# Patient Record
Sex: FEMALE | Race: WHITE | Employment: OTHER | URBAN - METROPOLITAN AREA
[De-identification: names, ages, dates, MRNs, and addresses within clinical notes are randomized per-mention and may not be internally consistent; named-entity substitution may affect disease eponyms.]

---

## 2018-11-21 RX ORDER — BUMETANIDE 1 MG/1
TABLET ORAL
Qty: 60 TABLET | Refills: 0 | OUTPATIENT
Start: 2018-11-21

## 2020-05-21 ENCOUNTER — HOSPITAL ENCOUNTER (OUTPATIENT)
Age: 60
Setting detail: SPECIMEN
Discharge: HOME | End: 2020-05-21
Payer: COMMERCIAL

## 2020-05-21 LAB
ANION GAP SERPL CALCULATED.3IONS-SCNC: 17 MMOL/L (ref 9–17)
BUN BLDV-MCNC: 58 MG/DL (ref 8–23)
BUN/CREAT BLD: ABNORMAL (ref 9–20)
CALCIUM SERPL-MCNC: 9.1 MG/DL (ref 8.6–10.4)
CHLORIDE BLD-SCNC: 100 MMOL/L (ref 98–107)
CO2: 22 MMOL/L (ref 20–31)
CREAT SERPL-MCNC: 2.86 MG/DL (ref 0.5–0.9)
GFR AFRICAN AMERICAN: 20 ML/MIN
GFR NON-AFRICAN AMERICAN: 17 ML/MIN
GFR SERPL CREATININE-BSD FRML MDRD: ABNORMAL ML/MIN/{1.73_M2}
GFR SERPL CREATININE-BSD FRML MDRD: ABNORMAL ML/MIN/{1.73_M2}
GLUCOSE BLD-MCNC: 205 MG/DL (ref 70–99)
HCT VFR BLD CALC: 28.7 % (ref 36.3–47.1)
HEMOGLOBIN: 9.4 G/DL (ref 11.9–15.1)
MCH RBC QN AUTO: 31.2 PG (ref 25.2–33.5)
MCHC RBC AUTO-ENTMCNC: 32.8 G/DL (ref 28.4–34.8)
MCV RBC AUTO: 95.3 FL (ref 82.6–102.9)
NRBC AUTOMATED: 0 PER 100 WBC
PDW BLD-RTO: 15.3 % (ref 11.8–14.4)
PLATELET # BLD: 192 K/UL (ref 138–453)
PMV BLD AUTO: 10.9 FL (ref 8.1–13.5)
POTASSIUM SERPL-SCNC: 4.3 MMOL/L (ref 3.7–5.3)
RBC # BLD: 3.01 M/UL (ref 3.95–5.11)
SODIUM BLD-SCNC: 139 MMOL/L (ref 135–144)
WBC # BLD: 6.8 K/UL (ref 3.5–11.3)

## 2020-05-21 PROCEDURE — 36415 COLL VENOUS BLD VENIPUNCTURE: CPT

## 2020-05-21 PROCEDURE — 80048 BASIC METABOLIC PNL TOTAL CA: CPT

## 2020-05-21 PROCEDURE — 85027 COMPLETE CBC AUTOMATED: CPT

## 2020-05-21 PROCEDURE — P9603 ONE-WAY ALLOW PRORATED MILES: HCPCS

## 2020-05-26 ENCOUNTER — HOSPITAL ENCOUNTER (OUTPATIENT)
Age: 60
Setting detail: SPECIMEN
Discharge: HOME | End: 2020-05-26
Payer: COMMERCIAL

## 2020-05-26 LAB
ANION GAP SERPL CALCULATED.3IONS-SCNC: 15 MMOL/L (ref 9–17)
BUN BLDV-MCNC: 42 MG/DL (ref 8–23)
BUN/CREAT BLD: ABNORMAL (ref 9–20)
CALCIUM SERPL-MCNC: 9 MG/DL (ref 8.6–10.4)
CHLORIDE BLD-SCNC: 107 MMOL/L (ref 98–107)
CO2: 21 MMOL/L (ref 20–31)
CREAT SERPL-MCNC: 2.46 MG/DL (ref 0.5–0.9)
GFR AFRICAN AMERICAN: 24 ML/MIN
GFR NON-AFRICAN AMERICAN: 20 ML/MIN
GFR SERPL CREATININE-BSD FRML MDRD: ABNORMAL ML/MIN/{1.73_M2}
GFR SERPL CREATININE-BSD FRML MDRD: ABNORMAL ML/MIN/{1.73_M2}
GLUCOSE BLD-MCNC: 85 MG/DL (ref 70–99)
POTASSIUM SERPL-SCNC: 4.1 MMOL/L (ref 3.7–5.3)
SODIUM BLD-SCNC: 143 MMOL/L (ref 135–144)

## 2020-05-26 PROCEDURE — P9603 ONE-WAY ALLOW PRORATED MILES: HCPCS

## 2020-05-26 PROCEDURE — 80048 BASIC METABOLIC PNL TOTAL CA: CPT

## 2020-05-26 PROCEDURE — 36415 COLL VENOUS BLD VENIPUNCTURE: CPT

## 2021-03-15 ENCOUNTER — HOSPITAL ENCOUNTER (OUTPATIENT)
Age: 61
Setting detail: SPECIMEN
Discharge: HOME | End: 2021-03-15
Payer: COMMERCIAL

## 2021-03-15 LAB
ANION GAP SERPL CALCULATED.3IONS-SCNC: 12 MMOL/L (ref 9–17)
BUN BLDV-MCNC: 26 MG/DL (ref 8–23)
BUN/CREAT BLD: ABNORMAL (ref 9–20)
CALCIUM SERPL-MCNC: 8.4 MG/DL (ref 8.6–10.4)
CHLORIDE BLD-SCNC: 105 MMOL/L (ref 98–107)
CO2: 21 MMOL/L (ref 20–31)
CREAT SERPL-MCNC: 1.9 MG/DL (ref 0.5–0.9)
ESTIMATED AVERAGE GLUCOSE: 209 MG/DL
GFR AFRICAN AMERICAN: 32 ML/MIN
GFR NON-AFRICAN AMERICAN: 27 ML/MIN
GFR SERPL CREATININE-BSD FRML MDRD: ABNORMAL ML/MIN/{1.73_M2}
GFR SERPL CREATININE-BSD FRML MDRD: ABNORMAL ML/MIN/{1.73_M2}
GLUCOSE BLD-MCNC: 323 MG/DL (ref 70–99)
HBA1C MFR BLD: 8.9 % (ref 4–6)
HCT VFR BLD CALC: 30 % (ref 36.3–47.1)
HEMOGLOBIN: 9.4 G/DL (ref 11.9–15.1)
MCH RBC QN AUTO: 29.3 PG (ref 25.2–33.5)
MCHC RBC AUTO-ENTMCNC: 31.3 G/DL (ref 28.4–34.8)
MCV RBC AUTO: 93.5 FL (ref 82.6–102.9)
NRBC AUTOMATED: 0 PER 100 WBC
PDW BLD-RTO: 15.3 % (ref 11.8–14.4)
PLATELET # BLD: 210 K/UL (ref 138–453)
PMV BLD AUTO: 11 FL (ref 8.1–13.5)
POTASSIUM SERPL-SCNC: 4 MMOL/L (ref 3.7–5.3)
RBC # BLD: 3.21 M/UL (ref 3.95–5.11)
SODIUM BLD-SCNC: 138 MMOL/L (ref 135–144)
WBC # BLD: 9.6 K/UL (ref 3.5–11.3)

## 2021-03-15 PROCEDURE — 83036 HEMOGLOBIN GLYCOSYLATED A1C: CPT

## 2021-03-15 PROCEDURE — 80048 BASIC METABOLIC PNL TOTAL CA: CPT

## 2021-03-15 PROCEDURE — 36415 COLL VENOUS BLD VENIPUNCTURE: CPT

## 2021-03-15 PROCEDURE — P9603 ONE-WAY ALLOW PRORATED MILES: HCPCS

## 2021-03-15 PROCEDURE — 85027 COMPLETE CBC AUTOMATED: CPT

## 2021-03-25 ENCOUNTER — APPOINTMENT (OUTPATIENT)
Dept: GENERAL RADIOLOGY | Age: 61
End: 2021-03-25
Payer: COMMERCIAL

## 2021-03-25 ENCOUNTER — HOSPITAL ENCOUNTER (EMERGENCY)
Age: 61
Discharge: HOME | End: 2021-03-25
Attending: EMERGENCY MEDICINE
Payer: COMMERCIAL

## 2021-03-25 VITALS
RESPIRATION RATE: 14 BRPM | TEMPERATURE: 98.9 F | DIASTOLIC BLOOD PRESSURE: 70 MMHG | BODY MASS INDEX: 38.49 KG/M2 | HEART RATE: 67 BPM | WEIGHT: 231 LBS | SYSTOLIC BLOOD PRESSURE: 185 MMHG | OXYGEN SATURATION: 100 % | HEIGHT: 65 IN

## 2021-03-25 DIAGNOSIS — J18.9 COMMUNITY ACQUIRED PNEUMONIA OF LEFT LOWER LOBE OF LUNG: Primary | ICD-10-CM

## 2021-03-25 LAB
ABSOLUTE EOS #: 0.2 K/UL (ref 0–0.4)
ABSOLUTE IMMATURE GRANULOCYTE: ABNORMAL K/UL (ref 0–0.3)
ABSOLUTE LYMPH #: 1.4 K/UL (ref 1–4.8)
ABSOLUTE MONO #: 0.4 K/UL (ref 0.1–1.3)
ALBUMIN SERPL-MCNC: 3.1 G/DL (ref 3.5–5.2)
ALBUMIN/GLOBULIN RATIO: ABNORMAL (ref 1–2.5)
ALP BLD-CCNC: 120 U/L (ref 35–104)
ALT SERPL-CCNC: 7 U/L (ref 5–33)
ANION GAP SERPL CALCULATED.3IONS-SCNC: 11 MMOL/L (ref 9–17)
AST SERPL-CCNC: 10 U/L
BASOPHILS # BLD: 1 % (ref 0–2)
BASOPHILS ABSOLUTE: 0.1 K/UL (ref 0–0.2)
BILIRUB SERPL-MCNC: 0.41 MG/DL (ref 0.3–1.2)
BNP INTERPRETATION: ABNORMAL
BUN BLDV-MCNC: 24 MG/DL (ref 8–23)
BUN/CREAT BLD: ABNORMAL (ref 9–20)
CALCIUM SERPL-MCNC: 8.9 MG/DL (ref 8.6–10.4)
CHLORIDE BLD-SCNC: 109 MMOL/L (ref 98–107)
CO2: 21 MMOL/L (ref 20–31)
CREAT SERPL-MCNC: 1.69 MG/DL (ref 0.5–0.9)
D-DIMER QUANTITATIVE: 0.64 MG/L FEU (ref 0–0.59)
DIFFERENTIAL TYPE: ABNORMAL
EOSINOPHILS RELATIVE PERCENT: 3 % (ref 0–4)
GFR AFRICAN AMERICAN: 37 ML/MIN
GFR NON-AFRICAN AMERICAN: 31 ML/MIN
GFR SERPL CREATININE-BSD FRML MDRD: ABNORMAL ML/MIN/{1.73_M2}
GFR SERPL CREATININE-BSD FRML MDRD: ABNORMAL ML/MIN/{1.73_M2}
GLUCOSE BLD-MCNC: 262 MG/DL (ref 70–99)
HCT VFR BLD CALC: 30 % (ref 36–46)
HEMOGLOBIN: 10.1 G/DL (ref 12–16)
IMMATURE GRANULOCYTES: ABNORMAL %
INR BLD: 1.2
LYMPHOCYTES # BLD: 21 % (ref 24–44)
MAGNESIUM: 1.7 MG/DL (ref 1.6–2.6)
MCH RBC QN AUTO: 29.8 PG (ref 26–34)
MCHC RBC AUTO-ENTMCNC: 33.6 G/DL (ref 31–37)
MCV RBC AUTO: 88.9 FL (ref 80–100)
MONOCYTES # BLD: 6 % (ref 1–7)
NRBC AUTOMATED: ABNORMAL PER 100 WBC
PARTIAL THROMBOPLASTIN TIME: 26.5 SEC (ref 24–36)
PDW BLD-RTO: 15.7 % (ref 11.5–14.9)
PLATELET # BLD: 262 K/UL (ref 150–450)
PLATELET ESTIMATE: ABNORMAL
PMV BLD AUTO: 7.9 FL (ref 6–12)
POTASSIUM SERPL-SCNC: 3.8 MMOL/L (ref 3.7–5.3)
PRO-BNP: 1202 PG/ML
PROTHROMBIN TIME: 15.1 SEC (ref 11.8–14.6)
RBC # BLD: 3.38 M/UL (ref 4–5.2)
RBC # BLD: ABNORMAL 10*6/UL
SEG NEUTROPHILS: 69 % (ref 36–66)
SEGMENTED NEUTROPHILS ABSOLUTE COUNT: 4.7 K/UL (ref 1.3–9.1)
SODIUM BLD-SCNC: 141 MMOL/L (ref 135–144)
TOTAL PROTEIN: 6.8 G/DL (ref 6.4–8.3)
TROPONIN INTERP: ABNORMAL
TROPONIN INTERP: ABNORMAL
TROPONIN T: ABNORMAL NG/ML
TROPONIN T: ABNORMAL NG/ML
TROPONIN, HIGH SENSITIVITY: 52 NG/L (ref 0–14)
TROPONIN, HIGH SENSITIVITY: 54 NG/L (ref 0–14)
WBC # BLD: 6.8 K/UL (ref 3.5–11)
WBC # BLD: ABNORMAL 10*3/UL

## 2021-03-25 PROCEDURE — 36415 COLL VENOUS BLD VENIPUNCTURE: CPT

## 2021-03-25 PROCEDURE — 83880 ASSAY OF NATRIURETIC PEPTIDE: CPT

## 2021-03-25 PROCEDURE — 80053 COMPREHEN METABOLIC PANEL: CPT

## 2021-03-25 PROCEDURE — 6370000000 HC RX 637 (ALT 250 FOR IP): Performed by: STUDENT IN AN ORGANIZED HEALTH CARE EDUCATION/TRAINING PROGRAM

## 2021-03-25 PROCEDURE — 84484 ASSAY OF TROPONIN QUANT: CPT

## 2021-03-25 PROCEDURE — 93005 ELECTROCARDIOGRAM TRACING: CPT | Performed by: STUDENT IN AN ORGANIZED HEALTH CARE EDUCATION/TRAINING PROGRAM

## 2021-03-25 PROCEDURE — 85025 COMPLETE CBC W/AUTO DIFF WBC: CPT

## 2021-03-25 PROCEDURE — 83735 ASSAY OF MAGNESIUM: CPT

## 2021-03-25 PROCEDURE — 85730 THROMBOPLASTIN TIME PARTIAL: CPT

## 2021-03-25 PROCEDURE — 71045 X-RAY EXAM CHEST 1 VIEW: CPT

## 2021-03-25 PROCEDURE — 6360000002 HC RX W HCPCS: Performed by: STUDENT IN AN ORGANIZED HEALTH CARE EDUCATION/TRAINING PROGRAM

## 2021-03-25 PROCEDURE — 85610 PROTHROMBIN TIME: CPT

## 2021-03-25 PROCEDURE — 99285 EMERGENCY DEPT VISIT HI MDM: CPT

## 2021-03-25 PROCEDURE — 85379 FIBRIN DEGRADATION QUANT: CPT

## 2021-03-25 PROCEDURE — 96374 THER/PROPH/DIAG INJ IV PUSH: CPT

## 2021-03-25 RX ORDER — DOXYCYCLINE 100 MG/1
100 CAPSULE ORAL ONCE
Status: COMPLETED | OUTPATIENT
Start: 2021-03-25 | End: 2021-03-25

## 2021-03-25 RX ORDER — ASPIRIN 81 MG/1
324 TABLET, CHEWABLE ORAL ONCE
Status: COMPLETED | OUTPATIENT
Start: 2021-03-25 | End: 2021-03-25

## 2021-03-25 RX ORDER — DOXYCYCLINE HYCLATE 100 MG
100 TABLET ORAL 2 TIMES DAILY
Qty: 20 TABLET | Refills: 0 | Status: SHIPPED | OUTPATIENT
Start: 2021-03-25 | End: 2021-04-04

## 2021-03-25 RX ORDER — NITROGLYCERIN 0.4 MG/1
0.4 TABLET SUBLINGUAL ONCE
Status: COMPLETED | OUTPATIENT
Start: 2021-03-25 | End: 2021-03-25

## 2021-03-25 RX ORDER — ONDANSETRON 2 MG/ML
4 INJECTION INTRAMUSCULAR; INTRAVENOUS ONCE
Status: COMPLETED | OUTPATIENT
Start: 2021-03-25 | End: 2021-03-25

## 2021-03-25 RX ADMIN — APIXABAN 5 MG: 5 TABLET, FILM COATED ORAL at 16:53

## 2021-03-25 RX ADMIN — ONDANSETRON 4 MG: 2 INJECTION INTRAMUSCULAR; INTRAVENOUS at 15:27

## 2021-03-25 RX ADMIN — ASPIRIN 324 MG: 81 TABLET, CHEWABLE ORAL at 15:27

## 2021-03-25 RX ADMIN — DOXYCYCLINE 100 MG: 100 CAPSULE ORAL at 16:56

## 2021-03-25 RX ADMIN — NITROGLYCERIN 0.4 MG: 0.4 TABLET SUBLINGUAL at 15:27

## 2021-03-25 ASSESSMENT — PAIN DESCRIPTION - FREQUENCY: FREQUENCY: CONTINUOUS

## 2021-03-25 ASSESSMENT — ENCOUNTER SYMPTOMS
BACK PAIN: 1
SORE THROAT: 0
VOMITING: 0
COUGH: 0
SHORTNESS OF BREATH: 1
ABDOMINAL PAIN: 0
NAUSEA: 1
RHINORRHEA: 0
PHOTOPHOBIA: 0

## 2021-03-25 NOTE — ED PROVIDER NOTES
[address]  Emergency Department Encounter  EmergencyMedicine Resident     Pt Name:Estefany Smith  MRN: [id number]  [place] 1960  Date of evaluation: 3/25/21  PCP:  AFSANEH Hancock CNP    CHIEF COMPLAINT       Chief Complaint   Patient presents with    Chest Pain       HISTORY OF PRESENT ILLNESS  (Location/Symptom, Timing/Onset, Context/Setting, Quality, Duration, Modifying Factors, Severity.)      Joey Wilkes is a 59 y.o. female who presents with chest pain and that it radiates through to the thoracic back. Between her is globular. Patient is a similar to prior episodes of MI patient had an episode like this this began last evening and went away but returned this morning worsened in intensity associated with nausea no vomiting associated also with shortness of breath. Patient is not on home oxygen currently on 2 to 3 L nasal cannula. She has been off of her Eliquis since March 18 due to insurance issues she has a history of DVT in each extremity. Last cath 10/21/19    · 1. Multivessel coronary artery disease with 3/3 bypass grafts patent. · 2. Severe disease in the obtuse marginal branch distal to the   anastomosis of the saphenous vein bypass graft is not amenable to   percutaneous revascularization. · 3. Left ventriculography not performed due to elevated creatinine. PAST MEDICAL / SURGICAL / SOCIAL / FAMILY HISTORY      has a past medical history of Backache, unspecified, CHF (congestive heart failure) ([address] Coronary atherosclerosis of artery bypass graft, Cramp of limb, Gallstones, Hypertension, Insomnia, Pneumonia, Type II or unspecified type diabetes mellitus with renal manifestations, not stated as uncontrolled ([address] Type II or unspecified type diabetes mellitus without mention of complication, not stated as uncontrolled ([address] and Unspecified vitamin D deficiency.        has a past surgical history that includes Coronary artery bypass graft; Knee arthroscopy; Carpal tunnel release; Breast surgery; Tonsillectomy; Hand surgery; and Ankle fracture surgery. Social History     Socioeconomic History    Marital status: Single     Spouse name: Not on file    Number of children: Not on file    Years of education: Not on file    Highest education level: Not on file   Occupational History    Not on file   Social Needs    Financial resource strain: Not on file    Food insecurity     Worry: Not on file     Inability: Not on file    Transportation needs     Medical: Not on file     Non-medical: Not on file   Tobacco Use    Smoking status: Never Smoker    Smokeless tobacco: Never Used   Substance and Sexual Activity    Alcohol use: No    Drug use: No    Sexual activity: Not on file   Lifestyle    Physical activity     Days per week: Not on file     Minutes per session: Not on file    Stress: Not on file   Relationships    Social connections     Talks on phone: Not on file     Gets together: Not on file     Attends Worship service: Not on file     Active member of club or organization: Not on file     Attends meetings of clubs or organizations: Not on file     Relationship status: Not on file    Intimate partner violence     Fear of current or ex partner: Not on file     Emotionally abused: Not on file     Physically abused: Not on file     Forced sexual activity: Not on file   Other Topics Concern    Not on file   Social History Narrative    Not on file       Family History   Problem Relation Age of Onset    Diabetes Father     Heart Failure Father        Allergies:  Ace inhibitors, Codeine, Metformin and related, and Penicillins    Home Medications:  Prior to Admission medications    Medication Sig Start Date End Date Taking?  Authorizing Provider   warfarin (COUMADIN) 5 MG tablet TAKE 1 TABLET BY MOUTH ONCE A DAY 9/22/16   Pramod Lane MD   amLODIPine (NORVASC) 5 MG tablet TAKE 1 TABLET BY MOUTH EVERY DAY 7/11/16   Ngozi Goyal MD   omeprazole (PRILOSEC) 40 MG capsule TAKE 1 CAPSULE DAILY AS NEEDED 7/8/16   Enoc Ford MD   isosorbide mononitrate (IMDUR) 30 MG CR tablet TAKE 1 TABLET BY MOUTH EVERY DAY 6/1/16   Enoc Ford MD   LEVEMIR FLEXTOUCH 100 UNIT/ML injection pen INJECT 60 UNITS TWICE DAILY 5/20/16   Enoc Ford MD   meclizine (ANTIVERT) 25 MG tablet TAKE 1 TABLET BY MOUTH THREE TIMES A DAY FOR 30 DAYS 4/14/16   Enoc Ford MD   oxyCODONE-acetaminophen (PERCOCET) 5-325 MG per tablet Take 1 tablet by mouth every 6 hours as needed for Pain 1/8/16   Frances Muller MD   Suvorexant (BELSOMRA) 10 MG TABS Take 10 mg by mouth daily 12/22/15   Enoc Ford MD   losartan (COZAAR) 25 MG tablet TAKE 1 TABLET BY MOUTH EVERY DAY 12/18/15   Star Frias MD   VICTOZA 18 MG/3ML SOPN SC injection INJECT 1.8MG INTO THE SKIN EVERY DAY 11/4/15   James Mayfield, APRN - CNP   furosemide (LASIX) 40 MG tablet TAKE 1 TABLET EVERY DAY 11/3/15   Star Frias MD   metFORMIN (GLUCOPHAGE) 500 MG tablet TAKE 1 TABLET 2 TIMES A DAY 11/3/15   Star Frias MD   furosemide (LASIX) 40 MG tablet TAKE 1 TABLET EVERY DAY 11/3/15   Star Frias MD   carvedilol (COREG) 12.5 MG tablet TAKE 1 TABLET TWICE A DAY 8/31/15   Enoc Ford MD   warfarin (COUMADIN) 1 MG tablet Take one tablet daily with 5mg tablets to make 6mg daily 8/28/15   Enoc Ford MD   atorvastatin (LIPITOR) 40 MG tablet  8/8/15   Historical Provider, MD   losartan (COZAAR) 50 MG tablet Take 50 mg by mouth daily    Historical Provider, MD   potassium chloride SA (K-DUR;KLOR-CON M) 20 MEQ tablet TAKE 1 TABLET EVERY DAY 6/1/15   Enoc Ford MD   EFFIENT 10 MG TABS  4/20/15   Historical Provider, MD   nitroGLYCERIN (NITROSTAT) 0.4 MG SL tablet Place 1 tablet under the tongue every 5 minutes as needed for Chest pain.  3/29/15   James Fees, APRN - CNP   Insulin Pen Needle (BD ULTRA-FINE PEN NEEDLES) 29G X 12.7MM MISC 1 each by Does not apply route 6 times daily. For use with each insulin 12/23/14   Eli Mcknight MD   docusate sodium (COLACE) 100 MG capsule Take 100 mg by mouth 2 times daily. Historical Provider, MD   insulin detemir (LEVEMIR) 100 UNIT/ML injection vial Inject 60 Units into the skin 2 times daily. Historical Provider, MD   insulin glulisine (APIDRA SOLOSTAR) 100 UNIT/ML injection pen Inject 15 Units into the skin 3 times daily (before meals). Inject  into the skin 3 times daily (before meals). 10/30/14   Eli Mcknight MD   glucose blood VI test strips (DEN CONTOUR TEST) strip 1 each by In Vitro route 3 times daily. As needed. 10/30/14   Eli Mcknight MD   Lancets 28G MISC Inject 1 each into the skin 3 times daily. 10/30/14   Eli Mcknight MD   vitamin D (ERGOCALCIFEROL) 400 UNITS CAPS Take 400 Units by mouth daily. Historical Provider, MD   aspirin 81 MG tablet Take 81 mg by mouth daily. Historical Provider, MD       REVIEW OF SYSTEMS    (2-9 systems for level 4, 10 or more for level 5)      Review of Systems   Constitutional: Negative for fever. HENT: Negative for congestion, rhinorrhea and sore throat. Eyes: Negative for photophobia. Respiratory: Positive for shortness of breath. Negative for cough. Cardiovascular: Positive for chest pain. Negative for palpitations and leg swelling. Gastrointestinal: Positive for nausea. Negative for abdominal pain and vomiting. Genitourinary: Negative for flank pain. Musculoskeletal: Positive for back pain. Skin: Negative for pallor. Allergic/Immunologic: Negative for environmental allergies and food allergies. Neurological: Negative for headaches. Psychiatric/Behavioral: Negative for confusion.        PHYSICAL EXAM   (up to 7 for level 4, 8 or more for level 5)      INITIAL VITALS:   BP (!) 189/89   Pulse 76   Temp 98.9 °F (37.2 °C) (Oral)   Resp 15   Ht 5' 5\" (1.651 m)   Wt 231 lb (104.8 kg)   SpO2 96%   BMI 38.44 kg/m²     Physical Exam  Vitals signs and nursing note reviewed. Constitutional:       Appearance: She is ill-appearing. She is not toxic-appearing. HENT:      Head: Normocephalic and atraumatic. Right Ear: External ear normal.      Left Ear: External ear normal.      Nose: Nose normal.      Mouth/Throat:      Mouth: Mucous membranes are moist.   Eyes:      Conjunctiva/sclera: Conjunctivae normal.   Neck:      Musculoskeletal: Normal range of motion. Cardiovascular:      Rate and Rhythm: Normal rate. Pulses: Normal pulses. Pulmonary:      Effort: Pulmonary effort is normal.   Abdominal:      Palpations: Abdomen is soft. Tenderness: There is no abdominal tenderness. There is no right CVA tenderness, left CVA tenderness or guarding. Musculoskeletal: Normal range of motion. General: Tenderness present. No deformity. Right lower leg: No edema. Left lower leg: No edema. Skin:     General: Skin is warm. Capillary Refill: Capillary refill takes less than 2 seconds. Neurological:      Mental Status: She is alert and oriented to person, place, and time.    Psychiatric:         Mood and Affect: Mood normal.         DIFFERENTIAL  DIAGNOSIS     PLAN (LABS / IMAGING / EKG):  Orders Placed This Encounter   Procedures    XR CHEST PORTABLE    CBC with DIFF    Comprehensive Metabolic Panel    Magnesium    Troponin    Brain Natriuretic Peptide    D-Dimer, Quantitative    Protime-INR    APTT    Troponin    EKG 12 Lead       MEDICATIONS ORDERED:  Orders Placed This Encounter   Medications    aspirin chewable tablet 324 mg    ondansetron (ZOFRAN) injection 4 mg    nitroGLYCERIN (NITROSTAT) SL tablet 0.4 mg    apixaban (ELIQUIS) tablet 5 mg    doxycycline monohydrate (MONODOX) capsule 100 mg    doxycycline hyclate (VIBRA-TABS) 100 MG tablet     Sig: Take 1 tablet by mouth 2 times daily for 10 days     Dispense:  20 tablet     Refill:  0       DDX: acs, pneumonia, pt, atypical chest pain, doubt dissection    DIAGNOSTIC RESULTS / EMERGENCY DEPARTMENT COURSE / MDM   LAB RESULTS:  Results for orders placed or performed during the hospital encounter of 03/25/21   CBC with DIFF   Result Value Ref Range    WBC 6.8 3.5 - 11.0 k/uL    RBC 3.38 (L) 4.0 - 5.2 m/uL    Hemoglobin 10.1 (L) 12.0 - 16.0 g/dL    Hematocrit 30.0 (L) 36 - 46 %    MCV 88.9 80 - 100 fL    MCH 29.8 26 - 34 pg    MCHC 33.6 31 - 37 g/dL    RDW 15.7 (H) 11.5 - 14.9 %    Platelets 686 759 - 469 k/uL    MPV 7.9 6.0 - 12.0 fL    NRBC Automated NOT REPORTED per 100 WBC    Differential Type NOT REPORTED     Seg Neutrophils 69 (H) 36 - 66 %    Lymphocytes 21 (L) 24 - 44 %    Monocytes 6 1 - 7 %    Eosinophils % 3 0 - 4 %    Basophils 1 0 - 2 %    Immature Granulocytes NOT REPORTED 0 %    Segs Absolute 4.70 1.3 - 9.1 k/uL    Absolute Lymph # 1.40 1.0 - 4.8 k/uL    Absolute Mono # 0.40 0.1 - 1.3 k/uL    Absolute Eos # 0.20 0.0 - 0.4 k/uL    Basophils Absolute 0.10 0.0 - 0.2 k/uL    Absolute Immature Granulocyte NOT REPORTED 0.00 - 0.30 k/uL    WBC Morphology NOT REPORTED     RBC Morphology NOT REPORTED     Platelet Estimate NOT REPORTED    Comprehensive Metabolic Panel   Result Value Ref Range    Glucose 262 (H) 70 - 99 mg/dL    BUN 24 (H) 8 - 23 mg/dL    CREATININE 1.69 (H) 0.50 - 0.90 mg/dL    Bun/Cre Ratio NOT REPORTED 9 - 20    Calcium 8.9 8.6 - 10.4 mg/dL    Sodium 141 135 - 144 mmol/L    Potassium 3.8 3.7 - 5.3 mmol/L    Chloride 109 (H) 98 - 107 mmol/L    CO2 21 20 - 31 mmol/L    Anion Gap 11 9 - 17 mmol/L    Alkaline Phosphatase 120 (H) 35 - 104 U/L    ALT 7 5 - 33 U/L    AST 10 <32 U/L    Total Bilirubin 0.41 0.3 - 1.2 mg/dL    Total Protein 6.8 6.4 - 8.3 g/dL    Albumin 3.1 (L) 3.5 - 5.2 g/dL    Albumin/Globulin Ratio NOT REPORTED 1.0 - 2.5    GFR Non- 31 (L) >60 mL/min    GFR  37 (L) >60 mL/min    GFR Comment          GFR Staging NOT REPORTED    Magnesium   Result Value Ref Range    Magnesium 1.7 1.6 - 2.6 mg/dL   Troponin   Result Value Ref Range    Troponin, High Sensitivity 54 (HH) 0 - 14 ng/L    Troponin T NOT REPORTED <0.03 ng/mL    Troponin Interp NOT REPORTED    Brain Natriuretic Peptide   Result Value Ref Range    Pro-BNP 1,202 (H) <300 pg/mL    BNP Interpretation Pro-BNP Reference Range:    D-Dimer, Quantitative   Result Value Ref Range    D-Dimer, Quant 0.64 (H) 0.00 - 0.59 mg/L FEU   Protime-INR   Result Value Ref Range    Protime 15.1 (H) 11.8 - 14.6 sec    INR 1.2    APTT   Result Value Ref Range    PTT 26.5 24.0 - 36.0 sec   Troponin   Result Value Ref Range    Troponin, High Sensitivity 52 (HH) 0 - 14 ng/L    Troponin T NOT REPORTED <0.03 ng/mL    Troponin Interp NOT REPORTED    EKG 12 Lead   Result Value Ref Range    Ventricular Rate 71 BPM    Atrial Rate 71 BPM    P-R Interval 154 ms    QRS Duration 88 ms    Q-T Interval 428 ms    QTc Calculation (Bazett) 465 ms    P Axis 32 degrees    R Axis 7 degrees    T Axis 35 degrees       IMPRESSION: Patient is an uncomfortable ill nontoxic 26-year-old female with complaint of chest pain back pain shortness of breath she has been off of her Eliquis there is some concern that this may represent to the PE as she has a history of DVTs been off her Eliquis for approximate 1 week similar presentation to her prior MIs will initiate broad work-up and D-dimer as well as chest pain work-up nitroglycerin for the pain and blood pressure control at this time    RADIOLOGY:  Xr Chest Portable    Result Date: 3/25/2021  EXAMINATION: ONE XRAY VIEW OF THE CHEST 3/25/2021 3:08 pm COMPARISON: None. HISTORY: ORDERING SYSTEM PROVIDED HISTORY: cp eval for thoracic aortic dissection TECHNOLOGIST PROVIDED HISTORY: cp eval for thoracic aortic dissection Reason for Exam: chest pain Acuity: Unknown Type of Exam: Unknown FINDINGS: Cardiac silhouette is enlarged. Ill-defined opacity within the left mid to lower lung. Right hemithorax is clear. Status post midline sternotomy.  Osseous structures and soft tissues are grossly intact. Patchy left lower lobe airspace disease. Cardiomegaly. EKG  Sinus rhythm at a rate of 71 there is normal to leftward axis QTC is 465 there is normal R wave progression appropriate precordial T wave balance no ST-T wave changes nonspecific ECG.     All EKG's are interpreted by the Emergency Department Physician who either signs or Co-signs this chart in the absence of a cardiologist.    EMERGENCY DEPARTMENT COURSE:  ED Course as of Mar 26 0034   u Mar 25, 2021   1502 Right 180/72 left 188/77    [BG]   1504 Seen and evaluated    [BG]   1510 While patient is complaining of back and chest pain she has no focal neuro deficits she is not having differential blood pressures she has never had any issues with a aortic dissections or connective tissue diseases this is similar presentation for prior MI I feel that this is less likely to be an aortic dissection will obtain D-dimer to rule stratify this time especially given the patient's CKD proceeding immediately to CT with contrast with likely push this patient closer to dialysis    [BG]   1519 Alternative diagnosis of pe as pt off eliquis    [BG]   1521 Xr Reviewed no evidence of mediastinal widening    [BG]   1554 Cardioversion with adenosine patient is in sinus tachycardia rate of 113 on EKG normal axis QTC is 384 there is normal R wave progression nonspecific ECG    [BG]   1601 ?new lll pna on xray    [BG]   1604 Creatinine better than previously    [BG]   1624 Lengthy discussion with patient regarding the results of her work-up she is in agreement with foregoing proceeding to a CT scan to further evaluate for any PE at this time given the other possible explanation of her chest pain shortness of breath namely pneumonia we will restart her anticoagulation.    [BG]   1633 Sating 100% on room air    [BG]   1650 Dsipo after repeat trop    [BG]   1656 Will start doxy for cap as pt has had antibiotics within last 3 months    [BG]   1711 Trop downtrending    [BG]      ED Course User Index  [BG] Rita Kincaid DO         PROCEDURES:  none    CONSULTS:  None    CRITICAL CARE:  Please see attending note    FINAL IMPRESSION      1.  Community acquired pneumonia of left lower lobe of lung          DISPOSITION / PLAN     DISPOSITION  dc home with pcp followup      PATIENT REFERRED TO:  Anselom Ojeda, APRN - CNP  [address]  [address] [facility] [address]  [address] [facility]  [phone]    Call today  for followup in 2-3 days    [facility] ED  [place] [address]  [phone]  Go to   As needed      DISCHARGE MEDICATIONS:  Discharge Medication List as of 3/25/2021  5:13 PM          Rita Kincaid DO  Emergency Medicine Resident    (Please note that portions of thisnote were completed with a voice recognition program.  Efforts were made to edit the dictations but occasionally words are mis-transcribed.)       Rita Kincaid DO  Resident  03/26/21 [id number]

## 2021-03-25 NOTE — ED TRIAGE NOTES
From home by EMS with sudden onset midsternal chest pain that radiated into both arms. Patient sttaes she was sitting in her chair when this happened. Patient received 3 doses SL nitroglycerin without relief. Received 3mg IV morphine with relief. . Does have cardiac stents. Discharged from rehab facility 3/18/21 s/p back surgery in December. Lives with family, uses walker at home. VSS. EKG obtained.

## 2021-03-25 NOTE — ED PROVIDER NOTES
[address] [facility] ED     Emergency Department     Faculty Attestation        I performed a history and physical examination of the patient and discussed management with the resident. I reviewed the residents note and agree with the documented findings and plan of care. Any areas of disagreement are noted on the chart. I was personally present for the key portions of any procedures. I have documented in the chart those procedures where I was not present during the key portions. I have reviewed the emergency nurses triage note. I agree with the chief complaint, past medical history, past surgical history, allergies, medications, social and family history as documented unless otherwise noted below. Documentation of the HPI, Physical Exam and Medical Decision Making performed by medical students or scribes is based on my personal performance of the HPI, PE and MDM. For Physician Assistant/ Nurse Practitioner cases/documentation I have have had a face to face evaluation with this patient and have completed at least one if not all key elements of the E/M (history, physical exam, and MDM). Additional findings are as noted.     Pertinent Comments     EKG Interpretation    Interpreted by me    Rhythm: normal sinus   Rate: normal  Axis: normal  Ectopy: none  Conduction: normal  ST Segments: no acute change  T Waves: no acute change  Q Waves: none    Clinical Impression: Nonspecific EKG    (Please note that portions of this note were completed with a voice recognition program.  Efforts were made to edit the dictations but occasionally words are mis-transcribed.)    Roseanna Hashimoto, DO  Attending Emergency Physician        Roseanna Hashimoto, DO  03/25/21 [id number]

## 2021-03-26 LAB
EKG ATRIAL RATE: 71 BPM
EKG P AXIS: 32 DEGREES
EKG P-R INTERVAL: 154 MS
EKG Q-T INTERVAL: 428 MS
EKG QRS DURATION: 88 MS
EKG QTC CALCULATION (BAZETT): 465 MS
EKG R AXIS: 7 DEGREES
EKG T AXIS: 35 DEGREES
EKG VENTRICULAR RATE: 71 BPM

## 2021-03-26 PROCEDURE — 93010 ELECTROCARDIOGRAM REPORT: CPT | Performed by: INTERNAL MEDICINE

## 2021-03-30 ENCOUNTER — HOSPITAL ENCOUNTER (OUTPATIENT)
Dept: GENERAL RADIOLOGY | Facility: CLINIC | Age: 61
Discharge: HOME | End: 2021-04-01
Payer: COMMERCIAL

## 2021-03-30 ENCOUNTER — HOSPITAL ENCOUNTER (OUTPATIENT)
Facility: CLINIC | Age: 61
Discharge: HOME | End: 2021-04-01
Payer: COMMERCIAL

## 2021-03-30 ENCOUNTER — HOSPITAL ENCOUNTER (OUTPATIENT)
Age: 61
Setting detail: SPECIMEN
Discharge: HOME | End: 2021-03-30
Payer: COMMERCIAL

## 2021-03-30 DIAGNOSIS — M25.531 RIGHT WRIST PAIN: ICD-10-CM

## 2021-03-30 DIAGNOSIS — I25.10 CORONARY ARTERY DISEASE INVOLVING NATIVE CORONARY ARTERY OF NATIVE HEART WITHOUT ANGINA PECTORIS: ICD-10-CM

## 2021-03-30 DIAGNOSIS — J18.9 PNEUMONIA OF BOTH LUNGS DUE TO INFECTIOUS ORGANISM, UNSPECIFIED PART OF LUNG: ICD-10-CM

## 2021-03-30 LAB
ANION GAP SERPL CALCULATED.3IONS-SCNC: 13 MMOL/L (ref 9–17)
BUN BLDV-MCNC: 27 MG/DL (ref 8–23)
BUN/CREAT BLD: ABNORMAL (ref 9–20)
CALCIUM SERPL-MCNC: 9.4 MG/DL (ref 8.6–10.4)
CHLORIDE BLD-SCNC: 106 MMOL/L (ref 98–107)
CO2: 21 MMOL/L (ref 20–31)
CREAT SERPL-MCNC: 1.68 MG/DL (ref 0.5–0.9)
GFR AFRICAN AMERICAN: 37 ML/MIN
GFR NON-AFRICAN AMERICAN: 31 ML/MIN
GFR SERPL CREATININE-BSD FRML MDRD: ABNORMAL ML/MIN/{1.73_M2}
GFR SERPL CREATININE-BSD FRML MDRD: ABNORMAL ML/MIN/{1.73_M2}
GLUCOSE BLD-MCNC: 168 MG/DL (ref 70–99)
POTASSIUM SERPL-SCNC: 4.6 MMOL/L (ref 3.7–5.3)
SODIUM BLD-SCNC: 140 MMOL/L (ref 135–144)
URIC ACID: 9.9 MG/DL (ref 2.4–5.7)

## 2021-03-30 PROCEDURE — 71046 X-RAY EXAM CHEST 2 VIEWS: CPT

## 2021-03-30 PROCEDURE — 73110 X-RAY EXAM OF WRIST: CPT

## 2021-03-31 PROBLEM — D50.9 IRON DEFICIENCY ANEMIA: Status: ACTIVE | Noted: 2020-08-31

## 2021-03-31 PROBLEM — N18.4 STAGE 4 CHRONIC KIDNEY DISEASE (HCC): Status: ACTIVE | Noted: 2019-08-06

## 2021-03-31 PROBLEM — I50.32 CHRONIC DIASTOLIC HEART FAILURE (HCC): Status: ACTIVE | Noted: 2018-09-20

## 2021-03-31 PROBLEM — E11.42 DIABETIC POLYNEUROPATHY ASSOCIATED WITH TYPE 2 DIABETES MELLITUS (HCC): Status: ACTIVE | Noted: 2020-02-17

## 2021-03-31 PROBLEM — Z95.1 HISTORY OF CORONARY ARTERY BYPASS GRAFT: Status: ACTIVE | Noted: 2021-03-31

## 2021-03-31 PROBLEM — Z79.4 TYPE 2 DIABETES MELLITUS WITH KIDNEY COMPLICATION, WITH LONG-TERM CURRENT USE OF INSULIN (HCC): Status: ACTIVE | Noted: 2018-09-20

## 2021-03-31 PROBLEM — E11.29 TYPE 2 DIABETES MELLITUS WITH KIDNEY COMPLICATION, WITH LONG-TERM CURRENT USE OF INSULIN (HCC): Status: ACTIVE | Noted: 2018-09-20

## 2021-04-06 ENCOUNTER — APPOINTMENT (OUTPATIENT)
Dept: CT IMAGING | Age: 61
DRG: 074 | End: 2021-04-06
Payer: COMMERCIAL

## 2021-04-06 ENCOUNTER — APPOINTMENT (OUTPATIENT)
Dept: GENERAL RADIOLOGY | Age: 61
DRG: 074 | End: 2021-04-06
Payer: COMMERCIAL

## 2021-04-06 ENCOUNTER — HOSPITAL ENCOUNTER (INPATIENT)
Age: 61
LOS: 2 days | Discharge: HOME | DRG: 074 | End: 2021-04-08
Attending: EMERGENCY MEDICINE | Admitting: FAMILY MEDICINE
Payer: COMMERCIAL

## 2021-04-06 DIAGNOSIS — R55 SYNCOPE AND COLLAPSE: Primary | ICD-10-CM

## 2021-04-06 DIAGNOSIS — E04.1 THYROID NODULE GREATER THAN OR EQUAL TO 1 CM IN DIAMETER INCIDENTALLY NOTED ON IMAGING STUDY: ICD-10-CM

## 2021-04-06 LAB
ALLEN TEST: ABNORMAL
ANION GAP SERPL CALCULATED.3IONS-SCNC: 12 MMOL/L (ref 9–17)
BLOOD BANK SPECIMEN: ABNORMAL
BNP INTERPRETATION: ABNORMAL
BUN BLDV-MCNC: 42 MG/DL (ref 8–23)
CARBOXYHEMOGLOBIN: ABNORMAL %
CHLORIDE BLD-SCNC: 104 MMOL/L (ref 98–107)
CO2: 18 MMOL/L (ref 20–31)
CREAT SERPL-MCNC: 2.05 MG/DL (ref 0.5–0.9)
ETHANOL PERCENT: <0.01 %
ETHANOL: <10 MG/DL
FIO2: ABNORMAL
GFR AFRICAN AMERICAN: 30 ML/MIN
GFR NON-AFRICAN AMERICAN: 24 ML/MIN
GFR SERPL CREATININE-BSD FRML MDRD: ABNORMAL ML/MIN/{1.73_M2}
GFR SERPL CREATININE-BSD FRML MDRD: ABNORMAL ML/MIN/{1.73_M2}
GLUCOSE BLD-MCNC: 380 MG/DL (ref 70–99)
HCG QUALITATIVE: ABNORMAL
HCO3 VENOUS: ABNORMAL MMOL/L (ref 24–30)
HCT VFR BLD CALC: 36.4 % (ref 36–46)
HEMOGLOBIN: 11.6 G/DL (ref 12–16)
INR BLD: 1.2
MCH RBC QN AUTO: 29.4 PG (ref 26–34)
MCHC RBC AUTO-ENTMCNC: 31.9 G/DL (ref 31–37)
MCV RBC AUTO: 92.1 FL (ref 80–100)
METHEMOGLOBIN: ABNORMAL %
MODE: ABNORMAL
NEGATIVE BASE EXCESS, VEN: ABNORMAL MMOL/L (ref 0–2)
NOTIFICATION TIME: ABNORMAL
NOTIFICATION: ABNORMAL
NRBC AUTOMATED: ABNORMAL PER 100 WBC
O2 DEVICE/FLOW/%: ABNORMAL
O2 SAT, VEN: ABNORMAL %
OXYHEMOGLOBIN: ABNORMAL % (ref 95–98)
PARTIAL THROMBOPLASTIN TIME: 28.3 SEC (ref 24–36)
PATIENT TEMP: ABNORMAL
PCO2, VEN, TEMP ADJ: ABNORMAL MMHG (ref 39–55)
PCO2, VEN: ABNORMAL (ref 39–55)
PDW BLD-RTO: 16.4 % (ref 11.5–14.9)
PEEP/CPAP: ABNORMAL
PH VENOUS: ABNORMAL (ref 7.32–7.42)
PH, VEN, TEMP ADJ: ABNORMAL (ref 7.32–7.42)
PLATELET # BLD: 256 K/UL (ref 150–450)
PMV BLD AUTO: 8.3 FL (ref 6–12)
PO2, VEN, TEMP ADJ: ABNORMAL MMHG (ref 30–50)
PO2, VEN: ABNORMAL (ref 30–50)
POSITIVE BASE EXCESS, VEN: ABNORMAL MMOL/L (ref 0–2)
POTASSIUM SERPL-SCNC: 4.4 MMOL/L (ref 3.7–5.3)
PRO-BNP: 526 PG/ML
PROTHROMBIN TIME: 14.9 SEC (ref 11.8–14.6)
PSV: ABNORMAL
PT. POSITION: ABNORMAL
RBC # BLD: 3.95 M/UL (ref 4–5.2)
RESPIRATORY RATE: ABNORMAL
SAMPLE SITE: ABNORMAL
SET RATE: ABNORMAL
SODIUM BLD-SCNC: 134 MMOL/L (ref 135–144)
TEXT FOR RESPIRATORY: ABNORMAL
TOTAL HB: ABNORMAL G/DL (ref 12–16)
TOTAL RATE: ABNORMAL
TROPONIN INTERP: ABNORMAL
TROPONIN INTERP: ABNORMAL
TROPONIN T: ABNORMAL NG/ML
TROPONIN T: ABNORMAL NG/ML
TROPONIN, HIGH SENSITIVITY: 48 NG/L (ref 0–14)
TROPONIN, HIGH SENSITIVITY: 52 NG/L (ref 0–14)
VT: ABNORMAL
WBC # BLD: 10.2 K/UL (ref 3.5–11)

## 2021-04-06 PROCEDURE — 82805 BLOOD GASES W/O2 SATURATION: CPT

## 2021-04-06 PROCEDURE — 82947 ASSAY GLUCOSE BLOOD QUANT: CPT

## 2021-04-06 PROCEDURE — 84703 CHORIONIC GONADOTROPIN ASSAY: CPT

## 2021-04-06 PROCEDURE — 80051 ELECTROLYTE PANEL: CPT

## 2021-04-06 PROCEDURE — 72128 CT CHEST SPINE W/O DYE: CPT

## 2021-04-06 PROCEDURE — 73502 X-RAY EXAM HIP UNI 2-3 VIEWS: CPT

## 2021-04-06 PROCEDURE — 84520 ASSAY OF UREA NITROGEN: CPT

## 2021-04-06 PROCEDURE — 2580000003 HC RX 258: Performed by: STUDENT IN AN ORGANIZED HEALTH CARE EDUCATION/TRAINING PROGRAM

## 2021-04-06 PROCEDURE — 72125 CT NECK SPINE W/O DYE: CPT

## 2021-04-06 PROCEDURE — 73030 X-RAY EXAM OF SHOULDER: CPT

## 2021-04-06 PROCEDURE — 1200000000 HC SEMI PRIVATE

## 2021-04-06 PROCEDURE — 73130 X-RAY EXAM OF HAND: CPT

## 2021-04-06 PROCEDURE — 72131 CT LUMBAR SPINE W/O DYE: CPT

## 2021-04-06 PROCEDURE — 85610 PROTHROMBIN TIME: CPT

## 2021-04-06 PROCEDURE — 85730 THROMBOPLASTIN TIME PARTIAL: CPT

## 2021-04-06 PROCEDURE — 83880 ASSAY OF NATRIURETIC PEPTIDE: CPT

## 2021-04-06 PROCEDURE — 36415 COLL VENOUS BLD VENIPUNCTURE: CPT

## 2021-04-06 PROCEDURE — 99285 EMERGENCY DEPT VISIT HI MDM: CPT

## 2021-04-06 PROCEDURE — 85027 COMPLETE CBC AUTOMATED: CPT

## 2021-04-06 PROCEDURE — 73552 X-RAY EXAM OF FEMUR 2/>: CPT

## 2021-04-06 PROCEDURE — G0480 DRUG TEST DEF 1-7 CLASSES: HCPCS

## 2021-04-06 PROCEDURE — 70450 CT HEAD/BRAIN W/O DYE: CPT

## 2021-04-06 PROCEDURE — 84484 ASSAY OF TROPONIN QUANT: CPT

## 2021-04-06 PROCEDURE — 6360000002 HC RX W HCPCS: Performed by: STUDENT IN AN ORGANIZED HEALTH CARE EDUCATION/TRAINING PROGRAM

## 2021-04-06 PROCEDURE — 96374 THER/PROPH/DIAG INJ IV PUSH: CPT

## 2021-04-06 PROCEDURE — 82565 ASSAY OF CREATININE: CPT

## 2021-04-06 PROCEDURE — 73090 X-RAY EXAM OF FOREARM: CPT

## 2021-04-06 PROCEDURE — 93005 ELECTROCARDIOGRAM TRACING: CPT | Performed by: STUDENT IN AN ORGANIZED HEALTH CARE EDUCATION/TRAINING PROGRAM

## 2021-04-06 PROCEDURE — 74176 CT ABD & PELVIS W/O CONTRAST: CPT

## 2021-04-06 RX ORDER — MORPHINE SULFATE 4 MG/ML
4 INJECTION, SOLUTION INTRAMUSCULAR; INTRAVENOUS ONCE
Status: COMPLETED | OUTPATIENT
Start: 2021-04-06 | End: 2021-04-06

## 2021-04-06 RX ORDER — DOCUSATE SODIUM 100 MG/1
100 CAPSULE, LIQUID FILLED ORAL ONCE
Status: COMPLETED | OUTPATIENT
Start: 2021-04-06 | End: 2021-04-07

## 2021-04-06 RX ORDER — ATORVASTATIN CALCIUM 40 MG/1
40 TABLET, FILM COATED ORAL ONCE
Status: COMPLETED | OUTPATIENT
Start: 2021-04-06 | End: 2021-04-07

## 2021-04-06 RX ORDER — TRAZODONE HYDROCHLORIDE 50 MG/1
50 TABLET ORAL ONCE
Status: COMPLETED | OUTPATIENT
Start: 2021-04-06 | End: 2021-04-07

## 2021-04-06 RX ORDER — RANOLAZINE 500 MG/1
500 TABLET, EXTENDED RELEASE ORAL ONCE
Status: COMPLETED | OUTPATIENT
Start: 2021-04-06 | End: 2021-04-07

## 2021-04-06 RX ORDER — GABAPENTIN 100 MG/1
100 CAPSULE ORAL ONCE
Status: COMPLETED | OUTPATIENT
Start: 2021-04-06 | End: 2021-04-07

## 2021-04-06 RX ORDER — 0.9 % SODIUM CHLORIDE 0.9 %
1000 INTRAVENOUS SOLUTION INTRAVENOUS ONCE
Status: COMPLETED | OUTPATIENT
Start: 2021-04-06 | End: 2021-04-06

## 2021-04-06 RX ORDER — BUSPIRONE HYDROCHLORIDE 5 MG/1
5 TABLET ORAL ONCE
Status: COMPLETED | OUTPATIENT
Start: 2021-04-06 | End: 2021-04-07

## 2021-04-06 RX ADMIN — SODIUM CHLORIDE 1000 ML: 9 INJECTION, SOLUTION INTRAVENOUS at 19:34

## 2021-04-06 RX ADMIN — MORPHINE SULFATE 4 MG: 4 INJECTION, SOLUTION INTRAMUSCULAR; INTRAVENOUS at 19:34

## 2021-04-06 ASSESSMENT — ENCOUNTER SYMPTOMS
FACIAL SWELLING: 0
ABDOMINAL PAIN: 1
BACK PAIN: 1
NAUSEA: 1
TROUBLE SWALLOWING: 0
SHORTNESS OF BREATH: 0
VOMITING: 0
PHOTOPHOBIA: 0

## 2021-04-06 ASSESSMENT — PAIN SCALES - GENERAL
PAINLEVEL_OUTOF10: 10
PAINLEVEL_OUTOF10: 8

## 2021-04-06 ASSESSMENT — PAIN DESCRIPTION - FREQUENCY: FREQUENCY: CONTINUOUS

## 2021-04-06 NOTE — ED PROVIDER NOTES
file   Occupational History    Not on file   Social Needs    Financial resource strain: Not hard at all   Elder-Zena insecurity     Worry: Not on file     Inability: Not on file    Transportation needs     Medical: Not on file     Non-medical: Not on file   Tobacco Use    Smoking status: Never Smoker    Smokeless tobacco: Never Used   Substance and Sexual Activity    Alcohol use: No    Drug use: No    Sexual activity: Not on file   Lifestyle    Physical activity     Days per week: Not on file     Minutes per session: Not on file    Stress: Not on file   Relationships    Social connections     Talks on phone: Not on file     Gets together: Not on file     Attends Judaism service: Not on file     Active member of club or organization: Not on file     Attends meetings of clubs or organizations: Not on file     Relationship status: Not on file    Intimate partner violence     Fear of current or ex partner: Not on file     Emotionally abused: Not on file     Physically abused: Not on file     Forced sexual activity: Not on file   Other Topics Concern    Not on file   Social History Narrative    Not on file       Family History   Problem Relation Age of Onset    Diabetes Father     Heart Failure Father        Allergies:  Adhesive tape, Ace inhibitors, and Metformin and related    Home Medications:  Prior to Admission medications    Medication Sig Start Date End Date Taking? Authorizing Provider   allopurinol (ZYLOPRIM) 100 MG tablet Take 1 tablet by mouth daily 3/31/21   AFSANEH Krishna CNP   febuxostat (ULORIC) 40 MG TABS tablet Take 1 tablet by mouth daily 3/31/21   AFSANEH Krishna CNP   gabapentin (NEURONTIN) 100 MG capsule Take 2 capsules by mouth 2 times daily for 30 days.  3/31/21 4/30/21  AFSANEH Krishna CNP   traZODone (DESYREL) 50 MG tablet Take 75 mg by mouth nightly    Historical Provider, MD   apixaban (ELIQUIS) 5 MG TABS tablet Take 1 tablet by mouth 2 times daily 3/30/21 4/29/21 Eyes: Negative for photophobia. Respiratory: Negative for shortness of breath. Cardiovascular: Positive for chest pain. Gastrointestinal: Positive for abdominal pain and nausea. Negative for vomiting. Genitourinary: Negative for difficulty urinating. Musculoskeletal: Positive for back pain, gait problem and neck pain. Skin: Negative for wound. Allergic/Immunologic: Positive for environmental allergies. Neurological: Positive for syncope and numbness. Hematological: Bruises/bleeds easily. Psychiatric/Behavioral: Negative for agitation and confusion. PHYSICAL EXAM   (up to 7 for level 4, 8 or more for level 5)      INITIAL VITALS:   BP (!) 163/66   Pulse 72   Temp 98.3 °F (36.8 °C) (Oral)   Resp 20   Ht 5' 5\" (1.651 m)   Wt 225 lb (102.1 kg)   SpO2 100%   BMI 37.44 kg/m²     Physical Exam  Vitals signs and nursing note reviewed. Constitutional:       Appearance: She is obese. She is not toxic-appearing. Comments: Acutely injured   HENT:      Head: Normocephalic. Right Ear: External ear normal.      Left Ear: External ear normal.      Nose: Nose normal.   Eyes:      Conjunctiva/sclera: Conjunctivae normal.   Neck:      Musculoskeletal: Muscular tenderness present. Comments: collared  Cardiovascular:      Rate and Rhythm: Normal rate and regular rhythm. Pulses: Normal pulses. Pulmonary:      Effort: Pulmonary effort is normal.   Abdominal:      Palpations: Abdomen is soft. Tenderness: There is abdominal tenderness. Musculoskeletal:         General: Tenderness present. Skin:     General: Skin is warm. Capillary Refill: Capillary refill takes less than 2 seconds. Findings: No bruising. Neurological:      Mental Status: She is alert and oriented to person, place, and time.    Psychiatric:         Mood and Affect: Mood normal.         DIFFERENTIAL  DIAGNOSIS     PLAN (LABS / IMAGING / EKG):  Orders Placed This Encounter   Procedures    Culture, Urine    CT HEAD WO CONTRAST    CT CERVICAL SPINE WO CONTRAST    XR FEMUR LEFT (MIN 2 VIEWS)    XR SHOULDER LEFT (MIN 2 VIEWS)    XR HAND RIGHT (MIN 3 VIEWS)    XR RADIUS ULNA RIGHT (2 VIEWS)    XR HAND LEFT (MIN 3 VIEWS)    XR SHOULDER RIGHT (MIN 2 VIEWS)    XR HIP LEFT (2-3 VIEWS)    CT CHEST ABDOMEN PELVIS WO CONTRAST    CT LUMBAR SPINE WO CONTRAST    CT THORACIC SPINE WO CONTRAST    Trauma Panel    Troponin    Brain Natriuretic Peptide    Urinalysis with Microscopic    Troponin    Telemetry Monitoring    Inpatient consult to Primary Care Provider    Inpatient consult to General Surgery    Inpatient consult to Cardiology    POCT Glucose    EKG 12 Lead    PATIENT STATUS (FROM ED OR OR/PROCEDURAL) Inpatient       MEDICATIONS ORDERED:  Orders Placed This Encounter   Medications    morphine sulfate (PF) injection 4 mg    0.9 % sodium chloride bolus       DDX: syncope, arrythmia, dehydration, ich, fx dislocation,     DIAGNOSTIC RESULTS / EMERGENCY DEPARTMENT COURSE / MDM   LAB RESULTS:  Results for orders placed or performed during the hospital encounter of 04/06/21   Trauma Panel   Result Value Ref Range    Ethanol <10 <10 mg/dL    Ethanol percent <0.010 %    Blood Bank Specimen WRONG TEST ORDERED     BUN 42 (H) 8 - 23 mg/dL    WBC 10.2 3.5 - 11.0 k/uL    RBC 3.95 (L) 4.0 - 5.2 m/uL    Hemoglobin 11.6 (L) 12.0 - 16.0 g/dL    Hematocrit 36.4 36 - 46 %    MCV 92.1 80 - 100 fL    MCH 29.4 26 - 34 pg    MCHC 31.9 31 - 37 g/dL    RDW 16.4 (H) 11.5 - 14.9 %    Platelets 612 503 - 009 k/uL    MPV 8.3 6.0 - 12.0 fL    NRBC Automated NOT REPORTED per 100 WBC    CREATININE 2.05 (H) 0.50 - 0.90 mg/dL    GFR Non-African American 24 (L) >60 mL/min    GFR African American 30 (L) >60 mL/min    GFR Comment          GFR Staging NOT REPORTED     Glucose 380 (H) 70 - 99 mg/dL    hCG Qual PENDING NEGATIVE    Sodium 134 (L) 135 - 144 mmol/L    Potassium 4.4 3.7 - 5.3 mmol/L    Chloride 104 98 - 107 mmol/L    CO2 18 (L) 20 - 31 mmol/L    Anion Gap 12 9 - 17 mmol/L    Protime 14.9 (H) 11.8 - 14.6 sec    INR 1.2     PTT 28.3 24.0 - 36.0 sec    pH, Felix PENDING 7.320 - 7.420    pCO2, Felix PENDING 39.0 - 55.0    pO2, Felix PENDING 30 - 50    HCO3, Venous PENDING 24.0 - 30.0 mmol/L    Positive Base Excess, Felix PENDING 0.0 - 2.0 mmol/L    Negative Base Excess, Felix PENDING 0.0 - 2.0 mmol/L    O2 Sat, Felix PENDING %    Total Hb PENDING 12.0 - 16.0 g/dl    Oxyhemoglobin PENDING 95.0 - 98.0 %    Carboxyhemoglobin PENDING %    Methemoglobin PENDING %    Pt Temp PENDING     pH, Felix, Temp Adj PENDING 7.320 - 7.420    pCO2, Felix, Temp Adj PENDING 39.0 - 55.0 mmHg    pO2, Felix, Temp Adj PENDING 30.0 - 50.0 mmHg    O2 Device/Flow/% PENDING     Respiratory Rate PENDING     Joao Test PENDING     Sample Site PENDING     Pt.  Position PENDING     Mode PENDING     Set Rate PENDING     Total Rate PENDING     VT PENDING     FIO2 PENDING     Peep/Cpap PENDING     PSV PENDING     Text for Respiratory PENDING     NOTIFICATION PENDING     NOTIFICATION TIME PENDING    Troponin   Result Value Ref Range    Troponin, High Sensitivity 52 (HH) 0 - 14 ng/L    Troponin T NOT REPORTED <0.03 ng/mL    Troponin Interp NOT REPORTED    Brain Natriuretic Peptide   Result Value Ref Range    Pro- (H) <300 pg/mL    BNP Interpretation Pro-BNP Reference Range:    Troponin   Result Value Ref Range    Troponin, High Sensitivity 48 (H) 0 - 14 ng/L    Troponin T NOT REPORTED <0.03 ng/mL    Troponin Interp NOT REPORTED    EKG 12 Lead   Result Value Ref Range    Ventricular Rate 73 BPM    Atrial Rate 73 BPM    P-R Interval 156 ms    QRS Duration 100 ms    Q-T Interval 448 ms    QTc Calculation (Bazett) 493 ms    P Axis 62 degrees    R Axis -3 degrees    T Axis 68 degrees       IMPRESSION: acutely injured 49-year-old female currently on a backboard collared status post fall she had prodromal chest pain and syncopal episode loss of consciousness she is on Eliquis plan will be broad imaging chest pain work-up and reevaluation anticipate admission    RADIOLOGY:  Xr Shoulder Right (min 2 Views)    Result Date: 4/6/2021  EXAMINATION: THREE XRAY VIEWS OF THE RIGHT SHOULDER 4/6/2021 6:11 pm COMPARISON: None. HISTORY: ORDERING SYSTEM PROVIDED HISTORY: pain fall TECHNOLOGIST PROVIDED HISTORY: pain fall Reason for Exam: Pt complains of right hand pain with a hx of gout and left hip/lower back pain s/p fall from standing height today. Best images per pt condition Acuity: Unknown Type of Exam: Initial Mechanism of Injury: Pt complains of right hand pain with a hx of gout and left hip/lower back pain s/p fall from standing height today. Best images per pt condition FINDINGS: The visualized bones are normal. There is no evidence of fracture or dislocation. The  joint spaces appear well maintained. The soft tissues are unremarkable. Postsurgical changes overlying the mediastinum and lower cervical spine     No acute bony abnormalities are noted     Xr Radius Ulna Right (2 Views)    Result Date: 4/6/2021  EXAMINATION: TWO XRAY VIEWS OF THE RIGHT FOREARM 4/6/2021 6:11 pm COMPARISON: None. HISTORY: ORDERING SYSTEM PROVIDED HISTORY: pain gout fall TECHNOLOGIST PROVIDED HISTORY: pain gout fall Reason for Exam: Pt complains of right hand pain with a hx of gout and left hip/lower back pain s/p fall from standing height today. Best images per pt condition Acuity: Unknown Type of Exam: Initial Mechanism of Injury: Pt complains of right hand pain with a hx of gout and left hip/lower back pain s/p fall from standing height today. Best images per pt condition FINDINGS: The visualized bones are normal. There is no evidence of fracture or dislocation. The  joint spaces appear well maintained. The soft tissues are unremarkable. Vascular calcifications are seen compatible with atherosclerotic disease.      No acute bony abnormalities are noted     Xr Hand Left (min 3 Views)    Result Date: 4/6/2021  EXAMINATION: COMPARISON: None. HISTORY: ORDERING SYSTEM PROVIDED HISTORY: fall pain TECHNOLOGIST PROVIDED HISTORY: fall pain Reason for Exam: Pt complains of right hand pain with a hx of gout and left hip/lower back pain s/p fall from standing height today. Best images per pt condition Acuity: Unknown Type of Exam: Initial Mechanism of Injury: Pt complains of right hand pain with a hx of gout and left hip/lower back pain s/p fall from standing height today. Best images per pt condition FINDINGS: The visualized bones are normal. There is no evidence of fracture or dislocation. The  joint spaces appear well maintained. The soft tissues are unremarkable. Vascular calcifications are seen compatible with atherosclerotic disease. No acute bony abnormalities are noted     Ct Head Wo Contrast    Result Date: 4/6/2021  EXAMINATION: CT OF THE HEAD WITHOUT CONTRAST; CT OF THE CERVICAL SPINE WITHOUT CONTRAST 4/6/2021 8:41 pm TECHNIQUE: CT of the head was performed without the administration of intravenous contrast. Dose modulation, iterative reconstruction, and/or weight based adjustment of the mA/kV was utilized to reduce the radiation dose to as low as reasonably achievable.; CT of the cervical spine was performed without the administration of intravenous contrast. Multiplanar reformatted images are provided for review. Dose modulation, iterative reconstruction, and/or weight based adjustment of the mA/kV was utilized to reduce the radiation dose to as low as reasonably achievable. COMPARISON: None. HISTORY: ORDERING SYSTEM PROVIDED HISTORY: fall loc eliquis TECHNOLOGIST PROVIDED HISTORY: fall loc eliquis Decision Support Exception->Emergency Medical Condition (MA) Reason for Exam: fall. hit head. patient states pain on left side of body Acuity: Acute Type of Exam: Initial FINDINGS: CT HEAD: BRAIN/VENTRICLES: There is no acute intracranial hemorrhage, mass effect or midline shift. No abnormal extra-axial fluid collection.   The gray-white differentiation is maintained without evidence of an acute infarct. There is no evidence of hydrocephalus. ORBITS: The visualized portion of the orbits demonstrate no acute abnormality. SINUSES: The visualized paranasal sinuses and mastoid air cells demonstrate no acute abnormality. Chronic right maxillary sinusitis. SOFT TISSUES/SKULL:  No acute abnormality of the visualized skull or soft tissues. CT CERVICAL SPINE: BONES/ALIGNMENT: There is no evidence of an acute cervical spine fracture. Anterior fusion of C5-C7 with plate and screws in C5 and C7. DEGENERATIVE CHANGES: Mild osteophytosis and facet arthropathy. SOFT TISSUES: There is no prevertebral soft tissue swelling. No acute intracranial abnormality. No acute fracture of the cervical spine. Anterior fusion of C5, C6 and C7 with plate and screws present screws are in C5 and C7. Ct Cervical Spine Wo Contrast    Result Date: 4/6/2021  EXAMINATION: CT OF THE HEAD WITHOUT CONTRAST; CT OF THE CERVICAL SPINE WITHOUT CONTRAST 4/6/2021 8:41 pm TECHNIQUE: CT of the head was performed without the administration of intravenous contrast. Dose modulation, iterative reconstruction, and/or weight based adjustment of the mA/kV was utilized to reduce the radiation dose to as low as reasonably achievable.; CT of the cervical spine was performed without the administration of intravenous contrast. Multiplanar reformatted images are provided for review. Dose modulation, iterative reconstruction, and/or weight based adjustment of the mA/kV was utilized to reduce the radiation dose to as low as reasonably achievable. COMPARISON: None. HISTORY: ORDERING SYSTEM PROVIDED HISTORY: fall [facility] eliqu TECHNOLOGIST PROVIDED HISTORY: fall [facility] eliqu Decision Support Exception->Emergency Medical Condition (MA) Reason for Exam: fall. hit head.  patient states pain on left side of body Acuity: Acute Type of Exam: Initial FINDINGS: CT HEAD: BRAIN/VENTRICLES: There is no acute intracranial hemorrhage, mass effect or midline shift. No abnormal extra-axial fluid collection. The gray-white differentiation is maintained without evidence of an acute infarct. There is no evidence of hydrocephalus. ORBITS: The visualized portion of the orbits demonstrate no acute abnormality. SINUSES: The visualized paranasal sinuses and mastoid air cells demonstrate no acute abnormality. Chronic right maxillary sinusitis. SOFT TISSUES/SKULL:  No acute abnormality of the visualized skull or soft tissues. CT CERVICAL SPINE: BONES/ALIGNMENT: There is no evidence of an acute cervical spine fracture. Anterior fusion of C5-C7 with plate and screws in C5 and C7. DEGENERATIVE CHANGES: Mild osteophytosis and facet arthropathy. SOFT TISSUES: There is no prevertebral soft tissue swelling. No acute intracranial abnormality. No acute fracture of the cervical spine. Anterior fusion of C5, C6 and C7 with plate and screws present screws are in C5 and C7. Ct Thoracic Spine Wo Contrast    Result Date: 4/6/2021  EXAMINATION: CT OF THE LUMBAR SPINE WITHOUT CONTRAST; CT OF THE THORACIC SPINE WITHOUT CONTRAST  4/6/2021 TECHNIQUE: CT of the lumbar spine was performed without the administration of intravenous contrast. Multiplanar reformatted images are provided for review. Dose modulation, iterative reconstruction, and/or weight based adjustment of the mA/kV was utilized to reduce the radiation dose to as low as reasonably achievable.; CT of the thoracic spine was performed without the administration of intravenous contrast. Multiplanar reformatted images are provided for review. Dose modulation, iterative reconstruction, and/or weight based adjustment of the mA/kV was utilized to reduce the radiation dose to as low as reasonably achievable.  COMPARISON: None HISTORY: ORDERING SYSTEM PROVIDED HISTORY: PAIN TECHNOLOGIST PROVIDED HISTORY: fall Decision Support Exception->Emergency Medical Condition (MA) Reason for Exam: fall Acuity: Acute Type of Exam: Initial Relevant Medical/Surgical History: lumbar surgery; ORDERING SYSTEM PROVIDED HISTORY: pain fall TECHNOLOGIST PROVIDED HISTORY: pain fall Reason for Exam: fall Acuity: Acute Type of Exam: Initial FINDINGS: BONES/ALIGNMENT: Status post fusion of L4-5 with pedicle screws and stabilizing rodsThere is normal a is lignment of the thoracic and lumbar spine. The vertebral body heights are maintained. No osseous destructive lesion is seen. Is DEGENERATIVE CHANGES: .  Multilevel thoracic and upper lumbar spondylosis and mild degenerative disc disease. Central canal is intact. SOFT TISSUES/RETROPERITONEUM: No paraspinal mass is seen. Vascular calcifications are seen compatible with atherosclerotic disease. No acute bony abnormalities in the thoracic and lumbar spine is is     Ct Lumbar Spine Wo Contrast    Result Date: 4/6/2021  EXAMINATION: CT OF THE LUMBAR SPINE WITHOUT CONTRAST; CT OF THE THORACIC SPINE WITHOUT CONTRAST  4/6/2021 TECHNIQUE: CT of the lumbar spine was performed without the administration of intravenous contrast. Multiplanar reformatted images are provided for review. Dose modulation, iterative reconstruction, and/or weight based adjustment of the mA/kV was utilized to reduce the radiation dose to as low as reasonably achievable.; CT of the thoracic spine was performed without the administration of intravenous contrast. Multiplanar reformatted images are provided for review. Dose modulation, iterative reconstruction, and/or weight based adjustment of the mA/kV was utilized to reduce the radiation dose to as low as reasonably achievable.  COMPARISON: None HISTORY: ORDERING SYSTEM PROVIDED HISTORY: PAIN TECHNOLOGIST PROVIDED HISTORY: fall Decision Support Exception->Emergency Medical Condition (MA) Reason for Exam: fall Acuity: Acute Type of Exam: Initial Relevant Medical/Surgical History: lumbar surgery; ORDERING SYSTEM PROVIDED HISTORY: pain fall TECHNOLOGIST PROVIDED HISTORY: pain fall Reason for Exam: fall Acuity: Acute Type of Exam: Initial FINDINGS: BONES/ALIGNMENT: Status post fusion of L4-5 with pedicle screws and stabilizing rodsThere is normal a is lignment of the thoracic and lumbar spine. The vertebral body heights are maintained. No osseous destructive lesion is seen. Is DEGENERATIVE CHANGES: .  Multilevel thoracic and upper lumbar spondylosis and mild degenerative disc disease. Central canal is intact. SOFT TISSUES/RETROPERITONEUM: No paraspinal mass is seen. Vascular calcifications are seen compatible with atherosclerotic disease. No acute bony abnormalities in the thoracic and lumbar spine is is     Xr Shoulder Left (min 2 Views)    Result Date: 4/6/2021  EXAMINATION: TWO XRAY VIEWS OF THE LEFT SHOULDER 4/6/2021 6:11 pm COMPARISON: None. HISTORY: ORDERING SYSTEM PROVIDED HISTORY: fall pain TECHNOLOGIST PROVIDED HISTORY: fall pain Reason for Exam: Pt complains of right hand pain with a hx of gout and left hip/lower back pain s/p fall from standing height today. Best images per pt condition Acuity: Unknown Type of Exam: Initial Mechanism of Injury: Pt complains of right hand pain with a hx of gout and left hip/lower back pain s/p fall from standing height today. Best images per pt condition FINDINGS: The visualized bones are normal. There is no evidence of fracture or dislocation. The  joint spaces appear well maintained. The soft tissues are unremarkable. Postsurgical changes overlying the mediastinum and cervical spine     No acute bony abnormalities are noted     Ct Chest Abdomen Pelvis Wo Contrast    Result Date: 4/6/2021  EXAMINATION: CT OF THE CHEST, ABDOMEN, AND PELVIS WITHOUT CONTRAST 4/6/2021 8:41 pm TECHNIQUE: CT of the chest, abdomen and pelvis was performed without the administration of intravenous contrast. Multiplanar reformatted images are provided for review.  Dose modulation, iterative reconstruction, and/or weight based adjustment of the mA/kV was utilized to reduce the radiation dose to as low as reasonably achievable. COMPARISON: None HISTORY: ORDERING SYSTEM PROVIDED HISTORY: fall on eliquis pain everywhere TECHNOLOGIST PROVIDED HISTORY: fall on eliquis pain everywhere Reason for Exam: fall. patient states pain on left side of body Acuity: Acute Type of Exam: Initial Relevant Medical/Surgical History: CHF, CKD FINDINGS: Chest: Mediastinum: The cardiac size is normal. Coronary artery calcifications status post CABG. .  There is no significant mediastinal, hilar or axillary lymphadenopathy. 1.5 cm hypodense right thyroid nodule. The esophagus shows no significant abnormalities. Lungs/pleura: Bandlike scarring/subsegmental atelectasis posterior left lung base. No focal consolidation. No significant nodules or masses. Soft Tissues/Bones: Median sternotomy. Abdomen/Pelvis: Organs: Limited evaluation the absence of IV contrast.  Cholecystectomy. The liver, spleen, pancreas, adrenal glands show no significant abnormalities. GI/Bowel: There is limited evaluation due to absence of oral contrast. Stomach grossly normal.  Small bowel shows no obstruction or focal lesions. Evaluation of the colon shows no acute process. Normal appendix. Pelvis: Pelvic organs unremarkable. Peritoneum/Retroperitoneum: No ascites. No lymphadenopathy. Atherosclerotic calcifications. Bones/Soft Tissues: No acute abnormality of the bones. The superficial soft tissues show no significant abnormalities. 1. No acute infective or inflammatory process. 2. No acute osseous abnormality. 3. Severe diffuse atherosclerotic disease. 4. A 1.5 cm hypodense right thyroid nodule. Please see followup recommendations below. RECOMMENDATIONS: 1.5 cm incidental right thyroid nodule. Recommend thyroid US. Reference: J Am Toy Radiol.  2015 Feb;12(2): 143-50       EKG  Sinus rhythm at a rate of 71 there is normal to leftward axis normal intervals QTC is 469 there is normal R wave progression nonspecific ECG without any acute ST-T wave changes    All EKG's are interpreted by the Emergency Department Physician who either signs or Co-signs this chart in the absence of a cardiologist.    EMERGENCY DEPARTMENT COURSE:  ED Course as of Apr 07 1527   Tue Apr 06, 2021 2038 Trop at baseline will trend    [BG]   2121 Spoke with zaire patient's sister updated on results and plan for admission.     [BG]   2214 Trop downtrending    [BG]   2220 Kerry np, kulwanta cards    [BG]   2258 Case discussed with dr. Brennen Buchanan will see as consult    [BG]   [id number] Admitted to dr. Randall Corral    [BG]   [phone] Night time home meds and bridging orders placed    [BG]      ED Course User Index  [BG] Flo Dominguez DO         PROCEDURES:  none    CONSULTS:  IP CONSULT TO PRIMARY CARE PROVIDER  IP CONSULT TO GENERAL SURGERY  IP CONSULT TO CARDIOLOGY    CRITICAL CARE:  Please see attending note    FINAL IMPRESSION      1. Syncope and collapse    2. Thyroid nodule greater than or equal to 1 cm in diameter incidentally noted on imaging study          DISPOSITION / PLAN     DISPOSITION  admitted      PATIENT REFERRED TO:  Gracy Whyte, APRN - CNP  [address] [address]  [address] [address]  [phone]    Call today  for followup in 1-2 days.  will need outpatient management followup for 1.5 cm hypodense right thyroid nodule      DISCHARGE MEDICATIONS:  New Prescriptions    No medications on file       Flo Dominguez DO  Emergency Medicine Resident    (Please note that portions of thisnote were completed with a voice recognition program.  Efforts were made to edit the dictations but occasionally words are mis-transcribed.)        Flo Dominguez DO  Resident  04/06/21 [address], DO  Resident  04/07/21 1526

## 2021-04-06 NOTE — ED PROVIDER NOTES
EMERGENCY DEPARTMENT ENCOUNTER   ATTENDING ATTESTATION     Pt Name: Hemant Eli  MRN: [id number]  [place] 1960  Date of evaluation: 4/6/21       Hemant Eli is a 59 y.o. female who presents with Fall  She had an episode of chest pain and syncope. She fell onto her left side, has left hip pain and really, diffuse pain all over. She is not complaining of chest pain currently. On Eliquis    MDM:   CT head, c, t, l spine, chest/abdomen  Extremity x-rays   Chem, CBC, trop, bnp, ekg     Patient will require admission for syncope workup, will defer to trauma pending imaging on trauma vs med admit. Vitals:   Vitals:    04/06/21 1852   BP: (!) 163/66   Pulse: 72   Resp: 20   Temp: 98.3 °F (36.8 °C)   TempSrc: Oral   SpO2: 100%   Weight: 225 lb (102.1 kg)   Height: 5' 5\" (1.651 m)         I personally evaluated and examined the patient in conjunction with the resident and agree with the assessment, treatment plan, and disposition of the patient as recorded by the resident. I performed a history and physical examination of the patient and discussed management with the resident. I reviewed the residents note and agree with the documented findings and plan of care. Any areas of disagreement are noted on the chart. I was personally present for the key portions of any procedures. I have documented in the chart those procedures where I was not present during the key portions. I have personally reviewed all images and agree with the resident's interpretation. I have reviewed the emergency nurses triage note. I agree with the chief complaint, past medical history, past surgical history, allergies, medications, social and family history as documented unless otherwise noted.     Noy Gomes MD  Attending Emergency Physician            Sonu Driver MD  04/06/21 2013

## 2021-04-06 NOTE — ED NOTES
Report given to Teo Monroy RN   Report method {IN PERSOn   The following was reviewed with receiving RN: Patient to ED post syncopal episode/fall. C/O left hip and back pain. ccollar in place for neck pain. Current vital signs:  BP (!) 163/66   Pulse 72   Temp 98.3 °F (36.8 °C) (Oral)   Resp 20   Ht 5' 5\" (1.651 m)   Wt 225 lb (102.1 kg)   SpO2 100%   BMI 37.44 kg/m²                MEWS Score: 1     Any medication or safety alerts were reviewed. Any pending diagnostics and notifications were also reviewed, as well as any safety concerns or issues, abnormal labs, abnormal imaging, and abnormal assessment findings. Questions were answered.             Marce Thompson RN  04/06/21 [id number]

## 2021-04-06 NOTE — ED TRIAGE NOTES
Patient brought to ED after a syncopal episode while visiting family at [facility]. Bystander states patient stood up and went down onto her backside. Patient c/o left hip and lower back pain. Also in c-collar for neck discomfort.

## 2021-04-07 ENCOUNTER — APPOINTMENT (OUTPATIENT)
Dept: NUCLEAR MEDICINE | Age: 61
DRG: 074 | End: 2021-04-07
Payer: COMMERCIAL

## 2021-04-07 PROBLEM — E66.9 OBESITY, CLASS II, BMI 35-39.9: Status: ACTIVE | Noted: 2021-04-07

## 2021-04-07 PROBLEM — E66.812 OBESITY, CLASS II, BMI 35-39.9: Status: ACTIVE | Noted: 2021-04-07

## 2021-04-07 LAB
-: ABNORMAL
AMORPHOUS: ABNORMAL
ANION GAP SERPL CALCULATED.3IONS-SCNC: 9 MMOL/L (ref 9–17)
BACTERIA: ABNORMAL
BILIRUBIN URINE: NEGATIVE
BUN BLDV-MCNC: 40 MG/DL (ref 8–23)
BUN/CREAT BLD: ABNORMAL (ref 9–20)
CALCIUM SERPL-MCNC: 9 MG/DL (ref 8.6–10.4)
CASTS UA: ABNORMAL /LPF
CASTS UA: ABNORMAL /LPF
CHLORIDE BLD-SCNC: 110 MMOL/L (ref 98–107)
CO2: 22 MMOL/L (ref 20–31)
COLOR: YELLOW
COMMENT UA: ABNORMAL
CREAT SERPL-MCNC: 1.96 MG/DL (ref 0.5–0.9)
CRYSTALS, UA: ABNORMAL /HPF
EPITHELIAL CELLS UA: ABNORMAL /HPF
GFR AFRICAN AMERICAN: 31 ML/MIN
GFR NON-AFRICAN AMERICAN: 26 ML/MIN
GFR SERPL CREATININE-BSD FRML MDRD: ABNORMAL ML/MIN/{1.73_M2}
GFR SERPL CREATININE-BSD FRML MDRD: ABNORMAL ML/MIN/{1.73_M2}
GLUCOSE BLD-MCNC: 182 MG/DL (ref 65–105)
GLUCOSE BLD-MCNC: 188 MG/DL (ref 65–105)
GLUCOSE BLD-MCNC: 206 MG/DL (ref 70–99)
GLUCOSE BLD-MCNC: 238 MG/DL (ref 65–105)
GLUCOSE BLD-MCNC: 276 MG/DL (ref 65–105)
GLUCOSE URINE: ABNORMAL
HCT VFR BLD CALC: 33.4 % (ref 36–46)
HEMOGLOBIN: 10.7 G/DL (ref 12–16)
KETONES, URINE: NEGATIVE
LEUKOCYTE ESTERASE, URINE: NEGATIVE
LV EF: 47 %
LVEF MODALITY: NORMAL
MCH RBC QN AUTO: 29.5 PG (ref 26–34)
MCHC RBC AUTO-ENTMCNC: 32.2 G/DL (ref 31–37)
MCV RBC AUTO: 91.7 FL (ref 80–100)
MUCUS: ABNORMAL
NITRITE, URINE: NEGATIVE
NRBC AUTOMATED: ABNORMAL PER 100 WBC
OTHER OBSERVATIONS UA: ABNORMAL
PDW BLD-RTO: 16.4 % (ref 11.5–14.9)
PH UA: 5.5 (ref 5–8)
PLATELET # BLD: 228 K/UL (ref 150–450)
PMV BLD AUTO: 8 FL (ref 6–12)
POTASSIUM SERPL-SCNC: 3.9 MMOL/L (ref 3.7–5.3)
PROTEIN UA: ABNORMAL
RBC # BLD: 3.64 M/UL (ref 4–5.2)
RBC UA: ABNORMAL /HPF
RENAL EPITHELIAL, UA: ABNORMAL /HPF
SODIUM BLD-SCNC: 141 MMOL/L (ref 135–144)
SPECIFIC GRAVITY UA: 1.01 (ref 1–1.03)
TRICHOMONAS: ABNORMAL
TURBIDITY: CLEAR
URINE HGB: NEGATIVE
UROBILINOGEN, URINE: NORMAL
WBC # BLD: 8 K/UL (ref 3.5–11)
WBC UA: ABNORMAL /HPF
YEAST: ABNORMAL

## 2021-04-07 PROCEDURE — 1200000000 HC SEMI PRIVATE

## 2021-04-07 PROCEDURE — A9500 TC99M SESTAMIBI: HCPCS | Performed by: INTERNAL MEDICINE

## 2021-04-07 PROCEDURE — 99223 1ST HOSP IP/OBS HIGH 75: CPT | Performed by: FAMILY MEDICINE

## 2021-04-07 PROCEDURE — 3430000000 HC RX DIAGNOSTIC RADIOPHARMACEUTICAL: Performed by: FAMILY MEDICINE

## 2021-04-07 PROCEDURE — 93308 TTE F-UP OR LMTD: CPT

## 2021-04-07 PROCEDURE — 82947 ASSAY GLUCOSE BLOOD QUANT: CPT

## 2021-04-07 PROCEDURE — 36415 COLL VENOUS BLD VENIPUNCTURE: CPT

## 2021-04-07 PROCEDURE — 81001 URINALYSIS AUTO W/SCOPE: CPT

## 2021-04-07 PROCEDURE — 6360000002 HC RX W HCPCS: Performed by: INTERNAL MEDICINE

## 2021-04-07 PROCEDURE — 6370000000 HC RX 637 (ALT 250 FOR IP): Performed by: STUDENT IN AN ORGANIZED HEALTH CARE EDUCATION/TRAINING PROGRAM

## 2021-04-07 PROCEDURE — A9500 TC99M SESTAMIBI: HCPCS | Performed by: FAMILY MEDICINE

## 2021-04-07 PROCEDURE — 80048 BASIC METABOLIC PNL TOTAL CA: CPT

## 2021-04-07 PROCEDURE — 85027 COMPLETE CBC AUTOMATED: CPT

## 2021-04-07 PROCEDURE — 6370000000 HC RX 637 (ALT 250 FOR IP): Performed by: FAMILY MEDICINE

## 2021-04-07 PROCEDURE — 2580000003 HC RX 258: Performed by: INTERNAL MEDICINE

## 2021-04-07 PROCEDURE — 87086 URINE CULTURE/COLONY COUNT: CPT

## 2021-04-07 PROCEDURE — 3430000000 HC RX DIAGNOSTIC RADIOPHARMACEUTICAL: Performed by: INTERNAL MEDICINE

## 2021-04-07 PROCEDURE — 2580000003 HC RX 258: Performed by: STUDENT IN AN ORGANIZED HEALTH CARE EDUCATION/TRAINING PROGRAM

## 2021-04-07 PROCEDURE — 93017 CV STRESS TEST TRACING ONLY: CPT

## 2021-04-07 PROCEDURE — 6370000000 HC RX 637 (ALT 250 FOR IP): Performed by: INTERNAL MEDICINE

## 2021-04-07 PROCEDURE — 78452 HT MUSCLE IMAGE SPECT MULT: CPT

## 2021-04-07 RX ORDER — DOCUSATE SODIUM 100 MG/1
100 CAPSULE, LIQUID FILLED ORAL 2 TIMES DAILY
Status: DISCONTINUED | OUTPATIENT
Start: 2021-04-07 | End: 2021-04-08 | Stop reason: HOSPADM

## 2021-04-07 RX ORDER — INSULIN GLARGINE 100 [IU]/ML
85 INJECTION, SOLUTION SUBCUTANEOUS DAILY
Status: DISCONTINUED | OUTPATIENT
Start: 2021-04-08 | End: 2021-04-08 | Stop reason: HOSPADM

## 2021-04-07 RX ORDER — SODIUM CHLORIDE 9 MG/ML
500 INJECTION, SOLUTION INTRAVENOUS CONTINUOUS PRN
Status: ACTIVE | OUTPATIENT
Start: 2021-04-07 | End: 2021-04-07

## 2021-04-07 RX ORDER — NICOTINE POLACRILEX 4 MG
15 LOZENGE BUCCAL PRN
Status: DISCONTINUED | OUTPATIENT
Start: 2021-04-07 | End: 2021-04-08 | Stop reason: HOSPADM

## 2021-04-07 RX ORDER — NITROGLYCERIN 0.4 MG/1
0.4 TABLET SUBLINGUAL EVERY 5 MIN PRN
Status: ACTIVE | OUTPATIENT
Start: 2021-04-07 | End: 2021-04-07

## 2021-04-07 RX ORDER — NITROGLYCERIN 0.4 MG/1
0.4 TABLET SUBLINGUAL EVERY 5 MIN PRN
Status: DISCONTINUED | OUTPATIENT
Start: 2021-04-07 | End: 2021-04-07 | Stop reason: SDUPTHER

## 2021-04-07 RX ORDER — GABAPENTIN 100 MG/1
200 CAPSULE ORAL 2 TIMES DAILY
Status: DISCONTINUED | OUTPATIENT
Start: 2021-04-07 | End: 2021-04-08 | Stop reason: HOSPADM

## 2021-04-07 RX ORDER — FEBUXOSTAT 40 MG/1
40 TABLET, FILM COATED ORAL DAILY
Status: DISCONTINUED | OUTPATIENT
Start: 2021-04-07 | End: 2021-04-08 | Stop reason: HOSPADM

## 2021-04-07 RX ORDER — OXYCODONE HYDROCHLORIDE 5 MG/1
5 TABLET ORAL EVERY 4 HOURS PRN
Status: DISCONTINUED | OUTPATIENT
Start: 2021-04-07 | End: 2021-04-08 | Stop reason: HOSPADM

## 2021-04-07 RX ORDER — PANTOPRAZOLE SODIUM 40 MG/1
40 TABLET, DELAYED RELEASE ORAL
Status: DISCONTINUED | OUTPATIENT
Start: 2021-04-08 | End: 2021-04-08 | Stop reason: HOSPADM

## 2021-04-07 RX ORDER — OXYCODONE HYDROCHLORIDE 10 MG/1
10 TABLET ORAL EVERY 4 HOURS PRN
Status: DISCONTINUED | OUTPATIENT
Start: 2021-04-07 | End: 2021-04-08 | Stop reason: HOSPADM

## 2021-04-07 RX ORDER — METOPROLOL TARTRATE 5 MG/5ML
5 INJECTION INTRAVENOUS EVERY 5 MIN PRN
Status: ACTIVE | OUTPATIENT
Start: 2021-04-07 | End: 2021-04-07

## 2021-04-07 RX ORDER — SODIUM CHLORIDE 0.9 % (FLUSH) 0.9 %
5-40 SYRINGE (ML) INJECTION PRN
Status: ACTIVE | OUTPATIENT
Start: 2021-04-07 | End: 2021-04-07

## 2021-04-07 RX ORDER — DEXTROSE MONOHYDRATE 25 G/50ML
12.5 INJECTION, SOLUTION INTRAVENOUS PRN
Status: DISCONTINUED | OUTPATIENT
Start: 2021-04-07 | End: 2021-04-08 | Stop reason: HOSPADM

## 2021-04-07 RX ORDER — INSULIN GLARGINE 100 [IU]/ML
15 INJECTION, SOLUTION SUBCUTANEOUS 3 TIMES DAILY
Status: DISCONTINUED | OUTPATIENT
Start: 2021-04-07 | End: 2021-04-08 | Stop reason: HOSPADM

## 2021-04-07 RX ORDER — BUSPIRONE HYDROCHLORIDE 5 MG/1
5 TABLET ORAL 2 TIMES DAILY
Status: DISCONTINUED | OUTPATIENT
Start: 2021-04-07 | End: 2021-04-08 | Stop reason: HOSPADM

## 2021-04-07 RX ORDER — ACETAMINOPHEN 325 MG/1
650 TABLET ORAL EVERY 4 HOURS PRN
Status: DISCONTINUED | OUTPATIENT
Start: 2021-04-07 | End: 2021-04-08 | Stop reason: HOSPADM

## 2021-04-07 RX ORDER — ATORVASTATIN CALCIUM 40 MG/1
40 TABLET, FILM COATED ORAL DAILY
Status: DISCONTINUED | OUTPATIENT
Start: 2021-04-07 | End: 2021-04-08 | Stop reason: HOSPADM

## 2021-04-07 RX ORDER — ATROPINE SULFATE 0.1 MG/ML
0.5 INJECTION INTRAVENOUS EVERY 5 MIN PRN
Status: ACTIVE | OUTPATIENT
Start: 2021-04-07 | End: 2021-04-07

## 2021-04-07 RX ORDER — ALLOPURINOL 100 MG/1
100 TABLET ORAL DAILY
Status: DISCONTINUED | OUTPATIENT
Start: 2021-04-07 | End: 2021-04-08 | Stop reason: HOSPADM

## 2021-04-07 RX ORDER — ONDANSETRON 2 MG/ML
4 INJECTION INTRAMUSCULAR; INTRAVENOUS EVERY 8 HOURS PRN
Status: DISCONTINUED | OUTPATIENT
Start: 2021-04-07 | End: 2021-04-08 | Stop reason: HOSPADM

## 2021-04-07 RX ORDER — BUMETANIDE 1 MG/1
2 TABLET ORAL DAILY
Status: DISCONTINUED | OUTPATIENT
Start: 2021-04-07 | End: 2021-04-08

## 2021-04-07 RX ORDER — RANOLAZINE 500 MG/1
500 TABLET, EXTENDED RELEASE ORAL 2 TIMES DAILY
Status: DISCONTINUED | OUTPATIENT
Start: 2021-04-07 | End: 2021-04-08 | Stop reason: HOSPADM

## 2021-04-07 RX ORDER — SODIUM CHLORIDE 9 MG/ML
25 INJECTION, SOLUTION INTRAVENOUS PRN
Status: DISCONTINUED | OUTPATIENT
Start: 2021-04-07 | End: 2021-04-08 | Stop reason: HOSPADM

## 2021-04-07 RX ORDER — NITROGLYCERIN 0.4 MG/1
0.4 TABLET SUBLINGUAL EVERY 5 MIN PRN
Status: DISCONTINUED | OUTPATIENT
Start: 2021-04-07 | End: 2021-04-08 | Stop reason: HOSPADM

## 2021-04-07 RX ORDER — FENTANYL CITRATE 50 UG/ML
50 INJECTION, SOLUTION INTRAMUSCULAR; INTRAVENOUS
Status: DISCONTINUED | OUTPATIENT
Start: 2021-04-07 | End: 2021-04-08 | Stop reason: HOSPADM

## 2021-04-07 RX ORDER — ASPIRIN 81 MG/1
81 TABLET ORAL DAILY
Status: DISCONTINUED | OUTPATIENT
Start: 2021-04-07 | End: 2021-04-08 | Stop reason: HOSPADM

## 2021-04-07 RX ORDER — ISOSORBIDE MONONITRATE 30 MG/1
30 TABLET, EXTENDED RELEASE ORAL DAILY
Status: DISCONTINUED | OUTPATIENT
Start: 2021-04-07 | End: 2021-04-08 | Stop reason: HOSPADM

## 2021-04-07 RX ORDER — AMINOPHYLLINE DIHYDRATE 25 MG/ML
50 INJECTION, SOLUTION INTRAVENOUS PRN
Status: ACTIVE | OUTPATIENT
Start: 2021-04-07 | End: 2021-04-07

## 2021-04-07 RX ORDER — CARVEDILOL 6.25 MG/1
6.25 TABLET ORAL 2 TIMES DAILY
Status: DISCONTINUED | OUTPATIENT
Start: 2021-04-07 | End: 2021-04-08 | Stop reason: HOSPADM

## 2021-04-07 RX ORDER — SODIUM CHLORIDE 0.9 % (FLUSH) 0.9 %
5-40 SYRINGE (ML) INJECTION EVERY 12 HOURS SCHEDULED
Status: DISCONTINUED | OUTPATIENT
Start: 2021-04-07 | End: 2021-04-08 | Stop reason: HOSPADM

## 2021-04-07 RX ORDER — FENTANYL CITRATE 50 UG/ML
25 INJECTION, SOLUTION INTRAMUSCULAR; INTRAVENOUS
Status: DISCONTINUED | OUTPATIENT
Start: 2021-04-07 | End: 2021-04-08 | Stop reason: HOSPADM

## 2021-04-07 RX ORDER — DEXTROSE MONOHYDRATE 50 MG/ML
100 INJECTION, SOLUTION INTRAVENOUS PRN
Status: DISCONTINUED | OUTPATIENT
Start: 2021-04-07 | End: 2021-04-08 | Stop reason: HOSPADM

## 2021-04-07 RX ORDER — SODIUM CHLORIDE 0.9 % (FLUSH) 0.9 %
10 SYRINGE (ML) INJECTION PRN
Status: DISCONTINUED | OUTPATIENT
Start: 2021-04-07 | End: 2021-04-08 | Stop reason: HOSPADM

## 2021-04-07 RX ORDER — TRAZODONE HYDROCHLORIDE 50 MG/1
75 TABLET ORAL NIGHTLY
Status: DISCONTINUED | OUTPATIENT
Start: 2021-04-07 | End: 2021-04-08 | Stop reason: HOSPADM

## 2021-04-07 RX ORDER — SODIUM CHLORIDE 0.9 % (FLUSH) 0.9 %
5-40 SYRINGE (ML) INJECTION PRN
Status: DISCONTINUED | OUTPATIENT
Start: 2021-04-07 | End: 2021-04-08 | Stop reason: HOSPADM

## 2021-04-07 RX ADMIN — TRAZODONE HYDROCHLORIDE 50 MG: 50 TABLET ORAL at 00:46

## 2021-04-07 RX ADMIN — ASPIRIN 81 MG: 81 TABLET, COATED ORAL at 14:42

## 2021-04-07 RX ADMIN — GABAPENTIN 200 MG: 100 CAPSULE ORAL at 21:06

## 2021-04-07 RX ADMIN — ALLOPURINOL 100 MG: 100 TABLET ORAL at 14:41

## 2021-04-07 RX ADMIN — DOCUSATE SODIUM 100 MG: 100 CAPSULE, LIQUID FILLED ORAL at 21:06

## 2021-04-07 RX ADMIN — BUSPIRONE HYDROCHLORIDE 5 MG: 5 TABLET ORAL at 21:06

## 2021-04-07 RX ADMIN — INSULIN GLARGINE 15 UNITS: 100 INJECTION, SOLUTION SUBCUTANEOUS at 21:24

## 2021-04-07 RX ADMIN — REGADENOSON 0.4 MG: 0.08 INJECTION, SOLUTION INTRAVENOUS at 10:32

## 2021-04-07 RX ADMIN — OXYCODONE HYDROCHLORIDE 10 MG: 10 TABLET ORAL at 00:46

## 2021-04-07 RX ADMIN — RANOLAZINE 500 MG: 500 TABLET, FILM COATED, EXTENDED RELEASE ORAL at 21:05

## 2021-04-07 RX ADMIN — APIXABAN 5 MG: 5 TABLET, FILM COATED ORAL at 00:46

## 2021-04-07 RX ADMIN — SODIUM CHLORIDE, PRESERVATIVE FREE 10 ML: 5 INJECTION INTRAVENOUS at 08:14

## 2021-04-07 RX ADMIN — RANOLAZINE 500 MG: 500 TABLET, FILM COATED, EXTENDED RELEASE ORAL at 00:46

## 2021-04-07 RX ADMIN — BUMETANIDE 2 MG: 1 TABLET ORAL at 14:41

## 2021-04-07 RX ADMIN — INSULIN GLARGINE 15 UNITS: 100 INJECTION, SOLUTION SUBCUTANEOUS at 14:41

## 2021-04-07 RX ADMIN — ATORVASTATIN CALCIUM 40 MG: 40 TABLET, FILM COATED ORAL at 00:46

## 2021-04-07 RX ADMIN — SODIUM CHLORIDE, PRESERVATIVE FREE 10 ML: 5 INJECTION INTRAVENOUS at 10:15

## 2021-04-07 RX ADMIN — INSULIN LISPRO 3 UNITS: 100 INJECTION, SOLUTION INTRAVENOUS; SUBCUTANEOUS at 21:24

## 2021-04-07 RX ADMIN — TRAZODONE HYDROCHLORIDE 75 MG: 50 TABLET ORAL at 21:06

## 2021-04-07 RX ADMIN — ISOSORBIDE MONONITRATE 30 MG: 30 TABLET, EXTENDED RELEASE ORAL at 21:24

## 2021-04-07 RX ADMIN — FEBUXOSTAT 40 MG: 40 TABLET, FILM COATED ORAL at 14:45

## 2021-04-07 RX ADMIN — BUSPIRONE HYDROCHLORIDE 5 MG: 5 TABLET ORAL at 00:46

## 2021-04-07 RX ADMIN — Medication 10 ML: at 21:28

## 2021-04-07 RX ADMIN — TETRAKIS(2-METHOXYISOBUTYLISOCYANIDE)COPPER(I) TETRAFLUOROBORATE 36.1 MILLICURIE: 1 INJECTION, POWDER, LYOPHILIZED, FOR SOLUTION INTRAVENOUS at 10:34

## 2021-04-07 RX ADMIN — CARVEDILOL 6.25 MG: 6.25 TABLET, FILM COATED ORAL at 21:25

## 2021-04-07 RX ADMIN — TETRAKIS(2-METHOXYISOBUTYLISOCYANIDE)COPPER(I) TETRAFLUOROBORATE 11.1 MILLICURIE: 1 INJECTION, POWDER, LYOPHILIZED, FOR SOLUTION INTRAVENOUS at 08:14

## 2021-04-07 RX ADMIN — INSULIN LISPRO 4 UNITS: 100 INJECTION, SOLUTION INTRAVENOUS; SUBCUTANEOUS at 18:27

## 2021-04-07 RX ADMIN — OXYCODONE HYDROCHLORIDE 10 MG: 10 TABLET ORAL at 21:21

## 2021-04-07 RX ADMIN — DOCUSATE SODIUM 100 MG: 100 CAPSULE, LIQUID FILLED ORAL at 00:46

## 2021-04-07 RX ADMIN — ATORVASTATIN CALCIUM 40 MG: 40 TABLET, FILM COATED ORAL at 14:41

## 2021-04-07 RX ADMIN — GABAPENTIN 100 MG: 100 CAPSULE ORAL at 00:46

## 2021-04-07 ASSESSMENT — PAIN SCALES - GENERAL: PAINLEVEL_OUTOF10: 6

## 2021-04-07 ASSESSMENT — PAIN DESCRIPTION - LOCATION: LOCATION: HAND

## 2021-04-07 ASSESSMENT — PAIN DESCRIPTION - ORIENTATION
ORIENTATION: RIGHT
ORIENTATION: RIGHT;LEFT

## 2021-04-07 ASSESSMENT — PAIN DESCRIPTION - PAIN TYPE: TYPE: ACUTE PAIN

## 2021-04-07 NOTE — PROGRESS NOTES
Pt arrives to MED C, pt is alert and  O x4  Telemetry applied to pt  Pt. Has no c/o of pain or SOB at this time  No c/o of nausea at this time  Pt. Assisted to walking to the restroom, did c/o sl.  Dizziness  New gown put on pt       Urine sent to lab

## 2021-04-07 NOTE — ED NOTES
\"[facility] phone number [phone]     Jona Bumpers, RN  04/06/21 2010

## 2021-04-07 NOTE — PLAN OF CARE
Problem: Falls - Risk of:  Goal: Will remain free from falls  Description: Will remain free from falls  Outcome: Ongoing  Note: No falls this shift. Call light with in reach, side rails up x2, bed in lowest postion. Patients safety maintained. Goal: Absence of physical injury  Description: Absence of physical injury  Outcome: Ongoing  Note: No injury this shift. Patients safety maintained. Problem: Pain:  Goal: Pain level will decrease  Description: Pain level will decrease  Outcome: Ongoing  Note: No c/o any discomfort this shift. Goal: Control of acute pain  Description: Control of acute pain  Outcome: Ongoing  Note: No c/o any discomfort this shift. Goal: Control of chronic pain  Description: Control of chronic pain  Outcome: Ongoing  Note: No c/o any discomfort this shift.       Problem: Physical Regulation:  Goal: Complications related to the disease process, condition or treatment will be avoided or minimized  Description: Complications related to the disease process, condition or treatment will be avoided or minimized  Outcome: Ongoing

## 2021-04-07 NOTE — CONSULTS
General Surgery Consult      Pt Name: Ag Glover  MRN: [id number]  YOB: 1960  Date of evaluation: 4/7/2021  Primary Care Physician: AFSANEH Horn CNP   Patient evaluated at the request of  Dr. Heath Baez  Reason for evaluation: Fall    SUBJECTIVE:   History of Chief Complaint:    Ag Glover is a 59 y.o. female with PMHx CHF, CAD s/p CABG, type II DM who presents with fall from standing height. Patient states she had some chest pains then collapsed onto her left side. She did lose consciousness briefly and does believe she hit her head. No ecchymosis, hematoma, or laceration noted to scalp. She does take Eliquis and aspirin daily. Today patient mostly c/o pain in left hip, b/l shoulders and hands. No current chest pain. She denies fevers/chills, change in vision, midline neck or back pain, abdominal pain, N/V/D, melena, hematochezia, dysuria. CT head negative. CT cervical/thoracic/lumbar spine negative for acute abnormalities. CT chest/abdomen/pelvis negative for acute abnormalities as well. XR's of b/l shoulder, b/l hands, left hip/femur, and right forearm negative for acute bony abnormalities. S/p open cholecystectomy. Troponin's were reportedly at baseline. Symptom onset has been acute for a time period of 1 day(s). Severity is described as moderate. Course of her symptoms over time is acute. Past Medical History   has a past medical history of Backache, unspecified, CHF (congestive heart failure) ([address] Coronary atherosclerosis of artery bypass graft, Cramp of limb, Gallstones, Hypertension, Insomnia, Pneumonia, Type II or unspecified type diabetes mellitus with renal manifestations, not stated as uncontrolled(250.40), Type II or unspecified type diabetes mellitus without mention of complication, not stated as uncontrolled, and Unspecified vitamin D deficiency. Past Surgical History   has a past surgical history that includes Coronary artery bypass graft; Knee arthroscopy;  Carpal tunnel release; Breast surgery; Tonsillectomy; Hand surgery; and Ankle fracture surgery. Medications  Prior to Admission medications    Medication Sig Start Date End Date Taking? Authorizing Provider   allopurinol (ZYLOPRIM) 100 MG tablet Take 1 tablet by mouth daily 3/31/21  Yes AFSNAEH Gregg CNP   febuxostat (ULORIC) 40 MG TABS tablet Take 1 tablet by mouth daily 3/31/21  Yes AFSANEH Gregg CNP   traZODone (DESYREL) 50 MG tablet Take 75 mg by mouth nightly   Yes Historical Provider, MD   apixaban (ELIQUIS) 5 MG TABS tablet Take 1 tablet by mouth 2 times daily 3/30/21 4/29/21 Yes AFSANEH Gregg CNP   bumetanide (BUMEX) 2 MG tablet Take 1 tablet by mouth daily 3/30/21  Yes AFSANEH Gregg CNP   atorvastatin (LIPITOR) 40 MG tablet Take 1 tablet by mouth daily 3/30/21  Yes AFSANEH Gregg CNP   busPIRone (BUSPAR) 5 MG tablet Take 1 tablet by mouth 2 times daily 3/30/21 4/29/21 Yes AFSANEH Gregg CNP   carvedilol (COREG) 6.25 MG tablet Take 1 tablet by mouth 2 times daily 3/30/21  Yes AFSANEH Gregg CNP   ranolazine (RANEXA) 500 MG extended release tablet Take 1 tablet by mouth 2 times daily 3/30/21  Yes AFSANEH Gregg CNP   Insulin Degludec (TRESIBA FLEXTOUCH) 100 UNIT/ML SOPN Inject 85 Units into the skin daily 3/30/21 4/29/21 Yes AFSANEH Gregg CNP   insulin glargine (LANTUS SOLOSTAR) 100 UNIT/ML injection pen Inject 15 Units into the skin three times daily 3/30/21  Yes AFSANEH Gregg CNP   Liraglutide (VICTOZA) 18 MG/3ML SOPN SC injection Inject 1.8 mg into the skin daily 3/30/21  Yes AFSANEH Gregg CNP   Insulin Pen Needle (BD ULTRA-FINE PEN NEEDLES) 29G X 12.7MM MISC 1 each by Does not apply route 6 times daily. For use with each insulin 12/23/14  Yes Ajnu Coats MD   docusate sodium (COLACE) 100 MG capsule Take 100 mg by mouth 2 times daily.    Yes Historical Provider, MD   glucose blood VI test strips (DEN CONTOUR TEST) strip 1 each by In Vitro route 3 times daily. As needed. 10/30/14  Yes Mary Huang MD   Lancets 28G MISC Inject 1 each into the skin 3 times daily. 10/30/14  Yes Mary Huang MD   aspirin 81 MG tablet Take 81 mg by mouth daily. Yes Historical Provider, MD   gabapentin (NEURONTIN) 100 MG capsule Take 2 capsules by mouth 2 times daily for 30 days. 3/31/21 4/30/21  AFSANEH Leger CNP   nitroGLYCERIN (NITROSTAT) 0.4 MG SL tablet Place 1 tablet under the tongue every 5 minutes as needed for Chest pain. 3/29/15   AFSANEH Atkins CNP     Allergies  is allergic to adhesive tape; ace inhibitors; and metformin and related. Family History  family history includes Diabetes in her father; Heart Failure in her father. Social History   reports that she has never smoked. She has never used smokeless tobacco. She reports that she does not drink alcohol or use drugs. Review of Systems:  General Denies any fever or chills  HEENT Denies any diplopia, tinnitus or vertigo  Resp Denies any shortness of breath, cough or wheezing  Cardiac Denies any current chest pain, palpitations, claudication or edema  GI Denies any melena, hematochezia, hematemesis or pyrosis   Denies any frequency, urgency, hesitancy or incontinence  Heme Patient on Eliquis and aspirin   Endocrine PMHx type II DM  Neuro Denies any focal motor or sensory deficits    OBJECTIVE:   VITALS:  height is 5' 5\" (1.651 m) and weight is 232 lb 9.4 oz (105.5 kg). Her oral temperature is 97.7 °F (36.5 °C). Her blood pressure is 158/72 (abnormal) and her pulse is 77. Her respiration is 20 and oxygen saturation is 95%. CONSTITUTIONAL: Alert and oriented times 3, no acute distress and cooperative to examination with proper mood and affect. SKIN: Skin color, texture, turgor normal. No rashes or lesions. LYMPH: no cervical nodes, no inguinal nodes  HEENT: Head is normocephalic, atraumatic.  EOMI, PERRLA  NECK: Supple, symmetrical, trachea midline, no adenopathy  CHEST/LUNGS: chest symmetric with normal A/P diameter, normal respiratory rate and rhythm, lungs clear to auscultation without wheezes, rales or rhonchi. No accessory muscle use. Scars Median sternotomy   CARDIOVASCULAR: Heart regular rate and rhythm Normal S1 and S2. . Carotid and femoral pulses 2+/4 and equal bilaterally  ABDOMEN: Normal shape. Large RUQ scar(s) present, well healed. Normal bowel sounds. No bruits. Soft, nondistended, no masses or organomegaly. no evidence of hernia. Percussion: Normal without hepatosplenomegally. Tenderness: absent  RECTAL: deferred, not clinically indicated  NEUROLOGIC: There are no focalizing motor or sensory deficits. CN II-XII are grossly intact. EXTREMITIES: no cyanosis, no clubbing, no edema. B/l MTP joints slightly swollen and tender form PMHx gout.     LABS:   CBC with Differential:    Lab Results   Component Value Date    WBC 8.0 04/07/2021    RBC 3.64 04/07/2021    HGB 10.7 04/07/2021    HCT 33.4 04/07/2021     04/07/2021    MCV 91.7 04/07/2021    MCH 29.5 04/07/2021    MCHC 32.2 04/07/2021    RDW 16.4 04/07/2021    LYMPHOPCT 21 03/25/2021    LYMPHOPCT 23.1 04/09/2015    MONOPCT 6 03/25/2021    MONOPCT 4.6 04/09/2015    EOSPCT 6.7 04/09/2015    BASOPCT 1 03/25/2021    BASOPCT 0.7 04/09/2015    MONOSABS 0.40 03/25/2021    MONOSABS 0.4 04/09/2015    LYMPHSABS 1.40 03/25/2021    LYMPHSABS 1.8 04/09/2015    EOSABS 0.20 03/25/2021    EOSABS 0.5 04/09/2015    BASOSABS 0.10 03/25/2021    DIFFTYPE NOT REPORTED 03/25/2021     BMP:    Lab Results   Component Value Date     04/07/2021    K 3.9 04/07/2021     04/07/2021    CO2 22 04/07/2021    BUN 40 04/07/2021    LABALBU 3.1 03/25/2021    CREATININE 1.96 04/07/2021    CALCIUM 9.0 04/07/2021    GFRAA 31 04/07/2021    LABGLOM 26 04/07/2021    GLUCOSE 206 04/07/2021     Hepatic Function Panel:    Lab Results   Component Value Date    ALKPHOS 120 03/25/2021    ALT 7 03/25/2021    AST 10 03/25/2021 PROT 6.8 03/25/2021    BILITOT 0.41 03/25/2021    LABALBU 3.1 03/25/2021     Calcium:    Lab Results   Component Value Date    CALCIUM 9.0 04/07/2021     Magnesium:    Lab Results   Component Value Date    MG 1.7 03/25/2021     Phosphorus:  No results found for: PHOS  PT/INR:    Lab Results   Component Value Date    PROTIME 14.9 04/06/2021    PROTIME 16.4 08/27/2015    PROTIME 16.4 08/27/2015    INR 1.2 04/06/2021     ABG:  No results found for: PHART, PH, AWR9AXY, PCO2, PO2ART, PO2, HKU8ZZA, HCO3, BEART, BE, THGBART, THB, CUU0DGG, K5NWCROZ, O2SAT  Urine Culture:  No components found for: CURINE  Blood Culture:  No components found for: CBLOOD, CFUNGUSBL  Stool Culture:  No components found for: CSTOOL    RADIOLOGY:   I have personally reviewed the following films:  Xr Shoulder Right (min 2 Views)    Result Date: 4/6/2021  EXAMINATION: THREE XRAY VIEWS OF THE RIGHT SHOULDER 4/6/2021 6:11 pm COMPARISON: None. HISTORY: ORDERING SYSTEM PROVIDED HISTORY: pain fall TECHNOLOGIST PROVIDED HISTORY: pain fall Reason for Exam: Pt complains of right hand pain with a hx of gout and left hip/lower back pain s/p fall from standing height today. Best images per pt condition Acuity: Unknown Type of Exam: Initial Mechanism of Injury: Pt complains of right hand pain with a hx of gout and left hip/lower back pain s/p fall from standing height today. Best images per pt condition FINDINGS: The visualized bones are normal. There is no evidence of fracture or dislocation. The  joint spaces appear well maintained. The soft tissues are unremarkable. Postsurgical changes overlying the mediastinum and lower cervical spine     No acute bony abnormalities are noted     Xr Radius Ulna Right (2 Views)    Result Date: 4/6/2021  EXAMINATION: TWO XRAY VIEWS OF THE RIGHT FOREARM 4/6/2021 6:11 pm COMPARISON: None.  HISTORY: ORDERING SYSTEM PROVIDED HISTORY: pain gout fall TECHNOLOGIST PROVIDED HISTORY: pain gout fall Reason for Exam: Pt complains of right hand pain with a hx of gout and left hip/lower back pain s/p fall from standing height today. Best images per pt condition Acuity: Unknown Type of Exam: Initial Mechanism of Injury: Pt complains of right hand pain with a hx of gout and left hip/lower back pain s/p fall from standing height today. Best images per pt condition FINDINGS: The visualized bones are normal. There is no evidence of fracture or dislocation. The  joint spaces appear well maintained. The soft tissues are unremarkable. Vascular calcifications are seen compatible with atherosclerotic disease. No acute bony abnormalities are noted     Xr Hand Left (min 3 Views)    Result Date: 4/6/2021  EXAMINATION: THREE XRAY VIEWS OF THE LEFT HAND; THREE XRAY VIEWS OF THE RIGHT HAND 4/6/2021 6:11 pm COMPARISON: None. HISTORY: ORDERING SYSTEM PROVIDED HISTORY: pain TECHNOLOGIST PROVIDED HISTORY: pain Reason for Exam: Pt complains of right hand pain with a hx of gout and left hip/lower back pain s/p fall from standing height today. Best images per pt condition Acuity: Unknown Type of Exam: Initial Mechanism of Injury: Pt complains of right hand pain with a hx of gout and left hip/lower back pain s/p fall from standing height today. Best images per pt condition; ORDERING SYSTEM PROVIDED HISTORY: pain gout fall TECHNOLOGIST PROVIDED HISTORY: pain gout fall Reason for Exam: Pt complains of right hand pain with a hx of gout and left hip/lower back pain s/p fall from standing height today. Best images per pt condition Acuity: Unknown Type of Exam: Initial Mechanism of Injury: Pt complains of right hand pain with a hx of gout and left hip/lower back pain s/p fall from standing height today. Best images per pt condition FINDINGS: The visualized bones are normal. There is no evidence of fracture or dislocation. The  joint spaces appear well maintained. The soft tissues are unremarkable.   Chondrocalcinosis involving the triangular fibrocartilage complex bilaterally more notably on the left. Vascular calcifications are seen compatible with atherosclerotic disease. No acute bony abnormalities are noted     Xr Hand Right (min 3 Views)    Result Date: 4/6/2021  EXAMINATION: THREE XRAY VIEWS OF THE LEFT HAND; THREE XRAY VIEWS OF THE RIGHT HAND 4/6/2021 6:11 pm COMPARISON: None. HISTORY: ORDERING SYSTEM PROVIDED HISTORY: pain TECHNOLOGIST PROVIDED HISTORY: pain Reason for Exam: Pt complains of right hand pain with a hx of gout and left hip/lower back pain s/p fall from standing height today. Best images per pt condition Acuity: Unknown Type of Exam: Initial Mechanism of Injury: Pt complains of right hand pain with a hx of gout and left hip/lower back pain s/p fall from standing height today. Best images per pt condition; ORDERING SYSTEM PROVIDED HISTORY: pain gout fall TECHNOLOGIST PROVIDED HISTORY: pain gout fall Reason for Exam: Pt complains of right hand pain with a hx of gout and left hip/lower back pain s/p fall from standing height today. Best images per pt condition Acuity: Unknown Type of Exam: Initial Mechanism of Injury: Pt complains of right hand pain with a hx of gout and left hip/lower back pain s/p fall from standing height today. Best images per pt condition FINDINGS: The visualized bones are normal. There is no evidence of fracture or dislocation. The  joint spaces appear well maintained. The soft tissues are unremarkable. Chondrocalcinosis involving the triangular fibrocartilage complex bilaterally more notably on the left. Vascular calcifications are seen compatible with atherosclerotic disease. No acute bony abnormalities are noted     Xr Hip Left (2-3 Views)    Result Date: 4/6/2021  EXAMINATION: TWO XRAY VIEWS OF THE LEFT HIP 4/6/2021 6:11 pm COMPARISON: None.  HISTORY: ORDERING SYSTEM PROVIDED HISTORY: fall pain TECHNOLOGIST PROVIDED HISTORY: fall pain Reason for Exam: Pt complains of right hand pain with a hx of gout and left hip/lower back pain s/p fall from standing height today. Best images per pt condition Acuity: Unknown Type of Exam: Initial Mechanism of Injury: Pt complains of right hand pain with a hx of gout and left hip/lower back pain s/p fall from standing height today. Best images per pt condition FINDINGS: The visualized bones are normal. There is no evidence of fracture or dislocation. The  joint spaces appear well maintained. The soft tissues are unremarkable. Vascular calcifications are seen compatible with atherosclerotic disease. No acute bony abnormalities are noted     Xr Femur Left (min 2 Views)    Result Date: 4/6/2021  EXAMINATION: 2 XRAY VIEWS OF THE LEFT FEMUR 4/6/2021 6:11 pm COMPARISON: None. HISTORY: ORDERING SYSTEM PROVIDED HISTORY: fall pain TECHNOLOGIST PROVIDED HISTORY: fall pain Reason for Exam: Pt complains of right hand pain with a hx of gout and left hip/lower back pain s/p fall from standing height today. Best images per pt condition Acuity: Unknown Type of Exam: Initial Mechanism of Injury: Pt complains of right hand pain with a hx of gout and left hip/lower back pain s/p fall from standing height today. Best images per pt condition FINDINGS: The visualized bones are normal. There is no evidence of fracture or dislocation. The  joint spaces appear well maintained. The soft tissues are unremarkable. Vascular calcifications are seen compatible with atherosclerotic disease.      No acute bony abnormalities are noted     Ct Head Wo Contrast    Result Date: 4/6/2021  EXAMINATION: CT OF THE HEAD WITHOUT CONTRAST; CT OF THE CERVICAL SPINE WITHOUT CONTRAST 4/6/2021 8:41 pm TECHNIQUE: CT of the head was performed without the administration of intravenous contrast. Dose modulation, iterative reconstruction, and/or weight based adjustment of the mA/kV was utilized to reduce the radiation dose to as low as reasonably achievable.; CT of the cervical spine was performed without the administration of intravenous contrast. Multiplanar reformatted images are provided for review. Dose modulation, iterative reconstruction, and/or weight based adjustment of the mA/kV was utilized to reduce the radiation dose to as low as reasonably achievable. COMPARISON: None. HISTORY: ORDERING SYSTEM PROVIDED HISTORY: fall [facility] TECHNOLOGIST PROVIDED HISTORY: fall [facility] Decision Support Exception->Emergency Medical Condition ([place]) Reason for Exam: fall. hit head. patient states pain on left side of body Acuity: Acute Type of Exam: Initial FINDINGS: CT HEAD: BRAIN/VENTRICLES: There is no acute intracranial hemorrhage, mass effect or midline shift. No abnormal extra-axial fluid collection. The gray-white differentiation is maintained without evidence of an acute infarct. There is no evidence of hydrocephalus. ORBITS: The visualized portion of the orbits demonstrate no acute abnormality. SINUSES: The visualized paranasal sinuses and mastoid air cells demonstrate no acute abnormality. Chronic right maxillary sinusitis. SOFT TISSUES/SKULL:  No acute abnormality of the visualized skull or soft tissues. CT CERVICAL SPINE: BONES/ALIGNMENT: There is no evidence of an acute cervical spine fracture. Anterior fusion of C5-C7 with plate and screws in C5 and C7. DEGENERATIVE CHANGES: Mild osteophytosis and facet arthropathy. SOFT TISSUES: There is no prevertebral soft tissue swelling. No acute intracranial abnormality. No acute fracture of the cervical spine. Anterior fusion of C5, C6 and C7 with plate and screws present screws are in C5 and C7.      Ct Cervical Spine Wo Contrast    Result Date: 4/6/2021  EXAMINATION: CT OF THE HEAD WITHOUT CONTRAST; CT OF THE CERVICAL SPINE WITHOUT CONTRAST 4/6/2021 8:41 pm TECHNIQUE: CT of the head was performed without the administration of intravenous contrast. Dose modulation, iterative reconstruction, and/or weight based adjustment of the mA/kV was utilized to reduce the radiation dose to as low as reasonably achievable.; CT of the cervical spine was performed without the administration of intravenous contrast. Multiplanar reformatted images are provided for review. Dose modulation, iterative reconstruction, and/or weight based adjustment of the mA/kV was utilized to reduce the radiation dose to as low as reasonably achievable. COMPARISON: None. HISTORY: ORDERING SYSTEM PROVIDED HISTORY: fall [facility] TECHNOLOGIST PROVIDED HISTORY: [facility] Decision Support Exception->Emergency Medical Condition ([place]) Reason for Exam: fall. hit head. patient states pain on left side of body Acuity: Acute Type of Exam: Initial FINDINGS: CT HEAD: BRAIN/VENTRICLES: There is no acute intracranial hemorrhage, mass effect or midline shift. No abnormal extra-axial fluid collection. The gray-white differentiation is maintained without evidence of an acute infarct. There is no evidence of hydrocephalus. ORBITS: The visualized portion of the orbits demonstrate no acute abnormality. SINUSES: The visualized paranasal sinuses and mastoid air cells demonstrate no acute abnormality. Chronic right maxillary sinusitis. SOFT TISSUES/SKULL:  No acute abnormality of the visualized skull or soft tissues. CT CERVICAL SPINE: BONES/ALIGNMENT: There is no evidence of an acute cervical spine fracture. Anterior fusion of C5-C7 with plate and screws in C5 and C7. DEGENERATIVE CHANGES: Mild osteophytosis and facet arthropathy. SOFT TISSUES: There is no prevertebral soft tissue swelling. No acute intracranial abnormality. No acute fracture of the cervical spine. Anterior fusion of C5, C6 and C7 with plate and screws present screws are in C5 and C7.      Ct Thoracic Spine Wo Contrast    Result Date: 4/6/2021  EXAMINATION: CT OF THE LUMBAR SPINE WITHOUT CONTRAST; CT OF THE THORACIC SPINE WITHOUT CONTRAST  4/6/2021 TECHNIQUE: CT of the lumbar spine was performed without the administration of intravenous contrast. Multiplanar reformatted radiation dose to as low as reasonably achievable.; CT of the thoracic spine was performed without the administration of intravenous contrast. Multiplanar reformatted images are provided for review. Dose modulation, iterative reconstruction, and/or weight based adjustment of the mA/kV was utilized to reduce the radiation dose to as low as reasonably achievable. COMPARISON: None HISTORY: ORDERING SYSTEM PROVIDED HISTORY: PAIN TECHNOLOGIST PROVIDED HISTORY: fall Decision Support Exception->Emergency Medical Condition ([place]) Reason for Exam: fall Acuity: Acute Type of Exam: Initial Relevant Medical/Surgical History: lumbar surgery; ORDERING SYSTEM PROVIDED HISTORY: pain fall TECHNOLOGIST PROVIDED HISTORY: pain fall Reason for Exam: fall Acuity: Acute Type of Exam: Initial FINDINGS: BONES/ALIGNMENT: Status post fusion of L4-5 with pedicle screws and stabilizing rodsThere is normal a is lignment of the thoracic and lumbar spine. The vertebral body heights are maintained. No osseous destructive lesion is seen. Is DEGENERATIVE CHANGES: .  Multilevel thoracic and upper lumbar spondylosis and mild degenerative disc disease. Central canal is intact. SOFT TISSUES/RETROPERITONEUM: No paraspinal mass is seen. Vascular calcifications are seen compatible with atherosclerotic disease. No acute bony abnormalities in the thoracic and lumbar spine is is     Xr Shoulder Left (min 2 Views)    Result Date: 4/6/2021  EXAMINATION: TWO XRAY VIEWS OF THE LEFT SHOULDER 4/6/2021 6:11 pm COMPARISON: None. HISTORY: ORDERING SYSTEM PROVIDED HISTORY: fall pain TECHNOLOGIST PROVIDED HISTORY: fall pain Reason for Exam: Pt complains of right hand pain with a hx of gout and left hip/lower back pain s/p fall from standing height today. Best images per pt condition Acuity: Unknown Type of Exam: Initial Mechanism of Injury: Pt complains of right hand pain with a hx of gout and left hip/lower back pain s/p fall from standing height today.  Best images per pt condition FINDINGS: The visualized bones are normal. There is no evidence of fracture or dislocation. The  joint spaces appear well maintained. The soft tissues are unremarkable. Postsurgical changes overlying the mediastinum and cervical spine     No acute bony abnormalities are noted     Ct Chest Abdomen Pelvis Wo Contrast    Result Date: 4/6/2021  EXAMINATION: CT OF THE CHEST, ABDOMEN, AND PELVIS WITHOUT CONTRAST 4/6/2021 8:41 pm TECHNIQUE: CT of the chest, abdomen and pelvis was performed without the administration of intravenous contrast. Multiplanar reformatted images are provided for review. Dose modulation, iterative reconstruction, and/or weight based adjustment of the mA/kV was utilized to reduce the radiation dose to as low as reasonably achievable. COMPARISON: None HISTORY: ORDERING SYSTEM PROVIDED HISTORY: fall on eliquis pain everywhere TECHNOLOGIST PROVIDED HISTORY: fall on eliquis pain everywhere Reason for Exam: fall. patient states pain on left side of body Acuity: Acute Type of Exam: Initial Relevant Medical/Surgical History: CHF, CKD FINDINGS: Chest: Mediastinum: The cardiac size is normal. Coronary artery calcifications status post CABG. .  There is no significant mediastinal, hilar or axillary lymphadenopathy. 1.5 cm hypodense right thyroid nodule. The esophagus shows no significant abnormalities. Lungs/pleura: Bandlike scarring/subsegmental atelectasis posterior left lung base. No focal consolidation. No significant nodules or masses. Soft Tissues/Bones: Median sternotomy. Abdomen/Pelvis: Organs: Limited evaluation the absence of IV contrast.  Cholecystectomy. The liver, spleen, pancreas, adrenal glands show no significant abnormalities. GI/Bowel: There is limited evaluation due to absence of oral contrast. Stomach grossly normal.  Small bowel shows no obstruction or focal lesions. Evaluation of the colon shows no acute process. Normal appendix.  Pelvis: Pelvic organs unremarkable. Peritoneum/Retroperitoneum: No ascites. No lymphadenopathy. Atherosclerotic calcifications. Bones/Soft Tissues: No acute abnormality of the bones. The superficial soft tissues show no significant abnormalities. 1. No acute infective or inflammatory process. 2. No acute osseous abnormality. 3. Severe diffuse atherosclerotic disease. 4. A 1.5 cm hypodense right thyroid nodule. Please see followup recommendations below. RECOMMENDATIONS: 1.5 cm incidental right thyroid nodule. Recommend thyroid US. Reference: J Am Toy Radiol. 2015 Feb;12(2): 143-50         IMPRESSION:   Syncope and fall from standing height   CT head/c-spine negative  CT thoracic/lumbar spine negative  CT chest/abdomen/pelvis negative  Various XR's all negative for acute bony abnormalities     does not have any pertinent problems on file. PLAN:   Diet as tolerated  Daily CBC, BMP  GI and DVT prophylaxis  Pain and nausea management  No acute general surgery intervention  Cardiology consultation  Continue medical management   History of same height fall. Negative work-up from trauma standpoint. Abnormal stress test.  Cardiology paged. Possible further work-up. Patient is n.p.o. for cardiology to decide the work-up. If there is no work-up planned today patient may eat. Thank you for this interesting consult and for allowing us to participate in the care of this patient. If you have any questions please don't hesitate to call.       Electronically signed by ULICES Liu  on 4/7/2021 at 7:28 AM

## 2021-04-07 NOTE — H&P
Family Medicine Admit Note    PCP: AFSANEH Sweeney CNP    Date of Admission: 4/6/2021    Date of Service: Pt seen/examined on 4/7/21 and Admitted to Inpatient. Chief Complaint:  Fall      History Of Present Illness: The patient is a 59 y.o. female who presents to [facility] with complaints of chest pain s/p fall at home. She reports feeling some chest pain before falling and it intensified afterwards. She fell from standing & landed on her left hip. She complains of pain in multiple joints. She states that she did lose consciousness and is on Eliquis. This am, she denies any chest pain and is feeling better. She denies any fevers, chills, cough, congestion, rhinorrhea, SOB, diarrhea, dysuria, hematuria, headaches or confusion. Past Medical History:        Diagnosis Date    Backache, unspecified     CHF (congestive heart failure) (HCC)     Coronary atherosclerosis of artery bypass graft     Cramp of limb     Gallstones     Hypertension     Insomnia     Pneumonia     Type II or unspecified type diabetes mellitus with renal manifestations, not stated as uncontrolled(250.40)     Type II or unspecified type diabetes mellitus without mention of complication, not stated as uncontrolled     Unspecified vitamin D deficiency        Past Surgical History:        Procedure Laterality Date    ANKLE FRACTURE SURGERY      BREAST SURGERY      CARPAL TUNNEL RELEASE      CORONARY ARTERY BYPASS GRAFT      x3    HAND SURGERY      KNEE ARTHROSCOPY      right    TONSILLECTOMY         Medications Prior to Admission:    Prior to Admission medications    Medication Sig Start Date End Date Taking?  Authorizing Provider   allopurinol (ZYLOPRIM) 100 MG tablet Take 1 tablet by mouth daily 3/31/21  Yes AFSANEH Sweeney CNP   febuxostat (ULORIC) 40 MG TABS tablet Take 1 tablet by mouth daily 3/31/21  Yes AFSANEH Sweeney CNP   traZODone (DESYREL) 50 MG tablet Take 75 mg by mouth nightly   Yes Historical Provider, MD   apixaban (ELIQUIS) 5 MG TABS tablet Take 1 tablet by mouth 2 times daily 3/30/21 4/29/21 Yes AFSANEH Helm CNP   bumetanide (BUMEX) 2 MG tablet Take 1 tablet by mouth daily 3/30/21  Yes AFSANEH Helm CNP   atorvastatin (LIPITOR) 40 MG tablet Take 1 tablet by mouth daily 3/30/21  Yes AFSANEH Helm CNP   busPIRone (BUSPAR) 5 MG tablet Take 1 tablet by mouth 2 times daily 3/30/21 4/29/21 Yes AFSANEH Helm CNP   carvedilol (COREG) 6.25 MG tablet Take 1 tablet by mouth 2 times daily 3/30/21  Yes AFSANEH Helm CNP   ranolazine (RANEXA) 500 MG extended release tablet Take 1 tablet by mouth 2 times daily 3/30/21  Yes AFSANEH Helm CNP   Insulin Degludec (TRESIBA FLEXTOUCH) 100 UNIT/ML SOPN Inject 85 Units into the skin daily 3/30/21 4/29/21 Yes AFSANEH Helm CNP   insulin glargine (LANTUS SOLOSTAR) 100 UNIT/ML injection pen Inject 15 Units into the skin three times daily 3/30/21  Yes AFSANEH Helm CNP   Liraglutide (VICTOZA) 18 MG/3ML SOPN SC injection Inject 1.8 mg into the skin daily 3/30/21  Yes AFSANEH Helm CNP   Insulin Pen Needle (BD ULTRA-FINE PEN NEEDLES) 29G X 12.7MM MISC 1 each by Does not apply route 6 times daily. For use with each insulin 12/23/14  Yes Zulema Velásquez MD   docusate sodium (COLACE) 100 MG capsule Take 100 mg by mouth 2 times daily. Yes Historical Provider, MD   glucose blood VI test strips (DEN CONTOUR TEST) strip 1 each by In Vitro route 3 times daily. As needed. 10/30/14  Yes Zulema Velásquez MD   Lancets 28G MISC Inject 1 each into the skin 3 times daily. 10/30/14  Yes Zulema Velásquez MD   aspirin 81 MG tablet Take 81 mg by mouth daily. Yes Historical Provider, MD   gabapentin (NEURONTIN) 100 MG capsule Take 2 capsules by mouth 2 times daily for 30 days.  3/31/21 4/30/21  AFSANEH Helm - CNP   nitroGLYCERIN (NITROSTAT) 0.4 MG SL tablet Place 1 tablet under the tongue every 5 minutes as needed for Chest pain. 3/29/15   AFSANEH Vegas - CNP       Allergies:  Adhesive tape, Ace inhibitors, and Metformin and related    Social History:  The patient currently lives at home    TOBACCO:   reports that she has never smoked. She has never used smokeless tobacco.  ETOH:   reports no history of alcohol use. Review of Systems - General ROS: positive for  - obesity  Psychological ROS: negative  Ophthalmic ROS: positive for - uses glasses  ENT ROS: negative  Endocrine ROS: positive for - hyperglycemia  Respiratory ROS: negative  Cardiovascular ROS: positive for - chest pain  Gastrointestinal ROS: negative  Musculoskeletal ROS: negative  Neurological ROS: positive for - numbness/tingling      Family History:          Problem Relation Age of Onset    Diabetes Father     Heart Failure Father        PHYSICAL EXAM:    BP (!) 158/72 Comment: manual  B/P  Pulse 77   Temp 97.7 °F (36.5 °C) (Oral)   Resp 20   Ht 5' 5\" (1.651 m)   Wt 232 lb 9.4 oz (105.5 kg)   SpO2 95%   BMI 38.70 kg/m²     General appearance: No apparent distress appears stated age and cooperative. HEENT Normal cephalic, atraumatic without obvious deformity. Pupils equal, round, and reactive to light. Extra ocular muscles intact. Conjunctivae/corneas clear. Neck: Supple, No jugular venous distention/bruits. Trachea midline without thyromegaly or adenopathy with full range of motion. Lungs: Clear to auscultation, bilaterally without Rales/Wheezes/Rhonchi with good respiratory effort. Heart: Regular rate and rhythm with Normal S1/S2 without murmurs, rubs or gallops, point of maximum impulse non-displaced  Abdomen: Soft, non-tender or non-distended without rigidity or guarding and positive bowel sounds all four quadrants. Extremities: No clubbing, cyanosis, or edema bilaterally. Full range of motion without deformity and normal gait intact. Skin: Skin color, texture, turgor normal.  No rashes or lesions.   Neurologic: Alert and oriented X 3, neurovascularly intact with sensory/motor intact upper extremities/lower extremities, bilaterally. Cranial nerves: II-XII intact, grossly non-focal.  Mental status: Alert, oriented, thought content appropriate. CXR:  I have reviewed the CXR with the following interpretation: not done  EKG:  I have reviewed the EKG with the following interpretation: NSR    CBC   Recent Labs     04/06/21 1949   WBC 10.2   HGB 11.6*   HCT 36.4         RENAL  Recent Labs     04/06/21 1949   *   K 4.4      CO2 18*   BUN 42*   CREATININE 2.05*     LFT'S  No results for input(s): AST, ALT, ALB, BILIDIR, BILITOT, ALKPHOS in the last 72 hours. COAG  Recent Labs     04/06/21 1949   INR 1.2     CARDIAC ENZYMES  No results for input(s): CKTOTAL, CKMB, CKMBINDEX, TROPONINI in the last 72 hours.     U/A:    Lab Results   Component Value Date    COLORU YELLOW 04/07/2021    WBCUA 0 TO 2 04/07/2021    RBCUA 0 TO 2 04/07/2021    MUCUS NOT REPORTED 04/07/2021    BACTERIA FEW 04/07/2021    SPECGRAV 1.015 04/07/2021    LEUKOCYTESUR NEGATIVE 04/07/2021    GLUCOSEU 2+ 04/07/2021    AMORPHOUS NOT REPORTED 04/07/2021       ABG  No results found for: LZS7RRN, BEART, X2CGIWGO, PHART, THGBART, LFW1GZM, PO2ART, CTT5PGS        Active Hospital Problems    Diagnosis Date Noted    Obesity, Class II, BMI 35-39.9 [E66.9] 04/07/2021    Syncope and collapse [R55] 04/06/2021    History of coronary artery bypass graft [Z95.1] 03/31/2021    Diabetic polyneuropathy associated with type 2 diabetes mellitus ([facility] [address] [E11.42] 02/17/2020    Stage 4 chronic kidney disease ([facility] [address] [N18.4] 08/06/2019    Chronic diastolic heart failure ([facility] [address] [I50.32] 09/20/2018    Type 2 diabetes mellitus with kidney complication, with long-term current use of insulin ([facility] [address] [E11.29, Z79.4] 09/20/2018    Spinal stenosis of lumbar region with neurogenic claudication [M48.062] 12/27/2016    Mixed hyperlipidemia [E78.2] 07/27/2016    Coronary artery disease [I25.10] 05/04/2015         ASSESSMENT/PLAN:  Patient admitted with Syncope and Collapse. Co-morbidities as above.     Orders Placed This Encounter   Procedures    Culture, Urine    CT HEAD WO CONTRAST    CT CERVICAL SPINE WO CONTRAST    XR FEMUR LEFT (MIN 2 VIEWS)    XR SHOULDER LEFT (MIN 2 VIEWS)    XR HAND RIGHT (MIN 3 VIEWS)    XR RADIUS ULNA RIGHT (2 VIEWS)    XR HAND LEFT (MIN 3 VIEWS)    XR SHOULDER RIGHT (MIN 2 VIEWS)    XR HIP LEFT (2-3 VIEWS)    CT CHEST ABDOMEN PELVIS WO CONTRAST    CT LUMBAR SPINE WO CONTRAST    CT THORACIC SPINE WO CONTRAST    NM Cardiac Stress Test Nuclear Imaging    Trauma Panel    Troponin    Brain Natriuretic Peptide    Urinalysis with Microscopic    Troponin    CBC    Basic Metabolic Panel    DIET GENERAL; Carb Control: 4 carb choices (60 gms)/meal    Diet NPO, After Midnight    Telemetry Monitoring    Vital signs per unit routine    Notify physician    Notify physician    Up with assistance    HYPOGLYCEMIA TREATMENT: blood glucose less than 50 mg/dL and patient  ALERT and TOLERATING PO    HYPOGLYCEMIA TREATMENT: blood glucose less than 70 mg/dL and patient ALERT and TOLERATING PO    HYPOGLYCEMIA TREATMENT: blood glucose less than 70 mg/dL and patient NOT ALERT or NPO    Orthostatic blood pressure and pulse    Place sequential compression device    Beta Blocker Management Prior to Cardiac Stress Test    Full Code    Inpatient consult to Primary Care Provider    Inpatient consult to General Surgery    Inpatient consult to Cardiology    Nasal Cannula Oxygen    Cardiac Stress Test- W Pharm    POCT Glucose    POCT glucose    POCT Glucose    POCT Glucose    POC Glucose Fingerstick    POC Glucose Fingerstick    EKG 12 Lead    EKG 12 Lead    EKG 12 Lead    Echocardiogram Limited 2D    PATIENT STATUS (DIRECT) Inpatient    PATIENT STATUS (FROM ED OR OR/PROCEDURAL) Inpatient         DVT Prophylaxis: Eliquis  Diet: DIET GENERAL; Carb Control: 4 carb choices (60 gms)/meal  Code Status: Full Code      Dispo - admitted to Med/Surg       @[facility]@

## 2021-04-07 NOTE — PROCEDURES
[address] [facility]                    [address] [facility]                              CARDIAC STRESS TEST    PATIENT NAME: Linus Dietrich                  :        1960  MED REC NO:   [id number]                              ROOM:       [id number]  ACCOUNT NO:   [de-identified]                           ADMIT DATE: 2021  PROVIDER:     Yamileth Lamb    DATE OF STUDY:  2021    TEST TYPE: LEXISCAN CARDIOLYTE STRESS TEST  INDICATION: CHEST PAIN  REFERRING PHYSICIAN: WILY KUMARI    RESTING HEART RATE: 64 BEATS PER MINUTE  RESTING BLOOD PRESSURE: 150/64    MEDICATION(S) GIVEN: 0.4MG IV LEXISCAN  REASON FOR TERMINATION: MEDICATION INFUSION COMPLETE    RESTING EKG: ABNORMAL, SINUS RHYTHM, 61 BEATS PER MINUTE, PREMATURE  ATRIAL CONTRACTION, NON-SPECIFIC T-WAVE CHANGES  STRESS HEART RESPONSE: NORMAL RESPONSE  BLOOD PRESSURE RESPONSE: APPROPRIATE  STRESS EKGs: NO CHANGES SEEN  CHEST DISCOMFORT: NO PAIN DURING STRESS  ISCHEMIC EKG CHANGES: NONE    EKG IMPRESSION: ELECTROCARDIOGRAPHICALLY NEGATIVE LEXISCAN STRESS TEST. RADIOISOTOPE RESULTS TO FOLLOW FROM THE DEPARTMENT OF NUCLEAR MEDICINE.     Babtia Boone    D: 2021 14:31:47       T: 2021 14:34:55     AS/EMMA  Job#: [id number]     Doc#: Unknown    CC:    (Retain this field even if not dictated or not decipherable)

## 2021-04-07 NOTE — CONSULTS
not stated as uncontrolled, and Unspecified vitamin D deficiency. PSH:   has a past surgical history that includes Coronary artery bypass graft; Knee arthroscopy; Carpal tunnel release; Breast surgery; Tonsillectomy; Hand surgery; and Ankle fracture surgery. Allergies: Allergies   Allergen Reactions    Adhesive Tape Other (See Comments) and Rash     Other reaction(s): Unknown    Ace Inhibitors Other (See Comments)     cough    Metformin And Related Hives, Itching and Rash        Home Meds:    Prior to Admission medications    Medication Sig Start Date End Date Taking?  Authorizing Provider   allopurinol (ZYLOPRIM) 100 MG tablet Take 1 tablet by mouth daily 3/31/21  Yes AFSANEH Krishna CNP   febuxostat (ULORIC) 40 MG TABS tablet Take 1 tablet by mouth daily 3/31/21  Yes AFSANEH Krishna CNP   traZODone (DESYREL) 50 MG tablet Take 75 mg by mouth nightly   Yes Historical Provider, MD   apixaban (ELIQUIS) 5 MG TABS tablet Take 1 tablet by mouth 2 times daily 3/30/21 4/29/21 Yes AFSANEH Krishna CNP   bumetanide (BUMEX) 2 MG tablet Take 1 tablet by mouth daily 3/30/21  Yes AFSANEH Krishna CNP   atorvastatin (LIPITOR) 40 MG tablet Take 1 tablet by mouth daily 3/30/21  Yes AFSANEH Krishna CNP   busPIRone (BUSPAR) 5 MG tablet Take 1 tablet by mouth 2 times daily 3/30/21 4/29/21 Yes AFSANEH Krishna CNP   carvedilol (COREG) 6.25 MG tablet Take 1 tablet by mouth 2 times daily 3/30/21  Yes AFSANEH Krishna CNP   ranolazine (RANEXA) 500 MG extended release tablet Take 1 tablet by mouth 2 times daily 3/30/21  Yes AFSANEH Krishna CNP   Insulin Degludec (TRESIBA FLEXTOUCH) 100 UNIT/ML SOPN Inject 85 Units into the skin daily 3/30/21 4/29/21 Yes AFSANEH Krishna CNP   insulin glargine (LANTUS SOLOSTAR) 100 UNIT/ML injection pen Inject 15 Units into the skin three times daily 3/30/21  Yes AFSANEH Krishna - CNP   Liraglutide (VICTOZA) 18 MG/3ML SOPN SC injection Inject 1.8 mg into the skin daily 3/30/21  Yes AFSANEH Moran CNP   Insulin Pen Needle (BD ULTRA-FINE PEN NEEDLES) 29G X 12.7MM MISC 1 each by Does not apply route 6 times daily. For use with each insulin 12/23/14  Yes Kee Diaz MD   docusate sodium (COLACE) 100 MG capsule Take 100 mg by mouth 2 times daily. Yes Historical Provider, MD   glucose blood VI test strips (DEN CONTOUR TEST) strip 1 each by In Vitro route 3 times daily. As needed. 10/30/14  Yes Kee Diaz MD   Lancets 28G MISC Inject 1 each into the skin 3 times daily. 10/30/14  Yes Kee Diaz MD   aspirin 81 MG tablet Take 81 mg by mouth daily. Yes Historical Provider, MD   gabapentin (NEURONTIN) 100 MG capsule Take 2 capsules by mouth 2 times daily for 30 days. 3/31/21 4/30/21  AFSANEH Moran CNP   nitroGLYCERIN (NITROSTAT) 0.4 MG SL tablet Place 1 tablet under the tongue every 5 minutes as needed for Chest pain.  3/29/15   Sammi Thornton, 721 [employer] Meds:    Current Facility-Administered Medications   Medication Dose Route Frequency Provider Last Rate Last Admin    sodium chloride flush 0.9 % injection 5-40 mL  5-40 mL Intravenous 2 times per day Marleta Colla, DO        sodium chloride flush 0.9 % injection 5-40 mL  5-40 mL Intravenous PRN Marleta Colla, DO        0.9 % sodium chloride infusion  25 mL Intravenous PRN Marleta Colla, DO        acetaminophen (TYLENOL) tablet 650 mg  650 mg Oral Q4H PRN Marleta Colla, DO        oxyCODONE (ROXICODONE) immediate release tablet 5 mg  5 mg Oral Q4H PRN Marleta Colla, DO        Or    oxyCODONE HCl (OXY-IR) immediate release tablet 10 mg  10 mg Oral Q4H PRN Marleta Colla, DO   10 mg at 04/07/21 0046    fentaNYL (SUBLIMAZE) injection 25 mcg  25 mcg Intravenous Q1H PRN Marleta Colla, DO        Or    fentaNYL (SUBLIMAZE) injection 50 mcg  50 mcg Intravenous Q1H PRN Marleta Colla, DO        ondansetron (ZOFRAN) injection 4 mg  4 mg Intravenous Q8H PRN Roma Wang,         aspirin EC tablet 81 mg  81 mg Oral Daily Nick Ormond, MD        docusate sodium (COLACE) capsule 100 mg  100 mg Oral BID Nick Ormond, MD        nitroGLYCERIN (NITROSTAT) SL tablet 0.4 mg  0.4 mg Sublingual Q5 Min PRN Nick Ormond, MD        traZODone (DESYREL) tablet 75 mg  75 mg Oral Nightly Nick Ormond, MD        apixaban (ELIQUIS) tablet 5 mg  5 mg Oral BID Nick Ormond, MD        bumetanide (BUMEX) tablet 2 mg  2 mg Oral Daily Nick Ormond, MD        atorvastatin (LIPITOR) tablet 40 mg  40 mg Oral Daily Nick Ormond, MD        busPIRone (BUSPAR) tablet 5 mg  5 mg Oral BID Nick Ormond, MD        carvedilol (COREG) tablet 6.25 mg  6.25 mg Oral BID Kyung Johnson MD        ranolazine (RANEXA) extended release tablet 500 mg  500 mg Oral BID Nick Ormond, MD        Insulin Degludec SOPN 85 Units  85 Units Subcutaneous Daily Nick Ormond, MD        insulin glargine (LANTUS;BASAGLAR) injection pen 15 Units  15 Units Subcutaneous TID Nick Ormond, MD        allopurinol (ZYLOPRIM) tablet 100 mg  100 mg Oral Daily Nick Ormond, MD        febuxostat (ULORIC) tablet 40 mg  40 mg Oral Daily Nick Ormond, MD        gabapentin (NEURONTIN) capsule 200 mg  200 mg Oral BID Nick Ormond, MD        glucose (GLUTOSE) 40 % oral gel 15 g  15 g Oral PRN Nick Ormond, MD        dextrose 50 % IV solution  12.5 g Intravenous PRN Nick Ormond, MD        glucagon (rDNA) injection 1 mg  1 mg Intramuscular PRN Nick Ormond, MD        dextrose 5 % solution  100 mL/hr Intravenous PRN Nick Ormond, MD        insulin lispro (HUMALOG) injection vial 0-12 Units  0-12 Units Subcutaneous TID WC Nick Ormond, MD        insulin lispro (HUMALOG) injection vial 0-6 Units  0-6 Units Subcutaneous Nightly Nick Ormond, MD        regadenoson (LEXISCAN) injection 0.4 mg  0.4 mg Intravenous ONCE PRN Kyung Johnson MD        sodium chloride flush 0.9 % injection 5-40 mL  5-40 mL Intravenous PRN Ophelia Johnson MD        0.9 % sodium chloride infusion  500 mL Intravenous Continuous PRN Ophelia Johnson MD        atropine injection 0.5 mg  0.5 mg Intravenous Q5 Min NADIR Johnson MD        nitroGLYCERIN (NITROSTAT) SL tablet 0.4 mg  0.4 mg Sublingual Q5 Min PRANAHY Johnson MD        metoprolol (LOPRESSOR) injection 5 mg  5 mg Intravenous Q5 Min PRANAHY Johnson MD        aminophylline injection 50 mg  50 mg Intravenous PRANAHY Johnson MD        sodium chloride flush 0.9 % injection 10 mL  10 mL Intravenous PRN Ophelia Johnson MD   10 mL at 04/07/21 0814    [START ON 4/8/2021] pantoprazole (PROTONIX) tablet 40 mg  40 mg Oral QAM MARYAM Spivey MD           Social History:    Social History     Socioeconomic History    Marital status: Single     Spouse name: Not on file    Number of children: Not on file    Years of education: Not on file    Highest education level: Not on file   Occupational History    Not on file   Social Needs    Financial resource strain: Not hard at all   [employer] insecurity     Worry: Not on file     Inability: Not on file   [employer] needs     Medical: Not on file     Non-medical: Not on file   Tobacco Use    Smoking status: Never Smoker    Smokeless tobacco: Never Used   Substance and Sexual Activity    Alcohol use: No    Drug use: No    Sexual activity: Not on file   Lifestyle    Physical activity     Days per week: Not on file     Minutes per session: Not on file    Stress: Not on file   Relationships    Social connections     Talks on phone: Not on file     Gets together: Not on file     Attends [employer] service: Not on file     Active member of club or organization: Not on file     Attends meetings of clubs or organizations: Not on file     Relationship status: Not on file    Intimate partner violence     Fear of current or ex partner: Not on file Emotionally abused: Not on file     Physically abused: Not on file     Forced sexual activity: Not on file   Other Topics Concern    Not on file   Social History Narrative    Not on file       Family History:    Family History   Problem Relation Age of Onset    Diabetes Father     Heart Failure Father        Review of Systems:      · Constitutional: no weight loss or gain, no fever or chills. · Eyes: No new visual changes. · ENT: No new hearing loss. · Cardiovascular: see HPI. · Respiratory: No cough. No hemoptysis. · Gastrointestinal: No abdominal pain, no blood in stools. · Genitourinary: No hematuria or increased frequency. · Musculoskeletal:  No new gait disturbance. · Integumentary: No rash or pruritis. · Neurological: No headache. No seizures or recent CVA/TIA. · Psychiatric: No anxiety or depression. · Hematologic/Lymphatic: No abnormal bruising or bleeding. · Allergic/Immunologic: No new allergies. Physical Exam   Vital Signs: BP (!) 152/66   Pulse 60   Temp 97.3 °F (36.3 °C) (Oral)   Resp 20   Ht 5' 5\" (1.651 m)   Wt 232 lb 9.4 oz (105.5 kg)   SpO2 95%   BMI 38.70 kg/m²  O2 Flow Rate (L/min): 0 L/min     Admission Weight: 225 lb (102.1 kg)     General appearance: Awake, Alert, well developed, well nourished, pleasant. Cooperative. Head: Normocephalic, atraumatic. Eyes: PERRLA, EOM intact. Neck: no JVD, no carotid bruits, no thyromegaly. Chest: Clear bilaterally. No chest wall tenderness. No wheezing. Cardiac: Regular rate and rhythm, no S3 or S4 gallop, no murmur or rubs or clicks. Abdomen: Soft, non-tender. No hepatosplenomegaly. Extremities: no cyanosis or clubbing or leg edema. No calf tenderness. Pulses:  Bilaterally symmetrical and intact radial and femoral pulses. Neurologic:  Able to move all 4 extremities. Skin:  No rashes. Warm and dry.       EKG 4/6/2021:     Sinus rhythm with Premature atrial complexes with Aberrant conduction Urine NEGATIVE NEGATIVE    Ketones, Urine NEGATIVE NEGATIVE    Specific [place], UA 1.015 1.000 - 1.030    Urine Hgb NEGATIVE NEGATIVE    pH, UA 5.5 5.0 - 8.0    Protein, UA 2+ (A) NEGATIVE    Urobilinogen, Urine Normal Normal    Nitrite, Urine NEGATIVE NEGATIVE    Leukocyte Esterase, Urine NEGATIVE NEGATIVE    Urinalysis Comments NOT REPORTED     -          WBC, UA 0 TO 2 /HPF    RBC, UA 0 TO 2 /HPF    Casts UA HYALINE /LPF    Casts UA 0 TO 2 /LPF    Crystals, UA NOT REPORTED None /HPF    Epithelial Cells UA 10 TO 20 /HPF    Renal Epithelial, UA NOT REPORTED 0 /HPF    Bacteria, UA FEW (A) None    Mucus, UA NOT REPORTED None    Trichomonas, UA NOT REPORTED None    Amorphous, UA NOT REPORTED None    Other Observations UA NOT REPORTED NOT REQ.     Yeast, UA FEW (A) None   POC Glucose Fingerstick    Collection Time: 04/07/21  6:13 AM   Result Value Ref Range    POC Glucose 182 (H) 65 - 105 mg/dL   EKG 12 Lead    Collection Time: 04/07/21  6:30 AM   Result Value Ref Range    Ventricular Rate 59 BPM    Atrial Rate 59 BPM    P-R Interval 168 ms    QRS Duration 102 ms    Q-T Interval 444 ms    QTc Calculation (Bazett) 439 ms    P Axis 59 degrees    R Axis 26 degrees    T Axis -66 degrees   CBC    Collection Time: 04/07/21  6:46 AM   Result Value Ref Range    WBC 8.0 3.5 - 11.0 k/uL    RBC 3.64 (L) 4.0 - 5.2 m/uL    Hemoglobin 10.7 (L) 12.0 - 16.0 g/dL    Hematocrit 33.4 (L) 36 - 46 %    MCV 91.7 80 - 100 fL    MCH 29.5 26 - 34 pg    MCHC 32.2 31 - 37 g/dL    RDW 16.4 (H) 11.5 - 14.9 %    Platelets 083 450 - 892 k/uL    MPV 8.0 6.0 - 12.0 fL    NRBC Automated NOT REPORTED per 100 WBC   Basic Metabolic Panel    Collection Time: 04/07/21  6:46 AM   Result Value Ref Range    Glucose 206 (H) 70 - 99 mg/dL    BUN 40 (H) 8 - 23 mg/dL    CREATININE 1.96 (H) 0.50 - 0.90 mg/dL    Bun/Cre Ratio NOT REPORTED 9 - 20    Calcium 9.0 8.6 - 10.4 mg/dL    Sodium 141 135 - 144 mmol/L    Potassium 3.9 3.7 - 5.3 mmol/L    Chloride 110 (H) 98 - 107 mmol/L    CO2 22 20 - 31 mmol/L    Anion Gap 9 9 - 17 mmol/L    GFR Non-African American 26 (L) >60 mL/min    GFR  31 (L) >60 mL/min    GFR Comment          GFR Staging NOT REPORTED          2 D ECHOCARDIOGRAM Oct 2019:      Left Ventricle: Systolic function is normal with an ejection fraction   of 55-60%. IMPRESSION:    Recurrent syncope. Etiology unknown. Atypical Chest pain-ruled out for myocardial infarction. ASCAD, prior CABG x3 with LIMA to mid LAD, SVG to D1, SVG to OM3 in 2005, FADIA of RCA in 2015, all 3 grafts patent, patent RCA stent site, severe disease distal to anastomosis of SVG to OM3 not amenable to PCI on cardiac catheterization October 2019. Abnormal EKG. Paroxysmal atrial fibrillation- on Eliquis therapy. Currently in sinus rhythm. Mild mitral regurgitation. Mild tricuspid regurgitation. Essential hypertension. Hyperlipidemia. Diabetes mellitus. Chronic kidney disease stage 4. History of anxiety disorder. History of COVID-19 positive test February 23, 2021. Was hospitalized and discharged from [facility] on 03/03/2021. Other problems as charted. REC/PLAN:     As ordered. Aspirin 81 mg daily, atorvastatin 40 mg daily, Coreg 6.25 mg b.i.d., Eliquis 5 mg b.i.d., Ranexa 500 mg b.i.d., nitroglycerin 0.4 mg sublingual p.r.n. chest pain, Bumex at prior home dose,  Protonix, O2 as needed, morphine as needed, other supportive care. Will repeat a limited 2D echocardiogram to reassess LVEF and rule out any pericardial effusion etc..      Will order a Lexiscan nuclear stress test to rule out any significant stress-induced myocardial ischemia. Will check orthostatic blood pressure and pulse. Advised patient to avoid sudden positional changes and to keep well hydrated. No family members available at bedside to discuss. Will follow. Discussed with the nurses.       Thank you once again for your kind consultation. Electronically signed by Rolando Mcgill MD, [facility] [place]      PLEASE NOTE:  This progress note was completed using a voice transcription system. Every effort was made to ensure accuracy. However, inadvertent computerized transcription errors may be present.

## 2021-04-07 NOTE — PROGRESS NOTES
Spoke with Florencia Woods, who is covering for [facility] Cardiology doctors, told her about pt's past hx of CHF, HTN, diabetes, and coronary Artery Bypass graft on 2004, and new consult, and that the pt's telemetry shows a SR in the 70's, and last B/P was 158/72  No new orders received at this time

## 2021-04-07 NOTE — CARE COORDINATION
CASE MANAGEMENT NOTE:    Admission Date:  4/6/2021 Jade Nobles is a 59 y.o.  female    Admitted for : Syncope and collapse [R55]    Met with:  Patient    PCP:  Nancey Seip                                Insurance:  [employer] [place]       Is patient is a : No    Is patient alert and oriented at time of discussion:  Yes    Current Residence/ Living Arrangements:  independently at home , Parents live w/ her           Current Services PTA:  Yes, Was to start Therapy at [facility] on [facility], today. Does patient go to outpatient dialysis: No  If yes, location and chair time: NA    Is patient agreeable to VNS: No    Freedom of choice provided:  No    List of [address] provided: No    VNS chosen:  No, Denies need, wants to go to outpt therapy    DME:  walker    Home Oxygen: No    Nebulizer: No    CPAP/BIPAP: No    Supplier: No    Potential Assistance Needed: No    SNF needed: No    Freedom of choice and list provided: NA    Pharmacy:  [facility]       Does Patient want to use MEDS to BEDS? No    Is patient currently receiving oral anticoagulation therapy? Yes,Kofi PTA    Is the Patient an [facility] with Readmission Risk Score greater than 14%? No  If yes, pt needs a follow up appointment made within 7 days. Family Members/Caregivers that pt would like involved in their care:    Yes    If yes, list name here:  Deb Pacheco    Transportation Provider:  Patient             Discharge Plan:  4/7/21 [employer] [place] Pt. Lives in a Condo w/ Ramp, Her Parents, live with her. Dad is Currently at [address] AN[facility] [place]. Was to start Therapy at [facility]. Denies VNS. Eliquis, PTA.  Cardio, Stress/Echo, Surgery on board, Denies needs, will follow//KB                 Electronically signed by: Naomi Syed RN on 4/7/2021 at 12:48 PM

## 2021-04-07 NOTE — PROGRESS NOTES
Spoke with Jt Solorzano, from ER, Dr. Noah Velasco, was already consulted, regarding the new consult, per the ER staff.

## 2021-04-07 NOTE — ED NOTES
Report given to Abel Madera from [place]. Report method by phone   The following was reviewed with receiving RN:   Current vital signs:  BP (!) 180/70   Pulse 63   Temp 98.3 °F (36.8 °C) (Oral)   Resp 20   Ht 5' 5\" (1.651 m)   Wt 225 lb (102.1 kg)   SpO2 100%   BMI 37.44 kg/m²                MEWS Score: 1     Any medication or safety alerts were reviewed. Any pending diagnostics and notifications were also reviewed, as well as any safety concerns or issues, abnormal labs, abnormal imaging, and abnormal assessment findings. Questions were answered.             Reginald Iyer RN  04/06/21 [id number]

## 2021-04-07 NOTE — PROGRESS NOTES
CST Lexiscan. Stress Tech performs patient preparation of physical comfort, review test procedures, pre-stress EKG. Lung Sounds clear t/o. Consent verified by pt. .  Educated patient on test procedure an possible side effects of Lexiscan as well as s/s to report. Cardiologist reviewed pre-test EKG and is present for test.  Patient tolerated test well with minor heart racing and chest heaviness, both of which resolved to baseline after test with caffeine. Start /64 HR 64  Stop /77 HR 65  EKG portion of testing is completed and negative, nuc. med. portion is still pending.

## 2021-04-08 VITALS
HEIGHT: 65 IN | BODY MASS INDEX: 38.75 KG/M2 | TEMPERATURE: 97.4 F | DIASTOLIC BLOOD PRESSURE: 55 MMHG | SYSTOLIC BLOOD PRESSURE: 140 MMHG | OXYGEN SATURATION: 100 % | WEIGHT: 232.59 LBS | RESPIRATION RATE: 20 BRPM | HEART RATE: 78 BPM

## 2021-04-08 LAB
ABSOLUTE EOS #: 0.3 K/UL (ref 0–0.4)
ABSOLUTE IMMATURE GRANULOCYTE: ABNORMAL K/UL (ref 0–0.3)
ABSOLUTE LYMPH #: 1.8 K/UL (ref 1–4.8)
ABSOLUTE MONO #: 0.7 K/UL (ref 0.1–1.3)
ANION GAP SERPL CALCULATED.3IONS-SCNC: 11 MMOL/L (ref 9–17)
BASOPHILS # BLD: 1 % (ref 0–2)
BASOPHILS ABSOLUTE: 0.1 K/UL (ref 0–0.2)
BUN BLDV-MCNC: 47 MG/DL (ref 8–23)
BUN/CREAT BLD: ABNORMAL (ref 9–20)
CALCIUM SERPL-MCNC: 9.1 MG/DL (ref 8.6–10.4)
CHLORIDE BLD-SCNC: 106 MMOL/L (ref 98–107)
CO2: 20 MMOL/L (ref 20–31)
CREAT SERPL-MCNC: 2.22 MG/DL (ref 0.5–0.9)
CULTURE: NORMAL
DIFFERENTIAL TYPE: ABNORMAL
EKG ATRIAL RATE: 69 BPM
EKG ATRIAL RATE: 71 BPM
EKG P AXIS: 63 DEGREES
EKG P AXIS: 67 DEGREES
EKG P-R INTERVAL: 156 MS
EKG P-R INTERVAL: 158 MS
EKG Q-T INTERVAL: 432 MS
EKG Q-T INTERVAL: 446 MS
EKG QRS DURATION: 102 MS
EKG QRS DURATION: 96 MS
EKG QTC CALCULATION (BAZETT): 469 MS
EKG QTC CALCULATION (BAZETT): 477 MS
EKG R AXIS: -4 DEGREES
EKG R AXIS: 26 DEGREES
EKG T AXIS: 67 DEGREES
EKG T AXIS: 86 DEGREES
EKG VENTRICULAR RATE: 69 BPM
EKG VENTRICULAR RATE: 71 BPM
EOSINOPHILS RELATIVE PERCENT: 3 % (ref 0–4)
GFR AFRICAN AMERICAN: 27 ML/MIN
GFR NON-AFRICAN AMERICAN: 22 ML/MIN
GFR SERPL CREATININE-BSD FRML MDRD: ABNORMAL ML/MIN/{1.73_M2}
GFR SERPL CREATININE-BSD FRML MDRD: ABNORMAL ML/MIN/{1.73_M2}
GLUCOSE BLD-MCNC: 160 MG/DL (ref 65–105)
GLUCOSE BLD-MCNC: 184 MG/DL (ref 70–99)
GLUCOSE BLD-MCNC: 201 MG/DL (ref 65–105)
HCT VFR BLD CALC: 34.1 % (ref 36–46)
HEMOGLOBIN: 10.9 G/DL (ref 12–16)
IMMATURE GRANULOCYTES: ABNORMAL %
LYMPHOCYTES # BLD: 16 % (ref 24–44)
Lab: NORMAL
MCH RBC QN AUTO: 29.2 PG (ref 26–34)
MCHC RBC AUTO-ENTMCNC: 31.9 G/DL (ref 31–37)
MCV RBC AUTO: 91.4 FL (ref 80–100)
MONOCYTES # BLD: 6 % (ref 1–7)
NRBC AUTOMATED: ABNORMAL PER 100 WBC
PDW BLD-RTO: 16.2 % (ref 11.5–14.9)
PLATELET # BLD: 263 K/UL (ref 150–450)
PLATELET ESTIMATE: ABNORMAL
PMV BLD AUTO: 8.1 FL (ref 6–12)
POTASSIUM SERPL-SCNC: 4.5 MMOL/L (ref 3.7–5.3)
RBC # BLD: 3.73 M/UL (ref 4–5.2)
RBC # BLD: ABNORMAL 10*6/UL
SEG NEUTROPHILS: 74 % (ref 36–66)
SEGMENTED NEUTROPHILS ABSOLUTE COUNT: 8.7 K/UL (ref 1.3–9.1)
SODIUM BLD-SCNC: 137 MMOL/L (ref 135–144)
SPECIMEN DESCRIPTION: NORMAL
WBC # BLD: 11.6 K/UL (ref 3.5–11)
WBC # BLD: ABNORMAL 10*3/UL

## 2021-04-08 PROCEDURE — 85025 COMPLETE CBC W/AUTO DIFF WBC: CPT

## 2021-04-08 PROCEDURE — 80048 BASIC METABOLIC PNL TOTAL CA: CPT

## 2021-04-08 PROCEDURE — 99239 HOSP IP/OBS DSCHRG MGMT >30: CPT | Performed by: FAMILY MEDICINE

## 2021-04-08 PROCEDURE — 6370000000 HC RX 637 (ALT 250 FOR IP): Performed by: INTERNAL MEDICINE

## 2021-04-08 PROCEDURE — 93010 ELECTROCARDIOGRAM REPORT: CPT | Performed by: INTERNAL MEDICINE

## 2021-04-08 PROCEDURE — 82947 ASSAY GLUCOSE BLOOD QUANT: CPT

## 2021-04-08 PROCEDURE — 6370000000 HC RX 637 (ALT 250 FOR IP): Performed by: STUDENT IN AN ORGANIZED HEALTH CARE EDUCATION/TRAINING PROGRAM

## 2021-04-08 PROCEDURE — 6370000000 HC RX 637 (ALT 250 FOR IP): Performed by: FAMILY MEDICINE

## 2021-04-08 PROCEDURE — 36415 COLL VENOUS BLD VENIPUNCTURE: CPT

## 2021-04-08 PROCEDURE — 93005 ELECTROCARDIOGRAM TRACING: CPT | Performed by: INTERNAL MEDICINE

## 2021-04-08 PROCEDURE — 2580000003 HC RX 258: Performed by: STUDENT IN AN ORGANIZED HEALTH CARE EDUCATION/TRAINING PROGRAM

## 2021-04-08 RX ORDER — PANTOPRAZOLE SODIUM 40 MG/1
40 TABLET, DELAYED RELEASE ORAL
Qty: 30 TABLET | Refills: 3 | Status: SHIPPED | OUTPATIENT
Start: 2021-04-09 | End: 2021-04-16 | Stop reason: DRUGHIGH

## 2021-04-08 RX ORDER — ISOSORBIDE MONONITRATE 30 MG/1
30 TABLET, EXTENDED RELEASE ORAL DAILY
Qty: 30 TABLET | Refills: 3 | Status: ON HOLD | OUTPATIENT
Start: 2021-04-09 | End: 2021-08-25 | Stop reason: HOSPADM

## 2021-04-08 RX ORDER — BUMETANIDE 1 MG/1
1 TABLET ORAL DAILY
Status: DISCONTINUED | OUTPATIENT
Start: 2021-04-09 | End: 2021-04-08 | Stop reason: HOSPADM

## 2021-04-08 RX ADMIN — DOCUSATE SODIUM 100 MG: 100 CAPSULE, LIQUID FILLED ORAL at 08:59

## 2021-04-08 RX ADMIN — ASPIRIN 81 MG: 81 TABLET, COATED ORAL at 08:59

## 2021-04-08 RX ADMIN — INSULIN GLARGINE 15 UNITS: 100 INJECTION, SOLUTION SUBCUTANEOUS at 10:15

## 2021-04-08 RX ADMIN — BUSPIRONE HYDROCHLORIDE 5 MG: 5 TABLET ORAL at 08:59

## 2021-04-08 RX ADMIN — ISOSORBIDE MONONITRATE 30 MG: 30 TABLET, EXTENDED RELEASE ORAL at 08:59

## 2021-04-08 RX ADMIN — OXYCODONE HYDROCHLORIDE 10 MG: 10 TABLET ORAL at 10:17

## 2021-04-08 RX ADMIN — PANTOPRAZOLE SODIUM 40 MG: 40 TABLET, DELAYED RELEASE ORAL at 06:27

## 2021-04-08 RX ADMIN — RANOLAZINE 500 MG: 500 TABLET, FILM COATED, EXTENDED RELEASE ORAL at 08:59

## 2021-04-08 RX ADMIN — Medication 10 ML: at 09:02

## 2021-04-08 RX ADMIN — ALLOPURINOL 100 MG: 100 TABLET ORAL at 08:59

## 2021-04-08 RX ADMIN — BUMETANIDE 2 MG: 1 TABLET ORAL at 08:59

## 2021-04-08 RX ADMIN — FEBUXOSTAT 40 MG: 40 TABLET, FILM COATED ORAL at 10:16

## 2021-04-08 RX ADMIN — GABAPENTIN 200 MG: 100 CAPSULE ORAL at 08:58

## 2021-04-08 RX ADMIN — ATORVASTATIN CALCIUM 40 MG: 40 TABLET, FILM COATED ORAL at 08:59

## 2021-04-08 RX ADMIN — INSULIN GLARGINE 85 UNITS: 100 INJECTION, SOLUTION SUBCUTANEOUS at 10:14

## 2021-04-08 RX ADMIN — APIXABAN 5 MG: 5 TABLET, FILM COATED ORAL at 10:15

## 2021-04-08 ASSESSMENT — PAIN SCALES - GENERAL
PAINLEVEL_OUTOF10: 4
PAINLEVEL_OUTOF10: 4

## 2021-04-08 NOTE — CARE COORDINATION
ONGOING DISCHARGE PLAN:    Patient is alert and oriented x4. Spoke with patient regarding discharge plan and patient confirms that plan is still to return to home. Pt. Denies VNS. Pt. Wants to Follow at [facility] for Therapy as PTA. Cardio/Stress test yesterday. No Cath per Cardio. Anticipate DC today, W/ no needs. Will continue to follow for additional discharge needs.     Electronically signed by Jakob Valenzuela RN on 4/8/2021 at 9:47 AM

## 2021-04-08 NOTE — PLAN OF CARE
Problem: Falls - Risk of:  Goal: Will remain free from falls  Description: Will remain free from falls  4/8/2021 0537 by Chacorta Masterson RN  Outcome: Ongoing  Note: Patient remains free of incidence/ injury. Bed remains in low position. Bed alarm on. Up with walker and standby. Problem: Pain:  Goal: Control of acute pain  Description: Control of acute pain  4/8/2021 0537 by Chacorta Masterson RN  Outcome: Ongoing  Note: Patient expresses relief following administration of prn pain medication.

## 2021-04-08 NOTE — PLAN OF CARE
Problem: Falls - Risk of:  Goal: Will remain free from falls  Description: Will remain free from falls  4/8/2021 1559 by Katerina Jordan RN  Outcome: Completed  4/8/2021 0537 by Farrukh Griffiths RN  Outcome: Ongoing  Note: Patient remains free of incidence/ injury. Bed remains in low position. Bed alarm on. Up with walker and standby. Goal: Absence of physical injury  Description: Absence of physical injury  Outcome: Completed     Problem: Pain:  Goal: Pain level will decrease  Description: Pain level will decrease  Outcome: Completed  Goal: Control of acute pain  Description: Control of acute pain  4/8/2021 1559 by Katerina Jordan RN  Outcome: Completed  4/8/2021 0537 by Farrukh Griffiths RN  Outcome: Ongoing  Note: Patient expresses relief following administration of prn pain medication.    Goal: Control of chronic pain  Description: Control of chronic pain  Outcome: Completed     Problem: Physical Regulation:  Goal: Complications related to the disease process, condition or treatment will be avoided or minimized  Description: Complications related to the disease process, condition or treatment will be avoided or minimized  Outcome: Completed

## 2021-04-08 NOTE — PROGRESS NOTES
FAMILY MEDICINE  - PROGRESS NOTE    Date:  4/8/2021  [place]   [id number]      Chief Complaint   Patient presents with    Fall         Interval History:  Improved, she reports some mid-sternal chest pain this am but it is improved from yesterday. She is awaiting Cardiology to round to determine if she will have a cardiac cath. No significant events overnight.       Subjective  Constitutional: positive for obesity  Eyes: positive for contacts/glasses  Ears, nose, mouth, throat, and face: negative  Respiratory: negative  Cardiovascular: positive for chest pain  Gastrointestinal: negative  Musculoskeletal:positive for arthralgias, back pain and myalgias  Neurological: positive for paresthesia  Behavioral/Psych: negative  Endocrine: positive for diabetic symptoms including neuropathy and hyperglycemia:    Objective:    BP (!) 168/78   Pulse 82   Temp 98.4 °F (36.9 °C) (Oral)   Resp 20   Ht 5' 5\" (1.651 m)   Wt 232 lb 9.4 oz (105.5 kg)   SpO2 97%   BMI 38.70 kg/m²   General appearance - alert, well appearing, and in no distress and overweight  Mental status - alert, oriented to person, place, and time  Eyes - pupils equal and reactive, extraocular eye movements intact  Ears - hearing grossly normal bilaterally  Nose - normal and patent, no erythema, discharge or polyps  Mouth - mucous membranes moist, pharynx normal without lesions  Neck - supple, no significant adenopathy  Lymphatics - no palpable lymphadenopathy, no hepatosplenomegaly  Chest - clear to auscultation, no wheezes, rales or rhonchi, symmetric air entry  Heart - normal rate, regular rhythm, normal S1, S2, no murmurs, rubs, clicks or gallops  Abdomen - soft, nontender, nondistended, no masses or organomegaly  Breasts - not examined  Back exam - not examined  Neurological - alert, oriented, normal speech, no focal findings or movement disorder noted  Musculoskeletal - no joint tenderness, deformity or swelling  Extremities - peripheral pulses normal, no pedal edema, no clubbing or cyanosis  Skin - normal coloration and turgor, no rashes, no suspicious skin lesions noted    Data:   Medications:   Current Facility-Administered Medications   Medication Dose Route Frequency Provider Last Rate Last Admin    sodium chloride flush 0.9 % injection 5-40 mL  5-40 mL Intravenous 2 times per day Verlinda Reges, DO   10 mL at 04/07/21 2128    sodium chloride flush 0.9 % injection 5-40 mL  5-40 mL Intravenous PRN Verlinda Reges, DO        0.9 % sodium chloride infusion  25 mL Intravenous PRN Verlinda Reges, DO        acetaminophen (TYLENOL) tablet 650 mg  650 mg Oral Q4H PRN Verlinda Reges, DO        oxyCODONE (ROXICODONE) immediate release tablet 5 mg  5 mg Oral Q4H PRN Verlinda Reges, DO        Or    oxyCODONE HCl (OXY-IR) immediate release tablet 10 mg  10 mg Oral Q4H PRN Verlinda Reges, DO   10 mg at 04/07/21 2121    fentaNYL (SUBLIMAZE) injection 25 mcg  25 mcg Intravenous Q1H PRN Verlinda Reges, DO        Or    fentaNYL (SUBLIMAZE) injection 50 mcg  50 mcg Intravenous Q1H PRN Verlinda Reges, DO        ondansetron (ZOFRAN) injection 4 mg  4 mg Intravenous Q8H PRN Verlinda Reges, DO        aspirin EC tablet 81 mg  81 mg Oral Daily Margret Burkett MD   81 mg at 04/07/21 1442    docusate sodium (COLACE) capsule 100 mg  100 mg Oral BID Margret Burkett MD   100 mg at 04/07/21 2106    traZODone (DESYREL) tablet 75 mg  75 mg Oral Nightly Margret Burkett MD   75 mg at 04/07/21 2106    [Held by provider] apixaban (ELIQUIS) tablet 5 mg  5 mg Oral BID Margret Burkett MD        bumetanide Germania Maxcy) tablet 2 mg  2 mg Oral Daily Margret Burkett MD   2 mg at 04/07/21 1441    atorvastatin (LIPITOR) tablet 40 mg  40 mg Oral Daily Margret Burkett MD   40 mg at 04/07/21 1441    busPIRone (BUSPAR) tablet 5 mg  5 mg Oral BID Margret Burkett MD   5 mg at 04/07/21 2106    carvedilol (COREG) tablet 6.25 mg  6.25 mg Oral BID Kong Johnson MD   6.25 mg at 04/07/21 2125    ranolazine (RANEXA) extended release tablet 500 mg  500 mg Oral BID Malini Montez MD   500 mg at 04/07/21 2105    insulin glargine (LANTUS) injection vial 15 Units  15 Units Subcutaneous TID Malini Montez MD   15 Units at 04/07/21 2124    allopurinol (ZYLOPRIM) tablet 100 mg  100 mg Oral Daily Malini Montez MD   100 mg at 04/07/21 1441    febuxostat (ULORIC) tablet 40 mg  40 mg Oral Daily Malini Montez MD   40 mg at 04/07/21 1445    gabapentin (NEURONTIN) capsule 200 mg  200 mg Oral BID Malini Montez MD   200 mg at 04/07/21 2106    glucose (GLUTOSE) 40 % oral gel 15 g  15 g Oral PRN Mailni Montez MD        dextrose 50 % IV solution  12.5 g Intravenous PRN Malini Montez MD        glucagon (rDNA) injection 1 mg  1 mg Intramuscular PRN Malini Montez MD        dextrose 5 % solution  100 mL/hr Intravenous PRN Malini Montez MD        insulin lispro (HUMALOG) injection vial 0-12 Units  0-12 Units Subcutaneous TID WC Malini Montez MD   4 Units at 04/07/21 1827    insulin lispro (HUMALOG) injection vial 0-6 Units  0-6 Units Subcutaneous Nightly Malini Montez MD   3 Units at 04/07/21 2124    sodium chloride flush 0.9 % injection 10 mL  10 mL Intravenous PRN Kong Johnson MD   10 mL at 04/07/21 0814    pantoprazole (PROTONIX) tablet 40 mg  40 mg Oral QAM AC Dat GRACE Johnson MD        insulin glargine (LANTUS) injection vial 85 Units  85 Units Subcutaneous Daily Malini Montez MD        nitroGLYCERIN (NITROSTAT) SL tablet 0.4 mg  0.4 mg Sublingual Q5 Min PRN Raleigh Dietrich MD        isosorbide mononitrate (IMDUR) extended release tablet 30 mg  30 mg Oral Daily Kong Johnson MD   30 mg at 04/07/21 2124       Intake/Output Summary (Last 24 hours) at 4/8/2021 0603  Last data filed at 4/7/2021 1721  Gross per 24 hour   Intake 325 ml   Output 1200 ml   Net -875 ml     Recent Results (from the past 24 hour(s))   POC Glucose Fingerstick    Collection Time: 04/07/21  6:13 AM   Result Value Ref Range    POC Glucose 182 (H) 65 - 105 mg/dL   EKG 12 Lead    Collection Time: 04/07/21  6:30 AM   Result Value Ref Range    Ventricular Rate 59 BPM    Atrial Rate 59 BPM    P-R Interval 168 ms    QRS Duration 102 ms    Q-T Interval 444 ms    QTc Calculation (Bazett) 439 ms    P Axis 59 degrees    R Axis 26 degrees    T Axis -66 degrees   CBC    Collection Time: 04/07/21  6:46 AM   Result Value Ref Range    WBC 8.0 3.5 - 11.0 k/uL    RBC 3.64 (L) 4.0 - 5.2 m/uL    Hemoglobin 10.7 (L) 12.0 - 16.0 g/dL    Hematocrit 33.4 (L) 36 - 46 %    MCV 91.7 80 - 100 fL    MCH 29.5 26 - 34 pg    MCHC 32.2 31 - 37 g/dL    RDW 16.4 (H) 11.5 - 14.9 %    Platelets 081 180 - 645 k/uL    MPV 8.0 6.0 - 12.0 fL    NRBC Automated NOT REPORTED per 100 WBC   Basic Metabolic Panel    Collection Time: 04/07/21  6:46 AM   Result Value Ref Range    Glucose 206 (H) 70 - 99 mg/dL    BUN 40 (H) 8 - 23 mg/dL    CREATININE 1.96 (H) 0.50 - 0.90 mg/dL    Bun/Cre Ratio NOT REPORTED 9 - 20    Calcium 9.0 8.6 - 10.4 mg/dL    Sodium 141 135 - 144 mmol/L    Potassium 3.9 3.7 - 5.3 mmol/L    Chloride 110 (H) 98 - 107 mmol/L    CO2 22 20 - 31 mmol/L    Anion Gap 9 9 - 17 mmol/L    GFR Non-African American 26 (L) >60 mL/min    GFR  31 (L) >60 mL/min    GFR Comment          GFR Staging NOT REPORTED    POC Glucose Fingerstick    Collection Time: 04/07/21 12:05 PM   Result Value Ref Range    POC Glucose 188 (H) 65 - 105 mg/dL   POC Glucose Fingerstick    Collection Time: 04/07/21  6:22 PM   Result Value Ref Range    POC Glucose 238 (H) 65 - 105 mg/dL   POC Glucose Fingerstick    Collection Time: 04/07/21  8:32 PM   Result Value Ref Range    POC Glucose 276 (H) 65 - 105 mg/dL     -----------------------------------------------------------------  RAD:  EKG:  Micro:     Assessment & Plan:    Patient Active Problem List:     Coronary artery disease Chronic diastolic heart failure (HCC)     Diabetic polyneuropathy associated with type 2 diabetes mellitus ([facility] [address]     History of coronary artery bypass graft     Iron deficiency anemia     Spinal stenosis of lumbar region with neurogenic claudication     Mixed hyperlipidemia     Stage 4 chronic kidney disease (HCC)     Type 2 diabetes mellitus with kidney complication, with long-term current use of insulin (HCC)     Syncope and collapse     Obesity, Class II, BMI 35-39.9     Thyroid nodule greater than or equal to 1 cm in diameter incidentally noted on imaging study           Plan:  -Chest pain r/o ACS - Cardiology does NOT feel that the patient requires a cardiac cath. -HTN, Chronic diastolic CHF - stable. -DM2 - stable.  -Okay to D/C home if okay with Cardiology.  -Follow up in the office with Yvan Caro CNP, next week. -Complete orders per chart.     See orders   Disposition:    Electronically signed by Mick Oscar MD on 4/8/2021 at 6:03 AM

## 2021-04-08 NOTE — PROGRESS NOTES
[facility] CARDIOLOGY Progress Note    2021 8:35 AM      Subjective:  Ms. Wild January complains of intermittent mid anterior chest pain radiating to the back while at rest which is atypical for angina pectoris. No worsening shortness of breath or palpitations or lightheadedness or dizziness. Review of systems:  No fever or chills. No cough. Nurse at bedside updated overnight events. LABS:     Recent Results (from the past 24 hour(s))   POC Glucose Fingerstick    Collection Time: 21 12:05 PM   Result Value Ref Range    POC Glucose 188 (H) 65 - 105 mg/dL   POC Glucose Fingerstick    Collection Time: 21  6:22 PM   Result Value Ref Range    POC Glucose 238 (H) 65 - 105 mg/dL   POC Glucose Fingerstick    Collection Time: 21  8:32 PM   Result Value Ref Range    POC Glucose 276 (H) 65 - 105 mg/dL   POC Glucose Fingerstick    Collection Time: 21  6:30 AM   Result Value Ref Range    POC Glucose 160 (H) 65 - 105 mg/dL       Pulse Ox:  SpO2  Av.7 %  Min: 93 %  Max: 100 %    Supplemental O2: O2 Flow Rate (L/min): 0 L/min     Current Meds:    sodium chloride flush  5-40 mL Intravenous 2 times per day    aspirin  81 mg Oral Daily    docusate sodium  100 mg Oral BID    traZODone  75 mg Oral Nightly    [Held by provider] apixaban  5 mg Oral BID    bumetanide  2 mg Oral Daily    atorvastatin  40 mg Oral Daily    busPIRone  5 mg Oral BID    carvedilol  6.25 mg Oral BID    ranolazine  500 mg Oral BID    insulin glargine  15 Units Subcutaneous TID    allopurinol  100 mg Oral Daily    febuxostat  40 mg Oral Daily    gabapentin  200 mg Oral BID    insulin lispro  0-12 Units Subcutaneous TID WC    insulin lispro  0-6 Units Subcutaneous Nightly    pantoprazole  40 mg Oral QAM AC    insulin glargine  85 Units Subcutaneous Daily    isosorbide mononitrate  30 mg Oral Daily            VITAL SIGNS:    BP (!) 100/51   Pulse 70   Temp 98.6 °F (37 °C) (Oral) Resp 18   Ht 5' 5\" (1.651 m)   Wt 232 lb 9.4 oz (105.5 kg)   SpO2 93%   BMI 38.70 kg/m²  0 L/min      Admit Weight:  225 lb (102.1 kg)    Last 3 weights: Wt Readings from Last 3 Encounters:   04/07/21 232 lb 9.4 oz (105.5 kg)   03/30/21 224 lb 12.8 oz (102 kg)   03/25/21 231 lb (104.8 kg)       BMI: Body mass index is 38.7 kg/m². INPUT/OUTPUT:          Intake/Output Summary (Last 24 hours) at 4/8/2021 0835  Last data filed at 4/7/2021 1721  Gross per 24 hour   Intake --   Output 1200 ml   Net -1200 ml         Telemetry shows  Sinus. EXAM:     General appearance: awake, alert. Pleasant. Looks comfortable resting in bed despite complaining of intermittent chest pain. Neck: No JVD or thyromegaly  Chest: clear bilaterally. No tenderness. No rhonchi or wheezing. Cardiac: Regular rate and rhythm. No significant murmur or gallop or rubs. Abdomen: soft, non-tender. Extremities: no cyanosis, no clubbing, no calf tenderness, no leg edema. Pulses: intact and symmetrical bilateral radial pulses. Sore wrist complaining of gouty arthritis. Skin:  warm and dry. Neuro:  Able to move all 4 extremities. No new focal deficits. 2 D Echocardiogram Apr 7, 2021:       Summary  Limited study ordered to compare with echo of 12/21 in [facility]. Normal left ventricle size and function with an estimated EF > 55%. No segmental wall motion abnormalities seen. Mild to moderately increased LV wall thickness  No significant change compared with previous echo. No significant pericardial effusion is seen. Nuclear stress test Apr 7, 2021:    Impression       Perfusion:  Stress perfusion defect lateral wall with minimal reperfusion. 13% of left ventricular myocardium affected.       Function:  Left ventricular ejection fraction of 47%.  Moderate hypokinesis   in the septum and inferior wall.       Risk stratification: HIGH        EKG today revealed normal sinus rhythm, LVH with ST T-wave abnormality. Abnormal EKG. No acute ST elevation. ASSESSMENT:    Recurrent syncope. Etiology unknown.       Atypical Chest pain-ruled out for myocardial infarction. Doubt angina pectoris. Doubt aortic dissection. Doubt pulmonary embolism as patient is on Eliquis therapy. Wonder if it is anxiety related or GERD or musculoskeletal etiology.     ASCAD, prior CABG x3 with LIMA to mid LAD, SVG to D1, SVG to OM3 in 2005, FADIA of RCA in 2015, all 3 grafts patent, patent RCA stent site, severe disease distal to anastomosis of SVG to OM3 not amenable to PCI on cardiac catheterization October 2019. Fixed lateral wall defect with minimal reperfusion, LVEF 47% on Nuclear stress test Apr 7, 2021.     Paroxysmal atrial fibrillation- on Eliquis therapy. Currently in sinus rhythm.       Mild mitral regurgitation.       Mild tricuspid regurgitation.       Essential hypertension.       Hyperlipidemia.       Diabetes mellitus.       Chronic kidney disease stage 4. Creatinine 2.0 range. History of gout with associated pain.     History of anxiety disorder. Uses a walker.     History of COVID-19 positive test February 23, 2021. Was hospitalized and discharged from [facility] on 03/03/2021.     Other problems as charted. REC/PLAN:      As ordered. Discussed with patient in detail regarding abnormal nuclear stress test results and I suspect the fixed lateral wall defect is due to known severe disease involving circumflex artery including native coronary artery disease following patent SVG to OM3 which is not amenable for PCI on last cardiac catheterization as outlined above. Suggest continued medical therapy only and no plans for cardiac catheterization at this time, given low suspicion for any new critical revascularizable coronary artery disease and especially given chronic kidney disease stage 3 to 4, chronic Eliquis therapy and comorbid conditions.     Will decrease Bumex to 1 mg daily, add Imdur 30 mg daily, nitroglycerin 0.4 mg sublingual p.r.n. chest pain, continue on Ranexa, Coreg, aspirin, Eliquis, atorvastatin, other supportive care. Further treatment of her gout and noncardiac issues per PCP. Reassured patient regarding no acute EKG changes. Unable to perform CT angiogram of chest with contrast to rule out any pulmonary embolism or aortic dissection given elevated creatinine and low GFR, per radiology department, after we ordered originally this a.m..    Okay to discharge patient on all current cardiac medications and advised to keep her already scheduled appointment with Dr. Josh Shipley, primary cardiologist next week and for further management on an outpatient basis regarding history of syncope. Overall guarded prognosis and increased risk for recurrent hospitalizations and recurrent falls and cautioned patient against falls especially while on Eliquis with resultant increased risk for bleeding etc..  Patient voices understanding. Discussed with the nurse at bedside. No family members available to discuss. Signing off. Thank you. Electronically signed by Ashley Sofia MD, [facility]        PLEASE NOTE:  This progress note was completed using a voice transcription system. Every effort was made to ensure accuracy. However, inadvertent computerized transcription errors may be present.

## 2021-04-08 NOTE — PROGRESS NOTES
I dont believe we can do hospital follow up again, was just in for that? If not able to, keep apt as planned as she does have follow up with cardiology next week. Offer sooner appt if needed.

## 2021-04-08 NOTE — PROGRESS NOTES
Patient seen and examined. Afebrile, VSS. Patient resting comfortably in bed. Having some low back pain this morning as well as right MCP pain from gout flare. No abdominal pain. Abdomen soft, non-tender, non-distended. No new general surgery issues. Stress tests results noted; patient high risk. Possible catheterization per cardiology. Continue medical management. No acute general surgical issues. Abnormal stress test. Possible heart catheterization today.     Jamshid Urias PA-C  [address] [facility] Surgery

## 2021-04-09 NOTE — PROGRESS NOTES
Physician Progress Note      PATIENT:               Refugio De Santiago  CSN #:                  [id number]  :                       1960  ADMIT DATE:       2021 6:52 PM  Lu Tyson DATE:        2021 4:02 PM  RESPONDING  PROVIDER #:        Mingo Rowell MD          QUERY TEXT:    Patient admitted with Syncope. Pt noted to have DM polyneuropathy and is on   Neurontin as a home med. If possible, please document below and  in progress   notes and discharge summary if you are evaluating and/or treating any of the   following: The medical record reflects the following:  Risk Factors: DM polyneuropathy  Clinical Indicators: noted to have DM polyneuropathy and is on Neurontin as a   home med  Treatment: Neurontin    Thank you. Please call if you have any questions. P) [phone]. Signed   by Sury Blanca RN Clinical , CRCR  Options provided:  -- Syncope due to DM autonomic neuropathy  -- Syncope is not related to due to DM autonomic neuropathy  -- Other - I will add my own diagnosis  -- Disagree - Not applicable / Not valid  -- Disagree - Clinically unable to determine / Unknown  -- Refer to Clinical Documentation Reviewer    PROVIDER RESPONSE TEXT:    The syncope is due to DM autonomic neuropathy.     Query created by: Lindsay Valadez on 2021 6:06 PM      Electronically signed by:  Mingo Rowell MD 2021 11:36 PM

## 2021-04-10 NOTE — DISCHARGE SUMMARY
[facility] Discharge Summary      Patient ID: Montserrat Wheatley    MRN: [id number]     Acct:  [de-identified]       Patient's PCP: AFSANEH Alvarez CNP    Admit Date: 4/6/2021     Discharge Date: 4/8/2021      Admitting Physician: Oliver Love MD    Discharge Physician: Oliver Love MD     Discharge Diagnoses:    Primary Problem  Syncope and collapse    Active Hospital Problems    Diagnosis Date Noted    Obesity, Class II, BMI 35-39.9 [E66.9] 04/07/2021    Thyroid nodule greater than or equal to 1 cm in diameter incidentally noted on imaging study [E04.1]     Syncope and collapse [R55] 04/06/2021    History of coronary artery bypass graft [Z95.1] 03/31/2021    Diabetic polyneuropathy associated with type 2 diabetes mellitus ([address] [E11.42] 02/17/2020    Stage 4 chronic kidney disease ([address] [N18.4] 08/06/2019    Chronic diastolic heart failure ([address] [I50.32] 09/20/2018    Type 2 diabetes mellitus with kidney complication, with long-term current use of insulin ([address] [E11.29, Z79.4] 09/20/2018    Spinal stenosis of lumbar region with neurogenic claudication [M48.062] 12/27/2016    Mixed hyperlipidemia [E78.2] 07/27/2016    Coronary artery disease [I25.10] 05/04/2015     Past Medical History:   Diagnosis Date    Backache, unspecified     CHF (congestive heart failure) ([address]     Coronary atherosclerosis of artery bypass graft     Cramp of limb     Gallstones     Hypertension     Insomnia     Pneumonia     Type II or unspecified type diabetes mellitus with renal manifestations, not stated as uncontrolled(250.40)     Type II or unspecified type diabetes mellitus without mention of complication, not stated as uncontrolled     Unspecified vitamin D deficiency      The patient was seen and examined on day of discharge and this discharge summary is in conjunction with any daily progress note from day of discharge.     Code Status:  Prior    Hospital Course: uncomplicated    Consults: cardiology and general surgery    Significant Diagnostic Studies: as above, and as follows: see chart    Treatments: as above    Disposition: home    Discharged Condition: Stable    Follow Up:  AFSANEH Bruno CNP in one week    Discharge Medications:    Shirley Valle   Home Medication Instructions WRA:[id number]    Printed on:04/10/21 1112   Medication Information                      allopurinol (ZYLOPRIM) 100 MG tablet  Take 1 tablet by mouth daily             apixaban (ELIQUIS) 5 MG TABS tablet  Take 1 tablet by mouth 2 times daily             aspirin 81 MG tablet  Take 81 mg by mouth daily. atorvastatin (LIPITOR) 40 MG tablet  Take 1 tablet by mouth daily             bumetanide (BUMEX) 2 MG tablet  Take 1 tablet by mouth daily             busPIRone (BUSPAR) 5 MG tablet  Take 1 tablet by mouth 2 times daily             carvedilol (COREG) 6.25 MG tablet  Take 1 tablet by mouth 2 times daily             docusate sodium (COLACE) 100 MG capsule  Take 100 mg by mouth 2 times daily. febuxostat (ULORIC) 40 MG TABS tablet  Take 1 tablet by mouth daily             gabapentin (NEURONTIN) 100 MG capsule  Take 2 capsules by mouth 2 times daily for 30 days. glucose blood VI test strips (DEN CONTOUR TEST) strip  1 each by In Vitro route 3 times daily. As needed. Insulin Degludec (TRESIBA FLEXTOUCH) 100 UNIT/ML SOPN  Inject 85 Units into the skin daily             insulin glargine (LANTUS SOLOSTAR) 100 UNIT/ML injection pen  Inject 15 Units into the skin three times daily             Insulin Pen Needle (BD ULTRA-FINE PEN NEEDLES) 29G X 12.7MM MISC  1 each by Does not apply route 6 times daily. For use with each insulin             isosorbide mononitrate (IMDUR) 30 MG extended release tablet  Take 1 tablet by mouth daily             Lancets 28G MISC  Inject 1 each into the skin 3 times daily.              Liraglutide (VICTOZA) 18 MG/3ML SOPN SC injection  Inject 1.8 mg into the skin daily             nitroGLYCERIN (NITROSTAT) 0.4 MG SL tablet  Place 1 tablet under the tongue every 5 minutes as needed for Chest pain. pantoprazole (PROTONIX) 40 MG tablet  Take 1 tablet by mouth every morning (before breakfast)             ranolazine (RANEXA) 500 MG extended release tablet  Take 1 tablet by mouth 2 times daily             traZODone (DESYREL) 50 MG tablet  Take 75 mg by mouth nightly                  Activity: activity as tolerated    Diet: diabetic diet    Time Spent on discharge is more than 35 minutes in the examination, evaluation, counseling and review of medications and discharge plan.       Electronically signed by Deborah Franco MD on 4/10/2021 at 11:12 AM

## 2021-05-03 PROBLEM — N18.4 ANEMIA IN STAGE 4 CHRONIC KIDNEY DISEASE (HCC): Status: ACTIVE | Noted: 2021-05-03

## 2021-05-03 PROBLEM — I10 ESSENTIAL HYPERTENSION: Status: ACTIVE | Noted: 2021-05-03

## 2021-05-03 PROBLEM — D63.1 ANEMIA IN STAGE 4 CHRONIC KIDNEY DISEASE (HCC): Status: ACTIVE | Noted: 2021-05-03

## 2021-05-13 ENCOUNTER — HOSPITAL ENCOUNTER (OUTPATIENT)
Dept: PHYSICAL THERAPY | Age: 61
Setting detail: THERAPIES SERIES
Discharge: HOME | End: 2021-05-13
Payer: COMMERCIAL

## 2021-05-13 PROCEDURE — 97110 THERAPEUTIC EXERCISES: CPT

## 2021-05-13 PROCEDURE — 97112 NEUROMUSCULAR REEDUCATION: CPT

## 2021-05-13 PROCEDURE — 97162 PT EVAL MOD COMPLEX 30 MIN: CPT

## 2021-05-13 NOTE — PROGRESS NOTES
[facility]  [facility]. [address]         Phone: [phone]       Fax: [phone]     Physical Therapy Spine Evaluation    Date:  2021  Patient: Roberth Goff  : 1960  MRN: [id number]  Physician:Shellie Payne CNP     Insurance: Medical [place]   Medical Diagnosis: Right /left leg pain   Rehab Codes: M79.604,M79.605, M25.532  Onset Date:  11/15/2018 Next 's appt.: 21  Visit Count:   Cancel/No Show: 0/0    Subjective: The patient with a 3-4 year history of lower back and (B) leg pains. Difficulty walking.    CC:(B) LE pain   HPI: (11-15-18)    PMHx: [] Unremarkable [x] Diabetes [x] HTN  [x] Pacemaker   [x] MI/Heart Problems [] Cancer [x] Arthritis [] Other:              [x] Refer to full medical chart  In EPIC     Comorbidities:   [x] Obesity [] Dialysis  [] Other:   [x] Asthma/COPD [] Dementia [] Other:   [] Stroke [] Sleep apnea [] Other:   [x] Vascular disease [] Rheumatic disease [] Other:     Tests: [x] X-Ray: [x] MRI:  [] Other:    Medications: [x] Refer to full medical record [] None [] Other:  Allergies:      [x] Refer to full medical record [] None [] Other:    Function:  Hand Dominance  [x] Right  [] Left  Working:  [] Normal Duty  [] Light Duty  [] Off D/T Condition  [] Retired  [] Not Employed                  [x]  Disability  [] Other:           Return to work:   Job/ADL Description:    ADL/IADL Previous level of function Current level of function Who currently assists the patient with task   Bathing  [x] Independent  [] Assist [x] Independent  [] Assist    Dress/grooming [x] Independent  [] Assist [x] Independent  [] Assist    Transfer/mobility [x] Independent  [] Assist [x] Independent  [] Assist    Feeding [x] Independent  [] Assist [x] Independent  [] Assist    Toileting [x] Independent  [] Assist [x] Independent  [] Assist    Driving [x] Independent  [] Assist [x] Independent  [] Assist    Housekeeping [x] Independent  [] Assist [x] Independent  [] Assist    Grocery shop/meal prep [x] Independent  [] Assist [x] Independent  [] Assist      Gait Prior level of function Current level of function    [x] Independent  [] Assist [x] Independent  [] Assist   Device: [x] Independent [x] Independent    [] Straight Cane [] Quad cane [] Straight Cane [] Quad cane    [] Standard walker [x] Rolling walker   [] 4 wheeled walker [] Standard walker [] Rolling walker   [x] 4 wheeled walker    [] Wheelchair [] Wheelchair     Pain:  [] Yes  [] No Location: (B) legs  Pain Rating: (0-10 scale) 7/10  Pain altered Tx:  [x] Yes  [] No  Action:    Symptoms:  [] Improving [x] Worsening [] Same  Better:  [x] AM    [] PM    [x] Sit    [] Rise/Sit    []Stand    [] Walk    [x] Lying    [] Other:  Worse: [] AM    [x] PM    [] Sit    [x] Rise/Sit    [x]Stand    [x] Walk    [] Lying    [x] Bend                             [x] Valsalva    [] Other:  Sleep: [] OK    [x] Disturbed    Objective:      STRENGTH  STRENGTH  ROM    Left Right  Left Right Cervical    C5 Shld Abd   L1-2 Hip Flex 3/5 3/5 Flexion    Shld Flexion   Hip Abd 3/5 3/5 Extension    Shld IR   L3-4 Knee Ext 3/5 3/5 Rotation L R   Shld ER   L4 Ankle DF 3/5 3/5 Sidebend L R   C6 Elb Flex   L5 EHL 3/5 3/5  Retraction    C7 Elb Ext   S1 Plant. Flex 3/5 3/5 Lumbar    C8 EPL   Abdominals   Flexion 100% loss   T1 Fing Abd   Erector Spinae 2/5 2/5 Extension 10% loss         Rotation L  R         Sidebend L R         UE/LE                                                                  TESTS (+/-) LEFT RIGHT Not Tested   SLR [x] sit [x] supine  (+) []   Hamstring (SLR)  (+) []   SKTC  (+) []   DKTC  (+) []   Slump/Dural  (+) []   SI JT   []   NATIVIDAD   []   Joint Mobility   []   Cerv. Comp   []   Cerv. Distraction   []   Cerv. Alar/Transverse   []   Vertebral Artery   []   Adsons   []   Ma Searing   []   Ray Tests ? Pain ?  Pain No Change Not Tested   RFIS [] [] [] [x]   DORETHA [] [] [] [x]   RFIL [] [x] [] []   REIL [] [] [] [x]   Rep Prot [] [] [] [x]   Rep Retract [] [] [] [x]       OBSERVATION No Deficit Deficit Not Tested Comments   Posture       Forward Head [] [x] []    Rounded Shoulders [] [x] []    Kyphosis [] [x] []    Lordosis [] [x] []    Lateral Shift [] [] []    Scoliosis [] [] []    Iliac Crest [] [] []    PSIS [] [] []    ASIS [] [] []    Genu Valgus [] [] []    Genu Varus [] [] []    Genu Recurvatum [] [] []    Pronation [] [] []    Supination [] [] []    Leg Length Discrp [] [] []    Slumped Sitting [] [x] []    Palpation [] [x] []    Sensation [] [x] [] Both feet    Edema [] [x] [] Both legs   Neurological [] [x] [] Both feet        FUNCTION Normal Difficult Unable   Sitting [x] [] []   Standing [] [x] []   Ambulation [] [x] []   Groom/Dress [] [x] []   Lift/Carry [] [x] []   Stairs [] [x] []   Bending [] [x] []   OH reach [] [x] []   Sit to Stand [] [x] []     Comments:    Assessment: Patient would continue to benefit from skilled physical therapy services in order to: lesson her lower back  Pain and restore same function of her lumbar spine. Problems:    [x] ? Back Pain:     [x] ? ROM:     [x] ? Strength:   [x] ? Function:  [x] Postural Deviations  [x] Gait Deviations      STG: (to be met in 6 treatments)  1. ? Pain: Lesson pain to 2/10.   2. ? ROM: lumbar AAROM into extension 0 % loss. 3. ? Strength: increase strength into extension to 3/5.    4. ? Function: OPTIMAL score of 9/3.   5. Independent with Home Exercise Programs  6. Demonstrate Knowledge of fall prevention  LTG: (to be met in 12 treatments)  1. OPTIMAL score at the time of discharge of 6/3.         2. Patient goals:Feel better.      Functional Assessment Used: OPTIMAL   Current Status Score:12/3  Goal Status Sore: 6/3  Evaluation Complexity:  History (Personal factors, co morbidities) [] 0 [] 1-2 [x] 3+   Exam (limitations, restrictions) [] 1-2 [x] 3 [] 4+   Clinical presentation (progression) [] Stable [x] Evolving  [] Unstable   Decision Making [] Low [x] Moderate [] High    [] Low Complexity [x] Moderate Complexity [] High Complexity       Rehab Potential:  [] Good  [x] Fair  [] Poor   Suggested Professional Referral:  [x] No  [] Yes:  Barriers to Goal Achievement[de-identified]  [x] No  [] Yes:  Domestic Concerns:  [x] No  [] Yes:    Pt. Education:  [x] Plans/Goals, Risks/Benefits discussed  [x] Home exercise program  Method of Education: [x] Verbal  [x] Demo  [x] Written  Comprehension of Education:  [] Verbalizes understanding. [] Demonstrates understanding. [x] Needs Review. [] Demonstrates/verbalizes understanding of HEP/Ed previously given.     Treatment Plan:  [x] Therapeutic Exercise   39266  [] Iontophoresis: 4 mg/mL Dexamethasone Sodium Phosphate  mAmin  19683   [] Therapeutic Activity  57028 [] Vasopneumatic cold with compression  62914    [] Gait Training   60270 [] Ultrasound   14385   [x] Neuromuscular Re-education  23115 [x] Electrical Stimulation Unattended  78204   [x] Manual Therapy  08250 [] Electrical Stimulation Attended  33189   [x] Instruction in HEP  [] Lumbar/Cervical Traction  16177   [x] Aquatic Therapy   82513 [x] Cold/hot pack    [] Massage   77904      [] Dry Needling, 1 or 2 muscles  30477   [] Biofeedback, first 15 minutes   09274  [] Biofeedback, additional 15 minutes   07283 [] Dry Needling, 3 or more muscles  37944       Frequency:  2 x/week for 12 visits    Todays Treatment:  Modalities: Exercise,   Precautions:falls  Exercises:  Exercise  (PREP) Reps/ Time Weight/ Level Comments   LTR/SKTC 10 x 1   1 - 2 x daily                            Other:    Specific Instructions for next treatment: PREP     Treatment Charges: Mins Units   [x] Evaluation       []  Low       [x]  Moderate       []  High 30 1   []  Modalities     [x]  Ther Exercise 15 1   []  Manual Therapy     []  Ther Activities     []  Aquatics     [x]  Neuromuscular 15 1   []  Gait Training     []  Dry Needling           1-2 muscles []  Dry Needling           3 or more muscles     [] Vasocompression     []  Other       TOTAL TREATMENT TIME: 60 min     Time in: 2:15 pm    Time out: 3:15 pm     Electronically signed by: Joycelyn Leblanc PT

## 2021-05-18 ENCOUNTER — HOSPITAL ENCOUNTER (OUTPATIENT)
Dept: PHYSICAL THERAPY | Age: 61
Setting detail: THERAPIES SERIES
Discharge: HOME | End: 2021-05-18
Payer: COMMERCIAL

## 2021-05-18 PROCEDURE — 97113 AQUATIC THERAPY/EXERCISES: CPT

## 2021-05-18 NOTE — FLOWSHEET NOTE
[facility] Outpatient Physical Therapy   [address]   Phone: [phone]   Fax: [phone]    Physical Therapy Daily Treatment Note      Date:  2021  Patient Name:  Emelina Painting    :  1960  MRN: [id number]  Physician:Shellie Payne CNP                                  Insurance: Medical [place]   Medical Diagnosis: Right /left leg pain              Rehab Codes: M79.604,M79.605, M25.532  Onset Date:  11/15/2018    Next DruLzma's appt.: 21  Visit Count: 2                Cancel/No Show: 0/0    Subjective:  Patient states pain is constant in back and LE's. Per patient currently on 31 day cardiac monitor at home  Pain:  [x] Yes  [] No Location: Left lower back near incision with N/T and burning down B LE's L>R Pain Rating: (0-10 scale) 7/10  Pain altered Tx:  [x] No  [] Yes  Action:  Comments: Initiated aquatic therapy with emphasis on core stability and postural awareness. Patient educated on benefits of aquatics and encouraged to avoid increasing pain. Reviewed attendance policy. Patient late this date due to Dr appointment in building prior to PT  Objective:  Modalities:   Precautions:  Exercises:      [address] [facility] J Exercise Log  Aquatic, Hip & DLS Program- Phase 1    Date of Eval:                                Primary PT: Marcus Cox PT  Diagnosis: (B) LE pain; R Wrist Pain  Things to Focus On (goals):   Surgical Precautions: Dec. 2020 Lumbar Fusion; 2020 Thoracic Fusion  Medical Precautions: DM  [] C-9 dates  [] Occ Med   [] Medicare       Date 21       Visit # 2/12       Walk F/L/R 2 Laps @ Rail       Marching 10X       Squats 10x3\"       Step-Ups F/L        Step Down F/L        Heel-toe raises 10X       SLR F/L/R 10x       Hip/Knee Flex/Ext        F/L Lunges                Kickboard Ex.  Small       Iso Abd. 10x5\"       Push-pull 10x       Paddling                UE Format:        Horiz Abd/Add        IR/ER (wipers) Alt Flex/Ext        Alt Press Down        Abd/Add                Deep Water: 1 Noodle       Hang 5'       Cycling        MARTTILA        X-Country                Balance        SLS                Stretches  Add      Achllies        Hamstring                Cool Down 2 Laps       Pain Rating 7           Specific Instructions for next treatment: Assess response and progress as symptoms allow    Assessment: [x] Progressing toward goals. Edema noted in (B) LE's L>R prior to entering pool. Emphasis on technique throughout program. Patient with sharp pain in right lower back after walking and heel toe raises; pain subsided quickly and patient able to complete program. All exercise completed at 30% WB in aquatic environement. Finished with deep water hang to unload spine- patient noted great pain relief in back and LE's.    [] No change. [] Other:    [x] Patient would continue to benefit from skilled physical therapy services in order to: lessen her lower back pain and restore function of her lumbar spine. STG: (to be met in 6 treatments)  1. ? Pain:  2. ? ROM:  3. ? Strength:  4. ? Function:  5. Independent with Home Exercise Programs  6. Demonstrate Knowledge of fall prevention  LTG: (to be met in 12 treatments)  1.         2. Patient goals:Feel better. Pt. Education:  [x] Yes  [] No  [x] Reviewed Prior HEP/Ed  Method of Education: [x] Verbal  [x] Demo  [] Written  Comprehension of Education:  [x] Verbalizes understanding. [x] Demonstrates understanding. [] Needs review. [] Demonstrates/verbalizes HEP/Ed previously given. Plan: [x] Continue per plan of care.    [] Other:      Treatment Charges: Mins Units   []  Modalities     []  Ther Exercise     []  Manual Therapy     []  Ther Activities     [x]  Aquatics 29 2   []  Neuromuscular     [] Vasocompression     [] Gait Training     [] Dry needling        [] 1 or 2 muscles        [] 3 or more muscles     []  Other     Total Treatment time 29 2     Time

## 2021-05-19 ENCOUNTER — HOSPITAL ENCOUNTER (OUTPATIENT)
Dept: PHYSICAL THERAPY | Age: 61
Setting detail: THERAPIES SERIES
Discharge: HOME | End: 2021-05-19
Payer: COMMERCIAL

## 2021-05-19 PROCEDURE — 97113 AQUATIC THERAPY/EXERCISES: CPT

## 2021-05-19 NOTE — FLOWSHEET NOTE
Paddling                UE Format:        Horiz Abd/Add        IR/ER (wipers)        Alt Flex/Ext        Alt Press Down        Abd/Add                Deep Water: 1 Noodle 1 Noodle      Hang 5' 5'      Cycling  1'      Jacks        X-Country                Balance        SLS                Stretches   Add     Achllies        Hamstring                Cool Down 2 Laps 2 Laps      Pain Rating 7 7          Specific Instructions for next treatment: Add stretches and progress reps/resistance to tolerance    Assessment: [x] Progressing toward goals. Increased pain with R lower back during R LE open chain exercise- slower moving this date/guarded due to pain. Cues during program for technique and to keep on task. Patient encouraged to keep count and not \"over do\" exercise along with avoiding increasing pain. Added retro ambulation and hip/knee flex/ext; also progressed with deep water exercise due to good tolerance unloaded. Upon exiting pool patient experienced increased lower back pain and R buttock pain- patient again encouraged to exit pool slowly. Patient sat deck side for a few minutes- pain seemed to ease some as she exited deck     [] No change. [] Other:    [x] Patient would continue to benefit from skilled physical therapy services in order to: lessen her lower back pain and restore function of her lumbar spine. STG: (to be met in 6 treatments)  1. ? Pain:  2. ? ROM:  3. ? Strength:  4. ? Function:  5. Independent with Home Exercise Programs  6. Demonstrate Knowledge of fall prevention  LTG: (to be met in 12 treatments)  1.         2. Patient goals:Feel better. Pt. Education:  [x] Yes  [] No  [x] Reviewed Prior HEP/Ed  Method of Education: [x] Verbal  [x] Demo  [] Written  Comprehension of Education:  [x] Verbalizes understanding. [x] Demonstrates understanding. [] Needs review. [] Demonstrates/verbalizes HEP/Ed previously given. Plan: [x] Continue per plan of care.    [] Other:      Treatment Charges: Mins Units   []  Modalities     []  Ther Exercise     []  Manual Therapy     []  Ther Activities     [x]  Aquatics 35 2   []  Neuromuscular     [] Vasocompression     [] Gait Training     [] Dry needling        [] 1 or 2 muscles        [] 3 or more muscles     []  Other     Total Treatment time 35 2     Time In: 420 PM        Time Out: 500  PM    Electronically signed by:  Brenda Montoya PTA

## 2021-05-24 ENCOUNTER — HOSPITAL ENCOUNTER (OUTPATIENT)
Dept: PHYSICAL THERAPY | Age: 61
Setting detail: THERAPIES SERIES
Discharge: HOME | End: 2021-05-24
Payer: COMMERCIAL

## 2021-05-24 PROCEDURE — 97113 AQUATIC THERAPY/EXERCISES: CPT

## 2021-05-24 NOTE — FLOWSHEET NOTE
[facility] Outpatient Physical Therapy   [address]   Phone: [phone]   Fax: [phone]    Physical Therapy Daily Treatment Note      Date:  2021  Patient Name:  Michael Torres    :  1960  MRN: [id number]  Physician:Shellie Payne CNP                                  Insurance: Medical [place]   Medical Diagnosis: Right /left leg pain              Rehab Codes: M79.604,M79.605, M25.532  Onset Date:  11/15/2018    Next 's appt.: 21  Visit Count:                 Cancel/No Show: 0/0    Subjective:  Patient reports she was sore after last visit into the evening but is eager to keep moving  Pain:  [x] Yes  [] No Location: Left lower back near incision; denies LE symptoms Pain Rating: (0-10 scale) 4-5/10  Pain altered Tx:  [x] No  [] Yes  Action:  Comments: emphasis on core stability and postural awareness. Objective:  Modalities:   Precautions:  Exercises:      [facility] J Exercise Log  Aquatic, Hip & DLS Program- Phase 1    Date of Eval:                                Primary PT: Dotty Buenrostro PT  Diagnosis: (B) LE pain; R Wrist Pain  Things to Focus On (goals):   Surgical Precautions: Dec. 2020 Lumbar Fusion; 2020 Thoracic Fusion  Medical Precautions: DM  [] C-9 dates  [] Occ Med   [] Medicare       Date 21     Visit # 2/12 3/12 4/12     Walk F/L/R 2 Laps @ Rail 2 Laps + R @ Rail 2 Laps @ Rail     Marching 10X 10x 10x Add lap    Squats 10x3\" 10x5\" 10x5\"     Step-Ups F/L        Step Down F/L        Heel-toe raises 10X 10x 10x     SLR F/L/R 10x 10x 10x     Hip/Knee Flex/Ext  10x 10x     F/L Lunges                Kickboard Ex.  Small Small Small     Iso Abd. 10x5\" 10x5\" 10x5\"     Push-pull 10x 10x 10x     Paddling                UE Format:        Horiz Abd/Add   10x     IR/ER (wipers)        Alt Flex/Ext   10x     Alt Press Down        Abd/Add   10x             Deep Water: 1 Noodle 1 Noodle 1 Noodle Hang 5' 5' 3'     Cycling  1' 1'     Jacks    Add    X-Country    Add            Balance        SLS                Stretches        Achllies   2x20\"     Hamstring                Cool Down 2 Laps 2 Laps 2 Laps     Pain Rating 7 7 4-5         Specific Instructions for next treatment: Add marching lap and progress deep water activity    Assessment: [x] Progressing toward goals. Completed program to tolerance; added UE format to challenge core along with LE stretches to promote flexibility. Overall patient tolerated well without increased pain complaints. [] Other:    [x] Patient would continue to benefit from skilled physical therapy services in order to: lessen her lower back pain and restore function of her lumbar spine. STG: (to be met in 6 treatments)  1. ? Pain:  2. ? ROM:  3. ? Strength:  4. ? Function:  5. Independent with Home Exercise Programs  6. Demonstrate Knowledge of fall prevention  LTG: (to be met in 12 treatments)  1.         2. Patient goals:Feel better. Pt. Education:  [x] Yes  [] No  [x] Reviewed Prior HEP/Ed  Method of Education: [x] Verbal  [x] Demo  [] Written  Comprehension of Education:  [x] Verbalizes understanding. [x] Demonstrates understanding. [] Needs review. [] Demonstrates/verbalizes HEP/Ed previously given. Plan: [x] Continue per plan of care.    [] Other:      Treatment Charges: Mins Units   []  Modalities     []  Ther Exercise     []  Manual Therapy     []  Ther Activities     [x]  Aquatics 35 2   []  Neuromuscular     [] Vasocompression     [] Gait Training     [] Dry needling        [] 1 or 2 muscles        [] 3 or more muscles     []  Other     Total Treatment time 35 2     Time In: 315 PM        Time Out: 622  PM    Electronically signed by:  Natalya Rowe PTA

## 2021-05-26 ENCOUNTER — HOSPITAL ENCOUNTER (EMERGENCY)
Age: 61
Discharge: HOME | End: 2021-05-26
Attending: EMERGENCY MEDICINE
Payer: COMMERCIAL

## 2021-05-26 ENCOUNTER — APPOINTMENT (OUTPATIENT)
Dept: GENERAL RADIOLOGY | Age: 61
End: 2021-05-26
Payer: COMMERCIAL

## 2021-05-26 ENCOUNTER — HOSPITAL ENCOUNTER (OUTPATIENT)
Dept: PHYSICAL THERAPY | Age: 61
Setting detail: THERAPIES SERIES
End: 2021-05-26
Payer: COMMERCIAL

## 2021-05-26 VITALS
DIASTOLIC BLOOD PRESSURE: 64 MMHG | TEMPERATURE: 98.6 F | SYSTOLIC BLOOD PRESSURE: 152 MMHG | RESPIRATION RATE: 16 BRPM | OXYGEN SATURATION: 95 % | HEART RATE: 61 BPM

## 2021-05-26 DIAGNOSIS — R60.0 LEG EDEMA: Primary | ICD-10-CM

## 2021-05-26 LAB
ABSOLUTE EOS #: 0.2 K/UL (ref 0–0.4)
ABSOLUTE IMMATURE GRANULOCYTE: ABNORMAL K/UL (ref 0–0.3)
ABSOLUTE LYMPH #: 1.2 K/UL (ref 1–4.8)
ABSOLUTE MONO #: 0.4 K/UL (ref 0.1–1.3)
ALBUMIN SERPL-MCNC: 3.5 G/DL (ref 3.5–5.2)
ALBUMIN/GLOBULIN RATIO: ABNORMAL (ref 1–2.5)
ALP BLD-CCNC: 128 U/L (ref 35–104)
ALT SERPL-CCNC: 11 U/L (ref 5–33)
ANION GAP SERPL CALCULATED.3IONS-SCNC: 11 MMOL/L (ref 9–17)
AST SERPL-CCNC: 16 U/L
BASOPHILS # BLD: 1 % (ref 0–2)
BASOPHILS ABSOLUTE: 0.1 K/UL (ref 0–0.2)
BILIRUB SERPL-MCNC: 0.39 MG/DL (ref 0.3–1.2)
BNP INTERPRETATION: ABNORMAL
BUN BLDV-MCNC: 22 MG/DL (ref 8–23)
BUN/CREAT BLD: ABNORMAL (ref 9–20)
CALCIUM SERPL-MCNC: 9 MG/DL (ref 8.6–10.4)
CHLORIDE BLD-SCNC: 106 MMOL/L (ref 98–107)
CO2: 22 MMOL/L (ref 20–31)
CREAT SERPL-MCNC: 1.75 MG/DL (ref 0.5–0.9)
DIFFERENTIAL TYPE: ABNORMAL
EOSINOPHILS RELATIVE PERCENT: 3 % (ref 0–4)
GFR AFRICAN AMERICAN: 36 ML/MIN
GFR NON-AFRICAN AMERICAN: 29 ML/MIN
GFR SERPL CREATININE-BSD FRML MDRD: ABNORMAL ML/MIN/{1.73_M2}
GFR SERPL CREATININE-BSD FRML MDRD: ABNORMAL ML/MIN/{1.73_M2}
GLUCOSE BLD-MCNC: 307 MG/DL (ref 70–99)
HCT VFR BLD CALC: 33.5 % (ref 36–46)
HEMOGLOBIN: 11.1 G/DL (ref 12–16)
IMMATURE GRANULOCYTES: ABNORMAL %
LYMPHOCYTES # BLD: 17 % (ref 24–44)
MCH RBC QN AUTO: 30.2 PG (ref 26–34)
MCHC RBC AUTO-ENTMCNC: 33 G/DL (ref 31–37)
MCV RBC AUTO: 91.5 FL (ref 80–100)
MONOCYTES # BLD: 5 % (ref 1–7)
NRBC AUTOMATED: ABNORMAL PER 100 WBC
PDW BLD-RTO: 17.8 % (ref 11.5–14.9)
PLATELET # BLD: 227 K/UL (ref 150–450)
PLATELET ESTIMATE: ABNORMAL
PMV BLD AUTO: 8.2 FL (ref 6–12)
POTASSIUM SERPL-SCNC: 4.1 MMOL/L (ref 3.7–5.3)
PRO-BNP: 444 PG/ML
RBC # BLD: 3.67 M/UL (ref 4–5.2)
RBC # BLD: ABNORMAL 10*6/UL
SEG NEUTROPHILS: 74 % (ref 36–66)
SEGMENTED NEUTROPHILS ABSOLUTE COUNT: 5.3 K/UL (ref 1.3–9.1)
SODIUM BLD-SCNC: 139 MMOL/L (ref 135–144)
TOTAL PROTEIN: 6.7 G/DL (ref 6.4–8.3)
TROPONIN INTERP: ABNORMAL
TROPONIN INTERP: ABNORMAL
TROPONIN T: ABNORMAL NG/ML
TROPONIN T: ABNORMAL NG/ML
TROPONIN, HIGH SENSITIVITY: 37 NG/L (ref 0–14)
TROPONIN, HIGH SENSITIVITY: 40 NG/L (ref 0–14)
WBC # BLD: 7.1 K/UL (ref 3.5–11)
WBC # BLD: ABNORMAL 10*3/UL

## 2021-05-26 PROCEDURE — 99284 EMERGENCY DEPT VISIT MOD MDM: CPT

## 2021-05-26 PROCEDURE — 93005 ELECTROCARDIOGRAM TRACING: CPT | Performed by: EMERGENCY MEDICINE

## 2021-05-26 PROCEDURE — 85025 COMPLETE CBC W/AUTO DIFF WBC: CPT

## 2021-05-26 PROCEDURE — 84484 ASSAY OF TROPONIN QUANT: CPT

## 2021-05-26 PROCEDURE — 71045 X-RAY EXAM CHEST 1 VIEW: CPT

## 2021-05-26 PROCEDURE — 36415 COLL VENOUS BLD VENIPUNCTURE: CPT

## 2021-05-26 PROCEDURE — 83880 ASSAY OF NATRIURETIC PEPTIDE: CPT

## 2021-05-26 PROCEDURE — 80053 COMPREHEN METABOLIC PANEL: CPT

## 2021-05-26 ASSESSMENT — ENCOUNTER SYMPTOMS
VOMITING: 0
BACK PAIN: 0
NAUSEA: 0
SHORTNESS OF BREATH: 1

## 2021-05-26 ASSESSMENT — PAIN SCALES - GENERAL: PAINLEVEL_OUTOF10: 5

## 2021-05-26 NOTE — FLOWSHEET NOTE
[] [address]  [place]: [phone]   F: [phone]     Physical Therapy Cancel/No Show note    Date: 2021  Patient: Eugenia Main  : 1960  MRN: [id number]    Visit Count:   Cancels/No Shows to date:     For today's appointment patient:    [x]  Cancelled    [] Rescheduled appointment    [] No-show     Reason given by patient:    []  Patient ill    []  Conflicting appointment    [] No transportation      [] Conflict with work    [] No reason given    [] Weather related    [] [id number]    [x] Other: Patient arrives to [facility] reporting to this writer she is concerned for L lower leg blood clot. States she has history of clots, is on blood thinners but has had increased left mid to distal calf pain with increased swelling. Patient reports calf is tight, sore and tender- especially posterior lateral aspect. Observed increased swelling/edema from mid calf to foot ( patient has had some degree of B LE swelling initial aquatic visit with L > R LE along with second visit as documented in treatment notes- however patient feels the symptoms and pain today are different than her \"normal\" swelling) , mild redness noted in distal calf and dorsum of foot; no warmth noted to touch. Patient then also expresses to this writer she has had increased SOB today- was not present yesterday with more humid weather but today since awakening feels SOB in chest. Patient advised to contact Dr office regarding concerns and contraindications for entering water for therapy if there is concern for clot and presence of SOB. Patient reports she sees [facility] and is contacting them immediately for guidance. Patient requested to R/S for tomorrow- advised patient to see what if any testing Dr would want before scheduling this week.     Comments:        [x] Next appointment was confirmed (previously had appts made for next week) Electronically signed by: Anny Veliz, PTA

## 2021-05-26 NOTE — ED PROVIDER NOTES
[address]  EMERGENCY DEPARTMENT ENCOUNTER      Pt Name: Elo Yun  MRN: [id number]  [place] 1960  Date of evaluation: 5/26/21      CHIEF COMPLAINT       Chief Complaint   Patient presents with    Shortness of Breath    Leg Swelling     left calf     HISTORY OF PRESENT ILLNESS   HPI 59 y.o. female presents with c/o sob. Symptoms have been present over the last day. She reports that she has been having leg swelling for the last few  Months. She has a h/o multiple dvts and she's been on anticoagulation for the last 3 years. Her first dvt was diagnosed about 5 years ago. She has had blood clots in both legs. She reports that over the last day she has been feeling sob. No chest pain. Leg swelling rated as moderate in severity, persistent in course. No improvement with the bumex 2mg she has been taking daily. She's been having good UOP with the bumex. Sob is with activity. At rest she denies sob. REVIEW OF SYSTEMS       Review of Systems   Constitutional: Negative for fever. HENT: Negative for congestion. Eyes: Negative for visual disturbance. Respiratory: Positive for shortness of breath. Cardiovascular: Negative for chest pain. Gastrointestinal: Negative for nausea and vomiting. Genitourinary: Negative for difficulty urinating. Musculoskeletal: Negative for back pain. Skin: Negative for rash. Neurological: Negative for headaches. Hematological: Negative for adenopathy.        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, not stated as uncontrolled(250.40)     Type II or unspecified type diabetes mellitus without mention of complication, tablet by mouth daily, Disp-30 tablet, R-3Normal      traZODone (DESYREL) 50 MG tablet Take 75 mg by mouth nightlyHistorical Med      bumetanide (BUMEX) 2 MG tablet Take 1 tablet by mouth daily, Disp-30 tablet, R-0Adjust Sig      atorvastatin (LIPITOR) 40 MG tablet Take 1 tablet by mouth daily, Disp-30 tablet, R-0Adjust Sig      carvedilol (COREG) 6.25 MG tablet Take 1 tablet by mouth 2 times daily, Disp-60 tablet, R-0Adjust Sig      ranolazine (RANEXA) 500 MG extended release tablet Take 1 tablet by mouth 2 times daily, Disp-60 tablet, R-0Adjust Sig      nitroGLYCERIN (NITROSTAT) 0.4 MG SL tablet Place 1 tablet under the tongue every 5 minutes as needed for Chest pain., Disp-25 tablet, R-3      Insulin Pen Needle (BD ULTRA-FINE PEN NEEDLES) 29G X 12.7MM MISC 6 TIMES DAILY Starting 12/23/2014, Until Discontinued, Disp-200 each, R-5, NormalFor use with each insulin      docusate sodium (COLACE) 100 MG capsule Take 100 mg by mouth 2 times daily. Lancets 28G MISC 3 TIMES DAILY Starting 10/30/2014, Until Discontinued, Disp-90 each, R-10, Normal      aspirin 81 MG tablet Take 81 mg by mouth daily. ALLERGIES     is allergic to adhesive tape, ace inhibitors, and metformin and related. FAMILY HISTORY     She indicated that the status of her father is unknown. SOCIAL HISTORY      reports that she has never smoked. She has never used smokeless tobacco. She reports that she does not drink alcohol and does not use drugs.     PHYSICAL EXAM     INITIAL VITALS: BP (!) 152/64   Pulse 61   Temp 98.6 °F (37 °C) (Oral)   Resp 16   SpO2 95%   Gen: nad  Head: Normocephalic, atraumatic  Eye: Pupils equal round reactive to light, no conjunctivitis  ENT: MMM  Neck: no JVD  Heart: Regular rate and rhythm no murmurs  Lungs: Clear to auscultation bilaterally, no respiratory distress  Chest wall: No crepitus, no tenderness palpation  Abdomen: Soft, nontender, nondistended, with no peritoneal signs  Neurologic: Patient is alert and oriented x3, motor and sensation is intact in all 4 extremities, fluent speech  Extremities: equal bilateral edema, no calf tenderness to palpation, no unequal edema, no redness or pain. MEDICAL DECISION MAKING:     MDM  59 y.o. female presenting with sob and leg edema. Differential diagnosis of CHF, symptomatic anemia,  atypical presentation of acs. Clinically I doubt pneumothorax, or pulmonary embolismi (she has been stable on anticoagulants for many years and there is no tachycardia or hypoxia). Her physicial exam is not suggestive of a dvt (no unequal edema, no erythema, no tenderness over the venous system. ). We'll obtain a cbc, cmp, ekg, troponin, bnp, and chest xray. The patient is put on a cardiac, O2 monitor. We will monitor closely and reassess. Emergency Department course:  Patient's laboratory studies show no anemia. She has a chronic troponin elevation that is improved over her previous studies and downtrending. She has CKD and this is improving. BNP is below her baseline. Chest x-ray shows no overt heart failure. No sign of pneumonia. No pneumothorax. EKG shows no acute ischemic changes. D/w pt the results, treatment plan, warning precautions for prompt ED return and importance of close OP FU, she verbalizes understanding and agrees with the treatment plan. DIAGNOSTIC RESULTS     EKG: All EKG's are interpreted by the Emergency Department Physician who either signs or Co-signs this chart in the absence of a cardiologist.    EKG shows a sinus rhythm. HR is 68, , QRS 98, , no QUINTON, No STD, No TWI, the axis is normal.        RADIOLOGY:All plain film, CT, MRI, and formal ultrasound images (except ED bedside ultrasound) are read by the radiologist and the images and interpretations are directly viewed by the emergency physician. XR CHEST PORTABLE   Final Result   Postoperative changes. Stable cardiomegaly.   No acute cardiopulmonary 35906  [phone]    If symptoms worsen      DISCHARGE MEDICATIONS:  Discharge Medication List as of 5/26/2021  9:03 PM            Merlin Mower, MD  Attending Emergency Physician                      Merlin Mower, MD  05/26/21 7044       Merlin Mower, MD  05/26/21 5063

## 2021-05-27 LAB
EKG ATRIAL RATE: 68 BPM
EKG P AXIS: 53 DEGREES
EKG P-R INTERVAL: 158 MS
EKG Q-T INTERVAL: 446 MS
EKG QRS DURATION: 98 MS
EKG QTC CALCULATION (BAZETT): 474 MS
EKG R AXIS: -8 DEGREES
EKG T AXIS: 67 DEGREES
EKG VENTRICULAR RATE: 68 BPM

## 2021-05-27 PROCEDURE — 93010 ELECTROCARDIOGRAM REPORT: CPT | Performed by: INTERNAL MEDICINE

## 2021-06-01 ENCOUNTER — HOSPITAL ENCOUNTER (OUTPATIENT)
Dept: PHYSICAL THERAPY | Age: 61
Setting detail: THERAPIES SERIES
Discharge: HOME | End: 2021-06-01
Payer: COMMERCIAL

## 2021-06-01 PROCEDURE — 97113 AQUATIC THERAPY/EXERCISES: CPT

## 2021-06-01 NOTE — FLOWSHEET NOTE
[facility] Outpatient Physical Therapy    [address] [place] [address] [address]   Phone: [phone]   Fax: [phone]    Physical Therapy Daily Treatment Note      Date:  2021  Patient Name:  Elo Yun    :  1960  MRN: [id number]  Physician:Shellie Payne CNP                                  Insurance: Medical [place]   Medical Diagnosis: Right /left leg pain              Rehab Codes: M79.604,M79.605, M25.532  Onset Date:  11/15/2018    Next 's appt.: 21  Visit Count:                 Cancel/No Show: 1/0    Subjective:  Patient reports she went to the emergency room to address her blood clot concern and states she was cleared of any blood clot, feels normal once again and is eager to resume therapy. Patient report she does not have any pain she didn't even have to use cane or walker to therapy this date. Pain:  [x] Yes  [] No Location: Left lower back near incision; denies LE symptoms Pain Rating: (0-10 scale) 0/10  Pain altered Tx:  [x] No  [] Yes  Action:  Comments: emphasis on core stability and postural awareness. Objective:  Modalities:   Precautions:  Exercises:      [facility] Exercise Log  Aquatic, Hip & DLS Program- Phase 1    Date of Eval:                                Primary PT: Suman Finney PT  Diagnosis: (B) LE pain; R Wrist Pain  Things to Focus On (goals):   Surgical Precautions: Dec. 2020 Lumbar Fusion; 2020 Thoracic Fusion  Medical Precautions: DM  [] C-9 dates  [] Occ Med   [] Medicare       Date 21    Visit # 2/12 3/12 4/12 5/12    Walk F/L/R 2 Laps @ Rail 2 Laps + R @ Rail 2 Laps @ Rail 2 Laps @ Rail    Marching 10X 10x 10x 2 Laps @ Rail    Squats 10x3\" 10x5\" 10x5\" 10x5\"    Step-Ups F/L        Step Down F/L        Heel-toe raises 10X 10x 10x 10x    SLR F/L/R 10x 10x 10x 10x    Hip/Knee Flex/Ext  10x 10x 10x    F/L Lunges                Kickboard Ex. Small Small Small     Iso Abd. 1 or 2 muscles        [] 3 or more muscles     []  Other     Total Treatment time 35 2     Time In: 255 PM        Time Out: 330 PM    Electronically signed by:  Yohannes Juárez PTA

## 2021-06-02 NOTE — CONSULTS
TREATMENT LOCATION:   [] C/ [facility] [address] [place] [address] [place] [address]: [phone]  F: [phone] [x] [address]: [phone]  F: [phone]      Lymphedema Services - Initial Evaluation for Lower Extremity    Date:  2021  Patient: Malachi Ahn  : 1960             MRN: [id number]  Referring Physician: AFSANEH Luis       Phone: [phone]  Fax: [phone]  Insurance: [employer]- No Copay, 40 visit limit combined with PT/OT, No Preauth required. # of visits allowed/remainin  Medical Diagnosis: Lymphedema   Rehab Codes: I89.0   Onset Date: 21  Visit# / total visits:     POC UPDATE AT VISIT: 10    Allergies:  [x] Adhesive tape    [x] Medications    Medications: See charted information in Epic  Past Medical History: See charted information in Epic     Restrictions: None  Fall Risk:   [x] No    [] Yes   If yes, intervention:       Overview: Patient is a 59year old female referred to the Lymphedema Clinic with a diagnosis of bilateral Lower Extremity Lymphedema. Pt arrives to today's treatment with her sister. She was previously seen at [facility] lymphedema clinic aprox 3 years ago. During this time she was set up with velcro devices and a BIOTAB vasopneumatic pump. She states that she stopped using the devices about 2 years ago as she started going through several back surgeries, therapy and trying to manage her health better. Overall life has happened and she would like to get the swelling back under control so she can become more active and more independent again. Pt feels that she is holding the fluid in her abdomen now as well as the distal lower extremity.      Pt starting aquatic therapy       PHYSICIAN NOTES from 21:     Kwesi Zurita a 59 y.o. female who presents today for her medical conditions/complaints of 1 Month Follow-Up           HPI      Presents for follow up on treatment of chronic medical concerns to include: CAD, chronic pain, hyperlipidemia, diabetes, anxiety, depression. She did see Dr. Mark De La Cruz and he lowered her bumex to 1 mg daily from 2 mg daily. She has gained about 15 lb, legs are swelling and she does feel her abdomen is distended. Is not eating more and does feel she is more active. Has also seen nephrology, was on bumex 2 mg at the time, creatinine is elevated but he did feel this was stable. She will see him again with labs prior in July. Is to follow up with cardiology in August, also to follow up with endocrinology and podiatry in August. She is starting water therapy today and will go 2 times a week, is looking forward to this. Is walking more with her wheeled walker, is able to walk the length of the yang to see her dad, has not been able to do this in the past. Has a 30 day event monitor on. Complete medical, surgical, family, social histories, medications, and allergies were reviewed in the discrete data sections of the patient's chart. Review of all systems completed, see abnormals in ROS section, otherwise is negative. Medication list reviewed, patient states is taking as directed and tolerating without concerns. bumex was decreased by cardiology about one week ago. Feels like she is not peeing as much and her left leg is swelling.  Is on isosorbide 30 mg in the am.       Hx of Complete Decongestive Therapy:[x]Yes - Date:  2018- [facility]        []No   Rate of onset:   [x] Slow   [] Rapid     [] Acute   Progression: [] Improving   [x]  Worsening  [] Consistent   Conservative Treatments Utilized in the Past:  [] None  [x] Compression Garments   [] Bandaging  [] Elevation   [x] Exercise  [x]Self MLD  [] Other/comments:      Current Lymphedmea Treatments:   [x] None  [] Compression Garments   [] Bandaging  [] Elevation  [] Exercise   []Self MLD  [] Other/comments:          Aggravating factors:  [] None   [x] Activity  [] Stress  [x] Sitting Independent  [] Assist    Driving [x] Independent  [] Assist [x] Independent  [] Assist    Housekeeping [x] Independent  [] Assist [x] Independent  [] Assist Needs to take occasional breaks   Grocery shop/meal prep [x] Independent  [] Assist [] Independent  [x] Assist Sister assist, getting back into. Gait Prior level of function Current level of function    [x] Independent  [] Assist [x] Independent  [] Assist   Device: [] Independent [] Independent    [] Straight Cane [] Quad cane [] Straight Cane [] Quad cane    [] Standard walker [] Rolling walker   [] 4 wheeled walker [] Standard walker [x] Rolling walker   [] 4 wheeled walker    [] Wheelchair [] Wheelchair     ** RW used for distance walking, no devices at home     OBJECTIVE  Presentation of Affected Areas  Location: bilateral Lower Extremity    Description: Dry/scaly skin  Pitting 2+  Asotin hump on dorsum of foot  Doughy     Scars No   Wounds None   Sensation Numbness   Additional Comments:        Circumferential Measurements  Measurements taken from nail base of D2 digit. Measurements (cm) Right Left   Dorsum   9 23.4 24.5   Inframalleolar   13       32.5 34.5   Supramalleolar      17 24.5 28.0   Lower Calf 26 28.7 33.5   Mid Calf 33 34.0 38.5   Upper Calf  40 38.0 40.0   Knee   48 37.0 37.0   10 cm above knee   68 48.0 45.5   Thigh-widest     Hip-widest     Initial Total 6/2/2021 :  266.1 281.5        ASSESSMENT: Patient would benefit from skilled occupational therapy services in order to:Decrease edema that has accumulated in the extremity, decrease risk of infection, improve limb ROM, and improve overall quality of life. Pt is provided with a folder which included educational hand out on the basics of lymphedema, program details and what to expect for further review at home. Writer educates on an impaired lymphatic system vs. a healthy lymphatic system and how it relates to swelling and skin integrity.  Pt is also educated, in more detail, on the purpose of lymphedema treatments and a POC that would be of benefit to them. This may include:     [x]Bandaging   []Self-Bandaging/Caregiver Training   [x]MLD    [x]Self-MLD   [x] Therapeutic Exercise    [x] Education/Instruction in home management program  [x] Education and trial of a Vasopneumatic Pump  ( possible pump upgrade from the basic pump)   [x] Education and transition to long term management devices such as velcro compression garments and/or daytime compression sleeve/stocking(s)   [x]Discharge to Home Program     Response to treatment: Pt verbalizes and is agreeable to the instructions/ POC established at today's evaluation. PLAN FOR NEXT VISIT: Initiate CDT, educate risk reduction techniques, Initate bandaging of the L LE        Lymphedema Stage per ISL Guidelines    [] 0: Subclinical with no evidence on physical exam  [] 1:  Early onset, swelling/heaviness, pitting edema, subsides with limb elevation  [] 2:  More advanced, fibrosis resulting in non-pitting edema, does not respond to elevation, thickening of the tissues  [x] 3: Elephantiasis, pitting absent, huge limbs with dry/scaly, papillomatosis, hyperkeratosis, fluid may be leaking with recurrent cellulitis. Acanthosis (deep body folds). Elevation &   diuretics ineffective. Therapy Goals  STG - To be addressed within 2 visits    Pt will demonstrate compliance of maintaining lymphedema precautions to reduce the risks of infection and further exacerbations. Pt will demonstrate independence with decongestive exercise program in order to expedite fluid rerouting. LTG To be adressed within 7 visits     Pt will demonstrate competence with SELF MLD (Manual lymphatic drainage) in order to reroute lymphatic pathways for decreased swelling. Pt/Caregiver will demonstrate independence with donning/doffing and wearing schedule for compression garments/ devices to maintain decreased size upon discharge.     Pt will demonstrate compliance with skin care routine for improved overall skin integrity and decreased risk of wounds and infection. Pt to compliant with CDT in order to reduce edema in the L LE by 5+ cm and the R LE by 2+ cm . Patient's Goal: I would like to get rid of all this fluid in me. Response to Education Provided:  [x] Verbalized understanding   [] Demonstrates/verbalizes HEP/Education previously given      [] Needs continued review            [] No understanding   Learner(s): [x] Patient  [] Spouse [x] Family  [] Other:   Method(s): [x] Verbal [] Demonstration [x] Handouts [] Exercise booklet   Family available to assist with home program if needed: [x] Yes [] No    FREQUENCY: Pt will be seen 1 x/ week for 10 visits and will follow up as appropriate. Focus is to remain on short and long term goals listed above. Rehabilitation Potential/Commitment: [] Good [x] Fair     [] Poor    Functional Assessment Used: Lymphedema Life Impact Scale (LLIS) Score: [x] Eval 63%   [] 10th visit____  [] D/C ____     Clearance needed:  []Yes    [x]No     Physicians/specialties giving clearance:               Treatment Charges   Minutes   Units   Evaluation (05077)            Low            Moderate 45 1          High     Manual Therapy (37069): Therapeutic activities (62061): Therapeutic Exercise (40332)     Self care/home mgmt (70171) 15 1   Other: Total Treatment Time    60 2       Time In:  10:00  Time Out: 11:00      Electronically signed by Sadi Pennington OT on 6/2/2021 at 3:05 PM      Physician Signature: _________________________        Date: _______________  By signing above or cosigning this note, I have reviewed this plan of care and certify a need to continue medically necessary rehabilitation services.       *PLEASE SIGN ABOVE AND FAX BACK .[phone] WITH ALL PAGES FOR CONSENT TO CONTINUE LYMPHEDEMA TREATMENT*

## 2021-06-03 ENCOUNTER — HOSPITAL ENCOUNTER (OUTPATIENT)
Dept: PHYSICAL THERAPY | Age: 61
Setting detail: THERAPIES SERIES
Discharge: HOME | End: 2021-06-03
Payer: COMMERCIAL

## 2021-06-03 ENCOUNTER — HOSPITAL ENCOUNTER (OUTPATIENT)
Dept: OCCUPATIONAL THERAPY | Age: 61
Setting detail: THERAPIES SERIES
Discharge: HOME | End: 2021-06-03
Payer: COMMERCIAL

## 2021-06-03 PROCEDURE — 97166 OT EVAL MOD COMPLEX 45 MIN: CPT

## 2021-06-03 PROCEDURE — 97110 THERAPEUTIC EXERCISES: CPT

## 2021-06-03 PROCEDURE — 97535 SELF CARE MNGMENT TRAINING: CPT

## 2021-06-03 PROCEDURE — 97113 AQUATIC THERAPY/EXERCISES: CPT

## 2021-06-03 NOTE — FLOWSHEET NOTE
[facility] Outpatient Physical Therapy   [id number]   Phone: [phone]   Fax: [phone]    Physical Therapy Daily Treatment Note      Date:  6/3/2021  Patient Name:  Jade Nobles    :  1960  MRN: [id number]  Physician:Shellie Payne CNP                                  Insurance: Medical [place]   Medical Diagnosis: Right /left leg pain              Rehab Codes: M79.604,M79.605, M25.532  Onset Date:  11/15/2018    Next 's appt.: 21  Visit Count:                 Cancel/No Show: 1/0    Subjective:  Patient reports she had a very long and busy day so far that has had her on her feet and moving a lot so therefore increased pain levels this date. Additionally she present for re-assessment by primary PT. Please see note from José Luis Stearns PT  this date (6/3/2021) for assessment. Pain:  [x] Yes  [] No Location: Left lower back near incision; denies LE symptoms Pain Rating: (0-10 scale) 4/10  Pain altered Tx:  [x] No  [] Yes  Action:  Comments: emphasis on core stability and postural awareness.    Objective:  Modalities:   Precautions:  Exercises:      [facility] Exercise Log  Aquatic, Hip & DLS Program- Phase 1    Date of Eval:                                Primary PT: José Luis Stearns PT  Diagnosis: (B) LE pain; R Wrist Pain  Things to Focus On (goals):   Surgical Precautions: Dec. 2020 Lumbar Fusion; 2020 Thoracic Fusion  Medical Precautions: DM  [] C-9 dates  [] Occ Med   [] Medicare       Date 5/18/21 5/19/21 5/24/21 6/1/21 6/3/21   Visit # 2/12 3/12 4/12 5/12 6/12   Walk F/L/R 2 Laps @ Rail 2 Laps + R @ Rail 2 Laps @ Rail 2 Laps @ Rail 2 Laps @ Rail   Marching 10X 10x 10x 2 Laps @ Rail 2 Laps @ . [place] 10 10x3\" 10x5\" 10x5\" 10x5\" 10x5\"   Step-Ups F/L        Step Down F/L        Heel-toe raises 10X 10x 10x 10x 10x   SLR F/L/R 10x 10x 10x 10x 10x   Hip/Knee Flex/Ext  10x 10x 10x 10x   F/L Lunges Charges: Mins Units   []  Modalities     [x]  Ther Exercise 22 1   []  Manual Therapy     []  Ther Activities     [x]  Aquatics 40 3   []  Neuromuscular     [] Vasocompression     [] Gait Training     [] Dry needling        [] 1 or 2 muscles        [] 3 or more muscles     []  Other     Total Treatment time 40 +22 3 +1 = 4 total     Time In: 250 PM        Time Out: 330 PM    Additional Time for re-assessment with Ivelisse Mejia PT.    Time in: 1558           Time Out: 1620   Total time for Re-assessment: 22 minutes      Electronically signed by:  Almaz Lira PTA

## 2021-06-03 NOTE — PROGRESS NOTES
509 [facility] Outpatient Physical Therapy  [address] [place] [address]. [address]         Phone: [phone]       Fax: [phone]    Physical Therapy Progress Note    Date: 6/3/2021      Patient: Luís Eckert  : 1960  MRN: [id number]   [address] [place] [address]                                  Insurance: [facility], [facility] Diagnosis: Right /left leg pain                Rehab Codes: M79.604,M79.605, M25.532  Onset Date:  11/15/2018    Next 's appt.: 21  Visit Count:                 Cancel/No Show: 10     Date of initial visit: 2021               Date of final visit: 2021      Subjective: Pt arrives after aquatic PT session. Notes she is having soreness today. Feels that her symptoms are about the same as eval (). Notes her LEs are increased with edema (going to lymphedema clinic to manage this). Reports that her back pain is fluctuating with at times having no pain and at other times having higher pain. Feels that her pain in the pool is decreased but increases once overground. Most limited with walking distances with walker. Would like to be able to get out in the community more and walk longer distances without significant increase in back pain. Pain:  [x] Yes  [] No  Location: back  Pain Rating: (0-10 scale) 5/10  Pain altered Tx:  [] No  [] Yes  Action:  Comments: Pain improved since evaluation. Objective:  Test Measurements:    STRENGTH   STRENGTH   ROM     Left Right   Left Right Cervical     C5 Shld Abd     L1-2 Hip Flex 3+/5 3+/5 Flexion     Shld Flexion     Hip Abd 3/5 3/5 Extension     Shld IR     L3-4 Knee Ext 4/5 3+/5 Rotation L R   Shld ER     L4 Ankle DF 4/5 4/5 Sidebend L R   C6 Elb Flex     L5 EHL 3/5 3/5  Retraction     C7 Elb Ext     S1 Plant.  Flex 4/5 3+/5 Lumbar     C8 EPL     Abdominals     Flexion 75% loss   T1 Fing Abd     Erector Spinae 2/5 2/5 Extension ~50% loss              Rotation L  R          multifidus (tested with bent over leg raise)  3/5  3/5 Sidebend L R               UE/LE                                                                                                           Green text = improvement or new measure   Red text = decrease in function      Function:  FUNCTION Normal Difficult Unable   Sitting [x]?  []?  []?    Standing []?  [x]?  - fluctuating pain []?    Ambulation []?  [x]?  - pain increases with short distance []?    Groom/Dress [x]?  []?  []?    Lift/Carry []?  [x]?  -- pain inc []?    Stairs []?  [x]?  []?    Bending []?  [x]?  -- moderate []?    OH reach []?  [x]?  []?    Sit to Stand []?  [x]?  []?          Assessment:   Pt continues to be limited in function by low back pain and leg pain. She feels that the pool has benefited her in reducing pain and increasing tolerance to activity. Objectively her lumbar flexion and LE strength measures have improved. She still has strength deficits, especially in the core and back extensors, that are likely leading to the increased pain with prolonged standing and walking. To continue to optimize strength and improve pain control, continuing with aquatic therapy is supported. Pt educated on there being options for aquatic rehab in the community but could use further education. She continues to have goals and make progress towards established goals (see below). She would benefit from continuing therapy to address strength, improve pain, and increase activity tolerance to improve community level mobility and reach her goal of being able to go shopping with family. STG: (to be met in 6 treatments)  1. ? Pain: Lesson pain to 2/10.    6/3/2021: Partially met -- pain is fluctuating now and less intense 5/10 this date compared to 7/10 at [facility]   2. ? ROM: lumbar AAROM into extension 0 % loss.     6/3/2021: Ongoing goal -- still very limited into extension   3. ? Strength: increase strength into extension to 3/5.    6/3/2021: MODIFY GOAL: Pt will be able to perform full leg extension in bent over position to increase multifidus strength to support joint to increase tolerance to upright activity   4. ? Function: OPTIMAL score of 9/3.   6/3/2021: Progressing -- OPTIMAL score 10/3 for carrying, walking, bending   5. Independent with Home Exercise Programs  6/3/2021: MET -- pt poses no questions   6. Demonstrate Knowledge of fall prevention  6/3/2021: MET - pt notes strategies but has not implemented   LTG: (to be met in 12 treatments)  1. OPTIMAL score at the time of discharge of 6/3.    2. ADDED: Pt will be able to tolerate >10 minutes of ambulation with LRAD per subjective report with no greater than 3 point increase in pain to progress to optimized community mobility. 3. ADDED: Pt will be able to carry a light object (<10 lbs) without an increase in pain to show improved tolerance to household duties. 4. ADDED: Pt will be educated on community resources for aquatic community wellness program to carry over relief pt gets from PT sessions to continue to remain active. Treatment to Date:  [x] Therapeutic Exercise   18296  [] Iontophoresis: 4 mg/mL Dexamethasone Sodium Phosphate  mAmin  78912   [] Therapeutic Activity  12828 [] Vasopneumatic cold with compression  56087    [] Gait Training   21622 [] Ultrasound   07528   [] Neuromuscular Re-education  83673 [] Electrical Stimulation Unattended  51352   [] Manual Therapy  22126 [] Electrical Stimulation Attended  01595   [x] Instruction in HEP  [] Lumbar/Cervical Traction  10660   [x] Aquatic Therapy   69442 [] Cold/hotpack    [] Massage   83958      [] Dry Needling, 1 or 2 muscles  77579   [] Biofeedback, first 15 minutes   78650  [] Biofeedback, additional 15 minutes   93941 [] Dry Needling, 3 or more muscles  42453      Patient Status:     [x] Continue per initial plan of care. [] Additional visits necessary. [] Other:     Requested Frequency/Duration: 2 times per week for remaining treatments.  Will re

## 2021-06-08 ENCOUNTER — HOSPITAL ENCOUNTER (OUTPATIENT)
Dept: SPEECH THERAPY | Age: 61
Setting detail: THERAPIES SERIES
Discharge: HOME | End: 2021-06-08
Payer: COMMERCIAL

## 2021-06-08 PROCEDURE — 92523 SPEECH SOUND LANG COMPREHEN: CPT

## 2021-06-09 ENCOUNTER — HOSPITAL ENCOUNTER (OUTPATIENT)
Dept: PHYSICAL THERAPY | Age: 61
Setting detail: THERAPIES SERIES
Discharge: HOME | End: 2021-06-09
Payer: COMMERCIAL

## 2021-06-09 PROCEDURE — 97113 AQUATIC THERAPY/EXERCISES: CPT

## 2021-06-09 NOTE — PROGRESS NOTES
Speech Language Pathology  Facility/Department: [address]  [place] [address], [address] [place] [address],[address]  [address]  Phone: [phone]  Fax: [phone]    Initial Assessment    NAME: Emelina Painting  : 1960  MRN: [id number]    Date of Eval: 2021  Evaluating Therapist: BRAYAN Galvez          Past Medical History: has a past medical history of Backache, unspecified, CHF (congestive heart failure) ([address] Chronic kidney disease, Coronary atherosclerosis of artery bypass graft, Cramp of limb, Gallstones, Hypertension, Insomnia, Pneumonia, Type II or unspecified type diabetes mellitus with renal manifestations, not stated as uncontrolled(250.40), Type II or unspecified type diabetes mellitus without mention of complication, not stated as uncontrolled, and Unspecified vitamin D deficiency. Past Surgical History:  has a past surgical history that includes Coronary artery bypass graft; Knee arthroscopy; Carpal tunnel release; Breast surgery; Tonsillectomy; Hand surgery; Ankle fracture surgery; and Cholecystectomy, open (N/A). Primary Complaint: Pt arrives this afternoon complaining of vocal dysphonia as well as dysphagia s/p cervical spine surgery (anterior approach) on 3/14/2020. Pt reports that she has never seen an ENT for dysphonia and never completed modified barium swallow study to assess swallow function. Onset Date: 20    Pain:   no    Assessment:  Cognitive Diagnosis: na  Aphasia Diagnosis: na  Speech Diagnosis: Pt presents this date with moderate dysphonia marked by breathiness/hoarseness. Decreased respiration for speech also noted. Diagnosis: Vocal dysphonia/dysphagia      Subjective:     General  Chart Reviewed: Yes  Patient assessed for rehabilitation services?: Yes  Additional Pertinent Hx: Pt with hx of cervical spine sx on 2020.  Pt reports voice/swallowing difficulty since operation  Family / Caregiver Present: No  General Comment  Comments: Pt reports voice/swallowing change following cervical spine surgery in march of 2020. Pt has never seen ENT or had modified barium swallow study completed. Visit Information  Onset Date: 03/14/20  Subjective  Subjective: \"I keep swallowing and swallowing and it eventually goes down\". Pt also reports, \"this doesn't sound like me\" referring to her voice. Vital Signs  Patient Currently in Pain: No        Vision  Vision: Within Functional Limits  Hearing  Hearing: Within functional limits           Objective:     Oral/Motor  Oral Motor: Within functional limits  Auditory Comprehension  Comprehension: Within Functional Limits     Expression  Primary Mode of Expression: Verbal  Verbal Expression  Verbal Expression: Within functional limits     Motor Speech  Motor Speech: Within Functional Limits  Pragmatics/Social Functioning  Pragmatics: Within functional limits       Cognition  Orientation  Overall Orientation Status: Within Normal Limits  Attention  Attention: Within Functional Limits  Memory  Memory: Within Funtional Limits  Problem Solving  Problem Solving: Within Functional Limits    Additional Assessments:  Voice Evaluation  Vocal Quality: Exceptions to [facility]  Breath Support: Inadequate for speech  Breathy: Moderate  Hoarse: Moderate  Dysphonic: Moderate  Vocal Intensity: Mildly decreased  Maximum Phonation Time: 12.8 seconds  S/Z Ratio: 1:1        Swallowing assessment:  Bedside swallow evaluation completed this date in conjunction with voice evaluation. Pt provided trials of regular solids and thin liquids. Pt demonstrated no overt s/s of aspiration with all trials provided but did report globus sensation in throat following 4 trials of regular solids. Pt reports that this happens to her \"every day\" and that she either swallows multiple times to relieve this sensation or uses liquid wash.  Recommend pt complete MBSS to fully assess swallow function and make appropriate diet recommendation as well as therapy recommendation. ST will target goals provided by evaluating MBSS therapist. Physician notified of recommendation. Plan:    Goals:   Short-term Goals  Goal 1: Pt will complete appropriate swallowing exercises pending MBSS results with returned demo at 100%  Goal 2: Pt will complete appropriate vocal exercises pending ENT recommendations with returned demo at 100%  Goal 3: Pt will tolerate LRD with no overt s/s of aspiration per MBSS recommendations 100% of the time. Goal 4: Increase pt ed re vocal hygiene strategies with returned demo at 100%  Long-term Goals  Goal 1: Pt will complete all swallowing and voice exercises with returned demo at 100% by end of POC.   Speech Therapy Prognosis  Prognosis: Fair  Duration/Frequency of Treatment  Duration/Frequency of Treatment: 2x per week, 12 total visits pending ENT recommendation and MBSS recommendations  Recommendations  Requires SLP Intervention: Yes  Referral To: ENT, Dysphagia evaluation (MBSS and ENT recommended prior to initiating treatment)  D/C Recommendations: Home independently  Patient Education: yes  Patient Education Response: Verbalizes understanding  D/C Recommendations: Home independently  Requires SLP Intervention: Yes  Patient/family involved in developing goals and treatment plan: yes, pt             Waylan Loffler, SLP M.A., [address] [facility]

## 2021-06-09 NOTE — FLOWSHEET NOTE
509 [facility] Outpatient Physical Therapy   [address]   Phone: [phone]   Fax: [phone]    Physical Therapy Daily Treatment Note      Date:  2021  Patient Name:  Jim Lomax    :  1960  MRN: [id number]  Physician:Shellie Payne CNP                                  Insurance: [employer] [place]   Medical Diagnosis: Right /left leg pain              Rehab Codes: M79.604,M79.605, M25.532  Onset Date:  11/15/2018    Next 's appt.: 21  Visit Count:                 Cancel/No Show: 1/0    Subjective: Increased pain today- states she has had a busy day on her feet all morning. Denies LE symptoms this date. Reports she always feels better while in the water. Wearing compression stockings for lymphedema- has follow up with therapist for this issue     Pain:  [x] Yes  [] No Location: Centralized lower back Pain Rating: (0-10 scale) 7/10  Pain altered Tx:  [x] No  [] Yes  Action:  Comments: emphasis on core stability and postural awareness. Objective:  Modalities:   Precautions:  Exercises:      [facility] J Exercise Log  Aquatic, Hip & DLS Program- Phase 1    Date of Eval:                                Primary PT: Oral Banerjee, JUAN CARLOS  Diagnosis: (B) LE pain; R Wrist Pain  Things to Focus On (goals):   Surgical Precautions: Dec. 2020 Lumbar Fusion; 2020 Thoracic Fusion  Medical Precautions: DM  [] C-9 dates  [] Occ Med   [] Medicare       Date 5/24/21 6/1/21 6/3/21 6/9/21    Visit #     Walk F/L/R 2 Laps @ Rail 2 Laps @ Rail 2 Laps @ Rail 2 Laps @ Rail    Marching 10x 2 Laps @ [address] @  [address] @ [facility] 10x5\" 10x5\" 10x5\" 12x5\"    Step-Ups F/L     Add   Step Down F/L        Heel-toe raises 10x 10x 10x 12x    SLR F/L/R 10x 10x 10x 12x    Hip/Knee Flex/Ext 10x 10x 10x 12x    F/L Lunges                Kickboard Ex.  Small  Medium Med    Iso Abd. 10x5\" 10x5\" 10x5\" 10x5\"    Push-pull 10x 10x 10x implemented   LTG: (to be met in 12 treatments)  1. OPTIMAL score at the time of discharge of 6/3.    2. ADDED: Pt will be able to tolerate >10 minutes of ambulation with LRAD per subjective report with no greater than 3 point increase in pain to progress to optimized community mobility. 3. ADDED: Pt will be able to carry a light object (<10 lbs) without an increase in pain to show improved tolerance to household duties. 4. ADDED: Pt will be educated on community resources for aquatic community wellness program to carry over relief pt gets from PT sessions to continue to remain active. Pt. Education:  [x] Yes  [] No  [x] Reviewed Prior HEP/Ed  Method of Education: [x] Verbal  [x] Demo  [] Written  Comprehension of Education:  [x] Verbalizes understanding. [x] Demonstrates understanding. [] Needs review. [] Demonstrates/verbalizes HEP/Ed previously given. Plan: [x] Continue per plan of care.    [] Other:      Treatment Charges: Mins Units   []  Modalities     []  Ther Exercise     []  Manual Therapy     []  Ther Activities     [x]  Aquatics 38 3   []  Neuromuscular     [] Vasocompression     [] Gait Training     [] Dry needling        [] 1 or 2 muscles        [] 3 or more muscles     []  Other     Total Treatment time 38 3     Time In: 125 PM        Time Out:  PM        Electronically signed by:  Monroe Tuttle PTA

## 2021-06-10 ENCOUNTER — HOSPITAL ENCOUNTER (OUTPATIENT)
Dept: PHYSICAL THERAPY | Age: 61
Setting detail: THERAPIES SERIES
Discharge: HOME | End: 2021-06-10
Payer: COMMERCIAL

## 2021-06-10 PROCEDURE — 97113 AQUATIC THERAPY/EXERCISES: CPT

## 2021-06-10 NOTE — FLOWSHEET NOTE
10x 10x 10x   Paddling     10x           UE Format:        Horiz Abd/Add 10x 10x 10x 12x 12x   IR/ER (wipers)   10x 12x 12x   Alt Flex/Ext 10x 10x 10x 12x 12x   Alt Press Down    12x 12x   Abd/Add 10x 10x 10x 12x 12x           Deep Water: 1 Noodle 1 Noodle 1 Noodle 1 Noodle 1 Noodle   Hang 3' 5' 5' 3' 3'   Cycling 1' 1' 1' 2' 2'   Jacks  1' 1' 1' 1'   X-Country  1' 1' 1' 1'           Balance        SLS                Stretches        Achllies 2x20\" 2x20\" 2x20\" 2x20\" 2x20\"   Hamstring    2x20\" 2x20\"           Cool Down 2 Laps  2 laps 1 Lap 2 Laps   Pain Rating 4-5 1-2 2-3 7 6       Specific Instructions for next treatment: Increase reps and decrease UE support for LE exercises. Assessment: [x] Progressing toward goals. Added step ups to low box, Kb paddling and increased speed of LE exercises to challenge core and hip stability. Notes some discomfort at end of treatment returning to [facility] after deep water hang today. [x] Patient would continue to benefit from skilled physical therapy services in order to address strength, improve pain, and increase activity tolerance to improve community level mobility and reach her goal of being able to go shopping with family.      STG: (to be met in 6 treatments)-GOALS ADDRESSED BY EVALUATING PT 6/3/21  1. ? Pain: Lesson pain to 2/10.        6/3/2021: Partially met -- pain is fluctuating now and less intense 5/10 this date compared to 7/10 at [facility]   2. ? ROM: lumbar AAROM into extension 0 % loss.        6/3/2021: Ongoing goal -- still very limited into extension   3. ? Strength: increase strength into extension to 3/5.    6/3/2021: MODIFY GOAL: Pt will be able to perform full leg extension in bent over position to increase multifidus strength to support joint to increase tolerance to upright activity   4. ? Function: OPTIMAL score of 9/3.   6/3/2021: Progressing -- OPTIMAL score 10/3 for carrying, walking, bending   5.  Independent with Home Exercise Programs  6/3/2021: MET -- pt poses no questions   6. Demonstrate Knowledge of fall prevention  6/3/2021: MET - pt notes strategies but has not implemented   LTG: (to be met in 12 treatments)  1. OPTIMAL score at the time of discharge of 6/3.    2. ADDED: Pt will be able to tolerate >10 minutes of ambulation with LRAD per subjective report with no greater than 3 point increase in pain to progress to optimized community mobility. 3. ADDED: Pt will be able to carry a light object (<10 lbs) without an increase in pain to show improved tolerance to household duties. 4. ADDED: Pt will be educated on community resources for aquatic community wellness program to carry over relief pt gets from PT sessions to continue to remain active. Pt. Education:  [x] Yes  [] No  [x] Reviewed Prior HEP/Ed  Method of Education: [x] Verbal  [x] Demo  [] Written  Comprehension of Education:  [x] Verbalizes understanding. [x] Demonstrates understanding. [] Needs review. [] Demonstrates/verbalizes HEP/Ed previously given. Plan: [x] Continue per plan of care.    [] Other:      Treatment Charges: Mins Units   []  Modalities     []  Ther Exercise     []  Manual Therapy     []  Ther Activities     [x]  Aquatics 40 3   []  Neuromuscular     [] Vasocompression     [] Gait Training     [] Dry needling        [] 1 or 2 muscles        [] 3 or more muscles     []  Other     Total Treatment time 40 3     Time In: 6547        Time Out: 4481    Electronically signed by:  Erika Hilario PTA

## 2021-06-15 ENCOUNTER — HOSPITAL ENCOUNTER (OUTPATIENT)
Dept: PHYSICAL THERAPY | Age: 61
Setting detail: THERAPIES SERIES
Discharge: HOME | End: 2021-06-15
Payer: COMMERCIAL

## 2021-06-15 PROCEDURE — 97113 AQUATIC THERAPY/EXERCISES: CPT

## 2021-06-15 NOTE — FLOWSHEET NOTE
509 [facility] Outpatient Physical Therapy   [address] [facility] [address]   Phone: [phone]   Fax: [phone]    Physical Therapy Daily Treatment Note      Date:  6/15/2021  Patient Name:  Jim Lomax    :  1960  MRN: [id number]  Physician:Shellie Payne CNP                                  Insurance: [employer]   Medical Diagnosis: Right /left leg pain              Rehab Codes: M79.604,M79.605, M25.532  Onset Date:  11/15/2018    Next 's appt.: 21  Visit Count:                 Cancel/No Show: 1/0    Subjective:  Pt reports increased pain in center of lumbar back this morning. States was helping her sister trim trees by helping collect small branches from the ground with her walker. Notes pain slightly off to left along waist line today. Pain:  [x] Yes  [] No Location: lower back and slightly off to the left Pain Rating: (0-10 scale) 8/10  Pain altered Tx:  [x] No  [] Yes  Action:  Comments: emphasis on core stability and postural awareness. Objective:  Modalities:   Precautions:  Exercises:      [facility] J Exercise Log  Aquatic, Hip & DLS Program- Phase 1    Date of Eval:                                Primary PT: Oral Banerjee, JUAN CARLOS  Diagnosis: (B) LE pain; R Wrist Pain  Things to Focus On (goals):   Surgical Precautions: Dec. 2020 Lumbar Fusion; 2020 Thoracic Fusion  Medical Precautions: DM  [] C-9 dates  [] Occ Med   [] Medicare       Date 6/3/21 6/9/21 6/10/21 6/15/21   Visit #    Walk F/L/R 2 Laps @ Rail 2 Laps @ Rail 2 Laps @ Rail 2 Laps @ RAil    2 Laps @  [address] @ [facility] @  [address] @ [address] 10x5\" 12x5\" 12x5\" 12x5\"   Step-Ups F/L   Low 10x Low 12x   Step Down F/L       Heel-toe raises 10x 12x 12x 12x   SLR F/L/R 10x 12x 12x 12x   Hip/Knee Flex/Ext 10x 12x 12x 12x   F/L Lunges              Kickboard Ex.  Medium Med Med Med   Iso Abd. 10x5\" 10x5\" 10x5\" 12x5\"   Push-pull 10x

## 2021-06-16 ENCOUNTER — HOSPITAL ENCOUNTER (OUTPATIENT)
Dept: GENERAL RADIOLOGY | Age: 61
Discharge: HOME | End: 2021-06-18
Payer: COMMERCIAL

## 2021-06-16 DIAGNOSIS — R49.9 HOARSENESS OR CHANGING VOICE: ICD-10-CM

## 2021-06-16 DIAGNOSIS — R13.10 DYSPHAGIA, UNSPECIFIED TYPE: ICD-10-CM

## 2021-06-16 PROCEDURE — 74230 X-RAY XM SWLNG FUNCJ C+: CPT

## 2021-06-16 PROCEDURE — 92611 MOTION FLUOROSCOPY/SWALLOW: CPT

## 2021-06-16 NOTE — PROCEDURES
INSTRUMENTAL SWALLOW REPORT  MODIFIED BARIUM SWALLOW    NAME: Emelina Painting   : 1960  MRN: [id number]       Date of Eval: 2021              Referring Diagnosis(es):  dysphagia    Past Medical History:  has a past medical history of Backache, unspecified, CHF (congestive heart failure) ([facility] [address] Chronic kidney disease, Coronary atherosclerosis of artery bypass graft, Cramp of limb, Gallstones, Hypertension, Insomnia, Pneumonia, Type II or unspecified type diabetes mellitus with renal manifestations, not stated as uncontrolled(250.40), Type II or unspecified type diabetes mellitus without mention of complication, not stated as uncontrolled, and Unspecified vitamin D deficiency. Past Surgical History:  has a past surgical history that includes Coronary artery bypass graft; Knee arthroscopy; Carpal tunnel release; Breast surgery; Tonsillectomy; Hand surgery; Ankle fracture surgery; and Cholecystectomy, open (N/A). Current Diet Solid Consistency: Regular  Current Diet Liquid Consistency: Thin       Type of Study: Initial MBS       Recent CXR/CT of Chest: Date - CXR-   Postoperative changes.  Stable cardiomegaly.  No acute cardiopulmonary   process or change from prior exam.  No overt failure. Patient Complaints/Reason for Referral:  Emelina Painting was referred for a MBS to assess the efficiency of his/her swallow function, assess for aspiration, and to make recommendations regarding safe dietary consistencies, effective compensatory strategies, and safe eating environment. Onset of problem:    Pt. Reports voice change and difficulty swallowing since Anterior Cervical Disc Fusion surgery on 19. Pt. Reports she is awaiting being scheduled for ENT assessment. Behavior/Cognition/Vision/Hearing:  Behavior/Cognition: Alert; Cooperative  Vision: Within Functional Limits  Hearing: Within functional limits    Impressions:     Patient Position: Lateral     Consistencies Administered: Dysphagia Soft and Bite-Sized (Dysphagia III); Reg solid;Pudding teaspoon; Thin straw; Thin cup;Nectar cup                        Upper Esophageal Screen: ACDF hardware noted at C5-7. Pt. demo. throat clear during swallow when food passing near hardware. Dysphagia Outcome Severity Scale: Level 6: Within functional limits/Modified independence       Recommended Diet:  Solid consistency: Regular  Liquid consistency: Thin            Safe Swallow Protocol:     Compensatory Swallowing Strategies: Eat/Feed slowly;Upright as possible for all oral intake;Small bites/sips; Alternate solids and liquids              Recommendations/Treatment  Requires SLP Intervention: No                        Referral To: ENT (for voice assessment, moderate dysphonia noted)    Education: Images and recommendations were reviewed with pt.  following this exam.      Patient Education Response: Verbalizes understanding                              Oral Preparation / Oral Phase  Oral Phase: WNL (mild premature vallecular spillage)        Pharyngeal Phase  Pharyngeal Phase: WNL      Esophageal Phase  Esophageal Screen: WNL        Pain   Patient Currently in Pain: No         Therapy Time:   Individual Concurrent Group Co-treatment   Time In 1410         Time Out 1425         Minutes 15               George SPRINGER A.CCC/SLP     6/16/2021, 2:38 PM

## 2021-06-17 ENCOUNTER — HOSPITAL ENCOUNTER (OUTPATIENT)
Dept: PHYSICAL THERAPY | Age: 61
Setting detail: THERAPIES SERIES
Discharge: HOME | End: 2021-06-17
Payer: COMMERCIAL

## 2021-06-22 ENCOUNTER — APPOINTMENT (OUTPATIENT)
Dept: PHYSICAL THERAPY | Age: 61
End: 2021-06-22
Payer: COMMERCIAL

## 2021-06-22 ENCOUNTER — HOSPITAL ENCOUNTER (OUTPATIENT)
Dept: PHYSICAL THERAPY | Age: 61
Setting detail: THERAPIES SERIES
End: 2021-06-22
Payer: COMMERCIAL

## 2021-06-22 NOTE — FLOWSHEET NOTE
[] [address]  [place]: [phone]   F: [phone]     Physical Therapy Cancel/No Show note    Date: 2021  Patient: Elo Yun  : 1960  MRN: [id number]    Visit Count:   Cancels/No Shows to date: 3/0    For today's appointment patient:    [x]  Cancelled    [] Rescheduled appointment    [] No-show     Reason given by patient:    [x]  Patient ill- diarrhea     []  Conflicting appointment    [] No transportation      [] Conflict with work    [] No reason given    [] Weather related    [] [id number]    [] Other:    Comments:        [x] Next appointment was confirmed    Electronically signed by: Erica Ott PTA    Cosigned by: Mercy Chambers PT, DPT   #[id number]

## 2021-06-24 ENCOUNTER — HOSPITAL ENCOUNTER (OUTPATIENT)
Dept: PHYSICAL THERAPY | Age: 61
Setting detail: THERAPIES SERIES
Discharge: HOME | End: 2021-06-24
Payer: COMMERCIAL

## 2021-06-24 PROCEDURE — 97113 AQUATIC THERAPY/EXERCISES: CPT

## 2021-06-24 NOTE — FLOWSHEET NOTE
[address] [facility]   [address] [facility] [address]   Phone: [phone]   Fax: [phone]    Physical Therapy Daily Treatment Note      Date:  2021  Patient Name:  Jade Nobles    :  1960  MRN: [id number]  Physician:Shellie Payne CNP                                  Insurance: [employer] [place]   Medical Diagnosis: Right /left leg pain              Rehab Codes: M79.604,M79.605, M25.532  Onset Date:  11/15/2018    Next 's appt.: 21  Visit Count: 10/12                Cancel/No Show: 3/0    Subjective:  States feet remain numb/limited sensation. States she was ill and missed some appts ; just feeling weak    Pain:  [x] Yes  [] No Location: lower back and (B) feet Pain Rating: (0-10 scale) 5/10  Pain altered Tx:  [x] No  [] Yes  Action:  Comments:      Objective:  Modalities:   Precautions:  Exercises:      [address] [facility] Exercise Log  Aquatic, Hip & DLS Program- Phase 1    Date of Eval:                                Primary PT: José Luis Stearns PT  Diagnosis: (B) LE pain; R Wrist Pain  Things to Focus On (goals):   Surgical Precautions: Dec. 2020 Lumbar Fusion; 2020 Thoracic Fusion  Medical Precautions: DM  [] C-9 dates  [] Occ Med   [] Medicare       Date 6/3/21 6/9/21 6/10/21 6/15/21 6/24/21   Visit # 6/12 7/12 8/12 9/12 10/12   Walk F/L/R 2 Laps @ Rail 2 Laps @ Rail 2 Laps @ Rail 2 Laps @ RAil 2 Laps @ Rail    2 Laps @ Rail 2 Laps @  [address] @ [address] @  [address] @ [address] 10x5\" 12x5\" 12x5\" 12x5\" 15x5\"   Step-Ups F/L   Low 10x Low 12x Low 15x   Step Down F/L        Heel-toe raises 10x 12x 12x 12x 15x   SLR F/L/R 10x 12x 12x 12x 15x   Hip/Knee Flex/Ext 10x 12x 12x 12x 15x   F/L Lunges                Kickboard Ex.  Medium Med Med Med Med   Iso Abd. 10x5\" 10x5\" 10x5\" 12x5\" 12x5\"   Push-pull 10x 10x 10x 12x 12x   Paddling   10x 12x 12x           UE Format:        Horiz Abd/Add 10x 12x 12x 12x 15x   IR/ER (wipers) 10x 12x 12x 12x 15x   Alt Flex/Ext 10x 12x 12x 12x 15x   Alt Press Down  12x 12x 12x 15x   Abd/Add 10x 12x 12x 12x 15x           Deep Water: 1 Noodle 1 Noodle 1 Noodle 1 Noodle 1 Noodle   Hang 5' 3' 3' 3' 3'   Cycling 1' 2' 2' 2' 2'   Jacks 1' 1' 1' 2 2'   X-Country 1' 1' 1' 2' 2'           Balance        SLS                Stretches        Achllies 2x20\" 2x20\" 2x20\" 2x20\" 2x20\"   Hamstring  2x20\" 2x20\" 2x20\" 2x20\"           Cool Down 2 laps 1 Lap 2 Laps 2 Laps 2 Laps   Pain Rating 2-3 7 6 8 5       Specific Instructions for next treatment:     Assessment: [x] Progressing toward goals. Increased reps to tolerance- emphasis on posture/technique. Patient tolerated well- no increased pain throughout treatment      [x] Patient would continue to benefit from skilled physical therapy services in order to address strength, improve pain, and increase activity tolerance to improve community level mobility and reach her goal of being able to go shopping with family.      STG: (to be met in 6 treatments)-GOALS ADDRESSED BY EVALUATING PT 6/3/21  1. ? Pain: Lesson pain to 2/10.        6/3/2021: Partially met -- pain is fluctuating now and less intense 5/10 this date compared to 7/10 at [facility]   2. ? ROM: lumbar AAROM into extension 0 % loss.        6/3/2021: Ongoing goal -- still very limited into extension   3. ? Strength: increase strength into extension to 3/5.    6/3/2021: MODIFY GOAL: Pt will be able to perform full leg extension in bent over position to increase multifidus strength to support joint to increase tolerance to upright activity   4. ? Function: OPTIMAL score of 9/3.   6/3/2021: Progressing -- OPTIMAL score 10/3 for carrying, walking, bending   5. Independent with Home Exercise Programs  6/3/2021: MET -- pt poses no questions   6.  Demonstrate Knowledge of fall prevention  6/3/2021: MET - pt notes strategies but has not implemented   LTG: (to be met in 12 treatments)  1. OPTIMAL score at the time of discharge of 6/3.    2. ADDED: Pt will be able to tolerate >10 minutes of ambulation with LRAD per subjective report with no greater than 3 point increase in pain to progress to optimized community mobility. 3. ADDED: Pt will be able to carry a light object (<10 lbs) without an increase in pain to show improved tolerance to household duties. 4. ADDED: Pt will be educated on community resources for aquatic community wellness program to carry over relief pt gets from PT sessions to continue to remain active. Pt. Education:  [x] Yes  [] No  [x] Reviewed Prior HEP/Ed  Method of Education: [x] Verbal  [x] Demo  [] Written  Comprehension of Education:  [x] Verbalizes understanding. [x] Demonstrates understanding. [] Needs review. [] Demonstrates/verbalizes HEP/Ed previously given. Plan: [x] Continue per plan of care.  2 visits remains- then re-eval   [] Other:      Treatment Charges: Mins Units   []  Modalities     []  Ther Exercise     []  Manual Therapy     []  Ther Activities     [x]  Aquatics 40 3   []  Neuromuscular     [] Vasocompression     [] Gait Training     [] Dry needling        [] 1 or 2 muscles        [] 3 or more muscles     []  Other     Total Treatment time 40 3     Time In: 145 PM       Time Out: 235 PM    Electronically signed by:  Anel Meeks PTA

## 2021-06-29 ENCOUNTER — HOSPITAL ENCOUNTER (OUTPATIENT)
Dept: OCCUPATIONAL THERAPY | Age: 61
Setting detail: THERAPIES SERIES
Discharge: HOME | End: 2021-06-29
Payer: COMMERCIAL

## 2021-06-29 PROCEDURE — 97535 SELF CARE MNGMENT TRAINING: CPT

## 2021-06-29 NOTE — FLOWSHEET NOTE
TREATMENT LOCATION:   [] [facility]   [facility] [place] [address]: [phone]  F: [phone] [x] [address]: [phone]  F: [phone]      Lymphedema Services - Initial Evaluation for Lower Extremity    Date:  2021  Patient: Pedro Luis Van  : 1960             MRN: [id number]  Referring Physician: AFSANEH Stevenson       Phone: [phone]  Fax: [phone]  Insurance: [facility] [employer] ( [id number]) - No Copay, 40 visit limit combined with PT/OT, No Preauth required. # of visits allowed/remainin  Medical Diagnosis: Lymphedema   Rehab Codes: I89.0   Onset Date: 21  Visit# / total visits:     POC UPDATE AT VISIT: 10    Allergies:  [x] Adhesive tape    [x] Medications    Medications: See charted information in Epic  Past Medical History: See charted information in Epic     Restrictions: None  Fall Risk:   [x] No    [] Yes   If yes, intervention:       Overview: Patient is a 59year old female referred to the Lymphedema Clinic with a diagnosis of bilateral Lower Extremity Lymphedema. Pt arrives to today's treatment with her sister. She was previously seen at [facility] lymphedema clinic aprox 3 years ago. During this time she was set up with velcro devices and a BIOTAB vasopneumatic pump. She states that she stopped using the devices about 2 years ago as she started going through several back surgeries, therapy and trying to manage her health better. Overall life has happened and she would like to get the swelling back under control so she can become more active and more independent again. Pt feels that she is holding the fluid in her abdomen now as well as the distal lower extremity.      Pt starting aquatic therapy     Pain:  [] YES    [x] NO   Location: 0     Pain Rating: ( 0-10 scale) : 0/10  Comments:     History of Cancer: []Yes [x]No      Absolute Lymphedema Contraindications - treatement [] NONE     [x] CHF (only if patient is un-medicated or edema is solely d/t cardiac failure)    [x] DVT- Acute, No MLD to limb    ( HISTORY OF BLOOD CLOT)    _ Advanced Renal Disease - Need Physician Clearance    Absolute Contraindications Regarding the Deep Abdomen: [x] NONE   Relative Lymphedema Contraindications [x] Age 61+     OBJECTIVE  Presentation of Affected Areas  Location: bilateral Lower Extremity    Description: Dry/scaly skin  Pitting 2+  Gilchrist hump on dorsum of foot  Doughy     Scars No   Wounds None   Sensation Numbness   Additional Comments:        Circumferential Measurements  Measurements taken from nail base of D2 digit. Measurements (cm) Right Left   Dorsum   9 23.0 24.5   Inframalleolar   13       32.0 33.0   Supramalleolar      17 23.5 28.0   Lower Calf 26 27.5 31.0   Mid Calf 33 33.0 37.0   Upper Calf  40 37.0 39.5   Knee   48 37.0 37.0   10 cm above knee   68 49.0 47.0   Thigh-widest     Hip-widest     6/29/21    Initial Total 6/29/2021 :  260.0    266.1 277    281.5        ASSESSMENT: Patient would benefit from skilled occupational therapy services in order to:Decrease edema that has accumulated in the extremity, decrease risk of infection, improve limb ROM, and improve overall quality of life. Pt arrives to today's treatment with TG  in place. Pt states that she has been trying to wear these daily. Pt last is educated no various compression garments that would be of benefit to her to purchase in the future for better containment of her edema. Pt is agreeable to have info sent for insurance verification of covered benefits. Vasoneumpatic pump education is provided as follow up on home management tool for lymphedema. Topics include frequency of use, duration of use, safety with donning/doffing, using a protectant layer of thin pant or stockinette between skin and pump to maintain skin integrity, and proper positioning for effective fluid flow while using pump.  Pt is also educated on differences between advanced versus basic pump as pt has a basic pump at home. She is also agreeable to have information sent to [facility] for processing of their advance pump. Response to treatment: Pt verbalizes and is agreeable to the instructions/ POC established at today's evaluation. PLAN FOR NEXT VISIT: Initiate CDT, educate risk reduction techniques, Initate bandaging of the L LE        Therapy Goals  STG - To be addressed within 2 visits    Pt will demonstrate compliance of maintaining lymphedema precautions to reduce the risks of infection and further exacerbations. Pt will demonstrate independence with decongestive exercise program in order to expedite fluid rerouting. LTG To be adressed within 7 visits     Pt will demonstrate competence with SELF MLD (Manual lymphatic drainage) in order to reroute lymphatic pathways for decreased swelling. Pt/Caregiver will demonstrate independence with donning/doffing and wearing schedule for compression garments/ devices to maintain decreased size upon discharge. Pt will demonstrate compliance with skin care routine for improved overall skin integrity and decreased risk of wounds and infection. Pt to compliant with CDT in order to reduce edema in the L LE by 5+ cm and the R LE by 2+ cm . Patient's Goal: I would like to get rid of all this fluid in me. Response to Education Provided:  [x] Verbalized understanding   [x] Demonstrates/verbalizes HEP/Education previously given      [x] Needs continued review            [] No understanding   Learner(s): [x] Patient  [] Spouse [x] Family  [] Other:   Method(s): [x] Verbal [] Demonstration [x] Handouts [] Exercise booklet   Family available to assist with home program if needed: [x] Yes [] No    FREQUENCY: Pt will be seen 1 x/ week for 10 visits and will follow up as appropriate. Focus is to remain on short and long term goals listed above.              Treatment Charges

## 2021-06-30 ENCOUNTER — HOSPITAL ENCOUNTER (OUTPATIENT)
Dept: PHYSICAL THERAPY | Age: 61
Setting detail: THERAPIES SERIES
Discharge: HOME | End: 2021-06-30
Payer: COMMERCIAL

## 2021-06-30 PROCEDURE — 97113 AQUATIC THERAPY/EXERCISES: CPT

## 2021-06-30 NOTE — FLOWSHEET NOTE
[facility]   [address] [facility] [address]   Phone: [phone]   Fax: [phone]    Physical Therapy Daily Treatment Note    Date:  2021  Patient Name:  Maria Elena Basurto    :  1960  MRN: [id number]  Physician:Shellie Payne CNP                                  Insurance: [employer] [place]   Medical Diagnosis: Right /left leg pain              Rehab Codes: M79.604,M79.605, M25.532  Onset Date:  11/15/2018    Next 's appt.: 21  Visit Count:                 Cancel/No Show: 3/0    Subjective:  States decreased pain for a few hours after therapy but pain returns. Notes     Pain:  [x] Yes  [] No Location: lower back and (B) feet Pain Rating: (0-10 scale) 4/10  Pain altered Tx:  [x] No  [] Yes  Action:  Comments:      Objective:  Modalities:   Precautions:  Exercises:      [address] [facility] Exercise Log  Aquatic, Hip & DLS Program- Phase 1    Date of Eval:                                Primary PT: Paulino Mak PT  Diagnosis: (B) LE pain; R Wrist Pain  Things to Focus On (goals):   Surgical Precautions: Dec. 2020 Lumbar Fusion; 2020 Thoracic Fusion  Medical Precautions: DM  [] C-9 dates  [] Occ Med   [] Medicare       Date 6/15/21 6/24/21 6/30/21    Visit # 12 10/12 11/12        Low   Walk F/L/R 2 Laps @ RAil 2 Laps @ Rail 2 Laps @ [employer] 2 Laps @ Rail 2 Laps @ Rail 2 Laps @ [facility] 12x5\" 15x5\" 15x5\"    Step-Ups F/L Low 12x Low 15x Low 15x    Step Down F/L       Heel-toe raises 12x 15x 15x    SLR F/L/R 12x 15x 15x    Hip/Knee Flex/Ext 12x 15x 15x    F/L Lunges              Kickboard Ex.  Med Med Med    Iso Abd. 12x5\" 12x5\" 12x5\"    Push-pull 12x 12x 12x    Paddling 12x 12x 12x           UE Format:       Horiz Abd/Add 12x 15x 15x    IR/ER (wipers) 12x 15x 15x    Alt Flex/Ext 12x 15x 15x    Alt Press Down 12x 15x 15x    Abd/Add 12x 15x 15x           Deep Water: 1 Noodle 1 Noodle 1 Noodle    Hang 3' 3' 3' Cycling 2' 2' 3'    Jacks 2 2' 3'    X-Country 2' 2' 3'           Balance       SLS              Stretches       Achllies 2x20\" 2x20\" 2x20\"    Hamstring 2x20\" 2x20\" 2x20\"           Cool Down 2 Laps 2 Laps 2 Laps    Pain Rating 8 5 4        Specific Instructions for next treatment: progress WB to low box for all LE exercises. Assessment: [x] Progressing toward goals. Tolerated all exercises well today with decreased pain in (B) LE at end of treatment. Denies any increased pain and notes better balance/stability today compared to last visit. Increased deep water aerobic exercise time for endurance. [x] Patient would continue to benefit from skilled physical therapy services in order to address strength, improve pain, and increase activity tolerance to improve community level mobility and reach her goal of being able to go shopping with family.      STG: (to be met in 6 treatments)-GOALS ADDRESSED BY EVALUATING PT 6/3/21  1. ? Pain: Lesson pain to 2/10.        6/3/2021: Partially met -- pain is fluctuating now and less intense 5/10 this date compared to 7/10 at [facility]   2. ? ROM: lumbar AAROM into extension 0 % loss.        6/3/2021: Ongoing goal -- still very limited into extension   3. ? Strength: increase strength into extension to 3/5.    6/3/2021: MODIFY GOAL: Pt will be able to perform full leg extension in bent over position to increase multifidus strength to support joint to increase tolerance to upright activity   4. ? Function: OPTIMAL score of 9/3.   6/3/2021: Progressing -- OPTIMAL score 10/3 for carrying, walking, bending   5. Independent with Home Exercise Programs  6/3/2021: MET -- pt poses no questions   6.  Demonstrate Knowledge of fall prevention  6/3/2021: MET - pt notes strategies but has not implemented   LTG: (to be met in 12 treatments)  1. OPTIMAL score at the time of discharge of 6/3.    2. ADDED: Pt will be able to tolerate >10 minutes of ambulation with LRAD per subjective report

## 2021-07-01 ENCOUNTER — HOSPITAL ENCOUNTER (OUTPATIENT)
Dept: OCCUPATIONAL THERAPY | Age: 61
Setting detail: THERAPIES SERIES
Discharge: HOME | End: 2021-07-01
Payer: COMMERCIAL

## 2021-07-01 PROCEDURE — 97535 SELF CARE MNGMENT TRAINING: CPT

## 2021-07-01 NOTE — FLOWSHEET NOTE
TREATMENT LOCATION:   [] [facility]   [facility] [place]: [phone]  F: [phone] [x] [address]: [phone]  F: [phone]      Lymphedema Services - Treatment Note for Lower Extremity    Date:  2021  Patient: Jason Diallo  : 1960             MRN: [id number]  Referring Physician: AFSANEH Nogueira       Phone: [phone]  Fax: [phone]  Insurance: [employer] ( [id number]) - No Copay, 40 visit limit combined with PT/OT, No Preauth required. # of visits allowed/remainin  Medical Diagnosis: Lymphedema   Rehab Codes: I89.0   Onset Date: 21  Visit# / total visits: 3/7    POC UPDATE AT VISIT: 10    Allergies:  [x] Adhesive tape    [x] Medications    Medications: See charted information in Epic  Past Medical History: See charted information in Epic     Restrictions: None  Fall Risk:   [x] No    [] Yes   If yes, intervention:       Overview: Patient is a 59year old female referred to the Lymphedema Clinic with a diagnosis of bilateral Lower Extremity Lymphedema. Pt arrives to today's treatment with tg  in place. She states that everything is about the same and nothing is different. She did go to the pool yesterday     Pt arrives to today's treatment with her sister. She was previously seen at [facility] [address] lymphedema clinic aprox 3 years ago. During this time she was set up with velcro devices and a BIOTAB vasopneumatic pump. She states that she stopped using the devices about 2 years ago as she started going through several back surgeries, therapy and trying to manage her health better. Overall life has happened and she would like to get the swelling back under control so she can become more active and more independent again. Pt feels that she is holding the fluid in her abdomen now as well as the distal lower extremity.      Pt starting aquatic therapy     Pain:  [] YES    [x] NO Location: 0     Pain Rating: ( 0-10 scale) : 0/10  Comments:     History of Cancer: []Yes [x]No      Absolute Lymphedema Contraindications - treatement [] NONE     [x] CHF (only if patient is un-medicated or edema is solely d/t cardiac failure)    [x] DVT- Acute, No MLD to limb    ( HISTORY OF BLOOD CLOT)    _ Advanced Renal Disease - Need Physician Clearance    Absolute Contraindications Regarding the Deep Abdomen: [x] NONE   Relative Lymphedema Contraindications [x] Age 61+     OBJECTIVE  Presentation of Affected Areas  Location: bilateral Lower Extremity    Description: Dry/scaly skin  Pitting 2+  [place] hump on dorsum of foot  Doughy     Scars No   Wounds None   Sensation Numbness   Additional Comments:        Circumferential Measurements  Measurements taken from nail base of D2 digit. Measurements (cm) Right Left   Dorsum   9 23.0 24.5   Inframalleolar   13       32.0 33.0   Supramalleolar      17 23.5 28.0   Lower Calf 26 27.5 31.0   Mid Calf 33 33.0 37.0   Upper Calf  40 37.0 39.5   Knee   48 37.0 37.0   10 cm above knee   68 49.0 47.0   Thigh-widest     Hip-widest     6/29/21    Initial Total 7/1/2021 :  260.0    266.1 277    281.5        ASSESSMENT: Patient would benefit from skilled occupational therapy services in order to:Decrease edema that has accumulated in the extremity, decrease risk of infection, improve limb ROM, and improve overall quality of life. Pt arrives to today's treatment with TG  in place. Pt states that she has been trying to wear these daily but notes that the LE increases in size through out the day, even though she has them on. She also continues to use her pump daily and will notice a nice reduction in size, however it does not last long. Pt follows up on various compression garments that would be of benefit to her to purchase in the future for better containment of her edema. Pt does not have in-network insurance coverage with the companies that Elena Dial was submitted to.

## 2021-07-06 ENCOUNTER — HOSPITAL ENCOUNTER (OUTPATIENT)
Dept: OCCUPATIONAL THERAPY | Age: 61
Setting detail: THERAPIES SERIES
Discharge: HOME | End: 2021-07-06
Payer: COMMERCIAL

## 2021-07-06 PROCEDURE — 97535 SELF CARE MNGMENT TRAINING: CPT

## 2021-07-06 NOTE — FLOWSHEET NOTE
TREATMENT LOCATION:   [] [address]   [facility] [place] [address]: [phone]  F: [phone] [x] [address]: [phone]  F: [phone]      Lymphedema Services - Treatment Note for Lower Extremity    Date:  2021  Patient: Ag Maria  : 1960             MRN: [id number]  Referring Physician: AFSANEH Clark*       Phone: [phone]  Fax: [phone]  Insurance: Jadon Muse 4575 ( [id number]) - No Copay, 40 visit limit combined with PT/OT, No Preauth required. # of visits allowed/remainin  Medical Diagnosis: Lymphedema   Rehab Codes: I89.0   Onset Date: 21  Visit# / total visits: 3/7    POC UPDATE AT VISIT: 10    Allergies:  [x] Adhesive tape    [x] Medications    Medications: See charted information in Epic  Past Medical History: See charted information in Epic     Restrictions: None  Fall Risk:   [x] No    [] Yes   If yes, intervention:       Pain:  [] YES    [x] NO   Location: 0     Pain Rating: ( 0-10 scale) : 0/10  Comments:     History of Cancer: []Yes [x]No      Absolute Lymphedema Contraindications - treatement [] NONE     [x] CHF (only if patient is un-medicated or edema is solely d/t cardiac failure)    [x] DVT- Acute, No MLD to limb    ( HISTORY OF BLOOD CLOT)    _ Advanced Renal Disease - Need Physician Clearance    Absolute Contraindications Regarding the Deep Abdomen: [x] NONE   Relative Lymphedema Contraindications [x] Age 61+     OBJECTIVE  Presentation of Affected Areas  Location: bilateral Lower Extremity    Description: Dry/scaly skin  Pitting 2+  [place] hump on dorsum of foot  Doughy     Scars No   Wounds None   Sensation Numbness   Additional Comments:        Circumferential Measurements  Measurements taken from nail base of D2 digit.   Measurements (cm) Right Left   Dorsum   9 23.0 25.0   Inframalleolar   13       32.0 33.5   Supramalleolar      17 23.5 29.5   Lower Calf 26 27.5 34.5   Mid Calf 33 33.0 39.0   Upper Calf  40 37.0 40.8   Knee   48 37.0 37.0   10 cm above knee   68 49.0 49.0   Thigh-widest     Hip-widest     7/6/21 6/29/21    Initial Total 7/6/2021 :  X    260.0    266.1 288. 3    277    281.5        ASSESSMENT: Patient would benefit from skilled occupational therapy services in order to:Decrease edema that has accumulated in the extremity, decrease risk of infection, improve limb ROM, and improve overall quality of life. Pt arrives to today's treatment with TG  in place. Pt states that she has been trying to wear these daily but notes that the LE increases in size through out the day, even though she has them on. She also continues to use her pump daily and will notice a nice reduction in size, however it does not last long. Pt received the following order and brings it with her to today's session -  Compreflex Transition Calf, Size Medium Tall - Order Number [id number]. OT spends extra time trialing the donning and doffing of the device. Pt notes independence and states good understanding on use, wearing schedule, and care for the garment. She states she has no other questions prior to leaving. Pt continued to notes that she is holding fluid in her abdomen as well as the distal lower extremity. Pt started aquatic therapy and feels that it is helping her build strength and endurance. Pump vendor missed today's apt - rescheduled trail for Thursday of which pt is agreeable. Response to treatment: Pt verbalizes and is agreeable to the instructions/ POC established at today's evaluation. PLAN FOR NEXT VISIT: Initiate CDT, educate risk reduction techniques, Initate bandaging of the L LE        Therapy Goals  STG - To be addressed within 2 visits    Pt will demonstrate compliance of maintaining lymphedema precautions to reduce the risks of infection and further exacerbations.     Pt will demonstrate independence with decongestive exercise program in order to expedite fluid rerouting. LTG To be adressed within 7 visits     Pt will demonstrate competence with SELF MLD (Manual lymphatic drainage) in order to reroute lymphatic pathways for decreased swelling. Pt/Caregiver will demonstrate independence with donning/doffing and wearing schedule for compression garments/ devices to maintain decreased size upon discharge. Pt will demonstrate compliance with skin care routine for improved overall skin integrity and decreased risk of wounds and infection. Pt to compliant with CDT in order to reduce edema in the L LE by 5+ cm and the R LE by 2+ cm . Patient's Goal: I would like to get rid of all this fluid in me. Response to Education Provided:  [x] Verbalized understanding   [x] Demonstrates/verbalizes HEP/Education previously given      [x] Needs continued review            [] No understanding   Learner(s): [x] Patient  [] Spouse [x] Family  [] Other:   Method(s): [x] Verbal [] Demonstration [x] Handouts [] Exercise booklet   Family available to assist with home program if needed: [x] Yes [] No    FREQUENCY: Pt will be seen 1 x/ week for 10 visits and will follow up as appropriate. Focus is to remain on short and long term goals listed above. Treatment Charges   Minutes   Units   Evaluation (85094)            Low            Moderate            High     Manual Therapy (03766): Therapeutic activities (91867): Therapeutic Exercise (67858)     Self care/home mgmt (78823) 65 4   Other:      Total Treatment Time    65 4       Time In: 2:40  Time Out: 3:45      Electronically signed by José Miguel Schmitt OT on 7/6/2021 at 2:45 PM

## 2021-07-07 ENCOUNTER — HOSPITAL ENCOUNTER (OUTPATIENT)
Dept: PHYSICAL THERAPY | Age: 61
Setting detail: THERAPIES SERIES
Discharge: HOME | End: 2021-07-07
Payer: COMMERCIAL

## 2021-07-07 NOTE — FLOWSHEET NOTE
[x] [facility]) - [facility] & Therapy  [address]  [place]: [phone]   F: [phone]     Physical Therapy Cancel/No Show note    Date: 2021  Patient: Everette Meckel  : 1960  MRN: [id number]    Visit Count:   Cancels/No Shows to date:     For today's appointment patient:    [x]  Cancelled    [] Rescheduled appointment    [] No-show     Reason given by patient:    []  Patient ill    []  Conflicting appointment    [] No transportation      [] Conflict with work    [] No reason given    [] Weather related    [] COVID-19    [x] Other:      Comments:  Saw her doctor yesterday and has to \"wear a brace for 24 hours\" and is unable to come in to the pool today.       [] Next appointment was confirmed    Electronically signed by: Wannetta Dandy, PTA

## 2021-07-08 ENCOUNTER — HOSPITAL ENCOUNTER (OUTPATIENT)
Dept: OCCUPATIONAL THERAPY | Age: 61
Setting detail: THERAPIES SERIES
Discharge: HOME | End: 2021-07-08
Payer: COMMERCIAL

## 2021-07-08 PROCEDURE — 97535 SELF CARE MNGMENT TRAINING: CPT

## 2021-07-08 PROCEDURE — 97016 VASOPNEUMATIC DEVICE THERAPY: CPT

## 2021-07-08 NOTE — FLOWSHEET NOTE
TREATMENT LOCATION:   [] [address]   [facility] [place] [address]: [phone]  F: [phone] [x] [address]: [phone]  F: [phone]      Lymphedema Services - Treatment Note for Lower Extremity    Date:  2021  Patient: Celestino Peterson  : 1960             MRN: [id number]  Referring Physician: AFSANEH Esposito*       Phone: [phone]  Fax: [phone]  Insurance: Shun Delatorre ( [id number]) - No Copay, 40 visit limit combined with PT/OT, No Preauth required. # of visits allowed/remainin  Medical Diagnosis: Lymphedema   Rehab Codes: I89.0   Onset Date: 21  Visit# / total visits:     POC UPDATE AT VISIT: 10    Allergies:  [x] Adhesive tape    [x] Medications    Medications: See charted information in Epic  Past Medical History: See charted information in Epic     Restrictions: None  Fall Risk:   [x] No    [] Yes   If yes, intervention:       Pain:  [] YES    [x] NO   Location: 0     Pain Rating: ( 0-10 scale) : 0/10  Comments:     History of Cancer: []Yes [x]No      Absolute Lymphedema Contraindications - treatement [] NONE     [x] CHF (only if patient is un-medicated or edema is solely d/t cardiac failure)    [x] DVT- Acute, No MLD to limb    ( HISTORY OF BLOOD CLOT)    _ Advanced Renal Disease - Need Physician Clearance    Absolute Contraindications Regarding the Deep Abdomen: [x] NONE   Relative Lymphedema Contraindications [x] Age 61+     OBJECTIVE  Presentation of Affected Areas  Location: bilateral Lower Extremity    Description: Dry/scaly skin  Pitting 2+  [place] hump on dorsum of foot  Doughy     Scars No   Wounds None   Sensation Numbness   Additional Comments:        Circumferential Measurements  Measurements taken from nail base of D2 digit.   Measurements (cm) Right Left   Dorsum   9 23.5 24.0   Inframalleolar   13       32.4 33.3   Supramalleolar      17 25.0 26.5   Lower Calf 26 28.5 28.5   Mid Calf 33 34.0 34.7   Upper Calf  40 37.5 38.2   Knee   48 36.5 35.4   10 cm above knee   68 48.5 47.7   Thigh-widest     Hip-widest     7/8/21 7/6/21 6/29/21    Initial Total  :  265.9    X    260.0    266.1 268.3    288. 3    277    281.5        ASSESSMENT: Patient would benefit from skilled occupational therapy services in order to:Decrease edema that has accumulated in the extremity, decrease risk of infection, improve limb ROM, and improve overall quality of life. Pt arrives to today's treatment with TG  in place on the R LE and the velcro device on the L LE. Pt states that she has been waering these daily along with her pump use daily. Pt states that she has noticed the last several days she is urinating more than she usually does. Pt is educated on risk and precautions of pumping fluids to fast to the heart of which she is also educated on what to do if this happens ( by removing the devices and contacting her cardiologist if needed). Pt trials the advanced pump today on normal pressure x30+  minutes to L LE to aid in reduction of edema. During pump trial, education is provided on use of vasopneumatic pump as home management tool for lymphedema. Topics include frequency of use, duration of use, safety with donning/doffing, using a protectant layer of thin pant or stockinette between skin and pump to maintain skin integrity, and proper positioning for effective fluid flow while using pump. Pt is also educated on differences between advanced versus basic pump. Patient presents with stage III lymphedema of the B lower extremities. Despite compliance with conservative treatments including: compression wrapping/20-30 mmhg compressions garments, regular diet, self-MLD, , elevation, and exercise for at least the past 2 years. In addition, the basic pump has been used daily over the past 2 years, which has now caused abdominal swelling and fluid in the truncal region.   In order to decrease symptoms and improve quality of life the advanced vasopneumatic compression pump is being recommended. This pump will safely and comfortably improve lymphatic flow in both the trunk and bilateral lower extremities. The basic pump should be discontinued at this time due to ineffectiveness. Response to treatment: Pt verbalizes and is agreeable to the instructions/ POC established at today's evaluation. PLAN FOR NEXT VISIT: Initiate CDT, educate risk reduction techniques, follow up on compression use    Therapy Goals  STG - To be addressed within 2 visits    Pt will demonstrate compliance of maintaining lymphedema precautions to reduce the risks of infection and further exacerbations. Pt will demonstrate independence with decongestive exercise program in order to expedite fluid rerouting. LTG To be adressed within 7 visits     Pt will demonstrate competence with SELF MLD (Manual lymphatic drainage) in order to reroute lymphatic pathways for decreased swelling. Pt/Caregiver will demonstrate independence with donning/doffing and wearing schedule for compression garments/ devices to maintain decreased size upon discharge. Pt will demonstrate compliance with skin care routine for improved overall skin integrity and decreased risk of wounds and infection. Pt to compliant with CDT in order to reduce edema in the L LE by 5+ cm and the R LE by 2+ cm . Patient's Goal: I would like to get rid of all this fluid in me. Response to Education Provided:  [x] Verbalized understanding   [x] Demonstrates/verbalizes HEP/Education previously given      [x] Needs continued review            [] No understanding   Learner(s): [x] Patient  [] Spouse [x] Family  [] Other:   Method(s): [x] Verbal [] Demonstration [x] Handouts [] Exercise booklet   Family available to assist with home program if needed: [x] Yes [] No    FREQUENCY: Pt will be seen 1 x/ week for 10 visits and will follow up as appropriate. Focus is to remain on short and long term goals listed above. Treatment Charges   Minutes   Units   Evaluation (72288)            Low            Moderate            High     Manual Therapy (93566): Therapeutic activities (49421):       Therapeutic Exercise (92488)     Self care/home mgmt (03387) 85 6   Other: 15 1   Total Treatment Time    85 7       Time In: 3:20  Time Out: 4:45      Electronically signed by Maria Luisa Hernandez OT on 7/8/2021 at 3:28 PM

## 2021-07-13 ENCOUNTER — HOSPITAL ENCOUNTER (OUTPATIENT)
Dept: OCCUPATIONAL THERAPY | Age: 61
Setting detail: THERAPIES SERIES
Discharge: HOME | End: 2021-07-13
Payer: COMMERCIAL

## 2021-07-13 PROCEDURE — 97535 SELF CARE MNGMENT TRAINING: CPT

## 2021-07-13 NOTE — FLOWSHEET NOTE
TREATMENT LOCATION:   [] [address]   [facility] [place] [address]: [phone]  F: [phone] [x] [address]: [phone]  F: [phone]      Lymphedema Services - Treatment Note for Lower Extremity    Date:  2021  Patient: Abiola Dangelo  : 1960             MRN: [id number]  Referring Physician: AFSANEH Maria       Phone: [phone]  Fax: [phone]  Ins[facility]: [facility] ( [id number]) - No Copay, 40 visit limit combined with PT/OT, No Preauth required. # of visits allowed/remainin  Medical Diagnosis: Lymphedema   Rehab Codes: I89.0   Onset Date: 21  Visit# / total visits:     POC UPDATE AT VISIT: 10    Allergies:  [x] Adhesive tape    [x] Medications    Medications: See charted information in Epic  Past Medical History: See charted information in Epic     Restrictions: None  Fall Risk:   [x] No    [] Yes   If yes, intervention:       Pain:  [] YES    [x] NO   Location: 0     Pain Rating: ( 0-10 scale) : 0/10  Comments: Back pain only. History of Cancer: []Yes [x]No      Absolute Lymphedema Contraindications - treatement [] NONE     [x] CHF (only if patient is un-medicated or edema is solely d/t cardiac failure)    [x] DVT- Acute, No MLD to limb    ( HISTORY OF BLOOD CLOT)    _ Advanced Renal Disease - Need Physician Clearance    Absolute Contraindications Regarding the Deep Abdomen: [x] NONE   Relative Lymphedema Contraindications [x] Age 61+     OBJECTIVE  Presentation of Affected Areas  Location: bilateral Lower Extremity    Description: Dry/scaly skin  Pitting 2+  [place] hump on dorsum of foot  Doughy     Scars No   Wounds None   Sensation Numbness   Additional Comments:        Circumferential Measurements  Measurements taken from nail base of D2 digit.   Measurements (cm) Right Left   Dorsum   9 23.5 25.0   Inframalleolar   13       32.4 33.0   Supramalleolar      17 24.3 25.2 Lower Calf 26 31.7 28.5   Mid Calf 33 36.5 35.2   Upper Calf  40 37.5 37.0   Knee   48 38.2 37.5   10 cm above knee   68 49.5 51.0   Thigh-widest     Hip-widest     7/13/21 7/8/21 7/6/21 6/29/21    Initial Total  :  273.6    265.9    X    260.0    266.1 272.4    268.3    288. 3    277    281.5        ASSESSMENT: Patient would benefit from skilled occupational therapy services in order to:Decrease edema that has accumulated in the extremity, decrease risk of infection, improve limb ROM, and improve overall quality of life. Pt arrives to today's treatment with TG  in place on the R LE and the velcro device on the L LE. Pt states that she has been waering these daily along with her pump use daily, mostly to the R LE and then also to the L LE when the leg increases in size. Pt is reminded on the risk and precautions of pumping fluids to fast to the heart of which she is also educated on what to do if this happens ( by removing the devices and contacting her cardiologist if needed). She states that she has not noticed anything unusual and is starting to feel a little better, compared to the previous day she was here. OT follows up pt wanting a second devices for the R LE as it continues to increase in size. Pt states that she does. Measurements are taken and OT assist in ordering of the device off of [employer]. It is as follows: medium, tall, black [facility] Order number: [id number]. Pt states good understanding on how to don the device with reminded instructions. Pt to follow up in 2 weeks for confirmation of use. Session shortened today as pt states she feels comfortable with all exercises. Will follow up at next session for review. Response to treatment: Pt verbalizes and is agreeable to the instructions/ POC established at today's evaluation.         PLAN FOR NEXT VISIT: Initiate CDT, educate risk reduction techniques, follow up on compression use    Therapy Goals  STG - To be addressed within 2 visits    Pt will demonstrate compliance of maintaining lymphedema precautions to reduce the risks of infection and further exacerbations. Pt will demonstrate independence with decongestive exercise program in order to expedite fluid rerouting. LTG To be adressed within 7 visits     Pt will demonstrate competence with SELF MLD (Manual lymphatic drainage) in order to reroute lymphatic pathways for decreased swelling. Pt/Caregiver will demonstrate independence with donning/doffing and wearing schedule for compression garments/ devices to maintain decreased size upon discharge. Pt will demonstrate compliance with skin care routine for improved overall skin integrity and decreased risk of wounds and infection. Pt to compliant with CDT in order to reduce edema in the L LE by 5+ cm and the R LE by 2+ cm . Patient's Goal: I would like to get rid of all this fluid in me. Response to Education Provided:  [x] Verbalized understanding   [x] Demonstrates/verbalizes HEP/Education previously given      [x] Needs continued review            [] No understanding   Learner(s): [x] Patient  [] Spouse [x] Family  [] Other:   Method(s): [x] Verbal [] Demonstration [x] Handouts [] Exercise booklet   Family available to assist with home program if needed: [x] Yes [] No    FREQUENCY: Pt will be seen 1 x/ week for 10 visits and will follow up as appropriate. Focus is to remain on short and long term goals listed above. Treatment Charges   Minutes   Units   Evaluation (59188)            Low            Moderate            High     Manual Therapy (21955): Therapeutic activities (64204): Therapeutic Exercise (43285)     Self care/home mgmt (32922) 50 3   Other:      Total Treatment Time    50 3       Time In: 8:30  Time Out: 9:20      Electronically signed by Maria Luisa Hernandez OT on 7/13/2021 at 8:34 AM

## 2021-07-14 ENCOUNTER — HOSPITAL ENCOUNTER (OUTPATIENT)
Dept: PHYSICAL THERAPY | Age: 61
Setting detail: THERAPIES SERIES
Discharge: HOME | End: 2021-07-14
Payer: COMMERCIAL

## 2021-07-14 PROCEDURE — 97110 THERAPEUTIC EXERCISES: CPT

## 2021-07-14 PROCEDURE — 97113 AQUATIC THERAPY/EXERCISES: CPT

## 2021-07-14 NOTE — FLOWSHEET NOTE
[address] [facility]   [address]   Phone: [phone]   Fax: [phone]    Physical Therapy Daily Treatment Note-DISCHARGE    Date:  2021  Patient Name:  Mirella Garnica    :  1960  MRN: [id number]  Physician:Shellie Payne CNP                                  Insurance: Medical [place]   Medical Diagnosis: Right /left leg pain              Rehab Codes: M79.604,M79.605, M25.532  Onset Date:  11/15/2018    Next 's appt.: 21  Visit Count:                 Cancel/No Show: 4/0    Subjective: To have neck and back injections - awaiting scheduling. States she has access to a pool for the summer to continue PT program. Reports she is very sore today rom stretching at re-eval and visit with Dr/X-rays    Pain:  [x] Yes  [] No Location: lower back down R posterior thigh above knee Pain Rating: (0-10 scale) 10/10  Pain altered Tx:  [x] No  [] Yes  Action:  Comments:      Objective:  Modalities:   Precautions:  Exercises:      [facility] Exercise Log  Aquatic, Hip & DLS Program- Phase 1    Date of Eval:                                Primary PT: Kristel Torres PT  Diagnosis: (B) LE pain; R Wrist Pain  Things to Focus On (goals):   Surgical Precautions: Dec. 2020 Lumbar Fusion; 2020 Thoracic Fusion  Medical Precautions: DM  [] C-9 dates  [] Occ Med   [] Medicare       Date 6/15/21 6/24/21 6/30/21 7/14/21   Visit # 9/12 10/12 11/12 12/12          Walk F/L/R 2 Laps @ RAil 2 Laps @ Rail 2 Laps @ Rail 2 Laps   Marching 2 Laps @ Rail 2 Laps @ Rail 2 Laps @ Michael [address] 12x5\" 15x5\" 15x5\" 15x5\"   Step-Ups F/L Low 12x Low 15x Low 15x Low 10x   Step Down F/L       Heel-toe raises 12x 15x 15x 15x   SLR F/L/R 12x 15x 15x 15x   Hip/Knee Flex/Ext 12x 15x 15x 15x   F/L Lunges              Kickboard Ex.  Med Med Med Med   Iso Abd. 12x5\" 12x5\" 12x5\" 5x5\"   Push-pull 12x 12x 12x 10x   Paddling 12x 12x 12x 10x          UE Format:       Horiz Abd/Add 12x 15x 15x 15x   IR/ER (wipers) 12x 15x 15x 10x   Alt Flex/Ext 12x 15x 15x 15x   Alt Press Down 12x 15x 15x 15x   Abd/Add 12x 15x 15x 15x          Deep Water: 1 Noodle 1 Noodle 1 Noodle 1 Noodle   Hang 3' 3' 3' 3'   Cycling 2' 2' 3' 2'   Jacks 2 2' 3' 2'   X-Country 2' 2' 3' 2'          Balance       SLS              Stretches       Achllies 2x20\" 2x20\" 2x20\" 2x20\"   Hamstring 2x20\" 2x20\" 2x20\" 2x20\"          Cool Down 2 Laps 2 Laps 2 Laps 2 Laps Breast Stroke   Pain Rating 8 5 4 10       Specific Instructions for next treatment: will perform re-assessment if pt wishes to return to PT after injections. Assessment: [x] Progressing toward goals. Patient wishes to hold PT pending response to injections and to conserve visits. Issued written pool HEP. Emphasis on technique and self progression this date. Please see additional note this date performed by PT for re-assessment and POC change. [x] Patient would continue to benefit from skilled physical therapy services in order to address strength, improve pain, and increase activity tolerance to improve community level mobility and reach her goal of being able to go shopping with family.      STG: (to be met in 6 treatments)-GOALS ADDRESSED BY EVALUATING PT 6/3/21  1. ? Pain: Lesson pain to 2/10.        6/3/2021: Partially met -- pain is fluctuating now and less intense 5/10 this date compared to 7/10 at [facility]   2. ? ROM: lumbar AAROM into extension 0 % loss.        6/3/2021: Ongoing goal -- still very limited into extension   3. ? Strength: increase strength into extension to 3/5.    6/3/2021: MODIFY GOAL: Pt will be able to perform full leg extension in bent over position to increase multifidus strength to support joint to increase tolerance to upright activity   4. ? Function: OPTIMAL score of 9/3.   6/3/2021: Progressing -- OPTIMAL score 10/3 for carrying, walking, bending   5.  Independent with Home Exercise Programs  6/3/2021: MET -- pt poses no questions   6. Demonstrate Knowledge of fall prevention  6/3/2021: MET - pt notes strategies but has not implemented   LTG: (to be met in 12 treatments)  1. OPTIMAL score at the time of discharge of 6/3.    2. ADDED: Pt will be able to tolerate >10 minutes of ambulation with LRAD per subjective report with no greater than 3 point increase in pain to progress to optimized community mobility. 3. ADDED: Pt will be able to carry a light object (<10 lbs) without an increase in pain to show improved tolerance to household duties. 4. ADDED: Pt will be educated on community resources for aquatic community wellness program to carry over relief pt gets from PT sessions to continue to remain active. Pt. Education:  [x] Yes  [] No  [x] Reviewed Prior HEP/Ed  Method of Education: [x] Verbal  [x] Demo  [x] Written  Comprehension of Education:  [x] Verbalizes understanding. [x] Demonstrates understanding. [] Needs review. [] Demonstrates/verbalizes HEP/Ed previously given. Plan: [] Continue per plan of care. [x] Other: hold PT at this time until pt has injections and then follow up     Treatment Charges: Mins Units   []  Modalities     [x]  Ther Exercise 22 1   []  Manual Therapy     []  Ther Activities     [x]  Aquatics 42 3   []  Neuromuscular     [] Vasocompression     [] Gait Training     [] Dry needling        [] 1 or 2 muscles        [] 3 or more muscles     []  Other     Total Treatment time 42+ 22 = 64 3+ 1 = 4     Time In: 225 PM      Time Out: 403 PM  Time for PT assessment with Primary PT Vinnie Lung, PT) prior to aquatic session   Time in: 0284            Time Out: 1420       Total Time: 22 min -- billable for PT education on home program and ensuring pt is understanding of POC   Physical therapy treatment completed today in part by physical therapist assistant (PTA). Treatment times reflect total treatment time combined by both PT and PTA for today's service.     Electronically signed by:  Cailin Beck PTA      Cosigned: Franco Lemus, PT, DPT   #[id number]

## 2021-07-14 NOTE — PROGRESS NOTES
[facility]  [address] [place] [address]. [address]         Phone: [phone]       Fax: [phone]    Physical Therapy Progress Note    Date: 6/3/2021      Patient: Peterson Marte  : 1960  MRN: [id number]   DANE Burris                                  Insurance: [employer] [place]- 40 visit limit PT/OT combined (18 used per count )   Medical Diagnosis: Right /left leg pain                Rehab Codes: M79.604,M79.605, M25.532  Onset Date:  11/15/2018    Next 's appt.: 21  Visit Count:                 Cancel/No Show: 0     Date of initial visit: 2021               Date of last PN: 6/3/21       Subjective:   Pt arrives prior to aquatic therapy session. She notes she is quite sore today. Reports seeing her back doctor Sonam Morales) yesterday who now ordered injections for neck and back. She is unsure when she will get the injections but wishes to hold on PT until she receives them. States she had imaging of the neck and back where she notes she has compression at various points. She notes she is still following the lymphedema clinic and will get compression garment also. Notes she is not having any improvement of back pain since beginning PT. Pain:  [x] Yes  [] No  Location:mid low back  Pain Rating: (0-10 scale) 5/10  Pain altered Tx:  [x] No  [] Yes  Action:  Comments: pain similar to last progress note     Objective:  Test Measurements:    STRENGTH   STRENGTH   ROM     Left Right   Left Right Cervical     C5 Shld Abd     L1-2 Hip Flex 3+/5 3+/5 Flexion     Shld Flexion     Hip Abd 4/5 4/5 Extension     Shld IR     L3-4 Knee Ext 4/5 4/5 Rotation L R   Shld ER     L4 Ankle DF 4/5 4/5 Sidebend L R   C6 Elb Flex     L5 EHL 3/5 3/5  Retraction     C7 Elb Ext     S1 Plant.  Flex 4/5 3+/5 Lumbar     C8 EPL     Abdominals     Flexion 4' from ankle but unable to arise to full standing d/t pressure type pain   T1 Fing Abd     Erector Spinae 2/5 2/5 Extension          Hamstrings  3+/5  3+/5 Rotation L: more limited than to R; pressure throughout spine R: 50% loss          multifidus: limited in testing due to pain 7/14  3/5  3/5 Sidebend L: more tightness to L side  R               UE/LE            Function:  FUNCTION Normal Difficult Unable   Sitting [x]?   Relieves pain []?  []?    Standing []?  [x]?  - pain with prolonged standing []?    Ambulation []?  [x]?  - pain increases with short distance []?    Groom/Dress [x]?  []?  []?    Lift/Carry []?  [x]?  -- pain inc []?    Stairs []?  [x]?  []?    Bending []?  [x]?  -- significant pain increase, unable to arise from flexed position []?    OH reach [x]?  []?  []?    Sit to Stand []?  [x]?  - pain, needs to use arm rest []?      OPTIMAL:   Carrying -- 3  Walking -- 4   Bending  -- 4    Assessment:    Pt continues to be limited in function by low back pain. Pain is still high and significantly limits movement. She tends to have very high pain with flexion that leads to tears and inability to arise back to standing. Pt has imaging confirming a spinal  Alignment deficits in which she will get injection for. Her strength in the LEs is fair but still having proximal weakness related to decreased core stability and back pain. Due to minimal progress and high pain levels, will hold on physical therapy at this time. Will allow pt to have injections and see if pain changes. If her MD recommends continued therapy, then she will be re-assessed when she calls to return. STG: (to be met in 6 treatments)  1. ? Pain: Lesson pain to 2/10.    6/3/2021: Partially met -- pain is fluctuating now and less intense 5/10 this date compared to 7/10 at al   2. ? ROM: lumbar AAROM into extension 0 % loss.     6/3/2021: Ongoing goal -- still very limited into extension   3. ? Strength: increase strength into extension to 3/5.    6/3/2021: MODIFY GOAL: Pt will be able to perform full leg extension in bent over position to increase multifidus strength to support joint to increase tolerance to upright activity   4. ? Function: OPTIMAL score of 9/3.   6/3/2021: Progressing -- OPTIMAL score 10/3 for carrying, walking, bending   5. Independent with Home Exercise Programs  6/3/2021: MET -- pt poses no questions   6. Demonstrate Knowledge of fall prevention  6/3: MET - pt notes strategies but has not implemented   LTG: (to be met in 12 treatments)  1. OPTIMAL score at the time of discharge of 6/3.    7/14/21: 11/3 this date, increase in score   2. ADDED: Pt will be able to tolerate >10 minutes of ambulation with LRAD per subjective report with no greater than 3 point increase in pain to progress to optimized community mobility. 7/14/21: still very limited   3. ADDED: Pt will be able to carry a light object (<10 lbs) without an increase in pain to show improved tolerance to household duties. 7/14/21: Pt notes she is unable to   4. ADDED: Pt will be educated on community resources for aquatic community wellness program to carry over relief pt gets from PT sessions to continue to remain active. 7/14/21: MET -- pt is aware and will explore. Treatment to Date:  [x] Therapeutic Exercise   55654  [] Iontophoresis: 4 mg/mL Dexamethasone Sodium Phosphate  mAmin  95334   [] Therapeutic Activity  02669 [] Vasopneumatic cold with compression  76122    [] Gait Training   60944 [] Ultrasound   97287   [] Neuromuscular Re-education  79668 [] Electrical Stimulation Unattended  16035   [] Manual Therapy  23265 [] Electrical Stimulation Attended  52821   [x] Instruction in HEP  [] Lumbar/Cervical Traction  91962   [x] Aquatic Therapy   72553 [] Cold/hotpack    [] Massage   34763      [] Dry Needling, 1 or 2 muscles  04862   [] Biofeedback, first 15 minutes   98998  [] Biofeedback, additional 15 minutes   34188 [] Dry Needling, 3 or more muscles  44023      Patient Status:     [] Continue per initial plan of care.     []

## 2021-07-15 ENCOUNTER — APPOINTMENT (OUTPATIENT)
Dept: OCCUPATIONAL THERAPY | Age: 61
End: 2021-07-15
Payer: COMMERCIAL

## 2021-07-16 ENCOUNTER — HOSPITAL ENCOUNTER (OUTPATIENT)
Age: 61
Setting detail: SPECIMEN
Discharge: HOME | End: 2021-07-16
Payer: COMMERCIAL

## 2021-07-16 DIAGNOSIS — N18.4 SECONDARY DIABETES MELLITUS WITH STAGE 4 CHRONIC KIDNEY DISEASE (HCC): ICD-10-CM

## 2021-07-16 DIAGNOSIS — N18.4 ANEMIA IN STAGE 4 CHRONIC KIDNEY DISEASE (HCC): ICD-10-CM

## 2021-07-16 DIAGNOSIS — D63.1 ANEMIA IN STAGE 4 CHRONIC KIDNEY DISEASE (HCC): ICD-10-CM

## 2021-07-16 DIAGNOSIS — N18.4 CKD (CHRONIC KIDNEY DISEASE), STAGE IV (HCC): ICD-10-CM

## 2021-07-16 DIAGNOSIS — E13.22 SECONDARY DIABETES MELLITUS WITH STAGE 4 CHRONIC KIDNEY DISEASE (HCC): ICD-10-CM

## 2021-07-16 DIAGNOSIS — R79.89 ELEVATED BRAIN NATRIURETIC PEPTIDE (BNP) LEVEL: ICD-10-CM

## 2021-07-16 DIAGNOSIS — R80.8 OTHER PROTEINURIA: ICD-10-CM

## 2021-07-16 LAB
-: ABNORMAL
ABSOLUTE EOS #: 0.21 K/UL (ref 0–0.44)
ABSOLUTE IMMATURE GRANULOCYTE: 0.05 K/UL (ref 0–0.3)
ABSOLUTE LYMPH #: 1.4 K/UL (ref 1.1–3.7)
ABSOLUTE MONO #: 0.51 K/UL (ref 0.1–1.2)
AMORPHOUS: ABNORMAL
ANION GAP SERPL CALCULATED.3IONS-SCNC: 16 MMOL/L (ref 9–17)
BACTERIA: ABNORMAL
BASOPHILS # BLD: 1 % (ref 0–2)
BASOPHILS ABSOLUTE: 0.05 K/UL (ref 0–0.2)
BILIRUBIN URINE: NEGATIVE
BUN BLDV-MCNC: 23 MG/DL (ref 8–23)
BUN/CREAT BLD: ABNORMAL (ref 9–20)
CALCIUM SERPL-MCNC: 8.9 MG/DL (ref 8.6–10.4)
CASTS UA: ABNORMAL /LPF (ref 0–2)
CHLORIDE BLD-SCNC: 105 MMOL/L (ref 98–107)
CO2: 19 MMOL/L (ref 20–31)
COLOR: YELLOW
CREAT SERPL-MCNC: 1.9 MG/DL (ref 0.5–0.9)
CREATININE URINE: 87.7 MG/DL (ref 28–217)
CRYSTALS, UA: ABNORMAL /HPF
DIFFERENTIAL TYPE: ABNORMAL
EOSINOPHILS RELATIVE PERCENT: 2 % (ref 1–4)
EPITHELIAL CELLS UA: ABNORMAL /HPF (ref 0–5)
GFR AFRICAN AMERICAN: 32 ML/MIN
GFR NON-AFRICAN AMERICAN: 27 ML/MIN
GFR SERPL CREATININE-BSD FRML MDRD: ABNORMAL ML/MIN/{1.73_M2}
GFR SERPL CREATININE-BSD FRML MDRD: ABNORMAL ML/MIN/{1.73_M2}
GLUCOSE BLD-MCNC: 249 MG/DL (ref 70–99)
GLUCOSE URINE: ABNORMAL
HCT VFR BLD CALC: 36.5 % (ref 36.3–47.1)
HEMOGLOBIN: 11.5 G/DL (ref 11.9–15.1)
IMMATURE GRANULOCYTES: 1 %
KETONES, URINE: NEGATIVE
LEUKOCYTE ESTERASE, URINE: ABNORMAL
LYMPHOCYTES # BLD: 16 % (ref 24–43)
MAGNESIUM: 2 MG/DL (ref 1.6–2.6)
MCH RBC QN AUTO: 30.3 PG (ref 25.2–33.5)
MCHC RBC AUTO-ENTMCNC: 31.5 G/DL (ref 28.4–34.8)
MCV RBC AUTO: 96.3 FL (ref 82.6–102.9)
MONOCYTES # BLD: 6 % (ref 3–12)
MUCUS: ABNORMAL
NITRITE, URINE: NEGATIVE
NRBC AUTOMATED: 0 PER 100 WBC
OTHER OBSERVATIONS UA: ABNORMAL
PDW BLD-RTO: 15.6 % (ref 11.8–14.4)
PH UA: 6 (ref 5–8)
PHOSPHORUS: 3 MG/DL (ref 2.6–4.5)
PLATELET # BLD: 212 K/UL (ref 138–453)
PLATELET ESTIMATE: ABNORMAL
PMV BLD AUTO: 11.1 FL (ref 8.1–13.5)
POTASSIUM SERPL-SCNC: 4.1 MMOL/L (ref 3.7–5.3)
PROTEIN UA: ABNORMAL
RBC # BLD: 3.79 M/UL (ref 3.95–5.11)
RBC # BLD: ABNORMAL 10*6/UL
RBC UA: ABNORMAL /HPF (ref 0–2)
RENAL EPITHELIAL, UA: ABNORMAL /HPF
SEG NEUTROPHILS: 74 % (ref 36–65)
SEGMENTED NEUTROPHILS ABSOLUTE COUNT: 6.81 K/UL (ref 1.5–8.1)
SODIUM BLD-SCNC: 140 MMOL/L (ref 135–144)
SPECIFIC GRAVITY UA: 1.01 (ref 1–1.03)
TOTAL PROTEIN, URINE: 127 MG/DL
TRICHOMONAS: ABNORMAL
TURBIDITY: CLEAR
URINE HGB: NEGATIVE
URINE TOTAL PROTEIN CREATININE RATIO: 1.45 (ref 0–0.2)
UROBILINOGEN, URINE: NORMAL
WBC # BLD: 9 K/UL (ref 3.5–11.3)
WBC # BLD: ABNORMAL 10*3/UL
WBC UA: ABNORMAL /HPF (ref 0–5)
YEAST: ABNORMAL

## 2021-07-23 PROBLEM — I25.9 CHRONIC ISCHEMIC HEART DISEASE: Status: ACTIVE | Noted: 2021-07-23

## 2021-07-29 ENCOUNTER — TELEPHONE (OUTPATIENT)
Dept: PAIN MANAGEMENT | Age: 61
End: 2021-07-29

## 2021-07-29 ENCOUNTER — HOSPITAL ENCOUNTER (OUTPATIENT)
Dept: OCCUPATIONAL THERAPY | Age: 61
Setting detail: THERAPIES SERIES
Discharge: HOME | End: 2021-07-29
Payer: COMMERCIAL

## 2021-07-29 VITALS — WEIGHT: 244 LBS | BODY MASS INDEX: 34.16 KG/M2 | HEIGHT: 71 IN

## 2021-07-29 PROCEDURE — 97535 SELF CARE MNGMENT TRAINING: CPT

## 2021-07-29 ASSESSMENT — PAIN SCALES - GENERAL: PAINLEVEL_OUTOF10: 7

## 2021-07-29 ASSESSMENT — PAIN DESCRIPTION - ONSET
ONSET: ON-GOING
ONSET_2: ON-GOING

## 2021-07-29 ASSESSMENT — PAIN DESCRIPTION - PAIN TYPE
TYPE_2: CHRONIC PAIN
TYPE: CHRONIC PAIN

## 2021-07-29 ASSESSMENT — PAIN DESCRIPTION - INTENSITY: RATING_2: 2

## 2021-07-29 ASSESSMENT — PAIN DESCRIPTION - ORIENTATION
ORIENTATION: RIGHT;LEFT
ORIENTATION_2: MID

## 2021-07-29 ASSESSMENT — PAIN DESCRIPTION - LOCATION
LOCATION: BACK;LEG
LOCATION_2: NECK

## 2021-07-29 ASSESSMENT — PAIN DESCRIPTION - DIRECTION: RADIATING_TOWARDS: BOTH LEGS

## 2021-07-29 ASSESSMENT — PAIN DESCRIPTION - DESCRIPTORS: DESCRIPTORS_2: ACHING;CONSTANT;DULL

## 2021-07-29 ASSESSMENT — PAIN DESCRIPTION - DURATION: DURATION_2: CONTINUOUS

## 2021-07-29 ASSESSMENT — PAIN DESCRIPTION - PROGRESSION: CLINICAL_PROGRESSION: GRADUALLY WORSENING

## 2021-07-29 ASSESSMENT — PAIN - FUNCTIONAL ASSESSMENT: PAIN_FUNCTIONAL_ASSESSMENT: PREVENTS OR INTERFERES SOME ACTIVE ACTIVITIES AND ADLS

## 2021-07-29 ASSESSMENT — PAIN DESCRIPTION - FREQUENCY: FREQUENCY: CONTINUOUS

## 2021-07-29 NOTE — TELEPHONE ENCOUNTER
Unable to reach pt per phone for smart phrases for visit with Dr Alfredo Donahue message left to return our call Statement Selected

## 2021-07-29 NOTE — FLOWSHEET NOTE
TREATMENT LOCATION:   [] [address]   [facility] [address]: [phone]  F: [phone] [x] [address]: [phone]  F: [phone]      Lymphedema Services - Treatment Note for Lower Extremity    Date:  2021  Patient: Abiola Dangelo  : 1960             MRN: [id number]  Referring Physician: AFSANEH Maria       Phone: [phone]  Fax: [phone]  Insurance: Jadon Muse [facility] ( [id number]) - No Copay, 40 visit limit combined with PT/OT, No Preauth required. # of visits allowed/remainin  Medical Diagnosis: Lymphedema   Rehab Codes: I89.0   Onset Date: 21  Visit# / total visits:     POC UPDATE AT VISIT: 10    Allergies:  [x] Adhesive tape    [x] Medications    Medications: See charted information in Epic  Past Medical History: See charted information in Epic     Restrictions: None  Fall Risk:   [x] No    [] Yes   If yes, intervention:       Pain:  [] YES    [x] NO   Location: 0     Pain Rating: ( 0-10 scale) : 0/10  Comments: Back pain only. History of Cancer: []Yes [x]No      Absolute Lymphedema Contraindications - treatement [] NONE     [x] CHF (only if patient is un-medicated or edema is solely d/t cardiac failure)    [x] DVT- Acute, No MLD to limb    ( HISTORY OF BLOOD CLOT)    _ Advanced Renal Disease - Need Physician Clearance    Absolute Contraindications Regarding the Deep Abdomen: [x] NONE   Relative Lymphedema Contraindications [x] Age 61+     OBJECTIVE  Presentation of Affected Areas  Location: bilateral Lower Extremity    Description: Dry/scaly skin  Pitting 2+  [place] hump on dorsum of foot  Doughy     Scars No   Wounds None   Sensation Numbness   Additional Comments:        Circumferential Measurements  Measurements taken from nail base of D2 digit.   Measurements (cm) Right Left   Dorsum   9 23.5 24.0   Inframalleolar   13       31.8 33.0   Supramalleolar      17 24.0 26.5 Lower Calf 26 28.0 29.5   Mid Calf 33 34.5 36.5   Upper Calf  40 37.0 39.5   Knee   48 36.8 36.5   10 cm above knee   68 46.5 49.5   Thigh-widest     Hip-widest     7/29/21 7/13/21 7/8/21 7/6/21 6/29/21    Initial Total  :  262.1    273.6    265.9    X    260.0    266.1 275.0    272.4    268.3    288. 3    277    281.5        ASSESSMENT: Patient would benefit from skilled occupational therapy services in order to:Decrease edema that has accumulated in the extremity, decrease risk of infection, improve limb ROM, and improve overall quality of life. Pt arrives to today treatment with compreflex velcro devices on the legs only. She states that she has been wearing these daytime only and removing them at night. She states that they are not swollen in the morning and they stay down in size through out the night. Overall she is very happy with these devices. Pt had called in last week reporting that she was feeling very weak after using her pump. She was instructed to decrease the time of use and then slowly increase the rate again. Pt states she did this and went down to 30 min before progressing back up to 60 min on pump use daily, she states that she has not had any other symptoms return since this time. Pt is reminded on the risk and precautions of pumping fluids to fast to the heart of which she is also educated on what to do if this happens ( by removing the devices and contacting her cardiologist if needed). She states that she has not noticed anything unusual and is starting to feel a little better, compared to the previous day she was here. Pt states she will call back in when she is receives her new pump and is able to trail it and then report back with her findings. Response to treatment: Pt verbalizes and is agreeable to the instructions/ POC established at today's evaluation.         PLAN FOR NEXT VISIT: Initiate CDT, educate risk reduction techniques, follow up on compression use    Therapy Goals  STG - To be addressed within 2 visits    Pt will demonstrate compliance of maintaining lymphedema precautions to reduce the risks of infection and further exacerbations. Pt will demonstrate independence with decongestive exercise program in order to expedite fluid rerouting. LTG To be adressed within 7 visits     Pt will demonstrate competence with SELF MLD (Manual lymphatic drainage) in order to reroute lymphatic pathways for decreased swelling. Pt/Caregiver will demonstrate independence with donning/doffing and wearing schedule for compression garments/ devices to maintain decreased size upon discharge. Pt will demonstrate compliance with skin care routine for improved overall skin integrity and decreased risk of wounds and infection. Pt to compliant with CDT in order to reduce edema in the L LE by 5+ cm and the R LE by 2+ cm . Patient's Goal: I would like to get rid of all this fluid in me. Response to Education Provided:  [x] Verbalized understanding   [x] Demonstrates/verbalizes HEP/Education previously given      [x] Needs continued review            [] No understanding   Learner(s): [x] Patient  [] Spouse [x] Family  [] Other:   Method(s): [x] Verbal [] Demonstration [x] Handouts [] Exercise booklet   Family available to assist with home program if needed: [x] Yes [] No    FREQUENCY: Pt will be seen 1 x/ week for 10 visits and will follow up as appropriate. Focus is to remain on short and long term goals listed above. Treatment Charges   Minutes   Units   Evaluation (27740)            Low            Moderate            High     Manual Therapy (76100): Therapeutic activities (26958): Therapeutic Exercise (22771)     Self care/home mgmt (65646) 50 3   Other:      Total Treatment Time    50 3       Time In:2:30  Time Out: 3:20      Electronically signed by Boyd Velásquez OT on 7/29/2021 at 2:38 PM

## 2021-08-09 ENCOUNTER — HOSPITAL ENCOUNTER (OUTPATIENT)
Dept: PAIN MANAGEMENT | Age: 61
Discharge: HOME | End: 2021-08-09
Payer: COMMERCIAL

## 2021-08-11 ENCOUNTER — HOSPITAL ENCOUNTER (INPATIENT)
Age: 61
LOS: 15 days | Discharge: HOME HEALTH | DRG: 056 | End: 2021-08-26
Attending: PHYSICAL MEDICINE & REHABILITATION | Admitting: PHYSICAL MEDICINE & REHABILITATION
Payer: COMMERCIAL

## 2021-08-11 DIAGNOSIS — N18.6 TYPE 2 DIABETES MELLITUS WITH CHRONIC KIDNEY DISEASE ON CHRONIC DIALYSIS, WITH LONG-TERM CURRENT USE OF INSULIN (HCC): ICD-10-CM

## 2021-08-11 DIAGNOSIS — M54.41 CHRONIC MIDLINE LOW BACK PAIN WITH BILATERAL SCIATICA: ICD-10-CM

## 2021-08-11 DIAGNOSIS — M54.42 CHRONIC MIDLINE LOW BACK PAIN WITH BILATERAL SCIATICA: ICD-10-CM

## 2021-08-11 DIAGNOSIS — Z99.2 TYPE 2 DIABETES MELLITUS WITH CHRONIC KIDNEY DISEASE ON CHRONIC DIALYSIS, WITH LONG-TERM CURRENT USE OF INSULIN (HCC): ICD-10-CM

## 2021-08-11 DIAGNOSIS — M48.062 SPINAL STENOSIS OF LUMBAR REGION WITH NEUROGENIC CLAUDICATION: ICD-10-CM

## 2021-08-11 DIAGNOSIS — F41.9 ANXIETY AND DEPRESSION: ICD-10-CM

## 2021-08-11 DIAGNOSIS — F32.A ANXIETY AND DEPRESSION: ICD-10-CM

## 2021-08-11 DIAGNOSIS — Z79.4 TYPE 2 DIABETES MELLITUS WITH CHRONIC KIDNEY DISEASE ON CHRONIC DIALYSIS, WITH LONG-TERM CURRENT USE OF INSULIN (HCC): ICD-10-CM

## 2021-08-11 DIAGNOSIS — G89.29 CHRONIC MIDLINE LOW BACK PAIN WITH BILATERAL SCIATICA: ICD-10-CM

## 2021-08-11 DIAGNOSIS — I25.10 CORONARY ARTERY DISEASE INVOLVING NATIVE CORONARY ARTERY OF NATIVE HEART WITHOUT ANGINA PECTORIS: ICD-10-CM

## 2021-08-11 DIAGNOSIS — E11.22 TYPE 2 DIABETES MELLITUS WITH CHRONIC KIDNEY DISEASE ON CHRONIC DIALYSIS, WITH LONG-TERM CURRENT USE OF INSULIN (HCC): ICD-10-CM

## 2021-08-11 DIAGNOSIS — D50.8 OTHER IRON DEFICIENCY ANEMIA: ICD-10-CM

## 2021-08-11 DIAGNOSIS — R53.82 CHRONIC FATIGUE: ICD-10-CM

## 2021-08-11 DIAGNOSIS — E78.2 MIXED HYPERLIPIDEMIA: ICD-10-CM

## 2021-08-11 PROBLEM — I63.9 ACUTE CEREBROVASCULAR ACCIDENT (CVA) (HCC): Status: ACTIVE | Noted: 2021-08-11

## 2021-08-11 LAB
GLUCOSE BLD-MCNC: 288 MG/DL (ref 65–105)
GLUCOSE BLD-MCNC: 350 MG/DL (ref 65–105)

## 2021-08-11 PROCEDURE — 82947 ASSAY GLUCOSE BLOOD QUANT: CPT

## 2021-08-11 PROCEDURE — 6370000000 HC RX 637 (ALT 250 FOR IP): Performed by: PHYSICAL MEDICINE & REHABILITATION

## 2021-08-11 PROCEDURE — 1200000000 HC SEMI PRIVATE

## 2021-08-11 PROCEDURE — 1180000000 HC REHAB R&B

## 2021-08-11 RX ORDER — PANTOPRAZOLE SODIUM 40 MG/1
40 TABLET, DELAYED RELEASE ORAL
Status: DISCONTINUED | OUTPATIENT
Start: 2021-08-12 | End: 2021-08-26 | Stop reason: HOSPADM

## 2021-08-11 RX ORDER — BUSPIRONE HYDROCHLORIDE 7.5 MG/1
7.5 TABLET ORAL 3 TIMES DAILY
Status: DISCONTINUED | OUTPATIENT
Start: 2021-08-11 | End: 2021-08-26 | Stop reason: HOSPADM

## 2021-08-11 RX ORDER — FUROSEMIDE 40 MG/1
80 TABLET ORAL 2 TIMES DAILY
Status: DISCONTINUED | OUTPATIENT
Start: 2021-08-11 | End: 2021-08-26 | Stop reason: HOSPADM

## 2021-08-11 RX ORDER — FERROUS SULFATE 325(65) MG
325 TABLET ORAL 2 TIMES DAILY WITH MEALS
Status: DISCONTINUED | OUTPATIENT
Start: 2021-08-12 | End: 2021-08-26 | Stop reason: HOSPADM

## 2021-08-11 RX ORDER — BISACODYL 10 MG
10 SUPPOSITORY, RECTAL RECTAL DAILY PRN
Status: DISCONTINUED | OUTPATIENT
Start: 2021-08-11 | End: 2021-08-26 | Stop reason: HOSPADM

## 2021-08-11 RX ORDER — GABAPENTIN 300 MG/1
300 CAPSULE ORAL 2 TIMES DAILY
Status: DISCONTINUED | OUTPATIENT
Start: 2021-08-11 | End: 2021-08-26 | Stop reason: HOSPADM

## 2021-08-11 RX ORDER — RANOLAZINE 1000 MG/1
1000 TABLET, EXTENDED RELEASE ORAL 2 TIMES DAILY
Status: DISCONTINUED | OUTPATIENT
Start: 2021-08-11 | End: 2021-08-26 | Stop reason: HOSPADM

## 2021-08-11 RX ORDER — TRAZODONE HYDROCHLORIDE 50 MG/1
50 TABLET ORAL NIGHTLY
Status: DISCONTINUED | OUTPATIENT
Start: 2021-08-11 | End: 2021-08-12

## 2021-08-11 RX ORDER — DEXTROSE MONOHYDRATE 25 G/50ML
12.5 INJECTION, SOLUTION INTRAVENOUS PRN
Status: DISCONTINUED | OUTPATIENT
Start: 2021-08-11 | End: 2021-08-26 | Stop reason: HOSPADM

## 2021-08-11 RX ORDER — ACETAMINOPHEN 325 MG/1
650 TABLET ORAL EVERY 4 HOURS PRN
Status: DISCONTINUED | OUTPATIENT
Start: 2021-08-11 | End: 2021-08-26 | Stop reason: HOSPADM

## 2021-08-11 RX ORDER — NICOTINE POLACRILEX 4 MG
15 LOZENGE BUCCAL PRN
Status: DISCONTINUED | OUTPATIENT
Start: 2021-08-11 | End: 2021-08-26 | Stop reason: HOSPADM

## 2021-08-11 RX ORDER — DEXTROSE MONOHYDRATE 50 MG/ML
100 INJECTION, SOLUTION INTRAVENOUS PRN
Status: DISCONTINUED | OUTPATIENT
Start: 2021-08-11 | End: 2021-08-26 | Stop reason: HOSPADM

## 2021-08-11 RX ORDER — POLYETHYLENE GLYCOL 3350 17 G/17G
17 POWDER, FOR SOLUTION ORAL DAILY
Status: DISCONTINUED | OUTPATIENT
Start: 2021-08-11 | End: 2021-08-26 | Stop reason: HOSPADM

## 2021-08-11 RX ORDER — SENNA PLUS 8.6 MG/1
2 TABLET ORAL DAILY PRN
Status: DISCONTINUED | OUTPATIENT
Start: 2021-08-11 | End: 2021-08-26 | Stop reason: HOSPADM

## 2021-08-11 RX ORDER — ATORVASTATIN CALCIUM 80 MG/1
80 TABLET, FILM COATED ORAL NIGHTLY
Status: DISCONTINUED | OUTPATIENT
Start: 2021-08-11 | End: 2021-08-26 | Stop reason: HOSPADM

## 2021-08-11 RX ORDER — TAMSULOSIN HYDROCHLORIDE 0.4 MG/1
0.4 CAPSULE ORAL DAILY
Status: DISCONTINUED | OUTPATIENT
Start: 2021-08-12 | End: 2021-08-26 | Stop reason: HOSPADM

## 2021-08-11 RX ORDER — INSULIN GLARGINE 100 [IU]/ML
48 INJECTION, SOLUTION SUBCUTANEOUS 2 TIMES DAILY
Status: DISCONTINUED | OUTPATIENT
Start: 2021-08-11 | End: 2021-08-16

## 2021-08-11 RX ORDER — FEBUXOSTAT 40 MG/1
40 TABLET, FILM COATED ORAL DAILY
Status: DISCONTINUED | OUTPATIENT
Start: 2021-08-11 | End: 2021-08-26 | Stop reason: HOSPADM

## 2021-08-11 RX ORDER — CARVEDILOL 6.25 MG/1
6.25 TABLET ORAL 2 TIMES DAILY WITH MEALS
Status: DISCONTINUED | OUTPATIENT
Start: 2021-08-11 | End: 2021-08-26 | Stop reason: HOSPADM

## 2021-08-11 RX ORDER — ASPIRIN 81 MG/1
81 TABLET, CHEWABLE ORAL DAILY
Status: DISCONTINUED | OUTPATIENT
Start: 2021-08-12 | End: 2021-08-26 | Stop reason: HOSPADM

## 2021-08-11 RX ADMIN — SALINE NASAL SPRAY 1 SPRAY: 1.5 SOLUTION NASAL at 20:37

## 2021-08-11 RX ADMIN — RANOLAZINE 1000 MG: 1000 TABLET, FILM COATED, EXTENDED RELEASE ORAL at 20:39

## 2021-08-11 RX ADMIN — ATORVASTATIN CALCIUM 80 MG: 80 TABLET, FILM COATED ORAL at 20:37

## 2021-08-11 RX ADMIN — INSULIN LISPRO 5 UNITS: 100 INJECTION, SOLUTION INTRAVENOUS; SUBCUTANEOUS at 20:39

## 2021-08-11 RX ADMIN — INSULIN GLARGINE 48 UNITS: 100 INJECTION, SOLUTION SUBCUTANEOUS at 20:39

## 2021-08-11 RX ADMIN — TRAZODONE HYDROCHLORIDE 50 MG: 50 TABLET ORAL at 20:37

## 2021-08-11 RX ADMIN — APIXABAN 5 MG: 5 TABLET, FILM COATED ORAL at 20:37

## 2021-08-11 RX ADMIN — BUSPIRONE HYDROCHLORIDE 7.5 MG: 7.5 TABLET ORAL at 20:38

## 2021-08-11 RX ADMIN — GABAPENTIN 300 MG: 300 CAPSULE ORAL at 20:37

## 2021-08-11 NOTE — PROGRESS NOTES
[address]  Acute Inpatient Rehab Preadmission Assessment    Patient Name: Ludwig Ovalle        MRN: [id number]    : 1960  (59 y.o.)  Gender: female     [facility] from:   [facility]  [facility]   [facility]   [facility]   [x]Outside Admission - Location:  [facility]                                 [x]Initial         []Updated    Date of Onset / Admission to the acute hospital:  21    Inpatient Rehabilitation Admitting Diagnosis:  CVA    Did patient have surgery/procedures? []No  [x]Yes:  Placement of Dialysis Catheter      Physicians: Zoltan Booth (Nephro), Cherise Heart (Admitting Hospitalist), Wenceslao Klinefelter (Neuro), Afshin Bailey (PMR), Prone (Urology)    Risk for clinical complications: Moderate    Co-morbidities:     1. Type 2 DM  2. AURELIA on CKD 4  3. CAD s/p CABG  4.  Cervical Stenosis  5. Back Pain  6. Lymphedema  7. Diastolic CHF  8. DVT  9. Urinary Retention  10. Iron Deficiency Anemia    Financial Information  Primary insurance:  []Medicare [] Medicare HMO  [x]Commercial insurance    []Medicaid   [] Medicaid HMO []Workers Compensation   []Personal Pay    Secondary Insurance:  []Medicare [] Medicare HMO  []Commercial insurance    []Medicaid  []Medicaid HMO  []Workers Compensation  [x]None    Precautions:   []Cardiac Precautions:    []Total hip precautions:    []Weight Bearing status:  [x]Safety Precautions/Concerns:  Fall Risk, General Precautions  []Visually impaired:     []Hard of Hearing:     Isolation Precautions:         []Yes  [x]No  If Yes:  [] Droplet  []Contact []Airborne     []VRE     []MRSA     []C-diff         [] TB       [] ESBL [] MDRO          [] Other:        Physiatrist:  []   Dr. Gómez Asher     [x]   Dr. Dorian Chris  []   Dr. Kranthi Talamantes  []   Dr. Keri Bernardo    Patients Occupation: Disabled    Reviewed Lab and Diagnostic reports from Current Admission: Yes    Patients Prior Functional  Level: Independent with transfers, ambulation, and ADLs.  Used 8EP for ambulation outside of the home. History of current illness:  Presented with acute right arm weakness and mental status change. MRI brain showed probable acute to subacute lacunar infarct of the left frontal lobe. AURELIA on CKD stage 4 likely d/t contrast induced nephropathy. Currently hemodialysis dependent, and dialysis was initiated on 8/6/21. Prognosis: Fair    Current functional status for upper extremity ADLs:  CGA         Current functional status for lower extremity ADLs:  CGA       Current functional status for bed, chair, wheelchair transfers:  CGA       Current functional status for toilet transfers:  CGA       Current functional status for locomotion:  40', RW, Min A. Pt able to achieve toe push off, no shaking of LE today, pt able to tolerate increased gait distance, and did not require A to advance walker.        Current functional status for comprehension:  TBD    Current functional status for expression: TBD    Current functional status for social interaction: TBD    Current functional status for problem solving: TBD    Current functional status for memory: TBD    Current Deficits R/T Impairment: Impaired Functional Mobility and Decreased ADLs    Required Therapy:   [x] Physical Therapy  [x] Occupational Therapy   [x] Speech Therapy, as appropriate    Additional Services:    [x]     [] Recreational Therapy, as appropriate    [x] Nutrition    [x] Dialysis, as needed  [] Cultural Needs Identified  [] Special Equipment Needs  [] Other    Rehab Justification:  Needs 3 hrs therapy per day or 15 hours per week:  Yes  Identified Rehab Nursing needs: Yes  Intense Interdisciplinary need:  Yes  Need for 24 hr physician supervision:  Yes  Measurable improved quality of life:  Yes  Willingness to participate:  Yes  Medical Necessity:  Yes  Patient able to tolerate care proposed:  Yes    Expected Discharge Destination/Functional Level:  Home with assist  Expected length of time to achieve

## 2021-08-12 LAB
ANION GAP SERPL CALCULATED.3IONS-SCNC: 12 MMOL/L (ref 9–17)
BUN BLDV-MCNC: 57 MG/DL (ref 8–23)
BUN/CREAT BLD: ABNORMAL (ref 9–20)
CALCIUM SERPL-MCNC: 9.4 MG/DL (ref 8.6–10.4)
CHLORIDE BLD-SCNC: 102 MMOL/L (ref 98–107)
CO2: 25 MMOL/L (ref 20–31)
CREAT SERPL-MCNC: 2.98 MG/DL (ref 0.5–0.9)
GFR AFRICAN AMERICAN: 19 ML/MIN
GFR NON-AFRICAN AMERICAN: 16 ML/MIN
GFR SERPL CREATININE-BSD FRML MDRD: ABNORMAL ML/MIN/{1.73_M2}
GFR SERPL CREATININE-BSD FRML MDRD: ABNORMAL ML/MIN/{1.73_M2}
GLUCOSE BLD-MCNC: 192 MG/DL (ref 65–105)
GLUCOSE BLD-MCNC: 215 MG/DL (ref 70–99)
GLUCOSE BLD-MCNC: 271 MG/DL (ref 65–105)
GLUCOSE BLD-MCNC: 290 MG/DL (ref 65–105)
GLUCOSE BLD-MCNC: 330 MG/DL (ref 65–105)
HCT VFR BLD CALC: 31.8 % (ref 36–46)
HEMOGLOBIN: 10.6 G/DL (ref 12–16)
MCH RBC QN AUTO: 32.3 PG (ref 26–34)
MCHC RBC AUTO-ENTMCNC: 33.4 G/DL (ref 31–37)
MCV RBC AUTO: 96.9 FL (ref 80–100)
NRBC AUTOMATED: ABNORMAL PER 100 WBC
PDW BLD-RTO: 17 % (ref 11.5–14.9)
PLATELET # BLD: 181 K/UL (ref 150–450)
PMV BLD AUTO: 8.4 FL (ref 6–12)
POTASSIUM SERPL-SCNC: 3.9 MMOL/L (ref 3.7–5.3)
RBC # BLD: 3.28 M/UL (ref 4–5.2)
SODIUM BLD-SCNC: 139 MMOL/L (ref 135–144)
WBC # BLD: 8.6 K/UL (ref 3.5–11)

## 2021-08-12 PROCEDURE — 85027 COMPLETE CBC AUTOMATED: CPT

## 2021-08-12 PROCEDURE — 97530 THERAPEUTIC ACTIVITIES: CPT

## 2021-08-12 PROCEDURE — 80048 BASIC METABOLIC PNL TOTAL CA: CPT

## 2021-08-12 PROCEDURE — 97116 GAIT TRAINING THERAPY: CPT

## 2021-08-12 PROCEDURE — 82947 ASSAY GLUCOSE BLOOD QUANT: CPT

## 2021-08-12 PROCEDURE — 99223 1ST HOSP IP/OBS HIGH 75: CPT | Performed by: INTERNAL MEDICINE

## 2021-08-12 PROCEDURE — 6370000000 HC RX 637 (ALT 250 FOR IP): Performed by: PHYSICAL MEDICINE & REHABILITATION

## 2021-08-12 PROCEDURE — 99213 OFFICE O/P EST LOW 20 MIN: CPT

## 2021-08-12 PROCEDURE — 97161 PT EVAL LOW COMPLEX 20 MIN: CPT

## 2021-08-12 PROCEDURE — 97166 OT EVAL MOD COMPLEX 45 MIN: CPT

## 2021-08-12 PROCEDURE — 92523 SPEECH SOUND LANG COMPREHEN: CPT

## 2021-08-12 PROCEDURE — 99223 1ST HOSP IP/OBS HIGH 75: CPT | Performed by: PHYSICAL MEDICINE & REHABILITATION

## 2021-08-12 PROCEDURE — 97535 SELF CARE MNGMENT TRAINING: CPT

## 2021-08-12 PROCEDURE — 97162 PT EVAL MOD COMPLEX 30 MIN: CPT

## 2021-08-12 PROCEDURE — 1180000000 HC REHAB R&B

## 2021-08-12 PROCEDURE — 51798 US URINE CAPACITY MEASURE: CPT

## 2021-08-12 PROCEDURE — 1200000000 HC SEMI PRIVATE

## 2021-08-12 PROCEDURE — 97110 THERAPEUTIC EXERCISES: CPT

## 2021-08-12 PROCEDURE — 36415 COLL VENOUS BLD VENIPUNCTURE: CPT

## 2021-08-12 RX ORDER — TRAZODONE HYDROCHLORIDE 100 MG/1
100 TABLET ORAL NIGHTLY
Status: DISCONTINUED | OUTPATIENT
Start: 2021-08-12 | End: 2021-08-26 | Stop reason: HOSPADM

## 2021-08-12 RX ADMIN — RANOLAZINE 1000 MG: 1000 TABLET, FILM COATED, EXTENDED RELEASE ORAL at 21:28

## 2021-08-12 RX ADMIN — FUROSEMIDE 80 MG: 40 TABLET ORAL at 09:25

## 2021-08-12 RX ADMIN — GABAPENTIN 300 MG: 300 CAPSULE ORAL at 09:08

## 2021-08-12 RX ADMIN — FEBUXOSTAT 40 MG: 40 TABLET, FILM COATED ORAL at 09:20

## 2021-08-12 RX ADMIN — INSULIN GLARGINE 48 UNITS: 100 INJECTION, SOLUTION SUBCUTANEOUS at 09:32

## 2021-08-12 RX ADMIN — CARVEDILOL 6.25 MG: 6.25 TABLET, FILM COATED ORAL at 17:00

## 2021-08-12 RX ADMIN — TAMSULOSIN HYDROCHLORIDE 0.4 MG: 0.4 CAPSULE ORAL at 09:08

## 2021-08-12 RX ADMIN — INSULIN LISPRO 3 UNITS: 100 INJECTION, SOLUTION INTRAVENOUS; SUBCUTANEOUS at 21:30

## 2021-08-12 RX ADMIN — CARVEDILOL 6.25 MG: 6.25 TABLET, FILM COATED ORAL at 09:26

## 2021-08-12 RX ADMIN — PANTOPRAZOLE SODIUM 40 MG: 40 TABLET, DELAYED RELEASE ORAL at 06:40

## 2021-08-12 RX ADMIN — ASPIRIN 81 MG: 81 TABLET, CHEWABLE ORAL at 09:08

## 2021-08-12 RX ADMIN — FUROSEMIDE 80 MG: 40 TABLET ORAL at 17:03

## 2021-08-12 RX ADMIN — SALINE NASAL SPRAY 1 SPRAY: 1.5 SOLUTION NASAL at 10:30

## 2021-08-12 RX ADMIN — STANDARDIZED SENNA CONCENTRATE 17.2 MG: 8.6 TABLET ORAL at 02:11

## 2021-08-12 RX ADMIN — INSULIN LISPRO 8 UNITS: 100 INJECTION, SOLUTION INTRAVENOUS; SUBCUTANEOUS at 11:32

## 2021-08-12 RX ADMIN — BUSPIRONE HYDROCHLORIDE 7.5 MG: 7.5 TABLET ORAL at 21:29

## 2021-08-12 RX ADMIN — GABAPENTIN 300 MG: 300 CAPSULE ORAL at 21:26

## 2021-08-12 RX ADMIN — BUSPIRONE HYDROCHLORIDE 7.5 MG: 7.5 TABLET ORAL at 09:20

## 2021-08-12 RX ADMIN — ATORVASTATIN CALCIUM 80 MG: 80 TABLET, FILM COATED ORAL at 21:27

## 2021-08-12 RX ADMIN — INSULIN LISPRO 2 UNITS: 100 INJECTION, SOLUTION INTRAVENOUS; SUBCUTANEOUS at 09:32

## 2021-08-12 RX ADMIN — ACETAMINOPHEN 650 MG: 325 TABLET, FILM COATED ORAL at 21:27

## 2021-08-12 RX ADMIN — SALINE NASAL SPRAY 1 SPRAY: 1.5 SOLUTION NASAL at 06:40

## 2021-08-12 RX ADMIN — INSULIN LISPRO 6 UNITS: 100 INJECTION, SOLUTION INTRAVENOUS; SUBCUTANEOUS at 17:03

## 2021-08-12 RX ADMIN — APIXABAN 5 MG: 5 TABLET, FILM COATED ORAL at 21:27

## 2021-08-12 RX ADMIN — SALINE NASAL SPRAY 1 SPRAY: 1.5 SOLUTION NASAL at 19:40

## 2021-08-12 RX ADMIN — BUSPIRONE HYDROCHLORIDE 7.5 MG: 7.5 TABLET ORAL at 14:25

## 2021-08-12 RX ADMIN — SALINE NASAL SPRAY 1 SPRAY: 1.5 SOLUTION NASAL at 21:27

## 2021-08-12 RX ADMIN — POLYETHYLENE GLYCOL 3350 17 G: 17 POWDER, FOR SOLUTION ORAL at 09:08

## 2021-08-12 RX ADMIN — STANDARDIZED SENNA CONCENTRATE 17.2 MG: 8.6 TABLET ORAL at 12:47

## 2021-08-12 RX ADMIN — INSULIN GLARGINE 48 UNITS: 100 INJECTION, SOLUTION SUBCUTANEOUS at 21:30

## 2021-08-12 RX ADMIN — APIXABAN 5 MG: 5 TABLET, FILM COATED ORAL at 09:08

## 2021-08-12 RX ADMIN — RANOLAZINE 1000 MG: 1000 TABLET, FILM COATED, EXTENDED RELEASE ORAL at 09:20

## 2021-08-12 RX ADMIN — ACETAMINOPHEN 650 MG: 325 TABLET, FILM COATED ORAL at 09:28

## 2021-08-12 RX ADMIN — TRAZODONE HYDROCHLORIDE 100 MG: 100 TABLET ORAL at 21:27

## 2021-08-12 ASSESSMENT — PAIN DESCRIPTION - PAIN TYPE
TYPE: CHRONIC PAIN

## 2021-08-12 ASSESSMENT — PAIN SCALES - GENERAL
PAINLEVEL_OUTOF10: 2
PAINLEVEL_OUTOF10: 0
PAINLEVEL_OUTOF10: 4
PAINLEVEL_OUTOF10: 2
PAINLEVEL_OUTOF10: 0
PAINLEVEL_OUTOF10: 5

## 2021-08-12 ASSESSMENT — PAIN DESCRIPTION - PROGRESSION
CLINICAL_PROGRESSION: NOT CHANGED

## 2021-08-12 ASSESSMENT — 9 HOLE PEG TEST
TESTTIME_SECONDS: 24.73
TEST_RESULT: IMPAIRED
TESTTIME_SECONDS: 31.27
TEST_RESULT: FUNCTIONAL
TEST_RESULT: IMPAIRED
TEST_RESULT: FUNCTIONAL
TESTTIME_SECONDS: 31.27
TESTTIME_SECONDS: 24.74

## 2021-08-12 ASSESSMENT — PAIN DESCRIPTION - ONSET: ONSET: ON-GOING

## 2021-08-12 ASSESSMENT — PAIN DESCRIPTION - LOCATION
LOCATION: BACK
LOCATION: BACK;NECK
LOCATION: BACK;NECK

## 2021-08-12 ASSESSMENT — PAIN DESCRIPTION - DESCRIPTORS
DESCRIPTORS: CONSTANT
DESCRIPTORS: CONSTANT

## 2021-08-12 ASSESSMENT — PAIN DESCRIPTION - FREQUENCY
FREQUENCY: CONTINUOUS
FREQUENCY: CONTINUOUS

## 2021-08-12 ASSESSMENT — PAIN DESCRIPTION - ORIENTATION: ORIENTATION: UPPER;LOWER

## 2021-08-12 NOTE — PROGRESS NOTES
Physical Therapy    Facility/Department: [facility]  Initial Assessment    NAME: Lenny Barrera  : 1960  MRN: [id number]    Date of Service: 2021    Discharge Recommendations:  Patient would benefit from continued therapy after discharge, Home with assist PRN   PT Equipment Recommendations  Equipment Needed: No    Assessment   Body structures, Functions, Activity limitations: Decreased functional mobility ; Decreased strength;Decreased endurance;Decreased balance; Increased pain  Assessment: Pt. presents after a CVA with decreased strength and balance, limiting functional mobility. Pt. with history of diabetic neuropathy and lymphedema. Pt. SBA for bed mobility and CGA for transfers using a rolling walker. Pt. ambulated 45ft. in 45s with CGA using a rolling walker, then 100ft. in 1:50 minutes with CGA using a rolling walker, then 148ft. in 2 minutes using a rollator. Pt. limited by upper and lower back pain and decreased endurance. Pt. displayed shortness of breath upon exertion. Treatment Diagnosis: CVA  Specific instructions for Next Treatment: Balance training, transfer training, gait training  Prognosis: Good  Clinical Presentation: Pt. up in bed upon arrival. R internal jugular triple lumen catheter in for dialysis. Dialysis currently on hold due to improved kidney labs. PT Education: Goals;Transfer Training;PT Role;Plan of Care;Energy Conservation;General Safety; Functional Mobility Training;Gait Training  REQUIRES PT FOLLOW UP: Yes  Activity Tolerance  Activity Tolerance: Patient limited by pain; Patient limited by endurance       Patient Diagnosis(es): There were no encounter diagnoses.      has a past medical history of Backache, unspecified, CHF (congestive heart failure) ([facility] [address] Chronic kidney disease, Coronary atherosclerosis of artery bypass graft, Cramp of limb, Gallstones, Hypertension, Insomnia, Pneumonia, Type II or unspecified type diabetes mellitus with renal manifestations, not Constant  Pain Frequency: Continuous  Pain Onset: On-going  Clinical Progression: Not changed  Vital Signs  Patient Currently in Pain: Yes  Oxygen Therapy  O2 Device: None (Room air)       Orientation  Orientation  Overall Orientation Status: Within Functional Limits  Social/Functional History  Social/Functional History  Lives With: Family (Lives with mother )  Type of Home:  (Condo )  Home Layout: One level  Home Access: Ramped entrance  Bathroom Shower/Tub: Walk-in shower, Doors (Threshold to step over )  Bathroom Toilet: Handicap height  Bathroom Equipment: Grab bars in shower, Built-in shower seat (Pt. reports using wall of shower to get off toilet)  Bathroom Accessibility: Walker accessible  Home Equipment: 4 wheeled walker, Cane (Pt. 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 uses motorized cart at [facility] [address], sister does grocery shopping typically. Additional Comments: Pt. sleeps in flat bed at home, bedroom/living room is carpet, kitchen/bathroom linoleum. Pt. looking into getting hand-held shower head. Pt.'s mother becoming less mobile, pt.'s sister helps out mother. Pt. reports that 2 sisters can assist. Pt.s father in [facility] home. Pt. recently had 2 sessions of dialysis at [facility], hasn't had it for 2 days as kidney labs are improving. Pt reports that her 2 sisters live close by and that her one sister is retired and able to assist as needed.    Cognition        Objective          AROM RLE (degrees)  RLE AROM: WFL  RLE General AROM: Grossly, functional, knee/ankle ROM limited due to lymphedema  AROM LLE (degrees)  LLE AROM : WFL  LLE General AROM: Grossly, functional, knee/ankle ROM limited due to lymphedema  AROM RUE (degrees)  RUE General AROM: See OT eval   AROM LUE (degrees)  LUE General AROM: See OT eval   Strength RLE  Strength RLE: WFL  Comment: Grossly, hip 3+/5, knee/ankle 4-/5  Strength LLE  Strength LLE: WFL  Comment: Grossly, 4-/5  Strength RUE  Comment: See OT eval   Strength LUE  Comment: See OT eval      Sensation  Overall Sensation Status: Impaired (Numbness in B feet/calves 2* diabetic neuropathy)  Bed mobility  Bridging: Stand by assistance  Rolling to Left: Stand by assistance  Rolling to Right: Stand by assistance  Supine to Sit: Stand by assistance  Scooting: Stand by assistance  Comment: HOB elevated for bed mobility, pt. used railings to assist.  Transfers  Sit to Stand: Contact guard assistance  Stand to sit: Contact guard assistance  Bed to Chair: Contact guard assistance  Stand Pivot Transfers: Contact guard assistance  Lateral Transfers: Contact guard assistance  Comment: Transfers assessed with rolling walker. Ambulation  Ambulation?: Yes  Ambulation 1  Surface: level tile  Device: Rolling Walker  Assistance: Contact guard assistance  Quality of Gait: Pt. displays decreased balance during gait. Distance: 45ft. in 45s   Comments: Lymphedema braces donned prior to gait. Pt. ambulated from bed to hallway with w/c follow. Pt. asked to sit down due to back pain that increased with mobility. Ambulation 2  Surface - 2: level tile  Device 2: Rolling Walker  Assistance 2: Contact guard assistance  Quality of Gait 2: Pt. displayed slightly decreased balance during gait, did not lose balance but was leaning slightly towards the left. Distance: 100ft. in 1:50 minutes  Comments: Lymphedema braces donned prior to gait. Pt. ambulated in hallway with w/c follow, completing 2 turns.  Pt. reported back pain and shortness of breath during and after ambulation. Ambulation 3  Surface - 3: level tile  Device 3: Rollator  Assistance 3: Contact guard assistance  Quality of Gait 3: Pt. displayed decreased balance during gait, but did not lose balance. Distance: 148ft. in 2:00 minutes   Comments: Lymphedema braces donned prior to gait. Pt. ambulated in hallway with 2 turns and 1 standing break. Pt. reports that rollator feels better on her back but is more unsteady for her. Pt. with shortness of breath upon exertion. Stairs/Curb  Stairs?: No     Balance  Posture: Good  Sitting - Static: Fair;+  Sitting - Dynamic: Fair  Standing - Static: Fair  Standing - Dynamic: Fair;-  Comments: Sitting balance assessed EOB, standing balance assessed with rolling walker and rollator   Other exercises  Other exercises 1: Bed mobility  Other exercises 2: Transfer training  Other exercises 3: Gait training  Other exercises 4: Pt. educated on therapy role and safety during mobility      Plan   Plan  Times per week: 1.5hrs/day  Times per day: Daily  Specific instructions for Next Treatment: Balance training, transfer training, gait training  Current Treatment Recommendations: Strengthening, Balance Training, Transfer Training, Functional Mobility Training, ROM, Endurance Training, Gait Training, Stair training, Pain Management, Home Exercise Program, Safety Education & Training, Neuromuscular Re-education, Patient/Caregiver Education & Training, Equipment Evaluation, Education, & procurement  Safety Devices  Type of devices: All fall risk precautions in place, Call light within reach, Gait belt, Patient at risk for falls, Left in chair, Nurse notified    G-Code       OutComes Score   2MWT: 148ft. Postural Assessment Scale for Stroke Patients   (PASS) Scoring Form     Give the subject instructions for each item as written below. When scoring the item, record the lowest response category that applies for each item. Maintaining a Posture  1.  Sitting Without Support  Examiner: Have the subject sit on a bench/mat without support and with feet flat on the floor. 3 Can sit for 5 minutes without support  2. Standing with Support Examiner: Have the subject stand, providing support as needed. Evaluate only the ability to stand with or without support. Do not consider the quality of the stance. 3     Can stand with support of only 1 hand  3. Standing Without Support  Examiner:  Have the subject stand without support. Evaluate only the ability to stand with or without support. Do not consider the quality of the stance. 1  Can stand without support for 10 seconds or leans heavily on 1 leg  4. Standing on Non-paretic Leg  Examiner: Have the subject stand on the non-paretic leg. Evaluate only the ability to bear weight entirely on the non-paretic leg. Do not consider how the subject accomplishes the task. 1  Can stand on non-paretic leg for a few seconds  5. Standing on Paretic Leg  Examiner: Have the subject stand on the paretic leg. Evaluate only the ability to bear weight entirely on the paretic leg. Do not consider how the subject accomplishes the task. 0  Cannot stand of paretic leg     Maintaining Posture SUBTOTAL     8/15    Changing a Posture  6. Supine to Paretic Side Lateral  Examiner:  Begin with the subject in supine on a treatment mat. Instruct the subject to roll to the paretic side (lateral movement). Assist as necessary. Evaluated the subject's performance on the amount of help required. Do not consider the quality of performance. 3  Can perform without help  7. Supine to Non-paretic Side Lateral  Examiner:  Begin with the subject in supine on a treatment mat. Instruct the subject to roll to the non-paretic side (lateral movement). Assist as necessary. Evaluate the subject's Performance on the amount of help required. Do not consider the quality of performance. 3  Can perform without help  8.   Supine to Sitting up on the [facility] of the Mat   Examiner:  Begin with the subject in supine on a treatment mat. Instruct the subject to come to sitting on the edge of the mat. Assist as necessary. Evaluate the subject's performance on the amount of help required. Do not considered the quality performance. 3  Can perform without help   9. Sitting on the Edge of the Mat to Supine  Examiner: Begin with the subject sitting on the edge of a treatment mat. Instruct the subject to return to supine. Assist as necessary. Evaluate the subjects performance on the amount of help required. Co not consider the quality of performance. 3  Can perform without help  10. Sitting to Standing Up  Examiner:  Begin with the subject sitting on the edge of a treatment mat. Instruct the subject to stand up without support. Assist if necessary. Evaluated the subject's performance on the amount ot help required. Do not consider the quality of the performance. 2  Can perform with little help  11. Standing Up to Sitting Down  Examiner:  Begin with the subject standing by the edge of a treatment mat. Instruct the subject to sit on the edge of mat without support. Assist if necessary. Evaluated the subject's performance on the amount of help required. Do not consider the quality of the performance. 2  Can perform with little help  12 . Standing, Picking up a Pencil from the Floor  Examiner:  Begin with the subject standing. Instruct the subject to  a pencil from the floor without support. Assist if necessary. Evaluate the subject's performance on the amount of help required. Do Not consider the quality of the performance. 0  Cannot perform    Changing Posture SUBTOTAL   16/21    PASS TOTAL   24/36                                             AM-PAC Score             Goals  Short term goals  Time Frame for Short term goals: 5 days   Short term goal 1: Pt. to perform bed mobility independently.    Short term goal 2: Pt. to tolerate 30 to 45 minutes of therapeutic exercise to improve strength and endurance for increased functional mobility. Short term goal 3: Pt. to improve seated static and dynamic balance to good. Short term goal 4: Pt. to improve standing static balance to fair+ and standing dynamic balance to fair. Short term goal 5: Pt. to ambulate 200ft. with supervision using a rollator. Short term goal 6: Pt. to ascend/descend 3 steps with CGA using one railing  Long term goals  Time Frame for Long term goals : By DC  Long term goal 1: Pt. to perform all transfers independently or with Mod-I. Long term goal 2: Pt. to improve standing static and dynamic balance to good. Long term goal 3: Pt. to ambulate 100ft. on an unlevel/ inclined surface with Mod-I using a rollator. Long term goal 4: Pt. to ascend/descend one flight of stairs with supervision   Long term goal 5: Pt. to ambulate 200ft. in 2 minutes with Mod-I using a rollator. Long term goal 6: Pt. to improve PASS score from 24/36 to 35/36. Patient Goals   Patient goals : Improve balance and strength to be able to walk independently with rollator. Therapy Time   Individual Concurrent Group Co-treatment   Time In 1118         Time Out 0926         Minutes 65         Timed Code Treatment Minutes: 39 Minutes     PT evaluation/treatment is completed by SUKUMAR Carrion under the direct supervision of co-signing therapist, who agrees with all documentation and evaluation/treatment.   Vignesh PATINO

## 2021-08-12 NOTE — FLOWSHEET NOTE
08/12/21 1542   Encounter Summary   Services provided to: Patient   Referral/Consult From: [facility]   (8/12/21V)   Volunteer Visit Yes   Complexity of Encounter Low   Length of Encounter 15 minutes   Spiritual/Episcopal   Type Spiritual support   Intervention Communion   Sacraments   Communion Patient received communion

## 2021-08-12 NOTE — CONSULTS
[id number] [facility] Wound Ostomy Continence Nurse  Consult Note       NAME:  Dominic Foss  MEDICAL RECORD NUMBER:  [id number]  AGE: 59 y.o.    GENDER: female  : 1960  TODAY'S DATE:  2021    Subjective:     Reason for Wound Tahira Jane Care and Assessment: buttock wound      Dominic Foss is a 59 y.o. female referred by:   [x] Physician  [] Nursing  [] Other:       Wound Identification:  Wound Type: skin tear  Contributing Factors: diabetes, decreased mobility and shear force    Wound History: pt does not recall any injury or skin problems to buttocks  Current Wound Care Treatment:  foam    Patient Goal of Care:  [x] Wound Healing  [] Odor Control  [] Palliative Care  [] Pain Control   [] Other:         PAST MEDICAL HISTORY        Diagnosis Date    Backache, unspecified     CHF (congestive heart failure) (HCC)     Chronic kidney disease     Coronary atherosclerosis of artery bypass graft     Cramp of limb     Gallstones     Hypertension     Insomnia     Pneumonia     Type II or unspecified type diabetes mellitus with renal manifestations, not stated as uncontrolled(250.40)     Type II or unspecified type diabetes mellitus without mention of complication, not stated as uncontrolled     Unspecified vitamin D deficiency        PAST SURGICAL HISTORY    Past Surgical History:   Procedure Laterality Date    ANKLE FRACTURE SURGERY      BREAST SURGERY      CARPAL TUNNEL RELEASE      CHOLECYSTECTOMY, OPEN N/A     CORONARY ARTERY BYPASS GRAFT      x3    HAND SURGERY      KNEE ARTHROSCOPY      right    TONSILLECTOMY         FAMILY HISTORY    Family History   Problem Relation Age of Onset    Diabetes Father     Heart Failure Father        SOCIAL HISTORY    Social History     Tobacco Use    Smoking status: Never Smoker    Smokeless tobacco: Never Used   Substance Use Topics    Alcohol use: No    Drug use: No       ALLERGIES    Allergies   Allergen Reactions    Adhesive Tape Other (See Comments) and Rash Other reaction(s): Unknown    Ace Inhibitors Other (See Comments)     cough    Iv Dye [Iodides]      Stage 4 kidney disease    Metformin And Related Hives, Itching and Rash           Objective:      /62   Pulse 79   Temp 98.3 °F (36.8 °C) (Oral)   Resp 16   Ht 5' 5\" (1.651 m)   Wt 244 lb 12.8 oz (111 kg)   SpO2 95%   BMI 40.74 kg/m²       LABS    CBC:   Lab Results   Component Value Date    WBC 8.6 08/12/2021    RBC 3.28 08/12/2021    HGB 10.6 08/12/2021     CMP:  Albumin:    Lab Results   Component Value Date    LABALBU 3.5 05/26/2021     PT/INR:    Lab Results   Component Value Date    PROTIME 14.9 04/06/2021    PROTIME 16.4 08/27/2015    PROTIME 16.4 08/27/2015    INR 1.2 04/06/2021     HgBA1c:    Lab Results   Component Value Date    LABA1C 8.9 03/15/2021     PTT: No components found for: LABPTT    Review of Systems    Assessment:     Physical Exam      Rashel Risk Score: Rashel Scale Score: 17    Patient Active Problem List   Diagnosis Code    Coronary artery disease I25.10    Chronic diastolic heart failure (HCC) I50.32    Diabetic polyneuropathy associated with type 2 diabetes mellitus (HCC) E11.42    History of coronary artery bypass graft Z95.1    Iron deficiency anemia D50.9    Spinal stenosis of lumbar region with neurogenic claudication M48.062    Mixed hyperlipidemia E78.2    Stage 4 chronic kidney disease (HCC) N18.4    Type 2 diabetes mellitus with kidney complication, with long-term current use of insulin ([facility]) E11.29, Z79.4    Syncope and collapse R55    Obesity, Class II, BMI 35-39.9 E66.9    Thyroid nodule greater than or equal to 1 cm in diameter incidentally noted on imaging study E04.1    Essential hypertension I10    Anemia in stage 4 chronic kidney disease (HCC) N18.4, D63.1    Chronic ischemic heart disease I25.9    Acute cerebrovascular accident (CVA) ([facility] [address] I63.9       Patient Active Problem List   Diagnosis    Coronary artery disease    Chronic diastolic heart failure ([facility] [address]    Diabetic polyneuropathy associated with type 2 diabetes mellitus ([facility] [address]    History of coronary artery bypass graft    Iron deficiency anemia    Spinal stenosis of lumbar region with neurogenic claudication    Mixed hyperlipidemia    Stage 4 chronic kidney disease (HCC)    Type 2 diabetes mellitus with kidney complication, with long-term current use of insulin (HCC)    Syncope and collapse    Obesity, Class II, BMI 35-39.9    Thyroid nodule greater than or equal to 1 cm in diameter incidentally noted on imaging study    Essential hypertension    Anemia in stage 4 chronic kidney disease (HCC)    Chronic ischemic heart disease    Acute cerebrovascular accident (CVA) ([facility] [address]       Measurements:  Wound 08/11/21 Buttocks Right;Posterior (Active)   Wound Image   08/12/21 1059   Wound Etiology Skin Tear 08/12/21 1059   Dressing Status Old drainage noted;New dressing applied 08/12/21 1059   Wound Cleansed Cleansed with saline 08/12/21 1059   Dressing/Treatment Foam 08/11/21 2300   Wound Length (cm) 0.7 cm 08/12/21 1059   Wound Width (cm) 0.6 cm 08/12/21 1059   Wound Depth (cm) 0.1 cm 08/12/21 1059   Wound Surface Area (cm^2) 0.42 cm^2 08/12/21 1059   Wound Volume (cm^3) 0.042 cm^3 08/12/21 1059   Wound Assessment Pink/red 08/12/21 1059   Drainage Amount Small 08/12/21 1059   Drainage Description Serosanguinous 08/12/21 1059   Odor None 08/12/21 1059   Elena-wound Assessment Dry/flaky 08/12/21 1059   Margins Defined edges 08/12/21 1059   Number of days: 0     WOC nurse consult for buttock wound. Wound is superficial and pink/red and appears to be a skin tear. Upon talking with the pt, she states that she has black, sticky stools and often has to wipe hard many times to get herself clean. This is most likely the how the wound developed. It is not over a bony prominence and pt is continent of both urine and stool.  Recommend opticell ag covered with foam.     Response to treatment: Well tolerated by patient. Plan:     Plan of Care:     Right buttock: Cleanse with soap and water, pat dry. Apply opticell ag (moisten with saline) to wound, cover with foam dressing. Change every other day and as needed if loose or soiled. -Turn every 2 hours    -Float heels off of bed with pillows under calves    -Sacral foam dressing to sacrococcygeal area. Peel back dressing, inspect skin beneath, re-secure. Change every 72 hours and prn wrinkles, soilage. Discontinue if repeatedly soiled by incontinence.     -Routine incontinence care with foaming cleanser and zinc oxide cream. Apply zinc oxide cream BID and prn incontinence. Use moisture wicking under pads vs cloth backed briefs    -Static air overlay. Check inflation each shift by sliding hand under the air overlay. Feel for the patient's heaviest/ most dependant body part. Ideally 1/2 to 1\" of air will be between your hand and the patient's body. Add air prn. Specialty Bed Required : N/A   [] Low Air Loss   [] Pressure Redistribution  [] Fluid Immersion  [] Bariatric  [] Total Pressure Relief  [] Other:     Current Diet: ADULT DIET;  Regular; 4 carb choices (60 gm/meal)  Dietician consult:  N/A    Discharge Plan:  Placement for patient upon discharge: home with support   Patient appropriate for Outpatient [address] [place] [address] Street: N/A    Patient/Caregiver Teaching:  Level of patientunderstanding able to:     [x] Indicates understanding       [] Needs reinforcement  [] Unsuccessful      [x] Verbal Understanding  [] Demonstrated understanding       [] No evidence of learning  [] Refused teaching         [] N/A       Electronically signed by Roney Jiang RN on  8/12/2021 at 11:01 AM

## 2021-08-12 NOTE — PROGRESS NOTES
Patient arrived on unit during day shift around 1800. Writer observed day nurse Renée Singh asking admission questions in patient room. Orders were put in [address], and writer called attending physician Dr. Keegan Moore to review orders. Internal medicine consulted. Patient is nervous about starting rehab and recovering from stroke, but pleasant.

## 2021-08-12 NOTE — CARE COORDINATION
Ezequiel Kidd, RN   Registered Nurse   Case Management   Progress Notes      Signed   Date of Service:  2021  2:42 PM               [facility]  Acute Inpatient Rehab Preadmission Assessment     Patient Name: Freeman Montes De Oca        MRN:   [id number]    : 1960  (59 y.o.)  Gender: female      Admitted from:   []?[facility]  []? [address]   []? [address]   []? [facility]   [x]? Outside Admission - Location:  [facility]                                 [x]? Initial         []? Updated     Date of Onset / Admission to the acute hospital:  21     Inpatient Rehabilitation Admitting Diagnosis:  CVA     Did patient have surgery/procedures? []? No  [x]? Yes:  Placement of Dialysis Catheter       Physicians: Zion Mccullough (Nephro), Goldie Singh (Admitting Hospitalist), Sarah Hernandez (Neuro), Yesenia Palacios (PMR), Prone (Urology)     Risk for clinical complications: Moderate     Co-morbidities:      1. Type 2 DM  2. AURELIA on CKD 4  3. CAD s/p CABG  4.  Cervical Stenosis  5. Back Pain  6. Lymphedema  7. Diastolic CHF  8. DVT  9. Urinary Retention  10. Iron Deficiency Anemia     Financial Information  Primary insurance:  []? Medicare     []? Medicare HMO      [x]? [employer]    []? Medicaid      []? Medicaid HMO       []? Workers Compensation        []? Personal Pay     Secondary Insurance:  []? Medicare     []? Medicare HMO      []? [employer]    []? Medicaid      []? Medicaid HMO        []? Workers Compensation      [x]? None     Precautions:   []? Cardiac Precautions:            []? Total hip precautions:           []? Weight Bearing status:  [x]? Safety Precautions/Concerns:  Fall Risk, General Precautions  []? Visually impaired:                 []?Hard of Hearing:      Isolation Precautions:         []? Yes              [x]? No  If Yes:   []? Droplet  []? Contact           []? Airborne     []? VRE     []? MRSA        []? C-diff         []? TB             []? ESBL         []? Rehab Nursing needs: Yes  Intense Interdisciplinary need:  Yes  Need for 24 hr physician supervision:  Yes  Measurable improved quality of life:  Yes  Willingness to participate:  Yes  Medical Necessity:  Yes  Patient able to tolerate care proposed:   Yes     Expected Discharge Destination/Functional Level:  Home with assist  Expected length of time to achieve that level of improvement: 1-2 weeks  Expected Post Discharge Treatments: Home with possible Home Care     Other information relevant to patient's care needs:  N/A     Acute Inpatient Rehabilitation Disclosure Statement will be provided to patient upon admission to ARU with patient's verbalization of understanding.       I have reviewed and concur with the findings and results of the pre-admission screening assessment completed by the Inpatient Rehabilitation Admissions Coordinator.                  Cosigned by: Navdeep Fernandez MD at 8/11/2021  3:52 PM

## 2021-08-12 NOTE — PROGRESS NOTES
Patient expressed concern about sleeping on intrajugular dialysis port. Writer consulted PCU nurse regarding concern, to which she replied the patient can sleep with the IJ port. Patient asked to have furniture in room rearranged that way she can watch television. PCA and writer moved the bed to corner diagonal from TV, with bedside table and dresser on opposite sides of bed. Patient satisfied with room arrangement.

## 2021-08-12 NOTE — H&P
ambulated from bed to hallway with w/c follow. Pt. asked to sit down due to back pain that increased with mobility. Transfers  Sit to Stand: Contact guard assistance  Stand to sit: Contact guard assistance  Bed to Chair: Contact guard assistance  Stand Pivot Transfers: Contact guard assistance  Lateral Transfers: Contact guard assistance  Comment: Transfers assessed with rolling walker. Pt requires min cues for hand placement. Steady, no LOB noted. Ambulation  Ambulation?: Yes  Ambulation 1  Surface: level tile  Device: Rolling Walker  Assistance: Contact guard assistance  Quality of Gait: Slightly unsteady, no LOB noted. Decrease step length, increase LLE shakiness. Distance: 20'x2  Comments: Lymphedema braces donned prior to gait. Pt. ambulated from bed to hallway with w/c follow. Pt. asked to sit down due to back pain that increased with mobility. Surface: level tile  Ambulation 1  Surface: level tile  Device: Rolling Walker  Assistance: Contact guard assistance  Quality of Gait: Slightly unsteady, no LOB noted. Decrease step length, increase LLE shakiness. Distance: 20'x2  Comments: Lymphedema braces donned prior to gait. Pt. ambulated from bed to hallway with w/c follow. Pt. asked to sit down due to back pain that increased with mobility. OT:   ADL  Feeding: Modified independent  (per pt report)  Grooming: Stand by assistance  UE Bathing: Stand by assistance  LE Bathing: Minimal assistance (Min A fot bottom while standing)  UE Dressing: Stand by assistance  LE Dressing: Moderate assistance (A with lymphedema wraps and socks)  Toileting: Minimal assistance  Additional Comments: OT facilitated pts engagement in showering on this date. OK per DEDE Clark. Dialysis port covered during shower. Pt utilized grab bars, tub bench, and hand held shower head. Pt demonstrated fear of falling during shower requesting OT A with bottom hygiene while standing.  Pt required assistance with lymphedema wraps and heart failure) (HCC)     Chronic kidney disease     Coronary atherosclerosis of artery bypass graft     Cramp of limb     Gallstones     Hypertension     Insomnia     Pneumonia     Type II or unspecified type diabetes mellitus with renal manifestations, not stated as uncontrolled(250.40)     Type II or unspecified type diabetes mellitus without mention of complication, not stated as uncontrolled     Unspecified vitamin D deficiency        Past Surgical History:      Procedure Laterality Date    ANKLE FRACTURE SURGERY      BREAST SURGERY      CARPAL TUNNEL RELEASE      CHOLECYSTECTOMY, OPEN N/A     CORONARY ARTERY BYPASS GRAFT      x3    HAND SURGERY      KNEE ARTHROSCOPY      right    TONSILLECTOMY         Allergies:    Adhesive tape, Ace inhibitors, Iv dye [iodides], and Metformin and related    Medications   Scheduled Meds:   traZODone  100 mg Oral Nightly    polyethylene glycol  17 g Oral Daily    febuxostat  40 mg Oral Daily    tamsulosin  0.4 mg Oral Daily    gabapentin  300 mg Oral BID    furosemide  80 mg Oral BID    pantoprazole  40 mg Oral QAM AC    ranolazine  1,000 mg Oral BID    sodium chloride  1 spray Each Nostril 6x Daily    insulin lispro  0-12 Units Subcutaneous TID WC    insulin lispro  0-6 Units Subcutaneous Nightly    apixaban  5 mg Oral BID    aspirin  81 mg Oral Daily    atorvastatin  80 mg Oral Nightly    busPIRone  7.5 mg Oral TID    carvedilol  6.25 mg Oral BID WC    ferrous sulfate  325 mg Oral BID WC    insulin glargine  48 Units Subcutaneous BID     Continuous Infusions:   dextrose       PRN Meds:.acetaminophen, senna, bisacodyl, glucose, dextrose, glucagon (rDNA), dextrose     Social History:  Dwelling House - 1 story. Steps to enter:  0,  Bed/bathroom level:  1. Lives with:   Mother, sisters assist with mother's care  Devices: Rollator, cane  Activity level:  normal activities of daily living  Social History     Socioeconomic History    Marital status: Single     Spouse name: None    Number of children: None    Years of education: None    Highest education level: None   Occupational History    None   Tobacco Use    Smoking status: Never Smoker    Smokeless tobacco: Never Used   Substance and Sexual Activity    Alcohol use: No    Drug use: No    Sexual activity: Not Currently   Other Topics Concern    None   Social History Narrative    None     Social Determinants of Health     Financial Resource Strain: Low Risk     Difficulty of Paying Living Expenses: Not hard at all   Food Insecurity:     Worried About [address] in the Last Year:     [address] [employer] in the Last Year:    Transportation Needs:     Lack of Transportation (Medical):  Lack of Transportation (Non-Medical):    Physical Activity:     Days of Exercise per Week:     Minutes of Exercise per Session:    Stress:     Feeling of Stress :    Social Connections:     Frequency of Communication with Friends and Family:     Frequency of Social Gatherings with Friends and Family:     Attends Temple Services:     Active Member of Clubs or Organizations:     Attends Club or Organization Meetings:     Marital Status:    Intimate Partner Violence:     Fear of Current or Ex-Partner:     Emotionally Abused:     Physically Abused:     Sexually Abused:        Family History:       Problem Relation Age of Onset    Diabetes Father     Heart Failure Father        Diagnostics:       CBC:   Recent Labs     08/12/21  0619   WBC 8.6   RBC 3.28*   HGB 10.6*   HCT 31.8*   MCV 96.9   RDW 17.0*        BMP:   Recent Labs     08/12/21  0619      K 3.9      CO2 25   BUN 57*   CREATININE 2.98*   GLUCOSE 215*     HbA1c:   Lab Results   Component Value Date    LABA1C 8.9 (H) 03/15/2021     BNP: No results for input(s): BNP in the last 72 hours. PT/INR: No results for input(s): PROTIME, INR in the last 72 hours. APTT: No results for input(s):  APTT in the last 72 hours.  CARDIAC ENZYMES: No results for input(s): CKMB, CKMBINDEX, TROPONINT in the last 72 hours. Invalid input(s): CKTOTAL;3  FASTING LIPID PANEL:  Lab Results   Component Value Date    CHOL 105 04/09/2015    HDL 43 04/09/2015    TRIG 168 04/09/2015     LIVER PROFILE: No results for input(s): AST, ALT, ALB, BILIDIR, BILITOT, ALKPHOS in the last 72 hours. Review of Systems:  CONSTITUTIONAL:  Denies fevers, chills, sweats or fatigue. EYES:  Denies diplopia, blind spots, blurring. HEENT:  Denies hearing loss, trouble chewing or swallowing. RESPIRATORY:  No wheezing, coughing, shortness of breath. CARDIOVASCULAR:  Denies chest pain, palpitations, lightheadedness. GASTROINTESTINAL:  Denies heartburn, nausea, diarrhea, abdominal pain. Some c/o constipation - had small hardened stool yesterday  GENITOURINARY:  No urgency, frequency, incontinence, dysuria. ENDOCRINE:  Denies hot or cold intolerance. MUSCULOSKELETAL:  Denies focal joint pain, back pain, neck pain. NEUROLOGICAL:  Denies focal numbness, tingling, balance loss, headache. BEHAVIOR/PSYCH:  Denies depression, anxiety, memory loss, insomnia. SKIN:  No ulcers, rash. Bruises. Physical Exam:  /62   Pulse 79   Temp 98.3 °F (36.8 °C) (Oral)   Resp 16   Ht 5' 5\" (1.651 m)   Wt 244 lb 12.8 oz (111 kg)   SpO2 95%   BMI 40.74 kg/m²     GEN: Well developed, well nourished, in NAD  HEENT:  NCAT. PERRL. EOMI. Mucous membranes pink and moist. R IJ triple lumen in place. PULM:  Clear to ausculation. No rales or rhonchi. Respirations WNL and unlabored. CV:  Regular rate rhythm. No murmurs or gallops. GI:  Abdomen soft. Nontender. Non-distended. BS + and equal.    NEUROLOGICAL: A&O x3. Sensation intact to light touch. DTRs 2+. CNs III-XII grossly intact. MSK:  Functional ROM all extremities. Motor testing 4+/5 key muscles LUE and LLE. 4/5 key muscles RUE and RLE. Alethea Rosenberg SKIN: Warm dry and intact. Good turgor.  Scattered ecchymosis BUEs.   EXTREMITIES:  No calf tenderness to palpation. Chronic stable BLE lymphedema. PSYCH: Mood WNL. Appropriately interactive. Affect WNL. Principal Diagnosis/plan:  The patient is a 59y.o. year old with ADL and Mobility deficits secondary to ischemic CVA. She will require close medical monitoring for the comorbidities listed below. She will benefit from intensive interdisciplinary therapies and rehab nursing care and is appropriate for inpatient rehabilitation. The post admission physician evaluation (ANALISA) is consistent with the pre-admission assessment. See above findings to reflect the elements required in the ANALISA. Patient's admitting condition is consistent with the findings of the preadmission assessment by the rehabilitation admissions coordinator. Diagnoses/plan:    1. Ischemic CVA L frontal lobe with mild R dominant hemiplegia:  PT/OT for gait, mobility, strengthening, endurance, ADLs, and self care. On ASA, atorvastatin  2. HTN/CAD/Angina/Diastolic CHF: on carvedilol, furosemide, Ranexa  3. Insomnia: on Trazodone - was taking 75 mg at home - will increase to 100 mg here. 4. Anxiety: on buspirone  5. DM with neuropathy: on lantus, sliding scale, gabapentin  6. CKD IV: Had HD at [place] [address]. Nephrology consulted to follow. Keeping triple lumen in place for now. 7. Anemia of chronic disease: on ferrous sulfate. Had IV iron at [facility]. 8. Urine retention: on flomax  9. Bowel Management: Miralax daily, senokot prn, dulcolax prn. Noting some constipation secondary to iron. 10. DVT Prophylaxis:  SCD's while in bed, AGUSTIN's and Eliquis  11. Internal Medicine for medical management      Estimated Length of Stay:  2 weeks.     Prognosis  fair    Goals    Home at [facility] at Discharge: None      Kathy Nayak MD     This note is created with the assistance of a speech recognition program.  While intending to generate a document that actually reflects the content of the visit, the document can still have some errors including those of syntax and sound a like substitutions which may escape proof reading. In such instances, actual meaning can be extrapolated by contextual diversion.

## 2021-08-12 NOTE — CONSULTS
 Coronary atherosclerosis of artery bypass graft     Cramp of limb     Gallstones     Hypertension     Insomnia     Pneumonia     Type II or unspecified type diabetes mellitus with renal manifestations, not stated as uncontrolled(250.40)     Type II or unspecified type diabetes mellitus without mention of complication, not stated as uncontrolled     Unspecified vitamin D deficiency        Past Surgical History:        Procedure Laterality Date    ANKLE FRACTURE SURGERY      BREAST SURGERY      CARPAL TUNNEL RELEASE      CHOLECYSTECTOMY, OPEN N/A     CORONARY ARTERY BYPASS GRAFT      x3    HAND SURGERY      KNEE ARTHROSCOPY      right    TONSILLECTOMY         Current Medications:    traZODone (DESYREL) tablet 100 mg, Nightly  acetaminophen (TYLENOL) tablet 650 mg, Q4H PRN  polyethylene glycol (GLYCOLAX) packet 17 g, Daily  senna (SENOKOT) tablet 17.2 mg, Daily PRN  bisacodyl (DULCOLAX) suppository 10 mg, Daily PRN  febuxostat (ULORIC) tablet 40 mg, Daily  tamsulosin (FLOMAX) capsule 0.4 mg, Daily  gabapentin (NEURONTIN) capsule 300 mg, BID  furosemide (LASIX) tablet 80 mg, BID  pantoprazole (PROTONIX) tablet 40 mg, QAM AC  ranolazine (RANEXA) extended release tablet 1,000 mg, BID  sodium chloride (OCEAN, BABY AYR) 0.65 % nasal spray 1 spray, 6x Daily  insulin lispro (HUMALOG) injection vial 0-12 Units, TID WC  insulin lispro (HUMALOG) injection vial 0-6 Units, Nightly  glucose (GLUTOSE) 40 % oral gel 15 g, PRN  dextrose 50 % IV solution, PRN  glucagon (rDNA) injection 1 mg, PRN  dextrose 5 % solution, PRN  apixaban (ELIQUIS) tablet 5 mg, BID  aspirin chewable tablet 81 mg, Daily  atorvastatin (LIPITOR) tablet 80 mg, Nightly  busPIRone (BUSPAR) tablet 7.5 mg, TID  carvedilol (COREG) tablet 6.25 mg, BID WC  ferrous sulfate (IRON 325) tablet 325 mg, BID WC  insulin glargine (LANTUS) injection vial 48 Units, BID        Allergies:  Adhesive tape, Ace inhibitors, Iv dye [iodides], and Metformin and related    Social History:   Social History     Socioeconomic History    Marital status: Single     Spouse name: Not on file    Number of children: Not on file    Years of education: Not on file    Highest education level: Not on file   Occupational History    Not on file   Tobacco Use    Smoking status: Never Smoker    Smokeless tobacco: Never Used   Substance and Sexual Activity    Alcohol use: No    Drug use: No    Sexual activity: Not Currently   Other Topics Concern    Not on file   Social History Narrative    Not on file     Social Determinants of Health     Financial Resource Strain: Low Risk     Difficulty of Paying Living Expenses: Not hard at all   Food Insecurity:     Worried About [address] in the Last Year:     [address] [place] in the Last Year:    Transportation Needs:     Lack of Transportation (Medical):  Lack of Transportation (Non-Medical):    Physical Activity:     Days of Exercise per Week:     Minutes of Exercise per Session:    Stress:     Feeling of Stress :    Social Connections:     Frequency of Communication with Friends and Family:     Frequency of Social Gatherings with Friends and Family:     Attends Sikhism Services:     Active Member of Clubs or Organizations:     Attends Club or Organization Meetings:     Marital Status:    Intimate Partner Violence:     Fear of Current or Ex-Partner:     Emotionally Abused:     Physically Abused:     Sexually Abused:        Family History:   Family History   Problem Relation Age of Onset    Diabetes Father     Heart Failure Father        Review of Systems:    Constitutional: No fever, no chills, no night sweats, fatigue, generalized weakness, loss of appetite  HEENT:  No headache, otalgia, itchy eyes, epistaxis, nasal discharge or sore throat.   Cardiac:  No chest pain, dyspnea, orthopnea or PND, palpitations  Chest:  No cough, hemoptysis, pleuritic chest pain, wheezing,SOB  Abdomen:  No abdominal pain, nausea, vomiting, diarrhea, melena, dysphagia hematemesis,constipation, abdominal bloating, flank pain  Neuro:  Acute CVA, no seizure like activity. Skin:   No rashes, no itching. :   No hematuria, no pyuria, no dysuria, no flank pain. Extremities:  Right-sided weakness due to recent stroke, bilateral leg edema      Objective:  CURRENT TEMPERATURE:  Temp: 98.3 °F (36.8 °C)  MAXIMUM TEMPERATURE OVER 24HRS:  Temp (24hrs), Av.1 °F (36.7 °C), Min:97.9 °F (36.6 °C), Max:98.3 °F (36.8 °C)    CURRENT RESPIRATORY RATE:  Resp: 16  CURRENT PULSE:  Pulse: 79  CURRENT BLOOD PRESSURE:  BP: 132/62  24HR BLOOD PRESSURE RANGE:  Systolic (95ZDC), ATA:247 , Min:123 , NNJ:680   ; Diastolic (97RTS), EXX:00, Min:51, Max:62    24HR INTAKE/OUTPUT:  No intake or output data in the 24 hours ending 21 1500  Patient Vitals for the past 96 hrs (Last 3 readings):   Weight   21 1754 244 lb 12.8 oz (111 kg)       Physical Exam:  GENERAL APPEARANCE: Alert and cooperative, and appears to be in no acute distress. HEAD: normocephalic  EYES: EOMI. Not pale, anicteric   NOSE:  No nasal discharge. THROAT:  Oral cavity and pharynx normal. Moist  CARDIAC: Normal S1 and S2. No S3, S4 or murmurs. Rhythm is regular. LUNGS: Clear to auscultation and percussion without rales, rhonchi, wheezing or diminished breath sounds. BACK: Examination of the spine reveals normal gait and posture, no spinal deformity, symmetry of spinal muscles, without tenderness, decreased range of motion or muscular spasm    MUSKULOSKELETAL: Adequately aligned spine. No joint erythema or tenderness. EXTREMITIES: 2+-3 edema. Peripheral pulses intact.    NEURO: Right-sided weakness      Labs:   CBC:  Recent Labs     21  0619   WBC 8.6   RBC 3.28*   HGB 10.6*   HCT 31.8*   MCV 96.9   MCH 32.3   MCHC 33.4   RDW 17.0*      MPV 8.4      BMP:   Recent Labs     21  0619      K 3.9      CO2 25   BUN 57*   CREATININE 2.98*   GLUCOSE 215*   CALCIUM 9.4      Phosphorus:  No results for input(s): PHOS in the last 72 hours. Magnesium: No results for input(s): MG in the last 72 hours. Albumin: No results for input(s): LABALBU in the last 72 hours. IRON:  No results for input(s): IRON in the last 72 hours. Iron Saturation:  Invalid input(s): PERCENTFE  TIBC:  No results for input(s): TIBC in the last 72 hours. FERRITIN:  No results for input(s): FERRITIN in the last 72 hours. SPEP: No results for input(s): SPEP in the last 72 hours. No results for input(s): PROT, ALBCAL, ALBCAL, ALBPCT, LABALPH, A1PCT, LABALPH, A2PCT, LABBETA, BETAPCT, GAMGLOB, GGPCT, PATH in the last 72 hours. UPEP: No results for input(s): TPU in the last 72 hours. Urine Sodium:  No results for input(s): JASON in the last 72 hours. Urine Potassium: No results for input(s): KUR in the last 72 hours. Urine Chloride: Invalid input(s): CLU  Urine Ph:  Invalid input(s): PO4U  Urine Osmolarity: No results for input(s): OSMOU in the last 72 hours. Urine Creatinine:  No results for input(s): LABCREA in the last 72 hours. Urine Eosinophils: Invalid input(s): EOSU  Urine Protein:  No results for input(s): TPU in the last 72 hours. Urinalysis:  No results for input(s): Atul Christal, PHUR, LABCAST, WBCUA, RBCUA, MUCUS, TRICHOMONAS, YEAST, BACTERIA, CLARITYU, SPECGRAV, LEUKOCYTESUR, UROBILINOGEN, BILIRUBINUR, BLOODU, GLUCOSEU, KETUA, AMORPHOUS in the last 72 hours. Radiology:  Reviewed as available. Assessment:  Patient was [facility] to [facility] on 8/1/2021 with acute frontal stroke right-sided weakness, patient developed acute kidney injury due to contrast-induced nephropathy serum creatinine peaked to 4.9 mg/dL patient was started on dialysis patient had dialysis on August 6 and 7, patient was transferred to acute rehab. 1.  AURELIA on CKD secondary to ATN contrast-induced nephropathy started on acute dialysis on August 6 and 7.   Serum creatinine today is 2.9 mg/dL    2.   2 diabetes insulin-dependent dependent diabetes mellitus. 3.  Essential hypertension. 4.  Acute CVA. Right-sided weakness with left frontal lobe ischemic stroke         Plan:  1. Strict I's and O's  2. Continue to monitor kidney function closely and evaluate daily for need for dialysis  3. BMP daily  4. If Kidney function serum creatinine continues to rise patient might need to continue dialysis. 5. No plans for dialysis today  6. Continue Lasix 80 mg twice daily  7. Bladder scan PVR to rule out urinary retention      Thank you for the consultation.       Electronically signed by [facility], MD on 8/12/2021 at 3:00 PM

## 2021-08-12 NOTE — PLAN OF CARE
Problem: Falls - Risk of:  Goal: Will remain free from falls  Outcome: Ongoing  Goal: Absence of physical injury  Outcome: Ongoing     Problem: Skin Integrity:  Goal: Will show no infection signs and symptoms  Outcome: Ongoing  Goal: Absence of new skin breakdown  Outcome: Ongoing     Problem: Pain:  Goal: Pain level will decrease  Outcome: Ongoing  Goal: Control of acute pain  Outcome: Ongoing  Goal: Control of chronic pain  Outcome: Ongoing     Problem: Musculor/Skeletal Functional Status  Goal: Highest potential functional level  Outcome: Ongoing  Goal: Absence of falls  Outcome: Ongoing     Problem: Nutrition  Goal: Optimal nutrition therapy  Outcome: Ongoing

## 2021-08-12 NOTE — PROGRESS NOTES
Physical Therapy  [address] [facility]   Acute Rehabilitation Physical Therapy Progress Note    Date: 21  Patient Name: Celestino Peterson       Room: 1583/9703-67  MRN: [id number]   Account: [de-identified]   : 1960  (59 y.o.) Gender: female     Referring Practitioner: Petra Garibay MD  Diagnosis: CVA  Past Medical History:  has a past medical history of Backache, unspecified, CHF (congestive heart failure) ([address] Chronic kidney disease, Coronary atherosclerosis of artery bypass graft, Cramp of limb, Gallstones, Hypertension, Insomnia, Pneumonia, Type II or unspecified type diabetes mellitus with renal manifestations, not stated as uncontrolled(250.40), Type II or unspecified type diabetes mellitus without mention of complication, not stated as uncontrolled, and Unspecified vitamin D deficiency. Past Surgical History:   has a past surgical history that includes Coronary artery bypass graft; Knee arthroscopy; Carpal tunnel release; Breast surgery; Tonsillectomy; Hand surgery; Ankle fracture surgery; and Cholecystectomy, open (N/A). Additional Pertinent Hx: Presented with acute right arm weakness and mental status change. MRI brain showed probable acute to subacute lacunar infarct of the left frontal lobe. [place] on CKD stage 4 likely d/t contrast induced nephropathy. Dialysis was initiated on 21, had 2 treatment session, labs improving. Pt. has history of diabetes mellitus type II, CAD s/p CABG (), cervical stenosis, back pain, lymphedema, diastolic CHF, DVT, urinary retention, and iron deficiency anemia. Pt. admitted to ARU from [place] [id number] 21.      Overall Orientation Status: Within Functional Limits  Restrictions/Precautions  Restrictions/Precautions: Fall Risk  Required Braces or Orthoses?: Yes (Lymphedema braces/wraps  )  Required Braces or Orthoses  Right Lower Extremity Brace:  (Lymphedema brace )  Left Lower Extremity Brace:  (Lymphedema brace )    Subjective: Pt. agreeable to therapy and pleasant to work with. PM tx times split due to MD rounding. Comments: Monitor vitals during activity. Pt' reports \"in a fog\" post stair training. Requires seated rest break, SPO2 is 89-90%. Recovers to 94% with seated rest break. Vital Signs  BP Location: Right upper arm  Level of Consciousness: Alert (0)  Patient Currently in Pain: Yes  Pain Assessment: 0-10  Pain Level: 2  Pain Type: Chronic pain  Pain Location: Back;Neck  Clinical Progression: Not changed     Oxygen Therapy  O2 Device: None (Room air)       Transfers:  Sit to Stand: Contact guard assistance  Stand to sit: Contact guard assistance   Comments: Transfers assessed with rolling walker. Pt demos good technique for hand placement. Steady, no LOB noted. Ambulation 1  Surface: level tile  Device: Rolling Walker  Assistance: Contact guard assistance  Quality of Gait: Slightly unsteady, no LOB noted. Decrease step length, increase LLE shakiness. Distance: 20'x2  Comments: Lymphedema braces donned prior to gait. Stairs/Curb  Stairs?: Yes  Stairs  # Steps : 5  Stairs Height: 4\" (and 6\")  Rails: Bilateral  Device: No Device  Assistance: Contact guard assistance  Comment: PT demos a step to gait pattern. Good foot clearance. PT reports feeling \"in a fog\". Seated rest break required. SPO2 is 89-90%. PT demos good breathing technique. BALANCE Posture: Good  Sitting - Static: Fair;+  Sitting - Dynamic: Fair  Standing - Static: Fair  Standing - Dynamic: Fair;-  Comments: Sitting balance assessed EOB, standing balance assessed with rolling walker and rollator     EXERCISES    Other exercises 5: Amb with high knees with RW 20x1. Increase LLE shakiness. Other exercises 6: standing BLE ex's to tolerance with RW. REps: 8-10x heel raises and hip flexion  Other exercises 7: Seated BLE ex's AROM. Reps: 10 each. LLE tremors during ex's.    Other Activities  Comment: rest breaks PRN. Split PM tx due to MD rounds. Activity Tolerance: Patient limited by pain, Patient limited by endurance  PT Equipment Recommendations  Equipment Needed: No       Patient Education  New Education Provided:    Learner:patient  Method: demonstration and explanation       Outcome: needs reinforcement     Current Treatment Recommendations: Strengthening, Balance Training, Transfer Training, Functional Mobility Training, ROM, Endurance Training, Gait Training, Stair training, Pain Management, Home Exercise Program, Safety Education & Training, Neuromuscular Re-education, Patient/Caregiver Education & Training, Equipment Evaluation, Education, & procurement    Conditions Requiring Skilled Therapeutic Intervention  Body structures, Functions, Activity limitations: Decreased functional mobility ; Decreased strength;Decreased endurance;Decreased balance; Increased pain  Assessment: Pt. presents after a CVA with decreased strength and balance, limiting functional mobility. Pt. with history of diabetic neuropathy and lymphedema. Pt. SBA for bed mobility and CGA for transfers using a rolling walker. Pt. ambulated 45ft. in 45s with CGA using a rolling walker, then 100ft. in 1:50 minutes with CGA using a rolling walker, then 148ft. in 2 minutes using a rollator. Pt. limited by upper and lower back pain and decreased endurance. Pt. displayed shortness of breath upon exertion. Treatment Diagnosis: CVA  Prognosis: Good  Decision Making: Medium Complexity  Exam: ROM, MMT,fucntional mobility , PASS score  Clinical Presentation: Pt. up in bed upon arrival. R internal jugular triple lumen catheter in for dialysis. Dialysis currently on hold due to improved kidney labs.    REQUIRES PT FOLLOW UP: Yes  Treatment Initiated : Bed mobility training, Transfer training, Gait training  Discharge Recommendations: Patient would benefit from continued therapy after discharge;Home with assist PRN    Goals  Short term goals  Time Frame for Short term goals: 5 days   Short term goal 1: Pt. to perform bed mobility independently. Short term goal 2: Pt. to tolerate 30 to 45 minutes of therapeutic exercise to improve strength and endurance for increased functional mobility. Short term goal 3: Pt. to improve seated static and dynamic balance to good. Short term goal 4: Pt. to improve standing static balance to fair+ and standing dynamic balance to fair. Short term goal 5: Pt. to ambulate 200ft. with supervision using a rollator. Short term goal 6: Pt. to ascend/descend 3 steps with CGA using one railing  Long term goals  Time Frame for Long term goals : By DC  Long term goal 1: Pt. to perform all transfers independently or with Mod-I. Long term goal 2: Pt. to improve standing static and dynamic balance to good. Long term goal 3: Pt. to ambulate 100ft. on an unlevel/ inclined surface with Mod-I using a rollator. Long term goal 4: Pt. to ascend/descend one flight of stairs with supervision   Long term goal 5: Pt. to ambulate 200ft. in 2 minutes with Mod-I using a rollator.     Long term goal 6: Pt. to improve PASS score from 24/36 to 35/36.        08/12/21 1239 08/12/21 1320   PT Individual Minutes   Time In 1239 1320   Time Out 1308 1330   Minutes 29 10       Electronically signed by Guero Martinez PTA on 8/12/21 at 3:08 PM EDT

## 2021-08-12 NOTE — CONSULTS
Comprehensive Nutrition Assessment    Type and Reason for Visit:  Initial, Positive Nutrition Screen, Consult (Wt loss; Evaluate and Treat)    Nutrition Recommendations/Plan: Add 3-4 gm Na Restriction to diet order. Nutrition Assessment:  Pt admitted to rehab due to ADL and Mobility deficits secondary to ischemic CVA. Pt states she has been eating fairly well, although appetite has been decreased for some time. States she likes to snack on things at home, such as pretzels. States she is familiar with the carbohydrate control diet, but would like more information on the renal diet. Brief education was intiated. Will await further decisions regarding future dialysis plans. Will add 3-4 gm sodium restriction to current diet order. Malnutrition Assessment:  Malnutrition Status:  No malnutrition    Context:  Acute Illness     Findings of the 6 clinical characteristics of malnutrition:  Energy Intake:  No significant decrease in energy intake  Weight Loss:  No significant weight loss     Body Fat Loss:  No significant body fat loss     Muscle Mass Loss:  No significant muscle mass loss    Fluid Accumulation:  1 - Mild (May not be related to nutritional status) Extremities   Strength:  Not Performed    Estimated Daily Nutrient Needs:  Energy (kcal):  3506-0269 based on [place] with 1.1-1.2 factor; Weight Used for Energy Requirements:  Admission     Protein (g):  79-85 based on 1.4-1.5 gm per kg; Weight Used for Protein Requirements:  Ideal          Nutrition Related Findings:  Edema: +1 RLE, LLE. Lantus. Glu: 215, Cr: 2.98, K: 3.9 (8/12), Phosphorus: 3.0 (7/16). Other meds and labs reviewed. Dialysis intiated 8/6. Wounds:  Skin Tears       Current Nutrition Therapies:    ADULT DIET;  Regular; 4 carb choices (60 gm/meal)    Anthropometric Measures:  · Height: 5' 5\" (165.1 cm)  · Current Body Weight: 244 lb 11.4 oz (111 kg)   · Admission Body Weight: 244 lb 11.4 oz (111 kg)    · Ideal Body Weight: 125 lbs; % Ideal Body Weight 195.8 %   · BMI: 40.7  · BMI Categories: Obese Class 3 (BMI 40.0 or greater)       Nutrition Diagnosis:   · Altered nutrition-related lab values related to renal dysfunction, endocrine dysfuntion as evidenced by lab values    Nutrition Interventions:   Food and/or Nutrient Delivery:  Modify Current Diet  Nutrition Education/Counseling:  Education initiated   Coordination of Nutrition Care:  Continue to monitor while inpatient    Goals:  Improvement in altered lab values       Nutrition Monitoring and Evaluation:   Behavioral-Environmental Outcomes:  None Identified   Food/Nutrient Intake Outcomes:  Diet Advancement/Tolerance  Physical Signs/Symptoms Outcomes:  Biochemical Data, Weight, Skin     Discharge Planning: Too soon to determine     Some areas of assessment may be incomplete due to standard COVID-19 Precautions. Darby Gordon R.D., L.D.   Phone: [phone]

## 2021-08-12 NOTE — PLAN OF CARE
Nutrition Problem #1: Altered nutrition-related lab values  Intervention: Food and/or Nutrient Delivery: Modify Current Diet  Nutritional Goals: Improvement in altered lab values

## 2021-08-12 NOTE — PROGRESS NOTES
Speech Language Pathology  Facility/Department: [facility] ACUTE REHAB  Initial Speech/Language/Cognitive Assessment    NAME: Peterson Marte  : 1960   MRN: [id number]  ADMISSION DATE: 2021  ADMITTING DIAGNOSIS: has Coronary artery disease; Chronic diastolic heart failure ([address] Diabetic polyneuropathy associated with type 2 diabetes mellitus ([address] History of coronary artery bypass graft; Iron deficiency anemia; Spinal stenosis of lumbar region with neurogenic claudication; Mixed hyperlipidemia; Stage 4 chronic kidney disease ([address] Type 2 diabetes mellitus with kidney complication, with long-term current use of insulin ([address] Syncope and collapse; Obesity, Class II, BMI 35-39.9; Thyroid nodule greater than or equal to 1 cm in diameter incidentally noted on imaging study; Essential hypertension; Anemia in stage 4 chronic kidney disease ([address] Chronic ischemic heart disease; and Acute cerebrovascular accident (CVA) ([facility] on their problem list.    Date of Eval: 2021   Evaluating Therapist: BRAYAN Carvajal    RECENT RESULTS  CT OF HEAD/MRI:       Primary Complaint:   Per ARU preadmission assessment:   Presented on  to [facility] with acute right arm weakness and mental status change.  MRI brain showed probable acute to subacute lacunar infarct of the left frontal lobe. [place] on CKD stage 4 likely d/t contrast induced nephropathy.  Currently hemodialysis dependent, and dialysis was initiated on 21. Pain:  Pain Assessment  Pain Assessment: 0-10  Pain Level: 2  Pain Type: Chronic pain  Pain Location: Back, Neck  Pain Orientation: Upper, Lower  Pain Descriptors: Constant  Pain Frequency: Continuous  Pain Onset: On-going  Clinical Progression: Not changed    Assessment:      Diagnosis: Pt. demonstrates functional speech, language and swallowing (no overt s/s demonstrated on sips thin liquid via cup).   Pt. demonstrated mildly impaired cognition for paragraph recall and high level deductive reasoning. Treatment recommended for cognition to bring ADLs to a functional level. Recommendations:  Requires SLP Intervention: Yes             Plan:   Goals:  Short-term Goals  Goal 1: Increase recall for extended lengths of information (paragraph level) to 90%  Goal 2: Increase high level deductive reasoning to 90%   Patient/family involved in developing goals and treatment plan: family not present    Subjective:   Previous level of function and limitations: Independent        Vision  Vision: Impaired  Vision Exceptions: Wears glasses for reading  Hearing  Hearing: Within functional limits           Objective:     Oral/Motor  Oral Motor: Within functional limits    Auditory Comprehension  Comprehension: Within Functional Limits         Expression  Primary Mode of Expression: Verbal    Verbal Expression  Verbal Expression: Within functional limits         Motor Speech  Motor Speech: Within Functional Limits         Cognition:      Orientation  Overall Orientation Status: Within Normal Limits  Attention  Attention: Within Functional Limits  Memory  Memory: Exceptions to [facility]  Short-term Memory: Mild (paragraph)  Working Memory: Mild (paragraph)  Problem Solving  Problem Solving: Within Functional Limits  Abstract Reasoning  Abstract Reasoning: Exceptions to [facility] (mildly impaired high level deductive reasoning)  Safety/Judgement  Safety/Judgement: Within Functional Limits    Education:  Patient Education Response: Verbalizes understanding          Therapy Time:   Individual Concurrent Group Co-treatment   Time In 1330         Time Out 1400         Minutes 30               Mady SPRINGER A.CCC/SLP      8/12/2021 2:53 PM

## 2021-08-12 NOTE — CARE COORDINATION
CASE MANAGEMENT NOTE:    Admission Date:  8/11/2021 Terry Galeas is a 59 y.o.  female    Admitted for : Acute cerebrovascular accident (CVA) ([facility] [address] [I63.9]    Met with:  Patient      PCP:  AFSANEH Box                              Insurance:  [employer] [place]      Current Residence/ Living Arrangements:  independently at home             Current Services PTA:  No    Is patient agreeable to VNS: Yes    Freedom of choice provided:  Yes    List of [address] provided: No    Home Health/ Out pt therapy chosen:  Yes- Pt stated she has used [facility] in the past.     DME:  4 w/w, Straight Cane, built shower chair    Home Oxygen: No    Nebulizer: No    CPAP/BIPAP: No    Supplier: N/A    Potential Assistance Needed: No    SNF needed: No    Freedom of choice and list provided: NA    Pharmacy:  [address] [place]        Does Patient want to use MEDS to BEDS? Yes    Is patient currently receiving oral anticoagulation therapy? Yes- eliquis    Is the Patient an [facility] with Readmission Risk Score greater than 14%? Yes- 22%  If yes, pt needs a follow up appointment made within 7 days. Family Members/Caregivers that pt would like involved in their care:    Yes    If yes, list name here:  Dylan Gordon and Annette Pritchard [address]    Transportation Provider:  Family        Handicap placard:  Pt has handicap placard          Is patient in Isolation/One on One/Altered Mental Status? No  If yes, skip next question. If no, would they like an I-Pad to  use? No  If yes, call [phone]. Mental Health History: Pt identified anxiety and is currently taking medications prescribed by PCP    Substance use: Pt denied    Discharge Plan:  Pt lives independently at home and her mother lives with her. Her father lives at [facility] and she was visiting with him daily. Pt has 2 sisters who are helpful and supportive. Pt had home health in the past; she believes she had [facility]. [facility]; they have no record of pt. [address];  left. Patient Health Questionnaire-9 (PHQ-9)    Over the last 2 weeks, how often have you been bothered by any of the following problems? 1. Little Interest or pleasure in doing things? [x] Not at all  [] Several Days  [] More than half the day  []  Nearly every day    2. Feeling down, depressed or hopeless? [] Not at all  [x] Several Days  [] More than half the day  []  Nearly every day    3. Trouble falling or staying asleep, or sleeping too much? [] Not at all  [] Several Days  [] More than half the day  [x]  Nearly every day    4. Feeling tired or having little energy? [] Not at all  [x] Several Days  [] More than half the day  []  Nearly every day    5. Poor apettite or overeating? [x] Not at all  [] Several Days  [] More than half the day  []  Nearly every day    6. Feeling bad about yourself-or that you are a failure or have let yourself or your family down? [x] Not at all  [] Several Days  [] More than half the day  []  Nearly every day    7. Trouble concentrating on things, such as reading the newspaper or watching television? [] Not at all  [] Several Days  [x] More than half the day  []  Nearly every day    8. Moving or speaking so slowly that other people could have noticed? Or the opposite-being so fidgety or restless that you have been moving around a lot more than usual?   [x] Not at all  [] Several Days  [] More than half the day  []  Nearly every day    9. Thoughts that you would be better off dead or of hurting yourself in some way? [x] Not at all  [] Several Days  [] More than half the day  []  Nearly every day    Total Score: 4 minimal s/s of depression. Pt attributed much of her concerns linked with the CVA. Pt did expressed frustration with her sleeping pattern and being sleepy during the day. Pt to discuss with physician. Pt denied SI or thoughts of self harm.      If you checked off any problems, how difficult have these problems made it for you to do your work, take care of things at home, or get along with other people?    [] Not difficult at all  [x] Somewhat Difficult  [] Very Difficult  []  Extremely Difficult       Electronically signed by: [place] Moreno on 8/12/2021 at 8:41 AM

## 2021-08-12 NOTE — PROGRESS NOTES
[address]   Acute Rehabilitation OT Evaluation  Date: 21  Patient Name: Laisha Montes       Room: 6210/3131-68  MRN: [id number]  Account: [de-identified]   : 1960  (59 y.o.) Gender: female     Referring Practitioner: Em Thompson MD  Diagnosis: CVA  Additional Pertinent Hx: Presented with acute right arm weakness and mental status change. MRI brain showed probable acute to subacute lacunar infarct of the left frontal lobe. AURELIA on CKD stage 4 likely d/t contrast induced nephropathy. Dialysis was initiated on 21, had 2 treatment session, labs improving. Pt. has history of diabetes mellitus type II, CAD s/p CABG (), cervical stenosis, back pain, lymphedema, diastolic CHF, DVT, urinary retention, and iron deficiency anemia. Pt. admitted to ARU from [place] [id number] 21. Treatment Diagnosis: Impaired self care status   Past Medical History:  has a past medical history of Backache, unspecified, CHF (congestive heart failure) ([address] Chronic kidney disease, Coronary atherosclerosis of artery bypass graft, Cramp of limb, Gallstones, Hypertension, Insomnia, Pneumonia, Type II or unspecified type diabetes mellitus with renal manifestations, not stated as uncontrolled(250.40), Type II or unspecified type diabetes mellitus without mention of complication, not stated as uncontrolled, and Unspecified vitamin D deficiency. Past Surgical History:   has a past surgical history that includes Coronary artery bypass graft; Knee arthroscopy; Carpal tunnel release; Breast surgery; Tonsillectomy; Hand surgery; Ankle fracture surgery; and Cholecystectomy, open (N/A).     Restrictions  Restrictions/Precautions: Fall Risk  Required Braces or Orthoses  Right Lower Extremity Brace:  (Lymphedema brace )  Left Lower Extremity Brace:  (Lymphedema brace )  Required Braces or Orthoses?: Yes (Lymphedema braces/wraps  )    Vitals  Temp: 98.3 °F (36.8 °C)  Pulse: 79  Resp: 16  BP: 132/62  Height: 5' 5\" (165.1 cm)  Weight: 244 lb 12.8 oz (111 kg)  BMI (Calculated): 40.8  Oxygen Therapy  SpO2: 95 %  O2 Device: None (Room air)  Level of Consciousness: Alert (0)    Subjective  Subjective: \"I would like to have energy and balance and to just be able to walk around and go to the store and walk around. \" pt stated inregards to ARU goals  Comments: Pt was pleasant and agreeable to OT eval. Pt requesting to take a shower. Ok per [employer] for shower with dialysis port covered. Pain Level: 2  Pain Location: Back, Neck  Orientation  Overall Orientation Status: Within Functional Limits  Vision  Vision: Impaired  Vision Exceptions: Wears glasses for reading  Hearing  Hearing: Within functional limits  Social/Functional History  Lives With: Family (Lives with mother )  Type of Home:  (Condo )  Home Layout: One level  Home Access: Ramped entrance  Bathroom Shower/Tub: Walk-in shower, Doors (Threshold to step over )  Bathroom Toilet: Handicap height  Bathroom Equipment: Grab bars in shower, Built-in shower seat (Pt. reports using wall of shower to get off toilet)  Bathroom Accessibility: Walker accessible  Home Equipment: 4 wheeled walker, Cane (Pt. 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 uses motorized cart at [employer], sister does grocery shopping typically. Additional Comments: Pt. sleeps in flat bed at home, bedroom/living room is carpet, kitchen/bathroom linoleum. Pt. looking into getting hand-held shower head. Pt.'s mother becoming less mobile, pt.'s sister helps out mother. Pt. reports that 2 sisters can assist. Pt.s father in [facility] home. Pt. recently had 2 sessions of dialysis at [facility], hasn't had it for 2 days as kidney labs are improving. Pt reports that her 2 sisters live close by and that her one sister is retired and able to assist as needed. Pain Assessment  Pain Assessment: 0-10  Pain Level: 2  Pain Type: Chronic pain  Pain Location: Back, Neck  Pain Descriptors: Constant  Pain Frequency: Continuous  Clinical Progression: Not changed    Objective      Cognition  Overall Cognitive Status: Impaired  Additional Comments: Pt reports that she feels like she is in a fog and has difficulty with word finding. Sensation  Overall Sensation Status: Impaired (Numbness in B feet/calves 2* diabetic neuropathy)     UE Function           LUE Strength  Gross LUE Strength: WFL  L Hand General: 4-/5  LUE Strength Comment: Grossly 4-/5  Left Hand Strength -  (lbs)  Handle Setting 2: Avg 35# (Norm: 35-57)  strength completed by GARCIA with OT interpretation   LUE Tone: Normotonic     LUE AROM (degrees)  LUE AROM : WFL     Left Hand AROM (degrees)  Left Hand AROM: WFL  RUE Strength  Gross RUE Strength: WFL  R Hand General: 3+/5  RUE Strength Comment: Grossly 3+/5   Right Hand Strength -  (lbs)  Handle Setting 2: Avg 31# (Norm: 35-57)  strength completed by GARCIA with OTR interpretation   RUE Tone: Normotonic     RUE AROM (degrees)  RUE AROM : WFL     Right Hand AROM (degrees)  Right Hand AROM: WFL               9-Hole Peg test completed by GARCIA with OTR interpretation  Left 9-Hole Peg Test: Impaired  Left 9 Hole Peg Test Time (secs): 31.27 (Norm: 19-28s)  Right 9-Hole Peg Test: Functional  Right 9 Hole Peg Test Time (secs): 24.74 (Norm: 19-28s)         Fine Motor Skills  Coordination  Movements Are Fluid And Coordinated:  Yes                           Mobility          Balance  Sitting Balance: Stand by assistance  Standing Balance: Contact guard assistance  Standing Balance  Time: 1-2 minutes x 4  Activity: Functional transfers, functional mobility, lower body dressing, lower body bathing  Comment: with RW  Functional Mobility  Functional - Mobility Device: Rolling Walker  Activity: To/from bathroom  Assist Level: Contact guard assistance  Functional Mobility Comments: Verbal cues for hand placement and safety  Bed mobility  Comment: Pt sitting in wheelchair at the start and end of session     Transfers  Sit to stand: Contact guard assistance  Stand to sit: Contact guard assistance  Transfer Comments: Verbal cues for hand placement and safety  Toilet Transfers  Toilet - Technique: Ambulating  Equipment Used: Raised toilet seat with rails  Toilet Transfer: Contact guard assistance  Toilet Transfers Comments: Increased time with verbal cues for hand placement and safety  Shower Transfers  Shower - Transfer To: Transfer tub bench  Shower - Technique: Ambulating  Shower Transfers: Contact Guard  Shower Transfers Comments: Increased time with verbal cues for safety over threshold. Functional Activity Tolerance  Functional Activity Tolerance: Tolerates 30 min exercise with multiple rests   Activity Tolerance: Patient limited by fatigue         ADL     ADL  Feeding: Modified independent  (per pt report)  Grooming: Stand by assistance  UE Bathing: Stand by assistance  LE Bathing: Minimal assistance (Min A fot bottom while standing)  UE Dressing: Stand by assistance  LE Dressing: Moderate assistance (A with lymphedema wraps and socks)  Toileting: Minimal assistance  Additional Comments: OT facilitated pts engagement in showering on this date. OK per DEDE Morgan. Dialysis port covered during shower. Pt utilized grab bars, tub bench, and hand held shower head. Pt demonstrated fear of falling during shower requesting OT A with bottom hygiene while standing.  Pt required assistance with lymphedema wraps and socks at this time due to increased dizziness with bending bartolome. Pt currently limited due to decreased strength, activity tolerance, balance, and body habitus impacting safety and independence with self care tasks. OT scores     Eating  Assistance Needed: Independent (Per pt report)  CARE Score: 6  Discharge Goal: Independent  Oral Hygiene  Assistance Needed: Supervision or touching assistance  CARE Score: 4  Discharge Goal: Independent  Toileting Hygiene  Assistance Needed: Partial/moderate assistance  CARE Score: 3  Discharge Goal: Independent  Shower/Bathe Self  Assistance Needed: Partial/moderate assistance  CARE Score: 3  Discharge Goal: Independent  Upper Body Dressing  Assistance Needed: Supervision or touching assistance  CARE Score: 4  Discharge Goal: Independent  Lower Body Dressing  Assistance Needed: Partial/moderate assistance  CARE Score: 3  Discharge Goal: Independent  Putting On/Taking Off Footwear  Assistance Needed: Substantial/maximal assistance  CARE Score: 2  Discharge Goal: Independent  Toilet Transfer  Assistance Needed: Supervision or touching assistance  CARE Score: 4  Discharge Goal: Independent      Goals  Patient Goals   Patient goals : \"I would like to have energy and balance and to just be able to walk around and go to the store and walk around. \"   Short term goals  Time Frame for Short term goals: By 5-6 days  Short term goal 1: Pt will complete lower body dressing with CGA and good safety  Short term goal 2: Pt will complete functional trasnfers/mobility during self care tasks with supervision and good safety with use of least restrictive device  Short term goal 3: Pt will tolerate standing 5+ minutes during functional activity of choice with good safety   Short term goal 4: Pt will verbalize/demonstrate good understanding of energy conservation techniques to increase safety and independence with self care tasks  Short term goal 5: Pt will participate in 15+ minutes of therapeutic exercises/functional activities to increase safety and independence with self care tasks. Long term goals  Time Frame for Long term goals : By discharge  Long term goal 1: Pt will complete BADLs with modified independence and good safety  Long term goal 2: Pt will complete functional transfers/mobility during self care tasks with modified independence and good safety with use of least restrictive device  Long term goal 3: Pt will tolerate standing 8+ minutes during functional activity of choice with good safety  Long term goal 4: Pt will complete simple meal prep/light house keeping task with modified independence and good safety   Long term goal 5: Pt will verbalize/demonstrate good understanding of home safety/fall prevention to increase safety and independence with self care tasks.    Long term goals 6: Pt will demonstrated increased  strength and coordination during self care tasks as evident by and an improvement on the 9 hole peg test by 3 seconds and increased  strength by 5#    Assessment  Performance deficits / Impairments: Decreased ADL status, Decreased functional mobility , Decreased strength, Decreased safe awareness, Decreased cognition, Decreased endurance, Decreased balance, Decreased high-level IADLs, Decreased coordination  Treatment Diagnosis: Impaired self care status  Prognosis: Good  Decision Making: Medium Complexity  REQUIRES OT FOLLOW UP: Yes  Discharge Recommendations: Patient would benefit from continued therapy after discharge, Home with assist PRN  Plan  Times per week: 5-7  Times per day: Twice a day  Current Treatment Recommendations: Self-Care / ADL, Strengthening, ROM, Balance Training, Functional Mobility Training, Endurance Training, Neuromuscular Re-education, Cognitive Reorientation, Pain Management, Safety Education & Training, Patient/Caregiver Education & Training, Equipment Evaluation, Education, & procurement, Home Management Training, Cognitive/Perceptual Training       Equipment Recommendations  Equipment Needed: (TBD)     08/12/21 0952   OT Individual Minutes   Time In 2369   Time Out 6713   Minutes 96   Time Code Minutes    Timed Code Treatment Minutes 76 Minutes     Electronically signed by Aakash Easley OT on 8/12/21 at 3:05 PM EDT

## 2021-08-12 NOTE — PROGRESS NOTES
[address]   ACUTE REHABILITATION OCCUPATIONAL THERAPY  DAILY NOTE    Date: 21  Patient Name: Terry Galeas      Room: 9015/1114-54    MRN: [id number]   : 1960  (59 y.o.)  Gender: female   Referring Practitioner: Vamsi Weems MD  Diagnosis: CVA  Additional Pertinent Hx: Presented with acute right arm weakness and mental status change. MRI brain showed probable acute to subacute lacunar infarct of the left frontal lobe. AURELIA on CKD stage 4 likely d/t contrast induced nephropathy. Dialysis was initiated on 21, had 2 treatment session, labs improving. Pt. has history of diabetes mellitus type II, CAD s/p CABG (), cervical stenosis, back pain, lymphedema, diastolic CHF, DVT, urinary retention, and iron deficiency anemia. Pt. admitted to ARU from [place] [address] 21. Restrictions  Restrictions/Precautions: Fall Risk  Required Braces or Orthoses  Right Lower Extremity Brace:  (Lymphedema brace )  Left Lower Extremity Brace:  (Lymphedema brace )  Required Braces or Orthoses?: Yes    Subjective  Comments: Pt pleasant and agreeable to session although very fatigued throughout, noted eyes closed x2. Pt declined returning to bed follwoing session, \"I hate to be in bed all day\". Pt was educated on safety with fatigue level reinforced on awareness for call light if choosing to return to bed. Pt verbalized good understanding. Patient Currently in Pain: No  Restrictions/Precautions: Fall Risk  Overall Orientation Status: Within Functional Limits        Objective  Cognition  Overall Cognitive Status: WFL  Additional Comments: no  noted deficits with this tx session. Perception  Overall Perceptual Status: WFL              Assessment  Performance deficits / Impairments: Decreased ADL status; Decreased functional mobility ; Decreased strength;Decreased safe awareness;Decreased cognition;Decreased endurance;Decreased balance;Decreased high-level IADLs;Decreased coordination  Prognosis: Good  Discharge Recommendations: Patient would benefit from continued therapy after discharge;Home with assist PRN  Activity Tolerance: Patient limited by fatigue;Patient Tolerated treatment well  Safety Devices in place: Yes  Type of devices: All fall risk precautions in place;Call light within reach;Gait belt;Patient at risk for falls; Left in chair;Nurse notified  Equipment Recommendations  Equipment Needed:  (TBD)           Left Hand Strength -  (lbs)  Handle Setting 2: 33, 35, 36; average 34.6# (NORMS 35-57)        Right Hand Strength -  (lbs)  Handle Setting 2: 30, 34, 30; average 31.3# (NORMS 35-57#)        Fine Motor Skills  Left 9-Hole Peg Test: Impaired  Left 9 Hole Peg Test Time (secs): 31.27  Right 9-Hole Peg Test: Functional  Right 9 Hole Peg Test Time (secs): 24.73  Fine Motor Comment: NORMS 19-28 secs. These assessments were completed by writer and provided to evaluating OTR for assessment towards fucntional goals. Plan  Plan  Times per week: 5-7  Times per day: Twice a day  Current Treatment Recommendations: Self-Care / ADL, Strengthening, ROM, Balance Training, Functional Mobility Training, Endurance Training, Neuromuscular Re-education, Cognitive Reorientation, Pain Management, Safety Education & Training, Patient/Caregiver Education & Training, Equipment Evaluation, Education, & procurement, Home Management Training, Cognitive/Perceptual Training  Patient Goals   Patient goals : \"I would like to have energy and balance and to just be able to walk around and go to the store and walk around. \"   Short term goals  Time Frame for Short term goals: By 5-6 days  Short term goal 1: Pt will complete lower body dressing with CGA and good safety  Short term goal 2: Pt will complete functional trasnfers/mobility during self care tasks with supervision and good safety with use of least restrictive device  Short term goal 3: Pt will tolerate standing 5+ minutes during functional activity of choice with good safety   Short term goal 4: Pt will verbalize/demonstrate good understanding of energy conservation techniques to increase safety and independence with self care tasks  Short term goal 5: Pt will participate in 15+ minutes of therapeutic exercises/functional activities to increase safety and independence with self care tasks. Long term goals  Time Frame for Long term goals : By discharge  Long term goal 1: Pt will complete BADLs with modified independence and good safety  Long term goal 2: Pt will complete functional transfers/mobility during self care tasks with modified independence and good safety with use of least restrictive device  Long term goal 3: Pt will tolerate standing 8+ minutes during functional activity of choice with good safety  Long term goal 4: Pt will complete simple meal prep/light house keeping task with modified independence and good safety   Long term goal 5: Pt will verbalize/demonstrate good understanding of home safety/fall prevention to increase safety and independence with self care tasks.    Long term goals 6: Pt will demonstrated increased  strength and coordination during self care tasks as evident by and an improvement on the 9 hole peg test by 3 seconds and increased  strength by 5#        08/12/21 1611   OT Individual Minutes   Time In 1429   Time Out 1447   Minutes 18     Electronically signed by DAYANA Olguin on 8/12/21 at 4:20 PM EDT

## 2021-08-12 NOTE — DISCHARGE INSTR - COC
Continuity of Care Form    Patient Name: Andi To   :  1960  MRN:  [id number]    Admit date:  2021  Discharge date:  21    Code Status Order: Full Code   Advance Directives:      Admitting Physician:  Jason Strong MD  PCP: AFSANEH Payton - CNP    Discharging Nurse: Darren Subramanian RN  [address] Unit/Room#: 5375/4353-90  Discharging Unit Phone Number: [phone]    Emergency Contact:   Extended Emergency Contact Information  Primary Emergency Contact:  [address], [address] Phone: [phone]  Relation: Brother/Sister  Secondary Emergency Contact: Brianda Whyte, [address] Phone: [phone]  Relation: Brother/Sister    Past Surgical History:  Past Surgical History:   Procedure Laterality Date    ANKLE FRACTURE SURGERY      BREAST SURGERY      CARPAL TUNNEL RELEASE      CHOLECYSTECTOMY, OPEN N/A     CORONARY ARTERY BYPASS GRAFT      x3    HAND SURGERY      KNEE ARTHROSCOPY      right    TONSILLECTOMY         Immunization History:   Immunization History   Administered Date(s) Administered    COVID-19, Pfizer, PF, 30mcg/0.3mL 2021, 2021    Influenza Virus Vaccine 10/18/2018, 09/10/2019, 2020    Influenza, [place] Blight, IM, (6 mo and older Fluzone, Flulaval, Fluarix and 3 yrs and older Afluria) 10/16/2017    Influenza, [place] Blight, IM, PF (6 mo and older Fluzone, Flulaval, Fluarix, and 3 yrs and older Afluria) 10/18/2018, 09/10/2019, 2019, 2020    Pneumococcal Conjugate 13-valent (Flordia Reel) 2019    Pneumococcal Polysaccharide ([id number]) 10/30/2014    Tdap (Boostrix, Adacel) 2017       Active Problems:  Patient Active Problem List   Diagnosis Code    Coronary artery disease I25.10    Chronic diastolic heart failure ([facility] [address] I50.32    Diabetic polyneuropathy associated with type 2 diabetes mellitus (HCC) E11.42    History of coronary artery bypass graft Z95.1    Iron deficiency anemia D50.9    Spinal stenosis of lumbar region with Wound Length (cm) 0.7 cm 08/12/21 1059   Wound Width (cm) 0.6 cm 08/12/21 1059   Wound Depth (cm) 0.1 cm 08/12/21 1059   Wound Surface Area (cm^2) 0.42 cm^2 08/12/21 1059   Wound Volume (cm^3) 0.042 cm^3 08/12/21 1059   Wound Assessment Pink/red 08/12/21 1059   Drainage Amount Small 08/12/21 1059   Drainage Description Serosanguinous 08/12/21 1059   Odor None 08/12/21 1059   Elena-wound Assessment Dry/flaky 08/12/21 1059   Margins Defined edges 08/12/21 1059   Number of days: 0     Right buttock: Cleanse with soap and water, pat dry. Apply opticell ag (moisten with saline) to wound, cover with foam dressing. Change every other day and as needed if loose or soiled. Elimination:  Continence:   · Bowel: Yes  · Bladder: Yes  Urinary Catheter: None   Colostomy/Ileostomy/Ileal Conduit: No       Date of Last BM: 8/25/21  No intake or output data in the 24 hours ending 08/12/21 1130  No intake/output data recorded. Safety Concerns: At Risk for Falls    Impairments/Disabilities:      None    Nutrition Therapy:  Current Nutrition Therapy:   - Oral Diet:  Carb Control 4 carbs/meal (1800kcals/day) and Renal    Routes of Feeding: Oral  Liquids: Thin Liquids  Daily Fluid Restriction: no  Last Modified Barium Swallow with Video (Video Swallowing Test): not done    Treatments at the Time of Hospital Discharge:   Respiratory Treatments: n/a  Oxygen Therapy:  is not on home oxygen therapy. Ventilator:    - No ventilator support    Rehab Therapies: Physical Therapy and Occupational Therapy  Weight Bearing Status/Restrictions: No weight bearing restirctions  Other Medical Equipment (for information only, NOT a DME order):  wheelchair and walker  Other Treatments: Dialysis Monday, Wednesday, and Friday. Keep dialysis catheter clean and dry.      Patient's personal belongings (please select all that are sent with patient):  Rose Marie    RN SIGNATURE:  Electronically signed by Catherine Thomas RN on 8/26/21 at 12:19 PM EDT    CASE MANAGEMENT/SOCIAL WORK SECTION    Inpatient Status Date: 8/11/2021  Readmission Risk Assessment Score:  Readmission Risk              Risk of Unplanned Readmission:  22           Discharging to Facility/ Agency    Name: [address] [place] [address] [address], [address] [address]   Phone: [phone]  · Fax: [phone]    Dialysis Facility (if applicable)     · [facility]  · [address] [place], [address]  · Dialysis Schedule: MWF at 12:15 PM  · Phone: [phone]  · Fax: [phone]      / signature: Electronically signed by [place] on 8/20/21 at 10:46 AM EDT    PHYSICIAN SECTION    Prognosis: Good    Condition at Discharge: Stable    Rehab Potential (if transferring to Rehab): Good    Recommended Labs or Other Treatments After Discharge: Physical and occupational therapy for ongoing functional deficits related to CVA. Nursing for medication management. Physician Certification: I certify the above information and transfer of Laisha Montes  is necessary for the continuing treatment of the diagnosis listed and that she requires [address] for less than 30 days.      Update Admission H&P: No change in H&P    PHYSICIAN SIGNATURE:  Electronically signed by Safia Quezada MD on 8/25/21 at 11:12 PM EDT

## 2021-08-13 LAB
ANION GAP SERPL CALCULATED.3IONS-SCNC: 12 MMOL/L (ref 9–17)
BUN BLDV-MCNC: 65 MG/DL (ref 8–23)
BUN/CREAT BLD: ABNORMAL (ref 9–20)
CALCIUM SERPL-MCNC: 9.5 MG/DL (ref 8.6–10.4)
CHLORIDE BLD-SCNC: 100 MMOL/L (ref 98–107)
CO2: 27 MMOL/L (ref 20–31)
CREAT SERPL-MCNC: 3.71 MG/DL (ref 0.5–0.9)
GFR AFRICAN AMERICAN: 15 ML/MIN
GFR NON-AFRICAN AMERICAN: 12 ML/MIN
GFR SERPL CREATININE-BSD FRML MDRD: ABNORMAL ML/MIN/{1.73_M2}
GFR SERPL CREATININE-BSD FRML MDRD: ABNORMAL ML/MIN/{1.73_M2}
GLUCOSE BLD-MCNC: 129 MG/DL (ref 65–105)
GLUCOSE BLD-MCNC: 136 MG/DL (ref 65–105)
GLUCOSE BLD-MCNC: 144 MG/DL (ref 70–99)
GLUCOSE BLD-MCNC: 193 MG/DL (ref 65–105)
GLUCOSE BLD-MCNC: 278 MG/DL (ref 65–105)
HBV SURFACE AB TITR SER: 48.51 MIU/ML
HEPATITIS B CORE TOTAL ANTIBODY: NONREACTIVE
HEPATITIS B SURFACE ANTIGEN: NONREACTIVE
POTASSIUM SERPL-SCNC: 4.3 MMOL/L (ref 3.7–5.3)
SODIUM BLD-SCNC: 139 MMOL/L (ref 135–144)

## 2021-08-13 PROCEDURE — 97130 THER IVNTJ EA ADDL 15 MIN: CPT

## 2021-08-13 PROCEDURE — 6370000000 HC RX 637 (ALT 250 FOR IP): Performed by: PHYSICAL MEDICINE & REHABILITATION

## 2021-08-13 PROCEDURE — 86317 IMMUNOASSAY INFECTIOUS AGENT: CPT

## 2021-08-13 PROCEDURE — 97110 THERAPEUTIC EXERCISES: CPT

## 2021-08-13 PROCEDURE — 86704 HEP B CORE ANTIBODY TOTAL: CPT

## 2021-08-13 PROCEDURE — 5A1D70Z PERFORMANCE OF URINARY FILTRATION, INTERMITTENT, LESS THAN 6 HOURS PER DAY: ICD-10-PCS | Performed by: INTERNAL MEDICINE

## 2021-08-13 PROCEDURE — 97530 THERAPEUTIC ACTIVITIES: CPT

## 2021-08-13 PROCEDURE — 97116 GAIT TRAINING THERAPY: CPT

## 2021-08-13 PROCEDURE — 2500000003 HC RX 250 WO HCPCS: Performed by: INTERNAL MEDICINE

## 2021-08-13 PROCEDURE — 97129 THER IVNTJ 1ST 15 MIN: CPT

## 2021-08-13 PROCEDURE — 90945 DIALYSIS ONE EVALUATION: CPT

## 2021-08-13 PROCEDURE — 90935 HEMODIALYSIS ONE EVALUATION: CPT

## 2021-08-13 PROCEDURE — 1200000000 HC SEMI PRIVATE

## 2021-08-13 PROCEDURE — 36415 COLL VENOUS BLD VENIPUNCTURE: CPT

## 2021-08-13 PROCEDURE — 80048 BASIC METABOLIC PNL TOTAL CA: CPT

## 2021-08-13 PROCEDURE — 51798 US URINE CAPACITY MEASURE: CPT

## 2021-08-13 PROCEDURE — 97535 SELF CARE MNGMENT TRAINING: CPT

## 2021-08-13 PROCEDURE — 99232 SBSQ HOSP IP/OBS MODERATE 35: CPT | Performed by: INTERNAL MEDICINE

## 2021-08-13 PROCEDURE — 99232 SBSQ HOSP IP/OBS MODERATE 35: CPT | Performed by: PHYSICAL MEDICINE & REHABILITATION

## 2021-08-13 PROCEDURE — 1180000000 HC REHAB R&B

## 2021-08-13 PROCEDURE — 87340 HEPATITIS B SURFACE AG IA: CPT

## 2021-08-13 PROCEDURE — 82947 ASSAY GLUCOSE BLOOD QUANT: CPT

## 2021-08-13 RX ORDER — SODIUM CITRATE 4 % (5 ML)
1.3 SYRINGE (ML) MISCELLANEOUS PRN
Status: DISCONTINUED | OUTPATIENT
Start: 2021-08-13 | End: 2021-08-25 | Stop reason: CLARIF

## 2021-08-13 RX ADMIN — BUSPIRONE HYDROCHLORIDE 7.5 MG: 7.5 TABLET ORAL at 21:12

## 2021-08-13 RX ADMIN — CARVEDILOL 6.25 MG: 6.25 TABLET, FILM COATED ORAL at 09:24

## 2021-08-13 RX ADMIN — PANTOPRAZOLE SODIUM 40 MG: 40 TABLET, DELAYED RELEASE ORAL at 06:27

## 2021-08-13 RX ADMIN — CARVEDILOL 6.25 MG: 6.25 TABLET, FILM COATED ORAL at 18:22

## 2021-08-13 RX ADMIN — FUROSEMIDE 80 MG: 40 TABLET ORAL at 18:22

## 2021-08-13 RX ADMIN — INSULIN GLARGINE 48 UNITS: 100 INJECTION, SOLUTION SUBCUTANEOUS at 21:13

## 2021-08-13 RX ADMIN — TAMSULOSIN HYDROCHLORIDE 0.4 MG: 0.4 CAPSULE ORAL at 09:25

## 2021-08-13 RX ADMIN — APIXABAN 5 MG: 5 TABLET, FILM COATED ORAL at 21:12

## 2021-08-13 RX ADMIN — BUSPIRONE HYDROCHLORIDE 7.5 MG: 7.5 TABLET ORAL at 09:34

## 2021-08-13 RX ADMIN — APIXABAN 5 MG: 5 TABLET, FILM COATED ORAL at 09:22

## 2021-08-13 RX ADMIN — ACETAMINOPHEN 650 MG: 325 TABLET, FILM COATED ORAL at 19:20

## 2021-08-13 RX ADMIN — FERROUS SULFATE TAB 325 MG (65 MG ELEMENTAL FE) 325 MG: 325 (65 FE) TAB at 09:24

## 2021-08-13 RX ADMIN — ASPIRIN 81 MG: 81 TABLET, CHEWABLE ORAL at 09:22

## 2021-08-13 RX ADMIN — FEBUXOSTAT 40 MG: 40 TABLET, FILM COATED ORAL at 09:23

## 2021-08-13 RX ADMIN — BUSPIRONE HYDROCHLORIDE 7.5 MG: 7.5 TABLET ORAL at 12:28

## 2021-08-13 RX ADMIN — RANOLAZINE 1000 MG: 1000 TABLET, FILM COATED, EXTENDED RELEASE ORAL at 09:22

## 2021-08-13 RX ADMIN — GABAPENTIN 300 MG: 300 CAPSULE ORAL at 21:12

## 2021-08-13 RX ADMIN — INSULIN LISPRO 6 UNITS: 100 INJECTION, SOLUTION INTRAVENOUS; SUBCUTANEOUS at 11:04

## 2021-08-13 RX ADMIN — FERROUS SULFATE TAB 325 MG (65 MG ELEMENTAL FE) 325 MG: 325 (65 FE) TAB at 18:22

## 2021-08-13 RX ADMIN — FUROSEMIDE 80 MG: 40 TABLET ORAL at 09:25

## 2021-08-13 RX ADMIN — ATORVASTATIN CALCIUM 80 MG: 80 TABLET, FILM COATED ORAL at 21:12

## 2021-08-13 RX ADMIN — RANOLAZINE 1000 MG: 1000 TABLET, FILM COATED, EXTENDED RELEASE ORAL at 21:12

## 2021-08-13 RX ADMIN — Medication 1.3 ML: at 18:03

## 2021-08-13 RX ADMIN — GABAPENTIN 300 MG: 300 CAPSULE ORAL at 09:24

## 2021-08-13 RX ADMIN — ACETAMINOPHEN 650 MG: 325 TABLET, FILM COATED ORAL at 23:34

## 2021-08-13 RX ADMIN — INSULIN LISPRO 2 UNITS: 100 INJECTION, SOLUTION INTRAVENOUS; SUBCUTANEOUS at 07:41

## 2021-08-13 RX ADMIN — INSULIN LISPRO 1 UNITS: 100 INJECTION, SOLUTION INTRAVENOUS; SUBCUTANEOUS at 21:12

## 2021-08-13 RX ADMIN — Medication 1.3 ML: at 18:02

## 2021-08-13 RX ADMIN — TRAZODONE HYDROCHLORIDE 100 MG: 100 TABLET ORAL at 21:12

## 2021-08-13 RX ADMIN — INSULIN GLARGINE 48 UNITS: 100 INJECTION, SOLUTION SUBCUTANEOUS at 09:27

## 2021-08-13 RX ADMIN — POLYETHYLENE GLYCOL 3350 17 G: 17 POWDER, FOR SOLUTION ORAL at 09:27

## 2021-08-13 ASSESSMENT — PAIN SCALES - GENERAL
PAINLEVEL_OUTOF10: 6
PAINLEVEL_OUTOF10: 3
PAINLEVEL_OUTOF10: 0
PAINLEVEL_OUTOF10: 0
PAINLEVEL_OUTOF10: 8
PAINLEVEL_OUTOF10: 0
PAINLEVEL_OUTOF10: 0

## 2021-08-13 ASSESSMENT — PAIN DESCRIPTION - LOCATION
LOCATION: BACK;NECK
LOCATION: LEG

## 2021-08-13 ASSESSMENT — PAIN DESCRIPTION - PAIN TYPE: TYPE: CHRONIC PAIN

## 2021-08-13 NOTE — CONSULTS
[facility]    CONSULTATION / HISTORY AND PHYSICAL EXAMINATION            Date:   8/13/2021  Patient name:  Freeman Montes De Oca  Date of admission:  8/11/2021  5:47 PM  MRN:   [id number]  Account:  [de-identified]  YOB: 1960  PCP:    AFSANEH Henry CNP  Room:   [address] [place] [address]  Code Status:    Full Code    Physician Requesting Consult: Eneida Chiu MD    Reason for Consult:  medical management    Chief Complaint:     No chief complaint on file.     Right upper and lower extremity weakness  History Obtained From:     Patient medical record nursing staff    History of Present Illness:   69-year-old  lady morbidly obese class III BMI 40.74 initially admitted to [facility] with right arm weakness and altered mental status she had an MRI done which showed acute lacunar infarct of the left frontal lobe medically managed history of chronic kidney disease had acute kidney injury required hemodialysis with right-sided Mauriico catheter in place in the jugular vein with improving renal function hemodialysis on hold    Noted to have some epistaxis this morning refusing to have anterior nasal packing done patient on anticoagulants  Multiple comorbid condition including hypertension hyperlipidemia coronary disease congestive heart failure diabetes mellitus  Past Medical History:     Past Medical History:   Diagnosis Date    Backache, unspecified     CHF (congestive heart failure) (HCC)     Chronic kidney disease     Coronary atherosclerosis of artery bypass graft     Cramp of limb     Gallstones     Hypertension     Insomnia     Pneumonia     Type II or unspecified type diabetes mellitus with renal manifestations, not stated as uncontrolled(250.40)     Type II or unspecified type diabetes mellitus without mention of complication, not stated as uncontrolled     Unspecified vitamin D deficiency         Past Surgical History:     Past Surgical History:   Procedure Laterality Date    ANKLE FRACTURE SURGERY      BREAST SURGERY      CARPAL TUNNEL RELEASE      CHOLECYSTECTOMY, OPEN N/A     CORONARY ARTERY BYPASS GRAFT      x3    HAND SURGERY      KNEE ARTHROSCOPY      right    TONSILLECTOMY          Medications Prior to Admission:     Prior to Admission medications    Medication Sig Start Date End Date Taking? Authorizing Provider   busPIRone (BUSPAR) 7.5 MG tablet TAKE 1 TABLET BY MOUTH THREE TIMES DAILY 7/1/21   Historical Provider, MD   gabapentin (NEURONTIN) 300 MG capsule TAKE 1 CAPSULE BY MOUTH TWICE DAILY 6/21/21 7/23/21  Maricruz Minium, APRN - CNP   ELIQUIS 5 MG TABS tablet  6/5/21   Historical Provider, MD   blood glucose test strips (DEN CONTOUR TEST) strip 1 each by In Vitro route 3 times daily As needed. 4/30/21   Maricruz Minium, APRN - CNP   Dulaglutide (TRULICITY) 1.5 OB/4.8FY SOPN 1.5 mg    Historical Provider, MD   insulin glargine (BASAGLAR KWIKPEN) 100 UNIT/ML injection pen Basaglar KwikPen U-100 Insulin 100 unit/mL (3 mL) subcutaneous   Inject 43 units twice a day by subcutaneous route as directed for 90 days.     Historical Provider, MD   insulin lispro, 1 Unit Dial, (HUMALOG KWIKPEN) 100 UNIT/ML SOPN Humalog KwikPen (U-100) Insulin 100 unit/mL subcutaneous   15 units with each meal plus 2-10 units as SS if BS>150    Historical Provider, MD   Biotin w/ Vitamins C & E (HAIR/SKIN/NAILS PO) Take 200 mg by mouth daily    Historical Provider, MD   allopurinol (ZYLOPRIM) 100 MG tablet Take 100 mg by mouth daily    Historical Provider, MD   sharps container 1 each by Does not apply route as needed (dirty pen needles) 4/19/21   Maricruz Minium, APRN - CNP   ferrous sulfate (BRIAN-ARCHIE) 325 (65 Fe) MG tablet Take 1 tablet by mouth daily 4/19/21   Maricruz Minium, APRN - CNP   allopurinol (ZYLOPRIM) 100 MG tablet Take 1 tablet by mouth daily 4/14/21   Maricruz Minium, APRN - CNP   isosorbide mononitrate (IMDUR) 30 MG extended release tablet Take 1 tablet by mouth daily 4/9/21   Jeffery Barajas MD   traZODone (DESYREL) 50 MG tablet Take 75 mg by mouth nightly    Historical Provider, MD   bumetanide (BUMEX) 2 MG tablet Take 1 tablet by mouth daily 3/30/21   AFSANEH Morgan CNP   atorvastatin (LIPITOR) 40 MG tablet Take 1 tablet by mouth daily 3/30/21   AFSANEH Morgan CNP   carvedilol (COREG) 6.25 MG tablet Take 1 tablet by mouth 2 times daily 3/30/21   AFSANEH Morgan CNP   ranolazine (RANEXA) 500 MG extended release tablet Take 1 tablet by mouth 2 times daily  Patient taking differently: Take 1,000 mg by mouth 2 times daily  3/30/21   AFSANEH Morgan CNP   nitroGLYCERIN (NITROSTAT) 0.4 MG SL tablet Place 1 tablet under the tongue every 5 minutes as needed for Chest pain. 3/29/15   AFSANEH eLe CNP   Insulin Pen Needle (BD ULTRA-FINE PEN NEEDLES) 29G X 12.7MM MISC 1 each by Does not apply route 6 times daily. For use with each insulin 12/23/14   Kendal Allen MD   docusate sodium (COLACE) 100 MG capsule Take 100 mg by mouth 2 times daily. Historical Provider, MD   Lancets 28G MISC Inject 1 each into the skin 3 times daily. 10/30/14   Kendal Allen MD        Allergies:     Adhesive tape, Ace inhibitors, Iv dye [iodides], and Metformin and related    Social History:     Tobacco:    reports that she has never smoked. She has never used smokeless tobacco.  Alcohol:      reports no history of alcohol use. Drug Use:  reports no history of drug use. Family History:     Family History   Problem Relation Age of Onset    Diabetes Father     Heart Failure Father        Review of Systems:     Positive and Negative as described in HPI. CONSTITUTIONAL:  negative for fevers, chills, sweats, fatigue, weight loss  HEENT:  negative for vision, hearing changes, runny nose, throat pain  Positive for epistaxis right nostril  RESPIRATORY:  negative for shortness of breath, cough, congestion, wheezing.   CARDIOVASCULAR: negative for chest pain, palpitations. GASTROINTESTINAL:  negative for nausea, vomiting, diarrhea, constipation, change in bowel habits, abdominal pain   GENITOURINARY:  negative for difficulty of urination, burning with urination, frequency   INTEGUMENT:  negative for rash, skin lesions, easy bruising   HEMATOLOGIC/LYMPHATIC:  negative for swelling/edema   ALLERGIC/IMMUNOLOGIC:  negative for urticaria , itching  ENDOCRINE:  negative increase in drinking, increase in urination, hot or cold intolerance  MUSCULOSKELETAL:  negative joint pains, muscle aches, swelling of joints  NEUROLOGICAL: Positive for right upper extremity and right lower extremity mild weakness  BEHAVIOR/PSYCH: Denies depression    Physical Exam:     /62   Pulse 90   Temp 97.8 °F (36.6 °C) (Oral)   Resp 16   Ht 5' 5\" (1.651 m)   Wt 244 lb 12.8 oz (111 kg)   SpO2 92%   BMI 40.74 kg/m²   Temp (24hrs), Av.7 °F (36.5 °C), Min:97.5 °F (36.4 °C), Max:97.8 °F (36.6 °C)    Recent Labs     21  1603 21  2016 21  0734 21  1047   POCGLU 271* 290* 136* 278*       Intake/Output Summary (Last 24 hours) at 2021 1412  Last data filed at 2021 0230  Gross per 24 hour   Intake --   Output 200 ml   Net -200 ml     Dialysis catheter noted in the jugular vein right side of the neck  General Appearance:  alert, well appearing, and in no acute distress  Mental status: oriented to person, place, and time with normal affect  Head:  normocephalic, atraumatic.   Eye: no icterus, redness, pupils equal and reactive, extraocular eye movements intact, conjunctiva clear  Ear: normal external ear, no discharge, hearing intact  Nose:  no drainage noted  Small amount of epistaxis noted    Mouth: mucous membranes moist  Neck: supple, no carotid bruits, thyroid not palpable  Lungs: Bilateral equal air entry, clear to ausculation, no wheezing, rales or rhonchi, normal effort  Cardiovascular: normal rate, regular rhythm, no murmur, cerebrovascular accident (CVA) ([facility] [address] [I63.9] 08/11/2021       Plan:     57-year-old lady morbidly obese class III BMI 40.74 history of chronic kidney disease hypertension coronary disease chronic diastolic congestive heart failure  New diagnosis of acute lacunar infarct with right upper and lower extremity weakness  Acute on chronic kidney disease required hemodialysis with a Mauricio catheter  Diabetes mellitus with neuropathy Lantus sliding scale  Gabapentin continue for diabetic neuropathy  Anemia of chronic kidney disease received IV iron  Coronary artery disease continue carvedilol and Ranexa  Chronic diastolic congestive heart failure compensated continue Lasix  Plan for hd today  Epistaxis stopped        Shelbi Shipley MD  8/13/2021  2:12 PM    Copy sent to Dr. Mackenzie Castañeda, APRN - CNP    Please note that this chart was generated using voice recognition Dragon dictation software. Although every effort was made to ensure the accuracy of this automated transcription, some errors in transcription may have occurred.

## 2021-08-13 NOTE — PROGRESS NOTES
Physical Medicine & Rehabilitation  Progress Note      Subjective:      59year-old female with ischemic CVA. Patient is well, and has had no acute complaints or problems. She denies any new issues with pain, appetite or bowel. ROS:  Denies fevers, chills, sweats. No chest pain, palpitations, lightheadedness. Denies coughing, wheezing or shortness of breath. Denies abdominal pain, nausea, diarrhea or constipation. No new areas of joint pain. Denies new areas of numbness or weakness. Denies new anxiety or depression issues. No new skin problems. Rehabilitation:   Progressing in therapies. PT:  Restrictions/Precautions: Fall Risk  Required Braces or Orthoses  Right Lower Extremity Brace:  (Lymphedema brace )  Left Lower Extremity Brace:  (Lymphedema brace )   Transfers  Sit to Stand: Contact guard assistance  Stand to sit: Contact guard assistance  Bed to Chair: Contact guard assistance  Stand Pivot Transfers: Contact guard assistance  Lateral Transfers: Contact guard assistance  Comment: Transfers assessed with rolling walker. Pt requires min cues for hand placement. Steady, no LOB noted. Ambulation 1  Surface: level tile  Device: Rolling Walker  Assistance: Contact guard assistance  Quality of Gait: Slightly unsteady, no LOB noted. Decrease step length, increase LLE shakiness. Distance: 20'x2  Comments: Lymphedema braces donned prior to gait. Pt. ambulated from bed to hallway with w/c follow. Pt. asked to sit down due to back pain that increased with mobility. Transfers  Sit to Stand: Contact guard assistance  Stand to sit: Contact guard assistance  Bed to Chair: Contact guard assistance  Stand Pivot Transfers: Contact guard assistance  Lateral Transfers: Contact guard assistance  Comment: Transfers assessed with rolling walker. Pt requires min cues for hand placement. Steady, no LOB noted.    Ambulation  Ambulation?: Yes  Ambulation 1  Surface: level tile  Device: Rolling Walker  Assistance: Contact guard assistance  Quality of Gait: Slightly unsteady, no LOB noted. Decrease step length, increase LLE shakiness. Distance: 20'x2  Comments: Lymphedema braces donned prior to gait. Pt. ambulated from bed to hallway with w/c follow. Pt. asked to sit down due to back pain that increased with mobility. Surface: level tile  Ambulation 1  Surface: level tile  Device: Rolling Walker  Assistance: Contact guard assistance  Quality of Gait: Slightly unsteady, no LOB noted. Decrease step length, increase LLE shakiness. Distance: 20'x2  Comments: Lymphedema braces donned prior to gait. Pt. ambulated from bed to hallway with w/c follow. Pt. asked to sit down due to back pain that increased with mobility. OT:  ADL  Feeding: Modified independent  (per pt report)  Grooming: Stand by assistance  UE Bathing: Stand by assistance  LE Bathing: Minimal assistance (Min A fot bottom while standing)  UE Dressing: Stand by assistance  LE Dressing: Moderate assistance (A with lymphedema wraps and socks)  Toileting: Minimal assistance  Additional Comments: OT facilitated pts engagement in showering on this date. OK per RN Fleetwood Core. Dialysis port covered during shower. Pt utilized grab bars, tub bench, and hand held shower head. Pt demonstrated fear of falling during shower requesting OT A with bottom hygiene while standing. Pt required assistance with lymphedema wraps and socks at this time due to increased dizziness with bending fowards. Pt currently limited due to decreased strength, activity tolerance, balance, and body habitus impacting safety and independence with self care tasks.           Balance  Sitting Balance: Stand by assistance  Standing Balance: Contact guard assistance   Standing Balance  Time: 1-2 minutes x 4  Activity: Functional transfers, functional mobility, lower body dressing, lower body bathing  Comment: with RW  Functional Mobility  Functional - Mobility Device: Rolling Walker  Activity: To/from bathroom  Assist Level: Contact guard assistance  Functional Mobility Comments: Verbal cues for hand placement and safety     Bed mobility  Bridging: Stand by assistance  Rolling to Left: Stand by assistance  Rolling to Right: Stand by assistance  Supine to Sit: Stand by assistance  Scooting: Stand by assistance  Comment: Pt sitting in wheelchair at the start and end of session  Transfers  Sit to stand: Contact guard assistance  Stand to sit: Contact guard assistance  Transfer Comments: Verbal cues for hand placement and safety   Toilet Transfers  Toilet - Technique: Ambulating  Equipment Used: Raised toilet seat with rails  Toilet Transfer: Contact guard assistance  Toilet Transfers Comments: Increased time with verbal cues for hand placement and safety     Shower Transfers  Shower - Transfer To: Transfer tub bench  Shower - Technique: Ambulating  Shower Transfers: Contact Guard  Shower Transfers Comments: Increased time with verbal cues for safety over threshold. SPEECH:  Subjective: [x]? Alert     [x]? Cooperative     []? Confused     []? Agitated    []? Lethargic        Objective/Assessment:  Attention: Functional throughout     Recall: paragraph recall- 67%, 74% c cues. ST educ. Pt. Re: compensatory recall strategies.      Problem Solving/Reasoning: Project planning (deductive reasoning) puzzle- min to mod A x2.      Other:     Objective:  /62   Pulse 90   Temp 97.8 °F (36.6 °C) (Oral)   Resp 16   Ht 5' 5\" (1.651 m)   Wt 244 lb 12.8 oz (111 kg)   SpO2 92%   BMI 40.74 kg/m²       GEN: Well developed, well nourished, in NAD  HEENT:  NCAT. PERRL. EOMI. Mucous membranes pink and moist. R IJ triple lumen in place. PULM:  Clear to ausculation. No rales or rhonchi. Respirations WNL and unlabored. CV:  Regular rate rhythm. No murmurs or gallops. GI:  Abdomen soft. Nontender. Non-distended. BS + and equal.    NEUROLOGICAL: A&O x3. Sensation intact to light touch.     MSK:  Functional ROM all extremities. Motor testing 4+/5 key muscles LUE and LLE. 4/5 key muscles RUE and RLE. Karine Strong SKIN: Warm dry and intact. Good turgor. Scattered ecchymosis BUEs. EXTREMITIES:  No calf tenderness to palpation. Chronic stable BLE lymphedema. PSYCH: Mood WNL. Appropriately interactive. Affect WNL. Diagnostics:     CBC: Recent Labs     08/12/21  0619   WBC 8.6   RBC 3.28*   HGB 10.6*   HCT 31.8*   MCV 96.9   RDW 17.0*        BMP:   Recent Labs     08/12/21  0619 08/13/21  0712    139   K 3.9 4.3    100   CO2 25 27   BUN 57* 65*   CREATININE 2.98* 3.71*   GLUCOSE 215* 144*     BNP: No results for input(s): BNP in the last 72 hours. PT/INR: No results for input(s): PROTIME, INR in the last 72 hours. APTT: No results for input(s): APTT in the last 72 hours. CARDIAC ENZYMES: No results for input(s): CKMB, CKMBINDEX, TROPONINT in the last 72 hours. Invalid input(s): CKTOTAL;3  FASTING LIPID PANEL:  Lab Results   Component Value Date    CHOL 105 04/09/2015    HDL 43 04/09/2015    TRIG 168 04/09/2015     LIVER PROFILE: No results for input(s): AST, ALT, ALB, BILIDIR, BILITOT, ALKPHOS in the last 72 hours.      Current Medications:   Current Facility-Administered Medications: traZODone (DESYREL) tablet 100 mg, 100 mg, Oral, Nightly  sodium chloride (OCEAN, BABY AYR) 0.65 % nasal spray 1 spray, 1 spray, Each Nostril, PRN  acetaminophen (TYLENOL) tablet 650 mg, 650 mg, Oral, Q4H PRN  polyethylene glycol (GLYCOLAX) packet 17 g, 17 g, Oral, Daily  senna (SENOKOT) tablet 17.2 mg, 2 tablet, Oral, Daily PRN  bisacodyl (DULCOLAX) suppository 10 mg, 10 mg, Rectal, Daily PRN  febuxostat (ULORIC) tablet 40 mg, 40 mg, Oral, Daily  tamsulosin (FLOMAX) capsule 0.4 mg, 0.4 mg, Oral, Daily  gabapentin (NEURONTIN) capsule 300 mg, 300 mg, Oral, BID  furosemide (LASIX) tablet 80 mg, 80 mg, Oral, BID  pantoprazole (PROTONIX) tablet 40 mg, 40 mg, Oral, QAM AC  ranolazine (RANEXA) extended release tablet 1,000 mg, 1,000 mg, Oral, BID  insulin lispro (HUMALOG) injection vial 0-12 Units, 0-12 Units, Subcutaneous, TID WC  insulin lispro (HUMALOG) injection vial 0-6 Units, 0-6 Units, Subcutaneous, Nightly  glucose (GLUTOSE) 40 % oral gel 15 g, 15 g, Oral, PRN  dextrose 50 % IV solution, 12.5 g, Intravenous, PRN  glucagon (rDNA) injection 1 mg, 1 mg, Intramuscular, PRN  dextrose 5 % solution, 100 mL/hr, Intravenous, PRN  apixaban (ELIQUIS) tablet 5 mg, 5 mg, Oral, BID  aspirin chewable tablet 81 mg, 81 mg, Oral, Daily  atorvastatin (LIPITOR) tablet 80 mg, 80 mg, Oral, Nightly  busPIRone (BUSPAR) tablet 7.5 mg, 7.5 mg, Oral, TID  carvedilol (COREG) tablet 6.25 mg, 6.25 mg, Oral, BID WC  ferrous sulfate (IRON 325) tablet 325 mg, 325 mg, Oral, BID WC  insulin glargine (LANTUS) injection vial 48 Units, 48 Units, Subcutaneous, BID      Impression/Plan:   Impaired ADLs, gait, and mobility due to:      1. Ischemic CVA L frontal lobe with mild R dominant hemiplegia:  PT/OT for gait, mobility, strengthening, endurance, ADLs, and self care. On ASA, atorvastatin  2. HTN/CAD/Angina/Diastolic CHF: on carvedilol, furosemide, Ranexa  3. Insomnia: on Trazodone - was taking 75 mg at home - increased to 100 mg. Sleep improved. 4. Anxiety: on buspirone  5. DM with neuropathy: on lantus, sliding scale, gabapentin  6. AURELIA on CKD IV: Had HD at [facility]. Nephrology following - continuing lasix at 80 mg BID. Planning for dialysis today. If patient remains HD dependent nephrology planning tunneled HD catheter next week. 7. Anemia of chronic disease: on ferrous sulfate. Had IV iron at [facility]. 8. Urine retention: on flomax  9. Bowel Management: Miralax daily, senokot prn, dulcolax prn. Noting some constipation secondary to iron. 10. DVT Prophylaxis:  SCD's while in bed, AGUSTIN's and Eliquis  11.  Internal Medicine for medical management    Electronically signed by Breana Dumont MD on 8/13/2021 at 10:07 AM      This note is created with the assistance of a speech recognition program.  While intending to generate a document that actually reflects the content of the visit, the document can still have some errors including those of syntax and sound a like substitutions which may escape proof reading. In such instances, actual meaning can be extrapolated by contextual diversion.

## 2021-08-13 NOTE — PROGRESS NOTES
NEPHROLOGY CONSULT     Patient :  Freeman Montes De Oca; 59 y.o. MRN# [id number]  Location:  2617/2617-01  Attending:  Eneida Chiu MD  Admit Date:  8/11/2021   Hospital Day: 2      Reason for Consult: Acute kidney injury      Chief Complaint: Left-sided weakness, stroke  History Obtained From: Patient    History of Present Illness: This is a 59 y.o. female with past medical history of longstanding type 2 diabetes insulin-dependent diabetes mellitus, essential hypertension, coronary artery disease status post CABG in 2004, coronary artery stent in 2019, CHF with EF of 55%, history of mild to moderate LVH diastolic dysfunction, CKD 4 with baseline creatinine of about 2.2 to 2.4 mg/dL, patient initially presented to [facility] on 8/1/2021 with right-sided weakness, sudden onset patient was diagnosed with acute/subacute stroke in left frontal lobe with right-sided weakness, patient had CT angiogram during that. And developed acute kidney injury due to contrast-induced nephropathy requiring dialysis serum creatinine peaked to 4.9 mg/dL and patient was started on dialysis on 6 August patient had dialysis on 7 August, and dialysis was held follow kidney function renal recovery and subsequently patient was transferred to acute rehab on 10 August.  Nephrology is consulted for ongoing management of  acute kidney injury on CKD. Patient is feeling better weakness slightly improved patient still have peripheral edema patient is on diuretics most recent serum creatinine is 2.9 mg/dL, BUN 57  Urine output is not measured. During hospital stay at [facility] patient did have urinary retention and had Pantoja catheter which was removed 2 days ago. Denies any urinary complaints  Blood pressure has been stable    Medication review showed use of  ARB's and diuretics. Subjective/interval history.   Patient seen and examined, patient denies any new complaints continue to have peripheral edema  Urine output not recorded properly but remains low according to the patient  Serum creatinine increased from 2.9 mg/dL to 3.7 mg/dL patient had last dialysis treatment on 7 August.  We will plan for dialysis today        Past Medical History:        Diagnosis Date    Backache, unspecified     CHF (congestive heart failure) (HCC)     Chronic kidney disease     Coronary atherosclerosis of artery bypass graft     Cramp of limb     Gallstones     Hypertension     Insomnia     Pneumonia     Type II or unspecified type diabetes mellitus with renal manifestations, not stated as uncontrolled(250.40)     Type II or unspecified type diabetes mellitus without mention of complication, not stated as uncontrolled     Unspecified vitamin D deficiency        Past Surgical History:        Procedure Laterality Date    ANKLE FRACTURE SURGERY      BREAST SURGERY      CARPAL TUNNEL RELEASE      CHOLECYSTECTOMY, OPEN N/A     CORONARY ARTERY BYPASS GRAFT      x3    HAND SURGERY      KNEE ARTHROSCOPY      right    TONSILLECTOMY         Current Medications:    traZODone (DESYREL) tablet 100 mg, Nightly  sodium chloride (OCEAN, BABY AYR) 0.65 % nasal spray 1 spray, PRN  acetaminophen (TYLENOL) tablet 650 mg, Q4H PRN  polyethylene glycol (GLYCOLAX) packet 17 g, Daily  senna (SENOKOT) tablet 17.2 mg, Daily PRN  bisacodyl (DULCOLAX) suppository 10 mg, Daily PRN  febuxostat (ULORIC) tablet 40 mg, Daily  tamsulosin (FLOMAX) capsule 0.4 mg, Daily  gabapentin (NEURONTIN) capsule 300 mg, BID  furosemide (LASIX) tablet 80 mg, BID  pantoprazole (PROTONIX) tablet 40 mg, QAM AC  ranolazine (RANEXA) extended release tablet 1,000 mg, BID  insulin lispro (HUMALOG) injection vial 0-12 Units, TID WC  insulin lispro (HUMALOG) injection vial 0-6 Units, Nightly  glucose (GLUTOSE) 40 % oral gel 15 g, PRN  dextrose 50 % IV solution, PRN  glucagon (rDNA) injection 1 mg, PRN  dextrose 5 % solution, PRN  apixaban (ELIQUIS) tablet 5 mg, BID  aspirin chewable tablet 81 mg, Daily  atorvastatin (LIPITOR) tablet 80 mg, Nightly  busPIRone (BUSPAR) tablet 7.5 mg, TID  carvedilol (COREG) tablet 6.25 mg, BID WC  ferrous sulfate (IRON 325) tablet 325 mg, BID WC  insulin glargine (LANTUS) injection vial 48 Units, BID        Allergies:  Adhesive tape, Ace inhibitors, Iv dye [iodides], and Metformin and related        Objective:  CURRENT TEMPERATURE:  Temp: 97.8 °F (36.6 °C)  MAXIMUM TEMPERATURE OVER 24HRS:  Temp (24hrs), Av.7 °F (36.5 °C), Min:97.5 °F (36.4 °C), Max:97.8 °F (36.6 °C)    CURRENT RESPIRATORY RATE:  Resp: 16  CURRENT PULSE:  Pulse: 90  CURRENT BLOOD PRESSURE:  BP: 129/62  24HR BLOOD PRESSURE RANGE:  Systolic (24JBI), AKQ:110 , Min:105 , DINO:894   ; Diastolic (57CCR), FHF:34, Min:55, Max:62    24HR INTAKE/OUTPUT:      Intake/Output Summary (Last 24 hours) at 2021 1132  Last data filed at 2021 0230  Gross per 24 hour   Intake --   Output 200 ml   Net -200 ml     Patient Vitals for the past 96 hrs (Last 3 readings):   Weight   21 1754 244 lb 12.8 oz (111 kg)       Physical Exam:  GENERAL APPEARANCE: Alert and cooperative, and appears to be in no acute distress. HEAD: normocephalic  EYES: EOMI. Not pale, anicteric   NOSE:  No nasal discharge. THROAT:  Oral cavity and pharynx normal. Moist  CARDIAC: Normal S1 and S2. No S3, S4 or murmurs. Rhythm is regular. LUNGS: Clear to auscultation and percussion without rales, rhonchi, wheezing or diminished breath sounds. BACK: Examination of the spine reveals normal gait and posture, no spinal deformity, symmetry of spinal muscles, without tenderness, decreased range of motion or muscular spasm    MUSKULOSKELETAL: Adequately aligned spine. No joint erythema or tenderness. EXTREMITIES: 2+-3 edema. Peripheral pulses intact.    NEURO: Right-sided weakness      Labs:   CBC:  Recent Labs     21  0619   WBC 8.6   RBC 3.28*   HGB 10.6*   HCT 31.8*   MCV 96.9   MCH 32.3   MCHC 33.4   RDW 17.0*    MPV 8.4      BMP:   Recent Labs     08/12/21  0619 08/13/21  0712    139   K 3.9 4.3    100   CO2 25 27   BUN 57* 65*   CREATININE 2.98* 3.71*   GLUCOSE 215* 144*   CALCIUM 9.4 9.5      Phosphorus:  No results for input(s): PHOS in the last 72 hours. Magnesium: No results for input(s): MG in the last 72 hours. Albumin: No results for input(s): LABALBU in the last 72 hours. IRON:  No results for input(s): IRON in the last 72 hours. Iron Saturation:  Invalid input(s): PERCENTFE  TIBC:  No results for input(s): TIBC in the last 72 hours. FERRITIN:  No results for input(s): FERRITIN in the last 72 hours. SPEP: No results for input(s): SPEP in the last 72 hours. No results for input(s): PROT, ALBCAL, ALBCAL, ALBPCT, LABALPH, A1PCT, LABALPH, A2PCT, LABBETA, BETAPCT, GAMGLOB, GGPCT, PATH in the last 72 hours. UPEP: No results for input(s): TPU in the last 72 hours. Urine Sodium:  No results for input(s): JASON in the last 72 hours. Urine Potassium: No results for input(s): KUR in the last 72 hours. Urine Chloride: Invalid input(s): CLU  Urine Ph:  Invalid input(s): PO4U  Urine Osmolarity: No results for input(s): OSMOU in the last 72 hours. Urine Creatinine:  No results for input(s): LABCREA in the last 72 hours. Urine Eosinophils: Invalid input(s): EOSU  Urine Protein:  No results for input(s): TPU in the last 72 hours. Urinalysis:  No results for input(s): Domnick Wiliam, PHUR, LABCAST, WBCUA, RBCUA, MUCUS, TRICHOMONAS, YEAST, BACTERIA, CLARITYU, SPECGRAV, LEUKOCYTESUR, UROBILINOGEN, BILIRUBINUR, BLOODU, GLUCOSEU, KETUA, AMORPHOUS in the last 72 hours. Radiology:  Reviewed as available.     Assessment:  Patient was admitted to [facility] on 8/1/2021 with acute frontal stroke right-sided weakness, patient developed acute kidney injury due to contrast-induced nephropathy serum creatinine peaked to 4.9 mg/dL patient was started on dialysis patient had dialysis on August 6 and 7,

## 2021-08-13 NOTE — PROGRESS NOTES
HEMODIALYSIS PRE-TREATMENT NOTE    Patient Identifiers prior to treatment: Name, , MRN    Isolation Required:  Yes                      Isolation Type: Contact, MRSA       (please document if patient is being managed as a PUI/COVID-19 patient)        Hepatitis status:                           Date Drawn                             Result  Hepatitis B Surface Antigen 21 Negative        Hepatitis B Surface Antibody 21 48.51        Hepatitis B Core Antibody 21 Negative          How was Hepatitis Status verified: Epic, Immune  Was a copy of the labs you documented provided to facility for the patient's chart:     Hemodialysis orders verified: Yes    Access Within normal limits ( I.e. s/s of infection,...): CVC to right neck     Pre-Assessment completed: Yes    Pre-dialysis report received from: Sunshine Boles RN                      Time: 6701

## 2021-08-13 NOTE — PROGRESS NOTES
Physical Therapy  Facility/Department: [facility]  Daily Treatment Note  NAME: Daly Abraham  : 1960  MRN: [id number]    Date of Service: 2021    Discharge Recommendations:  Patient would benefit from continued therapy after discharge, Home with assist PRN   PT Equipment Recommendations  Equipment Needed: No    Assessment   Body structures, Functions, Activity limitations: Decreased functional mobility ; Decreased strength;Decreased endurance;Decreased balance; Increased pain  Treatment Diagnosis: CVA  Specific instructions for Next Treatment: Balance training, transfer training, gait training  REQUIRES PT FOLLOW UP: Yes  Activity Tolerance  Activity Tolerance: Patient limited by pain; Patient limited by endurance     Patient Diagnosis(es): There were no encounter diagnoses. has a past medical history of Backache, unspecified, CHF (congestive heart failure) ([facility] [address] Chronic kidney disease, Coronary atherosclerosis of artery bypass graft, Cramp of limb, Gallstones, Hypertension, Insomnia, Pneumonia, Type II or unspecified type diabetes mellitus with renal manifestations, not stated as uncontrolled(250.40), Type II or unspecified type diabetes mellitus without mention of complication, not stated as uncontrolled, and Unspecified vitamin D deficiency. has a past surgical history that includes Coronary artery bypass graft; Knee arthroscopy; Carpal tunnel release; Breast surgery; Tonsillectomy; Hand surgery; Ankle fracture surgery; and Cholecystectomy, open (N/A). Restrictions  Restrictions/Precautions  Restrictions/Precautions: Fall Risk  Required Braces or Orthoses?: Yes  Required Braces or Orthoses  Right Lower Extremity Brace:  (Lymphedema brace )  Left Lower Extremity Brace:  (Lymphedema brace )  Subjective   General  Chart Reviewed: Yes  Additional Pertinent Hx: Presented with acute right arm weakness and mental status change.   MRI brain showed probable acute to subacute lacunar infarct of the left frontal lobe. AURELIA on CKD stage 4 likely d/t contrast induced nephropathy. Dialysis was initiated on 8/6/21, had 2 treatment session, labs improving. Pt. has history of diabetes mellitus type II, CAD s/p CABG (2005), cervical stenosis, back pain, lymphedema, diastolic CHF, DVT, urinary retention, and iron deficiency anemia. Pt. admitted to ARU from [place] [address] 8/11/21. Response To Previous Treatment: Patient with no complaints from previous session. Family / Caregiver Present: No  Referring Practitioner: Dr. Renetta Mak: Patient staing she slept well throughout the night. Motivated and eager to work with therapy   Pain Screening  Patient Currently in Pain: Denies  Vital Signs  Patient Currently in Pain: Denies       Orientation  Orientation  Overall Orientation Status: Within Functional Limits  Objective      Transfers  Sit to Stand: Stand by assistance  Stand to sit: Stand by assistance  Bed to Chair: Stand by assistance  Stand Pivot Transfers: Stand by assistance  Comment: Transfers assessed with rolling walker. Steady, no LOB noted. Ambulation  Ambulation?: Yes  Ambulation 1  Surface: level tile  Device: Rolling Walker  Assistance: Contact guard assistance  Quality of Gait: Slightly unsteady, no LOB noted. Decrease step length, increase LLE shakiness. Gait Deviations: Slow Annie;Decreased step height;Decreased step length  Distance: 148ft AM; 165ft, 47ft PM   Comments: toilet transfer in between distances in PM   Stairs/Curb  Stairs?: Yes  Stairs  # Steps : 10  Stairs Height:  (4\"/6\")  Rails: Bilateral  Device: No Device  Assistance: Contact guard assistance  Comment: PT demos a step to gait pattern.          Other exercises  Other exercises?: Yes  Other exercises 1: seated B LE exercises x20 reps #2 and green tband   Other exercises 2: seated UBE 5 minutes FWD/BWD   Other exercises 3: standing B LE exercises in // bars with #2; 3 way hip with seated rest breaks PRN   Other exercises 4: seated balloon tap ~3 minutes d/t increased back pain with standing    Other exercises 5: toilet transfer in PM   Other exercises 6: NuStep L3 x10 minutes; seat @8          Comment: rest breaks PRN. Goals  Short term goals  Time Frame for Short term goals: 5 days   Short term goal 1: Pt. to perform bed mobility independently. Short term goal 2: Pt. to tolerate 30 to 45 minutes of therapeutic exercise to improve strength and endurance for increased functional mobility. Short term goal 3: Pt. to improve seated static and dynamic balance to good. Short term goal 4: Pt. to improve standing static balance to fair+ and standing dynamic balance to fair. Short term goal 5: Pt. to ambulate 200ft. with supervision using a rollator. Short term goal 6: Pt. to ascend/descend 3 steps with CGA using one railing  Long term goals  Time Frame for Long term goals : By DC  Long term goal 1: Pt. to perform all transfers independently or with Mod-I. Long term goal 2: Pt. to improve standing static and dynamic balance to good. Long term goal 3: Pt. to ambulate 100ft. on an unlevel/ inclined surface with Mod-I using a rollator. Long term goal 4: Pt. to ascend/descend one flight of stairs with supervision   Long term goal 5: Pt. to ambulate 200ft. in 2 minutes with Mod-I using a rollator. Long term goal 6: Pt. to improve PASS score from 24/36 to 35/36. Patient Goals   Patient goals : Improve balance and strength to be able to walk independently with rollator.      Plan    Plan  Times per week: 1.5hrs/day  Times per day: Daily  Specific instructions for Next Treatment: Balance training, transfer training, gait training  Current Treatment Recommendations: Strengthening, Balance Training, Transfer Training, Functional Mobility Training, ROM, Endurance Training, Gait Training, Stair training, Pain Management, Home Exercise Program, Safety Education & Training, Neuromuscular Re-education, Patient/Caregiver Education & Training, Equipment Evaluation, Education, & procurement  Safety Devices  Type of devices:  All fall risk precautions in place, Call light within reach, Gait belt, Patient at risk for falls, Left in chair, Nurse notified        08/13/21 0936 08/13/21 1328   PT Individual Minutes   Time In 0830 1300   Time Out 35 23 07   Minutes 61 39     Electronically signed by Anastasia Flores PTA on 8/13/21 at 2:11 PM EDT

## 2021-08-13 NOTE — PROGRESS NOTES
[facility]    ACUTE REHABILITATION OCCUPATIONAL THERAPY  DAILY NOTE    Date: 21  Patient Name: Ag Maria      Room: 2813/5582-12    MRN: [id number]   : 1960  (59 y.o.)  Gender: female   Referring Practitioner: Juliana Hampton MD  Diagnosis: CVA  Additional Pertinent Hx: Presented with acute right arm weakness and mental status change. MRI brain showed probable acute to subacute lacunar infarct of the left frontal lobe. AURELIA on CKD stage 4 likely d/t contrast induced nephropathy. Dialysis was initiated on 21, had 2 treatment session, labs improving. Pt. has history of diabetes mellitus type II, CAD s/p CABG (), cervical stenosis, back pain, lymphedema, diastolic CHF, DVT, urinary retention, and iron deficiency anemia. Pt. admitted to ARU from [place] [address] 21. Restrictions  Restrictions/Precautions: Fall Risk  Required Braces or Orthoses  Right Lower Extremity Brace:  (Lymphedema brace )  Left Lower Extremity Brace:  (Lymphedema brace )  Required Braces or Orthoses?: Yes    Subjective  Subjective: Pt reports sleeping well. \"I think that's the best nights sleep I've had\"   Comments: Pt pleasant and motivated for session this date. Patient Currently in Pain: Yes  Pain Level: 6  Pain Location: Back;Neck  Restrictions/Precautions: Fall Risk  Overall Orientation Status: Within Functional Limits     Pain Assessment  Pain Assessment: 0-10  Pain Level: 6  Pain Type: Chronic pain  Pain Location: Back, Neck    Objective  Cognition  Overall Cognitive Status: Impaired  Following Directions:  Follows two step commands  Attention Span: Appears intact  Memory: Decreased recall of recent events  Sequencing and Organization: Assistance required to generate solutions  Additional Comments: slower processing with tasks  Perception  Overall Perceptual Status: WFL  Balance  Sitting Balance: Supervision (with forward reaching/bending from w/c )                    Additional Activities: Other  Additional Activities: Pt engaged in [address] [place] [address] spatial perceptual board to address B hand coordination skills (focus on L hand) and visual tracking/visual perception. Pt requires minimal cueing for problem solving required to adjust shapes based on sizing in order to locate appropriate placement for all. Pt required 15-20 minutes to completes full board. ADL  Grooming: Setup (pt reports no difficulty with FM tasks involved)  UE Bathing: None (pt declined this date)  LE Bathing: None (pt declined this date. )  UE Dressing: Setup (per pt report. )  LE Dressing: Minimal assistance (Assist with footlevel dressing; see below for more details. )  Toileting: Minimal assistance (Jodee with hygiene per pt report. )  Additional Comments: LBD: pt requires assist for donning B personal knee high compression stockings; pt reports having compression brandon at home however as not needed recently prior to admission. Pt also requires assist in donning B compression sleeves over stockings due to decreased reach for threading feet through and managing velcro, writer suggested practice with use of reacher next self care session. Writer educates and provides pt with sock aide for donning B footies socks; pt completes with minimal assist this date. Pt reports requiring no assist threading shorts over feet this date, SBA to pull up. Pt reports set up provided. Assessment  Performance deficits / Impairments: Decreased ADL status; Decreased functional mobility ; Decreased strength;Decreased safe awareness;Decreased cognition;Decreased endurance;Decreased balance;Decreased high-level IADLs;Decreased coordination  Prognosis: Good  Discharge Recommendations: Patient would benefit from continued therapy after discharge;Home with assist PRN  Activity Tolerance: Patient Tolerated treatment well  Safety Devices in place: Yes  Type of devices: Left in chair;Call light within reach; All fall risk precautions in place  Equipment Recommendations  Equipment Needed:  (TBD)        Plan  Plan  Times per week: 5-7  Times per day: Twice a day  Current Treatment Recommendations: Self-Care / ADL, Strengthening, ROM, Balance Training, Functional Mobility Training, Endurance Training, Neuromuscular Re-education, Cognitive Reorientation, Pain Management, Safety Education & Training, Patient/Caregiver Education & Training, Equipment Evaluation, Education, & procurement, Home Management Training, Cognitive/Perceptual Training  Patient Goals   Patient goals : \"I would like to have energy and balance and to just be able to walk around and go to the store and walk around. \"   Short term goals  Time Frame for Short term goals: By 5-6 days  Short term goal 1: Pt will complete lower body dressing with CGA and good safety  Short term goal 2: Pt will complete functional trasnfers/mobility during self care tasks with supervision and good safety with use of least restrictive device  Short term goal 3: Pt will tolerate standing 5+ minutes during functional activity of choice with good safety   Short term goal 4: Pt will verbalize/demonstrate good understanding of energy conservation techniques to increase safety and independence with self care tasks  Short term goal 5: Pt will participate in 15+ minutes of therapeutic exercises/functional activities to increase safety and independence with self care tasks.    Long term goals  Time Frame for Long term goals : By discharge  Long term goal 1: Pt will complete BADLs with modified independence and good safety  Long term goal 2: Pt will complete functional transfers/mobility during self care tasks with modified independence and good safety with use of least restrictive device  Long term goal 3: Pt will tolerate standing 8+ minutes during functional activity of choice with good safety  Long term goal 4: Pt will complete simple meal prep/light house keeping task with modified independence and good safety Long term goal 5: Pt will verbalize/demonstrate good understanding of home safety/fall prevention to increase safety and independence with self care tasks.    Long term goals 6: Pt will demonstrated increased  strength and coordination during self care tasks as evident by and an improvement on the 9 hole peg test by 3 seconds and increased  strength by 5#        08/13/21 1112 08/13/21 1422   OT Individual Minutes   Time In 1001  --    Time Out 1104  --    Minutes 63  --    Minute Variance   Variance  --  27   Reason  --    (dialysis)     Electronically signed by Ruthy Goltz, COTA/L on 8/13/21 at 2:26 PM EDT

## 2021-08-13 NOTE — PROGRESS NOTES
Intrajugular dialysis catheter dressing has been changed with the assistance of nurse manager and another nurse on unit. Catheter entry point had excess bloody drainage. Red and blue capped tubing are stiff and bent down, causing the new dressing to roll down slightly. Nurse manager applied paper tape to the tubing to help prevent further tegaderm rolling. Next dressing change is Monday August 16, per protocol.

## 2021-08-13 NOTE — CONSULTS
[facility]    CONSULTATION / HISTORY AND PHYSICAL EXAMINATION            Date:   8/12/2021  Patient name:  Daly Abraham  Date of admission:  8/11/2021  5:47 PM  MRN:   [id number]  Account:  [de-identified]  YOB: 1960  PCP:    AFSANEH Hawkins CNP  Room:   [facility]/7468-21  Code Status:    Full Code    Physician Requesting Consult: Brittany Yanes MD    Reason for Consult:  medical management    Chief Complaint:     No chief complaint on file.     Right upper and lower extremity weakness  History Obtained From:     Patient medical record nursing staff    History of Present Illness:   15-year-old  lady morbidly obese class III BMI 40.74 initially admitted to [facility] with right arm weakness and altered mental status she had an MRI done which showed acute lacunar infarct of the left frontal lobe medically managed history of chronic kidney disease had acute kidney injury required hemodialysis with right-sided Mauricio catheter in place in the jugular vein with improving renal function hemodialysis on hold    Noted to have some epistaxis this morning refusing to have anterior nasal packing done patient on anticoagulants  Multiple comorbid condition including hypertension hyperlipidemia coronary disease congestive heart failure diabetes mellitus  Past Medical History:     Past Medical History:   Diagnosis Date    Backache, unspecified     CHF (congestive heart failure) (HCC)     Chronic kidney disease     Coronary atherosclerosis of artery bypass graft     Cramp of limb     Gallstones     Hypertension     Insomnia     Pneumonia     Type II or unspecified type diabetes mellitus with renal manifestations, not stated as uncontrolled(250.40)     Type II or unspecified type diabetes mellitus without mention of complication, not stated as uncontrolled     Unspecified vitamin D deficiency         Past Surgical History:     Past Surgical History:   Procedure Laterality Date    ANKLE FRACTURE SURGERY      BREAST SURGERY      CARPAL TUNNEL RELEASE      CHOLECYSTECTOMY, OPEN N/A     CORONARY ARTERY BYPASS GRAFT      x3    HAND SURGERY      KNEE ARTHROSCOPY      right    TONSILLECTOMY          Medications Prior to Admission:     Prior to Admission medications    Medication Sig Start Date End Date Taking? Authorizing Provider   busPIRone (BUSPAR) 7.5 MG tablet TAKE 1 TABLET BY MOUTH THREE TIMES DAILY 7/1/21   Historical Provider, MD   gabapentin (NEURONTIN) 300 MG capsule TAKE 1 CAPSULE BY MOUTH TWICE DAILY 6/21/21 7/23/21  AFSANEH Duong CNP   ELIQUIS 5 MG TABS tablet  6/5/21   Historical Provider, MD   blood glucose test strips (DEN CONTOUR TEST) strip 1 each by In Vitro route 3 times daily As needed. 4/30/21   AFSANEH Duong CNP   Dulaglutide (TRULICITY) 1.5 ZI/2.9RT SOPN 1.5 mg    Historical Provider, MD   insulin glargine (BASAGLAR KWIKPEN) 100 UNIT/ML injection pen Basaglar KwikPen U-100 Insulin 100 unit/mL (3 mL) subcutaneous   Inject 43 units twice a day by subcutaneous route as directed for 90 days.     Historical Provider, MD   insulin lispro, 1 Unit Dial, (HUMALOG KWIKPEN) 100 UNIT/ML SOPN Humalog KwikPen (U-100) Insulin 100 unit/mL subcutaneous   15 units with each meal plus 2-10 units as SS if BS>150    Historical Provider, MD   Biotin w/ Vitamins C & E (HAIR/SKIN/NAILS PO) Take 200 mg by mouth daily    Historical Provider, MD   allopurinol (ZYLOPRIM) 100 MG tablet Take 100 mg by mouth daily    Historical Provider, MD   sharps container 1 each by Does not apply route as needed (dirty pen needles) 4/19/21   AFSANEH Duong CNP   ferrous sulfate (BRIAN-ARCHIE) 325 (65 Fe) MG tablet Take 1 tablet by mouth daily 4/19/21   AFSANEH Duong CNP   allopurinol (ZYLOPRIM) 100 MG tablet Take 1 tablet by mouth daily 4/14/21   AFSANEH Duong CNP   isosorbide mononitrate (IMDUR) 30 MG extended release tablet Take 1 tablet by mouth daily 4/9/21   Jaki Miranda MD   traZODone (DESYREL) 50 MG tablet Take 75 mg by mouth nightly    Historical Provider, MD   bumetanide (BUMEX) 2 MG tablet Take 1 tablet by mouth daily 3/30/21   AFSANEH Nevarez CNP   atorvastatin (LIPITOR) 40 MG tablet Take 1 tablet by mouth daily 3/30/21   AFSANEH Nevarez CNP   carvedilol (COREG) 6.25 MG tablet Take 1 tablet by mouth 2 times daily 3/30/21   AFSANEH Nevarez CNP   ranolazine (RANEXA) 500 MG extended release tablet Take 1 tablet by mouth 2 times daily  Patient taking differently: Take 1,000 mg by mouth 2 times daily  3/30/21   AFSANEH Nevarez CNP   nitroGLYCERIN (NITROSTAT) 0.4 MG SL tablet Place 1 tablet under the tongue every 5 minutes as needed for Chest pain. 3/29/15   AFSANEH Anthony CNP   Insulin Pen Needle (BD ULTRA-FINE PEN NEEDLES) 29G X 12.7MM MISC 1 each by Does not apply route 6 times daily. For use with each insulin 12/23/14   Marissa Franks MD   docusate sodium (COLACE) 100 MG capsule Take 100 mg by mouth 2 times daily. Historical Provider, MD   Lancets 28G MISC Inject 1 each into the skin 3 times daily. 10/30/14   Marissa Franks MD        Allergies:     Adhesive tape, Ace inhibitors, Iv dye [iodides], and Metformin and related    Social History:     Tobacco:    reports that she has never smoked. She has never used smokeless tobacco.  Alcohol:      reports no history of alcohol use. Drug Use:  reports no history of drug use. Family History:     Family History   Problem Relation Age of Onset    Diabetes Father     Heart Failure Father        Review of Systems:     Positive and Negative as described in HPI. CONSTITUTIONAL:  negative for fevers, chills, sweats, fatigue, weight loss  HEENT:  negative for vision, hearing changes, runny nose, throat pain  Positive for epistaxis right nostril  RESPIRATORY:  negative for shortness of breath, cough, congestion, wheezing.   CARDIOVASCULAR: negative for chest pain, palpitations. GASTROINTESTINAL:  negative for nausea, vomiting, diarrhea, constipation, change in bowel habits, abdominal pain   GENITOURINARY:  negative for difficulty of urination, burning with urination, frequency   INTEGUMENT:  negative for rash, skin lesions, easy bruising   HEMATOLOGIC/LYMPHATIC:  negative for swelling/edema   ALLERGIC/IMMUNOLOGIC:  negative for urticaria , itching  ENDOCRINE:  negative increase in drinking, increase in urination, hot or cold intolerance  MUSCULOSKELETAL:  negative joint pains, muscle aches, swelling of joints  NEUROLOGICAL: Positive for right upper extremity and right lower extremity mild weakness  BEHAVIOR/PSYCH: Denies depression    Physical Exam:     BP (!) 105/55   Pulse 68   Temp 97.5 °F (36.4 °C) (Oral)   Resp 18   Ht 5' 5\" (1.651 m)   Wt 244 lb 12.8 oz (111 kg)   SpO2 92%   BMI 40.74 kg/m²   Temp (24hrs), Av.8 °F (36.6 °C), Min:97.5 °F (36.4 °C), Max:98.3 °F (36.8 °C)    Recent Labs     21  0550 21  1119 21  1603 21   POCGLU 192* 330* 271* 290*       Intake/Output Summary (Last 24 hours) at 20215  Last data filed at 2021 1945  Gross per 24 hour   Intake --   Output 0 ml   Net 0 ml     Dialysis catheter noted in the jugular vein right side of the neck  General Appearance:  alert, well appearing, and in no acute distress  Mental status: oriented to person, place, and time with normal affect  Head:  normocephalic, atraumatic.   Eye: no icterus, redness, pupils equal and reactive, extraocular eye movements intact, conjunctiva clear  Ear: normal external ear, no discharge, hearing intact  Nose:  no drainage noted  Small amount of epistaxis noted    Mouth: mucous membranes moist  Neck: supple, no carotid bruits, thyroid not palpable  Lungs: Bilateral equal air entry, clear to ausculation, no wheezing, rales or rhonchi, normal effort  Cardiovascular: normal rate, regular rhythm, no murmur, gallop, rub.  Abdomen: Soft, nontender, nondistended, normal bowel sounds, no hepatomegaly or splenomegaly  Neurologic: There are no new focal motor or sensory deficits, normal muscle tone and bulk, no abnormal sensation, normal speech, cranial nerves II through XII grossly intact  Positive for right upper and right lower extremity 4 out of 5 strength strength  Skin: No gross lesions, rashes, bruising or bleeding on exposed skin area  Extremities:  peripheral pulses palpable, no pedal edema or calf pain with palpation  Psych:      Investigations:      Laboratory Testing:  Recent Results (from the past 24 hour(s))   POC Glucose Fingerstick    Collection Time: 08/12/21  5:50 AM   Result Value Ref Range    POC Glucose 192 (H) 65 - 105 mg/dL   Basic Metabolic Panel w/ Reflex to MG    Collection Time: 08/12/21  6:19 AM   Result Value Ref Range    Glucose 215 (H) 70 - 99 mg/dL    BUN 57 (H) 8 - 23 mg/dL    CREATININE 2.98 (H) 0.50 - 0.90 mg/dL    Bun/Cre Ratio NOT REPORTED 9 - 20    Calcium 9.4 8.6 - 10.4 mg/dL    Sodium 139 135 - 144 mmol/L    Potassium 3.9 3.7 - 5.3 mmol/L    Chloride 102 98 - 107 mmol/L    CO2 25 20 - 31 mmol/L    Anion Gap 12 9 - 17 mmol/L    GFR Non-African American 16 (L) >60 mL/min    GFR  19 (L) >60 mL/min    GFR Comment          GFR Staging NOT REPORTED    CBC    Collection Time: 08/12/21  6:19 AM   Result Value Ref Range    WBC 8.6 3.5 - 11.0 k/uL    RBC 3.28 (L) 4.0 - 5.2 m/uL    Hemoglobin 10.6 (L) 12.0 - 16.0 g/dL    Hematocrit 31.8 (L) 36 - 46 %    MCV 96.9 80 - 100 fL    MCH 32.3 26 - 34 pg    MCHC 33.4 31 - 37 g/dL    RDW 17.0 (H) 11.5 - 14.9 %    Platelets 888 624 - 734 k/uL    MPV 8.4 6.0 - 12.0 fL    NRBC Automated NOT REPORTED per 100 WBC   POC Glucose Fingerstick    Collection Time: 08/12/21 11:19 AM   Result Value Ref Range    POC Glucose 330 (H) 65 - 105 mg/dL   POC Glucose Fingerstick    Collection Time: 08/12/21  4:03 PM   Result Value Ref Range    POC Glucose 271 (H) 65 - 105 mg/dL   POC Glucose Fingerstick    Collection Time: 08/12/21  8:16 PM   Result Value Ref Range    POC Glucose 290 (H) 65 - 105 mg/dL           Consultations:   IP CONSULT TO DIETITIAN  IP CONSULT TO SOCIAL WORK  IP CONSULT TO INTERNAL MEDICINE  IP CONSULT TO NEPHROLOGY  Assessment :      Primary Problem  <principal problem not specified>    Active Hospital Problems    Diagnosis Date Noted    Acute cerebrovascular accident (CVA) ([facility] [address] [I63.9] 08/11/2021       Plan:     64-year-old lady morbidly obese class III BMI 40.74 history of chronic kidney disease hypertension coronary disease chronic diastolic congestive heart failure  New diagnosis of acute lacunar infarct with right upper and lower extremity weakness  Acute on chronic kidney disease required hemodialysis with a Mauricio catheter  Diabetes mellitus with neuropathy Lantus sliding scale  Gabapentin continue for diabetic neuropathy  Anemia of chronic kidney disease received IV iron  Coronary artery disease continue carvedilol and Ranexa  Chronic diastolic congestive heart failure compensated continue Tonioix        Latonya Eric MD  8/12/2021  10:35 PM    Copy sent to Dr. Myra Ritchie, APRN - CNP    Please note that this chart was generated using voice recognition Dragon dictation software. Although every effort was made to ensure the accuracy of this automated transcription, some errors in transcription may have occurred.

## 2021-08-13 NOTE — PROGRESS NOTES
Speech Language Pathology  Speech Language Pathology  [facility] Acute Rehab Unit at [facility]    Cognitive Treatment Note    Date: 8/13/2021  Patients Name: Abiola Dangelo  MRN: [id number]  Diagnosis:   Patient Active Problem List   Diagnosis Code    Coronary artery disease I25.10    Chronic diastolic heart failure (HCC) I50.32    Diabetic polyneuropathy associated with type 2 diabetes mellitus (HCC) E11.42    History of coronary artery bypass graft Z95.1    Iron deficiency anemia D50.9    Spinal stenosis of lumbar region with neurogenic claudication M48.062    Mixed hyperlipidemia E78.2    Stage 4 chronic kidney disease ([facility] [address] N18.4    Type 2 diabetes mellitus with kidney complication, with long-term current use of insulin (HCC) E11.29, Z79.4    Syncope and collapse R55    Obesity, Class II, BMI 35-39.9 E66.9    Thyroid nodule greater than or equal to 1 cm in diameter incidentally noted on imaging study E04.1    Essential hypertension I10    Anemia in stage 4 chronic kidney disease (HCC) N18.4, D63.1    Chronic ischemic heart disease I25.9    Acute cerebrovascular accident (CVA) ([facility] [address] I63.9       Pain: 5/10    Cognitive Treatment    Treatment time: 0987-7541      Subjective: [x] Alert [x] Cooperative     [] Confused     [] Agitated    [] Lethargic      Objective/Assessment:  Attention: Functional throughout    Recall: paragraph recall- 67%, 74% c cues. ST educ. Pt. Re: compensatory recall strategies. Problem Solving/Reasoning: Project planning (deductive reasoning) puzzle- min to mod A x2. Other:     Plan:  [x] Continue ST services    [] Discharge from ST:      Discharge recommendations: [] Inpatient Rehab   [] [place]   [] Outpatient Therapy  [] Follow up at trauma clinic   [] Other:       Treatment completed by: Bill Quinn A.CCC/SLP

## 2021-08-13 NOTE — PROGRESS NOTES
HEMODIALYSIS POST TREATMENT NOTE    Treatment time ordered: 3.5 Hours    Actual treatment time: 3.5 Hours    UltraFiltration Goal: 6367-1115 ml as tolerated  UltraFiltration Removed: 2000 ml      Pre Treatment weight: 111.04 kg  Post Treatment weight: 108.54 kg  Estimated Dry Weight:     Access used:     Central Venous Catheter:          Tunneled or Non-tunneled: Non Tunneled           Site: Right neck          Access Flow: Positional, lines reversed throughout treatment      Internal Access:       AV Fistula or AV Graft:          Site:        Access Flow:        Sign and symptoms of infection:        If YES:     Medications or blood products given: None    Chronic outpatient schedule: Daily check, AURELIA    Chronic outpatient unit:     Summary of response to treatment: Patient tolerated treatment fairly. Target fluid goal met without need for interventions. Vitals remained stable throughout treatment. Blood returned without complications. Explain if orders NOT met, was physician notified:      Khushi Fallon faxed to patient unit/ placed in patient chart: Yes    Post assessment completed: Yes    Report given to: Shannon Leal RN      * Intra-treatment documented Safety Checks include the followin) Access and face visible at all times. 2) All connections and blood lines are secure with no kinks. 3) NVL alarm engaged. 4) Hemosafe device applied (if applicable). 5) No collapse of Arterial or Venous blood chambers. 6) All blood lines / pump segments in the air detectors.

## 2021-08-13 NOTE — PLAN OF CARE
pressure relief instruction, dressing changes, infection protection, DVT prophylaxis, assistance with safe transfers , and/or assistance with bathroom activities and hygiene. PHYSICAL THERAPY:  Goals:        Short term goals  Time Frame for Short term goals: 5 days   Short term goal 1: Pt. to perform bed mobility independently. Short term goal 2: Pt. to tolerate 30 to 45 minutes of therapeutic exercise to improve strength and endurance for increased functional mobility. Short term goal 3: Pt. to improve seated static and dynamic balance to good. Short term goal 4: Pt. to improve standing static balance to fair+ and standing dynamic balance to fair. Short term goal 5: Pt. to ambulate 200ft. with supervision using a rollator. Short term goal 6: Pt. to ascend/descend 3 steps with CGA using one railing  Long term goals  Time Frame for Long term goals : By DC  Long term goal 1: Pt. to perform all transfers independently or with Mod-I. Long term goal 2: Pt. to improve standing static and dynamic balance to good. Long term goal 3: Pt. to ambulate 100ft. on an unlevel/ inclined surface with Mod-I using a rollator. Long term goal 4: Pt. to ascend/descend one flight of stairs with supervision   Long term goal 5: Pt. to ambulate 200ft. in 2 minutes with Mod-I using a rollator. Long term goal 6: Pt. to improve PASS score from 24/36 to 35/36. Plan of Care: Pt to be seen by physical therapy services 1 Hour 30 Minutes per day at least 5 out of 7 days per week     Anticipated interventions may include therapeutic exercises, gait training, neuromuscular re-ed, transfer training, community reintegration, bed mobility, w/c mobility and training.       OCCUPATIONAL THERAPY:  Goals:             Short term goals  Time Frame for Short term goals: By 5-6 days  Short term goal 1: Pt will complete lower body dressing with CGA and good safety  Short term goal 2: Pt will complete functional trasnfers/mobility during self care tasks with supervision and good safety with use of least restrictive device  Short term goal 3: Pt will tolerate standing 5+ minutes during functional activity of choice with good safety   Short term goal 4: Pt will verbalize/demonstrate good understanding of energy conservation techniques to increase safety and independence with self care tasks  Short term goal 5: Pt will participate in 15+ minutes of therapeutic exercises/functional activities to increase safety and independence with self care tasks. Long term goals  Time Frame for Long term goals : By discharge  Long term goal 1: Pt will complete BADLs with modified independence and good safety  Long term goal 2: Pt will complete functional transfers/mobility during self care tasks with modified independence and good safety with use of least restrictive device  Long term goal 3: Pt will tolerate standing 8+ minutes during functional activity of choice with good safety  Long term goal 4: Pt will complete simple meal prep/light house keeping task with modified independence and good safety   Long term goal 5: Pt will verbalize/demonstrate good understanding of home safety/fall prevention to increase safety and independence with self care tasks. Long term goals 6: Pt will demonstrated increased  strength and coordination during self care tasks as evident by and an improvement on the 9 hole peg test by 3 seconds and increased  strength by 5#    Plan of Care: Patient to be seen by occupational therapy services 1 Hour 30 Minutes per day at least 5 out of 7 days per week     Anticipated interventions may include ADL and IADL retraining, strengthening, safety education and training, patient/caregiver education and training, equipment evaluation/ training/procurement, neuromuscular reeducation, wheelchair mobility training. SPEECH THERAPY:   Goals:             Short-term Goals  Goal 1:  Increase recall for extended lengths of information (paragraph level) to 90%  Goal 2: Increase high level deductive reasoning to 90%        Plan of Care: Pt to be seen by speech therapy services 1 Hour per day at least 5 out of 7 days per week    Anticipated interventions may include speech/language/communication therapy, cognitive training, group therapy, education, and/or dysphagia therapy based on the above goals. CASE MANAGEMENT:  Goals:   Assist patient/family with discharge planning, patient/family counseling,  and coordination with insurance during the inpatient rehabilitation stay. Other members of the multidisciplinary rehabilitation team that will be involved in the patient's plan of care include recreational therapy, dietary, respiratory therapy, and neuropsychology. Medical issues being managed closely and that require 24 hour availability of a physician:  Pain management, Infection protection, DVT prophylaxis, Fall precautions, Fluid/Electrolyte balance, Nutritional status, Respiratory needs, Anemia and History of heart disease                                           Physician anticipated functional outcomes: Improved independence with functional measures   Estimated length of stay for this admission 2 weeks  Medical Prognosis: Fair  Anticipated disposition: Home. The potential to achieve the above medical and rehabilitative goals is fair. This plan of care has been developed with the assistance and input of the multidisciplinary rehabilitation team.  The plan was reviewed with the patient on 8/13/2021. The patient has had the opportunity to provide input to the therapy team.    I have reviewed this Individualized Plan of Care and agree with its contents. Above documentation has been expanded, modified, adjusted to reflect the findings of my evaluations and goals for the patient.     Physician:  Electronically signed by Amanda Ceballos MD on 8/13/21 at 10:13 AM EDT

## 2021-08-14 LAB
ANION GAP SERPL CALCULATED.3IONS-SCNC: 12 MMOL/L (ref 9–17)
BUN BLDV-MCNC: 36 MG/DL (ref 8–23)
BUN/CREAT BLD: ABNORMAL (ref 9–20)
CALCIUM SERPL-MCNC: 8.9 MG/DL (ref 8.6–10.4)
CHLORIDE BLD-SCNC: 96 MMOL/L (ref 98–107)
CO2: 25 MMOL/L (ref 20–31)
CREAT SERPL-MCNC: 2.62 MG/DL (ref 0.5–0.9)
GFR AFRICAN AMERICAN: 22 ML/MIN
GFR NON-AFRICAN AMERICAN: 18 ML/MIN
GFR SERPL CREATININE-BSD FRML MDRD: ABNORMAL ML/MIN/{1.73_M2}
GFR SERPL CREATININE-BSD FRML MDRD: ABNORMAL ML/MIN/{1.73_M2}
GLUCOSE BLD-MCNC: 158 MG/DL (ref 70–99)
GLUCOSE BLD-MCNC: 161 MG/DL (ref 65–105)
GLUCOSE BLD-MCNC: 254 MG/DL (ref 65–105)
GLUCOSE BLD-MCNC: 268 MG/DL (ref 65–105)
GLUCOSE BLD-MCNC: 283 MG/DL (ref 65–105)
POTASSIUM SERPL-SCNC: 4.2 MMOL/L (ref 3.7–5.3)
SODIUM BLD-SCNC: 133 MMOL/L (ref 135–144)

## 2021-08-14 PROCEDURE — 1180000000 HC REHAB R&B

## 2021-08-14 PROCEDURE — 80048 BASIC METABOLIC PNL TOTAL CA: CPT

## 2021-08-14 PROCEDURE — 97535 SELF CARE MNGMENT TRAINING: CPT

## 2021-08-14 PROCEDURE — 97116 GAIT TRAINING THERAPY: CPT

## 2021-08-14 PROCEDURE — 99232 SBSQ HOSP IP/OBS MODERATE 35: CPT | Performed by: PHYSICAL MEDICINE & REHABILITATION

## 2021-08-14 PROCEDURE — 6370000000 HC RX 637 (ALT 250 FOR IP): Performed by: PHYSICAL MEDICINE & REHABILITATION

## 2021-08-14 PROCEDURE — 97129 THER IVNTJ 1ST 15 MIN: CPT

## 2021-08-14 PROCEDURE — 97530 THERAPEUTIC ACTIVITIES: CPT

## 2021-08-14 PROCEDURE — 82947 ASSAY GLUCOSE BLOOD QUANT: CPT

## 2021-08-14 PROCEDURE — 97130 THER IVNTJ EA ADDL 15 MIN: CPT

## 2021-08-14 PROCEDURE — 97110 THERAPEUTIC EXERCISES: CPT

## 2021-08-14 PROCEDURE — 99232 SBSQ HOSP IP/OBS MODERATE 35: CPT | Performed by: INTERNAL MEDICINE

## 2021-08-14 PROCEDURE — 1200000000 HC SEMI PRIVATE

## 2021-08-14 PROCEDURE — 36415 COLL VENOUS BLD VENIPUNCTURE: CPT

## 2021-08-14 RX ADMIN — FUROSEMIDE 80 MG: 40 TABLET ORAL at 07:44

## 2021-08-14 RX ADMIN — FEBUXOSTAT 40 MG: 40 TABLET, FILM COATED ORAL at 07:44

## 2021-08-14 RX ADMIN — BUSPIRONE HYDROCHLORIDE 7.5 MG: 7.5 TABLET ORAL at 20:54

## 2021-08-14 RX ADMIN — ATORVASTATIN CALCIUM 80 MG: 80 TABLET, FILM COATED ORAL at 20:54

## 2021-08-14 RX ADMIN — APIXABAN 5 MG: 5 TABLET, FILM COATED ORAL at 07:42

## 2021-08-14 RX ADMIN — INSULIN GLARGINE 48 UNITS: 100 INJECTION, SOLUTION SUBCUTANEOUS at 20:56

## 2021-08-14 RX ADMIN — RANOLAZINE 1000 MG: 1000 TABLET, FILM COATED, EXTENDED RELEASE ORAL at 07:45

## 2021-08-14 RX ADMIN — ASPIRIN 81 MG: 81 TABLET, CHEWABLE ORAL at 07:43

## 2021-08-14 RX ADMIN — INSULIN LISPRO 3 UNITS: 100 INJECTION, SOLUTION INTRAVENOUS; SUBCUTANEOUS at 20:56

## 2021-08-14 RX ADMIN — INSULIN LISPRO 2 UNITS: 100 INJECTION, SOLUTION INTRAVENOUS; SUBCUTANEOUS at 07:47

## 2021-08-14 RX ADMIN — APIXABAN 5 MG: 5 TABLET, FILM COATED ORAL at 20:54

## 2021-08-14 RX ADMIN — GABAPENTIN 300 MG: 300 CAPSULE ORAL at 07:45

## 2021-08-14 RX ADMIN — STANDARDIZED SENNA CONCENTRATE 17.2 MG: 8.6 TABLET ORAL at 20:59

## 2021-08-14 RX ADMIN — CARVEDILOL 6.25 MG: 6.25 TABLET, FILM COATED ORAL at 07:43

## 2021-08-14 RX ADMIN — RANOLAZINE 1000 MG: 1000 TABLET, FILM COATED, EXTENDED RELEASE ORAL at 20:53

## 2021-08-14 RX ADMIN — BUSPIRONE HYDROCHLORIDE 7.5 MG: 7.5 TABLET ORAL at 15:00

## 2021-08-14 RX ADMIN — TRAZODONE HYDROCHLORIDE 100 MG: 100 TABLET ORAL at 20:53

## 2021-08-14 RX ADMIN — BUSPIRONE HYDROCHLORIDE 7.5 MG: 7.5 TABLET ORAL at 07:43

## 2021-08-14 RX ADMIN — POLYETHYLENE GLYCOL 3350 17 G: 17 POWDER, FOR SOLUTION ORAL at 07:46

## 2021-08-14 RX ADMIN — INSULIN LISPRO 6 UNITS: 100 INJECTION, SOLUTION INTRAVENOUS; SUBCUTANEOUS at 11:06

## 2021-08-14 RX ADMIN — TAMSULOSIN HYDROCHLORIDE 0.4 MG: 0.4 CAPSULE ORAL at 07:46

## 2021-08-14 RX ADMIN — FUROSEMIDE 80 MG: 40 TABLET ORAL at 17:32

## 2021-08-14 RX ADMIN — INSULIN LISPRO 6 UNITS: 100 INJECTION, SOLUTION INTRAVENOUS; SUBCUTANEOUS at 16:48

## 2021-08-14 RX ADMIN — PANTOPRAZOLE SODIUM 40 MG: 40 TABLET, DELAYED RELEASE ORAL at 06:14

## 2021-08-14 RX ADMIN — GABAPENTIN 300 MG: 300 CAPSULE ORAL at 20:54

## 2021-08-14 RX ADMIN — INSULIN GLARGINE 48 UNITS: 100 INJECTION, SOLUTION SUBCUTANEOUS at 07:46

## 2021-08-14 ASSESSMENT — PAIN DESCRIPTION - LOCATION
LOCATION: BACK;NECK
LOCATION: NECK;BACK
LOCATION: NECK

## 2021-08-14 ASSESSMENT — PAIN SCALES - GENERAL
PAINLEVEL_OUTOF10: 5
PAINLEVEL_OUTOF10: 5
PAINLEVEL_OUTOF10: 8
PAINLEVEL_OUTOF10: 6

## 2021-08-14 ASSESSMENT — PAIN - FUNCTIONAL ASSESSMENT: PAIN_FUNCTIONAL_ASSESSMENT: PREVENTS OR INTERFERES SOME ACTIVE ACTIVITIES AND ADLS

## 2021-08-14 ASSESSMENT — PAIN DESCRIPTION - DESCRIPTORS: DESCRIPTORS: ACHING

## 2021-08-14 ASSESSMENT — PAIN DESCRIPTION - PAIN TYPE
TYPE: CHRONIC PAIN

## 2021-08-14 ASSESSMENT — PAIN DESCRIPTION - ONSET: ONSET: ON-GOING

## 2021-08-14 ASSESSMENT — PAIN DESCRIPTION - FREQUENCY: FREQUENCY: INTERMITTENT

## 2021-08-14 ASSESSMENT — PAIN DESCRIPTION - PROGRESSION: CLINICAL_PROGRESSION: NOT CHANGED

## 2021-08-14 NOTE — PROGRESS NOTES
Physical Medicine & Rehabilitation  Progress Note      Subjective:      59year-old female with ischemic CVA. Patient is reporting post-dialysis fatigue is improved today. She is having occasional muscle spasms. She has chronic mild L calf tenderness which is pre-existing and unchanged from baseline. No new issues with sleep, appetite, bowel, or bladder. She does have some oliguria. ROS:  Denies fevers, chills, sweats. No chest pain, palpitations, lightheadedness. Denies coughing, wheezing or shortness of breath. Denies abdominal pain, nausea, diarrhea or constipation. No new areas of joint pain. Denies new areas of numbness or weakness. Denies new anxiety or depression issues. No new skin problems. Rehabilitation:   Progressing in therapies. PT:  Restrictions/Precautions: Fall Risk  Required Braces or Orthoses  Right Lower Extremity Brace:  (Lymphedema brace )  Left Lower Extremity Brace:  (Lymphedema brace )   Transfers  Sit to Stand: Stand by assistance  Stand to sit: Stand by assistance  Bed to Chair: Stand by assistance  Stand Pivot Transfers: Stand by assistance  Lateral Transfers: Contact guard assistance  Comment: Transfers assessed with rolling walker. Steady, no LOB noted. Ambulation 1  Surface: level tile  Device: Rolling Walker  Assistance: Contact guard assistance  Quality of Gait: Slightly unsteady, no LOB noted. Decrease step length, increase LLE shakiness. Gait Deviations: Slow Annie, Decreased step height, Decreased step length  Distance: 148ft AM; 165ft, 47ft PM   Comments: toilet transfer in between distances in PM     Transfers  Sit to Stand: Stand by assistance  Stand to sit: Stand by assistance  Bed to Chair: Stand by assistance  Stand Pivot Transfers: Stand by assistance  Lateral Transfers: Contact guard assistance  Comment: Transfers assessed with rolling walker. Steady, no LOB noted.    Ambulation  Ambulation?: Yes  Ambulation 1  Surface: level tile  Device: Rolling Walker  Assistance: Contact guard assistance  Quality of Gait: Slightly unsteady, no LOB noted. Decrease step length, increase LLE shakiness. Gait Deviations: Slow Annie, Decreased step height, Decreased step length  Distance: 148ft AM; 165ft, 47ft PM   Comments: toilet transfer in between distances in PM     Surface: level tile  Ambulation 1  Surface: level tile  Device: Rolling Walker  Assistance: Contact guard assistance  Quality of Gait: Slightly unsteady, no LOB noted. Decrease step length, increase LLE shakiness. Gait Deviations: Slow Annie, Decreased step height, Decreased step length  Distance: 148ft AM; 165ft, 47ft PM   Comments: toilet transfer in between distances in PM     OT:  ADL  Feeding: Modified independent  (per pt report)  Grooming: Setup  UE Bathing: Stand by assistance  LE Bathing: Minimal assistance (A for bottom while standing )  UE Dressing: Setup  LE Dressing: Minimal assistance (Assist with footlevel dressing; see below for more details. )  Toileting: Minimal assistance (A for hygiene)  Additional Comments: LBD: pt requires assist for donning B personal knee high compression stockings; pt reports having compression brandon at home however as not needed recently prior to admission. Pt also requires assist in donning B compression sleeves over stockings due to decreased reach for threading feet through and managing velcro, writer suggested practice with use of reacher next self care session. Writer educates and provides pt with sock aide for donning B footies socks; pt completes with minimal assist this date. Pt reports requiring no assist threading shorts over feet this date, SBA to pull up. Pt reports set up provided.           Balance  Sitting Balance: Supervision  Standing Balance: Contact guard assistance (CGA-SBA)   Standing Balance  Time: 1-2 miuntes x 5  Activity: Functional transfers, functional mobility, lower body dressing, lower body bathing  Comment: with RW  Functional Mobility  Functional - Mobility Device: Rolling Walker  Activity: To/from bathroom  Assist Level: Contact guard assistance  Functional Mobility Comments: Verbal cues for hand placement and safety     Bed mobility  Bridging: Stand by assistance  Rolling to Left: Stand by assistance  Rolling to Right: Stand by assistance  Supine to Sit: Supervision  Scooting: Supervision  Comment: HOB elevated  Transfers  Sit to stand: Contact guard assistance  Stand to sit: Contact guard assistance  Transfer Comments: Verbal cues for hand placement and safety   Toilet Transfers  Toilet - Technique: Ambulating  Equipment Used: Raised toilet seat with rails  Toilet Transfer: Contact guard assistance  Toilet Transfers Comments: Increased time with verbal cues for hand placement and safety     Shower Transfers  Shower - Transfer To: Transfer tub bench  Shower - Technique: Ambulating  Shower Transfers: Contact Guard  Shower Transfers Comments: Increased time with verbal cues for safety over threshold. SPEECH:  Subjective: [x]? Alert     [x]? Cooperative     []? Confused     []? Agitated    []? Lethargic        Objective/Assessment:  Attention: Functional throughout     Recall: paragraph recall- 100%. ST educ. Pt. Re: compensatory recall strategies.      Problem Solving/Reasoning: Deductive reasoning  Puzzle x2 - min to mod A       Other:     Objective:  /82   Pulse 65   Temp 97.6 °F (36.4 °C) (Oral)   Resp 16   Ht 5' 5\" (1.651 m)   Wt 239 lb 3.2 oz (108.5 kg)   SpO2 96%   BMI 39.80 kg/m²       GEN: Well developed, well nourished, in NAD  HEENT:  NCAT. PERRL. EOMI. Mucous membranes pink and moist. R IJ triple lumen in place. PULM:  Clear to ausculation. No rales or rhonchi. Respirations WNL and unlabored. CV:  bradycardic rate regular rhythm. No murmurs or gallops. GI:  Abdomen soft. Nontender. Non-distended. BS + and equal.    NEUROLOGICAL: A&O x3. Sensation intact to light touch.     MSK:  Functional ROM all extremities. Motor testing 4+/5 key muscles LUE and LLE. 4/5 key muscles RUE and RLE. Lorean Snare SKIN: Warm dry and intact. Good turgor. Scattered ecchymosis BUEs. EXTREMITIES:  No calf tenderness to palpation. Chronic stable BLE lymphedema. PSYCH: Mood WNL. Appropriately interactive. Affect WNL. Diagnostics:     CBC:   Recent Labs     08/12/21  0619   WBC 8.6   RBC 3.28*   HGB 10.6*   HCT 31.8*   MCV 96.9   RDW 17.0*        BMP:   Recent Labs     08/12/21  0619 08/13/21  0712 08/14/21  0648    139 133*   K 3.9 4.3 4.2    100 96*   CO2 25 27 25   BUN 57* 65* 36*   CREATININE 2.98* 3.71* 2.62*   GLUCOSE 215* 144* 158*     BNP: No results for input(s): BNP in the last 72 hours. PT/INR: No results for input(s): PROTIME, INR in the last 72 hours. APTT: No results for input(s): APTT in the last 72 hours. CARDIAC ENZYMES: No results for input(s): CKMB, CKMBINDEX, TROPONINT in the last 72 hours. Invalid input(s): CKTOTAL;3  FASTING LIPID PANEL:  Lab Results   Component Value Date    CHOL 105 04/09/2015    HDL 43 04/09/2015    TRIG 168 04/09/2015     LIVER PROFILE: No results for input(s): AST, ALT, ALB, BILIDIR, BILITOT, ALKPHOS in the last 72 hours.      Current Medications:   Current Facility-Administered Medications: sodium citrate 4 % injection 1.3 mL, 1.3 mL, Intracatheter, PRN  sodium citrate 4 % injection 1.3 mL, 1.3 mL, Intracatheter, PRN  traZODone (DESYREL) tablet 100 mg, 100 mg, Oral, Nightly  sodium chloride (OCEAN, BABY AYR) 0.65 % nasal spray 1 spray, 1 spray, Each Nostril, PRN  acetaminophen (TYLENOL) tablet 650 mg, 650 mg, Oral, Q4H PRN  polyethylene glycol (GLYCOLAX) packet 17 g, 17 g, Oral, Daily  senna (SENOKOT) tablet 17.2 mg, 2 tablet, Oral, Daily PRN  bisacodyl (DULCOLAX) suppository 10 mg, 10 mg, Rectal, Daily PRN  febuxostat (ULORIC) tablet 40 mg, 40 mg, Oral, Daily  tamsulosin (FLOMAX) capsule 0.4 mg, 0.4 mg, Oral, Daily  gabapentin (NEURONTIN) capsule 300 mg, 300 mg, Oral, BID  furosemide (LASIX) tablet 80 mg, 80 mg, Oral, BID  pantoprazole (PROTONIX) tablet 40 mg, 40 mg, Oral, QAM AC  ranolazine (RANEXA) extended release tablet 1,000 mg, 1,000 mg, Oral, BID  insulin lispro (HUMALOG) injection vial 0-12 Units, 0-12 Units, Subcutaneous, TID WC  insulin lispro (HUMALOG) injection vial 0-6 Units, 0-6 Units, Subcutaneous, Nightly  glucose (GLUTOSE) 40 % oral gel 15 g, 15 g, Oral, PRN  dextrose 50 % IV solution, 12.5 g, Intravenous, PRN  glucagon (rDNA) injection 1 mg, 1 mg, Intramuscular, PRN  dextrose 5 % solution, 100 mL/hr, Intravenous, PRN  apixaban (ELIQUIS) tablet 5 mg, 5 mg, Oral, BID  aspirin chewable tablet 81 mg, 81 mg, Oral, Daily  atorvastatin (LIPITOR) tablet 80 mg, 80 mg, Oral, Nightly  busPIRone (BUSPAR) tablet 7.5 mg, 7.5 mg, Oral, TID  carvedilol (COREG) tablet 6.25 mg, 6.25 mg, Oral, BID WC  ferrous sulfate (IRON 325) tablet 325 mg, 325 mg, Oral, BID WC  insulin glargine (LANTUS) injection vial 48 Units, 48 Units, Subcutaneous, BID      Impression/Plan:   Impaired ADLs, gait, and mobility due to:      1. Ischemic CVA L frontal lobe with mild R dominant hemiplegia:  PT/OT for gait, mobility, strengthening, endurance, ADLs, and self care. On ASA, atorvastatin  2. HTN/CAD/Angina/Diastolic CHF: on carvedilol, furosemide, Ranexa  3. Insomnia: on Trazodone - was taking 75 mg at home - increased to 100 mg. Sleep improved. 4. Anxiety: on buspirone  5. DM with neuropathy: on lantus, sliding scale, gabapentin  6. AURELIA on CKD IV: Had HD at [place] [address]. Nephrology following - continuing lasix at 80 mg BID. Had dialysis 8/13 - planning HD Monday 8/16 prn. Nursing contacting nephrology regarding triple lumen maintenance. 7. Anemia of chronic disease: on ferrous sulfate. Had IV iron at [facility]. Hb low but stable. Monitoring weekly  8. Urine retention: on flomax  9. Bowel Management: Miralax daily, senokot prn, dulcolax prn. Noting some constipation secondary to iron.    10. DVT Prophylaxis:  SCD's while in bed, AGUSTIN's and Eliquis  11. Internal Medicine for medical management    Electronically signed by Kamari Knight MD on 8/14/2021 at 11:03 AM      This note is created with the assistance of a speech recognition program.  While intending to generate a document that actually reflects the content of the visit, the document can still have some errors including those of syntax and sound a like substitutions which may escape proof reading. In such instances, actual meaning can be extrapolated by contextual diversion.

## 2021-08-14 NOTE — PROGRESS NOTES
[place] [id number] Internal Medicine    CONSULTATION / HISTORY AND PHYSICAL EXAMINATION            Date:   8/14/2021  Patient name:  Terry Galeas  Date of admission:  8/11/2021  5:47 PM  MRN:   [id number]  Account:  [de-identified]  YOB: 1960  PCP:    AFSANEH Alvarado CNP  Room:   [address] [place] [address]  Code Status:    Full Code    Physician Requesting Consult: Vamsi Weems MD    Reason for Consult:  medical management    Chief Complaint:     No chief complaint on file.     Right upper and lower extremity weakness  History Obtained From:     Patient medical record nursing staff    History of Present Illness:   27-year-old  lady morbidly obese class III BMI 40.74 initially admitted to [facility] with right arm weakness and altered mental status she had an MRI done which showed acute lacunar infarct of the left frontal lobe medically managed history of chronic kidney disease had acute kidney injury required hemodialysis with right-sided Mauricio catheter in place in the jugular vein with improving renal function hemodialysis on hold    Noted to have some epistaxis this morning refusing to have anterior nasal packing done patient on anticoagulants  Multiple comorbid condition including hypertension hyperlipidemia coronary disease congestive heart failure diabetes mellitus    8/14   Patient is doing much better today  No new complaints  Past Medical History:     Past Medical History:   Diagnosis Date    Backache, unspecified     CHF (congestive heart failure) (HCC)     Chronic kidney disease     Coronary atherosclerosis of artery bypass graft     Cramp of limb     Gallstones     Hypertension     Insomnia     Pneumonia     Type II or unspecified type diabetes mellitus with renal manifestations, not stated as uncontrolled(250.40)     Type II or unspecified type diabetes mellitus without mention of complication, not stated as uncontrolled     Unspecified vitamin D deficiency         Past Surgical History:     Past Surgical History:   Procedure Laterality Date    ANKLE FRACTURE SURGERY      BREAST SURGERY      CARPAL TUNNEL RELEASE      CHOLECYSTECTOMY, OPEN N/A     CORONARY ARTERY BYPASS GRAFT      x3    HAND SURGERY      KNEE ARTHROSCOPY      right    TONSILLECTOMY          Medications Prior to Admission:     Prior to Admission medications    Medication Sig Start Date End Date Taking? Authorizing Provider   busPIRone (BUSPAR) 7.5 MG tablet TAKE 1 TABLET BY MOUTH THREE TIMES DAILY 7/1/21   Historical Provider, MD   gabapentin (NEURONTIN) 300 MG capsule TAKE 1 CAPSULE BY MOUTH TWICE DAILY 6/21/21 7/23/21  AFSANEH Ca CNP   ELIQUIS 5 MG TABS tablet  6/5/21   Historical Provider, MD   blood glucose test strips (DEN CONTOUR TEST) strip 1 each by In Vitro route 3 times daily As needed. 4/30/21   AFSANEH Ca CNP   Dulaglutide (TRULICITY) 1.5 RH/1.4AU SOPN 1.5 mg    Historical Provider, MD   insulin glargine (BASAGLAR KWIKPEN) 100 UNIT/ML injection pen Basaglar KwikPen U-100 Insulin 100 unit/mL (3 mL) subcutaneous   Inject 43 units twice a day by subcutaneous route as directed for 90 days.     Historical Provider, MD   insulin lispro, 1 Unit Dial, (HUMALOG KWIKPEN) 100 UNIT/ML SOPN Humalog KwikPen (U-100) Insulin 100 unit/mL subcutaneous   15 units with each meal plus 2-10 units as SS if BS>150    Historical Provider, MD   Biotin w/ Vitamins C & E (HAIR/SKIN/NAILS PO) Take 200 mg by mouth daily    Historical Provider, MD   allopurinol (ZYLOPRIM) 100 MG tablet Take 100 mg by mouth daily    Historical Provider, MD   sharps container 1 each by Does not apply route as needed (dirty pen needles) 4/19/21   AFSANEH Ca CNP   ferrous sulfate (BRIAN-ARCHIE) 325 (65 Fe) MG tablet Take 1 tablet by mouth daily 4/19/21   AFSANEH Ca CNP   allopurinol (ZYLOPRIM) 100 MG tablet Take 1 tablet by mouth daily 4/14/21   AFSANEH Ca CNP isosorbide mononitrate (IMDUR) 30 MG extended release tablet Take 1 tablet by mouth daily 4/9/21   Noah Gomes MD   traZODone (DESYREL) 50 MG tablet Take 75 mg by mouth nightly    Historical Provider, MD   bumetanide (BUMEX) 2 MG tablet Take 1 tablet by mouth daily 3/30/21   AFSANEH Wheeler CNP   atorvastatin (LIPITOR) 40 MG tablet Take 1 tablet by mouth daily 3/30/21   AFSANEH Wheeler CNP   carvedilol (COREG) 6.25 MG tablet Take 1 tablet by mouth 2 times daily 3/30/21   AFSANEH Wheeler CNP   ranolazine (RANEXA) 500 MG extended release tablet Take 1 tablet by mouth 2 times daily  Patient taking differently: Take 1,000 mg by mouth 2 times daily  3/30/21   AFSANEH Wheeler CNP   nitroGLYCERIN (NITROSTAT) 0.4 MG SL tablet Place 1 tablet under the tongue every 5 minutes as needed for Chest pain. 3/29/15   AFSANEH Vega CNP   Insulin Pen Needle (BD ULTRA-FINE PEN NEEDLES) 29G X 12.7MM MISC 1 each by Does not apply route 6 times daily. For use with each insulin 12/23/14   Jing Doss MD   docusate sodium (COLACE) 100 MG capsule Take 100 mg by mouth 2 times daily. Historical Provider, MD   Lancets 28G MISC Inject 1 each into the skin 3 times daily. 10/30/14   Jing Doss MD        Allergies:     Adhesive tape, Ace inhibitors, Iv dye [iodides], and Metformin and related    Social History:     Tobacco:    reports that she has never smoked. She has never used smokeless tobacco.  Alcohol:      reports no history of alcohol use. Drug Use:  reports no history of drug use. Family History:     Family History   Problem Relation Age of Onset    Diabetes Father     Heart Failure Father        Review of Systems:     Positive and Negative as described in HPI.     CONSTITUTIONAL:  negative for fevers, chills, sweats, fatigue, weight loss  HEENT:  negative for vision, hearing changes, runny nose, throat pain    RESPIRATORY:  negative for shortness of breath, cough, congestion, wheezing. CARDIOVASCULAR:  negative for chest pain, palpitations. GASTROINTESTINAL:  negative for nausea, vomiting, diarrhea, constipation, change in bowel habits, abdominal pain   GENITOURINARY:  negative for difficulty of urination, burning with urination, frequency   INTEGUMENT:  negative for rash, skin lesions, easy bruising   HEMATOLOGIC/LYMPHATIC:  negative for swelling/edema   ALLERGIC/IMMUNOLOGIC:  negative for urticaria , itching  ENDOCRINE:  negative increase in drinking, increase in urination, hot or cold intolerance  MUSCULOSKELETAL:  negative joint pains, muscle aches, swelling of joints  NEUROLOGICAL: Positive for right upper extremity and right lower extremity mild weakness  BEHAVIOR/PSYCH: Denies depression    Physical Exam:     /64   Pulse 71   Temp 97.6 °F (36.4 °C) (Oral)   Resp 16   Ht 5' 5\" (1.651 m)   Wt 239 lb 3.2 oz (108.5 kg)   SpO2 93%   BMI 39.80 kg/m²   Temp (24hrs), Av.7 °F (36.5 °C), Min:97.6 °F (36.4 °C), Max:97.7 °F (36.5 °C)    Recent Labs     21  1816 21  1929 21  0633 21  1056   POCGLU 129* 193* 161* 268*       Intake/Output Summary (Last 24 hours) at 2021 1504  Last data filed at 2021 1810  Gross per 24 hour   Intake 500 ml   Output 2500 ml   Net -2000 ml     Dialysis catheter noted in the jugular vein right side of the neck  General Appearance:  alert, well appearing, and in no acute distress  Mental status: oriented to person, place, and time with normal affect  Head:  normocephalic, atraumatic.   Eye: no icterus, redness, pupils equal and reactive, extraocular eye movements intact, conjunctiva clear  Ear: normal external ear, no discharge, hearing intact  Nose:  no drainage noted  Small amount of epistaxis noted    Mouth: mucous membranes moist  Neck: supple, no carotid bruits, thyroid not palpable  Lungs: Bilateral equal air entry, clear to ausculation, no wheezing, rales or rhonchi, normal effort  Cardiovascular: normal rate, regular rhythm, no murmur, gallop, rub. Abdomen: Soft, nontender, nondistended, normal bowel sounds, no hepatomegaly or splenomegaly  Neurologic: There are no new focal motor or sensory deficits, normal muscle tone and bulk, no abnormal sensation, normal speech, cranial nerves II through XII grossly intact  Positive for right upper and right lower extremity 4 out of 5 strength strength  Skin: No gross lesions, rashes, bruising or bleeding on exposed skin area  Extremities:  peripheral pulses palpable, no pedal edema or calf pain with palpation  Psych:      Investigations:      Laboratory Testing:  Recent Results (from the past 24 hour(s))   POC Glucose Fingerstick    Collection Time: 08/13/21  6:16 PM   Result Value Ref Range    POC Glucose 129 (H) 65 - 105 mg/dL   POC Glucose Fingerstick    Collection Time: 08/13/21  7:29 PM   Result Value Ref Range    POC Glucose 193 (H) 65 - 105 mg/dL   POC Glucose Fingerstick    Collection Time: 08/14/21  6:33 AM   Result Value Ref Range    POC Glucose 161 (H) 65 - 105 mg/dL   Basic Metabolic Prof    Collection Time: 08/14/21  6:48 AM   Result Value Ref Range    Glucose 158 (H) 70 - 99 mg/dL    BUN 36 (H) 8 - 23 mg/dL    CREATININE 2.62 (H) 0.50 - 0.90 mg/dL    Bun/Cre Ratio NOT REPORTED 9 - 20    Calcium 8.9 8.6 - 10.4 mg/dL    Sodium 133 (L) 135 - 144 mmol/L    Potassium 4.2 3.7 - 5.3 mmol/L    Chloride 96 (L) 98 - 107 mmol/L    CO2 25 20 - 31 mmol/L    Anion Gap 12 9 - 17 mmol/L    GFR Non-African American 18 (L) >60 mL/min    GFR  22 (L) >60 mL/min    GFR Comment          GFR Staging NOT REPORTED    POC Glucose Fingerstick    Collection Time: 08/14/21 10:56 AM   Result Value Ref Range    POC Glucose 268 (H) 65 - 105 mg/dL           Consultations:   IP CONSULT TO DIETITIAN  IP CONSULT TO SOCIAL WORK  IP CONSULT TO INTERNAL MEDICINE  IP CONSULT TO NEPHROLOGY  Assessment :      Primary Problem  <principal problem not specified>    Active Hospital Problems Diagnosis Date Noted    Acute cerebrovascular accident (CVA) ([facility] [I63.9] 08/11/2021   Active Problems:    Coronary artery disease    Chronic diastolic heart failure ([facility]    Diabetic polyneuropathy associated with type 2 diabetes mellitus ([facility]    History of coronary artery bypass graft    Mixed hyperlipidemia    Essential hypertension    Anemia in stage 4 chronic kidney disease ([facility]    Chronic ischemic heart disease    Acute cerebrovascular accident (CVA) ([facility]  Resolved Problems:    * No resolved hospital problems. *        Plan:     29-year-old lady morbidly obese class III BMI 40.74 history of chronic kidney disease hypertension coronary disease chronic diastolic congestive heart failure  New diagnosis of acute lacunar infarct with right upper and lower extremity weakness  Acute on chronic kidney disease required hemodialysis with a Mauricio catheter  Diabetes mellitus with neuropathy Lantus sliding scale, some readings of high blood sugars, monitoring closely  Gabapentin continue for diabetic neuropathy  Anemia of chronic kidney disease received IV iron  Coronary artery disease continue carvedilol and Ranexa  Chronic diastolic congestive heart failure compensated continue Lasix  Plan for hd per nephrologist          Dylan Allison MD  8/14/2021  3:04 PM    Copy sent to Dr. Mackenzie Castañeda, APRN - CNP    Please note that this chart was generated using voice recognition Dragon dictation software. Although every effort was made to ensure the accuracy of this automated transcription, some errors in transcription may have occurred.

## 2021-08-14 NOTE — PLAN OF CARE
Artemio Ocampo RN  Outcome: Ongoing  Goal: Absence of falls  8/14/2021 0403 by Vladimir Multani RN  Outcome: Ongoing  8/13/2021 2121 by Artemio Ocampo RN  Outcome: Ongoing     Problem: Nutrition  Goal: Optimal nutrition therapy  8/14/2021 0403 by Vladimir Multani RN  Outcome: Ongoing  8/13/2021 2121 by Artemio Ocampo RN  Outcome: Ongoing

## 2021-08-14 NOTE — PLAN OF CARE
Problem: Falls - Risk of:  Goal: Will remain free from falls  Description: Will remain free from falls  Outcome: Ongoing  Goal: Absence of physical injury  Description: Absence of physical injury  Outcome: Ongoing     Problem: Skin Integrity:  Goal: Will show no infection signs and symptoms  Description: Will show no infection signs and symptoms  Outcome: Ongoing  Goal: Absence of new skin breakdown  Description: Absence of new skin breakdown  Outcome: Ongoing     Problem: Pain:  Goal: Pain level will decrease  Description: Pain level will decrease  Outcome: Ongoing  Goal: Control of acute pain  Description: Control of acute pain  Outcome: Ongoing  Goal: Control of chronic pain  Description: Control of chronic pain  Outcome: Ongoing     Problem: Musculor/Skeletal Functional Status  Goal: Highest potential functional level  Outcome: Ongoing  Goal: Absence of falls  Outcome: Ongoing     Problem: Nutrition  Goal: Optimal nutrition therapy  Outcome: Ongoing

## 2021-08-14 NOTE — PLAN OF CARE
Gets up with PT and does well. Still has mild weakness of the right leg/arm. No falls or injury. Alert et oriented. Has occasional expressive aphasia and notes that words sometimes change location on the page of her puzzle. Mild right side mouth droop and tongue deviation. No difficulty swallowing. Had muscle sasm of left leg x1 during assessment when evaluating strength with resistance- it lasted approximately 25 seconds and then relaxed on its own. Spasm is painful but doesn't require pain meds. Very good appetite. Skin is intact. Continues to have 2-3+ lymphedema, but is worse on the left. Used Lymphedema pump for one hour x1 today while she rested in bed. She felt it helped her edema lessen a little. Has had no c/o pain today and has not required analgesic.   Moves self well in bed and with PT.

## 2021-08-14 NOTE — PROGRESS NOTES
Physical Therapy  Facility/Department: [facility] ACUTE REHAB  Daily Treatment Note  NAME: Mallory Tapia  : 1960  MRN: [id number]    Date of Service: 2021    Discharge Recommendations:  Patient would benefit from continued therapy after discharge, Home with assist PRN   PT Equipment Recommendations  Equipment Needed: No    Assessment   Body structures, Functions, Activity limitations: Decreased functional mobility ; Decreased strength;Decreased endurance;Decreased balance; Increased pain  Treatment Diagnosis: CVA  REQUIRES PT FOLLOW UP: Yes  Activity Tolerance  Activity Tolerance: Patient limited by endurance; Patient limited by fatigue     Patient Diagnosis(es): There were no encounter diagnoses. has a past medical history of Backache, unspecified, CHF (congestive heart failure) ([facility] [address] Chronic kidney disease, Coronary atherosclerosis of artery bypass graft, Cramp of limb, Gallstones, Hypertension, Insomnia, Pneumonia, Type II or unspecified type diabetes mellitus with renal manifestations, not stated as uncontrolled(250.40), Type II or unspecified type diabetes mellitus without mention of complication, not stated as uncontrolled, and Unspecified vitamin D deficiency. has a past surgical history that includes Coronary artery bypass graft; Knee arthroscopy; Carpal tunnel release; Breast surgery; Tonsillectomy; Hand surgery; Ankle fracture surgery; and Cholecystectomy, open (N/A). Restrictions  Restrictions/Precautions  Restrictions/Precautions: Fall Risk  Required Braces or Orthoses?: Yes (Lymphedema wraps)  Required Braces or Orthoses  Right Lower Extremity Brace:  (Lymphedema brace )  Left Lower Extremity Brace:  (Lymphedema brace )  Subjective   General  Chart Reviewed: Yes  Additional Pertinent Hx: Presented with acute right arm weakness and mental status change. MRI brain showed probable acute to subacute lacunar infarct of the left frontal lobe.   AURELIA on CKD stage 4 likely d/t contrast induced nephropathy. Dialysis was initiated on 8/6/21, had 2 treatment session, labs improving. Pt. has history of diabetes mellitus type II, CAD s/p CABG (2005), cervical stenosis, back pain, lymphedema, diastolic CHF, DVT, urinary retention, and iron deficiency anemia. Pt. admitted to ARU from [place] [address] 8/11/21. Family / Caregiver Present: No  Referring Practitioner: Dr. Neyda Rutledge: Patient very pleasant and motivated for therapy. Complaint of increased fatigue this day, contributes to dialysis yesterday. Patient tearful in afternoon, states she \"can't do anything\" and feels \"very alone. \" RN notified  General Comment  Comments: Patient complaint of intermittent dizziness/\"fog\" throughout session. Increased rest breaks required. Vitals monitored below. Pain Screening  Patient Currently in Pain: Yes  Pain Assessment  Pain Assessment: 0-10  Pain Level: 6  Pain Type: Chronic pain  Pain Location: Neck;Back  Vital Signs  Pulse: 71  BP: 114/64  BP Location: Left upper arm  Patient Position: Sitting  Level of Consciousness: Alert (0)  Patient Currently in Pain: Yes  Oxygen Therapy  SpO2: 93 %  Pulse Oximeter Device Mode: Intermittent  Pulse Oximeter Device Location: Finger  O2 Device: None (Room air)       Objective   Bed mobility  Bridging: Stand by assistance  Rolling to Left: Stand by assistance  Rolling to Right: Stand by assistance  Supine to Sit: Stand by assistance  Sit to Supine: Stand by assistance  Scooting: Stand by assistance  Transfers  Sit to Stand: Stand by assistance;Contact guard assistance  Stand to sit: Stand by assistance;Contact guard assistance  Bed to Chair: Stand by assistance  Stand Pivot Transfers: Stand by assistance  Ambulation  Ambulation?: Yes  Ambulation 1  Surface: level tile  Device: Rolling Walker  Assistance: Contact guard assistance  Quality of Gait: Slightly unsteady, no LOB noted. Decrease step length, increase LLE shakiness.    Gait Deviations: Slow Annie;Decreased step height;Decreased step length  Distance: 75 ft  Comments: Patient more fatigued this date. Ambulation 2  Surface - 2: level tile  Device 2: Rollator  Assistance 2: Contact guard assistance;Minimal assistance  Distance: 60 ft; 50 ft x2  Comments: Good demonstration of break safety, slightly unsteady, cues for pacing. Stairs/Curb  Stairs?: Yes  Stairs  # Steps : 5 (5 steps x2; seated rest break between ambulation distances)     Other exercises  Other exercises?: Yes  Other exercises 1: Seated (B) LE exercises x20 (2#, lime green theraband)  Other exercises 2: Small hurdles at yang rail  Other exercises 3: Standing balance on blue foam mat (no UE support 10-20 seconds)  Other exercises 4: Standing balance activities x25 minutes (0-1 UE support)  Other exercises 5: Donned sequential lymphedima pumps at end of PM session per patient request. Run time set for 1 hour, patient to notify RN when cycle completed  Other exercises 6: Forward/retro/lateral ambulation at yang rail      Other Activities:  (Toileting, patient urinates and performs stalin care, CGA for balance during stalin care)     Goals  Short term goals  Time Frame for Short term goals: 5 days   Short term goal 1: Pt. to perform bed mobility independently. Short term goal 2: Pt. to tolerate 30 to 45 minutes of therapeutic exercise to improve strength and endurance for increased functional mobility. Short term goal 3: Pt. to improve seated static and dynamic balance to good. Short term goal 4: Pt. to improve standing static balance to fair+ and standing dynamic balance to fair. Short term goal 5: Pt. to ambulate 200ft. with supervision using a rollator. Short term goal 6: Pt. to ascend/descend 3 steps with CGA using one railing  Long term goals  Time Frame for Long term goals : By DC  Long term goal 1: Pt. to perform all transfers independently or with Mod-I. Long term goal 2: Pt. to improve standing static and dynamic balance to good.    Long term goal 3: Pt. to ambulate 100ft. on an unlevel/ inclined surface with Mod-I using a rollator. Long term goal 4: Pt. to ascend/descend one flight of stairs with supervision   Long term goal 5: Pt. to ambulate 200ft. in 2 minutes with Mod-I using a rollator. Long term goal 6: Pt. to improve PASS score from 24/36 to 35/36. Patient Goals   Patient goals : Improve balance and strength to be able to walk independently with rollator. Plan    Plan  Times per week: 1.5hrs/day  Times per day: Daily  Specific instructions for Next Treatment: Balance training, transfer training, gait training  Current Treatment Recommendations: Strengthening, Balance Training, Transfer Training, Functional Mobility Training, ROM, Endurance Training, Gait Training, Stair training, Pain Management, Home Exercise Program, Safety Education & Training, Neuromuscular Re-education, Patient/Caregiver Education & Training, Equipment Evaluation, Education, & procurement  Safety Devices  Type of devices:  All fall risk precautions in place, Call light within reach, Gait belt, Patient at risk for falls, Nurse notified (Left in chair in AM; Left in bed in PM)        08/14/21 1222 08/14/21 1531   PT Individual Minutes   Time In 0839 1430   Time Out 0929 1519   Minutes 50 [address] [place], [place]

## 2021-08-14 NOTE — PROGRESS NOTES
Spoke to Dr. Reina Fraire (Nephrologist) via phone to give update on the patient lab results for today. States he will give the patient a break from dialysis for the weekend. Next planned dialysis day will be Monday 8/16/21 pending lab results.

## 2021-08-14 NOTE — PROGRESS NOTES
Speech Language Pathology  Speech Language Pathology  [facility] Acute Rehab Unit at [facility]    Cognitive Treatment Note    Date: 8/14/2021  Patients Name: Tanvi Evans  MRN: [id number]  Diagnosis:   Patient Active Problem List   Diagnosis Code    Coronary artery disease I25.10    Chronic diastolic heart failure (HCC) I50.32    Diabetic polyneuropathy associated with type 2 diabetes mellitus (HCC) E11.42    History of coronary artery bypass graft Z95.1    Iron deficiency anemia D50.9    Spinal stenosis of lumbar region with neurogenic claudication M48.062    Mixed hyperlipidemia E78.2    Stage 4 chronic kidney disease ([facility] [address] N18.4    Type 2 diabetes mellitus with kidney complication, with long-term current use of insulin (HCC) E11.29, Z79.4    Syncope and collapse R55    Obesity, Class II, BMI 35-39.9 E66.9    Thyroid nodule greater than or equal to 1 cm in diameter incidentally noted on imaging study E04.1    Essential hypertension I10    Anemia in stage 4 chronic kidney disease (HCC) N18.4, D63.1    Chronic ischemic heart disease I25.9    Acute cerebrovascular accident (CVA) ([facility] [address] I63.9       Pain: 8/10    Cognitive Treatment    Treatment time: 0938-6524      Subjective: [x] Alert [x] Cooperative     [] Confused     [] Agitated    [] Lethargic      Objective/Assessment:  Attention: Functional throughout    Recall: paragraph recall- 100%. ST educ. Pt. Re: compensatory recall strategies. Problem Solving/Reasoning: Deductive reasoning  Puzzle x2 - min to mod A      Other:     Plan:  [x] Continue ST services    [] Discharge from ST:      Discharge recommendations: [] Inpatient Rehab   [] [place]   [] Outpatient Therapy  [] Follow up at trauma clinic   [] Other:       Treatment completed by: Asa Ruano A.CCC/SLP

## 2021-08-14 NOTE — PROGRESS NOTES
[address]    ACUTE REHABILITATION OCCUPATIONAL THERAPY  DAILY NOTE    Date: 21  Patient Name: Celia Lundberg      Room: 8741/8614-80    MRN: [id number]   : 1960  (59 y.o.)  Gender: female   Referring Practitioner: Kolby Siegel MD  Diagnosis: CVA  Additional Pertinent Hx: Presented with acute right arm weakness and mental status change. MRI brain showed probable acute to subacute lacunar infarct of the left frontal lobe. AURELIA on CKD stage 4 likely d/t contrast induced nephropathy. Dialysis was initiated on 21, had 2 treatment session, labs improving. Pt. has history of diabetes mellitus type II, CAD s/p CABG (), cervical stenosis, back pain, lymphedema, diastolic CHF, DVT, urinary retention, and iron deficiency anemia. Pt. admitted to ARU from [place] [address] 21. Restrictions  Restrictions/Precautions: Fall Risk  Required Braces or Orthoses  Right Lower Extremity Brace:  (Lymphedema wrap)  Left Lower Extremity Brace:  (Lymphedema wrap)  Required Braces or Orthoses?: Yes (Lymphedema wraps)    Subjective  Subjective: \"I had to have dialysis yesterday\" \"I slept really good last night\"  Comments: Pt was pleasant and motivated for occupational therapy. Patient Currently in Pain: Yes  Pain Level: 8  Pain Location: Neck  Restrictions/Precautions: Fall Risk  Overall Orientation Status: Within Functional Limits     Pain Assessment  Pain Assessment: 0-10  Pain Level: 8  Pain Type: Chronic pain  Pain Location: Neck  Pain Descriptors: Constant  Pain Frequency: Continuous  Clinical Progression: Not changed    Objective  Cognition  Overall Cognitive Status: Impaired  Following Directions:  Follows two step commands  Attention Span: Appears intact  Memory: Decreased recall of recent events  Sequencing and Organization: Assistance required to generate solutions  Balance  Sitting Balance: Supervision  Standing Balance: Contact guard assistance (CGA-SBA)  Bed mobility  Supine to endurance;Decreased balance;Decreased high-level IADLs;Decreased coordination  Prognosis: Good  Discharge Recommendations: Patient would benefit from continued therapy after discharge;Home with assist PRN  Activity Tolerance: Patient Tolerated treatment well  Safety Devices in place: Yes  Type of devices: Left in chair;Call light within reach; All fall risk precautions in place          Patient Education: safety with transfers, use of AE to increase independence with self care tasks. Patient Goals   Patient goals : \"I would like to have energy and balance and to just be able to walk around and go to the store and walk around. \"   Learner:patient  Method: explanation       Outcome: demonstrated understanding        Plan  Plan  Times per week: 5-7  Times per day: Twice a day  Current Treatment Recommendations: Self-Care / ADL, Strengthening, ROM, Balance Training, Functional Mobility Training, Endurance Training, Neuromuscular Re-education, Cognitive Reorientation, Pain Management, Safety Education & Training, Patient/Caregiver Education & Training, Equipment Evaluation, Education, & procurement, Home Management Training, Cognitive/Perceptual Training  Patient Goals   Patient goals : \"I would like to have energy and balance and to just be able to walk around and go to the store and walk around. \"   Short term goals  Time Frame for Short term goals: By 5-6 days  Short term goal 1: Pt will complete lower body dressing with CGA and good safety  Short term goal 2: Pt will complete functional trasnfers/mobility during self care tasks with supervision and good safety with use of least restrictive device  Short term goal 3: Pt will tolerate standing 5+ minutes during functional activity of choice with good safety   Short term goal 4: Pt will verbalize/demonstrate good understanding of energy conservation techniques to increase safety and independence with self care tasks  Short term goal 5: Pt will participate in 15+ minutes of therapeutic exercises/functional activities to increase safety and independence with self care tasks. Long term goals  Time Frame for Long term goals : By discharge  Long term goal 1: Pt will complete BADLs with modified independence and good safety  Long term goal 2: Pt will complete functional transfers/mobility during self care tasks with modified independence and good safety with use of least restrictive device  Long term goal 3: Pt will tolerate standing 8+ minutes during functional activity of choice with good safety  Long term goal 4: Pt will complete simple meal prep/light house keeping task with modified independence and good safety   Long term goal 5: Pt will verbalize/demonstrate good understanding of home safety/fall prevention to increase safety and independence with self care tasks.    Long term goals 6: Pt will demonstrated increased  strength and coordination during self care tasks as evident by and an improvement on the 9 hole peg test by 3 seconds and increased  strength by 5#     08/14/21 0740   OT Individual Minutes   Time In 0740   Time Out 0838   Minutes 58   Time Code Minutes    Timed Code Treatment Minutes 58 Minutes     Electronically signed by Emeka Jones OT on 8/14/21 at 12:40 PM EDT

## 2021-08-14 NOTE — PROGRESS NOTES
NEPHROLOGY CONSULT     Patient :  Jacinta Ray; 59 y.o. MRN# [id number]  Location:  2617/2617-01  Attending:  Clarisse Salazar MD  Admit Date:  8/11/2021   Hospital Day: 3      Reason for Consult: Acute kidney injury      Chief Complaint: Left-sided weakness, stroke  History Obtained From: Patient    History of Present Illness: This is a 59 y.o. female with past medical history of longstanding type 2 diabetes insulin-dependent diabetes mellitus, essential hypertension, coronary artery disease status post CABG in 2004, coronary artery stent in 2019, CHF with EF of 55%, history of mild to moderate LVH diastolic dysfunction, CKD 4 with baseline creatinine of about 2.2 to 2.4 mg/dL, patient initially presented to [facility] on 8/1/2021 with right-sided weakness, sudden onset patient was diagnosed with acute/subacute stroke in left frontal lobe with right-sided weakness, patient had CT angiogram during that. And developed acute kidney injury due to contrast-induced nephropathy requiring dialysis serum creatinine peaked to 4.9 mg/dL and patient was started on dialysis on 6 August patient had dialysis on 7 August, and dialysis was held follow kidney function renal recovery and subsequently patient was transferred to acute rehab on 10 August.  Nephrology is consulted for ongoing management of  acute kidney injury on CKD. Patient is feeling better weakness slightly improved patient still have peripheral edema patient is on diuretics most recent serum creatinine is 2.9 mg/dL, BUN 57  Urine output is not measured. During hospital stay at [facility] patient did have urinary retention and had Pantoja catheter which was removed 2 days ago. Denies any urinary complaints  Blood pressure has been stable    Medication review showed use of  ARB's and diuretics. Subjective/interval history.   Patient seen and examined, patient denies any new complaints continue to have peripheral edema  Urine output not recorded properly but remains low according to the patient  Patient had dialysis yesterday  Scr 2.6 mg/dl today  Still c/o edema        Past Medical History:        Diagnosis Date    Backache, unspecified     CHF (congestive heart failure) (HCC)     Chronic kidney disease     Coronary atherosclerosis of artery bypass graft     Cramp of limb     Gallstones     Hypertension     Insomnia     Pneumonia     Type II or unspecified type diabetes mellitus with renal manifestations, not stated as uncontrolled(250.40)     Type II or unspecified type diabetes mellitus without mention of complication, not stated as uncontrolled     Unspecified vitamin D deficiency        Past Surgical History:        Procedure Laterality Date    ANKLE FRACTURE SURGERY      BREAST SURGERY      CARPAL TUNNEL RELEASE      CHOLECYSTECTOMY, OPEN N/A     CORONARY ARTERY BYPASS GRAFT      x3    HAND SURGERY      KNEE ARTHROSCOPY      right    TONSILLECTOMY         Current Medications:    sodium citrate 4 % injection 1.3 mL, PRN  sodium citrate 4 % injection 1.3 mL, PRN  traZODone (DESYREL) tablet 100 mg, Nightly  sodium chloride (OCEAN, BABY AYR) 0.65 % nasal spray 1 spray, PRN  acetaminophen (TYLENOL) tablet 650 mg, Q4H PRN  polyethylene glycol (GLYCOLAX) packet 17 g, Daily  senna (SENOKOT) tablet 17.2 mg, Daily PRN  bisacodyl (DULCOLAX) suppository 10 mg, Daily PRN  febuxostat (ULORIC) tablet 40 mg, Daily  tamsulosin (FLOMAX) capsule 0.4 mg, Daily  gabapentin (NEURONTIN) capsule 300 mg, BID  furosemide (LASIX) tablet 80 mg, BID  pantoprazole (PROTONIX) tablet 40 mg, QAM AC  ranolazine (RANEXA) extended release tablet 1,000 mg, BID  insulin lispro (HUMALOG) injection vial 0-12 Units, TID WC  insulin lispro (HUMALOG) injection vial 0-6 Units, Nightly  glucose (GLUTOSE) 40 % oral gel 15 g, PRN  dextrose 50 % IV solution, PRN  glucagon (rDNA) injection 1 mg, PRN  dextrose 5 % solution, PRN  apixaban (ELIQUIS) tablet 5 mg, BID  aspirin chewable tablet 81 mg, Daily  atorvastatin (LIPITOR) tablet 80 mg, Nightly  busPIRone (BUSPAR) tablet 7.5 mg, TID  carvedilol (COREG) tablet 6.25 mg, BID WC  ferrous sulfate (IRON 325) tablet 325 mg, BID WC  insulin glargine (LANTUS) injection vial 48 Units, BID        Allergies:  Adhesive tape, Ace inhibitors, Iv dye [iodides], and Metformin and related        Objective:  CURRENT TEMPERATURE:  Temp: 97.6 °F (36.4 °C)  MAXIMUM TEMPERATURE OVER 24HRS:  Temp (24hrs), Av.7 °F (36.5 °C), Min:97.6 °F (36.4 °C), Max:97.9 °F (36.6 °C)    CURRENT RESPIRATORY RATE:  Resp: 16  CURRENT PULSE:  Pulse: 65  CURRENT BLOOD PRESSURE:  BP: 125/82  24HR BLOOD PRESSURE RANGE:  Systolic (42VAK), YOY:634 , Min:99 , FIX:712   ; Diastolic (36INV), SMF:63, Min:30, Max:82    24HR INTAKE/OUTPUT:      Intake/Output Summary (Last 24 hours) at 2021 1201  Last data filed at 2021 1810  Gross per 24 hour   Intake 500 ml   Output 2500 ml   Net -2000 ml     Patient Vitals for the past 96 hrs (Last 3 readings):   Weight   21 1810 239 lb 3.2 oz (108.5 kg)   21 1428 244 lb 12.8 oz (111 kg)   21 1754 244 lb 12.8 oz (111 kg)       Physical Exam:  GENERAL APPEARANCE: Alert and cooperative, and appears to be in no acute distress. HEAD: normocephalic  EYES: EOMI. Not pale, anicteric   NOSE:  No nasal discharge. THROAT:  Oral cavity and pharynx normal. Moist  CARDIAC: Normal S1 and S2. No S3, S4 or murmurs. Rhythm is regular. LUNGS: Clear to auscultation and percussion without rales, rhonchi, wheezing or diminished breath sounds. BACK: Examination of the spine reveals normal gait and posture, no spinal deformity, symmetry of spinal muscles, without tenderness, decreased range of motion or muscular spasm    MUSKULOSKELETAL: Adequately aligned spine. No joint erythema or tenderness. EXTREMITIES: 2+-3 edema. Peripheral pulses intact.    NEURO: Right-sided weakness      Labs:   CBC:  Recent Labs 08/12/21  0619   WBC 8.6   RBC 3.28*   HGB 10.6*   HCT 31.8*   MCV 96.9   MCH 32.3   MCHC 33.4   RDW 17.0*      MPV 8.4      BMP:   Recent Labs     08/12/21  0619 08/13/21  0712 08/14/21  0648    139 133*   K 3.9 4.3 4.2    100 96*   CO2 25 27 25   BUN 57* 65* 36*   CREATININE 2.98* 3.71* 2.62*   GLUCOSE 215* 144* 158*   CALCIUM 9.4 9.5 8.9      Phosphorus:  No results for input(s): PHOS in the last 72 hours. Magnesium: No results for input(s): MG in the last 72 hours. Albumin: No results for input(s): LABALBU in the last 72 hours. IRON:  No results for input(s): IRON in the last 72 hours. Iron Saturation:  Invalid input(s): PERCENTFE  TIBC:  No results for input(s): TIBC in the last 72 hours. FERRITIN:  No results for input(s): FERRITIN in the last 72 hours. SPEP: No results for input(s): SPEP in the last 72 hours. No results for input(s): PROT, ALBCAL, ALBCAL, ALBPCT, LABALPH, A1PCT, LABALPH, A2PCT, LABBETA, BETAPCT, GAMGLOB, GGPCT, PATH in the last 72 hours. UPEP: No results for input(s): TPU in the last 72 hours. Urine Sodium:  No results for input(s): JASON in the last 72 hours. Urine Potassium: No results for input(s): KUR in the last 72 hours. Urine Chloride: Invalid input(s): CLU  Urine Ph:  Invalid input(s): PO4U  Urine Osmolarity: No results for input(s): OSMOU in the last 72 hours. Urine Creatinine:  No results for input(s): LABCREA in the last 72 hours. Urine Eosinophils: Invalid input(s): EOSU  Urine Protein:  No results for input(s): TPU in the last 72 hours. Urinalysis:  No results for input(s): Shelley Trimble, PHUR, LABCAST, WBCUA, RBCUA, MUCUS, TRICHOMONAS, YEAST, BACTERIA, CLARITYU, SPECGRAV, LEUKOCYTESUR, UROBILINOGEN, BILIRUBINUR, BLOODU, GLUCOSEU, KETUA, AMORPHOUS in the last 72 hours. Radiology:  Reviewed as available.     Assessment:  Patient was admitted to [facility] on 8/1/2021 with acute frontal stroke right-sided weakness, patient developed acute kidney injury due to contrast-induced nephropathy serum creatinine peaked to 4.9 mg/dL patient was started on dialysis patient had dialysis on August 6 and 7, patient was transferred to acute rehab. 1.  AURELIA on CKD secondary to ATN contrast-induced nephropathy started on acute dialysis on August 6 and 7. Serum creatinine today increased to 3.7 mg/dl from 2.9 mg/dl-- started dialysis, had dialysis yesterday 8/13/21    2.   2 diabetes insulin-dependent dependent diabetes mellitus. 3.  Essential hypertension. 4.  Acute CVA. Right-sided weakness with left frontal lobe ischemic stroke         Plan:  No plans for HD today, patient had HD yesterday. Next HD monday  1. Strict I's and O's  2. Continue to monitor kidney function closely and evaluate daily for need for dialysis  3. BMP daily  4. If Patient remains dependent on dialysis, will plan for tunneled HD catheter next week. 4. Continue Lasix 80 mg twice daily        Thank you for the consultation.       Electronically signed by Bg Salcido MD on 8/14/2021 at 12:01 PM

## 2021-08-15 LAB
-: NORMAL
ANION GAP SERPL CALCULATED.3IONS-SCNC: 12 MMOL/L (ref 9–17)
BUN BLDV-MCNC: 45 MG/DL (ref 8–23)
BUN/CREAT BLD: ABNORMAL (ref 9–20)
CALCIUM SERPL-MCNC: 9.3 MG/DL (ref 8.6–10.4)
CHLORIDE BLD-SCNC: 98 MMOL/L (ref 98–107)
CO2: 25 MMOL/L (ref 20–31)
CREAT SERPL-MCNC: 3.05 MG/DL (ref 0.5–0.9)
GFR AFRICAN AMERICAN: 19 ML/MIN
GFR NON-AFRICAN AMERICAN: 15 ML/MIN
GFR SERPL CREATININE-BSD FRML MDRD: ABNORMAL ML/MIN/{1.73_M2}
GFR SERPL CREATININE-BSD FRML MDRD: ABNORMAL ML/MIN/{1.73_M2}
GLUCOSE BLD-MCNC: 136 MG/DL (ref 65–105)
GLUCOSE BLD-MCNC: 156 MG/DL (ref 70–99)
GLUCOSE BLD-MCNC: 234 MG/DL (ref 65–105)
GLUCOSE BLD-MCNC: 248 MG/DL (ref 65–105)
GLUCOSE BLD-MCNC: 274 MG/DL (ref 65–105)
POTASSIUM SERPL-SCNC: 4.6 MMOL/L (ref 3.7–5.3)
REASON FOR REJECTION: NORMAL
SODIUM BLD-SCNC: 135 MMOL/L (ref 135–144)
ZZ NTE CLEAN UP: ORDERED TEST: NORMAL
ZZ NTE WITH NAME CLEAN UP: SPECIMEN SOURCE: NORMAL

## 2021-08-15 PROCEDURE — 97116 GAIT TRAINING THERAPY: CPT

## 2021-08-15 PROCEDURE — 1180000000 HC REHAB R&B

## 2021-08-15 PROCEDURE — 97112 NEUROMUSCULAR REEDUCATION: CPT

## 2021-08-15 PROCEDURE — 97535 SELF CARE MNGMENT TRAINING: CPT

## 2021-08-15 PROCEDURE — 97110 THERAPEUTIC EXERCISES: CPT

## 2021-08-15 PROCEDURE — 97530 THERAPEUTIC ACTIVITIES: CPT

## 2021-08-15 PROCEDURE — 6370000000 HC RX 637 (ALT 250 FOR IP): Performed by: PHYSICAL MEDICINE & REHABILITATION

## 2021-08-15 PROCEDURE — 99232 SBSQ HOSP IP/OBS MODERATE 35: CPT | Performed by: INTERNAL MEDICINE

## 2021-08-15 PROCEDURE — 82947 ASSAY GLUCOSE BLOOD QUANT: CPT

## 2021-08-15 PROCEDURE — 80048 BASIC METABOLIC PNL TOTAL CA: CPT

## 2021-08-15 PROCEDURE — 1200000000 HC SEMI PRIVATE

## 2021-08-15 PROCEDURE — 36415 COLL VENOUS BLD VENIPUNCTURE: CPT

## 2021-08-15 PROCEDURE — 99232 SBSQ HOSP IP/OBS MODERATE 35: CPT | Performed by: PHYSICAL MEDICINE & REHABILITATION

## 2021-08-15 RX ADMIN — CARVEDILOL 6.25 MG: 6.25 TABLET, FILM COATED ORAL at 09:45

## 2021-08-15 RX ADMIN — ACETAMINOPHEN 650 MG: 325 TABLET, FILM COATED ORAL at 04:03

## 2021-08-15 RX ADMIN — ATORVASTATIN CALCIUM 80 MG: 80 TABLET, FILM COATED ORAL at 20:57

## 2021-08-15 RX ADMIN — INSULIN GLARGINE 48 UNITS: 100 INJECTION, SOLUTION SUBCUTANEOUS at 21:25

## 2021-08-15 RX ADMIN — BUSPIRONE HYDROCHLORIDE 7.5 MG: 7.5 TABLET ORAL at 13:30

## 2021-08-15 RX ADMIN — FUROSEMIDE 80 MG: 40 TABLET ORAL at 17:07

## 2021-08-15 RX ADMIN — BUSPIRONE HYDROCHLORIDE 7.5 MG: 7.5 TABLET ORAL at 20:58

## 2021-08-15 RX ADMIN — GABAPENTIN 300 MG: 300 CAPSULE ORAL at 20:57

## 2021-08-15 RX ADMIN — TRAZODONE HYDROCHLORIDE 100 MG: 100 TABLET ORAL at 20:57

## 2021-08-15 RX ADMIN — INSULIN LISPRO 3 UNITS: 100 INJECTION, SOLUTION INTRAVENOUS; SUBCUTANEOUS at 21:25

## 2021-08-15 RX ADMIN — FUROSEMIDE 80 MG: 40 TABLET ORAL at 09:45

## 2021-08-15 RX ADMIN — TAMSULOSIN HYDROCHLORIDE 0.4 MG: 0.4 CAPSULE ORAL at 09:45

## 2021-08-15 RX ADMIN — GABAPENTIN 300 MG: 300 CAPSULE ORAL at 09:45

## 2021-08-15 RX ADMIN — ACETAMINOPHEN 650 MG: 325 TABLET, FILM COATED ORAL at 20:57

## 2021-08-15 RX ADMIN — ACETAMINOPHEN 650 MG: 325 TABLET, FILM COATED ORAL at 09:48

## 2021-08-15 RX ADMIN — INSULIN LISPRO 4 UNITS: 100 INJECTION, SOLUTION INTRAVENOUS; SUBCUTANEOUS at 17:04

## 2021-08-15 RX ADMIN — FERROUS SULFATE TAB 325 MG (65 MG ELEMENTAL FE) 325 MG: 325 (65 FE) TAB at 17:07

## 2021-08-15 RX ADMIN — RANOLAZINE 1000 MG: 1000 TABLET, FILM COATED, EXTENDED RELEASE ORAL at 09:48

## 2021-08-15 RX ADMIN — INSULIN GLARGINE 48 UNITS: 100 INJECTION, SOLUTION SUBCUTANEOUS at 09:46

## 2021-08-15 RX ADMIN — RANOLAZINE 1000 MG: 1000 TABLET, FILM COATED, EXTENDED RELEASE ORAL at 20:59

## 2021-08-15 RX ADMIN — APIXABAN 5 MG: 5 TABLET, FILM COATED ORAL at 20:57

## 2021-08-15 RX ADMIN — FERROUS SULFATE TAB 325 MG (65 MG ELEMENTAL FE) 325 MG: 325 (65 FE) TAB at 09:45

## 2021-08-15 RX ADMIN — BUSPIRONE HYDROCHLORIDE 7.5 MG: 7.5 TABLET ORAL at 09:48

## 2021-08-15 RX ADMIN — PANTOPRAZOLE SODIUM 40 MG: 40 TABLET, DELAYED RELEASE ORAL at 06:02

## 2021-08-15 RX ADMIN — POLYETHYLENE GLYCOL 3350 17 G: 17 POWDER, FOR SOLUTION ORAL at 09:50

## 2021-08-15 RX ADMIN — STANDARDIZED SENNA CONCENTRATE 17.2 MG: 8.6 TABLET ORAL at 20:57

## 2021-08-15 RX ADMIN — CARVEDILOL 6.25 MG: 6.25 TABLET, FILM COATED ORAL at 17:07

## 2021-08-15 RX ADMIN — ASPIRIN 81 MG: 81 TABLET, CHEWABLE ORAL at 09:45

## 2021-08-15 RX ADMIN — INSULIN LISPRO 4 UNITS: 100 INJECTION, SOLUTION INTRAVENOUS; SUBCUTANEOUS at 11:12

## 2021-08-15 RX ADMIN — APIXABAN 5 MG: 5 TABLET, FILM COATED ORAL at 09:45

## 2021-08-15 RX ADMIN — FEBUXOSTAT 40 MG: 40 TABLET, FILM COATED ORAL at 09:48

## 2021-08-15 ASSESSMENT — PAIN SCALES - GENERAL
PAINLEVEL_OUTOF10: 5
PAINLEVEL_OUTOF10: 4
PAINLEVEL_OUTOF10: 5
PAINLEVEL_OUTOF10: 7
PAINLEVEL_OUTOF10: 5
PAINLEVEL_OUTOF10: 3
PAINLEVEL_OUTOF10: 5

## 2021-08-15 ASSESSMENT — PAIN DESCRIPTION - DESCRIPTORS
DESCRIPTORS: SORE
DESCRIPTORS: SORE

## 2021-08-15 ASSESSMENT — PAIN DESCRIPTION - FREQUENCY: FREQUENCY: INTERMITTENT

## 2021-08-15 ASSESSMENT — PAIN DESCRIPTION - PAIN TYPE
TYPE: CHRONIC PAIN
TYPE: CHRONIC PAIN

## 2021-08-15 ASSESSMENT — PAIN DESCRIPTION - LOCATION
LOCATION: BACK;NECK
LOCATION: NECK;BACK

## 2021-08-15 ASSESSMENT — PAIN DESCRIPTION - PROGRESSION: CLINICAL_PROGRESSION: NOT CHANGED

## 2021-08-15 NOTE — PLAN OF CARE
Problem: Falls - Risk of:  Goal: Will remain free from falls  Description: Will remain free from falls  8/15/2021 0642 by Eva Julian LPN  Outcome: Ongoing     Problem: Falls - Risk of:  Goal: Absence of physical injury  Description: Absence of physical injury  8/15/2021 0642 by Eva Julian LPN  Outcome: Ongoing     Problem: Skin Integrity:  Goal: Will show no infection signs and symptoms  Description: Will show no infection signs and symptoms  8/15/2021 0642 by Eva Julian LPN  Outcome: Ongoing     Problem: Skin Integrity:  Goal: Absence of new skin breakdown  Description: Absence of new skin breakdown  8/15/2021 0642 by Eva Julian LPN  Outcome: Ongoing     Problem: Pain:  Goal: Pain level will decrease  Description: Pain level will decrease  8/15/2021 0642 by Eva Julian LPN  Outcome: Ongoing     Problem: Pain:  Goal: Control of chronic pain  Description: Control of chronic pain  8/15/2021 0642 by Eva Julian LPN  Outcome: Ongoing     Problem: Musculor/Skeletal Functional Status  Goal: Highest potential functional level  8/15/2021 0642 by Eva Julian LPN  Outcome: Ongoing     Problem: Musculor/Skeletal Functional Status  Goal: Absence of falls  8/15/2021 0642 by Eva Julian LPN  Outcome: Ongoing     Problem: Nutrition  Goal: Optimal nutrition therapy  8/15/2021 0642 by Eva Julian LPN  Outcome: Ongoing

## 2021-08-15 NOTE — PROGRESS NOTES
[address] [facility]   Acute Rehabilitation Physical Therapy Progress Note    Date: 8/15/21  Patient Name: Lenny Barrera       Room: 5857/5672-35  MRN: [id number]   Account: [de-identified]   : 1960  (59 y.o.) Gender: female     Referring Practitioner: Carlos Sage MD  Diagnosis: CVA  Past Medical History:  has a past medical history of Backache, unspecified, CHF (congestive heart failure) ([address] Chronic kidney disease, Coronary atherosclerosis of artery bypass graft, Cramp of limb, Gallstones, Hypertension, Insomnia, Pneumonia, Type II or unspecified type diabetes mellitus with renal manifestations, not stated as uncontrolled(250.40), Type II or unspecified type diabetes mellitus without mention of complication, not stated as uncontrolled, and Unspecified vitamin D deficiency. Past Surgical History:   has a past surgical history that includes Coronary artery bypass graft; Knee arthroscopy; Carpal tunnel release; Breast surgery; Tonsillectomy; Hand surgery; Ankle fracture surgery; and Cholecystectomy, open (N/A). Additional Pertinent Hx: Presented with acute right arm weakness and mental status change. MRI brain showed probable acute to subacute lacunar infarct of the left frontal lobe. AURELIA on CKD stage 4 likely d/t contrast induced nephropathy. Dialysis was initiated on 21, had 2 treatment session, labs improving. Pt. has history of diabetes mellitus type II, CAD s/p CABG (), cervical stenosis, back pain, lymphedema, diastolic CHF, DVT, urinary retention, and iron deficiency anemia. Pt. admitted to ARU from [place] [id number] 21.      Overall Orientation Status: Within Normal Limits  Restrictions/Precautions  Restrictions/Precautions: Fall Risk  Required Braces or Orthoses?: Yes (Lymphedema wraps)  Required Braces or Orthoses  Right Lower Extremity Brace:  (Lymphedema brace )  Left Lower Extremity Brace:  (Lymphedema brace )    Subjective: Patient very pleasant and motivated for therapy. Complaint of back pain. Comments: Patient complaint of intermittent dizziness (looks like the room is spinning) throughout session. Increased rest breaks required. Vital Signs  Patient Currently in Pain: Yes  Pain Assessment: 0-10  Pain Level: 7  Pain Type: Chronic pain  Pain Location: Back;Neck  Pain Descriptors: Sore  Pain Frequency: Intermittent  Clinical Progression: Not changed  Non-Pharmaceutical Pain Intervention(s): Repositioned;Rest;Ambulation/Increased Activity; Distraction  Response to Pain Intervention: Patient Satisfied        Bed Mobility:   Bed Mobility  Scooting: Modified independent  Bed mobility  Rolling to Left: Modified independent  Rolling to Right: Modified independent  Supine to Sit: Modified independent  Sit to Supine: Modified independent  Scooting: Modified independent    Transfers:  Sit to Stand: Contact guard assistance  Stand to sit: Contact guard assistance  Bed to Chair: Contact guard assistance        Lateral Transfers: Contact guard assistance     Ambulation 1  Surface: level tile  Device: Rolling Walker  Assistance: Contact guard assistance  Quality of Gait: Slightly unsteady, no LOB noted. Decrease step length, increased LLE shakiness. Gait Deviations: Slow Annie;Decreased step height;Decreased step length; Increased ELISABET  Distance: 60ft  Comments: Sudden back pain at end of ambulation  Ambulation 2  Surface - 2: level tile  Device 2: Rolling Walker  Assistance 2: Contact guard assistance  Quality of Gait 2: Slightly unsteady, no LOB noted. Decrease step length, increased LLE shakiness. Gait Deviations: Slow Annie;Decreased step height;Decreased step length; Increased ELISABET  Distance: 200ft  Comments: Sudden back pain at end of ambulation     Stairs/Curb  Stairs?: Yes  Stairs  # Steps : 5  Stairs Height:  (4\" & 6\")  Rails: Bilateral  Device: No Device  Assistance: Contact guard assistance  Comment: PT demos a step-to gait pattern.           BALANCE Posture: Good  Sitting - Static: Fair;+  Sitting - Dynamic: Fair;+  Standing - Static: Fair  Standing - Dynamic: Fair  Comments: Sitting balance assessed EOB, standing balance assessed with rolling walker    EXERCISES Exercises  Comments: Pt's back pain was intolerable when standing on the blue foam for 20 seconds  Other exercises?: Yes  Other exercises 1: Seated (B) LE exercises x20 (2#, lime green theraband)  Other exercises 2: Small hurdles in // bars  Other exercises 4: Standing balance activities- grabbing and passing objects, balloon tapping  Other exercises 5: [employer] balance board \"foot rest\" position: clockwise and counter clockwise circles, 20 reps with each LE  Other exercises 6: Asc/Dsc box steps in // bars: 4 laps  Other exercises 7: Seated BLE ex's AROM. Reps: 10 each. LLE tremors during ex's. Other exercises 8: NuStep L3 x10 minutes; seat @8  Other Activities  Other Activities:  (Toileting, patient urinates and performs stalin care, CGA for balance during stalin care)  Comment: rest breaks PRN. Activity Tolerance: Patient limited by endurance, Patient limited by fatigue, Patient limited by pain  PT Equipment Recommendations  Equipment Needed: No       Current Treatment Recommendations: Strengthening, Balance Training, Transfer Training, Functional Mobility Training, ROM, Endurance Training, Gait Training, Stair training, Pain Management, Home Exercise Program, Safety Education & Training, Neuromuscular Re-education, Patient/Caregiver Education & Training, Equipment Evaluation, Education, & procurement    Conditions Requiring Skilled Therapeutic Intervention  Body structures, Functions, Activity limitations: Decreased functional mobility ; Decreased strength;Decreased endurance;Decreased balance; Increased pain  Assessment: Pt. presents after a CVA with decreased strength and balance, limiting functional mobility. Pt. with history of diabetic neuropathy and lymphedema.  Pt. SBA for bed mobility and CGA for transfers using a rolling walker. Pt. ambulated 45ft. in 45s with CGA using a rolling walker, then 100ft. in 1:50 minutes with CGA using a rolling walker, then 148ft. in 2 minutes using a rollator. Pt. limited by upper and lower back pain and decreased endurance. Pt. displayed shortness of breath upon exertion. Treatment Diagnosis: CVA  Prognosis: Good  Decision Making: Medium Complexity  Exam: ROM, MMT,fucntional mobility , PASS score  Clinical Presentation: Pt. up in bed upon arrival. R internal jugular triple lumen catheter in for dialysis. Dialysis currently on hold due to improved kidney labs. REQUIRES PT FOLLOW UP: Yes  Treatment Initiated : Bed mobility training, Transfer training, Gait training  Discharge Recommendations: Patient would benefit from continued therapy after discharge;Home with assist PRN    Goals  Short term goals  Time Frame for Short term goals: 5 days   Short term goal 1: Pt. to perform bed mobility independently. Short term goal 2: Pt. to tolerate 30 to 45 minutes of therapeutic exercise to improve strength and endurance for increased functional mobility. Short term goal 3: Pt. to improve seated static and dynamic balance to good. Short term goal 4: Pt. to improve standing static balance to fair+ and standing dynamic balance to fair. Short term goal 5: Pt. to ambulate 200ft. with supervision using a rollator. Short term goal 6: Pt. to ascend/descend 3 steps with CGA using one railing  Long term goals  Time Frame for Long term goals : By DC  Long term goal 1: Pt. to perform all transfers independently or with Mod-I. Long term goal 2: Pt. to improve standing static and dynamic balance to good. Long term goal 3: Pt. to ambulate 100ft. on an unlevel/ inclined surface with Mod-I using a rollator. Long term goal 4: Pt. to ascend/descend one flight of stairs with supervision   Long term goal 5: Pt. to ambulate 200ft. in 2 minutes with Mod-I using a rollator.     Long term goal 6: Pt. to improve PASS score from 24/36 to 35/36.        08/15/21 0836 08/15/21 1434   PT Individual Minutes   Time In 3506 0247   Time Out 0940 1523   Minutes 64 49       Electronically signed by Luis Houser PTA on 8/15/21 at 4:05 PM EDT

## 2021-08-15 NOTE — FLOWSHEET NOTE
08/15/21 1523   Encounter Summary   Services provided to: Patient   Referral/Consult From: [facility]   Continue Visiting   (8/15/21)   Complexity of Encounter Low   Length of Encounter 15 minutes   Spiritual/Baptism   Type Spiritual support   Intervention Anointing   Sacraments   Sacrament of Sick-Anointing Anointed  (Jaspreet Race 8/15/21)

## 2021-08-15 NOTE — PROGRESS NOTES
[facility]: [facility]   ACUTE REHABILITATION OCCUPATIONAL THERAPY  DAILY NOTE    Date: 8/15/21  Patient Name: John Parr      Room: 2312/2690-55    MRN: [id number]   : 1960  (59 y.o.)  Gender: female   Referring Practitioner: Shu Grove MD  Diagnosis: CVA  Additional Pertinent Hx: Presented with acute right arm weakness and mental status change. MRI brain showed probable acute to subacute lacunar infarct of the left frontal lobe. AURELIA on CKD stage 4 likely d/t contrast induced nephropathy. Dialysis was initiated on 21, had 2 treatment session, labs improving. Pt. has history of diabetes mellitus type II, CAD s/p CABG (), cervical stenosis, back pain, lymphedema, diastolic CHF, DVT, urinary retention, and iron deficiency anemia. Pt. admitted to ARU from [place] [id number] 21. Restrictions  Restrictions/Precautions: Fall Risk  Required Braces or Orthoses  Right Lower Extremity Brace:  (Lymphedema brace )  Left Lower Extremity Brace:  (Lymphedema brace )  Required Braces or Orthoses?: Yes (Lymphedema wraps)    Subjective  Subjective: \"I was washing my face with my eyes closed and I just got really dizzy\"  Comments: Pt was pleasant and motivated for occupational therapy. Pt reported increased dizziness during self care tasks whick resolved quickly. Patient Currently in Pain: Yes  Pain Level: 5  Pain Location: Neck;Back  Restrictions/Precautions: Fall Risk  Overall Orientation Status: Within Functional Limits     Pain Assessment  Pain Assessment: 0-10  Pain Level: 5  Pain Type: Chronic pain  Pain Location: Neck, Back  Pain Descriptors: Sore  Pain Frequency: Continuous  Clinical Progression: Not changed    Objective  Cognition  Overall Cognitive Status: Impaired  Following Directions:  Follows two step commands  Attention Span: Appears intact  Memory: Decreased recall of recent events  Sequencing and Organization: Assistance required to generate solutions  Balance  Standing Balance: Contact guard assistance (CGA-SBA)  Transfers  Sit to stand: Stand by assistance  Stand to sit: Stand by assistance  Transfer Comments: Verbal cues for hand placement and safety  Standing Balance  Time: 1-2 minutes x 3  Activity: functional transfers, lower body dressing, laundry task  Comment: with RW        Type of ROM/Therapeutic Exercise  Type of ROM/Therapeutic Exercise: Free weights  Comment: Pt completed BUE exercises with use of 2# free weight for 10-15 reps. Pt required rest breaks as needed due to fatige. Pt reports decreased strength in RUE compared to LUE when completing task. Pt completed activity to increase strength and overall endurance to increase safety and independence with ADL and IADL tasks. Exercises  Scapular Protraction: x  Scapular Retraction: x  Shoulder Depression: x  Shoulder Elevation: x  Shoulder Flexion: x  Shoulder Extension: x  Horizontal ABduction: x  Horizontal ADduction: x  Elbow Flexion: x  Elbow Extension: x  Supination: x  Pronation: x  Wrist Flexion: x  Wrist Extension: x     Additional Activities: AM: OT facilitated pts engagement in FM activity of playing cards. Pt able to draw cards, place cards, and shuffle cards with increased time to complete. Pt complete task to increase [facility] and bilateral intergration to increase independence with ADL and IADL tasks. Light Housekeeping  Light Housekeeping Level of Assistance: Contact guard assistance  Light Housekeeping: AM: Pt completed laundry task of standing at the washer, placing dirty clothing into the washer, pouring  into the washer, placing detergent pod, and setting washer to run. Pt required 1 sitting rest break to complete due to fatigue and increased back pain. PM: pt retrieved clothing from dryer while sitting in wheelchair with use of reacher as needed. Pt reports sitting to gather clothing from dryer at home.  Pt completed folding laundry task with use of 1# wrist weight and increased time and rest breasks as needed. Pt educated on pursed lip breathing throuhgout activities to assist with dizziness. ADL  Equipment Provided: Reacher;Sock aid  Grooming: Modified independent   UE Bathing: Stand by assistance  LE Bathing: None  UE Dressing: Setup  LE Dressing: Minimal assistance  Toileting: None  Additional Comments: OT facilitated pt in self care tasks cleaning up sitting in wheelchair at sink side. Pt required assistance with threading bilateral compression socks over feet with pt able to pull socks up the rest of the way. Pt requires assistance with threading lymphedema wraps into BLE and placing the lowest strap with pt able to complete the rest. Pt demonstrated increased dizziness during activity. Pt was educated on breathing during task to assist with dizziness. Pt utilized sock aid to don  socks. Instrumental ADL's  Instrumental ADLs: Yes  Light Housekeeping  Light Housekeeping Level of Assistance: Contact guard assistance  Light Housekeeping: AM: Pt completed laundry task of standing at the washer, placing dirty clothing into the washer, pouring  into the washer, placing detergent pod, and setting washer to run. Pt required 1 sitting rest break to complete due to fatigue and increased back pain. PM: pt retrieved clothing from dryer while sitting in wheelchair with use of reacher as needed. Pt reports sitting to gather clothing from dryer at home. Pt completed folding laundry task with use of 1# wrist weight and increased time and rest breasks as needed. Pt educated on pursed lip breathing throuhgout activities to assist with dizziness. Assessment  Performance deficits / Impairments: Decreased ADL status; Decreased functional mobility ; Decreased strength;Decreased safe awareness;Decreased cognition;Decreased endurance;Decreased balance;Decreased high-level IADLs;Decreased coordination  Prognosis: Good  Discharge Recommendations: Patient would benefit from continued therapy after discharge;Home with assist PRN  Activity Tolerance: Patient Tolerated treatment well  Safety Devices in place: Yes  Type of devices: Left in chair;Call light within reach; All fall risk precautions in place          Patient Education: Safety with transfers, use of reacher to increase safety with IADL tasks, BUE exercises  Patient Goals   Patient goals : \"I would like to have energy and balance and to just be able to walk around and go to the store and walk around. \"   Learner:patient  Method: explanation       Outcome: demonstrated understanding        Plan  Plan  Times per week: 5-7  Times per day: Twice a day  Current Treatment Recommendations: Self-Care / ADL, Strengthening, ROM, Balance Training, Functional Mobility Training, Endurance Training, Neuromuscular Re-education, Cognitive Reorientation, Pain Management, Safety Education & Training, Patient/Caregiver Education & Training, Equipment Evaluation, Education, & procurement, Home Management Training, Cognitive/Perceptual Training  Patient Goals   Patient goals : \"I would like to have energy and balance and to just be able to walk around and go to the store and walk around. \"   Short term goals  Time Frame for Short term goals: By 5-6 days  Short term goal 1: Pt will complete lower body dressing with CGA and good safety  Short term goal 2: Pt will complete functional trasnfers/mobility during self care tasks with supervision and good safety with use of least restrictive device  Short term goal 3: Pt will tolerate standing 5+ minutes during functional activity of choice with good safety   Short term goal 4: Pt will verbalize/demonstrate good understanding of energy conservation techniques to increase safety and independence with self care tasks  Short term goal 5: Pt will participate in 15+ minutes of therapeutic exercises/functional activities to increase safety and independence with self care tasks. Long term goals  Time Frame for Long term goals :  By discharge  Long term goal 1: Pt will complete BADLs with modified independence and good safety  Long term goal 2: Pt will complete functional transfers/mobility during self care tasks with modified independence and good safety with use of least restrictive device  Long term goal 3: Pt will tolerate standing 8+ minutes during functional activity of choice with good safety  Long term goal 4: Pt will complete simple meal prep/light house keeping task with modified independence and good safety   Long term goal 5: Pt will verbalize/demonstrate good understanding of home safety/fall prevention to increase safety and independence with self care tasks.    Long term goals 6: Pt will demonstrated increased  strength and coordination during self care tasks as evident by and an improvement on the 9 hole peg test by 3 seconds and increased  strength by 5#     08/15/21 0736 08/15/21 1336   OT Individual Minutes   Time In 1013 [facility]   Time Out 0834 1411   Minutes 58 35   Time Code Minutes    Timed Code Treatment Minutes 58 Minutes 35 Minutes     Electronically signed by Yoselin Meza OT on 8/15/21 at 3:42 PM EDT

## 2021-08-15 NOTE — PLAN OF CARE
Problem: Falls - Risk of:  Goal: Will remain free from falls  Description: Will remain free from falls  8/15/2021 1559 by Luis Jasso RN  Outcome: Ongoing  Note: The patient remained free from falls this shift, call light within reach, bed in locked and lowest position. Side rails up x2. Continue to monitor closely. Problem: Skin Integrity:  Goal: Absence of new skin breakdown  Description: Absence of new skin breakdown  8/15/2021 1559 by Luis Jasso RN  Outcome: Ongoing  Note: Skin assessment complete. Pt turned and repositioned per self. Area kept free from moisture. Proper nourishment and fluids encouraged, as appropriate. Skin remains clean, dry, and intact. Will continue to monitor for additional needs and changes in skin breakdown.

## 2021-08-15 NOTE — PROGRESS NOTES
Physical Medicine & Rehabilitation  Progress Note      Subjective:      59year-old female with ischemic CVA. Patient is reporting fatigue today but does note some improvement in balance activities with therapy. No new issues with sleep, appetite, bowel, or bladder. ROS:  Denies fevers, chills, sweats. No chest pain, palpitations, lightheadedness. Denies coughing, wheezing or shortness of breath. Denies abdominal pain, nausea, diarrhea or constipation. No new areas of joint pain. Denies new areas of numbness or weakness. Denies new anxiety or depression issues. No new skin problems. Rehabilitation:   Progressing in therapies. PT:  Restrictions/Precautions: Fall Risk  Required Braces or Orthoses  Right Lower Extremity Brace:  (Lymphedema brace )  Left Lower Extremity Brace:  (Lymphedema brace )   Transfers  Sit to Stand: Stand by assistance, Contact guard assistance  Stand to sit: Stand by assistance, Contact guard assistance  Bed to Chair: Stand by assistance  Stand Pivot Transfers: Stand by assistance  Lateral Transfers: Contact guard assistance  Comment: Transfers assessed with rolling walker. Steady, no LOB noted. Ambulation 1  Surface: level tile  Device: Rolling Walker  Assistance: Contact guard assistance  Quality of Gait: Slightly unsteady, no LOB noted. Decrease step length, increase LLE shakiness. Gait Deviations: Slow Annie, Decreased step height, Decreased step length  Distance: 75 ft  Comments: Patient more fatigued this date. Transfers  Sit to Stand: Stand by assistance, Contact guard assistance  Stand to sit: Stand by assistance, Contact guard assistance  Bed to Chair: Stand by assistance  Stand Pivot Transfers: Stand by assistance  Lateral Transfers: Contact guard assistance  Comment: Transfers assessed with rolling walker. Steady, no LOB noted.    Ambulation  Ambulation?: Yes  Ambulation 1  Surface: level tile  Device: Rolling Walker  Assistance: Contact guard assistance  Quality of Gait: Slightly unsteady, no LOB noted. Decrease step length, increase LLE shakiness. Gait Deviations: Slow Annie, Decreased step height, Decreased step length  Distance: 75 ft  Comments: Patient more fatigued this date. Surface: level tile  Ambulation 1  Surface: level tile  Device: Rolling Walker  Assistance: Contact guard assistance  Quality of Gait: Slightly unsteady, no LOB noted. Decrease step length, increase LLE shakiness. Gait Deviations: Slow Annie, Decreased step height, Decreased step length  Distance: 75 ft  Comments: Patient more fatigued this date. OT:  ADL  Equipment Provided: Reacher  Feeding: Modified independent  (per pt report)  Grooming: Modified independent   UE Bathing: Stand by assistance  LE Bathing: Minimal assistance (A for bottom while standing )  UE Dressing: Setup  LE Dressing: Minimal assistance (A with socks and lymphedema wraps)  Toileting: Minimal assistance (A for hygiene)  Additional Comments: OT facilitated pts engagement in showering on this date utilizing grab bars, hand held shower head, and tub bench. Dialysis port covered during shower. Pt requiring assistance with bottom hygiene while standing in shower. Pt utilized reacher to assist with lower body dressing. Pt required assistance with socks and lymphedema wraps at this time due to increased dizziness during bending fowards.           Balance  Sitting Balance: Supervision  Standing Balance: Contact guard assistance (CGA-SBA)   Standing Balance  Time: 1-2 miuntes x 5  Activity: Functional transfers, functional mobility, lower body dressing, lower body bathing  Comment: with RW  Functional Mobility  Functional - Mobility Device: Rolling Walker  Activity: To/from bathroom  Assist Level: Contact guard assistance  Functional Mobility Comments: Verbal cues for hand placement and safety     Bed mobility  Bridging: Stand by assistance  Rolling to Left: Stand by assistance  Rolling to Right: Stand by assistance  Supine to Sit: Stand by assistance  Sit to Supine: Stand by assistance  Scooting: Stand by assistance  Comment: HOB elevated  Transfers  Sit to stand: Contact guard assistance  Stand to sit: Contact guard assistance  Transfer Comments: Verbal cues for hand placement and safety   Toilet Transfers  Toilet - Technique: Ambulating  Equipment Used: Raised toilet seat with rails  Toilet Transfer: Contact guard assistance  Toilet Transfers Comments: Increased time with verbal cues for hand placement and safety     Shower Transfers  Shower - Transfer To: Transfer tub bench  Shower - Technique: Ambulating  Shower Transfers: Contact Guard  Shower Transfers Comments: Increased time with verbal cues for safety over threshold. SPEECH:    Objective:  /62   Pulse 70   Temp 97.9 °F (36.6 °C)   Resp 16   Ht 5' 5\" (1.651 m)   Wt 239 lb 3.2 oz (108.5 kg)   SpO2 93%   BMI 39.80 kg/m²       GEN: Well developed, well nourished, in NAD  HEENT:  NCAT. PERRL. EOMI. Mucous membranes pink and moist. R IJ triple lumen in place. PULM:  Clear to ausculation. No rales or rhonchi. Respirations WNL and unlabored. CV:  bradycardic rate regular rhythm. No murmurs or gallops. GI:  Abdomen soft. Nontender. Non-distended. BS + and equal.    NEUROLOGICAL: A&O x3. Sensation intact to light touch. MSK:  Functional ROM all extremities. Motor testing 5/5 key muscles LUE and LLE. 4+/5 key muscles RUE and RLE. Benedetta Ou SKIN: Warm dry and intact. Good turgor. Scattered ecchymosis BUEs. EXTREMITIES:  No calf tenderness to palpation. Chronic stable BLE lymphedema. PSYCH: Mood WNL. Appropriately interactive. Affect WNL. Diagnostics:     CBC:   No results for input(s): WBC, RBC, HGB, HCT, MCV, RDW, PLT in the last 72 hours.   BMP:   Recent Labs     08/13/21  0712 08/14/21  0648 08/15/21  0737    133* 135   K 4.3 4.2 4.6    96* 98   CO2 27 25 25   BUN 65* 36* 45*   CREATININE 3.71* 2.62* 3.05*   GLUCOSE 144* 158* 156*     BNP: No results for input(s): BNP in the last 72 hours. PT/INR: No results for input(s): PROTIME, INR in the last 72 hours. APTT: No results for input(s): APTT in the last 72 hours. CARDIAC ENZYMES: No results for input(s): CKMB, CKMBINDEX, TROPONINT in the last 72 hours. Invalid input(s): CKTOTAL;3  FASTING LIPID PANEL:  Lab Results   Component Value Date    CHOL 105 04/09/2015    HDL 43 04/09/2015    TRIG 168 04/09/2015     LIVER PROFILE: No results for input(s): AST, ALT, ALB, BILIDIR, BILITOT, ALKPHOS in the last 72 hours.      Current Medications:   Current Facility-Administered Medications: sodium citrate 4 % injection 1.3 mL, 1.3 mL, Intracatheter, PRN  sodium citrate 4 % injection 1.3 mL, 1.3 mL, Intracatheter, PRN  traZODone (DESYREL) tablet 100 mg, 100 mg, Oral, Nightly  sodium chloride (OCEAN, BABY AYR) 0.65 % nasal spray 1 spray, 1 spray, Each Nostril, PRN  acetaminophen (TYLENOL) tablet 650 mg, 650 mg, Oral, Q4H PRN  polyethylene glycol (GLYCOLAX) packet 17 g, 17 g, Oral, Daily  senna (SENOKOT) tablet 17.2 mg, 2 tablet, Oral, Daily PRN  bisacodyl (DULCOLAX) suppository 10 mg, 10 mg, Rectal, Daily PRN  febuxostat (ULORIC) tablet 40 mg, 40 mg, Oral, Daily  tamsulosin (FLOMAX) capsule 0.4 mg, 0.4 mg, Oral, Daily  gabapentin (NEURONTIN) capsule 300 mg, 300 mg, Oral, BID  furosemide (LASIX) tablet 80 mg, 80 mg, Oral, BID  pantoprazole (PROTONIX) tablet 40 mg, 40 mg, Oral, QAM AC  ranolazine (RANEXA) extended release tablet 1,000 mg, 1,000 mg, Oral, BID  insulin lispro (HUMALOG) injection vial 0-12 Units, 0-12 Units, Subcutaneous, TID WC  insulin lispro (HUMALOG) injection vial 0-6 Units, 0-6 Units, Subcutaneous, Nightly  glucose (GLUTOSE) 40 % oral gel 15 g, 15 g, Oral, PRN  dextrose 50 % IV solution, 12.5 g, Intravenous, PRN  glucagon (rDNA) injection 1 mg, 1 mg, Intramuscular, PRN  dextrose 5 % solution, 100 mL/hr, Intravenous, PRN  apixaban (ELIQUIS) tablet 5 mg, 5 mg, Oral, BID  aspirin chewable tablet 81 mg, 81 mg, Oral, Daily  atorvastatin (LIPITOR) tablet 80 mg, 80 mg, Oral, Nightly  busPIRone (BUSPAR) tablet 7.5 mg, 7.5 mg, Oral, TID  carvedilol (COREG) tablet 6.25 mg, 6.25 mg, Oral, BID WC  ferrous sulfate (IRON 325) tablet 325 mg, 325 mg, Oral, BID WC  insulin glargine (LANTUS) injection vial 48 Units, 48 Units, Subcutaneous, BID      Impression/Plan:   Impaired ADLs, gait, and mobility due to:      1. Ischemic CVA L frontal lobe with mild R dominant hemiplegia:  PT/OT for gait, mobility, strengthening, endurance, ADLs, and self care. On ASA, atorvastatin  2. HTN/CAD/Angina/Diastolic CHF: on carvedilol, furosemide, Ranexa  3. Insomnia: on Trazodone - was taking 75 mg at home - increased to 100 mg. Sleep improved. 4. Anxiety: on buspirone  5. DM with neuropathy: on lantus, sliding scale, gabapentin  6. AURELIA on CKD IV: Initiated on HD at [place] [id number]. Nephrology following - continuing lasix at 80 mg BID. For HD MWF. Anticipate possible tunnel cath placement this week. 7. Anemia of chronic disease: on ferrous sulfate. Had IV iron at [facility]. Hb low but stable. Monitoring weekly  8. Urine retention: on flomax  9. Bowel Management: Miralax daily, senokot prn, dulcolax prn. Noting some constipation secondary to iron. 10. DVT Prophylaxis:  SCD's while in bed, AGUSTIN's and Eliquis  11. Internal Medicine for medical management    Electronically signed by Jorge Mcguire MD on 8/15/2021 at 11:12 AM      This note is created with the assistance of a speech recognition program.  While intending to generate a document that actually reflects the content of the visit, the document can still have some errors including those of syntax and sound a like substitutions which may escape proof reading. In such instances, actual meaning can be extrapolated by contextual diversion.

## 2021-08-15 NOTE — FLOWSHEET NOTE
08/15/21 1544   Encounter Summary   Services provided to: Patient   Referral/Consult From: [facility]   (8/15/21V)   Volunteer Visit Yes   Complexity of Encounter Low   Length of Encounter 15 minutes   Spiritual/Adventist   Type Spiritual support   Intervention Communion;Prayer   Sacraments   Communion Patient received communion

## 2021-08-16 LAB
ANION GAP SERPL CALCULATED.3IONS-SCNC: 15 MMOL/L (ref 9–17)
BUN BLDV-MCNC: 57 MG/DL (ref 8–23)
BUN/CREAT BLD: ABNORMAL (ref 9–20)
CALCIUM SERPL-MCNC: 9.1 MG/DL (ref 8.6–10.4)
CHLORIDE BLD-SCNC: 99 MMOL/L (ref 98–107)
CO2: 23 MMOL/L (ref 20–31)
CREAT SERPL-MCNC: 3.58 MG/DL (ref 0.5–0.9)
GFR AFRICAN AMERICAN: 16 ML/MIN
GFR NON-AFRICAN AMERICAN: 13 ML/MIN
GFR SERPL CREATININE-BSD FRML MDRD: ABNORMAL ML/MIN/{1.73_M2}
GFR SERPL CREATININE-BSD FRML MDRD: ABNORMAL ML/MIN/{1.73_M2}
GLUCOSE BLD-MCNC: 169 MG/DL (ref 65–105)
GLUCOSE BLD-MCNC: 172 MG/DL (ref 70–99)
GLUCOSE BLD-MCNC: 204 MG/DL (ref 65–105)
GLUCOSE BLD-MCNC: 281 MG/DL (ref 65–105)
GLUCOSE BLD-MCNC: 287 MG/DL (ref 65–105)
INR BLD: 1.4
PARTIAL THROMBOPLASTIN TIME: 42.4 SEC (ref 24–36)
POTASSIUM SERPL-SCNC: 4.2 MMOL/L (ref 3.7–5.3)
PROTHROMBIN TIME: 17.1 SEC (ref 11.8–14.6)
SODIUM BLD-SCNC: 137 MMOL/L (ref 135–144)

## 2021-08-16 PROCEDURE — 99232 SBSQ HOSP IP/OBS MODERATE 35: CPT | Performed by: INTERNAL MEDICINE

## 2021-08-16 PROCEDURE — 97129 THER IVNTJ 1ST 15 MIN: CPT

## 2021-08-16 PROCEDURE — 97110 THERAPEUTIC EXERCISES: CPT

## 2021-08-16 PROCEDURE — 82947 ASSAY GLUCOSE BLOOD QUANT: CPT

## 2021-08-16 PROCEDURE — 97116 GAIT TRAINING THERAPY: CPT

## 2021-08-16 PROCEDURE — 99232 SBSQ HOSP IP/OBS MODERATE 35: CPT | Performed by: PHYSICAL MEDICINE & REHABILITATION

## 2021-08-16 PROCEDURE — 97130 THER IVNTJ EA ADDL 15 MIN: CPT

## 2021-08-16 PROCEDURE — 90935 HEMODIALYSIS ONE EVALUATION: CPT

## 2021-08-16 PROCEDURE — 97530 THERAPEUTIC ACTIVITIES: CPT

## 2021-08-16 PROCEDURE — 85610 PROTHROMBIN TIME: CPT

## 2021-08-16 PROCEDURE — 6370000000 HC RX 637 (ALT 250 FOR IP): Performed by: INTERNAL MEDICINE

## 2021-08-16 PROCEDURE — 36415 COLL VENOUS BLD VENIPUNCTURE: CPT

## 2021-08-16 PROCEDURE — 80048 BASIC METABOLIC PNL TOTAL CA: CPT

## 2021-08-16 PROCEDURE — 2500000003 HC RX 250 WO HCPCS: Performed by: INTERNAL MEDICINE

## 2021-08-16 PROCEDURE — 85730 THROMBOPLASTIN TIME PARTIAL: CPT

## 2021-08-16 PROCEDURE — 6370000000 HC RX 637 (ALT 250 FOR IP): Performed by: PHYSICAL MEDICINE & REHABILITATION

## 2021-08-16 PROCEDURE — 97535 SELF CARE MNGMENT TRAINING: CPT

## 2021-08-16 PROCEDURE — 1180000000 HC REHAB R&B

## 2021-08-16 RX ORDER — INSULIN GLARGINE 100 [IU]/ML
53 INJECTION, SOLUTION SUBCUTANEOUS 2 TIMES DAILY
Status: DISCONTINUED | OUTPATIENT
Start: 2021-08-16 | End: 2021-08-26 | Stop reason: HOSPADM

## 2021-08-16 RX ADMIN — GABAPENTIN 300 MG: 300 CAPSULE ORAL at 21:15

## 2021-08-16 RX ADMIN — RANOLAZINE 1000 MG: 1000 TABLET, FILM COATED, EXTENDED RELEASE ORAL at 10:29

## 2021-08-16 RX ADMIN — ATORVASTATIN CALCIUM 80 MG: 80 TABLET, FILM COATED ORAL at 21:14

## 2021-08-16 RX ADMIN — ASPIRIN 81 MG: 81 TABLET, CHEWABLE ORAL at 10:29

## 2021-08-16 RX ADMIN — BUSPIRONE HYDROCHLORIDE 7.5 MG: 7.5 TABLET ORAL at 21:15

## 2021-08-16 RX ADMIN — INSULIN LISPRO 4 UNITS: 100 INJECTION, SOLUTION INTRAVENOUS; SUBCUTANEOUS at 16:48

## 2021-08-16 RX ADMIN — TAMSULOSIN HYDROCHLORIDE 0.4 MG: 0.4 CAPSULE ORAL at 10:28

## 2021-08-16 RX ADMIN — CARVEDILOL 6.25 MG: 6.25 TABLET, FILM COATED ORAL at 10:29

## 2021-08-16 RX ADMIN — CARVEDILOL 6.25 MG: 6.25 TABLET, FILM COATED ORAL at 16:48

## 2021-08-16 RX ADMIN — INSULIN GLARGINE 48 UNITS: 100 INJECTION, SOLUTION SUBCUTANEOUS at 09:00

## 2021-08-16 RX ADMIN — GABAPENTIN 300 MG: 300 CAPSULE ORAL at 10:29

## 2021-08-16 RX ADMIN — FUROSEMIDE 80 MG: 40 TABLET ORAL at 10:28

## 2021-08-16 RX ADMIN — FEBUXOSTAT 40 MG: 40 TABLET, FILM COATED ORAL at 10:35

## 2021-08-16 RX ADMIN — INSULIN GLARGINE 53 UNITS: 100 INJECTION, SOLUTION SUBCUTANEOUS at 21:16

## 2021-08-16 RX ADMIN — PANTOPRAZOLE SODIUM 40 MG: 40 TABLET, DELAYED RELEASE ORAL at 07:23

## 2021-08-16 RX ADMIN — RANOLAZINE 1000 MG: 1000 TABLET, FILM COATED, EXTENDED RELEASE ORAL at 21:14

## 2021-08-16 RX ADMIN — APIXABAN 5 MG: 5 TABLET, FILM COATED ORAL at 21:15

## 2021-08-16 RX ADMIN — INSULIN LISPRO 6 UNITS: 100 INJECTION, SOLUTION INTRAVENOUS; SUBCUTANEOUS at 11:22

## 2021-08-16 RX ADMIN — Medication 1.3 ML: at 14:56

## 2021-08-16 RX ADMIN — BUSPIRONE HYDROCHLORIDE 7.5 MG: 7.5 TABLET ORAL at 13:49

## 2021-08-16 RX ADMIN — FERROUS SULFATE TAB 325 MG (65 MG ELEMENTAL FE) 325 MG: 325 (65 FE) TAB at 16:48

## 2021-08-16 RX ADMIN — POLYETHYLENE GLYCOL 3350 17 G: 17 POWDER, FOR SOLUTION ORAL at 10:27

## 2021-08-16 RX ADMIN — BUSPIRONE HYDROCHLORIDE 7.5 MG: 7.5 TABLET ORAL at 10:28

## 2021-08-16 RX ADMIN — ACETAMINOPHEN 650 MG: 325 TABLET, FILM COATED ORAL at 10:28

## 2021-08-16 RX ADMIN — FERROUS SULFATE TAB 325 MG (65 MG ELEMENTAL FE) 325 MG: 325 (65 FE) TAB at 10:28

## 2021-08-16 RX ADMIN — INSULIN LISPRO 3 UNITS: 100 INJECTION, SOLUTION INTRAVENOUS; SUBCUTANEOUS at 21:16

## 2021-08-16 RX ADMIN — APIXABAN 5 MG: 5 TABLET, FILM COATED ORAL at 10:28

## 2021-08-16 RX ADMIN — FUROSEMIDE 80 MG: 40 TABLET ORAL at 16:48

## 2021-08-16 RX ADMIN — TRAZODONE HYDROCHLORIDE 100 MG: 100 TABLET ORAL at 21:15

## 2021-08-16 ASSESSMENT — PAIN SCALES - GENERAL
PAINLEVEL_OUTOF10: 8
PAINLEVEL_OUTOF10: 3
PAINLEVEL_OUTOF10: 4
PAINLEVEL_OUTOF10: 6
PAINLEVEL_OUTOF10: 0
PAINLEVEL_OUTOF10: 4
PAINLEVEL_OUTOF10: 2
PAINLEVEL_OUTOF10: 0

## 2021-08-16 ASSESSMENT — PAIN DESCRIPTION - PAIN TYPE
TYPE: CHRONIC PAIN

## 2021-08-16 ASSESSMENT — PAIN DESCRIPTION - LOCATION
LOCATION: BACK

## 2021-08-16 ASSESSMENT — PAIN DESCRIPTION - ORIENTATION
ORIENTATION: LOWER
ORIENTATION: LOWER
ORIENTATION: UPPER;LOWER

## 2021-08-16 NOTE — PROGRESS NOTES
Hospital patient did have urinary retention and had Pantoja catheter which was removed 2 days ago. Denies any urinary complaints. Medication review showed use of  ARB's and diuretics.     Current Medications:    sodium citrate 4 % injection 1.3 mL, PRN  sodium citrate 4 % injection 1.3 mL, PRN  traZODone (DESYREL) tablet 100 mg, Nightly  sodium chloride (OCEAN, BABY AYR) 0.65 % nasal spray 1 spray, PRN  acetaminophen (TYLENOL) tablet 650 mg, Q4H PRN  polyethylene glycol (GLYCOLAX) packet 17 g, Daily  senna (SENOKOT) tablet 17.2 mg, Daily PRN  bisacodyl (DULCOLAX) suppository 10 mg, Daily PRN  febuxostat (ULORIC) tablet 40 mg, Daily  tamsulosin (FLOMAX) capsule 0.4 mg, Daily  gabapentin (NEURONTIN) capsule 300 mg, BID  furosemide (LASIX) tablet 80 mg, BID  pantoprazole (PROTONIX) tablet 40 mg, QAM AC  ranolazine (RANEXA) extended release tablet 1,000 mg, BID  insulin lispro (HUMALOG) injection vial 0-12 Units, TID WC  insulin lispro (HUMALOG) injection vial 0-6 Units, Nightly  glucose (GLUTOSE) 40 % oral gel 15 g, PRN  dextrose 50 % IV solution, PRN  glucagon (rDNA) injection 1 mg, PRN  dextrose 5 % solution, PRN  apixaban (ELIQUIS) tablet 5 mg, BID  aspirin chewable tablet 81 mg, Daily  atorvastatin (LIPITOR) tablet 80 mg, Nightly  busPIRone (BUSPAR) tablet 7.5 mg, TID  carvedilol (COREG) tablet 6.25 mg, BID WC  ferrous sulfate (IRON 325) tablet 325 mg, BID WC  insulin glargine (LANTUS) injection vial 48 Units, BID      Objective:  CURRENT TEMPERATURE:  Temp: 97.6 °F (36.4 °C)  MAXIMUM TEMPERATURE OVER 24HRS:  Temp (24hrs), Av.6 °F (36.4 °C), Min:97.6 °F (36.4 °C), Max:97.6 °F (36.4 °C)    CURRENT RESPIRATORY RATE:  Resp: 16  CURRENT PULSE:  Pulse: 68  CURRENT BLOOD PRESSURE:  BP: (!) 131/59  24HR BLOOD PRESSURE RANGE:  Systolic (58JMR), QDS:879 , Min:125 , GZO:131   ; Diastolic (04BXD), MYS:93, Min:59, Max:66    24HR INTAKE/OUTPUT:    No intake or output data in the 24 hours ending 21 0825  Patient Vitals for the past 96 hrs (Last 3 readings):   Weight   08/13/21 1810 239 lb 3.2 oz (108.5 kg)   08/13/21 1428 244 lb 12.8 oz (111 kg)     Physical Exam:  GENERAL APPEARANCE: Alert and cooperative, and appears to be in no acute distress. HEAD: normocephalic  CARDIAC: Normal S1 and S2. No S3, S4 or murmurs. Rhythm is regular. LUNGS: Clear to auscultation and percussion without rales, rhonchi, wheezing or diminished breath sounds. ABDOMEN: Soft with normal bowel sounds; no palpable organomegaly. MUSKULOSKELETAL: Adequately aligned spine. No joint erythema or tenderness. EXTREMITIES: 2+ bilateral lower extremity edema. Peripheral pulses intact. NEURO: Right-sided weakness    Labs:    BMP:   Recent Labs     08/14/21  0648 08/15/21  0737 08/16/21  0637   * 135 137   K 4.2 4.6 4.2   CL 96* 98 99   CO2 25 25 23   BUN 36* 45* 57*   CREATININE 2.62* 3.05* 3.58*   GLUCOSE 158* 156* 172*   CALCIUM 8.9 9.3 9.1      Assessment/plan:    1. Acute kidney injury superimposed on chronic kidney disease stage IV differentials include contrast mediated acute tubular necrosis secondary to recent CT angiogram at [facility] and progression of underlying chronic kidney disease. She started acute hemodialysis at [facility] on 8/6/2021. Patient has ongoing indications for intermittent hemodialysis we will schedule for acute hemodialysis today. We will arrange for IR to switch dialysis catheter to tunneled hemodialysis catheter. Continue to monitor GFR and urine output closely for evidence of renal function. Basic metabolic profile daily. 2.  Systemic hypertension - blood pressure control is adequate. 3.  S/p recent left frontal ischemic stroke with right-sided weakness. Continue physical therapy exercises. 4.  Peripheral edema - continue furosemide 80 mg p.o. twice daily. Prognosis is guarded.     Electronically signed by Hill Rivera MD on 8/16/2021 at 8:25 AM

## 2021-08-16 NOTE — PROGRESS NOTES
[facility] Internal Medicine    CONSULTATION / HISTORY AND PHYSICAL EXAMINATION            Date:   8/16/2021  Patient name:  Terrence Cavanaugh  Date of admission:  8/11/2021  5:47 PM  MRN:   [id number]  Account:  [de-identified]  YOB: 1960  PCP:    AFSANEH Mejia CNP  Room:   [address] [place] [address]  Code Status:    Full Code    Physician Requesting Consult: Willian Frost MD    Reason for Consult:  medical management    Chief Complaint:     No chief complaint on file. Right upper and lower extremity weakness  History Obtained From:     Patient medical record nursing staff    History of Present Illness:   57-year-old  lady morbidly obese class III BMI 40.74 initially admitted to [facility] with right arm weakness and altered mental status she had an MRI done which showed acute lacunar infarct of the left frontal lobe medically managed history of chronic kidney disease had acute kidney injury required hemodialysis with right-sided Mauricio catheter in place in the jugular vein with improving renal function hemodialysis on hold    Noted to have some epistaxis this morning refusing to have anterior nasal packing done patient on anticoagulants  Multiple comorbid condition including hypertension hyperlipidemia coronary disease congestive heart failure diabetes mellitus    8/14   Patient is doing much better today  No new complaints    8/16   Plan for tunnel catheter  Blood sugars running high.   Past Medical History:     Past Medical History:   Diagnosis Date    Backache, unspecified     CHF (congestive heart failure) (HCC)     Chronic kidney disease     Coronary atherosclerosis of artery bypass graft     Cramp of limb     Gallstones     Hypertension     Insomnia     Pneumonia     Type II or unspecified type diabetes mellitus with renal manifestations, not stated as uncontrolled(250.40)     Type II or unspecified type diabetes mellitus without mention of complication, not stated as uncontrolled     Unspecified vitamin D deficiency         Past Surgical History:     Past Surgical History:   Procedure Laterality Date    ANKLE FRACTURE SURGERY      BREAST SURGERY      CARPAL TUNNEL RELEASE      CHOLECYSTECTOMY, OPEN N/A     CORONARY ARTERY BYPASS GRAFT      x3    HAND SURGERY      KNEE ARTHROSCOPY      right    TONSILLECTOMY          Medications Prior to Admission:     Prior to Admission medications    Medication Sig Start Date End Date Taking? Authorizing Provider   busPIRone (BUSPAR) 7.5 MG tablet TAKE 1 TABLET BY MOUTH THREE TIMES DAILY 7/1/21   Historical Provider, MD   gabapentin (NEURONTIN) 300 MG capsule TAKE 1 CAPSULE BY MOUTH TWICE DAILY 6/21/21 7/23/21  AFSANEH Armas CNP   ELIQUIS 5 MG TABS tablet  6/5/21   Historical Provider, MD   blood glucose test strips (DEN CONTOUR TEST) strip 1 each by In Vitro route 3 times daily As needed. 4/30/21   AFSANEH Armas CNP   Dulaglutide (TRULICITY) 1.5 WO/7.8YE SOPN 1.5 mg    Historical Provider, MD   insulin glargine (BASAGLAR KWIKPEN) 100 UNIT/ML injection pen Basaglar KwikPen U-100 Insulin 100 unit/mL (3 mL) subcutaneous   Inject 43 units twice a day by subcutaneous route as directed for 90 days.     Historical Provider, MD   insulin lispro, 1 Unit Dial, (HUMALOG KWIKPEN) 100 UNIT/ML SOPN Humalog KwikPen (U-100) Insulin 100 unit/mL subcutaneous   15 units with each meal plus 2-10 units as SS if BS>150    Historical Provider, MD   Biotin w/ Vitamins C & E (HAIR/SKIN/NAILS PO) Take 200 mg by mouth daily    Historical Provider, MD   allopurinol (ZYLOPRIM) 100 MG tablet Take 100 mg by mouth daily    Historical Provider, MD   sharps container 1 each by Does not apply route as needed (dirty pen needles) 4/19/21   AFSANEH Armas CNP   ferrous sulfate (BRIAN-ARCHIE) 325 (65 Fe) MG tablet Take 1 tablet by mouth daily 4/19/21   AFSANEH Armas CNP   allopurinol (ZYLOPRIM) 100 MG tablet Take 1 tablet by mouth daily 4/14/21   AFSANEH Park CNP   isosorbide mononitrate (IMDUR) 30 MG extended release tablet Take 1 tablet by mouth daily 4/9/21   Yolanda Driver MD   traZODone (DESYREL) 50 MG tablet Take 75 mg by mouth nightly    Historical Provider, MD   bumetanide (BUMEX) 2 MG tablet Take 1 tablet by mouth daily 3/30/21   AFSANEH Park CNP   atorvastatin (LIPITOR) 40 MG tablet Take 1 tablet by mouth daily 3/30/21   AFSANEH Park CNP   carvedilol (COREG) 6.25 MG tablet Take 1 tablet by mouth 2 times daily 3/30/21   AFSANEH Park CNP   ranolazine (RANEXA) 500 MG extended release tablet Take 1 tablet by mouth 2 times daily  Patient taking differently: Take 1,000 mg by mouth 2 times daily  3/30/21   AFSANEH Park CNP   nitroGLYCERIN (NITROSTAT) 0.4 MG SL tablet Place 1 tablet under the tongue every 5 minutes as needed for Chest pain. 3/29/15   Valentino Riling, APRN - CNP   Insulin Pen Needle (BD ULTRA-FINE PEN NEEDLES) 29G X 12.7MM MISC 1 each by Does not apply route 6 times daily. For use with each insulin 12/23/14   Corey Ovalle MD   docusate sodium (COLACE) 100 MG capsule Take 100 mg by mouth 2 times daily. Historical Provider, MD   Lancets 28G MISC Inject 1 each into the skin 3 times daily. 10/30/14   Corey Ovalle MD        Allergies:     Adhesive tape, Ace inhibitors, Iv dye [iodides], and Metformin and related    Social History:     Tobacco:    reports that she has never smoked. She has never used smokeless tobacco.  Alcohol:      reports no history of alcohol use. Drug Use:  reports no history of drug use. Family History:     Family History   Problem Relation Age of Onset    Diabetes Father     Heart Failure Father        Review of Systems:     Positive and Negative as described in HPI.     CONSTITUTIONAL:  negative for fevers, chills, sweats, fatigue, weight loss  HEENT:  negative for vision, hearing changes, runny nose, throat pain    RESPIRATORY:  negative for shortness of breath, cough, congestion, wheezing. CARDIOVASCULAR:  negative for chest pain, palpitations. GASTROINTESTINAL:  negative for nausea, vomiting, diarrhea, constipation, change in bowel habits, abdominal pain   GENITOURINARY:  negative for difficulty of urination, burning with urination, frequency   INTEGUMENT:  negative for rash, skin lesions, easy bruising   HEMATOLOGIC/LYMPHATIC:  negative for swelling/edema   ALLERGIC/IMMUNOLOGIC:  negative for urticaria , itching  ENDOCRINE:  negative increase in drinking, increase in urination, hot or cold intolerance  MUSCULOSKELETAL:  negative joint pains, muscle aches, swelling of joints  NEUROLOGICAL: Positive for right upper extremity and right lower extremity mild weakness  BEHAVIOR/PSYCH: Denies depression    Physical Exam:     /64   Pulse 81   Temp 97.7 °F (36.5 °C) (Oral)   Resp 16   Ht 5' 5\" (1.651 m)   Wt 239 lb 3.2 oz (108.5 kg)   SpO2 96%   BMI 39.80 kg/m²   Temp (24hrs), Av.7 °F (36.5 °C), Min:97.6 °F (36.4 °C), Max:97.7 °F (36.5 °C)    Recent Labs     08/15/21  2103 21  0611 21  1101 21  1549   POCGLU 274* 169* 287* 204*       Intake/Output Summary (Last 24 hours) at 2021 1655  Last data filed at 2021 1101  Gross per 24 hour   Intake 680 ml   Output --   Net 680 ml     Dialysis catheter noted in the jugular vein right side of the neck  General Appearance:  alert, well appearing, and in no acute distress  Mental status: oriented to person, place, and time with normal affect  Head:  normocephalic, atraumatic.   Eye: no icterus, redness, pupils equal and reactive, extraocular eye movements intact, conjunctiva clear  Ear: normal external ear, no discharge, hearing intact  Nose:  no drainage noted  Small amount of epistaxis noted    Mouth: mucous membranes moist  Neck: supple, no carotid bruits, thyroid not palpable  Lungs: Bilateral equal air entry, clear to ausculation, no wheezing, rales or rhonchi, normal effort  Cardiovascular: normal rate, regular rhythm, no murmur, gallop, rub. Abdomen: Soft, nontender, nondistended, normal bowel sounds, no hepatomegaly or splenomegaly  Neurologic: There are no new focal motor or sensory deficits, normal muscle tone and bulk, no abnormal sensation, normal speech, cranial nerves II through XII grossly intact  Positive for right upper and right lower extremity 4 out of 5 strength strength  Skin: No gross lesions, rashes, bruising or bleeding on exposed skin area  Extremities:  peripheral pulses palpable, no pedal edema or calf pain with palpation  Psych:      Investigations:      Laboratory Testing:  Recent Results (from the past 24 hour(s))   POC Glucose Fingerstick    Collection Time: 08/15/21  9:03 PM   Result Value Ref Range    POC Glucose 274 (H) 65 - 105 mg/dL   POC Glucose Fingerstick    Collection Time: 08/16/21  6:11 AM   Result Value Ref Range    POC Glucose 169 (H) 65 - 105 mg/dL   Basic Metabolic Prof    Collection Time: 08/16/21  6:37 AM   Result Value Ref Range    Glucose 172 (H) 70 - 99 mg/dL    BUN 57 (H) 8 - 23 mg/dL    CREATININE 3.58 (H) 0.50 - 0.90 mg/dL    Bun/Cre Ratio NOT REPORTED 9 - 20    Calcium 9.1 8.6 - 10.4 mg/dL    Sodium 137 135 - 144 mmol/L    Potassium 4.2 3.7 - 5.3 mmol/L    Chloride 99 98 - 107 mmol/L    CO2 23 20 - 31 mmol/L    Anion Gap 15 9 - 17 mmol/L    GFR Non-African American 13 (L) >60 mL/min    GFR  16 (L) >60 mL/min    GFR Comment          GFR Staging NOT REPORTED    POC Glucose Fingerstick    Collection Time: 08/16/21 11:01 AM   Result Value Ref Range    POC Glucose 287 (H) 65 - 105 mg/dL   PROTIME-INR    Collection Time: 08/16/21 11:03 AM   Result Value Ref Range    Protime 17.1 (H) 11.8 - 14.6 sec    INR 1.4    APTT    Collection Time: 08/16/21 11:03 AM   Result Value Ref Range    PTT 42.4 (H) 24.0 - 36.0 sec   POC Glucose Fingerstick    Collection Time: 08/16/21  3:49 PM   Result Value Ref Range    POC Glucose 204 (H) 65 - 105 mg/dL           Consultations:   IP CONSULT TO DIETITIAN  IP CONSULT TO SOCIAL WORK  IP CONSULT TO INTERNAL MEDICINE  IP CONSULT TO NEPHROLOGY  Assessment :      Primary Problem  <principal problem not specified>    Active Hospital Problems    Diagnosis Date Noted    Acute cerebrovascular accident (CVA) ([facility] [I63.9] 08/11/2021    Chronic ischemic heart disease [I25.9] 07/23/2021    Anemia in stage 4 chronic kidney disease ([facility] [N18.4, D63.1] 05/03/2021    Essential hypertension [I10] 05/03/2021    History of coronary artery bypass graft [Z95.1] 03/31/2021    Diabetic polyneuropathy associated with type 2 diabetes mellitus ([facility] [E11.42] 02/17/2020    Chronic diastolic heart failure ([facility] [I50.32] 09/20/2018    Mixed hyperlipidemia [E78.2] 07/27/2016    Coronary artery disease [I25.10] 05/04/2015   Active Problems:    Coronary artery disease    Chronic diastolic heart failure (HCC)    Diabetic polyneuropathy associated with type 2 diabetes mellitus ([facility]    History of coronary artery bypass graft    Mixed hyperlipidemia    Essential hypertension    Anemia in stage 4 chronic kidney disease ([facility]    Chronic ischemic heart disease    Acute cerebrovascular accident (CVA) ([facility]  Resolved Problems:    * No resolved hospital problems.  *        Plan:     38-year-old lady morbidly obese class III BMI 40.74 history of chronic kidney disease hypertension coronary disease chronic diastolic congestive heart failure  New diagnosis of acute lacunar infarct with right upper and lower extremity weakness  Acute on chronic kidney disease required hemodialysis with a Mauricio catheter  Diabetes mellitus with neuropathy Lantus sliding scale, some readings of high blood sugars, increasing Lantus to 53 units twice a day, monitoring closely  Gabapentin continue for diabetic neuropathy  Anemia of chronic kidney disease received IV iron  Coronary artery disease continue carvedilol and Ranexa  Chronic diastolic congestive heart failure compensated continue Lasix  Plan for hd per nephrologist          Juan Mello MD  8/16/2021  4:55 PM    Copy sent to AFSANEH Rothman - FRANKY    Please note that this chart was generated using voice recognition Dragon dictation software. Although every effort was made to ensure the accuracy of this automated transcription, some errors in transcription may have occurred.

## 2021-08-16 NOTE — PLAN OF CARE
Problem: Falls - Risk of:  Goal: Will remain free from falls  Outcome: Ongoing     Problem: Falls - Risk of:  Goal: Absence of physical injury  Outcome: Ongoing     Problem: Skin Integrity:  Goal: Will show no infection signs and symptoms  Outcome: Ongoing     Problem: Skin Integrity:  Goal: Absence of new skin breakdown  Outcome: Ongoing     Problem: Pain:  Goal: Pain level will decrease  Outcome: Ongoing     Problem: Pain:  Goal: Control of acute pain  Outcome: Ongoing     Problem: Musculor/Skeletal Functional Status  Goal: Highest potential functional level  Outcome: Ongoing     Problem: Nutrition  Goal: Optimal nutrition therapy  Outcome: Ongoing     Problem:  Activity:  Goal: Fatigue will decrease  Outcome: Ongoing

## 2021-08-16 NOTE — PROGRESS NOTES
Physical Medicine & Rehabilitation  Progress Note      Subjective:      59year-old female with ischemic CVA. Patient is doing well today. She had one episode of stool incontinence overnight. She reports having these a couple of times in the past. She felt she was in a deep sleep overnight. ROS:  Denies fevers, chills, sweats. No chest pain, palpitations, lightheadedness. Denies coughing, wheezing or shortness of breath. Denies abdominal pain, nausea, diarrhea or constipation. No new areas of joint pain. Denies new areas of numbness or weakness. Denies new anxiety or depression issues. No new skin problems. Rehabilitation:   Progressing in therapies. PT:  Restrictions/Precautions: Fall Risk  Required Braces or Orthoses  Right Lower Extremity Brace:  (Lymphedema brace )  Left Lower Extremity Brace:  (Lymphedema brace )   Transfers  Sit to Stand: Contact guard assistance  Stand to sit: Contact guard assistance  Bed to Chair: Contact guard assistance  Stand Pivot Transfers: Contact guard assistance  Lateral Transfers: Contact guard assistance  Comment: Transfers assessed with rolling walker. Steady, no LOB noted  Ambulation 1  Surface: level tile  Device: Rolling Walker  Assistance: Contact guard assistance  Quality of Gait: Slightly unsteady, no LOB noted. Decrease step length, increased LLE shakiness. Gait Deviations: Slow Annie, Decreased step height, Decreased step length, Increased ELISABET  Distance: 60ft  Comments: Sudden back pain at end of ambulation    Transfers  Sit to Stand: Contact guard assistance  Stand to sit: Contact guard assistance  Bed to Chair: Contact guard assistance  Stand Pivot Transfers: Contact guard assistance  Lateral Transfers: Contact guard assistance  Comment: Transfers assessed with rolling walker.  Steady, no LOB noted  Ambulation  Ambulation?: Yes  Ambulation 1  Surface: level tile  Device: Rolling Walker  Assistance: Contact guard assistance  Quality of Gait: Slightly unsteady, no LOB noted. Decrease step length, increased LLE shakiness. Gait Deviations: Slow Annie, Decreased step height, Decreased step length, Increased ELISABET  Distance: 60ft  Comments: Sudden back pain at end of ambulation    Surface: level tile  Ambulation 1  Surface: level tile  Device: Rolling Walker  Assistance: Contact guard assistance  Quality of Gait: Slightly unsteady, no LOB noted. Decrease step length, increased LLE shakiness. Gait Deviations: Slow Annie, Decreased step height, Decreased step length, Increased ELISABET  Distance: 60ft  Comments: Sudden back pain at end of ambulation    OT:  ADL  Equipment Provided: Reacher, Sock aid  Feeding: Modified independent  (per pt report)  Grooming: Modified independent   UE Bathing: Stand by assistance  LE Bathing: None  UE Dressing: Setup  LE Dressing: Minimal assistance  Toileting: Minimal assistance (A with LB clothing mgmt 2* R calf bleeding. , otherwise SBA)  Additional Comments: Pt declines all ADLs this morning after hving BM in bed which required a full shower. Reports she already completed oral care as well. Toileting completed in therapy gym. After toilet, pt noted bleeding to R lateral calf. Nurse Amy Eller notified and present to address and apply bandage.     Instrumental ADL's  Instrumental ADLs: Yes     Balance  Sitting Balance: Supervision  Standing Balance: Contact guard assistance (SBA-CGA)   Standing Balance  Time: AM: 1:48, 10 sec, 30 sec,  Activity: AM: tabletop task, toilet transfer  Comment: no LOB  Functional Mobility  Functional - Mobility Device: Rolling Walker  Activity: Other (few feet top table)  Assist Level: Contact guard assistance  Functional Mobility Comments: Verbal cues for hand placement and safety     Bed mobility  Bridging: Stand by assistance  Rolling to Left: Modified independent  Rolling to Right: Modified independent  Supine to Sit: Modified independent  Sit to Supine: Modified independent  Scooting: Modified independent  Comment: HOB elevated  Transfers  Sit to stand: Stand by assistance  Stand to sit: Stand by assistance  Transfer Comments: Verbal cues for hand placement and safety   Toilet Transfers  Toilet - Technique: Stand pivot, To right, To left  Equipment Used: Raised toilet seat without rails (grab bar R side)  Toilet Transfer: Contact guard assistance  Toilet Transfers Comments: Increased time with verbal cues for hand placement and safety     Shower Transfers  Shower - Transfer To: Transfer tub bench  Shower - Technique: Ambulating  Shower Transfers: Contact Guard  Shower Transfers Comments: Increased time with verbal cues for safety over threshold. SPEECH:  Subjective: [x]? Alert     [x]? Cooperative     []? Confused     []? Agitated    []? Lethargic     Objective/Assessment:  Attention: Functional throughout     Recall: paragraph recall- 100%     Problem Solving/Reasoning: word deductions- 90%, 100% c cues. Planning projects- min to mod A     Other:     Objective:  /64   Pulse 81   Temp 97.7 °F (36.5 °C) (Oral)   Resp 16   Ht 5' 5\" (1.651 m)   Wt 239 lb 3.2 oz (108.5 kg)   SpO2 96%   BMI 39.80 kg/m²       GEN: Well developed, well nourished, in NAD  HEENT:  NCAT. PERRL. EOMI. Mucous membranes pink and moist. R IJ triple lumen in place. PULM:  Clear to ausculation. No rales or rhonchi. Respirations WNL and unlabored. CV:  bradycardic rate regular rhythm. No murmurs or gallops. GI:  Abdomen soft. Nontender. Non-distended. BS + and equal.    NEUROLOGICAL: A&O x3. Sensation intact to light touch. MSK:  Functional ROM all extremities. Motor testing 5/5 key muscles LUE and LLE. 4+/5 key muscles RUE and RLE. [place] Stands SKIN: Warm dry and intact. Good turgor. Scattered ecchymosis BUEs. EXTREMITIES:  No calf tenderness to palpation. Chronic stable BLE lymphedema. PSYCH: Mood WNL. Appropriately interactive. Affect WNL.      Diagnostics:     CBC:   No results for input(s): WBC, RBC, HGB, HCT, MCV, RDW, PLT in the last 72 hours. BMP:   Recent Labs     08/14/21  0648 08/15/21  0737 08/16/21  0637   * 135 137   K 4.2 4.6 4.2   CL 96* 98 99   CO2 25 25 23   BUN 36* 45* 57*   CREATININE 2.62* 3.05* 3.58*   GLUCOSE 158* 156* 172*     BNP: No results for input(s): BNP in the last 72 hours. PT/INR: No results for input(s): PROTIME, INR in the last 72 hours. APTT: No results for input(s): APTT in the last 72 hours. CARDIAC ENZYMES: No results for input(s): CKMB, CKMBINDEX, TROPONINT in the last 72 hours. Invalid input(s): CKTOTAL;3  FASTING LIPID PANEL:  Lab Results   Component Value Date    CHOL 105 04/09/2015    HDL 43 04/09/2015    TRIG 168 04/09/2015     LIVER PROFILE: No results for input(s): AST, ALT, ALB, BILIDIR, BILITOT, ALKPHOS in the last 72 hours.      Current Medications:   Current Facility-Administered Medications: sodium citrate 4 % injection 1.3 mL, 1.3 mL, Intracatheter, PRN  sodium citrate 4 % injection 1.3 mL, 1.3 mL, Intracatheter, PRN  traZODone (DESYREL) tablet 100 mg, 100 mg, Oral, Nightly  sodium chloride (OCEAN, BABY AYR) 0.65 % nasal spray 1 spray, 1 spray, Each Nostril, PRN  acetaminophen (TYLENOL) tablet 650 mg, 650 mg, Oral, Q4H PRN  polyethylene glycol (GLYCOLAX) packet 17 g, 17 g, Oral, Daily  senna (SENOKOT) tablet 17.2 mg, 2 tablet, Oral, Daily PRN  bisacodyl (DULCOLAX) suppository 10 mg, 10 mg, Rectal, Daily PRN  febuxostat (ULORIC) tablet 40 mg, 40 mg, Oral, Daily  tamsulosin (FLOMAX) capsule 0.4 mg, 0.4 mg, Oral, Daily  gabapentin (NEURONTIN) capsule 300 mg, 300 mg, Oral, BID  furosemide (LASIX) tablet 80 mg, 80 mg, Oral, BID  pantoprazole (PROTONIX) tablet 40 mg, 40 mg, Oral, QAM AC  ranolazine (RANEXA) extended release tablet 1,000 mg, 1,000 mg, Oral, BID  insulin lispro (HUMALOG) injection vial 0-12 Units, 0-12 Units, Subcutaneous, TID WC  insulin lispro (HUMALOG) injection vial 0-6 Units, 0-6 Units, Subcutaneous, Nightly  glucose (GLUTOSE) 40 % oral gel 15 g, 15 g, Oral, PRN  dextrose 50 % IV solution, 12.5 g, Intravenous, PRN  glucagon (rDNA) injection 1 mg, 1 mg, Intramuscular, PRN  dextrose 5 % solution, 100 mL/hr, Intravenous, PRN  apixaban (ELIQUIS) tablet 5 mg, 5 mg, Oral, BID  aspirin chewable tablet 81 mg, 81 mg, Oral, Daily  atorvastatin (LIPITOR) tablet 80 mg, 80 mg, Oral, Nightly  busPIRone (BUSPAR) tablet 7.5 mg, 7.5 mg, Oral, TID  carvedilol (COREG) tablet 6.25 mg, 6.25 mg, Oral, BID WC  ferrous sulfate (IRON 325) tablet 325 mg, 325 mg, Oral, BID WC  insulin glargine (LANTUS) injection vial 48 Units, 48 Units, Subcutaneous, BID      Impression/Plan:   Impaired ADLs, gait, and mobility due to:      1. Ischemic CVA L frontal lobe with mild R dominant hemiplegia:  PT/OT for gait, mobility, strengthening, endurance, ADLs, and self care. On ASA, atorvastatin  2. HTN/CAD/Angina/Diastolic CHF: on carvedilol, furosemide, Ranexa  3. Insomnia: on Trazodone - was taking 75 mg at home - increased to 100 mg. Sleep improved. 4. Anxiety: on buspirone  5. DM with neuropathy: on lantus, sliding scale, gabapentin  6. AURELIA on CKD IV: Initiated on HD at [place] [id number]. Nephrology following - continuing lasix at 80 mg BID. For HD MWF. Nephrology planning tunnel cath placement. 7. Anemia of chronic disease: on ferrous sulfate. Had IV iron at [facility]. Hb low but stable. Monitoring weekly  8. Urine retention: on flomax  9. Bowel Management: Miralax daily, senokot prn, dulcolax prn. Noting some constipation secondary to iron. 10. DVT Prophylaxis:  SCD's while in bed, AGUSTIN's and Eliquis  11.  Internal Medicine for medical management    Electronically signed by Vero Mccurdy MD on 8/16/2021 at 10:16 AM      This note is created with the assistance of a speech recognition program.  While intending to generate a document that actually reflects the content of the visit, the document can still have some errors including those of syntax and sound a like substitutions which may escape proof reading. In such instances, actual meaning can be extrapolated by contextual diversion.

## 2021-08-16 NOTE — PROGRESS NOTES
HEMODIALYSIS PRE-TREATMENT NOTE    Patient Identifiers prior to treatment: Name, , MRN    Isolation Required:  Yes                      Isolation Type: Contact, MRSA       (please document if patient is being managed as a PUI/COVID-19 patient)        Hepatitis status:                           Date Drawn                             Result  Hepatitis B Surface Antigen 21     Negative                     Hepatitis B Surface Antibody 21 48.51        Hepatitis B Core Antibody 21 Negative          How was Hepatitis Status verified: Epic     Was a copy of the labs you documented provided to facility for the patient's chart:     Hemodialysis orders verified: Yes    Access Within normal limits ( I.e. s/s of infection,...): CVC right neck     Pre-Assessment completed: Yes    Pre-dialysis report received from: Marva Soto RN                      Time: 1200

## 2021-08-16 NOTE — PROGRESS NOTES
probable acute to subacute lacunar infarct of the left frontal lobe. AURELIA on CKD stage 4 likely d/t contrast induced nephropathy. Dialysis was initiated on 8/6/21, had 2 treatment session, labs improving. Pt. has history of diabetes mellitus type II, CAD s/p CABG (2005), cervical stenosis, back pain, lymphedema, diastolic CHF, DVT, urinary retention, and iron deficiency anemia. Pt. admitted to ARU from [place] [address] 8/11/21. Response To Previous Treatment: Patient with no complaints from previous session. Family / Caregiver Present: No  Referring Practitioner: Dr. Christofer Guadalupe: Patient had large BM in the middle of the night that required a full change and shower. Patient with increased fatigue and back pain this date d/t all of the activity throughout the day  Pain Screening  Patient Currently in Pain: Yes  Pain Assessment  Pain Assessment: 0-10  Pain Level: 6  Pain Type: Chronic pain  Pain Location: Back  Pain Orientation: Lower  Vital Signs  Patient Currently in Pain: Yes       Orientation  Orientation  Overall Orientation Status: Within Normal Limits  Objective   Bed mobility  Rolling to Left: Supervision  Rolling to Right: Supervision  Supine to Sit: Supervision  Sit to Supine: Supervision  Scooting: Modified independent  Transfers  Sit to Stand: Stand by assistance  Stand to sit: Stand by assistance  Bed to Chair: Stand by assistance  Stand Pivot Transfers: Stand by assistance  Comment: Transfers assessed with rolling walker. Steady, no LOB noted  Ambulation  Ambulation?: Yes  Ambulation 1  Surface: level tile  Device: Rolling Walker  Assistance: Contact guard assistance  Quality of Gait: Slightly unsteady, no LOB noted. Decrease step length, increased LLE shakiness. Gait Deviations: Slow Annie;Decreased step height;Decreased step length; Increased ELISABET  Distance: 120ft   Comments: back pain during ambulation causing patient to have multiple standing rest breaks bending over the RW. WC brought up to patient   Stairs/Curb  Stairs?: Yes  Stairs  # Steps : 8 (+5 )  Stairs Height:  (4\"/6\")  Rails: Bilateral  Device: No Device  Assistance: Supervision  Comment: patient performs step to pattern. Performed 8 steps and then had a seated rest break and completed 5 more         Other exercises  Other exercises?: Yes  Other exercises 1: Seated (B) LE exercises x20 (2#, lime green theraband)  Other exercises 2: seated UBE 5 minutes FWD/BWD   Other exercises 3: seated balloon tap d/t increased back pain with standing ~5 minutes, Patient utilized B UEs and LEs  Other exercises 4: NuStep L4 x15 minutes          Comment: rest breaks PRN. Goals  Short term goals  Time Frame for Short term goals: 5 days   Short term goal 1: Pt. to perform bed mobility independently. Short term goal 2: Pt. to tolerate 30 to 45 minutes of therapeutic exercise to improve strength and endurance for increased functional mobility. Short term goal 3: Pt. to improve seated static and dynamic balance to good. Short term goal 4: Pt. to improve standing static balance to fair+ and standing dynamic balance to fair. Short term goal 5: Pt. to ambulate 200ft. with supervision using a rollator. Short term goal 6: Pt. to ascend/descend 3 steps with CGA using one railing  Long term goals  Time Frame for Long term goals : By DC  Long term goal 1: Pt. to perform all transfers independently or with Mod-I. Long term goal 2: Pt. to improve standing static and dynamic balance to good. Long term goal 3: Pt. to ambulate 100ft. on an unlevel/ inclined surface with Mod-I using a rollator. Long term goal 4: Pt. to ascend/descend one flight of stairs with supervision   Long term goal 5: Pt. to ambulate 200ft. in 2 minutes with Mod-I using a rollator. Long term goal 6: Pt. to improve PASS score from 24/36 to 35/36. Patient Goals   Patient goals : Improve balance and strength to be able to walk independently with rollator.      Plan Plan  Times per week: 1.5hrs/day  Times per day: Daily  Specific instructions for Next Treatment: Balance training, transfer training, gait training  Current Treatment Recommendations: Strengthening, Balance Training, Transfer Training, Functional Mobility Training, ROM, Endurance Training, Gait Training, Stair training, Pain Management, Home Exercise Program, Safety Education & Training, Neuromuscular Re-education, Patient/Caregiver Education & Training, Equipment Evaluation, Education, & procurement  Safety Devices  Type of devices:  All fall risk precautions in place, Call light within reach, Gait belt, Patient at risk for falls, Nurse notified        08/16/21 1250 08/16/21 1443   PT Individual Minutes   Time In 5160 6021   Time Out 4724 6671   Minutes 60 21   PT Concurrent Minutes   Time In  --  4214   Time Out  --  2010   Minutes  --  9     Electronically signed by Alfred Ragsdale PTA on 8/16/21 at 2:49 PM EDT

## 2021-08-16 NOTE — PROGRESS NOTES
[address]   ACUTE REHABILITATION OCCUPATIONAL THERAPY  DAILY NOTE    Date: 21  Patient Name: Tanvi Evans      Room: 9241/7122-00    MRN: [id number]   : 1960  (59 y.o.)  Gender: female   Referring Practitioner: Ana Cristina Ballard MD  Diagnosis: CVA  Additional Pertinent Hx: Presented with acute right arm weakness and mental status change. MRI brain showed probable acute to subacute lacunar infarct of the left frontal lobe. AURELIA on CKD stage 4 likely d/t contrast induced nephropathy. Dialysis was initiated on 21, had 2 treatment session, labs improving. Pt. has history of diabetes mellitus type II, CAD s/p CABG (), cervical stenosis, back pain, lymphedema, diastolic CHF, DVT, urinary retention, and iron deficiency anemia. Pt. admitted to ARU from [place] [address] 21. Restrictions  Restrictions/Precautions: Fall Risk  Required Braces or Orthoses  Right Lower Extremity Brace:  (Lymphedema brace )  Left Lower Extremity Brace:  (Lymphedema brace )  Required Braces or Orthoses?: Yes (Lymphedema wraps)    Subjective  Subjective: \"I had an accident this morning so I already got cleaned up\"  Comments: Pt is pleasant and motivated  Patient Currently in Pain: Yes  Pain Level: 8  Pain Location: Back  Pain Orientation: Upper; Lower  Restrictions/Precautions: Fall Risk  Overall Orientation Status: Within Functional Limits  Patient Observation  Observations: Pt declines donning lymphedema braces this AM.   Pain Assessment  Pain Assessment: 0-10  Pain Level: 8  Pain Type: Chronic pain  Pain Location: Back  Pain Orientation: Upper, Lower    Objective  Cognition  Overall Cognitive Status: Impaired  Following Directions:  Follows two step commands  Attention Span: Appears intact  Memory: Decreased recall of recent events  Sequencing and Organization: Assistance required to generate solutions  Additional Comments: slower processing with tasks  Perception  Overall Perceptual Status: WFL  Balance  Sitting Balance: Supervision  Standing Balance: Contact guard assistance (SBA-CGA)  Transfers  Sit to stand: Stand by assistance  Stand to sit: Stand by assistance  Transfer Comments: Verbal cues for hand placement and safety  Standing Balance  Time: AM: 1:48, 10 sec, 30 sec,; PM: 1-2 min X5  Activity: AM: tabletop task, toilet transfer; PM: tabletop tasks  Comment: no LOB, good reaching back for w/c  Functional Mobility  Functional - Mobility Device: Rolling Walker  Activity: Other; To/from bathroom (few feet to table)  Assist Level: Contact guard assistance  Functional Mobility Comments: Verbal cues for hand placement and safety  Toilet Transfers  Toilet - Technique: Stand pivot; To right; To left  Equipment Used: Raised toilet seat without rails (grab bar R side)  Toilet Transfer: Contact guard assistance  Toilet Transfers Comments: PM: toilet transfer completed by ambulation with SBA     Type of ROM/Therapeutic Exercise  Type of ROM/Therapeutic Exercise: Free weights  Comment: Pt completed BUE exercises with use of 2# free weight for 10-15 reps. Pt required rest breaks as needed due to fatige. Pt reports decreased strength in RUE compared to LUE when completing task. Pt completed activity to increase strength and overall endurance to increase safety and independence with ADL and IADL tasks. Exercises  Scapular Protraction: x  Scapular Retraction: x  Shoulder Flexion: x  Shoulder Extension: x  Shoulder ABduction: x  Shoulder ADduction: x  Horizontal ABduction: x  Horizontal ADduction: x  Elbow Flexion: x  Elbow Extension: x  Supination: x  Pronation: x  Wrist Flexion: x  Wrist Extension: x     Additional Activities: AM: GARCIA facilitated pt in standing tasks at tabletop level for increased standing tolerance. Pt utilizes graded resistive cloths pins with L hand for facilitation of increased hand strength and FMC.  Pt tlerates standing for 1:48 before requiring seated rest break due to increased back pain. On seconds attmept at standing, pt requires immediate use of bathroom. ; PM: pt engages in standing tasks incorporating [facility] and  strength. Pt able to stand for 1-2 min X5 with good safety for sit<>stand. Pt education provided on 4 Ps of energy conservation and carrying over to home safety. Pt demos wtih good understanding. ADL  LE Dressing: Minimal assistance (assist threading BLE while on raised toilet)  Toileting: Minimal assistance (A with LB clothing mgmt 2* R calf bleeding. , otherwise SBA)  Additional Comments: Pt declines all ADLs this morning after hving BM in bed which required a full shower. Reports she already completed oral care as well. Toileting completed in therapy gym. After toilet, pt noted bleeding to R lateral calf. Nurse Amy Eller notified and present to address and apply bandage. ; PM: Pt toileting completed again. Assessment  Performance deficits / Impairments: Decreased ADL status; Decreased functional mobility ; Decreased strength;Decreased safe awareness;Decreased cognition;Decreased endurance;Decreased balance;Decreased high-level IADLs;Decreased coordination  Prognosis: Good  Discharge Recommendations: Patient would benefit from continued therapy after discharge;Home with assist PRN  Activity Tolerance: Patient Tolerated treatment well     Equipment Recommendations  Equipment Needed:  (TBD)       Patient Education: OT POC, safety with functional mobility and use of RW, pursed lip breathing during functional tasks, initiation of rest  breaks throughout tasks for optimal safety, energy conservation and work simplification techniques including the 4 Ps of e/c. Patient Goals   Patient goals : \"I would like to have energy and balance and to just be able to walk around and go to the store and walk around. \"   Learner:patient  Method: explanation       Outcome: acknowledged understanding of         Plan  Plan  Times per week: 5-7  Times per day: Twice a day  Current Treatment Recommendations: Self-Care / ADL, Strengthening, ROM, Balance Training, Functional Mobility Training, Endurance Training, Neuromuscular Re-education, Cognitive Reorientation, Pain Management, Safety Education & Training, Patient/Caregiver Education & Training, Equipment Evaluation, Education, & procurement, Home Management Training, Cognitive/Perceptual Training  Patient Goals   Patient goals : \"I would like to have energy and balance and to just be able to walk around and go to the store and walk around. \"   Short term goals  Time Frame for Short term goals: By 5-6 days  Short term goal 1: Pt will complete lower body dressing with CGA and good safety  Short term goal 2: Pt will complete functional trasnfers/mobility during self care tasks with supervision and good safety with use of least restrictive device  Short term goal 3: Pt will tolerate standing 5+ minutes during functional activity of choice with good safety   Short term goal 4: Pt will verbalize/demonstrate good understanding of energy conservation techniques to increase safety and independence with self care tasks  Short term goal 5: Pt will participate in 15+ minutes of therapeutic exercises/functional activities to increase safety and independence with self care tasks.    Long term goals  Time Frame for Long term goals : By discharge  Long term goal 1: Pt will complete BADLs with modified independence and good safety  Long term goal 2: Pt will complete functional transfers/mobility during self care tasks with modified independence and good safety with use of least restrictive device  Long term goal 3: Pt will tolerate standing 8+ minutes during functional activity of choice with good safety  Long term goal 4: Pt will complete simple meal prep/light house keeping task with modified independence and good safety   Long term goal 5: Pt will verbalize/demonstrate good understanding of home safety/fall prevention to increase safety and independence with self care tasks.    Long term goals 6: Pt will demonstrated increased  strength and coordination during self care tasks as evident by and an improvement on the 9 hole peg test by 3 seconds and increased  strength by 5#        08/16/21 1037 08/16/21 1538   OT Individual Minutes   Time In [address] [place] [address] 404   Time Out 0832 1313   Minutes 60 32     Electronically signed by DAYANA Gutierrez on 8/16/21 at 3:40 PM EDT

## 2021-08-16 NOTE — PATIENT CARE CONFERENCE
[address]   ACUTE REHABILITATION  TEAM CONFERENCE NOTE  Date: 21  Patient Name: Ridge Mckinney       Room: 1619/5838-42  MRN: [id number]       : 1960  (62 y.o.)     Gender: female   Referring Practitioner: Dr. Bryant Lopez cerebrovascular accident (CVA) [facility]) [I63.9]  Diagnosis: CVA     NURSING  Bladder  Always Continent  Bowel   Always Continent  Date of Last BM:   Intervention    None     Wounds/Incisions/Ulcers: Incision healing well, coccyx skin tear-mepalex   Medication Education Program: Patient able to manage medications and being educated by nursing  Pain: no pain concerns to address    Fall Risk:  Falling star program initiated    PHYSICAL THERAPY    Bed Mobility  Rolling: Supervision  Supine to Sit: Supervision  Sit to Supine: Supervision  Scooting: Modified independent    Transfers:  Sit to Stand: Stand by assistance  Stand to sit: Stand by assistance  Bed to Chair: Stand by assistance  Comment: rest breaks PRN. Ambulation 1  Surface: level tile  Device: Rolling Walker  Assistance: Contact guard assistance  Quality of Gait: Slightly unsteady, no LOB noted. Decrease step length, increased LLE shakiness. Gait Deviations: Slow Annie;Decreased step height;Decreased step length; Increased ELISABET  Distance: 120ft   Comments: back pain during ambulation causing patient to have multiple standing rest breaks bending over the RW. WC brought up to patient   Ambulation 2  Surface - 2: level tile  Device 2: Rolling Walker  Assistance 2: Contact guard assistance  Quality of Gait 2: Slightly unsteady, no LOB noted. Decrease step length, increased LLE shakiness. Gait Deviations: Slow Annie;Decreased step height;Decreased step length; Increased ELISABET  Distance: 200ft  Comments: Sudden back pain at end of ambulation    # Steps : 8 (+5 )  Stairs Height:  (4\"/6\")  Rails: Bilateral  Device: No Device  Assistance: Supervision  Comment: patient performs step to pattern.  Performed 8 steps and then had a seated rest break and completed 5 more                  Equipment Needed Pt has a Rollator at home       Goals  Time Frame for Short term goals: 5 days   Short term goal 1: Pt. to perform bed mobility independently. Short term goal 2: Pt. to tolerate 30 to 45 minutes of therapeutic exercise to improve strength and endurance for increased functional mobility. Short term goal 3: Pt. to improve seated static and dynamic balance to good. Short term goal 4: Pt. to improve standing static balance to fair+ and standing dynamic balance to fair. Short term goal 5: Pt. to ambulate 200ft. with supervision using a rollator. Short term goal 6: Pt. to ascend/descend 3 steps with CGA using one railing      OCCUPATIONAL THERAPY  SELF CARE   Equipment Provided: Reacher, Sock aid  Eating             Modified [place]    Oral Hygiene            Modified [place]    Shower/Bathe Self               Supervision / Touch assist  Upper Body Dressing              Setup   Lower Body Dressing            Putting On/Taking Off Footwear             Minimal assistance (assist threading BLE while on raised toilet)   Toilet Transfer             Toilet - Technique: Stand pivot; To right; To left  Equipment Used: Raised toilet seat without rails (grab bar R side)  Toilet Transfer: Contact guard assistance  Toilet Transfers Comments: PM: toilet transfer completed by ambulation with SBA   Toileting Hygiene            Minimal assistance (A with LB clothing mgmt 2* R calf bleeding. , otherwise SBA)    Balance  Sitting Balance: Supervision  Standing Balance: Contact guard assistance (SBA-CGA)  Standing Balance  Time: AM: 1:48, 10 sec, 30 sec,; PM: 1-2 min X5  Activity: AM: tabletop task, toilet transfer; PM: tabletop tasks  Comment: no LOB, good reaching back for w/c    Equipment Recommendations  Equipment Needed:  (TBD)       Short term goals  Time Frame for Short term goals: By 5-6 days  Short term goal 1: Pt will complete lower body dressing with CGA and good safety  Short term goal 2: Pt will complete functional trasnfers/mobility during self care tasks with supervision and good safety with use of least restrictive device  Short term goal 3: Pt will tolerate standing 5+ minutes during functional activity of choice with good safety   Short term goal 4: Pt will verbalize/demonstrate good understanding of energy conservation techniques to increase safety and independence with self care tasks  Short term goal 5: Pt will participate in 15+ minutes of therapeutic exercises/functional activities to increase safety and independence with self care tasks. SPEECH THERAPY  Supervision level recall and high level reasoning  Short Term Goal: mod I    NUTRITION  Weight: 239 lb 3.2 oz (108.5 kg) / Body mass index is 39.8 kg/m². Patient generally eats well, % of most meals. Was not very hungry at lunch today, ate only desert and soup. Currently on Regular. New on dialysis, 3rd dialysis treatment today. Weight stable. Blood glucose 172, . Educated patient on dialysis diet. Will monitor labs, change diet to renal as needed. Please see nutrition note for details. SOCIAL WORK ASSESSMENT  Assessment:Pt lives independently at home and her mother lives with her. Her father lives at [facility] and she was visiting with him daily. Pt has 2 sisters who are helpful and supportive. Pt had home health in the past; she believes she had [facility]. Contacted [facility]; they have no record of pt. Deanna French, they have received a referral but never officially opened or provided services. Will follow up with pt regarding home health services.    Pre-Admission Status:  Lives With: Family (Lives with mother )  Type of Home:  (Condo )  Home Layout: One level  Home Access: Ramped entrance  Bathroom Shower/Tub: Walk-in shower, Doors (Threshold to step over )  Bathroom Toilet: Handicap height  Bathroom Equipment: Grab bars in shower, Discharge Date: 8/25/2021  Home evaluation needed? Home Evaluation Indication (NO, Requires ReEval, YES/Date): No home evaluation need indicated for patient at this time  Overnight or Day Pass: No  Factors facilitating achievement of predicted outcomes: Family support, Motivated, Cooperative, Pleasant, Good insight into deficits and Knowledge about rehab  Barriers to the achievement of predicted outcomes: Decreased endurance, Upper extremity weakness, Lower extremity weakness, Medical complications, Skin Care, Wound Care and Medication managment    Functional Goals at discharge:  Predicted Outcome: Home with familyPATIENT'S LEVEL OF ASSISTANCE: Modified independence  Discharge therapy goals:  PT: Long term goals  Time Frame for Long term goals : By DC  Long term goal 1: Pt. to perform all transfers independently or with Mod-I. Long term goal 2: Pt. to improve standing static and dynamic balance to good. Long term goal 3: Pt. to ambulate 100ft. on an unlevel/ inclined surface with Mod-I using a rollator. Long term goal 4: Pt. to ascend/descend one flight of stairs with supervision   Long term goal 5: Pt. to ambulate 200ft. in 2 minutes with Mod-I using a rollator. Long term goal 6: Pt. to improve PASS score from 24/36 to 35/36.    OT:Long term goals  Time Frame for Long term goals : By discharge  Long term goal 1: Pt will complete BADLs with modified independence and good safety  Long term goal 2: Pt will complete functional transfers/mobility during self care tasks with modified independence and good safety with use of least restrictive device  Long term goal 3: Pt will tolerate standing 8+ minutes during functional activity of choice with good safety  Long term goal 4: Pt will complete simple meal prep/light house keeping task with modified independence and good safety   Long term goal 5: Pt will verbalize/demonstrate good understanding of home safety/fall prevention to increase safety and independence with self care tasks. Long term goals 6: Pt will demonstrated increased  strength and coordination during self care tasks as evident by and an improvement on the 9 hole peg test by 3 seconds and increased  strength by 5#  ST: mod I    Team Members Present at Conference:  :  Sravan Mosher  Occupational Therapist: Boogie Martell OT   Physical Therapist: Jocelin Sanchez PT  Speech Therapist: Dana WEISSCCC/SLP  Nurse: Sarai Fonseca RN   Dietary/Nutrition: Jelena Christie RD, LD    Pastoral Care: Peggy Shaw  CMG: Inna Benitez RN    I approve the established interdisciplinary plan of care as documented within the medical record of Nathalia Estrada.     Rayburn Felty, MD

## 2021-08-16 NOTE — PROGRESS NOTES
HEMODIALYSIS POST TREATMENT NOTE    Treatment time ordered: 3.5 Hours    Actual treatment time: 27 minutes    UltraFiltration Goal: 5496-3018 ml as tolerated  UltraFiltration Removed: +220      Pre Treatment weight: inaccurate weight  Post Treatment weight: Floor to obtain accurate weight  Estimated Dry Weight:     Access used:     Central Venous Catheter:          Tunneled or Non-tunneled: Non tunneled CVC            Site: right neck          Access Flow: Poor      Internal Access:       AV Fistula or AV Graft:          Site:        Access Flow:        Sign and symptoms of infection:        If YES:     Medications or blood products given: None    Chronic outpatient schedule: [facility]    Chronic outpatient unit: TBD, [facility]    Summary of response to treatment: Treatment ran poorly and for only 27 minutes due to poor access flow. No interventions were successful. Dr. Isidra Moss notified and gave orders to terminate treatment this date and reschedule for tomorrow after a new permcath is placed. Primary RN and patient informed. Explain if orders NOT met, was physician notified:Dr. Isidra Moss notified via phone and updated on poor access flow      ACES flowsheet faxed to patient unit/ placed in patient chart: Yes    Post assessment completed: Yes    Report given to: Marva Soto RN      * Intra-treatment documented Safety Checks include the followin) Access and face visible at all times. 2) All connections and blood lines are secure with no kinks. 3) NVL alarm engaged. 4) Hemosafe device applied (if applicable). 5) No collapse of Arterial or Venous blood chambers. 6) All blood lines / pump segments in the air detectors.

## 2021-08-16 NOTE — PROGRESS NOTES
[facility] Internal Medicine    CONSULTATION / HISTORY AND PHYSICAL EXAMINATION            Date:   8/15/2021  Patient name:  John Parr  Date of admission:  8/11/2021  5:47 PM  MRN:   [id number]  Account:  [de-identified]  YOB: 1960  PCP:    AFSANEH Barrow CNP  Room:   [facility]  Code Status:    Full Code    Physician Requesting Consult: Shu Grove MD    Reason for Consult:  medical management    Chief Complaint:     No chief complaint on file.     Right upper and lower extremity weakness  History Obtained From:     Patient medical record nursing staff    History of Present Illness:   75-year-old  lady morbidly obese class III BMI 40.74 initially admitted to [facility] with right arm weakness and altered mental status she had an MRI done which showed acute lacunar infarct of the left frontal lobe medically managed history of chronic kidney disease had acute kidney injury required hemodialysis with right-sided Mauricio catheter in place in the jugular vein with improving renal function hemodialysis on hold    Noted to have some epistaxis this morning refusing to have anterior nasal packing done patient on anticoagulants  Multiple comorbid condition including hypertension hyperlipidemia coronary disease congestive heart failure diabetes mellitus    8/14   Patient is doing much better today  No new complaints    8/15   Patient very upset, that her creatinine is increasing  Has reading of high blood sugars  Past Medical History:     Past Medical History:   Diagnosis Date    Backache, unspecified     CHF (congestive heart failure) (HCC)     Chronic kidney disease     Coronary atherosclerosis of artery bypass graft     Cramp of limb     Gallstones     Hypertension     Insomnia     Pneumonia     Type II or unspecified type diabetes mellitus with renal manifestations, not stated as uncontrolled(250.40)     Type II or unspecified type diabetes mellitus without mention of complication, not stated as uncontrolled     Unspecified vitamin D deficiency         Past Surgical History:     Past Surgical History:   Procedure Laterality Date    ANKLE FRACTURE SURGERY      BREAST SURGERY      CARPAL TUNNEL RELEASE      CHOLECYSTECTOMY, OPEN N/A     CORONARY ARTERY BYPASS GRAFT      x3    HAND SURGERY      KNEE ARTHROSCOPY      right    TONSILLECTOMY          Medications Prior to Admission:     Prior to Admission medications    Medication Sig Start Date End Date Taking? Authorizing Provider   busPIRone (BUSPAR) 7.5 MG tablet TAKE 1 TABLET BY MOUTH THREE TIMES DAILY 7/1/21   Historical Provider, MD   gabapentin (NEURONTIN) 300 MG capsule TAKE 1 CAPSULE BY MOUTH TWICE DAILY 6/21/21 7/23/21  AFSANEH Hernandez CNP   ELIQUIS 5 MG TABS tablet  6/5/21   Historical Provider, MD   blood glucose test strips (DEN CONTOUR TEST) strip 1 each by In Vitro route 3 times daily As needed. 4/30/21   AFSANEH Hernandez CNP   Dulaglutide (TRULICITY) 1.5 QK/1.2DF SOPN 1.5 mg    Historical Provider, MD   insulin glargine (BASAGLAR KWIKPEN) 100 UNIT/ML injection pen Basaglar KwikPen U-100 Insulin 100 unit/mL (3 mL) subcutaneous   Inject 43 units twice a day by subcutaneous route as directed for 90 days.     Historical Provider, MD   insulin lispro, 1 Unit Dial, (HUMALOG KWIKPEN) 100 UNIT/ML SOPN Humalog KwikPen (U-100) Insulin 100 unit/mL subcutaneous   15 units with each meal plus 2-10 units as SS if BS>150    Historical Provider, MD   Biotin w/ Vitamins C & E (HAIR/SKIN/NAILS PO) Take 200 mg by mouth daily    Historical Provider, MD   allopurinol (ZYLOPRIM) 100 MG tablet Take 100 mg by mouth daily    Historical Provider, MD   sharps container 1 each by Does not apply route as needed (dirty pen needles) 4/19/21   AFSANEH Hernandez CNP   ferrous sulfate (BRIAN-ARCHIE) 325 (65 Fe) MG tablet Take 1 tablet by mouth daily 4/19/21   AFSANEH Hernandez CNP allopurinol (ZYLOPRIM) 100 MG tablet Take 1 tablet by mouth daily 4/14/21   AFSANEH Nevarez CNP   isosorbide mononitrate (IMDUR) 30 MG extended release tablet Take 1 tablet by mouth daily 4/9/21   Jaki Miranda MD   traZODone (DESYREL) 50 MG tablet Take 75 mg by mouth nightly    Historical Provider, MD   bumetanide (BUMEX) 2 MG tablet Take 1 tablet by mouth daily 3/30/21   AFSANEH Nevarez CNP   atorvastatin (LIPITOR) 40 MG tablet Take 1 tablet by mouth daily 3/30/21   AFSANEH Nevarez CNP   carvedilol (COREG) 6.25 MG tablet Take 1 tablet by mouth 2 times daily 3/30/21   AFSANEH Nevarez CNP   ranolazine (RANEXA) 500 MG extended release tablet Take 1 tablet by mouth 2 times daily  Patient taking differently: Take 1,000 mg by mouth 2 times daily  3/30/21   AFSANEH Nevarez CNP   nitroGLYCERIN (NITROSTAT) 0.4 MG SL tablet Place 1 tablet under the tongue every 5 minutes as needed for Chest pain. 3/29/15   AFSANEH Anthony CNP   Insulin Pen Needle (BD ULTRA-FINE PEN NEEDLES) 29G X 12.7MM MISC 1 each by Does not apply route 6 times daily. For use with each insulin 12/23/14   Marissa Franks MD   docusate sodium (COLACE) 100 MG capsule Take 100 mg by mouth 2 times daily. Historical Provider, MD   Lancets 28G MISC Inject 1 each into the skin 3 times daily. 10/30/14   Marissa Franks MD        Allergies:     Adhesive tape, Ace inhibitors, Iv dye [iodides], and Metformin and related    Social History:     Tobacco:    reports that she has never smoked. She has never used smokeless tobacco.  Alcohol:      reports no history of alcohol use. Drug Use:  reports no history of drug use. Family History:     Family History   Problem Relation Age of Onset    Diabetes Father     Heart Failure Father        Review of Systems:     Positive and Negative as described in HPI.     CONSTITUTIONAL:  negative for fevers, chills, sweats, fatigue, weight loss  HEENT:  negative for vision, hearing changes, runny nose, throat pain    RESPIRATORY:  negative for shortness of breath, cough, congestion, wheezing. CARDIOVASCULAR:  negative for chest pain, palpitations. GASTROINTESTINAL:  negative for nausea, vomiting, diarrhea, constipation, change in bowel habits, abdominal pain   GENITOURINARY:  negative for difficulty of urination, burning with urination, frequency   INTEGUMENT:  negative for rash, skin lesions, easy bruising   HEMATOLOGIC/LYMPHATIC:  negative for swelling/edema   ALLERGIC/IMMUNOLOGIC:  negative for urticaria , itching  ENDOCRINE:  negative increase in drinking, increase in urination, hot or cold intolerance  MUSCULOSKELETAL:  negative joint pains, muscle aches, swelling of joints  NEUROLOGICAL: Positive for right upper extremity and right lower extremity mild weakness  BEHAVIOR/PSYCH: Denies depression    Physical Exam:     BP (!) 131/59   Pulse 68   Temp 97.6 °F (36.4 °C)   Resp 16   Ht 5' 5\" (1.651 m)   Wt 239 lb 3.2 oz (108.5 kg)   SpO2 95%   BMI 39.80 kg/m²   Temp (24hrs), Av.8 °F (36.6 °C), Min:97.6 °F (36.4 °C), Max:97.9 °F (36.6 °C)    Recent Labs     08/15/21  0623 08/15/21  1109 08/15/21  1630 08/15/21  2103   POCGLU 136* 248* 234* 274*     No intake or output data in the 24 hours ending 08/15/21 2324  Dialysis catheter noted in the jugular vein right side of the neck  General Appearance:  alert, well appearing, and in no acute distress  Mental status: oriented to person, place, and time with normal affect  Head:  normocephalic, atraumatic.   Eye: no icterus, redness, pupils equal and reactive, extraocular eye movements intact, conjunctiva clear  Ear: normal external ear, no discharge, hearing intact  Nose:  no drainage noted  Small amount of epistaxis noted    Mouth: mucous membranes moist  Neck: supple, no carotid bruits, thyroid not palpable  Lungs: Bilateral equal air entry, clear to ausculation, no wheezing, rales or rhonchi, normal effort  Cardiovascular: normal rate, regular rhythm, no murmur, gallop, rub. Abdomen: Soft, nontender, nondistended, normal bowel sounds, no hepatomegaly or splenomegaly  Neurologic: There are no new focal motor or sensory deficits, normal muscle tone and bulk, no abnormal sensation, normal speech, cranial nerves II through XII grossly intact  Positive for right upper and right lower extremity 4 out of 5 strength strength  Skin: No gross lesions, rashes, bruising or bleeding on exposed skin area  Extremities:  peripheral pulses palpable, no pedal edema or calf pain with palpation  Psych: Investigations:      Laboratory Testing:  Recent Results (from the past 24 hour(s))   POC Glucose Fingerstick    Collection Time: 08/15/21  6:23 AM   Result Value Ref Range    POC Glucose 136 (H) 65 - 105 mg/dL   SPECIMEN REJECTION    Collection Time: 08/15/21  6:32 AM   Result Value Ref Range    Specimen Source BLOOD     Ordered Test BMP     Reason for Rejection Unable to perform testing: Specimen hemolyzed.      - NOT REPORTED    Basic Metabolic Panel    Collection Time: 08/15/21  7:37 AM   Result Value Ref Range    Glucose 156 (H) 70 - 99 mg/dL    BUN 45 (H) 8 - 23 mg/dL    CREATININE 3.05 (H) 0.50 - 0.90 mg/dL    Bun/Cre Ratio NOT REPORTED 9 - 20    Calcium 9.3 8.6 - 10.4 mg/dL    Sodium 135 135 - 144 mmol/L    Potassium 4.6 3.7 - 5.3 mmol/L    Chloride 98 98 - 107 mmol/L    CO2 25 20 - 31 mmol/L    Anion Gap 12 9 - 17 mmol/L    GFR Non-African American 15 (L) >60 mL/min    GFR  19 (L) >60 mL/min    GFR Comment          GFR Staging NOT REPORTED    POC Glucose Fingerstick    Collection Time: 08/15/21 11:09 AM   Result Value Ref Range    POC Glucose 248 (H) 65 - 105 mg/dL   POC Glucose Fingerstick    Collection Time: 08/15/21  4:30 PM   Result Value Ref Range    POC Glucose 234 (H) 65 - 105 mg/dL   POC Glucose Fingerstick    Collection Time: 08/15/21  9:03 PM   Result Value Ref Range    POC Glucose 274 (H) 65 - 105 mg/dL           Consultations: IP CONSULT TO DIETITIAN  IP CONSULT TO SOCIAL WORK  IP CONSULT TO INTERNAL MEDICINE  IP CONSULT TO NEPHROLOGY  Assessment :      Primary Problem  <principal problem not specified>    Active Hospital Problems    Diagnosis Date Noted    Acute cerebrovascular accident (CVA) ([facility] [I63.9] 08/11/2021    Chronic ischemic heart disease [I25.9] 07/23/2021    Anemia in stage 4 chronic kidney disease ([facility] [N18.4, D63.1] 05/03/2021    Essential hypertension [I10] 05/03/2021    History of coronary artery bypass graft [Z95.1] 03/31/2021    Diabetic polyneuropathy associated with type 2 diabetes mellitus ([facility] [E11.42] 02/17/2020    Chronic diastolic heart failure ([facility] [I50.32] 09/20/2018    Mixed hyperlipidemia [E78.2] 07/27/2016    Coronary artery disease [I25.10] 05/04/2015   Active Problems:    Coronary artery disease    Chronic diastolic heart failure (HCC)    Diabetic polyneuropathy associated with type 2 diabetes mellitus ([facility]    History of coronary artery bypass graft    Mixed hyperlipidemia    Essential hypertension    Anemia in stage 4 chronic kidney disease ([facility]    Chronic ischemic heart disease    Acute cerebrovascular accident (CVA) ([facility]  Resolved Problems:    * No resolved hospital problems.  *        Plan:     43-year-old lady morbidly obese class III BMI 40.74 history of chronic kidney disease hypertension coronary disease chronic diastolic congestive heart failure  New diagnosis of acute lacunar infarct with right upper and lower extremity weakness  Acute on chronic kidney disease required hemodialysis with a Mauricio catheter  Diabetes mellitus with neuropathy Lantus sliding scale, some readings of high blood sugars, monitoring closely  Gabapentin continue for diabetic neuropathy  Anemia of chronic kidney disease received IV iron  Coronary artery disease continue carvedilol and Ranexa  Chronic diastolic congestive heart failure compensated continue Lasix  Plan for hd per

## 2021-08-16 NOTE — PROGRESS NOTES
Patient awakened to a HUGE involuntary diarrhea stool that required a shower, bed change, floor and side rails of bed cleansed, and housekeeping to clean out shower. A brief was applied after shower. Bed lined with extra draws in the case patient has such another stool. Will update next shift to hold stool softeners.

## 2021-08-16 NOTE — PROGRESS NOTES
Speech Language Pathology  Speech Language Pathology  [facility] Acute Rehab Unit at [facility]    Cognitive Treatment Note    Date: 8/16/2021  Patients Name: Jason Diallo  MRN: [id number]  Diagnosis:   Patient Active Problem List   Diagnosis Code    Coronary artery disease I25.10    Chronic diastolic heart failure (HCC) I50.32    Diabetic polyneuropathy associated with type 2 diabetes mellitus (HCC) E11.42    History of coronary artery bypass graft Z95.1    Iron deficiency anemia D50.9    Spinal stenosis of lumbar region with neurogenic claudication M48.062    Mixed hyperlipidemia E78.2    Stage 4 chronic kidney disease ([facility] N18.4    Type 2 diabetes mellitus with kidney complication, with long-term current use of insulin (HCC) E11.29, Z79.4    Syncope and collapse R55    Obesity, Class II, BMI 35-39.9 E66.9    Thyroid nodule greater than or equal to 1 cm in diameter incidentally noted on imaging study E04.1    Essential hypertension I10    Anemia in stage 4 chronic kidney disease (HCC) N18.4, D63.1    Chronic ischemic heart disease I25.9    Acute cerebrovascular accident (CVA) ([facility] I63.9       Pain: 5/10    Cognitive Treatment    Treatment time: [id number]    Subjective: [x] Alert [x] Cooperative     [] Confused     [] Agitated    [] Lethargic    Objective/Assessment:  Attention: Functional throughout    Recall: paragraph recall- 100%    Problem Solving/Reasoning: word deductions- 90%, 100% c cues. Planning projects- min to mod A    Other:     Plan:  [x] Continue ST services    [] Discharge from ST:      Discharge recommendations: [] Inpatient Rehab   [] [place]   [] Outpatient Therapy  [] Follow up at trauma clinic   [] Other:       Treatment completed by: Chasidy Angel A.CCC/SLP

## 2021-08-17 ENCOUNTER — APPOINTMENT (OUTPATIENT)
Dept: INTERVENTIONAL RADIOLOGY/VASCULAR | Age: 61
DRG: 056 | End: 2021-08-17
Attending: PHYSICAL MEDICINE & REHABILITATION
Payer: COMMERCIAL

## 2021-08-17 LAB
ANION GAP SERPL CALCULATED.3IONS-SCNC: 12 MMOL/L (ref 9–17)
BUN BLDV-MCNC: 56 MG/DL (ref 8–23)
BUN/CREAT BLD: ABNORMAL (ref 9–20)
CALCIUM SERPL-MCNC: 9.1 MG/DL (ref 8.6–10.4)
CHLORIDE BLD-SCNC: 98 MMOL/L (ref 98–107)
CO2: 24 MMOL/L (ref 20–31)
CREAT SERPL-MCNC: 3.49 MG/DL (ref 0.5–0.9)
GFR AFRICAN AMERICAN: 16 ML/MIN
GFR NON-AFRICAN AMERICAN: 13 ML/MIN
GFR SERPL CREATININE-BSD FRML MDRD: ABNORMAL ML/MIN/{1.73_M2}
GFR SERPL CREATININE-BSD FRML MDRD: ABNORMAL ML/MIN/{1.73_M2}
GLUCOSE BLD-MCNC: 187 MG/DL (ref 65–105)
GLUCOSE BLD-MCNC: 192 MG/DL (ref 65–105)
GLUCOSE BLD-MCNC: 197 MG/DL (ref 65–105)
GLUCOSE BLD-MCNC: 210 MG/DL (ref 70–99)
GLUCOSE BLD-MCNC: 239 MG/DL (ref 65–105)
POTASSIUM SERPL-SCNC: 4.2 MMOL/L (ref 3.7–5.3)
SODIUM BLD-SCNC: 134 MMOL/L (ref 135–144)

## 2021-08-17 PROCEDURE — 36415 COLL VENOUS BLD VENIPUNCTURE: CPT

## 2021-08-17 PROCEDURE — 97129 THER IVNTJ 1ST 15 MIN: CPT

## 2021-08-17 PROCEDURE — 82947 ASSAY GLUCOSE BLOOD QUANT: CPT

## 2021-08-17 PROCEDURE — 97535 SELF CARE MNGMENT TRAINING: CPT

## 2021-08-17 PROCEDURE — 97530 THERAPEUTIC ACTIVITIES: CPT

## 2021-08-17 PROCEDURE — 99232 SBSQ HOSP IP/OBS MODERATE 35: CPT | Performed by: INTERNAL MEDICINE

## 2021-08-17 PROCEDURE — 97116 GAIT TRAINING THERAPY: CPT

## 2021-08-17 PROCEDURE — 6370000000 HC RX 637 (ALT 250 FOR IP): Performed by: PHYSICAL MEDICINE & REHABILITATION

## 2021-08-17 PROCEDURE — 97130 THER IVNTJ EA ADDL 15 MIN: CPT

## 2021-08-17 PROCEDURE — 80048 BASIC METABOLIC PNL TOTAL CA: CPT

## 2021-08-17 PROCEDURE — 6370000000 HC RX 637 (ALT 250 FOR IP): Performed by: INTERNAL MEDICINE

## 2021-08-17 PROCEDURE — 1180000000 HC REHAB R&B

## 2021-08-17 PROCEDURE — 97110 THERAPEUTIC EXERCISES: CPT

## 2021-08-17 PROCEDURE — 99232 SBSQ HOSP IP/OBS MODERATE 35: CPT | Performed by: PHYSICAL MEDICINE & REHABILITATION

## 2021-08-17 RX ADMIN — ASPIRIN 81 MG: 81 TABLET, CHEWABLE ORAL at 08:25

## 2021-08-17 RX ADMIN — APIXABAN 5 MG: 5 TABLET, FILM COATED ORAL at 22:50

## 2021-08-17 RX ADMIN — INSULIN LISPRO 2 UNITS: 100 INJECTION, SOLUTION INTRAVENOUS; SUBCUTANEOUS at 08:24

## 2021-08-17 RX ADMIN — CARVEDILOL 6.25 MG: 6.25 TABLET, FILM COATED ORAL at 17:04

## 2021-08-17 RX ADMIN — RANOLAZINE 1000 MG: 1000 TABLET, FILM COATED, EXTENDED RELEASE ORAL at 22:50

## 2021-08-17 RX ADMIN — CARVEDILOL 6.25 MG: 6.25 TABLET, FILM COATED ORAL at 08:25

## 2021-08-17 RX ADMIN — INSULIN LISPRO 2 UNITS: 100 INJECTION, SOLUTION INTRAVENOUS; SUBCUTANEOUS at 16:49

## 2021-08-17 RX ADMIN — GABAPENTIN 300 MG: 300 CAPSULE ORAL at 22:50

## 2021-08-17 RX ADMIN — INSULIN GLARGINE 53 UNITS: 100 INJECTION, SOLUTION SUBCUTANEOUS at 22:51

## 2021-08-17 RX ADMIN — ATORVASTATIN CALCIUM 80 MG: 80 TABLET, FILM COATED ORAL at 22:50

## 2021-08-17 RX ADMIN — APIXABAN 5 MG: 5 TABLET, FILM COATED ORAL at 08:25

## 2021-08-17 RX ADMIN — BUSPIRONE HYDROCHLORIDE 7.5 MG: 7.5 TABLET ORAL at 22:50

## 2021-08-17 RX ADMIN — FUROSEMIDE 80 MG: 40 TABLET ORAL at 08:25

## 2021-08-17 RX ADMIN — BUSPIRONE HYDROCHLORIDE 7.5 MG: 7.5 TABLET ORAL at 13:54

## 2021-08-17 RX ADMIN — PANTOPRAZOLE SODIUM 40 MG: 40 TABLET, DELAYED RELEASE ORAL at 06:09

## 2021-08-17 RX ADMIN — INSULIN LISPRO 4 UNITS: 100 INJECTION, SOLUTION INTRAVENOUS; SUBCUTANEOUS at 11:41

## 2021-08-17 RX ADMIN — POLYETHYLENE GLYCOL 3350 17 G: 17 POWDER, FOR SOLUTION ORAL at 08:34

## 2021-08-17 RX ADMIN — TRAZODONE HYDROCHLORIDE 100 MG: 100 TABLET ORAL at 22:49

## 2021-08-17 RX ADMIN — INSULIN GLARGINE 53 UNITS: 100 INJECTION, SOLUTION SUBCUTANEOUS at 08:24

## 2021-08-17 RX ADMIN — BUSPIRONE HYDROCHLORIDE 7.5 MG: 7.5 TABLET ORAL at 08:25

## 2021-08-17 RX ADMIN — FERROUS SULFATE TAB 325 MG (65 MG ELEMENTAL FE) 325 MG: 325 (65 FE) TAB at 08:25

## 2021-08-17 RX ADMIN — RANOLAZINE 1000 MG: 1000 TABLET, FILM COATED, EXTENDED RELEASE ORAL at 08:25

## 2021-08-17 RX ADMIN — FEBUXOSTAT 40 MG: 40 TABLET, FILM COATED ORAL at 08:25

## 2021-08-17 RX ADMIN — STANDARDIZED SENNA CONCENTRATE 17.2 MG: 8.6 TABLET ORAL at 08:34

## 2021-08-17 RX ADMIN — TAMSULOSIN HYDROCHLORIDE 0.4 MG: 0.4 CAPSULE ORAL at 08:25

## 2021-08-17 RX ADMIN — GABAPENTIN 300 MG: 300 CAPSULE ORAL at 08:25

## 2021-08-17 RX ADMIN — INSULIN LISPRO 1 UNITS: 100 INJECTION, SOLUTION INTRAVENOUS; SUBCUTANEOUS at 22:51

## 2021-08-17 RX ADMIN — ACETAMINOPHEN 650 MG: 325 TABLET, FILM COATED ORAL at 13:54

## 2021-08-17 RX ADMIN — FUROSEMIDE 80 MG: 40 TABLET ORAL at 17:04

## 2021-08-17 ASSESSMENT — PAIN SCALES - GENERAL
PAINLEVEL_OUTOF10: 3
PAINLEVEL_OUTOF10: 0
PAINLEVEL_OUTOF10: 6
PAINLEVEL_OUTOF10: 8
PAINLEVEL_OUTOF10: 8

## 2021-08-17 ASSESSMENT — PAIN DESCRIPTION - ORIENTATION: ORIENTATION: LOWER

## 2021-08-17 ASSESSMENT — PAIN DESCRIPTION - PAIN TYPE
TYPE: CHRONIC PAIN
TYPE: CHRONIC PAIN

## 2021-08-17 ASSESSMENT — PAIN DESCRIPTION - LOCATION: LOCATION: BACK

## 2021-08-17 NOTE — PROGRESS NOTES
Tunnel Cath     Order is in, Spoke with ARMIN Cabrera stated patient does not need to be NPO, That patient is on their list and will try to get to her today

## 2021-08-17 NOTE — PROGRESS NOTES
[facility]    CONSULTATION / HISTORY AND PHYSICAL EXAMINATION            Date:   8/17/2021  Patient name:  Ludwig Ovalle  Date of admission:  8/11/2021  5:47 PM  MRN:   [id number]  Account:  [de-identified]  YOB: 1960  PCP:    AFSANEH aC CNP  Room:   9444/6579-00  Code Status:    Full Code    Physician Requesting Consult: Lizett Albert MD    Reason for Consult:  medical management    Chief Complaint:     No chief complaint on file. Right upper and lower extremity weakness  History Obtained From:     Patient medical record nursing staff    History of Present Illness:   66-year-old  lady morbidly obese class III BMI 40.74 initially admitted to [facility] with right arm weakness and altered mental status she had an MRI done which showed acute lacunar infarct of the left frontal lobe medically managed history of chronic kidney disease had acute kidney injury required hemodialysis with right-sided Mauricio catheter in place in the jugular vein with improving renal function hemodialysis on hold    Noted to have some epistaxis this morning refusing to have anterior nasal packing done patient on anticoagulants  Multiple comorbid condition including hypertension hyperlipidemia coronary disease congestive heart failure diabetes mellitus    8/14   Patient is doing much better today  No new complaints    8/17   Plan for tunnel catheter  Blood sugars running high.   Past Medical History:     Past Medical History:   Diagnosis Date    Backache, unspecified     CHF (congestive heart failure) (HCC)     Chronic kidney disease     Coronary atherosclerosis of artery bypass graft     Cramp of limb     Gallstones     Hypertension     Insomnia     Pneumonia     Type II or unspecified type diabetes mellitus with renal manifestations, not stated as uncontrolled(250.40)     Type II or unspecified type diabetes mellitus without mention of complication, not stated as uncontrolled     Unspecified vitamin D deficiency         Past Surgical History:     Past Surgical History:   Procedure Laterality Date    ANKLE FRACTURE SURGERY      BREAST SURGERY      CARPAL TUNNEL RELEASE      CHOLECYSTECTOMY, OPEN N/A     CORONARY ARTERY BYPASS GRAFT      x3    HAND SURGERY      KNEE ARTHROSCOPY      right    TONSILLECTOMY          Medications Prior to Admission:     Prior to Admission medications    Medication Sig Start Date End Date Taking? Authorizing Provider   busPIRone (BUSPAR) 7.5 MG tablet TAKE 1 TABLET BY MOUTH THREE TIMES DAILY 7/1/21   Historical Provider, MD   gabapentin (NEURONTIN) 300 MG capsule TAKE 1 CAPSULE BY MOUTH TWICE DAILY 6/21/21 7/23/21  AFSANEH Wheeler CNP   ELIQUIS 5 MG TABS tablet  6/5/21   Historical Provider, MD   blood glucose test strips (DEN CONTOUR TEST) strip 1 each by In Vitro route 3 times daily As needed. 4/30/21   AFSANEH Wheeler CNP   Dulaglutide (TRULICITY) 1.5 WN/4.7EE SOPN 1.5 mg    Historical Provider, MD   insulin glargine (BASAGLAR KWIKPEN) 100 UNIT/ML injection pen Basaglar KwikPen U-100 Insulin 100 unit/mL (3 mL) subcutaneous   Inject 43 units twice a day by subcutaneous route as directed for 90 days.     Historical Provider, MD   insulin lispro, 1 Unit Dial, (HUMALOG KWIKPEN) 100 UNIT/ML SOPN Humalog KwikPen (U-100) Insulin 100 unit/mL subcutaneous   15 units with each meal plus 2-10 units as SS if BS>150    Historical Provider, MD   Biotin w/ Vitamins C & E (HAIR/SKIN/NAILS PO) Take 200 mg by mouth daily    Historical Provider, MD   allopurinol (ZYLOPRIM) 100 MG tablet Take 100 mg by mouth daily    Historical Provider, MD   sharps container 1 each by Does not apply route as needed (dirty pen needles) 4/19/21   AFSANEH Wheeler CNP   ferrous sulfate (BRIAN-ARCHIE) 325 (65 Fe) MG tablet Take 1 tablet by mouth daily 4/19/21   AFSANEH Wheeler CNP   allopurinol (ZYLOPRIM) 100 MG tablet Take 1 tablet by mouth daily 4/14/21   Joaquim SaintAFSANEH CNP   isosorbide mononitrate (IMDUR) 30 MG extended release tablet Take 1 tablet by mouth daily 4/9/21   Dylan Ortiz MD   traZODone (DESYREL) 50 MG tablet Take 75 mg by mouth nightly    Historical Provider, MD   bumetanide (BUMEX) 2 MG tablet Take 1 tablet by mouth daily 3/30/21   Joaquim SaintAFSANEH CNP   atorvastatin (LIPITOR) 40 MG tablet Take 1 tablet by mouth daily 3/30/21   Joaquim SaintAFSANEH  CNP   carvedilol (COREG) 6.25 MG tablet Take 1 tablet by mouth 2 times daily 3/30/21   Ansel SaintAFSANEH CNP   ranolazine (RANEXA) 500 MG extended release tablet Take 1 tablet by mouth 2 times daily  Patient taking differently: Take 1,000 mg by mouth 2 times daily  3/30/21   Joaquim Saint, APRN - CNP   nitroGLYCERIN (NITROSTAT) 0.4 MG SL tablet Place 1 tablet under the tongue every 5 minutes as needed for Chest pain. 3/29/15   AFSANEH Valenzuela CNP   Insulin Pen Needle (BD ULTRA-FINE PEN NEEDLES) 29G X 12.7MM MISC 1 each by Does not apply route 6 times daily. For use with each insulin 12/23/14   Servando Huddleston MD   docusate sodium (COLACE) 100 MG capsule Take 100 mg by mouth 2 times daily. Historical Provider, MD   Lancets 28G MISC Inject 1 each into the skin 3 times daily. 10/30/14   Servando Huddleston MD        Allergies:     Adhesive tape, Ace inhibitors, Iv dye [iodides], and Metformin and related    Social History:     Tobacco:    reports that she has never smoked. She has never used smokeless tobacco.  Alcohol:      reports no history of alcohol use. Drug Use:  reports no history of drug use. Family History:     Family History   Problem Relation Age of Onset    Diabetes Father     Heart Failure Father        Review of Systems:     Positive and Negative as described in HPI.     CONSTITUTIONAL:  negative for fevers, chills, sweats, fatigue, weight loss  HEENT:  negative for vision, hearing changes, runny nose, throat pain    RESPIRATORY:  negative for shortness of breath, cough, congestion, wheezing. CARDIOVASCULAR:  negative for chest pain, palpitations. GASTROINTESTINAL:  negative for nausea, vomiting, diarrhea, constipation, change in bowel habits, abdominal pain   GENITOURINARY:  negative for difficulty of urination, burning with urination, frequency   INTEGUMENT:  negative for rash, skin lesions, easy bruising   HEMATOLOGIC/LYMPHATIC:  negative for swelling/edema   ALLERGIC/IMMUNOLOGIC:  negative for urticaria , itching  ENDOCRINE:  negative increase in drinking, increase in urination, hot or cold intolerance  MUSCULOSKELETAL:  negative joint pains, muscle aches, swelling of joints  NEUROLOGICAL: Positive for right upper extremity and right lower extremity mild weakness  BEHAVIOR/PSYCH: Denies depression    Physical Exam:     /63   Pulse 75   Temp 97.5 °F (36.4 °C)   Resp 16   Ht 5' 5\" (1.651 m)   Wt 239 lb 3.2 oz (108.5 kg)   SpO2 97%   BMI 39.80 kg/m²   Temp (24hrs), Av.7 °F (36.5 °C), Min:97.5 °F (36.4 °C), Max:97.9 °F (36.6 °C)    Recent Labs     21  2039 21  0608 21  1050 21  1633   POCGLU 281* 197* 239* 192*     No intake or output data in the 24 hours ending 21 1749  Dialysis catheter noted in the jugular vein right side of the neck  General Appearance:  alert, well appearing, and in no acute distress  Mental status: oriented to person, place, and time with normal affect  Head:  normocephalic, atraumatic.   Eye: no icterus, redness, pupils equal and reactive, extraocular eye movements intact, conjunctiva clear  Ear: normal external ear, no discharge, hearing intact  Nose:  no drainage noted  Small amount of epistaxis noted    Mouth: mucous membranes moist  Neck: supple, no carotid bruits, thyroid not palpable  Lungs: Bilateral equal air entry, clear to ausculation, no wheezing, rales or rhonchi, normal effort  Cardiovascular: normal rate, regular rhythm, no murmur, gallop, rub. Abdomen: Soft, nontender, nondistended, normal bowel sounds, no hepatomegaly or splenomegaly  Neurologic: There are no new focal motor or sensory deficits, normal muscle tone and bulk, no abnormal sensation, normal speech, cranial nerves II through XII grossly intact  Positive for right upper and right lower extremity 4 out of 5 strength strength  Skin: No gross lesions, rashes, bruising or bleeding on exposed skin area  Extremities:  peripheral pulses palpable, no pedal edema or calf pain with palpation  Psych:      Investigations:      Laboratory Testing:  Recent Results (from the past 24 hour(s))   POC Glucose Fingerstick    Collection Time: 08/16/21  8:39 PM   Result Value Ref Range    POC Glucose 281 (H) 65 - 105 mg/dL   POC Glucose Fingerstick    Collection Time: 08/17/21  6:08 AM   Result Value Ref Range    POC Glucose 197 (H) 65 - 105 mg/dL   Basic Metabolic Prof    Collection Time: 08/17/21  6:24 AM   Result Value Ref Range    Glucose 210 (H) 70 - 99 mg/dL    BUN 56 (H) 8 - 23 mg/dL    CREATININE 3.49 (H) 0.50 - 0.90 mg/dL    Bun/Cre Ratio NOT REPORTED 9 - 20    Calcium 9.1 8.6 - 10.4 mg/dL    Sodium 134 (L) 135 - 144 mmol/L    Potassium 4.2 3.7 - 5.3 mmol/L    Chloride 98 98 - 107 mmol/L    CO2 24 20 - 31 mmol/L    Anion Gap 12 9 - 17 mmol/L    GFR Non-African American 13 (L) >60 mL/min    GFR  16 (L) >60 mL/min    GFR Comment          GFR Staging NOT REPORTED    POC Glucose Fingerstick    Collection Time: 08/17/21 10:50 AM   Result Value Ref Range    POC Glucose 239 (H) 65 - 105 mg/dL   POC Glucose Fingerstick    Collection Time: 08/17/21  4:33 PM   Result Value Ref Range    POC Glucose 192 (H) 65 - 105 mg/dL           Consultations:   IP CONSULT TO DIETITIAN  IP CONSULT TO SOCIAL WORK  IP CONSULT TO INTERNAL MEDICINE  IP CONSULT TO NEPHROLOGY  Assessment :      Primary Problem  <principal problem not specified>    Active Hospital Problems    Diagnosis Date Noted    Acute cerebrovascular accident (CVA) ([facility] [I63.9] 08/11/2021    Chronic ischemic heart disease [I25.9] 07/23/2021    Anemia in stage 4 chronic kidney disease ([facility] [N18.4, D63.1] 05/03/2021    Essential hypertension [I10] 05/03/2021    History of coronary artery bypass graft [Z95.1] 03/31/2021    Diabetic polyneuropathy associated with type 2 diabetes mellitus ([facility] [E11.42] 02/17/2020    Chronic diastolic heart failure ([facility] [I50.32] 09/20/2018    Mixed hyperlipidemia [E78.2] 07/27/2016    Coronary artery disease [I25.10] 05/04/2015   Active Problems:    Coronary artery disease    Chronic diastolic heart failure (HCC)    Diabetic polyneuropathy associated with type 2 diabetes mellitus ([facility]    History of coronary artery bypass graft    Mixed hyperlipidemia    Essential hypertension    Anemia in stage 4 chronic kidney disease ([facility]    Chronic ischemic heart disease    Acute cerebrovascular accident (CVA) ([facility]  Resolved Problems:    * No resolved hospital problems. *        Plan:     75-year-old lady morbidly obese class III BMI 40.74 history of chronic kidney disease hypertension coronary disease chronic diastolic congestive heart failure  New diagnosis of acute lacunar infarct with right upper and lower extremity weakness  Acute on chronic kidney disease required hemodialysis with a Mauricio catheter  Diabetes mellitus with neuropathy Lantus sliding scale, some readings of high blood sugars, increasing Lantus to 53 units twice a day, monitoring closely  Gabapentin continue for diabetic neuropathy  Anemia of chronic kidney disease received IV iron  Coronary artery disease continue carvedilol and Ranexa  Chronic diastolic congestive heart failure compensated continue Lasix  Plan for hd per nephrologist          Taj Estrada MD  8/17/2021  5:49 PM    Copy sent to Dr. Nessa Celaya APRN - CNP    Please note that this chart was generated using voice recognition Dragon dictation software.   Although every effort was made to ensure the accuracy of this automated transcription, some errors in transcription may have occurred.

## 2021-08-17 NOTE — PLAN OF CARE
Problem: Falls - Risk of:  Goal: Will remain free from falls  Description: Will remain free from falls  8/17/2021 1351 by Jerona Merlin, RN  Outcome: Ongoing, Patient is alert and oriented, able to make needs known.  Call light within reach, bed low and locked      Problem: Skin Integrity:  Goal: Will show no infection signs and symptoms  Description: Will show no infection signs and symptoms  8/17/2021 1351 by Jerona Merlin, RN  Outcome: Ongoing, encouraged to reposition      Problem: Pain:  Goal: Pain level will decrease  Description: Pain level will decrease  8/17/2021 1351 by Jerona Merlin, RN  Outcome: Ongoing, denies pain at this time      Problem: Musculor/Skeletal Functional Status  Goal: Highest potential functional level  8/17/2021 1351 by Jerona Merlin, RN  Outcome: Ongoing, Patient working with therapy

## 2021-08-17 NOTE — PROGRESS NOTES
Nutrition Education    · Verbally reviewed information with PATIENT  · Educated patient and gave handouts; Getting started on diet for dialysis, discussed nutrients and foods to watch sodium, protein, fluids, phosphorus and potassium and Reading labels for these nutrients. · Written educational materials provided. · Contact name and number provided. · Refer to Patient Education activity for more details.       Terrie Garces RD, LD  Office phone [phone]

## 2021-08-17 NOTE — PROGRESS NOTES
NEPHROLOGY PROGRESS NOTE    Patient :  Ranjith Hill; 59 y.o. MRN# [id number]  Location:  2617/2617-01  Attending:  Jayla Taylor MD  Admit Date:  8/11/2021   Hospital Day: 6    Reason for consultation: Management of acute kidney injury superimposed on chronic kidney disease stage IV. Consulting physician: Zachary Martinez MD.    Interval history: Patient was seen and examined today and she does not have shortness of breath at rest. Patient denies abdominal pain, nausea or vomiting. She has a temporary internal jugular hemodialysis catheter and GFR continues to worsen in the interdialytic period. History of Present Illness: This is a 59 y.o. female with past medical history of longstanding type 2 diabetes insulin-dependent diabetes mellitus, essential hypertension, coronary artery disease status post CABG in 2004, coronary artery stent in 2019, CHF with EF of 55%, history of mild to moderate LVH diastolic dysfunction, CKD 4 with baseline creatinine of about 2.2 to 2.4 mg/dL, patient initially presented to [facility] on 8/1/2021 with right-sided weakness, sudden onset patient was diagnosed with acute/subacute stroke in left frontal lobe with right-sided weakness, patient had CT angiogram during that. And developed acute kidney injury due to contrast-induced nephropathy requiring dialysis serum creatinine peaked to 4.9 mg/dL and patient was started on dialysis on 6 August patient had dialysis on 7 August, and dialysis was held follow kidney function renal recovery and subsequently patient was transferred to acute rehab on 10 August.  Nephrology is consulted for ongoing management of  acute kidney injury on CKD. Patient is feeling better weakness slightly improved patient still have peripheral edema patient is on diuretics most recent serum creatinine is 2.9 mg/dL, BUN 57  Urine output is not measured.   During hospital stay at [facility] patient did have urinary retention and had Pantoja catheter which was removed 2 days ago. Denies any urinary complaints. Medication review showed use of  ARB's and diuretics.     Current Medications:    insulin glargine (LANTUS) injection vial 53 Units, BID  sodium citrate 4 % injection 1.3 mL, PRN  sodium citrate 4 % injection 1.3 mL, PRN  traZODone (DESYREL) tablet 100 mg, Nightly  sodium chloride (OCEAN, BABY AYR) 0.65 % nasal spray 1 spray, PRN  acetaminophen (TYLENOL) tablet 650 mg, Q4H PRN  polyethylene glycol (GLYCOLAX) packet 17 g, Daily  senna (SENOKOT) tablet 17.2 mg, Daily PRN  bisacodyl (DULCOLAX) suppository 10 mg, Daily PRN  febuxostat (ULORIC) tablet 40 mg, Daily  tamsulosin (FLOMAX) capsule 0.4 mg, Daily  gabapentin (NEURONTIN) capsule 300 mg, BID  furosemide (LASIX) tablet 80 mg, BID  pantoprazole (PROTONIX) tablet 40 mg, QAM AC  ranolazine (RANEXA) extended release tablet 1,000 mg, BID  insulin lispro (HUMALOG) injection vial 0-12 Units, TID WC  insulin lispro (HUMALOG) injection vial 0-6 Units, Nightly  glucose (GLUTOSE) 40 % oral gel 15 g, PRN  dextrose 50 % IV solution, PRN  glucagon (rDNA) injection 1 mg, PRN  dextrose 5 % solution, PRN  apixaban (ELIQUIS) tablet 5 mg, BID  aspirin chewable tablet 81 mg, Daily  atorvastatin (LIPITOR) tablet 80 mg, Nightly  busPIRone (BUSPAR) tablet 7.5 mg, TID  carvedilol (COREG) tablet 6.25 mg, BID WC  ferrous sulfate (IRON 325) tablet 325 mg, BID       Objective:  CURRENT TEMPERATURE:  Temp: 97.5 °F (36.4 °C)  MAXIMUM TEMPERATURE OVER 24HRS:  Temp (24hrs), Av.7 °F (36.5 °C), Min:97.5 °F (36.4 °C), Max:97.9 °F (36.6 °C)    CURRENT RESPIRATORY RATE:  Resp: 16  CURRENT PULSE:  Pulse: 72  CURRENT BLOOD PRESSURE:  BP: (!) 133/56  24HR BLOOD PRESSURE RANGE:  Systolic (87CMY), EVU:823 , Min:110 , TI   ; Diastolic (76CYJ), WTZ:24, Min:56, Max:70    24HR INTAKE/OUTPUT:    No intake or output data in the 24 hours ending 21 1510  Patient Vitals for the past 96 hrs (Last 3 readings):   Weight   21 1810 239 lb 3.2 oz (108.5 kg)     Physical Exam:  GENERAL APPEARANCE: Alert and cooperative, and appears to be in no acute distress. HEAD: normocephalic  CARDIAC: Normal S1 and S2. No S3, S4 or murmurs. Rhythm is regular. LUNGS: Clear to auscultation and percussion without rales, rhonchi, wheezing or diminished breath sounds. ABDOMEN: Soft with normal bowel sounds; no palpable organomegaly. MUSKULOSKELETAL: Adequately aligned spine. No joint erythema or tenderness. EXTREMITIES: 2+ bilateral lower extremity edema. Peripheral pulses intact. NEURO: Right-sided weakness    Labs:    BMP:   Recent Labs     08/15/21  0737 08/16/21  0637 08/17/21  0624    137 134*   K 4.6 4.2 4.2   CL 98 99 98   CO2 25 23 24   BUN 45* 57* 56*   CREATININE 3.05* 3.58* 3.49*   GLUCOSE 156* 172* 210*   CALCIUM 9.3 9.1 9.1      Assessment/plan:    1. Acute kidney injury superimposed on chronic kidney disease stage IV differentials include contrast mediated acute tubular necrosis secondary to recent CT angiogram at [facility] and progression of underlying chronic kidney disease. She started acute hemodialysis at [facility] on 8/6/2021. Patient has ongoing indications for intermittent hemodialysis . Will place on a Monday/wedensday/friday schedule  We will arrange for IR to switch dialysis catheter to tunneled hemodialysis catheter. Continue to monitor GFR and urine output closely for evidence of renal function. Basic metabolic profile daily. 2.  Systemic hypertension - blood pressure control is adequate. 3.  S/p recent left frontal ischemic stroke with right-sided weakness. Continue physical therapy exercises. 4.  Peripheral edema - continue furosemide 80 mg p.o. twice daily. Discussed with RN. Prognosis is guarded.     Electronically signed by Aleksandar Chris MD on 8/17/2021 at 3:10 PM

## 2021-08-17 NOTE — PROGRESS NOTES
Physical Medicine & Rehabilitation  Progress Note      Subjective:      59year-old female with ischemic CVA. Patient is doing well today. She notes continued improvement in her mobility, balance, and strength. She denies any issues with sleep, appetite, bowel, or bladder. ROS:  Denies fevers, chills, sweats. No chest pain, palpitations, lightheadedness. Denies coughing, wheezing or shortness of breath. Denies abdominal pain, nausea, diarrhea or constipation. No new areas of joint pain. Denies new areas of numbness or weakness. Denies new anxiety or depression issues. No new skin problems. Rehabilitation:   Progressing in therapies. PT:  Restrictions/Precautions: Fall Risk  Required Braces or Orthoses  Right Lower Extremity Brace:  (Lymphedema brace )  Left Lower Extremity Brace:  (Lymphedema brace )   Transfers  Sit to Stand: Stand by assistance  Stand to sit: Stand by assistance  Bed to Chair: Stand by assistance  Stand Pivot Transfers: Stand by assistance  Lateral Transfers: Contact guard assistance  Comment: Transfers assessed with rolling walker. Steady, no LOB noted  Ambulation 1  Surface: level tile  Device: Rolling Walker  Assistance: Contact guard assistance  Quality of Gait: Slightly unsteady, no LOB noted. Decrease step length, increased LLE shakiness. Gait Deviations: Slow Nanie, Decreased step height, Decreased step length, Increased ELISABET  Distance: 120ft   Comments: back pain during ambulation causing patient to have multiple standing rest breaks bending over the RW. WC brought up to patient     Transfers  Sit to Stand: Stand by assistance  Stand to sit: Stand by assistance  Bed to Chair: Stand by assistance  Stand Pivot Transfers: Stand by assistance  Lateral Transfers: Contact guard assistance  Comment: Transfers assessed with rolling walker.  Steady, no LOB noted  Ambulation  Ambulation?: Yes  Ambulation 1  Surface: level tile  Device: Rolling Walker  Assistance: Contact guard assistance  Quality of Gait: Slightly unsteady, no LOB noted. Decrease step length, increased LLE shakiness. Gait Deviations: Slow Annie, Decreased step height, Decreased step length, Increased ELISABET  Distance: 120ft   Comments: back pain during ambulation causing patient to have multiple standing rest breaks bending over the RW. WC brought up to patient     Surface: level tile  Ambulation 1  Surface: level tile  Device: Rolling Walker  Assistance: Contact guard assistance  Quality of Gait: Slightly unsteady, no LOB noted. Decrease step length, increased LLE shakiness. Gait Deviations: Slow Annie, Decreased step height, Decreased step length, Increased ELISABET  Distance: 120ft   Comments: back pain during ambulation causing patient to have multiple standing rest breaks bending over the RW. WC brought up to patient     OT:  ADL  Equipment Provided: Reacher, Sock aid  Feeding: Modified independent  (per pt report)  Grooming: Modified independent   UE Bathing: Stand by assistance  LE Bathing: None  UE Dressing: Setup  LE Dressing: Minimal assistance (assist threading BLE while on raised toilet)  Toileting: Minimal assistance (A with LB clothing mgmt 2* R calf bleeding. , otherwise SBA)  Additional Comments: Pt declines all ADLs this morning after hving BM in bed which required a full shower. Reports she already completed oral care as well. Toileting completed in therapy gym. After toilet, pt noted bleeding to R lateral calf. Nurse Elier Wilson notified and present to address and apply bandage. ; PM: Pt toileting completed again.     Instrumental ADL's  Instrumental ADLs: Yes     Balance  Sitting Balance: Supervision  Standing Balance: Contact guard assistance (SBA-CGA)   Standing Balance  Time: AM: 1:48, 10 sec, 30 sec,; PM: 1-2 min X5  Activity: AM: tabletop task, toilet transfer; PM: tabletop tasks  Comment: no LOB, good reaching back for w/c  Functional Mobility  Functional - Mobility Device: Rolling Walker  Activity: Other, To/from bathroom (few feet to table)  Assist Level: Contact guard assistance  Functional Mobility Comments: Verbal cues for hand placement and safety     Bed mobility  Bridging: Stand by assistance  Rolling to Left: Supervision  Rolling to Right: Supervision  Supine to Sit: Supervision  Sit to Supine: Supervision  Scooting: Modified independent  Comment: HOB elevated  Transfers  Sit to stand: Stand by assistance  Stand to sit: Stand by assistance  Transfer Comments: Verbal cues for hand placement and safety   Toilet Transfers  Toilet - Technique: Stand pivot, To right, To left  Equipment Used: Raised toilet seat without rails (grab bar R side)  Toilet Transfer: Contact guard assistance  Toilet Transfers Comments: PM: toilet transfer completed by ambulation with SBA     Shower Transfers  Shower - Transfer To: Transfer tub bench  Shower - Technique: Ambulating  Shower Transfers: Contact Guard  Shower Transfers Comments: Increased time with verbal cues for safety over threshold. SPEECH:  Subjective: [x]? Alert     [x]? Cooperative     []? Confused     []? Agitated    []? Lethargic     Objective/Assessment:  Attention: Functional throughout     Recall: n/a     Problem Solving/Reasoning:  Deductive reasoning- mod to max A    Objective:  BP (!) 133/56   Pulse 72   Temp 97.5 °F (36.4 °C)   Resp 16   Ht 5' 5\" (1.651 m)   Wt 239 lb 3.2 oz (108.5 kg)   SpO2 97%   BMI 39.80 kg/m²       GEN: Well developed, well nourished, in NAD  HEENT:  NCAT. PERRL. EOMI. Mucous membranes pink and moist. R IJ triple lumen in place. PULM:  Clear to ausculation. No rales or rhonchi. Respirations WNL and unlabored. CV:  RRR  No murmurs or gallops. GI:  Abdomen soft. Nontender. Non-distended. BS + and equal.    NEUROLOGICAL: A&O x3. Sensation intact to light touch. MSK:  Functional ROM all extremities. Motor testing 5/5 key muscles LUE and LLE. 4+/5 key muscles RUE and RLE. Cory Reynaga SKIN: Warm dry and intact.  Good turgor. Scattered ecchymosis BUEs. EXTREMITIES:  No calf tenderness to palpation. Chronic stable BLE lymphedema. PSYCH: Mood WNL. Appropriately interactive. Affect WNL. Diagnostics:     CBC:   No results for input(s): WBC, RBC, HGB, HCT, MCV, RDW, PLT in the last 72 hours. BMP:   Recent Labs     08/15/21  0737 08/16/21  0637 08/17/21  0624    137 134*   K 4.6 4.2 4.2   CL 98 99 98   CO2 25 23 24   BUN 45* 57* 56*   CREATININE 3.05* 3.58* 3.49*   GLUCOSE 156* 172* 210*     BNP: No results for input(s): BNP in the last 72 hours. PT/INR:   Recent Labs     08/16/21  1103   PROTIME 17.1*   INR 1.4     APTT:   Recent Labs     08/16/21  1103   APTT 42.4*     CARDIAC ENZYMES: No results for input(s): CKMB, CKMBINDEX, TROPONINT in the last 72 hours. Invalid input(s): CKTOTAL;3  FASTING LIPID PANEL:  Lab Results   Component Value Date    CHOL 105 04/09/2015    HDL 43 04/09/2015    TRIG 168 04/09/2015     LIVER PROFILE: No results for input(s): AST, ALT, ALB, BILIDIR, BILITOT, ALKPHOS in the last 72 hours.      Current Medications:   Current Facility-Administered Medications: insulin glargine (LANTUS) injection vial 53 Units, 53 Units, Subcutaneous, BID  sodium citrate 4 % injection 1.3 mL, 1.3 mL, Intracatheter, PRN  sodium citrate 4 % injection 1.3 mL, 1.3 mL, Intracatheter, PRN  traZODone (DESYREL) tablet 100 mg, 100 mg, Oral, Nightly  sodium chloride (OCEAN, BABY AYR) 0.65 % nasal spray 1 spray, 1 spray, Each Nostril, PRN  acetaminophen (TYLENOL) tablet 650 mg, 650 mg, Oral, Q4H PRN  polyethylene glycol (GLYCOLAX) packet 17 g, 17 g, Oral, Daily  senna (SENOKOT) tablet 17.2 mg, 2 tablet, Oral, Daily PRN  bisacodyl (DULCOLAX) suppository 10 mg, 10 mg, Rectal, Daily PRN  febuxostat (ULORIC) tablet 40 mg, 40 mg, Oral, Daily  tamsulosin (FLOMAX) capsule 0.4 mg, 0.4 mg, Oral, Daily  gabapentin (NEURONTIN) capsule 300 mg, 300 mg, Oral, BID  furosemide (LASIX) tablet 80 mg, 80 mg, Oral, BID  pantoprazole MD [email or web address] on 8/17/2021 at 9:42 AM      This note is created with the assistance of a speech recognition program.  While intending to generate a document that actually reflects the content of the visit, the document can still have some errors including those of syntax and sound a like substitutions which may escape proof reading. In such instances, actual meaning can be extrapolated by contextual diversion.

## 2021-08-17 NOTE — PROGRESS NOTES
[address]   ACUTE REHABILITATION OCCUPATIONAL THERAPY  DAILY NOTE    Date: 21  Patient Name: Robert Frost      Room: 3741/6213-83    MRN: [id number]   : 1960  (59 y.o.)  Gender: female   Referring Practitioner: Gloria Garcia MD  Diagnosis: CVA  Additional Pertinent Hx: Presented with acute right arm weakness and mental status change. MRI brain showed probable acute to subacute lacunar infarct of the left frontal lobe. AURELIA on CKD stage 4 likely d/t contrast induced nephropathy. Dialysis was initiated on 21, had 2 treatment session, labs improving. Pt. has history of diabetes mellitus type II, CAD s/p CABG (), cervical stenosis, back pain, lymphedema, diastolic CHF, DVT, urinary retention, and iron deficiency anemia. Pt. admitted to ARU from [place] [id number] 21.      Restrictions  Restrictions/Precautions: Fall Risk  Required Braces or Orthoses  Right Lower Extremity Brace:  (Lymphedema brace )  Left Lower Extremity Brace:  (Lymphedema brace )  Required Braces or Orthoses?: Yes (B lymphedema wraps)    Subjective  Subjective: Pt reports \"I really need to work on my balance and my strength\"   Comments: Pt is pleasant and motivated  Patient Currently in Pain: Yes  Pain Level: 8  Restrictions/Precautions: Fall Risk  Overall Orientation Status: Within Functional Limits     Pain Assessment  Pain Assessment: 0-10  Pain Level: 8  Pain Type: Chronic pain  Pain Location: Back, Neck    Objective     Balance  Sitting Balance: Modified independent   Standing Balance: Stand by assistance  Transfers  Sit to stand: Stand by assistance  Stand to sit: Stand by assistance     Functional Mobility  Functional - Mobility Device: Rolling Walker  Activity: To/from bathroom  Assist Level: Stand by assistance  Toilet Transfers  Toilet - Technique: Ambulating  Equipment Used: Raised toilet seat without rails  Toilet Transfer: Stand by assistance  Toilet Transfers Comments: grab rail   Shower Transfers  Shower - Transfer From: Jefferson Guzmán - Transfer Type: To and From  Shower - Transfer To: Transfer tub bench  Shower - Technique: Ambulating  Shower Transfers: Contact Guard     Type of ROM/Therapeutic Exercise  Type of ROM/Therapeutic Exercise: Free weights  Comment: Pt completed BUE exercises with use of 2# free weight for 10-15 reps. Pt required rest breaks as needed due to fatige. Pt reports decreased strength in RUE compared to LUE when completing task. Pt completed activity to increase strength and overall endurance to increase safety and independence with ADL and IADL tasks. Exercises  Scapular Protraction: x  Scapular Retraction: x  Shoulder Flexion: x  Shoulder Extension: x  Horizontal ABduction: x  Horizontal ADduction: x  Elbow Flexion: x  Elbow Extension: x  Wrist Flexion: x  Wrist Extension: x  Grasp/Release: spring gripper 2nd setting x20 B hands. Other: red theragripper bar forward/upward bends, twisting x15                       ADL  Equipment Provided: Reacher;Sock aid  Feeding: Modified independent   Grooming: Modified independent ; Increased time to complete (for blow drying hair this date due to fatigue. )  UE Bathing: Setup  LE Bathing: Minimal assistance (Assist with buttocks)  UE Dressing: Setup (SBA-CGA with set up )  LE Dressing: Minimal assistance (assist with donning lymphedema wraps, educ use of reacher. )  Toileting: Minimal assistance (Assist with hygiene throughness. )          Assessment  Performance deficits / Impairments: Decreased ADL status; Decreased functional mobility ; Decreased strength;Decreased safe awareness;Decreased cognition;Decreased endurance;Decreased balance;Decreased high-level IADLs;Decreased coordination  Prognosis: Good  Discharge Recommendations: Patient would benefit from continued therapy after discharge;Home with assist PRN  Activity Tolerance: Patient Tolerated treatment well  Safety Devices in place: Yes  Type of devices: Left in chair;Call Long term goal 5: Pt will verbalize/demonstrate good understanding of home safety/fall prevention to increase safety and independence with self care tasks.    Long term goals 6: Pt will demonstrated increased  strength and coordination during self care tasks as evident by and an improvement on the 9 hole peg test by 3 seconds and increased  strength by 5#        08/17/21 1609 08/17/21 1610   OT Individual Minutes   Time In [id number]   Time Out 1149 1330   Minutes 64 25     Electronically signed by DAYANA Latham on 8/17/21 at 4:27 PM EDT

## 2021-08-17 NOTE — PROGRESS NOTES
Physical Therapy  Facility/Department: [facility] ACUTE REHAB  Daily Treatment Note  NAME: Loly Matos  : 1960  MRN: [id number]    Date of Service: 2021    Discharge Recommendations:  Patient would benefit from continued therapy after discharge, Home with assist PRN   PT Equipment Recommendations  Equipment Needed: No    Assessment   Body structures, Functions, Activity limitations: Decreased functional mobility ; Decreased strength;Decreased endurance;Decreased balance; Increased pain  Treatment Diagnosis: CVA  Specific instructions for Next Treatment: Balance training, transfer training, gait training  REQUIRES PT FOLLOW UP: Yes  Activity Tolerance  Activity Tolerance: Patient limited by endurance; Patient limited by fatigue;Patient limited by pain     Patient Diagnosis(es): There were no encounter diagnoses. has a past medical history of Backache, unspecified, CHF (congestive heart failure) ([facility] [address] Chronic kidney disease, Coronary atherosclerosis of artery bypass graft, Cramp of limb, Gallstones, Hypertension, Insomnia, Pneumonia, Type II or unspecified type diabetes mellitus with renal manifestations, not stated as uncontrolled(250.40), Type II or unspecified type diabetes mellitus without mention of complication, not stated as uncontrolled, and Unspecified vitamin D deficiency. has a past surgical history that includes Coronary artery bypass graft; Knee arthroscopy; Carpal tunnel release; Breast surgery; Tonsillectomy; Hand surgery; Ankle fracture surgery; and Cholecystectomy, open (N/A). Restrictions  Restrictions/Precautions  Restrictions/Precautions: Fall Risk  Required Braces or Orthoses?: Yes (B lymphedema wraps)  Required Braces or Orthoses  Right Lower Extremity Brace:  (Lymphedema brace )  Left Lower Extremity Brace:  (Lymphedema brace )  Subjective   General  Chart Reviewed: Yes  Additional Pertinent Hx: Presented with acute right arm weakness and mental status change.   MRI brain showed probable acute to subacute lacunar infarct of the left frontal lobe. AURELIA on CKD stage 4 likely d/t contrast induced nephropathy. Dialysis was initiated on 8/6/21, had 2 treatment session, labs improving. Pt. has history of diabetes mellitus type II, CAD s/p CABG (2005), cervical stenosis, back pain, lymphedema, diastolic CHF, DVT, urinary retention, and iron deficiency anemia. Pt. admitted to ARU from [place] [address] 8/11/21. Response To Previous Treatment: Patient with no complaints from previous session. Family / Caregiver Present: No  Referring Practitioner: Dr. Liliana Brito: Patient eager and agreeable for therapy; stated she slept well throughout ther night   Pain Screening  Patient Currently in Pain: Yes  Pain Assessment  Pain Assessment: 0-10  Pain Level: 8  Pain Type: Chronic pain  Pain Location: Back  Pain Orientation: Lower  Vital Signs  Patient Currently in Pain: Yes       Orientation  Orientation  Overall Orientation Status: Within Normal Limits  Objective      Transfers  Sit to Stand: Stand by assistance  Stand to sit: Stand by assistance  Bed to Chair: Stand by assistance  Stand Pivot Transfers: Stand by assistance  Comment: Transfers assessed with rolling walker. Steady, no LOB noted  Ambulation  Ambulation?: Yes  Ambulation 1  Surface: level tile  Device: Rollator  Assistance: Stand by assistance  Quality of Gait: Slightly unsteady, no LOB noted. Decrease step length, increased LLE shakiness. Gait Deviations: Slow Annie;Decreased step height;Decreased step length; Increased ELISABET  Distance: 100ft x2  Comments: seated rest break in between distances on rollator. Patient demonstarted correct technique for locking and utilizing the breaks on the rollator   Ambulation 2  Surface - 2: level tile;ramp  Device 2: Rollator  Assistance 2: Stand by assistance  Quality of Gait 2: Slightly unsteady, no LOB noted. Decrease step length, increased LLE shakiness.   Gait Deviations: Slow Annie;Decreased step height;Decreased step length; Increased ELISABET  Distance: 100ft, 75ft, 108ft   Comments: seated rest breaks on rollator between distances d/t increased back pain   Stairs/Curb  Stairs?: Yes  Stairs  # Steps : 10  Stairs Height:  (4\"/6\")  Rails: Bilateral  Device: No Device  Assistance: Supervision  Comment: patient performs step to pattern        Other exercises  Other exercises?: Yes  Other exercises 1: Seated (B) LE exercises x20 (2#, lime green theraband)  Other exercises 2: seated UBE 5 minutes FWD/BWD   Other exercises 3: standing B LE exercises x15-20 reps performed in // bars. Patient had a \"tweak\" in her back during exercises causing increased back pain and causing the patient to tear up. After seated rest break, patient was able to continue with exercises   Other exercises 4: NuStep L4 x10 minutes   Other exercises 5: [facility] hand held assist ~ 5ft no LOB noted          Comment: rest breaks PRN. Goals  Short term goals  Time Frame for Short term goals: 5 days   Short term goal 1: Pt. to perform bed mobility independently. Short term goal 2: Pt. to tolerate 30 to 45 minutes of therapeutic exercise to improve strength and endurance for increased functional mobility. Short term goal 3: Pt. to improve seated static and dynamic balance to good. Short term goal 4: Pt. to improve standing static balance to fair+ and standing dynamic balance to fair. Short term goal 5: Pt. to ambulate 200ft. with supervision using a rollator. Short term goal 6: Pt. to ascend/descend 3 steps with CGA using one railing  Long term goals  Time Frame for Long term goals : By DC  Long term goal 1: Pt. to perform all transfers independently or with Mod-I. Long term goal 2: Pt. to improve standing static and dynamic balance to good. Long term goal 3: Pt. to ambulate 100ft. on an unlevel/ inclined surface with Mod-I using a rollator.    Long term goal 4: Pt. to ascend/descend one flight of stairs with supervision   Long term goal 5: Pt. to ambulate 200ft. in 2 minutes with Mod-I using a rollator. Long term goal 6: Pt. to improve PASS score from 24/36 to 35/36. Patient Goals   Patient goals : Improve balance and strength to be able to walk independently with rollator. Plan    Plan  Times per week: 1.5hrs/day  Times per day: Daily  Specific instructions for Next Treatment: Balance training, transfer training, gait training  Current Treatment Recommendations: Strengthening, Balance Training, Transfer Training, Functional Mobility Training, ROM, Endurance Training, Gait Training, Stair training, Pain Management, Home Exercise Program, Safety Education & Training, Neuromuscular Re-education, Patient/Caregiver Education & Training, Equipment Evaluation, Education, & procurement  Safety Devices  Type of devices:  All fall risk precautions in place, Call light within reach, Gait belt, Patient at risk for falls, Nurse notified        08/17/21 1106 08/17/21 1508   PT Individual Minutes   Time In 0930 1229   Time Out [id number]   Minutes 64 34     Electronically signed by Jordan Yanes PTA on 8/17/21 at 3:09 PM EDT

## 2021-08-18 ENCOUNTER — APPOINTMENT (OUTPATIENT)
Dept: INTERVENTIONAL RADIOLOGY/VASCULAR | Age: 61
DRG: 056 | End: 2021-08-18
Attending: PHYSICAL MEDICINE & REHABILITATION
Payer: COMMERCIAL

## 2021-08-18 LAB
ANION GAP SERPL CALCULATED.3IONS-SCNC: 15 MMOL/L (ref 9–17)
BUN BLDV-MCNC: 61 MG/DL (ref 8–23)
BUN/CREAT BLD: ABNORMAL (ref 9–20)
CALCIUM SERPL-MCNC: 9.3 MG/DL (ref 8.6–10.4)
CHLORIDE BLD-SCNC: 101 MMOL/L (ref 98–107)
CO2: 22 MMOL/L (ref 20–31)
CREAT SERPL-MCNC: 3.35 MG/DL (ref 0.5–0.9)
GFR AFRICAN AMERICAN: 17 ML/MIN
GFR NON-AFRICAN AMERICAN: 14 ML/MIN
GFR SERPL CREATININE-BSD FRML MDRD: ABNORMAL ML/MIN/{1.73_M2}
GFR SERPL CREATININE-BSD FRML MDRD: ABNORMAL ML/MIN/{1.73_M2}
GLUCOSE BLD-MCNC: 184 MG/DL (ref 65–105)
GLUCOSE BLD-MCNC: 83 MG/DL (ref 65–105)
GLUCOSE BLD-MCNC: 84 MG/DL (ref 70–99)
GLUCOSE BLD-MCNC: 87 MG/DL (ref 65–105)
GLUCOSE BLD-MCNC: 97 MG/DL (ref 65–105)
POTASSIUM SERPL-SCNC: 4.2 MMOL/L (ref 3.7–5.3)
SODIUM BLD-SCNC: 138 MMOL/L (ref 135–144)

## 2021-08-18 PROCEDURE — 0JH63XZ INSERTION OF TUNNELED VASCULAR ACCESS DEVICE INTO CHEST SUBCUTANEOUS TISSUE AND FASCIA, PERCUTANEOUS APPROACH: ICD-10-PCS | Performed by: PHYSICAL MEDICINE & REHABILITATION

## 2021-08-18 PROCEDURE — 77001 FLUOROGUIDE FOR VEIN DEVICE: CPT

## 2021-08-18 PROCEDURE — 36415 COLL VENOUS BLD VENIPUNCTURE: CPT

## 2021-08-18 PROCEDURE — 97116 GAIT TRAINING THERAPY: CPT

## 2021-08-18 PROCEDURE — 80048 BASIC METABOLIC PNL TOTAL CA: CPT

## 2021-08-18 PROCEDURE — 2709999900 IR TUNNELED CVC PLACE WO SQ PORT/PUMP > 5 YEARS

## 2021-08-18 PROCEDURE — 6360000002 HC RX W HCPCS: Performed by: RADIOLOGY

## 2021-08-18 PROCEDURE — 6370000000 HC RX 637 (ALT 250 FOR IP): Performed by: INTERNAL MEDICINE

## 2021-08-18 PROCEDURE — 1180000000 HC REHAB R&B

## 2021-08-18 PROCEDURE — 2580000003 HC RX 258: Performed by: RADIOLOGY

## 2021-08-18 PROCEDURE — 99231 SBSQ HOSP IP/OBS SF/LOW 25: CPT | Performed by: PHYSICAL MEDICINE & REHABILITATION

## 2021-08-18 PROCEDURE — 97110 THERAPEUTIC EXERCISES: CPT

## 2021-08-18 PROCEDURE — 97535 SELF CARE MNGMENT TRAINING: CPT

## 2021-08-18 PROCEDURE — 6370000000 HC RX 637 (ALT 250 FOR IP): Performed by: PHYSICAL MEDICINE & REHABILITATION

## 2021-08-18 PROCEDURE — 97530 THERAPEUTIC ACTIVITIES: CPT

## 2021-08-18 PROCEDURE — 99232 SBSQ HOSP IP/OBS MODERATE 35: CPT | Performed by: INTERNAL MEDICINE

## 2021-08-18 PROCEDURE — 2500000003 HC RX 250 WO HCPCS: Performed by: INTERNAL MEDICINE

## 2021-08-18 PROCEDURE — 2580000003 HC RX 258: Performed by: PHYSICAL MEDICINE & REHABILITATION

## 2021-08-18 PROCEDURE — 97130 THER IVNTJ EA ADDL 15 MIN: CPT

## 2021-08-18 PROCEDURE — 76937 US GUIDE VASCULAR ACCESS: CPT

## 2021-08-18 PROCEDURE — 90935 HEMODIALYSIS ONE EVALUATION: CPT

## 2021-08-18 PROCEDURE — 02H633Z INSERTION OF INFUSION DEVICE INTO RIGHT ATRIUM, PERCUTANEOUS APPROACH: ICD-10-PCS | Performed by: PHYSICAL MEDICINE & REHABILITATION

## 2021-08-18 PROCEDURE — 82947 ASSAY GLUCOSE BLOOD QUANT: CPT

## 2021-08-18 PROCEDURE — 36558 INSERT TUNNELED CV CATH: CPT

## 2021-08-18 PROCEDURE — 97129 THER IVNTJ 1ST 15 MIN: CPT

## 2021-08-18 RX ORDER — HEPARIN SODIUM 5000 [USP'U]/ML
INJECTION, SOLUTION INTRAVENOUS; SUBCUTANEOUS
Status: COMPLETED | OUTPATIENT
Start: 2021-08-18 | End: 2021-08-18

## 2021-08-18 RX ORDER — FENTANYL CITRATE 50 UG/ML
INJECTION, SOLUTION INTRAMUSCULAR; INTRAVENOUS
Status: COMPLETED | OUTPATIENT
Start: 2021-08-18 | End: 2021-08-18

## 2021-08-18 RX ORDER — MIDAZOLAM HYDROCHLORIDE 1 MG/ML
INJECTION INTRAMUSCULAR; INTRAVENOUS
Status: COMPLETED | OUTPATIENT
Start: 2021-08-18 | End: 2021-08-18

## 2021-08-18 RX ORDER — 0.9 % SODIUM CHLORIDE 0.9 %
250 INTRAVENOUS SOLUTION INTRAVENOUS ONCE
Status: COMPLETED | OUTPATIENT
Start: 2021-08-18 | End: 2021-08-18

## 2021-08-18 RX ADMIN — TAMSULOSIN HYDROCHLORIDE 0.4 MG: 0.4 CAPSULE ORAL at 13:35

## 2021-08-18 RX ADMIN — APIXABAN 5 MG: 5 TABLET, FILM COATED ORAL at 13:32

## 2021-08-18 RX ADMIN — CARVEDILOL 6.25 MG: 6.25 TABLET, FILM COATED ORAL at 18:28

## 2021-08-18 RX ADMIN — MIDAZOLAM 0.5 MG: 1 INJECTION INTRAMUSCULAR; INTRAVENOUS at 11:43

## 2021-08-18 RX ADMIN — TRAZODONE HYDROCHLORIDE 100 MG: 100 TABLET ORAL at 21:29

## 2021-08-18 RX ADMIN — CEFAZOLIN 1000 MG: 1 INJECTION, POWDER, FOR SOLUTION INTRAMUSCULAR; INTRAVENOUS at 11:13

## 2021-08-18 RX ADMIN — SODIUM CHLORIDE 250 ML: 9 INJECTION, SOLUTION INTRAVENOUS at 11:21

## 2021-08-18 RX ADMIN — INSULIN LISPRO 1 UNITS: 100 INJECTION, SOLUTION INTRAVENOUS; SUBCUTANEOUS at 21:30

## 2021-08-18 RX ADMIN — POLYETHYLENE GLYCOL 3350 17 G: 17 POWDER, FOR SOLUTION ORAL at 13:35

## 2021-08-18 RX ADMIN — APIXABAN 5 MG: 5 TABLET, FILM COATED ORAL at 21:29

## 2021-08-18 RX ADMIN — FUROSEMIDE 80 MG: 40 TABLET ORAL at 18:12

## 2021-08-18 RX ADMIN — ASPIRIN 81 MG: 81 TABLET, CHEWABLE ORAL at 13:32

## 2021-08-18 RX ADMIN — ACETAMINOPHEN 650 MG: 325 TABLET, FILM COATED ORAL at 18:13

## 2021-08-18 RX ADMIN — INSULIN GLARGINE 53 UNITS: 100 INJECTION, SOLUTION SUBCUTANEOUS at 21:30

## 2021-08-18 RX ADMIN — Medication 1.3 ML: at 17:36

## 2021-08-18 RX ADMIN — Medication 1.3 ML: at 17:35

## 2021-08-18 RX ADMIN — FEBUXOSTAT 40 MG: 40 TABLET, FILM COATED ORAL at 13:34

## 2021-08-18 RX ADMIN — FENTANYL CITRATE 25 MCG: 50 INJECTION, SOLUTION INTRAMUSCULAR; INTRAVENOUS at 11:32

## 2021-08-18 RX ADMIN — FENTANYL CITRATE 25 MCG: 50 INJECTION, SOLUTION INTRAMUSCULAR; INTRAVENOUS at 11:43

## 2021-08-18 RX ADMIN — INSULIN GLARGINE 53 UNITS: 100 INJECTION, SOLUTION SUBCUTANEOUS at 13:34

## 2021-08-18 RX ADMIN — GABAPENTIN 300 MG: 300 CAPSULE ORAL at 13:32

## 2021-08-18 RX ADMIN — ATORVASTATIN CALCIUM 80 MG: 80 TABLET, FILM COATED ORAL at 21:29

## 2021-08-18 RX ADMIN — RANOLAZINE 1000 MG: 1000 TABLET, FILM COATED, EXTENDED RELEASE ORAL at 13:34

## 2021-08-18 RX ADMIN — MIDAZOLAM 0.5 MG: 1 INJECTION INTRAMUSCULAR; INTRAVENOUS at 11:32

## 2021-08-18 RX ADMIN — HEPARIN SODIUM 1.9 ML: 5000 INJECTION INTRAVENOUS; SUBCUTANEOUS at 11:58

## 2021-08-18 RX ADMIN — BUSPIRONE HYDROCHLORIDE 7.5 MG: 7.5 TABLET ORAL at 21:29

## 2021-08-18 RX ADMIN — BUSPIRONE HYDROCHLORIDE 7.5 MG: 7.5 TABLET ORAL at 13:33

## 2021-08-18 RX ADMIN — HEPARIN SODIUM 1.9 ML: 5000 INJECTION INTRAVENOUS; SUBCUTANEOUS at 11:57

## 2021-08-18 RX ADMIN — RANOLAZINE 1000 MG: 1000 TABLET, FILM COATED, EXTENDED RELEASE ORAL at 21:29

## 2021-08-18 RX ADMIN — GABAPENTIN 300 MG: 300 CAPSULE ORAL at 21:29

## 2021-08-18 ASSESSMENT — PAIN SCALES - GENERAL
PAINLEVEL_OUTOF10: 5
PAINLEVEL_OUTOF10: 5
PAINLEVEL_OUTOF10: 0
PAINLEVEL_OUTOF10: 8
PAINLEVEL_OUTOF10: 0

## 2021-08-18 ASSESSMENT — PAIN DESCRIPTION - ORIENTATION: ORIENTATION: LOWER

## 2021-08-18 ASSESSMENT — PAIN DESCRIPTION - PAIN TYPE: TYPE: CHRONIC PAIN

## 2021-08-18 ASSESSMENT — PAIN DESCRIPTION - LOCATION: LOCATION: BACK

## 2021-08-18 NOTE — PROGRESS NOTES
NEPHROLOGY PROGRESS NOTE    Patient :  Qasim Mathews; 59 y.o. MRN# [id number]  Location:  2617/2617-01  Attending:  Sharon Cotton MD  Admit Date:  8/11/2021   Hospital Day: 7    Reason for consultation: Management of acute kidney injury superimposed on chronic kidney disease stage IV. Consulting physician: Tiarra Varma MD.    Interval history: Patient was seen and examined today and she does not have shortness of breath at rest.Patient is seen on dialysis-Had a right IJ tunneled catheter placed today. Patient denies abdominal pain, nausea or vomiting. History of Present Illness: This is a 59 y.o. female with past medical history of longstanding type 2 diabetes insulin-dependent diabetes mellitus, essential hypertension, coronary artery disease status post CABG in 2004, coronary artery stent in 2019, CHF with EF of 55%, history of mild to moderate LVH diastolic dysfunction, CKD 4 with baseline creatinine of about 2.2 to 2.4 mg/dL, patient initially presented to [facility] on 8/1/2021 with right-sided weakness, sudden onset patient was diagnosed with acute/subacute stroke in left frontal lobe with right-sided weakness, patient had CT angiogram during that. And developed acute kidney injury due to contrast-induced nephropathy requiring dialysis serum creatinine peaked to 4.9 mg/dL and patient was started on dialysis on 6 August patient had dialysis on 7 August, and dialysis was held follow kidney function renal recovery and subsequently patient was transferred to acute rehab on 10 August.  Nephrology is consulted for ongoing management of  acute kidney injury on CKD. Patient is feeling better weakness slightly improved patient still have peripheral edema patient is on diuretics most recent serum creatinine is 2.9 mg/dL, BUN 57  Urine output is not measured.   During hospital stay at [facility] patient did have urinary retention and had Pantoja catheter which was removed 2 days ago.  Denies any urinary complaints. Medication review showed use of  ARB's and diuretics.     Current Medications:    insulin glargine (LANTUS) injection vial 53 Units, BID  sodium citrate 4 % injection 1.3 mL, PRN  sodium citrate 4 % injection 1.3 mL, PRN  traZODone (DESYREL) tablet 100 mg, Nightly  sodium chloride (OCEAN, BABY AYR) 0.65 % nasal spray 1 spray, PRN  acetaminophen (TYLENOL) tablet 650 mg, Q4H PRN  polyethylene glycol (GLYCOLAX) packet 17 g, Daily  senna (SENOKOT) tablet 17.2 mg, Daily PRN  bisacodyl (DULCOLAX) suppository 10 mg, Daily PRN  febuxostat (ULORIC) tablet 40 mg, Daily  tamsulosin (FLOMAX) capsule 0.4 mg, Daily  gabapentin (NEURONTIN) capsule 300 mg, BID  furosemide (LASIX) tablet 80 mg, BID  pantoprazole (PROTONIX) tablet 40 mg, QAM AC  ranolazine (RANEXA) extended release tablet 1,000 mg, BID  insulin lispro (HUMALOG) injection vial 0-12 Units, TID WC  insulin lispro (HUMALOG) injection vial 0-6 Units, Nightly  glucose (GLUTOSE) 40 % oral gel 15 g, PRN  dextrose 50 % IV solution, PRN  glucagon (rDNA) injection 1 mg, PRN  dextrose 5 % solution, PRN  apixaban (ELIQUIS) tablet 5 mg, BID  aspirin chewable tablet 81 mg, Daily  atorvastatin (LIPITOR) tablet 80 mg, Nightly  busPIRone (BUSPAR) tablet 7.5 mg, TID  carvedilol (COREG) tablet 6.25 mg, BID WC  ferrous sulfate (IRON 325) tablet 325 mg, BID WC      Objective:  CURRENT TEMPERATURE:  Temp: 97.4 °F (36.3 °C)  MAXIMUM TEMPERATURE OVER 24HRS:  Temp (24hrs), Av.4 °F (36.3 °C), Min:97.4 °F (36.3 °C), Max:97.5 °F (36.4 °C)    CURRENT RESPIRATORY RATE:  Resp: 12  CURRENT PULSE:  Pulse: 67  CURRENT BLOOD PRESSURE:  BP: 122/62  24HR BLOOD PRESSURE RANGE:  Systolic (98JUC), CZM:874 , Min:120 , BRN:723   ; Diastolic (86YCA), PWA:12, Min:54, Max:76    24HR INTAKE/OUTPUT:    No intake or output data in the 24 hours ending 21 1452  Patient Vitals for the past 96 hrs (Last 3 readings):   Weight   21 1415 240 lb 4.8 oz (109 kg) Physical Exam:  GENERAL APPEARANCE: Alert and cooperative, and appears to be in no acute distress. HEAD: normocephalic  CARDIAC: Normal S1 and S2. No S3, S4 or murmurs. Rhythm is regular. LUNGS: Clear to auscultation and percussion without rales, rhonchi, wheezing or diminished breath sounds. ABDOMEN: Soft with normal bowel sounds; no palpable organomegaly. MUSKULOSKELETAL: Adequately aligned spine. No joint erythema or tenderness. EXTREMITIES: 2+ bilateral lower extremity edema. Peripheral pulses intact. NEURO: Right-sided weakness    Labs:    BMP:   Recent Labs     08/16/21  0637 08/17/21  0624 08/18/21  0702    134* 138   K 4.2 4.2 4.2   CL 99 98 101   CO2 23 24 22   BUN 57* 56* 61*   CREATININE 3.58* 3.49* 3.35*   GLUCOSE 172* 210* 84   CALCIUM 9.1 9.1 9.3      Assessment/plan:    1. Acute kidney injury superimposed on chronic kidney disease stage IV differentials include contrast mediated acute tubular necrosis secondary to recent CT angiogram at [facility] and progression of underlying chronic kidney disease. She started acute hemodialysis at [facility] on 8/6/2021. Patient has ongoing indications for intermittent hemodialysis . Continue  Monday/wedensday/friday schedule  S/P tunneled hemodialysis catheter placed on 8/18/21  Continue to monitor GFR and urine output closely for evidence of renal function. Basic metabolic profile daily. Plan 3 litre fluid removal with HD today. 2.  Systemic hypertension - blood pressure control is adequate. 3.  S/p recent left frontal ischemic stroke with right-sided weakness. Continue physical therapy exercises. 4.  Peripheral edema - continue furosemide 80 mg p.o. twice daily. Prognosis is guarded.     Electronically signed by Danielle Silverman MD on 8/18/2021 at 2:52 PM

## 2021-08-18 NOTE — PROGRESS NOTES
HEMODIALYSIS POST TREATMENT NOTE    Treatment time ordered: 3.5 Hours    Actual treatment time: 3.5 Hours    UltraFiltration Goal: 0024-5870 ml as tolerated  UltraFiltration Removed: 3000 ml      Pre Treatment weight: 109 kg  Post Treatment weight: 106 kg  Estimated Dry Weight:     Access used:     Central Venous Catheter:          Tunneled or Non-tunneled: Tunneled           Site: Right Chest          Access Flow: Good      Internal Access:       AV Fistula or AV Graft:          Site:        Access Flow:        Sign and symptoms of infection:        If YES:     Medications or blood products given: None    Chronic outpatient schedule: MWF    Chronic outpatient unit: TBD, [facility]    Summary of response to treatment: Treatment ran uneventful. Patient tolerated well without need for interventions. Target fluid goal met with no discomfort reported. Vitals stable. Patient awake and ready to return to room. Explain if orders NOT met, was physician notified:      Vanita Garduno faxed to patient unit/ placed in patient chart: Yes    Post assessment completed: Yes    Report given to: Renny Spence RN      * Intra-treatment documented Safety Checks include the followin) Access and face visible at all times. 2) All connections and blood lines are secure with no kinks. 3) NVL alarm engaged. 4) Hemosafe device applied (if applicable). 5) No collapse of Arterial or Venous blood chambers. 6) All blood lines / pump segments in the air detectors.

## 2021-08-18 NOTE — PROGRESS NOTES
HEMODIALYSIS PRE-TREATMENT NOTE    Patient Identifiers prior to treatment: Name//MRN    Isolation Required:  Yes                      Isolation Type: Contact       (please document if patient is being managed as a PUI/COVID-19 patient)        Hepatitis status:                           Date Drawn                             Result  Hepatitis B Surface Antigen 2021     neg                     Hepatitis B Surface Antibody 2021 pos        Hepatitis B Core Antibody 2021 neg          How was Hepatitis Status verified: epic chart     Was a copy of the labs you documented provided to facility for the patient's chart: yes    Hemodialysis orders verified: yes    Access Within normal limits ( I.e. s/s of infection,...): yes     Pre-Assessment completed: yes by Lisette Pleitez RN    Pre-dialysis report received from: Jackson To                      Time: 13:57

## 2021-08-18 NOTE — PRE SEDATION
Sedation Pre-Procedure Note    Patient Name: Ag Maria   YOB: 1960  Room/Bed: 2617/2617-01  Medical Record Number: [id number]  Date: 8/18/2021   Time: 11:30 AM       Indication:  CKD needing dialysis    Consent: I have discussed with the patient and/or the patient representative the indication, alternatives, and the possible risks and/or complications of the planned procedure and the anesthesia methods. The patient and/or patient representative appear to understand and agree to proceed. Vital Signs:   Vitals:    08/18/21 1124   BP: (!) 161/76   Pulse: 66   Resp: 12   Temp:    SpO2: 100%       Past Medical History:   has a past medical history of Backache, unspecified, CHF (congestive heart failure) ([address] Chronic kidney disease, Coronary atherosclerosis of artery bypass graft, Cramp of limb, Gallstones, Hypertension, Insomnia, Pneumonia, Type II or unspecified type diabetes mellitus with renal manifestations, not stated as uncontrolled(250.40), Type II or unspecified type diabetes mellitus without mention of complication, not stated as uncontrolled, and Unspecified vitamin D deficiency. Past Surgical History:   has a past surgical history that includes Coronary artery bypass graft; Knee arthroscopy; Carpal tunnel release; Breast surgery; Tonsillectomy; Hand surgery; Ankle fracture surgery; and Cholecystectomy, open (N/A).     Medications:   Scheduled Meds:    ceFAZolin  1,000 mg Intravenous Once    insulin glargine  53 Units Subcutaneous BID    traZODone  100 mg Oral Nightly    polyethylene glycol  17 g Oral Daily    febuxostat  40 mg Oral Daily    tamsulosin  0.4 mg Oral Daily    gabapentin  300 mg Oral BID    furosemide  80 mg Oral BID    pantoprazole  40 mg Oral QAM AC    ranolazine  1,000 mg Oral BID    insulin lispro  0-12 Units Subcutaneous TID WC    insulin lispro  0-6 Units Subcutaneous Nightly    apixaban  5 mg Oral BID    aspirin  81 mg Oral Daily    atorvastatin  80 mg Oral Nightly    busPIRone  7.5 mg Oral TID    carvedilol  6.25 mg Oral BID WC    ferrous sulfate  325 mg Oral BID WC     Continuous Infusions:    dextrose       PRN Meds: sodium citrate, sodium citrate, sodium chloride, acetaminophen, senna, bisacodyl, glucose, dextrose, glucagon (rDNA), dextrose  Home Meds:   Prior to Admission medications    Medication Sig Start Date End Date Taking? Authorizing Provider   busPIRone (BUSPAR) 7.5 MG tablet TAKE 1 TABLET BY MOUTH THREE TIMES DAILY 7/1/21   Historical Provider, MD   gabapentin (NEURONTIN) 300 MG capsule TAKE 1 CAPSULE BY MOUTH TWICE DAILY 6/21/21 7/23/21  AFSANEH Fisher CNP   ELIQUIS 5 MG TABS tablet  6/5/21   Historical Provider, MD   blood glucose test strips (DEN CONTOUR TEST) strip 1 each by In Vitro route 3 times daily As needed. 4/30/21   AFSANEH Fisher CNP   Dulaglutide (TRULICITY) 1.5 DG/5.3JH SOPN 1.5 mg    Historical Provider, MD   insulin glargine (BASAGLAR KWIKPEN) 100 UNIT/ML injection pen Basaglar KwikPen U-100 Insulin 100 unit/mL (3 mL) subcutaneous   Inject 43 units twice a day by subcutaneous route as directed for 90 days.     Historical Provider, MD   insulin lispro, 1 Unit Dial, (HUMALOG KWIKPEN) 100 UNIT/ML SOPN Humalog KwikPen (U-100) Insulin 100 unit/mL subcutaneous   15 units with each meal plus 2-10 units as SS if BS>150    Historical Provider, MD   Biotin w/ Vitamins C & E (HAIR/SKIN/NAILS PO) Take 200 mg by mouth daily    Historical Provider, MD   allopurinol (ZYLOPRIM) 100 MG tablet Take 100 mg by mouth daily    Historical Provider, MD   sharps container 1 each by Does not apply route as needed (dirty pen needles) 4/19/21   AFSANEH Fisher CNP   ferrous sulfate (BRIAN-ARCHIE) 325 (65 Fe) MG tablet Take 1 tablet by mouth daily 4/19/21   AFSANEH Fisher CNP   allopurinol (ZYLOPRIM) 100 MG tablet Take 1 tablet by mouth daily 4/14/21   AFSANEH Fisher CNP   isosorbide mononitrate (IMDUR) 30 MG extended release tablet Take 1 tablet by mouth daily 4/9/21   Radha Stanley MD   traZODone (DESYREL) 50 MG tablet Take 75 mg by mouth nightly    Historical Provider, MD   bumetanide (BUMEX) 2 MG tablet Take 1 tablet by mouth daily 3/30/21   Doylene Yomi, AFSANEH Cesar CNP   atorvastatin (LIPITOR) 40 MG tablet Take 1 tablet by mouth daily 3/30/21   Doylene Yomi, AFSANEH - CNP   carvedilol (COREG) 6.25 MG tablet Take 1 tablet by mouth 2 times daily 3/30/21   Doylene Yomi, AFSANEH - CNP   ranolazine (RANEXA) 500 MG extended release tablet Take 1 tablet by mouth 2 times daily  Patient taking differently: Take 1,000 mg by mouth 2 times daily  3/30/21   Doylene Yomi, AFSANEH - CNP   nitroGLYCERIN (NITROSTAT) 0.4 MG SL tablet Place 1 tablet under the tongue every 5 minutes as needed for Chest pain. 3/29/15   AFSANEH Marie CNP   Insulin Pen Needle (BD ULTRA-FINE PEN NEEDLES) 29G X 12.7MM MISC 1 each by Does not apply route 6 times daily. For use with each insulin 12/23/14   Nubia Lewis MD   docusate sodium (COLACE) 100 MG capsule Take 100 mg by mouth 2 times daily. Historical Provider, MD   Lancets 28G MISC Inject 1 each into the skin 3 times daily. 10/30/14   Nubia Lewis MD     Coumadin Use Last 7 Days:  no  Antiplatelet drug therapy use last 7 days: yes - Eliquis  Other anticoagulant use last 7 days: no  Additional Medication Information:  None      Pre-Sedation Documentation and Exam:   Vital signs have been reviewed (see flow sheet for vitals). I have reviewed the patient's history and review of systems. Lungs: clear to auscultation bilaterally, Cardiovascular: regular rate and rhythm.     Mallampati Airway Assessment:  normal, normal neck range of motion, Mallampati Class I - (soft palate, fauces, uvula & anterior/posterior tonsillar pillars are visible)    Prior History of Anesthesia Complications:   none    ASA Classification:  Class 2 - A normal healthy patient with mild systemic disease    Sedation/ Anesthesia Plan:   intravenous sedation    Medications Planned:   midazolam (Versed) intravenously and fentanyl intravenously    Patient is an appropriate candidate for plan of sedation: yes    Electronically signed by Ban Stephenson MD on 8/18/2021 at 11:30 AM

## 2021-08-18 NOTE — BRIEF OP NOTE
Brief Postoperative Note    [facility]  YOB: 1960  [id number]    Pre-operative Diagnosis: CKD    Post-operative Diagnosis: Same    Procedure:  Tunneled dialysis catheter placement    Anesthesia: Local and Moderate Sedation    Surgeons/Assistants: EYSENIA Levine      Estimated Blood Loss: less than 50     Complications: None    Specimens: Was Not Obtained    Findings: Successful TDC placement and temporary dialysis catheter removal.    Electronically signed by Aury Quezada MD on 8/18/2021 at 12:23 PM

## 2021-08-18 NOTE — PROGRESS NOTES
Speech Language Pathology  Speech Language Pathology  [facility] Unit at [facility]    Cognitive Treatment Note    Date: 8/18/2021  Patients Name: Mallory Tapia  MRN: [id number]  Diagnosis:   Patient Active Problem List   Diagnosis Code    Coronary artery disease I25.10    Chronic diastolic heart failure (HCC) I50.32    Diabetic polyneuropathy associated with type 2 diabetes mellitus (HCC) E11.42    History of coronary artery bypass graft Z95.1    Iron deficiency anemia D50.9    Spinal stenosis of lumbar region with neurogenic claudication M48.062    Mixed hyperlipidemia E78.2    Stage 4 chronic kidney disease ([facility] N18.4    Type 2 diabetes mellitus with kidney complication, with long-term current use of insulin ([address] E11.29, Z79.4    Syncope and collapse R55    Obesity, Class II, BMI 35-39.9 E66.9    Thyroid nodule greater than or equal to 1 cm in diameter incidentally noted on imaging study E04.1    Essential hypertension I10    Anemia in stage 4 chronic kidney disease (HCC) N18.4, D63.1    Chronic ischemic heart disease I25.9    Acute cerebrovascular accident (CVA) ([facility] I63.9       Pain: 3/10    Cognitive Treatment    Treatment time: 5552-0096    Subjective: [x] Alert [x] Cooperative     [] Confused     [] Agitated    [] Lethargic    Objective/Assessment:  Attention: Functional throughout    Recall: paragraph recall- 88%, 100% c cues. Problem Solving/Reasoning:  State word to description beginning c given letter for increased word finding- 75%, 100% c cues. Pt. Mo Molina. Word finding issues in conversation x3. Other:     Plan:  [x] Continue ST services    [] Discharge from ST:      Discharge recommendations: [] Inpatient Rehab   [] [place]   [] Outpatient Therapy  [] Follow up at trauma clinic   [] Other:       Treatment completed by: Carmen Luna A.CCC/SLP

## 2021-08-18 NOTE — PROGRESS NOTES
[facility]    CONSULTATION / HISTORY AND PHYSICAL EXAMINATION            Date:   8/18/2021  Patient name:  Maria Elena Basurto  Date of admission:  8/11/2021  5:47 PM  MRN:   [id number]  Account:  [de-identified]  YOB: 1960  PCP:    AFSANEH Hernandez CNP  Room:   5742/6310-00  Code Status:    Full Code    Physician Requesting Consult: Ray Armando MD    Reason for Consult:  medical management    Chief Complaint:     No chief complaint on file. Right upper and lower extremity weakness  History Obtained From:     Patient medical record nursing staff    History of Present Illness:   49-year-old  lady morbidly obese class III BMI 40.74 initially admitted to [facility] with right arm weakness and altered mental status she had an MRI done which showed acute lacunar infarct of the left frontal lobe medically managed history of chronic kidney disease had acute kidney injury required hemodialysis with right-sided Mauricio catheter in place in the jugular vein with improving renal function hemodialysis on hold    Noted to have some epistaxis this morning refusing to have anterior nasal packing done patient on anticoagulants  Multiple comorbid condition including hypertension hyperlipidemia coronary disease congestive heart failure diabetes mellitus    8/14   Patient is doing much better today  No new complaints    8/17   Plan for tunnel catheter  Blood sugars running high.   Past Medical History:     Past Medical History:   Diagnosis Date    Backache, unspecified     CHF (congestive heart failure) (HCC)     Chronic kidney disease     Coronary atherosclerosis of artery bypass graft     Cramp of limb     Gallstones     Hypertension     Insomnia     Pneumonia     Type II or unspecified type diabetes mellitus with renal manifestations, not stated as uncontrolled(250.40)     Type II or unspecified type diabetes mellitus without mention of complication, not stated as uncontrolled     Unspecified vitamin D deficiency         Past Surgical History:     Past Surgical History:   Procedure Laterality Date    ANKLE FRACTURE SURGERY      BREAST SURGERY      CARPAL TUNNEL RELEASE      CHOLECYSTECTOMY, OPEN N/A     CORONARY ARTERY BYPASS GRAFT      x3    HAND SURGERY      IR TUNNELED CATHETER PLACEMENT GREATER THAN 5 YEARS  8/18/2021    IR TUNNELED CATHETER PLACEMENT GREATER THAN 5 YEARS 8/18/2021 [facility]    KNEE ARTHROSCOPY      right    TONSILLECTOMY          Medications Prior to Admission:     Prior to Admission medications    Medication Sig Start Date End Date Taking? Authorizing Provider   busPIRone (BUSPAR) 7.5 MG tablet TAKE 1 TABLET BY MOUTH THREE TIMES DAILY 7/1/21   Historical Provider, MD   gabapentin (NEURONTIN) 300 MG capsule TAKE 1 CAPSULE BY MOUTH TWICE DAILY 6/21/21 7/23/21  AFSANEH Horn CNP   ELIQUIS 5 MG TABS tablet  6/5/21   Historical Provider, MD   blood glucose test strips (DEN CONTOUR TEST) strip 1 each by In Vitro route 3 times daily As needed. 4/30/21   AFSANEH Horn CNP   Dulaglutide (TRULICITY) 1.5 QB/4.5WJ SOPN 1.5 mg    Historical Provider, MD   insulin glargine (BASAGLAR KWIKPEN) 100 UNIT/ML injection pen Basaglar KwikPen U-100 Insulin 100 unit/mL (3 mL) subcutaneous   Inject 43 units twice a day by subcutaneous route as directed for 90 days.     Historical Provider, MD   insulin lispro, 1 Unit Dial, (HUMALOG KWIKPEN) 100 UNIT/ML SOPN Humalog KwikPen (U-100) Insulin 100 unit/mL subcutaneous   15 units with each meal plus 2-10 units as SS if BS>150    Historical Provider, MD   Biotin w/ Vitamins C & E (HAIR/SKIN/NAILS PO) Take 200 mg by mouth daily    Historical Provider, MD   allopurinol (ZYLOPRIM) 100 MG tablet Take 100 mg by mouth daily    Historical Provider, MD   sharps container 1 each by Does not apply route as needed (dirty pen needles) 4/19/21   AFSANEH Horn CNP ferrous sulfate (BRIAN-ARCHIE) 325 (65 Fe) MG tablet Take 1 tablet by mouth daily 4/19/21   AFSANEH Armas CNP   allopurinol (ZYLOPRIM) 100 MG tablet Take 1 tablet by mouth daily 4/14/21   AFSANEH Armas CNP   isosorbide mononitrate (IMDUR) 30 MG extended release tablet Take 1 tablet by mouth daily 4/9/21   Kelli Gallegos MD   traZODone (DESYREL) 50 MG tablet Take 75 mg by mouth nightly    Historical Provider, MD   bumetanide (BUMEX) 2 MG tablet Take 1 tablet by mouth daily 3/30/21   AFSANEH Armas CNP   atorvastatin (LIPITOR) 40 MG tablet Take 1 tablet by mouth daily 3/30/21   AFSANEH Armas CNP   carvedilol (COREG) 6.25 MG tablet Take 1 tablet by mouth 2 times daily 3/30/21   AFSANEH Armas CNP   ranolazine (RANEXA) 500 MG extended release tablet Take 1 tablet by mouth 2 times daily  Patient taking differently: Take 1,000 mg by mouth 2 times daily  3/30/21   AFSANEH Armas CNP   nitroGLYCERIN (NITROSTAT) 0.4 MG SL tablet Place 1 tablet under the tongue every 5 minutes as needed for Chest pain. 3/29/15   AFSANEH Nicole CNP   Insulin Pen Needle (BD ULTRA-FINE PEN NEEDLES) 29G X 12.7MM MISC 1 each by Does not apply route 6 times daily. For use with each insulin 12/23/14   Lexy San MD   docusate sodium (COLACE) 100 MG capsule Take 100 mg by mouth 2 times daily. Historical Provider, MD   Lancets 28G MISC Inject 1 each into the skin 3 times daily. 10/30/14   Lexy San MD        Allergies:     Adhesive tape, Ace inhibitors, Iv dye [iodides], and Metformin and related    Social History:     Tobacco:    reports that she has never smoked. She has never used smokeless tobacco.  Alcohol:      reports no history of alcohol use. Drug Use:  reports no history of drug use.     Family History:     Family History   Problem Relation Age of Onset    Diabetes Father     Heart Failure Father        Review of Systems:     Positive and Negative as described in HPI.    CONSTITUTIONAL:  negative for fevers, chills, sweats, fatigue, weight loss  HEENT:  negative for vision, hearing changes, runny nose, throat pain    RESPIRATORY:  negative for shortness of breath, cough, congestion, wheezing. CARDIOVASCULAR:  negative for chest pain, palpitations. GASTROINTESTINAL:  negative for nausea, vomiting, diarrhea, constipation, change in bowel habits, abdominal pain   GENITOURINARY:  negative for difficulty of urination, burning with urination, frequency   INTEGUMENT:  negative for rash, skin lesions, easy bruising   HEMATOLOGIC/LYMPHATIC:  negative for swelling/edema   ALLERGIC/IMMUNOLOGIC:  negative for urticaria , itching  ENDOCRINE:  negative increase in drinking, increase in urination, hot or cold intolerance  MUSCULOSKELETAL:  negative joint pains, muscle aches, swelling of joints  NEUROLOGICAL: Positive for right upper extremity and right lower extremity mild weakness  BEHAVIOR/PSYCH: Denies depression    Physical Exam:     /62   Pulse 68   Temp 97.4 °F (36.3 °C)   Resp 12   Ht 5' 5\" (1.651 m)   Wt 240 lb 4.8 oz (109 kg)   SpO2 100%   BMI 39.99 kg/m²   Temp (24hrs), Av.4 °F (36.3 °C), Min:97.4 °F (36.3 °C), Max:97.5 °F (36.4 °C)    Recent Labs     21  1633 21  0632 21  1012   POCGLU 192* 187* 87 83     No intake or output data in the 24 hours ending 21 1704  Dialysis catheter noted in the jugular vein right side of the neck  General Appearance:  alert, well appearing, and in no acute distress  Mental status: oriented to person, place, and time with normal affect  Head:  normocephalic, atraumatic.   Eye: no icterus, redness, pupils equal and reactive, extraocular eye movements intact, conjunctiva clear  Ear: normal external ear, no discharge, hearing intact  Nose:  no drainage noted  Small amount of epistaxis noted    Mouth: mucous membranes moist  Neck: supple, no carotid bruits, thyroid not palpable  Lungs: Bilateral equal air entry, clear to ausculation, no wheezing, rales or rhonchi, normal effort  Cardiovascular: normal rate, regular rhythm, no murmur, gallop, rub. Abdomen: Soft, nontender, nondistended, normal bowel sounds, no hepatomegaly or splenomegaly  Neurologic: There are no new focal motor or sensory deficits, normal muscle tone and bulk, no abnormal sensation, normal speech, cranial nerves II through XII grossly intact  Positive for right upper and right lower extremity 4 out of 5 strength strength  Skin: No gross lesions, rashes, bruising or bleeding on exposed skin area  Extremities:  peripheral pulses palpable, no pedal edema or calf pain with palpation  Psych:      Investigations:      Laboratory Testing:  Recent Results (from the past 24 hour(s))   POC Glucose Fingerstick    Collection Time: 08/17/21  8:14 PM   Result Value Ref Range    POC Glucose 187 (H) 65 - 105 mg/dL   POC Glucose Fingerstick    Collection Time: 08/18/21  6:32 AM   Result Value Ref Range    POC Glucose 87 65 - 105 mg/dL   Basic Metabolic Prof    Collection Time: 08/18/21  7:02 AM   Result Value Ref Range    Glucose 84 70 - 99 mg/dL    BUN 61 (H) 8 - 23 mg/dL    CREATININE 3.35 (H) 0.50 - 0.90 mg/dL    Bun/Cre Ratio NOT REPORTED 9 - 20    Calcium 9.3 8.6 - 10.4 mg/dL    Sodium 138 135 - 144 mmol/L    Potassium 4.2 3.7 - 5.3 mmol/L    Chloride 101 98 - 107 mmol/L    CO2 22 20 - 31 mmol/L    Anion Gap 15 9 - 17 mmol/L    GFR Non-African American 14 (L) >60 mL/min    GFR  17 (L) >60 mL/min    GFR Comment          GFR Staging NOT REPORTED    POC Glucose Fingerstick    Collection Time: 08/18/21 10:12 AM   Result Value Ref Range    POC Glucose 83 65 - 105 mg/dL           Consultations:   IP CONSULT TO DIETITIAN  IP CONSULT TO SOCIAL WORK  IP CONSULT TO INTERNAL MEDICINE  IP CONSULT TO NEPHROLOGY  Assessment :      Primary Problem  <principal problem not specified>    Active Hospital Problems    Diagnosis Date Noted    Acute cerebrovascular accident (CVA) ([facility] [I63.9] 08/11/2021    Chronic ischemic heart disease [I25.9] 07/23/2021    Anemia in stage 4 chronic kidney disease ([facility] [N18.4, D63.1] 05/03/2021    Essential hypertension [I10] 05/03/2021    History of coronary artery bypass graft [Z95.1] 03/31/2021    Diabetic polyneuropathy associated with type 2 diabetes mellitus ([facility] [E11.42] 02/17/2020    Chronic diastolic heart failure ([facility] [I50.32] 09/20/2018    Mixed hyperlipidemia [E78.2] 07/27/2016    Coronary artery disease [I25.10] 05/04/2015   Active Problems:    Coronary artery disease    Chronic diastolic heart failure (HCC)    Diabetic polyneuropathy associated with type 2 diabetes mellitus ([facility]    History of coronary artery bypass graft    Mixed hyperlipidemia    Essential hypertension    Anemia in stage 4 chronic kidney disease ([facility]    Chronic ischemic heart disease    Acute cerebrovascular accident (CVA) ([facility]  Resolved Problems:    * No resolved hospital problems.  *        Plan:     58-year-old lady morbidly obese class III BMI 40.74 history of chronic kidney disease hypertension coronary disease chronic diastolic congestive heart failure  New diagnosis of acute lacunar infarct with right upper and lower extremity weakness  Acute on chronic kidney disease required hemodialysis with a Mauricio catheter  Diabetes mellitus with neuropathy Lantus sliding scale, some readings of high blood sugars, increasing Lantus to 53 units twice a day, monitoring closely  Gabapentin continue for diabetic neuropathy  Anemia of chronic kidney disease received IV iron  Coronary artery disease continue carvedilol and Ranexa  Chronic diastolic congestive heart failure compensated continue Lasix  Plan for hd per nephrologist    8/18   Patient had tunnel catheter placed  Blood sugars are better controlled  Seen in dialysis unit       Colin Linares MD  8/18/2021  5:04 PM    Copy sent to Dr. Cristiano Muir, APRN - CNP    Please note that this chart was generated using voice recognition Dragon dictation software. Although every effort was made to ensure the accuracy of this automated transcription, some errors in transcription may have occurred.

## 2021-08-18 NOTE — PLAN OF CARE
Problem: Falls - Risk of:  Goal: Will remain free from falls  Description: Will remain free from falls  Outcome: Ongoing  Goal: Absence of physical injury  Description: Absence of physical injury  Outcome: Ongoing     Problem: Skin Integrity:  Goal: Will show no infection signs and symptoms  Description: Will show no infection signs and symptoms  Outcome: Ongoing  Goal: Absence of new skin breakdown  Description: Absence of new skin breakdown  Outcome: Ongoing     Problem: Pain:  Goal: Pain level will decrease  Description: Pain level will decrease  Outcome: Ongoing  Goal: Control of acute pain  Description: Control of acute pain  Outcome: Ongoing  Goal: Control of chronic pain  Description: Control of chronic pain  Outcome: Ongoing     Problem: Musculor/Skeletal Functional Status  Goal: Highest potential functional level  Outcome: Ongoing  Goal: Absence of falls  Outcome: Ongoing     Problem: Activity:  Goal: Fatigue will decrease  Description: Fatigue will decrease  Outcome: Ongoing  Goal: Risk for activity intolerance will decrease  Description: Risk for activity intolerance will decrease  Outcome: Ongoing     Problem: Fluid Volume:  Goal: Will show no signs or symptoms of fluid imbalance  Description: Will show no signs or symptoms of fluid imbalance  Outcome: Ongoing  Note: IJ catheter is clogged. Patient will have PICC line placed today 8/18.

## 2021-08-18 NOTE — PROGRESS NOTES
Physical Therapy  Facility/Department: [facility]UTE [facility]  Daily Treatment Note  NAME: Rozina Rogers  : 1960  MRN: [id number]    Date of Service: 2021    Discharge Recommendations:  Patient would benefit from continued therapy after discharge, Home with assist PRN   PT Equipment Recommendations  Equipment Needed: No    Assessment   Body structures, Functions, Activity limitations: Decreased functional mobility ; Decreased strength;Decreased endurance;Decreased balance; Increased pain  Treatment Diagnosis: CVA  Specific instructions for Next Treatment: Balance training, transfer training, gait training  REQUIRES PT FOLLOW UP: Yes  Activity Tolerance  Activity Tolerance: Patient limited by pain; Patient Tolerated treatment well     Patient Diagnosis(es): There were no encounter diagnoses. has a past medical history of Backache, unspecified, CHF (congestive heart failure) ([facility] Chronic kidney disease, Coronary atherosclerosis of artery bypass graft, Cramp of limb, Gallstones, Hypertension, Insomnia, Pneumonia, Type II or unspecified type diabetes mellitus with renal manifestations, not stated as uncontrolled(250.40), Type II or unspecified type diabetes mellitus without mention of complication, not stated as uncontrolled, and Unspecified vitamin D deficiency. has a past surgical history that includes Coronary artery bypass graft; Knee arthroscopy; Carpal tunnel release; Breast surgery; Tonsillectomy; Hand surgery; Ankle fracture surgery; Cholecystectomy, open (N/A); and IR TUNNELED CVC PLACE WO SQ PORT/PUMP > 5 YEARS (2021). Restrictions  Restrictions/Precautions  Restrictions/Precautions: Fall Risk  Required Braces or Orthoses?: Yes (B lymphedema wraps)  Required Braces or Orthoses  Right Lower Extremity Brace:  ( Lymphedema brace )  Left Lower Extremity Brace:  ( Lymphedema brace )  Subjective   General  Chart Reviewed:  Yes  Additional Pertinent Hx: Presented with acute right arm weakness and mental status change. MRI brain showed probable acute to subacute lacunar infarct of the left frontal lobe. AURELIA on CKD stage 4 likely d/t contrast induced nephropathy. Dialysis was initiated on 8/6/21, had 2 treatment session, labs improving. Pt. has history of diabetes mellitus type II, CAD s/p CABG (2005), cervical stenosis, back pain, lymphedema, diastolic CHF, DVT, urinary retention, and iron deficiency anemia. Pt. admitted to ARU from [place] [address] 8/11/21. Response To Previous Treatment: Patient with no complaints from previous session. Family / Caregiver Present: No  Referring Practitioner: Dr. Chelsea Saenz: Patient eager and agreeable for therapy  General Comment  Comments: During AM tx, transprt came and took her down to patients port placed for dialysis. For PM treatment, patient just came back from port placement and was still groggy from procedure. Patient agreeable to supine exercises in bed but politely declined getting OOB. Pain Screening  Patient Currently in Pain: Denies  Vital Signs  Patient Currently in Pain: Denies  Oxygen Therapy  O2 Device: None (Room air)       Orientation  Orientation  Overall Orientation Status: Within Normal Limits  Objective      Transfers  Sit to Stand: Stand by assistance  Stand to sit: Stand by assistance  Bed to Chair: Stand by assistance  Stand Pivot Transfers: Stand by assistance  Ambulation  Ambulation?: Yes  Ambulation 1  Surface: level tile  Device: Rollator  Assistance: Stand by assistance  Quality of Gait: no LOB noted. Decrease step length, increased LLE shakiness. Gait Deviations: Slow Annie;Decreased step height;Decreased step length; Increased ELISABET  Distance: 100ft  Comments: distnace limited by transport coming to take patient down for port placement   Stairs/Curb  Stairs?: No        Other exercises  Other exercises?: Yes  Other exercises 1: toilet transfer   Other exercises 2: supine B LE exercises x20 reps          Comment: rest breaks PRN. Goals  Short term goals  Time Frame for Short term goals: 5 days   Short term goal 1: Pt. to perform bed mobility independently. Short term goal 2: Pt. to tolerate 30 to 45 minutes of therapeutic exercise to improve strength and endurance for increased functional mobility. Short term goal 3: Pt. to improve seated static and dynamic balance to good. Short term goal 4: Pt. to improve standing static balance to fair+ and standing dynamic balance to fair. Short term goal 5: Pt. to ambulate 200ft. with supervision using a rollator. Short term goal 6: Pt. to ascend/descend 3 steps with CGA using one railing  Long term goals  Time Frame for Long term goals : By DC  Long term goal 1: Pt. to perform all transfers independently or with Mod-I. Long term goal 2: Pt. to improve standing static and dynamic balance to good. Long term goal 3: Pt. to ambulate 100ft. on an unlevel/ inclined surface with Mod-I using a rollator. Long term goal 4: Pt. to ascend/descend one flight of stairs with supervision   Long term goal 5: Pt. to ambulate 200ft. in 2 minutes with Mod-I using a rollator. Long term goal 6: Pt. to improve PASS score from 24/36 to 35/36. Patient Goals   Patient goals : Improve balance and strength to be able to walk independently with rollator. Plan    Plan  Times per week: 1.5hrs/day  Times per day: Daily  Specific instructions for Next Treatment: Balance training, transfer training, gait training  Current Treatment Recommendations: Strengthening, Balance Training, Transfer Training, Functional Mobility Training, ROM, Endurance Training, Gait Training, Stair training, Pain Management, Home Exercise Program, Safety Education & Training, Neuromuscular Re-education, Patient/Caregiver Education & Training, Equipment Evaluation, Education, & procurement  Safety Devices  Type of devices:  All fall risk precautions in place, Call light within reach, Gait belt, Patient at risk for falls, Nurse notified     Therapy Time     08/18/21 1306 08/18/21 1315   PT Individual Minutes   Time In 7418 [id number]   Time Out 1047 1302   Minutes 15 18   Minute Variance   Variance 45 12   Reason Procedure   (groggy d/t procedure )     Electronically signed by Kaity Butcher PTA on 8/18/21 at 1:17 PM EDT

## 2021-08-18 NOTE — CARE COORDINATION
Met with pt regarding potential need for dialysis upon discharge. Pt prefers to stay with her same nephrology group and some place close to home. Review of options pt agreeable to Db in [place]. Faxed request to [facility] admission. Call received from [facility]. They have 2 option; [facility] or [employer] [facility]. Met with pt; she chose [facility]. Kyle Bangura ([phone]) and notified of pt's choice. They will contact when the have a chair time.

## 2021-08-18 NOTE — POST SEDATION
Sedation Post Procedure Note    Patient Name: Ridge Mckinney   YOB: 1960  Room/Bed: 3998/3608-62  Medical Record Number: [id number]  Date: 8/18/2021   Time: 12:23 PM         Physicians/Assistants:  Korin Emery MD, MD    Procedure Performed:  [facility] placement and temp dialysis catheter removal    Post-Sedation Vital Signs:  Vitals:    08/18/21 1206   BP: 120/61   Pulse: 63   Resp: 12   Temp:    SpO2: 100%      Vital signs were reviewed and were stable after the procedure (see flow sheet for vitals)            Post-Sedation Exam: Lungs: clear and Cardiovascular: normal           Complications: none    Electronically signed by Korin Emery MD on 8/18/2021 at 12:23 PM

## 2021-08-18 NOTE — PROGRESS NOTES
Physical Medicine & Rehabilitation  Progress Note      Subjective:      59year-old female with ischemic CVA. Patient is doing well today. She notes some mild weakness while being NPO for tunnel cath procedure. She denies any issues with pain today. No new issues with sleep, appetite, bowel, or bladder. ROS:  Denies fevers, chills, sweats. No chest pain, palpitations, lightheadedness. Denies coughing, wheezing or shortness of breath. Denies abdominal pain, nausea, diarrhea or constipation. No new areas of joint pain. Denies new areas of numbness or weakness. Denies new anxiety or depression issues. No new skin problems. Rehabilitation:   Progressing in therapies. PT:  Restrictions/Precautions: Fall Risk  Required Braces or Orthoses  Right Lower Extremity Brace:  (Lymphedema brace )  Left Lower Extremity Brace:  (Lymphedema brace )   Transfers  Sit to Stand: Stand by assistance  Stand to sit: Stand by assistance  Bed to Chair: Stand by assistance  Stand Pivot Transfers: Stand by assistance  Lateral Transfers: Contact guard assistance  Comment: Transfers assessed with rolling walker. Steady, no LOB noted  Ambulation 1  Surface: level tile  Device: Rollator  Assistance: Stand by assistance  Quality of Gait: Slightly unsteady, no LOB noted. Decrease step length, increased LLE shakiness. Gait Deviations: Slow Annie, Decreased step height, Decreased step length, Increased ELISABET  Distance: 100ft x2  Comments: seated rest break in between distances on rollator. Patient demonstarted correct technique for locking and utilizing the breaks on the rollator     Transfers  Sit to Stand: Stand by assistance  Stand to sit: Stand by assistance  Bed to Chair: Stand by assistance  Stand Pivot Transfers: Stand by assistance  Lateral Transfers: Contact guard assistance  Comment: Transfers assessed with rolling walker.  Steady, no LOB noted  Ambulation  Ambulation?: Yes  Ambulation 1  Surface: level tile  Device: Rollator  Assistance: Stand by assistance  Quality of Gait: Slightly unsteady, no LOB noted. Decrease step length, increased LLE shakiness. Gait Deviations: Slow Annie, Decreased step height, Decreased step length, Increased ELISABET  Distance: 100ft x2  Comments: seated rest break in between distances on rollator. Patient demonstarted correct technique for locking and utilizing the breaks on the rollator     Surface: level tile  Ambulation 1  Surface: level tile  Device: Rollator  Assistance: Stand by assistance  Quality of Gait: Slightly unsteady, no LOB noted. Decrease step length, increased LLE shakiness. Gait Deviations: Slow Annie, Decreased step height, Decreased step length, Increased ELISABET  Distance: 100ft x2  Comments: seated rest break in between distances on rollator. Patient demonstarted correct technique for locking and utilizing the breaks on the rollator     OT:  ADL  Equipment Provided: Reacher, Sock aid  Feeding: Modified independent   Grooming: Modified independent , Increased time to complete (for blow drying hair this date due to fatigue. )  UE Bathing: Setup  LE Bathing: Minimal assistance (Assist with buttocks)  UE Dressing: Setup (SBA-CGA with set up )  LE Dressing: Minimal assistance (assist with donning lymphedema wraps, educ use of reacher. )  Toileting: Minimal assistance (Assist with hygiene throughness. )  Additional Comments: Pt declines all ADLs this morning after hving BM in bed which required a full shower. Reports she already completed oral care as well. Toileting completed in therapy gym. After toilet, pt noted bleeding to R lateral calf. Nurse Gloria Portillo notified and present to address and apply bandage. ; PM: Pt toileting completed again.     Instrumental ADL's  Instrumental ADLs: Yes     Balance  Sitting Balance: Modified independent   Standing Balance: Stand by assistance   Standing Balance  Time: AM: 1:48, 10 sec, 30 sec,; PM: 1-2 min X5  Activity: AM: tabletop task, toilet transfer; PM: tabletop tasks  Comment: no LOB, good reaching back for w/c  Functional Mobility  Functional - Mobility Device: Rolling Walker  Activity: To/from bathroom  Assist Level: Stand by assistance  Functional Mobility Comments: Verbal cues for hand placement and safety     Bed mobility  Bridging: Stand by assistance  Rolling to Left: Supervision  Rolling to Right: Supervision  Supine to Sit: Supervision  Sit to Supine: Supervision  Scooting: Modified independent  Comment: HOB slightly elevated  Transfers  Sit to stand: Stand by assistance  Stand to sit: Stand by assistance  Transfer Comments: Verbal cues for hand placement and safety   Toilet Transfers  Toilet - Technique: Ambulating  Equipment Used: Raised toilet seat with rails  Toilet Transfer: Stand by assistance  Toilet Transfers Comments: grab rail      Shower Transfers  Shower - Transfer From: Walker  Shower - Transfer Type: To and From  Shower - Transfer To: Transfer tub bench  Shower - Technique: Ambulating  Shower Transfers: Contact Guard  Shower Transfers Comments: Increased time with verbal cues for safety over threshold. SPEECH:  Subjective: [x]? Alert     [x]? Cooperative     []? Confused     []? Agitated    []? Lethargic     Objective/Assessment:  Attention: Functional throughout     Recall: paragraph recall- 88%, 100% c cues.       Problem Solving/Reasoning:  State word to description beginning c given letter for increased word finding- 75%, 100% c cues. Pt. Bee Faden. Word finding issues in conversation x3.      Other:     Objective:  /66   Pulse 66   Temp 97.5 °F (36.4 °C)   Resp 14   Ht 5' 5\" (1.651 m)   Wt 239 lb 3.2 oz (108.5 kg)   SpO2 93%   BMI 39.80 kg/m²       GEN: Well developed, well nourished, in NAD  HEENT:  NCAT. PERRL. EOMI. Mucous membranes pink and moist. R IJ triple lumen in place. PULM:  Clear to ausculation. No rales or rhonchi. Respirations WNL and unlabored. CV:  bradycardic rate regular rhythm.   No murmurs or gallops. GI:  Abdomen soft. Nontender. Non-distended. BS + and equal.    NEUROLOGICAL: A&O x3. Sensation intact to light touch. MSK:  Functional ROM all extremities. Motor testing 5/5 key muscles LUE and LLE. 4+/5 key muscles RUE and RLE. [place] SKIN: Warm dry and intact. Good turgor. Scattered ecchymosis BUEs. EXTREMITIES:  No calf tenderness to palpation. Chronic stable BLE lymphedema. PSYCH: Mood WNL. Appropriately interactive. Affect WNL. Diagnostics:     CBC:   No results for input(s): WBC, RBC, HGB, HCT, MCV, RDW, PLT in the last 72 hours. BMP:   Recent Labs     08/16/21  0637 08/17/21  0624 08/18/21  0702    134* 138   K 4.2 4.2 4.2   CL 99 98 101   CO2 23 24 22   BUN 57* 56* 61*   CREATININE 3.58* 3.49* 3.35*   GLUCOSE 172* 210* 84     BNP: No results for input(s): BNP in the last 72 hours. PT/INR:   Recent Labs     08/16/21  1103   PROTIME 17.1*   INR 1.4     APTT:   Recent Labs     08/16/21  1103   APTT 42.4*     CARDIAC ENZYMES: No results for input(s): CKMB, CKMBINDEX, TROPONINT in the last 72 hours. Invalid input(s): CKTOTAL;3  FASTING LIPID PANEL:  Lab Results   Component Value Date    CHOL 105 04/09/2015    HDL 43 04/09/2015    TRIG 168 04/09/2015     LIVER PROFILE: No results for input(s): AST, ALT, ALB, BILIDIR, BILITOT, ALKPHOS in the last 72 hours.      Current Medications:   Current Facility-Administered Medications: ceFAZolin (ANCEF) 1,000 mg in dextrose 5 % 50 mL IVPB (mini-bag), 1,000 mg, Intravenous, Once  insulin glargine (LANTUS) injection vial 53 Units, 53 Units, Subcutaneous, BID  sodium citrate 4 % injection 1.3 mL, 1.3 mL, Intracatheter, PRN  sodium citrate 4 % injection 1.3 mL, 1.3 mL, Intracatheter, PRN  traZODone (DESYREL) tablet 100 mg, 100 mg, Oral, Nightly  sodium chloride (OCEAN, BABY AYR) 0.65 % nasal spray 1 spray, 1 spray, Each Nostril, PRN  acetaminophen (TYLENOL) tablet 650 mg, 650 mg, Oral, Q4H PRN  polyethylene glycol (GLYCOLAX) packet 17 g, 17 g, Oral, Daily  senna (SENOKOT) tablet 17.2 mg, 2 tablet, Oral, Daily PRN  bisacodyl (DULCOLAX) suppository 10 mg, 10 mg, Rectal, Daily PRN  febuxostat (ULORIC) tablet 40 mg, 40 mg, Oral, Daily  tamsulosin (FLOMAX) capsule 0.4 mg, 0.4 mg, Oral, Daily  gabapentin (NEURONTIN) capsule 300 mg, 300 mg, Oral, BID  furosemide (LASIX) tablet 80 mg, 80 mg, Oral, BID  pantoprazole (PROTONIX) tablet 40 mg, 40 mg, Oral, QAM AC  ranolazine (RANEXA) extended release tablet 1,000 mg, 1,000 mg, Oral, BID  insulin lispro (HUMALOG) injection vial 0-12 Units, 0-12 Units, Subcutaneous, TID WC  insulin lispro (HUMALOG) injection vial 0-6 Units, 0-6 Units, Subcutaneous, Nightly  glucose (GLUTOSE) 40 % oral gel 15 g, 15 g, Oral, PRN  dextrose 50 % IV solution, 12.5 g, Intravenous, PRN  glucagon (rDNA) injection 1 mg, 1 mg, Intramuscular, PRN  dextrose 5 % solution, 100 mL/hr, Intravenous, PRN  apixaban (ELIQUIS) tablet 5 mg, 5 mg, Oral, BID  aspirin chewable tablet 81 mg, 81 mg, Oral, Daily  atorvastatin (LIPITOR) tablet 80 mg, 80 mg, Oral, Nightly  busPIRone (BUSPAR) tablet 7.5 mg, 7.5 mg, Oral, TID  carvedilol (COREG) tablet 6.25 mg, 6.25 mg, Oral, BID WC  ferrous sulfate (IRON 325) tablet 325 mg, 325 mg, Oral, BID WC      Impression/Plan:   Impaired ADLs, gait, and mobility due to:      1. Ischemic CVA L frontal lobe with mild R dominant hemiplegia:  PT/OT for gait, mobility, strengthening, endurance, ADLs, and self care. On ASA, atorvastatin  2. HTN/CAD/Angina/Diastolic CHF: on carvedilol, furosemide, Ranexa  3. Insomnia: on Trazodone - was taking 75 mg at home - increased to 100 mg. Sleep improved. 4. Anxiety: on buspirone  5. DM with neuropathy: on lantus, sliding scale, gabapentin  6. AURELIA on CKD IV: Initiated on HD at [facility]. Nephrology following - continuing lasix at 80 mg BID. For HD MWF. Nephrology planning tunnel cath placement by IR today. 7. Anemia of chronic disease: on ferrous sulfate.  Had IV iron at [facility]. Hb low but stable. Monitoring weekly  8. Chronic neck and back pain: pain stable. Has prn Tylenol. 9. Urine retention: on flomax  10. Bowel Management: Miralax daily, senokot prn, dulcolax prn. Noting some constipation secondary to iron. 11. DVT Prophylaxis:  SCD's while in bed, AGUSTIN's and Eliquis  12. Internal Medicine for medical management    Electronically signed by Jorge Mcguire MD on 8/18/2021 at 10:05 AM      This note is created with the assistance of a speech recognition program.  While intending to generate a document that actually reflects the content of the visit, the document can still have some errors including those of syntax and sound a like substitutions which may escape proof reading. In such instances, actual meaning can be extrapolated by contextual diversion.

## 2021-08-18 NOTE — PLAN OF CARE
Problem: Falls - Risk of:  Goal: Will remain free from falls  Description: Will remain free from falls  8/18/2021 1343 by Bautista Sandy RN  Outcome: Ongoing  8/18/2021 0357 by Letty Sands RN  Outcome: Ongoing  Goal: Absence of physical injury  Description: Absence of physical injury  8/18/2021 1343 by Bautitsa Sandy RN  Outcome: Ongoing  8/18/2021 0357 by Letty Sands RN  Outcome: Ongoing     Problem: Skin Integrity:  Goal: Will show no infection signs and symptoms  Description: Will show no infection signs and symptoms  8/18/2021 1343 by Bautista Sandy RN  Outcome: Ongoing  8/18/2021 0357 by Letty Sands RN  Outcome: Ongoing  Goal: Absence of new skin breakdown  Description: Absence of new skin breakdown  8/18/2021 1343 by Bautista Sandy RN  Outcome: Ongoing  8/18/2021 0357 by Letty Sands RN  Outcome: Ongoing     Problem: Pain:  Goal: Pain level will decrease  Description: Pain level will decrease  8/18/2021 1343 by Bautista Sandy RN  Outcome: Ongoing  8/18/2021 0357 by Letty Sands RN  Outcome: Ongoing  Goal: Control of acute pain  Description: Control of acute pain  8/18/2021 1343 by Bautista Sandy RN  Outcome: Ongoing  8/18/2021 0357 by Letty Sands RN  Outcome: Ongoing  Goal: Control of chronic pain  Description: Control of chronic pain  8/18/2021 1343 by Bautista Sandy RN  Outcome: Ongoing  8/18/2021 0357 by Letty Sands RN  Outcome: Ongoing     Problem: Musculor/Skeletal Functional Status  Goal: Highest potential functional level  8/18/2021 1343 by Bautista Sandy RN  Outcome: Ongoing  8/18/2021 0357 by Letty Sands RN  Outcome: Ongoing  Goal: Absence of falls  8/18/2021 1343 by Bautista Sandy RN  Outcome: Ongoing  8/18/2021 0357 by Letty Sands RN  Outcome: Ongoing     Problem: Nutrition  Goal: Optimal nutrition therapy  Outcome: Ongoing     Problem:  Activity:  Goal: Fatigue will decrease  Description: Fatigue will decrease  8/18/2021 1343 by Bautista Sandy RN  Outcome: Ongoing  8/18/2021 0357 by Jessica Cuba RN  Outcome: Ongoing  Goal: Risk for activity intolerance will decrease  Description: Risk for activity intolerance will decrease  8/18/2021 1343 by Shaun Rincon RN  Outcome: Ongoing  8/18/2021 0357 by Jessica Cuba RN  Outcome: Ongoing     Problem: Fluid Volume:  Goal: Will show no signs or symptoms of fluid imbalance  Description: Will show no signs or symptoms of fluid imbalance  8/18/2021 1343 by Shaun Rincon RN  Outcome: Ongoing  8/18/2021 0357 by Jessica Cuba RN  Outcome: Ongoing  Note: IJ catheter is clogged. Patient will have PICC line placed today 8/18.

## 2021-08-19 LAB
ANION GAP SERPL CALCULATED.3IONS-SCNC: 13 MMOL/L (ref 9–17)
BUN BLDV-MCNC: 32 MG/DL (ref 8–23)
BUN/CREAT BLD: ABNORMAL (ref 9–20)
CALCIUM SERPL-MCNC: 9.1 MG/DL (ref 8.6–10.4)
CHLORIDE BLD-SCNC: 97 MMOL/L (ref 98–107)
CO2: 24 MMOL/L (ref 20–31)
CREAT SERPL-MCNC: 2.65 MG/DL (ref 0.5–0.9)
GFR AFRICAN AMERICAN: 22 ML/MIN
GFR NON-AFRICAN AMERICAN: 18 ML/MIN
GFR SERPL CREATININE-BSD FRML MDRD: ABNORMAL ML/MIN/{1.73_M2}
GFR SERPL CREATININE-BSD FRML MDRD: ABNORMAL ML/MIN/{1.73_M2}
GLUCOSE BLD-MCNC: 159 MG/DL (ref 65–105)
GLUCOSE BLD-MCNC: 201 MG/DL (ref 65–105)
GLUCOSE BLD-MCNC: 245 MG/DL (ref 65–105)
GLUCOSE BLD-MCNC: 83 MG/DL (ref 70–99)
GLUCOSE BLD-MCNC: 93 MG/DL (ref 65–105)
HCT VFR BLD CALC: 29.7 % (ref 36–46)
HEMOGLOBIN: 9.8 G/DL (ref 12–16)
MCH RBC QN AUTO: 31.7 PG (ref 26–34)
MCHC RBC AUTO-ENTMCNC: 32.8 G/DL (ref 31–37)
MCV RBC AUTO: 96.4 FL (ref 80–100)
NRBC AUTOMATED: ABNORMAL PER 100 WBC
PDW BLD-RTO: 16.8 % (ref 11.5–14.9)
PLATELET # BLD: 202 K/UL (ref 150–450)
PMV BLD AUTO: 8 FL (ref 6–12)
POTASSIUM SERPL-SCNC: 3.8 MMOL/L (ref 3.7–5.3)
RBC # BLD: 3.08 M/UL (ref 4–5.2)
SODIUM BLD-SCNC: 134 MMOL/L (ref 135–144)
WBC # BLD: 7.2 K/UL (ref 3.5–11)

## 2021-08-19 PROCEDURE — 6370000000 HC RX 637 (ALT 250 FOR IP): Performed by: PHYSICAL MEDICINE & REHABILITATION

## 2021-08-19 PROCEDURE — 6370000000 HC RX 637 (ALT 250 FOR IP): Performed by: INTERNAL MEDICINE

## 2021-08-19 PROCEDURE — 97110 THERAPEUTIC EXERCISES: CPT

## 2021-08-19 PROCEDURE — 36415 COLL VENOUS BLD VENIPUNCTURE: CPT

## 2021-08-19 PROCEDURE — 85027 COMPLETE CBC AUTOMATED: CPT

## 2021-08-19 PROCEDURE — 99232 SBSQ HOSP IP/OBS MODERATE 35: CPT | Performed by: PHYSICAL MEDICINE & REHABILITATION

## 2021-08-19 PROCEDURE — 97535 SELF CARE MNGMENT TRAINING: CPT

## 2021-08-19 PROCEDURE — 97530 THERAPEUTIC ACTIVITIES: CPT

## 2021-08-19 PROCEDURE — 97129 THER IVNTJ 1ST 15 MIN: CPT

## 2021-08-19 PROCEDURE — 82947 ASSAY GLUCOSE BLOOD QUANT: CPT

## 2021-08-19 PROCEDURE — 97116 GAIT TRAINING THERAPY: CPT

## 2021-08-19 PROCEDURE — 80048 BASIC METABOLIC PNL TOTAL CA: CPT

## 2021-08-19 PROCEDURE — 99232 SBSQ HOSP IP/OBS MODERATE 35: CPT | Performed by: INTERNAL MEDICINE

## 2021-08-19 PROCEDURE — 1180000000 HC REHAB R&B

## 2021-08-19 PROCEDURE — 97130 THER IVNTJ EA ADDL 15 MIN: CPT

## 2021-08-19 RX ADMIN — INSULIN LISPRO 2 UNITS: 100 INJECTION, SOLUTION INTRAVENOUS; SUBCUTANEOUS at 11:39

## 2021-08-19 RX ADMIN — BUSPIRONE HYDROCHLORIDE 7.5 MG: 7.5 TABLET ORAL at 20:43

## 2021-08-19 RX ADMIN — TRAZODONE HYDROCHLORIDE 100 MG: 100 TABLET ORAL at 20:42

## 2021-08-19 RX ADMIN — FEBUXOSTAT 40 MG: 40 TABLET, FILM COATED ORAL at 09:46

## 2021-08-19 RX ADMIN — INSULIN LISPRO 4 UNITS: 100 INJECTION, SOLUTION INTRAVENOUS; SUBCUTANEOUS at 17:12

## 2021-08-19 RX ADMIN — CARVEDILOL 6.25 MG: 6.25 TABLET, FILM COATED ORAL at 16:55

## 2021-08-19 RX ADMIN — TAMSULOSIN HYDROCHLORIDE 0.4 MG: 0.4 CAPSULE ORAL at 09:47

## 2021-08-19 RX ADMIN — ATORVASTATIN CALCIUM 80 MG: 80 TABLET, FILM COATED ORAL at 20:42

## 2021-08-19 RX ADMIN — INSULIN GLARGINE 53 UNITS: 100 INJECTION, SOLUTION SUBCUTANEOUS at 20:44

## 2021-08-19 RX ADMIN — RANOLAZINE 1000 MG: 1000 TABLET, FILM COATED, EXTENDED RELEASE ORAL at 20:43

## 2021-08-19 RX ADMIN — APIXABAN 5 MG: 5 TABLET, FILM COATED ORAL at 20:42

## 2021-08-19 RX ADMIN — APIXABAN 5 MG: 5 TABLET, FILM COATED ORAL at 09:46

## 2021-08-19 RX ADMIN — FUROSEMIDE 80 MG: 40 TABLET ORAL at 09:47

## 2021-08-19 RX ADMIN — POLYETHYLENE GLYCOL 3350 17 G: 17 POWDER, FOR SOLUTION ORAL at 09:47

## 2021-08-19 RX ADMIN — PANTOPRAZOLE SODIUM 40 MG: 40 TABLET, DELAYED RELEASE ORAL at 06:04

## 2021-08-19 RX ADMIN — ACETAMINOPHEN 650 MG: 325 TABLET, FILM COATED ORAL at 01:42

## 2021-08-19 RX ADMIN — ACETAMINOPHEN 650 MG: 325 TABLET, FILM COATED ORAL at 09:51

## 2021-08-19 RX ADMIN — GABAPENTIN 300 MG: 300 CAPSULE ORAL at 20:42

## 2021-08-19 RX ADMIN — INSULIN LISPRO 2 UNITS: 100 INJECTION, SOLUTION INTRAVENOUS; SUBCUTANEOUS at 20:43

## 2021-08-19 RX ADMIN — BUSPIRONE HYDROCHLORIDE 7.5 MG: 7.5 TABLET ORAL at 15:01

## 2021-08-19 RX ADMIN — INSULIN GLARGINE 53 UNITS: 100 INJECTION, SOLUTION SUBCUTANEOUS at 11:38

## 2021-08-19 RX ADMIN — GABAPENTIN 300 MG: 300 CAPSULE ORAL at 09:46

## 2021-08-19 RX ADMIN — BUSPIRONE HYDROCHLORIDE 7.5 MG: 7.5 TABLET ORAL at 09:45

## 2021-08-19 RX ADMIN — CARVEDILOL 6.25 MG: 6.25 TABLET, FILM COATED ORAL at 09:47

## 2021-08-19 RX ADMIN — ASPIRIN 81 MG: 81 TABLET, CHEWABLE ORAL at 09:47

## 2021-08-19 RX ADMIN — FUROSEMIDE 80 MG: 40 TABLET ORAL at 16:56

## 2021-08-19 RX ADMIN — RANOLAZINE 1000 MG: 1000 TABLET, FILM COATED, EXTENDED RELEASE ORAL at 09:45

## 2021-08-19 ASSESSMENT — PAIN SCALES - GENERAL
PAINLEVEL_OUTOF10: 3
PAINLEVEL_OUTOF10: 3
PAINLEVEL_OUTOF10: 2
PAINLEVEL_OUTOF10: 7

## 2021-08-19 NOTE — PROGRESS NOTES
Speech Language Pathology  Speech Language Pathology  [facility] Acute Rehab Unit at [facility]    Cognitive Treatment Note    Date: 8/19/2021  Patients Name: Cesia Carrera  MRN: [id number]  Diagnosis:   Patient Active Problem List   Diagnosis Code    Coronary artery disease I25.10    Chronic diastolic heart failure (HCC) I50.32    Diabetic polyneuropathy associated with type 2 diabetes mellitus (HCC) E11.42    History of coronary artery bypass graft Z95.1    Iron deficiency anemia D50.9    Spinal stenosis of lumbar region with neurogenic claudication M48.062    Mixed hyperlipidemia E78.2    Stage 4 chronic kidney disease ([facility] [address] N18.4    Type 2 diabetes mellitus with kidney complication, with long-term current use of insulin (HCC) E11.29, Z79.4    Syncope and collapse R55    Obesity, Class II, BMI 35-39.9 E66.9    Thyroid nodule greater than or equal to 1 cm in diameter incidentally noted on imaging study E04.1    Essential hypertension I10    Anemia in stage 4 chronic kidney disease (HCC) N18.4, D63.1    Chronic ischemic heart disease I25.9    Acute cerebrovascular accident (CVA) ([facility] [address] I63.9       Pain: 8/10    Cognitive Treatment    Treatment time: 8679-0418    Subjective: [x] Alert [x] Cooperative     [] Confused     [] Agitated    [] Lethargic    Objective/Assessment:  Attention: Functional throughout    Recall: paragraph recall- 75%, 85% c cues. Problem Solving/Reasoning:  State word to description beginning c given letter for increased word finding- 56%, 89% c cues x1 and 70%, 90% c cues x1. Other:     Plan:  [x] Continue ST services    [] Discharge from :      Discharge recommendations: [] Inpatient Rehab   [] [place]   [] Outpatient Therapy  [] Follow up at trauma clinic   [] Other:       Treatment completed by: Sophia Johnson A.CCC/SLP

## 2021-08-19 NOTE — PROGRESS NOTES
Physical Therapy  [facility]  Daily Treatment Note  NAME: Ridge Mckinney  : 1960  MRN: [id number]    Date of Service: 2021    Discharge Recommendations:  Patient would benefit from continued therapy after discharge, Home with assist PRN   PT Equipment Recommendations  Equipment Needed: No    Assessment   Body structures, Functions, Activity limitations: Decreased functional mobility ; Decreased strength;Decreased endurance;Decreased balance; Increased pain  Treatment Diagnosis: CVA  Specific instructions for Next Treatment: Balance training, transfer training, gait training  REQUIRES PT FOLLOW UP: Yes  Activity Tolerance  Activity Tolerance: Patient Tolerated treatment well;Patient limited by endurance; Patient limited by fatigue     Patient Diagnosis(es): There were no encounter diagnoses. has a past medical history of Backache, unspecified, CHF (congestive heart failure) ([facility] [address] Chronic kidney disease, Coronary atherosclerosis of artery bypass graft, Cramp of limb, Gallstones, Hypertension, Insomnia, Pneumonia, Type II or unspecified type diabetes mellitus with renal manifestations, not stated as uncontrolled(250.40), Type II or unspecified type diabetes mellitus without mention of complication, not stated as uncontrolled, and Unspecified vitamin D deficiency. has a past surgical history that includes Coronary artery bypass graft; Knee arthroscopy; Carpal tunnel release; Breast surgery; Tonsillectomy; Hand surgery; Ankle fracture surgery; Cholecystectomy, open (N/A); and IR TUNNELED CVC PLACE WO SQ PORT/PUMP > 5 YEARS (2021). Restrictions  Restrictions/Precautions  Restrictions/Precautions: Fall Risk  Required Braces or Orthoses?: Yes (B lymphedema wraps. )  Required Braces or Orthoses  Right Lower Extremity Brace:  (Lymphedema brace )  Left Lower Extremity Brace:  (Lymphedema brace )  Subjective   General  Chart Reviewed:  Yes  Additional Pertinent Hx: Presented with acute right arm weakness and mental status change. MRI brain showed probable acute to subacute lacunar infarct of the left frontal lobe. AURELIA on CKD stage 4 likely d/t contrast induced nephropathy. Dialysis was initiated on 8/6/21, had 2 treatment session, labs improving. Pt. has history of diabetes mellitus type II, CAD s/p CABG (2005), cervical stenosis, back pain, lymphedema, diastolic CHF, DVT, urinary retention, and iron deficiency anemia. Pt. admitted to ARU from [place] [address] 8/11/21. Response To Previous Treatment: Patient with no complaints from previous session. Family / Caregiver Present: No  Referring Practitioner: Dr. Deepthi Cedillo: Patient more fatigued this date, however agreeable to therapy   Pain Screening  Patient Currently in Pain: Denies  Vital Signs  Patient Currently in Pain: Denies       Orientation  Orientation  Overall Orientation Status: Within Normal Limits  Objective      Transfers  Sit to Stand: Supervision  Stand to sit: Supervision  Bed to Chair: Supervision  Stand Pivot Transfers: Supervision  Comment: Transfers assessed with rolling walker. Steady, no LOB noted  Ambulation  Ambulation?: Yes  Ambulation 1  Surface: level tile  Device: Rollator  Assistance: Stand by assistance  Quality of Gait: no LOB noted. Decrease step length, increased LLE shakiness. Gait Deviations: Slow Annie;Decreased step height;Decreased step length; Increased ELISABET  Distance: 100ft x2   Comments: seated rest break on rollator in between distances   Ambulation 2  Surface - 2: level tile;ramp  Device 2: Rollator  Assistance 2: Stand by assistance  Quality of Gait 2: Slightly unsteady, no LOB noted. Decrease step length, increased LLE shakiness. Gait Deviations: Slow Annie;Decreased step height;Decreased step length; Increased ELISABET  Distance: 100ft, 75ft, 108ft   Comments: seated rest breaks on rollator between distances d/t increased SOB   Stairs/Curb  Stairs?: Yes  Stairs  # Steps : 10  Stairs Height:  (4\"/6\")  Rails: Bilateral  Device: No Device  Assistance: Supervision  Comment: patient performs step to pattern; patient becomes tearful after completing steps stating she feels SOB. SpO2 WNL        Other exercises  Other exercises?: Yes  Other exercises 1: toilet transfer   Other exercises 2: seated B LE exercises x20 reps #2 and blue tband   Other exercises 3: seated UBE 5 minutes FWD/BWD   Other exercises 4: NuStep L4 x10 minutes          Comment: rest breaks PRN. Goals  Short term goals  Time Frame for Short term goals: 5 days   Short term goal 1: Pt. to perform bed mobility independently. Short term goal 2: Pt. to tolerate 30 to 45 minutes of therapeutic exercise to improve strength and endurance for increased functional mobility. Short term goal 3: Pt. to improve seated static and dynamic balance to good. Short term goal 4: Pt. to improve standing static balance to fair+ and standing dynamic balance to fair. Short term goal 5: Pt. to ambulate 200ft. with supervision using a rollator. Short term goal 6: Pt. to ascend/descend 3 steps with CGA using one railing  Long term goals  Time Frame for Long term goals : By DC  Long term goal 1: Pt. to perform all transfers independently or with Mod-I. Long term goal 2: Pt. to improve standing static and dynamic balance to good. Long term goal 3: Pt. to ambulate 100ft. on an unlevel/ inclined surface with Mod-I using a rollator. Long term goal 4: Pt. to ascend/descend one flight of stairs with supervision   Long term goal 5: Pt. to ambulate 200ft. in 2 minutes with Mod-I using a rollator. Long term goal 6: Pt. to improve PASS score from 24/36 to 35/36. Patient Goals   Patient goals : Improve balance and strength to be able to walk independently with rollator.      Plan    Plan  Times per week: 1.5hrs/day  Times per day: Daily  Specific instructions for Next Treatment: Balance training, transfer training, gait training  Current Treatment Recommendations: Strengthening, Balance Training, Transfer Training, Functional Mobility Training, ROM, Endurance Training, Gait Training, Stair training, Pain Management, Home Exercise Program, Safety Education & Training, Neuromuscular Re-education, Patient/Caregiver Education & Training, Equipment Evaluation, Education, & procurement  Safety Devices  Type of devices:  All fall risk precautions in place, Call light within reach, Gait belt, Patient at risk for falls, Nurse notified        08/19/21 1134 08/19/21 1256   PT Individual Minutes   Time In [id number] [id number]   Time Out 9821 4228   Minutes 52 45     Electronically signed by Yolette Ingram PTA on 8/19/21 at 3:19 PM EDT

## 2021-08-19 NOTE — PROGRESS NOTES
[facility]    CONSULTATION / HISTORY AND PHYSICAL EXAMINATION            Date:   8/19/2021  Patient name:  Lenny Barrera  Date of admission:  8/11/2021  5:47 PM  MRN:   [id number]  Account:  [de-identified]  YOB: 1960  PCP:    AFSANEH Gonzalez CNP  Room:   4138/9035-43  Code Status:    Full Code    Physician Requesting Consult: Carlos Sage MD    Reason for Consult:  medical management    Chief Complaint:     No chief complaint on file. Right upper and lower extremity weakness  History Obtained From:     Patient medical record nursing staff    History of Present Illness:   78-year-old  lady morbidly obese class III BMI 40.74 initially admitted to [facility] with right arm weakness and altered mental status she had an MRI done which showed acute lacunar infarct of the left frontal lobe medically managed history of chronic kidney disease had acute kidney injury required hemodialysis with right-sided Mauricio catheter in place in the jugular vein with improving renal function hemodialysis on hold    Noted to have some epistaxis this morning refusing to have anterior nasal packing done patient on anticoagulants  Multiple comorbid condition including hypertension hyperlipidemia coronary disease congestive heart failure diabetes mellitus    8/14   Patient is doing much better today  No new complaints    8/17   Plan for tunnel catheter  Blood sugars running high.   Past Medical History:     Past Medical History:   Diagnosis Date    Backache, unspecified     CHF (congestive heart failure) (HCC)     Chronic kidney disease     Coronary atherosclerosis of artery bypass graft     Cramp of limb     Gallstones     Hypertension     Insomnia     Pneumonia     Type II or unspecified type diabetes mellitus with renal manifestations, not stated as uncontrolled(250.40)     Type II or unspecified type diabetes mellitus without mention of complication, not stated as uncontrolled     Unspecified vitamin D deficiency         Past Surgical History:     Past Surgical History:   Procedure Laterality Date    ANKLE FRACTURE SURGERY      BREAST SURGERY      CARPAL TUNNEL RELEASE      CHOLECYSTECTOMY, OPEN N/A     CORONARY ARTERY BYPASS GRAFT      x3    HAND SURGERY      IR TUNNELED CATHETER PLACEMENT GREATER THAN 5 YEARS  8/18/2021    IR TUNNELED CATHETER PLACEMENT GREATER THAN 5 YEARS 8/18/2021 [facility]    KNEE ARTHROSCOPY      right    TONSILLECTOMY          Medications Prior to Admission:     Prior to Admission medications    Medication Sig Start Date End Date Taking? Authorizing Provider   busPIRone (BUSPAR) 7.5 MG tablet TAKE 1 TABLET BY MOUTH THREE TIMES DAILY 7/1/21   Historical Provider, MD   gabapentin (NEURONTIN) 300 MG capsule TAKE 1 CAPSULE BY MOUTH TWICE DAILY 6/21/21 7/23/21  AFSANEH Ca CNP   ELIQUIS 5 MG TABS tablet  6/5/21   Historical Provider, MD   blood glucose test strips (DEN CONTOUR TEST) strip 1 each by In Vitro route 3 times daily As needed. 4/30/21   AFSANEH Ca CNP   Dulaglutide (TRULICITY) 1.5 YR/5.1QQ SOPN 1.5 mg    Historical Provider, MD   insulin glargine (BASAGLAR KWIKPEN) 100 UNIT/ML injection pen Basaglar KwikPen U-100 Insulin 100 unit/mL (3 mL) subcutaneous   Inject 43 units twice a day by subcutaneous route as directed for 90 days.     Historical Provider, MD   insulin lispro, 1 Unit Dial, (HUMALOG KWIKPEN) 100 UNIT/ML SOPN Humalog KwikPen (U-100) Insulin 100 unit/mL subcutaneous   15 units with each meal plus 2-10 units as SS if BS>150    Historical Provider, MD   Biotin w/ Vitamins C & E (HAIR/SKIN/NAILS PO) Take 200 mg by mouth daily    Historical Provider, MD   allopurinol (ZYLOPRIM) 100 MG tablet Take 100 mg by mouth daily    Historical Provider, MD   sharps container 1 each by Does not apply route as needed (dirty pen needles) 4/19/21   AFSANEH Ca CNP ferrous sulfate (BRIAN-ARCHIE) 325 (65 Fe) MG tablet Take 1 tablet by mouth daily 4/19/21   AFSANEH Jennings CNP   allopurinol (ZYLOPRIM) 100 MG tablet Take 1 tablet by mouth daily 4/14/21   AFSANEH Jennings CNP   isosorbide mononitrate (IMDUR) 30 MG extended release tablet Take 1 tablet by mouth daily 4/9/21   Robert Mas MD   traZODone (DESYREL) 50 MG tablet Take 75 mg by mouth nightly    Historical Provider, MD   bumetanide (BUMEX) 2 MG tablet Take 1 tablet by mouth daily 3/30/21   AFSANEH Jennings CNP   atorvastatin (LIPITOR) 40 MG tablet Take 1 tablet by mouth daily 3/30/21   AFSANEH Jennings CNP   carvedilol (COREG) 6.25 MG tablet Take 1 tablet by mouth 2 times daily 3/30/21   AFSANEH Jennings CNP   ranolazine (RANEXA) 500 MG extended release tablet Take 1 tablet by mouth 2 times daily  Patient taking differently: Take 1,000 mg by mouth 2 times daily  3/30/21   AFSANEH Jennings CNP   nitroGLYCERIN (NITROSTAT) 0.4 MG SL tablet Place 1 tablet under the tongue every 5 minutes as needed for Chest pain. 3/29/15   AFSANEH Olivier CNP   Insulin Pen Needle (BD ULTRA-FINE PEN NEEDLES) 29G X 12.7MM MISC 1 each by Does not apply route 6 times daily. For use with each insulin 12/23/14   Farrukh Kincaid MD   docusate sodium (COLACE) 100 MG capsule Take 100 mg by mouth 2 times daily. Historical Provider, MD   Lancets 28G MISC Inject 1 each into the skin 3 times daily. 10/30/14   Farrukh Kincaid MD        Allergies:     Adhesive tape, Ace inhibitors, Iv dye [iodides], and Metformin and related    Social History:     Tobacco:    reports that she has never smoked. She has never used smokeless tobacco.  Alcohol:      reports no history of alcohol use. Drug Use:  reports no history of drug use.     Family History:     Family History   Problem Relation Age of Onset    Diabetes Father     Heart Failure Father        Review of Systems:     Positive and Negative as described in HPI.    CONSTITUTIONAL:  negative for fevers, chills, sweats, fatigue, weight loss  HEENT:  negative for vision, hearing changes, runny nose, throat pain    RESPIRATORY:  negative for shortness of breath, cough, congestion, wheezing. CARDIOVASCULAR:  negative for chest pain, palpitations. GASTROINTESTINAL:  negative for nausea, vomiting, diarrhea, constipation, change in bowel habits, abdominal pain   GENITOURINARY:  negative for difficulty of urination, burning with urination, frequency   INTEGUMENT:  negative for rash, skin lesions, easy bruising   HEMATOLOGIC/LYMPHATIC:  negative for swelling/edema   ALLERGIC/IMMUNOLOGIC:  negative for urticaria , itching  ENDOCRINE:  negative increase in drinking, increase in urination, hot or cold intolerance  MUSCULOSKELETAL:  negative joint pains, muscle aches, swelling of joints  NEUROLOGICAL: Positive for right upper extremity and right lower extremity mild weakness  BEHAVIOR/PSYCH: Denies depression    Physical Exam:     BP (!) 111/55   Pulse 61   Temp 97.5 °F (36.4 °C) (Oral)   Resp 16   Ht 5' 5\" (1.651 m)   Wt 235 lb 14.3 oz (107 kg)   SpO2 92%   BMI 39.25 kg/m²   Temp (24hrs), Av.5 °F (36.4 °C), Min:97.3 °F (36.3 °C), Max:97.8 °F (36.6 °C)    Recent Labs     21  1811 214 21  0605 21  1101   POCGLU 97 184* 93 159*       Intake/Output Summary (Last 24 hours) at 2021 1640  Last data filed at 2021 1756  Gross per 24 hour   Intake 500 ml   Output 3500 ml   Net -3000 ml     Dialysis catheter noted in the jugular vein right side of the neck  General Appearance:  alert, well appearing, and in no acute distress  Mental status: oriented to person, place, and time with normal affect  Head:  normocephalic, atraumatic.   Eye: no icterus, redness, pupils equal and reactive, extraocular eye movements intact, conjunctiva clear  Ear: normal external ear, no discharge, hearing intact  Nose:  no drainage noted  Small amount of epistaxis - 37 g/dL    RDW 16.8 (H) 11.5 - 14.9 %    Platelets 250 197 - 858 k/uL    MPV 8.0 6.0 - 12.0 fL    NRBC Automated NOT REPORTED per 100 WBC   POC Glucose Fingerstick    Collection Time: 08/19/21  6:05 AM   Result Value Ref Range    POC Glucose 93 65 - 105 mg/dL   POC Glucose Fingerstick    Collection Time: 08/19/21 11:01 AM   Result Value Ref Range    POC Glucose 159 (H) 65 - 105 mg/dL           Consultations:   IP CONSULT TO DIETITIAN  IP CONSULT TO SOCIAL WORK  IP CONSULT TO INTERNAL MEDICINE  IP CONSULT TO NEPHROLOGY  Assessment :      Primary Problem  <principal problem not specified>    Active Hospital Problems    Diagnosis Date Noted    Acute cerebrovascular accident (CVA) ([facility] [address] [I63.9] 08/11/2021    Chronic ischemic heart disease [I25.9] 07/23/2021    Anemia in stage 4 chronic kidney disease ([facility] [address] [N18.4, D63.1] 05/03/2021    Essential hypertension [I10] 05/03/2021    History of coronary artery bypass graft [Z95.1] 03/31/2021    Diabetic polyneuropathy associated with type 2 diabetes mellitus ([facility] [address] [E11.42] 02/17/2020    Chronic diastolic heart failure ([facility] [address] [I50.32] 09/20/2018    Mixed hyperlipidemia [E78.2] 07/27/2016    Coronary artery disease [I25.10] 05/04/2015   Active Problems:    Coronary artery disease    Chronic diastolic heart failure (HCC)    Diabetic polyneuropathy associated with type 2 diabetes mellitus ([facility] [address]    History of coronary artery bypass graft    Mixed hyperlipidemia    Essential hypertension    Anemia in stage 4 chronic kidney disease ([address]    Chronic ischemic heart disease    Acute cerebrovascular accident (CVA) ([facility] [address]  Resolved Problems:    * No resolved hospital problems.  *        Plan:     66-year-old lady morbidly obese class III BMI 40.74 history of chronic kidney disease hypertension coronary disease chronic diastolic congestive heart failure  New diagnosis of acute lacunar infarct with right upper and lower extremity weakness  Acute on chronic kidney disease required hemodialysis with a Mauricio catheter  Diabetes mellitus with neuropathy Lantus sliding scale, some readings of high blood sugars, increasing Lantus to 53 units twice a day, monitoring closely  Gabapentin continue for diabetic neuropathy  Anemia of chronic kidney disease received IV iron  Coronary artery disease continue carvedilol and Ranexa  Chronic diastolic congestive heart failure compensated continue Lasix  Plan for hd per nephrologist    8/19  Patient had tunnel catheter placed  Blood sugars are better controlled  Hypertension, controlled        Safia Purdy MD  8/19/2021  4:40 PM    Copy sent to AFSANEH Yost - CNP    Please note that this chart was generated using voice recognition Dragon dictation software. Although every effort was made to ensure the accuracy of this automated transcription, some errors in transcription may have occurred.

## 2021-08-19 NOTE — PROGRESS NOTES
Comprehensive Nutrition Assessment    Type and Reason for Visit:  Reassess    Nutrition Recommendations/Plan: Continue current diet. Nutrition Assessment:  Pt states that she is eating well. Is familiar with diet guidelines previously given; RD answered questions for additional clarification. Malnutrition Assessment:  Malnutrition Status:  No malnutrition    Context:  Acute Illness     Findings of the 6 clinical characteristics of malnutrition:  Energy Intake:  No significant decrease in energy intake  Weight Loss:  No significant weight loss     Body Fat Loss:  No significant body fat loss     Muscle Mass Loss:  No significant muscle mass loss    Fluid Accumulation:  1 - Mild (May not be related to nutritional status) Extremities   Strength:  Not Performed    Estimated Daily Nutrient Needs:  Energy (kcal):  7888-9203 based on [place] with 1.1-1.2 factor; Weight Used for Energy Requirements:  Admission     Protein (g):  79-85 based on 1.4-1.5 gm per kg; Weight Used for Protein Requirements:  Ideal        Fluid (ml/day):  2000 mL or less or per nephrologist; Method Used for Fluid Requirements:  Standard Renal ( CHF)    Nutrition Related Findings:  Edema: +2 pitting RLE, LLE (8/18). K: 3.8, Na: 134, POC Glucose:  (8/19). Wounds:  Skin Tears       Current Nutrition Therapies:    ADULT DIET; Regular; 4 carb choices (60 gm/meal);  No Added Salt (3-4 gm)    Anthropometric Measures:  · Height: 5' 5\" (165.1 cm)  · Current Body Weight: 235 lb 14.3 oz (107 kg)  (fluid shifts)  · Admission Body Weight: 244 lb 11.4 oz (111 kg)    · Ideal Body Weight: 125 lbs; % Ideal Body Weight 195.8 %   · BMI: 39.3  · BMI Categories: Obese Class 2 (BMI 35.0 -39.9)       Nutrition Diagnosis:   · Altered nutrition-related lab values related to renal dysfunction, endocrine dysfuntion as evidenced by lab values    Nutrition Interventions:   Food and/or Nutrient Delivery:  Continue Current Diet  Nutrition Education/Counseling:  Education completed   Coordination of Nutrition Care:  Continue to monitor while inpatient    Goals:  Improvement in altered lab values       Nutrition Monitoring and Evaluation:   Behavioral-Environmental Outcomes:  None Identified   Food/Nutrient Intake Outcomes:  Diet Advancement/Tolerance, Food and Nutrient Intake  Physical Signs/Symptoms Outcomes:  Biochemical Data, Weight, Skin     Discharge Planning:     (Suggest carbohydrate control, 2 gm Na diet for home use with fluid limit per nephrologist. Monitor labs for additional modifications.)     Some areas of assessment may be incomplete due to standard COVID-19 Precautions. Mauricio Ferrara R.D., L.D.   Phone: [phone]

## 2021-08-19 NOTE — PLAN OF CARE
nutrition therapy  8/19/2021 1510 by Jane Chino RN  Outcome: Ongoing  8/19/2021 0512 by Wen Praisi RN  Outcome: Ongoing     Problem:  Activity:  Goal: Fatigue will decrease  Description: Fatigue will decrease  8/19/2021 1510 by Jane Chino RN  Outcome: Ongoing  8/19/2021 0512 by Wen Parisi RN  Outcome: Ongoing  Goal: Risk for activity intolerance will decrease  Description: Risk for activity intolerance will decrease  8/19/2021 1510 by Jane Chino RN  Outcome: Ongoing  8/19/2021 0512 by Wen Parisi RN  Outcome: Ongoing     Problem: Fluid Volume:  Goal: Will show no signs or symptoms of fluid imbalance  Description: Will show no signs or symptoms of fluid imbalance  8/19/2021 1510 by Jane Chino RN  Outcome: Ongoing  8/19/2021 0512 by Wen Parisi RN  Outcome: Ongoing

## 2021-08-19 NOTE — PROGRESS NOTES
Physical Medicine & Rehabilitation  Progress Note      Subjective:      59year-old female with ischemic CVA. Patient is doing well today. She had poor sleep last night after dialysis. No new issues with pain, appetite, bowel, or bladder. ROS:  Denies fevers, chills, sweats. No chest pain, palpitations, lightheadedness. Denies coughing, wheezing or shortness of breath. Denies abdominal pain, nausea, diarrhea or constipation. No new areas of joint pain. Denies new areas of numbness or weakness. Denies new anxiety or depression issues. No new skin problems. Rehabilitation:   Progressing in therapies. PT:  Restrictions/Precautions: Fall Risk  Required Braces or Orthoses  Right Lower Extremity Brace:  ( Lymphedema brace )  Left Lower Extremity Brace:  ( Lymphedema brace )   Transfers  Sit to Stand: Stand by assistance  Stand to sit: Stand by assistance  Bed to Chair: Stand by assistance  Stand Pivot Transfers: Stand by assistance  Lateral Transfers: Contact guard assistance  Comment: Transfers assessed with rolling walker. Steady, no LOB noted  Ambulation 1  Surface: level tile  Device: Rollator  Assistance: Stand by assistance  Quality of Gait: no LOB noted. Decrease step length, increased LLE shakiness. Gait Deviations: Slow Annie, Decreased step height, Decreased step length, Increased ELISABET  Distance: 100ft  Comments: distnace limited by transport coming to take patient down for port placement     Transfers  Sit to Stand: Stand by assistance  Stand to sit: Stand by assistance  Bed to Chair: Stand by assistance  Stand Pivot Transfers: Stand by assistance  Lateral Transfers: Contact guard assistance  Comment: Transfers assessed with rolling walker. Steady, no LOB noted  Ambulation  Ambulation?: Yes  Ambulation 1  Surface: level tile  Device: Rollator  Assistance: Stand by assistance  Quality of Gait: no LOB noted. Decrease step length, increased LLE shakiness.   Gait Deviations: Slow Annie, Decreased step height, Decreased step length, Increased ELISABET  Distance: 100ft  Comments: distnace limited by transport coming to take patient down for port placement     Surface: level tile  Ambulation 1  Surface: level tile  Device: Rollator  Assistance: Stand by assistance  Quality of Gait: no LOB noted. Decrease step length, increased LLE shakiness. Gait Deviations: Slow Annie, Decreased step height, Decreased step length, Increased ELISABET  Distance: 100ft  Comments: distnace limited by transport coming to take patient down for port placement     OT:  ADL  Equipment Provided: Reacher, Sock aid  Feeding: Modified independent   Grooming: Modified independent   UE Bathing: Setup  LE Bathing: Minimal assistance (Assist with buttocks. )  UE Dressing: Setup  LE Dressing: Minimal assistance (partial A with donning lymphedema wraps/socks this date)  Toileting: Stand by assistance (per pt report. )  Additional Comments: Increased time for washing hair/combing hair/blow dyring hair due to increased difficulty with overhead mvment secondary to soreness from new port placement. Instrumental ADL's  Instrumental ADLs: Yes     Balance  Sitting Balance: Supervision (on shower bench due to fatigue level this date. )  Standing Balance: Stand by assistance   Standing Balance  Time: AM: 1-2 min x3   Activity: AM: functional mobility/transfers, LBB  Comment: with RW. no LOB noted.    Functional Mobility  Functional - Mobility Device: Rolling Walker  Activity: To/from bathroom  Assist Level: Contact guard assistance (SBA-CGA )  Functional Mobility Comments: Verbal cues for hand placement and safety     Bed mobility  Bridging: Stand by assistance  Rolling to Left: Supervision  Rolling to Right: Supervision  Supine to Sit: Supervision  Sit to Supine: Supervision  Scooting: Modified independent  Comment: HOB slightly elevated  Transfers  Sit to stand: Stand by assistance, Contact guard assistance  Stand to sit: Stand by assistance, Contact guard assistance  Transfer Comments: Verbal cues for hand placement and safety   Toilet Transfers  Toilet - Technique: Ambulating  Equipment Used: Raised toilet seat with rails  Toilet Transfer: Stand by assistance  Toilet Transfers Comments: grab rail      Shower Transfers  Shower - Transfer From: Suma Romero - Transfer Type: To and From  Shower - Transfer To: Transfer tub bench  Shower - Technique: Ambulating  Shower Transfers: Contact Guard  Shower Transfers Comments: Increased time with verbal cues for safety over threshold. SPEECH:  Subjective: [x]? Alert     [x]? Cooperative     []? Confused     []? Agitated    []? Lethargic     Objective/Assessment:  Attention: Functional throughout     Recall: paragraph recall- 75%, 85% c cues.       Problem Solving/Reasoning:  State word to description beginning c given letter for increased word finding- 56%, 89% c cues x1 and 70%, 90% c cues x1.        Other:     Objective:  BP (!) 111/55   Pulse 61   Temp 97.5 °F (36.4 °C) (Oral)   Resp 16   Ht 5' 5\" (1.651 m)   Wt 235 lb 14.3 oz (107 kg)   SpO2 92%   BMI 39.25 kg/m²       GEN: Well developed, well nourished, in NAD  HEENT:  NCAT. PERRL. EOMI. Mucous membranes pink and moist. R IJ triple lumen in place. PULM:  Clear to ausculation. No rales or rhonchi. Respirations WNL and unlabored. CV:  bradycardic rate regular rhythm. No murmurs or gallops. GI:  Abdomen soft. Nontender. Non-distended. BS + and equal.    NEUROLOGICAL: A&O x3. Sensation intact to light touch. MSK:  Functional ROM all extremities. Motor testing 5/5 key muscles LUE and LLE. 4+/5 key muscles RUE and RLE. [place] SKIN: Warm dry and intact. Good turgor. Scattered ecchymosis BUEs. EXTREMITIES:  No calf tenderness to palpation. Chronic stable BLE lymphedema. PSYCH: Mood WNL. Appropriately interactive. Affect WNL.      Diagnostics:     CBC:   Recent Labs     08/19/21  0547   WBC 7.2   RBC 3.08*   HGB 9.8*   HCT 29.7*   MCV 96.4   RDW 16.8*        BMP:   Recent Labs     08/17/21  0624 08/18/21  0702 08/19/21  0547   * 138 134*   K 4.2 4.2 3.8   CL 98 101 97*   CO2 24 22 24   BUN 56* 61* 32*   CREATININE 3.49* 3.35* 2.65*   GLUCOSE 210* 84 83     BNP: No results for input(s): BNP in the last 72 hours. PT/INR:   Recent Labs     08/16/21  1103   PROTIME 17.1*   INR 1.4     APTT:   Recent Labs     08/16/21  1103   APTT 42.4*     CARDIAC ENZYMES: No results for input(s): CKMB, CKMBINDEX, TROPONINT in the last 72 hours. Invalid input(s): CKTOTAL;3  FASTING LIPID PANEL:  Lab Results   Component Value Date    CHOL 105 04/09/2015    HDL 43 04/09/2015    TRIG 168 04/09/2015     LIVER PROFILE: No results for input(s): AST, ALT, ALB, BILIDIR, BILITOT, ALKPHOS in the last 72 hours.      Current Medications:   Current Facility-Administered Medications: insulin glargine (LANTUS) injection vial 53 Units, 53 Units, Subcutaneous, BID  sodium citrate 4 % injection 1.3 mL, 1.3 mL, Intracatheter, PRN  sodium citrate 4 % injection 1.3 mL, 1.3 mL, Intracatheter, PRN  traZODone (DESYREL) tablet 100 mg, 100 mg, Oral, Nightly  sodium chloride (OCEAN, BABY AYR) 0.65 % nasal spray 1 spray, 1 spray, Each Nostril, PRN  acetaminophen (TYLENOL) tablet 650 mg, 650 mg, Oral, Q4H PRN  polyethylene glycol (GLYCOLAX) packet 17 g, 17 g, Oral, Daily  senna (SENOKOT) tablet 17.2 mg, 2 tablet, Oral, Daily PRN  bisacodyl (DULCOLAX) suppository 10 mg, 10 mg, Rectal, Daily PRN  febuxostat (ULORIC) tablet 40 mg, 40 mg, Oral, Daily  tamsulosin (FLOMAX) capsule 0.4 mg, 0.4 mg, Oral, Daily  gabapentin (NEURONTIN) capsule 300 mg, 300 mg, Oral, BID  furosemide (LASIX) tablet 80 mg, 80 mg, Oral, BID  pantoprazole (PROTONIX) tablet 40 mg, 40 mg, Oral, QAM AC  ranolazine (RANEXA) extended release tablet 1,000 mg, 1,000 mg, Oral, BID  insulin lispro (HUMALOG) injection vial 0-12 Units, 0-12 Units, Subcutaneous, TID WC  insulin lispro (HUMALOG) injection vial 0-6 Units, 0-6 Units, Subcutaneous, Nightly  glucose (GLUTOSE) 40 % oral gel 15 g, 15 g, Oral, PRN  dextrose 50 % IV solution, 12.5 g, Intravenous, PRN  glucagon (rDNA) injection 1 mg, 1 mg, Intramuscular, PRN  dextrose 5 % solution, 100 mL/hr, Intravenous, PRN  apixaban (ELIQUIS) tablet 5 mg, 5 mg, Oral, BID  aspirin chewable tablet 81 mg, 81 mg, Oral, Daily  atorvastatin (LIPITOR) tablet 80 mg, 80 mg, Oral, Nightly  busPIRone (BUSPAR) tablet 7.5 mg, 7.5 mg, Oral, TID  carvedilol (COREG) tablet 6.25 mg, 6.25 mg, Oral, BID WC  ferrous sulfate (IRON 325) tablet 325 mg, 325 mg, Oral, BID WC      Impression/Plan:   Impaired ADLs, gait, and mobility due to:      1. Ischemic CVA L frontal lobe with mild R dominant hemiplegia:  PT/OT for gait, mobility, strengthening, endurance, ADLs, and self care. On ASA, atorvastatin  2. HTN/CAD/Angina/Diastolic CHF: on carvedilol, furosemide, Ranexa  3. Insomnia: on Trazodone - was taking 75 mg at home - increased to 100 mg. Sleep improved. 4. Anxiety: on buspirone  5. DM with neuropathy: on lantus, sliding scale, gabapentin  6. AURELIA on CKD IV: Initiated on HD at [facility] [place] [facility]. Nephrology following - continuing lasix at 80 mg BID. For HD MWF. IT placed tunneled cath 8/18.  7. Anemia of chronic disease: on ferrous sulfate. Had IV iron at [facility] [place] [facility]. Hb low but stable. Monitoring routinely  8. Chronic neck and back pain: pain stable. Has prn Tylenol. 9. Urine retention: on flomax  10. Bowel Management: Miralax daily, senokot prn, dulcolax prn. Noting some constipation secondary to iron. 11. DVT Prophylaxis:  SCD's while in bed, AGUSTIN's and Eliquis  12.  Internal Medicine for medical management    Electronically signed by Hector Bullock MD on 8/19/2021 at 9:31 AM      This note is created with the assistance of a speech recognition program.  While intending to generate a document that actually reflects the content of the visit, the document can still have some errors including those of syntax and sound a like substitutions which may escape proof reading. In such instances, actual meaning can be extrapolated by contextual diversion.

## 2021-08-19 NOTE — PROGRESS NOTES
[address]   ACUTE REHABILITATION OCCUPATIONAL THERAPY  DAILY NOTE    Date: 21  Patient Name: Terry Galeas      Room: 7294/6204-07    MRN: [id number]   : 1960  (59 y.o.)  Gender: female   Referring Practitioner: Vamsi Weems MD  Diagnosis: CVA  Additional Pertinent Hx: Presented with acute right arm weakness and mental status change. MRI brain showed probable acute to subacute lacunar infarct of the left frontal lobe. AURELIA on CKD stage 4 likely d/t contrast induced nephropathy. Dialysis was initiated on 21, had 2 treatment session, labs improving. Pt. has history of diabetes mellitus type II, CAD s/p CABG (), cervical stenosis, back pain, lymphedema, diastolic CHF, DVT, urinary retention, and iron deficiency anemia. Pt. admitted to ARU from [place] [id number] 21. Restrictions  Restrictions/Precautions: Fall Risk  Required Braces or Orthoses  Right Lower Extremity Brace:  (Lymphedema brace )  Left Lower Extremity Brace:  (Lymphedema brace )  Required Braces or Orthoses?: Yes (B lymphedema wraps. )    Subjective  Subjective: \"I'm surprised I was able to get out of bed this morning\" Pt reports regarding increased fatigue and soreness from procedure and dialysis yesterday. Comments: Pt is pleasant and motivated to participate in therapy despite symptoms.    Patient Currently in Pain: Denies  Restrictions/Precautions: Fall Risk  Overall Orientation Status: Within Functional Limits     Pain Assessment  Pain Assessment: 0-10  Pain Level: 8  Pain Type: Chronic pain  Pain Location: Back, Neck    Objective     Balance  Sitting Balance: Supervision (on shower bench due to fatigue level this date. )  Standing Balance: Stand by assistance  Transfers  Sit to stand: Stand by assistance;Contact guard assistance  Stand to sit: Stand by assistance;Contact guard assistance  Standing Balance  Time: AM: 1-2 min x3   Activity: AM: functional mobility/transfers, LBB  Comment: with RW. no LOB noted. Functional Mobility  Functional - Mobility Device: Rolling Walker  Activity: To/from bathroom  Assist Level: Contact guard assistance (SBA-CGA )  Shower Transfers  Shower - Transfer From: William Salvage - Transfer Type: To and From  Shower - Transfer To: Transfer tub bench  Shower - Technique: Ambulating  Shower Transfers: Contact Guard     Type of ROM/Therapeutic Exercise  Type of ROM/Therapeutic Exercise: AROM; Free weights  Comment: LUE ex's completed with 3# weight x20 reps, RUE elbow and wrist with weight however all shoulder ex's AROM without weigth due to increased soreness in port area; x10-15 reps in RUE. Increased time and rest breaks as appropriate. Exercises  Scapular Protraction: x  Scapular Retraction: x  Shoulder Flexion:  x  Shoulder Extension: x  Horizontal ABduction: x  Horizontal ADduction: x  Chair Push-ups:    Elbow Flexion: x  Elbow Extension:  x  Supination:    Wrist Flexion: x  Wrist Extension: x  Grasp/Release: spring gripper 2nd setting x20 B hands. Other: red theragripper bar forward/upward bends, twisting x15 (increased difficulty with bending due to soreness. )     Additional Activities: Other  Additional Activities: GARCIA faciliated pt in key peg activity to address B hand coordination skills with focus on L hand, use of tweezers to remove pegs for increased just right challenge. ADL  Feeding: Modified independent   Grooming: Modified independent   UE Bathing: Setup  LE Bathing: Minimal assistance (Assist with buttocks. )  UE Dressing: Setup  LE Dressing: Minimal assistance (partial A with donning lymphedema wraps/socks this date)  Toileting: Stand by assistance (per pt report. )  Additional Comments: Increased time for washing hair/combing hair/blow dyring hair due to increased difficulty with overhead mvment secondary to soreness from new port placement. Assessment  Performance deficits / Impairments: Decreased ADL status; Decreased functional mobility ; Decreased strength;Decreased safe awareness;Decreased cognition;Decreased endurance;Decreased balance;Decreased high-level IADLs;Decreased coordination  Prognosis: Good  Discharge Recommendations: Patient would benefit from continued therapy after discharge;Home with assist PRN  Activity Tolerance: Patient Tolerated treatment well  Safety Devices in place: Yes           Plan  Plan  Times per week: 5-7  Times per day: Twice a day  Current Treatment Recommendations: Self-Care / ADL, Strengthening, ROM, Balance Training, Functional Mobility Training, Endurance Training, Neuromuscular Re-education, Cognitive Reorientation, Pain Management, Safety Education & Training, Patient/Caregiver Education & Training, Equipment Evaluation, Education, & procurement, Home Management Training, Cognitive/Perceptual Training  Patient Goals   Patient goals : \"I would like to have energy and balance and to just be able to walk around and go to the store and walk around. \"   Short term goals  Time Frame for Short term goals: By 5-6 days  Short term goal 1: Pt will complete lower body dressing with CGA and good safety  Short term goal 2: Pt will complete functional trasnfers/mobility during self care tasks with supervision and good safety with use of least restrictive device  Short term goal 3: Pt will tolerate standing 5+ minutes during functional activity of choice with good safety   Short term goal 4: Pt will verbalize/demonstrate good understanding of energy conservation techniques to increase safety and independence with self care tasks  Short term goal 5: Pt will participate in 15+ minutes of therapeutic exercises/functional activities to increase safety and independence with self care tasks.    Long term goals  Time Frame for Long term goals : By discharge  Long term goal 1: Pt will complete BADLs with modified independence and good safety  Long term goal 2: Pt will complete functional transfers/mobility during self care tasks with modified independence and good safety with use of least restrictive device  Long term goal 3: Pt will tolerate standing 8+ minutes during functional activity of choice with good safety  Long term goal 4: Pt will complete simple meal prep/light house keeping task with modified independence and good safety   Long term goal 5: Pt will verbalize/demonstrate good understanding of home safety/fall prevention to increase safety and independence with self care tasks.    Long term goals 6: Pt will demonstrated increased  strength and coordination during self care tasks as evident by and an improvement on the 9 hole peg test by 3 seconds and increased  strength by 5#        08/19/21 0858 08/19/21 1438   OT Individual Minutes   Time In 1641 [id number]   Time Out 0844 1401   Minutes 71 32     Electronically signed by DAYANA Lockett on 8/19/21 at 3:16 PM EDT

## 2021-08-19 NOTE — PLAN OF CARE
Problem: Falls - Risk of:  Goal: Will remain free from falls  Outcome: Ongoing  Note: Pt remains free of falls this shift. Approprate safety measures in place   Goal: Absence of physical injury  Outcome: Ongoing     Problem: Skin Integrity:  Goal: Will show no infection signs and symptoms  Outcome: Ongoing  Goal: Absence of new skin breakdown  Outcome: Ongoing     Problem: Pain:  Goal: Pain level will decrease  Outcome: Ongoing  Note: Pt Continued to assess pain level with appropriate pain scale. Goal: Control of acute pain  Outcome: Ongoing  Goal: Control of chronic pain  Outcome: Ongoing     Problem: Musculor/Skeletal Functional Status  Goal: Highest potential functional level  Outcome: Ongoing  Goal: Absence of falls  Outcome: Ongoing     Problem: Nutrition  Goal: Optimal nutrition therapy  Outcome: Ongoing     Problem:  Activity:  Goal: Fatigue will decrease  Outcome: Ongoing  Goal: Risk for activity intolerance will decrease  Outcome: Ongoing     Problem: Fluid Volume:  Goal: Will show no signs or symptoms of fluid imbalance  Outcome: Ongoing

## 2021-08-19 NOTE — PLAN OF CARE
Nutrition Problem #1: Altered nutrition-related lab values  Intervention: Food and/or Nutrient Delivery: Continue Current Diet  Nutritional Goals: Improvement in altered lab values

## 2021-08-20 LAB
ANION GAP SERPL CALCULATED.3IONS-SCNC: 11 MMOL/L (ref 9–17)
BUN BLDV-MCNC: 44 MG/DL (ref 8–23)
BUN/CREAT BLD: ABNORMAL (ref 9–20)
CALCIUM SERPL-MCNC: 9.2 MG/DL (ref 8.6–10.4)
CHLORIDE BLD-SCNC: 100 MMOL/L (ref 98–107)
CO2: 28 MMOL/L (ref 20–31)
CREAT SERPL-MCNC: 3.44 MG/DL (ref 0.5–0.9)
GFR AFRICAN AMERICAN: 16 ML/MIN
GFR NON-AFRICAN AMERICAN: 13 ML/MIN
GFR SERPL CREATININE-BSD FRML MDRD: ABNORMAL ML/MIN/{1.73_M2}
GFR SERPL CREATININE-BSD FRML MDRD: ABNORMAL ML/MIN/{1.73_M2}
GLUCOSE BLD-MCNC: 111 MG/DL (ref 65–105)
GLUCOSE BLD-MCNC: 195 MG/DL (ref 65–105)
GLUCOSE BLD-MCNC: 249 MG/DL (ref 65–105)
GLUCOSE BLD-MCNC: 276 MG/DL (ref 70–99)
GLUCOSE BLD-MCNC: 88 MG/DL (ref 65–105)
PHOSPHORUS: 5 MG/DL (ref 2.6–4.5)
POTASSIUM SERPL-SCNC: 4.8 MMOL/L (ref 3.7–5.3)
SODIUM BLD-SCNC: 139 MMOL/L (ref 135–144)

## 2021-08-20 PROCEDURE — 1180000000 HC REHAB R&B

## 2021-08-20 PROCEDURE — 97129 THER IVNTJ 1ST 15 MIN: CPT

## 2021-08-20 PROCEDURE — 99232 SBSQ HOSP IP/OBS MODERATE 35: CPT | Performed by: PHYSICAL MEDICINE & REHABILITATION

## 2021-08-20 PROCEDURE — 97116 GAIT TRAINING THERAPY: CPT

## 2021-08-20 PROCEDURE — 80048 BASIC METABOLIC PNL TOTAL CA: CPT

## 2021-08-20 PROCEDURE — 97110 THERAPEUTIC EXERCISES: CPT

## 2021-08-20 PROCEDURE — 6370000000 HC RX 637 (ALT 250 FOR IP): Performed by: PHYSICAL MEDICINE & REHABILITATION

## 2021-08-20 PROCEDURE — 82947 ASSAY GLUCOSE BLOOD QUANT: CPT

## 2021-08-20 PROCEDURE — 97530 THERAPEUTIC ACTIVITIES: CPT

## 2021-08-20 PROCEDURE — 97130 THER IVNTJ EA ADDL 15 MIN: CPT

## 2021-08-20 PROCEDURE — 6370000000 HC RX 637 (ALT 250 FOR IP): Performed by: INTERNAL MEDICINE

## 2021-08-20 PROCEDURE — 36415 COLL VENOUS BLD VENIPUNCTURE: CPT

## 2021-08-20 PROCEDURE — 2500000003 HC RX 250 WO HCPCS: Performed by: INTERNAL MEDICINE

## 2021-08-20 PROCEDURE — 99232 SBSQ HOSP IP/OBS MODERATE 35: CPT | Performed by: INTERNAL MEDICINE

## 2021-08-20 PROCEDURE — 84100 ASSAY OF PHOSPHORUS: CPT

## 2021-08-20 PROCEDURE — 97535 SELF CARE MNGMENT TRAINING: CPT

## 2021-08-20 PROCEDURE — 90935 HEMODIALYSIS ONE EVALUATION: CPT

## 2021-08-20 RX ADMIN — POLYETHYLENE GLYCOL 3350 17 G: 17 POWDER, FOR SOLUTION ORAL at 08:11

## 2021-08-20 RX ADMIN — PANTOPRAZOLE SODIUM 40 MG: 40 TABLET, DELAYED RELEASE ORAL at 05:25

## 2021-08-20 RX ADMIN — TAMSULOSIN HYDROCHLORIDE 0.4 MG: 0.4 CAPSULE ORAL at 08:10

## 2021-08-20 RX ADMIN — BUSPIRONE HYDROCHLORIDE 7.5 MG: 7.5 TABLET ORAL at 12:07

## 2021-08-20 RX ADMIN — ASPIRIN 81 MG: 81 TABLET, CHEWABLE ORAL at 08:10

## 2021-08-20 RX ADMIN — STANDARDIZED SENNA CONCENTRATE 17.2 MG: 8.6 TABLET ORAL at 08:18

## 2021-08-20 RX ADMIN — RANOLAZINE 1000 MG: 1000 TABLET, FILM COATED, EXTENDED RELEASE ORAL at 08:13

## 2021-08-20 RX ADMIN — TRAZODONE HYDROCHLORIDE 100 MG: 100 TABLET ORAL at 20:15

## 2021-08-20 RX ADMIN — CARVEDILOL 6.25 MG: 6.25 TABLET, FILM COATED ORAL at 20:15

## 2021-08-20 RX ADMIN — GABAPENTIN 300 MG: 300 CAPSULE ORAL at 20:15

## 2021-08-20 RX ADMIN — CARVEDILOL 6.25 MG: 6.25 TABLET, FILM COATED ORAL at 08:10

## 2021-08-20 RX ADMIN — INSULIN LISPRO 6 UNITS: 100 INJECTION, SOLUTION INTRAVENOUS; SUBCUTANEOUS at 08:20

## 2021-08-20 RX ADMIN — GABAPENTIN 300 MG: 300 CAPSULE ORAL at 08:10

## 2021-08-20 RX ADMIN — ATORVASTATIN CALCIUM 80 MG: 80 TABLET, FILM COATED ORAL at 20:15

## 2021-08-20 RX ADMIN — Medication 1.3 ML: at 19:50

## 2021-08-20 RX ADMIN — BUSPIRONE HYDROCHLORIDE 7.5 MG: 7.5 TABLET ORAL at 20:27

## 2021-08-20 RX ADMIN — INSULIN GLARGINE 53 UNITS: 100 INJECTION, SOLUTION SUBCUTANEOUS at 08:19

## 2021-08-20 RX ADMIN — FEBUXOSTAT 40 MG: 40 TABLET, FILM COATED ORAL at 08:13

## 2021-08-20 RX ADMIN — BUSPIRONE HYDROCHLORIDE 7.5 MG: 7.5 TABLET ORAL at 08:12

## 2021-08-20 RX ADMIN — APIXABAN 5 MG: 5 TABLET, FILM COATED ORAL at 20:15

## 2021-08-20 RX ADMIN — FUROSEMIDE 80 MG: 40 TABLET ORAL at 20:15

## 2021-08-20 RX ADMIN — FUROSEMIDE 80 MG: 40 TABLET ORAL at 08:11

## 2021-08-20 RX ADMIN — APIXABAN 5 MG: 5 TABLET, FILM COATED ORAL at 08:11

## 2021-08-20 RX ADMIN — INSULIN LISPRO 2 UNITS: 100 INJECTION, SOLUTION INTRAVENOUS; SUBCUTANEOUS at 12:06

## 2021-08-20 RX ADMIN — RANOLAZINE 1000 MG: 1000 TABLET, FILM COATED, EXTENDED RELEASE ORAL at 20:27

## 2021-08-20 ASSESSMENT — PAIN SCALES - GENERAL
PAINLEVEL_OUTOF10: 0
PAINLEVEL_OUTOF10: 7
PAINLEVEL_OUTOF10: 0
PAINLEVEL_OUTOF10: 4

## 2021-08-20 ASSESSMENT — PAIN DESCRIPTION - LOCATION
LOCATION: BACK
LOCATION: BACK

## 2021-08-20 ASSESSMENT — PAIN DESCRIPTION - ORIENTATION
ORIENTATION: LOWER
ORIENTATION: LOWER

## 2021-08-20 ASSESSMENT — PAIN DESCRIPTION - PAIN TYPE
TYPE: CHRONIC PAIN
TYPE: CHRONIC PAIN

## 2021-08-20 NOTE — PROGRESS NOTES
Formerly [facility] Internal Medicine    CONSULTATION / HISTORY AND PHYSICAL EXAMINATION            Date:   8/20/2021  Patient name:  Ag Maria  Date of admission:  8/11/2021  5:47 PM  MRN:   [id number]  Account:  [de-identified]  YOB: 1960  PCP:    AFSANEH Morgan CNP  Room:   8159/7124-85  Code Status:    Full Code    Physician Requesting Consult: Juliana Hampton MD    Reason for Consult:  medical management    Chief Complaint:     No chief complaint on file. Right upper and lower extremity weakness  History Obtained From:     Patient medical record nursing staff    History of Present Illness:   64-year-old  lady morbidly obese class III BMI 40.74 initially admitted to [facility] with right arm weakness and altered mental status she had an MRI done which showed acute lacunar infarct of the left frontal lobe medically managed history of chronic kidney disease had acute kidney injury required hemodialysis with right-sided Mauricio catheter in place in the jugular vein with improving renal function hemodialysis on hold    Noted to have some epistaxis this morning refusing to have anterior nasal packing done patient on anticoagulants  Multiple comorbid condition including hypertension hyperlipidemia coronary disease congestive heart failure diabetes mellitus    8/14   Patient is doing much better today  No new complaints    8/17   Plan for tunnel catheter  Blood sugars running high.   Past Medical History:     Past Medical History:   Diagnosis Date    Backache, unspecified     CHF (congestive heart failure) (HCC)     Chronic kidney disease     Coronary atherosclerosis of artery bypass graft     Cramp of limb     Gallstones     Hypertension     Insomnia     Pneumonia     Type II or unspecified type diabetes mellitus with renal manifestations, not stated as uncontrolled(250.40)     Type II or unspecified type diabetes mellitus without mention of complication, not stated as uncontrolled     Unspecified vitamin D deficiency         Past Surgical History:     Past Surgical History:   Procedure Laterality Date    ANKLE FRACTURE SURGERY      BREAST SURGERY      CARPAL TUNNEL RELEASE      CHOLECYSTECTOMY, OPEN N/A     CORONARY ARTERY BYPASS GRAFT      x3    HAND SURGERY      IR TUNNELED CATHETER PLACEMENT GREATER THAN 5 YEARS  8/18/2021    IR TUNNELED CATHETER PLACEMENT GREATER THAN 5 YEARS 8/18/2021 [facility] SPECIAL PROCEDURES    KNEE ARTHROSCOPY      right    TONSILLECTOMY          Medications Prior to Admission:     Prior to Admission medications    Medication Sig Start Date End Date Taking? Authorizing Provider   busPIRone (BUSPAR) 7.5 MG tablet TAKE 1 TABLET BY MOUTH THREE TIMES DAILY 7/1/21   Historical Provider, MD   gabapentin (NEURONTIN) 300 MG capsule TAKE 1 CAPSULE BY MOUTH TWICE DAILY 6/21/21 7/23/21  Danell Dakins, APRN - CNP   ELIQUIS 5 MG TABS tablet  6/5/21   Historical Provider, MD   blood glucose test strips (DEN CONTOUR TEST) strip 1 each by In Vitro route 3 times daily As needed. 4/30/21   Danell Dakins, APRN - CNP   Dulaglutide (TRULICITY) 1.5 EX/5.7XZ SOPN 1.5 mg    Historical Provider, MD   insulin glargine (BASAGLAR KWIKPEN) 100 UNIT/ML injection pen Basaglar KwikPen U-100 Insulin 100 unit/mL (3 mL) subcutaneous   Inject 43 units twice a day by subcutaneous route as directed for 90 days.     Historical Provider, MD   insulin lispro, 1 Unit Dial, (HUMALOG KWIKPEN) 100 UNIT/ML SOPN Humalog KwikPen (U-100) Insulin 100 unit/mL subcutaneous   15 units with each meal plus 2-10 units as SS if BS>150    Historical Provider, MD   Biotin w/ Vitamins C & E (HAIR/SKIN/NAILS PO) Take 200 mg by mouth daily    Historical Provider, MD   allopurinol (ZYLOPRIM) 100 MG tablet Take 100 mg by mouth daily    Historical Provider, MD   sharps container 1 each by Does not apply route as needed (dirty pen needles) 4/19/21   Danell Dakins, APRN - CNP ferrous sulfate (BRIAN-ARCHIE) 325 (65 Fe) MG tablet Take 1 tablet by mouth daily 4/19/21 Mabelene Primes, APRN - CNP   allopurinol (ZYLOPRIM) 100 MG tablet Take 1 tablet by mouth daily 4/14/21 Mabelene Primes, APRN - CNP   isosorbide mononitrate (IMDUR) 30 MG extended release tablet Take 1 tablet by mouth daily 4/9/21   Serina Cash MD   traZODone (DESYREL) 50 MG tablet Take 75 mg by mouth nightly    Historical Provider, MD   bumetanide (BUMEX) 2 MG tablet Take 1 tablet by mouth daily 3/30/21   Mabelene Primes, APRN - CNP   atorvastatin (LIPITOR) 40 MG tablet Take 1 tablet by mouth daily 3/30/21   Mabelene Primes, APRN - CNP   carvedilol (COREG) 6.25 MG tablet Take 1 tablet by mouth 2 times daily 3/30/21   Mabelene Primes, APRN - CNP   ranolazine (RANEXA) 500 MG extended release tablet Take 1 tablet by mouth 2 times daily  Patient taking differently: Take 1,000 mg by mouth 2 times daily  3/30/21   Mabelene Primes, APRN - CNP   nitroGLYCERIN (NITROSTAT) 0.4 MG SL tablet Place 1 tablet under the tongue every 5 minutes as needed for Chest pain. 3/29/15   AFSANEH Cohen CNP   Insulin Pen Needle (BD ULTRA-FINE PEN NEEDLES) 29G X 12.7MM MISC 1 each by Does not apply route 6 times daily. For use with each insulin 12/23/14   Leonard Ruiz MD   docusate sodium (COLACE) 100 MG capsule Take 100 mg by mouth 2 times daily. Historical Provider, MD   Lancets 28G MISC Inject 1 each into the skin 3 times daily. 10/30/14   Leonard Ruiz MD        Allergies:     Adhesive tape, Ace inhibitors, Iv dye [iodides], and Metformin and related    Social History:     Tobacco:    reports that she has never smoked. She has never used smokeless tobacco.  Alcohol:      reports no history of alcohol use. Drug Use:  reports no history of drug use.     Family History:     Family History   Problem Relation Age of Onset    Diabetes Father     Heart Failure Father        Review of Systems:     Positive and Negative as described in HPI.    CONSTITUTIONAL:  negative for fevers, chills, sweats, fatigue, weight loss  HEENT:  negative for vision, hearing changes, runny nose, throat pain    RESPIRATORY:  negative for shortness of breath, cough, congestion, wheezing. CARDIOVASCULAR:  negative for chest pain, palpitations. GASTROINTESTINAL:  negative for nausea, vomiting, diarrhea, constipation, change in bowel habits, abdominal pain   GENITOURINARY:  negative for difficulty of urination, burning with urination, frequency   INTEGUMENT:  negative for rash, skin lesions, easy bruising   HEMATOLOGIC/LYMPHATIC:  negative for swelling/edema   ALLERGIC/IMMUNOLOGIC:  negative for urticaria , itching  ENDOCRINE:  negative increase in drinking, increase in urination, hot or cold intolerance  MUSCULOSKELETAL:  negative joint pains, muscle aches, swelling of joints  NEUROLOGICAL: Positive for right upper extremity and right lower extremity mild weakness  BEHAVIOR/PSYCH: Denies depression    Physical Exam:     BP (!) 140/64   Pulse 72   Temp 97.5 °F (36.4 °C) (Oral)   Resp 18   Ht 5' 5\" (1.651 m)   Wt 235 lb 14.3 oz (107 kg)   SpO2 98%   BMI 39.25 kg/m²   Temp (24hrs), Av.5 °F (36.4 °C), Min:97.5 °F (36.4 °C), Max:97.5 °F (36.4 °C)    Recent Labs     21  1644 21  2026 21  0607 21  1134   POCGLU 201* 245* 249* 195*       Intake/Output Summary (Last 24 hours) at 2021 1522  Last data filed at 2021 1125  Gross per 24 hour   Intake 300 ml   Output --   Net 300 ml     Dialysis catheter noted in the jugular vein right side of the neck  General Appearance:  alert, well appearing, and in no acute distress  Mental status: oriented to person, place, and time with normal affect  Head:  normocephalic, atraumatic.   Eye: no icterus, redness, pupils equal and reactive, extraocular eye movements intact, conjunctiva clear  Ear: normal external ear, no discharge, hearing intact  Nose:  no drainage noted  Small amount of epistaxis noted    Mouth: mucous membranes moist  Neck: supple, no carotid bruits, thyroid not palpable  Lungs: Bilateral equal air entry, clear to ausculation, no wheezing, rales or rhonchi, normal effort  Cardiovascular: normal rate, regular rhythm, no murmur, gallop, rub. Abdomen: Soft, nontender, nondistended, normal bowel sounds, no hepatomegaly or splenomegaly  Neurologic: There are no new focal motor or sensory deficits, normal muscle tone and bulk, no abnormal sensation, normal speech, cranial nerves II through XII grossly intact  Positive for right upper and right lower extremity 4 out of 5 strength strength  Skin: No gross lesions, rashes, bruising or bleeding on exposed skin area  Extremities:  peripheral pulses palpable, no pedal edema or calf pain with palpation  Psych:      Investigations:      Laboratory Testing:  Recent Results (from the past 24 hour(s))   POC Glucose Fingerstick    Collection Time: 08/19/21  4:44 PM   Result Value Ref Range    POC Glucose 201 (H) 65 - 105 mg/dL   POC Glucose Fingerstick    Collection Time: 08/19/21  8:26 PM   Result Value Ref Range    POC Glucose 245 (H) 65 - 105 mg/dL   POC Glucose Fingerstick    Collection Time: 08/20/21  6:07 AM   Result Value Ref Range    POC Glucose 249 (H) 65 - 105 mg/dL   Basic Metabolic Prof    Collection Time: 08/20/21  7:02 AM   Result Value Ref Range    Glucose 276 (H) 70 - 99 mg/dL    BUN 44 (H) 8 - 23 mg/dL    CREATININE 3.44 (H) 0.50 - 0.90 mg/dL    Bun/Cre Ratio NOT REPORTED 9 - 20    Calcium 9.2 8.6 - 10.4 mg/dL    Sodium 139 135 - 144 mmol/L    Potassium 4.8 3.7 - 5.3 mmol/L    Chloride 100 98 - 107 mmol/L    CO2 28 20 - 31 mmol/L    Anion Gap 11 9 - 17 mmol/L    GFR Non-African American 13 (L) >60 mL/min    GFR  16 (L) >60 mL/min    GFR Comment          GFR Staging NOT REPORTED    Phosphorus    Collection Time: 08/20/21  7:02 AM   Result Value Ref Range    Phosphorus 5.0 (H) 2.6 - 4.5 mg/dL   POC Glucose Fingerstick Collection Time: 08/20/21 11:34 AM   Result Value Ref Range    POC Glucose 195 (H) 65 - 105 mg/dL           Consultations:   IP CONSULT TO DIETITIAN  IP CONSULT TO SOCIAL WORK  IP CONSULT TO INTERNAL MEDICINE  IP CONSULT TO NEPHROLOGY  Assessment :      Primary Problem  <principal problem not specified>    Active Hospital Problems    Diagnosis Date Noted    Acute cerebrovascular accident (CVA) ([facility] [I63.9] 08/11/2021    Chronic ischemic heart disease [I25.9] 07/23/2021    Anemia in stage 4 chronic kidney disease ([facility] [N18.4, D63.1] 05/03/2021    Essential hypertension [I10] 05/03/2021    History of coronary artery bypass graft [Z95.1] 03/31/2021    Diabetic polyneuropathy associated with type 2 diabetes mellitus ([facility] [E11.42] 02/17/2020    Chronic diastolic heart failure ([facility] [I50.32] 09/20/2018    Mixed hyperlipidemia [E78.2] 07/27/2016    Coronary artery disease [I25.10] 05/04/2015   Active Problems:    Coronary artery disease    Chronic diastolic heart failure (HCC)    Diabetic polyneuropathy associated with type 2 diabetes mellitus ([facility]    History of coronary artery bypass graft    Mixed hyperlipidemia    Essential hypertension    Anemia in stage 4 chronic kidney disease ([facility]    Chronic ischemic heart disease    Acute cerebrovascular accident (CVA) ([facility]  Resolved Problems:    * No resolved hospital problems.  *        Plan:     78-year-old lady morbidly obese class III BMI 40.74 history of chronic kidney disease hypertension coronary disease chronic diastolic congestive heart failure  New diagnosis of acute lacunar infarct with right upper and lower extremity weakness  Acute on chronic kidney disease required hemodialysis with a Mauricio catheter  Diabetes mellitus with neuropathy Lantus sliding scale, some readings of high blood sugars, increasing Lantus to 53 units twice a day, monitoring closely  Gabapentin continue for diabetic neuropathy  Anemia of chronic kidney disease received IV iron  Coronary artery disease continue carvedilol and Ranexa  Chronic diastolic congestive heart failure compensated continue Lasix  Plan for hd per nephrologist    8/20   Patient had tunnel catheter placed  Blood sugars, some readings are high. Lantus adjusted recently  We will monitor  Hypertension, controlled        Sharona Hirsch MD  8/20/2021  3:22 PM    Copy sent to Dr. Racheal Remy, APRN - CNP    Please note that this chart was generated using voice recognition Dragon dictation software. Although every effort was made to ensure the accuracy of this automated transcription, some errors in transcription may have occurred.

## 2021-08-20 NOTE — PROGRESS NOTES
removed 2 days ago. Denies any urinary complaints. Medication review showed use of  ARB's and diuretics.     Current Medications:    insulin glargine (LANTUS) injection vial 53 Units, BID  sodium citrate 4 % injection 1.3 mL, PRN  sodium citrate 4 % injection 1.3 mL, PRN  traZODone (DESYREL) tablet 100 mg, Nightly  sodium chloride (OCEAN, BABY AYR) 0.65 % nasal spray 1 spray, PRN  acetaminophen (TYLENOL) tablet 650 mg, Q4H PRN  polyethylene glycol (GLYCOLAX) packet 17 g, Daily  senna (SENOKOT) tablet 17.2 mg, Daily PRN  bisacodyl (DULCOLAX) suppository 10 mg, Daily PRN  febuxostat (ULORIC) tablet 40 mg, Daily  tamsulosin (FLOMAX) capsule 0.4 mg, Daily  gabapentin (NEURONTIN) capsule 300 mg, BID  furosemide (LASIX) tablet 80 mg, BID  pantoprazole (PROTONIX) tablet 40 mg, QAM AC  ranolazine (RANEXA) extended release tablet 1,000 mg, BID  insulin lispro (HUMALOG) injection vial 0-12 Units, TID WC  insulin lispro (HUMALOG) injection vial 0-6 Units, Nightly  glucose (GLUTOSE) 40 % oral gel 15 g, PRN  dextrose 50 % IV solution, PRN  glucagon (rDNA) injection 1 mg, PRN  dextrose 5 % solution, PRN  apixaban (ELIQUIS) tablet 5 mg, BID  aspirin chewable tablet 81 mg, Daily  atorvastatin (LIPITOR) tablet 80 mg, Nightly  busPIRone (BUSPAR) tablet 7.5 mg, TID  carvedilol (COREG) tablet 6.25 mg, BID WC  ferrous sulfate (IRON 325) tablet 325 mg, BID WC      Objective:  CURRENT TEMPERATURE:  Temp: 97.5 °F (36.4 °C)  MAXIMUM TEMPERATURE OVER 24HRS:  Temp (24hrs), Av.5 °F (36.4 °C), Min:97.5 °F (36.4 °C), Max:97.5 °F (36.4 °C)    CURRENT RESPIRATORY RATE:  Resp: 18  CURRENT PULSE:  Pulse: 69  CURRENT BLOOD PRESSURE:  BP: (!) 110/59  24HR BLOOD PRESSURE RANGE:  Systolic (25PGO), VWZ:882 , Min:110 , LGQ:786   ; Diastolic (37XCN), OYI:63, Min:57, Max:59    24HR INTAKE/OUTPUT:    No intake or output data in the 24 hours ending 21 0828  Patient Vitals for the past 96 hrs (Last 3 readings):   Weight   21 1756 235 lb 14.3 oz (107 kg)   08/18/21 1415 240 lb 4.8 oz (109 kg)     Physical Exam:  GENERAL APPEARANCE: Alert and cooperative, and appears to be in no acute distress. HEAD: normocephalic  CARDIAC: Normal S1 and S2. No S3, S4 or murmurs. Rhythm is regular. LUNGS: Clear to auscultation and percussion without rales, rhonchi, wheezing or diminished breath sounds. ABDOMEN: Soft with normal bowel sounds; no palpable organomegaly. MUSKULOSKELETAL: Adequately aligned spine. No joint erythema or tenderness. EXTREMITIES: 2+ bilateral lower extremity edema. Peripheral pulses intact. NEURO: Right-sided weakness    Labs:    BMP:   Recent Labs     08/18/21  0702 08/19/21  0547 08/20/21  0702    134* 139   K 4.2 3.8 4.8    97* 100   CO2 22 24 28   BUN 61* 32* 44*   CREATININE 3.35* 2.65* 3.44*   GLUCOSE 84 83 276*   CALCIUM 9.3 9.1 9.2      Assessment/plan:    1. Acute kidney injury superimposed on chronic kidney disease stage IV differentials include contrast mediated acute tubular necrosis secondary to recent CT angiogram at [facility] and progression of underlying chronic kidney disease. She started acute hemodialysis at [facility] on 8/6/2021. Patient has ongoing indications for intermittent hemodialysis . Patient will be scheduled for hemodialysis today [MWF schedule]. Basic metabolic profile daily. 2.  Systemic hypertension - blood pressure control is adequate. 3.  S/p recent left frontal ischemic stroke with right-sided weakness. Continue physical therapy exercises. 4.  Peripheral edema - continue furosemide 80 mg p.o. twice daily. 5. Anemia of chronic kidney disease - hemoglobin is 9.8 g/dL. Prognosis is guarded.     Electronically signed by Dewey Page MD on 8/20/2021 at 8:28 AM

## 2021-08-20 NOTE — PROGRESS NOTES
Physical Medicine & Rehabilitation  Progress Note      Subjective:      59year-old female with ischemic CVA. Patient is doing well today. She reports improved sleep last night. She has some mild intermittent chronic back pain which has responded to ice in the past. No new issues with appetite, bowel, or bladder. Ambulation and strength are improving. ROS:  Denies fevers, chills, sweats. No chest pain, palpitations, lightheadedness. Denies coughing, wheezing or shortness of breath. Denies abdominal pain, nausea, diarrhea or constipation. No new areas of joint pain. Denies new areas of numbness or weakness. Denies new anxiety or depression issues. No new skin problems. Rehabilitation:   Progressing in therapies. PT:  Restrictions/Precautions: Fall Risk  Required Braces or Orthoses  Right Lower Extremity Brace:  (Lymphedema brace )  Left Lower Extremity Brace:  (Lymphedema brace )   Transfers  Sit to Stand: Supervision  Stand to sit: Supervision  Bed to Chair: Supervision  Stand Pivot Transfers: Supervision  Lateral Transfers: Contact guard assistance  Comment: Transfers assessed with rolling walker. Steady, no LOB noted  Ambulation 1  Surface: level tile  Device: Rollator  Assistance: Stand by assistance  Quality of Gait: no LOB noted. Decrease step length, increased LLE shakiness. Gait Deviations: Slow Annie, Decreased step height, Decreased step length, Increased ELISABET  Distance: 110ft x2  Comments: patient ambulated to bathroom. After coming out of the bathroom, patient became very lightheaded and complained of blurry vision and a headache. Writer took /64. SpO2 98% HR 72. After seated rest break, patient started to feel better and was able to complete ambulation     Transfers  Sit to Stand: Supervision  Stand to sit: Supervision  Bed to Chair: Supervision  Stand Pivot Transfers: Supervision  Lateral Transfers: Contact guard assistance  Comment: Transfers assessed with rolling walker. Steady, no LOB noted  Ambulation  Ambulation?: Yes  Ambulation 1  Surface: level tile  Device: Rollator  Assistance: Stand by assistance  Quality of Gait: no LOB noted. Decrease step length, increased LLE shakiness. Gait Deviations: Slow Annie, Decreased step height, Decreased step length, Increased ELISABET  Distance: 110ft x2  Comments: patient ambulated to bathroom. After coming out of the bathroom, patient became very lightheaded and complained of blurry vision and a headache. Writer took /64. SpO2 98% HR 72. After seated rest break, patient started to feel better and was able to complete ambulation     Surface: level tile  Ambulation 1  Surface: level tile  Device: Rollator  Assistance: Stand by assistance  Quality of Gait: no LOB noted. Decrease step length, increased LLE shakiness. Gait Deviations: Slow Annie, Decreased step height, Decreased step length, Increased ELISABET  Distance: 110ft x2  Comments: patient ambulated to bathroom. After coming out of the bathroom, patient became very lightheaded and complained of blurry vision and a headache. Writer took /64. SpO2 98% HR 72. After seated rest break, patient started to feel better and was able to complete ambulation     OT:  ADL  Equipment Provided: Reacher, Sock aid  Feeding: Modified independent   Grooming: Modified independent  (seated w/c level )  UE Bathing: Modified independent  (pt completed set up with RW)  LE Bathing: Stand by assistance, Supervision (seated/standing sinkside. )  UE Dressing: Modified independent   LE Dressing: Minimal assistance (Assist to maria de jesus B compression stockings/sleeves. )  Toileting: Supervision  Additional Comments: Increased time for washing hair/combing hair/blow dyring hair due to increased difficulty with overhead mvment secondary to soreness from new port placement.     Instrumental ADL's  Instrumental ADLs: Yes     Balance  Sitting Balance: Modified independent   Standing Balance: Stand by assistance Standing Balance  Time: AM: ~2 min, <1 min x3  Activity: AM: ADL set up, toilet transfer, LBD  Comment: with RW. no LOB noted. Functional Mobility  Functional - Mobility Device: Rolling Walker  Activity: Retrieve items, Transport items, To/from bathroom  Assist Level: Stand by assistance  Functional Mobility Comments: Verbal cues for hand placement and safety     Bed mobility  Bridging: Stand by assistance  Rolling to Left: Supervision  Rolling to Right: Supervision  Supine to Sit: Supervision  Sit to Supine: Supervision  Scooting: Modified independent  Comment: HOB slightly elevated  Transfers  Sit to stand: Stand by assistance  Stand to sit: Stand by assistance  Transfer Comments: Verbal cues for hand placement and safety   Toilet Transfers  Toilet - Technique: Ambulating  Equipment Used: Raised toilet seat with rails  Toilet Transfer: Stand by assistance  Toilet Transfers Comments: grab rail      Shower Transfers  Shower - Transfer From: Walker  Shower - Transfer Type: To and From  Shower - Transfer To: Transfer tub bench  Shower - Technique: Ambulating  Shower Transfers: Contact Guard  Shower Transfers Comments: Increased time with verbal cues for safety over threshold. SPEECH:  Subjective: [x]? Alert     [x]? Cooperative     []? Confused     []? Agitated    []? Lethargic     Objective/Assessment:  Attention: Functional throughout     Recall: n/a      Problem Solving/Reasoning:  Pt. Needed mod A to complete 6 step ADL written sequencing. Pt. Completed written directions c 85% accuracy, 100% c cues.     Other: Pt. Minerva managing medical bills over phone as ST entered room. Pt. Had all information in well organized notebook. She states her sister assists her with keeping finances organized.      Objective:  BP (!) 140/64   Pulse 72   Temp 97.5 °F (36.4 °C) (Oral)   Resp 18   Ht 5' 5\" (1.651 m)   Wt 235 lb 14.3 oz (107 kg)   SpO2 98%   BMI 39.25 kg/m²       GEN: Well developed, well nourished, in NAD  HEENT:  NCAT. PERRL. EOMI. Mucous membranes pink and moist. R chest tunnel catheter  PULM:  Clear to ausculation. No rales or rhonchi. Respirations WNL and unlabored. CV:  bradycardic rate regular rhythm. No murmurs or gallops. GI:  Abdomen soft. Nontender. Non-distended. BS + and equal.    NEUROLOGICAL: A&O x3. Sensation intact to light touch. MSK:  Functional ROM all extremities. Motor testing 5/5 key muscles LUE and LLE. 4+/5 key muscles RUE and RLE. Louise Dye SKIN: Warm dry and intact. Good turgor. Scattered ecchymosis BUEs. EXTREMITIES:  No calf tenderness to palpation. Chronic stable BLE lymphedema. PSYCH: Mood WNL. Appropriately interactive. Affect WNL. Diagnostics:     CBC:   Recent Labs     08/19/21  0547   WBC 7.2   RBC 3.08*   HGB 9.8*   HCT 29.7*   MCV 96.4   RDW 16.8*        BMP:   Recent Labs     08/18/21  0702 08/19/21  0547 08/20/21  0702    134* 139   K 4.2 3.8 4.8    97* 100   CO2 22 24 28   PHOS  --   --  5.0*   BUN 61* 32* 44*   CREATININE 3.35* 2.65* 3.44*   GLUCOSE 84 83 276*     BNP: No results for input(s): BNP in the last 72 hours. PT/INR:   No results for input(s): PROTIME, INR in the last 72 hours. APTT:   No results for input(s): APTT in the last 72 hours. CARDIAC ENZYMES: No results for input(s): CKMB, CKMBINDEX, TROPONINT in the last 72 hours. Invalid input(s): CKTOTAL;3  FASTING LIPID PANEL:  Lab Results   Component Value Date    CHOL 105 04/09/2015    HDL 43 04/09/2015    TRIG 168 04/09/2015     LIVER PROFILE: No results for input(s): AST, ALT, ALB, BILIDIR, BILITOT, ALKPHOS in the last 72 hours.      Current Medications:   Current Facility-Administered Medications: insulin glargine (LANTUS) injection vial 53 Units, 53 Units, Subcutaneous, BID  sodium citrate 4 % injection 1.3 mL, 1.3 mL, Intracatheter, PRN  sodium citrate 4 % injection 1.3 mL, 1.3 mL, Intracatheter, PRN  traZODone (DESYREL) tablet 100 mg, 100 mg, Oral, Nightly  sodium chloride (OCEAN, BABY AYR) 0.65 % nasal spray 1 spray, 1 spray, Each Nostril, PRN  acetaminophen (TYLENOL) tablet 650 mg, 650 mg, Oral, Q4H PRN  polyethylene glycol (GLYCOLAX) packet 17 g, 17 g, Oral, Daily  senna (SENOKOT) tablet 17.2 mg, 2 tablet, Oral, Daily PRN  bisacodyl (DULCOLAX) suppository 10 mg, 10 mg, Rectal, Daily PRN  febuxostat (ULORIC) tablet 40 mg, 40 mg, Oral, Daily  tamsulosin (FLOMAX) capsule 0.4 mg, 0.4 mg, Oral, Daily  gabapentin (NEURONTIN) capsule 300 mg, 300 mg, Oral, BID  furosemide (LASIX) tablet 80 mg, 80 mg, Oral, BID  pantoprazole (PROTONIX) tablet 40 mg, 40 mg, Oral, QAM AC  ranolazine (RANEXA) extended release tablet 1,000 mg, 1,000 mg, Oral, BID  insulin lispro (HUMALOG) injection vial 0-12 Units, 0-12 Units, Subcutaneous, TID WC  insulin lispro (HUMALOG) injection vial 0-6 Units, 0-6 Units, Subcutaneous, Nightly  glucose (GLUTOSE) 40 % oral gel 15 g, 15 g, Oral, PRN  dextrose 50 % IV solution, 12.5 g, Intravenous, PRN  glucagon (rDNA) injection 1 mg, 1 mg, Intramuscular, PRN  dextrose 5 % solution, 100 mL/hr, Intravenous, PRN  apixaban (ELIQUIS) tablet 5 mg, 5 mg, Oral, BID  aspirin chewable tablet 81 mg, 81 mg, Oral, Daily  atorvastatin (LIPITOR) tablet 80 mg, 80 mg, Oral, Nightly  busPIRone (BUSPAR) tablet 7.5 mg, 7.5 mg, Oral, TID  carvedilol (COREG) tablet 6.25 mg, 6.25 mg, Oral, BID WC  ferrous sulfate (IRON 325) tablet 325 mg, 325 mg, Oral, BID WC      Impression/Plan:   Impaired ADLs, gait, and mobility due to:      1. Ischemic CVA L frontal lobe with mild R dominant hemiplegia:  PT/OT for gait, mobility, strengthening, endurance, ADLs, and self care. On ASA, atorvastatin  2. HTN/CAD/Angina/Diastolic CHF: on carvedilol, furosemide, Ranexa  3. Insomnia: on Trazodone - was taking 75 mg at home - increased to 100 mg. Sleep improved. 4. Anxiety: on buspirone  5. DM with neuropathy: on lantus, sliding scale, gabapentin  6. AURELIA on CKD IV: Initiated on HD at [facility].  Nephrology following - continuing lasix at 80 mg BID. For HD MWF. IT placed tunneled cath 8/18.  7. Anemia of chronic disease: on ferrous sulfate. Had IV iron at [address]. Hb low but stable. Monitoring routinely  8. Chronic neck and back pain: pain stable. Has prn Tylenol. Ice prn.   9. Urine retention: on flomax  10. Bowel Management: Miralax daily, senokot prn, dulcolax prn. Noting some constipation secondary to iron. 11. DVT Prophylaxis:  SCD's while in bed, AGUSTIN's and Eliquis  12. Internal Medicine for medical management    Electronically signed by Carlin Pugh MD on 8/20/2021 at 2:37 PM      This note is created with the assistance of a speech recognition program.  While intending to generate a document that actually reflects the content of the visit, the document can still have some errors including those of syntax and sound a like substitutions which may escape proof reading. In such instances, actual meaning can be extrapolated by contextual diversion.

## 2021-08-20 NOTE — PROGRESS NOTES
Speech Language Pathology  Speech Language Pathology  [facility] at [facility]    Cognitive Treatment Note    Date: 8/20/2021  Patients Name: Loly Matos  MRN: [id number]  Diagnosis:   Patient Active Problem List   Diagnosis Code    Coronary artery disease I25.10    Chronic diastolic heart failure (HCC) I50.32    Diabetic polyneuropathy associated with type 2 diabetes mellitus (HCC) E11.42    History of coronary artery bypass graft Z95.1    Iron deficiency anemia D50.9    Spinal stenosis of lumbar region with neurogenic claudication M48.062    Mixed hyperlipidemia E78.2    Stage 4 chronic kidney disease ([facility] [address] N18.4    Type 2 diabetes mellitus with kidney complication, with long-term current use of insulin ([address] E11.29, Z79.4    Syncope and collapse R55    Obesity, Class II, BMI 35-39.9 E66.9    Thyroid nodule greater than or equal to 1 cm in diameter incidentally noted on imaging study E04.1    Essential hypertension I10    Anemia in stage 4 chronic kidney disease (HCC) N18.4, D63.1    Chronic ischemic heart disease I25.9    Acute cerebrovascular accident (CVA) ([facility] [address] I63.9       Pain: 5/10    Cognitive Treatment    Treatment time: 4256-1285    Subjective: [x] Alert [x] Cooperative     [] Confused     [] Agitated    [] Lethargic    Objective/Assessment:  Attention: Functional throughout    Recall: n/a     Problem Solving/Reasoning:  Pt. Needed mod A to complete 6 step ADL written sequencing. Pt. Completed written directions c 85% accuracy, 100% c cues. Other: PtLuzma Palma managing medical bills over phone as ST entered room. Pt. Had all information in well organized notebook. She states her sister assists her with keeping finances organized.      Plan:  [x] Continue  services    [] Discharge from :      Discharge recommendations: [] Inpatient Rehab   [] [place]   [] Outpatient Therapy  [] Follow up at trauma clinic   [] Other:       Treatment completed by: Ferny SPRINGER A.CCC/SLP

## 2021-08-20 NOTE — PLAN OF CARE
Problem: Falls - Risk of:  Goal: Will remain free from falls  Description: Will remain free from falls  Outcome: Met This Shift  Note: No falls noted this shift. Patient ambulates with x1 staff assistance without difficulty. Bed kept in low position. Safe environment maintained. Bedside table & call light in reach. Uses call light appropriately when needing assistance. Problem: Pain:  Goal: Pain level will decrease  Description: Pain level will decrease  Outcome: Ongoing  Note: Pt medicated with pain medication prn. Assessed all pain characteristics including level, type, location, frequency, and onset. Non-pharmacologic interventions offered to pt as well. Pt states pain is tolerable at this time. Will continue to monitor. Problem: Musculor/Skeletal Functional Status  Goal: Highest potential functional level  Outcome: Ongoing     Problem:  Activity:  Goal: Fatigue will decrease  Description: Fatigue will decrease  Outcome: Ongoing

## 2021-08-20 NOTE — PROGRESS NOTES
HEMODIALYSIS PRE-TREATMENT NOTE    Patient Identifiers prior to treatment: band name and birthdate    Isolation Required: mrsa                   Isolation Type: contact       (please document if patient is being managed as a PUI/COVID-19 patient)        Hepatitis status:                           Date Drawn                             Result  Hepatitis B Surface Antigen 08/13/2021 neg        Hepatitis B Surface Antibody 08/13/2021 pos     48.51   Hepatitis B Core Antibody 08/13/2021 neg          How was Hepatitis Status verified: labs     Was a copy of the labs you documented provided to facility for the patient's chart: yes    Hemodialysis orders verified:yes    Access Within normal limits ( I.e. s/s of infection,...): yes    Pre-Assessment completed: yes    Pre-dialysis report received from: Ada Simmons Rn                      Time: 1409

## 2021-08-20 NOTE — PROGRESS NOTES
Physical Therapy  Facility/Department: [facility]  Daily Treatment Note  NAME: Daly Abraham  : 1960  MRN: [id number]    Date of Service: 2021    Discharge Recommendations:  Patient would benefit from continued therapy after discharge, Home with assist PRN   PT Equipment Recommendations  Equipment Needed: No    Assessment   Body structures, Functions, Activity limitations: Decreased functional mobility ; Decreased strength;Decreased endurance;Decreased balance; Increased pain  Treatment Diagnosis: CVA  Specific instructions for Next Treatment: Balance training, transfer training, gait training  REQUIRES PT FOLLOW UP: Yes  Activity Tolerance  Activity Tolerance: Patient Tolerated treatment well;Patient limited by endurance; Patient limited by fatigue     Patient Diagnosis(es): There were no encounter diagnoses. has a past medical history of Backache, unspecified, CHF (congestive heart failure) ([facility] [address] Chronic kidney disease, Coronary atherosclerosis of artery bypass graft, Cramp of limb, Gallstones, Hypertension, Insomnia, Pneumonia, Type II or unspecified type diabetes mellitus with renal manifestations, not stated as uncontrolled(250.40), Type II or unspecified type diabetes mellitus without mention of complication, not stated as uncontrolled, and Unspecified vitamin D deficiency. has a past surgical history that includes Coronary artery bypass graft; Knee arthroscopy; Carpal tunnel release; Breast surgery; Tonsillectomy; Hand surgery; Ankle fracture surgery; Cholecystectomy, open (N/A); and IR TUNNELED CVC PLACE WO SQ PORT/PUMP > 5 YEARS (2021). Restrictions  Restrictions/Precautions  Restrictions/Precautions: Fall Risk  Required Braces or Orthoses?: Yes (B lymphedema wraps)  Required Braces or Orthoses  Right Lower Extremity Brace:  (Lymphedema brace )  Left Lower Extremity Brace:  (Lymphedema brace )  Subjective   General  Chart Reviewed:  Yes  Additional Pertinent Hx: Presented with acute right arm weakness and mental status change. MRI brain showed probable acute to subacute lacunar infarct of the left frontal lobe. AURELIA on CKD stage 4 likely d/t contrast induced nephropathy. Dialysis was initiated on 8/6/21, had 2 treatment session, labs improving. Pt. has history of diabetes mellitus type II, CAD s/p CABG (2005), cervical stenosis, back pain, lymphedema, diastolic CHF, DVT, urinary retention, and iron deficiency anemia. Pt. admitted to ARU from [place] [address] 8/11/21. Response To Previous Treatment: Patient with no complaints from previous session. Family / Caregiver Present: No  Referring Practitioner: Dr. Neris Briceño: Patient eager and motivated for therapy   Pain Screening  Patient Currently in Pain: Yes  Pain Assessment  Pain Assessment: 0-10  Pain Level: 7  Pain Type: Chronic pain  Pain Location: Back  Pain Orientation: Lower  Vital Signs  Pulse: 72  Heart Rate Source: Monitor  BP: (!) 140/64  BP Location: Left upper arm  Patient Currently in Pain: Yes  Oxygen Therapy  SpO2: 98 %  Pulse Oximeter Device Mode: Intermittent  Pulse Oximeter Device Location: Finger  O2 Device: None (Room air)       Orientation  Orientation  Overall Orientation Status: Within Normal Limits  Objective      Transfers  Sit to Stand: Supervision  Stand to sit: Supervision  Bed to Chair: Supervision  Stand Pivot Transfers: Supervision  Comment: Transfers assessed with rolling walker. Steady, no LOB noted  Ambulation 1  Surface: level tile  Device: Rollator  Assistance: Stand by assistance  Quality of Gait: no LOB noted. Decrease step length, increased LLE shakiness. Gait Deviations: Slow Annie;Decreased step height;Decreased step length; Increased ELISABET  Distance: 110ft x2  Comments: patient ambulated to bathroom. After coming out of the bathroom, patient became very lightheaded and complained of blurry vision and a headache. Writer took /64. SpO2 98% HR 72.   After seated rest break, patient started to feel better and was able to complete ambulation   Stairs/Curb  Stairs?: Yes  Stairs  # Steps : 10  Stairs Height:  (4\"/6\")  Rails: Bilateral  Device: No Device  Assistance: Supervision  Comment: patient performs step to pattern        Other exercises  Other exercises?: Yes  Other exercises 1: toilet transfer   Other exercises 2: seated B LE exercises x20 reps #2 and blue tband   Other exercises 3: seated UBE 5 minutes FWD/BWD   Other exercises 4: NuStep L4 x15 minutes   Other exercises 5: stand pivot transfer from WC to recliner x2          Comment: rest breaks PRN. Goals  Short term goals  Time Frame for Short term goals: 5 days   Short term goal 1: Pt. to perform bed mobility independently. Short term goal 2: Pt. to tolerate 30 to 45 minutes of therapeutic exercise to improve strength and endurance for increased functional mobility. Short term goal 3: Pt. to improve seated static and dynamic balance to good. Short term goal 4: Pt. to improve standing static balance to fair+ and standing dynamic balance to fair. Short term goal 5: Pt. to ambulate 200ft. with supervision using a rollator. Short term goal 6: Pt. to ascend/descend 3 steps with CGA using one railing  Long term goals  Time Frame for Long term goals : By DC  Long term goal 1: Pt. to perform all transfers independently or with Mod-I. Long term goal 2: Pt. to improve standing static and dynamic balance to good. Long term goal 3: Pt. to ambulate 100ft. on an unlevel/ inclined surface with Mod-I using a rollator. Long term goal 4: Pt. to ascend/descend one flight of stairs with supervision   Long term goal 5: Pt. to ambulate 200ft. in 2 minutes with Mod-I using a rollator. Long term goal 6: Pt. to improve PASS score from 24/36 to 35/36. Patient Goals   Patient goals : Improve balance and strength to be able to walk independently with rollator.      Plan    Plan  Times per week: 1.5hrs/day  Times per day: Daily  Specific instructions for Next Treatment: Balance training, transfer training, gait training  Current Treatment Recommendations: Strengthening, Balance Training, Transfer Training, Functional Mobility Training, ROM, Endurance Training, Gait Training, Stair training, Pain Management, Home Exercise Program, Safety Education & Training, Neuromuscular Re-education, Patient/Caregiver Education & Training, Equipment Evaluation, Education, & procurement  Safety Devices  Type of devices:  All fall risk precautions in place, Call light within reach, Gait belt, Patient at risk for falls, Nurse notified        08/20/21 1218 08/20/21 1416   PT Individual Minutes   Time In 6701 4824   Time Out 0227 1244   Minutes 61 27   PT Concurrent Minutes   Time In 1020  --    Time Out 0243  --    Minutes 10  --      Electronically signed by Anastasia Flores PTA on 8/20/21 at 2:33 PM EDT

## 2021-08-20 NOTE — PROGRESS NOTES
in chair;Call light within reach; All fall risk precautions in place  Equipment Recommendations  Equipment Needed: Yes  Reacher: x  Sock Aid: x        Plan  Plan  Times per week: 5-7  Times per day: Twice a day  Current Treatment Recommendations: Self-Care / ADL, Strengthening, ROM, Balance Training, Functional Mobility Training, Endurance Training, Neuromuscular Re-education, Cognitive Reorientation, Pain Management, Safety Education & Training, Patient/Caregiver Education & Training, Equipment Evaluation, Education, & procurement, Home Management Training, Cognitive/Perceptual Training  Patient Goals   Patient goals : \"I would like to have energy and balance and to just be able to walk around and go to the store and walk around. \"   Short term goals  Time Frame for Short term goals: By 5-6 days  Short term goal 1: Pt will complete lower body dressing with CGA and good safety  Short term goal 2: Pt will complete functional trasnfers/mobility during self care tasks with supervision and good safety with use of least restrictive device  Short term goal 3: Pt will tolerate standing 5+ minutes during functional activity of choice with good safety   Short term goal 4: Pt will verbalize/demonstrate good understanding of energy conservation techniques to increase safety and independence with self care tasks  Short term goal 5: Pt will participate in 15+ minutes of therapeutic exercises/functional activities to increase safety and independence with self care tasks.    Long term goals  Time Frame for Long term goals : By discharge  Long term goal 1: Pt will complete BADLs with modified independence and good safety  Long term goal 2: Pt will complete functional transfers/mobility during self care tasks with modified independence and good safety with use of least restrictive device  Long term goal 3: Pt will tolerate standing 8+ minutes during functional activity of choice with good safety  Long term goal 4: Pt will complete simple meal prep/light house keeping task with modified independence and good safety   Long term goal 5: Pt will verbalize/demonstrate good understanding of home safety/fall prevention to increase safety and independence with self care tasks.    Long term goals 6: Pt will demonstrated increased  strength and coordination during self care tasks as evident by and an improvement on the 9 hole peg test by 3 seconds and increased  strength by 5#        08/20/21 0906 08/20/21 1511   OT Individual Minutes   Time In 6986 1301   Time Out 0829 1334   Minutes 56 33     Electronically signed by DAYANA Nielson on 8/20/21 at 3:20 PM EDT

## 2021-08-20 NOTE — PLAN OF CARE
Problem: Falls - Risk of:  Goal: Will remain free from falls  Description: Will remain free from falls  8/20/2021 0403 by Vernon hSerman RN  Outcome: Ongoing     Problem: Falls - Risk of:  Goal: Absence of physical injury  Description: Absence of physical injury  8/20/2021 0403 by Vernon Sherman RN  Outcome: Ongoing     Problem: Skin Integrity:  Goal: Will show no infection signs and symptoms  Description: Will show no infection signs and symptoms  8/20/2021 0403 by Vernon Sherman RN  Outcome: Ongoing     Problem: Skin Integrity:  Goal: Absence of new skin breakdown  Description: Absence of new skin breakdown  8/20/2021 0403 by Vernon Sherman RN  Outcome: Ongoing     Problem: Pain:  Goal: Pain level will decrease  Description: Pain level will decrease  8/20/2021 0403 by Vernon Sherman RN  Outcome: Ongoing     Problem: Pain:  Goal: Control of acute pain  Description: Control of acute pain  8/20/2021 0403 by Vernon Sherman RN  Outcome: Ongoing     Problem: Pain:  Goal: Control of chronic pain  Description: Control of chronic pain  8/20/2021 0403 by Vernon Sherman RN  Outcome: Ongoing     Problem: Musculor/Skeletal Functional Status  Goal: Highest potential functional level  8/20/2021 0403 by Vernon Sherman RN  Outcome: Ongoing     Problem: Musculor/Skeletal Functional Status  Goal: Absence of falls  8/20/2021 0403 by Vernon Sherman RN  Outcome: Ongoing       Problem: Activity:  Goal: Fatigue will decrease  Description: Fatigue will decrease  8/20/2021 0403 by Vernon Sherman RN  Outcome: Ongoing     Problem:  Activity:  Goal: Risk for activity intolerance will decrease  Description: Risk for activity intolerance will decrease  8/20/2021 0403 by Vernon Sherman RN  Outcome: Ongoing

## 2021-08-21 LAB
GLUCOSE BLD-MCNC: 194 MG/DL (ref 65–105)
GLUCOSE BLD-MCNC: 207 MG/DL (ref 65–105)
GLUCOSE BLD-MCNC: 220 MG/DL (ref 65–105)
GLUCOSE BLD-MCNC: 441 MG/DL (ref 65–105)

## 2021-08-21 PROCEDURE — 97110 THERAPEUTIC EXERCISES: CPT

## 2021-08-21 PROCEDURE — 97130 THER IVNTJ EA ADDL 15 MIN: CPT

## 2021-08-21 PROCEDURE — 97129 THER IVNTJ 1ST 15 MIN: CPT

## 2021-08-21 PROCEDURE — 97116 GAIT TRAINING THERAPY: CPT

## 2021-08-21 PROCEDURE — 6370000000 HC RX 637 (ALT 250 FOR IP): Performed by: PHYSICAL MEDICINE & REHABILITATION

## 2021-08-21 PROCEDURE — 1180000000 HC REHAB R&B

## 2021-08-21 PROCEDURE — 6370000000 HC RX 637 (ALT 250 FOR IP): Performed by: INTERNAL MEDICINE

## 2021-08-21 PROCEDURE — 97535 SELF CARE MNGMENT TRAINING: CPT

## 2021-08-21 PROCEDURE — 97530 THERAPEUTIC ACTIVITIES: CPT

## 2021-08-21 PROCEDURE — 99232 SBSQ HOSP IP/OBS MODERATE 35: CPT | Performed by: INTERNAL MEDICINE

## 2021-08-21 PROCEDURE — 99232 SBSQ HOSP IP/OBS MODERATE 35: CPT | Performed by: STUDENT IN AN ORGANIZED HEALTH CARE EDUCATION/TRAINING PROGRAM

## 2021-08-21 PROCEDURE — 82947 ASSAY GLUCOSE BLOOD QUANT: CPT

## 2021-08-21 RX ADMIN — INSULIN LISPRO 2 UNITS: 100 INJECTION, SOLUTION INTRAVENOUS; SUBCUTANEOUS at 21:58

## 2021-08-21 RX ADMIN — BUSPIRONE HYDROCHLORIDE 7.5 MG: 7.5 TABLET ORAL at 14:03

## 2021-08-21 RX ADMIN — BUSPIRONE HYDROCHLORIDE 7.5 MG: 7.5 TABLET ORAL at 21:57

## 2021-08-21 RX ADMIN — INSULIN GLARGINE 53 UNITS: 100 INJECTION, SOLUTION SUBCUTANEOUS at 21:59

## 2021-08-21 RX ADMIN — CARVEDILOL 6.25 MG: 6.25 TABLET, FILM COATED ORAL at 07:46

## 2021-08-21 RX ADMIN — FUROSEMIDE 80 MG: 40 TABLET ORAL at 16:22

## 2021-08-21 RX ADMIN — INSULIN LISPRO 4 UNITS: 100 INJECTION, SOLUTION INTRAVENOUS; SUBCUTANEOUS at 16:26

## 2021-08-21 RX ADMIN — GABAPENTIN 300 MG: 300 CAPSULE ORAL at 07:46

## 2021-08-21 RX ADMIN — RANOLAZINE 1000 MG: 1000 TABLET, FILM COATED, EXTENDED RELEASE ORAL at 21:57

## 2021-08-21 RX ADMIN — STANDARDIZED SENNA CONCENTRATE 17.2 MG: 8.6 TABLET ORAL at 08:07

## 2021-08-21 RX ADMIN — INSULIN LISPRO 2 UNITS: 100 INJECTION, SOLUTION INTRAVENOUS; SUBCUTANEOUS at 07:50

## 2021-08-21 RX ADMIN — TRAZODONE HYDROCHLORIDE 100 MG: 100 TABLET ORAL at 21:56

## 2021-08-21 RX ADMIN — ACETAMINOPHEN 650 MG: 325 TABLET, FILM COATED ORAL at 07:49

## 2021-08-21 RX ADMIN — ASPIRIN 81 MG: 81 TABLET, CHEWABLE ORAL at 07:45

## 2021-08-21 RX ADMIN — APIXABAN 5 MG: 5 TABLET, FILM COATED ORAL at 21:56

## 2021-08-21 RX ADMIN — PANTOPRAZOLE SODIUM 40 MG: 40 TABLET, DELAYED RELEASE ORAL at 05:13

## 2021-08-21 RX ADMIN — BUSPIRONE HYDROCHLORIDE 7.5 MG: 7.5 TABLET ORAL at 07:47

## 2021-08-21 RX ADMIN — TAMSULOSIN HYDROCHLORIDE 0.4 MG: 0.4 CAPSULE ORAL at 07:46

## 2021-08-21 RX ADMIN — RANOLAZINE 1000 MG: 1000 TABLET, FILM COATED, EXTENDED RELEASE ORAL at 07:48

## 2021-08-21 RX ADMIN — FUROSEMIDE 80 MG: 40 TABLET ORAL at 07:46

## 2021-08-21 RX ADMIN — FEBUXOSTAT 40 MG: 40 TABLET, FILM COATED ORAL at 07:48

## 2021-08-21 RX ADMIN — INSULIN GLARGINE 53 UNITS: 100 INJECTION, SOLUTION SUBCUTANEOUS at 08:00

## 2021-08-21 RX ADMIN — ACETAMINOPHEN 650 MG: 325 TABLET, FILM COATED ORAL at 01:15

## 2021-08-21 RX ADMIN — GABAPENTIN 300 MG: 300 CAPSULE ORAL at 21:56

## 2021-08-21 RX ADMIN — POLYETHYLENE GLYCOL 3350 17 G: 17 POWDER, FOR SOLUTION ORAL at 08:07

## 2021-08-21 RX ADMIN — CARVEDILOL 6.25 MG: 6.25 TABLET, FILM COATED ORAL at 16:22

## 2021-08-21 RX ADMIN — ATORVASTATIN CALCIUM 80 MG: 80 TABLET, FILM COATED ORAL at 21:56

## 2021-08-21 RX ADMIN — APIXABAN 5 MG: 5 TABLET, FILM COATED ORAL at 07:46

## 2021-08-21 RX ADMIN — INSULIN LISPRO 12 UNITS: 100 INJECTION, SOLUTION INTRAVENOUS; SUBCUTANEOUS at 11:26

## 2021-08-21 ASSESSMENT — PAIN DESCRIPTION - ORIENTATION
ORIENTATION: RIGHT;LEFT
ORIENTATION: RIGHT;LEFT

## 2021-08-21 ASSESSMENT — PAIN SCALES - GENERAL
PAINLEVEL_OUTOF10: 3
PAINLEVEL_OUTOF10: 0
PAINLEVEL_OUTOF10: 0
PAINLEVEL_OUTOF10: 8
PAINLEVEL_OUTOF10: 8
PAINLEVEL_OUTOF10: 4

## 2021-08-21 ASSESSMENT — PAIN DESCRIPTION - LOCATION
LOCATION: FOOT;LEG
LOCATION: LEG;FOOT;NECK

## 2021-08-21 ASSESSMENT — PAIN DESCRIPTION - PAIN TYPE
TYPE: ACUTE PAIN
TYPE: ACUTE PAIN;CHRONIC PAIN

## 2021-08-21 NOTE — PROGRESS NOTES
Physical Medicine & Rehabilitation  Progress Note      Subjective:      Everette Meckel is a 59 y.o. female with ischemic CVA. She reports doing fine today. She reports having trouble sleeping on dialysis days but states that she sleeps well on the other days. She also notes intermittent right upper limb spasms. She states that she has chronic numbness/tingling in the bilateral feet. She denies any other acute concerns. ROS:  Denies fevers, chills, sweats. No chest pain, palpitations, lightheadedness. Denies coughing, wheezing or shortness of breath. Denies abdominal pain, nausea, diarrhea or constipation. No new areas of joint pain. Denies new areas of numbness or weakness. Denies new anxiety or depression issues. No new skin problems. Rehabilitation:   Progressing in therapies. PT:  Restrictions/Precautions: Fall Risk  Required Braces or Orthoses  Right Lower Extremity Brace:  (Lymphedema brace )  Left Lower Extremity Brace:  (Lymphedema brace )   Transfers  Sit to Stand: Supervision  Stand to sit: Supervision  Bed to Chair: Supervision  Stand Pivot Transfers: Supervision  Lateral Transfers: Contact guard assistance  Comment: Transfers assessed with rolling walker. Steady, no LOB noted  Ambulation 1  Surface: level tile  Device: Rollator  Assistance: Stand by assistance  Quality of Gait: no LOB noted. Decrease step length, increased LLE shakiness. Gait Deviations: Slow Annie, Decreased step height, Decreased step length, Increased ELISABET  Distance: 110ft x2  Comments: patient ambulated to bathroom. After coming out of the bathroom, patient became very lightheaded and complained of blurry vision and a headache. Writer took /64. SpO2 98% HR 72.   After seated rest break, patient started to feel better and was able to complete ambulation     Transfers  Sit to Stand: Supervision  Stand to sit: Supervision  Bed to Chair: Supervision  Stand Pivot Transfers: Supervision  Lateral Transfers: Contact guard assistance  Comment: Transfers assessed with rolling walker. Steady, no LOB noted  Ambulation  Ambulation?: Yes  Ambulation 1  Surface: level tile  Device: Rollator  Assistance: Stand by assistance  Quality of Gait: no LOB noted. Decrease step length, increased LLE shakiness. Gait Deviations: Slow Annie, Decreased step height, Decreased step length, Increased ELISABET  Distance: 110ft x2  Comments: patient ambulated to bathroom. After coming out of the bathroom, patient became very lightheaded and complained of blurry vision and a headache. Writer took /64. SpO2 98% HR 72. After seated rest break, patient started to feel better and was able to complete ambulation     Surface: level tile  Ambulation 1  Surface: level tile  Device: Rollator  Assistance: Stand by assistance  Quality of Gait: no LOB noted. Decrease step length, increased LLE shakiness. Gait Deviations: Slow Annie, Decreased step height, Decreased step length, Increased ELISABET  Distance: 110ft x2  Comments: patient ambulated to bathroom. After coming out of the bathroom, patient became very lightheaded and complained of blurry vision and a headache. Writer took /64. SpO2 98% HR 72. After seated rest break, patient started to feel better and was able to complete ambulation     OT:  ADL  Equipment Provided: Reacher, Sock aid  Feeding: Modified independent   Grooming: Modified independent  (seated at w/c)  UE Bathing: Setup, Increased time to complete (seated on tub bench)  LE Bathing: Minimal assistance (A washing buttocks standing at grab bar. )  UE Dressing: Modified independent   LE Dressing: Minimal assistance (A with lymphedema coks/braces only. )  Toileting: Supervision  Additional Comments: GARCIA facilitates pt in full shower this AM seated on tub bench. Pt requries increased assist with donning lymphedema socks and braces due to increased fatigue and pain in B feet. Is able to thread but requries assist with tighttening. Able to maria de jesus non slip socks with sock aide. Reprots she has a scks aide at [facility] to assist with lymphendema socks. Instrumental ADL's  Instrumental ADLs: Yes     Balance  Sitting Balance: Modified independent   Standing Balance: Stand by assistance   Standing Balance  Time: AM: 1 min X2  Activity: AM: functional mobility, ADLs  Comment: with RW. no LOB noted. Functional Mobility  Functional - Mobility Device: Rolling Walker  Activity: To/from bathroom  Assist Level: Stand by assistance  Functional Mobility Comments: Verbal cues for hand placement and safety     Bed mobility  Bridging: Stand by assistance  Rolling to Left: Supervision  Rolling to Right: Supervision  Supine to Sit: Supervision  Sit to Supine: Supervision  Scooting: Modified independent  Comment: HOB slightly elevated  Transfers  Sit to stand: Stand by assistance  Stand to sit: Stand by assistance  Transfer Comments: Verbal cues for hand placement and safety   Toilet Transfers  Toilet - Technique: Ambulating  Equipment Used: Raised toilet seat with rails  Toilet Transfer: Stand by assistance  Toilet Transfers Comments: grab rail      Shower Transfers  Shower - Transfer From: Walker  Shower - Transfer Type: To and From  Shower - Transfer To: Transfer tub bench  Shower - Technique: Ambulating  Shower Transfers: Contact Guard  Shower Transfers Comments: Increased time with verbal cues for safety over threshold. SPEECH:  Subjective: [x]? Alert     [x]? Cooperative     []? Confused     []? Agitated    []? Lethargic     Objective/Assessment:  Attention: Functional throughout     Recall: Paragraph retention: reading silently to self, distractions in room minimized:   Immediate recall- 100% accuracy anthony        Problem Solving/Reasoning: NA     Other: Significant pt ed provided throughout session re use of external memory aides to assist in functional recall.  (specific cell phone applications). Pt verbalized understanding of all recommendations. Current Medications:   Current Facility-Administered Medications: insulin glargine (LANTUS) injection vial 53 Units, 53 Units, Subcutaneous, BID  sodium citrate 4 % injection 1.3 mL, 1.3 mL, Intracatheter, PRN  sodium citrate 4 % injection 1.3 mL, 1.3 mL, Intracatheter, PRN  traZODone (DESYREL) tablet 100 mg, 100 mg, Oral, Nightly  sodium chloride (OCEAN, BABY AYR) 0.65 % nasal spray 1 spray, 1 spray, Each Nostril, PRN  acetaminophen (TYLENOL) tablet 650 mg, 650 mg, Oral, Q4H PRN  polyethylene glycol (GLYCOLAX) packet 17 g, 17 g, Oral, Daily  senna (SENOKOT) tablet 17.2 mg, 2 tablet, Oral, Daily PRN  bisacodyl (DULCOLAX) suppository 10 mg, 10 mg, Rectal, Daily PRN  febuxostat (ULORIC) tablet 40 mg, 40 mg, Oral, Daily  tamsulosin (FLOMAX) capsule 0.4 mg, 0.4 mg, Oral, Daily  gabapentin (NEURONTIN) capsule 300 mg, 300 mg, Oral, BID  furosemide (LASIX) tablet 80 mg, 80 mg, Oral, BID  pantoprazole (PROTONIX) tablet 40 mg, 40 mg, Oral, QAM AC  ranolazine (RANEXA) extended release tablet 1,000 mg, 1,000 mg, Oral, BID  insulin lispro (HUMALOG) injection vial 0-12 Units, 0-12 Units, Subcutaneous, TID WC  insulin lispro (HUMALOG) injection vial 0-6 Units, 0-6 Units, Subcutaneous, Nightly  glucose (GLUTOSE) 40 % oral gel 15 g, 15 g, Oral, PRN  dextrose 50 % IV solution, 12.5 g, Intravenous, PRN  glucagon (rDNA) injection 1 mg, 1 mg, Intramuscular, PRN  dextrose 5 % solution, 100 mL/hr, Intravenous, PRN  apixaban (ELIQUIS) tablet 5 mg, 5 mg, Oral, BID  aspirin chewable tablet 81 mg, 81 mg, Oral, Daily  atorvastatin (LIPITOR) tablet 80 mg, 80 mg, Oral, Nightly  busPIRone (BUSPAR) tablet 7.5 mg, 7.5 mg, Oral, TID  carvedilol (COREG) tablet 6.25 mg, 6.25 mg, Oral, BID WC  ferrous sulfate (IRON 325) tablet 325 mg, 325 mg, Oral, BID WC      Objective:  BP (!) 128/56   Pulse 64   Temp 98.1 °F (36.7 °C) (Oral)   Resp 18   Ht 5' 5\" (1.651 m)   Wt 232 lb 9.4 oz (105.5 kg)   SpO2 92%   BMI 38.70 kg/m²       GEN: Well developed, well nourished, no acute distress  HEENT: NCAT. EOM grossly intact. Hearing grossly intact. Mucous membranes pink and moist.  RESP: Normal breath sounds with no wheezing, rales, or rhonchi. Respirations WNL and unlabored. CV: Regular rate and rhythm. No murmurs, rubs, or gallops. ABD: Soft, non-distended, BS+ and equal.  NEURO: Alert. Speech fluent. Sensation to light touch intact. MSK:  Muscle tone and bulk are normal bilaterally. Strength 4+/5 in right upper limb and 5/5 in left upper limb. Strength 4+/5 in bilateral lower limbs. LIMBS:  Edema present in bilateral lower limbs. SKIN: Warm and dry with good turgor. PSYCH: Mood WNL. Affect WNL. Appropriately interactive. Diagnostics:     CBC: Recent Labs     08/19/21  0547   WBC 7.2   RBC 3.08*   HGB 9.8*   HCT 29.7*   MCV 96.4   RDW 16.8*        BMP:   Recent Labs     08/19/21  0547 08/20/21  0702   * 139   K 3.8 4.8   CL 97* 100   CO2 24 28   PHOS  --  5.0*   BUN 32* 44*   CREATININE 2.65* 3.44*   GLUCOSE 83 276*     BNP: No results for input(s): BNP in the last 72 hours. PT/INR: No results for input(s): PROTIME, INR in the last 72 hours. APTT: No results for input(s): APTT in the last 72 hours. CARDIAC ENZYMES: No results for input(s): CKMB, CKMBINDEX, TROPONINT in the last 72 hours. Invalid input(s): CKTOTAL;3  FASTING LIPID PANEL:  Lab Results   Component Value Date    CHOL 105 04/09/2015    HDL 43 04/09/2015    TRIG 168 04/09/2015     LIVER PROFILE: No results for input(s): AST, ALT, ALB, BILIDIR, BILITOT, ALKPHOS in the last 72 hours. Impression/Plan:   Impaired ADLs, gait, and mobility due to:    1. Ischemic CVA L frontal lobe with mild R dominant hemiparesis:  PT/OT for gait, mobility, strengthening, endurance, ADLs, and self care. On ASA, atorvastatin  2. HTN/CAD/Angina/Diastolic CHF: on carvedilol, furosemide, Ranexa  3. Insomnia: on Trazodone - was taking 75 mg at home - increased to 100 mg on 8/12.  Sleep improved. 4. Anxiety: on buspirone  5. DM with neuropathy: on lantus, sliding scale, gabapentin  6. AURELIA on CKD IV: Initiated on HD at [address]. Nephrology following - continuing lasix at 80 mg BID. For HD MWF. IT placed tunneled cath 8/18.  7. Anemia of chronic disease: on ferrous sulfate. Had IV iron at [facility]. Hb low but stable. Monitoring routinely  8. Chronic neck and back pain: pain stable. Has prn Tylenol. Ice prn.   9. Urine retention: on flomax  10. Bowel Management: Miralax daily, senokot prn, dulcolax prn.   11. DVT Prophylaxis:  SCD's while in bed, AGUSTIN's and Eliquis  12.  Internal Medicine for medical management      Electronically signed by Prakash Prieto MD on 8/21/2021 at 10:17 AM

## 2021-08-21 NOTE — PROGRESS NOTES
[facility] [facility]    ACUTE REHABILITATION OCCUPATIONAL THERAPY  DAILY NOTE    Date: 21  Patient Name: Robert Frost      Room: 1296/5476-02    MRN: [id number]   : 1960  (59 y.o.)  Gender: female   Referring Practitioner: Gloria Garcia MD  Diagnosis: CVA  Additional Pertinent Hx: Presented with acute right arm weakness and mental status change. MRI brain showed probable acute to subacute lacunar infarct of the left frontal lobe. AURELIA on CKD stage 4 likely d/t contrast induced nephropathy. Dialysis was initiated on 21, had 2 treatment session, labs improving. Pt. has history of diabetes mellitus type II, CAD s/p CABG (), cervical stenosis, back pain, lymphedema, diastolic CHF, DVT, urinary retention, and iron deficiency anemia. Pt. admitted to ARU from [place] [id number] 21. Restrictions  Restrictions/Precautions: Fall Risk  Required Braces or Orthoses  Right Lower Extremity Brace:  (Lymphedema brace )  Left Lower Extremity Brace:  (Lymphedema brace )  Required Braces or Orthoses?: Yes (B lymphedema wraps)    Subjective  Subjective: \"I had a rough night\" \"My feet just hurt\"  Comments: Pt is pleasant and cooperative.    Patient Currently in Pain: Yes  Pain Level: 8  Pain Location: Leg;Foot;Neck  Pain Orientation: Right;Left  Restrictions/Precautions: Fall Risk  Overall Orientation Status: Within Functional Limits     Pain Assessment  Pain Assessment: 0-10  Pain Level: 8  Pain Type: Acute pain, Chronic pain  Pain Location: Leg, Foot, Neck  Pain Orientation: Right, Left    Objective  Cognition  Overall Cognitive Status: WFL  Perception  Overall Perceptual Status: WFL  Balance  Sitting Balance: Modified independent   Standing Balance: Stand by assistance  Transfers  Sit to stand: Stand by assistance  Stand to sit: Stand by assistance  Transfer Comments: Verbal cues for hand placement and safety  Standing Balance  Time: AM: 1 min X2, 30 sec; PM: 30 sec  Activity: AM: functional mobility, ADLs; PM: functional mobility to w/c  Comment: with RW. no LOB noted. Functional Mobility  Functional - Mobility Device: Rolling Walker  Activity: To/from bathroom  Assist Level: Stand by assistance  Functional Mobility Comments: Verbal cues for hand placement and safety  Shower Transfers  Shower - Transfer From: Cassius Loss - Transfer Type: To and From  Shower - Transfer To: Transfer tub bench  Shower - Technique: Ambulating  Shower Transfers: Contact Guard  Shower Transfers Comments: Increased time with verbal cues for safety over threshold. Type of ROM/Therapeutic Exercise  Type of ROM/Therapeutic Exercise: Free weights (R: 2# L: 3#, x20 reps BUE)  Comment: Ex's with improved tolerance from previous date, weight tolerated on R side this date although lighter than L side due to some continued soreness from port placement. Pt motivated to complete ex's to tolerance for increased strength and overall conditioning for functional task completion. Exercises  Scapular Protraction: x  Scapular Retraction: x  Shoulder Flexion: x  Shoulder Extension: x  Shoulder ABduction: x  Shoulder ADduction: x  Horizontal ABduction: x RUE 10 only  Horizontal ADduction: x RUE 10 only  Elbow Flexion: x  Elbow Extension: x  Supination: x  Pronation: x  Wrist Flexion: x  Wrist Extension: x                       ADL  Feeding: Modified independent   Grooming: Modified independent  (seated at w/c)  UE Bathing: Setup; Increased time to complete (seated on tub bench)  LE Bathing: Minimal assistance (A washing buttocks standing at grab bar. )  UE Dressing: Modified independent   LE Dressing: Minimal assistance (A with lymphedema socks/braces only. )  Toileting: None (Pt req no need at this time)  Additional Comments: GARCIA facilitates pt in full shower this AM seated on tub bench. Pt requries increased assist with donning lymphedema socks and braces due to increased fatigue and pain in B feet.  Is able to thread but requires assist with tightening. Able to maria de jesus non slip socks with sock aide. Reports she has a scks aide at home to assist with lymphendema socks. VC for having RW place in front prior to standing to pull pants up for increased safety. Assessment  Performance deficits / Impairments: Decreased ADL status; Decreased functional mobility ; Decreased strength;Decreased safe awareness;Decreased cognition;Decreased endurance;Decreased balance;Decreased high-level IADLs;Decreased coordination  Prognosis: Good  Discharge Recommendations: Patient would benefit from continued therapy after discharge;Home with assist PRN  Activity Tolerance: Patient Tolerated treatment well  Safety Devices in place: Yes  Type of devices: Left in chair;Call light within reach; All fall risk precautions in place  Equipment Recommendations  Equipment Needed: Yes  Reacher: x  Sock Aid: x       Patient Education: OT POC, safety with standing and having RW ready prior to standing for decreased fall risk with dynamic standing, non skid footwear for functional mobility, energy conservation during ADLs and functional tasks. Patient Goals   Patient goals : \"I would like to have energy and balance and to just be able to walk around and go to the store and walk around. \"   Learner:patient  Method: explanation       Outcome: acknowledged understanding of         Plan  Plan  Times per week: 5-7  Times per day: Twice a day  Current Treatment Recommendations: Self-Care / ADL, Strengthening, ROM, Balance Training, Functional Mobility Training, Endurance Training, Neuromuscular Re-education, Cognitive Reorientation, Pain Management, Safety Education & Training, Patient/Caregiver Education & Training, Equipment Evaluation, Education, & procurement, Home Management Training, Cognitive/Perceptual Training  Patient Goals   Patient goals : \"I would like to have energy and balance and to just be able to walk around and go to the store and walk around. \"   Short term goals  Time Frame for Short term goals: By 5-6 days  Short term goal 1: Pt will complete lower body dressing with CGA and good safety  Short term goal 2: Pt will complete functional trasnfers/mobility during self care tasks with supervision and good safety with use of least restrictive device  Short term goal 3: Pt will tolerate standing 5+ minutes during functional activity of choice with good safety   Short term goal 4: Pt will verbalize/demonstrate good understanding of energy conservation techniques to increase safety and independence with self care tasks  Short term goal 5: Pt will participate in 15+ minutes of therapeutic exercises/functional activities to increase safety and independence with self care tasks. Long term goals  Time Frame for Long term goals : By discharge  Long term goal 1: Pt will complete BADLs with modified independence and good safety  Long term goal 2: Pt will complete functional transfers/mobility during self care tasks with modified independence and good safety with use of least restrictive device  Long term goal 3: Pt will tolerate standing 8+ minutes during functional activity of choice with good safety  Long term goal 4: Pt will complete simple meal prep/light house keeping task with modified independence and good safety   Long term goal 5: Pt will verbalize/demonstrate good understanding of home safety/fall prevention to increase safety and independence with self care tasks.    Long term goals 6: Pt will demonstrated increased  strength and coordination during self care tasks as evident by and an improvement on the 9 hole peg test by 3 seconds and increased  strength by 5#        08/21/21 1132 08/21/21 1546   OT Individual Minutes   Time In 0731 1230   Time Out 0854 1300   Minutes 83 30     Electronically signed by DAYANA Moon on 8/21/21 at 3:47 PM EDT

## 2021-08-21 NOTE — PLAN OF CARE
Problem: Falls - Risk of:  Goal: Will remain free from falls  Description: Will remain free from falls  8/21/2021 0302 by Rd Borjas RN  Outcome: Ongoing  Goal: Absence of physical injury  Description: Absence of physical injury  Outcome: Ongoing     Problem: Skin Integrity:  Goal: Will show no infection signs and symptoms  Description: Will show no infection signs and symptoms  Outcome: Ongoing  Goal: Absence of new skin breakdown  Description: Absence of new skin breakdown  Outcome: Ongoing     Problem: Pain:  Goal: Pain level will decrease  Description: Pain level will decrease  8/21/2021 0302 by Rd Borjas RN  Outcome: Ongoing    Goal: Control of acute pain  Description: Control of acute pain  Outcome: Ongoing  Goal: Control of chronic pain  Description: Control of chronic pain  Outcome: Ongoing     Problem: Musculor/Skeletal Functional Status  Goal: Highest potential functional level  8/21/2021 0302 by Rd Borjas RN  Outcome: Ongoing  Goal: Absence of falls  Outcome: Ongoing     Problem: Nutrition  Goal: Optimal nutrition therapy  Outcome: Ongoing     Problem:  Activity:  Goal: Fatigue will decrease  Description: Fatigue will decrease  8/21/2021 0302 by Rd Borjas RN  Outcome: Ongoing  Goal: Risk for activity intolerance will decrease  Description: Risk for activity intolerance will decrease  Outcome: Ongoing     Problem: Fluid Volume:  Goal: Will show no signs or symptoms of fluid imbalance  Description: Will show no signs or symptoms of fluid imbalance  Outcome: Ongoing

## 2021-08-21 NOTE — PROGRESS NOTES
Speech Language Pathology  Speech Language Pathology  [facility] Acute Rehab Unit at [facility]    Cognitive Treatment Note    Date: 8/21/2021  Patients Name: Celia Lundberg  MRN: [id number]  Diagnosis:   Patient Active Problem List   Diagnosis Code    Coronary artery disease I25.10    Chronic diastolic heart failure (HCC) I50.32    Diabetic polyneuropathy associated with type 2 diabetes mellitus (HCC) E11.42    History of coronary artery bypass graft Z95.1    Iron deficiency anemia D50.9    Spinal stenosis of lumbar region with neurogenic claudication M48.062    Mixed hyperlipidemia E78.2    Stage 4 chronic kidney disease ([facility] [address] N18.4    Type 2 diabetes mellitus with kidney complication, with long-term current use of insulin ([facility]) E11.29, Z79.4    Syncope and collapse R55    Obesity, Class II, BMI 35-39.9 E66.9    Thyroid nodule greater than or equal to 1 cm in diameter incidentally noted on imaging study E04.1    Essential hypertension I10    Anemia in stage 4 chronic kidney disease (HCC) N18.4, D63.1    Chronic ischemic heart disease I25.9    Acute cerebrovascular accident (CVA) ([facility] [address] I63.9       Pain: 5/10    Cognitive Treatment    Treatment time: 6615-7357    Subjective: [x] Alert [x] Cooperative     [] Confused     [] Agitated    [] Lethargic    Objective/Assessment:  Attention: Functional throughout    Recall: Paragraph retention: reading silently to self, distractions in room minimized:   Immediate recall- 100% accuracy anthony      Problem Solving/Reasoning: NA    Other: Significant pt ed provided throughout session re use of external memory aides to assist in functional recall.  (specific cell phone applications). Pt verbalized understanding of all recommendations.       Plan:  [x] Continue ST services    [] Discharge from ST:      Discharge recommendations: [] Inpatient Rehab   [] [place]   [] Outpatient Therapy  [] Follow up at trauma clinic   [] Other:       Treatment completed by: Brian SPRINGER A.CCC/SLP

## 2021-08-21 NOTE — PLAN OF CARE
Problem: Falls - Risk of:  Goal: Will remain free from falls  Description: Will remain free from falls  8/21/2021 1229 by Margarita Wilson RN  Outcome: Ongoing  8/21/2021 0302 by Khris Escalera RN  Outcome: Ongoing  Goal: Absence of physical injury  Description: Absence of physical injury  8/21/2021 1229 by Margarita Wilson RN  Outcome: Ongoing  8/21/2021 0302 by Khris Escalera RN  Outcome: Ongoing     Problem: Skin Integrity:  Goal: Will show no infection signs and symptoms  Description: Will show no infection signs and symptoms  8/21/2021 1229 by Margarita Wilson RN  Outcome: Ongoing  8/21/2021 0302 by Khris Escalera RN  Outcome: Ongoing  Goal: Absence of new skin breakdown  Description: Absence of new skin breakdown  8/21/2021 1229 by Margarita Wilson RN  Outcome: Ongoing  8/21/2021 0302 by Khris Escalera RN  Outcome: Ongoing     Problem: Pain:  Goal: Pain level will decrease  Description: Pain level will decrease  8/21/2021 1229 by Margarita Wilson RN  Outcome: Ongoing  8/21/2021 0302 by Khris Escalera RN  Outcome: Ongoing  Goal: Control of acute pain  Description: Control of acute pain  8/21/2021 1229 by Margarita Wilson RN  Outcome: Ongoing  8/21/2021 0302 by Khris Escalera RN  Outcome: Ongoing  Goal: Control of chronic pain  Description: Control of chronic pain  8/21/2021 1229 by Margarita Wilson RN  Outcome: Ongoing  8/21/2021 0302 by Khris Escalera RN  Outcome: Ongoing     Problem: Musculor/Skeletal Functional Status  Goal: Highest potential functional level  8/21/2021 1229 by Margarita Wilson RN  Outcome: Ongoing  8/21/2021 0302 by Khris Escalera RN  Outcome: Ongoing  Goal: Absence of falls  8/21/2021 1229 by Margarita Wilson RN  Outcome: Ongoing  8/21/2021 0302 by Khris Escalera RN  Outcome: Ongoing     Problem: Nutrition  Goal: Optimal nutrition therapy  8/21/2021 1229 by Margarita Wilson RN  Outcome: Ongoing  8/21/2021 0302 by Khris Escalera RN  Outcome: Ongoing     Problem:  Activity:  Goal: Fatigue will decrease  Description: Fatigue will decrease  8/21/2021 1229 by Daphnie Loera RN  Outcome: Ongoing  8/21/2021 0302 by Bonny Bran RN  Outcome: Ongoing  Goal: Risk for activity intolerance will decrease  Description: Risk for activity intolerance will decrease  8/21/2021 1229 by Daphnie Loera RN  Outcome: Ongoing  8/21/2021 0302 by Bonny Bran RN  Outcome: Ongoing     Problem: Fluid Volume:  Goal: Will show no signs or symptoms of fluid imbalance  Description: Will show no signs or symptoms of fluid imbalance  8/21/2021 1229 by Daphnie Loera RN  Outcome: Ongoing  8/21/2021 0302 by Bonny Bran RN  Outcome: Ongoing

## 2021-08-21 NOTE — PROGRESS NOTES
Physical Therapy  Facility/Department: [facility] ACUTE REHAB  Daily Treatment Note  NAME: Miguel Angel Gunter  : 1960  MRN: [id number]    Date of Service: 2021    Discharge Recommendations:  Patient would benefit from continued therapy after discharge, Home with assist PRN   PT Equipment Recommendations  Equipment Needed: No    Assessment   Body structures, Functions, Activity limitations: Decreased functional mobility ; Decreased strength;Decreased endurance;Decreased balance; Increased pain  Treatment Diagnosis: CVA  Specific instructions for Next Treatment: Balance training, transfer training, gait training  REQUIRES PT FOLLOW UP: Yes  Activity Tolerance  Activity Tolerance: Patient Tolerated treatment well;Patient limited by endurance; Patient limited by fatigue     Patient Diagnosis(es): There were no encounter diagnoses. has a past medical history of Backache, unspecified, CHF (congestive heart failure) ([facility] [address] Chronic kidney disease, Coronary atherosclerosis of artery bypass graft, Cramp of limb, Gallstones, Hypertension, Insomnia, Pneumonia, Type II or unspecified type diabetes mellitus with renal manifestations, not stated as uncontrolled(250.40), Type II or unspecified type diabetes mellitus without mention of complication, not stated as uncontrolled, and Unspecified vitamin D deficiency. has a past surgical history that includes Coronary artery bypass graft; Knee arthroscopy; Carpal tunnel release; Breast surgery; Tonsillectomy; Hand surgery; Ankle fracture surgery; Cholecystectomy, open (N/A); and IR TUNNELED CVC PLACE WO SQ PORT/PUMP > 5 YEARS (2021). Restrictions  Restrictions/Precautions  Restrictions/Precautions: Fall Risk  Required Braces or Orthoses?: Yes (B lymphedema wraps)  Required Braces or Orthoses  Right Lower Extremity Brace:  (Lymphedema brace )  Left Lower Extremity Brace:  (Lymphedema brace )  Subjective   General  Chart Reviewed:  Yes  Additional Pertinent Hx: Presented with acute right arm weakness and mental status change. MRI brain showed probable acute to subacute lacunar infarct of the left frontal lobe. AURELIA on CKD stage 4 likely d/t contrast induced nephropathy. Dialysis was initiated on 8/6/21, had 2 treatment session, labs improving. Pt. has history of diabetes mellitus type II, CAD s/p CABG (2005), cervical stenosis, back pain, lymphedema, diastolic CHF, DVT, urinary retention, and iron deficiency anemia. Pt. admitted to ARU from [place] [address] 8/11/21. Response To Previous Treatment: Patient with no complaints from previous session. Family / Caregiver Present: No  Referring Practitioner: Dr. Hanna Coon: Patient eager and motivated for therapy; stated she did not sleep well throughout the night   Pain Screening  Patient Currently in Pain: Yes  Pain Assessment  Pain Assessment: 0-10  Pain Level: 4  Pain Type: Acute pain  Pain Location: Foot;Leg  Pain Orientation: Right;Left  Vital Signs  Patient Currently in Pain: Yes       Orientation  Orientation  Overall Orientation Status: Within Normal Limits  Objective      Transfers  Sit to Stand: Supervision  Stand to sit: Supervision  Bed to Chair: Supervision  Stand Pivot Transfers: Supervision  Comment: Transfers assessed with rolling walker. Steady, no LOB noted  Ambulation  Ambulation?: Yes  Ambulation 1  Surface: level tile  Device: Rollator  Assistance: Stand by assistance  Quality of Gait: no LOB noted. Decrease step length, increased LLE shakiness. Gait Deviations: Slow Annie;Decreased step height;Decreased step length; Increased ELISABET  Distance: 210ft x2  Comments: patient ambulated to and from therapy gym with 1 seated rest break during distances   Stairs/Curb  Stairs?: Yes  Stairs  # Steps : 10 (x2)  Stairs Height:  (4\"/6\")  Rails: Bilateral  Device: No Device  Assistance: Supervision  Comment: patient performs step to pattern; seated rest break aftre 10 steps         Other exercises  Other exercises?: Yes  Other exercises 2: seated B LE exercises x20 reps #3 and blue tband   Other exercises 3: seated UBE 5 minutes FWD/BWD   Other exercises 4: NuStep L4 x15 minutes   Other exercises 5: stand pivot transfer from WC to NuStep x2          Comment: rest breaks PRN. Goals  Short term goals  Time Frame for Short term goals: 5 days   Short term goal 1: Pt. to perform bed mobility independently. Short term goal 2: Pt. to tolerate 30 to 45 minutes of therapeutic exercise to improve strength and endurance for increased functional mobility. Short term goal 3: Pt. to improve seated static and dynamic balance to good. Short term goal 4: Pt. to improve standing static balance to fair+ and standing dynamic balance to fair. Short term goal 5: Pt. to ambulate 200ft. with supervision using a rollator. Short term goal 6: Pt. to ascend/descend 3 steps with CGA using one railing  Long term goals  Time Frame for Long term goals : By DC  Long term goal 1: Pt. to perform all transfers independently or with Mod-I. Long term goal 2: Pt. to improve standing static and dynamic balance to good. Long term goal 3: Pt. to ambulate 100ft. on an unlevel/ inclined surface with Mod-I using a rollator. Long term goal 4: Pt. to ascend/descend one flight of stairs with supervision   Long term goal 5: Pt. to ambulate 200ft. in 2 minutes with Mod-I using a rollator. Long term goal 6: Pt. to improve PASS score from 24/36 to 35/36. Patient Goals   Patient goals : Improve balance and strength to be able to walk independently with rollator.      Plan    Plan  Times per week: 1.5hrs/day  Times per day: Daily  Specific instructions for Next Treatment: Balance training, transfer training, gait training  Current Treatment Recommendations: Strengthening, Balance Training, Transfer Training, Functional Mobility Training, ROM, Endurance Training, Gait Training, Stair training, Pain Management, Home Exercise Program, Safety Education & Training, Neuromuscular Re-education, Patient/Caregiver Education & Training, Equipment Evaluation, Education, & procurement  Safety Devices  Type of devices:  All fall risk precautions in place, Call light within reach, Gait belt, Patient at risk for falls, Nurse notified        08/21/21 1344 08/21/21 1358   PT Individual Minutes   Time In    Time Out [id number]   Minutes 59 35     Electronically signed by Cj Douglas PTA on 8/21/21 at 2:00 PM EDT

## 2021-08-22 LAB
GLUCOSE BLD-MCNC: 145 MG/DL (ref 65–105)
GLUCOSE BLD-MCNC: 224 MG/DL (ref 65–105)
GLUCOSE BLD-MCNC: 252 MG/DL (ref 65–105)
GLUCOSE BLD-MCNC: 277 MG/DL (ref 65–105)

## 2021-08-22 PROCEDURE — 82947 ASSAY GLUCOSE BLOOD QUANT: CPT

## 2021-08-22 PROCEDURE — 6370000000 HC RX 637 (ALT 250 FOR IP): Performed by: PHYSICAL MEDICINE & REHABILITATION

## 2021-08-22 PROCEDURE — 97116 GAIT TRAINING THERAPY: CPT

## 2021-08-22 PROCEDURE — 1180000000 HC REHAB R&B

## 2021-08-22 PROCEDURE — 99232 SBSQ HOSP IP/OBS MODERATE 35: CPT | Performed by: STUDENT IN AN ORGANIZED HEALTH CARE EDUCATION/TRAINING PROGRAM

## 2021-08-22 PROCEDURE — 97110 THERAPEUTIC EXERCISES: CPT

## 2021-08-22 PROCEDURE — 6370000000 HC RX 637 (ALT 250 FOR IP): Performed by: INTERNAL MEDICINE

## 2021-08-22 PROCEDURE — 99232 SBSQ HOSP IP/OBS MODERATE 35: CPT | Performed by: INTERNAL MEDICINE

## 2021-08-22 PROCEDURE — 97530 THERAPEUTIC ACTIVITIES: CPT

## 2021-08-22 RX ADMIN — BUSPIRONE HYDROCHLORIDE 7.5 MG: 7.5 TABLET ORAL at 08:13

## 2021-08-22 RX ADMIN — BUSPIRONE HYDROCHLORIDE 7.5 MG: 7.5 TABLET ORAL at 20:45

## 2021-08-22 RX ADMIN — FEBUXOSTAT 40 MG: 40 TABLET, FILM COATED ORAL at 08:15

## 2021-08-22 RX ADMIN — CARVEDILOL 6.25 MG: 6.25 TABLET, FILM COATED ORAL at 08:12

## 2021-08-22 RX ADMIN — TAMSULOSIN HYDROCHLORIDE 0.4 MG: 0.4 CAPSULE ORAL at 08:11

## 2021-08-22 RX ADMIN — APIXABAN 5 MG: 5 TABLET, FILM COATED ORAL at 08:11

## 2021-08-22 RX ADMIN — FUROSEMIDE 80 MG: 40 TABLET ORAL at 16:23

## 2021-08-22 RX ADMIN — BUSPIRONE HYDROCHLORIDE 7.5 MG: 7.5 TABLET ORAL at 14:00

## 2021-08-22 RX ADMIN — INSULIN LISPRO 3 UNITS: 100 INJECTION, SOLUTION INTRAVENOUS; SUBCUTANEOUS at 20:44

## 2021-08-22 RX ADMIN — INSULIN LISPRO 6 UNITS: 100 INJECTION, SOLUTION INTRAVENOUS; SUBCUTANEOUS at 11:42

## 2021-08-22 RX ADMIN — FUROSEMIDE 80 MG: 40 TABLET ORAL at 08:11

## 2021-08-22 RX ADMIN — RANOLAZINE 1000 MG: 1000 TABLET, FILM COATED, EXTENDED RELEASE ORAL at 08:14

## 2021-08-22 RX ADMIN — INSULIN GLARGINE 53 UNITS: 100 INJECTION, SOLUTION SUBCUTANEOUS at 09:05

## 2021-08-22 RX ADMIN — GABAPENTIN 300 MG: 300 CAPSULE ORAL at 08:12

## 2021-08-22 RX ADMIN — APIXABAN 5 MG: 5 TABLET, FILM COATED ORAL at 20:45

## 2021-08-22 RX ADMIN — INSULIN LISPRO 2 UNITS: 100 INJECTION, SOLUTION INTRAVENOUS; SUBCUTANEOUS at 09:03

## 2021-08-22 RX ADMIN — PANTOPRAZOLE SODIUM 40 MG: 40 TABLET, DELAYED RELEASE ORAL at 05:50

## 2021-08-22 RX ADMIN — ASPIRIN 81 MG: 81 TABLET, CHEWABLE ORAL at 08:11

## 2021-08-22 RX ADMIN — ATORVASTATIN CALCIUM 80 MG: 80 TABLET, FILM COATED ORAL at 20:46

## 2021-08-22 RX ADMIN — RANOLAZINE 1000 MG: 1000 TABLET, FILM COATED, EXTENDED RELEASE ORAL at 20:45

## 2021-08-22 RX ADMIN — INSULIN GLARGINE 53 UNITS: 100 INJECTION, SOLUTION SUBCUTANEOUS at 20:45

## 2021-08-22 RX ADMIN — GABAPENTIN 300 MG: 300 CAPSULE ORAL at 20:46

## 2021-08-22 RX ADMIN — INSULIN LISPRO 4 UNITS: 100 INJECTION, SOLUTION INTRAVENOUS; SUBCUTANEOUS at 16:23

## 2021-08-22 RX ADMIN — TRAZODONE HYDROCHLORIDE 100 MG: 100 TABLET ORAL at 20:46

## 2021-08-22 RX ADMIN — CARVEDILOL 6.25 MG: 6.25 TABLET, FILM COATED ORAL at 16:23

## 2021-08-22 ASSESSMENT — PAIN SCALES - GENERAL
PAINLEVEL_OUTOF10: 0

## 2021-08-22 NOTE — PROGRESS NOTES
Physical Therapy  Facility/Department: [facility] ACUTE REHAB  Daily Treatment Note  NAME: Ag Maria  : 1960  MRN: [id number]    Date of Service: 2021    Discharge Recommendations:  Patient would benefit from continued therapy after discharge, Home with assist PRN   PT Equipment Recommendations  Equipment Needed: No    Assessment   Body structures, Functions, Activity limitations: Decreased functional mobility ; Decreased strength;Decreased endurance;Decreased balance; Increased pain  Treatment Diagnosis: CVA  Specific instructions for Next Treatment: Balance training, transfer training, gait training  REQUIRES PT FOLLOW UP: Yes  Activity Tolerance  Activity Tolerance: Patient Tolerated treatment well;Patient limited by endurance; Patient limited by fatigue     Patient Diagnosis(es): There were no encounter diagnoses. has a past medical history of Backache, unspecified, CHF (congestive heart failure) ([facility] [address] Chronic kidney disease, Coronary atherosclerosis of artery bypass graft, Cramp of limb, Gallstones, Hypertension, Insomnia, Pneumonia, Type II or unspecified type diabetes mellitus with renal manifestations, not stated as uncontrolled(250.40), Type II or unspecified type diabetes mellitus without mention of complication, not stated as uncontrolled, and Unspecified vitamin D deficiency. has a past surgical history that includes Coronary artery bypass graft; Knee arthroscopy; Carpal tunnel release; Breast surgery; Tonsillectomy; Hand surgery; Ankle fracture surgery; Cholecystectomy, open (N/A); and IR TUNNELED CVC PLACE WO SQ PORT/PUMP > 5 YEARS (2021). Restrictions  Restrictions/Precautions  Restrictions/Precautions: Fall Risk  Required Braces or Orthoses?: Yes (Lymphedema braces/wraps )  Required Braces or Orthoses  Right Lower Extremity Brace:  (Lymphedema brace )  Left Lower Extremity Brace:  (Lymphedema brace )  Subjective   General  Chart Reviewed:  Yes  Additional Pertinent Hx: Presented with acute right arm weakness and mental status change. MRI brain showed probable acute to subacute lacunar infarct of the left frontal lobe. AURELIA on CKD stage 4 likely d/t contrast induced nephropathy. Dialysis was initiated on 8/6/21, had 2 treatment session, labs improving. Pt. has history of diabetes mellitus type II, CAD s/p CABG (2005), cervical stenosis, back pain, lymphedema, diastolic CHF, DVT, urinary retention, and iron deficiency anemia. Pt. admitted to ARU from [place] [address] 8/11/21. Response To Previous Treatment: Patient with no complaints from previous session. Family / Caregiver Present: No  Referring Practitioner: Dr. Shruti Ingram: Patient eager and motivated for therapy; stated she did not sleep well throughout the night   Pain Screening  Patient Currently in Pain: Denies  Vital Signs  Patient Currently in Pain: Denies       Orientation  Orientation  Overall Orientation Status: Within Normal Limits  Objective      Transfers  Sit to Stand: Supervision  Stand to sit: Supervision  Bed to Chair: Supervision  Stand Pivot Transfers: Supervision  Comment: Transfers assessed with rolling walker. Steady, no LOB noted  Ambulation  Ambulation?: Yes  Ambulation 2  Surface - 2: level tile;ramp  Device 2: Rollator  Assistance 2: Supervision  Quality of Gait 2: Slightly unsteady, no LOB noted. Decrease step length, increased LLE shakiness. Gait Deviations: Slow Annie;Decreased step height;Decreased step length; Increased ELISABET  Distance: 120ft incrinments total of 463ft  Comments: seated rest breaks on rollator between distances d/t increased SOB   Stairs/Curb  Stairs?: Yes  Stairs  # Steps : 10 (x2)  Stairs Height:  (4\"/6\")  Rails: Bilateral  Device: No Device  Assistance: Modified independent   Comment: patient performs step over step; seated rest break aftre 10 steps         Other exercises  Other exercises?: Yes  Other exercises 2: seated B LE exercises x20 reps #3 and blue tband   Other exercises 3: seated UBE 5 minutes FWD/BWD   Other exercises 4: NuStep L4 x15 minutes          Comment: rest breaks PRN. Goals  Short term goals  Time Frame for Short term goals: 5 days   Short term goal 1: Pt. to perform bed mobility independently. Short term goal 2: Pt. to tolerate 30 to 45 minutes of therapeutic exercise to improve strength and endurance for increased functional mobility. Short term goal 3: Pt. to improve seated static and dynamic balance to good. Short term goal 4: Pt. to improve standing static balance to fair+ and standing dynamic balance to fair. Short term goal 5: Pt. to ambulate 200ft. with supervision using a rollator. Short term goal 6: Pt. to ascend/descend 3 steps with CGA using one railing  Long term goals  Time Frame for Long term goals : By DC  Long term goal 1: Pt. to perform all transfers independently or with Mod-I. Long term goal 2: Pt. to improve standing static and dynamic balance to good. Long term goal 3: Pt. to ambulate 100ft. on an unlevel/ inclined surface with Mod-I using a rollator. Long term goal 4: Pt. to ascend/descend one flight of stairs with supervision   Long term goal 5: Pt. to ambulate 200ft. in 2 minutes with Mod-I using a rollator. Long term goal 6: Pt. to improve PASS score from 24/36 to 35/36. Patient Goals   Patient goals : Improve balance and strength to be able to walk independently with rollator.      Plan    Plan  Times per week: 1.5hrs/day  Times per day: Daily  Specific instructions for Next Treatment: Balance training, transfer training, gait training  Current Treatment Recommendations: Strengthening, Balance Training, Transfer Training, Functional Mobility Training, ROM, Endurance Training, Gait Training, Stair training, Pain Management, Home Exercise Program, Safety Education & Training, Neuromuscular Re-education, Patient/Caregiver Education & Training, Equipment Evaluation, Education, & procurement  Safety Devices  Type of devices:  All fall risk precautions in place, Call light within reach, Gait belt, Patient at risk for falls, Nurse notified     Therapy Time     08/22/21 1138 08/22/21 1344   PT Individual Minutes   Time In 0780 1300   Time Out [id number]   Minutes 56 34     Electronically signed by Yolette Ingram PTA on 8/22/21 at 1:47 PM EDT

## 2021-08-22 NOTE — PROGRESS NOTES
noted  Ambulation  Ambulation?: Yes  Ambulation 1  Surface: level tile  Device: Rollator  Assistance: Stand by assistance  Quality of Gait: no LOB noted. Decrease step length, increased LLE shakiness. Gait Deviations: Slow Annie, Decreased step height, Decreased step length, Increased ELISABET  Distance: 210ft x2  Comments: patient ambulated to and from therapy gym with 1 seated rest break during distances     Surface: level tile  Ambulation 1  Surface: level tile  Device: Rollator  Assistance: Stand by assistance  Quality of Gait: no LOB noted. Decrease step length, increased LLE shakiness. Gait Deviations: Slow Annie, Decreased step height, Decreased step length, Increased ELISABET  Distance: 210ft x2  Comments: patient ambulated to and from therapy gym with 1 seated rest break during distances     OT:  ADL  Equipment Provided: Reacher, Sock aid  Feeding: Modified independent   Grooming: Modified independent  (seated at w/c)  UE Bathing: Setup, Increased time to complete (seated on tub bench)  LE Bathing: Minimal assistance (A washing buttocks standing at grab bar. )  UE Dressing: Modified independent   LE Dressing: Minimal assistance (A with lymphedema socks/braces only. )  Toileting: None (Pt req no need at this time)  Additional Comments: GARCIA facilitates pt in full shower this AM seated on tub bench. Pt requries increased assist with donning lymphedema socks and braces due to increased fatigue and pain in B feet. Is able to thread but requries assist with tightening. Able to maria de jesus non slip socks with sock aide. Reports she has a scks aide at home to assist with lymphendema socks. VC for having RW place in front prior to standing to pull pants up for increased safety.     Instrumental ADL's  Instrumental ADLs: Yes     Balance  Sitting Balance: Modified independent   Standing Balance: Stand by assistance   Standing Balance  Time: AM: 1 min X2, 30 sec; PM: 30 sec  Activity: AM: functional mobility, ADLs; PM: functional mobility to w/c  Comment: with RW. no LOB noted. Functional Mobility  Functional - Mobility Device: Rolling Walker  Activity: To/from bathroom  Assist Level: Stand by assistance  Functional Mobility Comments: Verbal cues for hand placement and safety     Bed mobility  Bridging: Stand by assistance  Rolling to Left: Supervision  Rolling to Right: Supervision  Supine to Sit: Supervision  Sit to Supine: Supervision  Scooting: Modified independent  Comment: HOB slightly elevated  Transfers  Sit to stand: Stand by assistance  Stand to sit: Stand by assistance  Transfer Comments: Verbal cues for hand placement and safety   Toilet Transfers  Toilet - Technique: Ambulating  Equipment Used: Raised toilet seat with rails  Toilet Transfer: Stand by assistance  Toilet Transfers Comments: grab rail      Shower Transfers  Shower - Transfer From: Walker  Shower - Transfer Type: To and From  Shower - Transfer To: Transfer tub bench  Shower - Technique: Ambulating  Shower Transfers: Contact Guard  Shower Transfers Comments: Increased time with verbal cues for safety over threshold.         SPEECH:  None today    Current Medications:   Current Facility-Administered Medications: insulin glargine (LANTUS) injection vial 53 Units, 53 Units, Subcutaneous, BID  sodium citrate 4 % injection 1.3 mL, 1.3 mL, Intracatheter, PRN  sodium citrate 4 % injection 1.3 mL, 1.3 mL, Intracatheter, PRN  traZODone (DESYREL) tablet 100 mg, 100 mg, Oral, Nightly  sodium chloride (OCEAN, BABY AYR) 0.65 % nasal spray 1 spray, 1 spray, Each Nostril, PRN  acetaminophen (TYLENOL) tablet 650 mg, 650 mg, Oral, Q4H PRN  polyethylene glycol (GLYCOLAX) packet 17 g, 17 g, Oral, Daily  senna (SENOKOT) tablet 17.2 mg, 2 tablet, Oral, Daily PRN  bisacodyl (DULCOLAX) suppository 10 mg, 10 mg, Rectal, Daily PRN  febuxostat (ULORIC) tablet 40 mg, 40 mg, Oral, Daily  tamsulosin (FLOMAX) capsule 0.4 mg, 0.4 mg, Oral, Daily  gabapentin (NEURONTIN) capsule 300 mg, 300 mg, Oral, BID  furosemide (LASIX) tablet 80 mg, 80 mg, Oral, BID  pantoprazole (PROTONIX) tablet 40 mg, 40 mg, Oral, QAM AC  ranolazine (RANEXA) extended release tablet 1,000 mg, 1,000 mg, Oral, BID  insulin lispro (HUMALOG) injection vial 0-12 Units, 0-12 Units, Subcutaneous, TID WC  insulin lispro (HUMALOG) injection vial 0-6 Units, 0-6 Units, Subcutaneous, Nightly  glucose (GLUTOSE) 40 % oral gel 15 g, 15 g, Oral, PRN  dextrose 50 % IV solution, 12.5 g, Intravenous, PRN  glucagon (rDNA) injection 1 mg, 1 mg, Intramuscular, PRN  dextrose 5 % solution, 100 mL/hr, Intravenous, PRN  apixaban (ELIQUIS) tablet 5 mg, 5 mg, Oral, BID  aspirin chewable tablet 81 mg, 81 mg, Oral, Daily  atorvastatin (LIPITOR) tablet 80 mg, 80 mg, Oral, Nightly  busPIRone (BUSPAR) tablet 7.5 mg, 7.5 mg, Oral, TID  carvedilol (COREG) tablet 6.25 mg, 6.25 mg, Oral, BID WC  ferrous sulfate (IRON 325) tablet 325 mg, 325 mg, Oral, BID WC      Objective:  /62   Pulse 64   Temp 97.5 °F (36.4 °C) (Oral)   Resp 16   Ht 5' 5\" (1.651 m)   Wt 232 lb 9.4 oz (105.5 kg)   SpO2 93%   BMI 38.70 kg/m²       GEN: Well developed, well nourished, no acute distress  HEENT: NCAT. EOM grossly intact. Hearing grossly intact. Mucous membranes pink and moist.  RESP: Normal breath sounds with no wheezing, rales, or rhonchi. Respirations WNL and unlabored. CV: Regular rate and rhythm. No murmurs, rubs, or gallops. ABD: Soft, non-distended, BS+ and equal.  NEURO: Alert. Speech fluent. No spasticity noted in the right upper limb. MSK:  Muscle bulk is normal bilaterally. Moving all limbs with at least antigravity strength. LIMBS:  Edema present in bilateral lower limbs. SKIN: Warm and dry with good turgor. PSYCH: Mood WNL. Affect WNL. Appropriately interactive. Diagnostics:     CBC: No results for input(s): WBC, RBC, HGB, HCT, MCV, RDW, PLT in the last 72 hours.   BMP:   Recent Labs     08/20/21  0702      K 4.8      CO2 28   PHOS 5.0*   BUN 44*   CREATININE 3.44*   GLUCOSE 276*     BNP: No results for input(s): BNP in the last 72 hours. PT/INR: No results for input(s): PROTIME, INR in the last 72 hours. APTT: No results for input(s): APTT in the last 72 hours. CARDIAC ENZYMES: No results for input(s): CKMB, CKMBINDEX, TROPONINT in the last 72 hours. Invalid input(s): CKTOTAL;3  FASTING LIPID PANEL:  Lab Results   Component Value Date    CHOL 105 04/09/2015    HDL 43 04/09/2015    TRIG 168 04/09/2015     LIVER PROFILE: No results for input(s): AST, ALT, ALB, BILIDIR, BILITOT, ALKPHOS in the last 72 hours. Impression/Plan:   Impaired ADLs, gait, and mobility due to:    1. Ischemic CVA L frontal lobe with mild R dominant hemiparesis:  PT/OT for gait, mobility, strengthening, endurance, ADLs, and self care. On ASA, atorvastatin  2. Right wrist/hand tremors/spasms:  May be related to fatigue. No spasticity noted on exam.  Will continue to monitor. 3. HTN/CAD/Angina/Diastolic CHF: on carvedilol, furosemide, Ranexa  4. Insomnia: on Trazodone - was taking 75 mg at home - increased to 100 mg on 8/12. Sleep improved. 5. Anxiety: on buspirone  6. DM with neuropathy: on lantus, sliding scale, gabapentin  7. AURELIA on CKD IV: Initiated on HD at [facility] [place] [facility]. Nephrology following - continuing lasix at 80 mg BID. For HD MWF. IT placed tunneled cath 8/18.  8. Anemia of chronic disease: on ferrous sulfate. Had IV iron at [facility]. Hb low but stable. Monitoring routinely  9. Chronic neck and back pain: pain stable. Has prn Tylenol. Ice prn. 10. Urine retention: on flomax  11. Bowel Management: Miralax daily, senokot prn, dulcolax prn.   12. DVT Prophylaxis:  SCD's while in bed, AGUSTIN's and Eliquis  13.  Internal Medicine for medical management      Electronically signed by Bladimir Varma MD on 8/22/2021 at 4:21 PM

## 2021-08-22 NOTE — PROGRESS NOTES
[facility] Internal Medicine    CONSULTATION / HISTORY AND PHYSICAL EXAMINATION            Date:   8/22/2021  Patient name:  Rozina Rogers  Date of admission:  8/11/2021  5:47 PM  MRN:   [id number]  Account:  [de-identified]  YOB: 1960  PCP:    AFSANEH Jennings CNP  Room:   [facility]  Code Status:    Full Code    Physician Requesting Consult: Nannette Orozco MD    Reason for Consult:  medical management    Chief Complaint:     No chief complaint on file. Right upper and lower extremity weakness  History Obtained From:     Patient medical record nursing staff    History of Present Illness:   64-year-old  lady morbidly obese class III BMI 40.74 initially admitted to [facility] with right arm weakness and altered mental status she had an MRI done which showed acute lacunar infarct of the left frontal lobe medically managed history of chronic kidney disease had acute kidney injury required hemodialysis with right-sided Mauricio catheter in place in the jugular vein with improving renal function hemodialysis on hold    Noted to have some epistaxis this morning refusing to have anterior nasal packing done patient on anticoagulants  Multiple comorbid condition including hypertension hyperlipidemia coronary disease congestive heart failure diabetes mellitus    8/14   Patient is doing much better today  No new complaints    8/17   Plan for tunnel catheter  Blood sugars running high.   Past Medical History:     Past Medical History:   Diagnosis Date    Backache, unspecified     CHF (congestive heart failure) (HCC)     Chronic kidney disease     Coronary atherosclerosis of artery bypass graft     Cramp of limb     Gallstones     Hypertension     Insomnia     Pneumonia     Type II or unspecified type diabetes mellitus with renal manifestations, not stated as uncontrolled(250.40)     Type II or unspecified type diabetes mellitus without mention of complication, not stated as uncontrolled     Unspecified vitamin D deficiency         Past Surgical History:     Past Surgical History:   Procedure Laterality Date    ANKLE FRACTURE SURGERY      BREAST SURGERY      CARPAL TUNNEL RELEASE      CHOLECYSTECTOMY, OPEN N/A     CORONARY ARTERY BYPASS GRAFT      x3    HAND SURGERY      IR TUNNELED CATHETER PLACEMENT GREATER THAN 5 YEARS  8/18/2021    IR TUNNELED CATHETER PLACEMENT GREATER THAN 5 YEARS 8/18/2021 [facility]ED[facility]    KNEE ARTHROSCOPY      right    TONSILLECTOMY          Medications Prior to Admission:     Prior to Admission medications    Medication Sig Start Date End Date Taking? Authorizing Provider   busPIRone (BUSPAR) 7.5 MG tablet TAKE 1 TABLET BY MOUTH THREE TIMES DAILY 7/1/21   Historical Provider, MD   gabapentin (NEURONTIN) 300 MG capsule TAKE 1 CAPSULE BY MOUTH TWICE DAILY 6/21/21 7/23/21  AFSANEH Auguste CNP   ELIQUIS 5 MG TABS tablet  6/5/21   Historical Provider, MD   blood glucose test strips (DEN CONTOUR TEST) strip 1 each by In Vitro route 3 times daily As needed. 4/30/21   AFSANEH Auguste CNP   Dulaglutide (TRULICITY) 1.5 HJ/1.0AT SOPN 1.5 mg    Historical Provider, MD   insulin glargine (BASAGLAR KWIKPEN) 100 UNIT/ML injection pen Basaglar KwikPen U-100 Insulin 100 unit/mL (3 mL) subcutaneous   Inject 43 units twice a day by subcutaneous route as directed for 90 days.     Historical Provider, MD   insulin lispro, 1 Unit Dial, (HUMALOG KWIKPEN) 100 UNIT/ML SOPN Humalog KwikPen (U-100) Insulin 100 unit/mL subcutaneous   15 units with each meal plus 2-10 units as SS if BS>150    Historical Provider, MD   Biotin w/ Vitamins C & E (HAIR/SKIN/NAILS PO) Take 200 mg by mouth daily    Historical Provider, MD   allopurinol (ZYLOPRIM) 100 MG tablet Take 100 mg by mouth daily    Historical Provider, MD   sharps container 1 each by Does not apply route as needed (dirty pen needles) 4/19/21   AFSANEH Auguste CNP ferrous sulfate (BRIAN-ARCHIE) 325 (65 Fe) MG tablet Take 1 tablet by mouth daily 4/19/21   AFSANEH Fisher CNP   allopurinol (ZYLOPRIM) 100 MG tablet Take 1 tablet by mouth daily 4/14/21   AFSANEH Fisher CNP   isosorbide mononitrate (IMDUR) 30 MG extended release tablet Take 1 tablet by mouth daily 4/9/21   Thao Louie MD   traZODone (DESYREL) 50 MG tablet Take 75 mg by mouth nightly    Historical Provider, MD   bumetanide (BUMEX) 2 MG tablet Take 1 tablet by mouth daily 3/30/21   AFSANEH Fisher CNP   atorvastatin (LIPITOR) 40 MG tablet Take 1 tablet by mouth daily 3/30/21   AFSANEH Fisher CNP   carvedilol (COREG) 6.25 MG tablet Take 1 tablet by mouth 2 times daily 3/30/21   AFSANEH Fisher CNP   ranolazine (RANEXA) 500 MG extended release tablet Take 1 tablet by mouth 2 times daily  Patient taking differently: Take 1,000 mg by mouth 2 times daily  3/30/21   AFSANEH Fisher CNP   nitroGLYCERIN (NITROSTAT) 0.4 MG SL tablet Place 1 tablet under the tongue every 5 minutes as needed for Chest pain. 3/29/15   AFSANEH Dillon CNP   Insulin Pen Needle (BD ULTRA-FINE PEN NEEDLES) 29G X 12.7MM MISC 1 each by Does not apply route 6 times daily. For use with each insulin 12/23/14   Yomaira Jarvis MD   docusate sodium (COLACE) 100 MG capsule Take 100 mg by mouth 2 times daily. Historical Provider, MD   Lancets 28G MISC Inject 1 each into the skin 3 times daily. 10/30/14   Yomaira Jarvis MD        Allergies:     Adhesive tape, Ace inhibitors, Iv dye [iodides], and Metformin and related    Social History:     Tobacco:    reports that she has never smoked. She has never used smokeless tobacco.  Alcohol:      reports no history of alcohol use. Drug Use:  reports no history of drug use.     Family History:     Family History   Problem Relation Age of Onset    Diabetes Father     Heart Failure Father        Review of Systems:     Positive and Negative as described in HPI.    CONSTITUTIONAL:  negative for fevers, chills, sweats, fatigue, weight loss  HEENT:  negative for vision, hearing changes, runny nose, throat pain    RESPIRATORY:  negative for shortness of breath, cough, congestion, wheezing. CARDIOVASCULAR:  negative for chest pain, palpitations. GASTROINTESTINAL:  negative for nausea, vomiting, diarrhea, constipation, change in bowel habits, abdominal pain   GENITOURINARY:  negative for difficulty of urination, burning with urination, frequency   INTEGUMENT:  negative for rash, skin lesions, easy bruising   HEMATOLOGIC/LYMPHATIC:  negative for swelling/edema   ALLERGIC/IMMUNOLOGIC:  negative for urticaria , itching  ENDOCRINE:  negative increase in drinking, increase in urination, hot or cold intolerance  MUSCULOSKELETAL:  negative joint pains, muscle aches, swelling of joints  NEUROLOGICAL: Positive for right upper extremity and right lower extremity mild weakness  BEHAVIOR/PSYCH: Denies depression    Physical Exam:     BP (!) 129/57   Pulse 67   Temp 97.5 °F (36.4 °C) (Oral)   Resp 16   Ht 5' 5\" (1.651 m)   Wt 232 lb 9.4 oz (105.5 kg)   SpO2 93%   BMI 38.70 kg/m²   Temp (24hrs), Av.4 °F (36.3 °C), Min:97.3 °F (36.3 °C), Max:97.5 °F (36.4 °C)    Recent Labs     21  0819 21  1137 21  1557   POCGLU 220* 145* 252* 224*     No intake or output data in the 24 hours ending 21 1606  Dialysis catheter noted in the jugular vein right side of the neck  General Appearance:  alert, well appearing, and in no acute distress  Mental status: oriented to person, place, and time with normal affect  Head:  normocephalic, atraumatic.   Eye: no icterus, redness, pupils equal and reactive, extraocular eye movements intact, conjunctiva clear  Ear: normal external ear, no discharge, hearing intact  Nose:  no drainage noted  Small amount of epistaxis noted    Mouth: mucous membranes moist  Neck: supple, no carotid bruits, thyroid not palpable  Lungs: Bilateral equal air entry, clear to ausculation, no wheezing, rales or rhonchi, normal effort  Cardiovascular: normal rate, regular rhythm, no murmur, gallop, rub. Abdomen: Soft, nontender, nondistended, normal bowel sounds, no hepatomegaly or splenomegaly  Neurologic: There are no new focal motor or sensory deficits, normal muscle tone and bulk, no abnormal sensation, normal speech, cranial nerves II through XII grossly intact  Positive for right upper and right lower extremity 4 out of 5 strength strength  Skin: No gross lesions, rashes, bruising or bleeding on exposed skin area  Extremities:  peripheral pulses palpable, no pedal edema or calf pain with palpation  Psych:      Investigations:      Laboratory Testing:  Recent Results (from the past 24 hour(s))   POC Glucose Fingerstick    Collection Time: 08/21/21  4:07 PM   Result Value Ref Range    POC Glucose 207 (H) 65 - 105 mg/dL   POC Glucose Fingerstick    Collection Time: 08/21/21  8:03 PM   Result Value Ref Range    POC Glucose 220 (H) 65 - 105 mg/dL   POC Glucose Fingerstick    Collection Time: 08/22/21  8:19 AM   Result Value Ref Range    POC Glucose 145 (H) 65 - 105 mg/dL   POC Glucose Fingerstick    Collection Time: 08/22/21 11:37 AM   Result Value Ref Range    POC Glucose 252 (H) 65 - 105 mg/dL   POC Glucose Fingerstick    Collection Time: 08/22/21  3:57 PM   Result Value Ref Range    POC Glucose 224 (H) 65 - 105 mg/dL           Consultations:   IP CONSULT TO DIETITIAN  IP CONSULT TO SOCIAL WORK  IP CONSULT TO INTERNAL MEDICINE  IP CONSULT TO NEPHROLOGY  Assessment :      Primary Problem  <principal problem not specified>    Active Hospital Problems    Diagnosis Date Noted    Acute cerebrovascular accident (CVA) ([facility] [address] [I63.9] 08/11/2021    Chronic ischemic heart disease [I25.9] 07/23/2021    Anemia in stage 4 chronic kidney disease ([facility] [address] [N18.4, D63.1] 05/03/2021    Essential hypertension [I10] 05/03/2021    History of coronary artery bypass graft [Z95.1] 03/31/2021    Di[facility] with type [facility] [E11.42] 02/17/2020    Chronic diastolic heart failure ([facility] [I50.32] 09/20/2018    Mixed hyperlipidemia [E78.2] 07/27/2016    Coronary artery disease [I25.10] 05/04/2015   Active Problems:    Coronary artery disease    Chronic diastolic heart failure (HCC)    Diabetic polyneuropathy associated with type 2 diabetes mellitus ([facility] [address]    History of coronary artery bypass graft    Mixed hyperlipidemia    Essential hypertension    Anemia in stage 4 chronic kidney disease ([facility] [address]    Chronic ischemic heart disease    Acute cerebrovascular accident (CVA) ([facility]  Resolved Problems:    * No resolved hospital problems. *        Plan:     78-year-old lady morbidly obese class III BMI 40.74 history of chronic kidney disease hypertension coronary disease chronic diastolic congestive heart failure  New diagnosis of acute lacunar infarct with right upper and lower extremity weakness  Acute on chronic kidney disease required hemodialysis with a Mauricio catheter  Diabetes mellitus with neuropathy Lantus sliding scale, some readings of high blood sugars, increasing Lantus to 53 units twice a day, monitoring closely  Gabapentin continue for diabetic neuropathy  Anemia of chronic kidney disease received IV iron  Coronary artery disease continue carvedilol and Ranexa  Chronic diastolic congestive heart failure compensated continue Lasix  Plan for hd per nephrologist        Gerhardt Lovings, MD  8/22/2021  4:06 PM    Copy sent to Dr. Sam Dockery, APRN - CNP    Please note that this chart was generated using voice recognition Dragon dictation software. Although every effort was made to ensure the accuracy of this automated transcription, some errors in transcription may have occurred.

## 2021-08-22 NOTE — PLAN OF CARE
Problem: Falls - Risk of:  Goal: Will remain free from falls  Description: Will remain free from falls  8/22/2021 1524 by Brian Love RN  Outcome: Ongoing     Problem: Falls - Risk of:  Goal: Absence of physical injury  Description: Absence of physical injury  Outcome: Ongoing     Problem: Skin Integrity:  Goal: Will show no infection signs and symptoms  Description: Will show no infection signs and symptoms  Outcome: Ongoing     Problem: Skin Integrity:  Goal: Absence of new skin breakdown  Description: Absence of new skin breakdown  Outcome: Ongoing     Problem: Pain:  Goal: Control of acute pain  Description: Control of acute pain  8/22/2021 1524 by Brian Love RN  Outcome: Ongoing     Problem: Pain:  Goal: Control of chronic pain  Description: Control of chronic pain  Outcome: Ongoing     Problem: Musculor/Skeletal Functional Status  Goal: Highest potential functional level  Outcome: Ongoing     Problem: Musculor/Skeletal Functional Status  Goal: Absence of falls  Outcome: Ongoing     Problem: Nutrition  Goal: Optimal nutrition therapy  Outcome: Ongoing     Problem: Activity:  Goal: Fatigue will decrease  Description: Fatigue will decrease  Outcome: Ongoing     Problem:  Activity:  Goal: Risk for activity intolerance will decrease  Description: Risk for activity intolerance will decrease  Outcome: Ongoing     Problem: Fluid Volume:  Goal: Will show no signs or symptoms of fluid imbalance  Description: Will show no signs or symptoms of fluid imbalance  Outcome: Ongoing

## 2021-08-22 NOTE — PROGRESS NOTES
[place] [address] [facility]    CONSULTATION / HISTORY AND PHYSICAL EXAMINATION            Date:   8/22/2021  Patient name:  Alecia Pagan  Date of admission:  8/11/2021  5:47 PM  MRN:   [id number]  Account:  [de-identified]  YOB: 1960  PCP:    AFSANEH Ordonez CNP  Room:   [address] [place] [address]  Code Status:    Full Code    Physician Requesting Consult: Holden Julio MD    Reason for Consult:  medical management    Chief Complaint:     No chief complaint on file. Right upper and lower extremity weakness  History Obtained From:     Patient medical record nursing staff    History of Present Illness:   64-year-old  lady morbidly obese class III BMI 40.74 initially admitted to [facility] with right arm weakness and altered mental status she had an MRI done which showed acute lacunar infarct of the left frontal lobe medically managed history of chronic kidney disease had acute kidney injury required hemodialysis with right-sided Mauricio catheter in place in the jugular vein with improving renal function hemodialysis on hold    Noted to have some epistaxis this morning refusing to have anterior nasal packing done patient on anticoagulants  Multiple comorbid condition including hypertension hyperlipidemia coronary disease congestive heart failure diabetes mellitus    8/14   Patient is doing much better today  No new complaints    8/17   Plan for tunnel catheter  Blood sugars running high.   Past Medical History:     Past Medical History:   Diagnosis Date    Backache, unspecified     CHF (congestive heart failure) (HCC)     Chronic kidney disease     Coronary atherosclerosis of artery bypass graft     Cramp of limb     Gallstones     Hypertension     Insomnia     Pneumonia     Type II or unspecified type diabetes mellitus with renal manifestations, not stated as uncontrolled(250.40)     Type II or unspecified type diabetes mellitus without mention of complication, not stated as uncontrolled     Unspecified vitamin D deficiency         Past Surgical History:     Past Surgical History:   Procedure Laterality Date    ANKLE FRACTURE SURGERY      BREAST SURGERY      CARPAL TUNNEL RELEASE      CHOLECYSTECTOMY, OPEN N/A     CORONARY ARTERY BYPASS GRAFT      x3    HAND SURGERY      IR TUNNELED CATHETER PLACEMENT GREATER THAN 5 YEARS  8/18/2021    IR TUNNELED CATHETER PLACEMENT GREATER THAN 5 YEARS 8/18/2021 [facility]ED[facility]    KNEE ARTHROSCOPY      right    TONSILLECTOMY          Medications Prior to Admission:     Prior to Admission medications    Medication Sig Start Date End Date Taking? Authorizing Provider   busPIRone (BUSPAR) 7.5 MG tablet TAKE 1 TABLET BY MOUTH THREE TIMES DAILY 7/1/21   Historical Provider, MD   gabapentin (NEURONTIN) 300 MG capsule TAKE 1 CAPSULE BY MOUTH TWICE DAILY 6/21/21 7/23/21  AFSANEH Hawkins CNP   ELIQUIS 5 MG TABS tablet  6/5/21   Historical Provider, MD   blood glucose test strips (DEN CONTOUR TEST) strip 1 each by In Vitro route 3 times daily As needed. 4/30/21   AFSANEH Hawkins CNP   Dulaglutide (TRULICITY) 1.5 KE/3.7SE SOPN 1.5 mg    Historical Provider, MD   insulin glargine (BASAGLAR KWIKPEN) 100 UNIT/ML injection pen Basaglar KwikPen U-100 Insulin 100 unit/mL (3 mL) subcutaneous   Inject 43 units twice a day by subcutaneous route as directed for 90 days.     Historical Provider, MD   insulin lispro, 1 Unit Dial, (HUMALOG KWIKPEN) 100 UNIT/ML SOPN Humalog KwikPen (U-100) Insulin 100 unit/mL subcutaneous   15 units with each meal plus 2-10 units as SS if BS>150    Historical Provider, MD   Biotin w/ Vitamins C & E (HAIR/SKIN/NAILS PO) Take 200 mg by mouth daily    Historical Provider, MD   allopurinol (ZYLOPRIM) 100 MG tablet Take 100 mg by mouth daily    Historical Provider, MD   sharps container 1 each by Does not apply route as needed (dirty pen needles) 4/19/21   AFSANEH Hawkins CNP ferrous sulfate (BRIAN-ARCHIE) 325 (65 Fe) MG tablet Take 1 tablet by mouth daily 4/19/21   Ethelda Clock, APRN - CNP   allopurinol (ZYLOPRIM) 100 MG tablet Take 1 tablet by mouth daily 4/14/21   Ethelda Clock, APRN - CNP   isosorbide mononitrate (IMDUR) 30 MG extended release tablet Take 1 tablet by mouth daily 4/9/21   Ashley Camarena MD   traZODone (DESYREL) 50 MG tablet Take 75 mg by mouth nightly    Historical Provider, MD   bumetanide (BUMEX) 2 MG tablet Take 1 tablet by mouth daily 3/30/21   Ethelda Clock, APRN - CNP   atorvastatin (LIPITOR) 40 MG tablet Take 1 tablet by mouth daily 3/30/21   Ethelda Clock, APRN - CNP   carvedilol (COREG) 6.25 MG tablet Take 1 tablet by mouth 2 times daily 3/30/21   Ethelda Clock, APRN - CNP   ranolazine (RANEXA) 500 MG extended release tablet Take 1 tablet by mouth 2 times daily  Patient taking differently: Take 1,000 mg by mouth 2 times daily  3/30/21   Ethelda Clock, APRN - CNP   nitroGLYCERIN (NITROSTAT) 0.4 MG SL tablet Place 1 tablet under the tongue every 5 minutes as needed for Chest pain. 3/29/15   Brett Ariza, APRN - CNP   Insulin Pen Needle (BD ULTRA-FINE PEN NEEDLES) 29G X 12.7MM MISC 1 each by Does not apply route 6 times daily. For use with each insulin 12/23/14   Mack Mancera MD   docusate sodium (COLACE) 100 MG capsule Take 100 mg by mouth 2 times daily. Historical Provider, MD   Lancets 28G MISC Inject 1 each into the skin 3 times daily. 10/30/14   Mack Mancera MD        Allergies:     Adhesive tape, Ace inhibitors, Iv dye [iodides], and Metformin and related    Social History:     Tobacco:    reports that she has never smoked. She has never used smokeless tobacco.  Alcohol:      reports no history of alcohol use. Drug Use:  reports no history of drug use.     Family History:     Family History   Problem Relation Age of Onset    Diabetes Father     Heart Failure Father        Review of Systems:     Positive and Negative as described in HPI.    CONSTITUTIONAL:  negative for fevers, chills, sweats, fatigue, weight loss  HEENT:  negative for vision, hearing changes, runny nose, throat pain    RESPIRATORY:  negative for shortness of breath, cough, congestion, wheezing. CARDIOVASCULAR:  negative for chest pain, palpitations. GASTROINTESTINAL:  negative for nausea, vomiting, diarrhea, constipation, change in bowel habits, abdominal pain   GENITOURINARY:  negative for difficulty of urination, burning with urination, frequency   INTEGUMENT:  negative for rash, skin lesions, easy bruising   HEMATOLOGIC/LYMPHATIC:  negative for swelling/edema   ALLERGIC/IMMUNOLOGIC:  negative for urticaria , itching  ENDOCRINE:  negative increase in drinking, increase in urination, hot or cold intolerance  MUSCULOSKELETAL:  negative joint pains, muscle aches, swelling of joints  NEUROLOGICAL: Positive for right upper extremity and right lower extremity mild weakness  BEHAVIOR/PSYCH: Denies depression    Physical Exam:     BP (!) 129/57   Pulse 67   Temp 97.5 °F (36.4 °C) (Oral)   Resp 16   Ht 5' 5\" (1.651 m)   Wt 232 lb 9.4 oz (105.5 kg)   SpO2 93%   BMI 38.70 kg/m²   Temp (24hrs), Av.4 °F (36.3 °C), Min:97.3 °F (36.3 °C), Max:97.5 °F (36.4 °C)    Recent Labs     21  0819 21  1137 21  1557   POCGLU 220* 145* 252* 224*     No intake or output data in the 24 hours ending 21 1606  Dialysis catheter noted in the jugular vein right side of the neck  General Appearance:  alert, well appearing, and in no acute distress  Mental status: oriented to person, place, and time with normal affect  Head:  normocephalic, atraumatic.   Eye: no icterus, redness, pupils equal and reactive, extraocular eye movements intact, conjunctiva clear  Ear: normal external ear, no discharge, hearing intact  Nose:  no drainage noted  Small amount of epistaxis noted    Mouth: mucous membranes moist  Neck: supple, no carotid bruits, thyroid not palpable  Lungs: Bilateral equal air entry, clear to ausculation, no wheezing, rales or rhonchi, normal effort  Cardiovascular: normal rate, regular rhythm, no murmur, gallop, rub. Abdomen: Soft, nontender, nondistended, normal bowel sounds, no hepatomegaly or splenomegaly  Neurologic: There are no new focal motor or sensory deficits, normal muscle tone and bulk, no abnormal sensation, normal speech, cranial nerves II through XII grossly intact  Positive for right upper and right lower extremity 4 out of 5 strength strength  Skin: No gross lesions, rashes, bruising or bleeding on exposed skin area  Extremities:  peripheral pulses palpable, no pedal edema or calf pain with palpation  Psych:      Investigations:      Laboratory Testing:  Recent Results (from the past 24 hour(s))   POC Glucose Fingerstick    Collection Time: 08/21/21  4:07 PM   Result Value Ref Range    POC Glucose 207 (H) 65 - 105 mg/dL   POC Glucose Fingerstick    Collection Time: 08/21/21  8:03 PM   Result Value Ref Range    POC Glucose 220 (H) 65 - 105 mg/dL   POC Glucose Fingerstick    Collection Time: 08/22/21  8:19 AM   Result Value Ref Range    POC Glucose 145 (H) 65 - 105 mg/dL   POC Glucose Fingerstick    Collection Time: 08/22/21 11:37 AM   Result Value Ref Range    POC Glucose 252 (H) 65 - 105 mg/dL   POC Glucose Fingerstick    Collection Time: 08/22/21  3:57 PM   Result Value Ref Range    POC Glucose 224 (H) 65 - 105 mg/dL           Consultations:   IP CONSULT TO DIETITIAN  IP CONSULT TO SOCIAL WORK  IP CONSULT TO INTERNAL MEDICINE  IP CONSULT TO NEPHROLOGY  Assessment :      Primary Problem  <principal problem not specified>    Active Hospital Problems    Diagnosis Date Noted    Acute cerebrovascular accident (CVA) ([facility] [I63.9] 08/11/2021    Chronic ischemic heart disease [I25.9] 07/23/2021    Anemia in stage 4 chronic kidney disease ([facility] [N18.4, D63.1] 05/03/2021    Essential hypertension [I10] 05/03/2021    History of coronary artery bypass graft [Z95.1] 03/31/2021    Diabetic polyneuropathy associated with type 2 diabetes mellitus ([facility] [E11.42] 02/17/2020    Chronic diastolic heart failure ([facility] [I50.32] 09/20/2018    Mixed hyperlipidemia [E78.2] 07/27/2016    Coronary artery disease [I25.10] 05/04/2015   Active Problems:    Coronary artery disease    Chronic diastolic heart failure (HCC)    Diabetic polyneuropathy associated with type 2 diabetes mellitus ([facility]    History of coronary artery bypass graft    Mixed hyperlipidemia    Essential hypertension    Anemia in stage 4 chronic kidney disease ([facility]    Chronic ischemic heart disease    Acute cerebrovascular accident (CVA) ([facility]  Resolved Problems:    * No resolved hospital problems. *        Plan:     57-year-old lady morbidly obese class III BMI 40.74 history of chronic kidney disease hypertension coronary disease chronic diastolic congestive heart failure  New diagnosis of acute lacunar infarct with right upper and lower extremity weakness  Acute on chronic kidney disease required hemodialysis with a Mauricio catheter  Diabetes mellitus with neuropathy Lantus sliding scale, some readings of high blood sugars, increasing Lantus to 53 units twice a day, monitoring closely  Gabapentin continue for diabetic neuropathy  Anemia of chronic kidney disease received IV iron  Coronary artery disease continue carvedilol and Ranexa  Chronic diastolic congestive heart failure compensated continue Lasix  Plan for hd per nephrologist        Jacinda Kimbrough MD  8/22/2021  4:06 PM    Copy sent to Dr. Danell Dakins, APRN - CNP    Please note that this chart was generated using voice recognition Dragon dictation software. Although every effort was made to ensure the accuracy of this automated transcription, some errors in transcription may have occurred.

## 2021-08-22 NOTE — PLAN OF CARE
Problem: Falls - Risk of:  Goal: Will remain free from falls  Description: Will remain free from falls  Outcome: Ongoing  Note: No falls or injuries sustained at this time. No attempts to get out of bed without nursing assistance. Call light within reach and pt. uses appropriately for assistance. Siderails up x 2. Nonskid footwear remains on. Bed in low and locked position. Hourly nursing rounds made. Pt. Alert and oriented, aware of limitations, and exhibits good safety judgement. Pt. uses assistive devices appropriately. Pt. understands individual fall risk factors. Pt. reminded to use call light with each nurse/patient interaction. Pt. room located close to nurse's station. Bed alarm / Personal alarm remains engaged throughout the shift as a precaution. Problem: Pain:  Goal: Control of acute pain  Description: Control of acute pain  Note: Pain assessment completed. Pt. able to rest.  Pt. denies pain this shift. Pt. denies need for oral analgesic. Verbalizes understanding of nonpharmacologic strategies that provide comfort. Pt. Repositions self for comfort. Nonverbal cues indicate absence of pain.

## 2021-08-23 LAB
ANION GAP SERPL CALCULATED.3IONS-SCNC: 10 MMOL/L (ref 9–17)
BUN BLDV-MCNC: 41 MG/DL (ref 8–23)
BUN/CREAT BLD: ABNORMAL (ref 9–20)
CALCIUM SERPL-MCNC: 9.4 MG/DL (ref 8.6–10.4)
CHLORIDE BLD-SCNC: 100 MMOL/L (ref 98–107)
CO2: 29 MMOL/L (ref 20–31)
CREAT SERPL-MCNC: 3.2 MG/DL (ref 0.5–0.9)
GFR AFRICAN AMERICAN: 18 ML/MIN
GFR NON-AFRICAN AMERICAN: 15 ML/MIN
GFR SERPL CREATININE-BSD FRML MDRD: ABNORMAL ML/MIN/{1.73_M2}
GFR SERPL CREATININE-BSD FRML MDRD: ABNORMAL ML/MIN/{1.73_M2}
GLUCOSE BLD-MCNC: 152 MG/DL (ref 70–99)
GLUCOSE BLD-MCNC: 156 MG/DL (ref 65–105)
GLUCOSE BLD-MCNC: 205 MG/DL (ref 65–105)
POTASSIUM SERPL-SCNC: 4.2 MMOL/L (ref 3.7–5.3)
SODIUM BLD-SCNC: 139 MMOL/L (ref 135–144)

## 2021-08-23 PROCEDURE — 97116 GAIT TRAINING THERAPY: CPT

## 2021-08-23 PROCEDURE — 99232 SBSQ HOSP IP/OBS MODERATE 35: CPT | Performed by: INTERNAL MEDICINE

## 2021-08-23 PROCEDURE — 6370000000 HC RX 637 (ALT 250 FOR IP): Performed by: INTERNAL MEDICINE

## 2021-08-23 PROCEDURE — 82947 ASSAY GLUCOSE BLOOD QUANT: CPT

## 2021-08-23 PROCEDURE — 97535 SELF CARE MNGMENT TRAINING: CPT

## 2021-08-23 PROCEDURE — 90935 HEMODIALYSIS ONE EVALUATION: CPT

## 2021-08-23 PROCEDURE — 97530 THERAPEUTIC ACTIVITIES: CPT

## 2021-08-23 PROCEDURE — 36415 COLL VENOUS BLD VENIPUNCTURE: CPT

## 2021-08-23 PROCEDURE — 6370000000 HC RX 637 (ALT 250 FOR IP): Performed by: PHYSICAL MEDICINE & REHABILITATION

## 2021-08-23 PROCEDURE — 97110 THERAPEUTIC EXERCISES: CPT

## 2021-08-23 PROCEDURE — 99232 SBSQ HOSP IP/OBS MODERATE 35: CPT | Performed by: STUDENT IN AN ORGANIZED HEALTH CARE EDUCATION/TRAINING PROGRAM

## 2021-08-23 PROCEDURE — 1180000000 HC REHAB R&B

## 2021-08-23 PROCEDURE — 97130 THER IVNTJ EA ADDL 15 MIN: CPT

## 2021-08-23 PROCEDURE — 97129 THER IVNTJ 1ST 15 MIN: CPT

## 2021-08-23 PROCEDURE — 80048 BASIC METABOLIC PNL TOTAL CA: CPT

## 2021-08-23 RX ADMIN — FEBUXOSTAT 40 MG: 40 TABLET, FILM COATED ORAL at 08:40

## 2021-08-23 RX ADMIN — INSULIN LISPRO 2 UNITS: 100 INJECTION, SOLUTION INTRAVENOUS; SUBCUTANEOUS at 08:49

## 2021-08-23 RX ADMIN — RANOLAZINE 1000 MG: 1000 TABLET, FILM COATED, EXTENDED RELEASE ORAL at 20:12

## 2021-08-23 RX ADMIN — INSULIN LISPRO 2 UNITS: 100 INJECTION, SOLUTION INTRAVENOUS; SUBCUTANEOUS at 20:14

## 2021-08-23 RX ADMIN — STANDARDIZED SENNA CONCENTRATE 17.2 MG: 8.6 TABLET ORAL at 08:49

## 2021-08-23 RX ADMIN — ACETAMINOPHEN 650 MG: 325 TABLET, FILM COATED ORAL at 20:11

## 2021-08-23 RX ADMIN — PANTOPRAZOLE SODIUM 40 MG: 40 TABLET, DELAYED RELEASE ORAL at 06:15

## 2021-08-23 RX ADMIN — INSULIN GLARGINE 53 UNITS: 100 INJECTION, SOLUTION SUBCUTANEOUS at 08:50

## 2021-08-23 RX ADMIN — FUROSEMIDE 80 MG: 40 TABLET ORAL at 08:38

## 2021-08-23 RX ADMIN — FERROUS SULFATE TAB 325 MG (65 MG ELEMENTAL FE) 325 MG: 325 (65 FE) TAB at 08:38

## 2021-08-23 RX ADMIN — APIXABAN 5 MG: 5 TABLET, FILM COATED ORAL at 20:12

## 2021-08-23 RX ADMIN — BUSPIRONE HYDROCHLORIDE 7.5 MG: 7.5 TABLET ORAL at 14:12

## 2021-08-23 RX ADMIN — INSULIN GLARGINE 53 UNITS: 100 INJECTION, SOLUTION SUBCUTANEOUS at 20:14

## 2021-08-23 RX ADMIN — APIXABAN 5 MG: 5 TABLET, FILM COATED ORAL at 08:38

## 2021-08-23 RX ADMIN — BUSPIRONE HYDROCHLORIDE 7.5 MG: 7.5 TABLET ORAL at 20:12

## 2021-08-23 RX ADMIN — ATORVASTATIN CALCIUM 80 MG: 80 TABLET, FILM COATED ORAL at 20:11

## 2021-08-23 RX ADMIN — POLYETHYLENE GLYCOL 3350 17 G: 17 POWDER, FOR SOLUTION ORAL at 08:39

## 2021-08-23 RX ADMIN — ASPIRIN 81 MG: 81 TABLET, CHEWABLE ORAL at 08:38

## 2021-08-23 RX ADMIN — RANOLAZINE 1000 MG: 1000 TABLET, FILM COATED, EXTENDED RELEASE ORAL at 08:40

## 2021-08-23 RX ADMIN — TRAZODONE HYDROCHLORIDE 100 MG: 100 TABLET ORAL at 20:11

## 2021-08-23 RX ADMIN — CARVEDILOL 6.25 MG: 6.25 TABLET, FILM COATED ORAL at 08:38

## 2021-08-23 RX ADMIN — TAMSULOSIN HYDROCHLORIDE 0.4 MG: 0.4 CAPSULE ORAL at 08:38

## 2021-08-23 RX ADMIN — BUSPIRONE HYDROCHLORIDE 7.5 MG: 7.5 TABLET ORAL at 08:39

## 2021-08-23 RX ADMIN — GABAPENTIN 300 MG: 300 CAPSULE ORAL at 20:12

## 2021-08-23 RX ADMIN — GABAPENTIN 300 MG: 300 CAPSULE ORAL at 08:38

## 2021-08-23 ASSESSMENT — PAIN DESCRIPTION - ORIENTATION
ORIENTATION: RIGHT;LEFT
ORIENTATION: LOWER

## 2021-08-23 ASSESSMENT — PAIN DESCRIPTION - LOCATION
LOCATION: FOOT
LOCATION: BACK

## 2021-08-23 ASSESSMENT — PAIN DESCRIPTION - PAIN TYPE
TYPE: ACUTE PAIN
TYPE: CHRONIC PAIN

## 2021-08-23 ASSESSMENT — PAIN DESCRIPTION - ONSET: ONSET: ON-GOING

## 2021-08-23 ASSESSMENT — PAIN DESCRIPTION - FREQUENCY: FREQUENCY: INTERMITTENT

## 2021-08-23 ASSESSMENT — PAIN SCALES - GENERAL
PAINLEVEL_OUTOF10: 0
PAINLEVEL_OUTOF10: 6
PAINLEVEL_OUTOF10: 8

## 2021-08-23 ASSESSMENT — PAIN DESCRIPTION - DESCRIPTORS: DESCRIPTORS: ACHING;DISCOMFORT

## 2021-08-23 NOTE — PROGRESS NOTES
Speech Language Pathology  Speech Language Pathology  [facility] Acute Rehab Unit at [address] [facility]    Cognitive Treatment Note    Date: 8/23/2021  Patients Name: Nathalia Estrada  MRN: [id number]  Diagnosis:   Patient Active Problem List   Diagnosis Code    Coronary artery disease I25.10    Chronic diastolic heart failure (HCC) I50.32    Diabetic polyneuropathy associated with type 2 diabetes mellitus (HCC) E11.42    History of coronary artery bypass graft Z95.1    Iron deficiency anemia D50.9    Spinal stenosis of lumbar region with neurogenic claudication M48.062    Mixed hyperlipidemia E78.2    Stage 4 chronic kidney disease ([facility] N18.4    Type 2 diabetes mellitus with kidney complication, with long-term current use of insulin ([facility] E11.29, Z79.4    Syncope and collapse R55    Obesity, Class II, BMI 35-39.9 E66.9    Thyroid nodule greater than or equal to 1 cm in diameter incidentally noted on imaging study E04.1    Essential hypertension I10    Anemia in stage 4 chronic kidney disease (HCC) N18.4, D63.1    Chronic ischemic heart disease I25.9    Acute cerebrovascular accident (CVA) ([facility] I63.9       Pain: 5/10    Cognitive Treatment    Treatment time: [id number]    Subjective: [x] Alert [x] Cooperative     [] Confused     [] Agitated    [] Lethargic    Objective/Assessment:  Attention: Functional throughout    Recall: n/a     Problem Solving/Reasoning:  Pt. Minerva completed 5 step ADL written sequencing. Pt. Completed written directions c 85% accuracy, 100% c cues. Other:  Pt. Sister entered room during session, pt. Misty Tolbert. Sister re: ST exercises. Plan:  [x] Continue ST services    [] Discharge from ST:      Discharge recommendations: [] Inpatient Rehab   [] [place]   [] Outpatient Therapy  [] Follow up at trauma clinic   [] Other:       Treatment completed by: Hermes Vega A.CCC/SLP

## 2021-08-23 NOTE — PROGRESS NOTES
[address] [facility]    ACUTE REHABILITATION OCCUPATIONAL THERAPY  DAILY NOTE    Date: 21  Patient Name: Lenny Barrera      Room: 2940/6070-43    MRN: [id number]   : 1960  (59 y.o.)  Gender: female   Referring Practitioner: Carlos Sage MD  Diagnosis: CVA  Additional Pertinent Hx: Presented with acute right arm weakness and mental status change. MRI brain showed probable acute to subacute lacunar infarct of the left frontal lobe. AURELIA on CKD stage 4 likely d/t contrast induced nephropathy. Dialysis was initiated on 21, had 2 treatment session, labs improving. Pt. has history of diabetes mellitus type II, CAD s/p CABG (), cervical stenosis, back pain, lymphedema, diastolic CHF, DVT, urinary retention, and iron deficiency anemia. Pt. admitted to ARU from [place] [address] 21. Restrictions  Restrictions/Precautions: Fall Risk  Required Braces or Orthoses  Right Lower Extremity Brace:  (Lymphedema brace )  Left Lower Extremity Brace:  (Lymphedema brace )  Required Braces or Orthoses?: Yes (B lymphedema wraps. )    Subjective  Comments: Pt is pleasant and cooperative. Patient Currently in Pain: Yes  Pain Location: Back  Pain Orientation: Lower  Restrictions/Precautions: Fall Risk  Overall Orientation Status: Within Functional Limits     Pain Assessment  Pain Assessment: 0-10  Pain Level: 4  Pain Type: Chronic pain  Pain Location: Back  Pain Orientation: Lower    Objective  Cognition  Sequencing and Organization: Assistance required to identify errors made (with ease of set up for self care tasks)  Perception  Overall Perceptual Status: WFL  Balance  Sitting Balance: Modified independent   Standing Balance: Stand by assistance (supervision to stand by )  Transfers  Sit to stand: Supervision  Stand to sit: Supervision  Standing Balance  Time: AM: ~2 min, ~1 min x2, <1 min   Activity: AM: functional transfers, self care tasks.    Functional Mobility  Functional - Mobility Device: Rolling Walker  Activity: To/from bathroom  Assist Level: Stand by assistance (supervision/stand by )  Toilet Transfers  Toilet - Technique: Ambulating  Equipment Used: Raised toilet seat with rails (side rail on R side. )  Toilet Transfer: Supervision  Shower Transfers  Shower - Transfer From: Diamond Barahona - Transfer Type: To and From  Shower - Transfer To: Transfer tub bench  Shower - Technique: Ambulating  Shower Transfers: Stand by assistance     Type of ROM/Therapeutic Exercise  Type of ROM/Therapeutic Exercise: Free weights (L: 3#, RL 2-3# (2# involving shoulder))  Comment: Ex's with continuting improved tolerance from previous date, weight tolerated on R side this date although lighter than L side due to some continued soreness from port placement. Pt motivated to complete ex's to tolerance for increased strength and overall conditioning for functional task completion. Exercises  Scapular Protraction: x  Scapular Retraction: x  Shoulder Flexion: x  Shoulder Extension: x  Horizontal ABduction: x  Horizontal ADduction: x  Elbow Flexion: x  Elbow Extension: x  Wrist Flexion: x  Wrist Extension: x  Other: green theraflex bar forward/upward bends, twisting x20  (increased resistance and reps this date. )     Additional Activities: Other  Additional Activities: nuts/bolts threading/unthreading activity utilizing B hands to promote increased coordination skills. Pt manages well; completes x8 various size nut/bolt matchings. ADL  Equipment Provided: Reacher;Sock aid;Long-handled sponge  Feeding: Modified independent   Grooming: Modified independent  (seated. )  UE Bathing: Modified independent   LE Bathing: Stand by assistance  UE Dressing: Setup (cues for set up to promote ease)  LE Dressing: Minimal assistance (Assist with donning B compression socks. )  Toileting: Supervision  Additional Comments: Pt able to maria de jesus B compression sleeves mod I this date; using reacher to initiate/thread.  Pt has techniques to increase safety and independence with self care tasks  Short term goal 5: Pt will participate in 15+ minutes of therapeutic exercises/functional activities to increase safety and independence with self care tasks. Long term goals  Time Frame for Long term goals : By discharge  Long term goal 1: Pt will complete BADLs with modified independence and good safety  Long term goal 2: Pt will complete functional transfers/mobility during self care tasks with modified independence and good safety with use of least restrictive device  Long term goal 3: Pt will tolerate standing 8+ minutes during functional activity of choice with good safety  Long term goal 4: Pt will complete simple meal prep/light house keeping task with modified independence and good safety   Long term goal 5: Pt will verbalize/demonstrate good understanding of home safety/fall prevention to increase safety and independence with self care tasks.    Long term goals 6: Pt will demonstrated increased  strength and coordination during self care tasks as evident by and an improvement on the 9 hole peg test by 3 seconds and increased  strength by 5#        08/23/21 1020 08/23/21 1342   OT Individual Minutes   Time In 0730 1235   Time Out 0830 1309   Minutes 60 34     Electronically signed by DAYANA Varela on 8/23/21 at 1:47 PM EDT

## 2021-08-23 NOTE — PATIENT CARE CONFERENCE
[address]   ACUTE REHABILITATION  TEAM CONFERENCE NOTE  Date: 21  Patient Name: Eufemia Lepe       Room: 5836/4456-46  MRN: [id number]       : 1960  (59 y.o.)     Gender: female   Referring Practitioner: Dr. Bony Jhaveri cerebrovascular accident (CVA) [facility]) [I63.9]  Diagnosis: CVA     NURSING  Bladder  Always Continent  Bowel   Always Continent  Date of Last BM: 21  Intervention    Both Bowel & Bladder Program     Wounds/Incisions/Ulcers: No skin issues identified  Medication Education Program: Patient able to manage medications and being educated by nursing  Pain: no pain concerns to address    Fall Risk:  Falling star program initiated    PHYSICAL THERAPY       Transfers:  Sit to Stand: Supervision  Stand to sit: Supervision  Bed to Chair: Supervision  Comment: rest breaks PRN. Ambulation 2  Surface - 2: level tile;ramp  Device 2: Rollator  Assistance 2: Supervision  Quality of Gait 2: Slightly unsteady, no LOB noted. Decrease step length, increased LLE shakiness. Gait Deviations: Slow Annie;Decreased step height;Decreased step length; Increased ELISABET  Distance: 120ft incrinments total of 463ft  Comments: seated rest breaks on rollator between distances d/t increased SOB     # Steps : 10 (x2)  Stairs Height:  (4\"/6\")  Rails: Bilateral  Device: No Device  Assistance: Modified independent   Comment: patient performs step over step; seated rest break aftre 10 steps     Equipment Needed: Pt has a rollator of her own. Goals  Time Frame for Short term goals: 5 days   Short term goal 1: Pt. to perform bed mobility independently. Short term goal 2: Pt. to tolerate 30 to 45 minutes of therapeutic exercise to improve strength and endurance for increased functional mobility. Short term goal 3: Pt. to improve seated static and dynamic balance to good. Short term goal 4: Pt. to improve standing static balance to fair+ and standing dynamic balance to fair.    Short term goal 5: Pt. to ambulate 200ft. with supervision using a rollator. Short term goal 6: Pt. to ascend/descend 3 steps with CGA using one railing    OCCUPATIONAL THERAPY  SELF CARE   Equipment Provided: Reacher, Sock aid, Long-handled sponge  Eating            Modified independent    Oral Hygiene            Modified independent  (w/c level )   Shower/Bathe Self             UE Bathing: None (pt declines due to fatigue. ) Typically Mod I at w/c level    LE Bathing: None (pt declines due to fatigue. )  Typically Min/touch assist at w/c level   Upper Body Dressing            Modified independent  (set up/completion at w/c level )   Lower Body Dressing            Putting On/Taking Off Footwear             Minimal assistance (Assist to maria de jesus compression socks/sleeves. )   Toilet Transfer             Toilet - Technique: Stand pivot  Equipment Used: Raised toilet seat with rails  Toilet Transfer: Supervision   Toileting Hygiene            Supervision     Shower Transfers: Stand by assistance  Balance  Sitting Balance: Modified independent   Standing Balance: Supervision (short stands for functional occurence. )  Standing Balance  Time: <1 min x 2   Activity: transfers, LB dressing.      Equipment Recommendations  Equipment Needed: Yes  Reacher: x  Sock Aid: x       Short term goals  Time Frame for Short term goals: By 5-6 days  Short term goal 1: Pt will complete lower body dressing with CGA and good safety  Short term goal 2: Pt will complete functional trasnfers/mobility during self care tasks with supervision and good safety with use of least restrictive device  Short term goal 3: Pt will tolerate standing 5+ minutes during functional activity of choice with good safety   Short term goal 4: Pt will verbalize/demonstrate good understanding of energy conservation techniques to increase safety and independence with self care tasks  Short term goal 5: Pt will participate in 15+ minutes of therapeutic exercises/functional activities to increase safety and independence with self care tasks. SPEECH THERAPY  Supervision for problem solving, Supervision for memory   Short Term Goal: Mod I for problem solving, Mod I for memory       NUTRITION  Weight: 232 lb 9.4 oz (105.5 kg) / Body mass index is 38.7 kg/m². Diet Rx: Carbohydrate Control (4 carb choices per meal), 3-4 gm Na, Low Phosphorus. PO intake has been good. Diet education has been provided; will follow-up prior to discharge. Phosphorus: 5.0 (8/20). Ref. Range 8/22/2021 08:19 8/22/2021 11:37 8/22/2021 15:57 8/22/2021 19:31 8/23/2021 06:21   POC Glucose Latest Ref Range: 65 - 105 mg/dL 145 (H) 252 (H) 224 (H) 277 (H) 156 (H)   Please see nutrition note for details. SOCIAL WORK ASSESSMENT  Assessment:Pt to return home with support from her 2 sisters and [address]. Pt will follow up at [facility] on [facility] at 12:15 PM.   Pre-Admission Status:  Lives With: Family (Lives with mother )  Type of Home:  (Condo )  Home Layout: One level  Home Access: Ramped entrance  Bathroom Shower/Tub: Walk-in shower, Doors (Threshold to step over )  Bathroom Toilet: Handicap height  Bathroom Equipment: Grab bars in shower, Built-in shower seat (Pt. reports using wall of shower to get off toilet)  Bathroom Accessibility: Walker accessible  Home Equipment: 4 wheeled walker, Cane (Pt. 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 uses motorized cart at [employer], sister does grocery shopping typically.    Additional Comments: Pt. sleeps in flat bed at home, bedroom/living room is carpet, kitchen/bathroom linoleum. Pt. looking into getting hand-held shower head. Pt.'s mother becoming less mobile, pt.'s sister helps out mother. Pt. reports that 2 sisters can assist. Pt.s father in [facility] home. Pt. recently had 2 sessions of dialysis at [facility], hasn't had it for 2 days as kidney labs are improving. Pt reports that her 2 sisters live close by and that her one sister is retired and able to assist as needed. Family Education: Family Education not required    Percentage Risk for Readmission: Moderate 19% - 27%   Risk of Unplanned Readmission:  25 %    Critical Items: None        Problem / Barrier Intervention / Plan  Results   Impaired function  Strengthening, balance activities and functional mobility training     Impaired cognition Cognitive retraining exercises, training in use of compensatory memory strategies     Altered ADL Status and safety  Training in modified care strategies and use of devices to support care safety                                            Total Self Care Score    Total Mobility Score  Admission Score:  25      Admission Score:  45  Goal:  42/42         Goal:  82/90   `  Discharge Plan   Estimated Discharge Date: 8/26/21  Home evaluation needed?  Home Evaluation Indication (NO, Requires ReEval, YES/Date): No home evaluation need indicated for patient at this time  Overnight or Day Pass: No  Factors facilitating achievement of predicted outcomes: Family support, Motivated, Cooperative and Pleasant  Barriers to the achievement of predicted outcomes: Pain, Decreased endurance, Upper extremity weakness, Skin Care and Medication managment    Functional Goals at discharge:  Predicted Outcome: Home with Outpatient services PATIENT'S LEVEL OF ASSISTANCE: Modified independence and Frequent Supervision  Discharge therapy goals:  PT: Long term goals  Time Frame for Long term goals : By VT  Long term goal 1: Pt. to perform all transfers independently or with Mod-I. Long term goal 2: Pt. to improve standing static and dynamic balance to good. Long term goal 3: Pt. to ambulate 100ft. on an unlevel/ inclined surface with Mod-I using a rollator. Long term goal 4: Pt. to ascend/descend one flight of stairs with supervision   Long term goal 5: Pt. to ambulate 200ft. in 2 minutes with Mod-I using a rollator. Long term goal 6: Pt. to improve PASS score from 24/36 to 35/36. OT:Long term goals  Time Frame for Long term goals : By discharge  Long term goal 1: Pt will complete BADLs with modified independence and good safety  Long term goal 2: Pt will complete functional transfers/mobility during self care tasks with modified independence and good safety with use of least restrictive device  Long term goal 3: Pt will tolerate standing 8+ minutes during functional activity of choice with good safety  Long term goal 4: Pt will complete simple meal prep/light house keeping task with modified independence and good safety   Long term goal 5: Pt will verbalize/demonstrate good understanding of home safety/fall prevention to increase safety and independence with self care tasks.    Long term goals 6: Pt will demonstrated increased  strength and coordination during self care tasks as evident by and an improvement on the 9 hole peg test by 3 seconds and increased  strength by 5#  ST: Long Term Goals  Time frame for long term goals: By discharge  Long Term goal 1: Mod I for problem solving   Long Term goal 2: Mod I for memory       Team Members Present at Conference:  :  [facility]  Occupational Therapist: Nafisa Driver OT   Physical Therapist: Gilmar Smith PT  Speech Therapist: Jovi MCCALL   Nurse: Nina Damian RN    Dietary/Nutrition: Luke Brito RD, LD  Pastoral Care: Rodney Noble  CMG: Beatrice Osborne RN    I approve the established interdisciplinary plan of care as documented within the

## 2021-08-23 NOTE — PROGRESS NOTES
HEMODIALYSIS PRE-TREATMENT NOTE    Patient Identifiers prior to treatment: name, birthdate and band    Isolation Required: MRSA                     Isolation Type: Contact       (please document if patient is being managed as a PUI/COVID-19 patient)        Hepatitis status:                           Date Drawn                             Result  Hepatitis B Surface Antigen 08/13/2021 neg        Hepatitis B Surface Antibody 08/13/2021 pos     48.51   Hepatitis B Core Antibody 08/13/2021 neg          How was Hepatitis Status verified: labs     Was a copy of the labs you documented provided to facility for the patient's chart: yes    Hemodialysis orders verified: yes    Access Within normal limits ( I.e. s/s of infection,...): yes     Pre-Assessment completed: Yes    Pre-dialysis report received from: Rehab Rn                      Time: 8744

## 2021-08-23 NOTE — PROGRESS NOTES
NEPHROLOGY PROGRESS NOTE    Patient :  Peterson Marte; 59 y.o. MRN# [id number]  Location:  2617/2617-01  Attending:  Vicente Delacruz MD  Admit Date:  8/11/2021   Hospital Day: 15    Reason for consultation: Management of acute kidney injury superimposed on chronic kidney disease stage IV. Consulting physician: Gita Ward MD.    Interval history: Patient was seen and examined today and she does not have shortness of breath at rest.-Had a right IJ tunneled catheter placed today. Patient denies abdominal pain, nausea or vomiting. History of Present Illness: This is a 59 y.o. female with past medical history of longstanding type 2 diabetes insulin-dependent diabetes mellitus, essential hypertension, coronary artery disease status post CABG in 2004, coronary artery stent in 2019, CHF with EF of 55%, history of mild to moderate LVH diastolic dysfunction, CKD 4 with baseline creatinine of about 2.2 to 2.4 mg/dL, patient initially presented to [facility] on 8/1/2021 with right-sided weakness, sudden onset patient was diagnosed with acute/subacute stroke in left frontal lobe with right-sided weakness, patient had CT angiogram during that. And developed acute kidney injury due to contrast-induced nephropathy requiring dialysis serum creatinine peaked to 4.9 mg/dL and patient was started on dialysis on 6 August patient had dialysis on 7 August, and dialysis was held follow kidney function renal recovery and subsequently patient was transferred to acute rehab on 10 August.  Nephrology is consulted for ongoing management of  acute kidney injury on CKD. Patient is feeling better weakness slightly improved patient still have peripheral edema patient is on diuretics most recent serum creatinine is 2.9 mg/dL, BUN 57  Urine output is not measured. During hospital stay at [facility] patient did have urinary retention and had Pantoja catheter which was removed 2 days ago.   Denies any urinary complaints. Medication review showed use of  ARB's and diuretics.     Current Medications:    insulin glargine (LANTUS) injection vial 53 Units, BID  sodium citrate 4 % injection 1.3 mL, PRN  sodium citrate 4 % injection 1.3 mL, PRN  traZODone (DESYREL) tablet 100 mg, Nightly  sodium chloride (OCEAN, BABY AYR) 0.65 % nasal spray 1 spray, PRN  acetaminophen (TYLENOL) tablet 650 mg, Q4H PRN  polyethylene glycol (GLYCOLAX) packet 17 g, Daily  senna (SENOKOT) tablet 17.2 mg, Daily PRN  bisacodyl (DULCOLAX) suppository 10 mg, Daily PRN  febuxostat (ULORIC) tablet 40 mg, Daily  tamsulosin (FLOMAX) capsule 0.4 mg, Daily  gabapentin (NEURONTIN) capsule 300 mg, BID  furosemide (LASIX) tablet 80 mg, BID  pantoprazole (PROTONIX) tablet 40 mg, QAM AC  ranolazine (RANEXA) extended release tablet 1,000 mg, BID  insulin lispro (HUMALOG) injection vial 0-12 Units, TID WC  insulin lispro (HUMALOG) injection vial 0-6 Units, Nightly  glucose (GLUTOSE) 40 % oral gel 15 g, PRN  dextrose 50 % IV solution, PRN  glucagon (rDNA) injection 1 mg, PRN  dextrose 5 % solution, PRN  apixaban (ELIQUIS) tablet 5 mg, BID  aspirin chewable tablet 81 mg, Daily  atorvastatin (LIPITOR) tablet 80 mg, Nightly  busPIRone (BUSPAR) tablet 7.5 mg, TID  carvedilol (COREG) tablet 6.25 mg, BID WC  ferrous sulfate (IRON 325) tablet 325 mg, BID WC      Objective:  CURRENT TEMPERATURE:  Temp: 97.5 °F (36.4 °C)  MAXIMUM TEMPERATURE OVER 24HRS:  Temp (24hrs), Av.4 °F (36.3 °C), Min:97.2 °F (36.2 °C), Max:97.5 °F (36.4 °C)    CURRENT RESPIRATORY RATE:  Resp: 16  CURRENT PULSE:  Pulse: 65  CURRENT BLOOD PRESSURE:  BP: 135/74  24HR BLOOD PRESSURE RANGE:  Systolic (33SQZ), GAW:933 , Min:135 , ZXZ:314   ; Diastolic (30TCK), VUH:02, Min:49, Max:74    24HR INTAKE/OUTPUT:    No intake or output data in the 24 hours ending 21 1459  Patient Vitals for the past 96 hrs (Last 3 readings):   Weight   21 1947 232 lb 9.4 oz (105.5 kg)   21 1602 235 lb 14.3 oz (107 kg)     Physical Exam:  GENERAL APPEARANCE: Alert and cooperative, and appears to be in no acute distress. HEAD: normocephalic  CARDIAC: Normal S1 and S2. No S3, S4 or murmurs. Rhythm is regular. LUNGS: Clear to auscultation and percussion without rales, rhonchi, wheezing or diminished breath sounds. ABDOMEN: Soft with normal bowel sounds; no palpable organomegaly. MUSKULOSKELETAL: Adequately aligned spine. No joint erythema or tenderness. EXTREMITIES: 2+ bilateral lower extremity edema. Peripheral pulses intact. NEURO: Right-sided weakness    Labs:    BMP:   Recent Labs     08/23/21  0639      K 4.2      CO2 29   BUN 41*   CREATININE 3.20*   GLUCOSE 152*   CALCIUM 9.4      Assessment/plan:    1. Acute kidney injury superimposed on chronic kidney disease stage IV differentials include contrast mediated acute tubular necrosis secondary to recent CT angiogram at [facility] and progression of underlying chronic kidney disease. She started acute hemodialysis at [facility] on 8/6/2021. Patient has ongoing indications for intermittent hemodialysis . Plan  Continue  Monday/wedensday/friday schedule  S/P tunneled hemodialysis catheter placed on 8/18/21  Continue to monitor GFR and urine output closely for evidence of renal function. Basic metabolic profile daily. 3 litre fluid removal with HD today. 2.  Systemic hypertension - blood pressure control is adequate. 3.  S/p recent left frontal ischemic stroke with right-sided weakness. Continue physical therapy exercises. 4.  Peripheral edema - continue furosemide 80 mg p.o. twice daily. Prognosis is guarded.     Electronically signed by Aleksandar Chris MD on 8/23/2021 at 2:59 PM

## 2021-08-23 NOTE — PROGRESS NOTES
Physical Therapy  Facility/Department: [facility] ACUTE REHAB  Daily Treatment Note  NAME: Alecia Pagan  : 1960  MRN: [id number]    Date of Service: 2021    Discharge Recommendations:  Patient would benefit from continued therapy after discharge, Home with assist PRN   PT Equipment Recommendations  Equipment Needed: No    Assessment   Body structures, Functions, Activity limitations: Decreased functional mobility ; Decreased strength;Decreased endurance;Decreased balance; Increased pain  Treatment Diagnosis: CVA  Specific instructions for Next Treatment: Balance training, transfer training, gait training  REQUIRES PT FOLLOW UP: Yes  Activity Tolerance  Activity Tolerance: Patient Tolerated treatment well;Patient limited by endurance; Patient limited by fatigue     Patient Diagnosis(es): There were no encounter diagnoses. has a past medical history of Backache, unspecified, CHF (congestive heart failure) ([facility] [address] Chronic kidney disease, Coronary atherosclerosis of artery bypass graft, Cramp of limb, Gallstones, Hypertension, Insomnia, Pneumonia, Type II or unspecified type diabetes mellitus with renal manifestations, not stated as uncontrolled(250.40), Type II or unspecified type diabetes mellitus without mention of complication, not stated as uncontrolled, and Unspecified vitamin D deficiency. has a past surgical history that includes Coronary artery bypass graft; Knee arthroscopy; Carpal tunnel release; Breast surgery; Tonsillectomy; Hand surgery; Ankle fracture surgery; Cholecystectomy, open (N/A); and IR TUNNELED CVC PLACE WO SQ PORT/PUMP > 5 YEARS (2021). Restrictions  Restrictions/Precautions  Restrictions/Precautions: Fall Risk  Required Braces or Orthoses?: Yes (B lymphedema wraps. )  Required Braces or Orthoses  Right Lower Extremity Brace:  (Lymphedema brace )  Left Lower Extremity Brace:  (Lymphedema brace )  Subjective   General  Chart Reviewed:  Yes  Additional Pertinent Hx: Presented with acute right arm weakness and mental status change. MRI brain showed probable acute to subacute lacunar infarct of the left frontal lobe. AURELIA on CKD stage 4 likely d/t contrast induced nephropathy. Dialysis was initiated on 8/6/21, had 2 treatment session, labs improving. Pt. has history of diabetes mellitus type II, CAD s/p CABG (2005), cervical stenosis, back pain, lymphedema, diastolic CHF, DVT, urinary retention, and iron deficiency anemia. Pt. admitted to ARU from [place] [address] 8/11/21. Response To Previous Treatment: Patient with no complaints from previous session. Family / Caregiver Present: Yes (sister )  Referring Practitioner: Dr. Kelli Fish: Patient eager and motivated for therapy  Pain Screening  Patient Currently in Pain: Denies  Vital Signs  Patient Currently in Pain: Denies       Orientation  Orientation  Overall Orientation Status: Within Normal Limits  Objective   Bed mobility  Rolling to Left: Modified independent  Rolling to Right: Modified independent  Supine to Sit: Modified independent  Sit to Supine: Modified independent  Scooting: Modified independent  Transfers  Sit to Stand: Modified independent  Stand to sit: Modified independent  Bed to Chair: Modified independent  Stand Pivot Transfers: Modified independent  Comment: Transfers assessed with rollator. Steady, no LOB noted  Ambulation  Ambulation?: Yes  Ambulation 1  Surface: level tile;ramp  Device: Rollator  Assistance: Modified Independent  Distance: 283ft  Stairs/Curb  Stairs?: Yes  Stairs  # Steps : 15 (x2)  Stairs Height:  (4\"/6\")  Rails: Bilateral  Device: No Device  Assistance: Modified independent   Comment: patient performs step over step; seated rest break aftre 15 steps         Other exercises  Other exercises?: Yes  Other exercises 1: family education provided to sisiter regarding transfers, gait and stairs.  Answered all questions at this time   Other exercises 2: seated B LE exercises x20 reps #3 and purple tband   Other exercises 3: seated UBE 5 minutes FWD/BWD   Other exercises 4: NuStep L4 x15 minutes   Other exercises 5: stand pivot transfer from WC to NuStep x2 no device         Comment: rest breaks PRN. Goals  Short term goals  Time Frame for Short term goals: 5 days   Short term goal 1: Pt. to perform bed mobility independently. Short term goal 2: Pt. to tolerate 30 to 45 minutes of therapeutic exercise to improve strength and endurance for increased functional mobility. Short term goal 3: Pt. to improve seated static and dynamic balance to good. Short term goal 4: Pt. to improve standing static balance to fair+ and standing dynamic balance to fair. Short term goal 5: Pt. to ambulate 200ft. with supervision using a rollator. Short term goal 6: Pt. to ascend/descend 3 steps with CGA using one railing  Long term goals  Time Frame for Long term goals : By DC  Long term goal 1: Pt. to perform all transfers independently or with Mod-I. Long term goal 2: Pt. to improve standing static and dynamic balance to good. Long term goal 3: Pt. to ambulate 100ft. on an unlevel/ inclined surface with Mod-I using a rollator. Long term goal 4: Pt. to ascend/descend one flight of stairs with supervision   Long term goal 5: Pt. to ambulate 200ft. in 2 minutes with Mod-I using a rollator. Long term goal 6: Pt. to improve PASS score from 24/36 to 35/36. Patient Goals   Patient goals : Improve balance and strength to be able to walk independently with rollator.      Plan    Plan  Times per week: 1.5hrs/day  Times per day: Daily  Specific instructions for Next Treatment: Balance training, transfer training, gait training  Current Treatment Recommendations: Strengthening, Balance Training, Transfer Training, Functional Mobility Training, ROM, Endurance Training, Gait Training, Stair training, Pain Management, Home Exercise Program, Safety Education & Training, Neuromuscular Re-education, Patient/Caregiver Education & Training, Equipment Evaluation, Education, & procurement  Safety Devices  Type of devices:  All fall risk precautions in place, Call light within reach, Gait belt, Patient at risk for falls, Nurse notified        08/23/21 1616   PT Individual Minutes   Time In 1001   Time Out 1130   Minutes 89     Electronically signed by Sherry Polk PTA on 8/23/21 at 4:28 PM EDT

## 2021-08-23 NOTE — PLAN OF CARE
Problem: Pain:  Goal: Pain level will decrease  Description: Pain level will decrease  Pain assessment completed so far this shift. Pt. able to rest after the use of pain medication. Patient denies any pain so far this shift. Will continue to monitor. Problem: Falls - Risk of:  Goal: Will remain free from falls  Description: Will remain free from falls  Outcome: Met This Shift   No falls or injuries sustained at this time. No attempts to get out of bed without nursing assistance. Call light within reach and pt. uses appropriately for assistance. Siderails up x 2. Nonskid footwear remains on. Bed in low and locked position. Hourly nursing rounds made. Pt. Alert and oriented, aware of limitations, and exhibits good safety judgement. Pt. uses assistive devices appropriately. Pt. understands individual fall risk factors. Pt. reminded to use call light with each nurse/patient interaction. Pt. room located close to nurse's station. Bed alarm remains engaged throughout the shift as a precaution. Problem: Skin Integrity:  Goal: Absence of new skin breakdown  Description: Absence of new skin breakdown  Outcome: Met This Shift   Skin assessment completed this shift. Nutrition and Hydration status assessed with adequate intake. Rashel Score as charted. Chair cushion intact for when pt is up to chair. Bilateral heels remain elevated on pillows throughout the shift. Patient able to reposition self for comfort and to prevent breakdown. Patient verbalizes understanding of pressure ulcer prevention measures. Skin integrity maintained. No new skin breakdown noted. Skin to high risk pressure areas including coccyx and heels are clear. Elena care provided as needed throughout the shift per pt. Abrasion noted to RLE (calf) Mepilex C,D,I  No S/S of infection noted. Will continue to monitor.

## 2021-08-23 NOTE — PLAN OF CARE
Problem: Falls - Risk of:  Goal: Will remain free from falls  Description: Will remain free from falls  2021 by Omega Elias RN  Outcome: Ongoing  2021 05 by Parth Bhatia RN  Outcome: Met This Shift  Goal: Absence of physical injury  Description: Absence of physical injury  Outcome: Ongoing     Problem: Skin Integrity:  Goal: Will show no infection signs and symptoms  Description: Will show no infection signs and symptoms  Outcome: Ongoing  Goal: Absence of new skin breakdown  Description: Absence of new skin breakdown  2021 by Omega Elias RN  Outcome: Ongoing  2021 05 by Parth Bhatia RN  Outcome: Met This Shift     Problem: Pain:  Goal: Pain level will decrease  Description: Pain level will decrease  2021 by Omega Elias RN  Outcome: Ongoing  2021 by Parth Bhatia RN  Outcome: Met This Shift  Goal: Control of acute pain  Description: Control of acute pain  Outcome: Ongoing  Goal: Control of chronic pain  Description: Control of chronic pain  Outcome: Ongoing

## 2021-08-23 NOTE — PROGRESS NOTES
Physical Medicine & Rehabilitation  Progress Note      Subjective:      Lenny Barrera is a 59 y.o. female with ischemic CVA. She reports doing well today. She states that the intermittent tremors/spasms in the right upper limb seems to be improving. She notes that chronic back pain is controlled at this time. She denies any other acute concerns. ROS:  Denies fevers, chills, sweats. No chest pain, palpitations, lightheadedness. Denies coughing, wheezing or shortness of breath. Denies abdominal pain, nausea, diarrhea or constipation. No new areas of joint pain. Denies new areas of numbness or weakness. Denies new anxiety or depression issues. No new skin problems. Rehabilitation:   Progressing in therapies. PT:  Restrictions/Precautions: Fall Risk  Required Braces or Orthoses  Right Lower Extremity Brace:  (Lymphedema brace )  Left Lower Extremity Brace:  (Lymphedema brace )   Transfers  Sit to Stand: Modified independent  Stand to sit: Modified independent  Bed to Chair: Modified independent  Stand Pivot Transfers: Modified independent  Lateral Transfers: Contact guard assistance  Comment: Transfers assessed with rollator. Steady, no LOB noted  Ambulation 1  Surface: level tile, ramp  Device: Rollator  Assistance: Modified Independent  Quality of Gait: no LOB noted. Decrease step length, increased LLE shakiness. Gait Deviations: Slow Annie, Decreased step height, Decreased step length, Increased ELISABET  Distance: 283ft  Comments: patient ambulated to and from therapy gym with 1 seated rest break during distances     Transfers  Sit to Stand: Modified independent  Stand to sit: Modified independent  Bed to Chair: Modified independent  Stand Pivot Transfers: Modified independent  Lateral Transfers: Contact guard assistance  Comment: Transfers assessed with rollator.  Steady, no LOB noted  Ambulation  Ambulation?: Yes  Ambulation 1  Surface: level tile, ramp  Device: Rollator  Assistance: Modified Independent  Quality of Gait: no LOB noted. Decrease step length, increased LLE shakiness. Gait Deviations: Slow Annie, Decreased step height, Decreased step length, Increased ELISABET  Distance: 283ft  Comments: patient ambulated to and from therapy gym with 1 seated rest break during distances     Surface: level tile, ramp  Ambulation 1  Surface: level tile, ramp  Device: Rollator  Assistance: Modified Independent  Quality of Gait: no LOB noted. Decrease step length, increased LLE shakiness. Gait Deviations: Slow Annie, Decreased step height, Decreased step length, Increased ELISABET  Distance: 283ft  Comments: patient ambulated to and from therapy gym with 1 seated rest break during distances     OT:  ADL  Equipment Provided: Reacher, Sock aid, Long-handled sponge  Feeding: Modified independent   Grooming: Modified independent  (seated. )  UE Bathing: Modified independent   LE Bathing: Stand by assistance  UE Dressing: Setup (cues for set up to promote ease)  LE Dressing: Minimal assistance (Assist with donning B compression socks. )  Toileting: Supervision  Additional Comments: Pt able to maria de jesus B compression sleeves mod I this date; using reacher to initiate/thread. Pt has compression brandon at home will increase ease and assist of donning. Pt also states her sister will be over in morning to assist her mother with breakfast, she could help maria de jesus them if needed. Instrumental ADL's  Instrumental ADLs: Yes     Balance  Sitting Balance: Modified independent   Standing Balance: Stand by assistance (supervision to stand by )   Standing Balance  Time: AM: ~2 min, ~1 min x2, <1 min   Activity: AM: functional transfers, self care tasks. Comment: with RW. no LOB noted.    Functional Mobility  Functional - Mobility Device: Rolling Walker  Activity: To/from bathroom  Assist Level: Stand by assistance (supervision/stand by )  Functional Mobility Comments: Verbal cues for hand placement and safety     Bed mobility  Bridging: Stand by assistance  Rolling to Left: Modified independent  Rolling to Right: Modified independent  Supine to Sit: Modified independent  Sit to Supine: Modified independent  Scooting: Modified independent  Comment: HOB slightly elevated  Transfers  Sit to stand: Supervision  Stand to sit: Supervision  Transfer Comments: Verbal cues for hand placement and safety   Toilet Transfers  Toilet - Technique: Ambulating  Equipment Used: Raised toilet seat with rails (side rail on R side. )  Toilet Transfer: Supervision  Toilet Transfers Comments: grab rail      Shower Transfers  Shower - Transfer From: Walker  Shower - Transfer Type: To and From  Shower - Transfer To: Transfer tub bench  Shower - Technique: Ambulating  Shower Transfers: Stand by assistance  Shower Transfers Comments: Increased time with verbal cues for safety over threshold. SPEECH:  Subjective: [x]? Alert     [x]? Cooperative     []? Confused     []? Agitated    []? Lethargic     Objective/Assessment:  Attention: Functional throughout     Recall: n/a      Problem Solving/Reasoning:  Pt. Minerva completed 5 step ADL written sequencing. Pt. Completed written directions c 85% accuracy, 100% c cues.     Other:  Pt. Sister entered room during session, pt. Ricki Canales. Sister re: ST exercises.        Current Medications:   Current Facility-Administered Medications: insulin glargine (LANTUS) injection vial 53 Units, 53 Units, Subcutaneous, BID  sodium citrate 4 % injection 1.3 mL, 1.3 mL, Intracatheter, PRN  sodium citrate 4 % injection 1.3 mL, 1.3 mL, Intracatheter, PRN  traZODone (DESYREL) tablet 100 mg, 100 mg, Oral, Nightly  sodium chloride (OCEAN, BABY AYR) 0.65 % nasal spray 1 spray, 1 spray, Each Nostril, PRN  acetaminophen (TYLENOL) tablet 650 mg, 650 mg, Oral, Q4H PRN  polyethylene glycol (GLYCOLAX) packet 17 g, 17 g, Oral, Daily  senna (SENOKOT) tablet 17.2 mg, 2 tablet, Oral, Daily PRN  bisacodyl (DULCOLAX) suppository 10 mg, 10 mg, Rectal, Daily PRN  febuxostat (ULORIC) tablet 40 mg, 40 mg, Oral, Daily  tamsulosin (FLOMAX) capsule 0.4 mg, 0.4 mg, Oral, Daily  gabapentin (NEURONTIN) capsule 300 mg, 300 mg, Oral, BID  furosemide (LASIX) tablet 80 mg, 80 mg, Oral, BID  pantoprazole (PROTONIX) tablet 40 mg, 40 mg, Oral, QAM AC  ranolazine (RANEXA) extended release tablet 1,000 mg, 1,000 mg, Oral, BID  insulin lispro (HUMALOG) injection vial 0-12 Units, 0-12 Units, Subcutaneous, TID WC  insulin lispro (HUMALOG) injection vial 0-6 Units, 0-6 Units, Subcutaneous, Nightly  glucose (GLUTOSE) 40 % oral gel 15 g, 15 g, Oral, PRN  dextrose 50 % IV solution, 12.5 g, Intravenous, PRN  glucagon (rDNA) injection 1 mg, 1 mg, Intramuscular, PRN  dextrose 5 % solution, 100 mL/hr, Intravenous, PRN  apixaban (ELIQUIS) tablet 5 mg, 5 mg, Oral, BID  aspirin chewable tablet 81 mg, 81 mg, Oral, Daily  atorvastatin (LIPITOR) tablet 80 mg, 80 mg, Oral, Nightly  busPIRone (BUSPAR) tablet 7.5 mg, 7.5 mg, Oral, TID  carvedilol (COREG) tablet 6.25 mg, 6.25 mg, Oral, BID WC  ferrous sulfate (IRON 325) tablet 325 mg, 325 mg, Oral, BID WC      Objective:  BP (!) 130/57   Pulse 68   Temp 98.3 °F (36.8 °C) (Oral)   Resp 16   Ht 5' 5\" (1.651 m)   Wt 228 lb 2.8 oz (103.5 kg)   SpO2 99%   BMI 37.97 kg/m²       GEN: Well developed, well nourished, no acute distress  HEENT: NCAT. EOM grossly intact. Hearing grossly intact. Mucous membranes pink and moist.  RESP: Normal breath sounds with no wheezing, rales, or rhonchi. Respirations WNL and unlabored. CV: Regular rate and rhythm. No murmurs, rubs, or gallops. ABD: Soft, non-distended, BS+ and equal.  NEURO: Alert. Speech fluent. MSK:  Muscle bulk is normal bilaterally. Moving bilateral upper limbs with at least antigravity strength. Strength 4+/5 in bilateral lower limbs. LIMBS:  Edema present in bilateral lower limbs - stable (has lymphedema wraps in place). SKIN: Warm and dry with good turgor. PSYCH: Mood WNL. Medicine for medical management      Electronically signed by Prakash Prieto MD on 8/23/2021 at 10:44 PM

## 2021-08-23 NOTE — PROGRESS NOTES
[facility] Internal Medicine    CONSULTATION / HISTORY AND PHYSICAL EXAMINATION            Date:   8/23/2021  Patient name:  Everette Meckel  Date of admission:  8/11/2021  5:47 PM  MRN:   [id number]  Account:  [de-identified]  YOB: 1960  PCP:    AFSANEH Schmidt CNP  Room:   [facility]1522-  Code Status:    Full Code    Physician Requesting Consult: Adrian Lundy MD    Reason for Consult:  medical management    Chief Complaint:     No chief complaint on file. Right upper and lower extremity weakness  History Obtained From:     Patient medical record nursing staff    History of Present Illness:   73-year-old  lady morbidly obese class III BMI 40.74 initially admitted to [facility] with right arm weakness and altered mental status she had an MRI done which showed acute lacunar infarct of the left frontal lobe medically managed history of chronic kidney disease had acute kidney injury required hemodialysis with right-sided Mauricio catheter in place in the jugular vein with improving renal function hemodialysis on hold    Noted to have some epistaxis this morning refusing to have anterior nasal packing done patient on anticoagulants  Multiple comorbid condition including hypertension hyperlipidemia coronary disease congestive heart failure diabetes mellitus    8/14   Patient is doing much better today  No new complaints    8/17   Plan for tunnel catheter  Blood sugars running high.   Past Medical History:     Past Medical History:   Diagnosis Date    Backache, unspecified     CHF (congestive heart failure) (HCC)     Chronic kidney disease     Coronary atherosclerosis of artery bypass graft     Cramp of limb     Gallstones     Hypertension     Insomnia     Pneumonia     Type II or unspecified type diabetes mellitus with renal manifestations, not stated as uncontrolled(250.40)     Type II or unspecified type diabetes mellitus without mention of complication, not stated as uncontrolled     Unspecified vitamin D deficiency         Past Surgical History:     Past Surgical History:   Procedure Laterality Date    ANKLE FRACTURE SURGERY      BREAST SURGERY      CARPAL TUNNEL RELEASE      CHOLECYSTECTOMY, OPEN N/A     CORONARY ARTERY BYPASS GRAFT      x3    HAND SURGERY      IR TUNNELED CATHETER PLACEMENT GREATER THAN 5 YEARS  8/18/2021    IR TUNNELED CATHETER PLACEMENT GREATER THAN 5 YEARS 8/18/2021 [facility] PROCED[facility]    KNEE ARTHROSCOPY      right    TONSILLECTOMY          Medications Prior to Admission:     Prior to Admission medications    Medication Sig Start Date End Date Taking? Authorizing Provider   busPIRone (BUSPAR) 7.5 MG tablet TAKE 1 TABLET BY MOUTH THREE TIMES DAILY 7/1/21   Historical Provider, MD   gabapentin (NEURONTIN) 300 MG capsule TAKE 1 CAPSULE BY MOUTH TWICE DAILY 6/21/21 7/23/21  AFSANEH Payton CNP   ELIQUIS 5 MG TABS tablet  6/5/21   Historical Provider, MD   blood glucose test strips (DEN CONTOUR TEST) strip 1 each by In Vitro route 3 times daily As needed. 4/30/21   AFSANEH Payton CNP   Dulaglutide (TRULICITY) 1.5 VU/3.0QQ SOPN 1.5 mg    Historical Provider, MD   insulin glargine (BASAGLAR KWIKPEN) 100 UNIT/ML injection pen Basaglar KwikPen U-100 Insulin 100 unit/mL (3 mL) subcutaneous   Inject 43 units twice a day by subcutaneous route as directed for 90 days.     Historical Provider, MD   insulin lispro, 1 Unit Dial, (HUMALOG KWIKPEN) 100 UNIT/ML SOPN Humalog KwikPen (U-100) Insulin 100 unit/mL subcutaneous   15 units with each meal plus 2-10 units as SS if BS>150    Historical Provider, MD   Biotin w/ Vitamins C & E (HAIR/SKIN/NAILS PO) Take 200 mg by mouth daily    Historical Provider, MD   allopurinol (ZYLOPRIM) 100 MG tablet Take 100 mg by mouth daily    Historical Provider, MD   sharps container 1 each by Does not apply route as needed (dirty pen needles) 4/19/21   AFSANEH Payton CNP ferrous sulfate (BRIAN-ARCHIE) 325 (65 Fe) MG tablet Take 1 tablet by mouth daily 4/19/21   [facility], APRN - CNP   allopurinol (ZYLOPRIM) 100 MG tablet Take 1 tablet by mouth daily 4/14/21   [facility], APRN - CNP   isosorbide mononitrate (IMDUR) 30 MG extended release tablet Take 1 tablet by mouth daily 4/9/21   Rhonda Navarro MD   traZODone (DESYREL) 50 MG tablet Take 75 mg by mouth nightly    Historical Provider, MD   bumetanide (BUMEX) 2 MG tablet Take 1 tablet by mouth daily 3/30/21   [facility], APRANAHY - CNP   atorvastatin (LIPITOR) 40 MG tablet Take 1 tablet by mouth daily 3/30/21   [facility], AFSANEH - CNP   carvedilol (COREG) 6.25 MG tablet Take 1 tablet by mouth 2 times daily 3/30/21   [facility], APRN - CNP   ranolazine (RANEXA) 500 MG extended release tablet Take 1 tablet by mouth 2 times daily  Patient taking differently: Take 1,000 mg by mouth 2 times daily  3/30/21   [facility], APRN - CNP   nitroGLYCERIN (NITROSTAT) 0.4 MG SL tablet Place 1 tablet under the tongue every 5 minutes as needed for Chest pain. 3/29/15   AFSANEH Cardenas CNP   Insulin Pen Needle (BD ULTRA-FINE PEN NEEDLES) 29G X 12.7MM MISC 1 each by Does not apply route 6 times daily. For use with each insulin 12/23/14   Jani Sylvester MD   docusate sodium (COLACE) 100 MG capsule Take 100 mg by mouth 2 times daily. Historical Provider, MD   Lancets 28G MISC Inject 1 each into the skin 3 times daily. 10/30/14   Jani Sylvester MD        Allergies:     Adhesive tape, Ace inhibitors, Iv dye [iodides], and Metformin and related    Social History:     Tobacco:    reports that she has never smoked. She has never used smokeless tobacco.  Alcohol:      reports no history of alcohol use. Drug Use:  reports no history of drug use.     Family History:     Family History   Problem Relation Age of Onset    Diabetes Father     Heart Failure Father        Review of Systems:     Positive and Negative as described in HPI.    CONSTITUTIONAL:  negative for fevers, chills, sweats, fatigue, weight loss  HEENT:  negative for vision, hearing changes, runny nose, throat pain    RESPIRATORY:  negative for shortness of breath, cough, congestion, wheezing. CARDIOVASCULAR:  negative for chest pain, palpitations. GASTROINTESTINAL:  negative for nausea, vomiting, diarrhea, constipation, change in bowel habits, abdominal pain   GENITOURINARY:  negative for difficulty of urination, burning with urination, frequency   INTEGUMENT:  negative for rash, skin lesions, easy bruising   HEMATOLOGIC/LYMPHATIC:  negative for swelling/edema   ALLERGIC/IMMUNOLOGIC:  negative for urticaria , itching  ENDOCRINE:  negative increase in drinking, increase in urination, hot or cold intolerance  MUSCULOSKELETAL:  negative joint pains, muscle aches, swelling of joints  NEUROLOGICAL: Positive for right upper extremity and right lower extremity mild weakness  BEHAVIOR/PSYCH: Denies depression    Physical Exam:     /74   Pulse 65   Temp 97.5 °F (36.4 °C)   Resp 16   Ht 5' 5\" (1.651 m)   Wt 232 lb 9.4 oz (105.5 kg)   SpO2 92%   BMI 38.70 kg/m²   Temp (24hrs), Av.4 °F (36.3 °C), Min:97.2 °F (36.2 °C), Max:97.5 °F (36.4 °C)    Recent Labs     21  1137 21  1557 21  1931 21  0621   POCGLU 252* 224* 277* 156*     No intake or output data in the 24 hours ending 21 1516  Dialysis catheter noted in the jugular vein right side of the neck  General Appearance:  alert, well appearing, and in no acute distress  Mental status: oriented to person, place, and time with normal affect  Head:  normocephalic, atraumatic.   Eye: no icterus, redness, pupils equal and reactive, extraocular eye movements intact, conjunctiva clear  Ear: normal external ear, no discharge, hearing intact  Nose:  no drainage noted  Small amount of epistaxis noted    Mouth: mucous membranes moist  Neck: supple, no carotid bruits, thyroid not palpable  Lungs: Bilateral equal air entry, clear to ausculation, no wheezing, rales or rhonchi, normal effort  Cardiovascular: normal rate, regular rhythm, no murmur, gallop, rub. Abdomen: Soft, nontender, nondistended, normal bowel sounds, no hepatomegaly or splenomegaly  Neurologic: There are no new focal motor or sensory deficits, normal muscle tone and bulk, no abnormal sensation, normal speech, cranial nerves II through XII grossly intact  Positive for right upper and right lower extremity 4 out of 5 strength strength  Skin: No gross lesions, rashes, bruising or bleeding on exposed skin area  Extremities:  peripheral pulses palpable, no pedal edema or calf pain with palpation  Psych:      Investigations:      Laboratory Testing:  Recent Results (from the past 24 hour(s))   POC Glucose Fingerstick    Collection Time: 08/22/21  3:57 PM   Result Value Ref Range    POC Glucose 224 (H) 65 - 105 mg/dL   POC Glucose Fingerstick    Collection Time: 08/22/21  7:31 PM   Result Value Ref Range    POC Glucose 277 (H) 65 - 105 mg/dL   POC Glucose Fingerstick    Collection Time: 08/23/21  6:21 AM   Result Value Ref Range    POC Glucose 156 (H) 65 - 105 mg/dL   Basic Metabolic Panel w/ Reflex to MG    Collection Time: 08/23/21  6:39 AM   Result Value Ref Range    Glucose 152 (H) 70 - 99 mg/dL    BUN 41 (H) 8 - 23 mg/dL    CREATININE 3.20 (H) 0.50 - 0.90 mg/dL    Bun/Cre Ratio NOT REPORTED 9 - 20    Calcium 9.4 8.6 - 10.4 mg/dL    Sodium 139 135 - 144 mmol/L    Potassium 4.2 3.7 - 5.3 mmol/L    Chloride 100 98 - 107 mmol/L    CO2 29 20 - 31 mmol/L    Anion Gap 10 9 - 17 mmol/L    GFR Non-African American 15 (L) >60 mL/min    GFR  18 (L) >60 mL/min    GFR Comment          GFR Staging NOT REPORTED            Consultations:   IP CONSULT TO DIETITIAN  IP CONSULT TO SOCIAL WORK  IP CONSULT TO INTERNAL MEDICINE  IP CONSULT TO NEPHROLOGY  Assessment :      Primary Problem  <principal problem not specified>    Active Hospital Problems Diagnosis Date Noted    Acute cerebrovascular accident (CVA) ([facility] [I63.9] 08/11/2021    Chronic ischemic heart disease [I25.9] 07/23/2021    Anemia in stage 4 chronic kidney disease ([facility] [N18.4, D63.1] 05/03/2021    Essential hypertension [I10] 05/03/2021    History of coronary artery bypass graft [Z95.1] 03/31/2021    Diabetic polyneuropathy associated with type 2 diabetes mellitus ([facility] [E11.42] 02/17/2020    Chronic diastolic heart failure ([facility] [I50.32] 09/20/2018    Mixed hyperlipidemia [E78.2] 07/27/2016    Coronary artery disease [I25.10] 05/04/2015   Active Problems:    Coronary artery disease    Chronic diastolic heart failure (HCC)    Diabetic polyneuropathy associated with type 2 diabetes mellitus ([facility]    History of coronary artery bypass graft    Mixed hyperlipidemia    Essential hypertension    Anemia in stage 4 chronic kidney disease ([facility]    Chronic ischemic heart disease    Acute cerebrovascular accident (CVA) ([facility]  Resolved Problems:    * No resolved hospital problems.  *        Plan:     68-year-old lady morbidly obese class III BMI 40.74 history of chronic kidney disease hypertension coronary disease chronic diastolic congestive heart failure  New diagnosis of acute lacunar infarct with right upper and lower extremity weakness  Acute on chronic kidney disease required hemodialysis with a Mauricio catheter  Diabetes mellitus with neuropathy Lantus sliding scale, some readings of high blood sugars, increasing Lantus to 53 units twice a day, monitoring closely  Gabapentin continue for diabetic neuropathy  Anemia of chronic kidney disease received IV iron  Coronary artery disease continue carvedilol and Ranexa  Chronic diastolic congestive heart failure compensated continue Lasix  Plan for hd per nephrologist        Christal Wright MD  8/23/2021  3:16 PM    Copy sent to Dr. Jessika Coleman, APRN - CNP    Please note that this chart was generated using voice recognition Dragon dictation software. Although every effort was made to ensure the accuracy of this automated transcription, some errors in transcription may have occurred.

## 2021-08-23 NOTE — PROGRESS NOTES
HEMODIALYSIS POST TREATMENT NOTE    Treatment time [id number]     UltraFiltration Goal: 3000   UltraFiltration Removed: 2500      Pre Treatment weight: 105.5  Post Treatment weight: 103.5  Estimated Dry Weight: na    Access used:     Central Venous Catheter:          Tunneled or Non-tunneled: tunneled           Site: right chest          Access Flow: good      Internal Access:       AV Fistula or AV Graft:na         Site: na       Access Flow: na       Sign and symptoms of infection: no       If YES: na    Medications or blood products given: na    Chronic outpatient schedule: na    Chronic outpatient unit: na    Summary of response to treatment: pt did well    Explain if orders NOT met, was physician notified:trinh      ACES flowsheet faxed to patient unit/ placed in patient chart: yes    Post assessment completed: yes    Report given to: Pascual Diehl 96 documented Safety Checks include the followin) Access and face visible at all times. 2) All connections and blood lines are secure with no kinks. 3) NVL alarm engaged. 4) Hemosafe device applied (if applicable). 5) No collapse of Arterial or Venous blood chambers. 6) All blood lines / pump segments in the air detectors.

## 2021-08-24 LAB
GLUCOSE BLD-MCNC: 190 MG/DL (ref 65–105)
GLUCOSE BLD-MCNC: 279 MG/DL (ref 65–105)
GLUCOSE BLD-MCNC: 95 MG/DL (ref 65–105)

## 2021-08-24 PROCEDURE — 97110 THERAPEUTIC EXERCISES: CPT

## 2021-08-24 PROCEDURE — 6370000000 HC RX 637 (ALT 250 FOR IP): Performed by: PHYSICAL MEDICINE & REHABILITATION

## 2021-08-24 PROCEDURE — 97535 SELF CARE MNGMENT TRAINING: CPT

## 2021-08-24 PROCEDURE — 97530 THERAPEUTIC ACTIVITIES: CPT

## 2021-08-24 PROCEDURE — 1180000000 HC REHAB R&B

## 2021-08-24 PROCEDURE — 82947 ASSAY GLUCOSE BLOOD QUANT: CPT

## 2021-08-24 PROCEDURE — 6370000000 HC RX 637 (ALT 250 FOR IP): Performed by: INTERNAL MEDICINE

## 2021-08-24 PROCEDURE — 99232 SBSQ HOSP IP/OBS MODERATE 35: CPT | Performed by: INTERNAL MEDICINE

## 2021-08-24 PROCEDURE — 99232 SBSQ HOSP IP/OBS MODERATE 35: CPT | Performed by: STUDENT IN AN ORGANIZED HEALTH CARE EDUCATION/TRAINING PROGRAM

## 2021-08-24 PROCEDURE — 97116 GAIT TRAINING THERAPY: CPT

## 2021-08-24 RX ADMIN — TRAZODONE HYDROCHLORIDE 100 MG: 100 TABLET ORAL at 20:50

## 2021-08-24 RX ADMIN — ASPIRIN 81 MG: 81 TABLET, CHEWABLE ORAL at 07:35

## 2021-08-24 RX ADMIN — RANOLAZINE 1000 MG: 1000 TABLET, FILM COATED, EXTENDED RELEASE ORAL at 07:36

## 2021-08-24 RX ADMIN — STANDARDIZED SENNA CONCENTRATE 17.2 MG: 8.6 TABLET ORAL at 07:39

## 2021-08-24 RX ADMIN — FUROSEMIDE 80 MG: 40 TABLET ORAL at 07:35

## 2021-08-24 RX ADMIN — INSULIN LISPRO 1 UNITS: 100 INJECTION, SOLUTION INTRAVENOUS; SUBCUTANEOUS at 20:51

## 2021-08-24 RX ADMIN — POLYETHYLENE GLYCOL 3350 17 G: 17 POWDER, FOR SOLUTION ORAL at 07:35

## 2021-08-24 RX ADMIN — BUSPIRONE HYDROCHLORIDE 7.5 MG: 7.5 TABLET ORAL at 20:51

## 2021-08-24 RX ADMIN — BUSPIRONE HYDROCHLORIDE 7.5 MG: 7.5 TABLET ORAL at 07:36

## 2021-08-24 RX ADMIN — INSULIN GLARGINE 53 UNITS: 100 INJECTION, SOLUTION SUBCUTANEOUS at 09:20

## 2021-08-24 RX ADMIN — APIXABAN 5 MG: 5 TABLET, FILM COATED ORAL at 07:34

## 2021-08-24 RX ADMIN — GABAPENTIN 300 MG: 300 CAPSULE ORAL at 07:35

## 2021-08-24 RX ADMIN — APIXABAN 5 MG: 5 TABLET, FILM COATED ORAL at 20:50

## 2021-08-24 RX ADMIN — FEBUXOSTAT 40 MG: 40 TABLET, FILM COATED ORAL at 07:36

## 2021-08-24 RX ADMIN — ATORVASTATIN CALCIUM 80 MG: 80 TABLET, FILM COATED ORAL at 20:50

## 2021-08-24 RX ADMIN — GABAPENTIN 300 MG: 300 CAPSULE ORAL at 20:50

## 2021-08-24 RX ADMIN — PANTOPRAZOLE SODIUM 40 MG: 40 TABLET, DELAYED RELEASE ORAL at 06:09

## 2021-08-24 RX ADMIN — TAMSULOSIN HYDROCHLORIDE 0.4 MG: 0.4 CAPSULE ORAL at 07:40

## 2021-08-24 RX ADMIN — ACETAMINOPHEN 650 MG: 325 TABLET, FILM COATED ORAL at 03:21

## 2021-08-24 RX ADMIN — INSULIN GLARGINE 53 UNITS: 100 INJECTION, SOLUTION SUBCUTANEOUS at 20:51

## 2021-08-24 RX ADMIN — RANOLAZINE 1000 MG: 1000 TABLET, FILM COATED, EXTENDED RELEASE ORAL at 20:51

## 2021-08-24 RX ADMIN — BUSPIRONE HYDROCHLORIDE 7.5 MG: 7.5 TABLET ORAL at 13:44

## 2021-08-24 RX ADMIN — INSULIN LISPRO 6 UNITS: 100 INJECTION, SOLUTION INTRAVENOUS; SUBCUTANEOUS at 17:17

## 2021-08-24 ASSESSMENT — PAIN SCALES - GENERAL
PAINLEVEL_OUTOF10: 0
PAINLEVEL_OUTOF10: 5
PAINLEVEL_OUTOF10: 7
PAINLEVEL_OUTOF10: 6

## 2021-08-24 ASSESSMENT — PAIN DESCRIPTION - LOCATION: LOCATION: NECK;SHOULDER

## 2021-08-24 ASSESSMENT — PAIN DESCRIPTION - PAIN TYPE
TYPE: ACUTE PAIN;CHRONIC PAIN
TYPE: CHRONIC PAIN;ACUTE PAIN

## 2021-08-24 ASSESSMENT — PAIN DESCRIPTION - ORIENTATION
ORIENTATION: RIGHT;LEFT
ORIENTATION: RIGHT;LEFT

## 2021-08-24 NOTE — CARE COORDINATION
Call received from [facility]; confirmed chair time of MWF at 12:15 PM at [facility] location. Met with pt and provided update; pt acknowledged and agreeable. Dialysis information in [facility]. Spoke with Mari Samuel (admin at [facility]) provided cell # [phone]. Requested update be faxed to them upon discharge.

## 2021-08-24 NOTE — PROGRESS NOTES
Physical Therapy  [address]  Acute Rehabilitation Physical Therapy Progress Note    Date: 21  Patient Name: Abdoulaye Hirsch       Room: 0114/4183-45  MRN: [id number]   Account: [de-identified]   : 1960  (59 y.o.) Gender: female     Referring Practitioner: Frances Diop MD  Diagnosis: CVA  Past Medical History:  has a past medical history of Backache, unspecified, CHF (congestive heart failure) ([address] Chronic kidney disease, Coronary atherosclerosis of artery bypass graft, Cramp of limb, Gallstones, Hypertension, Insomnia, Pneumonia, Type II or unspecified type diabetes mellitus with renal manifestations, not stated as uncontrolled(250.40), Type II or unspecified type diabetes mellitus without mention of complication, not stated as uncontrolled, and Unspecified vitamin D deficiency. Past Surgical History:   has a past surgical history that includes Coronary artery bypass graft; Knee arthroscopy; Carpal tunnel release; Breast surgery; Tonsillectomy; Hand surgery; Ankle fracture surgery; Cholecystectomy, open (N/A); and IR TUNNELED CVC PLACE WO SQ PORT/PUMP > 5 YEARS (2021). Additional Pertinent Hx: Presented with acute right arm weakness and mental status change. MRI brain showed probable acute to subacute lacunar infarct of the left frontal lobe. AURELIA on CKD stage 4 likely d/t contrast induced nephropathy. Dialysis was initiated on 21, had 2 treatment session, labs improving. Pt. has history of diabetes mellitus type II, CAD s/p CABG (), cervical stenosis, back pain, lymphedema, diastolic CHF, DVT, urinary retention, and iron deficiency anemia. Pt. admitted to ARU from [place] [id number] 21.      Overall Orientation Status: Within Normal Limits  Restrictions/Precautions  Restrictions/Precautions: Fall Risk  Required Braces or Orthoses?: Yes (B lymphedema wraps. )  Required Braces or Orthoses  Right Lower Extremity Brace:  (Lymphedema brace )  Left Lower Extremity Brace:  (Lymphedema brace )    Subjective: Patient reporting \"feeling really off today. \"  States she had dialysis yesterday evening. Comments: Nursing reporting low BP. Vital Signs  Patient Currently in Pain: Denies  Pain Assessment: 0-10  Pain Level: 5  Pain Type: Chronic pain;Acute pain  Pain Location: Neck; Shoulder  Pain Orientation: Right;Left  Non-Pharmaceutical Pain Intervention(s): Repositioned; Rest  Response to Pain Intervention: Patient Satisfied                Bed Mobility:   Rolling: Modified independent  Supine to Sit: Modified independent  Sit to Supine: Modified independent  Scooting: Modified independent      Transfers:  Sit to Stand: Modified independent  Stand to sit: Modified independent  Bed to Chair: Modified independent              Ambulation 1  Surface: level tile  Device: Rollator  Assistance: Supervision (Low Blood Pressure today.)  Quality of Gait: Required seated break on rollator post 100 ft. No LOB  Gait Deviations: Slow Annie;Decreased step height;Decreased step length; Increased ELISABET  Distance: 100 ft x 2 AM and PM; short distances within gym. (Distance modified due to low BP today.)        Stairs/Curb  Stairs?: Yes  Stairs  # Steps : 15  Stairs Height:  (4\"/6\")  Rails: Bilateral  Device: No Device  Assistance: Supervision (Low BP reported today by nursing.)  Comment: Patient completed steps in a reciprocal pattern. BALANCE Posture: Good  Sitting - Static: Fair;+  Sitting - Dynamic: Fair;+  Standing - Static: Fair  Standing - Dynamic: Fair  Comments: Sitting balance assessed EOB, standing balance assessed with rolling walker    EXERCISES    Other exercises?: Yes  Other exercises 2: seated B LE exercises x20 reps #3 and purple tband   Other exercises 3: seated UBE 5 minutes FWD/BWD   Other exercises 4: NuStep L4 x10 minutes   Other exercises 5: stand pivot transfer from WC to NuStep x2 no device  Other Activities  Comment: rest breaks PRN.          Activity Tolerance: Patient Tolerated treatment well, Patient limited by endurance, Patient limited by fatigue  PT Equipment Recommendations  Equipment Needed: No           Current Treatment Recommendations: Strengthening, Balance Training, Transfer Training, Functional Mobility Training, ROM, Endurance Training, Gait Training, Stair training, Pain Management, Home Exercise Program, Safety Education & Training, Neuromuscular Re-education, Patient/Caregiver Education & Training, Equipment Evaluation, Education, & procurement    Conditions Requiring Skilled Therapeutic Intervention  Body structures, Functions, Activity limitations: Decreased functional mobility ; Decreased strength;Decreased endurance;Decreased balance; Increased pain  Treatment Diagnosis: CVA  REQUIRES PT FOLLOW UP: Yes  Discharge Recommendations: Patient would benefit from continued therapy after discharge;Home with assist PRN    Goals  Short term goals  Time Frame for Short term goals: 5 days   Short term goal 1: Pt. to perform bed mobility independently. Short term goal 2: Pt. to tolerate 30 to 45 minutes of therapeutic exercise to improve strength and endurance for increased functional mobility. Short term goal 3: Pt. to improve seated static and dynamic balance to good. Short term goal 4: Pt. to improve standing static balance to fair+ and standing dynamic balance to fair. Short term goal 5: Pt. to ambulate 200ft. with supervision using a rollator. Short term goal 6: Pt. to ascend/descend 3 steps with CGA using one railing  Long term goals  Time Frame for Long term goals : By DC  Long term goal 1: Pt. to perform all transfers independently or with Mod-I. Long term goal 2: Pt. to improve standing static and dynamic balance to good. Long term goal 3: Pt. to ambulate 100ft. on an unlevel/ inclined surface with Mod-I using a rollator. Long term goal 4: Pt. to ascend/descend one flight of stairs with supervision   Long term goal 5: Pt. to ambulate 200ft.  in 2 minutes with Mod-I using a rollator.     Long term goal 6: Pt. to improve PASS score from 24/36 to 35/36.        08/24/21 1045 08/24/21 1430   PT Individual Minutes   Time In 1045 1430   Time Out 1135 1515   Minutes 50 45       Electronically signed by Parmjit Chisholm PTA on 8/24/21 at 4:32 PM EDT

## 2021-08-24 NOTE — PROGRESS NOTES
[facility] Internal Medicine    CONSULTATION / HISTORY AND PHYSICAL EXAMINATION            Date:   8/24/2021  Patient name:  Andi To  Date of admission:  8/11/2021  5:47 PM  MRN:   [id number]  Account:  [de-identified]  YOB: 1960  PCP:    AFSANEH Payton CNP  Room:   3169/3214-79  Code Status:    Full Code    Physician Requesting Consult: Jason Strong MD    Reason for Consult:  medical management    Chief Complaint:     No chief complaint on file. Right upper and lower extremity weakness  History Obtained From:     Patient medical record nursing staff    History of Present Illness:   43-year-old  lady morbidly obese class III BMI 40.74 initially admitted to [facility] with right arm weakness and altered mental status she had an MRI done which showed acute lacunar infarct of the left frontal lobe medically managed history of chronic kidney disease had acute kidney injury required hemodialysis with right-sided Mauricio catheter in place in the jugular vein with improving renal function hemodialysis on hold    Noted to have some epistaxis this morning refusing to have anterior nasal packing done patient on anticoagulants  Multiple comorbid condition including hypertension hyperlipidemia coronary disease congestive heart failure diabetes mellitus    8/14   Patient is doing much better today  No new complaints    8/17   Plan for tunnel catheter  Blood sugars running high.   Past Medical History:     Past Medical History:   Diagnosis Date    Backache, unspecified     CHF (congestive heart failure) (HCC)     Chronic kidney disease     Coronary atherosclerosis of artery bypass graft     Cramp of limb     Gallstones     Hypertension     Insomnia     Pneumonia     Type II or unspecified type diabetes mellitus with renal manifestations, not stated as uncontrolled(250.40)     Type II or unspecified type diabetes mellitus without mention of complication, not stated as uncontrolled     Unspecified vitamin D deficiency         Past Surgical History:     Past Surgical History:   Procedure Laterality Date    ANKLE FRACTURE SURGERY      BREAST SURGERY      CARPAL TUNNEL RELEASE      CHOLECYSTECTOMY, OPEN N/A     CORONARY ARTERY BYPASS GRAFT      x3    HAND SURGERY      IR TUNNELED CATHETER PLACEMENT GREATER THAN 5 YEARS  8/18/2021    IR TUNNELED CATHETER PLACEMENT GREATER THAN 5 YEARS 8/18/2021 [facility]    KNEE ARTHROSCOPY      right    TONSILLECTOMY          Medications Prior to Admission:     Prior to Admission medications    Medication Sig Start Date End Date Taking? Authorizing Provider   busPIRone (BUSPAR) 7.5 MG tablet TAKE 1 TABLET BY MOUTH THREE TIMES DAILY 7/1/21   Historical Provider, MD   gabapentin (NEURONTIN) 300 MG capsule TAKE 1 CAPSULE BY MOUTH TWICE DAILY 6/21/21 7/23/21  AFSANEH Morgan CNP   ELIQUIS 5 MG TABS tablet  6/5/21   Historical Provider, MD   blood glucose test strips (DEN CONTOUR TEST) strip 1 each by In Vitro route 3 times daily As needed. 4/30/21   AFSANEH Morgan CNP   Dulaglutide (TRULICITY) 1.5 PX/7.1MK SOPN 1.5 mg    Historical Provider, MD   insulin glargine (BASAGLAR KWIKPEN) 100 UNIT/ML injection pen Basaglar KwikPen U-100 Insulin 100 unit/mL (3 mL) subcutaneous   Inject 43 units twice a day by subcutaneous route as directed for 90 days.     Historical Provider, MD   insulin lispro, 1 Unit Dial, (HUMALOG KWIKPEN) 100 UNIT/ML SOPN Humalog KwikPen (U-100) Insulin 100 unit/mL subcutaneous   15 units with each meal plus 2-10 units as SS if BS>150    Historical Provider, MD   Biotin w/ Vitamins C & E (HAIR/SKIN/NAILS PO) Take 200 mg by mouth daily    Historical Provider, MD   allopurinol (ZYLOPRIM) 100 MG tablet Take 100 mg by mouth daily    Historical Provider, MD   sharps container 1 each by Does not apply route as needed (dirty pen needles) 4/19/21   AFSANEH Morgan CNP ferrous sulfate (BRIAN-ARCHIE) 325 (65 Fe) MG tablet Take 1 tablet by mouth daily 4/19/21   AFSANEH Mejia CNP   allopurinol (ZYLOPRIM) 100 MG tablet Take 1 tablet by mouth daily 4/14/21   AFSANEH Mejia CNP   isosorbide mononitrate (IMDUR) 30 MG extended release tablet Take 1 tablet by mouth daily 4/9/21   Anju Burns MD   traZODone (DESYREL) 50 MG tablet Take 75 mg by mouth nightly    Historical Provider, MD   bumetanide (BUMEX) 2 MG tablet Take 1 tablet by mouth daily 3/30/21   AFSANEH Mejia CNP   atorvastatin (LIPITOR) 40 MG tablet Take 1 tablet by mouth daily 3/30/21   AFSANEH Mejia CNP   carvedilol (COREG) 6.25 MG tablet Take 1 tablet by mouth 2 times daily 3/30/21   AFSANEH Mejia CNP   ranolazine (RANEXA) 500 MG extended release tablet Take 1 tablet by mouth 2 times daily  Patient taking differently: Take 1,000 mg by mouth 2 times daily  3/30/21   AFSANEH Mejia CNP   nitroGLYCERIN (NITROSTAT) 0.4 MG SL tablet Place 1 tablet under the tongue every 5 minutes as needed for Chest pain. 3/29/15   AFSANEH Sargent CNP   Insulin Pen Needle (BD ULTRA-FINE PEN NEEDLES) 29G X 12.7MM MISC 1 each by Does not apply route 6 times daily. For use with each insulin 12/23/14   Jose Bear MD   docusate sodium (COLACE) 100 MG capsule Take 100 mg by mouth 2 times daily. Historical Provider, MD   Lancets 28G MISC Inject 1 each into the skin 3 times daily. 10/30/14   Jose Bear MD        Allergies:     Adhesive tape, Ace inhibitors, Iv dye [iodides], and Metformin and related    Social History:     Tobacco:    reports that she has never smoked. She has never used smokeless tobacco.  Alcohol:      reports no history of alcohol use. Drug Use:  reports no history of drug use.     Family History:     Family History   Problem Relation Age of Onset    Diabetes Father     Heart Failure Father        Review of Systems:     Positive and Negative as described in palpable  Lungs: Bilateral equal air entry, clear to ausculation, no wheezing, rales or rhonchi, normal effort  Cardiovascular: normal rate, regular rhythm, no murmur, gallop, rub. Abdomen: Soft, nontender, nondistended, normal bowel sounds, no hepatomegaly or splenomegaly  Neurologic: There are no new focal motor or sensory deficits, normal muscle tone and bulk, no abnormal sensation, normal speech, cranial nerves II through XII grossly intact  Positive for right upper and right lower extremity 4 out of 5 strength strength  Skin: No gross lesions, rashes, bruising or bleeding on exposed skin area  Extremities:  peripheral pulses palpable, no pedal edema or calf pain with palpation  Psych:      Investigations:      Laboratory Testing:  Recent Results (from the past 24 hour(s))   POC Glucose Fingerstick    Collection Time: 08/23/21  8:11 PM   Result Value Ref Range    POC Glucose 205 (H) 65 - 105 mg/dL   POC Glucose Fingerstick    Collection Time: 08/24/21  6:09 AM   Result Value Ref Range    POC Glucose 95 65 - 105 mg/dL           Consultations:   IP CONSULT TO DIETITIAN  IP CONSULT TO SOCIAL WORK  IP CONSULT TO INTERNAL MEDICINE  IP CONSULT TO NEPHROLOGY  Assessment :      Primary Problem  <principal problem not specified>    Active Hospital Problems    Diagnosis Date Noted    Acute cerebrovascular accident (CVA) ([facility] [I63.9] 08/11/2021    Chronic ischemic heart disease [I25.9] 07/23/2021    Anemia in stage 4 chronic kidney disease ([facility] [N18.4, D63.1] 05/03/2021    Essential hypertension [I10] 05/03/2021    History of coronary artery bypass graft [Z95.1] 03/31/2021    Diabetic polyneuropathy associated with type 2 diabetes mellitus ([facility] [E11.42] 02/17/2020    Chronic diastolic heart failure ([facility] [I50.32] 09/20/2018    Mixed hyperlipidemia [E78.2] 07/27/2016    Coronary artery disease [I25.10] 05/04/2015   Active Problems:    Coronary artery disease    Chronic diastolic heart failure (HCC)    Diabetic polyneuropathy associated with type 2 diabetes mellitus ([facility]    History of coronary artery bypass graft    Mixed hyperlipidemia    Essential hypertension    Anemia in stage 4 chronic kidney disease (HCC)    Chronic ischemic heart disease    Acute cerebrovascular accident (CVA) ([facility]  Resolved Problems:    * No resolved hospital problems. *        Plan:     26-year-old lady morbidly obese class III BMI 40.74 history of chronic kidney disease hypertension coronary disease chronic diastolic congestive heart failure  New diagnosis of acute lacunar infarct with right upper and lower extremity weakness  Acute on chronic kidney disease required hemodialysis with a Mauricio catheter  Diabetes mellitus with neuropathy Lantus sliding scale, some readings of high blood sugars, increasing Lantus to 53 units twice a day, monitoring closely  Gabapentin continue for diabetic neuropathy  Anemia of chronic kidney disease received IV iron  Coronary artery disease continue carvedilol and Ranexa  Chronic diastolic congestive heart failure compensated continue Lasix  Plan for hd per nephrologist        Precious Regalado MD  8/24/2021  3:43 PM    Copy sent to Dr. Pratik Dominguez, APRN - CNP    Please note that this chart was generated using voice recognition Dragon dictation software. Although every effort was made to ensure the accuracy of this automated transcription, some errors in transcription may have occurred.

## 2021-08-24 NOTE — PLAN OF CARE
Problem: Pain:  Goal: Pain level will decrease  Description: Pain level will decrease  8/24/2021 0504 by Emiliano Wilson RN  Outcome: Ongoing   Pain assessment and reassessment completed so far this shift. Pt. able to rest after the use of pain medication. Patient medicated with 2 tylenol Q4hrs prn fir c/o pain bilat feet. Pt. Repositions per self for comfort. Nonverbal cues indicate pain relief. Pt. Rests quietly with eyes closed after pain medication administration. Respirations easy and unlabored. Appears free from distress.

## 2021-08-24 NOTE — PROGRESS NOTES
NEPHROLOGY PROGRESS NOTE    Patient :  Abdoulaye Hirsch; 59 y.o. MRN# [id number]  Location:  2617/2617-01  Attending:  Frances Diop MD  Admit Date:  8/11/2021   Hospital Day: 15    Reason for consultation: Management of acute kidney injury superimposed on chronic kidney disease stage IV. Consulting physician: Kimmie Coto MD.    Interval history: Patient was seen and examined today and she does not have shortness of breath at rest. She feels dizzy and blood pressure is 87/47.-Had chest pain with dialysis yesterday. S/P right IJ tunneled catheter placed. Patient denies abdominal pain, nausea or vomiting. History of Present Illness: This is a 59 y.o. female with past medical history of longstanding type 2 diabetes insulin-dependent diabetes mellitus, essential hypertension, coronary artery disease status post CABG in 2004, coronary artery stent in 2019, CHF with EF of 55%, history of mild to moderate LVH diastolic dysfunction, CKD 4 with baseline creatinine of about 2.2 to 2.4 mg/dL, patient initially presented to [facility] on 8/1/2021 with right-sided weakness, sudden onset patient was diagnosed with acute/subacute stroke in left frontal lobe with right-sided weakness, patient had CT angiogram during that. And developed acute kidney injury due to contrast-induced nephropathy requiring dialysis serum creatinine peaked to 4.9 mg/dL and patient was started on dialysis on 6 August patient had dialysis on 7 August, and dialysis was held follow kidney function renal recovery and subsequently patient was transferred to acute rehab on 10 August.  Nephrology is consulted for ongoing management of  acute kidney injury on CKD. Patient is feeling better weakness slightly improved patient still have peripheral edema patient is on diuretics most recent serum creatinine is 2.9 mg/dL, BUN 57  Urine output is not measured.   During hospital stay at [facility] patient did have urinary retention and had Pantoja catheter which was removed 2 days ago. Denies any urinary complaints. Medication review showed use of  ARB's and diuretics.     Current Medications:    insulin glargine (LANTUS) injection vial 53 Units, BID  sodium citrate 4 % injection 1.3 mL, PRN  sodium citrate 4 % injection 1.3 mL, PRN  traZODone (DESYREL) tablet 100 mg, Nightly  sodium chloride (OCEAN, BABY AYR) 0.65 % nasal spray 1 spray, PRN  acetaminophen (TYLENOL) tablet 650 mg, Q4H PRN  polyethylene glycol (GLYCOLAX) packet 17 g, Daily  senna (SENOKOT) tablet 17.2 mg, Daily PRN  bisacodyl (DULCOLAX) suppository 10 mg, Daily PRN  febuxostat (ULORIC) tablet 40 mg, Daily  tamsulosin (FLOMAX) capsule 0.4 mg, Daily  gabapentin (NEURONTIN) capsule 300 mg, BID  furosemide (LASIX) tablet 80 mg, BID  pantoprazole (PROTONIX) tablet 40 mg, QAM AC  ranolazine (RANEXA) extended release tablet 1,000 mg, BID  insulin lispro (HUMALOG) injection vial 0-12 Units, TID WC  insulin lispro (HUMALOG) injection vial 0-6 Units, Nightly  glucose (GLUTOSE) 40 % oral gel 15 g, PRN  dextrose 50 % IV solution, PRN  glucagon (rDNA) injection 1 mg, PRN  dextrose 5 % solution, PRN  apixaban (ELIQUIS) tablet 5 mg, BID  aspirin chewable tablet 81 mg, Daily  atorvastatin (LIPITOR) tablet 80 mg, Nightly  busPIRone (BUSPAR) tablet 7.5 mg, TID  carvedilol (COREG) tablet 6.25 mg, BID WC  ferrous sulfate (IRON 325) tablet 325 mg, BID WC      Objective:  CURRENT TEMPERATURE:  Temp: 98.8 °F (37.1 °C)  MAXIMUM TEMPERATURE OVER 24HRS:  Temp (24hrs), Av °F (36.7 °C), Min:97.5 °F (36.4 °C), Max:98.8 °F (37.1 °C)    CURRENT RESPIRATORY RATE:  Resp: 14  CURRENT PULSE:  Pulse: 70  CURRENT BLOOD PRESSURE:  BP: (!) 87/47  24HR BLOOD PRESSURE RANGE:  Systolic (25DOM), SMY:128 , Min:87 , GNB:372   ; Diastolic (87BZY), IGN:60, Min:45, Max:83    24HR INTAKE/OUTPUT:    No intake or output data in the 24 hours ending 21 1302  Patient Vitals for the past 96 hrs (Last 3 readings):   Weight 08/23/21 1819 228 lb 2.8 oz (103.5 kg)   08/23/21 1437 232 lb 9.4 oz (105.5 kg)   08/20/21 1947 232 lb 9.4 oz (105.5 kg)     Physical Exam:  GENERAL APPEARANCE: Alert and cooperative, and appears to be in no acute distress. HEAD: normocephalic  CARDIAC: Normal S1 and S2. No S3, S4 or murmurs. Rhythm is regular. LUNGS: Clear to auscultation and percussion without rales, rhonchi, wheezing or diminished breath sounds. ABDOMEN: Soft with normal bowel sounds; no palpable organomegaly. MUSKULOSKELETAL: Adequately aligned spine. No joint erythema or tenderness. EXTREMITIES: 2+ bilateral lower extremity edema. Peripheral pulses intact. NEURO: Right-sided weakness    Labs:    BMP:   Recent Labs     08/23/21  0639      K 4.2      CO2 29   BUN 41*   CREATININE 3.20*   GLUCOSE 152*   CALCIUM 9.4      Assessment/plan:    1. Acute kidney injury superimposed on chronic kidney disease stage IV differentials include contrast mediated acute tubular necrosis secondary to recent CT angiogram at [facility] and progression of underlying chronic kidney disease. She started acute hemodialysis at [facility] on 8/6/2021. Patient has ongoing indications for intermittent hemodialysis. Plan  Continue  Monday/wedensday/friday schedule  S/P tunneled hemodialysis catheter placed on 8/18/21  Continue to monitor GFR and urine output closely for evidence of renal function. Basic metabolic profile daily. 2.  Systemic hypertension - blood pressure control is adequate. 3.  S/p recent left frontal ischemic stroke with right-sided weakness. Continue physical therapy exercises. 4.  Peripheral edema - continue furosemide 80 mg p.o. twice daily. Prognosis is guarded.     Electronically signed by Carla Moran MD on 8/24/2021 at 1:02 PM

## 2021-08-24 NOTE — PLAN OF CARE
Problem: Falls - Risk of:  Goal: Will remain free from falls  Description: Will remain free from falls  Outcome: Met This Shift   No falls or injuries sustained at this time. No attempts to get out of bed without nursing assistance. Call light within reach and pt. uses appropriately for assistance. Siderails up x 2. Nonskid footwear remains on. Bed in low and locked position. Hourly nursing rounds made. Pt. Alert and oriented, aware of limitations, and exhibits good safety judgement. Pt. uses assistive devices appropriately. Pt. understands individual fall risk factors.     Pt. reminded to use call light with each nurse/patient interaction.     Pt. room located close to nurse's station.      Bed alarm remains engaged throughout the shift as a precaution.          Problem: Skin Integrity:  Goal: Absence of new skin breakdown  Description: Absence of new skin breakdown  Outcome: Met This Shift   Skin assessment completed this shift. Nutrition and Hydration status assessed with adequate intake. Rashel Score as charted. Chair cushion intact for when pt is up to chair. Bilateral heels remain elevated on pillows throughout the shift. Patient able to reposition self for comfort and to prevent breakdown. Patient verbalizes understanding of pressure ulcer prevention measures. Skin integrity maintained. No new skin breakdown noted. Skin to high risk pressure areas including coccyx and heels are clear. Elena care provided as needed throughout the shift per pt. Abrasion noted to RLE (calf) Mepilex C,D,I  No S/S of infection noted.  Will continue to monitor.

## 2021-08-24 NOTE — PROGRESS NOTES
Physical Medicine & Rehabilitation  Progress Note      Subjective:      Rozina Rogers is a 59 y.o. female with ischemic CVA. She reports doing well today. She notes ongoing intermittent tremors/spasms in the right upper limb, which are unchanged. She also states that she feels fatigued after dialysis yesterday. She denies any other acute concerns. ROS:  Denies fevers, chills, sweats. No chest pain, palpitations, lightheadedness. Denies coughing, wheezing or shortness of breath. Denies abdominal pain, nausea, diarrhea or constipation. No new areas of joint pain. Denies new areas of numbness or weakness. Denies new anxiety or depression issues. No new skin problems. Rehabilitation:   Progressing in therapies. PT:  Restrictions/Precautions: Fall Risk  Required Braces or Orthoses  Right Lower Extremity Brace:  (Lymphedema brace )  Left Lower Extremity Brace:  (Lymphedema brace )   Transfers  Sit to Stand: Modified independent  Stand to sit: Modified independent  Bed to Chair: Modified independent  Stand Pivot Transfers: Modified independent  Lateral Transfers: Contact guard assistance  Comment: Transfers assessed with rollator. Steady, no LOB noted  Ambulation 1  Surface: level tile  Device: Rollator  Assistance: Supervision (Low Blood Pressure today.)  Quality of Gait: Required seated break on rollator post 100 ft. No LOB  Gait Deviations: Slow Annie, Decreased step height, Decreased step length, Increased ELISABET  Distance: 100 ft x 2 AM and PM; short distances within gym. (Distance modified due to low BP today.)  Comments: patient ambulated to and from therapy gym with 1 seated rest break during distances     Transfers  Sit to Stand: Modified independent  Stand to sit: Modified independent  Bed to Chair: Modified independent  Stand Pivot Transfers: Modified independent  Lateral Transfers: Contact guard assistance  Comment: Transfers assessed with rollator.  Steady, no LOB noted  Ambulation  Ambulation?: Yes  Ambulation 1  Surface: level tile  Device: Rollator  Assistance: Supervision (Low Blood Pressure today.)  Quality of Gait: Required seated break on rollator post 100 ft. No LOB  Gait Deviations: Slow Annie, Decreased step height, Decreased step length, Increased ELISABET  Distance: 100 ft x 2 AM and PM; short distances within gym. (Distance modified due to low BP today.)  Comments: patient ambulated to and from therapy gym with 1 seated rest break during distances     Surface: level tile  Ambulation 1  Surface: level tile  Device: Rollator  Assistance: Supervision (Low Blood Pressure today.)  Quality of Gait: Required seated break on rollator post 100 ft. No LOB  Gait Deviations: Slow Annie, Decreased step height, Decreased step length, Increased ELISABET  Distance: 100 ft x 2 AM and PM; short distances within gym. (Distance modified due to low BP today.)  Comments: patient ambulated to and from therapy gym with 1 seated rest break during distances     OT:  ADL  Equipment Provided: Reacher, Sock aid, Long-handled sponge  Feeding: Modified independent   Grooming: Modified independent  (w/c level )  UE Bathing: None (pt declines due to fatigue. )  LE Bathing: None (pt declines due to fatigue. )  UE Dressing: Modified independent  (set up/completion at w/c level )  LE Dressing: Minimal assistance (Assist to maria de jesus compression socks/sleeves. )  Toileting: Supervision  Additional Comments: Pt able to maria de jesus B compression sleeves mod I this date; using reacher to initiate/thread. Pt has compression brandon at home will increase ease and assist of donning. Pt also states her sister will be over in morning to assist her mother with breakfast, she could help maria de jesus them if needed.     Instrumental ADL's  Instrumental ADLs: Yes     Balance  Sitting Balance: Modified independent   Standing Balance: Supervision (short stands for functional occurence. )   Standing Balance  Time: <1 min x 2   Activity: transfers, LB dressing. Comment: with RW. no LOB noted. Functional Mobility  Functional - Mobility Device: Wheelchair  Activity: Retrieve items, Transport items, To/from bathroom  Assist Level: Modified independent   Functional Mobility Comments: pt requested use of wheelchair this date due to increased fatigue. Bed mobility  Bridging: Stand by assistance  Rolling to Left: Modified independent  Rolling to Right: Modified independent  Supine to Sit: Modified independent  Sit to Supine: Modified independent  Scooting: Modified independent  Comment: HOB slightly elevated  Transfers  Sit to stand: Supervision  Stand to sit: Supervision  Transfer Comments: Verbal cues for hand placement and safety   Toilet Transfers  Toilet - Technique: Stand pivot  Equipment Used: Raised toilet seat with rails  Toilet Transfer: Supervision  Toilet Transfers Comments: grab rail      Shower Transfers  Shower - Transfer From: Diamond [place] - Transfer Type: To and From  Shower - Transfer To: Transfer tub bench  Shower - Technique: Ambulating  Shower Transfers: Stand by assistance  Shower Transfers Comments: Increased time with verbal cues for safety over threshold.         SPEECH:  None today      Current Medications:   Current Facility-Administered Medications: insulin glargine (LANTUS) injection vial 53 Units, 53 Units, Subcutaneous, BID  sodium citrate 4 % injection 1.3 mL, 1.3 mL, Intracatheter, PRN  sodium citrate 4 % injection 1.3 mL, 1.3 mL, Intracatheter, PRN  traZODone (DESYREL) tablet 100 mg, 100 mg, Oral, Nightly  sodium chloride (OCEAN, BABY AYR) 0.65 % nasal spray 1 spray, 1 spray, Each Nostril, PRN  acetaminophen (TYLENOL) tablet 650 mg, 650 mg, Oral, Q4H PRN  polyethylene glycol (GLYCOLAX) packet 17 g, 17 g, Oral, Daily  senna (SENOKOT) tablet 17.2 mg, 2 tablet, Oral, Daily PRN  bisacodyl (DULCOLAX) suppository 10 mg, 10 mg, Rectal, Daily PRN  febuxostat (ULORIC) tablet 40 mg, 40 mg, Oral, Daily  tamsulosin (FLOMAX) capsule 0.4 mg, 0.4 mg, Oral, Daily  gabapentin (NEURONTIN) capsule 300 mg, 300 mg, Oral, BID  furosemide (LASIX) tablet 80 mg, 80 mg, Oral, BID  pantoprazole (PROTONIX) tablet 40 mg, 40 mg, Oral, QAM AC  ranolazine (RANEXA) extended release tablet 1,000 mg, 1,000 mg, Oral, BID  insulin lispro (HUMALOG) injection vial 0-12 Units, 0-12 Units, Subcutaneous, TID WC  insulin lispro (HUMALOG) injection vial 0-6 Units, 0-6 Units, Subcutaneous, Nightly  glucose (GLUTOSE) 40 % oral gel 15 g, 15 g, Oral, PRN  dextrose 50 % IV solution, 12.5 g, IntraVENous, PRN  glucagon (rDNA) injection 1 mg, 1 mg, Intramuscular, PRN  dextrose 5 % solution, 100 mL/hr, IntraVENous, PRN  apixaban (ELIQUIS) tablet 5 mg, 5 mg, Oral, BID  aspirin chewable tablet 81 mg, 81 mg, Oral, Daily  atorvastatin (LIPITOR) tablet 80 mg, 80 mg, Oral, Nightly  busPIRone (BUSPAR) tablet 7.5 mg, 7.5 mg, Oral, TID  carvedilol (COREG) tablet 6.25 mg, 6.25 mg, Oral, BID WC  ferrous sulfate (IRON 325) tablet 325 mg, 325 mg, Oral, BID WC      Objective:  BP (!) 133/57   Pulse 72   Temp 97.5 °F (36.4 °C) (Oral)   Resp 16   Ht 5' 5\" (1.651 m)   Wt 228 lb 2.8 oz (103.5 kg)   SpO2 96%   BMI 37.97 kg/m²       GEN: Well developed, well nourished, no acute distress  HEENT: NCAT. EOM grossly intact. Hearing grossly intact. Mucous membranes pink and moist.  RESP: Normal breath sounds with no wheezing, rales, or rhonchi. Respirations WNL and unlabored. CV: Regular rate and rhythm. No murmurs, rubs, or gallops. ABD: Soft, non-distended, BS+ and equal.  NEURO: Alert. Speech fluent. MSK:  Muscle bulk is normal bilaterally. Moving bilateral upper limbs with at least antigravity strength. Strength 4+/5 in bilateral lower limbs. No tremors noted in right upper limb. LIMBS:  Edema present in bilateral lower limbs - stable (has lymphedema wraps in place). SKIN: Warm and dry with good turgor. PSYCH: Mood WNL. Affect WNL. Appropriately interactive.     Diagnostics: CBC: No results for input(s): WBC, RBC, HGB, HCT, MCV, RDW, PLT in the last 72 hours. BMP:   Recent Labs     08/23/21  0639      K 4.2      CO2 29   BUN 41*   CREATININE 3.20*   GLUCOSE 152*     BNP: No results for input(s): BNP in the last 72 hours. PT/INR: No results for input(s): PROTIME, INR in the last 72 hours. APTT: No results for input(s): APTT in the last 72 hours. CARDIAC ENZYMES: No results for input(s): CKMB, CKMBINDEX, TROPONINT in the last 72 hours. Invalid input(s): CKTOTAL;3  FASTING LIPID PANEL:  Lab Results   Component Value Date    CHOL 105 04/09/2015    HDL 43 04/09/2015    TRIG 168 04/09/2015     LIVER PROFILE: No results for input(s): AST, ALT, ALB, BILIDIR, BILITOT, ALKPHOS in the last 72 hours. Impression/Plan:   Impaired ADLs, gait, and mobility due to:    1. Ischemic CVA L frontal lobe with mild R dominant hemiparesis:  PT/OT for gait, mobility, strengthening, endurance, ADLs, and self care. On ASA, atorvastatin  2. Right wrist/hand tremors/spasms:  Improving overall per patient. May be related to fatigue. No spasticity noted on exam.  Will continue to monitor. 3. HTN/CAD/Angina/Diastolic CHF: on carvedilol, furosemide, Ranexa  4. Insomnia: on Trazodone - was taking 75 mg at home - increased to 100 mg on 8/12. Sleep improved. 5. Anxiety: on buspirone  6. DM with neuropathy: on lantus, sliding scale, gabapentin  7. AURELIA on CKD IV: Initiated on HD at [facility]. Nephrology following - continuing lasix at 80 mg BID. For HD MWF. IT placed tunneled cath 8/18.  8. Anemia of chronic disease: on ferrous sulfate. Had IV iron at [facility]. Hb low but stable. Monitoring routinely  9. Chronic neck and back pain: pain stable. Has prn Tylenol. Ice prn. 10. Urine retention: on flomax  11. Bowel Management: Miralax daily, senokot prn, dulcolax prn.   12. DVT Prophylaxis:  SCD's while in bed, AGUSTIN's and Eliquis  13.  Internal Medicine for medical

## 2021-08-24 NOTE — PROGRESS NOTES
[address] [facility]    ACUTE REHABILITATION OCCUPATIONAL THERAPY  DAILY NOTE    Date: 21  Patient Name: Rozina Rogers      Room: 2799/1887-63    MRN: [id number]   : 1960  (59 y.o.)  Gender: female   Referring Practitioner: Nannette Orozco MD  Diagnosis: CVA  Additional Pertinent Hx: Presented with acute right arm weakness and mental status change. MRI brain showed probable acute to subacute lacunar infarct of the left frontal lobe. AURELIA on CKD stage 4 likely d/t contrast induced nephropathy. Dialysis was initiated on 21, had 2 treatment session, labs improving. Pt. has history of diabetes mellitus type II, CAD s/p CABG (), cervical stenosis, back pain, lymphedema, diastolic CHF, DVT, urinary retention, and iron deficiency anemia. Pt. admitted to ARU from [place] [address] 21. Restrictions  Restrictions/Precautions: Fall Risk  Required Braces or Orthoses  Right Lower Extremity Brace:  (Lymphedema brace )  Left Lower Extremity Brace:  (Lymphedema brace )  Required Braces or Orthoses?: Yes (B lymphedema wraps)    Subjective  Comments: Pt very fatigued this date with increased pain and soreness. Pt pleasant and cooperative to tolerance. Patient Currently in Pain: Yes  Pain Level: 7  Pain Location: Neck; Shoulder;Back; Foot  Pain Orientation: Right;Left  Restrictions/Precautions: Fall Risk  Overall Orientation Status: Within Functional Limits     Pain Assessment  Pain Assessment: 0-10  Pain Level: 7  Pain Type: Acute pain, Chronic pain  Pain Location: Neck, Shoulder, Back, Foot  Pain Orientation: Right, Left    Objective  Cognition  Overall Cognitive Status: Impaired  Sequencing and Organization: Assistance required to generate solutions  Additional Comments: slower processing with tasks  Perception  Overall Perceptual Status: WFL  Balance  Sitting Balance: Modified independent   Standing Balance: Supervision (short stands for functional occurence. )  Transfers  Sit to stand: Supervision  Stand to sit: Supervision  Standing Balance  Time: <1 min x 2   Activity: transfers, LB dressing. Functional Mobility  Functional - Mobility Device: Wheelchair  Activity: Retrieve items;Transport items; To/from bathroom  Assist Level: Modified independent   Functional Mobility Comments: pt requested use of wheelchair this date due to increased fatigue. Toilet Transfers  Toilet - Technique: Stand pivot  Equipment Used: Raised toilet seat with rails  Toilet Transfer: Supervision     Type of ROM/Therapeutic Exercise  Type of ROM/Therapeutic Exercise: Free weights (RUE: 2# LUE: 3# x20 reps. )  Comment: Ex's with continuing improved tolerance from previous date, weight tolerated on R side this date although lighter than L side due to some continued soreness from port placement. Pt motivated to complete ex's to tolerance for increased strength and overall conditioning for functional task completion. Exercises  Scapular Protraction: x  Scapular Retraction: x  Shoulder Flexion: x  Shoulder Extension: x  Horizontal ABduction: x  Horizontal ADduction: x  Elbow Flexion: x  Elbow Extension: x  Wrist Flexion: x  Wrist Extension: x     Additional Activities: Other  Additional Activities: Pt engaged in ADL boards at tabletop to address B hand coordination/manipulation skills. Pt requires increased time for all boards. Pt rates from most difficult to easiest; milo, zipper, lacing/tying and then buttons. ADL  Feeding: Modified independent   Grooming: Modified independent  (w/c level )  UE Bathing: None (pt declines due to fatigue. )  LE Bathing: None (pt declines due to fatigue. )  UE Dressing: Modified independent  (set up/completion at w/c level )  LE Dressing: Minimal assistance (Assist to maria de jesus compression socks/sleeves. )  Toileting: Supervision        Assessment  Performance deficits / Impairments: Decreased ADL status; Decreased functional mobility ; Decreased strength;Decreased safe awareness;Decreased cognition;Decreased endurance;Decreased balance;Decreased high-level IADLs;Decreased coordination  Prognosis: Good  Discharge Recommendations: Patient would benefit from continued therapy after discharge;Home with assist PRN  Activity Tolerance: Patient Tolerated treatment well  Safety Devices in place: Yes  Type of devices: Left in chair;Call light within reach; All fall risk precautions in place  Equipment Recommendations  Equipment Needed: Yes  Reacher: x  Sock Aid: x        Plan  Plan  Times per week: 5-7  Times per day: Twice a day  Current Treatment Recommendations: Self-Care / ADL, Strengthening, ROM, Balance Training, Functional Mobility Training, Endurance Training, Neuromuscular Re-education, Cognitive Reorientation, Pain Management, Safety Education & Training, Patient/Caregiver Education & Training, Equipment Evaluation, Education, & procurement, Home Management Training, Cognitive/Perceptual Training  Patient Goals   Patient goals : \"I would like to have energy and balance and to just be able to walk around and go to the store and walk around. \"   Short term goals  Time Frame for Short term goals: By 5-6 days  Short term goal 1: Pt will complete lower body dressing with CGA and good safety  Short term goal 2: Pt will complete functional trasnfers/mobility during self care tasks with supervision and good safety with use of least restrictive device  Short term goal 3: Pt will tolerate standing 5+ minutes during functional activity of choice with good safety   Short term goal 4: Pt will verbalize/demonstrate good understanding of energy conservation techniques to increase safety and independence with self care tasks  Short term goal 5: Pt will participate in 15+ minutes of therapeutic exercises/functional activities to increase safety and independence with self care tasks.    Long term goals  Time Frame for Long term goals : By discharge  Long term goal 1: Pt will complete BADLs with

## 2021-08-25 LAB
ANION GAP SERPL CALCULATED.3IONS-SCNC: 12 MMOL/L (ref 9–17)
BUN BLDV-MCNC: 35 MG/DL (ref 8–23)
BUN/CREAT BLD: ABNORMAL (ref 9–20)
CALCIUM SERPL-MCNC: 9.5 MG/DL (ref 8.6–10.4)
CHLORIDE BLD-SCNC: 99 MMOL/L (ref 98–107)
CO2: 27 MMOL/L (ref 20–31)
CREAT SERPL-MCNC: 3.79 MG/DL (ref 0.5–0.9)
GFR AFRICAN AMERICAN: 15 ML/MIN
GFR NON-AFRICAN AMERICAN: 12 ML/MIN
GFR SERPL CREATININE-BSD FRML MDRD: ABNORMAL ML/MIN/{1.73_M2}
GFR SERPL CREATININE-BSD FRML MDRD: ABNORMAL ML/MIN/{1.73_M2}
GLUCOSE BLD-MCNC: 113 MG/DL (ref 70–99)
GLUCOSE BLD-MCNC: 156 MG/DL (ref 65–105)
GLUCOSE BLD-MCNC: 273 MG/DL (ref 65–105)
GLUCOSE BLD-MCNC: 94 MG/DL (ref 65–105)
GLUCOSE BLD-MCNC: 95 MG/DL (ref 65–105)
HCT VFR BLD CALC: 33.3 % (ref 36–46)
HEMOGLOBIN: 10.8 G/DL (ref 12–16)
MCH RBC QN AUTO: 31.8 PG (ref 26–34)
MCHC RBC AUTO-ENTMCNC: 32.6 G/DL (ref 31–37)
MCV RBC AUTO: 97.5 FL (ref 80–100)
NRBC AUTOMATED: ABNORMAL PER 100 WBC
PDW BLD-RTO: 16.6 % (ref 11.5–14.9)
PLATELET # BLD: 207 K/UL (ref 150–450)
PMV BLD AUTO: 7.8 FL (ref 6–12)
POTASSIUM SERPL-SCNC: 4.2 MMOL/L (ref 3.7–5.3)
RBC # BLD: 3.41 M/UL (ref 4–5.2)
SODIUM BLD-SCNC: 138 MMOL/L (ref 135–144)
WBC # BLD: 4.4 K/UL (ref 3.5–11)

## 2021-08-25 PROCEDURE — 80048 BASIC METABOLIC PNL TOTAL CA: CPT

## 2021-08-25 PROCEDURE — 6370000000 HC RX 637 (ALT 250 FOR IP): Performed by: PHYSICAL MEDICINE & REHABILITATION

## 2021-08-25 PROCEDURE — 99231 SBSQ HOSP IP/OBS SF/LOW 25: CPT | Performed by: STUDENT IN AN ORGANIZED HEALTH CARE EDUCATION/TRAINING PROGRAM

## 2021-08-25 PROCEDURE — 97110 THERAPEUTIC EXERCISES: CPT

## 2021-08-25 PROCEDURE — 82947 ASSAY GLUCOSE BLOOD QUANT: CPT

## 2021-08-25 PROCEDURE — 97530 THERAPEUTIC ACTIVITIES: CPT

## 2021-08-25 PROCEDURE — 97130 THER IVNTJ EA ADDL 15 MIN: CPT

## 2021-08-25 PROCEDURE — 90935 HEMODIALYSIS ONE EVALUATION: CPT

## 2021-08-25 PROCEDURE — 36415 COLL VENOUS BLD VENIPUNCTURE: CPT

## 2021-08-25 PROCEDURE — 97129 THER IVNTJ 1ST 15 MIN: CPT

## 2021-08-25 PROCEDURE — 97116 GAIT TRAINING THERAPY: CPT

## 2021-08-25 PROCEDURE — 1180000000 HC REHAB R&B

## 2021-08-25 PROCEDURE — 99232 SBSQ HOSP IP/OBS MODERATE 35: CPT | Performed by: INTERNAL MEDICINE

## 2021-08-25 PROCEDURE — 2500000003 HC RX 250 WO HCPCS: Performed by: INTERNAL MEDICINE

## 2021-08-25 PROCEDURE — 6370000000 HC RX 637 (ALT 250 FOR IP): Performed by: INTERNAL MEDICINE

## 2021-08-25 PROCEDURE — 97535 SELF CARE MNGMENT TRAINING: CPT

## 2021-08-25 PROCEDURE — 85027 COMPLETE CBC AUTOMATED: CPT

## 2021-08-25 RX ORDER — SODIUM CITRATE 4 % (5 ML)
5 SYRINGE (ML) MISCELLANEOUS ONCE
Status: DISCONTINUED | OUTPATIENT
Start: 2021-08-25 | End: 2021-08-25

## 2021-08-25 RX ORDER — RANOLAZINE 1000 MG/1
1000 TABLET, EXTENDED RELEASE ORAL 2 TIMES DAILY
Qty: 60 TABLET | Refills: 0 | Status: ON HOLD | OUTPATIENT
Start: 2021-08-25 | End: 2021-09-25 | Stop reason: SDUPTHER

## 2021-08-25 RX ORDER — PANTOPRAZOLE SODIUM 40 MG/1
40 TABLET, DELAYED RELEASE ORAL
Qty: 30 TABLET | Refills: 0 | Status: SHIPPED | OUTPATIENT
Start: 2021-08-26 | End: 2021-09-07

## 2021-08-25 RX ORDER — ASPIRIN 81 MG/1
81 TABLET, CHEWABLE ORAL DAILY
Qty: 30 TABLET | Refills: 0 | Status: ON HOLD | OUTPATIENT
Start: 2021-08-26 | End: 2021-09-25 | Stop reason: SDUPTHER

## 2021-08-25 RX ORDER — FUROSEMIDE 80 MG
80 TABLET ORAL 2 TIMES DAILY
Qty: 60 TABLET | Refills: 0 | Status: ON HOLD | OUTPATIENT
Start: 2021-08-26 | End: 2021-09-25 | Stop reason: SDUPTHER

## 2021-08-25 RX ORDER — POLYETHYLENE GLYCOL 3350 17 G/17G
17 POWDER, FOR SOLUTION ORAL DAILY PRN
COMMUNITY
Start: 2021-08-25 | End: 2021-11-04

## 2021-08-25 RX ORDER — INSULIN GLARGINE 100 [IU]/ML
53 INJECTION, SOLUTION SUBCUTANEOUS 2 TIMES DAILY
Qty: 10 PEN | Refills: 0 | Status: ON HOLD | OUTPATIENT
Start: 2021-08-25 | End: 2021-09-25 | Stop reason: SDUPTHER

## 2021-08-25 RX ORDER — TRAZODONE HYDROCHLORIDE 100 MG/1
100 TABLET ORAL NIGHTLY
Qty: 30 TABLET | Refills: 0 | Status: SHIPPED | OUTPATIENT
Start: 2021-08-25 | End: 2021-09-13 | Stop reason: DRUGHIGH

## 2021-08-25 RX ORDER — ACETAMINOPHEN 325 MG/1
650 TABLET ORAL EVERY 4 HOURS PRN
Status: ON HOLD | COMMUNITY
Start: 2021-08-25 | End: 2021-11-22 | Stop reason: HOSPADM

## 2021-08-25 RX ORDER — CARVEDILOL 6.25 MG/1
6.25 TABLET ORAL 2 TIMES DAILY
Qty: 60 TABLET | Refills: 0 | Status: ON HOLD | OUTPATIENT
Start: 2021-08-25 | End: 2021-09-25 | Stop reason: SDUPTHER

## 2021-08-25 RX ORDER — APIXABAN 5 MG/1
5 TABLET, FILM COATED ORAL 2 TIMES DAILY
Qty: 60 TABLET | Refills: 0 | Status: ON HOLD | OUTPATIENT
Start: 2021-08-25 | End: 2021-09-25 | Stop reason: SDUPTHER

## 2021-08-25 RX ORDER — FERROUS SULFATE 325(65) MG
325 TABLET ORAL 2 TIMES DAILY WITH MEALS
Status: ON HOLD | COMMUNITY
Start: 2021-08-25 | End: 2021-11-22 | Stop reason: SDUPTHER

## 2021-08-25 RX ORDER — ATORVASTATIN CALCIUM 80 MG/1
80 TABLET, FILM COATED ORAL DAILY
Qty: 30 TABLET | Refills: 0 | Status: ON HOLD | OUTPATIENT
Start: 2021-08-25 | End: 2021-09-25 | Stop reason: SDUPTHER

## 2021-08-25 RX ORDER — GLUCOSAMINE HCL/CHONDROITIN SU 500-400 MG
CAPSULE ORAL
Qty: 240 EACH | Refills: 0 | Status: SHIPPED | OUTPATIENT
Start: 2021-08-25 | End: 2021-11-23

## 2021-08-25 RX ORDER — TAMSULOSIN HYDROCHLORIDE 0.4 MG/1
0.4 CAPSULE ORAL DAILY
Qty: 30 CAPSULE | Refills: 0 | Status: ON HOLD | OUTPATIENT
Start: 2021-08-26 | End: 2021-09-25 | Stop reason: SDUPTHER

## 2021-08-25 RX ORDER — GABAPENTIN 300 MG/1
300 CAPSULE ORAL 2 TIMES DAILY
Qty: 60 CAPSULE | Refills: 0 | Status: ON HOLD | OUTPATIENT
Start: 2021-08-25 | End: 2021-09-25

## 2021-08-25 RX ORDER — FEBUXOSTAT 40 MG/1
40 TABLET, FILM COATED ORAL DAILY
Qty: 30 TABLET | Refills: 0 | Status: ON HOLD | OUTPATIENT
Start: 2021-08-26 | End: 2021-09-25 | Stop reason: SDUPTHER

## 2021-08-25 RX ORDER — PEN NEEDLE, DIABETIC 29 G X1/2"
NEEDLE, DISPOSABLE MISCELLANEOUS
Qty: 200 EACH | Refills: 0 | Status: ON HOLD | OUTPATIENT
Start: 2021-08-25 | End: 2021-09-25 | Stop reason: SDUPTHER

## 2021-08-25 RX ORDER — SENNA PLUS 8.6 MG/1
2 TABLET ORAL DAILY PRN
COMMUNITY
Start: 2021-08-25 | End: 2022-03-01

## 2021-08-25 RX ORDER — BUSPIRONE HYDROCHLORIDE 7.5 MG/1
7.5 TABLET ORAL 3 TIMES DAILY
Qty: 90 TABLET | Refills: 0 | Status: ON HOLD | OUTPATIENT
Start: 2021-08-25 | End: 2021-09-25 | Stop reason: SDUPTHER

## 2021-08-25 RX ORDER — INSULIN ASPART 100 [IU]/ML
INJECTION, SOLUTION INTRAVENOUS; SUBCUTANEOUS
Qty: 4 PEN | Refills: 0 | Status: SHIPPED | OUTPATIENT
Start: 2021-08-25 | End: 2021-09-13

## 2021-08-25 RX ADMIN — POLYETHYLENE GLYCOL 3350 17 G: 17 POWDER, FOR SOLUTION ORAL at 07:43

## 2021-08-25 RX ADMIN — CARVEDILOL 6.25 MG: 6.25 TABLET, FILM COATED ORAL at 18:48

## 2021-08-25 RX ADMIN — BUSPIRONE HYDROCHLORIDE 7.5 MG: 7.5 TABLET ORAL at 13:39

## 2021-08-25 RX ADMIN — ATORVASTATIN CALCIUM 80 MG: 80 TABLET, FILM COATED ORAL at 20:20

## 2021-08-25 RX ADMIN — ASPIRIN 81 MG: 81 TABLET, CHEWABLE ORAL at 07:41

## 2021-08-25 RX ADMIN — GABAPENTIN 300 MG: 300 CAPSULE ORAL at 20:20

## 2021-08-25 RX ADMIN — CARVEDILOL 6.25 MG: 6.25 TABLET, FILM COATED ORAL at 07:42

## 2021-08-25 RX ADMIN — TAMSULOSIN HYDROCHLORIDE 0.4 MG: 0.4 CAPSULE ORAL at 07:42

## 2021-08-25 RX ADMIN — FUROSEMIDE 80 MG: 40 TABLET ORAL at 07:41

## 2021-08-25 RX ADMIN — INSULIN GLARGINE 53 UNITS: 100 INJECTION, SOLUTION SUBCUTANEOUS at 07:45

## 2021-08-25 RX ADMIN — INSULIN LISPRO 1 UNITS: 100 INJECTION, SOLUTION INTRAVENOUS; SUBCUTANEOUS at 20:24

## 2021-08-25 RX ADMIN — TRAZODONE HYDROCHLORIDE 100 MG: 100 TABLET ORAL at 20:21

## 2021-08-25 RX ADMIN — BUSPIRONE HYDROCHLORIDE 7.5 MG: 7.5 TABLET ORAL at 20:21

## 2021-08-25 RX ADMIN — APIXABAN 5 MG: 5 TABLET, FILM COATED ORAL at 07:42

## 2021-08-25 RX ADMIN — BUSPIRONE HYDROCHLORIDE 7.5 MG: 7.5 TABLET ORAL at 07:43

## 2021-08-25 RX ADMIN — Medication 1.3 ML: at 18:10

## 2021-08-25 RX ADMIN — APIXABAN 5 MG: 5 TABLET, FILM COATED ORAL at 20:23

## 2021-08-25 RX ADMIN — Medication 1.3 ML: at 18:09

## 2021-08-25 RX ADMIN — INSULIN GLARGINE 53 UNITS: 100 INJECTION, SOLUTION SUBCUTANEOUS at 20:24

## 2021-08-25 RX ADMIN — RANOLAZINE 1000 MG: 1000 TABLET, FILM COATED, EXTENDED RELEASE ORAL at 07:43

## 2021-08-25 RX ADMIN — RANOLAZINE 1000 MG: 1000 TABLET, FILM COATED, EXTENDED RELEASE ORAL at 20:22

## 2021-08-25 RX ADMIN — GABAPENTIN 300 MG: 300 CAPSULE ORAL at 07:41

## 2021-08-25 RX ADMIN — FUROSEMIDE 80 MG: 40 TABLET ORAL at 18:49

## 2021-08-25 RX ADMIN — PANTOPRAZOLE SODIUM 40 MG: 40 TABLET, DELAYED RELEASE ORAL at 06:07

## 2021-08-25 RX ADMIN — FEBUXOSTAT 40 MG: 40 TABLET, FILM COATED ORAL at 07:44

## 2021-08-25 RX ADMIN — INSULIN LISPRO 6 UNITS: 100 INJECTION, SOLUTION INTRAVENOUS; SUBCUTANEOUS at 12:11

## 2021-08-25 ASSESSMENT — 9 HOLE PEG TEST
TEST_RESULT: FUNCTIONAL
TESTTIME_SECONDS: 25.41
TEST_RESULT: FUNCTIONAL
TESTTIME_SECONDS: 25.61

## 2021-08-25 ASSESSMENT — PAIN SCALES - GENERAL
PAINLEVEL_OUTOF10: 0

## 2021-08-25 NOTE — FLOWSHEET NOTE
Patient was talking to her mother on zoom on the phone. Patient turned her phone around to introduce writer to her mother. Patient appears to have good support from her mother. Chaplains will remain available to offer spiritual and emotional support as needed. 08/24/21 1900   Encounter Summary   Services provided to: Patient; Family  (Mother on zoom.)   Referral/Consult From: [address] [place] [address] Parent; Family members   Continue Visiting   (8/24/21)   Complexity of Encounter Low   Length of Encounter 15 minutes   Spiritual Assessment Completed Yes   Routine   Type Initial   Assessment Calm; Approachable   Intervention Active listening;Explored feelings, thoughts, concerns;Nurtured hope;Prayer;Provided reading materials/devotional materials;Sustaining presence/ Ministry of presence; Discussed illness/injury and it's impact   Outcome Expressed gratitude;Engaged in conversation; Hopeful;Receptive   Visited by General [place]

## 2021-08-25 NOTE — FLOWSHEET NOTE
08/25/21 1844   Vital Signs   BP (!) 145/64   Temp 98.4 °F (36.9 °C)   Pulse 68   Resp 16   Weight 233 lb 11 oz (106 kg)   Weight Method Bed scale   Percent Weight Change -2.21   Post-Hemodialysis Assessment   Post-Treatment Procedures Blood returned;Catheter capped, clamped with Citrate x 2 ports   Machine Disinfection Process Acid/Vinegar Clean;Heat Disinfect; Exterior Machine Disinfection   Rinseback Volume (ml) 300 ml   Total Liters Processed (l/min) 76.8 l/min   Dialyzer Clearance Lightly streaked   Duration of Treatment (minutes) 210 minutes   Hemodialysis Output (ml) 2700 ml   NET Removed (ml) 3400 ml   Tolerated Treatment Good   Bilateral Breath Sounds Clear   Edema None   completed 3.5 hr HD TX, removed 2.4 Liters of fluid. pt tolerated HD TX well. cvc changed and is clean, dry and intact. report given to Jane Chino RN.

## 2021-08-25 NOTE — FLOWSHEET NOTE
08/25/21 1445   Encounter Summary   Services provided to: Patient   Referral/Consult From: [facility] Visiting   (8/25/21V)   Volunteer Visit Yes   Complexity of Encounter Low   Length of Encounter 15 minutes   Spiritual/Confucianism   Type Spiritual support   Intervention Communion;Prayer   Sacraments   Communion Patient received communion

## 2021-08-25 NOTE — PROGRESS NOTES
Physical Therapy  Facility/Department: [facility] ACUTE REHAB  Daily Treatment Note  NAME: Jacinta Ray  : 1960  MRN: [id number]    Date of Service: 2021    Discharge Recommendations:  Patient would benefit from continued therapy after discharge, Home with assist PRN   PT Equipment Recommendations  Equipment Needed: No    Assessment   Body structures, Functions, Activity limitations: Decreased functional mobility ; Decreased strength;Decreased endurance;Decreased balance; Increased pain  Treatment Diagnosis: CVA  Specific instructions for Next Treatment: Balance training, transfer training, gait training  REQUIRES PT FOLLOW UP: Yes  Activity Tolerance  Activity Tolerance: Patient Tolerated treatment well;Patient limited by endurance; Patient limited by fatigue     Patient Diagnosis(es): There were no encounter diagnoses. has a past medical history of Backache, unspecified, CHF (congestive heart failure) ([facility] [address] Chronic kidney disease, Coronary atherosclerosis of artery bypass graft, Cramp of limb, Gallstones, Hypertension, Insomnia, Pneumonia, Type II or unspecified type diabetes mellitus with renal manifestations, not stated as uncontrolled(250.40), Type II or unspecified type diabetes mellitus without mention of complication, not stated as uncontrolled, and Unspecified vitamin D deficiency. has a past surgical history that includes Coronary artery bypass graft; Knee arthroscopy; Carpal tunnel release; Breast surgery; Tonsillectomy; Hand surgery; Ankle fracture surgery; Cholecystectomy, open (N/A); and IR TUNNELED CVC PLACE WO SQ PORT/PUMP > 5 YEARS (2021). Restrictions  Restrictions/Precautions  Restrictions/Precautions: Fall Risk  Required Braces or Orthoses?: Yes (B lymphedema wraps)  Required Braces or Orthoses  Right Lower Extremity Brace:  (Lymphedema brace )  Left Lower Extremity Brace:  (Lymphedema brace )  Subjective   General  Chart Reviewed:  Yes  Additional Pertinent Hx: Presented with acute right arm weakness and mental status change. MRI brain showed probable acute to subacute lacunar infarct of the left frontal lobe. AURELIA on CKD stage 4 likely d/t contrast induced nephropathy. Dialysis was initiated on 8/6/21, had 2 treatment session, labs improving. Pt. has history of diabetes mellitus type II, CAD s/p CABG (2005), cervical stenosis, back pain, lymphedema, diastolic CHF, DVT, urinary retention, and iron deficiency anemia. Pt. admitted to ARU from [facility] 8/11/21. Response To Previous Treatment: Patient with no complaints from previous session. Family / Caregiver Present: No  Referring Practitioner: Dr. Smith Home: Patient eager and motivated for therapy. Expressing gratitude and stating how happy she is with her progress   Pain Screening  Patient Currently in Pain: No  Vital Signs  Patient Currently in Pain: No       Orientation  Orientation  Overall Orientation Status: Within Normal Limits  Objective   Bed mobility  Rolling to Left: Modified independent  Rolling to Right: Modified independent  Supine to Sit: Modified independent  Sit to Supine: Modified independent  Scooting: Modified independent  Comment: performed on flat mat with 2 pillows  Transfers  Sit to Stand: Modified independent  Stand to sit: Modified independent  Bed to Chair: Modified independent  Stand Pivot Transfers: Modified independent  Ambulation  Ambulation?: Yes  Ambulation 1  Surface: level tile  Device: Rollator  Assistance: Modified Independent  Gait Deviations: Slow Annie;Decreased step height;Decreased step length; Increased ELISABET  Distance: 200ft  Ambulation 2  Surface - 2: level tile  Device 2: Rollator  Assistance 2: Supervision  Distance: 207ft 2MWT; 100ft; 107ft  Comments: seated rest breaks on rollator between distances d/t increased SOB   Stairs/Curb  Stairs?: Yes  Stairs  # Steps : 12  Stairs Height: 8\"  Rails: Bilateral  Device: No Device  Assistance: Modified independent Examiner:  Begin with the subject in supine on a treatment mat. Instruct the subject to roll to the paretic side (lateral movement). Assist as necessary. Evaluated the subject's performance on the amount of help required. Do not consider the quality of performance. 3  Can perform without help  7. Supine to Non-paretic Side Lateral  Examiner:  Begin with the subject in supine on a treatment mat. Instruct the subject to roll to the non-paretic side (lateral movement). Assist as necessary. Evaluate the subject's Performance on the amount of help required. Do not consider the quality of performance. 3  Can perform without help  8. Supine to Sitting up on the Edge of the Mat   Examiner:  Begin with the subject in supine on a treatment mat. Instruct the subject to come to sitting on the edge of the mat. Assist as necessary. Evaluate the subject's performance on the amount of help required. Do not considered the quality performance. 3  Can perform without help   9. Sitting on the Edge of the Mat to Supine  Examiner: Begin with the subject sitting on the edge of a treatment mat. Instruct the subject to return to supine. Assist as necessary. Evaluate the subjects performance on the amount of help required. Co not consider the quality of performance. 3  Can perform without help  10. Sitting to Standing Up  Examiner:  Begin with the subject sitting on the edge of a treatment mat. Instruct the subject to stand up without support. Assist if necessary. Evaluated the subject's performance on the amount ot help required. Do not consider the quality of the performance. 3  Can perform without help  11. Standing Up to Sitting Down  Examiner:  Begin with the subject standing by the edge of a treatment mat. Instruct the subject to sit on the edge of mat without support. Assist if necessary. Evaluated the subject's performance on the amount of help required.   Do not consider the quality of the performance. 3  Can perform without help  12 . Standing, Picking up a Pencil from the Floor  Examiner:  Begin with the subject standing. Instruct the subject to  a pencil from the floor without support. Assist if necessary. Evaluate the subject's performance on the amount of help required. Do Not consider the quality of the performance. 3  Can perform without help    Changing Posture SUBTOTAL   21/21    PASS TOTAL   36/36   Goals  Short term goals  Time Frame for Short term goals: 5 days   Short term goal 1: Pt. to perform bed mobility independently. Short term goal 2: Pt. to tolerate 30 to 45 minutes of therapeutic exercise to improve strength and endurance for increased functional mobility. Short term goal 3: Pt. to improve seated static and dynamic balance to good. Short term goal 4: Pt. to improve standing static balance to fair+ and standing dynamic balance to fair. Short term goal 5: Pt. to ambulate 200ft. with supervision using a rollator. Short term goal 6: Pt. to ascend/descend 3 steps with CGA using one railing  Long term goals  Time Frame for Long term goals : By DC  Long term goal 1: Pt. to perform all transfers independently or with Mod-I. Long term goal 2: Pt. to improve standing static and dynamic balance to good. Long term goal 3: Pt. to ambulate 100ft. on an unlevel/ inclined surface with Mod-I using a rollator. Long term goal 4: Pt. to ascend/descend one flight of stairs with supervision   Long term goal 5: Pt. to ambulate 200ft. in 2 minutes with Mod-I using a rollator. Long term goal 6: Pt. to improve PASS score from 24/36 to 35/36. Patient Goals   Patient goals : Improve balance and strength to be able to walk independently with rollator.      Plan    Plan  Times per week: 1.5hrs/day  Times per day: Daily  Specific instructions for Next Treatment: Balance training, transfer training, gait training  Current Treatment Recommendations: Strengthening, Balance Training, Transfer Training, Functional Mobility Training, ROM, Endurance Training, Gait Training, Stair training, Pain Management, Home Exercise Program, Safety Education & Training, Neuromuscular Re-education, Patient/Caregiver Education & Training, Equipment Evaluation, Education, & procurement  Safety Devices  Type of devices:  All fall risk precautions in place, Call light within reach, Gait belt, Patient at risk for falls, Nurse notified        08/25/21 1106 08/25/21 1340   PT Individual Minutes   Time In [id number]   Time Out 5119 2560   Minutes 60 29     Electronically signed by Patrick Ortiz PTA on 8/25/21 at 3:21 PM EDT

## 2021-08-25 NOTE — PROGRESS NOTES
Speech Language Pathology  Speech Language Pathology  [facility] Acute Rehab Unit at [facility]    Cognitive Treatment Note    Date: 8/25/2021  Patients Name: Ag Maria  MRN: [id number]  Diagnosis:   Patient Active Problem List   Diagnosis Code    Coronary artery disease I25.10    Chronic diastolic heart failure (HCC) I50.32    Diabetic polyneuropathy associated with type 2 diabetes mellitus (HCC) E11.42    History of coronary artery bypass graft Z95.1    Iron deficiency anemia D50.9    Spinal stenosis of lumbar region with neurogenic claudication M48.062    Mixed hyperlipidemia E78.2    Stage 4 chronic kidney disease ([facility] [address] N18.4    Type 2 diabetes mellitus with kidney complication, with long-term current use of insulin (HCC) E11.29, Z79.4    Syncope and collapse R55    Obesity, Class II, BMI 35-39.9 E66.9    Thyroid nodule greater than or equal to 1 cm in diameter incidentally noted on imaging study E04.1    Essential hypertension I10    Anemia in stage 4 chronic kidney disease (HCC) N18.4, D63.1    Chronic ischemic heart disease I25.9    Acute cerebrovascular accident (CVA) ([facility] [address] I63.9       Pain: 5/10    Cognitive Treatment    Treatment time: 4103-4569    Subjective: [x] Alert [x] Cooperative     [] Confused     [] Agitated    [] Lethargic    Objective/Assessment:  Attention: Functional throughout    Recall: n/a     Problem Solving/Reasoning:  Pt. Completed 6 step ADL written sequencing c very min A. Pt. Completed written directions c 90% accuracy, 100% c cues. Other:         Plan:  [x] Continue ST services    [] Discharge from :      Discharge recommendations: [] Inpatient Rehab   [] [place]   [] Outpatient Therapy  [] Follow up at trauma clinic   [] Other:       Treatment completed by: Yodit Domínguez A.CCC/SLP

## 2021-08-25 NOTE — PROGRESS NOTES
HEMODIALYSIS PRE-TREATMENT NOTE    Patient Identifiers prior to treatment: name, band and birthdate    Isolation Required: MRSA                      Isolation Type: contact       (please document if patient is being managed as a PUI/COVID-19 patient)        Hepatitis status:                           Date Drawn                             Result  Hepatitis B Surface Antigen 08/13/2021 neg        Hepatitis B Surface Antibody 08/13/2021 pos     48.51   Hepatitis B Core Antibody 08/13/2021 neg          How was Hepatitis Status verified: labs     Was a copy of the labs you documented provided to facility for the patient's chart: yes    Hemodialysis orders verified:yes    Access Within normal limits ( I.e. s/s of infection,...): yes    Pre-Assessment completed: yes    Pre-dialysis report received from: Yaneli Yao Rn                      Time: 6522

## 2021-08-25 NOTE — PROGRESS NOTES
[facility]    CONSULTATION / HISTORY AND PHYSICAL EXAMINATION            Date:   8/25/2021  Patient name:  Claude Cedillo  Date of admission:  8/11/2021  5:47 PM  MRN:   [id number]  Account:  [de-identified]  YOB: 1960  PCP:    AFSANEH Chauhan CNP  Room:   [address] [place], [place] [id number]  Code Status:    Full Code    Physician Requesting Consult: Iman Mcknight MD    Reason for Consult:  medical management    Chief Complaint:     No chief complaint on file. Right upper and lower extremity weakness  History Obtained From:     Patient medical record nursing staff    History of Present Illness:   75-year-old  lady morbidly obese class III BMI 40.74 initially admitted to [facility] with right arm weakness and altered mental status she had an MRI done which showed acute lacunar infarct of the left frontal lobe medically managed history of chronic kidney disease had acute kidney injury required hemodialysis with right-sided Mauricio catheter in place in the jugular vein with improving renal function hemodialysis on hold    Noted to have some epistaxis this morning refusing to have anterior nasal packing done patient on anticoagulants  Multiple comorbid condition including hypertension hyperlipidemia coronary disease congestive heart failure diabetes mellitus    8/14   Patient is doing much better today  No new complaints    8/17   Plan for tunnel catheter  Blood sugars running high.   Past Medical History:     Past Medical History:   Diagnosis Date    Backache, unspecified     CHF (congestive heart failure) (HCC)     Chronic kidney disease     Coronary atherosclerosis of artery bypass graft     Cramp of limb     Gallstones     Hypertension     Insomnia     Pneumonia     Type II or unspecified type diabetes mellitus with renal manifestations, not stated as uncontrolled(250.40)     Type II or unspecified type diabetes mellitus without ferrous sulfate (BRIAN-ARCHIE) 325 (65 Fe) MG tablet Take 1 tablet by mouth daily 4/19/21   AFSANEH Ordonez CNP   allopurinol (ZYLOPRIM) 100 MG tablet Take 1 tablet by mouth daily 4/14/21   AFSANEH Ordonez CNP   isosorbide mononitrate (IMDUR) 30 MG extended release tablet Take 1 tablet by mouth daily 4/9/21   Fidelia Ball MD   traZODone (DESYREL) 50 MG tablet Take 75 mg by mouth nightly    Historical Provider, MD   bumetanide (BUMEX) 2 MG tablet Take 1 tablet by mouth daily 3/30/21   AFSANEH Ordonez CNP   atorvastatin (LIPITOR) 40 MG tablet Take 1 tablet by mouth daily 3/30/21   AFSANEH Ordonez CNP   carvedilol (COREG) 6.25 MG tablet Take 1 tablet by mouth 2 times daily 3/30/21   AFSANEH Ordonez CNP   ranolazine (RANEXA) 500 MG extended release tablet Take 1 tablet by mouth 2 times daily  Patient taking differently: Take 1,000 mg by mouth 2 times daily  3/30/21   AFSANEH Ordonez CNP   nitroGLYCERIN (NITROSTAT) 0.4 MG SL tablet Place 1 tablet under the tongue every 5 minutes as needed for Chest pain. 3/29/15   AFSANEH Nice CNP   Insulin Pen Needle (BD ULTRA-FINE PEN NEEDLES) 29G X 12.7MM MISC 1 each by Does not apply route 6 times daily. For use with each insulin 12/23/14   John Arechiga MD   docusate sodium (COLACE) 100 MG capsule Take 100 mg by mouth 2 times daily. Historical Provider, MD   Lancets 28G MISC Inject 1 each into the skin 3 times daily. 10/30/14   John Arechiga MD        Allergies:     Adhesive tape, Ace inhibitors, Iv dye [iodides], and Metformin and related    Social History:     Tobacco:    reports that she has never smoked. She has never used smokeless tobacco.  Alcohol:      reports no history of alcohol use. Drug Use:  reports no history of drug use.     Family History:     Family History   Problem Relation Age of Onset    Diabetes Father     Heart Failure Father        Review of Systems:     Positive and Negative as described in HPI.    CONSTITUTIONAL:  negative for fevers, chills, sweats, fatigue, weight loss  HEENT:  negative for vision, hearing changes, runny nose, throat pain    RESPIRATORY:  negative for shortness of breath, cough, congestion, wheezing. CARDIOVASCULAR:  negative for chest pain, palpitations. GASTROINTESTINAL:  negative for nausea, vomiting, diarrhea, constipation, change in bowel habits, abdominal pain   GENITOURINARY:  negative for difficulty of urination, burning with urination, frequency   INTEGUMENT:  negative for rash, skin lesions, easy bruising   HEMATOLOGIC/LYMPHATIC:  negative for swelling/edema   ALLERGIC/IMMUNOLOGIC:  negative for urticaria , itching  ENDOCRINE:  negative increase in drinking, increase in urination, hot or cold intolerance  MUSCULOSKELETAL:  negative joint pains, muscle aches, swelling of joints  NEUROLOGICAL: Positive for right upper extremity and right lower extremity mild weakness  BEHAVIOR/PSYCH: Denies depression    Physical Exam:     /68   Pulse 72   Temp 97.7 °F (36.5 °C) (Oral)   Resp 18   Ht 5' 5\" (1.651 m)   Wt 228 lb 2.8 oz (103.5 kg)   SpO2 96%   BMI 37.97 kg/m²   Temp (24hrs), Av.6 °F (36.4 °C), Min:97.5 °F (36.4 °C), Max:97.7 °F (36.5 °C)    Recent Labs     21  1622 21  2030 21  0619 21  1141   POCGLU 279* 190* 95 273*       Intake/Output Summary (Last 24 hours) at 2021 1506  Last data filed at 2021 1750  Gross per 24 hour   Intake 240 ml   Output --   Net 240 ml     Dialysis catheter noted in the jugular vein right side of the neck  General Appearance:  alert, well appearing, and in no acute distress  Mental status: oriented to person, place, and time with normal affect  Head:  normocephalic, atraumatic.   Eye: no icterus, redness, pupils equal and reactive, extraocular eye movements intact, conjunctiva clear  Ear: normal external ear, no discharge, hearing intact  Nose:  no drainage noted  Small amount of epistaxis noted    Mouth: mucous membranes moist  Neck: supple, no carotid bruits, thyroid not palpable  Lungs: Bilateral equal air entry, clear to ausculation, no wheezing, rales or rhonchi, normal effort  Cardiovascular: normal rate, regular rhythm, no murmur, gallop, rub. Abdomen: Soft, nontender, nondistended, normal bowel sounds, no hepatomegaly or splenomegaly  Neurologic: There are no new focal motor or sensory deficits, normal muscle tone and bulk, no abnormal sensation, normal speech, cranial nerves II through XII grossly intact  Positive for right upper and right lower extremity 4 out of 5 strength strength  Skin: No gross lesions, rashes, bruising or bleeding on exposed skin area  Extremities:  peripheral pulses palpable, no pedal edema or calf pain with palpation  Psych:      Investigations:      Laboratory Testing:  Recent Results (from the past 24 hour(s))   POC Glucose Fingerstick    Collection Time: 08/24/21  4:22 PM   Result Value Ref Range    POC Glucose 279 (H) 65 - 105 mg/dL   POC Glucose Fingerstick    Collection Time: 08/24/21  8:30 PM   Result Value Ref Range    POC Glucose 190 (H) 65 - 105 mg/dL   POC Glucose Fingerstick    Collection Time: 08/25/21  6:19 AM   Result Value Ref Range    POC Glucose 95 65 - 105 mg/dL   Basic Metabolic Panel w/ Reflex to MG    Collection Time: 08/25/21  7:14 AM   Result Value Ref Range    Glucose 113 (H) 70 - 99 mg/dL    BUN 35 (H) 8 - 23 mg/dL    CREATININE 3.79 (H) 0.50 - 0.90 mg/dL    Bun/Cre Ratio NOT REPORTED 9 - 20    Calcium 9.5 8.6 - 10.4 mg/dL    Sodium 138 135 - 144 mmol/L    Potassium 4.2 3.7 - 5.3 mmol/L    Chloride 99 98 - 107 mmol/L    CO2 27 20 - 31 mmol/L    Anion Gap 12 9 - 17 mmol/L    GFR Non-African American 12 (L) >60 mL/min    GFR African American 15 (L) >60 mL/min    GFR Comment          GFR Staging NOT REPORTED    CBC    Collection Time: 08/25/21  7:14 AM   Result Value Ref Range    WBC 4.4 3.5 - 11.0 k/uL    RBC 3.41 (L) 4.0 - 5.2 m/uL Hemoglobin 10.8 (L) 12.0 - 16.0 g/dL    Hematocrit 33.3 (L) 36 - 46 %    MCV 97.5 80 - 100 fL    MCH 31.8 26 - 34 pg    MCHC 32.6 31 - 37 g/dL    RDW 16.6 (H) 11.5 - 14.9 %    Platelets 630 160 - 738 k/uL    MPV 7.8 6.0 - 12.0 fL    NRBC Automated NOT REPORTED per 100 WBC   POC Glucose Fingerstick    Collection Time: 08/25/21 11:41 AM   Result Value Ref Range    POC Glucose 273 (H) 65 - 105 mg/dL           Consultations:   IP CONSULT TO DIETITIAN  IP CONSULT TO SOCIAL WORK  IP CONSULT TO INTERNAL MEDICINE  IP CONSULT TO NEPHROLOGY  Assessment :      Primary Problem  <principal problem not specified>    Active Hospital Problems    Diagnosis Date Noted    Acute cerebrovascular accident (CVA) ([facility] [I63.9] 08/11/2021    Chronic ischemic heart disease [I25.9] 07/23/2021    Anemia in stage 4 chronic kidney disease ([facility] [N18.4, D63.1] 05/03/2021    Essential hypertension [I10] 05/03/2021    History of coronary artery bypass graft [Z95.1] 03/31/2021    Diabetic polyneuropathy associated with type 2 diabetes mellitus ([facility] [E11.42] 02/17/2020    Chronic diastolic heart failure ([facility] [I50.32] 09/20/2018    Mixed hyperlipidemia [E78.2] 07/27/2016    Coronary artery disease [I25.10] 05/04/2015   Active Problems:    Coronary artery disease    Chronic diastolic heart failure (HCC)    Diabetic polyneuropathy associated with type 2 diabetes mellitus ([facility]    History of coronary artery bypass graft    Mixed hyperlipidemia    Essential hypertension    Anemia in stage 4 chronic kidney disease (HCC)    Chronic ischemic heart disease    Acute cerebrovascular accident (CVA) ([facility]  Resolved Problems:    * No resolved hospital problems.  *        Plan:     78-year-old lady morbidly obese class III BMI 40.74 history of chronic kidney disease hypertension coronary disease chronic diastolic congestive heart failure  New diagnosis of acute lacunar infarct with right upper and lower extremity weakness  Acute on chronic kidney disease required hemodialysis with a Mauricio catheter  Diabetes mellitus with neuropathy Lantus sliding scale, some readings of high blood sugars, increasing Lantus to 53 units twice a day, monitoring closely  Gabapentin continue for diabetic neuropathy  Anemia of chronic kidney disease received IV iron  Coronary artery disease continue carvedilol and Ranexa  Chronic diastolic congestive heart failure compensated continue Lasix  Plan for hd per nephrologist        Carter Ortiz MD  8/25/2021  3:06 PM    Copy sent to Dr. Mallory Tracey, APRN - CNP    Please note that this chart was generated using voice recognition Dragon dictation software. Although every effort was made to ensure the accuracy of this automated transcription, some errors in transcription may have occurred.

## 2021-08-25 NOTE — PLAN OF CARE
Problem: Falls - Risk of:  Goal: Will remain free from falls  Description: Will remain free from falls  8/25/2021 1238 by Yaneli Yao RN  Outcome: Ongoing  8/25/2021 0448 by Mika Sommer RN  Outcome: Ongoing  Goal: Absence of physical injury  Description: Absence of physical injury  8/25/2021 1238 by Yaneli Yao RN  Outcome: Ongoing  8/25/2021 0448 by Mika Sommer RN  Outcome: Ongoing     Problem: Skin Integrity:  Goal: Will show no infection signs and symptoms  Description: Will show no infection signs and symptoms  8/25/2021 1238 by Yaneli Yao RN  Outcome: Ongoing  8/25/2021 0448 by Mika Sommer RN  Outcome: Ongoing  Goal: Absence of new skin breakdown  Description: Absence of new skin breakdown  8/25/2021 1238 by Yaneli Yao RN  Outcome: Ongoing  8/25/2021 0448 by Mika Sommer RN  Outcome: Ongoing     Problem: Pain:  Goal: Pain level will decrease  Description: Pain level will decrease  8/25/2021 1238 by Yaneli Yao RN  Outcome: Ongoing  8/25/2021 0448 by Mika Sommer RN  Outcome: Ongoing  Goal: Control of acute pain  Description: Control of acute pain  8/25/2021 1238 by Yaneli Yao RN  Outcome: Ongoing  8/25/2021 0448 by Mika Sommer RN  Outcome: Ongoing  Goal: Control of chronic pain  Description: Control of chronic pain  8/25/2021 1238 by Yaneli Yao RN  Outcome: Ongoing  8/25/2021 0448 by Mika Sommer RN  Outcome: Ongoing     Problem: Musculor/Skeletal Functional Status  Goal: Highest potential functional level  8/25/2021 1238 by Yaneli Yao RN  Outcome: Ongoing  8/25/2021 0448 by Mika Sommer RN  Outcome: Ongoing  Goal: Absence of falls  8/25/2021 1238 by Yaenli Yao RN  Outcome: Ongoing  8/25/2021 0448 by Mika Sommer RN  Outcome: Ongoing     Problem: Nutrition  Goal: Optimal nutrition therapy  Outcome: Ongoing     Problem:  Activity:  Goal: Fatigue will decrease  Description: Fatigue will decrease  Outcome: Ongoing  Goal: Risk for activity intolerance will decrease  Description: Risk for activity intolerance will decrease  8/25/2021 1238 by Paloma Hall RN  Outcome: Ongoing  8/25/2021 0448 by Gloria Mims RN  Outcome: Ongoing     Problem: Fluid Volume:  Goal: Will show no signs or symptoms of fluid imbalance  Description: Will show no signs or symptoms of fluid imbalance  Outcome: Ongoing

## 2021-08-25 NOTE — PROGRESS NOTES
Physical Medicine & Rehabilitation  Progress Note      Subjective:      Cesia Carrera is a 59 y.o. female with ischemic CVA. She reports doing well today. She is ready to go home tomorrow. Discussed plans for discharge. She denies any acute concerns. ROS:  Denies fevers, chills, sweats. No chest pain, palpitations, lightheadedness. Denies coughing, wheezing or shortness of breath. Denies abdominal pain, nausea, diarrhea or constipation. No new areas of joint pain. Denies new areas of numbness or weakness. Denies new anxiety or depression issues. No new skin problems. Rehabilitation:   Progressing in therapies. PT:  Restrictions/Precautions: Fall Risk  Required Braces or Orthoses  Right Lower Extremity Brace:  (Lymphedema brace )  Left Lower Extremity Brace:  (Lymphedema brace )   Transfers  Sit to Stand: Modified independent  Stand to sit: Modified independent  Bed to Chair: Modified independent  Stand Pivot Transfers: Modified independent  Lateral Transfers: Contact guard assistance  Comment: Transfers assessed with rollator. Steady, no LOB noted  Ambulation 1  Surface: level tile  Device: Rollator  Assistance: Modified Independent  Quality of Gait: Required seated break on rollator post 100 ft. No LOB  Gait Deviations: Slow Annie, Decreased step height, Decreased step length, Increased ELISABET  Distance: 200ft  Comments: patient ambulated to and from therapy gym with 1 seated rest break during distances     Transfers  Sit to Stand: Modified independent  Stand to sit: Modified independent  Bed to Chair: Modified independent  Stand Pivot Transfers: Modified independent  Lateral Transfers: Contact guard assistance  Comment: Transfers assessed with rollator. Steady, no LOB noted  Ambulation  Ambulation?: Yes  Ambulation 1  Surface: level tile  Device: Rollator  Assistance: Modified Independent  Quality of Gait: Required seated break on rollator post 100 ft.   No LOB  Gait Deviations: Slow Annie, Decreased step height, Decreased step length, Increased ELISABET  Distance: 200ft  Comments: patient ambulated to and from therapy gym with 1 seated rest break during distances     Surface: level tile  Ambulation 1  Surface: level tile  Device: Rollator  Assistance: Modified Independent  Quality of Gait: Required seated break on rollator post 100 ft. No LOB  Gait Deviations: Slow Annie, Decreased step height, Decreased step length, Increased ELISABET  Distance: 200ft  Comments: patient ambulated to and from therapy gym with 1 seated rest break during distances     OT:  ADL  Equipment Provided: Reacher, Sock aid, Long-handled sponge  Feeding: Modified independent   Grooming: Modified independent  (seated at sink)  UE Bathing: Modified independent   LE Bathing: Modified independent  (standing at grab bar for buttocks/stalin area)  UE Dressing: Modified independent  (from w/c level)  LE Dressing: Modified independent  (pants only this date. Pt reports donning socks c mod I )  Toileting: Modified independent   Additional Comments: GARCIA facilitated pt in full ADL routine this AM. Pt gathers clothing/towels rom w/c level prior. Instrumental ADL's  Instrumental ADLs: Yes     Balance  Sitting Balance: Modified independent   Standing Balance: Supervision (short stands for functional tasks. )   Standing Balance  Time: AM: 30-60 sec X5; PM: 2:45  Activity: functional trasnfers, ADLs; PM: tabletop task  Comment: 1-2 UE support. steady. no LOB  Functional Mobility  Functional - Mobility Device: Wheelchair  Activity: Retrieve items, Transport items, To/from bathroom  Assist Level: Modified independent   Functional Mobility Comments: pt requested use of wheelchair this date due to increased fatigue.       Bed mobility  Bridging: Stand by assistance  Rolling to Left: Modified independent  Rolling to Right: Modified independent  Supine to Sit: Modified independent  Sit to Supine: Modified independent  Scooting: Modified independent  Comment: performed on flat mat with 2 pillows  Transfers  Sit to stand: Supervision  Stand to sit: Supervision  Transfer Comments: Verbal cues for hand placement and safety   Toilet Transfers  Toilet - Technique: Stand pivot  Equipment Used: Raised toilet seat with rails  Toilet Transfer: Supervision  Toilet Transfers Comments: grab rail      Shower Transfers  Shower - Transfer From: Other (grab bars)  Shower - Transfer Type: To and From  Shower - Transfer To: Transfer tub bench  Shower - Technique: Ambulating  Shower Transfers: Contact Guard  Shower Transfers Comments: Pt uses sink and grab bars for UE support entering/exiting shower. No LOB noted       SPEECH:  Subjective: [x]? Alert     [x]? Cooperative     []? Confused     []? Agitated    []? Lethargic     Objective/Assessment:  Attention: Functional throughout     Recall: n/a      Problem Solving/Reasoning:  Pt. Completed 6 step ADL written sequencing c very min A.   Pt. Completed written directions c 90% accuracy, 100% c cues.     Other:          Current Medications:   Current Facility-Administered Medications: insulin glargine (LANTUS) injection vial 53 Units, 53 Units, Subcutaneous, BID  traZODone (DESYREL) tablet 100 mg, 100 mg, Oral, Nightly  sodium chloride (OCEAN, BABY AYR) 0.65 % nasal spray 1 spray, 1 spray, Each Nostril, PRN  acetaminophen (TYLENOL) tablet 650 mg, 650 mg, Oral, Q4H PRN  polyethylene glycol (GLYCOLAX) packet 17 g, 17 g, Oral, Daily  senna (SENOKOT) tablet 17.2 mg, 2 tablet, Oral, Daily PRN  bisacodyl (DULCOLAX) suppository 10 mg, 10 mg, Rectal, Daily PRN  febuxostat (ULORIC) tablet 40 mg, 40 mg, Oral, Daily  tamsulosin (FLOMAX) capsule 0.4 mg, 0.4 mg, Oral, Daily  gabapentin (NEURONTIN) capsule 300 mg, 300 mg, Oral, BID  furosemide (LASIX) tablet 80 mg, 80 mg, Oral, BID  pantoprazole (PROTONIX) tablet 40 mg, 40 mg, Oral, QAM AC  ranolazine (RANEXA) extended release tablet 1,000 mg, 1,000 mg, Oral, BID  insulin lispro (HUMALOG) injection vial 0-12 Units, 0-12 Units, Subcutaneous, TID WC  insulin lispro (HUMALOG) injection vial 0-6 Units, 0-6 Units, Subcutaneous, Nightly  glucose (GLUTOSE) 40 % oral gel 15 g, 15 g, Oral, PRN  dextrose 50 % IV solution, 12.5 g, IntraVENous, PRN  glucagon (rDNA) injection 1 mg, 1 mg, Intramuscular, PRN  dextrose 5 % solution, 100 mL/hr, IntraVENous, PRN  apixaban (ELIQUIS) tablet 5 mg, 5 mg, Oral, BID  aspirin chewable tablet 81 mg, 81 mg, Oral, Daily  atorvastatin (LIPITOR) tablet 80 mg, 80 mg, Oral, Nightly  busPIRone (BUSPAR) tablet 7.5 mg, 7.5 mg, Oral, TID  carvedilol (COREG) tablet 6.25 mg, 6.25 mg, Oral, BID WC  ferrous sulfate (IRON 325) tablet 325 mg, 325 mg, Oral, BID       Objective:  BP (!) 118/58   Pulse 72   Temp 98.2 °F (36.8 °C) (Oral)   Resp 18   Ht 5' 5\" (1.651 m)   Wt 233 lb 11 oz (106 kg)   SpO2 93%   BMI 38.89 kg/m²       GEN: Well developed, well nourished, no acute distress  HEENT: NCAT. EOM grossly intact. Hearing grossly intact. Mucous membranes pink and moist.  RESP: Normal breath sounds with no wheezing, rales, or rhonchi. Respirations WNL and unlabored. CV: Regular rate and rhythm. No murmurs, rubs, or gallops. ABD: Soft, non-distended, BS+ and equal.  NEURO: Alert. Speech fluent. Sensation to light touch intact. MSK:  Muscle bulk is normal bilaterally. Strength 5/5 in bilateral upper limbs. No tremors noted in right upper limb at the time ov evaluation. LIMBS:  Edema present in bilateral lower limbs - stable (has lymphedema wraps in place). SKIN: Warm and dry with good turgor. PSYCH: Mood WNL. Affect WNL. Appropriately interactive.     Diagnostics:     CBC:   Recent Labs     08/25/21  0714   WBC 4.4   RBC 3.41*   HGB 10.8*   HCT 33.3*   MCV 97.5   RDW 16.6*        BMP:   Recent Labs     08/23/21  0639 08/25/21  0714    138   K 4.2 4.2    99   CO2 29 27   BUN 41* 35*   CREATININE 3.20* 3.79*   GLUCOSE 152* 113*     BNP: No results for input(s): BNP in the last 72 hours. PT/INR: No results for input(s): PROTIME, INR in the last 72 hours. APTT: No results for input(s): APTT in the last 72 hours. CARDIAC ENZYMES: No results for input(s): CKMB, CKMBINDEX, TROPONINT in the last 72 hours. Invalid input(s): CKTOTAL;3  FASTING LIPID PANEL:  Lab Results   Component Value Date    CHOL 105 04/09/2015    HDL 43 04/09/2015    TRIG 168 04/09/2015     LIVER PROFILE: No results for input(s): AST, ALT, ALB, BILIDIR, BILITOT, ALKPHOS in the last 72 hours. Impression/Plan:   Impaired ADLs, gait, and mobility due to:    1. Ischemic CVA L frontal lobe with mild R dominant hemiparesis:  PT/OT for gait, mobility, strengthening, endurance, ADLs, and self care. On ASA, atorvastatin  2. Right wrist/hand tremors/spasms:  Improving overall per patient. May be related to fatigue. No spasticity noted on exam.  Will continue to monitor. 3. HTN/CAD/Angina/Diastolic CHF: on carvedilol, furosemide, Ranexa  4. Insomnia: on Trazodone - was taking 75 mg at home - increased to 100 mg on 8/12. Sleep improved. 5. Anxiety: on buspirone  6. DM with neuropathy: on lantus, sliding scale, gabapentin  7. AURELIA on CKD IV: Initiated on HD at [facility] [place] [facility]. Nephrology following - continuing lasix at 80 mg BID. For HD MWF. IT placed tunneled cath 8/18.  8. Anemia of chronic disease: on ferrous sulfate. Had IV iron at [facility]. Hb low but stable. Monitoring routinely  9. Chronic neck and back pain: pain stable. Has prn Tylenol. Ice prn. 10. Urine retention: on flomax  11. Bowel Management: Miralax daily, senokot prn, dulcolax prn.   12. DVT Prophylaxis:  SCD's while in bed, AGUSTIN's and Eliquis  13.  Internal Medicine for medical management      Electronically signed by Vandana Cameron MD on 8/25/2021 at 9:59 PM

## 2021-08-25 NOTE — PROGRESS NOTES
[address]    ACUTE REHABILITATION OCCUPATIONAL THERAPY  DAILY NOTE    Date: 21  Patient Name: Terry Galeas      Room: 2574/4805-31    MRN: [id number]   : 1960  (59 y.o.)  Gender: female   Referring Practitioner: Vamsi Weems MD  Diagnosis: CVA  Additional Pertinent Hx: Presented with acute right arm weakness and mental status change. MRI brain showed probable acute to subacute lacunar infarct of the left frontal lobe. AURELIA on CKD stage 4 likely d/t contrast induced nephropathy. Dialysis was initiated on 21, had 2 treatment session, labs improving. Pt. has history of diabetes mellitus type II, CAD s/p CABG (), cervical stenosis, back pain, lymphedema, diastolic CHF, DVT, urinary retention, and iron deficiency anemia. Pt. admitted to ARU from [place] [id number] 21. Restrictions  Restrictions/Precautions: Fall Risk  Required Braces or Orthoses  Right Lower Extremity Brace:  (Lymphedema brace )  Left Lower Extremity Brace:  (Lymphedema brace )  Required Braces or Orthoses?: Yes (B lymphedema wraps)    Subjective  Subjective: \"I feel good this morning\"  Patient Currently in Pain: No  Restrictions/Precautions: Fall Risk  Overall Orientation Status: Within Functional Limits     Pain Assessment  Pain Assessment: 0-10  Pain Level: 8  Pain Type: Acute pain, Chronic pain  Pain Location: Leg, Foot, Neck  Pain Orientation: Right, Left    Objective  Cognition  Overall Cognitive Status: Impaired  Additional Comments: slower processing with tasks  Perception  Overall Perceptual Status: WFL  Balance  Sitting Balance: Modified independent   Standing Balance: Supervision (short stands for functional tasks. )  Transfers  Sit to stand: Supervision  Stand to sit: Supervision  Standing Balance  Time: AM: 30-60 sec X5; PM: 2:45  Activity: functional trasnfers, ADLs; PM: tabletop task  Comment: 1-2 UE support. steady.  no LOB  Functional Mobility  Functional - Mobility Device: Wheelchair  Activity: Retrieve ; To/from bathroom  Assist Level: Modified independent   Functional Mobility Comments: pt requested use of wheelchair this date due to increased fatigue. Toilet Transfers  Toilet - Technique: Stand pivot  Equipment Used: Raised toilet seat with rails  Toilet Transfer: Supervision  Toilet Transfers Comments: grab rail   Shower Transfers  Shower - Transfer From: Other (grab bars)  Shower - Transfer Type: To and From  Shower - Transfer To: Transfer tub bench  Shower - Technique: Ambulating  Shower Transfers: Contact Guard  Shower Transfers Comments: Pt uses sink and grab bars/sink for UE support entering/exiting shower. No LOB noted           Additional Activities: Pt engages in tabletop taks for standing tolerance with pt standing for 2:45 before requiring seated rest breask due to increased overall fatigue. ADL  Feeding: Modified independent   Grooming: Modified independent  (seated at sink)  UE Bathing: Modified independent   LE Bathing: Modified independent  (standing at grab bar for buttocks/stalin area)  UE Dressing: Modified independent  (from w/c level)  LE Dressing: Modified independent  (pants only this date. Pt reports donning socks c mod I )  Toileting: Modified independent   Additional Comments: GARCIA facilitated pt in full ADL routine this AM. Pt gathers clothing/towels rom w/c level prior. Left Hand Strength -  (lbs)  Handle Setting 2: 37, 39, 40; Average 38.6# (Norms 35-37#)        Right Hand Strength -  (lbs)  Handle Setting 2: 39, 37, 36; Average 37.33# (Norms 35-37#)        Fine Motor Skills  Left 9-Hole Peg Test: Functional  Left 9 Hole Peg Test Time (secs): 25.61  Right 9-Hole Peg Test: Functional  Right 9 Hole Peg Test Time (secs): 25.41  Fine Motor Comment: NORMS 19-28 secs. Assessment  Performance deficits / Impairments: Decreased ADL status; Decreased functional mobility ; Decreased strength;Decreased safe awareness;Decreased cognition;Decreased endurance;Decreased balance;Decreased high-level IADLs;Decreased coordination  Prognosis: Good  Discharge Recommendations: Patient would benefit from continued therapy after discharge;Home with assist PRN  Activity Tolerance: Patient Tolerated treatment well  Safety Devices in place: Yes  Type of devices: Left in chair;Call light within reach; All fall risk precautions in place  Equipment Recommendations  Equipment Needed: Yes  Reacher: x  Sock Aid: x       Patient Education: OT POC, safety with functional transfers in/out of shower, energy conservation during ADLs, planning for tasks for optimal work simplification of functional tasks. Patient Goals   Patient goals : \"I would like to have energy and balance and to just be able to walk around and go to the store and walk around. \"   Learner:patient  Method: explanation       Outcome: acknowledged understanding of, demonstrated understanding         Plan  Plan  Times per week: 5-7  Times per day: Twice a day  Current Treatment Recommendations: Self-Care / ADL, Strengthening, ROM, Balance Training, Functional Mobility Training, Endurance Training, Neuromuscular Re-education, Cognitive Reorientation, Pain Management, Safety Education & Training, Patient/Caregiver Education & Training, Equipment Evaluation, Education, & procurement, Home Management Training, Cognitive/Perceptual Training  Patient Goals   Patient goals : \"I would like to have energy and balance and to just be able to walk around and go to the store and walk around. \"   Short term goals  Time Frame for Short term goals: By 5-6 days  Short term goal 1: Pt will complete lower body dressing with CGA and good safety  Short term goal 2: Pt will complete functional trasnfers/mobility during self care tasks with supervision and good safety with use of least restrictive device  Short term goal 3: Pt will tolerate standing 5+ minutes during functional activity of choice with good safety   Short term goal 4: Pt will verbalize/demonstrate good understanding of energy conservation techniques to increase safety and independence with self care tasks  Short term goal 5: Pt will participate in 15+ minutes of therapeutic exercises/functional activities to increase safety and independence with self care tasks. Long term goals  Time Frame for Long term goals : By discharge  Long term goal 1: Pt will complete BADLs with modified independence and good safety  Long term goal 2: Pt will complete functional transfers/mobility during self care tasks with modified independence and good safety with use of least restrictive device  Long term goal 3: Pt will tolerate standing 8+ minutes during functional activity of choice with good safety  Long term goal 4: Pt will complete simple meal prep/light house keeping task with modified independence and good safety   Long term goal 5: Pt will verbalize/demonstrate good understanding of home safety/fall prevention to increase safety and independence with self care tasks.    Long term goals 6: Pt will demonstrated increased  strength and coordination during self care tasks as evident by and an improvement on the 9 hole peg test by 3 seconds and increased  strength by 5#        08/25/21 1100 08/25/21 1554   OT Individual Minutes   Time In 0836 1230   Time Out 0932 1304   Minutes 56 34     Electronically signed by Tilmon Goldmann, COTA/L on 8/25/21 at 3:55 PM EDT

## 2021-08-26 VITALS
HEART RATE: 61 BPM | SYSTOLIC BLOOD PRESSURE: 124 MMHG | OXYGEN SATURATION: 93 % | DIASTOLIC BLOOD PRESSURE: 58 MMHG | HEIGHT: 65 IN | WEIGHT: 233.69 LBS | TEMPERATURE: 98 F | BODY MASS INDEX: 38.93 KG/M2 | RESPIRATION RATE: 16 BRPM

## 2021-08-26 LAB — GLUCOSE BLD-MCNC: 158 MG/DL (ref 65–105)

## 2021-08-26 PROCEDURE — 97110 THERAPEUTIC EXERCISES: CPT

## 2021-08-26 PROCEDURE — 99238 HOSP IP/OBS DSCHRG MGMT 30/<: CPT | Performed by: STUDENT IN AN ORGANIZED HEALTH CARE EDUCATION/TRAINING PROGRAM

## 2021-08-26 PROCEDURE — 97535 SELF CARE MNGMENT TRAINING: CPT

## 2021-08-26 PROCEDURE — 97530 THERAPEUTIC ACTIVITIES: CPT

## 2021-08-26 PROCEDURE — 6370000000 HC RX 637 (ALT 250 FOR IP): Performed by: PHYSICAL MEDICINE & REHABILITATION

## 2021-08-26 PROCEDURE — 97129 THER IVNTJ 1ST 15 MIN: CPT

## 2021-08-26 PROCEDURE — 6370000000 HC RX 637 (ALT 250 FOR IP): Performed by: INTERNAL MEDICINE

## 2021-08-26 PROCEDURE — 97116 GAIT TRAINING THERAPY: CPT

## 2021-08-26 PROCEDURE — 97130 THER IVNTJ EA ADDL 15 MIN: CPT

## 2021-08-26 PROCEDURE — 82947 ASSAY GLUCOSE BLOOD QUANT: CPT

## 2021-08-26 RX ADMIN — GABAPENTIN 300 MG: 300 CAPSULE ORAL at 07:34

## 2021-08-26 RX ADMIN — TAMSULOSIN HYDROCHLORIDE 0.4 MG: 0.4 CAPSULE ORAL at 07:34

## 2021-08-26 RX ADMIN — PANTOPRAZOLE SODIUM 40 MG: 40 TABLET, DELAYED RELEASE ORAL at 06:05

## 2021-08-26 RX ADMIN — FEBUXOSTAT 40 MG: 40 TABLET, FILM COATED ORAL at 07:37

## 2021-08-26 RX ADMIN — INSULIN GLARGINE 53 UNITS: 100 INJECTION, SOLUTION SUBCUTANEOUS at 07:38

## 2021-08-26 RX ADMIN — ASPIRIN 81 MG: 81 TABLET, CHEWABLE ORAL at 07:34

## 2021-08-26 RX ADMIN — FUROSEMIDE 80 MG: 40 TABLET ORAL at 07:34

## 2021-08-26 RX ADMIN — CARVEDILOL 6.25 MG: 6.25 TABLET, FILM COATED ORAL at 07:34

## 2021-08-26 RX ADMIN — RANOLAZINE 1000 MG: 1000 TABLET, FILM COATED, EXTENDED RELEASE ORAL at 07:36

## 2021-08-26 RX ADMIN — ACETAMINOPHEN 650 MG: 325 TABLET, FILM COATED ORAL at 08:30

## 2021-08-26 RX ADMIN — APIXABAN 5 MG: 5 TABLET, FILM COATED ORAL at 07:34

## 2021-08-26 RX ADMIN — BUSPIRONE HYDROCHLORIDE 7.5 MG: 7.5 TABLET ORAL at 07:35

## 2021-08-26 RX ADMIN — INSULIN LISPRO 2 UNITS: 100 INJECTION, SOLUTION INTRAVENOUS; SUBCUTANEOUS at 08:29

## 2021-08-26 ASSESSMENT — PAIN DESCRIPTION - LOCATION
LOCATION: KNEE
LOCATION: NECK

## 2021-08-26 ASSESSMENT — PAIN SCALES - GENERAL
PAINLEVEL_OUTOF10: 5
PAINLEVEL_OUTOF10: 5
PAINLEVEL_OUTOF10: 4
PAINLEVEL_OUTOF10: 4

## 2021-08-26 ASSESSMENT — PAIN DESCRIPTION - PAIN TYPE
TYPE: CHRONIC PAIN
TYPE: ACUTE PAIN

## 2021-08-26 ASSESSMENT — PAIN DESCRIPTION - ORIENTATION: ORIENTATION: RIGHT

## 2021-08-26 NOTE — PROGRESS NOTES
(x1 rail on R side. )  Toilet Transfer: Modified independent        ADL  Feeding: Modified independent   Grooming: Modified independent   UE Bathing: Modified independent   LE Bathing: None  UE Dressing: Modified independent   LE Dressing: Minimal assistance (Assist with donning compression socks. )  Toileting: Modified independent           Assessment  Performance deficits / Impairments: Decreased ADL status; Decreased functional mobility ; Decreased strength;Decreased safe awareness;Decreased cognition;Decreased endurance;Decreased balance;Decreased high-level IADLs;Decreased coordination  Prognosis: Good  Activity Tolerance: Patient Tolerated treatment well  Safety Devices in place: Yes  Equipment Recommendations  Equipment Needed: Yes  Reacher: x  Sock Aid: x        Plan  Plan  Times per week: 5-7  Times per day: Twice a day  Current Treatment Recommendations: Self-Care / ADL, Strengthening, ROM, Balance Training, Functional Mobility Training, Endurance Training, Neuromuscular Re-education, Cognitive Reorientation, Pain Management, Safety Education & Training, Patient/Caregiver Education & Training, Equipment Evaluation, Education, & procurement, Home Management Training, Cognitive/Perceptual Training  Patient Goals   Patient goals : \"I would like to have energy and balance and to just be able to walk around and go to the store and walk around. \"   Short term goals  Time Frame for Short term goals: By 5-6 days  Short term goal 1: Pt will complete lower body dressing with CGA and good safety  Short term goal 2: Pt will complete functional trasnfers/mobility during self care tasks with supervision and good safety with use of least restrictive device  Short term goal 3: Pt will tolerate standing 5+ minutes during functional activity of choice with good safety   Short term goal 4: Pt will verbalize/demonstrate good understanding of energy conservation techniques to increase safety and independence with self care tasks  Short term goal 5: Pt will participate in 15+ minutes of therapeutic exercises/functional activities to increase safety and independence with self care tasks. Long term goals  Time Frame for Long term goals : By discharge  Long term goal 1: Pt will complete BADLs with modified independence and good safety  Long term goal 2: Pt will complete functional transfers/mobility during self care tasks with modified independence and good safety with use of least restrictive device  Long term goal 3: Pt will tolerate standing 8+ minutes during functional activity of choice with good safety  Long term goal 4: Pt will complete simple meal prep/light house keeping task with modified independence and good safety   Long term goal 5: Pt will verbalize/demonstrate good understanding of home safety/fall prevention to increase safety and independence with self care tasks.    Long term goals 6: Pt will demonstrated increased  strength and coordination during self care tasks as evident by and an improvement on the 9 hole peg test by 3 seconds and increased  strength by 5#        08/26/21 0906   OT Individual Minutes   Time In 6874   Time Out 0821   Minutes 46     Electronically signed by DAYANA Olguin on 8/26/21 at 9:08 AM EDT

## 2021-08-26 NOTE — PLAN OF CARE
Problem: Falls - Risk of:  Goal: Will remain free from falls  Description: Will remain free from falls  8/26/2021 1212 by Artemio Ocampo RN  Outcome: Completed  8/26/2021 0529 by Alley Rodriguez RN  Outcome: Ongoing  Goal: Absence of physical injury  Description: Absence of physical injury  8/26/2021 1212 by Artemio Ocampo RN  Outcome: Completed  8/26/2021 0529 by Alley Rodriguez RN  Outcome: Ongoing     Problem: Skin Integrity:  Goal: Will show no infection signs and symptoms  Description: Will show no infection signs and symptoms  8/26/2021 1212 by Artemio Ocampo RN  Outcome: Completed  8/26/2021 0529 by Alley Rodriguez RN  Outcome: Ongoing  Goal: Absence of new skin breakdown  Description: Absence of new skin breakdown  8/26/2021 1212 by Artemio Ocampo RN  Outcome: Completed  8/26/2021 0529 by Alley Rodriguez RN  Outcome: Ongoing     Problem: Pain:  Goal: Pain level will decrease  Description: Pain level will decrease  8/26/2021 1212 by Artemio Ocampo RN  Outcome: Completed  8/26/2021 0529 by Alley Rodriguez RN  Outcome: Ongoing  Goal: Control of acute pain  Description: Control of acute pain  8/26/2021 1212 by Artemio Ocampo RN  Outcome: Completed  8/26/2021 0529 by Alley Rodriguez RN  Outcome: Ongoing  Goal: Control of chronic pain  Description: Control of chronic pain  8/26/2021 1212 by Artemio Ocampo RN  Outcome: Completed  8/26/2021 0529 by Alley Rodriguez RN  Outcome: Ongoing     Problem: Musculor/Skeletal Functional Status  Goal: Highest potential functional level  8/26/2021 1212 by Artemio Ocampo RN  Outcome: Completed  8/26/2021 0529 by Alley Rodriguez RN  Outcome: Ongoing  Goal: Absence of falls  8/26/2021 1212 by Artemio Ocampo RN  Outcome: Completed  8/26/2021 0529 by Alley Rodriguez RN  Outcome: Ongoing     Problem: Nutrition  Goal: Optimal nutrition therapy  8/26/2021 1212 by Artemio Ocampo RN  Outcome: Completed  8/26/2021 0529 by Alley Rodriguez RN  Outcome: Ongoing     Problem:  Activity:  Goal: Fatigue will decrease  Description: Fatigue will decrease  8/26/2021 1212 by Yaneli Yao RN  Outcome: Completed  8/26/2021 0529 by Radha Hopkins RN  Outcome: Ongoing  Goal: Risk for activity intolerance will decrease  Description: Risk for activity intolerance will decrease  8/26/2021 1212 by Yaneli Yao RN  Outcome: Completed  8/26/2021 0529 by Radha Hopkins RN  Outcome: Ongoing     Problem: Fluid Volume:  Goal: Will show no signs or symptoms of fluid imbalance  Description: Will show no signs or symptoms of fluid imbalance  8/26/2021 1212 by Yaneli Yao RN  Outcome: Completed  8/26/2021 0529 by Radha Hopkins RN  Outcome: Ongoing

## 2021-08-26 NOTE — PLAN OF CARE
Problem: Falls - Risk of:  Goal: Will remain free from falls  Description: Will remain free from falls  Outcome: Ongoing  Goal: Absence of physical injury  Description: Absence of physical injury  Outcome: Ongoing     Problem: Skin Integrity:  Goal: Will show no infection signs and symptoms  Description: Will show no infection signs and symptoms  Outcome: Ongoing  Goal: Absence of new skin breakdown  Description: Absence of new skin breakdown  Outcome: Ongoing     Problem: Pain:  Goal: Pain level will decrease  Description: Pain level will decrease  Outcome: Ongoing  Goal: Control of acute pain  Description: Control of acute pain  Outcome: Ongoing  Goal: Control of chronic pain  Description: Control of chronic pain  Outcome: Ongoing     Problem: Musculor/Skeletal Functional Status  Goal: Highest potential functional level  Outcome: Ongoing  Goal: Absence of falls  Outcome: Ongoing     Problem: Nutrition  Goal: Optimal nutrition therapy  Outcome: Ongoing     Problem:  Activity:  Goal: Fatigue will decrease  Description: Fatigue will decrease  Outcome: Ongoing  Goal: Risk for activity intolerance will decrease  Description: Risk for activity intolerance will decrease  Outcome: Ongoing     Problem: Fluid Volume:  Goal: Will show no signs or symptoms of fluid imbalance  Description: Will show no signs or symptoms of fluid imbalance  Outcome: Ongoing

## 2021-08-26 NOTE — PROGRESS NOTES
Speech Language Pathology  Speech Language Pathology  [facility] Acute Rehab Unit at [facility]    Cognitive Treatment Note    Date: 8/26/2021  Patients Name: Peterson Marte  MRN: [id number]  Diagnosis:   Patient Active Problem List   Diagnosis Code    Coronary artery disease I25.10    Chronic diastolic heart failure (HCC) I50.32    Diabetic polyneuropathy associated with type 2 diabetes mellitus (HCC) E11.42    History of coronary artery bypass graft Z95.1    Iron deficiency anemia D50.9    Spinal stenosis of lumbar region with neurogenic claudication M48.062    Mixed hyperlipidemia E78.2    Stage 4 chronic kidney disease ([facility] [address] N18.4    Type 2 diabetes mellitus with kidney complication, with long-term current use of insulin ([address] E11.29, Z79.4    Syncope and collapse R55    Obesity, Class II, BMI 35-39.9 E66.9    Thyroid nodule greater than or equal to 1 cm in diameter incidentally noted on imaging study E04.1    Essential hypertension I10    Anemia in stage 4 chronic kidney disease (HCC) N18.4, D63.1    Chronic ischemic heart disease I25.9    Acute cerebrovascular accident (CVA) ([facility] [address] I63.9       Pain: pt. denies    Cognitive Treatment    Treatment time: 4898-8526    Subjective: [x] Alert [x] Cooperative     [] Confused     [] Agitated    [] Lethargic    Objective/Assessment:  Attention: Functional throughout    Recall: n/a     Problem Solving/Reasoning:  Pt. Completed 6 step ADL written sequencing c very min A. Pt. Completed needed max A to complete deductive reasoning puzzle. Other:         Plan:  [x] Continue ST services    [] Discharge from ST:      Discharge recommendations: [] Inpatient Rehab   [] [place]   [] Outpatient Therapy  [] Follow up at trauma clinic   [] Other:       Treatment completed by: Deric Chavez A.CCC/SLP

## 2021-08-26 NOTE — PROGRESS NOTES
CLINICAL PHARMACY NOTE: MEDS TO BEDS    Total # of Prescriptions Filled: 13   The following medications were delivered to the patient:  · Gabapentin 300mg  · Buspirone HCL 7.5mg  · Ranolazine ER 1000mg  · Carvedilol 6.25mg  · Eliquis 5mg  · Erleen Bitter 100unit/ml  · Tamsulosin HCL 0.4mg  · Pantoprazole Sodium 40mg  · Furosemide 80mg  · B-D UF Pen Needle 3/16\" 31G (5mm)  · Trazodone HCL 100mg  · Humalog Kwikpen 100/ml  · Atorvastatin Calcium 80mg    Additional Documentation:  Delivered Medication to Nurses Station     ASA 81 Chew not covered - profiled rx  Uloric 40mg out of stock - transferred to home pharmacy ([address] on [facility] [facility])

## 2021-08-26 NOTE — PROGRESS NOTES
Physical Therapy  Facility/Department: [facility] ACUTE REHAB  Daily Treatment Note  NAME: Eufemia Lepe  : 1960  MRN: [id number]    Date of Service: 2021    Discharge Recommendations:  Patient would benefit from continued therapy after discharge, Home with assist PRN   PT Equipment Recommendations  Equipment Needed: No    Assessment   Body structures, Functions, Activity limitations: Decreased functional mobility ; Decreased strength;Decreased endurance;Decreased balance; Increased pain  Treatment Diagnosis: CVA  Specific instructions for Next Treatment: Balance training, transfer training, gait training  REQUIRES PT FOLLOW UP: Yes  Activity Tolerance  Activity Tolerance: Patient Tolerated treatment well;Patient limited by endurance; Patient limited by fatigue     Patient Diagnosis(es): Diagnoses of Coronary artery disease involving native coronary artery of native heart without angina pectoris, Spinal stenosis of lumbar region with neurogenic claudication, Chronic midline low back pain with bilateral sciatica, Anxiety and depression, Mixed hyperlipidemia, Chronic fatigue, Other iron deficiency anemia, and Type 2 diabetes 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, with long-term current use of insulin ([facility] [address] were pertinent to this visit. has a past medical history of Backache, unspecified, CHF (congestive heart failure) ([address] Chronic kidney disease, Coronary atherosclerosis of artery bypass graft, Cramp of limb, Gallstones, Hypertension, Insomnia, Pneumonia, Type II or unspecified type diabetes mellitus with renal manifestations, not stated as uncontrolled(250.40), Type II or unspecified type diabetes mellitus without mention of complication, not stated as uncontrolled, and Unspecified vitamin D deficiency. has a past surgical history that includes Coronary artery bypass graft; Knee arthroscopy; Carpal tunnel release; Breast surgery; Tonsillectomy; Hand surgery;  Ankle fracture surgery; Cholecystectomy, open (N/A); and IR TUNNELED CVC PLACE WO SQ PORT/PUMP > 5 YEARS (8/18/2021). Restrictions  Restrictions/Precautions  Restrictions/Precautions: Fall Risk  Required Braces or Orthoses?: Yes (B lymphedema wraps. )  Required Braces or Orthoses  Right Lower Extremity Brace:  (Lymphedema brace )  Left Lower Extremity Brace:  (Lymphedema brace )  Subjective   General  Chart Reviewed: Yes  Additional Pertinent Hx: Presented with acute right arm weakness and mental status change. MRI brain showed probable acute to subacute lacunar infarct of the left frontal lobe. AURELIA on CKD stage 4 likely d/t contrast induced nephropathy. Dialysis was initiated on 8/6/21, had 2 treatment session, labs improving. Pt. has history of diabetes mellitus type II, CAD s/p CABG (2005), cervical stenosis, back pain, lymphedema, diastolic CHF, DVT, urinary retention, and iron deficiency anemia. Pt. admitted to ARU from [place] [address] 8/11/21. Response To Previous Treatment: Patient with no complaints from previous session. Family / Caregiver Present: No  Referring Practitioner: Dr. Neris Briceño: Patient eager and motivated for therapy. Expressing gratitude and stating how happy she is with her progress   Pain Screening  Patient Currently in Pain: Yes  Pain Assessment  Pain Assessment: 0-10  Pain Level: 4  Pain Type: Acute pain  Pain Location: Neck  Vital Signs  Patient Currently in Pain: Yes       Orientation  Orientation  Overall Orientation Status: Within Normal Limits  Objective      Transfers  Sit to Stand: Modified independent  Stand to sit: Modified independent  Bed to Chair: Modified independent  Stand Pivot Transfers: Modified independent  Comment: Transfers assessed with rollator. Steady, no LOB noted  Ambulation  Ambulation?: Yes  Ambulation 1  Surface: level tile  Device: Rollator  Assistance: Modified Independent  Quality of Gait: Required seated break on rollator post 100 ft.   No LOB  Gait training  Current Treatment Recommendations: Strengthening, Balance Training, Transfer Training, Functional Mobility Training, ROM, Endurance Training, Gait Training, Stair training, Pain Management, Home Exercise Program, Safety Education & Training, Neuromuscular Re-education, Patient/Caregiver Education & Training, Equipment Evaluation, Education, & procurement  Safety Devices  Type of devices:  All fall risk precautions in place, Call light within reach, Gait belt, Patient at risk for falls, Nurse notified     Therapy Time     08/26/21 1121   PT Individual Minutes   Time In 8287   Time Out 1119   Minutes 36   PT Concurrent Minutes   Time In 1026   Time Out 7640   Minutes 17     Electronically signed by Alfred Ragsdale PTA on 8/26/21 at 11:29 AM EDT

## 2021-08-26 NOTE — DISCHARGE SUMMARY
Physical Medicine & Rehabilitation  Discharge Summary     Patient Identification:  Tanvi Evans  : 1960  Admit date: 2021  Discharge date: 21  Attending provider: Ana Cristina Ballard MD        Primary care provider: AFSANEH Monzon - CNP     Discharge Diagnoses:   Left frontal ischemic CVA  Mild right hemiparesis  Right wrist/hand tremors  HTN  CAD  Diastolic CHF  Insomnia  Anxiety  Diabetes with neuropathy  AURELIA on CKD IV  Anemia of chronic disease  Chronic neck and back pain  Urinary retention - improved    Discharge Functional Status:    Physical therapy:  Bed Mobility: Rolling: Modified independent  Supine to Sit: Modified independent  Sit to Supine: Modified independent  Scooting: Modified independent  Transfers: Sit to Stand: Modified independent  Stand to sit: Modified independent  Bed to Chair: Modified independent  Comment: rest breaks PRN. , Ambulation 1  Surface: level tile  Device: Rollator  Assistance: Modified Independent  Quality of Gait: Required seated break on rollator post 100 ft. No LOB  Gait Deviations: Slow Annie, Decreased step height, Decreased step length, Increased ELISABET  Distance: 100ft x2  Comments: patient ambulated to and from therapy gym with 1 seated rest break during distances , Stairs  # Steps : 5 (x3)  Stairs Height:  (4\"/6\")  Rails: Bilateral  Device: No Device  Assistance: Modified independent   Comment: Patient completed steps in a reciprocal pattern. Mobility:  , PT Equipment Recommendations  Equipment Needed: No, Assessment: Pt. presents after a CVA with decreased strength and balance, limiting functional mobility. Pt. with history of diabetic neuropathy and lymphedema. Pt. SBA for bed mobility and CGA for transfers using a rolling walker. Pt. ambulated 45ft. in 45s with CGA using a rolling walker, then 100ft. in 1:50 minutes with CGA using a rolling walker, then 148ft. in 2 minutes using a rollator.  Pt. limited by upper and lower back pain and decreased endurance. Pt. displayed shortness of breath upon exertion. Occupational therapy:  , Equipment Recommendations  Equipment Needed: Yes  Reacher: x  Sock Aid: x,      Speech therapy:  Comprehension: 6 - Complex ideas 90% or device (hearing aid or glasses- if patient is primarily a visual learner)  Expression: 6 - Device used to express complex ideas/needs  Social Interaction: 7 - Patient has appropriate behavior/relations 100% of the time  Problem Solvin - Patient able to solve simple/routine tasks  Memory: 6 - Patient requires device to recall (e.g. memory book)      Inpatient Rehabilitation Course:   Eufemia Lepe is a 59 y.o. female admitted to inpatient rehabilitation on 2021 for rehab for CVA. She was originally admitted to [facility] on 2021 for acute right upper limb weakness and mental status change. Initial CT and CT perfusion scans were WNL on 21. MRI showed acute lacunar infarct of left frontal lobe. She was treated medically. Nephrology followed for AURELIA on CKD. Her Bumex was held and she was placed on amiodarone. She received 3 days hemodialysis then had clinical improvement and it was discontinued. She was seeing Dr. Jamar Bermudez as outpatient for CKD.     She was requiring assistance for self-care activities and mobility prompting admission to acute rehab. Rehab course: The patient participated in an aggressive multidisciplinary inpatient rehabilitation program involving 3 hours per day, 5 days per week of rehabilitation. Patient benefited from inpatient rehab and was discharged in good stable condition. She was maintained aspirin and atorvastatin for secondary stroke prevention. Trazodone was increased for insomnia. Tunneled catheter was placed 21 for continuation of hemodialysis. Chronic conditions were otherwise stable on medications. Patient evaluated today:  GEN: Well developed, well nourished, no acute distress  HEENT: NCAT.   EOM grossly intact. Hearing grossly intact. Mucous membranes pink and moist.  RESP: Normal breath sounds with no wheezing, rales, or rhonchi. Respirations WNL and unlabored. CV: Regular rate and rhythm. No murmurs, rubs, or gallops. ABD: Soft, non-distended, BS+ and equal.  NEURO: Alert. Speech fluent. Sensation to light touch decreased in distal bilateral lower limbs. MSK:  Muscle bulk is normal bilaterally. Strength 5-/5 in right upper limb. Strength 5/5 in left lower limb and bilateral lower limbs. No tremors noted in right upper limb at the time of evaluation. LIMBS:  Edema present in bilateral lower limbs - stable (has lymphedema wraps in place). SKIN: Warm and dry with good turgor. PSYCH: Mood WNL. Affect WNL. Appropriately interactive. Consults:   nephrology and internal medicine    Significant Diagnostics:   CBC:   Lab Results   Component Value Date    WBC 4.4 08/25/2021    RBC 3.41 08/25/2021    HGB 10.8 08/25/2021    HCT 33.3 08/25/2021    MCV 97.5 08/25/2021    MCH 31.8 08/25/2021    MCHC 32.6 08/25/2021    RDW 16.6 08/25/2021     08/25/2021    MPV 7.8 08/25/2021     BMP:    Lab Results   Component Value Date     08/25/2021    K 4.2 08/25/2021    CL 99 08/25/2021    CO2 27 08/25/2021    BUN 35 08/25/2021    LABALBU 3.5 05/26/2021    CREATININE 3.79 08/25/2021    CALCIUM 9.5 08/25/2021    GFRAA 15 08/25/2021    LABGLOM 12 08/25/2021    GLUCOSE 113 08/25/2021         IR TUNNELED CVC PLACE WO SQ PORT/PUMP > 5 YEARS   Final Result   Successful ultrasound and fluoroscopy guided tunneled dialysis catheter   placement. The catheter may be used immediately. Temporary dialysis   catheter removed .                Patient Instructions:    No driving until cleared by a physician    Medications, precautions and follow up reviewed with patient and family    Follow-up visits: See after visit summary from hospitalization  Follow up PCP 8/31/21, Dr. Brandt Mccloud 9/23/21, Nephrology,  [facility] 9/30/21       Disposition:  Home with home health      Discharge Medications:   Radha Chappell Dr Medication Instructions BEZ:[id number]    Printed on:08/27/21 0002   Medication Information                      acetaminophen (TYLENOL) 325 MG tablet  Take 2 tablets by mouth every 4 hours as needed for Pain             Alcohol Swabs 70 % PADS  Use an alcohol swab to cleanse the skin before checking your blood sugar and before each insulin injection. aspirin 81 MG chewable tablet  Take 1 tablet by mouth daily             atorvastatin (LIPITOR) 80 MG tablet  Take 1 tablet by mouth daily             blood glucose test strips (DEN CONTOUR TEST) strip  1 each by In Vitro route 3 times daily             busPIRone (BUSPAR) 7.5 MG tablet  Take 1 tablet by mouth 3 times daily             carvedilol (COREG) 6.25 MG tablet  Take 1 tablet by mouth 2 times daily             ELIQUIS 5 MG TABS tablet  Take 1 tablet by mouth 2 times daily             febuxostat (ULORIC) 40 MG TABS tablet  Take 1 tablet by mouth daily             ferrous sulfate (BRIAN-ARCHIE) 325 (65 Fe) MG tablet  Take 1 tablet by mouth 2 times daily (with meals)             furosemide (LASIX) 80 MG tablet  Take 1 tablet by mouth 2 times daily             gabapentin (NEURONTIN) 300 MG capsule  Take 1 capsule by mouth 2 times daily for 30 days. insulin aspart (NOVOLOG FLEXPEN) 100 UNIT/ML injection pen  Sliding scale three times daily with meals as follows:  Glucose <139  No Insulin; 140-199  2 Units; 200-249  4 Units; 250-299  6 Units; 300-349  8 Units; 350-400  10 Units; Above 400  12 Units             insulin glargine (BASAGLAR KWIKPEN) 100 UNIT/ML injection pen  Inject 53 Units into the skin 2 times daily             Insulin Pen Needle (BD ULTRA-FINE PEN NEEDLES) 29G X 12.7MM MISC  Use one needle for each insulin injection. Lancets 28G MISC  Inject 1 each into the skin 3 times daily.              pantoprazole (PROTONIX) 40 MG tablet  Take 1 tablet by mouth every morning (before breakfast)             polyethylene glycol (GLYCOLAX) 17 g packet  Take 17 g by mouth daily as needed for Constipation             ranolazine (RANEXA) 1000 MG extended release tablet  Take 1 tablet by mouth 2 times daily             senna (SENOKOT) 8.6 MG tablet  Take 2 tablets by mouth daily as needed (Constipation)             sharps container  1 each by Does not apply route as needed (dirty pen needles)             tamsulosin (FLOMAX) 0.4 MG capsule  Take 1 capsule by mouth daily             traZODone (DESYREL) 100 MG tablet  Take 1 tablet by mouth nightly                   Genevieve Betts MD

## 2021-08-26 NOTE — PROGRESS NOTES
NEPHROLOGY PROGRESS NOTE    Patient :  Ludwig Ovalle; 59 y.o. MRN# [id number]  Location:  2617/2617-01  Attending:  Lizett Albert MD  Admit Date:  8/11/2021   Hospital Day: 13    Reason for consultation: Management of acute kidney injury superimposed on chronic kidney disease stage IV. Consulting physician: Randall Pastor MD.    Interval history: Patient was seen and examined today and she does not have shortness of breath at rest.   BP stable. Had dialysis yesterday. S/P right IJ tunneled catheter placed. Patient denies abdominal pain, nausea or vomiting. History of Present Illness: This is a 59 y.o. female with past medical history of longstanding type 2 diabetes insulin-dependent diabetes mellitus, essential hypertension, coronary artery disease status post CABG in 2004, coronary artery stent in 2019, CHF with EF of 55%, history of mild to moderate LVH diastolic dysfunction, CKD 4 with baseline creatinine of about 2.2 to 2.4 mg/dL, patient initially presented to [facility] on 8/1/2021 with right-sided weakness, sudden onset patient was diagnosed with acute/subacute stroke in left frontal lobe with right-sided weakness, patient had CT angiogram during that. And developed acute kidney injury due to contrast-induced nephropathy requiring dialysis serum creatinine peaked to 4.9 mg/dL and patient was started on dialysis on 6 August patient had dialysis on 7 August, and dialysis was held follow kidney function renal recovery and subsequently patient was transferred to acute rehab on 10 August.  Nephrology is consulted for ongoing management of  acute kidney injury on CKD. Patient is feeling better weakness slightly improved patient still have peripheral edema patient is on diuretics most recent serum creatinine is 2.9 mg/dL, BUN 57  Urine output is not measured.   During hospital stay at [facility] patient did have urinary retention and had Pantoja catheter which was removed 2 days ago.  Denies any urinary complaints. Medication review showed use of  ARB's and diuretics.     Current Medications:    insulin glargine (LANTUS) injection vial 53 Units, BID  traZODone (DESYREL) tablet 100 mg, Nightly  sodium chloride (OCEAN, BABY AYR) 0.65 % nasal spray 1 spray, PRN  acetaminophen (TYLENOL) tablet 650 mg, Q4H PRN  polyethylene glycol (GLYCOLAX) packet 17 g, Daily  senna (SENOKOT) tablet 17.2 mg, Daily PRN  bisacodyl (DULCOLAX) suppository 10 mg, Daily PRN  febuxostat (ULORIC) tablet 40 mg, Daily  tamsulosin (FLOMAX) capsule 0.4 mg, Daily  gabapentin (NEURONTIN) capsule 300 mg, BID  furosemide (LASIX) tablet 80 mg, BID  pantoprazole (PROTONIX) tablet 40 mg, QAM AC  ranolazine (RANEXA) extended release tablet 1,000 mg, BID  insulin lispro (HUMALOG) injection vial 0-12 Units, TID WC  insulin lispro (HUMALOG) injection vial 0-6 Units, Nightly  glucose (GLUTOSE) 40 % oral gel 15 g, PRN  dextrose 50 % IV solution, PRN  glucagon (rDNA) injection 1 mg, PRN  dextrose 5 % solution, PRN  apixaban (ELIQUIS) tablet 5 mg, BID  aspirin chewable tablet 81 mg, Daily  atorvastatin (LIPITOR) tablet 80 mg, Nightly  busPIRone (BUSPAR) tablet 7.5 mg, TID  carvedilol (COREG) tablet 6.25 mg, BID WC  ferrous sulfate (IRON 325) tablet 325 mg, BID       Objective:  CURRENT TEMPERATURE:  Temp: 98.2 °F (36.8 °C)  MAXIMUM TEMPERATURE OVER 24HRS:  Temp (24hrs), Av °F (36.7 °C), Min:97.6 °F (36.4 °C), Max:98.4 °F (36.9 °C)    CURRENT RESPIRATORY RATE:  Resp: 16  CURRENT PULSE:  Pulse: 61  CURRENT BLOOD PRESSURE:  BP: (!) 124/58  24HR BLOOD PRESSURE RANGE:  Systolic (41TVA), HCB:713 , Min:82 , TVO:420   ; Diastolic (49JGP), KPH:79, Min:43, Max:67    24HR INTAKE/OUTPUT:      Intake/Output Summary (Last 24 hours) at 2021 1138  Last data filed at 2021 1844  Gross per 24 hour   Intake --   Output 2700 ml   Net -2700 ml     Patient Vitals for the past 96 hrs (Last 3 readings):   Weight   21 1844 233 lb 11 oz (106 kg)   08/25/21 1448 238 lb 15.7 oz (108.4 kg)   08/25/21 1445 228 lb 2.8 oz (103.5 kg)     Physical Exam:  GENERAL APPEARANCE: Alert and cooperative, and appears to be in no acute distress. HEAD: normocephalic  CARDIAC: Normal S1 and S2. No S3, S4 or murmurs. Rhythm is regular. LUNGS: Clear to auscultation and percussion without rales, rhonchi, wheezing or diminished breath sounds. ABDOMEN: Soft with normal bowel sounds; no palpable organomegaly. MUSKULOSKELETAL: Adequately aligned spine. No joint erythema or tenderness. EXTREMITIES: 2+ bilateral lower extremity edema. Peripheral pulses intact. NEURO: Right-sided weakness    Labs:    BMP:   Recent Labs     08/25/21  0714      K 4.2   CL 99   CO2 27   BUN 35*   CREATININE 3.79*   GLUCOSE 113*   CALCIUM 9.5      Assessment/plan:    1. Acute kidney injury superimposed on chronic kidney disease stage IV differentials include contrast mediated acute tubular necrosis secondary to recent CT angiogram at [facility] and progression of underlying chronic kidney disease. She started acute hemodialysis at [facility] on 8/6/2021. Patient has ongoing indications for intermittent hemodialysis. 2.  Systemic hypertension - blood pressure control is adequate. 3.  S/p recent left frontal ischemic stroke with right-sided weakness. Continue physical therapy exercises. 4.  Peripheral edema - continue furosemide 80 mg p.o. twice daily. Plan:    Continue  Monday/wedensday/friday schedule  S/P tunneled hemodialysis catheter placed on 8/18/21  Continue to monitor GFR and urine output closely for evidence of renal function. Good UOP but Scr remains above 3. Most likely patient will need ongoing chronic dialysis. Prognosis is guarded.     Electronically signed by Benjy Anderson MD on 8/26/2021 at 11:38 AM

## 2021-09-13 ENCOUNTER — APPOINTMENT (OUTPATIENT)
Dept: MRI IMAGING | Age: 61
DRG: 312 | End: 2021-09-13
Payer: COMMERCIAL

## 2021-09-13 ENCOUNTER — CLINICAL DOCUMENTATION (OUTPATIENT)
Dept: PHYSICAL THERAPY | Age: 61
End: 2021-09-13

## 2021-09-13 ENCOUNTER — APPOINTMENT (OUTPATIENT)
Dept: CT IMAGING | Age: 61
DRG: 312 | End: 2021-09-13
Payer: COMMERCIAL

## 2021-09-13 ENCOUNTER — HOSPITAL ENCOUNTER (INPATIENT)
Age: 61
LOS: 4 days | Discharge: NURSING, REHAB OR LONG-TERM CARE | DRG: 312 | End: 2021-09-17
Attending: EMERGENCY MEDICINE | Admitting: FAMILY MEDICINE
Payer: COMMERCIAL

## 2021-09-13 DIAGNOSIS — G93.40 ACUTE ENCEPHALOPATHY: Primary | ICD-10-CM

## 2021-09-13 PROBLEM — R29.90 STROKE-LIKE SYMPTOMS: Status: ACTIVE | Noted: 2021-09-13

## 2021-09-13 LAB
ABSOLUTE EOS #: 0.2 K/UL (ref 0–0.4)
ABSOLUTE IMMATURE GRANULOCYTE: ABNORMAL K/UL (ref 0–0.3)
ABSOLUTE LYMPH #: 1.5 K/UL (ref 1–4.8)
ABSOLUTE MONO #: 0.4 K/UL (ref 0.1–1.3)
ALBUMIN SERPL-MCNC: 4 G/DL (ref 3.5–5.2)
ALBUMIN/GLOBULIN RATIO: ABNORMAL (ref 1–2.5)
ALP BLD-CCNC: 118 U/L (ref 35–104)
ALT SERPL-CCNC: 14 U/L (ref 5–33)
ANION GAP SERPL CALCULATED.3IONS-SCNC: 13 MMOL/L (ref 9–17)
AST SERPL-CCNC: 19 U/L
BASOPHILS # BLD: 1 % (ref 0–2)
BASOPHILS ABSOLUTE: 0.1 K/UL (ref 0–0.2)
BILIRUB SERPL-MCNC: 0.48 MG/DL (ref 0.3–1.2)
BUN BLDV-MCNC: 22 MG/DL (ref 8–23)
BUN/CREAT BLD: ABNORMAL (ref 9–20)
CALCIUM SERPL-MCNC: 9.1 MG/DL (ref 8.6–10.4)
CHLORIDE BLD-SCNC: 100 MMOL/L (ref 98–107)
CO2: 25 MMOL/L (ref 20–31)
CREAT SERPL-MCNC: 1.77 MG/DL (ref 0.5–0.9)
DIFFERENTIAL TYPE: ABNORMAL
EOSINOPHILS RELATIVE PERCENT: 3 % (ref 0–4)
GFR AFRICAN AMERICAN: 35 ML/MIN
GFR NON-AFRICAN AMERICAN: 26 ML/MIN
GFR NON-AFRICAN AMERICAN: 29 ML/MIN
GFR SERPL CREATININE-BSD FRML MDRD: 32 ML/MIN
GFR SERPL CREATININE-BSD FRML MDRD: ABNORMAL ML/MIN/{1.73_M2}
GLUCOSE BLD-MCNC: 154 MG/DL (ref 65–105)
GLUCOSE BLD-MCNC: 174 MG/DL (ref 70–99)
HCT VFR BLD CALC: 37.7 % (ref 36–46)
HEMOGLOBIN: 12.8 G/DL (ref 12–16)
IMMATURE GRANULOCYTES: ABNORMAL %
INR BLD: 1.2
LYMPHOCYTES # BLD: 23 % (ref 24–44)
MCH RBC QN AUTO: 32.2 PG (ref 26–34)
MCHC RBC AUTO-ENTMCNC: 33.9 G/DL (ref 31–37)
MCV RBC AUTO: 94.9 FL (ref 80–100)
MONOCYTES # BLD: 6 % (ref 1–7)
NRBC AUTOMATED: ABNORMAL PER 100 WBC
PDW BLD-RTO: 14.9 % (ref 11.5–14.9)
PLATELET # BLD: 185 K/UL (ref 150–450)
PLATELET ESTIMATE: ABNORMAL
PMV BLD AUTO: 8.3 FL (ref 6–12)
POC CREATININE: 1.94 MG/DL (ref 0.51–1.19)
POTASSIUM SERPL-SCNC: 3.8 MMOL/L (ref 3.7–5.3)
PROTHROMBIN TIME: 15.5 SEC (ref 11.8–14.6)
RBC # BLD: 3.97 M/UL (ref 4–5.2)
RBC # BLD: ABNORMAL 10*6/UL
SEG NEUTROPHILS: 67 % (ref 36–66)
SEGMENTED NEUTROPHILS ABSOLUTE COUNT: 4.4 K/UL (ref 1.3–9.1)
SODIUM BLD-SCNC: 138 MMOL/L (ref 135–144)
TOTAL PROTEIN: 7.5 G/DL (ref 6.4–8.3)
TROPONIN INTERP: ABNORMAL
TROPONIN INTERP: ABNORMAL
TROPONIN T: ABNORMAL NG/ML
TROPONIN T: ABNORMAL NG/ML
TROPONIN, HIGH SENSITIVITY: 58 NG/L (ref 0–14)
TROPONIN, HIGH SENSITIVITY: 67 NG/L (ref 0–14)
WBC # BLD: 6.5 K/UL (ref 3.5–11)
WBC # BLD: ABNORMAL 10*3/UL

## 2021-09-13 PROCEDURE — 51701 INSERT BLADDER CATHETER: CPT

## 2021-09-13 PROCEDURE — 80053 COMPREHEN METABOLIC PANEL: CPT

## 2021-09-13 PROCEDURE — 70450 CT HEAD/BRAIN W/O DYE: CPT

## 2021-09-13 PROCEDURE — 82565 ASSAY OF CREATININE: CPT

## 2021-09-13 PROCEDURE — 82947 ASSAY GLUCOSE BLOOD QUANT: CPT

## 2021-09-13 PROCEDURE — 93005 ELECTROCARDIOGRAM TRACING: CPT | Performed by: EMERGENCY MEDICINE

## 2021-09-13 PROCEDURE — 70544 MR ANGIOGRAPHY HEAD W/O DYE: CPT

## 2021-09-13 PROCEDURE — 85025 COMPLETE CBC W/AUTO DIFF WBC: CPT

## 2021-09-13 PROCEDURE — 36415 COLL VENOUS BLD VENIPUNCTURE: CPT

## 2021-09-13 PROCEDURE — 70547 MR ANGIOGRAPHY NECK W/O DYE: CPT

## 2021-09-13 PROCEDURE — 2580000003 HC RX 258: Performed by: EMERGENCY MEDICINE

## 2021-09-13 PROCEDURE — 85610 PROTHROMBIN TIME: CPT

## 2021-09-13 PROCEDURE — 2060000000 HC ICU INTERMEDIATE R&B

## 2021-09-13 PROCEDURE — 99448 NTRPROF PH1/NTRNET/EHR 21-30: CPT | Performed by: PSYCHIATRY & NEUROLOGY

## 2021-09-13 PROCEDURE — 84484 ASSAY OF TROPONIN QUANT: CPT

## 2021-09-13 PROCEDURE — 70551 MRI BRAIN STEM W/O DYE: CPT

## 2021-09-13 PROCEDURE — 99285 EMERGENCY DEPT VISIT HI MDM: CPT

## 2021-09-13 PROCEDURE — 02HV33Z INSERTION OF INFUSION DEVICE INTO SUPERIOR VENA CAVA, PERCUTANEOUS APPROACH: ICD-10-PCS | Performed by: FAMILY MEDICINE

## 2021-09-13 RX ORDER — GABAPENTIN 300 MG/1
300 CAPSULE ORAL 2 TIMES DAILY
Status: DISCONTINUED | OUTPATIENT
Start: 2021-09-13 | End: 2021-09-17 | Stop reason: HOSPADM

## 2021-09-13 RX ORDER — CARVEDILOL 6.25 MG/1
6.25 TABLET ORAL 2 TIMES DAILY
Status: DISCONTINUED | OUTPATIENT
Start: 2021-09-13 | End: 2021-09-17 | Stop reason: HOSPADM

## 2021-09-13 RX ORDER — POLYETHYLENE GLYCOL 3350 17 G/17G
17 POWDER, FOR SOLUTION ORAL DAILY PRN
Status: DISCONTINUED | OUTPATIENT
Start: 2021-09-13 | End: 2021-09-17 | Stop reason: HOSPADM

## 2021-09-13 RX ORDER — INSULIN LISPRO 100 [IU]/ML
INJECTION, SOLUTION INTRAVENOUS; SUBCUTANEOUS
Status: ON HOLD | COMMUNITY
End: 2021-09-25 | Stop reason: SDUPTHER

## 2021-09-13 RX ORDER — BUSPIRONE HYDROCHLORIDE 5 MG/1
7.5 TABLET ORAL 3 TIMES DAILY
Status: DISCONTINUED | OUTPATIENT
Start: 2021-09-13 | End: 2021-09-17 | Stop reason: HOSPADM

## 2021-09-13 RX ORDER — FERROUS SULFATE 325(65) MG
325 TABLET ORAL 2 TIMES DAILY WITH MEALS
Status: DISCONTINUED | OUTPATIENT
Start: 2021-09-14 | End: 2021-09-17 | Stop reason: HOSPADM

## 2021-09-13 RX ORDER — ASPIRIN 81 MG/1
81 TABLET, CHEWABLE ORAL DAILY
Status: DISCONTINUED | OUTPATIENT
Start: 2021-09-14 | End: 2021-09-17 | Stop reason: HOSPADM

## 2021-09-13 RX ORDER — SODIUM CHLORIDE 0.9 % (FLUSH) 0.9 %
5-40 SYRINGE (ML) INJECTION EVERY 12 HOURS SCHEDULED
Status: DISCONTINUED | OUTPATIENT
Start: 2021-09-13 | End: 2021-09-17 | Stop reason: HOSPADM

## 2021-09-13 RX ORDER — FUROSEMIDE 40 MG/1
80 TABLET ORAL 2 TIMES DAILY
Status: DISCONTINUED | OUTPATIENT
Start: 2021-09-14 | End: 2021-09-17 | Stop reason: HOSPADM

## 2021-09-13 RX ORDER — FEBUXOSTAT 40 MG/1
40 TABLET, FILM COATED ORAL DAILY
Status: DISCONTINUED | OUTPATIENT
Start: 2021-09-14 | End: 2021-09-17 | Stop reason: HOSPADM

## 2021-09-13 RX ORDER — DEXTROSE MONOHYDRATE 50 MG/ML
100 INJECTION, SOLUTION INTRAVENOUS PRN
Status: DISCONTINUED | OUTPATIENT
Start: 2021-09-13 | End: 2021-09-17 | Stop reason: HOSPADM

## 2021-09-13 RX ORDER — ONDANSETRON 2 MG/ML
4 INJECTION INTRAMUSCULAR; INTRAVENOUS EVERY 6 HOURS PRN
Status: DISCONTINUED | OUTPATIENT
Start: 2021-09-13 | End: 2021-09-17 | Stop reason: HOSPADM

## 2021-09-13 RX ORDER — PANTOPRAZOLE SODIUM 40 MG/1
40 TABLET, DELAYED RELEASE ORAL
Status: DISCONTINUED | OUTPATIENT
Start: 2021-09-14 | End: 2021-09-17 | Stop reason: HOSPADM

## 2021-09-13 RX ORDER — ACETAMINOPHEN 325 MG/1
650 TABLET ORAL EVERY 4 HOURS PRN
Status: DISCONTINUED | OUTPATIENT
Start: 2021-09-13 | End: 2021-09-17 | Stop reason: HOSPADM

## 2021-09-13 RX ORDER — SENNA PLUS 8.6 MG/1
2 TABLET ORAL DAILY PRN
Status: DISCONTINUED | OUTPATIENT
Start: 2021-09-13 | End: 2021-09-17 | Stop reason: HOSPADM

## 2021-09-13 RX ORDER — SODIUM CHLORIDE 9 MG/ML
INJECTION, SOLUTION INTRAVENOUS CONTINUOUS
Status: DISCONTINUED | OUTPATIENT
Start: 2021-09-13 | End: 2021-09-16

## 2021-09-13 RX ORDER — TRAZODONE HYDROCHLORIDE 50 MG/1
75 TABLET ORAL NIGHTLY
Status: DISCONTINUED | OUTPATIENT
Start: 2021-09-13 | End: 2021-09-17 | Stop reason: HOSPADM

## 2021-09-13 RX ORDER — TAMSULOSIN HYDROCHLORIDE 0.4 MG/1
0.4 CAPSULE ORAL DAILY
Status: DISCONTINUED | OUTPATIENT
Start: 2021-09-14 | End: 2021-09-17 | Stop reason: HOSPADM

## 2021-09-13 RX ORDER — SODIUM CHLORIDE 0.9 % (FLUSH) 0.9 %
5-40 SYRINGE (ML) INJECTION PRN
Status: DISCONTINUED | OUTPATIENT
Start: 2021-09-13 | End: 2021-09-17 | Stop reason: HOSPADM

## 2021-09-13 RX ORDER — TRAZODONE HYDROCHLORIDE 50 MG/1
75 TABLET ORAL NIGHTLY
Status: ON HOLD | COMMUNITY
End: 2021-09-25 | Stop reason: SDUPTHER

## 2021-09-13 RX ORDER — NICOTINE POLACRILEX 4 MG
15 LOZENGE BUCCAL PRN
Status: DISCONTINUED | OUTPATIENT
Start: 2021-09-13 | End: 2021-09-17 | Stop reason: HOSPADM

## 2021-09-13 RX ORDER — RANOLAZINE 500 MG/1
1000 TABLET, EXTENDED RELEASE ORAL 2 TIMES DAILY
Status: DISCONTINUED | OUTPATIENT
Start: 2021-09-13 | End: 2021-09-17 | Stop reason: HOSPADM

## 2021-09-13 RX ORDER — DEXTROSE MONOHYDRATE 25 G/50ML
12.5 INJECTION, SOLUTION INTRAVENOUS PRN
Status: DISCONTINUED | OUTPATIENT
Start: 2021-09-13 | End: 2021-09-17 | Stop reason: HOSPADM

## 2021-09-13 RX ORDER — INSULIN GLARGINE 100 [IU]/ML
53 INJECTION, SOLUTION SUBCUTANEOUS 2 TIMES DAILY
Status: DISCONTINUED | OUTPATIENT
Start: 2021-09-13 | End: 2021-09-17 | Stop reason: HOSPADM

## 2021-09-13 RX ORDER — ATORVASTATIN CALCIUM 80 MG/1
80 TABLET, FILM COATED ORAL DAILY
Status: DISCONTINUED | OUTPATIENT
Start: 2021-09-14 | End: 2021-09-17 | Stop reason: HOSPADM

## 2021-09-13 RX ORDER — ONDANSETRON 4 MG/1
4 TABLET, ORALLY DISINTEGRATING ORAL EVERY 8 HOURS PRN
Status: DISCONTINUED | OUTPATIENT
Start: 2021-09-13 | End: 2021-09-17 | Stop reason: HOSPADM

## 2021-09-13 RX ORDER — SODIUM CHLORIDE 9 MG/ML
25 INJECTION, SOLUTION INTRAVENOUS PRN
Status: DISCONTINUED | OUTPATIENT
Start: 2021-09-13 | End: 2021-09-17 | Stop reason: HOSPADM

## 2021-09-13 RX ADMIN — SODIUM CHLORIDE: 9 INJECTION, SOLUTION INTRAVENOUS at 16:16

## 2021-09-13 NOTE — PROGRESS NOTES
Medication History completed:    New medications: Humalog    Medications discontinued: Novolog    Changes to dosing:   Trazodone changed to 75 mg (one and one half 50 mg tablets) nightly    Stated allergies: As listed    Other pertinent information: Medications confirmed with [facility].      Thank you,  Lavern Chand, PharmD, BCPS  [phone]

## 2021-09-13 NOTE — ED TRIAGE NOTES
Patient to ed from dialysis with c/o change in mentation. Per medic patient has a change in mentation with about 10 min left of dialysis. Medics states that she did not know her sisters name. Patient can not remember her name at this time and has loss of feeling in her right side.

## 2021-09-13 NOTE — DISCHARGE SUMMARY
[] [facility] ([facility]) @ [facility]  [address] [place] [address], [address] [facility]  Phone [phone]  Fax [phone]    Physical Therapy Discharge Note    Date: 2021      Patient: Ranjith Hill  : 1960  MRN: [id number]    Kevon Tapia CNP                                  Insurance: [employer] [place]- 40 visit limit PT/OT combined (18 used per count )   Medical Diagnosis: Right /left leg pain                Rehab Codes: M79.604,M79.605, M25.532  Onset Date:  11/15/2018    Next 's appt.: 21  Visit Count:                 Cancel/No Show: 4/0    Date of initial visit: 2021               Date of last PN: 6/3/21 & 21    Pt last seen 21 for re-assessment. PT was held at that time for pt to receive injections. She was to return after injections if ordered/approved by MD. No follow up has been scheduled at this time. Please see note from 21 for last status.              [] Patient recovered from conditions. Treatment goals were met. [x] Patient received maximum benefit. No further therapy indicated at this time. [] Patient demonstrated improvement from condition with  ** Of  ** Short term goals met. []Patient demonstrated improvement from condition with **   Of **  Long term goals met. [x] Patient to continue exercise/home instructions independently. [] Therapy interrupted due to:    [] Patient has 2 or more no shows/cancels, is discontinued per our policy. [x] Patient has completed prescribed number of treatment sessions. [] Other:      Recommendations/Comments: If patient would like to return to physical therapy, a new referral would be needed.      Treatment Included:     [x] Therapeutic Exercise   96222  [] Iontophoresis: 4 mg/mL Dexamethasone Sodium Phosphate  mAmin  68260   [] Therapeutic Activity  07533 [] Vasopneumatic cold with compression  25139    [] Gait Training   13898 [] Ultrasound   53717   [] Neuromuscular Re-education  [id number] [] Electrical Stimulation Unattended  47220   [] Manual Therapy  90924 [] Electrical Stimulation Attended  [id number]   [x] Instruction in HEP  [] Lumbar/Cervical Traction  [id number]   [x] Aquatic Therapy   [id number] [] Cold/hotpack    [] Massage   [id number]      [] Dry Needling, 1 or 2 muscles  99820   [] Biofeedback, first 15 minutes   29646  [] Biofeedback, additional 15 minutes   76743 [] Dry Needling, 3 or more muscles  06040                If you have any questions or concerns regarding this patient's care, please contact us.    Thank you for your referral.      Electronically signed by: Brennen Garcia PT

## 2021-09-13 NOTE — FLOWSHEET NOTE
Pt's sister [facility] was in waiting room, waiting for pt's other sister to arrive. [facility] very concerned as pt didn't know her when she arrived. [facility] gave writer medical history for pt including fact she had stroke 8/1 and had been in the hospital and rehab. Writer provided listening presence, explained logistics of the stroke alert process, and will follow up when pt's status determined. 09/13/21 1421   Encounter Summary   Services provided to: Family   Referral/Consult From: Multi-disciplinary team   Continue Visiting   (9-13-21)   Complexity of Encounter High   Length of Encounter 15 minutes   Crisis   Type Stroke Alert   Assessment Anxious; Approachable   Intervention Active listening;Explored feelings, thoughts, concerns;Sustaining presence/ Ministry of presence; Discussed illness/injury and it's impact   Outcome Expressed gratitude;Engaged in conversation;Expressed feelings/needs/concerns;Receptive

## 2021-09-13 NOTE — ED PROVIDER NOTES
[facility]      Pt Name: Raine Bueno  MRN: [id number]  [place] 1960  Date of evaluation: 9/13/21    CHIEF COMPLAINT       Chief Complaint   Patient presents with    Altered Mental Status     HISTORY OF PRESENT ILLNESS   HPI 59 y.o. female presents with c/o altered mental status. History limited secondary to altered mental status. History obtained from a review of the medical record. .     Apparently the patient was at dialysis today. There was an episode of loss of consciousness, but were not sure how long this lasted, after which the patient was very confused. She did not know who she was or where she was did not seem to know recent events. She was transferred to the emergency department. In speaking with the patient she is tearful, she states that she has numbness and cannot feel the right side of her body. She also reports that the right side feels heavy and weak. She denies any headache. No nausea or vomiting. Reviewing the medical record, the patient was recently admitted to the hospital, the patient presented to [facility] on 1 August, she had been having right arm weakness and mental status changes, an MRI showed an acute lacunar infarct in the left frontal lobe. She was treated medically. She ended up requiring dialysis. Speaking with the family, after her stroke, she did not have any significant weakness or numbness on the right side of her body.       REVIEW OF SYSTEMS     Review of Systems  Unable to obtain secondary to altered mental status    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, not stated as condition with diabetic nephropathy, with long-term current use of insulin (HCC)      Lancets 28G MISC Inject 1 each into the skin 3 times daily. Qty: 90 each, Refills: 10             ALLERGIES     is allergic to adhesive tape, ace inhibitors, iv dye [iodides], and metformin and related. FAMILY HISTORY     She indicated that the status of her father is unknown. SOCIAL HISTORY      reports that she has never smoked. She has never used smokeless tobacco. She reports that she does not drink alcohol and does not use drugs. PHYSICAL EXAM     INITIAL VITALS: BP (!) 160/52   Pulse 51   Temp 98.1 °F (36.7 °C) (Oral)   Resp 17   Ht 5' 5\" (1.651 m)   Wt 236 lb (107 kg)   SpO2 95%   BMI 39.27 kg/m²   Gen: Tearful  Head: Normocephalic, atraumatic  Eye: Pupils equal round reactive to light, no conjunctivitis  ENT: MMM  Neck: no adenopathy JVD  Heart: Bradycardic rhythm no murmurs  Lungs: Clear to auscultation bilaterally, no respiratory distress  Chest wall: No crepitus, no tenderness palpation  Abdomen: Soft, nontender, nondistended, with no peritoneal signs  Neurologic: Patient is alert and oriented x 0,  NIH Stroke Scale  NIH Stroke Scale Assessed: Yes  Interval: Baseline  Level of Consciousness (1a. ): Alert  LOC Questions (1b. ):  Answers neither question correctly  LOC Commands (1c. ): Performs both tasks correctly  Best Gaze (2. ): Normal  Visual (3. ): No visual loss  Facial Palsy (4. ): Normal symmetrical movement  Motor Arm, Left (5a. ): No drift  Motor Arm, Right (5b. ): Some effort against gravity  Motor Leg, Left (6a. ): Drift, but does not hit bed  Motor Leg, Right (6b. ): Some effort against gravity  Limb Ataxia (7. ): (!) Present in one limb  Sensory (8. ): (!) Severe to total loss  Best Language (9. ): No aphasia  Dysarthria (10. ): Normal  Extinction and Inattention (11): No abnormality  Total: 10  Extremities: bilateral equal edema    MEDICAL DECISION MAKING:     MDM  59 y.o. female presenting with acute neurologic symptoms. Stroke alert called. Emergency Department course:    2:25 PM EDT  Spoke with Dr. Aryan Ayoub, stroke neurologist. Not a TPA candidate given the fact that she is on anticoagulation. He would like to get an MRA / MRI. Will admit for further evaluation. 3:29 PM EDT  Updated family, they are in agreement with the treatment plan. 3:50 PM EDT   systolic. Starting IVF. 4:45 PM EDT  Spoke with Dr. Lara Gregorio and bridging orders placed. DIAGNOSTIC RESULTS     EKG: All EKG's are interpreted by the Emergency Department Physician who either signs or Co-signs this chart in the absence of a cardiologist.    EKG shows a sinus bradycardia rhythm. HR is 55, , , , no QUINTON, No STD, No TWI, the axis is normal.      RADIOLOGY:All plain film, CT, MRI, and formal ultrasound images (except ED bedside ultrasound) are read by the radiologist and the images and interpretations are directly viewed by the emergency physician. MRA NECK WO CONTRAST   Final Result   No hemodynamically significant stenosis identified. Possible mild narrowing   of the proximal most left ICA as detailed above. If further evaluation is   desired, consider CTA or duplex ultrasound. MRI BRAIN WO CONTRAST   Final Result   Normal examinations. MRA HEAD WO CONTRAST   Final Result   Normal examinations. CT HEAD WO CONTRAST   Final Result   No acute intracranial abnormality. Critical results were called by Dr. Amaya Oleary to Dr Cesar Peña on 9/13/2021 at   14:32. LABS: All lab results were reviewed by myself, and all abnormals are listed below.   Labs Reviewed   CBC WITH AUTO DIFFERENTIAL - Abnormal; Notable for the following components:       Result Value    RBC 3.97 (*)     Seg Neutrophils 67 (*)     Lymphocytes 23 (*)     All other components within normal limits   COMPREHENSIVE METABOLIC PANEL - Abnormal; Notable for the following components: Glucose 174 (*)     CREATININE 1.77 (*)     Alkaline Phosphatase 118 (*)     GFR Non- 29 (*)     GFR  35 (*)     All other components within normal limits   TROPONIN - Abnormal; Notable for the following components:    Troponin, High Sensitivity 67 (*)     All other components within normal limits   PROTIME-INR - Abnormal; Notable for the following components:    Protime 15.5 (*)     All other components within normal limits   TROPONIN - Abnormal; Notable for the following components:    Troponin, High Sensitivity 58 (*)     All other components within normal limits   CREATININE W/GFR POINT OF CARE - Abnormal; Notable for the following components:    POC Creatinine 1.94 (*)     GFR Comment 32 (*)     GFR Non- 26 (*)     All other components within normal limits   URINE RT REFLEX TO CULTURE   CBC   COMPREHENSIVE METABOLIC PANEL   POCT GLUCOSE   POCT GLUCOSE       EMERGENCY DEPARTMENT COURSE:   Vitals:    Vitals:    09/13/21 1619 09/13/21 1647 09/13/21 1930 09/13/21 2000   BP: (!) 73/55 (!) 128/54 (!) 159/57 (!) 160/52   Pulse: 55  51 51   Resp: 17  13 17   Temp:       TempSrc:       SpO2: 95%  (!) 87% 95%   Weight:       Height:           The patient was given the following medications while in the emergency department:  Orders Placed This Encounter   Medications    0.9 % sodium chloride infusion    sodium chloride flush 0.9 % injection 5-40 mL    sodium chloride flush 0.9 % injection 5-40 mL    0.9 % sodium chloride infusion    acetaminophen (TYLENOL) tablet 650 mg    OR Linked Order Group     ondansetron (ZOFRAN-ODT) disintegrating tablet 4 mg     ondansetron (ZOFRAN) injection 4 mg    acetaminophen (TYLENOL) tablet 650 mg    aspirin chewable tablet 81 mg    ranolazine (RANEXA) extended release tablet 1,000 mg    busPIRone (BUSPAR) tablet 7.5 mg    apixaban (ELIQUIS) tablet 5 mg    gabapentin (NEURONTIN) capsule 300 mg    insulin glargine (LANTUS;BASAGLAR) injection pen 53 Units    atorvastatin (LIPITOR) tablet 80 mg    carvedilol (COREG) tablet 6.25 mg    furosemide (LASIX) tablet 80 mg    febuxostat (ULORIC) tablet 40 mg    ferrous sulfate (IRON 325) tablet 325 mg    polyethylene glycol (GLYCOLAX) packet 17 g    senna (SENOKOT) tablet 17.2 mg    tamsulosin (FLOMAX) capsule 0.4 mg    pantoprazole (PROTONIX) tablet 40 mg    traZODone (DESYREL) tablet 75 mg    insulin lispro (HUMALOG) injection vial 0-12 Units    insulin lispro (HUMALOG) injection vial 0-6 Units    glucose (GLUTOSE) 40 % oral gel 15 g    dextrose 50 % IV solution    glucagon (rDNA) injection 1 mg    dextrose 5 % solution     -------------------------  CRITICAL CARE:   CONSULTS: IP CONSULT TO PRIMARY CARE PROVIDER  IP CONSULT TO NEUROLOGY  IP CONSULT TO NEPHROLOGY  PROCEDURES: Procedures     FINAL IMPRESSION      1. Acute encephalopathy          DISPOSITION/PLAN   DISPOSITION Admitted 09/13/2021 04:20:44 PM      PATIENT REFERRED TO:  No follow-up provider specified.     DISCHARGE MEDICATIONS:  Current Discharge Medication List            Ricardo Villalpando MD  Attending Emergency Physician                     Ricardo Villalpando MD  09/13/21 032

## 2021-09-13 NOTE — VIRTUAL HEALTH
Consults  Patient Location:  [address] [place] [address] ED    Provider Location (City/State): [address] [place] [address] Stroke and Vascular Neurology Consult for  Arnold Nicole Stroke Alert through [address] @   9/13/2021 2:31 PM    Pt Name: Lenny Barrera  MRN: [id number]  YOB: 1960  Date of evaluation: 9/13/2021  Primary Care Physician: AFSANEH Gonzalez CNP  Reason for Evaluation: Stroke evaluation with Phone Consult, Discussion and Review of imaging    Ms. Linda Patton is a 78-year-old female with past medical history significant for CAD, CHF, diabetes, hypertension, hyperlipidemia, end-stage renal disease on hemodialysis, previous left MCA stroke, atrial fibrillation on Eliquis presented today to [facility] ED after a syncopal episode. Apparently patient was receiving her dialysis. At the end of the dialysis, patient had a syncopal episode. Patient regained consciousness within 3 to 4 minutes. Patient was therefore sent to [address] [place] [address]. Upon presentation in ER, patient was noted to have some disorientation, right arm and leg weakness. Some drift in right arm and right leg. Patient was also found to be disoriented and had mild dysarthria. Patient also had left lower extremity drift. Therefore stroke alert was called. Allergies  is allergic to adhesive tape, ace inhibitors, iv dye [iodides], and metformin and related. Medications  Prior to Admission medications    Medication Sig Start Date End Date Taking?  Authorizing Provider   pantoprazole (PROTONIX) 40 MG tablet TAKE 1 TABLET BY MOUTH EVERY MORNING BEFORE BREAKFAST 9/7/21   AFSANEH Gonzalez CNP   acetaminophen (TYLENOL) 325 MG tablet Take 2 tablets by mouth every 4 hours as needed for Pain 8/25/21   Pravin Butcher MD   aspirin 81 MG chewable tablet Take 1 tablet by mouth daily 8/26/21   Pravin Butcher MD   ranolazine (RANEXA) 1000 MG extended release tablet Take 1 tablet by mouth 2 times daily 8/25/21   Bhavik Pagan MD   busPIRone (BUSPAR) 7.5 MG tablet Take 1 tablet by mouth 3 times daily 8/25/21   Bhavik Pagan MD   ELIQUIS 5 MG TABS tablet Take 1 tablet by mouth 2 times daily 8/25/21   Bhavik Pagan MD   gabapentin (NEURONTIN) 300 MG capsule Take 1 capsule by mouth 2 times daily for 30 days. 8/25/21 9/24/21  Bhavik Pagan MD   traZODone (DESYREL) 100 MG tablet Take 1 tablet by mouth nightly 8/25/21   Bhavik Pagan MD   insulin glargine [facility]) 100 UNIT/ML injection pen Inject 53 Units into the skin 2 times daily 8/25/21   Bhavik Pagan MD   insulin aspart (NOVOLOG FLEXPEN) 100 UNIT/ML injection pen Sliding scale three times daily with meals as follows:  Glucose <139  No Insulin; 140-199  2 Units; 200-249  4 Units; 250-299  6 Units; 300-349  8 Units; 350-400  10 Units; Above 400  12 Units 8/25/21   Bhavik Pagan MD   atorvastatin (LIPITOR) 80 MG tablet Take 1 tablet by mouth daily 8/25/21   Bhavik Pagan MD   carvedilol (COREG) 6.25 MG tablet Take 1 tablet by mouth 2 times daily 8/25/21   Bhavik Pagan MD   blood glucose test strips (DEN CONTOUR TEST) strip 1 each by In Vitro route 3 times daily 8/25/21   Bhavik Pagan MD   furosemide (LASIX) 80 MG tablet Take 1 tablet by mouth 2 times daily 8/26/21   Bhavik Pagan MD   febuxostat (ULORIC) 40 MG TABS tablet Take 1 tablet by mouth daily 8/26/21   Bhavik Pagan MD   ferrous sulfate (BRIAN-ARCHIE) 325 (65 Fe) MG tablet Take 1 tablet by mouth 2 times daily (with meals) 8/25/21   Bhavik Pagan MD   polyethylene glycol (GLYCOLAX) 17 g packet Take 17 g by mouth daily as needed for Constipation 8/25/21   Bhavik Pagan MD   senna (SENOKOT) 8.6 MG tablet Take 2 tablets by mouth daily as needed (Constipation) 8/25/21   Bhavik Pagan MD   Insulin Pen Needle (BD ULTRA-FINE PEN NEEDLES) 29G X 12.7MM MISC Use one needle for each insulin injection.  8/25/21   Bhavik Pagan MD   [facility]) 0.4 MG capsule Take 1 capsule by mouth daily 8/26/21   Jose Hawkins MD   Alcohol Swabs 70 % PADS Use an alcohol swab to cleanse the skin before checking your blood sugar and before each insulin injection. 8/25/21   Jose Hawkins MD   sharps container 1 each by Does not apply route as needed (dirty pen needles) 4/19/21   Doylene Yomi, APRN - CNP   allopurinol (ZYLOPRIM) 100 MG tablet Take 1 tablet by mouth daily 4/14/21   Doylene Yomi APRN - CNP   Lancets 28G MISC Inject 1 each into the skin 3 times daily. 10/30/14   Nubia Lewis MD    Scheduled Meds:  Continuous Infusions:  PRN Meds:.  Past Medical History   has a past medical history of Backache, unspecified, CHF (congestive heart failure) ([facility] [address] [facility] Chronic kidney disease, Coronary atherosclerosis of artery bypass graft, Cramp of limb, Gallstones, Hypertension, Insomnia, Pneumonia, Type II or unspecified type diabetes mellitus with renal manifestations, not stated as uncontrolled(250.40), Type II or unspecified type diabetes mellitus without mention of complication, not stated as uncontrolled, and Unspecified vitamin D deficiency.   Social History  Social History     Socioeconomic History    Marital status: Single     Spouse name: Not on file    Number of children: Not on file    Years of education: Not on file    Highest education level: Not on file   Occupational History    Not on file   Tobacco Use    Smoking status: Never Smoker    Smokeless tobacco: Never Used   Substance and Sexual Activity    Alcohol use: No    Drug use: No    Sexual activity: Not Currently   Other Topics Concern    Not on file   Social History Narrative    Not on file     Social Determinants of Health     Financial Resource Strain: Low Risk     Difficulty of Paying Living Expenses: Not hard at all   Food Insecurity:     Worried About [address] in the Last Year:     [address] in the Last Year:    Transportation Needs:     Lack of Transportation (Medical):  Lack of Transportation (Non-Medical):    Physical Activity:     Days of Exercise per Week:     Minutes of Exercise per Session:    Stress:     Feeling of Stress :    Social Connections:     Frequency of Communication with Friends and Family:     Frequency of Social Gatherings with Friends and Family:     Attends [employer] Services:     Active Member of Clubs or Organizations:     Attends Club or Organization Meetings:     Marital Status:    Intimate Partner Violence:     Fear of Current or Ex-Partner:     Emotionally Abused:     Physically Abused:     Sexually Abused:      Family History      Problem Relation Age of Onset    Diabetes Father     Heart Failure Father        OBJECTIVE  Vitals:    09/13/21 1420   BP: (!) 159/63   Pulse: 57   Resp: 11   Temp:    SpO2: 99%        Patient not seen in person. NIHSS calculated based on ED MD report. NIHSS was 09. Premorbid MRS: 03      Imaging:   Images were personally reviewed with VIZ. AI and PACS used to review images including:  CT brain without contrast: no large territory hypodensity or bleed  CTA imaging: Not completed at the time. Assessment  Ms. Elli Guerrero is a 70-year-old female with past medical history significant for hypertension, dyslipidemia, diabetes, end-stage renal disease on hemodialysis, previous left MCA stroke, atrial fibrillation on Eliquis presented today after a syncopal episode and was found to have disorientation, right arm and right leg weakness as well as left lower extremity weakness. NIH stroke scale was 9. Patient was not an IV TPA candidate secondary to systemic anticoagulation. At this time, patient symptoms could be explained by recrudescence of stroke symptoms secondary to syncope/hypotension? .    Recommendations:  --Inpatient admission for further work up. --MRI brain and MRA head. --Continue Aspirin. --Resume Eliquis after MRI brain is completed. --Permissive hypertension. --Consider Routine EEG. Discussed with ED Physician    At least 20 min of Telemedicine and time in conversation directly with ED staff and physician for the patient who is in imminent and life threatening deterioration without further treatment and evaluation. This Virtual Visit was conducted with patient's (and/or legal guardian's) consent, to provide telestroke consultation and necessary medical care. Time spent examining patient, reviewing the images personally, reviewing the chart, perform high complexity decision making and speaking with the nursing staff regarding recommendations    This is a Phone Consult, I have not seen the patient face to face, the telemedicine device was not utilized. Lucille Severs, MD  Stroke, Neurocritical Care And/or [address] [place] [address] Stroke [address]   Electronically signed 9/13/2021 at 2:31 PM      This virtual visit was conducted via interactive/real-time audio/video.

## 2021-09-14 PROBLEM — E66.01 MORBID OBESITY WITH BMI OF 40.0-44.9, ADULT (HCC): Status: ACTIVE | Noted: 2021-09-14

## 2021-09-14 LAB
-: NORMAL
-: NORMAL
ALBUMIN SERPL-MCNC: 3.1 G/DL (ref 3.5–5.2)
ALBUMIN/GLOBULIN RATIO: ABNORMAL (ref 1–2.5)
ALP BLD-CCNC: 91 U/L (ref 35–104)
ALT SERPL-CCNC: 10 U/L (ref 5–33)
AMORPHOUS: NORMAL
ANION GAP SERPL CALCULATED.3IONS-SCNC: 12 MMOL/L (ref 9–17)
AST SERPL-CCNC: 13 U/L
BACTERIA: NORMAL
BILIRUB SERPL-MCNC: 0.41 MG/DL (ref 0.3–1.2)
BILIRUBIN URINE: NEGATIVE
BUN BLDV-MCNC: 25 MG/DL (ref 8–23)
BUN/CREAT BLD: ABNORMAL (ref 9–20)
CALCIUM SERPL-MCNC: 8.5 MG/DL (ref 8.6–10.4)
CASTS UA: NORMAL /LPF
CHLORIDE BLD-SCNC: 106 MMOL/L (ref 98–107)
CO2: 24 MMOL/L (ref 20–31)
COLOR: YELLOW
COMMENT UA: ABNORMAL
CREAT SERPL-MCNC: 2.2 MG/DL (ref 0.5–0.9)
CRYSTALS, UA: NORMAL /HPF
EKG ATRIAL RATE: 55 BPM
EKG P AXIS: 59 DEGREES
EKG P-R INTERVAL: 168 MS
EKG Q-T INTERVAL: 508 MS
EKG QRS DURATION: 104 MS
EKG QTC CALCULATION (BAZETT): 485 MS
EKG R AXIS: 8 DEGREES
EKG T AXIS: 67 DEGREES
EKG VENTRICULAR RATE: 55 BPM
EPITHELIAL CELLS UA: NORMAL /HPF
GFR AFRICAN AMERICAN: 27 ML/MIN
GFR NON-AFRICAN AMERICAN: 23 ML/MIN
GFR SERPL CREATININE-BSD FRML MDRD: ABNORMAL ML/MIN/{1.73_M2}
GFR SERPL CREATININE-BSD FRML MDRD: ABNORMAL ML/MIN/{1.73_M2}
GLUCOSE BLD-MCNC: 142 MG/DL (ref 70–99)
GLUCOSE BLD-MCNC: 155 MG/DL (ref 65–105)
GLUCOSE BLD-MCNC: 164 MG/DL (ref 65–105)
GLUCOSE BLD-MCNC: 234 MG/DL (ref 65–105)
GLUCOSE BLD-MCNC: 296 MG/DL (ref 65–105)
GLUCOSE BLD-MCNC: 319 MG/DL (ref 65–105)
GLUCOSE URINE: NEGATIVE
HCT VFR BLD CALC: 34 % (ref 36–46)
HEMOGLOBIN: 11.3 G/DL (ref 12–16)
KETONES, URINE: NEGATIVE
LEUKOCYTE ESTERASE, URINE: ABNORMAL
MCH RBC QN AUTO: 31.6 PG (ref 26–34)
MCHC RBC AUTO-ENTMCNC: 33.2 G/DL (ref 31–37)
MCV RBC AUTO: 95.3 FL (ref 80–100)
MUCUS: NORMAL
NITRITE, URINE: NEGATIVE
NRBC AUTOMATED: ABNORMAL PER 100 WBC
OTHER OBSERVATIONS UA: NORMAL
PDW BLD-RTO: 14.8 % (ref 11.5–14.9)
PH UA: 7.5 (ref 5–8)
PLATELET # BLD: 159 K/UL (ref 150–450)
PMV BLD AUTO: 8.2 FL (ref 6–12)
POTASSIUM SERPL-SCNC: 3.5 MMOL/L (ref 3.7–5.3)
PROTEIN UA: ABNORMAL
RBC # BLD: 3.57 M/UL (ref 4–5.2)
RBC UA: NORMAL /HPF
REASON FOR REJECTION: NORMAL
RENAL EPITHELIAL, UA: NORMAL /HPF
SODIUM BLD-SCNC: 142 MMOL/L (ref 135–144)
SPECIFIC GRAVITY UA: 1.01 (ref 1–1.03)
TOTAL PROTEIN: 5.8 G/DL (ref 6.4–8.3)
TRICHOMONAS: NORMAL
TURBIDITY: CLEAR
URINE HGB: NEGATIVE
UROBILINOGEN, URINE: NORMAL
WBC # BLD: 6 K/UL (ref 3.5–11)
WBC UA: NORMAL /HPF
YEAST: NORMAL
ZZ NTE CLEAN UP: ORDERED TEST: NORMAL
ZZ NTE WITH NAME CLEAN UP: SPECIMEN SOURCE: NORMAL

## 2021-09-14 PROCEDURE — 92523 SPEECH SOUND LANG COMPREHEN: CPT

## 2021-09-14 PROCEDURE — 97530 THERAPEUTIC ACTIVITIES: CPT

## 2021-09-14 PROCEDURE — 6370000000 HC RX 637 (ALT 250 FOR IP): Performed by: FAMILY MEDICINE

## 2021-09-14 PROCEDURE — 97166 OT EVAL MOD COMPLEX 45 MIN: CPT

## 2021-09-14 PROCEDURE — 92610 EVALUATE SWALLOWING FUNCTION: CPT

## 2021-09-14 PROCEDURE — 99223 1ST HOSP IP/OBS HIGH 75: CPT | Performed by: FAMILY MEDICINE

## 2021-09-14 PROCEDURE — 99254 IP/OBS CNSLTJ NEW/EST MOD 60: CPT | Performed by: PSYCHIATRY & NEUROLOGY

## 2021-09-14 PROCEDURE — 97110 THERAPEUTIC EXERCISES: CPT

## 2021-09-14 PROCEDURE — 6370000000 HC RX 637 (ALT 250 FOR IP): Performed by: INTERNAL MEDICINE

## 2021-09-14 PROCEDURE — 36415 COLL VENOUS BLD VENIPUNCTURE: CPT

## 2021-09-14 PROCEDURE — 95819 EEG AWAKE AND ASLEEP: CPT

## 2021-09-14 PROCEDURE — 97162 PT EVAL MOD COMPLEX 30 MIN: CPT

## 2021-09-14 PROCEDURE — 81001 URINALYSIS AUTO W/SCOPE: CPT

## 2021-09-14 PROCEDURE — 80053 COMPREHEN METABOLIC PANEL: CPT

## 2021-09-14 PROCEDURE — 2060000000 HC ICU INTERMEDIATE R&B

## 2021-09-14 PROCEDURE — 85027 COMPLETE CBC AUTOMATED: CPT

## 2021-09-14 PROCEDURE — 97116 GAIT TRAINING THERAPY: CPT

## 2021-09-14 PROCEDURE — 93010 ELECTROCARDIOGRAM REPORT: CPT | Performed by: INTERNAL MEDICINE

## 2021-09-14 PROCEDURE — 2580000003 HC RX 258: Performed by: EMERGENCY MEDICINE

## 2021-09-14 RX ORDER — POTASSIUM CHLORIDE 7.45 MG/ML
10 INJECTION INTRAVENOUS PRN
Status: DISCONTINUED | OUTPATIENT
Start: 2021-09-14 | End: 2021-09-17 | Stop reason: HOSPADM

## 2021-09-14 RX ORDER — POTASSIUM CHLORIDE 20 MEQ/1
20 TABLET, EXTENDED RELEASE ORAL ONCE
Status: COMPLETED | OUTPATIENT
Start: 2021-09-14 | End: 2021-09-14

## 2021-09-14 RX ORDER — POTASSIUM CHLORIDE 20 MEQ/1
40 TABLET, EXTENDED RELEASE ORAL PRN
Status: DISCONTINUED | OUTPATIENT
Start: 2021-09-14 | End: 2021-09-17 | Stop reason: HOSPADM

## 2021-09-14 RX ADMIN — BUSPIRONE HYDROCHLORIDE 7.5 MG: 5 TABLET ORAL at 00:10

## 2021-09-14 RX ADMIN — INSULIN LISPRO 4 UNITS: 100 INJECTION, SOLUTION INTRAVENOUS; SUBCUTANEOUS at 17:50

## 2021-09-14 RX ADMIN — APIXABAN 5 MG: 5 TABLET, FILM COATED ORAL at 00:15

## 2021-09-14 RX ADMIN — INSULIN LISPRO 8 UNITS: 100 INJECTION, SOLUTION INTRAVENOUS; SUBCUTANEOUS at 12:03

## 2021-09-14 RX ADMIN — APIXABAN 5 MG: 5 TABLET, FILM COATED ORAL at 08:02

## 2021-09-14 RX ADMIN — POTASSIUM CHLORIDE 20 MEQ: 1500 TABLET, EXTENDED RELEASE ORAL at 16:18

## 2021-09-14 RX ADMIN — ATORVASTATIN CALCIUM 80 MG: 80 TABLET, FILM COATED ORAL at 08:01

## 2021-09-14 RX ADMIN — INSULIN GLARGINE 53 UNITS: 100 INJECTION, SOLUTION SUBCUTANEOUS at 08:02

## 2021-09-14 RX ADMIN — ASPIRIN 81 MG: 81 TABLET, CHEWABLE ORAL at 08:00

## 2021-09-14 RX ADMIN — SODIUM CHLORIDE: 9 INJECTION, SOLUTION INTRAVENOUS at 09:52

## 2021-09-14 RX ADMIN — INSULIN GLARGINE 53 UNITS: 100 INJECTION, SOLUTION SUBCUTANEOUS at 20:42

## 2021-09-14 RX ADMIN — FUROSEMIDE 80 MG: 40 TABLET ORAL at 16:18

## 2021-09-14 RX ADMIN — RANOLAZINE 1000 MG: 500 TABLET, FILM COATED, EXTENDED RELEASE ORAL at 00:10

## 2021-09-14 RX ADMIN — APIXABAN 5 MG: 5 TABLET, FILM COATED ORAL at 20:44

## 2021-09-14 RX ADMIN — FEBUXOSTAT 40 MG: 40 TABLET, FILM COATED ORAL at 09:52

## 2021-09-14 RX ADMIN — BUSPIRONE HYDROCHLORIDE 7.5 MG: 5 TABLET ORAL at 13:45

## 2021-09-14 RX ADMIN — TRAZODONE HYDROCHLORIDE 75 MG: 50 TABLET ORAL at 00:10

## 2021-09-14 RX ADMIN — GABAPENTIN 300 MG: 300 CAPSULE ORAL at 00:10

## 2021-09-14 RX ADMIN — INSULIN LISPRO 3 UNITS: 100 INJECTION, SOLUTION INTRAVENOUS; SUBCUTANEOUS at 20:42

## 2021-09-14 RX ADMIN — TAMSULOSIN HYDROCHLORIDE 0.4 MG: 0.4 CAPSULE ORAL at 08:00

## 2021-09-14 RX ADMIN — SENNOSIDES 17.2 MG: 8.6 TABLET, COATED ORAL at 09:52

## 2021-09-14 RX ADMIN — SODIUM CHLORIDE: 9 INJECTION, SOLUTION INTRAVENOUS at 20:27

## 2021-09-14 RX ADMIN — GABAPENTIN 300 MG: 300 CAPSULE ORAL at 08:01

## 2021-09-14 RX ADMIN — FUROSEMIDE 80 MG: 40 TABLET ORAL at 08:01

## 2021-09-14 RX ADMIN — INSULIN LISPRO 2 UNITS: 100 INJECTION, SOLUTION INTRAVENOUS; SUBCUTANEOUS at 08:33

## 2021-09-14 RX ADMIN — POTASSIUM CHLORIDE 40 MEQ: 1500 TABLET, EXTENDED RELEASE ORAL at 13:04

## 2021-09-14 RX ADMIN — RANOLAZINE 1000 MG: 500 TABLET, FILM COATED, EXTENDED RELEASE ORAL at 07:59

## 2021-09-14 RX ADMIN — CARVEDILOL 6.25 MG: 6.25 TABLET, FILM COATED ORAL at 00:10

## 2021-09-14 RX ADMIN — RANOLAZINE 1000 MG: 500 TABLET, FILM COATED, EXTENDED RELEASE ORAL at 20:44

## 2021-09-14 RX ADMIN — BUSPIRONE HYDROCHLORIDE 7.5 MG: 5 TABLET ORAL at 09:52

## 2021-09-14 RX ADMIN — CARVEDILOL 6.25 MG: 6.25 TABLET, FILM COATED ORAL at 08:01

## 2021-09-14 RX ADMIN — PANTOPRAZOLE SODIUM 40 MG: 40 TABLET, DELAYED RELEASE ORAL at 06:06

## 2021-09-14 RX ADMIN — TRAZODONE HYDROCHLORIDE 75 MG: 50 TABLET ORAL at 20:44

## 2021-09-14 RX ADMIN — GABAPENTIN 300 MG: 300 CAPSULE ORAL at 20:43

## 2021-09-14 RX ADMIN — BUSPIRONE HYDROCHLORIDE 7.5 MG: 5 TABLET ORAL at 22:04

## 2021-09-14 ASSESSMENT — PAIN SCALES - GENERAL: PAINLEVEL_OUTOF10: 0

## 2021-09-14 NOTE — PROGRESS NOTES
Dr. Hai Gunter notified of patient arrival to the floor and orders needed for patient via perfectserve.

## 2021-09-14 NOTE — PLAN OF CARE
Problem: Falls - Risk of:  Goal: Will remain free from falls  9/14/2021 1747 by Darya Galvez RN  Outcome: Ongoing     Problem: Skin Integrity:  Goal: Will show no infection signs and symptoms  9/14/2021 1747 by Darya Galvez RN  Outcome: Ongoing     Problem: Skin Integrity:  Goal: Complications related to intravenous access or infusion will be avoided or minimized  9/14/2021 1747 by Darya Galvez RN  Outcome: Ongoing     Problem: ACTIVITY INTOLERANCE/IMPAIRED MOBILITY  Goal: Mobility/activity is maintained at optimum level for patient  9/14/2021 1747 by Darya Galvez RN  Outcome: Ongoing     Problem: COMMUNICATION IMPAIRMENT  Goal: Ability to express needs and understand communication  9/14/2021 1747 by Darya Galvez RN  Outcome: Ongoing     Problem: Physical Regulation:  Goal: Will remain free from infection  9/14/2021 1747 by Darya Galvez RN  Outcome: Ongoing     Problem: Neurological  Goal: Maximum potential motor/sensory/cognitive function  Outcome: Ongoing

## 2021-09-14 NOTE — H&P
Family Medicine Admit Note    PCP: Maricruz Adams, APRN - CNP    Date of Admission: 9/13/2021    Date of Service: Pt seen/examined on 9/14/21 and Admitted to Inpatient. Chief Complaint:  Altered Mental Status      History Of Present Illness: The patient is a 59 y.o. female who presents to [facility] with complaints of altered mental status. History per patient limited. Additional history per nursing and EMR. Patient was in dialysis today and there was an episode of loss of consciousness. Patient unsure of how long it lasted and afterwards she was very confused. She did not seem to know who she was or where she was. She was then transferred to the ED. Reviewing her medical record, it shows that she suffered a lacunar infarct at [address] [place] [address]. She was treated medically and ended up requiring dialysis. She was transferred from [place] [address] to acute rehab here at [facility]. Per her family, she did not have any significant weakness or numbness on her right side. This am, she is awake & pleasant but still slightly confused. She knows who she is, where she is and she recognizes me but she cannot tell me her story accurately. She complains of her right leg feeling heavy and weak. She denies any pain, headaches, N/V or diarrhea.     Past Medical History:        Diagnosis Date    Backache, unspecified     CHF (congestive heart failure) (HCC)     Chronic kidney disease     Coronary atherosclerosis of artery bypass graft     Cramp of limb     Gallstones     Hypertension     Insomnia     Pneumonia     Type II or unspecified type diabetes mellitus with renal manifestations, not stated as uncontrolled(250.40)     Type II or unspecified type diabetes mellitus without mention of complication, not stated as uncontrolled     Unspecified vitamin D deficiency        Past Surgical History:        Procedure Laterality Date    ANKLE FRACTURE SURGERY      BREAST SURGERY      CARPAL TUNNEL RELEASE      CHOLECYSTECTOMY, OPEN N/A     CORONARY ARTERY BYPASS GRAFT      x3    HAND SURGERY      IR TUNNELED CATHETER PLACEMENT GREATER THAN 5 YEARS  8/18/2021    IR TUNNELED CATHETER PLACEMENT GREATER THAN 5 YEARS 8/18/2021 [facility] SPECIAL PROCEDURES    KNEE ARTHROSCOPY      right    TONSILLECTOMY         Medications Prior to Admission:    Prior to Admission medications    Medication Sig Start Date End Date Taking? Authorizing Provider   insulin lispro, 1 Unit Dial, (HUMALOG KWIKPEN) 100 UNIT/ML SOPN Inject into the skin 3 times daily (before meals) Per sliding scale   Yes Historical Provider, MD   traZODone (DESYREL) 50 MG tablet Take 75 mg by mouth nightly   Yes Historical Provider, MD   pantoprazole (PROTONIX) 40 MG tablet TAKE 1 TABLET BY MOUTH EVERY MORNING BEFORE BREAKFAST 9/7/21  Yes AFSANEH Henry CNP   acetaminophen (TYLENOL) 325 MG tablet Take 2 tablets by mouth every 4 hours as needed for Pain 8/25/21  Yes Shawn Newton MD   aspirin 81 MG chewable tablet Take 1 tablet by mouth daily 8/26/21  Yes Shawn Newton MD   ranolazine (RANEXA) 1000 MG extended release tablet Take 1 tablet by mouth 2 times daily 8/25/21  Yes Shawn Newton MD   busPIRone (BUSPAR) 7.5 MG tablet Take 1 tablet by mouth 3 times daily 8/25/21  Yes Shawn Newton MD   gabapentin (NEURONTIN) 300 MG capsule Take 1 capsule by mouth 2 times daily for 30 days.  8/25/21 9/24/21 Yes Shawn Newton MD   insulin glargine [facility]) 100 UNIT/ML injection pen Inject 53 Units into the skin 2 times daily 8/25/21  Yes Shawn Newton MD   atorvastatin (LIPITOR) 80 MG tablet Take 1 tablet by mouth daily 8/25/21  Yes Shawn Newton MD   carvedilol (COREG) 6.25 MG tablet Take 1 tablet by mouth 2 times daily 8/25/21  Yes Shawn Newton MD   furosemide (LASIX) 80 MG tablet Take 1 tablet by mouth 2 times daily 8/26/21  Yes Shawn Newton MD   febuxostat (ULORIC) 40 MG TABS tablet Take 1 tablet by mouth daily 8/26/21  Yes Mitali Delarosa Kranthi Talamantes MD   ferrous sulfate (BRIAN-ARCHIE) 325 (65 Fe) MG tablet Take 1 tablet by mouth 2 times daily (with meals) 8/25/21  Yes Joyce Ibrahim MD   polyethylene glycol (GLYCOLAX) 17 g packet Take 17 g by mouth daily as needed for Constipation 8/25/21  Yes Joyce Ibrahim MD   tamsulosin [facility]) 0.4 MG capsule Take 1 capsule by mouth daily 8/26/21  Yes Joyce Ibrahim MD   ELIQUIS 5 MG TABS tablet Take 1 tablet by mouth 2 times daily 8/25/21   Joyce Ibrahim MD   blood glucose test strips (DEN CONTOUR TEST) strip 1 each by In Vitro route 3 times daily 8/25/21   Joyce Ibrahim MD   senna (SENOKOT) 8.6 MG tablet Take 2 tablets by mouth daily as needed (Constipation) 8/25/21   Joyce Ibrahim MD   Insulin Pen Needle (BD ULTRA-FINE PEN NEEDLES) 29G X 12.7MM MISC Use one needle for each insulin injection. 8/25/21   Joyce Ibrahim MD   Alcohol Swabs 70 % PADS Use an alcohol swab to cleanse the skin before checking your blood sugar and before each insulin injection. 8/25/21   Joyce Ibrahim MD   sharps container 1 each by Does not apply route as needed (dirty pen needles) 4/19/21   AFSANEH Ca CNP   allopurinol (ZYLOPRIM) 100 MG tablet Take 1 tablet by mouth daily 4/14/21   AFSANEH Ca CNP   Lancets 28G MISC Inject 1 each into the skin 3 times daily. 10/30/14   Aura Escalera MD       Allergies:  Adhesive tape, Ace inhibitors, Iv dye [iodides], and Metformin and related    Social History:  The patient currently lives at home    TOBACCO:   reports that she has never smoked. She has never used smokeless tobacco.  ETOH:   reports no history of alcohol use.     Review of Systems - General ROS: positive for  - obesity  Psychological ROS: negative  Ophthalmic ROS: negative  ENT ROS: negative  Endocrine ROS: positive for - hyperglycemia  Respiratory ROS: negative  Cardiovascular ROS: negative  Gastrointestinal ROS: negative  Musculoskeletal ROS: positive for - muscular weakness  Neurological ROS: LFT'S  Recent Labs     09/13/21  1409 09/14/21  0554   AST 19 13   ALT 14 10   BILITOT 0.48 0.41   ALKPHOS 118* 91     COAG  Recent Labs     09/13/21  1409   INR 1.2     CARDIAC ENZYMES  No results for input(s): CKTOTAL, CKMB, CKMBINDEX, TROPONINI in the last 72 hours. U/A:    Lab Results   Component Value Date    COLORU YELLOW 09/14/2021    WBCUA 0 TO 2 09/14/2021    RBCUA None 09/14/2021    MUCUS NOT REPORTED 09/14/2021    BACTERIA NOT REPORTED 09/14/2021    SPECGRAV 1.006 09/14/2021    LEUKOCYTESUR TRACE 09/14/2021    GLUCOSEU NEGATIVE 09/14/2021    AMORPHOUS NOT REPORTED 09/14/2021       ABG  No results found for: RXI2YCX, BEART, X2UGLVPI, PHART, THGBART, QUO9YEZ, PO2ART, AGT9FGY        Active Hospital Problems    Diagnosis Date Noted    Morbid obesity with BMI of 40.0-44.9, adult ([facility] [E66.01, Z68.41] 09/14/2021    Stroke-like symptoms [R29.90] 09/13/2021    Chronic ischemic heart disease [I25.9] 07/23/2021    Anemia in stage 4 chronic kidney disease ([facility] [N18.4, D63.1] 05/03/2021    Essential hypertension [I10] 05/03/2021    Diabetic polyneuropathy associated with type 2 diabetes mellitus ([facility] [E11.42] 02/17/2020    Chronic diastolic heart failure ([facility] [I50.32] 09/20/2018    Type 2 diabetes mellitus with kidney complication, with long-term current use of insulin ([facility] [E11.29, Z79.4] 09/20/2018    Mixed hyperlipidemia [E78.2] 07/27/2016    Coronary artery disease [I25.10] 05/04/2015         ASSESSMENT/PLAN:  Patient admitted with Stroke-like symptoms. Co-morbidities as above.     Orders Placed This Encounter   Procedures    CT HEAD WO CONTRAST    MRI BRAIN WO CONTRAST    MRA HEAD WO CONTRAST    MRA NECK WO CONTRAST    CBC with DIFF    Comprehensive Metabolic Panel    Troponin    Protime-INR    Urinalysis Reflex to Culture    Troponin    CBC    Comprehensive Metabolic Panel    Microscopic Urinalysis    SPECIMEN REJECTION    Comprehensive Metabolic Panel    ADULT DIET; Regular; 4 carb choices (60 gm/meal); Low Sodium (2 gm)    Straight cath    Vital signs per unit routine    Notify physician    Notify physician    Up with assistance    HYPOGLYCEMIA TREATMENT: blood glucose less than 50 mg/dL and patient  ALERT and TOLERATING PO    HYPOGLYCEMIA TREATMENT: blood glucose less than 70 mg/dL and patient ALERT and TOLERATING PO    HYPOGLYCEMIA TREATMENT: blood glucose less than 70 mg/dL and patient NOT ALERT or NPO    Telemetry monitoring - continuous duration    Full Code    Inpatient consult to Primary Care Provider    Inpatient consult to Neurology    Inpatient consult to Nephrology    Contact isolation    OT eval and treat    PT eval and treat    Speech-Language Pathology (SLP) Eval and Treat    Creatinine W/GFR Point of Care    POCT Glucose    POCT Glucose    POC Glucose Fingerstick    POC Glucose Fingerstick    POC Glucose Fingerstick    POC Glucose Fingerstick    POC Glucose Fingerstick    POC Glucose Fingerstick    EKG 12 Lead    EEG    PATIENT STATUS (DIRECT) Inpatient    PATIENT STATUS (FROM ED OR OR/PROCEDURAL) Inpatient         DVT Prophylaxis:   Diet: ADULT DIET; Regular; 4 carb choices (60 gm/meal);  Low Sodium (2 gm)  Code Status: Full Code      Dispo - admitted to [facility]       @[facility]@

## 2021-09-14 NOTE — PROGRESS NOTES
Speech Language Pathology  Facility/Department: [facility] PRO[facility]   CLINICAL BEDSIDE SWALLOW EVALUATION    NAME: Andi To  : 1960  MRN: [id number]    ADMISSION DATE: 2021  ADMITTING DIAGNOSIS: has Coronary artery disease; Chronic diastolic heart failure ([address] Diabetic polyneuropathy associated with type 2 diabetes mellitus ([address] History of coronary artery bypass graft; Iron deficiency anemia; Spinal stenosis of lumbar region with neurogenic claudication; Mixed hyperlipidemia; Stage 4 chronic kidney disease ([address] Type 2 diabetes mellitus with kidney complication, with long-term current use of insulin ([address] Syncope and collapse; Obesity, Class II, BMI 35-39.9; Thyroid nodule greater than or equal to 1 cm in diameter incidentally noted on imaging study; Essential hypertension; Anemia in stage 4 chronic kidney disease ([address] Chronic ischemic heart disease; Acute cerebrovascular accident (CVA) ([address] Stroke-like symptoms; and Morbid obesity with BMI of 40.0-44.9, adult ([address] on their problem list.    Recent Chest Xray/CT of Chest:   n/a    Date of Eval: 2021  Evaluating Therapist: BRAYAN Moreno    Current Diet level:  Current Diet : Regular  Current Liquid Diet : Thin      Primary Complaint   Per teleneurology resident H&P:   Ms. Coleen Crandall is a 39-year-old female with past medical history significant for CAD, CHF, diabetes, hypertension, hyperlipidemia, end-stage renal disease on hemodialysis, previous left MCA stroke, atrial fibrillation on Eliquis presented today to [facility] ED after a syncopal episode. Apparently patient was receiving her dialysis. At the end of the dialysis, patient had a syncopal episode. Patient regained consciousness within 3 to 4 minutes. Patient was therefore sent to [address] [place] [address]. Upon presentation in ER, patient was noted to have some disorientation, right arm and leg weakness. Some drift in right arm and right leg.   Patient was also found to be disoriented and had mild dysarthria. Patient also had left lower extremity drift. Therefore stroke alert was called. Pain:  Pain Assessment  Pain Level: 0    Reason for Referral  Qasim Mathews was referred for a bedside swallow evaluation to assess the efficiency of her swallow function, identify signs and symptoms of aspiration and make recommendations regarding safe dietary consistencies, effective compensatory strategies, and safe eating environment. Impression  Dysphagia Diagnosis: Swallow function appears grossly intact  Dysphagia Impression : No overt s/s aspiration demonstrated  Dysphagia Outcome Severity Scale: Level 7: Normal in all situations     Treatment Plan  Requires SLP Intervention: No             Recommended Diet and Intervention  Diet Solids Recommendation: Regular  Liquid Consistency Recommendation: Thin             Compensatory Swallowing Strategies  Compensatory Swallowing Strategies: Upright as possible for all oral intake;Eat/Feed slowly; Small bites/sips      General  Behavior/Cognition: Alert; Cooperative  Respiratory Status: Room air  Breath Sounds: Clear  O2 Device: None (Room air)  Follows Directions: Complex  Dentition: Adequate  Patient Positioning: Upright in bed  Baseline Vocal Quality: Normal  Consistencies Administered: Reg solid; Thin - straw           Vision/Hearing  Vision  Vision: Impaired  Vision Exceptions: Wears glasses for reading  Hearing  Hearing: Within functional limits    Oral Motor Deficits  Oral/Motor  Oral Motor: Within functional limits    Oral Phase Dysfunction  Oral Phase  Oral Phase: WNL     Indicators of Pharyngeal Phase Dysfunction   Pharyngeal Phase  Pharyngeal Phase: WNL      Education  Patient Education Response: Verbalizes understanding             Therapy Time  SLP Individual Minutes  Time In: 3828  Time Out: 2500 [facility]  Minutes: 15          Michelle SPRINGER A.CCC/SLP    9/14/2021 1:20 PM

## 2021-09-14 NOTE — PROGRESS NOTES
Physical Therapy  Facility/Department: [facility] PROGRESSIVE CARE  Daily Treatment Note  NAME: Chasity Freeman  : 1960  MRN: [id number]    Date of Service: 2021    Discharge Recommendations:  Patient would benefit from continued therapy after discharge   PT Equipment Recommendations  Equipment Needed:  (TBD)    Assessment   Body structures, Functions, Activity limitations: Decreased functional mobility ; Decreased strength;Decreased endurance;Decreased safe awareness;Decreased balance;Decreased cognition  Treatment Diagnosis: impaired mobility due to weakness and balance issues  Specific instructions for Next Treatment: instruct in strengthening and balance activities, instruct in standing balance and transfers/ gait  as LE strength improves  History: admitted due to acute encephalopathy  REQUIRES PT FOLLOW UP: Yes  Activity Tolerance  Activity Tolerance: Patient limited by cognitive status     Patient Diagnosis(es): The encounter diagnosis was Acute encephalopathy. has a past medical history of Backache, unspecified, CHF (congestive heart failure) ([facility] [address] Chronic kidney disease, Coronary atherosclerosis of artery bypass graft, Cramp of limb, Gallstones, Hypertension, Insomnia, Pneumonia, Type II or unspecified type diabetes mellitus with renal manifestations, not stated as uncontrolled(250.40), Type II or unspecified type diabetes mellitus without mention of complication, not stated as uncontrolled, and Unspecified vitamin D deficiency. has a past surgical history that includes Coronary artery bypass graft; Knee arthroscopy; Carpal tunnel release; Breast surgery; Tonsillectomy; Hand surgery; Ankle fracture surgery; Cholecystectomy, open (N/A); and IR TUNNELED CVC PLACE WO SQ PORT/PUMP > 5 YEARS (2021).     Restrictions  Restrictions/Precautions  Restrictions/Precautions: Fall Risk (peripheral IV left forearm, central line right, external uri)  Required Braces or Orthoses?: Yes (Lymphedema wraps)  Required Braces or Orthoses  Right Lower Extremity Brace:  (Lymphedema wraps)  Left Lower Extremity Brace:  (Lymphedema wraps)  Position Activity Restriction  Other position/activity restrictions: up w/ assist  Subjective   General  Chart Reviewed: Yes  Response To Previous Treatment: Patient with no complaints from previous session. Family / Caregiver Present: Yes (2 sisters )  Referring Practitioner: Dr. Ivana Valentiner: Patient in bed agreeable to therapy   General Comment  Comments: DEDE Salinas approved therapy. Patient becomes anxious with movement and writer gave emotional support throughout treatment and patient was able to leep anxiety level down throughout treatment   Pain Screening  Patient Currently in Pain: Denies  Vital Signs  Patient Currently in Pain: Denies  Oxygen Therapy  O2 Device: None (Room air)  Patient Observation  Observations: patient stating that her R leg has increased sensation care home down the calf that wraps all the way around to the front of her shin. Patient also states that her R leg is feeling \"heavy\" whiach is making it difficult to move        Orientation  Orientation  Overall Orientation Status: Impaired  Objective   Bed mobility  Rolling to Left: Stand by assistance  Rolling to Right: Stand by assistance  Supine to Sit: Stand by assistance  Sit to Supine: Stand by assistance  Scooting: Stand by assistance  Transfers  Sit to Stand: Contact guard assistance  Stand to sit: Contact guard assistance  Bed to Chair: Contact guard assistance  Stand Pivot Transfers: Contact guard assistance  Ambulation  Ambulation?: Yes  Ambulation 1  Surface: level tile  Device: Rolling Walker  Assistance: Contact guard assistance  Quality of Gait: slight foward flexed posture with short step lengthb   Gait Deviations: Slow Annie; Increased ELISABET; Decreased step length;Decreased step height  Distance: 10ft from bed to toilet; 15ft from toilet to foot of bed and back to bed

## 2021-09-14 NOTE — PROGRESS NOTES
(1)    Subjective  Subjective: Pt resting in bed with family in room upon arrival. Pt was confused but agreeable to OT/PT eval.   Comments: Ok per [employer] for OT/PT eval  Overall Orientation Status: Impaired  Orientation Level: Oriented to place, Disoriented to person, Disoriented to time, Disoriented to situation  Vision  Vision: Impaired  Vision Exceptions: Wears glasses for reading  Hearing  Hearing: Within functional limits  Social/Functional History  Lives With: Other (comment) (Lives with mother)  Type of Home:  (Condo)  Home Layout: One level  Home Access: Ramped entrance  Bathroom Shower/Tub: Walk-in shower, Doors (Threshold to step over)  Bathroom Toilet: Handicap height  Bathroom Equipment: Grab bars in shower, Built-in shower seat (Pt. reports using wall of shower to get off toilet)  Home Equipment: 4 wheeled walker, Cane, Sock aid  ADL Assistance: [facility] [address]: Needs assistance (Pt. uses motorized cart at the grocery store, sister does grocery shopping typically. 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 sleeps in flat bed at home,  Additional Comments: Pt recently discharged from [address] [place] [address] at [facility] on 8- w/ home health was set up for PT, OT and Speech. Pt was going to dialysis 3 X/ week. .  Pt is a confused- above information was taken from PT evaluation dated 8-. Pt.'s mother becoming less mobile, pt.'s sister helps out mother.  Pt. reports that 2 sisters can assist. Pt.s father in [facility]. Pt reports that her 2 sisters live close by and that her one sister is retired and able to assist as needed. Objective      Cognition  Overall Cognitive Status: Impaired  Attention Span: Attends with cues to redirect  Memory: Decreased recall of biographical information, Decreased recall of recent events, Decreased long term memory, Decreased short term memory  Following Commands: Follows one step commands with increased time  Safety Judgement: Decreased awareness of need for safety  Awareness of Errors: Decreased awareness of errors  Insights: Decreased awareness of deficits  Sequencing and Organization: Assistance required to identify errors made, Assistance required to generate solutions, Assistance required to implement solutions   Sensation  Overall Sensation Status: Impaired (Numbness in B feet/calves 2* diabetic neuropathy)   ADL  Feeding: Contact guard assistance  Grooming: Contact guard assistance  UE Bathing: Moderate assistance  LE Bathing: Maximum assistance  UE Dressing: Moderate assistance  LE Dressing: Dependent/Total  Toileting: Dependent/Total (Brief/external catheter)  Additional Comments: ADL scores based on clinical reasoning and skilled observation unless otherwise noted. Pt unable to don bilateral socks at this time. Pt reports increased dizziness/feeling foggy while sitting and with bending fowards. Pt demonstrated increased anxiety while standing only able to tolerate short amount of time with 2 person A. Pt currently limited due to decreased strength, ROM, balance, activity tolerance, coordination, and cognitive barriers impacting safety and independence with self care tasks.      UE Function           LUE Strength  L Hand General: 4/5  LUE Strength Comment: Grossly 4/5     LUE Tone: Normotonic     LUE AROM (degrees)  LUE AROM : WFL     Left Hand AROM (degrees)  Left Hand AROM: WFL  RUE Strength  R Hand General: 3/5      RUE Tone: Normotonic  RUE PROM (degrees)  RUE PROM: WFL  RUE General PROM: Pt required tacticle cueing and increased time to complete ROM with RUE        Right Hand AROM (degrees)  Right Hand AROM: WFL  Right Hand General AROM: With increased time and verbal cues to complete    Fine Motor Skills  Coordination  Movements Are Fluid And Coordinated: No  Coordination and Movement description: Fine motor impairments, Gross motor impairments, Right UE, Decreased speed, Decreased accuracy                           Mobility  Supine to Sit: Moderate assistance  Sit to Supine: Moderate assistance, Minimal assistance (Mod A for BLE management; Min A at trunk)       Balance  Sitting Balance: Contact guard assistance (CGA-SBA)  Standing Balance: Moderate assistance (x2)  Standing Balance  Time: ~30 seconds  Activity: standing EOB  Comment: with RW and 2 person A. Pt demonstrated right knee buckling while standing. Pt demonstrated increased anxiety and fear of falling while standing. Functional Mobility  Functional Mobility Comments: Functional mobility deferred due to weakness and fear of falling  Bed mobility  Rolling to Right: Moderate assistance  Supine to Sit: Moderate assistance  Sit to Supine: Moderate assistance;Minimal assistance (Mod A for BLE management; Min A at trunk)  Scooting: Stand by assistance (scoot towards HOB while sitting EOB)  Comment: Pt required increased time to complete bed mobility. Pt reports that right side was feeling heavy. Pt sat EOB with CGA-SBA due to increased dizziness/feeling foggy. Transfers  Sit to stand: 2 Person assistance, Moderate assistance  Stand to sit: 2 Person assistance, Moderate assistance  Transfer Comments: Verbal cues for hand placement and safety. Pt demonstrated right knee buckling. Pt demonstrated increased anxiety/fear of falling while standing. Functional Activity Tolerance  Functional Activity Tolerance:  Tolerates 30 min exercise with multiple rests   Assessment  Performance deficits / Impairments: Decreased ADL status, Decreased functional mobility , Decreased ROM, Decreased strength, Decreased safe awareness, Decreased cognition, Decreased endurance, Decreased balance, Decreased high-level IADLs, Decreased fine motor control, Decreased coordination  Treatment Diagnosis: Impaired self care status  Prognosis: Good  Decision Making: Medium Complexity  REQUIRES OT FOLLOW UP: Yes  Discharge Recommendations: Patient would benefit from continued therapy after discharge  Activity Tolerance: Treatment limited secondary to decreased cognition         Functional Outcome Measures  AM-PAC Daily Activity Inpatient   How much help for putting on and taking off regular lower body clothing?: A Lot  How much help for Bathing?: A Lot  How much help for Toileting?: Total  How much help for putting on and taking off regular upper body clothing?: A Lot  How much help for taking care of personal grooming?: A Little  How much help for eating meals?: A Little  AM-[facility] Inpatient Daily Activity Raw Score: 13  AM-PAC Inpatient ADL T-Scale Score : 32.03  ADL Inpatient CMS 0-100% Score: 63.03  ADL Inpatient CMS G-Code Modifier : CL       Goals  Short term goals  Time Frame for Short term goals: By discharge  Short term goal 1: Pt will complete lower body dressing/bathing with mod A and good safety with use of AE as needed  Short term goal 2: Pt will complete upper body dressing/bathing with min A and good attention to task  Short term goal 3: Pt will complete functional transfers/mobility during self care tasks with min A and good safety with use of least restrictive device  Short term goal 4: Pt will tolerate standing 3+ minutes during functional activity of choice with min A and good safety  Short term goal 5: Pt will participate in 15+ minutes of therapeutic exercises/functional activities to increase safety and independence with self care and mobility    Plan  Safety Devices  Safety Devices in place: Yes  Type of devices: All fall risk precautions in place, Bed alarm in place, Call light within reach, Gait belt, Patient at risk for falls, Left in bed, Nurse notified     Plan  Times per week: 5-7  Current Treatment Recommendations: Self-Care / ADL, Strengthening, ROM, Balance Training, Functional Mobility Training, Endurance Training, Neuromuscular Re-education, Cognitive Reorientation, Pain Management, Safety Education & Training, Patient/Caregiver Education & Training, Equipment Evaluation, Education, & procurement, Home Management Training, Cognitive/Perceptual Training       Equipment Recommendations  Equipment Needed:  (TBD)  OT Individual Minutes  Time In: 0901  Time Out: 6929  Minutes: 42    Electronically signed by Angela Subramanian OT on 9/14/21 at 11:24 AM EDT

## 2021-09-14 NOTE — FLOWSHEET NOTE
09/14/21 1424   Encounter Summary   Services provided to: Patient   Referral/Consult From: Nhi   Continue Visiting   (9-14-21)   Complexity of Encounter Low   Length of Encounter 15 minutes   Spiritual/Protestant   Type Ritual   Intervention Anointing   Sacraments   Sacrament of Sick-Anointing Anointed  (Fr Erickson 9-14-21)

## 2021-09-14 NOTE — FLOWSHEET NOTE
Pt's sister Aureliano Calderon is with her and they are facetiming sister Fer Andrade. Patient is frightened as she has no memory of very much. She doesn't recognize her sisters, doesn't know what Covid is, etc. Speech is fine and no other impact. Writer provided listening presence and emotional support. 09/13/21 2000   Encounter Summary   Services provided to: Patient and family together   Referral/Consult From: [facility] Family members   Continue Visiting   (9-13-21)   Complexity of Encounter High   Length of Encounter 15 minutes   Spiritual/Anglican   Type Spiritual support   Assessment Tearful; Anxious   Intervention Active listening;Explored feelings, thoughts, concerns;Sustaining presence/ Ministry of presence; Discussed illness/injury and it's impact   Outcome Expressed gratitude;Engaged in conversation;Expressed feelings/needs/concerns;Receptive;Venting emotion

## 2021-09-14 NOTE — ED NOTES
Report given to DEDE Catherine from ED. Report method by phone   The following was reviewed with receiving RN:   Current vital signs:  BP (!) 160/52   Pulse 51   Temp 98.1 °F (36.7 °C) (Oral)   Resp 17   Ht 5' 5\" (1.651 m)   Wt 236 lb (107 kg)   SpO2 95%   BMI 39.27 kg/m²                MEWS Score: 0     Any medication or safety alerts were reviewed. Any pending diagnostics and notifications were also reviewed, as well as any safety concerns or issues, abnormal labs, abnormal imaging, and abnormal assessment findings. Informed pt heading for MRI, will transport pt to room after MRI completed. Questions were answered.             Geovanna Gatica RN  09/13/21 3081

## 2021-09-14 NOTE — PLAN OF CARE
Problem: Falls - Risk of:  Goal: Will remain free from falls  Description: Will remain free from falls  Outcome: Ongoing  Goal: Absence of physical injury  Description: Absence of physical injury  Outcome: Ongoing     Problem: Skin Integrity:  Goal: Will show no infection signs and symptoms  Description: Will show no infection signs and symptoms  Outcome: Ongoing  Goal: Absence of new skin breakdown  Description: Absence of new skin breakdown  Outcome: Ongoing  Goal: Complications related to intravenous access or infusion will be avoided or minimized  Description: Complications related to intravenous access or infusion will be avoided or minimized  Outcome: Ongoing     Problem: ACTIVITY INTOLERANCE/IMPAIRED MOBILITY  Goal: Mobility/activity is maintained at optimum level for patient  Outcome: Ongoing     Problem: COMMUNICATION IMPAIRMENT  Goal: Ability to express needs and understand communication  Outcome: Ongoing     Problem: Physical Regulation:  Goal: Will remain free from infection  Description: Will remain free from infection  Outcome: Ongoing

## 2021-09-14 NOTE — FLOWSHEET NOTE
Patient was having EEG; writer talked with pt's sister Olaf Chong who gave writer medical update. Patient was remembering random things this morning, so they are all hopeful.      09/14/21 1408   Encounter Summary   Services provided to: Family   Referral/Consult From: [facility]   (9-14-21)   Complexity of Encounter Moderate   Length of Encounter 15 minutes   Routine   Type Follow up   Spiritual/Mu-ism   Type Spiritual support   Assessment Calm; Approachable   Intervention Active listening;Explored feelings, thoughts, concerns;Sustaining presence/ Ministry of presence; Discussed illness/injury and it's impact   Outcome Expressed gratitude;Engaged in conversation;Expressed feelings/needs/concerns; Hopeful;Encouraged

## 2021-09-14 NOTE — CONSULTS
complication, not stated as uncontrolled     Unspecified vitamin D deficiency          Past Surgical History:        Procedure Laterality Date    ANKLE FRACTURE SURGERY      BREAST SURGERY      CARPAL TUNNEL RELEASE      CHOLECYSTECTOMY, OPEN N/A     CORONARY ARTERY BYPASS GRAFT      x3    HAND SURGERY      IR TUNNELED CATHETER PLACEMENT GREATER THAN 5 YEARS  8/18/2021    IR TUNNELED CATHETER PLACEMENT GREATER THAN 5 YEARS 8/18/2021 [facility] PROCEDURES    KNEE ARTHROSCOPY      right    TONSILLECTOMY         Current Medications:    potassium chloride (KLOR-CON M) extended release tablet 40 mEq, PRN   Or  potassium bicarb-citric acid (EFFER-K) effervescent tablet 40 mEq, PRN   Or  potassium chloride 10 mEq/100 mL IVPB (Peripheral Line), PRN  0.9 % sodium chloride infusion, Continuous  sodium chloride flush 0.9 % injection 5-40 mL, 2 times per day  sodium chloride flush 0.9 % injection 5-40 mL, PRN  0.9 % sodium chloride infusion, PRN  acetaminophen (TYLENOL) tablet 650 mg, Q4H PRN  ondansetron (ZOFRAN-ODT) disintegrating tablet 4 mg, Q8H PRN   Or  ondansetron (ZOFRAN) injection 4 mg, Q6H PRN  acetaminophen (TYLENOL) tablet 650 mg, Q4H PRN  aspirin chewable tablet 81 mg, Daily  ranolazine (RANEXA) extended release tablet 1,000 mg, BID  busPIRone (BUSPAR) tablet 7.5 mg, TID  apixaban (ELIQUIS) tablet 5 mg, BID  gabapentin (NEURONTIN) capsule 300 mg, BID  insulin glargine (LANTUS) injection vial 53 Units, BID  atorvastatin (LIPITOR) tablet 80 mg, Daily  carvedilol (COREG) tablet 6.25 mg, BID  furosemide (LASIX) tablet 80 mg, BID  febuxostat (ULORIC) tablet 40 mg, Daily  ferrous sulfate (IRON 325) tablet 325 mg, BID WC  polyethylene glycol (GLYCOLAX) packet 17 g, Daily PRN  senna (SENOKOT) tablet 17.2 mg, Daily PRN  tamsulosin (FLOMAX) capsule 0.4 mg, Daily  pantoprazole (PROTONIX) tablet 40 mg, QAM AC  traZODone (DESYREL) tablet 75 mg, Nightly  insulin lispro (HUMALOG) injection vial 0-12 Units, TID WC  insulin lispro (HUMALOG) injection vial 0-6 Units, Nightly  glucose (GLUTOSE) 40 % oral gel 15 g, PRN  dextrose 50 % IV solution, PRN  glucagon (rDNA) injection 1 mg, PRN  dextrose 5 % solution, PRN        Allergies:  Adhesive tape, Ace inhibitors, Iv dye [iodides], and Metformin and related    Social History:    Social History     Socioeconomic History    Marital status: Single     Spouse name: Not on file    Number of children: Not on file    Years of education: Not on file    Highest education level: Not on file   Occupational History    Not on file   Tobacco Use    Smoking status: Never Smoker    Smokeless tobacco: Never Used   Substance and Sexual Activity    Alcohol use: No    Drug use: No    Sexual activity: Not Currently   Other Topics Concern    Not on file   Social History Narrative    Not on file     Social Determinants of Health     Financial Resource Strain: Low Risk     Difficulty of Paying Living Expenses: Not hard at all   Food Insecurity:     Worried About [address] in the Last Year:     Ran Out of Food in the Last Year:    Transportation Needs:     Lack of Transportation (Medical):  Lack of Transportation (Non-Medical):    Physical Activity:     Days of Exercise per Week:     Minutes of Exercise per Session:    Stress:     Feeling of Stress :    Social Connections:     Frequency of Communication with Friends and Family:     Frequency of Social Gatherings with Friends and Family:     Attends Pentecostal Services:     Active Member of Clubs or Organizations:     Attends Club or Organization Meetings:     Marital Status:    Intimate Partner Violence:     Fear of Current or Ex-Partner:     Emotionally Abused:     Physically Abused:     Sexually Abused:        Family History:   Family History   Problem Relation Age of Onset    Diabetes Father     Heart Failure Father        Review of Systems:    Pertinent positives stated above in HPI.  All other systems were reviewed and were negative. Objective:  Constitutional:    CURRENT TEMPERATURE:  Temp: 97.3 °F (36.3 °C)  MAXIMUM TEMPERATURE OVER 24HRS:  Temp (24hrs), Av.5 °F (36.4 °C), Min:97.3 °F (36.3 °C), Max:97.7 °F (36.5 °C)    CURRENT RESPIRATORY RATE:  Resp: 16  CURRENT PULSE:  Pulse: 54  CURRENT BLOOD PRESSURE:  BP: 135/60  24HR BLOOD PRESSURE RANGE:  Systolic (37GRB), YII:154 , Min:73 , YKC:902   ; Diastolic (06QHU), FQL:65, Min:49, Max:60    24HR INTAKE/OUTPUT:      Intake/Output Summary (Last 24 hours) at 2021 1520  Last data filed at 2021 0849  Gross per 24 hour   Intake 120 ml   Output 650 ml   Net -530 ml           Physical Exam:  GENERAL APPEARANCE: Alert and cooperative, and appears to be in no acute distress. HEAD: normocephalic  EYES: PERRL, EOMI. Not pale, anicteric   NOSE:  No nasal discharge. THROAT:  Oral cavity and pharynx normal. Moist  NECK: Neck supple, non-tender without lymphadenopathy, masses or thyromegaly. JVD-neg  CARDIAC: Normal S1 and S2. No S3, S4 or murmurs. Rhythm is regular. LUNGS: Clear to auscultation and percussion without rales, rhonchi, wheezing or diminished breath sounds. ABD-Soft non distended, BS+ Non tender no organomegally  BACK: Examination of the spine reveals  no spinal deformity, without tenderness,   MUSKULOSKELETAL: Adequately aligned spine. No joint erythema or tenderness. EXTREMITIES:1+edema. Peripheral pulses intact. NEURO:Alert oriented x 3 , unable to move left lower and upper extremity.       Labs:  PTH:  No results found for: PTH  abs:   CBC:   Recent Labs     21  1409 21  0425   WBC 6.5 6.0   RBC 3.97* 3.57*   HGB 12.8 11.3*   HCT 37.7 34.0*   MCV 94.9 95.3   MCH 32.2 31.6   MCHC 33.9 33.2   RDW 14.9 14.8    159   MPV 8.3 8.2      BMP:   Recent Labs     21  1409 21  0554    142   K 3.8 3.5*    106   CO2 25 24   BUN 22 25*   CREATININE 1.77*  1.94* 2.20*   GLUCOSE 174* 142*   CALCIUM 9.1 8.5* Phosphorus:  No results for input(s): PHOS in the last 72 hours. Magnesium: No results for input(s): MG in the last 72 hours. Albumin:   Recent Labs     09/13/21  1409 09/14/21  0554   LABALBU 4.0 3.1*     Assessment:  1. Acute kidney injury superimposed on chronic kidney disease stage IV differentials secondary to ischemic ATN dialysis dependent Monday Wednesday Friday by way of a right IJ tunneled catheter. patient started on acute hemodialysis at [facility] on 8/6/2021. Patient has ongoing indications for intermittent hemodialysis.       2. Systemic hypertension - blood pressure control is adequate.     3. S/p recent left frontal ischemic stroke with right-sided weakness. Continue physical therapy exercises.     4.  Peripheral edema - continue furosemide 80 mg p.o. twice daily.     5. Altered mental status/syncopal episode borderline hypotension-logic evaluation ongoing and MRA head and MRI brain showed no acute abnormality. 6. Hypokalemia    Plan:  Continue  Monday/wedensday/friday schedule  Follow neurology recommendation  Continue to monitor GFR and urine output closely for evidence of renal function. Basic metabolic panel a.m. Renal Diet with 1500 mils fluid restriction  Avoid any aggressive ultrafiltration with dialysis and avoid hypotension. Obtain flow sheet and records from her outpatient dialysis center. Give 20 meq of KCl p.o.           eSnait Collazo M.D, [facility]  Nephrologist    Thank you for the consultation.

## 2021-09-14 NOTE — PROGRESS NOTES
Patient admitted to room 2122 from ED by stretcher. Assessment vitals complete. Patient oriented to room.

## 2021-09-14 NOTE — CONSULTS
[facility]   IN-PATIENT SERVICE      NEUROLOGY CONSULT  NOTE            Date:   9/14/2021  Patient name:  Raine Bueno  Date of admission:  9/13/2021  YOB: 1960      Chief Complaint:     Chief Complaint   Patient presents with    Altered Mental Status       Reason for Consult:      Patient had an episode of altered mental status occurring yesterday. History of Present Illness: The patient is a 59 y.o. female who presents with Altered Mental Status  . The patient was seen and examined and the chart was reviewed. Patient is right handed. Patient is seen with 2 sisters in the room. This patient tells me that she had a stroke in the beginning of August 2021 and was evaluated at [facility] for right arm and leg weakness. She states that an MRI performed demonstrated the presence of a stroke involving the left frontal lobe. She apparently made a good recovery from that episode. Patient also tells me that she had a new episode yesterday. During her initial evaluation for stroke at [facility] she had a CTA performed. Patient was noted to have kidney disease however the urgency of her possible stroke necessitated a CTA. CTA did not demonstrate an actionable vascular lesion. She is therefore in her 1st month of hemodialysis. She goes on to tell me that during the episode yesterday that she presented for hemodialysis at about 10:00 AM.  Patient thinks that was basically routine hemodialysis although there may have been a plan to remove slightly more fluid than had been done in the past.    Patient notes that she began to feel funny but could not exactly tell why. She stated that her head had a very mild headache frontally. The dialysis tech was calling her and attempting to stimulate her. She recalls that she could hear the tech but could not speak or move. She does recall seeing the  who transported her to [place].     Following this episode patient states that she had a complete loss of memory and could not recognize her sisters or her family. She cannot remember distant vital memories. She could recall relatively recent events however. Patient noted that she had a weak right arm and leg which she appeared to initially forget about until I asked her about it. He was then very concerned about this weakness. Results of MRI performed yesterday are as follows:    Impression   Normal examinations. Result of MRA:    Impression   No hemodynamically significant stenosis identified.  Possible mild narrowing   of the proximal most left ICA as detailed above.  If further evaluation is   desired, consider CTA or duplex ultrasound. MRA of head    Impression   Normal examinations. It is interesting that the above studies are completely normal while the MRA at [place] [id number] is reported as normal at least by the patient. Exact copies of the reports and images are not available to me at this time. Patient has also had cervical stenosis that was treated with plating. She has also had lumbar surgery for disc disease as well. She has had diabetes in excess of 30 years. She does have diabetic peripheral neuropathy notable from about mid shin distally. She has amputation of distal portion of the right great toe due to an injury and poor healing due to diabetes. Patient is also noted to have lymphedema.     Past Medical History:     Past Medical History:   Diagnosis Date    Backache, unspecified     CHF (congestive heart failure) (HCC)     Chronic kidney disease     Coronary atherosclerosis of artery bypass graft     Cramp of limb     Gallstones     Hypertension     Insomnia     Pneumonia     Type II or unspecified type diabetes mellitus with renal manifestations, not stated as uncontrolled(250.40)     Type II or unspecified type diabetes mellitus without mention of complication, not stated as uncontrolled     Unspecified vitamin D deficiency         Past Surgical History:     Past Surgical History:   Procedure Laterality Date    ANKLE FRACTURE SURGERY      BREAST SURGERY      CARPAL TUNNEL RELEASE      CHOLECYSTECTOMY, OPEN N/A     CORONARY ARTERY BYPASS GRAFT      x3    HAND SURGERY      IR TUNNELED CATHETER PLACEMENT GREATER THAN 5 YEARS  8/18/2021    IR TUNNELED CATHETER PLACEMENT GREATER THAN 5 YEARS 8/18/2021 [facility] SPECIAL PROCEDURES    KNEE ARTHROSCOPY      right    TONSILLECTOMY          Medications Prior to Admission:     Prior to Admission medications    Medication Sig Start Date End Date Taking? Authorizing Provider   insulin lispro, 1 Unit Dial, (HUMALOG KWIKPEN) 100 UNIT/ML SOPN Inject into the skin 3 times daily (before meals) Per sliding scale   Yes Historical Provider, MD   traZODone (DESYREL) 50 MG tablet Take 75 mg by mouth nightly   Yes Historical Provider, MD   pantoprazole (PROTONIX) 40 MG tablet TAKE 1 TABLET BY MOUTH EVERY MORNING BEFORE BREAKFAST 9/7/21  Yes AFSANEH Mejia - CNP   acetaminophen (TYLENOL) 325 MG tablet Take 2 tablets by mouth every 4 hours as needed for Pain 8/25/21  Yes Geraldo Muir MD   aspirin 81 MG chewable tablet Take 1 tablet by mouth daily 8/26/21  Yes Geraldo Muir MD   ranolazine (RANEXA) 1000 MG extended release tablet Take 1 tablet by mouth 2 times daily 8/25/21  Yes Geraldo Muir MD   busPIRone (BUSPAR) 7.5 MG tablet Take 1 tablet by mouth 3 times daily 8/25/21  Yes Geraldo Muir MD   gabapentin (NEURONTIN) 300 MG capsule Take 1 capsule by mouth 2 times daily for 30 days.  8/25/21 9/24/21 Yes Geraldo Muir MD   insulin glargine [facility]) 100 UNIT/ML injection pen Inject 53 Units into the skin 2 times daily 8/25/21  Yes Geraldo Muir MD   atorvastatin (LIPITOR) 80 MG tablet Take 1 tablet by mouth daily 8/25/21  Yes Geraldo Muir MD   carvedilol (COREG) 6.25 MG tablet Take 1 tablet by mouth 2 times daily 8/25/21  Yes Geraldo Muir MD   furosemide (LASIX) 80 MG tablet Take 1 tablet by mouth 2 times daily 8/26/21  Yes Jessica Kulkarni MD   febuxostat (ULORIC) 40 MG TABS tablet Take 1 tablet by mouth daily 8/26/21  Yes Jessica Kulkarni MD   ferrous sulfate (BRIAN-ARCHIE) 325 (65 Fe) MG tablet Take 1 tablet by mouth 2 times daily (with meals) 8/25/21  Yes Jessica Kulkarni MD   polyethylene glycol (GLYCOLAX) 17 g packet Take 17 g by mouth daily as needed for Constipation 8/25/21  Yes Jessica Kulkarni MD   tamsulosin [facility]) 0.4 MG capsule Take 1 capsule by mouth daily 8/26/21  Yes Jessica Kulkarni MD   ELIQUIS 5 MG TABS tablet Take 1 tablet by mouth 2 times daily 8/25/21   Jessica Kulkarni MD   blood glucose test strips (DEN CONTOUR TEST) strip 1 each by In Vitro route 3 times daily 8/25/21   Jessica Kulkarni MD   senna (SENOKOT) 8.6 MG tablet Take 2 tablets by mouth daily as needed (Constipation) 8/25/21   Jessica Kulkarni MD   Insulin Pen Needle (BD ULTRA-FINE PEN NEEDLES) 29G X 12.7MM MISC Use one needle for each insulin injection. 8/25/21   Jessica Kulkarni MD   Alcohol Swabs 70 % PADS Use an alcohol swab to cleanse the skin before checking your blood sugar and before each insulin injection. 8/25/21   Jessica Kulkarni MD   sharps container 1 each by Does not apply route as needed (dirty pen needles) 4/19/21   AFSANEH Duong CNP   allopurinol (ZYLOPRIM) 100 MG tablet Take 1 tablet by mouth daily 4/14/21   AFSANEH Duong CNP   Lancets 28G MISC Inject 1 each into the skin 3 times daily. 10/30/14   Nile Becerril MD        Allergies:     Adhesive tape, Ace inhibitors, Iv dye [iodides], and Metformin and related    Social History:     Tobacco:    reports that she has never smoked. She has never used smokeless tobacco.  Alcohol:      reports no history of alcohol use. Drug Use:  reports no history of drug use.     Family History:     Family History   Problem Relation Age of Onset    Diabetes Father     Heart Failure Father        Review of Systems: Review of systems indicated no additional problems such as bleeding chest pain palpitations shortness of breath or major dysfunction of arms and legs. Physical Exam:   /60   Pulse 54   Temp 97.3 °F (36.3 °C) (Oral)   Resp 16   Ht 5' 5\" (1.651 m)   Wt 240 lb 11.9 oz (109.2 kg)   SpO2 94%   BMI 40.06 kg/m²   Temp (24hrs), Av.5 °F (36.4 °C), Min:97.3 °F (36.3 °C), Max:97.7 °F (36.5 °C)        General examination:      General Appearance:  alert, well appearing, and in no acute distress  HEENT: Normocephalic, atraumatic  Psych: Great deal of anxiety. She has a history of anxiety. Neurological examination:    Mental status   Alert and oriented x 3; following all commands; speech is fluent, no dysarthria, aphasia. Memory testing was difficult and appeared abnormal.  She could not remember who her sisters were. They are reviewing her family history because she cannot recall any aspect of that. Nevertheless she could recount in fairly good detail visits by physicians and the events from yesterday except for the immediate period around her episode of syncope. Cranial nerves   II - visual fields intact to confrontation; pupils reactive. Pupil 4mm  III, IV, VI - extraocular muscles intact; no nystagmus; no ptosis   V - normal facial sensation                                                               VII - normal facial symmetry                                                             VIII - intact hearing                                                                             IX, X -phonation is normal                                                                     XII - midline tongue without atrophy or fasciculation     Motor function   motor testing showed that she appeared to have weakness of the right upper extremity and right leg. Her grasp was weak on the right compared to the left.   When manual testing for biceps and triceps was performed she demonstrate clasp knife giving way. This was with regard to the right upper extremity. Right lower extremity shows she can barely lift her leg from the bed. When I tested leg raising with my hand under her left heel no downward pressure by the left leg was detected while she was attempting to lift her right leg. This is also a classic finding of functional weakness. When testing finger-nose maneuvers, she could briskly move her right hand to her nose but could barely reach my finger on return. This is also classic functional impairment       All function the left side is normal.        Sensory function  patient did indicate loss of sensation below the knees at about mid shin symmetrically for vibration and tactile sense. This appears to be consistent with diabetic peripheral neuropathy. Cerebellar  no tremors or ataxia detected. Reflex function  trace/4 symmetric in upper extremities and absent at knees and ankles. . Downgoing plantar response bilaterally. Gait                   not tested at this time due to extreme loss of function of right lower extremity. Diagnostics:      Laboratory Testing:  CBC:   Recent Labs     09/13/21  1409 09/14/21  0425   WBC 6.5 6.0   HGB 12.8 11.3*    159     BMP:    Recent Labs     09/13/21  1409 09/14/21  0554    142   K 3.8 3.5*    106   CO2 25 24   BUN 22 25*   CREATININE 1.77*  1.94* 2.20*   GLUCOSE 174* 142*         Lab Results   Component Value Date    CHOL 105 04/09/2015    LDLCHOLESTEROL 72 12/26/2012    HDL 43 04/09/2015    TRIG 168 04/09/2015    ALT 10 09/14/2021    AST 13 09/14/2021    TSH 1.02 02/12/2014    INR 1.2 09/13/2021    LABA1C 8.9 (H) 03/15/2021    LABMICR 538 12/26/2012    GNMBVWTF44 459 12/26/2012       EEG: Pending at this time    I personally reviewed all of the above medications, clinical laboratory, imaging and other diagnostic tests. Impression:      Syncopal episode.   Consideration of possible episode of hypoperfusion

## 2021-09-14 NOTE — PROGRESS NOTES
Speech Language Pathology  Facility/Department: MED [facility]  Initial Speech/Language/Cognitive Assessment    NAME: Andi To  : 1960   MRN: [id number]  ADMISSION DATE: 2021  ADMITTING DIAGNOSIS: has Coronary artery disease; Chronic diastolic heart failure ([address] Diabetic polyneuropathy associated with type 2 diabetes mellitus ([address] History of coronary artery bypass graft; Iron deficiency anemia; Spinal stenosis of lumbar region with neurogenic claudication; Mixed hyperlipidemia; Stage 4 chronic kidney disease ([address] Type 2 diabetes mellitus with kidney complication, with long-term current use of insulin ([address] Syncope and collapse; Obesity, Class II, BMI 35-39.9; Thyroid nodule greater than or equal to 1 cm in diameter incidentally noted on imaging study; Essential hypertension; Anemia in stage 4 chronic kidney disease ([facility] Chronic ischemic heart disease; Acute cerebrovascular accident (CVA) ([facility] Stroke-like symptoms; and Morbid obesity with BMI of 40.0-44.9, [facility]) on their problem list.    Date of Eval: 2021   Evaluating Therapist: BRAYAN Moreno    RECENT RESULTS  CT OF HEAD/MRI:   - MRI brain- normal exam    Primary Complaint:   Per teleneurology resident H&P:   Ms. Coleen Crandall is a 56year-old female with past medical history significant for CAD, CHF, diabetes, hypertension, hyperlipidemia, end-stage renal disease on hemodialysis, previous left MCA stroke, atrial fibrillation on Eliquis presented today to [facility] ED after a syncopal episode. Apparently patient was receiving her dialysis.  At the end of the dialysis, patient had a syncopal episode. Chance Tuckerns regained consciousness within 3 to 4 minutes.  Patient was therefore sent to [address] presentation in ER, patient was noted to have some disorientation, right arm and leg weakness.  Some drift in right arm and right leg.  Patient was also found to be disoriented and had mild dysarthria.  Patient also had left lower extremity drift.   Therefore stroke alert was called. Pain:  Pain Assessment  Pain Level: 0    Assessment:      Diagnosis: Pt. demonstrates mild to moderate anomia (word finding difficulties) in conversation and in structured tasks. Pt. reports \"I am thinking so hard about things it hurts. \"  Pt. reports she remains unable to recognize family members by names/faces. Pt. demonstrated moderately reduced recall for 3 units of information. ST recommended to bring language and recall to a functional level. Pt. And her sister reporting progressive improvement in her acute reduced memory and aphasia since yesterday. Recommendations:  Requires SLP Intervention: Yes             Plan:   Goals:  Short-term Goals  Goal 1: Increase recall of 3 units of information to 70% accuracy  Goal 2: Increase word finding in conversation and in structured tasks to 70%. Patient/family involved in developing goals and treatment plan: Pt. Sisters x2 were present    Subjective:   Previous level of function and limitations:  Pt recently discharged from Rehab at [facility] on 8- w/ home health was set up for PT, OT and Speech. Pt was going to dialysis 3 X/ week. Pt.'s mother becoming less mobile, pt.'s sister helps out mother. Pt. reports that 2 sisters can assist. Pt.s father in [facility] home. Pt reports that her 2 sisters live close by and that her one sister is retired and able to assist as needed. Vision  Vision: Impaired  Vision Exceptions: Wears glasses for reading  Hearing  Hearing: Within functional limits           Objective:     Oral/Motor  Oral Motor:  Within functional limits    Auditory Comprehension  Comprehension: Within Functional Limits         Expression  Primary Mode of Expression: Verbal    Verbal Expression  Verbal Expression: Exceptions to functional limits  Confrontation:  (name objects around the room- 100%)  Convergent:  (convergent inferences: 100%, convergent categorization- 80%)  Divergent:  (divergent naming- 14 in 60 seconds)  Conversation:  (mild anomia in conversation)         Motor Speech  Motor Speech: Within Functional Limits         Cognition:      Orientation  Overall Orientation Status: Within Normal Limits  Attention  Attention: Within Functional Limits  Memory  Memory: Exceptions to [facility]  People Encountered: Moderate (Pt. able to recall this ST, name, occupation from previous ARU admission. Pt. reports she is unable to recall her family members names and faces.)  Short-term Memory: Mild (immediate recall 3 units- 100%, delayed recall 3 units- 67%)  Working Memory: Mild  Problem Solving  Problem Solving: Exceptions to [facility]  Simple Functional Tasks: Mild  Abstract Reasoning  Abstract Reasoning: Exceptions to [facility]  Convergent Thinking: Mild  Safety/Judgement  Safety/Judgement: Within Functional Limits      Education:  Patient Education Response: Verbalizes understanding          Therapy Time:   Individual Concurrent Group Co-treatment   Time In 1250         Time Out 1315         Minutes 25               Chrissy SPRINGER A.CCC/SLP    9/14/2021 1:31 PM

## 2021-09-14 NOTE — CARE COORDINATION
CASE MANAGEMENT NOTE:    Admission Date:  9/13/2021 Mallory Tapia is a 59 y.o.  female    Admitted for : Acute encephalopathy [G93.40]  Stroke-like symptoms [R29.90]    Met with:  Patient and Family    PCP:  Jaymie Oleary                                Insurance:  [employer]       Is patient alert and oriented at time of discussion: At times family helped     Current Residence/ Living Arrangements:  independently at home             Current Services PTA:  No    Does patient go to outpatient dialysis: Yes  If yes, location and chair time: Mallory Frederick on [address] at 11:15    Is patient agreeable to VNS: Yes    Freedom of choice provided:  Yes    List of [address] provided: Yes    VNS chosen:  Yes    DME:  straight cane and wheelchair    Home Oxygen: No    Nebulizer: No    CPAP/BIPAP: No    Supplier: N/A    Potential Assistance Needed: No    SNF needed: No    Freedom of choice and list provided: NA    Pharmacy:  [facility] on [place]       Does Patient want to use MEDS to BEDS? No    Is patient currently receiving oral anticoagulation therapy? Yes    Is the Patient an MARIVEL DOUGLAS [facility] with Readmission Risk Score greater than 14%? No  If yes, pt needs a follow up appointment made within 7 days. Family Members/Caregivers that pt would like involved in their care:    Yes    If yes, list name here:  [facility] and [employer]    Transportation Provider:  Family             Discharge Plan:  9/14/21 [employer] Pt is from home with her mom in a one story home she uses a walker, lymphedema boots and a cane she is agreeable to vns [facility] will send a referral pt is a dialysis pt [facility] on [facility] at 11:15 will continue to follow for needs . //tv                  Electronically signed by:  Grecia Ocampo RN on 9/14/2021 at 2:38 PM

## 2021-09-14 NOTE — PROGRESS NOTES
Physical Therapy    Facility/Department: [facility] PROGRESSIVE CARE  Initial Assessment    NAME: Nathalia Estrada  : 1960  MRN: [id number]    Date of Service: 2021    Discharge Recommendations:  Patient would benefit from continued therapy after discharge   PT Equipment Recommendations  Equipment Needed:  (TBD)    Assessment   Body structures, Functions, Activity limitations: Decreased functional mobility ; Decreased strength;Decreased endurance;Decreased safe awareness;Decreased balance;Decreased cognition  Assessment: continue per POC to maxmize potential for safe D/C  Treatment Diagnosis: impaired mobility due to weakness and balance issues  Specific instructions for Next Treatment: instruct in strengthening and balance activities, instruct in standing balance and transfers/ gait  as LE strength improves  Prognosis: Good  Decision Making: Medium Complexity  History: admitted due to acute encephalopathy  Exam: ROM, MMT, balance and mobility assessments  Clinical Presentation: mod x 1 for rolling to the right and supine > sit, mod x 2 sit <> stand, stood less than 1 minute using  wheeled walker w/ mod x 2- right knee buckling, FALL RISK  PT Education: Goals;PT Role;Plan of Care  Barriers to Learning: confusion  REQUIRES PT FOLLOW UP: Yes  Activity Tolerance  Activity Tolerance: Patient limited by cognitive status       Patient Diagnosis(es): The encounter diagnosis was Acute encephalopathy. has a past medical history of Backache, unspecified, CHF (congestive heart failure) ([address] Chronic kidney disease, Coronary atherosclerosis of artery bypass graft, Cramp of limb, Gallstones, Hypertension, Insomnia, Pneumonia, Type II or unspecified type diabetes mellitus with renal manifestations, not stated as uncontrolled(250.40), Type II or unspecified type diabetes mellitus without mention of complication, not stated as uncontrolled, and Unspecified vitamin D deficiency.    has a past surgical history that includes Coronary artery bypass graft; Knee arthroscopy; Carpal tunnel release; Breast surgery; Tonsillectomy; Hand surgery; Ankle fracture surgery; Cholecystectomy, open (N/A); and IR TUNNELED CVC PLACE WO SQ PORT/PUMP > 5 YEARS (8/18/2021). Restrictions  Restrictions/Precautions  Restrictions/Precautions: Fall Risk (peripheral IV left forearm, central line right, external urinary catheter, troponins 58 on 9-)  Required Braces or Orthoses?: Yes (Lymphedema wraps)  Required Braces or Orthoses  Right Lower Extremity Brace:  (Lymphedema wraps)  Left Lower Extremity Brace:  (Lymphedema wraps)  Position Activity Restriction  Other position/activity restrictions: up w/ assist  Vision/Hearing  Vision: Impaired  Vision Exceptions: Wears glasses for reading  Hearing: Within functional limits     Subjective  General  Patient assessed for rehabilitation services?: Yes  Response To Previous Treatment: Not applicable  Family / Caregiver Present: Yes (2 sisters)  Referring Practitioner: Dr. Medardo Russell  Referral Date : 09/13/21  Diagnosis: acute encephalopathy  Follows Commands: Impaired (confused)  Other (Comment): OK per nurse [facility] to proceed w/ PT evaluation  General Comment  Comments: Pt recently D/C from rehab on 8- after a lacunar infarct left frontal lobe. Per chart, pt had passed out during a dialysis treatment. Pt had C/O numbness on the right side and a \"heavy feeling\" after coming to. Subjective  Subjective: Pt is tearful and confused. Pt having difficulty remembering family members and her own name. States she knows her name is Kellen Mancera but only because it is written on the dry erase board. Pain Screening  Patient Currently in Pain: Denies  Vital Signs  Patient Currently in Pain: Denies       Orientation  Orientation  Overall Orientation Status: Impaired  Orientation Level: Oriented to place; Disoriented to time;Disoriented to situation;Disoriented to person  Social/Functional History  Social/Functional History  Lives With: Other (comment) (Lives with mother)  Type of Home:  (Condo)  Home Layout: One level  Home Access: Ramped entrance  Bathroom Shower/Tub: Walk-in shower, Doors (Threshold to step over)  Bathroom Toilet: Handicap height  Bathroom Equipment: Grab bars in shower, Built-in shower seat (Pt. reports using wall of shower to get off toilet)  Home Equipment: 4 wheeled walker, Cane, Sock aid  ADL Assistance: Independent  Homemaking Assistance: Needs assistance (Pt. uses motorized cart at the grocery store, sister does grocery shopping typically. 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 sleeps in flat bed at home,  Additional Comments: Pt recently discharged from [address] [place] [address] at [facility] on 8- w/ home health was set up for PT, OT and Speech. Pt was going to dialysis 3 X/ week. .  Pt is a confused- above information was taken from PT evaluation dated 8-. Pt.'s mother becoming less mobile, pt.'s sister helps out mother. Pt. reports that 2 sisters can assist. Pt.s father in [facility] home. Pt reports that her 2 sisters live close by and that her one sister is retired and able to assist as needed.   Cognition        Objective     Observation/Palpation  Observation: peripheral IV left forearm, central line right, external urinary catheter    AROM RLE (degrees)  RLE AROM: WFL  AROM LLE (degrees)  LLE AROM : WFL  AROM RUE (degrees)  RUE General AROM: see OT for UE assessment  AROM LUE (degrees)  LUE General AROM: see OT for UE assessment  Strength RLE  Comment: Grossly, 3+/5  Strength LLE  Comment: Grossly  4-/5  Strength RUE  Comment: see OT for UE assessment  Strength LUE  Comment: see OT for UE assessment     Sensation  Overall Sensation Status: Impaired (Numbness in B feet/calves 2* diabetic neuropathy)  Bed mobility  Rolling to Right: Moderate assistance  Supine to Sit: Moderate assistance  Sit to Supine: Moderate assistance;Minimal assistance;2 Person assistance (mod assist to lift legs and min assist at trunk)  Scooting: Stand by assistance (able to scoot alongside the bed using arms to boost self towards the [facility])  Comment: dangled at the EOB w/ CGA to SBA  Transfers  Sit to Stand: Moderate Assistance;2 Person Assistance  Stand to sit: Moderate Assistance;2 Person Assistance  Comment: pt stood next to the bed for less than 1 minute- pt's right knee buckling w/ standing- Pt fearful of falling and C/O weakness and heaviness of right LE  Ambulation  Ambulation?: No (deferred steps due to weakness right LE)     Balance  Sitting - Static: Good  Sitting - Dynamic: Good;-  Standing - Static: Fair;- (used wheeled walker w/ mod x 2)  Standing - Dynamic: Poor (used wheeled walker w/ mod x 2)        Plan   Plan  Times per week: 1-2 treatments/ day/ for 7 days  Times per day:  (1-2 treatments/ day/ for 7 days)  Specific instructions for Next Treatment: instruct in strengthening and balance activities, instruct in standing balance and transfers/ gait  as LE strength improves  Current Treatment Recommendations: Strengthening, Functional Mobility Training, Equipment Evaluation, Education, & procurement, Safety Education & Training, Gait Training, Transfer Training, Balance Training, Endurance Training, Positioning, Patient/Caregiver Education & Training  Safety Devices  Type of devices:  All fall risk precautions in place, Gait belt, Bed alarm in place, Call light within reach, Patient at risk for falls, Left in bed, Nurse notified (nurse Renzo Henao)    G-Code       OutComes Score                                                  AM-PAC Score     AM-PAC Inpatient Mobility without Stair Climbing Raw Score : 7 (09/14/21 0901)  AM-PAC Inpatient without Stair Climbing T-Scale Score : 28.66 (09/14/21 0901)  Mobility Inpatient CMS 0-100% Score: 86.29 (09/14/21 0901)  Mobility Inpatient without Stair CMS G-Code Modifier : CM (09/14/21 0901)       Goals  Short term goals  Time Frame for Short term goals: 1-2 treatments/ day/ for 7 days  Short term goal 1: pt to tolerate 1/2 hour  for therapuetic exercise and activity  Short term goal 2: pt to demonstrate good technique for LE strengthening and  balance activities  Short term goal 3: pt to demonstrate  rolling using the rail w/ min x 1 and supine <> sit w/ min x 1  Short term goal 4: pt to demonstrate good sitting balance at the EOB w/ good balance  Short term goal 5: pt to demonstrate sit <> stand transfers using Taisha Mungo initially and advancing to wheeled walker w/ min x 2  Short term goal 6: pt to advance to and demonstrate bed <> chair transfers using wheeled walker w/ min x 2  Short term goal 7: pt to advance to and demonstrate gait 20-30'  using wheeled walker w/ min x 2 and W/C follow  Patient Goals   Patient goals : get better       Therapy Time   Individual Concurrent Group Co-treatment   Time In 0901         Time Out 0943         Minutes 42         Timed Code Treatment Minutes: Metsa 21, PT

## 2021-09-15 LAB
ALBUMIN SERPL-MCNC: 3.2 G/DL (ref 3.5–5.2)
ALBUMIN/GLOBULIN RATIO: ABNORMAL (ref 1–2.5)
ALP BLD-CCNC: 95 U/L (ref 35–104)
ALT SERPL-CCNC: 10 U/L (ref 5–33)
ANION GAP SERPL CALCULATED.3IONS-SCNC: 12 MMOL/L (ref 9–17)
AST SERPL-CCNC: 14 U/L
BILIRUB SERPL-MCNC: 0.32 MG/DL (ref 0.3–1.2)
BUN BLDV-MCNC: 29 MG/DL (ref 8–23)
BUN/CREAT BLD: ABNORMAL (ref 9–20)
CALCIUM SERPL-MCNC: 8.8 MG/DL (ref 8.6–10.4)
CHLORIDE BLD-SCNC: 109 MMOL/L (ref 98–107)
CO2: 21 MMOL/L (ref 20–31)
CREAT SERPL-MCNC: 2.73 MG/DL (ref 0.5–0.9)
GFR AFRICAN AMERICAN: 21 ML/MIN
GFR NON-AFRICAN AMERICAN: 18 ML/MIN
GFR SERPL CREATININE-BSD FRML MDRD: ABNORMAL ML/MIN/{1.73_M2}
GFR SERPL CREATININE-BSD FRML MDRD: ABNORMAL ML/MIN/{1.73_M2}
GLUCOSE BLD-MCNC: 115 MG/DL (ref 65–105)
GLUCOSE BLD-MCNC: 149 MG/DL (ref 70–99)
GLUCOSE BLD-MCNC: 199 MG/DL (ref 65–105)
GLUCOSE BLD-MCNC: 288 MG/DL (ref 65–105)
HCT VFR BLD CALC: 31.6 % (ref 36–46)
HEMOGLOBIN: 10.4 G/DL (ref 12–16)
MCH RBC QN AUTO: 32.3 PG (ref 26–34)
MCHC RBC AUTO-ENTMCNC: 33 G/DL (ref 31–37)
MCV RBC AUTO: 97.9 FL (ref 80–100)
NRBC AUTOMATED: ABNORMAL PER 100 WBC
PDW BLD-RTO: 15 % (ref 11.5–14.9)
PHOSPHORUS: 2.5 MG/DL (ref 2.6–4.5)
PLATELET # BLD: 139 K/UL (ref 150–450)
PMV BLD AUTO: 8.9 FL (ref 6–12)
POTASSIUM SERPL-SCNC: 4.3 MMOL/L (ref 3.7–5.3)
RBC # BLD: 3.22 M/UL (ref 4–5.2)
SODIUM BLD-SCNC: 142 MMOL/L (ref 135–144)
TOTAL PROTEIN: 5.9 G/DL (ref 6.4–8.3)
WBC # BLD: 5.4 K/UL (ref 3.5–11)

## 2021-09-15 PROCEDURE — 5A1D70Z PERFORMANCE OF URINARY FILTRATION, INTERMITTENT, LESS THAN 6 HOURS PER DAY: ICD-10-PCS | Performed by: INTERNAL MEDICINE

## 2021-09-15 PROCEDURE — 2580000003 HC RX 258: Performed by: EMERGENCY MEDICINE

## 2021-09-15 PROCEDURE — 80053 COMPREHEN METABOLIC PANEL: CPT

## 2021-09-15 PROCEDURE — 92507 TX SP LANG VOICE COMM INDIV: CPT

## 2021-09-15 PROCEDURE — 97530 THERAPEUTIC ACTIVITIES: CPT

## 2021-09-15 PROCEDURE — 36415 COLL VENOUS BLD VENIPUNCTURE: CPT

## 2021-09-15 PROCEDURE — 36556 INSERT NON-TUNNEL CV CATH: CPT

## 2021-09-15 PROCEDURE — 97535 SELF CARE MNGMENT TRAINING: CPT

## 2021-09-15 PROCEDURE — 85027 COMPLETE CBC AUTOMATED: CPT

## 2021-09-15 PROCEDURE — 2580000003 HC RX 258: Performed by: FAMILY MEDICINE

## 2021-09-15 PROCEDURE — 2500000003 HC RX 250 WO HCPCS: Performed by: INTERNAL MEDICINE

## 2021-09-15 PROCEDURE — 97129 THER IVNTJ 1ST 15 MIN: CPT

## 2021-09-15 PROCEDURE — 6370000000 HC RX 637 (ALT 250 FOR IP): Performed by: FAMILY MEDICINE

## 2021-09-15 PROCEDURE — 99232 SBSQ HOSP IP/OBS MODERATE 35: CPT | Performed by: FAMILY MEDICINE

## 2021-09-15 PROCEDURE — 90935 HEMODIALYSIS ONE EVALUATION: CPT

## 2021-09-15 PROCEDURE — 84100 ASSAY OF PHOSPHORUS: CPT

## 2021-09-15 PROCEDURE — 97116 GAIT TRAINING THERAPY: CPT

## 2021-09-15 PROCEDURE — 97110 THERAPEUTIC EXERCISES: CPT

## 2021-09-15 PROCEDURE — 2060000000 HC ICU INTERMEDIATE R&B

## 2021-09-15 PROCEDURE — 82947 ASSAY GLUCOSE BLOOD QUANT: CPT

## 2021-09-15 RX ORDER — SODIUM CITRATE 4 % (5 ML)
1.9 SYRINGE (ML) MISCELLANEOUS PRN
Status: DISCONTINUED | OUTPATIENT
Start: 2021-09-15 | End: 2021-09-17 | Stop reason: HOSPADM

## 2021-09-15 RX ADMIN — INSULIN LISPRO 2 UNITS: 100 INJECTION, SOLUTION INTRAVENOUS; SUBCUTANEOUS at 18:04

## 2021-09-15 RX ADMIN — INSULIN GLARGINE 53 UNITS: 100 INJECTION, SOLUTION SUBCUTANEOUS at 08:54

## 2021-09-15 RX ADMIN — ASPIRIN 81 MG: 81 TABLET, CHEWABLE ORAL at 08:42

## 2021-09-15 RX ADMIN — TAMSULOSIN HYDROCHLORIDE 0.4 MG: 0.4 CAPSULE ORAL at 08:43

## 2021-09-15 RX ADMIN — GABAPENTIN 300 MG: 300 CAPSULE ORAL at 21:28

## 2021-09-15 RX ADMIN — PANTOPRAZOLE SODIUM 40 MG: 40 TABLET, DELAYED RELEASE ORAL at 05:36

## 2021-09-15 RX ADMIN — INSULIN LISPRO 3 UNITS: 100 INJECTION, SOLUTION INTRAVENOUS; SUBCUTANEOUS at 21:33

## 2021-09-15 RX ADMIN — FUROSEMIDE 80 MG: 40 TABLET ORAL at 08:43

## 2021-09-15 RX ADMIN — FEBUXOSTAT 40 MG: 40 TABLET, FILM COATED ORAL at 08:42

## 2021-09-15 RX ADMIN — ATORVASTATIN CALCIUM 80 MG: 80 TABLET, FILM COATED ORAL at 08:43

## 2021-09-15 RX ADMIN — APIXABAN 5 MG: 5 TABLET, FILM COATED ORAL at 08:41

## 2021-09-15 RX ADMIN — RANOLAZINE 1000 MG: 500 TABLET, FILM COATED, EXTENDED RELEASE ORAL at 21:28

## 2021-09-15 RX ADMIN — TRAZODONE HYDROCHLORIDE 75 MG: 50 TABLET ORAL at 21:28

## 2021-09-15 RX ADMIN — SODIUM CHLORIDE, PRESERVATIVE FREE 10 ML: 5 INJECTION INTRAVENOUS at 21:41

## 2021-09-15 RX ADMIN — RANOLAZINE 1000 MG: 500 TABLET, FILM COATED, EXTENDED RELEASE ORAL at 08:43

## 2021-09-15 RX ADMIN — BUSPIRONE HYDROCHLORIDE 7.5 MG: 5 TABLET ORAL at 21:29

## 2021-09-15 RX ADMIN — INSULIN GLARGINE 53 UNITS: 100 INJECTION, SOLUTION SUBCUTANEOUS at 21:33

## 2021-09-15 RX ADMIN — Medication 1.9 ML: at 14:45

## 2021-09-15 RX ADMIN — FUROSEMIDE 80 MG: 40 TABLET ORAL at 18:17

## 2021-09-15 RX ADMIN — GABAPENTIN 300 MG: 300 CAPSULE ORAL at 08:43

## 2021-09-15 RX ADMIN — BUSPIRONE HYDROCHLORIDE 7.5 MG: 5 TABLET ORAL at 08:42

## 2021-09-15 RX ADMIN — APIXABAN 5 MG: 5 TABLET, FILM COATED ORAL at 21:29

## 2021-09-15 RX ADMIN — ACETAMINOPHEN 650 MG: 325 TABLET ORAL at 21:28

## 2021-09-15 RX ADMIN — SODIUM CHLORIDE: 9 INJECTION, SOLUTION INTRAVENOUS at 05:36

## 2021-09-15 ASSESSMENT — PAIN DESCRIPTION - PAIN TYPE: TYPE: ACUTE PAIN

## 2021-09-15 ASSESSMENT — PAIN SCALES - GENERAL
PAINLEVEL_OUTOF10: 0
PAINLEVEL_OUTOF10: 5
PAINLEVEL_OUTOF10: 0
PAINLEVEL_OUTOF10: 0

## 2021-09-15 ASSESSMENT — PAIN DESCRIPTION - PROGRESSION: CLINICAL_PROGRESSION: NOT CHANGED

## 2021-09-15 ASSESSMENT — PAIN DESCRIPTION - LOCATION: LOCATION: GENERALIZED

## 2021-09-15 ASSESSMENT — PAIN DESCRIPTION - DESCRIPTORS: DESCRIPTORS: ACHING

## 2021-09-15 ASSESSMENT — PAIN DESCRIPTION - FREQUENCY: FREQUENCY: INTERMITTENT

## 2021-09-15 NOTE — PLAN OF CARE
Problem: Falls - Risk of:  Goal: Will remain free from falls  9/15/2021 1716 by Alma Foss RN  Outcome: Ongoing    Problem: Skin Integrity:  Goal: Will show no infection signs and symptoms  9/15/2021 1716 by Alma Foss RN  Outcome: Ongoing      Problem: Skin Integrity:  Goal: Absence of new skin breakdown  9/15/2021 1716 by Alma Foss RN  Outcome: Ongoing     Problem: Physical Regulation:  Goal: Will remain free from infection  9/15/2021 1716 by Alma Foss RN  Outcome: Ongoing

## 2021-09-15 NOTE — PROGRESS NOTES
HEMODIALYSIS POST TREATMENT NOTE    Treatment time ordered: 3Hrs    Actual treatment time: 3Hrs    UltraFiltration Goal: 2Kgs  UltraFiltration Removed: 2Kgs      Pre Treatment weight: 109.2Kgs  Post Treatment weight: 107Kgs  Estimated Dry Weight: Unknown at this time, papers requested from outpatient unit    Access used:     Central Venous Catheter:          Tunneled or Non-tunneled: Tunneled           Site: R chest          Access Flow: good      Internal Access:       AV Fistula or AV Graft: N/A         Site: N/A       Access Flow: N/A       Sign and symptoms of infection: No       If YES: N/A    Medications or blood products given: N/A    Chronic outpatient schedule: [facility]    Chronic outpatient unit: [facility]    Summary of response to treatment: Tolerated well with no issues throughout trx, tolerated 2kgs fluid removal    Explain if orders NOT met, was physician notified:N/A      ACES flowsheet faxed to patient unit/ placed in patient chart: yes    Post assessment completed: yes by Cato Bamberger, RN    Report given to: [id number] documented Safety Checks include the followin) Access and face visible at all times. 2) All connections and blood lines are secure with no kinks. 3) NVL alarm engaged. 4) Hemosafe device applied (if applicable). 5) No collapse of Arterial or Venous blood chambers. 6) All blood lines / pump segments in the air detectors.

## 2021-09-15 NOTE — PROGRESS NOTES
Nephrology ESRD Progress Note    Reason for Consult:  Management of dialysis dependent end-stage renal disease. Requesting Physician: Dr Janet Rodriguez    Interval history: Patient was seen and examined today and right-sided weakness is feeling better. She had EEG done which showed mild encephalopathy probably metabolic. She does not have shortness of breath or headache. History of Present Illness: This is a 59 y.o. female with past medical history of longstanding type 2 diabetes insulin-dependent diabetes mellitus, essential hypertension, coronary artery disease status post CABG in 2004, coronary artery stent in 2019, CHF with EF of 55%, history of mild to moderate LVH diastolic dysfunction, CKD 4 with baseline creatinine of about 2.2 to 2.4 mg/dL, patient initially presented to [facility] on 8/1/2021 with right-sided weakness, sudden onset patient was diagnosed with acute/subacute stroke in left frontal lobe with right-sided weakness, patient had CT angiogram during that admission and  developed acute kidney injury due to contrast-induced nephropathy requiring dialysis serum creatinine peaked to 4.9 mg/dL. Patient currently receives dialysis Monday Wednesday Friday under Dr. Mihai Jesus at [facility]. Patient presented from dialysis with altered mental status. She apparently had transient loss of consciousness but unclear the duration and was confused. She has numbness over the right lower extremity and cannot feel the right side of her body which is new. Patient had MRI of the brain which showed no acute changes. MRA of the neck showed no hemodynamically significant stenosis identified. Labs on admission showed a potassium of 3.8 with BUN/creatinine of 22 and 1.77 and albumin of 4.0.     Current Medications:    potassium chloride (KLOR-CON M) extended release tablet 40 mEq, PRN   Or  potassium bicarb-citric acid (EFFER-K) effervescent tablet 40 mEq, PRN   Or  potassium chloride 10 mEq/100 mL IVPB (Peripheral Line), PRN  0.9 % sodium chloride infusion, Continuous  sodium chloride flush 0.9 % injection 5-40 mL, 2 times per day  sodium chloride flush 0.9 % injection 5-40 mL, PRN  0.9 % sodium chloride infusion, PRN  acetaminophen (TYLENOL) tablet 650 mg, Q4H PRN  ondansetron (ZOFRAN-ODT) disintegrating tablet 4 mg, Q8H PRN   Or  ondansetron (ZOFRAN) injection 4 mg, Q6H PRN  acetaminophen (TYLENOL) tablet 650 mg, Q4H PRN  aspirin chewable tablet 81 mg, Daily  ranolazine (RANEXA) extended release tablet 1,000 mg, BID  busPIRone (BUSPAR) tablet 7.5 mg, TID  apixaban (ELIQUIS) tablet 5 mg, BID  gabapentin (NEURONTIN) capsule 300 mg, BID  insulin glargine (LANTUS) injection vial 53 Units, BID  atorvastatin (LIPITOR) tablet 80 mg, Daily  carvedilol (COREG) tablet 6.25 mg, BID  furosemide (LASIX) tablet 80 mg, BID  febuxostat (ULORIC) tablet 40 mg, Daily  ferrous sulfate (IRON 325) tablet 325 mg, BID WC  polyethylene glycol (GLYCOLAX) packet 17 g, Daily PRN  senna (SENOKOT) tablet 17.2 mg, Daily PRN  tamsulosin (FLOMAX) capsule 0.4 mg, Daily  pantoprazole (PROTONIX) tablet 40 mg, QAM AC  traZODone (DESYREL) tablet 75 mg, Nightly  insulin lispro (HUMALOG) injection vial 0-12 Units, TID WC  insulin lispro (HUMALOG) injection vial 0-6 Units, Nightly  glucose (GLUTOSE) 40 % oral gel 15 g, PRN  dextrose 50 % IV solution, PRN  glucagon (rDNA) injection 1 mg, PRN  dextrose 5 % solution, PRN      Objective:  Constitutional:    CURRENT TEMPERATURE:  Temp: 98.3 °F (36.8 °C)  MAXIMUM TEMPERATURE OVER 24HRS:  Temp (24hrs), Av.3 °F (36.8 °C), Min:98 °F (36.7 °C), Max:98.6 °F (37 °C)    CURRENT RESPIRATORY RATE:  Resp: 18  CURRENT PULSE:  Pulse: 61  CURRENT BLOOD PRESSURE:  BP: (!) 157/65  24HR BLOOD PRESSURE RANGE:  Systolic (20CMC), QRH:238 , Min:121 , TKV:967   ; Diastolic (29CAY), LXQ:99, Min:60, Max:65    24HR INTAKE/OUTPUT:      Intake/Output Summary (Last 24 hours) at 9/15/2021 1214  Last data filed at 9/15/2021 1452  Gross per 24 hour   Intake 3376 ml   Output --   Net 3376 ml     Physical Exam:  GENERAL APPEARANCE: Alert and cooperative, and appears to be in no acute distress. HEAD: normocephalic  NECK: Neck supple, non-tender without lymphadenopathy, masses or thyromegaly. JVD-neg  CARDIAC: Normal S1 and S2. No S3, S4 or murmurs. Rhythm is regular. LUNGS: Clear to auscultation and percussion without rales, rhonchi, wheezing or diminished breath sounds. ABDOMEN: Soft non distended, BS+ Non tender no organomegally  MUSKULOSKELETAL: Adequately aligned spine. No joint erythema or tenderness. EXTREMITIES:1+edema. Peripheral pulses intact. NEURO:Alert oriented x 3 , unable to move left lower and upper extremity. Labs:     CBC:   Recent Labs     09/13/21  1409 09/14/21  0425 09/15/21  0420   WBC 6.5 6.0 5.4   RBC 3.97* 3.57* 3.22*   HGB 12.8 11.3* 10.4*   HCT 37.7 34.0* 31.6*   MCV 94.9 95.3 97.9   MCH 32.2 31.6 32.3   MCHC 33.9 33.2 33.0   RDW 14.9 14.8 15.0*    159 139*   MPV 8.3 8.2 8.9      BMP:   Recent Labs     09/13/21  1409 09/14/21  0554 09/15/21  0420    142 142   K 3.8 3.5* 4.3    106 109*   CO2 25 24 21   BUN 22 25* 29*   CREATININE 1.77*  1.94* 2.20* 2.73*   GLUCOSE 174* 142* 149*   CALCIUM 9.1 8.5* 8.8      Albumin:   Recent Labs     09/13/21  1409 09/14/21  0554 09/15/21  0420   LABALBU 4.0 3.1* 3.2*     Assessment/Plan:    1. Chronic kidney disease stage IV - Patient remains dialysis dependent on a Ncheqf-Gixjdorkq-Fbigfm schedule via right IJ tunneled catheter. She was started on acute hemodialysis at [facility] on 8/6/2021. Patient has ongoing indications for intermittent hemodialysis. She will receive acute hemodialysis today.      2. Systemic hypertension - Continue current antihypertensive regimen.     3. S/p recent left frontal ischemic stroke with right-sided weakness.      4.  Peripheral edema - continue furosemide 80 mg p.o. twice daily.     5.  Altered mental status/syncopal episode - Associated with borderline hypotension. MRA head and MRI brain showed no acute abnormality. Neurology input is noted. We will continue physical and occupational therapy. Prognosis is guarded.     Taylor Bruce FACP  Attending Clinical Nephrologist    [facility] [place]  Telephone: [phone]

## 2021-09-15 NOTE — PROGRESS NOTES
FAMILY MEDICINE  - PROGRESS NOTE    Date:  9/15/2021  Celestino Peterson  [id number]      Chief Complaint   Patient presents with    Altered Mental Status         Interval History:  Improved, she is in good spirits this am. Her EEG shows only mild encephalopathy. MRA of head & neck & MRI were negative yesterday. No significant events overnight. Specialists notes, labs & imaging reviewed.       Subjective  Constitutional: positive for obesity  Musculoskeletal:positive for arthralgias, back pain and myalgias  Neurological: positive for memory problems, paresthesia and weakness  Endocrine: positive for diabetic symptoms including neuropathy and hyperglycemia:    Objective:    /65   Pulse 60   Temp 98 °F (36.7 °C) (Oral)   Resp 16   Ht 5' 5\" (1.651 m)   Wt 240 lb 11.9 oz (109.2 kg)   SpO2 91%   BMI 40.06 kg/m²   General appearance - alert, well appearing, and in no distress and overweight  Mental status - alert, oriented to person, place, and time  Eyes - pupils equal and reactive, extraocular eye movements intact  Ears - hearing grossly normal bilaterally  Nose - normal and patent, no erythema, discharge or polyps  Mouth - mucous membranes moist, pharynx normal without lesions  Neck - supple, no significant adenopathy  Lymphatics - no palpable lymphadenopathy, no hepatosplenomegaly  Chest - clear to auscultation, no wheezes, rales or rhonchi, symmetric air entry  Heart - normal rate, regular rhythm, normal S1, S2, no murmurs, rubs, clicks or gallops  Abdomen - soft, nontender, nondistended, no masses or organomegaly  Breasts - not examined  Back exam - not examined  Neurological - alert, oriented, normal speech, no focal findings or movement disorder noted  Musculoskeletal - osteoarthritic changes noted in both hands  Extremities - peripheral pulses normal, no pedal edema, no clubbing or cyanosis  Skin - normal coloration and turgor, no rashes, no suspicious skin lesions noted    Data:   Medications:   Current Facility-Administered Medications   Medication Dose Route Frequency Provider Last Rate Last Admin    potassium chloride (KLOR-CON M) extended release tablet 40 mEq  40 mEq Oral PRN Ariane Block MD   40 mEq at 09/14/21 1304    Or    potassium bicarb-citric acid (EFFER-K) effervescent tablet 40 mEq  40 mEq Oral PRN Ariane Block MD        Or    potassium chloride 10 mEq/100 mL IVPB (Peripheral Line)  10 mEq IntraVENous PRN Ariane Block MD        0.9 % sodium chloride infusion   IntraVENous Continuous Maryewa Lopez  mL/hr at 09/15/21 0536 New Bag at 09/15/21 0536    sodium chloride flush 0.9 % injection 5-40 mL  5-40 mL IntraVENous 2 times per day Ariane Block MD        sodium chloride flush 0.9 % injection 5-40 mL  5-40 mL IntraVENous PRN Ariane Block MD        0.9 % sodium chloride infusion  25 mL IntraVENous PRN Ariane Block MD        acetaminophen (TYLENOL) tablet 650 mg  650 mg Oral Q4H PRN Ariane Block MD        ondansetron (ZOFRAN-ODT) disintegrating tablet 4 mg  4 mg Oral Q8H PRN Ariane Block MD        Or    [facility]ans[facility]ron [facility] injection 4 mg  4 mg IntraVENous Q6H PRN Ariane Block MD        acetaminophen (TYLENOL) tablet 650 mg  650 mg Oral Q4H PRN Ariane Block MD        aspirin chewable tablet 81 mg  81 mg Oral Daily Ariane Block MD   81 mg at 09/14/21 0800    ranolazine (RANEXA) extended release tablet 1,000 mg  1,000 mg Oral BID Ariane Block MD   1,000 mg at 09/14/21 2044    busPIRone (BUSPAR) tablet 7.5 mg  7.5 mg Oral TID Ariane Block MD   7.5 mg at 09/14/21 2204    apixaban (ELIQUIS) tablet 5 mg  5 mg Oral BID Ariane Block MD   5 mg at 09/14/21 2044    gabapentin (NEURONTIN) capsule 300 mg  300 mg Oral BID Ariane Block MD   300 mg at 09/14/21 2043    insulin glargine (LANTUS) injection vial 53 Units  53 Units SubCUTAneous BID Ariane Block MD   50 Units at 09/14/21 2042    atorvastatin (LIPITOR) tablet 80 mg  80 mg Oral Daily Sarah Devries MD   80 mg at 09/14/21 0801    carvedilol (COREG) tablet 6.25 mg  6.25 mg Oral BID Sarah Devries MD   6.25 mg at 09/14/21 0801    furosemide (LASIX) tablet 80 mg  80 mg Oral BID Sarah Devries MD   80 mg at 09/14/21 1618    febuxostat (ULORIC) tablet 40 mg  40 mg Oral Daily Sarah Devries MD   40 mg at 09/14/21 8160    ferrous sulfate (IRON 325) tablet 325 mg  325 mg Oral BID  Sarah Devries MD        polyethylene glycol (GLYCOLAX) packet 17 g  17 g Oral Daily PRN Sarah Devries MD        senna (SENOKOT) tablet 17.2 mg  2 tablet Oral Daily PRN Sarah Devries MD   17.2 mg at 09/14/21 0952    tamsulosin (FLOMAX) capsule 0.4 mg  0.4 mg Oral Daily Sarah Devries MD   0.4 mg at 09/14/21 0800    pantoprazole (PROTONIX) tablet 40 mg  40 mg Oral QAM AC Sarah Devries MD   40 mg at 09/15/21 0536    traZODone (DESYREL) tablet 75 mg  75 mg Oral Nightly Sarah Devries MD   75 mg at 09/14/21 2044    insulin lispro (HUMALOG) injection vial 0-12 Units  0-12 Units SubCUTAneous TID  Sarah Devries MD   4 Units at 09/14/21 1750    insulin lispro (HUMALOG) injection vial 0-6 Units  0-6 Units SubCUTAneous Nightly Sarah Devries MD   3 Units at 09/14/21 2042    glucose (GLUTOSE) 40 % oral gel 15 g  15 g Oral PRN Sarah Devries MD        dextrose 50 % IV solution  12.5 g IntraVENous PRN Sarah Devries MD        glucagon (rDNA) injection 1 mg  1 mg IntraMUSCular PRN Sarah Devries MD        dextrose 5 % solution  100 mL/hr IntraVENous RUSTYN Sarah Devries MD           Intake/Output Summary (Last 24 hours) at 9/15/2021 0623  Last data filed at 9/15/2021 0534  Gross per 24 hour   Intake 3256 ml   Output --   Net 3256 ml     Recent Results (from the past 24 hour(s))   POC Glucose Fingerstick    Collection Time: 09/14/21  6:56 AM   Result Value Ref Range    POC Glucose 155 (H) 65 - 105 mg/dL   POC Spinal stenosis of lumbar region with neurogenic claudication     Mixed hyperlipidemia     Stage 4 chronic kidney disease (HCC)     Type 2 diabetes mellitus with kidney complication, with long-term current use of insulin (HCC)     Syncope and collapse     Obesity, Class II, BMI 35-39.9     Thyroid nodule greater than or equal to 1 cm in diameter incidentally noted on imaging study     Essential hypertension     Anemia in stage 4 chronic kidney disease (HCC)     Chronic ischemic heart disease     Acute cerebrovascular accident (CVA) ([facility] [address]     Stroke-like symptoms     Morbid obesity with BMI of 40.0-44.9, adult ([facility] [address]     Acute encephalopathy           Plan:  -Syncopal episode - acute stroke work up negative, possibly due to hypoperfusion during dialysis, continue monitoring. -RLE weakness with decreased sensation and functional weakness - family requesting evaluation by PM&R for admission to ARU.  -DM2 - has peripheral neuropathy which adds to her decreased sensation, carb control diet & SS coverage in pace. -AURELIA superimposed on CKD stage 4 - recently requiring hemodialysis, Nephrology managing, defer to their assessment about hypoperfusion.  -Continue current treatments.  -Complete orders per chart.       See orders   Disposition:    Electronically signed by Orlene Hatchet, MD on 9/15/2021 at 6:23 AM

## 2021-09-15 NOTE — PROGRESS NOTES
Physical Therapy  Facility/Department: [facility] PROGRESSIVE CARE  Daily Treatment Note  NAME: Alecia Pagan  : 1960  MRN: [id number]    Date of Service: 9/15/2021    Discharge Recommendations:  Patient would benefit from continued therapy after discharge   PT Equipment Recommendations  Equipment Needed:  (TBD)    Assessment   Body structures, Functions, Activity limitations: Decreased functional mobility ; Decreased strength;Decreased endurance;Decreased safe awareness;Decreased balance;Decreased cognition  Treatment Diagnosis: impaired mobility due to weakness and balance issues  Specific instructions for Next Treatment: instruct in strengthening and balance activities, instruct in standing balance and transfers/ gait  as LE strength improves  History: admitted due to acute encephalopathy  REQUIRES PT FOLLOW UP: Yes  Activity Tolerance  Activity Tolerance: Patient limited by cognitive status     Patient Diagnosis(es): The encounter diagnosis was Acute encephalopathy. has a past medical history of Backache, unspecified, CHF (congestive heart failure) ([facility] [address] Chronic kidney disease, Coronary atherosclerosis of artery bypass graft, Cramp of limb, Gallstones, Hypertension, Insomnia, Pneumonia, Type II or unspecified type diabetes mellitus with renal manifestations, not stated as uncontrolled(250.40), Type II or unspecified type diabetes mellitus without mention of complication, not stated as uncontrolled, and Unspecified vitamin D deficiency. has a past surgical history that includes Coronary artery bypass graft; Knee arthroscopy; Carpal tunnel release; Breast surgery; Tonsillectomy; Hand surgery; Ankle fracture surgery; Cholecystectomy, open (N/A); and IR TUNNELED CVC PLACE WO SQ PORT/PUMP > 5 YEARS (2021).     Restrictions  Restrictions/Precautions  Restrictions/Precautions: Fall Risk  Required Braces or Orthoses?: Yes  Required Braces or Orthoses  Right Lower Extremity Brace:  ( Lymphedema wraps)  Left Lower Extremity Brace:  ( Lymphedema wraps)  Position Activity Restriction  Other position/activity restrictions: up w/ assist  Subjective   General  Chart Reviewed: Yes  Missed reason: Conflicting appointment (patient in dialysis for second attempt in PM)  Response To Previous Treatment: Patient with no complaints from previous session. Family / Caregiver Present: No  Referring Practitioner: Dr. Marcos Pastures: Patient in bed upon arrival, getting herself cleaned up. Agreeable to therapy   General Comment  Comments: DEDE Guadalupe approved therapy   Pain Screening  Patient Currently in Pain: Denies  Vital Signs  Patient Currently in Pain: Denies  Oxygen Therapy  O2 Device: None (Room air)  Patient Observation  Observations: patient stating that her R leg has increased sensation snf down the calf that wraps all the way around to the front of her shin. Patient also states that her R leg is feeling \"heavy\" whiach is making it difficult to move        Orientation  Orientation  Overall Orientation Status: Within Functional Limits  Orientation Level: Oriented to place;Oriented to time;Oriented to person  Objective   Bed mobility  Rolling to Left: Supervision  Rolling to Right: Supervision  Supine to Sit: Supervision  Sit to Supine: Unable to assess  Scooting: Supervision  Transfers  Sit to Stand: Stand by assistance  Stand to sit: Stand by assistance  Bed to Chair: Stand by assistance  Stand Pivot Transfers: Stand by assistance  Ambulation  Ambulation?: Yes  Ambulation 1  Surface: level tile  Device: Rolling Walker  Assistance: Contact guard assistance  Quality of Gait: slight foward flexed posture with short step lengthb   Gait Deviations: Slow Annie; Increased ELISABET; Decreased step length;Decreased step height  Distance: 10ft from bed to toilet; 20ft from toilet to bedside chair  Stairs/Curb  Stairs?: No        Other exercises  Other exercises?: Yes  Other exercises 1: seated B LE exercises x15 reps with blue tband   Other exercises 2: bed mobility   Other exercises 4: toilet transfer; patient completed donning and doffing brief while standing, pericare and flsuhing toilet while standing with SBA   Other exercises 6: standing dynamic balance activties; flushing toilet and washing hands with no device      Goals  Short term goals  Time Frame for Short term goals: 1-2 treatments/ day/ for 7 days  Short term goal 1: pt to tolerate 1/2 hour  for therapuetic exercise and activity  Short term goal 2: pt to demonstrate good technique for LE strengthening and  balance activities  Short term goal 3: pt to demonstrate  rolling using the rail w/ min x 1 and supine <> sit w/ min x 1  Short term goal 4: pt to demonstrate good sitting balance at the EOB w/ good balance  Short term goal 5: pt to demonstrate sit <> stand transfers using [place] initially and advancing to wheeled walker w/ min x 2  Short term goal 6: pt to advance to and demonstrate bed <> chair transfers using wheeled walker w/ min x 2  Short term goal 7: pt to advance to and demonstrate gait 20-30'  using wheeled walker w/ min x 2 and W/C follow  Patient Goals   Patient goals : get better    Plan    Plan  Times per week: 1-2 treatments/ day/ for 7 days  Times per day:  (1-2 treatments/ day/ for 7 days)  Specific instructions for Next Treatment: instruct in strengthening and balance activities, instruct in standing balance and transfers/ gait  as LE strength improves  Current Treatment Recommendations: Strengthening, Functional Mobility Training, Equipment Evaluation, Education, & procurement, Safety Education & Training, Gait Training, Transfer Training, Balance Training, Endurance Training, Positioning, Patient/Caregiver Education & Training  Safety Devices  Type of devices:  All fall risk precautions in place, Gait belt, Bed alarm in place, Call light within reach, Patient at risk for falls, Left in bed, Nurse notified        09/15/21 3874   PT Individual Minutes   Time In [address]   Time Out [address]   Minutes 38     Electronically signed by Sheela Dill PTA on 9/15/21 at 2:48 PM EDT

## 2021-09-15 NOTE — FLOWSHEET NOTE
09/15/21 1502   Encounter Summary   Services provided to: Patient   Referral/Consult From: [facility]   (9/15/21V)   Volunteer Visit Yes   Complexity of Encounter Low   Length of Encounter 15 minutes   Spiritual/Yazidism   Type Spiritual support   Intervention Prayer

## 2021-09-15 NOTE — PLAN OF CARE
Problem: Falls - Risk of:  Goal: Will remain free from falls  Description: Will remain free from falls  9/15/2021 0321 by Marco Antonio Lobato RN  Outcome: Ongoing   Patient remained free of falls during this shift, hourly rounding completed to ensure safety  Problem: Skin Integrity:  Goal: Will show no infection signs and symptoms  Description: Will show no infection signs and symptoms  9/15/2021 0321 by Marco Antonio Lobato RN  Outcome: Ongoing   Patient assessed and no signs/symptoms of infection noted  Problem: ACTIVITY INTOLERANCE/IMPAIRED MOBILITY  Goal: Mobility/activity is maintained at optimum level for patient  9/15/2021 0321 by Marco Antonio Lobato RN  Outcome: Ongoing   Patient able to ambulate to and from bathroom with help of staff  Problem: COMMUNICATION IMPAIRMENT  Goal: Ability to express needs and understand communication  9/15/2021 0321 by Marco Antonio Lobato RN  Outcome: Ongoing   Patient utilizes call light for needs.   Problem: Neurological  Goal: Maximum potential motor/sensory/cognitive function  9/15/2021 0321 by Marco Antonio Lobato RN  Outcome: Ongoing   Patient remained alert, oriented and remains able to communicate needs to nursing staff

## 2021-09-15 NOTE — PROGRESS NOTES
HEMODIALYSIS PRE-TREATMENT NOTE    Patient Identifiers prior to treatment: Name//MRN    Isolation Required:  Yes                      Isolation Type: Contact       (please document if patient is being managed as a PUI/COVID-19 patient)        Hepatitis status:                           Date Drawn                             Result  Hepatitis B Surface Antigen 2021     neg                     Hepatitis B Surface Antibody 2021 pos        Hepatitis B Core Antibody 2021 neg          How was Hepatitis Status verified: epic chart     Was a copy of the labs you documented provided to facility for the patient's chart: yes    Hemodialysis orders verified: yes    Access Within normal limits ( I.e. s/s of infection,...): yes     Pre-Assessment completed: yes by Niles Rordiguez RN    Pre-dialysis report received from: [facility]                      Time: 11:30

## 2021-09-15 NOTE — PROCEDURES
EEG REPORT    Patient Name: Dominic Foss  Patient MRN: [id number]    Referring Physician: Neha Wing MD  Dictating Physician: Martha Swan MD  Date: 09/15/2021      CLINICAL HISTORY: This EEG was performed on a 59 y.o. female with The encounter diagnosis was Acute encephalopathy. . It is being performed to evaluate for seizure activity. CURRENT ANTI-EPILEPTIC MEDICATIONS:     DESCRIPTION: Wakefulness, drowsiness and sleep were obtained. During wakefulness there was a posterior background of well modulated, reactive, symmetrical, low voltage, 8 Hz activity. During drowsiness there was decreased amplitude and slower frequencies 4 to 5 Hz theta range. Technical quality of the EEG is good. Photic stimulation evoked limited posterior driving response. Hyperventilation not performed    During this tracing no sharp wave spikes or paroxysmal bursts of slow wave are identified. No epileptiform activity noted. Specific attention for focal changes with regard to be left hemisphere was made. No specific focal abnormality identified on the basis of this EEG. She we noted patient had a prior history of a lacunar infarct in the left hemisphere in August 2021. EKG recorded during this tracing demonstrated a regular rhythm approximately 60 bpm. Clinical correlation is suggested. EEG DIAGNOSIS: This EEG was abnormal due to the presence of:  1. Mild diffuse background slowing    CLINICAL INTERPRETATION: The mild background slowing in this EEG is indicative of a mild encephalopathy, of nonspecific origin. No epileptiform activity was present.       Martha Swan MD  Neurology

## 2021-09-15 NOTE — CARE COORDINATION
DISCHARGE PLANNING NOTE:    I spoke with patients sister Leslie Reyes and she is agreeable to [facility] and [place] [facility] [place] back in [place].     I will notify Shanell Magaña and obtain PM&R consult.      Electronically signed by Carlyn Blanco RN on 9/15/2021 at 2:32 PM

## 2021-09-15 NOTE — PROGRESS NOTES
[facility]    INPATIENT OCCUPATIONAL THERAPY  PROGRESS NOTE  Date: 9/15/2021  Patient Name: Terrence Cavanaugh      Room: 9480/6786-17  MRN: [id number]    : 1960  (62 y.o.) Gender: female     Discharge Recommendations:  Further Occupational Therapy is recommended upon facility discharge. Equipment Needed:  (TBD)    Referring Practitioner: Anju Burns MD  Diagnosis: Acute encephalopathy  General  Chart Reviewed: Yes  Patient assessed for rehabilitation services?: Yes  Family / Caregiver Present: Yes  Referring Practitioner: Anju Burns MD  Diagnosis: Acute encephalopathy    Restrictions  Restrictions/Precautions: Fall Risk  Other position/activity restrictions: up w/ assist  Required Braces or Orthoses  Right Lower Extremity Brace:  ( Lymphedema wraps)  Left Lower Extremity Brace:  ( Lymphedema wraps)  Required Braces or Orthoses?: Yes      Subjective  Subjective: Pt seated in recliner upon arrival.   Comments: Ok per RN Lorelei Rossi for OT, nurse ended session stating pt has dialysis scheduled. Pt started session trying to remember her bathroom at home, required redirect to move along. Pt became tearful during session stating she wishes she could stand, pt was reminded she just walked with PT, pt was calm after remembering. Patient Currently in Pain: Denies  Pain Level: 0  Overall Orientation Status: Impaired  Orientation Level: Oriented to place; Disoriented to person;Disoriented to place; Disoriented to situation  Patient Observation  Observations: Pt requires constant redirection to stay on task   Pain Assessment  Pain Assessment: 0-10  Pain Level: 0    Objective  Cognition  Overall Cognitive Status: Impaired  Attention Span: Attends with cues to redirect  Memory: Decreased recall of biographical information;Decreased recall of precautions;Decreased recall of recent events;Decreased long term memory;Decreased short term memory  Following Commands:  Follows one step commands with increased time  Safety Judgement: Decreased awareness of need for safety  Awareness of Errors: Decreased awareness of errors  Insights: Decreased awareness of deficits  Sequencing and Organization: Assistance required to identify errors made;Assistance required to generate solutions;Assistance required to implement solutions     Balance  Standing Balance: Contact guard assistance  Standing Balance  Time: ~1min  Activity: standing from recliner to maria de jesus/doff pants  Comment: with RW and mod vc for safety      ADL  Feeding: Contact guard assistance  Grooming: Stand by assistance  UE Bathing: Moderate assistance  LE Bathing: Maximum assistance  UE Dressing: Minimal assistance  LE Dressing: Minimal assistance  Toileting: Dependent/Total  Additional Comments: ADL scores based on clinical reasoning and skilled observation unless otherwise noted. Pt was able to maria de jesus/doff pants only requiring vc for safety due to trying to stand to pull up pants while standing on the legs of the pants. pt doff/maria de jesus gown requiring assistance with ties and buttons on sleeves. Assessment  Performance deficits / Impairments: Decreased ADL status; Decreased functional mobility ; Decreased ROM; Decreased strength;Decreased safe awareness;Decreased cognition;Decreased endurance;Decreased balance;Decreased high-level IADLs;Decreased fine motor control;Decreased coordination  Prognosis: Good  Discharge Recommendations: Patient would benefit from continued therapy after discharge  Activity Tolerance: Treatment limited secondary to decreased cognition  Safety Devices in place: Yes  Type of devices: All fall risk precautions in place; Bed alarm in place;Call light within reach;Gait belt;Patient at risk for falls; Left in bed;Nurse notified               Plan  Safety Devices  Safety Devices in place: Yes  Type of devices:  All fall risk precautions in place, Bed alarm in place, Call light within reach, Gait belt, Patient at risk for falls, Left in bed, Nurse notified  Plan  Times per week: 5-7  Current Treatment Recommendations: Self-Care / ADL, Strengthening, ROM, Balance Training, Functional Mobility Training, Endurance Training, Neuromuscular Re-education, Cognitive Reorientation, Pain Management, Safety Education & Training, Patient/Caregiver Education & Training, Equipment Evaluation, Education, & procurement, Home Management Training, Cognitive/Perceptual Training      Goals  Short term goals  Time Frame for Short term goals: By discharge  Short term goal 1: Pt will complete lower body dressing/bathing with mod A and good safety with use of AE as needed  Short term goal 2: Pt will complete upper body dressing/bathing with min A and good attention to task  Short term goal 3: Pt will complete functional transfers/mobility during self care tasks with min A and good safety with use of least restrictive device  Short term goal 4: Pt will tolerate standing 3+ minutes during functional activity of choice with min A and good safety  Short term goal 5: Pt will participate in 15+ minutes of therapeutic exercises/functional activities to increase safety and independence with self care and mobility    OT Individual Minutes  Time In: 1058  Time Out: Stas Roque  Minutes: 29      Electronically signed by DAYANA Mcintyre on 9/15/21 at 2:27 PM EDT

## 2021-09-15 NOTE — PROGRESS NOTES
Speech Language Pathology  Speech Language Pathology  Critical access hospital [facility]  Cognitive/Speech/Language  Treatment Note    Date: 9/15/2021  Patients Name: Ag Maria  MRN: [id number]  Diagnosis:   Patient Active Problem List   Diagnosis Code    Coronary artery disease I25.10    Chronic diastolic heart failure (HCC) I50.32    Diabetic polyneuropathy associated with type 2 diabetes mellitus (HCC) E11.42    History of coronary artery bypass graft Z95.1    Iron deficiency anemia D50.9    Spinal stenosis of lumbar region with neurogenic claudication M48.062    Mixed hyperlipidemia E78.2    Stage 4 chronic kidney disease ([facility] [address] N18.4    Type 2 diabetes mellitus with kidney complication, with long-term current use of insulin ([facility]) E11.29, Z79.4    Syncope and collapse R55    Obesity, Class II, BMI 35-39.9 E66.9    Thyroid nodule greater than or equal to 1 cm in diameter incidentally noted on imaging study E04.1    Essential hypertension I10    Anemia in stage 4 chronic kidney disease (HCC) N18.4, D63.1    Chronic ischemic heart disease I25.9    Acute cerebrovascular accident (CVA) ([facility] [address] I63.9    Stroke-like symptoms R29.90    Morbid obesity with BMI of 40.0-44.9, adult ([facility]) E66.01, Z68.41    Acute encephalopathy G93.40       Pain: 0/10    Cognitive Treatment    Treatment time:  1648-0451      Subjective: [x] Alert [x] Cooperative     [] Confused     [] Agitated    [] Lethargic      Objective/Assessment:  Attention: functional throughout    Orientation: 100%, pt. Reporting she recalls what people have told her this am/yesterday. Recall: Pt. Demonstrating long term memory issues, is able to use association to recall information/word finding. Pt. Asking who was the president before [address]. When ST told her it was Rg Talamantes, pt. Stating \"what???? You are kidding me, he was never a polititian!!! I have to text my sister!!!\"  Pt.  Unable to recall  (current or past) or what vice president role is. Pt. Recalling multiple topics discussed yesterday, but tearful re: her deficits, \"I still can't remember my address or my sisters. \"    Other: Pt. Demonstrating mild to moderate word finding deficit in conversation, Minerva circumlocuting. Convergent categorization- 63%, 75% c cues. Add item to category- 100%. Pt. Demonstrating long response latency for word finding. Plan:  [x] Continue ST services    [] Discharge from ST:      Discharge recommendations: [] Inpatient Rehab   [] [place]   [] Outpatient Therapy  [] Follow up at trauma clinic   [] Other:       Treatment completed by: Bill Quinn A.CCC/SLP

## 2021-09-16 ENCOUNTER — APPOINTMENT (OUTPATIENT)
Dept: GENERAL RADIOLOGY | Age: 61
DRG: 312 | End: 2021-09-16
Payer: COMMERCIAL

## 2021-09-16 LAB
ALBUMIN SERPL-MCNC: 3.2 G/DL (ref 3.5–5.2)
ALBUMIN/GLOBULIN RATIO: ABNORMAL (ref 1–2.5)
ALP BLD-CCNC: 93 U/L (ref 35–104)
ALT SERPL-CCNC: 9 U/L (ref 5–33)
ANION GAP SERPL CALCULATED.3IONS-SCNC: 8 MMOL/L (ref 9–17)
AST SERPL-CCNC: 14 U/L
BILIRUB SERPL-MCNC: 0.41 MG/DL (ref 0.3–1.2)
BUN BLDV-MCNC: 21 MG/DL (ref 8–23)
BUN/CREAT BLD: ABNORMAL (ref 9–20)
CALCIUM SERPL-MCNC: 8.7 MG/DL (ref 8.6–10.4)
CHLORIDE BLD-SCNC: 103 MMOL/L (ref 98–107)
CO2: 27 MMOL/L (ref 20–31)
CREAT SERPL-MCNC: 2.17 MG/DL (ref 0.5–0.9)
GFR AFRICAN AMERICAN: 28 ML/MIN
GFR NON-AFRICAN AMERICAN: 23 ML/MIN
GFR SERPL CREATININE-BSD FRML MDRD: ABNORMAL ML/MIN/{1.73_M2}
GFR SERPL CREATININE-BSD FRML MDRD: ABNORMAL ML/MIN/{1.73_M2}
GLUCOSE BLD-MCNC: 158 MG/DL (ref 65–105)
GLUCOSE BLD-MCNC: 173 MG/DL (ref 70–99)
GLUCOSE BLD-MCNC: 204 MG/DL (ref 65–105)
GLUCOSE BLD-MCNC: 289 MG/DL (ref 65–105)
GLUCOSE BLD-MCNC: 311 MG/DL (ref 65–105)
HCT VFR BLD CALC: 31.5 % (ref 36–46)
HEMOGLOBIN: 10.5 G/DL (ref 12–16)
MCH RBC QN AUTO: 32.1 PG (ref 26–34)
MCHC RBC AUTO-ENTMCNC: 33.5 G/DL (ref 31–37)
MCV RBC AUTO: 95.7 FL (ref 80–100)
NRBC AUTOMATED: ABNORMAL PER 100 WBC
PDW BLD-RTO: 15 % (ref 11.5–14.9)
PLATELET # BLD: 133 K/UL (ref 150–450)
PMV BLD AUTO: 8.5 FL (ref 6–12)
POTASSIUM SERPL-SCNC: 3.7 MMOL/L (ref 3.7–5.3)
RBC # BLD: 3.29 M/UL (ref 4–5.2)
SODIUM BLD-SCNC: 138 MMOL/L (ref 135–144)
TOTAL PROTEIN: 5.9 G/DL (ref 6.4–8.3)
WBC # BLD: 4.5 K/UL (ref 3.5–11)

## 2021-09-16 PROCEDURE — 99232 SBSQ HOSP IP/OBS MODERATE 35: CPT | Performed by: FAMILY MEDICINE

## 2021-09-16 PROCEDURE — 6370000000 HC RX 637 (ALT 250 FOR IP): Performed by: FAMILY MEDICINE

## 2021-09-16 PROCEDURE — 97129 THER IVNTJ 1ST 15 MIN: CPT

## 2021-09-16 PROCEDURE — 2060000000 HC ICU INTERMEDIATE R&B

## 2021-09-16 PROCEDURE — 97535 SELF CARE MNGMENT TRAINING: CPT

## 2021-09-16 PROCEDURE — 71046 X-RAY EXAM CHEST 2 VIEWS: CPT

## 2021-09-16 PROCEDURE — 97530 THERAPEUTIC ACTIVITIES: CPT

## 2021-09-16 PROCEDURE — 36415 COLL VENOUS BLD VENIPUNCTURE: CPT

## 2021-09-16 PROCEDURE — 97116 GAIT TRAINING THERAPY: CPT

## 2021-09-16 PROCEDURE — 82947 ASSAY GLUCOSE BLOOD QUANT: CPT

## 2021-09-16 PROCEDURE — 80053 COMPREHEN METABOLIC PANEL: CPT

## 2021-09-16 PROCEDURE — 99254 IP/OBS CNSLTJ NEW/EST MOD 60: CPT | Performed by: STUDENT IN AN ORGANIZED HEALTH CARE EDUCATION/TRAINING PROGRAM

## 2021-09-16 PROCEDURE — 97130 THER IVNTJ EA ADDL 15 MIN: CPT

## 2021-09-16 PROCEDURE — 2580000003 HC RX 258: Performed by: FAMILY MEDICINE

## 2021-09-16 PROCEDURE — 85027 COMPLETE CBC AUTOMATED: CPT

## 2021-09-16 PROCEDURE — 97110 THERAPEUTIC EXERCISES: CPT

## 2021-09-16 RX ADMIN — BUSPIRONE HYDROCHLORIDE 7.5 MG: 5 TABLET ORAL at 21:18

## 2021-09-16 RX ADMIN — FUROSEMIDE 80 MG: 40 TABLET ORAL at 08:41

## 2021-09-16 RX ADMIN — INSULIN GLARGINE 53 UNITS: 100 INJECTION, SOLUTION SUBCUTANEOUS at 21:18

## 2021-09-16 RX ADMIN — GABAPENTIN 300 MG: 300 CAPSULE ORAL at 21:15

## 2021-09-16 RX ADMIN — PANTOPRAZOLE SODIUM 40 MG: 40 TABLET, DELAYED RELEASE ORAL at 06:24

## 2021-09-16 RX ADMIN — INSULIN LISPRO 6 UNITS: 100 INJECTION, SOLUTION INTRAVENOUS; SUBCUTANEOUS at 17:32

## 2021-09-16 RX ADMIN — GABAPENTIN 300 MG: 300 CAPSULE ORAL at 08:41

## 2021-09-16 RX ADMIN — ASPIRIN 81 MG: 81 TABLET, CHEWABLE ORAL at 08:41

## 2021-09-16 RX ADMIN — RANOLAZINE 1000 MG: 500 TABLET, FILM COATED, EXTENDED RELEASE ORAL at 08:40

## 2021-09-16 RX ADMIN — SENNOSIDES 17.2 MG: 8.6 TABLET, COATED ORAL at 08:40

## 2021-09-16 RX ADMIN — APIXABAN 5 MG: 5 TABLET, FILM COATED ORAL at 08:41

## 2021-09-16 RX ADMIN — INSULIN LISPRO 8 UNITS: 100 INJECTION, SOLUTION INTRAVENOUS; SUBCUTANEOUS at 12:18

## 2021-09-16 RX ADMIN — INSULIN LISPRO 2 UNITS: 100 INJECTION, SOLUTION INTRAVENOUS; SUBCUTANEOUS at 08:08

## 2021-09-16 RX ADMIN — APIXABAN 5 MG: 5 TABLET, FILM COATED ORAL at 21:15

## 2021-09-16 RX ADMIN — FEBUXOSTAT 40 MG: 40 TABLET, FILM COATED ORAL at 08:41

## 2021-09-16 RX ADMIN — TAMSULOSIN HYDROCHLORIDE 0.4 MG: 0.4 CAPSULE ORAL at 08:41

## 2021-09-16 RX ADMIN — FUROSEMIDE 80 MG: 40 TABLET ORAL at 17:41

## 2021-09-16 RX ADMIN — RANOLAZINE 1000 MG: 500 TABLET, FILM COATED, EXTENDED RELEASE ORAL at 21:14

## 2021-09-16 RX ADMIN — ATORVASTATIN CALCIUM 80 MG: 80 TABLET, FILM COATED ORAL at 08:41

## 2021-09-16 RX ADMIN — SODIUM CHLORIDE, PRESERVATIVE FREE 10 ML: 5 INJECTION INTRAVENOUS at 21:14

## 2021-09-16 RX ADMIN — BUSPIRONE HYDROCHLORIDE 7.5 MG: 5 TABLET ORAL at 08:41

## 2021-09-16 RX ADMIN — INSULIN LISPRO 2 UNITS: 100 INJECTION, SOLUTION INTRAVENOUS; SUBCUTANEOUS at 21:18

## 2021-09-16 RX ADMIN — INSULIN GLARGINE 53 UNITS: 100 INJECTION, SOLUTION SUBCUTANEOUS at 08:09

## 2021-09-16 RX ADMIN — TRAZODONE HYDROCHLORIDE 75 MG: 50 TABLET ORAL at 21:14

## 2021-09-16 ASSESSMENT — ENCOUNTER SYMPTOMS
DOUBLE VISION: 0
ABDOMINAL PAIN: 0
BLURRED VISION: 0
SHORTNESS OF BREATH: 1

## 2021-09-16 NOTE — PLAN OF CARE
Problem: Falls - Risk of:  Goal: Will remain free from falls  Description: Will remain free from falls  9/16/2021 1804 by Yesenia Coles RN  Outcome: Ongoing     Problem: Falls - Risk of:  Goal: Absence of physical injury  Description: Absence of physical injury  9/16/2021 1804 by Yesenia Coles RN  Outcome: Ongoing     Problem: Skin Integrity:  Goal: Will show no infection signs and symptoms  Description: Will show no infection signs and symptoms  9/16/2021 1804 by Yesenia Coles RN  Outcome: Ongoing     Problem: Skin Integrity:  Goal: Absence of new skin breakdown  Description: Absence of new skin breakdown  9/16/2021 1804 by Yesenia Coles RN  Outcome: Ongoing     Problem: Skin Integrity:  Goal: Complications related to intravenous access or infusion will be avoided or minimized  Description: Complications related to intravenous access or infusion will be avoided or minimized  9/16/2021 1804 by Yesenia Coles RN  Outcome: Ongoing

## 2021-09-16 NOTE — PROGRESS NOTES
[address] [facility]    OCCUPATIONAL THERAPY MISSED TREATMENT NOTE   INPATIENT   Date: 21  Patient Name: Maria Elena Basurto       Room: 4721/0117-92  MRN: [id number]   Account #: [de-identified]    : 1960  (59 y.o.)  Gender: female   Referring Practitioner: Radha Bowie MD  Diagnosis: Acute encephalopathy             REASON FOR MISSED TREATMENT:  Patient with another ancillary department   -   940 Patient with speech therapy at this time. Occupational therapy will attempt again if time allows.           Chen Soliz, OT

## 2021-09-16 NOTE — PROGRESS NOTES
Patients O2 sats dropped to 85% on RA while sleeping. Patient placed on 2L nasal cannula. Oxygen sats now at 94%. Will continue to monitor.

## 2021-09-16 NOTE — PROGRESS NOTES
[facility]: [facility]   INPATIENT OCCUPATIONAL THERAPY  PROGRESS NOTE  Date: 2021  Patient Name: Darlin Preston      Room: [facility]0/1512-96  MRN: [id number]    : 1960  (62 y.o.) Gender: female     Discharge Recommendations:  Further Occupational Therapy is recommended upon facility discharge. Equipment Needed:  (TBD)    Referring Practitioner: Ivana Barrera MD  Diagnosis: Acute encephalopathy  General  Chart Reviewed: Yes  Patient assessed for rehabilitation services?: Yes  Family / Caregiver Present: Yes  Referring Practitioner: Ivana Barrera MD  Diagnosis: Acute encephalopathy    Restrictions  Restrictions/Precautions: Fall Risk  Other position/activity restrictions: up w/ assist  Required Braces or Orthoses  Right Lower Extremity Brace:  (Lymphedema wraps)  Left Lower Extremity Brace:  (Lymphedema wraps)  Required Braces or Orthoses?: Yes      Subjective  Subjective: Pt sitting in recliner chair with sister in room upon arrival. Pt was pleasant and agreeable to OT treatment  Comments: ok per RN Yesenia for OT treatment  Patient Currently in Pain: Denies  Overall Orientation Status: Within Functional Limits          Objective  Cognition  Overall Cognitive Status: Impaired  Attention Span: Attends with cues to redirect  Memory: Decreased recall of biographical information;Decreased recall of precautions;Decreased recall of recent events;Decreased long term memory;Decreased short term memory  Following Commands:  Follows one step commands with increased time  Safety Judgement: Decreased awareness of need for safety  Awareness of Errors: Decreased awareness of errors  Insights: Decreased awareness of deficits  Sequencing and Organization: Assistance required to identify errors made;Assistance required to generate solutions;Assistance required to implement solutions  Bed mobility  Comment: Pt sitting in recliner chair at the start and end of session  Balance  Sitting Balance: Stand by assistance  Standing Balance: Contact guard assistance  Standing Balance  Time: 1-2 minutes x 2  Activity: functional mobility, toileting  Comment: with RW with verbal cues for safety and hand placement  Functional Mobility  Functional - Mobility Device: Rolling Walker  Activity: To/from bathroom  Assist Level: Contact guard assistance  Functional Mobility Comments: Verbal cues for hand placement and safety   ADL  Feeding: Contact guard assistance  Grooming: Stand by assistance  UE Bathing: Minimal assistance  LE Bathing: Minimal assistance  UE Dressing: Minimal assistance  LE Dressing: Minimal assistance  Toileting: Contact guard assistance  Additional Comments: ADL scores based on clinical reaonsing and skilled observation unless otherwise noted. Pt educated on use of sock aid to assist with donning socks at this time. Pt required min A to complete with with verbal cues throughout. Pt completed toileting task with CGA for safety and increased time to complete. Pt educated on use of pad and application of pad to underwear due to cogntive barriers. Pt currently limited due to decreased motor planning, strength, ROM, balance, activity tolerance, coordination, and cogntive barriers impacting safety and independence with self care tasks     Transfers  Sit to stand: Contact guard assistance  Stand to sit: Contact guard assistance  Transfer Comments: Verbal cues for hand placement and safety  Toilet Transfers  Toilet - Technique: Ambulating  Equipment Used: Grab bars  Toilet Transfer: Contact guard assistance  Toilet Transfers Comments: Verbal cues for hand placement and safety  Gross Motor: OT faciliated pts enagement in BUE coordination. Pt continues to demonstrate decreased motor planning with RUE at this time. Pt required tactile cues to participate in RUE ROM when prompted. Pt demonstrated habitual use of BUE during functional tasks.  Pt educated on providing self tactile cues to RUE duing desired movements to assit with motor planning and initiation of task. Fine Motor: OT facilitated patients engagement in finger taps to [facility] and coordination. Pt able to complete task with increased time and concentration to task. Pt educated on completing task in room to [facility]. Pt verbalized understanding. Assessment  Performance deficits / Impairments: Decreased ADL status; Decreased functional mobility ; Decreased ROM; Decreased strength;Decreased safe awareness;Decreased cognition;Decreased endurance;Decreased balance;Decreased high-level IADLs;Decreased fine motor control;Decreased coordination  Prognosis: Good  Discharge Recommendations: Patient would benefit from continued therapy after discharge  Activity Tolerance: Treatment limited secondary to decreased cognition;Patient Tolerated treatment well  Safety Devices in place: Yes  Type of devices: All fall risk precautions in place;Call light within reach;Gait belt;Patient at risk for falls; Left in chair;Nurse notified (Sister in room)             Patient Education: safety with transfers, BUE coordination tasks, use of sock aid to increase independence with lower body dressing, activities to assist with motor planning     Learner:patient  Method: demonstration and explanation       Outcome: demonstrated understanding     Plan  Safety Devices  Safety Devices in place: Yes  Type of devices:  All fall risk precautions in place, Call light within reach, Gait belt, Patient at risk for falls, Left in chair, Nurse notified (Sister in room)  Plan  Times per week: 5-7  Current Treatment Recommendations: Self-Care / ADL, Strengthening, ROM, Balance Training, Functional Mobility Training, Endurance Training, Neuromuscular Re-education, Cognitive Reorientation, Pain Management, Safety Education & Training, Patient/Caregiver Education & Training, Equipment Evaluation, Education, & procurement, Home Management Training, Cognitive/Perceptual

## 2021-09-16 NOTE — PLAN OF CARE
Problem: Falls - Risk of:  Goal: Will remain free from falls  Description: Will remain free from falls  9/16/2021 0439 by Joel Zepeda RN  Outcome: Ongoing  Note: Patient remained free from falls all throughout shift. Bed locked, side rails up X2, belongings and call light within reach.     9/15/2021 1716 by Jacob Wilson RN  Outcome: Ongoing  Goal: Absence of physical injury  Description: Absence of physical injury  9/16/2021 0439 by Joel Zepeda RN  Outcome: Ongoing  9/15/2021 1716 by Jacob Wilson RN  Outcome: Ongoing     Problem: Skin Integrity:  Goal: Will show no infection signs and symptoms  Description: Will show no infection signs and symptoms  9/16/2021 0439 by Joel Zepeda RN  Outcome: Ongoing  9/15/2021 1716 by Jacob Wilson RN  Outcome: Ongoing  Goal: Absence of new skin breakdown  Description: Absence of new skin breakdown  9/16/2021 0439 by Joel Zepeda RN  Outcome: Ongoing  9/15/2021 1716 by Jacob Wilson RN  Outcome: Ongoing  Goal: Complications related to intravenous access or infusion will be avoided or minimized  Description: Complications related to intravenous access or infusion will be avoided or minimized  9/16/2021 0439 by Joel Zepeda RN  Outcome: Ongoing  9/15/2021 1716 by Jacob Wilsno RN  Outcome: Ongoing     Problem: ACTIVITY INTOLERANCE/IMPAIRED MOBILITY  Goal: Mobility/activity is maintained at optimum level for patient  9/16/2021 0439 by Joel Zepeda RN  Outcome: Ongoing  9/15/2021 1716 by Jacob Wilson RN  Outcome: Ongoing     Problem: COMMUNICATION IMPAIRMENT  Goal: Ability to express needs and understand communication  9/16/2021 0439 by Joel Zepeda RN  Outcome: Ongoing  9/15/2021 1716 by Jacob Wilson RN  Outcome: Ongoing     Problem: Physical Regulation:  Goal: Will remain free from infection  Description: Will remain free from infection  9/16/2021 0439 by Joel Zepeda RN  Outcome: Ongoing  9/15/2021 1716 by Jacob Wilson RN  Outcome: Ongoing Problem: Neurological  Goal: Maximum potential motor/sensory/cognitive function  9/16/2021 0439 by Jon Calderon RN  Outcome: Ongoing  9/15/2021 1716 by Sachin Caldera RN  Outcome: Ongoing

## 2021-09-16 NOTE — FLOWSHEET NOTE
09/16/21 1206   Encounter Summary   Services provided to: Patient   Referral/Consult From: [facility]   (9/16/21V)   Volunteer Visit Yes   Complexity of Encounter Low   Length of Encounter 15 minutes   Spiritual/Episcopalian   Type Spiritual support   Intervention Communion   Sacraments   Communion Patient received communion

## 2021-09-16 NOTE — CONSULTS
Physical Medicine & Rehabilitation  Consult Note      Admitting Physician:  Yolanda Brunner MD    Primary Care Provider:  AFSANEH Aragon CNP     Reason for Consult:  Acute Inpatient Rehabilitation    Chief Complaint:  Altered mental status    History of Present Illness:  Referring Provider is requesting an evaluation for appropriate placement upon discharge from acute care. History from chart review and patient. Cesia Carrera is a 59 y.o. right-handed female with history of recent CVA, AURELIA on CKD (on hemodialysis), CAD, CHF, atrial fibrillation, HTN, type 2 diabetes admitted to [facility] on 9/13/2021. She initially presented following a syncopal episode in dialysis, after which she was reportedly confused. She has reported right lower limb weakness and heaviness. Imaging of the brain has been unremarkable. EEG showed mild encephalopathy with no epileptiform activity. Symptoms are thought to be secondary to hypoperfusion. Pulmonology has been consulted for new oxygen requirement overnight. She is known to our service from acute rehab admission following recent CVA. She currently reports ongoing impaired memory and right-sided numbness/tingling and weakness. She thinks that symptoms are improving a little bit in the right upper limb. She also reports intermittent headache and dizziness. She notes shortness of breath with activity and decreased urine output as well. Review of Systems:  Review of Systems   Constitutional: Negative for fever. HENT: Negative for hearing loss. Eyes: Negative for blurred vision and double vision. Respiratory: Positive for shortness of breath. Cardiovascular: Negative for chest pain. Gastrointestinal: Negative for abdominal pain. Genitourinary:        +decreased urine output   Skin: Negative for rash. Neurological: Positive for dizziness, sensory change, weakness and headaches. Psychiatric/Behavioral: Positive for memory loss. Premorbid function:  Needing assistance with homemaking    Current function:    PT:  Restrictions/Precautions: Fall Risk  Other position/activity restrictions: up w/ assist  Required Braces or Orthoses  Right Lower Extremity Brace:  (Lymphedema wraps)  Left Lower Extremity Brace:  (Lymphedema wraps)   Transfers  Sit to Stand: Supervision  Stand to sit: Supervision  Bed to Chair: Supervision  Stand Pivot Transfers: Supervision  Comment: pt stood next to the bed for less than 1 minute- pt's right knee buckling w/ standing- Pt fearful of falling and C/O weakness and heaviness of right LE  Ambulation 1  Surface: level tile  Device: Rolling Walker  Assistance: Stand by assistance  Quality of Gait: slight foward flexed posture with short step lengthb   Gait Deviations: Slow Annie, Increased ELISABET, Decreased step length, Decreased step height  Distance: 10ft from bed to toilet; 20ft from toilet to bedside chair  Comments: patient less fearful this PM. Able to amb. distanvce with no buckeling of R knee     Transfers  Sit to Stand: Supervision  Stand to sit: Supervision  Bed to Chair: Supervision  Stand Pivot Transfers: Supervision  Comment: pt stood next to the bed for less than 1 minute- pt's right knee buckling w/ standing- Pt fearful of falling and C/O weakness and heaviness of right LE  Ambulation  Ambulation?: Yes  Ambulation 1  Surface: level tile  Device: Rolling Walker  Assistance: Stand by assistance  Quality of Gait: slight foward flexed posture with short step lengthb   Gait Deviations: Slow Annie, Increased ELISABET, Decreased step length, Decreased step height  Distance: 10ft from bed to toilet; 20ft from toilet to bedside chair  Comments: patient less fearful this PM. Able to amb. distanvce with no buckeling of R knee     Surface: level tile  Ambulation 1  Surface: level tile  Device: Rolling Walker  Assistance: Stand by assistance  Quality of Gait: slight foward flexed posture with short step lengthb   Gait Deviations: Slow Annie, Increased ELISABET, Decreased step length, Decreased step height  Distance: 10ft from bed to toilet; 20ft from toilet to bedside chair  Comments: patient less fearful this PM. Able to amb. distanvce with no buckeling of R knee       OT:   ADL  Feeding: Contact guard assistance  Grooming: Stand by assistance  UE Bathing: Moderate assistance  LE Bathing: Maximum assistance  UE Dressing: Minimal assistance  LE Dressing: Minimal assistance  Toileting: Dependent/Total  Additional Comments: ADL scores based on clinical reasoning and skilled observation unless otherwise noted. Pt was able to maria de jesus/doff pants only requiring vc for safety due to trying to stand to pull up pants while standing on the legs of the pants. pt doff/maria de jesus gown requiring assistance with ties and buttons on sleeves. Balance  Sitting Balance: Contact guard assistance (CGA-SBA)  Standing Balance: Contact guard assistance   Standing Balance  Time: ~1min  Activity: standing from recliner to maria de jesus/doff pants  Comment: with RW and mod vc for safety  Functional Mobility  Functional Mobility Comments: Functional mobility deferred due to weakness and fear of falling     Bed mobility  Rolling to Left: Supervision  Rolling to Right: Supervision  Supine to Sit: Supervision  Sit to Supine: Unable to assess  Scooting: Supervision  Comment: Pt required increased time to complete bed mobility. Pt reports that right side was feeling heavy. Pt sat EOB with CGA-SBA due to increased dizziness/feeling foggy. Transfers  Sit to stand: 2 Person assistance, Moderate assistance  Stand to sit: 2 Person assistance, Moderate assistance  Transfer Comments: Verbal cues for hand placement and safety. Pt demonstrated right knee buckling. Pt demonstrated increased anxiety/fear of falling while standing. SLP:  Subjective: [x]? Alert     [x]? Cooperative     []? Confused     []? Agitated    []? HAND SURGERY      IR TUNNELED CATHETER PLACEMENT GREATER THAN 5 YEARS  8/18/2021    IR TUNNELED CATHETER PLACEMENT GREATER THAN 5 YEARS 8/18/2021 [facility] SPECIAL PROCEDURES    KNEE ARTHROSCOPY      right    TONSILLECTOMY         Allergies:     Allergies   Allergen Reactions    Adhesive Tape Other (See Comments) and Rash     Other reaction(s): Unknown    Ace Inhibitors Other (See Comments)     cough    Iv Dye [Iodides]      Stage 4 kidney disease    Metformin And Related Hives, Itching and Rash        Current Medications:   Current Facility-Administered Medications: sodium citrate 4 % injection 1.9 mL, 1.9 mL, IntraCATHeter, PRN  sodium citrate 4 % injection 1.9 mL, 1.9 mL, IntraCATHeter, PRN  potassium chloride (KLOR-CON M) extended release tablet 40 mEq, 40 mEq, Oral, PRN **OR** potassium bicarb-citric acid (EFFER-K) effervescent tablet 40 mEq, 40 mEq, Oral, PRN **OR** potassium chloride 10 mEq/100 mL IVPB (Peripheral Line), 10 mEq, IntraVENous, PRN  0.9 % sodium chloride infusion, , IntraVENous, Continuous  sodium chloride flush 0.9 % injection 5-40 mL, 5-40 mL, IntraVENous, 2 times per day  sodium chloride flush 0.9 % injection 5-40 mL, 5-40 mL, IntraVENous, PRN  0.9 % sodium chloride infusion, 25 mL, IntraVENous, PRN  acetaminophen (TYLENOL) tablet 650 mg, 650 mg, Oral, Q4H PRN  ondansetron (ZOFRAN-ODT) disintegrating tablet 4 mg, 4 mg, Oral, Q8H PRN **OR** ondansetron (ZOFRAN) injection 4 mg, 4 mg, IntraVENous, Q6H PRN  acetaminophen (TYLENOL) tablet 650 mg, 650 mg, Oral, Q4H PRN  aspirin chewable tablet 81 mg, 81 mg, Oral, Daily  ranolazine (RANEXA) extended release tablet 1,000 mg, 1,000 mg, Oral, BID  busPIRone (BUSPAR) tablet 7.5 mg, 7.5 mg, Oral, TID  apixaban (ELIQUIS) tablet 5 mg, 5 mg, Oral, BID  gabapentin (NEURONTIN) capsule 300 mg, 300 mg, Oral, BID  insulin glargine (LANTUS) injection vial 53 Units, 53 Units, SubCUTAneous, BID  atorvastatin (LIPITOR) tablet 80 mg, 80 mg, Oral, Daily  carvedilol (COREG) tablet 6.25 mg, 6.25 mg, Oral, BID  furosemide (LASIX) tablet 80 mg, 80 mg, Oral, BID  febuxostat (ULORIC) tablet 40 mg, 40 mg, Oral, Daily  ferrous sulfate (IRON 325) tablet 325 mg, 325 mg, Oral, BID WC  polyethylene glycol (GLYCOLAX) packet 17 g, 17 g, Oral, Daily PRN  senna (SENOKOT) tablet 17.2 mg, 2 tablet, Oral, Daily PRN  tamsulosin (FLOMAX) capsule 0.4 mg, 0.4 mg, Oral, Daily  pantoprazole (PROTONIX) tablet 40 mg, 40 mg, Oral, QAM AC  traZODone (DESYREL) tablet 75 mg, 75 mg, Oral, Nightly  insulin lispro (HUMALOG) injection vial 0-12 Units, 0-12 Units, SubCUTAneous, TID WC  insulin lispro (HUMALOG) injection vial 0-6 Units, 0-6 Units, SubCUTAneous, Nightly  glucose (GLUTOSE) 40 % oral gel 15 g, 15 g, Oral, PRN  dextrose 50 % IV solution, 12.5 g, IntraVENous, PRN  glucagon (rDNA) injection 1 mg, 1 mg, IntraMUSCular, PRN  dextrose 5 % solution, 100 mL/hr, IntraVENous, PRN    Family History:       Problem Relation Age of Onset    Diabetes Father     Heart Failure Father        Social History:  Lives With: Other (comment) (Lives with mother)  Type of Home:  (Condo)  Home Layout: One level  Home Access: Ramped entrance  Bathroom Shower/Tub: Walk-in shower, Doors (Threshold to step over)  Bathroom Toilet: Handicap height  Bathroom Equipment: Grab bars in shower, Built-in shower seat (Pt. reports using wall of shower to get off toilet)  Home Equipment: 4 wheeled walker, Cane, Sock aid  ADL Assistance: Independent  Homemaking Assistance: Needs assistance (Pt. uses motorized cart at the grocery store, sister does grocery shopping typically. 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 sleeps in flat bed at home,  Additional Comments: Pt recently discharged from Rehab at [facility] on 8- w/ home health was set up for PT, OT and Speech. Pt was going to dialysis 3 X/ week. .  Pt is a confused- above information was taken from PT evaluation dated 8-. Pt.'s mother becoming less mobile, pt.'s sister helps out mother. Pt. reports that 2 sisters can assist. Pt.s father in [facility] home. Pt reports that her 2 sisters live close by and that her one sister is retired and able to assist as needed. Social History     Socioeconomic History    Marital status: Single     Spouse name: None    Number of children: None    Years of education: None    Highest education level: None   Occupational History    None   Tobacco Use    Smoking status: Never Smoker    Smokeless tobacco: Never Used   Substance and Sexual Activity    Alcohol use: No    Drug use: No    Sexual activity: Not Currently   Other Topics Concern    None   Social History Narrative    None     Social Determinants of Health     Financial Resource Strain: Low Risk     Difficulty of Paying Living Expenses: Not hard at all   Food Insecurity:     Worried About [address] in the Last Year:     [address] in the Last Year:    Transportation Needs:     Lack of Transportation (Medical):      Lack of Transportation (Non-Medical):    Physical Activity:     Days of Exercise per Week:     Minutes of Exercise per Session:    Stress:     Feeling of Stress :    Social Connections:     Frequency of Communication with Friends and Family:     Frequency of Social Gatherings with Friends and Family:     Attends Sikh Services:     Active Member of Clubs or Organizations:     Attends Club or Organization Meetings:     Marital Status:    Intimate Partner Violence:     Fear of Current or Ex-Partner:     Emotionally Abused:     Physically Abused:     Sexually Abused:        Physical Exam:  /63   Pulse 52   Temp 97.5 °F (36.4 °C) (Oral)   Resp 16   Ht 5' 5\" (1.651 m)   Wt 241 lb 10 oz (109.6 kg)   SpO2 95%   BMI 40.21 kg/m²     GEN: Well developed, well nourished, no acute distress  HEENT: NCAT. EOMI. Hearing grossly intact. Mucous membranes pink and moist.  RESP: Normal breath sounds with no wheezing, rales, or rhonchi. Respirations WNL and unlabored. CV: Regular rate and rhythm. No murmurs, rubs, or gallops. ABD: Soft, non-distended, BS+ and equal.  NEURO: Alert. Patient able to recall examiner from previous admission and states that examiner was mentioned by therapists this morning. Speech fluent with some expressive aphasia/anomia. No facial droop. Symmetrical shoulder shrug. Midline tongue protrusion. Sensation to light touch decreased in right upper and lower limbs compared to left. Hypersensitivity noted by patient with sensation testing in the distal right lower limb. MSK:  Muscle bulk is normal bilaterally. Strength 4/5 in right upper limb and 5/5 in left upper limb. Strength 4+/5 with bilateral ankle dorsiflexion and plantarflexion. Has difficulty lifting the right lower limb off of the chair but able to lift the left against gravity. LIMBS:  Mild edema present in right upper limb and bilateral lower limbs. SKIN: Warm and dry with good turgor. PSYCH: Mood WNL. Affect WNL. Appropriately interactive.     Diagnostics:    CBC:   Recent Labs     09/14/21  0425 09/15/21  0420 09/16/21  0423   WBC 6.0 5.4 4.5   RBC 3.57* 3.22* 3.29*   HGB 11.3* 10.4* 10.5*   HCT 34.0* 31.6* 31.5*   MCV 95.3 97.9 95.7   RDW 14.8 15.0* 15.0*    139* 133*     BMP:   Recent Labs     09/14/21  0554 09/15/21  0420 09/15/21  1901 09/16/21 0423    142  --  138   K 3.5* 4.3  --  3.7    109*  --  103   CO2 24 21  --  27   PHOS  -- --  2.5*  --    BUN 25* 29*  --  21   CREATININE 2.20* 2.73*  --  2.17*   GLUCOSE 142* 149*  --  173*      HbA1c:   Lab Results   Component Value Date    LABA1C 8.9 (H) 03/15/2021     BNP: No results for input(s): BNP in the last 72 hours. PT/INR:   Recent Labs     09/13/21  1409   PROTIME 15.5*   INR 1.2     APTT: No results for input(s): APTT in the last 72 hours. CARDIAC ENZYMES:   Recent Labs     09/13/21  1409 09/13/21  2206   TROPONINT NOT REPORTED NOT REPORTED     FASTING LIPID PANEL:  Lab Results   Component Value Date    CHOL 105 04/09/2015    HDL 43 04/09/2015    TRIG 168 04/09/2015     LIVER PROFILE:   Recent Labs     09/14/21  0554 09/15/21  0420 09/16/21  0423   AST 13 14 14   ALT 10 10 9   BILITOT 0.41 0.32 0.41   ALKPHOS 91 95 93       Radiology:  MRA NECK WO CONTRAST   Final Result   No hemodynamically significant stenosis identified. Possible mild narrowing   of the proximal most left ICA as detailed above. If further evaluation is   desired, consider CTA or duplex ultrasound. MRI BRAIN WO CONTRAST   Final Result   Normal examinations. MRA HEAD WO CONTRAST   Final Result   Normal examinations. CT HEAD WO CONTRAST   Final Result   No acute intracranial abnormality. Critical results were called by Dr. Little Gimenez to Dr Ludivina Duke on 9/13/2021 at   14:32. Impression:    1. Encephalopathy, thought to be secondary to hypoperfusion  2. Recrudescence of right-sided stroke symptoms  3. Anemia  4. Thrombocytopenia  5. AURELIA on CKD, on hemodialysis  6. Recent CVA  7. CAD  8. CHF  9. Atrial fibrillation  10. HTN  11. Type 2 diabetes  12. Obesity    Recommendations:    1. Diagnosis:  Encephalopathy  2. Therapy: Has PT/OT/SLP needs  3. Medical Necessity: As above  4. Support: Lives with mother, sisters are able to assist  5. Rehab Recommendation:  The patient will benefit from acute inpatient rehabilitation once medically stable per primary service.  Anticipate she will be able to tolerate 3 hours of therapy per day in rehabilitation. The patient requires multidisciplinary rehabilitation treatment including medical management by a PM&R physician, 24 hour rehabilitation nursing, physical therapy, occupational therapy, speech therapy, rehabilitation social work, and nutrition services. Patient and family also require education in post-hospital precautions and home exercise routine, adaptive techniques and deficit compensation strategies, strengthening and conditioning, equipment prescription and instructions in use. 6. DVT Prophylaxis: On eliquis    It was my pleasure to evaluate Terry Galeas today. Please call with questions.     Drucie Sicard, MD

## 2021-09-16 NOTE — PROGRESS NOTES
Speech Language Pathology  Speech Language Pathology  [facility]  Cognitive/Speech/Language  Treatment Note    Date: 9/16/2021  Patients Name: Brianda Traore  MRN: [id number]  Diagnosis: Cognitive impairment   Patient Active Problem List   Diagnosis Code    Coronary artery disease I25.10    Chronic diastolic heart failure (HCC) I50.32    Diabetic polyneuropathy associated with type 2 diabetes mellitus (HCC) E11.42    History of coronary artery bypass graft Z95.1    Iron deficiency anemia D50.9    Spinal stenosis of lumbar region with neurogenic claudication M48.062    Mixed hyperlipidemia E78.2    Stage 4 chronic kidney disease (HCC) N18.4    Type 2 diabetes mellitus with kidney complication, with long-term current use of insulin (HCC) E11.29, Z79.4    Syncope and collapse R55    Obesity, Class II, BMI 35-39.9 E66.9    Thyroid nodule greater than or equal to 1 cm in diameter incidentally noted on imaging study E04.1    Essential hypertension I10    Anemia in stage 4 chronic kidney disease (HCC) N18.4, D63.1    Chronic ischemic heart disease I25.9    Acute cerebrovascular accident (CVA) ([facility] [address] I63.9    Stroke-like symptoms R29.90    Morbid obesity with BMI of 40.0-44.9, adult ([facility]) E66.01, Z68.41    Acute encephalopathy G93.40       Pain: 0/10    Cognitive Treatment    Treatment time:  8205-1452      Subjective: [x] Alert [x] Cooperative     [] Confused     [] Agitated    [] Lethargic      Objective/Assessment:  Attention: functional throughout    Orientation: Pt using whiteboard in room to recall date, year, bethany. Unable to recall president    Recall: Pt recalling Evangelist Bumpers from previous admission upon arrival. No family present in room. Pt reports that she \"can't remember my family\" and expressed difficulty recalling specific details of her life.  Pt actively using \"memory book\" in session to recall previous day's events/important information, etc.  Pt ed provided re use of external memory aides to facilitate recall. Pt verbalized understanding of all recommendations. Working memory task (3 unit word order) pt completed task at 60% accuracy anthony, improving to 80% with max cues/max repetition provided. Other: Word finding:   Generative namin members of concrete category (no time constraint) 67% accuracy, pt encouraged to utilize circumlocution throughout. Pt reports task is \"harder than it should be\". Dietary called midway through session, pt demonstrated anomia in conversation with dietary unable to recall the word \"pretzel\". Pt ed also provided re use circumlocution to assist in word recall. Pt verbalized understanding. Plan:  [x] Continue ST services    [] Discharge from ST:      Discharge recommendations: [] Inpatient Rehab   [] [place]   [] Outpatient Therapy  [] Follow up at trauma clinic   [] Other:       Treatment completed by: Jovi SPRINGER A.CCC/SLP

## 2021-09-16 NOTE — CONSULTS
[facility]MON[facility] SPECIAL[facility]   CONSULT NOTE:      DATE OF CONSULT 9/16/2021    REASON FOR CONSULTATION: SpO2 in the [de-identified], requiring NC O2      PCP AFSANEH Ordonez CNP     CHIEF COMPLAINT: Stroke like symptoms     HISTORY OF PRESENT ILLNESS:     Mrs. Mercy Watts is a pleasant 60 y/o female PMHx of CAD, CHF, DM, HTN, HLD, ESRD, and past left MCA stroke who presented to the ER on 9/13 after losing consciousness at dialysis and displaying right sided numbness and weakness with confusion after waking. Our services were consulted today because she desated to 85% on room air and is now requiring 2L of NC oxygen, most recent SpO2 is  95%. After furthering testing during admission, it was determined that she had transient mild encephalopathy and possible hypotensive episode at dialysis. Her MRI of the brain and head showed no changes with normal anatomy and noncontrast head CT showed no intracranial abnormality. MRI of the neck revealed possible narrowing of the most left ICA. On exam, patient is alert and can tell us that she is at [facility] but says that is only because she was told that she was at the hospital, and not necessarily because she can recall it. She tells us her sisters were in, but that she does not know anything about them. She is unable to recall what happened at dialysis. Her last memory is seeing men at dialysis. She cannot recall what she did for a living before [facility]. She denies chest pain/pressure, SOB, cough, or wheeze. She has never required oxygen before. She has not tried to ambulate with her oxygen on yet. She admits to feeling fatigued. She tells us she has a hard time finding the words and remembering anything long term before this recent incident. Additionally, she complains of her right arm and leg feeling heavy with paraesthesia on the right. She has b/l neuropathy in her feet and is numb. Her right arm feels numb more so than her left.     She says that she believes she is COVID vaccinated, but does not recall any details about the virus to know. She does not believe that she was a smoker or that she has any respiratory conditions, but she cannot recall for sure. ALLERGIES:  Allergies   Allergen Reactions    Adhesive Tape Other (See Comments) and Rash     Other reaction(s): Unknown    Ace Inhibitors Other (See Comments)     cough    Iv Dye [Iodides]      Stage 4 kidney disease    Metformin And Related Hives, Itching and Rash       HOME MEDICATIONS:  Medications Prior to Admission: insulin lispro, 1 Unit Dial, (HUMALOG KWIKPEN) 100 UNIT/ML SOPN, Inject into the skin 3 times daily (before meals) Per sliding scale  traZODone (DESYREL) 50 MG tablet, Take 75 mg by mouth nightly  pantoprazole (PROTONIX) 40 MG tablet, TAKE 1 TABLET BY MOUTH EVERY MORNING BEFORE BREAKFAST  acetaminophen (TYLENOL) 325 MG tablet, Take 2 tablets by mouth every 4 hours as needed for Pain  aspirin 81 MG chewable tablet, Take 1 tablet by mouth daily  ranolazine (RANEXA) 1000 MG extended release tablet, Take 1 tablet by mouth 2 times daily  busPIRone (BUSPAR) 7.5 MG tablet, Take 1 tablet by mouth 3 times daily  gabapentin (NEURONTIN) 300 MG capsule, Take 1 capsule by mouth 2 times daily for 30 days.   insulin glargine (BASAGLAR KWIKPEN) 100 UNIT/ML injection pen, Inject 53 Units into the skin 2 times daily  atorvastatin (LIPITOR) 80 MG tablet, Take 1 tablet by mouth daily  carvedilol (COREG) 6.25 MG tablet, Take 1 tablet by mouth 2 times daily  furosemide (LASIX) 80 MG tablet, Take 1 tablet by mouth 2 times daily  febuxostat (ULORIC) 40 MG TABS tablet, Take 1 tablet by mouth daily  ferrous sulfate (BRIAN-ARCHIE) 325 (65 Fe) MG tablet, Take 1 tablet by mouth 2 times daily (with meals)  polyethylene glycol (GLYCOLAX) 17 g packet, Take 17 g by mouth daily as needed for Constipation  tamsulosin (FLOMAX) 0.4 MG capsule, Take 1 capsule by mouth daily  ELIQUIS 5 MG TABS tablet, Take 1 tablet by mouth 2 times daily  blood glucose test strips (DEN CONTOUR TEST) strip, 1 each by In Vitro route 3 times daily  senna (SENOKOT) 8.6 MG tablet, Take 2 tablets by mouth daily as needed (Constipation)  Insulin Pen Needle (BD ULTRA-FINE PEN NEEDLES) 29G X 12.7MM MISC, Use one needle for each insulin injection. Alcohol Swabs 70 % PADS, Use an alcohol swab to cleanse the skin before checking your blood sugar and before each insulin injection. sharps container, 1 each by Does not apply route as needed (dirty pen needles)  Lancets 28G MISC, Inject 1 each into the skin 3 times daily.       PAST MEDICAL HISTORY:  Past Medical History:   Diagnosis Date    Backache, unspecified     CHF (congestive heart failure) (HCC)     Chronic kidney disease     Coronary atherosclerosis of artery bypass graft     Cramp of limb     Gallstones     Hypertension     Insomnia     Pneumonia     Type II or unspecified type diabetes mellitus with renal manifestations, not stated as uncontrolled(250.40)     Type II or unspecified type diabetes mellitus without mention of complication, not stated as uncontrolled     Unspecified vitamin D deficiency        PAST SURGICAL HISTORY:  Past Surgical History:   Procedure Laterality Date    ANKLE FRACTURE SURGERY      BREAST SURGERY      CARPAL TUNNEL RELEASE      CHOLECYSTECTOMY, OPEN N/A     CORONARY ARTERY BYPASS GRAFT      x3    HAND SURGERY      IR TUNNELED CATHETER PLACEMENT GREATER THAN 5 YEARS  8/18/2021    IR TUNNELED CATHETER PLACEMENT GREATER THAN 5 YEARS 8/18/2021 [facility] SPECIAL PROCEDURES    KNEE ARTHROSCOPY      right    TONSILLECTOMY            SOCIAL HISTORY:  Social History     Socioeconomic History    Marital status: Single     Spouse name: Not on file    Number of children: Not on file    Years of education: Not on file    Highest education level: Not on file   Occupational History    Not on file   Tobacco Use    Smoking status: Never Smoker    Smokeless tobacco: Never Used   Substance and Sexual Activity    Alcohol use: No    Drug use: No    Sexual activity: Not Currently   Other Topics Concern    Not on file   Social History Narrative    Not on file     Social Determinants of Health     Financial Resource Strain: Low Risk     Difficulty of Paying Living Expenses: Not hard at all   Food Insecurity:     Worried About [address] in the Last Year:     [address] in the Last Year:    Transportation Needs:     Lack of Transportation (Medical):      Lack of Transportation (Non-Medical):    Physical Activity:     Days of Exercise per Week:     Minutes of Exercise per Session:    Stress:     Feeling of Stress :    Social Connections:     Frequency of Communication with Friends and Family:     Frequency of Social Gatherings with Friends and Family:     Attends Rastafari Services:     Active Member of Clubs or Organizations:     Attends Club or Organization Meetings:     Marital Status:    Intimate Partner Violence:     Fear of Current or Ex-Partner:     Emotionally Abused:     Physically Abused:     Sexually Abused:        FAMILY HISTORY:  Family History   Problem Relation Age of Onset    Diabetes Father     Heart Failure Father        REVIEW OF SYSTEMS:    LIMITED DUE TO PATIENT MEMORY     General-Denies fevers, chills, admits to fatigue  Head-denies hx of head trauma, positive for HA especially when straining for memory recall  Ear-denies changes in hearing   Eye-denies changes in vision  CV-denies angina, positive for syncope  Resp-denies cough, wheeze, SOB, or hx of respiratory conditions   GI-positive for normal, soft stool and BM recently  -denies changes in urine frequency, dysuria, or hematuria  Neuro-admits to numbness and tingling of her LE primarily on the right, as well as her right arm  Psych-admits to feeling afraid due to memory loss   Neuro-positive for confusion, unsteadiness when ambulating, and scanning sided weakness TECHNOLOGIST PROVIDED HISTORY: ams, claudia sided weakness Decision Support Exception - unselect if not a suspected or confirmed emergency medical condition->Emergency Medical Condition (MA) Reason for Exam: [address] [place] [address] [id number]. Patient in dialysis and lost menatation. Patient has right side weakness, unable to tell us her name. Patient has a history of TIA's FINDINGS: BRAIN/VENTRICLES: There is no acute intracranial hemorrhage, mass effect or midline shift. No abnormal extra-axial fluid collection. The gray-white differentiation is maintained without evidence of an acute infarct. There is no evidence of hydrocephalus. Mild periventricular subcortical white matter hypoattenuation suggestive chronic small vessel ischemic disease. Vascular calcifications. ORBITS: Previous cataract surgery. SINUSES: Mild paranasal sinus disease. SOFT TISSUES/SKULL:  No acute abnormality of the visualized skull or soft tissues. No acute intracranial abnormality. Critical results were called by Dr. Joey Dong to Dr Genell Osler on 9/13/2021 at 14:32. MRA HEAD WO CONTRAST    Result Date: 9/13/2021  EXAMINATION: MRI OF THE BRAIN WITHOUT CONTRAST AND MRA HEAD WITHOUT CONTRAST 9/13/2021 9:05 pm; 9/13/2021 9:09 pm TECHNIQUE: Multiplanar multisequence MRI of the brain was performed without the administration of intravenous contrast. MRA of the head was performed utilizing time-of-flight imaging with MIP images. No intravenous contrast was administered. COMPARISON: None. HISTORY: ORDERING SYSTEM PROVIDED HISTORY: claudia sided weakness TECHNOLOGIST PROVIDED HISTORY: rLuzma sided weakness Decision Support Exception - unselect if not a suspected or confirmed emergency medical condition->Emergency Medical Condition (MA) Reason for Exam: while pt was at dialysis today had episode of LOC, not sure how long it lasted, pt c/o confusion and memory trouble since. Also c/o right side numbness and heavy feeling.   Prior stroke 8/1/21, imaging done at [facility].; ORDERING SYSTEM PROVIDED HISTORY: r. sided weakness, confusion TECHNOLOGIST PROVIDED HISTORY: r. sided weakness, confusion Decision Support Exception - unselect if not a suspected or confirmed emergency medical condition->Emergency Medical Condition (MA) Reason for Exam: while pt was at dialysis today had episode of LOC, not sure how long it lasted, pt c/o confusion and memory trouble since. Also c/o right side numbness and heavy feeling. Prior stroke 8/1/21, imaging done at [facility]. FINDINGS: MRI BRAIN: INTRACRANIAL STRUCTURES/VENTRICLES: There is no acute infarct. No mass effect or midline shift. No evidence of an acute intracranial hemorrhage. The ventricles and sulci are normal in size and configuration. The sellar/suprasellar regions appear unremarkable. The normal signal voids within the major intracranial vessels appear maintained. ORBITS: The visualized portion of the orbits demonstrate no acute abnormality. SINUSES: The visualized paranasal sinuses and mastoid air cells are well aerated. BONES/SOFT TISSUES: The bone marrow signal intensity appears normal. The soft tissues demonstrate no acute abnormality. MRA HEAD: ANTERIOR CIRCULATION: No significant stenosis of the intracranial internal carotid, anterior cerebral, or middle cerebral arteries. POSTERIOR CIRCULATION: No significant stenosis of the vertebral, basilar, or posterior cerebral arteries. ANEURYSM: No intracranial aneurysm is seen. Normal examinations. IR TUNNELED CVC PLACE WO SQ PORT/PUMP > 5 YEARS    Result Date: 8/18/2021  PROCEDURE: ULTRASOUND GUIDED VASCULAR ACCESS. FLUOROSCOPY GUIDED PLACEMENT OF A TUNNELED CATHETER. Temporary dialysis catheter removal. MODERATE CONSCIOUS SEDATION 8/17/2021.  HISTORY: ORDERING SYSTEM PROVIDED HISTORY: Need for chronic dialysis TECHNOLOGIST PROVIDED HISTORY: Need for chronic dialysis How many lumens are being requested?->2 What side should this line be placed? ->Either What site is the preferred site? ->Internal Jugular SEDATION: 1 mg versed and 50 mcg fentanyl were titrated intravenously for moderate sedation monitored under my direction. Total intra-service time of sedation was 30 minutes. The patient's vital signs were monitored throughout the procedure and recorded in the patient's medical record by a qualified procedure nurse. FLUOROSCOPY DOSE AND TYPE OR TIME AND EXPOSURES: 4 fluoroscopic images sent to PACS Fluoroscopy time 1 minutes 36 seconds D  centigrays cm squared TECHNIQUE: Informed consent was obtained after a detailed explanation of the procedure including risks, benefits, and alternatives. Universal protocol was observed. The right neck and chest were prepped and draped in sterile fashion using maximum sterile barrier technique. Local anesthesia was achieved with lidocaine. A micropuncture needle was used to access the right internal jugular vein using ultrasound guidance. An ultrasound image demonstrating patency of the vein with needle tip located within it. An image was obtained and stored in PACs. A 0.035 guidewire was used to place a peel-away sheath. A subcutaneous tunnel was created to the infraclavicular region and a tunneled 23 cm tip to cuff dialysis catheter was pulled through the subcutaneous tunnel to the venotomy site and advanced through the peel-away sheath under fluoroscopic guidance to the right atrium. The catheter flushed easily and there was a good blood return. The catheter was sutured to the skin. The catheter was locked with heparinized saline. Venotomy site was closed with 4 0 Vicryl and Dermabond. Attention was then turned to temporary dialysis catheter removal.  The area surrounding the catheter had previously been prepped and draped in sterile manner. The sutures were cut. With the patient in exhalation the catheter was removed. Pressure was held for 10 minutes due to patient's anticoagulation.   Hemostasis was obtained. A sterile dressing was applied. Post procedure x-ray demonstrates tunnel dialysis catheter with tip in the right atrium with complete removal of temporary dialysis catheter. Postsurgical change of the neck, median sternotomy, and left chest noted. The patient tolerated the procedure well and there were no immediate complications. FINDINGS: Fluoroscopic image demonstrates the tip of the catheter in the right atrium. Complete removal of temporary dialysis catheter. Successful ultrasound and fluoroscopy guided tunneled dialysis catheter placement. The catheter may be used immediately. Temporary dialysis catheter removed . MRA NECK WO CONTRAST    Result Date: 9/13/2021  EXAMINATION: MRA OF THE NECK WITHOUT CONTRAST 9/13/2021 9:10 pm TECHNIQUE: Multiplanar multisequence MRA of the neck was performed without the administration of intravenous contrast. Stenosis of the internal carotid arteries measured using NASCET criteria. COMPARISON: None. HISTORY: ORDERING SYSTEM PROVIDED HISTORY: r. sided weakness TECHNOLOGIST PROVIDED HISTORY: r. sided weakness Decision Support Exception - unselect if not a suspected or confirmed emergency medical condition->Emergency Medical Condition (MA) Reason for Exam: while pt was at dialysis today had episode of LOC, not sure how long it lasted, pt c/o confusion and memory trouble since. Also c/o right side numbness and heavy feeling. Prior stroke 8/1/21, imaging done at [facility]. FINDINGS: AORTIC ARCH/ARCH VESSELS: No dissection or arterial injury. No significant stenosis of the brachiocephalic or subclavian arteries. CAROTID ARTERIES: No dissection, arterial injury, or hemodynamically significant stenosis by NASCET criteria. There is possible non hemodynamically significant stenosis near the origin of left internal carotid artery which may be secondary to calcific atheromatous plaque.  Alternatively, this could represent turbulent flow with dropout of flow related signal. Regardless, level of stenosis is clearly less than 50%. VERTEBRAL ARTERIES: No dissection, arterial injury, or significant stenosis. No hemodynamically significant stenosis identified. Possible mild narrowing of the proximal most left ICA as detailed above. If further evaluation is desired, consider CTA or duplex ultrasound. MRI BRAIN WO CONTRAST    Result Date: 9/13/2021  EXAMINATION: MRI OF THE BRAIN WITHOUT CONTRAST AND MRA HEAD WITHOUT CONTRAST 9/13/2021 9:05 pm; 9/13/2021 9:09 pm TECHNIQUE: Multiplanar multisequence MRI of the brain was performed without the administration of intravenous contrast. MRA of the head was performed utilizing time-of-flight imaging with MIP images. No intravenous contrast was administered. COMPARISON: None. HISTORY: ORDERING SYSTEM PROVIDED HISTORY: r. sided weakness TECHNOLOGIST PROVIDED HISTORY: r. sided weakness Decision Support Exception - unselect if not a suspected or confirmed emergency medical condition->Emergency Medical Condition (MA) Reason for Exam: while pt was at dialysis today had episode of LOC, not sure how long it lasted, pt c/o confusion and memory trouble since. Also c/o right side numbness and heavy feeling. Prior stroke 8/1/21, imaging done at [facility].; ORDERING SYSTEM PROVIDED HISTORY: r. sided weakness, confusion TECHNOLOGIST PROVIDED HISTORY: r. sided weakness, confusion Decision Support Exception - unselect if not a suspected or confirmed emergency medical condition->Emergency Medical Condition (MA) Reason for Exam: while pt was at dialysis today had episode of LOC, not sure how long it lasted, pt c/o confusion and memory trouble since. Also c/o right side numbness and heavy feeling. Prior stroke 8/1/21, imaging done at [facility]. FINDINGS: MRI BRAIN: INTRACRANIAL STRUCTURES/VENTRICLES: There is no acute infarct. No mass effect or midline shift. No evidence of an acute intracranial hemorrhage.   The ventricles and sulci are normal in size and configuration. The sellar/suprasellar regions appear unremarkable. The normal signal voids within the major intracranial vessels appear maintained. ORBITS: The visualized portion of the orbits demonstrate no acute abnormality. SINUSES: The visualized paranasal sinuses and mastoid air cells are well aerated. BONES/SOFT TISSUES: The bone marrow signal intensity appears normal. The soft tissues demonstrate no acute abnormality. MRA HEAD: ANTERIOR CIRCULATION: No significant stenosis of the intracranial internal carotid, anterior cerebral, or middle cerebral arteries. POSTERIOR CIRCULATION: No significant stenosis of the vertebral, basilar, or posterior cerebral arteries. ANEURYSM: No intracranial aneurysm is seen. Normal examinations.        Lab Review  CBC     Lab Results   Component Value Date    WBC 4.5 09/16/2021    RBC 3.29 09/16/2021    HGB 10.5 09/16/2021    HCT 31.5 09/16/2021     09/16/2021    MCV 95.7 09/16/2021    MCH 32.1 09/16/2021    MCHC 33.5 09/16/2021    RDW 15.0 09/16/2021    LYMPHOPCT 23 09/13/2021    LYMPHOPCT 23.1 04/09/2015    MONOPCT 6 09/13/2021    MONOPCT 4.6 04/09/2015    EOSPCT 6.7 04/09/2015    BASOPCT 1 09/13/2021    BASOPCT 0.7 04/09/2015    MONOSABS 0.40 09/13/2021    MONOSABS 0.4 04/09/2015    LYMPHSABS 1.50 09/13/2021    LYMPHSABS 1.8 04/09/2015    EOSABS 0.20 09/13/2021    EOSABS 0.5 04/09/2015    BASOSABS 0.10 09/13/2021    DIFFTYPE NOT REPORTED 09/13/2021       BMP   Lab Results   Component Value Date     09/16/2021    K 3.7 09/16/2021     09/16/2021    CO2 27 09/16/2021    BUN 21 09/16/2021    CREATININE 2.17 09/16/2021    GLUCOSE 173 09/16/2021    CALCIUM 8.7 09/16/2021       LFTS  Lab Results   Component Value Date    ALKPHOS 93 09/16/2021    ALT 9 09/16/2021    AST 14 09/16/2021    PROT 5.9 09/16/2021    BILITOT 0.41 09/16/2021    LABALBU 3.2 09/16/2021       INR  Recent Labs     09/13/21  1409   PROTIME 15.5*   INR 1.2 APTT  No results for input(s): APTT in the last 72 hours. Lactic Acid  No results found for: LACTA     PRO-BNP   No results for input(s): PROBNP in the last 72 hours. ABGs: No results found for: PHART, PO2ART, OYZ5MRB    Lab Results   Component Value Date    MODE CONDITION NO LONGER WARRANTS 04/06/2021         Impression    1. Encephalopathy  2. Hypoxic episode  3. AURELIA  4. Atrial fibrillation  5. hypertension       Plan:      It appears that patient experienced acute encephalopathic episode that is improving based on neurology note  Patient seems weak and describes being unsteady, would benefit from PT  Possible ERICK, likely given patient body habitus and desaturation at night, will need to perform a sleep study  Patient is writing down new memories in notebook. Continue to use the diary to take notes and monitor memory improvement. Continue on eliquis for Afib  F/u with neurology if symptoms worsen    Kali BOWERS-3, medical student   9/16/21, 12:19 PM  *please see Dr. Callum De Jesus note for definitive plan*       Patient seen and examined independently by me. Above discussed and I agree with medical student note except where indicated in the EMR revision history. Also see my additional comments and changes indicated by discrete font, text color, italics, and/or initials. Labs, cultures, and radiographs where available were reviewed. Her hypoxemia at night is most likely related to her underlying ERICK which is not diagnosed. She has low overhanging soft palate, macroglossia, and underlying risk factors including atrial fibrillation, morbid obesity, and history of stroke in the past.    The occupational therapist noted that when she was ambulating without oxygen when awake she desaturated to mid 80s. Etiology of this is unclear. We will go ahead and get a chest x-ray PA and lateral.  She should also use incentive spirometry.     No need for IV fluids in the dialysis patient will Hep-Lock IV

## 2021-09-16 NOTE — PROGRESS NOTES
FAMILY MEDICINE  - PROGRESS NOTE    Date:  9/16/2021  Celia Lundberg  [id number]      Chief Complaint   Patient presents with    Altered Mental Status         Interval History:  Improved, she is in good spirits this am. Her EEG shows only mild encephalopathy. She reports that she has not had any change in her memory. She had some episodes of hypoxia last night and this am is on 2L O2 per NC. Specialists notes, labs & imaging reviewed.       Subjective  Constitutional: positive for obesity  Musculoskeletal:positive for arthralgias, back pain and myalgias  Neurological: positive for memory problems, paresthesia and weakness  Endocrine: positive for diabetic symptoms including neuropathy and hyperglycemia:    Objective:    BP (!) 113/48   Pulse 56   Temp 98.2 °F (36.8 °C) (Axillary)   Resp 18   Ht 5' 5\" (1.651 m)   Wt 241 lb 10 oz (109.6 kg)   SpO2 94%   BMI 40.21 kg/m²   General appearance - alert, well appearing, and in no distress and overweight  Mental status - alert, oriented to person, place, and time  Eyes - pupils equal and reactive, extraocular eye movements intact  Ears - hearing grossly normal bilaterally  Nose - normal and patent, no erythema, discharge or polyps  Mouth - mucous membranes moist, pharynx normal without lesions  Neck - supple, no significant adenopathy  Lymphatics - no palpable lymphadenopathy, no hepatosplenomegaly  Chest - clear to auscultation, no wheezes, rales or rhonchi, symmetric air entry  Heart - normal rate, regular rhythm, normal S1, S2, no murmurs, rubs, clicks or gallops  Abdomen - soft, nontender, nondistended, no masses or organomegaly  Breasts - not examined  Back exam - not examined  Neurological - alert, oriented, normal speech, no focal findings or movement disorder noted  Musculoskeletal - osteoarthritic changes noted in both hands  Extremities - peripheral pulses normal, no pedal edema, no clubbing or cyanosis  Skin - normal coloration and turgor, no rashes, no suspicious skin lesions noted    Data:   Medications:   Current Facility-Administered Medications   Medication Dose Route Frequency Provider Last Rate Last Admin    sodium citrate 4 % injection 1.9 mL  1.9 mL IntraCATHeter PRN Lila Huang MD   1.9 mL at 09/15/21 1445    sodium citrate 4 % injection 1.9 mL  1.9 mL IntraCATHeter PRN Lila Huang MD   1.9 mL at 09/15/21 1445    potassium chloride (KLOR-CON M) extended release tablet 40 mEq  40 mEq Oral PRN Serina Cash MD   40 mEq at 09/14/21 1304    Or    potassium bicarb-citric acid (EFFER-K) effervescent tablet 40 mEq  40 mEq Oral PRN Serina Cash MD        Or    potassium chloride 10 mEq/100 mL IVPB (Peripheral Line)  10 mEq IntraVENous PRN Serina Cash MD        0.9 % sodium chloride infusion   IntraVENous Continuous Ester Osborn  mL/hr at 09/15/21 0536 New Bag at 09/15/21 0536    sodium chloride flush 0.9 % injection 5-40 mL  5-40 mL IntraVENous 2 times per day Serina Cash MD   10 mL at 09/15/21 2141    sodium chloride flush 0.9 % injection 5-40 mL  5-40 mL IntraVENous PRN Serina Cash MD        0.9 % sodium chloride infusion  25 mL IntraVENous PRN Serina Cash MD        acetaminophen (TYLENOL) tablet 650 mg  650 mg Oral Q4H PRN Serina Cash MD   650 mg at 09/15/21 2128    ondansetron (ZOFRAN-ODT) disintegrating tablet 4 mg  4 mg Oral Q8H PRN Serina Cash MD        Or    ondansetron [facility]) injection 4 mg  4 mg IntraVENous Q6H PRN Serina Cash MD        acetaminophen (TYLENOL) tablet 650 mg  650 mg Oral Q4H PRN Serina Cash MD        aspirin chewable tablet 81 mg  81 mg Oral Daily Serina Cash MD   81 mg at 09/15/21 0842    ranolazine (RANEXA) extended release tablet 1,000 mg  1,000 mg Oral BID Serina Cash MD   1,000 mg at 09/15/21 2128    busPIRone (BUSPAR) tablet 7.5 mg  7.5 mg Oral TID Serina Cash MD 7.5 mg at 09/15/21 2129    apixaban (ELIQUIS) tablet 5 mg  5 mg Oral BID Elias Salcedo MD   5 mg at 09/15/21 2129    gabapentin (NEURONTIN) capsule 300 mg  300 mg Oral BID Elias Salcedo MD   300 mg at 09/15/21 2128    insulin glargine (LANTUS) injection vial 53 Units  53 Units SubCUTAneous BID Elias Salcedo MD   53 Units at 09/15/21 2133    atorvastatin (LIPITOR) tablet 80 mg  80 mg Oral Daily Elias Salcedo MD   80 mg at 09/15/21 0843    carvedilol (COREG) tablet 6.25 mg  6.25 mg Oral BID Elias Salcedo MD   6.25 mg at 09/14/21 0801    furosemide (LASIX) tablet 80 mg  80 mg Oral BID Elias Salcedo MD   80 mg at 09/15/21 1817    febuxostat (ULORIC) tablet 40 mg  40 mg Oral Daily Elias Salcedo MD   40 mg at 09/15/21 8053    ferrous sulfate (IRON 325) tablet 325 mg  325 mg Oral BID SUJEY Salcedo MD        polyethylene glycol (GLYCOLAX) packet 17 g  17 g Oral Daily PRN Elias Salcedo MD        senna (SENOKOT) tablet 17.2 mg  2 tablet Oral Daily PRN Elias Salcedo MD   17.2 mg at 09/14/21 0952    tamsulosin (FLOMAX) capsule 0.4 mg  0.4 mg Oral Daily Elias Salcedo MD   0.4 mg at 09/15/21 0843    pantoprazole (PROTONIX) tablet 40 mg  40 mg Oral QAM AC Elias Salcedo MD   40 mg at 09/15/21 0536    traZODone (DESYREL) tablet 75 mg  75 mg Oral Nightly Elias Salcedo MD   75 mg at 09/15/21 2128    insulin lispro (HUMALOG) injection vial 0-12 Units  0-12 Units SubCUTAneous TID SUJEY Salcedo MD   2 Units at 09/15/21 1804    insulin lispro (HUMALOG) injection vial 0-6 Units  0-6 Units SubCUTAneous Nightly Elias Salcedo MD   3 Units at 09/15/21 2133    glucose (GLUTOSE) 40 % oral gel 15 g  15 g Oral PRN Elias Salcedo MD        dextrose 50 % IV solution  12.5 g IntraVENous PRN Elias Salcedo MD        glucagon (rDNA) injection 1 mg  1 mg IntraMUSCular PRN Elias Salcedo MD        dextrose 5 % solution  100 mL/hr IntraVENous PRN Elias Salcedo MD Intake/Output Summary (Last 24 hours) at 9/16/2021 0610  Last data filed at 9/15/2021 5487  Gross per 24 hour   Intake 360 ml   Output --   Net 360 ml     Recent Results (from the past 24 hour(s))   POC Glucose Fingerstick    Collection Time: 09/15/21  7:05 AM   Result Value Ref Range    POC Glucose 115 (H) 65 - 105 mg/dL   POC Glucose Fingerstick    Collection Time: 09/15/21  5:16 PM   Result Value Ref Range    POC Glucose 199 (H) 65 - 105 mg/dL   PHOSPHORUS    Collection Time: 09/15/21  7:01 PM   Result Value Ref Range    Phosphorus 2.5 (L) 2.6 - 4.5 mg/dL   POC Glucose Fingerstick    Collection Time: 09/15/21  8:36 PM   Result Value Ref Range    POC Glucose 288 (H) 65 - 105 mg/dL   CBC    Collection Time: 09/16/21  4:23 AM   Result Value Ref Range    WBC 4.5 3.5 - 11.0 k/uL    RBC 3.29 (L) 4.0 - 5.2 m/uL    Hemoglobin 10.5 (L) 12.0 - 16.0 g/dL    Hematocrit 31.5 (L) 36 - 46 %    MCV 95.7 80 - 100 fL    MCH 32.1 26 - 34 pg    MCHC 33.5 31 - 37 g/dL    RDW 15.0 (H) 11.5 - 14.9 %    Platelets 151 (L) 415 - 450 k/uL    MPV 8.5 6.0 - 12.0 fL    NRBC Automated NOT REPORTED per 100 WBC   Comprehensive Metabolic Panel    Collection Time: 09/16/21  4:23 AM   Result Value Ref Range    Glucose 173 (H) 70 - 99 mg/dL    BUN 21 8 - 23 mg/dL    CREATININE 2.17 (H) 0.50 - 0.90 mg/dL    Bun/Cre Ratio NOT REPORTED 9 - 20    Calcium 8.7 8.6 - 10.4 mg/dL    Sodium 138 135 - 144 mmol/L    Potassium 3.7 3.7 - 5.3 mmol/L    Chloride 103 98 - 107 mmol/L    CO2 27 20 - 31 mmol/L    Anion Gap 8 (L) 9 - 17 mmol/L    Alkaline Phosphatase 93 35 - 104 U/L    ALT 9 5 - 33 U/L    AST 14 <32 U/L    Total Bilirubin 0.41 0.3 - 1.2 mg/dL    Total Protein 5.9 (L) 6.4 - 8.3 g/dL    Albumin 3.2 (L) 3.5 - 5.2 g/dL    Albumin/Globulin Ratio NOT REPORTED 1.0 - 2.5    GFR Non-African American 23 (L) >60 mL/min    GFR  28 (L) >60 mL/min    GFR Comment          GFR Staging NOT REPORTED -----------------------------------------------------------------  RAD:  EKG:  Micro:     Assessment & Plan:    Patient Active Problem List:     Coronary artery disease     Chronic diastolic heart failure (HCC)     Diabetic polyneuropathy associated with type 2 diabetes mellitus ([facility]     History of coronary artery bypass graft     Iron deficiency anemia     Spinal stenosis of lumbar region with neurogenic claudication     Mixed hyperlipidemia     Stage 4 chronic kidney disease (HCC)     Type 2 diabetes mellitus with kidney complication, with long-term current use of insulin ([facility])     Syncope and collapse     Obesity, Class II, BMI 35-39.9     Thyroid nodule greater than or equal to 1 cm in diameter incidentally noted on imaging study     Essential hypertension     Anemia in stage 4 chronic kidney disease (HCC)     Chronic ischemic heart disease     Acute cerebrovascular accident (CVA) ([facility]     Stroke-like symptoms     Morbid obesity with BMI of 40.0-44.9, adult ([facility]     Acute encephalopathy           Plan:  -Syncopal episode - acute stroke work up negative, possibly due to hypoperfusion during dialysis, continue monitoring.  -Hypoxia - ?etiology, stable on 2L O2, patient has no known lung disease, Pulmonology consulted. -RLE weakness with decreased sensation and functional weakness - patient accepted to ARU, pre-cert started. -DM2 - has peripheral neuropathy which adds to her decreased sensation, carb control diet & SS coverage in pace. -AURELIA superimposed on CKD stage 4 - recently requiring hemodialysis, Nephrology managing, defer to their assessment about hypoperfusion.  -Continue current treatments.  -Complete orders per chart.       See orders   Disposition:    Electronically signed by Camila Mack MD on 9/16/2021 at 6:10 AM

## 2021-09-16 NOTE — PROGRESS NOTES
Nephrology ESRD Progress Note    Reason for Consult:  Management of dialysis dependent end-stage renal disease. Requesting Physician: Dr Steph Benson    Interval history: Patient was seen and examined today and right-sided weakness is feeling better. She had EEG done which showed mild encephalopathy probably metabolic. She does not have shortness of breath or headache. Dialysis was done yesterday with 2.2 kg removed . History of Present Illness: This is a 59 y.o. female with past medical history of longstanding type 2 diabetes insulin-dependent diabetes mellitus, essential hypertension, coronary artery disease status post CABG in 2004, coronary artery stent in 2019, CHF with EF of 55%, history of mild to moderate LVH diastolic dysfunction, CKD 4 with baseline creatinine of about 2.2 to 2.4 mg/dL, patient initially presented to [facility] on 8/1/2021 with right-sided weakness, sudden onset patient was diagnosed with acute/subacute stroke in left frontal lobe with right-sided weakness, patient had CT angiogram during that admission and  developed acute kidney injury due to contrast-induced nephropathy requiring dialysis serum creatinine peaked to 4.9 mg/dL. Patient currently receives dialysis Monday Wednesday Friday under Dr. Gino Hooks at [facility]. Patient presented from dialysis with altered mental status. She apparently had transient loss of consciousness but unclear the duration and was confused. She has numbness over the right lower extremity and cannot feel the right side of her body which is new. Patient had MRI of the brain which showed no acute changes. MRA of the neck showed no hemodynamically significant stenosis identified. Labs on admission showed a potassium of 3.8 with BUN/creatinine of 22 and 1.77 and albumin of 4.0.     Current Medications:    sodium citrate 4 % injection 1.9 mL, PRN  sodium citrate 4 % injection 1.9 mL, PRN  potassium chloride (KLOR-CON M) extended release tablet 40 mEq, PRN   Or  potassium bicarb-citric acid (EFFER-K) effervescent tablet 40 mEq, PRN   Or  potassium chloride 10 mEq/100 mL IVPB (Peripheral Line), PRN  0.9 % sodium chloride infusion, Continuous  sodium chloride flush 0.9 % injection 5-40 mL, 2 times per day  sodium chloride flush 0.9 % injection 5-40 mL, PRN  0.9 % sodium chloride infusion, PRN  acetaminophen (TYLENOL) tablet 650 mg, Q4H PRN  ondansetron (ZOFRAN-ODT) disintegrating tablet 4 mg, Q8H PRN   Or  ondansetron (ZOFRAN) injection 4 mg, Q6H PRN  acetaminophen (TYLENOL) tablet 650 mg, Q4H PRN  aspirin chewable tablet 81 mg, Daily  ranolazine (RANEXA) extended release tablet 1,000 mg, BID  busPIRone (BUSPAR) tablet 7.5 mg, TID  apixaban (ELIQUIS) tablet 5 mg, BID  gabapentin (NEURONTIN) capsule 300 mg, BID  insulin glargine (LANTUS) injection vial 53 Units, BID  atorvastatin (LIPITOR) tablet 80 mg, Daily  carvedilol (COREG) tablet 6.25 mg, BID  furosemide (LASIX) tablet 80 mg, BID  febuxostat (ULORIC) tablet 40 mg, Daily  ferrous sulfate (IRON 325) tablet 325 mg, BID WC  polyethylene glycol (GLYCOLAX) packet 17 g, Daily PRN  senna (SENOKOT) tablet 17.2 mg, Daily PRN  tamsulosin (FLOMAX) capsule 0.4 mg, Daily  pantoprazole (PROTONIX) tablet 40 mg, QAM AC  traZODone (DESYREL) tablet 75 mg, Nightly  insulin lispro (HUMALOG) injection vial 0-12 Units, TID WC  insulin lispro (HUMALOG) injection vial 0-6 Units, Nightly  glucose (GLUTOSE) 40 % oral gel 15 g, PRN  dextrose 50 % IV solution, PRN  glucagon (rDNA) injection 1 mg, PRN  dextrose 5 % solution, PRN      Objective:  Constitutional:    CURRENT TEMPERATURE:  Temp: 97.5 °F (36.4 °C)  MAXIMUM TEMPERATURE OVER 24HRS:  Temp (24hrs), Av °F (36.7 °C), Min:97.5 °F (36.4 °C), Max:98.2 °F (36.8 °C)    CURRENT RESPIRATORY RATE:  Resp: 16  CURRENT PULSE:  Pulse: 52  CURRENT BLOOD PRESSURE:  BP: 131/63  24HR BLOOD PRESSURE RANGE:  Systolic (63EEB), ZB , Min:108 , SYR:984   ; Diastolic (58ENB), MMJ:74, frontal ischemic stroke with right-sided weakness.      4.  Peripheral edema - continue furosemide 80 mg p.o. twice daily.     5. Altered mental status/syncopal episode - Associated with borderline hypotension. MRA head and MRI brain showed no acute abnormality. Neurology input is noted. We will continue physical and occupational therapy. Prognosis is guarded.       Henok Ndiaye M.D, [facility]  Nephrologist

## 2021-09-16 NOTE — PROGRESS NOTES
Physical Therapy  Facility/Department: [facility] PROGRESSIVE CARE  Daily Treatment Note  NAME: Robert Frost  : 1960  MRN: [id number]    Date of Service: 2021    Discharge Recommendations:  Patient would benefit from continued therapy after discharge   PT Equipment Recommendations  Equipment Needed: No    Assessment   Body structures, Functions, Activity limitations: Decreased functional mobility ; Decreased strength;Decreased endurance;Decreased safe awareness;Decreased balance;Decreased cognition  Treatment Diagnosis: impaired mobility due to weakness and balance issues  Specific instructions for Next Treatment: instruct in strengthening and balance activities, instruct in standing balance and transfers/ gait  as LE strength improves  History: admitted due to acute encephalopathy  REQUIRES PT FOLLOW UP: Yes  Activity Tolerance  Activity Tolerance: Patient limited by cognitive status     Patient Diagnosis(es): The encounter diagnosis was Acute encephalopathy. has a past medical history of Backache, unspecified, CHF (congestive heart failure) ([facility] Chronic kidney disease, Coronary atherosclerosis of artery bypass graft, Cramp of limb, Gallstones, Hypertension, Insomnia, Pneumonia, Type II or unspecified type diabetes mellitus with renal manifestations, not stated as uncontrolled(250.40), Type II or unspecified type diabetes mellitus without mention of complication, not stated as uncontrolled, and Unspecified vitamin D deficiency. has a past surgical history that includes Coronary artery bypass graft; Knee arthroscopy; Carpal tunnel release; Breast surgery; Tonsillectomy; Hand surgery; Ankle fracture surgery; Cholecystectomy, open (N/A); and IR TUNNELED CVC PLACE WO SQ PORT/PUMP > 5 YEARS (2021).     Restrictions  Restrictions/Precautions  Restrictions/Precautions: Fall Risk  Required Braces or Orthoses?: Yes  Required Braces or Orthoses  Right Lower Extremity Brace:  (Lymphedema wraps)  Left Lower Extremity Brace:  (Lymphedema wraps)  Position Activity Restriction  Other position/activity restrictions: up w/ assist  Subjective   General  Chart Reviewed: Yes  Response To Previous Treatment: Patient with no complaints from previous session. Family / Caregiver Present: No  Referring Practitioner: Dr. Clarissa Glimore: Patient in bed upon arrival, recieving medication. Agreeable to therapy   General Comment  Comments: DEDE Patterson approved therapy   Pain Screening  Patient Currently in Pain: Denies  Vital Signs  Patient Currently in Pain: Denies  Oxygen Therapy  SpO2: 97 %  Pulse Oximeter Device Mode: Intermittent  Pulse Oximeter Device Location: Finger  O2 Device: Nasal cannula  O2 Flow Rate (L/min): 2 L/min       Orientation  Orientation  Overall Orientation Status: Within Functional Limits  Objective   Bed mobility  Rolling to Left: Supervision  Rolling to Right: Supervision  Supine to Sit: Supervision  Sit to Supine: Supervision  Scooting: Supervision  Transfers  Sit to Stand: Supervision  Stand to sit: Supervision  Bed to Chair: Supervision  Stand Pivot Transfers: Supervision  Ambulation  Ambulation?: Yes  Ambulation 1  Surface: level tile  Device: Rolling Walker  Assistance: Stand by assistance  Quality of Gait: slight foward flexed posture with short step lengthb   Gait Deviations: Slow Annei; Increased ELISABET; Decreased step length;Decreased step height  Distance: 10ft from bed to toilet; 20ft from toilet to bedside chair in AM; 47ft in PM   Stairs/Curb  Stairs?: No        Other exercises  Other exercises?: Yes  Other exercises 1: seated B LE exercises x15 reps with blue tband   Other exercises 2: bed mobility   Other exercises 3: sit to stand x 2 AM and PM   Other exercises 4: toilet transfer; patient completed donning and doffing brief while standing, pericare and flsuhing toilet while standing with SBA      Goals  Short term goals  Time Frame for Short term goals: 1-2 treatments/ day/ for 7 days  Short term goal 1: pt to tolerate 1/2 hour  for therapuetic exercise and activity  Short term goal 2: pt to demonstrate good technique for LE strengthening and  balance activities  Short term goal 3: pt to demonstrate  rolling using the rail w/ min x 1 and supine <> sit w/ min x 1  Short term goal 4: pt to demonstrate good sitting balance at the EOB w/ good balance  Short term goal 5: pt to demonstrate sit <> stand transfers using Evone Jerry initially and advancing to wheeled walker w/ min x 2  Short term goal 6: pt to advance to and demonstrate bed <> chair transfers using wheeled walker w/ min x 2  Short term goal 7: pt to advance to and demonstrate gait 20-30'  using wheeled walker w/ min x 2 and W/C follow  Patient Goals   Patient goals : get better    Plan    Plan  Times per week: 1-2 treatments/ day/ for 7 days  Times per day:  (1-2 treatments/ day/ for 7 days)  Specific instructions for Next Treatment: instruct in strengthening and balance activities, instruct in standing balance and transfers/ gait  as LE strength improves  Current Treatment Recommendations: Strengthening, Functional Mobility Training, Equipment Evaluation, Education, & procurement, Safety Education & Training, Gait Training, Transfer Training, Balance Training, Endurance Training, Positioning, Patient/Caregiver Education & Training  Safety Devices  Type of devices:  All fall risk precautions in place, Gait belt, Bed alarm in place, Call light within reach, Patient at risk for falls, Left in bed, Nurse notified        09/16/21 1113 09/16/21 1604   PT Individual Minutes   Time In 0845 1500   Time Out [id number]   Minutes 32 44     Electronically signed by Princess Wilkins PTA on 9/16/21 at 4:08 PM EDT

## 2021-09-17 ENCOUNTER — HOSPITAL ENCOUNTER (INPATIENT)
Age: 61
LOS: 9 days | Discharge: HOME HEALTH | DRG: 091 | End: 2021-09-26
Attending: PHYSICAL MEDICINE & REHABILITATION | Admitting: PHYSICAL MEDICINE & REHABILITATION
Payer: COMMERCIAL

## 2021-09-17 ENCOUNTER — APPOINTMENT (OUTPATIENT)
Dept: NON INVASIVE DIAGNOSTICS | Age: 61
DRG: 312 | End: 2021-09-17
Payer: COMMERCIAL

## 2021-09-17 VITALS
OXYGEN SATURATION: 95 % | SYSTOLIC BLOOD PRESSURE: 123 MMHG | HEART RATE: 55 BPM | WEIGHT: 238.1 LBS | RESPIRATION RATE: 14 BRPM | DIASTOLIC BLOOD PRESSURE: 49 MMHG | HEIGHT: 65 IN | BODY MASS INDEX: 39.67 KG/M2 | TEMPERATURE: 97.7 F

## 2021-09-17 DIAGNOSIS — E78.2 MIXED HYPERLIPIDEMIA: ICD-10-CM

## 2021-09-17 DIAGNOSIS — M48.062 SPINAL STENOSIS OF LUMBAR REGION WITH NEUROGENIC CLAUDICATION: ICD-10-CM

## 2021-09-17 DIAGNOSIS — Z99.2 TYPE 2 DIABETES MELLITUS WITH CHRONIC KIDNEY DISEASE ON CHRONIC DIALYSIS, WITH LONG-TERM CURRENT USE OF INSULIN (HCC): ICD-10-CM

## 2021-09-17 DIAGNOSIS — M54.42 CHRONIC MIDLINE LOW BACK PAIN WITH BILATERAL SCIATICA: ICD-10-CM

## 2021-09-17 DIAGNOSIS — M54.41 CHRONIC MIDLINE LOW BACK PAIN WITH BILATERAL SCIATICA: ICD-10-CM

## 2021-09-17 DIAGNOSIS — Z79.4 TYPE 2 DIABETES MELLITUS WITH CHRONIC KIDNEY DISEASE ON CHRONIC DIALYSIS, WITH LONG-TERM CURRENT USE OF INSULIN (HCC): ICD-10-CM

## 2021-09-17 DIAGNOSIS — N18.6 TYPE 2 DIABETES MELLITUS WITH CHRONIC KIDNEY DISEASE ON CHRONIC DIALYSIS, WITH LONG-TERM CURRENT USE OF INSULIN (HCC): ICD-10-CM

## 2021-09-17 DIAGNOSIS — G89.29 CHRONIC MIDLINE LOW BACK PAIN WITH BILATERAL SCIATICA: ICD-10-CM

## 2021-09-17 DIAGNOSIS — I25.10 CORONARY ARTERY DISEASE INVOLVING NATIVE CORONARY ARTERY OF NATIVE HEART WITHOUT ANGINA PECTORIS: ICD-10-CM

## 2021-09-17 DIAGNOSIS — E11.22 TYPE 2 DIABETES MELLITUS WITH CHRONIC KIDNEY DISEASE ON CHRONIC DIALYSIS, WITH LONG-TERM CURRENT USE OF INSULIN (HCC): ICD-10-CM

## 2021-09-17 PROBLEM — G93.40 ENCEPHALOPATHY: Status: ACTIVE | Noted: 2021-09-17

## 2021-09-17 LAB
ALBUMIN SERPL-MCNC: 3.4 G/DL (ref 3.5–5.2)
ALBUMIN/GLOBULIN RATIO: ABNORMAL (ref 1–2.5)
ALP BLD-CCNC: 98 U/L (ref 35–104)
ALT SERPL-CCNC: 13 U/L (ref 5–33)
ANION GAP SERPL CALCULATED.3IONS-SCNC: 9 MMOL/L (ref 9–17)
AST SERPL-CCNC: 16 U/L
BILIRUB SERPL-MCNC: 0.4 MG/DL (ref 0.3–1.2)
BUN BLDV-MCNC: 26 MG/DL (ref 8–23)
BUN/CREAT BLD: ABNORMAL (ref 9–20)
CALCIUM SERPL-MCNC: 9.2 MG/DL (ref 8.6–10.4)
CHLORIDE BLD-SCNC: 105 MMOL/L (ref 98–107)
CO2: 28 MMOL/L (ref 20–31)
CREAT SERPL-MCNC: 2.64 MG/DL (ref 0.5–0.9)
GFR AFRICAN AMERICAN: 22 ML/MIN
GFR NON-AFRICAN AMERICAN: 18 ML/MIN
GFR SERPL CREATININE-BSD FRML MDRD: ABNORMAL ML/MIN/{1.73_M2}
GFR SERPL CREATININE-BSD FRML MDRD: ABNORMAL ML/MIN/{1.73_M2}
GLUCOSE BLD-MCNC: 103 MG/DL (ref 65–105)
GLUCOSE BLD-MCNC: 111 MG/DL (ref 70–99)
GLUCOSE BLD-MCNC: 221 MG/DL (ref 65–105)
GLUCOSE BLD-MCNC: 239 MG/DL (ref 65–105)
GLUCOSE BLD-MCNC: 255 MG/DL (ref 65–105)
GLUCOSE BLD-MCNC: 259 MG/DL (ref 65–105)
HCT VFR BLD CALC: 33.7 % (ref 36–46)
HEMOGLOBIN: 11.1 G/DL (ref 12–16)
MCH RBC QN AUTO: 31.6 PG (ref 26–34)
MCHC RBC AUTO-ENTMCNC: 33 G/DL (ref 31–37)
MCV RBC AUTO: 95.6 FL (ref 80–100)
NRBC AUTOMATED: ABNORMAL PER 100 WBC
PDW BLD-RTO: 14.9 % (ref 11.5–14.9)
PLATELET # BLD: 143 K/UL (ref 150–450)
PMV BLD AUTO: 8.5 FL (ref 6–12)
POTASSIUM SERPL-SCNC: 4.2 MMOL/L (ref 3.7–5.3)
RBC # BLD: 3.53 M/UL (ref 4–5.2)
SODIUM BLD-SCNC: 142 MMOL/L (ref 135–144)
TOTAL PROTEIN: 6.4 G/DL (ref 6.4–8.3)
WBC # BLD: 4.6 K/UL (ref 3.5–11)

## 2021-09-17 PROCEDURE — 6360000002 HC RX W HCPCS: Performed by: INTERNAL MEDICINE

## 2021-09-17 PROCEDURE — 85027 COMPLETE CBC AUTOMATED: CPT

## 2021-09-17 PROCEDURE — 80053 COMPREHEN METABOLIC PANEL: CPT

## 2021-09-17 PROCEDURE — 99239 HOSP IP/OBS DSCHRG MGMT >30: CPT | Performed by: FAMILY MEDICINE

## 2021-09-17 PROCEDURE — 6370000000 HC RX 637 (ALT 250 FOR IP): Performed by: INTERNAL MEDICINE

## 2021-09-17 PROCEDURE — 82947 ASSAY GLUCOSE BLOOD QUANT: CPT

## 2021-09-17 PROCEDURE — 6370000000 HC RX 637 (ALT 250 FOR IP): Performed by: FAMILY MEDICINE

## 2021-09-17 PROCEDURE — 93308 TTE F-UP OR LMTD: CPT

## 2021-09-17 PROCEDURE — 97130 THER IVNTJ EA ADDL 15 MIN: CPT

## 2021-09-17 PROCEDURE — 2580000003 HC RX 258: Performed by: FAMILY MEDICINE

## 2021-09-17 PROCEDURE — 1180000000 HC REHAB R&B

## 2021-09-17 PROCEDURE — 36415 COLL VENOUS BLD VENIPUNCTURE: CPT

## 2021-09-17 PROCEDURE — 92507 TX SP LANG VOICE COMM INDIV: CPT

## 2021-09-17 PROCEDURE — 97129 THER IVNTJ 1ST 15 MIN: CPT

## 2021-09-17 PROCEDURE — P9047 ALBUMIN (HUMAN), 25%, 50ML: HCPCS | Performed by: INTERNAL MEDICINE

## 2021-09-17 PROCEDURE — 90935 HEMODIALYSIS ONE EVALUATION: CPT

## 2021-09-17 PROCEDURE — 93970 EXTREMITY STUDY: CPT

## 2021-09-17 PROCEDURE — 2500000003 HC RX 250 WO HCPCS: Performed by: INTERNAL MEDICINE

## 2021-09-17 RX ORDER — SODIUM CITRATE 4 % (5 ML)
1.9 SYRINGE (ML) MISCELLANEOUS PRN
Status: DISCONTINUED | OUTPATIENT
Start: 2021-09-17 | End: 2021-09-24 | Stop reason: CLARIF

## 2021-09-17 RX ORDER — TAMSULOSIN HYDROCHLORIDE 0.4 MG/1
0.4 CAPSULE ORAL DAILY
Status: CANCELLED | OUTPATIENT
Start: 2021-09-18

## 2021-09-17 RX ORDER — SODIUM CITRATE 4 % (5 ML)
1.9 SYRINGE (ML) MISCELLANEOUS PRN
Status: CANCELLED | OUTPATIENT
Start: 2021-09-17

## 2021-09-17 RX ORDER — SENNA PLUS 8.6 MG/1
2 TABLET ORAL DAILY PRN
Status: DISCONTINUED | OUTPATIENT
Start: 2021-09-17 | End: 2021-09-26 | Stop reason: HOSPADM

## 2021-09-17 RX ORDER — ASPIRIN 81 MG/1
81 TABLET, CHEWABLE ORAL DAILY
Status: CANCELLED | OUTPATIENT
Start: 2021-09-18

## 2021-09-17 RX ORDER — INSULIN GLARGINE 100 [IU]/ML
53 INJECTION, SOLUTION SUBCUTANEOUS 2 TIMES DAILY
Status: CANCELLED | OUTPATIENT
Start: 2021-09-17

## 2021-09-17 RX ORDER — FUROSEMIDE 40 MG/1
80 TABLET ORAL 2 TIMES DAILY
Status: CANCELLED | OUTPATIENT
Start: 2021-09-17

## 2021-09-17 RX ORDER — RANOLAZINE 500 MG/1
1000 TABLET, EXTENDED RELEASE ORAL 2 TIMES DAILY
Status: CANCELLED | OUTPATIENT
Start: 2021-09-17

## 2021-09-17 RX ORDER — FUROSEMIDE 40 MG/1
80 TABLET ORAL 2 TIMES DAILY
Status: DISCONTINUED | OUTPATIENT
Start: 2021-09-18 | End: 2021-09-26 | Stop reason: HOSPADM

## 2021-09-17 RX ORDER — PANTOPRAZOLE SODIUM 40 MG/1
40 TABLET, DELAYED RELEASE ORAL
Status: DISCONTINUED | OUTPATIENT
Start: 2021-09-18 | End: 2021-09-26 | Stop reason: HOSPADM

## 2021-09-17 RX ORDER — POTASSIUM CHLORIDE 7.45 MG/ML
10 INJECTION INTRAVENOUS PRN
Status: CANCELLED | OUTPATIENT
Start: 2021-09-17

## 2021-09-17 RX ORDER — TAMSULOSIN HYDROCHLORIDE 0.4 MG/1
0.4 CAPSULE ORAL DAILY
Status: DISCONTINUED | OUTPATIENT
Start: 2021-09-18 | End: 2021-09-26 | Stop reason: HOSPADM

## 2021-09-17 RX ORDER — SODIUM CHLORIDE 0.9 % (FLUSH) 0.9 %
5-40 SYRINGE (ML) INJECTION EVERY 12 HOURS SCHEDULED
Status: DISCONTINUED | OUTPATIENT
Start: 2021-09-17 | End: 2021-09-17

## 2021-09-17 RX ORDER — ACETAMINOPHEN 325 MG/1
650 TABLET ORAL EVERY 4 HOURS PRN
Status: DISCONTINUED | OUTPATIENT
Start: 2021-09-17 | End: 2021-09-18 | Stop reason: SDUPTHER

## 2021-09-17 RX ORDER — ACETAMINOPHEN 325 MG/1
650 TABLET ORAL EVERY 4 HOURS PRN
Status: CANCELLED | OUTPATIENT
Start: 2021-09-17

## 2021-09-17 RX ORDER — TRAZODONE HYDROCHLORIDE 150 MG/1
75 TABLET ORAL NIGHTLY
Status: DISCONTINUED | OUTPATIENT
Start: 2021-09-17 | End: 2021-09-26 | Stop reason: HOSPADM

## 2021-09-17 RX ORDER — FERROUS SULFATE 325(65) MG
325 TABLET ORAL 2 TIMES DAILY WITH MEALS
Status: DISCONTINUED | OUTPATIENT
Start: 2021-09-18 | End: 2021-09-21

## 2021-09-17 RX ORDER — POLYETHYLENE GLYCOL 3350 17 G/17G
17 POWDER, FOR SOLUTION ORAL DAILY PRN
Status: CANCELLED | OUTPATIENT
Start: 2021-09-17

## 2021-09-17 RX ORDER — DEXTROSE MONOHYDRATE 25 G/50ML
12.5 INJECTION, SOLUTION INTRAVENOUS PRN
Status: DISCONTINUED | OUTPATIENT
Start: 2021-09-17 | End: 2021-09-26 | Stop reason: HOSPADM

## 2021-09-17 RX ORDER — SENNA PLUS 8.6 MG/1
2 TABLET ORAL DAILY PRN
Status: CANCELLED | OUTPATIENT
Start: 2021-09-17

## 2021-09-17 RX ORDER — DEXTROSE MONOHYDRATE 50 MG/ML
100 INJECTION, SOLUTION INTRAVENOUS PRN
Status: DISCONTINUED | OUTPATIENT
Start: 2021-09-17 | End: 2021-09-26 | Stop reason: HOSPADM

## 2021-09-17 RX ORDER — NICOTINE POLACRILEX 4 MG
15 LOZENGE BUCCAL PRN
Status: CANCELLED | OUTPATIENT
Start: 2021-09-17

## 2021-09-17 RX ORDER — POLYETHYLENE GLYCOL 3350 17 G/17G
17 POWDER, FOR SOLUTION ORAL DAILY PRN
Status: DISCONTINUED | OUTPATIENT
Start: 2021-09-17 | End: 2021-09-26 | Stop reason: HOSPADM

## 2021-09-17 RX ORDER — SODIUM CHLORIDE 9 MG/ML
25 INJECTION, SOLUTION INTRAVENOUS PRN
Status: DISCONTINUED | OUTPATIENT
Start: 2021-09-17 | End: 2021-09-26 | Stop reason: HOSPADM

## 2021-09-17 RX ORDER — SODIUM CHLORIDE 0.9 % (FLUSH) 0.9 %
5-40 SYRINGE (ML) INJECTION PRN
Status: CANCELLED | OUTPATIENT
Start: 2021-09-17

## 2021-09-17 RX ORDER — CARVEDILOL 6.25 MG/1
6.25 TABLET ORAL 2 TIMES DAILY
Status: CANCELLED | OUTPATIENT
Start: 2021-09-17

## 2021-09-17 RX ORDER — POLYETHYLENE GLYCOL 3350 17 G/17G
17 POWDER, FOR SOLUTION ORAL DAILY
Status: DISCONTINUED | OUTPATIENT
Start: 2021-09-17 | End: 2021-09-26 | Stop reason: HOSPADM

## 2021-09-17 RX ORDER — SENNA PLUS 8.6 MG/1
2 TABLET ORAL DAILY PRN
Status: DISCONTINUED | OUTPATIENT
Start: 2021-09-17 | End: 2021-09-17

## 2021-09-17 RX ORDER — NICOTINE POLACRILEX 4 MG
15 LOZENGE BUCCAL PRN
Status: DISCONTINUED | OUTPATIENT
Start: 2021-09-17 | End: 2021-09-26 | Stop reason: HOSPADM

## 2021-09-17 RX ORDER — FERROUS SULFATE 325(65) MG
325 TABLET ORAL 2 TIMES DAILY WITH MEALS
Status: CANCELLED | OUTPATIENT
Start: 2021-09-17

## 2021-09-17 RX ORDER — MIDODRINE HYDROCHLORIDE 5 MG/1
5 TABLET ORAL PRN
Status: DISPENSED | OUTPATIENT
Start: 2021-09-17 | End: 2021-09-17

## 2021-09-17 RX ORDER — DEXTROSE MONOHYDRATE 50 MG/ML
100 INJECTION, SOLUTION INTRAVENOUS PRN
Status: CANCELLED | OUTPATIENT
Start: 2021-09-17

## 2021-09-17 RX ORDER — GABAPENTIN 300 MG/1
300 CAPSULE ORAL 2 TIMES DAILY
Status: DISCONTINUED | OUTPATIENT
Start: 2021-09-17 | End: 2021-09-26 | Stop reason: HOSPADM

## 2021-09-17 RX ORDER — TRAZODONE HYDROCHLORIDE 50 MG/1
75 TABLET ORAL NIGHTLY
Status: CANCELLED | OUTPATIENT
Start: 2021-09-17

## 2021-09-17 RX ORDER — ATORVASTATIN CALCIUM 80 MG/1
80 TABLET, FILM COATED ORAL DAILY
Status: CANCELLED | OUTPATIENT
Start: 2021-09-18

## 2021-09-17 RX ORDER — ONDANSETRON 2 MG/ML
4 INJECTION INTRAMUSCULAR; INTRAVENOUS EVERY 6 HOURS PRN
Status: DISCONTINUED | OUTPATIENT
Start: 2021-09-17 | End: 2021-09-17

## 2021-09-17 RX ORDER — SODIUM CHLORIDE 9 MG/ML
25 INJECTION, SOLUTION INTRAVENOUS PRN
Status: CANCELLED | OUTPATIENT
Start: 2021-09-17

## 2021-09-17 RX ORDER — CARVEDILOL 6.25 MG/1
6.25 TABLET ORAL 2 TIMES DAILY
Status: DISCONTINUED | OUTPATIENT
Start: 2021-09-17 | End: 2021-09-26 | Stop reason: HOSPADM

## 2021-09-17 RX ORDER — BISACODYL 10 MG
10 SUPPOSITORY, RECTAL RECTAL DAILY PRN
Status: DISCONTINUED | OUTPATIENT
Start: 2021-09-17 | End: 2021-09-26 | Stop reason: HOSPADM

## 2021-09-17 RX ORDER — ONDANSETRON 4 MG/1
4 TABLET, ORALLY DISINTEGRATING ORAL EVERY 8 HOURS PRN
Status: CANCELLED | OUTPATIENT
Start: 2021-09-17

## 2021-09-17 RX ORDER — GABAPENTIN 300 MG/1
300 CAPSULE ORAL 2 TIMES DAILY
Status: CANCELLED | OUTPATIENT
Start: 2021-09-17

## 2021-09-17 RX ORDER — ONDANSETRON 4 MG/1
4 TABLET, ORALLY DISINTEGRATING ORAL EVERY 8 HOURS PRN
Status: DISCONTINUED | OUTPATIENT
Start: 2021-09-17 | End: 2021-09-26 | Stop reason: HOSPADM

## 2021-09-17 RX ORDER — FEBUXOSTAT 40 MG/1
40 TABLET, FILM COATED ORAL DAILY
Status: DISCONTINUED | OUTPATIENT
Start: 2021-09-18 | End: 2021-09-26 | Stop reason: HOSPADM

## 2021-09-17 RX ORDER — PANTOPRAZOLE SODIUM 40 MG/1
40 TABLET, DELAYED RELEASE ORAL
Status: CANCELLED | OUTPATIENT
Start: 2021-09-18

## 2021-09-17 RX ORDER — SODIUM CHLORIDE 0.9 % (FLUSH) 0.9 %
5-40 SYRINGE (ML) INJECTION PRN
Status: DISCONTINUED | OUTPATIENT
Start: 2021-09-17 | End: 2021-09-26 | Stop reason: HOSPADM

## 2021-09-17 RX ORDER — ALBUMIN (HUMAN) 12.5 G/50ML
25 SOLUTION INTRAVENOUS PRN
Status: DISCONTINUED | OUTPATIENT
Start: 2021-09-17 | End: 2021-09-17 | Stop reason: HOSPADM

## 2021-09-17 RX ORDER — FEBUXOSTAT 40 MG/1
40 TABLET, FILM COATED ORAL DAILY
Status: CANCELLED | OUTPATIENT
Start: 2021-09-18

## 2021-09-17 RX ORDER — INSULIN GLARGINE 100 [IU]/ML
53 INJECTION, SOLUTION SUBCUTANEOUS 2 TIMES DAILY
Status: DISCONTINUED | OUTPATIENT
Start: 2021-09-17 | End: 2021-09-26 | Stop reason: HOSPADM

## 2021-09-17 RX ORDER — POTASSIUM CHLORIDE 7.45 MG/ML
10 INJECTION INTRAVENOUS PRN
Status: DISCONTINUED | OUTPATIENT
Start: 2021-09-17 | End: 2021-09-24

## 2021-09-17 RX ORDER — SODIUM CHLORIDE 0.9 % (FLUSH) 0.9 %
5-40 SYRINGE (ML) INJECTION EVERY 12 HOURS SCHEDULED
Status: CANCELLED | OUTPATIENT
Start: 2021-09-17

## 2021-09-17 RX ORDER — DEXTROSE MONOHYDRATE 25 G/50ML
12.5 INJECTION, SOLUTION INTRAVENOUS PRN
Status: CANCELLED | OUTPATIENT
Start: 2021-09-17

## 2021-09-17 RX ORDER — POTASSIUM CHLORIDE 20 MEQ/1
40 TABLET, EXTENDED RELEASE ORAL PRN
Status: CANCELLED | OUTPATIENT
Start: 2021-09-17

## 2021-09-17 RX ORDER — BUSPIRONE HYDROCHLORIDE 5 MG/1
7.5 TABLET ORAL 3 TIMES DAILY
Status: CANCELLED | OUTPATIENT
Start: 2021-09-17

## 2021-09-17 RX ORDER — ATORVASTATIN CALCIUM 80 MG/1
80 TABLET, FILM COATED ORAL DAILY
Status: DISCONTINUED | OUTPATIENT
Start: 2021-09-18 | End: 2021-09-26 | Stop reason: HOSPADM

## 2021-09-17 RX ORDER — ASPIRIN 81 MG/1
81 TABLET, CHEWABLE ORAL DAILY
Status: DISCONTINUED | OUTPATIENT
Start: 2021-09-18 | End: 2021-09-26 | Stop reason: HOSPADM

## 2021-09-17 RX ORDER — BUSPIRONE HYDROCHLORIDE 7.5 MG/1
7.5 TABLET ORAL 3 TIMES DAILY
Status: DISCONTINUED | OUTPATIENT
Start: 2021-09-17 | End: 2021-09-26 | Stop reason: HOSPADM

## 2021-09-17 RX ORDER — ONDANSETRON 2 MG/ML
4 INJECTION INTRAMUSCULAR; INTRAVENOUS EVERY 6 HOURS PRN
Status: CANCELLED | OUTPATIENT
Start: 2021-09-17

## 2021-09-17 RX ORDER — POTASSIUM CHLORIDE 20 MEQ/1
40 TABLET, EXTENDED RELEASE ORAL PRN
Status: DISCONTINUED | OUTPATIENT
Start: 2021-09-17 | End: 2021-09-24

## 2021-09-17 RX ORDER — ACETAMINOPHEN 325 MG/1
650 TABLET ORAL EVERY 4 HOURS PRN
Status: DISCONTINUED | OUTPATIENT
Start: 2021-09-17 | End: 2021-09-26 | Stop reason: HOSPADM

## 2021-09-17 RX ORDER — ALBUMIN (HUMAN) 12.5 G/50ML
25 SOLUTION INTRAVENOUS PRN
Status: COMPLETED | OUTPATIENT
Start: 2021-09-17 | End: 2021-09-20

## 2021-09-17 RX ORDER — RANOLAZINE 1000 MG/1
1000 TABLET, EXTENDED RELEASE ORAL 2 TIMES DAILY
Status: DISCONTINUED | OUTPATIENT
Start: 2021-09-17 | End: 2021-09-26 | Stop reason: HOSPADM

## 2021-09-17 RX ORDER — ALBUMIN (HUMAN) 12.5 G/50ML
25 SOLUTION INTRAVENOUS PRN
Status: CANCELLED | OUTPATIENT
Start: 2021-09-17

## 2021-09-17 RX ADMIN — GABAPENTIN 300 MG: 300 CAPSULE ORAL at 22:56

## 2021-09-17 RX ADMIN — APIXABAN 5 MG: 5 TABLET, FILM COATED ORAL at 08:05

## 2021-09-17 RX ADMIN — INSULIN LISPRO 4 UNITS: 100 INJECTION, SOLUTION INTRAVENOUS; SUBCUTANEOUS at 13:46

## 2021-09-17 RX ADMIN — Medication 1.9 ML: at 12:53

## 2021-09-17 RX ADMIN — CARVEDILOL 6.25 MG: 6.25 TABLET, FILM COATED ORAL at 22:56

## 2021-09-17 RX ADMIN — SODIUM CHLORIDE, PRESERVATIVE FREE 5 ML: 5 INJECTION INTRAVENOUS at 11:39

## 2021-09-17 RX ADMIN — POLYETHYLENE GLYCOL 3350 17 G: 17 POWDER, FOR SOLUTION ORAL at 17:47

## 2021-09-17 RX ADMIN — APIXABAN 5 MG: 5 TABLET, FILM COATED ORAL at 22:56

## 2021-09-17 RX ADMIN — INSULIN GLARGINE 53 UNITS: 100 INJECTION, SOLUTION SUBCUTANEOUS at 23:02

## 2021-09-17 RX ADMIN — ATORVASTATIN CALCIUM 80 MG: 80 TABLET, FILM COATED ORAL at 08:05

## 2021-09-17 RX ADMIN — BUSPIRONE HYDROCHLORIDE 7.5 MG: 5 TABLET ORAL at 13:46

## 2021-09-17 RX ADMIN — FEBUXOSTAT 40 MG: 40 TABLET, FILM COATED ORAL at 13:46

## 2021-09-17 RX ADMIN — SENNOSIDES 17.2 MG: 8.6 TABLET, COATED ORAL at 17:47

## 2021-09-17 RX ADMIN — INSULIN GLARGINE 53 UNITS: 100 INJECTION, SOLUTION SUBCUTANEOUS at 08:05

## 2021-09-17 RX ADMIN — GABAPENTIN 300 MG: 300 CAPSULE ORAL at 08:05

## 2021-09-17 RX ADMIN — ASPIRIN 81 MG: 81 TABLET, CHEWABLE ORAL at 08:05

## 2021-09-17 RX ADMIN — TAMSULOSIN HYDROCHLORIDE 0.4 MG: 0.4 CAPSULE ORAL at 11:38

## 2021-09-17 RX ADMIN — INSULIN LISPRO 6 UNITS: 100 INJECTION, SOLUTION INTRAVENOUS; SUBCUTANEOUS at 17:50

## 2021-09-17 RX ADMIN — PANTOPRAZOLE SODIUM 40 MG: 40 TABLET, DELAYED RELEASE ORAL at 06:13

## 2021-09-17 RX ADMIN — INSULIN LISPRO 3 UNITS: 100 INJECTION, SOLUTION INTRAVENOUS; SUBCUTANEOUS at 23:03

## 2021-09-17 RX ADMIN — BUSPIRONE HYDROCHLORIDE 7.5 MG: 5 TABLET ORAL at 11:32

## 2021-09-17 RX ADMIN — MIDODRINE HYDROCHLORIDE 5 MG: 5 TABLET ORAL at 11:30

## 2021-09-17 RX ADMIN — ALBUMIN (HUMAN) 25 G: 0.25 INJECTION, SOLUTION INTRAVENOUS at 12:23

## 2021-09-17 RX ADMIN — RANOLAZINE 1000 MG: 500 TABLET, FILM COATED, EXTENDED RELEASE ORAL at 08:03

## 2021-09-17 RX ADMIN — RANOLAZINE 1000 MG: 1000 TABLET, FILM COATED, EXTENDED RELEASE ORAL at 22:56

## 2021-09-17 RX ADMIN — BUSPIRONE HYDROCHLORIDE 7.5 MG: 7.5 TABLET ORAL at 22:56

## 2021-09-17 RX ADMIN — TRAZODONE HYDROCHLORIDE 75 MG: 150 TABLET ORAL at 22:56

## 2021-09-17 RX ADMIN — FUROSEMIDE 80 MG: 40 TABLET ORAL at 17:48

## 2021-09-17 RX ADMIN — FERROUS SULFATE TAB 325 MG (65 MG ELEMENTAL FE) 325 MG: 325 (65 FE) TAB at 08:05

## 2021-09-17 ASSESSMENT — PAIN SCALES - GENERAL
PAINLEVEL_OUTOF10: 0

## 2021-09-17 NOTE — PROGRESS NOTES
FAMILY MEDICINE  - PROGRESS NOTE    Date:  9/17/2021  Mirella Garnica  [id number]      Chief Complaint   Patient presents with    Altered Mental Status         Interval History:  Improved, she is in good spirits this am. She reports that she has not had any change in her memory. She has been stable on 2L O2 per NC. Specialists notes, labs & imaging reviewed.       Subjective  Constitutional: positive for obesity  Musculoskeletal:positive for arthralgias, back pain and myalgias  Neurological: positive for memory problems, paresthesia and weakness  Endocrine: positive for diabetic symptoms including neuropathy and hyperglycemia:    Objective:    BP (!) 124/58   Pulse 52   Temp 95.6 °F (35.3 °C) (Oral)   Resp 15   Ht 5' 5\" (1.651 m)   Wt 241 lb 10 oz (109.6 kg)   SpO2 94%   BMI 40.21 kg/m²   General appearance - alert, well appearing, and in no distress and overweight  Mental status - alert, oriented to person, place, and time  Eyes - pupils equal and reactive, extraocular eye movements intact  Ears - hearing grossly normal bilaterally  Nose - normal and patent, no erythema, discharge or polyps  Mouth - mucous membranes moist, pharynx normal without lesions  Neck - supple, no significant adenopathy  Lymphatics - no palpable lymphadenopathy, no hepatosplenomegaly  Chest - clear to auscultation, no wheezes, rales or rhonchi, symmetric air entry  Heart - normal rate, regular rhythm, normal S1, S2, no murmurs, rubs, clicks or gallops  Abdomen - soft, nontender, nondistended, no masses or organomegaly  Breasts - not examined  Back exam - not examined  Neurological - alert, oriented, normal speech, no focal findings or movement disorder noted  Musculoskeletal - osteoarthritic changes noted in both hands  Extremities - peripheral pulses normal, no pedal edema, no clubbing or cyanosis  Skin - normal coloration and turgor, no rashes, no suspicious skin lesions noted    Data:   Medications:   Current Facility-Administered Medications   Medication Dose Route Frequency Provider Last Rate Last Admin    sodium citrate 4 % injection 1.9 mL  1.9 mL IntraCATHeter PRN Nicolette Avalos MD   1.9 mL at 09/15/21 1445    sodium citrate 4 % injection 1.9 mL  1.9 mL IntraCATHeter PRN Nicolette Avalos MD   1.9 mL at 09/15/21 1445    potassium chloride (KLOR-CON M) extended release tablet 40 mEq  40 mEq Oral PRN Jaki Miranda MD   40 mEq at 09/14/21 1304    Or    potassium bicarb-citric acid (EFFER-K) effervescent tablet 40 mEq  40 mEq Oral PRN Jaki Miranda MD        Or    potassium chloride 10 mEq/100 mL IVPB (Peripheral Line)  10 mEq IntraVENous PRN Jaki Miranda MD        sodium chloride flush 0.9 % injection 5-40 mL  5-40 mL IntraVENous 2 times per day Jaki Miranda MD   10 mL at 09/16/21 2114    sodium chloride flush 0.9 % injection 5-40 mL  5-40 mL IntraVENous PRN Jaki Miranda MD        0.9 % sodium chloride infusion  25 mL IntraVENous PRN Jaki Miranda MD        acetaminophen (TYLENOL) tablet 650 mg  650 mg Oral Q4H PRN Jaki Miranda MD   650 mg at 09/15/21 2128    ondansetron (ZOFRAN-ODT) disintegrating tablet 4 mg  4 mg Oral Q8H PRN Jaki Miranda MD        Or    ondansetron [facility]) injection 4 mg  4 mg IntraVENous Q6H PRN Jaki Miranda MD        acetaminophen (TYLENOL) tablet 650 mg  650 mg Oral Q4H PRN Jaki Miranda MD        aspirin chewable tablet 81 mg  81 mg Oral Daily Jaki Miranda MD   81 mg at 09/16/21 0841    ranolazine (RANEXA) extended release tablet 1,000 mg  1,000 mg Oral BID Jaki Miranda MD   1,000 mg at 09/16/21 2114    busPIRone (BUSPAR) tablet 7.5 mg  7.5 mg Oral TID Jaki Miranda MD   7.5 mg at 09/16/21 2118    apixaban (ELIQUIS) tablet 5 mg  5 mg Oral BID Jaki Miranda MD   5 mg at 09/16/21 2112    gabapentin (NEURONTIN) capsule 300 mg  300 mg Oral BID Jaki Miranda MD   300 mg at 09/16/21 2115    insulin glargine (LANTUS) injection vial 53 Units  53 Units SubCUTAneous BID Thao Louie MD   53 Units at 09/16/21 2118    atorvastatin (LIPITOR) tablet 80 mg  80 mg Oral Daily Thao Louie MD   80 mg at 09/16/21 0841    carvedilol (COREG) tablet 6.25 mg  6.25 mg Oral BID Thao Louie MD   6.25 mg at 09/14/21 0801    furosemide (LASIX) tablet 80 mg  80 mg Oral BID Thao Louie MD   80 mg at 09/16/21 1741    febuxostat (ULORIC) tablet 40 mg  40 mg Oral Daily Thao Louie MD   40 mg at 09/16/21 0841    ferrous sulfate (IRON 325) tablet 325 mg  325 mg Oral BID  Thao Louie MD        polyethylene glycol (GLYCOLAX) packet 17 g  17 g Oral Daily PRN Thao Louie MD        senna (SENOKOT) tablet 17.2 mg  2 tablet Oral Daily PRN Thao Louie MD   17.2 mg at 09/16/21 0840    tamsulosin (FLOMAX) capsule 0.4 mg  0.4 mg Oral Daily Thao Louie MD   0.4 mg at 09/16/21 0841    pantoprazole (PROTONIX) tablet 40 mg  40 mg Oral QAM AC Thao Louie MD   40 mg at 09/17/21 5311    traZODone (DESYREL) tablet 75 mg  75 mg Oral Nightly Thao Louie MD   75 mg at 09/16/21 2114    insulin lispro (HUMALOG) injection vial 0-12 Units  0-12 Units SubCUTAneous TID  Thao Louie MD   6 Units at 09/16/21 1732    insulin lispro (HUMALOG) injection vial 0-6 Units  0-6 Units SubCUTAneous Nightly Thao Louie MD   2 Units at 09/16/21 2118    glucose (GLUTOSE) 40 % oral gel 15 g  15 g Oral PRN Thao Louie MD        dextrose 50 % IV solution  12.5 g IntraVENous NADIR Louie MD        glucagon (rDNA) injection 1 mg  1 mg IntraMUSCular PRANAHY Louie MD        dextrose 5 % solution  100 mL/hr IntraVENous NADIR Louie MD           Intake/Output Summary (Last 24 hours) at 9/17/2021 0719  Last data filed at 9/16/2021 1808  Gross per 24 hour   Intake 1589 ml   Output --   Net 1589 ml     Recent Results (from the past 24 hour(s))   POC Glucose Fingerstick    Collection Time: 09/16/21  7:21 AM   Result Value Ref Range    POC Glucose 158 (H) 65 - 105 mg/dL   POC Glucose Fingerstick    Collection Time: 09/16/21 11:07 AM   Result Value Ref Range    POC Glucose 311 (H) 65 - 105 mg/dL   POC Glucose Fingerstick    Collection Time: 09/16/21  4:01 PM   Result Value Ref Range    POC Glucose 289 (H) 65 - 105 mg/dL   POC Glucose Fingerstick    Collection Time: 09/16/21  8:18 PM   Result Value Ref Range    POC Glucose 204 (H) 65 - 105 mg/dL     -----------------------------------------------------------------  RAD:  EKG:  Micro:     Assessment & Plan:    Patient Active Problem List:     Coronary artery disease     Chronic diastolic heart failure (HCC)     Diabetic polyneuropathy associated with type 2 diabetes mellitus ([facility] [address]     History of coronary artery bypass graft     Iron deficiency anemia     Spinal stenosis of lumbar region with neurogenic claudication     Mixed hyperlipidemia     Stage 4 chronic kidney disease (HCC)     Type 2 diabetes mellitus with kidney complication, with long-term current use of insulin ([facility])     Syncope and collapse     Obesity, Class II, BMI 35-39.9     Thyroid nodule greater than or equal to 1 cm in diameter incidentally noted on imaging study     Essential hypertension     Anemia in stage 4 chronic kidney disease (HCC)     Chronic ischemic heart disease     Acute cerebrovascular accident (CVA) ([facility] [address]     Stroke-like symptoms     Morbid obesity with BMI of 40.0-44.9, adult ([facility] [address]     Acute encephalopathy           Plan:  -Syncopal episode - acute stroke work up negative, possibly due to hypoperfusion during dialysis, continue monitoring.  -Hypoxia - ?etiology, stable on 2L O2, patient has no known lung disease, undiagnosed ERICK suspected, Pulmonology following. -RLE weakness with decreased sensation and functional weakness - patient accepted to ARU, pre-cert started.   -DM2 - has peripheral neuropathy which adds to her decreased sensation, carb control diet & SS coverage in pace. -AURELIA superimposed on CKD stage 4 - recently requiring hemodialysis, Nephrology managing, defer to their assessment about hypoperfusion.  -Okay to D/C and transfer to ARU. -Complete orders per chart.       See orders   Disposition:    Electronically signed by Radha Bowie MD on 9/17/2021 at 7:19 AM

## 2021-09-17 NOTE — FLOWSHEET NOTE
Patient shared update on what she's remembering and how she's feeling. She was appreciative of writer's listening presence and emotional support. Writer left when transport arrived to take patient for x-rays. 09/16/21 2006   Encounter Summary   Services provided to: Patient   Referral/Consult From: [facility]   (9-16-21)   Complexity of Encounter Moderate   Length of Encounter 15 minutes   Grief and Life Adjustment   Type Adjustment to illness   Assessment Approachable   Intervention Active listening;Explored feelings, thoughts, concerns;Explored coping resources;Sustaining presence/ Ministry of presence; Discussed illness/injury and it's impact   Outcome Expressed gratitude;Engaged in conversation;Expressed feelings/needs/concerns;Receptive;Encouraged;Venting emotion

## 2021-09-17 NOTE — PROGRESS NOTES
Nephrology ESRD Progress Note    Reason for Consult:  Management of dialysis dependent end-stage renal disease. Requesting Physician: Dr Gregory Nation    Interval history: Patient was seen and examined today and right-sided weakness is feeling better. Patient was seen on dialysis and denies any new complaints. She has no shortness of breath or dizziness. UF goal is set for 2 kg. She had EEG done which showed mild encephalopathy probably metabolic. History of Present Illness: This is a 59 y.o. female with past medical history of longstanding type 2 diabetes insulin-dependent diabetes mellitus, essential hypertension, coronary artery disease status post CABG in 2004, coronary artery stent in 2019, CHF with EF of 55%, history of mild to moderate LVH diastolic dysfunction, CKD 4 with baseline creatinine of about 2.2 to 2.4 mg/dL, patient initially presented to [facility] 8/[facility] with right-sided weakness, sudden onset patient was diagnosed with acute/subacute stroke in left frontal lobe with right-sided weakness, patient had CT angiogram during that admission and  developed acute kidney injury due to contrast-induced nephropathy requiring dialysis serum creatinine peaked to 4.9 mg/dL. Patient currently receives dialysis Monday Wednesday Friday under Dr. Margaret Ross at [facility]. Patient presented from dialysis with altered mental status. She apparently had transient loss of consciousness but unclear the duration and was confused. She has numbness over the right lower extremity and cannot feel the right side of her body which is new. Patient had MRI of the brain which showed no acute changes. MRA of the neck showed no hemodynamically significant stenosis identified. Labs on admission showed a potassium of 3.8 with BUN/creatinine of 22 and 1.77 and albumin of 4.0.     Current Medications:    perflutren lipid microspheres (DEFINITY) injection 2.2 mg, ONCE PRN  midodrine (PROAMATINE) tablet 5 mg, PRN  albumin human 25 % IV solution 25 g, PRN  sodium citrate 4 % injection 1.9 mL, PRN  sodium citrate 4 % injection 1.9 mL, PRN  potassium chloride (KLOR-CON M) extended release tablet 40 mEq, PRN   Or  potassium bicarb-citric acid (EFFER-K) effervescent tablet 40 mEq, PRN   Or  potassium chloride 10 mEq/100 mL IVPB (Peripheral Line), PRN  sodium chloride flush 0.9 % injection 5-40 mL, 2 times per day  sodium chloride flush 0.9 % injection 5-40 mL, PRN  0.9 % sodium chloride infusion, PRN  acetaminophen (TYLENOL) tablet 650 mg, Q4H PRN  ondansetron (ZOFRAN-ODT) disintegrating tablet 4 mg, Q8H PRN   Or  ondansetron (ZOFRAN) injection 4 mg, Q6H PRN  acetaminophen (TYLENOL) tablet 650 mg, Q4H PRN  aspirin chewable tablet 81 mg, Daily  ranolazine (RANEXA) extended release tablet 1,000 mg, BID  busPIRone (BUSPAR) tablet 7.5 mg, TID  apixaban (ELIQUIS) tablet 5 mg, BID  gabapentin (NEURONTIN) capsule 300 mg, BID  insulin glargine (LANTUS) injection vial 53 Units, BID  atorvastatin (LIPITOR) tablet 80 mg, Daily  carvedilol (COREG) tablet 6.25 mg, BID  furosemide (LASIX) tablet 80 mg, BID  febuxostat (ULORIC) tablet 40 mg, Daily  ferrous sulfate (IRON 325) tablet 325 mg, BID WC  polyethylene glycol (GLYCOLAX) packet 17 g, Daily PRN  senna (SENOKOT) tablet 17.2 mg, Daily PRN  tamsulosin (FLOMAX) capsule 0.4 mg, Daily  pantoprazole (PROTONIX) tablet 40 mg, QAM AC  traZODone (DESYREL) tablet 75 mg, Nightly  insulin lispro (HUMALOG) injection vial 0-12 Units, TID WC  insulin lispro (HUMALOG) injection vial 0-6 Units, Nightly  glucose (GLUTOSE) 40 % oral gel 15 g, PRN  dextrose 50 % IV solution, PRN  glucagon (rDNA) injection 1 mg, PRN  dextrose 5 % solution, PRN      Objective:  Constitutional:    CURRENT TEMPERATURE:  Temp: 97.7 °F (36.5 °C)  MAXIMUM TEMPERATURE OVER 24HRS:  Temp (24hrs), Av.2 °F (36.2 °C), Min:95.6 °F (35.3 °C), Max:97.8 °F (36.6 °C)    CURRENT RESPIRATORY RATE:  Resp: 16  CURRENT PULSE:  Pulse: 58  CURRENT BLOOD PRESSURE:  BP: (!) 140/60  24HR BLOOD PRESSURE RANGE:  Systolic (03ERW), PBX:931 , Min:124 , KQX:015   ; Diastolic (06UID), BFC:08, Min:49, Max:60    24HR INTAKE/OUTPUT:      Intake/Output Summary (Last 24 hours) at 9/17/2021 1120  Last data filed at 9/16/2021 1808  Gross per 24 hour   Intake 1289 ml   Output --   Net 1289 ml     Physical Exam:  GENERAL APPEARANCE: Alert and cooperative, and appears to be in no acute distress. HEAD: normocephalic  NECK: Neck supple, non-tender without lymphadenopathy, masses or thyromegaly. JVD-neg  CARDIAC: Normal S1 and S2. No S3, S4 or murmurs. Rhythm is regular. LUNGS: Clear to auscultation and percussion without rales, rhonchi, wheezing or diminished breath sounds. ABDOMEN: Soft non distended, BS+ Non tender no organomegally  MUSKULOSKELETAL: Adequately aligned spine. No joint erythema or tenderness. EXTREMITIES:1+edema. Peripheral pulses intact. NEURO:Alert oriented x 3 , unable to move left lower and upper extremity. Labs:     CBC:   Recent Labs     09/15/21  0420 09/16/21  0423 09/17/21  0556   WBC 5.4 4.5 4.6   RBC 3.22* 3.29* 3.53*   HGB 10.4* 10.5* 11.1*   HCT 31.6* 31.5* 33.7*   MCV 97.9 95.7 95.6   MCH 32.3 32.1 31.6   MCHC 33.0 33.5 33.0   RDW 15.0* 15.0* 14.9   * 133* 143*   MPV 8.9 8.5 8.5      BMP:   Recent Labs     09/15/21  0420 09/16/21  0423 09/17/21  0556    138 142   K 4.3 3.7 4.2   * 103 105   CO2 21 27 28   BUN 29* 21 26*   CREATININE 2.73* 2.17* 2.64*   GLUCOSE 149* 173* 111*   CALCIUM 8.8 8.7 9.2      Albumin:   Recent Labs     09/15/21  0420 09/16/21  0423 09/17/21  0556   LABALBU 3.2* 3.2* 3.4*     Assessment/Plan:    1.   Acute kidney injury ,  secondary to ischemic acute tubular necrosis 2nd to IV contrast exposure-dialysis dependent with history of chronic kidney disease stage IV prior baseline creatinine 2.2 to 2.4 mg/dL- she is dialysis dependent [id number] by way of a right IJ tunneled

## 2021-09-17 NOTE — PLAN OF CARE
Problem: Falls - Risk of:  Goal: Will remain free from falls  Description: Will remain free from falls  9/17/2021 0358 by Jenifer Zhang RN  Outcome: Ongoing  Note: Pt remains free from falls at this time. Bed in low position. Call light within reach. Problem: Skin Integrity:  Goal: Absence of new skin breakdown  Description: Absence of new skin breakdown  9/17/2021 0358 by Jenifer Zhang RN  Outcome: Ongoing  Note: No new skin breakdown noted. Pt turns self in bed. Educated on side to side turning      Problem: COMMUNICATION IMPAIRMENT  Goal: Ability to express needs and understand communication  9/17/2021 0358 by Jenifer Zhang RN  Outcome: Ongoing  Note: Pt verbal with communicating needs.  Able to express needs known

## 2021-09-17 NOTE — PROGRESS NOTES
Speech Language Pathology  Spe[facility] Language Pathology  [facility]  Cognitive/Speech/Language  Treatment Note    Date: 9/17/2021  Patients Name: Ludwig Ovalle  MRN: [id number]  Diagnosis: Cognitive impairment   Patient Active Problem List   Diagnosis Code    Coronary artery disease I25.10    Chronic diastolic heart failure (HCC) I50.32    Diabetic polyneuropathy associated with type 2 diabetes mellitus (HCC) E11.42    History of coronary artery bypass graft Z95.1    Iron deficiency anemia D50.9    Spinal stenosis of lumbar region with neurogenic claudication M48.062    Mixed hyperlipidemia E78.2    Stage 4 chronic kidney disease (HCC) N18.4    Type 2 diabetes mellitus with kidney complication, with long-term current use of insulin (HCC) E11.29, Z79.4    Syncope and collapse R55    Obesity, Class II, BMI 35-39.9 E66.9    Thyroid nodule greater than or equal to 1 cm in diameter incidentally noted on imaging study E04.1    Essential hypertension I10    Anemia in stage 4 chronic kidney disease (HCC) N18.4, D63.1    Chronic ischemic heart disease I25.9    Acute cerebrovascular accident (CVA) ([facility] [address] I63.9    Stroke-like symptoms R29.90    Morbid obesity with BMI of 40.0-44.9, adult ([facility]) E66.01, Z68.41    Acute encephalopathy G93.40       Pain: 0/10    Cognitive Treatment    Treatment time:  1344-1676    Subjective: [x] Alert [x] Cooperative     [] Confused     [] Agitated    [] Lethargic    Objective/Assessment:  Attention: functional throughout    Orientation: n/a    Recall: Pt recalling writer and other ST that saw her yesterday along with specific tasks she had difficulty with. No family present in room. Pt actively using \"memory book\" in session to recall previous day's events/important information, etc.  Pt. Requesting assistance from ST in recalling specific details to add to her book. Pt ed provided re use of external memory aides to facilitate recall.  Pt verbalized understanding of all recommendations. Other: Word finding:   Generative namin members of concrete category (no time constraint) 100%. Opposittes- 88%, 100% c cues. Dual meanings- 80%, 90% c cues. Pt. Demonstrated very long response latency across all tasks. Plan:  [x] Continue ST services    [] Discharge from ST:      Discharge recommendations: [] Inpatient Rehab   [] [place]   [] Outpatient Therapy  [] Follow up at trauma clinic   [] Other:       Treatment completed by: Sophia Johnson A.CCC/SLP

## 2021-09-17 NOTE — PROGRESS NOTES
HEMODIALYSIS POST TREATMENT NOTE    Treatment time ordered: 3Hrs    Actual treatment time: 3Hrs    UltraFiltration Goal: 2Kgs  UltraFiltration Removed: 1.5Kgs      Pre Treatment weight: 109.6Kgs  Post Treatment weight: 108Kgs  Estimated Dry Weight: 106.2    Access used:     Central Venous Catheter:          Tunneled or Non-tunneled: Tunneled           Site: R Chest          Access Flow: Good      Internal Access:       AV Fistula or AV Graft: N/A         Site: N/A       Access Flow: N/A       Sign and symptoms of infection: No       If YES: N/A    Medications or blood products given: Albumin 25gx1    Chronic outpatient schedule: [facility]    Chronic outpatient unit: [facility]    Summary of response to treatment: Tolerated fairly well with low B/P throughout trx, with Midodrine given by floor RN and Albumin given by dialysis RN Junaid Harrell. Tolerated 1.5Kgs removal    Explain if orders NOT met, was physician notified:Yes      ACES flowsheet faxed to patient unit/ placed in patient chart: yes     Post assessment completed: yes by Junaid Harrell RN    Report given to: Ana Laura Magaña documented Safety Checks include the followin) Access and face visible at all times. 2) All connections and blood lines are secure with no kinks. 3) NVL alarm engaged. 4) Hemosafe device applied (if applicable). 5) No collapse of Arterial or Venous blood chambers. 6) All blood lines / pump segments in the air detectors.

## 2021-09-17 NOTE — PROGRESS NOTES
Pulmonary Progress Note  [facility]      Patient - Robert Frost,  Age - 59 y.o.    - 1960      Room Number - 2122/2122-01   N -  [id number]   [facility]  Date of Admission -  2021  2:06 PM        Consulting Jenifer Ellis MD  Primary Care Physician - Daria Meneses, APRN - CNP     SUBJECTIVE   She is sitting up but not using her incentive spirometry still on 2 L nasal cannula    OBJECTIVE   VITALS    height is 5' 5\" (1.651 m) and weight is 238 lb 1.6 oz (108 kg). Her oral temperature is 97.7 °F (36.5 °C). Her blood pressure is 123/49 (abnormal) and her pulse is 55. Her respiration is 14 and oxygen saturation is 95%. Body mass index is 39.62 kg/m². Temperature Range: Temp: 97.7 °F (36.5 °C) Temp  Av.3 °F (36.3 °C)  Min: 95.6 °F (35.3 °C)  Max: 97.7 °F (36.5 °C)  BP Range:  Systolic (85PGS), FVU:711 , Min:103 , AFB:672     Diastolic (47UMK), LXD:96, Min:43, Max:63    Pulse Range: Pulse  Av.3  Min: 52  Max: 63  Respiration Range: Resp  Avg: 15.3  Min: 14  Max: 16  Current Pulse Ox[de-identified]  SpO2: 95 %  24HR Pulse Ox Range:  SpO2  Av.5 %  Min: 95 %  Max: 96 %  Oxygen Amount and Delivery: O2 Flow Rate (L/min): 2 L/min    Wt Readings from Last 3 Encounters:   21 238 lb 1.6 oz (108 kg)   21 236 lb (107 kg)   21 233 lb 11 oz (106 kg)       I/O (24 Hours)    Intake/Output Summary (Last 24 hours) at 2021 1434  Last data filed at 2021 1808  Gross per 24 hour   Intake 1109 ml   Output --   Net 1109 ml       EXAM     General Appearance  Awake, alert, oriented, in no acute distress  HEENT - normocephalic, atraumatic.  []  Mallampati  [x] Crowded airway   [x] Macroglossia  []  Retrognathia  [] Micrognathia  []  Normal tongue size []  Normal Bite  [] [place] sign positive    Neck - Supple,  trachea midline   Lungs -diminished  Cardiovascular - Heart sounds are normal. Regular rate and rhythm   Abdomen - Soft, nontender, nondistended, no masses or organomegaly  Neurologic - There are no focal motor or sensory deficits  Skin - No bruising or bleeding  Extremities - No clubbing, cyanosis, +edema    MEDS      sodium chloride flush  5-40 mL IntraVENous 2 times per day    aspirin  81 mg Oral Daily    ranolazine  1,000 mg Oral BID    busPIRone  7.5 mg Oral TID    apixaban  5 mg Oral BID    gabapentin  300 mg Oral BID    insulin glargine  53 Units SubCUTAneous BID    atorvastatin  80 mg Oral Daily    carvedilol  6.25 mg Oral BID    furosemide  80 mg Oral BID    febuxostat  40 mg Oral Daily    ferrous sulfate  325 mg Oral BID WC    tamsulosin  0.4 mg Oral Daily    pantoprazole  40 mg Oral QAM AC    traZODone  75 mg Oral Nightly    insulin lispro  0-12 Units SubCUTAneous TID WC    insulin lispro  0-6 Units SubCUTAneous Nightly      sodium chloride      dextrose       perflutren lipid microspheres, albumin human, sodium citrate, sodium citrate, potassium chloride **OR** potassium alternative oral replacement **OR** potassium chloride, sodium chloride flush, sodium chloride, acetaminophen, ondansetron **OR** ondansetron, acetaminophen, polyethylene glycol, senna, glucose, dextrose, glucagon (rDNA), dextrose    LABS   CBC   Recent Labs     09/17/21  0556   WBC 4.6   HGB 11.1*   HCT 33.7*   MCV 95.6   *     BMP:   Lab Results   Component Value Date     09/17/2021    K 4.2 09/17/2021     09/17/2021    CO2 28 09/17/2021    BUN 26 09/17/2021    LABALBU 3.4 09/17/2021    CREATININE 2.64 09/17/2021    CALCIUM 9.2 09/17/2021    GFRAA 22 09/17/2021    LABGLOM 18 09/17/2021     ABGs:No results found for: PHART, PO2ART, LUH1VHJ   Lab Results   Component Value Date    MODE CONDITION NO LONGER WARRANTS 04/06/2021     Ionized Calcium:  No results found for: IONCA  Magnesium:    Lab Results   Component Value Date    MG 2.0 07/16/2021     Phosphorus:    Lab Results Component Value Date    PHOS 2.5 09/15/2021        LIVER PROFILE   Recent Labs     09/17/21  0556   AST 16   ALT 13   BILITOT 0.40   ALKPHOS 98     INR No results for input(s): INR in the last 72 hours. PTT   Lab Results   Component Value Date    APTT 42.4 (H) 08/16/2021         RADIOLOGY         ASSESSMENT/PLAN   Principal Problem:    Stroke-like symptoms  Active Problems:    Coronary artery disease    Chronic diastolic heart failure (HCC)    Diabetic polyneuropathy associated with type [facility] diabetes mellitus [facility]    Mixed hyperlipidemia    Type 2 diabetes mellitus with kidney complication, with long-term current use of insulin (HCC)    Essential hypertension    Anemia in stage 4 chronic kidney disease (HCC)    Chronic ischemic heart disease    Morbid obesity with BMI of 40.0-44.9, adult ([facility]    Acute encephalopathy  Resolved Problems:    * No resolved hospital problems.  *    Her x-ray is consistent with small pleural effusions and atelectasis with vascular congestion, indicating to me her fluid overload status which may explain her daytime hypoxemia with exertion  No objection to acute rehab  She will need home oxygen evaluation including with exertion prior to discharge  Outpatient sleep study  Can follow-up with me in 2 to 3-month    Electronically signed by Leeann Pillai MD on 9/17/2021 at 2:34 PM

## 2021-09-17 NOTE — PROGRESS NOTES
[address]   OCCUPATIONAL THERAPY MISSED TREATMENT NOTE   INPATIENT   Date: 21  Patient Name: Ranjith Hill       Room: 7221/2014-64  MRN: [id number]   Account #: [de-identified]    : 1960  (59 y.o.)  Gender: female   Referring Practitioner: Thao Louie MD  Diagnosis: Acute encephalopathy             REASON FOR MISSED TREATMENT:   BLE dopplers ordered. Occupational therapy to hold treatment until results are posted. Will continue to follow.         Milan Batres OT

## 2021-09-17 NOTE — CARE COORDINATION
BONNIE received a call from Diane in admissions with the ARU. She reported that this patient's insurance did approve her to admit to their unit. Ron still needs to complete the paperwork necessary for the ARU. However,, prior to her admitting, the DC/readmit will need to be completed, anticoagulation continued or a reason why not and dopplers. Jennifer did say that dopplers had been ordered and therefore they would need at least the preliminary report. After this is complete the RN can call report to [phone] and at that time discuss the time for transfer. BONNIE did inform Aye Mcdonald RN Clinical lead of this information.

## 2021-09-17 NOTE — PROGRESS NOTES
Physician Progress Note      PATIENT:               Myra Barrera  CSN #:                  [id number]  :                       1960  ADMIT DATE:       2021 2:06 PM  [address] [place] DATE:  RESPONDING  PROVIDER #:        Joaquin Ding MD          QUERY TEXT:    Patient admitted with hypotension during dialysis. Noted documentation of   Acute Kidney Injury superimposed on CKD stage 4 recently requiring   hemodialysis in Nephrology progress note on . In order to support the   diagnosis of AURELIA, please include additional clinical indicators in your   documentation. Or please document if the diagnosis of AURELIA has been ruled out   after further study    The medical record reflects the following:  Risk Factors: 59 y.o. female with past medical history of longstanding type 2   diabetes (insulin-dependent) mellitus, essential hypertension, coronary artery   disease status post CABG, 59 y.o. female with past medical history of   longstanding type 2 diabetes insulin-dependent diabetes mellitus, essential   hypertension, coronary artery disease status post CABG, previously being   treated for CKD stage 4. Pt had CT angiogram in early August for a stroke and   required dialysis after receiving IV contrast.  Clinical Indicators: CKD 4 with baseline creatinine of 2.2 to 2.4 mg/dL,    Nephrology consult states, Acute kidney injury superimposed on chronic kidney   disease stage IV differentials secondary to ischemic ATN. Per documentation,   pt noted to have urine output. Treatment: Renal diet, fluid restriction, hemodialysis 3x/ week, monitoring of   BMP w/ GFR, continuation of Lasix 80mg daily.     Defined by Kidney Disease Improving Global Outcomes (KDIGO) clinical practice   guideline for acute kidney injury:  -Increase in SCr by greater than or equal to 0.3 mg/dl within 48 hours; or  -Increase or decrease in SCr to greater than or equal to 1.5 times baseline,   which is known or presumed to have occurred within the prior 7 days; or  -Urine volume < 0.5ml/kg/h for 6 hours  Options provided:  -- Acute kidney injury superimposed on CKD stage 4 evidenced by, Please   document evidence as well as baseline creatinine, if known.   -- Acute kidney injury superimposed on CKD stage 4 ruled out after study  -- Other - I will add my own diagnosis  -- Disagree - Not applicable / Not valid  -- Disagree - Clinically unable to determine / Unknown  -- Refer to Clinical Documentation Reviewer    PROVIDER RESPONSE TEXT:    Chronic kidney disease stage IV    Query created by: Lyudmila Coffey on 9/17/2021 6:55 AM      Electronically signed by:  Lisa Costello MD 9/17/2021 9:37 AM

## 2021-09-17 NOTE — PROGRESS NOTES
Physician Progress Note      PATIENT:               Tracee Ayoub  CSN #:                  [id number]  :                       1960  ADMIT DATE:       2021 2:06 PM  [address] [place] DATE:  RESPONDING  PROVIDER #:        Tim Alcantar MD          QUERY TEXT:    Patient admitted with hypotension during dialysis. Noted documentation of   Acute Kidney Injury superimposed on CKD stage 4 recently requiring   hemodialysis in Nephrology progress note on  and Acute kidney injury   superimposed on chronic kidney disease stage IV differentials secondary to   ischemic ATN in  Nephrology consult note   In order to support the   diagnosis of AURELIA, please include additional clinical indicators in your   documentation. Or please document if the diagnosis of AURELIA has been ruled out   after further study    The medical record reflects the following:  Risk Factors: 59 y.o. female with past medical history of longstanding type 2   diabetes (insulin-dependent) mellitus, essential hypertension, coronary artery   disease status post CABG, 59 y.o. female with past medical history of   longstanding type 2 diabetes insulin-dependent diabetes mellitus, essential   hypertension, coronary artery disease status post CABG, previously being   treated for CKD stage 4. Pt had CT angiogram in early August for a stroke and   required dialysis after receiving IV contrast.  Clinical Indicators: CKD 4 with baseline creatinine of 2.2 to 2.4 mg/dL,    Nephrology consult states, Acute kidney injury superimposed on chronic kidney   disease stage IV differentials secondary to ischemic ATN. Per documentation,   pt noted to have urine output. Treatment: Renal diet, fluid restriction, hemodialysis 3x/ week, monitoring of   BMP w/ GFR, continuation of Lasix 80mg daily.     Defined by Kidney Disease Improving Global Outcomes (KDIGO) clinical practice   guideline for acute kidney injury:  -Increase in SCr by greater than or equal to 0.3 mg/dl within 48 hours; or  -Increase or decrease in SCr to greater than or equal to 1.5 times baseline,   which is known or presumed to have occurred within the prior 7 days; or  -Urine volume < 0.5ml/kg/h for 6 hours  Options provided:  -- Ischemic ATN evidenced by, Please document evidence as well as baseline   creatinine, if known. -- Ischemic ATN ruled out after study  -- Other - I will add my own diagnosis  -- Disagree - Not applicable / Not valid  -- Disagree - Clinically unable to determine / Unknown  -- Refer to Clinical Documentation Reviewer    PROVIDER RESPONSE TEXT:    Provider disagreed with this query.   CKD $    Query created by: Uyen De La Torre on 9/17/2021 6:58 AM      Electronically signed by:  Joaquin Ding MD 9/17/2021 9:47 AM

## 2021-09-17 NOTE — PROGRESS NOTES
Needs assistance (Pt. uses motorized cart at the grocery store, sister does grocery shopping typically. , pt preparing meals for herself and her mother)  Ambulation Assistance: Independent (using wheeled walker since D/C, limited distances due to back & neck pain)  Transfer Assistance: Independent  Additional Comments: Pt recently discharged from Rehab at [facility] on 8- w/ home health was set up for PT, OT and Speech. Pt was going to dialysis 3 X/ week. .  Pt is a confused- above information was taken from PT evaluation dated 8-. Pt.'s mother becoming less mobile, pt.'s sister helps out mother. Pt. reports that 2 sisters can assist. Pt.s father in [facility] home. Pt reports that her 2 sisters live close by and that her one sister is retired and able to assist as needed. History of current illness, per PM&R Consult:  Ridge Mckinney is a 59 y.o. right-handed female with history of recent CVA, AURELIA on CKD (on hemodialysis), CAD, CHF, atrial fibrillation, HTN, type 2 diabetes admitted to [facility] on 9/13/2021.       She initially presented following a syncopal episode in dialysis, after which she was reportedly confused. She has reported right lower limb weakness and heaviness. Imaging of the brain has been unremarkable. EEG showed mild encephalopathy with no epileptiform activity. Symptoms are thought to be secondary to hypoperfusion. Pulmonology has been consulted for new oxygen requirement overnight.     She is known to our service from acute rehab admission following recent CVA.     She currently reports ongoing impaired memory and right-sided numbness/tingling and weakness. She thinks that symptoms are improving a little bit in the right upper limb. She also reports intermittent headache and dizziness. She notes shortness of breath with activity and decreased urine output as well.     Prognosis: Fair    Current functional status for upper extremity ADLs:  UE Bathing: Minimal assistance  UE Dressing: Minimal assistance    Current functional status for lower extremity ADLs:  LE Bathing: Minimal assistance  LE Dressing: Minimal assistance    Current functional status for bed, chair, wheelchair transfers:  Transfers  Sit to Stand: Supervision  Stand to sit: Supervision  Bed to Chair: Supervision  Stand Pivot Transfers: Supervision  Comment: pt stood next to the bed for less than 1 minute- pt's right knee buckling w/ standing- Pt fearful of falling and C/O weakness and heaviness of right LE    Current functional status for toilet transfers: Toilet Transfers  Toilet - Technique: Ambulating  Equipment Used: Grab bars  Toilet Transfer: Contact guard assistance  Toilet Transfers Comments: Verbal cues for hand placement and safety    Current functional status for locomotion:  Ambulation  Ambulation?: Yes  Ambulation 1  Surface: level tile  Device: Rolling Walker  Assistance: Stand by assistance  Quality of Gait: slight foward flexed posture with short step lengthb   Gait Deviations: Slow Annie, Increased ELISABET, Decreased step length, Decreased step height  Distance: 10ft from bed to toilet; 20ft from toilet to bedside chair in AM; 47ft in PM   Comments: patient less fearful this PM. Able to amb. distanvce with no buckeling of R knee     Current functional status for comprehension: Complete independence    Current functional status for expression: Minimal contact assistance    Current functional status for social interaction: Complete independence    Current functional status for problem solving: Minimal contact assistance    Current functional status for memory:  Moderate assistance    Current Deficits R/T Impairment: Impaired Functional Mobility and Decreased ADLs    Required Therapy:   [x] Physical Therapy  [x] Occupational Therapy   [x] Speech Therapy, as appropriate    Additional Services:    [x]     [] Recreational Therapy, as appropriate    [x] Nutrition [x] Dialysis  [] Cultural Needs Identified  [] Special Equipment Needs  [x] Other:  BLE Lymphedema Wraps    Rehab Justification:  Needs 3 hrs therapy per day or 15 hours per week:  Yes  Identified Rehab Nursing needs: Yes  Intense Interdisciplinary need:  Yes  Need for 24 hr physician supervision:  Yes  Measurable improved quality of life:  Yes  Willingness to participate:  Yes  Medical Necessity:  Yes  Patient able to tolerate care proposed:  Yes    Expected Discharge Destination/Functional Level:  Home with assist  Expected length of time to achieve that level of improvement: 1-2 weeks  Expected Post Discharge Treatments: Home with possible Home Care    Other information relevant to patient's care needs:  N/A    Acute Inpatient Rehabilitation Disclosure Statement will be provided to patient upon admission to ARU with patient's verbalization of understanding. I have reviewed and concur with the findings and results of the pre-admission screening assessment completed by the Inpatient Rehabilitation Admissions Coordinator.

## 2021-09-17 NOTE — PROGRESS NOTES
[place]  Speech Language Pathology    Date: 9/17/2021  Patient Name: Miguel Angel Gunter  YOB: 1960   AGE: 59 y.o. MRN: [id number]        Patient Not Available for Speech Therapy     Due to:  [] Testing  [] Hemodialysis  [] Cancelled by RN  [] Surgery   [] Intubation/Sedation/Pain Medication  [] Medical instability  [x] Other:  Attempted 3314- pt. OOR for testing. 1000- HD    Next scheduled treatment: as able    Completed by: Jennifer Manning A.CCC/SLP

## 2021-09-17 NOTE — PROGRESS NOTES
Physical Therapy  DATE: 2021    NAME: Raine Bueno  MRN: [id number]   : 1960    Patient not seen this date for Physical Therapy due to:  [] Blood transfusion in progress  [] Cancel by RN  [] Hemodialysis  []  Refusal by Patient   [] Spine Precautions   [] Strict Bedrest  [] Surgery  [x] Testing; checked in with DEDE Patterson @1569, stated patient is down getting B LE dopplers. Will hold treatment until results are posted. Will continue to follow. [] Other        [] PT being discontinued at this time. Patient independent. No further needs. [] PT being discontinued at this time as the patient has been transferred to hospice care. No further needs.     Electronically signed by Yolette Ingram PTA on 21 at 9:16 AM EDT

## 2021-09-18 LAB
ANION GAP SERPL CALCULATED.3IONS-SCNC: 9 MMOL/L (ref 9–17)
BUN BLDV-MCNC: 20 MG/DL (ref 8–23)
BUN/CREAT BLD: ABNORMAL (ref 9–20)
CALCIUM SERPL-MCNC: 9.6 MG/DL (ref 8.6–10.4)
CHLORIDE BLD-SCNC: 101 MMOL/L (ref 98–107)
CO2: 28 MMOL/L (ref 20–31)
CREAT SERPL-MCNC: 2.23 MG/DL (ref 0.5–0.9)
GFR AFRICAN AMERICAN: 27 ML/MIN
GFR NON-AFRICAN AMERICAN: 22 ML/MIN
GFR SERPL CREATININE-BSD FRML MDRD: ABNORMAL ML/MIN/{1.73_M2}
GFR SERPL CREATININE-BSD FRML MDRD: ABNORMAL ML/MIN/{1.73_M2}
GLUCOSE BLD-MCNC: 101 MG/DL (ref 70–99)
GLUCOSE BLD-MCNC: 110 MG/DL (ref 65–105)
GLUCOSE BLD-MCNC: 234 MG/DL (ref 65–105)
GLUCOSE BLD-MCNC: 274 MG/DL (ref 65–105)
GLUCOSE BLD-MCNC: 336 MG/DL (ref 65–105)
HCT VFR BLD CALC: 33.5 % (ref 36–46)
HEMOGLOBIN: 11.3 G/DL (ref 12–16)
MCH RBC QN AUTO: 32.2 PG (ref 26–34)
MCHC RBC AUTO-ENTMCNC: 33.8 G/DL (ref 31–37)
MCV RBC AUTO: 95.3 FL (ref 80–100)
NRBC AUTOMATED: ABNORMAL PER 100 WBC
PDW BLD-RTO: 14.7 % (ref 11.5–14.9)
PLATELET # BLD: 140 K/UL (ref 150–450)
PMV BLD AUTO: 8.7 FL (ref 6–12)
POTASSIUM SERPL-SCNC: 4.3 MMOL/L (ref 3.7–5.3)
RBC # BLD: 3.52 M/UL (ref 4–5.2)
SODIUM BLD-SCNC: 138 MMOL/L (ref 135–144)
WBC # BLD: 4.5 K/UL (ref 3.5–11)

## 2021-09-18 PROCEDURE — 80048 BASIC METABOLIC PNL TOTAL CA: CPT

## 2021-09-18 PROCEDURE — 92523 SPEECH SOUND LANG COMPREHEN: CPT

## 2021-09-18 PROCEDURE — 6370000000 HC RX 637 (ALT 250 FOR IP): Performed by: PHYSICAL MEDICINE & REHABILITATION

## 2021-09-18 PROCEDURE — 82947 ASSAY GLUCOSE BLOOD QUANT: CPT

## 2021-09-18 PROCEDURE — 97535 SELF CARE MNGMENT TRAINING: CPT

## 2021-09-18 PROCEDURE — 97130 THER IVNTJ EA ADDL 15 MIN: CPT

## 2021-09-18 PROCEDURE — 97116 GAIT TRAINING THERAPY: CPT

## 2021-09-18 PROCEDURE — 1180000000 HC REHAB R&B

## 2021-09-18 PROCEDURE — 97165 OT EVAL LOW COMPLEX 30 MIN: CPT

## 2021-09-18 PROCEDURE — 36415 COLL VENOUS BLD VENIPUNCTURE: CPT

## 2021-09-18 PROCEDURE — 85027 COMPLETE CBC AUTOMATED: CPT

## 2021-09-18 PROCEDURE — 6370000000 HC RX 637 (ALT 250 FOR IP): Performed by: FAMILY MEDICINE

## 2021-09-18 PROCEDURE — 97530 THERAPEUTIC ACTIVITIES: CPT

## 2021-09-18 PROCEDURE — 97162 PT EVAL MOD COMPLEX 30 MIN: CPT

## 2021-09-18 PROCEDURE — 92507 TX SP LANG VOICE COMM INDIV: CPT

## 2021-09-18 PROCEDURE — 97129 THER IVNTJ 1ST 15 MIN: CPT

## 2021-09-18 PROCEDURE — 97110 THERAPEUTIC EXERCISES: CPT

## 2021-09-18 RX ADMIN — TRAZODONE HYDROCHLORIDE 75 MG: 150 TABLET ORAL at 20:39

## 2021-09-18 RX ADMIN — INSULIN LISPRO 6 UNITS: 100 INJECTION, SOLUTION INTRAVENOUS; SUBCUTANEOUS at 16:27

## 2021-09-18 RX ADMIN — POLYETHYLENE GLYCOL 3350 17 G: 17 POWDER, FOR SOLUTION ORAL at 09:29

## 2021-09-18 RX ADMIN — ATORVASTATIN CALCIUM 80 MG: 80 TABLET, FILM COATED ORAL at 08:02

## 2021-09-18 RX ADMIN — RANOLAZINE 1000 MG: 1000 TABLET, FILM COATED, EXTENDED RELEASE ORAL at 07:59

## 2021-09-18 RX ADMIN — RANOLAZINE 1000 MG: 1000 TABLET, FILM COATED, EXTENDED RELEASE ORAL at 20:49

## 2021-09-18 RX ADMIN — BUSPIRONE HYDROCHLORIDE 7.5 MG: 7.5 TABLET ORAL at 20:48

## 2021-09-18 RX ADMIN — BUSPIRONE HYDROCHLORIDE 7.5 MG: 7.5 TABLET ORAL at 08:03

## 2021-09-18 RX ADMIN — APIXABAN 5 MG: 5 TABLET, FILM COATED ORAL at 08:04

## 2021-09-18 RX ADMIN — GABAPENTIN 300 MG: 300 CAPSULE ORAL at 08:00

## 2021-09-18 RX ADMIN — INSULIN GLARGINE 53 UNITS: 100 INJECTION, SOLUTION SUBCUTANEOUS at 11:19

## 2021-09-18 RX ADMIN — TAMSULOSIN HYDROCHLORIDE 0.4 MG: 0.4 CAPSULE ORAL at 08:00

## 2021-09-18 RX ADMIN — FUROSEMIDE 80 MG: 40 TABLET ORAL at 16:27

## 2021-09-18 RX ADMIN — ASPIRIN 81 MG CHEWABLE TABLET 81 MG: 81 TABLET CHEWABLE at 08:03

## 2021-09-18 RX ADMIN — INSULIN GLARGINE 53 UNITS: 100 INJECTION, SOLUTION SUBCUTANEOUS at 20:41

## 2021-09-18 RX ADMIN — CARVEDILOL 6.25 MG: 6.25 TABLET, FILM COATED ORAL at 08:01

## 2021-09-18 RX ADMIN — INSULIN LISPRO 2 UNITS: 100 INJECTION, SOLUTION INTRAVENOUS; SUBCUTANEOUS at 20:40

## 2021-09-18 RX ADMIN — BUSPIRONE HYDROCHLORIDE 7.5 MG: 7.5 TABLET ORAL at 13:00

## 2021-09-18 RX ADMIN — PANTOPRAZOLE SODIUM 40 MG: 40 TABLET, DELAYED RELEASE ORAL at 06:14

## 2021-09-18 RX ADMIN — FUROSEMIDE 80 MG: 40 TABLET ORAL at 08:01

## 2021-09-18 RX ADMIN — CARVEDILOL 6.25 MG: 6.25 TABLET, FILM COATED ORAL at 20:39

## 2021-09-18 RX ADMIN — GABAPENTIN 300 MG: 300 CAPSULE ORAL at 20:40

## 2021-09-18 RX ADMIN — SENNOSIDES 17.2 MG: 8.6 TABLET, COATED ORAL at 09:29

## 2021-09-18 RX ADMIN — INSULIN LISPRO 8 UNITS: 100 INJECTION, SOLUTION INTRAVENOUS; SUBCUTANEOUS at 11:20

## 2021-09-18 RX ADMIN — APIXABAN 5 MG: 5 TABLET, FILM COATED ORAL at 20:40

## 2021-09-18 RX ADMIN — FEBUXOSTAT 40 MG: 40 TABLET, FILM COATED ORAL at 08:02

## 2021-09-18 ASSESSMENT — PAIN SCALES - GENERAL
PAINLEVEL_OUTOF10: 0
PAINLEVEL_OUTOF10: 6
PAINLEVEL_OUTOF10: 0

## 2021-09-18 ASSESSMENT — 9 HOLE PEG TEST
TESTTIME_SECONDS: 32
TESTTIME_SECONDS: 29

## 2021-09-18 ASSESSMENT — PAIN DESCRIPTION - LOCATION: LOCATION: GENERALIZED

## 2021-09-18 ASSESSMENT — PAIN DESCRIPTION - DESCRIPTORS: DESCRIPTORS: ACHING

## 2021-09-18 ASSESSMENT — PAIN DESCRIPTION - FREQUENCY: FREQUENCY: INTERMITTENT

## 2021-09-18 ASSESSMENT — PAIN DESCRIPTION - PROGRESSION
CLINICAL_PROGRESSION: NOT CHANGED
CLINICAL_PROGRESSION: NOT CHANGED

## 2021-09-18 ASSESSMENT — PAIN DESCRIPTION - PAIN TYPE: TYPE: ACUTE PAIN

## 2021-09-18 NOTE — PROGRESS NOTES
[facility]   Acute Rehabilitation OT Evaluation  Date: 21  Patient Name: Jonn Marsh       Room: 6859/6218-93  MRN: [id number]  Account: [de-identified]   : 1960  (59 y.o.) Gender: female        Diagnosis: Acute encephalopathy, thought to be secondary to hypoperfusion Recrudescence of right-sided stroke symptoms, admit post syncope event during dialysis with AURELIA on CKI. Additional Pertinent Hx: d/c from  acute rehab here on 21 post treat for CVA with R weak. BLE lymphedema -pt notes per GP not to use pumps from home currently. pt to bring in comp garments and brandon. Per PM&R note: Jonn Marsh is a 59 y.o. right-handed female with history of recent CVA, AURELIA on CKD (on hemodialysis), CAD, CHF, atrial fibrillation, HTN, type 2 diabetes [place] [facility] [place]. She initially presented following a syncopal episode in dialysis, after which she was reportedly confused. She has reported right lower limb weakness and heaviness. Imaging of the brain has been unremarkable. EEG showed mild encephalopathy with no epileptiform activity. Symptoms are thought to be secondary to hypoperfusion. Pulmonology has been consulted for new oxygen requirement overnight. She currently reports ongoing impaired memory and right-sided numbness/tingling and weakness.     Treatment Diagnosis: Impaired self care status due to decreased work ilana, balance, RUE motor post recent CVA followed by encephalopathy   Past Medical History:  has a past medical history of Backache, unspecified, CHF (congestive heart failure) ([facility] [address] Chronic kidney disease, Coronary atherosclerosis of artery bypass graft, Cramp of limb, Gallstones, Hypertension, Insomnia, Pneumonia, Type II or unspecified type diabetes mellitus with renal manifestations, not stated as uncontrolled(250.40), Type II or unspecified type diabetes mellitus without mention of complication, not stated as uncontrolled, and Unspecified at home)  ADL Assistance: Independent  Homemaking Assistance: Needs assistance (Pt. uses motorized cart at the grocery store, sister does grocery shopping typically. 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 sleeps in flat bed at home,  BLE lymphedema -pt notes per GP not to use pumps from home currently. pt had returned to [facility] don compression garments with brandon. Additional Comments: Pt recently discharged from Rehab at [facility] on 8- w/ home health was set up for PT, OT and Speech. Pt was going to dialysis 3 X/ week. .  Pt is a confused/memory impairement. Pt.'s mother becoming less mobile, pt.'s sister helps out mother. Pt. reports that 2 sisters can assist. Pt.s father in [facility] home. Pt reports that her 2 sisters live close by and that her one sister is retired and able to assist as needed. Objective      Cognition  Attention Span: Difficulty dividing attention  Memory: Decreased long term memory  Following Commands: Follows one step commands consistently  Safety Judgement: Good awareness of safety precautions  Insights: Fully aware of deficits  Cognition  Cognition Comment: per ST pt with fair short term memory but having LT memory deficits ie. asks \"What is a flashlight? \"  some word finding deficits. pt indep initiates writing new info into journal today to assist with her memory.    Sensation  Overall Sensation Status: Impaired (numb B feet, calves, B hands due to diabetic neuropathy)     UE Function  Hand Dominance  Hand Dominance: Right        LUE Strength  Gross LUE Strength: WFL  Left Hand Strength -  (lbs)  Handle Setting 2: 35  LUE Tone: Normotonic     LUE AROM (degrees)  LUE AROM : WFL        RUE Strength  Gross RUE Strength: Exceptions to [facility]  R Shoulder Flex: 3-/5  R Elbow Flex: 3+/5  R Elbow Ext: 4-/5  R Wrist Flex: 3/5  R Wrist Ext: 3+/5   Right Hand Strength -  (lbs)  Handle Setting 2: 15  RUE Tone: Normotonic     RUE AROM (degrees)  RUE AROM : Exceptions  R Shoulder Flexion 0-180: 0-80 AROM, 0-110 PROM  R Forearm Supination  0-90: pt able to achieve full AROM but only after tactile cue- pt can self provide. Left 9 Hole Peg Test Time (secs): 29  Right 9 Hole Peg Test Time (secs): 32         Fine Motor Skills  Coordination  Movements Are Fluid And Coordinated: No  Coordination and Movement description: Fine motor impairments, Gross motor impairments, Right UE, Decreased speed, Decreased accuracy   Comment: pt describes poor coordination to cut food and needing to feed self with nondominant LUE   Left Hand Strength - Pinch (lbs)  Lateral: 8  Palmar 3 point: 6     Right Hand Strength - Pinch (lbs)  Lateral: 4  Palmar 3 point: 4     Quality of Movement Other  Comment: pt describes poor coordination to cut food and needing to feed self with nondominant LUE       Mobility          Balance  Sitting Balance: Independent (on tub bench)  Standing Balance: Contact guard assistance  Standing Balance  Time: 5 min , 3 min 1st am treat, 6 min x 2, 4 min x 2 and 2 min x 2 2nd am treat  Activity: adls with walker  Functional Mobility  Functional - Mobility Device: Rolling Walker  Activity: To/from bathroom  Assist Level: Contact guard assistance  Functional Mobility Comments: ambulate 13 feet to bathroom, then stand step transfers due to fatigue.         Transfers  Stand Step Transfers: Contact guard assistance  Stand Pivot Transfers: Contact guard assistance  Sit to stand: Contact guard assistance  Stand to sit: Contact guard assistance  Transfer Comments: RW      Activity Tolerance: Patient limited by fatigue  Activity Tolerance: pt on room air with sats at 90.  yesterday pt on 1 L. ADL     ADL  Feeding: Setup (pt describes poor coordination to cut food and needing to feed self with nondominant LUE)  Grooming: Setup  UE Bathing: Setup  LE Bathing: Moderate assistance (assist to wash B feet and bottom)  UE Dressing: Setup (t shirt)  LE Dressing: Moderate assistance (min assist pants, TA teds, min socks with sock aide (has at home))  Toileting: Minimal assistance (post BM to wipe bottom)  Additional Comments: pt completed toileting and brush teeth 1st treatment and shower and dress 2nd adl treatment. pt is unable to reach B feet for adls and most recently was using equipment at home for this. BLE with mod edema worse on L. per RN ok to don tigre hose, tigre hose were donned.   OT scores     Eating  Assistance Needed: Setup or clean-up assistance  Comment: using non dominant LUE  CARE Score: 5  Discharge Goal: Independent  Oral Hygiene  Assistance Needed: Setup or clean-up assistance  CARE Score: 5  Discharge Goal: Independent  Toileting Hygiene  Assistance Needed: Partial/moderate assistance  CARE Score: 3  Discharge Goal: Independent  Shower/Bathe Self  Assistance Needed: Partial/moderate assistance  CARE Score: 3  Discharge Goal: Independent  Upper Body Dressing  Assistance Needed: Setup or clean-up assistance  CARE Score: 5  Discharge Goal: Independent  Lower Body Dressing  Assistance Needed: Partial/moderate assistance  CARE Score: 3  Discharge Goal: Independent  Putting On/Taking Off Footwear  Assistance Needed: Partial/moderate assistance  CARE Score: 3  Discharge Goal: Independent  Toilet Transfer  Assistance Needed: Supervision or touching assistance  CARE Score: 4  Discharge Goal: Independent  Goals  Patient Goals   Patient goals : return home safe and indep with self care  Short term goals  Time Frame for Short term goals: 1 week  Short term goal 1: pt to use BUE to cut food successfully and feed self with RUE mod indep. Short term goal 2: SBA ambulate to obtain set up for adls. Short term goal 3: SBA LE bathe and dress, able to don compression stockings with own device with min assist  Short term goal 4: ilana 9-11 min stand, ambulate with SBA and good safety for adls  Short term goal 5: ilana 10 reps x 3 R shld AROM achieving 95 degrees, ilana 10 reps x 3 R hand  strengthening. Long term goals  Time Frame for Long term goals : by discharge  Long term goal 1: mod indep ambulate to obtain set up for adls (4 [employer])  Long term goal 2: mod indep self care and toileting  Long term goal 3: tolerate 15-17 min stand, ambulate for adls mod indep with good safety. Long term goal 4: indep RUE HEP  Long term goal 5: increase R shoulder AROM to 100 and MMT to 3+ and R  to 20 pounds to improve use of RUE for adls. Assessment  Performance deficits / Impairments: Decreased ADL status, Decreased functional mobility , Decreased ROM, Decreased strength, Decreased safe awareness, Decreased cognition, Decreased endurance, Decreased balance, Decreased high-level IADLs, Decreased fine motor control, Decreased coordination  Treatment Diagnosis: Impaired self care status due to decreased work ilana, balance, RUE motor post recent CVA followed by encephalopathy  Prognosis: Good  Decision Making: Low Complexity  History: Acute encephalopathy, thought to be secondary to hypoperfusion Recrudescence of right-sided stroke symptoms, admit post syncope event during dialysis with AURELIA on CKI.   Exam: 11 performance deficits  Assistance / Modification: low  REQUIRES OT FOLLOW UP: Yes  Plan  Times per week: Plan Of Care:  Due to impaired functional endurance and/or medical issues, the patient is to be seen for a combined total of at least a  900 minutes over 7 days of Physical, Occupational and/or Speech Therapy. Times per day: Twice a day  Current Treatment Recommendations: Self-Care / ADL, Strengthening, ROM, Balance Training, Functional Mobility Training, Endurance Training, Neuromuscular Re-education, Cognitive Reorientation, Safety Education & Training, Patient/Caregiver Education & Training, Equipment Evaluation, Education, & procurement, Home Management Training, Cognitive/Perceptual Training  OT Education  OT Education: OT Role, Plan of Care, ADL Adaptive Strategies, Transfer Training  Patient Education: BLE lymphedema -ed pt to bring in compression garments and brandon from home.        OT Individual Minutes  Time In: 5178  Time Out: 1950  Minutes: 61     09/18/21 1453 09/18/21 1454   OT Individual Minutes   Time In [address] [place] [address] Drive   Time Out [id number]   [facility] [phone]     Electronically signed by Ananda Muir OT on 9/18/21 at 3:59 PM EDT

## 2021-09-18 NOTE — PROGRESS NOTES
Physical Therapy  Facility/Department: [facility] ACUTE REHAB  Daily Treatment Note  NAME: Hayden Trevizo  : 1960  MRN: [id number]    Date of Service: 2021    Discharge Recommendations:  Patient would benefit from continued therapy after discharge, Home with assist PRN   PT Equipment Recommendations  Equipment Needed: No  Other: Pt has rollator at home    Assessment   Body structures, Functions, Activity limitations: Decreased functional mobility ; Decreased strength;Decreased endurance;Decreased safe awareness;Decreased balance;Decreased cognition  Assessment: Pt with slight R sligth weakness from recent CVA, decreased tolerance to activity due to recent CVA and Hemodialysis. Will conitnue POC to improve fucntion/endurance for safe discharge to home. Treatment Diagnosis: impaired mobility due to weakness and balance issues  Specific instructions for Next Treatment: Monitor sao2 with activity. instruct in strengthening and balance activities, instruct in standing balance and transfers/ gait  as LE strength improves  Prognosis: Good  Decision Making: Medium Complexity  History: admitted due to acute encephalopathy  Exam: ROM, MMT, balance and mobility assessments  Clinical Presentation: Decreasd endurance, cognitive deficts, weaknesss  Barriers to Learning: Memory deficits  REQUIRES PT FOLLOW UP: Yes  Activity Tolerance  Activity Tolerance: Patient limited by fatigue;Patient limited by endurance     Patient Diagnosis(es): There were no encounter diagnoses.      has a past medical history of Backache, unspecified, CHF (congestive heart failure) ([facility] [address] Chronic kidney disease, Coronary atherosclerosis of artery bypass graft, Cramp of limb, Gallstones, Hypertension, Insomnia, Pneumonia, Type II or unspecified type diabetes mellitus with renal manifestations, not stated as uncontrolled(250.40), Type II or unspecified type diabetes mellitus without mention of complication, not stated as uncontrolled, and Unspecified vitamin D deficiency. has a past surgical history that includes Coronary artery bypass graft; Knee arthroscopy; Carpal tunnel release; Breast surgery; Tonsillectomy; Hand surgery; Ankle fracture surgery; Cholecystectomy, open (N/A); and IR TUNNELED CVC PLACE WO SQ PORT/PUMP > 5 YEARS (8/18/2021). Restrictions  Restrictions/Precautions  Restrictions/Precautions: Fall Risk  Required Braces or Orthoses?: Yes  Implants present? :  (R upper chest port.)  Required Braces or Orthoses  Right Lower Extremity Brace:  (Lymphedema wraps)  Left Lower Extremity Brace:  (Lymphedema wraps)  Position Activity Restriction  Other position/activity restrictions: up w/ assist, Hemodialysis M -W _ F  Subjective   General  Chart Reviewed: Yes  Additional Pertinent Hx: Per PM&R note: Cornelius Fuentes is a 59 y.o. right-handed female with history of recent CVA, AURELIA on CKD (on hemodialysis), CAD, CHF, atrial fibrillation, HTN, type 2 diabetes admitted [facility] on 9[facility]2021. She initially presented following a syncopal episode in dialysis, after which she was reportedly confused. She has reported right lower limb weakness and heaviness. Imaging of the brain has been unremarkable. EEG showed mild encephalopathy with no epileptiform activity. Symptoms are thought to be secondary to hypoperfusion. Pulmonology has been consulted for new oxygen requirement overnight. She currently reports ongoing impaired memory and right-sided numbness/tingling and weakness. She thinks that symptoms are improving a little bit in the right upper limb. She also reports intermittent headache and dizziness. She notes shortness of breath with activity and decreased urine output as well. Pt admitted to rehab unit on 9/17/21  Response To Previous Treatment: Patient with no complaints from previous session.   Family / Caregiver Present: No  Referring Practitioner: Dr. Sebastian Ojeda: Patient in good spirits and agreeable to PT.  Pain Screening  Patient Currently in Pain: Yes  Pain Assessment  Pain Assessment: 0-10  Pain Level: 6  Patient's Stated Pain Goal: No pain  Pain Type: Acute pain  Pain Location: Generalized  Pain Descriptors: Aching  Pain Frequency: Intermittent  Clinical Progression: Not changed  Response to Pain Intervention: Asleep with RR greater than 10  Vital Signs  BP Location: Left upper arm  Level of Consciousness: Alert (0)  Patient Currently in Pain: Yes  Patient Observation  Observations: Pt requires constant redirection to stay on task     Objective   Bed mobility  Comment: (P) Unable to assess as patient was in chair upon arrival.  Transfers  Sit to Stand: Stand by assistance  Stand to sit: Stand by assistance  Comment: Transfers completed with B UE support on RW. Sit<>stand completed 4x from various seat heights. Ambulation  Ambulation?: Yes  More Ambulation?: No  Ambulation 1  Surface: level tile  Device: Rolling Walker  Assistance: Stand by assistance;Contact guard assistance  Quality of Gait: slight foward flexed posture with short step length, increase fatique noted with distance   Gait Deviations: Slow Annie; Increased ELISABET; Decreased step length;Decreased step height  Distance: 35ft+40ft+15ftx2  Comments: 1 Seated rest break required due to heaviness of R LE  Stairs/Curb  Stairs?: No     Balance  Posture: Fair  Sitting - Static: Good  Sitting - Dynamic: Fair;+  Standing - Static: Good (RW)  Standing - Dynamic: Fair;+ (RW)  Comments: Standing balance completed with rollator. Other exercises  Other exercises 1: Seated B LE exercises AROM 15-20x   Patient deferred further activities due to fatigue.        Goals  Short term goals  Time Frame for Short term goals: 5 days  Short term goal 1: pt to tolerate 1/2 hour to 45 minutes for therapuetic exercise and activity  Short term goal 2: pt to demonstrate good technique for LE strengthening and  balance activities  Short term goal 3: pt to demonstrate independent bed mobility with out bed rail. Short term goal 4: pt to demonstrate safe tech for transfers from varies surfaces, at supervsion level  Short term goal 5: pt able to ambulate with rollator distance fo 120 ft or more, SBA, level surfaces  Short term goal 6: pt able to go up and down 3 to 5 steps with 2 rails CGA to improve strength and endurance. Short term goal 7: pt to demonstrate goobalance during gait with rollator. Long term goals  Time Frame for Long term goals : By DC  Long term goal 1: pt able to demonstrate mod-I transfers  Long term goal 2:  pt able to ambulate safely on level as well as carpetted/outside terraine with rollator, atleast distance of 150 ft, mod-I  Long term goal 3: pt able to go up and down 5 steps with2 rails , SBA to improve endurance. Long term goal 4: Improve gait speed by demonstrating ability to ambulate 150 ft with rollator in 2 minutes to improve fucntion. Long term goal 5: Improve R LE strength by 1/3 MMG to improve fucntion. Patient Goals   Patient goals : get better    Plan    Plan  Times per week: 900 minutes/week for combined therapy of PT/OT/ST due to decreased tolerance to activity and Hemodialysis. Specific instructions for Next Treatment: Monitor sao2 with activity. instruct in strengthening and balance activities, instruct in standing balance and transfers/ gait  as LE strength improves  Current Treatment Recommendations: Strengthening, Functional Mobility Training, Equipment Evaluation, Education, & procurement, Safety Education & Training, Gait Training, Transfer Training, Balance Training, Endurance Training, Positioning, Patient/Caregiver Education & Training, Stair training, Neuromuscular Re-education  Safety Devices  Type of devices:  All fall risk precautions in place, Gait belt, Call light within reach, Patient at risk for falls, Nurse notified, Left in chair     Therapy Time   Individual Concurrent Group Co-treatment   Time In 1428         Time Out [address] [place], [place]

## 2021-09-18 NOTE — PROGRESS NOTES
Physical Therapy    Facility/Department: [facility]  Initial Assessment    NAME: Altagracia Palafox  : 1960  MRN: [id number]    Date of Service: 2021    Discharge Recommendations:  Patient would benefit from continued therapy after discharge, Home with assist PRN   PT Equipment Recommendations  Equipment Needed: No  Other: Pt has rollator at home    Assessment   Body structures, Functions, Activity limitations: Decreased functional mobility ; Decreased strength;Decreased endurance;Decreased safe awareness;Decreased balance;Decreased cognition  Assessment: Pt with slight R sligth weakness from recent CVA, decreased tolerance to activity due to recent CVA and Hemodialysis. Will conitnue POC to improve fucntion/endurance for safe discharge to home. Treatment Diagnosis: impaired mobility due to weakness and balance issues  Specific instructions for Next Treatment: Monitor sao2 with activity. instruct in strengthening and balance activities, instruct in standing balance and transfers/ gait  as LE strength improves  Prognosis: Good  Decision Making: Medium Complexity  History: admitted due to acute encephalopathy  Exam: ROM, MMT, balance and mobility assessments  Clinical Presentation: Decreasd endurance, cognitive deficts, weaknesss  PT Education: PT Role;Goals; General Safety;Gait Training;Plan of Care;Transfer Training;Energy Conservation; Functional Mobility Training  Barriers to Learning: Memory deficits  REQUIRES PT FOLLOW UP: Yes  Activity Tolerance  Activity Tolerance: Patient limited by fatigue;Patient limited by endurance       Patient Diagnosis(es): There were no encounter diagnoses.      has a past medical history of Backache, unspecified, CHF (congestive heart failure) ([facility] [address] Chronic kidney disease, Coronary atherosclerosis of artery bypass graft, Cramp of limb, Gallstones, Hypertension, Insomnia, Pneumonia, Type II or unspecified type diabetes mellitus with renal manifestations, not stated as uncontrolled(250.40), Type II or unspecified type diabetes mellitus without mention of complication, not stated as uncontrolled, and Unspecified vitamin D deficiency. has a past surgical history that includes Coronary artery bypass graft; Knee arthroscopy; Carpal tunnel release; Breast surgery; Tonsillectomy; Hand surgery; Ankle fracture surgery; Cholecystectomy, open (N/A); and IR TUNNELED CVC PLACE WO SQ PORT/PUMP > 5 YEARS (8/18/2021). Restrictions  Restrictions/Precautions  Restrictions/Precautions: Fall Risk  Required Braces or Orthoses?: Yes  Implants present? :  (R upper chest port.)  Required Braces or Orthoses  Right Lower Extremity Brace:  (Lymphedema wraps)  Left Lower Extremity Brace:  (Lymphedema wraps)  Position Activity Restriction  Other position/activity restrictions: up w/ assist, Hemodialysis M -W _ F  Vision/Hearing  Vision: Impaired  Vision Exceptions: Wears glasses for reading  Hearing: Within functional limits     Subjective  General  Chart Reviewed: Yes  Patient assessed for rehabilitation services?: Yes  Additional Pertinent Hx: Per PM&R note: Vanessa Huang is a 59 y.o. right-handed female with history of recent CVA, AURELIA on CKD (on hemodialysis), CAD, CHF, atrial fibrillation, HTN, type 2 diabetes admitted to [facility] on 9/13/2021. She initially presented following a syncopal episode in dialysis, after which she was reportedly confused. She has reported right lower limb weakness and heaviness. Imaging of the brain has been unremarkable. EEG showed mild encephalopathy with no epileptiform activity. Symptoms are thought to be secondary to hypoperfusion. Pulmonology has been consulted for new oxygen requirement overnight. She currently reports ongoing impaired memory and right-sided numbness/tingling and weakness. She thinks that symptoms are improving a little bit in the right upper limb. She also reports intermittent headache and dizziness.   She notes shortness of breath with activity and decreased urine output as well. Pt admitted to rehab unit on 9/17/21  Family / Caregiver Present: No  Referring Practitioner: Dr. Dakotah Ro  Referral Date : 09/17/21  Diagnosis: Encephalopathy  Follows Commands: Impaired (confused)  Other (Comment): Memory deficts  Pain Screening  Patient Currently in Pain: Denies  Pain Assessment  Clinical Progression: Not changed  Response to Pain Intervention: Asleep with RR greater than 10  Vital Signs  BP Location: Left upper arm  Level of Consciousness: Alert (0)  Patient Currently in Pain: Denies  Patient Observation  Observations: Pt requires constant redirection to stay on task        Orientation  Orientation  Overall Orientation Status: Within Functional Limits  Orientation Level: Oriented to place;Oriented to time;Oriented to person  Social/Functional History  Social/Functional History  Lives With: Other (comment) (Lives with mother)  Type of Home:  (Condo)  Home Layout: One level  Home Access: Ramped entrance  Bathroom Shower/Tub: Walk-in shower, Doors (Threshold to step over)  [employer]: Handicap height  Bathroom Equipment: Grab bars in shower, Built-in shower seat (Pt. reports using wall of shower to get off toilet)  Bathroom Accessibility: Walker accessible  Home Equipment: 4 wheeled walker, Cane, Sock aid, Reacher (Has a recliner at home)  ADL Assistance: Independent  Homemaking Assistance: Needs assistance (Pt. uses motorized cart at the grocery store, sister does grocery shopping typically. 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 sleeps in flat bed at home,  Additional Comments: Pt recently discharged from Rehab at [facility] on 8- w/ home health was set up for PT, OT and Speech. Pt was going to dialysis 3 X/ week. .  Pt is a confused/memory impairement. Pt.'s mother becoming less mobile, pt.'s sister helps out mother. Pt. reports that 2 sisters can assist. Pt.s father in [facility] home. Pt reports that her 2 sisters live close by and that her one sister is retired and able to assist as needed. Cognition        Objective          AROM RLE (degrees)  RLE AROM: WFL  RLE General AROM: LE swelling noted. Sister to bring pt's stockings and lymphedem wraps from home. AROM LLE (degrees)  LLE AROM : WFL  LLE General AROM: LE swelling noted. Sister to bring pt's stockings and lymphedem wraps from home.   AROM RUE (degrees)  RUE General AROM: see OT for UE assessment  AROM LUE (degrees)  LUE General AROM: see OT for UE assessment  Strength RLE  Comment: Grossly, 3+/5  Strength LLE  Comment: Grossly  4-/5  Strength RUE  Comment: see OT for UE assessment  Strength LUE  Comment: see OT for UE assessment     Sensation  Overall Sensation Status: Impaired (Numbness in B feet/calves, B hands 2* diabetic neuropathy)  Bed mobility  Rolling to Left: Stand by assistance  Rolling to Right: Stand by assistance  Supine to Sit: Minimal assistance (From flat bed, needed assist for trunk.)  Sit to Supine: Stand by assistance  Transfers  Sit to Stand: Stand by assistance  Stand to sit: Stand by assistance  Bed to Chair: Stand by assistance (RW)  Stand Pivot Transfers: Stand by assistance (RW)  Comment: pt stood next to the bed for less than 1 minute- pt's right knee buckling w/ standing- Pt fearful of falling and C/O weakness and heaviness of right LE  Ambulation  Ambulation?: Yes  Ambulation 1  Surface: level tile  Device: Rolling Walker  Assistance: Stand by assistance;Contact guard assistance  Quality of Gait: slight foward flexed posture with short step length, increase fatique noted with distance   Gait Deviations: Slow Annie; Increased ELISABET; Decreased step length;Decreased step height  Distance: 72 ft with 1 standing break (2 Minutes) =21.94 mts/secs, gait speed of 0.183 mts/sec. Comments: Sao2 90% at rest on room air, sao2 93 to 94% with activity  Ambulation 2  Surface - 2: level tile  Device 2: Rolling Walker  Assistance 2: Stand by assistance;Contact guard assistance  Quality of Gait 2: same as above  Gait Deviations: Decreased step length;Decreased step height;Decreased arm swing  Distance: 50 ft  Stairs/Curb  Stairs?: No     Balance  Posture: Fair  Sitting - Static: Good  Sitting - Dynamic: Fair;+  Standing - Static: Good (RW)  Standing - Dynamic: Fair;+ (RW)        Plan   Plan  Times per week: 900 minutes/week for combined therapy of PT/OT/ST due to decreased tolerance to activity and Hemodialysis. Specific instructions for Next Treatment: Monitor sao2 with activity. instruct in strengthening and balance activities, instruct in standing balance and transfers/ gait  as LE strength improves  Current Treatment Recommendations: Strengthening, Functional Mobility Training, Equipment Evaluation, Education, & procurement, Safety Education & Training, Gait Training, Transfer Training, Balance Training, Endurance Training, Positioning, Patient/Caregiver Education & Training, Stair training, Neuromuscular Re-education  Safety Devices  Type of devices: All fall risk precautions in place, Gait belt, Call light within reach, Patient at risk for falls, Nurse notified, Left in chair    G-Code       OutComes Score   Gait with rolling walker- 72 ft with 1 standing break (2 Minutes) =21.94 mts/secs, gait speed of 0.183 mts/sec.                                                   AM-PAC Score             Goals  Short term goals  Time Frame for Short term goals: 5 days  Short term goal 1: pt to tolerate 1/2 hour to 45 minutes for therapuetic exercise and activity  Short term goal 2: pt to demonstrate good technique for LE strengthening and  balance activities  Short term goal 3: pt to demonstrate  independent bed mobility with out bed rail. Short term goal 4: pt to demonstrate safe tech for transfers from varies surfaces, at supervsion level  Short term goal 5: pt able to ambulate with rollator distance fo 120 ft or more, SBA, level surfaces  Short term goal 6: pt able to go up and down 3 to 5 steps with 2 rails CGA to improve strength and endurance. Short term goal 7: pt to demonstrate goobalance during gait with rollator. Long term goals  Time Frame for Long term goals : By DC  Long term goal 1: pt able to demonstrate mod-I transfers  Long term goal 2:  pt able to ambulate safely on level as well as carpetted/outside terraine with rollator, atleast distance of 150 ft, mod-I  Long term goal 3: pt able to go up and down 5 steps with2 rails , SBA to improve endurance. Long term goal 4: Improve gait speed by demonstrating ability to ambulate 150 ft with rollator in 2 minutes to improve fucntion. Long term goal 5: Improve R LE strength by 1/3 MMG to improve fucntion.    Patient Goals   Patient goals : get better       Therapy Time   Individual Concurrent Group Co-treatment   Time In 0908         Time Out 0955         Minutes 47         Timed Code Treatment Minutes: 25 Minutes       Roger Ribera, PT

## 2021-09-18 NOTE — PROGRESS NOTES
Speech Language Pathology  Speech Language Pathology  [facility] Acute Rehab Unit at [place]  Cognitive/Speech/Language  Treatment Note    Date: 2021  Patients Name: Luzma Henderson  MRN: [id number]  Diagnosis: Cognitive impairment   Patient Active Problem List   Diagnosis Code    Coronary artery disease I25.10    Chronic diastolic heart failure (HCC) I50.32    Diabetic polyneuropathy associated with type 2 diabetes mellitus (HCC) E11.42    History of coronary artery bypass graft Z95.1    Iron deficiency anemia D50.9    Spinal stenosis of lumbar region with neurogenic claudication M48.062    Mixed hyperlipidemia E78.2    Stage 4 chronic kidney disease (HCC) N18.4    Type 2 diabetes mellitus with kidney complication, with long-term current use of insulin ([facility]) E11.29, Z79.4    Syncope and collapse R55    Obesity, Class II, BMI 35-39.9 E66.9    Thyroid nodule greater than or equal to 1 cm in diameter incidentally noted on imaging study E04.1    Essential hypertension I10    Anemia in stage 4 chronic kidney disease (HCC) N18.4, D63.1    Chronic ischemic heart disease I25.9    Acute cerebrovascular accident (CVA) ([facility] [address] I63.9    Stroke-like symptoms R29.90    Morbid obesity with BMI of 40.0-44.9, adult ([facility]) E66.01, Z68.41    Acute encephalopathy G93.40    Encephalopathy G93.40       Pain: 0/10    Cognitive Treatment    Treatment time:      Subjective: [x] Alert [x] Cooperative     [] Confused     [] Agitated    [] Lethargic    Objective/Assessment:  Attention: functional throughout    Recall/Problem solving: Differences/similarities between 2 objects- 90%, 100% c cues. Pt. Utilizing word finding and memory strategies (circumlocution and association) throughout task Minerva. Other: Word finding:   Generative namin members of concrete category (no time constraint) 38%, 88% c cues. Pt. Demonstrated  long response latency across all tasks.       Pt. Sister on Facetime and other sister and niece were present in room and were educ. Re: pt. Word finding and memory performance. ST also educ on pt. Long term vs short term memory difficulties. Plan:  [x] Continue ST services    [] Discharge from ST:      Discharge recommendations: [] Inpatient Rehab   [] [place]   [] Outpatient Therapy  [] Follow up at trauma clinic   [] Other:       Treatment completed by: Vitaliy Staley. RINA/SLP

## 2021-09-18 NOTE — FLOWSHEET NOTE
Writer responded to consult for emotional support. Patient was in good spirits today after therapy and getting a shower. She was having lunch as we talked, and she updated writer on her progress. She talked for the first time about her spirituality and wondering what the lesson is God has for her in the memory loss. She finds comfort in writing things down as she remembers them or as something is said that resonates with her. She is hopeful that her memory will come back, but also realistic that it may not. Writer ended the visit when pt's sister Gilda Nieves and niece Rachel Valencia came to visit.      09/18/21 1242   Encounter Summary   Services provided to: Patient   Referral/Consult From: Nurse   Support System Family members   Continue Visiting   (9-18-21)   Complexity of Encounter Moderate   Length of Encounter 30 minutes   Spiritual Assessment Completed Yes   Spiritual/Rastafari   Type Spiritual support   Grief and Life Adjustment   Type Adjustment to illness   Assessment Calm; Approachable   Intervention Active listening;Explored feelings, thoughts, concerns;Explored coping resources;Nurtured hope;Sustaining presence/ Ministry of presence; Discussed illness/injury and it's impact; Discussed relationship with God   Outcome Expressed gratitude;Engaged in conversation;Expressed feelings/needs/concerns;Receptive;Encouraged; Hopeful

## 2021-09-18 NOTE — PROGRESS NOTES
Speech Language Pathology  Facility/Department: [facility] ACUTE REHAB  Initial Speech/Language/Cognitive Assessment    NAME: Ajit Altamirano  : 1960   MRN: [id number]  ADMISSION DATE: 2021  ADMITTING DIAGNOSIS: has Coronary artery disease; Chronic diastolic heart failure ([address] Diabetic polyneuropathy associated with type 2 diabetes mellitus ([address] History of coronary artery bypass graft; Iron deficiency anemia; Spinal stenosis of lumbar region with neurogenic claudication; Mixed hyperlipidemia; Stage 4 chronic kidney disease ([address] Type 2 diabetes mellitus with kidney complication, with long-term current use of insulin ([address] Syncope and collapse; Obesity, Class II, BMI 35-39.9; Thyroid nodule greater than or equal to 1 cm in diameter incidentally noted on imaging study; Essential hypertension; Anemia in stage 4 chronic kidney disease ([address] Chronic ischemic heart disease; Acute cerebrovascular accident (CVA) ([address] Stroke-like symptoms; Morbid obesity with BMI of 40.0-44.9, adult ([address] Acute encephalopathy; and Encephalopathy on their problem list.    Date of Eval: 2021   Evaluating Therapist: BRAYAN Moreira    RECENT RESULTS  CT OF HEAD/MRI:   - MRI brain- normal exam    Primary Complaint:   Per PM&R H&P: Ajit Altamirano  is a 59 y.o. right-handed single    female admitted to the [address] [place] [address] unit on 2021. She has history of recent CVA, AURELIA on CKD (on hemodialysis), CAD, CHF, atrial fibrillation, HTN, type 2 diabetes admitted to [facility] 2021.       She initially presented following a syncopal episode in dialysis, after which she was reportedly confused. Sen Goldstein has reported right lower limb weakness and heaviness.  Imaging of the brain has been unremarkable.   EEG showed mild encephalopathy with no epileptiform activity.  Symptoms are thought to be secondary to hypoperfusion.  Pulmonology has been consulted for new oxygen requirement overnight.     She is known to our service from acute rehab admission following recent CVA.     She currently reports ongoing impaired memory and right-sided numbness/tingling and weakness.  She thinks that symptoms are improving a little bit in the right upper limb.  She also reports intermittent headache and dizziness.  She notes shortness of breath with activity and decreased urine output as well. is currently requiring assistance for self-care activities and mobility prompting this admission. Pain:  Pain Assessment  Pain Assessment: 0-10  Pain Level: 0  Patient's Stated Pain Goal: No pain  Clinical Progression: Not changed  Response to Pain Intervention: Asleep with RR greater than 10    Assessment:      Diagnosis: Pt. demonstrates mild anomia in conversation and in structured tasks, but is able to Minerva circumlocute to find words. pt. demonstrating difficulty with thought organization during reasoning tasks. Pt. continues to exhibit intact short term recall, but an impairment in long term recall (Ie, Jefry Martinez is a flashlight? \", \"I can't remember Trump being president. \", \"I can't remember what my father looked like the last time I saw him. \", \"I don't remember what Covid is.\"). Pt. continues to write in notebook to assist self with recall of long term information. Treatment recommended to focus on improving recall, word finding and thought organization for problem solving/reasoning. Recommendations:  Requires SLP Intervention: Yes             Plan:   Goals:  Short-term Goals  Goal 1: Increase problem solving and reasoning to 90%  Goal 2: Increase word finding in conversation and in structured tasks to 90%.    Patient/family involved in developing goals and treatment plan: family not present    Subjective:   Previous level of function and limitations: independent        Vision  Vision: Impaired  Vision Exceptions: Wears glasses for reading  Hearing  Hearing: Within functional limits           Objective: Oral/Motor  Oral Motor: Within functional limits    Auditory Comprehension  Comprehension: Within Functional Limits         Expression  Primary Mode of Expression: Verbal    Verbal Expression  Verbal Expression: Exceptions to functional limits  Divergent: Mild (9 items in 60 seconds)  Conversation: Mild (mild anomia in conversation)         Motor Speech  Motor Speech: Within Functional Limits         Cognition:      Orientation  Overall Orientation Status: Within Normal Limits  Attention  Attention: Within Functional Limits  Memory  Memory: Exceptions to [facility] (3 word immediate and delayed recall- 100%, paragraph recall- 100%. pt. able to recall names of staff members and specific things discussed days ago in tx.  Pt. asking Kaleigh Munguia is a flashlight? \" when asked to sequence steps in task.)  Abstract Reasoning  Abstract Reasoning: Exceptions to [facility]  Convergent Thinking: Moderate  Safety/Judgement  Safety/Judgement: Within Functional Limits      Education:  Patient Education Response: Verbalizes understanding          Therapy Time:   Individual Concurrent Group Co-treatment   Time In 1020         Time Out 1053         Minutes 1641 [facility] A.CCC/SLP    9/18/2021 11:25 AM

## 2021-09-18 NOTE — PLAN OF CARE
Problem: Falls - Risk of:  Goal: Will remain free from falls  Description: Will remain free from falls  9/18/2021 1113 by Gretel Chatman RN  Outcome: Ongoing  9/18/2021 0414 by Richa Duenas RN  Outcome: Ongoing  Goal: Absence of physical injury  Description: Absence of physical injury  9/18/2021 1113 by Gretel Chatman RN  Outcome: Ongoing  9/18/2021 0414 by Richa Duenas RN  Outcome: Ongoing     Problem: Skin Integrity:  Goal: Will show no infection signs and symptoms  Description: Will show no infection signs and symptoms  9/18/2021 1113 by Gretel Chatman RN  Outcome: Ongoing  9/18/2021 0414 by Richa Duenas RN  Outcome: Ongoing  Goal: Absence of new skin breakdown  Description: Absence of new skin breakdown  9/18/2021 1113 by Gretel Chatman RN  Outcome: Ongoing  9/18/2021 0414 by Richa Duenas RN  Outcome: Ongoing     Problem: SAFETY  Goal: LTG - Patient will demonstrate safety requirements appropriate to situation/environment  Outcome: Ongoing  Goal: LTG - patient will utilize safety techniques  Outcome: Ongoing  Goal: STG - patient locks brakes on wheelchair  Outcome: Ongoing  Goal: STG - Patient uses call light consistently to request assistance with transfers  Outcome: Ongoing

## 2021-09-18 NOTE — H&P
Physical Medicine & Rehabilitation History and Physical  [facility] Acute Rehabilitation Unit     Primary care provider: AFSANEH Hart CNP     Chief Complaint and Reason for Rehabilitation Admission:   Encephalopathy    History of Present Illness:  Sunny Rivera  is a 59 y.o. right-handed single    female admitted to the [address] [place] [address] unit on 9/17/2021. She has history of recent CVA, AURELIA on CKD (on hemodialysis), CAD, CHF, atrial fibrillation, HTN, type 2 diabetes admitted to [facility] on 9/13/2021.       She initially presented following a syncopal episode in dialysis, after which she was reportedly confused. She has reported right lower limb weakness and heaviness. Imaging of the brain has been unremarkable. EEG showed mild encephalopathy with no epileptiform activity. Symptoms are thought to be secondary to hypoperfusion. Pulmonology has been consulted for new oxygen requirement overnight.     She is known to our service from acute rehab admission following recent CVA.     She currently reports ongoing impaired memory and right-sided numbness/tingling and weakness. She thinks that symptoms are improving a little bit in the right upper limb. She also reports intermittent headache and dizziness. She notes shortness of breath with activity and decreased urine output as well. is currently requiring assistance for self-care activities and mobility prompting this admission.     Premorbid function:  Needing assistance with homemaking  Current Function:  PT:            Restrictions/Precautions: Fall Risk  Other position/activity restrictions: up w/ assist  Required Braces or Orthoses  Right Lower Extremity Brace:  (Lymphedema wraps)  Left Lower Extremity Brace:  (Lymphedema wraps)   Transfers  Sit to Stand: Supervision  Stand to sit: Supervision  Bed to Chair: Supervision  Stand Pivot Transfers: Supervision  Comment: pt stood next to the bed for less than 1 minute- pt's right knee buckling w/ standing- Pt fearful of falling and C/O weakness and heaviness of right LE  Ambulation 1  Surface: level tile  Device: Rolling Walker  Assistance: Stand by assistance  Quality of Gait: slight foward flexed posture with short step lengthb   Gait Deviations: Slow Annie, Increased ELISABET, Decreased step length, Decreased step height  Distance: 10ft from bed to toilet; 20ft from toilet to bedside chair  Comments: patient less fearful this PM. Able to amb. distanvce with no buckeling of R knee      Transfers  Sit to Stand: Supervision  Stand to sit: Supervision  Bed to Chair: Supervision  Stand Pivot Transfers: Supervision  Comment: pt stood next to the bed for less than 1 minute- pt's right knee buckling w/ standing- Pt fearful of falling and C/O weakness and heaviness of right LE  Ambulation  Ambulation?: Yes  Ambulation 1  Surface: level tile  Device: Rolling Walker  Assistance: Stand by assistance  Quality of Gait: slight foward flexed posture with short step lengthb   Gait Deviations: Slow Annie, Increased ELISABET, Decreased step length, Decreased step height  Distance: 10ft from bed to toilet; 20ft from toilet to bedside chair  Comments: patient less fearful this PM. Able to amb. distanvce with no buckeling of R knee      Surface: level tile  Ambulation 1  Surface: level tile  Device: Rolling Walker  Assistance: Stand by assistance  Quality of Gait: slight foward flexed posture with short step lengthb   Gait Deviations: Slow Annie, Increased ELISABET, Decreased step length, Decreased step height  Distance: 10ft from bed to toilet; 20ft from toilet to bedside chair  Comments: patient less fearful this PM. Able to amb. distanvce with no buckeling of R knee                                OT:     ADL  Feeding: Contact guard assistance  Grooming: Stand by assistance  UE Bathing:  Moderate assistance  LE Bathing: Maximum assistance  UE Dressing: Minimal assistance  LE Dressing: Minimal assistance  Toileting: Dependent/Total  Additional Comments: ADL scores based on clinical reasoning and skilled observation unless otherwise noted. Pt was able to maria de jesus/doff pants only requiring vc for safety due to trying to stand to pull up pants while standing on the legs of the pants. pt doff/maria de jesus gown requiring assistance with ties and buttons on sleeves. Balance  Sitting Balance: Contact guard assistance (CGA-SBA)  Standing Balance: Contact guard assistance   Standing Balance  Time: ~1min  Activity: standing from recliner to maria de jesus/doff pants  Comment: with RW and mod vc for safety  Functional Mobility  Functional Mobility Comments: Functional mobility deferred due to weakness and fear of falling  Bed mobility  Rolling to Left: Supervision  Rolling to Right: Supervision  Supine to Sit: Supervision  Sit to Supine: Unable to assess  Scooting: Supervision  Comment: Pt required increased time to complete bed mobility. Pt reports that right side was feeling heavy. Pt sat EOB with CGA-SBA due to increased dizziness/feeling foggy. Transfers  Sit to stand: 2 Person assistance, Moderate assistance  Stand to sit: 2 Person assistance, Moderate assistance  Transfer Comments: Verbal cues for hand placement and safety. Pt demonstrated right knee buckling. Pt demonstrated increased anxiety/fear of falling while standing.         ST:      Attention: functional throughout     Orientation: n/a     Recall: Pt recalling writer and other ST that saw her yesterday along with specific tasks she had difficulty with. No family present in room. Pt actively using \"memory book\" in session to recall previous day's events/important information, etc.  Pt. Requesting assistance from ST in recalling specific details to add to her book. Pt ed provided re use of external memory aides to facilitate recall.  Pt verbalized understanding of all recommendations.       Other: Word finding:   Generative namin members of concrete category (no time constraint) 100%. Opposittes- 88%, 100% c cues. Dual meanings- 80%, 90% c cues. Pt. Demonstrated very long response latency across all tasks.       Past Medical History:      Diagnosis Date    Backache, unspecified     CHF (congestive heart failure) (HCC)     Chronic kidney disease     Coronary atherosclerosis of artery bypass graft     Cramp of limb     Gallstones     Hypertension     Insomnia     Pneumonia     Type II or unspecified type diabetes mellitus with renal manifestations, not stated as uncontrolled(250.40)     Type II or unspecified type diabetes mellitus without mention of complication, not stated as uncontrolled     Unspecified vitamin D deficiency        Past Surgical History:      Procedure Laterality Date    ANKLE FRACTURE SURGERY      BREAST SURGERY      CARPAL TUNNEL RELEASE      CHOLECYSTECTOMY, OPEN N/A     CORONARY ARTERY BYPASS GRAFT      x3    HAND SURGERY      IR TUNNELED CATHETER PLACEMENT GREATER THAN 5 YEARS  8/18/2021    IR TUNNELED CATHETER PLACEMENT GREATER THAN 5 YEARS 8/18/2021 [facility] SPECIAL PROCEDURES    KNEE ARTHROSCOPY      right    TONSILLECTOMY         Allergies:    Adhesive tape, Ace inhibitors, Iv dye [iodides], and Metformin and related    Medications   Scheduled Meds:   sodium chloride flush  5-40 mL IntraVENous 2 times per day    apixaban  5 mg Oral BID    [START ON 9/18/2021] aspirin  81 mg Oral Daily    [START ON 9/18/2021] atorvastatin  80 mg Oral Daily    busPIRone  7.5 mg Oral TID    carvedilol  6.25 mg Oral BID    [START ON 9/18/2021] febuxostat  40 mg Oral Daily    [START ON 9/18/2021] ferrous sulfate  325 mg Oral BID WC    [START ON 9/18/2021] furosemide  80 mg Oral BID    gabapentin  300 mg Oral BID    insulin glargine  53 Units SubCUTAneous BID    [START ON 9/18/2021] insulin lispro  0-12 Units SubCUTAneous TID     insulin lispro  0-6 Units SubCUTAneous Nightly    [START ON 9/18/2021] pantoprazole  40 mg Oral QAM AC    ranolazine  1,000 mg Oral BID    [START ON 9/18/2021] tamsulosin  0.4 mg Oral Daily    traZODone  75 mg Oral Nightly    polyethylene glycol  17 g Oral Daily     Continuous Infusions:   sodium chloride      dextrose       PRN Meds:.sodium chloride, acetaminophen, ondansetron **OR** ondansetron, sodium chloride flush, potassium chloride **OR** potassium alternative oral replacement **OR** potassium chloride, acetaminophen, albumin human, dextrose, dextrose, glucagon (rDNA), glucose, perflutren lipid microspheres, polyethylene glycol, sodium citrate, sodium citrate, acetaminophen, senna, bisacodyl       Diagnostics:       CBC:   Recent Labs     09/15/21  0420 09/16/21  0423 09/17/21  0556   WBC 5.4 4.5 4.6   RBC 3.22* 3.29* 3.53*   HGB 10.4* 10.5* 11.1*   HCT 31.6* 31.5* 33.7*   MCV 97.9 95.7 95.6   RDW 15.0* 15.0* 14.9   * 133* 143*     BMP:   Recent Labs     09/15/21  0420 09/15/21  1901 09/16/21  0423 09/17/21  0556     --  138 142   K 4.3  --  3.7 4.2   *  --  103 105   CO2 21  --  27 28   PHOS  --  2.5*  --   --    BUN 29*  --  21 26*   CREATININE 2.73*  --  2.17* 2.64*     BNP: No results for input(s): BNP in the last 72 hours. PT/INR: No results for input(s): PROTIME, INR in the last 72 hours. APTT: No results for input(s): APTT in the last 72 hours. CARDIAC ENZYMES: No results for input(s): CKMB, CKMBINDEX, TROPONINT in the last 72 hours. Invalid input(s): CKTOTAL;3  FASTING LIPID PANEL:  Lab Results   Component Value Date    CHOL 105 04/09/2015    HDL 43 04/09/2015    TRIG 168 04/09/2015     LIVER PROFILE:   Recent Labs     09/15/21  0420 09/16/21  0423 09/17/21  0556   AST 14 14 16   ALT 10 9 13   BILITOT 0.32 0.41 0.40   ALKPHOS 95 93 98        I/O (24Hr):   No intake or output data in the 24 hours ending 09/17/21 2015    Glu last 24 hour  Recent Labs     09/17/21  0717 09/17/21  1112 09/17/21  1334 09/17/21  1559   POCGLU 103 221* 239* 255*       No results for input(s): CLARITYU, COLORU, PHUR, SPECGRAV, PROTEINU, RBCUA, BLOODU, BACTERIA, NITRU, WBCUA, LEUKOCYTESUR, YEAST, GLUCOSEU, BILIRUBINUR in the last 72 hours. Social History:  Lives With: Other (comment) (Lives with mother)  Type of Home:  (Condo)  Home Layout: One level  Home Access: Ramped entrance  Bathroom Shower/Tub: Walk-in shower, Doors (Threshold to step over)  Bathroom Toilet: Handicap height  Bathroom Equipment: Grab bars in shower, Built-in shower seat (Pt. reports using wall of shower to get off toilet)  Home Equipment: 4 wheeled walker, Cane, Sock aid  ADL Assistance: [facility] [address]: Needs assistance (Pt. uses motorized cart at the grocery store, sister does grocery shopping typically. 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 sleeps in flat bed at home,  Additional Comments: Pt recently discharged from [address] [place] [address] at [facility] on 8- w/ home health was set up for PT, OT and Speech.  Pt was going to dialysis 3 X/ week. .  Pt is a confused- above information was taken from PT evaluation dated 8-. Pt.'s mother becoming less mobile, pt.'s sister helps out mother. Pt. reports that 2 sisters can assist. Pt.s father in [facility] home. Pt reports that her 2 sisters live close by and that her one sister is retired and able to assist as needed.   Tobacco: none  Alcohol usage:  none  Illicit: none      Family History:       Problem Relation Age of Onset    Diabetes Father     Heart Failure right lower limb off of the chair but able to lift the left against gravity. LIMBS:  Mild edema present in right upper limb and bilateral lower limbs  SKIN:  intact. Principal Diagnosis/plan:  Encephalopathy    He will benefit from intensive interdisciplinary therapies and rehab nursing care and is appropriate for inpatient rehabilitation. The post admission physician evaluation (ANALISA) is consistent with the pre-admission assessment. See above findings to reflect the elements required in the ANALISA. Patient's admitting condition is consistent with the findings of the preadmission assessment by the rehabilitation admissions coordinator. Other Diagnoses/plan:    Ambulatory and ADL dysfunction due to Work with PT/OT/Nsg and to improve on transfers, ambulation,  steps, ADLs, bowel, bladder, monitor skin, monitor for dvt, family training, and close medical monitoring /management by IM/FP as below. 1. Encephalopathy, thought to be secondary to hypoperfusion  2. Recrudescence of right-sided stroke symptoms  3. Anemia  4. Thrombocytopenia  5. AURELIA on CKD, on hemodialysis  6. Recent CVA  7. CAD  8. CHF  9. Atrial fibrillation  10. HTN  11. Type 2 diabetes  12. Obesity  13. IM Consult for med mgt    DVT Prophylaxis:  Eliquis    Estimated Length of Stay:  2-3 weeks.     Prognosis  fair          Yehuda Zazueta MD

## 2021-09-18 NOTE — PLAN OF CARE
Problem: Falls - Risk of:  Goal: Will remain free from falls  Description: Will remain free from falls  9/18/2021 1838 by Alton Razo RN  Outcome: Ongoing  9/18/2021 1113 by Jori Nelson RN  Outcome: Ongoing  Goal: Absence of physical injury  Description: Absence of physical injury  9/18/2021 1838 by Alton Razo RN  Outcome: Ongoing  9/18/2021 1113 by Jori Nelson RN  Outcome: Ongoing     Problem: Skin Integrity:  Goal: Will show no infection signs and symptoms  Description: Will show no infection signs and symptoms  9/18/2021 1838 by Alton Razo RN  Outcome: Ongoing  9/18/2021 1113 by Jori Nelson RN  Outcome: Ongoing  Goal: Absence of new skin breakdown  Description: Absence of new skin breakdown  9/18/2021 1838 by Alton Razo RN  Outcome: Ongoing  9/18/2021 1113 by Jori Nleson RN  Outcome: Ongoing     Problem: SAFETY  Goal: LTG - Patient will demonstrate safety requirements appropriate to situation/environment  9/18/2021 1838 by Alton Razo RN  Outcome: Ongoing  9/18/2021 1113 by Jori Nelson RN  Outcome: Ongoing  Goal: LTG - patient will utilize safety techniques  9/18/2021 1838 by Alton Razo RN  Outcome: Ongoing  9/18/2021 1113 by Jori Nelson RN  Outcome: Ongoing  Goal: STG - patient locks brakes on wheelchair  9/18/2021 1838 by Alton Razo RN  Outcome: Ongoing  9/18/2021 1113 by Jori Nelson RN  Outcome: Ongoing  Goal: STG - Patient uses call light consistently to request assistance with transfers  9/18/2021 1838 by Alton Razo RN  Outcome: Ongoing  9/18/2021 1113 by Jori Nelson RN  Outcome: Ongoing     Problem: Musculor/Skeletal Functional Status  Goal: Highest potential functional level  Outcome: Ongoing  Goal: Absence of falls  Outcome: Ongoing     Problem: Pain:  Goal: Pain level will decrease  Description: Pain level will decrease  Outcome: Ongoing  Goal: Control of acute pain  Description: Control of acute pain  Outcome: Ongoing  Goal: Control of chronic pain  Description: Control of chronic pain  Outcome: Ongoing

## 2021-09-18 NOTE — PROGRESS NOTES
Physical Medicine & Rehabilitation  Progress Note    9/18/2021 9:33 AM     Subjective:   Pt feels well this AM , slept well, denies any compllints , working with speech while she was seen , B/B ok    ROS:  Denies fevers, chills, sweats. No chest pain, palpitations, lightheadedness. Denies coughing, wheezing or shortness of breath. Denies abdominal pain, nausea, diarrhea or constipation. No new areas of joint pain. Denies new areas of numbness or weakness. Denies new anxiety or depression issues. No new skin problems.     Rehabilitation:     PT    Restrictions/Precautions: Fall Risk  Other position/activity restrictions: up w/ assist  Required Braces or Orthoses  Right Lower Extremity Brace:  (Lymphedema wraps)  Left Lower Extremity Brace:  (Lymphedema wraps)   Transfers  Sit to Stand: Supervision  Stand to sit: Supervision  Bed to Chair: Supervision  Stand Pivot Transfers: Supervision  Comment: pt stood next to the bed for less than 1 minute- pt's right knee buckling w/ standing- Pt fearful of falling and C/O weakness and heaviness of right LE  Ambulation 1  Surface: level tile  Device: Rolling Walker  Assistance: Stand by assistance  Quality of Gait: slight foward flexed posture with short step lengthb   Gait Deviations: Slow Nanie, Increased ELISABET, Decreased step length, Decreased step height  Distance: 10ft from bed to toilet; 20ft from toilet to bedside chair  Comments: patient less fearful this PM. Able to amb. distanvce with no buckeling of R knee      Transfers  Sit to Stand: Supervision  Stand to sit: Supervision  Bed to Chair: Supervision  Stand Pivot Transfers: Supervision  Comment: pt stood next to the bed for less than 1 minute- pt's right knee buckling w/ standing- Pt fearful of falling and C/O weakness and heaviness of right LE  Ambulation  Ambulation?: Yes  Ambulation 1  Surface: level tile  Device: Rolling Walker  Assistance: Stand by assistance  Quality of Gait: slight foward flexed posture with short step lengthb   Gait Deviations: Slow Annie, Increased ELISABET, Decreased step length, Decreased step height  Distance: 10ft from bed to toilet; 20ft from toilet to bedside chair  Comments: patient less fearful this PM. Able to amb. distanvce with no buckeling of R knee      Surface: level tile  Ambulation 1  Surface: level tile  Device: Rolling Walker  Assistance: Stand by assistance  Quality of Gait: slight foward flexed posture with short step lengthb   Gait Deviations: Slow Annie, Increased ELISABET, Decreased step length, Decreased step height  Distance: 10ft from bed to toilet; 20ft from toilet to bedside chair  Comments: patient less fearful this PM. Able to amb. distanvce with no buckeling of R knee                     OT:      ADL  Feeding: Contact guard assistance  Grooming: Stand by assistance  UE Bathing: Moderate assistance  LE Bathing: Maximum assistance  UE Dressing: Minimal assistance  LE Dressing: Minimal assistance  Toileting: Dependent/Total  Additional Comments: ADL scores based on clinical reasoning and skilled observation unless otherwise noted. Pt was able to maria de jesus/doff pants only requiring vc for safety due to trying to stand to pull up pants while standing on the legs of the pants. pt doff/maria de jesus gown requiring assistance with ties and buttons on sleeves.           Balance  Sitting Balance: Contact guard assistance (CGA-SBA)  Standing Balance: Contact guard assistance   Standing Balance  Time: ~1min  Activity: standing from recliner to maria de jesus/doff pants  Comment: with RW and mod vc for safety  Functional Mobility  Functional Mobility Comments: Functional mobility deferred due to weakness and fear of falling  Bed mobility  Rolling to Left: Supervision  Rolling to Right: Supervision  Supine to Sit: Supervision  Sit to Supine: Unable to assess  Scooting: Supervision  Comment: Pt required increased time to complete bed mobility. Pt reports that right side was feeling heavy.  Pt sat EOB with CGA-SBA due to increased dizziness/feeling foggy. Transfers  Sit to stand: 2 Person assistance, Moderate assistance  Stand to sit: 2 Person assistance, Moderate assistance  Transfer Comments: Verbal cues for hand placement and safety. Pt demonstrated right knee buckling. Pt demonstrated increased anxiety/fear of falling while standing.          ST    Attention: functional throughout     Orientation: n/a     Recall: Pt recalling writer and other ST that saw her yesterday along with specific tasks she had difficulty with. No family present in room. Pt actively using \"memory book\" in session to recall previous day's events/important information, etc.  Pt. Requesting assistance from ST in recalling specific details to add to her book. Pt ed provided re use of external memory aides to facilitate recall. Pt verbalized understanding of all recommendations.       Other: Word finding:   Generative namin members of concrete category (no time constraint) 100%. Opposittes- 88%, 100% c cues. Dual meanings- 80%, 90% c cues. Pt. Demonstrated very long response latency across all tasks. Objective:  BP (!) 129/47   Pulse 60   Temp 98 °F (36.7 °C) (Oral)   Resp 16   Ht 5' 5\" (1.651 m)   Wt 246 lb 11.2 oz (111.9 kg)   SpO2 96%   BMI 41.05 kg/m²   Alert, no distress. Good speech and language function. Lungs clear. Heart regular. Abdomen non-distended, non-tender. EXTREMITIES:  PROM within functional limits. No calf tenderness, edema. Feet warm. NEURO: Alert and oriented.  difficulties with memory and recall. Speech fluent with some expressive aphasia/anomia.  No facial droop.  Symmetrical shoulder shrug.  Midline tongue protrusion.  Sensation to light touch decreased in right upper and lower limbs compared to left.  Hypersensitivity noted by patient with sensation testing in the distal right lower limb. MSK:  Muscle bulk is normal bilaterally.  Strength 4/5 in right upper limb and 5/5 in left upper Fingerstick    Collection Time: 09/16/21  7:21 AM   Result Value Ref Range    POC Glucose 158 (H) 65 - 105 mg/dL   POC Glucose Fingerstick    Collection Time: 09/16/21 11:07 AM   Result Value Ref Range    POC Glucose 311 (H) 65 - 105 mg/dL   POC Glucose Fingerstick    Collection Time: 09/16/21  4:01 PM   Result Value Ref Range    POC Glucose 289 (H) 65 - 105 mg/dL   POC Glucose Fingerstick    Collection Time: 09/16/21  8:18 PM   Result Value Ref Range    POC Glucose 204 (H) 65 - 105 mg/dL   CBC    Collection Time: 09/17/21  5:56 AM   Result Value Ref Range    WBC 4.6 3.5 - 11.0 k/uL    RBC 3.53 (L) 4.0 - 5.2 m/uL    Hemoglobin 11.1 (L) 12.0 - 16.0 g/dL    Hematocrit 33.7 (L) 36 - 46 %    MCV 95.6 80 - 100 fL    MCH 31.6 26 - 34 pg    MCHC 33.0 31 - 37 g/dL    RDW 14.9 11.5 - 14.9 %    Platelets 134 (L) 081 - 450 k/uL    MPV 8.5 6.0 - 12.0 fL    NRBC Automated NOT REPORTED per 100 WBC   Comprehensive Metabolic Panel    Collection Time: 09/17/21  5:56 AM   Result Value Ref Range    Glucose 111 (H) 70 - 99 mg/dL    BUN 26 (H) 8 - 23 mg/dL    CREATININE 2.64 (H) 0.50 - 0.90 mg/dL    Bun/Cre Ratio NOT REPORTED 9 - 20    Calcium 9.2 8.6 - 10.4 mg/dL    Sodium 142 135 - 144 mmol/L    Potassium 4.2 3.7 - 5.3 mmol/L    Chloride 105 98 - 107 mmol/L    CO2 28 20 - 31 mmol/L    Anion Gap 9 9 - 17 mmol/L    Alkaline Phosphatase 98 35 - 104 U/L    ALT 13 5 - 33 U/L    AST 16 <32 U/L    Total Bilirubin 0.40 0.3 - 1.2 mg/dL    Total Protein 6.4 6.4 - 8.3 g/dL    Albumin 3.4 (L) 3.5 - 5.2 g/dL    Albumin/Globulin Ratio NOT REPORTED 1.0 - 2.5    GFR Non- 18 (L) >60 mL/min    GFR  22 (L) >60 mL/min    GFR Comment          GFR Staging NOT REPORTED    POC Glucose Fingerstick    Collection Time: 09/17/21  7:17 AM   Result Value Ref Range    POC Glucose 103 65 - 105 mg/dL   POC Glucose Fingerstick    Collection Time: 09/17/21 11:12 AM   Result Value Ref Range    POC Glucose 221 (H) 65 - 105 mg/dL   POC Glucose Fingerstick    Collection Time: 09/17/21  1:34 PM   Result Value Ref Range    POC Glucose 239 (H) 65 - 105 mg/dL   POC Glucose Fingerstick    Collection Time: 09/17/21  3:59 PM   Result Value Ref Range    POC Glucose 255 (H) 65 - 105 mg/dL   POC Glucose Fingerstick    Collection Time: 09/17/21 10:31 PM   Result Value Ref Range    POC Glucose 259 (H) 65 - 105 mg/dL   POC Glucose Fingerstick    Collection Time: 09/18/21  6:13 AM   Result Value Ref Range    POC Glucose 110 (H) 65 - 105 mg/dL   Basic Metabolic Panel w/ Reflex to MG    Collection Time: 09/18/21  6:42 AM   Result Value Ref Range    Glucose 101 (H) 70 - 99 mg/dL    BUN 20 8 - 23 mg/dL    CREATININE 2.23 (H) 0.50 - 0.90 mg/dL    Bun/Cre Ratio NOT REPORTED 9 - 20    Calcium 9.6 8.6 - 10.4 mg/dL    Sodium 138 135 - 144 mmol/L    Potassium 4.3 3.7 - 5.3 mmol/L    Chloride 101 98 - 107 mmol/L    CO2 28 20 - 31 mmol/L    Anion Gap 9 9 - 17 mmol/L    GFR Non-African American 22 (L) >60 mL/min    GFR  27 (L) >60 mL/min    GFR Comment          GFR Staging NOT REPORTED    CBC    Collection Time: 09/18/21  6:42 AM   Result Value Ref Range    WBC 4.5 3.5 - 11.0 k/uL    RBC 3.52 (L) 4.0 - 5.2 m/uL    Hemoglobin 11.3 (L) 12.0 - 16.0 g/dL    Hematocrit 33.5 (L) 36 - 46 %    MCV 95.3 80 - 100 fL    MCH 32.2 26 - 34 pg    MCHC 33.8 31 - 37 g/dL    RDW 14.7 11.5 - 14.9 %    Platelets 605 (L) 848 - 450 k/uL    MPV 8.7 6.0 - 12.0 fL    NRBC Automated NOT REPORTED per 100 WBC       Impression/Plan:    1. Encephalopathy, thought to be secondary to hypoperfusion  2. Recrudescence of right-sided stroke symptoms  3. Anemia  4. Thrombocytopenia  5. AURELIA on CKD, on hemodialysis  6. Recent CVA  7. CAD  8. CHF  9. Atrial fibrillation  10. HTN  11. Type 2 diabetes  12. Obesity  13. IM Consult for med mgt  15.  DVT prophylaxis, on Kofi Jimenez MD

## 2021-09-19 PROBLEM — R53.81 DEBILITY: Status: ACTIVE | Noted: 2021-09-19

## 2021-09-19 LAB
GLUCOSE BLD-MCNC: 100 MG/DL (ref 65–105)
GLUCOSE BLD-MCNC: 204 MG/DL (ref 65–105)
GLUCOSE BLD-MCNC: 244 MG/DL (ref 65–105)
GLUCOSE BLD-MCNC: 296 MG/DL (ref 65–105)

## 2021-09-19 PROCEDURE — 99254 IP/OBS CNSLTJ NEW/EST MOD 60: CPT | Performed by: FAMILY MEDICINE

## 2021-09-19 PROCEDURE — 1180000000 HC REHAB R&B

## 2021-09-19 PROCEDURE — 97110 THERAPEUTIC EXERCISES: CPT

## 2021-09-19 PROCEDURE — 6370000000 HC RX 637 (ALT 250 FOR IP): Performed by: FAMILY MEDICINE

## 2021-09-19 PROCEDURE — 97116 GAIT TRAINING THERAPY: CPT

## 2021-09-19 PROCEDURE — 97530 THERAPEUTIC ACTIVITIES: CPT

## 2021-09-19 PROCEDURE — 82947 ASSAY GLUCOSE BLOOD QUANT: CPT

## 2021-09-19 PROCEDURE — 97535 SELF CARE MNGMENT TRAINING: CPT

## 2021-09-19 RX ADMIN — RANOLAZINE 1000 MG: 1000 TABLET, FILM COATED, EXTENDED RELEASE ORAL at 20:41

## 2021-09-19 RX ADMIN — FEBUXOSTAT 40 MG: 40 TABLET, FILM COATED ORAL at 09:41

## 2021-09-19 RX ADMIN — INSULIN LISPRO 4 UNITS: 100 INJECTION, SOLUTION INTRAVENOUS; SUBCUTANEOUS at 16:29

## 2021-09-19 RX ADMIN — BUSPIRONE HYDROCHLORIDE 7.5 MG: 7.5 TABLET ORAL at 09:41

## 2021-09-19 RX ADMIN — INSULIN LISPRO 4 UNITS: 100 INJECTION, SOLUTION INTRAVENOUS; SUBCUTANEOUS at 11:27

## 2021-09-19 RX ADMIN — FUROSEMIDE 80 MG: 40 TABLET ORAL at 09:40

## 2021-09-19 RX ADMIN — PANTOPRAZOLE SODIUM 40 MG: 40 TABLET, DELAYED RELEASE ORAL at 06:26

## 2021-09-19 RX ADMIN — BUSPIRONE HYDROCHLORIDE 7.5 MG: 7.5 TABLET ORAL at 15:10

## 2021-09-19 RX ADMIN — APIXABAN 5 MG: 5 TABLET, FILM COATED ORAL at 20:35

## 2021-09-19 RX ADMIN — FUROSEMIDE 80 MG: 40 TABLET ORAL at 15:09

## 2021-09-19 RX ADMIN — BUSPIRONE HYDROCHLORIDE 7.5 MG: 7.5 TABLET ORAL at 20:42

## 2021-09-19 RX ADMIN — ATORVASTATIN CALCIUM 80 MG: 80 TABLET, FILM COATED ORAL at 09:41

## 2021-09-19 RX ADMIN — RANOLAZINE 1000 MG: 1000 TABLET, FILM COATED, EXTENDED RELEASE ORAL at 09:41

## 2021-09-19 RX ADMIN — APIXABAN 5 MG: 5 TABLET, FILM COATED ORAL at 09:41

## 2021-09-19 RX ADMIN — GABAPENTIN 300 MG: 300 CAPSULE ORAL at 20:18

## 2021-09-19 RX ADMIN — INSULIN LISPRO 4 UNITS: 100 INJECTION, SOLUTION INTRAVENOUS; SUBCUTANEOUS at 20:20

## 2021-09-19 RX ADMIN — TAMSULOSIN HYDROCHLORIDE 0.4 MG: 0.4 CAPSULE ORAL at 09:41

## 2021-09-19 RX ADMIN — INSULIN GLARGINE 53 UNITS: 100 INJECTION, SOLUTION SUBCUTANEOUS at 20:19

## 2021-09-19 RX ADMIN — CARVEDILOL 6.25 MG: 6.25 TABLET, FILM COATED ORAL at 09:41

## 2021-09-19 RX ADMIN — TRAZODONE HYDROCHLORIDE 75 MG: 150 TABLET ORAL at 20:34

## 2021-09-19 RX ADMIN — ASPIRIN 81 MG CHEWABLE TABLET 81 MG: 81 TABLET CHEWABLE at 09:41

## 2021-09-19 RX ADMIN — TRAZODONE HYDROCHLORIDE 75 MG: 150 TABLET ORAL at 20:18

## 2021-09-19 RX ADMIN — CARVEDILOL 6.25 MG: 6.25 TABLET, FILM COATED ORAL at 20:18

## 2021-09-19 RX ADMIN — GABAPENTIN 300 MG: 300 CAPSULE ORAL at 09:41

## 2021-09-19 ASSESSMENT — PAIN SCALES - GENERAL
PAINLEVEL_OUTOF10: 0
PAINLEVEL_OUTOF10: 0

## 2021-09-19 NOTE — PLAN OF CARE
Problem: Falls - Risk of:  Goal: Will remain free from falls  Description: Will remain free from falls  9/19/2021 [id number] by Earnestine Wang RN  Outcome: Ongoing  9/19/2021 0016 by Pito Freeman RN  Outcome: Ongoing  Note: Patient remains free from falls at this time. Patient is aware of her limitations, and uses the call light before ambulation. 9/18/2021 1838 by Earnestine Wang RN  Outcome: Ongoing  Goal: Absence of physical injury  Description: Absence of physical injury  9/19/2021 0621 by Earnestine Wang RN  Outcome: Ongoing  9/19/2021 0016 by Pito Freeman RN  Outcome: Ongoing  9/18/2021 1838 by Earnestine Wang RN  Outcome: Ongoing     Problem: Skin Integrity:  Goal: Will show no infection signs and symptoms  Description: Will show no infection signs and symptoms  9/19/2021 0621 by Earnestine Wang RN  Outcome: Ongoing  9/19/2021 0016 by Pito Freeman RN  Outcome: Ongoing  Note: Patient shows no signs or symptoms of infection at this time.    9/18/2021 1838 by Earnestine Wang RN  Outcome: Ongoing  Goal: Absence of new skin breakdown  Description: Absence of new skin breakdown  9/19/2021 0621 by Earnestine Wang RN  Outcome: Ongoing  9/19/2021 0016 by Pito Freeman RN  Outcome: Ongoing  9/18/2021 1838 by Earnestine Wang RN  Outcome: Ongoing     Problem: SAFETY  Goal: LTG - Patient will demonstrate safety requirements appropriate to situation/environment  9/19/2021 0621 by Earnestine Wang RN  Outcome: Ongoing  9/19/2021 0016 by Pito Freeman RN  Outcome: Ongoing  9/18/2021 1838 by Earnestine Wang RN  Outcome: Ongoing  Goal: LTG - patient will utilize safety techniques  9/19/2021 0621 by Earnestine Wang RN  Outcome: Ongoing  9/19/2021 0016 by Pito Freeman RN  Outcome: Ongoing  9/18/2021 1838 by Earnestine Wang RN  Outcome: Ongoing  Goal: STG - patient locks brakes on wheelchair  9/19/2021 [id number] by Earnestine Wang RN  Outcome: Ongoing  9/19/2021 0016 by Pito Freeman RN  Outcome: Ongoing  9/18/2021 1838 by Greg Fountain RN  Outcome: Ongoing  Goal: STG - Patient uses call light consistently to request assistance with transfers  9/19/2021 0621 by Greg Fountain RN  Outcome: Ongoing  9/19/2021 0016 by Mey Echeverria RN  Outcome: Ongoing  9/18/2021 1838 by Greg Fountain RN  Outcome: Ongoing     Problem: Musculor/Skeletal Functional Status  Goal: Highest potential functional level  9/19/2021 0621 by Greg Fountain RN  Outcome: Ongoing  9/19/2021 0016 by Mey Echeverria RN  Outcome: Ongoing  9/18/2021 1838 by Greg Fountain RN  Outcome: Ongoing  Goal: Absence of falls  9/19/2021 0621 by Greg Fountain RN  Outcome: Ongoing  9/19/2021 0016 by Mey Echeverria RN  Outcome: Ongoing  9/18/2021 1838 by Greg Fountain RN  Outcome: Ongoing     Problem: Pain:  Goal: Pain level will decrease  Description: Pain level will decrease  9/19/2021 0621 by Greg Fountain RN  Outcome: Ongoing  9/19/2021 0016 by Mey Echeverria RN  Outcome: Ongoing  9/18/2021 1838 by Greg Fountain RN  Outcome: Ongoing  Goal: Control of acute pain  Description: Control of acute pain  9/19/2021 0621 by Greg Fountain RN  Outcome: Ongoing  9/19/2021 0016 by Mey Echeverria RN  Outcome: Ongoing  9/18/2021 1838 by Greg Fountain RN  Outcome: Ongoing  Goal: Control of chronic pain  Description: Control of chronic pain  9/19/2021 0621 by Greg Fountain RN  Outcome: Ongoing  9/19/2021 0016 by Mey Echeverria RN  Outcome: Ongoing  9/18/2021 1838 by Greg Fountain RN  Outcome: Ongoing

## 2021-09-19 NOTE — CONSULTS
Comprehensive Nutrition Assessment    Type and Reason for Visit:  Initial, Consult (Evaluate and Treat)    Nutrition Recommendations/Plan: Continue current diet    Nutrition Assessment:  Pt admitted to rehab with diagnosis of encephalopathy. Pt states she has been eating well, although concerned about unwanted wt gain; thinks some of gain due to fluid. States she has not been eating additional snacks or outside foods. Current diet to continue. Malnutrition Assessment:  Malnutrition Status:  No malnutrition    Context:  Acute Illness     Findings of the 6 clinical characteristics of malnutrition:  Energy Intake:  No significant decrease in energy intake  Weight Loss:  No significant weight loss     Body Fat Loss:  No significant body fat loss     Muscle Mass Loss:  No significant muscle mass loss    Fluid Accumulation:  1 - Mild (likely not related to nutritional status) Extremities   Strength:  Not Performed    Estimated Daily Nutrient Needs:  Energy (kcal):  6085-2695 based on Burt-St Jeor with 1.1-1.2 factor; Weight Used for Energy Requirements:  Usual     Protein (g):  80-91 based on 1.4-1.6 gm per kg; Weight Used for Protein Requirements:  Ideal          Nutrition Related Findings:  Hx: recent CVA, AURELIA on CKD (on hemodialysis), CAD, CHF, atrial fibrillation, HTN, type 2 diabetes. POC Glucose: 100-336 since admission. K: 4.3 (9/18), Phosphorus: 2.5 (9/15), 5.0 (8/20). Lantus, insulin sliding scale. Edema: +2 RLE, +1 LLE. Wounds:  Skin Tears       Current Nutrition Therapies:    ADULT DIET; Regular; 4 carb choices (60 gm/meal);  Low Sodium (2 gm)    Anthropometric Measures:  · Height: 5' 5\" (165.1 cm)  · Current Body Weight: 244 lb 7.8 oz (110.9 kg)   · Admission Body Weight: 246 lb 11.1 oz (111.9 kg)    · Usual Body Weight: 233 lb 11 oz (106 kg) (8/25)     · Ideal Body Weight: 125 lbs; % Ideal Body Weight 195.6 %   · BMI: 40.7  · BMI Categories: Obese Class 3 (BMI 40.0 or greater) Nutrition Diagnosis:   · Altered nutrition-related lab values related to endocrine dysfuntion as evidenced by lab values    Nutrition Interventions:   Food and/or Nutrient Delivery:  Continue Current Diet  Nutrition Education/Counseling:  No recommendation at this time (Education previously provided)     Goals:  Improvement in glucose control       Nutrition Monitoring and Evaluation:   Behavioral-Environmental Outcomes:  None Identified   Food/Nutrient Intake Outcomes:  Food and Nutrient Intake  Physical Signs/Symptoms Outcomes:  Biochemical Data, GI Status, Fluid Status or Edema, Weight, Skin     Discharge Planning:    Continue current diet     Some areas of assessment may be incomplete due to standard COVID-19 Precautions. Suzanne Germain R.D., L.D.   Phone: [phone]

## 2021-09-19 NOTE — PLAN OF CARE
Nutrition Problem #1: Altered nutrition-related lab values  Intervention: Food and/or Nutrient Delivery: Continue Current Diet  Nutritional Goals: Improvement in glucose control

## 2021-09-19 NOTE — PROGRESS NOTES
Physical Medicine & Rehabilitation  Progress Note    9/19/2021 10:36 AM     Subjective:   Pt feels well this AM , slept well, denies any complaints  , B/B ok    ROS:  Denies fevers, chills, sweats. No chest pain, palpitations, lightheadedness. Denies coughing, wheezing or shortness of breath. Denies abdominal pain, nausea, diarrhea or constipation. No new areas of joint pain. Denies new areas of numbness or weakness. Denies new anxiety or depression issues. No new skin problems. Rehabilitation:     PT    Bed mobility  Comment: (P) Unable to assess as patient was in chair upon arrival.  Transfers  Sit to Stand: Stand by assistance  Stand to sit: Stand by assistance  Comment: Transfers completed with B UE support on RW. Sit<>stand completed 4x from various seat heights. Ambulation  Ambulation?: Yes  More Ambulation?: No  Ambulation 1  Surface: level tile  Device: Rolling Walker  Assistance: Stand by assistance;Contact guard assistance  Quality of Gait: slight foward flexed posture with short step length, increase fatique noted with distance   Gait Deviations: Slow Annie; Increased ELISABET; Decreased step length;Decreased step height  Distance: 35ft+40ft+15ftx2  Comments: 1 Seated rest break required due to heaviness of R LE  Stairs/Curb  Stairs?: No  Balance  Posture: Fair  Sitting - Static: Good  Sitting - Dynamic: Fair;+  Standing - Static: Good (RW)  Standing - Dynamic: Fair;+ (RW)  Comments: Standing balance completed with rollator. Other exercises  Other exercises 1: Seated B LE exercises AROM 15-20x   Patient deferred further activities due to fatigue    OT      Cognition  Attention Span: Difficulty dividing attention  Memory: Decreased long term memory  Following Commands: Follows one step commands consistently  Safety Judgement: Good awareness of safety precautions  Insights: Fully aware of deficits  Cognition  Cognition Comment: per ST pt with fair short term memory but having LT memory deficits ie. asks \"What is a flashlight? \"  some word finding deficits. pt indep initiates writing new info into journal today to assist with her memory. Sensation  Overall Sensation Status: Impaired (numb B feet, calves, B hands due to diabetic neuropathy)      UE Function  Hand Dominance  Hand Dominance: Right  LUE Strength  Gross LUE Strength: WFL  Left Hand Strength -  (lbs)  Handle Setting 2: 35  LUE Tone: Normotonic  LUE AROM (degrees)  LUE AROM : WFL  RUE Strength  Gross RUE Strength: Exceptions to [facility]  R Shoulder Flex: 3-/5  R Elbow Flex: 3+/5  R Elbow Ext: 4-/5  R Wrist Flex: 3/5  R Wrist Ext: 3+/5   Right Hand Strength -  (lbs)  Handle Setting 2: 15  RUE Tone: Normotonic  RUE AROM (degrees)  RUE AROM : Exceptions  R Shoulder Flexion 0-180: 0-80 AROM, 0-110 PROM  R Forearm Supination  0-90: pt able to achieve full AROM but only after tactile cue- pt can self provide.   Left 9 Hole Peg Test Time (secs): 29  Right 9 Hole Peg Test Time (secs): 32        Fine Motor Skills  Coordination  Movements Are Fluid And Coordinated: No  Coordination and Movement description: Fine motor impairments, Gross motor impairments, Right UE, Decreased speed, Decreased accuracy   Comment: pt describes poor coordination to cut food and needing to feed self with nondominant LUE   Left Hand Strength - Pinch (lbs)  Lateral: 8  Palmar 3 point: 6  Right Hand Strength - Pinch (lbs)  Lateral: 4  Palmar 3 point: 4  Quality of Movement Other  Comment: pt describes poor coordination to cut food and needing to feed self with nondominant LUE     Mobility          Balance  Sitting Balance: Independent (on tub bench)  Standing Balance: Contact guard assistance  Standing Balance  Time: 5 min , 3 min 1st am treat, 6 min x 2, 4 min x 2 and 2 min x 2 2nd am treat  Activity: adls with walker  Functional Mobility  Functional - Mobility Device: Rolling Walker  Activity: To/from bathroom  Assist Level: Contact guard assistance  Functional Mobility Comments: ambulate 13 feet to bathroom, then stand step transfers due to fatigue. Transfers  Stand Step Transfers: Contact guard assistance  Stand Pivot Transfers: Contact guard assistance  Sit to stand: Contact guard assistance  Stand to sit: Contact guard assistance  Transfer Comments: RW      Activity Tolerance: Patient limited by fatigue  Activity Tolerance: pt on room air with sats at 90.  yesterday pt on 1 L.       ADL  ADL  Feeding: Setup (pt describes poor coordination to cut food and needing to feed self with nondominant LUE)  Grooming: Setup  UE Bathing: Setup  LE Bathing: Moderate assistance (assist to wash B feet and bottom)  UE Dressing: Setup (t shirt)  LE Dressing: Moderate assistance (min assist pants, TA teds, min socks with sock aide (has at home))  Toileting: Minimal assistance (post BM to wipe bottom)  Additional Comments: pt completed toileting and brush teeth 1st treatment and shower and dress 2nd adl treatment. pt is unable to reach B feet for adls and most recently was using equipment at home for this. BLE with mod edema worse on L. per RN ok to don tigre hose, tigre hose were donned.   OT scores      Eating  Assistance Needed: Setup or clean-up assistance  Comment: using non dominant LUE  CARE Score: 5  Discharge Goal: Independent  Oral Hygiene  Assistance Needed: Setup or clean-up assistance  CARE Score: 5  Discharge Goal: Independent  Toileting Hygiene  Assistance Needed: Partial/moderate assistance  CARE Score: 3  Discharge Goal: Independent  Shower/Bathe Self  Assistance Needed: Partial/moderate assistance  CARE Score: 3  Discharge Goal: Independent  Upper Body Dressing  Assistance Needed: Setup or clean-up assistance  CARE Score: 5  Discharge Goal: Independent  Lower Body Dressing  Assistance Needed: Partial/moderate assistance  CARE Score: 3  Discharge Goal: Independent  Putting On/Taking Off Footwear  Assistance Needed: Partial/moderate assistance  CARE Score: 3  Discharge Goal: Independent  Toilet Transfer  Assistance Needed: Supervision or touching assistance  CARE Score: 4  Discharge Goal: Independent  Goals  Patient Goals   Patient goals : return home safe and indep with self care  Short term goals  Time Frame for Short term goals: 1 week  Short term goal 1: pt to use BUE to cut food successfully and feed self with RUE mod indep. Short term goal 2: SBA ambulate to obtain set up for adls. Short term goal 3: SBA LE bathe and dress, able to don compression stockings with own device with min assist  Short term goal 4: ilana 9-11 min stand, ambulate with SBA and good safety for adls  Short term goal 5: ilana 10 reps x 3 R shld AROM achieving 95 degrees, ilana 10 reps x 3 R hand  strengthening. Long term goals  Time Frame for Long term goals : by discharge  Long term goal 1: mod indep ambulate to obtain set up for adls (4 [employer])  Long term goal 2: mod indep self care and toileting  Long term goal 3: tolerate 15-17 min stand, ambulate for adls mod indep with good safety. Long term goal 4: indep RUE HEP  Long term goal 5: increase R shoulder AROM to 100 and MMT to 3+ and R  to 20 pounds to improve use of RUE for adls.       ST    Attention: functional throughout     Recall/Problem solving: Differences/similarities between 2 objects- 90%, 100% c cues. Pt. Utilizing word finding and memory strategies (circumlocution and association) throughout task [place].       Other: Word finding:   Generative namin members of concrete category (no time constraint) 38%, 88% c cues. Pt. Demonstrated  long response latency across all tasks.       Pt. Sister on Facetime and other sister and niece were present in room and were educ. Re: pt. Word finding and memory performance. ST also educ on pt.  Long term vs short term memory difficulties.         Objective:  BP (!) 132/53   Pulse 65   Temp 97.5 °F (36.4 °C) (Oral)   Resp 18   Ht 5' 5\" (1.651 m)   Wt 244 lb 9.6 oz (110.9 kg)   SpO2 95%   BMI 40.70 kg/m²   Alert, no distress. Good speech and language function. Lungs clear. Heart regular. Abdomen non-distended, non-tender. EXTREMITIES:  PROM within functional limits. No calf tenderness, edema. Feet warm. NEURO: Alert and oriented.  difficulties with memory and recall. Speech fluent with some expressive aphasia/anomia.  No facial droop.  Symmetrical shoulder shrug.  Midline tongue protrusion.  Sensation to light touch decreased in right upper and lower limbs compared to left.  Hypersensitivity noted by patient with sensation testing in the distal right lower limb. MSK:  Muscle bulk is normal bilaterally. Strength 4/5 in right upper limb and 5/5 in left upper limb.  Strength 4+/5 with bilateral ankle dorsiflexion and plantarflexion.  Has difficulty lifting the right lower limb off of the chair but able to lift the left against gravity. LIMBS:  Mild edema present in right upper limb and bilateral lower limbs  SKIN:  intact. No calf tenderness or edema.       Diagnostics:   Recent Results (from the past 72 hour(s))   POC Glucose Fingerstick    Collection Time: 09/16/21 11:07 AM   Result Value Ref Range    POC Glucose 311 (H) 65 - 105 mg/dL   POC Glucose Fingerstick    Collection Time: 09/16/21  4:01 PM   Result Value Ref Range    POC Glucose 289 (H) 65 - 105 mg/dL   POC Glucose Fingerstick    Collection Time: 09/16/21  8:18 PM   Result Value Ref Range    POC Glucose 204 (H) 65 - 105 mg/dL   CBC    Collection Time: 09/17/21  5:56 AM   Result Value Ref Range    WBC 4.6 3.5 - 11.0 k/uL    RBC 3.53 (L) 4.0 - 5.2 m/uL    Hemoglobin 11.1 (L) 12.0 - 16.0 g/dL    Hematocrit 33.7 (L) 36 - 46 %    MCV 95.6 80 - 100 fL    MCH 31.6 26 - 34 pg    MCHC 33.0 31 - 37 g/dL    RDW 14.9 11.5 - 14.9 %    Platelets 196 (L) 226 - 450 k/uL    MPV 8.5 6.0 - 12.0 fL    NRBC Automated NOT REPORTED per 100 WBC   Comprehensive Metabolic Panel    Collection Time: 09/17/21  5:56 AM   Result Value Ref Range    Glucose 111 (H) 70 - 99 mg/dL    BUN 26 (H) 8 - 23 mg/dL    CREATININE 2.64 (H) 0.50 - 0.90 mg/dL    Bun/Cre Ratio NOT REPORTED 9 - 20    Calcium 9.2 8.6 - 10.4 mg/dL    Sodium 142 135 - 144 mmol/L    Potassium 4.2 3.7 - 5.3 mmol/L    Chloride 105 98 - 107 mmol/L    CO2 28 20 - 31 mmol/L    Anion Gap 9 9 - 17 mmol/L    Alkaline Phosphatase 98 35 - 104 U/L    ALT 13 5 - 33 U/L    AST 16 <32 U/L    Total Bilirubin 0.40 0.3 - 1.2 mg/dL    Total Protein 6.4 6.4 - 8.3 g/dL    Albumin 3.4 (L) 3.5 - 5.2 g/dL    Albumin/Globulin Ratio NOT REPORTED 1.0 - 2.5    GFR Non- 18 (L) >60 mL/min    GFR  22 (L) >60 mL/min    GFR Comment          GFR Staging NOT REPORTED    POC Glucose Fingerstick    Collection Time: 09/17/21  7:17 AM   Result Value Ref Range    POC Glucose 103 65 - 105 mg/dL   POC Glucose Fingerstick    Collection Time: 09/17/21 11:12 AM   Result Value Ref Range    POC Glucose 221 (H) 65 - 105 mg/dL   POC Glucose Fingerstick    Collection Time: 09/17/21  1:34 PM   Result Value Ref Range    POC Glucose 239 (H) 65 - 105 mg/dL   POC Glucose Fingerstick    Collection Time: 09/17/21  3:59 PM   Result Value Ref Range    POC Glucose 255 (H) 65 - 105 mg/dL   POC Glucose Fingerstick    Collection Time: 09/17/21 10:31 PM   Result Value Ref Range    POC Glucose 259 (H) 65 - 105 mg/dL   POC Glucose Fingerstick    Collection Time: 09/18/21  6:13 AM   Result Value Ref Range    POC Glucose 110 (H) 65 - 105 mg/dL   Basic Metabolic Panel w/ Reflex to MG    Collection Time: 09/18/21  6:42 AM   Result Value Ref Range    Glucose 101 (H) 70 - 99 mg/dL    BUN 20 8 - 23 mg/dL    CREATININE 2.23 (H) 0.50 - 0.90 mg/dL    Bun/Cre Ratio NOT REPORTED 9 - 20    Calcium 9.6 8.6 - 10.4 mg/dL    Sodium 138 135 - 144 mmol/L    Potassium 4.3 3.7 - 5.3 mmol/L    Chloride 101 98 - 107 mmol/L    CO2 28 20 - 31 mmol/L    Anion Gap 9 9 - 17 mmol/L    GFR Non-African American 22 (L) >60 mL/min    GFR  27 (L) >60 mL/min    GFR Comment          GFR Staging NOT REPORTED    CBC    Collection Time: 09/18/21  6:42 AM   Result Value Ref Range    WBC 4.5 3.5 - 11.0 k/uL    RBC 3.52 (L) 4.0 - 5.2 m/uL    Hemoglobin 11.3 (L) 12.0 - 16.0 g/dL    Hematocrit 33.5 (L) 36 - 46 %    MCV 95.3 80 - 100 fL    MCH 32.2 26 - 34 pg    MCHC 33.8 31 - 37 g/dL    RDW 14.7 11.5 - 14.9 %    Platelets 352 (L) 389 - 450 k/uL    MPV 8.7 6.0 - 12.0 fL    NRBC Automated NOT REPORTED per 100 WBC   POC Glucose Fingerstick    Collection Time: 09/18/21 10:59 AM   Result Value Ref Range    POC Glucose 336 (H) 65 - 105 mg/dL   POC Glucose Fingerstick    Collection Time: 09/18/21  4:02 PM   Result Value Ref Range    POC Glucose 274 (H) 65 - 105 mg/dL   POC Glucose Fingerstick    Collection Time: 09/18/21  8:21 PM   Result Value Ref Range    POC Glucose 234 (H) 65 - 105 mg/dL   POC Glucose Fingerstick    Collection Time: 09/19/21  6:25 AM   Result Value Ref Range    POC Glucose 100 65 - 105 mg/dL       Impression/Plan:    1. Encephalopathy, thought to be secondary to hypoperfusion  2. Recrudescence of right-sided stroke symptoms  3. Anemia  4. Thrombocytopenia  5. AURELIA on CKD, on hemodialysis  6. Recent CVA  7. CAD  8. CHF  9. Atrial fibrillation  10. HTN  11. Type 2 diabetes  12. Obesity  13. IM Consult for med mgt  15.  DVT prophylaxis, on Kofi Moore MD

## 2021-09-19 NOTE — PROGRESS NOTES
Physical Therapy  Facility/Department: [facility] ACUTE REHAB  Daily Treatment Note  NAME: Ian aPcheco  : 1960  MRN: [id number]    Date of Service: 2021    Discharge Recommendations:  Patient would benefit from continued therapy after discharge, Home with assist PRN   PT Equipment Recommendations  Equipment Needed: No  Other: Pt has rollator at home    Assessment   Body structures, Functions, Activity limitations: Decreased functional mobility ; Decreased strength;Decreased endurance;Decreased safe awareness;Decreased balance;Decreased cognition  Treatment Diagnosis: impaired mobility due to weakness and balance issues  Specific instructions for Next Treatment: Monitor sao2 with activity. instruct in strengthening and balance activities, instruct in standing balance and transfers/ gait  as LE strength improves  History: admitted due to acute encephalopathy  REQUIRES PT FOLLOW UP: Yes  Activity Tolerance  Activity Tolerance: Patient limited by fatigue;Patient limited by endurance     Patient Diagnosis(es): There were no encounter diagnoses. has a past medical history of Backache, unspecified, CHF (congestive heart failure) ([address] Chronic kidney disease, Coronary atherosclerosis of artery bypass graft, Cramp of limb, Gallstones, Hypertension, Insomnia, Pneumonia, Type II or unspecified type diabetes mellitus with renal manifestations, not stated as uncontrolled(250.40), Type II or unspecified type diabetes mellitus without mention of complication, not stated as uncontrolled, and Unspecified vitamin D deficiency. has a past surgical history that includes Coronary artery bypass graft; Knee arthroscopy; Carpal tunnel release; Breast surgery; Tonsillectomy; Hand surgery; Ankle fracture surgery; Cholecystectomy, open (N/A); and IR TUNNELED CVC PLACE WO SQ PORT/PUMP > 5 YEARS (2021).     Restrictions  Restrictions/Precautions  Restrictions/Precautions: Fall Risk  Required Braces or Orthoses?: Yes  Implants present? :  (R upper chest port.)  Required Braces or Orthoses  Right Lower Extremity Brace:  (Lymphedema wraps)  Left Lower Extremity Brace:  (Lymphedema wraps)  Position Activity Restriction  Other position/activity restrictions: up w/ assist, Hemodialysis M -W _ F  Subjective   General  Chart Reviewed: Yes  Additional Pertinent Hx: Per PM&R note: Raffi Pfeiffer is a 59 y.o. right-handed female with history of recent CVA, AURELIA on CKD (on hemodialysis), CAD, CHF, atrial fibrillation, HTN, type 2 diabetes admitted to [address] [place] [address] on 9/13/2021. She initially presented following a syncopal episode in dialysis, after which she was reportedly confused. She has reported right lower limb weakness and heaviness. Imaging of the brain has been unremarkable. EEG showed mild encephalopathy with no epileptiform activity. Symptoms are thought to be secondary to hypoperfusion. Pulmonology has been consulted for new oxygen requirement overnight. She currently reports ongoing impaired memory and right-sided numbness/tingling and weakness. She thinks that symptoms are improving a little bit in the right upper limb. She also reports intermittent headache and dizziness. She notes shortness of breath with activity and decreased urine output as well. Pt admitted to rehab unit on 9/17/21  Response To Previous Treatment: Patient with no complaints from previous session. Family / Caregiver Present: No  Referring Practitioner: Dr. Geronimo Wilson: Patient in good spirits and agreeable to PT. Patient stated she slept \"so so\" last night '  General Comment  Comments: Writer becoming emotinal during AM and PM treatments regarding her memory loss. Emotional support given.    Pain Screening  Patient Currently in Pain: Denies  Vital Signs  Patient Currently in Pain: Denies  Oxygen Therapy  O2 Device: None (Room air)       Orientation  Orientation  Overall Orientation Status: Within Functional Limits  Objective      Transfers  Sit to Stand: Stand by assistance  Stand to sit: Stand by assistance  Bed to Chair: Stand by assistance  Stand Pivot Transfers: Stand by assistance  Comment: Transfers completed with B UE support on RW. Ambulation  Ambulation?: Yes  Ambulation 1  Surface: level tile  Device: Rollator  Assistance: Stand by assistance  Quality of Gait: slight foward flexed posture with short step length, increase fatique noted with distance   Gait Deviations: Slow Annie; Increased ELISABET; Decreased step length;Decreased step height  Distance: 55ft x2   Comments: 1 Seated rest break required due to heaviness of R LE  Stairs/Curb  Stairs?: Yes  Stairs  # Steps : 10  Stairs Height:  (4\"/6\")  Rails: Bilateral  Device: No Device  Assistance: Contact guard assistance  Comment: patient perfromed step over step pattern with no LOB         Other exercises  Other exercises?: Yes  Other exercises 1: seated B LE exercises x20 reps with #2 and blue tband   Other exercises 2: seated UBE 5 minutes FWD/BWD   Other exercises 3: NuStep L2 x10 minutes   Other exercises 4: stand pivot transfer x3      Goals  Short term goals  Time Frame for Short term goals: 5 days  Short term goal 1: pt to tolerate 1/2 hour to 45 minutes for therapuetic exercise and activity  Short term goal 2: pt to demonstrate good technique for LE strengthening and  balance activities  Short term goal 3: pt to demonstrate  independent bed mobility with out bed rail. Short term goal 4: pt to demonstrate safe tech for transfers from varies surfaces, at supervsion level  Short term goal 5: pt able to ambulate with rollator distance fo 120 ft or more, SBA, level surfaces  Short term goal 6: pt able to go up and down 3 to 5 steps with 2 rails CGA to improve strength and endurance. Short term goal 7: pt to demonstrate goobalance during gait with rollator.   Long term goals  Time Frame for Long term goals : By DC  Long term goal 1: pt able to demonstrate mod-I transfers  Long term goal 2:  pt able to ambulate safely on level as well as carpetted/outside terraine with rollator, atleast distance of 150 ft, mod-I  Long term goal 3: pt able to go up and down 5 steps with2 rails , SBA to improve endurance. Long term goal 4: Improve gait speed by demonstrating ability to ambulate 150 ft with rollator in 2 minutes to improve fucntion. Long term goal 5: Improve R LE strength by 1/3 MMG to improve fucntion. Patient Goals   Patient goals : get better    Plan    Plan  Times per week: 900 minutes/week for combined therapy of PT/OT/ST due to decreased tolerance to activity and Hemodialysis. Specific instructions for Next Treatment: Monitor sao2 with activity. instruct in strengthening and balance activities, instruct in standing balance and transfers/ gait  as LE strength improves  Current Treatment Recommendations: Strengthening, Functional Mobility Training, Equipment Evaluation, Education, & procurement, Safety Education & Training, Gait Training, Transfer Training, Balance Training, Endurance Training, Positioning, Patient/Caregiver Education & Training, Stair training, Neuromuscular Re-education  Safety Devices  Type of devices:  All fall risk precautions in place, Gait belt, Call light within reach, Patient at risk for falls, Nurse notified, Left in chair        09/19/21 0941 09/19/21 1228   PT Individual Minutes   Time In [id number]   Time Out [id number]   Minutes 63 41     Electronically signed by Gopi Woodward PTA on 9/19/21 at 1:00 PM EDT

## 2021-09-19 NOTE — CONSULTS
SOPN Inject into the skin 3 times daily (before meals) Per sliding scale   Yes Historical Provider, MD   traZODone (DESYREL) 50 MG tablet Take 75 mg by mouth nightly   Yes Historical Provider, MD   aspirin 81 MG chewable tablet Take 1 tablet by mouth daily 8/26/21  Yes Faith Call MD   ranolazine (RANEXA) 1000 MG extended release tablet Take 1 tablet by mouth 2 times daily 8/25/21  Yes Faith Call MD   busPIRone (BUSPAR) 7.5 MG tablet Take 1 tablet by mouth 3 times daily 8/25/21  Yes Faith Call MD   ELIQUIS 5 MG TABS tablet Take 1 tablet by mouth 2 times daily 8/25/21  Yes Faith Call MD   gabapentin (NEURONTIN) 300 MG capsule Take 1 capsule by mouth 2 times daily for 30 days.  8/25/21 9/24/21 Yes Faith Call MD   insulin glargine [facility]) 100 UNIT/ML injection pen Inject 53 Units into the skin 2 times daily 8/25/21  Yes Faith Call MD   furosemide (LASIX) 80 MG tablet Take 1 tablet by mouth 2 times daily 8/26/21  Yes Faith Call MD   pantoprazole (PROTONIX) 40 MG tablet TAKE 1 TABLET BY MOUTH EVERY MORNING BEFORE BREAKFAST 9/7/21   AFSANEH David - CNP   acetaminophen (TYLENOL) 325 MG tablet Take 2 tablets by mouth every 4 hours as needed for Pain 8/25/21   Faith Call MD   atorvastatin (LIPITOR) 80 MG tablet Take 1 tablet by mouth daily 8/25/21   Faith Call MD   carvedilol (COREG) 6.25 MG tablet Take 1 tablet by mouth 2 times daily 8/25/21   Faith Call MD   blood glucose test strips (DEN CONTOUR TEST) strip 1 each by In Vitro route 3 times daily 8/25/21   Faith Call MD   febuxostat (ULORIC) 40 MG TABS tablet Take 1 tablet by mouth daily 8/26/21   Faith Call MD   ferrous sulfate (BRIAN-ARCHIE) 325 (65 Fe) MG tablet Take 1 tablet by mouth 2 times daily (with meals) 8/25/21   Faith Call MD   polyethylene glycol (GLYCOLAX) 17 g packet Take 17 g by mouth daily as needed for Constipation 8/25/21   Faith Call MD   senna (SENOKOT) 8.6 MG tablet Take 2 tablets by mouth daily as needed (Constipation) 8/25/21   Marek Pickett MD   Insulin Pen Needle (BD ULTRA-FINE PEN NEEDLES) 29G X 12.7MM MISC Use one needle for each insulin injection. 8/25/21   Marek Pickett MD   tamsulosin [facility]) 0.4 MG capsule Take 1 capsule by mouth daily 8/26/21   Marek Pickett MD   Alcohol Swabs 70 % PADS Use an alcohol swab to cleanse the skin before checking your blood sugar and before each insulin injection. 8/25/21   Marek Pickett MD   sharps container 1 each by Does not apply route as needed (dirty pen needles) 4/19/21   AFSANEH Tucker CNP   allopurinol (ZYLOPRIM) 100 MG tablet Take 1 tablet by mouth daily 4/14/21   AFSANEH Tucker CNP   Lancets 28G MISC Inject 1 each into the skin 3 times daily. 10/30/14   Fer Mccloud MD        Allergies:       Adhesive tape, Ace inhibitors, Iv dye [iodides], and Metformin and related    Social History:     Tobacco:    reports that she has never smoked. She has never used smokeless tobacco.  Alcohol:      reports no history of alcohol use. Drug Use:  reports no history of drug use.     Family History:     Family History   Problem Relation Age of Onset    Diabetes Father     Heart Failure Father        Review of Systems:     Positive and Negative as described in HPI    Constitutional:  negative for  fevers, chills, sweats, fatigue, and weight loss  HEENT:  negative for vision or hearing changes,   Respiratory:  negative for shortness of breath, cough, or congestion  Cardiovascular:  negative for  chest pain, palpitations  Gastrointestinal:  negative for nausea, vomiting, diarrhea, constipation, abdominal pain  Genitourinary:  negative for frequency, dysuria  Integument/Breast:  negative for rash, skin lesions  Musculoskeletal:  negative for muscle aches or joint pain  Neurological:  negative for headaches, dizziness, lightheadedness, numbness, pain and tingling extremities, positive for some remote memory loss  Behavior/Psych:  negative for depression and anxiety    Code Status:  Full Code    Physical Exam:     Vitals:  BP (!) 132/53   Pulse 65   Temp 97.5 °F (36.4 °C) (Oral)   Resp 18   Ht 5' 5\" (1.651 m)   Wt 244 lb 9.6 oz (110.9 kg)   SpO2 95%   BMI 40.70 kg/m²   Temp (24hrs), Av.5 °F (36.4 °C), Min:97.5 °F (36.4 °C), Max:97.5 °F (36.4 °C)      General appearance - alert, well appearing, and in no acute distress  Mental status - oriented to person, place, and time with normal affect  Head - normocephalic and atraumatic  Eyes - pupils equal and reactive, extraocular eye movements intact, conjunctiva clear  Ears - hearing appears to be intact  Nose - no drainage noted  Mouth - mucous membranes moist  Neck - supple, no carotid bruits, thyroid not palpable  Chest - clear to auscultation, normal effort  Heart - normal rate, regular rhythm, no murmurs  Abdomen - soft, nontender, nondistended, bowel sounds present all four quadrants, no masses, hepatomegaly or splenomegaly  Neurological - normal speech, no focal findings or movement disorder noted, cranial nerves II through XII grossly intact  Extremities - peripheral pulses palpable, bilateral pedal edema, no calf pain with palpation  Skin - no gross lesions, rashes, or induration noted    Osteopathic Examination: positive for  myalgias and muscle weakness    Data:     [unfilled]    Assesment:     Primary Problem  <principal problem not specified>    Active Hospital Problems    Diagnosis Date Noted    Encephalopathy [G93.40] 2021       Plan:     Orders Placed This Encounter   Procedures    Basic Metabolic Panel w/ Reflex to MG    CBC    ADULT DIET; Regular; 4 carb choices (60 gm/meal);  Low Sodium (2 gm)    Notify physician    Notify physician    Up with assistance    Vital signs per unit routine    HYPOGLYCEMIA TREATMENT: blood glucose less than 50 mg/dL and patient  ALERT and TOLERATING PO    HYPOGLYCEMIA TREATMENT: blood glucose less than 70 mg/dL and patient ALERT and TOLERATING PO    HYPOGLYCEMIA TREATMENT: blood glucose less than 70 mg/dL and patient NOT ALERT or NPO    Vital signs per unit routine    Notify physician    Weekly weight    Encourage deep breathing and coughing every two hours while awake    Bladder training: Offer the patient opportunity to void    Bladder training: Bladder scan, initially perform after voiding    Turn patient    Remove and replace AGUSTIN hose daily    Place intermittent pneumatic compression device    Up with assistance    Turn or assist with turn approximately every 2 hours if patient is unable to turn self. Remind patient to turn if necessary.  Assess skin per unit guidelines    Full Code    Inpatient consult to Primary Care Provider    Inpatient consult to Pulmonology    Consult to Internal Medicine    Inpatient consult to Spiritual Services    Contact isolation    Contact isolation    OT eval and treat    PT evaluation and treat    Incentive spirometry    Initiate Oxygen Therapy Protocol    Initiate Oxygen Therapy Protocol    Incentive spirometry    Pulse Oximetry Spot Check    Speech-Language Pathology (SLP) Eval and Treat    POCT Glucose    POCT Glucose    POC Glucose Fingerstick    POC Glucose Fingerstick    POC Glucose Fingerstick    POC Glucose Fingerstick    POC Glucose Fingerstick    POC Glucose Fingerstick    Hemodialysis    PATIENT STATUS (DIRECT) REHAB IP    Fall precautions   1.        Electronically signed by Bill Babin MD on 9/19/2021 at 10:06 AM     Copy sent to Dr. Shelbi Miner, APRN - CNP

## 2021-09-19 NOTE — PLAN OF CARE
Problem: Falls - Risk of:  Goal: Will remain free from falls  Description: Will remain free from falls  9/19/2021 0921 by Larissa Rogers RN  Outcome: Ongoing  9/19/2021 0621 by Paul Salomon RN  Outcome: Ongoing  9/19/2021 0016 by Jesus Holman RN  Outcome: Ongoing  Note: Patient remains free from falls at this time. Patient is aware of her limitations, and uses the call light before ambulation. Goal: Absence of physical injury  Description: Absence of physical injury  9/19/2021 0921 by Larissa Rogers RN  Outcome: Ongoing  9/19/2021 0621 by Paul Salomon RN  Outcome: Ongoing  9/19/2021 0016 by Jesus Holman RN  Outcome: Ongoing     Problem: Skin Integrity:  Goal: Will show no infection signs and symptoms  Description: Will show no infection signs and symptoms  9/19/2021 0921 by Larissa Rogers RN  Outcome: Ongoing  9/19/2021 0621 by Paul Salomon RN  Outcome: Ongoing  9/19/2021 0016 by Jesus Holman RN  Outcome: Ongoing  Note: Patient shows no signs or symptoms of infection at this time.    Goal: Absence of new skin breakdown  Description: Absence of new skin breakdown  9/19/2021 0921 by Larissa Rogers RN  Outcome: Ongoing  9/19/2021 0621 by Paul Salomon RN  Outcome: Ongoing  9/19/2021 0016 by Jesus Holman RN  Outcome: Ongoing     Problem: SAFETY  Goal: LTG - Patient will demonstrate safety requirements appropriate to situation/environment  9/19/2021 0921 by Larissa Rogers RN  Outcome: Ongoing  9/19/2021 0621 by Paul Salomon RN  Outcome: Ongoing  9/19/2021 0016 by Jesus Holman RN  Outcome: Ongoing  Goal: LTG - patient will utilize safety techniques  9/19/2021 0921 by Larissa Rogers RN  Outcome: Ongoing  9/19/2021 0621 by Paul Salomon RN  Outcome: Ongoing  9/19/2021 0016 by Jesus Holman RN  Outcome: Ongoing  Goal: STG - patient locks brakes on wheelchair  9/19/2021 0921 by Larissa Rideau, RN  Outcome: Ongoing  9/19/2021 0621 by Paul Salomon RN  Outcome: Ongoing  9/19/2021 0016 by Miles Kerns RN  Outcome: Ongoing  Goal: STG - Patient uses call light consistently to request assistance with transfers  9/19/2021 0921 by Ann-Marie Vuong RN  Outcome: Ongoing  9/19/2021 0621 by Pallavi Last RN  Outcome: Ongoing  9/19/2021 0016 by Miles Kerns RN  Outcome: Ongoing     Problem: Musculor/Skeletal Functional Status  Goal: Highest potential functional level  9/19/2021 0921 by Ann-Marie Vuong RN  Outcome: Ongoing  9/19/2021 0621 by Pallavi Last RN  Outcome: Ongoing  9/19/2021 0016 by Miles Kerns RN  Outcome: Ongoing  Goal: Absence of falls  9/19/2021 0921 by Ann-Marie Vuong RN  Outcome: Ongoing  9/19/2021 0621 by Pallavi Last RN  Outcome: Ongoing  9/19/2021 0016 by Miles Kerns RN  Outcome: Ongoing     Problem: Pain:  Goal: Pain level will decrease  Description: Pain level will decrease  9/19/2021 0921 by Ann-Marie Vuong RN  Outcome: Ongoing  9/19/2021 0621 by Pallavi Last RN  Outcome: Ongoing  9/19/2021 0016 by Miles Kerns RN  Outcome: Ongoing  Goal: Control of acute pain  Description: Control of acute pain  9/19/2021 0921 by Ann-Marie Vuong RN  Outcome: Ongoing  9/19/2021 0621 by Pallavi Last RN  Outcome: Ongoing  9/19/2021 0016 by Miles Kerns RN  Outcome: Ongoing  Goal: Control of chronic pain  Description: Control of chronic pain  9/19/2021 0921 by Ann-Marie Vuong RN  Outcome: Ongoing  9/19/2021 0621 by Pallavi Last RN  Outcome: Ongoing  9/19/2021 0016 by Miles Kerns RN  Outcome: Ongoing

## 2021-09-19 NOTE — FLOWSHEET NOTE
09/19/21 0747   Encounter Summary   Services provided to: Patient   Spiritual/Moravian   Type Ritual   Intervention Anointing   Sacraments   Sacrament of Sick-Anointing Anointed  (Fr Erickson 9-14-21)

## 2021-09-19 NOTE — FLOWSHEET NOTE
09/19/21 1225   Encounter Summary   Services provided to: Patient   Referral/Consult From: N[facility]   Continue Visiting   (9-19-21 V)   Volunteer Visit Yes   Complexity of Encounter Low   Length of Encounter 15 minutes   Spiritual/Lutheran   Type Spiritual support   Intervention Prayer;Communion   Sacraments   Communion Patient received communion

## 2021-09-19 NOTE — PROGRESS NOTES
[address] [facility]    ACUTE REHABILITATION OCCUPATIONAL THERAPY  DAILY NOTE    Date: 21  Patient Name: Nancy Roman      Room: 9935/4195-43    MRN: [id number]   : 1960  (59 y.o.)  Gender: female      Diagnosis: Acute encephalopathy, thought to be secondary to hypoperfusion Recrudescence of right-sided stroke symptoms, admit post syncope event during dialysis with AURELIA on [facility]I. Restrictions  Restrictions/Precautions: Fall Risk  Implants present? :  (R upper chest port.)  Other position/activity restrictions: up w/ assist, Hemodialysis M -W _ F  Required Braces or Orthoses  Right Lower Extremity Brace:  (Lymphedema wraps)  Left Lower Extremity Brace:  (Lymphedema wraps)  Required Braces or Orthoses?: Yes    Subjective  Subjective: \"I remembered that from 26 years ago. \"  pt notes a long term memory of her niece as a baby that came to her mind when therapist talking about a baby Protestant. Comments: \"I couldn't remember. \"  pt notes with sadness a relative recounting a day's events pt had shared with her in the past but it did not result in pt remembering. Objective  Cognition  Attention Span: Difficulty dividing attention  Memory: Decreased long term memory;Decreased short term memory  Following Commands: Follows one step commands consistently  Safety Judgement: Good awareness of safety precautions  Insights: Fully aware of deficits  Cognition  Cognition Comment: word finding deficits noted at times, pt needed extra time and describing to self to produce term \"swollen. \"  to describe LLE,  good safety: good inititation of seated rest break  as pt needed to rest between standing tasks due to leg weakness. memory:  \"I remembered that from 26 years ago. \"  pt notes a long term memory of her niece as a baby that came to her mind when therapist talking about a baby Protestant. but unable to remember as a relative recounted events from a day pt spent with her.   pt using her memory book to write notes to herself to assist with recall. notes if she does not write it right away she forgets. pt remembered to ask her family to bring in shampoo, sock aide device and 4 wheeled walker. Balance  Standing Balance: Contact guard assistance  Transfers  Stand Step Transfers: Contact guard assistance  Stand Pivot Transfers: Contact guard assistance  Sit to stand: Contact guard assistance  Stand to sit: Contact guard assistance  Transfer Comments: RW  Standing Balance  Time: pt ilana 6 min, 4 min x 2, 2-3 min x 2 in am, ilana 5 min in pm.  Activity: adls with walker  Comment: pt frequently needs seated rests - limited stand ilana due to LE weak and becomes light headed. Functional Mobility  Functional - Mobility Device: Rolling Walker  Activity: To/from bathroom; Retrieve items;Transport items  Assist Level: Contact guard assistance  Functional Mobility Comments: walker safety techniques ambulating during adls in pt room demo for pt then pt completed accessing closet and obtain set up for next day adls. pt needed to rest between tasks due to leg weakness. Toilet Transfers  Toilet - Technique: Ambulating  Equipment Used: Grab bars  Toilet Transfer: Contact guard assistance     Type of ROM/Therapeutic Exercise  Type of ROM/Therapeutic Exercise: AROM; Free weights  Exercises  Shoulder Flexion: RUE: 8 reps x 2, AROM then with 1 pound weight, achieved 100 degrees  Grasp/Release: resistive gripper 7 reps x 2 R hand                       ADL  Equipment Provided: Reacher;Sock aid  Feeding: Setup  Grooming: Setup  UE Bathing: None  LE Bathing: Moderate assistance  UE Dressing: Setup  LE Dressing: Moderate assistance  Toileting: Minimal assistance  Additional Comments: pt notes diarrhea resolved but requesting to keep pullups due to urgency with urination. pt notes prior to CVA she could cross to reach LLE with effort and lean forward to reach RLE in low chair.   pt amb to obtain set up for tomorrow (already donned fresh clothes today), seated rest then amb to toilet, changed pullups, washed stalin/ bottom, don shorts, washed hands and face brushed teeth. requested assist to apply lotion to dry feet, was assisted (TA)  to don black compression socks (needs to bring brandon in from home) then pt used reachers to don compression sleeve onto b calves with min A.  donned slipper socks with sock aide with set up. Instrumental ADL's  Instrumental ADLs: Yes  Meal Prep  Meal Prep Level of Assistance: Contact guard assistance  Meal Preparation: pt practiced holding a pot in R hand and filling 1/2 full with water, lift to bottom of sink and back to counter, needed to reduce water content by 1/2 to make lighter, practiced again, practiced pouring water into sink using RUE. Assessment     Activity Tolerance: Patient limited by fatigue  Activity Tolerance: room air sats at 94 and 97.  pt frequently needs seated rests - limited stand ilana due to LE weak and becomes light headed. RN was alerted to edema BLE , worse on L and OT assisting pt to don lymphedema compression garments from home. 09/19/21 1321 09/19/21 1326   OT Individual Minutes   Time In [id number]   Time Out 2663 [id number]   Minutes 80 12          Patient Education:  Patient Goals   Patient goals : return home safe and indep with self care  Learner:patient  Method: demonstration and explanation       Outcome: acknowledged understanding , demonstrated understanding and needs reinforcement   OT Education  OT Education: Equipment  Patient Education: walker safety techniques ambulating during adls in pt room. Plan  Plan  Times per week: Plan Of Care:  Due to impaired functional endurance and/or medical issues, the patient is to be seen for a combined total of at least a  900 minutes over 7 days of Physical, Occupational and/or Speech Therapy.   Times per day: Twice a day  Current Treatment Recommendations: Self-Care / ADL, Strengthening, ROM, Balance Training, Functional Mobility Training, Endurance Training, Neuromuscular Re-education, Cognitive Reorientation, Safety Education & Training, Patient/Caregiver Education & Training, Equipment Evaluation, Education, & procurement, Home Management Training, Cognitive/Perceptual Training  Patient Goals   Patient goals : return home safe and indep with self care  Short term goals  Time Frame for Short term goals: 1 week  Short term goal 1: pt to use BUE to cut food successfully and feed self with RUE mod indep. Short term goal 2: SBA ambulate to obtain set up for adls. Short term goal 3: SBA LE bathe and dress, able to don compression stockings with own device with min assist  Short term goal 4: ilana 9-11 min stand, ambulate with SBA and good safety for adls  Short term goal 5: ilana 10 reps x 3 R shld AROM achieving 95 degrees, ilana 10 reps x 3 R hand  strengthening. Long term goals  Time Frame for Long term goals : by discharge  Long term goal 1: mod indep ambulate to obtain set up for adls ([address] [place] [address])  Long term goal 2: mod indep self care and toileting  Long term goal 3: tolerate 15-17 min stand, ambulate for adls mod indep with good safety. Long term goal 4: indep RUE HEP  Long term goal 5: increase R shoulder AROM to 100 and MMT to 3+ and R  to 20 pounds to improve use of RUE for adls.        Electronically signed by Tay Torres OT on 9/19/21 at 1:48 PM EDT

## 2021-09-19 NOTE — PLAN OF CARE
Problem: Falls - Risk of:  Goal: Will remain free from falls  Description: Will remain free from falls  9/19/2021 0016 by José Antonio Duran RN  Outcome: Ongoing  Note: Patient remains free from falls at this time. Patient is aware of her limitations, and uses the call light before ambulation. 9/18/2021 1838 by Ashutosh Coon RN  Outcome: Ongoing  9/18/2021 1113 by Abran Mcfadden RN  Outcome: Ongoing  Goal: Absence of physical injury  Description: Absence of physical injury  9/19/2021 0016 by José Antonio Duran RN  Outcome: Ongoing  9/18/2021 1838 by Ashutosh Coon RN  Outcome: Ongoing  9/18/2021 1113 by Abran Mcfadden RN  Outcome: Ongoing     Problem: Skin Integrity:  Goal: Will show no infection signs and symptoms  Description: Will show no infection signs and symptoms  9/19/2021 0016 by José Antonio Duran RN  Outcome: Ongoing  Note: Patient shows no signs or symptoms of infection at this time.    9/18/2021 1838 by Ashutosh Coon RN  Outcome: Ongoing  9/18/2021 1113 by Abran Mcfadden RN  Outcome: Ongoing  Goal: Absence of new skin breakdown  Description: Absence of new skin breakdown  9/19/2021 0016 by José Antonio Duran RN  Outcome: Ongoing  9/18/2021 1838 by Ashutosh Coon RN  Outcome: Ongoing  9/18/2021 1113 by Abran Mcfadden RN  Outcome: Ongoing     Problem: SAFETY  Goal: LTG - Patient will demonstrate safety requirements appropriate to situation/environment  9/19/2021 0016 by José Antonio Duran RN  Outcome: Ongoing  9/18/2021 1838 by Ashutosh Coon RN  Outcome: Ongoing  9/18/2021 1113 by Abran Mcfadden RN  Outcome: Ongoing  Goal: LTG - patient will utilize safety techniques  9/19/2021 0016 by José Antonio Duran RN  Outcome: Ongoing  9/18/2021 1838 by Ashutosh Coon RN  Outcome: Ongoing  9/18/2021 1113 by Abran Mcfadden RN  Outcome: Ongoing  Goal: STG - patient locks brakes on wheelchair  9/19/2021 0016 by José Antonio Duran RN  Outcome: Ongoing  9/18/2021 1838 by Ashutosh Coon RN  Outcome: Ongoing  9/18/2021 1113 by Larissa Rogers RN  Outcome: Ongoing  Goal: STG - Patient uses call light consistently to request assistance with transfers  9/19/2021 0016 by Jesus Holman RN  Outcome: Ongoing  9/18/2021 1838 by Paul Salomon RN  Outcome: Ongoing  9/18/2021 1113 by Larissa Rogers RN  Outcome: Ongoing     Problem: Musculor/Skeletal Functional Status  Goal: Highest potential functional level  9/19/2021 0016 by Jesus Holman RN  Outcome: Ongoing  9/18/2021 1838 by Paul Salomon RN  Outcome: Ongoing  Goal: Absence of falls  9/19/2021 0016 by Jesus Holman RN  Outcome: Ongoing  9/18/2021 1838 by Paul Salomon RN  Outcome: Ongoing     Problem: Pain:  Goal: Pain level will decrease  Description: Pain level will decrease  9/19/2021 0016 by Jesus Holman RN  Outcome: Ongoing  9/18/2021 1838 by Paul Salomon RN  Outcome: Ongoing  Goal: Control of acute pain  Description: Control of acute pain  9/19/2021 0016 by Jesus Holman RN  Outcome: Ongoing  9/18/2021 1838 by Paul Salomon RN  Outcome: Ongoing  Goal: Control of chronic pain  Description: Control of chronic pain  9/19/2021 0016 by Jesus Holman RN  Outcome: Ongoing  9/18/2021 1838 by Paul Salomon RN  Outcome: Ongoing

## 2021-09-20 LAB
GLUCOSE BLD-MCNC: 115 MG/DL (ref 65–105)
GLUCOSE BLD-MCNC: 180 MG/DL (ref 65–105)
GLUCOSE BLD-MCNC: 203 MG/DL (ref 65–105)
GLUCOSE BLD-MCNC: 295 MG/DL (ref 65–105)

## 2021-09-20 PROCEDURE — 97130 THER IVNTJ EA ADDL 15 MIN: CPT

## 2021-09-20 PROCEDURE — 97530 THERAPEUTIC ACTIVITIES: CPT

## 2021-09-20 PROCEDURE — 1180000000 HC REHAB R&B

## 2021-09-20 PROCEDURE — P9047 ALBUMIN (HUMAN), 25%, 50ML: HCPCS | Performed by: FAMILY MEDICINE

## 2021-09-20 PROCEDURE — 6370000000 HC RX 637 (ALT 250 FOR IP): Performed by: PHYSICAL MEDICINE & REHABILITATION

## 2021-09-20 PROCEDURE — 97129 THER IVNTJ 1ST 15 MIN: CPT

## 2021-09-20 PROCEDURE — 6370000000 HC RX 637 (ALT 250 FOR IP): Performed by: FAMILY MEDICINE

## 2021-09-20 PROCEDURE — 99232 SBSQ HOSP IP/OBS MODERATE 35: CPT | Performed by: STUDENT IN AN ORGANIZED HEALTH CARE EDUCATION/TRAINING PROGRAM

## 2021-09-20 PROCEDURE — 97110 THERAPEUTIC EXERCISES: CPT

## 2021-09-20 PROCEDURE — 6360000002 HC RX W HCPCS: Performed by: FAMILY MEDICINE

## 2021-09-20 PROCEDURE — 90935 HEMODIALYSIS ONE EVALUATION: CPT

## 2021-09-20 PROCEDURE — 2500000003 HC RX 250 WO HCPCS: Performed by: INTERNAL MEDICINE

## 2021-09-20 PROCEDURE — 97116 GAIT TRAINING THERAPY: CPT

## 2021-09-20 PROCEDURE — 82947 ASSAY GLUCOSE BLOOD QUANT: CPT

## 2021-09-20 PROCEDURE — 5A1D70Z PERFORMANCE OF URINARY FILTRATION, INTERMITTENT, LESS THAN 6 HOURS PER DAY: ICD-10-PCS | Performed by: STUDENT IN AN ORGANIZED HEALTH CARE EDUCATION/TRAINING PROGRAM

## 2021-09-20 PROCEDURE — 92507 TX SP LANG VOICE COMM INDIV: CPT

## 2021-09-20 PROCEDURE — 97535 SELF CARE MNGMENT TRAINING: CPT

## 2021-09-20 RX ORDER — SODIUM CITRATE 4 % (5 ML)
1.9 SYRINGE (ML) MISCELLANEOUS ONCE
Status: COMPLETED | OUTPATIENT
Start: 2021-09-20 | End: 2021-09-20

## 2021-09-20 RX ADMIN — INSULIN LISPRO 6 UNITS: 100 INJECTION, SOLUTION INTRAVENOUS; SUBCUTANEOUS at 11:53

## 2021-09-20 RX ADMIN — TAMSULOSIN HYDROCHLORIDE 0.4 MG: 0.4 CAPSULE ORAL at 09:07

## 2021-09-20 RX ADMIN — INSULIN LISPRO 2 UNITS: 100 INJECTION, SOLUTION INTRAVENOUS; SUBCUTANEOUS at 10:38

## 2021-09-20 RX ADMIN — CARVEDILOL 6.25 MG: 6.25 TABLET, FILM COATED ORAL at 09:10

## 2021-09-20 RX ADMIN — RANOLAZINE 1000 MG: 1000 TABLET, FILM COATED, EXTENDED RELEASE ORAL at 21:37

## 2021-09-20 RX ADMIN — BUSPIRONE HYDROCHLORIDE 7.5 MG: 7.5 TABLET ORAL at 14:15

## 2021-09-20 RX ADMIN — SENNOSIDES 17.2 MG: 8.6 TABLET, COATED ORAL at 10:39

## 2021-09-20 RX ADMIN — FERROUS SULFATE TAB 325 MG (65 MG ELEMENTAL FE) 325 MG: 325 (65 FE) TAB at 09:06

## 2021-09-20 RX ADMIN — GABAPENTIN 300 MG: 300 CAPSULE ORAL at 09:07

## 2021-09-20 RX ADMIN — INSULIN GLARGINE 53 UNITS: 100 INJECTION, SOLUTION SUBCUTANEOUS at 21:52

## 2021-09-20 RX ADMIN — CARVEDILOL 6.25 MG: 6.25 TABLET, FILM COATED ORAL at 21:37

## 2021-09-20 RX ADMIN — PANTOPRAZOLE SODIUM 40 MG: 40 TABLET, DELAYED RELEASE ORAL at 09:20

## 2021-09-20 RX ADMIN — INSULIN LISPRO 2 UNITS: 100 INJECTION, SOLUTION INTRAVENOUS; SUBCUTANEOUS at 21:52

## 2021-09-20 RX ADMIN — ALBUMIN (HUMAN) 25 G: 0.25 INJECTION, SOLUTION INTRAVENOUS at 15:39

## 2021-09-20 RX ADMIN — RANOLAZINE 1000 MG: 1000 TABLET, FILM COATED, EXTENDED RELEASE ORAL at 09:14

## 2021-09-20 RX ADMIN — FUROSEMIDE 80 MG: 40 TABLET ORAL at 09:06

## 2021-09-20 RX ADMIN — ACETAMINOPHEN 650 MG: 325 TABLET, FILM COATED ORAL at 21:39

## 2021-09-20 RX ADMIN — BUSPIRONE HYDROCHLORIDE 7.5 MG: 7.5 TABLET ORAL at 21:37

## 2021-09-20 RX ADMIN — Medication 1.9 ML: at 17:05

## 2021-09-20 RX ADMIN — FEBUXOSTAT 40 MG: 40 TABLET, FILM COATED ORAL at 09:14

## 2021-09-20 RX ADMIN — GABAPENTIN 300 MG: 300 CAPSULE ORAL at 21:36

## 2021-09-20 RX ADMIN — APIXABAN 5 MG: 5 TABLET, FILM COATED ORAL at 09:07

## 2021-09-20 RX ADMIN — ATORVASTATIN CALCIUM 80 MG: 80 TABLET, FILM COATED ORAL at 09:07

## 2021-09-20 RX ADMIN — BUSPIRONE HYDROCHLORIDE 7.5 MG: 7.5 TABLET ORAL at 09:13

## 2021-09-20 RX ADMIN — POLYETHYLENE GLYCOL 3350 17 G: 17 POWDER, FOR SOLUTION ORAL at 09:03

## 2021-09-20 RX ADMIN — ASPIRIN 81 MG CHEWABLE TABLET 81 MG: 81 TABLET CHEWABLE at 09:07

## 2021-09-20 RX ADMIN — INSULIN GLARGINE 53 UNITS: 100 INJECTION, SOLUTION SUBCUTANEOUS at 10:38

## 2021-09-20 RX ADMIN — APIXABAN 5 MG: 5 TABLET, FILM COATED ORAL at 21:36

## 2021-09-20 RX ADMIN — TRAZODONE HYDROCHLORIDE 75 MG: 150 TABLET ORAL at 21:36

## 2021-09-20 ASSESSMENT — PAIN SCALES - GENERAL: PAINLEVEL_OUTOF10: 5

## 2021-09-20 NOTE — PROGRESS NOTES
HEMODIALYSIS PRE-TREATMENT NOTE    Patient Identifiers prior to treatment: name, birthdate and band    Isolation Required: MRSA                      Isolation Type: Contact       (please document if patient is being managed as a PUI/COVID-19 patient)        Hepatitis status:                           Date Drawn                             Result  Hepatitis B Surface Antigen 08/30/2021     neg                     Hepatitis B Surface Antibody 08/30/2021 pos     60   Hepatitis B Core Antibody 08/30/2021 neg          How was Hepatitis Status verified: Chart     Was a copy of the labs you documented provided to facility for the patient's chart: yes    Hemodialysis orders verified: yes    Access Within normal limits ( I.e. s/s of infection,...): yes     Pre-Assessment completed: yes    Pre-dialysis report received from: Riaz Wise                    Time: 6637

## 2021-09-20 NOTE — PROGRESS NOTES
Physical Medicine & Rehabilitation  Progress Note      Subjective:      Cornelius Fuentes is a 59 y.o. female with encephalopathy, recrudescence of right-sided stroke symptoms. She reports doing fine today. She continues to have difficulty with her memory (primarily long-term memory). She also continues to report \"tightness\" in the right upper limb and \"heaviness\" in the right lower limb. She denies any other acute concerns. Discussed recommendation to refrain from driving at this time until cleared by a doctor. ROS:  Denies fevers, chills, sweats. No chest pain, palpitations, lightheadedness. Denies coughing, wheezing or shortness of breath. Denies abdominal pain, nausea, diarrhea or constipation. No new areas of joint pain. Denies new areas of numbness or weakness. Denies new anxiety or depression issues. No new skin problems. Rehabilitation:   Progressing in therapies. PT:  Restrictions/Precautions: Fall Risk  Implants present? :  (R upper chest port.)  Other position/activity restrictions: up w/ assist, Hemodialysis M / W / F  Required Braces or Orthoses  Right Lower Extremity Brace:  (Lymphedema wraps)  Left Lower Extremity Brace:  (Lymphedema wraps)   Transfers  Sit to Stand: Stand by assistance  Stand to sit: Stand by assistance  Bed to Chair: Stand by assistance  Stand Pivot Transfers: Stand by assistance  Comment: Transfers completed with B UE support on RW.    Ambulation 1  Surface: level tile  Device: Rollator  Assistance: Stand by assistance  Quality of Gait: slight foward flexed posture with short step length, increase fatique noted with distance, pt required a rest break before further activity  Gait Deviations: Slow Annie, Increased ELISABET, Decreased step length, Decreased step height  Distance: 100ft & 130ft  Comments: Pt reported that her RLE felt very heavy    Transfers  Sit to Stand: Stand by assistance  Stand to sit: Stand by assistance  Bed to Chair: Stand by assistance  Stand Pivot Transfers: Stand by assistance  Comment: Transfers completed with B UE support on RW. Ambulation  Ambulation?: Yes  More Ambulation?: No  Ambulation 1  Surface: level tile  Device: Rollator  Assistance: Stand by assistance  Quality of Gait: slight foward flexed posture with short step length, increase fatique noted with distance, pt required a rest break before further activity  Gait Deviations: Slow Annie, Increased ELISABET, Decreased step length, Decreased step height  Distance: 100ft & 130ft  Comments: Pt reported that her RLE felt very heavy    Surface: level tile  Ambulation 1  Surface: level tile  Device: Rollator  Assistance: Stand by assistance  Quality of Gait: slight foward flexed posture with short step length, increase fatique noted with distance, pt required a rest break before further activity  Gait Deviations: Slow Annie, Increased ELISABET, Decreased step length, Decreased step height  Distance: 100ft & 130ft  Comments: Pt reported that her RLE felt very heavy    OT:  ADL  Equipment Provided: Reacher, Sock aid, Long-handled sponge  Feeding: Setup  Grooming: Setup  UE Bathing: Setup  LE Bathing: Setup, Supervision  UE Dressing: Setup  LE Dressing: Setup, Minimal assistance (Assist with B compression stockings. )  Toileting: None  Additional Comments: no noted memory/sequencing deficits during ADL session. Instrumental ADL's  Instrumental ADLs: Yes     Balance  Sitting Balance: Supervision (some impulse noted in shower. )  Standing Balance: Stand by assistance   Standing Balance  Time: AM: 1- 2min x3   Activity: AM: functional mobility, self care tasks. Comment: pt frequently needs seated rests - limited stand ilana due to LE weak and becomes light headed. Functional Mobility  Functional - Mobility Device: Rolling Walker  Activity: To/from bathroom  Assist Level: Contact guard assistance  Functional Mobility Comments: good RW techniques noted today.       Bed mobility  Rolling to Left: Stand by assistance  Rolling to Right: Stand by assistance  Supine to Sit: Minimal assistance (From flat bed, needed assist for trunk.)  Sit to Supine: Stand by assistance  Comment: Unable to assess as patient was in chair upon arrival.  Transfers  Stand Step Transfers: Contact guard assistance  Stand Pivot Transfers: Contact guard assistance  Sit to stand: Contact guard assistance  Stand to sit: Contact guard assistance  Transfer Comments: RW   Toilet Transfers  Toilet - Technique: Ambulating  Equipment Used: Grab bars  Toilet Transfer: Contact guard assistance     Shower Transfers  Shower - Transfer From: Walker  Shower - Transfer Type: To and From  Shower - Transfer To: Shower seat with back  Shower - Technique: Ambulating  Shower Transfers: Contact Guard, Minimal assistance  Shower Transfers Comments: grab bar for supported prn. SPEECH:  Subjective: [x]? Alert     [x]? Cooperative     []? Confused     []? Agitated    []? Lethargic     Objective/Assessment:  Attention: functional throughout     Recall/Problem solving: Pt. Continues to use notebook to record information when learns of memory or concept she had forgotten (ie, pt. Sharing c ST re: concept of holidays which she  learned about in conversation c her sisters yesterday). Pt. Able to recall tasks she worked on [employer] last week.      Other: Word finding:   Generative namin members of concrete category (no time constraint) 100%. Pt. Demonstrated  long response latency across all tasks. Subjective: [x]? Alert     [x]? Cooperative     []? Confused     []? Agitated    []? Lethargic     Objective/Assessment:  Attention: functional throughout     Recall/Problem solving: Pt. Continues to use notebook to record information when learns of memory or concept she had forgotten (ie, pt. Sharing c ST re: concept of holidays which she  learned about in conversation c her sisters yesterday). Missing equipment- 94%, 100% c cues.   Word deductions- 100%. Pt. Asking Hailey Talamantes is a birthday? \"      Other: Word finding:   Pt. Minerva circumlocuting when attempting to find word/explain process. Pt. Demonstrated  long response latency across all tasks.         Current Medications:   Current Facility-Administered Medications: 0.9 % sodium chloride infusion, 25 mL, IntraVENous, PRN  ondansetron (ZOFRAN-ODT) disintegrating tablet 4 mg, 4 mg, Oral, Q8H PRN **OR** [DISCONTINUED] ondansetron (ZOFRAN) injection 4 mg, 4 mg, IntraVENous, Q6H PRN  sodium chloride flush 0.9 % injection 5-40 mL, 5-40 mL, IntraVENous, PRN  potassium chloride (KLOR-CON M) extended release tablet 40 mEq, 40 mEq, Oral, PRN **OR** potassium bicarb-citric acid (EFFER-K) effervescent tablet 40 mEq, 40 mEq, Oral, PRN **OR** potassium chloride 10 mEq/100 mL IVPB (Peripheral Line), 10 mEq, IntraVENous, PRN  apixaban (ELIQUIS) tablet 5 mg, 5 mg, Oral, BID  aspirin chewable tablet 81 mg, 81 mg, Oral, Daily  atorvastatin (LIPITOR) tablet 80 mg, 80 mg, Oral, Daily  busPIRone (BUSPAR) tablet 7.5 mg, 7.5 mg, Oral, TID  carvedilol (COREG) tablet 6.25 mg, 6.25 mg, Oral, BID  dextrose 5 % solution, 100 mL/hr, IntraVENous, PRN  dextrose 50 % IV solution, 12.5 g, IntraVENous, PRN  febuxostat (ULORIC) tablet 40 mg, 40 mg, Oral, Daily  ferrous sulfate (IRON 325) tablet 325 mg, 325 mg, Oral, BID WC  furosemide (LASIX) tablet 80 mg, 80 mg, Oral, BID  gabapentin (NEURONTIN) capsule 300 mg, 300 mg, Oral, BID  glucagon (rDNA) injection 1 mg, 1 mg, IntraMUSCular, PRN  glucose (GLUTOSE) 40 % oral gel 15 g, 15 g, Oral, PRN  insulin glargine (LANTUS) injection vial 53 Units, 53 Units, SubCUTAneous, BID  insulin lispro (HUMALOG) injection vial 0-12 Units, 0-12 Units, SubCUTAneous, TID WC  insulin lispro (HUMALOG) injection vial 0-6 Units, 0-6 Units, SubCUTAneous, Nightly  pantoprazole (PROTONIX) tablet 40 mg, 40 mg, Oral, QAM AC  perflutren lipid microspheres (DEFINITY) injection 2.2 mg, 2 mL, IntraVENous, ONCE PRN  polyethylene glycol (GLYCOLAX) packet 17 g, 17 g, Oral, Daily PRN  ranolazine (RANEXA) extended release tablet 1,000 mg, 1,000 mg, Oral, BID  sodium citrate 4 % injection 1.9 mL, 1.9 mL, IntraCATHeter, PRN  sodium citrate 4 % injection 1.9 mL, 1.9 mL, IntraCATHeter, PRN  tamsulosin (FLOMAX) capsule 0.4 mg, 0.4 mg, Oral, Daily  traZODone (DESYREL) tablet 75 mg, 75 mg, Oral, Nightly  acetaminophen (TYLENOL) tablet 650 mg, 650 mg, Oral, Q4H PRN  polyethylene glycol (GLYCOLAX) packet 17 g, 17 g, Oral, Daily  senna (SENOKOT) tablet 17.2 mg, 2 tablet, Oral, Daily PRN  bisacodyl (DULCOLAX) suppository 10 mg, 10 mg, Rectal, Daily PRN      Objective:  BP (!) 130/58   Pulse 59   Temp 97.9 °F (36.6 °C) (Oral)   Resp 18   Ht 5' 5\" (1.651 m)   Wt 239 lb 3.2 oz (108.5 kg)   SpO2 98%   BMI 39.80 kg/m²       GEN: Well developed, well nourished, no acute distress  HEENT: NCAT. EOM grossly intact. Hearing grossly intact. Mucous membranes pink and moist.  RESP: Normal breath sounds with no wheezing, rales, or rhonchi. Respirations WNL and unlabored. CV: Regular rate and rhythm. No murmurs, rubs, or gallops. ABD: Soft, non-distended, BS+ and equal.  NEURO: Alert. Speech fluent. Impaired memory. Sensation to light touch decreased in right upper and lower limbs. MSK:  Muscle bulk is normal bilaterally. Strength 4/5 in right upper and lower limbs. Strength 4+/5 in left upper and lower limbs. LIMBS: Edema present in bilateral lower limbs. SKIN: Warm and dry with good turgor. PSYCH: Mood WNL. Affect WNL. Appropriately interactive. Diagnostics:     CBC:   Recent Labs     09/18/21  0642   WBC 4.5   RBC 3.52*   HGB 11.3*   HCT 33.5*   MCV 95.3   RDW 14.7   *     BMP:   Recent Labs     09/18/21  0642      K 4.3      CO2 28   BUN 20   CREATININE 2.23*   GLUCOSE 101*     BNP: No results for input(s): BNP in the last 72 hours. PT/INR: No results for input(s): PROTIME, INR in the last 72 hours.   APTT: No results for input(s): APTT in the last 72 hours. CARDIAC ENZYMES: No results for input(s): CKMB, CKMBINDEX, TROPONINT in the last 72 hours. Invalid input(s): CKTOTAL;3  FASTING LIPID PANEL:  Lab Results   Component Value Date    CHOL 105 04/09/2015    HDL 43 04/09/2015    TRIG 168 04/09/2015     LIVER PROFILE: No results for input(s): AST, ALT, ALB, BILIDIR, BILITOT, ALKPHOS in the last 72 hours. Impression/Plan:   Impaired ADLs, gait, and mobility due to:    1. Encephalopathy:  Thought to be secondary to hypoperfusion. SLP for cognition/memory. 2. Recrudescence of right-sided stroke symptoms: In the setting of recent CVA. PT/OT  for gait, mobility, strengthening, endurance, ADLs, and self care. On aspirin, atorvastatin. 3. Anemia:  Hemoglobin 11.3 on 9/18, stable. Monitoring. On iron supplementation. 4. Thrombocytopenia:  Platelets 171 on 9/61, stable. Monitoring. 5. AURELIA on CKD: On hemodialysis MWF. Nephrology following. 6. CAD:  On aspirin, atorvastatin, ranexa  7. Atrial fibrillation:  On eliquis, carvedilol  8. HTN:  On carvedilol, furosemide  9. Type 2 diabetes with neuropathy:  On lantus, humalog sliding scale. On gabpentin  10. Anxiety: On buspirone TID  11. Insomnia:  On trazodone nightly  12. Gout:  On febuxostat  13. Obesity  14. Bowel Management: Miralax daily, senokot prn, dulcolax prn. 15. DVT prophylaxis:  On eliquis  16.  Internal Medicine for medical management      Electronically signed by Ulysses Hones, MD on 9/21/2021 at 12:05 AM

## 2021-09-20 NOTE — DISCHARGE SUMMARY
[facility] Discharge Summary      Patient ID: Mallory Tapia    MRN: [id number]     Acct:  [de-identified]       Patient's PCP: Danell Dakins, APRN - CNP    Admit Date: 9/13/2021     Discharge Date: 9/17/2021      Admitting Physician: Jose Rodriguez MD    Discharge Physician: Jose Rodriguez MD     Discharge Diagnoses:    Primary Problem  Stroke-like symptoms    Active Hospital Problems    Diagnosis Date Noted    Morbid obesity with BMI of 40.0-44.9, adult ([facility] [address] [E66.01, Z68.41] 09/14/2021    Acute encephalopathy [G93.40]     Stroke-like symptoms [R29.90] 09/13/2021    Chronic ischemic heart disease [I25.9] 07/23/2021    Anemia in stage 4 chronic kidney disease ([facility] [address] [N18.4, D63.1] 05/03/2021    Essential hypertension [I10] 05/03/2021    Diabetic polyneuropathy associated with type 2 diabetes mellitus ([facility] [address] [E11.42] 02/17/2020    Chronic diastolic heart failure ([facility] [address] [I50.32] 09/20/2018    Type 2 diabetes mellitus with kidney complication, with long-term current use of insulin ([facility] [address] [E11.29, Z79.4] 09/20/2018    Mixed hyperlipidemia [E78.2] 07/27/2016    Coronary artery disease [I25.10] 05/04/2015     Past Medical History:   Diagnosis Date    Backache, unspecified     CHF (congestive heart failure) (HCC)     Chronic kidney disease     Coronary atherosclerosis of artery bypass graft     Cramp of limb     Gallstones     Hypertension     Insomnia     Pneumonia     Type II or unspecified type diabetes mellitus with renal manifestations, not stated as uncontrolled(250.40)     Type II or unspecified type diabetes mellitus without mention of complication, not stated as uncontrolled     Unspecified vitamin D deficiency      The patient was seen and examined on day of discharge and this discharge summary is in conjunction with any daily progress note from day of discharge.     Code Status:  Full Code    Hospital Course: uncomplicated    Consults:  pulmonary/intensive care, nephrology, neurology, rehabilitation medicine and telestroke    Significant Diagnostic Studies: as above, and as follows: see chart    Treatments: as above    Disposition: ARU    Discharged Condition: Stable    Follow Up:  AFSANEH Valencia CNP in one week after discharge from ARU. Discharge Medications:    David Ojeda   Home Medication Instructions ETH:[id number]    Printed on:09/20/21 0358   Medication Information                      acetaminophen (TYLENOL) 325 MG tablet  Take 2 tablets by mouth every 4 hours as needed for Pain             Alcohol Swabs 70 % PADS  Use an alcohol swab to cleanse the skin before checking your blood sugar and before each insulin injection. aspirin 81 MG chewable tablet  Take 1 tablet by mouth daily             atorvastatin (LIPITOR) 80 MG tablet  Take 1 tablet by mouth daily             blood glucose test strips (DEN CONTOUR TEST) strip  1 each by In Vitro route 3 times daily             busPIRone (BUSPAR) 7.5 MG tablet  Take 1 tablet by mouth 3 times daily             carvedilol (COREG) 6.25 MG tablet  Take 1 tablet by mouth 2 times daily             ELIQUIS 5 MG TABS tablet  Take 1 tablet by mouth 2 times daily             febuxostat (ULORIC) 40 MG TABS tablet  Take 1 tablet by mouth daily             ferrous sulfate (BRIAN-ARCHIE) 325 (65 Fe) MG tablet  Take 1 tablet by mouth 2 times daily (with meals)             furosemide (LASIX) 80 MG tablet  Take 1 tablet by mouth 2 times daily             gabapentin (NEURONTIN) 300 MG capsule  Take 1 capsule by mouth 2 times daily for 30 days. insulin glargine (BASAGLAR KWIKPEN) 100 UNIT/ML injection pen  Inject 53 Units into the skin 2 times daily             insulin lispro, 1 Unit Dial, (HUMALOG KWIKPEN) 100 UNIT/ML SOPN  Inject into the skin 3 times daily (before meals) Per sliding scale             Insulin Pen Needle (BD ULTRA-FINE PEN NEEDLES) 29G X 12.7MM MISC  Use one needle for each insulin injection. Lancets 28G MISC  Inject 1 each into the skin 3 times daily. pantoprazole (PROTONIX) 40 MG tablet  TAKE 1 TABLET BY MOUTH EVERY MORNING BEFORE BREAKFAST             polyethylene glycol (GLYCOLAX) 17 g packet  Take 17 g by mouth daily as needed for Constipation             ranolazine (RANEXA) 1000 MG extended release tablet  Take 1 tablet by mouth 2 times daily             senna (SENOKOT) 8.6 MG tablet  Take 2 tablets by mouth daily as needed (Constipation)             sharps container  1 each by Does not apply route as needed (dirty pen needles)             tamsulosin (FLOMAX) 0.4 MG capsule  Take 1 capsule by mouth daily             traZODone (DESYREL) 50 MG tablet  Take 75 mg by mouth nightly                  Activity: activity as tolerated    Diet: diabetic diet and renal diet    Time Spent on discharge is more than 40 minutes in the examination, evaluation, counseling and review of medications and discharge plan.       Electronically signed by Elias Salcedo MD on 9/20/2021 at 1:31 PM

## 2021-09-20 NOTE — PATIENT CARE CONFERENCE
[address]   ACUTE REHABILITATION  TEAM CONFERENCE NOTE  Date: 21  Patient Name: Lisa Huber       Room: 5528/7109-71  MRN: [id number]       : 1960  (59 y.o.)     Gender: female   Referring Practitioner: Dr. Amos Pimentel   Encephalopathy [G93.40]  Diagnosis: Acute encephalopathy, thought to be secondary to hypoperfusion Recrudescence of right-sided stroke symptoms, admit post syncope event during dialysis with AURELIA on [facility]. NURSING  Bladder  Always Continent  Bowel   Always Continent  Date of Last BM:   Intervention    Both Bowel & Bladder Program     Wounds/Incisions/Ulcers: No skin issues identified  Medication Education Program: Patient able to manage medications and being educated by nursing  Pain: no pain concerns to address    Fall Risk:  Falling star program initiated    PHYSICAL THERAPY       Transfers:  Sit to Stand: Stand by assistance  Stand to sit: Stand by assistance  Bed to Chair: Stand by assistance    Ambulation 1  Surface: level tile  Device: Rollator  Assistance: Stand by assistance  Quality of Gait: slight foward flexed posture with short step length, increase fatique noted with distance, pt required a rest break before further activity  Gait Deviations: Slow Annie; Increased ELISABET; Decreased step length;Decreased step height  Distance: 100ft & 130ft  Comments: Pt reported that her RLE felt very heavy    # Steps : 10  Stairs Height:  (4\"/6\")  Rails: Bilateral  Device: No Device  Assistance: Contact guard assistance  Comment: patient perfromed step over step pattern with no LOB                Equipment Needed: No  Other: Pt has rollator at home    Pt with slight R sligth weakness from recent CVA, decreased tolerance to activity due to recent CVA and Hemodialysis. Will conitnue POC to improve fucntion/endurance for safe discharge to home.     Goals  Time Frame for Short term goals: 5 days  Short term goal 1: pt to tolerate 1/2 hour to 45 minutes for therapuetic exercise and activity  Short term goal 2: pt to demonstrate good technique for LE strengthening and  balance activities  Short term goal 3: pt to demonstrate  independent bed mobility with out bed rail. Short term goal 4: pt to demonstrate safe tech for transfers from varies surfaces, at supervsion level  Short term goal 5: pt able to ambulate with rollator distance fo 120 ft or more, SBA, level surfaces  Short term goal 6: pt able to go up and down 3 to 5 steps with 2 rails CGA to improve strength and endurance. Short term goal 7: pt to demonstrate goobalance during gait with rollator. OCCUPATIONAL THERAPY  SELF CARE   Equipment Provided: Reacher, Sock aid, Long-handled sponge  Eating            Setup   Oral Hygiene            Setup   Shower/Bathe Self             UE Bathing: Setup  LE Bathing: Setup;Supervision   Upper Body Dressing            Setup   Lower Body Dressing            Putting On/Taking Off Footwear             Setup;Minimal assistance (Assist with B compression stockings)   Toilet Transfer             Toilet - Technique: Ambulating  Equipment Used: Grab bars  Toilet Transfer: Contact guard assistance   Toileting Hygiene            Minimal assistance          Shower Transfers: Contact Guard;Minimal assistance  Balance  Sitting Balance: Supervision (some impulse noted in shower. )  Standing Balance: Stand by assistance  Standing Balance  Time: AM: 1- 2min x3   Activity: AM: functional mobility, self care tasks. Comment: pt frequently needs seated rests - limited stand ilana due to LE weak and becomes light headed. Equipment Recommendations  Equipment Needed:  (TBD)       Short term goals  Time Frame for Short term goals: 1 week  Short term goal 1: pt to use BUE to cut food successfully and feed self with RUE mod indep. Short term goal 2: SBA ambulate to obtain set up for adls.   Short term goal 3: SBA LE bathe and dress, able to don compression stockings with own device with min assist  Short term goal 4: ilana 9-11 min stand, ambulate with SBA and good safety for adls  Short term goal 5: ilana 10 reps x 3 R shld AROM achieving 95 degrees, ilana 10 reps x 3 R hand  strengthening. SPEECH THERAPY  Supervision level expression, min A memory (long term) and problem solving  Short Term Goal: mod I expression, supervision level memory and problem solving. NUTRITION  Weight: 244 lb 9.6 oz (110.9 kg) / Body mass index is 40.7 kg/m². Diet Rx: Carbohudrate Control, 2 gm Na. PO intake appears adequate. Ref. Range 9/19/2021 06:25 9/19/2021 11:10 9/19/2021 15:56 9/19/2021 20:01 9/20/2021 06:57 9/20/2021 11:30   POC Glucose Latest Ref Range: 65 - 105 mg/dL 100 204 (H) 244 (H) 296 (H) 180 (H) 295 (H)   Please see nutrition note for details. SOCIAL WORK ASSESSMENT  Assessment:  Pt lives independently at home and her mother lives with her. Her father lives at [place] and she was visiting with him daily. Pt has 2 sisters who are helpful and supportive. Pt had home health prior to admission with [facility]  Pre-Admission Status:  Lives With: Other (comment) (Lives with mother)  Type of Home:  (Condo)  Home Layout: One level  Home Access: Ramped entrance  Bathroom Shower/Tub: Walk-in shower, Doors (Threshold to step over)  Bathroom Toilet: Handicap height  Bathroom Equipment: Grab bars in shower, Built-in shower seat (Pt. reports using wall of shower to get off toilet)  Bathroom Accessibility: Walker accessible  Home Equipment: 4 wheeled walker, Cane, Sock aid, Reacher (Has a recliner at home)  ADL Assistance: Independent  Homemaking Assistance: Needs assistance (Pt. uses motorized cart at the grocery store, sister does grocery shopping typically. 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 sleeps in flat bed at home,  BLE lymphedema -pt notes per GP not to use pumps from home currently. pt had returned to indep don compression garments with brandon. pt notes prior to CVA she could cross to reach LLE with effort and lean forward to reach RLE in low chair  Additional Comments: Pt recently discharged from Rehab at [address] on 8- w/ home health was set up for PT, OT and Speech. Pt was going to dialysis 3 X/ week. .  Pt is a confused/memory impairement. Pt.'s mother becoming less mobile, pt.'s sister helps out mother. Pt. reports that 2 sisters can assist. Pt.s father in [facility] home. Pt reports that her 2 sisters live close by and that her one sister is retired and able to assist as needed. Family Education: Need to make contact with family to initiate education    Percentage Risk for Readmission: High 28% - 100%   Readmission Risk              Risk of Unplanned Readmission:  31       %    Critical Items: None        Problem / Barrier Intervention / Plan  Results   Impaired function  Strengthening, balance activities and functional mobility training     Impaired cognition Cognitive retraining exercises     Altered ADL Status and safety  Training in modified care strategies and use of devices to support care safety                                               Total Self Care Score    Total Mobility Score  Admission Score:  27      Admission Score:  38  Goal:  42/42         Goal:  80/90   `  Discharge Plan   Estimated Discharge Date: 9/25/21  Home evaluation needed?  Home Evaluation Indication (NO, Requires ReEval, YES/Date): No home evaluation need indicated for patient at this time  Overnight or Day Pass: No  Factors facilitating achievement of predicted outcomes: Family support, Motivated, Cooperative and Pleasant  Barriers to the achievement of predicted outcomes: Decreased endurance, Upper extremity weakness, Lower extremity weakness, Medical complications, Skin Care and Medication managment    Functional Goals at discharge:  Predicted Outcome: Home with familyPATIENT'S LEVEL OF ASSISTANCE: Modified independence and Close Supervision  Discharge therapy goals:  PT: Long term goals  Time Frame for Long term goals : By DC  Long term goal 1: pt able to demonstrate mod-I transfers  Long term goal 2:  pt able to ambulate safely on level as well as carpetted/outside terraine with rollator, atleast distance of 150 ft, mod-I  Long term goal 3: pt able to go up and down 5 steps with2 rails , SBA to improve endurance. Long term goal 4: Improve gait speed by demonstrating ability to ambulate 150 ft with rollator in 2 minutes to improve fucntion. Long term goal 5: Improve R LE strength by 1/3 MMG to improve fucntion. OT:Long term goals  Time Frame for Long term goals : by discharge  Long term goal 1: mod indep ambulate to obtain set up for adls ([employer])  Long term goal 2: mod indep self care and toileting  Long term goal 3: tolerate 15-17 min stand, ambulate for adls mod indep with good safety. Long term goal 4: indep RUE HEP  Long term goal 5: increase R shoulder AROM to 100 and MMT to 3+ and R  to 20 pounds to improve use of RUE for adls. ST: mod I    Team Members Present at Conference:  :  Sravan Moreno  Occupational Therapist: Lisa Boo OT    Physical Therapist: Mirta Brown PT  Speech Therapist: Franco WEISSCCC/SLP  Nurse: Ursula Patrick RN    Dietary/Nutrition: Berlinda Boast RD, LD  Pastoral Care: Marcial Painting  CMG: Klever Ward RN    I approve the established interdisciplinary plan of care as documented within the medical record of Jamel Clayton.     Tono Camarillo MD

## 2021-09-20 NOTE — CONSULTS
NEPHROLOGY CONSULT     Patient :  Scott Mccarthy; 59 y.o. MRN# [id number]  Location:  8210/9919-48  Attending:  Danyelle Hernandez Date:  9/17/2021   Hospital Day: 3      Reason for Consult: Acute kidney injury dialysis dependent      Chief Complaint: Confusion  History Obtained From: Patient    History of Present Illness: This is a 59 y.o. female with past medical history of longstanding type 2 diabetes insulin-dependent diabetes mellitus, essential hypertension, coronary artery disease status post CABG in 2004, coronary artery stent in 2019, CHF with EF of 55%, history of mild to moderate LVH diastolic dysfunction, recent history of acute/subacute stroke in left frontal lobe with right-sided weakness, patient developed acute kidney injury due to contrast-induced nephropathy requiring dialysis since August 2021  Patient currently receives dialysis Monday Wednesday Friday under Dr. Patrice Milan at [facility]. Patient presented from dialysis with altered mental status. She apparently had transient loss of consciousness but unclear the duration and was confused. She has numbness over the right lower extremity and cannot feel the right side of her body which is new. Patient had MRI of the brain which showed no acute changes. MRA of the neck showed no hemodynamically significant stenosis identified. Patient transferred to acute rehab on 9/17/21.   Nephrologist consulted for continuation of dialysis      Past Medical History:        Diagnosis Date    Backache, unspecified     CHF (congestive heart failure) (HCC)     Chronic kidney disease     Coronary atherosclerosis of artery bypass graft     Cramp of limb     Gallstones     Hypertension     Insomnia     Pneumonia     Type II or unspecified type diabetes mellitus with renal manifestations, not stated as uncontrolled(250.40)     Type II or unspecified type diabetes mellitus without mention of complication, not stated as uncontrolled     Unspecified vitamin D deficiency        Past Surgical History:        Procedure Laterality Date    ANKLE FRACTURE SURGERY      BREAST SURGERY      CARPAL TUNNEL RELEASE      CHOLECYSTECTOMY, OPEN N/A     CORONARY ARTERY BYPASS GRAFT      x3    HAND SURGERY      IR TUNNELED CATHETER PLACEMENT GREATER THAN 5 YEARS  8/18/2021    IR TUNNELED CATHETER PLACEMENT GREATER THAN 5 YEARS 8/18/2021 [facility] SPECIAL PROCEDURES    KNEE ARTHROSCOPY      right    TONSILLECTOMY         Current Medications:    0.9 % sodium chloride infusion, PRN  ondansetron (ZOFRAN-ODT) disintegrating tablet 4 mg, Q8H PRN  sodium chloride flush 0.9 % injection 5-40 mL, PRN  potassium chloride (KLOR-CON M) extended release tablet 40 mEq, PRN   Or  potassium bicarb-citric acid (EFFER-K) effervescent tablet 40 mEq, PRN   Or  potassium chloride 10 mEq/100 mL IVPB (Peripheral Line), PRN  albumin human 25 % IV solution 25 g, PRN  apixaban (ELIQUIS) tablet 5 mg, BID  aspirin chewable tablet 81 mg, Daily  atorvastatin (LIPITOR) tablet 80 mg, Daily  busPIRone (BUSPAR) tablet 7.5 mg, TID  carvedilol (COREG) tablet 6.25 mg, BID  dextrose 5 % solution, PRN  dextrose 50 % IV solution, PRN  febuxostat (ULORIC) tablet 40 mg, Daily  ferrous sulfate (IRON 325) tablet 325 mg, BID WC  furosemide (LASIX) tablet 80 mg, BID  gabapentin (NEURONTIN) capsule 300 mg, BID  glucagon (rDNA) injection 1 mg, PRN  glucose (GLUTOSE) 40 % oral gel 15 g, PRN  insulin glargine (LANTUS) injection vial 53 Units, BID  insulin lispro (HUMALOG) injection vial 0-12 Units, TID WC  insulin lispro (HUMALOG) injection vial 0-6 Units, Nightly  pantoprazole (PROTONIX) tablet 40 mg, QAM AC  perflutren lipid microspheres (DEFINITY) injection 2.2 mg, ONCE PRN  polyethylene glycol (GLYCOLAX) packet 17 g, Daily PRN  ranolazine (RANEXA) extended release tablet 1,000 mg, BID  sodium citrate 4 % injection 1.9 mL, PRN  sodium citrate 4 % injection 1.9 mL, PRN  tamsulosin (FLOMAX) capsule 0.4 mg, Daily  traZODone (DESYREL) tablet 75 mg, Nightly  acetaminophen (TYLENOL) tablet 650 mg, Q4H PRN  polyethylene glycol (GLYCOLAX) packet 17 g, Daily  senna (SENOKOT) tablet 17.2 mg, Daily PRN  bisacodyl (DULCOLAX) suppository 10 mg, Daily PRN        Allergies:  Adhesive tape, Ace inhibitors, Iv dye [iodides], and Metformin and related    Social History:   Social History     Socioeconomic History    Marital status: Single     Spouse name: Not on file    Number of children: Not on file    Years of education: Not on file    Highest education level: Not on file   Occupational History    Not on file   Tobacco Use    Smoking status: Never Smoker    Smokeless tobacco: Never Used   Substance and Sexual Activity    Alcohol use: No    Drug use: No    Sexual activity: Not Currently   Other Topics Concern    Not on file   Social History Narrative    Not on file     Social Determinants of Health     Financial Resource Strain: Low Risk     Difficulty of Paying Living Expenses: Not hard at all   Food Insecurity:     Worried About [address] in the Last Year:     Ran Out of Food in the Last Year:    Transportation Needs:     Lack of Transportation (Medical):      Lack of Transportation (Non-Medical):    Physical Activity:     Days of Exercise per Week:     Minutes of Exercise per Session:    Stress:     Feeling of Stress :    Social Connections:     Frequency of Communication with Friends and Family:     Frequency of Social Gatherings with Friends and Family:     Attends Church Services:     Active Member of Clubs or Organizations:     Attends Club or Organization Meetings:     Marital Status:    Intimate Partner Violence:     Fear of Current or Ex-Partner:     Emotionally Abused:     Physically Abused:     Sexually Abused:        Family History:   Family History   Problem Relation Age of Onset    Diabetes Father     Heart Failure Father        Review of Systems:    Constitutional: No fever, no chills, no night sweats, fatigue, generalized weakness, loss of appetite  HEENT:  No headache, otalgia, itchy eyes, epistaxis, nasal discharge or sore throat. Cardiac:  No chest pain, dyspnea, orthopnea or PND, palpitations  Chest:  No cough, hemoptysis, pleuritic chest pain, wheezing,SOB  Abdomen:  No abdominal pain, nausea, vomiting, diarrhea, melena, dysphagia hematemesis,constipation, abdominal bloating, flank pain  Neuro:  No CVA, TIA or seizure like activity. Skin:   No rashes, no itching. :   No hematuria, no pyuria, no dysuria, no flank pain. Extremities:  No swelling or joint pains. Objective:  CURRENT TEMPERATURE:  Temp: 97.5 °F (36.4 °C)  MAXIMUM TEMPERATURE OVER 24HRS:  Temp (24hrs), Av.6 °F (36.4 °C), Min:97.5 °F (36.4 °C), Max:97.7 °F (36.5 °C)    CURRENT RESPIRATORY RATE:  Resp: 18  CURRENT PULSE:  Pulse: 68  CURRENT BLOOD PRESSURE:  BP: 139/64  24HR BLOOD PRESSURE RANGE:  Systolic (81WVE), JLM:437 , Min:136 , CRC:682   ; Diastolic (96MNR), UD, Min:48, Max:64    24HR INTAKE/OUTPUT:      Intake/Output Summary (Last 24 hours) at 2021 1438  Last data filed at 2021 0424  Gross per 24 hour   Intake 480 ml   Output --   Net 480 ml     Patient Vitals for the past 96 hrs (Last 3 readings):   Weight   21 1001 244 lb 9.6 oz (110.9 kg)   21 1935 246 lb 11.2 oz (111.9 kg)       Physical Exam:  GENERAL APPEARANCE: Alert and cooperative, and appears to be in no acute distress. HEAD: normocephalic  EYES:  EOMI. Not pale, anicteric   NOSE:  No nasal discharge. THROAT:  Oral cavity and pharynx normal. Moist  CARDIAC: Normal S1 and S2. No S3, S4 or murmurs. Rhythm is regular. LUNGS: Clear to auscultation and percussion without rales, rhonchi, wheezing or diminished breath sounds. NECK: Neck supple, non-tender without lymphadenopathy, masses or thyromegaly.  JVD-neg  BACK: Examination of the spine reveals normal gait and posture, no spinal deformity, symmetry of spinal muscles, without tenderness, decreased range of motion or muscular spasm  MUSKULOSKELETAL: Adequately aligned spine. No joint erythema or tenderness. EXTREMITIES: No edema. Peripheral pulses intact. NEURO: Right-sided weakness and numbness      Labs:   CBC:  Recent Labs     09/18/21  0642   WBC 4.5   RBC 3.52*   HGB 11.3*   HCT 33.5*   MCV 95.3   MCH 32.2   MCHC 33.8   RDW 14.7   *   MPV 8.7      BMP:   Recent Labs     09/18/21  0642      K 4.3      CO2 28   BUN 20   CREATININE 2.23*   GLUCOSE 101*   CALCIUM 9.6      Phosphorus:  No results for input(s): PHOS in the last 72 hours. Magnesium: No results for input(s): MG in the last 72 hours. Albumin: No results for input(s): LABALBU in the last 72 hours. IRON:  No results for input(s): IRON in the last 72 hours. Iron Saturation:  Invalid input(s): PERCENTFE  TIBC:  No results for input(s): TIBC in the last 72 hours. FERRITIN:  No results for input(s): FERRITIN in the last 72 hours. SPEP: No results for input(s): SPEP in the last 72 hours. No results for input(s): PROT, ALBCAL, ALBCAL, ALBPCT, LABALPH, A1PCT, LABALPH, A2PCT, LABBETA, BETAPCT, GAMGLOB, GGPCT, PATH in the last 72 hours. UPEP: No results for input(s): TPU in the last 72 hours. Urine Sodium:  No results for input(s): JASON in the last 72 hours. Urine Potassium: No results for input(s): KUR in the last 72 hours. Urine Chloride: Invalid input(s): CLU  Urine Ph:  Invalid input(s): PO4U  Urine Osmolarity: No results for input(s): OSMOU in the last 72 hours. Urine Creatinine:  No results for input(s): LABCREA in the last 72 hours. Urine Eosinophils: Invalid input(s): EOSU  Urine Protein:  No results for input(s): TPU in the last 72 hours.   Urinalysis:  No results for input(s): Lynette Burows, PHUR, LABCAST, WBCUA, RBCUA, MUCUS, TRICHOMONAS, YEAST, BACTERIA, CLARITYU, SPECGRAV, LEUKOCYTESUR, UROBILINOGEN, BILIRUBINUR, BLOODU, GLUCOSEU, KETUA, AMORPHOUS in the last 72 hours. Radiology:  Reviewed as available. Assessment:  1. AURELIA secondary to ATN, dialysis dependent on hemodialysis Monday Wednesday Friday  2. Essential hypertension  3. History of CVA  4. Type 2 diabetes insulin-dependent diabetes mellitus       Plan:  1. Hemodialysis Monday Wednesday Friday, HD today as per orders      Thank you for the consultation.       Electronically signed by Gerald Hawkins MD on 9/20/2021 at 2:38 PM

## 2021-09-20 NOTE — PLAN OF CARE
Problem: Falls - Risk of:  Goal: Will remain free from falls  Description: Will remain free from falls  Outcome: Ongoing  Goal: Absence of physical injury  Description: Absence of physical injury  Outcome: Ongoing     Problem: Skin Integrity:  Goal: Will show no infection signs and symptoms  Description: Will show no infection signs and symptoms  Outcome: Ongoing  Goal: Absence of new skin breakdown  Description: Absence of new skin breakdown  Outcome: Ongoing     Problem: SAFETY  Goal: LTG - Patient will demonstrate safety requirements appropriate to situation/environment  Outcome: Ongoing  Goal: LTG - patient will utilize safety techniques  Outcome: Ongoing  Goal: STG - patient locks brakes on wheelchair  Outcome: Ongoing  Goal: STG - Patient uses call light consistently to request assistance with transfers  Outcome: Ongoing     Problem: Musculor/Skeletal Functional Status  Goal: Highest potential functional level  Outcome: Ongoing  Goal: Absence of falls  Outcome: Ongoing     Problem: Nutrition  Goal: Optimal nutrition therapy  Outcome: Ongoing     Problem: Musculor/Skeletal Functional Status  Goal: Highest potential functional level  Outcome: Ongoing  Goal: Absence of falls  Outcome: Ongoing     Problem: Pain:  Goal: Pain level will decrease  Description: Pain level will decrease  Outcome: Ongoing  Goal: Control of acute pain  Description: Control of acute pain  Outcome: Ongoing  Goal: Control of chronic pain  Description: Control of chronic pain  Outcome: Ongoing

## 2021-09-20 NOTE — PROGRESS NOTES
Speech Language Pathology  Speech Language Pathology  [facility] Acute Rehab Unit at [facility]  Cognitive/Speech/Language  Treatment Note    Date: 2021  Patients Name: Jessica Marin  MRN: [id number]  Diagnosis: Cognitive impairment   Patient Active Problem List   Diagnosis Code    Coronary artery disease I25.10    Chronic diastolic heart failure (HCC) I50.32    Diabetic polyneuropathy associated with type 2 diabetes mellitus (HCC) E11.42    History of coronary artery bypass graft Z95.1    Iron deficiency anemia D50.9    Spinal stenosis of lumbar region with neurogenic claudication M48.062    Mixed hyperlipidemia E78.2    Stage 4 chronic kidney disease (HCC) N18.4    Type 2 diabetes mellitus with kidney complication, with long-term current use of insulin ([facility]) E11.29, Z79.4    Syncope and collapse R55    Obesity, Class II, BMI 35-39.9 E66.9    Thyroid nodule greater than or equal to 1 cm in diameter incidentally noted on imaging study E04.1    Essential hypertension I10    Anemia in stage 4 chronic kidney disease (HCC) N18.4, D63.1    Chronic ischemic heart disease I25.9    Acute cerebrovascular accident (CVA) ([facility] [address] I63.9    Stroke-like symptoms R29.90    Morbid obesity with BMI of 40.0-44.9, adult ([facility]) E66.01, Z68.41    Acute encephalopathy G93.40    Encephalopathy G93.40    Debility R53.81       Pain: 0/10    Cognitive Treatment    Treatment time:  7653-6009  (short session d/t toileting/RN care)    Subjective: [x] Alert [x] Cooperative     [] Confused     [] Agitated    [] Lethargic    Objective/Assessment:  Attention: functional throughout    Recall/Problem solving: Pt. Continues to use notebook to record information when learns of memory or concept she had forgotten (ie, pt. Sharing c ST re: concept of holidays which she  learned about in conversation c her sisters yesterday). Pt. Chrisjuliana Grace to recall tasks she worked on c ST last week.      Other: Word finding:   Generative namin members of concrete category (no time constraint) 100%. Pt. Demonstrated  long response latency across all tasks. Plan:  [x] Continue ST services    [] Discharge from ST:      Discharge recommendations: [] Inpatient Rehab   [] [place]   [] Outpatient Therapy  [] Follow up at trauma clinic   [] Other:       Treatment completed by: Vitaliy Staley. RINA/SLP

## 2021-09-20 NOTE — PROGRESS NOTES
[facility]: SHITAL BRYAN   ACUTE REHABILITATION OCCUPATIONAL THERAPY  DAILY NOTE    Date: 21  Patient Name: Nancy Roman      Room: 7437/0551-07    MRN: [id number]   : 1960  (59 y.o.)  Gender: female   Referring Practitioner: Mk Pederson MD  Diagnosis: Acute encephalopathy, thought to be secondary to hypoperfusion Recrudescence of right-sided stroke symptoms, admit post syncope event during dialysis with AURELIA on CKI. Additional Pertinent Hx: d/c from  acute rehab here on 21 post treat for CVA with R weak. BLE lymphedema -pt notes per GP not to use pumps from home currently. pt to bring in comp garments and brandon. Per PM&R note: Nancy Roman is a 59 y.o. right-handed female with history of recent CVA, AURELIA on CKD (on hemodialysis), CAD, CHF, atrial fibrillation, HTN, type 2 diabetes admitted to [address] [place] [address] on 2021. She initially presented following a syncopal episode in dialysis, after which she was reportedly confused. She has reported right lower limb weakness and heaviness. Imaging of the brain has been unremarkable. EEG showed mild encephalopathy with no epileptiform activity. Symptoms are thought to be secondary to hypoperfusion. Pulmonology has been consulted for new oxygen requirement overnight. She currently reports ongoing impaired memory and right-sided numbness/tingling and weakness. Restrictions  Restrictions/Precautions: Fall Risk  Implants present? :  (R upper chest port.)  Other position/activity restrictions: up w/ assist, Hemodialysis M / W / F  Required Braces or Orthoses  Right Lower Extremity Brace:  (Lymphedema wraps)  Left Lower Extremity Brace:  (Lymphedema wraps)  Required Braces or Orthoses?: Yes    Subjective  Comments: Pt pleasant and motivated, still struggling with certain memories however seems to be in good spirits throughout sessions.    Patient Currently in Pain: Denies  Restrictions/Precautions: Fall Risk  Overall Orientation Status: Within Functional Limits  Orientation Level:  (requires room board for date at time. )          Objective  Cognition  Overall Cognitive Status: Impaired  Attention Span: Difficulty dividing attention  Memory: Decreased long term memory;Decreased short term memory  Following Commands: Follows one step commands consistently  Safety Judgement: Good awareness of safety precautions  Awareness of Errors: Decreased awareness of errors  Insights: Fully aware of deficits  Sequencing and Organization: Able to problem solve independently  Perception  Overall Perceptual Status: Impaired  Initiation: Cues to initiate tasks  Balance  Sitting Balance: Supervision (some impulse noted in shower. )  Standing Balance: Stand by assistance  Transfers  Sit to stand: Contact guard assistance  Stand to sit: Contact guard assistance  Standing Balance  Time: AM: 1- 2min x3   Activity: AM: functional mobility, self care tasks. Functional Mobility  Functional - Mobility Device: Rolling Walker  Activity: To/from bathroom  Assist Level: Contact guard assistance  Functional Mobility Comments: good RW techniques noted today. Shower Transfers  Shower - Transfer From: Arletta Loupe - Transfer Type: To and From  Shower - Transfer To: Shower seat with back  Shower - Technique: Ambulating  Shower Transfers: Contact Guard;Minimal assistance  Shower Transfers Comments: grab bar for supported prn. Type of ROM/Therapeutic Exercise  Type of ROM/Therapeutic Exercise: AROM  Exercises  Grasp/Release: 2nd setting hand gripper x20 B hands. Additional Activities: Other  Additional Activities: Pt engaged in large bead threading task to address B hand integration and [facility] skills for increased independence in self care tasks. Task also addressed sequencing pattern followthrough; pt tolerated well with x1 error noted with pt self recognition and resolve.  Pt also participated in key peg placment/removal task with B hands; minimal difficulty noted for peg manipulation, noted cognitive deficit for problem solving difficulty on which way to manipulate peg as well as minimal [address] difficulty; pt noted increased difficulty in L hand. Cues for non use of opposite UE to assist. No difficulty noted to remove pegs. ADL  Equipment Provided: Reacher;Sock aid;Long-handled sponge  Feeding: Setup  Grooming: Setup  UE Bathing: Setup  LE Bathing: Setup;Supervision  UE Dressing: Setup  LE Dressing: Setup;Minimal assistance (Assist with B compression stockings. )  Toileting: None  Additional Comments: no noted memory/sequencing deficits during ADL session. Assessment  Performance deficits / Impairments: Decreased ADL status; Decreased functional mobility ; Decreased ROM; Decreased strength;Decreased safe awareness;Decreased cognition;Decreased endurance;Decreased balance;Decreased high-level IADLs;Decreased fine motor control;Decreased coordination  Prognosis: Good  Discharge Recommendations: Patient would benefit from continued therapy after discharge  Activity Tolerance: Patient Tolerated treatment well  Safety Devices in place: Yes  Type of devices: All fall risk precautions in place;Call light within reach;Gait belt;Patient at risk for falls; Left in chair;Nurse notified  Equipment Recommendations  Equipment Needed:  (TBD)        Plan  Plan  Times per week: Plan Of Care:  Due to impaired functional endurance and/or medical issues, the patient is to be seen for a combined total of at least a  900 minutes over 7 days of Physical, Occupational and/or Speech Therapy.   Times per day: Twice a day  Current Treatment Recommendations: Self-Care / ADL, Strengthening, ROM, Balance Training, Functional Mobility Training, Endurance Training, Neuromuscular Re-education, Cognitive Reorientation, Safety Education & Training, Patient/Caregiver Education & Training, Equipment Evaluation, Education, & procurement, Home Management Training, Cognitive/Perceptual Training  Patient Goals   Patient goals : return home safe and indep with self care  Short term goals  Time Frame for Short term goals: 1 week  Short term goal 1: pt to use BUE to cut food successfully and feed self with RUE mod indep. Short term goal 2: SBA ambulate to obtain set up for adls. Short term goal 3: SBA LE bathe and dress, able to don compression stockings with own device with min assist  Short term goal 4: ilana 9-11 min stand, ambulate with SBA and good safety for adls  Short term goal 5: ilana 10 reps x 3 R shld AROM achieving 95 degrees, ilana 10 reps x 3 R hand  strengthening. Long term goals  Time Frame for Long term goals : by discharge  Long term goal 1: mod indep ambulate to obtain set up for adls ([employer])  Long term goal 2: mod indep self care and toileting  Long term goal 3: tolerate 15-17 min stand, ambulate for adls mod indep with good safety. Long term goal 4: indep RUE HEP  Long term goal 5: increase R shoulder AROM to 100 and MMT to 3+ and R  to 20 pounds to improve use of RUE for adls.         09/20/21 1408 09/20/21 1409   OT Individual Minutes   Time In 1036 1333   Time Out 1150 1400   Minutes 74 27     Electronically signed by DAYANA Mendoza on 9/20/21 at 4:58 PM EDT

## 2021-09-20 NOTE — PROGRESS NOTES
Physical Therapy  Facility/Department: [facility] ACUTE REHAB  Daily Treatment Note  NAME: Jessica Powell  : 1960  MRN: [id number]    Date of Service: 2021    Discharge Recommendations:  Patient would benefit from continued therapy after discharge, Home with assist PRN   PT Equipment Recommendations  Equipment Needed: No  Other: Pt has rollator at home    Assessment   Body structures, Functions, Activity limitations: Decreased functional mobility ; Decreased strength;Decreased endurance;Decreased safe awareness;Decreased balance;Decreased cognition  Treatment Diagnosis: impaired mobility due to weakness and balance issues  Specific instructions for Next Treatment: Monitor sao2 with activity. instruct in strengthening and balance activities, instruct in standing balance and transfers/ gait  as LE strength improves  History: admitted due to acute encephalopathy  PT Education: Injury Prevention  Patient Education: chechking proper fit of compression stockings  REQUIRES PT FOLLOW UP: Yes  Activity Tolerance  Activity Tolerance: Patient limited by fatigue;Patient limited by endurance     Patient Diagnosis(es): There were no encounter diagnoses. has a past medical history of Backache, unspecified, CHF (congestive heart failure) ([facility] [address] Chronic kidney disease, Coronary atherosclerosis of artery bypass graft, Cramp of limb, Gallstones, Hypertension, Insomnia, Pneumonia, Type II or unspecified type diabetes mellitus with renal manifestations, not stated as uncontrolled(250.40), Type II or unspecified type diabetes mellitus without mention of complication, not stated as uncontrolled, and Unspecified vitamin D deficiency. has a past surgical history that includes Coronary artery bypass graft; Knee arthroscopy; Carpal tunnel release; Breast surgery; Tonsillectomy; Hand surgery; Ankle fracture surgery;  Cholecystectomy, open (N/A); and IR TUNNELED CVC PLACE WO SQ PORT/PUMP > 5 YEARS (8/18/2021). Restrictions  Restrictions/Precautions  Restrictions/Precautions: Fall Risk  Required Braces or Orthoses?: Yes  Implants present? :  (R upper chest port.)  Required Braces or Orthoses  Right Lower Extremity Brace:  (Lymphedema wraps)  Left Lower Extremity Brace:  (Lymphedema wraps)  Position Activity Restriction  Other position/activity restrictions: up w/ assist, Hemodialysis M / W / F     Subjective   General  Chart Reviewed: Yes  Additional Pertinent Hx: Per PM&R note: Lisa Huber is a 59 y.o. right-handed female with history of recent CVA, AURELIA on CKD (on hemodialysis), CAD, CHF, atrial fibrillation, HTN, type 2 diabetes admitted to [facility] on 9/13/2021. She initially presented following a syncopal episode in dialysis, after which she was reportedly confused. She has reported right lower limb weakness and heaviness. Imaging of the brain has been unremarkable. EEG showed mild encephalopathy with no epileptiform activity. Symptoms are thought to be secondary to hypoperfusion. Pulmonology has been consulted for new oxygen requirement overnight. She currently reports ongoing impaired memory and right-sided numbness/tingling and weakness. She thinks that symptoms are improving a little bit in the right upper limb. She also reports intermittent headache and dizziness. She notes shortness of breath with activity and decreased urine output as well. Pt admitted to rehab unit on 9/17/21  Response To Previous Treatment: Patient with no complaints from previous session. Family / Caregiver Present: No  Referring Practitioner: Dr. Gracy Clemente: Patient reported being frustrated about her memory loss and her current ability level (but she was very happy about AMB 130ft without stopping). Pt reported that her R hand felt like it was asleep.   Pain Screening  Patient Currently in Pain: Denies  Vital Signs  Patient Currently in Pain: Denies  Oxygen Therapy  O2 Device: None (Room air)       Orientation  Orientation  Overall Orientation Status: Within Functional Limits (Pt has trouble recalling memories)    Objective      Transfers  Sit to Stand: Stand by assistance  Stand to sit: Stand by assistance  Bed to Chair: Stand by assistance  Stand Pivot Transfers: Stand by assistance  Comment: Transfers completed with B UE support on RW. Ambulation  Ambulation?: Yes  Ambulation 1  Surface: level tile  Device: Rollator  Assistance: Stand by assistance  Quality of Gait: slight foward flexed posture with short step length, increase fatique noted with distance, pt required a rest break before further activity  Gait Deviations: Slow Annie; Increased ELISABET; Decreased step length;Decreased step height  Distance: 100ft & 130ft  Comments: Pt reported that her RLE felt very heavy  Stairs/Curb  Stairs?: Yes  Stairs  # Steps : 10  Stairs Height:  (4\"/6\")  Rails: Bilateral  Device: No Device  Assistance: Contact guard assistance  Comment: patient perfromed step over step pattern with no LOB      Balance  Posture: Fair  Sitting - Static: Good  Sitting - Dynamic: Fair;+  Standing - Static: Good  Standing - Dynamic: Fair;+  Comments: Standing balance completed with rollator. Other exercises  Other exercises?: Yes  Other exercises 1: seated B LE exercises x20 reps with #2 and blue tband   Other exercises 2: seated UBE 5 minutes FWD/BWD  Other exercises 3: NuStep L2 x10 minutes   Other exercises 4: stand pivot transfer x4            Goals  Short term goals  Time Frame for Short term goals: 5 days  Short term goal 1: pt to tolerate 1/2 hour to 45 minutes for therapuetic exercise and activity  Short term goal 2: pt to demonstrate good technique for LE strengthening and  balance activities  Short term goal 3: pt to demonstrate  independent bed mobility with out bed rail.   Short term goal 4: pt to demonstrate safe tech for transfers from varies surfaces, at supervsion level  Short term goal 5: pt able to ambulate with rollator distance fo 120 ft or more, SBA, level surfaces  Short term goal 6: pt able to go up and down 3 to 5 steps with 2 rails CGA to improve strength and endurance. Short term goal 7: pt to demonstrate goobalance during gait with rollator. Long term goals  Time Frame for Long term goals : By DC  Long term goal 1: pt able to demonstrate mod-I transfers  Long term goal 2:  pt able to ambulate safely on level as well as carpetted/outside terraine with rollator, atleast distance of 150 ft, mod-I  Long term goal 3: pt able to go up and down 5 steps with2 rails , SBA to improve endurance. Long term goal 4: Improve gait speed by demonstrating ability to ambulate 150 ft with rollator in 2 minutes to improve fucntion. Long term goal 5: Improve R LE strength by 1/3 MMG to improve fucntion. Patient Goals   Patient goals : get better    Plan    Plan  Times per week: 900 minutes/week for combined therapy of PT/OT/ST due to decreased tolerance to activity and Hemodialysis. Specific instructions for Next Treatment: Monitor sao2 with activity. instruct in strengthening and balance activities, instruct in standing balance and transfers/ gait  as LE strength improves  Current Treatment Recommendations: Strengthening, Functional Mobility Training, Equipment Evaluation, Education, & procurement, Safety Education & Training, Gait Training, Transfer Training, Balance Training, Endurance Training, Positioning, Patient/Caregiver Education & Training, Stair training, Neuromuscular Re-education  Safety Devices  Type of devices:  All fall risk precautions in place, Gait belt, Call light within reach, Patient at risk for falls, Nurse notified, Left in chair     Therapy Time     09/20/21 0938 09/20/21 1220   PT Individual Minutes   Time In [address]   Time Out 1037 1301   Minutes 61 20 [facility]

## 2021-09-20 NOTE — PLAN OF CARE
Problem: Falls - Risk of:  Goal: Will remain free from falls  Description: Will remain free from falls  9/20/2021 1514 by Bg Guzmán RN  Outcome: Ongoing  9/20/2021 0208 by Juanita Lang RN  Outcome: Ongoing  Goal: Absence of physical injury  Description: Absence of physical injury  9/20/2021 1514 by Bg Guzmán RN  Outcome: Ongoing  9/20/2021 0208 by Juanita Lang RN  Outcome: Ongoing     Problem: Skin Integrity:  Goal: Will show no infection signs and symptoms  Description: Will show no infection signs and symptoms  9/20/2021 1514 by Bg Guzmán RN  Outcome: Ongoing  9/20/2021 0208 by Juanita Lang RN  Outcome: Ongoing  Goal: Absence of new skin breakdown  Description: Absence of new skin breakdown  9/20/2021 1514 by Bg Guzmán RN  Outcome: Ongoing  9/20/2021 0208 by Juanita Lang RN  Outcome: Ongoing     Problem: SAFETY  Goal: LTG - Patient will demonstrate safety requirements appropriate to situation/environment  9/20/2021 1514 by Bg Guzmán RN  Outcome: Ongoing  9/20/2021 0208 by Juanita Lang RN  Outcome: Ongoing  Goal: LTG - patient will utilize safety techniques  9/20/2021 1514 by Bg Guzmán RN  Outcome: Ongoing  9/20/2021 0208 by Juanita Lang RN  Outcome: Ongoing  Goal: STG - patient locks brakes on wheelchair  9/20/2021 1514 by Bg Guzmán RN  Outcome: Ongoing  9/20/2021 0208 by Juanita Lang RN  Outcome: Ongoing  Goal: STG - Patient uses call light consistently to request assistance with transfers  9/20/2021 1514 by Bg Guzmán RN  Outcome: Ongoing  9/20/2021 0208 by Juanita Lang RN  Outcome: Ongoing     Problem: Musculor/Skeletal Functional Status  Goal: Highest potential functional level  9/20/2021 1514 by Bg Guzmán RN  Outcome: Ongoing  9/20/2021 0208 by Juanita Lang RN  Outcome: Ongoing  Goal: Absence of falls  9/20/2021 1514 by Bg Guzmán RN  Outcome: Ongoing  9/20/2021 0208 by Deborah Ortiz RN  Outcome: Ongoing     Problem: Pain:  Goal: Pain level will decrease  Description: Pain level will decrease  9/20/2021 1514 by Sofía Arrieta RN  Outcome: Ongoing  9/20/2021 0208 by Deborah Ortiz RN  Outcome: Ongoing  Goal: Control of acute pain  Description: Control of acute pain  9/20/2021 1514 by Sofía Arrieta RN  Outcome: Ongoing  9/20/2021 0208 by Deborah Ortiz RN  Outcome: Ongoing  Goal: Control of chronic pain  Description: Control of chronic pain  9/20/2021 1514 by Sofía Arrieta RN  Outcome: Ongoing  9/20/2021 0208 by Deborah Ortiz RN  Outcome: Ongoing     Problem: Nutrition  Goal: Optimal nutrition therapy  9/20/2021 1514 by Sofía Arrieta RN  Outcome: Ongoing  9/20/2021 0208 by Deborah Ortiz RN  Outcome: Ongoing     Problem: Musculor/Skeletal Functional Status  Goal: Highest potential functional level  9/20/2021 1514 by Sofía Arrieta RN  Outcome: Ongoing  9/20/2021 0208 by Deborah Ortiz RN  Outcome: Ongoing  Goal: Absence of falls  9/20/2021 1514 by Sofía Arrieta RN  Outcome: Ongoing  9/20/2021 0208 by Deborah Ortiz RN  Outcome: Ongoing

## 2021-09-20 NOTE — CARE COORDINATION
CASE MANAGEMENT NOTE:    Admission Date:  9/17/2021 Jill Harrell is a 59 y.o.  female    Admitted for : Encephalopathy [G93.40]       Met with:  Patient        PCP:  AFSANEH Peña                               Insurance:  [employer]       Current Residence/ Living Arrangements:  independently at home              Current Services PTA:  [facility] and [facility] MWF @ 11:15 AM     Is patient agreeable to VNS: Yes     Freedom of choice provided: Yes     List of [address] provided: No     Home Health/ Out pt therapy chosen:  Yes- [facility].      DME:  4 w/w, Straight Cane, built shower chair     Home Oxygen: No     Nebulizer: No     CPAP/BIPAP: No     Supplier: N/A     Potential Assistance Needed: No     SNF needed: No     Freedom of choice and list provided: NA     Pharmacy:  [facility]         Does Patient want to use MEDS to BEDS? Yes     Is patient currently receiving oral anticoagulation therapy? Yes- eliquis     Is the Patient an [facility] with Readmission Risk Score greater than 14%? Yes- 22%  If yes, pt needs a follow up appointment made within 7 days.     Family Members/Caregivers that pt would like involved in their care: Yes     If yes, list name here:  Wilmar Villalobos and [facility] [address]  Transportation Provider:  Family         Handicap placard:  Pt has handicap placard           Is patient in Isolation/One on One/Altered Mental Status? No  If yes, skip next question. If no, would they like an I-Pad to  use? No  If yes, call [phone].                 Mental Health History: Pt identified anxiety and is currently taking medications prescribed by PCP     Substance use: Pt denied     Discharge Plan:  Pt lives independently at home and her mother lives with her. Her father lives at [facility] and she was visiting with him daily. Pt has 2 sisters who are helpful and supportive.  Pt [facility]                   Patient Health Questionnaire-9 (PHQ-9)    Over the last 2 weeks, how often have you been bothered by any of the following problems? 1. Little Interest or pleasure in doing things? [x] Not at all  [] Several Days  [] More than half the day  []  Nearly every day    2. Feeling down, depressed or hopeless? [] Not at all  [x] Several Days  [] More than half the day  []  Nearly every day    3. Trouble falling or staying asleep, or sleeping too much? [x] Not at all  [] Several Days  [] More than half the day  []  Nearly every day    4. Feeling tired or having little energy? [] Not at all  [] Several Days  [x] More than half the day  []  Nearly every day    5. Poor apettite or overeating? [x] Not at all  [] Several Days  [] More than half the day  []  Nearly every day    6. Feeling bad about yourself-or that you are a failure or have let yourself or your family down? [x] Not at all  [] Several Days  [] More than half the day  []  Nearly every day    7. Trouble concentrating on things, such as reading the newspaper or watching television? [x] Not at all  [] Several Days  [] More than half the day  [x]  Nearly every day    8. Moving or speaking so slowly that other people could have noticed? Or the opposite-being so fidgety or restless that you have been moving around a lot more than usual?   [] Not at all  [x] Several Days  [] More than half the day  []  Nearly every day    9. Thoughts that you would be better off dead or of hurting yourself in some way? [x] Not at all  [] Several Days  [] More than half the day  []  Nearly every day    Total Score: 7 mild s/s of depression. Pt denied SI or thoughts of self harm. If you checked off any problems, how difficult have these problems made it for you to do your work, take care of things at home, or get along with other people?    [] Not difficult at all  [x] Somewhat Difficult  [] Very Difficult  []  Extremely Difficult       Electronically signed by: AZ French on 9/20/2021 at 8:36 AM

## 2021-09-20 NOTE — FLOWSHEET NOTE
09/20/21 1800   Vital Signs   BP (!) 117/44   Temp 97.5 °F (36.4 °C)   Pulse 55   Resp 16   Weight 239 lb 3.2 oz (108.5 kg)   Weight Method Bed scale   Percent Weight Change -2.16   Post-Hemodialysis Assessment   Post-Treatment Procedures Blood returned;Catheter capped, clamped with Citrate x 2 ports   Machine Disinfection Process Acid/Vinegar Clean;Heat Disinfect; Exterior Machine Disinfection   Rinseback Volume (ml) 500 ml   Total Liters Processed (l/min) 64.5 l/min   Dialyzer Clearance Lightly streaked   Duration of Treatment (minutes) 210 minutes   NET Removed (ml) 2000 ml   Tolerated Treatment Good   Bilateral Breath Sounds Clear   Edema Generalized;Right lower extremity; Left lower extremity   Edema Generalized +1;Non-pitting   RUE Edema +1;Non-pitting   Completed 3.5 hr HD TX and removed 2 Liters of fluid, pt tolerated HD TX well. Gave Albumin 25%, 25g for BP support during HD TX.  cvc dressing changed and is clean, dry and intact. report given to primary nurse, Lennox Jarvis.

## 2021-09-20 NOTE — PROGRESS NOTES
Speech Language Pathology  Speech Language Pathology  [facility] Acute Rehab Unit at [address] [place] [address]  Cognitive/Speech/Language  Treatment Note    Date: 9/20/2021  Patients Name: Edwin Singh  MRN: [id number]  Diagnosis: Cognitive impairment   Patient Active Problem List   Diagnosis Code    Coronary artery disease I25.10    Chronic diastolic heart failure (HCC) I50.32    Diabetic polyneuropathy associated with type 2 diabetes mellitus (HCC) E11.42    History of coronary artery bypass graft Z95.1    Iron deficiency anemia D50.9    Spinal stenosis of lumbar region with neurogenic claudication M48.062    Mixed hyperlipidemia E78.2    Stage 4 chronic kidney disease (HCC) N18.4    Type 2 diabetes mellitus with kidney complication, with long-term current use of insulin ([facility]) E11.29, Z79.4    Syncope and collapse R55    Obesity, Class II, BMI 35-39.9 E66.9    Thyroid nodule greater than or equal to 1 cm in diameter incidentally noted on imaging study E04.1    Essential hypertension I10    Anemia in stage 4 chronic kidney disease (HCC) N18.4, D63.1    Chronic ischemic heart disease I25.9    Acute cerebrovascular accident (CVA) ([facility] [address] I63.9    Stroke-like symptoms R29.90    Morbid obesity with BMI of 40.0-44.9, adult ([facility]) E66.01, Z68.41    Acute encephalopathy G93.40    Encephalopathy G93.40    Debility R53.81       Pain: 0/10    Cognitive Treatment    Treatment time:  3291-8526    Subjective: [x] Alert [x] Cooperative     [] Confused     [] Agitated    [] Lethargic    Objective/Assessment:  Attention: functional throughout    Recall/Problem solving: Pt. Continues to use notebook to record information when learns of memory or concept she had forgotten (ie, pt. Sharing c ST re: concept of holidays which she  learned about in conversation c her sisters yesterday). Missing equipment- 94%, 100% c cues. Word deductions- 100%. Pt. Asking Felicia Nieves is a birthday? \"     Other: Word finding:   Pt.  Minerva circumlocuting when attempting to find word/explain process. Pt. Demonstrated  long response latency across all tasks. Plan:  [x] Continue ST services    [] Discharge from ST:      Discharge recommendations: [] Inpatient Rehab   [] [place]   [] Outpatient Therapy  [] Follow up at trauma clinic   [] Other:       Treatment completed by: Gricel Christianson A.CCC/SLP

## 2021-09-21 LAB
GLUCOSE BLD-MCNC: 123 MG/DL (ref 65–105)
GLUCOSE BLD-MCNC: 232 MG/DL (ref 65–105)
GLUCOSE BLD-MCNC: 263 MG/DL (ref 65–105)
GLUCOSE BLD-MCNC: 337 MG/DL (ref 65–105)

## 2021-09-21 PROCEDURE — 6370000000 HC RX 637 (ALT 250 FOR IP): Performed by: FAMILY MEDICINE

## 2021-09-21 PROCEDURE — 99232 SBSQ HOSP IP/OBS MODERATE 35: CPT | Performed by: STUDENT IN AN ORGANIZED HEALTH CARE EDUCATION/TRAINING PROGRAM

## 2021-09-21 PROCEDURE — 82947 ASSAY GLUCOSE BLOOD QUANT: CPT

## 2021-09-21 PROCEDURE — 97129 THER IVNTJ 1ST 15 MIN: CPT

## 2021-09-21 PROCEDURE — 1180000000 HC REHAB R&B

## 2021-09-21 PROCEDURE — 97530 THERAPEUTIC ACTIVITIES: CPT

## 2021-09-21 PROCEDURE — 99232 SBSQ HOSP IP/OBS MODERATE 35: CPT | Performed by: FAMILY MEDICINE

## 2021-09-21 PROCEDURE — 6370000000 HC RX 637 (ALT 250 FOR IP): Performed by: PHYSICAL MEDICINE & REHABILITATION

## 2021-09-21 PROCEDURE — 97116 GAIT TRAINING THERAPY: CPT

## 2021-09-21 PROCEDURE — 97535 SELF CARE MNGMENT TRAINING: CPT

## 2021-09-21 PROCEDURE — 97130 THER IVNTJ EA ADDL 15 MIN: CPT

## 2021-09-21 PROCEDURE — 97110 THERAPEUTIC EXERCISES: CPT

## 2021-09-21 PROCEDURE — 92507 TX SP LANG VOICE COMM INDIV: CPT

## 2021-09-21 RX ADMIN — BUSPIRONE HYDROCHLORIDE 7.5 MG: 7.5 TABLET ORAL at 20:44

## 2021-09-21 RX ADMIN — FUROSEMIDE 80 MG: 40 TABLET ORAL at 10:00

## 2021-09-21 RX ADMIN — INSULIN LISPRO 4 UNITS: 100 INJECTION, SOLUTION INTRAVENOUS; SUBCUTANEOUS at 17:20

## 2021-09-21 RX ADMIN — RANOLAZINE 1000 MG: 1000 TABLET, FILM COATED, EXTENDED RELEASE ORAL at 20:43

## 2021-09-21 RX ADMIN — GABAPENTIN 300 MG: 300 CAPSULE ORAL at 10:01

## 2021-09-21 RX ADMIN — ACETAMINOPHEN 650 MG: 325 TABLET, FILM COATED ORAL at 06:24

## 2021-09-21 RX ADMIN — CARVEDILOL 6.25 MG: 6.25 TABLET, FILM COATED ORAL at 10:00

## 2021-09-21 RX ADMIN — INSULIN GLARGINE 53 UNITS: 100 INJECTION, SOLUTION SUBCUTANEOUS at 20:44

## 2021-09-21 RX ADMIN — TRAZODONE HYDROCHLORIDE 75 MG: 150 TABLET ORAL at 20:43

## 2021-09-21 RX ADMIN — PANTOPRAZOLE SODIUM 40 MG: 40 TABLET, DELAYED RELEASE ORAL at 06:24

## 2021-09-21 RX ADMIN — APIXABAN 5 MG: 5 TABLET, FILM COATED ORAL at 10:00

## 2021-09-21 RX ADMIN — BUSPIRONE HYDROCHLORIDE 7.5 MG: 7.5 TABLET ORAL at 10:02

## 2021-09-21 RX ADMIN — CARVEDILOL 6.25 MG: 6.25 TABLET, FILM COATED ORAL at 20:43

## 2021-09-21 RX ADMIN — FEBUXOSTAT 40 MG: 40 TABLET, FILM COATED ORAL at 10:02

## 2021-09-21 RX ADMIN — TAMSULOSIN HYDROCHLORIDE 0.4 MG: 0.4 CAPSULE ORAL at 10:02

## 2021-09-21 RX ADMIN — INSULIN GLARGINE 53 UNITS: 100 INJECTION, SOLUTION SUBCUTANEOUS at 10:04

## 2021-09-21 RX ADMIN — GABAPENTIN 300 MG: 300 CAPSULE ORAL at 20:43

## 2021-09-21 RX ADMIN — INSULIN LISPRO 6 UNITS: 100 INJECTION, SOLUTION INTRAVENOUS; SUBCUTANEOUS at 11:41

## 2021-09-21 RX ADMIN — POLYETHYLENE GLYCOL 3350 17 G: 17 POWDER, FOR SOLUTION ORAL at 10:01

## 2021-09-21 RX ADMIN — ATORVASTATIN CALCIUM 80 MG: 80 TABLET, FILM COATED ORAL at 10:00

## 2021-09-21 RX ADMIN — FUROSEMIDE 80 MG: 40 TABLET ORAL at 17:20

## 2021-09-21 RX ADMIN — RANOLAZINE 1000 MG: 1000 TABLET, FILM COATED, EXTENDED RELEASE ORAL at 10:02

## 2021-09-21 RX ADMIN — APIXABAN 5 MG: 5 TABLET, FILM COATED ORAL at 20:43

## 2021-09-21 RX ADMIN — INSULIN LISPRO 4 UNITS: 100 INJECTION, SOLUTION INTRAVENOUS; SUBCUTANEOUS at 20:44

## 2021-09-21 RX ADMIN — ASPIRIN 81 MG CHEWABLE TABLET 81 MG: 81 TABLET CHEWABLE at 10:00

## 2021-09-21 ASSESSMENT — PAIN SCALES - GENERAL
PAINLEVEL_OUTOF10: 0
PAINLEVEL_OUTOF10: 0

## 2021-09-21 ASSESSMENT — PAIN SCALES - WONG BAKER
WONGBAKER_NUMERICALRESPONSE: 2
WONGBAKER_NUMERICALRESPONSE: 4

## 2021-09-21 ASSESSMENT — PAIN DESCRIPTION - LOCATION: LOCATION: FOOT

## 2021-09-21 ASSESSMENT — PAIN DESCRIPTION - PAIN TYPE: TYPE: ACUTE PAIN

## 2021-09-21 NOTE — CARE COORDINATION
Marika Cheng, RN   Registered Nurse   Case Management   Progress Notes      Signed   Date of Service:  2021  3:09 PM         Related encounter: ED to Hosp-Admission (Discharged) from 2021 in [place] [address]  Acute Inpatient Rehab Preadmission Assessment     Patient Name: Ian Max        MRN:   [id number]    : 1960  (59 y.o.)  Gender: female      Admitted from:   [x]? [facility]  []? [facility] [address]   []? [facility]   []? [facility]   []? Outside Admission - Location:                                 [x]? Initial         []? Updated     Date of Onset / Admission to the acute hospital:  21     Inpatient Rehabilitation Admitting Diagnosis:  Encephalopathy     Did patient have surgery/procedures? []? No  [x]? Yes:       Procedure: Tunneled dialysis catheter placement     Physicians: Xochilt Ramirez     Risk for clinical complications:  Mild to Moderate     Co-morbidities:       1. Recrudescence of right-sided stroke symptoms  2. Anemia  3. Thrombocytopenia  4. AURELIA on CKD, on hemodialysis  5. Recent CVA  6. CAD  7. CHF  8. Atrial fibrillation  9. HTN  10. Type 2 diabetes  11. Obesity     Financial Information  Primary insurance:  []? Medicare     []? Medicare HMO      [x]? [employer]    []? Medicaid      []? Medicaid HMO       []? Workers Compensation        []? Personal Pay     Secondary Insurance:  []? Medicare     []? Medicare HMO      []? [employer]    []? Medicaid      []? Medicaid HMO        []? Workers Compensation      [x]? None     Precautions:   []? Cardiac Precautions:            []? Total hip precautions:           []? Weight Bearing status:  [x]? Safety Precautions/Concerns:  Fall Risk, General Precautions  [x]? Visually impaired:   Wears glasses for reading        []? Hard of Hearing:      Isolation Precautions:         [x]? Yes              []?No  If Yes:   []? Droplet  [x]? Contact           []? Airborne []?VRE     [x]? MRSA        []? C-diff         []? TB             []? ESBL         []? MDRO          []? Other:                        Physiatrist:  []? Dr. Omar Cannon     []? Dr. Alley Williamson  [x]? Dr. Marybel Sagastume  []? Dr. Reyes Rail     Patients Occupation: Disabled     Reviewed Lab and Diagnostic reports from Current Admission: Yes     Patients Prior Functional  Level: Prior Function  ADL Assistance: Independent  Homemaking Assistance: Needs assistance (Pt. uses motorized cart at the grocery store, sister does grocery shopping typically. , pt preparing meals for herself and her mother)  Ambulation Assistance: Independent (using wheeled walker since D/C, limited distances due to back & neck pain)  Transfer Assistance: Independent  Additional Comments: Pt recently discharged from Rehab at [facility] on 8- w/ home health was set up for PT, OT and Speech. Pt was going to dialysis 3 X/ week. .  Pt is a confused- above information was taken from PT evaluation dated 8-. Pt.'s mother becoming less mobile, pt.'s sister helps out mother. Pt. reports that 2 sisters can assist. Pt.s father in [facility] home. Pt reports that her 2 sisters live close by and that her one sister is retired and able to assist as needed.     History of current illness, per PM&R Consult:  Omega Garcia is a 59 y. o. right-handed female with history of recent CVA, AURELIA on CKD (on hemodialysis), CAD, CHF, atrial fibrillation, HTN, type 2 diabetes admitted to [facility] 9/13/2021.       She initially presented following a syncopal episode in dialysis, after which she was reportedly confused. Marva Cochran has reported right lower limb weakness and heaviness.  Imaging of the brain has been unremarkable.   EEG showed mild encephalopathy with no epileptiform activity.  Symptoms are thought to be secondary to hypoperfusion.  Pulmonology has been consulted for new oxygen requirement overnight.     She is known to our service from acute rehab admission following recent CVA.     She currently reports ongoing impaired memory and right-sided numbness/tingling and weakness.  She thinks that symptoms are improving a little bit in the right upper limb.  She also reports intermittent headache and dizziness.  She notes shortness of breath with activity and decreased urine output as well.     Prognosis: Fair     Current functional status for upper extremity ADLs:  UE Bathing: Minimal assistance  UE Dressing: Minimal assistance     Current functional status for lower extremity ADLs:  LE Bathing: Minimal assistance  LE Dressing: Minimal assistance     Current functional status for bed, chair, wheelchair transfers:  Transfers  Sit to Stand: Supervision  Stand to sit: Supervision  Bed to Chair: Supervision  Stand Pivot Transfers: Supervision  Comment: pt stood next to the bed for less than 1 minute- pt's right knee buckling w/ standing- Pt fearful of falling and C/O weakness and heaviness of right LE     Current functional status for toilet transfers:   Toilet Transfers  Toilet - Technique: Ambulating  Equipment Used: Grab bars  Toilet Transfer: Contact guard assistance  Toilet Transfers Comments: Verbal cues for hand placement and safety     Current functional status for locomotion:  Ambulation  Ambulation?: Yes  Ambulation 1  Surface: level tile  Device: Rolling Walker  Assistance: Stand by assistance  Quality of Gait: slight foward flexed posture with short step lengthb   Gait Deviations: Slow Annie, Increased ELISABET, Decreased step length, Decreased step height  Distance: 10ft from bed to toilet; 20ft from toilet to bedside chair in AM; 47ft in PM   Comments: patient less fearful this PM. Able to amb. distanvce with no buckeling of R knee      Current functional status for comprehension: Complete independence     Current functional status for expression: Minimal contact assistance     Current functional status for social interaction: Complete independence     Current functional status for problem solving: Minimal contact assistance     Current functional status for memory: Moderate assistance     Current Deficits R/T Impairment: Impaired Functional Mobility and Decreased ADLs     Required Therapy:   [x]? Physical Therapy  [x]? Occupational Therapy   [x]? Speech Therapy, as appropriate     Additional Services:    [x]?     []? Recreational Therapy, as appropriate    [x]? Nutrition    [x]? Dialysis  []? Cultural Needs Identified  []? Special Equipment Needs  [x]? Other:  BLE Lymphedema Wraps     Rehab Justification:  Needs 3 hrs therapy per day or 15 hours per week:  Yes  Identified Rehab Nursing needs: Yes  Intense Interdisciplinary need:  Yes  Need for 24 hr physician supervision:  Yes  Measurable improved quality of life:  Yes  Willingness to participate:  Yes  Medical Necessity:  Yes  Patient able to tolerate care proposed:   Yes     Expected Discharge Destination/Functional Level:  Home with assist  Expected length of time to achieve that level of improvement: 1-2 weeks  Expected Post Discharge Treatments: Home with possible Home Care     Other information relevant to patient's care needs:  N/A     Acute Inpatient Rehabilitation Disclosure Statement will be provided to patient upon admission to ARU with patient's verbalization of understanding.       I have reviewed and concur with the findings and results of the pre-admission screening assessment completed by the Inpatient Rehabilitation Admissions Coordinator.                  Cosigned by: Mikala Rust MD at 9/17/2021  3:30 PM

## 2021-09-21 NOTE — PLAN OF CARE
Individualized Plan of Care  [facility]  [facility]    Rehabilitation physician: Dr. Naomi Linares Date: 9/17/2021     Rehabilitation Diagnosis: Encephalopathy [G93.40]     Rehabilitation impairments: self care, mobility, motor dysfunction and cognitive function    Factors facilitating achievement of predicted outcomes: Family support, Motivated, Cooperative and Pleasant  Barriers to the achievement of predicted outcomes: Cognitive deficit, Decreased endurance, Decreased sensation, Upper extremity weakness, Lower extremity weakness, Medical complications, Skin Care and Medication managment    Patient Goals: Improve independence with mobility, Increase overall strength and endurance, Increase balance, Increase endurance, Increase independence with activities of daily living, Improve cognition, Assure adequate nutritional option for discharge and Provide appropriate patient and family education      NURSING:  Nursing goals for Vanessa Bras while on the rehabilitation unit will include:  Adequate number of bowel movements, Urinate with no urinary retention >300ml in bladder, Maintain O2 SATs at an acceptable level during stay, Absence of skin breakdown while on the rehabilitation unit, Avoidance of any hospital acquired infections, Freedom from injury during hospitalization and Complete education with patient/family with understanding demonstrated regarding disease process and resultant impairment     In order to achieve these goals, nursing interventions may include bowel/bladder training, education for medical assistive devices, medication education, O2 saturation management, energy conservation, stress management techniques, fall prevention, alarms protocol, seating and positioning, skin/wound care, pressure relief instruction, dressing changes, infection protection, DVT prophylaxis, assistance with safe transfers , and/or assistance with bathroom activities and hygiene.        PHYSICAL THERAPY:  Goals:        Short term goals  Time Frame for Short term goals: 5 days  Short term goal 1: pt to tolerate 1/2 hour to 45 minutes for therapuetic exercise and activity  Short term goal 2: pt to demonstrate good technique for LE strengthening and  balance activities  Short term goal 3: pt to demonstrate  independent bed mobility with out bed rail. Short term goal 4: pt to demonstrate safe tech for transfers from varies surfaces, at supervsion level  Short term goal 5: pt able to ambulate with rollator distance fo 120 ft or more, SBA, level surfaces  Short term goal 6: pt able to go up and down 3 to 5 steps with 2 rails CGA to improve strength and endurance. Short term goal 7: pt to demonstrate goobalance during gait with rollator. Long term goals  Time Frame for Long term goals : By DC  Long term goal 1: pt able to demonstrate mod-I transfers  Long term goal 2:  pt able to ambulate safely on level as well as carpetted/outside terraine with rollator, atleast distance of 150 ft, mod-I  Long term goal 3: pt able to go up and down 5 steps with2 rails , SBA to improve endurance. Long term goal 4: Improve gait speed by demonstrating ability to ambulate 150 ft with rollator in 2 minutes to improve fucntion. Long term goal 5: Improve R LE strength by 1/3 MMG to improve fucntion. Plan of Care:  Due to impaired functional endurance and/or medical issues, the patient is to be seen for a combined total of at least a  900 minutes over 7 days of Physical, Occupational and/or Speech Therapy. Anticipated interventions may include therapeutic exercises, gait training, neuromuscular re-ed, transfer training, community reintegration, bed mobility, w/c mobility and training. OCCUPATIONAL THERAPY:  Goals:             Short term goals  Time Frame for Short term goals: 1 week  Short term goal 1: pt to use BUE to cut food successfully and feed self with RUE mod indep.   Short term goal 2: SBA ambulate to obtain set up for adls. Short term goal 3: SBA LE bathe and dress, able to don compression stockings with own device with min assist  Short term goal 4: ilana 9-11 min stand, ambulate with SBA and good safety for adls  Short term goal 5: ilana 10 reps x 3 R shld AROM achieving 95 degrees, ilana 10 reps x 3 R hand  strengthening. Long term goals  Time Frame for Long term goals : by discharge  Long term goal 1: mod indep ambulate to obtain set up for adls ([employer])  Long term goal 2: mod indep self care and toileting  Long term goal 3: tolerate 15-17 min stand, ambulate for adls mod indep with good safety. Long term goal 4: indep RUE HEP  Long term goal 5: increase R shoulder AROM to 100 and MMT to 3+ and R  to 20 pounds to improve use of RUE for adls. Plan of Care:  Due to impaired functional endurance and/or medical issues, the patient is to be seen for a combined total of at least a  900 minutes over 7 days of Physical, Occupational and/or Speech Therapy. Anticipated interventions may include ADL and IADL retraining, strengthening, safety education and training, patient/caregiver education and training, equipment evaluation/ training/procurement, neuromuscular reeducation, wheelchair mobility training. SPEECH THERAPY:   Goals:             Short-term Goals  Goal 1: Increase problem solving and reasoning to 90%  Goal 2: Increase word finding in conversation and in structured tasks to 90%. Plan of Care:  Due to impaired functional endurance and/or medical issues, the patient is to be seen for a combined total of at least a  900 minutes over 7 days of Physical, Occupational and/or Speech Therapy. Anticipated interventions may include speech/language/communication therapy, cognitive training, group therapy, education, and/or dysphagia therapy based on the above goals.       CASE MANAGEMENT:  Goals:   Assist patient/family with discharge planning, patient/family counseling,  and coordination with insurance during the inpatient rehabilitation stay. Other members of the multidisciplinary rehabilitation team that will be involved in the patient's plan of care include dietary, respiratory therapy. Medical issues being managed closely and that require 24 hour availability of a physician:  Bowel/Bladder function, Infection protection, DVT prophylaxis, Fall precautions, Fluid/Electrolyte balance, Nutritional status, Respiratory needs, Anemia and History of heart disease                                           Physician anticipated functional outcomes: Improved independence with functional measures   Estimated length of stay for this admission:  1-2 weeks  Medical Prognosis: Fair  Anticipated disposition: Home. The potential to achieve the above medical and rehabilitative goals is fair. This plan of care has been developed with the assistance and input of the multidisciplinary rehabilitation team.  The plan was reviewed with the patient on 9/20/2021. The patient has had the opportunity to provide input to the therapy team.    I have reviewed this Individualized Plan of Care and agree with its contents. Above documentation has been expanded, modified, adjusted to reflect the findings of my evaluations and goals for the patient.     Physician:  Electronically signed by Dorothy Morales MD on 9/20/21 at 8:50 PM EDT

## 2021-09-21 NOTE — PROGRESS NOTES
Speech Language Pathology  Speech Language Pathology  [facility] Acute Rehab Unit at [facility]  Cognitive/Speech/Language  Treatment Note    Date: 9/21/2021  Patients Name: Jamel Clayton  MRN: [id number]  Diagnosis: Cognitive impairment   Patient Active Problem List   Diagnosis Code    Coronary artery disease I25.10    Chronic diastolic heart failure (HCC) I50.32    Diabetic polyneuropathy associated with type 2 diabetes mellitus (HCC) E11.42    History of coronary artery bypass graft Z95.1    Iron deficiency anemia D50.9    Spinal stenosis of lumbar region with neurogenic claudication M48.062    Mixed hyperlipidemia E78.2    Stage 4 chronic kidney disease (HCC) N18.4    Type 2 diabetes mellitus with kidney complication, with long-term current use of insulin ([facility]) E11.29, Z79.4    Syncope and collapse R55    Obesity, Class II, BMI 35-39.9 E66.9    Thyroid nodule greater than or equal to 1 cm in diameter incidentally noted on imaging study E04.1    Essential hypertension I10    Anemia in stage 4 chronic kidney disease (HCC) N18.4, D63.1    Chronic ischemic heart disease I25.9    Acute cerebrovascular accident (CVA) ([facility] [address] I63.9    Stroke-like symptoms R29.90    Morbid obesity with BMI of 40.0-44.9, adult ([facility]) E66.01, Z68.41    Acute encephalopathy G93.40    Encephalopathy G93.40    Debility R53.81       Pain: 0/10    Cognitive Treatment    Treatment time:  1522-0349    Subjective: [x] Alert [x] Cooperative     [] Confused     [] Agitated    [] Lethargic    Objective/Assessment:  Attention: functional throughout    Recall/Problem solving: Pt. Continues to use notebook to record information when learns of memory or concept she had forgotten. 3 word memory and mental manipulation- 95%, 100% c cues. Paragraph recall- 100%. 4 step written ADL sequencing- very min A. Other: Word finding: Name 8-10 items in category- 100% x1, 70%, 100% c cues x1.     Pt. Minerva circumlocuting when attempting to find

## 2021-09-21 NOTE — PROGRESS NOTES
FAMILY MEDICINE  - PROGRESS NOTE    Date:  9/21/2021  Hayden Trevizo  [id number]      Chief complaint: Debility      Interval History:  Improved, she reports that she is still recovering memories although slowly. She is progressing in therapies. She has no new complaints. Specialists notes, labs & imaging reviewed.       Subjective  Constitutional: positive for obesity  Cardiovascular: positive for lower extremity edema  Musculoskeletal:positive for arthralgias, back pain and myalgias  Neurological: positive for memory problems  Endocrine: positive for diabetic symptoms including neuropathy and hyperglycemia:    Objective:    BP (!) 130/58   Pulse 59   Temp 97.9 °F (36.6 °C) (Oral)   Resp 18   Ht 5' 5\" (1.651 m)   Wt 239 lb 3.2 oz (108.5 kg)   SpO2 98%   BMI 39.80 kg/m²   General appearance - alert, well appearing, and in no distress and overweight  Mental status - alert, oriented to person, place, and time  Eyes - pupils equal and reactive, extraocular eye movements intact  Ears - hearing grossly normal bilaterally  Nose - normal and patent, no erythema, discharge or polyps  Mouth - mucous membranes moist, pharynx normal without lesions  Neck - supple, no significant adenopathy  Lymphatics - no palpable lymphadenopathy, no hepatosplenomegaly  Chest - clear to auscultation, no wheezes, rales or rhonchi, symmetric air entry  Heart - normal rate, regular rhythm, normal S1, S2, no murmurs, rubs, clicks or gallops  Abdomen - soft, nontender, nondistended, no masses or organomegaly  Breasts - not examined  Back exam - not examined  Neurological - alert, oriented, normal speech, no focal findings or movement disorder noted  Musculoskeletal - osteoarthritic changes noted in both hands  Extremities - pedal edema trace +  Skin - normal coloration and turgor, no rashes, no suspicious skin lesions noted    Data:   Medications:   Current Facility-Administered Medications   Medication Dose Route Frequency Provider Last Rate Last Admin    0.9 % sodium chloride infusion  25 mL IntraVENous PRN Yang Wolf MD        ondansetron (ZOFRAN-ODT) disintegrating tablet 4 mg  4 mg Oral Q8H PRN Yang Wolf MD        sodium chloride flush 0.9 % injection 5-40 mL  5-40 mL IntraVENous PRN Yang Wolf MD        potassium chloride (KLOR-CON M) extended release tablet 40 mEq  40 mEq Oral PRN Yang Wolf MD        Or    potassium bicarb-citric acid (EFFER-K) effervescent tablet 40 mEq  40 mEq Oral PRN Yang Wolf MD        Or    potassium chloride 10 mEq/100 mL IVPB (Peripheral Line)  10 mEq IntraVENous PRN Yang Wolf MD        apixaban Yara Gaudier) tablet 5 mg  5 mg Oral BID Yang Wolf MD   5 mg at 09/20/21 2136    aspirin chewable tablet 81 mg  81 mg Oral Daily Yang Wolf MD   81 mg at 09/20/21 9741    atorvastatin (LIPITOR) tablet 80 mg  80 mg Oral Daily Yang Wolf MD   80 mg at 09/20/21 9693    busPIRone (BUSPAR) tablet 7.5 mg  7.5 mg Oral TID Yang Wolf MD   7.5 mg at 09/20/21 2137    carvedilol (COREG) tablet 6.25 mg  6.25 mg Oral BID Yang Wolf MD   6.25 mg at 09/20/21 2137    dextrose 5 % solution  100 mL/hr IntraVENous PRN Yang Wolf MD        dextrose 50 % IV solution  12.5 g IntraVENous PRN Yang Wolf MD        febuxostat (ULORIC) tablet 40 mg  40 mg Oral Daily Yang Wolf MD   40 mg at 09/20/21 0914    ferrous sulfate (IRON 325) tablet 325 mg  325 mg Oral BID WC Yang Wolf MD   325 mg at 09/20/21 0906    furosemide (LASIX) tablet 80 mg  80 mg Oral BID Yang Wolf MD   80 mg at 09/20/21 3657    gabapentin (NEURONTIN) capsule 300 mg  300 mg Oral BID Yang Wolf MD   300 mg at 09/20/21 2136    glucagon (rDNA) injection 1 mg  1 mg IntraMUSCular PRN Yang Wolf MD        glucose (GLUTOSE) 40 % oral gel 15 g  15 g Oral PRN Yang Wolf MD        insulin glargine (LANTUS) injection vial 53 Units  53 Units SubCUTAneous BID Bety Bass MD   53 Units at 09/20/21 2152    insulin lispro (HUMALOG) injection vial 0-12 Units  0-12 Units SubCUTAneous TID WC Bety Bass MD   6 Units at 09/20/21 1153    insulin lispro (HUMALOG) injection vial 0-6 Units  0-6 Units SubCUTAneous Nightly Bety Bass MD   2 Units at 09/20/21 2152    pantoprazole (PROTONIX) tablet 40 mg  40 mg Oral QAM AC Bety Bass MD   40 mg at 09/21/21 5187    perflutren lipid microspheres (DEFINITY) injection 2.2 mg  2 mL IntraVENous ONCE PRN Bety Bass MD        polyethylene glycol (GLYCOLAX) packet 17 g  17 g Oral Daily PRN Bety Bass MD        ranolazine [facility]) extended release tablet 1,000 mg  1,000 mg Oral BID Bety Bass MD   1,000 mg at 09/20/21 2137    sodium citrate 4 % injection 1.9 mL  1.9 mL IntraCATHeter PRN Bety Bass MD        sodium citrate 4 % injection 1.9 mL  1.9 mL IntraCATHeter PRN Bety Bass MD        [facility]) capsule 0.4 mg  0.4 mg Oral Daily Bety Bass MD   0.4 mg at 09/20/21 7650    traZODone (DESYREL) tablet 75 mg  75 mg Oral Nightly Bety Bass MD   75 mg at 09/20/21 2136    acetaminophen (TYLENOL) tablet 650 mg  650 mg Oral Q4H PRN Gui Cain MD   650 mg at 09/21/21 0624    polyethylene glycol (GLYCOLAX) packet 17 g  17 g Oral Daily Gui Cain MD   17 g at 09/20/21 0903    senna (SENOKOT) tablet 17.2 mg  2 tablet Oral Daily PRN Isael Alexander MD   17.2 mg at 09/20/21 1039    bisacodyl (DULCOLAX) suppository 10 mg  10 mg Rectal Daily PRN Gui Cain MD         No intake or output data in the 24 hours ending 09/21/21 0637  Recent Results (from the past 24 hour(s))   POC Glucose Fingerstick    Collection Time: 09/20/21  6:57 AM   Result Value Ref Range    POC Glucose 180 (H) 65 - 105 mg/dL   POC Glucose Fingerstick    Collection Time: 09/20/21 11:30 AM Result Value Ref Range    POC Glucose 295 (H) 65 - 105 mg/dL   POC Glucose Fingerstick    Collection Time: 09/20/21  6:21 PM   Result Value Ref Range    POC Glucose 115 (H) 65 - 105 mg/dL   POC Glucose Fingerstick    Collection Time: 09/20/21  8:08 PM   Result Value Ref Range    POC Glucose 203 (H) 65 - 105 mg/dL     -----------------------------------------------------------------  RAD:  EKG:  Micro:     Assessment & Plan:    Patient Active Problem List:     Coronary artery disease     Chronic diastolic heart failure (HCC)     Diabetic polyneuropathy associated with type 2 diabetes mellitus ([facility] [address]     History of coronary artery bypass graft     Iron deficiency anemia     Spinal stenosis of lumbar region with neurogenic claudication     Mixed hyperlipidemia     Stage 4 chronic kidney disease ([facility])     Type 2 diabetes mellitus with kidney complication, with long-term current use of insulin ([facility])     Syncope and collapse     Obesity, Class II, BMI 35-39.9     Thyroid nodule greater than or equal to 1 cm in diameter incidentally noted on imaging study     Essential hypertension     Anemia in stage 4 chronic kidney disease ([facility])     Chronic ischemic heart disease     Acute cerebrovascular accident (CVA) ([facility])     Stroke-like symptoms     Morbid obesity with BMI of 40.0-44.9, adult ([facility] [address]     Acute encephalopathy     Encephalopathy     Debility           Plan:  -Debility s/p hypoxic encephalopathy - progressing in therapies, memories returning slowly. -AURELIA requiring dialysis - M,W,F, Nephrology managing.  -Acute hypoxic respiratory failure - improved, patient on room air, Pulmonology following and she needs outpatient ERICK work up.  -Essential HTN - stable. -DM2 - BSs in the 200's & 300's, stable on carb control renal diet & SS coverage.  -Continue current treatments.  -Complete orders per chart.     See orders   Disposition:    Electronically signed by Mandie Galan MD on 9/21/2021 at 6:37 AM

## 2021-09-21 NOTE — PROGRESS NOTES
Speech Language Pathology  Speech Language Pathology  [facility] Acute Rehab Unit at [facility]  Cognitive/Speech/Language  Treatment Note    Date: 9/21/2021  Patients Name: Ian Pacheco  MRN: [id number]  Diagnosis: Cognitive impairment   Patient Active Problem List   Diagnosis Code    Coronary artery disease I25.10    Chronic diastolic heart failure (HCC) I50.32    Diabetic polyneuropathy associated with type 2 diabetes mellitus (HCC) E11.42    History of coronary artery bypass graft Z95.1    Iron deficiency anemia D50.9    Spinal stenosis of lumbar region with neurogenic claudication M48.062    Mixed hyperlipidemia E78.2    Stage 4 chronic kidney disease (HCC) N18.4    Type 2 diabetes mellitus with kidney complication, with long-term current use of insulin ([facility]) E11.29, Z79.4    Syncope and collapse R55    Obesity, Class II, BMI 35-39.9 E66.9    Thyroid nodule greater than or equal to 1 cm in diameter incidentally noted on imaging study E04.1    Essential hypertension I10    Anemia in stage 4 chronic kidney disease (HCC) N18.4, D63.1    Chronic ischemic heart disease I25.9    Acute cerebrovascular accident (CVA) ([facility] [address] I63.9    Stroke-like symptoms R29.90    Morbid obesity with BMI of 40.0-44.9, adult ([facility]) E66.01, Z68.41    Acute encephalopathy G93.40    Encephalopathy G93.40    Debility R53.81       Pain: 0/10    Cognitive Treatment    Treatment time:  5939-6095    Subjective: [x] Alert [x] Cooperative     [] Confused     [] Agitated    [] Lethargic    Objective/Assessment:  Attention: functional throughout    Recall/Problem solving: Pt. Continues to use notebook to record information when learns of memory or concept she had forgotten. 4 word attribute inclusion- 60%, 100% c cues. Simple written directions- 100%. Other: Word finding: Name 8-10 items in category- 100% x1, 75%, 100% c cues x1. Pt.  Often associating new concepts to memories ( ex, when naming states, used football games to assist with recall and using things she recalls within the last week from watching TV.)     Pt. Minerva circumlocuting when attempting to find word/explain process. Pt. Demonstrated  long response latency across all tasks. Plan:  [x] Continue ST services    [] Discharge from ST:      Discharge recommendations: [] Inpatient Rehab   [] [place]   [] Outpatient Therapy  [] Follow up at trauma clinic   [] Other:       Treatment completed by: Franco Melvin A.CCC/SLP

## 2021-09-21 NOTE — PROGRESS NOTES
Physical Medicine & Rehabilitation  Progress Note      Subjective:      Roberto Salinas is a 59 y.o. female with encephalopathy, recrudescence of right-sided stroke symptoms. She reports doing fine today. She is excited because she just learned that her niece, who is pregnant, will be having a baby boy. She notes pain in the bilateral feet during dialysis yesterday, which is improved this morning. She continues to use a notebook to keep track of information, as she still has difficulty with her memory. She denies any other acute concerns. Discussed discharge on Saturday. ROS:  Denies fevers, chills, sweats. No chest pain, palpitations, lightheadedness. Denies coughing, wheezing or shortness of breath. Denies abdominal pain, nausea, diarrhea or constipation. No new areas of joint pain. Denies new areas of numbness or weakness. Denies new anxiety or depression issues. No new skin problems. Rehabilitation:   Progressing in therapies. PT:  Restrictions/Precautions: Fall Risk  Implants present? :  (R upper chest port.)  Other position/activity restrictions: up w/ assist, Hemodialysis M / W / F  Required Braces or Orthoses  Right Lower Extremity Brace:  (Lymphedema wraps)  Left Lower Extremity Brace:  (Lymphedema wraps)   Transfers  Sit to Stand: Stand by assistance  Stand to sit: Stand by assistance  Bed to Chair: Stand by assistance  Stand Pivot Transfers: Stand by assistance  Comment: Transfers completed with B UE support on rollator.   Ambulation 1  Surface: level tile  Device: Rollator  Assistance: Stand by assistance  Quality of Gait: increased fatigue and dizzyness reported with further distances, pt requires rest break before further activity  Gait Deviations: Slow Annie, Increased ELISABET, Decreased step length, Decreased step height  Distance: short distances in gym, 120ft, 105ft  Comments: Pt reported that her RLE felt very heavy    Transfers  Sit to Stand: Stand by assistance  Stand to sit: Stand by assistance  Bed to Chair: Stand by assistance  Stand Pivot Transfers: Stand by assistance  Comment: Transfers completed with B UE support on rollator. Ambulation  Ambulation?: Yes  More Ambulation?: No  Ambulation 1  Surface: level tile  Device: Rollator  Assistance: Stand by assistance  Quality of Gait: increased fatigue and dizzyness reported with further distances, pt requires rest break before further activity  Gait Deviations: Slow Annie, Increased ELISABET, Decreased step length, Decreased step height  Distance: short distances in gym, 120ft, 105ft  Comments: Pt reported that her RLE felt very heavy    Surface: level tile  Ambulation 1  Surface: level tile  Device: Rollator  Assistance: Stand by assistance  Quality of Gait: increased fatigue and dizzyness reported with further distances, pt requires rest break before further activity  Gait Deviations: Slow Annie, Increased ELISABET, Decreased step length, Decreased step height  Distance: short distances in gym, 120ft, 105ft  Comments: Pt reported that her RLE felt very heavy    OT:  ADL  Equipment Provided: Reacher  Feeding: Setup  Grooming: Setup (seated on 4WW sinkside. )  UE Bathing: Setup (seated on 4WW sinkside )  LE Bathing: None  UE Dressing: Supervision, Setup (SBA for set up )  LE Dressing: Supervision, Setup (SBA for set up; declined changing footwear today, declined c)  Toileting: None  Additional Comments: no noted memory/sequencing deficits during ADL session. Instrumental ADL's  Instrumental ADLs: Yes     Balance  Sitting Balance: Supervision (some impulse noted in shower. )  Standing Balance: Stand by assistance   Standing Balance  Time: AM: 2 min   Activity: AM: retrieving clothing items from closet  Comment: sudden onset of dizziness, uncontrolled sit. Functional Mobility  Functional - Mobility Device: 4-Wheeled Walker  Activity: Retrieve items  Assist Level: Contact guard assistance  Functional Mobility Comments: SBA-CGA.  good memory or concept she had forgotten. 3 word memory and mental manipulation- 95%, 100% c cues. Paragraph recall- 100%. 4 step written ADL sequencing- very min A.     Other: Word finding: Name 8-10 items in category- 100% x1, 70%, 100% c cues x1. Pt. Minerva circumlocuting when attempting to find word/explain process. Pt. Demonstrated  long response latency across all tasks.       Worksheets left for pt. At pt. Request for I practice.        Current Medications:   Current Facility-Administered Medications: 0.9 % sodium chloride infusion, 25 mL, IntraVENous, PRN  ondansetron (ZOFRAN-ODT) disintegrating tablet 4 mg, 4 mg, Oral, Q8H PRN **OR** [DISCONTINUED] ondansetron (ZOFRAN) injection 4 mg, 4 mg, IntraVENous, Q6H PRN  sodium chloride flush 0.9 % injection 5-40 mL, 5-40 mL, IntraVENous, PRN  potassium chloride (KLOR-CON M) extended release tablet 40 mEq, 40 mEq, Oral, PRN **OR** potassium bicarb-citric acid (EFFER-K) effervescent tablet 40 mEq, 40 mEq, Oral, PRN **OR** potassium chloride 10 mEq/100 mL IVPB (Peripheral Line), 10 mEq, IntraVENous, PRN  apixaban (ELIQUIS) tablet 5 mg, 5 mg, Oral, BID  aspirin chewable tablet 81 mg, 81 mg, Oral, Daily  atorvastatin (LIPITOR) tablet 80 mg, 80 mg, Oral, Daily  busPIRone (BUSPAR) tablet 7.5 mg, 7.5 mg, Oral, TID  carvedilol (COREG) tablet 6.25 mg, 6.25 mg, Oral, BID  dextrose 5 % solution, 100 mL/hr, IntraVENous, PRN  dextrose 50 % IV solution, 12.5 g, IntraVENous, PRN  febuxostat (ULORIC) tablet 40 mg, 40 mg, Oral, Daily  furosemide (LASIX) tablet 80 mg, 80 mg, Oral, BID  gabapentin (NEURONTIN) capsule 300 mg, 300 mg, Oral, BID  glucagon (rDNA) injection 1 mg, 1 mg, IntraMUSCular, PRN  glucose (GLUTOSE) 40 % oral gel 15 g, 15 g, Oral, PRN  insulin glargine (LANTUS) injection vial 53 Units, 53 Units, SubCUTAneous, BID  insulin lispro (HUMALOG) injection vial 0-12 Units, 0-12 Units, SubCUTAneous, TID WC  insulin lispro (HUMALOG) injection vial 0-6 Units, 0-6 Units, SubCUTAneous, Nightly  pantoprazole (PROTONIX) tablet 40 mg, 40 mg, Oral, QAM AC  perflutren lipid microspheres (DEFINITY) injection 2.2 mg, 2 mL, IntraVENous, ONCE PRN  polyethylene glycol (GLYCOLAX) packet 17 g, 17 g, Oral, Daily PRN  ranolazine (RANEXA) extended release tablet 1,000 mg, 1,000 mg, Oral, BID  sodium citrate 4 % injection 1.9 mL, 1.9 mL, IntraCATHeter, PRN  sodium citrate 4 % injection 1.9 mL, 1.9 mL, IntraCATHeter, PRN  tamsulosin (FLOMAX) capsule 0.4 mg, 0.4 mg, Oral, Daily  traZODone (DESYREL) tablet 75 mg, 75 mg, Oral, Nightly  acetaminophen (TYLENOL) tablet 650 mg, 650 mg, Oral, Q4H PRN  polyethylene glycol (GLYCOLAX) packet 17 g, 17 g, Oral, Daily  senna (SENOKOT) tablet 17.2 mg, 2 tablet, Oral, Daily PRN  bisacodyl (DULCOLAX) suppository 10 mg, 10 mg, Rectal, Daily PRN      Objective:  BP (!) 131/43   Pulse 59   Temp 97.3 °F (36.3 °C) (Oral)   Resp 18   Ht 5' 5\" (1.651 m)   Wt 239 lb 3.2 oz (108.5 kg)   SpO2 91%   BMI 39.80 kg/m²       GEN: Well developed, well nourished, no acute distress  HEENT: NCAT. EOM grossly intact. Hearing grossly intact. Mucous membranes pink and moist.  RESP: Normal breath sounds with no wheezing, rales, or rhonchi. Respirations WNL and unlabored. CV: Regular rate and rhythm. No murmurs, rubs, or gallops. ABD: Soft, non-distended, BS+ and equal.  NEURO: Alert. Speech fluent. Impaired memory. Sensation to light touch decreased in right upper and lower limbs. MSK:  Muscle bulk is normal bilaterally. Strength 4/5 in right upper and lower limbs. Strength 4+/5 in left upper and lower limbs. LIMBS: Edema present in bilateral lower limbs. SKIN: Warm and dry with good turgor. PSYCH: Mood WNL. Affect WNL. Appropriately interactive. Diagnostics:     CBC:   No results for input(s): WBC, RBC, HGB, HCT, MCV, RDW, PLT in the last 72 hours. BMP:   No results for input(s): NA, K, CL, CO2, PHOS, BUN, CREATININE, GLUCOSE in the last 72 hours.     Invalid input(s): CA  BNP: No results for input(s): BNP in the last 72 hours. PT/INR: No results for input(s): PROTIME, INR in the last 72 hours. APTT: No results for input(s): APTT in the last 72 hours. CARDIAC ENZYMES: No results for input(s): CKMB, CKMBINDEX, TROPONINT in the last 72 hours. Invalid input(s): CKTOTAL;3  FASTING LIPID PANEL:  Lab Results   Component Value Date    CHOL 105 04/09/2015    HDL 43 04/09/2015    TRIG 168 04/09/2015     LIVER PROFILE: No results for input(s): AST, ALT, ALB, BILIDIR, BILITOT, ALKPHOS in the last 72 hours. Impression/Plan:   Impaired ADLs, gait, and mobility due to:    1. Encephalopathy:  Thought to be secondary to hypoperfusion. SLP for cognition/memory. 2. Recrudescence of right-sided stroke symptoms: In the setting of recent CVA. PT/OT  for gait, mobility, strengthening, endurance, ADLs, and self care. On aspirin, atorvastatin. 3. Anemia:  Hemoglobin 11.3 on 9/18, stable. Monitoring. On iron supplementation - discontinued 9/21, as patient is refusing due to constipation with iron supplement. 4. Thrombocytopenia:  Platelets 754 on 9/48, stable. Monitoring. 5. AURELIA on CKD: On hemodialysis MWF. Nephrology following. 6. CAD:  On aspirin, atorvastatin, ranexa  7. Atrial fibrillation:  On eliquis, carvedilol  8. HTN:  On carvedilol, furosemide  9. Type 2 diabetes with neuropathy:  On lantus, humalog sliding scale. On gabpentin  10. Anxiety: On buspirone TID  11. Insomnia:  On trazodone nightly  12. Gout:  On febuxostat  13. Obesity  14. Bowel Management: Miralax daily, senokot prn, dulcolax prn. 15. DVT prophylaxis:  On eliquis  16.  Internal Medicine for medical management      Electronically signed by Debi Lunsford MD on 9/21/2021 at 9:49 PM

## 2021-09-21 NOTE — PLAN OF CARE
Problem: Falls - Risk of:  Goal: Will remain free from falls  Description: Will remain free from falls  9/21/2021 1525 by Gilford Cower, RN  Outcome: Ongoing  9/21/2021 0446 by Angeles Chamorro RN  Outcome: Ongoing  Goal: Absence of physical injury  Description: Absence of physical injury  9/21/2021 1525 by Gilford Cower, RN  Outcome: Ongoing  9/21/2021 0446 by Angeles Chamorro RN  Outcome: Ongoing     Problem: Skin Integrity:  Goal: Will show no infection signs and symptoms  Description: Will show no infection signs and symptoms  9/21/2021 1525 by Gilford Cower, RN  Outcome: Ongoing  9/21/2021 0446 by Angeles Chamorro RN  Outcome: Ongoing  Goal: Absence of new skin breakdown  Description: Absence of new skin breakdown  9/21/2021 1525 by Gilford Cower, RN  Outcome: Ongoing  9/21/2021 0446 by Angeles Chamorro RN  Outcome: Ongoing     Problem: SAFETY  Goal: LTG - Patient will demonstrate safety requirements appropriate to situation/environment  Outcome: Ongoing  Goal: LTG - patient will utilize safety techniques  Outcome: Ongoing  Goal: STG - patient locks brakes on wheelchair  Outcome: Ongoing  Goal: STG - Patient uses call light consistently to request assistance with transfers  Outcome: Ongoing     Problem: Musculor/Skeletal Functional Status  Goal: Highest potential functional level  9/21/2021 1525 by Gilford Cower, RN  Outcome: Ongoing  9/21/2021 0446 by Angeles Chamorro RN  Outcome: Ongoing  Goal: Absence of falls  9/21/2021 1525 by Gilford Cower, RN  Outcome: Ongoing  9/21/2021 0446 by Angeles Chamorro RN  Outcome: Ongoing     Problem: Pain:  Goal: Pain level will decrease  Description: Pain level will decrease  9/21/2021 1525 by Gilford Cower, RN  Outcome: Ongoing  9/21/2021 0446 by Angeles Chamorro RN  Outcome: Ongoing  Goal: Control of acute pain  Description: Control of acute pain  9/21/2021 1525 by Gilford Cower, RN  Outcome: Ongoing  9/21/2021 0446 by Angeles Chamorro RN  Outcome: Ongoing  Goal: Control of chronic pain  Description: Control of chronic pain  9/21/2021 1525 by Leah Siegel RN  Outcome: Ongoing  9/21/2021 0446 by Luis Ball RN  Outcome: Ongoing     Problem: Nutrition  Goal: Optimal nutrition therapy  Outcome: Ongoing     Problem: Musculor/Skeletal Functional Status  Goal: Highest potential functional level  9/21/2021 1525 by Leah Siegel RN  Outcome: Ongoing  9/21/2021 0446 by Luis Ball RN  Outcome: Ongoing  Goal: Absence of falls  9/21/2021 1525 by Leah Siegel RN  Outcome: Ongoing  9/21/2021 0446 by Luis Ball RN  Outcome: Ongoing

## 2021-09-21 NOTE — DISCHARGE INSTR - COC
Continuity of Care Form    Patient Name: Raffi Pfeiffer   :  1960  MRN:  [id number]    Admit date:  2021  Discharge date:  2021    Code Status Order: Full Code   Advance Directives:      Admitting Physician:  Arianna Olvera MD  PCP: Walter Salazar, APRN - CNP    Discharging Nurse: [facility] Unit/Room#: 3495/9045-02  Discharging Unit Phone Number: [phone]    Emergency Contact:   Extended Emergency Contact Information  Primary Emergency Contact: [address], [address] Phone: [phone]  Relation: Brother/Sister  Secondary Emergency Contact: Rickie Perez, [address] Phone: [phone]  Relation: Brother/Sister    Past Surgical History:  Past Surgical History:   Procedure Laterality Date    ANKLE FRACTURE SURGERY      BREAST SURGERY      CARPAL TUNNEL RELEASE      CHOLECYSTECTOMY, OPEN N/A     CORONARY ARTERY BYPASS GRAFT      x3    HAND SURGERY      IR TUNNELED CATHETER PLACEMENT GREATER THAN 5 YEARS  2021    IR TUNNELED CATHETER PLACEMENT GREATER THAN 5 YEARS 2021 [facility] SPECIAL PROCEDURES    KNEE ARTHROSCOPY      right    TONSILLECTOMY         Immunization History:   Immunization History   Administered Date(s) Administered    COVID-19, Pfizer, PF, 30mcg/0.3mL 2021, 2021    Influenza Virus Vaccine 10/18/2018, 09/10/2019, 2020    Influenza, Quadv, IM, (6 mo and older Fluzone, Flulaval, Fluarix and 3 yrs and older Afluria) 10/16/2017    Influenza, Quadv, IM, PF (6 mo and older Fluzone, Flulaval, Fluarix, and 3 yrs and older Afluria) 10/18/2018, 09/10/2019, 2019, 2020    Pneumococcal Conjugate 13-valent ([facility] [place]) 2019    Pneumococcal Polysaccharide ([id number]) 10/30/2014    Tdap (Boostrix, Adacel) 2017       Active Problems:  Patient Active Problem List   Diagnosis Code    Coronary artery disease I25.10    Chronic diastolic heart failure ([facility] I50.32    Diabetic polyneuropathy associated with type 2 diabetes mellitus (HCC) E11.42    History of coronary artery bypass graft Z95.1    Iron deficiency anemia D50.9    Spinal stenosis of lumbar region with neurogenic claudication M48.062    Mixed hyperlipidemia E78.2    Stage 4 chronic kidney disease (HCC) N18.4    Type 2 diabetes mellitus with kidney complication, with long-term current use of insulin (HCC) E11.29, Z79.4    Syncope and collapse R55    Obesity, Class II, BMI 35-39.9 E66.9    Thyroid nodule greater than or equal to 1 cm in diameter incidentally noted on imaging study E04.1    Essential hypertension I10    Anemia in stage 4 chronic kidney disease (HCC) N18.4, D63.1    Chronic ischemic heart disease I25.9    Acute cerebrovascular accident (CVA) ([facility] [address] I63.9    Stroke-like symptoms R29.90    Morbid obesity with BMI of 40.0-44.9, adult ([facility]) E66.01, Z68.41    Acute encephalopathy G93.40    Encephalopathy G93.40    Debility R53.81       Isolation/Infection:   Isolation            Contact  Contact          Patient Infection Status       Infection Onset Added Last Indicated Last Indicated By Review Planned Expiration Resolved Resolved By    [facility] 08/03/20 08/13/21 08/13/21 Jamila Dixon RN        Added from external infection.             Nurse Assessment:  Last Vital Signs: /65   Pulse 72   Temp 98 °F (36.7 °C) (Oral)   Resp 16   Ht 5' 5\" (1.651 m)   Wt 239 lb 3.2 oz (108.5 kg)   SpO2 98%   BMI 39.80 kg/m²     Last documented pain score (0-10 scale): Pain Level: 0  Last Weight:   Wt Readings from Last 1 Encounters:   09/20/21 239 lb 3.2 oz (108.5 kg)     Mental Status:  oriented, alert, coherent, logical, thought processes intact and able to concentrate and follow conversation    IV Access:  - None    Nursing Mobility/ADLs:  Walking   Assisted  Transfer  Assisted  Bathing  Assisted  Dressing  Assisted  Toileting  Assisted  Feeding  [address]  Assisted  Med Delivery   whole    Wound Care Documentation and Therapy:  Wound 08/11/21 Buttocks Right;Posterior (Active)   Wound Etiology Skin Tear 09/15/21 1707   Dressing Status Clean;Dry; Intact 09/15/21 1707   Wound Cleansed Soap and water 09/15/21 1707   Offloading for Diabetic Foot Ulcers No 09/15/21 1707   Number of days: 40        Elimination:  Continence:   · Bowel: Yes  · Bladder: Yes  Urinary Catheter: None   Colostomy/Ileostomy/Ileal Conduit: No       Date of Last BM: 09/24/2021  No intake or output data in the 24 hours ending 09/21/21 1146  No intake/output data recorded. Safety Concerns: At Risk for Falls    Impairments/Disabilities:      memory    Nutrition Therapy:  Current Nutrition Therapy:   - Oral Diet:  Carb Control 4 carbs/meal (1800kcals/day)    Routes of Feeding: Oral  Liquids: Thin Liquids  Daily Fluid Restriction: no  Last Modified Barium Swallow with Video (Video Swallowing Test): not done    Treatments at the Time of Hospital Discharge:   Respiratory Treatments:   Oxygen Therapy:  is not on home oxygen therapy.   Ventilator:    - No ventilator support    Rehab Therapies: Physical Therapy, Occupational Therapy and Speech/Language Therapy  Weight Bearing Status/Restrictions: No weight bearing restirctions  Other Medical Equipment (for information only, NOT a DME order):  wheelchair, cane, walker, bath bench and lymphedema cuffs  Other Treatments: ***    Patient's personal belongings (please select all that are sent with patient):  Glasses, cell phone,     RN SIGNATURE:  Electronically signed by Jose Becker RN on 9/25/21 at 5:49 PM EDT    CASE MANAGEMENT/SOCIAL WORK SECTION    Inpatient Status Date: 9/17/21  Readmission Risk Assessment Score:  Readmission Risk              Risk of Unplanned Readmission:  27           Discharging to Facility/ Agency    Name: [address] [place] [address] [address], [address] [address]   Phone: [phone]  · Fax: [phone]      Dialysis Facility (if applicable)   · Name: [facility]  · Address:[place]   · [address] [facility], [facility]  · Phone; [phone]  · Fax; [phone]  · Dialysis Schedule: MWF at 11:15 AM      / signature: Electronically signed by AZ Johnson on 9/21/21 at 11:52 AM EDT    PHYSICIAN SECTION    Prognosis: Fair    Condition at Discharge: Stable    Rehab Potential (if transferring to Rehab): Good    Recommended Labs or Other Treatments After Discharge: Physical and occupational therapy for ongoing functional deficits related to CVA. Speech therapy for ongoing cognitive deficits related to encephalopathy. Nursing for medication management. Physician Certification: I certify the above information and transfer of Vanessa Huang  is necessary for the continuing treatment of the diagnosis listed and that she requires [address] for less than 30 days.      Update Admission H&P: No change in H&P    PHYSICIAN SIGNATURE:  Electronically signed by Faviola Calvo MD on 9/21/21 at 10:54 PM EDT

## 2021-09-21 NOTE — PROGRESS NOTES
NEPHROLOGY PROGRESS NOTE    Patient :  Antoinette Tsai; 59 y.o. MRN# [id number]  Location:  9314/4666-16  Attending:  Rambo Tracey Date:  9/17/2021   Hospital Day: 4      Reason for Consult: Acute kidney injury dialysis dependent      Chief Complaint: Confusion  History Obtained From: Patient    History of Present Illness: This is a 59 y.o. female with past medical history of longstanding type 2 diabetes insulin-dependent diabetes mellitus, essential hypertension, coronary artery disease status post CABG in 2004, coronary artery stent in 2019, CHF with EF of 55%, history of mild to moderate LVH diastolic dysfunction, recent history of acute/subacute stroke in left frontal lobe with right-sided weakness, patient developed acute kidney injury due to contrast-induced nephropathy requiring dialysis since August 2021  Patient currently receives dialysis Monday Wednesday Friday under Dr. Sheng Driscoll at [facility]. Patient presented from dialysis with altered mental status. She apparently had transient loss of consciousness but unclear the duration and was confused. She has numbness over the right lower extremity and cannot feel the right side of her body which is new. Patient had MRI of the brain which showed no acute changes. MRA of the neck showed no hemodynamically significant stenosis identified. Patient transferred to acute rehab on 9/17/21.   Nephrologist consulted for continuation of dialysis    Subjective/interval history:    Patient seen and examined, no acute events overnight  Patient had dialysis yesterday tolerated well  Blood pressure stable        Past Medical History:        Diagnosis Date    Backache, unspecified     CHF (congestive heart failure) (HCC)     Chronic kidney disease     Coronary atherosclerosis of artery bypass graft     Cramp of limb     Gallstones     Hypertension     Insomnia     Pneumonia     Type II or unspecified type diabetes mellitus with renal citrate 4 % injection 1.9 mL, PRN  tamsulosin (FLOMAX) capsule 0.4 mg, Daily  traZODone (DESYREL) tablet 75 mg, Nightly  acetaminophen (TYLENOL) tablet 650 mg, Q4H PRN  polyethylene glycol (GLYCOLAX) packet 17 g, Daily  senna (SENOKOT) tablet 17.2 mg, Daily PRN  bisacodyl (DULCOLAX) suppository 10 mg, Daily PRN        Allergies:  Adhesive tape, Ace inhibitors, Iv dye [iodides], and Metformin and related    Social History:   Social History     Socioeconomic History    Marital status: Single     Spouse name: Not on file    Number of children: Not on file    Years of education: Not on file    Highest education level: Not on file   Occupational History    Not on file   Tobacco Use    Smoking status: Never Smoker    Smokeless tobacco: Never Used   Substance and Sexual Activity    Alcohol use: No    Drug use: No    Sexual activity: Not Currently   Other Topics Concern    Not on file   Social History Narrative    Not on file     Social Determinants of Health     Financial Resource Strain: Low Risk     Difficulty of Paying Living Expenses: Not hard at all   Food Insecurity:     Worried About [address] in the Last Year:     Ran Out of Food in the Last Year:    Transportation Needs:     Lack of Transportation (Medical):      Lack of Transportation (Non-Medical):    Physical Activity:     Days of Exercise per Week:     Minutes of Exercise per Session:    Stress:     Feeling of Stress :    Social Connections:     Frequency of Communication with Friends and Family:     Frequency of Social Gatherings with Friends and Family:     Attends [employer] Services:     Active Member of Clubs or Organizations:     Attends Club or Organization Meetings:     Marital Status:    Intimate Partner Violence:     Fear of Current or Ex-Partner:     Emotionally Abused:     Physically Abused:     Sexually Abused:            Objective:  CURRENT TEMPERATURE:  Temp: 98 °F (36.7 °C)  MAXIMUM TEMPERATURE OVER 24HRS: Temp (24hrs), Av.7 °F (36.5 °C), Min:97.5 °F (36.4 °C), Max:98 °F (36.7 °C)    CURRENT RESPIRATORY RATE:  Resp: 16  CURRENT PULSE:  Pulse: 72  CURRENT BLOOD PRESSURE:  BP: 132/65  24HR BLOOD PRESSURE RANGE:  Systolic (30WTB), DNB:843 , Min:99 , MAKENNA:792   ; Diastolic (35AHK), TPH:10, Min:43, Max:65    24HR INTAKE/OUTPUT:    No intake or output data in the 24 hours ending 21 1125  Patient Vitals for the past 96 hrs (Last 3 readings):   Weight   21 1800 239 lb 3.2 oz (108.5 kg)   21 1435 244 lb 7.8 oz (110.9 kg)   21 1001 244 lb 9.6 oz (110.9 kg)       Physical Exam:  GENERAL APPEARANCE: Alert and cooperative, and appears to be in no acute distress. HEAD: normocephalic  THROAT:  Oral cavity and pharynx normal. Moist  CARDIAC: Normal S1 and S2. No S3, S4 or murmurs. Rhythm is regular. LUNGS: Clear to auscultation and percussion without rales, rhonchi, wheezing or diminished breath sounds. NECK: Neck supple, non-tender without lymphadenopathy, masses or thyromegaly. JVD-neg  BACK: Examination of the spine reveals normal gait and posture, no spinal deformity, symmetry of spinal muscles, without tenderness, decreased range of motion or muscular spasm  MUSKULOSKELETAL: Adequately aligned spine. No joint erythema or tenderness. EXTREMITIES: No edema. Peripheral pulses intact. NEURO: Right-sided weakness and numbness    Labs:      Radiology:  Reviewed as available. Assessment:  1. AURELIA secondary to ATN, dialysis dependent on hemodialysis   2. Essential hypertension  3. History of CVA  4. Type 2 diabetes insulin-dependent diabetes mellitus       Plan:  1. Hemodialysis , HD tomorrow  2. [facility]       Thank you for the consultation.       Electronically signed by Faith Brown MD on 2021 at 11:25 AM

## 2021-09-21 NOTE — PROGRESS NOTES
[facility]    ACUTE REHABILITATION OCCUPATIONAL THERAPY  DAILY NOTE    Date: 21  Patient Name: Madelyn Munoz      Room: 8315/5033-63    MRN: [id number]   : 1960  (59 y.o.)  Gender: female   Referring Practitioner: Mandie Galan MD  Diagnosis: Acute encephalopathy, thought to be secondary to hypoperfusion Recrudescence of right-sided stroke symptoms, admit post syncope event during dialysis with AURELIA on CKI. Additional Pertinent Hx: d/c from  acute rehab here on 21 post treat for CVA with R weak. BLE lymphedema -pt notes per GP not to use pumps from home currently. pt to bring in comp garments and brandon. Per PM&R note: Madelyn Munoz is a 59 y.o. right-handed female with history of recent CVA, AURELIA on CKD (on hemodialysis), CAD, CHF, atrial fibrillation, HTN, type 2 diabetes admitted to [facility] on 2021. She initially presented following a syncopal episode in dialysis, after which she was reportedly confused. She has reported right lower limb weakness and heaviness. Imaging of the brain has been unremarkable. EEG showed mild encephalopathy with no epileptiform activity. Symptoms are thought to be secondary to hypoperfusion. Pulmonology has been consulted for new oxygen requirement overnight. She currently reports ongoing impaired memory and right-sided numbness/tingling and weakness. Restrictions  Restrictions/Precautions: Fall Risk  Implants present? :  (R upper chest port.)  Other position/activity restrictions: up w/ assist, Hemodialysis M / W / F  Required Braces or Orthoses  Right Lower Extremity Brace:  (Lymphedema wraps)  Left Lower Extremity Brace:  (Lymphedema wraps)  Required Braces or Orthoses?: Yes    Subjective  Subjective: \"I made a new memory today\" Pt reports in regards to hearing news of her becoming a great aunt to a baby boy. Comments: Pt pleasant and motivated.    Patient Currently in Pain: Denies  Restrictions/Precautions: Fall Risk  Overall Orientation Status: Within Functional Limits          Objective  Cognition  Overall Cognitive Status: Impaired  Attention Span: Attends with cues to redirect  Memory: Decreased long term memory;Decreased short term memory  Following Commands: Follows one step commands consistently  Safety Judgement: Good awareness of safety precautions  Awareness of Errors: Decreased awareness of errors  Insights: Fully aware of deficits  Sequencing and Organization: Able to problem solve independently  Perception  Overall Perceptual Status: Impaired  Initiation: Cues to initiate tasks     Transfers  Sit to stand: Contact guard assistance  Stand to sit: Contact guard assistance  Standing Balance  Time: AM: 2 min   Activity: AM: retrieving clothing items from closet  Comment: sudden onset of dizziness, uncontrolled sit. Functional Mobility  Functional - Mobility Device: 4-Wheeled Walker  Activity: Retrieve items  Assist Level: Contact guard assistance  Functional Mobility Comments: SBA-CGA. good safety with device     Type of ROM/Therapeutic Exercise  Type of ROM/Therapeutic Exercise: AROM  Exercises  Grasp/Release: 2nd setting hand gripper x20 B hands. Other: resistive clothespins for pinch  strengthening for improved management during IADL task of hanging/managing pants due to clipped hangers. Pt with noted difficulty during task in prep for ADL earlier. Pt motivated to complete activity, tolerated well with increased time. Additional Activities: Other  Additional Activities: AM: Pt managed clothing onto hangers, including clip hangers for pants; increased time to manage with minimal difficulty/assist required. PM: ADL boards for lacing to address FMC/B hand integration and sequencing skills; all well tolerated. Pt also engaged in button board with min-mod difficulty and increased time to complete. ADL  Equipment Provided: Reacher  Feeding: Setup  Grooming: Setup (seated on 4WW sinkside. )  UE Bathing: Setup (seated on 4WW sinkside )  LE Bathing: None  UE Dressing: Supervision;Setup (SBA for set up )  LE Dressing: Supervision;Setup (SBA for set up; declined changing footwear today, declined compression stockings/sleeves)  Toileting: None        Assessment  Performance deficits / Impairments: Decreased ADL status; Decreased functional mobility ; Decreased ROM; Decreased strength;Decreased safe awareness;Decreased cognition;Decreased endurance;Decreased balance;Decreased high-level IADLs;Decreased fine motor control;Decreased coordination  Prognosis: Good  Discharge Recommendations: Patient would benefit from continued therapy after discharge  Activity Tolerance: Patient Tolerated treatment well  Safety Devices in place: Yes  Type of devices: All fall risk precautions in place;Call light within reach;Gait belt;Patient at risk for falls; Left in chair;Nurse notified           Plan  Plan  Times per week: Plan Of Care:  Due to impaired functional endurance and/or medical issues, the patient is to be seen for a combined total of at least a  900 minutes over 7 days of Physical, Occupational and/or Speech Therapy. Times per day: Twice a day  Current Treatment Recommendations: Self-Care / ADL, Strengthening, ROM, Balance Training, Functional Mobility Training, Endurance Training, Neuromuscular Re-education, Cognitive Reorientation, Safety Education & Training, Patient/Caregiver Education & Training, Equipment Evaluation, Education, & procurement, Home Management Training, Cognitive/Perceptual Training  Patient Goals   Patient goals : return home safe and indep with self care  Short term goals  Time Frame for Short term goals: 1 week  Short term goal 1: pt to use BUE to cut food successfully and feed self with RUE mod indep. Short term goal 2: SBA ambulate to obtain set up for adls.   Short term goal 3: SBA LE bathe and dress, able to don compression stockings with own device with min assist  Short term goal 4: ilana 9-11 min stand, ambulate with SBA and good safety for adls  Short term goal 5: ilana 10 reps x 3 R shld AROM achieving 95 degrees, ilana 10 reps x 3 R hand  strengthening. Long term goals  Time Frame for Long term goals : by discharge  Long term goal 1: mod indep ambulate to obtain set up for adls (4 [employer])  Long term goal 2: mod indep self care and toileting  Long term goal 3: tolerate 15-17 min stand, ambulate for adls mod indep with good safety. Long term goal 4: indep RUE HEP  Long term goal 5: increase R shoulder AROM to 100 and MMT to 3+ and R  to 20 pounds to improve use of RUE for adls.         09/21/21 1127 09/21/21 1605   OT Individual Minutes   Time In 1030 1338   Time Out 1119 1404   Minutes 49 26     Electronically signed by DAYANA Cortés on 9/21/21 at 4:12 PM EDT

## 2021-09-21 NOTE — PLAN OF CARE
Patient requesting I call her parents to see if they won't come in and visit her  Patient provided phone number and number was called  Spoke with Mable Gutiérrez, patient's son, number for the unit left with him for mother to call when returns home from work  RN  Outcome: Ongoing  9/21/2021 1525 by Aaron Leslie RN  Outcome: Ongoing  9/21/2021 0446 by Wendy Chambers RN  Outcome: Ongoing  Goal: Absence of falls  9/21/2021 1526 by Aaron Leslie RN  Outcome: Ongoing  9/21/2021 1525 by Aaron Leslie RN  Outcome: Ongoing  9/21/2021 0446 by Wendy Chambers RN  Outcome: Ongoing     Problem: Pain:  Goal: Pain level will decrease  Description: Pain level will decrease  9/21/2021 1526 by Aaron Leslie RN  Outcome: Ongoing  9/21/2021 1525 by Aaron Leslie RN  Outcome: Ongoing  9/21/2021 0446 by Wendy Chambers RN  Outcome: Ongoing  Goal: Control of acute pain  Description: Control of acute pain  9/21/2021 1526 by Aaron Leslie RN  Outcome: Ongoing  9/21/2021 1525 by Aaron Leslie RN  Outcome: Ongoing  9/21/2021 0446 by Wendy Chambers RN  Outcome: Ongoing  Goal: Control of chronic pain  Description: Control of chronic pain  9/21/2021 1526 by Aaron Leslie RN  Outcome: Ongoing  9/21/2021 1525 by Aaron Leslie RN  Outcome: Ongoing  9/21/2021 0446 by Wendy Chambers RN  Outcome: Ongoing     Problem: Nutrition  Goal: Optimal nutrition therapy  9/21/2021 1526 by Aaron Leslie RN  Outcome: Ongoing  9/21/2021 1525 by Aaron Leslie RN  Outcome: Ongoing     Problem: Musculor/Skeletal Functional Status  Goal: Highest potential functional level  9/21/2021 1526 by Aaron Leslie RN  Outcome: Ongoing  9/21/2021 1525 by Aaron Leslie RN  Outcome: Ongoing  9/21/2021 0446 by Wendy Chambers RN  Outcome: Ongoing  Goal: Absence of falls  9/21/2021 1526 by Aaron Leslie RN  Outcome: Ongoing  9/21/2021 1525 by Aaron Leslie RN  Outcome: Ongoing  9/21/2021 0446 by Wendy Chambers RN  Outcome: Ongoing

## 2021-09-21 NOTE — CARE COORDINATION
Attempted to contact [facility] [phone];  left    Notified Arturo Novak at [facility] of pt's discharge on 9/25/21.

## 2021-09-21 NOTE — PLAN OF CARE
Problem: Falls - Risk of:  Goal: Will remain free from falls  Description: Will remain free from falls  9/21/2021 0446 by Luis Ball RN  Outcome: Ongoing     Problem: Falls - Risk of:  Goal: Absence of physical injury  Description: Absence of physical injury  9/21/2021 0446 by Luis Ball RN  Outcome: Ongoing     Problem: Skin Integrity:  Goal: Will show no infection signs and symptoms  Description: Will show no infection signs and symptoms  9/21/2021 0446 by Luis Ball RN  Outcome: Ongoing     Problem: Skin Integrity:  Goal: Absence of new skin breakdown  Description: Absence of new skin breakdown  9/21/2021 0446 by Luis Ball RN  Outcome: Ongoing     Problem: Musculor/Skeletal Functional Status  Goal: Highest potential functional level  9/21/2021 0446 by Luis Ball RN  Outcome: Ongoing     Problem: Musculor/Skeletal Functional Status  Goal: Absence of falls  9/21/2021 0446 by Luis Ball RN  Outcome: Ongoing     Problem: Pain:  Goal: Pain level will decrease  Description: Pain level will decrease  9/21/2021 0446 by Luis Ball RN  Outcome: Ongoing     Problem: Pain:  Goal: Control of acute pain  Description: Control of acute pain  9/21/2021 0446 by Luis Ball RN  Outcome: Ongoing     Problem: Pain:  Goal: Control of chronic pain  Description: Control of chronic pain  9/21/2021 0446 by Luis Ball RN  Outcome: Ongoing     Problem: Musculor/Skeletal Functional Status  Goal: Highest potential functional level  9/21/2021 0446 by Luis Ball RN  Outcome: Ongoing     Problem: Musculor/Skeletal Functional Status  Goal: Absence of falls  9/21/2021 0446 by Luis Ball RN  Outcome: Ongoing

## 2021-09-21 NOTE — PROGRESS NOTES
Physical Therapy  Facility/Department: [facility]  Daily Treatment Note  NAME: Ajit Altamirano  : 1960  MRN: [id number]    Date of Service: 2021    Discharge Recommendations:  Patient would benefit from continued therapy after discharge, Home with assist PRN   PT Equipment Recommendations  Equipment Needed: No  Other: Pt has rollator at home    Assessment   Body structures, Functions, Activity limitations: Decreased functional mobility ; Decreased strength;Decreased endurance;Decreased safe awareness;Decreased balance;Decreased cognition  Treatment Diagnosis: impaired mobility due to weakness and balance issues  Specific instructions for Next Treatment: Monitor sao2 with activity. instruct in strengthening and balance activities, instruct in standing balance and transfers/ gait  as LE strength improves  History: admitted due to acute encephalopathy  REQUIRES PT FOLLOW UP: Yes  Activity Tolerance  Activity Tolerance: Patient limited by fatigue;Patient limited by endurance; Other  Activity Tolerance: dizzy with greater exertion, frequent rest breaks needed     Patient Diagnosis(es): There were no encounter diagnoses. has a past medical history of Backache, unspecified, CHF (congestive heart failure) ([address] Chronic kidney disease, Coronary atherosclerosis of artery bypass graft, Cramp of limb, Gallstones, Hypertension, Insomnia, Pneumonia, Type II or unspecified type diabetes mellitus with renal manifestations, not stated as uncontrolled(250.40), Type II or unspecified type diabetes mellitus without mention of complication, not stated as uncontrolled, and Unspecified vitamin D deficiency. has a past surgical history that includes Coronary artery bypass graft; Knee arthroscopy; Carpal tunnel release; Breast surgery; Tonsillectomy; Hand surgery; Ankle fracture surgery;  Cholecystectomy, open (N/A); and IR TUNNELED CVC PLACE WO SQ PORT/PUMP > 5 YEARS (8/18/2021). Restrictions  Restrictions/Precautions  Restrictions/Precautions: Fall Risk  Required Braces or Orthoses?: Yes  Implants present? :  (R upper chest port.)  Required Braces or Orthoses  Right Lower Extremity Brace:  (Lymphedema wraps)  Left Lower Extremity Brace:  (Lymphedema wraps)  Position Activity Restriction  Other position/activity restrictions: up w/ assist, Hemodialysis M / W / F     Subjective   General  Chart Reviewed: Yes  Additional Pertinent Hx: Per PM&R note: Yvette Spence is a 59 y.o. right-handed female with history of recent CVA, AURELIA on CKD (on hemodialysis), CAD, CHF, atrial fibrillation, HTN, type 2 diabetes admitted to [address] [place] [address] on 9/13/2021. She initially presented following a syncopal episode in dialysis, after which she was reportedly confused. She has reported right lower limb weakness and heaviness. Imaging of the brain has been unremarkable. EEG showed mild encephalopathy with no epileptiform activity. Symptoms are thought to be secondary to hypoperfusion. Pulmonology has been consulted for new oxygen requirement overnight. She currently reports ongoing impaired memory and right-sided numbness/tingling and weakness. She thinks that symptoms are improving a little bit in the right upper limb. She also reports intermittent headache and dizziness. She notes shortness of breath with activity and decreased urine output as well. Pt admitted to rehab unit on 9/17/21  Response To Previous Treatment: Patient with no complaints from previous session. Family / Caregiver Present: No  Referring Practitioner: Dr. Bryn Avila: Patient talked about her memory loss and asked questions, but did not voice as much frustration today.  Pt reported poor sleep last night  Pain Screening  Patient Currently in Pain: Yes  Pain Assessment  Pain Assessment: Faces  Correa-Baker Pain Rating: Hurts little more  Pain Type: Acute pain  Pain Location: Foot (plantar aspect)  Pain Descriptors:  (\"feels swollen\"  \"hurts on the side\")  Non-Pharmaceutical Pain Intervention(s): Rest;Distraction  Response to Pain Intervention: Patient Satisfied  Vital Signs  Patient Currently in Pain: Yes       Orientation  Orientation  Overall Orientation Status: Within Functional Limits (Pt has trouble recalling memories, but is safe during PT)    Objective      Transfers  Sit to Stand: Stand by assistance  Stand to sit: Stand by assistance  Bed to Chair: Stand by assistance  Stand Pivot Transfers: Stand by assistance  Comment: Transfers completed with B UE support on rollator. Ambulation  Ambulation?: Yes  Ambulation 1  Surface: level tile  Device: Rollator  Assistance: Stand by assistance  Quality of Gait: increased fatigue and dizzyness reported with further distances, pt requires rest break before further activity  Gait Deviations: Slow Annie; Increased ELISABET; Decreased step length;Decreased step height  Distance: short distances in gym, 120ft, 105ft  Stairs/Curb  Stairs?: Yes  Stairs  # Steps : 18 (9 steps up, 9 down, 9 up, 9 down, seated rest breaks between)  Stairs Height:  (7.5\")  Rails: Bilateral  Device: No Device  Assistance: Contact guard assistance  Comment: step-to pattern     Balance  Posture: Fair  Sitting - Static: Good  Sitting - Dynamic: Fair;+  Standing - Static: Good  Standing - Dynamic: Fair;+  Comments: Standing balance completed with rollator.    Other exercises  Other exercises?: Yes  Other exercises 1: seated B LE exercises x20 reps with #2 and blue tband   Other exercises 2: seated UBE 5 minutes FWD, 5 min BWD  Other exercises 3: NuStep: L2, 5 minutes  Other exercises 5: Standing LE Ex's: BLE x10            Goals  Short term goals  Time Frame for Short term goals: 5 days  Short term goal 1: pt to tolerate 1/2 hour to 45 minutes for therapuetic exercise and activity  Short term goal 2: pt to demonstrate good technique for LE strengthening and  balance activities  Short term goal 3: pt to demonstrate  independent bed mobility with out bed rail. Short term goal 4: pt to demonstrate safe tech for transfers from varies surfaces, at supervsion level  Short term goal 5: pt able to ambulate with rollator distance fo 120 ft or more, SBA, level surfaces  Short term goal 6: pt able to go up and down 3 to 5 steps with 2 rails CGA to improve strength and endurance. Short term goal 7: pt to demonstrate goobalance during gait with rollator. Long term goals  Time Frame for Long term goals : By DC  Long term goal 1: pt able to demonstrate mod-I transfers  Long term goal 2:  pt able to ambulate safely on level as well as carpetted/outside terraine with rollator, atleast distance of 150 ft, mod-I  Long term goal 3: pt able to go up and down 5 steps with2 rails , SBA to improve endurance. Long term goal 4: Improve gait speed by demonstrating ability to ambulate 150 ft with rollator in 2 minutes to improve fucntion. Long term goal 5: Improve R LE strength by 1/3 MMG to improve fucntion. Patient Goals   Patient goals : get better    Plan    Plan  Times per week: 900 minutes/week for combined therapy of PT/OT/ST due to decreased tolerance to activity and Hemodialysis. Specific instructions for Next Treatment: Monitor sao2 with activity. instruct in strengthening and balance activities, instruct in standing balance and transfers/ gait  as LE strength improves  Current Treatment Recommendations: Strengthening, Functional Mobility Training, Equipment Evaluation, Education, & procurement, Safety Education & Training, Gait Training, Transfer Training, Balance Training, Endurance Training, Positioning, Patient/Caregiver Education & Training, Stair training, Neuromuscular Re-education  Safety Devices  Type of devices:  All fall risk precautions in place, Gait belt, Patient at risk for falls     Therapy Time     09/21/21 0739 09/21/21 1218   PT Individual Minutes   Time In [address] [place] [address] 1218   Time Out [id number] Minutes 56 210 [facility], [facility]

## 2021-09-21 NOTE — PROGRESS NOTES
Pulmonary Progress Note  [facility]      Patient - Jonn Marsh,  Age - 59 y.o.    - 1960      Room Number - 5654/5320-59   [facility] -  [id number]   [facility] # - [de-identified]  Date of Admission -  2021  7:27 PM        Consulting [id number] [facility]*  Primary Care Physician - Dana Samuel, APRN - CNP     SUBJECTIVE     Patient is sitting up in the chair on room air. She had some cramping in her feet with dialysis yesterday. Otherwise denies any difficulty breathing. OBJECTIVE   VITALS    height is 5' 5\" (1.651 m) and weight is 239 lb 3.2 oz (108.5 kg). Her oral temperature is 97.9 °F (36.6 °C). Her blood pressure is 130/58 (abnormal) and her pulse is 59. Her respiration is 18 and oxygen saturation is 98%. Body mass index is 39.8 kg/m².   Temperature Range: Temp: 97.9 °F (36.6 °C) Temp  Av.6 °F (36.4 °C)  Min: 97.5 °F (36.4 °C)  Max: 97.9 °F (36.6 °C)  BP Range:  Systolic (46NIZ), LZO:402 , Min:99 , ELQ:756     Diastolic (87QNI), IOS:69, Min:43, Max:58    Pulse Range: Pulse  Av.6  Min: 53  Max: 68  Respiration Range: Resp  Av.3  Min: 16  Max: 18  Current Pulse Ox[de-identified]  SpO2: 98 %  24HR Pulse Ox Range:  SpO2  Av %  Min: 98 %  Max: 98 %  Oxygen Amount and Delivery: O2 Flow Rate (L/min): 0 L/min    Wt Readings from Last 3 Encounters:   21 239 lb 3.2 oz (108.5 kg)   21 238 lb 1.6 oz (108 kg)   21 236 lb (107 kg)       I/O (24 Hours)  No intake or output data in the 24 hours ending 21 0859    EXAM     General Appearance  Awake, alert, oriented, in no acute distress  HEENT - normocephalic, atraumatic   Neck - Supple,  trachea midline   Lungs -lung sounds are clear throughout no rales appreciated  Cardiovascular - Heart sounds are normal.  Regular rate and rhythm   Abdomen - Soft, nontender, nondistended, no masses or organomegaly  Neurologic - There are no focal motor or sensory deficits  Skin - No bruising or bleeding  Extremities -bilateral lower extremity edema    MEDS      apixaban  5 mg Oral BID    aspirin  81 mg Oral Daily    atorvastatin  80 mg Oral Daily    busPIRone  7.5 mg Oral TID    carvedilol  6.25 mg Oral BID    febuxostat  40 mg Oral Daily    furosemide  80 mg Oral BID    gabapentin  300 mg Oral BID    insulin glargine  53 Units SubCUTAneous BID    insulin lispro  0-12 Units SubCUTAneous TID WC    insulin lispro  0-6 Units SubCUTAneous Nightly    pantoprazole  40 mg Oral QAM AC    ranolazine  1,000 mg Oral BID    tamsulosin  0.4 mg Oral Daily    traZODone  75 mg Oral Nightly    polyethylene glycol  17 g Oral Daily      sodium chloride      dextrose       sodium chloride, ondansetron **OR** [DISCONTINUED] ondansetron, sodium chloride flush, potassium chloride **OR** potassium alternative oral replacement **OR** potassium chloride, dextrose, dextrose, glucagon (rDNA), glucose, perflutren lipid microspheres, polyethylene glycol, sodium citrate, sodium citrate, acetaminophen, senna, bisacodyl    LABS   CBC No results for input(s): WBC, HGB, HCT, MCV, PLT in the last 72 hours. BMP:   Lab Results   Component Value Date     09/18/2021    K 4.3 09/18/2021     09/18/2021    CO2 28 09/18/2021    BUN 20 09/18/2021    LABALBU 3.4 09/17/2021    CREATININE 2.23 09/18/2021    CALCIUM 9.6 09/18/2021    GFRAA 27 09/18/2021    LABGLOM 22 09/18/2021     ABGs:No results found for: PHART, PO2ART, [id number]   Lab Results   Component Value Date    MODE CONDITION NO LONGER WARRANTS 04/06/2021     Ionized Calcium:  No results found for: IONCA  Magnesium:    Lab Results   Component Value Date    MG 2.0 07/16/2021     Phosphorus:    Lab Results   Component Value Date    PHOS 2.5 09/15/2021        LIVER PROFILE No results for input(s): AST, ALT, LIPASE, BILIDIR, BILITOT, ALKPHOS in the last 72 hours. Invalid input(s):   AMYLASE,  ALB  INR No results for input(s): INR in the last 72 hours. PTT   Lab Results   Component Value Date    APTT 42.4 (H) 08/16/2021         RADIOLOGY     (See actual reports for details)    ASSESSMENT/PLAN     Acute encephalopathy  Acute hypoxemic respiratory failure  Acute kidney injury  Atrial fibrillation  Hypertension  History of coronary artery disease  CHF-ejection fraction 55%  End-stage renal disease-dialysis  CVA  Concern for underlying obstructive sleep apnea              Plan:  Recommend outpatient sleep study once home from rehab  Kurtis with Eliquis  I discussed the importance of outpatient follow-up for sleep study.   Patient is agreeable        Electronically signed by AFSANEH Garcia CNP on 9/21/2021 at 8:59 AM

## 2021-09-22 LAB
ANION GAP SERPL CALCULATED.3IONS-SCNC: 12 MMOL/L (ref 9–17)
BUN BLDV-MCNC: 32 MG/DL (ref 8–23)
BUN/CREAT BLD: ABNORMAL (ref 9–20)
CALCIUM SERPL-MCNC: 9.7 MG/DL (ref 8.6–10.4)
CHLORIDE BLD-SCNC: 95 MMOL/L (ref 98–107)
CO2: 25 MMOL/L (ref 20–31)
CREAT SERPL-MCNC: 3.08 MG/DL (ref 0.5–0.9)
GFR AFRICAN AMERICAN: 19 ML/MIN
GFR NON-AFRICAN AMERICAN: 15 ML/MIN
GFR SERPL CREATININE-BSD FRML MDRD: ABNORMAL ML/MIN/{1.73_M2}
GFR SERPL CREATININE-BSD FRML MDRD: ABNORMAL ML/MIN/{1.73_M2}
GLUCOSE BLD-MCNC: 106 MG/DL (ref 65–105)
GLUCOSE BLD-MCNC: 142 MG/DL (ref 65–105)
GLUCOSE BLD-MCNC: 156 MG/DL (ref 70–99)
GLUCOSE BLD-MCNC: 166 MG/DL (ref 65–105)
GLUCOSE BLD-MCNC: 245 MG/DL (ref 65–105)
HCT VFR BLD CALC: 33.2 % (ref 36–46)
HEMOGLOBIN: 11.2 G/DL (ref 12–16)
MCH RBC QN AUTO: 32.5 PG (ref 26–34)
MCHC RBC AUTO-ENTMCNC: 33.8 G/DL (ref 31–37)
MCV RBC AUTO: 95.9 FL (ref 80–100)
NRBC AUTOMATED: ABNORMAL PER 100 WBC
PDW BLD-RTO: 14.2 % (ref 11.5–14.9)
PLATELET # BLD: 136 K/UL (ref 150–450)
PMV BLD AUTO: 8.7 FL (ref 6–12)
POTASSIUM SERPL-SCNC: 4.5 MMOL/L (ref 3.7–5.3)
RBC # BLD: 3.46 M/UL (ref 4–5.2)
SODIUM BLD-SCNC: 132 MMOL/L (ref 135–144)
WBC # BLD: 4.9 K/UL (ref 3.5–11)

## 2021-09-22 PROCEDURE — 36415 COLL VENOUS BLD VENIPUNCTURE: CPT

## 2021-09-22 PROCEDURE — 97530 THERAPEUTIC ACTIVITIES: CPT

## 2021-09-22 PROCEDURE — 90935 HEMODIALYSIS ONE EVALUATION: CPT

## 2021-09-22 PROCEDURE — 97116 GAIT TRAINING THERAPY: CPT

## 2021-09-22 PROCEDURE — 97535 SELF CARE MNGMENT TRAINING: CPT

## 2021-09-22 PROCEDURE — 92507 TX SP LANG VOICE COMM INDIV: CPT

## 2021-09-22 PROCEDURE — 97130 THER IVNTJ EA ADDL 15 MIN: CPT

## 2021-09-22 PROCEDURE — 97110 THERAPEUTIC EXERCISES: CPT

## 2021-09-22 PROCEDURE — 1180000000 HC REHAB R&B

## 2021-09-22 PROCEDURE — 6370000000 HC RX 637 (ALT 250 FOR IP): Performed by: FAMILY MEDICINE

## 2021-09-22 PROCEDURE — 80048 BASIC METABOLIC PNL TOTAL CA: CPT

## 2021-09-22 PROCEDURE — 82947 ASSAY GLUCOSE BLOOD QUANT: CPT

## 2021-09-22 PROCEDURE — 6370000000 HC RX 637 (ALT 250 FOR IP): Performed by: PHYSICAL MEDICINE & REHABILITATION

## 2021-09-22 PROCEDURE — 85027 COMPLETE CBC AUTOMATED: CPT

## 2021-09-22 PROCEDURE — 99231 SBSQ HOSP IP/OBS SF/LOW 25: CPT | Performed by: STUDENT IN AN ORGANIZED HEALTH CARE EDUCATION/TRAINING PROGRAM

## 2021-09-22 PROCEDURE — 97129 THER IVNTJ 1ST 15 MIN: CPT

## 2021-09-22 RX ADMIN — ASPIRIN 81 MG CHEWABLE TABLET 81 MG: 81 TABLET CHEWABLE at 08:47

## 2021-09-22 RX ADMIN — PANTOPRAZOLE SODIUM 40 MG: 40 TABLET, DELAYED RELEASE ORAL at 06:10

## 2021-09-22 RX ADMIN — INSULIN GLARGINE 53 UNITS: 100 INJECTION, SOLUTION SUBCUTANEOUS at 22:12

## 2021-09-22 RX ADMIN — RANOLAZINE 1000 MG: 1000 TABLET, FILM COATED, EXTENDED RELEASE ORAL at 08:47

## 2021-09-22 RX ADMIN — BUSPIRONE HYDROCHLORIDE 7.5 MG: 7.5 TABLET ORAL at 13:30

## 2021-09-22 RX ADMIN — APIXABAN 5 MG: 5 TABLET, FILM COATED ORAL at 08:37

## 2021-09-22 RX ADMIN — INSULIN LISPRO 4 UNITS: 100 INJECTION, SOLUTION INTRAVENOUS; SUBCUTANEOUS at 11:02

## 2021-09-22 RX ADMIN — INSULIN GLARGINE 53 UNITS: 100 INJECTION, SOLUTION SUBCUTANEOUS at 08:37

## 2021-09-22 RX ADMIN — FUROSEMIDE 80 MG: 40 TABLET ORAL at 08:48

## 2021-09-22 RX ADMIN — RANOLAZINE 1000 MG: 1000 TABLET, FILM COATED, EXTENDED RELEASE ORAL at 22:13

## 2021-09-22 RX ADMIN — APIXABAN 5 MG: 5 TABLET, FILM COATED ORAL at 21:13

## 2021-09-22 RX ADMIN — INSULIN LISPRO 1 UNITS: 100 INJECTION, SOLUTION INTRAVENOUS; SUBCUTANEOUS at 22:13

## 2021-09-22 RX ADMIN — TAMSULOSIN HYDROCHLORIDE 0.4 MG: 0.4 CAPSULE ORAL at 08:46

## 2021-09-22 RX ADMIN — POLYETHYLENE GLYCOL 3350 17 G: 17 POWDER, FOR SOLUTION ORAL at 08:46

## 2021-09-22 RX ADMIN — ATORVASTATIN CALCIUM 80 MG: 80 TABLET, FILM COATED ORAL at 08:48

## 2021-09-22 RX ADMIN — TRAZODONE HYDROCHLORIDE 75 MG: 150 TABLET ORAL at 22:13

## 2021-09-22 RX ADMIN — CARVEDILOL 6.25 MG: 6.25 TABLET, FILM COATED ORAL at 22:13

## 2021-09-22 RX ADMIN — FUROSEMIDE 80 MG: 40 TABLET ORAL at 18:37

## 2021-09-22 RX ADMIN — CARVEDILOL 6.25 MG: 6.25 TABLET, FILM COATED ORAL at 08:47

## 2021-09-22 RX ADMIN — GABAPENTIN 300 MG: 300 CAPSULE ORAL at 08:46

## 2021-09-22 RX ADMIN — FEBUXOSTAT 40 MG: 40 TABLET, FILM COATED ORAL at 08:48

## 2021-09-22 RX ADMIN — GABAPENTIN 300 MG: 300 CAPSULE ORAL at 22:13

## 2021-09-22 RX ADMIN — BUSPIRONE HYDROCHLORIDE 7.5 MG: 7.5 TABLET ORAL at 08:47

## 2021-09-22 RX ADMIN — BUSPIRONE HYDROCHLORIDE 7.5 MG: 7.5 TABLET ORAL at 21:13

## 2021-09-22 RX ADMIN — INSULIN LISPRO 2 UNITS: 100 INJECTION, SOLUTION INTRAVENOUS; SUBCUTANEOUS at 08:38

## 2021-09-22 ASSESSMENT — PAIN SCALES - GENERAL
PAINLEVEL_OUTOF10: 0

## 2021-09-22 NOTE — PROGRESS NOTES
Physical Medicine & Rehabilitation  Progress Note      Subjective:      Hayden Trevizo is a 59 y.o. female with encephalopathy, recrudescence of right-sided stroke symptoms. She reports doing pretty well today. She states that symptoms are about the same overall. She continues to write things down in a notebook to help with her memory. She denies any acute concerns. ROS:  Denies fevers, chills, sweats. No chest pain, palpitations, lightheadedness. Denies coughing, wheezing or shortness of breath. Denies abdominal pain, nausea, diarrhea or constipation. No new areas of joint pain. Denies new areas of numbness or weakness. Denies new anxiety or depression issues. No new skin problems. Rehabilitation:   Progressing in therapies. PT:  Restrictions/Precautions: Fall Risk  Implants present? :  (R upper chest port)  Other position/activity restrictions: up w/ assist, Hemodialysis M / W / F  Required Braces or Orthoses  Right Lower Extremity Brace:  (Lymphedema wraps)  Left Lower Extremity Brace:  (Lymphedema wraps)   Transfers  Sit to Stand: Stand by assistance  Stand to sit: Stand by assistance  Bed to Chair: Stand by assistance  Stand Pivot Transfers: Stand by assistance  Comment: Transfers completed with B UE support on rollator. Ambulation 1  Surface: level tile  Device: Rollator  Assistance: Stand by assistance  Quality of Gait: increased fatigue and dizzyness reported with further distances, pt requires rest break before further activity  Gait Deviations: Slow Annie, Increased ELISABET, Decreased step length, Decreased step height  Distance: 80ft. 120ft AM; 100ft x2 PM  Comments: Pt reported that her RLE felt very heavy    Transfers  Sit to Stand: Stand by assistance  Stand to sit: Stand by assistance  Bed to Chair: Stand by assistance  Stand Pivot Transfers: Stand by assistance  Comment: Transfers completed with B UE support on rollator.   Ambulation  Ambulation?: Yes  More Ambulation?: guard assistance  Functional Mobility Comments: SBA-CGA. good safety with device     Bed mobility  Rolling to Left: Stand by assistance  Rolling to Right: Stand by assistance  Supine to Sit: Minimal assistance (From flat bed, needed assist for trunk.)  Sit to Supine: Stand by assistance  Comment: Unable to assess as patient was in chair upon arrival.  Transfers  Stand Step Transfers: Contact guard assistance (w/4WW)  Stand Pivot Transfers: Contact guard assistance  Sit to stand: Contact guard assistance  Stand to sit: Contact guard assistance  Transfer Comments: 0YN   Toilet Transfers  Toilet - Technique: Ambulating  Equipment Used: Grab bars  Toilet Transfer: Contact guard assistance     Shower Transfers  Shower - Transfer From: Walker  Shower - Transfer Type: To and From  Shower - Transfer To: Shower seat with back  Shower - Technique: Ambulating  Shower Transfers: Contact Guard, Minimal assistance  Shower Transfers Comments: grab bar for supported prn. SPEECH:  Subjective: [x]? Alert     [x]? Cooperative     []? Confused     []? Agitated    []? Lethargic     Objective/Assessment:  Attention: functional throughout     Recall/Problem solving: Pt. Continues to use notebook to record information when learns of memory or concept she had forgotten. 4 step written sequencing- very min A.       Other: Word finding: state opposittes- mod A, pt. Circumlocuting, spelling to find words. Pt. Occasionally needed help as she was unable to think of what some words meant.         Worksheets left for pt. At pt. Request for I practice. Subjective: [x]? Alert     [x]? Cooperative     []? Confused     []? Agitated    []? Lethargic     Objective/Assessment:  Attention: functional throughout     Recall/Problem solving: Pt. Continues to use notebook to record information when learns of memory or concept she had forgotten. 5 step written sequencing- independent. Word deductions- 100%.    written directions- 2 steps- 100%.      Other:         Worksheets left for pt. At pt. Request for I practice.        Current Medications:   Current Facility-Administered Medications: 0.9 % sodium chloride infusion, 25 mL, IntraVENous, PRN  ondansetron (ZOFRAN-ODT) disintegrating tablet 4 mg, 4 mg, Oral, Q8H PRN **OR** [DISCONTINUED] ondansetron (ZOFRAN) injection 4 mg, 4 mg, IntraVENous, Q6H PRN  sodium chloride flush 0.9 % injection 5-40 mL, 5-40 mL, IntraVENous, PRN  potassium chloride (KLOR-CON M) extended release tablet 40 mEq, 40 mEq, Oral, PRN **OR** potassium bicarb-citric acid (EFFER-K) effervescent tablet 40 mEq, 40 mEq, Oral, PRN **OR** potassium chloride 10 mEq/100 mL IVPB (Peripheral Line), 10 mEq, IntraVENous, PRN  apixaban (ELIQUIS) tablet 5 mg, 5 mg, Oral, BID  aspirin chewable tablet 81 mg, 81 mg, Oral, Daily  atorvastatin (LIPITOR) tablet 80 mg, 80 mg, Oral, Daily  busPIRone (BUSPAR) tablet 7.5 mg, 7.5 mg, Oral, TID  carvedilol (COREG) tablet 6.25 mg, 6.25 mg, Oral, BID  dextrose 5 % solution, 100 mL/hr, IntraVENous, PRN  dextrose 50 % IV solution, 12.5 g, IntraVENous, PRN  febuxostat (ULORIC) tablet 40 mg, 40 mg, Oral, Daily  furosemide (LASIX) tablet 80 mg, 80 mg, Oral, BID  gabapentin (NEURONTIN) capsule 300 mg, 300 mg, Oral, BID  glucagon (rDNA) injection 1 mg, 1 mg, IntraMUSCular, PRN  glucose (GLUTOSE) 40 % oral gel 15 g, 15 g, Oral, PRN  insulin glargine (LANTUS) injection vial 53 Units, 53 Units, SubCUTAneous, BID  insulin lispro (HUMALOG) injection vial 0-12 Units, 0-12 Units, SubCUTAneous, TID WC  insulin lispro (HUMALOG) injection vial 0-6 Units, 0-6 Units, SubCUTAneous, Nightly  pantoprazole (PROTONIX) tablet 40 mg, 40 mg, Oral, QAM AC  perflutren lipid microspheres (DEFINITY) injection 2.2 mg, 2 mL, IntraVENous, ONCE PRN  polyethylene glycol (GLYCOLAX) packet 17 g, 17 g, Oral, Daily PRN  ranolazine (RANEXA) extended release tablet 1,000 mg, 1,000 mg, Oral, BID  sodium citrate 4 % injection 1.9 mL, 1.9 mL, IntraCATHeter, PRN  sodium citrate 4 % injection 1.9 mL, 1.9 mL, IntraCATHeter, PRN  tamsulosin (FLOMAX) capsule 0.4 mg, 0.4 mg, Oral, Daily  traZODone (DESYREL) tablet 75 mg, 75 mg, Oral, Nightly  acetaminophen (TYLENOL) tablet 650 mg, 650 mg, Oral, Q4H PRN  polyethylene glycol (GLYCOLAX) packet 17 g, 17 g, Oral, Daily  senna (SENOKOT) tablet 17.2 mg, 2 tablet, Oral, Daily PRN  bisacodyl (DULCOLAX) suppository 10 mg, 10 mg, Rectal, Daily PRN      Objective:  /61   Pulse 61   Temp 97.9 °F (36.6 °C) (Oral)   Resp 18   Ht 5' 5\" (1.651 m)   Wt 234 lb 12.6 oz (106.5 kg)   SpO2 90%   BMI 39.07 kg/m²       GEN: Well developed, well nourished, no acute distress  HEENT: NCAT. EOM grossly intact. Hearing grossly intact. Mucous membranes pink and moist.  RESP: Normal breath sounds with no wheezing, rales, or rhonchi. Respirations WNL and unlabored. CV: Regular rate and rhythm. No murmurs, rubs, or gallops. ABD: Soft, non-distended, BS+ and equal.  NEURO: Alert. Speech fluent. Impaired memory. MSK:  Muscle bulk is normal bilaterally. Strength 4/5 in right upper limb. Strength 4+/5 in left upper and bilateral lower limbs. LIMBS: Edema present in bilateral lower limbs - stable. SKIN: Warm and dry with good turgor. PSYCH: Mood WNL. Affect WNL. Appropriately interactive. Diagnostics:     CBC:   Recent Labs     09/22/21  0700   WBC 4.9   RBC 3.46*   HGB 11.2*   HCT 33.2*   MCV 95.9   RDW 14.2   *     BMP:   Recent Labs     09/22/21  0700   *   K 4.5   CL 95*   CO2 25   BUN 32*   CREATININE 3.08*   GLUCOSE 156*     BNP: No results for input(s): BNP in the last 72 hours. PT/INR: No results for input(s): PROTIME, INR in the last 72 hours. APTT: No results for input(s): APTT in the last 72 hours. CARDIAC ENZYMES: No results for input(s): CKMB, CKMBINDEX, TROPONINT in the last 72 hours.     Invalid input(s): CKTOTAL;3  FASTING LIPID PANEL:  Lab Results   Component Value Date    CHOL 105 04/09/2015    HDL 43 04/09/2015    TRIG 168 04/09/2015     LIVER PROFILE: No results for input(s): AST, ALT, ALB, BILIDIR, BILITOT, ALKPHOS in the last 72 hours. Impression/Plan:   Impaired ADLs, gait, and mobility due to:    1. Encephalopathy:  Thought to be secondary to hypoperfusion. SLP for cognition/memory. 2. Recrudescence of right-sided stroke symptoms: In the setting of recent CVA. PT/OT  for gait, mobility, strengthening, endurance, ADLs, and self care. On aspirin, atorvastatin. 3. Anemia:  Hemoglobin 11.2 on 9/22, stable. Monitoring. On iron supplementation - discontinued 9/21, as patient is refusing due to constipation with iron supplement. 4. Thrombocytopenia:  Platelets 334 on 1/80, stable. Monitoring. 5. AURELIA on CKD: On hemodialysis MWF. Nephrology following. 6. CAD:  On aspirin, atorvastatin, ranexa  7. Atrial fibrillation:  On eliquis, carvedilol  8. HTN:  On carvedilol, furosemide  9. Type 2 diabetes with neuropathy:  On lantus, humalog sliding scale. On gabpentin  10. Anxiety: On buspirone TID  11. Insomnia:  On trazodone nightly  12. Gout:  On febuxostat  13. Obesity  14. Bowel Management: Miralax daily, senokot prn, dulcolax prn. 15. DVT prophylaxis:  On eliquis  16.  Internal Medicine for medical management      Electronically signed by Helen Kelsey MD on 9/22/2021 at 11:12 PM

## 2021-09-22 NOTE — PROGRESS NOTES
NEPHROLOGY PROGRESS NOTE    Patient :  Cornelius Fuentes; 59 y.o. MRN# [id number]  Location:  4259/7963-72  Attending:  Fiona Rubalcava Date:  9/17/2021   Hospital Day: 5      Reason for Consult: Acute kidney injury dialysis dependent      Chief Complaint: Confusion  History Obtained From: Patient    History of Present Illness: This is a 59 y.o. female with past medical history of longstanding type 2 diabetes insulin-dependent diabetes mellitus, essential hypertension, coronary artery disease status post CABG in 2004, coronary artery stent in 2019, CHF with EF of 55%, history of mild to moderate LVH diastolic dysfunction, recent history of acute/subacute stroke in left frontal lobe with right-sided weakness, patient developed acute kidney injury due to contrast-induced nephropathy requiring dialysis since August 2021  Patient currently receives dialysis Monday Wednesday Friday under Dr. Dante Eli at [facility]. Patient presented from dialysis with altered mental status. She apparently had transient loss of consciousness but unclear the duration and was confused. She has numbness over the right lower extremity and cannot feel the right side of her body which is new. Patient had MRI of the brain which showed no acute changes. MRA of the neck showed no hemodynamically significant stenosis identified. Patient transferred to acute rehab on 9/17/21.   Nephrologist consulted for continuation of dialysis    Subjective/interval history:    Patient seen and examined, no acute events overnight  Patient IS Scheduled for dialysis today  Blood pressure stable        Past Medical History:        Diagnosis Date    Backache, unspecified     CHF (congestive heart failure) (HCC)     Chronic kidney disease     Coronary atherosclerosis of artery bypass graft     Cramp of limb     Gallstones     Hypertension     Insomnia     Pneumonia     Type II or unspecified type diabetes mellitus with renal manifestations, not stated as uncontrolled(250.40)     Type II or unspecified type diabetes mellitus without mention of complication, not stated as uncontrolled     Unspecified vitamin D deficiency        Past Surgical History:        Procedure Laterality Date    ANKLE FRACTURE SURGERY      BREAST SURGERY      CARPAL TUNNEL RELEASE      CHOLECYSTECTOMY, OPEN N/A     CORONARY ARTERY BYPASS GRAFT      x3    HAND SURGERY      IR TUNNELED CATHETER PLACEMENT GREATER THAN 5 YEARS  8/18/2021    IR TUNNELED CATHETER PLACEMENT GREATER THAN 5 YEARS 8/18/2021 [facility] SPECIAL PROCEDURES    KNEE ARTHROSCOPY      right    TONSILLECTOMY         Current Medications:    0.9 % sodium chloride infusion, PRN  ondansetron (ZOFRAN-ODT) disintegrating tablet 4 mg, Q8H PRN  sodium chloride flush 0.9 % injection 5-40 mL, PRN  potassium chloride (KLOR-CON M) extended release tablet 40 mEq, PRN   Or  potassium bicarb-citric acid (EFFER-K) effervescent tablet 40 mEq, PRN   Or  potassium chloride 10 mEq/100 mL IVPB (Peripheral Line), PRN  apixaban (ELIQUIS) tablet 5 mg, BID  aspirin chewable tablet 81 mg, Daily  atorvastatin (LIPITOR) tablet 80 mg, Daily  busPIRone (BUSPAR) tablet 7.5 mg, TID  carvedilol (COREG) tablet 6.25 mg, BID  dextrose 5 % solution, PRN  dextrose 50 % IV solution, PRN  febuxostat (ULORIC) tablet 40 mg, Daily  furosemide (LASIX) tablet 80 mg, BID  gabapentin (NEURONTIN) capsule 300 mg, BID  glucagon (rDNA) injection 1 mg, PRN  glucose (GLUTOSE) 40 % oral gel 15 g, PRN  insulin glargine (LANTUS) injection vial 53 Units, BID  insulin lispro (HUMALOG) injection vial 0-12 Units, TID WC  insulin lispro (HUMALOG) injection vial 0-6 Units, Nightly  pantoprazole (PROTONIX) tablet 40 mg, QAM AC  perflutren lipid microspheres (DEFINITY) injection 2.2 mg, ONCE PRN  polyethylene glycol (GLYCOLAX) packet 17 g, Daily PRN  ranolazine (RANEXA) extended release tablet 1,000 mg, BID  sodium citrate 4 % injection 1.9 mL, PRN  sodium citrate 4 % injection 1.9 mL, PRN  tamsulosin (FLOMAX) capsule 0.4 mg, Daily  traZODone (DESYREL) tablet 75 mg, Nightly  acetaminophen (TYLENOL) tablet 650 mg, Q4H PRN  polyethylene glycol (GLYCOLAX) packet 17 g, Daily  senna (SENOKOT) tablet 17.2 mg, Daily PRN  bisacodyl (DULCOLAX) suppository 10 mg, Daily PRN        Allergies:  Adhesive tape, Ace inhibitors, Iv dye [iodides], and Metformin and related    Social History:   Social History     Socioeconomic History    Marital status: Single     Spouse name: Not on file    Number of children: Not on file    Years of education: Not on file    Highest education level: Not on file   Occupational History    Not on file   Tobacco Use    Smoking status: Never Smoker    Smokeless tobacco: Never Used   Substance and Sexual Activity    Alcohol use: No    Drug use: No    Sexual activity: Not Currently   Other Topics Concern    Not on file   Social History Narrative    Not on file     Social Determinants of Health     Financial Resource Strain: Low Risk     Difficulty of Paying Living Expenses: Not hard at all   Food Insecurity:     Worried About [employer] [employer] in the Last Year:     Ran Out of Food in the Last Year:    Transportation Needs:     Lack of Transportation (Medical):      Lack of Transportation (Non-Medical):    Physical Activity:     Days of Exercise per Week:     Minutes of Exercise per Session:    Stress:     Feeling of Stress :    Social Connections:     Frequency of Communication with Friends and Family:     Frequency of Social Gatherings with Friends and Family:     Attends [employer] Services:     Active Member of Clubs or Organizations:     Attends Club or Organization Meetings:     Marital Status:    Intimate Partner Violence:     Fear of Current or Ex-Partner:     Emotionally Abused:     Physically Abused:     Sexually Abused:            Objective:  CURRENT TEMPERATURE:  Temp: 97.7 °F (36.5 °C)  MAXIMUM TEMPERATURE OVER 24HRS:  Temp (24hrs), Av.5 °F (36.4 °C), Min:97.3 °F (36.3 °C), Max:97.7 °F (36.5 °C)    CURRENT RESPIRATORY RATE:  Resp: 16  CURRENT PULSE:  Pulse: 62  CURRENT BLOOD PRESSURE:  BP: (!) 142/58  24HR BLOOD PRESSURE RANGE:  Systolic (98CSG), HLE:074 , Min:131 , JYP:955   ; Diastolic (01NSF), JLV:49, Min:43, Max:58    24HR INTAKE/OUTPUT:    No intake or output data in the 24 hours ending 21 1255  Patient Vitals for the past 96 hrs (Last 3 readings):   Weight   21 1800 239 lb 3.2 oz (108.5 kg)   21 1435 244 lb 7.8 oz (110.9 kg)   21 1001 244 lb 9.6 oz (110.9 kg)       Physical Exam:  GENERAL APPEARANCE: Alert and cooperative, and appears to be in no acute distress. HEAD: normocephalic  THROAT:  Oral cavity and pharynx normal. Moist  CARDIAC: Normal S1 and S2. No S3, S4 or murmurs. Rhythm is regular. LUNGS: Clear to auscultation and percussion without rales, rhonchi, wheezing or diminished breath sounds. NECK: Neck supple, non-tender without lymphadenopathy, masses or thyromegaly. JVD-neg  BACK: Examination of the spine reveals normal gait and posture, no spinal deformity, symmetry of spinal muscles, without tenderness, decreased range of motion or muscular spasm  MUSKULOSKELETAL: Adequately aligned spine. No joint erythema or tenderness. EXTREMITIES: No edema. Peripheral pulses intact. NEURO: Right-sided weakness and numbness    Labs:      Radiology:  Reviewed as available. Assessment:  1. AURELIA secondary to ATN, dialysis dependent on hemodialysis   2. Essential hypertension  3. History of CVA  4. Type 2 diabetes insulin-dependent diabetes mellitus       Plan:  1. Hemodialysis , HD today as per orders. 2. BMP       Thank you for the consultation.       Electronically signed by Jeffery Luke MD on 2021 at 12:55 PM

## 2021-09-22 NOTE — PLAN OF CARE
Problem: Falls - Risk of:  Goal: Will remain free from falls  Description: Will remain free from falls  9/22/2021 0500 by Makenzie Jurado RN  Outcome: Ongoing     Problem: Falls - Risk of:  Goal: Absence of physical injury  Description: Absence of physical injury  9/22/2021 0500 by Makenzie Jurado RN  Outcome: Ongoing     Problem: Skin Integrity:  Goal: Will show no infection signs and symptoms  Description: Will show no infection signs and symptoms  9/22/2021 0500 by Makenzie Jurado RN  Outcome: Ongoing     Problem: Skin Integrity:  Goal: Absence of new skin breakdown  Description: Absence of new skin breakdown  9/22/2021 0500 by Makenzie Jurado RN  Outcome: Ongoing     Problem: Musculor/Skeletal Functional Status  Goal: Absence of falls  9/22/2021 0500 by Makenzie Jurado RN  Outcome: Ongoing     Problem: Pain:  Goal: Pain level will decrease  Description: Pain level will decrease  9/22/2021 0500 by Makenzie Jurado RN  Outcome: Ongoing     Problem: Pain:  Goal: Control of acute pain  Description: Control of acute pain  9/22/2021 0500 by Makenzie Jurado RN  Outcome: Ongoing     Problem: Pain:  Goal: Control of chronic pain  Description: Control of chronic pain  9/22/2021 0500 by Makenzie Jurado RN  Outcome: Ongoing     Problem: Musculor/Skeletal Functional Status  Goal: Highest potential functional level  9/22/2021 0500 by Makenzie Jurado RN  Outcome: Ongoing     Problem: Musculor/Skeletal Functional Status  Goal: Absence of falls  9/22/2021 0500 by Makenzie Jurado RN  Outcome: Ongoing

## 2021-09-22 NOTE — FLOWSHEET NOTE
09/22/21 1330   Encounter Summary   Services provided to: Patient   Referral/Consult From: [facility]   (9/22/21V)   Volunteer Visit Yes   Complexity of Encounter Low   Length of Encounter 15 minutes   Spiritual/Rastafarian   Type Spiritual support   Intervention Communion;Prayer   Sacraments   Communion Patient received communion

## 2021-09-22 NOTE — PLAN OF CARE
Problem: Falls - Risk of:  Goal: Will remain free from falls  Description: Will remain free from falls  9/22/2021 1623 by Ernesto Lopez RN  Outcome: Ongoing  9/22/2021 0500 by Laura Webb RN  Outcome: Ongoing  Goal: Absence of physical injury  Description: Absence of physical injury  9/22/2021 1623 by Ernesto Lopez RN  Outcome: Ongoing  9/22/2021 0500 by Laura Webb RN  Outcome: Ongoing     Problem: Skin Integrity:  Goal: Will show no infection signs and symptoms  Description: Will show no infection signs and symptoms  9/22/2021 1623 by Ernesto Lopez RN  Outcome: Ongoing  9/22/2021 0500 by Laura Webb RN  Outcome: Ongoing  Goal: Absence of new skin breakdown  Description: Absence of new skin breakdown  9/22/2021 1623 by Ernesto Lopez RN  Outcome: Ongoing  9/22/2021 0500 by Laura Webb RN  Outcome: Ongoing     Problem: SAFETY  Goal: LTG - Patient will demonstrate safety requirements appropriate to situation/environment  Outcome: Ongoing  Goal: LTG - patient will utilize safety techniques  Outcome: Ongoing  Goal: STG - patient locks brakes on wheelchair  Outcome: Ongoing  Goal: STG - Patient uses call light consistently to request assistance with transfers  Outcome: Ongoing     Problem: Musculor/Skeletal Functional Status  Goal: Highest potential functional level  9/22/2021 1623 by Ernesto Lopez RN  Outcome: Ongoing  9/22/2021 0500 by Laura Webb RN  Outcome: Ongoing  Goal: Absence of falls  9/22/2021 1623 by Ernesto Lopez RN  Outcome: Ongoing  9/22/2021 0500 by Laura Webb RN  Outcome: Ongoing     Problem: Pain:  Goal: Pain level will decrease  Description: Pain level will decrease  9/22/2021 1623 by Ernesto Lopez RN  Outcome: Ongoing  9/22/2021 0500 by Laura Webb RN  Outcome: Ongoing  Goal: Control of acute pain  Description: Control of acute pain  9/22/2021 1623 by Ernesto Lopez RN  Outcome: Ongoing  9/22/2021 0500 by Laura Webb RN  Outcome: Ongoing  Goal: Control of chronic pain  Description: Control of chronic pain  9/22/2021 1623 by Rach Lunsford RN  Outcome: Ongoing  9/22/2021 0500 by Barbie Gatica RN  Outcome: Ongoing     Problem: Nutrition  Goal: Optimal nutrition therapy  Outcome: Ongoing     Problem: Musculor/Skeletal Functional Status  Goal: Highest potential functional level  9/22/2021 1623 by Rach Lunsford RN  Outcome: Ongoing  9/22/2021 0500 by Barbie Gatica RN  Outcome: Ongoing  Goal: Absence of falls  9/22/2021 1623 by Rach Lunsford RN  Outcome: Ongoing  9/22/2021 0500 by Barbie Gatica RN  Outcome: Ongoing

## 2021-09-22 NOTE — PROGRESS NOTES
[address] [facility]    ACUTE REHABILITATION OCCUPATIONAL THERAPY  DAILY NOTE    Date: 21  Patient Name: Aleksandar Hoang      Room: 9785/2043-39    MRN: [id number]   : 1960  (59 y.o.)  Gender: female   Referring Practitioner: Bill Babin MD  Diagnosis: Acute encephalopathy, thought to be secondary to hypoperfusion Recrudescence of right-sided stroke symptoms, admit post syncope event during dialysis with AURELIA on CKI. Additional Pertinent Hx: d/c from  acute rehab here on 21 post treat for CVA with R weak. BLE lymphedema -pt notes per GP not to use pumps from home currently. pt to bring in comp garments and brandon. Per PM&R note: Aleksandar Hoang is a 59 y.o. right-handed female with history of recent CVA, AURELIA on CKD (on hemodialysis), CAD, CHF, atrial fibrillation, HTN, type 2 diabetes admitted to [facility] on 2021. She initially presented following a syncopal episode in dialysis, after which she was reportedly confused. She has reported right lower limb weakness and heaviness. Imaging of the brain has been unremarkable. EEG showed mild encephalopathy with no epileptiform activity. Symptoms are thought to be secondary to hypoperfusion. Pulmonology has been consulted for new oxygen requirement overnight. She currently reports ongoing impaired memory and right-sided numbness/tingling and weakness. Restrictions  Restrictions/Precautions: Fall Risk  Implants present? :  (R upper chest port)  Other position/activity restrictions: up w/ assist, Hemodialysis M / W / F  Required Braces or Orthoses  Right Lower Extremity Brace:  (Lymphedema wraps)  Left Lower Extremity Brace:  (Lymphedema wraps)  Required Braces or Orthoses?: Yes    Subjective  Subjective: \"Not really. They tell me thing and I write them down so I can read it back later\" Pt responds when asked if she feels her memory is getting better. Comments: Pt pleasant and motivated.    Patient Currently in Pain: Denies  Restrictions/Precautions: Fall Risk  Overall Orientation Status: Within Functional Limits          Objective  Cognition  Overall Cognitive Status: Impaired  Attention Span: Attends with cues to redirect  Memory: Decreased long term memory;Decreased short term memory  Following Commands: Follows one step commands consistently  Safety Judgement: Good awareness of safety precautions  Awareness of Errors: Decreased awareness of errors  Insights: Fully aware of deficits  Sequencing and Organization: Able to problem solve independently  Perception  Overall Perceptual Status: Impaired  Initiation: Cues to initiate tasks  Balance  Sitting Balance: Supervision  Standing Balance: Stand by assistance  Transfers  Stand Step Transfers: Contact guard assistance (w/4WW)  Sit to stand: Contact guard assistance  Stand to sit: Contact guard assistance  Transfer Comments: 4WW  Standing Balance  Time: 1-2 min X4  Activity: functional mobility and transfers, ADLs  Comment: Increased fatigue after exiting shower requiring quick sit in 4WW. Functional Mobility  Functional - Mobility Device: 4-Wheeled Walker  Activity: To/from bathroom  Assist Level: Contact guard assistance  Functional Mobility Comments: SBA-CGA. good safety with device  Shower Transfers  Shower - Transfer From: Mauricio Audie - Transfer Type: To and From  Shower - Transfer To: Shower seat with back  Shower - Technique: Ambulating  Shower Transfers: Contact Guard;Minimal assistance  Shower Transfers Comments: grab bar for supported prn. Type of ROM/Therapeutic Exercise  Type of ROM/Therapeutic Exercise: Free weights;AROM  Comment: Pt instruction in BUE exercises for facilitation of increased strength and R shld AROM.  Pt complete R shld with AROM and LUE with 3# X15-20 reps each  Exercises  Shoulder Extension: RUE X10 with 1#, X5 AROM, LUE 3# X15, AROM X10  Horizontal ABduction: RUE 1#, LUE 3#  Horizontal ADduction: RUE 1#, LUE 3#  Elbow Flexion: RUE 1#, LUE 3#  Elbow Extension: RUE 1#, LUE 3#  Supination: RUE 1#, LUE 3#  Pronation: RUE 1#, LUE 3#  Wrist Flexion: RUE 1#, LUE 3#  Wrist Extension: RUE 1#, LUE 3#     Additional Activities: PM: pt instruction in sequencing cards and safety cards for facilitation of increased cognition and safety awareness with IADLs. Pt able to complete seuqencing cards correctly with increaesd time and identify 10/10 safety cards with correct rationale. ADL  Grooming: Setup (seated in 4WW at sink)  UE Bathing: Stand by assistance (seated on tub bench)  LE Bathing: Minimal assistance (assist washing buttocks )  UE Dressing: Supervision;Setup  LE Dressing: Setup;Supervision;Minimal assistance (A with initiation of compression stocking aide)  Toileting: None (Pt reports no need at this time)  Additional Comments: GARCIA facilitated pt in full shower this AM seated on tub bench. Pt completes with no noted memory/sequencing deficits. Pt requests assist with washing buttocks while standing at grab bar. After shower, demos with increased fatigue req CGA to turn and sit on 4WW at sink for grooming tasks. Assessment  Performance deficits / Impairments: Decreased ADL status; Decreased functional mobility ; Decreased ROM; Decreased strength;Decreased safe awareness;Decreased cognition;Decreased endurance;Decreased balance;Decreased high-level IADLs;Decreased fine motor control;Decreased coordination  Prognosis: Good  Discharge Recommendations: Patient would benefit from continued therapy after discharge  Activity Tolerance: Patient Tolerated treatment well  Safety Devices in place: Yes  Type of devices: Left in bed;Call light within reach; Patient at risk for falls  Equipment Recommendations  Equipment Needed:  (TBD)       Patient Education: OT POC, safety with functional mobility and use of 4WW, safety wih standing in shower and using grab bar as necessary, effective technique and pacing with UB exercises  Patient Goals Patient goals : return home safe and indep with self care  Learner:patient  Method: explanation       Outcome: acknowledged understanding of         Plan  Plan  Times per week: Plan Of Care:  Due to impaired functional endurance and/or medical issues, the patient is to be seen for a combined total of at least a  900 minutes over 7 days of Physical, Occupational and/or Speech Therapy. Times per day: Twice a day  Current Treatment Recommendations: Self-Care / ADL, Strengthening, ROM, Balance Training, Functional Mobility Training, Endurance Training, Neuromuscular Re-education, Cognitive Reorientation, Safety Education & Training, Patient/Caregiver Education & Training, Equipment Evaluation, Education, & procurement, Home Management Training, Cognitive/Perceptual Training  Patient Goals   Patient goals : return home safe and indep with self care  Short term goals  Time Frame for Short term goals: 1 week  Short term goal 1: pt to use BUE to cut food successfully and feed self with RUE mod indep. Short term goal 2: SBA ambulate to obtain set up for adls. Short term goal 3: SBA LE bathe and dress, able to don compression stockings with own device with min assist  Short term goal 4: ilana 9-11 min stand, ambulate with SBA and good safety for adls  Short term goal 5: ilana 10 reps x 3 R shld AROM achieving 95 degrees, ilana 10 reps x 3 R hand  strengthening. Long term goals  Time Frame for Long term goals : by discharge  Long term goal 1: mod indep ambulate to obtain set up for adls (4 [employer])  Long term goal 2: mod indep self care and toileting  Long term goal 3: tolerate 15-17 min stand, ambulate for adls mod indep with good safety. Long term goal 4: indep RUE HEP  Long term goal 5: increase R shoulder AROM to 100 and MMT to 3+ and R  to 20 pounds to improve use of RUE for adls.         09/22/21 1101 09/22/21 1505   OT Individual Minutes   Time In [id number]   Time Out 0836 1418   Minutes 61 33     Electronically signed by DAYANA Eagle on 9/22/21 at 3:07 PM EDT

## 2021-09-22 NOTE — PROGRESS NOTES
Physical Therapy  Facility/Department: [facility] ACUTE REHAB  Daily Treatment Note  NAME: Vanessa Huang  : 1960  MRN: [id number]    Date of Service: 2021    Discharge Recommendations:  Patient would benefit from continued therapy after discharge, Home with assist PRN   PT Equipment Recommendations  Equipment Needed: No  Other: Pt has rollator at home    Assessment   Body structures, Functions, Activity limitations: Decreased functional mobility ; Decreased strength;Decreased endurance;Decreased safe awareness;Decreased balance;Decreased cognition  Treatment Diagnosis: impaired mobility due to weakness and balance issues  Specific instructions for Next Treatment: Monitor sao2 with activity. instruct in strengthening and balance activities, instruct in standing balance and transfers/ gait  as LE strength improves  History: admitted due to acute encephalopathy  REQUIRES PT FOLLOW UP: Yes  Activity Tolerance  Activity Tolerance: Patient limited by fatigue;Patient limited by endurance; Other  Activity Tolerance: dizzy with greater exertion, frequent rest breaks needed     Patient Diagnosis(es): There were no encounter diagnoses. has a past medical history of Backache, unspecified, CHF (congestive heart failure) ([address] Chronic kidney disease, Coronary atherosclerosis of artery bypass graft, Cramp of limb, Gallstones, Hypertension, Insomnia, Pneumonia, Type II or unspecified type diabetes mellitus with renal manifestations, not stated as uncontrolled(250.40), Type II or unspecified type diabetes mellitus without mention of complication, not stated as uncontrolled, and Unspecified vitamin D deficiency. has a past surgical history that includes Coronary artery bypass graft; Knee arthroscopy; Carpal tunnel release; Breast surgery; Tonsillectomy; Hand surgery; Ankle fracture surgery;  Cholecystectomy, open (N/A); and IR TUNNELED CVC PLACE WO SQ PORT/PUMP > 5 YEARS (8/18/2021). Restrictions  Restrictions/Precautions  Restrictions/Precautions: Fall Risk  Required Braces or Orthoses?: Yes  Implants present? :  (R upper chest port)  Required Braces or Orthoses  Right Lower Extremity Brace:  (Lymphedema wraps)  Left Lower Extremity Brace:  (Lymphedema wraps)  Position Activity Restriction  Other position/activity restrictions: up w/ assist, Hemodialysis M / W / F  Subjective   General  Chart Reviewed: Yes  Additional Pertinent Hx: Per PM&R note: Yvette Spence is a 59 y.o. right-handed female with history of recent CVA, AURELIA on CKD (on hemodialysis), CAD, CHF, atrial fibrillation, HTN, type 2 diabetes admitted to [facility] on 9/13/2021. She initially presented following a syncopal episode in dialysis, after which she was reportedly confused. She has reported right lower limb weakness and heaviness. Imaging of the brain has been unremarkable. EEG showed mild encephalopathy with no epileptiform activity. Symptoms are thought to be secondary to hypoperfusion. Pulmonology has been consulted for new oxygen requirement overnight. She currently reports ongoing impaired memory and right-sided numbness/tingling and weakness. She thinks that symptoms are improving a little bit in the right upper limb. She also reports intermittent headache and dizziness. She notes shortness of breath with activity and decreased urine output as well. Pt admitted to rehab unit on 9/17/21  Response To Previous Treatment: Patient with no complaints from previous session. Family / Caregiver Present: No  Referring Practitioner: Dr. Bryn Avila: Patient continuing to be emotional over memory loss.  Emotional suppport given from writer   Pain Screening  Patient Currently in Pain: Denies  Vital Signs  Patient Currently in Pain: Denies       Orientation  Orientation  Overall Orientation Status: Within Functional Limits  Objective      Transfers  Sit to Stand: Stand by assistance  Stand to sit: Stand by assistance  Bed to Chair: Stand by assistance  Stand Pivot Transfers: Stand by assistance  Ambulation  Ambulation?: Yes  Ambulation 1  Surface: level tile  Device: Rollator  Assistance: Stand by assistance  Quality of Gait: increased fatigue and dizzyness reported with further distances, pt requires rest break before further activity  Gait Deviations: Slow Annie; Increased ELISABET; Decreased step length;Decreased step height  Distance: 80ft. 120ft AM; 100ft x2 PM  Comments: Pt reported that her RLE felt very heavy  Ambulation 2  Surface - 2: level tile  Device 2: No device (hand held assist )  Assistance 2: Minimal assistance  Quality of Gait 2: same as above  Gait Deviations: Decreased step length;Decreased step height;Decreased arm swing  Distance: 5ft  Stairs/Curb  Stairs?: Yes  Stairs  # Steps : 9  Stairs Height: 8\"  Rails: Bilateral  Device: No Device  Assistance: Stand by assistance  Comment: step-to pattern; seated rest break after ascending         Other exercises  Other exercises?: Yes  Other exercises 1: seated B LE exercises x20 reps with #2.5 and blue tband   Other exercises 2: seated UBE 5 minutes FWD, 5 min BWD  Other exercises 3: NuStep: L3, 15 minutes     Goals  Short term goals  Time Frame for Short term goals: 5 days  Short term goal 1: pt to tolerate 1/2 hour to 45 minutes for therapuetic exercise and activity  Short term goal 2: pt to demonstrate good technique for LE strengthening and  balance activities  Short term goal 3: pt to demonstrate  independent bed mobility with out bed rail. Short term goal 4: pt to demonstrate safe tech for transfers from varies surfaces, at supervsion level  Short term goal 5: pt able to ambulate with rollator distance fo 120 ft or more, SBA, level surfaces  Short term goal 6: pt able to go up and down 3 to 5 steps with 2 rails CGA to improve strength and endurance.   Short term goal 7: pt to demonstrate goobalance during gait with rollator. Long term goals  Time Frame for Long term goals : By DC  Long term goal 1: pt able to demonstrate mod-I transfers  Long term goal 2:  pt able to ambulate safely on level as well as carpetted/outside terraine with rollator, atleast distance of 150 ft, mod-I  Long term goal 3: pt able to go up and down 5 steps with2 rails , SBA to improve endurance. Long term goal 4: Improve gait speed by demonstrating ability to ambulate 150 ft with rollator in 2 minutes to improve fucntion. Long term goal 5: Improve R LE strength by 1/3 MMG to improve fucntion. Patient Goals   Patient goals : get better    Plan    Plan  Times per week: 900 minutes/week for combined therapy of PT/OT/ST due to decreased tolerance to activity and Hemodialysis. Specific instructions for Next Treatment: Monitor sao2 with activity. instruct in strengthening and balance activities, instruct in standing balance and transfers/ gait  as LE strength improves  Current Treatment Recommendations: Strengthening, Functional Mobility Training, Equipment Evaluation, Education, & procurement, Safety Education & Training, Gait Training, Transfer Training, Balance Training, Endurance Training, Positioning, Patient/Caregiver Education & Training, Stair training, Neuromuscular Re-education  Safety Devices  Type of devices:  All fall risk precautions in place, Gait belt, Patient at risk for falls        09/22/21 0943 09/22/21 1451   PT Individual Minutes   Time In 0942 1222   Time Out 1016 1300   Minutes 34 38   PT Concurrent Minutes   Time In 1016  --    Time Out 1022  --    Minutes 6  --      Electronically signed by Rain Garces PTA on 9/22/21 at 3:09 PM EDT

## 2021-09-22 NOTE — PROGRESS NOTES
Request for I practice. Plan:  [x] Continue ST services    [] Discharge from ST:      Discharge recommendations: [] Inpatient Rehab   [] [facility]   [] Outpatient Therapy  [] Follow up at trauma clinic   [] Other:       Treatment completed by: Ev Bowers A.CCC/SLP

## 2021-09-22 NOTE — PROGRESS NOTES
HEMODIALYSIS PRE-TREATMENT NOTE    Patient Identifiers prior to treatment: Name, , MRN    Isolation Required:  Yes                      Isolation Type: MRSA       (please document if patient is being managed as a PUI/COVID-19 patient)        Hepatitis status:                           Date Drawn                             Result  Hepatitis B Surface Antigen 21     Negative                     Hepatitis B Surface Antibody 21 Negative        Hepatitis B Core Antibody 21 Negative          How was Hepatitis Status verified: Epic     Was a copy of the labs you documented provided to facility for the patient's chart:     Hemodialysis orders verified: Yes    Access Within normal limits ( I.e. s/s of infection,...): CVC right chest wall WNL     Pre-Assessment completed: Yes    Pre-dialysis report received from: Delores Gomez RN                      Time: 8972

## 2021-09-22 NOTE — PROGRESS NOTES
Speech Language Pathology  Speech Language Pathology  [facility] Acute Rehab Unit at [address] [place] [address]  Cognitive/Speech/Language  Treatment Note    Date: 9/22/2021  Patients Name: Raffi Pfeiffer  MRN: [id number]  Diagnosis: Cognitive impairment   Patient Active Problem List   Diagnosis Code    Coronary artery disease I25.10    Chronic diastolic heart failure (HCC) I50.32    Diabetic polyneuropathy associated with type 2 diabetes mellitus (HCC) E11.42    History of coronary artery bypass graft Z95.1    Iron deficiency anemia D50.9    Spinal stenosis of lumbar region with neurogenic claudication M48.062    Mixed hyperlipidemia E78.2    Stage 4 chronic kidney disease (HCC) N18.4    Type 2 diabetes mellitus with kidney complication, with long-term current use of insulin ([facility]) E11.29, Z79.4    Syncope and collapse R55    Obesity, Class II, BMI 35-39.9 E66.9    Thyroid nodule greater than or equal to 1 cm in diameter incidentally noted on imaging study E04.1    Essential hypertension I10    Anemia in stage 4 chronic kidney disease (HCC) N18.4, D63.1    Chronic ischemic heart disease I25.9    Acute cerebrovascular accident (CVA) ([facility] [address] I63.9    Stroke-like symptoms R29.90    Morbid obesity with BMI of 40.0-44.9, adult ([facility]) E66.01, Z68.41    Acute encephalopathy G93.40    Encephalopathy G93.40    Debility R53.81       Pain: 0/10    Cognitive Treatment    Treatment time:  7381-7524    Subjective: [x] Alert [x] Cooperative     [] Confused     [] Agitated    [] Lethargic    Objective/Assessment:  Attention: functional throughout    Recall/Problem solving: Pt. Continues to use notebook to record information when learns of memory or concept she had forgotten. 5 step written sequencing- independent. Word deductions- 100%. written directions- 2 steps- 100%. Other:        Worksheets left for pt. At pt. Request for I practice.         Plan:  [x] Continue ST services    [] Discharge from ST:      Discharge recommendations: [] Inpatient Rehab   [] [place]   [] Outpatient Therapy  [] Follow up at trauma clinic   [] Other:       Treatment completed by: Seb Mccoy A.CCC/SLP

## 2021-09-22 NOTE — FLOWSHEET NOTE
09/22/21 1759   Vital Signs   /61   Temp 97.9 °F (36.6 °C)   Pulse 61   Resp 18   Weight 234 lb 12.6 oz (106.5 kg)   Weight Method Bed scale   Percent Weight Change -1.84   Post-Hemodialysis Assessment   Post-Treatment Procedures Blood returned;Catheter capped, clamped with Citrate x 2 ports   Machine Disinfection Process Acid/Vinegar Clean;Heat Disinfect   Rinseback Volume (ml) 500 ml   Total Liters Processed (l/min) 58.5 l/min   Dialyzer Clearance Moderately streaked   Duration of Treatment (minutes) 180 minutes   Hemodialysis Intake (ml) 500 ml   Hemodialysis Output (ml) 2500 ml   NET Removed (ml) 2000 ml   Tolerated Treatment Good   Patient Response to Treatment Patient tolerated treatment well without interruptions. 2000mL removed. Drsg dry and intact. Report given to Laith Anderson RN.

## 2021-09-23 LAB
GLUCOSE BLD-MCNC: 174 MG/DL (ref 65–105)
GLUCOSE BLD-MCNC: 181 MG/DL (ref 65–105)
GLUCOSE BLD-MCNC: 221 MG/DL (ref 65–105)
GLUCOSE BLD-MCNC: 309 MG/DL (ref 65–105)
GLUCOSE BLD-MCNC: 353 MG/DL (ref 65–105)

## 2021-09-23 PROCEDURE — 99231 SBSQ HOSP IP/OBS SF/LOW 25: CPT | Performed by: STUDENT IN AN ORGANIZED HEALTH CARE EDUCATION/TRAINING PROGRAM

## 2021-09-23 PROCEDURE — 6370000000 HC RX 637 (ALT 250 FOR IP): Performed by: FAMILY MEDICINE

## 2021-09-23 PROCEDURE — 97110 THERAPEUTIC EXERCISES: CPT

## 2021-09-23 PROCEDURE — 97129 THER IVNTJ 1ST 15 MIN: CPT

## 2021-09-23 PROCEDURE — 6370000000 HC RX 637 (ALT 250 FOR IP): Performed by: PHYSICAL MEDICINE & REHABILITATION

## 2021-09-23 PROCEDURE — 97116 GAIT TRAINING THERAPY: CPT

## 2021-09-23 PROCEDURE — 1180000000 HC REHAB R&B

## 2021-09-23 PROCEDURE — 97535 SELF CARE MNGMENT TRAINING: CPT

## 2021-09-23 PROCEDURE — 97530 THERAPEUTIC ACTIVITIES: CPT

## 2021-09-23 PROCEDURE — 82947 ASSAY GLUCOSE BLOOD QUANT: CPT

## 2021-09-23 PROCEDURE — 97130 THER IVNTJ EA ADDL 15 MIN: CPT

## 2021-09-23 RX ADMIN — CARVEDILOL 6.25 MG: 6.25 TABLET, FILM COATED ORAL at 21:56

## 2021-09-23 RX ADMIN — INSULIN GLARGINE 53 UNITS: 100 INJECTION, SOLUTION SUBCUTANEOUS at 07:25

## 2021-09-23 RX ADMIN — APIXABAN 5 MG: 5 TABLET, FILM COATED ORAL at 07:22

## 2021-09-23 RX ADMIN — PANTOPRAZOLE SODIUM 40 MG: 40 TABLET, DELAYED RELEASE ORAL at 05:55

## 2021-09-23 RX ADMIN — INSULIN LISPRO 4 UNITS: 100 INJECTION, SOLUTION INTRAVENOUS; SUBCUTANEOUS at 16:36

## 2021-09-23 RX ADMIN — FUROSEMIDE 80 MG: 40 TABLET ORAL at 16:36

## 2021-09-23 RX ADMIN — INSULIN LISPRO 10 UNITS: 100 INJECTION, SOLUTION INTRAVENOUS; SUBCUTANEOUS at 11:44

## 2021-09-23 RX ADMIN — ACETAMINOPHEN 650 MG: 325 TABLET, FILM COATED ORAL at 08:37

## 2021-09-23 RX ADMIN — APIXABAN 5 MG: 5 TABLET, FILM COATED ORAL at 21:56

## 2021-09-23 RX ADMIN — BUSPIRONE HYDROCHLORIDE 7.5 MG: 7.5 TABLET ORAL at 22:06

## 2021-09-23 RX ADMIN — TAMSULOSIN HYDROCHLORIDE 0.4 MG: 0.4 CAPSULE ORAL at 07:22

## 2021-09-23 RX ADMIN — SENNOSIDES 17.2 MG: 8.6 TABLET, COATED ORAL at 07:29

## 2021-09-23 RX ADMIN — RANOLAZINE 1000 MG: 1000 TABLET, FILM COATED, EXTENDED RELEASE ORAL at 22:06

## 2021-09-23 RX ADMIN — RANOLAZINE 1000 MG: 1000 TABLET, FILM COATED, EXTENDED RELEASE ORAL at 07:23

## 2021-09-23 RX ADMIN — INSULIN LISPRO 1 UNITS: 100 INJECTION, SOLUTION INTRAVENOUS; SUBCUTANEOUS at 21:56

## 2021-09-23 RX ADMIN — FEBUXOSTAT 40 MG: 40 TABLET, FILM COATED ORAL at 07:23

## 2021-09-23 RX ADMIN — POLYETHYLENE GLYCOL 3350 17 G: 17 POWDER, FOR SOLUTION ORAL at 07:23

## 2021-09-23 RX ADMIN — ASPIRIN 81 MG CHEWABLE TABLET 81 MG: 81 TABLET CHEWABLE at 07:22

## 2021-09-23 RX ADMIN — GABAPENTIN 300 MG: 300 CAPSULE ORAL at 07:22

## 2021-09-23 RX ADMIN — GABAPENTIN 300 MG: 300 CAPSULE ORAL at 21:55

## 2021-09-23 RX ADMIN — ATORVASTATIN CALCIUM 80 MG: 80 TABLET, FILM COATED ORAL at 07:22

## 2021-09-23 RX ADMIN — BUSPIRONE HYDROCHLORIDE 7.5 MG: 7.5 TABLET ORAL at 07:23

## 2021-09-23 RX ADMIN — BUSPIRONE HYDROCHLORIDE 7.5 MG: 7.5 TABLET ORAL at 14:30

## 2021-09-23 RX ADMIN — INSULIN LISPRO 2 UNITS: 100 INJECTION, SOLUTION INTRAVENOUS; SUBCUTANEOUS at 07:25

## 2021-09-23 RX ADMIN — TRAZODONE HYDROCHLORIDE 75 MG: 150 TABLET ORAL at 21:55

## 2021-09-23 RX ADMIN — CARVEDILOL 6.25 MG: 6.25 TABLET, FILM COATED ORAL at 07:22

## 2021-09-23 RX ADMIN — INSULIN GLARGINE 53 UNITS: 100 INJECTION, SOLUTION SUBCUTANEOUS at 21:56

## 2021-09-23 RX ADMIN — FUROSEMIDE 80 MG: 40 TABLET ORAL at 07:22

## 2021-09-23 ASSESSMENT — PAIN SCALES - GENERAL
PAINLEVEL_OUTOF10: 7
PAINLEVEL_OUTOF10: 7
PAINLEVEL_OUTOF10: 5
PAINLEVEL_OUTOF10: 7
PAINLEVEL_OUTOF10: 0

## 2021-09-23 ASSESSMENT — PAIN DESCRIPTION - LOCATION: LOCATION: NECK

## 2021-09-23 ASSESSMENT — PAIN DESCRIPTION - PAIN TYPE: TYPE: ACUTE PAIN

## 2021-09-23 NOTE — FLOWSHEET NOTE
Patient had just finished dinner and was tired tonight. She spoke about the challenge of being tied to dialysis and her hopes for a kidney transplant. She expects to be d/c Sunday and thanked writer for her visits. 09/23/21 8823   Encounter Summary   Services provided to: Patient   Referral/Consult From: [facility]   (9-23-21)   Complexity of Encounter Moderate   Length of Encounter 15 minutes   Spiritual Assessment Completed Yes   Spiritual/Tenriism   Type Spiritual support   Assessment Calm; Approachable   Intervention Active listening;Explored feelings, thoughts, concerns;Explored coping resources;Nurtured hope;Sustaining presence/ Ministry of presence; Discussed illness/injury and it's impact   Outcome Expressed gratitude;Engaged in conversation;Expressed feelings/needs/concerns;Receptive; Expressed regrets

## 2021-09-23 NOTE — PROGRESS NOTES
NEPHROLOGY PROGRESS NOTE    Patient :  Jonn Marsh; 59 y.o. MRN# [id number]  Location:  1550/6821-93  Attending:  Jaspal Torres Date:  9/17/2021   Hospital Day: 6      Reason for Consult: Acute kidney injury dialysis dependent      Chief Complaint: Confusion  History Obtained From: Patient    History of Present Illness: This is a 59 y.o. female with past medical history of longstanding type 2 diabetes insulin-dependent diabetes mellitus, essential hypertension, coronary artery disease status post CABG in 2004, coronary artery stent in 2019, CHF with EF of 55%, history of mild to moderate LVH diastolic dysfunction, recent history of acute/subacute stroke in left frontal lobe with right-sided weakness, patient developed acute kidney injury due to contrast-induced nephropathy requiring dialysis since August 2021  Patient currently receives dialysis Monday Wednesday Friday under Dr. Johnathon Cevallos at [facility]. Patient presented from dialysis with altered mental status. She apparently had transient loss of consciousness but unclear the duration and was confused. She has numbness over the right lower extremity and cannot feel the right side of her body which is new. Patient had MRI of the brain which showed no acute changes. MRA of the neck showed no hemodynamically significant stenosis identified. Patient transferred to acute rehab on 9/17/21.   Nephrologist consulted for continuation of dialysis    Subjective/interval history:    Patient seen and examined, no acute events overnight  Patient had dialysis yesterday tolerated well  Blood pressure stable        Past Medical History:        Diagnosis Date    Backache, unspecified     CHF (congestive heart failure) (HCC)     Chronic kidney disease     Coronary atherosclerosis of artery bypass graft     Cramp of limb     Gallstones     Hypertension     Insomnia     Pneumonia     Type II or unspecified type diabetes mellitus with renal manifestations, not stated as uncontrolled(250.40)     Type II or unspecified type diabetes mellitus without mention of complication, not stated as uncontrolled     Unspecified vitamin D deficiency        Past Surgical History:        Procedure Laterality Date    ANKLE FRACTURE SURGERY      BREAST SURGERY      CARPAL TUNNEL RELEASE      CHOLECYSTECTOMY, OPEN N/A     CORONARY ARTERY BYPASS GRAFT      x3    HAND SURGERY      IR TUNNELED CATHETER PLACEMENT GREATER THAN 5 YEARS  8/18/2021    IR TUNNELED CATHETER PLACEMENT GREATER THAN 5 YEARS 8/18/2021 [facility] SPECIAL PROCEDURES    KNEE ARTHROSCOPY      right    TONSILLECTOMY         Current Medications:    0.9 % sodium chloride infusion, PRN  ondansetron (ZOFRAN-ODT) disintegrating tablet 4 mg, Q8H PRN  sodium chloride flush 0.9 % injection 5-40 mL, PRN  potassium chloride (KLOR-CON M) extended release tablet 40 mEq, PRN   Or  potassium bicarb-citric acid (EFFER-K) effervescent tablet 40 mEq, PRN   Or  potassium chloride 10 mEq/100 mL IVPB (Peripheral Line), PRN  apixaban (ELIQUIS) tablet 5 mg, BID  aspirin chewable tablet 81 mg, Daily  atorvastatin (LIPITOR) tablet 80 mg, Daily  busPIRone (BUSPAR) tablet 7.5 mg, TID  carvedilol (COREG) tablet 6.25 mg, BID  dextrose 5 % solution, PRN  dextrose 50 % IV solution, PRN  febuxostat (ULORIC) tablet 40 mg, Daily  furosemide (LASIX) tablet 80 mg, BID  gabapentin (NEURONTIN) capsule 300 mg, BID  glucagon (rDNA) injection 1 mg, PRN  glucose (GLUTOSE) 40 % oral gel 15 g, PRN  insulin glargine (LANTUS) injection vial 53 Units, BID  insulin lispro (HUMALOG) injection vial 0-12 Units, TID WC  insulin lispro (HUMALOG) injection vial 0-6 Units, Nightly  pantoprazole (PROTONIX) tablet 40 mg, QAM AC  perflutren lipid microspheres (DEFINITY) injection 2.2 mg, ONCE PRN  polyethylene glycol (GLYCOLAX) packet 17 g, Daily PRN  ranolazine (RANEXA) extended release tablet 1,000 mg, BID  sodium citrate 4 % injection 1.9 mL, PRN  sodium 24HRS:  Temp (24hrs), Av.9 °F (36.6 °C), Min:97.9 °F (36.6 °C), Max:97.9 °F (36.6 °C)    CURRENT RESPIRATORY RATE:  Resp: 18  CURRENT PULSE:  Pulse: 56  CURRENT BLOOD PRESSURE:  BP: (!) 123/44  24HR BLOOD PRESSURE RANGE:  Systolic (57REW), GTL:223 , Min:115 , DJB:486   ; Diastolic (72HCY), QZQ:66, Min:44, Max:61    24HR INTAKE/OUTPUT:      Intake/Output Summary (Last 24 hours) at 2021 1645  Last data filed at 2021 1759  Gross per 24 hour   Intake 500 ml   Output 2500 ml   Net -2000 ml     Patient Vitals for the past 96 hrs (Last 3 readings):   Weight   21 1759 234 lb 12.6 oz (106.5 kg)   21 1338 239 lb 3.2 oz (108.5 kg)   21 1800 239 lb 3.2 oz (108.5 kg)       Physical Exam:  GENERAL APPEARANCE: Alert and cooperative, and appears to be in no acute distress. HEAD: normocephalic  THROAT:  Oral cavity and pharynx normal. Moist  CARDIAC: Normal S1 and S2. No S3, S4 or murmurs. Rhythm is regular. LUNGS: Clear to auscultation and percussion without rales, rhonchi, wheezing or diminished breath sounds. NECK: Neck supple, non-tender without lymphadenopathy, masses or thyromegaly. JVD-neg  BACK: Examination of the spine reveals normal gait and posture, no spinal deformity, symmetry of spinal muscles, without tenderness, decreased range of motion or muscular spasm  MUSKULOSKELETAL: Adequately aligned spine. No joint erythema or tenderness. EXTREMITIES: No edema. Peripheral pulses intact. NEURO: Right-sided weakness and numbness    Labs:      Radiology:  Reviewed as available. Assessment:  1. AURELIA secondary to ATN, dialysis dependent on hemodialysis   2. Essential hypertension  3. History of CVA  4. Type 2 diabetes insulin-dependent diabetes mellitus       Plan:  1. Hemodialysis , HD tomorrow  2. [facility]       Thank you for the consultation.       Electronically signed by Daniel De Oliveira MD on 2021 at 4:45 PM

## 2021-09-23 NOTE — PLAN OF CARE
Problem: Falls - Risk of:  Goal: Will remain free from falls  Description: Will remain free from falls  9/23/2021 0337 by Deborah Pedraza RN  Outcome: Ongoing     Problem: Falls - Risk of:  Goal: Absence of physical injury  Description: Absence of physical injury  9/23/2021 3789 by Deborah Pedraza RN  Outcome: Ongoing     Problem: Skin Integrity:  Goal: Will show no infection signs and symptoms  Description: Will show no infection signs and symptoms  9/23/2021 0337 by Deborah Pedraza RN  Outcome: Ongoing     Problem: Skin Integrity:  Goal: Absence of new skin breakdown  Description: Absence of new skin breakdown  9/23/2021 0337 by Deborah Pedraza RN  Outcome: Ongoing     Problem: Skin Integrity:  Goal: Absence of new skin breakdown  Description: Absence of new skin breakdown  9/23/2021 0337 by Deborah Pedraza RN  Outcome: Ongoing     Problem: SAFETY  Goal: LTG - patient will utilize safety techniques  9/23/2021 0337 by Deborah Pedraza RN  Outcome: Ongoing     Problem: SAFETY  Goal: STG - patient locks brakes on wheelchair  9/23/2021 0337 by Deborah Pedraza RN  Outcome: Ongoing     Problem: Musculor/Skeletal Functional Status  Goal: Absence of falls  9/23/2021 0337 by Deborah Pedraza RN  Outcome: Ongoing     Problem: Pain:  Goal: Pain level will decrease  Description: Pain level will decrease  9/23/2021 0337 by Deborah Pedraza RN  Outcome: Ongoing     Problem: Pain:  Goal: Control of acute pain  Description: Control of acute pain  9/23/2021 0337 by Deborah Pedraza RN  Outcome: Ongoing     Problem: Pain:  Goal: Control of chronic pain  Description: Control of chronic pain  9/23/2021 0337 by Deborah Pedraza RN  Outcome: Ongoing     Problem: Nutrition  Goal: Optimal nutrition therapy  9/23/2021 0337 by Deborah Pedraza RN  Outcome: Ongoing     Problem: Musculor/Skeletal Functional Status  Goal: Highest potential functional level  9/23/2021 0337 by Deborah Pedraaz RN  Outcome: Ongoing     Problem: Musculor/Skeletal Functional Status  Goal: Absence of falls  9/23/2021 0337 by Renzo Mcguire RN  Outcome: Ongoing

## 2021-09-23 NOTE — PROGRESS NOTES
Speech Language Pathology  Speech Language Pathology  [facility] Acute Rehab Unit at [facility]  Cognitive/Speech/Language  Treatment Note    Date: 9/23/2021  Patients Name: Dipesh Johnston  MRN: [id number]  Diagnosis: Cognitive impairment   Patient Active Problem List   Diagnosis Code    Coronary artery disease I25.10    Chronic diastolic heart failure (HCC) I50.32    Diabetic polyneuropathy associated with type 2 diabetes mellitus (HCC) E11.42    History of coronary artery bypass graft Z95.1    Iron deficiency anemia D50.9    Spinal stenosis of lumbar region with neurogenic claudication M48.062    Mixed hyperlipidemia E78.2    Stage 4 chronic kidney disease (HCC) N18.4    Type 2 diabetes mellitus with kidney complication, with long-term current use of insulin ([facility]) E11.29, Z79.4    Syncope and collapse R55    Obesity, Class II, BMI 35-39.9 E66.9    Thyroid nodule greater than or equal to 1 cm in diameter incidentally noted on imaging study E04.1    Essential hypertension I10    Anemia in stage 4 chronic kidney disease (HCC) N18.4, D63.1    Chronic ischemic heart disease I25.9    Acute cerebrovascular accident (CVA) ([facility] [address] I63.9    Stroke-like symptoms R29.90    Morbid obesity with BMI of 40.0-44.9, adult ([facility]) E66.01, Z68.41    Acute encephalopathy G93.40    Encephalopathy G93.40    Debility R53.81       Pain: 0/10    Cognitive Treatment    Treatment time:  3811-0910    Subjective: [x] Alert [x] Cooperative     [] Confused     [] Agitated    [] Lethargic    Objective/Assessment:  Attention: functional throughout    Recall/Problem solving: Pt. Continues to use notebook to record information when learns of memory or concept she had forgotten. Pt. Recalling name of previous SLP and tasks given by SLP to complete independently. Categorization grid (ID general category, subcategory, and specific member)- 50% accuracy anthony, 100% cued.   Convergent categorization, fill in missing letters- 100% accuracy anthony.      Pt. reports is active on social media and recently learned that niece will be having a baby from a Facebook post.  Discussed use of social media (Facebook) as form of external memory aide for recall of friends/family and names. Pt. verbalized understanding and reports when nieces came to visit hospital, was able to recall faces from [employer] (matching face to name/facebook profile). Other: No family present. Pt. using circumlocution independently when having word finding difficulty in conversation and during tasks. Reinforced this. Plan:  [x] Continue ST services    [] Discharge from ST:      Discharge recommendations: [] Inpatient Rehab   [] [place]   [] Outpatient Therapy  [] Follow up at trauma clinic   [] Other:       Treatment completed by:  Becky Turner M.A., CCC-SLP

## 2021-09-23 NOTE — PROGRESS NOTES
Speech Language Pathology  Speech Language Pathology  [facility] Acute Rehab Unit at [facility]  Cognitive/Speech/Language  Treatment Note    Date: 9/23/2021  Patients Name: Altagracia Palafox  MRN: [id number]  Diagnosis: Cognitive impairment   Patient Active Problem List   Diagnosis Code    Coronary artery disease I25.10    Chronic diastolic heart failure (HCC) I50.32    Diabetic polyneuropathy associated with type 2 diabetes mellitus (HCC) E11.42    History of coronary artery bypass graft Z95.1    Iron deficiency anemia D50.9    Spinal stenosis of lumbar region with neurogenic claudication M48.062    Mixed hyperlipidemia E78.2    Stage 4 chronic kidney disease (HCC) N18.4    Type 2 diabetes mellitus with kidney complication, with long-term current use of insulin (HCC) E11.29, Z79.4    Syncope and collapse R55    Obesity, Class II, BMI 35-39.9 E66.9    Thyroid nodule greater than or equal to 1 cm in diameter incidentally noted on imaging study E04.1    Essential hypertension I10    Anemia in stage 4 chronic kidney disease (HCC) N18.4, D63.1    Chronic ischemic heart disease I25.9    Acute cerebrovascular accident (CVA) ([facility] [address] I63.9    Stroke-like symptoms R29.90    Morbid obesity with BMI of 40.0-44.9, adult ([facility]) E66.01, Z68.41    Acute encephalopathy G93.40    Encephalopathy G93.40    Debility R53.81       Pain: 0/10    Cognitive Treatment    Treatment time:  [id number]  *shortened session d/t Pt. Returning late from PT. Subjective: [x] Alert [x] Cooperative     [] Confused     [] Agitated    [] Lethargic    Objective/Assessment:  Attention: functional throughout    Recall/Problem solving: Pt. Continues to use notebook to record information when learns of memory or concept she had forgotten. Convergent categorization, fill in missing letters- 50% accuracy anthony, 100% cued. Opposites- 91% accuracy anthony, 100% cued.      Following written directions:   1-step: 100% accuracy independently x2   2-step: 80% accuracy anthony, 100% cued    Reviewed strategies (e.g., decreasing rate, rereading direction to ensure understanding, reading direction in its entirety prior to attempting to complete) to assist c completion of direction tasks in future (e.g., reading signs, filling out doctor appt intake forms, following directions on prescription bottles or tax forms). Pt verbalized understanding and agreeable. Other: No family present. Per PTA, Pt. blood pressure dropped during PT session and feeling lightheaded. Pt. agreeable to working with ST this PM.        Plan:  [x] Continue ST services    [] Discharge from ST:      Discharge recommendations: [] Inpatient Rehab   [] [place]   [] Outpatient Therapy  [] Follow up at trauma clinic   [] Other:       Treatment completed by:  Phyllis Yun M.A., CCC-SLP

## 2021-09-23 NOTE — PROGRESS NOTES
reports ongoing impaired memory and right-sided numbness/tingling and weakness. She thinks that symptoms are improving a little bit in the right upper limb. She also reports intermittent headache and dizziness. She notes shortness of breath with activity and decreased urine output as well. Pt admitted to rehab unit on 9/17/21    Overall Orientation Status: Within Functional Limits  Restrictions/Precautions  Restrictions/Precautions: Fall Risk  Required Braces or Orthoses?: Yes  Implants present? :  (R upper chest port)  Required Braces or Orthoses  Right Lower Extremity Brace:  (Lymphedema wraps)  Left Lower Extremity Brace:  (Lymphedema wraps)  Position Activity Restriction  Other position/activity restrictions: up w/ assist, Hemodialysis M / W / F    Subjective: Patient continuing to be emotional over memory loss. Emotional suppport given from writer   Comments: Patient reporting increased fatigue and \"not well today since dialysis. \"  Patient reporting dizziness at times and needed to sit down. BP low after episode and RN informed. Vital Signs  Pulse: 62  Heart Rate Source: Monitor  BP: (!) 107/48 (Nursing informed. Patient symptomatic with dizziness.)  BP Location: Left upper arm  Patient Currently in Pain: Denies        Oxygen Therapy  SpO2: 98 %  O2 Device: None (Room air)          Bed Mobility:   Rolling: Supervision  Supine to Sit: Supervision  Sit to Supine: Unable to assess  Scooting: Supervision      Transfers:  Sit to Stand: Stand by assistance  Stand to sit: Stand by assistance  Bed to Chair: Stand by assistance              Ambulation 1  Surface: level tile  Device: Rollator  Assistance: Stand by assistance  Quality of Gait: increased fatigue and dizzyness reported with further distances, pt requires rest break before further activity  Gait Deviations: Slow Annie; Increased ELISABET; Decreased step length;Decreased step height  Distance: 50 ft x 2;  short distances within gym (Increased fatigue limited distance today.)         Ambulation 2  Surface - 2: level tile  Device 2: No device (hand held assist )  Assistance 2: Minimal assistance  Quality of Gait 2: same as above  Gait Deviations: Decreased step length;Decreased step height;Decreased arm swing  Distance: 20 ft to NuStep  Comments: Heavy lean onto R hand. Stairs/Curb  Stairs?: Yes  Stairs  # Steps : 10  Stairs Height: 6\"  Rails: Bilateral  Device: No Device  Assistance: Contact guard assistance  Comment: Patient became dizzy on stairs and required to sit ASAP. CGA to assist for safety. BP taken and was 107/48 L UE. Nursing informed. BALANCE Posture: Fair  Sitting - Static: Good  Sitting - Dynamic: Fair;+  Standing - Static: Good  Standing - Dynamic: Fair;+  Comments: Standing balance completed with rollator. EXERCISES    Other exercises?: Yes  Other exercises 1: seated B LE exercises x20 reps with #2.5 and blue tband   Other exercises 2: seated UBE 5 minutes FWD, 5 min BWD  Other exercises 3: NuStep: L3, 15 minutes  Other Activities  Comment: Breaks given PRN        Activity Tolerance: Patient limited by fatigue, Patient limited by endurance, Other  Activity Tolerance: dizzy with greater exertion, frequent rest breaks needed  PT Equipment Recommendations  Equipment Needed: No  Other: Pt has rollator at home         Current Treatment Recommendations: Strengthening, Functional Mobility Training, Equipment Evaluation, Education, & procurement, Safety Education & Training, Gait Training, Transfer Training, Balance Training, Endurance Training, Positioning, Patient/Caregiver Education & Training, Stair training, Neuromuscular Re-education    Conditions Requiring Skilled Therapeutic Intervention  Body structures, Functions, Activity limitations: Decreased functional mobility ; Decreased strength;Decreased endurance;Decreased safe awareness;Decreased balance;Decreased cognition  Treatment Diagnosis: impaired mobility due to weakness and balance issues  History: admitted due to acute encephalopathy  REQUIRES PT FOLLOW UP: Yes  Discharge Recommendations: Patient would benefit from continued therapy after discharge;Home with assist PRN    Goals  Short term goals  Time Frame for Short term goals: 5 days  Short term goal 1: pt to tolerate 1/2 hour to 45 minutes for therapuetic exercise and activity  Short term goal 2: pt to demonstrate good technique for LE strengthening and  balance activities  Short term goal 3: pt to demonstrate  independent bed mobility with out bed rail. Short term goal 4: pt to demonstrate safe tech for transfers from varies surfaces, at supervsion level  Short term goal 5: pt able to ambulate with rollator distance fo 120 ft or more, SBA, level surfaces  Short term goal 6: pt able to go up and down 3 to 5 steps with 2 rails CGA to improve strength and endurance. Short term goal 7: pt to demonstrate goobalance during gait with rollator. Long term goals  Time Frame for Long term goals : By DC  Long term goal 1: pt able to demonstrate mod-I transfers  Long term goal 2:  pt able to ambulate safely on level as well as carpetted/outside terraine with rollator, atleast distance of 150 ft, mod-I  Long term goal 3: pt able to go up and down 5 steps with2 rails , SBA to improve endurance. Long term goal 4: Improve gait speed by demonstrating ability to ambulate 150 ft with rollator in 2 minutes to improve fucntion. Long term goal 5: Improve R LE strength by 1/3 MMG to improve fucntion.         09/23/21 0945 09/23/21 1224   PT Individual Minutes   Time In 1010 1224   Time Out 1020 1310   Minutes 10 46   PT Concurrent Minutes   Time In 0945  --    Time Out 1010  --    Minutes 25  --        Electronically signed by Maria E Williamson PTA on 9/23/21 at 5:25 PM EDT

## 2021-09-23 NOTE — PROGRESS NOTES
Physical Medicine & Rehabilitation  Progress Note      Subjective:      Altagracia Palafox is a 59 y.o. female with encephalopathy, recrudescence of right-sided stroke symptoms. She reports doing fine today. She feels like her right upper and lower limbs are getting stronger. She notes that sensory deficit on the right and memory impairment are about the same overall. She denies any other acute concerns. Discussed discharge Sunday. ROS:  Denies fevers, chills, sweats. No chest pain, palpitations, lightheadedness. Denies coughing, wheezing or shortness of breath. Denies abdominal pain, nausea, diarrhea or constipation. No new areas of joint pain. Denies new areas of numbness or weakness. Denies new anxiety or depression issues. No new skin problems. Rehabilitation:   Progressing in therapies. PT:  Restrictions/Precautions: Fall Risk  Implants present? :  (R upper chest port)  Other position/activity restrictions: up w/ assist, Hemodialysis M / W / F  Required Braces or Orthoses  Right Lower Extremity Brace:  (Lymphedema wraps)  Left Lower Extremity Brace:  (Lymphedema wraps)   Transfers  Sit to Stand: Stand by assistance  Stand to sit: Stand by assistance  Bed to Chair: Stand by assistance  Stand Pivot Transfers: Stand by assistance  Comment: Transfers completed with B UE support on rollator.   Ambulation 1  Surface: level tile  Device: Rollator  Assistance: Stand by assistance  Quality of Gait: increased fatigue and dizzyness reported with further distances, pt requires rest break before further activity  Gait Deviations: Slow Annie, Increased ELISABET, Decreased step length, Decreased step height  Distance: 50 ft x 2;  short distances within gym (Increased fatigue limited distance today.)  Comments: Pt reported that her RLE felt very heavy    Transfers  Sit to Stand: Stand by assistance  Stand to sit: Stand by assistance  Bed to Chair: Stand by assistance  Stand Pivot Transfers: Stand by assistance  Comment: Transfers completed with B UE support on rollator. Ambulation  Ambulation?: Yes  More Ambulation?: No  Ambulation 1  Surface: level tile  Device: Rollator  Assistance: Stand by assistance  Quality of Gait: increased fatigue and dizzyness reported with further distances, pt requires rest break before further activity  Gait Deviations: Slow Annie, Increased ELISABET, Decreased step length, Decreased step height  Distance: 50 ft x 2;  short distances within gym (Increased fatigue limited distance today.)  Comments: Pt reported that her RLE felt very heavy    Surface: level tile  Ambulation 1  Surface: level tile  Device: Rollator  Assistance: Stand by assistance  Quality of Gait: increased fatigue and dizzyness reported with further distances, pt requires rest break before further activity  Gait Deviations: Slow Annie, Increased ELISABET, Decreased step length, Decreased step height  Distance: 50 ft x 2;  short distances within gym (Increased fatigue limited distance today.)  Comments: Pt reported that her RLE felt very heavy    OT:  ADL  Equipment Provided: Reacher, Sock aid  Feeding: Modified independent  (per pt report. )  Grooming: Supervision (seated on 4WW sinkside. )  UE Bathing: Setup, Supervision (seated on 4WW sinkside. )  LE Bathing: None  UE Dressing: Supervision, Setup  LE Dressing: Stand by assistance, Minimal assistance (Assist with donning B TEDs)  Toileting: Stand by assistance  Additional Comments: RADHA facilitated pt in full shower this AM seated on tub bench. Pt completes with no noted memory/sequencing deficits. Pt requests assist with washing buttocks while standing at grab bar. After shower, demos with increased fatigue req CGA to turn and sit on 4WW at sink for grooming tasks.     Instrumental ADL's  Instrumental ADLs: Yes     Balance  Sitting Balance: Independent  Standing Balance: Stand by assistance   Standing Balance  Time: Am: 1-2 min x4  Activity: AM: ADL set up, care tasks, functional mobility in room   Comment: limited standing tolerance due to quick onset of fatigue/dizziness/leg weakness reported upon standing at closet, quick seated rest on 4WW seat. Functional Mobility  Functional - Mobility Device: 4-Wheeled Walker  Activity: [employer], Transport items, To/from bathroom  Assist Level: Stand by assistance  Functional Mobility Comments: SBA-CGA. good safety with device     Bed mobility  Rolling to Left: Stand by assistance  Rolling to Right: Stand by assistance  Supine to Sit: Stand by assistance  Sit to Supine: Stand by assistance  Scooting: Supervision  Comment: Unable to assess as patient was in chair upon arrival.  Transfers  Stand Step Transfers: Contact guard assistance (w/4WW)  Stand Pivot Transfers: Contact guard assistance  Sit to stand: Stand by assistance  Stand to sit: Contact guard assistance, Stand by assistance (CGA with quick sit for onset dizziness/pain. )  Transfer Comments: 2LR   Toilet Transfers  Toilet - Technique: Ambulating  Equipment Used: Grab bars  Toilet Transfer: Stand by assistance  Toilet Transfers Comments: with 8WB     [id number]  HQVTRI - Transfer From: Walker  Shower - Transfer Type: To and From  Shower - Transfer To: Shower seat with back  Shower - Technique: Ambulating  Shower Transfers: Contact Guard, Minimal assistance  Shower Transfers Comments: grab bar for supported prn. SPEECH:  Subjective: [x]? Alert     [x]? Cooperative     []? Confused     []? Agitated    []? Lethargic     Objective/Assessment:  Attention: functional throughout     Recall/Problem solving: Pt. Continues to use notebook to record information when learns of memory or concept she had forgotten. Pt. Recalling name of previous SLP and tasks given by SLP to complete independently. Categorization grid (ID general category, subcategory, and specific member)- 50% accuracy anthony, 100% cued. Convergent categorization, fill in missing letters- 100% accuracy anthony.    Pt. reports is active on social media and recently learned that niece will be having a baby from a Facebook post.  Discussed use of social media (Facebook) as form of external memory aide for recall of friends/family and names. Pt. verbalized understanding and reports when nieces came to visit hospital, was able to recall faces from [employer] (matching face to name/facebook profile).       Other: No family present. Pt. using circumlocution independently when having word finding difficulty in conversation and during tasks. Reinforced this. Subjective: [x]? Alert     [x]? Cooperative     []? Confused     []? Agitated    []? Lethargic     Objective/Assessment:  Attention: functional throughout     Recall/Problem solving: Pt. Continues to use notebook to record information when learns of memory or concept she had forgotten. Convergent categorization, fill in missing letters- 50% accuracy anthony, 100% cued. Opposites- 91% accuracy anthony, 100% cued. Following written directions:              1-step: 100% accuracy independently x2              2-step: 80% accuracy anthony, 100% cued     Reviewed strategies (e.g., decreasing rate, rereading direction to ensure understanding, reading direction in its entirety prior to attempting to complete) to assist c completion of direction tasks in future (e.g., reading signs, filling out doctor appt intake forms, following directions on prescription bottles or tax forms).  Pt verbalized understanding and agreeable.      Other: No family present. Per PTA, Pt. blood pressure dropped during PT session and feeling lightheaded.   Pt. agreeable to working with ST this PM.     Current Medications:   Current Facility-Administered Medications: 0.9 % sodium chloride infusion, 25 mL, IntraVENous, PRN  ondansetron (ZOFRAN-ODT) disintegrating tablet 4 mg, 4 mg, Oral, Q8H PRN **OR** [DISCONTINUED] ondansetron (ZOFRAN) injection 4 mg, 4 mg, IntraVENous, Q6H PRN  sodium chloride flush 0.9 % injection 5-40 mL, 5-40 mL, IntraVENous, PRN  potassium chloride (KLOR-CON M) extended release tablet 40 mEq, 40 mEq, Oral, PRN **OR** potassium bicarb-citric acid (EFFER-K) effervescent tablet 40 mEq, 40 mEq, Oral, PRN **OR** potassium chloride 10 mEq/100 mL IVPB (Peripheral Line), 10 mEq, IntraVENous, PRN  apixaban (ELIQUIS) tablet 5 mg, 5 mg, Oral, BID  aspirin chewable tablet 81 mg, 81 mg, Oral, Daily  atorvastatin (LIPITOR) tablet 80 mg, 80 mg, Oral, Daily  busPIRone (BUSPAR) tablet 7.5 mg, 7.5 mg, Oral, TID  carvedilol (COREG) tablet 6.25 mg, 6.25 mg, Oral, BID  dextrose 5 % solution, 100 mL/hr, IntraVENous, PRN  dextrose 50 % IV solution, 12.5 g, IntraVENous, PRN  febuxostat (ULORIC) tablet 40 mg, 40 mg, Oral, Daily  furosemide (LASIX) tablet 80 mg, 80 mg, Oral, BID  gabapentin (NEURONTIN) capsule 300 mg, 300 mg, Oral, BID  glucagon (rDNA) injection 1 mg, 1 mg, IntraMUSCular, PRN  glucose (GLUTOSE) 40 % oral gel 15 g, 15 g, Oral, PRN  insulin glargine (LANTUS) injection vial 53 Units, 53 Units, SubCUTAneous, BID  insulin lispro (HUMALOG) injection vial 0-12 Units, 0-12 Units, SubCUTAneous, TID WC  insulin lispro (HUMALOG) injection vial 0-6 Units, 0-6 Units, SubCUTAneous, Nightly  pantoprazole (PROTONIX) tablet 40 mg, 40 mg, Oral, QAM AC  perflutren lipid microspheres (DEFINITY) injection 2.2 mg, 2 mL, IntraVENous, ONCE PRN  polyethylene glycol (GLYCOLAX) packet 17 g, 17 g, Oral, Daily PRN  ranolazine (RANEXA) extended release tablet 1,000 mg, 1,000 mg, Oral, BID  sodium citrate 4 % injection 1.9 mL, 1.9 mL, IntraCATHeter, PRN  sodium citrate 4 % injection 1.9 mL, 1.9 mL, IntraCATHeter, PRN  tamsulosin (FLOMAX) capsule 0.4 mg, 0.4 mg, Oral, Daily  traZODone (DESYREL) tablet 75 mg, 75 mg, Oral, Nightly  acetaminophen (TYLENOL) tablet 650 mg, 650 mg, Oral, Q4H PRN  polyethylene glycol (GLYCOLAX) packet 17 g, 17 g, Oral, Daily  senna (SENOKOT) tablet 17.2 mg, 2 tablet, Oral, Daily PRN  bisacodyl (DULCOLAX) suppository 10 mg, 10 mg, Rectal, Daily PRN      Objective:  BP (!) 177/66   Pulse 59   Temp 97.1 °F (36.2 °C) (Oral)   Resp 18   Ht 5' 5\" (1.651 m)   Wt 234 lb 12.6 oz (106.5 kg)   SpO2 100%   BMI 39.07 kg/m²       GEN: Well developed, well nourished, no acute distress  HEENT: NCAT. EOM grossly intact. Hearing grossly intact. Mucous membranes pink and moist.  RESP: Normal breath sounds with no wheezing, rales, or rhonchi. Respirations WNL and unlabored. CV: Regular rate and rhythm. No murmurs, rubs, or gallops. ABD: Soft, non-distended, BS+ and equal.  NEURO: Alert. Speech fluent. Impaired memory. Sensation to light touch decreased in right lower limb compared to left. MSK:  Muscle bulk is normal bilaterally. Strength 4+/5 in right upper limb and 5/5 in left upper limb. Strength 5/5 with bilateral ankle dorsiflexion and plantarflexion. LIMBS: Edema present in bilateral lower limbs - stable. SKIN: Warm and dry with good turgor. PSYCH: Mood WNL. Affect WNL. Appropriately interactive. Diagnostics:     CBC:   Recent Labs     09/22/21  0700   WBC 4.9   RBC 3.46*   HGB 11.2*   HCT 33.2*   MCV 95.9   RDW 14.2   *     BMP:   Recent Labs     09/22/21  0700   *   K 4.5   CL 95*   CO2 25   BUN 32*   CREATININE 3.08*   GLUCOSE 156*     BNP: No results for input(s): BNP in the last 72 hours. PT/INR: No results for input(s): PROTIME, INR in the last 72 hours. APTT: No results for input(s): APTT in the last 72 hours. CARDIAC ENZYMES: No results for input(s): CKMB, CKMBINDEX, TROPONINT in the last 72 hours. Invalid input(s): CKTOTAL;3  FASTING LIPID PANEL:  Lab Results   Component Value Date    CHOL 105 04/09/2015    HDL 43 04/09/2015    TRIG 168 04/09/2015     LIVER PROFILE: No results for input(s): AST, ALT, ALB, BILIDIR, BILITOT, ALKPHOS in the last 72 hours. Impression/Plan:   Impaired ADLs, gait, and mobility due to:    1.  Encephalopathy:  Thought to be secondary to hypoperfusion. SLP for cognition/memory. 2. Recrudescence of right-sided stroke symptoms: In the setting of recent CVA. PT/OT  for gait, mobility, strengthening, endurance, ADLs, and self care. On aspirin, atorvastatin. 3. Anemia:  Hemoglobin 11.2 on 9/22, stable. Monitoring. On iron supplementation - discontinued 9/21, as patient is refusing due to constipation with iron supplement. 4. Thrombocytopenia:  Platelets 029 on 7/09, stable. Monitoring. 5. AURELIA on CKD: On hemodialysis MWF. Nephrology following. 6. CAD:  On aspirin, atorvastatin, ranexa  7. Atrial fibrillation:  On eliquis, carvedilol  8. HTN:  On carvedilol, furosemide  9. Type 2 diabetes with neuropathy:  On lantus, humalog sliding scale. On gabapentin  10. Anxiety: On buspirone TID  11. Insomnia:  On trazodone nightly  12. Gout:  On febuxostat  13. Obesity  14. Bowel Management: Miralax daily, senokot prn, dulcolax prn. 15. DVT prophylaxis:  On eliquis  16.  Internal Medicine for medical management      Electronically signed by Patricia Madrid MD on 9/23/2021 at 11:57 PM

## 2021-09-23 NOTE — PROGRESS NOTES
[address]   ACUTE REHABILITATION OCCUPATIONAL THERAPY  DAILY NOTE    Date: 21  Patient Name: Jorge Renae      Room: 9388/1255-40    MRN: [id number]   : 1960  (59 y.o.)  Gender: female   Referring Practitioner: Allyssa Carlisle MD  Diagnosis: Acute encephalopathy, thought to be secondary to hypoperfusion Recrudescence of right-sided stroke symptoms, admit post syncope event during dialysis with AURELIA on CKI. Additional Pertinent Hx: d/c from  acute rehab here on 21 post treat for CVA with R weak. BLE lymphedema -pt notes per GP not to use pumps from home currently. pt to bring in comp garments and brandon. Per PM&R note: Jorge Renae is a 59 y.o. right-handed female with history of recent CVA, AURELIA on CKD (on hemodialysis), CAD, CHF, atrial fibrillation, HTN, type 2 diabetes admitted to [place] on 2021. She initially presented following a syncopal episode in dialysis, after which she was reportedly confused. She has reported right lower limb weakness and heaviness. Imaging of the brain has been unremarkable. EEG showed mild encephalopathy with no epileptiform activity. Symptoms are thought to be secondary to hypoperfusion. Pulmonology has been consulted for new oxygen requirement overnight. She currently reports ongoing impaired memory and right-sided numbness/tingling and weakness. Restrictions  Restrictions/Precautions: Fall Risk  Implants present? :  (R upper chest port)  Other position/activity restrictions: up w/ assist, Hemodialysis M / W / F  Required Braces or Orthoses  Right Lower Extremity Brace:  (Lymphedema wraps)  Left Lower Extremity Brace:  (Lymphedema wraps)  Required Braces or Orthoses?: Yes    Subjective  Comments: Pt pleasant and cooperative. Pt requires increased rest breaks during therapy today.    Patient Currently in Pain: Yes  Pain Level: 7  Pain Location: Neck  Restrictions/Precautions: Fall Risk  Overall Orientation Status: Within Functional Limits     Pain Assessment  Pain Assessment: 0-10  Pain Level: 7  Pain Type: Acute pain  Pain Location: Neck    Objective  Cognition  Overall Cognitive Status: Impaired  Attention Span: Attends with cues to redirect  Memory: Decreased long term memory;Decreased short term memory  Following Commands: Follows one step commands consistently  Safety Judgement: Good awareness of safety precautions  Awareness of Errors: Decreased awareness of errors  Insights: Fully aware of deficits  Sequencing and Organization: Able to problem solve independently  Perception  Overall Perceptual Status: Impaired  Initiation: Cues to initiate tasks  Balance  Sitting Balance: Independent  Standing Balance: Stand by assistance  Bed mobility  Supine to Sit: Stand by assistance  Transfers  Sit to stand: Stand by assistance  Stand to sit: Contact guard assistance;Stand by assistance (CGA with quick sit for onset dizziness/pain. )  Standing Balance  Time: Am: 1-2 min x4  Activity: AM: ADL set up, care tasks, functional mobility in room   Comment: limited standing tolerance due to quick onset of fatigue/dizziness/leg weakness reported upon standing at closet, quick seated rest on 4WW seat. Functional Mobility  Functional - Mobility Device: 4-Wheeled Walker  Activity: Retrieve items;Transport items; To/from bathroom  Assist Level: Stand by assistance  Toilet Transfers  Toilet - Technique: Ambulating  Equipment Used: Grab bars  Toilet Transfer: Stand by assistance  Toilet Transfers Comments: with 4DN     Type of ROM/Therapeutic Exercise  Type of ROM/Therapeutic Exercise: Free weights;AROM  Comment: Pt engaged in BUE ex's to address overall strength and endurance for functional tasks; pt utilized 2# in RUE and 3# in LUE, x20 reps each with rest breaks prn.    Exercises  Scapular Protraction: x  Scapular Retraction: x  Shoulder Flexion: x  Shoulder Extension: x  Horizontal ABduction: x  Horizontal ADduction: x  Elbow Flexion: x  Elbow Extension: x  Wrist Flexion: x  Wrist Extension: x  Other: red theraflex bar x15; upward/downward bends, twisting. Additional Activities: Other  Additional Activities: Pt engaged in various sized nuts/bolts activity to promote increase in [facility] skills. Pt tolerates well overall with noted dropping 25%. ADL  Equipment Provided: Reacher;Sock aid  Feeding: Modified independent  (per pt report. )  Grooming: Supervision (seated on 4WW sinkside. )  UE Bathing: Setup;Supervision (seated on 4WW sinkside. )  LE Bathing: None  UE Dressing: Supervision;Setup  LE Dressing: Stand by assistance;Minimal assistance (Assist with donning B TEDs)  Toileting: Stand by assistance        Assessment  Performance deficits / Impairments: Decreased ADL status; Decreased functional mobility ; Decreased ROM; Decreased strength;Decreased safe awareness;Decreased cognition;Decreased endurance;Decreased balance;Decreased high-level IADLs;Decreased fine motor control;Decreased coordination  Prognosis: Good  Discharge Recommendations: Patient would benefit from continued therapy after discharge  Activity Tolerance: Patient limited by fatigue;Patient Tolerated treatment well  Safety Devices in place: Yes           Plan  Plan  Times per week: Plan Of Care:  Due to impaired functional endurance and/or medical issues, the patient is to be seen for a combined total of at least a  900 minutes over 7 days of Physical, Occupational and/or Speech Therapy.   Times per day: Twice a day  Current Treatment Recommendations: Self-Care / ADL, Strengthening, ROM, Balance Training, Functional Mobility Training, Endurance Training, Neuromuscular Re-education, Cognitive Reorientation, Safety Education & Training, Patient/Caregiver Education & Training, Equipment Evaluation, Education, & procurement, Home Management Training, Cognitive/Perceptual Training  Patient Goals   Patient goals : return home safe and indep with self care  Short term goals  Time Frame for Short term goals: 1 week  Short term goal 1: pt to use BUE to cut food successfully and feed self with RUE mod indep. Short term goal 2: SBA ambulate to obtain set up for adls. Short term goal 3: SBA LE bathe and dress, able to don compression stockings with own device with min assist  Short term goal 4: ilana 9-11 min stand, ambulate with SBA and good safety for adls  Short term goal 5: ilana 10 reps x 3 R shld AROM achieving 95 degrees, ilana 10 reps x 3 R hand  strengthening. Long term goals  Time Frame for Long term goals : by discharge  Long term goal 1: mod indep ambulate to obtain set up for adls ([employer])  Long term goal 2: mod indep self care and toileting  Long term goal 3: tolerate 15-17 min stand, ambulate for adls mod indep with good safety. Long term goal 4: indep RUE HEP  Long term goal 5: increase R shoulder AROM to 100 and MMT to 3+ and R  to 20 pounds to improve use of RUE for adls.         09/23/21 0848 09/23/21 1523   OT Individual Minutes   Time In 0728 7688   Time Out [id number]   Minutes 53 35     Electronically signed by DAYANA Mendoza on 9/23/21 at 3:28 PM EDT

## 2021-09-23 NOTE — FLOWSHEET NOTE
09/23/21 1417   Encounter Summary   Services provided to: Patient   Referral/Consult From: [facility]   (9/23/21V)   Volunteer Visit Yes   Complexity of Encounter Low   Length of Encounter 15 minutes   Spiritual/Catholic   Type Spiritual support   Intervention Communion;Prayer   Sacraments   Communion Patient received communion

## 2021-09-23 NOTE — PLAN OF CARE
Problem: Falls - Risk of:  Goal: Will remain free from falls  Description: Will remain free from falls  Outcome: Ongoing     Problem: Skin Integrity:  Goal: Will show no infection signs and symptoms  Description: Will show no infection signs and symptoms  Outcome: Ongoing     Problem: SAFETY  Goal: STG - Patient uses call light consistently to request assistance with transfers  Outcome: Ongoing

## 2021-09-24 LAB
ANION GAP SERPL CALCULATED.3IONS-SCNC: 12 MMOL/L (ref 9–17)
BUN BLDV-MCNC: 41 MG/DL (ref 8–23)
BUN/CREAT BLD: ABNORMAL (ref 9–20)
CALCIUM SERPL-MCNC: 9.6 MG/DL (ref 8.6–10.4)
CHLORIDE BLD-SCNC: 95 MMOL/L (ref 98–107)
CO2: 27 MMOL/L (ref 20–31)
CREAT SERPL-MCNC: 3.04 MG/DL (ref 0.5–0.9)
GFR AFRICAN AMERICAN: 19 ML/MIN
GFR NON-AFRICAN AMERICAN: 16 ML/MIN
GFR SERPL CREATININE-BSD FRML MDRD: ABNORMAL ML/MIN/{1.73_M2}
GFR SERPL CREATININE-BSD FRML MDRD: ABNORMAL ML/MIN/{1.73_M2}
GLUCOSE BLD-MCNC: 105 MG/DL (ref 65–105)
GLUCOSE BLD-MCNC: 127 MG/DL (ref 65–105)
GLUCOSE BLD-MCNC: 142 MG/DL (ref 70–99)
GLUCOSE BLD-MCNC: 145 MG/DL (ref 65–105)
GLUCOSE BLD-MCNC: 304 MG/DL (ref 65–105)
POTASSIUM SERPL-SCNC: 4.6 MMOL/L (ref 3.7–5.3)
SODIUM BLD-SCNC: 134 MMOL/L (ref 135–144)

## 2021-09-24 PROCEDURE — 99232 SBSQ HOSP IP/OBS MODERATE 35: CPT | Performed by: STUDENT IN AN ORGANIZED HEALTH CARE EDUCATION/TRAINING PROGRAM

## 2021-09-24 PROCEDURE — 6370000000 HC RX 637 (ALT 250 FOR IP): Performed by: PHYSICAL MEDICINE & REHABILITATION

## 2021-09-24 PROCEDURE — 97535 SELF CARE MNGMENT TRAINING: CPT

## 2021-09-24 PROCEDURE — 90935 HEMODIALYSIS ONE EVALUATION: CPT

## 2021-09-24 PROCEDURE — 97130 THER IVNTJ EA ADDL 15 MIN: CPT

## 2021-09-24 PROCEDURE — 97530 THERAPEUTIC ACTIVITIES: CPT

## 2021-09-24 PROCEDURE — 80048 BASIC METABOLIC PNL TOTAL CA: CPT

## 2021-09-24 PROCEDURE — 6370000000 HC RX 637 (ALT 250 FOR IP): Performed by: FAMILY MEDICINE

## 2021-09-24 PROCEDURE — 97110 THERAPEUTIC EXERCISES: CPT

## 2021-09-24 PROCEDURE — 2500000003 HC RX 250 WO HCPCS: Performed by: FAMILY MEDICINE

## 2021-09-24 PROCEDURE — 36415 COLL VENOUS BLD VENIPUNCTURE: CPT

## 2021-09-24 PROCEDURE — 99232 SBSQ HOSP IP/OBS MODERATE 35: CPT | Performed by: FAMILY MEDICINE

## 2021-09-24 PROCEDURE — 97116 GAIT TRAINING THERAPY: CPT

## 2021-09-24 PROCEDURE — 1180000000 HC REHAB R&B

## 2021-09-24 PROCEDURE — 97129 THER IVNTJ 1ST 15 MIN: CPT

## 2021-09-24 PROCEDURE — 82947 ASSAY GLUCOSE BLOOD QUANT: CPT

## 2021-09-24 RX ADMIN — Medication 1.9 ML: at 18:13

## 2021-09-24 RX ADMIN — PANTOPRAZOLE SODIUM 40 MG: 40 TABLET, DELAYED RELEASE ORAL at 05:36

## 2021-09-24 RX ADMIN — CARVEDILOL 6.25 MG: 6.25 TABLET, FILM COATED ORAL at 07:24

## 2021-09-24 RX ADMIN — ATORVASTATIN CALCIUM 80 MG: 80 TABLET, FILM COATED ORAL at 07:24

## 2021-09-24 RX ADMIN — TRAZODONE HYDROCHLORIDE 75 MG: 150 TABLET ORAL at 21:23

## 2021-09-24 RX ADMIN — RANOLAZINE 1000 MG: 1000 TABLET, FILM COATED, EXTENDED RELEASE ORAL at 07:26

## 2021-09-24 RX ADMIN — FUROSEMIDE 80 MG: 40 TABLET ORAL at 07:24

## 2021-09-24 RX ADMIN — FUROSEMIDE 80 MG: 40 TABLET ORAL at 18:44

## 2021-09-24 RX ADMIN — GABAPENTIN 300 MG: 300 CAPSULE ORAL at 07:24

## 2021-09-24 RX ADMIN — RANOLAZINE 1000 MG: 1000 TABLET, FILM COATED, EXTENDED RELEASE ORAL at 21:24

## 2021-09-24 RX ADMIN — BUSPIRONE HYDROCHLORIDE 7.5 MG: 7.5 TABLET ORAL at 07:26

## 2021-09-24 RX ADMIN — ASPIRIN 81 MG CHEWABLE TABLET 81 MG: 81 TABLET CHEWABLE at 07:24

## 2021-09-24 RX ADMIN — CARVEDILOL 6.25 MG: 6.25 TABLET, FILM COATED ORAL at 21:23

## 2021-09-24 RX ADMIN — Medication 1.9 ML: at 18:12

## 2021-09-24 RX ADMIN — INSULIN GLARGINE 53 UNITS: 100 INJECTION, SOLUTION SUBCUTANEOUS at 21:25

## 2021-09-24 RX ADMIN — FEBUXOSTAT 40 MG: 40 TABLET, FILM COATED ORAL at 07:26

## 2021-09-24 RX ADMIN — BUSPIRONE HYDROCHLORIDE 7.5 MG: 7.5 TABLET ORAL at 14:06

## 2021-09-24 RX ADMIN — INSULIN LISPRO 1 UNITS: 100 INJECTION, SOLUTION INTRAVENOUS; SUBCUTANEOUS at 21:26

## 2021-09-24 RX ADMIN — INSULIN LISPRO 8 UNITS: 100 INJECTION, SOLUTION INTRAVENOUS; SUBCUTANEOUS at 13:16

## 2021-09-24 RX ADMIN — APIXABAN 5 MG: 5 TABLET, FILM COATED ORAL at 07:24

## 2021-09-24 RX ADMIN — TAMSULOSIN HYDROCHLORIDE 0.4 MG: 0.4 CAPSULE ORAL at 07:24

## 2021-09-24 RX ADMIN — POLYETHYLENE GLYCOL 3350 17 G: 17 POWDER, FOR SOLUTION ORAL at 07:25

## 2021-09-24 RX ADMIN — BUSPIRONE HYDROCHLORIDE 7.5 MG: 7.5 TABLET ORAL at 21:23

## 2021-09-24 RX ADMIN — GABAPENTIN 300 MG: 300 CAPSULE ORAL at 21:23

## 2021-09-24 RX ADMIN — APIXABAN 5 MG: 5 TABLET, FILM COATED ORAL at 21:23

## 2021-09-24 RX ADMIN — INSULIN GLARGINE 53 UNITS: 100 INJECTION, SOLUTION SUBCUTANEOUS at 07:25

## 2021-09-24 RX ADMIN — ACETAMINOPHEN 650 MG: 325 TABLET, FILM COATED ORAL at 21:23

## 2021-09-24 ASSESSMENT — PAIN SCALES - GENERAL: PAINLEVEL_OUTOF10: 4

## 2021-09-24 NOTE — PROGRESS NOTES
[address]   ACUTE REHABILITATION OCCUPATIONAL THERAPY  DAILY NOTE    Date: 21  Patient Name: Sunny Rivera      Room: 0615/1076-16    MRN: [id number]   : 1960  (59 y.o.)  Gender: female   Referring Practitioner: Santa Miranda MD  Diagnosis: Acute encephalopathy, thought to be secondary to hypoperfusion Recrudescence of right-sided stroke symptoms, admit post syncope event during dialysis with AURELIA on CKI. Additional Pertinent Hx: d/c from  acute rehab here on 21 post treat for CVA with R weak. BLE lymphedema -pt notes per GP not to use pumps from home currently. pt to bring in comp garments and brandon. Per PM&R note: Sunny Rivera is a 59 y.o. right-handed female with history of recent CVA, AURELIA on CKD (on hemodialysis), CAD, CHF, atrial fibrillation, HTN, type 2 diabetes admitted to [facility] on 2021. She initially presented following a syncopal episode in dialysis, after which she was reportedly confused. She has reported right lower limb weakness and heaviness. Imaging of the brain has been unremarkable. EEG showed mild encephalopathy with no epileptiform activity. Symptoms are thought to be secondary to hypoperfusion. Pulmonology has been consulted for new oxygen requirement overnight. She currently reports ongoing impaired memory and right-sided numbness/tingling and weakness. Restrictions  Restrictions/Precautions: Fall Risk  Implants present? :  (R upper chest port.)  Other position/activity restrictions: up w/ assist, Hemodialysis M / W / F  Required Braces or Orthoses  Right Lower Extremity Brace:  (Lymphedema wraps)  Left Lower Extremity Brace:  (Lymphedema wraps)  Required Braces or Orthoses?: Yes    Subjective  Comments: Pt pleasant and cooperative. Pt with good awareness of onset dizziness and manages well.    Patient Currently in Pain: Denies  Restrictions/Precautions: Fall Risk  Overall Orientation Status: Within Functional Limits Pain Assessment  Pain Assessment: 0-10  Pain Level: 7  Pain Type: Acute pain  Pain Location: Neck    Objective  Cognition  Overall Cognitive Status: Impaired  Attention Span: Attends with cues to redirect  Memory: Decreased long term memory;Decreased short term memory  Following Commands: Follows one step commands consistently  Safety Judgement: Good awareness of safety precautions  Awareness of Errors: Decreased awareness of errors  Insights: Fully aware of deficits  Sequencing and Organization: Able to problem solve independently  Perception  Overall Perceptual Status: Impaired  Initiation: Cues to initiate tasks  Balance  Sitting Balance: Independent  Standing Balance: Stand by assistance  Bed mobility  Supine to Sit: Supervision  Sit to Supine: Supervision  Transfers  Sit to stand: Supervision;Stand by assistance  Stand to sit: Supervision;Stand by assistance  Transfer Comments: 4WW; good hand brake safety noted. Standing Balance  Activity: AM: ADL set up, care tasks, functional mobility in room   Comment: limited standing tolerance due to onset fatigue and dizziness. Functional Mobility  Functional - Mobility Device: 4-Wheeled Walker  Activity: Retrieve items;Transport items; To/from bathroom  Assist Level: Stand by assistance  Toilet Transfers  Toilet - Technique: Ambulating  Equipment Used: Grab bars  Toilet Transfer: Supervision  Toilet Transfers Comments: with 4WW  Shower Transfers  Shower - Transfer From: Chelsea Ruelas (6WA)  Shower - Transfer Type: To and From  Shower - Transfer To: Shower seat with back  Shower - Technique: Ambulating  Shower Transfers: Stand by assistance  Shower Transfers Comments: grab bar for supported prn when stepping in/out without 4WW. Type of ROM/Therapeutic Exercise  Type of ROM/Therapeutic Exercise: AROM  Exercises  Grasp/Release: 2nd setting hand gripper x20 B hands. Other: red theraflex bar x15; upward/downward bends, twisting.                Commmunity Re-entry  Community Re-Entry Level: Walker (9PZ)  Community Re-entry Level of Assistance: Stand by assistance  Community Re-Entry: [facility] mobility; only minimal tolerance due to fatigue and sudden urge for onset of urination. Pt with noted ataxia when managing very purses with buttons and zippers, managed with increased time. Pt completed toileting in public restroom; able to manage in and out handicap accessbile bathroom stall well. ADL  Equipment Provided: Reacher;Sock aid  Feeding: Modified independent   Grooming: Modified independent  (seated sinkside on 4WW seat)  UE Bathing: Setup  LE Bathing: Supervision  UE Dressing: Setup  LE Dressing: Minimal assistance;Stand by assistance (Assist to maria de jesus Lopez)  Toileting: Stand by assistance (per pt report. )  Additional Comments: Pt completes set up retrieval and transport of clothing and towels in room with SBA due to decreased endurance tolerance and random onset of dizziness. Instrumental ADL's  Instrumental ADLs: Yes  Commmunity Re-entry  Community Re-Entry Level: Walker (1YN)  Community Re-entry Level of Assistance: Stand by assistance  Community Re-Entry: [facility] mobility; only minimal tolerance due to fatigue and sudden urge for onset of urination. Pt with noted ataxia when managing very purses with buttons and zippers, managed with increased time. Pt completed toileting in public restroom; able to manage in and out handicap accessbile bathroom stall well. Assessment  Performance deficits / Impairments: Decreased ADL status; Decreased functional mobility ; Decreased ROM; Decreased strength;Decreased safe awareness;Decreased cognition;Decreased endurance;Decreased balance;Decreased high-level IADLs;Decreased fine motor control;Decreased coordination  Prognosis: Good  Discharge Recommendations: Patient would benefit from continued therapy after discharge  Activity Tolerance: Patient limited by fatigue;Patient Tolerated treatment well  Safety Devices in place: Yes  Type of devices: Call light within reach; Patient at risk for falls; Left in chair           Plan  Plan  Times per week: Plan Of Care:  Due to impaired functional endurance and/or medical issues, the patient is to be seen for a combined total of at least a  900 minutes over 7 days of Physical, Occupational and/or Speech Therapy. Times per day: Twice a day  Current Treatment Recommendations: Self-Care / ADL, Strengthening, ROM, Balance Training, Functional Mobility Training, Endurance Training, Neuromuscular Re-education, Cognitive Reorientation, Safety Education & Training, Patient/Caregiver Education & Training, Equipment Evaluation, Education, & procurement, Home Management Training, Cognitive/Perceptual Training  Patient Goals   Patient goals : return home safe and indep with self care  Short term goals  Time Frame for Short term goals: 1 week  Short term goal 1: pt to use BUE to cut food successfully and feed self with RUE mod indep. Short term goal 2: SBA ambulate to obtain set up for adls. Short term goal 3: SBA LE bathe and dress, able to don compression stockings with own device with min assist  Short term goal 4: ilana 9-11 min stand, ambulate with SBA and good safety for adls  Short term goal 5: ilana 10 reps x 3 R shld AROM achieving 95 degrees, ilana 10 reps x 3 R hand  strengthening. Long term goals  Time Frame for Long term goals : by discharge  Long term goal 1: mod indep ambulate to obtain set up for adls (4 [employer])  Long term goal 2: mod indep self care and toileting  Long term goal 3: tolerate 15-17 min stand, ambulate for adls mod indep with good safety. Long term goal 4: indep RUE HEP  Long term goal 5: increase R shoulder AROM to 100 and MMT to 3+ and R  to 20 pounds to improve use of RUE for adls.         09/24/21 0906 09/24/21 1509   OT Individual Minutes   Time In 0740 1309   Time Out 7332 4020   Minutes 73 28     Electronically signed by Trell Camejo, GARCIA/L on 9/24/21 at 3:19 PM EDT

## 2021-09-24 NOTE — PROGRESS NOTES
Speech Language Pathology  Speech Language Pathology  [facility] Acute Rehab Unit at [facility]  Cognitive/Speech/Language  Treatment Note    Date: 9/24/2021  Patients Name: Roberto Salinas  MRN: [id number]  Diagnosis: Cognitive impairment   Patient Active Problem List   Diagnosis Code    Coronary artery disease I25.10    Chronic diastolic heart failure (HCC) I50.32    Diabetic polyneuropathy associated with type 2 diabetes mellitus (HCC) E11.42    History of coronary artery bypass graft Z95.1    Iron deficiency anemia D50.9    Spinal stenosis of lumbar region with neurogenic claudication M48.062    Mixed hyperlipidemia E78.2    Stage 4 chronic kidney disease (HCC) N18.4    Type 2 diabetes mellitus with kidney complication, with long-term current use of insulin ([facility]) E11.29, Z79.4    Syncope and collapse R55    Obesity, Class II, BMI 35-39.9 E66.9    Thyroid nodule greater than or equal to 1 cm in diameter incidentally noted on imaging study E04.1    Essential hypertension I10    Anemia in stage 4 chronic kidney disease (HCC) N18.4, D63.1    Chronic ischemic heart disease I25.9    Acute cerebrovascular accident (CVA) ([facility] [address] I63.9    Stroke-like symptoms R29.90    Morbid obesity with BMI of 40.0-44.9, adult ([facility]) E66.01, Z68.41    Acute encephalopathy G93.40    Encephalopathy G93.40    Debility R53.81       Pain: 0/10    Cognitive Treatment    Treatment time:  1578-5972    Subjective: [x] Alert [x] Cooperative     [] Confused     [] Agitated    [] Lethargic    Objective/Assessment:  Attention: functional throughout, distractions in room minimized. Recall/Problem solving: Pt. Continues to utilize external memory aide, pt unable to recall name of other speech therapist. Pt encouraged to utilize association strategy. Pt verbalized understanding.      Long term memory exercise handout: 20% accuracy anthony , pt recalling 2/10 questions because of movies/videos she's watched since admission, pt reports having no recollection of past events (including 9/11, [place], and specific information from previous job). Occasionally during task pt would state, \"I just saw this on TV, what was it again\". Pt observed referencing external memory aide (memory book) throughout session when questioning events from the past.     Divergent thinking task: missing letters (categorization) pt completing task with 70% accuracy anthony, improving with mod cues provided. Other: No family present. Pt. Observed using circumlocution independently when having word finding difficulty in conversation and during tasks. Plan:  [x] Continue ST services    [] Discharge from ST:      Discharge recommendations: [] Inpatient Rehab   [] [place]   [] Outpatient Therapy  [] Follow up at trauma clinic   [] Other:       Treatment completed by:  Sera Soto M.A.

## 2021-09-24 NOTE — CARE COORDINATION
Met with pt regarding discharge concerns. Pt expressed interest in out-pt therapy and requested LSW to contact her sister Akosua Alejo. Contacted Hailey and explored options. Sister agreeable to maintain [facility] upon discharge then transition to out-pt therapy when ready. Met with pt; in agreement with plan.

## 2021-09-24 NOTE — PLAN OF CARE
Problem: Falls - Risk of:  Goal: Will remain free from falls  Description: Will remain free from falls  9/24/2021 0313 by Scottie Nolen RN  Outcome: Ongoing  9/23/2021 1826 by Bharat Bee RN  Outcome: Ongoing  Goal: Absence of physical injury  Description: Absence of physical injury  Outcome: Ongoing     Problem: Skin Integrity:  Goal: Will show no infection signs and symptoms  Description: Will show no infection signs and symptoms  9/24/2021 0313 by Scottie Nolen RN  Outcome: Ongoing  9/23/2021 1826 by Bharat Bee RN  Outcome: Ongoing  Goal: Absence of new skin breakdown  Description: Absence of new skin breakdown  Outcome: Ongoing     Problem: SAFETY  Goal: LTG - Patient will demonstrate safety requirements appropriate to situation/environment  Outcome: Ongoing  Goal: LTG - patient will utilize safety techniques  Outcome: Ongoing  Goal: STG - patient locks brakes on wheelchair  Outcome: Ongoing  Goal: STG - Patient uses call light consistently to request assistance with transfers  9/24/2021 0313 by Scottie Nolen RN  Outcome: Ongoing  9/23/2021 1826 by Bharat Bee RN  Outcome: Ongoing     Problem: Musculor/Skeletal Functional Status  Goal: Highest potential functional level  Outcome: Ongoing  Goal: Absence of falls  Outcome: Ongoing     Problem: Nutrition  Goal: Optimal nutrition therapy  Outcome: Ongoing     Problem: Musculor/Skeletal Functional Status  Goal: Highest potential functional level  Outcome: Ongoing  Goal: Absence of falls  Outcome: Ongoing     Problem: Pain:  Goal: Pain level will decrease  Description: Pain level will decrease  Outcome: Ongoing  Goal: Control of acute pain  Description: Control of acute pain  Outcome: Ongoing  Goal: Control of chronic pain  Description: Control of chronic pain  Outcome: Ongoing

## 2021-09-24 NOTE — PROGRESS NOTES
FAMILY MEDICINE  - PROGRESS NOTE    Date:  9/24/2021  Nancy Roman  [id number]      Chief complaint: Debility      Interval History:  Improved, she reports that she is still recovering memories although slowly. She is progressing in therapies. She has no new complaints. She still reports some weakness in her legs but overall they have improve. Specialists notes, labs & imaging reviewed.       Subjective  Constitutional: positive for obesity  Cardiovascular: positive for lower extremity edema  Musculoskeletal:positive for arthralgias, back pain and myalgias  Neurological: positive for memory problems  Endocrine: positive for diabetic symptoms including neuropathy and hyperglycemia:    Objective:    BP (!) 177/66   Pulse 59   Temp 97.1 °F (36.2 °C) (Oral)   Resp 18   Ht 5' 5\" (1.651 m)   Wt 234 lb 12.6 oz (106.5 kg)   SpO2 100%   BMI 39.07 kg/m²   General appearance - alert, well appearing, and in no distress and overweight  Mental status - alert, oriented to person, place, and time  Eyes - pupils equal and reactive, extraocular eye movements intact  Ears - hearing grossly normal bilaterally  Nose - normal and patent, no erythema, discharge or polyps  Mouth - mucous membranes moist, pharynx normal without lesions  Neck - supple, no significant adenopathy  Lymphatics - no palpable lymphadenopathy, no hepatosplenomegaly  Chest - clear to auscultation, no wheezes, rales or rhonchi, symmetric air entry  Heart - normal rate, regular rhythm, normal S1, S2, no murmurs, rubs, clicks or gallops  Abdomen - soft, nontender, nondistended, no masses or organomegaly  Breasts - not examined  Back exam - not examined  Neurological - alert, oriented, normal speech, no focal findings or movement disorder noted  Musculoskeletal - osteoarthritic changes noted in both hands  Extremities - pedal edema trace +  Skin - normal coloration and turgor, no rashes, no suspicious skin lesions noted    Data:   Medications:   Current Facility-Administered Medications   Medication Dose Route Frequency Provider Last Rate Last Admin    0.9 % sodium chloride infusion  25 mL IntraVENous PRN Yang Wolf MD        ondansetron (ZOFRAN-ODT) disintegrating tablet 4 mg  4 mg Oral Q8H PRN Yang Wolf MD        sodium chloride flush 0.9 % injection 5-40 mL  5-40 mL IntraVENous PRN Yang Wolf MD        apixaban (ELIQUIS) tablet 5 mg  5 mg Oral BID Yang Wolf MD   5 mg at 09/24/21 0724    aspirin chewable tablet 81 mg  81 mg Oral Daily Yang Wolf MD   81 mg at 09/24/21 0724    atorvastatin (LIPITOR) tablet 80 mg  80 mg Oral Daily Yang Wolf MD   80 mg at 09/24/21 0724    busPIRone (BUSPAR) tablet 7.5 mg  7.5 mg Oral TID Yang Wolf MD   7.5 mg at 09/24/21 0726    carvedilol (COREG) tablet 6.25 mg  6.25 mg Oral BID Yang Wolf MD   6.25 mg at 09/24/21 0724    dextrose 5 % solution  100 mL/hr IntraVENous PRN Yang Wolf MD        dextrose 50 % IV solution  12.5 g IntraVENous PRN Yang Wolf MD        febuxostat (ULORIC) tablet 40 mg  40 mg Oral Daily Yang Wolf MD   40 mg at 09/24/21 0726    furosemide (LASIX) tablet 80 mg  80 mg Oral BID Yang Wolf MD   80 mg at 09/24/21 0724    gabapentin (NEURONTIN) capsule 300 mg  300 mg Oral BID Yang Wolf MD   300 mg at 09/24/21 0724    glucagon (rDNA) injection 1 mg  1 mg IntraMUSCular PRN Yang Wolf MD        glucose (GLUTOSE) 40 % oral gel 15 g  15 g Oral PRN Yang Wolf MD        insulin glargine (LANTUS) injection vial 53 Units  53 Units SubCUTAneous BID Yang Wolf MD   53 Units at 09/24/21 0725    insulin lispro (HUMALOG) injection vial 0-12 Units  0-12 Units SubCUTAneous TID WC Yang Wolf MD   4 Units at 09/23/21 1636    insulin lispro (HUMALOG) injection vial 0-6 Units  0-6 Units SubCUTAneous Nightly Yang Wolf MD   1 Units at 09/23/21 4566    pantoprazole (PROTONIX) tablet 40 mg  40 mg Oral QAM AC Yang Wolf MD   40 mg at 09/24/21 0536    perflutren lipid microspheres (DEFINITY) injection 2.2 mg  2 mL IntraVENous ONCE PRN Yang Wolf MD        polyethylene glycol (GLYCOLAX) packet 17 g  17 g Oral Daily PRN Yang Wolf MD        ranolazine [facility]) extended release tablet 1,000 mg  1,000 mg Oral BID Yang Wolf MD   1,000 mg at 09/24/21 0726    sodium citrate 4 % injection 1.9 mL  1.9 mL IntraCATHeter PRN Yang Wolf MD        sodium citrate 4 % injection 1.9 mL  1.9 mL IntraCATHeter PRN Yang Wolf MD        [facility]) capsule 0.4 mg  0.4 mg Oral Daily Yang Wolf MD   0.4 mg at 09/24/21 0724    traZODone (DESYREL) tablet 75 mg  75 mg Oral Nightly Yang Wolf MD   75 mg at 09/23/21 2155    acetaminophen (TYLENOL) tablet 650 mg  650 mg Oral Q4H PRN Jaquelin Johnson MD   650 mg at 09/23/21 0837    polyethylene glycol (GLYCOLAX) packet 17 g  17 g Oral Daily Michelle Raissa Alexander MD   17 g at 09/24/21 0725    senna (SENOKOT) tablet 17.2 mg  2 tablet Oral Daily PRN Jaquelin Johnson MD   17.2 mg at 09/23/21 0729    bisacodyl (DULCOLAX) suppository 10 mg  10 mg Rectal Daily PRN Jaquelin Johnson MD         No intake or output data in the 24 hours ending 09/24/21 0747  Recent Results (from the past 24 hour(s))   POC Glucose Fingerstick    Collection Time: 09/23/21 11:22 AM   Result Value Ref Range    POC Glucose 353 (H) 65 - 105 mg/dL   POC Glucose Fingerstick    Collection Time: 09/23/21  1:04 PM   Result Value Ref Range    POC Glucose 309 (H) 65 - 105 mg/dL   POC Glucose Fingerstick    Collection Time: 09/23/21  3:53 PM   Result Value Ref Range    POC Glucose 221 (H) 65 - 105 mg/dL   POC Glucose Fingerstick    Collection Time: 09/23/21  9:19 PM   Result Value Ref Range    POC Glucose 181 (H) 65 - 105 mg/dL   POC Glucose Fingerstick    Collection Time: 09/24/21  6:40 AM Result Value Ref Range    POC Glucose 127 (H) 65 - 105 mg/dL     -----------------------------------------------------------------  RAD:  EKG:  Micro:     Assessment & Plan:    Patient Active Problem List:     Coronary artery disease     Chronic diastolic heart failure (HCC)     Diabetic polyneuropathy associated with type 2 diabetes mellitus ([facility]     History of coronary artery bypass graft     Iron deficiency anemia     Spinal stenosis of lumbar region with neurogenic claudication     Mixed hyperlipidemia     Stage 4 chronic kidney disease ([facility])     Type 2 diabetes mellitus with kidney complication, with long-term current use of insulin ([facility])     Syncope and collapse     Obesity, Class II, BMI 35-39.9     Thyroid nodule greater than or equal to 1 cm in diameter incidentally noted on imaging study     Essential hypertension     Anemia in stage 4 chronic kidney disease ([facility])     Chronic ischemic heart disease     Acute cerebrovascular accident (CVA) ([facility])     Stroke-like symptoms     Morbid obesity with BMI of 40.0-44.9, adult ([facility]     Acute encephalopathy     Encephalopathy     Debility           Plan:  -Debility s/p hypoxic encephalopathy - progressing in therapies, memories returning slowly. -AURELIA requiring dialysis - M,W,F, Nephrology managing.  -Acute hypoxic respiratory failure - improved, patient on room air, Pulmonology following and she needs outpatient ERICK work up.  -Essential HTN - stable. -DM2 - BSs stable on carb control renal diet & SS coverage.  -Continue current treatments.  -Patient medically stable for discharge home with home care.  -Complete orders per chart.     See orders   Disposition:    Electronically signed by Janae Carter MD on 9/24/2021 at 7:47 AM

## 2021-09-24 NOTE — CARE COORDINATION
Attempted to contact Jose F Ford at [facility]; vm left    12:30 PM Contacted [facility] and notified of pt discharge date and she would be at her scheduled time on Monday at 11:15 AM.

## 2021-09-24 NOTE — PROGRESS NOTES
Physical Therapy  Facility/Department: [facility] ACUTE REHAB  Daily Treatment Note  NAME: Carole Oglesby  : 1960  MRN: [id number]    Date of Service: 2021    Discharge Recommendations:  Patient would benefit from continued therapy after discharge, Home with assist PRN   PT Equipment Recommendations  Equipment Needed: No  Other: Pt has rollator at home    Assessment   Body structures, Functions, Activity limitations: Decreased functional mobility ; Decreased strength;Decreased endurance;Decreased safe awareness;Decreased balance;Decreased cognition  Treatment Diagnosis: impaired mobility due to weakness and balance issues  Specific instructions for Next Treatment: Monitor sao2 with activity. instruct in strengthening and balance activities, instruct in standing balance and transfers/ gait  as LE strength improves  History: admitted due to acute encephalopathy  REQUIRES PT FOLLOW UP: Yes  Activity Tolerance  Activity Tolerance: Patient limited by fatigue;Patient limited by endurance; Other     Patient Diagnosis(es): There were no encounter diagnoses. has a past medical history of Backache, unspecified, CHF (congestive heart failure) ([facility] Chronic kidney disease, Coronary atherosclerosis of artery bypass graft, Cramp of limb, Gallstones, Hypertension, Insomnia, Pneumonia, Type II or unspecified type diabetes mellitus with renal manifestations, not stated as uncontrolled(250.40), Type II or unspecified type diabetes mellitus without mention of complication, not stated as uncontrolled, and Unspecified vitamin D deficiency. has a past surgical history that includes Coronary artery bypass graft; Knee arthroscopy; Carpal tunnel release; Breast surgery; Tonsillectomy; Hand surgery; Ankle fracture surgery; Cholecystectomy, open (N/A); and IR TUNNELED CVC PLACE WO SQ PORT/PUMP > 5 YEARS (2021).     Restrictions  Restrictions/Precautions  Restrictions/Precautions: Fall Risk  Required Braces or Orthoses?: Yes  Implants present? :  (R upper chest port.)  Required Braces or Orthoses  Right Lower Extremity Brace:  (Lymphedema wraps)  Left Lower Extremity Brace:  (Lymphedema wraps)  Position Activity Restriction  Other position/activity restrictions: up w/ assist, Hemodialysis M / W / F  Subjective   General  Chart Reviewed: Yes  Additional Pertinent Hx: Per PM&R note: Yvette Spence is a 59 y.o. right-handed female with history of recent CVA, AURELIA on CKD (on hemodialysis), CAD, CHF, atrial fibrillation, HTN, type 2 diabetes admitted to [address] [place] [address] on 9/13/2021. She initially presented following a syncopal episode in dialysis, after which she was reportedly confused. She has reported right lower limb weakness and heaviness. Imaging of the brain has been unremarkable. EEG showed mild encephalopathy with no epileptiform activity. Symptoms are thought to be secondary to hypoperfusion. Pulmonology has been consulted for new oxygen requirement overnight. She currently reports ongoing impaired memory and right-sided numbness/tingling and weakness. She thinks that symptoms are improving a little bit in the right upper limb. She also reports intermittent headache and dizziness. She notes shortness of breath with activity and decreased urine output as well. Pt admitted to rehab unit on 9/17/21  Response To Previous Treatment: Patient with no complaints from previous session. Family / Caregiver Present: No  Referring Practitioner: Dr. Bryn Avila: Patient continuing to be emotional over memory loss.  Emotional suppport given from writer   Pain Screening  Patient Currently in Pain: Denies  Vital Signs  Patient Currently in Pain: Denies       Orientation  Orientation  Overall Orientation Status: Within Functional Limits  Objective      Transfers  Sit to Stand: Stand by assistance  Stand to sit: Stand by assistance  Bed to Chair: Stand by assistance  Stand Pivot Transfers: Stand by assistance  Comment: Transfers completed with B UE support on rollator. Ambulation  Ambulation?: Yes  Ambulation 1  Surface: level tile  Device: Rollator  Assistance: Stand by assistance  Quality of Gait: pt requires rest break before further activity  Gait Deviations: Slow Annie; Increased ELISABET; Decreased step length;Decreased step height  Distance: 100ft x2 AM   Comments: Pt reported that her RLE felt very heavy  Stairs/Curb  Stairs?: Yes  Stairs  # Steps : 9  Stairs Height: 8\"  Rails: Bilateral  Device: No Device  Assistance: Stand by assistance  Comment: no LOB; patient able to complete without seated rest break         Other exercises  Other exercises?: Yes  Other exercises 1: seated B LE exercises x20 reps with #2.5 and blue tband   Other exercises 2: seated UBE 5 minutes FWD, 5 min BWD  Other exercises 3: NuStep: L2, 15 minutes  Other exercises 4: stand pivot transfer x4         Comment: Breaks given PRN    Goals  Short term goals  Time Frame for Short term goals: 5 days  Short term goal 1: pt to tolerate 1/2 hour to 45 minutes for therapuetic exercise and activity  Short term goal 2: pt to demonstrate good technique for LE strengthening and  balance activities  Short term goal 3: pt to demonstrate  independent bed mobility with out bed rail. Short term goal 4: pt to demonstrate safe tech for transfers from varies surfaces, at supervsion level  Short term goal 5: pt able to ambulate with rollator distance fo 120 ft or more, SBA, level surfaces  Short term goal 6: pt able to go up and down 3 to 5 steps with 2 rails CGA to improve strength and endurance. Short term goal 7: pt to demonstrate goobalance during gait with rollator.   Long term goals  Time Frame for Long term goals : By DC  Long term goal 1: pt able to demonstrate mod-I transfers  Long term goal 2:  pt able to ambulate safely on level as well as carpetted/outside terraine with rollator, atleast distance of 150 ft, mod-I  Long term goal 3: pt able to go up and down 5 steps with2 rails , SBA to improve endurance. Long term goal 4: Improve gait speed by demonstrating ability to ambulate 150 ft with rollator in 2 minutes to improve fucntion. Long term goal 5: Improve R LE strength by 1/3 MMG to improve fucntion. Patient Goals   Patient goals : get better    Plan    Plan  Times per week: 900 minutes/week for combined therapy of PT/OT/ST due to decreased tolerance to activity and Hemodialysis. Specific instructions for Next Treatment: Monitor sao2 with activity. instruct in strengthening and balance activities, instruct in standing balance and transfers/ gait  as LE strength improves  Current Treatment Recommendations: Strengthening, Functional Mobility Training, Equipment Evaluation, Education, & procurement, Safety Education & Training, Gait Training, Transfer Training, Balance Training, Endurance Training, Positioning, Patient/Caregiver Education & Training, Stair training, Neuromuscular Re-education  Safety Devices  Type of devices:  All fall risk precautions in place, Gait belt, Patient at risk for falls        09/24/21 1126 09/24/21 1311   PT Individual Minutes   Time In 0930 1240   Time Out 1020 1307   Minutes 50 27   PT Concurrent Minutes   Time In 1020  --    Time Out 1030  --    Minutes 10  --      Electronically signed by Leo Clark PTA on 9/24/21 at 1:15 PM EDT

## 2021-09-24 NOTE — PROGRESS NOTES
NEPHROLOGY PROGRESS NOTE    Patient :  Jonn Marsh; 59 y.o. MRN# [id number]  Location:  7512/2996-21  Attending:  Jaspal Torres Date:  9/17/2021   Hospital Day: 7      Reason for Consult: Acute kidney injury dialysis dependent      Chief Complaint: Confusion  History Obtained From: Patient    History of Present Illness: This is a 59 y.o. female with past medical history of longstanding type 2 diabetes insulin-dependent diabetes mellitus, essential hypertension, coronary artery disease status post CABG in 2004, coronary artery stent in 2019, CHF with EF of 55%, history of mild to moderate LVH diastolic dysfunction, recent history of acute/subacute stroke in left frontal lobe with right-sided weakness, patient developed acute kidney injury due to contrast-induced nephropathy requiring dialysis since August 2021  Patient currently receives dialysis Monday Wednesday Friday under Dr. Johnathon Cevallos at [facility]. Patient presented from dialysis with altered mental status. She apparently had transient loss of consciousness but unclear the duration and was confused. She has numbness over the right lower extremity and cannot feel the right side of her body which is new. Patient had MRI of the brain which showed no acute changes. MRA of the neck showed no hemodynamically significant stenosis identified. Patient transferred to acute rehab on 9/17/21.   Nephrologist consulted for continuation of dialysis    Subjective/interval history:    Patient seen and examined, no acute events overnight  Patient is scheduled for dialysis today  Blood pressure stable        Past Medical History:        Diagnosis Date    Backache, unspecified     CHF (congestive heart failure) (HCC)     Chronic kidney disease     Coronary atherosclerosis of artery bypass graft     Cramp of limb     Gallstones     Hypertension     Insomnia     Pneumonia     Type II or unspecified type diabetes mellitus with renal manifestations, not stated as uncontrolled(250.40)     Type II or unspecified type diabetes mellitus without mention of complication, not stated as uncontrolled     Unspecified vitamin D deficiency        Past Surgical History:        Procedure Laterality Date    ANKLE FRACTURE SURGERY      BREAST SURGERY      CARPAL TUNNEL RELEASE      CHOLECYSTECTOMY, OPEN N/A     CORONARY ARTERY BYPASS GRAFT      x3    HAND SURGERY      IR TUNNELED CATHETER PLACEMENT GREATER THAN 5 YEARS  8/18/2021    IR TUNNELED CATHETER PLACEMENT GREATER THAN 5 YEARS 8/18/2021 [facility]    KNEE ARTHROSCOPY      right    TONSILLECTOMY         Current Medications:    0.9 % sodium chloride infusion, PRN  ondansetron (ZOFRAN-ODT) disintegrating tablet 4 mg, Q8H PRN  sodium chloride flush 0.9 % injection 5-40 mL, PRN  apixaban (ELIQUIS) tablet 5 mg, BID  aspirin chewable tablet 81 mg, Daily  atorvastatin (LIPITOR) tablet 80 mg, Daily  busPIRone (BUSPAR) tablet 7.5 mg, TID  carvedilol (COREG) tablet 6.25 mg, BID  dextrose 5 % solution, PRN  dextrose 50 % IV solution, PRN  febuxostat (ULORIC) tablet 40 mg, Daily  furosemide (LASIX) tablet 80 mg, BID  gabapentin (NEURONTIN) capsule 300 mg, BID  glucagon (rDNA) injection 1 mg, PRN  glucose (GLUTOSE) 40 % oral gel 15 g, PRN  insulin glargine (LANTUS) injection vial 53 Units, BID  insulin lispro (HUMALOG) injection vial 0-12 Units, TID WC  insulin lispro (HUMALOG) injection vial 0-6 Units, Nightly  pantoprazole (PROTONIX) tablet 40 mg, QAM AC  perflutren lipid microspheres (DEFINITY) injection 2.2 mg, ONCE PRN  polyethylene glycol (GLYCOLAX) packet 17 g, Daily PRN  ranolazine (RANEXA) extended release tablet 1,000 mg, BID  sodium citrate 4 % injection 1.9 mL, PRN  sodium citrate 4 % injection 1.9 mL, PRN  tamsulosin (FLOMAX) capsule 0.4 mg, Daily  traZODone (DESYREL) tablet 75 mg, Nightly  acetaminophen (TYLENOL) tablet 650 mg, Q4H PRN  polyethylene glycol (GLYCOLAX) packet 17 g, Daily  senna (SENOKOT) tablet 17.2 mg, Daily PRN  bisacodyl (DULCOLAX) suppository 10 mg, Daily PRN        Allergies:  Adhesive tape, Ace inhibitors, Iv dye [iodides], and Metformin and related    Social History:   Social History     Socioeconomic History    Marital status: Single     Spouse name: Not on file    Number of children: Not on file    Years of education: Not on file    Highest education level: Not on file   Occupational History    Not on file   Tobacco Use    Smoking status: Never Smoker    Smokeless tobacco: Never Used   Substance and Sexual Activity    Alcohol use: No    Drug use: No    Sexual activity: Not Currently   Other Topics Concern    Not on file   Social History Narrative    Not on file     Social Determinants of Health     Financial Resource Strain: Low Risk     Difficulty of Paying Living Expenses: Not hard at all   Food Insecurity:     Worried About [address] in the Last Year:     [address] [place] in the Last Year:    Transportation Needs:     Lack of Transportation (Medical):      Lack of Transportation (Non-Medical):    Physical Activity:     Days of Exercise per Week:     Minutes of Exercise per Session:    Stress:     Feeling of Stress :    Social Connections:     Frequency of Communication with Friends and Family:     Frequency of Social Gatherings with Friends and Family:     Attends [employer] Services:     Active Member of Clubs or Organizations:     Attends Club or Organization Meetings:     Marital Status:    Intimate Partner Violence:     Fear of Current or Ex-Partner:     Emotionally Abused:     Physically Abused:     Sexually Abused:            Objective:  CURRENT TEMPERATURE:  Temp: 97.6 °F (36.4 °C)  MAXIMUM TEMPERATURE OVER 24HRS:  Temp (24hrs), Av.4 °F (36.3 °C), Min:97.1 °F (36.2 °C), Max:97.6 °F (36.4 °C)    CURRENT RESPIRATORY RATE:  Resp: 18  CURRENT PULSE:  Pulse: 63  CURRENT BLOOD PRESSURE:  BP: (!) 132/56  24HR BLOOD PRESSURE RANGE: Systolic (49ZIA), WZD:336 , Min:132 , PTX:621   ; Diastolic (87PTR), EMILY:90, Min:56, Max:66    24HR INTAKE/OUTPUT:    No intake or output data in the 24 hours ending 09/24/21 1308  Patient Vitals for the past 96 hrs (Last 3 readings):   Weight   09/22/21 1759 234 lb 12.6 oz (106.5 kg)   09/22/21 1338 239 lb 3.2 oz (108.5 kg)   09/20/21 1800 239 lb 3.2 oz (108.5 kg)       Physical Exam:  GENERAL APPEARANCE: Alert and cooperative, and appears to be in no acute distress. HEAD: normocephalic  THROAT:  Oral cavity and pharynx normal. Moist  CARDIAC: Normal S1 and S2. No S3, S4 or murmurs. Rhythm is regular. LUNGS: Clear to auscultation and percussion without rales, rhonchi, wheezing or diminished breath sounds. NECK: Neck supple, non-tender without lymphadenopathy, masses or thyromegaly. JVD-neg  BACK: Examination of the spine reveals normal gait and posture, no spinal deformity, symmetry of spinal muscles, without tenderness, decreased range of motion or muscular spasm  MUSKULOSKELETAL: Adequately aligned spine. No joint erythema or tenderness. EXTREMITIES: No edema. Peripheral pulses intact. NEURO: Right-sided weakness and numbness    Labs:      Radiology:  Reviewed as available. Assessment:  1. AURELIA secondary to ATN, dialysis dependent on hemodialysis Monday Wednesday Friday  2. Essential hypertension  3. History of CVA  4. Type 2 diabetes insulin-dependent diabetes mellitus       Plan:  1. Hemodialysis Monday Wednesday Friday, HD today as per orders  2. [facility] Monday Wednesday Friday      Thank you for the consultation.       Electronically signed by Park Cavazos MD on 9/24/2021 at 1:08 PM

## 2021-09-24 NOTE — PROGRESS NOTES
Physical Medicine & Rehabilitation  Progress Note      Subjective:      Antoinette Tsai is a 59 y.o. female with encephalopathy, recrudescence of right-sided stroke symptoms. She reports doing fine today. She notes continued difficulty with long-term memory. Short-term memory appears to be much improved. Discussed discharge plans with patient and sister (on phone). After discussion with team and patient/family, will continue plan of starting home health therapies initially and transitioning to outpatient therapies when able. ROS:  Denies fevers, chills, sweats. No chest pain, palpitations, lightheadedness. Denies coughing, wheezing or shortness of breath. Denies abdominal pain, nausea, diarrhea or constipation. No new areas of joint pain. Denies new areas of numbness or weakness. Denies new anxiety or depression issues. No new skin problems. Rehabilitation:   Progressing in therapies. PT:  Restrictions/Precautions: Fall Risk  Implants present? :  (R upper chest port.)  Other position/activity restrictions: up w/ assist, Hemodialysis M / W / F  Required Braces or Orthoses  Right Lower Extremity Brace:  (Lymphedema wraps)  Left Lower Extremity Brace:  (Lymphedema wraps)   Transfers  Sit to Stand: Stand by assistance  Stand to sit: Stand by assistance  Bed to Chair: Stand by assistance  Stand Pivot Transfers: Stand by assistance  Comment: Transfers completed with B UE support on rollator.   Ambulation 1  Surface: level tile  Device: Rollator  Assistance: Stand by assistance  Quality of Gait: pt requires rest break before further activity  Gait Deviations: Slow Annie, Increased ELISABET, Decreased step length, Decreased step height  Distance: 100ft x2 AM   Comments: Pt reported that her RLE felt very heavy    Transfers  Sit to Stand: Stand by assistance  Stand to sit: Stand by assistance  Bed to Chair: Stand by assistance  Stand Pivot Transfers: Stand by assistance  Comment: Transfers completed with Supervision  Scooting: Supervision  Comment: Unable to assess as patient was in chair upon arrival.  Transfers  Stand Step Transfers: Contact guard assistance (w/4WW)  Stand Pivot Transfers: Contact guard assistance  Sit to stand: Supervision, Stand by assistance  Stand to sit: Supervision, Stand by assistance  Transfer Comments: 4WW; good hand brake safety noted. Toilet Transfers  Toilet - Technique: Ambulating  Equipment Used: Grab bars  Toilet Transfer: Supervision  Toilet Transfers Comments: with 4WW     Shower Transfers  Shower - Transfer From: Isaias Perez (9GN)  Shower - Transfer Type: To and From  Shower - Transfer To: Shower seat with back  Shower - Technique: Ambulating  Shower Transfers: Stand by assistance  Shower Transfers Comments: grab bar for supported prn when stepping in/out without 4WW. SPEECH:  Subjective: [x]? Alert     [x]? Cooperative     []? Confused     []? Agitated    []? Lethargic     Objective/Assessment:  Attention: functional throughout, distractions in room minimized.         Recall/Problem solving: Pt. Continues to utilize external memory aide, pt unable to recall name of other speech therapist. Pt encouraged to utilize association strategy. Pt verbalized understanding.      Long term memory exercise handout: 20% accuracy anthony , pt recalling 2/10 questions because of movies/videos she's watched since admission, pt reports having no recollection of past events (including 9/11, [place], and specific information from previous job). Occasionally during task pt would state, \"I just saw this on TV, what was it again\". Pt observed referencing external memory aide (memory book) throughout session when questioning events from the past.      Divergent thinking task: missing letters (categorization) pt completing task with 70% accuracy anthony, improving with mod cues provided.      Other: No family present.   Pt. Observed using circumlocution independently when having word finding difficulty in conversation and during tasks. Subjective: [x]? Alert     [x]? Cooperative     []? Confused     []? Agitated    []? Lethargic     Objective/Assessment:  Attention: functional throughout, distractions in room minimized.         Recall/Problem solving: Pt. continues to utilize external memory aide. Discussed use of memory scrapbook of pictures of Pt., family members, significant events in Pt's life, etc. to facilitate long term memory. Pt. verbalized understanding and agreeable to this stating enjoys scrapbooking and family can help with this.       Reasoning, ID similarities/differences between 2 items- 90% accuracy anthony, improving to 100% c min cue.      Other: No family present. Pt. Reporting notices stuttering recently when excited and engaged in conversation. Education provided re: fluency strategies (e.g., light contacts of articulators, reduced rate). Pt. Verbalized understanding and attempting to use strategies throughout session. Stuttering noted 2x during session consisting of part-word repetitions.       Pt. stating would like writer to speak with sister re: compensatory strategies and tasks she can complete at home to target memory. ST to speak with Pt's sister later today.       1500- Spoke with Pt. and Pt's sister ([facility]) on phone re: recommendations and strategies for facilitating long term recall (e.g., use of memory scrapbook of friends/family with image and information about person/scene, use of familiar music to stimulate recall of artist/lyrics/memory with song, use of magazines/newspapers/articles of notable events/people, use of memory journal, describing directions to locations/landmarks on familiar streets, visual/verbal cueing, etc.). Pt's sister had several questions. All questions answered by Evangelina Suh.   Pt's sister verbalized understanding of all recommendations and reporting will try different tasks at home with Pt.  Pt's sister stating will contact different family and friends of Pt., have each person write a paragraph of memories with Pt., and compile pictures for scrapbook. Pt. reporting enjoys scrapbooking and looking forward to this.         Current Medications:   Current Facility-Administered Medications: 0.9 % sodium chloride infusion, 25 mL, IntraVENous, PRN  ondansetron (ZOFRAN-ODT) disintegrating tablet 4 mg, 4 mg, Oral, Q8H PRN **OR** [DISCONTINUED] ondansetron (ZOFRAN) injection 4 mg, 4 mg, IntraVENous, Q6H PRN  sodium chloride flush 0.9 % injection 5-40 mL, 5-40 mL, IntraVENous, PRN  apixaban (ELIQUIS) tablet 5 mg, 5 mg, Oral, BID  aspirin chewable tablet 81 mg, 81 mg, Oral, Daily  atorvastatin (LIPITOR) tablet 80 mg, 80 mg, Oral, Daily  busPIRone (BUSPAR) tablet 7.5 mg, 7.5 mg, Oral, TID  carvedilol (COREG) tablet 6.25 mg, 6.25 mg, Oral, BID  dextrose 5 % solution, 100 mL/hr, IntraVENous, PRN  dextrose 50 % IV solution, 12.5 g, IntraVENous, PRN  febuxostat (ULORIC) tablet 40 mg, 40 mg, Oral, Daily  furosemide (LASIX) tablet 80 mg, 80 mg, Oral, BID  gabapentin (NEURONTIN) capsule 300 mg, 300 mg, Oral, BID  glucagon (rDNA) injection 1 mg, 1 mg, IntraMUSCular, PRN  glucose (GLUTOSE) 40 % oral gel 15 g, 15 g, Oral, PRN  insulin glargine (LANTUS) injection vial 53 Units, 53 Units, SubCUTAneous, BID  insulin lispro (HUMALOG) injection vial 0-12 Units, 0-12 Units, SubCUTAneous, TID WC  insulin lispro (HUMALOG) injection vial 0-6 Units, 0-6 Units, SubCUTAneous, Nightly  pantoprazole (PROTONIX) tablet 40 mg, 40 mg, Oral, QAM AC  perflutren lipid microspheres (DEFINITY) injection 2.2 mg, 2 mL, IntraVENous, ONCE PRN  polyethylene glycol (GLYCOLAX) packet 17 g, 17 g, Oral, Daily PRN  ranolazine (RANEXA) extended release tablet 1,000 mg, 1,000 mg, Oral, BID  tamsulosin (FLOMAX) capsule 0.4 mg, 0.4 mg, Oral, Daily  traZODone (DESYREL) tablet 75 mg, 75 mg, Oral, Nightly  acetaminophen (TYLENOL) tablet 650 mg, 650 mg, Oral, Q4H PRN  polyethylene glycol (GLYCOLAX) packet 17 g, 17 g, Oral, Daily  senna (SENOKOT) tablet 17.2 mg, 2 tablet, Oral, Daily PRN  bisacodyl (DULCOLAX) suppository 10 mg, 10 mg, Rectal, Daily PRN      Objective:  BP (!) 155/68   Pulse 63   Temp 97.6 °F (36.4 °C)   Resp 18   Ht 5' 5\" (1.651 m)   Wt 232 lb 9.4 oz (105.5 kg)   SpO2 100%   BMI 38.70 kg/m²       GEN: Well developed, well nourished, no acute distress  HEENT: NCAT. EOM grossly intact. Hearing grossly intact. Mucous membranes pink and moist.  RESP: Normal breath sounds with no wheezing, rales, or rhonchi. Respirations WNL and unlabored. CV: Regular rate and rhythm. No murmurs, rubs, or gallops. ABD: Soft, non-distended, BS+ and equal.  NEURO: Alert. Speech fluent. Impaired memory (primarily long-term). MSK:  Muscle bulk is normal bilaterally. Moving bilateral upper limbs with at least antigravity strength. SKIN: Warm and dry with good turgor. PSYCH: Mood WNL. Affect WNL. Appropriately interactive. Diagnostics:     CBC:   Recent Labs     09/22/21  0700   WBC 4.9   RBC 3.46*   HGB 11.2*   HCT 33.2*   MCV 95.9   RDW 14.2   *     BMP:   Recent Labs     09/22/21  0700 09/24/21  0724   * 134*   K 4.5 4.6   CL 95* 95*   CO2 25 27   BUN 32* 41*   CREATININE 3.08* 3.04*   GLUCOSE 156* 142*     BNP: No results for input(s): BNP in the last 72 hours. PT/INR: No results for input(s): PROTIME, INR in the last 72 hours. APTT: No results for input(s): APTT in the last 72 hours. CARDIAC ENZYMES: No results for input(s): CKMB, CKMBINDEX, TROPONINT in the last 72 hours. Invalid input(s): CKTOTAL;3  FASTING LIPID PANEL:  Lab Results   Component Value Date    CHOL 105 04/09/2015    HDL 43 04/09/2015    TRIG 168 04/09/2015     LIVER PROFILE: No results for input(s): AST, ALT, ALB, BILIDIR, BILITOT, ALKPHOS in the last 72 hours. Impression/Plan:   Impaired ADLs, gait, and mobility due to:    1. Encephalopathy:  Thought to be secondary to hypoperfusion. SLP for cognition/memory.   2. Recrudescence of right-sided stroke symptoms: In the setting of recent CVA. PT/OT  for gait, mobility, strengthening, endurance, ADLs, and self care. On aspirin, atorvastatin. 3. Anemia:  Hemoglobin 11.2 on 9/22, stable. Monitoring. On iron supplementation - discontinued 9/21, as patient is refusing due to constipation with iron supplement. 4. Thrombocytopenia:  Platelets 578 on 7/35, stable. Monitoring. 5. AURELIA on CKD: On hemodialysis MWF. Nephrology following. 6. CAD:  On aspirin, atorvastatin, ranexa  7. Atrial fibrillation:  On eliquis, carvedilol  8. HTN:  On carvedilol, furosemide  9. Type 2 diabetes with neuropathy:  On lantus, humalog sliding scale. On gabapentin  10. Anxiety: On buspirone TID  11. Insomnia:  On trazodone nightly  12. Gout:  On febuxostat  13. Obesity  14. Bowel Management: Miralax daily, senokot prn, dulcolax prn. 15. DVT prophylaxis:  On eliquis  16.  Internal Medicine for medical management      Electronically signed by Josh Jean MD on 9/24/2021 at 10:57 PM

## 2021-09-24 NOTE — PROGRESS NOTES
Speech Language Pathology  Speech Language Pathology  [facility] Acute Rehab Unit at [address]  Cognitive/Speech/Language  Treatment Note    Date: 9/24/2021  Patients Name: Jessica Powell  MRN: [id number]  Diagnosis: Cognitive impairment   Patient Active Problem List   Diagnosis Code    Coronary artery disease I25.10    Chronic diastolic heart failure (HCC) I50.32    Diabetic polyneuropathy associated with type 2 diabetes mellitus (HCC) E11.42    History of coronary artery bypass graft Z95.1    Iron deficiency anemia D50.9    Spinal stenosis of lumbar region with neurogenic claudication M48.062    Mixed hyperlipidemia E78.2    Stage 4 chronic kidney disease (HCC) N18.4    Type 2 diabetes mellitus with kidney complication, with long-term current use of insulin ([facility]) E11.29, Z79.4    Syncope and collapse R55    Obesity, Class II, BMI 35-39.9 E66.9    Thyroid nodule greater than or equal to 1 cm in diameter incidentally noted on imaging study E04.1    Essential hypertension I10    Anemia in stage 4 chronic kidney disease (HCC) N18.4, D63.1    Chronic ischemic heart disease I25.9    Acute cerebrovascular accident (CVA) ([facility] I63.9    Stroke-like symptoms R29.90    Morbid obesity with BMI of 40.0-44.9, adult ([facility]) E66.01, Z68.41    Acute encephalopathy G93.40    Encephalopathy G93.40    Debility R53.81       Pain: 0/10    Cognitive Treatment    Treatment time:  1354-3271 & 4869-7367    Subjective: [x] Alert [x] Cooperative     [] Confused     [] Agitated    [] Lethargic    Objective/Assessment:  Attention: functional throughout, distractions in room minimized. Recall/Problem solving: Pt. continues to utilize external memory aide. Discussed use of memory scrapbook of pictures of Pt., family members, significant events in Pt's life, etc. to facilitate long term memory. Pt. verbalized understanding and agreeable to this stating enjoys scrapbooking and family can help with this.       Reasoning, ID similarities/differences between 2 items- 90% accuracy anthony, improving to 100% c min cue. Other: No family present. Pt. Reporting notices stuttering recently when excited and engaged in conversation. Education provided re: fluency strategies (e.g., light contacts of articulators, reduced rate). Pt. Verbalized understanding and attempting to use strategies throughout session. Stuttering noted 2x during session consisting of part-word repetitions. Pt. stating would like writer to speak with sister re: compensatory strategies and tasks she can complete at home to target memory. ST to speak with Pt's sister later today. 1500- Spoke with Pt. and Pt's sister (Roland Anderson) on phone re: recommendations and strategies for facilitating long term recall (e.g., use of memory scrapbook of friends/family with image and information about person/scene, use of familiar music to stimulate recall of artist/lyrics/memory with song, use of magazines/newspapers/articles of notable events/people, use of memory journal, describing directions to locations/landmarks on familiar streets, visual/verbal cueing, etc.). Pt's sister had several questions. All questions answered by Nel Arrieta. Pt's sister verbalized understanding of all recommendations and reporting will try different tasks at home with Pt.  Pt's sister stating will contact different family and friends of Pt., have each person write a paragraph of memories with Pt., and compile pictures for scrapbook. Pt. reporting enjoys scrapbooking and looking forward to this. Plan:  [x] Continue ST services    [] Discharge from ST:      Discharge recommendations: [] Inpatient Rehab   [] [facility]   [] Outpatient Therapy  [] Follow up at trauma clinic   [] Other:       Treatment completed by:  Jensen Hernandez M.A., CCC-SLP

## 2021-09-24 NOTE — PROGRESS NOTES
Pulmonary Progress Note  [facility]      Patient - Lisa Huber,  Age - 59 y.o.    - 1960      Room Number - 9495/7580-08   [facility] -  [id number]   [facility] # - [de-identified]  Date of Admission -  2021  7:27 PM        Consulting [address] [facility]*  Primary Care Physician - AFSANEH Mathew - CNP     SUBJECTIVE   Patient seen in dialysis, currently on room air    OBJECTIVE   VITALS    height is 5' 5\" (1.651 m) and weight is 234 lb 12.6 oz (106.5 kg). Her temperature is 96.8 °F (36 °C). Her blood pressure is 121/57 (abnormal) and her pulse is 60. Her respiration is 18 and oxygen saturation is 100%. Body mass index is 39.07 kg/m². Temperature Range: Temp: 96.8 °F (36 °C) Temp  Av.2 °F (36.2 °C)  Min: 96.8 °F (36 °C)  Max: 97.6 °F (36.4 °C)  BP Range:  Systolic (75YFL), ZCB:269 , Min:103 , MYY:523     Diastolic (21JLH), PSD:76, Min:45, Max:66    Pulse Range: Pulse  Av.4  Min: 47  Max: 63  Respiration Range: Resp  Av  Min: 18  Max: 18  Current Pulse Ox[de-identified]  SpO2: 100 %  24HR Pulse Ox Range:  SpO2  Av %  Min: 100 %  Max: 100 %  Oxygen Amount and Delivery: O2 Flow Rate (L/min): 0 L/min    Wt Readings from Last 3 Encounters:   21 234 lb 12.6 oz (106.5 kg)   21 238 lb 1.6 oz (108 kg)   21 236 lb (107 kg)       I/O (24 Hours)  No intake or output data in the 24 hours ending 21 1707    EXAM     General Appearance  Awake, alert, oriented, in no acute distress  HEENT - normocephalic, atraumatic.  []  Mallampati  [x] Crowded airway   [x] Macroglossia  []  Retrognathia  [] Micrognathia  []  Normal tongue size []  Normal Bite  [] Danilo sign positive    Neck - Supple,  trachea midline   Lungs -diminished  Cardiovascular - Heart sounds are normal.  Regular rate and rhythm   Abdomen - Soft, nontender, nondistended, no masses or organomegaly  Neurologic - There are no focal motor or sensory deficits  Skin - No bruising or bleeding  Extremities - No clubbing, cyanosis, edema    MEDS      apixaban  5 mg Oral BID    aspirin  81 mg Oral Daily    atorvastatin  80 mg Oral Daily    busPIRone  7.5 mg Oral TID    carvedilol  6.25 mg Oral BID    febuxostat  40 mg Oral Daily    furosemide  80 mg Oral BID    gabapentin  300 mg Oral BID    insulin glargine  53 Units SubCUTAneous BID    insulin lispro  0-12 Units SubCUTAneous TID WC    insulin lispro  0-6 Units SubCUTAneous Nightly    pantoprazole  40 mg Oral QAM AC    ranolazine  1,000 mg Oral BID    tamsulosin  0.4 mg Oral Daily    traZODone  75 mg Oral Nightly    polyethylene glycol  17 g Oral Daily      sodium chloride      dextrose       sodium chloride, ondansetron **OR** [DISCONTINUED] ondansetron, sodium chloride flush, dextrose, dextrose, glucagon (rDNA), glucose, perflutren lipid microspheres, polyethylene glycol, sodium citrate, sodium citrate, acetaminophen, senna, bisacodyl    LABS   CBC   Recent Labs     09/22/21  0700   WBC 4.9   HGB 11.2*   HCT 33.2*   MCV 95.9   *     BMP:   Lab Results   Component Value Date     09/24/2021    K 4.6 09/24/2021    CL 95 09/24/2021    CO2 27 09/24/2021    BUN 41 09/24/2021    LABALBU 3.4 09/17/2021    CREATININE 3.04 09/24/2021    CALCIUM 9.6 09/24/2021    GFRAA 19 09/24/2021    LABGLOM 16 09/24/2021     ABGs:No results found for: PHART, PO2ART, [id number]   Lab Results   Component Value Date    MODE CONDITION NO LONGER WARRANTS 04/06/2021     Ionized Calcium:  No results found for: IONCA  Magnesium:    Lab Results   Component Value Date    MG 2.0 07/16/2021     Phosphorus:    Lab Results   Component Value Date    PHOS 2.5 09/15/2021        LIVER PROFILE No results for input(s): AST, ALT, LIPASE, BILIDIR, BILITOT, ALKPHOS in the last 72 hours. Invalid input(s): AMYLASE,  ALB  INR No results for input(s): INR in the last 72 hours.   PTT   Lab Results   Component Value Date APTT 42.4 (H) 08/16/2021         RADIOLOGY     (See actual reports for details)    ASSESSMENT/PLAN   Principal Problem:    Encephalopathy  Active Problems:    Chronic diastolic heart failure (HCC)    Diabetic polyneuropathy associated with type 2 diabetes mellitus ([address]    Spinal stenosis of lumbar region with neurogenic claudication    Mixed hyperlipidemia    Stage 4 chronic kidney disease (HCC)    Type 2 diabetes mellitus with kidney complication, with long-term current use of insulin (HCC)    Essential hypertension    Anemia in stage 4 chronic kidney disease ([address]    Acute cerebrovascular accident (CVA) ([address]    Morbid obesity with BMI of 40.0-44.9, adult ([address]    Debility  Resolved Problems:    * No resolved hospital problems. *    Okay to discharge from pulmonary standpoint  She will need to be seen in the office in about 4 to 6 weeks  She will need outpatient sleep study.   She can call [phone] for appointment in the [place] office  Electronically signed by Mine Pablo MD on 9/24/2021 at 5:07 PM

## 2021-09-24 NOTE — PROGRESS NOTES
HEMODIALYSIS PRE-TREATMENT NOTE    Patient Identifiers prior to treatment: name, birthdate and band    Isolation Required:MRSA                    Isolation Type: Contact       (please document if patient is being managed as a PUI/COVID-19 patient)        Hepatitis status:                           Date Drawn                             Result  Hepatitis B Surface Antigen 08/13/2021 neg        Hepatitis B Surface Antibody 08/13/2021 pos     48.51   Hepatitis B Core Antibody 08/13/2021 neg          How was Hepatitis Status verified: labs     Was a copy of the labs you documented provided to facility for the patient's chart: yes    Hemodialysis orders verified: yes    Access Within normal limits ( I.e. s/s of infection,...): yes     Pre-Assessment completed: yes    Pre-dialysis report received from: Melonie Cortesor                      Time: 9407

## 2021-09-25 LAB
GLUCOSE BLD-MCNC: 104 MG/DL (ref 65–105)
GLUCOSE BLD-MCNC: 197 MG/DL (ref 65–105)
GLUCOSE BLD-MCNC: 234 MG/DL (ref 65–105)
GLUCOSE BLD-MCNC: 241 MG/DL (ref 65–105)

## 2021-09-25 PROCEDURE — 6370000000 HC RX 637 (ALT 250 FOR IP): Performed by: FAMILY MEDICINE

## 2021-09-25 PROCEDURE — 82947 ASSAY GLUCOSE BLOOD QUANT: CPT

## 2021-09-25 PROCEDURE — 97129 THER IVNTJ 1ST 15 MIN: CPT

## 2021-09-25 PROCEDURE — 97530 THERAPEUTIC ACTIVITIES: CPT

## 2021-09-25 PROCEDURE — 97110 THERAPEUTIC EXERCISES: CPT

## 2021-09-25 PROCEDURE — 97116 GAIT TRAINING THERAPY: CPT

## 2021-09-25 PROCEDURE — 97130 THER IVNTJ EA ADDL 15 MIN: CPT

## 2021-09-25 PROCEDURE — 6370000000 HC RX 637 (ALT 250 FOR IP): Performed by: PHYSICAL MEDICINE & REHABILITATION

## 2021-09-25 PROCEDURE — 97535 SELF CARE MNGMENT TRAINING: CPT

## 2021-09-25 PROCEDURE — 99231 SBSQ HOSP IP/OBS SF/LOW 25: CPT | Performed by: STUDENT IN AN ORGANIZED HEALTH CARE EDUCATION/TRAINING PROGRAM

## 2021-09-25 PROCEDURE — 1180000000 HC REHAB R&B

## 2021-09-25 RX ORDER — ASPIRIN 81 MG/1
81 TABLET, CHEWABLE ORAL DAILY
Qty: 30 TABLET | Refills: 0 | Status: SHIPPED | OUTPATIENT
Start: 2021-09-25

## 2021-09-25 RX ORDER — PEN NEEDLE, DIABETIC 29 G X1/2"
NEEDLE, DISPOSABLE MISCELLANEOUS
Qty: 200 EACH | Refills: 0 | Status: SHIPPED | OUTPATIENT
Start: 2021-09-25 | End: 2021-11-23

## 2021-09-25 RX ORDER — INSULIN GLARGINE 100 [IU]/ML
53 INJECTION, SOLUTION SUBCUTANEOUS 2 TIMES DAILY
Qty: 10 PEN | Refills: 0 | Status: ON HOLD | OUTPATIENT
Start: 2021-09-25 | End: 2021-11-22 | Stop reason: HOSPADM

## 2021-09-25 RX ORDER — GABAPENTIN 300 MG/1
300 CAPSULE ORAL 2 TIMES DAILY
Qty: 60 CAPSULE | Refills: 0 | Status: ON HOLD | OUTPATIENT
Start: 2021-09-25 | End: 2021-11-22

## 2021-09-25 RX ORDER — RANOLAZINE 1000 MG/1
1000 TABLET, EXTENDED RELEASE ORAL 2 TIMES DAILY
Qty: 60 TABLET | Refills: 0 | Status: SHIPPED | OUTPATIENT
Start: 2021-09-25 | End: 2022-09-27 | Stop reason: SDUPTHER

## 2021-09-25 RX ORDER — FUROSEMIDE 80 MG
80 TABLET ORAL 2 TIMES DAILY
Qty: 60 TABLET | Refills: 0 | Status: ON HOLD | OUTPATIENT
Start: 2021-09-25 | End: 2021-11-22 | Stop reason: SDUPTHER

## 2021-09-25 RX ORDER — FEBUXOSTAT 40 MG/1
40 TABLET, FILM COATED ORAL DAILY
Qty: 30 TABLET | Refills: 0 | Status: ON HOLD | OUTPATIENT
Start: 2021-09-25 | End: 2021-11-22 | Stop reason: SDUPTHER

## 2021-09-25 RX ORDER — ATORVASTATIN CALCIUM 80 MG/1
80 TABLET, FILM COATED ORAL DAILY
Qty: 30 TABLET | Refills: 0 | Status: SHIPPED | OUTPATIENT
Start: 2021-09-25 | End: 2021-11-03 | Stop reason: SDUPTHER

## 2021-09-25 RX ORDER — CARVEDILOL 6.25 MG/1
6.25 TABLET ORAL 2 TIMES DAILY
Qty: 60 TABLET | Refills: 0 | Status: SHIPPED | OUTPATIENT
Start: 2021-09-25 | End: 2022-04-13 | Stop reason: DRUGHIGH

## 2021-09-25 RX ORDER — TRAZODONE HYDROCHLORIDE 50 MG/1
75 TABLET ORAL NIGHTLY
Qty: 45 TABLET | Refills: 0 | Status: ON HOLD | OUTPATIENT
Start: 2021-09-25 | End: 2021-11-22 | Stop reason: SDUPTHER

## 2021-09-25 RX ORDER — BUSPIRONE HYDROCHLORIDE 7.5 MG/1
7.5 TABLET ORAL 3 TIMES DAILY
Qty: 90 TABLET | Refills: 0 | Status: ON HOLD | OUTPATIENT
Start: 2021-09-25 | End: 2022-06-28 | Stop reason: HOSPADM

## 2021-09-25 RX ORDER — APIXABAN 5 MG/1
5 TABLET, FILM COATED ORAL 2 TIMES DAILY
Qty: 60 TABLET | Refills: 0 | Status: ON HOLD | OUTPATIENT
Start: 2021-09-25 | End: 2021-11-22 | Stop reason: SDUPTHER

## 2021-09-25 RX ORDER — PANTOPRAZOLE SODIUM 40 MG/1
40 TABLET, DELAYED RELEASE ORAL
Qty: 30 TABLET | Refills: 0 | Status: SHIPPED | OUTPATIENT
Start: 2021-09-25 | End: 2022-09-27 | Stop reason: SDUPTHER

## 2021-09-25 RX ORDER — INSULIN LISPRO 100 [IU]/ML
INJECTION, SOLUTION INTRAVENOUS; SUBCUTANEOUS
Qty: 3 PEN | Refills: 0 | Status: ON HOLD | OUTPATIENT
Start: 2021-09-25 | End: 2021-11-22 | Stop reason: HOSPADM

## 2021-09-25 RX ORDER — TAMSULOSIN HYDROCHLORIDE 0.4 MG/1
0.4 CAPSULE ORAL DAILY
Qty: 30 CAPSULE | Refills: 0 | Status: ON HOLD | OUTPATIENT
Start: 2021-09-25 | End: 2021-11-10 | Stop reason: SDUPTHER

## 2021-09-25 RX ADMIN — CARVEDILOL 6.25 MG: 6.25 TABLET, FILM COATED ORAL at 07:20

## 2021-09-25 RX ADMIN — BUSPIRONE HYDROCHLORIDE 7.5 MG: 7.5 TABLET ORAL at 20:58

## 2021-09-25 RX ADMIN — TAMSULOSIN HYDROCHLORIDE 0.4 MG: 0.4 CAPSULE ORAL at 07:21

## 2021-09-25 RX ADMIN — GABAPENTIN 300 MG: 300 CAPSULE ORAL at 07:21

## 2021-09-25 RX ADMIN — CARVEDILOL 6.25 MG: 6.25 TABLET, FILM COATED ORAL at 20:58

## 2021-09-25 RX ADMIN — SENNOSIDES 17.2 MG: 8.6 TABLET, COATED ORAL at 21:04

## 2021-09-25 RX ADMIN — INSULIN GLARGINE 53 UNITS: 100 INJECTION, SOLUTION SUBCUTANEOUS at 07:28

## 2021-09-25 RX ADMIN — RANOLAZINE 1000 MG: 1000 TABLET, FILM COATED, EXTENDED RELEASE ORAL at 20:59

## 2021-09-25 RX ADMIN — TRAZODONE HYDROCHLORIDE 75 MG: 150 TABLET ORAL at 20:56

## 2021-09-25 RX ADMIN — ATORVASTATIN CALCIUM 80 MG: 80 TABLET, FILM COATED ORAL at 07:21

## 2021-09-25 RX ADMIN — INSULIN LISPRO 2 UNITS: 100 INJECTION, SOLUTION INTRAVENOUS; SUBCUTANEOUS at 21:09

## 2021-09-25 RX ADMIN — INSULIN LISPRO 2 UNITS: 100 INJECTION, SOLUTION INTRAVENOUS; SUBCUTANEOUS at 17:33

## 2021-09-25 RX ADMIN — INSULIN LISPRO 4 UNITS: 100 INJECTION, SOLUTION INTRAVENOUS; SUBCUTANEOUS at 11:26

## 2021-09-25 RX ADMIN — SENNOSIDES 17.2 MG: 8.6 TABLET, COATED ORAL at 07:32

## 2021-09-25 RX ADMIN — ACETAMINOPHEN 650 MG: 325 TABLET, FILM COATED ORAL at 21:01

## 2021-09-25 RX ADMIN — ASPIRIN 81 MG CHEWABLE TABLET 81 MG: 81 TABLET CHEWABLE at 07:20

## 2021-09-25 RX ADMIN — APIXABAN 5 MG: 5 TABLET, FILM COATED ORAL at 20:58

## 2021-09-25 RX ADMIN — GABAPENTIN 300 MG: 300 CAPSULE ORAL at 21:00

## 2021-09-25 RX ADMIN — FUROSEMIDE 80 MG: 40 TABLET ORAL at 17:33

## 2021-09-25 RX ADMIN — PANTOPRAZOLE SODIUM 40 MG: 40 TABLET, DELAYED RELEASE ORAL at 06:16

## 2021-09-25 RX ADMIN — RANOLAZINE 1000 MG: 1000 TABLET, FILM COATED, EXTENDED RELEASE ORAL at 07:23

## 2021-09-25 RX ADMIN — INSULIN GLARGINE 53 UNITS: 100 INJECTION, SOLUTION SUBCUTANEOUS at 21:09

## 2021-09-25 RX ADMIN — BUSPIRONE HYDROCHLORIDE 7.5 MG: 7.5 TABLET ORAL at 13:51

## 2021-09-25 RX ADMIN — FUROSEMIDE 80 MG: 40 TABLET ORAL at 07:21

## 2021-09-25 RX ADMIN — FEBUXOSTAT 40 MG: 40 TABLET, FILM COATED ORAL at 07:23

## 2021-09-25 RX ADMIN — APIXABAN 5 MG: 5 TABLET, FILM COATED ORAL at 07:21

## 2021-09-25 RX ADMIN — BUSPIRONE HYDROCHLORIDE 7.5 MG: 7.5 TABLET ORAL at 07:23

## 2021-09-25 RX ADMIN — POLYETHYLENE GLYCOL 3350 17 G: 17 POWDER, FOR SOLUTION ORAL at 07:21

## 2021-09-25 ASSESSMENT — PAIN SCALES - GENERAL: PAINLEVEL_OUTOF10: 4

## 2021-09-25 NOTE — PLAN OF CARE
Problem: Falls - Risk of:  Goal: Will remain free from falls  Description: Will remain free from falls  9/25/2021 1509 by Ophelia Morrell RN  Outcome: Ongoing  9/25/2021 0438 by Amna Coyne RN  Outcome: Ongoing  Goal: Absence of physical injury  Description: Absence of physical injury  9/25/2021 1509 by Ophelia Morrell RN  Outcome: Ongoing  9/25/2021 0438 by Amna Coyne RN  Outcome: Ongoing     Problem: Skin Integrity:  Goal: Will show no infection signs and symptoms  Description: Will show no infection signs and symptoms  9/25/2021 1509 by Ophelia Morrell RN  Outcome: Ongoing  9/25/2021 0438 by Amna Coyne RN  Outcome: Ongoing  Goal: Absence of new skin breakdown  Description: Absence of new skin breakdown  9/25/2021 1509 by Ophelia Morrell RN  Outcome: Ongoing  9/25/2021 0438 by Amna Coyne RN  Outcome: Ongoing     Problem: SAFETY  Goal: LTG - Patient will demonstrate safety requirements appropriate to situation/environment  Outcome: Ongoing  Goal: LTG - patient will utilize safety techniques  Outcome: Ongoing  Goal: STG - patient locks brakes on wheelchair  Outcome: Ongoing  Goal: STG - Patient uses call light consistently to request assistance with transfers  Outcome: Ongoing     Problem: Musculor/Skeletal Functional Status  Goal: Highest potential functional level  9/25/2021 1509 by Ophelia Morrell RN  Outcome: Ongoing  9/25/2021 0438 by Amna Coyne RN  Outcome: Ongoing  Goal: Absence of falls  9/25/2021 1509 by Ophelia Morrell RN  Outcome: Ongoing  9/25/2021 0438 by Amna Coyne RN  Outcome: Ongoing     Problem: Pain:  Goal: Pain level will decrease  Description: Pain level will decrease  9/25/2021 1509 by Ophelia Morrell RN  Outcome: Ongoing  9/25/2021 0438 by Amna Coyne RN  Outcome: Ongoing  Goal: Control of acute pain  Description: Control of acute pain  9/25/2021 1509 by Ophelia Morrell RN  Outcome: Ongoing  9/25/2021 0438 by Amna Coyne RN  Outcome: Ongoing  Goal: Control of chronic pain  Description: Control of chronic pain  9/25/2021 1509 by Patrizia Mckeon RN  Outcome: Ongoing  9/25/2021 0438 by Stefan Rosado RN  Outcome: Ongoing     Problem: Nutrition  Goal: Optimal nutrition therapy  Outcome: Ongoing     Problem: Musculor/Skeletal Functional Status  Goal: Highest potential functional level  9/25/2021 1509 by Patrizia Mckeon RN  Outcome: Ongoing  9/25/2021 0438 by Stefan Rosado RN  Outcome: Ongoing  Goal: Absence of falls  9/25/2021 1509 by Patrizia Mckeon RN  Outcome: Ongoing  9/25/2021 0438 by Stefan Rosado RN  Outcome: Ongoing

## 2021-09-25 NOTE — PROGRESS NOTES
Physical Therapy  Facility/Department: [facility] ACUTE REHAB  Daily Treatment Note  NAME: Aleksandar Hoang  : 1960  MRN: [id number]    Date of Service: 2021    Discharge Recommendations:  Patient would benefit from continued therapy after discharge, Home with assist PRN   PT Equipment Recommendations  Equipment Needed: No  Other: Pt has rollator at home    Assessment   Body structures, Functions, Activity limitations: Decreased functional mobility ; Decreased strength;Decreased endurance;Decreased safe awareness;Decreased balance;Decreased cognition  Treatment Diagnosis: impaired mobility due to weakness and balance issues  Specific instructions for Next Treatment: Monitor sao2 with activity. instruct in strengthening and balance activities, instruct in standing balance and transfers/ gait  as LE strength improves  History: admitted due to acute encephalopathy  REQUIRES PT FOLLOW UP: Yes  Activity Tolerance  Activity Tolerance: Patient limited by fatigue;Patient limited by endurance; Other     Patient Diagnosis(es): There were no encounter diagnoses. has a past medical history of Backache, unspecified, CHF (congestive heart failure) ([facility] [address] Chronic kidney disease, Coronary atherosclerosis of artery bypass graft, Cramp of limb, Gallstones, Hypertension, Insomnia, Pneumonia, Type II or unspecified type diabetes mellitus with renal manifestations, not stated as uncontrolled(250.40), Type II or unspecified type diabetes mellitus without mention of complication, not stated as uncontrolled, and Unspecified vitamin D deficiency. has a past surgical history that includes Coronary artery bypass graft; Knee arthroscopy; Carpal tunnel release; Breast surgery; Tonsillectomy; Hand surgery; Ankle fracture surgery; Cholecystectomy, open (N/A); and IR TUNNELED CVC PLACE WO SQ PORT/PUMP > 5 YEARS (2021).     Restrictions  Restrictions/Precautions  Restrictions/Precautions: Fall Risk, General Precautions  Required Braces Orientation  Orientation  Overall Orientation Status: Within Functional Limits  Cognition      Objective      Transfers  Sit to Stand: Stand by assistance  Stand to sit: Stand by assistance  Bed to Chair: Stand by assistance  Stand Pivot Transfers: Stand by assistance  Comment: BUE using rollator with transfers  Ambulation  Ambulation?: Yes  Ambulation 1  Surface: level tile;uneven  Device: Rollator  Assistance: Stand by assistance  Quality of Gait: slow, seated rest break before completing amb. Gait Deviations: Slow Annie; Increased ELISABET; Decreased step length;Decreased step height  Distance: 300 ft  Comments: 2 minute walk test. pt amb 200' in 2 minutes and required a long rest break before continuing to amb last 100'  Ambulation 2  Surface - 2: outdoors;uneven  Device 2: Rollator  Assistance 2: Stand by assistance  Quality of Gait 2: same as above  Gait Deviations: Decreased step length;Decreased step height;Decreased arm swing  Distance: 120'  Stairs/Curb  Stairs?: Yes  Stairs  # Steps : 9  Stairs Height: 8\"  Rails: Bilateral  Device: No Device  Assistance: Stand by assistance  Comment: no LOB; patient able to complete without seated rest break      Balance  Posture: Fair  Sitting - Static: Good  Sitting - Dynamic: Fair;+  Standing - Static: Good  Standing - Dynamic: Fair;+  Comments: Standing balance completed with rollator.    Other exercises  Other exercises?: Yes  Other exercises 1: seated B LE exercises x20 reps with #2.5 and blue tband   Other exercises 2: Nustep L3 5'  Other exercises 3: 2MWT: 200' (Long rest break needed upon completion of 2MWT)         Comment: Breaks given PRN              G-Code     OutComes Score                                                     AM-PAC Score             Goals  Short term goals  Time Frame for Short term goals: 5 days  Short term goal 1: pt to tolerate 1/2 hour to 45 minutes for therapuetic exercise and activity  Short term goal 2: pt to demonstrate good technique for LE strengthening and  balance activities  Short term goal 3: pt to demonstrate  independent bed mobility with out bed rail. Short term goal 4: pt to demonstrate safe tech for transfers from varies surfaces, at supervsion level  Short term goal 5: pt able to ambulate with rollator distance fo 120 ft or more, SBA, level surfaces  Short term goal 6: pt able to go up and down 3 to 5 steps with 2 rails CGA to improve strength and endurance. Short term goal 7: pt to demonstrate goobalance during gait with rollator. Long term goals  Time Frame for Long term goals : By DC  Long term goal 1: pt able to demonstrate mod-I transfers  Long term goal 2:  pt able to ambulate safely on level as well as carpetted/outside terraine with rollator, atleast distance of 150 ft, mod-I  Long term goal 3: pt able to go up and down 5 steps with2 rails , SBA to improve endurance. Long term goal 4: Improve gait speed by demonstrating ability to ambulate 150 ft with rollator in 2 minutes to improve fucntion. Long term goal 5: Improve R LE strength by 1/3 MMG to improve fucntion. Patient Goals   Patient goals : get better    Plan    Plan  Times per week: 900 minutes/week for combined therapy of PT/OT/ST due to decreased tolerance to activity and Hemodialysis. Specific instructions for Next Treatment: Monitor sao2 with activity. instruct in strengthening and balance activities, instruct in standing balance and transfers/ gait  as LE strength improves  Current Treatment Recommendations: Strengthening, Functional Mobility Training, Equipment Evaluation, Education, & procurement, Safety Education & Training, Gait Training, Transfer Training, Balance Training, Endurance Training, Positioning, Patient/Caregiver Education & Training, Stair training, Neuromuscular Re-education  Safety Devices  Type of devices:  All fall risk precautions in place, Gait belt, Patient at risk for falls        09/25/21 0942 09/25/21 1220   PT Individual Minutes Time In 0942 1220   Time Out 1037 1255   Minutes 54 [address], [place]

## 2021-09-25 NOTE — PROGRESS NOTES
Speech Language Pathology  Speech Language Pathology  [facility] Acute Rehab Unit at [facility]  Cognitive/Speech/Language  Treatment Note    Date: 9/25/2021  Patients Name: Altagracia Palafox  MRN: [id number]  Diagnosis: Cognitive impairment   Patient Active Problem List   Diagnosis Code    Coronary artery disease I25.10    Chronic diastolic heart failure (HCC) I50.32    Diabetic polyneuropathy associated with type 2 diabetes mellitus (HCC) E11.42    History of coronary artery bypass graft Z95.1    Iron deficiency anemia D50.9    Spinal stenosis of lumbar region with neurogenic claudication M48.062    Mixed hyperlipidemia E78.2    Stage 4 chronic kidney disease (HCC) N18.4    Type 2 diabetes mellitus with kidney complication, with long-term current use of insulin ([facility]) E11.29, Z79.4    Syncope and collapse R55    Obesity, Class II, BMI 35-39.9 E66.9    Thyroid nodule greater than or equal to 1 cm in diameter incidentally noted on imaging study E04.1    Essential hypertension I10    Anemia in stage 4 chronic kidney disease (HCC) N18.4, D63.1    Chronic ischemic heart disease I25.9    Acute cerebrovascular accident (CVA) ([facility] [address] I63.9    Stroke-like symptoms R29.90    Morbid obesity with BMI of 40.0-44.9, adult ([facility]) E66.01, Z68.41    Acute encephalopathy G93.40    Encephalopathy G93.40    Debility R53.81       Pain: 0/10    Cognitive Treatment    Treatment time:  7545-6484    Subjective: [x] Alert [x] Cooperative     [] Confused     [] Agitated    [] Lethargic    Objective/Assessment:  Attention: functional throughout, distractions in room minimized. Recall/Problem solving: Pt. continues to utilize external memory aide. Discussed several recommendations and strategies for facilitating long-term recall at home (e.g., scrapbook of friends/famimly, use of familiar music, use of magazines/newspapers/articles, memory journal, description of locations/landmarks, etc.).   Handout of recommendations and strategies reviewed and given to Pt. Pt. verbalized understanding of all information provided. Problem solving, household problems- 100% accuracy anthony. Responding to orientation past/present questions (yes/no questions)- 80% accuracy anthony, 100% cued. Pt. reporting recalls information for orientation task from watching television recently. Convergent categorization, fill in letters- 90% accuracy anthony, 100% cued. Discussed use of music (Pt. Preference for 80's music) and use of [employer] or [employer] to facilitate recall. Trained Pt. To search 80's hits on [employer] Pt. Able to do so independently following model. Pt. Recalling different artists and songs from [address] [place] [address].         Other: No family present. Plan for anticipated d/c tomorrow. Handouts of memory tasks and recommendations reviewed and given to Pt. Pt. Verbalized understanding and reporting will continue to practice and use trained strategies at home. Recommending continued ST following d/c.         Plan:  [x] Continue ST services    [] Discharge from ST:      Discharge recommendations: [] Inpatient Rehab   [] [facility]   [x] Outpatient Therapy  [] Follow up at trauma clinic   [x] Other: Home health       Treatment completed by:  Mela Alejandra M.A., CCC-SLP

## 2021-09-25 NOTE — PROGRESS NOTES
[facility]    ACUTE REHABILITATION OCCUPATIONAL THERAPY  DAILY NOTE    Date: 21  Patient Name: Vanessa Huang      Room: 1944/8991-88    MRN: [id number]   : 1960  (59 y.o.)  Gender: female   Referring Practitioner: Jhon Cabrera MD/Dr. Colleen Curtis  Diagnosis: Acute encephalopathy, thought to be secondary to hypoperfusion Recurrence of right-sided stroke symptoms, admit post syncope event during dialysis with AURELIA on CKI. Additional Pertinent Hx: d/c from  acute rehab here on 21 post treat for CVA with R weak. BLE lymphedema -pt notes per GP not to use pumps from home currently. pt to bring in comp garments and brandon. Per PM&R note: Vanessa Huang is a 59 y.o. right-handed female with history of recent CVA, AURELIA on CKD (on hemodialysis), CAD, CHF, atrial fibrillation, HTN, type 2 diabetes admitted to [facility] on 2021. She initially presented following a syncopal episode in dialysis, after which she was reportedly confused. She has reported right lower limb weakness and heaviness. Imaging of the brain has been unremarkable. EEG showed mild encephalopathy with no epileptiform activity. Symptoms are thought to be secondary to hypoperfusion. Pulmonology has been consulted for new oxygen requirement overnight. She currently reports ongoing impaired memory and right-sided numbness/tingling and weakness.     Restrictions  Restrictions/Precautions: Fall Risk, General Precautions  Implants present? :  (R upper chest port)  Other position/activity restrictions: up w/ assist, Hemodialysis M / W / F  Required Braces or Orthoses  Right Lower Extremity Brace:  (Lymphedema wraps)  Left Lower Extremity Brace:  (Lymphedema wraps)  Required Braces or Orthoses?: Yes    Subjective  Subjective: \"I just miss my family, I want all those memories back\"  Comments: Pt tearful at times when talking of her memory loss, but was encouraged by some memories she was able to share about pictures/items in her room. Patient Currently in Pain: Denies  Restrictions/Precautions: Fall Risk;General Precautions  Overall Orientation Status: Within Functional Limits    Objective  Cognition  Overall Cognitive Status: Impaired  Attention Span: Attends with cues to redirect  Memory: Decreased long term memory;Decreased short term memory (Pt reports some improvement)  Safety Judgement: Good awareness of safety precautions  Insights: Fully aware of deficits  Perception  Overall Perceptual Status: WFL (Pt easily distracted but initiated all tasks on her own.)  Balance  Sitting Balance: Independent  Standing Balance: Supervision  Bed mobility  Supine to Sit: Modified independent (HOB elevated)  Scooting: Independent (at EOB)  Comment: Pt left up in bedside chair at end of session. Transfers  Stand Step Transfers: Supervision  Stand Pivot Transfers: Supervision  Sit to stand: Supervision  Stand to sit: Supervision  Transfer Comments: Distant supervision for all mobility/transfers with use of 4WW, Good safety and no LOB noted. Pt did forget to lock her brakes one time, but self corrected and remained steady. Functional Mobility  Functional - Mobility Device: 4-Wheeled Walker  Activity: To/from bathroom  Assist Level: Supervision  Functional Mobility Comments: Distant supervision, Good safety, no LOB  Toilet Transfers  Toilet - Technique: Ambulating  Equipment Used: Grab bars  Toilet Transfer: Supervision  Toilet Transfers Comments: with [id number]     Vision - Basic Assessment  Patient Visual Report: No visual complaint reported. Vision  Patient Visual Report: No visual complaint reported. ADL  Equipment Provided: Reacher;Sock aid  Feeding: Modified independent   Grooming: Modified independent  (Seated on rollator at sink)  UE Bathing: Setup (Seated on rollator at sink)  LE Bathing: None (Not addressed this date - plans to shower tomorrow)  UE Dressing: Setup  LE Dressing: Minimal assistance (A for TEDs;  Other tasks with set-up/supervision)  Toileting: Supervision  Additional Comments: Pt sat EOB and donned slipper socks with set-up and sock-aid. Pt then ambulated into BR with 4WW and completed toilet transfer/toileting with distant supervision. Pt then positioned herself in front of sink and sat on 4WW to complete grooming/UB bathing. Pt declined LB bathing, reporting that she would really like to take a shower tomorrow before discharging home. Pt ambulated to EOB with 4WW and donned [place] shirt/shorts. Pt able to doff slipper socks with use of reacher. OT assisted with AGUSTIN's and then pt able to don tennis shoes. Assessment  Performance deficits / Impairments: Decreased ADL status; Decreased functional mobility ; Decreased ROM; Decreased strength;Decreased safe awareness;Decreased cognition;Decreased endurance;Decreased balance;Decreased high-level IADLs;Decreased fine motor control;Decreased coordination  Prognosis: Good  Discharge Recommendations: Patient would benefit from continued therapy after discharge  Activity Tolerance: Patient Tolerated treatment well  Activity Tolerance: No c/o pain; Some reports of fatigue from a poor night's sleep but tolerated session with no increased c/o  Safety Devices in place: Yes  Type of devices: Patient at risk for falls; Left in chair;Call light within reach    Patient Education:  Patient Goals   Patient goals : return home safe and indep with self care     Plan  Plan  Times per week: Plan Of Care:  Due to impaired functional endurance and/or medical issues, the patient is to be seen for a combined total of at least a  900 minutes over 7 days of Physical, Occupational and/or Speech Therapy.   Times per day: Twice a day  Current Treatment Recommendations: Self-Care / ADL, Strengthening, ROM, Balance Training, Functional Mobility Training, Endurance Training, Neuromuscular Re-education, Cognitive Reorientation, Safety Education & Training, Patient/Caregiver Education & Training,

## 2021-09-25 NOTE — PLAN OF CARE
Problem: Falls - Risk of:  Goal: Will remain free from falls  Description: Will remain free from falls  Outcome: Ongoing  Goal: Absence of physical injury  Description: Absence of physical injury  Outcome: Ongoing     Problem: Skin Integrity:  Goal: Will show no infection signs and symptoms  Description: Will show no infection signs and symptoms  Outcome: Ongoing  Goal: Absence of new skin breakdown  Description: Absence of new skin breakdown  Outcome: Ongoing     Problem: Musculor/Skeletal Functional Status  Goal: Highest potential functional level  Outcome: Ongoing  Goal: Absence of falls  Outcome: Ongoing     Problem: Pain:  Goal: Pain level will decrease  Description: Pain level will decrease  Outcome: Ongoing  Goal: Control of acute pain  Description: Control of acute pain  Outcome: Ongoing  Goal: Control of chronic pain  Description: Control of chronic pain  Outcome: Ongoing     Problem: Musculor/Skeletal Functional Status  Goal: Highest potential functional level  Outcome: Ongoing  Goal: Absence of falls  Outcome: Ongoing

## 2021-09-25 NOTE — PROGRESS NOTES
NEPHROLOGY PROGRESS NOTE    Patient :  Francisco Farris; 59 y.o. MRN# [id number]  Location:  4946/7842-22  Attending:  Lorrie Huber Date:  9/17/2021   Hospital Day: 8      Reason for Consult: Acute kidney injury dialysis dependent      Chief Complaint: Confusion  History Obtained From: Patient    History of Present Illness: This is a 59 y.o. female with past medical history of longstanding type 2 diabetes insulin-dependent diabetes mellitus, essential hypertension, coronary artery disease status post CABG in 2004, coronary artery stent in 2019, CHF with EF of 55%, history of mild to moderate LVH diastolic dysfunction, recent history of acute/subacute stroke in left frontal lobe with right-sided weakness, patient developed acute kidney injury due to contrast-induced nephropathy requiring dialysis since August 2021  Patient currently receives dialysis Monday Wednesday Friday under Dr. Maris Wolfe at [facility]. Patient presented from dialysis with altered mental status. She apparently had transient loss of consciousness but unclear the duration and was confused. She has numbness over the right lower extremity and cannot feel the right side of her body which is new. Patient had MRI of the brain which showed no acute changes. MRA of the neck showed no hemodynamically significant stenosis identified. Patient transferred to acute rehab on 9/17/21.   Nephrologist consulted for continuation of dialysis    Subjective/interval history:    Patient seen and examined, no acute events overnight  Patient had dialysis yesterday, had cramps during dialysis  Blood pressure stable        Past Medical History:        Diagnosis Date    Backache, unspecified     CHF (congestive heart failure) (HCC)     Chronic kidney disease     Coronary atherosclerosis of artery bypass graft     Cramp of limb     Gallstones     Hypertension     Insomnia     Pneumonia     Type II or unspecified type diabetes mellitus with renal manifestations, not stated as uncontrolled(250.40)     Type II or unspecified type diabetes mellitus without mention of complication, not stated as uncontrolled     Unspecified vitamin D deficiency        Past Surgical History:        Procedure Laterality Date    ANKLE FRACTURE SURGERY      BREAST SURGERY      CARPAL TUNNEL RELEASE      CHOLECYSTECTOMY, OPEN N/A     CORONARY ARTERY BYPASS GRAFT      x3    HAND SURGERY      IR TUNNELED CATHETER PLACEMENT GREATER THAN 5 YEARS  8/18/2021    IR TUNNELED CATHETER PLACEMENT GREATER THAN 5 YEARS 8/18/2021 [facility] PROCEDURES    KNEE ARTHROSCOPY      right    TONSILLECTOMY         Current Medications:    0.9 % sodium chloride infusion, PRN  ondansetron (ZOFRAN-ODT) disintegrating tablet 4 mg, Q8H PRN  sodium chloride flush 0.9 % injection 5-40 mL, PRN  apixaban (ELIQUIS) tablet 5 mg, BID  aspirin chewable tablet 81 mg, Daily  atorvastatin (LIPITOR) tablet 80 mg, Daily  busPIRone (BUSPAR) tablet 7.5 mg, TID  carvedilol (COREG) tablet 6.25 mg, BID  dextrose 5 % solution, PRN  dextrose 50 % IV solution, PRN  febuxostat (ULORIC) tablet 40 mg, Daily  furosemide (LASIX) tablet 80 mg, BID  gabapentin (NEURONTIN) capsule 300 mg, BID  glucagon (rDNA) injection 1 mg, PRN  glucose (GLUTOSE) 40 % oral gel 15 g, PRN  insulin glargine (LANTUS) injection vial 53 Units, BID  insulin lispro (HUMALOG) injection vial 0-12 Units, TID WC  insulin lispro (HUMALOG) injection vial 0-6 Units, Nightly  pantoprazole (PROTONIX) tablet 40 mg, QAM AC  perflutren lipid microspheres (DEFINITY) injection 2.2 mg, ONCE PRN  polyethylene glycol (GLYCOLAX) packet 17 g, Daily PRN  ranolazine (RANEXA) extended release tablet 1,000 mg, BID  tamsulosin (FLOMAX) capsule 0.4 mg, Daily  traZODone (DESYREL) tablet 75 mg, Nightly  acetaminophen (TYLENOL) tablet 650 mg, Q4H PRN  polyethylene glycol (GLYCOLAX) packet 17 g, Daily  senna (SENOKOT) tablet 17.2 mg, Daily PRN  bisacodyl (DULCOLAX) suppository 10 mg, Daily PRN        Allergies:  Adhesive tape, Ace inhibitors, Iv dye [iodides], and Metformin and related    Social History:   Social History     Socioeconomic History    Marital status: Single     Spouse name: Not on file    Number of children: Not on file    Years of education: Not on file    Highest education level: Not on file   Occupational History    Not on file   Tobacco Use    Smoking status: Never Smoker    Smokeless tobacco: Never Used   Substance and Sexual Activity    Alcohol use: No    Drug use: No    Sexual activity: Not Currently   Other Topics Concern    Not on file   Social History Narrative    Not on file     Social Determinants of Health     Financial Resource Strain: Low Risk     Difficulty of Paying Living Expenses: Not hard at all   Food Insecurity:     Worried About [address] in the Last Year:     [address] [employer] in the Last Year:    Transportation Needs:     Lack of Transportation (Medical):      Lack of Transportation (Non-Medical):    Physical Activity:     Days of Exercise per Week:     Minutes of Exercise per Session:    Stress:     Feeling of Stress :    Social Connections:     Frequency of Communication with Friends and Family:     Frequency of Social Gatherings with Friends and Family:     Attends [employer] Services:     Active Member of Clubs or Organizations:     Attends Club or Organization Meetings:     Marital Status:    Intimate Partner Violence:     Fear of Current or Ex-Partner:     Emotionally Abused:     Physically Abused:     Sexually Abused:            Objective:  CURRENT TEMPERATURE:  Temp: 97.5 °F (36.4 °C)  MAXIMUM TEMPERATURE OVER 24HRS:  Temp (24hrs), Av.3 °F (36.3 °C), Min:96.8 °F (36 °C), Max:97.6 °F (36.4 °C)    CURRENT RESPIRATORY RATE:  Resp: 16  CURRENT PULSE:  Pulse: 61  CURRENT BLOOD PRESSURE:  BP: (!) 148/47  24HR BLOOD PRESSURE RANGE:  Systolic (53HYZ), IEI:905 , Min:103 , CCF:094   ; Diastolic (24hrs), Av, Min:45, Max:69    24HR INTAKE/OUTPUT:    No intake or output data in the 24 hours ending 21 1119  Patient Vitals for the past 96 hrs (Last 3 readings):   Weight   21 1812 232 lb 9.4 oz (105.5 kg)   21 0740 234 lb 12.6 oz (106.5 kg)   21 1759 234 lb 12.6 oz (106.5 kg)       Physical Exam:  GENERAL APPEARANCE: Alert and cooperative, and appears to be in no acute distress. HEAD: normocephalic    CARDIAC: Normal S1 and S2. No S3, S4 or murmurs. Rhythm is regular. LUNGS: Clear to auscultation and percussion without rales, rhonchi, wheezing or diminished breath sounds. NECK: Neck supple, non-tender without lymphadenopathy, masses or thyromegaly. JVD-neg  BACK: Examination of the spine reveals normal gait and posture, no spinal deformity, symmetry of spinal muscles, without tenderness, decreased range of motion or muscular spasm  MUSKULOSKELETAL: Adequately aligned spine. No joint erythema or tenderness. EXTREMITIES: No edema. Peripheral pulses intact. NEURO: Right-sided weakness and numbness    Labs:      Radiology:  Reviewed as available. Assessment:  1. AURELIA secondary to ATN, dialysis dependent on hemodialysis   2. Essential hypertension  3. History of CVA  4. Type 2 diabetes insulin-dependent diabetes mellitus       Plan:  1. Hemodialysis , HD monday as per orders  2. [facility]       Thank you for the consultation.       Electronically signed by Glenn Sutherland MD on 2021 at 11:19 AM

## 2021-09-25 NOTE — PROGRESS NOTES
Physical Medicine & Rehabilitation  Progress Note      Subjective:      Jamel Clayton is a 59 y.o. female with encephalopathy, recrudescence of right-sided stroke symptoms. She reports doing well today. She states that she is ready for discharge tomorrow, although she feels a little bit nervous about it. She notes that she did a lot in therapy today and that her right lower limb feels fatigued. She states that her strength seems to be improving overall. She denies any other acute concerns. ROS:  Denies fevers, chills, sweats. No chest pain, palpitations, lightheadedness. Denies coughing, wheezing or shortness of breath. Denies abdominal pain, nausea, diarrhea or constipation. No new areas of joint pain. Denies new areas of numbness or weakness. Denies new anxiety or depression issues. No new skin problems. Rehabilitation:   Progressing in therapies. PT:  Restrictions/Precautions: Fall Risk, General Precautions  Implants present? :  (R upper chest port)  Other position/activity restrictions: up w/ assist, Hemodialysis M / W / F  Required Braces or Orthoses  Right Lower Extremity Brace:  (Lymphedema wraps)  Left Lower Extremity Brace:  (Lymphedema wraps)   Transfers  Sit to Stand: Stand by assistance  Stand to sit: Stand by assistance  Bed to Chair: Stand by assistance  Stand Pivot Transfers: Stand by assistance  Comment: BUE using rollator with transfers  Ambulation 1  Surface: level tile, uneven  Device: Rollator  Assistance: Stand by assistance  Quality of Gait: slow, seated rest break before completing amb.   Gait Deviations: Slow Annie, Increased ELISABET, Decreased step length, Decreased step height  Distance: 300 ft  Comments: 2 minute walk test. pt amb 200' in 2 minutes and required a long rest break before continuing to amb last 100'    Transfers  Sit to Stand: Stand by assistance  Stand to sit: Stand by assistance  Bed to Chair: Stand by assistance  Stand Pivot Transfers: Stand by assistance  Comment: BUE using rollator with transfers  Ambulation  Ambulation?: Yes  More Ambulation?: No  Ambulation 1  Surface: level tile, uneven  Device: Rollator  Assistance: Stand by assistance  Quality of Gait: slow, seated rest break before completing amb. Gait Deviations: Slow Annie, Increased ELISABET, Decreased step length, Decreased step height  Distance: 300 ft  Comments: 2 minute walk test. pt amb 200' in 2 minutes and required a long rest break before continuing to amb last 100'    Surface: level tile, uneven  Ambulation 1  Surface: level tile, uneven  Device: Rollator  Assistance: Stand by assistance  Quality of Gait: slow, seated rest break before completing amb. Gait Deviations: Slow Annie, Increased ELISABET, Decreased step length, Decreased step height  Distance: 300 ft  Comments: 2 minute walk test. pt amb 200' in 2 minutes and required a long rest break before continuing to amb last 100'    OT:  ADL  Equipment Provided: Reacher, Sock aid  Feeding: Modified independent   Grooming: Modified independent  (Seated on rollator at sink)  UE Bathing: Setup (Seated on rollator at sink)  LE Bathing: None (Not addressed this date - plans to shower tomorrow)  UE Dressing: Setup  LE Dressing: Minimal assistance (A for TEDs; Other tasks with set-up/supervision)  Toileting: Supervision  Additional Comments: Pt sat EOB and donned slipper socks with set-up and sock-aid. Pt then ambulated into BR with 4WW and completed toilet transfer/toileting with distant supervision. Pt then positioned herself in front of sink and sat on 4WW to complete grooming/UB bathing. Pt declined LB bathing, reporting that she would really like to take a shower tomorrow before discharging home. Pt ambulated to EOB with 4WW and donned OH shirt/shorts. Pt able to doff slipper socks with use of reacher. OT assisted with AGUSTIN's and then pt able to don tennis shoes.    Instrumental ADL's  Instrumental ADLs: Yes     Balance  Sitting Balance: Independent  Standing Balance: Supervision   Standing Balance  Time: Am: 1-2 min x4  Activity: AM: ADL set up, care tasks, functional mobility in room   Comment: limited standing tolerance due to onset fatigue and dizziness. Functional Mobility  Functional - Mobility Device: 4-Wheeled Walker  Activity: To/from bathroom  Assist Level: Supervision  Functional Mobility Comments: Distant supervision, Good safety, no LOB     Bed mobility  Rolling to Left: Stand by assistance  Rolling to Right: Stand by assistance  Supine to Sit: Modified independent (HOB elevated)  Sit to Supine: Supervision  Scooting: Independent (at EOB)  Comment: Pt left up in bedside chair at end of session. Transfers  Stand Step Transfers: Supervision  Stand Pivot Transfers: Supervision  Sit to stand: Supervision  Stand to sit: Supervision  Transfer Comments: Distant supervision for all mobility/transfers with use of 4WW, Good safety and no LOB noted. Pt did forget to lock her brakes one time, but self corrected and remained steady. Toilet Transfers  Toilet - Technique: Ambulating  Equipment Used: Grab bars  Toilet Transfer: Supervision  Toilet Transfers Comments: with 4WW     Shower Transfers  Shower - Transfer From: Rosa Santana (3ZX)  Shower - Transfer Type: To and From  Shower - Transfer To: Shower seat with back  Shower - Technique: Ambulating  Shower Transfers: Stand by assistance  Shower Transfers Comments: grab bar for supported prn when stepping in/out without 4WW. SPEECH:  Subjective: [x]? Alert     [x]? Cooperative     []? Confused     []? Agitated    []? Lethargic     Objective/Assessment:  Attention: functional throughout, distractions in room minimized.         Recall/Problem solving: Pt. continues to utilize external memory aide.   Discussed several recommendations and strategies for facilitating long-term recall at home (e.g., scrapbook of friends/famimly, use of familiar music, use of magazines/newspapers/articles, memory journal, description of locations/landmarks, etc.). Handout of recommendations and strategies reviewed and given to Pt. Pt. verbalized understanding of all information provided.      Problem solving, household problems- 100% accuracy anthony. Responding to orientation past/present questions (yes/no questions)- 80% accuracy anthony, 100% cued. Pt. reporting recalls information for orientation task from watching television recently. Convergent categorization, fill in letters- 90% accuracy anthony, 100% cued.      Discussed use of music (Pt. Preference for 80's music) and use of [employer] or [employer] [place] [employer] to facilitate recall. Trained Pt. To search 80's hits on [employer]. Pt. Able to do so independently following model. Pt. Recalling different artists and songs from [facility] [address] Replaced by [facility].          Other: No family present. Plan for anticipated d/c tomorrow. Handouts of memory tasks and recommendations reviewed and given to Pt. Pt. Verbalized understanding and reporting will continue to practice and use trained strategies at home.       Recommending continued ST following d/c.         Current Medications:   Current Facility-Administered Medications: 0.9 % sodium chloride infusion, 25 mL, IntraVENous, PRN  ondansetron (ZOFRAN-ODT) disintegrating tablet 4 mg, 4 mg, Oral, Q8H PRN **OR** [DISCONTINUED] ondansetron (ZOFRAN) injection 4 mg, 4 mg, IntraVENous, Q6H PRN  sodium chloride flush 0.9 % injection 5-40 mL, 5-40 mL, IntraVENous, PRN  apixaban (ELIQUIS) tablet 5 mg, 5 mg, Oral, BID  aspirin chewable tablet 81 mg, 81 mg, Oral, Daily  atorvastatin (LIPITOR) tablet 80 mg, 80 mg, Oral, Daily  busPIRone (BUSPAR) tablet 7.5 mg, 7.5 mg, Oral, TID  carvedilol (COREG) tablet 6.25 mg, 6.25 mg, Oral, BID  dextrose 5 % solution, 100 mL/hr, IntraVENous, PRN  dextrose 50 % IV solution, 12.5 g, IntraVENous, PRN  febuxostat (ULORIC) tablet 40 mg, 40 mg, Oral, Daily  furosemide (LASIX) tablet 80 mg, 80 mg, Oral, BID  gabapentin (NEURONTIN) capsule 300 mg, 300 mg, Oral, BID  glucagon (rDNA) injection 1 mg, 1 mg, IntraMUSCular, PRN  glucose (GLUTOSE) 40 % oral gel 15 g, 15 g, Oral, PRN  insulin glargine (LANTUS) injection vial 53 Units, 53 Units, SubCUTAneous, BID  insulin lispro (HUMALOG) injection vial 0-12 Units, 0-12 Units, SubCUTAneous, TID WC  insulin lispro (HUMALOG) injection vial 0-6 Units, 0-6 Units, SubCUTAneous, Nightly  pantoprazole (PROTONIX) tablet 40 mg, 40 mg, Oral, QAM AC  perflutren lipid microspheres (DEFINITY) injection 2.2 mg, 2 mL, IntraVENous, ONCE PRN  polyethylene glycol (GLYCOLAX) packet 17 g, 17 g, Oral, Daily PRN  ranolazine (RANEXA) extended release tablet 1,000 mg, 1,000 mg, Oral, BID  tamsulosin (FLOMAX) capsule 0.4 mg, 0.4 mg, Oral, Daily  traZODone (DESYREL) tablet 75 mg, 75 mg, Oral, Nightly  acetaminophen (TYLENOL) tablet 650 mg, 650 mg, Oral, Q4H PRN  polyethylene glycol (GLYCOLAX) packet 17 g, 17 g, Oral, Daily  senna (SENOKOT) tablet 17.2 mg, 2 tablet, Oral, Daily PRN  bisacodyl (DULCOLAX) suppository 10 mg, 10 mg, Rectal, Daily PRN      Objective:  BP (!) 157/46   Pulse 65   Temp 97.5 °F (36.4 °C) (Oral)   Resp 18   Ht 5' 5\" (1.651 m)   Wt 232 lb 9.4 oz (105.5 kg)   SpO2 99%   BMI 38.70 kg/m²       GEN: Well developed, well nourished, no acute distress  HEENT: NCAT. EOM grossly intact. Hearing grossly intact. Mucous membranes pink and moist.  RESP: Normal breath sounds with no wheezing, rales, or rhonchi. Respirations WNL and unlabored. CV: Regular rate and rhythm. No murmurs, rubs, or gallops. ABD: Soft, non-distended, BS+ and equal.  NEURO: Alert. Speech fluent. Impaired memory (primarily long-term) - improving. MSK:  Muscle bulk is normal bilaterally. Strength 4+/5 in right upper limb. Strength 5/5 in left upper limb. Strength 5/5 with bilateral ankle dorsiflexion and plantarflexion. Has difficulty lifting right lower limb off of chair against gravity compared to left.   LIMBS:  Edema present in bilateral lower limbs - stable. SKIN: Warm and dry with good turgor. PSYCH: Mood WNL. Affect WNL. Appropriately interactive. Diagnostics:     CBC:   No results for input(s): WBC, RBC, HGB, HCT, MCV, RDW, PLT in the last 72 hours. BMP:   Recent Labs     09/24/21  0724   *   K 4.6   CL 95*   CO2 27   BUN 41*   CREATININE 3.04*   GLUCOSE 142*     BNP: No results for input(s): BNP in the last 72 hours. PT/INR: No results for input(s): PROTIME, INR in the last 72 hours. APTT: No results for input(s): APTT in the last 72 hours. CARDIAC ENZYMES: No results for input(s): CKMB, CKMBINDEX, TROPONINT in the last 72 hours. Invalid input(s): CKTOTAL;3  FASTING LIPID PANEL:  Lab Results   Component Value Date    CHOL 105 04/09/2015    HDL 43 04/09/2015    TRIG 168 04/09/2015     LIVER PROFILE: No results for input(s): AST, ALT, ALB, BILIDIR, BILITOT, ALKPHOS in the last 72 hours. Impression/Plan:   Impaired ADLs, gait, and mobility due to:    1. Encephalopathy:  Thought to be secondary to hypoperfusion. SLP for cognition/memory. 2. Recrudescence of right-sided stroke symptoms: In the setting of recent CVA. PT/OT  for gait, mobility, strengthening, endurance, ADLs, and self care. On aspirin, atorvastatin. 3. Anemia:  Hemoglobin 11.2 on 9/22, stable. Monitoring. On iron supplementation - discontinued 9/21, as patient is refusing due to constipation with iron supplement. 4. Thrombocytopenia:  Platelets 991 on 7/29, stable. Monitoring. 5. AURELIA on CKD: On hemodialysis MWF. Nephrology following. 6. CAD:  On aspirin, atorvastatin, ranexa  7. Atrial fibrillation:  On eliquis, carvedilol  8. HTN:  On carvedilol, furosemide  9. Type 2 diabetes with neuropathy:  On lantus, humalog sliding scale. On gabapentin  10. Anxiety: On buspirone TID  11. Insomnia:  On trazodone nightly  12. Gout:  On febuxostat  13. Obesity  14. Bowel Management: Miralax daily, senokot prn, dulcolax prn. 15.  DVT prophylaxis:  On eliquis  16.  Internal Medicine for medical management      Electronically signed by Renay Sherman MD on 9/25/2021 at 11:08 PM

## 2021-09-26 VITALS
TEMPERATURE: 97.3 F | HEIGHT: 65 IN | DIASTOLIC BLOOD PRESSURE: 52 MMHG | HEART RATE: 62 BPM | OXYGEN SATURATION: 97 % | SYSTOLIC BLOOD PRESSURE: 146 MMHG | WEIGHT: 232.59 LBS | RESPIRATION RATE: 16 BRPM | BODY MASS INDEX: 38.75 KG/M2

## 2021-09-26 LAB
GLUCOSE BLD-MCNC: 138 MG/DL (ref 65–105)
GLUCOSE BLD-MCNC: 292 MG/DL (ref 65–105)

## 2021-09-26 PROCEDURE — 6370000000 HC RX 637 (ALT 250 FOR IP): Performed by: PHYSICAL MEDICINE & REHABILITATION

## 2021-09-26 PROCEDURE — 99238 HOSP IP/OBS DSCHRG MGMT 30/<: CPT | Performed by: STUDENT IN AN ORGANIZED HEALTH CARE EDUCATION/TRAINING PROGRAM

## 2021-09-26 PROCEDURE — 97535 SELF CARE MNGMENT TRAINING: CPT

## 2021-09-26 PROCEDURE — 97110 THERAPEUTIC EXERCISES: CPT

## 2021-09-26 PROCEDURE — 82947 ASSAY GLUCOSE BLOOD QUANT: CPT

## 2021-09-26 PROCEDURE — 6370000000 HC RX 637 (ALT 250 FOR IP): Performed by: FAMILY MEDICINE

## 2021-09-26 PROCEDURE — 97116 GAIT TRAINING THERAPY: CPT

## 2021-09-26 RX ADMIN — FEBUXOSTAT 40 MG: 40 TABLET, FILM COATED ORAL at 07:31

## 2021-09-26 RX ADMIN — CARVEDILOL 6.25 MG: 6.25 TABLET, FILM COATED ORAL at 07:28

## 2021-09-26 RX ADMIN — POLYETHYLENE GLYCOL 3350 17 G: 17 POWDER, FOR SOLUTION ORAL at 07:28

## 2021-09-26 RX ADMIN — APIXABAN 5 MG: 5 TABLET, FILM COATED ORAL at 07:28

## 2021-09-26 RX ADMIN — SENNOSIDES 17.2 MG: 8.6 TABLET, COATED ORAL at 07:32

## 2021-09-26 RX ADMIN — ACETAMINOPHEN 650 MG: 325 TABLET, FILM COATED ORAL at 06:18

## 2021-09-26 RX ADMIN — RANOLAZINE 1000 MG: 1000 TABLET, FILM COATED, EXTENDED RELEASE ORAL at 07:31

## 2021-09-26 RX ADMIN — GABAPENTIN 300 MG: 300 CAPSULE ORAL at 07:28

## 2021-09-26 RX ADMIN — ATORVASTATIN CALCIUM 80 MG: 80 TABLET, FILM COATED ORAL at 07:27

## 2021-09-26 RX ADMIN — ASPIRIN 81 MG CHEWABLE TABLET 81 MG: 81 TABLET CHEWABLE at 07:27

## 2021-09-26 RX ADMIN — INSULIN LISPRO 6 UNITS: 100 INJECTION, SOLUTION INTRAVENOUS; SUBCUTANEOUS at 11:31

## 2021-09-26 RX ADMIN — TAMSULOSIN HYDROCHLORIDE 0.4 MG: 0.4 CAPSULE ORAL at 07:28

## 2021-09-26 RX ADMIN — INSULIN GLARGINE 53 UNITS: 100 INJECTION, SOLUTION SUBCUTANEOUS at 07:23

## 2021-09-26 RX ADMIN — BUSPIRONE HYDROCHLORIDE 7.5 MG: 7.5 TABLET ORAL at 07:31

## 2021-09-26 RX ADMIN — PANTOPRAZOLE SODIUM 40 MG: 40 TABLET, DELAYED RELEASE ORAL at 06:12

## 2021-09-26 RX ADMIN — FUROSEMIDE 80 MG: 40 TABLET ORAL at 07:28

## 2021-09-26 ASSESSMENT — PAIN SCALES - GENERAL
PAINLEVEL_OUTOF10: 2
PAINLEVEL_OUTOF10: 4

## 2021-09-26 NOTE — PROGRESS NOTES
[facility]    ACUTE REHABILITATION OCCUPATIONAL THERAPY  DAILY NOTE    Date: 21  Patient Name: Jonn Marsh      Room: 0363/6324-01    MRN: [id number]   : 1960  (59 y.o.)  Gender: female   Referring Practitioner: Bety Bass MD/Dr. Georgi Muñoz  Diagnosis: Acute encephalopathy, thought to be secondary to hypoperfusion Recurrence of right-sided stroke symptoms, admit post syncope event during dialysis with AURELIA on CKI. Additional Pertinent Hx: d/c from  acute rehab here on 21 post treat for CVA with R weak. BLE lymphedema -pt notes per GP not to use pumps from home currently. pt to bring in comp garments and brandon. Per PM&R note: Jonn Marsh is a 59 y.o. right-handed female with history of recent CVA, AURELIA on CKD (on hemodialysis), CAD, CHF, atrial fibrillation, HTN, type 2 diabetes admitted to [facility] on 2021. She initially presented following a syncopal episode in dialysis, after which she was reportedly confused. She has reported right lower limb weakness and heaviness. Imaging of the brain has been unremarkable. EEG showed mild encephalopathy with no epileptiform activity. Symptoms are thought to be secondary to hypoperfusion. Pulmonology has been consulted for new oxygen requirement overnight. She currently reports ongoing impaired memory and right-sided numbness/tingling and weakness. Restrictions  Restrictions/Precautions: Fall Risk, General Precautions  Implants present? :  (R upper chest port)  Other position/activity restrictions: up w/ assist, Hemodialysis M / W / F  Required Braces or Orthoses  Right Lower Extremity Brace:  (Lymphedema wraps)  Left Lower Extremity Brace:  (Lymphedema wraps)  Required Braces or Orthoses?: Yes    Subjective  Subjective: \"My sister will help me with that\"  Comments: Referring to donning compression stockings, as well as thorough washing of buttocks.   Patient Currently in Pain: Denies  Restrictions/Precautions: Fall Risk;General Precautions  Overall Orientation Status: Within Functional Limits    Objective  Cognition  Overall Cognitive Status: Impaired  Attention Span: Attends with cues to redirect  Safety Judgement: Decreased awareness of need for safety (Transferred without walker at end of session)  Awareness of Errors: Decreased awareness of errors  Perception  Overall Perceptual Status: WFL  Balance  Sitting Balance: Independent  Standing Balance: Modified independent   Transfers  Stand Step Transfers: Supervision (Mod I with 4WW or GB; SUP when not using device)  Stand Pivot Transfers: Modified independent  Sit to stand: Modified independent  Stand to sit: Modified independent  Transfer Comments: Pt Mod I for transfers when using appropriate DME, however at end of session pt transferred from w/c at end of bed to chair at head of bed without walker. Pt required BUE support on bed and was unstable but did not lose her balance or require physical A.  OT encouraged pt to always use her walker when transferring at home and pt verbalized understanding. Toilet Transfers  Toilet - Technique: Stand pivot  Equipment Used: Grab bars  Toilet Transfer: Modified independent  Toilet Transfers Comments: Pivot from w/c  Shower Transfers  Shower - Transfer Type: To and From  Shower - Transfer To: Transfer tub bench  Shower - Technique: Ambulating  Shower Transfers: Supervision  Shower Transfers Comments: Distant supervision     Vision - Basic Assessment  Patient Visual Report: No visual complaint reported. Vision  Patient Visual Report: No visual complaint reported.   ADL  Feeding: Independent  Grooming: Modified independent  (w/c level at sink)  UE Bathing: Modified independent  (Seated on TTB in shower)  LE Bathing: Minimal assistance (A for thoroughness w/buttocks)  UE Dressing: Modified independent   LE Dressing: Minimal assistance (A for AGUSTIN's)  Toileting: Modified independent   Additional of Physical, Occupational and/or Speech Therapy. Times per day: Twice a day  Current Treatment Recommendations: Self-Care / ADL, Strengthening, ROM, Balance Training, Functional Mobility Training, Endurance Training, Neuromuscular Re-education, Cognitive Reorientation, Safety Education & Training, Patient/Caregiver Education & Training, Equipment Evaluation, Education, & procurement, Home Management Training, Cognitive/Perceptual Training  Patient Goals   Patient goals : return home safe and indep with self care  Short term goals  Time Frame for Short term goals: 1 week  Short term goal 1: pt to use BUE to cut food successfully and feed self with RUE mod indep. Short term goal 2: SBA ambulate to obtain set up for adls. Short term goal 3: SBA LE bathe and dress, able to don compression stockings with own device with min assist  Short term goal 4: ilana 9-11 min stand, ambulate with SBA and good safety for adls  Short term goal 5: ilana 10 reps x 3 R shld AROM achieving 95 degrees, ilana 10 reps x 3 R hand  strengthening. Long term goals  Time Frame for Long term goals : by discharge  Long term goal 1: mod indep ambulate to obtain set up for adls ([address])  Long term goal 2: mod indep self care and toileting  Long term goal 3: tolerate 15-17 min stand, ambulate for adls mod indep with good safety. Long term goal 4: indep RUE HEP  Long term goal 5: increase R shoulder AROM to 100 and MMT to 3+ and R  to 20 pounds to improve use of RUE for adls.     Time In: 1031  Time Out:  1134  Total minutes:  63    Electronically signed by Amee Payton on 9/26/21 at 2:56 PM EDT

## 2021-09-26 NOTE — PLAN OF CARE
Problem: Falls - Risk of:  Goal: Will remain free from falls  Description: Will remain free from falls  9/26/2021 1125 by Esteban Trevino RN  Outcome: Completed  9/26/2021 0236 by Eric De Luna RN  Outcome: Met This Shift  Goal: Absence of physical injury  Description: Absence of physical injury  9/26/2021 1125 by Esteban Trevino RN  Outcome: Completed  9/26/2021 0236 by Eric De Luna RN  Outcome: Met This Shift     Problem: Skin Integrity:  Goal: Will show no infection signs and symptoms  Description: Will show no infection signs and symptoms  9/26/2021 1125 by Esteban Trevino RN  Outcome: Completed  9/26/2021 0236 by Eric De Luna RN  Outcome: Met This Shift  Goal: Absence of new skin breakdown  Description: Absence of new skin breakdown  9/26/2021 1125 by Esteban Trevino RN  Outcome: Completed  9/26/2021 0236 by Eric De Luna RN  Outcome: Met This Shift     Problem: SAFETY  Goal: LTG - Patient will demonstrate safety requirements appropriate to situation/environment  9/26/2021 1125 by Esteban Trevino RN  Outcome: Completed  9/26/2021 0236 by Eric De Luna RN  Outcome: Met This Shift  Goal: LTG - patient will utilize safety techniques  9/26/2021 1125 by Esteban Trevino RN  Outcome: Completed  9/26/2021 0236 by Eric De Luna RN  Outcome: Met This Shift  Goal: STG - patient locks brakes on wheelchair  9/26/2021 1125 by Esteban Trevino RN  Outcome: Completed  9/26/2021 0236 by Eric De Luna RN  Outcome: Met This Shift  Goal: STG - Patient uses call light consistently to request assistance with transfers  9/26/2021 1125 by Esteban Trevino RN  Outcome: Completed  9/26/2021 0236 by Eric De Luna RN  Outcome: Met This Shift     Problem: Musculor/Skeletal Functional Status  Goal: Highest potential functional level  9/26/2021 1125 by Esteban Trevino RN  Outcome: Completed  9/26/2021 0236 by Eric De Luna RN  Outcome: Ongoing  Goal: Absence of falls  9/26/2021 1125 by Ophelia Morrell RN  Outcome: Completed  9/26/2021 0236 by Vanna Albarado RN  Outcome: Met This Shift     Problem: Pain:  Goal: Pain level will decrease  Description: Pain level will decrease  9/26/2021 1125 by Ophelia Morrell RN  Outcome: Completed  9/26/2021 0236 by Vanna Albarado RN  Outcome: Met This Shift  Goal: Control of acute pain  Description: Control of acute pain  9/26/2021 1125 by Ophelia Morrell RN  Outcome: Completed  9/26/2021 0236 by Vanna Albarado RN  Outcome: Met This Shift  Goal: Control of chronic pain  Description: Control of chronic pain  9/26/2021 1125 by Ophelia Morrell RN  Outcome: Completed  9/26/2021 0236 by Vanna Albarado RN  Outcome: Met This Shift     Problem: Nutrition  Goal: Optimal nutrition therapy  9/26/2021 1125 by Ophelia Morrell RN  Outcome: Completed  9/26/2021 0236 by Vanna Albarado RN  Outcome: Met This Shift     Problem: Musculor/Skeletal Functional Status  Goal: Highest potential functional level  9/26/2021 1125 by Ophelia Morrell RN  Outcome: Completed  9/26/2021 0236 by Vanna Albarado RN  Outcome: Ongoing  Goal: Absence of falls  9/26/2021 1125 by Ophelia Morrell RN  Outcome: Completed  9/26/2021 0236 by Vanna Albarado RN  Outcome: Met This Shift

## 2021-09-26 NOTE — DISCHARGE SUMMARY
(hungry/hot/pain)  Social Interaction: 7 - Patient has appropriate behavior/relations 100% of the time  Problem Solvin - Patient able to solve simple/routine tasks  Memory: 4 - Patient remembers 75-90%+ of the time      Inpatient Rehabilitation Course:   Ian Pacheco is a 59 y.o. female admitted to inpatient rehabilitation on 2021. She was originally admitted to [facility] on 21. She initially presented following a syncopal episode in dialysis, after which she was reportedly confused.  She reported right lower limb weakness and heaviness.  Imaging of the brain was unremarkable.  EEG showed mild encephalopathy with no epileptiform activity.  Symptoms were thought to be secondary to hypoperfusion.  Pulmonology was consulted for new oxygen requirement.     She was requiring assistance for self-care activities and mobility prompting admission to acute rehab. Rehab course: The patient participated in an aggressive multidisciplinary inpatient rehabilitation program involving 900 minutes of rehabilitation over 7 days. Patient benefited from inpatient rehab and was discharged in good stable condition. She was maintained on aspirin and atorvastatin. She continued with hemodialysis per her normal [facility] schedule. She was weaned off of nasal cannula oxygen and was doing well on room air. Chronic conditions were otherwise stable on medications. Patient evaluated today:  GEN: Well developed, well nourished, no acute distress  HEENT: NCAT. EOM grossly intact. Hearing grossly intact. Mucous membranes pink and moist.  RESP: Normal breath sounds with no wheezing, rales, or rhonchi. Respirations WNL and unlabored. CV: Regular rate and rhythm. No murmurs, rubs, or gallops. ABD: Soft, non-distended, BS+ and equal.  NEURO: Alert. Speech fluent. Impaired memory (primarily long-term) - improving. MSK:  Muscle bulk is normal bilaterally. Strength 4+/5 in right upper limb.   Strength 5/5 in left upper limb. Strength 4+/5 in bilateral lower limbs. LIMBS:  Edema present in bilateral lower limbs - stable. SKIN: Warm and dry with good turgor. PSYCH: Mood WNL. Affect WNL. Appropriately interactive. Consults:   nephrology and family medicine    Significant Diagnostics:   CBC:   Lab Results   Component Value Date    WBC 4.9 09/22/2021    RBC 3.46 09/22/2021    HGB 11.2 09/22/2021    HCT 33.2 09/22/2021    MCV 95.9 09/22/2021    MCH 32.5 09/22/2021    MCHC 33.8 09/22/2021    RDW 14.2 09/22/2021     09/22/2021    MPV 8.7 09/22/2021     BMP:    Lab Results   Component Value Date     09/24/2021    K 4.6 09/24/2021    CL 95 09/24/2021    CO2 27 09/24/2021    BUN 41 09/24/2021    LABALBU 3.4 09/17/2021    CREATININE 3.04 09/24/2021    CALCIUM 9.6 09/24/2021    GFRAA 19 09/24/2021    LABGLOM 16 09/24/2021    GLUCOSE 142 09/24/2021         No orders to display         Patient Instructions:    No driving until cleared by a physician    Medications, precautions and follow up reviewed with patient and family    Follow-up visits: See after visit summary from hospitalization  PCP 9/30/21, Dr. Victorina Vernon 10/28/21, Dr. Akilah Moreira 9/29/21 or 9/30/21, Dr. Jennifer Fernandez 11/12/21    Disposition:  Home with home health      Discharge Medications:   Guero Coronado, Umesh [facility] Medication Instructions CKK:[id number]    Printed on:09/26/21 6274   Medication Information                      acetaminophen (TYLENOL) 325 MG tablet  Take 2 tablets by mouth every 4 hours as needed for Pain             Alcohol Swabs 70 % PADS  Use an alcohol swab to cleanse the skin before checking your blood sugar and before each insulin injection.              aspirin 81 MG chewable tablet  Take 1 tablet by mouth daily             atorvastatin (LIPITOR) 80 MG tablet  Take 1 tablet by mouth daily             blood glucose test strips (DEN CONTOUR TEST) strip  1 each by In Vitro route 3 times daily             busPIRone (BUSPAR) 7.5 MG tablet  Take 1 tablet by mouth 3 times daily             carvedilol (COREG) 6.25 MG tablet  Take 1 tablet by mouth 2 times daily             ELIQUIS 5 MG TABS tablet  Take 1 tablet by mouth 2 times daily             febuxostat (ULORIC) 40 MG TABS tablet  Take 1 tablet by mouth daily             ferrous sulfate (BRIAN-ARCHIE) 325 (65 Fe) MG tablet  Take 1 tablet by mouth 2 times daily (with meals)             furosemide (LASIX) 80 MG tablet  Take 1 tablet by mouth 2 times daily             gabapentin (NEURONTIN) 300 MG capsule  Take 1 capsule by mouth 2 times daily for 30 days. insulin glargine (BASAGLAR KWIKPEN) 100 UNIT/ML injection pen  Inject 53 Units into the skin 2 times daily             insulin lispro, 1 Unit Dial, (HUMALOG KWIKPEN) 100 UNIT/ML SOPN  Per sliding scale:  If <139  No Insulin; 140-199  2 Units; 200-249  4 Units; 250-299  6 Units; 300-349  8 Units; 350-400  10 Units; Above 400  12 Units             Insulin Pen Needle (BD ULTRA-FINE PEN NEEDLES) 29G X 12.7MM MISC  Use one needle for each insulin injection. Lancets 28G MISC  Inject 1 each into the skin 3 times daily.              pantoprazole (PROTONIX) 40 MG tablet  Take 1 tablet by mouth every morning (before breakfast)             polyethylene glycol (GLYCOLAX) 17 g packet  Take 17 g by mouth daily as needed for Constipation             ranolazine (RANEXA) 1000 MG extended release tablet  Take 1 tablet by mouth 2 times daily             senna (SENOKOT) 8.6 MG tablet  Take 2 tablets by mouth daily as needed (Constipation)             sharps container  1 each by Does not apply route as needed (dirty pen needles)             tamsulosin (FLOMAX) 0.4 MG capsule  Take 1 capsule by mouth daily             traZODone (DESYREL) 50 MG tablet  Take 1.5 tablets by mouth reza Ovalle MD

## 2021-09-26 NOTE — PROGRESS NOTES
Physical Therapy  Facility/Department: [facility] ACUTE REHAB  Daily Treatment Note  NAME: Jorge Renae  : 1960  MRN: [id number]    Date of Service: 2021    Discharge Recommendations:  Patient would benefit from continued therapy after discharge, Home with assist PRN   PT Equipment Recommendations  Equipment Needed: No  Other: Pt has rollator at home    Assessment   Body structures, Functions, Activity limitations: Decreased functional mobility ; Decreased strength;Decreased endurance;Decreased safe awareness;Decreased balance;Decreased cognition  Specific instructions for Next Treatment: Monitor sao2 with activity. instruct in strengthening and balance activities, instruct in standing balance and transfers/ gait  as LE strength improves  REQUIRES PT FOLLOW UP: Yes  Activity Tolerance  Activity Tolerance: Patient limited by fatigue;Patient limited by endurance; Other     Patient Diagnosis(es): Diagnoses of Coronary artery disease involving native coronary artery of native heart without angina pectoris, Spinal stenosis of lumbar region with neurogenic claudication, Chronic midline low back pain with bilateral sciatica, Mixed hyperlipidemia, and Type 2 diabetes 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, with long-term current use of insulin ([address] were pertinent to this visit. has a past medical history of Backache, unspecified, CHF (congestive heart failure) ([address] Chronic kidney disease, Coronary atherosclerosis of artery bypass graft, Cramp of limb, Gallstones, Hypertension, Insomnia, Pneumonia, Type II or unspecified type diabetes mellitus with renal manifestations, not stated as uncontrolled(250.40), Type II or unspecified type diabetes mellitus without mention of complication, not stated as uncontrolled, and Unspecified vitamin D deficiency. has a past surgical history that includes Coronary artery bypass graft; Knee arthroscopy; Carpal tunnel release; Breast surgery;  Tonsillectomy; Hand surgery; Ankle fracture surgery; Cholecystectomy, open (N/A); and IR TUNNELED CVC PLACE WO SQ PORT/PUMP > 5 YEARS (8/18/2021). Restrictions  Restrictions/Precautions  Restrictions/Precautions: Fall Risk, General Precautions  Required Braces or Orthoses?: Yes  Implants present? :  (R upper chest port)  Required Braces or Orthoses  Right Lower Extremity Brace:  (lymphedema wraps)  Left Lower Extremity Brace:  (lymphedema wraps)  Position Activity Restriction  Other position/activity restrictions: up w/ assist, Hemodialysis M / W / F  Subjective   General  Chart Reviewed: Yes  Additional Pertinent Hx: Per PM&R note: Sunny Rivera is a 59 y.o. right-handed female with history of recent CVA, AURELIA on CKD (on hemodialysis), CAD, CHF, atrial fibrillation, HTN, type 2 diabetes admitted to [facility] on 9/13/2021. She initially presented following a syncopal episode in dialysis, after which she was reportedly confused. She has reported right lower limb weakness and heaviness. Imaging of the brain has been unremarkable. EEG showed mild encephalopathy with no epileptiform activity. Symptoms are thought to be secondary to hypoperfusion. Pulmonology has been consulted for new oxygen requirement overnight. She currently reports ongoing impaired memory and right-sided numbness/tingling and weakness. She thinks that symptoms are improving a little bit in the right upper limb. She also reports intermittent headache and dizziness. She notes shortness of breath with activity and decreased urine output as well. Pt admitted to rehab unit on 9/17/21  Response To Previous Treatment: Patient with no complaints from previous session.   Family / Caregiver Present: No  Referring Practitioner: Dr. Troy Simon: Pt very pleasant and excited to be discharged today  Pain Screening  Patient Currently in Pain: Denies  Vital Signs  Patient Currently in Pain: Denies  Oxygen Therapy  SpO2: 97 %  Pulse Oximeter Device Mode: Intermittent  Pulse Oximeter Device Location: Finger  O2 Device: None (Room air)       Orientation  Orientation  Overall Orientation Status: Within Functional Limits  Cognition      Objective      Transfers  Sit to Stand: Stand by assistance  Stand to sit: Stand by assistance  Bed to Chair: Stand by assistance  Stand Pivot Transfers: Stand by assistance  Ambulation  Ambulation?: Yes  Ambulation 1  Surface: level tile;ramp  Device: Rollator  Assistance: Stand by assistance  Quality of Gait: slow, seated rest break before completing amb. Gait Deviations: Slow Annie; Increased ELISABET; Decreased step length;Decreased step height  Distance: 283'  Comments: seated rest break at mid point of ambulation  Stairs/Curb  Stairs?: No     Balance  Posture: Fair  Sitting - Static: Good  Sitting - Dynamic: Fair;+  Standing - Static: Good  Standing - Dynamic: Fair;+  Comments: Standing balance assessed with rollator  Other exercises  Other exercises?: Yes  Other exercises 1: seated B LE exercises x20 reps with #2.5 and blue tband   Other exercises 2: HEP handout and explaination of exercises before d/c         Comment: Breaks given PRN              G-Code     OutComes Score                                                     AM-PAC Score             Goals  Short term goals  Time Frame for Short term goals: 5 days  Short term goal 1: pt to tolerate 1/2 hour to 45 minutes for therapuetic exercise and activity  Short term goal 2: pt to demonstrate good technique for LE strengthening and  balance activities  Short term goal 3: pt to demonstrate  independent bed mobility with out bed rail. Short term goal 4: pt to demonstrate safe tech for transfers from varies surfaces, at supervsion level  Short term goal 5: pt able to ambulate with rollator distance fo 120 ft or more, SBA, level surfaces  Short term goal 6: pt able to go up and down 3 to 5 steps with 2 rails CGA to improve strength and endurance.   Short term goal 7: pt to demonstrate goobalance during gait with rollator. Long term goals  Time Frame for Long term goals : By DC  Long term goal 1: pt able to demonstrate mod-I transfers  Long term goal 2:  pt able to ambulate safely on level as well as carpetted/outside terraine with rollator, atleast distance of 150 ft, mod-I  Long term goal 3: pt able to go up and down 5 steps with2 rails , SBA to improve endurance. Long term goal 4: Improve gait speed by demonstrating ability to ambulate 150 ft with rollator in 2 minutes to improve fucntion. Long term goal 5: Improve R LE strength by 1/3 MMG to improve fucntion. Patient Goals   Patient goals : get better    Plan    Plan  Times per week: 900 minutes/week for combined therapy of PT/OT/ST due to decreased tolerance to activity and Hemodialysis. Specific instructions for Next Treatment: Monitor sao2 with activity. instruct in strengthening and balance activities, instruct in standing balance and transfers/ gait  as LE strength improves  Current Treatment Recommendations: Strengthening, Functional Mobility Training, Equipment Evaluation, Education, & procurement, Safety Education & Training, Gait Training, Transfer Training, Balance Training, Endurance Training, Positioning, Patient/Caregiver Education & Training, Stair training, Neuromuscular Re-education  Safety Devices  Type of devices:  All fall risk precautions in place, Gait belt, Patient at risk for falls     Therapy Time   Individual Concurrent Group Co-treatment   Time In 0930         Time Out 1020         Minutes 50                 Clarke Mcknight PTA

## 2021-09-26 NOTE — PROGRESS NOTES
Discharge instructions explained to and given to pt and her 2 sisters, all voiced understanding. All pt belongings home with pt. Pt discharged to home per T[place] AGUILERA per w/c via private vehicle with her sisters. Stable upon discharge.

## 2021-09-30 PROBLEM — F41.9 ANXIETY: Status: ACTIVE | Noted: 2021-09-30

## 2021-09-30 PROBLEM — E87.8 DISEQUILIBRIUM SYNDROME: Status: ACTIVE | Noted: 2021-09-17

## 2021-09-30 PROBLEM — M62.81 MUSCLE RIGHT ARM WEAKNESS: Status: ACTIVE | Noted: 2021-09-30

## 2021-09-30 PROBLEM — G89.29 CHRONIC MIDLINE LOW BACK PAIN WITH BILATERAL SCIATICA: Status: ACTIVE | Noted: 2021-09-30

## 2021-09-30 PROBLEM — R41.3: Status: ACTIVE | Noted: 2021-09-30

## 2021-09-30 PROBLEM — M54.42 CHRONIC MIDLINE LOW BACK PAIN WITH BILATERAL SCIATICA: Status: ACTIVE | Noted: 2021-09-30

## 2021-09-30 PROBLEM — Z23 NEED FOR IMMUNIZATION AGAINST INFLUENZA: Status: ACTIVE | Noted: 2021-09-30

## 2021-09-30 PROBLEM — M54.41 CHRONIC MIDLINE LOW BACK PAIN WITH BILATERAL SCIATICA: Status: ACTIVE | Noted: 2021-09-30

## 2021-11-03 DIAGNOSIS — E78.2 MIXED HYPERLIPIDEMIA: ICD-10-CM

## 2021-11-03 RX ORDER — ATORVASTATIN CALCIUM 80 MG/1
80 TABLET, FILM COATED ORAL DAILY
Qty: 30 TABLET | Refills: 5 | Status: ON HOLD | OUTPATIENT
Start: 2021-11-03 | End: 2022-06-28 | Stop reason: HOSPADM

## 2021-11-03 NOTE — TELEPHONE ENCOUNTER
----- Message from Amada Rodriguez sent at 11/3/2021 11:07 AM EDT -----  Subject: Refill Request    QUESTIONS  Name of Medication? atorvastatin (LIPITOR) 80 MG tablet  Patient-reported dosage and instructions? Take 1 tablet by mouth daily  How many days do you have left? 0  Preferred Pharmacy? [id number]  Pharmacy phone number (if available)? [phone]  ---------------------------------------------------------------------------  --------------  CALL BACK INFO  What is the best way for the office to contact you? OK to leave message on   voicemail  Preferred Call Back Phone Number?  [phone]

## 2021-11-04 ENCOUNTER — OFFICE VISIT (OUTPATIENT)
Dept: PHYSICAL MEDICINE AND REHAB | Age: 61
End: 2021-11-04
Payer: COMMERCIAL

## 2021-11-04 VITALS
WEIGHT: 240 LBS | HEART RATE: 68 BPM | SYSTOLIC BLOOD PRESSURE: 120 MMHG | HEIGHT: 65 IN | DIASTOLIC BLOOD PRESSURE: 52 MMHG | TEMPERATURE: 97.3 F | BODY MASS INDEX: 39.99 KG/M2

## 2021-11-04 DIAGNOSIS — R41.3 MEMORY IMPAIRMENT: ICD-10-CM

## 2021-11-04 DIAGNOSIS — E11.42 DIABETIC POLYNEUROPATHY ASSOCIATED WITH TYPE 2 DIABETES MELLITUS (HCC): ICD-10-CM

## 2021-11-04 DIAGNOSIS — G81.91 RIGHT HEMIPARESIS (HCC): ICD-10-CM

## 2021-11-04 DIAGNOSIS — I63.9 CEREBROVASCULAR ACCIDENT (CVA), UNSPECIFIED MECHANISM (HCC): Primary | ICD-10-CM

## 2021-11-04 PROCEDURE — 99213 OFFICE O/P EST LOW 20 MIN: CPT | Performed by: STUDENT IN AN ORGANIZED HEALTH CARE EDUCATION/TRAINING PROGRAM

## 2021-11-04 PROCEDURE — 3052F HG A1C>EQUAL 8.0%<EQUAL 9.0%: CPT | Performed by: STUDENT IN AN ORGANIZED HEALTH CARE EDUCATION/TRAINING PROGRAM

## 2021-11-04 NOTE — PROGRESS NOTES
[facility] PHYSICIANS  [facility] PHYSICAL MEDICINE & REHABILITATION  [address] [place] [address]  [place] [address]  Dept: [phone]  Dept Fax: [phone]    Outpatient Followup Note    Piotr Wheatley is a 59y.o.-year-old female presenting for follow up of stroke. HPI:     Initial History:  She was originally admitted to [facility]. 8/1/2021 for acute right upper limb weakness and mental status change. MRI showed acute lacunar infarct of the left frontal lobe, and she was treated medically. Nephrology followed for AURELIA on CKD, and she was initiated on hemodialysis. She was admitted to [address] [place] [address] from 8/11/21 to 8/26/21.     She re-presented to [facility] following a syncopal episode in dialysis, after which she was reportedly confused.  She reported right lower limb weakness and heaviness.  Imaging of the brain was unremarkable.  EEG showed mild encephalopathy with no epileptiform activity.  Symptoms were thought to be secondary to hypoperfusion.  Pulmonology was consulted for new oxygen requirement. She was again admitted to [address] [place] [address] from 9/17/21 to 9/26/21.     Interval History:  Since discharge from acute rehab, she reports doing pretty well. She states that she participated in home health PT, OT, and ST, which she has now completed. She is interested in outpatient therapies. She uses a rollator in the community but only uses it sometimes at home. However, she states that it is always nearby in case she needs it. She has been trying to walk outside about 2-3 days per week. She notes that she is able to completed ADLs independently, although she has not been taking a full shower at this time due to tunneled dialysis catheter. She also reports completing IADLs such as cooking and laundry. She notes continued weakness in the right upper and lower limbs. She feels like her lower limbs get fatigued quickly as well.   She states that she has been using foam for Onset    Diabetes Father     Heart Failure Father      Social History     Socioeconomic History    Marital status: Single     Spouse name: None    Number of children: None    Years of education: None    Highest education level: None   Occupational History    None   Tobacco Use    Smoking status: Never Smoker    Smokeless tobacco: Never Used   Vaping Use    Vaping Use: Never used   Substance and Sexual Activity    Alcohol use: No    Drug use: No    Sexual activity: Not Currently   Other Topics Concern    None   Social History Narrative    None     Social Determinants of Health     Financial Resource Strain: Low Risk     Difficulty of Paying Living Expenses: Not hard at all   Food Insecurity:     Worried About [employer] [employer] in the Last Year:     Ran Out of Food in the Last Year:    Transportation Needs:     Lack of Transportation (Medical):  Lack of Transportation (Non-Medical):    Physical Activity:     Days of Exercise per Week:     Minutes of Exercise per Session:    Stress:     Feeling of Stress :    Social Connections:     Frequency of Communication with Friends and Family:     Frequency of Social Gatherings with Friends and Family:     Attends Orthodoxy Services:     Active Member of Clubs or Organizations:     Attends Club or Organization Meetings:     Marital Status:    Intimate Partner Violence:     Fear of Current or Ex-Partner:     Emotionally Abused:     Physically Abused:     Sexually Abused:         Allergies   Allergen Reactions    Adhesive Tape Other (See Comments) and Rash     Other reaction(s): Unknown    Ace Inhibitors Other (See Comments)     cough    Iv Dye [Iodides]      Stage 4 kidney disease    Metformin And Related Hives, Itching and Rash       Current Outpatient Medications   Medication Sig Dispense Refill    atorvastatin (LIPITOR) 80 MG tablet Take 1 tablet by mouth daily 30 tablet 5    aspirin 81 MG chewable tablet Take 1 tablet by mouth daily 30 tablet 0    ranolazine (RANEXA) 1000 MG extended release tablet Take 1 tablet by mouth 2 times daily 60 tablet 0    busPIRone (BUSPAR) 7.5 MG tablet Take 1 tablet by mouth 3 times daily 90 tablet 0    ELIQUIS 5 MG TABS tablet Take 1 tablet by mouth 2 times daily 60 tablet 0    traZODone (DESYREL) 50 MG tablet Take 1.5 tablets by mouth nightly 45 tablet 0    insulin glargine (BASAGLAR KWIKPEN) 100 UNIT/ML injection pen Inject 53 Units into the skin 2 times daily 10 pen 0    carvedilol (COREG) 6.25 MG tablet Take 1 tablet by mouth 2 times daily 60 tablet 0    furosemide (LASIX) 80 MG tablet Take 1 tablet by mouth 2 times daily 60 tablet 0    febuxostat (ULORIC) 40 MG TABS tablet Take 1 tablet by mouth daily 30 tablet 0    Insulin Pen Needle (BD ULTRA-FINE PEN NEEDLES) 29G X 12.7MM MISC Use one needle for each insulin injection. 200 each 0    tamsulosin (FLOMAX) 0.4 MG capsule Take 1 capsule by mouth daily 30 capsule 0    pantoprazole (PROTONIX) 40 MG tablet Take 1 tablet by mouth every morning (before breakfast) 30 tablet 0    insulin lispro, 1 Unit Dial, (HUMALOG KWIKPEN) 100 UNIT/ML SOPN Per sliding scale:  If <139  No Insulin; 140-199  2 Units; 200-249  4 Units; 250-299  6 Units; 300-349  8 Units; 350-400  10 Units; Above 400  12 Units 3 pen 0    acetaminophen (TYLENOL) 325 MG tablet Take 2 tablets by mouth every 4 hours as needed for Pain      blood glucose test strips (DEN CONTOUR TEST) strip 1 each by In Vitro route 3 times daily 100 each 0    ferrous sulfate (BRIAN-ARCHIE) 325 (65 Fe) MG tablet Take 1 tablet by mouth 2 times daily (with meals)      senna (SENOKOT) 8.6 MG tablet Take 2 tablets by mouth daily as needed (Constipation)      Alcohol Swabs 70 % PADS Use an alcohol swab to cleanse the skin before checking your blood sugar and before each insulin injection.  240 each 0    sharps container 1 each by Does not apply route as needed (dirty pen needles) 1 each 11    gabapentin (NEURONTIN) 300 MG capsule Take 1 capsule by mouth 2 times daily for 30 days. 60 capsule 0     No current facility-administered medications for this visit. Subjective:      Review of Systems   Constitutional: Positive for fatigue. Musculoskeletal: Positive for myalgias. Neurological: Positive for weakness and numbness. Objective:     Physical Exam  BP (!) 120/52   Pulse 68   Temp 97.3 °F (36.3 °C)   Ht 5' 5\" (1.651 m)   Wt 240 lb (108.9 kg)   BMI 39.94 kg/m²     Constitutional: She appears well-developed and well-nourished. In no distress. HEENT: NCAT, EOMI. Hearing grossly intact. Mucous membranes pink and moist.  Pulmonary/Chest: Respirations WNL and unlabored. MSK: Functional ROM in all limbs. Strength 4/5 in the right upper and lower limbs. Strength 5/5 in the left upper and lower limbs. Neurological: She is alert. Sensation to light touch decreased in the 5th digit of the right hand compared to the left and in the distal right lower limb compared to the left. She presents to the office in a manual wheelchair (hospital-owned) but is able to ambulate without an assistive device for a short distance in the room. Gait somewhat slowed. Skin: Skin is warm dry and intact with good turgor. Psychiatric: She has a normal mood and affect. Her behavior is normal. Thought content normal.    Nursing note and vitals reviewed. Assessment:       Diagnosis Orders   1. Cerebrovascular accident (CVA), unspecified mechanism [facility])  [address]   2. Right hemiparesis ([facility]     3. Memory impairment     4. Diabetic polyneuropathy associated with type 2 diabetes mellitus ([facility]          Plan:      - Placed referrals to outpatient physical, occupational, and speech therapy  - Continue use of rollator in the community for assistance with ambulation. Continue use of rollator as needed at home.   - Could consider capsaicin cream for bilateral lower limb pain related to neuropathy and dialysis treatments. She would like to try the lidocaine patches that she has at home first.  - Will consider referral to driving rehab in the future  - Follow up with PCP, neurology, and nephrology as directed (provided patient with information regarding the existing referral to neurology so that she can make an appointment)    Orders Placed This Encounter   Procedures   [address].     Referral Priority:   Routine     Referral Type:   Eval and Treat     Referral Reason:   Specialty Services Required     Requested Specialty:   Physical Therapy     Number of Visits Requested:   [place] 32     Referral Priority:   Routine     Referral Type:   Eval and Treat     Referral Reason:   Specialty Services Required     Requested Specialty:   Occupational Therapy     Number of Visits Requested:   Emily Bowman 57     Referral Priority:   Routine     Referral Type:   Eval and Treat     Referral Reason:   Specialty Services Required     Requested Specialty:   Speech Pathology     Number of Visits Requested:   1       Return in about 2 months (around 1/4/2022).        Electronically signed by Mikala Rust MD on 11/4/2021 at 5:17 PM.

## 2021-11-10 ENCOUNTER — APPOINTMENT (OUTPATIENT)
Dept: CT IMAGING | Age: 61
DRG: 882 | End: 2021-11-10
Attending: STUDENT IN AN ORGANIZED HEALTH CARE EDUCATION/TRAINING PROGRAM
Payer: COMMERCIAL

## 2021-11-10 ENCOUNTER — HOSPITAL ENCOUNTER (INPATIENT)
Age: 61
LOS: 5 days | Discharge: NURSING, REHAB OR LONG-TERM CARE | DRG: 882 | End: 2021-11-15
Attending: STUDENT IN AN ORGANIZED HEALTH CARE EDUCATION/TRAINING PROGRAM | Admitting: FAMILY MEDICINE
Payer: COMMERCIAL

## 2021-11-10 ENCOUNTER — APPOINTMENT (OUTPATIENT)
Dept: GENERAL RADIOLOGY | Age: 61
DRG: 882 | End: 2021-11-10
Attending: STUDENT IN AN ORGANIZED HEALTH CARE EDUCATION/TRAINING PROGRAM
Payer: COMMERCIAL

## 2021-11-10 DIAGNOSIS — R41.82 ALTERED MENTAL STATUS, UNSPECIFIED ALTERED MENTAL STATUS TYPE: Primary | ICD-10-CM

## 2021-11-10 LAB
ABSOLUTE EOS #: 0.1 K/UL (ref 0–0.4)
ABSOLUTE IMMATURE GRANULOCYTE: ABNORMAL K/UL (ref 0–0.3)
ABSOLUTE LYMPH #: 1.3 K/UL (ref 1–4.8)
ABSOLUTE MONO #: 0.3 K/UL (ref 0.1–1.3)
ANION GAP SERPL CALCULATED.3IONS-SCNC: 14 MMOL/L (ref 9–17)
BASOPHILS # BLD: 1 % (ref 0–2)
BASOPHILS ABSOLUTE: 0 K/UL (ref 0–0.2)
BNP INTERPRETATION: ABNORMAL
BUN BLDV-MCNC: 16 MG/DL (ref 8–23)
BUN/CREAT BLD: ABNORMAL (ref 9–20)
CALCIUM SERPL-MCNC: 9.2 MG/DL (ref 8.6–10.4)
CHLORIDE BLD-SCNC: 102 MMOL/L (ref 98–107)
CO2: 22 MMOL/L (ref 20–31)
CREAT SERPL-MCNC: 1.42 MG/DL (ref 0.5–0.9)
DIFFERENTIAL TYPE: ABNORMAL
EOSINOPHILS RELATIVE PERCENT: 3 % (ref 0–4)
GFR AFRICAN AMERICAN: 45 ML/MIN
GFR NON-AFRICAN AMERICAN: 37 ML/MIN
GFR NON-AFRICAN AMERICAN: 46 ML/MIN
GFR SERPL CREATININE-BSD FRML MDRD: 56 ML/MIN
GFR SERPL CREATININE-BSD FRML MDRD: ABNORMAL ML/MIN/{1.73_M2}
GLUCOSE BLD-MCNC: 347 MG/DL (ref 70–99)
GLUCOSE BLD-MCNC: 379 MG/DL (ref 65–105)
HCT VFR BLD CALC: 37 % (ref 36–46)
HEMOGLOBIN: 13 G/DL (ref 12–16)
IMMATURE GRANULOCYTES: ABNORMAL %
INR BLD: 0.9
LYMPHOCYTES # BLD: 25 % (ref 24–44)
MAGNESIUM: 1.9 MG/DL (ref 1.6–2.6)
MCH RBC QN AUTO: 33.3 PG (ref 26–34)
MCHC RBC AUTO-ENTMCNC: 35.1 G/DL (ref 31–37)
MCV RBC AUTO: 94.8 FL (ref 80–100)
MONOCYTES # BLD: 7 % (ref 1–7)
NRBC AUTOMATED: ABNORMAL PER 100 WBC
PARTIAL THROMBOPLASTIN TIME: 27.5 SEC (ref 24–36)
PDW BLD-RTO: 15.3 % (ref 11.5–14.9)
PLATELET # BLD: 171 K/UL (ref 150–450)
PLATELET ESTIMATE: ABNORMAL
PMV BLD AUTO: 8.4 FL (ref 6–12)
POC CREATININE: 1.18 MG/DL (ref 0.51–1.19)
POTASSIUM SERPL-SCNC: 4 MMOL/L (ref 3.7–5.3)
PRO-BNP: 964 PG/ML
PROTHROMBIN TIME: 12.7 SEC (ref 11.8–14.6)
RBC # BLD: 3.9 M/UL (ref 4–5.2)
RBC # BLD: ABNORMAL 10*6/UL
SEG NEUTROPHILS: 64 % (ref 36–66)
SEGMENTED NEUTROPHILS ABSOLUTE COUNT: 3.3 K/UL (ref 1.3–9.1)
SODIUM BLD-SCNC: 138 MMOL/L (ref 135–144)
TROPONIN INTERP: ABNORMAL
TROPONIN INTERP: ABNORMAL
TROPONIN T: ABNORMAL NG/ML
TROPONIN T: ABNORMAL NG/ML
TROPONIN, HIGH SENSITIVITY: 52 NG/L (ref 0–14)
TROPONIN, HIGH SENSITIVITY: 57 NG/L (ref 0–14)
WBC # BLD: 5.1 K/UL (ref 3.5–11)
WBC # BLD: ABNORMAL 10*3/UL

## 2021-11-10 PROCEDURE — 71045 X-RAY EXAM CHEST 1 VIEW: CPT

## 2021-11-10 PROCEDURE — 83735 ASSAY OF MAGNESIUM: CPT

## 2021-11-10 PROCEDURE — 99449 NTRPROF PH1/NTRNET/EHR 31/>: CPT | Performed by: PSYCHIATRY & NEUROLOGY

## 2021-11-10 PROCEDURE — 85025 COMPLETE CBC W/AUTO DIFF WBC: CPT

## 2021-11-10 PROCEDURE — 2060000000 HC ICU INTERMEDIATE R&B

## 2021-11-10 PROCEDURE — 82947 ASSAY GLUCOSE BLOOD QUANT: CPT

## 2021-11-10 PROCEDURE — 93005 ELECTROCARDIOGRAM TRACING: CPT | Performed by: STUDENT IN AN ORGANIZED HEALTH CARE EDUCATION/TRAINING PROGRAM

## 2021-11-10 PROCEDURE — 2580000003 HC RX 258: Performed by: FAMILY MEDICINE

## 2021-11-10 PROCEDURE — 85730 THROMBOPLASTIN TIME PARTIAL: CPT

## 2021-11-10 PROCEDURE — 6360000004 HC RX CONTRAST MEDICATION: Performed by: STUDENT IN AN ORGANIZED HEALTH CARE EDUCATION/TRAINING PROGRAM

## 2021-11-10 PROCEDURE — 99285 EMERGENCY DEPT VISIT HI MDM: CPT

## 2021-11-10 PROCEDURE — 6370000000 HC RX 637 (ALT 250 FOR IP): Performed by: FAMILY MEDICINE

## 2021-11-10 PROCEDURE — 84484 ASSAY OF TROPONIN QUANT: CPT

## 2021-11-10 PROCEDURE — 2580000003 HC RX 258: Performed by: STUDENT IN AN ORGANIZED HEALTH CARE EDUCATION/TRAINING PROGRAM

## 2021-11-10 PROCEDURE — 70450 CT HEAD/BRAIN W/O DYE: CPT

## 2021-11-10 PROCEDURE — 80048 BASIC METABOLIC PNL TOTAL CA: CPT

## 2021-11-10 PROCEDURE — 70496 CT ANGIOGRAPHY HEAD: CPT

## 2021-11-10 PROCEDURE — 85610 PROTHROMBIN TIME: CPT

## 2021-11-10 PROCEDURE — 83880 ASSAY OF NATRIURETIC PEPTIDE: CPT

## 2021-11-10 PROCEDURE — 36415 COLL VENOUS BLD VENIPUNCTURE: CPT

## 2021-11-10 PROCEDURE — 82565 ASSAY OF CREATININE: CPT

## 2021-11-10 RX ORDER — DEXTROSE MONOHYDRATE 25 G/50ML
12.5 INJECTION, SOLUTION INTRAVENOUS PRN
Status: DISCONTINUED | OUTPATIENT
Start: 2021-11-10 | End: 2021-11-15 | Stop reason: HOSPADM

## 2021-11-10 RX ORDER — NICOTINE POLACRILEX 4 MG
15 LOZENGE BUCCAL PRN
Status: DISCONTINUED | OUTPATIENT
Start: 2021-11-10 | End: 2021-11-15 | Stop reason: HOSPADM

## 2021-11-10 RX ORDER — RANOLAZINE 500 MG/1
1000 TABLET, EXTENDED RELEASE ORAL 2 TIMES DAILY
Status: DISCONTINUED | OUTPATIENT
Start: 2021-11-10 | End: 2021-11-15 | Stop reason: HOSPADM

## 2021-11-10 RX ORDER — ATORVASTATIN CALCIUM 80 MG/1
80 TABLET, FILM COATED ORAL DAILY
Status: DISCONTINUED | OUTPATIENT
Start: 2021-11-10 | End: 2021-11-15 | Stop reason: HOSPADM

## 2021-11-10 RX ORDER — FERROUS SULFATE 325(65) MG
325 TABLET ORAL 2 TIMES DAILY WITH MEALS
Status: DISCONTINUED | OUTPATIENT
Start: 2021-11-10 | End: 2021-11-15 | Stop reason: HOSPADM

## 2021-11-10 RX ORDER — BUSPIRONE HYDROCHLORIDE 5 MG/1
7.5 TABLET ORAL 3 TIMES DAILY
Status: DISCONTINUED | OUTPATIENT
Start: 2021-11-10 | End: 2021-11-15 | Stop reason: HOSPADM

## 2021-11-10 RX ORDER — ONDANSETRON 2 MG/ML
4 INJECTION INTRAMUSCULAR; INTRAVENOUS EVERY 6 HOURS PRN
Status: DISCONTINUED | OUTPATIENT
Start: 2021-11-10 | End: 2021-11-15 | Stop reason: HOSPADM

## 2021-11-10 RX ORDER — DEXTROSE MONOHYDRATE 50 MG/ML
100 INJECTION, SOLUTION INTRAVENOUS PRN
Status: DISCONTINUED | OUTPATIENT
Start: 2021-11-10 | End: 2021-11-15 | Stop reason: HOSPADM

## 2021-11-10 RX ORDER — SODIUM CHLORIDE 0.9 % (FLUSH) 0.9 %
5-40 SYRINGE (ML) INJECTION EVERY 12 HOURS SCHEDULED
Status: DISCONTINUED | OUTPATIENT
Start: 2021-11-10 | End: 2021-11-15 | Stop reason: HOSPADM

## 2021-11-10 RX ORDER — TRAZODONE HYDROCHLORIDE 50 MG/1
75 TABLET ORAL NIGHTLY
Status: DISCONTINUED | OUTPATIENT
Start: 2021-11-10 | End: 2021-11-15 | Stop reason: HOSPADM

## 2021-11-10 RX ORDER — INSULIN GLARGINE 100 [IU]/ML
53 INJECTION, SOLUTION SUBCUTANEOUS 2 TIMES DAILY
Status: DISCONTINUED | OUTPATIENT
Start: 2021-11-10 | End: 2021-11-13

## 2021-11-10 RX ORDER — FEBUXOSTAT 40 MG/1
40 TABLET, FILM COATED ORAL DAILY
Status: DISCONTINUED | OUTPATIENT
Start: 2021-11-10 | End: 2021-11-15 | Stop reason: HOSPADM

## 2021-11-10 RX ORDER — SODIUM CHLORIDE 9 MG/ML
25 INJECTION, SOLUTION INTRAVENOUS PRN
Status: DISCONTINUED | OUTPATIENT
Start: 2021-11-10 | End: 2021-11-15 | Stop reason: HOSPADM

## 2021-11-10 RX ORDER — ASPIRIN 81 MG/1
81 TABLET, CHEWABLE ORAL DAILY
Status: DISCONTINUED | OUTPATIENT
Start: 2021-11-10 | End: 2021-11-15 | Stop reason: HOSPADM

## 2021-11-10 RX ORDER — 0.9 % SODIUM CHLORIDE 0.9 %
80 INTRAVENOUS SOLUTION INTRAVENOUS ONCE
Status: COMPLETED | OUTPATIENT
Start: 2021-11-10 | End: 2021-11-10

## 2021-11-10 RX ORDER — ONDANSETRON 4 MG/1
4 TABLET, ORALLY DISINTEGRATING ORAL EVERY 8 HOURS PRN
Status: DISCONTINUED | OUTPATIENT
Start: 2021-11-10 | End: 2021-11-15 | Stop reason: HOSPADM

## 2021-11-10 RX ORDER — SODIUM CHLORIDE 0.9 % (FLUSH) 0.9 %
5-40 SYRINGE (ML) INJECTION PRN
Status: DISCONTINUED | OUTPATIENT
Start: 2021-11-10 | End: 2021-11-15 | Stop reason: HOSPADM

## 2021-11-10 RX ORDER — SODIUM CHLORIDE 0.9 % (FLUSH) 0.9 %
10 SYRINGE (ML) INJECTION PRN
Status: DISCONTINUED | OUTPATIENT
Start: 2021-11-10 | End: 2021-11-15 | Stop reason: HOSPADM

## 2021-11-10 RX ORDER — CARVEDILOL 6.25 MG/1
6.25 TABLET ORAL 2 TIMES DAILY
Status: DISCONTINUED | OUTPATIENT
Start: 2021-11-10 | End: 2021-11-15 | Stop reason: HOSPADM

## 2021-11-10 RX ORDER — ACETAMINOPHEN 325 MG/1
650 TABLET ORAL EVERY 4 HOURS PRN
Status: DISCONTINUED | OUTPATIENT
Start: 2021-11-10 | End: 2021-11-15 | Stop reason: HOSPADM

## 2021-11-10 RX ORDER — FUROSEMIDE 40 MG/1
80 TABLET ORAL 2 TIMES DAILY
Status: DISCONTINUED | OUTPATIENT
Start: 2021-11-10 | End: 2021-11-15 | Stop reason: HOSPADM

## 2021-11-10 RX ORDER — PANTOPRAZOLE SODIUM 40 MG/1
40 TABLET, DELAYED RELEASE ORAL
Status: DISCONTINUED | OUTPATIENT
Start: 2021-11-11 | End: 2021-11-15 | Stop reason: HOSPADM

## 2021-11-10 RX ORDER — TAMSULOSIN HYDROCHLORIDE 0.4 MG/1
0.4 CAPSULE ORAL DAILY
Status: DISCONTINUED | OUTPATIENT
Start: 2021-11-10 | End: 2021-11-15 | Stop reason: HOSPADM

## 2021-11-10 RX ORDER — GABAPENTIN 300 MG/1
300 CAPSULE ORAL 2 TIMES DAILY
Status: DISCONTINUED | OUTPATIENT
Start: 2021-11-10 | End: 2021-11-15 | Stop reason: HOSPADM

## 2021-11-10 RX ADMIN — SODIUM CHLORIDE, PRESERVATIVE FREE 10 ML: 5 INJECTION INTRAVENOUS at 16:22

## 2021-11-10 RX ADMIN — GABAPENTIN 300 MG: 300 CAPSULE ORAL at 22:24

## 2021-11-10 RX ADMIN — INSULIN LISPRO 7 UNITS: 100 INJECTION, SOLUTION INTRAVENOUS; SUBCUTANEOUS at 22:41

## 2021-11-10 RX ADMIN — SODIUM CHLORIDE, PRESERVATIVE FREE 10 ML: 5 INJECTION INTRAVENOUS at 22:33

## 2021-11-10 RX ADMIN — IOPAMIDOL 75 ML: 755 INJECTION, SOLUTION INTRAVENOUS at 16:22

## 2021-11-10 RX ADMIN — TRAZODONE HYDROCHLORIDE 75 MG: 50 TABLET ORAL at 22:23

## 2021-11-10 RX ADMIN — RANOLAZINE 1000 MG: 500 TABLET, FILM COATED, EXTENDED RELEASE ORAL at 22:23

## 2021-11-10 RX ADMIN — APIXABAN 5 MG: 5 TABLET, FILM COATED ORAL at 22:23

## 2021-11-10 RX ADMIN — CARVEDILOL 6.25 MG: 6.25 TABLET, FILM COATED ORAL at 22:22

## 2021-11-10 RX ADMIN — BUSPIRONE HYDROCHLORIDE 7.5 MG: 5 TABLET ORAL at 22:23

## 2021-11-10 RX ADMIN — SODIUM CHLORIDE 80 ML: 9 INJECTION, SOLUTION INTRAVENOUS at 16:22

## 2021-11-10 RX ADMIN — FUROSEMIDE 80 MG: 40 TABLET ORAL at 22:22

## 2021-11-10 RX ADMIN — INSULIN GLARGINE 53 UNITS: 100 INJECTION, SOLUTION SUBCUTANEOUS at 22:40

## 2021-11-10 ASSESSMENT — ENCOUNTER SYMPTOMS
SORE THROAT: 0
COUGH: 0
ABDOMINAL PAIN: 0
VOMITING: 0
DIARRHEA: 0
RHINORRHEA: 0
NAUSEA: 0
SHORTNESS OF BREATH: 0

## 2021-11-10 NOTE — ED NOTES
Bed: 02  Expected date:   Expected time:   Means of arrival:   Comments:  CHINYERE [address] Lino Her RN  11/10/21 3685

## 2021-11-10 NOTE — PROGRESS NOTES
Medication History completed:    New medications: None    Medications discontinued: None    Changes to dosing: None    Stated allergies: As listed    Other pertinent information:  Confirmed medication list with [facility].      Peri Covington, PharmD Candidate [id number]

## 2021-11-10 NOTE — ED PROVIDER NOTES
[facility] ED  Emergency Department Encounter  Emergency Medicine Resident     Pt Name: Raffi Pfeiffer  MRN: [id number]  [place] 1960  Date of evaluation: 11/10/21  PCP:  AFSANEH Mayberry CNP    CHIEF COMPLAINT       Chief Complaint   Patient presents with    Altered Mental Status     Symptom onset while at dialysis. Right arm weakness, confusion, headache which became steadily worse. Was awake and alert at dialysis, LOC decreased while enroute to the ER.  Fatigue    Headache       HISTORY OFPRESENT ILLNESS  (Location/Symptom, Timing/Onset, Context/Setting, Quality, Duration, Modifying Factors,Severity.)      Raffi Pfeiffer is a 59 y.o. female who presents with episode of altered mental status/loss of consciousness while at dialysis today. Patient has CKD and began receiving dialysis in August 2021. Patient complaining of right upper extremity, right lower extremity weakness, heaviness. On arrival, patient is unable to confirm her last name or date of birth. According to EMS, the patient was complaining of chest pain prior to this episode. On chart review, the patient was admitted to [facility] on 8/1 for right arm weakness and mental status change. Subsequent MRI showed acute lacunar infarct of the left frontal lobe. She was treated medically and admitted to [facility] rehabilitation from 8/11-8/26 due to requiring assistance for self-care activities and mobility. On 9/13, the patient had a similar episode of altered mental status/loss of consciousness while at dialysis with residual confusion, numbness and weakness of the right side of her body. This episode was thought to be due to hypoperfusion, she was admitted to the rehab center on 9/17 and discharged 9/26.     PAST MEDICAL / SURGICAL / SOCIAL / FAMILY HISTORY      has a past medical history of Backache, unspecified, CHF (congestive heart failure) ([address] Chronic kidney disease, Coronary Units into the skin 2 times daily 9/25/21  Yes Josh Jean MD   carvedilol (COREG) 6.25 MG tablet Take 1 tablet by mouth 2 times daily 9/25/21  Yes Josh Jean MD   furosemide (LASIX) 80 MG tablet Take 1 tablet by mouth 2 times daily 9/25/21  Yes Josh Jean MD   febuxostat (ULORIC) 40 MG TABS tablet Take 1 tablet by mouth daily 9/25/21  Yes Josh Jean MD   tamsulosin [facility]) 0.4 MG capsule Take 1 capsule by mouth daily 9/25/21  Yes Josh Jean MD   pantoprazole (PROTONIX) 40 MG tablet Take 1 tablet by mouth every morning (before breakfast) 9/25/21  Yes Johs Jean MD   insulin lispro, 1 Unit Dial, (HUMALOG KWIKPEN) 100 UNIT/ML SOPN Per sliding scale:  If <139  No Insulin; 140-199  2 Units; 200-249  4 Units; 250-299  6 Units; 300-349  8 Units; 350-400  10 Units; Above 400  12 Units 9/25/21  Yes Josh Jean MD   blood glucose test strips (DEN CONTOUR TEST) strip 1 each by In Vitro route 3 times daily 8/25/21  Yes Josh Jean MD   ferrous sulfate (BRIAN-ARCHIE) 325 (65 Fe) MG tablet Take 1 tablet by mouth 2 times daily (with meals) 8/25/21  Yes Josh Jean MD   senna (SENOKOT) 8.6 MG tablet Take 2 tablets by mouth daily as needed (Constipation) 8/25/21  Yes Josh Jean MD   Insulin Pen Needle (BD ULTRA-FINE PEN NEEDLES) 29G X 12.7MM MISC Use one needle for each insulin injection. 9/25/21   Josh Jean MD   acetaminophen (TYLENOL) 325 MG tablet Take 2 tablets by mouth every 4 hours as needed for Pain 8/25/21   Josh Jean MD   Alcohol Swabs 70 % PADS Use an alcohol swab to cleanse the skin before checking your blood sugar and before each insulin injection.  8/25/21   Josh Jean MD   sharps container 1 each by Does not apply route as needed (dirty pen needles) 4/19/21   Janeal Canny, APRN - CNP   allopurinol (ZYLOPRIM) 100 MG tablet Take 1 tablet by mouth daily 4/14/21   AFSANEH Aguilar CNP       REVIEW OFSYSTEMS    (2-9 systems for level 4, 10 or more for level 5)      Review of Systems   Constitutional: Negative for appetite change, chills, fatigue and fever. HENT: Negative for congestion, rhinorrhea, sneezing and sore throat. Eyes: Negative for visual disturbance. Respiratory: Negative for cough and shortness of breath. Cardiovascular: Positive for chest pain. Negative for leg swelling. Gastrointestinal: Negative for abdominal pain, diarrhea, nausea and vomiting. Genitourinary: Negative for dysuria. Musculoskeletal: Negative for myalgias, neck pain and neck stiffness. Skin: Negative for rash and wound. Neurological: Positive for speech difficulty, weakness and headaches. Negative for dizziness, syncope and light-headedness. Psychiatric/Behavioral: Negative for dysphoric mood and suicidal ideas. PHYSICAL EXAM   (up to 7 for level 4, 8 or more forlevel 5)      INITIAL VITALS:   Vitals:    11/10/21 1845   BP: (!) 140/83   Pulse: 64   Resp: 16   Temp: 98.7 °F (37.1 °C)   SpO2: 94%   BP (!) 140/83   Pulse 64   Temp 98.7 °F (37.1 °C) (Oral)   Resp 16   Ht 5' 4\" (1.626 m)   Wt 240 lb (108.9 kg)   SpO2 94%   BMI 41.20 kg/m²        Physical Exam  Vitals and nursing note reviewed. Constitutional:       General: She is not in acute distress. Appearance: Normal appearance. She is not ill-appearing or diaphoretic. HENT:      Head: Normocephalic. Nose: Nose normal.      Mouth/Throat:      Mouth: Mucous membranes are moist.      Pharynx: Oropharynx is clear. Eyes:      Extraocular Movements: Extraocular movements intact. Conjunctiva/sclera: Conjunctivae normal.      Pupils: Pupils are equal, round, and reactive to light. Cardiovascular:      Rate and Rhythm: Normal rate and regular rhythm. Pulses: Normal pulses. Heart sounds: Normal heart sounds. Pulmonary:      Effort: Pulmonary effort is normal. No respiratory distress. Breath sounds: Normal breath sounds. No wheezing or rales.    Chest: Chest wall: No tenderness. Abdominal:      General: There is no distension. Palpations: Abdomen is soft. Tenderness: There is no abdominal tenderness. There is no guarding or rebound. Musculoskeletal:         General: Normal range of motion. Cervical back: Normal range of motion and neck supple. Skin:     General: Skin is warm. Capillary Refill: Capillary refill takes less than 2 seconds. Neurological:      General: No focal deficit present. Mental Status: She is alert. She is disoriented. Cranial Nerves: Dysarthria present. Sensory: Sensation is intact. Motor: Weakness present. Coordination: Finger-Nose-Finger Test abnormal and Heel to [employer] abnormal.      Comments: Patient is able to tell me her first name but does not member her last name or date of birth. Does not know month, year or location. Psychiatric:         Mood and Affect: Mood normal.         Behavior: Behavior normal.         DIFFERENTIAL  DIAGNOSIS     Initial MDM/Plan: 59 y.o. female who presents with episode of confusion, loss of consciousness, right upper extremity and right lower extremity weakness during dialysis treatment today. Patient had an acute lacunar infarct 8/1 with right upper extremity right lower extremity weakness. She had a similar episode of symptoms such as today's in September while getting dialysis and required prolonged inpatient rehab stay due to prolonged recovery. On arrival in the emergency department, blood pressure 166/66, heart rate within normal limits, afebrile, saturating 98% on room air. She is initially sleeping but easy to arouse, dysarthric, unable to follow commands with right upper extremity right lower extremity weakness. NIH calculated to be 9 at that point. Stroke alert called. Stroke alert work-up including CT head, CTA head and neck, CBC, CMP, troponin, BMP, magnesium, EKG and chest x-ray.   Will discuss with neuro endovascular, however patient is on Eliquis so I do not think that she will be a TPA candidate. DIAGNOSTIC RESULTS / EMERGENCYDEPARTMENT COURSE / MDM     LABS:  Labs Reviewed   CBC WITH AUTO DIFFERENTIAL - Abnormal; Notable for the following components:       Result Value    RBC 3.90 (*)     RDW 15.3 (*)     All other components within normal limits   BASIC METABOLIC PANEL - Abnormal; Notable for the following components:    Glucose 347 (*)     CREATININE 1.42 (*)     GFR Non- 37 (*)     GFR  45 (*)     All other components within normal limits   TROPONIN - Abnormal; Notable for the following components:    Troponin, High Sensitivity 57 (*)     All other components within normal limits   BRAIN NATRIURETIC PEPTIDE - Abnormal; Notable for the following components:    Pro- (*)     All other components within normal limits   TROPONIN - Abnormal; Notable for the following components:    Troponin, High Sensitivity 52 (*)     All other components within normal limits   CREATININE W/GFR POINT OF CARE - Abnormal; Notable for the following components:    GFR Comment 56 (*)     GFR Non- 46 (*)     All other components within normal limits   POC GLUCOSE FINGERSTICK - Abnormal; Notable for the following components:    POC Glucose 379 (*)     All other components within normal limits   MAGNESIUM   PROTIME-INR   APTT   POCT GLUCOSE   POCT GLUCOSE         RADIOLOGY:  CT Head WO Contrast    Result Date: 11/10/2021  EXAMINATION: CT OF THE HEAD WITHOUT CONTRAST  11/10/2021 4:19 pm TECHNIQUE: CT of the head was performed without the administration of intravenous contrast. Dose modulation, iterative reconstruction, and/or weight based adjustment of the mA/kV was utilized to reduce the radiation dose to as low as reasonably achievable. COMPARISON: MRI brain performed 09/13/2021.  HISTORY: ORDERING SYSTEM PROVIDED HISTORY: severe HA stroke alert TECHNOLOGIST PROVIDED HISTORY: severe HA stroke alert Decision Support Exception - unselect if not a suspected or confirmed emergency medical condition->Emergency Medical Condition (MA) Reason for Exam: severe HA stroke alert Acuity: Unknown Type of Exam: Unknown FINDINGS: BRAIN/VENTRICLES: There is no acute intracranial hemorrhage, mass effect, or midline shift. There is satisfactory overall gray-white matter differentiation. The ventricular structures are symmetric and unremarkable. The infratentorial structures are unremarkable. ORBITS: The visualized portion of the orbits demonstrate no acute abnormality. SINUSES: The visualized paranasal sinuses and mastoid air cells demonstrate no acute abnormality. SOFT TISSUES/SKULL:  No acute abnormality of the visualized skull or soft tissues. No acute intracranial abnormality. Findings were discussed with Romie Boo at 4:25 pm on 11/10/2021. XR CHEST PORTABLE    Result Date: 11/10/2021  EXAMINATION: ONE XRAY VIEW OF THE CHEST 11/10/2021 4:27 pm COMPARISON: 09/16/2021 HISTORY: ORDERING SYSTEM PROVIDED HISTORY: YOLIE, DIVINA TECHNOLOGIST PROVIDED HISTORY: DIVINA URBANO Reason for Exam: Pt unable to give hx at the time of xray. Best image per pt condition Acuity: Unknown Type of Exam: Unknown Additional signs and symptoms: Pt unable to give hx at the time of xray. Best image per pt condition FINDINGS: Limited exam secondary to underpenetration and positioning. Central catheter is similar position. The heart is enlarged, unchanged. The mediastinal and cardiac silhouette is mildly obscured, which is similar to prior. There are low lung volumes with bibasilar consolidations. Diffuse bronchial thickening. No large pneumothorax is grossly identified. The osseous structures are grossly unchanged. Low lung volumes with likely bibasilar atelectasis versus mild edema. A superimposed infection is difficult to exclude.      CTA HEAD NECK W CONTRAST    Result Date: 11/10/2021  EXAMINATION: CTA OF THE HEAD AND NECK WITH CONTRAST 11/10/2021 4:20 pm: TECHNIQUE: CTA of the head and neck was performed with the administration of intravenous contrast. Multiplanar reformatted images are provided for review. MIP images are provided for review. Stenosis of the internal carotid arteries measured using NASCET criteria. Dose modulation, iterative reconstruction, and/or weight based adjustment of the mA/kV was utilized to reduce the radiation dose to as low as reasonably achievable. COMPARISON: Noncontrast CT head from earlier today, MRA neck September 13, 2021 HISTORY: ORDERING SYSTEM PROVIDED HISTORY: stroke alert TECHNOLOGIST PROVIDED HISTORY: stroke alert Decision Support Exception - unselect if not a suspected or confirmed emergency medical condition->Emergency Medical Condition (MA) Reason for Exam: ha, stroke alert Acuity: Unknown Type of Exam: Unknown FINDINGS: CTA NECK: AORTIC ARCH/ARCH VESSELS: No dissection or arterial injury. No significant stenosis of the brachiocephalic or subclavian arteries. CAROTID ARTERIES: No dissection, arterial injury, or hemodynamically significant stenosis by NASCET criteria. VERTEBRAL ARTERIES: No dissection, arterial injury, or significant stenosis. SOFT TISSUES: There is ground-glass attenuation in the bilateral lung apices, likely related to underdistention versus mild pulmonary vascular congestion. No cervical or superior mediastinal lymphadenopathy. The larynx and pharynx are unremarkable. No acute abnormality of the salivary and thyroid glands. BONES: No acute osseous abnormality. There are postoperative changes in the cervical spine. CTA HEAD: ANTERIOR CIRCULATION: There is 30% stenosis in the intracranial left internal carotid artery. There is up to 70% stenosis in the cavernous/supraclinoid segment of the right internal carotid artery. No significant stenosis of the anterior cerebral, or middle cerebral arteries. No aneurysm.  POSTERIOR CIRCULATION: No significant stenosis of the vertebral, basilar, or posterior cerebral arteries. No aneurysm. OTHER: No dural venous sinus thrombosis on this non-dedicated study. BRAIN: No mass effect or midline shift. No extra-axial fluid collection. The gray-white differentiation is maintained. There is minimal punctate calcification along the posterior aspect of the right globe. 70% stenosis in the cavernous/supraclinoid segment of the right internal carotid artery. Otherwise, no acute abnormality or flow-limiting stenosis in the remainder of the major arteries of the head and neck. Minimal punctate calcification along the posterior aspect of the right globe. Follow-up with ophthalmology exam is recommended. EKG  EKG Interpretation    Interpreted by emergency department physician    Rhythm: normal sinus   Rate: normal  Axis: normal  Ectopy: none  Conduction: normal  ST Segments: no acute change  T Waves: no acute change  Q Waves: none    Clinical Impression: no acute changes    Velvet Crane MD      All EKG's are interpreted by the Emergency Department Physicianwho either signs or Co-signs this chart in the absence of a cardiologist.    EMERGENCY DEPARTMENT COURSE:  ED Course as of 11/10/21 2005   Wed Nov 10, 2021   1628 Per [facility]- no acute findings on CT head [JT]   1713 Troponin, High Sensitivity(!!): 57  At baseline will repeat  [JT]      ED Course User Index  [JT] Velvet Crane MD   Repeat troponin stable. On reevaluation, patient is more alert but is still confused, does not member her last name, date of birth or location. Her weakness has mostly resolved but confusion remains. I discussed with on-call endovascular neurosurgery, patient's primary care physician, and neurology. Plan is to admit patient to family medicine for further evaluation by neurology. Patient may require inpatient rehab admission given previous episode of confusion/weakness with prolonged recovery time.       PROCEDURES:  None    CONSULTS:  IP CONSULT TO PRIMARY CARE PROVIDER  IP CONSULT TO NEUROLOGY      FINAL IMPRESSION      1. Altered mental status, unspecified altered mental status type          DISPOSITION / [address] Admitted 11/10/2021 05:37:23 PM        PATIENT REFERRED TO:  No follow-up provider specified.     DISCHARGE MEDICATIONS:  Current Discharge Medication List          Derek Spence MD  Emergency Medicine Resident    (Please note that portions of this note were completed with a voice recognition program.Efforts were made to edit the dictations but occasionally words are mis-transcribed.)      Derek Spence MD  Resident  11/11/21 1111

## 2021-11-10 NOTE — ED PROVIDER NOTES
EMERGENCY DEPARTMENT ENCOUNTER   ATTENDING ATTESTATION     Pt Name: Vanessa Huang  MRN: [id number]  [place] 1960  Date of evaluation: 11/10/21       Vanessa Huang is a 59 y.o. female who presents with No chief complaint on file. MDM:   64-year-old female presented for evaluation from dialysis with right-sided weakness, slurred speech, altered mental status. History of similar symptoms with strokes in the past.  Stroke alert activated will check electrolytes will evaluate for infection causing stroke recrudescence. History of similar presentation in September of this year with negative MRI. Last known well was about 1 hour prior to arrival.  Patient takes Eliquis for A. fib and is not a TPA candidate. Discussed with neurology will admit for MRI and observation    EKG  Sinus rhythm rate of 70 normal axis normal intervals no concerning ST or T wave changes low voltage throughout unchanged from prior    Vitals:   Vitals:    11/10/21 1557   BP: (!) 166/66   Pulse: 74   Resp: 14   SpO2: 99%   Weight: 240 lb (108.9 kg)   Height: 5' 4\" (1.626 m)         I personally evaluated and examined the patient in conjunction with the resident and agree with the assessment, treatment plan, and disposition of the patient as recorded by the resident. I performed a history and physical examination of the patient and discussed management with the resident. I reviewed the residents note and agree with the documented findings and plan of care. Any areas of disagreement are noted on the chart. I was personally present for the key portions of any procedures. I have documented in the chart those procedures where I was not present during the key portions. I have personally reviewed all images and agree with the resident's interpretation. I have reviewed the emergency nurses triage note.  I agree with the chief complaint, past medical history, past surgical history, allergies, medications, social and family history as documented unless otherwise noted. The care is provided during an unprecedented national emergency due to the novel coronavirus, COVID 19.   Ava Easley MD  Attending Emergency Physician            Ava Easley MD  11/10/21 7314

## 2021-11-10 NOTE — ED TRIAGE NOTES
According to sister, Hillary Moreno, patient was not feeling well before dialysis, complained of weakness before the initial decline while in dialysis. Patient was transported to the ER after the patient complained of right arm weakness, change of mental status and a headache. Confused on arrival, does not know where she is or who she is.

## 2021-11-10 NOTE — FLOWSHEET NOTE
Upon writer's arrival, PT was taking out for CT test and there were no family members in the waiting room.      11/10/21 2129   Encounter Summary   Services provided to: Patient not available  (PT was taken out for CT)   Referral/Consult From: Multi-disciplinary team   Complexity of Encounter Low   Length of Encounter 15 minutes   Crisis   Type Stroke Alert   Assessment Unable to respond   Intervention Prayer

## 2021-11-10 NOTE — ED NOTES
Report called to the Progressive Unit. Spoke with [facility]. Patient is ready for transport to the floor.      Izabella Erwin RN  11/10/21 0146

## 2021-11-10 NOTE — ED NOTES
Dr. Nivia Sánchez back on video. Sister, Wendy Hinton in room, with Dr. Amadeo Meeks. Vital signs updated.      Lisandra Simeon RN  11/10/21 9641

## 2021-11-11 ENCOUNTER — APPOINTMENT (OUTPATIENT)
Dept: MRI IMAGING | Age: 61
DRG: 882 | End: 2021-11-11
Attending: STUDENT IN AN ORGANIZED HEALTH CARE EDUCATION/TRAINING PROGRAM
Payer: COMMERCIAL

## 2021-11-11 LAB
EKG ATRIAL RATE: 70 BPM
EKG P AXIS: 55 DEGREES
EKG P-R INTERVAL: 158 MS
EKG Q-T INTERVAL: 450 MS
EKG QRS DURATION: 96 MS
EKG QTC CALCULATION (BAZETT): 486 MS
EKG R AXIS: 36 DEGREES
EKG T AXIS: 100 DEGREES
EKG VENTRICULAR RATE: 70 BPM
GLUCOSE BLD-MCNC: 235 MG/DL (ref 65–105)
GLUCOSE BLD-MCNC: 269 MG/DL (ref 65–105)
GLUCOSE BLD-MCNC: 269 MG/DL (ref 65–105)
GLUCOSE BLD-MCNC: 342 MG/DL (ref 65–105)
PHOSPHORUS: 3.3 MG/DL (ref 2.6–4.5)

## 2021-11-11 PROCEDURE — 97162 PT EVAL MOD COMPLEX 30 MIN: CPT

## 2021-11-11 PROCEDURE — 97530 THERAPEUTIC ACTIVITIES: CPT

## 2021-11-11 PROCEDURE — 95819 EEG AWAKE AND ASLEEP: CPT

## 2021-11-11 PROCEDURE — 82947 ASSAY GLUCOSE BLOOD QUANT: CPT

## 2021-11-11 PROCEDURE — 2060000000 HC ICU INTERMEDIATE R&B

## 2021-11-11 PROCEDURE — 97166 OT EVAL MOD COMPLEX 45 MIN: CPT

## 2021-11-11 PROCEDURE — 6370000000 HC RX 637 (ALT 250 FOR IP): Performed by: FAMILY MEDICINE

## 2021-11-11 PROCEDURE — 99223 1ST HOSP IP/OBS HIGH 75: CPT | Performed by: FAMILY MEDICINE

## 2021-11-11 PROCEDURE — 36415 COLL VENOUS BLD VENIPUNCTURE: CPT

## 2021-11-11 PROCEDURE — 92523 SPEECH SOUND LANG COMPREHEN: CPT

## 2021-11-11 PROCEDURE — 84100 ASSAY OF PHOSPHORUS: CPT

## 2021-11-11 PROCEDURE — 93010 ELECTROCARDIOGRAM REPORT: CPT | Performed by: INTERNAL MEDICINE

## 2021-11-11 PROCEDURE — 2580000003 HC RX 258: Performed by: FAMILY MEDICINE

## 2021-11-11 PROCEDURE — 99255 IP/OBS CONSLTJ NEW/EST HI 80: CPT | Performed by: PSYCHIATRY & NEUROLOGY

## 2021-11-11 PROCEDURE — 70551 MRI BRAIN STEM W/O DYE: CPT

## 2021-11-11 RX ADMIN — INSULIN GLARGINE 53 UNITS: 100 INJECTION, SOLUTION SUBCUTANEOUS at 23:26

## 2021-11-11 RX ADMIN — FUROSEMIDE 80 MG: 40 TABLET ORAL at 16:58

## 2021-11-11 RX ADMIN — GABAPENTIN 300 MG: 300 CAPSULE ORAL at 23:27

## 2021-11-11 RX ADMIN — INSULIN LISPRO 12 UNITS: 100 INJECTION, SOLUTION INTRAVENOUS; SUBCUTANEOUS at 11:49

## 2021-11-11 RX ADMIN — FUROSEMIDE 80 MG: 40 TABLET ORAL at 08:23

## 2021-11-11 RX ADMIN — BUSPIRONE HYDROCHLORIDE 7.5 MG: 5 TABLET ORAL at 16:58

## 2021-11-11 RX ADMIN — TAMSULOSIN HYDROCHLORIDE 0.4 MG: 0.4 CAPSULE ORAL at 08:23

## 2021-11-11 RX ADMIN — FEBUXOSTAT 40 MG: 40 TABLET, FILM COATED ORAL at 08:24

## 2021-11-11 RX ADMIN — SODIUM CHLORIDE, PRESERVATIVE FREE 10 ML: 5 INJECTION INTRAVENOUS at 23:33

## 2021-11-11 RX ADMIN — SODIUM CHLORIDE, PRESERVATIVE FREE 10 ML: 5 INJECTION INTRAVENOUS at 08:24

## 2021-11-11 RX ADMIN — APIXABAN 5 MG: 5 TABLET, FILM COATED ORAL at 08:22

## 2021-11-11 RX ADMIN — ASPIRIN 81 MG: 81 TABLET, CHEWABLE ORAL at 08:23

## 2021-11-11 RX ADMIN — RANOLAZINE 1000 MG: 500 TABLET, FILM COATED, EXTENDED RELEASE ORAL at 08:22

## 2021-11-11 RX ADMIN — TRAZODONE HYDROCHLORIDE 75 MG: 50 TABLET ORAL at 23:28

## 2021-11-11 RX ADMIN — ACETAMINOPHEN 650 MG: 325 TABLET ORAL at 12:50

## 2021-11-11 RX ADMIN — INSULIN LISPRO 9 UNITS: 100 INJECTION, SOLUTION INTRAVENOUS; SUBCUTANEOUS at 16:58

## 2021-11-11 RX ADMIN — APIXABAN 5 MG: 5 TABLET, FILM COATED ORAL at 23:28

## 2021-11-11 RX ADMIN — CARVEDILOL 6.25 MG: 6.25 TABLET, FILM COATED ORAL at 23:28

## 2021-11-11 RX ADMIN — GABAPENTIN 300 MG: 300 CAPSULE ORAL at 08:22

## 2021-11-11 RX ADMIN — ATORVASTATIN CALCIUM 80 MG: 80 TABLET, FILM COATED ORAL at 08:23

## 2021-11-11 RX ADMIN — INSULIN LISPRO 6 UNITS: 100 INJECTION, SOLUTION INTRAVENOUS; SUBCUTANEOUS at 08:26

## 2021-11-11 RX ADMIN — BUSPIRONE HYDROCHLORIDE 7.5 MG: 5 TABLET ORAL at 23:29

## 2021-11-11 RX ADMIN — INSULIN GLARGINE 53 UNITS: 100 INJECTION, SOLUTION SUBCUTANEOUS at 08:48

## 2021-11-11 RX ADMIN — PANTOPRAZOLE SODIUM 40 MG: 40 TABLET, DELAYED RELEASE ORAL at 05:30

## 2021-11-11 RX ADMIN — RANOLAZINE 1000 MG: 500 TABLET, FILM COATED, EXTENDED RELEASE ORAL at 23:28

## 2021-11-11 ASSESSMENT — PAIN SCALES - GENERAL
PAINLEVEL_OUTOF10: 5
PAINLEVEL_OUTOF10: 6
PAINLEVEL_OUTOF10: 4

## 2021-11-11 ASSESSMENT — PAIN DESCRIPTION - PAIN TYPE: TYPE: ACUTE PAIN

## 2021-11-11 ASSESSMENT — PAIN DESCRIPTION - LOCATION: LOCATION: LEG

## 2021-11-11 ASSESSMENT — PAIN DESCRIPTION - ORIENTATION: ORIENTATION: RIGHT

## 2021-11-11 ASSESSMENT — PAIN - FUNCTIONAL ASSESSMENT: PAIN_FUNCTIONAL_ASSESSMENT: 0-10

## 2021-11-11 NOTE — CONSULTS
[facility]  FRANCISCO Harrison [address], [address] [facility]  Ph: [phone] or [phone]  FAX: [phone]        Reason for consult: Encephalopathy    I had the pleasure of seeing your patient in neurology consultation for her symptoms. As you would recall Faina Hernandez is a 59 y.o. yo female admitted on 11/10/2021. The history is obtained from the patient and medical records. As per the patient's sister, she was at the baseline until 8/1/2021 when she was at her friend's baby shower. She had a sudden onset of left-sided weakness for which she was taken to [facility].  Reviewing the records, upon arrival to the ER, she was drowsy and groggy slightly disoriented and underwent a stroke alert subsequent to getting CT head, CT angiogram head and neck and CT perfusion. The work-up was negative except for some intracranial atherosclerosis. The patient's mental status improved and she was discharged from the hospital.  As per the sister, the patient also underwent MRI scan of the brain which reportedly showed a frontal stroke. The patient was discharged to [facility] rehab. And eventually was discharged home. The patient then had an episode on September 13, 2021 of a subtle amnesia, questionable right-sided weakness and was brought into [facility] for which he underwent MRI scan of the brain which was negative. The EEG of the brain was done which was also negative. She was then discharged. As per the sister, the patient did not regain her memory and continues to have sporadic amnesia, unable to recognize her family members with slight but incomplete improvement of her memory especially with prompting. Yesterday, while she was going to her hemodialysis, she complained of having that sharp pain in the right upper extremity followed by weakness and pain in the right lower extremity and during dialysis became unresponsive and confused.   The patient had transient amnesia similar to September 2021's episode. She believes her right lower extremity strength has not improved. Her memory is improving somewhat. There is no previous history of seizures. The patient denies having any history of anxiety, depression. 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 She has a past medical history of atrial fibrillation for which she has been taking Eliquis. CT head, CT angiogram are done. CT head is negative. CT angiogram has shown 70% right ICA intracranial atherosclerosis.   Past Medical History:   Diagnosis Date    Backache, unspecified     CHF (congestive heart failure) (HCC)     Chronic kidney disease     Coronary atherosclerosis of artery bypass graft     Cramp of limb     Gallstones     Hypertension     Insomnia     Pneumonia     Type II or unspecified type diabetes mellitus with renal manifestations, not stated as uncontrolled(250.40)     Type II or unspecified type diabetes mellitus without mention of complication, not stated as uncontrolled     Unspecified vitamin D deficiency      Past Surgical History:   Procedure Laterality Date    ANKLE FRACTURE SURGERY      BREAST SURGERY      CARPAL TUNNEL RELEASE      CHOLECYSTECTOMY, OPEN N/A     CORONARY ARTERY BYPASS GRAFT      x3    HAND SURGERY      IR TUNNELED CATHETER PLACEMENT GREATER THAN 5 YEARS  8/18/2021    IR TUNNELED CATHETER PLACEMENT GREATER THAN 5 YEARS 8/18/2021 [facility] SPECIAL PROCEDURES    KNEE ARTHROSCOPY      right    TONSILLECTOMY       Social History     Socioeconomic History    Marital status: Single     Spouse name: Not on file    Number of children: Not on file    Years of education: Not on file    Highest education level: Not on file   Occupational History    Not on file   Tobacco Use    Smoking status: Never Smoker    Smokeless tobacco: Never Used   Vaping Use    Vaping Use: Never used Substance and Sexual Activity    Alcohol use: No    Drug use: No    Sexual activity: Not Currently   Other Topics Concern    Not on file   Social History Narrative    Not on file     Social Determinants of Health     Financial Resource Strain: Low Risk     Difficulty of Paying Living Expenses: Not hard at all   Food Insecurity:     Worried About [address] [employer] in the Last Year: Not on file    Nany of Food in the Last Year: Not on file   Transportation Needs:     Lack of Transportation (Medical): Not on file    Lack of Transportation (Non-Medical):  Not on file   Physical Activity:     Days of Exercise per Week: Not on file    Minutes of Exercise per Session: Not on file   Stress:     Feeling of Stress : Not on file   Social Connections:     Frequency of Communication with Friends and Family: Not on file    Frequency of Social Gatherings with Friends and Family: Not on file    Attends Baptism Services: Not on file    Active Member of [address] [place] [address] or Organizations: Not on file    Attends Club or Organization Meetings: Not on file    Marital Status: Not on file   Intimate Partner Violence:     Fear of Current or Ex-Partner: Not on file    Emotionally Abused: Not on file    Physically Abused: Not on file    Sexually Abused: Not on file   Housing Stability:     Unable to Pay for Housing in the Last Year: Not on file    Number of [place] in the Last Year: Not on file    Unstable Housing in the Last Year: Not on file     Family History   Problem Relation Age of Onset    Diabetes Father     Heart Failure Father       Allergies   Allergen Reactions    Adhesive Tape Other (See Comments) and Rash     Other reaction(s): Unknown    Ace Inhibitors Other (See Comments)     cough    Iv Dye [Iodides]      Stage 4 kidney disease    Metformin And Related Hives, Itching and Rash      BP (!) 145/59   Pulse 67   Temp 97.7 °F (36.5 °C) (Oral)   Resp 14   Ht 5' 4\" (1.626 m)   Wt 240 lb (108.9 kg)   SpO2 93%   BMI 41.20 kg/m²      ROS:  Constitutional Negative for fever and chills   HEENT Negative for ear discharge, ear pain, nosebleed   Eyes Negative for photophobia, pain and discharge   Respiratory Negative for hemoptysis and sputum   Cardiovascular Negative for orthopnea, claudication and PND   Gastrointestinal Negative for abdominal pain, diarrhea, blood in stool   Musculoskeletal Negative for joint pain, negative for myalgia   Skin Negative for rash or itching   Endo/heme/allergies Negative for polydipsia, environmental allergy   Psychiatric/behavioral Negative for suicidal ideation.   Patient is not anxious   General examination:    Head: Normocephalic, atraumatic  Eyes: Extraocular movements intact  Lungs: Respirations unlabored, chest wall no deformity  ENT: Normal external ear canals, no sinus tenderness  Heart: Regular rate rhythm  Abdomen: No masses, tenderness  Extremities: No cyanosis or edema, 2+ pulses  Skin: Intact, normal skin color    Neurological examination:    Mental status   Alert and oriented; intact memory with no confusion, speech or language problems; no hallucinations or delusions     Cranial nerves   II - visual fields intact to confrontation                                                III, IV, VI - extra-ocular muscles full: no pupillary defect; no CECELIA, no nystagmus, no ptosis                                                                      V - normal facial sensation                                                               VII - normal facial symmetry                                                             VIII - intact hearing                                                                             IX, X - symmetrical palate                                                                  XI - symmetrical shoulder shrug                                                       XII - midline tongue without atrophy or fasciculation     Motor function  Normal muscle bulk and tone; normal power 5/5 in all extremities except right lower extremity, pain giveaway weakness     Sensory function  decrease sensation in the right lower extremity   Cerebellar Intact fine motor movement. No involuntary movements or tremors     Reflex function Intact 2+ DTR and symmetric. Negative Babinski     Gait                  Not tested         Lab Results   Component Value Date    LDLCALC 28 04/09/2015    LDLCHOLESTEROL 72 12/26/2012     No components found for: CHLPL  Lab Results   Component Value Date    TRIG 168 04/09/2015    TRIG 266 02/12/2014    TRIG 218 (H) 12/26/2012     Lab Results   Component Value Date    HDL 43 04/09/2015    HDL 42 02/12/2014    HDL 43 12/26/2012     Lab Results   Component Value Date    LDLCALC 28 04/09/2015    LDLCALC 60 02/12/2014     No results found for: LABVLDL  Lab Results   Component Value Date    LABA1C 8.9 (H) 03/15/2021     Lab Results   Component Value Date     03/15/2021     Lab Results   Component Value Date    REVSCYQU92 932 12/26/2012      Neurological work up:  CT head unremarkable   CTA head and neck 70% supraclinoid right ICA stenosis  MRI brain 9/13/2021 normal  2 D echo     Assessment and Recommendations:   Patient with a history of possible stroke in August 2021 and recurrent episodes of amnesia   70% supraclinoid right ICA stenosis, likely clinically asymptomatic      I have reviewed patient's records from [facility] in details including the discharge summary, CT head, CT angiogram and CT perfusion scan. I could not find MRI scan reports or films in there. The work-up in the hospital essentially unremarkable. A subsequent neurological work-up last month including MRI scan of the brain and EEG which were also negative. There was no evidence of large cortical ischemic stroke acute or subacute. The patient's symptoms are rather peculiar. It would be very unusual presentation for stroke to present with recurrent amnesia. The differential diagnosis includes possible seizure versus nonorganic etiologies i.e. fuge/stress-induced    I will obtain a repeat EEG brain and an MRI scan of the brain. If both the studies are negative and patient continues to improve, I would clinically watch at this time. If there is a continued concern of recurrent amnesia, at some point, spinal tap may be considered to rule out encephalitis, although less likely    Will follow. Thank you for the consult. Sadaf Sadler MD  Neurology    This note is created with the assistance of a speech-recognition program. While intending to generate a document that actually reflects the content of the visit, the document can still have some errors including those of syntax and sound a- like substitutions which may escape proofreading. In such instances, actual meaning can be extrapolated by contextual derivation.

## 2021-11-11 NOTE — H&P
Family Medicine Admit Note    PCP: AFSANEH Mark CNP    Date of Admission: 11/10/2021    Date of Service: Pt seen/examined on 11/11/21 and Admitted to Inpatient. Chief Complaint:  Altered Mental Status                                  Fatigue                                  Headache      History Of Present Illness: The patient is a 59 y.o. female who presents to [facility] with reports of an episode of altered mental status. She was at dialysis on Thursday and complained of right upper extremity and right lower extremity weakness - heaviness. She also is suffering memory loss. Upon arrival to the ED, the patient cannot recall her last name or date of birth. In August, she had an acute lacunar infarct and did rehab at [address] [place] [address]. In September, she had a similar episode with altered mental status while at dialysis. This was attributed to hypoperfusion and she again did rehab in [facility]U. She is awake and in no distress this am. She denies any fevers, chills, rhinorrhea, congestion, sore throat, cough, palpitations, abdominal pain, N/V, diarrhea, blood in stools, dysuria, hematuria, headaches or dizziness.      Past Medical History:        Diagnosis Date    Backache, unspecified     CHF (congestive heart failure) (HCC)     Chronic kidney disease     Coronary atherosclerosis of artery bypass graft     Cramp of limb     Gallstones     Hypertension     Insomnia     Pneumonia     Type II or unspecified type diabetes mellitus with renal manifestations, not stated as uncontrolled(250.40)     Type II or unspecified type diabetes mellitus without mention of complication, not stated as uncontrolled     Unspecified vitamin D deficiency        Past Surgical History:        Procedure Laterality Date    ANKLE FRACTURE SURGERY      BREAST SURGERY      CARPAL TUNNEL RELEASE      CHOLECYSTECTOMY, OPEN N/A     CORONARY ARTERY BYPASS GRAFT      x3    HAND SURGERY      IR TUNNELED CATHETER PLACEMENT GREATER THAN 5 YEARS  8/18/2021    IR TUNNELED CATHETER PLACEMENT GREATER THAN 5 YEARS 8/18/2021 [facility] SPECIAL PROCEDURES    KNEE ARTHROSCOPY      right    TONSILLECTOMY         Medications Prior to Admission:    Prior to Admission medications    Medication Sig Start Date End Date Taking? Authorizing Provider   atorvastatin (LIPITOR) 80 MG tablet Take 1 tablet by mouth daily 11/3/21  Yes Walter Salazar, APRN - CNP   aspirin 81 MG chewable tablet Take 1 tablet by mouth daily 9/25/21  Yes Dorothy Morales MD   ranolazine (RANEXA) 1000 MG extended release tablet Take 1 tablet by mouth 2 times daily 9/25/21  Yes Dorothy Morales MD   busPIRone (BUSPAR) 7.5 MG tablet Take 1 tablet by mouth 3 times daily 9/25/21  Yes Dorothy Morales MD   ELIQUIS 5 MG TABS tablet Take 1 tablet by mouth 2 times daily 9/25/21  Yes Dorothy Morales MD   gabapentin (NEURONTIN) 300 MG capsule Take 1 capsule by mouth 2 times daily for 30 days. 9/25/21 11/10/21 Yes Dorothy Morales MD   traZODone (DESYREL) 50 MG tablet Take 1.5 tablets by mouth nightly 9/25/21  Yes Dorothy Morales MD   insulin glargine Kelli Amelia) 100 UNIT/ML injection pen Inject 53 Units into the skin 2 times daily 9/25/21  Yes Dorothy Morales MD   carvedilol (COREG) 6.25 MG tablet Take 1 tablet by mouth 2 times daily 9/25/21  Yes Dorothy Morales MD   furosemide (LASIX) 80 MG tablet Take 1 tablet by mouth 2 times daily 9/25/21  Yes Dorothy Morales MD   febuxostat (ULORIC) 40 MG TABS tablet Take 1 tablet by mouth daily 9/25/21  Yes Dorothy Morales MD   pantoprazole (PROTONIX) 40 MG tablet Take 1 tablet by mouth every morning (before breakfast) 9/25/21  Yes Dorothy Morales MD   insulin lispro, 1 Unit Dial, (HUMALOG KWIKPEN) 100 UNIT/ML SOPN Per sliding scale:  If <139  No Insulin; 140-199  2 Units; 200-249  4 Units; 250-299  6 Units; 300-349  8 Units; 350-400  10 Units;  Above 400  12 Units 9/25/21  Yes Dorothy Morales MD   blood glucose test strips (DEN CONTOUR TEST) strip 1 each by In Vitro route 3 times daily 8/25/21  Yes Willie Pratt MD   ferrous sulfate (BRIAN-ARCHIE) 325 (65 Fe) MG tablet Take 1 tablet by mouth 2 times daily (with meals) 8/25/21  Yes Willie Pratt MD   senna (SENOKOT) 8.6 MG tablet Take 2 tablets by mouth daily as needed (Constipation) 8/25/21  Yes Willie Pratt MD   tamsulosin (FLOMAX) 0.4 MG capsule Take 1 capsule by mouth daily 11/10/21   AFSANEH Haney CNP   Insulin Pen Needle (BD ULTRA-FINE PEN NEEDLES) 29G X 12.7MM MISC Use one needle for each insulin injection. 9/25/21   Willie Pratt MD   acetaminophen (TYLENOL) 325 MG tablet Take 2 tablets by mouth every 4 hours as needed for Pain 8/25/21   Willie Pratt MD   Alcohol Swabs 70 % PADS Use an alcohol swab to cleanse the skin before checking your blood sugar and before each insulin injection. 8/25/21   Willie Pratt MD   sharps container 1 each by Does not apply route as needed (dirty pen needles) 4/19/21   AFSANEH Haney CNP   allopurinol (ZYLOPRIM) 100 MG tablet Take 1 tablet by mouth daily 4/14/21   AFSANEH Haney CNP       Allergies:  Adhesive tape, Ace inhibitors, Iv dye [iodides], and Metformin and related    Social History:  The patient currently lives at home    TOBACCO:   reports that she has never smoked. She has never used smokeless tobacco.  ETOH:   reports no history of alcohol use.     Review of Systems - General ROS: positive for  - obesity  Psychological ROS: positive for - anxiety  Ophthalmic ROS: positive for - uses glasses  ENT ROS: negative  Endocrine ROS: positive for - hyperglycemia  Respiratory ROS: negative  Cardiovascular ROS: negative  Gastrointestinal ROS: negative  Musculoskeletal ROS: positive for - muscular weakness  Neurological ROS: positive for - memory loss and numbness/tingling      Family History:          Problem Relation Age of Onset    Diabetes Father     Heart Failure Father        PHYSICAL EXAM:    BP (!) 110/38   Pulse 52   Temp 97.9 °F (36.6 °C)   Resp 18   Ht 5' 4\" (1.626 m)   Wt 240 lb (108.9 kg)   SpO2 96% Comment: room air  BMI 41.20 kg/m²     General appearance: No apparent distress appears stated age and cooperative. Obese. HEENT Normal cephalic, atraumatic without obvious deformity. Pupils equal, round, and reactive to light. Extra ocular muscles intact. Conjunctivae/corneas clear. Neck: Supple, No jugular venous distention/bruits. Trachea midline without thyromegaly or adenopathy with full range of motion. Lungs: Clear to auscultation, bilaterally without Rales/Wheezes/Rhonchi with good respiratory effort. Heart: Regular rate and rhythm with Normal S1/S2 without murmurs, rubs or gallops, point of maximum impulse non-displaced  Abdomen: Soft, non-tender or non-distended without rigidity or guarding and positive bowel sounds all four quadrants. Extremities: No clubbing, cyanosis, or edema bilaterally. Full range of motion without deformity and normal gait intact. Skin: Skin color, texture, turgor normal.  No rashes or lesions. Neurologic: Alert and oriented X 3, neurovascularly intact with sensory/motor intact upper extremities/lower extremities, bilaterally. Cranial nerves: II-XII intact, grossly non-focal.  Mental status: Alert, thought content appropriate. CXR:  I have reviewed the CXR with the following interpretation: low lung volumes  EKG:  I have reviewed the EKG with the following interpretation: NSR    CBC   Recent Labs     11/10/21  1550   WBC 5.1   HGB 13.0   HCT 37.0         RENAL  Recent Labs     11/10/21  1550 11/10/21  1559     --    K 4.0  --      --    CO2 22  --    BUN 16  --    CREATININE 1.42* 1.18     LFT'S  No results for input(s): AST, ALT, ALB, BILIDIR, BILITOT, ALKPHOS in the last 72 hours. COAG  Recent Labs     11/10/21  1550   INR 0.9     CARDIAC ENZYMES  No results for input(s): CKTOTAL, CKMB, CKMBINDEX, TROPONINI in the last 72 hours.     U/A:    Lab Results   Component Value Date    COLORU YELLOW 09/14/2021    WBCUA 0 TO 2 09/14/2021    RBCUA None 09/14/2021    MUCUS NOT REPORTED 09/14/2021    BACTERIA NOT REPORTED 09/14/2021    SPECGRAV 1.006 09/14/2021    LEUKOCYTESUR TRACE 09/14/2021    GLUCOSEU NEGATIVE 09/14/2021    AMORPHOUS NOT REPORTED 09/14/2021       ABG  No results found for: Brice Moran, [id number], PHART, KAIDEN, Finn [place], [place]        Active Hospital Problems    Diagnosis Date Noted    Anxiety [F41.9] 09/30/2021    Disequilibrium syndrome [E87.8] 09/17/2021    Morbid obesity with BMI of 40.0-44.9, adult ([facility] [E66.01, Z68.41] 09/14/2021    Stroke-like symptoms [R29.90] 09/13/2021    Essential hypertension [I10] 05/03/2021    Anemia in stage 4 chronic kidney disease ([facility] [N18.4, D63.1] 05/03/2021    Chronic diastolic heart failure ([facility] [I50.32] 09/20/2018    Type 2 diabetes mellitus with kidney complication, with long-term current use of insulin ([facility] [E11.29, Z79.4] 09/20/2018    Mixed hyperlipidemia [E78.2] 07/27/2016         ASSESSMENT/PLAN:  Patient admitted with Stroke-like symptoms. Co-morbidities as above. Orders Placed This Encounter   Procedures    CT Head WO Contrast    CTA HEAD NECK W CONTRAST    XR CHEST PORTABLE    MRI BRAIN WO CONTRAST    CBC Auto Differential    Basic Metabolic Prof    Magnesium    Troponin    Brain Natriuretic Peptide    Protime-INR    APTT    Troponin    PHOSPHORUS    CHLORIDE, URINE, RANDOM    Protein / Creatinine Ratio, Urine    SODIUM, URINE, RANDOM    PROTEIN, URINE, TIMED    CREATININE, URINE, TIMED    ADULT DIET;  Regular; 5 carb choices (75 gm/meal)    Vital signs per unit routine    Notify physician    Notify physician    Up with assistance    Telemetry monitoring - continuous duration    HYPOGLYCEMIA TREATMENT: blood glucose less than 50 mg/dL and patient  ALERT and TOLERATING PO    HYPOGLYCEMIA TREATMENT: blood glucose less than 70 mg/dL and patient ALERT and TOLERATING PO    HYPOGLYCEMIA TREATMENT: blood glucose less than 70 mg/dL and patient NOT ALERT or NPO    Turn or assist with turn approximately every 2 hours if patient is unable to turn self. Remind patient to turn if necessary.  Assess skin per unit guidelines    Pad/offload medical devices    Maintain HOB at the lowest elevation consistent with medical plan of care    Use lift equipment for lifting patient    Maintain heels off of bed at all times    Full Code    Inpatient consult to Primary Care Provider    Inpatient consult to Neurology    Inpatient consult to Nephrology    Inpatient consult to PM&R - Physiatry    Contact isolation    OT eval and treat    PT eval and treat    Speech-Language Pathology (SLP) Eval and Treat    Creatinine W/GFR Point of Care    POCT glucose    POCT Glucose    POC Glucose Fingerstick    POC Glucose Fingerstick    POC Glucose Fingerstick    POC Glucose Fingerstick    POC Glucose Fingerstick    EKG 12 Lead    EEG    EEG    Hemodialysis    PATIENT STATUS (DIRECT) Inpatient    PATIENT STATUS (FROM ED OR OR/PROCEDURAL) Inpatient         DVT Prophylaxis:   Diet: ADULT DIET;  Regular; 5 carb choices (75 gm/meal)  Code Status: Full Code      Dispo - admitted to [facility]       @[facility]@

## 2021-11-11 NOTE — PROGRESS NOTES
Dr. Zena Butler paged through [facility] for additional orders. Additional orders received.

## 2021-11-11 NOTE — PROGRESS NOTES
is recommended. CT brain- 11/10- nothing acute     Primary Complaint:   Per telestroke:  Edwin Singh is a 59 y.o. female with past medical history of coronary artery disease on dual antiplatelets, recent left frontal stroke with residual mild right-sided weakness, hypertension, hyperlipidemia, chronic kidney disease on dialysis, DVT on Eliquis, went to the hospital at 3 PM for dialysis. Stroke alert was called around 4 PM for dysarthria, worsening right-sided weakness, confusion. Last known well 3 PM. On dual antiplatelets and Eliquis. Systolic blood pressure was 160. Blood sugar within normal limits. NIHSS of 8 for dysarthria, right-sided weakness and confusion. Stat CT head was done and was negative for acute changes. CTA of the head and neck was unremarkable except for 70% stenosis of the right ICA and 60% stenosis of the left ICA. tPA was not given as the patient is on Eliquis that was confirmed anh. The patient will be admitted to the hospital for stroke work-up. Pain:  Pain Assessment  Pain Assessment: 0-10  Pain Level: 5  Pain Type: Acute pain  Pain Location: Leg  Pain Orientation: Right    Assessment:      Diagnosis: Pt. demonstrated functional speech and language, mildly impaired thought organization (long response latency), stating \"It should be easier than this to think of things\". Pt. demonstrated intact short term recall (for events happening in last 2 weeks up to today) but moderately impaired long term recall (unable to recall orientation information, president, what some words and ideas mean, details about family members, what she collects at home). No overt s/s aspiration demonstrated when pt. taking sips of water via cup. ST recommended to improve cognitive processing. Recommendations:  Requires SLP Intervention: Yes             Plan:   Goals:  Short-term Goals  Goal 1: Increase orientation to 90%  accuracy  Goal 2:  Increase divergent naming to 14 items in 60 seconds  Goal 3:

## 2021-11-11 NOTE — VIRTUAL HEALTH
Consults  Patient Location:  [facility]    Provider Location (City/State): [place]    This virtual visit was conducted via interactive/real-time audio/video. [address] [facility] Stroke and Vascular Neurology Consult for  [facility] Stroke Alert through [address] @4:05 PM  11/10/2021 7:44 PM  Pt Name: Faina Hernandez  MRN: [id number]  YOB: 1960  Date of evaluation: 11/10/2021  Primary Care Physician: AFSANEH Haney CNP  Reason for Evaluation: Stroke Evaluation with Discussion with Ed or primary team with Telemedicine and stroke evaluation with Review of imaging and labs    Faina Hernandez is a 59 y.o. female with past medical history of coronary artery disease on dual antiplatelets, recent left frontal stroke with residual mild right-sided weakness, hypertension, hyperlipidemia, chronic kidney disease on dialysis, DVT on Eliquis, went to the hospital at 3 PM for dialysis. Stroke alert was called around 4 PM for dysarthria, worsening right-sided weakness, confusion. Last known well 3 PM. On dual antiplatelets and Eliquis. Systolic blood pressure was 160. Blood sugar within normal limits. NIHSS of 8 for dysarthria, right-sided weakness and confusion. Stat CT head was done and was negative for acute changes. CTA of the head and neck was unremarkable except for 70% stenosis of the right ICA and 60% stenosis of the left ICA. tPA was not given as the patient is on Eliquis that was confirmed anh. The patient will be admitted to the hospital for stroke work-up. Of note, the patient presented to the hospital September/2021 for similar symptoms during dialysis, worsening right-sided weakness, confusion and slurred speech, MRI of the brain along with MRA of the head and neck were done and were unremarkable. EEG was done and showed mild slowing. Echo was done and showed ejection fraction of 55% with negative bubble study.  Patient was diagnosed with dialysis disequilibrium syndrome. The pt was improving and then discharged home. LKW: 3 pm  NIH:  8    Allergies  is allergic to adhesive tape, ace inhibitors, iv dye [iodides], and metformin and related. Medications  Prior to Admission medications    Medication Sig Start Date End Date Taking? Authorizing Provider   atorvastatin (LIPITOR) 80 MG tablet Take 1 tablet by mouth daily 11/3/21  Yes Jason Crawford, APRN - CNP   aspirin 81 MG chewable tablet Take 1 tablet by mouth daily 9/25/21  Yes Maya Sanchez MD   ranolazine (RANEXA) 1000 MG extended release tablet Take 1 tablet by mouth 2 times daily 9/25/21  Yes Maya Mode, MD   busPIRone (BUSPAR) 7.5 MG tablet Take 1 tablet by mouth 3 times daily 9/25/21  Yes Maya Daniel, MD   ELIQUIS 5 MG TABS tablet Take 1 tablet by mouth 2 times daily 9/25/21  Yes Maya MD Daniel   gabapentin (NEURONTIN) 300 MG capsule Take 1 capsule by mouth 2 times daily for 30 days.  9/25/21 11/10/21 Yes Maya Sanchez MD   traZODone (DESYREL) 50 MG tablet Take 1.5 tablets by mouth nightly 9/25/21  Yes Maya MD Daniel   insulin glargine [facility]) 100 UNIT/ML injection pen Inject 53 Units into the skin 2 times daily 9/25/21  Yes Maya Sanchez MD   carvedilol (COREG) 6.25 MG tablet Take 1 tablet by mouth 2 times daily 9/25/21  Yes Maya MD Daniel   furosemide (LASIX) 80 MG tablet Take 1 tablet by mouth 2 times daily 9/25/21  Yes Maya MD Daniel   febuxostat (ULORIC) 40 MG TABS tablet Take 1 tablet by mouth daily 9/25/21  Yes Maya Daniel, MD   tamsulosin [facility]) 0.4 MG capsule Take 1 capsule by mouth daily 9/25/21  Yes Maya MD Daniel   pantoprazole (PROTONIX) 40 MG tablet Take 1 tablet by mouth every morning (before breakfast) 9/25/21  Yes Maya Sanchez MD   insulin lispro, 1 Unit Dial, (HUMALOG KWIKPEN) 100 UNIT/ML SOPN Per sliding scale:  If <139  No Insulin; 140-199  2 Units; 200-249  4 Units; 250-299  6 Units; 300-349  8 Units; 350-400  10 Units; Above 400  12 Units 9/25/21  Yes Renay Sherman MD   Insulin Pen Needle (BD ULTRA-FINE PEN NEEDLES) 29G X 12.7MM MISC Use one needle for each insulin injection. 9/25/21   Renay Sherman MD   acetaminophen (TYLENOL) 325 MG tablet Take 2 tablets by mouth every 4 hours as needed for Pain 8/25/21   Renay Sherman MD   blood glucose test strips (DEN CONTOUR TEST) strip 1 each by In Vitro route 3 times daily 8/25/21   Renay Sherman MD   ferrous sulfate (BRIAN-ARCHIE) 325 (65 Fe) MG tablet Take 1 tablet by mouth 2 times daily (with meals) 8/25/21   Renay Sherman MD   senna (SENOKOT) 8.6 MG tablet Take 2 tablets by mouth daily as needed (Constipation) 8/25/21   Renay Sherman MD   Alcohol Swabs 70 % PADS Use an alcohol swab to cleanse the skin before checking your blood sugar and before each insulin injection.  8/25/21   Renay Sherman MD   sharps container 1 each by Does not apply route as needed (dirty pen needles) 4/19/21   AFSANEH Short CNP   allopurinol (ZYLOPRIM) 100 MG tablet Take 1 tablet by mouth daily 4/14/21   AFSANEH Short CNP    Scheduled Meds:   aspirin  81 mg Oral Daily    atorvastatin  80 mg Oral Daily    busPIRone  7.5 mg Oral TID    carvedilol  6.25 mg Oral BID    apixaban  5 mg Oral BID    febuxostat  40 mg Oral Daily    ferrous sulfate  325 mg Oral BID WC    furosemide  80 mg Oral BID    gabapentin  300 mg Oral BID    insulin glargine  53 Units SubCUTAneous BID    insulin lispro  0-18 Units SubCUTAneous TID     insulin lispro  0-9 Units SubCUTAneous Nightly    [START ON 11/11/2021] pantoprazole  40 mg Oral QAM AC    ranolazine  1,000 mg Oral BID    tamsulosin  0.4 mg Oral Daily    traZODone  75 mg Oral Nightly    sodium chloride flush  5-40 mL IntraVENous 2 times per day     Continuous Infusions:   sodium chloride       PRN Meds:.sodium chloride flush, sodium chloride flush, sodium chloride, acetaminophen, ondansetron **OR** ondansetron  Past Medical History   has a past medical history of Backache, unspecified, CHF (congestive heart failure) ([facility] [address] Chronic kidney disease, Coronary atherosclerosis of artery bypass graft, Cramp of limb, Gallstones, Hypertension, Insomnia, Pneumonia, Type II or unspecified type diabetes mellitus with renal manifestations, not stated as uncontrolled(250.40), Type II or unspecified type diabetes mellitus without mention of complication, not stated as uncontrolled, and Unspecified vitamin D deficiency. Social History  Social History     Socioeconomic History    Marital status: Single     Spouse name: Not on file    Number of children: Not on file    Years of education: Not on file    Highest education level: Not on file   Occupational History    Not on file   Tobacco Use    Smoking status: Never Smoker    Smokeless tobacco: Never Used   Vaping Use    Vaping Use: Never used   Substance and Sexual Activity    Alcohol use: No    Drug use: No    Sexual activity: Not Currently   Other Topics Concern    Not on file   Social History Narrative    Not on file     Social Determinants of Health     Financial Resource Strain: Low Risk     Difficulty of Paying Living Expenses: Not hard at all   Food Insecurity:     Worried About [address] in the Last Year: Not on file    [address] in the Last Year: Not on file   Transportation Needs:     Lack of Transportation (Medical): Not on file    Lack of Transportation (Non-Medical):  Not on file   Physical Activity:     Days of Exercise per Week: Not on file    Minutes of Exercise per Session: Not on file   Stress:     Feeling of Stress : Not on file   Social Connections:     Frequency of Communication with Friends and Family: Not on file    Frequency of Social Gatherings with Friends and Family: Not on file    Attends Synagogue Services: Not on file    Active Member of Clubs or Organizations: Not on file    Attends Club or Organization Meetings: Not on file    Marital Status: Not on file   Intimate Partner Violence:     Fear of Current or Ex-Partner: Not on file    Emotionally Abused: Not on file    Physically Abused: Not on file    Sexually Abused: Not on file   Housing Stability:     Unable to Pay for Housing in the Last Year: Not on file    Number of [place] in the Last Year: Not on file    Unstable Housing in the Last Year: Not on file     Family History      Problem Relation Age of Onset    Diabetes Father     Heart Failure Father        OBJECTIVE  BP (!) 140/83   Pulse 64   Temp 98.7 °F (37.1 °C) (Oral)   Resp 16   Ht 5' 4\" (1.626 m)   Wt 240 lb (108.9 kg)   SpO2 94%   BMI 41.20 kg/m²     NIH Stroke Scale  Interval: Baseline  Level of Consciousness (1a. ): Alert  LOC Questions (1b. ): Answers neither question correctly  LOC Commands (1c. ): Performs both tasks correctly  Best Gaze (2. ): Normal  Visual (3. ): No visual loss  Facial Palsy (4. ): (!) Minor paralysis  Motor Arm, Left (5a. ): No drift  Motor Arm, Right (5b. ): No effort against gravity, limb falls  Motor Leg, Left (6a. ): No drift  Motor Leg, Right (6b. ): No effort against gravity, limb falls  Limb Ataxia (7. ): (!) Present in one limb  Sensory (8. ): (!) Mild to Moderate  Best Language (9. ): Severe aphasia  Dysarthria (10. ): Normal  Extinction and Inattention (11): No abnormality  Total: 13  Pre-Morbid mRS: 1    Imaging:  Images were personally reviewed with VIZ. AI and PACS used to review images including:  CT brain without contrast: Neg for acute changes. CTA of the head and neck was unremarkable except for 70% stenosis of the right ICA and 60% stenosis of the left ICA.     Assessment   59 y.o. female with past medical history of coronary artery disease on dual antiplatelets, recent left frontal stroke with residual mild right-sided weakness, hypertension, hyperlipidemia, chronic kidney disease on dialysis, DVT on Eliquis, went to the hospital at 3 PM for dialysis. Stroke alert was called around 4 PM for dysarthria, worsening right-sided weakness, confusion. Differential DDx:  1. Rule out stroke  2. Possible dialysis disequilibrium syndrome. Recommendations:  1. NIH 8  2. Recommend Inpatient Neurology Consult for further assessment and evaluation   3. Ok to resume antiplatelet/anticoagulation tod  4. MRI of the brain without contrast.  5. Lipid panel/hemoglobin A1c  6. Lipitor 80 mg  7. PT/OT/SLP  8. Permissive HTN  9. Neurology consultation. Discussed with ED Physician and stroke team Dr. Saturnino Liang. At least 60 min of Telemedicine and time in conversation directly with ED staff and physician for the patient who is in imminent and life threatening deterioration without further treatment and evaluation. This Virtual Visit was conducted with patient's (and/or legal guardian's) consent, to provide telestroke consultation and necessary medical care.   Time spent examining patient, reviewing the images personally, reviewing the chart, perform high complexity decision making and speaking with the nursing staff regarding recommendations        Katelin Zhou MD,  Stroke, Neurocritical Care And/or [address] [place] [address] Stroke Network  [id number] [place]  Electronically signed 11/10/2021 at 7:44 PM

## 2021-11-11 NOTE — CARE COORDINATION
Writer sent a perfect serve message to Dr Katia James requesting an PMR consult.     Electronically signed by Carolina Shanks RN on 11/11/2021 at 4:45 PM

## 2021-11-11 NOTE — PROGRESS NOTES
Speech Language Pathology  [facility]  Speech Language Pathology    Date: 11/11/2021  Patient Name: Scott Mccarthy  YOB: 1960   AGE: 59 y.o. MRN: [id number]        Patient Not Available for Speech Therapy     Due to:  [x] Testing  [] Hemodialysis  [] Cancelled by RN  [] Surgery   [] Intubation/Sedation/Pain Medication  [] Medical instability  [] Other: EEG    Next scheduled treatment:  As able   Completed by:  BRAYAN Dorado, M.A., [address] [facility], [place], operated by [facility]

## 2021-11-11 NOTE — PROGRESS NOTES
Patient admitted per [address] VS taken and telemetry applied. NIHSS scale done by [facility] LU ZMARIA and Kvng Sainz. Patient alert. Forgetful. Bed alarm placed and patient instructed to not get out of bed per self.

## 2021-11-11 NOTE — FLOWSHEET NOTE
11/11/21 1521   Encounter Summary   Services provided to: Patient   Referral/Consult From: [facility]   (11/11/21V)   Volunteer Visit Yes   Complexity of Encounter Low   Length of Encounter 15 minutes   Spiritual/Protestant   Type Spiritual support   Intervention Communion; Prayer   Sacraments   Communion Patient received communion

## 2021-11-11 NOTE — PLAN OF CARE
Problem: Skin Integrity:  Goal: Will show no infection signs and symptoms  Description: Will show no infection signs and symptoms  Outcome: Ongoing  Note: Skin intact. Able to trn and reposition self. Goal: Absence of new skin breakdown  Description: Absence of new skin breakdown  Outcome: Ongoing     Problem: Falls - Risk of:  Goal: Will remain free from falls  Description: Will remain free from falls  Outcome: Ongoing  Note: Alert. Forgetful. Cooperative. Using call light appropriately. Bed alarm on. Right sided weakness. Goal: Absence of physical injury  Description: Absence of physical injury  Outcome: Ongoing     Problem: HEMODYNAMIC STATUS  Goal: Patient has stable vital signs and fluid balance  Outcome: Ongoing     Problem: ACTIVITY INTOLERANCE/IMPAIRED MOBILITY  Goal: Mobility/activity is maintained at optimum level for patient  Outcome: Ongoing     Problem: COMMUNICATION IMPAIRMENT  Goal: Ability to express needs and understand communication  Outcome: Ongoing  Note: Patient speech clear. SomeExpressive aphasia present.

## 2021-11-11 NOTE — PROGRESS NOTES
Physical Therapy    Facility/Department: [facility]  Initial Assessment    NAME: Taylor Cr  : 1960  MRN: [id number]    Date of Service: 2021    Discharge Recommendations:  Patient would benefit from continued therapy after discharge        Assessment   Body structures, Functions, Activity limitations: Decreased functional mobility ; Decreased strength; Decreased cognition; Decreased endurance; Decreased sensation; Decreased balance; Decreased coordination; Decreased posture; Increased pain; Decreased ROM  Assessment:  Pt with recent left frontal stroke with residual mild right-sided weakness, pt is a Hemodialysis pt. Presents with impaired cognition, increased Right side weakness and sensation deficits. P tneeds 2 person asist for bed mobility, and standing at EOB with Rolling walker. Pt uanbel to take steps with RW yet, pt very emotional/tearfull and fearfull. PT motivated and has good home support, will benefit from PM&R  consult for IRF placement . Treatment Diagnosis: Impaired mobility  Specific instructions for Next Treatment: co-tx with GARCIA/OTR  Prognosis: Fair  Decision Making: Medium Complexity  History: Hemodialysis pt, Recent CVA, Chronic back/neck pain. Clinical Presentation: emotional/tearfull. REQUIRES PT FOLLOW UP: Yes  Activity Tolerance  Activity Tolerance: Patient limited by fatigue; Patient limited by endurance; Patient limited by cognitive status; Other (Emotional)       Patient Diagnosis(es): The encounter diagnosis was Altered mental status, unspecified altered mental status type.      has a past medical history of Backache, unspecified, CHF (congestive heart failure) ([facility] [address] Chronic kidney disease, Coronary atherosclerosis of artery bypass graft, Cramp of limb, Gallstones, Hypertension, Insomnia, Pneumonia, Type II or unspecified type diabetes mellitus with renal manifestations, not stated as uncontrolled(250.40), Type II or unspecified type diabetes mellitus without mention of complication, not stated as uncontrolled, and Unspecified vitamin D deficiency. has a past surgical history that includes Coronary artery bypass graft; Knee arthroscopy; Carpal tunnel release; Breast surgery; Tonsillectomy; Hand surgery; Ankle fracture surgery; Cholecystectomy, open (N/A); and IR TUNNELED CVC PLACE WO SQ PORT/PUMP > 5 YEARS (8/18/2021). Restrictions  Restrictions/Precautions  Restrictions/Precautions: Contact Precautions, Fall Risk  Required Braces or Orthoses?: Yes  Required Braces or Orthoses  Right Lower Extremity Brace: ((Lymphedema wraps))  Left Lower Extremity Brace: ((Lymphedema wraps))  Position Activity Restriction  Other position/activity restrictions: Hemodialysis MWF  Vision/Hearing  Vision: Impaired  Vision Exceptions: Wears glasses for reading  Hearing: Within functional limits     Subjective  General  Patient assessed for rehabilitation services?: Yes  Additional Pertinent Hx: This is a 59 y.o. female with a significant past medical history of Chronic atrial fibrillation on Eliquis, Type 2 diabetes mellitus, essential systemic hypertension, coronary artery disease s/p CABG in 2004 and PTCA in 2019, heart failure with preserved ejection fraction LVEF 55% and end-stage renal disease secondary to diabetic and hypertensive nephropathy on routine hemodialysis on a Monday/Wednesday/Friday schedule at [address] dialysis unit on [facility] under the care of Dr. Mela Fox since August 2001 using IJ tunneled dialysis catheter, who presented to the hospital for further evaluation of acute on chronic right-sided weakness, Slurred speech and confusion. She was initially diagnosed with acute/subacute stroke in the left frontal lobe with right-sided weakness 4 months ago. Neurology consulted , MRI brain pending.    Referring Practitioner: Dr Grady Peer  Referral Date : 11/10/21  Diagnosis: Stroke-like symptoms  Follows Commands: Within Functional (degrees)  LLE AROM : WFL  Strength RLE  Comment: Grossly 2/5, knee extension 2-/5, knee flexion 2/5, DF 2/5  Strength LLE  Comment: Grossly 4/5  Strength RUE  Comment: See OT eal, decreased strengtha dn coordiantion noted. Sensation  Overall Sensation Status: Impaired  Additional Comments: Numbness in R foot. Hypersensivity in R lateral portion of calf. Pt reports decreased sensation un RUE. Bed mobility  Supine to Sit: 2 Person assistance; Minimal assistance  Sit to Supine: 2 Person assistance; Minimal assistance  Scooting: Moderate assistance (seated-scooting to EOB.)  Transfers  Sit to Stand: 2 Person Assistance; Moderate Assistance  Stand to sit: Moderate Assistance; 2 Person Assistance  Comment: Pt stood at bedside with RW, very fearfull, emotional, ramiro to WB slightly on R LE, able to slide feet towards HOB, but not able to pick her legs up for a step. Pt seems to have decreased motor planning, decreased coordination and strength for fucntional tasks.    Ambulation  Ambulation?: No     Balance  Posture: Fair  Sitting - Static: Fair  Sitting - Dynamic: Fair; -  Standing - Static: Fair; -  Standing - Dynamic: Poor  Comments: Standing with rolling walker        Plan   Plan  Times per week: 5 to 7 x/week  Specific instructions for Next Treatment: co-tx with GARCIA/OTR  Current Treatment Recommendations: Strengthening, Balance Training, Functional Mobility Training, Transfer Training, Endurance Training, Gait Training, Home Exercise Program, Safety Education & Training, Patient/Caregiver Education & Training  Safety Devices  Type of devices: Call light within reach, Gait belt, Left in bed    G-Code       OutComes Score                                                  AM-PAC Score     AM-PAC Inpatient Mobility without Stair Climbing Raw Score : 9 (11/11/21 1642)  AM-PAC Inpatient without Stair Climbing T-Scale Score : 32.44 (11/11/21 1642)  Mobility Inpatient CMS 0-100% Score: 76.07 (11/11/21 1642)  Mobility Inpatient without Stair CMS G-Code Modifier : CL (11/11/21 6669)       Goals  Short term goals  Time Frame for Short term goals: 7 to 8 visits  Short term goal 1: Pt able to perform supine>sit at [address] term goal 2: Pt able to perform sit to supine min A   Short term goal 3: Pt able to perform sit to stand and pivot transfers with rolling walker, min ax 2`  Short term goal 4: Pt able to ambulate with rolling walker distance of 20 ft x 2, min A x 2.   Short term goal 5: Improve R LE strengthto 2 to 2+/5 to improve fucntion  Patient Goals   Patient goals : Can I come to Rehab       Therapy Time   Individual Concurrent Group Co-treatment   Time In 0858         Time Out 0931         Minutes 33         Timed Code Treatment Minutes: 51 [facility]       Anita Harris, PT

## 2021-11-11 NOTE — PLAN OF CARE
Problem: Skin Integrity:  Goal: Will show no infection signs and symptoms  Description: Will show no infection signs and symptoms  Outcome: Ongoing     Problem: Falls - Risk of:  Goal: Will remain free from falls  Description: Will remain free from falls  Outcome: Ongoing     Problem: Falls - Risk of:  Goal: Absence of physical injury  Description: Absence of physical injury  Outcome: Ongoing     Problem: HEMODYNAMIC STATUS  Goal: Patient has stable vital signs and fluid balance  Outcome: Ongoing     Problem: ACTIVITY INTOLERANCE/IMPAIRED MOBILITY  Goal: Mobility/activity is maintained at optimum level for patient  Outcome: Ongoing     Problem: COMMUNICATION IMPAIRMENT  Goal: Ability to express needs and understand communication  Outcome: Ongoing

## 2021-11-11 NOTE — CONSULTS
Department of Internal Medicine  Nephrology Johana Walker MD   Consult Note    Reason for consultation: Management of end-stage renal disease. Consulting physician: Jeane Manning MD.    History of present illness: This is a 59 y.o. female with a significant past medical history of Chronic atrial fibrillation [on Eliquis], Type 2 diabetes mellitus, essential systemic hypertension, coronary artery disease [s/p CABG in 2004 and PTCA in 2019], heart failure with preserved ejection fraction [LVEF 55%] and end-stage renal disease secondary to diabetic and hypertensive nephropathy [on routine hemodialysis on a Monday/Wednesday/Friday schedule at [address] [place] [address] dialysis unit on [facility] under the care of Dr. Maldonado Leonard since August 2001 using IJ tunneled dialysis catheter], who presented to the hospital for further evaluation of acute on chronic right-sided weakness, Slurred speech and confusion. She was initially diagnosed with acute/subacute stroke in the left frontal lobe with right-sided weakness 4 months ago. CT scan of the brain performed at presentation showed no acute finding but CT angiogram of the head and neck showed 70% stenosis in the cavernous/supraclinoid segment of the right internal carotid artery. There was also minimal punctate calcification along the posterior aspect of the right globe.     Adhesive tape, Ace inhibitors, Iv dye [iodides], and Metformin and related    Past Medical History:   Diagnosis Date    Backache, unspecified     CHF (congestive heart failure) (HCC)     Chronic kidney disease     Coronary atherosclerosis of artery bypass graft     Cramp of limb     Gallstones     Hypertension     Insomnia     Pneumonia     Type II or unspecified type diabetes mellitus with renal manifestations, not stated as uncontrolled(250.40)     Type II or unspecified type diabetes mellitus without mention of complication, not stated as uncontrolled     Unspecified vitamin D deficiency Scheduled Meds:   aspirin  81 mg Oral Daily    atorvastatin  80 mg Oral Daily    busPIRone  7.5 mg Oral TID    carvedilol  6.25 mg Oral BID    apixaban  5 mg Oral BID    febuxostat  40 mg Oral Daily    ferrous sulfate  325 mg Oral BID WC    furosemide  80 mg Oral BID    gabapentin  300 mg Oral BID    insulin glargine  53 Units SubCUTAneous BID    insulin lispro  0-18 Units SubCUTAneous TID WC    insulin lispro  0-9 Units SubCUTAneous Nightly    pantoprazole  40 mg Oral QAM AC    ranolazine  1,000 mg Oral BID    tamsulosin  0.4 mg Oral Daily    traZODone  75 mg Oral Nightly    sodium chloride flush  5-40 mL IntraVENous 2 times per day     Continuous Infusions:   sodium chloride      dextrose       PRN Meds:.sodium chloride flush, sodium chloride flush, sodium chloride, acetaminophen, ondansetron **OR** ondansetron, glucose, dextrose, glucagon (rDNA), dextrose    Family History   Problem Relation Age of Onset    Diabetes Father     Heart Failure Father         Social History     Socioeconomic History    Marital status: Single     Spouse name: None    Number of children: None    Years of education: None    Highest education level: None   Occupational History    None   Tobacco Use    Smoking status: Never Smoker    Smokeless tobacco: Never Used   Vaping Use    Vaping Use: Never used   Substance and Sexual Activity    Alcohol use: No    Drug use: No    Sexual activity: Not Currently   Other Topics Concern    None   Social History Narrative    None     Social Determinants of Health     Financial Resource Strain: Low Risk     Difficulty of Paying Living Expenses: Not hard at all   Food Insecurity:     Worried About Running Out of Food in the Last Year: Not on file    Nany of Food in the Last Year: Not on file   Transportation Needs:     Lack of Transportation (Medical): Not on file    Lack of Transportation (Non-Medical):  Not on file   Physical Activity:     Days of Exercise per Week: Not on file    Minutes of Exercise per Session: Not on file   Stress:     Feeling of Stress : Not on file   Social Connections:     Frequency of Communication with Friends and Family: Not on file    Frequency of Social Gatherings with Friends and Family: Not on file    Attends Hindu Services: Not on file    Active Member of [address] [place] [address] or Organizations: Not on file    Attends Club or Organization Meetings: Not on file    Marital Status: Not on file   Intimate Partner Violence:     Fear of Current or Ex-Partner: Not on file    Emotionally Abused: Not on file    Physically Abused: Not on file    Sexually Abused: Not on file   Housing Stability:     Unable to Pay for Housing in the Last Year: Not on file    Number of [place] in the Last Year: Not on file    Unstable Housing in the Last Year: Not on file     Review of systems: CNS - no headache or dizziness; Cardiac - no chest pain; Respiratory - no shortness of breath; Gastrointestinal - No nausea, vomiting or diarrhea; Musculoskeletal - general body aches; Skin/Integument - no rashes. Physical Exam:    VITALS:  BP (!) 149/72   Pulse 58   Temp 97.5 °F (36.4 °C) (Oral)   Resp 14   Ht 5' 4\" (1.626 m)   Wt 240 lb (108.9 kg)   SpO2 95%   BMI 41.20 kg/m²   24HR INTAKE/OUTPUT:    Intake/Output Summary (Last 24 hours) at 11/11/2021 1125  Last data filed at 11/11/2021 9492  Gross per 24 hour   Intake 710 ml   Output 700 ml   Net 10 ml     Constitutional: alert, appears stated age and cooperative    Skin: Skin color, texture, turgor normal. No rashes or lesions    Head: Normocephalic, without obvious abnormality, atraumatic     Cardiovascular/Edema: irregularly irregular rhythm    Respiratory: clear to auscultation bilaterally    Abdomen: soft, non-tender; bowel sounds normal; no masses,  no organomegaly    Back: symmetric, no curvature. ROM normal. No CVA tenderness.     Extremities: edema +    Neuro:  Awake but with slurred speech; muscle power 1/5 in right upper and lower extremity. CBC:   Recent Labs     11/10/21  1550   WBC 5.1   HGB 13.0        BMP:    Recent Labs     11/10/21  1550 11/10/21  1559     --    K 4.0  --      --    CO2 22  --    BUN 16  --    CREATININE 1.42* 1.18   GLUCOSE 347*  --      Lab Results   Component Value Date    NITRU NEGATIVE 09/14/2021    COLORU YELLOW 09/14/2021    PHUR 7.5 09/14/2021    WBCUA 0 TO 2 09/14/2021    RBCUA None 09/14/2021    MUCUS NOT REPORTED 09/14/2021    TRICHOMONAS NOT REPORTED 09/14/2021    YEAST NOT REPORTED 09/14/2021    BACTERIA NOT REPORTED 09/14/2021    SPECGRAV 1.006 09/14/2021    LEUKOCYTESUR TRACE 09/14/2021    UROBILINOGEN Normal 09/14/2021    BILIRUBINUR NEGATIVE 09/14/2021    GLUCOSEU NEGATIVE 09/14/2021    KETUA NEGATIVE 09/14/2021    AMORPHOUS NOT REPORTED 09/14/2021     Urine Creatinine:     Lab Results   Component Value Date    LABCREA 87.7 07/16/2021     IMPRESSION/RECOMMENDATIONS:    1. Acute kidney injury [secondary to acute tubular necrosis and dialysis dependent since August 2021] - will maintain [facility] hemodialysis schedule. Next hemodialysis is due tomorrow. Patient was started on dialysis in August for acute kidney injury and renal function appears to be improving at this time. We will collect 24-hour urine for creatinine clearance and protein to determine need for continuation of dialysis. Renal diet,i.e 2-gram sodium,2-gram potassium,1500 ml fluid restriction,1-gram phosphorus, 1800 KCal and 1.2 gram protein per day. 2.  Acute on chronic right-sided weakness - rule out recurrent CVA. She may benefit from carotid endarterectomy if possible. Will await neurology evaluation. 3.  Systemic hypertension - blood pressure control is fair. Continue current medications. 4.  Mineral bone disease profile check serum phosphorus level. Prognosis is guarded. Thank you very much for the courtesy of this consultation.     Asher Gallo MD FACP  Attending Nephrologist  11/11/2021 11:12 AM

## 2021-11-11 NOTE — PROGRESS NOTES
[address]   Occupational Therapy Evaluation  Date: 21  Patient Name: Taylor Cr       Room: 5170/7326-36  MRN: [id number]  Account: [de-identified]   : 1960  (59 y.o.) Gender: female     Discharge Recommendations: The patient would benefit from an intensive level of therapy after discharge from the facility. They should be able to tolerate 3-hours of Combined OT/PT/ST over 5 days/week or at least 900 minutes of  Combined Therapy over 7 days. Equipment Needed:  (TBD)    Referring Practitioner: Kirsten Eli MD  Diagnosis: Stroke-like symptoms       Treatment Diagnosis: Impaird self care status  Past Medical History:  has a past medical history of Backache, unspecified, CHF (congestive heart failure) ([address] Chronic kidney disease, Coronary atherosclerosis of artery bypass graft, Cramp of limb, Gallstones, Hypertension, Insomnia, Pneumonia, Type II or unspecified type diabetes mellitus with renal manifestations, not stated as uncontrolled(250.40), Type II or unspecified type diabetes mellitus without mention of complication, not stated as uncontrolled, and Unspecified vitamin D deficiency. Past Surgical History:   has a past surgical history that includes Coronary artery bypass graft; Knee arthroscopy; Carpal tunnel release; Breast surgery; Tonsillectomy; Hand surgery; Ankle fracture surgery; Cholecystectomy, open (N/A); and IR TUNNELED CVC PLACE WO SQ PORT/PUMP > 5 YEARS (2021). Restrictions  Restrictions/Precautions: Contact Precautions, Fall Risk  Other position/activity restrictions: Hemodialysis MWF  Required Braces or Orthoses  Right Lower Extremity Brace: ((Lymphedema wraps))  Left Lower Extremity Brace:   ((Lymphedema wraps))  Required Braces or Orthoses?: Yes     Vitals  Temp: 97.7 °F (36.5 °C)  Pulse: 67  Resp: 14  BP: (!) 145/59  Height: 5' 4\" (162.6 cm)  Weight: 240 lb (108.9 kg)  BMI (Calculated): 41.3  Oxygen Therapy  SpO2: 93 %  Pulse Oximeter Device Mode: Intermittent  Pulse Oximeter Device Location: Left, Finger  O2 Device: None (Room air)  O2 Flow Rate (L/min): 2 L/min  Level of Consciousness: Alert (0)    Subjective  Subjective: Pt resting in bed upon arrival. Pt pleasant and agreeable to OT/PT eval. Pt tearful throughout session due to memory deficts and limited sensation in RLE/RUE. Comments: OK per RN for OT/PT eval  Overall Orientation Status: Impaired  Vision  Vision: Impaired  Vision Exceptions: Wears glasses for reading  Hearing  Hearing: Within functional limits  Social/Functional History  Lives With: Family (Mother)  Type of Home:  (Condo)  Home Layout: One level  Home Access: Ramped entrance  Bathroom Shower/Tub: Walk-in shower, Doors (Threshold to step over)  Bathroom Toilet: Handicap height  Bathroom Equipment: Grab bars in shower, Built-in shower seat (Uses wall of shower to get off toilet)  Bathroom Accessibility: Walker accessible  Home Equipment: 4 wheeled walker, Cane, Sock aid (HAs a recliner at home)  ADL Assistance: Independent  Homemaking Assistance: Needs assistance (sisters assist )  Ambulation Assistance: Independent (3PB )  Transfer Assistance: Independent  Active : No  Patient's  Info: family-sister  Mode of Transportation: Car  IADL Comments: Pt sleeps in flat bed at home. Additional Comments: Pt recently discharged from Rehab at [facility]  on 8/26/21 and again on 9-26-21. Pt was going to dialysis 3 times per week. Pt is demonstrating some confusion/memory deficits and above information taken from previous therapy eval 8-12-21. Pt able to identify that her mother is still living with her and her sisters assist with caring for her mother. Objective          Sensation  Overall Sensation Status: Impaired  Additional Comments: Numbness in R foot. Hypersensivity in R lateral portion of calf. Pt reports decreased sensation un RUE.     ADL  Feeding: Minimal assistance  Grooming: Minimal assistance  UE Bathing: Moderate assistance  LE Bathing: Maximum assistance  UE Dressing: Moderate assistance  LE Dressing: Dependent/Total  Toileting: Dependent/Total  Additional Comments: ADL scores based on clinical reasoning and skilled observation unless otherwise noted. Pt currently limited due to decreased ROM/strength RUE, balance, activity tolerance, and cognitive barriers impacting safety and independence with self care tasks    UE Function           LUE Strength  Gross LUE Strength: WFL  L Hand General: 4/5     LUE Tone: Normotonic     LUE AROM (degrees)  LUE AROM : WFL     Left Hand AROM (degrees)  Left Hand AROM: WFL  RUE Strength  Gross RUE Strength: Exceptions to [facility]  R Hand General: 2-/5      RUE Tone: Normotonic     RUE AROM (degrees)  RUE AROM : Exceptions  RUE General AROM: AAROM shoulder ~90 degrees due to pain. Elbow WFL with increased time and tactile cueing     Right Hand AROM (degrees)  Right Hand AROM: WFL  Right Hand General AROM: with increased time nad verbal cues to complete. Fine Motor Skills  Coordination  Movements Are Fluid And Coordinated: No                           Mobility  Supine to Sit: 2 Person assistance, Minimal assistance  Sit to Supine: 2 Person assistance, Moderate assistance       Balance  Sitting Balance: Contact guard assistance  Standing Balance: Moderate assistance (x2)  Standing Balance  Time: 1-2 minutes x 2  Activity: standing EOB with small side step towards HOB  Comment: with RW. Pt reporting decreased sensation in R side of body while standing. Functional Mobility  Functional - Mobility Device: Rolling Walker  Activity: Other (small side steps to [facility])  Assist Level: Moderate assistance (x2)  Functional Mobility Comments: Verbal cues for hand placement and safety. Decreased motor planning noted. pt unable to  feet at this time. Bed mobility  Supine to Sit: 2 Person assistance; Minimal assistance  Sit to Supine: 2 Person assistance;  Moderate assistance  Scooting: Moderate assistance (seated scooting to EOB)  Comment: Verbal cues for sequencing and increased time to complete. Transfers  Sit to stand: 2 Person assistance, Moderate assistance  Stand to sit: 2 Person assistance, Moderate assistance  Transfer Comments: Verbal cues for hand placement and safety. Functional Activity Tolerance  Functional Activity Tolerance: Tolerates 30 min exercise with multiple rests     Assessment  Assessment  Performance deficits / Impairments: Decreased ADL status, Decreased functional mobility , Decreased ROM, Decreased strength, Decreased safe awareness, Decreased cognition, Decreased endurance, Decreased sensation, Decreased balance, Decreased high-level IADLs, Decreased fine motor control, Decreased coordination  Treatment Diagnosis: Impaird self care status  Prognosis: Good  Decision Making: Medium Complexity  REQUIRES OT FOLLOW UP: Yes  Discharge Recommendations: Patient would benefit from continued therapy after discharge  Activity Tolerance: Patient limited by fatigue, Treatment limited secondary to decreased cognition         Functional Outcome Measures  AM-PAC Daily Activity Inpatient   How much help for putting on and taking off regular lower body clothing?: Total  How much help for Bathing?: A Lot  How much help for Toileting?: Total  How much help for putting on and taking off regular upper body clothing?: A Lot  How much help for taking care of personal grooming?: A Little  How much help for eating meals?: A Little  AM-[facility] Inpatient Daily Activity Raw Score: 12  AM-PAC Inpatient ADL T-Scale Score : 30.6  ADL Inpatient CMS 0-100% Score: 66.57  ADL Inpatient CMS G-Code Modifier : CL       Goals  Patient Goals   Patient goals :  To get better  Short term goals  Time Frame for Short term goals: By discharge  Short term goal 1: Pt will complete lower body dressing/bathing with mod A and good safety with use fo AE as needed  Short term goal 2:  Pt will complete upper body dressing/bathing with min A and good attention to task  Short term goal 3:  Pt will complete functional transfers/mobility during self care tasks with min A and good safety with use of least restrictive device  Short term goal 4: Pt will tolerate standing 3+ minutes during functional activity of choice with min A and good safety 5  Short term goal 5: Pt will participate in 15+ minutes of therapeutic exercises/functional activities to increase safety and independence with self care and mobility    Plan  Safety Devices  Safety Devices in place: Yes  Type of devices:  All fall risk precautions in place, Call light within reach, Gait belt, Patient at risk for falls, Left in bed, Nurse notified     Plan  Times per week: 5-7  Current Treatment Recommendations: Self-Care / ADL, Strengthening, ROM, Balance Training, Functional Mobility Training, Endurance Training, Cognitive Reorientation, Safety Education & Training, Patient/Caregiver Education & Training, Equipment Evaluation, Education, & procurement, Home Management Training, Cognitive/Perceptual Training       Equipment Recommendations  Equipment Needed:  (TBD)  OT Individual Minutes  Time In: 3928  Time Out: 6772  Minutes: 33    Electronically signed by Letty Grossman OT on 11/11/21 at 4:58 PM EST

## 2021-11-12 LAB
ANION GAP SERPL CALCULATED.3IONS-SCNC: 13 MMOL/L (ref 9–17)
BUN BLDV-MCNC: 27 MG/DL (ref 8–23)
BUN/CREAT BLD: ABNORMAL (ref 9–20)
CALCIUM SERPL-MCNC: 9.2 MG/DL (ref 8.6–10.4)
CHLORIDE BLD-SCNC: 100 MMOL/L (ref 98–107)
CO2: 23 MMOL/L (ref 20–31)
CREAT SERPL-MCNC: 2.35 MG/DL (ref 0.5–0.9)
GFR AFRICAN AMERICAN: 25 ML/MIN
GFR NON-AFRICAN AMERICAN: 21 ML/MIN
GFR SERPL CREATININE-BSD FRML MDRD: ABNORMAL ML/MIN/{1.73_M2}
GFR SERPL CREATININE-BSD FRML MDRD: ABNORMAL ML/MIN/{1.73_M2}
GLUCOSE BLD-MCNC: 282 MG/DL (ref 65–105)
GLUCOSE BLD-MCNC: 305 MG/DL (ref 65–105)
GLUCOSE BLD-MCNC: 338 MG/DL (ref 65–105)
GLUCOSE BLD-MCNC: 343 MG/DL (ref 65–105)
GLUCOSE BLD-MCNC: 364 MG/DL (ref 65–105)
GLUCOSE BLD-MCNC: 413 MG/DL (ref 70–99)
POTASSIUM SERPL-SCNC: 4.4 MMOL/L (ref 3.7–5.3)
SODIUM BLD-SCNC: 136 MMOL/L (ref 135–144)

## 2021-11-12 PROCEDURE — 6370000000 HC RX 637 (ALT 250 FOR IP): Performed by: FAMILY MEDICINE

## 2021-11-12 PROCEDURE — 2060000000 HC ICU INTERMEDIATE R&B

## 2021-11-12 PROCEDURE — 82947 ASSAY GLUCOSE BLOOD QUANT: CPT

## 2021-11-12 PROCEDURE — 97129 THER IVNTJ 1ST 15 MIN: CPT

## 2021-11-12 PROCEDURE — 99254 IP/OBS CNSLTJ NEW/EST MOD 60: CPT | Performed by: PHYSICAL MEDICINE & REHABILITATION

## 2021-11-12 PROCEDURE — 97130 THER IVNTJ EA ADDL 15 MIN: CPT

## 2021-11-12 PROCEDURE — 97116 GAIT TRAINING THERAPY: CPT

## 2021-11-12 PROCEDURE — 82570 ASSAY OF URINE CREATININE: CPT

## 2021-11-12 PROCEDURE — 90935 HEMODIALYSIS ONE EVALUATION: CPT

## 2021-11-12 PROCEDURE — 95819 EEG AWAKE AND ASLEEP: CPT | Performed by: PSYCHIATRY & NEUROLOGY

## 2021-11-12 PROCEDURE — 99232 SBSQ HOSP IP/OBS MODERATE 35: CPT | Performed by: FAMILY MEDICINE

## 2021-11-12 PROCEDURE — 2500000003 HC RX 250 WO HCPCS: Performed by: INTERNAL MEDICINE

## 2021-11-12 PROCEDURE — 97530 THERAPEUTIC ACTIVITIES: CPT

## 2021-11-12 PROCEDURE — 2580000003 HC RX 258: Performed by: FAMILY MEDICINE

## 2021-11-12 PROCEDURE — 97535 SELF CARE MNGMENT TRAINING: CPT

## 2021-11-12 PROCEDURE — 84156 ASSAY OF PROTEIN URINE: CPT

## 2021-11-12 PROCEDURE — 36415 COLL VENOUS BLD VENIPUNCTURE: CPT

## 2021-11-12 PROCEDURE — 81050 URINALYSIS VOLUME MEASURE: CPT

## 2021-11-12 PROCEDURE — 80048 BASIC METABOLIC PNL TOTAL CA: CPT

## 2021-11-12 PROCEDURE — 99233 SBSQ HOSP IP/OBS HIGH 50: CPT | Performed by: PSYCHIATRY & NEUROLOGY

## 2021-11-12 RX ORDER — SODIUM CITRATE 4 % (5 ML)
1.9 SYRINGE (ML) MISCELLANEOUS PRN
Status: DISCONTINUED | OUTPATIENT
Start: 2021-11-12 | End: 2021-11-15 | Stop reason: HOSPADM

## 2021-11-12 RX ORDER — DOCUSATE SODIUM 100 MG/1
100 CAPSULE, LIQUID FILLED ORAL DAILY
Status: DISCONTINUED | OUTPATIENT
Start: 2021-11-12 | End: 2021-11-12

## 2021-11-12 RX ORDER — DOCUSATE SODIUM 100 MG/1
100 CAPSULE, LIQUID FILLED ORAL 2 TIMES DAILY
Status: DISCONTINUED | OUTPATIENT
Start: 2021-11-12 | End: 2021-11-15 | Stop reason: HOSPADM

## 2021-11-12 RX ADMIN — TAMSULOSIN HYDROCHLORIDE 0.4 MG: 0.4 CAPSULE ORAL at 10:08

## 2021-11-12 RX ADMIN — BUSPIRONE HYDROCHLORIDE 7.5 MG: 5 TABLET ORAL at 10:08

## 2021-11-12 RX ADMIN — DOCUSATE SODIUM 100 MG: 100 CAPSULE, LIQUID FILLED ORAL at 23:43

## 2021-11-12 RX ADMIN — SODIUM CHLORIDE, PRESERVATIVE FREE 10 ML: 5 INJECTION INTRAVENOUS at 21:56

## 2021-11-12 RX ADMIN — BUSPIRONE HYDROCHLORIDE 7.5 MG: 5 TABLET ORAL at 21:25

## 2021-11-12 RX ADMIN — DOCUSATE SODIUM 100 MG: 100 CAPSULE ORAL at 10:08

## 2021-11-12 RX ADMIN — FEBUXOSTAT 40 MG: 40 TABLET, FILM COATED ORAL at 10:12

## 2021-11-12 RX ADMIN — PANTOPRAZOLE SODIUM 40 MG: 40 TABLET, DELAYED RELEASE ORAL at 06:26

## 2021-11-12 RX ADMIN — INSULIN GLARGINE 53 UNITS: 100 INJECTION, SOLUTION SUBCUTANEOUS at 10:06

## 2021-11-12 RX ADMIN — INSULIN LISPRO 15 UNITS: 100 INJECTION, SOLUTION INTRAVENOUS; SUBCUTANEOUS at 18:18

## 2021-11-12 RX ADMIN — GABAPENTIN 300 MG: 300 CAPSULE ORAL at 10:08

## 2021-11-12 RX ADMIN — CARVEDILOL 6.25 MG: 6.25 TABLET, FILM COATED ORAL at 21:26

## 2021-11-12 RX ADMIN — SODIUM CHLORIDE, PRESERVATIVE FREE 10 ML: 5 INJECTION INTRAVENOUS at 10:11

## 2021-11-12 RX ADMIN — APIXABAN 5 MG: 5 TABLET, FILM COATED ORAL at 10:08

## 2021-11-12 RX ADMIN — GABAPENTIN 300 MG: 300 CAPSULE ORAL at 21:26

## 2021-11-12 RX ADMIN — ACETAMINOPHEN 650 MG: 325 TABLET ORAL at 22:41

## 2021-11-12 RX ADMIN — INSULIN LISPRO 9 UNITS: 100 INJECTION, SOLUTION INTRAVENOUS; SUBCUTANEOUS at 10:06

## 2021-11-12 RX ADMIN — Medication 1.9 ML: at 13:24

## 2021-11-12 RX ADMIN — ATORVASTATIN CALCIUM 80 MG: 80 TABLET, FILM COATED ORAL at 10:09

## 2021-11-12 RX ADMIN — FUROSEMIDE 80 MG: 40 TABLET ORAL at 14:54

## 2021-11-12 RX ADMIN — INSULIN LISPRO 6 UNITS: 100 INJECTION, SOLUTION INTRAVENOUS; SUBCUTANEOUS at 21:27

## 2021-11-12 RX ADMIN — RANOLAZINE 1000 MG: 500 TABLET, FILM COATED, EXTENDED RELEASE ORAL at 21:26

## 2021-11-12 RX ADMIN — INSULIN LISPRO 12 UNITS: 100 INJECTION, SOLUTION INTRAVENOUS; SUBCUTANEOUS at 14:55

## 2021-11-12 RX ADMIN — APIXABAN 5 MG: 5 TABLET, FILM COATED ORAL at 21:25

## 2021-11-12 RX ADMIN — INSULIN GLARGINE 53 UNITS: 100 INJECTION, SOLUTION SUBCUTANEOUS at 21:27

## 2021-11-12 RX ADMIN — BUSPIRONE HYDROCHLORIDE 7.5 MG: 5 TABLET ORAL at 14:54

## 2021-11-12 RX ADMIN — ASPIRIN 81 MG: 81 TABLET, CHEWABLE ORAL at 10:08

## 2021-11-12 RX ADMIN — TRAZODONE HYDROCHLORIDE 75 MG: 50 TABLET ORAL at 21:25

## 2021-11-12 RX ADMIN — RANOLAZINE 1000 MG: 500 TABLET, FILM COATED, EXTENDED RELEASE ORAL at 10:07

## 2021-11-12 ASSESSMENT — PAIN SCALES - GENERAL
PAINLEVEL_OUTOF10: 0
PAINLEVEL_OUTOF10: 4
PAINLEVEL_OUTOF10: 0

## 2021-11-12 NOTE — PROGRESS NOTES
Notified AFSHAN Francois, that per Dr Bradford Sessions pt is approp for ARU. Requested if precert can be initiated. Rcv'd call from Edison Tirado who states precert can be initiated. Precert for ARU  initiated with [facility] via [address] [place] [address] with [facility] [employer] [id number].

## 2021-11-12 NOTE — CONSULTS
[facility] [place] ([facility]) Neurology Specialist  [address]  [place], [address] [facility]:  [phone] or [phone]  FAX:  [phone]            Brief history: Taylor Cr is a 59 y.o. old female admitted on 11/10/2021 with amnesia     Subjective: No new neurological events overnight. Patient denies any new weakness, numbness, tingling or headache. The patient believes that he is somewhat improved.   [place] trouble with finding words     Objective: BP (!) 144/69   Pulse 56   Temp 97.5 °F (36.4 °C)   Resp 18   Ht 5' 4\" (1.626 m)   Wt 235 lb 7.2 oz (106.8 kg)   SpO2 90%   BMI 40.42 kg/m²       Medications:    docusate sodium  100 mg Oral Daily    aspirin  81 mg Oral Daily    atorvastatin  80 mg Oral Daily    busPIRone  7.5 mg Oral TID    carvedilol  6.25 mg Oral BID    apixaban  5 mg Oral BID    febuxostat  40 mg Oral Daily    ferrous sulfate  325 mg Oral BID WC    furosemide  80 mg Oral BID    gabapentin  300 mg Oral BID    insulin glargine  53 Units SubCUTAneous BID    insulin lispro  0-18 Units SubCUTAneous TID WC    insulin lispro  0-9 Units SubCUTAneous Nightly    pantoprazole  40 mg Oral QAM AC    ranolazine  1,000 mg Oral BID    tamsulosin  0.4 mg Oral Daily    traZODone  75 mg Oral Nightly    sodium chloride flush  5-40 mL IntraVENous 2 times per day        Neurological examination:    Mental status   Alert and oriented; intact memory with no confusion, speech or language problems; no hallucinations or delusions     Cranial nerves   II - visual fields intact to confrontation                                                III, IV, VI - extra-ocular muscles full: no pupillary defect; no CECELIA, no nystagmus, no ptosis                                                                      V - normal facial sensation                                                               VII - normal facial symmetry                                                             VIII - intact hearing                                                                             IX, X - symmetrical palate                                                                  XI - symmetrical shoulder shrug                                                       XII - midline tongue without atrophy or fasciculation     Motor function  Normal muscle bulk and tone; normal power 5/5 in all extremities except right lower extremity, pain giveaway weakness     Sensory function  decrease sensation in the right lower extremity   Cerebellar Intact fine motor movement. No involuntary movements or tremors     Reflex function Intact 2+ DTR and symmetric. Negative Babinski     Gait                  Not tested         Lab Results   Component Value Date    LDLCALC 28 04/09/2015    LDLCHOLESTEROL 72 12/26/2012     No components found for: CHLPL  Lab Results   Component Value Date    TRIG 168 04/09/2015    TRIG 266 02/12/2014    TRIG 218 (H) 12/26/2012     Lab Results   Component Value Date    HDL 43 04/09/2015    HDL 42 02/12/2014    HDL 43 12/26/2012     Lab Results   Component Value Date    LDLCALC 28 04/09/2015    LDLCALC 60 02/12/2014     No results found for: LABVLDL  Lab Results   Component Value Date    LABA1C 8.9 (H) 03/15/2021     Lab Results   Component Value Date     03/15/2021     Lab Results   Component Value Date    XYQIJRYD29 998 12/26/2012      Neurological work up:  CT head unremarkable   CTA head and neck 70% supraclinoid right ICA stenosis  MRI brain 9/13/2021 normal  2 D echo     Assessment and Recommendations:   Patient with a history of possible stroke in August 2021 and recurrent episodes of amnesia   70% supraclinoid right ICA stenosis, likely clinically asymptomatic    The patient is minimally improved from yesterday.   Neurological examination is nonfocal.    EEG brain is normal.  MRI scan of the brain is negative for acute changes except for a chronic punctate left occipital encephalomalacia, possible old stroke. I reviewed patient MRI scan of the brain report from [facility].  There was a questionable punctate hyperintensity on DWI scan, possible stroke versus artifact. Nonetheless, that MRI finding does not explain extent the patient symptoms. Overall, and last 3 months, the patient has 3 MRI scans of the brain, all of which are essentially negative, two EEGs which have been normal.  I suspect, most, if not all of the symptoms could be secondary to underlying psychological stress. I explained the working diagnosis to the patient and her sister. They are accepting of the diagnosis. The patient is also agreeable to see psychiatrist inpatient for further management    My suspicion for a primary neurological etiology is low. Nonetheless, if symptoms do not improve, on outpatient basis, a long-term EEG monitoring and a spinal tap may be obtained to rule out autoimmune encephalitis, although less likely    We will follow. Jonh Chao MD  Neurology    This note is created with the assistance of a speech-recognition program. While intending to generate a document that actually reflects the content of the visit, the document can still have some errors including those of syntax and sound a- like substitutions which may escape proofreading. In such instances, actual meaning can be extrapolated by contextual derivation.

## 2021-11-12 NOTE — PROGRESS NOTES
HEMODIALYSIS PRE-TREATMENT NOTE    Patient Identifiers prior to treatment: Name, , MRN    Isolation Required: Yes                      Isolation Type: MRSA, Contact       (please document if patient is being managed as a PUI/COVID-19 patient)        Hepatitis status:                           Date Drawn                             Result  Hepatitis B Surface Antigen 21     Negative                     Hepatitis B Surface Antibody 21 48.51        Hepatitis B Core Antibody 21 Negative          How was Hepatitis Status verified: Epic     Was a copy of the labs you documented provided to facility for the patient's chart:     Hemodialysis orders verified: Yes    Access Within normal limits ( I.e. s/s of infection,...): Tunneled CVC to right chest wall WNL. Aspirates and flushes well. Dressing changed. Patient tolerated well.       Pre-Assessment completed: Yes    Pre-dialysis report received from: Ruth Ann Hutchison RN                      Time: 7091

## 2021-11-12 NOTE — PROGRESS NOTES
[address]   Physical Therapy Progress Note    Date: 21  Patient Name: Hayden Trevizo       Room:   MRN: [id number]   Account: [de-identified]   : 1960  (59 y.o.)   Gender: female     Discharge Recommendations   The patient would benefit from an intensive level of therapy after discharge from the facility. They should be able to tolerate 3-hours of Combined OT/PT/ST over 5 days/week or at least 900 minutes of  Combined Therapy over 7 days. Referring Practitioner: Jason Arango MD  Diagnosis: Stroke-like symptoms  Restrictions/Precautions: Contact Precautions, Fall Risk  Other position/activity restrictions: Hemodialysis MWF  Required Braces or Orthoses  Right Lower Extremity Brace: ((Lymphedema wraps))  Left Lower Extremity Brace: ((Lymphedema wraps))   Past Medical History:  has a past medical history of Backache, unspecified, CHF (congestive heart failure) ([address] Chronic kidney disease, Coronary atherosclerosis of artery bypass graft, Cramp of limb, Gallstones, Hypertension, Insomnia, Pneumonia, Type II or unspecified type diabetes mellitus with renal manifestations, not stated as uncontrolled(250.40), Type II or unspecified type diabetes mellitus without mention of complication, not stated as uncontrolled, and Unspecified vitamin D deficiency. Past Surgical History:   has a past surgical history that includes Coronary artery bypass graft; Knee arthroscopy; Carpal tunnel release; Breast surgery; Tonsillectomy; Hand surgery; Ankle fracture surgery; Cholecystectomy, open (N/A); and IR TUNNELED CVC PLACE WO SQ PORT/PUMP > 5 YEARS (2021). Additional Pertinent Hx:  This is a 59 y.o. female with a significant past medical history of Chronic atrial fibrillation on Eliquis, Type 2 diabetes mellitus, essential systemic hypertension, coronary artery disease s/p CABG in 2004 and PTCA in 2019, heart failure with preserved ejection fraction LVEF 55% and end-stage renal disease secondary to diabetic and hypertensive nephropathy on routine hemodialysis on a Monday/Wednesday/Friday schedule at [address] [facility] [address] dialysis unit on [facility] under the care of Dr. Fabi Rowe since August 2001 using IJ tunneled dialysis catheter, who presented to the hospital for further evaluation of acute on chronic right-sided weakness, Slurred speech and confusion. She was initially diagnosed with acute/subacute stroke in the left frontal lobe with right-sided weakness 4 months ago. Neurology consulted , MRI brain pending. Overall Orientation Status: Impaired  Orientation Level: Oriented to situation, Disoriented to time, Oriented to place, Oriented to person (remembers name but not birthdate)  Restrictions/Precautions  Restrictions/Precautions: Contact Precautions; Fall Risk  Required Braces or Orthoses?: Yes  Required Braces or Orthoses  Right Lower Extremity Brace: ((Lymphedema wraps))  Left Lower Extremity Brace: ((Lymphedema wraps))  Position Activity Restriction  Other position/activity restrictions: Hemodialysis MWF    Subjective: Pt reports right Leg is hypersensitive  Comments: DEDE guzmán with PT/OT tx. Co-treat with LINDA Swann. Vital Signs  Patient Currently in Pain: Denies                     Bed Mobility  Rolling: Minimal assistance  Supine to Sit: Minimal assistance  Sit to Supine: Unable to assess (pt left in bedside recliner)  Scooting: Stand by assistance (to Edge of Bed)  Comment: Cues for sequencing      Transfers:  Sit to Stand: Minimal Assistance  Stand to sit: Minimal Assistance  Bed to Chair: Minimal assistance              Ambulation 1  Surface: level tile  Device: Rolling Walker  Assistance: Minimal assistance  Quality of Gait: Slightly unsteady, right LE clearing floor however no heel/toe gait, minimal knee flexion, wide ELISABET, very slow pace  Gait Deviations: Decreased step length; Decreased step height; Slow Annie; Increased ELISABET;  Deviated path  Distance: 15ft  Comments: cues for upright posture and to stay inside RW        Stairs/Curb  Stairs?: No                                                     Posture: Fair  Sitting - Static: Good; -  Sitting - Dynamic: Fair; +  Standing - Static: Fair; +  Standing - Dynamic: Fair; -  Comments: Standing with rolling walker     Other exercises?: Yes  Other exercises 1: Seated EOB dynamic activities  25 min, reaching Out of Base of Support  Other exercises 2: Transfer from Bed to bedside chair  Other exercises 3: Education on Safe use of RW           Activity Tolerance: Patient limited by endurance; Patient Tolerated treatment well          Assessment  Activity Tolerance: Patient limited by endurance; Patient Tolerated treatment well   Body structures, Functions, Activity limitations: Decreased functional mobility ; Decreased strength; Decreased cognition; Decreased endurance; Decreased sensation; Decreased balance; Decreased coordination; Decreased posture;  Increased pain; Decreased ROM  Prognosis: Fair  Discharge Recommendations: Patient would benefit from continued therapy after discharge     Type of devices: Call light within reach; Gait belt; Left in bed     Plan  Times per week: 5 to 7 x/week  Current Treatment Recommendations: Strengthening, Balance Training, Functional Mobility Training, Transfer Training, Endurance Training, Gait Training, Home Exercise Program, Safety Education & Training, Patient/Caregiver Education & Training    Patient Education  New Education Provided:  Plan of Care  Learner:patient  Method: demonstration and explanation       Outcome: needs reinforcement     Goals  Short term goals  Time Frame for Short term goals: 7 to 8 visits  Short term goal 1: Pt able to perform supine>sit at [facility]  Short term goal 2: Pt able to perform sit to supine min A   Short term goal 3: Pt able to perform sit to stand and pivot transfers with rolling walker, min ax 2`  Short term goal 4: Pt able to ambulate with rolling walker distance of 20 ft x 2, min A x 2.   Short term goal 5: Improve R LE strengthto 2 to 2+/5 to improve fucntion           11/12/21 0848   PT Individual Minutes   Time In 0848   Time Out 0932   Minutes 44       Electronically signed by Landen Leon PTA on 11/12/21 at 3:42 PM EST

## 2021-11-12 NOTE — PLAN OF CARE
Safety maintained, call light is within reach, no s/s or c/o distress, bed is low/locked, side rails are up x2, bed alarm remains on  Problem: Skin Integrity:  Goal: Will show no infection signs and symptoms  Description: Will show no infection signs and symptoms  11/12/2021 0611 by Jazmin Bustillo RN  Outcome: Ongoing  11/11/2021 1734 by Diego Schmitt RN  Outcome: Ongoing  Goal: Absence of new skin breakdown  Description: Absence of new skin breakdown  Outcome: Ongoing     Problem: Falls - Risk of:  Goal: Will remain free from falls  Description: Will remain free from falls  11/12/2021 0611 by Jazmin Bustillo RN  Outcome: Ongoing  11/11/2021 1734 by Diego Schmitt RN  Outcome: Ongoing  Goal: Absence of physical injury  Description: Absence of physical injury  11/12/2021 0611 by Jazmin Bustillo RN  Outcome: Ongoing  11/11/2021 1734 by Diego Schmitt RN  Outcome: Ongoing     Problem: HEMODYNAMIC STATUS  Goal: Patient has stable vital signs and fluid balance  11/12/2021 0611 by Jazmin Bustillo RN  Outcome: Ongoing  11/11/2021 1734 by Diego Schmitt RN  Outcome: Ongoing     Problem: Pain:  Goal: Pain level will decrease  Description: Pain level will decrease  Outcome: Ongoing  Goal: Control of acute pain  Description: Control of acute pain  Outcome: Ongoing  Goal: Control of chronic pain  Description: Control of chronic pain  Outcome: Ongoing

## 2021-11-12 NOTE — PROGRESS NOTES
[address] [facility]    INPATIENT OCCUPATIONAL THERAPY  PROGRESS NOTE  Date: 2021  Patient Name: Altagracia Palafox      Room: -  MRN: [id number]    : 1960  (59 y.o.) Gender: female     Discharge Recommendations: The patient would benefit from an intensive level of therapy after discharge from the facility. They should be able to tolerate 3-hours of Combined OT/PT/ST over 5 days/week or at least 900 minutes of  Combined Therapy over 7 days. Equipment Needed:  (TBD)    Referring Practitioner: Sen Shelton MD  Diagnosis: Stroke-like symptoms  General  Chart Reviewed: Yes  Patient assessed for rehabilitation services?: Yes  Response to previous treatment: Patient with no complaints from previous session  Family / Caregiver Present: Yes (Sister)  Referring Practitioner: Sen Shelton MD  Diagnosis: Stroke-like symptoms    Restrictions  Restrictions/Precautions: Contact Precautions, Fall Risk  Other position/activity restrictions: Hemodialysis MWF  Required Braces or Orthoses  Right Lower Extremity Brace: ((Lymphedema wraps))  Left Lower Extremity Brace: ((Lymphedema wraps))  Required Braces or Orthoses?: Yes      Subjective  Subjective: Pt resting in bed upon arrival. Pt was pleasant and agreeable to OT/PT co-treatment with NATALIYA Castellanos. Comments: Ok per [facility] for OT/PT co-treatment  Patient Currently in Pain: Denies  Overall Orientation Status: Impaired  Orientation Level:  (Unable to state birth year)          Objective     Bed mobility  Rolling to Right: Minimal assistance  Supine to Sit: Minimal assistance  Scooting: Stand by assistance  Comment: Verbal cues for sequencing and min A for management of RLE  Balance  Sitting Balance: Stand by assistance  Standing Balance: Minimal assistance  Standing Balance  Time: 1-2 minutes x 3  Activity: functional transfers/mobility, toileting, lower body dressing  Comment: with RW.    Functional Mobility  Functional - Mobility Device: Rolling Walker  Activity: To/from bathroom  Assist Level: Minimal assistance  Functional Mobility Comments: Verbal cues for hand placement and safety. Pt demonstrated increased independence with mobility. Pt continues to demonstrate difficulty stepping with RLE   ADL  Feeding: Stand by assistance  Grooming: Stand by assistance  UE Bathing: Minimal assistance  LE Bathing: Moderate assistance  UE Dressing: Minimal assistance  LE Dressing: Moderate assistance  Toileting: Minimal assistance  Additional Comments: ADL scores based on clincal reasoning and skilled observation. Pt completed toileting task with min A while standing. Pt currently limited due to decreased motor planning, strength, balance, activity tolerance, and cognitive barriers impacting safety and independence with self care tasks     Transfers  Sit to stand: Minimal assistance  Stand to sit: Minimal assistance  Transfer Comments: Verbal cues for hand placement and safety. Toilet Transfers  Toilet - Technique: Ambulating  Equipment Used: Grab bars  Toilet Transfer: Minimal assistance  Toilet Transfers Comments: Verbal cues for hand placement and safety with increased time to complete                             Assessment  Performance deficits / Impairments: Decreased ADL status; Decreased functional mobility ; Decreased ROM; Decreased strength; Decreased safe awareness; Decreased cognition; Decreased endurance; Decreased sensation; Decreased balance; Decreased high-level IADLs; Decreased fine motor control; Decreased coordination  Prognosis: Good  Discharge Recommendations: Patient would benefit from continued therapy after discharge  Activity Tolerance: Patient Tolerated treatment well; Patient limited by fatigue; Treatment limited secondary to decreased cognition  Safety Devices in place: Yes  Type of devices: All fall risk precautions in place; Call light within reach; Chair alarm in place; Gait belt; Patient at risk for falls;  Left in chair; Nurse notified             Patient Education: safety with mobility and transfers     Learner:patient  Method: explanation       Outcome: demonstrated understanding     Plan  Safety Devices  Safety Devices in place: Yes  Type of devices:  All fall risk precautions in place, Call light within reach, Chair alarm in place, Gait belt, Patient at risk for falls, Left in chair, Nurse notified  Plan  Times per week: 5-7  Current Treatment Recommendations: Self-Care / ADL, Strengthening, ROM, Balance Training, Functional Mobility Training, Endurance Training, Cognitive Reorientation, Safety Education & Training, Patient/Caregiver Education & Training, Equipment Evaluation, Education, & procurement, Home Management Training, Cognitive/Perceptual Training      Goals  Short term goals  Time Frame for Short term goals: By discharge  Short term goal 1: Pt will complete lower body dressing/bathing with CGA and good safety with use fo AE as needed (Goal updated 11/12 Jules Ornelas OTR/L)  Short term goal 2:  Pt will complete upper body dressing/bathing with set-up A and good attention to task (Goal updated 11/12 Jules Ornelas OTR/L)  Short term goal 3:  Pt will complete functional transfers/mobility during self care tasks with SBA and good safety with use of least restrictive device (Goal updated 11/12 Jules Ornelas OTR/L)  Short term goal 4: Pt will tolerate standing 3+ minutes during functional activity of choice with min A and good safety 5  Short term goal 5: Pt will participate in 15+ minutes of therapeutic exercises/functional activities to increase safety and independence with self care and mobility    OT Individual Minutes  Time In: [address] [place], [place] [address]   Time Out: [address]  Minutes: 44      Electronically signed by Jules Ornelas OT on 11/12/21 at 3:50 PM EST

## 2021-11-12 NOTE — PROCEDURES
EEG REPORT       Patient: Dipesh Johnston Age: 59 y.o. MRN: [id number]    Date: 11/10/2021  Referring Provider: No ref. provider found    History: This routine 30 minute scalp EEG was recorded with video- monitoring for a 59 y.o. Pinky Angles female  who presented with encephalopathy. This EEG was performed to evaluate for focal and epileptiform abnormalities.      Dipesh Johnston   Current Facility-Administered Medications   Medication Dose Route Frequency Provider Last Rate Last Admin    docusate sodium (COLACE) capsule 100 mg  100 mg Oral Daily Mirella Triplett MD   100 mg at 11/12/21 1008    sodium citrate 4 % injection 1.9 mL  1.9 mL IntraCATHeter PRN Keenan Marin MD   1.9 mL at 11/12/21 1324    sodium citrate 4 % injection 1.9 mL  1.9 mL IntraCATHeter PRN Keenan Marin MD   1.9 mL at 11/12/21 1324    anticoagulant sodium citrate 4 % injection 1.9 mL  1.9 mL IntraCATHeter PRN Keenan ChallengeMD foster        anticoagulant sodium citrate 4 % injection 1.9 mL  1.9 mL IntraCATHeter PRN Keenan ChallengeMD foster        sodium chloride flush 0.9 % injection 10 mL  10 mL IntraVENous PRN Ganga Sheffield MD   10 mL at 11/10/21 1622    aspirin chewable tablet 81 mg  81 mg Oral Daily Joella Romberg, MD   81 mg at 11/12/21 1008    atorvastatin (LIPITOR) tablet 80 mg  80 mg Oral Daily Joella Romberg, MD   80 mg at 11/12/21 1009    busPIRone (BUSPAR) tablet 7.5 mg  7.5 mg Oral TID Joella Romberg, MD   7.5 mg at 11/12/21 1008    carvedilol (COREG) tablet 6.25 mg  6.25 mg Oral BID Joella Romberg, MD   6.25 mg at 11/11/21 2328    apixaban (ELIQUIS) tablet 5 mg  5 mg Oral BID Joella Romberg, MD   5 mg at 11/12/21 1008    febuxostat (ULORIC) tablet 40 mg  40 mg Oral Daily Joella Romberg, MD   40 mg at 11/12/21 1012    ferrous sulfate (IRON 325) tablet 325 mg  325 mg Oral BID  Joella Romberg, MD        furosemide (LASIX) tablet 80 mg  80 mg Oral BID Joella Romberg, MD   80 mg at 11/11/21 7588    gabapentin (NEURONTIN) capsule 300 mg  300 mg Oral BID Randy Joe MD   300 mg at 11/12/21 1008    insulin glargine (LANTUS) injection vial 53 Units  53 Units SubCUTAneous BID Randy Joe MD   53 Units at 11/12/21 1006    insulin lispro (HUMALOG) injection vial 0-18 Units  0-18 Units SubCUTAneous TID WC Randy Joe MD   9 Units at 11/12/21 1006    insulin lispro (HUMALOG) injection vial 0-9 Units  0-9 Units SubCUTAneous Nightly Randy Joe MD   7 Units at 11/10/21 2241    pantoprazole (PROTONIX) tablet 40 mg  40 mg Oral QAM AC Randy Joe MD   40 mg at 11/12/21 0626    ranolazine (RANEXA) extended release tablet 1,000 mg  1,000 mg Oral BID Randy Joe MD   1,000 mg at 11/12/21 1007    tamsulosin (FLOMAX) capsule 0.4 mg  0.4 mg Oral Daily Randy Joe MD   0.4 mg at 11/12/21 1008    traZODone (DESYREL) tablet 75 mg  75 mg Oral Nightly Randy Joe MD   75 mg at 11/11/21 2328    sodium chloride flush 0.9 % injection 5-40 mL  5-40 mL IntraVENous 2 times per day Randy Joe MD   10 mL at 11/12/21 1011    sodium chloride flush 0.9 % injection 5-40 mL  5-40 mL IntraVENous PRN Randy Joe MD        0.9 % sodium chloride infusion  25 mL IntraVENous PRN Randy Joe MD        acetaminophen (TYLENOL) tablet 650 mg  650 mg Oral Q4H PRN Randy Joe MD   650 mg at 11/11/21 1250    ondansetron (ZOFRAN-ODT) disintegrating tablet 4 mg  4 mg Oral Q8H PRN Randy Joe MD        Or    ondansetron [facility]) injection 4 mg  4 mg IntraVENous Q6H PRN Randy Joe MD        glucose (GLUTOSE) 40 % oral gel 15 g  15 g Oral PRN Randy Joe MD        dextrose 50 % IV solution  12.5 g IntraVENous PRN Randy Joe MD        glucagon (rDNA) injection 1 mg  1 mg IntraMUSCular PRN Randy Joe MD        dextrose 5 % solution  100 mL/hr IntraVENous PRN Randy Joe MD            Technical Description:  This is a 21 channel digital EEG recording with time-locked video. Electrodes were placed in accordance with the 10-20 International System of Electrode Placement. Single lead EKG monitoring as well as temporal electrodes were included. The patient was not sleep deprived. This recording was obtained during wakefulness. EEG Description: The dominant background activity during maximal recorded wakefulness consisted of bioccipitally dominant 9-10 Hz, 25-35 uV symmetric, regular activity that was reactive to eye opening. During drowsiness, the background rhythm waxed and waned and there were periods of slowing. During stage II sleep symmetric V waves, K complexes, and sleep spindles were seen. There was appropriate diffuse delta activity during slow wave sleep. Photic stimulation - stepwise photic stimulation at 2-30 Hz was performed and there was a biposterior, symmetric, driving response. Hyperventilation - was not preformed. No abnormalities were activated by photic stimulation     The EKG channel demonstrated a normal sinus rhythm. Interpretation  This EEG was normal in wakefulness and sleep. Clinical correlation  This EEG was normal. No focal or epileptiform abnormalities were seen.     Kristine Osorio MD  Diplomate, American Board of Psychiatry and Neurology  Diplomate, American Board of Clinical Neurophysiology  Diplomate, American Board of Epilepsy

## 2021-11-12 NOTE — PROGRESS NOTES
HEMODIALYSIS POST TREATMENT NOTE    Treatment time ordered: 2.5 Hours    Actual treatment time: 2.5 Hours    UltraFiltration Goal: 2000 ml  UltraFiltration Removed: 2000 ml      Pre Treatment weight: 108.8 kg  Post Treatment weight: 106.8 kg  Estimated Dry Weight: 110 kg per outpatient records    Access used:     Central Venous Catheter:          Tunneled or Non-tunneled: Tunneled           Site: Right chest wall          Access Flow: Good with lines reversed due to high arterial pressures      Internal Access:       AV Fistula or AV Graft:          Site:        Access Flow:        Sign and symptoms of infection:        If YES:     Medications or blood products given: None    Chronic outpatient schedule: [facility]    Chronic outpatient unit: [facility]    Summary of response to treatment Patient awake and denies any discomfort. Treatment ran well. Target fluid goal met. Explain if orders NOT met, was physician notified:      Juan Antonio Iyer faxed to patient unit/ placed in patient chart: Yes    Post assessment completed: Yes    Report given to: Emmett Hutchison  [facility] Alex documented Safety Checks include the followin) Access and face visible at all times. 2) All connections and blood lines are secure with no kinks. 3) NVL alarm engaged. 4) Hemosafe device applied (if applicable). 5) No collapse of Arterial or Venous blood chambers. 6) All blood lines / pump segments in the air detectors.

## 2021-11-12 NOTE — PROGRESS NOTES
Speech Language Pathology  Speech Language Pathology  [facility]     Cognitive Treatment Note    Date: 11/12/2021  Patients Name: Sunny Rivera  MRN: [id number]  Diagnosis: Cognitive impairment  Patient Active Problem List   Diagnosis Code    Atherosclerosis of coronary artery bypass graft of native heart without angina pectoris I25.810    Acute renal failure ([facility] N17.9    Diabetes mellitus due to underlying condition with diabetic nephropathy, with long-term current use of insulin ([facility]) E08.21, Z79.4    Chronic diastolic heart failure ([facility] I50.32    Diabetic polyneuropathy associated with type 2 diabetes mellitus ([facility]) E11.42    History of coronary artery bypass graft Z95.1    Iron deficiency anemia D50.9    Spinal stenosis of lumbar region with neurogenic claudication M48.062    Mixed hyperlipidemia E78.2    Stage 4 chronic kidney disease ([facility]) N18.4    Type 2 diabetes mellitus with kidney complication, with long-term current use of insulin ([facility]) E11.29, Z79.4    Syncope and collapse R55    Obesity, Class II, BMI 35-39.9 E66.9    Thyroid nodule greater than or equal to 1 cm in diameter incidentally noted on imaging study E04.1    Essential hypertension I10    Anemia in stage 4 chronic kidney disease ([facility]) N18.4, D63.1    Chronic ischemic heart disease I25.9    Ischemic stroke of frontal lobe ([facility]) I63.9    Stroke-like symptoms R29.90    Morbid obesity with BMI of 40.0-44.9, adult ([facility]) E66.01, Z68.41    Acute encephalopathy G93.40    Disequilibrium syndrome E87.8    Debility R53.81    Long-term memory loss R41.3    Muscle right arm weakness M62.81    Anxiety F41.9    Chronic midline low back pain with bilateral sciatica M54.41, M54.42, G89.29    Need for immunization against influenza Z23    Altered mental status R41.82       Pain: no     Cognitive Treatment    Treatment time: 2595-0239      Subjective: [x] Alert [x] Cooperative     [] Confused     [] Agitated    [] Lethargic      Objective/Assessment:  Attention: sustained throughout session, distractions in room minimized    Orientation: Oriented to place, JULIAN, month (pt stated 11th but unable to recall associated month). Pt disoriented to year (improved with choice of 2) and president (improved with choice 2). Recall: image retention:  Immediate: 90% accuracy  Delayed: 100% accuracy     Pt stated, \"I can remember the short term stuff, it's the long term I forget\". Other: Pt reports new onset of spelling difficulty when texting family friends. Pt completed a writing to dictation task:  Single words: (3 letter)- 100% accuracy                         (4 letter) 50% accuracy   Sentences: 3 word, simple-75%  Latent response time noted with all writing tasks, pt frequently asking for help with spelling throughout but managing to spell independently with extra time. Significant pt ed provided throughout session re use of compensatory memory aides to facilitate recall of functional info. Pt encouraged to utilize her \"memory book\" on bedside table and to write down functional information/new information learned throughout the day. Pt verbalized understanding of all recommendations. Plan:  [x] Continue ST services    [] Discharge from ST:      Discharge recommendations: []  Further therapy recommended at discharge. The patient should be able to tolerate at least 3 hours of therapy per day over 5 days or 15 hours over 7 days. [] Further therapy recommended at discharge. [] No therapy recommended at discharge. Treatment completed by:  Barbie Crowder, BRAYAN, M.A., [address] [facility]

## 2021-11-12 NOTE — CONSULTS
Physical Medicine & Rehabilitation  Consult Note      Admitting Physician:   Mandie Galan MD    Primary Care Provider:   AFSANEH Mark CNP     Reason for Consult:  Acute Inpatient Rehabilitation    Chief Complaint: AMS, Fatigue, Headache    History of Present Illness:  Referring Provider is requesting an evaluation for appropriate placement upon discharge from acute care. History from chart review and patient. Madelyn Munoz is a 59 y.o. RHD female admitted to [facility] on 11/10/2021. Patient admitted with episode of AMS and weakness R extremities at dialysis. She was having acute memory loss. She is known from 2 previous acute rehab admissions after ischemic CVA. Nephrology consulted to manage ESRD and hemodialysis on MWF. Neurology consulted. MRI negative for acute changes. Planning EEG. If recurrent amnesia continues they will consider lumbar puncture to rule out encephalitis. She has known 70% R ICA stenosis. Patient seen in hemodialysis. She notes that her memory is mildly improved since admission. She now recognizes her sister. She remembers me from previous admission. She denies pain but reports numbness from mid forearm extending to all digits in her R hand. She also notes significant weakness in her R leg. These have worsened from her previous level of function.     Review of Systems:  Constitutional: negative for anorexia, chills, fatigue, fevers, sweats and weight loss  Eyes: negative for redness and visual disturbance  Ears, nose, mouth, throat, and face: negative for earaches, sore throat and tinnitus  Respiratory: negative for cough and shortness of breath  Cardiovascular: negative for chest pain, dyspnea and palpitations  Gastrointestinal: negative for abdominal pain, change in bowel habits, constipation, nausea and vomiting  Genitourinary:negative for dysuria, frequency, hesitancy and urinary incontinence  Integument/breast: negative for pruritus and rash  Musculoskeletal:negative for stiff joints  Neurological: negative for dizziness, headaches   Behavioral/Psych: negative for decreased appetite, depression and fatigue       Premorbid function:  Modified Independent    Current function:    PT:  Restrictions/Precautions: Contact Precautions, Fall Risk  Other position/activity restrictions: Hemodialysis MWF  Required Braces or Orthoses  Right Lower Extremity Brace: ((Lymphedema wraps))  Left Lower Extremity Brace: ((Lymphedema wraps))   Transfers  Sit to Stand: 2 Person Assistance, Moderate Assistance  Stand to sit: Moderate Assistance, 2 Person Assistance  Comment: Pt stood at bedside with RW, very fearfull, emotional, ramiro to WB slightly on R LE, able to slide feet towards HOB, but not able to pick her legs up for a step. Pt seems to have decreased motor planning, decreased coordination and strength for fucntional tasks. Transfers  Sit to Stand: 2 Person Assistance, Moderate Assistance  Stand to sit: Moderate Assistance, 2 Person Assistance  Comment: Pt stood at bedside with RW, very fearfull, emotional, ramiro to WB slightly on R LE, able to slide feet towards HOB, but not able to pick her legs up for a step. Pt seems to have decreased motor planning, decreased coordination and strength for fucntional tasks. Ambulation  Ambulation?: No                 OT:   ADL  Feeding: Minimal assistance  Grooming: Minimal assistance  UE Bathing: Moderate assistance  LE Bathing: Maximum assistance  UE Dressing: Moderate assistance  LE Dressing: Dependent/Total  Toileting: Dependent/Total  Additional Comments: ADL scores based on clinical reasoning and skilled observation unless otherwise noted. Pt currently limited due to decreased ROM/strength RUE, balance, activity tolerance, and cognitive barriers impacting safety and independence with self care tasks         Balance  Sitting Balance: Contact guard assistance  Standing Balance:  Moderate assistance (x2)   Standing Balance  Time: 1-2 minutes x 2  Activity: standing EOB with small side step towards HOB  Comment: with RW. Pt reporting decreased sensation in R side of body while standing. Functional Mobility  Functional - Mobility Device: Rolling Walker  Activity: Other (small side steps to [facility])  Assist Level: Moderate assistance (x2)  Functional Mobility Comments: Verbal cues for hand placement and safety. Decreased motor planning noted. pt unable to  feet at this time. Bed mobility  Supine to Sit: 2 Person assistance, Minimal assistance  Sit to Supine: 2 Person assistance, Minimal assistance  Scooting: Moderate assistance (seated-scooting to EOB.)  Comment: Verbal cues for sequencing and increased time to complete. Transfers  Sit to stand: 2 Person assistance, Moderate assistance  Stand to sit: 2 Person assistance, Moderate assistance  Transfer Comments: Verbal cues for hand placement and safety. SLP:  Subjective: [x]? Alert     [x]? Cooperative     []? Confused     []? Agitated    []? Lethargic        Objective/Assessment:  Attention: sustained throughout session, distractions in room minimized     Orientation: Oriented to place, JULIAN, month (pt stated 11th but unable to recall associated month). Pt disoriented to year (improved with choice of 2) and president (improved with choice 2).      Recall: image retention:  Immediate: 90% accuracy  Delayed: 100% accuracy      Pt stated, \"I can remember the short term stuff, it's the long term I forget\".      Other: Pt reports new onset of spelling difficulty when texting family friends.  Pt completed a writing to dictation task:  Single words: (3 letter)- 100% accuracy                         (4 letter) 50% accuracy   Sentences: 3 word, simple-75%  Latent response time noted with all writing tasks, pt frequently asking for help with spelling throughout but managing to spell independently with extra time.      Significant pt ed provided throughout session re use of compensatory memory aides to facilitate recall of functional info. Pt encouraged to utilize her \"memory book\" on bedside table and to write down functional information/new information learned throughout the day. Pt verbalized understanding of all recommendations. Past Medical History:        Diagnosis Date    Backache, unspecified     CHF (congestive heart failure) (HCC)     Chronic kidney disease     Coronary atherosclerosis of artery bypass graft     Cramp of limb     Gallstones     Hypertension     Insomnia     Pneumonia     Type II or unspecified type diabetes mellitus with renal manifestations, not stated as uncontrolled(250.40)     Type II or unspecified type diabetes mellitus without mention of complication, not stated as uncontrolled     Unspecified vitamin D deficiency        Past Surgical History:        Procedure Laterality Date    ANKLE FRACTURE SURGERY      BREAST SURGERY      CARPAL TUNNEL RELEASE      CHOLECYSTECTOMY, OPEN N/A     CORONARY ARTERY BYPASS GRAFT      x3    HAND SURGERY      IR TUNNELED CATHETER PLACEMENT GREATER THAN 5 YEARS  8/18/2021    IR TUNNELED CATHETER PLACEMENT GREATER THAN 5 YEARS 8/18/2021 [facility] SPECIAL PROCEDURES    KNEE ARTHROSCOPY      right    TONSILLECTOMY         Allergies:     Allergies   Allergen Reactions    Adhesive Tape Other (See Comments) and Rash     Other reaction(s): Unknown    Ace Inhibitors Other (See Comments)     cough    Iv Dye [Iodides]      Stage 4 kidney disease    Metformin And Related Hives, Itching and Rash        Current Medications:   Current Facility-Administered Medications: docusate sodium (COLACE) capsule 100 mg, 100 mg, Oral, Daily  sodium chloride flush 0.9 % injection 10 mL, 10 mL, IntraVENous, PRN  aspirin chewable tablet 81 mg, 81 mg, Oral, Daily  atorvastatin (LIPITOR) tablet 80 mg, 80 mg, Oral, Daily  busPIRone (BUSPAR) tablet 7.5 mg, 7.5 mg, Oral, TID  carvedilol (COREG) tablet 6.25 mg, 6.25 mg, Oral, BID  apixaban (ELIQUIS) tablet 5 mg, 5 mg, Oral, BID  febuxostat (ULORIC) tablet 40 mg, 40 mg, Oral, Daily  ferrous sulfate (IRON 325) tablet 325 mg, 325 mg, Oral, BID WC  furosemide (LASIX) tablet 80 mg, 80 mg, Oral, BID  gabapentin (NEURONTIN) capsule 300 mg, 300 mg, Oral, BID  insulin glargine (LANTUS) injection vial 53 Units, 53 Units, SubCUTAneous, BID  insulin lispro (HUMALOG) injection vial 0-18 Units, 0-18 Units, SubCUTAneous, TID WC  insulin lispro (HUMALOG) injection vial 0-9 Units, 0-9 Units, SubCUTAneous, Nightly  pantoprazole (PROTONIX) tablet 40 mg, 40 mg, Oral, QAM AC  ranolazine (RANEXA) extended release tablet 1,000 mg, 1,000 mg, Oral, BID  tamsulosin (FLOMAX) capsule 0.4 mg, 0.4 mg, Oral, Daily  traZODone (DESYREL) tablet 75 mg, 75 mg, Oral, Nightly  sodium chloride flush 0.9 % injection 5-40 mL, 5-40 mL, IntraVENous, 2 times per day  sodium chloride flush 0.9 % injection 5-40 mL, 5-40 mL, IntraVENous, PRN  0.9 % sodium chloride infusion, 25 mL, IntraVENous, PRN  acetaminophen (TYLENOL) tablet 650 mg, 650 mg, Oral, Q4H PRN  ondansetron (ZOFRAN-ODT) disintegrating tablet 4 mg, 4 mg, Oral, Q8H PRN **OR** ondansetron (ZOFRAN) injection 4 mg, 4 mg, IntraVENous, Q6H PRN  glucose (GLUTOSE) 40 % oral gel 15 g, 15 g, Oral, PRN  dextrose 50 % IV solution, 12.5 g, IntraVENous, PRN  glucagon (rDNA) injection 1 mg, 1 mg, IntraMUSCular, PRN  dextrose 5 % solution, 100 mL/hr, IntraVENous, PRN    Social History:  Lives With: Family (Mother)  Type of Home:  (Condo)  Home Layout: One level  Home Access: Ramped entrance  Bathroom Shower/Tub: Walk-in shower, Doors (Threshold to step over)  Bathroom Toilet: Handicap height  Bathroom Equipment: Grab bars in shower, Built-in shower seat (Uses wall of shower to get off toilet)  Bathroom Accessibility: Walker accessible  Home Equipment: 4 wheeled walker, Cane, Sock aid (HAs a recliner at home)  ADL Assistance: Independent  Homemaking Assistance: Needs assistance (sisters assist )  Ambulation Assistance: Independent (3GA )  Transfer Assistance: Independent  Active : No  Patient's  Info: family-sister  Mode of Transportation: Car  IADL Comments: Pt sleeps in flat bed at home. Additional Comments: Pt recently discharged from Rehab at [facility]  on 8/26/21 and again on 9-26-21. Pt was going to dialysis 3 times per week. Pt is demonstrating some confusion/memory deficits and above information taken from previous therapy eval 8-12-21. Pt able to identify that her mother is still living with her and her sisters assist with caring for her mother. Social History     Socioeconomic History    Marital status: Single     Spouse name: None    Number of children: None    Years of education: None    Highest education level: None   Occupational History    None   Tobacco Use    Smoking status: Never Smoker    Smokeless tobacco: Never Used   Vaping Use    Vaping Use: Never used   Substance and Sexual Activity    Alcohol use: No    Drug use: No    Sexual activity: Not Currently   Other Topics Concern    None   Social History Narrative    None     Social Determinants of Health     Financial Resource Strain: Low Risk     Difficulty of Paying Living Expenses: Not hard at all   Food Insecurity:     Worried About [address] in the Last Year: Not on file    Nany of Food in the Last Year: Not on file   Transportation Needs:     Lack of Transportation (Medical): Not on file    Lack of Transportation (Non-Medical):  Not on file   Physical Activity:     Days of Exercise per Week: Not on file    Minutes of Exercise per Session: Not on file   Stress:     Feeling of Stress : Not on file   Social Connections:     Frequency of Communication with Friends and Family: Not on file    Frequency of Social Gatherings with Friends and Family: Not on file    Attends [employer] Services: Not on file    Active Member of Clubs or Organizations: Not on file    Attends Club or Organization Meetings: Not on file    Marital Status: Not on file   Intimate Partner Violence:     Fear of Current or Ex-Partner: Not on file    Emotionally Abused: Not on file    Physically Abused: Not on file    Sexually Abused: Not on file   Housing Stability:     Unable to Pay for Housing in the Last Year: Not on file    Number of [place] in the Last Year: Not on file    Unstable Housing in the Last Year: Not on file       Family History:       Problem Relation Age of Onset    Diabetes Father     Heart Failure Father        Diagnostics:    MRI BRAIN 11/11/21  FINDINGS:   There is no acute infarction, intracranial hemorrhage, or intracranial mass   lesion. No mass effect, midline shift, or extra-axial collection is noted.       There are mild nonspecific foci of periventricular and subcortical cerebral   white matter T2/FLAIR hyperintensity, most likely representing chronic   microangiopathic disease in this age group.  Chronic encephalomalacia   involving the left occipital lobe.  The brain parenchyma is otherwise normal.   The pituitary gland is normal in appearance. The cerebellar tonsils are in   normal position.       The ventricles, sulci, and cisterns are prominent suggestive of generalized   volume loss. The intracranial flow voids are preserved.       The globes and orbits are within normal limits. The visualized extracranial   structures including paranasal sinuses and mastoid air cells are unremarkable.           Impression       Stable study. [facility] is no acute infarction, intracranial hemorrhage, or   intracranial mass lesion.       Mild chronic microangiopathic ischemic disease.       Mild generalized volume loss.       Chronic encephalomalacia involving the left occipital lobe.      CT HEAD 11/10/21  FINDINGS:   BRAIN/VENTRICLES: There is no acute intracranial hemorrhage, mass effect, or   midline shift.  There is satisfactory overall gray-white matter   differentiation.  The ventricular structures are symmetric and unremarkable. The infratentorial structures are unremarkable.       ORBITS: The visualized portion of the orbits demonstrate no acute abnormality.       SINUSES: The visualized paranasal sinuses and mastoid air cells demonstrate   no acute abnormality.       SOFT TISSUES/SKULL:  No acute abnormality of the visualized skull or soft   tissues.           Impression   No acute intracranial abnormality. CTA HEAD NECK 11/10/21  FINDINGS:       CTA NECK:       AORTIC ARCH/ARCH VESSELS: No dissection or arterial injury.  No significant   stenosis of the brachiocephalic or subclavian arteries.       CAROTID ARTERIES: No dissection, arterial injury, or hemodynamically   significant stenosis by NASCET criteria.       VERTEBRAL ARTERIES: No dissection, arterial injury, or significant stenosis.       SOFT TISSUES: There is ground-glass attenuation in the bilateral lung apices,   likely related to underdistention versus mild pulmonary vascular congestion. No cervical or superior mediastinal lymphadenopathy.  The larynx and pharynx   are unremarkable.  No acute abnormality of the salivary and thyroid glands.       BONES: No acute osseous abnormality.  There are postoperative changes in the   cervical spine.           CTA HEAD:       ANTERIOR CIRCULATION: There is 30% stenosis in the intracranial left internal   carotid artery.  There is up to 70% stenosis in the cavernous/supraclinoid   segment of the right internal carotid artery.  No significant stenosis of the   anterior cerebral, or middle cerebral arteries. No aneurysm.       POSTERIOR CIRCULATION: No significant stenosis of the vertebral, basilar, or   posterior cerebral arteries. No aneurysm.       OTHER: No dural venous sinus thrombosis on this non-dedicated study.       BRAIN: No mass effect or midline shift. No extra-axial fluid collection.  The   gray-white differentiation is maintained.       There is minimal punctate calcification along the posterior aspect of the   right globe.           Impression   70% stenosis in the cavernous/supraclinoid segment of the right internal   carotid artery.       Otherwise, no acute abnormality or flow-limiting stenosis in the remainder of   the major arteries of the head and neck.       Minimal punctate calcification along the posterior aspect of the right globe. Follow-up with ophthalmology exam is recommended. CXR 11/10/21  FINDINGS:   Limited exam secondary to underpenetration and positioning.  Central catheter   is similar position.  The heart is enlarged, unchanged.  The mediastinal and   cardiac silhouette is mildly obscured, which is similar to prior.  There are   low lung volumes with bibasilar consolidations.  Diffuse bronchial   thickening.  No large pneumothorax is grossly identified.  The osseous   structures are grossly unchanged.           Impression   Low lung volumes with likely bibasilar atelectasis versus mild edema.  A   superimposed infection is difficult to exclude. CBC:   Recent Labs     11/10/21  1550   WBC 5.1   RBC 3.90*   HGB 13.0   HCT 37.0   MCV 94.8   RDW 15.3*        BMP:   Recent Labs     11/10/21  1550 11/10/21  1559 11/11/21  1209     --   --    K 4.0  --   --      --   --    CO2 22  --   --    PHOS  --   --  3.3   BUN 16  --   --    CREATININE 1.42* 1.18  --    GLUCOSE 347*  --   --       HbA1c:   Lab Results   Component Value Date    LABA1C 8.9 (H) 03/15/2021     BNP: No results for input(s): BNP in the last 72 hours.   PT/INR:   Recent Labs     11/10/21  1550   PROTIME 12.7   INR 0.9     APTT:   Recent Labs     11/10/21  1550   APTT 27.5     CARDIAC ENZYMES:   Recent Labs     11/10/21  1550 11/10/21  2015   TROPONINT NOT REPORTED NOT REPORTED    troponins    FASTING LIPID PANEL:  Lab Results   Component Value Date    CHOL 105 04/09/2015    HDL 43 04/09/2015    TRIG 168 04/09/2015     LIVER PROFILE: No results for input(s): AST, ALT, ALB, BILIDIR, BILITOT, ALKPHOS in the last 72 hours. Physical Exam:  BP (!) 143/63   Pulse 69   Temp 97.5 °F (36.4 °C) (Oral)   Resp 18   Ht 5' 4\" (1.626 m)   Wt 240 lb (108.9 kg)   SpO2 90%   BMI 41.20 kg/m²     GEN: Well developed, well nourished, no acute distress. HEENT: NCAT, PERRL, EOMI, mucous membranes pink and moist.  RESP: Lungs clear to auscultation. No rales or rhonchi. Respirations WNL and unlabored. CV: Regular rate rhythm. No murmurs, rubs, or gallops. ABD: soft, non-distended, non-tender. BS+ and equal.  NEURO: A&O x 3 - impaired shot-term memory. Sensation intact to light touch. DTRs 2+  MSK: Functional ROM LUE and LLE. Impaired AROM RUE and RLE. [place] Muscle tone and bulk are normal bilaterally. Strength 3/5 R , 4/5 R elbow flexion and extension, 4/5 R shoulder flexion. R hip flexion 3/5, R knee extension 4-/5. Key muscles LUE 5/5. EXT: No calf tenderness to palpation or edema BLEs. SKIN: Warm dry and intact with good turgor. PSYCH: Mood WNL. Affect WNL. Appropriately interactive. Impression:  Stroke like symptoms: MRI negative for acute changes. EEG pending. Declined functional level impairing her ADLs and mobility since her discharge from acute rehab 9/26/2021. At that time she was ambulating >280 feet with rollator and SBA, she performed 9 stairs. She required min assist to don AGUSTIN stockings, otherwise was modified independent/SBA for ADLs. Chronic diastolic heart failure  HTN/Hyperlipidemia  DM II  ESRD on HD MWF  Anemia of chronic disease  Morbid obesity  Anxiety  Disequilibrium    Recommendations:    Diagnosis:  Stroke like symptoms with functional decline from previous stroke deficit. Therapy: Has PT/OT/SLP needs  Medical Necessity: As above  Support: Lives with her mother. Has some assistance from her sister  Rehab Recommendation: The patient will benefit from acute inpatient rehabilitation once medically stable per primary and consulting services.  Anticipate she will be able to tolerate 3 hours of therapy per day or 900 minutes per week in rehabilitation. The patient requires multidisciplinary rehabilitation treatment including medical management by a PM&R physician, 24 hour rehabilitation nursing, Physical/Occupational therapy, speech therapy, rehabilitation social work, and nutrition services. Patient and family also require education in post-hospital precautions and home exercise routine, adaptive techniques and deficit compensation strategies, strengthening and conditioning, equipment prescription and instructions in use. DVT Prophylaxis: on Eliquis    It was my pleasure to evaluate Sunny Rivera today. Please call with questions. Doretha Ayoub MD        This note is created with the assistance of a speech recognition program.  While intending to generate a document that actually reflects the content of the visit, the document can still have some errors including those of syntax and sound a like substitutions which may escape proof reading. In such instances, actual meaning can be extrapolated by contextual diversion.

## 2021-11-12 NOTE — PROGRESS NOTES
Progress Note  Date:2021       Room:[address] [place] [address]  Patient Ramses Ventura     YOB: 1960     Age:61 y.o. Subjective    Subjective:  Symptoms:  Stable. Diet:  Adequate intake. Activity level: Impaired due to weakness. Pain:  She reports no pain. Review of Systems  Objective         Vitals Last 24 Hours:  TEMPERATURE:  Temp  Av.6 °F (36.4 °C)  Min: 97.5 °F (36.4 °C)  Max: 97.7 °F (36.5 °C)  RESPIRATIONS RANGE: Resp  Av.3  Min: 14  Max: 18  PULSE OXIMETRY RANGE: SpO2  Av.8 %  Min: 90 %  Max: 93 %  PULSE RANGE: Pulse  Av.3  Min: 56  Max: 69  BLOOD PRESSURE RANGE: Systolic (15VCR), LQM:581 , Min:129 , XKY:015   ; Diastolic (22VVK), QQQ:34, Min:40, Max:63    I/O (24Hr): Intake/Output Summary (Last 24 hours) at 2021 0752  Last data filed at 2021 1745  Gross per 24 hour   Intake 1050 ml   Output 1000 ml   Net 50 ml     Objective:  General Appearance:  Comfortable. Vital signs: (most recent): Blood pressure (!) 143/63, pulse 69, temperature 97.5 °F (36.4 °C), temperature source Oral, resp. rate 18, height 5' 4\" (1.626 m), weight 240 lb (108.9 kg), SpO2 90 %. Vital signs are normal.    Lungs:  Normal effort and normal respiratory rate. Breath sounds clear to auscultation. Heart: Normal rate.   S1 normal and S2 normal.      Labs/Imaging/Diagnostics    Labs:  CBC:  Recent Labs     11/10/21  1550   WBC 5.1   RBC 3.90*   HGB 13.0   HCT 37.0   MCV 94.8   RDW 15.3*        CHEMISTRIES:  Recent Labs     11/10/21  1550 11/10/21  1559 21  1209     --   --    K 4.0  --   --      --   --    CO2 22  --   --    BUN 16  --   --    CREATININE 1.42* 1.18  --    GLUCOSE 347*  --   --    PHOS  --   --  3.3   MG 1.9  --   --      PT/INR:  Recent Labs     11/10/21  1550   PROTIME 12.7   INR 0.9     APTT:  Recent Labs     11/10/21  1550   APTT 27.5     LIVER PROFILE:No results for input(s): AST, ALT, BILIDIR, BILITOT, ALKPHOS in the last 72 hours. Imaging Last 24 Hours:  CT Head WO Contrast    Result Date: 11/10/2021  EXAMINATION: CT OF THE HEAD WITHOUT CONTRAST  11/10/2021 4:19 pm TECHNIQUE: CT of the head was performed without the administration of intravenous contrast. Dose modulation, iterative reconstruction, and/or weight based adjustment of the mA/kV was utilized to reduce the radiation dose to as low as reasonably achievable. COMPARISON: MRI brain performed 09/13/2021. HISTORY: ORDERING SYSTEM PROVIDED HISTORY: severe HA stroke alert TECHNOLOGIST PROVIDED HISTORY: severe HA stroke alert Decision Support Exception - unselect if not a suspected or confirmed emergency medical condition->Emergency Medical Condition (MA) Reason for Exam: severe HA stroke alert Acuity: Unknown Type of Exam: Unknown FINDINGS: BRAIN/VENTRICLES: There is no acute intracranial hemorrhage, mass effect, or midline shift. There is satisfactory overall gray-white matter differentiation. The ventricular structures are symmetric and unremarkable. The infratentorial structures are unremarkable. ORBITS: The visualized portion of the orbits demonstrate no acute abnormality. SINUSES: The visualized paranasal sinuses and mastoid air cells demonstrate no acute abnormality. SOFT TISSUES/SKULL:  No acute abnormality of the visualized skull or soft tissues. No acute intracranial abnormality. Findings were discussed with Kathy Perea at 4:25 pm on 11/10/2021. XR CHEST PORTABLE    Result Date: 11/10/2021  EXAMINATION: ONE XRAY VIEW OF THE CHEST 11/10/2021 4:27 pm COMPARISON: 09/16/2021 HISTORY: ORDERING SYSTEM PROVIDED HISTORY: DIVINA URBANO TECHNOLOGIST PROVIDED HISTORY: YOLIE, DIVINA Reason for Exam: Pt unable to give hx at the time of xray. Best image per pt condition Acuity: Unknown Type of Exam: Unknown Additional signs and symptoms: Pt unable to give hx at the time of xray. Best image per pt condition FINDINGS: Limited exam secondary to underpenetration and positioning. Central catheter is similar position. The heart is enlarged, unchanged. The mediastinal and cardiac silhouette is mildly obscured, which is similar to prior. There are low lung volumes with bibasilar consolidations. Diffuse bronchial thickening. No large pneumothorax is grossly identified. The osseous structures are grossly unchanged. Low lung volumes with likely bibasilar atelectasis versus mild edema. A superimposed infection is difficult to exclude. CTA HEAD NECK W CONTRAST    Result Date: 11/10/2021  EXAMINATION: CTA OF THE HEAD AND NECK WITH CONTRAST 11/10/2021 4:20 pm: TECHNIQUE: CTA of the head and neck was performed with the administration of intravenous contrast. Multiplanar reformatted images are provided for review. MIP images are provided for review. Stenosis of the internal carotid arteries measured using NASCET criteria. Dose modulation, iterative reconstruction, and/or weight based adjustment of the mA/kV was utilized to reduce the radiation dose to as low as reasonably achievable. COMPARISON: Noncontrast CT head from earlier today, MRA neck September 13, 2021 HISTORY: ORDERING SYSTEM PROVIDED HISTORY: stroke alert TECHNOLOGIST PROVIDED HISTORY: stroke alert Decision Support Exception - unselect if not a suspected or confirmed emergency medical condition->Emergency Medical Condition (MA) Reason for Exam: ha, stroke alert Acuity: Unknown Type of Exam: Unknown FINDINGS: CTA NECK: AORTIC ARCH/ARCH VESSELS: No dissection or arterial injury. No significant stenosis of the brachiocephalic or subclavian arteries. CAROTID ARTERIES: No dissection, arterial injury, or hemodynamically significant stenosis by NASCET criteria. VERTEBRAL ARTERIES: No dissection, arterial injury, or significant stenosis. SOFT TISSUES: There is ground-glass attenuation in the bilateral lung apices, likely related to underdistention versus mild pulmonary vascular congestion.  No cervical or superior mediastinal lymphadenopathy. The larynx and pharynx are unremarkable. No acute abnormality of the salivary and thyroid glands. BONES: No acute osseous abnormality. There are postoperative changes in the cervical spine. CTA HEAD: ANTERIOR CIRCULATION: There is 30% stenosis in the intracranial left internal carotid artery. There is up to 70% stenosis in the cavernous/supraclinoid segment of the right internal carotid artery. No significant stenosis of the anterior cerebral, or middle cerebral arteries. No aneurysm. POSTERIOR CIRCULATION: No significant stenosis of the vertebral, basilar, or posterior cerebral arteries. No aneurysm. OTHER: No dural venous sinus thrombosis on this non-dedicated study. BRAIN: No mass effect or midline shift. No extra-axial fluid collection. The gray-white differentiation is maintained. There is minimal punctate calcification along the posterior aspect of the right globe. 70% stenosis in the cavernous/supraclinoid segment of the right internal carotid artery. Otherwise, no acute abnormality or flow-limiting stenosis in the remainder of the major arteries of the head and neck. Minimal punctate calcification along the posterior aspect of the right globe. Follow-up with ophthalmology exam is recommended. MRI BRAIN WO CONTRAST    Result Date: 11/11/2021  EXAMINATION: MRI OF THE BRAIN WITHOUT CONTRAST  11/11/2021 6:38 pm TECHNIQUE: Multiplanar multisequence MRI of the brain was performed without the administration of intravenous contrast. COMPARISON: 09/13/2021 HISTORY: ORDERING SYSTEM PROVIDED HISTORY: stroke TECHNOLOGIST PROVIDED HISTORY: stroke Reason for Exam: stroke like symptoms FINDINGS: There is no acute infarction, intracranial hemorrhage, or intracranial mass lesion. No mass effect, midline shift, or extra-axial collection is noted.  There are mild nonspecific foci of periventricular and subcortical cerebral white matter T2/FLAIR hyperintensity, most likely representing chronic microangiopathic disease in this age group. Chronic encephalomalacia involving the left occipital lobe. The brain parenchyma is otherwise normal. The pituitary gland is normal in appearance. The cerebellar tonsils are in normal position. The ventricles, sulci, and cisterns are prominent suggestive of generalized volume loss. The intracranial flow voids are preserved. The globes and orbits are within normal limits. The visualized extracranial structures including paranasal sinuses and mastoid air cells are unremarkable. Stable study. There is no acute infarction, intracranial hemorrhage, or intracranial mass lesion. Mild chronic microangiopathic ischemic disease. Mild generalized volume loss. Chronic encephalomalacia involving the left occipital lobe.      Assessment//Plan           Hospital Problems           Last Modified POA    * (Principal) Stroke-like symptoms 11/11/2021 Yes    Chronic diastolic heart failure ([address] 11/10/2021 Yes    Mixed hyperlipidemia 11/10/2021 Yes    Type 2 diabetes mellitus with kidney complication, with long-term current use of insulin ([address] 11/10/2021 Yes    Essential hypertension 11/10/2021 Yes    Anemia in stage 4 chronic kidney disease ([address] 11/10/2021 Yes    Morbid obesity with BMI of 40.0-44.9, adult ([address] 11/10/2021 Yes    Disequilibrium syndrome 11/10/2021 Yes    Anxiety 11/10/2021 Yes    Altered mental status 11/11/2021 Yes        Assessment & Plan  Await evaluation by PM&R    Electronically signed by Alma Grider MD on 11/12/21 at 7:52 AM EST

## 2021-11-12 NOTE — PROGRESS NOTES
Department of Internal Medicine  Nephrology Chriss Camejo MD   Consult Note    Reason for consultation: Management of end-stage renal disease. Consulting physician: Osvaldo Manrique MD.    Interval history: Patient was seen and examined today and she feels better and is awake and alert. Right-sided weakness appears to be slightly improved. History of present illness: This is a 59 y.o. female with a significant past medical history of Chronic atrial fibrillation [on Eliquis], Type 2 diabetes mellitus, essential systemic hypertension, coronary artery disease [s/p CABG in 2004 and PTCA in 2019], heart failure with preserved ejection fraction [LVEF 55%] and end-stage renal disease secondary to diabetic and hypertensive nephropathy [on routine hemodialysis on a Monday/Wednesday/Friday schedule at [address] [place] [address] dialysis unit on [facility] under the care of Dr. Lenn Rubinstein since August 2001 using IJ tunneled dialysis catheter], who presented to the hospital for further evaluation of acute on chronic right-sided weakness, Slurred speech and confusion. She was initially diagnosed with acute/subacute stroke in the left frontal lobe with right-sided weakness 4 months ago. CT scan of the brain performed at presentation showed no acute finding but CT angiogram of the head and neck showed 70% stenosis in the cavernous/supraclinoid segment of the right internal carotid artery. There was also minimal punctate calcification along the posterior aspect of the right globe.     Scheduled Meds:   docusate sodium  100 mg Oral Daily    aspirin  81 mg Oral Daily    atorvastatin  80 mg Oral Daily    busPIRone  7.5 mg Oral TID    carvedilol  6.25 mg Oral BID    apixaban  5 mg Oral BID    febuxostat  40 mg Oral Daily    ferrous sulfate  325 mg Oral BID WC    furosemide  80 mg Oral BID    gabapentin  300 mg Oral BID    insulin glargine  53 Units SubCUTAneous BID    insulin lispro  0-18 Units SubCUTAneous TID WC    insulin lispro  0-9 Units SubCUTAneous Nightly    pantoprazole  40 mg Oral QAM AC    ranolazine  1,000 mg Oral BID    tamsulosin  0.4 mg Oral Daily    traZODone  75 mg Oral Nightly    sodium chloride flush  5-40 mL IntraVENous 2 times per day     Continuous Infusions:   sodium chloride      dextrose       PRN Meds:.sodium chloride flush, sodium chloride flush, sodium chloride, acetaminophen, ondansetron **OR** ondansetron, glucose, dextrose, glucagon (rDNA), dextrose    Physical Exam:    VITALS:  /61   Pulse 58   Temp 97.5 °F (36.4 °C)   Resp 18   Ht 5' 4\" (1.626 m)   Wt 239 lb 13.8 oz (108.8 kg)   SpO2 90%   BMI 41.17 kg/m²   24HR INTAKE/OUTPUT:      Intake/Output Summary (Last 24 hours) at 11/12/2021 1207  Last data filed at 11/11/2021 1745  Gross per 24 hour   Intake 700 ml   Output 1000 ml   Net -300 ml     Constitutional: alert, appears stated age and cooperative    Skin: Skin color, texture, turgor normal. No rashes or lesions    Head: Normocephalic, without obvious abnormality, atraumatic     Cardiovascular/Edema: irregularly irregular rhythm    Respiratory: clear to auscultation bilaterally    Abdomen: soft, non-tender; bowel sounds normal; no masses,  no organomegaly    Back: symmetric, no curvature. ROM normal. No CVA tenderness. Extremities: edema +    Neuro:  Awake but with slurred speech; muscle power 1/5 in right upper and lower extremity. CBC:   Recent Labs     11/10/21  1550   WBC 5.1   HGB 13.0        BMP:    Recent Labs     11/10/21  1550 11/10/21  1559 11/12/21  1003     --  136   K 4.0  --  4.4     --  100   CO2 22  --  23   BUN 16  --  27*   CREATININE 1.42* 1.18 2. 35*   GLUCOSE 347*  --  413*     Lab Results   Component Value Date    NITRU NEGATIVE 09/14/2021    COLORU YELLOW 09/14/2021    PHUR 7.5 09/14/2021    WBCUA 0 TO 2 09/14/2021    RBCUA None 09/14/2021    MUCUS NOT REPORTED 09/14/2021    TRICHOMONAS NOT REPORTED 09/14/2021    YEAST NOT REPORTED 09/14/2021    BACTERIA NOT REPORTED 09/14/2021    SPECGRAV 1.006 09/14/2021    LEUKOCYTESUR TRACE 09/14/2021    UROBILINOGEN Normal 09/14/2021    BILIRUBINUR NEGATIVE 09/14/2021    GLUCOSEU NEGATIVE 09/14/2021    KETUA NEGATIVE 09/14/2021    AMORPHOUS NOT REPORTED 09/14/2021     Urine Creatinine:     Lab Results   Component Value Date    LABCREA 87.7 07/16/2021     IMPRESSION/RECOMMENDATIONS:    1. Acute kidney injury [secondary to acute tubular necrosis and dialysis dependent since August 2021] - will maintain MWF hemodialysis schedule. She does not have any residual renal function and states that she hardly makes any more urine. She is scheduled for AV fistula placement in 2 weeks at [facility] vascular access center with Dr. Benny Solares. Renal diet,i.e 2-gram sodium,2-gram potassium,1500 ml fluid restriction,1-gram phosphorus, 1800 KCal and 1.2 gram protein per day. 2.  Acute on chronic right-sided weakness - rule out recurrent CVA. She may benefit from carotid endarterectomy if possible. We will follow neurology recommendations. 3.  Systemic hypertension - blood pressure control is fair. Continue current medications. 4.  Mineral bone disease profile - Serum phosphorus 3.3 mg/dL is within target range. Prognosis is guarded.     Darrian Romero MD FACP  Attending Nephrologist  11/12/2021 12:07 PM

## 2021-11-13 LAB
CREATININE TIMED UR: ABNORMAL MG/ X H
CREATININE URINE: 78.9 MG/DL
CREATININE, 24H UR: 473 MG/24 H (ref 740–1570)
GLUCOSE BLD-MCNC: 281 MG/DL (ref 65–105)
GLUCOSE BLD-MCNC: 446 MG/DL (ref 65–105)
GLUCOSE BLD-MCNC: 470 MG/DL (ref 65–105)
GLUCOSE BLD-MCNC: 565 MG/DL (ref 70–99)
HOURS COLLECTED: 24 H
HOURS COLLECTED: 24 H
PROTEIN 24 HOUR URINE: 120 MG/24 H
PROTEIN,TOT TIMED UR: NORMAL MG/X H
URINE TOTAL PROTEIN: 20 MG/DL
VOLUME: 600 ML
VOLUME: 600 ML

## 2021-11-13 PROCEDURE — 97116 GAIT TRAINING THERAPY: CPT

## 2021-11-13 PROCEDURE — 2580000003 HC RX 258: Performed by: FAMILY MEDICINE

## 2021-11-13 PROCEDURE — 99232 SBSQ HOSP IP/OBS MODERATE 35: CPT | Performed by: FAMILY MEDICINE

## 2021-11-13 PROCEDURE — 82947 ASSAY GLUCOSE BLOOD QUANT: CPT

## 2021-11-13 PROCEDURE — 97129 THER IVNTJ 1ST 15 MIN: CPT

## 2021-11-13 PROCEDURE — 2060000000 HC ICU INTERMEDIATE R&B

## 2021-11-13 PROCEDURE — 6370000000 HC RX 637 (ALT 250 FOR IP): Performed by: FAMILY MEDICINE

## 2021-11-13 PROCEDURE — 99233 SBSQ HOSP IP/OBS HIGH 50: CPT | Performed by: PSYCHIATRY & NEUROLOGY

## 2021-11-13 PROCEDURE — 97110 THERAPEUTIC EXERCISES: CPT

## 2021-11-13 PROCEDURE — 97130 THER IVNTJ EA ADDL 15 MIN: CPT

## 2021-11-13 PROCEDURE — 36415 COLL VENOUS BLD VENIPUNCTURE: CPT

## 2021-11-13 RX ORDER — INSULIN GLARGINE 100 [IU]/ML
60 INJECTION, SOLUTION SUBCUTANEOUS 2 TIMES DAILY
Status: DISCONTINUED | OUTPATIENT
Start: 2021-11-13 | End: 2021-11-15

## 2021-11-13 RX ADMIN — PANTOPRAZOLE SODIUM 40 MG: 40 TABLET, DELAYED RELEASE ORAL at 05:38

## 2021-11-13 RX ADMIN — SODIUM CHLORIDE, PRESERVATIVE FREE 10 ML: 5 INJECTION INTRAVENOUS at 09:35

## 2021-11-13 RX ADMIN — GABAPENTIN 300 MG: 300 CAPSULE ORAL at 09:29

## 2021-11-13 RX ADMIN — ATORVASTATIN CALCIUM 80 MG: 80 TABLET, FILM COATED ORAL at 09:29

## 2021-11-13 RX ADMIN — RANOLAZINE 1000 MG: 500 TABLET, FILM COATED, EXTENDED RELEASE ORAL at 21:56

## 2021-11-13 RX ADMIN — FEBUXOSTAT 40 MG: 40 TABLET, FILM COATED ORAL at 09:34

## 2021-11-13 RX ADMIN — APIXABAN 5 MG: 5 TABLET, FILM COATED ORAL at 09:29

## 2021-11-13 RX ADMIN — TRAZODONE HYDROCHLORIDE 75 MG: 50 TABLET ORAL at 21:56

## 2021-11-13 RX ADMIN — FUROSEMIDE 80 MG: 40 TABLET ORAL at 09:33

## 2021-11-13 RX ADMIN — BUSPIRONE HYDROCHLORIDE 7.5 MG: 5 TABLET ORAL at 14:05

## 2021-11-13 RX ADMIN — INSULIN LISPRO 18 UNITS: 100 INJECTION, SOLUTION INTRAVENOUS; SUBCUTANEOUS at 17:01

## 2021-11-13 RX ADMIN — GABAPENTIN 300 MG: 300 CAPSULE ORAL at 21:57

## 2021-11-13 RX ADMIN — INSULIN GLARGINE 53 UNITS: 100 INJECTION, SOLUTION SUBCUTANEOUS at 09:30

## 2021-11-13 RX ADMIN — CARVEDILOL 6.25 MG: 6.25 TABLET, FILM COATED ORAL at 09:29

## 2021-11-13 RX ADMIN — INSULIN LISPRO 9 UNITS: 100 INJECTION, SOLUTION INTRAVENOUS; SUBCUTANEOUS at 09:29

## 2021-11-13 RX ADMIN — DOCUSATE SODIUM 100 MG: 100 CAPSULE, LIQUID FILLED ORAL at 09:29

## 2021-11-13 RX ADMIN — SODIUM CHLORIDE, PRESERVATIVE FREE 10 ML: 5 INJECTION INTRAVENOUS at 22:03

## 2021-11-13 RX ADMIN — TAMSULOSIN HYDROCHLORIDE 0.4 MG: 0.4 CAPSULE ORAL at 09:29

## 2021-11-13 RX ADMIN — APIXABAN 5 MG: 5 TABLET, FILM COATED ORAL at 21:57

## 2021-11-13 RX ADMIN — ASPIRIN 81 MG: 81 TABLET, CHEWABLE ORAL at 09:29

## 2021-11-13 RX ADMIN — CARVEDILOL 6.25 MG: 6.25 TABLET, FILM COATED ORAL at 21:56

## 2021-11-13 RX ADMIN — DOCUSATE SODIUM 100 MG: 100 CAPSULE, LIQUID FILLED ORAL at 21:56

## 2021-11-13 RX ADMIN — RANOLAZINE 1000 MG: 500 TABLET, FILM COATED, EXTENDED RELEASE ORAL at 09:29

## 2021-11-13 RX ADMIN — INSULIN LISPRO 18 UNITS: 100 INJECTION, SOLUTION INTRAVENOUS; SUBCUTANEOUS at 11:48

## 2021-11-13 RX ADMIN — FERROUS SULFATE TAB 325 MG (65 MG ELEMENTAL FE) 325 MG: 325 (65 FE) TAB at 09:33

## 2021-11-13 RX ADMIN — INSULIN LISPRO 15 UNITS: 100 INJECTION, SOLUTION INTRAVENOUS; SUBCUTANEOUS at 21:57

## 2021-11-13 RX ADMIN — FUROSEMIDE 80 MG: 40 TABLET ORAL at 17:01

## 2021-11-13 RX ADMIN — INSULIN GLARGINE 60 UNITS: 100 INJECTION, SOLUTION SUBCUTANEOUS at 21:57

## 2021-11-13 RX ADMIN — PSYLLIUM HUSK 1 PACKET: 3.4 POWDER ORAL at 11:48

## 2021-11-13 RX ADMIN — BUSPIRONE HYDROCHLORIDE 7.5 MG: 5 TABLET ORAL at 09:37

## 2021-11-13 RX ADMIN — BUSPIRONE HYDROCHLORIDE 7.5 MG: 5 TABLET ORAL at 21:56

## 2021-11-13 ASSESSMENT — PAIN SCALES - GENERAL: PAINLEVEL_OUTOF10: 0

## 2021-11-13 NOTE — PROGRESS NOTES
Physical Therapy  Facility/Department: [facility]  Daily Treatment Note  NAME: Yvette Spence  : 1960  MRN: [id number]    Date of Service: 2021    Discharge Recommendations:  Patient would benefit from continued therapy after discharge        Assessment   Body structures, Functions, Activity limitations: Decreased functional mobility ; Decreased strength; Decreased cognition; Decreased endurance; Decreased sensation; Decreased balance; Decreased coordination; Decreased posture; Increased pain; Decreased ROM  Treatment Diagnosis: Impaired mobility  Specific instructions for Next Treatment: co-tx with GARCIA/OTR  History: Hemodialysis pt, Recent CVA, Chronic back/neck pain. REQUIRES PT FOLLOW UP: Yes  Activity Tolerance  Activity Tolerance: Patient Tolerated treatment well     Patient Diagnosis(es): The encounter diagnosis was Altered mental status, unspecified altered mental status type. has a past medical history of Backache, unspecified, CHF (congestive heart failure) ([facility] [address] Chronic kidney disease, Coronary atherosclerosis of artery bypass graft, Cramp of limb, Gallstones, Hypertension, Insomnia, Pneumonia, Type II or unspecified type diabetes mellitus with renal manifestations, not stated as uncontrolled(250.40), Type II or unspecified type diabetes mellitus without mention of complication, not stated as uncontrolled, and Unspecified vitamin D deficiency. has a past surgical history that includes Coronary artery bypass graft; Knee arthroscopy; Carpal tunnel release; Breast surgery; Tonsillectomy; Hand surgery; Ankle fracture surgery; Cholecystectomy, open (N/A); and IR TUNNELED CVC PLACE WO SQ PORT/PUMP > 5 YEARS (2021).     Restrictions  Restrictions/Precautions  Restrictions/Precautions: Contact Precautions, Fall Risk  Required Braces or Orthoses?: Yes  Required Braces or Orthoses  Right Lower Extremity Brace:  (Lymphedema wraps)  Left Lower Extremity Brace:  ( Lymphedema wraps)  Position Activity Restriction  Other position/activity restrictions: Hemodialysis MWF  Subjective   General  Chart Reviewed: Yes  Additional Pertinent Hx: This is a 59 y.o. female with a significant past medical history of Chronic atrial fibrillation on Eliquis, Type 2 diabetes mellitus, essential systemic hypertension, coronary artery disease s/p CABG in 2004 and PTCA in 2019, heart failure with preserved ejection fraction LVEF 55% and end-stage renal disease secondary to diabetic and hypertensive nephropathy on routine hemodialysis on a Monday/Wednesday/Friday schedule at [address] dialysis unit on [facility] under the care of Dr. Sarah Dubon since August 2001 using IJ tunneled dialysis catheter, who presented to the hospital for further evaluation of acute on chronic right-sided weakness, Slurred speech and confusion. She was initially diagnosed with acute/subacute stroke in the left frontal lobe with right-sided weakness 4 months ago. Neurology consulted , MRI brain pending. Response To Previous Treatment: Patient with no complaints from previous session. Family / Caregiver Present: No  Referring Practitioner: Dr Red Nowak: Josh Soares is laying supine in bed upon arrival, agreeable to therapy. Patient stating that she cannot feel her R leg and when she ambulates she does not feel it underneath her. Patient also stating that the right leg is painful. Patient also stating tjat she has difficulty moving R UE/LE.    General Comment  Comments: DEDE Jon appproved therapy   Pain Screening  Patient Currently in Pain: Denies  Vital Signs  Patient Currently in Pain: Denies  Oxygen Therapy  O2 Device: None (Room air)       Orientation  Orientation  Overall Orientation Status: Impaired  Objective   Bed mobility  Rolling to Left: Stand by assistance  Rolling to Right: Stand by assistance  Supine to Sit: Stand by assistance  Sit to Supine: Unable to assess  Scooting: Stand by assistance  Transfers  Sit to Stand: Stand by assistance  Stand to sit: Stand by assistance  Bed to Chair: Stand by assistance  Ambulation  Ambulation?: Yes  Ambulation 1  Surface: level tile  Device: Rolling Walker  Assistance: Stand by assistance  Quality of Gait: Slightly unsteady, right LE clearing floor however no heel/toe gait, minimal knee flexion, wide ELISABET, very slow pace  Gait Deviations: Decreased step length; Decreased step height; Slow Annie; Increased ELISABET; Deviated path  Distance: 36ft  Comments: cues for upright posture and to stay inside RW  Stairs/Curb  Stairs?: No        Other exercises  Other exercises?: Yes  Other exercises 1: bed mobility   Other exercises 2: seated B LE exercises x15 reps. Patient utilized gait belt to assist with LAQs on R LE   Other exercises 3: STS x2      Goals  Short term goals  Time Frame for Short term goals: 7 to 8 visits  Short term goal 1: Pt able to perform supine>sit at [address] term goal 2: Pt able to perform sit to supine min A   Short term goal 3: Pt able to perform sit to stand and pivot transfers with rolling walker, min ax 2`  Short term goal 4: Pt able to ambulate with rolling walker distance of 20 ft x 2, min A x 2.   Short term goal 5: Improve R LE strengthto 2 to 2+/5 to improve fucntion  Patient Goals   Patient goals : Can I come to [address] [place] [address] per week: 5 to 7 x/week  Specific instructions for Next Treatment: co-tx with GARCIA/OTR  Current Treatment Recommendations: Strengthening, Balance Training, Functional Mobility Training, Transfer Training, Endurance Training, Gait Training, Home Exercise Program, Safety Education & Training, Patient/Caregiver Education & Training  Safety Devices  Type of devices: Call light within reach, Gait belt, Left in bed        11/13/21 1349   PT Individual Minutes   Time In 1013   Time INQ 5050   Minutes 45     Electronically signed by Lady Loi PTA on 11/13/21 at 1:52 PM EST

## 2021-11-13 NOTE — PLAN OF CARE
Problem: Skin Integrity:  Goal: Will show no infection signs and symptoms  Description: Will show no infection signs and symptoms  Outcome: Ongoing  Note: Skin assessment complete. [employer] applied PRN. Turned and repositioned every two hours. Area kept free from moisture. Proper nourishment and fluids encouraged, as appropriate. Will continue to monitor for additional needs and changes in skin breakdown. Problem: Falls - Risk of:  Goal: Will remain free from falls  Description: Will remain free from falls  Outcome: Ongoing  Note: No falls noted this shift. Patient ambulates with x1 staff assistance without difficulty. Bed kept in low position. Safe environment maintained. Bedside table & call light in reach. Uses call light appropriately when needing assistance. Problem: Pain:  Goal: Pain level will decrease  Description: Pain level will decrease  Outcome: Ongoing  Note: Pt medicated with pain medication prn. Assessed all pain characteristics including level, type, location, frequency, and onset. Non-pharmacologic interventions offered to pt as well. Pt states pain is tolerable at this time. Will continue to monitor.

## 2021-11-13 NOTE — PROGRESS NOTES
Department of Internal Medicine  Nephrology Greer Saenz MD Progress Note    Reason for consultation: Management of end-stage renal disease. Consulting physician: Amanda Palacios MD.    Interval history: Patient feels well today her right-sided weakness is back to baseline. She does not have a headache or shortness of breath. Neurology consultants input is noted. EEG is normal.    History of present illness: This is a 59 y.o. female with a significant past medical history of Chronic atrial fibrillation [on Eliquis], Type 2 diabetes mellitus, essential systemic hypertension, coronary artery disease [s/p CABG in 2004 and PTCA in 2019], heart failure with preserved ejection fraction [LVEF 55%] and end-stage renal disease secondary to diabetic and hypertensive nephropathy [on routine hemodialysis on a Monday/Wednesday/Friday schedule at [address] dialysis unit on [facility] under the care of Dr. Keegan Alanis since August 2001 using IJ tunneled dialysis catheter], who presented to the hospital for further evaluation of acute on chronic right-sided weakness, Slurred speech and confusion. She was initially diagnosed with acute/subacute stroke in the left frontal lobe with right-sided weakness 4 months ago. CT scan of the brain performed at presentation showed no acute finding but CT angiogram of the head and neck showed 70% stenosis in the cavernous/supraclinoid segment of the right internal carotid artery. There was also minimal punctate calcification along the posterior aspect of the right globe.     Scheduled Meds:   insulin glargine  60 Units SubCUTAneous BID    psyllium  1 packet Oral Daily    docusate sodium  100 mg Oral BID    aspirin  81 mg Oral Daily    atorvastatin  80 mg Oral Daily    busPIRone  7.5 mg Oral TID    carvedilol  6.25 mg Oral BID    apixaban  5 mg Oral BID    febuxostat  40 mg Oral Daily    ferrous sulfate  325 mg Oral BID WC    furosemide  80 mg Oral BID    gabapentin  300 mg Oral BID    insulin lispro  0-18 Units SubCUTAneous TID WC    insulin lispro  0-9 Units SubCUTAneous Nightly    pantoprazole  40 mg Oral QAM AC    ranolazine  1,000 mg Oral BID    tamsulosin  0.4 mg Oral Daily    traZODone  75 mg Oral Nightly    sodium chloride flush  5-40 mL IntraVENous 2 times per day     Continuous Infusions:   sodium chloride      dextrose       PRN Meds:.sodium citrate, sodium citrate, anticoagulant sodium citrate, anticoagulant sodium citrate, sodium chloride flush, sodium chloride flush, sodium chloride, acetaminophen, ondansetron **OR** ondansetron, glucose, dextrose, glucagon (rDNA), dextrose    Physical Exam:    VITALS:  BP (!) 145/63   Pulse 66   Temp 97.5 °F (36.4 °C) (Oral)   Resp 18   Ht 5' 4\" (1.626 m)   Wt 243 lb 9.7 oz (110.5 kg)   SpO2 96%   BMI 41.82 kg/m²   24HR INTAKE/OUTPUT:      Intake/Output Summary (Last 24 hours) at 11/13/2021 1159  Last data filed at 11/13/2021 7280  Gross per 24 hour   Intake 1228 ml   Output 2500 ml   Net -1272 ml     Constitutional: alert, appears stated age and cooperative    Skin: Skin color, texture, turgor normal. No rashes or lesions    Head: Normocephalic, without obvious abnormality, atraumatic     Cardiovascular/Edema: irregularly irregular rhythm    Respiratory: clear to auscultation bilaterally    Abdomen: soft, non-tender; bowel sounds normal; no masses,  no organomegaly    Back: symmetric, no curvature. ROM normal. No CVA tenderness. Extremities: edema +    Neuro:  Awake but with slurred speech; muscle power 1/5 in right upper and lower extremity. CBC:   Recent Labs     11/10/21  1550   WBC 5.1   HGB 13.0        BMP:    Recent Labs     11/10/21  1550 11/10/21  1559 11/12/21  1003     --  136   K 4.0  --  4.4     --  100   CO2 22  --  23   BUN 16  --  27*   CREATININE 1.42* 1.18 2. 35*   GLUCOSE 347*  --  413*     Lab Results   Component Value Date    NITRU NEGATIVE 09/14/2021    COLORU YELLOW 09/14/2021    PHUR 7.5 09/14/2021    WBCUA 0 TO 2 09/14/2021    RBCUA None 09/14/2021    MUCUS NOT REPORTED 09/14/2021    TRICHOMONAS NOT REPORTED 09/14/2021    YEAST NOT REPORTED 09/14/2021    BACTERIA NOT REPORTED 09/14/2021    SPECGRAV 1.006 09/14/2021    LEUKOCYTESUR TRACE 09/14/2021    UROBILINOGEN Normal 09/14/2021    BILIRUBINUR NEGATIVE 09/14/2021    GLUCOSEU NEGATIVE 09/14/2021    KETUA NEGATIVE 09/14/2021    AMORPHOUS NOT REPORTED 09/14/2021     Urine Creatinine:     Lab Results   Component Value Date    LABCREA 87.7 07/16/2021     IMPRESSION/RECOMMENDATIONS:    1. Acute kidney injury [secondary to acute tubular necrosis and dialysis dependent since August 2021] - will maintain MWF hemodialysis schedule. She is scheduled for AV fistula placement in 2 weeks at [facility] vascular access center with Dr. Danette Pedraza. Renal diet,i.e 2-gram sodium,2-gram potassium,1500 ml fluid restriction,1-gram phosphorus, 1800 KCal and 1.2 gram protein per day. 2.  Acute on chronic right-sided weakness - rule out recurrent CVA. Will follow neurology recommendations. 3.  Systemic hypertension - blood pressure control is fair. Continue current medications. 4.  Mineral bone disease profile - Serum phosphorus 3.3 mg/dL is within target range. Prognosis is guarded. No renal objection to discharge.     Romeo De La Cruz MD FACP  Attending Nephrologist  11/13/2021 11:59 AM

## 2021-11-13 NOTE — FLOWSHEET NOTE
11/12/21 8941   Provider Notification   Reason for Communication Evaluate   Provider Name Dr. Tova Gao   Provider Notification Physician   Method of Communication Secure Message   Response Waiting for response       Dr. Tova Gao was contacted in regards to patient's inability to move her bowel and would like a stool softener since she usually take some twice daily at home.  Awaiting MD's call back

## 2021-11-13 NOTE — CONSULTS
[facility] ([facility]) Neurology Specialist  [address]  [place], [address] [facility]:  [phone] or [phone]  FAX:  [phone]            Brief history: Jill Harrell is a 59 y.o. old female admitted on 11/10/2021 with amnesia     Subjective: No new neurological events overnight. The patient believes she is unchanged from previous. Denies having any headache.   Continues to have intermittent pain and numbness in the right lower extremity     Objective: /62   Pulse 63   Temp 97.7 °F (36.5 °C) (Oral)   Resp 20   Ht 5' 4\" (1.626 m)   Wt 243 lb 9.7 oz (110.5 kg)   SpO2 95%   BMI 41.82 kg/m²       Medications:    insulin glargine  60 Units SubCUTAneous BID    psyllium  1 packet Oral Daily    docusate sodium  100 mg Oral BID    aspirin  81 mg Oral Daily    atorvastatin  80 mg Oral Daily    busPIRone  7.5 mg Oral TID    carvedilol  6.25 mg Oral BID    apixaban  5 mg Oral BID    febuxostat  40 mg Oral Daily    ferrous sulfate  325 mg Oral BID WC    furosemide  80 mg Oral BID    gabapentin  300 mg Oral BID    insulin lispro  0-18 Units SubCUTAneous TID WC    insulin lispro  0-9 Units SubCUTAneous Nightly    pantoprazole  40 mg Oral QAM AC    ranolazine  1,000 mg Oral BID    tamsulosin  0.4 mg Oral Daily    traZODone  75 mg Oral Nightly    sodium chloride flush  5-40 mL IntraVENous 2 times per day        Neurological examination:    Mental status   Alert and oriented; intact memory with no confusion, speech or language problems; no hallucinations or delusions     Cranial nerves   II - visual fields intact to confrontation                                                III, IV, VI - extra-ocular muscles full: no pupillary defect; no CECELIA, no nystagmus, no ptosis                                                                      V - normal facial sensation                                                               VII - normal facial symmetry VIII - intact hearing                                                                             IX, X - symmetrical palate                                                                  XI - symmetrical shoulder shrug                                                       XII - midline tongue without atrophy or fasciculation     Motor function  Normal muscle bulk and tone; normal power 5/5 in all extremities except right lower extremity, pain giveaway weakness     Sensory function  decrease sensation in the right lower extremity   Cerebellar Intact fine motor movement. No involuntary movements or tremors     Reflex function Intact 2+ DTR and symmetric. Negative Babinski     Gait                  Not tested         Lab Results   Component Value Date    LDLCALC 28 04/09/2015    LDLCHOLESTEROL 72 12/26/2012     No components found for: CHLPL  Lab Results   Component Value Date    TRIG 168 04/09/2015    TRIG 266 02/12/2014    TRIG 218 (H) 12/26/2012     Lab Results   Component Value Date    HDL 43 04/09/2015    HDL 42 02/12/2014    HDL 43 12/26/2012     Lab Results   Component Value Date    LDLCALC 28 04/09/2015    LDLCALC 60 02/12/2014     No results found for: LABVLDL  Lab Results   Component Value Date    LABA1C 8.9 (H) 03/15/2021     Lab Results   Component Value Date     03/15/2021     Lab Results   Component Value Date    DUSWTROO76 423 12/26/2012      Neurological work up:  CT head unremarkable   CTA head and neck 70% supraclinoid right ICA stenosis  MRI brain 9/13/2021 normal  2 D echo     Assessment and Recommendations:   Patient with a history of possible stroke in August 2021 and recurrent episodes of amnesia   70% supraclinoid right ICA stenosis, likely clinically asymptomatic    Subjective amnesia:  I had extensive discussion with patient and patient's family at bedside and reviewed her neurological work-up thus far.   I believe, most, if not all of her symptoms are secondary to underlying stress  Await psych consult    Right-sided numbness and tingling:  Exam is somewhat inconsistent. The patient continues to have right-sided numbness at baseline. I recommend obtain MRI scan cervical spine. Patient's previous MRI of the cervical spine January 2020 showed multilevel degenerative changes of cervical spine, severe C5-C6 severe spinal canal stenosis. Subsequently in February 2020, she underwent C5-C7 corpectomy C6 anterior cervical fusion    Right ICA supraclinoid cerebral stenosis:  Clinically symptomatic. Recommend continued risk factor stratification as being done with continued control of diabetes, hypertension and hyperlipidemia. The patient is currently on aspirin 81 mg a day, Eliquis 5 mg twice daily and Lipitor 80 mg p.o. nightly. Recommend to continue same. We will follow. Sadaf Sadler MD  Neurology    This note is created with the assistance of a speech-recognition program. While intending to generate a document that actually reflects the content of the visit, the document can still have some errors including those of syntax and sound a- like substitutions which may escape proofreading. In such instances, actual meaning can be extrapolated by contextual derivation.

## 2021-11-13 NOTE — PROGRESS NOTES
Speech Language Pathology  Speech Language Pathology  [address] [place] [address]     Cognitive Treatment Note    Date: 11/13/2021  Patients Name: Vanessa Huang  MRN: [id number]  Diagnosis: Cognitive impairment  Patient Active Problem List   Diagnosis Code    Atherosclerosis of coronary artery bypass graft of native heart without angina pectoris I25.810    Acute renal failure ([facility] N17.9    Diabetes mellitus due to underlying condition with diabetic nephropathy, with long-term current use of insulin ([facility]) E08.21, Z79.4    Chronic diastolic heart failure ([facility] I50.32    Diabetic polyneuropathy associated with type 2 diabetes mellitus ([facility]) E11.42    History of coronary artery bypass graft Z95.1    Iron deficiency anemia D50.9    Spinal stenosis of lumbar region with neurogenic claudication M48.062    Mixed hyperlipidemia E78.2    Stage 4 chronic kidney disease ([facility]) N18.4    Type 2 diabetes mellitus with kidney complication, with long-term current use of insulin ([facility]) E11.29, Z79.4    Syncope and collapse R55    Obesity, Class II, BMI 35-39.9 E66.9    Thyroid nodule greater than or equal to 1 cm in diameter incidentally noted on imaging study E04.1    Essential hypertension I10    Anemia in stage 4 chronic kidney disease ([facility]) N18.4, D63.1    Chronic ischemic heart disease I25.9    Ischemic stroke of frontal lobe ([facility]) I63.9    Stroke-like symptoms R29.90    Morbid obesity with BMI of 40.0-44.9, adult ([facility]) E66.01, Z68.41    Acute encephalopathy G93.40    Disequilibrium syndrome E87.8    Debility R53.81    Long-term memory loss R41.3    Muscle right arm weakness M62.81    Anxiety F41.9    Chronic midline low back pain with bilateral sciatica M54.41, M54.42, G89.29    Need for immunization against influenza Z23    Altered mental status R41.82       Pain: no     Cognitive Treatment    Treatment time: 0296-4401      Subjective: [x] Alert [x] Cooperative     [] Confused     [] Agitated    [] Lethargic      Objective/Assessment:  Attention: sustained throughout session, distractions in room minimized    Orientation: Oriented to place, JULIAN, month (pt stated 11th, given extra time able to recall November), year (given extra time, Pt. able to recall choice options provided by SLP yesterday and correct response), and president. Pt. Stating, \"I remember all that because I wrote it down yesterday. Recall: Long term recall-     Providing directions from hospital to Pt's own home completed with min-mod A. Pt. Initially stating, \"I have no idea\", given encouragement and cue to identify landmarks, Pt. Able to recall various stores/restaurants on way to home. Pt. Cristhian Beltranlow to recall own address, but able to recall street name. Identifying occupations of family members- Completed with min-mod A. Pt. Demonstrating difficulty recalling job titles, but cued to describe job responsibilities to assist in recall. Other: Pt reports new onset of spelling difficulty when writing in memory book and reading difficulty when reading information on TV, handouts, etc.  Discussed long term memory involvement in mental orthographic representations associated with reading/writing. Education provided re: tasks to facilitate reading/spelling abilities (e.g., review sight words, word searches). Pt. Verbalized understanding and agreeable. Reading single words: (3-8 letters)- 90% accuracy, additional time provided to sound out words. Pt. Recognizing letter combinations within words and using this knowledge to assist in reading. Pt. relying more on decoding phonemes rather than sight recognition throughout. Reading sentences: (3-7 words)- 80% accuracy anthony, additional time provided to sound out and combine words. Continued pt ed provided throughout session re use of compensatory memory aides to facilitate recall of functional info.  Pt encouraged to utilize her \"memory book\" on bedside table and to write down functional information/new information learned throughout the day. Pt verbalized understanding of all recommendations. Plan:  [x] Continue ST services    [] Discharge from ST:      Discharge recommendations: []  Further therapy recommended at discharge. The patient should be able to tolerate at least 3 hours of therapy per day over 5 days or 15 hours over 7 days. [] Further therapy recommended at discharge. [] No therapy recommended at discharge. Treatment completed by:  Gildardo Bass, BRAYAN, M.A., CCC-SLP

## 2021-11-13 NOTE — PROGRESS NOTES
Sent perfect serve to Psych regarding consult.   Dr. Dell Russell will see patient face to face tomorrow,

## 2021-11-13 NOTE — PLAN OF CARE
Problem: Skin Integrity:  Goal: Will show no infection signs and symptoms  Description: Will show no infection signs and symptoms  Outcome: Ongoing  Goal: Absence of new skin breakdown  Description: Absence of new skin breakdown  Outcome: Ongoing  Note: Pt resting comfortable in bed at this time. No distress noted. Assessment remains as charted. Keep skin clean and dry. Assist pt with repositioning q2h and prn      Problem: Falls - Risk of:  Goal: Will remain free from falls  Description: Will remain free from falls  Outcome: Ongoing  Goal: Absence of physical injury  Description: Absence of physical injury  Outcome: Ongoing  Note: Pt remains free from falls this shift. Bed in low position, call light in reach, side rails up x 2, Cont to monior closely      Problem: HEMODYNAMIC STATUS  Goal: Patient has stable vital signs and fluid balance  Outcome: Ongoing  Note: No change in stroke scale     Problem: ACTIVITY INTOLERANCE/IMPAIRED MOBILITY  Goal: Mobility/activity is maintained at optimum level for patient  Outcome: Ongoing     Problem: COMMUNICATION IMPAIRMENT  Goal: Ability to express needs and understand communication  Outcome: Ongoing     Problem: Pain:  Goal: Pain level will decrease  Description: Pain level will decrease  Outcome: Ongoing  Note: See MAR, Give meds as needed  Assist pt with repositioning for comfort.   Cont to monitor closely   Goal: Control of acute pain  Description: Control of acute pain  Outcome: Ongoing  Goal: Control of chronic pain  Description: Control of chronic pain  Outcome: Ongoing

## 2021-11-13 NOTE — PLAN OF CARE
Problem: Pain:  Goal: Pain level will decrease  Description: Pain level will decrease  Outcome: Ongoing  Note: Pt medicated with pain medication prn. Assessed all pain characteristics including level, type, location, frequency, and onset. Non-pharmacologic interventions offered to pt as well. Pt states pain is tolerable at this time. Will continue to monitor. Problem: Pain:  Goal: Control of acute pain  Description: Control of acute pain  Outcome: Ongoing     Problem: Pain:  Goal: Pain level will decrease  Description: Pain level will decrease  Outcome: Ongoing  Note: Pt medicated with pain medication prn. Assessed all pain characteristics including level, type, location, frequency, and onset. Non-pharmacologic interventions offered to pt as well. Pt states pain is tolerable at this time. Will continue to monitor.

## 2021-11-13 NOTE — PROGRESS NOTES
Progress Note  Date:2021       Room:[address] [place] [address]  Patient Jana Titus     YOB: 1960     Age:61 y.o. Subjective    Subjective:  Symptoms:  Stable. (Constipation continues - patient states she also uses fiber supplement at home and would like it ordered here. ). Diet:  Adequate intake. Activity level: Impaired due to weakness. Pain:  She reports no pain. Review of Systems  Objective         Vitals Last 24 Hours:  TEMPERATURE:  Temp  Av.8 °F (36.6 °C)  Min: 97.5 °F (36.4 °C)  Max: 98.3 °F (36.8 °C)  RESPIRATIONS RANGE: Resp  Av  Min: 16  Max: 18  PULSE OXIMETRY RANGE: SpO2  Av.3 %  Min: 92 %  Max: 96 %  PULSE RANGE: Pulse  Av.7  Min: 56  Max: 66  BLOOD PRESSURE RANGE: Systolic (25HEV), CQL:867 , Min:114 , MJZ:216   ; Diastolic (70YGS), OQB:79, Min:50, Max:86    I/O (24Hr): Intake/Output Summary (Last 24 hours) at 2021 1051  Last data filed at 2021 0824  Gross per 24 hour   Intake 1228 ml   Output 2500 ml   Net -1272 ml     Objective:  General Appearance:  Comfortable. Vital signs: (most recent): Blood pressure (!) 145/63, pulse 66, temperature 97.5 °F (36.4 °C), temperature source Oral, resp. rate 18, height 5' 4\" (1.626 m), weight 243 lb 9.7 oz (110.5 kg), SpO2 96 %. Vital signs are normal.    Lungs:  Normal effort and normal respiratory rate. Breath sounds clear to auscultation. Heart: Normal rate.   S1 normal and S2 normal.      Labs/Imaging/Diagnostics    Labs:  CBC:  Recent Labs     11/10/21  1550   WBC 5.1   RBC 3.90*   HGB 13.0   HCT 37.0   MCV 94.8   RDW 15.3*        CHEMISTRIES:  Recent Labs     11/10/21  1550 11/10/21  1559 21  1209 21  1003     --   --  136   K 4.0  --   --  4.4     --   --  100   CO2 22  --   --  23   BUN 16  --   --  27*   CREATININE 1.42* 1.18  --  2.35*   GLUCOSE 347*  --   --  413*   PHOS  --   --  3.3  --    MG 1.9  --   --   --      PT/INR:  Recent Labs 11/10/21  1550   PROTIME 12.7   INR 0.9     APTT:  Recent Labs     11/10/21  1550   APTT 27.5     LIVER PROFILE:No results for input(s): AST, ALT, BILIDIR, BILITOT, ALKPHOS in the last 72 hours. Imaging Last 24 Hours:  MRI BRAIN WO CONTRAST    Result Date: 11/11/2021  EXAMINATION: MRI OF THE BRAIN WITHOUT CONTRAST  11/11/2021 6:38 pm TECHNIQUE: Multiplanar multisequence MRI of the brain was performed without the administration of intravenous contrast. COMPARISON: 09/13/2021 HISTORY: ORDERING SYSTEM PROVIDED HISTORY: stroke TECHNOLOGIST PROVIDED HISTORY: stroke Reason for Exam: stroke like symptoms FINDINGS: There is no acute infarction, intracranial hemorrhage, or intracranial mass lesion. No mass effect, midline shift, or extra-axial collection is noted. There are mild nonspecific foci of periventricular and subcortical cerebral white matter T2/FLAIR hyperintensity, most likely representing chronic microangiopathic disease in this age group. Chronic encephalomalacia involving the left occipital lobe. The brain parenchyma is otherwise normal. The pituitary gland is normal in appearance. The cerebellar tonsils are in normal position. The ventricles, sulci, and cisterns are prominent suggestive of generalized volume loss. The intracranial flow voids are preserved. The globes and orbits are within normal limits. The visualized extracranial structures including paranasal sinuses and mastoid air cells are unremarkable. Stable study. There is no acute infarction, intracranial hemorrhage, or intracranial mass lesion. Mild chronic microangiopathic ischemic disease. Mild generalized volume loss. Chronic encephalomalacia involving the left occipital lobe.      Assessment//Plan           Hospital Problems           Last Modified POA    * (Principal) Stroke-like symptoms 11/11/2021 Yes    Chronic diastolic heart failure ([address] 11/10/2021 Yes    Mixed hyperlipidemia 11/10/2021 Yes    Type 2 diabetes mellitus with kidney complication, with long-term current use of insulin ([facility] 11/10/2021 Yes    Essential hypertension 11/10/2021 Yes    Anemia in stage 4 chronic kidney disease ([facility] 11/10/2021 Yes    Morbid obesity with BMI of 40.0-44.9, adult ([facility] 11/10/2021 Yes    Disequilibrium syndrome 11/10/2021 Yes    Anxiety 11/10/2021 Yes    Altered mental status 11/11/2021 Yes        Assessment & Plan  Appropriate for [facility] - await insurance precertification    Electronically signed by Adan Marks MD on 11/13/21 at 10:51 AM EST

## 2021-11-14 LAB
-: ABNORMAL
AMORPHOUS: ABNORMAL
BACTERIA: ABNORMAL
BILIRUBIN URINE: NEGATIVE
CASTS UA: ABNORMAL /LPF
CASTS UA: ABNORMAL /LPF
CHLORIDE, UR: 26 MMOL/L
COLOR: YELLOW
COMMENT UA: ABNORMAL
CREATININE URINE: 72 MG/DL (ref 28–217)
CRYSTALS, UA: ABNORMAL /HPF
EPITHELIAL CELLS UA: ABNORMAL /HPF
GLUCOSE BLD-MCNC: 338 MG/DL (ref 65–105)
GLUCOSE BLD-MCNC: 346 MG/DL (ref 65–105)
GLUCOSE BLD-MCNC: 372 MG/DL (ref 65–105)
GLUCOSE BLD-MCNC: 401 MG/DL (ref 65–105)
GLUCOSE BLD-MCNC: 528 MG/DL (ref 65–105)
GLUCOSE URINE: ABNORMAL
KETONES, URINE: NEGATIVE
LEUKOCYTE ESTERASE, URINE: ABNORMAL
MUCUS: ABNORMAL
NITRITE, URINE: NEGATIVE
OTHER OBSERVATIONS UA: ABNORMAL
PH UA: 5 (ref 5–8)
PROTEIN UA: NEGATIVE
RBC UA: ABNORMAL /HPF
RENAL EPITHELIAL, UA: ABNORMAL /HPF
SODIUM,UR: 47 MMOL/L
SPECIFIC GRAVITY UA: 1.01 (ref 1–1.03)
TOTAL PROTEIN, URINE: 14 MG/DL
TRICHOMONAS: ABNORMAL
TURBIDITY: ABNORMAL
URINE HGB: NEGATIVE
URINE TOTAL PROTEIN CREATININE RATIO: 0.19 (ref 0–0.2)
UROBILINOGEN, URINE: NORMAL
WBC UA: ABNORMAL /HPF
YEAST: ABNORMAL

## 2021-11-14 PROCEDURE — 97530 THERAPEUTIC ACTIVITIES: CPT

## 2021-11-14 PROCEDURE — 2060000000 HC ICU INTERMEDIATE R&B

## 2021-11-14 PROCEDURE — 84156 ASSAY OF PROTEIN URINE: CPT

## 2021-11-14 PROCEDURE — 82436 ASSAY OF URINE CHLORIDE: CPT

## 2021-11-14 PROCEDURE — 2580000003 HC RX 258: Performed by: FAMILY MEDICINE

## 2021-11-14 PROCEDURE — 6370000000 HC RX 637 (ALT 250 FOR IP): Performed by: FAMILY MEDICINE

## 2021-11-14 PROCEDURE — 81001 URINALYSIS AUTO W/SCOPE: CPT

## 2021-11-14 PROCEDURE — 97130 THER IVNTJ EA ADDL 15 MIN: CPT

## 2021-11-14 PROCEDURE — 99232 SBSQ HOSP IP/OBS MODERATE 35: CPT | Performed by: FAMILY MEDICINE

## 2021-11-14 PROCEDURE — 97110 THERAPEUTIC EXERCISES: CPT

## 2021-11-14 PROCEDURE — 97129 THER IVNTJ 1ST 15 MIN: CPT

## 2021-11-14 PROCEDURE — 99253 IP/OBS CNSLTJ NEW/EST LOW 45: CPT | Performed by: NURSE PRACTITIONER

## 2021-11-14 PROCEDURE — 82570 ASSAY OF URINE CREATININE: CPT

## 2021-11-14 PROCEDURE — 84300 ASSAY OF URINE SODIUM: CPT

## 2021-11-14 PROCEDURE — 99232 SBSQ HOSP IP/OBS MODERATE 35: CPT | Performed by: PSYCHIATRY & NEUROLOGY

## 2021-11-14 PROCEDURE — 97116 GAIT TRAINING THERAPY: CPT

## 2021-11-14 RX ADMIN — RANOLAZINE 1000 MG: 500 TABLET, FILM COATED, EXTENDED RELEASE ORAL at 21:04

## 2021-11-14 RX ADMIN — CARVEDILOL 6.25 MG: 6.25 TABLET, FILM COATED ORAL at 07:54

## 2021-11-14 RX ADMIN — INSULIN LISPRO 7 UNITS: 100 INJECTION, SOLUTION INTRAVENOUS; SUBCUTANEOUS at 20:52

## 2021-11-14 RX ADMIN — BUSPIRONE HYDROCHLORIDE 7.5 MG: 5 TABLET ORAL at 07:53

## 2021-11-14 RX ADMIN — APIXABAN 5 MG: 5 TABLET, FILM COATED ORAL at 07:54

## 2021-11-14 RX ADMIN — APIXABAN 5 MG: 5 TABLET, FILM COATED ORAL at 20:55

## 2021-11-14 RX ADMIN — GABAPENTIN 300 MG: 300 CAPSULE ORAL at 07:54

## 2021-11-14 RX ADMIN — PANTOPRAZOLE SODIUM 40 MG: 40 TABLET, DELAYED RELEASE ORAL at 06:19

## 2021-11-14 RX ADMIN — DOCUSATE SODIUM 100 MG: 100 CAPSULE, LIQUID FILLED ORAL at 07:54

## 2021-11-14 RX ADMIN — TRAZODONE HYDROCHLORIDE 75 MG: 50 TABLET ORAL at 20:56

## 2021-11-14 RX ADMIN — GABAPENTIN 300 MG: 300 CAPSULE ORAL at 20:55

## 2021-11-14 RX ADMIN — INSULIN LISPRO 12 UNITS: 100 INJECTION, SOLUTION INTRAVENOUS; SUBCUTANEOUS at 07:55

## 2021-11-14 RX ADMIN — BUSPIRONE HYDROCHLORIDE 7.5 MG: 5 TABLET ORAL at 20:56

## 2021-11-14 RX ADMIN — INSULIN GLARGINE 60 UNITS: 100 INJECTION, SOLUTION SUBCUTANEOUS at 07:56

## 2021-11-14 RX ADMIN — ASPIRIN 81 MG: 81 TABLET, CHEWABLE ORAL at 07:54

## 2021-11-14 RX ADMIN — FEBUXOSTAT 40 MG: 40 TABLET, FILM COATED ORAL at 07:58

## 2021-11-14 RX ADMIN — RANOLAZINE 1000 MG: 500 TABLET, FILM COATED, EXTENDED RELEASE ORAL at 07:54

## 2021-11-14 RX ADMIN — CARVEDILOL 6.25 MG: 6.25 TABLET, FILM COATED ORAL at 20:56

## 2021-11-14 RX ADMIN — INSULIN LISPRO 15 UNITS: 100 INJECTION, SOLUTION INTRAVENOUS; SUBCUTANEOUS at 01:03

## 2021-11-14 RX ADMIN — FUROSEMIDE 80 MG: 40 TABLET ORAL at 16:27

## 2021-11-14 RX ADMIN — INSULIN LISPRO 18 UNITS: 100 INJECTION, SOLUTION INTRAVENOUS; SUBCUTANEOUS at 11:38

## 2021-11-14 RX ADMIN — PSYLLIUM HUSK 1 PACKET: 3.4 POWDER ORAL at 07:55

## 2021-11-14 RX ADMIN — BUSPIRONE HYDROCHLORIDE 7.5 MG: 5 TABLET ORAL at 15:21

## 2021-11-14 RX ADMIN — TAMSULOSIN HYDROCHLORIDE 0.4 MG: 0.4 CAPSULE ORAL at 07:54

## 2021-11-14 RX ADMIN — SODIUM CHLORIDE, PRESERVATIVE FREE 10 ML: 5 INJECTION INTRAVENOUS at 21:04

## 2021-11-14 RX ADMIN — FUROSEMIDE 80 MG: 40 TABLET ORAL at 07:54

## 2021-11-14 RX ADMIN — DOCUSATE SODIUM 100 MG: 100 CAPSULE, LIQUID FILLED ORAL at 20:55

## 2021-11-14 RX ADMIN — INSULIN GLARGINE 60 UNITS: 100 INJECTION, SOLUTION SUBCUTANEOUS at 20:53

## 2021-11-14 RX ADMIN — ATORVASTATIN CALCIUM 80 MG: 80 TABLET, FILM COATED ORAL at 07:54

## 2021-11-14 RX ADMIN — SODIUM CHLORIDE, PRESERVATIVE FREE 10 ML: 5 INJECTION INTRAVENOUS at 07:58

## 2021-11-14 RX ADMIN — INSULIN LISPRO 12 UNITS: 100 INJECTION, SOLUTION INTRAVENOUS; SUBCUTANEOUS at 16:28

## 2021-11-14 ASSESSMENT — PAIN SCALES - GENERAL: PAINLEVEL_OUTOF10: 0

## 2021-11-14 NOTE — CONSULTS
Department of Psychiatry   Psychiatric consult assessment        Thank you very much for allowing us to participate in the care of this patient. Reason for Consult:  evaluation memory problems secondary to stress    HISTORY OF PRESENT ILLNESS:          The patient is a 59 y.o. female with significant history of CHF, end-stage renal disease, CABG x3, hypertension, DM 2, atrial fib and anxiety who is admitted medically for altered mental status. She has been evaluated by neurology in addition to family medicine with the plan to discharge to an acute rehab unit once hyperglycemia stabilizes. Patient was evaluated today bedside for memory problems over the past few months. Angela Brambila reports that approximately 3 months ago she experienced a stroke while at a baby shower and reports that she has had difficulty with recognizing people and remembering autobiographical information since experiencing the stroke. She reports that she has had increased anxiety and was placed on BuSpar approximately 1 month ago by her primary care physician. She reports that she is experiencing panic attacks once monthly or less and that by distracting herself through watching television or crying she has been able to calm herself down. She denies any history of depression, she denies any current symptoms of depression or suicidal ideation. She denies any historical or current symptoms of chance or psychosis. She has no history of OCD, PTSD though does report in addition to anxiety and panic she has social phobia and does not like to be among crowds. She is concerned today because she is having difficulty staying awake throughout our interview. She is sitting up bedside in the chair and agreeable to engaging in conversation though she does require occasional prompting to wake up to remain engaged.   She reports that she is trying to \"study\" the memory cards that she was given by neurology as well as journal so that she can remember the problems that she has been having in recent months. She reports that she had 2 separate incidents where she had difficulty remembering her sister. She states \"I did not recognize her at all\". She also reports that she is having trouble remembering \"memories and experiences that I had with my sister\". Staff reports that she at times has subjective reports of inability to use upper and lower extremities though is able to engage with physical therapy. There is concern that the memory loss is secondary to stress and not an underlying neurological problem. We explored whether the buspirone was beneficial for her anxiety once starting it. She reports that she did not notice a significant improvement though states that it was recently titrated by her primary care physician. She denies ever taking any other type of psychotropic medication for mood or sleep. She reports that prior to her stroke she was not experiencing any memory problems and denied any significant forgetfulness. She does endorse a history of dementia within her family, she reports that her father is currently living in an ECF and states that he started to show signs of dementia in his [de-identified]. She reports that her maternal grandmother did live with them when she was younger, she states \"I do not think she passed away from dementia but she may have had it\". Cheri denies any history of alcohol, nicotine or illicit drug use. She was cooperative and agreed to participate today in a slums evaluation. At times she did fall asleep through the evaluation, though this author had to do little prompting to wake her, she woke on her own and understanding that she nodded off. She did score a 21 on this evaluation and had difficulty with immediate and delayed recall, numeric calculation and some trouble with executive function plus extrapolation.   Based on her current medication regimen, she could benefit from further titration of her BuSpar, and with mouth daily 9/25/21  Yes Deisi New MD   pantoprazole (PROTONIX) 40 MG tablet Take 1 tablet by mouth every morning (before breakfast) 9/25/21  Yes Deisi New MD   insulin lispro, 1 Unit Dial, (HUMALOG KWIKPEN) 100 UNIT/ML SOPN Per sliding scale:  If <139  No Insulin; 140-199  2 Units; 200-249  4 Units; 250-299  6 Units; 300-349  8 Units; 350-400  10 Units; Above 400  12 Units 9/25/21  Yes Deisi New MD   blood glucose test strips (DEN CONTOUR TEST) strip 1 each by In Vitro route 3 times daily 8/25/21  Yes Deisi New MD   ferrous sulfate (BRIAN-ARCHIE) 325 (65 Fe) MG tablet Take 1 tablet by mouth 2 times daily (with meals) 8/25/21  Yes Deisi New MD   senna (SENOKOT) 8.6 MG tablet Take 2 tablets by mouth daily as needed (Constipation) 8/25/21  Yes Deisi New MD   tamsulosin (FLOMAX) 0.4 MG capsule Take 1 capsule by mouth daily 11/10/21   AFSANEH Terry CNP   Insulin Pen Needle (BD ULTRA-FINE PEN NEEDLES) 29G X 12.7MM MISC Use one needle for each insulin injection. 9/25/21   Deisi New MD   acetaminophen (TYLENOL) 325 MG tablet Take 2 tablets by mouth every 4 hours as needed for Pain 8/25/21   Deisi New MD   Alcohol Swabs 70 % PADS Use an alcohol swab to cleanse the skin before checking your blood sugar and before each insulin injection.  8/25/21   Deisi New MD   sharps container 1 each by Does not apply route as needed (dirty pen needles) 4/19/21   AFSANEH Terry CNP   allopurinol (ZYLOPRIM) 100 MG tablet Take 1 tablet by mouth daily 4/14/21   AFSANEH Terry CNP        Medications:    Current Facility-Administered Medications: insulin glargine (LANTUS) injection vial 60 Units, 60 Units, SubCUTAneous, BID  psyllium (METAMUCIL) 58.12 % packet 1 packet, 1 packet, Oral, Daily  sodium citrate 4 % injection 1.9 mL, 1.9 mL, IntraCATHeter, PRN  sodium citrate 4 % injection 1.9 mL, 1.9 mL, IntraCATHeter, PRN  anticoagulant sodium citrate 4 % injection 1.9 atherosclerosis of artery bypass graft     Cramp of limb     Gallstones     Hypertension     Insomnia     Pneumonia     Type II or unspecified type diabetes mellitus with renal manifestations, not stated as uncontrolled(250.40)     Type II or unspecified type diabetes mellitus without mention of complication, not stated as uncontrolled     Unspecified vitamin D deficiency        Past Surgical History:        Procedure Laterality Date    ANKLE FRACTURE SURGERY      BREAST SURGERY      CARPAL TUNNEL RELEASE      CHOLECYSTECTOMY, OPEN N/A     CORONARY ARTERY BYPASS GRAFT      x3    HAND SURGERY      IR TUNNELED CATHETER PLACEMENT GREATER THAN 5 YEARS  8/18/2021    IR TUNNELED CATHETER PLACEMENT GREATER THAN 5 YEARS 8/18/2021 [facility]    KNEE ARTHROSCOPY      right    TONSILLECTOMY         Allergies: Adhesive tape, Ace inhibitors, Iv dye [iodides], and Metformin and related      Social History:      RESIDENCE: Stable housing, resides with mother and serves as her caretaker  : Single, never     CHILDREN: None  OCCUPATION: Retired, 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 Family Medical and Psychiatric History:     Denies any significant family history of mental illness. Denies any substance abuse within the family. Reports that her father currently suffers from dementia, reports that her maternal grandmother possibly also suffered from dementia.         Problem Relation Age of Onset    Diabetes Father     Heart Failure Father          Physical  BP (!) 134/59   Pulse 61   Temp 97.5 °F (36.4 °C) (Oral)   Resp 16   Ht 5' 4\" (1.626 m)   Wt 248 lb 14.4 oz (112.9 kg)   SpO2 97%   BMI 42.72 kg/m²         Mental Status Examination:  Level of consciousness: Groggy, easily arousable to verbal stimulus  Appearance: hospital attire, sitting up in bedside chair, fair grooming  Behavior/Motor: Frequently nods off to sleep during interview  Attitude toward examiner: Mostly cooperative, somewhat attentive and intermittent eye contact  Speech: At times speech is latent and slow, when awake normal rate, volume and tone  Mood: \"Worried\"  Affect: mood congruent  Thought processes:  Linear, goal directed, coherent  Thought content: Denies suicidal ideations   Denies homicidal ideations    Denies hallucinations   Denies delusions  Cognition:  Oriented to self, situation, location, somewhat to date, she had difficulty with the year  Concentration: Impaired, she had difficulty with immediate and delayed recall as well as number calculation and registration  Memory age: Impaired  Insight & Judgment:  fair    DSM-5 DIAGNOSIS:      Adjustment disorder with anxiety  Mild neurocognitive disorder      Stressors     Severity of stressors is moderate  Source of stressors include: Other psychosocial and environmental stressors    PLAN:      Recommend further titration of BuSpar to 10 mg 3 times daily for anxiety  Consider addition of acetylcholinesterase inhibitor  Additional recommendations will follow the clinical course. Thank you very much for allowing us to participate in the care of this patient. Time spent 60 min.       Electronically signed by AFSANEH Alfred CNP on 11/14/21 at 1:22 PM EST

## 2021-11-14 NOTE — FLOWSHEET NOTE
11/1960   Provider Notification   Reason for Communication Evaluate   Provider Name Dr. Julieth Hernandez   Provider Notification Physician   Method of Communication Secure Message   Response Waiting for response   Notification Time 1960       MD was contacted via secure message because Patient's Blood glucose for tonight is too high for the glucometer to read.  Waiting on MD's responds

## 2021-11-14 NOTE — PROGRESS NOTES
Department of Internal Medicine  Nephrology Bev Melgar MD Progress Note    Reason for consultation: Management of end-stage renal disease. Consulting physician: Ariana Norris MD.    Interval history: Patient was seen and examined today and she has had elevated blood glucose averaging 300 to 500 mg/dL and insulin is being adjusted. Right-sided weakness is back to baseline and she does not have a headache or dizziness. EEG is normal.    History of present illness: This is a 59 y.o. female with a significant past medical history of Chronic atrial fibrillation [on Eliquis], Type 2 diabetes mellitus, essential systemic hypertension, coronary artery disease [s/p CABG in 2004 and PTCA in 2019], heart failure with preserved ejection fraction [LVEF 55%] and end-stage renal disease secondary to diabetic and hypertensive nephropathy [on routine hemodialysis on a Monday/Wednesday/Friday schedule at [address] dialysis unit on [facility] under the care of Dr. Amelia Fuentes since August 2001 using IJ tunneled dialysis catheter], who presented to the hospital for further evaluation of acute on chronic right-sided weakness, Slurred speech and confusion. She was initially diagnosed with acute/subacute stroke in the left frontal lobe with right-sided weakness 4 months ago. CT scan of the brain performed at presentation showed no acute finding but CT angiogram of the head and neck showed 70% stenosis in the cavernous/supraclinoid segment of the right internal carotid artery. There was also minimal punctate calcification along the posterior aspect of the right globe.     Scheduled Meds:   insulin glargine  60 Units SubCUTAneous BID    psyllium  1 packet Oral Daily    docusate sodium  100 mg Oral BID    aspirin  81 mg Oral Daily    atorvastatin  80 mg Oral Daily    busPIRone  7.5 mg Oral TID    carvedilol  6.25 mg Oral BID    apixaban  5 mg Oral BID    febuxostat  40 mg Oral Daily    ferrous sulfate  325 mg Oral BID WC    furosemide  80 mg Oral BID    gabapentin  300 mg Oral BID    insulin lispro  0-18 Units SubCUTAneous TID WC    insulin lispro  0-9 Units SubCUTAneous Nightly    pantoprazole  40 mg Oral QAM AC    ranolazine  1,000 mg Oral BID    tamsulosin  0.4 mg Oral Daily    traZODone  75 mg Oral Nightly    sodium chloride flush  5-40 mL IntraVENous 2 times per day     Continuous Infusions:   sodium chloride      dextrose       PRN Meds:.sodium citrate, sodium citrate, anticoagulant sodium citrate, anticoagulant sodium citrate, sodium chloride flush, sodium chloride flush, sodium chloride, acetaminophen, ondansetron **OR** ondansetron, glucose, dextrose, glucagon (rDNA), dextrose    Physical Exam:    VITALS:  BP (!) 140/62   Pulse 68   Temp 97.6 °F (36.4 °C) (Oral)   Resp 18   Ht 5' 4\" (1.626 m)   Wt 248 lb 14.4 oz (112.9 kg)   SpO2 90%   BMI 42.72 kg/m²   24HR INTAKE/OUTPUT:      Intake/Output Summary (Last 24 hours) at 11/14/2021 1044  Last data filed at 11/14/2021 0859  Gross per 24 hour   Intake 2246 ml   Output 1850 ml   Net 396 ml     Constitutional: alert, appears stated age and cooperative    Skin: Skin color, texture, turgor normal. No rashes or lesions    Head: Normocephalic, without obvious abnormality, atraumatic     Cardiovascular/Edema: irregularly irregular rhythm    Respiratory: clear to auscultation bilaterally    Abdomen: soft, non-tender; bowel sounds normal; no masses,  no organomegaly    Back: symmetric, no curvature. ROM normal. No CVA tenderness. Extremities: edema +    Neuro:  Awake but with slurred speech; muscle power 1/5 in right upper and lower extremity. CBC:   No results for input(s): WBC, HGB, PLT in the last 72 hours.   BMP:    Recent Labs     11/12/21  1003 11/13/21 2113     --    K 4.4  --      --    CO2 23  --    BUN 27*  --    CREATININE 2.35*  --    GLUCOSE 413* 565*     Lab Results   Component Value Date    NITRU NEGATIVE 11/14/2021    COLORU

## 2021-11-14 NOTE — FLOWSHEET NOTE
11/13/21 1954   Provider Notification   Reason for Communication Evaluate   Provider Name Dr. Jaz Chatman   Provider Notification Physician   Method of Communication Secure Message   Response Waiting for response   Notification Time 1954     Dr. Jaz Chatman was contacted in regards to Patient's Blood glucose for tonight being too high for the glucometer to read.  Awaiting MD's reply

## 2021-11-14 NOTE — PROGRESS NOTES
MD ordered for a lab check and for glucose and also a UA C&S and to give i5 units of regular insulin if BG if over 400 then recheck after 2 hours. If by then BG is below 400 cover per ordered scale.  Please see orders/ MAR

## 2021-11-14 NOTE — PLAN OF CARE
Problem: Skin Integrity:  Goal: Will show no infection signs and symptoms  Description: Will show no infection signs and symptoms  11/14/2021 0352 by Carlene Crabtree RN  Outcome: Ongoing  Note: Skin assessment complete. Coccyx reddened. Sensicare applied PRN. Turned and repositioned every two hours. Area kept free from moisture. Proper nourishment and fluids encouraged, as appropriate. Will continue to monitor for additional needs and changes in skin breakdown. Problem: Falls - Risk of:  Goal: Absence of physical injury  Description: Absence of physical injury  11/14/2021 0352 by Carlene Crabtree RN  Outcome: Ongoing     Problem: Pain:  Goal: Control of acute pain  Description: Control of acute pain  11/14/2021 0352 by Carlene Crabtree RN  Outcome: Ongoing  Note: Pt medicated with pain medication prn. Assessed all pain characteristics including level, type, location, frequency, and onset. Non-pharmacologic interventions offered to pt as well. Pt states pain is tolerable at this time. Will continue to monitor.

## 2021-11-14 NOTE — FLOWSHEET NOTE
11/14/21 1322   Encounter Summary   Services provided to: Patient   Referral/Consult From: [facility]   (11-14-21)   Volunteer Visit Yes   Complexity of Encounter Low   Length of Encounter 15 minutes   Spiritual/Yarsanism   Type Spiritual support   Intervention Prayer; [address] Patient received communion

## 2021-11-14 NOTE — PROGRESS NOTES
Progress Note  Date:2021       Room:[address] [place] [address]  Patient Ulysses Hinds     YOB: 1960     Age:61 y.o. Subjective    Subjective:  Symptoms:  (Hyperglycemia overnight - no obvious cause. No UTI. ). Diet:  Adequate intake. Activity level: Impaired due to weakness. Pain:  She reports no pain. Review of Systems  Objective         Vitals Last 24 Hours:  TEMPERATURE:  Temp  Av.6 °F (36.4 °C)  Min: 97.5 °F (36.4 °C)  Max: 97.7 °F (36.5 °C)  RESPIRATIONS RANGE: Resp  Av.5  Min: 18  Max: 20  PULSE OXIMETRY RANGE: SpO2  Av.3 %  Min: 90 %  Max: 95 %  PULSE RANGE: Pulse  Av.5  Min: 63  Max: 71  BLOOD PRESSURE RANGE: Systolic (93LCN), ONF:934 , Min:129 , EJM:725   ; Diastolic (01GPY), CPD:31, Min:61, Max:77    I/O (24Hr): Intake/Output Summary (Last 24 hours) at 2021 1057  Last data filed at 2021 0859  Gross per 24 hour   Intake 2246 ml   Output 1850 ml   Net 396 ml     Objective:  General Appearance:  Comfortable. Vital signs: (most recent): Blood pressure (!) 140/62, pulse 68, temperature 97.6 °F (36.4 °C), temperature source Oral, resp. rate 18, height 5' 4\" (1.626 m), weight 248 lb 14.4 oz (112.9 kg), SpO2 90 %. Vital signs are normal.    Lungs:  Normal effort and normal respiratory rate. Breath sounds clear to auscultation. Heart: Normal rate. S1 normal and S2 normal.      Labs/Imaging/Diagnostics    Labs:  CBC:No results for input(s): WBC, RBC, HGB, HCT, MCV, RDW, PLT in the last 72 hours. CHEMISTRIES:  Recent Labs     21  1209 21  1003 213   NA  --  136  --    K  --  4.4  --    CL  --  100  --    CO2  --  23  --    BUN  --  27*  --    CREATININE  --  2.35*  --    GLUCOSE  --  413* 565*   PHOS 3.3  --   --      PT/INR:No results for input(s): PROTIME, INR in the last 72 hours. APTT:No results for input(s): APTT in the last 72 hours.   LIVER PROFILE:No results for input(s): AST, ALT, BILIDIR, BILITOT, ALKPHOS in the last 72 hours. Imaging Last 24 Hours:  No results found.   Assessment//Plan           Hospital Problems           Last Modified POA    * (Principal) Stroke-like symptoms 11/11/2021 Yes    Chronic diastolic heart failure ([facility] [address] 11/10/2021 Yes    Mixed hyperlipidemia 11/10/2021 Yes    Type 2 diabetes mellitus with kidney complication, with long-term current use of insulin ([facility] [address] 11/10/2021 Yes    Essential hypertension 11/10/2021 Yes    Anemia in stage 4 chronic kidney disease ([facility] [address] 11/10/2021 Yes    Morbid obesity with BMI of 40.0-44.9, adult ([facility] [address] 11/10/2021 Yes    Disequilibrium syndrome 11/10/2021 Yes    Anxiety 11/10/2021 Yes    Altered mental status 11/11/2021 Yes        Assessment & Plan  Monitor hyperglycemia    Awaiting precert for [facility]  Electronically signed by Kal Campuzano MD on 11/14/21 at 10:57 AM EST

## 2021-11-14 NOTE — PROGRESS NOTES
wraps)  Position Activity Restriction  Other position/activity restrictions: Hemodialysis MWF  Subjective   General  Chart Reviewed: Yes  Additional Pertinent Hx: This is a 59 y.o. female with a significant past medical history of Chronic atrial fibrillation on Eliquis, Type 2 diabetes mellitus, essential systemic hypertension, coronary artery disease s/p CABG in 2004 and PTCA in 2019, heart failure with preserved ejection fraction LVEF 55% and end-stage renal disease secondary to diabetic and hypertensive nephropathy on routine hemodialysis on a Monday/Wednesday/Friday schedule at [address] dialysis unit on [facility] under the care of Dr. Keegan Alanis since August 2001 using IJ tunneled dialysis catheter, who presented to the hospital for further evaluation of acute on chronic right-sided weakness, Slurred speech and confusion. She was initially diagnosed with acute/subacute stroke in the left frontal lobe with right-sided weakness 4 months ago. Neurology consulted , MRI brain pending. Response To Previous Treatment: Patient with no complaints from previous session.   Family / Caregiver Present: No  Referring Practitioner: Dr Robert Morris: Patient is sitting up in bedside recliner upon arrval; agreeable to therapy   General Comment  Comments: DEDE Pope approved therapy   Pain Screening  Patient Currently in Pain: No  Vital Signs  Patient Currently in Pain: No       Orientation  Orientation  Overall Orientation Status: Impaired  Objective      Transfers  Sit to Stand: Stand by assistance  Stand to sit: Stand by assistance  Bed to Chair: Stand by assistance  Comment: Standing with RW  Ambulation  Ambulation?: Yes  Ambulation 1  Surface: level tile  Device: Rolling Walker  Assistance: Stand by assistance  Quality of Gait: Slightly unsteady, right LE clearing floor however no heel/toe gait, minimal knee flexion, wide ELISABET, very slow pace  Gait Deviations: Decreased step length; Decreased step height; Slow Annie; Increased ELISABET; Deviated path  Distance: 60ft   Comments: cues for upright posture and to stay inside RW  Stairs/Curb  Stairs?: Yes  Stairs  # Steps : 3  Stairs Height: 4\"  Rails: None  Device: Rolling walker  Assistance: Stand by assistance  Comment: educated patient in step to pattern with good follow through. Patient expresses having increased difficulty with steps         Other exercises  Other exercises?: Yes  Other exercises 1: toilet transfer   Other exercises 2: seated B LE exercises x15 reps   Other exercises 3: STS x3      Goals  Short term goals  Time Frame for Short term goals: 7 to 8 visits  Short term goal 1: Pt able to perform supine>sit at [address] term goal 2: Pt able to perform sit to supine min A   Short term goal 3: Pt able to perform sit to stand and pivot transfers with rolling walker, min ax 2`  Short term goal 4: Pt able to ambulate with rolling walker distance of 20 ft x 2, min A x 2.   Short term goal 5: Improve R LE strengthto 2 to 2+/5 to improve fucntion  Patient Goals   Patient goals : Can I come to [address] [place] [address] per week: 5 to 7 x/week  Specific instructions for Next Treatment: co-tx with GARCIA/OTR  Current Treatment Recommendations: Strengthening, Balance Training, Functional Mobility Training, Transfer Training, Endurance Training, Gait Training, Home Exercise Program, Safety Education & Training, Patient/Caregiver Education & Training  Safety Devices  Type of devices: Call light within reach, Gait belt, Left in bed        11/14/21 1408   PT Individual Minutes   Time In 6339   Time Out 1140   Minutes 38     Electronically signed by Leo Clark PTA on 11/14/21 at 2:14 PM EST

## 2021-11-14 NOTE — PROGRESS NOTES
Dr. Yolis Sarkar notified of BS recheck is 528. 15 units of Humalog ordered and to recheck BS in the AM. Updated Dr. Yolis Sarkar that we are awaiting patient's urination to send UA.  Electronically signed by Nico Glynn RN on 11/14/2021 at 12:33 AM

## 2021-11-14 NOTE — PLAN OF CARE
Problem: Skin Integrity:  Goal: Will show no infection signs and symptoms  Description: Will show no infection signs and symptoms  11/14/2021 1535 by Viki Huston RN  Outcome: Ongoing  Note: No new areas of skin breakdown noted. Pt has area of redness on coccyx. Pt able to reposition self. Problem: Falls - Risk of:  Goal: Will remain free from falls  Description: Will remain free from falls  11/14/2021 1535 by Viki Huston RN  Outcome: Ongoing  Note: Pt remains free from falls this shift. Bed is locked, in lowest position, and call light within reach.       Problem: ACTIVITY INTOLERANCE/IMPAIRED MOBILITY  Goal: Mobility/activity is maintained at optimum level for patient  11/14/2021 1535 by Viki Huston RN  Outcome: Ongoing  Note: Pt able to walk to commode with walker and standby assist.

## 2021-11-14 NOTE — PROGRESS NOTES
Speech Language Pathology  Speech Language Pathology  [facility]     Cognitive Treatment Note    Date: 11/14/2021  Patients Name: Cornelius Fuentes  MRN: [id number]  Diagnosis: Cognitive impairment  Patient Active Problem List   Diagnosis Code    Atherosclerosis of coronary artery bypass graft of native heart without angina pectoris I25.810    Acute renal failure ([facility] N17.9    Diabetes mellitus due to underlying condition with diabetic nephropathy, with long-term current use of insulin ([facility]) E08.21, Z79.4    Chronic diastolic heart failure ([facility] I50.32    Diabetic polyneuropathy associated with type 2 diabetes mellitus ([facility]) E11.42    History of coronary artery bypass graft Z95.1    Iron deficiency anemia D50.9    Spinal stenosis of lumbar region with neurogenic claudication M48.062    Mixed hyperlipidemia E78.2    Stage 4 chronic kidney disease ([facility]) N18.4    Type 2 diabetes mellitus with kidney complication, with long-term current use of insulin ([facility]) E11.29, Z79.4    Syncope and collapse R55    Obesity, Class II, BMI 35-39.9 E66.9    Thyroid nodule greater than or equal to 1 cm in diameter incidentally noted on imaging study E04.1    Essential hypertension I10    Anemia in stage 4 chronic kidney disease (HCC) N18.4, D63.1    Chronic ischemic heart disease I25.9    Ischemic stroke of frontal lobe ([facility]) I63.9    Stroke-like symptoms R29.90    Morbid obesity with BMI of 40.0-44.9, adult ([facility]) E66.01, Z68.41    Acute encephalopathy G93.40    Disequilibrium syndrome E87.8    Debility R53.81    Long-term memory loss R41.3    Muscle right arm weakness M62.81    Anxiety F41.9    Chronic midline low back pain with bilateral sciatica M54.41, M54.42, G89.29    Need for immunization against influenza Z23    Altered mental status R41.82       Pain: no     Cognitive Treatment    Treatment time: 7083-3964      Subjective: [x] Alert [x] Cooperative     [] Confused     [] Agitated    [x] Lethargic      Objective/Assessment:  Attention: Pt. Falling asleep throughout, reporting did not sleep well last night. Pt. Independently redirecting self throughout and apologetic. Orientation: n/a    Recall: not addressed directly. Pt. Recalling specific information shared from doctors earlier today. Continued pt ed provided throughout session re use of compensatory memory aides to facilitate recall of functional info. Pt encouraged to utilize her \"memory book\" on bedside table and to write down functional information/new information learned throughout the day. Pt verbalized understanding of all recommendations. Other: Spelling task completed -- 3 letter words: 100% accuracy anthony, 4 letter words: 80% accuracy anthony, 100% cued. Reading single words: (3-8 letters)- 90% accuracy, additional time provided to sound out words. Pt. Recognizing letter combinations within words and using this knowledge to assist in reading. Pt. relying more on decoding phonemes rather than sight recognition throughout. Reading sentences: (5-7 words)- 90% accuracy anthnoy, additional time provided to sound out and combine words. Pt. Continues to c/o trouble reading/spelling. Continued education provided re: tasks to assist in reading abilities (e.g., reviewing sight words, word searches, spelling tasks, reading tasks, practicing root words). Pt. Verbalized understanding and agreeable. Handouts of word searches and cross word puzzle given to Pt. Pt. Demonstrating word finding difficulties throughout. Encouraged Pt. To facilitate circumlocution to assist, Pt. Able to do so appropriately and find target words while using strategy. Convergent categorization task (ID category of 3 words, concrete)- 100% accuracy anthony. Session ending early d/t Pt. lethargy level. Plan:  [x] Continue ST services    [] Discharge from ST:      Discharge recommendations: []  Further therapy recommended at discharge. The patient should be able to tolerate at least 3 hours of therapy per day over 5 days or 15 hours over 7 days. [] Further therapy recommended at discharge. [] No therapy recommended at discharge. Treatment completed by:  BRAYAN Kwong, M.A., CCC-SLP

## 2021-11-14 NOTE — CARE COORDINATION
ONGOING DISCHARGE PLANNING NOTE:    Writer reviewed LSW notes, and discharge plan is to DC to ARU. Awaiting Pre-Cert.     Electronically signed by Ravi Mahoney RN on 11/14/2021 at 11:59 AM

## 2021-11-14 NOTE — CONSULTS
[facility] [place] ([facility]) Neurology Specialist  [address]  [facility], [address] [facility]:  [phone] or [phone]  FAX:  [phone]            Brief history: Jill Harrell is a 59 y.o. old female admitted on 11/10/2021 with amnesia     Subjective: No new neurological events overnight. The patient is resting comfortably. She denies any new complaints. She does not believe her speech is improved.      Objective: BP (!) 134/59   Pulse 61   Temp 97.5 °F (36.4 °C) (Oral)   Resp 16   Ht 5' 4\" (1.626 m)   Wt 248 lb 14.4 oz (112.9 kg)   SpO2 97%   BMI 42.72 kg/m²       Medications:    insulin glargine  60 Units SubCUTAneous BID    psyllium  1 packet Oral Daily    docusate sodium  100 mg Oral BID    aspirin  81 mg Oral Daily    atorvastatin  80 mg Oral Daily    busPIRone  7.5 mg Oral TID    carvedilol  6.25 mg Oral BID    apixaban  5 mg Oral BID    febuxostat  40 mg Oral Daily    ferrous sulfate  325 mg Oral BID WC    furosemide  80 mg Oral BID    gabapentin  300 mg Oral BID    insulin lispro  0-18 Units SubCUTAneous TID WC    insulin lispro  0-9 Units SubCUTAneous Nightly    pantoprazole  40 mg Oral QAM AC    ranolazine  1,000 mg Oral BID    tamsulosin  0.4 mg Oral Daily    traZODone  75 mg Oral Nightly    sodium chloride flush  5-40 mL IntraVENous 2 times per day        Neurological examination:    Mental status   Alert and oriented; intact memory with no confusion, speech or language problems; no hallucinations or delusions     Cranial nerves   II - visual fields intact to confrontation                                                III, IV, VI - extra-ocular muscles full: no pupillary defect; no CECELIA, no nystagmus, no ptosis                                                                      V - normal facial sensation                                                               VII - normal facial symmetry                                                             VIII - intact hearing                                                                             IX, X - symmetrical palate                                                                  XI - symmetrical shoulder shrug                                                       XII - midline tongue without atrophy or fasciculation     Motor function  Normal muscle bulk and tone; normal power 5/5 in all extremities except right lower extremity, pain giveaway weakness     Sensory function  decrease sensation in the right lower extremity   Cerebellar Intact fine motor movement. No involuntary movements or tremors     Reflex function Intact 2+ DTR and symmetric. Negative Babinski     Gait                  Not tested         Lab Results   Component Value Date    LDLCALC 28 04/09/2015    LDLCHOLESTEROL 72 12/26/2012     No components found for: CHLPL  Lab Results   Component Value Date    TRIG 168 04/09/2015    TRIG 266 02/12/2014    TRIG 218 (H) 12/26/2012     Lab Results   Component Value Date    HDL 43 04/09/2015    HDL 42 02/12/2014    HDL 43 12/26/2012     Lab Results   Component Value Date    LDLCALC 28 04/09/2015    LDLCALC 60 02/12/2014     No results found for: LABVLDL  Lab Results   Component Value Date    LABA1C 8.9 (H) 03/15/2021     Lab Results   Component Value Date     03/15/2021     Lab Results   Component Value Date    QRBSPYRK96 040 12/26/2012      Neurological work up:  CT head unremarkable   CTA head and neck 70% supraclinoid right ICA stenosis  MRI brain 9/13/2021 normal  2 D echo     Assessment and Recommendations:     Subjective amnesia:  Psych consult has been reviewed. Working diagnosis adjustment disorder with anxiety and her BuSpar has been increased. Right-sided numbness and tingling:  Patient with a previous history of cervical myeloradiculopathy status post cervical fusion  Await repeat MRI cervical spine      Right ICA supraclinoid cerebral stenosis:  Clinically symptomatic.   Continue maximal medical management with aspirin and Eliquis and high-dose Lipitor    We will follow. Magno Guevara MD  Neurology    This note is created with the assistance of a speech-recognition program. While intending to generate a document that actually reflects the content of the visit, the document can still have some errors including those of syntax and sound a- like substitutions which may escape proofreading. In such instances, actual meaning can be extrapolated by contextual derivation.

## 2021-11-15 ENCOUNTER — HOSPITAL ENCOUNTER (INPATIENT)
Age: 61
LOS: 8 days | Discharge: HOME | DRG: 056 | End: 2021-11-23
Attending: STUDENT IN AN ORGANIZED HEALTH CARE EDUCATION/TRAINING PROGRAM | Admitting: STUDENT IN AN ORGANIZED HEALTH CARE EDUCATION/TRAINING PROGRAM
Payer: COMMERCIAL

## 2021-11-15 ENCOUNTER — APPOINTMENT (OUTPATIENT)
Dept: MRI IMAGING | Age: 61
DRG: 882 | End: 2021-11-15
Attending: STUDENT IN AN ORGANIZED HEALTH CARE EDUCATION/TRAINING PROGRAM
Payer: COMMERCIAL

## 2021-11-15 VITALS
SYSTOLIC BLOOD PRESSURE: 140 MMHG | DIASTOLIC BLOOD PRESSURE: 62 MMHG | TEMPERATURE: 97.7 F | RESPIRATION RATE: 18 BRPM | HEIGHT: 64 IN | BODY MASS INDEX: 42.49 KG/M2 | OXYGEN SATURATION: 93 % | WEIGHT: 248.9 LBS | HEART RATE: 73 BPM

## 2021-11-15 DIAGNOSIS — G89.29 CHRONIC MIDLINE LOW BACK PAIN WITH BILATERAL SCIATICA: ICD-10-CM

## 2021-11-15 DIAGNOSIS — M54.41 CHRONIC MIDLINE LOW BACK PAIN WITH BILATERAL SCIATICA: ICD-10-CM

## 2021-11-15 DIAGNOSIS — D50.8 OTHER IRON DEFICIENCY ANEMIA: ICD-10-CM

## 2021-11-15 DIAGNOSIS — R53.82 CHRONIC FATIGUE: ICD-10-CM

## 2021-11-15 DIAGNOSIS — M54.42 CHRONIC MIDLINE LOW BACK PAIN WITH BILATERAL SCIATICA: ICD-10-CM

## 2021-11-15 DIAGNOSIS — M48.062 SPINAL STENOSIS OF LUMBAR REGION WITH NEUROGENIC CLAUDICATION: ICD-10-CM

## 2021-11-15 LAB
ABSOLUTE EOS #: 0.2 K/UL (ref 0–0.4)
ABSOLUTE IMMATURE GRANULOCYTE: ABNORMAL K/UL (ref 0–0.3)
ABSOLUTE LYMPH #: 1 K/UL (ref 1–4.8)
ABSOLUTE MONO #: 0.4 K/UL (ref 0.1–1.3)
ANION GAP SERPL CALCULATED.3IONS-SCNC: 10 MMOL/L (ref 9–17)
BASOPHILS # BLD: 1 % (ref 0–2)
BASOPHILS ABSOLUTE: 0 K/UL (ref 0–0.2)
BUN BLDV-MCNC: 49 MG/DL (ref 8–23)
BUN/CREAT BLD: ABNORMAL (ref 9–20)
CALCIUM SERPL-MCNC: 9.5 MG/DL (ref 8.6–10.4)
CHLORIDE BLD-SCNC: 99 MMOL/L (ref 98–107)
CO2: 24 MMOL/L (ref 20–31)
CREAT SERPL-MCNC: 3.2 MG/DL (ref 0.5–0.9)
DIFFERENTIAL TYPE: ABNORMAL
EOSINOPHILS RELATIVE PERCENT: 4 % (ref 0–4)
GFR AFRICAN AMERICAN: 18 ML/MIN
GFR NON-AFRICAN AMERICAN: 15 ML/MIN
GFR SERPL CREATININE-BSD FRML MDRD: ABNORMAL ML/MIN/{1.73_M2}
GFR SERPL CREATININE-BSD FRML MDRD: ABNORMAL ML/MIN/{1.73_M2}
GLUCOSE BLD-MCNC: 230 MG/DL (ref 65–105)
GLUCOSE BLD-MCNC: 275 MG/DL (ref 65–105)
GLUCOSE BLD-MCNC: 327 MG/DL (ref 65–105)
GLUCOSE BLD-MCNC: 365 MG/DL (ref 70–99)
GLUCOSE BLD-MCNC: 372 MG/DL (ref 65–105)
GLUCOSE BLD-MCNC: >600 MG/DL (ref 65–105)
HCT VFR BLD CALC: 34 % (ref 36–46)
HEMOGLOBIN: 11.8 G/DL (ref 12–16)
IMMATURE GRANULOCYTES: ABNORMAL %
LYMPHOCYTES # BLD: 17 % (ref 24–44)
MCH RBC QN AUTO: 33.5 PG (ref 26–34)
MCHC RBC AUTO-ENTMCNC: 34.7 G/DL (ref 31–37)
MCV RBC AUTO: 96.4 FL (ref 80–100)
MONOCYTES # BLD: 7 % (ref 1–7)
NRBC AUTOMATED: ABNORMAL PER 100 WBC
PDW BLD-RTO: 14.9 % (ref 11.5–14.9)
PLATELET # BLD: 150 K/UL (ref 150–450)
PLATELET ESTIMATE: ABNORMAL
PMV BLD AUTO: 8.5 FL (ref 6–12)
POTASSIUM SERPL-SCNC: 4.9 MMOL/L (ref 3.7–5.3)
RBC # BLD: 3.53 M/UL (ref 4–5.2)
RBC # BLD: ABNORMAL 10*6/UL
SEG NEUTROPHILS: 71 % (ref 36–66)
SEGMENTED NEUTROPHILS ABSOLUTE COUNT: 4 K/UL (ref 1.3–9.1)
SODIUM BLD-SCNC: 133 MMOL/L (ref 135–144)
WBC # BLD: 5.6 K/UL (ref 3.5–11)
WBC # BLD: ABNORMAL 10*3/UL

## 2021-11-15 PROCEDURE — 6370000000 HC RX 637 (ALT 250 FOR IP): Performed by: STUDENT IN AN ORGANIZED HEALTH CARE EDUCATION/TRAINING PROGRAM

## 2021-11-15 PROCEDURE — 6370000000 HC RX 637 (ALT 250 FOR IP): Performed by: FAMILY MEDICINE

## 2021-11-15 PROCEDURE — 97116 GAIT TRAINING THERAPY: CPT

## 2021-11-15 PROCEDURE — 6360000002 HC RX W HCPCS: Performed by: INTERNAL MEDICINE

## 2021-11-15 PROCEDURE — 2500000003 HC RX 250 WO HCPCS: Performed by: INTERNAL MEDICINE

## 2021-11-15 PROCEDURE — 1180000000 HC REHAB R&B

## 2021-11-15 PROCEDURE — 90935 HEMODIALYSIS ONE EVALUATION: CPT

## 2021-11-15 PROCEDURE — 99239 HOSP IP/OBS DSCHRG MGMT >30: CPT | Performed by: FAMILY MEDICINE

## 2021-11-15 PROCEDURE — 36415 COLL VENOUS BLD VENIPUNCTURE: CPT

## 2021-11-15 PROCEDURE — 5A1D70Z PERFORMANCE OF URINARY FILTRATION, INTERMITTENT, LESS THAN 6 HOURS PER DAY: ICD-10-PCS | Performed by: INTERNAL MEDICINE

## 2021-11-15 PROCEDURE — 82947 ASSAY GLUCOSE BLOOD QUANT: CPT

## 2021-11-15 PROCEDURE — 97530 THERAPEUTIC ACTIVITIES: CPT

## 2021-11-15 PROCEDURE — 80048 BASIC METABOLIC PNL TOTAL CA: CPT

## 2021-11-15 PROCEDURE — 85025 COMPLETE CBC W/AUTO DIFF WBC: CPT

## 2021-11-15 PROCEDURE — 72141 MRI NECK SPINE W/O DYE: CPT

## 2021-11-15 RX ORDER — DOCUSATE SODIUM 100 MG/1
100 CAPSULE, LIQUID FILLED ORAL 2 TIMES DAILY
Status: CANCELLED | OUTPATIENT
Start: 2021-11-15

## 2021-11-15 RX ORDER — SODIUM CHLORIDE 0.9 % (FLUSH) 0.9 %
5-40 SYRINGE (ML) INJECTION PRN
Status: CANCELLED | OUTPATIENT
Start: 2021-11-15

## 2021-11-15 RX ORDER — TAMSULOSIN HYDROCHLORIDE 0.4 MG/1
0.4 CAPSULE ORAL DAILY
Status: DISCONTINUED | OUTPATIENT
Start: 2021-11-16 | End: 2021-11-23 | Stop reason: HOSPADM

## 2021-11-15 RX ORDER — DEXTROSE MONOHYDRATE 25 G/50ML
12.5 INJECTION, SOLUTION INTRAVENOUS PRN
Status: DISCONTINUED | OUTPATIENT
Start: 2021-11-15 | End: 2021-11-23 | Stop reason: HOSPADM

## 2021-11-15 RX ORDER — SODIUM CHLORIDE 0.9 % (FLUSH) 0.9 %
10 SYRINGE (ML) INJECTION PRN
Status: CANCELLED | OUTPATIENT
Start: 2021-11-15

## 2021-11-15 RX ORDER — DEXTROSE MONOHYDRATE 50 MG/ML
100 INJECTION, SOLUTION INTRAVENOUS PRN
Status: DISCONTINUED | OUTPATIENT
Start: 2021-11-15 | End: 2021-11-23 | Stop reason: HOSPADM

## 2021-11-15 RX ORDER — FERROUS SULFATE 325(65) MG
325 TABLET ORAL 2 TIMES DAILY WITH MEALS
Status: CANCELLED | OUTPATIENT
Start: 2021-11-15

## 2021-11-15 RX ORDER — FERROUS SULFATE 325(65) MG
325 TABLET ORAL 2 TIMES DAILY WITH MEALS
Status: DISCONTINUED | OUTPATIENT
Start: 2021-11-16 | End: 2021-11-23 | Stop reason: HOSPADM

## 2021-11-15 RX ORDER — SODIUM CITRATE 4 % (5 ML)
1.9 SYRINGE (ML) MISCELLANEOUS PRN
Status: DISCONTINUED | OUTPATIENT
Start: 2021-11-15 | End: 2021-11-15

## 2021-11-15 RX ORDER — TRAZODONE HYDROCHLORIDE 50 MG/1
75 TABLET ORAL NIGHTLY
Status: DISCONTINUED | OUTPATIENT
Start: 2021-11-15 | End: 2021-11-23 | Stop reason: HOSPADM

## 2021-11-15 RX ORDER — TRAZODONE HYDROCHLORIDE 50 MG/1
75 TABLET ORAL NIGHTLY
Status: CANCELLED | OUTPATIENT
Start: 2021-11-15

## 2021-11-15 RX ORDER — SODIUM CITRATE 4 % (5 ML)
1.9 SYRINGE (ML) MISCELLANEOUS PRN
Status: CANCELLED | OUTPATIENT
Start: 2021-11-15

## 2021-11-15 RX ORDER — ONDANSETRON 4 MG/1
4 TABLET, ORALLY DISINTEGRATING ORAL EVERY 8 HOURS PRN
Status: CANCELLED | OUTPATIENT
Start: 2021-11-15

## 2021-11-15 RX ORDER — INSULIN GLARGINE 100 [IU]/ML
65 INJECTION, SOLUTION SUBCUTANEOUS 2 TIMES DAILY
Status: CANCELLED | OUTPATIENT
Start: 2021-11-15

## 2021-11-15 RX ORDER — PANTOPRAZOLE SODIUM 40 MG/1
40 TABLET, DELAYED RELEASE ORAL
Status: DISCONTINUED | OUTPATIENT
Start: 2021-11-16 | End: 2021-11-23 | Stop reason: HOSPADM

## 2021-11-15 RX ORDER — RANOLAZINE 1000 MG/1
1000 TABLET, EXTENDED RELEASE ORAL 2 TIMES DAILY
Status: DISCONTINUED | OUTPATIENT
Start: 2021-11-15 | End: 2021-11-23 | Stop reason: HOSPADM

## 2021-11-15 RX ORDER — NICOTINE POLACRILEX 4 MG
15 LOZENGE BUCCAL PRN
Status: DISCONTINUED | OUTPATIENT
Start: 2021-11-15 | End: 2021-11-23 | Stop reason: HOSPADM

## 2021-11-15 RX ORDER — ASPIRIN 81 MG/1
81 TABLET, CHEWABLE ORAL DAILY
Status: DISCONTINUED | OUTPATIENT
Start: 2021-11-16 | End: 2021-11-23 | Stop reason: HOSPADM

## 2021-11-15 RX ORDER — ACETAMINOPHEN 325 MG/1
650 TABLET ORAL EVERY 4 HOURS PRN
Status: CANCELLED | OUTPATIENT
Start: 2021-11-15

## 2021-11-15 RX ORDER — NICOTINE POLACRILEX 4 MG
15 LOZENGE BUCCAL PRN
Status: CANCELLED | OUTPATIENT
Start: 2021-11-15

## 2021-11-15 RX ORDER — SODIUM CHLORIDE 0.9 % (FLUSH) 0.9 %
5-40 SYRINGE (ML) INJECTION EVERY 12 HOURS SCHEDULED
Status: CANCELLED | OUTPATIENT
Start: 2021-11-15

## 2021-11-15 RX ORDER — PANTOPRAZOLE SODIUM 40 MG/1
40 TABLET, DELAYED RELEASE ORAL
Status: CANCELLED | OUTPATIENT
Start: 2021-11-16

## 2021-11-15 RX ORDER — ACETAMINOPHEN 325 MG/1
650 TABLET ORAL EVERY 4 HOURS PRN
Status: DISCONTINUED | OUTPATIENT
Start: 2021-11-15 | End: 2021-11-23 | Stop reason: HOSPADM

## 2021-11-15 RX ORDER — GABAPENTIN 300 MG/1
300 CAPSULE ORAL 2 TIMES DAILY
Status: CANCELLED | OUTPATIENT
Start: 2021-11-15

## 2021-11-15 RX ORDER — SODIUM CHLORIDE 0.9 % (FLUSH) 0.9 %
10 SYRINGE (ML) INJECTION PRN
Status: DISCONTINUED | OUTPATIENT
Start: 2021-11-15 | End: 2021-11-23 | Stop reason: HOSPADM

## 2021-11-15 RX ORDER — FUROSEMIDE 40 MG/1
80 TABLET ORAL 2 TIMES DAILY
Status: DISCONTINUED | OUTPATIENT
Start: 2021-11-16 | End: 2021-11-23 | Stop reason: HOSPADM

## 2021-11-15 RX ORDER — CARVEDILOL 6.25 MG/1
6.25 TABLET ORAL 2 TIMES DAILY
Status: DISCONTINUED | OUTPATIENT
Start: 2021-11-15 | End: 2021-11-23 | Stop reason: HOSPADM

## 2021-11-15 RX ORDER — ONDANSETRON 4 MG/1
4 TABLET, ORALLY DISINTEGRATING ORAL EVERY 8 HOURS PRN
Status: DISCONTINUED | OUTPATIENT
Start: 2021-11-15 | End: 2021-11-23 | Stop reason: HOSPADM

## 2021-11-15 RX ORDER — GABAPENTIN 300 MG/1
300 CAPSULE ORAL 2 TIMES DAILY
Status: DISCONTINUED | OUTPATIENT
Start: 2021-11-15 | End: 2021-11-23 | Stop reason: HOSPADM

## 2021-11-15 RX ORDER — ATORVASTATIN CALCIUM 80 MG/1
80 TABLET, FILM COATED ORAL DAILY
Status: CANCELLED | OUTPATIENT
Start: 2021-11-16

## 2021-11-15 RX ORDER — INSULIN GLARGINE 100 [IU]/ML
65 INJECTION, SOLUTION SUBCUTANEOUS 2 TIMES DAILY
Status: DISCONTINUED | OUTPATIENT
Start: 2021-11-15 | End: 2021-11-15 | Stop reason: HOSPADM

## 2021-11-15 RX ORDER — SODIUM CHLORIDE 0.9 % (FLUSH) 0.9 %
5-40 SYRINGE (ML) INJECTION PRN
Status: DISCONTINUED | OUTPATIENT
Start: 2021-11-15 | End: 2021-11-15

## 2021-11-15 RX ORDER — POLYETHYLENE GLYCOL 3350 17 G/17G
17 POWDER, FOR SOLUTION ORAL DAILY
Status: DISCONTINUED | OUTPATIENT
Start: 2021-11-15 | End: 2021-11-15

## 2021-11-15 RX ORDER — SODIUM PHOSPHATE, DIBASIC AND SODIUM PHOSPHATE, MONOBASIC 7; 19 G/133ML; G/133ML
1 ENEMA RECTAL
Status: COMPLETED | OUTPATIENT
Start: 2021-11-15 | End: 2021-11-15

## 2021-11-15 RX ORDER — DEXTROSE MONOHYDRATE 25 G/50ML
12.5 INJECTION, SOLUTION INTRAVENOUS PRN
Status: CANCELLED | OUTPATIENT
Start: 2021-11-15

## 2021-11-15 RX ORDER — SENNA PLUS 8.6 MG/1
2 TABLET ORAL DAILY PRN
Status: DISCONTINUED | OUTPATIENT
Start: 2021-11-15 | End: 2021-11-23 | Stop reason: HOSPADM

## 2021-11-15 RX ORDER — LACTULOSE 10 G/15ML
20 SOLUTION ORAL 3 TIMES DAILY PRN
Status: DISCONTINUED | OUTPATIENT
Start: 2021-11-15 | End: 2021-11-23 | Stop reason: HOSPADM

## 2021-11-15 RX ORDER — SODIUM CHLORIDE 9 MG/ML
25 INJECTION, SOLUTION INTRAVENOUS PRN
Status: CANCELLED | OUTPATIENT
Start: 2021-11-15

## 2021-11-15 RX ORDER — ONDANSETRON 2 MG/ML
4 INJECTION INTRAMUSCULAR; INTRAVENOUS EVERY 6 HOURS PRN
Status: CANCELLED | OUTPATIENT
Start: 2021-11-15

## 2021-11-15 RX ORDER — DEXTROSE MONOHYDRATE 50 MG/ML
100 INJECTION, SOLUTION INTRAVENOUS PRN
Status: CANCELLED | OUTPATIENT
Start: 2021-11-15

## 2021-11-15 RX ORDER — BUSPIRONE HYDROCHLORIDE 5 MG/1
7.5 TABLET ORAL 3 TIMES DAILY
Status: CANCELLED | OUTPATIENT
Start: 2021-11-15

## 2021-11-15 RX ORDER — FEBUXOSTAT 40 MG/1
40 TABLET, FILM COATED ORAL DAILY
Status: CANCELLED | OUTPATIENT
Start: 2021-11-16

## 2021-11-15 RX ORDER — ASPIRIN 81 MG/1
81 TABLET, CHEWABLE ORAL DAILY
Status: CANCELLED | OUTPATIENT
Start: 2021-11-16

## 2021-11-15 RX ORDER — CARVEDILOL 6.25 MG/1
6.25 TABLET ORAL 2 TIMES DAILY
Status: CANCELLED | OUTPATIENT
Start: 2021-11-15

## 2021-11-15 RX ORDER — DOCUSATE SODIUM 100 MG/1
100 CAPSULE, LIQUID FILLED ORAL 2 TIMES DAILY
Status: DISCONTINUED | OUTPATIENT
Start: 2021-11-15 | End: 2021-11-23 | Stop reason: HOSPADM

## 2021-11-15 RX ORDER — ACETAMINOPHEN 325 MG/1
650 TABLET ORAL EVERY 4 HOURS PRN
Status: DISCONTINUED | OUTPATIENT
Start: 2021-11-15 | End: 2021-11-16

## 2021-11-15 RX ORDER — FUROSEMIDE 40 MG/1
80 TABLET ORAL 2 TIMES DAILY
Status: CANCELLED | OUTPATIENT
Start: 2021-11-15

## 2021-11-15 RX ORDER — LACTULOSE 10 G/15ML
20 SOLUTION ORAL 3 TIMES DAILY PRN
Status: CANCELLED | OUTPATIENT
Start: 2021-11-15

## 2021-11-15 RX ORDER — BISACODYL 10 MG
10 SUPPOSITORY, RECTAL RECTAL DAILY PRN
Status: DISCONTINUED | OUTPATIENT
Start: 2021-11-15 | End: 2021-11-23 | Stop reason: HOSPADM

## 2021-11-15 RX ORDER — FEBUXOSTAT 40 MG/1
40 TABLET, FILM COATED ORAL DAILY
Status: DISCONTINUED | OUTPATIENT
Start: 2021-11-16 | End: 2021-11-23 | Stop reason: HOSPADM

## 2021-11-15 RX ORDER — LACTULOSE 10 G/15ML
20 SOLUTION ORAL 3 TIMES DAILY PRN
Status: DISCONTINUED | OUTPATIENT
Start: 2021-11-15 | End: 2021-11-15 | Stop reason: HOSPADM

## 2021-11-15 RX ORDER — ATORVASTATIN CALCIUM 80 MG/1
80 TABLET, FILM COATED ORAL DAILY
Status: DISCONTINUED | OUTPATIENT
Start: 2021-11-16 | End: 2021-11-23 | Stop reason: HOSPADM

## 2021-11-15 RX ORDER — TAMSULOSIN HYDROCHLORIDE 0.4 MG/1
0.4 CAPSULE ORAL DAILY
Status: CANCELLED | OUTPATIENT
Start: 2021-11-16

## 2021-11-15 RX ORDER — RANOLAZINE 500 MG/1
1000 TABLET, EXTENDED RELEASE ORAL 2 TIMES DAILY
Status: CANCELLED | OUTPATIENT
Start: 2021-11-15

## 2021-11-15 RX ORDER — SODIUM CHLORIDE 0.9 % (FLUSH) 0.9 %
5-40 SYRINGE (ML) INJECTION EVERY 12 HOURS SCHEDULED
Status: DISCONTINUED | OUTPATIENT
Start: 2021-11-15 | End: 2021-11-16

## 2021-11-15 RX ORDER — ONDANSETRON 2 MG/ML
4 INJECTION INTRAMUSCULAR; INTRAVENOUS EVERY 6 HOURS PRN
Status: DISCONTINUED | OUTPATIENT
Start: 2021-11-15 | End: 2021-11-15

## 2021-11-15 RX ORDER — SODIUM CHLORIDE 9 MG/ML
25 INJECTION, SOLUTION INTRAVENOUS PRN
Status: DISCONTINUED | OUTPATIENT
Start: 2021-11-15 | End: 2021-11-23 | Stop reason: HOSPADM

## 2021-11-15 RX ORDER — BUSPIRONE HYDROCHLORIDE 5 MG/1
7.5 TABLET ORAL 3 TIMES DAILY
Status: DISCONTINUED | OUTPATIENT
Start: 2021-11-15 | End: 2021-11-17

## 2021-11-15 RX ORDER — INSULIN GLARGINE 100 [IU]/ML
65 INJECTION, SOLUTION SUBCUTANEOUS 2 TIMES DAILY
Status: DISCONTINUED | OUTPATIENT
Start: 2021-11-15 | End: 2021-11-17

## 2021-11-15 RX ADMIN — TRAZODONE HYDROCHLORIDE 75 MG: 50 TABLET ORAL at 20:35

## 2021-11-15 RX ADMIN — Medication 1.9 ML: at 14:43

## 2021-11-15 RX ADMIN — FUROSEMIDE 80 MG: 40 TABLET ORAL at 17:26

## 2021-11-15 RX ADMIN — SODIUM PHOSPHATE 1 ENEMA: 7; 19 ENEMA RECTAL at 17:27

## 2021-11-15 RX ADMIN — INSULIN LISPRO 6 UNITS: 100 INJECTION, SOLUTION INTRAVENOUS; SUBCUTANEOUS at 18:33

## 2021-11-15 RX ADMIN — GABAPENTIN 300 MG: 300 CAPSULE ORAL at 20:36

## 2021-11-15 RX ADMIN — INSULIN LISPRO 9 UNITS: 100 INJECTION, SOLUTION INTRAVENOUS; SUBCUTANEOUS at 08:55

## 2021-11-15 RX ADMIN — INSULIN GLARGINE 60 UNITS: 100 INJECTION, SOLUTION SUBCUTANEOUS at 08:55

## 2021-11-15 RX ADMIN — BUSPIRONE HYDROCHLORIDE 7.5 MG: 5 TABLET ORAL at 20:36

## 2021-11-15 RX ADMIN — CARVEDILOL 6.25 MG: 6.25 TABLET, FILM COATED ORAL at 20:36

## 2021-11-15 RX ADMIN — ALTEPLASE 2 MG: 2.2 INJECTION, POWDER, LYOPHILIZED, FOR SOLUTION INTRAVENOUS at 11:10

## 2021-11-15 RX ADMIN — PSYLLIUM HUSK 1 PACKET: 3.4 POWDER ORAL at 15:39

## 2021-11-15 RX ADMIN — ACETAMINOPHEN 650 MG: 325 TABLET ORAL at 20:42

## 2021-11-15 RX ADMIN — ATORVASTATIN CALCIUM 80 MG: 80 TABLET, FILM COATED ORAL at 15:36

## 2021-11-15 RX ADMIN — APIXABAN 5 MG: 5 TABLET, FILM COATED ORAL at 20:36

## 2021-11-15 RX ADMIN — RANOLAZINE 1000 MG: 1000 TABLET, FILM COATED, EXTENDED RELEASE ORAL at 20:35

## 2021-11-15 RX ADMIN — INSULIN GLARGINE 65 UNITS: 100 INJECTION, SOLUTION SUBCUTANEOUS at 20:38

## 2021-11-15 RX ADMIN — PANTOPRAZOLE SODIUM 40 MG: 40 TABLET, DELAYED RELEASE ORAL at 06:00

## 2021-11-15 RX ADMIN — TAMSULOSIN HYDROCHLORIDE 0.4 MG: 0.4 CAPSULE ORAL at 15:36

## 2021-11-15 RX ADMIN — DOCUSATE SODIUM 100 MG: 100 CAPSULE, LIQUID FILLED ORAL at 15:37

## 2021-11-15 RX ADMIN — ASPIRIN 81 MG: 81 TABLET, CHEWABLE ORAL at 15:36

## 2021-11-15 RX ADMIN — LACTULOSE 20 G: 20 SOLUTION ORAL at 17:26

## 2021-11-15 RX ADMIN — BUSPIRONE HYDROCHLORIDE 7.5 MG: 5 TABLET ORAL at 15:40

## 2021-11-15 RX ADMIN — FEBUXOSTAT 40 MG: 40 TABLET, FILM COATED ORAL at 15:44

## 2021-11-15 RX ADMIN — INSULIN LISPRO 15 UNITS: 100 INJECTION, SOLUTION INTRAVENOUS; SUBCUTANEOUS at 15:40

## 2021-11-15 RX ADMIN — INSULIN LISPRO 6 UNITS: 100 INJECTION, SOLUTION INTRAVENOUS; SUBCUTANEOUS at 20:44

## 2021-11-15 ASSESSMENT — PAIN SCALES - GENERAL
PAINLEVEL_OUTOF10: 5
PAINLEVEL_OUTOF10: 0
PAINLEVEL_OUTOF10: 2
PAINLEVEL_OUTOF10: 0

## 2021-11-15 NOTE — PROGRESS NOTES
[address] [facility]   Acute Inpatient Rehab Preadmission Assessment    Patient Name: Francisco Farris        MRN: [id number]    : 1960  (59 y.o.)   Gender: female     Admitted from:   [address] [place] [address]  [][facility]   [][facility]   [][facility]   []Outside Admission - Location:                                 [x]Initial         []Updated    Date of Onset / Admission to the acute hospital: 11/10/21    Inpatient Rehabilitation Admitting Diagnosis:  Stroke like symptoms with functional decline from previous stroke deficit      Did patient have surgery/procedures? [x]No  []Yes:      Physicians: Ten Simmons    Risk for clinical complications: Moderate to High    Co-morbidities:     1. Chronic diastolic heart failure  2. HTN  3. Hyperlipidemia  4. DM II  5. ESRD on HD MWF  6. Anemia of chronic disease  7. Morbid obesity  8. Anxiety  9.  Disequilibrium      Financial Information  Primary insurance:  []Medicare [] Medicare HMO  [x]Commercial insurance    []Medicaid   [] Medicaid HMO []Workers Compensation   []Personal Pay    Secondary Insurance:  []Medicare [] Medicare HMO  []Commercial insurance    []Medicaid  []Medicaid HMO  []Workers Compensation [x]None    Precautions:   []Cardiac Precautions:    []Total hip precautions:    []Weight Bearing status:  [x]Safety Precautions/Concerns:  Fall Risk, General Precautions  [x]Visually impaired: Wears glasses for reading   []Hard of Hearing:     Isolation Precautions:         [x]Yes  []No  If Yes:  [] Droplet  [x]Contact []Airborne     []VRE     [x]MRSA     []C-diff         [] TB       [] ESBL [] MDRO          [] Other:        Physiatrist:  []   Dr. Gayle Fung     []   Dr. Gina Hathaway  [x]   Dr. Lisette Joyce  []   Dr. Luba Ross    Patients Occupation: Unknown    Reviewed Lab and Diagnostic reports from Current Admission: Yes    Patients Prior Functional  Level: Prior Function  ADL Assistance: Independent  Homemaking Assistance: Needs assistance (sisters assist )  Ambulation Assistance: Independent (5IE )  Transfer Assistance: Independent  Additional Comments: Pt recently discharged from Rehab at [facility]  on 8/26/21 and again on 9-26-21. Pt was going to dialysis 3 times per week. Pt is demonstrating some confusion/memory deficits and above information taken from previous therapy eval 8-12-21. Pt able to identify that her mother is still living with her and her sisters assist with caring for her mother. History of current illness, per PM&R Consult:  Luzma Henderson is a 59 y.o. RHD female admitted to [facility] on 11/10/2021.       Patient admitted with episode of AMS and weakness R extremities at dialysis. She was having acute memory loss. She is known from 2 previous acute rehab admissions after ischemic CVA.     Nephrology consulted to manage ESRD and hemodialysis on MWF.     Neurology consulted. MRI negative for acute changes. Planning EEG. If recurrent amnesia continues they will consider lumbar puncture to rule out encephalitis. She has known 70% R ICA stenosis.     Patient seen in hemodialysis. She notes that her memory is mildly improved since admission. She now recognizes her sister. She remembers me from previous admission. She denies pain but reports numbness from mid forearm extending to all digits in her R hand. She also notes significant weakness in her R leg. These have worsened from her previous level of function.       Prognosis: Fair    Current functional status for upper extremity ADLs:  UE Bathing: Minimal assistance  UE Dressing: Minimal assistance    Current functional status for lower extremity ADLs:  LE Bathing: Moderate assistance  LE Dressing:  Moderate assistance    Current functional status for bed, chair, wheelchair transfers:  Transfers  Sit to Stand: Stand by assistance  Stand to sit: Stand by assistance  Bed to Chair: Stand by assistance  Stand Pivot Transfers: Minimal Assistance  Comment: Standing with RW    Current functional status for toilet transfers: Toilet Transfers  Toilet - Technique: Ambulating  Equipment Used: Grab bars  Toilet Transfer: Minimal assistance  Toilet Transfers Comments: Verbal cues for hand placement and safety with increased time to complete    Current functional status for locomotion:  Ambulation  Ambulation?: Yes  Ambulation 1  Surface: level tile  Device: Rolling Walker  Assistance: Stand by assistance  Quality of Gait: right LE clearing floor however no heel/toe gait, minimal knee flexion, wide ELISABET, very slow pace  Gait Deviations: Decreased step length, Decreased step height, Slow Annie, Increased ELISABET, Deviated path  Distance: 25ft (distance limited d/t transport arriving to take patient to M)  Comments: Patient able to let go of RW with 1 UE to reach for items intermittently while ambualting. No LOB noted     Current functional status for comprehension: Complete independence    Current functional status for expression: Minimal contact assistance    Current functional status for social interaction: Complete independence    Current functional status for problem solving: Complete independence    Current functional status for memory:  Moderate assistance    Current Deficits R/T Impairment: Impaired Functional Mobility and Decreased ADLs    Required Therapy:   [x] Physical Therapy  [x] Occupational Therapy   [x] Speech Therapy, as appropriate    Additional Services:    [x]     [] Recreational Therapy, as appropriate    [x] Nutrition    [x] Dialysis:  MWF  [] Cultural Needs Identified  [x] Special Equipment Needs: BLE Lymphedema wraps  [] Other    Rehab Justification:  Needs 3 hrs therapy per day or 15 hours per week:  Yes  Identified Rehab Nursing needs: Yes  Intense Interdisciplinary need:  Yes  Need for 24 hr physician supervision:  Yes  Measurable improved quality of life:  Yes  Willingness to participate:  Yes  Medical Necessity:  Yes  Patient able to tolerate care proposed:  Yes    Expected Discharge Destination/Functional Level:  Home with assist  Expected length of time to achieve that level of improvement: 1-2 weeks  Expected Post Discharge Treatments: Home with possible Home Care    Other information relevant to patient's care needs:  N/A    Acute Inpatient Rehabilitation Disclosure Statement will be provided to patient upon admission to ARU with patient's verbalization of understanding. I have reviewed and concur with the findings and results of the pre-admission screening assessment completed by the Inpatient Rehabilitation Admissions Coordinator.

## 2021-11-15 NOTE — FLOWSHEET NOTE
11/15/21 1235   Encounter Summary   Services provided to: Patient   Referral/Consult From: Nhi   Continue Visiting   (11/15/21)   Complexity of Encounter Low   Length of Encounter 15 minutes   Spiritual/Denominational   Type Spiritual support   Intervention Anointing   Sacraments   Sacrament of Sick-Anointing Anointed  (Kirstie Hanks 11/15/21)

## 2021-11-15 NOTE — PROGRESS NOTES
HEMODIALYSIS POST TREATMENT NOTE    Treatment time ordered: 150mins    Actual treatment time: 150 mins    UltraFiltration Goal: 2kilo  UltraFiltration Removed: 2kilo      Pre Treatment weight: 112.9  Post Treatment weight: 110.9  Estimated Dry Weight: tbd    Access used:     Central Venous Catheter:          Tunneled or Non-tunneled: tunnel           Site: r chest          Access Flow: better after cathflo instilled for 1 hrs prior to start of hd. 300 BFR during hd tx      Internal Access:       AV Fistula or AV Graft: na         Site: na       Access Flow: na       Sign and symptoms of infection: no       If YES: na    Medications or blood products given: no    [facility]    [facility]    Summary of response to treatment: tolerated well    Explain if orders NOT met, was physician notified:[email or web address]      ACES flowsheet faxed to patient unit/ placed in patient chart: yes    Post assessment completed: yes    Report given to: [email or web address]      * Intra-treatment documented Safety Checks include the followin) Access and face visible at all times. 2) All connections and blood lines are secure with no kinks. 3) NVL alarm engaged. 4) Hemosafe device applied (if applicable). 5) No collapse of Arterial or Venous blood chambers. 6) All blood lines / pump segments in the air detectors.

## 2021-11-15 NOTE — PLAN OF CARE
Problem: Skin Integrity:  Goal: Will show no infection signs and symptoms  Description: Will show no infection signs and symptoms  11/15/2021 0332 by Norman Jorgensen RN  Outcome: Ongoing     Problem: Skin Integrity:  Goal: Absence of new skin breakdown  Description: Absence of new skin breakdown  Outcome: Ongoing  Note: Skin assessment complete. . Coccyx reddened. Sensicare applied PRN. Turned and repositioned every two hours. Area kept free from moisture. Proper nourishment and fluids encouraged, as appropriate. Will continue to monitor for additional needs and changes in skin breakdown. Problem: Falls - Risk of:  Goal: Will remain free from falls  Description: Will remain free from falls  11/15/2021 0332 by Norman Jorgensen RN  Outcome: Ongoing     Problem: Falls - Risk of:  Goal: Will remain free from falls  Description: Will remain free from falls  11/15/2021 0332 by Norman Jorgensen RN  Outcome: Ongoing     Problem: Pain:  Goal: Pain level will decrease  Description: Pain level will decrease  Outcome: Ongoing  Note: No pain at this time. 0/10 pain scale.

## 2021-11-15 NOTE — PROGRESS NOTES
Dialysis Safety Checks:    Patient ID Verified (Y/N) [id number]     -Hepatitis Test                   Date      Result  Hepatitis B Surface Antigen   21 neg     Hepatitis B Surface Antibody 21 pos     Hepatitis B Core Antibody        -Treatment Initiation  Blood Vol Processed Goal (Liters)  Pump Speed x Treatment Hours x 60 Minutes    Target Fluid Removal 2kilo     * Intra-treatment documented Safety Checks include the followin) Access and face visible at all times. 2) All connections and blood lines are secure with no kinks. 3) NVL alarm engaged. 4) Hemosafe device applied (if applicable). 5) No collapse of Arterial or Venous blood chambers. 6) All blood lines / pump segments in the air detectors.   --------------------------------------------------------------------------------

## 2021-11-15 NOTE — FLOWSHEET NOTE
11/15/21 1220   Encounter Summary   Services provided to: Patient   Referral/Consult From: Rounding   Continue Visiting   (11-15-21 V)   Volunteer Visit Yes   Complexity of Encounter Low   Length of Encounter 15 minutes   Spiritual/Congregation   Type Spiritual support   Intervention Prayer; [address] Patient received communion

## 2021-11-15 NOTE — CARE COORDINATION
Patient insurance approved the patient to go to the ARU. Jennifer reported that the patient can admit on this date, but that apparently she has to have a bowel movement prior. As long as the DC/readmit is completed and the anticoagulation is continued, and the patient has a bowel movement, the nurse can then call report to [phone] and give report and discuss the time for transport.

## 2021-11-15 NOTE — PROGRESS NOTES
cathflo instilled into arterial, venous limb of hd cath. 1.9ml each.  Will dwell for 1 hr per dr Yoder Block mike

## 2021-11-15 NOTE — PROGRESS NOTES
Rcv'd approval for ARU from [facility] via [facility]. Pt can admit through Tuesday, 11-16-21, under this auth. It pt does not admit by Tuesday, will need to submit updated notes to MMO for review. Notified Carol Lyman, and requested when pt will be medically ready for ARU. Per notes, pt has not yet had BM and pt is awaiting repeat MRI-will need to be medically cleared prior to ARU. Will need d/c readmit completed with continuation of DVT prophylaxis and [place] called to [phone]. Writer will complete Admission Assessment in anticipation of pt's admit to ARU. Admission Assessment completed and Dr Deep Gonzalez notified. Pt's benefits for ARU verified with the automated system @ [facility] and are as follows:  100% coverage as pt's annual deductible of $500, co-ins of $1500, OOP max of $2000 has been met. Notified [facility] [place] [facility], that per notes pt's tunneled cath is not patent, and she will need to have access for dialysis prior to ARU. Spoke with Mirella Dodge who states pt's tunneled cath is now patent, neuro cleared pt re:  MRI, and pt has orders for laxative. Writer informed Mirella Dodge that pt may admit to [place] [facility] when medically cleared.

## 2021-11-15 NOTE — PROGRESS NOTES
Patient has not yet had any BM. Had substantial amount of urine output throughout the shift with no complain of pain or any distress noted. Each urination, patient pivoted to commode from bed and back. With no sign of distress noted. Patient's Blood Glucose has within sliding scale.

## 2021-11-15 NOTE — PROGRESS NOTES
Department of Internal Medicine  Nephrology Krystal Sandy MD Progress Note    Reason for consultation: Management of end-stage renal disease. Consulting physician: Ariana Norris MD.    Interval history: Patient was seen and examined today. Patient seen at dialysis. She does not have any shortness of breath fever or nausea. Tunneled catheter with poor  function difficult to aspirate venous and arterial line. History of present illness: This is a 59 y.o. female with a significant past medical history of Chronic atrial fibrillation [on Eliquis], Type 2 diabetes mellitus, essential systemic hypertension, coronary artery disease [s/p CABG in 2004 and PTCA in 2019], heart failure with preserved ejection fraction [LVEF 55%] and end-stage renal disease secondary to diabetic and hypertensive nephropathy [on routine hemodialysis on a Monday/Wednesday/Friday schedule at [address] dialysis unit on [facility] under the care of Dr. Amelia Fuentes since August 2001 using IJ tunneled dialysis catheter], who presented to the hospital for further evaluation of acute on chronic right-sided weakness, Slurred speech and confusion. She was initially diagnosed with acute/subacute stroke in the left frontal lobe with right-sided weakness 4 months ago. CT scan of the brain performed at presentation showed no acute finding but CT angiogram of the head and neck showed 70% stenosis in the cavernous/supraclinoid segment of the right internal carotid artery. There was also minimal punctate calcification along the posterior aspect of the right globe.     Scheduled Meds:   insulin glargine  60 Units SubCUTAneous BID    psyllium  1 packet Oral Daily    docusate sodium  100 mg Oral BID    aspirin  81 mg Oral Daily    atorvastatin  80 mg Oral Daily    busPIRone  7.5 mg Oral TID    carvedilol  6.25 mg Oral BID    apixaban  5 mg Oral BID    febuxostat  40 mg Oral Daily    ferrous sulfate  325 mg Oral BID WC    furosemide 80 mg Oral BID    gabapentin  300 mg Oral BID    insulin lispro  0-18 Units SubCUTAneous TID WC    insulin lispro  0-9 Units SubCUTAneous Nightly    pantoprazole  40 mg Oral QAM AC    ranolazine  1,000 mg Oral BID    tamsulosin  0.4 mg Oral Daily    traZODone  75 mg Oral Nightly    sodium chloride flush  5-40 mL IntraVENous 2 times per day     Continuous Infusions:   sodium chloride      dextrose       PRN Meds:.sodium citrate, sodium citrate, anticoagulant sodium citrate, anticoagulant sodium citrate, sodium chloride flush, sodium chloride flush, sodium chloride, acetaminophen, ondansetron **OR** ondansetron, glucose, dextrose, glucagon (rDNA), dextrose    Physical Exam:    VITALS:  BP (!) 144/74   Pulse 75   Temp 97.3 °F (36.3 °C) (Oral)   Resp 18   Ht 5' 4\" (1.626 m)   Wt 248 lb 14.4 oz (112.9 kg)   SpO2 95%   BMI 42.72 kg/m²   24HR INTAKE/OUTPUT:      Intake/Output Summary (Last 24 hours) at 11/15/2021 1118  Last data filed at 11/15/2021 5235  Gross per 24 hour   Intake 1416 ml   Output 3375 ml   Net -1959 ml     Constitutional: alert, appears stated age and cooperative    Skin: Skin color, texture, turgor normal. No rashes or lesions    Head: Normocephalic, without obvious abnormality, atraumatic     Cardiovascular/Edema: irregularly irregular rhythm    Respiratory: clear to auscultation bilaterally    Abdomen: soft, non-tender; bowel sounds normal; no masses,  no organomegaly    Back: symmetric, no curvature. ROM normal. No CVA tenderness. Extremities: edema +    Neuro:  Awake but with slurred speech; muscle power 1/5 in right upper and lower extremity.     CBC:   Recent Labs     11/15/21  0847   WBC 5.6   HGB 11.8*        BMP:    Recent Labs     11/13/21  2113 11/15/21  0847   NA  --  133*   K  --  4.9   CL  --  99   CO2  --  24   BUN  --  49*   CREATININE  --  3.20*   GLUCOSE 565* 365*     Lab Results   Component Value Date    NITRU NEGATIVE 11/14/2021    COLORU Yellow 11/14/2021 PHUR 5.0 11/14/2021    WBCUA 5 TO 10 11/14/2021    RBCUA 0 TO 2 11/14/2021    MUCUS NOT REPORTED 11/14/2021    TRICHOMONAS NOT REPORTED 11/14/2021    YEAST FEW 11/14/2021    BACTERIA MANY 11/14/2021    SPECGRAV 1.014 11/14/2021    LEUKOCYTESUR TRACE 11/14/2021    UROBILINOGEN Normal 11/14/2021    BILIRUBINUR NEGATIVE 11/14/2021    GLUCOSEU 3+ 11/14/2021    KETUA NEGATIVE 11/14/2021    AMORPHOUS NOT REPORTED 11/14/2021     Urine Creatinine:     Lab Results   Component Value Date    LABCREA 72.0 11/14/2021     IMPRESSION/RECOMMENDATIONS:    1. Acute kidney injury [secondary to acute tubular necrosis and dialysis dependent since August 2021] - will maintain [facility] hemodialysis schedule. She is scheduled for AV fistula placement in 2 weeks at [facility] with Dr. Elisabeth Dietrich. Renal diet,i.e 2-gram sodium,2-gram potassium,1500 ml fluid restriction,1-gram phosphorus, 1800 KCal and 1.2 gram protein per day. 2.  Acute on chronic right-sided weakness - Neurology input noted-MRI cervical spine done today    3. Systemic hypertension - blood pressure control is fair. Continue current medications. 4.  Mineral bone disease profile - Serum phosphorus 3.3 mg/dL is within target range. 5. Tunneled catheter dysfunction-Activase to each port to dwell for 1 hour and reassess catheter function for dialysis. Prognosis is guarded.      MD LESIA Dumont  Attending Nephrologist  11/15/2021 11:18 AM

## 2021-11-15 NOTE — PROGRESS NOTES
[address]   INPATIENT OCCUPATIONAL THERAPY  PROGRESS NOTE  Date: 11/15/2021  Patient Name: Antoinette Tsai      Room: -  MRN: [id number]    : 1960  (62 y.o.) Gender: female     Discharge Recommendations:  Further Occupational Therapy is recommended upon facility discharge. Equipment Needed: Yes (TBD)    Referring Practitioner: Soumya Horn MD  Diagnosis: Stroke-like symptoms  General  Chart Reviewed: Yes  Patient assessed for rehabilitation services?: Yes  Response to previous treatment: Patient with no complaints from previous session  Family / Caregiver Present: No  Referring Practitioner: Soumya Horn MD  Diagnosis: Stroke-like symptoms    Restrictions  Restrictions/Precautions: Contact Precautions, Fall Risk  Other position/activity restrictions: Hemodialysis MWF  Required Braces or Orthoses  Right Lower Extremity Brace:  (Lymphedema wraps)  Left Lower Extremity Brace:  (Lymphedema wraps)  Required Braces or Orthoses?: Yes      Subjective  Subjective: Pt reports  Comments: Pt is pleasant. In semifolwlers completing cross word puzzle with moderately built up pen, elbow resting on tabletop, not tremors noted. Pt anticipating MRI then HD today; able to complete brief therapy session. This writer providing built up handles and therapy sponges for hand condiitoning and tremor management (c/o but writer did not observe). Pt able engage in functional mobility and recall 2 separate areas distanced apart to retrieve items for therpaeutic dynamic reaching. She was assisted to w/c for transport to [facility] upon treatment completion.   Patient Currently in Pain: No        Pain Assessment  Response to Pain Intervention: Patient Satisfied    Objective     Bed mobility  Rolling to Left: Stand by assistance  Rolling to Right: Stand by assistance  Supine to Sit: Stand by assistance  Sit to Supine: Unable to assess  Scooting: Stand by assistance  Comment: seated modified figure 4 trialling soccks prior to fully edge of bed. Balance  Sitting Balance: Stand by assistance  Standing Balance: Stand by assistance  Standing Balance  Time: 2minutes  Activity: Static stand>ambulation>item retrieval at RW level for dynamic reaching and balance  Functional Mobility  Functional - Mobility Device: Rolling Walker  Activity: Other (exitting room>hallway with item retrival>w/c)  Assist Level: Stand by assistance  Functional Mobility Comments: with RW. G pace. No LOB. F+ ability to reaching shoulder height into cupbard as well as hip height withing base of support. ADL  Feeding: Stand by assistance  Grooming: Stand by assistance  UE Bathing: Minimal assistance  LE Bathing: Moderate assistance  UE Dressing: Minimal assistance  LE Dressing: Moderate assistance  Toileting: Minimal assistance  Additional Comments: ADL scores based on clincal reasoning and skilled observation. Pt completed toileting task with min A while standing. Pt currently limited due to decreased motor planning, strength, balance, activity tolerance, and cognitive barriers impacting safety and independence with self care tasks. Trialled foot management while seated EOB using modified figure four with poor outcome; continue to asses additional compensatory positioning stategies while PT assist with LB ROM/task tolerance to return to baseline techinques. Pt reports baseline utilized lympedema sleeves BLE, not present during this session; writer encouraging to advocate for nursing to identify appropriate alteranative strategies for lymphedema mgt during stay. Additionally educated on skin checks due to decreased sensation in feet/socks pitting; recommending 2X-3X footies for LLE due to edema. Built up handles provided for tremor management PRN during ADL tasps requiring functional palmar grasp.      Transfers  Sit to stand: Stand by assistance  Stand to sit: Stand by assistance  Transfer Comments: G overall technique observed  Type of ROM/Therapeutic Exercise  Comment: Resistive therapy sponges provided for hand conditioning: low and moderate, Encouraged to resume her baseline HEP for hand conditioning. Appears fully cooperative and motivated. Assessment  Performance deficits / Impairments: Decreased ADL status; Decreased functional mobility ; Decreased ROM; Decreased strength; Decreased safe awareness; Decreased cognition; Decreased endurance; Decreased sensation; Decreased balance; Decreased high-level IADLs; Decreased fine motor control; Decreased coordination  Assessment: resume OT POC; demo memory and functional mobility; provide AE and HEP items. Prognosis: Good  Discharge Recommendations: Patient would benefit from continued therapy after discharge  Activity Tolerance: Patient Tolerated treatment well; Patient limited by fatigue; Treatment limited secondary to decreased cognition  Safety Devices in place: Yes  Type of devices: All fall risk precautions in place; Call light within reach; Chair alarm in place; Gait belt; Patient at risk for falls; Left in chair; Nurse notified  Equipment Recommendations  Equipment Needed: Yes (TBD)  Other: built up handles for tremor management and improve tolerance to functional use of RUE          Patient Education:   OT POC, noted above  Learner:patient  Method: demonstration and explanation       Outcome: demonstrated understanding     Plan  Safety Devices  Safety Devices in place: Yes  Type of devices:  All fall risk precautions in place, Call light within reach, Chair alarm in place, Gait belt, Patient at risk for falls, Left in chair, Nurse notified  Plan  Times per week: 5-7  Current Treatment Recommendations: Self-Care / ADL, Strengthening, ROM, Balance Training, Functional Mobility Training, Endurance Training, Cognitive Reorientation, Safety Education & Training, Patient/Caregiver Education & Training, Equipment Evaluation, Education, & procurement, Home Management Training, Cognitive/Perceptual Training      Goals  Short term goals  Time Frame for Short term goals: By discharge  Short term goal 1: Pt will complete lower body dressing/bathing with CGA and good safety with use fo AE as needed (Goal updated 11/12 Becca Ali OTR/L)  Short term goal 2:  Pt will complete upper body dressing/bathing with set-up A and good attention to task (Goal updated 11/12 Becca Ali OTR/L)  Short term goal 3:  Pt will complete functional transfers/mobility during self care tasks with SBA and good safety with use of least restrictive device (Goal updated 11/12 Becca Ali OTR/L)  Short term goal 4: Pt will tolerate standing 3+ minutes during functional activity of choice with min A and good safety 5  Short term goal 5: Pt will participate in 15+ minutes of therapeutic exercises/functional activities to increase safety and independence with self care and mobility    OT Individual Minutes  Time In: 0908  Time Out: Jose  Minutes: 15      Electronically signed by SYBIL Tavera on 11/15/21 at 9:58 AM EST

## 2021-11-15 NOTE — PROGRESS NOTES
Lower Extremity Brace:  (Lymphedema wraps)  Left Lower Extremity Brace:  (Lymphedema wraps)  Position Activity Restriction  Other position/activity restrictions: Hemodialysis MWF  Subjective   General  Chart Reviewed: Yes  Additional Pertinent Hx: This is a 59 y.o. female with a significant past medical history of Chronic atrial fibrillation on Eliquis, Type 2 diabetes mellitus, essential systemic hypertension, coronary artery disease s/p CABG in 2004 and PTCA in 2019, heart failure with preserved ejection fraction LVEF 55% and end-stage renal disease secondary to diabetic and hypertensive nephropathy on routine hemodialysis on a Monday/Wednesday/Friday schedule at [address] [place] [address] dialysis unit on [facility] under the care of Dr. Bernardino Pickett since August 2001 using IJ tunneled dialysis catheter, who presented to the hospital for further evaluation of acute on chronic right-sided weakness, Slurred speech and confusion. She was initially diagnosed with acute/subacute stroke in the left frontal lobe with right-sided weakness 4 months ago. Neurology consulted , MRI brain pending. Response To Previous Treatment: Patient with no complaints from previous session. Family / Caregiver Present: No  Referring Practitioner: Dr Primo Bustillo: Patient is sitting up in bed completing  crossword puzzle with right hand. Agreeable to therapy   General Comment  Comments: DEDE Schuster approved therapy; co-treat with Domenico GARCIA   Pain Screening  Patient Currently in Pain: Denies  Vital Signs  Patient Currently in Pain: Denies       Orientation  Orientation  Overall Orientation Status: Within Functional Limits  Objective   Bed mobility  Rolling to Left: Stand by assistance  Supine to Sit: Stand by assistance  Scooting: Stand by assistance  Transfers  Sit to Stand: Stand by assistance  Stand to sit: Stand by assistance  Bed to Chair: Stand by assistance  Ambulation  Ambulation?: Yes  Ambulation 1  Surface: level tile  Device: [address] [facility]: Stand by assistance  Quality of Gait: right LE clearing floor however no heel/toe gait, minimal knee flexion, wide ELISABET, very slow pace  Gait Deviations: Decreased step length; Decreased step height; Slow Annie; Increased ELISABET; Deviated path  Distance: 25ft (distance limited d/t transport arriving to take patient to M)  Comments: Patient able to let go of RW with 1 UE to reach for items intermittently while ambualting. No LOB noted   Stairs/Curb  Stairs?: No        Other exercises  Other exercises?: Yes  Other exercises 1: bed mobility   Other exercises 2: donning socks at EOB   Other exercises 3: STS x1  Other exercises 4: standing dynamic balance (reaching out of ELISABET)      Goals  Short term goals  Time Frame for Short term goals: 7 to 8 visits  Short term goal 1: Pt able to perform supine>sit at [address] term goal 2: Pt able to perform sit to supine min A   Short term goal 3: Pt able to perform sit to stand and pivot transfers with rolling walker, min ax 2`  Short term goal 4: Pt able to ambulate with rolling walker distance of 20 ft x 2, min A x 2.   Short term goal 5: Improve R LE strengthto 2 to 2+/5 to improve fucntion  Patient Goals   Patient goals : Can I come to [address] [place] [address] per week: 5 to 7 x/week  Specific instructions for Next Treatment: co-tx with GARCIA/OTR  Current Treatment Recommendations: Strengthening, Balance Training, Functional Mobility Training, Transfer Training, Endurance Training, Gait Training, Home Exercise Program, Safety Education & Training, Patient/Caregiver Education & Training  Safety Devices  Type of devices: Call light within reach, Gait belt, Left in bed        11/15/21 0948   PT Individual Minutes   Time In 0908   Time Out 0923   Minutes 15     Electronically signed by Haile Horn PTA on 11/15/21 at 9:49 AM EST

## 2021-11-16 LAB
GLUCOSE BLD-MCNC: 280 MG/DL (ref 65–105)
GLUCOSE BLD-MCNC: 292 MG/DL (ref 65–105)
GLUCOSE BLD-MCNC: 301 MG/DL (ref 65–105)
GLUCOSE BLD-MCNC: 303 MG/DL (ref 65–105)

## 2021-11-16 PROCEDURE — 1180000000 HC REHAB R&B

## 2021-11-16 PROCEDURE — 2580000003 HC RX 258: Performed by: FAMILY MEDICINE

## 2021-11-16 PROCEDURE — 97110 THERAPEUTIC EXERCISES: CPT

## 2021-11-16 PROCEDURE — 6370000000 HC RX 637 (ALT 250 FOR IP): Performed by: STUDENT IN AN ORGANIZED HEALTH CARE EDUCATION/TRAINING PROGRAM

## 2021-11-16 PROCEDURE — 97530 THERAPEUTIC ACTIVITIES: CPT

## 2021-11-16 PROCEDURE — 6370000000 HC RX 637 (ALT 250 FOR IP): Performed by: FAMILY MEDICINE

## 2021-11-16 PROCEDURE — 99223 1ST HOSP IP/OBS HIGH 75: CPT | Performed by: STUDENT IN AN ORGANIZED HEALTH CARE EDUCATION/TRAINING PROGRAM

## 2021-11-16 PROCEDURE — 6370000000 HC RX 637 (ALT 250 FOR IP): Performed by: INTERNAL MEDICINE

## 2021-11-16 PROCEDURE — 97535 SELF CARE MNGMENT TRAINING: CPT

## 2021-11-16 PROCEDURE — 97166 OT EVAL MOD COMPLEX 45 MIN: CPT

## 2021-11-16 PROCEDURE — 97162 PT EVAL MOD COMPLEX 30 MIN: CPT

## 2021-11-16 PROCEDURE — 97116 GAIT TRAINING THERAPY: CPT

## 2021-11-16 PROCEDURE — 99254 IP/OBS CNSLTJ NEW/EST MOD 60: CPT | Performed by: INTERNAL MEDICINE

## 2021-11-16 PROCEDURE — 92523 SPEECH SOUND LANG COMPREHEN: CPT

## 2021-11-16 PROCEDURE — 82947 ASSAY GLUCOSE BLOOD QUANT: CPT

## 2021-11-16 RX ADMIN — FUROSEMIDE 80 MG: 40 TABLET ORAL at 08:03

## 2021-11-16 RX ADMIN — ACETAMINOPHEN 650 MG: 325 TABLET ORAL at 10:24

## 2021-11-16 RX ADMIN — RANOLAZINE 1000 MG: 1000 TABLET, FILM COATED, EXTENDED RELEASE ORAL at 20:46

## 2021-11-16 RX ADMIN — TAMSULOSIN HYDROCHLORIDE 0.4 MG: 0.4 CAPSULE ORAL at 08:03

## 2021-11-16 RX ADMIN — INSULIN LISPRO 12 UNITS: 100 INJECTION, SOLUTION INTRAVENOUS; SUBCUTANEOUS at 08:01

## 2021-11-16 RX ADMIN — INSULIN GLARGINE 65 UNITS: 100 INJECTION, SOLUTION SUBCUTANEOUS at 20:43

## 2021-11-16 RX ADMIN — GABAPENTIN 300 MG: 300 CAPSULE ORAL at 08:03

## 2021-11-16 RX ADMIN — PANTOPRAZOLE SODIUM 40 MG: 40 TABLET, DELAYED RELEASE ORAL at 06:01

## 2021-11-16 RX ADMIN — BUSPIRONE HYDROCHLORIDE 7.5 MG: 5 TABLET ORAL at 20:45

## 2021-11-16 RX ADMIN — INSULIN LISPRO 10 UNITS: 100 INJECTION, SOLUTION INTRAVENOUS; SUBCUTANEOUS at 17:04

## 2021-11-16 RX ADMIN — DOCUSATE SODIUM 100 MG: 100 CAPSULE ORAL at 08:03

## 2021-11-16 RX ADMIN — CARVEDILOL 6.25 MG: 6.25 TABLET, FILM COATED ORAL at 20:45

## 2021-11-16 RX ADMIN — APIXABAN 5 MG: 5 TABLET, FILM COATED ORAL at 08:02

## 2021-11-16 RX ADMIN — INSULIN LISPRO 12 UNITS: 100 INJECTION, SOLUTION INTRAVENOUS; SUBCUTANEOUS at 11:51

## 2021-11-16 RX ADMIN — Medication 10 ML: at 08:11

## 2021-11-16 RX ADMIN — INSULIN GLARGINE 65 UNITS: 100 INJECTION, SOLUTION SUBCUTANEOUS at 08:02

## 2021-11-16 RX ADMIN — FEBUXOSTAT 40 MG: 40 TABLET, FILM COATED ORAL at 13:48

## 2021-11-16 RX ADMIN — CARVEDILOL 6.25 MG: 6.25 TABLET, FILM COATED ORAL at 08:02

## 2021-11-16 RX ADMIN — DOCUSATE SODIUM 100 MG: 100 CAPSULE ORAL at 20:46

## 2021-11-16 RX ADMIN — FUROSEMIDE 80 MG: 40 TABLET ORAL at 17:07

## 2021-11-16 RX ADMIN — BUSPIRONE HYDROCHLORIDE 7.5 MG: 5 TABLET ORAL at 08:02

## 2021-11-16 RX ADMIN — INSULIN LISPRO 9 UNITS: 100 INJECTION, SOLUTION INTRAVENOUS; SUBCUTANEOUS at 17:04

## 2021-11-16 RX ADMIN — RANOLAZINE 1000 MG: 1000 TABLET, FILM COATED, EXTENDED RELEASE ORAL at 08:02

## 2021-11-16 RX ADMIN — ASPIRIN 81 MG: 81 TABLET, CHEWABLE ORAL at 08:03

## 2021-11-16 RX ADMIN — APIXABAN 5 MG: 5 TABLET, FILM COATED ORAL at 20:46

## 2021-11-16 RX ADMIN — INSULIN LISPRO 5 UNITS: 100 INJECTION, SOLUTION INTRAVENOUS; SUBCUTANEOUS at 20:44

## 2021-11-16 RX ADMIN — ATORVASTATIN CALCIUM 80 MG: 80 TABLET, FILM COATED ORAL at 08:03

## 2021-11-16 RX ADMIN — GABAPENTIN 300 MG: 300 CAPSULE ORAL at 20:46

## 2021-11-16 RX ADMIN — BUSPIRONE HYDROCHLORIDE 7.5 MG: 5 TABLET ORAL at 13:48

## 2021-11-16 RX ADMIN — TRAZODONE HYDROCHLORIDE 75 MG: 50 TABLET ORAL at 20:45

## 2021-11-16 ASSESSMENT — 9 HOLE PEG TEST
TESTTIME_SECONDS: 32.4
TEST_RESULT: IMPAIRED
TEST_RESULT: IMPAIRED
TESTTIME_SECONDS: 29.6

## 2021-11-16 ASSESSMENT — PAIN SCALES - GENERAL
PAINLEVEL_OUTOF10: 0
PAINLEVEL_OUTOF10: 0
PAINLEVEL_OUTOF10: 8
PAINLEVEL_OUTOF10: 6
PAINLEVEL_OUTOF10: 8
PAINLEVEL_OUTOF10: 0

## 2021-11-16 ASSESSMENT — ENCOUNTER SYMPTOMS
DOUBLE VISION: 0
ABDOMINAL PAIN: 0
BLURRED VISION: 0
SHORTNESS OF BREATH: 0

## 2021-11-16 ASSESSMENT — PAIN DESCRIPTION - FREQUENCY: FREQUENCY: INTERMITTENT

## 2021-11-16 ASSESSMENT — PAIN DESCRIPTION - LOCATION: LOCATION: NECK

## 2021-11-16 ASSESSMENT — PAIN DESCRIPTION - DESCRIPTORS: DESCRIPTORS: ACHING

## 2021-11-16 ASSESSMENT — PAIN DESCRIPTION - ONSET: ONSET: ON-GOING

## 2021-11-16 ASSESSMENT — PAIN DESCRIPTION - PAIN TYPE: TYPE: ACUTE PAIN

## 2021-11-16 ASSESSMENT — PAIN DESCRIPTION - ORIENTATION: ORIENTATION: MID;POSTERIOR

## 2021-11-16 NOTE — CONSULTS
[facility]    CONSULTATION / HISTORY AND PHYSICAL EXAMINATION            Date:   11/16/2021  Patient name:  Hayden Trevizo  Date of admission:  11/15/2021  7:37 PM  MRN:   [id number]  Account:  [de-identified]  YOB: 1960  PCP:    AFSANEH Lynn CNP  Room:   2050/2050-01  Code Status:    Full Code    Physician Requesting Consult: Maya Sanchez MD    Reason for Consult:  medical management    Chief Complaint:     No chief complaint on file. History Obtained From:     Patient medical record nursing staff    History of Present Illness:   59 F with hx of CVA, acute kidney injury on chronic renal disease requiring hemodialysis, has a tunneled catheter at this time possibly will get AV fistula soon, diastolic CHF, hypertension, type 2 diabetes, uncontrolled sugars, on insulin, admitted to [facility] acute rehab for physical and Occupational Therapy. This is her third admission in the rehab, was admitted with altered mental status and right-sided weakness at the dialysis, which mostly resolved with some residual right-sided weakness. She had a previous 2 ischemic strokes. Seen by neurology, had MRI done on this admission as well which did not show any new acute stroke and EEG was normal, psychiatry was consulted as well and advised to increase BuSpar.   We are consulted for medical management      Past Medical History:     Past Medical History:   Diagnosis Date    Backache, unspecified     CHF (congestive heart failure) (HCC)     Chronic kidney disease     Coronary atherosclerosis of artery bypass graft     Cramp of limb     Gallstones     Hypertension     Insomnia     Pneumonia     Type II or unspecified type diabetes mellitus with renal manifestations, not stated as uncontrolled(250.40)     Type II or unspecified type diabetes mellitus without mention of complication, not stated as uncontrolled     Unspecified vitamin D deficiency         Past Surgical History:     Past Surgical History:   Procedure Laterality Date    ANKLE FRACTURE SURGERY      BREAST SURGERY      CARPAL TUNNEL RELEASE      CHOLECYSTECTOMY, OPEN N/A     CORONARY ARTERY BYPASS GRAFT      x3    HAND SURGERY      IR TUNNELED CATHETER PLACEMENT GREATER THAN 5 YEARS  8/18/2021    IR TUNNELED CATHETER PLACEMENT GREATER THAN 5 YEARS 8/18/2021 [facility]    KNEE ARTHROSCOPY      right    TONSILLECTOMY          Medications Prior to Admission:     Prior to Admission medications    Medication Sig Start Date End Date Taking? Authorizing Provider   atorvastatin (LIPITOR) 80 MG tablet Take 1 tablet by mouth daily 11/3/21  Yes AFSANEH Daly CNP   aspirin 81 MG chewable tablet Take 1 tablet by mouth daily 9/25/21  Yes Christine Ovalle MD   busPIRone (BUSPAR) 7.5 MG tablet Take 1 tablet by mouth 3 times daily 9/25/21  Yes Christine Ovalle MD   ELIQUIS 5 MG TABS tablet Take 1 tablet by mouth 2 times daily 9/25/21  Yes Christine Ovalle MD   pantoprazole (PROTONIX) 40 MG tablet Take 1 tablet by mouth every morning (before breakfast) 9/25/21  Yes Christine Ovalle MD   acetaminophen (TYLENOL) 325 MG tablet Take 2 tablets by mouth every 4 hours as needed for Pain 8/25/21  Yes Christine Ovalle MD   tamsulosin [facility]) 0.4 MG capsule Take 1 capsule by mouth daily 11/10/21   AFSANEH Daly CNP   ranolazine (RANEXA) 1000 MG extended release tablet Take 1 tablet by mouth 2 times daily 9/25/21   Christine Ovalle MD   gabapentin (NEURONTIN) 300 MG capsule Take 1 capsule by mouth 2 times daily for 30 days.  9/25/21 11/10/21  Christine Ovalle MD   traZODone (DESYREL) 50 MG tablet Take 1.5 tablets by mouth nightly 9/25/21   Christine Ovalle MD   insulin glargine [facility]) 100 UNIT/ML injection pen Inject 53 Units into the skin 2 times daily 9/25/21   Christine Ovalle MD   carvedilol (COREG) 6.25 MG tablet Take 1 tablet by mouth 2 times daily 9/25/21 Jhonathan Nobles MD   furosemide (LASIX) 80 MG tablet Take 1 tablet by mouth 2 times daily 9/25/21   Jhonathan Nobles MD   febuxostat (ULORIC) 40 MG TABS tablet Take 1 tablet by mouth daily 9/25/21   Jhonathan Nobles MD   Insulin Pen Needle (BD ULTRA-FINE PEN NEEDLES) 29G X 12.7MM MISC Use one needle for each insulin injection. 9/25/21   Jhonathan Nobles MD   insulin lispro, 1 Unit Dial, (HUMALOG KWIKPEN) 100 UNIT/ML SOPN Per sliding scale:  If <139  No Insulin; 140-199  2 Units; 200-249  4 Units; 250-299  6 Units; 300-349  8 Units; 350-400  10 Units; Above 400  12 Units 9/25/21   Jhonathan Nobles MD   blood glucose test strips (DEN CONTOUR TEST) strip 1 each by In Vitro route 3 times daily 8/25/21   Jhonathan Nobles MD   ferrous sulfate (BRIAN-ARCHIE) 325 (65 Fe) MG tablet Take 1 tablet by mouth 2 times daily (with meals) 8/25/21   Jhonathan Nobles MD   senna (SENOKOT) 8.6 MG tablet Take 2 tablets by mouth daily as needed (Constipation) 8/25/21   Jhonathan Nobles MD   Alcohol Swabs 70 % PADS Use an alcohol swab to cleanse the skin before checking your blood sugar and before each insulin injection. 8/25/21   Jhonathan Nobles MD   sharps container 1 each by Does not apply route as needed (dirty pen needles) 4/19/21   AFSANEH Loya CNP   allopurinol (ZYLOPRIM) 100 MG tablet Take 1 tablet by mouth daily 4/14/21   AFSANEH Loya CNP        Allergies:     Adhesive tape, Ace inhibitors, Iv dye [iodides], and Metformin and related    Social History:     Tobacco:    reports that she has never smoked. She has never used smokeless tobacco.  Alcohol:      reports no history of alcohol use. Drug Use:  reports no history of drug use. Family History:     Family History   Problem Relation Age of Onset    Diabetes Father     Heart Failure Father        Review of Systems:     Positive and Negative as described in HPI.     CONSTITUTIONAL:  negative for fevers, chills, sweats, fatigue, weight loss  HEENT:  negative for vision, hearing changes, runny nose, throat pain  RESPIRATORY:  negative for shortness of breath, cough, congestion, wheezing. CARDIOVASCULAR:  negative for chest pain, palpitations. GASTROINTESTINAL:  negative for nausea, vomiting, diarrhea, constipation, change in bowel habits, abdominal pain   GENITOURINARY:  negative for difficulty of urination, burning with urination, frequency   INTEGUMENT:  negative for rash, skin lesions, easy bruising   HEMATOLOGIC/LYMPHATIC:  negative for swelling/edema   ALLERGIC/IMMUNOLOGIC:  negative for urticaria , itching  ENDOCRINE:  negative increase in drinking, increase in urination, hot or cold intolerance  MUSCULOSKELETAL:  negative joint pains, muscle aches, swelling of joints  NEUROLOGICAL: Positive for dizziness, sensory change and weakness. Negative for headaches. Psychiatric/Behavioral: Positive for memory loss. BEHAVIOR/PSYCH:      Physical Exam:     /72   Pulse 70   Temp 97.9 °F (36.6 °C)   Resp 19   Ht 5' 5\" (1.651 m)   Wt 246 lb 7.6 oz (111.8 kg)   SpO2 93%   BMI 41.02 kg/m²   Temp (24hrs), Av.1 °F (36.7 °C), Min:97.9 °F (36.6 °C), Max:98.3 °F (36.8 °C)    Recent Labs     11/15/21  2020 21  0637 21  1050 21  1646   POCGLU 327* 301* 303* 292*     No intake or output data in the 24 hours ending 21 1844    General Appearance:  alert, well appearing, and in no acute distress  Mental status: oriented to person, place, and time with normal affect  Head:  normocephalic, atraumatic.   Eye: no icterus, redness, pupils equal and reactive, extraocular eye movements intact, conjunctiva clear  Ear: normal external ear, no discharge, hearing intact  Nose:  no drainage noted  Mouth: mucous membranes moist  Neck: supple, no carotid bruits, thyroid not palpable  Lungs: Bilateral equal air entry, clear to ausculation, no wheezing, rales or rhonchi, normal effort  Cardiovascular: normal rate, regular rhythm, no murmur, gallop, rub.  Abdomen: Soft, nontender, nondistended, normal bowel sounds, no hepatomegaly or splenomegaly  Neurologic: There are no new focal motor or sensory deficits, normal muscle tone and bulk, no abnormal sensation, normal speech, cranial nerves II through XII grossly intact, mild weakness on right LE  Skin: No gross lesions, rashes, bruising or bleeding on exposed skin area  Extremities:  peripheral pulses palpable, no pedal edema or calf pain with palpation  Psych:      Investigations:      Laboratory Testing:  Recent Results (from the past 24 hour(s))   POC Glucose Fingerstick    Collection Time: 11/15/21  8:20 PM   Result Value Ref Range    POC Glucose 327 (H) 65 - 105 mg/dL   POC Glucose Fingerstick    Collection Time: 11/16/21  6:37 AM   Result Value Ref Range    POC Glucose 301 (H) 65 - 105 mg/dL   POC Glucose Fingerstick    Collection Time: 11/16/21 10:50 AM   Result Value Ref Range    POC Glucose 303 (H) 65 - 105 mg/dL   POC Glucose Fingerstick    Collection Time: 11/16/21  4:46 PM   Result Value Ref Range    POC Glucose 292 (H) 65 - 105 mg/dL           Consultations:   IP CONSULT TO DIETITIAN  IP CONSULT TO SOCIAL WORK  IP CONSULT TO INTERNAL MEDICINE  IP CONSULT TO NEPHROLOGY  IP CONSULT TO INTERNAL MEDICINE  Assessment :      Primary Problem  Stroke-like symptoms    Active Hospital Problems    Diagnosis Date Noted    Debility [R53.81] 09/19/2021    Morbid obesity with BMI of 40.0-44.9, adult ([facility] [E66.01, Z68.41] 09/14/2021    Stroke-like symptoms [R29.90] 09/13/2021    Essential hypertension [I10] 05/03/2021    Anemia in stage 4 chronic kidney disease ([facility] [N18.4, D63.1] 05/03/2021    Iron deficiency anemia [D50.9] 08/31/2020    Diabetic polyneuropathy associated with type 2 diabetes mellitus ([facility] [E11.42] 02/17/2020    Stage 4 chronic kidney disease ([facility] [N18.4] 08/06/2019    Chronic diastolic heart failure ([facility] [I50.32] 09/20/2018    Type 2 diabetes mellitus with kidney complication, with long-term current use of insulin ([facility] [E11.29, Z79.4] 09/20/2018    Mixed hyperlipidemia [E78.2] 07/27/2016    Diabetes mellitus due to underlying condition with diabetic nephropathy, with long-term current use of insulin (HCC) [E08.21, Z79.4] 06/29/2015    Acute renal failure ([facility] [N17.9] 05/18/2015    Atherosclerosis of coronary artery bypass graft of native heart without angina pectoris [I25.810] 05/04/2015       Plan:     DM2, uncontrolled , On lantus 65 SC BID, ISS high dose, added premeal insulin 10 Units, Carb control diet   HTN, well controlled   HLD, home dose statins   Debility/CVA/ no new strokes on last MRI, PT/OT , deconditioning   Morbid obesity   DVt PPX           Renella Boeck, MD  11/16/2021  6:44 PM    Copy sent to Dr. Michelle Burks, APRN - CNP    Please note that this chart was generated using voice recognition Dragon dictation software. Although every effort was made to ensure the accuracy of this automated transcription, some errors in transcription may have occurred.

## 2021-11-16 NOTE — H&P
Physical Medicine & Rehabilitation History and Physical  [facility] Acute Rehabilitation Unit     Primary care provider:  AFSANEH Cope CNP     Chief Complaint and Reason for Rehabilitation Admission:   ADL and mobility deficits secondary to recrudescence of right-sided and cognitive stroke symptoms    History of Present Illness:  Jamel Clayton is a 59 y.o. right-handed female with history of CVA, AURELIA on CKD requiring hemodialysis, CHF, HTN, type 2 diabetes admitted to the [address] [place] [address] unit on 11/15/2021. She was originally admitted to [facility] on 11/10/21. She initially presented with an episode of altered mental status and right-sided weakness at dialysis. She also had acute memory loss. She is known from 2 previous acute rehab admissions after ischemic CVA and a similar episode of altered mental status. Neurology was consulted, and MRI brain showed no acute intracranial abnormalities. EEG was normal.  Psychiatry was consulted and recommended increase in buspar. She is currently requiring assistance for self-care activities and mobility prompting this admission. She continues to report right-sided weakness as well as numbness/tingling in the hands and feet. She denies any pain in the bilateral lower limbs. She feels like her memory/cognition has improved a little bit since admission to acute care. She notes some dizziness with standing. She denies any headache, changes in vision, and changes in bladder or bowel control. She states that she had an enema last night for constipation, which helped.     Premorbid function:  Independent    Current Function:  PT:  Restrictions/Precautions: Contact Precautions, Fall Risk  Implants present? :  (pt denies)  Other position/activity restrictions: Hemodialysis MWF  Required Braces or Orthoses  Right Lower Extremity Brace:  (lymphadema wraps)  Left Lower Extremity Brace:  (lymphadema wraps)   Transfers  Sit to Stand: Contact guard assistance  Stand to sit: Contact guard assistance  Bed to Chair: Contact guard assistance  Stand Pivot Transfers: Contact guard assistance  Comment: All transfers performed with RW and CGA for safety. Pt demos correct and safe hands placement. Ambulation 1  Surface: level tile  Device: Rollator  Assistance: Contact guard assistance  Quality of Gait: right LE clearing floor however no heel/toe gait, minimal knee flexion, very slow pace  Gait Deviations: Slow Annie, Decreased step length, Decreased step height  Distance: 8'x2, 34', 17', 5'  Comments: Pt ambulates within room and hallway with RW and CGA. Pt requiring multiple seated rest breaks after each walking distance d/t inc neck pain with mobility and inc sensation of heaviness/muscle fatigue in RLE. Cues to inc focus on Heel strike/pushoff on RLE; pt with fair carry over demo. Transfers  Sit to Stand: Contact guard assistance  Stand to sit: Contact guard assistance  Bed to Chair: Contact guard assistance  Stand Pivot Transfers: Contact guard assistance  Comment: All transfers performed with RW and CGA for safety. Pt demos correct and safe hands placement. Ambulation  Ambulation?: Yes  More Ambulation?: Yes  Ambulation 1  Surface: level tile  Device: Rollator  Assistance: Contact guard assistance  Quality of Gait: right LE clearing floor however no heel/toe gait, minimal knee flexion, very slow pace  Gait Deviations: Slow Annie, Decreased step length, Decreased step height  Distance: 8'x2, 34', 17', 5'  Comments: Pt ambulates within room and hallway with RW and CGA. Pt requiring multiple seated rest breaks after each walking distance d/t inc neck pain with mobility and inc sensation of heaviness/muscle fatigue in RLE. Cues to inc focus on Heel strike/pushoff on RLE; pt with fair carry over demo.     Surface: level tile  Ambulation 1  Surface: level tile  Device: Rollator  Assistance: Contact guard assistance  Quality of Gait: right LE clearing floor however no heel/toe gait, minimal knee flexion, very slow pace  Gait Deviations: Slow Annie, Decreased step length, Decreased step height  Distance: 8'x2, 34', 17', 5'  Comments: Pt ambulates within room and hallway with RW and CGA. Pt requiring multiple seated rest breaks after each walking distance d/t inc neck pain with mobility and inc sensation of heaviness/muscle fatigue in RLE. Cues to inc focus on Heel strike/pushoff on RLE; pt with fair carry over demo. OT:   ADL  UE Bathing: Stand by assistance  LE Bathing: Moderate assistance (A with B feet and bottom/stalin-area)  UE Dressing: Contact guard assistance (A with buttons)  Toileting: Minimal assistance  Additional Comments: OT facilitated pts engagement in full shower on this date. Pt required assistance with bilateral feet and bottom/periarea hygiene. Pt completed toileting task with assistance with hygiene after bowel movement. Balance  Sitting Balance: Stand by assistance  Standing Balance: Contact guard assistance      Functional Mobility  Functional - Mobility Device: Rolling Walker  Activity: To/from bathroom  Assist Level: Contact guard assistance  Functional Mobility Comments: Verbal cues for hand placement and safety     Bed mobility  Bridging: Stand by assistance  Rolling to Left: Stand by assistance  Rolling to Right: Stand by assistance  Supine to Sit: Stand by assistance  Sit to Supine: Stand by assistance (Assist level reported by Speech Therapist)  Scooting: Stand by assistance  Comment: Pt performs bed mobility with HOB slightly elevated and minimal use of bed rails.  pt c/o mild dizziness upon initially sitting upright;  Transfers  Sit to stand: Contact guard assistance  Stand to sit: Contact guard assistance  Transfer Comments: Verbal cues for hand placement and safety   Toilet Transfers  Toilet - Technique: Ambulating  Equipment Used: Grab bars  Toilet Transfer: Contact guard assistance  Toilet Transfers Comments: Verbal cues for hand placement and safety     Shower Transfers  Shower - Transfer To: Shower seat with back  Shower - Technique: Ambulating  Shower Transfers: Minimal assistance  Shower Transfers Comments: OT assisted pt in backing up to shower with RW and utilizing grab bars to assist with back steping over threshold and sitting on shower chair. Pt demonstrated good safety with increased time to complete shower transfer. SPEECH:  Assessment:  Diagnosis: Cognitive impairment. Pt. demonstrated functional speech and language with mildly impaired thought organization (long response latency, thought flexibility), long term recall/delayed recall, and working memory.  Pt also demonstrated difficulty with spelling tasks and reports spelling issues when texting loved ones since admission (agraphia/alexia?)    Past Medical History:      Diagnosis Date    Backache, unspecified     CHF (congestive heart failure) (HCC)     Chronic kidney disease     Coronary atherosclerosis of artery bypass graft     Cramp of limb     Gallstones     Hypertension     Insomnia     Pneumonia     Type II or unspecified type diabetes mellitus with renal manifestations, not stated as uncontrolled(250.40)     Type II or unspecified type diabetes mellitus without mention of complication, not stated as uncontrolled     Unspecified vitamin D deficiency        Past Surgical History:      Procedure Laterality Date    ANKLE FRACTURE SURGERY      BREAST SURGERY      CARPAL TUNNEL RELEASE      CHOLECYSTECTOMY, OPEN N/A     CORONARY ARTERY BYPASS GRAFT      x3    HAND SURGERY      IR TUNNELED CATHETER PLACEMENT GREATER THAN 5 YEARS  8/18/2021    IR TUNNELED CATHETER PLACEMENT GREATER THAN 5 YEARS 8/18/2021 [facility] PROCED[facility]    KNEE ARTHROSCOPY      right    TONSILLECTOMY         Allergies:    Adhesive tape, Ace inhibitors, Iv dye [iodides], and Metformin and related    Medications  Scheduled Meds:    insulin lispro  10 Units SubCUTAneous TID WC    sodium chloride flush  5-40 mL IntraVENous 2 times per day    apixaban  5 mg Oral BID    aspirin  81 mg Oral Daily    atorvastatin  80 mg Oral Daily    busPIRone  7.5 mg Oral TID    carvedilol  6.25 mg Oral BID    docusate sodium  100 mg Oral BID    febuxostat  40 mg Oral Daily    ferrous sulfate  325 mg Oral BID WC    furosemide  80 mg Oral BID    gabapentin  300 mg Oral BID    insulin glargine  65 Units SubCUTAneous BID    insulin lispro  0-18 Units SubCUTAneous TID WC    insulin lispro  0-9 Units SubCUTAneous Nightly    pantoprazole  40 mg Oral QAM AC    psyllium  1 packet Oral Daily    ranolazine  1,000 mg Oral BID    tamsulosin  0.4 mg Oral Daily    traZODone  75 mg Oral Nightly     Continuous Infusions:    sodium chloride      dextrose       PRN Meds: sodium chloride, acetaminophen, ondansetron **OR** [DISCONTINUED] ondansetron, dextrose, dextrose, glucagon (rDNA), glucose, lactulose, sodium chloride flush, acetaminophen, senna, bisacodyl     Family History:       Problem Relation Age of Onset    Diabetes Father     Heart Failure Father        Social History:  Lives With: Family (Mother)  Type of Home:  (Condo)  Home Layout: One level  Home Access: Ramped entrance  Bathroom Shower/Tub: Walk-in shower, Doors (Threshold to step over)  Bathroom Toilet: Handicap height  Bathroom Equipment: Grab bars in shower, Built-in shower seat (Uses wall of shower to get off toilet)  Bathroom Accessibility: Walker accessible  Home Equipment: 4 wheeled walker, Cane, Sock aid (Has a recliner at home)  ADL Assistance: Independent  Homemaking Assistance: Needs assistance (sisters assist )  Ambulation Assistance: Independent (1SR )  Transfer Assistance: Independent  Active : No  Patient's  Info: family-sister  Mode of Transportation: Car  IADL Comments: Pt sleeps in flat bed at home.    Additional Comments: Pt recently discharged from [address] [place] [address] at [facility]  on 8/26/21 and again on 9-26-21. Pt was going to dialysis 3 times per week. Pt is demonstrating some confusion/memory deficits and above information taken from previous therapy eval 11/11/21. Pt able to identify that her mother is still living with her and her sisters assist with caring for her mother. Social History     Socioeconomic History    Marital status: Single     Spouse name: Not on file    Number of children: Not on file    Years of education: Not on file    Highest education level: Not on file   Occupational History    Not on file   Tobacco Use    Smoking status: Never Smoker    Smokeless tobacco: Never Used   Vaping Use    Vaping Use: Never used   Substance and Sexual Activity    Alcohol use: No    Drug use: No    Sexual activity: Not Currently   Other Topics Concern    Not on file   Social History Narrative    Not on file     Social Determinants of Health     Financial Resource Strain: Low Risk     Difficulty of Paying Living Expenses: Not hard at all   Food Insecurity:     Worried About [address] in the Last Year: Not on file    [address] in the Last Year: Not on file   Transportation Needs:     Lack of Transportation (Medical): Not on file    Lack of Transportation (Non-Medical):  Not on file   Physical Activity:     Days of Exercise per Week: Not on file    Minutes of Exercise per Session: Not on file   Stress:     Feeling of Stress : Not on file   Social Connections:     Frequency of Communication with Friends and Family: Not on file    Frequency of Social Gatherings with Friends and Family: Not on file    Attends Sikhism Services: Not on file    Active Member of Clubs or Organizations: Not on file    Attends Club or Organization Meetings: Not on file    Marital Status: Not on file   Intimate Partner Violence:     Fear of Current or Ex-Partner: Not on file    Emotionally Abused: Not on file    Physically Abused: Not on file   Gopi Pham Sexually Abused: Not on file   Housing Stability:     Unable to Pay for Housing in the Last Year: Not on file    Number of Places Lived in the Last Year: Not on file    Unstable Housing in the Last Year: Not on file         Review of Systems:  Review of Systems   Constitutional: Positive for malaise/fatigue. Negative for fever. HENT: Negative for hearing loss. Eyes: Negative for blurred vision and double vision. Respiratory: Negative for shortness of breath. Cardiovascular: Negative for chest pain. Gastrointestinal: Negative for abdominal pain. No change in bowel control. Genitourinary:        No change in bladder control   Musculoskeletal:        No lower limb pain   Skin: Negative for rash. Neurological: Positive for dizziness, sensory change and weakness. Negative for headaches. Psychiatric/Behavioral: Positive for memory loss. Physical Exam:  /72   Pulse 70   Temp 97.9 °F (36.6 °C)   Resp 19   Ht 5' 5\" (1.651 m)   Wt 246 lb 7.6 oz (111.8 kg)   SpO2 93%   BMI 41.02 kg/m²     GEN: Well developed, well nourished, no acute distress  HEENT: NCAT. EOMI. Hearing grossly intact. Mucous membranes pink and moist.  RESP: Normal breath sounds with no wheezing, rales, or rhonchi. Respirations WNL and unlabored. CV: Regular rate and rhythm. No murmurs, rubs, or gallops. ABD: Soft, non-distended, BS+ and equal.  NEURO: Alert but appears tired. She does recognize/remember this examiner. Speech fluent. No facial droop. Symmetrical shoulder shrug. Midline tongue protrusion. Sensation to light touch decreased in right upper and lower limbs compared to the left. MSK:  Muscle tone and bulk are normal bilaterally. Strength 4-/5 in right upper limb. Strength 4+/5 in left upper limb. Strength 4+/5 with bilateral ankle dorsiflexion and plantarflexion. LIMBS: Edema present in bilateral lower limbs. SKIN: Warm and dry with good turgor. PSYCH: Mood WNL. Affect WNL.  Appropriately interactive. Diagnostics:     CBC:   Recent Labs     11/15/21  0847   WBC 5.6   RBC 3.53*   HGB 11.8*   HCT 34.0*   MCV 96.4   RDW 14.9        BMP:   Recent Labs     11/13/21  2113 11/15/21  0847   NA  --  133*   K  --  4.9   CL  --  99   CO2  --  24   BUN  --  49*   CREATININE  --  3.20*   GLUCOSE 565* 365*     HbA1c:   Lab Results   Component Value Date    LABA1C 8.9 (H) 03/15/2021     BNP: No results for input(s): BNP in the last 72 hours. PT/INR: No results for input(s): PROTIME, INR in the last 72 hours. APTT: No results for input(s): APTT in the last 72 hours. CARDIAC ENZYMES: No results for input(s): CKMB, CKMBINDEX, TROPONINT in the last 72 hours. Invalid input(s): CKTOTAL;3  FASTING LIPID PANEL:  Lab Results   Component Value Date    CHOL 105 04/09/2015    HDL 43 04/09/2015    TRIG 168 04/09/2015     LIVER PROFILE: No results for input(s): AST, ALT, ALB, BILIDIR, BILITOT, ALKPHOS in the last 72 hours. Imaging:  MRI brain, 11/11/21:  Impression       Stable study. Rafaela Freeman is no acute infarction, intracranial hemorrhage, or   intracranial mass lesion.       Mild chronic microangiopathic ischemic disease.       Mild generalized volume loss.       Chronic encephalomalacia involving the left occipital lobe. MRI cervical spine, 11/15/21:  Impression   Prior C6 corpectomy and anterior fixation from C5-C7 without evidence for   complication.       Multilevel degenerative change with mild canal stenosis at C3-4 and bilateral   foraminal narrowing. Principal Diagnosis/plan:  The patient is a 59y.o.-year-old with ADL and mobility deficits secondary to recrudescence of right-sided and cognitive stroke symptoms. She will require close medical monitoring for the comorbidities listed below. She will benefit from intensive interdisciplinary therapies and rehab nursing care and is appropriate for inpatient rehabilitation.   The post admission physician evaluation (ANALISA) is consistent

## 2021-11-16 NOTE — PLAN OF CARE
Nutrition Problem #1: Altered nutrition-related lab values  Intervention: Food and/or Nutrient Delivery: Modify Current Diet  Nutritional Goals: PO intake % of most meals

## 2021-11-16 NOTE — PLAN OF CARE
Problem: Skin Integrity:  Goal: Will show no infection signs and symptoms  Description: Will show no infection signs and symptoms  11/16/2021 0842 by Yodit Coe RN  Outcome: Ongoing  11/16/2021 0231 by Teri Hernandez RN  Outcome: Ongoing  Goal: Absence of new skin breakdown  Description: Absence of new skin breakdown  Outcome: Ongoing     Problem: Falls - Risk of:  Goal: Will remain free from falls  Description: Will remain free from falls  11/16/2021 0842 by Yodit Coe RN  Outcome: Ongoing  11/16/2021 0231 by Teri Hernandez RN  Outcome: Ongoing  Goal: Absence of physical injury  Description: Absence of physical injury  Outcome: Ongoing

## 2021-11-16 NOTE — PROGRESS NOTES
[address] [facility]    Acute Rehabilitation OT Evaluation  Date: 21  Patient Name: Jessica Powell       Room: 7496/3454-25  MRN: [id number]  Account: [de-identified]   : 1960  (59 y.o.) Gender: female     Referring Practitioner: Angela Soto MD  Diagnosis: Stroke-Like Symptoms  Additional Pertinent Hx: This is a 59 y.o. female with a significant past medical history of Chronic atrial fibrillation on Eliquis, Type 2 diabetes mellitus, essential systemic hypertension, coronary artery disease s/p CABG in 2004 and PTCA in 2019, heart failure with preserved ejection fraction LVEF 55% and end-stage renal disease secondary to diabetic and hypertensive nephropathy on routine hemodialysis on a Monday/Wednesday/Friday schedule at [address] dialysis unit on [facility] under the care of Dr. Nasim Yeung since 2001 using IJ tunneled dialysis catheter, who presented to the hospital for further evaluation of acute on chronic right-sided weakness, Slurred speech and confusion. She was initially diagnosed with acute/subacute stroke in the left frontal lobe with right-sided weakness 4 months ago. Neurology consulted , MRI brain pending. Treatment Diagnosis: Impaird self care status   Past Medical History:  has a past medical history of Backache, unspecified, CHF (congestive heart failure) ([address] Chronic kidney disease, Coronary atherosclerosis of artery bypass graft, Cramp of limb, Gallstones, Hypertension, Insomnia, Pneumonia, Type II or unspecified type diabetes mellitus with renal manifestations, not stated as uncontrolled(250.40), Type II or unspecified type diabetes mellitus without mention of complication, not stated as uncontrolled, and Unspecified vitamin D deficiency. Past Surgical History:   has a past surgical history that includes Coronary artery bypass graft; Knee arthroscopy; Carpal tunnel release; Breast surgery; Tonsillectomy; Hand surgery;  Ankle fracture surgery; Cholecystectomy, open (N/A); and IR TUNNELED CVC PLACE WO SQ PORT/PUMP > 5 YEARS (8/18/2021). Restrictions  Restrictions/Precautions: Contact Precautions, Fall Risk  Implants present? :  (pt denies)  Other position/activity restrictions: Hemodialysis MWF  Required Braces or Orthoses  Right Lower Extremity Brace:  (lymphadema wraps)  Left Lower Extremity Brace:  (lymphadema wraps)  Required Braces or Orthoses?: Yes    Vitals  Temp: 97.9 °F (36.6 °C)  Pulse: 70  Resp: 19  BP: 139/72  Height: 5' 5\" (165.1 cm)  Weight: 246 lb 7.6 oz (111.8 kg)  BMI (Calculated): 41.1  Oxygen Therapy  SpO2: 93 %  O2 Device: None (Room air)  Level of Consciousness: Alert (0)    Subjective  Subjective: \"I would like to get my hand and right arm going. My memory back. Just being able to walk around and get my right leg going\" Pt reported in regard to ARU goals.    Comments: Pt pleasant and agreeable to occupational therapy  Orientation  Overall Orientation Status: Impaired  Orientation Level: Oriented to person, Disoriented to time, Oriented to place, Oriented to situation (Stated year in the 25s; increased time with month)  Vision  Vision: Impaired  Vision Exceptions: Wears glasses for reading  Hearing  Hearing: Within functional limits  Social/Functional History  Lives With: Family (Mother)  Type of Home:  (Condo)  Home Layout: One level  Home Access: Ramped entrance  Bathroom Shower/Tub: Walk-in shower, Doors (Threshold to step over)  Bathroom Toilet: Handicap height  Bathroom Equipment: Grab bars in shower, Built-in shower seat (Uses wall of shower to get off toilet)  Bathroom Accessibility: Walker accessible  Home Equipment: 4 wheeled walker, Cane, [employer] aid (Has a recliner at home)  ADL Assistance: Independent  Homemaking Assistance: Needs assistance (sisters assist )  Ambulation Assistance: Independent (7LR )  Transfer Assistance: Independent  Active : No  Patient's  Info: family-sister  Mode of Transportation: Car  IADL Comments: Pt sleeps in flat bed at home. Additional Comments: Pt recently discharged from Rehab at [facility]  on 8/26/21 and again on 9-26-21. Pt was going to dialysis 3 times per week. Pt is demonstrating some confusion/memory deficits and above information taken from previous therapy eval 8-12-21. Pt able to identify that her mother is still living with her and her sisters assist with caring for her mother. Objective          Sensation  Overall Sensation Status: Impaired  Additional Comments: Numbness in R foot. Hypersensivity in R lateral portion of calf. Pt reports decreased sensation un RUE.       UE Function           LUE Strength  Gross LUE Strength: WFL  L Hand General: 4/5  LUE Strength Comment: Grossly 4/5  Left Hand Strength -  (lbs)  Handle Setting 2: Avg 34# (35, 33, 35) (Norms: 35-57#)  LUE Tone: Normotonic     LUE AROM (degrees)  LUE AROM : WFL     Left Hand AROM (degrees)  Left Hand AROM: WFL  RUE Strength  R Hand General: 3+/5  RUE Strength Comment: Grossly 3+/5   Right Hand Strength -  (lbs)  Handle Setting 2: Avg 24# (20, 19, 33) (Norms: 35-57#)  RUE Tone: Normotonic     RUE AROM (degrees)  RUE AROM : WFL  RUE General AROM: Able to complete full ROM with increased time to complete     Right Hand AROM (degrees)  Right Hand AROM: WFL  Right Hand General AROM: Able to complete ROM with increased time              Left 9-Hole Peg Test: Impaired  Left 9 Hole Peg Test Time (secs): 32.4  Right 9-Hole Peg Test: Impaired  Right 9 Hole Peg Test Time (secs): 29.6         Fine Motor Skills  Coordination  Movements Are Fluid And Coordinated: No  Coordination and Movement description: Fine motor impairments, Right UE, Decreased speed                           Mobility  Supine to Sit: Stand by assistance       Balance  Sitting Balance: Stand by assistance  Standing Balance: Contact guard assistance  Standing Balance  Time: 1-2 mintues x 5  Activity: functional transfers/mobility, toileting, lower body bathing/dressing  Comment: with RW  Functional Mobility  Functional - Mobility Device: Rolling Walker  Activity: To/from bathroom  Assist Level: Contact guard assistance  Functional Mobility Comments: Verbal cues for hand placement and safety  Bed mobility  Supine to Sit: Stand by assistance  Scooting: Stand by assistance  Comment: Bed mobility with HOB elevated     Transfers  Sit to stand: Contact guard assistance  Stand to sit: Contact guard assistance  Transfer Comments: Verbal cues for hand placement and safety  Toilet Transfers  Toilet - Technique: Ambulating  Equipment Used: Grab bars  Toilet Transfer: Contact guard assistance  Toilet Transfers Comments: Verbal cues for hand placement and safety  Shower Transfers  Shower - Transfer To: Shower seat with back  Shower - Technique: Ambulating  Shower Transfers: Minimal assistance  Shower Transfers Comments: OT assisted pt in backing up to shower with RW and utilizing grab bars to assist with back steping over threshold and sitting on shower chair. Pt demonstrated good safety with increased time to complete shower transfer. Functional Activity Tolerance  Functional Activity Tolerance: Tolerates 30 min exercise with multiple rests   Activity Tolerance: Patient Tolerated treatment well         ADL     ADL  Feeding: Setup (per pt report)  Grooming: Stand by assistance (per pt report)  UE Bathing: Stand by assistance  LE Bathing: Moderate assistance (A with B feet and bottom/stalin-area)  UE Dressing: Contact guard assistance (A with buttons)  LE Dressing: Maximum assistance (A with socks/compression socks/lymphedema wraps)  Toileting: Minimal assistance  Additional Comments: OT facilitated pts engagement in full shower on this date. Pt required assistance with bilateral feet and bottom/periarea hygiene. Pt completed toileting task with assistance with hygiene after bowel movement. Pt required assistance with doffing R sock at this time.  Pt required assistance with donning compression socks and lymphedema wraps due to fatigue. OT scores     Eating  Assistance Needed: Setup or clean-up assistance (per pt report)  CARE Score: 5  Discharge Goal: Independent  Oral Hygiene  Assistance Needed: Supervision or touching assistance  CARE Score: 4  Discharge Goal: Independent  Toileting Hygiene  Assistance Needed: Partial/moderate assistance  CARE Score: 3  Discharge Goal: Independent  Shower/Bathe Self  Assistance Needed: Partial/moderate assistance  CARE Score: 3  Discharge Goal: Independent  Upper Body Dressing  Assistance Needed: Supervision or touching assistance  CARE Score: 4  Discharge Goal: Independent  Lower Body Dressing  Assistance Needed: Partial/moderate assistance  CARE Score: 3  Discharge Goal: Independent  Putting On/Taking Off Footwear  Assistance Needed: Substantial/maximal assistance  CARE Score: 2  Discharge Goal: Independent  Toilet Transfer  Assistance Needed: Supervision or touching assistance  CARE Score: 4  Discharge Goal: Independent      Goals  Patient Goals   Patient goals : \"I would like to get my hand and right arm going. My memory back.  Just being able to walk around and get my right leg going\"  Short term goals  Time Frame for Short term goals: By 4-5 days  Short term goal 1: Pt will complete lower body dressing/bathing with CGA and good safety with use of AE as needed  Short term goal 2: Pt will complete upper body dressing with set-up assistance and good safety/attention to task  Short term goal 3: Pt will complete functional transfers/mobility during self care tasks with SBA and good safety with use of least restrictive device  Short term goal 4: Pt will tolerate standing 5+ minutes during functional activity of choice with good safety  Short term goal 5: Pt will participate in 30+ minutes of therapeutic exercises/functional activities to increase safety and independence with self care and mobility  Long term goals  Time Frame for Long term goals : By discharge  Long term goal 1: Pt will complete BADLs with modified independence and good safety with use of AE as needed.    Long term goal 2: Pt will complete functional transfers/mobility during self care tasks with modified independence and good safety with use of least restrictive device  Long term goal 3: Pt will tolerate standing 10+ minutes during functional activity of choice with good safety  Long term goal 4: Pt will verbalize/demonstrate good understanding of home safety/fall prevention strategies to increase safety and independence with self care and mobiliyt  Long term goal 5: Pt will complete simple meal prep/light house keeping task with supervision and good safety     Assessment  Performance deficits / Impairments: Decreased ADL status, Decreased functional mobility , Decreased ROM, Decreased strength, Decreased safe awareness, Decreased cognition, Decreased endurance, Decreased sensation, Decreased balance, Decreased high-level IADLs, Decreased fine motor control, Decreased coordination  Treatment Diagnosis: Impaird self care status  Prognosis: Good  Decision Making: Medium Complexity  REQUIRES OT FOLLOW UP: Yes  Plan  Times per week: 900 minutes combined between OT/PT/SLP   Times per day: Twice a day  Current Treatment Recommendations: Self-Care / ADL, Strengthening, ROM, Balance Training, Functional Mobility Training, Endurance Training, Cognitive Reorientation, Safety Education & Training, Patient/Caregiver Education & Training, Equipment Evaluation, Education, & procurement, Home Management Training, Cognitive/Perceptual Training             11/16/21 1521 11/16/21 1524   OT Individual Minutes   Time In [id number]   Time Out [id number]   Minutes 77 35   Time Code Minutes    Timed Code Treatment Minutes 46 Minutes 35 Minutes     Electronically signed by Anand Gayle OT on 11/16/21 at 3:25 PM EST

## 2021-11-16 NOTE — FLOWSHEET NOTE
11/16/21 6725   Encounter Summary   Services provided to: Patient   Referral/Consult From: Rounding   Continue Visiting   (11-16-21)   Complexity of Encounter Low   Length of Encounter 15 minutes   Spiritual/Catholic   Type Ritual   Intervention Anointing   Sacraments   Sacrament of Sick-Anointing Anointed  (Fr Erickson 11-16-21)

## 2021-11-16 NOTE — CARE COORDINATION
CASE MANAGEMENT NOTE:    Admission Date:  11/15/2021 Ian Pacheco is a 59 y.o.  female    Admitted for : Stroke-like symptoms [R29.90]    Met with:  Patient and Sister, Jayde Reynolds    PCP:  Kayden Stanley NP                                Insurance:  MMO      Is patient alert and oriented at time of discussion:  Yes    Current Residence/ Living Arrangements:  independently at home with her mother (pt is the caregiver for her mother). Current Services PTA:  Yes, HD and has used [address] in the past for VNS    Does patient go to outpatient dialysis: Yes  If yes, location and chair time: [facility] MWF @ 1pm.    Is patient agreeable to VNS/Outpatient therapy Yes    [place] of choice provided:  Yes    List of [address] Agencies/Outpatient therapy provided: No - pt has used [address] in the past and would like them again    VNS/Outpatient therapy chosen: [address] Notified Liliana Karlos from [address] of this and faxed referral.    DME:  walker, wheelchair, shower chair and glucometer    Home Oxygen: No    Nebulizer: No    CPAP/BIPAP: No    Supplier: N/A    Handicap Placard: Yes, patient already has one. Potential Assistance Needed: VNS    Pharmacy:  [facility]       Does Patient want to use MEDS to BEDS? No    Is patient currently receiving oral anticoagulation therapy?  Yes - Eliquis    Family Members/Caregivers that pt would like involved in their care:    Yes    If yes, list name here:  Sarahhyun Vianney and Jayde Reynolds    Transportation Provider:  sister             Discharge Plan:  Home with VNS - [address]               F/U Dr. Ruby Burris 12/28 @ 2pm.    F/U Kayden Stanley 11/30 @ 2:30pm.    Electronically signed by: Anuj Duke RN on 11/16/2021 at 3:25 PM

## 2021-11-16 NOTE — CONSULTS
Comprehensive Nutrition Assessment    Type and Reason for Visit:  Initial, Consult (Evaluate and Treat)    Nutrition Recommendations/Plan:  Increase carbohydrate allowance to 4 choices per meal and add 3-4 gm Na restriction. Nutrition Assessment:  Pt admitted to rehab due to stroke-like symptoms with functional decline from previous stroke deficit. Pt states that she has been eating fine, with nearly 100% of lunch consumed. States occasional difficulty with food getting a little stuck when swallowing, but nothing signifcant and she is able to get it down with liquids. Voiced questions about the carbohydrate control diet and wrote herself notes about it (has received diet instruction in past). Renal diet currently not in place. Will increase carbohydrate allowance to 4 choices per meal and add 3-4 gm sodium restriction. Monitor labs for additional diet modifications if indicated. Malnutrition Assessment:  Malnutrition Status: At risk for malnutrition (Comment)    Context:  Acute Illness     Findings of the 6 clinical characteristics of malnutrition:  Energy Intake:  Mild decrease in energy intake (Comment)  Weight Loss:  No significant weight loss     Body Fat Loss:  No significant body fat loss     Muscle Mass Loss:  No significant muscle mass loss    Fluid Accumulation:  1 - Mild (Related to medical condition (renal and cardiac function)) Extremities   Strength:  Not Performed    Estimated Daily Nutrient Needs:  Energy (kcal):  1850 based on [place] with 1.1 factor (may vary with dialysis, overall medical condition); Weight Used for Energy Requirements:  Admission     Protein (g):  91 based on 1.6 gm per kg; Weight Used for Protein Requirements:  Ideal          Nutrition Related Findings:  Edema: +2 RLE, LLE. POC Glucose: 310-327; Na: 133, BUN: 49, Cr: 3.20, K: 4.9 (11/16). Phosphorus: 3.3 (11/11). Lantus, insulin coverage. Other labs and meds reviewed.       Wounds:  Wound Consult Pending (Toe wound)       Current Nutrition Therapies:    ADULT DIET; Regular; 3 carb choices (45 gm/meal)    Anthropometric Measures:  · Height: 5' 5\" (165.1 cm)  · Current Body Weight: 246 lb 7.6 oz (111.8 kg)   · Admission Body Weight: 246 lb 7.6 oz (111.8 kg)    · Ideal Body Weight: 125 lbs; % Ideal Body Weight 197.2 %   · BMI: 41  · BMI Categories: Obese Class 3 (BMI 40.0 or greater)       Nutrition Diagnosis:   · Altered nutrition-related lab values related to renal dysfunction, endocrine dysfuntion as evidenced by lab values    Nutrition Interventions:   Food and/or Nutrient Delivery:  Modify Current Diet  Nutrition Education/Counseling:   (Answered pt's questions; provide additional eduation if requested)   Coordination of Nutrition Care:  Continue to monitor while inpatient    Goals:  PO intake % of most meals       Nutrition Monitoring and Evaluation:   Behavioral-Environmental Outcomes:   (Difficulty with memory)   Food/Nutrient Intake Outcomes:  Food and Nutrient Intake  Physical Signs/Symptoms Outcomes:  Biochemical Data, Fluid Status or Edema, Weight, Skin     Discharge Planning: Too soon to determine     Some areas of assessment may be incomplete due to standard COVID-19 Precautions. Natasha Bautista R.D., L.D.   Phone: [phone]

## 2021-11-16 NOTE — PROGRESS NOTES
Physical Therapy  Facility/Department: [facility] MED SURG  Daily Treatment Note  NAME: Faina Hernandez  : 1960  MRN: [id number]    Date of Service: 2021    Discharge Recommendations:  Patient would benefit from continued therapy after discharge        Assessment      Activity Tolerance  Activity Tolerance: Patient limited by fatigue; Patient limited by pain  Activity Tolerance: Pt requires multiple seated rest breaks as well as increased time to complete tasks d/t inc neck pain with mobility and quickly fatigueing RLE     Patient Diagnosis(es): There were no encounter diagnoses. has a past medical history of Backache, unspecified, CHF (congestive heart failure) ([facility] [address] Chronic kidney disease, Coronary atherosclerosis of artery bypass graft, Cramp of limb, Gallstones, Hypertension, Insomnia, Pneumonia, Type II or unspecified type diabetes mellitus with renal manifestations, not stated as uncontrolled(250.40), Type II or unspecified type diabetes mellitus without mention of complication, not stated as uncontrolled, and Unspecified vitamin D deficiency. has a past surgical history that includes Coronary artery bypass graft; Knee arthroscopy; Carpal tunnel release; Breast surgery; Tonsillectomy; Hand surgery; Ankle fracture surgery; Cholecystectomy, open (N/A); and IR TUNNELED CVC PLACE WO SQ PORT/PUMP > 5 YEARS (2021). Restrictions  Restrictions/Precautions  Restrictions/Precautions: Contact Precautions, Fall Risk  Required Braces or Orthoses?: Yes  Implants present? :  (pt denies)  Required Braces or Orthoses  Right Lower Extremity Brace:  (lymphadema wraps)  Left Lower Extremity Brace:  (lymphadema wraps)  Position Activity Restriction  Other position/activity restrictions: Hemodialysis MWF  Subjective   General  Chart Reviewed: Yes  Additional Pertinent Hx:  This is a 59 y.o. female with a significant past medical history of Chronic atrial fibrillation on Eliquis, Type 2 diabetes mellitus, essential systemic hypertension, coronary artery disease s/p CABG in 2004 and PTCA in 2019, heart failure with preserved ejection fraction LVEF 55% and end-stage renal disease secondary to diabetic and hypertensive nephropathy on routine hemodialysis on a Monday/Wednesday/Friday schedule at [address] [place] [address] dialysis unit on [facility] under the care of Dr. Ever Gordillo since August 2001 using IJ tunneled dialysis catheter, who presented to the hospital for further evaluation of acute on chronic right-sided weakness, Slurred speech and confusion. She was initially diagnosed with acute/subacute stroke in the left frontal lobe with right-sided weakness 4 months ago. Neurology consulted , MRI brain pending. Response To Previous Treatment: Not applicable  Family / Caregiver Present: No  Referring Practitioner: Dr. Damian Reed  Subjective  Subjective: Pt reported recent neck pain and LE fatigue. Pt agreeable and pleasant throughout PT. Orientation  Orientation  Overall Orientation Status: Impaired  Orientation Level: Oriented to place; Oriented to situation; Oriented to person; Disoriented to time    Objective      Transfers  Sit to Stand: Contact guard assistance  Stand to sit: Contact guard assistance  Bed to Chair: Contact guard assistance  Stand Pivot Transfers: Contact guard assistance  Comment: All transfers performed with RW and CGA for safety. Pt demos correct and safe hands placement.     Ambulation  Ambulation?: Yes  Ambulation 2  Surface - 2: level tile  Device 2: Rolling Walker  Other Apparatus 2: Wheelchair follow (both CGA and WC follow provided by a single PTA)  Assistance 2: Contact guard assistance  Quality of Gait 2: cautious  Gait Deviations: Slow Annie; Decreased step length; Decreased step height  Distance: 65 ft  Stairs/Curb  Stairs?: Yes  Stairs  # Steps : 2  Curbs: 6\"  Device:  (// bars)  Assistance: Contact guard assistance        Exercises  Comments: Ed on technique with good carry over  Other exercises  Other exercises?: Yes  Other exercises 1: Seated Ex: BLE x15 (RLE AROM ; LLE AAROM)  Other exercises 3: STS x4  Other exercises 5: NuStep: 20 min, workload 2 (seat: 9 ; hands: 11)  Other exercises 6: Standing marches: BLE x10       Goals  Short term goals  Time Frame for Short term goals: 5 days  Short term goal 1: pt to demo all bed mobility with supervision to decrease burden of care  Short term goal 2: pt to demo all transfers with SBA using RW  Short term goal 3: pt to amb 48' with 2 turns using RW and SBA  Short term goal 4: pt to negotiate at least 1 step with UE support and SBA to allow for safe access into home shower  Long term goals  Time Frame for Long term goals : Until D/C  Long term goal 1: pt to demo all bed mobility independently to decrease burden of care  Long term goal 2: pt to demo all transfers with Mod I with RW  Long term goal 3: pt to amb 125' with RW on even surfaces and at least 10' uneven ramped surfaces with RW demonstrating Mod I   Long term goal 4: pt to demo improved RLE strength by 1/2 manual muscle grade to improve safety with functional mobility   Long term goal 5: pt to improve dynamic standing balance to fair + to improve safety with functional mobility   Long term goal 6: pt to improve PASS score to 30/36 to demo improved balance and safety with mobility   Patient Goals   Patient goals : to get stronger    Plan    Plan  Times per week: 900 minutes for combined PT/OT/ST therapies 2* HemoDialysis  Plan weeks: 7-10 days  Specific instructions for Next Treatment: 1 Step negotiation, RLE strengthening, dynamic balance training, Endurance training  Current Treatment Recommendations: Strengthening, Balance Training, Functional Mobility Training, Transfer Training, Endurance Training, Gait Training, Home Exercise Program, Safety Education & Training, Patient/Caregiver Education & Training  Safety Devices  Type of devices: Call light within reach, Gait belt, Patient at risk for falls, Left in chair, All fall risk precautions in place     Therapy Time     11/16/21 1406   PT Individual Minutes   Time In 1406   Time Out 1440   Minutes [address], [place]

## 2021-11-16 NOTE — DISCHARGE INSTR - COC
Continuity of Care Form    Patient Name: Altagracia Palafox   :  1960  MRN:  [id number]    Admit date:  11/15/2021  Discharge date:  21    Code Status Order: Full Code   Advance Directives:      Admitting Physician:  Austin Rangel MD  PCP: Shelbi Acosta, APRN - CNP    Discharging Nurse: FERNANDA YEH [facility] Unit/Room#: -01  Discharging Unit Phone Number: *[phone]    Emergency Contact:   Extended Emergency Contact Information  Primary Emergency Contact:  [address], [address] Phone: [phone]  Relation: Brother/Sister  Secondary Emergency Contact: Gerardotona Holden, [address] Phone: [phone]  Relation: Brother/Sister    Past Surgical History:  Past Surgical History:   Procedure Laterality Date    ANKLE FRACTURE SURGERY      BREAST SURGERY      CARPAL TUNNEL RELEASE      CHOLECYSTECTOMY, OPEN N/A     CORONARY ARTERY BYPASS GRAFT      x3    HAND SURGERY      IR TUNNELED CATHETER PLACEMENT GREATER THAN 5 YEARS  2021    IR TUNNELED CATHETER PLACEMENT GREATER THAN 5 YEARS 2021 [facility] SPECIAL PROCEDURES    KNEE ARTHROSCOPY      right    TONSILLECTOMY         Immunization History:   Immunization History   Administered Date(s) Administered    COVID-19, Pfizer, PF, 30mcg/0.3mL 2021, 2021    Influenza Virus Vaccine 10/18/2018, 09/10/2019, 2020    Influenza, MDCK Quadv, IM, PF (Flucelvax 2 yrs and older) 2021    Influenza, Quadv, IM, (6 mo and older Fluzone, Flulaval, Fluarix and 3 yrs and older Afluria) 10/16/2017    Influenza, Quadv, IM, PF (6 mo and older Fluzone, Flulaval, Fluarix, and 3 yrs and older Afluria) 10/18/2018, 09/10/2019, 2019, 2020    Pneumococcal Conjugate 13-valent (Ofbchyx25) 2019    Pneumococcal Polysaccharide (Tnkaeigkq59) 10/30/2014    Tdap (Boostrix, Adacel) 2017       Active Problems:  Patient Active Problem List   Diagnosis Code    Atherosclerosis of coronary artery bypass graft of native heart without angina pectoris I25.810    Acute renal failure (HCC) N17.9    Diabetes mellitus due to underlying condition with diabetic nephropathy, with long-term current use of insulin ([facility]) E08.21, Z79.4    Chronic diastolic heart failure ([facility]) I50.32    Diabetic polyneuropathy associated with type 2 diabetes mellitus ([facility]) E11.42    History of coronary artery bypass graft Z95.1    Iron deficiency anemia D50.9    Spinal stenosis of lumbar region with neurogenic claudication M48.062    Mixed hyperlipidemia E78.2    Stage 4 chronic kidney disease ([facility]) N18.4    Type 2 diabetes mellitus with kidney complication, with long-term current use of insulin ([facility]) E11.29, Z79.4    Syncope and collapse R55    Obesity, Class II, BMI 35-39.9 E66.9    Thyroid nodule greater than or equal to 1 cm in diameter incidentally noted on imaging study E04.1    Essential hypertension I10    Anemia in stage 4 chronic kidney disease ([facility]) N18.4, D63.1    Chronic ischemic heart disease I25.9    Ischemic stroke of frontal lobe ([facility]) I63.9    Stroke-like symptoms R29.90    Morbid obesity with BMI of 40.0-44.9, adult ([facility]) E66.01, Z68.41    Acute encephalopathy G93.40    Disequilibrium syndrome E87.8    Debility R53.81    Long-term memory loss R41.3    Muscle right arm weakness M62.81    Anxiety F41.9    Chronic midline low back pain with bilateral sciatica M54.41, M54.42, G89.29    Need for immunization against influenza Z23    Altered mental status R41.82       Isolation/Infection:   Isolation            Contact          Patient Infection Status       Infection Onset Added Last Indicated Last Indicated By Review Planned Expiration Resolved Resolved By    [facility] 08/03/20 08/13/21 08/13/21 Juan Carlos Vogel RN        Added from external infection.             Nurse Assessment:  Last Vital Signs: /72   Pulse 70   Temp 97.9 °F (36.6 °C)   Resp 19   Ht 5' 5\" (1.651 m)   Wt 246 lb 7.6 oz (111.8 kg)   SpO2 93%   BMI 41.02 kg/m²     Last documented pain score (0-10 scale): Pain Level: 6  Last Weight:   Wt Readings from Last 1 Encounters:   11/15/21 246 lb 7.6 oz (111.8 kg)     Mental Status:  oriented    IV Access:  - Dialysis Catheter  - site  right, insertion date:      Nursing Mobility/ADLs:  Walking   Assisted  Transfer  Assisted  Bathing  Assisted  Dressing  Independent  [address] Delivery   whole    Wound Care Documentation and Therapy:  Wound 08/11/21 Buttocks Right;Posterior (Active)   Number of days: 96       Wound 11/16/21 Toe (Comment  which one) Anterior; Left (Active)   Wound Cleansed Not Cleansed 11/16/21 0800   Wound Assessment Other (Comment) 11/16/21 0800   Drainage Amount None 11/16/21 0800   Odor None 11/16/21 0800   Number of days: 0        Elimination:  Continence: Bowel: Yes  Bladder: Yes  Urinary Catheter: None   Colostomy/Ileostomy/Ileal Conduit: No       Date of Last BM: 11/22/21  No intake or output data in the 24 hours ending 11/16/21 1521  No intake/output data recorded. Safety Concerns:     History of Falls (last 30 days)    Impairments/Disabilities:      None    Nutrition Therapy:  Current Nutrition Therapy:   - Oral Diet:  Carb Control 4 carbs/meal (1800kcals/day) and Cardiac    Routes of Feeding: Oral  Liquids: Thin Liquids  Daily Fluid Restriction: no  Last Modified Barium Swallow with Video (Video Swallowing Test): not done    Treatments at the Time of Hospital Discharge:   Respiratory Treatments:   Oxygen Therapy:  is not on home oxygen therapy. Ventilator:    - No ventilator support    Rehab Therapies: Physical Therapy and Occupational Therapy  Weight Bearing Status/Restrictions: No weight bearing restirctions  Other Medical Equipment (for information only, NOT a DME order):     Other Treatments: skilled nursing assessment, medication education and monitoring    Patient's personal belongings (please select all that are sent with patient):  Rose Marie AGUAYO SIGNATURE: Electronically signed by Dickson Abdi RN on 11/23/21 at 4:12 AM EST    CASE MANAGEMENT/SOCIAL WORK SECTION    Inpatient Status Date: 11/15/2021  Readmission Risk Assessment Score:  Readmission Risk              Risk of Unplanned Readmission:  37           Discharging to Facility/ [address]  Phone: [phone]  Fax [phone]    Dialysis  [facility] at [address]      / signature: Electronically signed by Juliet Dunne RN on 11/16/21 at 3:21 PM EST    PHYSICIAN SECTION    Prognosis: Good    Condition at Discharge: Stable    Rehab Potential (if transferring to Rehab): Good    Recommended Labs or Other Treatments After Discharge:      follow-up with 1. PCP Brian Cooper. Nephrology - Dr Sheng Driscoll 3. Surgeon re AV fistula 4.  Rehab-Edmund/Hiram, 5. Podiatry left great toe eschar-Dr. Brandt Walters, 6. Psychiatry/counseling questionable , continue hemodialysis    home PT/OT/Nsg,      patient resume aspirin/Eliquis when okay with surgeon. CBC/BMP 1 week to PCP, monitor glucose before meals and at bedtime    No driving,    Physician Certification: I certify the above information and transfer of Antoinette Tsai  is necessary for the continuing treatment of the diagnosis listed and that she requires [address] for less 30 days. Update Admission H&P: No change in H&P    PHYSICIAN SIGNATURE:  Electronically signed by Sami High. Guanakito Rossi MD on 11/22/21 at 10:56 AM EST

## 2021-11-16 NOTE — PROGRESS NOTES
Physical Therapy    Facility/Department: [facility] MED SURG  Initial Assessment    NAME: Ian Max  : 1960  MRN: [id number]    Date of Service: 2021    Discharge Recommendations:  Patient would benefit from continued therapy after discharge   PT Equipment Recommendations  Equipment Needed:  (TBD)    Assessment      Body structures, Functions, Activity limitations: Decreased functional mobility ; Decreased strength; Decreased cognition; Decreased endurance; Decreased sensation; Decreased balance; Decreased coordination; Decreased posture; Increased pain; Decreased ROM  Assessment:  Pt with recent left frontal stroke with residual mild right-sided weakness, pt is a Hemodialysis pt. Presents with impaired cognition, increased Right side weakness and sensation deficits. Pt requiring SBA for bed mobility, and CGA for transfers and short distance gait 30-40' on both level tile and carpeting. pt is motivated and has good home support. pt would benefit from continued PT services to address deficits of strength, coordination, balance, posture, and overall functional mobility to allow for a safe return home. Treatment Diagnosis: impaired mobility 2* CVA  Specific instructions for Next Treatment: 1 Step negotiation, RLE strengthening, dynamic balance training, Endurance training  Prognosis: Good  Decision Making: Medium Complexity  History: Hemodialysis pt, Recent CVA, Chronic back/neck pain. Exam: ROM, MMT, Balance and functional mobility assessments. PASS assessment  Clinical Presentation: pt is pleasant and cooperative throughout.    Barriers to Learning: cognition/memory deficits  REQUIRES PT FOLLOW UP: Yes  Activity Tolerance  Activity Tolerance: Patient limited by fatigue; Patient limited by pain  Activity Tolerance: Pt requires multiple seated rest breaks as well as increased time to complete tasks d/t inc neck pain with mobility and quickly fatigueing RLE       Patient Diagnosis(es): There were no catheter, who presented to the hospital for further evaluation of acute on chronic right-sided weakness, Slurred speech and confusion. She was initially diagnosed with acute/subacute stroke in the left frontal lobe with right-sided weakness 4 months ago. Neurology consulted , MRI brain pending. Response To Previous Treatment: Not applicable  Family / Caregiver Present: No  Referring Practitioner: Dr. Amador Flynn  Referral Date : 11/15/21  Diagnosis: Stroke-Like Symptoms  Follows Commands: Within Functional Limits  General Comment  Comments: Nurse Maria Antonia Pedraza approves PT assessment   Subjective  Subjective: pt is agreeable to therapy assessments this AM. Pt is pleasant throughout. Pain Screening  Patient Currently in Pain: Denies  Pain Assessment  Pain Assessment: 0-10  Pain Level: 8  Patient's Stated Pain Goal: No pain  Pain Type: Acute pain  Pain Location: Neck  Pain Orientation: Mid; Posterior  Pain Descriptors: Aching  Pain Frequency: Intermittent (Pain inc with upright mobility/activity)  Pain Onset: On-going  Non-Pharmaceutical Pain Intervention(s): Repositioned;  Rest  Response to Pain Intervention: Patient Satisfied  Vital Signs  Patient Currently in Pain: Denies  Patient Observation  Observations: pt resting in bed upon arrival of this writer; pt with inc neck pain during mobility requiring frequent seated rest breaks d/t neck pain and RLE heaviness/muscle fatigue       Orientation  Orientation  Overall Orientation Status: Impaired  Orientation Level: Oriented to place; Oriented to situation; Oriented to person; Disoriented to time  Social/Functional History  Social/Functional History  Lives With: Family (Mother)  Type of Home:  (Condo)  Home Layout: One level  Home Access: Ramped entrance  Bathroom Shower/Tub: Walk-in shower, Doors (Threshold to step over)  [employer]: Handicap height  Bathroom Equipment: Grab bars in shower, Built-in shower seat (Uses wall of shower to get off toilet)  Bathroom Accessibility: Walker accessible  Home Equipment: 4 wheeled walker, Cane, [employer] aid (Has a recliner at home)  ADL Assistance: Independent  Homemaking Assistance: Needs assistance (sisters assist )  Ambulation Assistance: Independent (3YS )  Transfer Assistance: Independent  Active : No  Patient's  Info: family-sister  Mode of Transportation: Car  IADL Comments: Pt sleeps in flat bed at home. Additional Comments: Pt recently discharged from Rehab at [facility]  on 8/26/21 and again on 9-26-21. Pt was going to dialysis 3 times per week. Pt is demonstrating some confusion/memory deficits and above information taken from previous therapy eval 11/11/21. Pt able to identify that her mother is still living with her and her sisters assist with caring for her mother. Cognition        Objective          PROM RLE (degrees)  RLE PROM: WFL  AROM RLE (degrees)  RLE General AROM: Pt able to move R Hip/knee joint through partial ROM, actively able to perform DF/PF full range  AROM LLE (degrees)  LLE AROM : WFL  AROM RUE (degrees)  RUE General AROM: See OT Eval  AROM LUE (degrees)  LUE General AROM: See OT Eval  Strength RLE  Strength RLE: Exception  R Hip Flexion: 3-/5  R Hip Extension: 3-/5  R Hip ABduction: 4-/5  R Hip ADduction: 4-/5  R Knee Flexion: 3-/5  R Knee Extension: 3-/5  R Ankle Dorsiflexion: 3+/5  R Ankle Plantar flexion: 3/5  Strength LLE  Comment: Grossly 4/5  Strength RUE  Comment: See OT Eval  Strength LUE  Comment: See OT Eval  Tone RLE  RLE Tone: Normotonic  Tone LLE  LLE Tone: Normotonic  Coordination  Movements Are Fluid And Coordinated: No  Coordination and Movement description: Impaired Heel-to-shin, Slowed/impaired Rapid Alternating Movement  Sensation  Overall Sensation Status: Impaired  Additional Comments: Numbness in R foot & Lower Leg. Diminised sensation in Left foot (chronic). Pt reports decreased sensation un RUE.    Bed mobility  Bridging: Stand by assistance  Rolling to Left: Stand by assistance  Rolling to Right: Stand by assistance  Supine to Sit: Stand by assistance  Sit to Supine: Stand by assistance (Assist level reported by Speech Therapist)  Scooting: Stand by assistance  Comment: Pt performs bed mobility with HOB slightly elevated and minimal use of bed rails. pt c/o mild dizziness upon initially sitting upright;  Transfers  Sit to Stand: Contact guard assistance  Stand to sit: Contact guard assistance  Bed to Chair: Contact guard assistance  Stand Pivot Transfers: Contact guard assistance  Comment: All transfers performed with RW and CGA for safety. Pt demos correct and safe hands placement. Ambulation  Ambulation?: Yes  More Ambulation?: Yes  Ambulation 1  Surface: level tile  Device: Rollator  Assistance: Contact guard assistance  Quality of Gait: right LE clearing floor however no heel/toe gait, minimal knee flexion, very slow pace  Gait Deviations: Slow Annie; Decreased step length; Decreased step height  Distance: 8'x2, 34', 17', 5'  Comments: Pt ambulates within room and hallway with RW and CGA. Pt requiring multiple seated rest breaks after each walking distance d/t inc neck pain with mobility and inc sensation of heaviness/muscle fatigue in RLE. Cues to inc focus on Heel strike/pushoff on RLE; pt with fair carry over demo. Ambulation 2  Surface - 2: uneven (linoleum to carpet transition )  Device 2: [address] 2: Contact guard assistance  Quality of Gait 2: pt more cautious with uneven surface, even slow pace. Gait Deviations: Slow Annie; Decreased step length; Decreased step height  Distance: 40' total (24' on carpeting)  Comments: pt amb on linoleum to carpet transition needing inc time to perform as patient is extra cautious with this task.    Stairs/Curb  Stairs?: No (stairs deferred d/t inc RLE fatigue and neck pain)     Balance  Posture: Fair  Sitting - Static: Good  Sitting - Dynamic: Fair; +  Standing - Static: Fair; +  Standing - Dynamic: Fair  Comments: standing balance assessed with RW. Pt participates in Brushing teeth while standing at counter top alternating from single to Bilat UE support on sink. Pt demos picking up object from floor with R hand w/ LUE support on bed rail with CGA for safety. Exercises  Hip Flexion: Seated alternating Marching x20 R/L. RLE AAROM/LLE AROM  Comments: Ed on technique with good carry over     [address]:  Due to impaired functional endurance and/or medical issues, the patient is to be seen for a combined total of at least a  900 minutes over 7 days of Physical, Occupational and/or Speech Therapy. Hemodialysis MWF. Plan  Times per week: 900 minutes for combined PT/OT/ST therapies 2* HemoDialysis  Plan weeks: 7-10 days  Specific instructions for Next Treatment: 1 Step negotiation, RLE strengthening, dynamic balance training, Endurance training  Current Treatment Recommendations: Strengthening, Balance Training, Functional Mobility Training, Transfer Training, Endurance Training, Gait Training, Home Exercise Program, Safety Education & Training, Patient/Caregiver Education & Training  Safety Devices  Type of devices: Call light within reach, Gait belt, Patient at risk for falls, Left in chair, Nurse notified (Nurse Yenny Stevens)    G-Code       OutComes Score    Postural Assessment Scale for Stroke Patients   (PASS) Scoring Form     Give the subject instructions for each item as written below. When scoring the item, record the lowest response category that applies for each item. Maintaining a Posture  1. Sitting Without Support  Examiner: Have the subject sit on a bench/mat without support and with feet flat on the floor. 3 Can sit for 5 minutes without support  2. Standing with Support Examiner: Have the subject stand, providing support as needed. Evaluate only the ability to stand with or without support. Do not consider the quality of the stance.    3     Can stand with support of only 1 hand  3. Standing Without Support  Examiner:  Have the subject stand without support. Evaluate only the ability to stand with or without support. Do not consider the quality of the stance. 2  Can stand without support for 1 minute or stands slightly asymmetrically   4. Standing on Non-paretic Leg  Examiner: Have the subject stand on the non-paretic leg. Evaluate only the ability to bear weight entirely on the non-paretic leg. Do not consider how the subject accomplishes the task. 0  Cannot stand on non-paretic leg    5. Standing on Paretic Leg  Examiner: Have the subject stand on the paretic leg. Evaluate only the ability to bear weight entirely on the paretic leg. Do not consider how the subject accomplishes the task. 0  Cannot stand of paretic leg     Maintaining Posture SUBTOTAL     8/15    Changing a Posture  6. Supine to Paretic Side Lateral  Examiner:  Begin with the subject in supine on a treatment mat. Instruct the subject to roll to the paretic side (lateral movement). Assist as necessary. Evaluated the subject's performance on the amount of help required. Do not consider the quality of performance. 2  Can perform with little help  7. Supine to Non-paretic Side Lateral  Examiner:  Begin with the subject in supine on a treatment mat. Instruct the subject to roll to the non-paretic side (lateral movement). Assist as necessary. Evaluate the subject's Performance on the amount of help required. Do not consider the quality of performance. 2  Can perform with little help  8. Supine to Sitting up on the Edge of the Mat   Examiner:  Begin with the subject in supine on a treatment mat. Instruct the subject to come to sitting on the edge of the mat. Assist as necessary. Evaluate the subject's performance on the amount of help required. Do not considered the quality performance. 2  Can perform with little help   9.   Sitting on the Edge of the Mat to Supine  Examiner: Begin with the subject sitting on the edge of a treatment mat. Instruct the subject to return to supine. Assist as necessary. Evaluate the subjects performance on the amount of help required. Co not consider the quality of performance. 2  Can perform with little help  10. Sitting to Standing Up  Examiner:  Begin with the subject sitting on the edge of a treatment mat. Instruct the subject to stand up without support. Assist if necessary. Evaluated the subject's performance on the amount ot help required. Do not consider the quality of the performance. 2  Can perform with little help  11. Standing Up to Sitting Down  Examiner:  Begin with the subject standing by the edge of a treatment mat. Instruct the subject to sit on the edge of mat without support. Assist if necessary. Evaluated the subject's performance on the amount of help required. Do not consider the quality of the performance. 2  Can perform with little help  12 . Standing, Picking up a Pencil from the Floor  Examiner:  Begin with the subject standing. Instruct the subject to  a pencil from the floor without support. Assist if necessary. Evaluate the subject's performance on the amount of help required. Do Not consider the quality of the performance.    2  Can perform with little help    Changing Posture SUBTOTAL   14/21    PASS TOTAL   22/36     Goals  Short term goals  Time Frame for Short term goals: 5 days  Short term goal 1: pt to demo all bed mobility with supervision to decrease burden of care  Short term goal 2: pt to demo all transfers with SBA using RW  Short term goal 3: pt to amb 48' with 2 turns using RW and SBA  Short term goal 4: pt to negotiate at least 1 step with UE support and SBA to allow for safe access into home shower  Long term goals  Time Frame for Long term goals : Until D/C  Long term goal 1: pt to demo all bed mobility independently to decrease burden of care  Long term goal 2: pt to demo all transfers with Mod I with RW  Long term goal 3: pt to amb 125' with RW on even surfaces and at least 10' uneven ramped surfaces with RW demonstrating Mod I   Long term goal 4: pt to demo improved RLE strength by 1/2 manual muscle grade to improve safety with functional mobility   Long term goal 5: pt to improve dynamic standing balance to fair + to improve safety with functional mobility   Long term goal 6: pt to improve PASS score to 30/36 to demo improved balance and safety with mobility   Patient Goals   Patient goals : to get stronger       Therapy Time   Individual Concurrent Group Co-treatment   Time In 1000         Time Out 1101         Minutes 61         Timed Code Treatment Minutes: 40 Minutes       Helen Rodriguez, PT

## 2021-11-16 NOTE — PROGRESS NOTES
Patient admitted to room 2050. Patient's vital signs obtained, patient resting in bed, call light within reach, and bed alarm on. Spoke with Dr. Zachary Tenorio via telephone and reviewed admission medication orders.

## 2021-11-16 NOTE — PROGRESS NOTES
Consult:  Silver Garcia is a 59 y.o. RHD female admitted to [facility] 11/10/2021.       Patient admitted with episode of AMS and weakness R extremities at dialysis. She was having acute memory loss. She is known from 2 previous acute rehab admissions after ischemic CVA.     Nephrology consulted to manage ESRD and hemodialysis on MWF.     Neurology consulted. MRI negative for acute changes. Planning EEG. If recurrent amnesia continues they will consider lumbar puncture to rule out encephalitis. She has known 70% R ICA stenosis.     Patient seen in hemodialysis. She notes that her memory is mildly improved since admission. She now recognizes her sister. She remembers me from previous admission. She denies pain but reports numbness from mid forearm extending to all digits in her R hand. She also notes significant weakness in her R leg. These have worsened from her previous level of function. Pain:  Pain Assessment  Pain Assessment: 0-10  Pain Level: 0    Assessment:      Diagnosis: Cognitive impairment. Pt. demonstrated functional speech and language with mildly impaired thought organization (long response latency, thought flexibility), long term recall/delayed recall, and working memory. Pt also demonstrated difficulty with spelling tasks and reports spelling issues when texting loved ones since admission (agraphia/alexia?)    Recommendations:  Requires SLP Intervention: Yes  Duration/Frequency of Treatment: 1-2x per day while on aru  D/C Recommendations: To be determined       Plan:   Goals:  Short-term Goals  Goal 1: Pt answer biographical/orientation questions 90% accuracy  Goal 2: Pt will complete divergent/generative naming tasks 90% accuracy  Goal 3: Pt will complete abstract verbal reasoning (convergent/divergent and inferencing) tasks with 90% accuracy  Goal 4: Pt will recall 3-5 units with delay 90% accuracy  Goal 5:  Increase pt ed re use of compensatory memory strategies with returned demo at 100%  Goal 6: Pt will complete functional writing tasks with 90% accuracy   Patient/family involved in developing goals and treatment plan: yes, pt   Subjective:   Previous level of function and limitations: lives with her mother  General  Chart Reviewed: Yes  Patient assessed for rehabilitation services?: Yes  General Comment  Comments: Pt with hx of ST targeting cognition (specifically short/long term recall)  Subjective  Subjective: \"I'd surely like to get my memory back\"  Social/Functional History  Lives With: Family  Home Layout: One level  Vision  Vision: Impaired  Vision Exceptions: Wears glasses for reading  Hearing  Hearing: Within functional limits           Objective:     Oral/Motor  Oral Motor: Within functional limits    Auditory Comprehension  Comprehension: Within Functional Limits         Expression  Primary Mode of Expression: Verbal    Verbal Expression  Verbal Expression: Exceptions to functional limits  Convergent: Mild  Divergent: Mild    Written Expression  Written Expression: Exceptions to [employer] (spelling issues observed, pt not self correcting)    Motor Speech  Motor Speech: Within Functional Limits         Cognition:      Orientation  Overall Orientation Status: Impaired  Attention  Attention: Within Functional Limits  Memory  Memory: Exceptions to [employer] [facility] [place]  Long-term Memory: Severe  Short-term Memory: Moderate  Working Memory: Moderate  Problem Solving  Problem Solving: Within Functional Limits  Abstract Reasoning  Abstract Reasoning: Exceptions to [employer] [place]  Divergent Thinking: Mild  Safety/Judgement  Safety/Judgement: Exceptions to [facility] [place]  Flexibility of Thought: Mild    Additional Assessments:      No swallowing concerns at this time.         Prognosis:  Speech Therapy Prognosis  Prognosis: Fair  Individuals consulted  Consulted and agree with results and recommendations: Patient    Education:  Patient Education: yes  Patient Education Response: Verbalizes understanding          Therapy Time: Individual Concurrent Group Co-treatment   Time In 0940         Time Out 1000         Minutes 20                 Carmelo Munoz M.A., CCC-SLP  11/16/2021 9:57 AM

## 2021-11-17 LAB
ABSOLUTE EOS #: 0.2 K/UL (ref 0–0.4)
ABSOLUTE IMMATURE GRANULOCYTE: ABNORMAL K/UL (ref 0–0.3)
ABSOLUTE LYMPH #: 1 K/UL (ref 1–4.8)
ABSOLUTE MONO #: 0.5 K/UL (ref 0.1–1.3)
ANION GAP SERPL CALCULATED.3IONS-SCNC: 13 MMOL/L (ref 9–17)
BASOPHILS # BLD: 1 % (ref 0–2)
BASOPHILS ABSOLUTE: 0 K/UL (ref 0–0.2)
BUN BLDV-MCNC: 50 MG/DL (ref 8–23)
BUN/CREAT BLD: ABNORMAL (ref 9–20)
CALCIUM SERPL-MCNC: 9.3 MG/DL (ref 8.6–10.4)
CHLORIDE BLD-SCNC: 102 MMOL/L (ref 98–107)
CO2: 22 MMOL/L (ref 20–31)
CREAT SERPL-MCNC: 3.15 MG/DL (ref 0.5–0.9)
DIFFERENTIAL TYPE: ABNORMAL
EOSINOPHILS RELATIVE PERCENT: 3 % (ref 0–4)
GFR AFRICAN AMERICAN: 18 ML/MIN
GFR NON-AFRICAN AMERICAN: 15 ML/MIN
GFR SERPL CREATININE-BSD FRML MDRD: ABNORMAL ML/MIN/{1.73_M2}
GFR SERPL CREATININE-BSD FRML MDRD: ABNORMAL ML/MIN/{1.73_M2}
GLUCOSE BLD-MCNC: 148 MG/DL (ref 65–105)
GLUCOSE BLD-MCNC: 201 MG/DL (ref 65–105)
GLUCOSE BLD-MCNC: 205 MG/DL (ref 65–105)
GLUCOSE BLD-MCNC: 278 MG/DL (ref 65–105)
GLUCOSE BLD-MCNC: 285 MG/DL (ref 70–99)
HCT VFR BLD CALC: 34.5 % (ref 36–46)
HEMOGLOBIN: 11.7 G/DL (ref 12–16)
IMMATURE GRANULOCYTES: ABNORMAL %
LYMPHOCYTES # BLD: 15 % (ref 24–44)
MCH RBC QN AUTO: 33.2 PG (ref 26–34)
MCHC RBC AUTO-ENTMCNC: 33.9 G/DL (ref 31–37)
MCV RBC AUTO: 97.8 FL (ref 80–100)
MONOCYTES # BLD: 7 % (ref 1–7)
NRBC AUTOMATED: ABNORMAL PER 100 WBC
PDW BLD-RTO: 14.9 % (ref 11.5–14.9)
PLATELET # BLD: 140 K/UL (ref 150–450)
PLATELET ESTIMATE: ABNORMAL
PMV BLD AUTO: 8.6 FL (ref 6–12)
POTASSIUM SERPL-SCNC: 4.5 MMOL/L (ref 3.7–5.3)
RBC # BLD: 3.53 M/UL (ref 4–5.2)
RBC # BLD: ABNORMAL 10*6/UL
SEG NEUTROPHILS: 74 % (ref 36–66)
SEGMENTED NEUTROPHILS ABSOLUTE COUNT: 4.9 K/UL (ref 1.3–9.1)
SODIUM BLD-SCNC: 137 MMOL/L (ref 135–144)
WBC # BLD: 6.5 K/UL (ref 3.5–11)
WBC # BLD: ABNORMAL 10*3/UL

## 2021-11-17 PROCEDURE — 80048 BASIC METABOLIC PNL TOTAL CA: CPT

## 2021-11-17 PROCEDURE — 6370000000 HC RX 637 (ALT 250 FOR IP): Performed by: INTERNAL MEDICINE

## 2021-11-17 PROCEDURE — 99232 SBSQ HOSP IP/OBS MODERATE 35: CPT | Performed by: INTERNAL MEDICINE

## 2021-11-17 PROCEDURE — 99232 SBSQ HOSP IP/OBS MODERATE 35: CPT | Performed by: STUDENT IN AN ORGANIZED HEALTH CARE EDUCATION/TRAINING PROGRAM

## 2021-11-17 PROCEDURE — 82947 ASSAY GLUCOSE BLOOD QUANT: CPT

## 2021-11-17 PROCEDURE — 97530 THERAPEUTIC ACTIVITIES: CPT

## 2021-11-17 PROCEDURE — 2500000003 HC RX 250 WO HCPCS: Performed by: INTERNAL MEDICINE

## 2021-11-17 PROCEDURE — 1180000000 HC REHAB R&B

## 2021-11-17 PROCEDURE — 97129 THER IVNTJ 1ST 15 MIN: CPT

## 2021-11-17 PROCEDURE — 97110 THERAPEUTIC EXERCISES: CPT

## 2021-11-17 PROCEDURE — 6370000000 HC RX 637 (ALT 250 FOR IP): Performed by: STUDENT IN AN ORGANIZED HEALTH CARE EDUCATION/TRAINING PROGRAM

## 2021-11-17 PROCEDURE — 6370000000 HC RX 637 (ALT 250 FOR IP): Performed by: FAMILY MEDICINE

## 2021-11-17 PROCEDURE — 90935 HEMODIALYSIS ONE EVALUATION: CPT

## 2021-11-17 PROCEDURE — 36415 COLL VENOUS BLD VENIPUNCTURE: CPT

## 2021-11-17 PROCEDURE — 97535 SELF CARE MNGMENT TRAINING: CPT

## 2021-11-17 PROCEDURE — 85025 COMPLETE CBC W/AUTO DIFF WBC: CPT

## 2021-11-17 PROCEDURE — 5A1D70Z PERFORMANCE OF URINARY FILTRATION, INTERMITTENT, LESS THAN 6 HOURS PER DAY: ICD-10-PCS | Performed by: PHYSICAL MEDICINE & REHABILITATION

## 2021-11-17 PROCEDURE — 97130 THER IVNTJ EA ADDL 15 MIN: CPT

## 2021-11-17 PROCEDURE — 97116 GAIT TRAINING THERAPY: CPT

## 2021-11-17 PROCEDURE — 99213 OFFICE O/P EST LOW 20 MIN: CPT

## 2021-11-17 RX ORDER — INSULIN GLARGINE 100 [IU]/ML
70 INJECTION, SOLUTION SUBCUTANEOUS 2 TIMES DAILY
Status: DISCONTINUED | OUTPATIENT
Start: 2021-11-17 | End: 2021-11-18

## 2021-11-17 RX ORDER — BUSPIRONE HYDROCHLORIDE 10 MG/1
10 TABLET ORAL 3 TIMES DAILY
Status: DISCONTINUED | OUTPATIENT
Start: 2021-11-17 | End: 2021-11-23 | Stop reason: HOSPADM

## 2021-11-17 RX ORDER — SODIUM CITRATE 4 % (5 ML)
1.9 SYRINGE (ML) MISCELLANEOUS PRN
Status: DISCONTINUED | OUTPATIENT
Start: 2021-11-17 | End: 2021-11-17

## 2021-11-17 RX ADMIN — DOCUSATE SODIUM 100 MG: 100 CAPSULE ORAL at 21:24

## 2021-11-17 RX ADMIN — APIXABAN 5 MG: 5 TABLET, FILM COATED ORAL at 21:24

## 2021-11-17 RX ADMIN — GABAPENTIN 300 MG: 300 CAPSULE ORAL at 21:25

## 2021-11-17 RX ADMIN — INSULIN GLARGINE 65 UNITS: 100 INJECTION, SOLUTION SUBCUTANEOUS at 07:55

## 2021-11-17 RX ADMIN — TRAZODONE HYDROCHLORIDE 75 MG: 50 TABLET ORAL at 21:24

## 2021-11-17 RX ADMIN — FUROSEMIDE 80 MG: 40 TABLET ORAL at 21:24

## 2021-11-17 RX ADMIN — INSULIN GLARGINE 70 UNITS: 100 INJECTION, SOLUTION SUBCUTANEOUS at 21:25

## 2021-11-17 RX ADMIN — RANOLAZINE 1000 MG: 1000 TABLET, FILM COATED, EXTENDED RELEASE ORAL at 21:25

## 2021-11-17 RX ADMIN — INSULIN LISPRO 3 UNITS: 100 INJECTION, SOLUTION INTRAVENOUS; SUBCUTANEOUS at 21:25

## 2021-11-17 RX ADMIN — CARVEDILOL 6.25 MG: 6.25 TABLET, FILM COATED ORAL at 21:25

## 2021-11-17 RX ADMIN — BUSPIRONE HYDROCHLORIDE 10 MG: 10 TABLET ORAL at 21:24

## 2021-11-17 RX ADMIN — PANTOPRAZOLE SODIUM 40 MG: 40 TABLET, DELAYED RELEASE ORAL at 06:16

## 2021-11-17 RX ADMIN — PSYLLIUM HUSK 1 PACKET: 3.4 POWDER ORAL at 07:56

## 2021-11-17 RX ADMIN — INSULIN LISPRO 10 UNITS: 100 INJECTION, SOLUTION INTRAVENOUS; SUBCUTANEOUS at 12:17

## 2021-11-17 RX ADMIN — Medication 1.9 ML: at 18:50

## 2021-11-17 RX ADMIN — ACETAMINOPHEN 650 MG: 325 TABLET ORAL at 21:24

## 2021-11-17 RX ADMIN — INSULIN LISPRO 10 UNITS: 100 INJECTION, SOLUTION INTRAVENOUS; SUBCUTANEOUS at 07:55

## 2021-11-17 RX ADMIN — INSULIN LISPRO 9 UNITS: 100 INJECTION, SOLUTION INTRAVENOUS; SUBCUTANEOUS at 12:14

## 2021-11-17 ASSESSMENT — PAIN SCALES - GENERAL
PAINLEVEL_OUTOF10: 0
PAINLEVEL_OUTOF10: 0
PAINLEVEL_OUTOF10: 6
PAINLEVEL_OUTOF10: 0
PAINLEVEL_OUTOF10: 0

## 2021-11-17 NOTE — PROGRESS NOTES
Speech Language Pathology  Speech Language Pathology  [facility] Acute Rehab Unit at [facility]    Cognitive Treatment Note    Date: 11/17/2021  Patients Name: Taylor Cr  MRN: [id number]  Diagnosis:   Patient Active Problem List   Diagnosis Code    Atherosclerosis of coronary artery bypass graft of native heart without angina pectoris I25.810    Acute renal failure ([facility] [address] N17.9    Diabetes mellitus due to underlying condition with diabetic nephropathy, with long-term current use of insulin ([facility]) E08.21, Z79.4    Chronic diastolic heart failure ([facility]) I50.32    Diabetic polyneuropathy associated with type 2 diabetes mellitus ([facility]) E11.42    History of coronary artery bypass graft Z95.1    Iron deficiency anemia D50.9    Spinal stenosis of lumbar region with neurogenic claudication M48.062    Mixed hyperlipidemia E78.2    Stage 4 chronic kidney disease ([facility]) N18.4    Type 2 diabetes mellitus with kidney complication, with long-term current use of insulin ([facility]) E11.29, Z79.4    Syncope and collapse R55    Obesity, Class II, BMI 35-39.9 E66.9    Thyroid nodule greater than or equal to 1 cm in diameter incidentally noted on imaging study E04.1    Essential hypertension I10    Anemia in stage 4 chronic kidney disease ([facility]) N18.4, D63.1    Chronic ischemic heart disease I25.9    Ischemic stroke of frontal lobe ([facility]) I63.9    Stroke-like symptoms R29.90    Morbid obesity with BMI of 40.0-44.9, adult ([facility]) E66.01, Z68.41    Acute encephalopathy G93.40    Disequilibrium syndrome E87.8    Debility R53.81    Long-term memory loss R41.3    Muscle right arm weakness M62.81    Anxiety F41.9    Chronic midline low back pain with bilateral sciatica M54.41, M54.42, G89.29    Need for immunization against influenza Z23    Altered mental status R41.82       Pain: 0/10    Cognitive Treatment    Treatment time: 6811-0463      Subjective: [x] Alert [x] Cooperative     [] Confused     [] Agitated    [] Lethargic      Objective/Assessment:  Attention: functional     Orientation: 100%, long response latency at times. Recall: Pt. Recalling details from movie she watched the other day, recalling specific details from the last couple days. Organization: min cues needed to complete crossword puzzle. Problem Solving/Reasoning: state item in category beginning c given letter- 95%, long response latency demonstrated. Divergent naming- 12 items in 60 seconds, +7 c additional time and cues. Other:   Pt. Very pleasant and motivated, reports she will continue to complete homework given to her by  yesterday. Plan:  [x] Continue  services    [] Discharge from :      Discharge recommendations: []  Further therapy recommended at discharge. The patient should be able to tolerate at least 3 hours of therapy per day over 5 days or 15 hours over 7 days. [] Further therapy recommended at discharge. [] No therapy recommended at discharge. Treatment completed by: Genesis Gutierrez A.CCC/SLP

## 2021-11-17 NOTE — PROGRESS NOTES
Physical Medicine & Rehabilitation  Progress Note      Subjective:      Luzma Henderson is a 59 y.o. female with recrudescence of right-sided and cognitive stroke symptoms. She reports doing fine today. She notes ongoing hypersensitivity in the right lower limb. She denies any other acute concerns. Discussed increasing buspar medication for anxiety as well as higher risk of side effects in the setting of AURELIA on CKD - patient expressed understanding and would like to try increasing the dose. ROS:  Denies fevers, chills, sweats. No chest pain, palpitations, lightheadedness. Denies coughing, wheezing or shortness of breath. Denies abdominal pain, nausea, diarrhea or constipation. No new areas of joint pain. Denies new areas of numbness or weakness. Denies new anxiety or depression issues. No new skin problems. Rehabilitation:   Progressing in therapies. PT:  Restrictions/Precautions: Contact Precautions, Fall Risk  Implants present? :  (pt denies)  Other position/activity restrictions: Hemodialysis MWF  Required Braces or Orthoses  Right Lower Extremity Brace:  (lymphedema wraps; not present today)  Left Lower Extremity Brace:  (lymphedema wraps; not present today)   Transfers  Sit to Stand: Stand by assistance  Stand to sit: Stand by assistance  Bed to Chair: Stand by assistance  Stand Pivot Transfers: Stand by assistance  Comment: Rolling walker. Pt demonstrates safe techniques. Ambulation 1  Surface: level tile  Device: Rolling Walker  Assistance: Contact guard assistance (Progressing to SBA)  Quality of Gait: Slow, steady pace. Minimal R foot clearance of floor but demonstrating fair heel strike. Gait Deviations: Slow Annie, Increased ELISABET, Decreased step length, Decreased step height  Distance: 20' & 7' AM; 160' PM   Comments: Pt determining distance, denied need to turn earlier despite 2 cues, states on way back \"I should have turned sooner\";  Required one standing rest break, briefly resting forearms on walker. Transfers  Sit to Stand: Stand by assistance  Stand to sit: Stand by assistance  Bed to Chair: Stand by assistance  Stand Pivot Transfers: Stand by assistance  Comment: Rolling walker. Pt demonstrates safe techniques. Ambulation  Ambulation?: Yes  More Ambulation?: Yes  Ambulation 1  Surface: level tile  Device: Rolling Walker  Assistance: Contact guard assistance (Progressing to SBA)  Quality of Gait: Slow, steady pace. Minimal R foot clearance of floor but demonstrating fair heel strike. Gait Deviations: Slow Annie, Increased ELISABET, Decreased step length, Decreased step height  Distance: 20' & 7' AM; 160' PM   Comments: Pt determining distance, denied need to turn earlier despite 2 cues, states on way back \"I should have turned sooner\"; Required one standing rest break, briefly resting forearms on walker. Surface: level tile  Ambulation 1  Surface: level tile  Device: Rolling Walker  Assistance: Contact guard assistance (Progressing to SBA)  Quality of Gait: Slow, steady pace. Minimal R foot clearance of floor but demonstrating fair heel strike. Gait Deviations: Slow Annie, Increased ELISABET, Decreased step length, Decreased step height  Distance: 20' & 7' AM; 160' PM   Comments: Pt determining distance, denied need to turn earlier despite 2 cues, states on way back \"I should have turned sooner\"; Required one standing rest break, briefly resting forearms on walker.      OT:  ADL  Feeding: Setup  Grooming: Contact guard assistance (Assist for hair wash shower cap donning, otherwise occasional cuing)  UE Bathing: Setup  LE Bathing: Minimal assistance (Nursing performed stalin-care after toileting before OT session)  UE Dressing: Setup  LE Dressing: Setup  Toileting: Minimal assistance (per report)  Additional Comments: Washed at sink as she had a shower yesterday         Balance  Sitting Balance: Modified independent   Standing Balance: Contact guard assistance   Standing Balance  Time: 1-2 mintues x 5  Activity: functional transfers/mobility, toileting, lower body bathing/dressing  Comment: with RW  Functional Mobility  Functional - Mobility Device: Rolling Walker  Activity: To/from bathroom  Assist Level: Contact guard assistance  Functional Mobility Comments: Verbal cues for hand placement and safety     Bed mobility  Bridging: Stand by assistance  Rolling to Left: Supervision  Rolling to Right: Stand by assistance  Supine to Sit: Supervision (elevated Head of bed)  Sit to Supine: Stand by assistance (Assist level reported by Speech Therapist)  Scooting: Minimal assistance (Bracing R foot to scoot HOB; Pt using rails, bridging)  Comment: Pt performs bed mobility with HOB slightly elevated and minimal use of bed rails. Transfers  Sit to stand: Contact guard assistance  Stand to sit: Contact guard assistance  Transfer Comments: Verbal cues for hand placement and safety   Toilet Transfers  Toilet - Technique: Ambulating  Equipment Used: Grab bars  Toilet Transfer: Contact guard assistance  Toilet Transfers Comments: Verbal cues for hand placement and safety     Shower Transfers  Shower - Transfer To: Shower seat with back  Shower - Technique: Ambulating  Shower Transfers: Minimal assistance  Shower Transfers Comments: OT assisted pt in backing up to shower with RW and utilizing grab bars to assist with back steping over threshold and sitting on shower chair. Pt demonstrated good safety with increased time to complete shower transfer. SPEECH:  Subjective: [x]? Alert     [x]? Cooperative     []? Confused     []? Agitated    []? Lethargic        Objective/Assessment:  Attention: functional      Orientation: 100%, long response latency at times.      Recall: Pt.  Recalling details from movie she watched the other day, recalling specific details from the last couple days.      Organization: min cues needed to complete crossword puzzle.       Problem Solving/Reasoning: state item in category beginning c given letter- 95%, long response latency demonstrated. Divergent naming- 12 items in 60 seconds, +7 c additional time and cues.      Other:   Pt. Very pleasant and motivated, reports she will continue to complete homework given to her by  yesterday.       Current Medications:   Current Facility-Administered Medications: busPIRone (BUSPAR) tablet 10 mg, 10 mg, Oral, TID  insulin glargine (LANTUS) injection vial 70 Units, 70 Units, SubCUTAneous, BID  anticoagulant sodium citrate 4 % injection 1.9 mL, 1.9 mL, IntraCATHeter, PRN **AND** anticoagulant sodium citrate 4 % injection 1.9 mL, 1.9 mL, IntraCATHeter, PRN  insulin lispro (HUMALOG) injection vial 10 Units, 10 Units, SubCUTAneous, TID WC  0.9 % sodium chloride infusion, 25 mL, IntraVENous, PRN  ondansetron (ZOFRAN-ODT) disintegrating tablet 4 mg, 4 mg, Oral, Q8H PRN **OR** [DISCONTINUED] ondansetron (ZOFRAN) injection 4 mg, 4 mg, IntraVENous, Q6H PRN  dextrose 5 % solution, 100 mL/hr, IntraVENous, PRN  dextrose 50 % IV solution, 12.5 g, IntraVENous, PRN  glucagon (rDNA) injection 1 mg, 1 mg, IntraMUSCular, PRN  glucose (GLUTOSE) 40 % oral gel 15 g, 15 g, Oral, PRN  apixaban (ELIQUIS) tablet 5 mg, 5 mg, Oral, BID  aspirin chewable tablet 81 mg, 81 mg, Oral, Daily  atorvastatin (LIPITOR) tablet 80 mg, 80 mg, Oral, Daily  carvedilol (COREG) tablet 6.25 mg, 6.25 mg, Oral, BID  docusate sodium (COLACE) capsule 100 mg, 100 mg, Oral, BID  febuxostat (ULORIC) tablet 40 mg, 40 mg, Oral, Daily  ferrous sulfate (IRON 325) tablet 325 mg, 325 mg, Oral, BID WC  furosemide (LASIX) tablet 80 mg, 80 mg, Oral, BID  gabapentin (NEURONTIN) capsule 300 mg, 300 mg, Oral, BID  insulin lispro (HUMALOG) injection vial 0-18 Units, 0-18 Units, SubCUTAneous, TID WC  insulin lispro (HUMALOG) injection vial 0-9 Units, 0-9 Units, SubCUTAneous, Nightly  lactulose (CHRONULAC) 10 GM/15ML solution 20 g, 20 g, Oral, TID PRN  pantoprazole (PROTONIX) tablet 40 mg, 40 mg, Oral, QAM AC  psyllium (METAMUCIL) 58.12 % packet 1 packet, 1 packet, Oral, Daily  ranolazine (RANEXA) extended release tablet 1,000 mg, 1,000 mg, Oral, BID  sodium chloride flush 0.9 % injection 10 mL, 10 mL, IntraVENous, PRN  tamsulosin (FLOMAX) capsule 0.4 mg, 0.4 mg, Oral, Daily  traZODone (DESYREL) tablet 75 mg, 75 mg, Oral, Nightly  acetaminophen (TYLENOL) tablet 650 mg, 650 mg, Oral, Q4H PRN  senna (SENOKOT) tablet 17.2 mg, 2 tablet, Oral, Daily PRN  bisacodyl (DULCOLAX) suppository 10 mg, 10 mg, Rectal, Daily PRN      Objective:  BP (!) 160/67   Pulse 73   Temp 98.2 °F (36.8 °C)   Resp 16   Ht 5' 5\" (1.651 m)   Wt 242 lb 1 oz (109.8 kg)   SpO2 97%   BMI 40.28 kg/m²       GEN: Well developed, well nourished, no acute distress  HEENT: NCAT. EOMI. Hearing grossly intact. Mucous membranes pink and moist.  RESP: Normal breath sounds with no wheezing, rales, or rhonchi. Respirations WNL and unlabored. CV: Regular rate and rhythm. No murmurs, rubs, or gallops. ABD: Soft, non-distended, BS+ and equal.  NEURO: Alert. Speech fluent. Hypersensitivity to light touch in the right lower limb. MSK:  Muscle tone and bulk are normal bilaterally. Strength 4/5 in bilateral upper limbs. Strength 4+/5 with bilateral ankle dorsiflexion and plantarflexion. LIMBS:  Edema present in bilateral lower limbs. SKIN: Warm and dry with good turgor. PSYCH: Mood WNL. Affect WNL. Appropriately interactive. Diagnostics:     CBC:   Recent Labs     11/15/21  0847 11/17/21  0920   WBC 5.6 6.5   RBC 3.53* 3.53*   HGB 11.8* 11.7*   HCT 34.0* 34.5*   MCV 96.4 97.8   RDW 14.9 14.9    140*     BMP:   Recent Labs     11/15/21  0847 11/17/21  0920   * 137   K 4.9 4.5   CL 99 102   CO2 24 22   BUN 49* 50*   CREATININE 3.20* 3.15*   GLUCOSE 365* 285*     BNP: No results for input(s): BNP in the last 72 hours. PT/INR: No results for input(s): PROTIME, INR in the last 72 hours. APTT: No results for input(s):  APTT in the last 72 hours. CARDIAC ENZYMES: No results for input(s): CKMB, CKMBINDEX, TROPONINT in the last 72 hours. Invalid input(s): CKTOTAL;3  FASTING LIPID PANEL:  Lab Results   Component Value Date    CHOL 105 04/09/2015    HDL 43 04/09/2015    TRIG 168 04/09/2015     LIVER PROFILE: No results for input(s): AST, ALT, ALB, BILIDIR, BILITOT, ALKPHOS in the last 72 hours. Impression/Plan:   Impaired ADLs, gait, and mobility due to:    1. 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 SLP for cognition. On aspirin, eliquis, and atorvastatin. Follow up with neurology. 2. Anemia:  Hemoglobin 11.7 on 11/17, stable. Monitoring. On iron supplementation. 3. Eschar on left great toe:  Wound care following. Podiatry consulted by IM. 4. AURELIA on CKD: On hemodialysis MWF. Nephrology consulted. Per notes, she is scheduled for AV fistula placement in 2 weeks (Dr. Lico Markham). 5. CHF, HTN:  On carvedilol, furosemide  6. Type 2 diabetes with neuropathy:  On lantus BID - IM increased dose on 11/17, humalog TID + sliding scale. Has gabapentin BID  7. Insomnia:  On trazodone nightly  8. Adjustment disorder with anxiety:  Psychiatry consulted in acute care - recommended titration of buspar to 10mg TID, discussed with patient today and increased the dose; also recommended consideration of an acetylcholinesterase inhibitor. 9. Gout:  On febuxostat  10. Obesity:  BMI 41.02  11. Bowel Management: Docusate BID, metamucil daily, senokot prn, dulcolax prn. 12. DVT Prophylaxis:  On eliquis  13.  Internal Medicine for medical management      Electronically signed by Angela Soto MD on 11/17/2021 at 10:46 PM

## 2021-11-17 NOTE — PROGRESS NOTES
Dialysis Safety Checks:    Patient ID Verified (Y/N) Y     -Hepatitis Test                   Date      Result  Hepatitis B Surface Antigen   21 Negative     Hepatitis B Surface Antibody 21 Positive =48.5     Hepatitis B Core Antibody  21 Negative     -Treatment Initiation  Blood Vol Processed Goal (Liters)  Pump Speed x Treatment Hours x 60 Minutes    Target Fluid Removal 2000 ml     * Intra-treatment documented Safety Checks include the followin) Access and face visible at all times. 2) All connections and blood lines are secure with no kinks. 3) NVL alarm engaged. 4) Hemosafe device applied (if applicable). 5) No collapse of Arterial or Venous blood chambers. 6) All blood lines / pump segments in the air detectors.   --------------------------------------------------------------------------------    Report received from [address] RN

## 2021-11-17 NOTE — CONSULTS
[facility] Wound Ostomy Continence Nurse  Consult Note       NAME:  Cornelius Fuentes  MEDICAL RECORD NUMBER:  [id number]  AGE: 59 y.o.    GENDER: female  : 1960  TODAY'S DATE:  2021    Subjective:     Reason for Wound Agatha Osorio Care and Assessment: ______________________      Cornelius Fuentes is a 59 y.o. female referred by:   [] Physician  [] Nursing  [] Other:       Wound Identification:  Wound Type: diabetic  Contributing Factors: edema, lymphedema and diabetes    Wound History: the patient is unsure how long this has been present, states just found out she had it when staff here told her about it  Current Wound Care Treatment:  Open to air    Patient Goal of Care:  [] Wound Healing  [] Odor Control  [] Palliative Care  [] Pain Control   [x] Other: infection prevention        PAST MEDICAL HISTORY        Diagnosis Date    Backache, unspecified     CHF (congestive heart failure) ([facility] [address]     Chronic kidney disease     Coronary atherosclerosis of artery bypass graft     Cramp of limb     Gallstones     Hypertension     Insomnia     Pneumonia     Type II or unspecified type diabetes mellitus with renal manifestations, not stated as uncontrolled(250.40)     Type II or unspecified type diabetes mellitus without mention of complication, not stated as uncontrolled     Unspecified vitamin D deficiency        PAST SURGICAL HISTORY    Past Surgical History:   Procedure Laterality Date    ANKLE FRACTURE SURGERY      BREAST SURGERY      CARPAL TUNNEL RELEASE      CHOLECYSTECTOMY, OPEN N/A     CORONARY ARTERY BYPASS GRAFT      x3    HAND SURGERY      IR TUNNELED CATHETER PLACEMENT GREATER THAN 5 YEARS  2021    IR TUNNELED CATHETER PLACEMENT GREATER THAN 5 YEARS 2021 [facility]    KNEE ARTHROSCOPY      right    TONSILLECTOMY         FAMILY HISTORY    Family History   Problem Relation Age of Onset    Diabetes Father     Heart Failure Father        SOCIAL HISTORY    Social History Tobacco Use    Smoking status: Never Smoker    Smokeless tobacco: Never Used   Vaping Use    Vaping Use: Never used   Substance Use Topics    Alcohol use: No    Drug use: No       ALLERGIES    Allergies   Allergen Reactions    Adhesive Tape Other (See Comments) and Rash     Other reaction(s): Unknown    Ace Inhibitors Other (See Comments)     cough    Iv Dye [Iodides]      Stage 4 kidney disease    Metformin And Related Hives, Itching and Rash           Objective:      BP (!) 142/59   Pulse 64   Temp 98.2 °F (36.8 °C) (Oral)   Resp 18   Ht 5' 5\" (1.651 m)   Wt 246 lb 7.6 oz (111.8 kg)   SpO2 92%   BMI 41.02 kg/m²       LABS    CBC:   Lab Results   Component Value Date    WBC 6.5 11/17/2021    RBC 3.53 11/17/2021    HGB 11.7 11/17/2021     CMP:  Albumin:    Lab Results   Component Value Date    LABALBU 3.4 09/17/2021     PT/INR:    Lab Results   Component Value Date    PROTIME 12.7 11/10/2021    PROTIME 16.4 08/27/2015    PROTIME 16.4 08/27/2015    INR 0.9 11/10/2021     HgBA1c:    Lab Results   Component Value Date    LABA1C 8.9 03/15/2021     PTT: No components found for: LABPTT    Review of Systems    Assessment:     Physical Exam      Rashel Risk Score: Rashel Scale Score: 21    Patient Active Problem List   Diagnosis Code    Atherosclerosis of coronary artery bypass graft of native heart without angina pectoris I25.810    Acute renal failure (HCC) N17.9    Diabetes mellitus due to underlying condition with diabetic nephropathy, with long-term current use of insulin ([facility]) E08.21, Z79.4    Chronic diastolic heart failure (HCC) I50.32    Diabetic polyneuropathy associated with type 2 diabetes mellitus ([facility]) E11.42    History of coronary artery bypass graft Z95.1    Iron deficiency anemia D50.9    Spinal stenosis of lumbar region with neurogenic claudication M48.062    Mixed hyperlipidemia E78.2    Stage 4 chronic kidney disease (HCC) N18.4    Type 2 diabetes mellitus with kidney complication, with long-term current use of insulin ([facility]) E11.29, Z79.4    Syncope and collapse R55    Obesity, Class II, BMI 35-39.9 E66.9    Thyroid nodule greater than or equal to 1 cm in diameter incidentally noted on imaging study E04.1    Essential hypertension I10    Anemia in stage 4 chronic kidney disease (HCC) N18.4, D63.1    Chronic ischemic heart disease I25.9    Ischemic stroke of frontal lobe (HCC) I63.9    Stroke-like symptoms R29.90    Morbid obesity with BMI of 40.0-44.9, adult ([facility]) E66.01, Z68.41    Acute encephalopathy G93.40    Disequilibrium syndrome E87.8    Debility R53.81    Long-term memory loss R41.3    Muscle right arm weakness M62.81    Anxiety F41.9    Chronic midline low back pain with bilateral sciatica M54.41, M54.42, G89.29    Need for immunization against influenza Z23    Altered mental status R41.82       Patient Active Problem List   Diagnosis    Atherosclerosis of coronary artery bypass graft of native heart without angina pectoris    Acute renal failure (HCC)    Diabetes mellitus due to underlying condition with diabetic nephropathy, with long-term current use of insulin (HCC)    Chronic diastolic heart failure (HCC)    Diabetic polyneuropathy associated with type 2 diabetes mellitus ([facility])    History of coronary artery bypass graft    Iron deficiency anemia    Spinal stenosis of lumbar region with neurogenic claudication    Mixed hyperlipidemia    Stage 4 chronic kidney disease (HCC)    Type 2 diabetes mellitus with kidney complication, with long-term current use of insulin (HCC)    Syncope and collapse    Obesity, Class II, BMI 35-39.9    Thyroid nodule greater than or equal to 1 cm in diameter incidentally noted on imaging study    Essential hypertension    Anemia in stage 4 chronic kidney disease (HCC)    Chronic ischemic heart disease    Ischemic stroke of frontal lobe (HCC)    Stroke-like symptoms    Morbid obesity with BMI of 40.0-44.9, adult ([facility] [address]    Acute encephalopathy    Disequilibrium syndrome    Debility    Long-term memory loss    Muscle right arm weakness    Anxiety    Chronic midline low back pain with bilateral sciatica    Need for immunization against influenza    Altered mental status       Measurements:  Wound 08/11/21 Buttocks Right;Posterior (Active)   Number of days: 97       Wound 11/16/21 Toe (Comment  which one) Left; Medial great toe (Active)   Wound Image   11/17/21 1416   Wound Etiology Other 11/17/21 1416   Dressing Status New dressing applied; Old drainage noted 11/17/21 1416   Wound Cleansed Not Cleansed 11/17/21 1205   Dressing/Treatment Open to air 11/17/21 1416   Wound Length (cm) 1.5 cm 11/17/21 1416   Wound Width (cm) 1.2 cm 11/17/21 1416   Wound Depth (cm) 0.1 cm 11/17/21 1416   Wound Surface Area (cm^2) 1.8 cm^2 11/17/21 1416   Wound Volume (cm^3) 0.18 cm^3 11/17/21 1416   Wound Assessment Eschar dry 11/17/21 1416   Drainage Amount None 11/17/21 1416   Odor None 11/17/21 1416   Elena-wound Assessment Intact 11/17/21 1416   Margins Attached edges; Defined edges 11/17/21 1416   Number of days: 1     Left toe with dry eschar- no drainage, no signs of infection. Appears to be dry stable eschar. Recommend close monitoring. Response to treatment:  Well tolerated by patient. Plan:     Plan of Care:       -Left great toe dry eschar- would leave open to air and monitoring with daily assessments by nursing for signs of infection. -[facility] nursing will follow case. Please call or [employer] with and questions or concerns    -Pt encouraged to have lymphatic pump brought in to be used while admitted in order to prevent lymphedema from worsening. Will need order from attending and equipment check prior to use. -Turn every 2 hours    -Float heels off of bed with pillows under calves    -Sacral foam dressing to sacrococcygeal area. Peel back dressing, inspect skin beneath, re-secure.  Change every 72 hours and prn wrinkles, soilage. Discontinue if repeatedly soiled by incontinence.     -Routine incontinence care with foaming cleanser and zinc oxide cream. Apply zinc oxide cream BID and prn incontinence. Use moisture wicking under pads vs cloth backed briefs    -Static air overlay. Check inflation each shift by sliding hand under the air overlay. Feel for the patient's heaviest/ most dependant body part. Ideally 1/2 to 1\" of air will be between your hand and the patient's body. Add air prn. Specialty Bed Required : Yes   [] Low Air Loss   [x] Pressure Redistribution  [] Fluid Immersion  [] Bariatric  [] Total Pressure Relief  [] Other:     Current Diet: ADULT DIET; Regular; 4 carb choices (60 gm/meal);  No Added Salt (3-4 gm)     Discharge Plan:  Placement for patient upon discharge: home with support   Patient appropriate for Outpatient [address]: N/A    Patient/Caregiver Teaching:  Level of patientunderstanding able to:     [] Indicates understanding       [] Needs reinforcement  [] Unsuccessful      [] Verbal Understanding  [] Demonstrated understanding       [] No evidence of learning  [] Refused teaching         [] N/A       Electronically signed by Lisandra George RN on  11/17/2021 at 3:05 PM

## 2021-11-17 NOTE — FLOWSHEET NOTE
11/17/21 1408   Encounter Summary   Services provided to: Patient   Referral/Consult From: [facility]   (11/17/21V)   Volunteer Visit Yes   Complexity of Encounter Low   Length of Encounter 15 minutes   Spiritual/Protestant   Type Spiritual support   Intervention Communion; Prayer   Sacraments   Communion Patient received communion

## 2021-11-17 NOTE — PLAN OF CARE
Problem: Skin Integrity:  Goal: Will show no infection signs and symptoms  Description: Will show no infection signs and symptoms  Outcome: Ongoing  Goal: Absence of new skin breakdown  Description: Absence of new skin breakdown  Outcome: Ongoing     Problem: Falls - Risk of:  Goal: Will remain free from falls  Description: Will remain free from falls  Outcome: Ongoing  Goal: Absence of physical injury  Description: Absence of physical injury  Outcome: Ongoing     Problem: Neurological  Goal: Maximum potential motor/sensory/cognitive function  Outcome: Ongoing     Problem: Nutrition  Goal: Optimal nutrition therapy  Outcome: Ongoing

## 2021-11-17 NOTE — PROGRESS NOTES
[facility]: [facility]   ACUTE REHABILITATION OCCUPATIONAL THERAPY  DAILY NOTE    Date: 21  Patient Name: Scott Mccarthy      Room: 2836/1663-10    MRN: [id number]   : 1960  (59 y.o.)  Gender: female   Referring Practitioner: Ulysses Hones, MD  Diagnosis: Stroke-Like Symptoms       Restrictions  Restrictions/Precautions: Contact Precautions, Fall Risk  Implants present? :  (pt denies)  Other position/activity restrictions: Hemodialysis MWF  Required Braces or Orthoses  Right Lower Extremity Brace:  (lymphadema wraps)  Left Lower Extremity Brace:  (lymphadema wraps)  Required Braces or Orthoses?: Yes    Subjective  Subjective: ALl energized to get the day started  Comments: Pt pleasant, conversant  and agreeable to occupational therapy  Patient Currently in Pain: Denies  Restrictions/Precautions: Contact Precautions; Fall Risk  Orientation Level: Oriented to person; Disoriented to time; Oriented to place; Oriented to situation          Objective     Balance  Sitting Balance: Modified independent   Standing Balance: Contact guard assistance  Bed mobility  Rolling to Left: Supervision  Supine to Sit: Supervision (elevated Head of bed)  Scooting: Supervision  Comment: Pt performs bed mobility with HOB slightly elevated and minimal use of bed rails. Transfers  Sit to stand: Contact guard assistance     Functional Mobility  Functional - Mobility Device: Rolling Walker  Activity: To/from bathroom  Assist Level: Contact guard assistance     Type of ROM/Therapeutic Exercise  Comment: Resistive therapy sponges provided for hand conditioning: low and moderate, Medium and High resitance sponges added on this date.   Various finger tasks were demonstrated and Independently performed by patient                       ADL  Feeding: Setup  Grooming: Contact guard assistance (Assist for hair wash shower cap donning, otherwise occasional cuing)  UE Bathing: Setup  LE Bathing: Minimal assistance (Nursing performed stalin-care after toileting before OT session)  UE Dressing: Setup  LE Dressing: Setup  Toileting: Minimal assistance (per report)  Additional Comments: Washed at sink as she had a shower yesterday          Assessment  Performance deficits / Impairments: Decreased ADL status; Decreased functional mobility ; Decreased strength; Decreased safe awareness; Decreased cognition; Decreased endurance; Decreased sensation; Decreased high-level IADLs; Decreased coordination  Activity Tolerance: Patient Tolerated treatment well  Type of devices: Call light within reach; Left in chair          Patient Education:  Patient Goals   Patient goals : \"I would like to get my hand and right arm going. My memory back. Just being able to walk around and get my right leg going\"  Learner:patient  Method: demonstration and explanation       Outcome: needs reinforcement and asked questions   OT Education  OT Education: Energy Conservation; Home Exercise Program    Plan  Plan  Times per week: 900 minutes combined between OT/PT/SLP   Times per day: Twice a day  Current Treatment Recommendations: Self-Care / ADL, Strengthening, ROM, Balance Training, Functional Mobility Training, Endurance Training, Cognitive Reorientation, Safety Education & Training, Patient/Caregiver Education & Training, Equipment Evaluation, Education, & procurement, Home Management Training, Cognitive/Perceptual Training  Patient Goals   Patient goals : \"I would like to get my hand and right arm going. My memory back.  Just being able to walk around and get my right leg going\"  Short term goals  Time Frame for Short term goals: By 4-5 days  Short term goal 1: Pt will complete lower body dressing/bathing with CGA and good safety with use of AE as needed  Short term goal 2: Pt will complete upper body dressing with set-up assistance and good safety/attention to task  Short term goal 3: Pt will complete functional transfers/mobility during self care tasks with SBA and good safety with use of least restrictive device  Short term goal 4: Pt will tolerate standing 5+ minutes during functional activity of choice with good safety  Short term goal 5: Pt will participate in 30+ minutes of therapeutic exercises/functional activities to increase safety and independence with self care and mobility  Long term goals  Time Frame for Long term goals : By discharge  Long term goal 1: Pt will complete BADLs with modified independence and good safety with use of AE as needed.    Long term goal 2: Pt will complete functional transfers/mobility during self care tasks with modified independence and good safety with use of least restrictive device  Long term goal 3: Pt will tolerate standing 10+ minutes during functional activity of choice with good safety  Long term goal 4: Pt will verbalize/demonstrate good understanding of home safety/fall prevention strategies to increase safety and independence with self care and mobiliyt  Long term goal 5: Pt will complete simple meal prep/light house keeping task with supervision and good safety         11/17/21 0905   OT Individual Minutes   Time In [facility]   Minutes 63     Electronically signed by Johnson Krause OT on 11/17/21 at 5:01 PM EST

## 2021-11-17 NOTE — CONSULTS
deficiency         Past Surgical History:     Past Surgical History:   Procedure Laterality Date    ANKLE FRACTURE SURGERY      BREAST SURGERY      CARPAL TUNNEL RELEASE      CHOLECYSTECTOMY, OPEN N/A     CORONARY ARTERY BYPASS GRAFT      x3    HAND SURGERY      IR TUNNELED CATHETER PLACEMENT GREATER THAN 5 YEARS  8/18/2021    IR TUNNELED CATHETER PLACEMENT GREATER THAN 5 YEARS 8/18/2021 [facility]    KNEE ARTHROSCOPY      right    TONSILLECTOMY          Medications Prior to Admission:     Prior to Admission medications    Medication Sig Start Date End Date Taking? Authorizing Provider   atorvastatin (LIPITOR) 80 MG tablet Take 1 tablet by mouth daily 11/3/21  Yes AFSANEH Salazar CNP   aspirin 81 MG chewable tablet Take 1 tablet by mouth daily 9/25/21  Yes Hailye Tse MD   busPIRone (BUSPAR) 7.5 MG tablet Take 1 tablet by mouth 3 times daily 9/25/21  Yes Hailey Tse MD   ELIQUIS 5 MG TABS tablet Take 1 tablet by mouth 2 times daily 9/25/21  Yes Hailey Tse MD   pantoprazole (PROTONIX) 40 MG tablet Take 1 tablet by mouth every morning (before breakfast) 9/25/21  Yes Hailey Tse MD   acetaminophen (TYLENOL) 325 MG tablet Take 2 tablets by mouth every 4 hours as needed for Pain 8/25/21  Yes Hailey Tse MD   tamsulosin [facility]) 0.4 MG capsule Take 1 capsule by mouth daily 11/10/21   AFSANEH Salazar CNP   ranolazine (RANEXA) 1000 MG extended release tablet Take 1 tablet by mouth 2 times daily 9/25/21   Hailey Tse MD   gabapentin (NEURONTIN) 300 MG capsule Take 1 capsule by mouth 2 times daily for 30 days.  9/25/21 11/10/21  Hailey Tse MD   traZODone (DESYREL) 50 MG tablet Take 1.5 tablets by mouth nightly 9/25/21   Hailey Tse MD   insulin glargine [facility]) 100 UNIT/ML injection pen Inject 53 Units into the skin 2 times daily 9/25/21   Hailey Tse MD   carvedilol (COREG) 6.25 MG tablet Take 1 tablet by mouth 2 times daily 9/25/21 Renay Sherman MD   furosemide (LASIX) 80 MG tablet Take 1 tablet by mouth 2 times daily 9/25/21   Renay Sherman MD   febuxostat (ULORIC) 40 MG TABS tablet Take 1 tablet by mouth daily 9/25/21   Renay Sherman MD   Insulin Pen Needle (BD ULTRA-FINE PEN NEEDLES) 29G X 12.7MM MISC Use one needle for each insulin injection. 9/25/21   Renay Sherman MD   insulin lispro, 1 Unit Dial, (HUMALOG KWIKPEN) 100 UNIT/ML SOPN Per sliding scale:  If <139  No Insulin; 140-199  2 Units; 200-249  4 Units; 250-299  6 Units; 300-349  8 Units; 350-400  10 Units; Above 400  12 Units 9/25/21   Renay Sherman MD   blood glucose test strips (DEN CONTOUR TEST) strip 1 each by In Vitro route 3 times daily 8/25/21   Renay Sherman MD   ferrous sulfate (BRIAN-ARCHIE) 325 (65 Fe) MG tablet Take 1 tablet by mouth 2 times daily (with meals) 8/25/21   Renay Sherman MD   senna (SENOKOT) 8.6 MG tablet Take 2 tablets by mouth daily as needed (Constipation) 8/25/21   Renay Sherman MD   Alcohol Swabs 70 % PADS Use an alcohol swab to cleanse the skin before checking your blood sugar and before each insulin injection. 8/25/21   Renay Sherman MD   sharps container 1 each by Does not apply route as needed (dirty pen needles) 4/19/21   AFSANEH Short CNP   allopurinol (ZYLOPRIM) 100 MG tablet Take 1 tablet by mouth daily 4/14/21   AFSANEH Short CNP        Allergies:     Adhesive tape, Ace inhibitors, Iv dye [iodides], and Metformin and related    Social History:     Tobacco:    reports that she has never smoked. She has never used smokeless tobacco.  Alcohol:      reports no history of alcohol use. Drug Use:  reports no history of drug use. Family History:     Family History   Problem Relation Age of Onset    Diabetes Father     Heart Failure Father        Review of Systems:     Positive and Negative as described in HPI.     CONSTITUTIONAL:  negative for fevers, chills, sweats, fatigue, weight loss  HEENT:  negative for vision, hearing changes, runny nose, throat pain  RESPIRATORY:  negative for shortness of breath, cough, congestion, wheezing. CARDIOVASCULAR:  negative for chest pain, palpitations. GASTROINTESTINAL:  negative for nausea, vomiting, diarrhea, constipation, change in bowel habits, abdominal pain   GENITOURINARY:  negative for difficulty of urination, burning with urination, frequency   INTEGUMENT:  negative for rash, skin lesions, easy bruising   HEMATOLOGIC/LYMPHATIC:  negative for swelling/edema   ALLERGIC/IMMUNOLOGIC:  negative for urticaria , itching  ENDOCRINE:  negative increase in drinking, increase in urination, hot or cold intolerance  MUSCULOSKELETAL:  negative joint pains, muscle aches, swelling of joints  NEUROLOGICAL: Positive for dizziness, sensory change and weakness. Negative for headaches. Psychiatric/Behavioral: Positive for memory loss. BEHAVIOR/PSYCH:      Physical Exam:     BP (!) 161/74   Pulse 64   Temp 98.2 °F (36.8 °C) (Oral)   Resp 18   Ht 5' 5\" (1.651 m)   Wt 246 lb 7.6 oz (111.8 kg)   SpO2 92%   BMI 41.02 kg/m²   Temp (24hrs), Av.8 °F (36.6 °C), Min:97.3 °F (36.3 °C), Max:98.2 °F (36.8 °C)    Recent Labs     21  1646 21  0552 21  1207   POCGLU 292* 280* 148* 278*       Intake/Output Summary (Last 24 hours) at 2021 1659  Last data filed at 2021 1207  Gross per 24 hour   Intake 960 ml   Output --   Net 960 ml       General Appearance:  alert, well appearing, and in no acute distress  Mental status: oriented to person, place, and time with normal affect  Head:  normocephalic, atraumatic.   Eye: no icterus, redness, pupils equal and reactive, extraocular eye movements intact, conjunctiva clear  Ear: normal external ear, no discharge, hearing intact  Nose:  no drainage noted  Mouth: mucous membranes moist  Neck: supple, no carotid bruits, thyroid not palpable  Lungs: Bilateral equal air entry, clear to ausculation, no wheezing, rales or rhonchi, normal effort  Cardiovascular: normal rate, regular rhythm, no murmur, gallop, rub. Abdomen: Soft, nontender, nondistended, normal bowel sounds, no hepatomegaly or splenomegaly  Neurologic: There are no new focal motor or sensory deficits, normal muscle tone and bulk, no abnormal sensation, normal speech, cranial nerves II through XII grossly intact, mild weakness on right LE  Skin: No gross lesions, rashes, bruising or bleeding on exposed skin area  Extremities:  peripheral pulses palpable, no pedal edema or calf pain with palpation  Psych:      Investigations:      Laboratory Testing:  Recent Results (from the past 24 hour(s))   POC Glucose Fingerstick    Collection Time: 11/16/21  8:15 PM   Result Value Ref Range    POC Glucose 280 (H) 65 - 105 mg/dL   POC Glucose Fingerstick    Collection Time: 11/17/21  5:52 AM   Result Value Ref Range    POC Glucose 148 (H) 65 - 105 mg/dL   Basic Metabolic Panel w/ Reflex to MG    Collection Time: 11/17/21  9:20 AM   Result Value Ref Range    Glucose 285 (H) 70 - 99 mg/dL    BUN 50 (H) 8 - 23 mg/dL    CREATININE 3.15 (H) 0.50 - 0.90 mg/dL    Bun/Cre Ratio NOT REPORTED 9 - 20    Calcium 9.3 8.6 - 10.4 mg/dL    Sodium 137 135 - 144 mmol/L    Potassium 4.5 3.7 - 5.3 mmol/L    Chloride 102 98 - 107 mmol/L    CO2 22 20 - 31 mmol/L    Anion Gap 13 9 - 17 mmol/L    GFR Non-African American 15 (L) >60 mL/min    GFR  18 (L) >60 mL/min    GFR Comment          GFR Staging NOT REPORTED    CBC Auto Differential    Collection Time: 11/17/21  9:20 AM   Result Value Ref Range    WBC 6.5 3.5 - 11.0 k/uL    RBC 3.53 (L) 4.0 - 5.2 m/uL    Hemoglobin 11.7 (L) 12.0 - 16.0 g/dL    Hematocrit 34.5 (L) 36 - 46 %    MCV 97.8 80 - 100 fL    MCH 33.2 26 - 34 pg    MCHC 33.9 31 - 37 g/dL    RDW 14.9 11.5 - 14.9 %    Platelets 227 (L) 521 - 450 k/uL    MPV 8.6 6.0 - 12.0 fL    NRBC Automated NOT REPORTED per 100 WBC    Differential Type NOT REPORTED     Seg Neutrophils 74 (H) 36 - 66 %    Lymphocytes 15 (L) 24 - 44 %    Monocytes 7 1 - 7 %    Eosinophils % 3 0 - 4 %    Basophils 1 0 - 2 %    Immature Granulocytes NOT REPORTED 0 %    Segs Absolute 4.90 1.3 - 9.1 k/uL    Absolute Lymph # 1.00 1.0 - 4.8 k/uL    Absolute Mono # 0.50 0.1 - 1.3 k/uL    Absolute Eos # 0.20 0.0 - 0.4 k/uL    Basophils Absolute 0.00 0.0 - 0.2 k/uL    Absolute Immature Granulocyte NOT REPORTED 0.00 - 0.30 k/uL    WBC Morphology NOT REPORTED     RBC Morphology NOT REPORTED     Platelet Estimate NOT REPORTED    POC Glucose Fingerstick    Collection Time: 11/17/21 12:07 PM   Result Value Ref Range    POC Glucose 278 (H) 65 - 105 mg/dL           Consultations:   IP CONSULT TO DIETITIAN  IP CONSULT TO SOCIAL WORK  IP CONSULT TO NEPHROLOGY  IP CONSULT TO INTERNAL MEDICINE  IP CONSULT TO INTERNAL MEDICINE  IP CONSULT TO PODIATRY  Assessment :      Primary Problem  Stroke-like symptoms    Active Hospital Problems    Diagnosis Date Noted    Debility [R53.81] 09/19/2021    Morbid obesity with BMI of 40.0-44.9, adult ([facility] [E66.01, Z68.41] 09/14/2021    Stroke-like symptoms [R29.90] 09/13/2021    Essential hypertension [I10] 05/03/2021    Anemia in stage 4 chronic kidney disease ([facility] [N18.4, D63.1] 05/03/2021    Iron deficiency anemia [D50.9] 08/31/2020    Diabetic polyneuropathy associated with type 2 diabetes mellitus ([facility] [E11.42] 02/17/2020    Stage 4 chronic kidney disease ([facility] [N18.4] 08/06/2019    Chronic diastolic heart failure ([facility] [I50.32] 09/20/2018    Type 2 diabetes mellitus with kidney complication, with long-term current use of insulin ([facility] [E11.29, Z79.4] 09/20/2018    Mixed hyperlipidemia [E78.2] 07/27/2016    Diabetes mellitus due to underlying condition with diabetic nephropathy, with long-term current use of insulin ([address] [E08.21, Z79.4] 06/29/2015    Acute renal failure ([facility] [N17.9] 05/18/2015    Atherosclerosis of coronary artery bypass graft of native heart without angina pectoris [I25.810] 05/04/2015       Plan:     DM2, uncontrolled , , added premeal insulin 10 Units, Carb control diet   HTN, well controlled   HLD, home dose statins   Debility/CVA/ no new strokes on last MRI, PT/OT , deconditioning   Morbid obesity   DVt PPX   Left great toe area necrosis vs melanoma  Podiatry surg consult  uncontolled dm increase lantus to 70 bid        Jessi Ernst MD  11/17/2021  4:59 PM    Copy sent to Dr. Gerald Sanches, APRN - CNP    Please note that this chart was generated using voice recognition Dragon dictation software. Although every effort was made to ensure the accuracy of this automated transcription, some errors in transcription may have occurred.

## 2021-11-17 NOTE — PLAN OF CARE
Problem: Skin Integrity:  Goal: Will show no infection signs and symptoms  Description: Will show no infection signs and symptoms  11/17/2021 1548 by Clair Streeter RN  Outcome: Ongoing     Problem: Skin Integrity:  Goal: Absence of new skin breakdown  Description: Absence of new skin breakdown  11/17/2021 1548 by Clair Streeter RN  Outcome: Ongoing     Problem: Falls - Risk of:  Goal: Will remain free from falls  Description: Will remain free from falls  11/17/2021 1548 by Clair Streeter RN  Outcome: Ongoing     Problem: Falls - Risk of:  Goal: Absence of physical injury  Description: Absence of physical injury  11/17/2021 1548 by Clair Streeter RN  Outcome: Ongoing     Problem: Neurological  Goal: Maximum potential motor/sensory/cognitive function  11/17/2021 1548 by Clair Streeter RN  Outcome: Ongoing     Problem: Nutrition  Goal: Optimal nutrition therapy  11/17/2021 1548 by Clair Streeter RN  Outcome: Ongoing     Problem: Serum Glucose Level - Abnormal:  Goal: Ability to maintain appropriate glucose levels will improve  Description: Ability to maintain appropriate glucose levels will improve  Outcome: Ongoing

## 2021-11-17 NOTE — PROGRESS NOTES
Physical Therapy  [address]  Acute Rehabilitation Physical Therapy Progress Note    Date: 21  Patient Name: Carole Oglesby       Room: 6148/3493-95  MRN: [id number]   Account: [de-identified]   : 1960  (59 y.o.) Gender: female   Referring Practitioner: Everardo Waller MD  Diagnosis: Stroke-Like Symptoms  Past Medical History:  has a past medical history of Backache, unspecified, CHF (congestive heart failure) ([address] Chronic kidney disease, Coronary atherosclerosis of artery bypass graft, Cramp of limb, Gallstones, Hypertension, Insomnia, Pneumonia, Type II or unspecified type diabetes mellitus with renal manifestations, not stated as uncontrolled(250.40), Type II or unspecified type diabetes mellitus without mention of complication, not stated as uncontrolled, and Unspecified vitamin D deficiency. Past Surgical History:   has a past surgical history that includes Coronary artery bypass graft; Knee arthroscopy; Carpal tunnel release; Breast surgery; Tonsillectomy; Hand surgery; Ankle fracture surgery; Cholecystectomy, open (N/A); and IR TUNNELED CVC PLACE WO SQ PORT/PUMP > 5 YEARS (2021). Additional Pertinent Hx: This is a 59 y.o. female with a significant past medical history of Chronic atrial fibrillation on Eliquis, Type 2 diabetes mellitus, essential systemic hypertension, coronary artery disease s/p CABG in 2004 and PTCA in 2019, heart failure with preserved ejection fraction LVEF 55% and end-stage renal disease secondary to diabetic and hypertensive nephropathy on routine hemodialysis on a Monday/Wednesday/Friday schedule at [address] dialysis unit on [facility] under the care of Dr. Vandana Saini since 2001 using IJ tunneled dialysis catheter, who presented to the hospital for further evaluation of acute on chronic right-sided weakness, Slurred speech and confusion.   She was initially diagnosed with acute/subacute stroke in the left frontal lobe with right-sided Balance   Posture: Good  Sitting - Static: Good (Edge of bed, no back or UE support)  Sitting - Dynamic: Fair; + (Edge of bed, no back  support, B UE support)  Standing - Static: Fair; + (rolling walker)  Standing - Dynamic: Fair (rolling walker)     Exercises  Other exercises?: Yes  Other exercises 1: Seated bilateral LE exercises, x15, R LE active ROM, L LE 1.5#, lime (minimal+) resistance band. Other exercises 3: Sit <> stand x4  Other exercises 6: Supine heel slides, 10+5 reps each, active ROM R LE, 1.5# L LE  Other exercises 7: UB ergometer, 5 min. each forward and retro.      Activity Tolerance: Patient limited by fatigue, Patient Tolerated treatment well (Requires extra time: Slow moving with most activities. )    Current Treatment Recommendations: Strengthening, Balance Training, Functional Mobility Training, Transfer Training, Endurance Training, Gait Training, Home Exercise Program, Safety Education & Training, Patient/Caregiver Education & Training    Goals  Short term goals  Time Frame for Short term goals: 5 days  Short term goal 1: pt to demo all bed mobility with supervision to decrease burden of care  Short term goal 2: pt to demo all transfers with SBA using RW  Short term goal 3: pt to amb 48' with 2 turns using RW and SBA  Short term goal 4: pt to negotiate at least 1 step with UE support and SBA to allow for safe access into home shower  Long term goals  Time Frame for Long term goals : Until D/C  Long term goal 1: pt to demo all bed mobility independently to decrease burden of care  Long term goal 2: pt to demo all transfers with Mod I with RW  Long term goal 3: pt to amb 125' with RW on even surfaces and at least 10' uneven ramped surfaces with RW demonstrating Mod I   Long term goal 4: pt to demo improved RLE strength by 1/2 manual muscle grade to improve safety with functional mobility   Long term goal 5: pt to improve dynamic standing balance to fair + to improve safety with functional mobility   Long term goal 6: pt to improve PASS score to 30/36 to demo improved balance and safety with mobility        11/17/21 1116 11/17/21 1528   PT Individual Minutes   Time In 1100 1528   Time Out 1205 1555   Minutes 65 27     Electronically signed by Brittani Schroeder PTA on 11/17/21 at 6:48 PM EST

## 2021-11-18 LAB
GLUCOSE BLD-MCNC: 138 MG/DL (ref 65–105)
GLUCOSE BLD-MCNC: 213 MG/DL (ref 65–105)
GLUCOSE BLD-MCNC: 285 MG/DL (ref 65–105)
GLUCOSE BLD-MCNC: 311 MG/DL (ref 65–105)

## 2021-11-18 PROCEDURE — 6370000000 HC RX 637 (ALT 250 FOR IP): Performed by: INTERNAL MEDICINE

## 2021-11-18 PROCEDURE — 97110 THERAPEUTIC EXERCISES: CPT

## 2021-11-18 PROCEDURE — 6370000000 HC RX 637 (ALT 250 FOR IP): Performed by: FAMILY MEDICINE

## 2021-11-18 PROCEDURE — 97130 THER IVNTJ EA ADDL 15 MIN: CPT

## 2021-11-18 PROCEDURE — 99232 SBSQ HOSP IP/OBS MODERATE 35: CPT | Performed by: INTERNAL MEDICINE

## 2021-11-18 PROCEDURE — 97116 GAIT TRAINING THERAPY: CPT

## 2021-11-18 PROCEDURE — 1180000000 HC REHAB R&B

## 2021-11-18 PROCEDURE — 99231 SBSQ HOSP IP/OBS SF/LOW 25: CPT | Performed by: STUDENT IN AN ORGANIZED HEALTH CARE EDUCATION/TRAINING PROGRAM

## 2021-11-18 PROCEDURE — 6370000000 HC RX 637 (ALT 250 FOR IP): Performed by: STUDENT IN AN ORGANIZED HEALTH CARE EDUCATION/TRAINING PROGRAM

## 2021-11-18 PROCEDURE — 82947 ASSAY GLUCOSE BLOOD QUANT: CPT

## 2021-11-18 PROCEDURE — 97535 SELF CARE MNGMENT TRAINING: CPT

## 2021-11-18 PROCEDURE — 97530 THERAPEUTIC ACTIVITIES: CPT

## 2021-11-18 PROCEDURE — 97129 THER IVNTJ 1ST 15 MIN: CPT

## 2021-11-18 RX ORDER — INSULIN GLARGINE 100 [IU]/ML
73 INJECTION, SOLUTION SUBCUTANEOUS 2 TIMES DAILY
Status: DISCONTINUED | OUTPATIENT
Start: 2021-11-18 | End: 2021-11-23 | Stop reason: HOSPADM

## 2021-11-18 RX ADMIN — FUROSEMIDE 80 MG: 40 TABLET ORAL at 17:44

## 2021-11-18 RX ADMIN — INSULIN LISPRO 9 UNITS: 100 INJECTION, SOLUTION INTRAVENOUS; SUBCUTANEOUS at 08:48

## 2021-11-18 RX ADMIN — INSULIN LISPRO 10 UNITS: 100 INJECTION, SOLUTION INTRAVENOUS; SUBCUTANEOUS at 08:48

## 2021-11-18 RX ADMIN — TAMSULOSIN HYDROCHLORIDE 0.4 MG: 0.4 CAPSULE ORAL at 08:40

## 2021-11-18 RX ADMIN — PSYLLIUM HUSK 1 PACKET: 3.4 POWDER ORAL at 08:40

## 2021-11-18 RX ADMIN — INSULIN GLARGINE 73 UNITS: 100 INJECTION, SOLUTION SUBCUTANEOUS at 20:45

## 2021-11-18 RX ADMIN — ACETAMINOPHEN 650 MG: 325 TABLET ORAL at 05:54

## 2021-11-18 RX ADMIN — INSULIN GLARGINE 70 UNITS: 100 INJECTION, SOLUTION SUBCUTANEOUS at 08:44

## 2021-11-18 RX ADMIN — APIXABAN 5 MG: 5 TABLET, FILM COATED ORAL at 20:29

## 2021-11-18 RX ADMIN — INSULIN LISPRO 10 UNITS: 100 INJECTION, SOLUTION INTRAVENOUS; SUBCUTANEOUS at 12:30

## 2021-11-18 RX ADMIN — CARVEDILOL 6.25 MG: 6.25 TABLET, FILM COATED ORAL at 20:29

## 2021-11-18 RX ADMIN — FUROSEMIDE 80 MG: 40 TABLET ORAL at 08:39

## 2021-11-18 RX ADMIN — BUSPIRONE HYDROCHLORIDE 10 MG: 10 TABLET ORAL at 08:40

## 2021-11-18 RX ADMIN — ASPIRIN 81 MG: 81 TABLET, CHEWABLE ORAL at 08:40

## 2021-11-18 RX ADMIN — PANTOPRAZOLE SODIUM 40 MG: 40 TABLET, DELAYED RELEASE ORAL at 05:54

## 2021-11-18 RX ADMIN — GABAPENTIN 300 MG: 300 CAPSULE ORAL at 08:40

## 2021-11-18 RX ADMIN — INSULIN LISPRO 10 UNITS: 100 INJECTION, SOLUTION INTRAVENOUS; SUBCUTANEOUS at 17:45

## 2021-11-18 RX ADMIN — RANOLAZINE 1000 MG: 1000 TABLET, FILM COATED, EXTENDED RELEASE ORAL at 20:45

## 2021-11-18 RX ADMIN — CARVEDILOL 6.25 MG: 6.25 TABLET, FILM COATED ORAL at 08:47

## 2021-11-18 RX ADMIN — DOCUSATE SODIUM 100 MG: 100 CAPSULE ORAL at 20:29

## 2021-11-18 RX ADMIN — DOCUSATE SODIUM 100 MG: 100 CAPSULE ORAL at 08:40

## 2021-11-18 RX ADMIN — FERROUS SULFATE TAB 325 MG (65 MG ELEMENTAL FE) 325 MG: 325 (65 FE) TAB at 08:40

## 2021-11-18 RX ADMIN — INSULIN LISPRO 6 UNITS: 100 INJECTION, SOLUTION INTRAVENOUS; SUBCUTANEOUS at 14:38

## 2021-11-18 RX ADMIN — GABAPENTIN 300 MG: 300 CAPSULE ORAL at 20:29

## 2021-11-18 RX ADMIN — INSULIN LISPRO 6 UNITS: 100 INJECTION, SOLUTION INTRAVENOUS; SUBCUTANEOUS at 20:45

## 2021-11-18 RX ADMIN — FEBUXOSTAT 40 MG: 40 TABLET, FILM COATED ORAL at 08:42

## 2021-11-18 RX ADMIN — BUSPIRONE HYDROCHLORIDE 10 MG: 10 TABLET ORAL at 20:29

## 2021-11-18 RX ADMIN — ATORVASTATIN CALCIUM 80 MG: 80 TABLET, FILM COATED ORAL at 08:40

## 2021-11-18 RX ADMIN — TRAZODONE HYDROCHLORIDE 75 MG: 50 TABLET ORAL at 20:29

## 2021-11-18 RX ADMIN — RANOLAZINE 1000 MG: 1000 TABLET, FILM COATED, EXTENDED RELEASE ORAL at 08:42

## 2021-11-18 RX ADMIN — APIXABAN 5 MG: 5 TABLET, FILM COATED ORAL at 08:40

## 2021-11-18 RX ADMIN — BUSPIRONE HYDROCHLORIDE 10 MG: 10 TABLET ORAL at 15:31

## 2021-11-18 ASSESSMENT — PAIN SCALES - GENERAL
PAINLEVEL_OUTOF10: 4
PAINLEVEL_OUTOF10: 6

## 2021-11-18 NOTE — PROGRESS NOTES
HEMODIALYSIS POST TREATMENT NOTE    Treatment time ordered: 150    Actual treatment time: 150    UltraFiltration Goal: 2500  UltraFiltration Removed:       Pre Treatment weight: 111.8  Post Treatment weight: 109.8  Estimated Dry Weight: na    Access used:     Central Venous Catheter:          Tunneled or Non-tunneled: tunneled           Site: right chest          Access Flow: good      Internal Access:       AV Fistula or AV Graft: na         Site: na       Access Flow: na       Sign and symptoms of infection: none       If YES: na    Medications or blood products given: na    Chronic outpatient schedule: [facility]    Chronic outpatient unit: [facility]    Summary of response to treatment: pt did well    Explain if orders NOT met, was physician notified:trinh      ACES flowsheet faxed to patient unit/ placed in patient chart: yes    Post assessment completed: yes    Report given to: Rn      * Intra-treatment documented Safety Checks include the followin) Access and face visible at all times. 2) All connections and blood lines are secure with no kinks. 3) NVL alarm engaged. 4) Hemosafe device applied (if applicable). 5) No collapse of Arterial or Venous blood chambers. 6) All blood lines / pump segments in the air detectors.

## 2021-11-18 NOTE — PATIENT CARE CONFERENCE
[facility]: [facility]   ACUTE REHABILITATION  TEAM CONFERENCE NOTE  Date: 21  Patient Name: Piotr Wheatley       Room: 2568/0800-92  MRN: [id number]       : 1960  (59 y.o.)     Gender: female   Referring Practitioner: Dr. Ontiveros Marking   Stroke-like symptoms [R29.90]  Diagnosis: Stroke-Like Symptoms     NURSING  Bladder  Always Continent  Bowel   Always Continent  Date of Last BM:   Intervention    Both Bowel & Bladder Program     Wounds/Incisions/Ulcers: Redness sacrum/buttocks. Black wound on L great toe. Medication Education Program: Patient able to manage medications and being educated by nursing  Pain: Patient's pain is currently controlled with -   prn Tylenol. Fall Risk:  Falling star program initiated    PHYSICAL THERAPY  Bed mobility  Bridging: Stand by assistance  Rolling to Left: Stand by assistance  Rolling to Right: Stand by assistance  Supine to Sit: Stand by assistance  Sit to Supine: Stand by assistance (Assist level reported by Speech Therapist)  Scooting: Minimal assistance (Bracing R foot to scoot HOB; Pt using rails, bridging)  Comment: Pt performs bed mobility with HOB slightly elevated and minimal use of bed rails. pt c/o mild dizziness upon initially sitting upright;      Transfers:  Sit to Stand: Stand by assistance  Stand to sit: Stand by assistance  Bed to Chair: Stand by assistance    Ambulation 1  Surface: level tile  Device: Rolling Walker  Assistance: Contact guard assistance (Progressing to SBA)  Quality of Gait: Slow, steady pace. Minimal R foot clearance of floor but demonstrating fair heel strike. Gait Deviations: Slow Annie; Increased ELISABET; Decreased step length; Decreased step height  Distance: 20' & 7' AM; 160' PM   Comments: Pt determining distance, denied need to turn earlier despite 2 cues, states on way back \"I should have turned sooner\"; Required one standing rest break, briefly resting forearms on walker.    Ambulation 2  Surface - 2: level tile  Device 2: Rolling Walker  Other Apparatus 2: Wheelchair follow (both CGA and WC follow provided by a single PTA)  Assistance 2: Contact guard assistance  Quality of Gait 2: cautious  Gait Deviations: Slow Annie; Decreased step length; Decreased step height  Distance: 65 ft  Comments: pt amb on linoleum to carpet transition needing inc time to perform as patient is extra cautious with this task. # Steps : 5  Stairs Height: 4\"  Rails: Bilateral (parallel bars)  Curbs: 6\"  Device:  (// bars)  Assistance: Contact guard assistance (Progressing to SBA)  Comment: Pt demonstrates Good return to review of sequencing before attempt; slightly SOB. Equipment Needed:  Pt has a rollator     Pt with recent left frontal stroke with residual mild right-sided weakness, pt is a Hemodialysis pt. Presents with impaired cognition, increased Right side weakness and sensation deficits. Pt requiring SBA for bed mobility, and CGA for transfers and short distance gait 30-40' on both level tile and carpeting. pt is motivated and has good home support. pt would benefit from continued PT services to address deficits of strength, coordination, balance, posture, and overall functional mobility to allow for a safe return home.     Goals  Time Frame for Short term goals: 5 days  Short term goal 1: pt to demo all bed mobility with supervision to decrease burden of care  Short term goal 2: pt to demo all transfers with SBA using RW  Short term goal 3: pt to amb 48' with 2 turns using RW and SBA  Short term goal 4: pt to negotiate at least 1 step with UE support and SBA to allow for safe access into home [address]   Oral Hygiene            Contact guard assistance (Assist for hair wash shower cap donning, otherwise occasional cuing)   Shower/Bathe Self             UE Bathing: Setup  LE Bathing: Minimal assistance (Nursing performed stalin-care after toileting before OT session)   Upper Body Dressing            Setup   Lower Body Dressing            Putting On/Taking Off Footwear             Setup   Toilet Transfer             Minimal Assist (per patient report)     [address] [facility]            Minimal assistance (per report)    Bed mobility  Rolling to Left: Supervision  Supine to Sit: Supervision (elevated Head of bed)  Scooting: Supervision  Comment: Pt performs bed mobility with HOB slightly elevated and minimal use of bed rails. Balance  Sitting Balance: Modified independent   Standing Balance: Contact guard assistance     Short term goals  Time Frame for Short term goals: By 4-5 days  Short term goal 1: Pt will complete lower body dressing/bathing with CGA and good safety with use of AE as needed  Short term goal 2: Pt will complete upper body dressing with set-up assistance and good safety/attention to task  Short term goal 3: Pt will complete functional transfers/mobility during self care tasks with SBA and good safety with use of least restrictive device  Short term goal 4: Pt will tolerate standing 5+ minutes during functional activity of choice with good safety  Short term goal 5: Pt will participate in 30+ minutes of therapeutic exercises/functional activities to increase safety and independence with self care and mobility    SPEECH THERAPY  Min A memory, supervision level problem solving  Short Term Goal: supervision memory, mod I problem solving    NUTRITION  Weight: 242 lb 1 oz (109.8 kg) / Body mass index is 40.28 kg/m². Diet Rx: 4 Carbohydrate Choices per meal, 3-4 gm Na. PO intake appears good. Internal Medicine has been adjusting insulin. Pt needs reminders regarding diet guidelines. Ref. Range 11/17/2021 05:52 11/17/2021 12:07 11/17/2021 17:38 11/17/2021 20:44 11/18/2021 06:07   POC Glucose Latest Ref Range: 65 - 105 mg/dL 148 (H) 278 (H) 201 (H) 205 (H) 285 (H)   Please see nutrition note for details.     SOCIAL WORK ASSESSMENT  Assessment: Pt is from home and lives with her mother. Pt plans to return home with [facility] upon discharge. Pt has supportive sisters who assist with some needs. Pre-Admission Status:  Lives With: Family (Mother)  Type of Home:  (Condo)  Home Layout: One level  Home Access: Ramped entrance  Bathroom Shower/Tub: Walk-in shower, Doors (Threshold to step over)  Bathroom Toilet: Handicap height  Bathroom Equipment: Grab bars in shower, Built-in shower seat (Uses wall of shower to get off toilet)  Bathroom Accessibility: Walker accessible  Home Equipment: 4 wheeled walker, Cane, [employer] aid (Has a recliner at home)  ADL Assistance: Independent  Homemaking Assistance: Needs assistance (sisters assist )  Ambulation Assistance: Independent (7ZR )  Transfer Assistance: Independent  Active : No  Patient's  Info: family-sister  Mode of Transportation: Car  IADL Comments: Pt sleeps in flat bed at home. Additional Comments: Pt recently discharged from Rehab at [facility]  on 8/26/21 and again on 9-26-21. Pt was going to dialysis 3 times per week. Pt is demonstrating some confusion/memory deficits and above information taken from previous therapy eval 11/11/21. Pt able to identify that her mother is still living with her and her sisters assist with caring for her mother.       Family Education: Need to make contact with family to initiate education    Percentage Risk for Readmission: High 28% - 100%   Risk of Unplanned Readmission:  38 %    Critical Items: None       Problem / Barrier Intervention / Plan  Results   Impaired recall Memory retraining     Impaired mobility related to weakness, balance and coordination deficts Strengthening, balance and coordination ex's, fucntional mobility training    Altered ADL status  Training in modified care and organizational strategies to support safe performance of Basic ADLs                           Total Self Care Score    Total Mobility Score  Admission Score:  24      Admission Score:  42  Goal:  42/42         Goal:  74/90   `  Discharge Plan   Estimated Discharge Date: 11/23/21  Home evaluation needed? Home Evaluation Indication (NO, Requires ReEval, YES/Date): No home evaluation need indicated for patient at this time  Overnight or Day Pass: No  Factors facilitating achievement of predicted outcomes: Family support, Motivated, Cooperative and Pleasant  Barriers to the achievement of predicted outcomes: Pain, Cognitive deficit, Decreased endurance, Upper extremity weakness, Lower extremity weakness, Skin Care and Medication managment    Functional Goals at discharge:  Predicted Outcome: Home with familyPATIENT'S LEVEL OF ASSISTANCE: Modified independence and Close Supervision  Discharge therapy goals:  PT: Long term goals  Time Frame for Long term goals : Until D/C  Long term goal 1: pt to demo all bed mobility independently to decrease burden of care  Long term goal 2: pt to demo all transfers with Mod I with RW  Long term goal 3: pt to amb 125' with RW on even surfaces and at least 10' uneven ramped surfaces with RW demonstrating Mod I   Long term goal 4: pt to demo improved RLE strength by 1/2 manual muscle grade to improve safety with functional mobility   Long term goal 5: pt to improve dynamic standing balance to fair + to improve safety with functional mobility   Long term goal 6: pt to improve PASS score to 30/36 to demo improved balance and safety with mobility   OT:Long term goals  Time Frame for Long term goals : By discharge  Long term goal 1: Pt will complete BADLs with modified independence and good safety with use of AE as needed.    Long term goal 2: Pt will complete functional transfers/mobility during self care tasks with modified independence and good safety with use of least restrictive device  Long term goal 3: Pt will tolerate standing 10+ minutes during functional activity of choice with good safety  Long term goal 4: Pt will verbalize/demonstrate good understanding of home safety/fall prevention strategies to increase safety and independence with self care and mobiliyt  Long term goal 5: Pt will complete simple meal prep/light house keeping task with supervision and good safety   ST: mod I    Team Members Present at Conference:  /:  Antione Davis [place]  Occupational Therapist: Kelsey Wilhelm OT   Physical Therapist: Sukhi Farfan PT  Speech Therapist: Eric WEISSCCC/SLP  Nurse: Willis Elder RN   Dietary/Nutrition: Alia Grace RD, LD  Pastoral Care: Sanjana Emery  CMG: Casimiro Tyson RN    I approve the established interdisciplinary plan of care as documented within the medical record of Lisa Huber.     Shin Pearson MD

## 2021-11-18 NOTE — PLAN OF CARE
Problem: Skin Integrity:  Goal: Will show no infection signs and symptoms  Description: Will show no infection signs and symptoms  11/18/2021 0419 by Asya Garzon RN  Outcome: Ongoing     Problem: Skin Integrity:  Goal: Absence of new skin breakdown  Description: Absence of new skin breakdown  11/18/2021 0419 by Asya Garzon RN  Outcome: Ongoing     Problem: Falls - Risk of:  Goal: Will remain free from falls  Description: Will remain free from falls  11/18/2021 0419 by Asya Garzon RN  Outcome: Ongoing     Problem: Falls - Risk of:  Goal: Absence of physical injury  Description: Absence of physical injury  11/18/2021 0419 by Asya Garzon RN  Outcome: Ongoing     Problem: Neurological  Goal: Maximum potential motor/sensory/cognitive function  11/18/2021 0419 by Asya Garzon RN  Outcome: Ongoing     Problem: Pain:  Goal: Pain level will decrease  Description: Pain level will decrease  Outcome: Ongoing     Problem: Pain:  Goal: Control of acute pain  Description: Control of acute pain  Outcome: Ongoing     Problem: Pain:  Goal: Control of chronic pain  Description: Control of chronic pain  Outcome: Ongoing

## 2021-11-18 NOTE — PLAN OF CARE
Problem: Skin Integrity:  Goal: Will show no infection signs and symptoms  Description: Will show no infection signs and symptoms  11/18/2021 1504 by Luis Recio RN  Outcome: Ongoing     Problem: Falls - Risk of:  Goal: Will remain free from falls  Description: Will remain free from falls  11/18/2021 1504 by Luis Recio RN  Outcome: Ongoing     Problem: Pain:  Goal: Pain level will decrease  Description: Pain level will decrease  11/18/2021 1504 by Luis Recio RN  Outcome: Ongoing

## 2021-11-18 NOTE — CONSULTS
[facility]    CONSULTATION / HISTORY AND PHYSICAL EXAMINATION            Date:   11/18/2021  Patient name:  Sunny Rivera  Date of admission:  11/15/2021  7:37 PM  MRN:   [id number]  Account:  [de-identified]  YOB: 1960  PCP:    AFSANEH Hart CNP  Room:   2050/2050-01  Code Status:    Full Code    Physician Requesting Consult: Kendra Boland MD    Reason for Consult:  medical management    Chief Complaint:     No chief complaint on file. History Obtained From:     Patient medical record nursing staff    History of Present Illness:   59 F with hx of CVA, acute kidney injury on chronic renal disease requiring hemodialysis, has a tunneled catheter at this time possibly will get AV fistula soon, diastolic CHF, hypertension, type 2 diabetes, uncontrolled sugars, on insulin, admitted to [facility] acute rehab for physical and Occupational Therapy. This is her third admission in the rehab, was admitted with altered mental status and right-sided weakness at the dialysis, which mostly resolved with some residual right-sided weakness. She had a previous 2 ischemic strokes. Seen by neurology, had MRI done on this admission as well which did not show any new acute stroke and EEG was normal, psychiatry was consulted as well and advised to increase BuSpar.   We are consulted for medical management      Past Medical History:     Past Medical History:   Diagnosis Date    Backache, unspecified     CHF (congestive heart failure) (HCC)     Chronic kidney disease     Coronary atherosclerosis of artery bypass graft     Cramp of limb     Gallstones     Hypertension     Insomnia     Pneumonia     Type II or unspecified type diabetes mellitus with renal manifestations, not stated as uncontrolled(250.40)     Type II or unspecified type diabetes mellitus without mention of complication, not stated as uncontrolled     Unspecified vitamin D deficiency         Past Surgical History:     Past Surgical History:   Procedure Laterality Date    ANKLE FRACTURE SURGERY      BREAST SURGERY      CARPAL TUNNEL RELEASE      CHOLECYSTECTOMY, OPEN N/A     CORONARY ARTERY BYPASS GRAFT      x3    HAND SURGERY      IR TUNNELED CATHETER PLACEMENT GREATER THAN 5 YEARS  8/18/2021    IR TUNNELED CATHETER PLACEMENT GREATER THAN 5 YEARS 8/18/2021 [facility]    KNEE ARTHROSCOPY      right    TONSILLECTOMY          Medications Prior to Admission:     Prior to Admission medications    Medication Sig Start Date End Date Taking? Authorizing Provider   atorvastatin (LIPITOR) 80 MG tablet Take 1 tablet by mouth daily 11/3/21  Yes AFSANEH Mcmahon CNP   aspirin 81 MG chewable tablet Take 1 tablet by mouth daily 9/25/21  Yes Santa Atkins MD   busPIRone (BUSPAR) 7.5 MG tablet Take 1 tablet by mouth 3 times daily 9/25/21  Yes Santa Atkins MD   ELIQUIS 5 MG TABS tablet Take 1 tablet by mouth 2 times daily 9/25/21  Yes Santa Atkins MD   pantoprazole (PROTONIX) 40 MG tablet Take 1 tablet by mouth every morning (before breakfast) 9/25/21  Yes Santa Atkins MD   acetaminophen (TYLENOL) 325 MG tablet Take 2 tablets by mouth every 4 hours as needed for Pain 8/25/21  Yes Santa Atkins MD   tamsulosin [facility]) 0.4 MG capsule Take 1 capsule by mouth daily 11/10/21   AFSANEH Mcmahon CNP   ranolazine (RANEXA) 1000 MG extended release tablet Take 1 tablet by mouth 2 times daily 9/25/21   Santa Atkins MD   gabapentin (NEURONTIN) 300 MG capsule Take 1 capsule by mouth 2 times daily for 30 days.  9/25/21 11/10/21  Santa Atkins MD   traZODone (DESYREL) 50 MG tablet Take 1.5 tablets by mouth nightly 9/25/21   Santa Atkins MD   insulin glargine [facility]) 100 UNIT/ML injection pen Inject 53 Units into the skin 2 times daily 9/25/21   Santa Atkins MD   carvedilol (COREG) 6.25 MG tablet Take 1 tablet by mouth 2 times daily 9/25/21 Kyle Churchill MD   furosemide (LASIX) 80 MG tablet Take 1 tablet by mouth 2 times daily 9/25/21   Kyle Churchill MD   febuxostat (ULORIC) 40 MG TABS tablet Take 1 tablet by mouth daily 9/25/21   Kyle Churchill MD   Insulin Pen Needle (BD ULTRA-FINE PEN NEEDLES) 29G X 12.7MM MISC Use one needle for each insulin injection. 9/25/21   Kyle Churchill MD   insulin lispro, 1 Unit Dial, (HUMALOG KWIKPEN) 100 UNIT/ML SOPN Per sliding scale:  If <139  No Insulin; 140-199  2 Units; 200-249  4 Units; 250-299  6 Units; 300-349  8 Units; 350-400  10 Units; Above 400  12 Units 9/25/21   Kyle Churchill MD   blood glucose test strips (DEN CONTOUR TEST) strip 1 each by In Vitro route 3 times daily 8/25/21   Kyle Churchill MD   ferrous sulfate (BRIAN-ARCHIE) 325 (65 Fe) MG tablet Take 1 tablet by mouth 2 times daily (with meals) 8/25/21   Kyle Churchill MD   senna (SENOKOT) 8.6 MG tablet Take 2 tablets by mouth daily as needed (Constipation) 8/25/21   Kyle Churchill MD   Alcohol Swabs 70 % PADS Use an alcohol swab to cleanse the skin before checking your blood sugar and before each insulin injection. 8/25/21   Kyle Churchill MD   sharps container 1 each by Does not apply route as needed (dirty pen needles) 4/19/21   AFSANEH Foley CNP   allopurinol (ZYLOPRIM) 100 MG tablet Take 1 tablet by mouth daily 4/14/21   AFSANEH Foley CNP        Allergies:     Adhesive tape, Ace inhibitors, Iv dye [iodides], and Metformin and related    Social History:     Tobacco:    reports that she has never smoked. She has never used smokeless tobacco.  Alcohol:      reports no history of alcohol use. Drug Use:  reports no history of drug use. Family History:     Family History   Problem Relation Age of Onset    Diabetes Father     Heart Failure Father        Review of Systems:     Positive and Negative as described in HPI.     CONSTITUTIONAL:  negative for fevers, chills, sweats, fatigue, weight loss  HEENT:  negative for vision, hearing changes, runny nose, throat pain  RESPIRATORY:  negative for shortness of breath, cough, congestion, wheezing. CARDIOVASCULAR:  negative for chest pain, palpitations. GASTROINTESTINAL:  negative for nausea, vomiting, diarrhea, constipation, change in bowel habits, abdominal pain   GENITOURINARY:  negative for difficulty of urination, burning with urination, frequency   INTEGUMENT:  negative for rash, skin lesions, easy bruising   HEMATOLOGIC/LYMPHATIC:  negative for swelling/edema   ALLERGIC/IMMUNOLOGIC:  negative for urticaria , itching  ENDOCRINE:  negative increase in drinking, increase in urination, hot or cold intolerance  MUSCULOSKELETAL:  negative joint pains, muscle aches, swelling of joints  NEUROLOGICAL: Positive for dizziness, sensory change and weakness. Negative for headaches. Psychiatric/Behavioral: Positive for memory loss. BEHAVIOR/PSYCH:      Physical Exam:     BP (!) 130/56   Pulse 68   Temp 98 °F (36.7 °C) (Oral)   Resp 16   Ht 5' 5\" (1.651 m)   Wt 242 lb 1 oz (109.8 kg)   SpO2 95%   BMI 40.28 kg/m²   Temp (24hrs), Av.1 °F (36.7 °C), Min:98 °F (36.7 °C), Max:98.2 °F (36.8 °C)    Recent Labs     21  1738 21  2044 21  0607 21  1207   POCGLU 201* 205* 285* 213*       Intake/Output Summary (Last 24 hours) at 2021 1536  Last data filed at 2021 1859  Gross per 24 hour   Intake 500 ml   Output 2500 ml   Net -2000 ml       General Appearance:  alert, well appearing, and in no acute distress  Mental status: oriented to person, place, and time with normal affect  Head:  normocephalic, atraumatic.   Eye: no icterus, redness, pupils equal and reactive, extraocular eye movements intact, conjunctiva clear  Ear: normal external ear, no discharge, hearing intact  Nose:  no drainage noted  Mouth: mucous membranes moist  Neck: supple, no carotid bruits, thyroid not palpable  Lungs: Bilateral equal air entry, clear to ausculation, no wheezing, rales or rhonchi, normal effort  Cardiovascular: normal rate, regular rhythm, no murmur, gallop, rub. Abdomen: Soft, nontender, nondistended, normal bowel sounds, no hepatomegaly or splenomegaly  Neurologic: There are no new focal motor or sensory deficits, normal muscle tone and bulk, no abnormal sensation, normal speech, cranial nerves II through XII grossly intact, mild weakness on right LE  Skin: No gross lesions, rashes, bruising or bleeding on exposed skin area  Extremities:  peripheral pulses palpable, no pedal edema or calf pain with palpation  Psych:      Investigations:      Laboratory Testing:  Recent Results (from the past 24 hour(s))   POC Glucose Fingerstick    Collection Time: 11/17/21  5:38 PM   Result Value Ref Range    POC Glucose 201 (H) 65 - 105 mg/dL   POC Glucose Fingerstick    Collection Time: 11/17/21  8:44 PM   Result Value Ref Range    POC Glucose 205 (H) 65 - 105 mg/dL   POC Glucose Fingerstick    Collection Time: 11/18/21  6:07 AM   Result Value Ref Range    POC Glucose 285 (H) 65 - 105 mg/dL   POC Glucose Fingerstick    Collection Time: 11/18/21 12:07 PM   Result Value Ref Range    POC Glucose 213 (H) 65 - 105 mg/dL           Consultations:   IP CONSULT TO DIETITIAN  IP CONSULT TO SOCIAL WORK  IP CONSULT TO NEPHROLOGY  IP CONSULT TO INTERNAL MEDICINE  IP CONSULT TO INTERNAL MEDICINE  IP CONSULT TO PODIATRY  Assessment :      Primary Problem  Stroke-like symptoms    Active Hospital Problems    Diagnosis Date Noted    Debility [R53.81] 09/19/2021    Morbid obesity with BMI of 40.0-44.9, adult ([facility] [E66.01, Z68.41] 09/14/2021    Stroke-like symptoms [R29.90] 09/13/2021    Essential hypertension [I10] 05/03/2021    Anemia in stage 4 chronic kidney disease ([facility] [N18.4, D63.1] 05/03/2021    Iron deficiency anemia [D50.9] 08/31/2020    Diabetic polyneuropathy associated with type 2 diabetes mellitus [facility] [E11.42] 02/17/2020    Stage 4 chronic kidney disease ([facility] [N18.4] 08/06/2019  Chronic diastolic heart failure (HCC) [I50.32] 09/20/2018    Type 2 diabetes mellitus with kidney complication, with long-term current use of insulin ([facility] [E11.29, Z79.4] 09/20/2018    Mixed hyperlipidemia [E78.2] 07/27/2016    Diabetes mellitus due to underlying condition with diabetic nephropathy, with long-term current use of insulin (HCC) [E08.21, Z79.4] 06/29/2015    Acute renal failure ([facility] [N17.9] 05/18/2015    Atherosclerosis of coronary artery bypass graft of native heart without angina pectoris [I25.810] 05/04/2015       Plan:     DM2, uncontrolled , , added premeal insulin 10 Units, Carb control diet   HTN, well controlled   HLD, home dose statins   Debility/CVA/ no new strokes on last MRI, PT/OT , deconditioning   Morbid obesity   DVt PPX   Left great toe area necrosis vs melanoma  Podiatry surg consult  uncontolled dm increase lantus to 73 bid  Podiatry input        Sophia Hector MD  11/18/2021  3:36 PM    Copy sent to Dr. Roberto Crain, APRN - CNP    Please note that this chart was generated using voice recognition Dragon dictation software. Although every effort was made to ensure the accuracy of this automated transcription, some errors in transcription may have occurred.

## 2021-11-18 NOTE — PROGRESS NOTES
[address] [facility]    ACUTE REHABILITATION OCCUPATIONAL THERAPY  DAILY NOTE    Date: 21  Patient Name: Sunny Rivera      Room: 6050/6387-14    MRN: [id number]   : 1960  (59 y.o.)  Gender: female   Referring Practitioner: Kendra Boland MD  Diagnosis: Stroke-Like Symptoms       Restrictions  Restrictions/Precautions: Contact Precautions, Fall Risk  Implants present? :  (pt denies)  Other position/activity restrictions: Hemodialysis MWF  Required Braces or Orthoses  Right Lower Extremity Brace:  (lymphedema wraps; not present today)  Left Lower Extremity Brace:  (lymphedema wraps; not present today)  Required Braces or Orthoses?: Yes    Subjective  Subjective: \"I just feel so lost.\"  pt describing feelings related to memory deficits- notes LT memories gone. emotional support provided. Comments: Pt pleasant, conversant  and agreeable to occupational therapy     Orientation Level: Oriented X4          Objective  Cognition  Overall Cognitive Status: Exceptions  Arousal/Alertness: Appropriate responses to stimuli  Following Commands: Follows multistep commands consistently  Attention Span: Appears intact  Memory: Decreased long term memory  Safety Judgement: Good awareness of safety precautions  Problem Solving: Assistance required to generate solutions  Insights: Fully aware of deficits  Initiation: Does not require cues  Sequencing: Does not require cues  Cognition Comment: spelling errors 3/12 when writing months from memory. fair memory for events today and yesterday. pt reports no LT memories. good sequencing, initiation and safety for adls.   Balance  Sitting Balance: Independent  Standing Balance: Supervision  Bed mobility  Supine to Sit: Modified independent (from bed in am, indep from mat in pm)  Transfers  Stand Step Transfers: Supervision  Stand Pivot Transfers: Supervision  Sit to stand: Supervision  Stand to sit: Supervision  Standing Balance  Time: 3-5 min x 6 in am, 3-5 min x 4 in pm  Activity: functional transfers/mobility, toileting, lower body bathing/dressing in am, ambulation and transfers and static stand to place menu order in pm  Comment: with RW, reports prior limited stand ilana  Functional Mobility  Functional - Mobility Device: Rolling Walker  Activity: To/from bathroom  Assist Level: Supervision  Toilet Transfers  Toilet - Technique: Ambulating  Equipment Used: Grab bars  Toilet Transfer: Supervision  Toilet Transfers Comments: RW  Fine Motor: fair during adls, pt using modified  to hold pen more securely with RUE, hand writing good legibility- name and months. Type of ROM/Therapeutic Exercise  Type of ROM/Therapeutic Exercise: AROM  Comment: RUE ex, notes pain with full AROM shld, position modified. Exercises  Shoulder Flexion: RUE from sidelying and sitting on mat  Shoulder ABduction: RUE while sidelying on L                       ADL  Feeding: Setup  Grooming: Modified independent  (seated to brush teeth, wash face, wash hair with shower cap, comb hair)  UE Bathing: Setup  LE Bathing: Minimal assistance; Setup  UE Dressing: Setup  LE Dressing: Setup; Minimal assistance  Toileting: Supervision  Additional Comments: pt amb to bathroom, completed groom, bathe and dress, amb to recliner and was assisted to don lymphedema socks, wraps to BLE. (needed full assist on 2nd leg due to time constraints and pt max fatigued.  )          Assessment     Activity Tolerance: Patient Tolerated treatment well; Patient limited by fatigue  Activity Tolerance: am ilana well, pt worked hard in pm despite fatigued, notes late night for dialysis last night- 7 pm.            11/18/21 1420 11/18/21 1425   OT Individual Minutes   Time In [address]   Time Out 0933 1418   Minutes 63 47          Patient Education:  Patient Goals   Patient goals : \"I would like to get my hand and right arm going. My memory back.  Just being able to walk around and get my right leg going\"  Learner:patient  Method: demonstration and explanation       Outcome: acknowledged understanding  and demonstrated understanding    mindfulness and cognitive therapy techniques when feeling \"Lost \" due to LT memory deficits. Plan  Plan  Times per week: 900 minutes combined between OT/PT/SLP   Times per day: Twice a day  Current Treatment Recommendations: Self-Care / ADL, Strengthening, ROM, Balance Training, Functional Mobility Training, Endurance Training, Cognitive Reorientation, Safety Education & Training, Patient/Caregiver Education & Training, Equipment Evaluation, Education, & procurement, Home Management Training, Cognitive/Perceptual Training  Patient Goals   Patient goals : \"I would like to get my hand and right arm going. My memory back. Just being able to walk around and get my right leg going\"  Short term goals  Time Frame for Short term goals: By 4-5 days  Short term goal 1: Pt will complete lower body dressing/bathing with CGA and good safety with use of AE as needed  Short term goal 2: Pt will complete upper body dressing with set-up assistance and good safety/attention to task  Short term goal 3: Pt will complete functional transfers/mobility during self care tasks with SBA and good safety with use of least restrictive device  Short term goal 4: Pt will tolerate standing 5+ minutes during functional activity of choice with good safety  Short term goal 5: Pt will participate in 30+ minutes of therapeutic exercises/functional activities to increase safety and independence with self care and mobility  Long term goals  Time Frame for Long term goals : By discharge  Long term goal 1: Pt will complete BADLs with modified independence and good safety with use of AE as needed.    Long term goal 2: Pt will complete functional transfers/mobility during self care tasks with modified independence and good safety with use of least restrictive device  Long term goal 3: Pt will tolerate standing 10+ minutes during functional activity of choice with good safety  Long term goal 4: Pt will verbalize/demonstrate good understanding of home safety/fall prevention strategies to increase safety and independence with self care and mobiliyt  Long term goal 5: Pt will complete simple meal prep/light house keeping task with supervision and good safety        Electronically signed by Sobeida Fang OT on 11/18/21 at 2:42 PM EST

## 2021-11-18 NOTE — PLAN OF CARE
Individualized Plan of [address] [facility] Inpatient Rehabilitation Unit    Rehabilitation physician: Dr. Mrasha Liu Date: 11/15/2021     Rehabilitation Diagnosis: Stroke-like symptoms [R29.90]     Rehabilitation impairments: self care, mobility, motor dysfunction and cognitive function    Factors facilitating achievement of predicted outcomes: Family support, Motivated, Cooperative and Pleasant  Barriers to the achievement of predicted outcomes: Cognitive deficit, Decreased endurance, Upper extremity weakness, Lower extremity weakness, Skin Care, Wound Care and Medication managment    Patient Goals: Improve independence with mobility, Increase overall strength and endurance, Increase balance, Increase endurance, Increase independence with activities of daily living, Improve cognition, Integrate appropriate pain management plan, Assure adequate nutritional option for discharge and Provide appropriate patient and family education      NURSING:  Nursing goals for  O[place] while on the rehabilitation unit will include:  Adequate number of bowel movements, Urinate with no urinary retention >300ml in bladder, Maintain O2 SATs at an acceptable level during stay, Effective pain management while on the rehabilitation unit, Establish adequate pain control plan for discharge, Absence of skin breakdown while on the rehabilitation unit, Avoidance of any hospital acquired infections, Freedom from injury during hospitalization and Complete education with patient/family with understanding demonstrated regarding disease process and resultant impairment     In order to achieve these goals, nursing interventions may include bowel/bladder training, education for medical assistive devices, medication education, O2 saturation management, energy conservation, stress management techniques, fall prevention, alarms protocol, seating and positioning, skin/wound care, pressure relief instruction, dressing changes, infection protection, DVT prophylaxis, assistance with safe transfers , and/or assistance with bathroom activities and hygiene. PHYSICAL THERAPY:  Goals:        Short term goals  Time Frame for Short term goals: 5 days  Short term goal 1: pt to demo all bed mobility with supervision to decrease burden of care  Short term goal 2: pt to demo all transfers with SBA using RW  Short term goal 3: pt to amb 48' with 2 turns using RW and SBA  Short term goal 4: pt to negotiate at least 1 step with UE support and SBA to allow for safe access into home shower  Long term goals  Time Frame for Long term goals : Until D/C  Long term goal 1: pt to demo all bed mobility independently to decrease burden of care  Long term goal 2: pt to demo all transfers with Mod I with RW  Long term goal 3: pt to amb 125' with RW on even surfaces and at least 10' uneven ramped surfaces with RW demonstrating Mod I   Long term goal 4: pt to demo improved RLE strength by 1/2 manual muscle grade to improve safety with functional mobility   Long term goal 5: pt to improve dynamic standing balance to fair + to improve safety with functional mobility   Long term goal 6: pt to improve PASS score to 30/36 to demo improved balance and safety with mobility     Plan of Care:  Due to impaired functional endurance and/or medical issues, the patient is to be seen for a combined total of at least a  900 minutes over 7 days of Physical, Occupational and/or Speech Therapy. Anticipated interventions may include therapeutic exercises, gait training, neuromuscular re-ed, transfer training, community reintegration, bed mobility, w/c mobility and training.       OCCUPATIONAL THERAPY:  Goals:             Short term goals  Time Frame for Short term goals: By 4-5 days  Short term goal 1: Pt will complete lower body dressing/bathing with CGA and good safety with use of AE as needed  Short term goal 2: Pt will complete upper body dressing with set-up assistance and good safety/attention to task  Short term goal 3: Pt will complete functional transfers/mobility during self care tasks with SBA and good safety with use of least restrictive device  Short term goal 4: Pt will tolerate standing 5+ minutes during functional activity of choice with good safety  Short term goal 5: Pt will participate in 30+ minutes of therapeutic exercises/functional activities to increase safety and independence with self care and mobility  Long term goals  Time Frame for Long term goals : By discharge  Long term goal 1: Pt will complete BADLs with modified independence and good safety with use of AE as needed. Long term goal 2: Pt will complete functional transfers/mobility during self care tasks with modified independence and good safety with use of least restrictive device  Long term goal 3: Pt will tolerate standing 10+ minutes during functional activity of choice with good safety  Long term goal 4: Pt will verbalize/demonstrate good understanding of home safety/fall prevention strategies to increase safety and independence with self care and mobiliyt  Long term goal 5: Pt will complete simple meal prep/light house keeping task with supervision and good safety     Plan of Care:  Due to impaired functional endurance and/or medical issues, the patient is to be seen for a combined total of at least a  900 minutes over 7 days of Physical, Occupational and/or Speech Therapy. Anticipated interventions may include ADL and IADL retraining, strengthening, safety education and training, patient/caregiver education and training, equipment evaluation/ training/procurement, neuromuscular reeducation, wheelchair mobility training.       SPEECH THERAPY:   Goals:             Short-term Goals  Goal 1: Pt answer biographical/orientation questions 90% accuracy  Goal 2: Pt will complete divergent/generative naming tasks 90% accuracy  Goal 3: Pt will complete abstract verbal reasoning (convergent/divergent and inferencing) tasks with 90% accuracy  Goal 4: Pt will recall 3-5 units with delay 90% accuracy  Goal 5: Increase pt ed re use of compensatory memory strategies with returned demo at 100%  Goal 6: Pt will complete functional writing tasks with 90% accuracy        Plan of Care:  Due to impaired functional endurance and/or medical issues, the patient is to be seen for a combined total of at least a  900 minutes over 7 days of Physical, Occupational and/or Speech Therapy. Anticipated interventions may include speech/language/communication therapy, cognitive training, group therapy, education, and/or dysphagia therapy based on the above goals. CASE MANAGEMENT:  Goals:   Assist patient/family with discharge planning, patient/family counseling,  and coordination with insurance during the inpatient rehabilitation stay. Other members of the multidisciplinary rehabilitation team that will be involved in the patient's plan of care include dietary, respiratory therapy. Medical issues being managed closely and that require 24 hour availability of a physician:  Bowel/Bladder function, Wound care, Pain management, Infection protection, DVT prophylaxis, Fall precautions, Fluid/Electrolyte balance, Nutritional status, Respiratory needs, Anemia and History of heart disease                                           Physician anticipated functional outcomes: Improved independence with functional measures   Estimated length of stay for this admission:  1-2 weeks  Medical Prognosis: Fair  Anticipated disposition: Home. The potential to achieve the above medical and rehabilitative goals is good. This plan of care has been developed with the assistance and input of the multidisciplinary rehabilitation team.  The plan was reviewed with the patient on 11/17/2021.   The patient has had the opportunity to provide input to the therapy team.    I have reviewed this Individualized Plan of Care and agree with its contents. Above documentation has been expanded, modified, adjusted to reflect the findings of my evaluations and goals for the patient.     Physician:  Electronically signed by Anuj Mead MD on 11/17/21 at 10:52 PM EST

## 2021-11-18 NOTE — PROGRESS NOTES
Physical Therapy  [address]  Acute Rehabilitation Physical Therapy Progress Note    Date: 21  Patient Name: Celio Martínez       Room: [facility]3313-  MRN: [id number]   Account: [de-identified]   : 1960  (59 y.o.) Gender: female     Referring Practitioner: Lorenzo Somers MD  Diagnosis: Stroke-Like Symptoms  Past Medical History:  has a past medical history of Backache, unspecified, CHF (congestive heart failure) ([address] Chronic kidney disease, Coronary atherosclerosis of artery bypass graft, Cramp of limb, Gallstones, Hypertension, Insomnia, Pneumonia, Type II or unspecified type diabetes mellitus with renal manifestations, not stated as uncontrolled(250.40), Type II or unspecified type diabetes mellitus without mention of complication, not stated as uncontrolled, and Unspecified vitamin D deficiency. Past Surgical History:   has a past surgical history that includes Coronary artery bypass graft; Knee arthroscopy; Carpal tunnel release; Breast surgery; Tonsillectomy; Hand surgery; Ankle fracture surgery; Cholecystectomy, open (N/A); and IR TUNNELED CVC PLACE WO SQ PORT/PUMP > 5 YEARS (2021). Additional Pertinent Hx: This is a 59 y.o. female with a significant past medical history of Chronic atrial fibrillation on Eliquis, Type 2 diabetes mellitus, essential systemic hypertension, coronary artery disease s/p CABG in 2004 and PTCA in 2019, heart failure with preserved ejection fraction LVEF 55% and end-stage renal disease secondary to diabetic and hypertensive nephropathy on routine hemodialysis on a Monday/Wednesday/Friday schedule at [address] dialysis unit on [facility] under the care of Dr. Galeano Courser since 2001 using IJ tunneled dialysis catheter, who presented to the hospital for further evaluation of acute on chronic right-sided weakness, Slurred speech and confusion.   She was initially diagnosed with acute/subacute stroke in the left frontal lobe with right-sided weakness 4 months ago. Neurology consulted , MRI brain pending. Overall Orientation Status: Impaired  Orientation Level: Oriented to place, Oriented to situation, Oriented to person, Disoriented to time  Restrictions/Precautions  Restrictions/Precautions: Contact Precautions; Fall Risk  Required Braces or Orthoses?: Yes  Implants present? :  (pt denies)  Required Braces or Orthoses  Right Lower Extremity Brace:  (lymphedema wraps; not present today)  Left Lower Extremity Brace:  (lymphedema wraps; not present today)  Position Activity Restriction  Other position/activity restrictions: Hemodialysis MWF    Subjective: Pt states she's slept well and appetite is good, looking forward to returning home. Pt reports the pain in her hands has improved however \"still feels stiff\". Pt reports she has an appointment to get her dialysis  fistula on tuesday 11/23/21 at 1230PM. Pt is hopeful to D/C early tuesday to make her appointment. Comments: Pt asked Dr. Ezra Huang to assess sore spot on plantar surface of L hall[place]; he states he will order a consult. Vital Signs  Patient Currently in Pain: Denies        Oxygen Therapy  O2 Device: None (Room air)  Patient Observation  Observations: calm and cooperative       Bed Mobility:   Bed Mobility  Bridging: Contact guard assistance (Writer to assist holding RLE in place. )  Rolling: Stand by assistance; Rolling Left  Sit to Supine: Supervision  Scooting: Minimal assistance (Bracing R foot to scoot HOB; Pt using rails, bridging)  Comment: PT mat, wedge, 1 pillows. Transfers:  Sit to Stand: Stand by assistance  Stand to sit: Stand by assistance  Bed to Chair: Stand by assistance   Comments: Rolling walker. Pt demonstrates safe techniques. Ambulation 1  Surface: level tile  Device: Rolling Walker  Other Apparatus: Wheelchair follow  Assistance: Contact guard assistance (Progressing to SBA)  Quality of Gait: Slow, steady pace.  Minimal R foot clearance of floor but demonstrating fair heel strike. PT reports increase dizziness during amb and request a sitting rest break. Pt's BP sitting is 157/67 and HR is 64. Pt's BP standing is 132/75 and HR is 66. Pt is agreeable to amb again to tolerance. Gait Deviations: Slow Annie; Increased ELISABET; Decreased step length; Decreased step height  Distance: 16', 70', and 80'x1  Comments: Pt requires a seated rest break between amb. Ambulation 2  Surface - 2: level tile  Device 2: Rolling Walker  Assistance 2: Contact guard assistance  Quality of Gait 2: Same as above except pt did not report any dizziness this PM.   Gait Deviations: Slow Annie; Decreased step length; Decreased step height  Distance: 60'x2     Stairs/Curb  Stairs?: Yes  Stairs  # Steps : 5 (x2)  Stairs Height: 4\" (and 6\" )  Rails: Bilateral  Curbs: 6\"  Device: No Device  Assistance: Contact guard assistance (Progressing to SBA)  Comment: Pt demonstrates Good return to review of sequencing before attempt; slightly SOB. Completed on training stairs this date. BALANCE Posture: Good  Sitting - Static: Good (Edge of bed, no back or UE support)  Sitting - Dynamic: Fair; + (Edge of bed, no back  support, B UE support)  Standing - Static: Fair; + (rolling walker)  Standing - Dynamic: Fair (rolling walker)  Comments: standing balance assessed with RW. Pt participates in Brushing teeth while standing at counter top alternating from single to Bilat UE support on sink. Pt demos picking up object from floor with R hand w/ LUE support on bed rail with CGA for safety. EXERCISES    Other exercises?: Yes  Other exercises 1: Seated bilateral LE exercises, x15, R LE 1#, L LE 2#, lime (minimal+) resistance band.    Other exercises 3: Sit <> stand x6  Other exercises 4: standing dynamic balance (reaching out of ELISABET)   Other exercises 6: Supine BLE, 10+5 reps each, active ROM R LE, 1.5# L LE  Other exercises 7: Left sidelying only, right hip ABD clamshells. Reps: 2x10  Other Activities  Comment: rest breaks PRN. Activity Tolerance: Patient limited by fatigue, Patient Tolerated treatment well (Requires extra time: Slow moving with most activities. )  Activity Tolerance: Pt requires multiple seated rest breaks as well as increased time to complete tasks d/t inc neck pain with mobility and quickly fatigueing RLE  PT Equipment Recommendations  Equipment Needed:  (TBD)  Other Comments  Comments: Treatment cut short d/t transport arriving to take patient to MRI     Patient Education  New Education Provided:    Learner:patient  Method: demonstration and explanation       Outcome: needs reinforcement     Current Treatment Recommendations: Strengthening, Balance Training, Functional Mobility Training, Transfer Training, Endurance Training, Gait Training, Home Exercise Program, Safety Education & Training, Patient/Caregiver Education & Training    Conditions Requiring Skilled Therapeutic Intervention  Body structures, Functions, Activity limitations: Decreased functional mobility ; Decreased strength; Decreased cognition; Decreased endurance; Decreased sensation; Decreased balance; Decreased coordination; Decreased posture; Increased pain; Decreased ROM  Assessment:  Pt with recent left frontal stroke with residual mild right-sided weakness, pt is a Hemodialysis pt. Presents with impaired cognition, increased Right side weakness and sensation deficits. Pt requiring SBA for bed mobility, and CGA for transfers and short distance gait 30-40' on both level tile and carpeting. pt is motivated and has good home support. pt would benefit from continued PT services to address deficits of strength, coordination, balance, posture, and overall functional mobility to allow for a safe return home.   Treatment Diagnosis: impaired mobility 2* CVA  Prognosis: Good  Decision Making: Medium Complexity  History: Hemodialysis pt, Recent CVA, Chronic back/neck pain.  Exam: ROM, MMT, Balance and functional mobility assessments. PASS assessment  Clinical Presentation: pt is pleasant and cooperative throughout.    Barriers to Learning: cognition/memory deficits  REQUIRES PT FOLLOW UP: Yes  Treatment Initiated : Bed mobility, transfers, gait, seated BLE strengthening  Discharge Recommendations: Patient would benefit from continued therapy after discharge    Goals  Short term goals  Time Frame for Short term goals: 5 days  Short term goal 1: pt to demo all bed mobility with supervision to decrease burden of care  Short term goal 2: pt to demo all transfers with SBA using RW  Short term goal 3: pt to amb 48' with 2 turns using RW and SBA  Short term goal 4: pt to negotiate at least 1 step with UE support and SBA to allow for safe access into home shower  Short term goal 5: Improve R LE strengthto 2 to 2+/5 to improve fucntion  Long term goals  Time Frame for Long term goals : Until D/C  Long term goal 1: pt to demo all bed mobility independently to decrease burden of care  Long term goal 2: pt to demo all transfers with Mod I with RW  Long term goal 3: pt to amb 125' with RW on even surfaces and at least 10' uneven ramped surfaces with RW demonstrating Mod I   Long term goal 4: pt to demo improved RLE strength by 1/2 manual muscle grade to improve safety with functional mobility   Long term goal 5: pt to improve dynamic standing balance to fair + to improve safety with functional mobility   Long term goal 6: pt to improve PASS score to 30/36 to demo improved balance and safety with mobility        11/18/21 1105 11/18/21 1300   PT Individual Minutes   Time In [id number]   Time Out 1200 1331   Minutes 55 31       Electronically signed by Patricia Templeton PTA on 11/18/21 at 1:56 PM EST

## 2021-11-18 NOTE — PROGRESS NOTES
Physical Medicine & Rehabilitation  Progress Note      Subjective:      Lyndsay Archer is a 59 y.o. female with recrudescence of right-sided and cognitive stroke symptoms. She reports doing okay today. She notes edema and pain in the bilateral hands early this morning, which is improved at the time of evaluation. She is asking about the black eschar on her left toe - wound care following and podiatry consulted. She notes that fistula placement is scheduled for 11/23/21 at 12:30pm - may need to change discharge plan. She denies any other acute concerns. ROS:  Denies fevers, chills, sweats. No chest pain, palpitations, lightheadedness. Denies coughing, wheezing or shortness of breath. Denies abdominal pain, nausea, diarrhea or constipation. No new areas of joint pain. Denies new areas of numbness or weakness. Denies new anxiety or depression issues. No new skin problems. Rehabilitation:   Progressing in therapies. PT:  Restrictions/Precautions: Contact Precautions, Fall Risk  Implants present? :  (pt denies)  Other position/activity restrictions: Hemodialysis MWF  Required Braces or Orthoses  Right Lower Extremity Brace:  (lymphedema wraps; not present today)  Left Lower Extremity Brace:  (lymphedema wraps; not present today)   Transfers  Sit to Stand: Stand by assistance  Stand to sit: Stand by assistance  Bed to Chair: Stand by assistance  Stand Pivot Transfers: Stand by assistance  Comment: Rolling walker. Pt demonstrates safe techniques. Ambulation 1  Surface: level tile  Device: Rolling Walker  Other Apparatus: Wheelchair follow  Assistance: Contact guard assistance (Progressing to SBA)  Quality of Gait: Slow, steady pace. Minimal R foot clearance of floor but demonstrating fair heel strike. PT reports increase dizziness during amb and request a sitting rest break. Pt's BP sitting is 157/67 and HR is 64. Pt's BP standing is 132/75 and HR is 66.  Pt is agreeable to amb again to tolerance. Gait Deviations: Slow Annie, Increased ELISABET, Decreased step length, Decreased step height  Distance: 16', 70', and 80'x1  Comments: Pt requires a seated rest break between amb. Transfers  Sit to Stand: Stand by assistance  Stand to sit: Stand by assistance  Bed to Chair: Stand by assistance  Stand Pivot Transfers: Stand by assistance  Comment: Rolling walker. Pt demonstrates safe techniques. Ambulation  Ambulation?: Yes  More Ambulation?: Yes  Ambulation 1  Surface: level tile  Device: Rolling Walker  Other Apparatus: Wheelchair follow  Assistance: Contact guard assistance (Progressing to SBA)  Quality of Gait: Slow, steady pace. Minimal R foot clearance of floor but demonstrating fair heel strike. PT reports increase dizziness during amb and request a sitting rest break. Pt's BP sitting is 157/67 and HR is 64. Pt's BP standing is 132/75 and HR is 66. Pt is agreeable to amb again to tolerance. Gait Deviations: Slow Annie, Increased ELISABET, Decreased step length, Decreased step height  Distance: 16', 70', and 80'x1  Comments: Pt requires a seated rest break between amb. Surface: level tile  Ambulation 1  Surface: level tile  Device: Rolling Walker  Other Apparatus: Wheelchair follow  Assistance: Contact guard assistance (Progressing to SBA)  Quality of Gait: Slow, steady pace. Minimal R foot clearance of floor but demonstrating fair heel strike. PT reports increase dizziness during amb and request a sitting rest break. Pt's BP sitting is 157/67 and HR is 64. Pt's BP standing is 132/75 and HR is 66. Pt is agreeable to amb again to tolerance. Gait Deviations: Slow Annie, Increased ELISABET, Decreased step length, Decreased step height  Distance: 16', 70', and 80'x1  Comments: Pt requires a seated rest break between amb.      OT:  ADL  Feeding: Setup  Grooming: Modified independent  (seated to brush teeth, wash face, wash hair with shower cap, comb hair)  UE Bathing: Setup  LE Bathing: Minimal assistance, Setup  UE Dressing: Setup  LE Dressing: Setup, Minimal assistance  Toileting: Supervision  Additional Comments: pt amb to bathroom, completed groom, bathe and dress, amb to recliner and was assisted to don lymphedema socks, wraps to BLE. (needed full assist on 2nd leg due to time constraints and pt max fatigued.  )         Balance  Sitting Balance: Independent  Standing Balance: Supervision   Standing Balance  Time: 3-5 min x 6 in am, 3-5 min x 4 in pm  Activity: functional transfers/mobility, toileting, lower body bathing/dressing in am, ambulation and transfers and static stand to place menu order in pm  Comment: with RW, reports prior limited stand ilana  Functional Mobility  Functional - Mobility Device: Rolling Walker  Activity: To/from bathroom  Assist Level: Supervision  Functional Mobility Comments: Verbal cues for hand placement and safety     Bed mobility  Bridging: Contact guard assistance (Writer to assist holding RLE in place. )  Rolling to Left: Supervision  Rolling to Right: Stand by assistance  Supine to Sit: Modified independent (from bed in am, indep from mat in pm)  Sit to Supine: Stand by assistance (Assist level reported by Speech Therapist)  Scooting: Minimal assistance (Bracing R foot to scoot HOB; Pt using rails, bridging)  Comment: Pt performs bed mobility with HOB slightly elevated and minimal use of bed rails. Transfers  Stand Step Transfers: Supervision  Stand Pivot Transfers: Supervision  Sit to stand: Supervision  Stand to sit: Supervision  Transfer Comments: Verbal cues for hand placement and safety   Toilet Transfers  Toilet - Technique: Ambulating  Equipment Used: Grab bars  Toilet Transfer: Supervision  Toilet Transfers Comments: RW     Shower Transfers  Shower - Transfer To:  Shower seat with back  Shower - Technique: Ambulating  Shower Transfers: Minimal assistance  Shower Transfers Comments: OT assisted pt in backing up to shower with RW and utilizing grab bars to assist with back steping over threshold and sitting on shower chair. Pt demonstrated good safety with increased time to complete shower transfer. SPEECH:  Subjective: [x]? Alert     [x]? Cooperative     []? Confused     []? Agitated    []? Lethargic        Objective/Assessment:  Attention: functional      Orientation: oriented place, month, year and president, anthony, latent response time noted.      Recall: Pt. Utilizing memory book as recommended. Pt observed putting question for nurse in memory book to assist in recall.       Organization: see divergent thinking task      Problem Solving/Reasoning:   Divergent thinking:  Categorization grid- Pt completed task with no assist provided, pt required extra time. Spelling errors noted throughout.      Writing: Pt spelling words in isolation (during categorization grid): 63% accuracy anthony, improving with mod cues provided.      Other:   No family present during session.      Current Medications:   Current Facility-Administered Medications: insulin glargine (LANTUS) injection vial 73 Units, 73 Units, SubCUTAneous, BID  busPIRone (BUSPAR) tablet 10 mg, 10 mg, Oral, TID  anticoagulant sodium citrate 4 % injection 1.9 mL, 1.9 mL, IntraCATHeter, PRN **AND** anticoagulant sodium citrate 4 % injection 1.9 mL, 1.9 mL, IntraCATHeter, PRN  insulin lispro (HUMALOG) injection vial 10 Units, 10 Units, SubCUTAneous, TID WC  0.9 % sodium chloride infusion, 25 mL, IntraVENous, PRN  ondansetron (ZOFRAN-ODT) disintegrating tablet 4 mg, 4 mg, Oral, Q8H PRN **OR** [DISCONTINUED] ondansetron (ZOFRAN) injection 4 mg, 4 mg, IntraVENous, Q6H PRN  dextrose 5 % solution, 100 mL/hr, IntraVENous, PRN  dextrose 50 % IV solution, 12.5 g, IntraVENous, PRN  glucagon (rDNA) injection 1 mg, 1 mg, IntraMUSCular, PRN  glucose (GLUTOSE) 40 % oral gel 15 g, 15 g, Oral, PRN  apixaban (ELIQUIS) tablet 5 mg, 5 mg, Oral, BID  aspirin chewable tablet 81 mg, 81 mg, Oral, Daily  atorvastatin (LIPITOR) tablet 80 mg, 80 mg, Oral, Daily  carvedilol (COREG) tablet 6.25 mg, 6.25 mg, Oral, BID  docusate sodium (COLACE) capsule 100 mg, 100 mg, Oral, BID  febuxostat (ULORIC) tablet 40 mg, 40 mg, Oral, Daily  ferrous sulfate (IRON 325) tablet 325 mg, 325 mg, Oral, BID WC  furosemide (LASIX) tablet 80 mg, 80 mg, Oral, BID  gabapentin (NEURONTIN) capsule 300 mg, 300 mg, Oral, BID  insulin lispro (HUMALOG) injection vial 0-18 Units, 0-18 Units, SubCUTAneous, TID WC  insulin lispro (HUMALOG) injection vial 0-9 Units, 0-9 Units, SubCUTAneous, Nightly  lactulose (CHRONULAC) 10 GM/15ML solution 20 g, 20 g, Oral, TID PRN  pantoprazole (PROTONIX) tablet 40 mg, 40 mg, Oral, QAM AC  psyllium (METAMUCIL) 58.12 % packet 1 packet, 1 packet, Oral, Daily  ranolazine (RANEXA) extended release tablet 1,000 mg, 1,000 mg, Oral, BID  sodium chloride flush 0.9 % injection 10 mL, 10 mL, IntraVENous, PRN  tamsulosin (FLOMAX) capsule 0.4 mg, 0.4 mg, Oral, Daily  traZODone (DESYREL) tablet 75 mg, 75 mg, Oral, Nightly  acetaminophen (TYLENOL) tablet 650 mg, 650 mg, Oral, Q4H PRN  senna (SENOKOT) tablet 17.2 mg, 2 tablet, Oral, Daily PRN  bisacodyl (DULCOLAX) suppository 10 mg, 10 mg, Rectal, Daily PRN      Objective:  BP (!) 140/59   Pulse 69   Temp 97.5 °F (36.4 °C) (Oral)   Resp 16   Ht 5' 5\" (1.651 m)   Wt 242 lb 1 oz (109.8 kg)   SpO2 91%   BMI 40.28 kg/m²       GEN: Well developed, well nourished, no acute distress  HEENT: NCAT. EOM grossly intact. Hearing grossly intact. Mucous membranes pink and moist.  RESP: Normal breath sounds with no wheezing, rales, or rhonchi. Respirations WNL and unlabored. CV: Regular rate and rhythm. No murmurs, rubs, or gallops. ABD: Soft, non-distended, BS+ and equal.  NEURO: Alert. Speech fluent. MSK:  Muscle tone and bulk are normal bilaterally. Strength 4+/5 in bilateral upper limbs. Moving bilateral lower limbs with at least antigravity strength.   LIMBS:  Lymphedema wraps in place on bilateral lower limbs.  SKIN: Warm and dry with good turgor. PSYCH: Mood WNL. Affect WNL. Appropriately interactive. Diagnostics:     CBC:   Recent Labs     11/17/21  0920   WBC 6.5   RBC 3.53*   HGB 11.7*   HCT 34.5*   MCV 97.8   RDW 14.9   *     BMP:   Recent Labs     11/17/21  0920      K 4.5      CO2 22   BUN 50*   CREATININE 3.15*   GLUCOSE 285*     BNP: No results for input(s): BNP in the last 72 hours. PT/INR: No results for input(s): PROTIME, INR in the last 72 hours. APTT: No results for input(s): APTT in the last 72 hours. CARDIAC ENZYMES: No results for input(s): CKMB, CKMBINDEX, TROPONINT in the last 72 hours. Invalid input(s): CKTOTAL;3  FASTING LIPID PANEL:  Lab Results   Component Value Date    CHOL 105 04/09/2015    HDL 43 04/09/2015    TRIG 168 04/09/2015     LIVER PROFILE: No results for input(s): AST, ALT, ALB, BILIDIR, BILITOT, ALKPHOS in the last 72 hours. Impression/Plan:   Impaired ADLs, gait, and mobility due to:    1. 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 SLP for cognition. On aspirin, eliquis, and atorvastatin. Follow up with neurology. 2. Anemia:  Hemoglobin 11.7 on 11/17, stable. Monitoring. On iron supplementation. 3. Eschar on left great toe:  Wound care following. Podiatry consulted by IM. 4. AURELIA on CKD: On hemodialysis MWF. Nephrology following. She states that she is scheduled for AV fistula placement on 11/23/21 at 12:30pm (day of discharge). 5. CHF, HTN:  On carvedilol, furosemide  6. Type 2 diabetes with neuropathy:  On lantus BID - IM increased dose on 11/18, humalog TID + sliding scale. Has gabapentin BID  7. Insomnia:  On trazodone nightly  8.  Adjustment disorder with anxiety:  Psychiatry consulted in acute care - recommended titration of buspar to 10mg TID, discussed with patient and increased the dose 11/17; also recommended consideration of an acetylcholinesterase inhibitor. 9. Gout:  On febuxostat  10. Obesity:  BMI 41.02  11. Bowel Management: Docusate BID, metamucil daily, senokot prn, dulcolax prn. 12. DVT Prophylaxis:  On eliquis  13.  Internal Medicine for medical management      Electronically signed by Mikala Rust MD on 11/18/2021 at 11:58 PM

## 2021-11-18 NOTE — CARE COORDINATION
Silva Hussein RN   Registered Nurse   Case Management   Progress Notes      Signed   Date of Service:  11/15/2021 10:39 AM         Related encounter: ED to Hosp-Admission (Discharged) from 11/10/2021 in [address] [place] [address]  Acute Inpatient Rehab Preadmission Assessment     Patient Name: Gilford Rather        MRN:   [id number]    : 1960  (59 y.o.)   Gender: female      Admitted from:   [x]? [facility] [place] [facility]  []? [facility]   []? [facility]    []? [facility]    []? Outside Admission - Location:                                 [x]? Initial         []? Updated     Date of Onset / Admission to the acute hospital: 11/10/21     Inpatient Rehabilitation Admitting Diagnosis:  Stroke like symptoms with functional decline from previous stroke deficit        Did patient have surgery/procedures? [x]? No  []? Yes:       Physicians: Courtney Bowman     Risk for clinical complications: Moderate to High     Co-morbidities:      1. Chronic diastolic heart failure  2. HTN  3. Hyperlipidemia  4. DM II  5. ESRD on HD MWF  6. Anemia of chronic disease  7. Morbid obesity  8. Anxiety  9. Disequilibrium        Financial Information  Primary insurance:  []? Medicare     []? Medicare [employer]      [x]? [employer]    []? Medicaid      []? Medicaid HMO       []? Workers Compensation        []? Personal Pay     Secondary Insurance:  []? Medicare     []? Medicare [employer]      []? [employer]    []? Medicaid      []? Medicaid HMO        []? Workers Compensation      [x]? None     Precautions:   []? Cardiac Precautions:            []? Total hip precautions:           []? Weight Bearing status:  [x]? Safety Precautions/Concerns:  Fall Risk, General Precautions  [x]? Visually impaired: Wears glasses for reading          []? Hard of Hearing:      Isolation Precautions:         [x]? Yes              []?No  If Yes:   []? Droplet  [x]? Contact           []? Airborne     []? VRE     [x]? MRSA        []? C-diff []? TB             []? ESBL         []? MDRO          []? Other:                        Physiatrist:  []? Dr. Timo Mccarty     []? Dr. Bertha Goldstein  [x]? Dr. Estrada Spears  []? Dr. Elmer Araiza     Patients Occupation: Unknown     Reviewed Lab and Diagnostic reports from Current Admission: Yes     Patients Prior Functional  Level: Prior Function  ADL Assistance: Independent  Homemaking Assistance: Needs assistance (sisters assist )  Ambulation Assistance: Independent (3QT )  Transfer Assistance: Independent  Additional Comments: Pt recently discharged from [address] at [facility]  on 8/26/21 and again on 9-26-21. Pt was going to dialysis 3 times per week. Pt is demonstrating some confusion/memory deficits and above information taken from previous therapy eval 8-12-21. Pt able to identify that her mother is still living with her and her sisters assist with caring for her mother.      History of current illness, per PM&R Consult:  Ira Garcia is a 59 y.o. RHD female admitted to [facility] 11/10/2021.       Patient admitted with episode of AMS and weakness R extremities at dialysis. She was having acute memory loss. She is known from 2 previous acute rehab admissions after ischemic CVA.     Nephrology consulted to manage ESRD and hemodialysis on MW.     Neurology consulted. MRI negative for acute changes. Planning EEG. If recurrent amnesia continues they will consider lumbar puncture to rule out encephalitis. She has known 70% R ICA stenosis.     Patient seen in hemodialysis. She notes that her memory is mildly improved since admission. She now recognizes her sister. She remembers me from previous admission. She denies pain but reports numbness from mid forearm extending to all digits in her R hand. She also notes significant weakness in her R leg.  These have worsened from her previous level of function.        Prognosis: Fair     Current functional status for upper extremity ADLs:  UE Bathing: Minimal Dialysis:  MWF  []? Cultural Needs Identified  [x]? Special Equipment Needs: BLE Lymphedema wraps  []? Other     Rehab Justification:  Needs 3 hrs therapy per day or 15 hours per week:  Yes  Identified Rehab Nursing needs: Yes  Intense Interdisciplinary need:  Yes  Need for 24 hr physician supervision:  Yes  Measurable improved quality of life:  Yes  Willingness to participate:  Yes  Medical Necessity:  Yes  Patient able to tolerate care proposed:   Yes     Expected Discharge Destination/Functional Level:  Home with assist  Expected length of time to achieve that level of improvement: 1-2 weeks  Expected Post Discharge Treatments: Home with possible Home Care     Other information relevant to patient's care needs:  N/A     Acute Inpatient Rehabilitation Disclosure Statement will be provided to patient upon admission to ARU with patient's verbalization of understanding.       I have reviewed and concur with the findings and results of the pre-admission screening assessment completed by the Inpatient Rehabilitation Admissions Coordinator.                  Cosigned by: Micheal Mccall MD at 11/15/2021  4:37 PM

## 2021-11-18 NOTE — PROGRESS NOTES
Speech Language Pathology  Speech Language Pathology  [facility] Acute Rehab Unit at [facility]    Cognitive Treatment Note    Date: 11/18/2021  Patients Name: Ian Max  MRN: [id number]  Diagnosis:   Patient Active Problem List   Diagnosis Code    Atherosclerosis of coronary artery bypass graft of native heart without angina pectoris I25.810    Acute renal failure ([facility] [address] N17.9    Diabetes mellitus due to underlying condition with diabetic nephropathy, with long-term current use of insulin ([facility]) E08.21, Z79.4    Chronic diastolic heart failure ([facility]) I50.32    Diabetic polyneuropathy associated with type 2 diabetes mellitus ([facility]) E11.42    History of coronary artery bypass graft Z95.1    Iron deficiency anemia D50.9    Spinal stenosis of lumbar region with neurogenic claudication M48.062    Mixed hyperlipidemia E78.2    Stage 4 chronic kidney disease ([facility]) N18.4    Type 2 diabetes mellitus with kidney complication, with long-term current use of insulin ([facility]) E11.29, Z79.4    Syncope and collapse R55    Obesity, Class II, BMI 35-39.9 E66.9    Thyroid nodule greater than or equal to 1 cm in diameter incidentally noted on imaging study E04.1    Essential hypertension I10    Anemia in stage 4 chronic kidney disease ([facility]) N18.4, D63.1    Chronic ischemic heart disease I25.9    Ischemic stroke of frontal lobe ([facility]) I63.9    Stroke-like symptoms R29.90    Morbid obesity with BMI of 40.0-44.9, adult ([facility]) E66.01, Z68.41    Acute encephalopathy G93.40    Disequilibrium syndrome E87.8    Debility R53.81    Long-term memory loss R41.3    Muscle right arm weakness M62.81    Anxiety F41.9    Chronic midline low back pain with bilateral sciatica M54.41, M54.42, G89.29    Need for immunization against influenza Z23    Altered mental status R41.82       Pain: 0/10    Cognitive Treatment    Treatment time: 0400-3160      Subjective: [x] Alert [x] Cooperative     [] Confused     [] Agitated    [] Lethargic      Objective/Assessment:  Attention: functional     Orientation: oriented place, month, year and president, anthony, latent response time noted. Recall: Pt. Utilizing memory book as recommended. Pt observed putting question for nurse in memory book to assist in recall. Organization: see divergent thinking task     Problem Solving/Reasoning:   Divergent thinking:  Categorization grid- Pt completed task with no assist provided, pt required extra time. Spelling errors noted throughout. Writing: Pt spelling words in isolation (during categorization grid): 63% accuracy anthony, improving with mod cues provided. Other:   No family present during session. Plan:  [x] Continue ST services    [] Discharge from ST:      Discharge recommendations: []  Further therapy recommended at discharge. The patient should be able to tolerate at least 3 hours of therapy per day over 5 days or 15 hours over 7 days. [] Further therapy recommended at discharge. [] No therapy recommended at discharge. Treatment completed by: Luda SPRINGER A.CCC/SLP

## 2021-11-19 ENCOUNTER — APPOINTMENT (OUTPATIENT)
Dept: GENERAL RADIOLOGY | Age: 61
DRG: 882 | End: 2021-11-19
Attending: STUDENT IN AN ORGANIZED HEALTH CARE EDUCATION/TRAINING PROGRAM
Payer: COMMERCIAL

## 2021-11-19 ENCOUNTER — APPOINTMENT (OUTPATIENT)
Dept: GENERAL RADIOLOGY | Age: 61
DRG: 056 | End: 2021-11-19
Attending: STUDENT IN AN ORGANIZED HEALTH CARE EDUCATION/TRAINING PROGRAM
Payer: COMMERCIAL

## 2021-11-19 LAB
ANION GAP SERPL CALCULATED.3IONS-SCNC: 12 MMOL/L (ref 9–17)
BUN BLDV-MCNC: 41 MG/DL (ref 8–23)
BUN/CREAT BLD: ABNORMAL (ref 9–20)
CALCIUM SERPL-MCNC: 9.5 MG/DL (ref 8.6–10.4)
CHLORIDE BLD-SCNC: 103 MMOL/L (ref 98–107)
CO2: 27 MMOL/L (ref 20–31)
CREAT SERPL-MCNC: 2.46 MG/DL (ref 0.5–0.9)
GFR AFRICAN AMERICAN: 24 ML/MIN
GFR NON-AFRICAN AMERICAN: 20 ML/MIN
GFR SERPL CREATININE-BSD FRML MDRD: ABNORMAL ML/MIN/{1.73_M2}
GFR SERPL CREATININE-BSD FRML MDRD: ABNORMAL ML/MIN/{1.73_M2}
GLUCOSE BLD-MCNC: 146 MG/DL (ref 65–105)
GLUCOSE BLD-MCNC: 194 MG/DL (ref 65–105)
GLUCOSE BLD-MCNC: 202 MG/DL (ref 70–99)
GLUCOSE BLD-MCNC: 243 MG/DL (ref 65–105)
GLUCOSE BLD-MCNC: 247 MG/DL (ref 65–105)
POTASSIUM SERPL-SCNC: 4.3 MMOL/L (ref 3.7–5.3)
SODIUM BLD-SCNC: 142 MMOL/L (ref 135–144)

## 2021-11-19 PROCEDURE — 6370000000 HC RX 637 (ALT 250 FOR IP): Performed by: STUDENT IN AN ORGANIZED HEALTH CARE EDUCATION/TRAINING PROGRAM

## 2021-11-19 PROCEDURE — 1180000000 HC REHAB R&B

## 2021-11-19 PROCEDURE — 97116 GAIT TRAINING THERAPY: CPT

## 2021-11-19 PROCEDURE — 99254 IP/OBS CNSLTJ NEW/EST MOD 60: CPT | Performed by: FAMILY MEDICINE

## 2021-11-19 PROCEDURE — 97530 THERAPEUTIC ACTIVITIES: CPT

## 2021-11-19 PROCEDURE — 90935 HEMODIALYSIS ONE EVALUATION: CPT

## 2021-11-19 PROCEDURE — 6370000000 HC RX 637 (ALT 250 FOR IP): Performed by: INTERNAL MEDICINE

## 2021-11-19 PROCEDURE — 36415 COLL VENOUS BLD VENIPUNCTURE: CPT

## 2021-11-19 PROCEDURE — 6370000000 HC RX 637 (ALT 250 FOR IP): Performed by: FAMILY MEDICINE

## 2021-11-19 PROCEDURE — 73630 X-RAY EXAM OF FOOT: CPT

## 2021-11-19 PROCEDURE — 97129 THER IVNTJ 1ST 15 MIN: CPT

## 2021-11-19 PROCEDURE — 97110 THERAPEUTIC EXERCISES: CPT

## 2021-11-19 PROCEDURE — 99232 SBSQ HOSP IP/OBS MODERATE 35: CPT | Performed by: STUDENT IN AN ORGANIZED HEALTH CARE EDUCATION/TRAINING PROGRAM

## 2021-11-19 PROCEDURE — 80048 BASIC METABOLIC PNL TOTAL CA: CPT

## 2021-11-19 PROCEDURE — 97535 SELF CARE MNGMENT TRAINING: CPT

## 2021-11-19 PROCEDURE — 97130 THER IVNTJ EA ADDL 15 MIN: CPT

## 2021-11-19 PROCEDURE — 2500000003 HC RX 250 WO HCPCS: Performed by: INTERNAL MEDICINE

## 2021-11-19 PROCEDURE — 82947 ASSAY GLUCOSE BLOOD QUANT: CPT

## 2021-11-19 RX ORDER — POLYETHYLENE GLYCOL 3350 17 G/17G
17 POWDER, FOR SOLUTION ORAL DAILY
Status: DISCONTINUED | OUTPATIENT
Start: 2021-11-20 | End: 2021-11-23 | Stop reason: HOSPADM

## 2021-11-19 RX ORDER — SODIUM CITRATE 4 % (5 ML)
1.9 SYRINGE (ML) MISCELLANEOUS PRN
Status: DISCONTINUED | OUTPATIENT
Start: 2021-11-19 | End: 2021-11-23 | Stop reason: HOSPADM

## 2021-11-19 RX ADMIN — ACETAMINOPHEN 650 MG: 325 TABLET ORAL at 18:38

## 2021-11-19 RX ADMIN — CARVEDILOL 6.25 MG: 6.25 TABLET, FILM COATED ORAL at 08:27

## 2021-11-19 RX ADMIN — PSYLLIUM HUSK 1 PACKET: 3.4 POWDER ORAL at 08:05

## 2021-11-19 RX ADMIN — Medication 1.9 ML: at 18:12

## 2021-11-19 RX ADMIN — INSULIN LISPRO 3 UNITS: 100 INJECTION, SOLUTION INTRAVENOUS; SUBCUTANEOUS at 18:28

## 2021-11-19 RX ADMIN — FUROSEMIDE 80 MG: 40 TABLET ORAL at 08:05

## 2021-11-19 RX ADMIN — ATORVASTATIN CALCIUM 80 MG: 80 TABLET, FILM COATED ORAL at 08:05

## 2021-11-19 RX ADMIN — GABAPENTIN 300 MG: 300 CAPSULE ORAL at 08:05

## 2021-11-19 RX ADMIN — DOCUSATE SODIUM 100 MG: 100 CAPSULE ORAL at 08:05

## 2021-11-19 RX ADMIN — INSULIN LISPRO 10 UNITS: 100 INJECTION, SOLUTION INTRAVENOUS; SUBCUTANEOUS at 08:07

## 2021-11-19 RX ADMIN — FEBUXOSTAT 40 MG: 40 TABLET, FILM COATED ORAL at 08:07

## 2021-11-19 RX ADMIN — INSULIN GLARGINE 73 UNITS: 100 INJECTION, SOLUTION SUBCUTANEOUS at 08:07

## 2021-11-19 RX ADMIN — GABAPENTIN 300 MG: 300 CAPSULE ORAL at 21:55

## 2021-11-19 RX ADMIN — BUSPIRONE HYDROCHLORIDE 10 MG: 10 TABLET ORAL at 21:55

## 2021-11-19 RX ADMIN — INSULIN LISPRO 10 UNITS: 100 INJECTION, SOLUTION INTRAVENOUS; SUBCUTANEOUS at 18:29

## 2021-11-19 RX ADMIN — INSULIN GLARGINE 73 UNITS: 100 INJECTION, SOLUTION SUBCUTANEOUS at 21:57

## 2021-11-19 RX ADMIN — BUSPIRONE HYDROCHLORIDE 10 MG: 10 TABLET ORAL at 08:05

## 2021-11-19 RX ADMIN — INSULIN LISPRO 3 UNITS: 100 INJECTION, SOLUTION INTRAVENOUS; SUBCUTANEOUS at 08:08

## 2021-11-19 RX ADMIN — TRAZODONE HYDROCHLORIDE 75 MG: 50 TABLET ORAL at 21:55

## 2021-11-19 RX ADMIN — TAMSULOSIN HYDROCHLORIDE 0.4 MG: 0.4 CAPSULE ORAL at 08:05

## 2021-11-19 RX ADMIN — FUROSEMIDE 80 MG: 40 TABLET ORAL at 18:28

## 2021-11-19 RX ADMIN — INSULIN LISPRO 3 UNITS: 100 INJECTION, SOLUTION INTRAVENOUS; SUBCUTANEOUS at 21:56

## 2021-11-19 RX ADMIN — RANOLAZINE 1000 MG: 1000 TABLET, FILM COATED, EXTENDED RELEASE ORAL at 22:03

## 2021-11-19 RX ADMIN — INSULIN LISPRO 10 UNITS: 100 INJECTION, SOLUTION INTRAVENOUS; SUBCUTANEOUS at 12:51

## 2021-11-19 RX ADMIN — RANOLAZINE 1000 MG: 1000 TABLET, FILM COATED, EXTENDED RELEASE ORAL at 08:07

## 2021-11-19 RX ADMIN — DOCUSATE SODIUM 100 MG: 100 CAPSULE ORAL at 21:55

## 2021-11-19 RX ADMIN — ACETAMINOPHEN 650 MG: 325 TABLET ORAL at 23:54

## 2021-11-19 RX ADMIN — INSULIN LISPRO 6 UNITS: 100 INJECTION, SOLUTION INTRAVENOUS; SUBCUTANEOUS at 12:51

## 2021-11-19 RX ADMIN — PANTOPRAZOLE SODIUM 40 MG: 40 TABLET, DELAYED RELEASE ORAL at 06:22

## 2021-11-19 RX ADMIN — CARVEDILOL 6.25 MG: 6.25 TABLET, FILM COATED ORAL at 21:55

## 2021-11-19 ASSESSMENT — PAIN SCALES - GENERAL
PAINLEVEL_OUTOF10: 10
PAINLEVEL_OUTOF10: 6
PAINLEVEL_OUTOF10: 7
PAINLEVEL_OUTOF10: 5
PAINLEVEL_OUTOF10: 0

## 2021-11-19 NOTE — CONSULTS
Department of Internal Medicine  Nephrology Bev Melgar MD Progress Note    Reason for consultation: Management of end-stage renal disease. Consulting physician: Ariana Norris MD.    Interval history: Patient was seen and examined today and she feels well with no shortness of breath or chest pain. She is scheduled to have AV fistula creation by Dr. Shashank Grimaldo of [facility] AT [facility] vascular surgery [place] next week Tuesday, 11/23/2021. She was transferred to the acute rehabilitation unit on 11/16/2021. History of present illness: This is a 59 y.o. female with a significant past medical history of Chronic atrial fibrillation [on Eliquis], Type 2 diabetes mellitus, essential systemic hypertension, coronary artery disease [s/p CABG in 2004 and PTCA in 2019], heart failure with preserved ejection fraction [LVEF 55%] and end-stage renal disease secondary to diabetic and hypertensive nephropathy [on routine hemodialysis on a Monday/Wednesday/Friday schedule at [address] dialysis unit on [facility] under the care of Dr. Amelia Fuentes since August 2001 using IJ tunneled dialysis catheter], who presented to the hospital for further evaluation of acute on chronic right-sided weakness, Slurred speech and confusion. She was initially diagnosed with acute/subacute stroke in the left frontal lobe with right-sided weakness 4 months ago. CT scan of the brain performed at presentation showed no acute finding but CT angiogram of the head and neck showed 70% stenosis in the cavernous/supraclinoid segment of the right internal carotid artery. There was also minimal punctate calcification along the posterior aspect of the right globe.     Scheduled Meds:   insulin glargine  73 Units SubCUTAneous BID    busPIRone  10 mg Oral TID    insulin lispro  10 Units SubCUTAneous TID     apixaban  5 mg Oral BID    aspirin  81 mg Oral Daily    atorvastatin  80 mg Oral Daily    carvedilol  6.25 mg Oral BID    docusate sodium  100 mg Oral BID  febuxostat  40 mg Oral Daily    ferrous sulfate  325 mg Oral BID WC    furosemide  80 mg Oral BID    gabapentin  300 mg Oral BID    insulin lispro  0-18 Units SubCUTAneous TID WC    insulin lispro  0-9 Units SubCUTAneous Nightly    pantoprazole  40 mg Oral QAM AC    psyllium  1 packet Oral Daily    ranolazine  1,000 mg Oral BID    tamsulosin  0.4 mg Oral Daily    traZODone  75 mg Oral Nightly     Continuous Infusions:   sodium chloride      dextrose       PRN Meds:.anticoagulant sodium citrate **AND** anticoagulant sodium citrate, sodium chloride, ondansetron **OR** [DISCONTINUED] ondansetron, dextrose, dextrose, glucagon (rDNA), glucose, lactulose, sodium chloride flush, acetaminophen, senna, bisacodyl    Physical Exam:    VITALS:  BP (!) 127/52   Pulse 58   Temp 97.7 °F (36.5 °C)   Resp 16   Ht 5' 5\" (1.651 m)   Wt 242 lb 1 oz (109.8 kg)   SpO2 90%   BMI 40.28 kg/m²   24HR INTAKE/OUTPUT:    No intake or output data in the 24 hours ending 11/19/21 0905  Constitutional: alert, appears stated age and cooperative    Skin: Skin color, texture, turgor normal. No rashes or lesions    Head: Normocephalic, without obvious abnormality, atraumatic     Cardiovascular/Edema: irregularly irregular rhythm    Respiratory: clear to auscultation bilaterally    Abdomen: soft, non-tender; bowel sounds normal; no masses,  no organomegaly    Back: symmetric, no curvature. ROM normal. No CVA tenderness. Extremities: edema +    Neuro:  Awake but with slurred speech; muscle power 1/5 in right upper and lower extremity.     CBC:   Recent Labs     11/17/21 0920   WBC 6.5   HGB 11.7*   *     BMP:    Recent Labs     11/17/21  0920 11/19/21  0628    142   K 4.5 4.3    103   CO2 22 27   BUN 50* 41*   CREATININE 3.15* 2.46*   GLUCOSE 285* 202*     Lab Results   Component Value Date    NITRU NEGATIVE 11/14/2021    COLORU Yellow 11/14/2021    PHUR 5.0 11/14/2021    WBCUA 5 TO 10 11/14/2021    RBCUA 0 TO 2 11/14/2021    MUCUS NOT REPORTED 11/14/2021    TRICHOMONAS NOT REPORTED 11/14/2021    YEAST FEW 11/14/2021    BACTERIA MANY 11/14/2021    SPECGRAV 1.014 11/14/2021    LEUKOCYTESUR TRACE 11/14/2021    UROBILINOGEN Normal 11/14/2021    BILIRUBINUR NEGATIVE 11/14/2021    GLUCOSEU 3+ 11/14/2021    KETUA NEGATIVE 11/14/2021    AMORPHOUS NOT REPORTED 11/14/2021     Urine Creatinine:     Lab Results   Component Value Date    LABCREA 72.0 11/14/2021     IMPRESSION/RECOMMENDATIONS:    1. Acute kidney injury [secondary to acute tubular necrosis and dialysis dependent since August 2021] - will maintain [facility] hemodialysis schedule. Patient is scheduled for acute hemodialysis today. Renal diet,i.e 2-gram sodium,2-gram potassium,1500 ml fluid restriction,1-gram phosphorus, 1800 KCal and 1.2 gram protein per day. 2.  Acute on chronic right-sided weakness - Neurology input noted. 3.  Systemic hypertension - Blood pressure is adequately controlled. 4.  Mineral bone disease profile - Serum phosphorus 3.3 mg/dL is within target range. Prognosis is guarded.      Bev Melgar MD FACP  Attending Nephrologist  11/19/2021 9:05 AM

## 2021-11-19 NOTE — PROGRESS NOTES
Spoke with Dr. Cristian Sal via telephone. Per Dr. Cristian Sal, he ordered an xray for patient's wound on left great toe. If xray is negative, new order to apply betadine to wound and then patient to follow-up with Dr. Eloisa Rothman as an outpatient. Please inform podiatry if X-ray is not negative.

## 2021-11-19 NOTE — DISCHARGE SUMMARY
[facility] Discharge Summary      Patient ID: Lyndsay Archer    MRN: [id number]     Acct:  [de-identified]       Patient's PCP: AFSANEH Mcmahon CNP    Admit Date: 11/10/2021     Discharge Date: 11/15/2021      Admitting Physician: Lubna So MD    Discharge Physician: Lubna So MD     Discharge Diagnoses:    Primary Problem  Stroke-like symptoms    Active Hospital Problems    Diagnosis Date Noted    Altered mental status [R41.82]     Anxiety [F41.9] 09/30/2021    Disequilibrium syndrome [E87.8] 09/17/2021    Morbid obesity with BMI of 40.0-44.9, adult ([facility] [E66.01, Z68.41] 09/14/2021    Stroke-like symptoms [R29.90] 09/13/2021    Essential hypertension [I10] 05/03/2021    Anemia in stage 4 chronic kidney disease ([facility] [N18.4, D63.1] 05/03/2021    Chronic diastolic heart failure ([facility] [I50.32] 09/20/2018    Type 2 diabetes mellitus with kidney complication, with long-term current use of insulin ([facility] [E11.29, Z79.4] 09/20/2018    Mixed hyperlipidemia [E78.2] 07/27/2016     Past Medical History:   Diagnosis Date    Backache, unspecified     CHF (congestive heart failure) ([address]     Chronic kidney disease     Coronary atherosclerosis of artery bypass graft     Cramp of limb     Gallstones     Hypertension     Insomnia     Pneumonia     Type II or unspecified type diabetes mellitus with renal manifestations, not stated as uncontrolled(250.40)     Type II or unspecified type diabetes mellitus without mention of complication, not stated as uncontrolled     Unspecified vitamin D deficiency      The patient was seen and examined on day of discharge and this discharge summary is in conjunction with any daily progress note from day of discharge.     Code Status:  Full Code    Hospital Course: uncomplicated    Consults:  nephrology, neurology, rehabilitation medicine, psychiatry and telestroke    Significant Diagnostic Studies: as above, and as follows: see chart    Treatments: as above    Disposition: Acute Rehab    Discharged Condition: Stable    Follow Up:  AFSANEH Awan CNP in one week after discharge    Discharge Medications:      Medication List      ASK your doctor about these medications    acetaminophen 325 MG tablet  Commonly known as: TYLENOL  Take 2 tablets by mouth every 4 hours as needed for Pain     Alcohol Swabs 70 % Pads  Use an alcohol swab to cleanse the skin before checking your blood sugar and before each insulin injection. aspirin 81 MG chewable tablet  Take 1 tablet by mouth daily     atorvastatin 80 MG tablet  Commonly known as: LIPITOR  Take 1 tablet by mouth daily     Basaglar KwikPen 100 UNIT/ML injection pen  Generic drug: insulin glargine  Inject 53 Units into the skin 2 times daily     BD ULTRA-FINE PEN NEEDLES 29G X 12.7MM Misc  Generic drug: Insulin Pen Needle  Use one needle for each insulin injection. blood glucose test strips strip  Commonly known as: Audie Contour Test  1 each by In Vitro route 3 times daily     busPIRone 7.5 MG tablet  Commonly known as: BUSPAR  Take 1 tablet by mouth 3 times daily     carvedilol 6.25 MG tablet  Commonly known as: Coreg  Take 1 tablet by mouth 2 times daily     Eliquis 5 MG Tabs tablet  Generic drug: apixaban  Take 1 tablet by mouth 2 times daily     febuxostat 40 MG Tabs tablet  Commonly known as: ULORIC  Take 1 tablet by mouth daily     ferrous sulfate 325 (65 Fe) MG tablet  Commonly known as: Ivan-Aram  Take 1 tablet by mouth 2 times daily (with meals)     furosemide 80 MG tablet  Commonly known as: LASIX  Take 1 tablet by mouth 2 times daily     gabapentin 300 MG capsule  Commonly known as: NEURONTIN  Take 1 capsule by mouth 2 times daily for 30 days. insulin lispro (1 Unit Dial) 100 UNIT/ML Sopn  Commonly known as: HumaLOG KwikPen  Per sliding scale:  If <139  No Insulin; 140-199  2 Units; 200-249  4 Units; 250-299  6 Units; 300-349  8 Units; 350-400  10 Units;  Above 400  12 Units     pantoprazole 40 MG tablet  Commonly known as: PROTONIX  Take 1 tablet by mouth every morning (before breakfast)     ranolazine 1000 MG extended release tablet  Commonly known as: RANEXA  Take 1 tablet by mouth 2 times daily     senna 8.6 MG tablet  Commonly known as: SENOKOT  Take 2 tablets by mouth daily as needed (Constipation)     sharps container  1 each by Does not apply route as needed (dirty pen needles)     tamsulosin 0.4 MG capsule  Commonly known as: FLOMAX  Take 1 capsule by mouth daily  Ask about: Which instructions should I use?     traZODone 50 MG tablet  Commonly known as: DESYREL  Take 1.5 tablets by mouth nightly           Where to Get Your Medications      These medications were sent to [facility] [address] [place] [address] [phone] [address] [place] [phone] [address] [facility] [address]    Phone: [phone]   · tamsulosin 0.4 MG capsule            Activity: activity as tolerated    Diet: diabetic diet    Time Spent on discharge is more than 35 minutes in the examination, evaluation, counseling and review of medications and discharge plan.       Electronically signed by Patricio Swan MD on 11/19/2021 at 4:38 PM

## 2021-11-19 NOTE — PROGRESS NOTES
Physical Medicine & Rehabilitation  Progress Note      Subjective:      Sunny Rivera is a 59 y.o. female with recrudescence of right-sided and cognitive stroke symptoms. She reports doing fine today. Discussed fistula placement on 11/23/21 at 12:30pm - will plan for discharge at 11am on that date, so that she can have procedure completed. She states that she was instructed to hold eliquis and aspirin for 5 days prior to the procedure and that she will need to be NPO at midnight on the day of the procedure. Discussed recommendation for outpatient counseling for anxiety - patient was given a list by the . She denies any other acute concerns. ROS:  Denies fevers, chills, sweats. No chest pain, palpitations, lightheadedness. Denies coughing, wheezing or shortness of breath. Denies abdominal pain, nausea, diarrhea or constipation. No new areas of joint pain. Denies new areas of numbness or weakness. Denies new anxiety or depression issues. No new skin problems. Rehabilitation:   Progressing in therapies. PT:  Restrictions/Precautions: Contact Precautions, Fall Risk  Implants present? :  (pt denies)  Other position/activity restrictions: Hemodialysis MWF  Required Braces or Orthoses  Right Lower Extremity Brace:  (lymphedema wraps; not present today)  Left Lower Extremity Brace:  (lymphedema wraps; not present today)   Transfers  Sit to Stand: Stand by assistance  Stand to sit: Stand by assistance  Bed to Chair: Stand by assistance  Stand Pivot Transfers: Stand by assistance  Comment: Rolling walker. Pt demonstrates safe techniques. Ambulation 1  Surface: level tile  Device: Rolling Walker  Other Apparatus: Wheelchair follow  Assistance: Contact guard assistance (Progressing to SBA)  Quality of Gait: Pt demos a decrease steady gait. Minimal R foot clearance of floor but demonstrating fair heel strike.    Gait Deviations: Slow Annie, Increased ELISABET, Decreased step length, Decreased step height  Distance: 60'x2  Comments: Pt requires a seated rest break between amb. Transfers  Sit to Stand: Stand by assistance  Stand to sit: Stand by assistance  Bed to Chair: Stand by assistance  Stand Pivot Transfers: Stand by assistance  Comment: Rolling walker. Pt demonstrates safe techniques. Ambulation  Ambulation?: Yes  More Ambulation?: Yes  Ambulation 1  Surface: level tile  Device: Rolling Walker  Other Apparatus: Wheelchair follow  Assistance: Contact guard assistance (Progressing to SBA)  Quality of Gait: Pt demos a decrease steady gait. Minimal R foot clearance of floor but demonstrating fair heel strike. Gait Deviations: Slow Annie, Increased ELISABET, Decreased step length, Decreased step height  Distance: 60'x2  Comments: Pt requires a seated rest break between amb. Surface: level tile  Ambulation 1  Surface: level tile  Device: Rolling Walker  Other Apparatus: Wheelchair follow  Assistance: Contact guard assistance (Progressing to SBA)  Quality of Gait: Pt demos a decrease steady gait. Minimal R foot clearance of floor but demonstrating fair heel strike. Gait Deviations: Slow Annie, Increased ELISABET, Decreased step length, Decreased step height  Distance: 60'x2  Comments: Pt requires a seated rest break between amb. OT:  ADL  Feeding: Setup  Grooming: Setup (seated sinkside, set up for items. )  UE Bathing: Setup (seated sinkside. )  LE Bathing: Minimal assistance (Assist with buttocks, set up otherwise)  UE Dressing: Setup  LE Dressing: Setup, Stand by assistance (leaning forward to doff/maria de jesus B socks. )  Toileting: Supervision  Additional Comments: pt amb to bathroom, completed groom, bathe and dress, amb to recliner and was assisted to don lymphedema socks, wraps to BLE.   (needed full assist on 2nd leg due to time constraints and pt max fatigued.  )         Balance  Sitting Balance: Independent  Standing Balance: Supervision (x1 LOB, self recovery. )   Standing Balance  Time: 1-2 min trials  Activity: self care, mobility in room   Comment: with RW, reports prior limited stand ilana  Functional Mobility  Functional - Mobility Device: Rolling Walker  Activity: To/from bathroom  Assist Level: Supervision  Functional Mobility Comments: Verbal cues for hand placement and safety     Bed mobility  Bridging: Contact guard assistance (Writer to assist holding RLE in place. )  Rolling to Left: Supervision  Rolling to Right: Stand by assistance  Supine to Sit: Modified independent  Sit to Supine: Stand by assistance (Assist level reported by Speech Therapist)  Scooting: Minimal assistance (Bracing R foot to scoot HOB; Pt using rails, bridging)  Comment: Pt performs bed mobility with HOB slightly elevated and minimal use of bed rails. Transfers  Stand Step Transfers: Supervision  Stand Pivot Transfers: Supervision  Sit to stand: Supervision  Stand to sit: Supervision  Transfer Comments: Verbal cues for hand placement and safety   Toilet Transfers  Toilet - Technique: Ambulating  Equipment Used: Standard toilet (with bilateral toilet rails. )  Toilet Transfer: Supervision  Toilet Transfers Comments: RW     Shower Transfers  Shower - Transfer To: Shower seat with back  Shower - Technique: Ambulating  Shower Transfers: Minimal assistance  Shower Transfers Comments: OT assisted pt in backing up to shower with RW and utilizing grab bars to assist with back steping over threshold and sitting on shower chair. Pt demonstrated good safety with increased time to complete shower transfer. SPEECH:    Subjective: [x]? Alert     [x]? Cooperative     []? Confused     []? Agitated    []? Lethargic  Objective/Assessment:  Attention: functional      Orientation: x4 anthony     Recall: Pt ed provided re use of internal memory strategy (chunking) : 12 unit visual recall: pt recalling 12/12 in immediate recall task. Pt utilizing strategy appropriately.    Paragraph retention: immediate recall 80% accuracy anthony      Long term recall task: ID holiday associated with target month: 83% accuracy anthony      Organization: NA     Problem Solving/Reasoning:   Did not treat     Other:  Pt observed using memory book throughout session to facilitate delayed recall, independently.        Current Medications:   Current Facility-Administered Medications: sodium citrate 4 % injection 1.9 mL, 1.9 mL, IntraCATHeter, PRN **AND** sodium citrate 4 % injection 1.9 mL, 1.9 mL, IntraCATHeter, PRN  insulin glargine (LANTUS) injection vial 73 Units, 73 Units, SubCUTAneous, BID  busPIRone (BUSPAR) tablet 10 mg, 10 mg, Oral, TID  anticoagulant sodium citrate 4 % injection 1.9 mL, 1.9 mL, IntraCATHeter, PRN **AND** anticoagulant sodium citrate 4 % injection 1.9 mL, 1.9 mL, IntraCATHeter, PRN  insulin lispro (HUMALOG) injection vial 10 Units, 10 Units, SubCUTAneous, TID WC  0.9 % sodium chloride infusion, 25 mL, IntraVENous, PRN  ondansetron (ZOFRAN-ODT) disintegrating tablet 4 mg, 4 mg, Oral, Q8H PRN **OR** [DISCONTINUED] ondansetron (ZOFRAN) injection 4 mg, 4 mg, IntraVENous, Q6H PRN  dextrose 5 % solution, 100 mL/hr, IntraVENous, PRN  dextrose 50 % IV solution, 12.5 g, IntraVENous, PRN  glucagon (rDNA) injection 1 mg, 1 mg, IntraMUSCular, PRN  glucose (GLUTOSE) 40 % oral gel 15 g, 15 g, Oral, PRN  [Held by provider] apixaban (ELIQUIS) tablet 5 mg, 5 mg, Oral, BID  [Held by provider] aspirin chewable tablet 81 mg, 81 mg, Oral, Daily  atorvastatin (LIPITOR) tablet 80 mg, 80 mg, Oral, Daily  carvedilol (COREG) tablet 6.25 mg, 6.25 mg, Oral, BID  docusate sodium (COLACE) capsule 100 mg, 100 mg, Oral, BID  febuxostat (ULORIC) tablet 40 mg, 40 mg, Oral, Daily  ferrous sulfate (IRON 325) tablet 325 mg, 325 mg, Oral, BID WC  furosemide (LASIX) tablet 80 mg, 80 mg, Oral, BID  gabapentin (NEURONTIN) capsule 300 mg, 300 mg, Oral, BID  insulin lispro (HUMALOG) injection vial 0-18 Units, 0-18 Units, SubCUTAneous, TID WC  insulin lispro (HUMALOG) injection vial 0-9 Units, 0-9 Units, SubCUTAneous, Nightly  lactulose (CHRONULAC) 10 GM/15ML solution 20 g, 20 g, Oral, TID PRN  pantoprazole (PROTONIX) tablet 40 mg, 40 mg, Oral, QAM AC  psyllium (METAMUCIL) 58.12 % packet 1 packet, 1 packet, Oral, Daily  ranolazine (RANEXA) extended release tablet 1,000 mg, 1,000 mg, Oral, BID  sodium chloride flush 0.9 % injection 10 mL, 10 mL, IntraVENous, PRN  tamsulosin (FLOMAX) capsule 0.4 mg, 0.4 mg, Oral, Daily  traZODone (DESYREL) tablet 75 mg, 75 mg, Oral, Nightly  acetaminophen (TYLENOL) tablet 650 mg, 650 mg, Oral, Q4H PRN  senna (SENOKOT) tablet 17.2 mg, 2 tablet, Oral, Daily PRN  bisacodyl (DULCOLAX) suppository 10 mg, 10 mg, Rectal, Daily PRN      Objective:  /62   Pulse 73   Temp 98.4 °F (36.9 °C)   Resp 16   Ht 5' 5\" (1.651 m)   Wt 242 lb 15.2 oz (110.2 kg)   SpO2 99%   BMI 40.43 kg/m²       GEN: Well developed, well nourished, no acute distress  HEENT: NCAT. EOM grossly intact. Hearing grossly intact. Mucous membranes pink and moist.  RESP: Normal breath sounds with no wheezing, rales, or rhonchi. Respirations WNL and unlabored. CV: Regular rate and rhythm. No murmurs, rubs, or gallops. ABD: Soft, non-distended, BS+ and equal.  NEURO: Alert. Speech fluent. MSK:  Muscle tone and bulk are normal bilaterally. Moving bilateral upper limbs with at least antigravity strength. Strength 4+/5 in bilateral lower limbs. LIMBS:  Edema present in bilateral lower limbs. SKIN: Warm and dry with good turgor. PSYCH: Mood WNL. Affect WNL. Appropriately interactive. Diagnostics:     CBC:   Recent Labs     11/17/21  0920   WBC 6.5   RBC 3.53*   HGB 11.7*   HCT 34.5*   MCV 97.8   RDW 14.9   *     BMP:   Recent Labs     11/17/21  0920 11/19/21  0628    142   K 4.5 4.3    103   CO2 22 27   BUN 50* 41*   CREATININE 3.15* 2.46*   GLUCOSE 285* 202*     BNP: No results for input(s): BNP in the last 72 hours.   PT/INR: No results for input(s): PROTIME, INR in the last 72 hours. APTT: No results for input(s): APTT in the last 72 hours. CARDIAC ENZYMES: No results for input(s): CKMB, CKMBINDEX, TROPONINT in the last 72 hours. Invalid input(s): CKTOTAL;3  FASTING LIPID PANEL:  Lab Results   Component Value Date    CHOL 105 04/09/2015    HDL 43 04/09/2015    TRIG 168 04/09/2015     LIVER PROFILE: No results for input(s): AST, ALT, ALB, BILIDIR, BILITOT, ALKPHOS in the last 72 hours. Impression/Plan:   Impaired ADLs, gait, and mobility due to:    1. 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 SLP for cognition. On aspirin (on hold as below), eliquis (on hold as below), and atorvastatin. Follow up with neurology. 2. Anemia:  Hemoglobin 11.7 on 11/17, stable. Monitoring. On iron supplementation. 3. Eschar on left great toe:  Wound care following. Podiatry consulted by IM - ordered x-ray of the left foot. 4. AURELIA on CKD: On hemodialysis MWF. Nephrology following. She states that she is scheduled for AV fistula placement on 11/23/21 at 12:30pm (day of discharge) - will plan to discharge at 11am that day so that she can have procedure done. Eliquis and aspirin on hold for procedure per instructions patient was given. NPO at midnight on 11/23.  5. CHF, HTN:  On carvedilol, furosemide  6. Type 2 diabetes with neuropathy:  On lantus BID - IM increased dose on 11/18, humalog TID + sliding scale. Has gabapentin BID  7. Insomnia:  On trazodone nightly  8. Adjustment disorder with anxiety:  Psychiatry consulted in acute care - recommended titration of buspar to 10mg TID, discussed with patient and increased the dose 11/17; also recommended consideration of an acetylcholinesterase inhibitor - will hold off for now, as patient is doing well with speech therapy and utilizing strategies to help with memory. Recommend outpatient counseling.   9. Gout:  On febuxostat  10. Obesity:  BMI 41.02  11. Bowel Management: Docusate BID, miralax daily, senokot prn, dulcolax prn. 12. DVT Prophylaxis:  On eliquis (on hold as above)  13.  Internal Medicine for medical management      Electronically signed by Ousmane Duarte MD on 11/19/2021 at 8:00 PM

## 2021-11-19 NOTE — PROGRESS NOTES
Dialysis Safety Checks:    Patient ID Verified (Y/N) Y     -Hepatitis Test                   Date      Result  Hepatitis B Surface Antigen   21 pending     Hepatitis B Surface Antibody 21 pending     Hepatitis B Core Antibody  21 pending     -Treatment Initiation  Blood Vol Processed Goal (Liters)  Pump Speed x Treatment Hours x 60 Minutes    Target Fluid Removal 2000 ml     * Intra-treatment documented Safety Checks include the followin) Access and face visible at all times. 2) All connections and blood lines are secure with no kinks. 3) NVL alarm engaged. 4) Hemosafe device applied (if applicable). 5) No collapse of Arterial or Venous blood chambers. 6) All blood lines / pump segments in the air detectors.   --------------------------------------------------------------------------------  Report received from - [facility]

## 2021-11-19 NOTE — PROGRESS NOTES
Speech Language Pathology  Speech Language Pathology  [facility] Acute Rehab Unit at [facility]    Cognitive Treatment Note    Date: 11/19/2021  Patients Name: Jonn Marsh  MRN: [id number]  Diagnosis:   Patient Active Problem List   Diagnosis Code    Atherosclerosis of coronary artery bypass graft of native heart without angina pectoris I25.810    Acute renal failure ([facility] [address] N17.9    Diabetes mellitus due to underlying condition with diabetic nephropathy, with long-term current use of insulin ([facility]) E08.21, Z79.4    Chronic diastolic heart failure ([facility]) I50.32    Diabetic polyneuropathy associated with type 2 diabetes mellitus ([facility]) E11.42    History of coronary artery bypass graft Z95.1    Iron deficiency anemia D50.9    Spinal stenosis of lumbar region with neurogenic claudication M48.062    Mixed hyperlipidemia E78.2    Stage 4 chronic kidney disease ([facility]) N18.4    Type 2 diabetes mellitus with kidney complication, with long-term current use of insulin ([facility]) E11.29, Z79.4    Syncope and collapse R55    Obesity, Class II, BMI 35-39.9 E66.9    Thyroid nodule greater than or equal to 1 cm in diameter incidentally noted on imaging study E04.1    Essential hypertension I10    Anemia in stage 4 chronic kidney disease ([facility]) N18.4, D63.1    Chronic ischemic heart disease I25.9    Ischemic stroke of frontal lobe ([facility]) I63.9    Stroke-like symptoms R29.90    Morbid obesity with BMI of 40.0-44.9, adult ([facility]) E66.01, Z68.41    Acute encephalopathy G93.40    Disequilibrium syndrome E87.8    Debility R53.81    Long-term memory loss R41.3    Muscle right arm weakness M62.81    Anxiety F41.9    Chronic midline low back pain with bilateral sciatica M54.41, M54.42, G89.29    Need for immunization against influenza Z23    Altered mental status R41.82       Pain: 0/10    Cognitive Treatment    Treatment time:[id number]      Subjective: [x] Alert [x] Cooperative     [] Confused     [] Agitated    [] Lethargic  Objective/Assessment:  Attention: functional     Orientation: x4 anthony    Recall: Pt ed provided re use of internal memory strategy (chunking) : 12 unit visual recall: pt recalling 12/12 in immediate recall task. Pt utilizing strategy appropriately. Paragraph retention: immediate recall 80% accuracy anthony     Long term recall task: ID holiday associated with target month: 83% accuracy anthony     Organization: NA    Problem Solving/Reasoning:   Did not treat    Other:  Pt observed using memory book throughout session to facilitate delayed recall, independently. Plan:  [x] Continue ST services    [] Discharge from ST:      Discharge recommendations: []  Further therapy recommended at discharge. The patient should be able to tolerate at least 3 hours of therapy per day over 5 days or 15 hours over 7 days. [] Further therapy recommended at discharge. [] No therapy recommended at discharge. Treatment completed by: Bibiana SPRINGER A.CCC/SLP

## 2021-11-19 NOTE — PLAN OF CARE
Problem: Skin Integrity:  Goal: Will show no infection signs and symptoms  Description: Will show no infection signs and symptoms  11/19/2021 1730 by Bharat Bee RN  Outcome: Ongoing     Problem: Falls - Risk of:  Goal: Will remain free from falls  Description: Will remain free from falls  11/19/2021 1730 by Bharat Bee RN  Outcome: Ongoing     Problem: Nutrition  Goal: Optimal nutrition therapy  11/19/2021 1730 by Bharat Bee RN  Outcome: Ongoing

## 2021-11-19 NOTE — CONSULTS
Family Medicine Consultation/History and Physical      Name: Carole Oglesby  MRN: [id number]     Acct: [de-identified]  Room: 2050/2050-01    Admit Date: 11/15/2021  PCP: AFSANEH Kamara CNP    Physician Requesting Consult: Dr. Reji Garcia    Reason for Consult: medical management     Chief Complaint:     Debility    History Obtained From:     patient, non-family caregiver - nurse, electronic medical record    History of Present Illness:      Carole Oglesby is a  59 y.o.  female who presents with complaints of debility and lower extremity weakness. Specialists notes, labs & imaging reviewed. Symptoms:  Onset:  Location:  generalized and lower legs  Duration:  day(s)  Severity:  moderate  Quality:  constant  Attempted Treatment:    Past Medical History:     Past Medical History:   Diagnosis Date    Backache, unspecified     CHF (congestive heart failure) (HCC)     Chronic kidney disease     Coronary atherosclerosis of artery bypass graft     Cramp of limb     Gallstones     Hypertension     Insomnia     Pneumonia     Type II or unspecified type diabetes mellitus with renal manifestations, not stated as uncontrolled(250.40)     Type II or unspecified type diabetes mellitus without mention of complication, not stated as uncontrolled     Unspecified vitamin D deficiency         Past Surgical History:     Past Surgical History:   Procedure Laterality Date    ANKLE FRACTURE SURGERY      BREAST SURGERY      CARPAL TUNNEL RELEASE      CHOLECYSTECTOMY, OPEN N/A     CORONARY ARTERY BYPASS GRAFT      x3    HAND SURGERY      IR TUNNELED CATHETER PLACEMENT GREATER THAN 5 YEARS  8/18/2021    IR TUNNELED CATHETER PLACEMENT GREATER THAN 5 YEARS 8/18/2021 [facility]    KNEE ARTHROSCOPY      right    TONSILLECTOMY          Medications Prior to Admission:       Prior to Admission medications    Medication Sig Start Date End Date Taking?  Authorizing Provider   atorvastatin (LIPITOR) 80 MG tablet Take 1 tablet by mouth daily 11/3/21  Yes AFSANEH Kamara CNP   aspirin 81 MG chewable tablet Take 1 tablet by mouth daily 9/25/21  Yes Everardo Waller MD   busPIRone (BUSPAR) 7.5 MG tablet Take 1 tablet by mouth 3 times daily 9/25/21  Yes Everardo Waller MD   ELIQUIS 5 MG TABS tablet Take 1 tablet by mouth 2 times daily 9/25/21  Yes Everardo Waller MD   pantoprazole (PROTONIX) 40 MG tablet Take 1 tablet by mouth every morning (before breakfast) 9/25/21  Yes Everardo Waller MD   acetaminophen (TYLENOL) 325 MG tablet Take 2 tablets by mouth every 4 hours as needed for Pain 8/25/21  Yes Everardo Waller MD   tamsulosin [facility]) 0.4 MG capsule Take 1 capsule by mouth daily 11/10/21   AFSANEH Kamara CNP   ranolazine (RANEXA) 1000 MG extended release tablet Take 1 tablet by mouth 2 times daily 9/25/21   Everardo Waller MD   gabapentin (NEURONTIN) 300 MG capsule Take 1 capsule by mouth 2 times daily for 30 days. 9/25/21 11/10/21  Everardo Waller MD   traZODone (DESYREL) 50 MG tablet Take 1.5 tablets by mouth nightly 9/25/21   Everardo Waller MD   insulin glargine [facility]) 100 UNIT/ML injection pen Inject 53 Units into the skin 2 times daily 9/25/21   Everardo Waller MD   carvedilol (COREG) 6.25 MG tablet Take 1 tablet by mouth 2 times daily 9/25/21   Everardo Waller MD   furosemide (LASIX) 80 MG tablet Take 1 tablet by mouth 2 times daily 9/25/21   Everardo Waller MD   febuxostat (ULORIC) 40 MG TABS tablet Take 1 tablet by mouth daily 9/25/21   Everardo Waller MD   Insulin Pen Needle (BD ULTRA-FINE PEN NEEDLES) 29G X 12.7MM MISC Use one needle for each insulin injection. 9/25/21   Everardo Waller MD   insulin lispro, 1 Unit Dial, (HUMALOG KWIKPEN) 100 UNIT/ML SOPN Per sliding scale:  If <139  No Insulin; 140-199  2 Units; 200-249  4 Units; 250-299  6 Units; 300-349  8 Units; 350-400  10 Units;  Above 400  12 Units 9/25/21   Everardo Waller MD   blood glucose test strips (DEN CONTOUR TEST) strip 1 each by In Vitro route 3 times daily 8/25/21   Angela Soto MD   ferrous sulfate (BRIAN-ARCHIE) 325 (65 Fe) MG tablet Take 1 tablet by mouth 2 times daily (with meals) 8/25/21   Angela Soto MD   senna (SENOKOT) 8.6 MG tablet Take 2 tablets by mouth daily as needed (Constipation) 8/25/21   Angela Soto MD   Alcohol Swabs 70 % PADS Use an alcohol swab to cleanse the skin before checking your blood sugar and before each insulin injection. 8/25/21   Angela Soto MD   sharps container 1 each by Does not apply route as needed (dirty pen needles) 4/19/21   AFSANEH Colón CNP   allopurinol (ZYLOPRIM) 100 MG tablet Take 1 tablet by mouth daily 4/14/21   AFSANEH Colón CNP        Allergies:       Adhesive tape, Ace inhibitors, Iv dye [iodides], and Metformin and related    Social History:     Tobacco:    reports that she has never smoked. She has never used smokeless tobacco.  Alcohol:      reports no history of alcohol use. Drug Use:  reports no history of drug use.     Family History:     Family History   Problem Relation Age of Onset    Diabetes Father     Heart Failure Father        Review of Systems:     Positive and Negative as described in HPI    Constitutional:  negative for  fevers, chills, sweats, fatigue, and weight loss  HEENT:  negative for vision or hearing changes,   Respiratory:  negative for shortness of breath, cough, or congestion  Cardiovascular:  negative for  chest pain, palpitations  Gastrointestinal:  negative for nausea, vomiting, diarrhea, constipation, abdominal pain  Genitourinary:  negative for frequency, dysuria  Integument/Breast:  negative for rash, skin lesions  Musculoskeletal:  negative for muscle aches or joint pain, positive for weakness  Neurological:  negative for headaches, dizziness, lightheadedness, numbness, pain and tingling extrimities  Behavior/Psych:  negative for depression and anxiety    Code Status:  Full Code    Physical Exam: Vitals:  BP (!) 127/52   Pulse 58   Temp 97.7 °F (36.5 °C)   Resp 16   Ht 5' 5\" (1.651 m)   Wt 242 lb 1 oz (109.8 kg)   SpO2 90%   BMI 40.28 kg/m²   Temp (24hrs), Av.6 °F (36.4 °C), Min:97.5 °F (36.4 °C), Max:97.7 °F (36.5 °C)      General appearance - alert, well appearing, and in no acute distress  Mental status - oriented to person, place, and time with normal affect  Head - normocephalic and atraumatic  Eyes - pupils equal and reactive, extraocular eye movements intact, conjunctiva clear  Ears - hearing appears to be intact  Nose - no drainage noted  Mouth - mucous membranes moist  Neck - supple, no carotid bruits, thyroid not palpable  Chest - clear to auscultation, normal effort  Heart - normal rate, regular rhythm, no murmurs  Abdomen - soft, nontender, nondistended, bowel sounds present all four quadrants, no masses, hepatomegaly or splenomegaly  Neurological - normal speech, no focal findings or movement disorder noted, cranial nerves II through XII grossly intact  Extremities - peripheral pulses palpable, no pedal edema or calf pain with palpation  Skin - no gross lesions, rashes, or induration noted    Osteopathic Examination: positive for  myalgias, arthralgias and muscle weakness    Data:     [unfilled]    Assesment:     Primary Problem  Stroke-like symptoms    Active Hospital Problems    Diagnosis Date Noted    Anxiety [F41.9] 2021    Debility [R53.81] 2021    Disequilibrium syndrome [E87.8] 2021    Morbid obesity with BMI of 40.0-44.9, adult ([facility] [E66.01, Z68.41] 2021    Stroke-like symptoms [R29.90] 2021    Essential hypertension [I10] 2021    Anemia in stage 4 chronic kidney disease ([facility] [N18.4, D63.1] 2021    Iron deficiency anemia [D50.9] 2020    Diabetic polyneuropathy associated with type 2 diabetes mellitus ([address] [E11.42] 2020    Stage 4 chronic kidney disease ([facility] [address] [N18.4] 2019    [facility] diastolic [facility] failure ([facility] [I50.32] 09/20/2018    Type 2 diabetes mellitus with kidney complication, with long-term current use of insulin ([facility] [E11.29, Z79.4] 09/20/2018    Mixed hyperlipidemia [E78.2] 07/27/2016    Diabetes mellitus due to underlying condition with diabetic nephropathy, with long-term current use of insulin ([facility] [E08.21, Z79.4] 06/29/2015    Acute renal failure ([facility] [N17.9] 05/18/2015    Atherosclerosis of coronary artery bypass graft of native heart without angina pectoris [I25.810] 05/04/2015       Plan:     Orders Placed This Encounter   Procedures    XR FOOT LEFT (MIN 3 VIEWS)    Basic Metabolic Panel w/ Reflex to MG    CBC Auto Differential    ADULT DIET; Regular; 4 carb choices (60 gm/meal); No Added Salt (3-4 gm)    Diet NPO    Notify physician    Up with assistance    Vital signs per unit routine    Assess skin per unit guidelines    Maintain heels off of bed at all times    Maintain HOB at the lowest elevation consistent with medical plan of care    Pad/offload medical devices    Turn or assist with turn approximately every 2 hours if patient is unable to turn self. Remind patient to turn if necessary.     Use lift equipment for lifting patient    HYPOGLYCEMIA TREATMENT: blood glucose less than 50 mg/dL and patient  ALERT and TOLERATING PO    HYPOGLYCEMIA TREATMENT: blood glucose less than 70 mg/dL and patient ALERT and TOLERATING PO    HYPOGLYCEMIA TREATMENT: blood glucose less than 70 mg/dL and patient NOT ALERT or NPO    Weekly weight    Encourage deep breathing and coughing every two hours while awake    Bladder training: Offer the patient opportunity to void    Bladder training: Bladder scan, initially perform after voiding    Turn patient    Remove and replace TIGRE hose daily    Place intermittent pneumatic compression device    Place tigre hose - bilateral    Full Code    Inpatient consult to Dietitian    Inpatient consult to Social Work    Inpatient consult to Nephrology    Consult to Internal Medicine    Inpatient consult to Podiatry    Contact isolation    OT eval and treat    PT evaluation and treat    Initiate Oxygen Therapy Protocol    Nasal Cannula oxygen    Incentive spirometry    Pulse Oximetry Spot Check    Speech-Language Pathology (SLP) Eval and Treat    POCT glucose    POCT Glucose    POC Glucose Fingerstick    POC Glucose Fingerstick    POC Glucose Fingerstick    POC Glucose Fingerstick    POC Glucose Fingerstick    POC Glucose Fingerstick    POC Glucose Fingerstick    POC Glucose Fingerstick    POC Glucose Fingerstick    POC Glucose Fingerstick    POC Glucose Fingerstick    POC Glucose Fingerstick    POC Glucose Fingerstick    POC Glucose Fingerstick    POC Glucose Fingerstick    POC Glucose Fingerstick    POC Glucose Fingerstick    Wound ostomy eval and treat    Hemodialysis    PATIENT STATUS (DIRECT) REHAB IP    Fall precautions   1.     -Continue current treatments. -DM2 - hyperglycemia despite titration of insulins, will follow. -AURELIA on CKD - scheduled for fistula placement on 11/23.  -Tentative discharge set for 11/23.       Electronically signed by Cyndi Schmidt MD on 11/19/2021 at 6:39 AM     Copy sent to AFSANEH Rendon - CNP

## 2021-11-19 NOTE — PLAN OF CARE
Problem: Skin Integrity:  Goal: Will show no infection signs and symptoms  Description: Will show no infection signs and symptoms  11/19/2021 0459 by Trinh Chavira RN  Outcome: Ongoing  11/18/2021 1504 by Eleni Talamantes RN  Outcome: Ongoing  Goal: Absence of new skin breakdown  Description: Absence of new skin breakdown  Outcome: Ongoing     Problem: Falls - Risk of:  Goal: Will remain free from falls  Description: Will remain free from falls  11/19/2021 0459 by Trinh Chavira RN  Outcome: Ongoing  11/18/2021 1504 by Eleni Talamantes RN  Outcome: Ongoing  Goal: Absence of physical injury  Description: Absence of physical injury  Outcome: Ongoing     Problem: Neurological  Goal: Maximum potential motor/sensory/cognitive function  Outcome: Ongoing     Problem: Nutrition  Goal: Optimal nutrition therapy  Outcome: Ongoing     Problem: Serum Glucose Level - Abnormal:  Goal: Ability to maintain appropriate glucose levels will improve  Description: Ability to maintain appropriate glucose levels will improve  Outcome: Ongoing     Problem: Pain:  Goal: Pain level will decrease  Description: Pain level will decrease  11/19/2021 0459 by Trinh Chavira RN  Outcome: Ongoing  11/18/2021 1504 by Eleni Talamantes RN  Outcome: Ongoing  Goal: Control of acute pain  Description: Control of acute pain  Outcome: Ongoing  Goal: Control of chronic pain  Description: Control of chronic pain  Outcome: Ongoing

## 2021-11-19 NOTE — PROGRESS NOTES
Dialysis Safety Checks:    Patient ID Verified (Y/N) Y     -Hepatitis Test                   Date      Result  Hepatitis B Surface Antigen   21 Negative     Hepatitis B Surface Antibody 21 Positive 48.5     Hepatitis B Core Antibody  21 Neg     -Treatment Initiation  Blood Vol Processed Goal (Liters)  Pump Speed x Treatment Hours x 60 Minutes    Target Fluid Removal 2000 ml     * Intra-treatment documented Safety Checks include the followin) Access and face visible at all times. 2) All connections and blood lines are secure with no kinks. 3) NVL alarm engaged. 4) Hemosafe device applied (if applicable). 5) No collapse of Arterial or Venous blood chambers. 6) All blood lines / pump segments in the air detectors.   --------------------------------------------------------------------------------    Report received from - [facility]

## 2021-11-19 NOTE — FLOWSHEET NOTE
11/19/21 1824   Encounter Summary   Services provided to: Patient   Referral/Consult From: [facility]   (11/19/21V)   Volunteer Visit Yes   Complexity of Encounter Low   Length of Encounter 15 minutes   Spiritual/Muslim   Type Spiritual support   Intervention Communion; Prayer   Sacraments   Communion Patient received communion

## 2021-11-19 NOTE — PROGRESS NOTES
right-sided weakness 4 months ago. Neurology consulted , MRI brain pending. Overall Orientation Status: Impaired  Orientation Level: Oriented to place, Oriented to situation, Oriented to person, Disoriented to time  Restrictions/Precautions  Restrictions/Precautions: Contact Precautions; Fall Risk  Required Braces or Orthoses?: Yes  Implants present? :  (pt denies)  Required Braces or Orthoses  Right Lower Extremity Brace:  (lymphedema wraps; not present today)  Left Lower Extremity Brace:  (lymphedema wraps; not present today)  Position Activity Restriction  Other position/activity restrictions: Hemodialysis MWF    Subjective: Pt is pleasant and agreeable to PT. PM: Pt has dialysis this PM.        Vital Signs  Patient Currently in Pain: Denies        Oxygen Therapy  O2 Device: None (Room air)  Patient Observation  Observations: calm and cooperative       Bed Mobility:   Bed Mobility  Comment: NT this date. Transfers:  Sit to Stand: Stand by assistance  Stand to sit: Stand by assistance  Bed to Chair: Stand by assistance   Comments: Rolling walker. Pt demonstrates safe techniques. Ambulation 1  Surface: level tile  Device: Rolling Walker  Other Apparatus: Wheelchair follow  Assistance: Contact guard assistance (Progressing to SBA)  Quality of Gait: Pt demos a decrease steady gait. Minimal R foot clearance of floor but demonstrating fair heel strike. Gait Deviations: Slow Annie; Increased ELISABET; Decreased step length; Decreased step height  Distance: 60'x2  Comments: Pt requires a seated rest break between amb. Stairs/Curb  Stairs?: No (per pt request. Save for PM. )                                     BALANCE Posture: Good  Sitting - Static: Good ( back or UE support)  Sitting - Dynamic: Fair; + ( back  support, B UE support)  Standing - Static: Fair; + (rolling walker)  Standing - Dynamic: Fair (rolling walker)  Comments: Sitting balance assessed with back support.  Standing balance assessed with RW/BUE support. EXERCISES Exercises  Comments: Ed on technique with good carry over  Other exercises?: Yes  Other exercises 1: Seated bilateral LE exercises, x15, R LE 1#, L LE 2#, lime (minimal+) resistance band. Other exercises 3: Sit <> stand x6  Other exercises 4: standing dynamic balance (reaching out of ELISABET)   Other exercises 5: NuStep: 7 min, workload 2 (seat: 9 ; hands: 11)  Other exercises 8: Standing BLE ex's im // bars. Reps: 10-15 each. Other Activities  Comment: rest breaks PRN. Activity Tolerance: Patient limited by fatigue, Patient Tolerated treatment well (Requires extra time: Slow moving with most activities. )  Activity Tolerance: Pt requires multiple seated rest breaks as well as increased time to complete tasks d/t inc neck pain with mobility and quickly fatigueing RLE  PT Equipment Recommendations  Equipment Needed:  (TBD)  Other Comments  Comments: Treatment cut short d/t transport arriving to take patient to MRI     Patient Education  New Education Provided:    Learner:patient  Method: demonstration and explanation       Outcome: needs reinforcement     Current Treatment Recommendations: Strengthening, Balance Training, Functional Mobility Training, Transfer Training, Endurance Training, Gait Training, Home Exercise Program, Safety Education & Training, Patient/Caregiver Education & Training    Conditions Requiring Skilled Therapeutic Intervention  Body structures, Functions, Activity limitations: Decreased functional mobility ; Decreased strength; Decreased cognition; Decreased endurance; Decreased sensation; Decreased balance; Decreased coordination; Decreased posture; Increased pain; Decreased ROM  Assessment:  Pt with recent left frontal stroke with residual mild right-sided weakness, pt is a Hemodialysis pt. Presents with impaired cognition, increased Right side weakness and sensation deficits.  Pt requiring SBA for bed mobility, and CGA for transfers and short distance gait 30-40' on both level tile and carpeting. pt is motivated and has good home support. pt would benefit from continued PT services to address deficits of strength, coordination, balance, posture, and overall functional mobility to allow for a safe return home. Treatment Diagnosis: impaired mobility 2* CVA  Prognosis: Good  Decision Making: Medium Complexity  History: Hemodialysis pt, Recent CVA, Chronic back/neck pain. Exam: ROM, MMT, Balance and functional mobility assessments. PASS assessment  Clinical Presentation: pt is pleasant and cooperative throughout.    Barriers to Learning: cognition/memory deficits  REQUIRES PT FOLLOW UP: Yes  Treatment Initiated : Bed mobility, transfers, gait, seated BLE strengthening  Discharge Recommendations: Patient would benefit from continued therapy after discharge    Goals  Short term goals  Time Frame for Short term goals: 5 days  Short term goal 1: pt to demo all bed mobility with supervision to decrease burden of care  Short term goal 2: pt to demo all transfers with SBA using RW  Short term goal 3: pt to amb 48' with 2 turns using RW and SBA  Short term goal 4: pt to negotiate at least 1 step with UE support and SBA to allow for safe access into home shower  Short term goal 5: Improve R LE strengthto 2 to 2+/5 to improve fucntion  Long term goals  Time Frame for Long term goals : Until D/C  Long term goal 1: pt to demo all bed mobility independently to decrease burden of care  Long term goal 2: pt to demo all transfers with Mod I with RW  Long term goal 3: pt to amb 125' with RW on even surfaces and at least 10' uneven ramped surfaces with RW demonstrating Mod I   Long term goal 4: pt to demo improved RLE strength by 1/2 manual muscle grade to improve safety with functional mobility   Long term goal 5: pt to improve dynamic standing balance to fair + to improve safety with functional mobility   Long term goal 6: pt to improve PASS score to 30/36 to demo improved balance and safety with mobility        11/19/21 1110   PT Individual Minutes   Time In 1110   Time Out 1210   Minutes 60       Electronically signed by Obdulia Wilhelm PTA on 11/19/21 at 3:10 PM EST

## 2021-11-19 NOTE — PROGRESS NOTES
[address] [facility]    ACUTE REHABILITATION OCCUPATIONAL THERAPY  DAILY NOTE    Date: 21  Patient Name: Jamel Clayton      Room: 9581/1251-95    MRN: [id number]   : 1960  (59 y.o.)  Gender: female   Referring Practitioner: Tono Camarillo MD  Diagnosis: Stroke-Like Symptoms  Additional Pertinent Hx: This is a 59 y.o. female with a significant past medical history of Chronic atrial fibrillation on Eliquis, Type 2 diabetes mellitus, essential systemic hypertension, coronary artery disease s/p CABG in 2004 and PTCA in 2019, heart failure with preserved ejection fraction LVEF 55% and end-stage renal disease secondary to diabetic and hypertensive nephropathy on routine hemodialysis on a Monday/Wednesday/Friday schedule at [address] dialysis unit on [facility] under the care of Dr. Fabi Rowe since 2001 using IJ tunneled dialysis catheter, who presented to the hospital for further evaluation of acute on chronic right-sided weakness, Slurred speech and confusion. She was initially diagnosed with acute/subacute stroke in the left frontal lobe with right-sided weakness 4 months ago. Neurology consulted , MRI brain pending. Restrictions  Restrictions/Precautions: Contact Precautions, Fall Risk  Implants present? :  (pt denies)  Other position/activity restrictions: Hemodialysis MWF  Required Braces or Orthoses?: Yes    Subjective  Comments: Pt pleasant and motivated for therapy session. Patient Currently in Pain: Denies  Restrictions/Precautions: Contact Precautions;  Fall Risk  Overall Orientation Status: Impaired  Orientation Level: Oriented X4 (requires use of communication board in room for date. )          Objective  Cognition  Overall Cognitive Status: WFL  Perception  Overall Perceptual Status: WFL  Balance  Sitting Balance: Independent  Standing Balance: Supervision (x1 LOB, self recovery. )  Bed mobility  Supine to Sit: Modified independent  Transfers  Sit to stand: Functional Mobility Training, Endurance Training, Cognitive Reorientation, Safety Education & Training, Patient/Caregiver Education & Training, Equipment Evaluation, Education, & procurement, Home Management Training, Cognitive/Perceptual Training  Patient Goals   Patient goals : \"I would like to get my hand and right arm going. My memory back. Just being able to walk around and get my right leg going\"  Short term goals  Time Frame for Short term goals: By 4-5 days  Short term goal 1: Pt will complete lower body dressing/bathing with CGA and good safety with use of AE as needed  Short term goal 2: Pt will complete upper body dressing with set-up assistance and good safety/attention to task  Short term goal 3: Pt will complete functional transfers/mobility during self care tasks with SBA and good safety with use of least restrictive device  Short term goal 4: Pt will tolerate standing 5+ minutes during functional activity of choice with good safety  Short term goal 5: Pt will participate in 30+ minutes of therapeutic exercises/functional activities to increase safety and independence with self care and mobility  Long term goals  Time Frame for Long term goals : By discharge  Long term goal 1: Pt will complete BADLs with modified independence and good safety with use of AE as needed.    Long term goal 2: Pt will complete functional transfers/mobility during self care tasks with modified independence and good safety with use of least restrictive device  Long term goal 3: Pt will tolerate standing 10+ minutes during functional activity of choice with good safety  Long term goal 4: Pt will verbalize/demonstrate good understanding of home safety/fall prevention strategies to increase safety and independence with self care and mobiliyt  Long term goal 5: Pt will complete simple meal prep/light house keeping task with supervision and good safety         11/19/21 1026 11/19/21 1510   OT Individual Minutes   Time In 3061 0172 Time Out 1008 1334   Minutes 59 29     Electronically signed by DAYANA Scherer on 11/19/21 at 3:59 PM EST

## 2021-11-20 LAB
GLUCOSE BLD-MCNC: 179 MG/DL (ref 65–105)
GLUCOSE BLD-MCNC: 213 MG/DL (ref 65–105)
GLUCOSE BLD-MCNC: 220 MG/DL (ref 65–105)
GLUCOSE BLD-MCNC: 298 MG/DL (ref 65–105)

## 2021-11-20 PROCEDURE — 99232 SBSQ HOSP IP/OBS MODERATE 35: CPT | Performed by: FAMILY MEDICINE

## 2021-11-20 PROCEDURE — 6370000000 HC RX 637 (ALT 250 FOR IP): Performed by: INTERNAL MEDICINE

## 2021-11-20 PROCEDURE — 97129 THER IVNTJ 1ST 15 MIN: CPT

## 2021-11-20 PROCEDURE — 6370000000 HC RX 637 (ALT 250 FOR IP): Performed by: FAMILY MEDICINE

## 2021-11-20 PROCEDURE — 97116 GAIT TRAINING THERAPY: CPT

## 2021-11-20 PROCEDURE — 97130 THER IVNTJ EA ADDL 15 MIN: CPT

## 2021-11-20 PROCEDURE — 97110 THERAPEUTIC EXERCISES: CPT

## 2021-11-20 PROCEDURE — 99232 SBSQ HOSP IP/OBS MODERATE 35: CPT | Performed by: PHYSICAL MEDICINE & REHABILITATION

## 2021-11-20 PROCEDURE — 97535 SELF CARE MNGMENT TRAINING: CPT

## 2021-11-20 PROCEDURE — 1180000000 HC REHAB R&B

## 2021-11-20 PROCEDURE — 6370000000 HC RX 637 (ALT 250 FOR IP): Performed by: STUDENT IN AN ORGANIZED HEALTH CARE EDUCATION/TRAINING PROGRAM

## 2021-11-20 PROCEDURE — 97530 THERAPEUTIC ACTIVITIES: CPT

## 2021-11-20 PROCEDURE — 82947 ASSAY GLUCOSE BLOOD QUANT: CPT

## 2021-11-20 RX ADMIN — INSULIN LISPRO 6 UNITS: 100 INJECTION, SOLUTION INTRAVENOUS; SUBCUTANEOUS at 08:13

## 2021-11-20 RX ADMIN — GABAPENTIN 300 MG: 300 CAPSULE ORAL at 21:23

## 2021-11-20 RX ADMIN — CARVEDILOL 6.25 MG: 6.25 TABLET, FILM COATED ORAL at 08:03

## 2021-11-20 RX ADMIN — TAMSULOSIN HYDROCHLORIDE 0.4 MG: 0.4 CAPSULE ORAL at 08:04

## 2021-11-20 RX ADMIN — DOCUSATE SODIUM 100 MG: 100 CAPSULE ORAL at 21:23

## 2021-11-20 RX ADMIN — DOCUSATE SODIUM 100 MG: 100 CAPSULE ORAL at 08:03

## 2021-11-20 RX ADMIN — POLYETHYLENE GLYCOL 3350 17 G: 17 POWDER, FOR SOLUTION ORAL at 08:04

## 2021-11-20 RX ADMIN — ATORVASTATIN CALCIUM 80 MG: 80 TABLET, FILM COATED ORAL at 08:03

## 2021-11-20 RX ADMIN — PANTOPRAZOLE SODIUM 40 MG: 40 TABLET, DELAYED RELEASE ORAL at 06:19

## 2021-11-20 RX ADMIN — GABAPENTIN 300 MG: 300 CAPSULE ORAL at 08:03

## 2021-11-20 RX ADMIN — RANOLAZINE 1000 MG: 1000 TABLET, FILM COATED, EXTENDED RELEASE ORAL at 21:22

## 2021-11-20 RX ADMIN — INSULIN LISPRO 10 UNITS: 100 INJECTION, SOLUTION INTRAVENOUS; SUBCUTANEOUS at 17:00

## 2021-11-20 RX ADMIN — BUSPIRONE HYDROCHLORIDE 10 MG: 10 TABLET ORAL at 21:23

## 2021-11-20 RX ADMIN — BUSPIRONE HYDROCHLORIDE 10 MG: 10 TABLET ORAL at 08:04

## 2021-11-20 RX ADMIN — FUROSEMIDE 80 MG: 40 TABLET ORAL at 16:56

## 2021-11-20 RX ADMIN — FERROUS SULFATE TAB 325 MG (65 MG ELEMENTAL FE) 325 MG: 325 (65 FE) TAB at 08:03

## 2021-11-20 RX ADMIN — ACETAMINOPHEN 650 MG: 325 TABLET ORAL at 13:03

## 2021-11-20 RX ADMIN — FUROSEMIDE 80 MG: 40 TABLET ORAL at 08:03

## 2021-11-20 RX ADMIN — INSULIN LISPRO 2 UNITS: 100 INJECTION, SOLUTION INTRAVENOUS; SUBCUTANEOUS at 21:26

## 2021-11-20 RX ADMIN — INSULIN LISPRO 10 UNITS: 100 INJECTION, SOLUTION INTRAVENOUS; SUBCUTANEOUS at 12:26

## 2021-11-20 RX ADMIN — INSULIN LISPRO 9 UNITS: 100 INJECTION, SOLUTION INTRAVENOUS; SUBCUTANEOUS at 12:24

## 2021-11-20 RX ADMIN — TRAZODONE HYDROCHLORIDE 75 MG: 50 TABLET ORAL at 21:23

## 2021-11-20 RX ADMIN — ACETAMINOPHEN 650 MG: 325 TABLET ORAL at 06:20

## 2021-11-20 RX ADMIN — ACETAMINOPHEN 650 MG: 325 TABLET ORAL at 21:41

## 2021-11-20 RX ADMIN — FEBUXOSTAT 40 MG: 40 TABLET, FILM COATED ORAL at 08:16

## 2021-11-20 RX ADMIN — INSULIN GLARGINE 73 UNITS: 100 INJECTION, SOLUTION SUBCUTANEOUS at 08:14

## 2021-11-20 RX ADMIN — CARVEDILOL 6.25 MG: 6.25 TABLET, FILM COATED ORAL at 21:23

## 2021-11-20 RX ADMIN — RANOLAZINE 1000 MG: 1000 TABLET, FILM COATED, EXTENDED RELEASE ORAL at 08:05

## 2021-11-20 RX ADMIN — INSULIN LISPRO 6 UNITS: 100 INJECTION, SOLUTION INTRAVENOUS; SUBCUTANEOUS at 16:56

## 2021-11-20 RX ADMIN — INSULIN GLARGINE 73 UNITS: 100 INJECTION, SOLUTION SUBCUTANEOUS at 21:25

## 2021-11-20 RX ADMIN — BUSPIRONE HYDROCHLORIDE 10 MG: 10 TABLET ORAL at 13:04

## 2021-11-20 RX ADMIN — INSULIN LISPRO 10 UNITS: 100 INJECTION, SOLUTION INTRAVENOUS; SUBCUTANEOUS at 08:44

## 2021-11-20 ASSESSMENT — PAIN SCALES - GENERAL
PAINLEVEL_OUTOF10: 5
PAINLEVEL_OUTOF10: 5
PAINLEVEL_OUTOF10: 6
PAINLEVEL_OUTOF10: 4
PAINLEVEL_OUTOF10: 0
PAINLEVEL_OUTOF10: 3
PAINLEVEL_OUTOF10: 7

## 2021-11-20 ASSESSMENT — PAIN DESCRIPTION - ORIENTATION
ORIENTATION: RIGHT;LEFT

## 2021-11-20 ASSESSMENT — PAIN DESCRIPTION - LOCATION
LOCATION: FOOT
LOCATION: LEG;FOOT
LOCATION: LEG;FOOT

## 2021-11-20 ASSESSMENT — PAIN DESCRIPTION - DESCRIPTORS
DESCRIPTORS: TINGLING
DESCRIPTORS: TINGLING

## 2021-11-20 ASSESSMENT — PAIN DESCRIPTION - PAIN TYPE
TYPE: CHRONIC PAIN
TYPE: ACUTE PAIN
TYPE: ACUTE PAIN

## 2021-11-20 NOTE — PROGRESS NOTES
Physical Medicine & Rehabilitation  Progress Note    11/20/2021 2:50 PM     CC: Ambulatory and ADL dysfunction due tor ecrudescence of right-sided and cognitive stroke symptoms    Subjective:   No complaints. Feels well no complaints. ROS:  Denies fevers, chills, sweats. No chest pain, palpitations, lightheadedness. Denies coughing, wheezing or shortness of breath. Denies abdominal pain, nausea, diarrhea or constipation. No new areas of joint pain. Denies new areas of numbness or weakness. Denies new anxiety or depression issues. No new skin problems. Rehabilitation:   PT:  Restrictions/Precautions: Contact Precautions, Fall Risk  Implants present? :  (pt denies)  Other position/activity restrictions: Hemodialysis MWF  Required Braces or Orthoses  Right Lower Extremity Brace:  (lymphedema wraps; not present today)  Left Lower Extremity Brace:  (lymphedema wraps; not present today)   Transfers  Sit to Stand: Stand by assistance  Stand to sit: Stand by assistance  Bed to Chair: Stand by assistance  Stand Pivot Transfers: Stand by assistance  Comment: Rolling walker. Pt demonstrates safe techniques. Ambulation 1  Surface: level tile  Device: Rolling Walker  Other Apparatus: Wheelchair follow  Assistance: Contact guard assistance (Progressing to SBA)  Quality of Gait: Pt demos a decrease steady gait. Minimal R foot clearance of floor but demonstrating fair heel strike. Gait Deviations: Slow Annie, Increased ELISABET, Decreased step length, Decreased step height  Distance: 60'x2  Comments: Pt requires a seated rest break between amb. OT:  ADL  Feeding: Setup  Grooming: Setup  UE Bathing: Setup  LE Bathing: Setup, Supervision  UE Dressing: Setup (SBA with set up from closet)  LE Dressing: Minimal assistance (Assist with donning B edema socks/wraps)  Toileting: None  Additional Comments: SEated/standing sinkside, ADL includes hair washing with shampoo cap and shaving B LE's this date. Balance  Sitting Balance: Independent  Standing Balance: Stand by assistance (supervison-SBA pending task )   Standing Balance  Time: 1-2 min trials  Activity: self care, mobility in room   Comment: with RW, reports prior limited stand ilana  Functional Mobility  Functional - Mobility Device: Rolling Walker  Activity: To/from bathroom, [employer], Transport items  Assist Level: Stand by assistance (supervision-SBA)  Functional Mobility Comments: Verbal cues for hand placement and safety     Bed mobility  Bridging: Contact guard assistance (Writer to assist holding RLE in place. )  Rolling to Left: Supervision  Rolling to Right: Stand by assistance  Supine to Sit: Modified independent  Sit to Supine: Stand by assistance (Assist level reported by Speech Therapist)  Scooting: Minimal assistance (Bracing R foot to scoot HOB; Pt using rails, bridging)  Comment: Pt performs bed mobility with HOB slightly elevated and minimal use of bed rails. Transfers  Stand Step Transfers: Supervision  Stand Pivot Transfers: Supervision  Sit to stand: Supervision  Stand to sit: Supervision  Transfer Comments: Verbal cues for hand placement and safety   Toilet Transfers  Toilet - Technique: Ambulating  Equipment Used: Standard toilet (with bilateral toilet rails. )  Toilet Transfer: Supervision  Toilet Transfers Comments: RW     Shower Transfers  Shower - Transfer To: Shower seat with back  Shower - Technique: Ambulating  Shower Transfers: Minimal assistance  Shower Transfers Comments: OT assisted pt in backing up to shower with RW and utilizing grab bars to assist with back steping over threshold and sitting on shower chair. Pt demonstrated good safety with increased time to complete shower transfer. ST:            Objective:  BP (!) 129/59   Pulse 70   Temp 97.9 °F (36.6 °C)   Resp 19   Ht 5' 5\" (1.651 m)   Wt 242 lb 15.2 oz (110.2 kg)   SpO2 99%   BMI 40.43 kg/m²  I Body mass index is 40.43 kg/m².  I   Wt Readings from Last 1 Encounters:   21 242 lb 15.2 oz (110.2 kg)      Temp (24hrs), Av.2 °F (36.8 °C), Min:97.9 °F (36.6 °C), Max:98.4 °F (36.9 °C)         GEN: well developed, well nourished, no acute distress  HEENT: Normocephalic atraumatic, EOMI, mucous membranes pink and moist  CV: RRR, no murmurs, rubs or gallops  PULM: CTAB, no rales or rhonchi. Respirations WNL and unlabored  ABD: soft, NT, ND, +BS and equal  NEURO: A&O x3. Sensation intact to light touch. MSK: 4+5 upper and lower extremities  EXTREMITIES: No calf tenderness to palpation bilaterally. Lymphedema bilateral lower extremities  SKIN: warm dry and intact with good turgor right medial great toe darkened area half by half centimeter, no erythema  PSYCH: appropriately interactive. Affect WNL.   Good spirits        Medications   Scheduled Meds:   polyethylene glycol  17 g Oral Daily    insulin glargine  73 Units SubCUTAneous BID    busPIRone  10 mg Oral TID    insulin lispro  10 Units SubCUTAneous TID WC    [Held by provider] apixaban  5 mg Oral BID    [Held by provider] aspirin  81 mg Oral Daily    atorvastatin  80 mg Oral Daily    carvedilol  6.25 mg Oral BID    docusate sodium  100 mg Oral BID    febuxostat  40 mg Oral Daily    ferrous sulfate  325 mg Oral BID WC    furosemide  80 mg Oral BID    gabapentin  300 mg Oral BID    insulin lispro  0-18 Units SubCUTAneous TID WC    insulin lispro  0-9 Units SubCUTAneous Nightly    pantoprazole  40 mg Oral QAM AC    ranolazine  1,000 mg Oral BID    tamsulosin  0.4 mg Oral Daily    traZODone  75 mg Oral Nightly     Continuous Infusions:   sodium chloride      dextrose       PRN Meds:.sodium citrate **AND** sodium citrate, anticoagulant sodium citrate **AND** anticoagulant sodium citrate, sodium chloride, ondansetron **OR** [DISCONTINUED] ondansetron, dextrose, dextrose, glucagon (rDNA), glucose, lactulose, sodium chloride flush, acetaminophen, senna, bisacodyl     Diagnostics: CBC: No results for input(s): WBC, RBC, HGB, HCT, MCV, RDW, PLT in the last 72 hours. BMP:   Recent Labs     11/19/21  0628      K 4.3      CO2 27   BUN 41*   CREATININE 2.46*     BNP: No results for input(s): BNP in the last 72 hours. PT/INR: No results for input(s): PROTIME, INR in the last 72 hours. APTT: No results for input(s): APTT in the last 72 hours. CARDIAC ENZYMES: No results for input(s): CKMB, CKMBINDEX, TROPONINT in the last 72 hours. Invalid input(s): CKTOTAL;3  FASTING LIPID PANEL:  Lab Results   Component Value Date    CHOL 105 04/09/2015    HDL 43 04/09/2015    TRIG 168 04/09/2015     LIVER PROFILE: No results for input(s): AST, ALT, ALB, BILIDIR, BILITOT, ALKPHOS in the last 72 hours. I/O (24Hr): Intake/Output Summary (Last 24 hours) at 11/20/2021 1450  Last data filed at 11/19/2021 1756  Gross per 24 hour   Intake --   Output 2500 ml   Net -2500 ml       Glu last 24 hour  Recent Labs     11/19/21  1822 11/19/21  2130 11/20/21  0627 11/20/21  1027   POCGLU 146* 243* 213* 298*       No results for input(s): CLARITYU, COLORU, PHUR, SPECGRAV, PROTEINU, RBCUA, BLOODU, BACTERIA, NITRU, WBCUA, LEUKOCYTESUR, YEAST, Reinaldo Coby in the last 72 hours. X-ray left foot 11/19  Impression:     1. Diffuse soft tissue swelling along the left foot without evidence of an   acute fracture or osteomyelitis. Impression/Plan:    1. Recrudescence of right-sided and cognitive stroke symptoms:  PT/OT for gait, mobility, strengthening, endurance, ADLs, and self care.  SLP for cognition. 2. CVA-on aspirin (on hold as below), eliquis (on hold as below), and atorvastatin.  Follow up with neurology. 3. Anemia:  Hemoglobin 11.7 on 11/17, stable.  Monitoring.  On iron supplementation. 4. Eschar on left great toe:  Wound care following.   Podiatry consulted by IM - ordered x-ray of the left foot-as above, podiatry evaluation appreciated-nonsurgical, can follow-up as outpatient. 5. AURELIA on CKD:  On hemodialysis MWF.  Nephrology following.  She states that she is scheduled for AV fistula placement on 11/23/21 at 12:30pm (day of discharge) - will plan to discharge at 11am that day so that she can have procedure done. Eliquis and aspirin on hold for procedure per instructions patient was given. NPO at midnight on 11/23. -Will need clarification on medications i.e. insulin ( on 73 units twice a day of insulin), etc.-What is to be held or administered. Clarify with surgeon and family practice Dr. Craig Gaona  6. CHF, HTN:  On carvedilol, furosemide  7. Lymphedema bilateral lower extremity-has stockings  8. Type 2 diabetes with neuropathy:  On lantus BID - IM increased dose on 11/18, humalog TID + sliding scale.  Has gabapentin BID  9. Insomnia:  On trazodone nightly  10. Adjustment disorder with anxiety:  Psychiatry consulted in acute care - recommended titration of buspar to 10mg TID, discussed with patient and increased the dose 11/17; also recommended consideration of an acetylcholinesterase inhibitor - will hold off for now, as patient is doing well with speech therapy and utilizing strategies to help with memory. Recommend outpatient counseling. 11. Gout:  On febuxostat  12. Obesity:  BMI 41.02  13. Bowel Management: Docusate BID, miralax daily, senokot prn, dulcolax prn. 14. DVT Prophylaxis:  On eliquis (on hold as above)  15. Internal Medicine for medical management   16. Discharge plan 11/22 AM if okay with family practice, patient to go for AV fistula-n.p.o. previous night, clarify medication/insulin prior to procedure with surgeon/family practice, follow-up with 1. PCP Leelee KILLIAN 2. Nephrology 3. Surgeon 4. Rehab, 5. Podiatry, 6. Psychiatry/counseling questionable home versus outpatient PT/OT, questionable Eliquis new, patient resume aspirin/Eliquis when okay with surgeon      Meir Renae. Julissa Love MD       This note is created with the assistance of a speech recognition program.  While intending to generate a document that actually reflects the content of the visit, the document can still have some errors including those of syntax and sound a like substitutions which may escape proof reading.   In such instances, actual meaning can be extrapolated by contextual diversion

## 2021-11-20 NOTE — PROGRESS NOTES
HEMODIALYSIS POST TREATMENT NOTE    Treatment time ordered: 210 minutes    Actual treatment time: 210 minutes    UltraFiltration Goal: 2000 ml  UltraFiltration Removed: 2000 ml      Pre Treatment weight: 112.4KG  Post Treatment weight: 110.2KG  Estimated Dry Weight:     Access used:     Central Venous Catheter:          Tunneled            Site: Rt Tunnel          Access Flow: good      Internal Access:       AV Fistula  States having creation of AVF lt arm nest 21       Sign and symptoms of infection: no           Medications or blood products given: No    [facility] outpatient [facility]: [facility]    Chronic outpatient unit: [facility]    Summary of response to treatment: Tolerated well    Explain if orders NOT met, was physician notified      ACES flowsheet faxed to patient unit/ placed in patient chart: N/A Epic charting    Post assessment completed: Yes    Report given to: Jerrald Kawasaki RN      * Intra-treatment documented Safety Checks include the followin) Access and face visible at all times. 2) All connections and blood lines are secure with no kinks. 3) NVL alarm engaged. 4) Hemosafe device applied (if applicable). 5) No collapse of Arterial or Venous blood chambers. 6) All blood lines / pump segments in the air detectors.

## 2021-11-20 NOTE — PROGRESS NOTES
Speech Language Pathology  Speech Language Pathology  [facility] Acute Rehab Unit at [facility]    Cognitive Treatment Note    Date: 11/20/2021  Patients Name: Lyndsay Archer  MRN: [id number]  Diagnosis:   Patient Active Problem List   Diagnosis Code    Atherosclerosis of coronary artery bypass graft of native heart without angina pectoris I25.810    Acute renal failure ([facility] [address] N17.9    Diabetes mellitus due to underlying condition with diabetic nephropathy, with long-term current use of insulin ([facility]) E08.21, Z79.4    Chronic diastolic heart failure ([facility]) I50.32    Diabetic polyneuropathy associated with type 2 diabetes mellitus ([facility]) E11.42    History of coronary artery bypass graft Z95.1    Iron deficiency anemia D50.9    Spinal stenosis of lumbar region with neurogenic claudication M48.062    Mixed hyperlipidemia E78.2    Stage 4 chronic kidney disease (HCC) N18.4    Type 2 diabetes mellitus with kidney complication, with long-term current use of insulin ([facility]) E11.29, Z79.4    Syncope and collapse R55    Obesity, Class II, BMI 35-39.9 E66.9    Thyroid nodule greater than or equal to 1 cm in diameter incidentally noted on imaging study E04.1    Essential hypertension I10    Anemia in stage 4 chronic kidney disease (HCC) N18.4, D63.1    Chronic ischemic heart disease I25.9    Ischemic stroke of frontal lobe ([facility]) I63.9    Stroke-like symptoms R29.90    Morbid obesity with BMI of 40.0-44.9, adult ([facility]) E66.01, Z68.41    Acute encephalopathy G93.40    Disequilibrium syndrome E87.8    Debility R53.81    Long-term memory loss R41.3    Muscle right arm weakness M62.81    Anxiety F41.9    Chronic midline low back pain with bilateral sciatica M54.41, M54.42, G89.29    Need for immunization against influenza Z23    Altered mental status R41.82       Pain: 0/10    Cognitive Treatment  *Doctor rounding upon arrival    Treatment time: 1170-9736      Subjective: [x] Alert [x] Cooperative     [] Confused     [] Agitated    [] Lethargic  Objective/Assessment:  Attention: functional     Orientation: x4 anthony    Recall:     Paragraph retention: immediate recall 80% accuracy anthony   With delay: 100% accuracy anthony     Significant pt ed provided during session re use of internal memory aide (mneumonics). Pt practicing utilization of strategy in the following task:    5 unit word list recall: 80% accuracy anthony, no increase with max cues provided. Pt verbalized understanding of strategy but demonstrating difficulty implementing strategy into task. Organization: NA    Problem Solving/Reasoning:   Did not treat    Other:  Writing: Pt spelling target words in isolation with 83% accuracy         Plan:  [x] Continue ST services    [] Discharge from ST:      Discharge recommendations: []  Further therapy recommended at discharge. The patient should be able to tolerate at least 3 hours of therapy per day over 5 days or 15 hours over 7 days. [] Further therapy recommended at discharge. [] No therapy recommended at discharge. Treatment completed by: Krystal SPRINGER A.CCC/SLP

## 2021-11-20 NOTE — PROGRESS NOTES
FAMILY MEDICINE  - PROGRESS NOTE    Date:  11/20/2021  Andrew Duribn  [id number]      Chief complaint: Debility      Interval History:  Improved, she is doing well this am. She is scheduled for a fistula placement on 11/23. No significant events overnight. Specialists notes, labs & imaging reviewed.       Subjective  Constitutional: positive for obesity  Musculoskeletal:positive for arthralgias, back pain and myalgias  Neurological: positive for memory problems  Behavioral/Psych: positive for anxiety  Endocrine: positive for diabetic symptoms including hyperglycemia:    Objective:    BP (!) 129/59   Pulse 70   Temp 97.9 °F (36.6 °C)   Resp 19   Ht 5' 5\" (1.651 m)   Wt 242 lb 15.2 oz (110.2 kg)   SpO2 99%   BMI 40.43 kg/m²   General appearance - alert, well appearing, and in no distress and overweight  Mental status - alert, oriented to person, place, and time  Eyes - pupils equal and reactive, extraocular eye movements intact  Ears - hearing grossly normal bilaterally  Nose - normal and patent, no erythema, discharge or polyps  Mouth - mucous membranes moist, pharynx normal without lesions  Neck - supple, no significant adenopathy  Lymphatics - no palpable lymphadenopathy, no hepatosplenomegaly  Chest - clear to auscultation, no wheezes, rales or rhonchi, symmetric air entry  Heart - normal rate, regular rhythm, normal S1, S2, no murmurs, rubs, clicks or gallops  Abdomen - soft, nontender, nondistended, no masses or organomegaly  Breasts - not examined  Back exam - not examined  Neurological - alert, oriented, normal speech, no focal findings or movement disorder noted  Musculoskeletal - osteoarthritic changes noted in both hands  Extremities - peripheral pulses normal, no pedal edema, no clubbing or cyanosis  Skin - normal coloration and turgor, no rashes, no suspicious skin lesions noted    Data:   Medications:   Current Facility-Administered Medications Medication Dose Route Frequency Provider Last Rate Last Admin    sodium citrate 4 % injection 1.9 mL  1.9 mL IntraCATHeter PRN Althea Briggs MD   1.9 mL at 11/19/21 1812    And    sodium citrate 4 % injection 1.9 mL  1.9 mL IntraCATHeter PRN Enrico Dixon MD   1.9 mL at 11/19/21 1812    polyethylene glycol (GLYCOLAX) packet 17 g  17 g Oral Daily Hailey Tse MD   17 g at 11/20/21 0804    insulin glargine (LANTUS) injection vial 73 Units  73 Units SubCUTAneous BID Rama Mazariegos MD   73 Units at 11/19/21 2157    busPIRone (BUSPAR) tablet 10 mg  10 mg Oral TID Hailey Tse MD   10 mg at 11/20/21 0804    anticoagulant sodium citrate 4 % injection 1.9 mL  1.9 mL IntraCATHeter PRN Enrico Dixon MD   1.9 mL at 11/17/21 1850    And    anticoagulant sodium citrate 4 % injection 1.9 mL  1.9 mL IntraCATHeter PRN Enrico Dixon MD   1.9 mL at 11/17/21 1850    insulin lispro (HUMALOG) injection vial 10 Units  10 Units SubCUTAneous TID  Andreas Oglesby MD   10 Units at 11/19/21 1829    0.9 % sodium chloride infusion  25 mL IntraVENous PRN Nuvia Cary MD        ondansetron (ZOFRAN-ODT) disintegrating tablet 4 mg  4 mg Oral Q8H PRN Nuvia Cary MD        dextrose 5 % solution  100 mL/hr IntraVENous PRN Nuvia Cary MD        dextrose 50 % IV solution  12.5 g IntraVENous PRN Nuvia Cary MD        glucagon (rDNA) injection 1 mg  1 mg IntraMUSCular PRN Nuvia Cary MD        glucose (GLUTOSE) 40 % oral gel 15 g  15 g Oral PRN Nuvia Cary MD        [Held by provider] apixaban [facility]) tablet 5 mg  5 mg Oral BID Nuvia Cary MD   5 mg at 11/18/21 2029    [Held by provider] aspirin chewable tablet 81 mg  81 mg Oral Daily Nuvia Cary MD   81 mg at 11/18/21 0840    atorvastatin (LIPITOR) tablet 80 mg  80 mg Oral Daily Nuvia Cary MD   80 mg at 11/20/21 0803    carvedilol (COREG) tablet 6.25 mg  6.25 mg Oral BID Paul Holman Shai Jhaveri MD   6.25 mg at 11/20/21 0803    docusate sodium (COLACE) capsule 100 mg  100 mg Oral BID Kirsten Eli MD   100 mg at 11/20/21 0803    febuxostat (ULORIC) tablet 40 mg  40 mg Oral Daily Kirsten Eli MD   40 mg at 11/19/21 3581    ferrous sulfate (IRON 325) tablet 325 mg  325 mg Oral BID  Kirsten Eli MD   325 mg at 11/20/21 0803    furosemide (LASIX) tablet 80 mg  80 mg Oral BID Kirsten Eli MD   80 mg at 11/20/21 0803    gabapentin (NEURONTIN) capsule 300 mg  300 mg Oral BID Kirsten Eli MD   300 mg at 11/20/21 0803    insulin lispro (HUMALOG) injection vial 0-18 Units  0-18 Units SubCUTAneous TID WC Kirsten Eli MD   3 Units at 11/19/21 1828    insulin lispro (HUMALOG) injection vial 0-9 Units  0-9 Units SubCUTAneous Nightly Kirsten Eli MD   3 Units at 11/19/21 2156    lactulose (CHRONULAC) 10 GM/15ML solution 20 g  20 g Oral TID PRN Kirsten Eli MD        pantoprazole (PROTONIX) tablet 40 mg  40 mg Oral QAM AC Kirsten Eli MD   40 mg at 11/20/21 9844    ranolazine (RANEXA) extended release tablet 1,000 mg  1,000 mg Oral BID Kirsten Eli MD   1,000 mg at 11/20/21 0805    sodium chloride flush 0.9 % injection 10 mL  10 mL IntraVENous PRN Kirsten Eli MD        tamsulosin [facility]) capsule 0.4 mg  0.4 mg Oral Daily Kirsten lEi MD   0.4 mg at 11/20/21 0804    traZODone (DESYREL) tablet 75 mg  75 mg Oral Nightly Kirsten Eli MD   75 mg at 11/19/21 2155    acetaminophen (TYLENOL) tablet 650 mg  650 mg Oral Q4H PRN Juliana Jacobs MD   650 mg at 11/20/21 6572    senna (SENOKOT) tablet 17.2 mg  2 tablet Oral Daily PRN Juliana Jacobs MD        bisacodyl (DULCOLAX) suppository 10 mg  10 mg Rectal Daily PRN Juliana Jacobs MD           Intake/Output Summary (Last 24 hours) at 11/20/2021 0807  Last data filed at 11/19/2021 1756  Gross per 24 hour   Intake --   Output 2500 ml   Net -2500 ml     Recent Results (from the past 24 hour(s))   POC Glucose Fingerstick    Collection Time: 11/19/21 11:09 AM   Result Value Ref Range    POC Glucose 247 (H) 65 - 105 mg/dL   POC Glucose Fingerstick    Collection Time: 11/19/21  6:22 PM   Result Value Ref Range    POC Glucose 146 (H) 65 - 105 mg/dL   POC Glucose Fingerstick    Collection Time: 11/19/21  9:30 PM   Result Value Ref Range    POC Glucose 243 (H) 65 - 105 mg/dL   POC Glucose Fingerstick    Collection Time: 11/20/21  6:27 AM   Result Value Ref Range    POC Glucose 213 (H) 65 - 105 mg/dL     -----------------------------------------------------------------  RAD:  EKG:  Micro:     Assessment & Plan:    Patient Active Problem List:     Atherosclerosis of coronary artery bypass graft of native heart without angina pectoris     Acute renal failure ([facility])     Diabetes mellitus due to underlying condition with diabetic nephropathy, with long-term current use of insulin ([facility])     Chronic diastolic heart failure ([facility])     Diabetic polyneuropathy associated with type 2 diabetes mellitus ([facility] [address]     History of coronary artery bypass graft     Iron deficiency anemia     Spinal stenosis of lumbar region with neurogenic claudication     Mixed hyperlipidemia     Stage 4 chronic kidney disease ([facility])     Type 2 diabetes mellitus with kidney complication, with long-term current use of insulin ([facility])     Syncope and collapse     Obesity, Class II, BMI 35-39.9     Thyroid nodule greater than or equal to 1 cm in diameter incidentally noted on imaging study     Essential hypertension     Anemia in stage 4 chronic kidney disease ([facility])     Chronic ischemic heart disease     Ischemic stroke of frontal lobe ([facility])     Stroke-like symptoms     Morbid obesity with BMI of 40.0-44.9, adult ([facility])     Acute encephalopathy     Disequilibrium syndrome     Debility     Long-term memory loss     Muscle right arm weakness     Anxiety     Chronic midline low back pain with bilateral sciatica     Need for immunization against influenza     Altered mental status           Plan:  -Recrudescence of right sided weakness and cognitive stroke symptoms - PT/OT/Speech treating. -CVA - on ASA and Eliquis, on hold for surgery Tuesday. -AURELIA on CKD - on dialysis M,W,F, fistula to be placed on 11/23, insulin to be held starting the morning of 11/23, ASA & Eliquis already on hold, pt should be NPO with sips of meds and take carvedilol Tues am.  -Discharge plan is Tues am so that patient can have her fistula placement. I am in agreement with this.  -Continue current treatments.  -Complete orders per chart.     See orders   Disposition:    Electronically signed by Kirsten Eli MD on 11/20/2021 at 8:07 AM

## 2021-11-20 NOTE — PLAN OF CARE
The podiatry team was consulted for a soft tissue lesion. Upon chart & imaging review, the lesion is deemed to be stable and not grossly infected. No acute surgical intervention is required. Podiatry to sign off at this time. This patient can be followed on an outpatient basis in the podiatry clinic. Follow up instructions have been placed in the patient's chart. Thank you for the consult. Please [facility] podiatry resident on call with any questions or concerns.     Electronically signed by Caesar Stover DPM on 11/20/2021 at 9:44 AM

## 2021-11-20 NOTE — PROGRESS NOTES
Physical Therapy  Facility/Department: [facility] MED SURG  Daily Treatment Note  NAME: Vanessa Huang  : 1960  MRN: [id number]    Date of Service: 2021    Discharge Recommendations:  Patient would benefit from continued therapy after discharge   PT Equipment Recommendations  Equipment Needed:  (TBD)    Assessment   Body structures, Functions, Activity limitations: Decreased functional mobility ; Decreased strength; Decreased cognition; Decreased endurance; Decreased sensation; Decreased balance; Decreased coordination; Decreased posture; Increased pain; Decreased ROM  Assessment:  Pt with recent left frontal stroke with residual mild right-sided weakness, pt is a Hemodialysis pt. Presents with impaired cognition, increased Right side weakness and sensation deficits. Pt requiring SBA for bed mobility, and CGA for transfers and short distance gait 30-40' on both level tile and carpeting. pt is motivated and has good home support. pt would benefit from continued PT services to address deficits of strength, coordination, balance, posture, and overall functional mobility to allow for a safe return home. Treatment Diagnosis: impaired mobility 2* CVA  Specific instructions for Next Treatment: 1 Step negotiation, RLE strengthening, dynamic balance training, Endurance training  Prognosis: Good  Decision Making: Medium Complexity  History: Hemodialysis pt, Recent CVA, Chronic back/neck pain. Exam: ROM, MMT, Balance and functional mobility assessments. PASS assessment  Clinical Presentation: pt is pleasant and cooperative throughout.    Barriers to Learning: cognition/memory deficits  REQUIRES PT FOLLOW UP: Yes  Activity Tolerance  Activity Tolerance: Patient limited by fatigue; Patient limited by endurance  Activity Tolerance: Pt requires multiple seated rest breaks as well as increased time to complete tasks d/t increased fatigue (pt reported that she usually uses  to recover from her M/W/F hemodialysis)  Other Comments  Comments: Treatment cut short d/t transport arriving to take patient to MRI      Patient Diagnosis(es): There were no encounter diagnoses. has a past medical history of Backache, unspecified, CHF (congestive heart failure) ([address] Chronic kidney disease, Coronary atherosclerosis of artery bypass graft, Cramp of limb, Gallstones, Hypertension, Insomnia, Pneumonia, Type II or unspecified type diabetes mellitus with renal manifestations, not stated as uncontrolled(250.40), Type II or unspecified type diabetes mellitus without mention of complication, not stated as uncontrolled, and Unspecified vitamin D deficiency. has a past surgical history that includes Coronary artery bypass graft; Knee arthroscopy; Carpal tunnel release; Breast surgery; Tonsillectomy; Hand surgery; Ankle fracture surgery; Cholecystectomy, open (N/A); and IR TUNNELED CVC PLACE WO SQ PORT/PUMP > 5 YEARS (8/18/2021). Restrictions  Restrictions/Precautions  Restrictions/Precautions: Contact Precautions, Fall Risk  Other position/activity restrictions: Hemodialysis MWF     Subjective   General  Chart Reviewed: Yes  Additional Pertinent Hx: This is a 59 y.o. female with a significant past medical history of Chronic atrial fibrillation on Eliquis, Type 2 diabetes mellitus, essential systemic hypertension, coronary artery disease s/p CABG in 2004 and PTCA in 2019, heart failure with preserved ejection fraction LVEF 55% and end-stage renal disease secondary to diabetic and hypertensive nephropathy on routine hemodialysis on a Monday/Wednesday/Friday schedule at [address] [place] [address] dialysis unit on [facility] under the care of Dr. Mercy Burnette since August 2001 using IJ tunneled dialysis catheter, who presented to the hospital for further evaluation of acute on chronic right-sided weakness, Slurred speech and confusion.   She was initially diagnosed with acute/subacute stroke in the left frontal lobe with right-sided weakness 4 months ago. Neurology consulted , MRI brain pending. Response To Previous Treatment: Patient with no complaints from previous session. Family / Caregiver Present: No  Referring Practitioner: Dr. Fabian Gardner: Pt reported being more fatigued and sleepy today, d/t hemodialysis yesterday. Pain Screening  Patient Currently in Pain: Yes  Pain Assessment  Pain Assessment: 0-10  Pain Level: 6  Pain Type: Acute pain  Pain Location: Leg; Foot  Pain Orientation: Right; Left  Non-Pharmaceutical Pain Intervention(s): Repositioned; Rest; Ambulation/Increased Activity; Distraction  Response to Pain Intervention: Patient Satisfied  Vital Signs  Patient Currently in Pain: Yes  Oxygen Therapy  O2 Device: None (Room air)  Patient Observation  Observations: calm and cooperative       Orientation  Orientation  Overall Orientation Status: Within Functional Limits    Objective      Transfers  Sit to Stand: Stand by assistance  Stand to sit: Stand by assistance  Bed to Chair: Stand by assistance  Stand Pivot Transfers: Stand by assistance  Comment: Rolling walker. Pt demonstrates safe techniques. Ambulation  Ambulation?: Yes  More Ambulation?: Yes  Ambulation 1  Surface: level tile  Device: Rolling Walker  Other Apparatus: Wheelchair follow  Assistance: Contact guard assistance; Stand by assistance (CGA progressing to SBA)  Gait Deviations: Slow Annie; Increased ELISABET; Decreased step length; Decreased step height  Distance: 35ft, 45ft     Balance  Posture: Good  Sitting - Static: Good (Edge of bed, no back or UE support)  Sitting - Dynamic: Fair; + (Edge of bed, no back  support, B UE support)  Standing - Static: Fair; + (rolling walker)  Standing - Dynamic: Fair (rolling walker)  Comments: Sitting balance assessed with back support. Standing balance assessed with RW/BUE support.    Other exercises  Other exercises?: Yes  Other exercises 1: Seated bilateral LE exercises, x15, R LE 1#, L LE 2#, lime (minimal+) resistance band     (in AM and PM)  Other exercises 3: Sit <> stand x8  Other exercises 5: NuStep: 12 min, workload 2 (seat: 9 ; hands: 11)  Other exercises 8: Standing BLE ex's in // bars. Reps: 10-15 each. Other exercises 9: UBE: 6 min FWD, 6 min BWD         Comment: rest breaks PRN.        Goals  Short term goals  Time Frame for Short term goals: 5 days  Short term goal 1: pt to demo all bed mobility with supervision to decrease burden of care  Short term goal 2: pt to demo all transfers with SBA using RW  Short term goal 3: pt to amb 48' with 2 turns using RW and SBA  Short term goal 4: pt to negotiate at least 1 step with UE support and SBA to allow for safe access into home shower  Short term goal 5: Improve R LE strengthto 2 to 2+/5 to improve fucntion  Long term goals  Time Frame for Long term goals : Until D/C  Long term goal 1: pt to demo all bed mobility independently to decrease burden of care  Long term goal 2: pt to demo all transfers with Mod I with RW  Long term goal 3: pt to amb 125' with RW on even surfaces and at least 10' uneven ramped surfaces with RW demonstrating Mod I   Long term goal 4: pt to demo improved RLE strength by 1/2 manual muscle grade to improve safety with functional mobility   Long term goal 5: pt to improve dynamic standing balance to fair + to improve safety with functional mobility   Long term goal 6: pt to improve PASS score to 30/36 to demo improved balance and safety with mobility   Patient Goals   Patient goals : to get stronger    Plan    Plan  Times per week: 900 minutes for combined PT/OT/ST therapies 2* HemoDialysis  Plan weeks: 7-10 days  Specific instructions for Next Treatment: 1 Step negotiation, RLE strengthening, dynamic balance training, Endurance training  Current Treatment Recommendations: Strengthening, Balance Training, Functional Mobility Training, Transfer Training, Endurance Training, Gait Training, Home Exercise Program, Safety Education & Training, Patient/Caregiver Education & Training  Safety Devices  Type of devices: Call light within reach, Gait belt, Patient at risk for falls, All fall risk precautions in place, Left in chair     Therapy Time     11/20/21 1104 11/20/21 1433   PT Individual Minutes   Time In 1104 1433   Time Out 1202 1506   Minutes 62 [id number] [facility] Emily Flood, [facility]

## 2021-11-20 NOTE — PLAN OF CARE
Problem: Skin Integrity:  Goal: Will show no infection signs and symptoms  Outcome: Ongoing     Problem: Falls - Risk of:  Goal: Will remain free from falls  Outcome: Ongoing     Problem: Serum Glucose Level - Abnormal:  Goal: Ability to maintain appropriate glucose levels will improve  Outcome: Ongoing    Problem: Pain:  Goal: Pain level will decrease  Outcome: Ongoing

## 2021-11-20 NOTE — PROGRESS NOTES
[address] [facility]    ACUTE REHABILITATION OCCUPATIONAL THERAPY  DAILY NOTE    Date: 21  Patient Name: Altagracia Palafox      Room: 4808/2936-68    MRN: [id number]   : 1960  (59 y.o.)  Gender: female   Referring Practitioner: Austin Rangel MD  Diagnosis: Stroke-Like Symptoms  Additional Pertinent Hx: This is a 59 y.o. female with a significant past medical history of Chronic atrial fibrillation on Eliquis, Type 2 diabetes mellitus, essential systemic hypertension, coronary artery disease s/p CABG in 2004 and PTCA in 2019, heart failure with preserved ejection fraction LVEF 55% and end-stage renal disease secondary to diabetic and hypertensive nephropathy on routine hemodialysis on a Monday/Wednesday/Friday schedule at [address] dialysis unit on [facility] under the care of Dr. Mela Fox since 2001 using IJ tunneled dialysis catheter, who presented to the hospital for further evaluation of acute on chronic right-sided weakness, Slurred speech and confusion. She was initially diagnosed with acute/subacute stroke in the left frontal lobe with right-sided weakness 4 months ago. Neurology consulted , MRI brain pending. Restrictions  Restrictions/Precautions: Contact Precautions, Fall Risk  Implants present? :  (pt denies)  Other position/activity restrictions: Hemodialysis MWF  Required Braces or Orthoses?: Yes    Subjective  Subjective: \"I could just lay in this bed all day\" Pt reports feeling very fatigue however willing to participate as tolerated. \"This is therapy in itself\" pt reports during seated self care tasks; primarily during BLE shaving task. Comments: Pt pleasant and motivated. Pt very fatigued in PM; difficulty to stay awake for therapy session. Patient Currently in Pain: Yes  Pain Level: 5  Pain Location: Leg; Foot  Pain Orientation: Right; Left  Restrictions/Precautions: Contact Precautions;  Fall Risk  Overall Orientation Status: Impaired  Orientation Level: Oriented X4 (REquires use of orientation board for date)     Pain Assessment  Pain Assessment: 0-10  Pain Level: 5  Pain Type: Acute pain  Pain Location: Leg, Foot  Pain Orientation: Right, Left  Pain Descriptors: Tingling    Objective  Cognition  Overall Cognitive Status: WFL  Perception  Overall Perceptual Status: WFL  Balance  Sitting Balance: Independent  Standing Balance: Stand by assistance (supervison-SBA pending task )  Bed mobility  Supine to Sit: Modified independent  Transfers  Sit to stand: Supervision  Stand to sit: Supervision  Standing Balance  Time: 1-2 min trials  Activity: self care, mobility in room   Functional Mobility  Functional - Mobility Device: Rolling Walker  Activity: To/from bathroom; Retrieve items; Transport items  Assist Level: Stand by assistance (supervision-SBA)     Type of ROM/Therapeutic Exercise  Type of ROM/Therapeutic Exercise: Free weights (2#, x20 reps)  Comment: Pt engaged in BUE seated ex's to address overall strength and endurance as well as [employer] skills. (increased cueing due to fatigue this date. )  Exercises  Scapular Protraction: x  Scapular Retraction: x  Shoulder Flexion: x  Shoulder Extension: x  Horizontal ABduction: x  Horizontal ADduction: x  Elbow Flexion: x  Elbow Extension: x  Wrist Flexion: x  Wrist Extension: x        ADL  Feeding: Setup  Grooming: Setup  UE Bathing: Setup  LE Bathing: Setup; Supervision  UE Dressing: Setup (SBA with set up from closet)  LE Dressing: Minimal assistance (Assist with donning B edema socks/wraps)  Toileting: None  Additional Comments: SEated/standing sinkside, ADL includes hair washing with shampoo cap and shaving B LE's this date. Assessment  Performance deficits / Impairments: Decreased ADL status; Decreased functional mobility ;  Decreased strength; Decreased safe awareness; Decreased cognition; Decreased endurance; Decreased sensation; Decreased high-level IADLs; Decreased coordination  Discharge Recommendations: Patient would benefit from continued therapy after discharge  Activity Tolerance: Patient Tolerated treatment well; Patient limited by fatigue  Safety Devices in place: Yes  Type of devices: Call light within reach; Left in chair  Equipment Recommendations  Equipment Needed: Yes        Plan  Plan  Times per week: 900 minutes combined between OT/PT/SLP   Times per day: Twice a day  Current Treatment Recommendations: Self-Care / ADL, Strengthening, ROM, Balance Training, Functional Mobility Training, Endurance Training, Cognitive Reorientation, Safety Education & Training, Patient/Caregiver Education & Training, Equipment Evaluation, Education, & procurement, Home Management Training, Cognitive/Perceptual Training  Patient Goals   Patient goals : \"I would like to get my hand and right arm going. My memory back. Just being able to walk around and get my right leg going\"  Short term goals  Time Frame for Short term goals: By 4-5 days  Short term goal 1: Pt will complete lower body dressing/bathing with CGA and good safety with use of AE as needed  Short term goal 2: Pt will complete upper body dressing with set-up assistance and good safety/attention to task  Short term goal 3: Pt will complete functional transfers/mobility during self care tasks with SBA and good safety with use of least restrictive device  Short term goal 4: Pt will tolerate standing 5+ minutes during functional activity of choice with good safety  Short term goal 5: Pt will participate in 30+ minutes of therapeutic exercises/functional activities to increase safety and independence with self care and mobility  Long term goals  Time Frame for Long term goals : By discharge  Long term goal 1: Pt will complete BADLs with modified independence and good safety with use of AE as needed.    Long term goal 2: Pt will complete functional transfers/mobility during self care tasks with modified independence and good safety with use of least restrictive device  Long term goal 3: Pt will tolerate standing 10+ minutes during functional activity of choice with good safety  Long term goal 4: Pt will verbalize/demonstrate good understanding of home safety/fall prevention strategies to increase safety and independence with self care and mobiliyt  Long term goal 5: Pt will complete simple meal prep/light house keeping task with supervision and good safety         11/20/21 1012 11/20/21 1400   OT Individual Minutes   Time In [id number]   Time Out 0952 1354   Minutes 55 20     Electronically signed by DAYANA Payan on 11/20/21 at 3:53 PM EST

## 2021-11-21 LAB
ANION GAP SERPL CALCULATED.3IONS-SCNC: 12 MMOL/L (ref 9–17)
BUN BLDV-MCNC: 45 MG/DL (ref 8–23)
BUN/CREAT BLD: ABNORMAL (ref 9–20)
CALCIUM SERPL-MCNC: 9.5 MG/DL (ref 8.6–10.4)
CHLORIDE BLD-SCNC: 100 MMOL/L (ref 98–107)
CO2: 26 MMOL/L (ref 20–31)
CREAT SERPL-MCNC: 3.13 MG/DL (ref 0.5–0.9)
GFR AFRICAN AMERICAN: 18 ML/MIN
GFR NON-AFRICAN AMERICAN: 15 ML/MIN
GFR SERPL CREATININE-BSD FRML MDRD: ABNORMAL ML/MIN/{1.73_M2}
GFR SERPL CREATININE-BSD FRML MDRD: ABNORMAL ML/MIN/{1.73_M2}
GLUCOSE BLD-MCNC: 100 MG/DL (ref 70–99)
GLUCOSE BLD-MCNC: 189 MG/DL (ref 65–105)
GLUCOSE BLD-MCNC: 261 MG/DL (ref 65–105)
GLUCOSE BLD-MCNC: 267 MG/DL (ref 65–105)
GLUCOSE BLD-MCNC: 84 MG/DL (ref 65–105)
POTASSIUM SERPL-SCNC: 4.9 MMOL/L (ref 3.7–5.3)
SODIUM BLD-SCNC: 138 MMOL/L (ref 135–144)

## 2021-11-21 PROCEDURE — 6370000000 HC RX 637 (ALT 250 FOR IP): Performed by: INTERNAL MEDICINE

## 2021-11-21 PROCEDURE — 97110 THERAPEUTIC EXERCISES: CPT

## 2021-11-21 PROCEDURE — 97530 THERAPEUTIC ACTIVITIES: CPT

## 2021-11-21 PROCEDURE — 97129 THER IVNTJ 1ST 15 MIN: CPT

## 2021-11-21 PROCEDURE — 6370000000 HC RX 637 (ALT 250 FOR IP): Performed by: STUDENT IN AN ORGANIZED HEALTH CARE EDUCATION/TRAINING PROGRAM

## 2021-11-21 PROCEDURE — 6370000000 HC RX 637 (ALT 250 FOR IP): Performed by: FAMILY MEDICINE

## 2021-11-21 PROCEDURE — 1180000000 HC REHAB R&B

## 2021-11-21 PROCEDURE — 80048 BASIC METABOLIC PNL TOTAL CA: CPT

## 2021-11-21 PROCEDURE — 36415 COLL VENOUS BLD VENIPUNCTURE: CPT

## 2021-11-21 PROCEDURE — 99232 SBSQ HOSP IP/OBS MODERATE 35: CPT | Performed by: PHYSICAL MEDICINE & REHABILITATION

## 2021-11-21 PROCEDURE — 99232 SBSQ HOSP IP/OBS MODERATE 35: CPT | Performed by: FAMILY MEDICINE

## 2021-11-21 PROCEDURE — 82947 ASSAY GLUCOSE BLOOD QUANT: CPT

## 2021-11-21 PROCEDURE — 97116 GAIT TRAINING THERAPY: CPT

## 2021-11-21 PROCEDURE — 97535 SELF CARE MNGMENT TRAINING: CPT

## 2021-11-21 PROCEDURE — 97130 THER IVNTJ EA ADDL 15 MIN: CPT

## 2021-11-21 RX ADMIN — INSULIN LISPRO 3 UNITS: 100 INJECTION, SOLUTION INTRAVENOUS; SUBCUTANEOUS at 17:17

## 2021-11-21 RX ADMIN — INSULIN LISPRO 10 UNITS: 100 INJECTION, SOLUTION INTRAVENOUS; SUBCUTANEOUS at 08:19

## 2021-11-21 RX ADMIN — DOCUSATE SODIUM 100 MG: 100 CAPSULE ORAL at 08:19

## 2021-11-21 RX ADMIN — INSULIN GLARGINE 73 UNITS: 100 INJECTION, SOLUTION SUBCUTANEOUS at 21:23

## 2021-11-21 RX ADMIN — GABAPENTIN 300 MG: 300 CAPSULE ORAL at 21:16

## 2021-11-21 RX ADMIN — GABAPENTIN 300 MG: 300 CAPSULE ORAL at 08:19

## 2021-11-21 RX ADMIN — FUROSEMIDE 80 MG: 40 TABLET ORAL at 17:16

## 2021-11-21 RX ADMIN — RANOLAZINE 1000 MG: 1000 TABLET, FILM COATED, EXTENDED RELEASE ORAL at 15:06

## 2021-11-21 RX ADMIN — PANTOPRAZOLE SODIUM 40 MG: 40 TABLET, DELAYED RELEASE ORAL at 06:35

## 2021-11-21 RX ADMIN — INSULIN LISPRO 5 UNITS: 100 INJECTION, SOLUTION INTRAVENOUS; SUBCUTANEOUS at 12:25

## 2021-11-21 RX ADMIN — FUROSEMIDE 80 MG: 40 TABLET ORAL at 08:19

## 2021-11-21 RX ADMIN — RANOLAZINE 1000 MG: 1000 TABLET, FILM COATED, EXTENDED RELEASE ORAL at 21:33

## 2021-11-21 RX ADMIN — TRAZODONE HYDROCHLORIDE 75 MG: 50 TABLET ORAL at 21:16

## 2021-11-21 RX ADMIN — TAMSULOSIN HYDROCHLORIDE 0.4 MG: 0.4 CAPSULE ORAL at 08:19

## 2021-11-21 RX ADMIN — INSULIN LISPRO 10 UNITS: 100 INJECTION, SOLUTION INTRAVENOUS; SUBCUTANEOUS at 12:27

## 2021-11-21 RX ADMIN — INSULIN LISPRO 10 UNITS: 100 INJECTION, SOLUTION INTRAVENOUS; SUBCUTANEOUS at 17:20

## 2021-11-21 RX ADMIN — DOCUSATE SODIUM 100 MG: 100 CAPSULE ORAL at 21:16

## 2021-11-21 RX ADMIN — ATORVASTATIN CALCIUM 80 MG: 80 TABLET, FILM COATED ORAL at 08:19

## 2021-11-21 RX ADMIN — INSULIN LISPRO 10 UNITS: 100 INJECTION, SOLUTION INTRAVENOUS; SUBCUTANEOUS at 08:18

## 2021-11-21 RX ADMIN — BUSPIRONE HYDROCHLORIDE 10 MG: 10 TABLET ORAL at 21:16

## 2021-11-21 RX ADMIN — BUSPIRONE HYDROCHLORIDE 10 MG: 10 TABLET ORAL at 15:06

## 2021-11-21 RX ADMIN — BUSPIRONE HYDROCHLORIDE 10 MG: 10 TABLET ORAL at 08:19

## 2021-11-21 RX ADMIN — POLYETHYLENE GLYCOL 3350 17 G: 17 POWDER, FOR SOLUTION ORAL at 08:19

## 2021-11-21 RX ADMIN — INSULIN LISPRO 5 UNITS: 100 INJECTION, SOLUTION INTRAVENOUS; SUBCUTANEOUS at 21:24

## 2021-11-21 RX ADMIN — INSULIN GLARGINE 73 UNITS: 100 INJECTION, SOLUTION SUBCUTANEOUS at 08:17

## 2021-11-21 RX ADMIN — CARVEDILOL 6.25 MG: 6.25 TABLET, FILM COATED ORAL at 08:19

## 2021-11-21 ASSESSMENT — PAIN SCALES - GENERAL: PAINLEVEL_OUTOF10: 0

## 2021-11-21 NOTE — PLAN OF CARE
Problem: Skin Integrity:  Goal: Will show no infection signs and symptoms  Description: Will show no infection signs and symptoms  11/21/2021 0222 by Alyssa Acevedo RN  Outcome: Ongoing  Note: No new s/s of infection. Will continue to monitor        Problem: Skin Integrity:  Goal: Absence of new skin breakdown  Description: Absence of new skin breakdown  11/21/2021 0222 by Alyssa Acevedo RN  Outcome: Ongoing  Note: Pt skin integrity remained intact, no new alterations noted. Head to toe completed, see chart assessment. Problem: Falls - Risk of:  Goal: Will remain free from falls  Description: Will remain free from falls  11/21/2021 0222 by Alyssa Acevedo RN  Outcome: Ongoing  Note: Pt remained absent from falls. Call light within reach. Bed locked and in lowest position. Problem: Falls - Risk of:  Goal: Absence of physical injury  Description: Absence of physical injury  11/21/2021 0222 by Alyssa Acevedo RN  Outcome: Ongoing  Note: Patient remains free of injury. safe environment maintained       Problem: Neurological  Goal: Maximum potential motor/sensory/cognitive function  11/21/2021 0222 by Alyssa Acevedo RN  Outcome: Ongoing  Note: Ongoing improvement      Problem: Nutrition  Goal: Optimal nutrition therapy  11/21/2021 0222 by Alyssa Acevedo RN  Outcome: Ongoing  Note: Adequate nutrition maintained      Problem: Serum Glucose Level - Abnormal:  Goal: Ability to maintain appropriate glucose levels will improve  Description: Ability to maintain appropriate glucose levels will improve  11/21/2021 0222 by Alyssa Acevedo RN  Outcome: Ongoing  Note: Glucose levels improving      Problem: Pain:  Goal: Pain level will decrease  Description: Pain level will decrease  11/21/2021 0222 by Alyssa Acevedo RN  Outcome: Ongoing  Note: Chronic pain in feet. Medicated as needed. Patient tolerating.  Will continue to monitor     Problem: Pain:  Goal: Control of acute pain  Description: Control of acute pain  11/21/2021 0222 by Nader Gomez RN  Outcome: Ongoing  Note: Chronic pain in feet. Medicated as needed. Patient tolerating. Will continue to monitor     Problem: Pain:  Goal: Control of chronic pain  Description: Control of chronic pain  11/21/2021 0222 by Nader Gomez RN  Outcome: Ongoing  Note: Chronic pain in feet. Medicated as needed. Patient tolerating.  Will continue to monitor

## 2021-11-21 NOTE — CONSULTS
Department of Internal Medicine  Nephrology Cristian Wilson MD Progress Note    Reason for consultation: Management of end-stage renal disease. Consulting physician: Wilmer Mann MD.    Interval history: Patient was seen and examined today and she feels well with no shortness of breath or chest pain. She is scheduled to have AV fistula creation by Dr. Cj Mims [facility] next week Tuesday, 11/23/2021. She was transferred to the acute rehabilitation unit on 11/16/2021. History of present illness: This is a 59 y.o. female with a significant past medical history of Chronic atrial fibrillation [on Eliquis], Type 2 diabetes mellitus, essential systemic hypertension, coronary artery disease [s/p CABG in 2004 and PTCA in 2019], heart failure with preserved ejection fraction [LVEF 55%] and end-stage renal disease secondary to diabetic and hypertensive nephropathy [on routine hemodialysis on a Monday/Wednesday/Friday schedule at [address] [facility] [address] dialysis unit on [facility] under the care of Dr. Amy Charles since August 2001 using IJ tunneled dialysis catheter], who presented to the hospital for further evaluation of acute on chronic right-sided weakness, Slurred speech and confusion. She was initially diagnosed with acute/subacute stroke in the left frontal lobe with right-sided weakness 4 months ago. CT scan of the brain performed at presentation showed no acute finding but CT angiogram of the head and neck showed 70% stenosis in the cavernous/supraclinoid segment of the right internal carotid artery. There was also minimal punctate calcification along the posterior aspect of the right globe.     Scheduled Meds:   polyethylene glycol  17 g Oral Daily    insulin glargine  73 Units SubCUTAneous BID    busPIRone  10 mg Oral TID    insulin lispro  10 Units SubCUTAneous TID WC    [Held by provider] apixaban  5 mg Oral BID    [Held by provider] aspirin  81 mg Oral Daily    atorvastatin  80 mg Oral Daily    carvedilol  6.25 mg Oral BID    docusate sodium  100 mg Oral BID    febuxostat  40 mg Oral Daily    ferrous sulfate  325 mg Oral BID WC    furosemide  80 mg Oral BID    gabapentin  300 mg Oral BID    insulin lispro  0-18 Units SubCUTAneous TID WC    insulin lispro  0-9 Units SubCUTAneous Nightly    pantoprazole  40 mg Oral QAM AC    ranolazine  1,000 mg Oral BID    tamsulosin  0.4 mg Oral Daily    traZODone  75 mg Oral Nightly     Continuous Infusions:   sodium chloride      dextrose       PRN Meds:.sodium citrate **AND** sodium citrate, anticoagulant sodium citrate **AND** anticoagulant sodium citrate, sodium chloride, ondansetron **OR** [DISCONTINUED] ondansetron, dextrose, dextrose, glucagon (rDNA), glucose, lactulose, sodium chloride flush, acetaminophen, senna, bisacodyl    Physical Exam:    VITALS:  /65   Pulse 67   Temp 97.9 °F (36.6 °C)   Resp 19   Ht 5' 5\" (1.651 m)   Wt 242 lb 15.2 oz (110.2 kg)   SpO2 97%   BMI 40.43 kg/m²   24HR INTAKE/OUTPUT:      Intake/Output Summary (Last 24 hours) at 11/21/2021 1440  Last data filed at 11/20/2021 1605  Gross per 24 hour   Intake 960 ml   Output --   Net 960 ml     Constitutional: alert, appears stated age and cooperative    Skin: Skin color, texture, turgor normal. No rashes or lesions    Head: Normocephalic, without obvious abnormality, atraumatic     Cardiovascular/Edema: irregularly irregular rhythm    Respiratory: clear to auscultation bilaterally    Abdomen: soft, non-tender; bowel sounds normal; no masses,  no organomegaly    Back: symmetric, no curvature. ROM normal. No CVA tenderness. Extremities: edema +    Neuro:  Awake but with slurred speech; muscle power 1/5 in right upper and lower extremity. CBC:   No results for input(s): WBC, HGB, PLT in the last 72 hours.   BMP:    Recent Labs     11/19/21  0628 11/21/21  0654    138   K 4.3 4.9    100   CO2 27 26   BUN 41* 45*   CREATININE 2.46* 3.13* GLUCOSE 202* 100*     Lab Results   Component Value Date    NITRU NEGATIVE 11/14/2021    COLORU Yellow 11/14/2021    PHUR 5.0 11/14/2021    WBCUA 5 TO 10 11/14/2021    RBCUA 0 TO 2 11/14/2021    MUCUS NOT REPORTED 11/14/2021    TRICHOMONAS NOT REPORTED 11/14/2021    YEAST FEW 11/14/2021    BACTERIA MANY 11/14/2021    SPECGRAV 1.014 11/14/2021    LEUKOCYTESUR TRACE 11/14/2021    UROBILINOGEN Normal 11/14/2021    BILIRUBINUR NEGATIVE 11/14/2021    GLUCOSEU 3+ 11/14/2021    KETUA NEGATIVE 11/14/2021    AMORPHOUS NOT REPORTED 11/14/2021     Urine Creatinine:     Lab Results   Component Value Date    LABCREA 72.0 11/14/2021     IMPRESSION/RECOMMENDATIONS:    1. Acute kidney injury [secondary to acute tubular necrosis and dialysis dependent since August 2021] - will maintain MWF hemodialysis schedule. Renal diet,i.e 2-gram sodium,2-gram potassium,1500 ml fluid restriction,1-gram phosphorus, 1800 KCal and 1.2 gram protein per day. 2.  Acute on chronic right-sided weakness - Neurology input noted. 3.  Systemic hypertension - Blood pressure is adequately controlled. 4.  Mineral bone disease profile -    PLAN  Plan for dialysis tomorrow Monday, 11/22/2021. She is scheduled for AV fistula in the outpatient on Tuesday, 11/23/2021  Okay to discharge on Monday, 11/22/2021 after dialysis. Prognosis is guarded. MD LESIA Gonzalez.   Attending Nephrologist  11/21/2021 2:40 PM

## 2021-11-21 NOTE — PROGRESS NOTES
Physical Therapy  Facility/Department: [facility] MED SURG  Daily Treatment Note  NAME: Scott Mccarthy  : 1960  MRN: [id number]    Date of Service: 2021    Discharge Recommendations:  Patient would benefit from continued therapy after discharge   PT Equipment Recommendations  Equipment Needed:  (TBD)    Assessment   Body structures, Functions, Activity limitations: Decreased functional mobility ; Decreased strength; Decreased cognition; Decreased endurance; Decreased sensation; Decreased balance; Decreased coordination; Decreased posture; Increased pain; Decreased ROM  Assessment:  Pt with recent left frontal stroke with residual mild right-sided weakness, pt is a Hemodialysis pt. Presents with impaired cognition, increased Right side weakness and sensation deficits. Pt requiring SBA for bed mobility, and CGA for transfers and short distance gait 30-40' on both level tile and carpeting. pt is motivated and has good home support. pt would benefit from continued PT services to address deficits of strength, coordination, balance, posture, and overall functional mobility to allow for a safe return home. Treatment Diagnosis: impaired mobility 2* CVA  Specific instructions for Next Treatment: 1 Step negotiation, RLE strengthening, dynamic balance training, Endurance training  Prognosis: Good  Decision Making: Medium Complexity  History: Hemodialysis pt, Recent CVA, Chronic back/neck pain. Exam: ROM, MMT, Balance and functional mobility assessments. PASS assessment  Clinical Presentation: pt is pleasant and cooperative throughout. Barriers to Learning: cognition/memory deficits  REQUIRES PT FOLLOW UP: Yes  Activity Tolerance  Activity Tolerance: Patient Tolerated treatment well  Other Comments  Comments: Treatment cut short d/t transport arriving to take patient to MRI      Patient Diagnosis(es): There were no encounter diagnoses.      has a past medical history of Backache, unspecified, CHF (congestive heart failure) ([facility] [address] Chronic kidney disease, Coronary atherosclerosis of artery bypass graft, Cramp of limb, Gallstones, Hypertension, Insomnia, Pneumonia, Type II or unspecified type diabetes mellitus with renal manifestations, not stated as uncontrolled(250.40), Type II or unspecified type diabetes mellitus without mention of complication, not stated as uncontrolled, and Unspecified vitamin D deficiency. has a past surgical history that includes Coronary artery bypass graft; Knee arthroscopy; Carpal tunnel release; Breast surgery; Tonsillectomy; Hand surgery; Ankle fracture surgery; Cholecystectomy, open (N/A); and IR TUNNELED CVC PLACE WO SQ PORT/PUMP > 5 YEARS (8/18/2021). Restrictions  Restrictions/Precautions  Restrictions/Precautions: Contact Precautions, Fall Risk  Required Braces or Orthoses?: Yes  Implants present? :  (pt denies)  Required Braces or Orthoses  Right Lower Extremity Brace:  (-)  Left Lower Extremity Brace:  (-)  Position Activity Restriction  Other position/activity restrictions: Hemodialysis MWF     Subjective   General  Chart Reviewed: Yes  Additional Pertinent Hx: This is a 59 y.o. female with a significant past medical history of Chronic atrial fibrillation on Eliquis, Type 2 diabetes mellitus, essential systemic hypertension, coronary artery disease s/p CABG in 2004 and PTCA in 2019, heart failure with preserved ejection fraction LVEF 55% and end-stage renal disease secondary to diabetic and hypertensive nephropathy on routine hemodialysis on a Monday/Wednesday/Friday schedule at [address] [place] [address] dialysis unit on [facility] under the care of Dr. Ever Gordillo since August 2001 using IJ tunneled dialysis catheter, who presented to the hospital for further evaluation of acute on chronic right-sided weakness, Slurred speech and confusion. She was initially diagnosed with acute/subacute stroke in the left frontal lobe with right-sided weakness 4 months ago. Neurology consulted , MRI brain pending. Response To Previous Treatment: Patient with no complaints from previous session. Family / Caregiver Present: No  Referring Practitioner: Dr. Shanae Li: Pt reports that she feels much better, compared to when she arrived. On admission, she reported that her R LE/foot is was numb and difficult to move. Pain Screening  Patient Currently in Pain: Denies  Pain Assessment  Response to Pain Intervention: Patient Satisfied  Vital Signs  Patient Currently in Pain: Denies  Oxygen Therapy  O2 Device: None (Room air)  Patient Observation  Observations: calm and cooperative       Orientation  Orientation  Overall Orientation Status: Within Functional Limits    Objective      Transfers  Sit to Stand: Stand by assistance  Stand to sit: Stand by assistance  Bed to Chair: Stand by assistance  Stand Pivot Transfers: Stand by assistance  Comment: uses rollator or RW  Ambulation  Ambulation?: Yes  More Ambulation?: Yes  Ambulation 1  Surface: level tile  Device: Rollator  Assistance: Stand by assistance  Gait Deviations: Slow Annie; Decreased step length; Decreased step height  Distance: 115 feet, 115 feet, 170 feet  Ambulation 2  Surface - 2: ramp  Device 2: Rollator  Assistance 2: Stand by assistance  Gait Deviations: Slow Annie; Decreased step length; Decreased step height  Distance: incline: 30ft, 65ft  ; decline: 30ft, 65ft (using rollator brakes to set her pace)     Balance  Posture: Good  Sitting - Static: Good  Sitting - Dynamic: Fair; +  Standing - Static: Good  Standing - Dynamic: Fair  Comments: Standing balance assessed with RW or rollator  Other exercises  Other exercises?: Yes  Other exercises 1: Seated bilateral LE exercises, x15, R LE 1#, L LE 2#, lime (minimal+) resistance band     (in AM and PM)  Other exercises 5: NuStep: 15 min, workload 4 (seat: 9 ; hands: 9)  Other exercises 8: Standing BLE ex's in // bars. Reps: 10-15 each.   Other exercises 9: UBE: 5 min FWD, 5 min BWD Comment: rest breaks PRN.        Goals  Short term goals  Time Frame for Short term goals: 5 days  Short term goal 1: pt to demo all bed mobility with supervision to decrease burden of care  Short term goal 2: pt to demo all transfers with SBA using RW  Short term goal 3: pt to amb 48' with 2 turns using RW and SBA  Short term goal 4: pt to negotiate at least 1 step with UE support and SBA to allow for safe access into home shower  Short term goal 5: Improve R LE strengthto 2 to 2+/5 to improve fucntion  Long term goals  Time Frame for Long term goals : Until D/C  Long term goal 1: pt to demo all bed mobility independently to decrease burden of care  Long term goal 2: pt to demo all transfers with Mod I with RW  Long term goal 3: pt to amb 125' with RW on even surfaces and at least 10' uneven ramped surfaces with RW demonstrating Mod I   Long term goal 4: pt to demo improved RLE strength by 1/2 manual muscle grade to improve safety with functional mobility   Long term goal 5: pt to improve dynamic standing balance to fair + to improve safety with functional mobility   Long term goal 6: pt to improve PASS score to 30/36 to demo improved balance and safety with mobility   Patient Goals   Patient goals : to get stronger    Plan    Plan  Times per week: 900 minutes for combined PT/OT/ST therapies 2* HemoDialysis  Plan weeks: 7-10 days  Specific instructions for Next Treatment: 1 Step negotiation, RLE strengthening, dynamic balance training, Endurance training  Current Treatment Recommendations: Strengthening, Balance Training, Functional Mobility Training, Transfer Training, Endurance Training, Gait Training, Home Exercise Program, Safety Education & Training, Patient/Caregiver Education & Training  Safety Devices  Type of devices: Call light within reach, Gait belt, Patient at risk for falls, All fall risk precautions in place, Left in chair     Therapy Time     11/21/21 1103 11/21/21 1425   PT Individual Minutes Time In 1103 1425   Time Out [address] [place], [place]

## 2021-11-21 NOTE — PROGRESS NOTES
Physical Medicine & Rehabilitation  Progress Note    11/21/2021 1:07 PM     CC: Ambulatory and ADL dysfunction due tor ecrudescence of right-sided and cognitive stroke symptoms    Subjective:   No complaints. Feels well. ROS:  Denies fevers, chills, sweats. No chest pain, palpitations, lightheadedness. Denies coughing, wheezing or shortness of breath. Denies abdominal pain, nausea, diarrhea or constipation. No new areas of joint pain. Denies new areas of numbness or weakness. Denies new anxiety or depression issues. No new skin problems. Rehabilitation:   PT:  Restrictions/Precautions: Contact Precautions, Fall Risk  Implants present? :  (pt denies)  Other position/activity restrictions: Hemodialysis MWF  Required Braces or Orthoses  Right Lower Extremity Brace:  (-)  Left Lower Extremity Brace:  (-)   Transfers  Sit to Stand: Stand by assistance  Stand to sit: Stand by assistance  Bed to Chair: Stand by assistance  Stand Pivot Transfers: Stand by assistance  Comment: Rolling walker. Pt demonstrates safe techniques. Ambulation 1  Surface: level tile  Device: Rolling Walker  Other Apparatus: Wheelchair follow  Assistance: Contact guard assistance, Stand by assistance (CGA progressing to SBA)  Quality of Gait: Pt demos a decrease steady gait. Minimal R foot clearance of floor but demonstrating fair heel strike. Gait Deviations: Slow Annie, Increased ELISABET, Decreased step length, Decreased step height  Distance: 35ft, 45ft  Comments: Pt requires a seated rest break between amb. OT:  ADL  Feeding: Setup  Grooming: Modified independent   UE Bathing: Setup  LE Bathing: Setup, Supervision  UE Dressing: Setup  LE Dressing: Setup  Toileting: Supervision  Additional Comments: Assist to maria de jesus lotion to feet. Pt able to reach to ankles.           Balance  Sitting Balance: Independent  Standing Balance: Supervision   Standing Balance  Time: 1-2 min trials  Activity: self care, mobility in room Comment: with RW, reports prior limited stand ilana  Functional Mobility  Functional - Mobility Device: Rolling Walker  Activity: To/from bathroom, [employer], Transport items  Assist Level: Supervision  Functional Mobility Comments: Verbal cues for hand placement and safety     Bed mobility  Bridging: Contact guard assistance (Writer to assist holding RLE in place. )  Rolling to Left: Supervision  Rolling to Right: Stand by assistance  Supine to Sit: Modified independent  Sit to Supine: Stand by assistance (Assist level reported by Speech Therapist)  Scooting: Minimal assistance (Bracing R foot to scoot HOB; Pt using rails, bridging)  Comment: Pt performs bed mobility with HOB slightly elevated and minimal use of bed rails. Transfers  Stand Step Transfers: Supervision  Stand Pivot Transfers: Supervision  Sit to stand: Modified independent  Stand to sit: Modified independent  Transfer Comments: Verbal cues for hand placement and safety   Toilet Transfers  Toilet - Technique: Ambulating  Equipment Used: Standard toilet (with bilateral toilet rails)  Toilet Transfer: Supervision  Toilet Transfers Comments: RW     Shower Transfers  Shower - Transfer To: Shower seat with back  Shower - Technique: Ambulating  Shower Transfers: Minimal assistance  Shower Transfers Comments: OT assisted pt in backing up to shower with RW and utilizing grab bars to assist with back steping over threshold and sitting on shower chair. Pt demonstrated good safety with increased time to complete shower transfer. ST:    Subjective: [x]? Alert     [x]? Cooperative     []? Confused     []? Agitated    []? Lethargic  Objective/Assessment:  Attention: sustained throughout, distractions in room minimized.      Orientation: x4 anthony     Recall:   Continued pt ed provided during session re use of external memory aide (memory book). Pt again  Observed using memory book to assist in recall of information provided by physician during session. Pt utilizing memory book appropriately. Pt also observed utilizing [employer] darrin in phone to assist in recall of functional information (upcoming appointments).     5 unit word list recall: 80% accuracy anthony, no increase with max cues provided. Pt verbalized understanding of strategy but demonstrating difficulty implementing strategy into task.      Organization: NA     Problem Solving/Reasoning:    Divergent thinking:   categorization grid completed with mod assist. Pt demonstrated difficulty understanding direction of task. Fill in the blank (category provided)-Pt completed task with 90% accuracy anthony      Other:  Writing: Pt spelling target words in isolation with 100% accuracy (during categorization grid task)        Objective:  /65   Pulse 67   Temp 97.9 °F (36.6 °C)   Resp 19   Ht 5' 5\" (1.651 m)   Wt 242 lb 15.2 oz (110.2 kg)   SpO2 97%   BMI 40.43 kg/m²  I Body mass index is 40.43 kg/m². I   Wt Readings from Last 1 Encounters:   21 242 lb 15.2 oz (110.2 kg)      Temp (24hrs), Av.8 °F (36.6 °C), Min:97.7 °F (36.5 °C), Max:97.9 °F (36.6 °C)         GEN: well developed, well nourished, no acute distress  HEENT: Normocephalic atraumatic, EOMI, mucous membranes pink and moist  CV: RRR, no murmurs, rubs or gallops  PULM: CTAB, no rales or rhonchi. Respirations WNL and unlabored  ABD: soft, NT, ND, +BS and equal  NEURO: A&O x3. Sensation intact to light touch. MSK: 4+5 upper and lower extremities  EXTREMITIES: No calf tenderness to palpation bilaterally. Lymphedema bilateral lower extremities  SKIN: warm dry and intact with good turgor right medial great toe darkened area half by half centimeter, no erythema  PSYCH: appropriately interactive. Affect WNL.   Good spirits        Medications   Scheduled Meds:   polyethylene glycol  17 g Oral Daily    insulin glargine  73 Units SubCUTAneous BID    busPIRone  10 mg Oral TID    insulin lispro  10 Units SubCUTAneous TID WC    [Held by provider] apixaban  5 mg Oral BID    [Held by provider] aspirin  81 mg Oral Daily    atorvastatin  80 mg Oral Daily    carvedilol  6.25 mg Oral BID    docusate sodium  100 mg Oral BID    febuxostat  40 mg Oral Daily    ferrous sulfate  325 mg Oral BID WC    furosemide  80 mg Oral BID    gabapentin  300 mg Oral BID    insulin lispro  0-18 Units SubCUTAneous TID WC    insulin lispro  0-9 Units SubCUTAneous Nightly    pantoprazole  40 mg Oral QAM AC    ranolazine  1,000 mg Oral BID    tamsulosin  0.4 mg Oral Daily    traZODone  75 mg Oral Nightly     Continuous Infusions:   sodium chloride      dextrose       PRN Meds:.sodium citrate **AND** sodium citrate, anticoagulant sodium citrate **AND** anticoagulant sodium citrate, sodium chloride, ondansetron **OR** [DISCONTINUED] ondansetron, dextrose, dextrose, glucagon (rDNA), glucose, lactulose, sodium chloride flush, acetaminophen, senna, bisacodyl     Diagnostics:     CBC: No results for input(s): WBC, RBC, HGB, HCT, MCV, RDW, PLT in the last 72 hours. BMP:   Recent Labs     11/19/21  0628 11/21/21  0654    138   K 4.3 4.9    100   CO2 27 26   BUN 41* 45*   CREATININE 2.46* 3.13*     BNP: No results for input(s): BNP in the last 72 hours. PT/INR: No results for input(s): PROTIME, INR in the last 72 hours. APTT: No results for input(s): APTT in the last 72 hours. CARDIAC ENZYMES: No results for input(s): CKMB, CKMBINDEX, TROPONINT in the last 72 hours. Invalid input(s): CKTOTAL;3  FASTING LIPID PANEL:  Lab Results   Component Value Date    CHOL 105 04/09/2015    HDL 43 04/09/2015    TRIG 168 04/09/2015     LIVER PROFILE: No results for input(s): AST, ALT, ALB, BILIDIR, BILITOT, ALKPHOS in the last 72 hours. I/O (24Hr):     Intake/Output Summary (Last 24 hours) at 11/21/2021 1307  Last data filed at 11/20/2021 1605  Gross per 24 hour   Intake 960 ml   Output --   Net 960 ml       Glu last 24 hour  Recent Labs     11/20/21  1625 11/20/21 2011 11/21/21  0621 11/21/21  1043   POCGLU 220* 179* 84 261*       No results for input(s): CLARITYU, COLORU, PHUR, SPECGRAV, PROTEINU, RBCUA, BLOODU, BACTERIA, NITRU, WBCUA, LEUKOCYTESUR, YEAST, GLUCOSEU, BILIRUBINUR in the last 72 hours. X-ray left foot 11/19  Impression:     1. Diffuse soft tissue swelling along the left foot without evidence of an   acute fracture or osteomyelitis. Impression/Plan:    1. Recrudescence of right-sided and cognitive stroke symptoms:  PT/OT for gait, mobility, strengthening, endurance, ADLs, and self care.  SLP for cognition. 2. CVA-on aspirin (on hold as below), eliquis (on hold as below), and atorvastatin.  Follow up with neurology. 3. Anemia:  Hemoglobin 11.7 on 11/17, stable.  Monitoring.  On iron supplementation. 4. Eschar on left great toe:  Wound care following. Podiatry consulted by IM - ordered x-ray of the left foot-as above, podiatry evaluation appreciated-nonsurgical, can follow-up as outpatient. 5. AURELIA on CKD:  On hemodialysis MWF.  Nephrology following. Dialysis was initially to be changed to today, Tuesday and Friday due to holiday, however considering AV fistula placement Tuesday 9/23 at 12:30 PM, noted change to [facility] [place]  - will plan to discharge at 11am that day so that she can have procedure done. Eliquis and aspirin on hold for procedure per instructions patient was given. NPO at midnight on 11/23.-Discussed with Dr. Ovidio Ramirez, notes aspirin Eliquis already on hold, n.p.o. midnight, sips of meds and can take carvedilol in a.m., insulin to be held morning 11/23, resumption of medications per surgeon post procedure  6. CHF, HTN:  On carvedilol, furosemide  7. Lymphedema bilateral lower extremity-has stockings  8. Type 2 diabetes with neuropathy:  On lantus BID - IM increased dose on 11/18, humalog TID + sliding scale.  Has gabapentin BID  9. Insomnia:  On trazodone nightly  10.  Adjustment disorder with anxiety:  Psychiatry consulted in acute care - recommended titration of buspar to 10mg TID, discussed with patient and increased the dose 11/17; also recommended consideration of an acetylcholinesterase inhibitor - will hold off for now, as patient is doing well with speech therapy and utilizing strategies to help with memory. Recommend outpatient counseling. 11. Gout:  On febuxostat  12. Obesity:  BMI 41.02  13. Bowel Management: Docusate BID, miralax daily, senokot prn, dulcolax prn. 14. DVT Prophylaxis:  On eliquis (on hold as above)  15. Internal Medicine for medical management   16. Discharge plan 11/22 AM if okay with family practice, patient to go for AV fistula-n.p.o. previous night, uppercase clarification of medications prior to procedure with family practice, await surgical input, follow-up with 1. PCP Maris KILLIAN 2. Nephrology 3. Surgeon 4. Rehab, 5. Podiatry, 6. Psychiatry/counseling questionable home versus outpatient PT/OT, questionable Eliquis new, patient resume aspirin/Eliquis when okay with surgeon      Tonio Trejo. Tom Cruz MD       This note is created with the assistance of a speech recognition program.  While intending to generate a document that actually reflects the content of the visit, the document can still have some errors including those of syntax and sound a like substitutions which may escape proof reading.   In such instances, actual meaning can be extrapolated by contextual diversion

## 2021-11-21 NOTE — PROGRESS NOTES
[address]   ACUTE REHABILITATION OCCUPATIONAL THERAPY  DAILY NOTE    Date: 21  Patient Name: Antoinette Tsai      Room: 3258/3409-85    MRN: [id number]   : 1960  (59 y.o.)  Gender: female   Referring Practitioner: Chiara Schmitt MD  Diagnosis: Stroke-Like Symptoms       Restrictions  Restrictions/Precautions: Contact Precautions, Fall Risk  Implants present? :  (pt denies)  Other position/activity restrictions: Hemodialysis MWF  Required Braces or Orthoses?: Yes    Subjective  Comments: Pt pleasant and motivated. Patient Currently in Pain: No  Restrictions/Precautions: Contact Precautions; Fall Risk  Overall Orientation Status: Impaired  Orientation Level: Oriented X4 (Requires use of communication board in room for date. )     Pain Assessment  Pain Assessment: 0-10  Pain Level: 5  Pain Type: Acute pain  Pain Location: Leg, Foot  Pain Orientation: Right, Left  Pain Descriptors: Tingling    Objective  Cognition  Overall Cognitive Status: Impaired  Memory: Decreased long term memory  Perception  Overall Perceptual Status: WFL  Balance  Sitting Balance: Independent  Standing Balance: Supervision  Bed mobility  Supine to Sit: Modified independent  Transfers  Sit to stand: Modified independent  Stand to sit: Modified independent  Standing Balance  Activity: self care, mobility in room   Functional Mobility  Functional - Mobility Device: Rolling Walker  Activity: To/from bathroom; Retrieve items; Transport items  Assist Level: Supervision  Toilet Transfers  Toilet - Technique: Ambulating  Equipment Used: Standard toilet (with bilateral toilet rails)  Toilet Transfer: Supervision     Type of ROM/Therapeutic Exercise  Type of ROM/Therapeutic Exercise: AROM  Exercises  Other: green theraflex bar upward/downward bends/twisting x 20 to address B gross motor control and strength. pt tolerates well.       Additional Activities: Other  Additional Activities: key pegs placed/removed using BUE to improve fine motor coordination skills. Pt requested task, very motivated. ADL  Feeding: Setup  Grooming: Modified independent   UE Bathing: Setup  LE Bathing: Setup; Supervision  UE Dressing: Setup  LE Dressing: Minimal assistance (Assist with donng B compression sleeves/wraps. )  Toileting: Supervision  Additional Comments: Assist to maria de jesus lotion to feet. Pt able to reach to ankles. Assessment  Performance deficits / Impairments: Decreased ADL status; Decreased functional mobility ; Decreased strength; Decreased safe awareness; Decreased cognition; Decreased endurance; Decreased sensation; Decreased high-level IADLs; Decreased coordination  Prognosis: Good  Discharge Recommendations: Patient would benefit from continued therapy after discharge  Activity Tolerance: Patient Tolerated treatment well; Patient limited by fatigue  Safety Devices in place: Yes  Type of devices: Call light within reach; Left in chair           Plan  Plan  Times per week: 900 minutes combined between OT/PT/SLP   Times per day: Twice a day  Current Treatment Recommendations: Self-Care / ADL, Strengthening, ROM, Balance Training, Functional Mobility Training, Endurance Training, Cognitive Reorientation, Safety Education & Training, Patient/Caregiver Education & Training, Equipment Evaluation, Education, & procurement, Home Management Training, Cognitive/Perceptual Training  Patient Goals   Patient goals : \"I would like to get my hand and right arm going. My memory back.  Just being able to walk around and get my right leg going\"  Short term goals  Time Frame for Short term goals: By 4-5 days  Short term goal 1: Pt will complete lower body dressing/bathing with CGA and good safety with use of AE as needed  Short term goal 2: Pt will complete upper body dressing with set-up assistance and good safety/attention to task  Short term goal 3: Pt will complete functional transfers/mobility during self care tasks with SBA and good safety with use of least restrictive device  Short term goal 4: Pt will tolerate standing 5+ minutes during functional activity of choice with good safety  Short term goal 5: Pt will participate in 30+ minutes of therapeutic exercises/functional activities to increase safety and independence with self care and mobility  Long term goals  Time Frame for Long term goals : By discharge  Long term goal 1: Pt will complete BADLs with modified independence and good safety with use of AE as needed.    Long term goal 2: Pt will complete functional transfers/mobility during self care tasks with modified independence and good safety with use of least restrictive device  Long term goal 3: Pt will tolerate standing 10+ minutes during functional activity of choice with good safety  Long term goal 4: Pt will verbalize/demonstrate good understanding of home safety/fall prevention strategies to increase safety and independence with self care and mobiliyt  Long term goal 5: Pt will complete simple meal prep/light house keeping task with supervision and good safety         11/21/21 0955 11/21/21 1504   OT Individual Minutes   Time In 100 [facility] 1326   Time Out 0955 1359   Minutes 51 33     Electronically signed by DAYANA Ramos on 11/21/21 at 4:22 PM EST

## 2021-11-21 NOTE — PROGRESS NOTES
Patient will receive dialysis tomorrow Monday. She is due for AV fistula creation on Tuesday in the outpatient.       Patricia Ribeiro M.D, LESIA  Nephrologist

## 2021-11-21 NOTE — FLOWSHEET NOTE
11/21/21 1540   Encounter Summary   Services provided to: Patient   Referral/Consult From: [facility]   (11/21/21V)   Volunteer Visit Yes   Complexity of Encounter Low   Length of Encounter 15 minutes   Spiritual/Taoist   Type Spiritual support   Intervention Communion; Prayer   Sacraments   Communion Patient received communion

## 2021-11-21 NOTE — PLAN OF CARE
Problem: Skin Integrity:  Goal: Will show no infection signs and symptoms  Outcome: Ongoing     Problem: Skin Integrity:  Goal: Absence of new skin breakdown  Outcome: Ongoing     Problem: Falls - Risk of:  Goal: Will remain free from falls  Outcome: Ongoing     Problem: Falls - Risk of:  Goal: Absence of physical injury  Outcome: Ongoing     Problem: Neurological  Goal: Maximum potential motor/sensory/cognitive function  Outcome: Ongoing     Problem: Nutrition  Goal: Optimal nutrition therapy  Outcome: Ongoing

## 2021-11-21 NOTE — PROGRESS NOTES
Speech Language Pathology  Speech Language Pathology  [facility] Acute Rehab Unit at [facility]    Cognitive Treatment Note    Date: 11/21/2021  Patients Name: Taylor Cr  MRN: [id number]  Diagnosis:   Patient Active Problem List   Diagnosis Code    Atherosclerosis of coronary artery bypass graft of native heart without angina pectoris I25.810    Acute renal failure ([facility] [address] N17.9    Diabetes mellitus due to underlying condition with diabetic nephropathy, with long-term current use of insulin ([facility]) E08.21, Z79.4    Chronic diastolic heart failure ([facility]) I50.32    Diabetic polyneuropathy associated with type 2 diabetes mellitus ([facility]) E11.42    History of coronary artery bypass graft Z95.1    Iron deficiency anemia D50.9    Spinal stenosis of lumbar region with neurogenic claudication M48.062    Mixed hyperlipidemia E78.2    Stage 4 chronic kidney disease ([facility]) N18.4    Type 2 diabetes mellitus with kidney complication, with long-term current use of insulin ([facility]) E11.29, Z79.4    Syncope and collapse R55    Obesity, Class II, BMI 35-39.9 E66.9    Thyroid nodule greater than or equal to 1 cm in diameter incidentally noted on imaging study E04.1    Essential hypertension I10    Anemia in stage 4 chronic kidney disease ([facility]) N18.4, D63.1    Chronic ischemic heart disease I25.9    Ischemic stroke of frontal lobe ([facility]) I63.9    Stroke-like symptoms R29.90    Morbid obesity with BMI of 40.0-44.9, adult ([facility]) E66.01, Z68.41    Acute encephalopathy G93.40    Disequilibrium syndrome E87.8    Debility R53.81    Long-term memory loss R41.3    Muscle right arm weakness M62.81    Anxiety F41.9    Chronic midline low back pain with bilateral sciatica M54.41, M54.42, G89.29    Need for immunization against influenza Z23    Altered mental status R41.82       Pain: 0/10    Cognitive Treatment      Treatment time: 2088-4641      Subjective: [x] Alert [x] Cooperative     [] Confused     [] Agitated [] Lethargic  Objective/Assessment:  Attention: sustained throughout, distractions in room minimized. Orientation: x4 anthony    Recall:   Continued pt ed provided during session re use of external memory aide (memory book). Pt again  Observed using memory book to assist in recall of information provided by physician during session. Pt utilizing memory book appropriately. Pt also observed utilizing [employer] darrin in phone to assist in recall of functional information (upcoming appointments). 5 unit word list recall: 80% accuracy anthony, no increase with max cues provided. Pt verbalized understanding of strategy but demonstrating difficulty implementing strategy into task. Organization: NA    Problem Solving/Reasoning:    Divergent thinking:   categorization grid completed with mod assist. Pt demonstrated difficulty understanding direction of task. Fill in the blank (category provided)-Pt completed task with 90% accuracy anthony     Other:  Writing: Pt spelling target words in isolation with 100% accuracy (during categorization grid task)        Plan:  [x] Continue ST services    [] Discharge from ST:      Discharge recommendations: []  Further therapy recommended at discharge. The patient should be able to tolerate at least 3 hours of therapy per day over 5 days or 15 hours over 7 days. [] Further therapy recommended at discharge. [] No therapy recommended at discharge. Treatment completed by: Cameron SPRINGER A.CCC/SLP Resolved You came to the hospital with shortness of breath that was most likely due CHF exacerbation  - Dr. Jo (cardiologist) increased your Lasix dose to 40mg IV twice a day  - Continue to follow with your cardiologist (Dr. Rowell) Controlled Your kidney function gradually improved during your hospital course  - Continue to follow with your primary care doctor on discharge You received one unit of PRBC as your hemoglobin level was low  - Please continue to follow with Dr. Temple on discharge for CBC and further management On admission you were found to have a urinary tract infection, however you did not have symptoms  - As per Dr. Grace (ID), empiric antibiotics were deferred.   - patient remained otherwise stable and the UTI was not treated Continue to take Carvedilol 12.5mg, doxazocin 4mg, hydralazine 75mg Your sodium levels were low on arrival to the hospital. Over your hospital course, the sodium level improved  - Continue to follow with your cardiologist (Dr. Rowell) You came to the hospital with shortness of breath that was most likely due CHF exacerbation  - Dr. Jo (cardiologist) increased your Lasix dose to 40mg twice a day and Hydralazine to 100mg three times a day.   - Continue to follow with your cardiologist (Dr. Rowell) You received one unit of PRBC as your hemoglobin level was low  - Please continue to follow with Dr. Temple on discharge for CBC and further management  - Continue to take your iron tablets Continue to take Carvedilol 12.5mg, doxazocin 4mg, hydralazine 100mg Your sodium levels were low on arrival to the hospital. Over your hospital course, the sodium level improved  - Continue to follow with your primary care doctor

## 2021-11-21 NOTE — PROGRESS NOTES
FAMILY MEDICINE  - PROGRESS NOTE    Date:  11/21/2021  Cristhian Hanson  [id number]      Chief complaint: Debility      Interval History:  Improved, she is doing well this am. She is scheduled for a fistula placement on 11/23. Dr. Chad Olea wants to adjust her dialysis schedule. I spoke with Anju Riggs and Tricia and the patients dialysis will be adjusted to allow her to get her fistula. No significant events overnight. Specialists notes, labs & imaging reviewed.       Subjective  Constitutional: positive for obesity  Musculoskeletal:positive for arthralgias, back pain and myalgias  Neurological: positive for memory problems  Behavioral/Psych: positive for anxiety  Endocrine: positive for diabetic symptoms including hyperglycemia:    Objective:    /65   Pulse 67   Temp 97.9 °F (36.6 °C)   Resp 19   Ht 5' 5\" (1.651 m)   Wt 242 lb 15.2 oz (110.2 kg)   SpO2 97%   BMI 40.43 kg/m²   General appearance - alert, well appearing, and in no distress and overweight  Mental status - alert, oriented to person, place, and time  Eyes - pupils equal and reactive, extraocular eye movements intact  Ears - hearing grossly normal bilaterally  Nose - normal and patent, no erythema, discharge or polyps  Mouth - mucous membranes moist, pharynx normal without lesions  Neck - supple, no significant adenopathy  Lymphatics - no palpable lymphadenopathy, no hepatosplenomegaly  Chest - clear to auscultation, no wheezes, rales or rhonchi, symmetric air entry  Heart - normal rate, regular rhythm, normal S1, S2, no murmurs, rubs, clicks or gallops  Abdomen - soft, nontender, nondistended, no masses or organomegaly  Breasts - not examined  Back exam - not examined  Neurological - alert, oriented, normal speech, no focal findings or movement disorder noted  Musculoskeletal - osteoarthritic changes noted in both hands  Extremities - peripheral pulses normal, no pedal edema, no clubbing or cyanosis  Skin - normal coloration and turgor, no rashes, no suspicious skin lesions noted    Data:   Medications:   Current Facility-Administered Medications   Medication Dose Route Frequency Provider Last Rate Last Admin    sodium citrate 4 % injection 1.9 mL  1.9 mL IntraCATHeter PRN Oneyda Vides MD   1.9 mL at 11/19/21 1812    And    sodium citrate 4 % injection 1.9 mL  1.9 mL IntraCATHeter PRN Oneyda Vides MD   1.9 mL at 11/19/21 1812    polyethylene glycol (GLYCOLAX) packet 17 g  17 g Oral Daily Kyle Churchill MD   17 g at 11/20/21 0804    insulin glargine (LANTUS) injection vial 73 Units  73 Units SubCUTAneous BID Amanuel Chambers MD   73 Units at 11/20/21 2125    busPIRone (BUSPAR) tablet 10 mg  10 mg Oral TID Kyle Cuhrchill MD   10 mg at 11/20/21 2123    anticoagulant sodium citrate 4 % injection 1.9 mL  1.9 mL IntraCATHeter PRN Oneyda Vides MD   1.9 mL at 11/17/21 1850    And    anticoagulant sodium citrate 4 % injection 1.9 mL  1.9 mL IntraCATHeter PRN Oneyda Vides MD   1.9 mL at 11/17/21 1850    insulin lispro (HUMALOG) injection vial 10 Units  10 Units SubCUTAneous TID  Lucy Viera MD   10 Units at 11/20/21 1700    0.9 % sodium chloride infusion  25 mL IntraVENous PRN Bety Bass MD        ondansetron (ZOFRAN-ODT) disintegrating tablet 4 mg  4 mg Oral Q8H PRN Bety Bass MD        dextrose 5 % solution  100 mL/hr IntraVENous PRN Bety Bass MD        dextrose 50 % IV solution  12.5 g IntraVENous PRN Bety Bass MD        glucagon (rDNA) injection 1 mg  1 mg IntraMUSCular PRN Bety Bass MD        glucose (GLUTOSE) 40 % oral gel 15 g  15 g Oral PRN Bety Bass MD        [Held by provider] apixaban Edin Lute) tablet 5 mg  5 mg Oral BID Bety Bass MD   5 mg at 11/18/21 2029    [Held by provider] aspirin chewable tablet 81 mg  81 mg Oral Daily Bety Bass MD   81 mg at 11/18/21 0840    atorvastatin (LIPITOR) tablet 80 mg  80 mg Oral Daily Gregory Diaz MD   80 mg at 11/20/21 0803    carvedilol (COREG) tablet 6.25 mg  6.25 mg Oral BID Gregory Diaz MD   6.25 mg at 11/20/21 2123    docusate sodium (COLACE) capsule 100 mg  100 mg Oral BID Gregory Diaz MD   100 mg at 11/20/21 2123    febuxostat (ULORIC) tablet 40 mg  40 mg Oral Daily Gregory Diaz MD   40 mg at 11/20/21 0816    ferrous sulfate (IRON 325) tablet 325 mg  325 mg Oral BID  Gregory Diaz MD   325 mg at 11/20/21 0803    furosemide (LASIX) tablet 80 mg  80 mg Oral BID Gregory Diaz MD   80 mg at 11/20/21 1656    gabapentin (NEURONTIN) capsule 300 mg  300 mg Oral BID Gregory Diaz MD   300 mg at 11/20/21 2123    insulin lispro (HUMALOG) injection vial 0-18 Units  0-18 Units SubCUTAneous TID  Gregory Diaz MD   6 Units at 11/20/21 1656    insulin lispro (HUMALOG) injection vial 0-9 Units  0-9 Units SubCUTAneous Nightly Gregory Diaz MD   2 Units at 11/20/21 2126    lactulose (CHRONULAC) 10 GM/15ML solution 20 g  20 g Oral TID PRN Gregory Diaz MD        pantoprazole (PROTONIX) tablet 40 mg  40 mg Oral QAM AC Gregory Diaz MD   40 mg at 11/21/21 9327    ranolazine (RANEXA) extended release tablet 1,000 mg  1,000 mg Oral BID Gregory Diaz MD   1,000 mg at 11/20/21 2122    sodium chloride flush 0.9 % injection 10 mL  10 mL IntraVENous PRN Gregory Diaz MD        tamsulosin [facility]) capsule 0.4 mg  0.4 mg Oral Daily Gregory Diaz MD   0.4 mg at 11/20/21 0804    traZODone (DESYREL) tablet 75 mg  75 mg Oral Nightly Gregory Diaz MD   75 mg at 11/20/21 2123    acetaminophen (TYLENOL) tablet 650 mg  650 mg Oral Q4H PRN Shin Pearson MD   650 mg at 11/20/21 2141    senna (SENOKOT) tablet 17.2 mg  2 tablet Oral Daily PRN Shin Pearson MD        bisacodyl (DULCOLAX) suppository 10 mg  10 mg Rectal Daily PRN Shin Pearson MD           Intake/Output Summary (Last 24 hours) at 11/21/2021 0747  Last data filed at 11/20/2021 1605  Gross per 24 hour   Intake 960 ml   Output --   Net 960 ml     Recent Results (from the past 24 hour(s))   POC Glucose Fingerstick    Collection Time: 11/20/21 10:27 AM   Result Value Ref Range    POC Glucose 298 (H) 65 - 105 mg/dL   POC Glucose Fingerstick    Collection Time: 11/20/21  4:25 PM   Result Value Ref Range    POC Glucose 220 (H) 65 - 105 mg/dL   POC Glucose Fingerstick    Collection Time: 11/20/21  8:11 PM   Result Value Ref Range    POC Glucose 179 (H) 65 - 105 mg/dL   POC Glucose Fingerstick    Collection Time: 11/21/21  6:21 AM   Result Value Ref Range    POC Glucose 84 65 - 105 mg/dL   Basic Metabolic Panel    Collection Time: 11/21/21  6:54 AM   Result Value Ref Range    Glucose 100 (H) 70 - 99 mg/dL    BUN 45 (H) 8 - 23 mg/dL    CREATININE 3.13 (H) 0.50 - 0.90 mg/dL    Bun/Cre Ratio NOT REPORTED 9 - 20    Calcium 9.5 8.6 - 10.4 mg/dL    Sodium 138 135 - 144 mmol/L    Potassium 4.9 3.7 - 5.3 mmol/L    Chloride 100 98 - 107 mmol/L    CO2 26 20 - 31 mmol/L    Anion Gap 12 9 - 17 mmol/L    GFR Non-African American 15 (L) >60 mL/min    GFR  18 (L) >60 mL/min    GFR Comment          GFR Staging NOT REPORTED      -----------------------------------------------------------------  RAD:  EKG:  Micro:     Assessment & Plan:    Patient Active Problem List:     Atherosclerosis of coronary artery bypass graft of native heart without angina pectoris     Acute renal failure (HCC)     Diabetes mellitus due to underlying condition with diabetic nephropathy, with long-term current use of insulin (HCC)     Chronic diastolic heart failure (HCC)     Diabetic polyneuropathy associated with type 2 diabetes mellitus (HCC)     History of coronary artery bypass graft     Iron deficiency anemia     Spinal stenosis of lumbar region with neurogenic claudication     Mixed hyperlipidemia     Stage 4 chronic kidney disease (HCC)     Type 2 diabetes mellitus with kidney complication, with long-term current use of insulin (HCC)     Syncope and collapse     Obesity, Class II, BMI 35-39.9     Thyroid nodule greater than or equal to 1 cm in diameter incidentally noted on imaging study     Essential hypertension     Anemia in stage 4 chronic kidney disease (HCC)     Chronic ischemic heart disease     Ischemic stroke of frontal lobe (HCC)     Stroke-like symptoms     Morbid obesity with BMI of 40.0-44.9, adult (HCC)     Acute encephalopathy     Disequilibrium syndrome     Debility     Long-term memory loss     Muscle right arm weakness     Anxiety     Chronic midline low back pain with bilateral sciatica     Need for immunization against influenza     Altered mental status           Plan:  -Recrudescence of right sided weakness and cognitive stroke symptoms - PT/OT/Speech treating. -CVA - on ASA and Eliquis, on hold for surgery Tuesday. -AURELIA on CKD - on dialysis M,W,F, fistula to be placed on 11/23, insulin to be held starting the morning of 11/23, ASA & Eliquis already on hold, pt should be NPO with sips of meds and take carvedilol Tues am.  -Discharge plan is Tues am so that patient can have her fistula placement. I am in agreement with this.  -Continue current treatments.  -Complete orders per chart.     See orders   Disposition:    Electronically signed by Soumya Horn MD on 11/21/2021 at 7:47 AM

## 2021-11-22 LAB
ANION GAP SERPL CALCULATED.3IONS-SCNC: 13 MMOL/L (ref 9–17)
BUN BLDV-MCNC: 56 MG/DL (ref 8–23)
BUN/CREAT BLD: ABNORMAL (ref 9–20)
CALCIUM SERPL-MCNC: 9.5 MG/DL (ref 8.6–10.4)
CHLORIDE BLD-SCNC: 102 MMOL/L (ref 98–107)
CO2: 24 MMOL/L (ref 20–31)
CREAT SERPL-MCNC: 3.42 MG/DL (ref 0.5–0.9)
GFR AFRICAN AMERICAN: 16 ML/MIN
GFR NON-AFRICAN AMERICAN: 14 ML/MIN
GFR SERPL CREATININE-BSD FRML MDRD: ABNORMAL ML/MIN/{1.73_M2}
GFR SERPL CREATININE-BSD FRML MDRD: ABNORMAL ML/MIN/{1.73_M2}
GLUCOSE BLD-MCNC: 128 MG/DL (ref 65–105)
GLUCOSE BLD-MCNC: 149 MG/DL (ref 65–105)
GLUCOSE BLD-MCNC: 175 MG/DL (ref 65–105)
GLUCOSE BLD-MCNC: 189 MG/DL (ref 70–99)
GLUCOSE BLD-MCNC: 301 MG/DL (ref 65–105)
POTASSIUM SERPL-SCNC: 4.7 MMOL/L (ref 3.7–5.3)
SODIUM BLD-SCNC: 139 MMOL/L (ref 135–144)

## 2021-11-22 PROCEDURE — 97530 THERAPEUTIC ACTIVITIES: CPT

## 2021-11-22 PROCEDURE — 82947 ASSAY GLUCOSE BLOOD QUANT: CPT

## 2021-11-22 PROCEDURE — 97129 THER IVNTJ 1ST 15 MIN: CPT

## 2021-11-22 PROCEDURE — 6370000000 HC RX 637 (ALT 250 FOR IP): Performed by: INTERNAL MEDICINE

## 2021-11-22 PROCEDURE — 6370000000 HC RX 637 (ALT 250 FOR IP): Performed by: FAMILY MEDICINE

## 2021-11-22 PROCEDURE — 97116 GAIT TRAINING THERAPY: CPT

## 2021-11-22 PROCEDURE — 97110 THERAPEUTIC EXERCISES: CPT

## 2021-11-22 PROCEDURE — 2500000003 HC RX 250 WO HCPCS: Performed by: INTERNAL MEDICINE

## 2021-11-22 PROCEDURE — 80048 BASIC METABOLIC PNL TOTAL CA: CPT

## 2021-11-22 PROCEDURE — 6370000000 HC RX 637 (ALT 250 FOR IP): Performed by: STUDENT IN AN ORGANIZED HEALTH CARE EDUCATION/TRAINING PROGRAM

## 2021-11-22 PROCEDURE — 1180000000 HC REHAB R&B

## 2021-11-22 PROCEDURE — 90935 HEMODIALYSIS ONE EVALUATION: CPT

## 2021-11-22 PROCEDURE — 99232 SBSQ HOSP IP/OBS MODERATE 35: CPT | Performed by: PHYSICAL MEDICINE & REHABILITATION

## 2021-11-22 PROCEDURE — 97130 THER IVNTJ EA ADDL 15 MIN: CPT

## 2021-11-22 PROCEDURE — 36415 COLL VENOUS BLD VENIPUNCTURE: CPT

## 2021-11-22 PROCEDURE — 97535 SELF CARE MNGMENT TRAINING: CPT

## 2021-11-22 RX ORDER — INSULIN GLARGINE 100 [IU]/ML
73 INJECTION, SOLUTION SUBCUTANEOUS 2 TIMES DAILY
Qty: 10 ML | Refills: 3 | Status: SHIPPED | OUTPATIENT
Start: 2021-11-22 | End: 2021-11-23

## 2021-11-22 RX ORDER — FERROUS SULFATE 325(65) MG
325 TABLET ORAL 2 TIMES DAILY WITH MEALS
Qty: 30 TABLET | Refills: 3 | Status: ON HOLD | OUTPATIENT
Start: 2021-11-22 | End: 2021-12-14

## 2021-11-22 RX ORDER — GABAPENTIN 300 MG/1
300 CAPSULE ORAL 2 TIMES DAILY
Qty: 60 CAPSULE | Refills: 0 | Status: SHIPPED | OUTPATIENT
Start: 2021-11-22 | End: 2022-04-11 | Stop reason: SDUPTHER

## 2021-11-22 RX ORDER — FEBUXOSTAT 40 MG/1
40 TABLET, FILM COATED ORAL DAILY
Qty: 30 TABLET | Refills: 0 | Status: SHIPPED | OUTPATIENT
Start: 2021-11-22 | End: 2022-09-08 | Stop reason: ALTCHOICE

## 2021-11-22 RX ORDER — DOCUSATE SODIUM 100 MG/1
100 CAPSULE, LIQUID FILLED ORAL 2 TIMES DAILY
Qty: 60 CAPSULE | Refills: 0 | Status: SHIPPED | OUTPATIENT
Start: 2021-11-22

## 2021-11-22 RX ORDER — APIXABAN 5 MG/1
5 TABLET, FILM COATED ORAL 2 TIMES DAILY
Qty: 60 TABLET | Refills: 0 | Status: SHIPPED | OUTPATIENT
Start: 2021-11-22 | End: 2022-04-06

## 2021-11-22 RX ORDER — TAMSULOSIN HYDROCHLORIDE 0.4 MG/1
0.4 CAPSULE ORAL DAILY
Qty: 30 CAPSULE | Refills: 3 | Status: SHIPPED | OUTPATIENT
Start: 2021-11-23 | End: 2022-09-27 | Stop reason: SDUPTHER

## 2021-11-22 RX ORDER — POLYETHYLENE GLYCOL 3350 17 G/17G
17 POWDER, FOR SOLUTION ORAL DAILY
Qty: 527 G | Refills: 1 | Status: SHIPPED | OUTPATIENT
Start: 2021-11-23 | End: 2021-12-24

## 2021-11-22 RX ORDER — FUROSEMIDE 80 MG
80 TABLET ORAL 2 TIMES DAILY
Qty: 60 TABLET | Refills: 0 | Status: SHIPPED | OUTPATIENT
Start: 2021-11-22 | End: 2022-03-08 | Stop reason: SDUPTHER

## 2021-11-22 RX ORDER — TRAZODONE HYDROCHLORIDE 50 MG/1
75 TABLET ORAL NIGHTLY
Qty: 45 TABLET | Refills: 0 | Status: SHIPPED | OUTPATIENT
Start: 2021-11-22 | End: 2022-02-03 | Stop reason: SDUPTHER

## 2021-11-22 RX ADMIN — CARVEDILOL 6.25 MG: 6.25 TABLET, FILM COATED ORAL at 07:44

## 2021-11-22 RX ADMIN — INSULIN LISPRO 10 UNITS: 100 INJECTION, SOLUTION INTRAVENOUS; SUBCUTANEOUS at 12:22

## 2021-11-22 RX ADMIN — INSULIN GLARGINE 73 UNITS: 100 INJECTION, SOLUTION SUBCUTANEOUS at 07:45

## 2021-11-22 RX ADMIN — Medication 1.9 ML: at 18:02

## 2021-11-22 RX ADMIN — GABAPENTIN 300 MG: 300 CAPSULE ORAL at 22:00

## 2021-11-22 RX ADMIN — GABAPENTIN 300 MG: 300 CAPSULE ORAL at 07:43

## 2021-11-22 RX ADMIN — DOCUSATE SODIUM 100 MG: 100 CAPSULE ORAL at 22:00

## 2021-11-22 RX ADMIN — BUSPIRONE HYDROCHLORIDE 10 MG: 10 TABLET ORAL at 07:43

## 2021-11-22 RX ADMIN — INSULIN LISPRO 12 UNITS: 100 INJECTION, SOLUTION INTRAVENOUS; SUBCUTANEOUS at 12:21

## 2021-11-22 RX ADMIN — TAMSULOSIN HYDROCHLORIDE 0.4 MG: 0.4 CAPSULE ORAL at 07:44

## 2021-11-22 RX ADMIN — INSULIN GLARGINE 73 UNITS: 100 INJECTION, SOLUTION SUBCUTANEOUS at 22:01

## 2021-11-22 RX ADMIN — POLYETHYLENE GLYCOL 3350 17 G: 17 POWDER, FOR SOLUTION ORAL at 07:44

## 2021-11-22 RX ADMIN — PANTOPRAZOLE SODIUM 40 MG: 40 TABLET, DELAYED RELEASE ORAL at 05:49

## 2021-11-22 RX ADMIN — FUROSEMIDE 80 MG: 40 TABLET ORAL at 18:39

## 2021-11-22 RX ADMIN — TRAZODONE HYDROCHLORIDE 75 MG: 50 TABLET ORAL at 22:00

## 2021-11-22 RX ADMIN — ATORVASTATIN CALCIUM 80 MG: 80 TABLET, FILM COATED ORAL at 07:43

## 2021-11-22 RX ADMIN — CARVEDILOL 6.25 MG: 6.25 TABLET, FILM COATED ORAL at 22:00

## 2021-11-22 RX ADMIN — ACETAMINOPHEN 650 MG: 325 TABLET ORAL at 21:59

## 2021-11-22 RX ADMIN — FEBUXOSTAT 40 MG: 40 TABLET, FILM COATED ORAL at 07:46

## 2021-11-22 RX ADMIN — INSULIN LISPRO 3 UNITS: 100 INJECTION, SOLUTION INTRAVENOUS; SUBCUTANEOUS at 07:45

## 2021-11-22 RX ADMIN — FUROSEMIDE 80 MG: 40 TABLET ORAL at 07:43

## 2021-11-22 RX ADMIN — ACETAMINOPHEN 650 MG: 325 TABLET ORAL at 07:53

## 2021-11-22 RX ADMIN — INSULIN LISPRO 10 UNITS: 100 INJECTION, SOLUTION INTRAVENOUS; SUBCUTANEOUS at 18:39

## 2021-11-22 RX ADMIN — BUSPIRONE HYDROCHLORIDE 10 MG: 10 TABLET ORAL at 22:00

## 2021-11-22 RX ADMIN — RANOLAZINE 1000 MG: 1000 TABLET, FILM COATED, EXTENDED RELEASE ORAL at 22:00

## 2021-11-22 RX ADMIN — BUSPIRONE HYDROCHLORIDE 10 MG: 10 TABLET ORAL at 12:21

## 2021-11-22 RX ADMIN — RANOLAZINE 1000 MG: 1000 TABLET, FILM COATED, EXTENDED RELEASE ORAL at 12:43

## 2021-11-22 RX ADMIN — INSULIN LISPRO 10 UNITS: 100 INJECTION, SOLUTION INTRAVENOUS; SUBCUTANEOUS at 07:44

## 2021-11-22 RX ADMIN — DOCUSATE SODIUM 100 MG: 100 CAPSULE ORAL at 07:44

## 2021-11-22 RX ADMIN — INSULIN LISPRO 2 UNITS: 100 INJECTION, SOLUTION INTRAVENOUS; SUBCUTANEOUS at 22:01

## 2021-11-22 ASSESSMENT — PAIN SCALES - GENERAL
PAINLEVEL_OUTOF10: 6
PAINLEVEL_OUTOF10: 4
PAINLEVEL_OUTOF10: 4
PAINLEVEL_OUTOF10: 0
PAINLEVEL_OUTOF10: 4

## 2021-11-22 ASSESSMENT — PAIN DESCRIPTION - ORIENTATION: ORIENTATION: POSTERIOR

## 2021-11-22 ASSESSMENT — PAIN DESCRIPTION - LOCATION: LOCATION: NECK

## 2021-11-22 ASSESSMENT — PAIN DESCRIPTION - DESCRIPTORS: DESCRIPTORS: ACHING

## 2021-11-22 ASSESSMENT — 9 HOLE PEG TEST
TESTTIME_SECONDS: 30
TESTTIME_SECONDS: 25

## 2021-11-22 ASSESSMENT — PAIN DESCRIPTION - PAIN TYPE: TYPE: ACUTE PAIN

## 2021-11-22 NOTE — PROGRESS NOTES
HEMODIALYSIS PRE-TREATMENT NOTE    Patient Identifiers prior to treatment: Name, , MRN    Isolation Required:  Yes                      Isolation Type: Contact, MRSA       (please document if patient is being managed as a PUI/COVID-19 patient)        Hepatitis status:                           Date Drawn                             Result  Hepatitis B Surface Antigen 21     Negative                     Hepatitis B Surface Antibody 21 48.51        Hepatitis B Core Antibody 21 Negative          How was Hepatitis Status verified: Epic     Was a copy of the labs you documented provided to facility for the patient's chart:     Hemodialysis orders verified: Yes    Access Within normal limits ( I.e. s/s of infection,...): Tunneled CVC to right chest wall     Pre-Assessment completed: Yes    Pre-dialysis report received from: Ladoris Canavan RN                      Time: 2819

## 2021-11-22 NOTE — PROGRESS NOTES
Speech Language Pathology  Speech Language Pathology  [facility] Unit at [facility]    Cognitive Treatment Note    Date: 11/22/2021  Patients Name: Jill Harrell  MRN: [id number]  Diagnosis:   Patient Active Problem List   Diagnosis Code    Atherosclerosis of coronary artery bypass graft of native heart without angina pectoris I25.810    Acute renal failure ([facility] [address] N17.9    Diabetes mellitus due to underlying condition with diabetic nephropathy, with long-term current use of insulin ([facility]) E08.21, Z79.4    Chronic diastolic heart failure ([facility]) I50.32    Diabetic polyneuropathy associated with type 2 diabetes mellitus ([facility]) E11.42    History of coronary artery bypass graft Z95.1    Iron deficiency anemia D50.9    Spinal stenosis of lumbar region with neurogenic claudication M48.062    Mixed hyperlipidemia E78.2    Stage 4 chronic kidney disease ([facility]) N18.4    Type 2 diabetes mellitus with kidney complication, with long-term current use of insulin ([facility]) E11.29, Z79.4    Syncope and collapse R55    Obesity, Class II, BMI 35-39.9 E66.9    Thyroid nodule greater than or equal to 1 cm in diameter incidentally noted on imaging study E04.1    Essential hypertension I10    Anemia in stage 4 chronic kidney disease ([facility]) N18.4, D63.1    Chronic ischemic heart disease I25.9    Ischemic stroke of frontal lobe ([facility]) I63.9    Stroke-like symptoms R29.90    Morbid obesity with BMI of 40.0-44.9, adult ([facility]) E66.01, Z68.41    Acute encephalopathy G93.40    Disequilibrium syndrome E87.8    Debility R53.81    Long-term memory loss R41.3    Muscle right arm weakness M62.81    Anxiety F41.9    Chronic midline low back pain with bilateral sciatica M54.41, M54.42, G89.29    Need for immunization against influenza Z23    Altered mental status R41.82       Pain: 0/10    Cognitive Treatment    Treatment time: 0835-3931      Subjective: [x] Alert [x] Cooperative     [] Confused     [] Agitated    [] Lethargic  Objective/Assessment:  Attention: functional     Recall:   Pt. Recalling staff members names and specific details re: her upcoming appt. Pt. Utilizes calendar in phone and continues to write in journal.      Organization: NA    Problem Solving/Reasoning:   Word problems using chart- 50%, 100% c cues x1, 57%, 100% c cues x1. Other:  Pt. Continues to complete homework worksheets given to her by . Plan:  [x] Continue  services    [] Discharge from :      Discharge recommendations: []  Further therapy recommended at discharge. The patient should be able to tolerate at least 3 hours of therapy per day over 5 days or 15 hours over 7 days. [] Further therapy recommended at discharge. [] No therapy recommended at discharge. Treatment completed by: Kat Leon A.CCC/SLP

## 2021-11-22 NOTE — PROGRESS NOTES
Physical Therapy  Facility/Department: [facility] MED SURG  Daily Treatment Note  NAME: Sunny Rivera  : 1960  MRN: [id number]    Date of Service: 2021    Discharge Recommendations:  Patient would benefit from continued therapy after discharge   PT Equipment Recommendations  Equipment Needed:  (TBD)    Assessment   Body structures, Functions, Activity limitations: Decreased functional mobility ; Decreased strength; Decreased cognition; Decreased endurance; Decreased sensation; Decreased balance; Decreased coordination; Decreased posture; Increased pain; Decreased ROM  Assessment:  Pt with recent left frontal stroke with residual mild right-sided weakness, pt is a Hemodialysis pt. Presents with impaired cognition, increased Right side weakness and sensation deficits. Pt requiring SBA for bed mobility, and CGA for transfers and short distance gait 30-40' on both level tile and carpeting. pt is motivated and has good home support. pt would benefit from continued PT services to address deficits of strength, coordination, balance, posture, and overall functional mobility to allow for a safe return home. Treatment Diagnosis: impaired mobility 2* CVA  Specific instructions for Next Treatment: 1 Step negotiation, RLE strengthening, dynamic balance training, Endurance training  Prognosis: Good  Decision Making: Medium Complexity  History: Hemodialysis pt, Recent CVA, Chronic back/neck pain. Exam: ROM, MMT, Balance and functional mobility assessments. PASS assessment  Clinical Presentation: pt is pleasant and cooperative throughout.    Barriers to Learning: cognition/memory deficits  REQUIRES PT FOLLOW UP: Yes  Activity Tolerance  Activity Tolerance: Patient Tolerated treatment well  Other Comments  Comments: Treatment cut short d/t transport arriving to take patient to MRI      Patient Diagnosis(es): Diagnoses of Spinal stenosis of lumbar region with neurogenic claudication, Chronic midline low back pain with bilateral sciatica, Chronic fatigue, and Other iron deficiency anemia were pertinent to this visit. has a past medical history of Backache, unspecified, CHF (congestive heart failure) ([address] Chronic kidney disease, Coronary atherosclerosis of artery bypass graft, Cramp of limb, Gallstones, Hypertension, Insomnia, Pneumonia, Type II or unspecified type diabetes mellitus with renal manifestations, not stated as uncontrolled(250.40), Type II or unspecified type diabetes mellitus without mention of complication, not stated as uncontrolled, and Unspecified vitamin D deficiency. has a past surgical history that includes Coronary artery bypass graft; Knee arthroscopy; Carpal tunnel release; Breast surgery; Tonsillectomy; Hand surgery; Ankle fracture surgery; Cholecystectomy, open (N/A); and IR TUNNELED CVC PLACE WO SQ PORT/PUMP > 5 YEARS (8/18/2021). Restrictions  Restrictions/Precautions  Restrictions/Precautions: Contact Precautions, Fall Risk  Required Braces or Orthoses?: Yes  Implants present? :  (pt denies)  Position Activity Restriction  Other position/activity restrictions: Hemodialysis MWF     Subjective   General  Chart Reviewed: Yes  Additional Pertinent Hx: This is a 59 y.o. female with a significant past medical history of Chronic atrial fibrillation on Eliquis, Type 2 diabetes mellitus, essential systemic hypertension, coronary artery disease s/p CABG in 2004 and PTCA in 2019, heart failure with preserved ejection fraction LVEF 55% and end-stage renal disease secondary to diabetic and hypertensive nephropathy on routine hemodialysis on a Monday/Wednesday/Friday schedule at [address] [place] [address] dialysis unit on [facility] under the care of Dr. Mela Fox since August 2001 using IJ tunneled dialysis catheter, who presented to the hospital for further evaluation of acute on chronic right-sided weakness, Slurred speech and confusion.   She was initially diagnosed with acute/subacute stroke in the left frontal lobe with right-sided weakness 4 months ago. Neurology consulted , MRI brain pending. Response To Previous Treatment: Patient with no complaints from previous session. Family / Caregiver Present: No  Referring Practitioner: Dr. Daisy Dobbs: Pt is excited about returning home tomorrow. Pain Screening  Patient Currently in Pain: Denies  Pain Assessment  Response to Pain Intervention: Patient Satisfied  Vital Signs  Patient Currently in Pain: Denies  Oxygen Therapy  O2 Device: None (Room air)  Patient Observation  Observations: calm and cooperative       Orientation  Orientation  Overall Orientation Status: Within Functional Limits    Objective   Bed mobility  Rolling to Left: Independent  Rolling to Right: Independent  Supine to Sit: Independent  Sit to Supine: Independent  Transfers  Sit to Stand: Stand by assistance  Stand to sit: Stand by assistance  Bed to Chair: Stand by assistance  Stand Pivot Transfers: Stand by assistance  Comment: uses rollator or RW  Ambulation  Ambulation?: Yes  Ambulation 1  Surface: level tile  Device: Rollator  Assistance: Modified Independent  Gait Deviations: Slow Annie; Decreased step length; Decreased step height  Distance: 75ft, 90ft  Ambulation 2  Surface - 2: ramp  Device 2: Rollator  Assistance 2: Modified Independent  Gait Deviations: Slow Annie; Decreased step length; Decreased step height  Distance: incline: 65ft  ; decline: 65ft (using rollator brakes to set her pace)     Balance  Posture: Good  Sitting - Static: Good  Sitting - Dynamic: Fair; +  Standing - Static: Good  Standing - Dynamic: Fair  Comments: Standing balance assessed with RW or rollator  Other exercises  Other exercises?: Yes  Other exercises 1: Seated bilateral LE exercises, x15, R LE 1#, L LE 2#, lime (minimal+) resistance band     (in AM and PM)  Other exercises 5: NuStep: 15 min, workload 4 (seat: 9 ; hands: 9)  Other exercises 8: Standing BLE ex's in // bars. Reps: 10-15 each.   Other exercises 9: UBE: 5 min FWD, 5 min BWD  Other exercises 10: Balance activities for PASS score         Comment: rest breaks PRN. OutComes Score  Postural Assessment Scale for Stroke Patients   (PASS) Scoring Form     Give the subject instructions for each item as written below. When scoring the item, record the lowest response category that applies for each item. Maintaining a Posture  1. Sitting Without Support  Examiner: Have the subject sit on a bench/mat without support and with feet flat on the floor. 3 Can sit for 5 minutes without support  2. Standing with Support Examiner: Have the subject stand, providing support as needed. Evaluate only the ability to stand with or without support. Do not consider the quality of the stance. 3     Can stand with support of only 1 hand  3. Standing Without Support  Examiner:  Have the subject stand without support. Evaluate only the ability to stand with or without support. Do not consider the quality of the stance. 2  Can stand without support for 1 minute or stands slightly asymmetrically   4. Standing on Non-paretic Leg  Examiner: Have the subject stand on the non-paretic leg. Evaluate only the ability to bear weight entirely on the non-paretic leg. Do not consider how the subject accomplishes the task. 3  Can stand on non-paretic leg for more than 10 seconds   5. Standing on Paretic Leg  Examiner: Have the subject stand on the paretic leg. Evaluate only the ability to bear weight entirely on the paretic leg. Do not consider how the subject accomplishes the task. 3  Can stand on paretic leg for more than 10 seconds    Maintaining Posture SUBTOTAL     14/15    Changing a Posture  6. Supine to Paretic Side Lateral  Examiner:  Begin with the subject in supine on a treatment mat. Instruct the subject to roll to the paretic side (lateral movement). Assist as necessary. Evaluated the subject's performance on the amount of help required. Do not consider the quality of performance. 3  Can perform without help  7. Supine to Non-paretic Side Lateral  Examiner:  Begin with the subject in supine on a treatment mat. Instruct the subject to roll to the non-paretic side (lateral movement). Assist as necessary. Evaluate the subject's Performance on the amount of help required. Do not consider the quality of performance. 3  Can perform without help  8. Supine to Sitting up on the Edge of the Mat   Examiner:  Begin with the subject in supine on a treatment mat. Instruct the subject to come to sitting on the edge of the mat. Assist as necessary. Evaluate the subject's performance on the amount of help required. Do not considered the quality performance. 3  Can perform without help   9. Sitting on the Edge of the Mat to Supine  Examiner: Begin with the subject sitting on the edge of a treatment mat. Instruct the subject to return to supine. Assist as necessary. Evaluate the subjects performance on the amount of help required. Co not consider the quality of performance. 3  Can perform without help  10. Sitting to Standing Up  Examiner:  Begin with the subject sitting on the edge of a treatment mat. Instruct the subject to stand up without support. Assist if necessary. Evaluated the subject's performance on the amount ot help required. Do not consider the quality of the performance. 3  Can perform without help  11. Standing Up to Sitting Down  Examiner:  Begin with the subject standing by the edge of a treatment mat. Instruct the subject to sit on the edge of mat without support. Assist if necessary. Evaluated the subject's performance on the amount of help required. Do not consider the quality of the performance. 3  Can perform without help  12 . Standing, Picking up a Pencil from the Floor  Examiner:  Begin with the subject standing. Instruct the subject to  a pencil from the floor without support.   Assist if necessary. Evaluate the subject's performance on the amount of help required. Do Not consider the quality of the performance.    3  Can perform without help    Changing Posture SUBTOTAL   21/21    PASS TOTAL   35/36     Goals  Short term goals  Time Frame for Short term goals: 5 days  Short term goal 1: pt to demo all bed mobility with supervision to decrease burden of care  Short term goal 2: pt to demo all transfers with SBA using RW  Short term goal 3: pt to amb 48' with 2 turns using RW and SBA  Short term goal 4: pt to negotiate at least 1 step with UE support and SBA to allow for safe access into home shower  Short term goal 5: Improve R LE strengthto 2 to 2+/5 to improve fucntion  Long term goals  Time Frame for Long term goals : Until D/C  Long term goal 1: pt to demo all bed mobility independently to decrease burden of care  Long term goal 2: pt to demo all transfers with Mod I with RW  Long term goal 3: pt to amb 125' with RW on even surfaces and at least 10' uneven ramped surfaces with RW demonstrating Mod I   Long term goal 4: pt to demo improved RLE strength by 1/2 manual muscle grade to improve safety with functional mobility   Long term goal 5: pt to improve dynamic standing balance to fair + to improve safety with functional mobility   Long term goal 6: pt to improve PASS score to 30/36 to demo improved balance and safety with mobility   Patient Goals   Patient goals : to get stronger    Plan    Plan  Times per week: 900 minutes for combined PT/OT/ST therapies 2* HemoDialysis  Plan weeks: 7-10 days  Specific instructions for Next Treatment: 1 Step negotiation, RLE strengthening, dynamic balance training, Endurance training  Current Treatment Recommendations: Strengthening, Balance Training, Functional Mobility Training, Transfer Training, Endurance Training, Gait Training, Home Exercise Program, Safety Education & Training, Patient/Caregiver Education & Training  Safety Devices  Type of devices:

## 2021-11-22 NOTE — PROGRESS NOTES
HEMODIALYSIS POST TREATMENT NOTE    Treatment time ordered: 3 Hours    Actual treatment time: 3 Hours    UltraFiltration Goal: 2000 ml  UltraFiltration Removed: 2000 ml      Pre Treatment weight: 110.9 kg  Post Treatment weight: 108.9 kg  Estimated Dry Weight:     Access used:     Central Venous Catheter:          Tunneled or Non-tunneled: Tunneled           Site: Right chest wall          Access Flow: Good      Internal Access:       AV Fistula or AV Graft:          Site:        Access Flow:        Sign and symptoms of infection:        If YES:     Medications or blood products given: None    Chronic outpatient schedule: [facility]    Chronic outpatient unit:  [facility]    Summary of response to treatment: Treatment ran uneventful. Patient tolerated well. No interventions required for BP support. Target fluid goal met without discomfort. Vitals stable post treatment. No concerns to report. Explain if orders NOT met, was physician notified:      Amara Estrada faxed to patient unit/ placed in patient chart: Yes    Post assessment completed: Yes    Report given to: Zoran Walker RN      * Intra-treatment documented Safety Checks include the followin) Access and face visible at all times. 2) All connections and blood lines are secure with no kinks. 3) NVL alarm engaged. 4) Hemosafe device applied (if applicable). 5) No collapse of Arterial or Venous blood chambers. 6) All blood lines / pump segments in the air detectors.

## 2021-11-22 NOTE — PATIENT CARE CONFERENCE
[address]   ACUTE REHABILITATION  TEAM CONFERENCE NOTE  Date: 21  Patient Name: Madelyn Munoz       Room: 7884/6692-43  MRN: [id number]       : 1960  (59 y.o.)     Gender: female   Referring Practitioner: Dr. Twyla May   Stroke-like symptoms [R29.90]  Diagnosis: Stroke-Like Symptoms     NURSING  Bladder  Always Continent  Bowel   Always Continent  Date of Last BM: 2021  Intervention    Both Bowel & Bladder Program     Wounds/Incisions/Ulcers: Wounds present on -    Left Medial Great Toe Trauma  Left Anterior Distal Thigh Skin Tear    Medication Education Program: Patient able to manage medications and being educated by nursing  Pain: Patient's pain is currently controlled with -     Acetaminophen 650 mg Q 4 PRN  Gabapentin 300 mg twice daily  Fall Risk:  Falling star program initiated    PHYSICAL THERAPY  Bed mobility  Rolling to Left: Independent  Rolling to Right: Independent  Supine to Sit: Independent  Sit to Supine: Independent    Transfers:  Sit to Stand: Stand by assistance  Stand to sit: Stand by assistance  Bed to Chair: Stand by assistance  Comment: rest breaks PRN. Ambulation 1  Surface: level tile  Device: Rollator  Assistance: Modified Independent  Gait Deviations: Slow Annie; Decreased step length; Decreased step height  Distance: 75ft, 90ft  Ambulation 2  Surface - 2: ramp  Device 2: Rollator  Assistance 2: Modified Independent  Gait Deviations: Slow Annie; Decreased step length; Decreased step height  Distance: incline: 65ft  ; decline: 65ft (using rollator brakes to set her pace)          Equipment Needed: Has DME form previous admissions.      Goals  Time Frame for Short term goals: 5 days  Short term goal 1: pt to demo all bed mobility with supervision to decrease burden of care  Short term goal 2: pt to demo all transfers with SBA using RW  Short term goal 3: pt to amb 48' with 2 turns using RW and SBA  Short term goal 4: pt to negotiate at least 1 step with UE support and SBA to allow for safe access into home shower  Short term goal 5: Improve R LE strengthto 2 to 2+/5 to improve fucntion      OCCUPATIONAL THERAPY  SELF CARE      Eating   6  Independent     Modified independent    Oral Hygiene   6  Assistance Needed: Independent     Modified independent    Shower/Bathe Self   5  Assistance Needed: Setup or clean-up assistance      UE Bathing: Setup  LE Bathing: Setup   Upper Body Dressing   5  Assistance Needed: Setup or clean-up assistance     Setup   Lower Body Dressing   5  Assistance Needed: Setup or clean-up assistance     Putting On/Taking Off Footwear   3  Assistance Needed: Partial/moderate assistance  Assist with B edema compression sleeves/wraps   Minimal assistance (Assist to maria de jesus socks/shoes this date due to fatigue. )   Toilet Transfer   4  Assistance Needed: Supervision or touching assistance      Toilet - Technique: Ambulating  Equipment Used: Standard toilet  Toilet Transfer: Supervision  Toilet Transfers Comments: KARINA   Toileting Hygiene   4 Assistance Needed: Supervision or touching assistance      Supervision             Balance  Sitting Balance: Independent  Standing Balance: Supervision  Standing Balance  Time: 30 sec  Activity: short functional mobility back to chair  Comment: lamar COLLINS    Equipment Recommendations  Equipment Needed: Yes       Short term goals  Time Frame for Short term goals: By 4-5 days  Short term goal 1: Pt will complete lower body dressing/bathing with CGA and good safety with use of AE as needed  Short term goal 2: Pt will complete upper body dressing with set-up assistance and good safety/attention to task  Short term goal 3: Pt will complete functional transfers/mobility during self care tasks with SBA and good safety with use of least restrictive device  Short term goal 4: Pt will tolerate standing 5+ minutes during functional activity of choice with good safety  Short term goal 5: Pt will participate in 30+ 100%   Readmission Risk              Risk of Unplanned Readmission:  38       %    Critical Items: None        Problem / Barrier Intervention / Plan  Results   Impaired recall Memory retraining      Impaired mobility related to weakness, balance and coordination deficts Strengthening, balance and coordination ex's, fucntional mobility training     Altered ADL status  Training in [facility] care and organizational strategies to support safe performance of Basic ADLs                                    Total Self Care Score    Total Mobility Score  Admission Score:  24      Admission Score:  42  Goal:  42/42         Goal:  74/90   `  Discharge Plan   Estimated Discharge Date: 11/23/2021  Home evaluation needed?  Home Evaluation Indication (NO, Requires ReEval, YES/Date): No home evaluation need indicated for patient at this time  Overnight or Day Pass: No  Factors facilitating achievement of predicted outcomes: Family support, Motivated, Cooperative and Pleasant  Barriers to the achievement of predicted outcomes: Pain, Cognitive deficit, Decreased endurance, Upper extremity weakness, Lower extremity weakness, Skin Care and Medication managment    Functional Goals at discharge:  Predicted Outcome: Home with familyPATIENT'S LEVEL OF ASSISTANCE: Modified independence and Close Supervision  Discharge therapy goals:  PT: Long term goals  Time Frame for Long term goals : Until D/C  Long term goal 1: pt to demo all bed mobility independently to decrease burden of care  Long term goal 2: pt to demo all transfers with Mod I with RW  Long term goal 3: pt to amb 125' with RW on even surfaces and at least 10' uneven ramped surfaces with RW demonstrating Mod I   Long term goal 4: pt to demo improved RLE strength by 1/2 manual muscle grade to improve safety with functional mobility   Long term goal 5: pt to improve dynamic standing balance to fair + to improve safety with functional mobility   Long term goal 6: pt to improve PASS score to 30/36 to demo improved balance and safety with mobility   OT:Long term goals  Time Frame for Long term goals : By discharge  Long term goal 1: Pt will complete BADLs with modified independence and good safety with use of AE as needed. Long term goal 2: Pt will complete functional transfers/mobility during self care tasks with modified independence and good safety with use of least restrictive device  Long term goal 3: Pt will tolerate standing 10+ minutes during functional activity of choice with good safety  Long term goal 4: Pt will verbalize/demonstrate good understanding of home safety/fall prevention strategies to increase safety and independence with self care and mobiliyt  Long term goal 5: Pt will complete simple meal prep/light house keeping task with supervision and good safety   ST: mod I    Team Members Present at Conference:  /:  Aubrey Augustin [facility]  Occupational Therapist: José Garcia OT    Physical Therapist: Valeria  PT  Speech Therapist: Tasha WEISSCCC/SLP  Nurse: Pool Encarnacion RN   Dietary/Nutrition: Karla Thornton RD, LD  Pastoral Care: [facility]  CMG: Wilmer Llanos RN    I approve the established interdisciplinary plan of care as documented within the medical record of Lyndsay Archer. Nirmala Wall.  Lakshmi Freeman MD

## 2021-11-22 NOTE — PLAN OF CARE
Nutrition Problem #1: Altered nutrition-related lab values  Intervention: Food and/or Nutrient Delivery: Continue Current Diet  Nutritional Goals: PO intake % of most meals

## 2021-11-22 NOTE — PROGRESS NOTES
[address] [facility]    ACUTE REHABILITATION OCCUPATIONAL THERAPY  DAILY NOTE    Date: 21  Patient Name: Madelyn Munoz      Room: 2574/8545-54    MRN: [id number]   : 1960  (59 y.o.)  Gender: female   Referring Practitioner: Irvin Merlos MD  Diagnosis: Stroke-Like Symptoms  Additional Pertinent Hx: This is a 59 y.o. female with a significant past medical history of Chronic atrial fibrillation on Eliquis, Type 2 diabetes mellitus, essential systemic hypertension, coronary artery disease s/p CABG in 2004 and PTCA in 2019, heart failure with preserved ejection fraction LVEF 55% and end-stage renal disease secondary to diabetic and hypertensive nephropathy on routine hemodialysis on a Monday/Wednesday/Friday schedule at [address] dialysis unit on [facility] under the care of Dr. Maldonado Leonard since 2001 using IJ tunneled dialysis catheter, who presented to the hospital for further evaluation of acute on chronic right-sided weakness, Slurred speech and confusion. She was initially diagnosed with acute/subacute stroke in the left frontal lobe with right-sided weakness 4 months ago. Neurology consulted , MRI brain pending. Restrictions  Restrictions/Precautions: Contact Precautions, Fall Risk  Implants present? :  (pt denies)  Other position/activity restrictions: Hemodialysis MWF  Required Braces or Orthoses?: Yes    Subjective  Comments: Pt pleasant and motivated. Patient Currently in Pain: Yes  Pain Level: 4  Pain Location: Neck  Pain Orientation: Posterior  Restrictions/Precautions: Contact Precautions;  Fall Risk  Overall Orientation Status: Impaired  Orientation Level: Oriented X4 (Requires use of communication board for date. )     Pain Assessment  Pain Assessment: 0-10  Pain Level: 4  Pain Type: Acute pain  Pain Location: Neck  Pain Orientation: Posterior  Pain Descriptors: Aching    Objective  Cognition  Overall Cognitive Status: Impaired  Memory: Decreased long term memory  Perception  Overall Perceptual Status: WFL  Balance  Sitting Balance: Independent  Standing Balance: Supervision  Bed mobility  Supine to Sit: Modified independent  Transfers  Sit to stand: Modified independent  Stand to sit: Modified independent  Standing Balance  Time: 1-2 min trials  Activity: self care, mobility in room   Functional Mobility  Functional - Mobility Device: Rolling Walker  Activity: To/from bathroom; Transport items; Retrieve items  Assist Level: Supervision  Toilet Transfers  Toilet - Technique: Ambulating  Equipment Used: Standard toilet (with bilateral toilet rails)  Toilet Transfer: Supervision  Toilet Transfers Comments: RW     ADL  Feeding: Modified independent   Grooming: Modified independent   UE Bathing: Setup  LE Bathing: Setup  UE Dressing: Setup  LE Dressing: Setup  Toileting: Supervision        Assessment  Performance deficits / Impairments: Decreased ADL status; Decreased functional mobility ; Decreased strength; Decreased safe awareness; Decreased cognition; Decreased endurance; Decreased sensation; Decreased high-level IADLs; Decreased coordination  Prognosis: Good  Discharge Recommendations: Patient would benefit from continued therapy after discharge  Activity Tolerance: Patient Tolerated treatment well; Patient limited by fatigue              Plan  Plan  Times per week: 900 minutes combined between OT/PT/SLP   Times per day: Twice a day  Current Treatment Recommendations: Self-Care / ADL, Strengthening, ROM, Balance Training, Functional Mobility Training, Endurance Training, Cognitive Reorientation, Safety Education & Training, Patient/Caregiver Education & Training, Equipment Evaluation, Education, & procurement, Home Management Training, Cognitive/Perceptual Training  Patient Goals   Patient goals : \"I would like to get my hand and right arm going. My memory back.  Just being able to walk around and get my right leg going\"  Short term goals  Time Frame for Short term goals: By 4-5 days  Short term goal 1: Pt will complete lower body dressing/bathing with CGA and good safety with use of AE as needed  Short term goal 2: Pt will complete upper body dressing with set-up assistance and good safety/attention to task  Short term goal 3: Pt will complete functional transfers/mobility during self care tasks with SBA and good safety with use of least restrictive device  Short term goal 4: Pt will tolerate standing 5+ minutes during functional activity of choice with good safety  Short term goal 5: Pt will participate in 30+ minutes of therapeutic exercises/functional activities to increase safety and independence with self care and mobility  Long term goals  Time Frame for Long term goals : By discharge  Long term goal 1: Pt will complete BADLs with modified independence and good safety with use of AE as needed.    Long term goal 2: Pt will complete functional transfers/mobility during self care tasks with modified independence and good safety with use of least restrictive device  Long term goal 3: Pt will tolerate standing 10+ minutes during functional activity of choice with good safety  Long term goal 4: Pt will verbalize/demonstrate good understanding of home safety/fall prevention strategies to increase safety and independence with self care and mobiliyt  Long term goal 5: Pt will complete simple meal prep/light house keeping task with supervision and good safety         11/22/21 1027   OT Individual Minutes   Time In 0910   Time Out 0946   Minutes 36     Electronically signed by DAYANA Scherer on 11/22/21 at 2:00 PM EST

## 2021-11-22 NOTE — DISCHARGE INSTR - DIET

## 2021-11-22 NOTE — PROGRESS NOTES
Physical Medicine & Rehabilitation  Progress Note    11/22/2021 10:27 AM     CC: Ambulatory and ADL dysfunction due tor ecrudescence of right-sided and cognitive stroke symptoms    Subjective:   No complaints. Feels well. ROS:  Denies fevers, chills, sweats. No chest pain, palpitations, lightheadedness. Denies coughing, wheezing or shortness of breath. Denies abdominal pain, nausea, diarrhea or constipation. No new areas of joint pain. Denies new areas of numbness or weakness. Denies new anxiety or depression issues. No new skin problems. Rehabilitation:   PT:  Restrictions/Precautions: Contact Precautions, Fall Risk  Implants present? :  (pt denies)  Other position/activity restrictions: Hemodialysis MWF  Required Braces or Orthoses  Right Lower Extremity Brace:  (-)  Left Lower Extremity Brace:  (-)   Transfers  Sit to Stand: Stand by assistance  Stand to sit: Stand by assistance  Bed to Chair: Stand by assistance  Stand Pivot Transfers: Stand by assistance  Comment: uses rollator or RW  Ambulation 1  Surface: level tile  Device: Rollator  Other Apparatus: Wheelchair follow  Assistance: Stand by assistance  Quality of Gait: Pt demos a decrease steady gait. Minimal R foot clearance of floor but demonstrating fair heel strike. Gait Deviations: Slow Annie, Decreased step length, Decreased step height  Distance: 115 feet, 115 feet, 170 feet  Comments: Pt requires a seated rest break between amb. OT:  ADL  Feeding: Modified independent   Grooming: Modified independent   UE Bathing: Setup  LE Bathing: Setup  UE Dressing: Setup  LE Dressing: Setup  Toileting: Supervision  Additional Comments: Assist to maria de jesus lotion to feet. Pt able to reach to ankles.           Balance  Sitting Balance: Independent  Standing Balance: Supervision   Standing Balance  Time: 1-2 min trials  Activity: self care, mobility in room   Comment: with RW, reports prior limited stand ilana  Functional Mobility  Functional - Mobility Device: Rolling Walker  Activity: To/from bathroom, Transport items, Retrieve items  Assist Level: Supervision  Functional Mobility Comments: Verbal cues for hand placement and safety     Bed mobility  Bridging: Contact guard assistance (Writer to assist holding RLE in place. )  Rolling to Left: Supervision  Rolling to Right: Stand by assistance  Supine to Sit: Modified independent  Sit to Supine: Stand by assistance (Assist level reported by Speech Therapist)  Scooting: Minimal assistance (Bracing R foot to scoot HOB; Pt using rails, bridging)  Comment: Pt performs bed mobility with HOB slightly elevated and minimal use of bed rails. Transfers  Stand Step Transfers: Supervision  Stand Pivot Transfers: Supervision  Sit to stand: Modified independent  Stand to sit: Modified independent  Transfer Comments: Verbal cues for hand placement and safety   Toilet Transfers  Toilet - Technique: Ambulating  Equipment Used: Standard toilet (with bilateral toilet rails)  Toilet Transfer: Supervision  Toilet Transfers Comments: RW     Shower Transfers  Shower - Transfer To: Shower seat with back  Shower - Technique: Ambulating  Shower Transfers: Minimal assistance  Shower Transfers Comments: OT assisted pt in backing up to shower with RW and utilizing grab bars to assist with back steping over threshold and sitting on shower chair. Pt demonstrated good safety with increased time to complete shower transfer. ST:    Subjective: [x]? Alert     [x]? Cooperative     []? Confused     []? Agitated    []? Lethargic  Objective/Assessment:  Attention: sustained throughout, distractions in room minimized.      Orientation: x4 anthony     Recall:   Continued pt ed provided during session re use of external memory aide (memory book). Pt again  Observed using memory book to assist in recall of information provided by physician during session. Pt utilizing memory book appropriately.  Pt also observed utilizing Pharmacy [facility] darrin in phone to assist in recall of functional information (upcoming appointments).     5 unit word list recall: 80% accuracy anthony, no increase with max cues provided. Pt verbalized understanding of strategy but demonstrating difficulty implementing strategy into task.      Organization: NA     Problem Solving/Reasoning:    Divergent thinking:   categorization grid completed with mod assist. Pt demonstrated difficulty understanding direction of task. Fill in the blank (category provided)-Pt completed task with 90% accuracy anthony      Other:  Writing: Pt spelling target words in isolation with 100% accuracy (during categorization grid task)        Objective:  /74   Pulse 66   Temp 97.5 °F (36.4 °C)   Resp 18   Ht 5' 5\" (1.651 m)   Wt 242 lb 15.2 oz (110.2 kg)   SpO2 92%   BMI 40.43 kg/m²  I Body mass index is 40.43 kg/m². I   Wt Readings from Last 1 Encounters:   21 242 lb 15.2 oz (110.2 kg)      Temp (24hrs), Av.6 °F (36.4 °C), Min:97.5 °F (36.4 °C), Max:97.7 °F (36.5 °C)         GEN: well developed, well nourished, no acute distress  HEENT: Normocephalic atraumatic, EOMI, mucous membranes pink and moist  CV: RRR, no murmurs, rubs or gallops  PULM: CTAB, no rales or rhonchi. Respirations WNL and unlabored  ABD: soft, NT, ND, +BS and equal  NEURO: A&O x3. Sensation intact to light touch. MSK: 4+5 upper and lower extremities  EXTREMITIES: No calf tenderness to palpation bilaterally. Lymphedema bilateral lower extremities  SKIN: warm dry and intact with good turgor right medial great toe darkened area half by half centimeter, no erythema  PSYCH: appropriately interactive. Affect WNL.   Good spirits        Medications   Scheduled Meds:   polyethylene glycol  17 g Oral Daily    insulin glargine  73 Units SubCUTAneous BID    busPIRone  10 mg Oral TID    insulin lispro  10 Units SubCUTAneous TID WC    [Held by provider] apixaban  5 mg Oral BID    [Held by provider] aspirin  81 mg Oral Daily    atorvastatin  80 mg Oral Daily    carvedilol  6.25 mg Oral BID    docusate sodium  100 mg Oral BID    febuxostat  40 mg Oral Daily    ferrous sulfate  325 mg Oral BID WC    furosemide  80 mg Oral BID    gabapentin  300 mg Oral BID    insulin lispro  0-18 Units SubCUTAneous TID WC    insulin lispro  0-9 Units SubCUTAneous Nightly    pantoprazole  40 mg Oral QAM AC    ranolazine  1,000 mg Oral BID    tamsulosin  0.4 mg Oral Daily    traZODone  75 mg Oral Nightly     Continuous Infusions:   sodium chloride      dextrose       PRN Meds:.sodium citrate **AND** sodium citrate, anticoagulant sodium citrate **AND** anticoagulant sodium citrate, sodium chloride, ondansetron **OR** [DISCONTINUED] ondansetron, dextrose, dextrose, glucagon (rDNA), glucose, lactulose, sodium chloride flush, acetaminophen, senna, bisacodyl     Diagnostics:     CBC: No results for input(s): WBC, RBC, HGB, HCT, MCV, RDW, PLT in the last 72 hours. BMP:   Recent Labs     11/21/21  0654 11/22/21  0701    139   K 4.9 4.7    102   CO2 26 24   BUN 45* 56*   CREATININE 3.13* 3.42*     BNP: No results for input(s): BNP in the last 72 hours. PT/INR: No results for input(s): PROTIME, INR in the last 72 hours. APTT: No results for input(s): APTT in the last 72 hours. CARDIAC ENZYMES: No results for input(s): CKMB, CKMBINDEX, TROPONINT in the last 72 hours. Invalid input(s): CKTOTAL;3  FASTING LIPID PANEL:  Lab Results   Component Value Date    CHOL 105 04/09/2015    HDL 43 04/09/2015    TRIG 168 04/09/2015     LIVER PROFILE: No results for input(s): AST, ALT, ALB, BILIDIR, BILITOT, ALKPHOS in the last 72 hours. I/O (24Hr):   No intake or output data in the 24 hours ending 11/22/21 1027    Glu last 24 hour  Recent Labs     11/21/21  1043 11/21/21  1637 11/21/21 2015 11/22/21  0701   POCGLU 261* 189* 267* 175*       No results for input(s): CLARITYU, COLORU, PHUR, SPECGRAV, PROTEINU, RBCUA, BLOODU, BACTERIA, NITRU, 45 Chantel Alejanrda, LEUKOCYTESUR, YEAST, Judy Slay in the last 72 hours. X-ray left foot 11/19  Impression:     1. Diffuse soft tissue swelling along the left foot without evidence of an   acute fracture or osteomyelitis. Impression/Plan:    1. Recrudescence of right-sided and cognitive stroke symptoms:  PT/OT for gait, mobility, strengthening, endurance, ADLs, and self care.  SLP for cognition. 2. CVA-on aspirin (on hold as below), eliquis (on hold as below), and atorvastatin.  Follow up with neurology. 3. Anemia:  Hemoglobin 11.7 on 11/17, stable.  Monitoring.  On iron supplementation. 4. Eschar on left great toe:  Wound care following. Podiatry consulted by IM - ordered x-ray of the left foot-as above, podiatry evaluation appreciated-nonsurgical, can follow-up as outpatient. No change  5. AURELIA on CKD:  On hemodialysis MWF.  Nephrology following. Dialysis today, AV fistula placement Tuesday 9/23 at 10:30 AM,  Eliquis and aspirin on hold for procedure per instructions patient was given. NPO at midnight on 11/23.-Discussed with Dr. Brooksie Landau, notes aspirin Eliquis already on hold, n.p.o. midnight, sips of meds and can take carvedilol in a.m., insulin to be held morning 11/23, resumption of medications per surgeon post procedure-nursing reviewed with surgeon  6. CHF, HTN:  On carvedilol, furosemide  7. Lymphedema bilateral lower extremity-has stockings  8. Type 2 diabetes with neuropathy:  On lantus BID - IM increased dose on 11/18, humalog TID + sliding scale.  Has gabapentin BID  9. Insomnia:  On trazodone nightly  10. Adjustment disorder with anxiety:  Psychiatry consulted in acute care - recommended titration of buspar to 10mg TID, discussed with patient and increased the dose 11/17; also recommended consideration of an acetylcholinesterase inhibitor - will hold off for now, as patient is doing well with speech therapy and utilizing strategies to help with memory. Recommend outpatient counseling. Patient would like BuSpar decreased back to 7.5 3 times daily as she was on at home on discharge. 11. Gout:  On febuxostat  12. Obesity:  BMI 41.02  13. Bowel Management: Docusate BID, miralax daily, senokot prn, dulcolax prn. 14. DVT Prophylaxis:  On eliquis (on hold as above)  15. Internal Medicine for medical management   16. Discharge plan 11/22 AM if okay with family practice, patient to go for AV fistula-n.p.o. previous night, uppercase clarification of medications prior to procedure with family practice, await surgical input, follow-up with 1. PCP Willis KILLIAN 2. Nephrology 3. Surgeon 4. Rehab, 5. Podiatry Dr. Janell Lenz, 6. Psychiatry/counseling home PT/OT/Nsg, has been on Eliquis- patient resume aspirin/Eliquis when okay with surgeon. PerfectServe Dr. Salvador Cabrera regarding discharge medications  -Buspar/insulin/etc. okay with BuSpar back to 7.5 3 times a day and current insulin dose. No driving, CBC/BMP 1 week, monitor glucose before meals and at bedtime all above discussed with patient, prescription to [place] per patient request      Zion Lane. Essie Chino MD       This note is created with the assistance of a speech recognition program.  While intending to generate a document that actually reflects the content of the visit, the document can still have some errors including those of syntax and sound a like substitutions which may escape proof reading.   In such instances, actual meaning can be extrapolated by contextual diversion

## 2021-11-22 NOTE — PROGRESS NOTES
RW  Functional Mobility  Functional - Mobility Device: Rolling Walker  Activity: Other (across room to recliner)  Assist Level: Supervision  Functional Mobility Comments: Verbal cues for hand placement and safety     Exercises  Grasp/Release: 3rd setting spring gripper x20 B hands  Other: green theraflex bar upward/downward bends/twisting x 20 to address B gross motor control and strength. pt tolerates well. Left Hand Strength -  (lbs)  Handle Setting 2: 42# (42, 45, 41)        Right Hand Strength -  (lbs)  Handle Setting 2: 39# (37, 43, 37)        Fine Motor Skills  Left 9 Hole Peg Test Time (secs): 30  Right 9 Hole Peg Test Time (secs): 25               ADL  Additional Comments: Please see AM note for ADLs          Assessment  Performance deficits / Impairments: Decreased ADL status; Decreased functional mobility ; Decreased strength; Decreased safe awareness; Decreased cognition; Decreased endurance; Decreased sensation; Decreased high-level IADLs; Decreased coordination  Prognosis: Good  Discharge Recommendations: Patient would benefit from continued therapy after discharge  Activity Tolerance: Patient Tolerated treatment well  Safety Devices in place: Yes  Type of devices: Call light within reach; Left in chair           Patient Education: Ot POC, safety with functional mobility and use of RW, immportance of daily UB exercises upon d/c for optimal rehab. Patient Goals   Patient goals : \"I would like to get my hand and right arm going. My memory back.  Just being able to walk around and get my right leg going\"  Learner:patient  Method: explanation       Outcome: acknowledged understanding of         Plan  Plan  Times per week: 900 minutes combined between OT/PT/SLP   Times per day: Twice a day  Current Treatment Recommendations: Self-Care / ADL, Strengthening, ROM, Balance Training, Functional Mobility Training, Endurance Training, Cognitive Reorientation, Safety Education & Training, Patient/Caregiver Education & Training, Equipment Evaluation, Education, & procurement, Home Management Training, Cognitive/Perceptual Training  Patient Goals   Patient goals : \"I would like to get my hand and right arm going. My memory back. Just being able to walk around and get my right leg going\"  Short term goals  Time Frame for Short term goals: By 4-5 days  Short term goal 1: Pt will complete lower body dressing/bathing with CGA and good safety with use of AE as needed  Short term goal 2: Pt will complete upper body dressing with set-up assistance and good safety/attention to task  Short term goal 3: Pt will complete functional transfers/mobility during self care tasks with SBA and good safety with use of least restrictive device  Short term goal 4: Pt will tolerate standing 5+ minutes during functional activity of choice with good safety  Short term goal 5: Pt will participate in 30+ minutes of therapeutic exercises/functional activities to increase safety and independence with self care and mobility  Long term goals  Time Frame for Long term goals : By discharge  Long term goal 1: Pt will complete BADLs with modified independence and good safety with use of AE as needed.    Long term goal 2: Pt will complete functional transfers/mobility during self care tasks with modified independence and good safety with use of least restrictive device  Long term goal 3: Pt will tolerate standing 10+ minutes during functional activity of choice with good safety  Long term goal 4: Pt will verbalize/demonstrate good understanding of home safety/fall prevention strategies to increase safety and independence with self care and mobiliyt  Long term goal 5: Pt will complete simple meal prep/light house keeping task with supervision and good safety         11/22/21 1446   OT Individual Minutes   Time In 0501   Time Out 1402   Minutes 28     Electronically signed by DAYANA Augustin on 11/22/21 at 2:47 PM EST

## 2021-11-22 NOTE — PROGRESS NOTES
Spoke with Mitul Monsivais from Dr. Francine Magaña office. Patient is to arrive at 0900 tomorrow for 1030 surgery. Patient is to be NPO after midnight tonight. Meds may be taken tomorrow morning with small sips of water. Insulins are to be held the day of surgery and everything may be resumed after surgery.

## 2021-11-22 NOTE — PROGRESS NOTES
Department of Internal Medicine  Nephrology [facility], MD Progress Note    Reason for consultation: Management of end-stage renal disease. Consulting physician: Alessia Munoz MD.    Interval history: Patient was seen and examined today during dialysis and she feels well with no shortness of breath or chest pain. She is scheduled to have AV fistula creation by Dr. Lico Markham of [facility] [place] tomorrow  11/23/2021. She was transferred to the acute rehabilitation unit on 11/16/2021. History of present illness: This is a 59 y.o. female with a significant past medical history of Chronic atrial fibrillation [on Eliquis], Type 2 diabetes mellitus, essential systemic hypertension, coronary artery disease [s/p CABG in 2004 and PTCA in 2019], heart failure with preserved ejection fraction [LVEF 55%] and end-stage renal disease secondary to diabetic and hypertensive nephropathy [on routine hemodialysis on a Monday/Wednesday/Friday schedule at [address] dialysis unit on [facility] under the care of Dr. Nasim Yeung since August 2001 using IJ tunneled dialysis catheter], who presented to the hospital for further evaluation of acute on chronic right-sided weakness, Slurred speech and confusion. She was initially diagnosed with acute/subacute stroke in the left frontal lobe with right-sided weakness 4 months ago. CT scan of the brain performed at presentation showed no acute finding but CT angiogram of the head and neck showed 70% stenosis in the cavernous/supraclinoid segment of the right internal carotid artery. There was also minimal punctate calcification along the posterior aspect of the right globe.     Scheduled Meds:   polyethylene glycol  17 g Oral Daily    insulin glargine  73 Units SubCUTAneous BID    busPIRone  10 mg Oral TID    insulin lispro  10 Units SubCUTAneous TID WC    [Held by provider] apixaban  5 mg Oral BID    [Held by provider] aspirin  81 mg Oral Daily    atorvastatin  80 mg Oral Daily    carvedilol  6.25 mg Oral BID    docusate sodium  100 mg Oral BID    febuxostat  40 mg Oral Daily    ferrous sulfate  325 mg Oral BID WC    furosemide  80 mg Oral BID    gabapentin  300 mg Oral BID    insulin lispro  0-18 Units SubCUTAneous TID WC    insulin lispro  0-9 Units SubCUTAneous Nightly    pantoprazole  40 mg Oral QAM AC    ranolazine  1,000 mg Oral BID    tamsulosin  0.4 mg Oral Daily    traZODone  75 mg Oral Nightly     Continuous Infusions:   sodium chloride      dextrose       PRN Meds:.sodium citrate **AND** sodium citrate, anticoagulant sodium citrate **AND** anticoagulant sodium citrate, sodium chloride, ondansetron **OR** [DISCONTINUED] ondansetron, dextrose, dextrose, glucagon (rDNA), glucose, lactulose, sodium chloride flush, acetaminophen, senna, bisacodyl    Physical Exam:    VITALS:  /60   Pulse 65   Temp 97.7 °F (36.5 °C) (Oral)   Resp 18   Ht 5' 5\" (1.651 m)   Wt 242 lb 15.2 oz (110.2 kg)   SpO2 100%   BMI 40.43 kg/m²   24HR INTAKE/OUTPUT:    No intake or output data in the 24 hours ending 11/22/21 1510  Constitutional: alert, appears stated age and cooperative    Skin: Skin color, texture, turgor normal. No rashes or lesions    Head: Normocephalic, without obvious abnormality, atraumatic     Cardiovascular/Edema: irregularly irregular rhythm    Respiratory: clear to auscultation bilaterally    Abdomen: soft, non-tender; bowel sounds normal; no masses,  no organomegaly    Back: symmetric, no curvature. ROM normal. No CVA tenderness. Extremities: edema +    Neuro:  Awake but with slurred speech; muscle power 1/5 in right upper and lower extremity. CBC:   No results for input(s): WBC, HGB, PLT in the last 72 hours.   BMP:    Recent Labs     11/21/21  0654 11/22/21  0701    139   K 4.9 4.7    102   CO2 26 24   BUN 45* 56*   CREATININE 3.13* 3.42*   GLUCOSE 100* 189*     Lab Results   Component Value Date    NITRU NEGATIVE 11/14/2021    COLORU Yellow 11/14/2021    PHUR 5.0 11/14/2021    WBCUA 5 TO 10 11/14/2021    RBCUA 0 TO 2 11/14/2021    MUCUS NOT REPORTED 11/14/2021    TRICHOMONAS NOT REPORTED 11/14/2021    YEAST FEW 11/14/2021    BACTERIA MANY 11/14/2021    SPECGRAV 1.014 11/14/2021    LEUKOCYTESUR TRACE 11/14/2021    UROBILINOGEN Normal 11/14/2021    BILIRUBINUR NEGATIVE 11/14/2021    GLUCOSEU 3+ 11/14/2021    KETUA NEGATIVE 11/14/2021    AMORPHOUS NOT REPORTED 11/14/2021     Urine Creatinine:     Lab Results   Component Value Date    LABCREA 72.0 11/14/2021     IMPRESSION/RECOMMENDATIONS:    1. ESRD ON HD [secondary to acute tubular necrosis and dialysis dependent since August 2021] - will maintain MWF hemodialysis schedule. Renal diet,i.e 2-gram sodium,2-gram potassium,1500 ml fluid restriction,1-gram phosphorus, 1800 KCal and 1.2 gram protein per day. 2.  Acute on chronic right-sided weakness - Neurology input noted. 3.  Systemic hypertension - Blood pressure is adequately controlled. 4.  Mineral bone disease profile -    PLAN  HD today as per orders  She is scheduled for AV fistula in the outpatient on Tuesday, 11/23/2021  Okay to discharge on Monday, 11/22/2021 after dialysis. Prognosis is guarded.      Kailey Gunderson MD   Attending Nephrologist  11/22/2021 3:10 PM

## 2021-11-22 NOTE — CARE COORDINATION
Met with pt and reviewed d/c plan. Pt to return home with [facility] and sister Idania Garcia to provide transportation. Contacted Tere regarding early morning appointment tomorrow; she will be here about 8/8:30 AM. [facility] and spoke with Marcelo Ramirez; update provided and they will follow pt at home. No additional needs identified at this time.

## 2021-11-23 VITALS
HEART RATE: 69 BPM | OXYGEN SATURATION: 95 % | HEIGHT: 65 IN | DIASTOLIC BLOOD PRESSURE: 68 MMHG | BODY MASS INDEX: 40.73 KG/M2 | RESPIRATION RATE: 16 BRPM | WEIGHT: 244.49 LBS | TEMPERATURE: 97.7 F | SYSTOLIC BLOOD PRESSURE: 143 MMHG

## 2021-11-23 LAB — GLUCOSE BLD-MCNC: 143 MG/DL (ref 65–105)

## 2021-11-23 PROCEDURE — 82947 ASSAY GLUCOSE BLOOD QUANT: CPT

## 2021-11-23 PROCEDURE — 6370000000 HC RX 637 (ALT 250 FOR IP): Performed by: STUDENT IN AN ORGANIZED HEALTH CARE EDUCATION/TRAINING PROGRAM

## 2021-11-23 PROCEDURE — 97530 THERAPEUTIC ACTIVITIES: CPT

## 2021-11-23 PROCEDURE — 97535 SELF CARE MNGMENT TRAINING: CPT

## 2021-11-23 PROCEDURE — 1180000000 HC REHAB R&B

## 2021-11-23 PROCEDURE — 99232 SBSQ HOSP IP/OBS MODERATE 35: CPT | Performed by: FAMILY MEDICINE

## 2021-11-23 PROCEDURE — 6370000000 HC RX 637 (ALT 250 FOR IP): Performed by: FAMILY MEDICINE

## 2021-11-23 PROCEDURE — 99239 HOSP IP/OBS DSCHRG MGMT >30: CPT | Performed by: PHYSICAL MEDICINE & REHABILITATION

## 2021-11-23 RX ADMIN — FUROSEMIDE 80 MG: 40 TABLET ORAL at 07:45

## 2021-11-23 RX ADMIN — PANTOPRAZOLE SODIUM 40 MG: 40 TABLET, DELAYED RELEASE ORAL at 06:25

## 2021-11-23 RX ADMIN — ACETAMINOPHEN 650 MG: 325 TABLET ORAL at 07:45

## 2021-11-23 RX ADMIN — CARVEDILOL 6.25 MG: 6.25 TABLET, FILM COATED ORAL at 07:44

## 2021-11-23 ASSESSMENT — PAIN DESCRIPTION - FREQUENCY: FREQUENCY: CONTINUOUS

## 2021-11-23 ASSESSMENT — PAIN DESCRIPTION - ONSET: ONSET: GRADUAL

## 2021-11-23 ASSESSMENT — PAIN DESCRIPTION - PROGRESSION: CLINICAL_PROGRESSION: NOT CHANGED

## 2021-11-23 ASSESSMENT — PAIN DESCRIPTION - LOCATION: LOCATION: ARM

## 2021-11-23 ASSESSMENT — PAIN SCALES - GENERAL
PAINLEVEL_OUTOF10: 3
PAINLEVEL_OUTOF10: 3

## 2021-11-23 ASSESSMENT — PAIN DESCRIPTION - DESCRIPTORS: DESCRIPTORS: ACHING

## 2021-11-23 ASSESSMENT — PAIN DESCRIPTION - PAIN TYPE: TYPE: ACUTE PAIN

## 2021-11-23 NOTE — PROGRESS NOTES
Balance: Supervision  Transfers  Sit to stand: Modified independent  Stand to sit: Modified independent     Functional Mobility  Functional - Mobility Device: Rolling Walker  Activity: To/from bathroom  Assist Level: Supervision  Toilet Transfers  Toilet - Technique: Ambulating  Equipment Used: Standard toilet  Toilet Transfer: Supervision  Toilet Transfers Comments: RW                             ADL  Feeding: Modified independent   Grooming: Modified independent   UE Bathing: Setup  LE Bathing: Setup  UE Dressing: Setup  LE Dressing: Minimal assistance (Assist to maria de jesus socks/shoes this date due to fatigue. )  Toileting: Supervision          Assessment  Performance deficits / Impairments: Decreased ADL status; Decreased functional mobility ; Decreased strength; Decreased safe awareness; Decreased cognition; Decreased endurance; Decreased sensation; Decreased high-level IADLs; Decreased coordination  Prognosis: Good  Discharge Recommendations: Patient would benefit from continued therapy after discharge  Activity Tolerance: Patient Tolerated treatment well  Safety Devices in place: Yes  Type of devices: Call light within reach; Left in chair          Patient Education: Reinforcing EC/WS/safety technique for home tasks. Patient Goals   Patient goals : \"I would like to get my hand and right arm going. My memory back.  Just being able to walk around and get my right leg going\"  Learner:family and patient  Method: explanation       Outcome: acknowledged understanding of         Plan  Plan  Times per week: 900 minutes combined between OT/PT/SLP   Times per day: Twice a day  Current Treatment Recommendations: Self-Care / ADL, Strengthening, ROM, Balance Training, Functional Mobility Training, Endurance Training, Cognitive Reorientation, Safety Education & Training, Patient/Caregiver Education & Training, Equipment Evaluation, Education, & procurement, Home Management Training, Cognitive/Perceptual Training  Patient Goals Patient goals : \"I would like to get my hand and right arm going. My memory back. Just being able to walk around and get my right leg going\"  Short term goals  Time Frame for Short term goals: By 4-5 days  Short term goal 1: Pt will complete lower body dressing/bathing with CGA and good safety with use of AE as needed  Short term goal 2: Pt will complete upper body dressing with set-up assistance and good safety/attention to task  Short term goal 3: Pt will complete functional transfers/mobility during self care tasks with SBA and good safety with use of least restrictive device  Short term goal 4: Pt will tolerate standing 5+ minutes during functional activity of choice with good safety  Short term goal 5: Pt will participate in 30+ minutes of therapeutic exercises/functional activities to increase safety and independence with self care and mobility  Long term goals  Time Frame for Long term goals : By discharge  Long term goal 1: Pt will complete BADLs with modified independence and good safety with use of AE as needed.    Long term goal 2: Pt will complete functional transfers/mobility during self care tasks with modified independence and good safety with use of least restrictive device  Long term goal 3: Pt will tolerate standing 10+ minutes during functional activity of choice with good safety  Long term goal 4: Pt will verbalize/demonstrate good understanding of home safety/fall prevention strategies to increase safety and independence with self care and mobiliyt  Long term goal 5: Pt will complete simple meal prep/light house keeping task with supervision and good safety         11/23/21 0841   OT Individual Minutes   Time In 0801   Time Out [address]   Minutes 36     Electronically signed by DAYANA Ocampo on 11/23/21 at 9:15 AM EST

## 2021-11-23 NOTE — PROGRESS NOTES
Physical Medicine & Rehabilitation  Progress Note    11/23/2021 9:43 AM     CC: Ambulatory and ADL dysfunction due tor ecrudescence of right-sided and cognitive stroke symptoms    Subjective:   No complaints. Feels well. Discharge plan today. Sister present to transfer for AV fistula procedure. Questions answered. ROS:  Denies fevers, chills, sweats. No chest pain, palpitations, lightheadedness. Denies coughing, wheezing or shortness of breath. Denies abdominal pain, nausea, diarrhea or constipation. No new areas of joint pain. Denies new areas of numbness or weakness. Denies new anxiety or depression issues. No new skin problems. Rehabilitation:   PT:  Restrictions/Precautions: Contact Precautions, Fall Risk  Implants present? :  (pt denies)  Other position/activity restrictions: Hemodialysis MWF  Required Braces or Orthoses  Right Lower Extremity Brace:  (-)  Left Lower Extremity Brace:  (-)   Transfers  Sit to Stand: Stand by assistance  Stand to sit: Stand by assistance  Bed to Chair: Stand by assistance  Stand Pivot Transfers: Stand by assistance  Comment: uses rollator or RW  Ambulation 1  Surface: level tile  Device: Rollator  Other Apparatus: Wheelchair follow  Assistance: Modified Independent  Quality of Gait: Pt demos a decrease steady gait. Minimal R foot clearance of floor but demonstrating fair heel strike. Gait Deviations: Slow Annie, Decreased step length, Decreased step height  Distance: 75ft, 90ft  Comments: Pt requires a seated rest break between amb.            OT:  ADL  Feeding: Modified independent   Grooming: Modified independent   UE Bathing: Setup  LE Bathing: Setup  UE Dressing: Setup  LE Dressing: Minimal assistance (Assist to maria de jesus socks/shoes this date due to fatigue. )  Toileting: Supervision  Additional Comments: Please see AM note for ADLs         Balance  Sitting Balance: Independent  Standing Balance: Supervision   Standing Balance  Time: 30 sec  Activity: short functional mobility back to chair  Comment: w RW  Functional Mobility  Functional - Mobility Device: Rolling Walker  Activity: To/from bathroom  Assist Level: Supervision  Functional Mobility Comments: Verbal cues for hand placement and safety     Bed mobility  Bridging: Contact guard assistance (Writer to assist holding RLE in place. )  Rolling to Left: Independent  Rolling to Right: Independent  Supine to Sit: Independent  Sit to Supine: Independent  Scooting: Minimal assistance (Bracing R foot to scoot HOB; Pt using rails, bridging)  Comment: Pt performs bed mobility with HOB slightly elevated and minimal use of bed rails. Transfers  Stand Step Transfers: Supervision  Stand Pivot Transfers: Supervision  Sit to stand: Modified independent  Stand to sit: Modified independent  Transfer Comments: Verbal cues for hand placement and safety   Toilet Transfers  Toilet - Technique: Ambulating  Equipment Used: Standard toilet  Toilet Transfer: Supervision  Toilet Transfers Comments: RW     Shower Transfers  Shower - Transfer To: Shower seat with back  Shower - Technique: Ambulating  Shower Transfers: Minimal assistance  Shower Transfers Comments: OT assisted pt in backing up to shower with RW and utilizing grab bars to assist with back steping over threshold and sitting on shower chair. Pt demonstrated good safety with increased time to complete shower transfer. ST:    Subjective: [x]? Alert     [x]? Cooperative     []? Confused     []? Agitated    []? Lethargic  Objective/Assessment:  Attention: functional      Recall:   Pt. Recalling staff members names and specific details re: her upcoming appt. Pt. Utilizes calendar in phone and continues to write in journal.       Organization: NA     Problem Solving/Reasoning:   Word problems using chart- 50%, 100% c cues x1, 57%, 100% c cues x1.      Other:  Pt. Continues to complete homework worksheets given to her by ST.      Plan:  [x]? Continue ST services    []? sodium citrate, anticoagulant sodium citrate **AND** anticoagulant sodium citrate, sodium chloride, ondansetron **OR** [DISCONTINUED] ondansetron, dextrose, dextrose, glucagon (rDNA), glucose, lactulose, sodium chloride flush, acetaminophen, senna, bisacodyl     Diagnostics:     CBC: No results for input(s): WBC, RBC, HGB, HCT, MCV, RDW, PLT in the last 72 hours. BMP:   Recent Labs     11/21/21  0654 11/22/21  0701    139   K 4.9 4.7    102   CO2 26 24   BUN 45* 56*   CREATININE 3.13* 3.42*     BNP: No results for input(s): BNP in the last 72 hours. PT/INR: No results for input(s): PROTIME, INR in the last 72 hours. APTT: No results for input(s): APTT in the last 72 hours. CARDIAC ENZYMES: No results for input(s): CKMB, CKMBINDEX, TROPONINT in the last 72 hours. Invalid input(s): CKTOTAL;3  FASTING LIPID PANEL:  Lab Results   Component Value Date    CHOL 105 04/09/2015    HDL 43 04/09/2015    TRIG 168 04/09/2015     LIVER PROFILE: No results for input(s): AST, ALT, ALB, BILIDIR, BILITOT, ALKPHOS in the last 72 hours. I/O (24Hr): Intake/Output Summary (Last 24 hours) at 11/23/2021 0943  Last data filed at 11/22/2021 1832  Gross per 24 hour   Intake 400 ml   Output 2500 ml   Net -2100 ml       Glu last 24 hour  Recent Labs     11/22/21  1200 11/22/21  1830 11/22/21 2013 11/23/21  0628   POCGLU 301* 128* 149* 143*       No results for input(s): CLARITYU, COLORU, PHUR, SPECGRAV, PROTEINU, RBCUA, BLOODU, BACTERIA, NITRU, WBCUA, LEUKOCYTESUR, YEAST, GLUCOSEU, BILIRUBINUR in the last 72 hours. X-ray left foot 11/19  Impression:     1. Diffuse soft tissue swelling along the left foot without evidence of an   acute fracture or osteomyelitis. Impression/Plan:    1. Recrudescence of right-sided and cognitive stroke symptoms:  PT/OT for gait, mobility, strengthening, endurance, ADLs, and self care.  SLP for cognition.    2. CVA-on aspirin (on hold as below), eliquis (on hold as below), and atorvastatin.  Follow up with neurology. 3. Anemia:  Hemoglobin 11.7 on 11/17, stable.  Monitoring.  On iron supplementation. 4. Eschar on left great toe:  Wound care following. Podiatry consulted by IM - ordered x-ray of the left foot-as above, podiatry evaluation appreciated-nonsurgical, can follow-up as outpatient. No change-discussed with patient/sister to follow-up with podiatry, monitor closely  5. AURELIA on CKD:  On hemodialysis MWF.  Nephrology following. Dialysis Monday, AV fistula placement Tuesday 9/23 at 10:30 AM,  Eliquis and aspirin on hold for procedure per instructions patient was given. NPO at midnight on 11/23.-Discussed with Dr. Osito Reilly, notes aspirin Eliquis already on hold, n.p.o. midnight 11/22, sips of meds and can take carvedilol in a.m., insulin to be held morning 11/23, resumption of medications per surgeon post procedure-nursing reviewed with surgeon, sister to transport today  6. CHF, HTN:  On carvedilol, furosemide  7. Lymphedema bilateral lower extremity-has stockings  8. Type 2 diabetes with neuropathy:  On lantus BID - IM increased dose on 11/18, humalog TID + sliding scale.  Has gabapentin BID-continue current insulin per family practice, patient takes insulin twice daily, 10 units with meals and sliding scale-currently and previously at home, reviewed with patient and sister-patient notes she has been doing this at home, reviewed signs symptoms of hypoglycemia/hyperglycemia, monitor closely  9. Insomnia:  On trazodone nightly  10. Adjustment disorder with anxiety:  Psychiatry consulted in acute care - recommended titration of buspar to 10mg TID, discussed with patient and increased the dose 11/17; also recommended consideration of an acetylcholinesterase inhibitor - will hold off for now, as patient is doing well with speech therapy and utilizing strategies to help with memory. Recommend outpatient counseling.   Patient would like BuSpar decreased back to 7.5 3 times daily as she was on at home on discharge-discussed with Dr. Marilyn Rivero to change on discharge. 11. Gout:  On febuxostat  12. Obesity:  BMI 41.02  13. Bowel Management: Docusate BID, miralax daily, senokot prn, dulcolax prn. 14. DVT Prophylaxis:  On eliquis (on hold as above)  15. Internal Medicine for medical management   16. Discharge plan 11/22 AM adrian with family practice, patient to go for AV fistula-n.p.o. , follow-up with 1. PCP Crystal KILLIAN/Jong 2. Nephrology - [facility] 3. Surgeon 4. Rehab- [facility], 5. Podiatry Dr. Jimi Schwarz, 6. Psychiatry/counseling home PT/OT/Nsg, has been on Eliquis- patient resume aspirin/Eliquis when okay with surgeon. No driving, CBC/BMP 1 week, monitor glucose before meals and at bedtime all above discussed with patient, prescription to [facility] per patient request, med/precautions/above are reviewed with patient and sister    45 minutes spent on discharge      Adriano Titus MD       This note is created with the assistance of a speech recognition program.  While intending to generate a document that actually reflects the content of the visit, the document can still have some errors including those of syntax and sound a like substitutions which may escape proof reading.   In such instances, actual meaning can be extrapolated by contextual diversion

## 2021-11-23 NOTE — PLAN OF CARE
Problem: Skin Integrity:  Goal: Will show no infection signs and symptoms  Description: Will show no infection signs and symptoms  11/23/2021 0449 by Naty Grider RN  Outcome: Ongoing     Problem: Skin Integrity:  Goal: Absence of new skin breakdown  Description: Absence of new skin breakdown  Outcome: Ongoing     Problem: Falls - Risk of:  Goal: Will remain free from falls  Description: Will remain free from falls  11/23/2021 0449 by Naty Grider RN  Outcome: Ongoing     Problem: Falls - Risk of:  Goal: Absence of physical injury  Description: Absence of physical injury  Outcome: Ongoing     Problem: Neurological  Goal: Maximum potential motor/sensory/cognitive function  Outcome: Ongoing     Problem: Pain:  Goal: Pain level will decrease  Description: Pain level will decrease  11/23/2021 0449 by Naty Grider RN  Outcome: Ongoing     Problem: Pain:  Goal: Control of acute pain  Description: Control of acute pain  Outcome: Ongoing     Problem: Pain:  Goal: Control of chronic pain  Description: Control of chronic pain  Outcome: Ongoing

## 2021-11-23 NOTE — PROGRESS NOTES
Patient called post discharge and informed writer that she called [facility] to let them know that she was on her way to the hospital for her surgical procedure. She was informed per surgical staff that her procedure was cancelled due to her insurance not being covered over at [facility]. Writer informs Case Management, Dr. Magno Yun, and Dr. Netta Dexter office. Dr. Netta Dexter office to contact patient in regards to blood thinners and also regarding follow up regarding fistula. Dar speaks with Clifford Zarco at her office.

## 2021-11-23 NOTE — PROGRESS NOTES
[id number]-Writer pages Dr. Mika Ramirez to let him know about patient's canceled procedure for fistula this am.

## 2021-11-23 NOTE — PROGRESS NOTES
[id number]    carvedilol (COREG) tablet 6.25 mg  6.25 mg Oral BID Jethro Baez MD   6.25 mg at 11/22/21 2200    docusate sodium (COLACE) capsule 100 mg  100 mg Oral BID Jethro Baez MD   100 mg at 11/22/21 2200    febuxostat (ULORIC) tablet 40 mg  40 mg Oral Daily Jethro Baez MD   40 mg at 11/22/21 0746    ferrous sulfate (IRON 325) tablet 325 mg  325 mg Oral BID SUJEY Baez MD   325 mg at 11/20/21 0803    furosemide (LASIX) tablet 80 mg  80 mg Oral BID Jethro Baez MD   80 mg at 11/22/21 1839    gabapentin (NEURONTIN) capsule 300 mg  300 mg Oral BID Jethro Baez MD   300 mg at 11/22/21 2200    insulin lispro (HUMALOG) injection vial 0-18 Units  0-18 Units SubCUTAneous TID  Jethro Baez MD   12 Units at 11/22/21 1221    insulin lispro (HUMALOG) injection vial 0-9 Units  0-9 Units SubCUTAneous Nightly Jethro Baez MD   2 Units at 11/22/21 2201    lactulose (CHRONULAC) 10 GM/15ML solution 20 g  20 g Oral TID PRN Jethro Baez MD        pantoprazole (PROTONIX) tablet 40 mg  40 mg Oral QAM AC Jethro Baez MD   40 mg at 11/23/21 0025    ranolazine (RANEXA) extended release tablet 1,000 mg  1,000 mg Oral BID Jethro Baez MD   1,000 mg at 11/22/21 2200    sodium chloride flush 0.9 % injection 10 mL  10 mL IntraVENous PRN Jethro Baez MD        tamsulosin [facility]) capsule 0.4 mg  0.4 mg Oral Daily Jethro Baez MD   0.4 mg at 11/22/21 0744    traZODone (DESYREL) tablet 75 mg  75 mg Oral Nightly Jethro Baez MD   75 mg at 11/22/21 2200    acetaminophen (TYLENOL) tablet 650 mg  650 mg Oral Q4H PRN Renay Sherman MD   650 mg at 11/22/21 2159    senna (SENOKOT) tablet 17.2 mg  2 tablet Oral Daily PRN Renay Sherman MD        bisacodyl (DULCOLAX) suppository 10 mg  10 mg Rectal Daily PRN Renay Sherman MD           Intake/Output Summary (Last 24 hours) at 11/23/2021 0711  Last data filed at 11/22/2021 1832  Gross per 24 hour   Intake 400 ml   Output 2500 ml   Net -2100 ml     Recent Results (from the past 24 hour(s))   POC Glucose Fingerstick    Collection Time: 11/22/21 12:00 PM   Result Value Ref Range    POC Glucose 301 (H) 65 - 105 mg/dL   POC Glucose Fingerstick    Collection Time: 11/22/21  6:30 PM   Result Value Ref Range    POC Glucose 128 (H) 65 - 105 mg/dL   POC Glucose Fingerstick    Collection Time: 11/22/21  8:13 PM   Result Value Ref Range    POC Glucose 149 (H) 65 - 105 mg/dL   POC Glucose Fingerstick    Collection Time: 11/23/21  6:28 AM   Result Value Ref Range    POC Glucose 143 (H) 65 - 105 mg/dL     -----------------------------------------------------------------  RAD:  EKG:  Micro:     Assessment & Plan:    Patient Active Problem List:     Atherosclerosis of coronary artery bypass graft of native heart without angina pectoris     Acute renal failure ([facility])     Diabetes mellitus due to underlying condition with diabetic nephropathy, with long-term current use of insulin ([facility])     Chronic diastolic heart failure ([facility])     Diabetic polyneuropathy associated with type 2 diabetes mellitus [facility]     History of coronary artery bypass graft     Iron deficiency anemia     Spinal stenosis of lumbar region with neurogenic claudication     Mixed hyperlipidemia     Stage 4 chronic kidney disease ([facility])     Type 2 diabetes mellitus with kidney complication, with long-term current use of insulin ([facility])     Syncope and collapse     Obesity, Class II, BMI 35-39.9     Thyroid nodule greater than or equal to 1 cm in diameter incidentally noted on imaging study     Essential hypertension     Anemia in stage 4 chronic kidney disease (HCC)     Chronic ischemic heart disease     Ischemic stroke of frontal lobe ([facility])     Stroke-like symptoms     Morbid obesity with BMI of 40.0-44.9, adult ([facility])     Acute encephalopathy     Disequilibrium syndrome     Debility     Long-term memory loss     Muscle right arm weakness     Anxiety     Chronic midline low back pain with bilateral sciatica     Need for immunization against influenza     Altered mental status           Plan:  -Recrudescence of right sided weakness and cognitive stroke symptoms - PT/OT/Speech treating. -CVA - on ASA and Eliquis, on held for surgery Today. -AURELIA on CKD - on dialysis M,W,F, fistula to be placed on 11/23, insulin to be held starting the morning of 11/23, ASA & Eliquis already on hold, pt should be NPO with sips of meds and take carvedilol Tues am.  -Okay to discharge this am.  -Continue current treatments.  -Complete orders per chart.     See orders   Disposition:    Electronically signed by Nuvia Cary MD on 11/23/2021 at 7:11 AM

## 2021-11-23 NOTE — PROGRESS NOTES
Patient discharged home with belongings per wheelchair. Discharge instructions provided. Patient accompanied by her sister.

## 2021-11-24 PROCEDURE — 1180000000 HC REHAB R&B

## 2021-11-24 NOTE — DISCHARGE SUMMARY
Physical Medicine & Rehabilitation  Discharge Summary     Patient Identification:  Gilford Rather  : 1960  Admit date: 11/15/2021  Discharge date: 2021  Primary care provider: AFSANEH Tucker CNP     Problem List:    Recrudescence of right-sided weakness and cognitive stroke symptoms  Anemia  Eschar to left great toe  Acute on chronic kidney disease/end-stage renal failure  CHF  Hypertension  Lymphedema lower extremities  Type 2 diabetes  Insomnia  Anxiety  Gout  Obesity        Patient Active Problem List   Diagnosis    Atherosclerosis of coronary artery bypass graft of native heart without angina pectoris    [facility]    Diabetes mellitus due to underlying condition with diabetic nephropathy, with long-term current use of insulin (HCC)    Chronic diastolic heart failure ([facility]    Diabetic polyneuropathy associated with type 2 diabetes mellitus ([facility]    History of coronary artery bypass graft    Iron deficiency anemia    Spinal stenosis of lumbar region with neurogenic claudication    Mixed hyperlipidemia    Stage 4 chronic kidney disease (HCC)    Type 2 diabetes mellitus with kidney complication, with long-term current use of insulin (HCC)    Syncope and collapse    Obesity, Class II, BMI 35-39.9    Thyroid nodule greater than or equal to 1 cm in diameter incidentally noted on imaging study    Essential hypertension    Anemia in stage 4 chronic kidney disease (HCC)    Chronic ischemic heart disease    Ischemic stroke of frontal lobe (HCC)    Stroke-like symptoms    Morbid obesity with BMI of 40.0-44.9, adult (HCC)    Acute encephalopathy    Disequilibrium syndrome    Debility    Long-term memory loss    Muscle right arm weakness    Anxiety    Chronic midline low back pain with bilateral sciatica    Need for immunization against influenza    Altered mental status       Admission History:  Gilford Rather is a 59 y.o. right-handed female with history of CVA, AURELIA on CKD requiring hemodialysis, CHF, HTN, type 2 diabetes admitted to the [address] [place] [address] unit on 11/15/2021. She was originally admitted to [facility] on 11/10/21.     She initially presented with an episode of altered mental status and right-sided weakness at dialysis. She also had acute memory loss. She is known from 2 previous acute rehab admissions after ischemic CVA and a similar episode of altered mental status. Neurology was consulted, and MRI brain showed no acute intracranial abnormalities. EEG was normal.  Psychiatry was consulted and recommended increase in buspar. Inpatient Rehabilitation Course:   Vanessa Huang was admitted to inpatient rehabilitation on 11/15/2021. Rehab course:  Patient progressed well and benefit for acute inpatient rehabilitation. Discharged in stable and good condition. Patient anemia was stable throughout admission. She was noted to have a eschar to the left medial great toe. X-ray was negative, podiatry consulted. Can follow-up as outpatient. This was discussed with patient as well as sister precautions were reviewed. Monitor closely. She continued hemodialysis acute on chronic kidney disease, she is planned for AV fistula on 11/23/2021, Eliquis and aspirin was held for 5 days, dialysis schedule arranged, medication/insulin held the morning of 11/22. Patient was discharged and was to be taken by sister for AV fistula. Was informed that they called on the way to the appointment and was informed that it was canceled as the location was not covered by the insurance. Dr. Susan Coker notified to clarify with patient anticoagulation resumption, insulin, etc.  Dr. Lindy Russo of nephrology also notified. Blood pressure under good control with medications    Insulin adjusted by family practice-was taking twice daily, +10 units with meals and sliding scale. Patient to continue on discharge.   Reviewed hypoglycemia and hyperglycemia symptoms and precautions. She had adjustment disorder with anxiety-psychiatry recommended increasing BuSpar to 10 3 times a day, patient requested going back down to 7.5 3 times a day on discharge. Discharge status:  fair    Discharge Dispostiion:   Home with [address] [place] [address]      The patient participated in an aggressive multidisciplinary inpatient rehabilitation program involving 3 hours per day, 5 days per week of rehabilitation. Patient benefited from inpatient rehab and was discharged in good and stable condition. Consults:   nephrology, family practice      Significant Diagnostics:   CBC:   Lab Results   Component Value Date    WBC 6.5 11/17/2021    RBC 3.53 11/17/2021    HGB 11.7 11/17/2021    HCT 34.5 11/17/2021    MCV 97.8 11/17/2021    MCH 33.2 11/17/2021    MCHC 33.9 11/17/2021    RDW 14.9 11/17/2021     11/17/2021    MPV 8.6 11/17/2021     BMP:    Lab Results   Component Value Date     11/22/2021    K 4.7 11/22/2021     11/22/2021    CO2 24 11/22/2021    BUN 56 11/22/2021    LABALBU 3.4 09/17/2021    CREATININE 3.42 11/22/2021    CALCIUM 9.5 11/22/2021    GFRAA 16 11/22/2021    LABGLOM 14 11/22/2021    GLUCOSE 189 11/22/2021       Discharge Functional Status:    Physical therapy:  Bed Mobility: Rolling: Stand by assistance, Rolling Left  Supine to Sit: Stand by assistance  Sit to Supine: Supervision  Scooting: Minimal assistance (Bracing R foot to scoot HOB; Pt using rails, bridging)  Transfers: Sit to Stand: Stand by assistance  Stand to sit: Stand by assistance  Bed to Chair: Stand by assistance  Comment: rest breaks PRN. , Ambulation 1  Surface: level tile  Device: Rollator  Other Apparatus: Wheelchair follow  Assistance: Modified Independent  Quality of Gait: Pt demos a decrease steady gait. Minimal R foot clearance of floor but demonstrating fair heel strike.    Gait Deviations: Slow Annie, Decreased step length, Decreased step height  Distance: 75ft, 90ft  Comments: Pt requires a seated rest break between amb. , Stairs  # Steps : 5 (x2)  Stairs Height: 4\" (and 6\" )  Rails: Bilateral  Curbs: 6\"  Device: No Device  Assistance: Contact guard assistance (Progressing to SBA)  Comment: Pt demonstrates Good return to review of sequencing before attempt; slightly SOB. Completed on training stairs this date. Mobility:  , PT Equipment Recommendations  Equipment Needed:  (TBD), Assessment:  Pt with recent left frontal stroke with residual mild right-sided weakness, pt is a Hemodialysis pt. Presents with impaired cognition, increased Right side weakness and sensation deficits. Pt requiring SBA for bed mobility, and CGA for transfers and short distance gait 30-40' on both level tile and carpeting. pt is motivated and has good home support. pt would benefit from continued PT services to address deficits of strength, coordination, balance, posture, and overall functional mobility to allow for a safe return home. Occupational therapy:  , Equipment Recommendations  Equipment Needed: Yes,      Speech therapy:  Comprehension: 6 - Complex ideas 90% or device (hearing aid or glasses- if patient is primarily a visual learner)  Expression: 6 - Device used to express complex ideas/needs  Social Interaction: 6 - Patient requires medication for mood and/or effect  Problem Solvin - Patient able to solve simple/routine tasks  Memory: 4 - Patient remembers 75-90%+ of the time    Patient Instructions:      follow-up with 1. PCP Willis KILLIAN/Jong 2. Nephrology - Imjakob 3. Surgeon 4. Rehab- Edmund/Hiram, 5. Podiatry Dr. Janell Lenz, 6. Psychiatry/counseling home PT/OT/Nsg, has been on Eliquis- patient resume aspirin/Eliquis when okay with surgeon.    No driving, CBC/BMP 1 week, monitor glucose before meals and at bedtime all above discussed with patient    Contacted patient 21-verify discharge medication, also insulin taking twice daily as prescribed, with meals and sliding scale, sugars been okay. Clarified if she has contacted Dr. Alma Cuellar regarding resumption of aspirin/Eliquis-she is planning to call today-discussed importance. Aileen Baumgarten served Dr. Jonna Stephens as well to inform of canceled AV fistula, and needing clarification on resumption of anticoagulation, etc, nursing staff also contacted Dr. Khoa Galvan office yesterday to inform of above as well    Medications, precautions and follow up reviewed with patient and family    Follow-up visits: See after visit summary from hospitalization    Discharge Medications:           Medication List      START taking these medications    docusate sodium 100 MG capsule  Commonly known as: COLACE  Take 1 capsule by mouth 2 times daily  Notes to patient: constipation     polyethylene glycol 17 g packet  Commonly known as: GLYCOLAX  Take 17 g by mouth daily  Notes to patient: Constipation        CONTINUE taking these medications    aspirin 81 MG chewable tablet  Take 1 tablet by mouth daily     atorvastatin 80 MG tablet  Commonly known as: LIPITOR  Take 1 tablet by mouth daily     busPIRone 7.5 MG tablet  Commonly known as: BUSPAR  Take 1 tablet by mouth 3 times daily     carvedilol 6.25 MG tablet  Commonly known as: Coreg  Take 1 tablet by mouth 2 times daily     Eliquis 5 MG Tabs tablet  Generic drug: apixaban  Take 1 tablet by mouth 2 times daily     febuxostat 40 MG Tabs tablet  Commonly known as: ULORIC  Take 1 tablet by mouth daily     ferrous sulfate 325 (65 Fe) MG tablet  Commonly known as: Ivan-Aram  Take 1 tablet by mouth 2 times daily (with meals)     furosemide 80 MG tablet  Commonly known as: LASIX  Take 1 tablet by mouth 2 times daily     gabapentin 300 MG capsule  Commonly known as: NEURONTIN  Take 1 capsule by mouth 2 times daily for 30 days.      pantoprazole 40 MG tablet  Commonly known as: PROTONIX  Take 1 tablet by mouth every morning (before breakfast)     ranolazine 1000 MG extended release tablet  Commonly known as: RANEXA  Take

## 2021-11-25 PROCEDURE — 1180000000 HC REHAB R&B

## 2021-11-26 PROCEDURE — 1180000000 HC REHAB R&B

## 2021-11-27 PROCEDURE — 1180000000 HC REHAB R&B

## 2021-11-28 PROCEDURE — 1180000000 HC REHAB R&B

## 2021-11-29 PROCEDURE — 1180000000 HC REHAB R&B

## 2021-11-30 DIAGNOSIS — N18.6 ESRD (END STAGE RENAL DISEASE) (HCC): Primary | ICD-10-CM

## 2021-11-30 PROCEDURE — 1180000000 HC REHAB R&B

## 2021-12-01 ENCOUNTER — TELEPHONE (OUTPATIENT)
Dept: PSYCHIATRY | Age: 61
End: 2021-12-01

## 2021-12-01 DIAGNOSIS — F41.9 ANXIETY DISORDER, UNSPECIFIED TYPE: Primary | ICD-10-CM

## 2021-12-01 DIAGNOSIS — F32.A DEPRESSION, UNSPECIFIED DEPRESSION TYPE: ICD-10-CM

## 2021-12-01 PROCEDURE — 1180000000 HC REHAB R&B

## 2021-12-01 NOTE — TELEPHONE ENCOUNTER
.Appointment Type: New Patient    Outcome of Phone Call: Other Referral    Referring Provider: yes-see referral information    Verify current insurance    Verify PCP    Did provider ask for outreach? yes-referral call    Did patient deny appointment or request an appointment at a later date? no    Are there any barriers with the patient/specific requests?  yes, Referral was updated to reflect provider needed per Lori Iyer     Please contact if phone call is returned: Marva April- [phone]

## 2021-12-02 PROCEDURE — 1180000000 HC REHAB R&B

## 2021-12-03 PROCEDURE — 1180000000 HC REHAB R&B

## 2021-12-04 PROCEDURE — 1180000000 HC REHAB R&B

## 2021-12-05 PROCEDURE — 1180000000 HC REHAB R&B

## 2021-12-06 ENCOUNTER — INITIAL CONSULT (OUTPATIENT)
Dept: VASCULAR SURGERY | Age: 61
End: 2021-12-06
Payer: COMMERCIAL

## 2021-12-06 VITALS
HEART RATE: 71 BPM | WEIGHT: 244 LBS | SYSTOLIC BLOOD PRESSURE: 150 MMHG | BODY MASS INDEX: 40.65 KG/M2 | DIASTOLIC BLOOD PRESSURE: 70 MMHG | RESPIRATION RATE: 18 BRPM | OXYGEN SATURATION: 95 % | HEIGHT: 65 IN | TEMPERATURE: 98.1 F

## 2021-12-06 DIAGNOSIS — Z99.2 ENCOUNTER REGARDING VASCULAR ACCESS FOR DIALYSIS FOR ESRD (HCC): Primary | ICD-10-CM

## 2021-12-06 DIAGNOSIS — N18.6 ENCOUNTER REGARDING VASCULAR ACCESS FOR DIALYSIS FOR ESRD (HCC): Primary | ICD-10-CM

## 2021-12-06 PROCEDURE — 99204 OFFICE O/P NEW MOD 45 MIN: CPT | Performed by: SURGERY

## 2021-12-06 ASSESSMENT — ENCOUNTER SYMPTOMS
EYE DISCHARGE: 0
COLOR CHANGE: 0
ABDOMINAL PAIN: 0
NAUSEA: 0
EYE PAIN: 0
VOMITING: 0
WHEEZING: 0
VOICE CHANGE: 1
ABDOMINAL DISTENTION: 0
SHORTNESS OF BREATH: 0

## 2021-12-06 NOTE — PROGRESS NOTES
Division of Vascular Surgery        New Consult    Physician Requesting Consult:  Dr. Johnathon Cevallos    Reason for Consult: HD access     Chief Complaint:     ESRD  Need for hemodialysis access    History of Present Illness:      Jonn Marsh is a 59 y.o. right handed woman with ESRD secondary to diabetic and hypertensive nephropathy on hemodialysis Monday/Wednesday/Friday at [facility] in [place] through a tunneled catheter. She denies any issues with use of the tunneled catheter. She has had multiple episodes of passing out and then losing her memory during dialysis and family also endorses that she has episodes memory loss intermittently since she started dialysis. Also she has developed right upper extremity weakness and pain after a recent dialysis session. She feels her right upper extremity symptoms continue to improve. Stroke workup in September 2021 did not reveal any acute stroke. Reports of possible stroke in August 2021 at outlying facility. No left upper extremity coolness, weakness, or decreased range of motion. She does endorse some intermittent left hand numbness after dialysis sessions.      Medical History:     Past Medical History:   Diagnosis Date    Backache, unspecified     CHF (congestive heart failure) (HCC)     Chronic kidney disease     Coronary atherosclerosis of artery bypass graft     Cramp of limb     Gallstones     Hypertension     Insomnia     Pneumonia     Type II or unspecified type diabetes mellitus with renal manifestations, not stated as uncontrolled(250.40)     Type II or unspecified type diabetes mellitus without mention of complication, not stated as uncontrolled     Unspecified vitamin D deficiency        Surgical History:     Past Surgical History:   Procedure Laterality Date    ANKLE FRACTURE SURGERY      BREAST SURGERY      CARPAL TUNNEL RELEASE      CHOLECYSTECTOMY, OPEN N/A     CORONARY ARTERY BYPASS GRAFT      x3    HAND SURGERY      IR TUNNELED CATHETER PLACEMENT GREATER THAN 5 YEARS  8/18/2021    IR TUNNELED CATHETER PLACEMENT GREATER THAN 5 YEARS 8/18/2021 [facility] PROCEDURES    KNEE ARTHROSCOPY      right    TONSILLECTOMY         Family History:     Family History   Problem Relation Age of Onset    Diabetes Father     Heart Failure Father      Allergies:       Adhesive tape, Ace inhibitors, Iv dye [iodides], and Metformin and related    Medications:      Current Outpatient Medications   Medication Sig Dispense Refill    HUMALOG 100 UNIT/ML injection vial       insulin glargine (BASAGLAR KWIKPEN) 100 UNIT/ML injection pen Inject 73 Units into the skin 2 times daily 10 pen 0    Insulin Pen Needle ([employer] PEN NEEDLES) 31G X 6 MM MISC 1 each by Does not apply route 5 times daily 450 each 3    blood glucose test strips (TRUE METRIX BLOOD GLUCOSE TEST) strip 1 each by In Vitro route daily As needed. 100 each 3    Blood Glucose Monitoring Suppl (TRUE METRIX GO GLUCOSE METER) w/Device KIT 1 each by Does not apply route 4 times daily 1 kit 1    Lancet Device MISC 1 Device by Does not apply route once for 1 dose 100 each 0    Lancets MISC 1 each by Does not apply route daily 100 each 11    insulin aspart (NOVOLOG FLEXPEN) 100 UNIT/ML injection pen Sliding scale three times daily with meals as follows:  Glucose <139  No Insulin; 140-199  3 Units; 200-249  6 Units; 250-299  9 Units; 300-349  12 Units; 350-400  15 Units; Above 400  18 Units 4 pen 0    ELIQUIS 5 MG TABS tablet Take 1 tablet by mouth 2 times daily 60 tablet 0    gabapentin (NEURONTIN) 300 MG capsule Take 1 capsule by mouth 2 times daily for 30 days.  60 capsule 0    traZODone (DESYREL) 50 MG tablet Take 1.5 tablets by mouth nightly 45 tablet 0    furosemide (LASIX) 80 MG tablet Take 1 tablet by mouth 2 times daily 60 tablet 0    febuxostat (ULORIC) 40 MG TABS tablet Take 1 tablet by mouth daily 30 tablet 0    ferrous sulfate (BRIAN-ARCHIE) 325 (65 Fe) MG tablet Take 1 tablet by mouth 2 times daily (with meals) 30 tablet 3    docusate sodium (COLACE) 100 MG capsule Take 1 capsule by mouth 2 times daily 60 capsule 0    polyethylene glycol (GLYCOLAX) 17 g packet Take 17 g by mouth daily 527 g 1    tamsulosin (FLOMAX) 0.4 MG capsule Take 1 capsule by mouth daily 30 capsule 3    atorvastatin (LIPITOR) 80 MG tablet Take 1 tablet by mouth daily 30 tablet 5    aspirin 81 MG chewable tablet Take 1 tablet by mouth daily 30 tablet 0    ranolazine (RANEXA) 1000 MG extended release tablet Take 1 tablet by mouth 2 times daily 60 tablet 0    busPIRone (BUSPAR) 7.5 MG tablet Take 1 tablet by mouth 3 times daily 90 tablet 0    carvedilol (COREG) 6.25 MG tablet Take 1 tablet by mouth 2 times daily 60 tablet 0    pantoprazole (PROTONIX) 40 MG tablet Take 1 tablet by mouth every morning (before breakfast) 30 tablet 0    senna (SENOKOT) 8.6 MG tablet Take 2 tablets by mouth daily as needed (Constipation)       No current facility-administered medications for this visit. Social History:     Tobacco:    reports that she has never smoked. She has never used smokeless tobacco.  Alcohol:      reports no history of alcohol use. Drug Use:  reports no history of drug use. Review of Systems:     Review of Systems   Constitutional: Positive for fatigue. Negative for chills and fever. HENT: Positive for voice change. Negative for ear pain and hearing loss. Hoarseness that has been present since injury during anterior approach for spine surgery   Eyes: Negative for pain and discharge. Respiratory: Negative for shortness of breath and wheezing. Gastrointestinal: Negative for abdominal distention, abdominal pain, nausea and vomiting. Endocrine: Negative for cold intolerance, heat intolerance and polydipsia. Genitourinary: Negative for hematuria. Decreased urination   Musculoskeletal: Positive for myalgias.         Bilateral lower extremity swelling   Skin: Negative for color change and rash. Neurological: Positive for numbness. Negative for dizziness and headaches. Chronic neuropathy in feet     Physical Exam:     Vitals:  BP (!) 148/70 (Site: Right Upper Arm, Position: Sitting, Cuff Size: Large Adult)   Pulse 71   Temp 98.1 °F (36.7 °C) (Temporal)   Resp 18   Ht 5' 5\" (1.651 m)   Wt 244 lb (110.7 kg)   SpO2 95%   BMI 40.60 kg/m²     Physical Exam  Constitutional:       General: She is not in acute distress. Appearance: She is well-developed and well-groomed. She is not diaphoretic. HENT:      Head: Normocephalic and atraumatic. Nose: Nose normal.   Eyes:      General:         Right eye: No discharge. Left eye: No discharge. Extraocular Movements: Extraocular movements intact. Conjunctiva/sclera: Conjunctivae normal.   Cardiovascular:      Rate and Rhythm: Normal rate and regular rhythm. Pulses: Normal pulses. Radial pulses are 2+ on the right side and 2+ on the left side. Pulmonary:      Effort: Pulmonary effort is normal. No respiratory distress. Breath sounds: No wheezing. Chest:      Comments: Tunneled hemodialysis catheter in place  Abdominal:      General: There is no distension. Palpations: Abdomen is soft. Tenderness: There is no abdominal tenderness. There is no guarding. Musculoskeletal:         General: Swelling present. Left upper arm: No swelling or tenderness. Left hand: No swelling or tenderness. Normal strength. Normal sensation. Normal capillary refill. Cervical back: Full passive range of motion without pain and neck supple. Right lower leg: No edema. Left lower leg: No edema. Comments: Bilateral lower extremity chronic swelling   Skin:     General: Skin is warm. Capillary Refill: Capillary refill takes less than 2 seconds. Findings: No rash. Neurological:      Mental Status: She is alert. Mental status is at baseline.       GCS: GCS eye subscore is 4. GCS verbal subscore is 5. GCS motor subscore is 6. Sensory: Sensory deficit present. Motor: Weakness present. Comments: Bilateral hand and feet decreased sensation   RUE weakness compared to LUE   Psychiatric:         Mood and Affect: Mood normal.         Speech: Speech normal.         Behavior: Behavior normal.       Imaging/Labs:               Assessment and Plan:     ESRD on hemodialysis   · Risks and benefits of surgically created dialysis access discussed  · Non-maturation, need for surgical revision/maintenance and ischemic steal (decreased blood flow to hand) discussed  · Consent obtained in the office  · Will plan on using her left arm for fistula creation  · Ok to use tunneled catheter for IV access  · Hold Eliquis 2 days prior  · Do not suspect she was having cardioembolic TIA and do not see utility in keeping her on anticoagulation for this, given age and high risk of bleeding/falls  · Advised them to discuss with neurologist who initiated the anticoagulation    Electronically signed by Romi Guadalupe MD       [address] [place] [address]  Office: [phone]  Cell: [phone]  Email: [email or web address]

## 2021-12-14 ENCOUNTER — HOSPITAL ENCOUNTER (OUTPATIENT)
Age: 61
Setting detail: OUTPATIENT SURGERY
Discharge: HOSPICE | End: 2021-12-14
Attending: SURGERY | Admitting: SURGERY
Payer: COMMERCIAL

## 2021-12-14 ENCOUNTER — ANESTHESIA EVENT (OUTPATIENT)
Dept: OPERATING ROOM | Age: 61
End: 2021-12-14
Payer: COMMERCIAL

## 2021-12-14 ENCOUNTER — ANESTHESIA (OUTPATIENT)
Dept: OPERATING ROOM | Age: 61
End: 2021-12-14
Payer: COMMERCIAL

## 2021-12-14 VITALS
HEART RATE: 63 BPM | DIASTOLIC BLOOD PRESSURE: 62 MMHG | TEMPERATURE: 97.5 F | SYSTOLIC BLOOD PRESSURE: 124 MMHG | OXYGEN SATURATION: 93 % | WEIGHT: 251 LBS | RESPIRATION RATE: 20 BRPM | BODY MASS INDEX: 41.82 KG/M2 | HEIGHT: 65 IN

## 2021-12-14 VITALS — SYSTOLIC BLOOD PRESSURE: 134 MMHG | TEMPERATURE: 96.2 F | OXYGEN SATURATION: 96 % | DIASTOLIC BLOOD PRESSURE: 75 MMHG

## 2021-12-14 LAB
GFR NON-AFRICAN AMERICAN: 23 ML/MIN
GFR NON-AFRICAN AMERICAN: NORMAL ML/MIN
GFR SERPL CREATININE-BSD FRML MDRD: 27 ML/MIN
GFR SERPL CREATININE-BSD FRML MDRD: ABNORMAL ML/MIN/{1.73_M2}
GFR SERPL CREATININE-BSD FRML MDRD: NORMAL ML/MIN
GFR SERPL CREATININE-BSD FRML MDRD: NORMAL ML/MIN/{1.73_M2}
GLUCOSE BLD-MCNC: 314 MG/DL (ref 74–100)
GLUCOSE BLD-MCNC: 317 MG/DL (ref 74–100)
POC BUN: 16 MG/DL (ref 8–26)
POC BUN: NORMAL MG/DL (ref 8–26)
POC CHLORIDE: 106 MMOL/L (ref 98–107)
POC CHLORIDE: 112 MMOL/L (ref 98–107)
POC CREATININE: 2.2 MG/DL (ref 0.51–1.19)
POC CREATININE: NORMAL MG/DL (ref 0.51–1.19)
POC HEMATOCRIT: 33 % (ref 36–46)
POC HEMATOCRIT: NORMAL % (ref 36–46)
POC HEMOGLOBIN: 11.2 G/DL (ref 12–16)
POC HEMOGLOBIN: NORMAL G/DL (ref 12–16)
POC POTASSIUM: 3.9 MMOL/L (ref 3.5–4.5)
POC POTASSIUM: NORMAL MMOL/L (ref 3.5–4.5)
POC SODIUM: 136 MMOL/L (ref 138–146)
POC SODIUM: 141 MMOL/L (ref 138–146)

## 2021-12-14 PROCEDURE — 7100000000 HC PACU RECOVERY - FIRST 15 MIN

## 2021-12-14 PROCEDURE — 84132 ASSAY OF SERUM POTASSIUM: CPT

## 2021-12-14 PROCEDURE — 3700000000 HC ANESTHESIA ATTENDED CARE: Performed by: SURGERY

## 2021-12-14 PROCEDURE — 2580000003 HC RX 258: Performed by: SURGERY

## 2021-12-14 PROCEDURE — 84520 ASSAY OF UREA NITROGEN: CPT

## 2021-12-14 PROCEDURE — 2500000003 HC RX 250 WO HCPCS: Performed by: SURGERY

## 2021-12-14 PROCEDURE — 6360000002 HC RX W HCPCS: Performed by: SURGERY

## 2021-12-14 PROCEDURE — 7100000001 HC PACU RECOVERY - ADDTL 15 MIN

## 2021-12-14 PROCEDURE — 2500000003 HC RX 250 WO HCPCS: Performed by: NURSE ANESTHETIST, CERTIFIED REGISTERED

## 2021-12-14 PROCEDURE — 3700000001 HC ADD 15 MINUTES (ANESTHESIA): Performed by: SURGERY

## 2021-12-14 PROCEDURE — 85014 HEMATOCRIT: CPT

## 2021-12-14 PROCEDURE — 82947 ASSAY GLUCOSE BLOOD QUANT: CPT

## 2021-12-14 PROCEDURE — 93005 ELECTROCARDIOGRAM TRACING: CPT | Performed by: SURGERY

## 2021-12-14 PROCEDURE — 3600000004 HC SURGERY LEVEL 4 BASE: Performed by: SURGERY

## 2021-12-14 PROCEDURE — 3600000014 HC SURGERY LEVEL 4 ADDTL 15MIN: Performed by: SURGERY

## 2021-12-14 PROCEDURE — 6360000002 HC RX W HCPCS: Performed by: NURSE ANESTHETIST, CERTIFIED REGISTERED

## 2021-12-14 PROCEDURE — 82565 ASSAY OF CREATININE: CPT

## 2021-12-14 PROCEDURE — 36821 AV FUSION DIRECT ANY SITE: CPT | Performed by: SURGERY

## 2021-12-14 PROCEDURE — 7100000010 HC PHASE II RECOVERY - FIRST 15 MIN: Performed by: SURGERY

## 2021-12-14 PROCEDURE — 82435 ASSAY OF BLOOD CHLORIDE: CPT

## 2021-12-14 PROCEDURE — 6370000000 HC RX 637 (ALT 250 FOR IP): Performed by: SURGERY

## 2021-12-14 PROCEDURE — 7100000011 HC PHASE II RECOVERY - ADDTL 15 MIN: Performed by: SURGERY

## 2021-12-14 PROCEDURE — 84295 ASSAY OF SERUM SODIUM: CPT

## 2021-12-14 PROCEDURE — 2709999900 HC NON-CHARGEABLE SUPPLY: Performed by: SURGERY

## 2021-12-14 RX ORDER — PROPOFOL 10 MG/ML
INJECTION, EMULSION INTRAVENOUS
Status: COMPLETED
Start: 2021-12-14 | End: 2021-12-14

## 2021-12-14 RX ORDER — FENTANYL CITRATE 50 UG/ML
50 INJECTION, SOLUTION INTRAMUSCULAR; INTRAVENOUS EVERY 5 MIN PRN
Status: DISCONTINUED | OUTPATIENT
Start: 2021-12-14 | End: 2021-12-14 | Stop reason: HOSPADM

## 2021-12-14 RX ORDER — LIDOCAINE HYDROCHLORIDE 10 MG/ML
INJECTION, SOLUTION INFILTRATION; PERINEURAL PRN
Status: DISCONTINUED | OUTPATIENT
Start: 2021-12-14 | End: 2021-12-14 | Stop reason: ALTCHOICE

## 2021-12-14 RX ORDER — MAGNESIUM HYDROXIDE 1200 MG/15ML
LIQUID ORAL CONTINUOUS PRN
Status: COMPLETED | OUTPATIENT
Start: 2021-12-14 | End: 2021-12-14

## 2021-12-14 RX ORDER — FENTANYL CITRATE 50 UG/ML
25 INJECTION, SOLUTION INTRAMUSCULAR; INTRAVENOUS EVERY 5 MIN PRN
Status: DISCONTINUED | OUTPATIENT
Start: 2021-12-14 | End: 2021-12-14 | Stop reason: HOSPADM

## 2021-12-14 RX ORDER — HEPARIN SODIUM 1000 [USP'U]/ML
INJECTION, SOLUTION INTRAVENOUS; SUBCUTANEOUS PRN
Status: DISCONTINUED | OUTPATIENT
Start: 2021-12-14 | End: 2021-12-14 | Stop reason: ALTCHOICE

## 2021-12-14 RX ORDER — HEPARIN SODIUM 1000 [USP'U]/ML
1900 INJECTION, SOLUTION INTRAVENOUS; SUBCUTANEOUS ONCE
Status: COMPLETED | OUTPATIENT
Start: 2021-12-14 | End: 2021-12-14

## 2021-12-14 RX ORDER — SODIUM CHLORIDE 9 MG/ML
INJECTION, SOLUTION INTRAVENOUS CONTINUOUS
Status: DISCONTINUED | OUTPATIENT
Start: 2021-12-14 | End: 2021-12-14 | Stop reason: HOSPADM

## 2021-12-14 RX ORDER — LIDOCAINE HYDROCHLORIDE 10 MG/ML
INJECTION, SOLUTION EPIDURAL; INFILTRATION; INTRACAUDAL; PERINEURAL PRN
Status: DISCONTINUED | OUTPATIENT
Start: 2021-12-14 | End: 2021-12-14 | Stop reason: SDUPTHER

## 2021-12-14 RX ORDER — MIDAZOLAM HYDROCHLORIDE 1 MG/ML
INJECTION INTRAMUSCULAR; INTRAVENOUS PRN
Status: DISCONTINUED | OUTPATIENT
Start: 2021-12-14 | End: 2021-12-14 | Stop reason: SDUPTHER

## 2021-12-14 RX ORDER — ONDANSETRON 2 MG/ML
4 INJECTION INTRAMUSCULAR; INTRAVENOUS
Status: DISCONTINUED | OUTPATIENT
Start: 2021-12-14 | End: 2021-12-14 | Stop reason: HOSPADM

## 2021-12-14 RX ORDER — KETAMINE HYDROCHLORIDE 10 MG/ML
INJECTION, SOLUTION INTRAMUSCULAR; INTRAVENOUS PRN
Status: DISCONTINUED | OUTPATIENT
Start: 2021-12-14 | End: 2021-12-14 | Stop reason: SDUPTHER

## 2021-12-14 RX ORDER — PROPOFOL 10 MG/ML
INJECTION, EMULSION INTRAVENOUS CONTINUOUS PRN
Status: DISCONTINUED | OUTPATIENT
Start: 2021-12-14 | End: 2021-12-14 | Stop reason: SDUPTHER

## 2021-12-14 RX ORDER — FENTANYL CITRATE 50 UG/ML
INJECTION, SOLUTION INTRAMUSCULAR; INTRAVENOUS PRN
Status: DISCONTINUED | OUTPATIENT
Start: 2021-12-14 | End: 2021-12-14 | Stop reason: SDUPTHER

## 2021-12-14 RX ADMIN — PROPOFOL 150 MCG/KG/MIN: 10 INJECTION, EMULSION INTRAVENOUS at 12:10

## 2021-12-14 RX ADMIN — SODIUM CHLORIDE: 900 INJECTION, SOLUTION INTRAVENOUS at 12:07

## 2021-12-14 RX ADMIN — FENTANYL CITRATE 25 MCG: 50 INJECTION, SOLUTION INTRAMUSCULAR; INTRAVENOUS at 12:36

## 2021-12-14 RX ADMIN — HEPARIN SODIUM 5000 UNITS: 1000 INJECTION INTRAVENOUS; SUBCUTANEOUS at 12:53

## 2021-12-14 RX ADMIN — KETAMINE HYDROCHLORIDE 30 MG: 10 INJECTION INTRAMUSCULAR; INTRAVENOUS at 12:28

## 2021-12-14 RX ADMIN — KETAMINE HYDROCHLORIDE 10 MG: 10 INJECTION INTRAMUSCULAR; INTRAVENOUS at 13:15

## 2021-12-14 RX ADMIN — KETAMINE HYDROCHLORIDE 10 MG: 10 INJECTION INTRAMUSCULAR; INTRAVENOUS at 12:54

## 2021-12-14 RX ADMIN — HEPARIN SODIUM 1900 UNITS: 1000 INJECTION INTRAVENOUS; SUBCUTANEOUS at 14:28

## 2021-12-14 RX ADMIN — FENTANYL CITRATE 50 MCG: 50 INJECTION, SOLUTION INTRAMUSCULAR; INTRAVENOUS at 13:24

## 2021-12-14 RX ADMIN — CEFAZOLIN 2000 MG: 10 INJECTION, POWDER, FOR SOLUTION INTRAVENOUS at 12:15

## 2021-12-14 RX ADMIN — LIDOCAINE HYDROCHLORIDE 50 MG: 10 INJECTION, SOLUTION EPIDURAL; INFILTRATION; INTRACAUDAL; PERINEURAL at 12:10

## 2021-12-14 RX ADMIN — MIDAZOLAM HYDROCHLORIDE 2 MG: 1 INJECTION, SOLUTION INTRAMUSCULAR; INTRAVENOUS at 12:07

## 2021-12-14 RX ADMIN — FENTANYL CITRATE 25 MCG: 50 INJECTION, SOLUTION INTRAMUSCULAR; INTRAVENOUS at 13:12

## 2021-12-14 ASSESSMENT — PULMONARY FUNCTION TESTS
PIF_VALUE: 1
PIF_VALUE: 2
PIF_VALUE: 1
PIF_VALUE: 1
PIF_VALUE: 2
PIF_VALUE: 1
PIF_VALUE: 2
PIF_VALUE: 1
PIF_VALUE: 2
PIF_VALUE: 1
PIF_VALUE: 2
PIF_VALUE: 1
PIF_VALUE: 3
PIF_VALUE: 1
PIF_VALUE: 2
PIF_VALUE: 2
PIF_VALUE: 1
PIF_VALUE: 2
PIF_VALUE: 1
PIF_VALUE: 3
PIF_VALUE: 1
PIF_VALUE: 2
PIF_VALUE: 1

## 2021-12-14 NOTE — H&P
H&P  Surgery        Pt Name: Luzma Henderson  MRN: [id number]  YOB: 1960  Date of evaluation: 12/14/2021      [x] I have examined the patient and reviewed the H&P/Consult completed 12/6/21, and there are no changes to the patient or plans. Proceed with LUE AV fistula creation. [] I have examined the patient and reviewed the H&P/Consult and have noted the following changes:     I have included the previous office H&P below:    Eli Hart,   General Surgery PGY-3          Division of Vascular Surgery          New Consult     Physician Requesting Consult:  Dr. Mika Ramirez     Reason for Consult: HD access      Chief Complaint:      ESRD  Need for hemodialysis access     History of Present Illness:      Luzma Henderson is a 59 y.o. right handed woman with ESRD secondary to diabetic and hypertensive nephropathy on hemodialysis Monday/Wednesday/Friday at [facility] through a tunneled catheter. She denies any issues with use of the tunneled catheter. She has had multiple episodes of passing out and then losing her memory during dialysis and family also endorses that she has episodes memory loss intermittently since she started dialysis. Also she has developed right upper extremity weakness and pain after a recent dialysis session. She feels her right upper extremity symptoms continue to improve. Stroke workup in September 2021 did not reveal any acute stroke. Reports of possible stroke in August 2021 at outlying facility.      No left upper extremity coolness, weakness, or decreased range of motion.  She does endorse some intermittent left hand numbness after dialysis sessions.      Medical History:      Past Medical History        Past Medical History:   Diagnosis Date    Backache, unspecified      CHF (congestive heart failure) (HCC)      Chronic kidney disease      Coronary atherosclerosis of artery bypass graft      Cramp of limb      Gallstones      Hypertension      Insomnia    Pneumonia      Type II or unspecified type diabetes mellitus with renal manifestations, not stated as uncontrolled(250.40)      Type II or unspecified type diabetes mellitus without mention of complication, not stated as uncontrolled      Unspecified vitamin D deficiency              Surgical History:      Past Surgical History         Past Surgical History:   Procedure Laterality Date    ANKLE FRACTURE SURGERY        BREAST SURGERY        CARPAL TUNNEL RELEASE        CHOLECYSTECTOMY, OPEN N/A      CORONARY ARTERY BYPASS GRAFT         x3    HAND SURGERY        IR TUNNELED CATHETER PLACEMENT GREATER THAN 5 YEARS   8/18/2021     IR TUNNELED CATHETER PLACEMENT GREATER THAN 5 YEARS 8/18/2021 [facility] PROCEDURES    KNEE ARTHROSCOPY         right    TONSILLECTOMY                Family History:      Family History         Family History   Problem Relation Age of Onset    Diabetes Father      Heart Failure Father           Allergies:       Adhesive tape, Ace inhibitors, Iv dye [iodides], and Metformin and related     Medications:      Current Facility-Administered Medications          Current Outpatient Medications   Medication Sig Dispense Refill    HUMALOG 100 UNIT/ML injection vial          insulin glargine (BASAGLAR KWIKPEN) 100 UNIT/ML injection pen Inject 73 Units into the skin 2 times daily 10 pen 0    Insulin Pen Needle ([employer] PEN NEEDLES) 31G X 6 MM MISC 1 each by Does not apply route 5 times daily 450 each 3    blood glucose test strips (TRUE METRIX BLOOD GLUCOSE TEST) strip 1 each by In Vitro route daily As needed.  100 each 3    Blood Glucose Monitoring Suppl (TRUE METRIX GO GLUCOSE METER) w/Device KIT 1 each by Does not apply route 4 times daily 1 kit 1    Lancet Device MISC 1 Device by Does not apply route once for 1 dose 100 each 0    Lancets MISC 1 each by Does not apply route daily 100 each 11    insulin aspart (NOVOLOG FLEXPEN) 100 UNIT/ML injection pen Sliding scale three Constitutional: Positive for fatigue. Negative for chills and fever. HENT: Positive for voice change. Negative for ear pain and hearing loss. Hoarseness that has been present since injury during anterior approach for spine surgery   Eyes: Negative for pain and discharge. Respiratory: Negative for shortness of breath and wheezing. Gastrointestinal: Negative for abdominal distention, abdominal pain, nausea and vomiting. Endocrine: Negative for cold intolerance, heat intolerance and polydipsia. Genitourinary: Negative for hematuria. Decreased urination   Musculoskeletal: Positive for myalgias. Bilateral lower extremity swelling   Skin: Negative for color change and rash. Neurological: Positive for numbness. Negative for dizziness and headaches. Chronic neuropathy in feet      Physical Exam:      Vitals:  BP (!) 148/70 (Site: Right Upper Arm, Position: Sitting, Cuff Size: Large Adult)   Pulse 71   Temp 98.1 °F (36.7 °C) (Temporal)   Resp 18   Ht 5' 5\" (1.651 m)   Wt 244 lb (110.7 kg)   SpO2 95%   BMI 40.60 kg/m²      Physical Exam  Constitutional:       General: She is not in acute distress. Appearance: She is well-developed and well-groomed. She is not diaphoretic. HENT:      Head: Normocephalic and atraumatic. Nose: Nose normal.   Eyes:      General:         Right eye: No discharge. Left eye: No discharge. Extraocular Movements: Extraocular movements intact. Conjunctiva/sclera: Conjunctivae normal.   Cardiovascular:      Rate and Rhythm: Normal rate and regular rhythm. Pulses: Normal pulses. Radial pulses are 2+ on the right side and 2+ on the left side. Pulmonary:      Effort: Pulmonary effort is normal. No respiratory distress. Breath sounds: No wheezing. Chest:      Comments: Tunneled hemodialysis catheter in place  Abdominal:      General: There is no distension. Palpations: Abdomen is soft. Tenderness: There is no abdominal tenderness. There is no guarding. Musculoskeletal:         General: Swelling present. Left upper arm: No swelling or tenderness. Left hand: No swelling or tenderness. Normal strength. Normal sensation. Normal capillary refill. Cervical back: Full passive range of motion without pain and neck supple. Right lower leg: No edema. Left lower leg: No edema. Comments: Bilateral lower extremity chronic swelling   Skin:     General: Skin is warm. Capillary Refill: Capillary refill takes less than 2 seconds. Findings: No rash. Neurological:      Mental Status: She is alert. Mental status is at baseline. GCS: GCS eye subscore is 4. GCS verbal subscore is 5. GCS motor subscore is 6. Sensory: Sensory deficit present. Motor: Weakness present. Comments: Bilateral hand and feet decreased sensation   RUE weakness compared to LUE   Psychiatric:         Mood and Affect: Mood normal.         Speech: Speech normal.         Behavior: Behavior normal.         Imaging/Labs:                    Assessment and Plan:      ESRD on hemodialysis   ? Risks and benefits of surgically created dialysis access discussed  § Non-maturation, need for surgical revision/maintenance and ischemic steal (decreased blood flow to hand) discussed  § Consent obtained in the office  ? Will plan on using her left arm for fistula creation  ? Ok to use tunneled catheter for IV access  ?  Hold Eliquis 2 days prior  § Do not suspect she was having cardioembolic TIA and do not see utility in keeping her on anticoagulation for this, given age and high risk of bleeding/falls  § Advised them to discuss with neurologist who initiated the anticoagulation     Electronically signed by Etta Ramsey  Office: [phone]  Cell: [phone]

## 2021-12-14 NOTE — ANESTHESIA POSTPROCEDURE EVALUATION
Department of Anesthesiology  Postprocedure Note    Patient: Luzma Henderson  MRN: [id number]  YOB: 1960  Date of evaluation: 12/14/2021  Time:  3:07 PM     Procedure Summary     Date: 12/14/21 Room / Location: [address] [place] [address]    Anesthesia Start: 1207 Anesthesia Stop: 0066    Procedure: LEFT AV FISTULA CREATION UPPER EXTREMITY (Left ) Diagnosis: (END STAGE RENAL DISEASE)    Surgeons: Guanakito Manjarrez MD Responsible Provider: Tianna Dockery MD    Anesthesia Type: MAC ASA Status: 4          Anesthesia Type: MAC    Rambo Phase I: Rambo Score: 10    Rambo Phase II: Rambo Score: 9    Last vitals: Reviewed and per EMR flowsheets.      POST-OP ANESTHESIA NOTE       /62   Pulse 63   Temp 97.5 °F (36.4 °C) (Oral)   Resp 20   Ht 5' 5\" (1.651 m)   Wt 251 lb (113.9 kg)   SpO2 93%   BMI 41.77 kg/m²                Anesthesia Post Evaluation    Patient location during evaluation: PACU  Patient participation: complete - patient participated  Level of consciousness: awake  Pain score: 0  Airway patency: patent  Nausea & Vomiting: no vomiting and no nausea  Complications: no  Cardiovascular status: hemodynamically stable  Respiratory status: acceptable  Hydration status: stable

## 2021-12-14 NOTE — OP NOTE
Operative Note      Patient: Luzma Henderson  YOB: 1960  MRN: [id number]    Date of Procedure: 12/14/2021    Pre-Op Diagnosis: END STAGE RENAL DISEASE    Post-Op Diagnosis: Same       Procedure: Left radial artery to cephalic vein AV fistula creation (antecubital fossa)    Surgeon(s): Guanakito Manjarrez MD    Assistant: Dr. Sidra Garrido: Monitor Anesthesia Care, local    Estimated Blood Loss (mL): 23 ml    Complications: None    Specimens: none    Implants: none      Drains: None    Findings: Weak thrill over AV fistula, doppler radial artery signals at completion    Detailed Description of Procedure:     Mrs. Richa Lewis was brought to the operating room today for long term hemodialysis access creation. After appropriate anesthesia delivered, the left arm was prepped and draped in standard sterile fashion. Timeout performed and agreed upon, antibiotics given during this period. I began by evaluating the cephalic vein and marking it out with ultrasound. Local anesthesia delivered and a small transverse incision below the antecubital fossa was created. Electrocautery used to get thru the subcutaneous tissue. The cephalic vein along with any branches were sharply dissected out for a several centimeters. Cephalic vein required repair with 7-0 proline secondary to inadvertent venotomy. Next I dissected out the brachial artery and the bifurcation by going thru the antecubital sheath, vessel loops placed for proximal and distal control. 5000 units of heparin delivered and allowed to circulate. The distal end of the cephalic vein was then ligated and transected with silk ties. The vein had good back bleeding, the vein was flushed and yazagil applied to control back bleeding. The radial artery was controlled by pulling up on the vessel loops, arteriotomy created and extended with le scissors.   The cephalic vein was then spatulated and an end to side anastomosis performed to the radial artery using 7-0 proline. Prior to completion, usual flushing maneuvers performed and the anastomosis was hemostatic. There was a weak thrill over the fistula and a doppler radial artery signal at completion. The wound bed was irrigated and dried, it was hemostatic. The incision was closed in layers of interrupted vicryl and running monocryl. Steristrip and sterile dressings applied, patient tolerated procedure well and was brought to his room for recovery.       Electronically signed by Peterson Price MD on 12/14/2021 at 1:33 PM

## 2021-12-14 NOTE — ANESTHESIA PRE PROCEDURE
Department of Anesthesiology  Preprocedure Note       Name:  Ian Pacheco   Age:  59 y.o.  :  1960                                          MRN:  [id number]         Date:  2021      Surgeon: Weston Ayala): Elgin Interiano MD    Procedure: Procedure(s):  AV FISTULA CREATION UPPER EXTREMITY    Medications prior to admission:   Prior to Admission medications    Medication Sig Start Date End Date Taking? Authorizing Provider   HUMALOG 100 UNIT/ML injection vial  21   Historical Provider, MD   insulin glargine (BASAGLAR KWIKPEN) 100 UNIT/ML injection pen Inject 73 Units into the skin 2 times daily 21   AFSANEH Vargas - CNP   Insulin Pen Needle ([employer] PEN NEEDLES) 31G X 6 MM MISC 1 each by Does not apply route 5 times daily 21  AFSANEH Vargas CNP   blood glucose test strips (TRUE METRIX BLOOD GLUCOSE TEST) strip 1 each by In Vitro route daily As needed. 21   AFSANEH Vargas CNP   Blood Glucose Monitoring Suppl (TRUE METRIX GO GLUCOSE METER) w/Device KIT 1 each by Does not apply route 4 times daily 21   AFSANEH Vargas - CNP   Lancet Device MISC 1 Device by Does not apply route once for 1 dose 21  AFSANEH Vargas - CNP   Lancets MISC 1 each by Does not apply route daily 21   AFSANEH Vargas CNP   insulin aspart (NOVOLOG FLEXPEN) 100 UNIT/ML injection pen Sliding scale three times daily with meals as follows:  Glucose <139  No Insulin; 140-199  3 Units; 200-249  6 Units; 250-299  9 Units; 300-349  12 Units; 350-400  15 Units; Above 400  18 Units 21   AFSANEH Vargas CNP   ELIQUIS 5 MG TABS tablet Take 1 tablet by mouth 2 times daily 21   Charly Vallejo MD   gabapentin (NEURONTIN) 300 MG capsule Take 1 capsule by mouth 2 times daily for 30 days.  21  Charly Vallejo MD   traZODone (DESYREL) 50 MG tablet Take 1.5 tablets by mouth nightly 21   Charly Vallejo MD   furosemide (LASIX) 80 MG tablet Take 1 tablet by mouth 2 times daily 11/22/21   Kelly Simon MD   febuxostat (ULORIC) 40 MG TABS tablet Take 1 tablet by mouth daily 11/22/21   Kelly Simon MD   ferrous sulfate (BRIAN-ARCHIE) 325 (65 Fe) MG tablet Take 1 tablet by mouth 2 times daily (with meals) 11/22/21   Kelly Simon MD   docusate sodium (COLACE) 100 MG capsule Take 1 capsule by mouth 2 times daily 11/22/21   Kelly Simon MD   polyethylene glycol (GLYCOLAX) 17 g packet Take 17 g by mouth daily 11/23/21 12/23/21  Kelly Simon MD   tamsulosin [facility]) 0.4 MG capsule Take 1 capsule by mouth daily 11/23/21   Kelly Simon MD   atorvastatin (LIPITOR) 80 MG tablet Take 1 tablet by mouth daily 11/3/21   AFSANEH Petersen CNP   aspirin 81 MG chewable tablet Take 1 tablet by mouth daily 9/25/21   Ousmane Duarte MD   ranolazine (RANEXA) 1000 MG extended release tablet Take 1 tablet by mouth 2 times daily 9/25/21   Ousmane Duarte MD   busPIRone (BUSPAR) 7.5 MG tablet Take 1 tablet by mouth 3 times daily 9/25/21   Ousmane Duarte MD   carvedilol (COREG) 6.25 MG tablet Take 1 tablet by mouth 2 times daily 9/25/21   Ousmane Duarte MD   pantoprazole (PROTONIX) 40 MG tablet Take 1 tablet by mouth every morning (before breakfast) 9/25/21   Ousmane Duarte MD   senna (SENOKOT) 8.6 MG tablet Take 2 tablets by mouth daily as needed (Constipation) 8/25/21   Ousmane Duarte MD   allopurinol (ZYLOPRIM) 100 MG tablet Take 1 tablet by mouth daily 4/14/21   AFSANEH Petersen CNP       Current medications:    Current Facility-Administered Medications   Medication Dose Route Frequency Provider Last Rate Last Admin    0.9 % sodium chloride infusion   IntraVENous Continuous Jairo Vegas MD        propofol 200 MG/20ML injection             propofol 1000 MG/100ML injection                Allergies:     Allergies   Allergen Reactions    Adhesive Tape Other (See Comments) and Rash     Other reaction(s): Unknown    Ace Inhibitors Other (See Comments)     cough    Iv Dye [Iodides]      Stage 4 kidney disease    Metformin And Related Hives, Itching and Rash       Problem List:    Patient Active Problem List   Diagnosis Code    Atherosclerosis of coronary artery bypass graft of native heart without angina pectoris I25.810    Acute renal failure ([facility]) N17.9    Diabetes mellitus due to underlying condition with diabetic nephropathy, with long-term current use of insulin ([facility]) E08.21, Z79.4    Chronic diastolic heart failure ([facility]) I50.32    Diabetic polyneuropathy associated with type 2 diabetes mellitus ([facility]) E11.42    History of coronary artery bypass graft Z95.1    Iron deficiency anemia D50.9    Spinal stenosis of lumbar region with neurogenic claudication M48.062    Mixed hyperlipidemia E78.2    Stage 4 chronic kidney disease ([facility]) N18.4    Type 2 diabetes mellitus with kidney complication, with long-term current use of insulin ([facility]) E11.29, Z79.4    Syncope and collapse R55    Obesity, Class II, BMI 35-39.9 E66.9    Thyroid nodule greater than or equal to 1 cm in diameter incidentally noted on imaging study E04.1    Essential hypertension I10    Anemia in stage 4 chronic kidney disease (HCC) N18.4, D63.1    Chronic ischemic heart disease I25.9    Ischemic stroke of frontal lobe ([facility]) I63.9    Stroke-like symptoms R29.90    Morbid obesity with BMI of 40.0-44.9, adult ([facility]) E66.01, Z68.41    Acute encephalopathy G93.40    Disequilibrium syndrome E87.8    Debility R53.81    Long-term memory loss R41.3    Muscle right arm weakness M62.81    Anxiety F41.9    Chronic midline low back pain with bilateral sciatica M54.41, M54.42, G89.29    Need for immunization against influenza Z23    Altered mental status R41.82       Past Medical History:        Diagnosis Date    Backache, unspecified     CHF (congestive heart failure) ([facility])     Chronic kidney disease     Coronary atherosclerosis of artery bypass graft     Cramp of limb     Gallstones     Hypertension     Insomnia     Pneumonia     Type II or unspecified type diabetes mellitus with renal manifestations, not stated as uncontrolled(250.40)     Type II or unspecified type diabetes mellitus without mention of complication, not stated as uncontrolled     Unspecified vitamin D deficiency        Past Surgical History:        Procedure Laterality Date    ANKLE FRACTURE SURGERY      BREAST SURGERY      CARPAL TUNNEL RELEASE      CHOLECYSTECTOMY, OPEN N/A     CORONARY ARTERY BYPASS GRAFT      x3    HAND SURGERY      IR TUNNELED CATHETER PLACEMENT GREATER THAN 5 YEARS  8/18/2021    IR TUNNELED CATHETER PLACEMENT GREATER THAN 5 YEARS 8/18/2021 [facility] PROCED[facility]    KNEE ARTHROSCOPY      right    TONSILLECTOMY         Social History:    Social History     Tobacco Use    Smoking status: Never Smoker    Smokeless tobacco: Never Used   Substance Use Topics    Alcohol use: No                                Counseling given: Not Answered      Vital Signs (Current): There were no vitals filed for this visit.                                            BP Readings from Last 3 Encounters:   12/06/21 (!) 150/70   11/30/21 130/66   11/23/21 (!) 143/68       NPO Status:                                                                                 BMI:   Wt Readings from Last 3 Encounters:   12/06/21 244 lb (110.7 kg)   11/30/21 247 lb (112 kg)   11/22/21 244 lb 7.8 oz (110.9 kg)     There is no height or weight on file to calculate BMI.    CBC:   Lab Results   Component Value Date    WBC 6.5 11/17/2021    RBC 3.53 11/17/2021    HGB 11.7 11/17/2021    HCT 34.5 11/17/2021    MCV 97.8 11/17/2021    RDW 14.9 11/17/2021     11/17/2021       CMP:   Lab Results   Component Value Date     11/22/2021    K 4.7 11/22/2021     11/22/2021    CO2 24 11/22/2021    BUN 56 11/22/2021    CREATININE 3.42 11/22/2021    GFRAA 16 11/22/2021    LABGLOM 14 11/22/2021 GLUCOSE 189 11/22/2021    PROT 6.4 09/17/2021    CALCIUM 9.5 11/22/2021    BILITOT 0.40 09/17/2021    ALKPHOS 98 09/17/2021    AST 16 09/17/2021    ALT 13 09/17/2021       POC Tests: No results for input(s): POCGLU, POCNA, POCK, POCCL, POCBUN, POCHEMO, POCHCT in the last 72 hours. Coags:   Lab Results   Component Value Date    PROTIME 12.7 11/10/2021    PROTIME 16.4 08/27/2015    PROTIME 16.4 08/27/2015    INR 0.9 11/10/2021    APTT 27.5 11/10/2021       HCG (If Applicable): No results found for: PREGTESTUR, PREGSERUM, HCG, HCGQUANT     ABGs: No results found for: PHART, PO2ART, LUX8DUS, KSR4GHF, BEART, R5OSSQBN     Type & Screen (If Applicable):  No results found for: LABABO, LABRH    Drug/Infectious Status (If Applicable):  No results found for: HIV, HEPCAB    COVID-19 Screening (If Applicable): No results found for: COVID19    TTE 9/17/2021  Limited study for stroke, dyspnea and hypoxemia. Normal left ventricle size and function with an estimated EF 55%. Negative bubble study, no shunt noted. No obvious valvular abnormality seen    EKG 12/14/2021   Normal sinus rhythm  Cannot rule out Anterior infarct , age undetermined  Abnormal ECG  No previous ECGs available        Anesthesia Evaluation    Airway: Mallampati: III  TM distance: >3 FB   Neck ROM: full  Mouth opening: > = 3 FB Dental:    (+) other      Pulmonary:normal exam    (+) pneumonia:                             Cardiovascular:    (+) hypertension:, CAD:, CABG/stent:, CHF:,                   Neuro/Psych:   (+) CVA:, neuromuscular disease:,             GI/Hepatic/Renal: Neg GI/Hepatic/Renal ROS  (+) renal disease: ESRD and dialysis,           Endo/Other:    (+) DiabetesType II DM, poorly controlled, using insulin, . Abdominal:             Vascular: Other Findings:           Anesthesia Plan      MAC     ASA 4       Induction: intravenous. MIPS: Postoperative opioids intended.   Anesthetic plan and risks discussed with patient. Plan discussed with CRNA.                 Kelin Hurst MD   12/14/2021 none

## 2021-12-14 NOTE — PROGRESS NOTES
Patient admitted, consent signed and questions answered. Patient ready for procedure. Call light to reach with side rails up 2 of 2.2% Chlorhexidine cloths used to prep site  Family at bedside with patient. History and physical needs to be completed.

## 2021-12-15 ENCOUNTER — TELEPHONE (OUTPATIENT)
Dept: VASCULAR SURGERY | Age: 61
End: 2021-12-15

## 2021-12-15 LAB
EKG ATRIAL RATE: 65 BPM
EKG P AXIS: 65 DEGREES
EKG P-R INTERVAL: 162 MS
EKG Q-T INTERVAL: 462 MS
EKG QRS DURATION: 100 MS
EKG QTC CALCULATION (BAZETT): 480 MS
EKG R AXIS: 54 DEGREES
EKG T AXIS: 130 DEGREES
EKG VENTRICULAR RATE: 65 BPM

## 2021-12-15 PROCEDURE — 93010 ELECTROCARDIOGRAM REPORT: CPT | Performed by: INTERNAL MEDICINE

## 2021-12-15 NOTE — TELEPHONE ENCOUNTER
----- Message from Senait Alvarez MA sent at 12/15/2021  8:46 AM EST -----  Scheduled with patient  ----- Message -----  From: Nancy Morgan MD  Sent: 12/14/2021   1:37 PM EST  To: [facility] Clinical Staff    1 month postop s/p LUE AV fistula creation

## 2021-12-16 ENCOUNTER — HOSPITAL ENCOUNTER (OUTPATIENT)
Dept: OCCUPATIONAL THERAPY | Age: 61
Setting detail: THERAPIES SERIES
Discharge: HOME | End: 2021-12-16
Payer: COMMERCIAL

## 2021-12-16 ENCOUNTER — HOSPITAL ENCOUNTER (OUTPATIENT)
Dept: PHYSICAL THERAPY | Age: 61
Setting detail: THERAPIES SERIES
Discharge: HOME | End: 2021-12-16
Payer: COMMERCIAL

## 2021-12-16 PROCEDURE — 97166 OT EVAL MOD COMPLEX 45 MIN: CPT

## 2021-12-16 PROCEDURE — 97110 THERAPEUTIC EXERCISES: CPT

## 2021-12-16 PROCEDURE — 97163 PT EVAL HIGH COMPLEX 45 MIN: CPT

## 2021-12-16 ASSESSMENT — PAIN DESCRIPTION - DESCRIPTORS: DESCRIPTORS: NUMBNESS

## 2021-12-16 ASSESSMENT — 9 HOLE PEG TEST
TEST_RESULT: IMPAIRED
TESTTIME_SECONDS: 24
TESTTIME_SECONDS: 27

## 2021-12-16 ASSESSMENT — PAIN DESCRIPTION - PAIN TYPE: TYPE: ACUTE PAIN

## 2021-12-16 ASSESSMENT — PAIN DESCRIPTION - LOCATION: LOCATION: HAND

## 2021-12-16 ASSESSMENT — PAIN SCALES - GENERAL: PAINLEVEL_OUTOF10: 5

## 2021-12-16 ASSESSMENT — PAIN DESCRIPTION - ORIENTATION: ORIENTATION: RIGHT

## 2021-12-16 NOTE — CONSULTS
[x] [facility]  [address]  [place]: [phone]   F: [phone]        Physical Therapy Neurological Evaluation    Date:  2021  Patient: Jonn Marsh  : 1960  MRN: [id number]  Physician: Kyle Churchill MD & AFSANEH Talavera-CNP   Insurance: [employer] [place]  combined with OT. [employer]  Medical Diagnosis: I63.9 (ICD-10-CM) - Cerebrovascular accident (CVA), unspecified mechanism ([facility] [address]   Rehab Codes: Z74.0 reduced mobility, R53 weakness, R 53. Deconditioning, R29.6 fall risk   Date of symptom onset: initial CVA 21  Next 's appt.: 21 - Dr. Humphrey Puls     Precautions: no lifting with LUE; not allowed to get L arm wet, no BP can be taken in L arm     Subjective:   Pt arrives to clinic ambulatory with rollator after OT eval. Pt is agreeable to PT evaluation. Pt was previously seen here for aquatic PT -. She notes she had CVA causing R sided weakness 21. She wad treated at [place] [address] and then was sent into stage 5 kidney failure from having CT with dye. Since then has been on dialysis and has continued with dialysis 3x/wk (MWF). She then completed ARU -. She then returned to the ER due to episode of LOS & confusion at dialysis 21. She was admitted again to ARU -. She then returned to the ED again with AMS 11/10/21 -11/15/21. She again completed ARU -. Recently she had a fistual placed  in LUE that is currently healing. She still has a R sided chest port for dialysis for ~4 more weeks. From her recent medical issues, pt notes main deficits of R sided sensory changes, impaired endurance, decreased strength, and impaired balance. Reports hyposensitivity in (B) hands and feet that has lead to more hyposensitivity from her baseline neuropathy. She also notes proprioceptive changes in R fingers and R feet. She has hypersensitivity in her R upper shin.  She is now using a rollator for mobility and has to take frequent rest breaks. Also reports memory deficits with increased difficulty for recall. She is currently writing down all her activities and memories in journal which as been helping. Patient stated Goals:   - wants to get rid of walker   - wants to improve balance   - getting back to doing things like ADLs  - be able to walk in the store iwthout using a grocery cart     Pain:  [x] Yes  [] No Location: L forearm fistula   Pain Rating: (0-10 scale) 5/10  Pain altered Tx:  [] Yes  [] No  Action:    Previous PT hx:   - was in PT prior to CVA -- aquatic therapy   - did ARU multiple times   - did home health therapy post hospitalizations       PMHx: [] Unremarkable [x] Diabetes [x] HTN  [] Pacemaker   [x] MI/Heart Problems (CHF) [] Cancer [] Arthritis [] Other:              [x] Refer to full medical chart  In EPIC     Comorbidities:   [x] Obesity [x] Dialysis  [x] Other: anxiety   [] Asthma/COPD [] Dementia [x] Other: CABG    [x] Stroke [] Sleep apnea [x] Other: diabetic neuropathy B hands & feet    [] Vascular disease [] Rheumatic disease [] Other:     Tests/Imaging:   Most recent MRI 11/11:   Impression       Stable study.  There is no acute infarction, intracranial hemorrhage, or   intracranial mass lesion.       Mild chronic microangiopathic ischemic disease.       Mild generalized volume loss.       Chronic encephalomalacia involving the left occipital lobe.      Medications: [x] Refer to full medical record [] None [] Other:  Allergies:       [x] Refer to full medical record [] None [] Other:      Function:  Hand Dominance  [x] Right  [] Left    Home Environment:   Patient lives with:  her mother -- assists in mother's care     In what type of home [x]  One story condo   [] Two story   [] Split level   Number of stairs to enter  ramp entry    Handrail:    []  Right to enter   [] Left to enter    Stairs within the home   0        Handrails: []  Right to ascending  [] Left to ascending Bathroom has a []  Tub only  [] Tub/shower combo   [x] Walk in shower    []  Grab bars    * has a shower that she has to step ~6 in over        Washing machine is on [x]  Main level   [] Second level   [] Basement   Employer/Job Retired       [address] [facility]:  Current DME available: rollator, SPC , WC for long distances    ADL/IADL Previous level of function Current level of function Who currently assists the patient with task/Comments   Bathing  [x] Independent  [] Assist [] Independent  [x] Assist Using mom's WIS & using a shower chair & HHA; able to wash hair in sink, cant shower due to fistula   Dress/grooming [x] Independent  [] Assist [x] Independent  [] Assist Sitting to get dressed    Transfer/ bed mobility [x] Independent  [] Assist [] Independent  [] Assist Sleeping in recliner   Feeding [x] Independent  [] Assist [x] Independent  [] Assist Difficulty with feeling the spoon in R handed    Toileting [x] Independent  [] Assist [x] Independent  [] Assist    Driving [x] Independent  [] Assist [] Independent  [x] Assist Not driving currently    Housekeeping [x] Independent  [] Assist [] Independent  [x] Assist Doing very limited tasks; sisters are helpful   Grocery shop/meal prep [x] Independent  [] Assist [] Independent  [x] Assist Sisters go and help    Ambulation [x] Independent  [] Assist  [] Independent  [x] Assist  Uses rollator        Objective:    POSTURE No deficit Deficit Not Tested Comments   Kyphosis [] [x] []    Scoliosis [] [] []    Forward Head [] [x] []    Rounded Shldrs [] [x] []    Slumped Sitting [] [x] []    Skin Integrity [] [x] [] Has a healing L fistula           NEUROLOGICAL       Reflexes [] [] []    Sensation [] [x] [] Neuropathy B hands & feet -- feet more numb; decreased sensation and proprioception R fingers 2-4; no light touch sensation B feet; hyposensitive B ankles; R anterior shin hypersensitive to light touch  - limited proprioceptive awareness of R ankle (inconsistent, seems to be guessing)    Bladder/Bowel [x] [] []    Coordination [x] [] [] Heel to shin limited by weakness; good LELAND     Tone [x] [] []    Clonus [x] [] []    Tremors [x] [] []             done in seated  Strength  Left Strength  Right   Hip Flexion 4+ 3   Hip Abduction 4 3+   Hip Adduction 4+ 4+   Hip Extension 4+ 4   Knee Flexion 4 3+   Knee Extension 4 4   Dorsiflexion 4 4   Plantar flexion 4 4      Spasticity/tone:  none noted       FUNCTION Level of assistance Comments/observations   Bed Mobility Did not assess this date as sleeps in recliner     -rolling     -sit to supine     -supine to sit     Transfers     -sit to stand supervision Need for RUE support; mild imbalance with initial standing noted with 5xSTS   -pivot supervision Mild instability likely due to proprioceptive deficits at feet    Balance     -sitting static IND     - sitting dynamic IND     -standing static superivision    - standing dynamic Supervision to CGA     Ambulation Mod IND with device    Distance/Device: rollator, >100ft    Analysis:   Forward flexed posture, decreased clement, decreased push off, limited terminal hip extensions      Functional outcome measures:     Functional Patient Reported Outcome Assessment Used:  SIS-16   Current Status Score: 53/80  Goal Status Score: 60/80    Outcome Measures  12/16/21    BROWN Will assess at later session     5xSTS 29.82 with RUE use; mild imbalance     10mWT 14.18s >> 0.7m/s     6 minute walk test  Will assess at later session     FGA     MiniBEST          Gait speed Interpretation: Limited household ambulator  (0-0.4 m/s)        Limited community ambulator (0.4m/s-0.8m/s)        Community ambulator (0.8m/s-1.2 m/s)        Unrestricted community ambulator/normal walking speed (>1.2m/s)        Assessment:    Pt with a significant medical hx including recent CVA, kidney failure now on dialysis, and episodes of encephlaoptahy  in the last 4-5 months presents to therapy with decreased mobility, limited sensation, decreased strength, and limited balance that has impacted her ADLs and functional mobility. She was previously independent & caring for her mother but now she is needing to use a rollator and requiring intermittent assist herself due to limited capacity. Her strength deficits seem more global from decreased activity in the past couple months vs true hemiparesis from her CVA. She presents as a high fall risk given her sensory deficits and time and instability noted on the 5xSTS. She is also going to require close monitoring given being on dialysis and have multiple episodes of LOC and AMS with dialysis the past couple months. Her gait speed is impacted by her deficits as she now scores as a limited community ambulator, but she has goals to improve her mobility in the community to lead to greater independence. Her memory deficits did not as apparent as her strength and sensory deficits, but she does sometimes take increased time for recall of words. During the evaluation she does express frustration and sadness about her current state, but at the same time is grateful she is here with no extensive deficits. Feel she will require an extended plan of care addressing strength, balance, gait, and endurance to help her gain more capacity and independence with her mobility & ADLs. Problems:    [x] ? Pain:  [] ? ROM:  [x] ? Strength:  [x] ? Function:  [x] Other: decreased balance, decreased edurance. STG: (to be met in 10 treatments)  Pt will be assessed on BROWN balance scale with appropriate goal to be set once determined. Pt will be able to complete sit to stand from a standard chair without UE support to increase independence to transfer from any surface.    Pt will be able to to complete 6 minutes of continuous ambulation with LRAD at mod IND level to progress endurance needed for community ambulation   Pt will demonstrate >10 SLS stability with light UE support to safely allow pt to enter/exit her tub shower. LTG: (to be met in 20 treatments)  Pt will improve score on BROWN balance scale by >/= 45/56 points  to improve overall balance stability and decrease fall risk with mobility. Pt will improve time on 5xSTS to <15 seconds without use of UEs to decrease fall risk and increase functional capacity for mobility. Pt will be independent with home program addressing strength, flexibility and balance to maximize gains made in therapy to improve overall functional capacity for mobility. Pt will report no falls for x6 weeks demonstrating improved safety with mobility and implementation of fall prevention strategies. Pt will increase score on SIS-16 to >/= 60/80 to demonstrate improved functional levels with ADLs. Pt will increase gait speed to >/=  1.0 m/s with LRAD at mod I level to improve ability to navigate as a community ambulator. Pt will be able to ambulate 20 ft without AD at IND level without LOB to improve mobility to no AD for in the home. **Further goals to be established based on additional assessments**         Rehab Potential:  [x] Good  [] Fair  [] Poor   Suggested Professional Referral:  [x] No  [] Yes:  Barriers to Goal Achievement[de-identified]  [] No  [x] Yes: dialysis - MWF   Domestic Concerns:  [x] No  [] Yes:    Pt. Education:  [x] Plans/Goals, Risks/Benefits discussed  [] Home exercise program  Method of Education: [x] Verbal  [x] Demo  [] Written  Comprehension of Education:  [x] Verbalizes understanding. [x] Demonstrates understanding. [] Needs Review. [] Demonstrates/verbalizes understanding of HEP/Ed previously given.     Treatment Plan:  [x] Therapeutic Exercise   66224  [] Iontophoresis: 4 mg/mL Dexamethasone Sodium Phosphate  mAmin  64936   [x] Therapeutic Activity  32478 [] Vasopneumatic cold with compression  69836    [x] Gait Training   97299 [] Ultrasound   22522   [x] Neuromuscular Re-education  84604 [] Electrical Stimulation Unattended  71508   [] Manual Therapy  33467 [] Electrical Stimulation Attended  [id number]   [x] Instruction in HEP  [] Dry Needling   [] Aquatic Therapy   20436 [] Cold/hotpack    [] Massage   18671      [] Lumbar/Cervical Traction  [id number]     []  Medication allergies reviewed for use of    Dexamethasone Sodium Phosphate 4mg/ml     with iontophoresis treatments. Pt is not allergic.       Frequency: 2 x/weeks for 20 visits    Todays Treatment:  No treatment this date due to extensive hx review     Specific Instructions for next treatment: HEP, BROWN, 6mWT -- begin balance work based on [employer], functional strengthening, nustep     Evaluation Complexity:  History (Personal factors, comorbidities) [] 0 [] 1-2 [x] 3+   Exam (limitations, restrictions) [] 1-2 [] 3 [x] 4+   Clinical presentation (progression) [] Stable [] Evolving  [x] Unstable   Decision Making [] Low [] Moderate [x] High    [] Low Complexity [] Moderate Complexity [x] High Complexity       Treatment Charges: Mins Units   [x] Evaluation       []  Low       []  Moderate       [x]  High 59 1   []  Modalities     []  Ther Exercise     []  Manual Therapy     []  Ther Activities     []  Aquatics     []  Vasocompression     []  Other       TOTAL TREATMENT TIME: 59     (Billed timed treatment: 0)     Time In: 1401    Time Out: 1500     Electronically signed by:   Allison Rodriguez PT, DPT   #[id number]

## 2021-12-16 NOTE — PROGRESS NOTES
Occupational [facility]  Rehabilitation Services   Occupational Therapy Evaluation  Date: 21  Patient Name: Francisco Fraris      MRN: [id number]  Account: [de-identified]   : 1960  (59 y.o.)  Gender: female   Referring Practitioner: Jorgito SHELBY- CNP  Diagnosis: long term memory loss (R41.3), ischemic stroke of routine frontal lobe (I63.9) , muscle rt arm weakness (M62.81)  Additional Pertinent Hx: Fistula lt forearm 2021. OT Visit Information  Onset Date: 21  OT Insurance Information: MMO ( OT visits combined with PT/ST) 22 therapy visits remaining. Total # of Visits Approved: 16  Total # of Visits to Date: 1  Past Medical History:  has a past medical history of Backache, unspecified, CHF (congestive heart failure) ([address] Chronic kidney disease, Coronary atherosclerosis of artery bypass graft, Cramp of limb, Gallstones, Hypertension, Insomnia, Pneumonia, Type II or unspecified type diabetes mellitus with renal manifestations, not stated as uncontrolled(250.40), Type II or unspecified type diabetes mellitus without mention of complication, not stated as uncontrolled, and Unspecified vitamin D deficiency. Past Surgical History:   has a past surgical history that includes Coronary artery bypass graft; Knee arthroscopy; Carpal tunnel release; Breast surgery; Tonsillectomy; Hand surgery; Ankle fracture surgery; Cholecystectomy, open (N/A); IR TUNNELED CVC PLACE WO SQ PORT/PUMP > 5 YEARS (2021); AV fistula creation (2021); and Dialysis fistula creation (Left, 2021). Problem List: has Atherosclerosis of coronary artery bypass graft of native heart without angina pectoris; Acute renal failure ([address] Diabetes mellitus due to underlying condition with diabetic nephropathy, with long-term current use of insulin ([address] Chronic diastolic heart failure ([address] Diabetic polyneuropathy associated with type 2 diabetes mellitus ([address]  History of coronary artery bypass graft; Iron deficiency anemia; Spinal stenosis of lumbar region with neurogenic claudication; Mixed hyperlipidemia; Stage 4 chronic kidney disease ([facility] Type 2 diabetes mellitus with kidney complication, with long-term current use of insulin ([facility] Syncope and collapse; Obesity, Class II, BMI 35-39.9; Thyroid nodule greater than or equal to 1 cm in diameter incidentally noted on imaging study; Essential hypertension; Anemia in stage 4 chronic kidney disease ([facility] Chronic ischemic heart disease; Ischemic stroke of frontal lobe ([facility] Stroke-like symptoms; Morbid obesity with BMI of 40.0-44.9, adult ([facility] Acute encephalopathy; Disequilibrium syndrome; Debility; Long-term memory loss; Muscle right arm weakness; Anxiety; Chronic midline low back pain with bilateral sciatica; Need for immunization against influenza; and Altered mental status on their problem list.  Pain Assessment  Patient Currently in Pain: Yes  Pain Assessment: 0-10  Pain Level: 5  Pain Type: Acute pain  Pain Location: Hand (rt leg and both feet)  Pain Orientation: Right  Pain Descriptors: Numbness (, neuropathy feet. , numbness hand)  Patient's Stated Pain Goal: No pain    Subjective  Subjective: Pt went from [facility] to [facility] for rehab, then again to [facility] for rehab. Pt just recently finished home therapy. Pt states 8-1-21 stroke, then 2nd episode in september she lost all memory and couldn't feel rt arm/leg. Pt states since her stroke her rt hand(fingers) are swollen & tight making it difficult to write. . Pt will see specialist for her memory on Jan. 22, 2022. Pt states walking makes leg/feet pain worse and sitting makes it better. Comments: Pt seen for OT eval.  Vision  Vision: Impaired  Vision Exceptions: Wears glasses for reading  Hearing  Hearing: Within functional limits  Social/Functional History  Lives With:  (Pt's mother.  Pt takes care of her mother.)  Type of Home:  (condo)  Home Layout: One level  Home but her sister does mostly)   Opening a jar 3  (difficult with tigtht jar)   Carrying a small suitcase with your affected limb 2  (Pt carries items on 4 wheeled walker)   UEFS Score 36.25   UEFS Disability Index 40-59%   UEFS CMS Modifier CK%     Objective    Sensation  Overall Sensation Status: Impaired  Light Touch: Partial deficits in the RUE (impaired rt hand, partial in rt forearm)  Proprioception: Partial deficits in the RUE   ADL  Additional Comments: Pt reports independence with ADLs (eating,grooming, bathing, dressing ) using rt UE.  UE Function  Hand Dominance  Hand Dominance: Right   LUE Strength  LUE Strength Comment: Not tested d/t recent fistula   LUE AROM (degrees)  LUE General AROM: Pt has limitations lt shoulder AROM (pt got fistula lt arm this week). Pt states before the fistula she could raise lt arm in all directions without problems. Left Hand AROM (degrees)  Left Hand AROM: WFL  RUE Strength  R Shoulder Flex: 4-/5  R Shoulder ABduction: 4-/5  R Shoulder Int Rotation: 4/5  R Shoulder Ext Rotation: 4/5  R Elbow Flex: 5/5  R Elbow Ext: 4-/5  R Forearm Pron: 4/5  R Forearm Sup: 4-/5  R Wrist Flex: 4-/5  R Wrist Ext: 4/5  R Hand General:  (extension 3+/5, flexion 4-/5)  RUE Strength Comment: Pain rt shoulder with MMT. RUE Tone: Normotonic  RUE PROM (degrees)  RUE PROM: WFL  RUE AROM (degrees)  RUE AROM : WFL  Right Hand AROM (degrees)  Right Hand AROM: WFL  R Thumb Opposition: slow opposition with difficulty touching little finger.   Coordination  Movements Are Fluid And Coordinated: No  Coordination and Movement description: Fine motor impairments, Decreased speed, Decreased accuracy, Right UE   Left Hand Strength -  (lbs)  Handle Setting 2: 30,30   Right Hand Strength -  (lbs)  Handle Setting 2: 30,30 - below the 10th percentile for norms  Right Hand Strength - Pinch (lbs)  Lateral: 6,7 average 6.5#  - below the 10th percentile for norms  Tip: 3,3  - below the 10th percentile for norms  Palmar 3 point: 6,6 -slightly below the 10th percentile for norms  RUE Edema - Circumference (cm)  RUE Edema Present?: Yes (swelling distal palm and fingers rt hand.)  Fine Motor Skills  [place] Rate of Manipulation: MROM placing test : rt UE Pt completes 9 rows (4 disc each) & 1 row of 3 out of 15 rows in 1 minute 4 seconds  Left 9 Hole Peg Test Time (secs): 27  Right 9-Hole Peg Test: Impaired (Pt states she can't feel the pegs. Pt at 25th percentile for norms)  Right 9 Hole Peg Test Time (secs): 24   Other exercises  Other exercises?: Yes  Other exercises 1: PROM rt UE and hand. rt hand finger blocking exercises 10x each (PIPs, DIPs)  Other exercises 2: rt hand based skateboard on table 10x each way: up/down, side to side, circles cw & ccw  Other exercises 3: yellow 1.5# digiflex: rt hand gripping 20x, each finger rt hand oppositional pinch 15x  Assessment  Performance deficits / Impairments: Decreased high-level IADLs, Decreased strength, Decreased fine motor control, Decreased sensation  Assessment: Pt has weakness  rt UE/hand and impaired coordination. Pt will benefit from skilled OT to facilitate improved rt UE coordination & strength, & increase independence using rt UE for IADLs. Treatment Diagnosis: rt UE weakness, impaired coordination, impaired sensation rt hand, swelling rt hand (fingers) (M62.81,R27.9,R20.2  Prognosis: Good  Decision Making: Medium Complexity  History: Dialysis 3days /week . Pt got fistula 12-  Exam: 4 performance deficits. Assistance / Modification: See UEFS for details. REQUIRES OT FOLLOW UP: Yes  Treatment Initiated : See OT exericses for detail. Discharge Recommendations: Outpatient OT  Patient Education:  Patient Education: Pt familiar with OT from being in the hospital and home therapy. Pt reports she is doing HEP for rt arm & she demo for OT. OT reviewed POC.   Learner:patient  Method: explanation       Outcome: verbalized understanding   Goals  Patient Goals Patient goals : To get stronger, walk, drive  Short term goals  Time Frame for Short term goals: 8 visits  Short term goal 1: Pt will demo independence with upgraded HEP for rt UE. Short term goal 2: Pt will demo improving rt hand fine motor skills (quicker opposition to all fingers, complete 9 hole peg test faster than eval). Short term goal 3: Pt will demo improved rt hand manual dexterity by completing MROM placing test.  Short term goal 4: Increase rt hand strength (  by 5#, pinch (lateral ,tip, 3jaw) by 2# for holding , gripping, & stabilizing objects during daily activities  Long term goals  Time Frame for Long term goals : 16 visits  Long term goal 1: Using UEFS when using rt UE pt will report score 2 (moderate difficulty) with lifting light groceries, score 3 (little difficulty) with carrying & assisting with laundry, score 4 (no difficulty) pushing up from chair, opening jar. Long term goal 2: Increase rt UE/hand strength to 4/5 (shoulder (flexion,abduction), elbow extension, forearm supination, wrist flexion, hand (flexion,extension) so that pt can do light lift/carry with household activities. Long term goal 3: Compared to eval increase rt hand strength( by 10#, pinch (lateral,tip, 3jaw ) by 4#. Plan  REQUIRES OT FOLLOW UP: Yes  Plan  Times per week: 2x/week  Plan weeks: 16 visits  Current Treatment Recommendations: ROM, Strengthening, Patient/Caregiver Education & Training, Neuromuscular Re-education (coordination, edema management rt hand)  Plan Comment: continue OT  OT Individual Minutes  Time In: 1300  Time Out: 8094  Minutes: 59  Time Code Minutes   Timed Code Treatment Minutes: 15 Minutes  Rehab Potential:  [x] Good  [] Fair  [] Poor   Suggested Professional Referral:  [] No  [] Yes:Pt got PT eval today.   Barriers to Goal Achievement:  [] No  [] Yes:dialysis 3 day/week Domestic Concerns:  [x] No  [] Yes:  Treatment Plan:  [x] Therapeutic Exercise      [x] Neuromuscular Re-education [x] Instruction in HEP       Frequency:       2    X/wk x          16 visits  [x] Plans/Goals, Risk/Benefits discussed with pt  Comprehension of Education [x] D/V Understanding  [] Needs Review  Pt Education: [x] Verbal  [] Demo  [] Written    Regulatory Requirements:   I have reviewed this plan of care and certify a need for Medically necessary rehabilitation services.         [x] Physician Signature                                      Date:   [address] [facility]  [address] [facility], [address] [place] [address] [address]   [address] [facility], [address]  Phone: [phone]  Fax: [phone]  Electronically signed by Sandi Flores OT on 12/16/21 at 6:44 PM EST    Treatment Charges:  Minutes Units Time In-Out   Ultrasound      Electrical Stim      Iontophoresis      Paraffin       Massage      Eval 44 1    ADL       Ther Exercise 15 1    Ther Activities      Neuro Re-Ed      Splinting       Other      Total Time:  61

## 2021-12-23 ENCOUNTER — HOSPITAL ENCOUNTER (OUTPATIENT)
Age: 61
Setting detail: OBSERVATION
Discharge: NURSING, REHAB OR LONG-TERM CARE | End: 2022-01-13
Attending: EMERGENCY MEDICINE | Admitting: FAMILY MEDICINE
Payer: COMMERCIAL

## 2021-12-23 ENCOUNTER — APPOINTMENT (OUTPATIENT)
Dept: GENERAL RADIOLOGY | Age: 61
End: 2021-12-23
Payer: COMMERCIAL

## 2021-12-23 ENCOUNTER — HOSPITAL ENCOUNTER (OUTPATIENT)
Dept: PHYSICAL THERAPY | Age: 61
Setting detail: THERAPIES SERIES
Discharge: HOME | End: 2021-12-23
Payer: COMMERCIAL

## 2021-12-23 DIAGNOSIS — M54.6 ACUTE RIGHT-SIDED THORACIC BACK PAIN: Primary | ICD-10-CM

## 2021-12-23 PROCEDURE — 97110 THERAPEUTIC EXERCISES: CPT

## 2021-12-23 PROCEDURE — 6360000002 HC RX W HCPCS: Performed by: STUDENT IN AN ORGANIZED HEALTH CARE EDUCATION/TRAINING PROGRAM

## 2021-12-23 PROCEDURE — 97112 NEUROMUSCULAR REEDUCATION: CPT

## 2021-12-23 PROCEDURE — 71045 X-RAY EXAM CHEST 1 VIEW: CPT

## 2021-12-23 PROCEDURE — 99285 EMERGENCY DEPT VISIT HI MDM: CPT

## 2021-12-23 PROCEDURE — 96372 THER/PROPH/DIAG INJ SC/IM: CPT

## 2021-12-23 RX ORDER — LORAZEPAM 2 MG/ML
2 INJECTION INTRAMUSCULAR ONCE
Status: DISCONTINUED | OUTPATIENT
Start: 2021-12-23 | End: 2021-12-23

## 2021-12-23 RX ORDER — DIPHENHYDRAMINE HYDROCHLORIDE 50 MG/ML
50 INJECTION INTRAMUSCULAR; INTRAVENOUS ONCE
Status: COMPLETED | OUTPATIENT
Start: 2021-12-23 | End: 2021-12-23

## 2021-12-23 RX ORDER — ORPHENADRINE CITRATE 30 MG/ML
60 INJECTION INTRAMUSCULAR; INTRAVENOUS ONCE
Status: COMPLETED | OUTPATIENT
Start: 2021-12-23 | End: 2021-12-23

## 2021-12-23 RX ORDER — LORAZEPAM 2 MG/ML
2 INJECTION INTRAMUSCULAR ONCE
Status: COMPLETED | OUTPATIENT
Start: 2021-12-23 | End: 2021-12-23

## 2021-12-23 RX ORDER — HALOPERIDOL 5 MG/ML
5 INJECTION INTRAMUSCULAR ONCE
Status: COMPLETED | OUTPATIENT
Start: 2021-12-23 | End: 2021-12-23

## 2021-12-23 RX ADMIN — DIPHENHYDRAMINE HYDROCHLORIDE 50 MG: 50 INJECTION, SOLUTION INTRAMUSCULAR; INTRAVENOUS at 23:07

## 2021-12-23 RX ADMIN — HALOPERIDOL LACTATE 5 MG: 5 INJECTION, SOLUTION INTRAMUSCULAR at 23:07

## 2021-12-23 RX ADMIN — LORAZEPAM 2 MG: 2 INJECTION INTRAMUSCULAR; INTRAVENOUS at 22:43

## 2021-12-23 RX ADMIN — ORPHENADRINE CITRATE 60 MG: 60 INJECTION INTRAMUSCULAR; INTRAVENOUS at 22:43

## 2021-12-23 NOTE — FLOWSHEET NOTE
her tub shower. LTG: (to be met in 20 treatments)  1. Pt will improve score on BROWN balance scale by >/= 45/56 points  to improve overall balance stability and decrease fall risk with mobility. 2.  Pt will improve time on 5xSTS to <15 seconds without use of UEs to decrease fall risk and increase functional capacity for mobility. 3. Pt will be independent with home program addressing strength, flexibility and balance to maximize gains made in therapy to improve overall functional capacity for mobility. 4. Pt will report no falls for x6 weeks demonstrating improved safety with mobility and implementation of fall prevention strategies. 5. Pt will increase score on SIS-16 to >/= 60/80 to demonstrate improved functional levels with ADLs. 6. Pt will increase gait speed to >/=  1.0 m/s with LRAD at mod I level to improve ability to navigate as a community ambulator. 7. Pt will be able to ambulate 20 ft without AD at IND level without LOB to improve mobility to no AD for in the home. **Further goals to be established based on additional assessments**     Pt. Education:  [x] Yes  [] No  [] Reviewed Prior HEP/Ed  Method of Education: [x] Verbal  [] Demo  [] Written  Comprehension of Education:  [x] Verbalizes understanding. [] Demonstrates understanding. [] Needs review. [] Demonstrates/verbalizes HEP/Ed previously given. Plan: [x] Continue per plan of care.    [] Other:      Treatment Charges: Mins Units   []  Modalities     [x]  Ther Exercise 32 2   []  Manual Therapy     []  Ther Activities     []  Aquatics     [x]  Neuromuscular 15 1   [] Vasocompression     [] Gait Training     [] Dry needling        [] 1 or 2 muscles        [] 3 or more muscles     []  Other     Total Treatment time 47 3     Time In: 3:32p         Time Out: 4:19p    Electronically signed by:  Konrad Vogel, PT

## 2021-12-24 ENCOUNTER — APPOINTMENT (OUTPATIENT)
Dept: GENERAL RADIOLOGY | Age: 61
End: 2021-12-24
Payer: COMMERCIAL

## 2021-12-24 ENCOUNTER — APPOINTMENT (OUTPATIENT)
Dept: CT IMAGING | Age: 61
End: 2021-12-24
Payer: COMMERCIAL

## 2021-12-24 PROBLEM — M54.6 ACUTE RIGHT-SIDED THORACIC BACK PAIN: Status: ACTIVE | Noted: 2021-12-24

## 2021-12-24 LAB
ABSOLUTE EOS #: 0.2 K/UL (ref 0–0.4)
ABSOLUTE IMMATURE GRANULOCYTE: ABNORMAL K/UL (ref 0–0.3)
ABSOLUTE LYMPH #: 1.8 K/UL (ref 1–4.8)
ABSOLUTE MONO #: 0.5 K/UL (ref 0.1–1.3)
ANION GAP SERPL CALCULATED.3IONS-SCNC: 13 MMOL/L (ref 9–17)
ANION GAP SERPL CALCULATED.3IONS-SCNC: 14 MMOL/L (ref 9–17)
BASOPHILS # BLD: 1 % (ref 0–2)
BASOPHILS ABSOLUTE: 0.1 K/UL (ref 0–0.2)
BUN BLDV-MCNC: 34 MG/DL (ref 8–23)
BUN BLDV-MCNC: 34 MG/DL (ref 8–23)
BUN/CREAT BLD: ABNORMAL (ref 9–20)
BUN/CREAT BLD: ABNORMAL (ref 9–20)
CALCIUM SERPL-MCNC: 9.1 MG/DL (ref 8.6–10.4)
CALCIUM SERPL-MCNC: 9.5 MG/DL (ref 8.6–10.4)
CHLORIDE BLD-SCNC: 107 MMOL/L (ref 98–107)
CHLORIDE BLD-SCNC: 109 MMOL/L (ref 98–107)
CO2: 21 MMOL/L (ref 20–31)
CO2: 22 MMOL/L (ref 20–31)
CREAT SERPL-MCNC: 2.89 MG/DL (ref 0.5–0.9)
CREAT SERPL-MCNC: 2.91 MG/DL (ref 0.5–0.9)
DIFFERENTIAL TYPE: ABNORMAL
EOSINOPHILS RELATIVE PERCENT: 3 % (ref 0–4)
ESTIMATED AVERAGE GLUCOSE: 171 MG/DL
GFR AFRICAN AMERICAN: 20 ML/MIN
GFR AFRICAN AMERICAN: 20 ML/MIN
GFR NON-AFRICAN AMERICAN: 16 ML/MIN
GFR NON-AFRICAN AMERICAN: 16 ML/MIN
GFR SERPL CREATININE-BSD FRML MDRD: ABNORMAL ML/MIN/{1.73_M2}
GLUCOSE BLD-MCNC: 100 MG/DL (ref 65–105)
GLUCOSE BLD-MCNC: 104 MG/DL (ref 70–99)
GLUCOSE BLD-MCNC: 105 MG/DL (ref 70–99)
GLUCOSE BLD-MCNC: 153 MG/DL (ref 65–105)
GLUCOSE BLD-MCNC: 281 MG/DL (ref 65–105)
HBA1C MFR BLD: 7.6 % (ref 4–6)
HCT VFR BLD CALC: 30.8 % (ref 36–46)
HEMOGLOBIN: 10.4 G/DL (ref 12–16)
IMMATURE GRANULOCYTES: ABNORMAL %
LYMPHOCYTES # BLD: 27 % (ref 24–44)
MCH RBC QN AUTO: 32.7 PG (ref 26–34)
MCHC RBC AUTO-ENTMCNC: 33.8 G/DL (ref 31–37)
MCV RBC AUTO: 96.9 FL (ref 80–100)
MONOCYTES # BLD: 7 % (ref 1–7)
NRBC AUTOMATED: ABNORMAL PER 100 WBC
PDW BLD-RTO: 14.5 % (ref 11.5–14.9)
PLATELET # BLD: 182 K/UL (ref 150–450)
PLATELET ESTIMATE: ABNORMAL
PMV BLD AUTO: 8.2 FL (ref 6–12)
POTASSIUM SERPL-SCNC: 3.7 MMOL/L (ref 3.7–5.3)
POTASSIUM SERPL-SCNC: 4.2 MMOL/L (ref 3.7–5.3)
RBC # BLD: 3.18 M/UL (ref 4–5.2)
RBC # BLD: ABNORMAL 10*6/UL
SEG NEUTROPHILS: 62 % (ref 36–66)
SEGMENTED NEUTROPHILS ABSOLUTE COUNT: 4.2 K/UL (ref 1.3–9.1)
SODIUM BLD-SCNC: 142 MMOL/L (ref 135–144)
SODIUM BLD-SCNC: 144 MMOL/L (ref 135–144)
WBC # BLD: 6.8 K/UL (ref 3.5–11)
WBC # BLD: ABNORMAL 10*3/UL

## 2021-12-24 PROCEDURE — 6370000000 HC RX 637 (ALT 250 FOR IP): Performed by: FAMILY MEDICINE

## 2021-12-24 PROCEDURE — 2580000003 HC RX 258: Performed by: FAMILY MEDICINE

## 2021-12-24 PROCEDURE — 85025 COMPLETE CBC W/AUTO DIFF WBC: CPT

## 2021-12-24 PROCEDURE — 96374 THER/PROPH/DIAG INJ IV PUSH: CPT

## 2021-12-24 PROCEDURE — 90935 HEMODIALYSIS ONE EVALUATION: CPT

## 2021-12-24 PROCEDURE — G0378 HOSPITAL OBSERVATION PER HR: HCPCS

## 2021-12-24 PROCEDURE — 83036 HEMOGLOBIN GLYCOSYLATED A1C: CPT

## 2021-12-24 PROCEDURE — 6370000000 HC RX 637 (ALT 250 FOR IP): Performed by: STUDENT IN AN ORGANIZED HEALTH CARE EDUCATION/TRAINING PROGRAM

## 2021-12-24 PROCEDURE — 82947 ASSAY GLUCOSE BLOOD QUANT: CPT

## 2021-12-24 PROCEDURE — 6360000002 HC RX W HCPCS: Performed by: INTERNAL MEDICINE

## 2021-12-24 PROCEDURE — 6360000002 HC RX W HCPCS: Performed by: STUDENT IN AN ORGANIZED HEALTH CARE EDUCATION/TRAINING PROGRAM

## 2021-12-24 PROCEDURE — 97161 PT EVAL LOW COMPLEX 20 MIN: CPT

## 2021-12-24 PROCEDURE — 80048 BASIC METABOLIC PNL TOTAL CA: CPT

## 2021-12-24 PROCEDURE — 2500000003 HC RX 250 WO HCPCS: Performed by: INTERNAL MEDICINE

## 2021-12-24 PROCEDURE — 6360000002 HC RX W HCPCS: Performed by: FAMILY MEDICINE

## 2021-12-24 PROCEDURE — 36415 COLL VENOUS BLD VENIPUNCTURE: CPT

## 2021-12-24 PROCEDURE — 96376 TX/PRO/DX INJ SAME DRUG ADON: CPT

## 2021-12-24 PROCEDURE — 73630 X-RAY EXAM OF FOOT: CPT

## 2021-12-24 PROCEDURE — 99220 PR INITIAL OBSERVATION CARE/DAY 70 MINUTES: CPT | Performed by: FAMILY MEDICINE

## 2021-12-24 PROCEDURE — 70450 CT HEAD/BRAIN W/O DYE: CPT

## 2021-12-24 RX ORDER — HYDROCODONE BITARTRATE AND ACETAMINOPHEN 5; 325 MG/1; MG/1
1 TABLET ORAL EVERY 6 HOURS PRN
Qty: 20 TABLET | Refills: 0 | Status: SHIPPED | OUTPATIENT
Start: 2021-12-24 | End: 2021-12-28 | Stop reason: HOSPADM

## 2021-12-24 RX ORDER — DOCUSATE SODIUM 100 MG/1
100 CAPSULE, LIQUID FILLED ORAL 2 TIMES DAILY
Status: DISCONTINUED | OUTPATIENT
Start: 2021-12-24 | End: 2022-01-13 | Stop reason: HOSPADM

## 2021-12-24 RX ORDER — SODIUM CHLORIDE 0.9 % (FLUSH) 0.9 %
5-40 SYRINGE (ML) INJECTION PRN
Status: DISCONTINUED | OUTPATIENT
Start: 2021-12-24 | End: 2022-01-13 | Stop reason: HOSPADM

## 2021-12-24 RX ORDER — LIDOCAINE 4 G/G
1 PATCH TOPICAL DAILY
Status: DISCONTINUED | OUTPATIENT
Start: 2021-12-24 | End: 2022-01-13 | Stop reason: HOSPADM

## 2021-12-24 RX ORDER — ASPIRIN 81 MG/1
81 TABLET, CHEWABLE ORAL DAILY
Status: DISCONTINUED | OUTPATIENT
Start: 2021-12-24 | End: 2022-01-13 | Stop reason: HOSPADM

## 2021-12-24 RX ORDER — NICOTINE POLACRILEX 4 MG
15 LOZENGE BUCCAL PRN
Status: DISCONTINUED | OUTPATIENT
Start: 2021-12-24 | End: 2022-01-13 | Stop reason: HOSPADM

## 2021-12-24 RX ORDER — SODIUM CITRATE 4 % (5 ML)
1.9 SYRINGE (ML) MISCELLANEOUS PRN
Status: DISCONTINUED | OUTPATIENT
Start: 2021-12-24 | End: 2021-12-31 | Stop reason: RX

## 2021-12-24 RX ORDER — MORPHINE SULFATE 4 MG/ML
4 INJECTION, SOLUTION INTRAMUSCULAR; INTRAVENOUS
Status: DISCONTINUED | OUTPATIENT
Start: 2021-12-24 | End: 2021-12-24

## 2021-12-24 RX ORDER — CARVEDILOL 6.25 MG/1
6.25 TABLET ORAL 2 TIMES DAILY
Status: DISCONTINUED | OUTPATIENT
Start: 2021-12-24 | End: 2021-12-31

## 2021-12-24 RX ORDER — MORPHINE SULFATE 4 MG/ML
4 INJECTION, SOLUTION INTRAMUSCULAR; INTRAVENOUS ONCE
Status: COMPLETED | OUTPATIENT
Start: 2021-12-24 | End: 2021-12-24

## 2021-12-24 RX ORDER — ACETAMINOPHEN 325 MG/1
650 TABLET ORAL EVERY 6 HOURS PRN
Status: DISCONTINUED | OUTPATIENT
Start: 2021-12-24 | End: 2022-01-13 | Stop reason: HOSPADM

## 2021-12-24 RX ORDER — SODIUM CHLORIDE 0.9 % (FLUSH) 0.9 %
5-40 SYRINGE (ML) INJECTION EVERY 12 HOURS SCHEDULED
Status: DISCONTINUED | OUTPATIENT
Start: 2021-12-24 | End: 2022-01-13 | Stop reason: HOSPADM

## 2021-12-24 RX ORDER — RANOLAZINE 500 MG/1
1000 TABLET, EXTENDED RELEASE ORAL 2 TIMES DAILY
Status: DISCONTINUED | OUTPATIENT
Start: 2021-12-24 | End: 2022-01-13 | Stop reason: HOSPADM

## 2021-12-24 RX ORDER — DEXTROSE MONOHYDRATE 50 MG/ML
100 INJECTION, SOLUTION INTRAVENOUS PRN
Status: DISCONTINUED | OUTPATIENT
Start: 2021-12-24 | End: 2022-01-13 | Stop reason: HOSPADM

## 2021-12-24 RX ORDER — ACETAMINOPHEN 650 MG/1
650 SUPPOSITORY RECTAL EVERY 6 HOURS PRN
Status: DISCONTINUED | OUTPATIENT
Start: 2021-12-24 | End: 2022-01-13 | Stop reason: HOSPADM

## 2021-12-24 RX ORDER — TRAZODONE HYDROCHLORIDE 50 MG/1
75 TABLET ORAL NIGHTLY
Status: DISCONTINUED | OUTPATIENT
Start: 2021-12-24 | End: 2022-01-13 | Stop reason: HOSPADM

## 2021-12-24 RX ORDER — POLYETHYLENE GLYCOL 3350 17 G/17G
17 POWDER, FOR SOLUTION ORAL DAILY PRN
Status: DISCONTINUED | OUTPATIENT
Start: 2021-12-24 | End: 2022-01-13 | Stop reason: HOSPADM

## 2021-12-24 RX ORDER — INSULIN GLARGINE 100 [IU]/ML
73 INJECTION, SOLUTION SUBCUTANEOUS 2 TIMES DAILY
Status: DISCONTINUED | OUTPATIENT
Start: 2021-12-24 | End: 2022-01-13 | Stop reason: HOSPADM

## 2021-12-24 RX ORDER — ONDANSETRON 2 MG/ML
4 INJECTION INTRAMUSCULAR; INTRAVENOUS EVERY 6 HOURS PRN
Status: DISCONTINUED | OUTPATIENT
Start: 2021-12-24 | End: 2022-01-13 | Stop reason: HOSPADM

## 2021-12-24 RX ORDER — SENNA PLUS 8.6 MG/1
2 TABLET ORAL DAILY PRN
Status: DISCONTINUED | OUTPATIENT
Start: 2021-12-24 | End: 2022-01-13 | Stop reason: HOSPADM

## 2021-12-24 RX ORDER — GABAPENTIN 300 MG/1
300 CAPSULE ORAL 2 TIMES DAILY
Status: DISCONTINUED | OUTPATIENT
Start: 2021-12-24 | End: 2022-01-05

## 2021-12-24 RX ORDER — ATORVASTATIN CALCIUM 80 MG/1
80 TABLET, FILM COATED ORAL DAILY
Status: DISCONTINUED | OUTPATIENT
Start: 2021-12-24 | End: 2022-01-13 | Stop reason: HOSPADM

## 2021-12-24 RX ORDER — ONDANSETRON 4 MG/1
4 TABLET, ORALLY DISINTEGRATING ORAL EVERY 8 HOURS PRN
Status: DISCONTINUED | OUTPATIENT
Start: 2021-12-24 | End: 2022-01-13 | Stop reason: HOSPADM

## 2021-12-24 RX ORDER — TIZANIDINE 4 MG/1
4 TABLET ORAL EVERY 6 HOURS PRN
Qty: 20 TABLET | Refills: 0 | Status: SHIPPED | OUTPATIENT
Start: 2021-12-24 | End: 2021-12-28

## 2021-12-24 RX ORDER — SODIUM CHLORIDE 9 MG/ML
25 INJECTION, SOLUTION INTRAVENOUS PRN
Status: DISCONTINUED | OUTPATIENT
Start: 2021-12-24 | End: 2022-01-13 | Stop reason: HOSPADM

## 2021-12-24 RX ORDER — BUSPIRONE HYDROCHLORIDE 5 MG/1
7.5 TABLET ORAL 3 TIMES DAILY
Status: DISCONTINUED | OUTPATIENT
Start: 2021-12-24 | End: 2022-01-13 | Stop reason: HOSPADM

## 2021-12-24 RX ORDER — TIZANIDINE 4 MG/1
4 TABLET ORAL EVERY 6 HOURS PRN
Status: DISCONTINUED | OUTPATIENT
Start: 2021-12-24 | End: 2021-12-26

## 2021-12-24 RX ORDER — FUROSEMIDE 40 MG/1
80 TABLET ORAL 2 TIMES DAILY
Status: DISCONTINUED | OUTPATIENT
Start: 2021-12-24 | End: 2021-12-31

## 2021-12-24 RX ORDER — FEBUXOSTAT 40 MG/1
40 TABLET, FILM COATED ORAL DAILY
Status: DISCONTINUED | OUTPATIENT
Start: 2021-12-24 | End: 2022-01-13 | Stop reason: HOSPADM

## 2021-12-24 RX ORDER — TAMSULOSIN HYDROCHLORIDE 0.4 MG/1
0.4 CAPSULE ORAL DAILY
Status: DISCONTINUED | OUTPATIENT
Start: 2021-12-24 | End: 2022-01-13 | Stop reason: HOSPADM

## 2021-12-24 RX ORDER — HYDROCODONE BITARTRATE AND ACETAMINOPHEN 5; 325 MG/1; MG/1
1 TABLET ORAL EVERY 6 HOURS PRN
Status: DISCONTINUED | OUTPATIENT
Start: 2021-12-24 | End: 2022-01-13 | Stop reason: HOSPADM

## 2021-12-24 RX ORDER — TIZANIDINE 4 MG/1
8 TABLET ORAL EVERY 6 HOURS PRN
Status: DISCONTINUED | OUTPATIENT
Start: 2021-12-24 | End: 2021-12-24

## 2021-12-24 RX ORDER — PANTOPRAZOLE SODIUM 40 MG/1
40 TABLET, DELAYED RELEASE ORAL
Status: DISCONTINUED | OUTPATIENT
Start: 2021-12-24 | End: 2022-01-13 | Stop reason: HOSPADM

## 2021-12-24 RX ORDER — DEXTROSE MONOHYDRATE 25 G/50ML
12.5 INJECTION, SOLUTION INTRAVENOUS PRN
Status: DISCONTINUED | OUTPATIENT
Start: 2021-12-24 | End: 2022-01-13 | Stop reason: HOSPADM

## 2021-12-24 RX ORDER — POLYETHYLENE GLYCOL 3350 17 G/17G
17 POWDER, FOR SOLUTION ORAL DAILY
Status: DISCONTINUED | OUTPATIENT
Start: 2021-12-24 | End: 2022-01-13 | Stop reason: HOSPADM

## 2021-12-24 RX ORDER — HEPARIN SODIUM 5000 [USP'U]/ML
5000 INJECTION, SOLUTION INTRAVENOUS; SUBCUTANEOUS EVERY 8 HOURS SCHEDULED
Status: DISCONTINUED | OUTPATIENT
Start: 2021-12-24 | End: 2021-12-24 | Stop reason: SDUPTHER

## 2021-12-24 RX ADMIN — RANOLAZINE 1000 MG: 500 TABLET, FILM COATED, EXTENDED RELEASE ORAL at 16:18

## 2021-12-24 RX ADMIN — FUROSEMIDE 80 MG: 40 TABLET ORAL at 16:20

## 2021-12-24 RX ADMIN — HYDROCODONE BITARTRATE AND ACETAMINOPHEN 1 TABLET: 5; 325 TABLET ORAL at 12:44

## 2021-12-24 RX ADMIN — DOCUSATE SODIUM 100 MG: 100 CAPSULE ORAL at 20:51

## 2021-12-24 RX ADMIN — RANOLAZINE 1000 MG: 500 TABLET, FILM COATED, EXTENDED RELEASE ORAL at 20:52

## 2021-12-24 RX ADMIN — POLYETHYLENE GLYCOL 3350 17 G: 17 POWDER, FOR SOLUTION ORAL at 16:22

## 2021-12-24 RX ADMIN — SODIUM CHLORIDE, PRESERVATIVE FREE 10 ML: 5 INJECTION INTRAVENOUS at 16:24

## 2021-12-24 RX ADMIN — CARVEDILOL 6.25 MG: 6.25 TABLET, FILM COATED ORAL at 16:18

## 2021-12-24 RX ADMIN — INSULIN LISPRO 3 UNITS: 100 INJECTION, SOLUTION INTRAVENOUS; SUBCUTANEOUS at 17:23

## 2021-12-24 RX ADMIN — FEBUXOSTAT 40 MG: 40 TABLET, FILM COATED ORAL at 16:26

## 2021-12-24 RX ADMIN — GABAPENTIN 300 MG: 300 CAPSULE ORAL at 16:20

## 2021-12-24 RX ADMIN — TRAZODONE HYDROCHLORIDE 75 MG: 50 TABLET ORAL at 20:52

## 2021-12-24 RX ADMIN — GABAPENTIN 300 MG: 300 CAPSULE ORAL at 20:52

## 2021-12-24 RX ADMIN — ASPIRIN 81 MG: 81 TABLET, CHEWABLE ORAL at 16:19

## 2021-12-24 RX ADMIN — MORPHINE SULFATE 4 MG: 4 INJECTION INTRAVENOUS at 01:40

## 2021-12-24 RX ADMIN — INSULIN LISPRO 5 UNITS: 100 INJECTION, SOLUTION INTRAVENOUS; SUBCUTANEOUS at 21:14

## 2021-12-24 RX ADMIN — PANTOPRAZOLE SODIUM 40 MG: 40 TABLET, DELAYED RELEASE ORAL at 06:32

## 2021-12-24 RX ADMIN — APIXABAN 5 MG: 5 TABLET, FILM COATED ORAL at 16:19

## 2021-12-24 RX ADMIN — SODIUM CHLORIDE, PRESERVATIVE FREE 10 ML: 5 INJECTION INTRAVENOUS at 20:53

## 2021-12-24 RX ADMIN — ATORVASTATIN CALCIUM 80 MG: 80 TABLET, FILM COATED ORAL at 16:19

## 2021-12-24 RX ADMIN — DOCUSATE SODIUM 100 MG: 100 CAPSULE ORAL at 16:20

## 2021-12-24 RX ADMIN — INSULIN GLARGINE 73 UNITS: 100 INJECTION, SOLUTION SUBCUTANEOUS at 21:15

## 2021-12-24 RX ADMIN — APIXABAN 5 MG: 5 TABLET, FILM COATED ORAL at 20:52

## 2021-12-24 RX ADMIN — Medication 1.9 ML: at 15:51

## 2021-12-24 RX ADMIN — TAMSULOSIN HYDROCHLORIDE 0.4 MG: 0.4 CAPSULE ORAL at 16:20

## 2021-12-24 RX ADMIN — BUSPIRONE HYDROCHLORIDE 7.5 MG: 5 TABLET ORAL at 16:19

## 2021-12-24 RX ADMIN — BUSPIRONE HYDROCHLORIDE 7.5 MG: 5 TABLET ORAL at 20:51

## 2021-12-24 RX ADMIN — MORPHINE SULFATE 4 MG: 4 INJECTION, SOLUTION INTRAMUSCULAR; INTRAVENOUS at 05:50

## 2021-12-24 RX ADMIN — ALTEPLASE 2 MG: 2.2 INJECTION, POWDER, LYOPHILIZED, FOR SOLUTION INTRAVENOUS at 12:10

## 2021-12-24 ASSESSMENT — PAIN DESCRIPTION - ORIENTATION: ORIENTATION: LOWER;MID

## 2021-12-24 ASSESSMENT — PAIN SCALES - GENERAL
PAINLEVEL_OUTOF10: 0
PAINLEVEL_OUTOF10: 10
PAINLEVEL_OUTOF10: 8
PAINLEVEL_OUTOF10: 3
PAINLEVEL_OUTOF10: 10
PAINLEVEL_OUTOF10: 10

## 2021-12-24 ASSESSMENT — PAIN DESCRIPTION - LOCATION: LOCATION: BACK

## 2021-12-24 ASSESSMENT — PAIN DESCRIPTION - DESCRIPTORS: DESCRIPTORS: SPASM

## 2021-12-24 ASSESSMENT — PAIN DESCRIPTION - PAIN TYPE: TYPE: CHRONIC PAIN

## 2021-12-24 NOTE — ED NOTES
Admission Dx: Thoracic Back Pain    Pts Chief Complaints on Arrival: Back Pain    ADL's - Total assistance    Pending Diagnostics:  n/a    Residence PTA: single story home    Special Considerations/Circumstances:  n/a    Vitals: Current vital signs:  BP (!) 129/56   Pulse 74   Temp 98.5 °F (36.9 °C)   Resp 16   SpO2 96%                           Zion Monae RN  12/24/21 9918

## 2021-12-24 NOTE — CONSULTS
Department of Internal Medicine  Nephrology Daisy Walker MD   Consult Note    Reason for consultation: Management of hemodialysis dependent end-stage renal disease. Consulting physician: Jose A Swain MD    History of present illness: This is a 59 y.o. female with a significant past medical history of Chronic atrial fibrillation [on Eliquis], Type 2 diabetes mellitus, essential systemic hypertension, coronary artery disease [s/p CABG in 2004 and PTCA in 2019], heart failure with preserved ejection fraction [LVEF 55%] and end-stage renal disease secondary to diabetic and hypertensive nephropathy [on routine hemodialysis on a Monday/Wednesday/Friday schedule at [address] [place] [address] dialysis unit on [facility] under the care of Dr. Alethea Garner since August 2001 using IJ tunneled dialysis catheter], who presented to the hospital via EMS for evaluation of worsening low back pain rated 10/10. She was seen and examined this morning and she remains quite lethargic and stuporous and difficult to arouse. She however is not in acute respiratory distress.     Adhesive tape, Ace inhibitors, Iv dye [iodides], and Metformin and related    Past Medical History:   Diagnosis Date    Backache, unspecified     CHF (congestive heart failure) (HCC)     Chronic kidney disease     Coronary atherosclerosis of artery bypass graft     Cramp of limb     Gallstones     Hypertension     Insomnia     Pneumonia     Type II or unspecified type diabetes mellitus with renal manifestations, not stated as uncontrolled(250.40)     Type II or unspecified type diabetes mellitus without mention of complication, not stated as uncontrolled     Unspecified vitamin D deficiency      Scheduled Meds:   lidocaine  1 patch TransDERmal Daily    aspirin  81 mg Oral Daily    atorvastatin  80 mg Oral Daily    busPIRone  7.5 mg Oral TID    carvedilol  6.25 mg Oral BID    docusate sodium  100 mg Oral BID    apixaban  5 mg Oral BID    febuxostat 40 mg Oral Daily    furosemide  80 mg Oral BID    gabapentin  300 mg Oral BID    insulin glargine  73 Units SubCUTAneous BID    pantoprazole  40 mg Oral QAM AC    polyethylene glycol  17 g Oral Daily    ranolazine  1,000 mg Oral BID    tamsulosin  0.4 mg Oral Daily    traZODone  75 mg Oral Nightly    sodium chloride flush  5-40 mL IntraVENous 2 times per day    insulin lispro  0-18 Units SubCUTAneous TID WC    insulin lispro  0-9 Units SubCUTAneous Nightly     Continuous Infusions:   dextrose      sodium chloride       PRN Meds:.senna, glucose, dextrose, glucagon (rDNA), dextrose, sodium chloride flush, sodium chloride, ondansetron **OR** ondansetron, polyethylene glycol, acetaminophen **OR** acetaminophen, morphine, tiZANidine    Family History   Problem Relation Age of Onset    Diabetes Father     Heart Failure Father      Social History     Socioeconomic History    Marital status: Single     Spouse name: Not on file    Number of children: Not on file    Years of education: Not on file    Highest education level: Not on file   Occupational History    Not on file   Tobacco Use    Smoking status: Never Smoker    Smokeless tobacco: Never Used   Vaping Use    Vaping Use: Never used   Substance and Sexual Activity    Alcohol use: No    Drug use: No    Sexual activity: Not Currently   Other Topics Concern    Not on file   Social History Narrative    Not on file     Social Determinants of Health     Financial Resource Strain: Low Risk     Difficulty of Paying Living Expenses: Not hard at all   Food Insecurity:     Worried About Running Out of Food in the Last Year: Not on file    Nany of Food in the Last Year: Not on file   Transportation Needs:     Lack of Transportation (Medical): Not on file    Lack of Transportation (Non-Medical):  Not on file   Physical Activity:     Days of Exercise per Week: Not on file    Minutes of Exercise per Session: Not on file   Stress:     Feeling of Stress : Not on file   Social Connections:     Frequency of Communication with Friends and Family: Not on file    Frequency of Social Gatherings with Friends and Family: Not on file    Attends Worship Services: Not on file    Active Member of Clubs or Organizations: Not on file    Attends Club or Organization Meetings: Not on file    Marital Status: Not on file   Intimate Partner Violence:     Fear of Current or Ex-Partner: Not on file    Emotionally Abused: Not on file    Physically Abused: Not on file    Sexually Abused: Not on file   Housing Stability:     Unable to Pay for Housing in the Last Year: Not on file    Number of [place] in the Last Year: Not on file    Unstable Housing in the Last Year: Not on file     Review of systems:  Unobtainable due to lethargy. Physical Exam:    VITALS:  /72   Pulse 62   Temp 97.3 °F (36.3 °C)   Resp 16   SpO2 91%   24HR INTAKE/OUTPUT:  No intake or output data in the 24 hours ending 12/24/21 1113    Constitutional: slowed mentation and Lethargic and difficult to arouse    Skin: Skin color, texture, turgor normal. No rashes or lesions    Head: Normocephalic, without obvious abnormality, atraumatic     Cardiovascular/Edema: regular rate and rhythm, S1, S2 normal, no murmur, click, rub or gallop    Respiratory: Lungs: clear to auscultation bilaterally    Abdomen: soft, non-tender; bowel sounds normal; no masses,  no organomegaly    Back: symmetric, no curvature. ROM normal. No CVA tenderness. Extremities: edema +    Neuro:  Stuporous and lethargic.     CBC:   Recent Labs     12/24/21  0035   WBC 6.8   HGB 10.4*        BMP:    Recent Labs     12/24/21  0035 12/24/21  0608    144   K 3.7 4.2    109*   CO2 22 21   BUN 34* 34*   CREATININE 2.91* 2.89*   GLUCOSE 104* 105*       Lab Results   Component Value Date    NITRU NEGATIVE 11/14/2021    COLORU Yellow 11/14/2021    PHUR 5.0 11/14/2021    WBCUA 5 TO 10 11/14/2021    RBCUA 0 TO 2 11/14/2021    MUCUS NOT REPORTED 11/14/2021    TRICHOMONAS NOT REPORTED 11/14/2021    YEAST FEW 11/14/2021    BACTERIA MANY 11/14/2021    SPECGRAV 1.014 11/14/2021    LEUKOCYTESUR TRACE 11/14/2021    UROBILINOGEN Normal 11/14/2021    BILIRUBINUR NEGATIVE 11/14/2021    GLUCOSEU 3+ 11/14/2021    KETUA NEGATIVE 11/14/2021    AMORPHOUS NOT REPORTED 11/14/2021     Urine Sodium:     Lab Results   Component Value Date    JASON 47 11/14/2021     Urine Chloride:    Lab Results   Component Value Date    CLUR 26 11/14/2021     Urine Protein:     Lab Results   Component Value Date    TPU 20 11/12/2021     Urine Creatinine:     Lab Results   Component Value Date    LABCREA 72.0 11/14/2021     IMPRESSION/RECOMMENDATIONS:      1.  End-stage renal disease - we will maintain MWF hemodialysis schedule. Patient will be scheduled for acute hemodialysis today. Vascular access is IJ tunneled hemodialysis catheter. 2.  Altered mental status - rule out narcotic analgesia overdosage. blood glucose is within normal range. Sepsis will be ruled out also. 3.   Normocytic anemia of chronic kidney disease - hemoglobin 10.4 g/dL is within target range. 4.  Systemic hypertension - blood pressure control is adequate.     Thank you very much for the courtesy of this consultation    Sita Ruggiero MD FACP  Attending Nephrologist  12/24/2021 9:59 AM

## 2021-12-24 NOTE — CARE COORDINATION
CASE MANAGEMENT NOTE:    Admission Date:  12/23/2021 Soila Cintron is a 59 y.o.  female    Admitted for : Acute right-sided thoracic back pain [M54.6]    Met with:  Patient    PCP:  Janet Osgood                                Insurance:  [facility] [place]      Is patient alert and oriented at time of discussion:  Pt. Is sleepy, however, she did answer questions. Current Residence/ Living Arrangements:  independently at home,Lives in a Condo, Mother lives w/ her & she care for her. Current Services PTA:  Yes, Dialysis    Does patient go to outpatient dialysis: Yes  If yes, location and chair time: [facility] on [address], M/W/F at [address] [facility], Confirmed, by Cecelia Nelson, at [facility]. Is patient agreeable to VNS: Yes    Freedom of choice provided:  Yes    List of [address] provided: No    VNS chosen:  Yes, [facility], Just DC on 12/15, would like again. Referral made, writer spoke to [email or web address]    DME:  walker, wheelchair and shower chair,Glucometer    Home Oxygen: No    Nebulizer: No    CPAP/BIPAP: No    Supplier: N/A    Potential Assistance Needed: Yes, VNS    SNF needed: No    Freedom of choice and list provided: No, Has been to [facility] in November    Pharmacy:  [facility]       Does Patient want to use MEDS to BEDS? No    Is patient currently receiving oral anticoagulation therapy? Eliquis, listed on Med sheet, Pt. States, [facility] does not take Any more\"    Is the Patient an [employer] with Readmission Risk Score greater than 14%? No  If yes, pt needs a follow up appointment made within 7 days. Family Members/Caregivers that pt would like involved in their care:    Yes    If yes, list name here:  Tonny Marin    Transportation Provider:  Sisters             Discharge Plan:  12/24/21 [employer] [place] Pt. Lives in a [address] w/ Her Mom, who she helps care for. ANU- walker, WC. SC. Glucometer. Current w/ [facility] on [address], M/W/F at 1PM. Wants VNS, [facility], Referral sent.  Just DC from

## 2021-12-24 NOTE — ED NOTES
Pt presents with 10/10 back pain that she stated has been ongoing for 2 days. She states worsening with each day. Patient reports a history of back spasms and previously being hospitalized in 2020 for the issue.       Paul Michel RN  12/23/21 3138

## 2021-12-24 NOTE — H&P
Family Medicine Admit Note    PCP: AFSANEH Moseley CNP    Date of Admission: 12/23/2021    Date of Service: Pt seen/examined on 12/24/2021 and  Placed in Observation. Chief Complaint:  Right upper thoracic pain      History Of Present Illness: The patient is a 59 y.o. female who presents to [facility] with intractable right upper thoracic pain. Presently she is sleeping deeply, not awakening to exam.     Past Medical History:        Diagnosis Date    Backache, unspecified     CHF (congestive heart failure) (HCC)     Chronic kidney disease     Coronary atherosclerosis of artery bypass graft     Cramp of limb     Gallstones     Hypertension     Insomnia     Pneumonia     Type II or unspecified type diabetes mellitus with renal manifestations, not stated as uncontrolled(250.40)     Type II or unspecified type diabetes mellitus without mention of complication, not stated as uncontrolled     Unspecified vitamin D deficiency        Past Surgical History:        Procedure Laterality Date    ANKLE FRACTURE SURGERY      AV FISTULA CREATION  12/14/2021    BREAST SURGERY      CARPAL TUNNEL RELEASE      CHOLECYSTECTOMY, OPEN N/A     CORONARY ARTERY BYPASS GRAFT      x3    DIALYSIS FISTULA CREATION Left 12/14/2021    LEFT AV FISTULA CREATION UPPER EXTREMITY performed by Chandrakant Yoo MD at [address] [facility] IR TUNNELED CATHETER PLACEMENT GREATER THAN 5 YEARS  8/18/2021    IR TUNNELED CATHETER PLACEMENT GREATER THAN 5 YEARS 8/18/2021 [facility] SPECIAL PROCEDURES    KNEE ARTHROSCOPY      right    TONSILLECTOMY         Medications Prior to Admission:    Prior to Admission medications    Medication Sig Start Date End Date Taking?  Authorizing Provider   insulin glargine (BASAGLAR KWIKPEN) 100 UNIT/ML injection pen Inject 73 Units into the skin 2 times daily 11/30/21  Yes AFSANEH Moseley CNP   insulin aspart (NOVOLOG FLEXPEN) 100 UNIT/ML injection pen Sliding scale three times daily with meals as follows:  Glucose <139  No Insulin; 140-199  3 Units; 200-249  6 Units; 250-299  9 Units; 300-349  12 Units; 350-400  15 Units; Above 400  18 Units 11/23/21  Yes AFSANEH Costa CNP   ELIQUIS 5 MG TABS tablet Take 1 tablet by mouth 2 times daily 11/22/21  Yes Timur Arreola MD   gabapentin (NEURONTIN) 300 MG capsule Take 1 capsule by mouth 2 times daily for 30 days.  11/22/21 12/24/21 Yes Timur Arreola MD   traZODone (DESYREL) 50 MG tablet Take 1.5 tablets by mouth nightly 11/22/21  Yes Timur Arreola MD   furosemide (LASIX) 80 MG tablet Take 1 tablet by mouth 2 times daily 11/22/21  Yes Timur Arreola MD   febuxostat (ULORIC) 40 MG TABS tablet Take 1 tablet by mouth daily 11/22/21  Yes Timur Arreola MD   docusate sodium (COLACE) 100 MG capsule Take 1 capsule by mouth 2 times daily 11/22/21  Yes Timur Arreola MD   polyethylene glycol (GLYCOLAX) 17 g packet Take 17 g by mouth daily 11/23/21 12/24/21 Yes Timur Arreola MD   tamsulosin [facility]) 0.4 MG capsule Take 1 capsule by mouth daily 11/23/21  Yes Timur Arreola MD   atorvastatin (LIPITOR) 80 MG tablet Take 1 tablet by mouth daily 11/3/21  Yes AFSANEH Costa CNP   ranolazine (RANEXA) 1000 MG extended release tablet Take 1 tablet by mouth 2 times daily 9/25/21  Yes Poly Ortega MD   busPIRone (BUSPAR) 7.5 MG tablet Take 1 tablet by mouth 3 times daily 9/25/21  Yes Poly Ortega MD   carvedilol (COREG) 6.25 MG tablet Take 1 tablet by mouth 2 times daily 9/25/21  Yes Poly Ortega MD   pantoprazole (PROTONIX) 40 MG tablet Take 1 tablet by mouth every morning (before breakfast) 9/25/21  Yes Poly Ortega MD   senna (SENOKOT) 8.6 MG tablet Take 2 tablets by mouth daily as needed (Constipation) 8/25/21  Yes Poly Ortega MD   Insulin Pen Needle ([employer] PEN NEEDLES) 31G X 6 MM MISC 1 each by Does not apply route 5 times daily 12/23/21 3/23/22  AFSANEH Costa - CNP   HUMALOG 100 UNIT/ML injection vial 11/29/21   Historical Provider, MD   blood glucose test strips (TRUE METRIX BLOOD GLUCOSE TEST) strip 1 each by In Vitro route daily As needed. 11/30/21   AFSANEH Noel CNP   Blood Glucose Monitoring Suppl (TRUE METRIX GO GLUCOSE METER) w/Device KIT 1 each by Does not apply route 4 times daily 11/30/21   AFSANEH Noel CNP   Lancet Device MISC 1 Device by Does not apply route once for 1 dose 11/30/21 11/30/21  AFSANEH Noel CNP   Lancets MISC 1 each by Does not apply route daily 11/30/21   AFSANEH Noel CNP   aspirin 81 MG chewable tablet Take 1 tablet by mouth daily 9/25/21   Russ Samuel MD   allopurinol (ZYLOPRIM) 100 MG tablet Take 1 tablet by mouth daily 4/14/21   AFSANEH Noel CNP       Allergies:  Adhesive tape, Ace inhibitors, Iv dye [iodides], and Metformin and related    Social History:  The patient currently lives at home    TOBACCO:   reports that she has never smoked. She has never used smokeless tobacco.  ETOH:   reports no history of alcohol use. Family History:          Problem Relation Age of Onset    Diabetes Father     Heart Failure Father        PHYSICAL EXAM:    /72   Pulse 62   Temp 97.3 °F (36.3 °C)   Resp 16   SpO2 91%     General appearance: No apparent distress, sleeping  HEENT Normal cephalic, atraumatic without obvious deformity. Neck: Supple, No jugular venous distention/bruits. Lungs: Clear to auscultation, bilaterally without rales/wheezes/rhonchi with good respiratory effort. Heart: Regular rate and rhythm with Normal S1/S2 without murmurs, rubs or gallops  . CXR:  I have reviewed the CXR with the following interpretation:   Stable cardiomegaly with minimal central pulmonary congestion or pulmonary   artery hypertension which is less prominent.       Chronic obstructive lung changes with mild bibasilar linear atelectasis or   scarring which is less prominent.       No change in the right-sided dialysis catheter.      XR left foot -   Soft tissue swelling dorsally most pronounced over the forefoot.       No acute osseous abnormality identified however it would be impossible to   exclude osteomyelitis at the heads of the 4th and 5th metatarsals due to   marked juxta-articular osteoporosis at the 2nd through 5th MTP joints   superimposed on diffuse osteoporosis. XR right foot:   Soft tissue edema which is nonspecific and could be reactive.  Cellulitis   could produce a similar appearance.       Extensive chronic osseous changes.  No definite acute bony abnormality.  MRI   would be useful if symptoms persist.     CT head w/o contrast: No acute intracranial abnormality. CBC   Recent Labs     12/24/21  0035   WBC 6.8   HGB 10.4*   HCT 30.8*         RENAL  Recent Labs     12/24/21  0035 12/24/21  0608    144   K 3.7 4.2    109*   CO2 22 21   BUN 34* 34*   CREATININE 2.91* 2.89*     LFT'S  No results for input(s): AST, ALT, ALB, BILIDIR, BILITOT, ALKPHOS in the last 72 hours. COAG  No results for input(s): INR in the last 72 hours. CARDIAC ENZYMES  No results for input(s): CKTOTAL, CKMB, CKMBINDEX, TROPONINI in the last 72 hours.     U/A:    Lab Results   Component Value Date    COLORU Yellow 11/14/2021    WBCUA 5 TO 10 11/14/2021    RBCUA 0 TO 2 11/14/2021    MUCUS NOT REPORTED 11/14/2021    BACTERIA MANY 11/14/2021    SPECGRAV 1.014 11/14/2021    LEUKOCYTESUR TRACE 11/14/2021    GLUCOSEU 3+ 11/14/2021    AMORPHOUS NOT REPORTED 11/14/2021       ABG  No results found for: XCZ4SQH, BEART, H2TOYPXB, PHART, THGBART, KDV8OWE, PO2ART, YDL0VLN        Active Hospital Problems    Diagnosis Date Noted    Acute right-sided thoracic back pain [M54.6] 12/24/2021    Morbid obesity with BMI of 40.0-44.9, adult ([facility] [E66.01, Z68.41] 09/14/2021    [facility]schem[facility] [I63.9] 08/11/2021    Essential hypertension [I10] 05/03/2021    Diabetic polyneuropathy associated with type 2 diabetes mellitus ([facility]

## 2021-12-24 NOTE — DISCHARGE INSTR - COC
Continuity of Care Form    Patient Name: Emmett Escalante   :  1960  MRN:  [id number]    Admit date:  2021  Discharge date:  ***    Code Status Order: Full Code   Advance Directives:      Admitting Physician:  Basilia Blankenship MD  PCP: Sommer Ortiz, APRN - CNP    Discharging Nurse: [facility] Unit/Room#: 2063/2063-01  Discharging Unit Phone Number: [phone]    Emergency Contact:   Extended Emergency Contact Information  Primary Emergency Contact:  [address], [address] Phone: [phone]  Relation: Brother/Sister  Secondary Emergency Contact: [address]z [place], [address] Phone: [phone]  Relation: Brother/Sister    Past Surgical History:  Past Surgical History:   Procedure Laterality Date    ANKLE FRACTURE SURGERY      AV FISTULA CREATION  2021    BREAST SURGERY      CARPAL TUNNEL RELEASE      CHOLECYSTECTOMY, OPEN N/A     CORONARY ARTERY BYPASS GRAFT      x3    DIALYSIS FISTULA CREATION Left 2021    LEFT AV FISTULA CREATION UPPER EXTREMITY performed by Boone Reinoso MD at [facility]      IR TUNNELED [address] 5 YEARS  2021    IR TUNNELED [address] 5 YEARS 2021 [facility] SPECIAL PROCEDURES    KNEE ARTHROSCOPY      right    TONSILLECTOMY         Immunization History:   Immunization History   Administered Date(s) Administered    COVID-19, Pfizer, PF, 30mcg/0.3mL 2021, 2021    Influenza Virus Vaccine 10/18/2018, 09/10/2019, 2020    Influenza, MDCK Quadv, IM, PF (Flucelvax 2 yrs and older) 2021    Influenza, Quadv, IM, (6 mo and older Fluzone, Flulaval, Fluarix and 3 yrs and older Afluria) 10/16/2017    Influenza, Quadv, IM, PF (6 mo and older Fluzone, Flulaval, Fluarix, and 3 yrs and older Afluria) 10/18/2018, 09/10/2019, 2019, 2020    Pneumococcal Conjugate 13-valent (Zozjufx10) 2019    Pneumococcal Polysaccharide (Lbganlied80) 10/30/2014    Tdap (Boostrix, Adacel) 11/18/2017       Active Problems:  Patient Active Problem List   Diagnosis Code    Atherosclerosis of coronary artery bypass graft of native heart without angina pectoris I25.810    Acute renal failure (HCC) N17.9    Diabetes mellitus due to underlying condition with diabetic nephropathy, with long-term current use of insulin ([facility]) E08.21, Z79.4    Chronic diastolic heart failure ([facility]) I50.32    Diabetic polyneuropathy associated with type 2 diabetes mellitus ([facility]) E11.42    History of coronary artery bypass graft Z95.1    Iron deficiency anemia D50.9    Spinal stenosis of lumbar region with neurogenic claudication M48.062    Mixed hyperlipidemia E78.2    Stage 4 chronic kidney disease ([facility]) N18.4    Type 2 diabetes mellitus with kidney complication, with long-term current use of insulin ([facility]) E11.29, Z79.4    Syncope and collapse R55    Obesity, Class II, BMI 35-39.9 E66.9    Thyroid nodule greater than or equal to 1 cm in diameter incidentally noted on imaging study E04.1    Essential hypertension I10    Anemia in stage 4 chronic kidney disease (HCC) N18.4, D63.1    Chronic ischemic heart disease I25.9    Ischemic stroke of frontal lobe ([facility]) I63.9    Stroke-like symptoms R29.90    Morbid obesity with BMI of 40.0-44.9, adult ([facility]) E66.01, Z68.41    Acute encephalopathy G93.40    Disequilibrium syndrome E87.8    Debility R53.81    Long-term memory loss R41.3    Muscle right arm weakness M62.81    Anxiety F41.9    Chronic midline low back pain with bilateral sciatica M54.41, M54.42, G89.29    Need for immunization against influenza Z23    Altered mental status R41.82    Acute right-sided thoracic back pain M54.6       Isolation/Infection:   Isolation            Contact          Patient Infection Status       Infection Onset Added Last Indicated Last Indicated By Review Planned Expiration Resolved Resolved By    [facility] 08/03/20 08/13/21 08/13/21 Felix Bailey RN        Added from external infection. Nurse Assessment:  Last Vital Signs: /72   Pulse 62   Temp 97.3 °F (36.3 °C)   Resp 16   SpO2 91%     Last documented pain score (0-10 scale): Pain Level: 10  Last Weight:   Wt Readings from Last 1 Encounters:   12/14/21 251 lb (113.9 kg)     Mental Status:  oriented and alert    IV Access:  - None    Nursing Mobility/ADLs:  Walking   Assisted  Transfer  Assisted  Bathing  Assisted  Dressing  Assisted  Toileting  Assisted  Feeding  Independent  Med Admin  Independent  Med Delivery   whole    Dialysis  three times a week----M--W--F    Wound Care Documentation and Therapy:  Wound 08/11/21 Buttocks Right;Posterior (Active)   Number of days: 134       Wound 11/16/21 Toe (Comment  which one) Left; Medial great toe (Active)   Wound Image   11/17/21 1416   Wound Etiology Traumatic 11/23/21 0728   Dressing Status Other (Comment) 11/20/21 1021   Wound Cleansed Not Cleansed 11/20/21 1021   Dressing/Treatment Open to air 11/23/21 0728   Wound Length (cm) 1.5 cm 11/17/21 1416   Wound Width (cm) 1.2 cm 11/17/21 1416   Wound Depth (cm) 0.1 cm 11/17/21 1416   Wound Surface Area (cm^2) 1.8 cm^2 11/17/21 1416   Wound Volume (cm^3) 0.18 cm^3 11/17/21 1416   Wound Assessment Eschar dry 11/23/21 0728   Drainage Amount None 11/22/21 0912   Odor None 11/23/21 0728   Elena-wound Assessment Intact 11/23/21 0728   Margins Attached edges 11/23/21 0728   Number of days: 38       Wound 11/20/21 Thigh Anterior; Distal; Left (Active)   Wound Etiology Skin Tear 11/21/21 0836   Dressing Status Clean;Dry; Intact 11/21/21 0836   Wound Cleansed Soap and water 11/20/21 1056   Dressing/Treatment Dry dressing 11/21/21 0836   Wound Length (cm) 0.5 cm 11/20/21 1056   Wound Width (cm) 0.5 cm 11/20/21 1056   Wound Depth (cm) 0.1 cm 11/20/21 1056   Wound Surface Area (cm^2) 0.25 cm^2 11/20/21 1056   Wound Volume (cm^3) 0.025 cm^3 11/20/21 1056   Drainage Amount None 11/21/21 0836   Odor None 11/20/21 1056   Elena-wound Assessment Intact 11/21/21 4357   Number of days: 34        Elimination:  Continence: Bowel: Yes  Bladder: Yes  Urinary Catheter: None   Colostomy/Ileostomy/Ileal Conduit: No       Date of Last BM:1/7/2022  No intake or output data in the 24 hours ending 12/24/21 1109  No intake/output data recorded. Safety Concerns: At Risk for Falls    Impairments/Disabilities:      None    Nutrition Therapy:  Current Nutrition Therapy:   - Oral Diet:  Carb Control 4 carbs/meal (1800kcals/day) and Renal    Routes of Feeding: Oral  Liquids: No Restrictions  Daily Fluid Restriction: no  Last Modified Barium Swallow with Video (Video Swallowing Test): not done    Treatments at the Time of Hospital Discharge:   Respiratory Treatments: ***  Oxygen Therapy:  is not on home oxygen therapy. Ventilator:    - No ventilator support    Rehab Therapies: Physical Therapy and Occupational Therapy  Weight Bearing Status/Restrictions: No weight bearing restirctions  Other Medical Equipment (for information only, NOT a DME order):  wheelchair and walker  Other Treatments: Skilled Nursing Assessment Per Protocol. Medication Education & Monitoring.      Patient's personal belongings (please select all that are sent with patient):  Rose Marie    RN SIGNATUREAlmel Garcia RN    CASE MANAGEMENT/SOCIAL WORK SECTION    Inpatient Status Date: 12/23/2021    Readmission Risk Assessment Score:  Readmission Risk              Risk of Unplanned Readmission:  0           Discharging to Facility/ [address]  Phone:  [phone] [address]  Phone: [phone]  Fax [phone]     Dialysis Facility (if applicable)   Name:[facility]   Address: [facility]  Dialysis Schedule:M/W/F at [address] [place] [address]  Fax:    / signature: Electronically signed by Griselda Yu RN on 12/24/21 at 11:09 AM EST    PHYSICIAN SECTION    Prognosis: Good    Condition at Discharge: Stable    Rehab Potential (if transferring to Rehab): Good    Recommended Labs or Other Treatments After Discharge:     Physician Certification: I certify the above information and transfer of Pranav Salmeron  is necessary for the continuing treatment of the diagnosis listed and that she requires Acute Rehab for less 30 days.      Update Admission H&P: No change in H&P    PHYSICIAN SIGNATURE:  Electronically signed by Rick West MD on 12/28/21 at 8:31 AM EST

## 2021-12-24 NOTE — ED NOTES
IV insertion attempted x3.  Patient continues to thrash in the bed screaming in pain, unable to hold still for IV start     Neema Ware RN  12/23/21 8935

## 2021-12-24 NOTE — PROGRESS NOTES
Dialysis Safety Checks:    Patient ID Verified (Y/N) Y     -Hepatitis Test                   Date      Result  Hepatitis B Surface Antigen   21 Negative     Hepatitis B Surface Antibody 21 Positive = 48.5     Hepatitis B Core Antibody  21 Negative     -Treatment Initiation  Blood Vol Processed Goal (Liters)  Pump Speed x Treatment Hours x 60 Minutes    Target Fluid Removal 2000 ml     * Intra-treatment documented Safety Checks include the followin) Access and face visible at all times. 2) All connections and blood lines are secure with no kinks. 3) NVL alarm engaged. 4) Hemosafe device applied (if applicable). 5) No collapse of Arterial or Venous blood chambers. 6) All blood lines / pump segments in the air detectors.   --------------------------------------------------------------------------------  Report received from [address] [employer]

## 2021-12-24 NOTE — ED PROVIDER NOTES
[facility] ED     Emergency Department     Faculty Attestation        I performed a history and physical examination of the patient and discussed management with the resident. I reviewed the residents note and agree with the documented findings and plan of care. Any areas of disagreement are noted on the chart. I was personally present for the key portions of any procedures. I have documented in the chart those procedures where I was not present during the key portions. I have reviewed the emergency nurses triage note. I agree with the chief complaint, past medical history, past surgical history, allergies, medications, social and family history as documented unless otherwise noted below. Documentation of the HPI, Physical Exam and Medical Decision Making performed by medical students or scribes is based on my personal performance of the HPI, PE and MDM. For Physician Assistant/ Nurse Practitioner cases/documentation I have have had a face to face evaluation with this patient and have completed at least one if not all key elements of the E/M (history, physical exam, and MDM). Additional findings are as noted. Pertinent Comments     Right-sided back pain. Apparently complaining of it being like a spasm however not really giving me any consistent history right now. We will get labs, x-ray      3:57 AM EST  On further questioning seems patient may have missed dialysis. He is very inconsistent as historian. Creatinine is appropriate however her potassium is normal.  Do not think she needs emergent dialysis tonight. Will admit patient for further work-up. The care is provided during an unprecedented national emergency due to the novel coronavirus, COVID-19.     (Please note that portions of this note were completed with a voice recognition program.  Efforts were made to edit the dictations but occasionally words are mis-transcribed.)    Taj Rivas, DO  Attending Emergency Physician           Arnaud Del Toro, DO  12/24/21 Balbir Nelson, DO  12/24/21 4317

## 2021-12-24 NOTE — PROGRESS NOTES
[facility]    OCCUPATIONAL THERAPY MISSED TREATMENT NOTE   INPATIENT   Date: 21  Patient Name: Pranav Salmeron       Room: 2957/9273-94  MRN: [id number]   Account #: [de-identified]    : 1960  (59 y.o.)  Gender: female                 REASON FOR MISSED TREATMENT:  Other   -   Pt lethagric in bed upon arrival. Pt unable to be aroused at this time by verbal and tactile stimuli. Per RN Torrie De León, pt heavily medicated due to pain. Occupational therapy will continue to follow.           Renay Pretty OT

## 2021-12-24 NOTE — PROGRESS NOTES
Pt is lethargic and sleepy at this time. Pt is arousalable and answers questions appropriately at this time.  Pt is refusing to take am medications at this time

## 2021-12-24 NOTE — ED PROVIDER NOTES
[facility] ED  Emergency Department Encounter  Emergency Medicine Resident     Pt Name: Vitor Butt  MRN: [id number]  Jose 1960  Date of evaluation: 12/24/21  PCP:  AFSANEH Olguin CNP    CHIEF COMPLAINT       Chief Complaint   Patient presents with    Back Pain       HISTORY Helen  (Location/Symptom, Timing/Onset, Context/Setting, Quality, Duration, Modifying Ophelia Vazquez)      Vitor Butt is a 59 y.o. female with past medical history significant for end-stage renal disease on dialysis, CAD, diabetes mellitus via EMS with reports of back pain. Patient is alert although does not seem to be answering questions appropriately, history is unreliable with multiple stories being reported by patient. Patient reports back pain right-sided with no injury initially and then reports that she was picking something up and did have pain of the right side which happened a week ago. Patient does state that she has had prior incidence of back spasms and back pain. Patient denies any fevers, chills, chest pain, shortness of breath, abdominal pain, nausea or vomiting. Again patient denies trauma to the area. Patient states that the pain is nonradiating localized to the right side of mid back. Severe in nature, 10 out of 10 in severity. Sharp and stabbing in nature, constant, intermittently worsening    PAST MEDICAL / SURGICAL / SOCIAL / FAMILY HISTORY      has a past medical history of Backache, unspecified, CHF (congestive heart failure) ([facility] [address] Chronic kidney disease, Coronary atherosclerosis of artery bypass graft, Cramp of limb, Gallstones, Hypertension, Insomnia, Pneumonia, Type II or unspecified type diabetes mellitus with renal manifestations, not stated as uncontrolled(250.40), Type II or unspecified type diabetes mellitus without mention of complication, not stated as uncontrolled, and Unspecified vitamin D deficiency.      has a past surgical history that includes Coronary artery bypass graft; Knee arthroscopy; Carpal tunnel release; Breast surgery; Tonsillectomy; Hand surgery; Ankle fracture surgery; Cholecystectomy, open (N/A); IR TUNNELED CVC PLACE WO SQ PORT/PUMP > 5 YEARS (8/18/2021); AV fistula creation (12/14/2021); and Dialysis fistula creation (Left, 12/14/2021). Social:  reports that she has never smoked. She has never used smokeless tobacco. She reports that she does not drink alcohol and does not use drugs. Family Hx:   Family History   Problem Relation Age of Onset    Diabetes Father     Heart Failure Father         Allergies:  Adhesive tape, Ace inhibitors, Iv dye [iodides], and Metformin and related    Home Medications:  Prior to Admission medications    Medication Sig Start Date End Date Taking? Authorizing Provider   insulin glargine (BASAGLAR KWIKPEN) 100 UNIT/ML injection pen Inject 73 Units into the skin 2 times daily 11/30/21  Yes AFSANEH Ahn CNP   insulin aspart (NOVOLOG FLEXPEN) 100 UNIT/ML injection pen Sliding scale three times daily with meals as follows:  Glucose <139  No Insulin; 140-199  3 Units; 200-249  6 Units; 250-299  9 Units; 300-349  12 Units; 350-400  15 Units; Above 400  18 Units 11/23/21  Yes AFSANEH Ahn CNP   ELIQUIS 5 MG TABS tablet Take 1 tablet by mouth 2 times daily 11/22/21  Yes Nick Price MD   gabapentin (NEURONTIN) 300 MG capsule Take 1 capsule by mouth 2 times daily for 30 days.  11/22/21 12/24/21 Yes Nick Price MD   traZODone (DESYREL) 50 MG tablet Take 1.5 tablets by mouth nightly 11/22/21  Yes Nick Price MD   furosemide (LASIX) 80 MG tablet Take 1 tablet by mouth 2 times daily 11/22/21  Yes Nick Price MD   febuxostat (ULORIC) 40 MG TABS tablet Take 1 tablet by mouth daily 11/22/21  Yes Nick Price MD   docusate sodium (COLACE) 100 MG capsule Take 1 capsule by mouth 2 times daily 11/22/21  Yes Nick Price MD   polyethylene glycol (GLYCOLAX) 17 g packet Take 17 g by mouth daily 11/23/21 12/24/21 Yes Carson Ly MD   tamsulosin [facility]) 0.4 MG capsule Take 1 capsule by mouth daily 11/23/21  Yes Carson Ly MD   atorvastatin (LIPITOR) 80 MG tablet Take 1 tablet by mouth daily 11/3/21  Yes AFSANEH Rosa CNP   ranolazine (RANEXA) 1000 MG extended release tablet Take 1 tablet by mouth 2 times daily 9/25/21  Yes Sonam Mullins MD   busPIRone (BUSPAR) 7.5 MG tablet Take 1 tablet by mouth 3 times daily 9/25/21  Yes Sonam Mullins MD   carvedilol (COREG) 6.25 MG tablet Take 1 tablet by mouth 2 times daily 9/25/21  Yes Sonam Mullins MD   pantoprazole (PROTONIX) 40 MG tablet Take 1 tablet by mouth every morning (before breakfast) 9/25/21  Yes Sonam Mullins MD   senna (SENOKOT) 8.6 MG tablet Take 2 tablets by mouth daily as needed (Constipation) 8/25/21  Yes Sonam Mullins MD   Insulin Pen Needle ([employer] PEN NEEDLES) 31G X 6 MM MISC 1 each by Does not apply route 5 times daily 12/23/21 3/23/22  AFSANEH Rosa CNP   HUMALOG 100 UNIT/ML injection vial  11/29/21   Historical Provider, MD   blood glucose test strips (TRUE METRIX BLOOD GLUCOSE TEST) strip 1 each by In Vitro route daily As needed.  11/30/21   AFSANEH Rosa CNP   Blood Glucose Monitoring Suppl (TRUE METRIX GO GLUCOSE METER) w/Device KIT 1 each by Does not apply route 4 times daily 11/30/21   AFSANEH Rosa CNP   Lancet Device MISC 1 Device by Does not apply route once for 1 dose 11/30/21 11/30/21  AFSANEH Rosa CNP   Lancets MISC 1 each by Does not apply route daily 11/30/21   AFSANEH Rosa CNP   aspirin 81 MG chewable tablet Take 1 tablet by mouth daily 9/25/21   Sonam Mullins MD   allopurinol (ZYLOPRIM) 100 MG tablet Take 1 tablet by mouth daily 4/14/21   AFSANEH Rosa CNP       REVIEW OFSYSTEMS    (2-9 systems for level 4, 10 or more for level 5)      Review of Systems   Unable to perform ROS: Dementia       PHYSICAL EXAM   (up to 7 for level 4, 8 or more forlevel 5) INITIAL VITALS:   Vitals:    12/24/21 0250   BP: 126/68   Pulse: 69   Resp: 16   Temp:    SpO2: 94%        Physical Exam  Vitals and nursing note reviewed. Constitutional:       General: She is not in acute distress. Appearance: She is obese. She is not ill-appearing, toxic-appearing or diaphoretic. Comments: Patient appears nontoxic, non-lethargic although is drinking around the bed, yelling out in pain    HENT:      Head: Normocephalic and atraumatic. Nose: Nose normal.      Mouth/Throat:      Mouth: Mucous membranes are moist.      Pharynx: Oropharynx is clear. Eyes:      General:         Right eye: No discharge. Left eye: No discharge. Extraocular Movements: Extraocular movements intact. Pupils: Pupils are equal, round, and reactive to light. Neck:      Comments: No cervical spine midline tenderness, no thoracic or lumbar midline tenderness. Tenderness palpation of the paraspinal muscles Ela of the thoracic spine. No outward signs of injury. Atraumatic, no ecchymosis, lacerations, abrasions noted. Cardiovascular:      Rate and Rhythm: Normal rate and regular rhythm. Pulses: Normal pulses. Heart sounds: Normal heart sounds. Pulmonary:      Effort: Pulmonary effort is normal. No respiratory distress. Breath sounds: Normal breath sounds. Chest:       Abdominal:      General: There is no distension. Palpations: Abdomen is soft. Tenderness: There is no abdominal tenderness. There is no guarding. Musculoskeletal:      Cervical back: Normal range of motion. No rigidity. Right lower leg: No edema. Left lower leg: No edema. Comments: Moving all extremities equally, following commands intermittently,  strength intact and equal bilateral upper extremities. Pulses intact and equal bilateral radial arteries, dorsalis pedis, 2+. No outward signs of injury or trauma to the extremities. Skin:     General: Skin is warm and dry. Capillary Refill: Capillary refill takes less than 2 seconds. Neurological:      General: No focal deficit present. Mental Status: She is alert. Comments: Patient intermittently answering questions appropriately, keenly alert, moving all extremities equally   Psychiatric:      Comments: Patient not acting appropriately for age, impulsive         DIFFERENTIAL  DIAGNOSIS       DDX: Back spasm versus electrolyte abnormality versus acute on chronic back pain    Initial MDM/Plan: 59 y.o. female via EMS with reports of back pain. Upon my initial examination patient is writhing around the bed, yelling out, jerking around the bed, moving all extremities equally, localizing pain to the right mid back with no reports of trauma. Vital signs upon arrival are within normal limits including patient being afebrile, nontachycardic, normotensive, saturating 94% on room air. Patient is keenly alert, intermittently answering questions although history is unreliable and story is changing. We will obtain CBC and BMP due to patient's history of dialysis, will obtain chest x-ray due to patient's report of right-sided rib pain. We will plan for symptomatic relief initially with Norflex, patient is agitated, jerking around the room, unable to obtain IV access will provide Ativan, Benadryl, Haldol. Patient has difficult to obtain IV access and therefore work-up was prolonged. Ultrasound-guided IV placed, lab work obtained. Lab work unremarkable. Patient has required multiple doses of medication, continues to have intractable pain. We will plan for admission. EMERGENCY DEPARTMENT COURSE:  ED Course as of 12/24/21 0337   Fri Dec 24, 2021   0101 Patient has required multiple doses of pain medications, will admit for intractable back pain and pain management   [MA]      ED Course User Index  [MA] Ludy Branch DO      Patient admitted to Dr. Olvin Ross for intractable back pain.  Of note sister presented to bedside and states that patient does have reports of previous stroke with dementia which is undifferentiated at this time although they believe this to be stress related. She believes patient's back pain is stress related and exacerbated by current stressors. Patient reported that she received dialysis yesterday, sister is reporting that patient missed dialysis yesterday due to access issue. Patient is vitally stable with no signs of vascular congestion or fluid overload, electrolytes are stable, inpatient nephrology consult patient admitted in vitally stable condition after remained vitally stable throughout emergency department stay. Joaquim Alanis DIAGNOSTIC RESULTS / EMERGENCYDEPARTMENT COURSE / MDM     LABS:  Labs Reviewed   CBC WITH AUTO DIFFERENTIAL - Abnormal; Notable for the following components:       Result Value    RBC 3.18 (*)     Hemoglobin 10.4 (*)     Hematocrit 30.8 (*)     All other components within normal limits   BASIC METABOLIC PANEL W/ REFLEX TO MG FOR LOW K - Abnormal; Notable for the following components:    Glucose 104 (*)     BUN 34 (*)     CREATININE 2.91 (*)     GFR Non- 16 (*)     GFR  20 (*)     All other components within normal limits         RADIOLOGY:  XR FOOT LEFT (MIN 3 VIEWS)    Result Date: 12/24/2021  EXAMINATION: THREE XRAY VIEWS OF THE LEFT FOOT 12/24/2021 2:15 am COMPARISON: 11/19/2021 HISTORY: ORDERING SYSTEM PROVIDED HISTORY: reported possible trauma, no outward signs of trauma TECHNOLOGIST PROVIDED HISTORY: reported possible trauma, no outward signs of trauma Reason for Exam: reported possible trauma, no outward signs of trauma Additional signs and symptoms: reported possible trauma, no outward signs of trauma Relevant Medical/Surgical History: reported possible trauma, no outward signs of trauma FINDINGS: No fracture or dislocation evident. Marked osteoarthritic change at the tibiotalar joint. Mild osteoarthritic changes diffusely otherwise.   Bone density is significantly decreased with marked juxta-articular osteoporosis at the 2nd through 5th MTP joints. It would not be possible to exclude osteomyelitis particularly at the 4th and 5th metatarsal heads. Soft tissues are noted for dorsal edema most pronounced over the forefoot. There is marked atherosclerotic calcification. Soft tissue swelling dorsally most pronounced over the forefoot. No acute osseous abnormality identified however it would be impossible to exclude osteomyelitis at the heads of the 4th and 5th metatarsals due to marked juxta-articular osteoporosis at the 2nd through 5th MTP joints superimposed on diffuse osteoporosis. XR FOOT RIGHT (MIN 3 VIEWS)    Result Date: 12/24/2021  EXAMINATION: THREE XRAY VIEWS OF THE RIGHT FOOT 12/24/2021 2:15 am COMPARISON: None. HISTORY: ORDERING SYSTEM PROVIDED HISTORY: reported possible trauma, no outward signs of trauma TECHNOLOGIST PROVIDED HISTORY: reported possible trauma, no outward signs of trauma Reason for Exam: reported possible trauma, no outward signs of trauma Additional signs and symptoms: reported possible trauma, no outward signs of trauma Relevant Medical/Surgical History: reported possible trauma, no outward signs of trauma FINDINGS: Extensive osteopenia. Moderate diffuse soft tissue edema. Fairly extensive vascular calcifications and other scattered soft tissue calcification. Moderate-sized plantar calcaneal spur. Moderately advanced multifocal degenerative changes most evident at the 1st MTP joint. Deformity of the distal 1st phalanx may be related to previous trauma or surgery with partial amputation. This is favored to be chronic. No distinct acute fracture, dislocation or obvious osseous destruction. Soft tissue edema which is nonspecific and could be reactive. Cellulitis could produce a similar appearance. Extensive chronic osseous changes. No definite acute bony abnormality.   MRI would be useful if symptoms persist.     XR CHEST PORTABLE    Result Date: 12/24/2021  EXAMINATION: ONE XRAY VIEW OF THE CHEST 12/23/2021 11:35 pm COMPARISON: 11/10/2021 HISTORY: ORDERING SYSTEM PROVIDED HISTORY: right sided rib pain TECHNOLOGIST PROVIDED HISTORY: right sided rib pain Reason for Exam: right sided rib pain Additional signs and symptoms: right sided rib pain Relevant Medical/Surgical History: right sided rib pain FINDINGS: The heart is mildly enlarged but unchanged. The pulmonary vessels are less prominent. The lungs are hyperinflated and emphysematous. There are mild linear densities along the lung bases which is less prominent. No effusion or consolidation is seen. There is a dialysis catheter on the right which is unchanged in position. Stable cardiomegaly with minimal central pulmonary congestion or pulmonary artery hypertension which is less prominent. Chronic obstructive lung changes with mild bibasilar linear atelectasis or scarring which is less prominent. No change in the right-sided dialysis catheter. PROCEDURES:  None    CONSULTS:  IP CONSULT TO FAMILY MEDICINE  IP CONSULT TO NEPHROLOGY  IP CONSULT TO NEPHROLOGY      FINAL IMPRESSION      1. Acute right-sided thoracic back pain          DISPOSITION / PLAN     DISPOSITION Admitted 12/24/2021 01:32:31 AM      PATIENT REFERRED TO:  No follow-up provider specified.     DISCHARGE MEDICATIONS:  New Prescriptions    No medications on file       Valeri Guzman DO  Emergency Medicine Resident    (Please note that portions of this note were completed with a voice recognition program.Efforts were made to edit the dictations but occasionally words are mis-transcribed.)       Valeri Guzman DO  Resident  12/24/21 [id number]

## 2021-12-24 NOTE — PROGRESS NOTES
Pt new admit from ED to room 2063 with R thoracic back pain. Pt lethargic on arrival, but responds to voice. VSS. Currently in NAD. Will continue to monitor.

## 2021-12-24 NOTE — PROGRESS NOTES
[address]   OCCUPATIONAL THERAPY MISSED TREATMENT NOTE   INPATIENT   Date: 21  Patient Name: Maria Elena Pinedo       Room: 8849/7251-59  MRN: [id number]   Account #: [de-identified]    : 1960  (59 y.o.)  Gender: female                 REASON FOR MISSED TREATMENT:  Patient at testing and/or off the floor   -   Okay for OT/PT per RN. Upon arrival, pt sleeping and quite drowsy once writer was able to wake her. She agreed to participate in therapy. PT was assessing ROM/MMT seated EOB, when nursing entered informing writer/PT that patient needed to go to dialysis. Assisted patient with steps towards [address] and getting back into bed. OT will continue to follow and complete eval as able. ([id number]).       Marlin Hurst, OT

## 2021-12-24 NOTE — PROGRESS NOTES
pain  Pain Location: Back  Pain Orientation: Lower;Mid  Pain Descriptors: Spasm  Vital Signs  Patient Currently in Pain: Yes       Orientation  Orientation  Overall Orientation Status: Within Functional Limits  Social/Functional History  Social/Functional History  Lives With: Other (comment) (mother)  Type of Home: House (condo)  Home Layout: One level  Home Access: Ramped entrance  Bathroom Shower/Tub: Walk-in shower  Bathroom Toilet: Handicap height  Bathroom Equipment: Grab bars in shower,Built-in shower seat  Home Equipment: 4 wheeled walker,Cane,Sock aid,Reacher  ADL Assistance: Independent  Ambulation Assistance: Independent (wheeled walker for short distances)  Transfer Assistance: Independent  Active : No    Objective    AROM RLE (degrees)  RLE AROM: WFL  AROM LLE (degrees)  LLE AROM : WFL  Strength RLE  Strength RLE: WFL  Comment: grossly 3+/4/5  Strength LLE  Strength LLE: WFL  Comment: grossly 3+/4/5  Strength Other  Other: hip strength limited due to back pain with manual resistance        Bed mobility  Supine to Sit: Stand by assistance  Sit to Supine: Stand by assistance  Scooting: Stand by assistance  Transfers  Sit to Stand: Contact guard assistance  Stand to sit: Contact guard assistance  Ambulation  Ambulation?:  (started to ambulate pt when nursing came in to take pt to dialysis.  only able to side-step 3 steps up toward head of bed to return to bed to go to dialysis)     Balance  Sitting - Static: Fair;+ (SBA)  Sitting - Dynamic: Fair;+ (SBA)  Standing - Static: Fair (CGA)  Standing - Dynamic: Fair (CGA)        Plan   Plan  Times per week: 5-6x/wk  Current Treatment Recommendations: Strengthening,Balance Training,Functional Mobility Training,Transfer Training,Gait Training      Goals  Short term goals  Time Frame for Short term goals: 2-3 days  Short term goal 1: mod-I bed mobility  Short term goal 2: mod-I transfers  Short term goal 3: mod-I gait with RW x 50-100ft       Therapy Time Individual Concurrent Group Co-treatment   Time In 1057         Time Out 1107         Minutes [address], [address]

## 2021-12-25 LAB
ANION GAP SERPL CALCULATED.3IONS-SCNC: 12 MMOL/L (ref 9–17)
BUN BLDV-MCNC: 26 MG/DL (ref 8–23)
BUN/CREAT BLD: ABNORMAL (ref 9–20)
CALCIUM SERPL-MCNC: 8.7 MG/DL (ref 8.6–10.4)
CHLORIDE BLD-SCNC: 101 MMOL/L (ref 98–107)
CO2: 24 MMOL/L (ref 20–31)
CREAT SERPL-MCNC: 2.85 MG/DL (ref 0.5–0.9)
GFR AFRICAN AMERICAN: 20 ML/MIN
GFR NON-AFRICAN AMERICAN: 17 ML/MIN
GFR SERPL CREATININE-BSD FRML MDRD: ABNORMAL ML/MIN/{1.73_M2}
GFR SERPL CREATININE-BSD FRML MDRD: ABNORMAL ML/MIN/{1.73_M2}
GLUCOSE BLD-MCNC: 127 MG/DL (ref 65–105)
GLUCOSE BLD-MCNC: 170 MG/DL (ref 65–105)
GLUCOSE BLD-MCNC: 181 MG/DL (ref 65–105)
GLUCOSE BLD-MCNC: 210 MG/DL (ref 70–99)
GLUCOSE BLD-MCNC: 237 MG/DL (ref 65–105)
POTASSIUM SERPL-SCNC: 5.1 MMOL/L (ref 3.7–5.3)
SODIUM BLD-SCNC: 137 MMOL/L (ref 135–144)

## 2021-12-25 PROCEDURE — 97530 THERAPEUTIC ACTIVITIES: CPT

## 2021-12-25 PROCEDURE — 97110 THERAPEUTIC EXERCISES: CPT

## 2021-12-25 PROCEDURE — 99232 SBSQ HOSP IP/OBS MODERATE 35: CPT | Performed by: FAMILY MEDICINE

## 2021-12-25 PROCEDURE — 6370000000 HC RX 637 (ALT 250 FOR IP): Performed by: FAMILY MEDICINE

## 2021-12-25 PROCEDURE — G0378 HOSPITAL OBSERVATION PER HR: HCPCS

## 2021-12-25 PROCEDURE — 2580000003 HC RX 258: Performed by: FAMILY MEDICINE

## 2021-12-25 PROCEDURE — 6370000000 HC RX 637 (ALT 250 FOR IP): Performed by: STUDENT IN AN ORGANIZED HEALTH CARE EDUCATION/TRAINING PROGRAM

## 2021-12-25 PROCEDURE — 36415 COLL VENOUS BLD VENIPUNCTURE: CPT

## 2021-12-25 PROCEDURE — 82947 ASSAY GLUCOSE BLOOD QUANT: CPT

## 2021-12-25 PROCEDURE — 97166 OT EVAL MOD COMPLEX 45 MIN: CPT

## 2021-12-25 PROCEDURE — 80048 BASIC METABOLIC PNL TOTAL CA: CPT

## 2021-12-25 RX ADMIN — POLYETHYLENE GLYCOL 3350 17 G: 17 POWDER, FOR SOLUTION ORAL at 09:26

## 2021-12-25 RX ADMIN — INSULIN LISPRO 3 UNITS: 100 INJECTION, SOLUTION INTRAVENOUS; SUBCUTANEOUS at 07:32

## 2021-12-25 RX ADMIN — INSULIN LISPRO 3 UNITS: 100 INJECTION, SOLUTION INTRAVENOUS; SUBCUTANEOUS at 17:08

## 2021-12-25 RX ADMIN — BUSPIRONE HYDROCHLORIDE 7.5 MG: 5 TABLET ORAL at 09:23

## 2021-12-25 RX ADMIN — PANTOPRAZOLE SODIUM 40 MG: 40 TABLET, DELAYED RELEASE ORAL at 06:00

## 2021-12-25 RX ADMIN — FUROSEMIDE 80 MG: 40 TABLET ORAL at 07:31

## 2021-12-25 RX ADMIN — ATORVASTATIN CALCIUM 80 MG: 80 TABLET, FILM COATED ORAL at 09:23

## 2021-12-25 RX ADMIN — FEBUXOSTAT 40 MG: 40 TABLET, FILM COATED ORAL at 09:25

## 2021-12-25 RX ADMIN — INSULIN GLARGINE 73 UNITS: 100 INJECTION, SOLUTION SUBCUTANEOUS at 09:27

## 2021-12-25 RX ADMIN — DOCUSATE SODIUM 100 MG: 100 CAPSULE ORAL at 09:23

## 2021-12-25 RX ADMIN — ASPIRIN 81 MG: 81 TABLET, CHEWABLE ORAL at 09:23

## 2021-12-25 RX ADMIN — TRAZODONE HYDROCHLORIDE 75 MG: 50 TABLET ORAL at 21:30

## 2021-12-25 RX ADMIN — GABAPENTIN 300 MG: 300 CAPSULE ORAL at 09:23

## 2021-12-25 RX ADMIN — INSULIN LISPRO 6 UNITS: 100 INJECTION, SOLUTION INTRAVENOUS; SUBCUTANEOUS at 11:21

## 2021-12-25 RX ADMIN — GABAPENTIN 300 MG: 300 CAPSULE ORAL at 21:29

## 2021-12-25 RX ADMIN — APIXABAN 5 MG: 5 TABLET, FILM COATED ORAL at 09:23

## 2021-12-25 RX ADMIN — RANOLAZINE 1000 MG: 500 TABLET, FILM COATED, EXTENDED RELEASE ORAL at 09:23

## 2021-12-25 RX ADMIN — SODIUM CHLORIDE, PRESERVATIVE FREE 10 ML: 5 INJECTION INTRAVENOUS at 09:26

## 2021-12-25 RX ADMIN — RANOLAZINE 1000 MG: 500 TABLET, FILM COATED, EXTENDED RELEASE ORAL at 21:29

## 2021-12-25 RX ADMIN — DOCUSATE SODIUM 100 MG: 100 CAPSULE ORAL at 21:30

## 2021-12-25 RX ADMIN — FUROSEMIDE 80 MG: 40 TABLET ORAL at 17:08

## 2021-12-25 RX ADMIN — CARVEDILOL 6.25 MG: 6.25 TABLET, FILM COATED ORAL at 21:30

## 2021-12-25 RX ADMIN — CARVEDILOL 6.25 MG: 6.25 TABLET, FILM COATED ORAL at 09:23

## 2021-12-25 RX ADMIN — TAMSULOSIN HYDROCHLORIDE 0.4 MG: 0.4 CAPSULE ORAL at 09:22

## 2021-12-25 RX ADMIN — BUSPIRONE HYDROCHLORIDE 7.5 MG: 5 TABLET ORAL at 14:01

## 2021-12-25 RX ADMIN — INSULIN GLARGINE 73 UNITS: 100 INJECTION, SOLUTION SUBCUTANEOUS at 21:39

## 2021-12-25 RX ADMIN — APIXABAN 5 MG: 5 TABLET, FILM COATED ORAL at 21:29

## 2021-12-25 RX ADMIN — BUSPIRONE HYDROCHLORIDE 7.5 MG: 5 TABLET ORAL at 21:29

## 2021-12-25 NOTE — PLAN OF CARE
Problem: Falls - Risk of:  Goal: Will remain free from falls  Description: Will remain free from falls  Outcome: Ongoing  Note: No falls noted this shift. Bed kept in low position. Safe environment maintained. Bedside table & call light in reach. Uses call light appropriately when needing assistance. Goal: Absence of physical injury  Description: Absence of physical injury  Outcome: Ongoing     Problem: Pain:  Goal: Pain level will decrease  Description: Pain level will decrease  Outcome: Ongoing  Note: Patient reports significant improvement in pain. Will continue to monitor.   Goal: Control of acute pain  Description: Control of acute pain  Outcome: Ongoing

## 2021-12-25 NOTE — PROGRESS NOTES
(Left, 12/14/2021). Restrictions  Restrictions/Precautions: General Precautions,Fall Risk  Required Braces or Orthoses?: No     Vitals  Temp: 98.6 °F (37 °C)  Pulse: 77  Resp: 16  BP: (!) 111/50  Height: 5' 5\" (165.1 cm)  Weight: 248 lb 0.3 oz (112.5 kg)  BMI (Calculated): 41.4  Oxygen Therapy  SpO2: (!) 89 %  Pulse Oximeter Device Mode: Continuous  Pulse Oximeter Device Location: Right,Finger  O2 Device: Nasal cannula  O2 Flow Rate (L/min): 2 L/min  Level of Consciousness: Alert (0)    Subjective  Subjective: \"I feel so woozy. Can I skip walking today? \" Pt reported feeling quite lightheaded with positional changes with little relief from pursed lip breathing. Comments: Okay for OT eval/PT tx per DEDE Hester. Overall Orientation Status: Impaired  Orientation Level: Oriented to place,Oriented to time,Oriented to person,Disoriented to situation  Vision  Vision: Impaired  Vision Exceptions: Wears glasses for reading  Hearing  Hearing: Within functional limits  Social/Functional History  Lives With: Family (Mother)  Type of Home:  (Condo)  Home Layout: One level (First floor)  Home Access: Ramped entrance  Bathroom Shower/Tub: Walk-in shower  Bathroom Toilet: Handicap height  Bathroom Equipment: Grab bars in shower,Built-in shower seat  Bathroom Accessibility: [facility]: 4 wheeled [address] [facility] [address] aid,Reacher,Hospital bed  ADL Assistance: Independent  Homemaking Assistance: Independent  Homemaking Responsibilities: Yes  Ambulation Assistance: Independent (5FP for short distances)  Transfer Assistance: Independent  Active : No  Patient's  Info: family-sister  IADL Comments: sleeps in a flat bed, denies recent falls  Additional Comments: Pt's sister transports patient to/from grocery store. She uses electric scooter at [employer]. Objective      Cognition  Overall Cognitive Status: Impaired  Arousal/Alertness: Delayed responses to stimuli  Following Directions:  Follows one step commands  Attention Span: Attends with cues to redirect  Memory: Decreased recall of recent events (\"I had a dream someone took my nail polish off\")   Sensation  Overall Sensation Status: Impaired (Numbness in B feet (constant))   ADL  Feeding: Setup,Modified independent   Grooming: Setup,Modified independent   UE Bathing: Minimal assistance  LE Bathing: Minimal assistance  UE Dressing: Minimal assistance  LE Dressing: Minimal assistance  Toileting: Contact guard assistance  Additional Comments: ADL scores based on skilled observation and clinical reasoning, unless otherwise noted. Assistance required due to low endurance, lightheadedness, and fatiguing easily, impacting safety and ability to complete self care    UE Function           LUE Strength  L Hand General: 4-/5  LUE Strength Comment: Overall 4-/5     LUE Tone: Normotonic     LUE AROM (degrees)  LUE AROM : WFL     Left Hand AROM (degrees)  Left Hand AROM: WFL  RUE Strength  R Hand General: 4-/5  RUE Strength Comment: Overall 4-/5      RUE Tone: Normotonic     RUE AROM (degrees)  RUE AROM : WFL     Right Hand AROM (degrees)  Right Hand AROM: WFL    Fine Motor Skills  Coordination  Movements Are Fluid And Coordinated: No  Coordination and Movement description: Tremors,Right UE                           Mobility  Supine to Sit: Minimal assistance  Sit to Supine: Contact guard assistance       Balance  Sitting Balance: Stand by assistance  Standing Balance: Contact guard assistance  Standing Balance  Time: 1 minute, <30 seconds  Activity: functional transfers, functional mobility  Comment: with RW for UE support  Functional Mobility  Functional - Mobility Device: Rolling Walker  Activity:  (Side steps near EOB)  Assist Level: Contact guard assistance  Functional Mobility Comments: Attempted to ambulate in patient's room; however, she reported feeling significantly lightheaded upon standing. Pt stated, \"Can we not walk today? \" and sat EOB.    Bed mobility  Supine to Sit: Minimal assistance  Sit to Supine: Contact guard assistance  Scooting: Contact guard assistance  Comment: HOB elevated, increased time to complete. Assist with UB management. Transfers  Sit to stand: Contact guard assistance  Stand to sit: Contact guard assistance  Transfer Comments: Mod cues for hand placement for safety. Pt used momentum to complete sit->stand x2  Functional Activity Tolerance  Functional Activity Tolerance:  Tolerates 10 - 20 min exercise with multiple rests     Assessment  Assessment  Performance deficits / Impairments: Decreased functional mobility ,Decreased ADL status,Decreased strength,Decreased safe awareness,Decreased endurance,Decreased sensation,Decreased balance  Treatment Diagnosis: Impaired self-care status  Prognosis: Good  Decision Making: Medium Complexity  REQUIRES OT FOLLOW UP: Yes  Discharge Recommendations: Continue to assess pending progress  Activity Tolerance: Patient limited by fatigue,Patient Tolerated treatment well  Activity Tolerance: Pt is motivated to participate in therapy; however, she reports feeling significantly lightheaded with movement and fatigues easily         Functional Outcome Measures  AM-PAC Daily Activity Inpatient   How much help for putting on and taking off regular lower body clothing?: A Little  How much help for Bathing?: A Little  How much help for Toileting?: A Little  How much help for putting on and taking off regular upper body clothing?: A Little  How much help for taking care of personal grooming?: A Little  How much help for eating meals?: A Little  AM-[facility] Inpatient Daily Activity Raw Score: 18  AM-PAC Inpatient ADL T-Scale Score : 38.66  ADL Inpatient CMS 0-100% Score: 46.65  ADL Inpatient CMS G-Code Modifier : CK       Goals  Short term goals  Time Frame for Short term goals: By discharge  Short term goal 1: Patient will perform BADLs with mod I and good safety  Short term goal 2: Patient will tolerate standing 4+ minutes, with no LOB, mod I and good safety  Short term goal 3: Patient will perform transfers/functional mobility with mod I   Short term goal 4: Patient will V/D at least 3 EC/WS/fall prevention strategies to promote safety with daily routine  Short term goal 5: Patient will actively participate in 15+ minutes of therapeutic activity to promote independence with self care and mobility    Plan  Safety Devices  Safety Devices in place: Yes  Type of devices:  All fall risk precautions in place,Call light within reach,Gait belt,Patient at risk for falls,Left in bed,Nurse notified     Plan  Times per week: 4-5  Current Treatment Recommendations: Strengthening,ROM,Balance Training,Functional Mobility Training,Endurance Training,Safety Education & Training,Patient/Caregiver Education & Training,Equipment Evaluation, Education, & procurement,Self-Care / ADL       Equipment Recommendations  Equipment Needed: No  OT Individual Minutes  Time In: (P) [id number]  Time Out: (P) 1029  Minutes: (P) 33    Electronically signed by Rashmi Ortega OT on 12/25/21 at 11:47 AM EST         12/25/21 1146 12/25/21 1147   OT Individual Minutes   Time In [address]   Time Out [id number]   [id number] 17 [id number]

## 2021-12-25 NOTE — PROGRESS NOTES
Progress Note  Date:2021       Room:-  Patient Rogelio Keller     YOB: 1960     Age:61 y.o. Subjective    Subjective:  Symptoms:  Improved. (Pain improved, but difficulty getting up to bathroom; required walker and multiple assists. ). Diet:  Adequate intake. Activity level: Impaired due to weakness. Pain:  She complains of pain that is mild. Review of Systems  Objective         Vitals Last 24 Hours:  TEMPERATURE:  Temp  Av.5 °F (36.9 °C)  Min: 97.4 °F (36.3 °C)  Max: 99.4 °F (37.4 °C)  RESPIRATIONS RANGE: Resp  Av.8  Min: 16  Max: 18  PULSE OXIMETRY RANGE: SpO2  Av %  Min: 89 %  Max: 91 %  PULSE RANGE: Pulse  Av.5  Min: 52  Max: 78  BLOOD PRESSURE RANGE: Systolic (07BYY), NSW:196 , Min:107 , KIL:643   ; Diastolic (32BMZ), RXX:75, Min:41, Max:78    I/O (24Hr): No intake or output data in the 24 hours ending 21 1033  Objective:  General Appearance:  Comfortable. Vital signs: (most recent): Blood pressure (!) 111/50, pulse 77, temperature 98.6 °F (37 °C), temperature source Oral, resp. rate 16, height 5' 5\" (1.651 m), weight 248 lb 0.3 oz (112.5 kg), SpO2 (!) 89 %. Vital signs are normal.    Lungs:  Normal effort and normal respiratory rate. Breath sounds clear to auscultation. Heart: Normal rate. Regular rhythm. S1 normal and S2 normal.  Positive for murmur. Labs/Imaging/Diagnostics    Labs:  CBC:Recent Labs     21  0035   WBC 6.8   RBC 3.18*   HGB 10.4*   HCT 30.8*   MCV 96.9   RDW 14.5        CHEMISTRIES:  Recent Labs     21  0035 21  0608 21  0800    144 137   K 3.7 4.2 5.1    109* 101   CO2    BUN 34* 34* 26*   CREATININE 2.91* 2.89* 2.85*   GLUCOSE 104* 105* 210*     PT/INR:No results for input(s): PROTIME, INR in the last 72 hours. APTT:No results for input(s): APTT in the last 72 hours.   LIVER PROFILE:No results for input(s): AST, ALT, BILIDIR, BILITOT, ALKPHOS in the last 72 hours. Imaging Last 24 Hours:  XR FOOT LEFT (MIN 3 VIEWS)    Result Date: 12/24/2021  EXAMINATION: THREE XRAY VIEWS OF THE LEFT FOOT 12/24/2021 2:15 am COMPARISON: 11/19/2021 HISTORY: ORDERING SYSTEM PROVIDED HISTORY: reported possible trauma, no outward signs of trauma TECHNOLOGIST PROVIDED HISTORY: reported possible trauma, no outward signs of trauma Reason for Exam: reported possible trauma, no outward signs of trauma Additional signs and symptoms: reported possible trauma, no outward signs of trauma Relevant Medical/Surgical History: reported possible trauma, no outward signs of trauma FINDINGS: No fracture or dislocation evident. Marked osteoarthritic change at the tibiotalar joint. Mild osteoarthritic changes diffusely otherwise. Bone density is significantly decreased with marked juxta-articular osteoporosis at the 2nd through 5th MTP joints. It would not be possible to exclude osteomyelitis particularly at the 4th and 5th metatarsal heads. Soft tissues are noted for dorsal edema most pronounced over the forefoot. There is marked atherosclerotic calcification. Soft tissue swelling dorsally most pronounced over the forefoot. No acute osseous abnormality identified however it would be impossible to exclude osteomyelitis at the heads of the 4th and 5th metatarsals due to marked juxta-articular osteoporosis at the 2nd through 5th MTP joints superimposed on diffuse osteoporosis. XR FOOT RIGHT (MIN 3 VIEWS)    Result Date: 12/24/2021  EXAMINATION: THREE XRAY VIEWS OF THE RIGHT FOOT 12/24/2021 2:15 am COMPARISON: None.  HISTORY: ORDERING SYSTEM PROVIDED HISTORY: reported possible trauma, no outward signs of trauma TECHNOLOGIST PROVIDED HISTORY: reported possible trauma, no outward signs of trauma Reason for Exam: reported possible trauma, no outward signs of trauma Additional signs and symptoms: reported possible trauma, no outward signs of trauma Relevant Medical/Surgical History: reported possible trauma, no outward signs of trauma FINDINGS: Extensive osteopenia. Moderate diffuse soft tissue edema. Fairly extensive vascular calcifications and other scattered soft tissue calcification. Moderate-sized plantar calcaneal spur. Moderately advanced multifocal degenerative changes most evident at the 1st MTP joint. Deformity of the distal 1st phalanx may be related to previous trauma or surgery with partial amputation. This is favored to be chronic. No distinct acute fracture, dislocation or obvious osseous destruction. Soft tissue edema which is nonspecific and could be reactive. Cellulitis could produce a similar appearance. Extensive chronic osseous changes. No definite acute bony abnormality. MRI would be useful if symptoms persist.     CT HEAD WO CONTRAST    Result Date: 12/24/2021  EXAMINATION: CT OF THE HEAD WITHOUT CONTRAST  12/24/2021 2:40 am TECHNIQUE: CT of the head was performed without the administration of intravenous contrast. Dose modulation, iterative reconstruction, and/or weight based adjustment of the mA/kV was utilized to reduce the radiation dose to as low as reasonably achievable. COMPARISON: 11/10/2021 HISTORY: ORDERING SYSTEM PROVIDED HISTORY: AMS TECHNOLOGIST PROVIDED HISTORY: AMS Reason for Exam: AMS FINDINGS: BRAIN/VENTRICLES: There is no acute intracranial hemorrhage, mass effect or midline shift. No abnormal extra-axial fluid collection. The gray-white differentiation is maintained without evidence of an acute infarct. There is no evidence of hydrocephalus. Mild diffuse cerebral atrophy and chronic white matter ischemic change. ORBITS: The visualized portion of the orbits demonstrate no acute abnormality. Incidental note of focal posterior retinal calcifications on the right not consistent with drusen. SINUSES: The visualized paranasal sinuses and mastoid air cells demonstrate no acute abnormality.  SOFT TISSUES/SKULL:  No acute abnormality of the visualized skull or soft tissues. No acute intracranial abnormality. RECOMMENDATIONS: Unavailable     XR CHEST PORTABLE    Result Date: 12/24/2021  EXAMINATION: ONE XRAY VIEW OF THE CHEST 12/23/2021 11:35 pm COMPARISON: 11/10/2021 HISTORY: ORDERING SYSTEM PROVIDED HISTORY: right sided rib pain TECHNOLOGIST PROVIDED HISTORY: right sided rib pain Reason for Exam: right sided rib pain Additional signs and symptoms: right sided rib pain Relevant Medical/Surgical History: right sided rib pain FINDINGS: The heart is mildly enlarged but unchanged. The pulmonary vessels are less prominent. The lungs are hyperinflated and emphysematous. There are mild linear densities along the lung bases which is less prominent. No effusion or consolidation is seen. There is a dialysis catheter on the right which is unchanged in position. Stable cardiomegaly with minimal central pulmonary congestion or pulmonary artery hypertension which is less prominent. Chronic obstructive lung changes with mild bibasilar linear atelectasis or scarring which is less prominent. No change in the right-sided dialysis catheter. Assessment//Plan           Hospital Problems           Last Modified POA    * (Principal) Acute right-sided thoracic back pain 12/24/2021 Yes    Diabetic polyneuropathy associated with type 2 diabetes mellitus ([address] 12/24/2021 Yes    Spinal stenosis of lumbar region with neurogenic claudication 12/24/2021 Yes    Stage 4 chronic kidney disease ([address] 12/24/2021 Yes    Type 2 diabetes mellitus with kidney complication, with long-term current use of insulin ([address] 12/24/2021 Yes    Essential hypertension 12/24/2021 Yes    Ischemic stroke of frontal lobe ([address] 12/24/2021 Yes    Morbid obesity with BMI of 40.0-44.9, adult ([address] 12/24/2021 Yes        Assessment & Plan  Thoracic back pain - improved.  PT/OT, pain control    CKD 4 - per nephrology    Diabetes - sugars elevated    Electronically signed by Mercedes Curry MD on 12/25/21 at 10:33 AM EST

## 2021-12-25 NOTE — PROGRESS NOTES
Continuous Infusions:   dextrose      sodium chloride       PRN Meds:.senna, glucose, dextrose, glucagon (rDNA), dextrose, sodium chloride flush, sodium chloride, ondansetron **OR** ondansetron, polyethylene glycol, acetaminophen **OR** acetaminophen, tiZANidine, HYDROcodone 5 mg - acetaminophen, sodium citrate, sodium citrate    Physical Exam:    VITALS:  BP (!) 111/50   Pulse 77   Temp 98.6 °F (37 °C) (Oral)   Resp 16   Ht 5' 5\" (1.651 m)   Wt 248 lb 0.3 oz (112.5 kg)   SpO2 (!) 89%   BMI 41.27 kg/m²   24HR INTAKE/OUTPUT:  No intake or output data in the 24 hours ending 12/25/21 1118    Constitutional: slowed mentation and Lethargic and difficult to arouse    Skin: Skin color, texture, turgor normal. No rashes or lesions    Head: Normocephalic, without obvious abnormality, atraumatic     Cardiovascular/Edema: regular rate and rhythm, S1, S2 normal, no murmur, click, rub or gallop    Respiratory: Lungs: clear to auscultation bilaterally    Abdomen: soft, non-tender; bowel sounds normal; no masses,  no organomegaly    Back: symmetric, no curvature. ROM normal. No CVA tenderness. Extremities: edema +    Neuro:  Stuporous and lethargic.     CBC:   Recent Labs     12/24/21  0035   WBC 6.8   HGB 10.4*        BMP:    Recent Labs     12/24/21  0035 12/24/21  0608 12/25/21  0800    144 137   K 3.7 4.2 5.1    109* 101   CO2 22 21 24   BUN 34* 34* 26*   CREATININE 2.91* 2.89* 2.85*   GLUCOSE 104* 105* 210*     Lab Results   Component Value Date    NITRU NEGATIVE 11/14/2021    COLORU Yellow 11/14/2021    PHUR 5.0 11/14/2021    WBCUA 5 TO 10 11/14/2021    RBCUA 0 TO 2 11/14/2021    MUCUS NOT REPORTED 11/14/2021    TRICHOMONAS NOT REPORTED 11/14/2021    YEAST FEW 11/14/2021    BACTERIA MANY 11/14/2021    SPECGRAV 1.014 11/14/2021    LEUKOCYTESUR TRACE 11/14/2021    UROBILINOGEN Normal 11/14/2021    BILIRUBINUR NEGATIVE 11/14/2021    GLUCOSEU 3+ 11/14/2021    KETUA NEGATIVE 11/14/2021 AMORPHOUS NOT REPORTED 11/14/2021     Urine Sodium:     Lab Results   Component Value Date    JASON 47 11/14/2021     Urine Chloride:    Lab Results   Component Value Date    CLUR 26 11/14/2021     Urine Protein:     Lab Results   Component Value Date    TPU 20 11/12/2021     Urine Creatinine:     Lab Results   Component Value Date    LABCREA 72.0 11/14/2021     IMPRESSION/RECOMMENDATIONS:      1.  End-stage renal disease - we will maintain MWF hemodialysis schedule. Vascular access is IJ tunneled hemodialysis catheter. Renal diet,i.e 2-gram sodium,2-gram potassium,1500 ml fluid restriction,1-gram phosphorus, 1800 KCal and 1.2 gram protein per day. 2.  Altered mental status - rule out narcotic analgesia overdosage. blood glucose is within normal range. Sepsis will be ruled out also. Mental status is back to baseline. 3.   Normocytic anemia of chronic kidney disease - hemoglobin 10.4 g/dL is within target range. 4.  Systemic hypertension - blood pressure control is adequate. Prognosis is guarded.     Jose L Vargas MD FACP  Attending Nephrologist  12/25/2021 11:18 AM

## 2021-12-25 NOTE — PROGRESS NOTES
Pt asking to get up to use restroom. Writer attempted to get pt up out of bed. Pt still very unsteady and without enough hands to help, writer uncomfortable getting pt up alone at this time. Writer initiated ronnck to allow pt to use restroom until writer can get pt up.

## 2021-12-25 NOTE — PROGRESS NOTES
Writer informed Dr. Lenore Magallanes via clipkit that pt O2 was in the low 80s this morning.  2L via NC was initiated at that time and writer informed

## 2021-12-25 NOTE — PROGRESS NOTES
Physical Therapy  Facility/Department: [facility] MED SURG  Daily Treatment Note  NAME: Pranav Salmeron  : 1960  MRN: [id number]    Date of Service: 2021    Discharge Recommendations:  Home with assist PRN        Assessment   Body structures, Functions, Activity limitations: Decreased functional mobility ; Decreased strength; Increased pain  REQUIRES PT FOLLOW UP: Yes  Activity Tolerance  Activity Tolerance: Patient limited by fatigue     Patient Diagnosis(es): The encounter diagnosis was Acute right-sided thoracic back pain. has a past medical history of Backache, unspecified, CHF (congestive heart failure) ([facility] Chronic kidney disease, Coronary atherosclerosis of artery bypass graft, Cramp of limb, Gallstones, Hypertension, Insomnia, Pneumonia, Type II or unspecified type diabetes mellitus with renal manifestations, not stated as uncontrolled(250.40), Type II or unspecified type diabetes mellitus without mention of complication, not stated as uncontrolled, and Unspecified vitamin D deficiency. has a past surgical history that includes Coronary artery bypass graft; Knee arthroscopy; Carpal tunnel release; Breast surgery; Tonsillectomy; Hand surgery; Ankle fracture surgery; Cholecystectomy, open (N/A); IR TUNNELED CVC PLACE WO SQ PORT/PUMP > 5 YEARS (2021); AV fistula creation (2021); and Dialysis fistula creation (Left, 2021). Restrictions  Restrictions/Precautions  Restrictions/Precautions: General Precautions,Fall Risk  Required Braces or Orthoses?: No  Subjective   General  Family / Caregiver Present: No  Subjective  Subjective: Pt lying in bed, pleasant and agreeable to PT/OT  General Comment  Comments: DEDE mansfield. CoTx with LINDA Velez  Pain Screening  Patient Currently in Pain: Yes (Simultaneous filing. User may not have seen previous data.)  Vital Signs  Patient Currently in Pain: Yes (Simultaneous filing.  User may not have seen previous data.)  Oxygen Therapy  SpO2: (!) 89 %  Pulse Oximeter Device Mode: Continuous  Pulse Oximeter Device Location: Right;Finger  O2 Device: Nasal cannula  Patient Observation  Observations: Pt on 2L O2. Initially SPO2 at 89% at rest. Throughout treatment SPO2 improves to 92%       Orientation  Orientation  Overall Orientation Status: Within Functional Limits  Cognition      Objective   Bed mobility  Supine to Sit: Minimal assistance  Sit to Supine: Minimal assistance  Scooting: Stand by assistance  Transfers  Sit to Stand: Contact guard assistance  Stand to sit: Contact guard assistance  Ambulation  Ambulation?: Yes  Ambulation 1  Surface: level tile  Device: Rolling Walker  Assistance: Contact guard assistance  Distance: 3 lateral side steps to left towards [facility]  Comments: Pt c/o symptoms of fatigue and dizziness while standing prior to ambulating. Pt needing to sit initially before taking steps.  2nd Attempt, pt able to take 3 side steps towards [facility] to assist with pt positioning to return to bed     Balance  Sitting - Static: Fair;+ (SBA)  Sitting - Dynamic: Fair;+ (SBA)  Standing - Static: Fair (CGA)  Standing - Dynamic: Fair (CGA)  Other exercises  Other exercises?: Yes  Other exercises 1: Seated BLE exercises x 10 (at EOB)        Goals  Short term goals  Time Frame for Short term goals: 2-3 days  Short term goal 1: mod-I bed mobility  Short term goal 2: mod-I transfers  Short term goal 3: mod-I gait with RW x 50-100ft    Plan    Plan  Times per week: 5-6x/wk  Current Treatment Recommendations: Strengthening,Balance Training,Functional Mobility Training,Transfer Training,Gait Training     Therapy Time   Individual Concurrent Group Co-treatment   Time In 0956         Time Out 1023         Minutes [place] [address], PTA

## 2021-12-26 PROBLEM — R53.1 GENERALIZED WEAKNESS: Status: ACTIVE | Noted: 2021-09-19

## 2021-12-26 PROBLEM — R25.1 TREMOR: Status: ACTIVE | Noted: 2021-12-26

## 2021-12-26 PROBLEM — G93.41 METABOLIC ENCEPHALOPATHY: Status: ACTIVE | Noted: 2021-12-26

## 2021-12-26 LAB
AMMONIA: <10 UMOL/L (ref 11–51)
ANION GAP SERPL CALCULATED.3IONS-SCNC: 14 MMOL/L (ref 9–17)
BUN BLDV-MCNC: 44 MG/DL (ref 8–23)
BUN/CREAT BLD: ABNORMAL (ref 9–20)
CALCIUM SERPL-MCNC: 8.8 MG/DL (ref 8.6–10.4)
CHLORIDE BLD-SCNC: 98 MMOL/L (ref 98–107)
CO2: 23 MMOL/L (ref 20–31)
CREAT SERPL-MCNC: 4.6 MG/DL (ref 0.5–0.9)
GFR AFRICAN AMERICAN: 12 ML/MIN
GFR NON-AFRICAN AMERICAN: 10 ML/MIN
GFR SERPL CREATININE-BSD FRML MDRD: ABNORMAL ML/MIN/{1.73_M2}
GFR SERPL CREATININE-BSD FRML MDRD: ABNORMAL ML/MIN/{1.73_M2}
GLUCOSE BLD-MCNC: 213 MG/DL (ref 70–99)
POTASSIUM SERPL-SCNC: 4.8 MMOL/L (ref 3.7–5.3)
SODIUM BLD-SCNC: 135 MMOL/L (ref 135–144)

## 2021-12-26 PROCEDURE — 6370000000 HC RX 637 (ALT 250 FOR IP): Performed by: FAMILY MEDICINE

## 2021-12-26 PROCEDURE — 97530 THERAPEUTIC ACTIVITIES: CPT

## 2021-12-26 PROCEDURE — 99232 SBSQ HOSP IP/OBS MODERATE 35: CPT | Performed by: FAMILY MEDICINE

## 2021-12-26 PROCEDURE — 6370000000 HC RX 637 (ALT 250 FOR IP): Performed by: PSYCHIATRY & NEUROLOGY

## 2021-12-26 PROCEDURE — 2580000003 HC RX 258: Performed by: FAMILY MEDICINE

## 2021-12-26 PROCEDURE — 80048 BASIC METABOLIC PNL TOTAL CA: CPT

## 2021-12-26 PROCEDURE — 99254 IP/OBS CNSLTJ NEW/EST MOD 60: CPT | Performed by: PSYCHIATRY & NEUROLOGY

## 2021-12-26 PROCEDURE — G0378 HOSPITAL OBSERVATION PER HR: HCPCS

## 2021-12-26 PROCEDURE — 82140 ASSAY OF AMMONIA: CPT

## 2021-12-26 PROCEDURE — 36415 COLL VENOUS BLD VENIPUNCTURE: CPT

## 2021-12-26 PROCEDURE — 6370000000 HC RX 637 (ALT 250 FOR IP): Performed by: STUDENT IN AN ORGANIZED HEALTH CARE EDUCATION/TRAINING PROGRAM

## 2021-12-26 RX ORDER — TIZANIDINE 2 MG/1
2 TABLET ORAL 3 TIMES DAILY
Status: DISCONTINUED | OUTPATIENT
Start: 2021-12-26 | End: 2022-01-13 | Stop reason: HOSPADM

## 2021-12-26 RX ORDER — TIZANIDINE 4 MG/1
4 TABLET ORAL EVERY 6 HOURS PRN
Status: DISCONTINUED | OUTPATIENT
Start: 2021-12-26 | End: 2022-01-13 | Stop reason: HOSPADM

## 2021-12-26 RX ADMIN — INSULIN GLARGINE 73 UNITS: 100 INJECTION, SOLUTION SUBCUTANEOUS at 09:33

## 2021-12-26 RX ADMIN — INSULIN LISPRO 12 UNITS: 100 INJECTION, SOLUTION INTRAVENOUS; SUBCUTANEOUS at 12:08

## 2021-12-26 RX ADMIN — CARVEDILOL 6.25 MG: 6.25 TABLET, FILM COATED ORAL at 21:48

## 2021-12-26 RX ADMIN — BUSPIRONE HYDROCHLORIDE 7.5 MG: 5 TABLET ORAL at 15:35

## 2021-12-26 RX ADMIN — PANTOPRAZOLE SODIUM 40 MG: 40 TABLET, DELAYED RELEASE ORAL at 05:57

## 2021-12-26 RX ADMIN — TIZANIDINE 2 MG: 2 TABLET ORAL at 21:48

## 2021-12-26 RX ADMIN — BUSPIRONE HYDROCHLORIDE 7.5 MG: 5 TABLET ORAL at 21:47

## 2021-12-26 RX ADMIN — INSULIN GLARGINE 73 UNITS: 100 INJECTION, SOLUTION SUBCUTANEOUS at 21:49

## 2021-12-26 RX ADMIN — APIXABAN 5 MG: 5 TABLET, FILM COATED ORAL at 21:48

## 2021-12-26 RX ADMIN — BUSPIRONE HYDROCHLORIDE 7.5 MG: 5 TABLET ORAL at 09:30

## 2021-12-26 RX ADMIN — SODIUM CHLORIDE, PRESERVATIVE FREE 10 ML: 5 INJECTION INTRAVENOUS at 21:57

## 2021-12-26 RX ADMIN — FUROSEMIDE 80 MG: 40 TABLET ORAL at 12:07

## 2021-12-26 RX ADMIN — GABAPENTIN 300 MG: 300 CAPSULE ORAL at 21:48

## 2021-12-26 RX ADMIN — ATORVASTATIN CALCIUM 80 MG: 80 TABLET, FILM COATED ORAL at 09:30

## 2021-12-26 RX ADMIN — TRAZODONE HYDROCHLORIDE 75 MG: 50 TABLET ORAL at 21:47

## 2021-12-26 RX ADMIN — FEBUXOSTAT 40 MG: 40 TABLET, FILM COATED ORAL at 09:30

## 2021-12-26 RX ADMIN — TAMSULOSIN HYDROCHLORIDE 0.4 MG: 0.4 CAPSULE ORAL at 09:30

## 2021-12-26 RX ADMIN — POLYETHYLENE GLYCOL 3350 17 G: 17 POWDER, FOR SOLUTION ORAL at 09:31

## 2021-12-26 RX ADMIN — INSULIN LISPRO 6 UNITS: 100 INJECTION, SOLUTION INTRAVENOUS; SUBCUTANEOUS at 21:49

## 2021-12-26 RX ADMIN — DOCUSATE SODIUM 100 MG: 100 CAPSULE ORAL at 09:30

## 2021-12-26 RX ADMIN — RANOLAZINE 1000 MG: 500 TABLET, FILM COATED, EXTENDED RELEASE ORAL at 09:31

## 2021-12-26 RX ADMIN — APIXABAN 5 MG: 5 TABLET, FILM COATED ORAL at 09:30

## 2021-12-26 RX ADMIN — DOCUSATE SODIUM 100 MG: 100 CAPSULE ORAL at 21:47

## 2021-12-26 RX ADMIN — CARVEDILOL 6.25 MG: 6.25 TABLET, FILM COATED ORAL at 09:30

## 2021-12-26 RX ADMIN — RANOLAZINE 1000 MG: 500 TABLET, FILM COATED, EXTENDED RELEASE ORAL at 21:48

## 2021-12-26 RX ADMIN — GABAPENTIN 300 MG: 300 CAPSULE ORAL at 09:31

## 2021-12-26 RX ADMIN — ASPIRIN 81 MG: 81 TABLET, CHEWABLE ORAL at 09:30

## 2021-12-26 ASSESSMENT — PAIN SCALES - GENERAL
PAINLEVEL_OUTOF10: 0
PAINLEVEL_OUTOF10: 0

## 2021-12-26 NOTE — PROGRESS NOTES
Progress Note  Date:2021       Room:[facility]208-  Patient Annabelle Mandel     YOB: 1960     Age:61 y.o. Subjective    Subjective:  Symptoms:  (Getting up with assistance. Complains of shaking when she is trying to feed herself. ). Activity level: Impaired due to weakness. Pain:  She reports no pain. Review of Systems  Objective         Vitals Last 24 Hours:  TEMPERATURE:  Temp  Av.2 °F (36.8 °C)  Min: 97.8 °F (36.6 °C)  Max: 98.7 °F (37.1 °C)  RESPIRATIONS RANGE: Resp  Av.3  Min: 16  Max: 18  PULSE OXIMETRY RANGE: SpO2  Av.7 %  Min: 89 %  Max: 91 %  PULSE RANGE: Pulse  Av.6  Min: 66  Max: 76  BLOOD PRESSURE RANGE: Systolic (52CCH), XJS:849 , Min:108 , XCP:240   ; Diastolic (65YEH), LQC:23, Min:34, Max:75    I/O (24Hr): Intake/Output Summary (Last 24 hours) at 2021 0900  Last data filed at 2021 0657  Gross per 24 hour   Intake 360 ml   Output --   Net 360 ml     Objective:  General Appearance:  Comfortable. Vital signs: (most recent): Blood pressure 132/75, pulse 72, temperature 98.2 °F (36.8 °C), temperature source Oral, resp. rate 16, height 5' 5\" (1.651 m), weight 248 lb 0.3 oz (112.5 kg), SpO2 90 %. Vital signs are normal.    Neurological: (Unable to touch finger to nose). Labs/Imaging/Diagnostics    Labs:  CBC:  Recent Labs     21  0035   WBC 6.8   RBC 3.18*   HGB 10.4*   HCT 30.8*   MCV 96.9   RDW 14.5        CHEMISTRIES:  Recent Labs     21  0608 21  0800 21  0633    137 135   K 4.2 5.1 4.8   * 101 98   CO2    BUN 34* 26* 44*   CREATININE 2.89* 2.85* 4.60*   GLUCOSE 105* 210* 213*     PT/INR:No results for input(s): PROTIME, INR in the last 72 hours. APTT:No results for input(s): APTT in the last 72 hours. LIVER PROFILE:No results for input(s): AST, ALT, BILIDIR, BILITOT, ALKPHOS in the last 72 hours. Imaging Last 24 Hours:  No results found.   Assessment//Plan Hospital Problems           Last Modified POA    * (Principal) Acute right-sided thoracic back pain 12/24/2021 Yes    Diabetic polyneuropathy associated with type 2 diabetes mellitus ([facility] 12/24/2021 Yes    Spinal stenosis of lumbar region with neurogenic claudication 12/24/2021 Yes    Stage 4 chronic kidney disease ([address] 12/24/2021 Yes    Type 2 diabetes mellitus with kidney complication, with long-term current use of insulin ([facility] 12/24/2021 Yes    Essential hypertension 12/24/2021 Yes    Ischemic stroke of frontal lobe ([facility] 12/24/2021 Yes    Morbid obesity with BMI of 40.0-44.9, adult ([facility] 12/24/2021 Yes        Assessment & Plan  Thoracic pain - improved    Tremor/upper extremity movement disorder - consult neurology. Not knowing the patient, I am unsure how much of this is new and how much is residual from her stroke.       Electronically signed by Kyle Saini MD on 12/26/21 at 9:00 AM EST

## 2021-12-26 NOTE — FLOWSHEET NOTE
12/26/21 1352   Encounter Summary   Services provided to: Patient   Referral/Consult From: Nhi   Continue Visiting   (12-26-21)   Complexity of Encounter Low   Length of Encounter 15 minutes   Spiritual/Latter-day   Type Ritual   Intervention Anointing   Sacraments   Sacrament of Sick-Anointing Anointed  (Fr Haris Limb 12-26-21)

## 2021-12-26 NOTE — PROGRESS NOTES
Department of Internal Medicine  Nephrology Dameon Redmond MD   Consult Note    Reason for consultation: Management of hemodialysis dependent end-stage renal disease. Consulting physician: Uvaldo Gould MD    Interval history: Patient does not have any complaints today. Chronic back pain is back to baseline and she does not have shortness of breath. History of present illness: This is a 59 y.o. female with a significant past medical history of Chronic atrial fibrillation [on Eliquis], Type 2 diabetes mellitus, essential systemic hypertension, coronary artery disease [s/p CABG in 2004 and PTCA in 2019], heart failure with preserved ejection fraction [LVEF 55%] and end-stage renal disease secondary to diabetic and hypertensive nephropathy [on routine hemodialysis on a Monday/Wednesday/Friday schedule at [address] [place] [address] dialysis unit on [facility] under the care of Dr. Laurel Stanley since August 2001 using IJ tunneled dialysis catheter], who presented to the hospital via EMS for evaluation of worsening low back pain rated 10/10. She was seen and examined this morning and she remains quite lethargic and stuporous and difficult to arouse. She however is not in acute respiratory distress.     Scheduled Meds:   lidocaine  1 patch TransDERmal Daily    aspirin  81 mg Oral Daily    atorvastatin  80 mg Oral Daily    busPIRone  7.5 mg Oral TID    carvedilol  6.25 mg Oral BID    docusate sodium  100 mg Oral BID    apixaban  5 mg Oral BID    febuxostat  40 mg Oral Daily    furosemide  80 mg Oral BID    gabapentin  300 mg Oral BID    insulin glargine  73 Units SubCUTAneous BID    pantoprazole  40 mg Oral QAM AC    polyethylene glycol  17 g Oral Daily    ranolazine  1,000 mg Oral BID    tamsulosin  0.4 mg Oral Daily    traZODone  75 mg Oral Nightly    sodium chloride flush  5-40 mL IntraVENous 2 times per day    insulin lispro  0-18 Units SubCUTAneous TID WC    insulin lispro  0-9 Units SubCUTAneous Nightly     Continuous Infusions:   dextrose      sodium chloride       PRN Meds:.senna, glucose, dextrose, glucagon (rDNA), dextrose, sodium chloride flush, sodium chloride, ondansetron **OR** ondansetron, polyethylene glycol, acetaminophen **OR** acetaminophen, tiZANidine, HYDROcodone 5 mg - acetaminophen, sodium citrate, sodium citrate    Physical Exam:    VITALS:  /75   Pulse 72   Temp 98.2 °F (36.8 °C) (Oral)   Resp 16   Ht 5' 5\" (1.651 m)   Wt 248 lb 0.3 oz (112.5 kg)   SpO2 90%   BMI 41.27 kg/m²   24HR INTAKE/OUTPUT:      Intake/Output Summary (Last 24 hours) at 12/26/2021 1135  Last data filed at 12/26/2021 2169  Gross per 24 hour   Intake 360 ml   Output --   Net 360 ml       Constitutional: slowed mentation and Lethargic and difficult to arouse    Skin: Skin color, texture, turgor normal. No rashes or lesions    Head: Normocephalic, without obvious abnormality, atraumatic     Cardiovascular/Edema: regular rate and rhythm, S1, S2 normal, no murmur, click, rub or gallop    Respiratory: Lungs: clear to auscultation bilaterally    Abdomen: soft, non-tender; bowel sounds normal; no masses,  no organomegaly    Back: symmetric, no curvature. ROM normal. No CVA tenderness. Extremities: edema +    Neuro:  Stuporous and lethargic.     CBC:   Recent Labs     12/24/21  0035   WBC 6.8   HGB 10.4*        BMP:    Recent Labs     12/24/21  0608 12/25/21  0800 12/26/21  0633    137 135   K 4.2 5.1 4.8   * 101 98   CO2 21 24 23   BUN 34* 26* 44*   CREATININE 2.89* 2.85* 4.60*   GLUCOSE 105* 210* 213*     Lab Results   Component Value Date    NITRU NEGATIVE 11/14/2021    COLORU Yellow 11/14/2021    PHUR 5.0 11/14/2021    WBCUA 5 TO 10 11/14/2021    RBCUA 0 TO 2 11/14/2021    MUCUS NOT REPORTED 11/14/2021    TRICHOMONAS NOT REPORTED 11/14/2021    YEAST FEW 11/14/2021    BACTERIA MANY 11/14/2021    SPECGRAV 1.014 11/14/2021    LEUKOCYTESUR TRACE 11/14/2021    UROBILINOGEN Normal 11/14/2021 BILIRUBINUR NEGATIVE 11/14/2021    GLUCOSEU 3+ 11/14/2021    KETUA NEGATIVE 11/14/2021    AMORPHOUS NOT REPORTED 11/14/2021     Urine Sodium:     Lab Results   Component Value Date    JASON 47 11/14/2021     Urine Chloride:    Lab Results   Component Value Date    CLUR 26 11/14/2021     Urine Protein:     Lab Results   Component Value Date    TPU 20 11/12/2021     Urine Creatinine:     Lab Results   Component Value Date    LABCREA 72.0 11/14/2021     IMPRESSION/RECOMMENDATIONS:      1.  End-stage renal disease - we will maintain MWF hemodialysis schedule. Vascular access is IJ tunneled hemodialysis catheter. She had a left arm AV fistula placed 1 week ago by Dr. Nata Velásquez and it has a good bruit. Renal diet,i.e 2-gram sodium,2-gram potassium,1500 ml fluid restriction,1-gram phosphorus, 1800 KCal and 1.2 gram protein per day. 2.  Altered mental status - rule out narcotic analgesia overdosage. blood glucose is within normal range. Sepsis will be ruled out also. Mental status is back to baseline. 3.   Normocytic anemia of chronic kidney disease - hemoglobin 10.4 g/dL is within target range. 4.  Systemic hypertension - blood pressure control is adequate. Prognosis is guarded. No renal objection to discharge.     Evette Virgen MD FACP  Attending Nephrologist  12/26/2021 11:35 AM

## 2021-12-26 NOTE — PROGRESS NOTES
[address]   OCCUPATIONAL THERAPY MISSED TREATMENT NOTE   INPATIENT   Date: 21  Patient Name: Sin Mandel       Room: 1395/6800-90  MRN: [id number]   Account #: [de-identified]    : 1960  (59 y.o.)  Gender: female   Referring Practitioner: Ady Elias MD  Diagnosis: Acute right-sided thoracic back pain             REASON FOR MISSED TREATMENT:  Pt very fatigued this AM, difficulty holding conversation and keeping eyes open with writer. Pt requesting rest at this time, defer treatment. Will continue to follow.      [address], GARCIA/L

## 2021-12-26 NOTE — CONSULTS
58 yo wf with middle back pain and confusion . She has ESRD on hemodialysis presenting to [facility] ER on December 23 with right thoracic back pain that has been aggravating for the past one week . She reports to have thoracic pain for 6 weeks with intermittent stabbing and spasms that had increased prior to admission to [facility] . In ER she was noted to be stuporous with decreased level of arousal . Head CT normal .  She does report not to have missed any dialysis exchange. She has undergone hemodialysis in hospital becoming more awake although has had generalized weakness with trouble with ambulation walking only 3 lateral steps in PT along with noted hand tremor . She is diabetic on insulin on neurontin 300 mg po bid also on buspar 7.5 mg po tid . Significant medications neurontin 300 mg po bid , buspar 7.5 mg po tid .  Testing Head CT normal      Past Medical History:   Diagnosis Date    Backache, unspecified     CHF (congestive heart failure) (HCC)     Chronic kidney disease     Coronary atherosclerosis of artery bypass graft     Cramp of limb     Gallstones     Hypertension     Insomnia     Pneumonia     Type II or unspecified type diabetes mellitus with renal manifestations, not stated as uncontrolled(250.40)     Type II or unspecified type diabetes mellitus without mention of complication, not stated as uncontrolled     Unspecified vitamin D deficiency        Past Surgical History:   Procedure Laterality Date    ANKLE FRACTURE SURGERY      AV FISTULA CREATION  12/14/2021    BREAST SURGERY      CARPAL TUNNEL RELEASE      CHOLECYSTECTOMY, OPEN N/A     CORONARY ARTERY BYPASS GRAFT      x3    DIALYSIS FISTULA CREATION Left 12/14/2021    LEFT AV FISTULA CREATION UPPER EXTREMITY performed by Saba Conway MD at [address] [facility] IR TUNNELED CATHETER PLACEMENT GREATER THAN 5 YEARS  8/18/2021    IR TUNNELED CATHETER PLACEMENT GREATER THAN 5 YEARS in the Last Year: Not on file    Unstable Housing in the Last Year: Not on file       Current Facility-Administered Medications   Medication Dose Route Frequency Provider Last Rate Last Admin    lidocaine 4 % external patch 1 patch  1 patch TransDERmal Daily Erika Carroll DO   1 patch at 12/26/21 0931    aspirin chewable tablet 81 mg  81 mg Oral Daily Klye Saini MD   81 mg at 12/26/21 0930    atorvastatin (LIPITOR) tablet 80 mg  80 mg Oral Daily Kyle Saini MD   80 mg at 12/26/21 0930    busPIRone (BUSPAR) tablet 7.5 mg  7.5 mg Oral TID Kyle Saini MD   7.5 mg at 12/26/21 0930    carvedilol (COREG) tablet 6.25 mg  6.25 mg Oral BID Kyle Saini MD   6.25 mg at 12/26/21 0930    docusate sodium (COLACE) capsule 100 mg  100 mg Oral BID Kyle Saini MD   100 mg at 12/26/21 0930    apixaban (ELIQUIS) tablet 5 mg  5 mg Oral BID Kyle Saini MD   5 mg at 12/26/21 0930    febuxostat (ULORIC) tablet 40 mg  40 mg Oral Daily Kyle Saini MD   40 mg at 12/26/21 0930    furosemide (LASIX) tablet 80 mg  80 mg Oral BID Kyle Saini MD   80 mg at 12/26/21 1207    gabapentin (NEURONTIN) capsule 300 mg  300 mg Oral BID Kyle Saini MD   300 mg at 12/26/21 0931    insulin glargine (LANTUS) injection vial 73 Units  73 Units SubCUTAneous BID Kyle Saini MD   73 Units at 12/26/21 0933    pantoprazole (PROTONIX) tablet 40 mg  40 mg Oral QAM AC Kyle Saini MD   40 mg at 12/26/21 0557    polyethylene glycol (GLYCOLAX) packet 17 g  17 g Oral Daily Kyle Saini MD   17 g at 12/26/21 0931    ranolazine (RANEXA) extended release tablet 1,000 mg  1,000 mg Oral BID Kyle Saini MD   1,000 mg at 12/26/21 0931    senna (SENOKOT) tablet 17.2 mg  2 tablet Oral Daily PRN Kyle Saini MD        t[facility]) capsule 0.4 mg  0.4 mg Oral Daily Kyle Saini MD   0.4 mg at 12/26/21 9447    traZODone (DESYREL) tablet 75 mg  75 mg Oral Nightly Catalina Zhu MD   75 mg at 12/25/21 2130    glucose (GLUTOSE) 40 % oral gel 15 g  15 g Oral PRN Catalina Zhu MD        dextrose 50 % IV solution  12.5 g IntraVENous PRN Catalina Zhu MD        glucagon (rDNA) injection 1 mg  1 mg IntraMUSCular PRN Catalina Zhu MD        dextrose 5 % solution  100 mL/hr IntraVENous PRN Catalina Zhu MD        sodium chloride flush 0.9 % injection 5-40 mL  5-40 mL IntraVENous 2 times per day Catalina Zhu MD   10 mL at 12/25/21 0926    sodium chloride flush 0.9 % injection 5-40 mL  5-40 mL IntraVENous PRN Catalina Zhu MD        0.9 % sodium chloride infusion  25 mL IntraVENous PRN Catalina Zhu MD        ondansetron (ZOFRAN-ODT) disintegrating tablet 4 mg  4 mg Oral Q8H PRN Catalina Zhu MD        Or    ondansetron [facility]) injection 4 mg  4 mg IntraVENous Q6H PRN Catalina Zhu MD        polyethylene glycol [facility]) packet 17 g  17 g Oral Daily PRN Catalina Zhu MD        acetaminophen (TYLENOL) tablet 650 mg  650 mg Oral Q6H PRN Catalina Zhu MD        Or    acetaminophen (TYLENOL) suppository 650 mg  650 mg Rectal Q6H PRN Catalina Zhu MD        insulin lispro (HUMALOG) injection vial 0-18 Units  0-18 Units SubCUTAneous TID WC Catalina Zhu MD   12 Units at 12/26/21 1208    insulin lispro (HUMALOG) injection vial 0-9 Units  0-9 Units SubCUTAneous Nightly Catalina Zhu MD   5 Units at 12/24/21 2114    tiZANidine (ZANAFLEX) tablet 4 mg  4 mg Oral Q6H PRN Catalina Zhu MD        HYDROcodone-acetaminophen [facility]) 5-325 MG per tablet 1 tablet  1 tablet Oral Q6H PRN Catalina Zhu MD   1 tablet at 12/24/21 1244    sodium citrate 4 % injection 1.9 mL  1.9 mL IntraCATHeter PRN Fredis Armendariz MD   1.9 mL at 12/24/21 1551    sodium citrate 4 % injection 1.9 mL  1.9 mL IntraCATHeter PRN Cherri Landis MD   1.9 mL at 12/24/21 1551       Allergies   Allergen Reactions    Adhesive Tape Other (See Comments) and Rash     Other reaction(s): Unknown    Ace Inhibitors Other (See Comments)     cough    Iv Dye [Iodides]      Stage 4 kidney disease    Metformin And Related Hives, Itching and Rash       ROS:   Constitutional                  Negative for fever and chills   HEENT                            Negative for ear discharge, ear pain, nosebleed  Eyes                                Negative for photophobia, pain and discharge  Respiratory                      Negative for hemoptysis and sputum  Cardiovascular                Negative for orthopnea, claudication and PND  Gastrointestinal               Negative for abdominal pain, diarrhea, blood in stool  Musculoskeletal               Positive for thoracic pain   Skin                                 Negative for rash or itching  Endo/heme/allergies       Negative for polydipsia, environmental allergy  Psychiatric                       Negative for suicidal ideation. Patient is not anxious    Vitals:    12/26/21 1215   BP: (!) 107/40   Pulse: 66   Resp: 14   Temp: 98.1 °F (36.7 °C)   SpO2: 93%     Admission weight: 252 lb 13.9 oz (114.7 kg)    Neurological Examination  Constitutional .General exam well groomed   Head/ Ears /Nose/Throat/external ear . Normal exam  Neck and thyroid . Normal size. No bruits  Cardiovascular:  Auscultation of heart with regular rate and rhythm   Musculoskeletal. Muscle bulk and tone normal                                                           Muscle strength 5/5 strength throughout                                                                                No dysmetria or dysdiadokinesis  Positive astirixis with hands outstretched   Normal fine motor  Orientation Alert and oriented x 3   Attention and concentration normal  Short term memory 2 words out of 3 in one minute   Language process and speech normal . No aphasia   Cranial nerve 2 normal acuety and visual fields  Cranial nerve 3, 4 and 6 . Extraocular muscles are intact . Pupils are equal and reactive   Cranial nerve 5 . Intact corneal reflex. Normal facial sensation  Cranial nerve 7 normal exam   Cranial nerve 8. Grossly intact hearing   Cranial nerve 9 and 10. Symmetric palate elevation   Cranial nerve 11 , 5 out of 5 strength   Cranial Nerve 12 midline tongue . No atrophy  Sensation . Decrease pinprick and light touch in stocking distribution   Deep Tendon Reflexes hypoactive knee jerks with absent ankle jerks   Plantar response flexor bilaterally    Assessment :    Thoracic back pain . Metabolic encephalopathy . Astirixis . Generalized weakness     Plan:    MRI of Head . MRI cervical and thoracic spine no contrast . EEG . NH3 . PT/OT .  Zanaflex 2 mg po tid for spasms

## 2021-12-26 NOTE — PLAN OF CARE
Problem: Falls - Risk of:  Goal: Will remain free from falls  Description: Will remain free from falls  12/26/2021 0232 by Ree Lee RN  Outcome: Ongoing  12/25/2021 1724 by Henry Rodriguez RN  Outcome: Ongoing  Goal: Absence of physical injury  Description: Absence of physical injury  12/26/2021 0232 by Ree Lee RN  Outcome: Ongoing  12/25/2021 1724 by Henry Rodriguez RN  Outcome: Ongoing     Problem: Pain:  Goal: Pain level will decrease  Description: Pain level will decrease  12/26/2021 0232 by Ree Lee RN  Outcome: Ongoing  12/25/2021 1724 by Henry Rodriguez RN  Outcome: Ongoing  Goal: Control of acute pain  Description: Control of acute pain  12/26/2021 0232 by Ree Lee RN  Outcome: Ongoing  12/25/2021 1724 by Henry Rodriguez RN  Outcome: Ongoing     Problem: Skin Integrity:  Goal: Will show no infection signs and symptoms  Description: Will show no infection signs and symptoms  Outcome: Ongoing  Goal: Absence of new skin breakdown  Description: Absence of new skin breakdown  Outcome: Ongoing     Problem: Pain:  Goal: Pain level will decrease  Description: Pain level will decrease  12/26/2021 0232 by Ree Lee RN  Outcome: Ongoing  12/25/2021 1724 by Henry Rodriguez RN  Outcome: Ongoing     Problem: Skin Integrity:  Goal: Will show no infection signs and symptoms  Description: Will show no infection signs and symptoms  Outcome: Ongoing

## 2021-12-26 NOTE — PROGRESS NOTES
Physical Therapy  Facility/Department: [facility] PROGRESSIVE CARE  Daily Treatment Note  NAME: Maria Elena Pinedo  : 1960  MRN: [id number]    Date of Service: 2021    Discharge Recommendations:  Home with assist PRN        Assessment   Body structures, Functions, Activity limitations: Decreased functional mobility ; Decreased strength; Increased pain  REQUIRES PT FOLLOW UP: Yes  Activity Tolerance  Activity Tolerance: Patient limited by fatigue     Patient Diagnosis(es): The encounter diagnosis was Acute right-sided thoracic back pain. has a past medical history of Backache, unspecified, CHF (congestive heart failure) ([facility] [address] Chronic kidney disease, Coronary atherosclerosis of artery bypass graft, Cramp of limb, Gallstones, Hypertension, Insomnia, Pneumonia, Type II or unspecified type diabetes mellitus with renal manifestations, not stated as uncontrolled(250.40), Type II or unspecified type diabetes mellitus without mention of complication, not stated as uncontrolled, and Unspecified vitamin D deficiency. has a past surgical history that includes Coronary artery bypass graft; Knee arthroscopy; Carpal tunnel release; Breast surgery; Tonsillectomy; Hand surgery; Ankle fracture surgery; Cholecystectomy, open (N/A); IR TUNNELED CVC PLACE WO SQ PORT/PUMP > 5 YEARS (2021); AV fistula creation (2021); and Dialysis fistula creation (Left, 2021). Restrictions  Restrictions/Precautions  Restrictions/Precautions: General Precautions,Fall Risk  Required Braces or Orthoses?: No  Subjective   General  Family / Caregiver Present: No  Subjective  Subjective: Pt lying in bed, pleasant and agreeable to PT tx  General Comment  [facility]  Pain Screening  Patient Currently in Pain: Denies  Vital Signs  Patient Currently in Pain: Denies  Patient Observation  Observations: Difficulties for pt to keep eyes open. Pt demos Continuous tremors in B hands.  Pt seemingly more confused compared to previous date. Orientation  Orientation  Overall Orientation Status: Within Functional Limits  Cognition      Objective   Bed mobility  Supine to Sit: Moderate assistance  Sit to Supine: Minimal assistance  Scooting: Stand by assistance  Transfers  Sit to Stand: Minimal Assistance  Stand to sit: Minimal Assistance  Comment: Pt unsteady while standing. B knees buckling intermittently while standing. Returned pt to sitting EOB  Ambulation  Ambulation?: No (unsafe on this date)     Balance  Sitting - Static: Fair;+ (Jose progressing to CGA)  Sitting - Dynamic: Fair;- (Jose)  Standing - Static: Poor  Other exercises  Other exercises?: Yes  Other exercises 1: Seated EOB 10'  (lightheaded initially sitting EOB)  Other exercises 2: STS x 3 (Pt unstable at this time standing.  Knees buckling when stand)        Goals  Short term goals  Time Frame for Short term goals: 2-3 days  Short term goal 1: mod-I bed mobility  Short term goal 2: mod-I transfers  Short term goal 3: mod-I gait with RW x 50-100ft    Plan    Plan  Times per week: 5-6x/wk  Current Treatment Recommendations: Strengthening,Balance Training,Functional Mobility Training,Transfer Training,Gait Training     Therapy Time   Individual Concurrent Group Co-treatment   Time In 0820         Time Out 0850         Minutes 27                 Guilherme Arzola, PTA

## 2021-12-26 NOTE — PROGRESS NOTES
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

## 2021-12-26 NOTE — PROGRESS NOTES
[address] [facility]    INPATIENT OCCUPATIONAL THERAPY  PROGRESS NOTE  Date: 2021  Patient Name: Marlin Delgado      Room: 2835/5908-03  MRN: [id number]    : 1960  (62 y.o.) Gender: female     Discharge Recommendations:  Further Occupational Therapy is recommended upon facility discharge. Equipment Needed: No    Referring Practitioner: Alexandro Somers MD  Diagnosis: Acute right-sided thoracic back pain  General  Chart Reviewed: Yes  Patient assessed for rehabilitation services?: Yes  Additional Pertinent Hx: 59 y.o. female with past medical history significant for end-stage renal disease on dialysis, CAD, diabetes mellitus via EMS with reports of back pain. Patient is alert although does not seem to be answering questions appropriately, history is unreliable with multiple stories being reported by patient. Patient reports back pain right-sided with no injury initially and then reports that she was picking something up and did have pain of the right side which happened a week ago. Patient does state that she has had prior incidence of back spasms and back pain. Family / Caregiver Present: No  Referring Practitioner: Alexandro Somers MD  Diagnosis: Acute right-sided thoracic back pain    Restrictions  Restrictions/Precautions: General Precautions,Fall Risk  Required Braces or Orthoses?: No      Subjective  Subjective: Pt reports feeling better since being able to get some sleep. Pt reports holding and managing utensils during lunch was difficult. Comments: Pt pleasant and motivated for treatment session, PCT arrived to initiate shower while writer in room due to pt need for 2 person assist out of bed, [facility]. Patient Currently in Pain: Denies  Overall Orientation Status: Impaired  Orientation Level: Oriented to place;Oriented to time;Oriented to person;Disoriented to situation  Patient Observation  Observations: B tremors in UE.         Objective  Cognition  Overall Cognitive Status: Impaired  Arousal/Alertness: Delayed responses to stimuli  Following Directions: Follows one step commands  Attention Span: Attends with cues to redirect  Memory: Decreased recall of recent events  Bed mobility  Supine to Sit: Contact guard assistance  Comment: HOB elevated, increased time. Pt reports some dizziness upon sitting EOB however reports diminishes with simple breathing techniques. Pt with x1 posterior LOB with min A for recovery. Balance  Sitting Balance: Minimal assistance (SBA initially, min A due to posterior LOB. )  Standing Balance: Minimal assistance (Assist x2 )  Standing Balance  Time: ~1 min   Activity: standing EOB, x1 step with RW towards bathroom. Comment: BLE's buckled suddenly; pt describes as tremors in both legs, stating RLE went out first. PCT and writer both assisted in control sit to floor. DEDE Sin notified, additional PCT's arrived to assist pt off floor and sit in arm chair. ADL  Feeding: Adaptive utensils (comment) (provided built up handles for utensils to improve grasp/cont)  Grooming: Setup (built up handle provided for toothbrush)  UE Bathing: Minimal assistance (clinical reasoning based on ROM/control of UE's)  LE Bathing: Maximum assistance (clinical reasoning this date. )  UE Dressing: Minimal assistance (to manage gown. )  LE Dressing: Maximum assistance (clinical reasoning this date. )  Toileting: None (pt incontinent of urine )  Additional Comments: Attempted to complete shower this date with PCT assist to ensure pt safety and improve pt functional activity engagement/tolerance. Shower Ok'd by [place]. However pt unsuccessful with transfer to shower, bathing completed while seated in chair. Transfers  Sit to stand: 2 Person assistance;Contact guard assistance  Stand to sit: 2 Person assistance; Moderate assistance;Maximum assistance (lowered to floor. )  Transfer Comments: Pt emotional and tearful with outcome of standing/stepping with RW. (emotional support provided. )  Type of ROM/Therapeutic Exercise  Type of ROM/Therapeutic Exercise: AROM  Comment: B arm raises, elbow curls, hand graps to address B UE ROM/endurance/GMC   Exercises  Grasp/Release: 1st setting spring gripper; R hand x15, L hand x11 tolerated. Other: Writer provided pt with pink resistive sponge for in home to address B hand strength/endurance and utilize for edema reduction. Pt completed x5 sets of opposition digit ex's in fair-good control. Rest breaks due to fatigue. Assessment  Performance deficits / Impairments: Decreased functional mobility ; Decreased ADL status; Decreased strength;Decreased safe awareness;Decreased endurance;Decreased sensation;Decreased balance  Prognosis: Good  Discharge Recommendations: Continue to assess pending progress  Activity Tolerance: Patient limited by fatigue;Treatment limited secondary to medical complications (free text) (tremors. )  Safety Devices in place: Yes  Type of devices: All fall risk precautions in place;Call light within reach;Gait belt;Patient at risk for falls;Nurse notified; Left in chair (PCT assisting pt in remainder of care tasks. )           Plan  Safety Devices  Safety Devices in place: Yes  Type of devices:  All fall risk precautions in place,Call light within reach,Gait belt,Patient at risk for falls,Nurse notified,Left in chair (PCT assisting pt in remainder of care tasks. )  Plan  Times per week: 4-5  Times per day: Daily  Current Treatment Recommendations: Strengthening,ROM,Balance Training,Functional Mobility Jose Alfredo Ren Education & Training,Patient/Caregiver Education & Training,Equipment Evaluation, Education, & procurement,Self-Care / ADL      Goals  Short term goals  Time Frame for Short term goals: By discharge  Short term goal 1: Patient will perform BADLs with mod I and good safety  Short term goal 2: Patient will tolerate standing 4+ minutes, with no LOB, mod I and good safety  Short term goal 3: Patient will perform transfers/functional mobility with mod I   Short term goal 4: Patient will V/D at least 3 EC/WS/fall prevention strategies to promote safety with daily routine  Short term goal 5: Patient will actively participate in 15+ minutes of therapeutic activity to promote independence with self care and mobility    OT Individual Minutes  Time In: 1453  Time Out: 1527  Minutes: 34    Electronically signed by DAYANA Fields on 12/26/21 at 4:07 PM EST

## 2021-12-26 NOTE — CARE COORDINATION
ONGOING DISCHARGE PLAN:    Patient is alert and oriented x4. Spoke with patient regarding discharge plan and patient confirms that plan is still to return to home with  [facility] neuro consult    Dialysis patient MWF @ [facility]    Will continue to follow for additional discharge needs.     Electronically signed by Jefe Mccollum RN on 12/26/2021 at 12:13 PM

## 2021-12-27 LAB
ANION GAP SERPL CALCULATED.3IONS-SCNC: 14 MMOL/L (ref 9–17)
BUN BLDV-MCNC: 58 MG/DL (ref 8–23)
BUN/CREAT BLD: ABNORMAL (ref 9–20)
CALCIUM SERPL-MCNC: 8.8 MG/DL (ref 8.6–10.4)
CHLORIDE BLD-SCNC: 96 MMOL/L (ref 98–107)
CO2: 23 MMOL/L (ref 20–31)
CREAT SERPL-MCNC: 5.16 MG/DL (ref 0.5–0.9)
GFR AFRICAN AMERICAN: 10 ML/MIN
GFR NON-AFRICAN AMERICAN: 8 ML/MIN
GFR SERPL CREATININE-BSD FRML MDRD: ABNORMAL ML/MIN/{1.73_M2}
GFR SERPL CREATININE-BSD FRML MDRD: ABNORMAL ML/MIN/{1.73_M2}
GLUCOSE BLD-MCNC: 161 MG/DL (ref 65–105)
GLUCOSE BLD-MCNC: 163 MG/DL (ref 65–105)
GLUCOSE BLD-MCNC: 186 MG/DL (ref 65–105)
GLUCOSE BLD-MCNC: 200 MG/DL (ref 65–105)
GLUCOSE BLD-MCNC: 217 MG/DL (ref 65–105)
GLUCOSE BLD-MCNC: 266 MG/DL (ref 70–99)
GLUCOSE BLD-MCNC: 280 MG/DL (ref 65–105)
GLUCOSE BLD-MCNC: 322 MG/DL (ref 65–105)
GLUCOSE BLD-MCNC: 329 MG/DL (ref 65–105)
POTASSIUM SERPL-SCNC: 5 MMOL/L (ref 3.7–5.3)
SODIUM BLD-SCNC: 133 MMOL/L (ref 135–144)

## 2021-12-27 PROCEDURE — 82947 ASSAY GLUCOSE BLOOD QUANT: CPT

## 2021-12-27 PROCEDURE — 80048 BASIC METABOLIC PNL TOTAL CA: CPT

## 2021-12-27 PROCEDURE — 99254 IP/OBS CNSLTJ NEW/EST MOD 60: CPT | Performed by: STUDENT IN AN ORGANIZED HEALTH CARE EDUCATION/TRAINING PROGRAM

## 2021-12-27 PROCEDURE — 90935 HEMODIALYSIS ONE EVALUATION: CPT

## 2021-12-27 PROCEDURE — 36415 COLL VENOUS BLD VENIPUNCTURE: CPT

## 2021-12-27 PROCEDURE — G0378 HOSPITAL OBSERVATION PER HR: HCPCS

## 2021-12-27 PROCEDURE — 6370000000 HC RX 637 (ALT 250 FOR IP): Performed by: FAMILY MEDICINE

## 2021-12-27 PROCEDURE — 2500000003 HC RX 250 WO HCPCS: Performed by: INTERNAL MEDICINE

## 2021-12-27 PROCEDURE — 97110 THERAPEUTIC EXERCISES: CPT

## 2021-12-27 PROCEDURE — 6370000000 HC RX 637 (ALT 250 FOR IP): Performed by: PSYCHIATRY & NEUROLOGY

## 2021-12-27 PROCEDURE — 2580000003 HC RX 258: Performed by: FAMILY MEDICINE

## 2021-12-27 PROCEDURE — 6370000000 HC RX 637 (ALT 250 FOR IP): Performed by: STUDENT IN AN ORGANIZED HEALTH CARE EDUCATION/TRAINING PROGRAM

## 2021-12-27 PROCEDURE — 97530 THERAPEUTIC ACTIVITIES: CPT

## 2021-12-27 RX ADMIN — ASPIRIN 81 MG: 81 TABLET, CHEWABLE ORAL at 10:03

## 2021-12-27 RX ADMIN — FEBUXOSTAT 40 MG: 40 TABLET, FILM COATED ORAL at 16:29

## 2021-12-27 RX ADMIN — ATORVASTATIN CALCIUM 80 MG: 80 TABLET, FILM COATED ORAL at 16:30

## 2021-12-27 RX ADMIN — DOCUSATE SODIUM 100 MG: 100 CAPSULE ORAL at 10:03

## 2021-12-27 RX ADMIN — RANOLAZINE 1000 MG: 500 TABLET, FILM COATED, EXTENDED RELEASE ORAL at 10:04

## 2021-12-27 RX ADMIN — ACETAMINOPHEN 650 MG: 325 TABLET, FILM COATED ORAL at 19:37

## 2021-12-27 RX ADMIN — SODIUM CHLORIDE, PRESERVATIVE FREE 10 ML: 5 INJECTION INTRAVENOUS at 21:25

## 2021-12-27 RX ADMIN — INSULIN GLARGINE 73 UNITS: 100 INJECTION, SOLUTION SUBCUTANEOUS at 22:23

## 2021-12-27 RX ADMIN — TAMSULOSIN HYDROCHLORIDE 0.4 MG: 0.4 CAPSULE ORAL at 10:03

## 2021-12-27 RX ADMIN — TRAZODONE HYDROCHLORIDE 75 MG: 50 TABLET ORAL at 21:23

## 2021-12-27 RX ADMIN — INSULIN GLARGINE 73 UNITS: 100 INJECTION, SOLUTION SUBCUTANEOUS at 10:04

## 2021-12-27 RX ADMIN — Medication 1.9 ML: at 13:41

## 2021-12-27 RX ADMIN — INSULIN LISPRO 6 UNITS: 100 INJECTION, SOLUTION INTRAVENOUS; SUBCUTANEOUS at 22:23

## 2021-12-27 RX ADMIN — DOCUSATE SODIUM 100 MG: 100 CAPSULE ORAL at 21:25

## 2021-12-27 RX ADMIN — RANOLAZINE 1000 MG: 500 TABLET, FILM COATED, EXTENDED RELEASE ORAL at 21:24

## 2021-12-27 RX ADMIN — FUROSEMIDE 80 MG: 40 TABLET ORAL at 10:02

## 2021-12-27 RX ADMIN — FUROSEMIDE 80 MG: 40 TABLET ORAL at 16:30

## 2021-12-27 RX ADMIN — APIXABAN 5 MG: 5 TABLET, FILM COATED ORAL at 21:24

## 2021-12-27 RX ADMIN — TIZANIDINE 2 MG: 2 TABLET ORAL at 16:29

## 2021-12-27 RX ADMIN — TIZANIDINE 2 MG: 2 TABLET ORAL at 21:24

## 2021-12-27 RX ADMIN — INSULIN LISPRO 9 UNITS: 100 INJECTION, SOLUTION INTRAVENOUS; SUBCUTANEOUS at 18:30

## 2021-12-27 RX ADMIN — APIXABAN 5 MG: 5 TABLET, FILM COATED ORAL at 10:04

## 2021-12-27 RX ADMIN — BUSPIRONE HYDROCHLORIDE 7.5 MG: 5 TABLET ORAL at 10:03

## 2021-12-27 RX ADMIN — TIZANIDINE 2 MG: 2 TABLET ORAL at 10:03

## 2021-12-27 RX ADMIN — PANTOPRAZOLE SODIUM 40 MG: 40 TABLET, DELAYED RELEASE ORAL at 05:30

## 2021-12-27 RX ADMIN — GABAPENTIN 300 MG: 300 CAPSULE ORAL at 21:29

## 2021-12-27 RX ADMIN — BUSPIRONE HYDROCHLORIDE 7.5 MG: 5 TABLET ORAL at 16:30

## 2021-12-27 RX ADMIN — SODIUM CHLORIDE, PRESERVATIVE FREE 10 ML: 5 INJECTION INTRAVENOUS at 10:08

## 2021-12-27 RX ADMIN — BUSPIRONE HYDROCHLORIDE 7.5 MG: 5 TABLET ORAL at 21:25

## 2021-12-27 RX ADMIN — GABAPENTIN 300 MG: 300 CAPSULE ORAL at 10:04

## 2021-12-27 RX ADMIN — POLYETHYLENE GLYCOL 3350 17 G: 17 POWDER, FOR SOLUTION ORAL at 10:04

## 2021-12-27 ASSESSMENT — PAIN SCALES - GENERAL
PAINLEVEL_OUTOF10: 8
PAINLEVEL_OUTOF10: 3
PAINLEVEL_OUTOF10: 3

## 2021-12-27 NOTE — PROGRESS NOTES
Physical Therapy  [address] [facility]    Physical Therapy Progress Note    Date: 21  Patient Name: Montserrat Wheatley       Room:   MRN: [id number]   Account: [de-identified]   : 1960  (59 y.o.) Gender: female     Referring Practitioner: Nilam Aguilar MD  Diagnosis: Acute right-sided thoracic back pain  Past Medical History:  has a past medical history of Backache, unspecified, CHF (congestive heart failure) ([address] Chronic kidney disease, Coronary atherosclerosis of artery bypass graft, Cramp of limb, Gallstones, Hypertension, Insomnia, Pneumonia, Type II or unspecified type diabetes mellitus with renal manifestations, not stated as uncontrolled(250.40), Type II or unspecified type diabetes mellitus without mention of complication, not stated as uncontrolled, and Unspecified vitamin D deficiency. Past Surgical History:   has a past surgical history that includes Coronary artery bypass graft; Knee arthroscopy; Carpal tunnel release; Breast surgery; Tonsillectomy; Hand surgery; Ankle fracture surgery; Cholecystectomy, open (N/A); IR TUNNELED CVC PLACE WO SQ PORT/PUMP > 5 YEARS (2021); AV fistula creation (2021); and Dialysis fistula creation (Left, 2021). Restrictions/Precautions  Restrictions/Precautions: General Precautions; Fall Risk  Required Braces or Orthoses?: No    Subjective: Pt lying in bed, pleasant and agreeable to PT tx       Vital Signs  Patient Currently in Pain: Denies        Oxygen Therapy  SpO2: 92 %  Pulse Oximeter Device Mode: Intermittent  Pulse Oximeter Device Location: Finger  O2 Flow Rate (L/min): 2 L/min          Bed Mobility:   Bed Mobility  Supine to Sit: Minimal assistance (with HOB elevated to elevate torso completely)  Scooting: Stand by assistance;Contact guard assistance (to EOB)  Comment: Pt reported feeling dizzy/light headed upon initially sitting up. Symptoms subsided with time.   Bed mobility  Scooting: Stand by assistance;Contact guard assistance (to EOB)    Transfers:  Sit to Stand: Minimal Assistance (shaky legs, used mazin steady for safety)  Stand to sit: Contact guard assistance;Minimal Assistance      Stairs/Curb  Stairs?: No        BALANCE Posture: Fair  Sitting - Static: Fair (sitting EOB uisng UE's for support)  Standing - Static: Poor (mazin steady)    EXERCISES    Other exercises?: Yes  Other exercises 1: Supine (B) LE ex x 10-15 (tremors/shaky in (B) LE's with ex)  Other exercises 2: standing ilana in mazin steady- 30sec with heaving reliance on UE's and leaning fwd onto elbow to maintain semi stand position as she urinated  Other exercises 3: sit<>stand from paddles on mazin steady x 5           Activity Tolerance: Patient Tolerated treatment well,Patient limited by endurance  PT Equipment Recommendations  Equipment Needed: No       Current Treatment Recommendations: Strengthening,Balance Training,Functional Mobility Training,Transfer Training,Gait Training    Conditions Requiring Skilled Therapeutic Intervention  Body structures, Functions, Activity limitations: Decreased functional mobility ; Decreased strength; Increased pain  Assessment: Impaired mobility due to pain and safety issues of decreased balance and strength  Decision Making: Low Complexity  History: chronic back pain  Exam: decreased strength, balance; increased pain  Clinical Presentation: stable  REQUIRES PT FOLLOW UP: Yes  Discharge Recommendations: Home with assist PRN    Goals  Short term goals  Time Frame for Short term goals: 2-3 days  Short term goal 1: mod-I bed mobility  Short term goal 2: mod-I transfers  Short term goal 3: mod-I gait with RW x 50-100ft       12/27/21 1211   PT Individual Minutes   Time In 8322   Time Out 0911   Minutes 47       Electronically signed by Anthony Nye PTA on 12/27/21 at 12:24 PM EST

## 2021-12-27 NOTE — PLAN OF CARE
Problem: Falls - Risk of:  Goal: Will remain free from falls  Description: Will remain free from falls  Outcome: Ongoing  Goal: Absence of physical injury  Description: Absence of physical injury  Outcome: Ongoing     Problem: Pain:  Goal: Pain level will decrease  Description: Pain level will decrease  Outcome: Ongoing  Goal: Control of acute pain  Description: Control of acute pain  Outcome: Ongoing     Problem: Skin Integrity:  Goal: Will show no infection signs and symptoms  Description: Will show no infection signs and symptoms  Outcome: Ongoing  Goal: Absence of new skin breakdown  Description: Absence of new skin breakdown  Outcome: Ongoing     Problem: Pain:  Goal: Pain level will decrease  Description: Pain level will decrease  Outcome: Ongoing     Problem: Skin Integrity:  Goal: Will show no infection signs and symptoms  Description: Will show no infection signs and symptoms  Outcome: Ongoing

## 2021-12-27 NOTE — PROGRESS NOTES
Progress Note  Date:2021       Room:[facility]ly [facility]-  Patient Martin Kidd     YOB: 1960     Age:61 y.o. Subjective    Subjective:  Symptoms:  (States tremors are worse. Not observed tremoring. ). Diet:  Adequate intake. Activity level: Returning to normal.    Pain:  She reports no pain. Review of Systems  Objective         Vitals Last 24 Hours:  TEMPERATURE:  Temp  Av.3 °F (36.8 °C)  Min: 98.1 °F (36.7 °C)  Max: 98.4 °F (36.9 °C)  RESPIRATIONS RANGE: Resp  Avg: 15.3  Min: 14  Max: 16  PULSE OXIMETRY RANGE: SpO2  Av.3 %  Min: 93 %  Max: 94 %  PULSE RANGE: Pulse  Av  Min: 66  Max: 70  BLOOD PRESSURE RANGE: Systolic (54IWM), VLZ:327 , Min:100 , GWE:165   ; Diastolic (28UBN), NSS:19, Min:40, Max:60    I/O (24Hr): Intake/Output Summary (Last 24 hours) at 2021 0815  Last data filed at 2021 1726  Gross per 24 hour   Intake 715 ml   Output --   Net 715 ml     Objective:  General Appearance:  Comfortable. Vital signs: (most recent): Blood pressure 104/60, pulse 70, temperature 98.4 °F (36.9 °C), temperature source Oral, resp. rate 16, height 5' 5\" (1.651 m), weight 261 lb 0.4 oz (118.4 kg), SpO2 94 %. Vital signs are normal.    Heart: Normal rate. Regular rhythm. S1 normal and S2 normal.      Labs/Imaging/Diagnostics    Labs:  CBC:No results for input(s): WBC, RBC, HGB, HCT, MCV, RDW, PLT in the last 72 hours. CHEMISTRIES:  Recent Labs     21  0800 21  0633    135   K 5.1 4.8    98   CO2 24 23   BUN 26* 44*   CREATININE 2.85* 4.60*   GLUCOSE 210* 213*     PT/INR:No results for input(s): PROTIME, INR in the last 72 hours. APTT:No results for input(s): APTT in the last 72 hours. LIVER PROFILE:No results for input(s): AST, ALT, BILIDIR, BILITOT, ALKPHOS in the last 72 hours. Imaging Last 24 Hours:  No results found.   Assessment//Plan           Hospital Problems           Last Modified POA    * (Principal) Acute right-sided thoracic back pain 12/24/2021 Yes    Diabetic polyneuropathy associated with type 2 diabetes mellitus ([address] 12/24/2021 Yes    Spinal stenosis of lumbar region with neurogenic claudication 12/24/2021 Yes    Stage 4 chronic kidney disease ([address] 12/24/2021 Yes    Type 2 diabetes mellitus with kidney complication, with long-term current use of insulin ([address] 12/24/2021 Yes    Essential hypertension 12/24/2021 Yes    Ischemic stroke of frontal lobe ([address] 12/24/2021 Yes    Morbid obesity with BMI of 40.0-44.9, adult ([address] 12/24/2021 Yes    Generalized weakness 12/26/2021 Yes    Tremor 76/00/7472 Yes    Metabolic encephalopathy 87/24/9032 Yes        Assessment & Plan  Continue current management    MRIs as ordered by neurology    Electronically signed by Parviz Chu MD on 12/27/21 at 8:15 AM EST

## 2021-12-27 NOTE — PROGRESS NOTES
Pt has MRI's ordered. Please fill out a screening form with patient and fax to MRI @ [phone]. Thank you.

## 2021-12-27 NOTE — PROGRESS NOTES
[address]   OCCUPATIONAL THERAPY MISSED TREATMENT NOTE   INPATIENT   Date: 21  Patient Name: Emmett Escalante       Room: 5305/4975-09  MRN: [id number]   Account #: [de-identified]    : 1960  (59 y.o.)  Gender: female   Referring Practitioner: Basilia Blankenship MD  Diagnosis: Acute right-sided thoracic back pain     REASON FOR MISSED TREATMENT:  Patient at testing and/or off the floor   -   Hemodialysis. Will check back later as able.      [address], [place]/L

## 2021-12-27 NOTE — CONSULTS
Physical Medicine & Rehabilitation  Consult Note      Admitting Physician:  Reji Mccullough MD    Primary Care Provider:  AFSANEH Vyas CNP     Reason for Consult:  Acute Inpatient Rehabilitation    Chief Complaint:  Back pain    History of Present Illness:  Referring Provider is requesting an evaluation for appropriate placement upon discharge from acute care. History from chart review and patient. Raegan Reynoso is a 59 y.o. female with history of CVA, CHF, CAD, ESRD on HD, HTN, and type 2 diabetes admitted to [facility] on 12/23/2021. She initially presented with right upper thoracic pain and altered mental status. CT head showed no acute intracranial abnormality. Neurology was consulted, and MRI brain, cervical spine, and thoracic spine pending. She currently reports ongoing right-sided back pain but denies any pain in the bilateral lower limbs. She does note weakness in the bilateral lower limbs. She reports chronic numbness/tingling in the bilateral lower limbs without any acute change. She denies any changes in bowel or bladder control. Review of Systems:  Review of Systems   Constitutional: Negative for fever. HENT: Negative for hearing loss. Eyes: Negative for blurred vision and double vision. Respiratory: Positive for shortness of breath. Cardiovascular: Negative for chest pain. Gastrointestinal: Negative for abdominal pain. No change in bowel control   Genitourinary:        No change in bladder control   Musculoskeletal: Positive for back pain. Skin: Negative for rash. Neurological: Positive for sensory change (chronic) and weakness.       Premorbid function:  Independent    Current function:    PT:  Restrictions/Precautions: General Precautions,Fall Risk   Transfers  Sit to Stand: Minimal Assistance (shaky legs, used mazin steady for safety)  Stand to sit: Contact guard assistance,Minimal Assistance  Stand Pivot Transfers: Dependent/Total (mazin steady )  Comment: Once pt stood on mazin had urgency to urinate. Placed bed bu[place]rago under pt as she voided. Pt trasfered into [facility] using mazin steady on this date. Legs continue to be shaky/weak. Ambulation 1  Surface: level tile  Device: Rolling Walker  Assistance: Contact guard assistance  Distance: 3 lateral side steps to left towards [facility]  Comments: Pt c/o symptoms of fatigue and dizziness while standing prior to ambulating. Pt needing to sit initially before taking steps. 2nd Attempt, pt able to take 3 side steps towards [facility] to assist with pt positioning to return to bed    Transfers  Sit to Stand: Minimal Assistance (shaky legs, used mazin steady for safety)  Stand to sit: Contact guard assistance,Minimal Assistance  Stand Pivot Transfers: Dependent/Total (mazin steady )  Comment: Once pt stood on mazin had urgency to urinate. Placed bed buitrago under pt as she voided. Pt trasfered into [facility] using mazin steady on this date. Legs continue to be shaky/weak. Ambulation  Ambulation?: No (not safe/ready at this time)  Ambulation 1  Surface: level tile  Device: Rolling Walker  Assistance: Contact guard assistance  Distance: 3 lateral side steps to left towards [facility]  Comments: Pt c/o symptoms of fatigue and dizziness while standing prior to ambulating. Pt needing to sit initially before taking steps. 2nd Attempt, pt able to take 3 side steps towards [facility] to assist with pt positioning to return to bed    Surface: level tile  Ambulation 1  Surface: level tile  Device: Rolling Walker  Assistance: Contact guard assistance  Distance: 3 lateral side steps to left towards [facility]  Comments: Pt c/o symptoms of fatigue and dizziness while standing prior to ambulating. Pt needing to sit initially before taking steps.  2nd Attempt, pt able to take 3 side steps towards [facility] to assist with pt positioning to return to bed      OT:   ADL  Feeding: Adaptive utensils (comment) (provided built up handles for utensils to improve grasp/cont)  Grooming: Setup (built up handle provided for toothbrush)  UE Bathing: Minimal assistance (clinical reasoning based on ROM/control of UE's)  LE Bathing: Maximum assistance (clinical reasoning this date. )  UE Dressing: Minimal assistance (to manage gown. )  LE Dressing: Maximum assistance (clinical reasoning this date. )  Toileting: None (pt incontinent of urine )  Additional Comments: Attempted to complete shower this date with PCT assist to ensure pt safety and improve pt functional activity engagement/tolerance. Shower Ok'd by [place]. However pt unsuccessful with transfer to shower, bathing completed while seated in chair. Balance  Sitting Balance: Minimal assistance (SBA initially, min A due to posterior LOB. )  Standing Balance: Minimal assistance (Assist x2 )   Standing Balance  Time: ~1 min   Activity: standing EOB, x1 step with RW towards bathroom. Comment: BLE's buckled suddenly; pt describes as tremors in both legs, stating RLE went out first. PCT and writer both assisted in control sit to floor. DEDE Molina notified, additional PCT's arrived to assist pt off floor and sit in arm chair. Functional Mobility  Functional - Mobility Device: Rolling Walker  Activity:  (Side steps near EOB)  Assist Level: Contact guard assistance  Functional Mobility Comments: Attempted to ambulate in patient's room; however, she reported feeling significantly lightheaded upon standing. Pt stated, \"Can we not walk today? \" and sat EOB. Bed mobility  Supine to Sit: Contact guard assistance  Sit to Supine: Minimal assistance  Scooting: Stand by assistance,Contact guard assistance (to EOB)  Comment: HOB elevated, increased time. Pt reports some dizziness upon sitting EOB however reports diminishes with simple breathing techniques. Pt with x1 posterior LOB with min A for recovery.    Transfers  Sit to stand: 2 Person assistance,Contact guard assistance  Stand to sit: 2 Person assistance,Moderate assistance,Maximum assistance (lowered to floor. )  Transfer Comments: Pt emotional and tearful with outcome of standing/stepping with RW.  (emotional support provided. )                 SLP:      Past Medical History:        Diagnosis Date    Backache, unspecified     CHF (congestive heart failure) (HCC)     Chronic kidney disease     Coronary atherosclerosis of artery bypass graft     Cramp of limb     Gallstones     Hypertension     Insomnia     Pneumonia     Type II or unspecified type diabetes mellitus with renal manifestations, not stated as uncontrolled(250.40)     Type II or unspecified type diabetes mellitus without mention of complication, not stated as uncontrolled     Unspecified vitamin D deficiency        Past Surgical History:        Procedure Laterality Date    ANKLE FRACTURE SURGERY      AV FISTULA CREATION  12/14/2021    BREAST SURGERY      CARPAL TUNNEL RELEASE      CHOLECYSTECTOMY, OPEN N/A     CORONARY ARTERY BYPASS GRAFT      x3    DIALYSIS FISTULA CREATION Left 12/14/2021    LEFT AV FISTULA CREATION UPPER EXTREMITY performed by Shashank Campuzano MD at [facility] IR TUNNELED CATHETER PLACEMENT GREATER THAN 5 YEARS  8/18/2021    IR TUNNELED CATHETER PLACEMENT GREATER THAN 5 YEARS 8/18/2021 [facility] SPECIAL PROCEDURES    KNEE ARTHROSCOPY      right    TONSILLECTOMY         Allergies:     Allergies   Allergen Reactions    Adhesive Tape Other (See Comments) and Rash     Other reaction(s): Unknown    Ace Inhibitors Other (See Comments)     cough    Iv Dye [Iodides]      Stage 4 kidney disease    Metformin And Related Hives, Itching and Rash        Current Medications:   Current Facility-Administered Medications: tiZANidine (ZANAFLEX) tablet 4 mg, 4 mg, Oral, Q6H PRN  tiZANidine (ZANAFLEX) tablet 2 mg, 2 mg, Oral, TID  lidocaine 4 % external patch 1 patch, 1 patch, TransDERmal, Daily  aspirin chewable tablet 81 mg, 81 mg, Oral, Daily  atorvastatin (LIPITOR) tablet 80 mg, 80 mg, Oral, PRN  sodium citrate 4 % injection 1.9 mL, 1.9 mL, IntraCATHeter, PRN    Family History:       Problem Relation Age of Onset    Diabetes Father     Heart Failure Father        Social History:  Lives With: Other (comment) (mother)  Type of Home: House (condo)  Home Layout: One level  Home Access: Ramped entrance  Bathroom Shower/Tub: Walk-in shower  Bathroom Toilet: Handicap height  Bathroom Equipment: Grab bars in [place]: 4 wheeled [facility] [facility] [address]: [address] [facility] [address]: Independent (wheeled walker for short distances)  Transfer Assistance: Independent  Active : No  Social History     Socioeconomic History    Marital status: Single     Spouse name: Not on file    Number of children: Not on file    Years of education: Not on file    Highest education level: Not on file   Occupational History    Not on file   Tobacco Use    Smoking status: Never Smoker    Smokeless tobacco: Never Used   Vaping Use    Vaping Use: Never used   Substance and Sexual Activity    Alcohol use: No    Drug use: No    Sexual activity: Not Currently   Other Topics Concern    Not on file   Social History Narrative    Not on file     Social Determinants of Health     Financial Resource Strain: Low Risk     Difficulty of Paying Living Expenses: Not hard at all   Food Insecurity:     Worried About [facility] in the Last Year: Not on file    [facility] in the Last Year: Not on file   Transportation Needs:     Lack of Transportation (Medical): Not on file    Lack of Transportation (Non-Medical):  Not on file   Physical Activity:     Days of Exercise per Week: Not on file    Minutes of Exercise per Session: Not on file   Stress:     Feeling of Stress : Not on file   Social Connections:     Frequency of Communication with Friends and Family: Not on file    Frequency of Social Gatherings with Friends and Family: Not on file   [address] [place] [address] Attends Sikh Services: Not on file    Active Member of Clubs or Organizations: Not on file    Attends Club or Organization Meetings: Not on file    Marital Status: Not on file   Intimate Partner Violence:     Fear of Current or Ex-Partner: Not on file    Emotionally Abused: Not on file    Physically Abused: Not on file    Sexually Abused: Not on file   Housing Stability:     Unable to Pay for Housing in the Last Year: Not on file    Number of [place] in the Last Year: Not on file    Unstable Housing in the Last Year: Not on file       Physical Exam:  BP (!) 105/48   Pulse 63   Temp 98.1 °F (36.7 °C) (Oral)   Resp 18   Ht 5' 5\" (1.651 m)   Wt 261 lb 0.4 oz (118.4 kg)   SpO2 94%   BMI 43.44 kg/m²     GEN: Well developed, well nourished, no acute distress  HEENT: NCAT. EOM grossly intact. Hearing grossly intact. Mucous membranes pink and moist.  RESP: Normal breath sounds with no wheezing, rales, or rhonchi. Respirations WNL and unlabored. On nasal cannula oxygen. CV: Regular rate and rhythm. No murmurs, rubs, or gallops. ABD: Soft, non-distended, BS+ and equal.  NEURO: Alert but appears tired. Speech fluent. Sensation to light touch decreased in distal right lower limb. MSK:  Muscle tone and bulk are normal bilaterally. Strength 4/5 in bilateral upper limbs. Strength 4/5 with bilateral ankle dorsiflexion and plantarflexion. LIMBS: Edema present in bilateral lower limbs. SKIN: Warm and dry with good turgor. PSYCH: Mood WNL. Affect WNL. Appropriately interactive. Diagnostics:    CBC: No results for input(s): WBC, RBC, HGB, HCT, MCV, RDW, PLT in the last 72 hours.   BMP:   Recent Labs     12/25/21  0800 12/26/21  0633 12/27/21  1003    135 133*   K 5.1 4.8 5.0    98 96*   CO2 24 23 23   BUN 26* 44* 58*   CREATININE 2.85* 4.60* 5.16*   GLUCOSE 210* 213* 266*      HbA1c:   Lab Results   Component Value Date    LABA1C 7.6 (H) 12/24/2021     BNP: No results for input(s): BNP in the last 72 hours. PT/INR: No results for input(s): PROTIME, INR in the last 72 hours. APTT: No results for input(s): APTT in the last 72 hours. CARDIAC ENZYMES: No results for input(s): CKMB, CKMBINDEX, TROPONINT in the last 72 hours. Invalid input(s): CKTOTAL;3  FASTING LIPID PANEL:  Lab Results   Component Value Date    CHOL 105 04/09/2015    HDL 43 04/09/2015    TRIG 168 04/09/2015     LIVER PROFILE: No results for input(s): AST, ALT, ALB, BILIDIR, BILITOT, ALKPHOS in the last 72 hours. Radiology:  CT HEAD WO CONTRAST   Final Result   No acute intracranial abnormality. RECOMMENDATIONS:   Unavailable         XR FOOT LEFT (MIN 3 VIEWS)   Final Result   Soft tissue swelling dorsally most pronounced over the forefoot. No acute osseous abnormality identified however it would be impossible to   exclude osteomyelitis at the heads of the 4th and 5th metatarsals due to   marked juxta-articular osteoporosis at the 2nd through 5th MTP joints   superimposed on diffuse osteoporosis. XR FOOT RIGHT (MIN 3 VIEWS)   Final Result   Soft tissue edema which is nonspecific and could be reactive. Cellulitis   could produce a similar appearance. Extensive chronic osseous changes. No definite acute bony abnormality. MRI   would be useful if symptoms persist.         XR CHEST PORTABLE   Final Result   Stable cardiomegaly with minimal central pulmonary congestion or pulmonary   artery hypertension which is less prominent. Chronic obstructive lung changes with mild bibasilar linear atelectasis or   scarring which is less prominent. No change in the right-sided dialysis catheter. MRI BRAIN WO CONTRAST    (Results Pending)   MRI CERVICAL SPINE WO CONTRAST    (Results Pending)   MRI THORACIC SPINE WO CONTRAST    (Results Pending)         Impression:    1. Acute right-sided thoracic back pain  2. Altered mental status  3. ESRD on HD  4.  Recent left upper limb fistula creation  5. CHF  6. CAD  7. HTN  8. Type 2 diabetes  9. History of CVA  10. Morbid obesity    Recommendations:    1. Diagnosis:  Acute thoracic back pain, altered mental status  2. Therapy: Has PT/OT needs  3. Medical Necessity: As above  4. Support: Lives with mother, sisters are supportive  5. Rehab Recommendation:  The patient may benefit from acute inpatient rehabilitation once medically stable per primary service. Will follow progress in therapies. - MRI brain, cervical spine, and thoracic spine pending    6. DVT Prophylaxis: On eliquis    It was my pleasure to evaluate Montserrat Wheatley today. Please call with questions.     Bc Walton MD

## 2021-12-27 NOTE — FLOWSHEET NOTE
12/27/21 1450   Encounter Summary   Services provided to: Patient   Referral/Consult From: Nhi   Continue Visiting   (12/27/21 V)   Complexity of Encounter Low   Length of Encounter 15 minutes   Spiritual/Latter day   Type Spiritual support   Intervention Prayer

## 2021-12-27 NOTE — PROGRESS NOTES
Called the floor regarding times for Pt's multiple MRIs. Called at 2pm, pt still in dialysis and nurse unable to get pt. Not sure if MRIs will be completed today due to several ordered and needing a large block of time to complete them. Any questions, call MRI department [phone]. Thank You.

## 2021-12-27 NOTE — PLAN OF CARE
Problem: Falls - Risk of:  Goal: Will remain free from falls  Description: Will remain free from falls  Outcome: Ongoing    Patient remained free of falls. Call light within reach, side rails up x2, bedside table in reach. Room free of clutter. Bed alarm on.      Problem: Pain:  Goal: Pain level will decrease  Description: Pain level will decrease  Outcome: Ongoing    Problem: Skin Integrity:  Goal: Will show no infection signs and symptoms  Description: Will show no infection signs and symptoms  Outcome: Ongoing

## 2021-12-27 NOTE — PROGRESS NOTES
Dialysis Post Treatment Report:     -Access Assessment  wnl good BFR     -Ultrafiltration   UF Goal 1900   Prime (-) 400   NS Flush (-)    Other (-/+)    Total UF Removed 1500     -Medications / Blood Administration  Medications Given (Y/N) n   Blood Products (Y/N) n     Narrative:  Pt tolerated hd well no issues noted

## 2021-12-27 NOTE — PROGRESS NOTES
[facility]    INPATIENT OCCUPATIONAL THERAPY  PROGRESS NOTE  Date: 2021  Patient Name: Soila Cintron      Room: 5152/3710-06  MRN: [id number]    : 1960  (62 y.o.) Gender: female     Discharge Recommendations:  Further Occupational Therapy is recommended upon facility discharge. Equipment Needed: No    Referring Practitioner: Jose Silverman MD  Diagnosis: Acute right-sided thoracic back pain  General  Chart Reviewed: Yes  Patient assessed for rehabilitation services?: Yes  Additional Pertinent Hx: 59 y.o. female with past medical history significant for end-stage renal disease on dialysis, CAD, diabetes mellitus via EMS with reports of back pain. Patient is alert although does not seem to be answering questions appropriately, history is unreliable with multiple stories being reported by patient. Patient reports back pain right-sided with no injury initially and then reports that she was picking something up and did have pain of the right side which happened a week ago. Patient does state that she has had prior incidence of back spasms and back pain. Response to previous treatment: Patient with no complaints from previous session  Family / Caregiver Present: No  Referring Practitioner: Jose Silverman MD  Diagnosis: Acute right-sided thoracic back pain    Restrictions  Restrictions/Precautions: General Precautions,Fall Risk  Required Braces or Orthoses?: No      Subjective  Comments: Pt fatigued and reports some dizziness however agreeable to ex's in bed this date. Pt recently returned from dialysis. Patient Currently in Pain: Denies  Overall Orientation Status: Impaired  Orientation Level: Oriented to place;Oriented to time;Oriented to person;Disoriented to situation          Objective  Cognition  Overall Cognitive Status: Impaired  Arousal/Alertness: Delayed responses to stimuli  Following Directions:  Follows one step commands  Attention Span: Attends with cues to redirect  Memory: Decreased recall of recent events     Type of ROM/Therapeutic Exercise  Type of ROM/Therapeutic Exercise: Free weights  Comment: Pt engaged in BUE ex's while in bed to promote increased strengthening and endurance. Pt compeltes each exercise to tolerance; 10-15 reps, 1/2# weight. Exercises  Scapular Protraction: x  Scapular Retraction: x  Shoulder Flexion: x  Shoulder Extension: x  Elbow Flexion: x  Elbow Extension: x  Wrist Flexion: x  Wrist Extension: x  Grasp/Release: 2nd setting spring hand gripper x15 reps B hands. Additional Activities: Other  Additional Activities: 1\"x1\" cube stacking to address coordination/control of BUE's and visual perceptual skills. Tremors noted to be decreased since previous date. RUE: x9/x5, LUE: x7/x4. (decreased tolerance with second round due to fatigue. )       Assessment  Performance deficits / Impairments: Decreased functional mobility ; Decreased ADL status; Decreased strength;Decreased safe awareness;Decreased endurance;Decreased sensation;Decreased balance  Prognosis: Good  Discharge Recommendations: Continue to assess pending progress  Activity Tolerance: Patient limited by fatigue  Safety Devices in place: Yes  Type of devices: Left in bed;Call light within reach; All fall risk precautions in place             Plan  Safety Devices  Safety Devices in place: Yes  Type of devices: Left in bed,Call light within reach,All fall risk precautions in place  Plan  Times per week: 4-5  Times per day: Daily  Current Treatment Recommendations: Strengthening,ROM,Balance Training,Functional Mobility Jose Alfredo Ren Education & Training,Patient/Caregiver Education & Training,Equipment Evaluation, Education, & procurement,Self-Care / ADL      Goals  Short term goals  Time Frame for Short term goals: By discharge  Short term goal 1: Patient will perform BADLs with mod I and good safety  Short term goal 2: Patient will tolerate standing 4+ minutes, with no LOB, mod I and good safety  Short term goal 3: Patient will perform transfers/functional mobility with mod I   Short term goal 4: Patient will V/D at least 3 EC/WS/fall prevention strategies to promote safety with daily routine  Short term goal 5: Patient will actively participate in 15+ minutes of therapeutic activity to promote independence with self care and mobility    OT Individual Minutes  Time In: 1523  Time Out: [facility]  Minutes: 28    Electronically signed by DAYANA Poole on 12/27/21 at 3:55 PM EST

## 2021-12-27 NOTE — CARE COORDINATION
ONGOING DISCHARGE PLAN:    Patient is alert and oriented x4. Spoke with patient regarding discharge plan. Notified pt that PT is recommending ARU VS. SNF. She states she would like to go to ARU again. Asked if she has a SNF in mind for her second option, and she does not. Left perfect serve message for Dr. Jane Duran to obtain PM&R consult. Pt to have MRI Brain, Cervical Spine, and Thoracic Spine. Goes to HD at [facility] MWF @ 1pm.    Will continue to follow for additional discharge needs. Electronically signed by Yusuf Patton RN on 12/27/2021 at 1:19 PM    PM&R consult placed. Notified Marquis Sampson, that pt would like ARU.     Electronically signed by Yusuf Patton RN on 12/27/2021 at 1:53 PM

## 2021-12-27 NOTE — PROGRESS NOTES
Department of Internal Medicine  Nephrology Radha Zuñiga MD   Progress Note    Reason for consultation: Management of hemodialysis dependent end-stage renal disease. Consulting physician: Irene Lynne MD    Interval history: Patient was seen and examined today during dialysis and she is more awake and responsive. She is not in acute respiratory distress. Blood pressure stable    History of present illness: This is a 59 y.o. female with a significant past medical history of Chronic atrial fibrillation [on Eliquis], Type 2 diabetes mellitus, essential systemic hypertension, coronary artery disease [s/p CABG in 2004 and PTCA in 2019], heart failure with preserved ejection fraction [LVEF 55%] and end-stage renal disease secondary to diabetic and hypertensive nephropathy [on routine hemodialysis on a Monday/Wednesday/Friday schedule at [address] [place] [address] dialysis unit on [facility] under the care of Dr. Marsha Lewis since August 2001 using IJ tunneled dialysis catheter], who presented to the hospital via EMS for evaluation of worsening low back pain rated 10/10. She was seen and examined this morning and she remains quite lethargic and stuporous and difficult to arouse. She however is not in acute respiratory distress.     Scheduled Meds:   tiZANidine  2 mg Oral TID    lidocaine  1 patch TransDERmal Daily    aspirin  81 mg Oral Daily    atorvastatin  80 mg Oral Daily    busPIRone  7.5 mg Oral TID    carvedilol  6.25 mg Oral BID    docusate sodium  100 mg Oral BID    apixaban  5 mg Oral BID    febuxostat  40 mg Oral Daily    furosemide  80 mg Oral BID    gabapentin  300 mg Oral BID    insulin glargine  73 Units SubCUTAneous BID    pantoprazole  40 mg Oral QAM AC    polyethylene glycol  17 g Oral Daily    ranolazine  1,000 mg Oral BID    tamsulosin  0.4 mg Oral Daily    traZODone  75 mg Oral Nightly    sodium chloride flush  5-40 mL IntraVENous 2 times per day    insulin lispro  0-18 Units SubCUTAneous TID WC    insulin lispro  0-9 Units SubCUTAneous Nightly     Continuous Infusions:   dextrose      sodium chloride       PRN Meds:.tiZANidine, senna, glucose, dextrose, glucagon (rDNA), dextrose, sodium chloride flush, sodium chloride, ondansetron **OR** ondansetron, polyethylene glycol, acetaminophen **OR** acetaminophen, HYDROcodone 5 mg - acetaminophen, sodium citrate, sodium citrate    Physical Exam:    VITALS:  BP (!) 115/48   Pulse 68   Temp 98.4 °F (36.9 °C) (Oral)   Resp 18   Ht 5' 5\" (1.651 m)   Wt 261 lb 0.4 oz (118.4 kg)   SpO2 94%   BMI 43.44 kg/m²   24HR INTAKE/OUTPUT:      Intake/Output Summary (Last 24 hours) at 12/27/2021 1510  Last data filed at 12/27/2021 0745  Gross per 24 hour   Intake 715 ml   Output --   Net 715 ml       Constitutional: slowed mentation and Lethargic and difficult to arouse    Skin: Skin color, texture, turgor normal. No rashes or lesions    Head: Normocephalic, without obvious abnormality, atraumatic     Cardiovascular/Edema: regular rate and rhythm, S1, S2 normal, no murmur, click, rub or gallop    Respiratory: Lungs: clear to auscultation bilaterally    Abdomen: soft, non-tender; bowel sounds normal; no masses,  no organomegaly    Back: symmetric, no curvature. ROM normal. No CVA tenderness. Extremities: edema +    Neuro:  Stuporous and lethargic. CBC:   No results for input(s): WBC, HGB, PLT in the last 72 hours.   BMP:    Recent Labs     12/25/21  0800 12/26/21  0633 12/27/21  1003    135 133*   K 5.1 4.8 5.0    98 96*   CO2 24 23 23   BUN 26* 44* 58*   CREATININE 2.85* 4.60* 5.16*   GLUCOSE 210* 213* 266*     Lab Results   Component Value Date    NITRU NEGATIVE 11/14/2021    COLORU Yellow 11/14/2021    PHUR 5.0 11/14/2021    WBCUA 5 TO 10 11/14/2021    RBCUA 0 TO 2 11/14/2021    MUCUS NOT REPORTED 11/14/2021    TRICHOMONAS NOT REPORTED 11/14/2021    YEAST FEW 11/14/2021    BACTERIA MANY 11/14/2021    SPECGRAV 1.014 11/14/2021 LEUKOCYTESUR TRACE 11/14/2021    UROBILINOGEN Normal 11/14/2021    BILIRUBINUR NEGATIVE 11/14/2021    GLUCOSEU 3+ 11/14/2021    KETUA NEGATIVE 11/14/2021    AMORPHOUS NOT REPORTED 11/14/2021     Urine Sodium:     Lab Results   Component Value Date    JASON 47 11/14/2021     Urine Chloride:    Lab Results   Component Value Date    CLUR 26 11/14/2021     Urine Protein:     Lab Results   Component Value Date    TPU 20 11/12/2021     Urine Creatinine:     Lab Results   Component Value Date    LABCREA 72.0 11/14/2021     IMPRESSION/RECOMMENDATIONS:      1.  End-stage renal disease - we will maintain MWF hemodialysis schedule. Vascular access is IJ tunneled hemodialysis catheter. Renal diet,i.e 2-gram sodium,2-gram potassium,1500 ml fluid restriction,1-gram phosphorus, 1800 KCal and 1.2 gram protein per day. 2.  Altered mental status - rule out narcotic analgesia overdosage. blood glucose is within normal range. Sepsis will be ruled out also. Mental status is back to baseline. 3.   Normocytic anemia of chronic kidney disease - hemoglobin 10.4 g/dL is within target range. 4.  Systemic hypertension - blood pressure control is adequate. Plan    Continue hemodialysis Monday Wednesday Friday hemodialysis today as per orders      Prognosis is guarded.     Marco Antonio Barajas MD   Attending Nephrologist  12/27/2021 3:10 PM

## 2021-12-28 ENCOUNTER — APPOINTMENT (OUTPATIENT)
Dept: MRI IMAGING | Age: 61
End: 2021-12-28
Payer: COMMERCIAL

## 2021-12-28 ENCOUNTER — HOSPITAL ENCOUNTER (OUTPATIENT)
Dept: PHYSICAL THERAPY | Age: 61
Setting detail: THERAPIES SERIES
End: 2021-12-28
Payer: COMMERCIAL

## 2021-12-28 ENCOUNTER — CLINICAL DOCUMENTATION (OUTPATIENT)
Dept: OCCUPATIONAL THERAPY | Age: 61
End: 2021-12-28

## 2021-12-28 LAB
ANION GAP SERPL CALCULATED.3IONS-SCNC: 13 MMOL/L (ref 9–17)
BUN BLDV-MCNC: 36 MG/DL (ref 8–23)
BUN/CREAT BLD: ABNORMAL (ref 9–20)
CALCIUM SERPL-MCNC: 9.3 MG/DL (ref 8.6–10.4)
CHLORIDE BLD-SCNC: 97 MMOL/L (ref 98–107)
CO2: 26 MMOL/L (ref 20–31)
CREAT SERPL-MCNC: 3.93 MG/DL (ref 0.5–0.9)
GFR AFRICAN AMERICAN: 14 ML/MIN
GFR NON-AFRICAN AMERICAN: 12 ML/MIN
GFR SERPL CREATININE-BSD FRML MDRD: ABNORMAL ML/MIN/{1.73_M2}
GFR SERPL CREATININE-BSD FRML MDRD: ABNORMAL ML/MIN/{1.73_M2}
GLUCOSE BLD-MCNC: 108 MG/DL (ref 70–99)
GLUCOSE BLD-MCNC: 210 MG/DL (ref 65–105)
GLUCOSE BLD-MCNC: 211 MG/DL (ref 65–105)
GLUCOSE BLD-MCNC: 240 MG/DL (ref 65–105)
GLUCOSE BLD-MCNC: 260 MG/DL (ref 65–105)
GLUCOSE BLD-MCNC: 314 MG/DL (ref 65–105)
GLUCOSE BLD-MCNC: 99 MG/DL (ref 65–105)
POTASSIUM SERPL-SCNC: 4.4 MMOL/L (ref 3.7–5.3)
SODIUM BLD-SCNC: 136 MMOL/L (ref 135–144)

## 2021-12-28 PROCEDURE — 80048 BASIC METABOLIC PNL TOTAL CA: CPT

## 2021-12-28 PROCEDURE — 70551 MRI BRAIN STEM W/O DYE: CPT

## 2021-12-28 PROCEDURE — 6370000000 HC RX 637 (ALT 250 FOR IP): Performed by: STUDENT IN AN ORGANIZED HEALTH CARE EDUCATION/TRAINING PROGRAM

## 2021-12-28 PROCEDURE — 72141 MRI NECK SPINE W/O DYE: CPT

## 2021-12-28 PROCEDURE — 99232 SBSQ HOSP IP/OBS MODERATE 35: CPT | Performed by: FAMILY MEDICINE

## 2021-12-28 PROCEDURE — 97110 THERAPEUTIC EXERCISES: CPT

## 2021-12-28 PROCEDURE — 72146 MRI CHEST SPINE W/O DYE: CPT

## 2021-12-28 PROCEDURE — 6370000000 HC RX 637 (ALT 250 FOR IP): Performed by: PSYCHIATRY & NEUROLOGY

## 2021-12-28 PROCEDURE — 93005 ELECTROCARDIOGRAM TRACING: CPT | Performed by: EMERGENCY MEDICINE

## 2021-12-28 PROCEDURE — G0378 HOSPITAL OBSERVATION PER HR: HCPCS

## 2021-12-28 PROCEDURE — 97116 GAIT TRAINING THERAPY: CPT

## 2021-12-28 PROCEDURE — 2580000003 HC RX 258: Performed by: FAMILY MEDICINE

## 2021-12-28 PROCEDURE — 6370000000 HC RX 637 (ALT 250 FOR IP): Performed by: FAMILY MEDICINE

## 2021-12-28 PROCEDURE — 36415 COLL VENOUS BLD VENIPUNCTURE: CPT

## 2021-12-28 RX ORDER — TIZANIDINE 4 MG/1
4 TABLET ORAL EVERY 6 HOURS PRN
Qty: 20 TABLET | Refills: 0
Start: 2021-12-28 | End: 2022-02-15 | Stop reason: SDUPTHER

## 2021-12-28 RX ADMIN — TIZANIDINE 2 MG: 2 TABLET ORAL at 13:50

## 2021-12-28 RX ADMIN — FEBUXOSTAT 40 MG: 40 TABLET, FILM COATED ORAL at 09:22

## 2021-12-28 RX ADMIN — PANTOPRAZOLE SODIUM 40 MG: 40 TABLET, DELAYED RELEASE ORAL at 06:28

## 2021-12-28 RX ADMIN — TIZANIDINE 2 MG: 2 TABLET ORAL at 21:36

## 2021-12-28 RX ADMIN — GABAPENTIN 300 MG: 300 CAPSULE ORAL at 09:21

## 2021-12-28 RX ADMIN — CARVEDILOL 6.25 MG: 6.25 TABLET, FILM COATED ORAL at 21:36

## 2021-12-28 RX ADMIN — BUSPIRONE HYDROCHLORIDE 7.5 MG: 5 TABLET ORAL at 13:50

## 2021-12-28 RX ADMIN — RANOLAZINE 1000 MG: 500 TABLET, FILM COATED, EXTENDED RELEASE ORAL at 09:20

## 2021-12-28 RX ADMIN — TIZANIDINE 2 MG: 2 TABLET ORAL at 09:20

## 2021-12-28 RX ADMIN — SODIUM CHLORIDE, PRESERVATIVE FREE 10 ML: 5 INJECTION INTRAVENOUS at 21:47

## 2021-12-28 RX ADMIN — ATORVASTATIN CALCIUM 80 MG: 80 TABLET, FILM COATED ORAL at 09:21

## 2021-12-28 RX ADMIN — CARVEDILOL 6.25 MG: 6.25 TABLET, FILM COATED ORAL at 09:21

## 2021-12-28 RX ADMIN — DOCUSATE SODIUM 100 MG: 100 CAPSULE ORAL at 21:35

## 2021-12-28 RX ADMIN — INSULIN GLARGINE 73 UNITS: 100 INJECTION, SOLUTION SUBCUTANEOUS at 21:37

## 2021-12-28 RX ADMIN — APIXABAN 5 MG: 5 TABLET, FILM COATED ORAL at 21:36

## 2021-12-28 RX ADMIN — BUSPIRONE HYDROCHLORIDE 7.5 MG: 5 TABLET ORAL at 09:20

## 2021-12-28 RX ADMIN — SODIUM CHLORIDE, PRESERVATIVE FREE 10 ML: 5 INJECTION INTRAVENOUS at 09:22

## 2021-12-28 RX ADMIN — FUROSEMIDE 80 MG: 40 TABLET ORAL at 16:41

## 2021-12-28 RX ADMIN — TAMSULOSIN HYDROCHLORIDE 0.4 MG: 0.4 CAPSULE ORAL at 09:20

## 2021-12-28 RX ADMIN — INSULIN LISPRO 6 UNITS: 100 INJECTION, SOLUTION INTRAVENOUS; SUBCUTANEOUS at 11:28

## 2021-12-28 RX ADMIN — GABAPENTIN 300 MG: 300 CAPSULE ORAL at 21:47

## 2021-12-28 RX ADMIN — INSULIN LISPRO 6 UNITS: 100 INJECTION, SOLUTION INTRAVENOUS; SUBCUTANEOUS at 16:41

## 2021-12-28 RX ADMIN — TRAZODONE HYDROCHLORIDE 75 MG: 50 TABLET ORAL at 21:35

## 2021-12-28 RX ADMIN — DOCUSATE SODIUM 100 MG: 100 CAPSULE ORAL at 09:21

## 2021-12-28 RX ADMIN — RANOLAZINE 1000 MG: 500 TABLET, FILM COATED, EXTENDED RELEASE ORAL at 21:35

## 2021-12-28 RX ADMIN — BUSPIRONE HYDROCHLORIDE 7.5 MG: 5 TABLET ORAL at 21:36

## 2021-12-28 RX ADMIN — FUROSEMIDE 80 MG: 40 TABLET ORAL at 09:20

## 2021-12-28 RX ADMIN — INSULIN LISPRO 5 UNITS: 100 INJECTION, SOLUTION INTRAVENOUS; SUBCUTANEOUS at 21:36

## 2021-12-28 RX ADMIN — ASPIRIN 81 MG: 81 TABLET, CHEWABLE ORAL at 09:20

## 2021-12-28 ASSESSMENT — ENCOUNTER SYMPTOMS
SHORTNESS OF BREATH: 1
BLURRED VISION: 0
BACK PAIN: 1
DOUBLE VISION: 0
ABDOMINAL PAIN: 0

## 2021-12-28 ASSESSMENT — PAIN DESCRIPTION - LOCATION: LOCATION: BACK

## 2021-12-28 ASSESSMENT — PAIN SCALES - GENERAL: PAINLEVEL_OUTOF10: 5

## 2021-12-28 ASSESSMENT — PAIN DESCRIPTION - ORIENTATION: ORIENTATION: MID;LOWER

## 2021-12-28 ASSESSMENT — PAIN DESCRIPTION - PAIN TYPE: TYPE: CHRONIC PAIN

## 2021-12-28 NOTE — PROGRESS NOTES
Notified Re[facility] Overall, that per Dr Johnson Lose pt may benefit from ARU pending MRI's. ARU will follow.

## 2021-12-28 NOTE — PROGRESS NOTES
[address]   INPATIENT OCCUPATIONAL THERAPY  PROGRESS NOTE  Date: 2021  Patient Name: Torrie Cheung      Room: 9641/1777-59  MRN: [id number]    : 1960  (62 y.o.) Gender: female     Discharge Recommendations:  Further Occupational Therapy is recommended upon facility discharge. Equipment Needed: No    Referring Practitioner: Catalina Zhu MD  Diagnosis: Acute right-sided thoracic back pain  General  Chart Reviewed: Yes  Patient assessed for rehabilitation services?: Yes  Additional Pertinent Hx: 59 y.o. female with past medical history significant for end-stage renal disease on dialysis, CAD, diabetes mellitus via EMS with reports of back pain. Patient is alert although does not seem to be answering questions appropriately, history is unreliable with multiple stories being reported by patient. Patient reports back pain right-sided with no injury initially and then reports that she was picking something up and did have pain of the right side which happened a week ago. Patient does state that she has had prior incidence of back spasms and back pain. Response to previous treatment: Patient with no complaints from previous session  Family / Caregiver Present: No  Referring Practitioner: Catalina Zhu MD  Diagnosis: Acute right-sided thoracic back pain    Restrictions  Restrictions/Precautions: General Precautions,Fall Risk  Required Braces or Orthoses?: No      Subjective  Comments: Pt pleasant and cooperative, motivated to work with therapy today.  Pt just got finished bathing with PCT assist upon arrival.    Patient Currently in Pain: Yes  Pain Level: 5  Pain Location: Back  Pain Orientation: Mid;Lower  Overall Orientation Status: Within Functional Limits     Pain Assessment  Pain Assessment: 0-10  Pain Level: 5  Pain Type: Chronic pain  Pain Location: Back  Pain Orientation: Mid,Lower    Objective  Cognition  Overall Cognitive Status: WFL         ADL  Grooming: Setup (pt set up for oral care upon entry)  Additional Comments: improved grasp noted on toothbrush this date, no tremors noted with task. Built up handles provided in previous session however pt does not need to use at this time. Type of ROM/Therapeutic Exercise  Type of ROM/Therapeutic Exercise: Free weights  Comment: Pt engaged in BUE ex's while in bed to promote increased strengthening and endurance. Pt completes each exercise to tolerance; x20 reps, 1/2# weight. Exercises  Scapular Protraction: x  Scapular Retraction: x  Shoulder Flexion: x  Shoulder Extension: x  Elbow Flexion: x  Elbow Extension: x  Supination: x  Pronation: x  Wrist Flexion: x  Wrist Extension: x     Assessment  Performance deficits / Impairments: Decreased functional mobility ; Decreased ADL status; Decreased strength;Decreased safe awareness;Decreased endurance;Decreased sensation;Decreased balance  Prognosis: Good  Discharge Recommendations: Continue to assess pending progress  Activity Tolerance: Patient Tolerated treatment well  Safety Devices in place: Yes  Type of devices: Left in bed;Call light within reach; All fall risk precautions in place;Nurse notified            Plan  Safety Devices  Safety Devices in place: Yes  Type of devices: Left in bed,Call light within reach,All fall risk precautions in place,Nurse notified  Plan  Times per week: 4-5  Times per day: Daily  Current Treatment Recommendations: Strengthening,ROM,Balance Training,Functional Mobility Jose Alfredo Ren Education & Training,Patient/Caregiver Education & Training,Equipment Evaluation, Education, & procurement,Self-Care / ADL      Goals  Short term goals  Time Frame for Short term goals: By discharge  Short term goal 1: Patient will perform BADLs with mod I and good safety  Short term goal 2: Patient will tolerate standing 4+ minutes, with no LOB, mod I and good safety  Short term goal 3: Patient will perform transfers/functional mobility with mod I   Short term goal 4: Patient will V/D at least 3 EC/WS/fall prevention strategies to promote safety with daily routine  Short term goal 5: Patient will actively participate in 15+ minutes of therapeutic activity to promote independence with self care and mobility    OT Individual Minutes  Time In: 1054  Time Out: 1118  Minutes: 24    Electronically signed by DAYANA Encinas on 12/28/21 at 1:12 PM EST

## 2021-12-28 NOTE — PLAN OF CARE
Problem: Falls - Risk of:  Goal: Will remain free from falls  Description: Will remain free from falls  Outcome: Ongoing  Note: Patient remains free of incidence/ injury. Bed remains in low position. Side rails up x2. Problem: Pain:  Goal: Control of acute pain  Description: Control of acute pain  Outcome: Ongoing  Note: Pt denies need for prn pain medication this shift. Problem: Skin Integrity:  Goal: Will show no infection signs and symptoms  Description: Will show no infection signs and symptoms  Outcome: Ongoing  Note: Patient displays no new signs of infection during this shift. Goal: Absence of new skin breakdown  Description: Absence of new skin breakdown  Outcome: Ongoing  Note: No new occurrence of skin breakdown noted during this shift.

## 2021-12-28 NOTE — PROGRESS NOTES
Pt called writer into room staying she would swallow lunch and it was hurting. Writer asked if it was chest pain, she wasn't sure. She states her shoulder is also hurting. Writer talked with RN manager, Guilherme Luna in regards to an ekg being done. Writer had ALE Harris complete and ekg which showed NSR.  Guilherme Luna, RN manager linda Stark

## 2021-12-28 NOTE — PROGRESS NOTES
Physical Therapy  Facility/Department: [facility] MED SURG  Daily Treatment Note  NAME: Isabel Mix  : 1960  MRN: [id number]    Date of Service: 2021    Discharge Recommendations:  Home with assist PRN   PT Equipment Recommendations  Equipment Needed: No    Assessment   Body structures, Functions, Activity limitations: Decreased functional mobility ; Decreased strength; Increased pain  History: chronic back pain  REQUIRES PT FOLLOW UP: Yes  Activity Tolerance  Activity Tolerance: Patient Tolerated treatment well;Patient limited by endurance     Patient Diagnosis(es): The encounter diagnosis was Acute right-sided thoracic back pain. has a past medical history of Backache, unspecified, CHF (congestive heart failure) ([facility] [address] Chronic kidney disease, Coronary atherosclerosis of artery bypass graft, Cramp of limb, Gallstones, Hypertension, Insomnia, Pneumonia, Type II or unspecified type diabetes mellitus with renal manifestations, not stated as uncontrolled(250.40), Type II or unspecified type diabetes mellitus without mention of complication, not stated as uncontrolled, and Unspecified vitamin D deficiency. has a past surgical history that includes Coronary artery bypass graft; Knee arthroscopy; Carpal tunnel release; Breast surgery; Tonsillectomy; Hand surgery; Ankle fracture surgery; Cholecystectomy, open (N/A); IR TUNNELED CVC PLACE WO SQ PORT/PUMP > 5 YEARS (2021); AV fistula creation (2021); and Dialysis fistula creation (Left, 2021). Restrictions  Restrictions/Precautions  Restrictions/Precautions: General Precautions,Fall Risk  Required Braces or Orthoses?: No  Subjective   General  Chart Reviewed: Yes  Response To Previous Treatment: Patient with no complaints from previous session.   Family / Caregiver Present: No  Subjective  Subjective: Pt lying in bed, pleasant and agreeable to PT tx  General Comment  Comments: DEDE Epperson approved therapy   Pain Screening  Patient Currently in Pain: Denies  Vital Signs  Patient Currently in Pain: Denies  Oxygen Therapy  O2 Device: Nasal cannula  O2 Flow Rate (L/min): 2 L/min  Patient Observation  Observations: B tremors in UE. Orientation  Orientation  Overall Orientation Status: Within Functional Limits  Objective   Bed mobility  Rolling to Right: Stand by assistance  Supine to Sit: Stand by assistance  Sit to Supine: Minimal assistance (assist with B LEs)  Scooting: Stand by assistance  Comment: Patient reported feeling dizzy upon sitting EOB  Transfers  Sit to Stand: Minimal Assistance  Stand to sit: Contact guard assistance  Ambulation  Ambulation?: Yes  Ambulation 1  Surface: level tile  Device: Rolling Walker  Assistance: Contact guard assistance  Distance: 3 lateral side steps to left towards HOB; 3 steps FWD/BWD   Comments: Patient had B buckeling of knees while trying to ambulate   Stairs/Curb  Stairs?: No        Other exercises  Other exercises?: Yes  Other exercises 1: bed mobility x2  Other exercises 2: STS x5 with RW   Other exercises 3: lateral weight shifting and marching in place while standing   Other exercises 4: seated B LE exercises x15 reps      Goals  Short term goals  Time Frame for Short term goals: 2-3 days  Short term goal 1: mod-I bed mobility  Short term goal 2: mod-I transfers  Short term goal 3: mod-I gait with RW x 50-100ft    Plan    Plan  Times per week: 5-6x/wk  Current Treatment Recommendations: Strengthening,Balance Training,Functional Mobility Training,Transfer Training,Gait Training  Safety Devices  Type of devices:  All fall risk precautions in place,Call light within reach,Chair alarm in place,Gait belt,Left in chair        12/28/21 1138   PT Individual Minutes   Time In [facility] 227   Minutes 34     Electronically signed by Chrissy Chakraborty PTA on 12/28/21 at 11:46 AM EST

## 2021-12-28 NOTE — PROGRESS NOTES
Department of Internal Medicine  Nephrology [facility], MD   Progress Note    Reason for consultation: Management of hemodialysis dependent end-stage renal disease. Consulting physician: Estevan Dunlap MD    Interval history:   Patient was seen and examined today had dialysis yesterday, she is awake and responsive. She is not in acute respiratory distress. Blood pressure stable    History of present illness: This is a 59 y.o. female with a significant past medical history of Chronic atrial fibrillation [on Eliquis], Type 2 diabetes mellitus, essential systemic hypertension, coronary artery disease [s/p CABG in 2004 and PTCA in 2019], heart failure with preserved ejection fraction [LVEF 55%] and end-stage renal disease secondary to diabetic and hypertensive nephropathy [on routine hemodialysis on a Monday/Wednesday/Friday schedule at [address] [place] [address] dialysis unit on [facility] under the care of Dr. Gloria Olivier since August 2001 using IJ tunneled dialysis catheter], who presented to the hospital via EMS for evaluation of worsening low back pain rated 10/10. She was seen and examined this morning and she remains quite lethargic and stuporous and difficult to arouse. She however is not in acute respiratory distress.     Scheduled Meds:   tiZANidine  2 mg Oral TID    lidocaine  1 patch TransDERmal Daily    aspirin  81 mg Oral Daily    atorvastatin  80 mg Oral Daily    busPIRone  7.5 mg Oral TID    carvedilol  6.25 mg Oral BID    docusate sodium  100 mg Oral BID    apixaban  5 mg Oral BID    febuxostat  40 mg Oral Daily    furosemide  80 mg Oral BID    gabapentin  300 mg Oral BID    insulin glargine  73 Units SubCUTAneous BID    pantoprazole  40 mg Oral QAM AC    polyethylene glycol  17 g Oral Daily    ranolazine  1,000 mg Oral BID    tamsulosin  0.4 mg Oral Daily    traZODone  75 mg Oral Nightly    sodium chloride flush  5-40 mL IntraVENous 2 times per day    insulin lispro  0-18 Units SubCUTAneous TID WC    insulin lispro  0-9 Units SubCUTAneous Nightly     Continuous Infusions:   dextrose      sodium chloride       PRN Meds:.tiZANidine, senna, glucose, dextrose, glucagon (rDNA), dextrose, sodium chloride flush, sodium chloride, ondansetron **OR** ondansetron, polyethylene glycol, acetaminophen **OR** acetaminophen, HYDROcodone 5 mg - acetaminophen, sodium citrate, sodium citrate    Physical Exam:    VITALS:  BP (!) 157/75   Pulse 65   Temp 98.6 °F (37 °C) (Oral)   Resp 16   Ht 5' 5\" (1.651 m)   Wt 261 lb 0.4 oz (118.4 kg)   SpO2 97%   BMI 43.44 kg/m²   24HR INTAKE/OUTPUT:    No intake or output data in the 24 hours ending 12/28/21 1202    Constitutional: slowed mentation and Lethargic and difficult to arouse    Skin: Skin color, texture, turgor normal. No rashes or lesions    Head: Normocephalic, without obvious abnormality, atraumatic     Cardiovascular/Edema: regular rate and rhythm, S1, S2 normal, no murmur, click, rub or gallop    Respiratory: Lungs: clear to auscultation bilaterally    Abdomen: soft, non-tender; bowel sounds normal; no masses,  no organomegaly    Back: symmetric, no curvature. ROM normal. No CVA tenderness. Extremities: edema +    Neuro:  Stuporous and lethargic. CBC:   No results for input(s): WBC, HGB, PLT in the last 72 hours.   BMP:    Recent Labs     12/26/21  0633 12/27/21  1003 12/28/21  0634    133* 136   K 4.8 5.0 4.4   CL 98 96* 97*   CO2 23 23 26   BUN 44* 58* 36*   CREATININE 4.60* 5.16* 3.93*   GLUCOSE 213* 266* 108*     Lab Results   Component Value Date    NITRU NEGATIVE 11/14/2021    COLORU Yellow 11/14/2021    PHUR 5.0 11/14/2021    WBCUA 5 TO 10 11/14/2021    RBCUA 0 TO 2 11/14/2021    MUCUS NOT REPORTED 11/14/2021    TRICHOMONAS NOT REPORTED 11/14/2021    YEAST FEW 11/14/2021    BACTERIA MANY 11/14/2021    SPECGRAV 1.014 11/14/2021    LEUKOCYTESUR TRACE 11/14/2021    UROBILINOGEN Normal 11/14/2021    BILIRUBINUR NEGATIVE 11/14/2021 GLUCOSEU 3+ 11/14/2021    KETUA NEGATIVE 11/14/2021    AMORPHOUS NOT REPORTED 11/14/2021     Urine Sodium:     Lab Results   Component Value Date    JASON 47 11/14/2021     Urine Chloride:    Lab Results   Component Value Date    CLUR 26 11/14/2021     Urine Protein:     Lab Results   Component Value Date    TPU 20 11/12/2021     Urine Creatinine:     Lab Results   Component Value Date    LABCREA 72.0 11/14/2021     IMPRESSION/RECOMMENDATIONS:      1.  End-stage renal disease - we will maintain MWF hemodialysis schedule. Vascular access is IJ tunneled hemodialysis catheter. Renal diet,i.e 2-gram sodium,2-gram potassium,1500 ml fluid restriction,1-gram phosphorus, 1800 KCal and 1.2 gram protein per day. 2.  Altered mental status - rule out narcotic analgesia overdosage. blood glucose is within normal range. Sepsis will be ruled out also. Mental status is back to baseline. 3.   Normocytic anemia of chronic kidney disease - hemoglobin 10.4 g/dL is within target range. 4.  Systemic hypertension - blood pressure control is adequate. Plan    Continue hemodialysis Monday Wednesday Friday hemodialysis tomorrow      Prognosis is guarded.     Jessica Ghosh MD   Attending Nephrologist  12/28/2021 12:02 PM

## 2021-12-28 NOTE — DISCHARGE SUMMARY
TREATMENT LOCATION:  [] [address]/ [facility] [address] [place] [address] [address]: [phone]  F: [phone] [x] [address]: [phone]  F: [phone]     Lymphedema Therapy Discharge Note    Date: 2021      Patient: Scott Mccarthy  : 1960  MRN: [id number]    Referring Physician: AFSANEH Corado       Phone: [phone]                       Fax: [phone]  Insurance: Ivonne Traylor ( [id number]) - No Copay, 40 visit limit combined with PT/OT, No Preauth required. # of visits allowed/remainin  Medical Diagnosis: Lymphedema   Rehab Codes: I89.0     Onset Date: 21  Visit# / total visits:               Total visits attended: 6        Cancels/No shows: 0  Date of initial visit: 21                Date of final visit: 21      Subjective:  Refer to initial evaluation/ treatment notes. Objective:  Refer to initial evaluation/treatment notes. Assessment:  Refer to initial evaluation/ treatment notes. Treatment to Date:  [x] Therapeutic Exercise           [x] Instruction in Home Program                       [x] Manual Therapy  [x] Self-Care/Home Management  [x] Vasopneumatic Pump             [] Other:    Discharge Status:   [] Treatment goals were met. [] Pt received maximum benefit. No further therapy indicated at this time. [x] Pt to continue exercise/home instructions independently  [x] Pt has not called or returned to clinic in over 90 days with additional concerns. Functional Assessment Used: Lymphedema Life Impact Scale (LLIS) Score: [x]? Eval 63%   []? 10th visit____  []? D/C - Pt did not return to final visits. She has also started seeing outpatient OT for rehabilitation. Electronically signed by Sakina Huynh OT on 2021 at 3:41 PM    The patient is being discharged due to the status listed above.  If patient would like to return to the clinic for

## 2021-12-28 NOTE — PROGRESS NOTES
Progress Note  Date:2021       Room:[facility]  Patient Ling Carranza     YOB: 1960     Age:61 y.o. Subjective    Subjective:  Symptoms:  Stable. (Alert, interactive. Complains about dropping her butter this morning. ). Diet:  Adequate intake. Activity level: Impaired due to weakness. Pain:  She reports no pain. Review of Systems  Objective         Vitals Last 24 Hours:  TEMPERATURE:  Temp  Av °F (36.7 °C)  Min: 97.4 °F (36.3 °C)  Max: 98.4 °F (36.9 °C)  RESPIRATIONS RANGE: Resp  Av  Min: 16  Max: 18  PULSE OXIMETRY RANGE: SpO2  Av %  Min: 94 %  Max: 94 %  PULSE RANGE: Pulse  Av.6  Min: 60  Max: 71  BLOOD PRESSURE RANGE: Systolic (17TKF), OUJ:593 , Min:97 , UCN:906   ; Diastolic (49NPJ), FUF:74, Min:40, Max:89    I/O (24Hr): No intake or output data in the 24 hours ending 21  Objective:  General Appearance:  Comfortable. Vital signs: (most recent): Blood pressure (!) 132/52, pulse 64, temperature 97.4 °F (36.3 °C), temperature source Oral, resp. rate 16, height 5' 5\" (1.651 m), weight 261 lb 0.4 oz (118.4 kg), SpO2 94 %. Vital signs are normal.      Labs/Imaging/Diagnostics    Labs:  CBC:No results for input(s): WBC, RBC, HGB, HCT, MCV, RDW, PLT in the last 72 hours. CHEMISTRIES:Recent Labs     21  0633 21  1003 21  0634    133* 136   K 4.8 5.0 4.4   CL 98 96* 97*   CO2 23 23 26   BUN 44* 58* 36*   CREATININE 4.60* 5.16* 3.93*   GLUCOSE 213* 266* 108*     PT/INR:No results for input(s): PROTIME, INR in the last 72 hours. APTT:No results for input(s): APTT in the last 72 hours. LIVER PROFILE:No results for input(s): AST, ALT, BILIDIR, BILITOT, ALKPHOS in the last 72 hours. Imaging Last 24 Hours:  No results found.   Assessment//Plan           Hospital Problems           Last Modified POA    * (Principal) Acute right-sided thoracic back pain 2021 Yes    Diabetic polyneuropathy associated with type 2 diabetes mellitus ([facility] 12/24/2021 Yes    Spinal stenosis of lumbar region with neurogenic claudication 12/24/2021 Yes    Stage 4 chronic kidney disease ([facility] 12/24/2021 Yes    Type 2 diabetes mellitus with kidney complication, with long-term current use of insulin ([facility] 12/24/2021 Yes    Essential hypertension 12/24/2021 Yes    Ischemic stroke of frontal lobe ([facility] 12/24/2021 Yes    Morbid obesity with BMI of 40.0-44.9, adult ([facility] 12/24/2021 Yes    Generalized weakness 12/26/2021 Yes    Tremor 58/03/3270 Yes    Metabolic encephalopathy 59/93/0371 Yes        Assessment & Plan  Plan d/c to [facility]     MRIs pending    Electronically signed by Alexandro Somers MD on 12/28/21 at 8:28 AM EST

## 2021-12-28 NOTE — PLAN OF CARE
Problem: Falls - Risk of:  Goal: Will remain free from falls  Description: Will remain free from falls  12/27/2021 2348 by Carisa Arboleda RN  Outcome: Ongoing  Note: No falls noted this shift. Bed kept in low position. Safe environment maintained. Bedside table & call light in reach. Uses call light appropriately when needing assistance. 12/27/2021 1811 by Nova Rivas RN  Outcome: Ongoing  Goal: Absence of physical injury  Description: Absence of physical injury  12/27/2021 2348 by Carisa Arboleda RN  Outcome: Ongoing  12/27/2021 1811 by Nova Rivas RN  Outcome: Ongoing     Problem: Pain:  Goal: Pain level will decrease  Description: Pain level will decrease  12/27/2021 2348 by Carisa Arboleda RN  Outcome: Ongoing  Note:   Assess the location, characteristics, onset, duration, frequency, quality, and severity of pain. Encourage immediate report of pain. Use appropriate pain scale to rate pain. Manage pain using nonpharmacologic/pharmacologic interventions.    12/27/2021 1811 by Nova Rivas RN  Outcome: Ongoing  Goal: Control of acute pain  Description: Control of acute pain  12/27/2021 2348 by Carisa Arboleda RN  Outcome: Ongoing  12/27/2021 1811 by Nova Rivas RN  Outcome: Ongoing     Problem: Skin Integrity:  Goal: Will show no infection signs and symptoms  Description: Will show no infection signs and symptoms  12/27/2021 2348 by Carisa Arboleda RN  Outcome: Ongoing  12/27/2021 1811 by Nova Rivas RN  Outcome: Ongoing  Goal: Absence of new skin breakdown  Description: Absence of new skin breakdown  12/27/2021 2348 by Carisa Arboleda RN  Outcome: Ongoing  12/27/2021 1811 by Nova Rivas RN  Outcome: Ongoing

## 2021-12-29 ENCOUNTER — APPOINTMENT (OUTPATIENT)
Dept: ULTRASOUND IMAGING | Age: 61
End: 2021-12-29
Payer: COMMERCIAL

## 2021-12-29 LAB
ANION GAP SERPL CALCULATED.3IONS-SCNC: 10 MMOL/L (ref 9–17)
BUN BLDV-MCNC: 38 MG/DL (ref 8–23)
BUN/CREAT BLD: ABNORMAL (ref 9–20)
CALCIUM SERPL-MCNC: 8.9 MG/DL (ref 8.6–10.4)
CHLORIDE BLD-SCNC: 95 MMOL/L (ref 98–107)
CO2: 27 MMOL/L (ref 20–31)
CREAT SERPL-MCNC: 3.59 MG/DL (ref 0.5–0.9)
GFR AFRICAN AMERICAN: 16 ML/MIN
GFR NON-AFRICAN AMERICAN: 13 ML/MIN
GFR SERPL CREATININE-BSD FRML MDRD: ABNORMAL ML/MIN/{1.73_M2}
GFR SERPL CREATININE-BSD FRML MDRD: ABNORMAL ML/MIN/{1.73_M2}
GLUCOSE BLD-MCNC: 176 MG/DL (ref 70–99)
HCT VFR BLD CALC: 27 % (ref 36–46)
HEMOGLOBIN: 9.5 G/DL (ref 12–16)
MCH RBC QN AUTO: 34.2 PG (ref 26–34)
MCHC RBC AUTO-ENTMCNC: 35 G/DL (ref 31–37)
MCV RBC AUTO: 97.7 FL (ref 80–100)
NRBC AUTOMATED: ABNORMAL PER 100 WBC
PDW BLD-RTO: 14.8 % (ref 11.5–14.9)
PLATELET # BLD: 121 K/UL (ref 150–450)
PMV BLD AUTO: 8.7 FL (ref 6–12)
POTASSIUM SERPL-SCNC: 3.9 MMOL/L (ref 3.7–5.3)
RBC # BLD: 2.77 M/UL (ref 4–5.2)
SODIUM BLD-SCNC: 132 MMOL/L (ref 135–144)
WBC # BLD: 5 K/UL (ref 3.5–11)

## 2021-12-29 PROCEDURE — 2500000003 HC RX 250 WO HCPCS: Performed by: INTERNAL MEDICINE

## 2021-12-29 PROCEDURE — 90935 HEMODIALYSIS ONE EVALUATION: CPT

## 2021-12-29 PROCEDURE — 6370000000 HC RX 637 (ALT 250 FOR IP): Performed by: PSYCHIATRY & NEUROLOGY

## 2021-12-29 PROCEDURE — G0378 HOSPITAL OBSERVATION PER HR: HCPCS

## 2021-12-29 PROCEDURE — 6370000000 HC RX 637 (ALT 250 FOR IP): Performed by: FAMILY MEDICINE

## 2021-12-29 PROCEDURE — 6370000000 HC RX 637 (ALT 250 FOR IP): Performed by: STUDENT IN AN ORGANIZED HEALTH CARE EDUCATION/TRAINING PROGRAM

## 2021-12-29 PROCEDURE — 85027 COMPLETE CBC AUTOMATED: CPT

## 2021-12-29 PROCEDURE — 76536 US EXAM OF HEAD AND NECK: CPT

## 2021-12-29 PROCEDURE — 80048 BASIC METABOLIC PNL TOTAL CA: CPT

## 2021-12-29 PROCEDURE — 36415 COLL VENOUS BLD VENIPUNCTURE: CPT

## 2021-12-29 PROCEDURE — 2580000003 HC RX 258: Performed by: FAMILY MEDICINE

## 2021-12-29 RX ORDER — DOCUSATE SODIUM 100 MG/1
100 CAPSULE, LIQUID FILLED ORAL 2 TIMES DAILY
Status: CANCELLED | OUTPATIENT
Start: 2021-12-29

## 2021-12-29 RX ORDER — RANOLAZINE 500 MG/1
1000 TABLET, EXTENDED RELEASE ORAL 2 TIMES DAILY
Status: CANCELLED | OUTPATIENT
Start: 2021-12-29

## 2021-12-29 RX ORDER — ACETAMINOPHEN 325 MG/1
650 TABLET ORAL EVERY 6 HOURS PRN
Status: CANCELLED | OUTPATIENT
Start: 2021-12-29

## 2021-12-29 RX ORDER — POLYETHYLENE GLYCOL 3350 17 G/17G
17 POWDER, FOR SOLUTION ORAL DAILY PRN
Status: CANCELLED | OUTPATIENT
Start: 2021-12-29

## 2021-12-29 RX ORDER — POLYETHYLENE GLYCOL 3350 17 G/17G
17 POWDER, FOR SOLUTION ORAL DAILY
Status: CANCELLED | OUTPATIENT
Start: 2021-12-29

## 2021-12-29 RX ORDER — GABAPENTIN 300 MG/1
300 CAPSULE ORAL 2 TIMES DAILY
Status: CANCELLED | OUTPATIENT
Start: 2021-12-29

## 2021-12-29 RX ORDER — ONDANSETRON 2 MG/ML
4 INJECTION INTRAMUSCULAR; INTRAVENOUS EVERY 6 HOURS PRN
Status: CANCELLED | OUTPATIENT
Start: 2021-12-29

## 2021-12-29 RX ORDER — PANTOPRAZOLE SODIUM 40 MG/1
40 TABLET, DELAYED RELEASE ORAL
Status: CANCELLED | OUTPATIENT
Start: 2021-12-30

## 2021-12-29 RX ORDER — TAMSULOSIN HYDROCHLORIDE 0.4 MG/1
0.4 CAPSULE ORAL DAILY
Status: CANCELLED | OUTPATIENT
Start: 2021-12-29

## 2021-12-29 RX ORDER — CARVEDILOL 6.25 MG/1
6.25 TABLET ORAL 2 TIMES DAILY
Status: CANCELLED | OUTPATIENT
Start: 2021-12-29

## 2021-12-29 RX ORDER — ASPIRIN 81 MG/1
81 TABLET, CHEWABLE ORAL DAILY
Status: CANCELLED | OUTPATIENT
Start: 2021-12-29

## 2021-12-29 RX ORDER — TIZANIDINE 2 MG/1
2 TABLET ORAL 3 TIMES DAILY
Status: CANCELLED | OUTPATIENT
Start: 2021-12-29

## 2021-12-29 RX ORDER — HYDROCODONE BITARTRATE AND ACETAMINOPHEN 5; 325 MG/1; MG/1
1 TABLET ORAL EVERY 6 HOURS PRN
Status: CANCELLED | OUTPATIENT
Start: 2021-12-29

## 2021-12-29 RX ORDER — FUROSEMIDE 40 MG/1
80 TABLET ORAL 2 TIMES DAILY
Status: CANCELLED | OUTPATIENT
Start: 2021-12-29

## 2021-12-29 RX ORDER — ATORVASTATIN CALCIUM 80 MG/1
80 TABLET, FILM COATED ORAL DAILY
Status: CANCELLED | OUTPATIENT
Start: 2021-12-29

## 2021-12-29 RX ORDER — TIZANIDINE 4 MG/1
4 TABLET ORAL EVERY 6 HOURS PRN
Status: CANCELLED | OUTPATIENT
Start: 2021-12-29

## 2021-12-29 RX ORDER — TRAZODONE HYDROCHLORIDE 50 MG/1
75 TABLET ORAL NIGHTLY
Status: CANCELLED | OUTPATIENT
Start: 2021-12-29

## 2021-12-29 RX ORDER — SODIUM CITRATE 4 % (5 ML)
1.9 SYRINGE (ML) MISCELLANEOUS PRN
Status: CANCELLED | OUTPATIENT
Start: 2021-12-29 | Stop reason: RX

## 2021-12-29 RX ORDER — INSULIN GLARGINE 100 [IU]/ML
73 INJECTION, SOLUTION SUBCUTANEOUS 2 TIMES DAILY
Status: CANCELLED | OUTPATIENT
Start: 2021-12-29

## 2021-12-29 RX ORDER — ACETAMINOPHEN 650 MG/1
650 SUPPOSITORY RECTAL EVERY 6 HOURS PRN
Status: CANCELLED | OUTPATIENT
Start: 2021-12-29

## 2021-12-29 RX ORDER — SENNA PLUS 8.6 MG/1
2 TABLET ORAL DAILY PRN
Status: CANCELLED | OUTPATIENT
Start: 2021-12-29

## 2021-12-29 RX ORDER — BUSPIRONE HYDROCHLORIDE 5 MG/1
7.5 TABLET ORAL 3 TIMES DAILY
Status: CANCELLED | OUTPATIENT
Start: 2021-12-29

## 2021-12-29 RX ORDER — FEBUXOSTAT 40 MG/1
40 TABLET, FILM COATED ORAL DAILY
Status: CANCELLED | OUTPATIENT
Start: 2021-12-29

## 2021-12-29 RX ORDER — ONDANSETRON 4 MG/1
4 TABLET, ORALLY DISINTEGRATING ORAL EVERY 8 HOURS PRN
Status: CANCELLED | OUTPATIENT
Start: 2021-12-29

## 2021-12-29 RX ORDER — LIDOCAINE 4 G/G
1 PATCH TOPICAL DAILY
Status: CANCELLED | OUTPATIENT
Start: 2021-12-29

## 2021-12-29 RX ADMIN — CARVEDILOL 6.25 MG: 6.25 TABLET, FILM COATED ORAL at 22:41

## 2021-12-29 RX ADMIN — APIXABAN 5 MG: 5 TABLET, FILM COATED ORAL at 09:07

## 2021-12-29 RX ADMIN — HYDROCODONE BITARTRATE AND ACETAMINOPHEN 1 TABLET: 5; 325 TABLET ORAL at 15:04

## 2021-12-29 RX ADMIN — SODIUM CHLORIDE, PRESERVATIVE FREE 10 ML: 5 INJECTION INTRAVENOUS at 23:00

## 2021-12-29 RX ADMIN — DOCUSATE SODIUM 100 MG: 100 CAPSULE ORAL at 22:41

## 2021-12-29 RX ADMIN — TIZANIDINE 2 MG: 2 TABLET ORAL at 13:40

## 2021-12-29 RX ADMIN — GABAPENTIN 300 MG: 300 CAPSULE ORAL at 22:41

## 2021-12-29 RX ADMIN — INSULIN GLARGINE 73 UNITS: 100 INJECTION, SOLUTION SUBCUTANEOUS at 22:42

## 2021-12-29 RX ADMIN — TIZANIDINE 2 MG: 2 TABLET ORAL at 09:07

## 2021-12-29 RX ADMIN — FEBUXOSTAT 40 MG: 40 TABLET, FILM COATED ORAL at 09:17

## 2021-12-29 RX ADMIN — FUROSEMIDE 80 MG: 40 TABLET ORAL at 17:25

## 2021-12-29 RX ADMIN — RANOLAZINE 1000 MG: 500 TABLET, FILM COATED, EXTENDED RELEASE ORAL at 09:07

## 2021-12-29 RX ADMIN — BUSPIRONE HYDROCHLORIDE 7.5 MG: 5 TABLET ORAL at 13:40

## 2021-12-29 RX ADMIN — TAMSULOSIN HYDROCHLORIDE 0.4 MG: 0.4 CAPSULE ORAL at 09:08

## 2021-12-29 RX ADMIN — FUROSEMIDE 80 MG: 40 TABLET ORAL at 09:07

## 2021-12-29 RX ADMIN — TIZANIDINE 2 MG: 2 TABLET ORAL at 22:41

## 2021-12-29 RX ADMIN — INSULIN GLARGINE 73 UNITS: 100 INJECTION, SOLUTION SUBCUTANEOUS at 09:05

## 2021-12-29 RX ADMIN — ASPIRIN 81 MG: 81 TABLET, CHEWABLE ORAL at 09:07

## 2021-12-29 RX ADMIN — ACETAMINOPHEN 650 MG: 325 TABLET, FILM COATED ORAL at 13:44

## 2021-12-29 RX ADMIN — DOCUSATE SODIUM 100 MG: 100 CAPSULE ORAL at 09:07

## 2021-12-29 RX ADMIN — SODIUM CHLORIDE, PRESERVATIVE FREE 10 ML: 5 INJECTION INTRAVENOUS at 09:12

## 2021-12-29 RX ADMIN — INSULIN LISPRO 6 UNITS: 100 INJECTION, SOLUTION INTRAVENOUS; SUBCUTANEOUS at 17:26

## 2021-12-29 RX ADMIN — PANTOPRAZOLE SODIUM 40 MG: 40 TABLET, DELAYED RELEASE ORAL at 06:36

## 2021-12-29 RX ADMIN — Medication 1.9 ML: at 13:00

## 2021-12-29 RX ADMIN — RANOLAZINE 1000 MG: 500 TABLET, FILM COATED, EXTENDED RELEASE ORAL at 22:41

## 2021-12-29 RX ADMIN — TRAZODONE HYDROCHLORIDE 75 MG: 50 TABLET ORAL at 22:41

## 2021-12-29 RX ADMIN — INSULIN LISPRO 3 UNITS: 100 INJECTION, SOLUTION INTRAVENOUS; SUBCUTANEOUS at 13:41

## 2021-12-29 RX ADMIN — APIXABAN 5 MG: 5 TABLET, FILM COATED ORAL at 22:41

## 2021-12-29 RX ADMIN — GABAPENTIN 300 MG: 300 CAPSULE ORAL at 09:07

## 2021-12-29 RX ADMIN — TIZANIDINE 4 MG: 2 TABLET ORAL at 16:00

## 2021-12-29 RX ADMIN — HYDROCODONE BITARTRATE AND ACETAMINOPHEN 1 TABLET: 5; 325 TABLET ORAL at 22:41

## 2021-12-29 RX ADMIN — INSULIN LISPRO 2 UNITS: 100 INJECTION, SOLUTION INTRAVENOUS; SUBCUTANEOUS at 22:41

## 2021-12-29 RX ADMIN — ATORVASTATIN CALCIUM 80 MG: 80 TABLET, FILM COATED ORAL at 09:07

## 2021-12-29 RX ADMIN — POLYETHYLENE GLYCOL 3350 17 G: 17 POWDER, FOR SOLUTION ORAL at 09:12

## 2021-12-29 RX ADMIN — BUSPIRONE HYDROCHLORIDE 7.5 MG: 5 TABLET ORAL at 09:08

## 2021-12-29 RX ADMIN — BUSPIRONE HYDROCHLORIDE 7.5 MG: 5 TABLET ORAL at 22:41

## 2021-12-29 RX ADMIN — Medication 1.9 ML: at 16:40

## 2021-12-29 ASSESSMENT — PAIN DESCRIPTION - LOCATION
LOCATION: BACK
LOCATION: BACK

## 2021-12-29 ASSESSMENT — PAIN SCALES - GENERAL
PAINLEVEL_OUTOF10: 8
PAINLEVEL_OUTOF10: 8
PAINLEVEL_OUTOF10: 10

## 2021-12-29 NOTE — PLAN OF CARE
Problem: Falls - Risk of:  Goal: Will remain free from falls  Description: Will remain free from falls  12/29/2021 0457 by Russell Rome RN  Outcome: Ongoing  12/28/2021 1836 by Daina Villareal RN  Outcome: Ongoing  Note: Patient remains free of incidence/ injury. Bed remains in low position. Side rails up x2. Goal: Absence of physical injury  Description: Absence of physical injury  Outcome: Ongoing     Problem: Pain:  Goal: Pain level will decrease  Description: Pain level will decrease  Outcome: Ongoing  Goal: Control of acute pain  Description: Control of acute pain  12/29/2021 0457 by Russell Rome RN  Outcome: Ongoing  12/28/2021 1836 by Daina Villareal RN  Outcome: Ongoing  Note: Pt denies need for prn pain medication this shift. Problem: Skin Integrity:  Goal: Will show no infection signs and symptoms  Description: Will show no infection signs and symptoms  12/29/2021 0457 by Russell Rome RN  Outcome: Ongoing  12/28/2021 1836 by Daina Villareal RN  Outcome: Ongoing  Note: Patient displays no new signs of infection during this shift. Goal: Absence of new skin breakdown  Description: Absence of new skin breakdown  12/29/2021 0457 by Russell Rome RN  Outcome: Ongoing  12/28/2021 1836 by Daina Villareal RN  Outcome: Ongoing  Note: No new occurrence of skin breakdown noted during this shift.

## 2021-12-29 NOTE — PROGRESS NOTES
Patient approved for ARU.     Reviewed MRI reports; thyroid US ordered to evaluate thyroid nodule found on cervical spine MRI    Helen Miller MD, Monda Day 3552

## 2021-12-29 NOTE — PROGRESS NOTES
Physical Therapy        Physical Therapy Cancel Note      DATE: 2021    NAME: Grady Mcknight  MRN: [id number]   : 1960      Patient not seen this date for Physical Therapy due to:     Hemodialysis: Cx, pt taken to dialysis.           Electronically signed by Garett Maher PTA on 2021 at 1:17 PM

## 2021-12-29 NOTE — DISCHARGE SUMMARY
[] [facility] ([facility]) @ [facility]  [address], [address] [address]  Phone [phone]  Fax [phone]    Physical Therapy Discharge Note    Date: 2021      Patient: Andrew Durbin  : 1960  MRN: [id number]    4940 Romi Baig MD & AFSANEH Charles-FRANKY                                 Insurance: [employer] [place]  combined with OT. Hard Max  Medical Diagnosis: I63.9 (ICD-10-CM) - Cerebrovascular accident (CVA), unspecified mechanism (HCC)          Rehab Codes: Z74.0 reduced mobility, R53 weakness, R 53. Deconditioning, R29.6 fall risk   Date of symptom onset: initial CVA 21  Next 's appt.: 21 - Dr. Jyoti Shrestha   Visit# / total visits:                     Cancels/No Shows: 00   Date of initial visit: 21       Per chart review pt went to hospital  after PT appointment. She continues to be hospitalized this date 21. In review of note, pt will be going to ARU so outpatient PT will need to be discharged. Once pt completes her rehab stay, would be happy to evaluate again for outpatient PT when appropriate under a new referral.     It Is My Understanding That The:  [x] Other: pt hospitalized and will be going to ARU.         Treatment Included:     [x] Therapeutic Exercise   91077  [] Iontophoresis: 4 mg/mL Dexamethasone Sodium Phosphate  mAmin  60278   [x] Therapeutic Activity  54832 [] Vasopneumatic cold with compression  82200    [x] Gait Training   98745 [] Ultrasound   37502   [x] Neuromuscular Re-education  30051 [] Electrical Stimulation Unattended  87968   [] Manual Therapy  39937 [] Electrical Stimulation Attended  79769   [x] Instruction in HEP  [] Lumbar/Cervical Traction  38966   [] Aquatic Therapy   92044 [] Cold/hotpack    [] Massage   86097      [] Dry Needling, 1 or 2 muscles  94773   [] Biofeedback, first 15 minutes   40000  [] Biofeedback, additional 15 minutes   15496 [] Dry Needling, 3 or more muscles  28917                If you have any questions or concerns regarding this patient's care, please contact us.    Thank you for your referral.      Electronically signed by: Verner Crumble, PT

## 2021-12-29 NOTE — PROGRESS NOTES
HEMODIALYSIS PRE-TREATMENT NOTE    Patient Identifiers prior to treatment: , mrn name    Isolation Required: yes                  Isolation Type: contact       (please document if patient is being managed as a PUI/COVID-19 patient)        Hepatitis status:                           Date Drawn                             Result  Hepatitis B Surface Antigen 21     neg                     Hepatitis B Surface Antibody 21 neg        Hepatitis B Core Antibody 21 neg          How was Hepatitis Status verified: epic     Was a copy of the labs you documented provided to facility for the patient's chart: epic    Hemodialysis orders verified: yes    Access Within normal limits ( I.e. s/s of infection,...): yes     Pre-Assessment completed: yes    Pre-dialysis report received from: Yeimy Kilpatrick                      Time: 0900

## 2021-12-29 NOTE — PROGRESS NOTES
PC initiated for ARU with [facility] via [address] [place] [address] with Case Jones [id number]. Delores Loving, notified.

## 2021-12-29 NOTE — PLAN OF CARE
Problem: Falls - Risk of:  Goal: Will remain free from falls  Description: Will remain free from falls  12/29/2021 1834 by Marce Noyola RN  Outcome: Ongoing  Note: Patient remains free of incidence/ injury. Bed remains in low position. Side rails up x2. Problem: Pain:  Goal: Control of acute pain  Description: Control of acute pain  12/29/2021 1834 by Marce Noyola RN  Outcome: Ongoing  Note: Patient expresses relief following administration of prn pain medication. Problem: Skin Integrity:  Goal: Will show no infection signs and symptoms  Description: Will show no infection signs and symptoms  12/29/2021 1834 by Marce Noyola RN  Outcome: Ongoing  Note: Patient displays no new signs of infection during this shift. Problem: Skin Integrity:  Goal: Absence of new skin breakdown  Description: Absence of new skin breakdown  12/29/2021 1834 by Marce Noyola RN  Outcome: Ongoing  Note: No new occurrence of skin breakdown noted during this shift.

## 2021-12-29 NOTE — PROGRESS NOTES
Department of Internal Medicine  Nephrology Natali Luis MD   Progress Note    Reason for consultation: Management of hemodialysis dependent end-stage renal disease. Consulting physician: Yasmin Mg MD    Interval history:   Patient was seen and examined today, had dialysis today no acute events noted patient tolerated dialysis well she is awake and responsive. She is not in acute respiratory distress. Blood pressure stable    History of present illness: This is a 59 y.o. female with a significant past medical history of Chronic atrial fibrillation [on Eliquis], Type 2 diabetes mellitus, essential systemic hypertension, coronary artery disease [s/p CABG in 2004 and PTCA in 2019], heart failure with preserved ejection fraction [LVEF 55%] and end-stage renal disease secondary to diabetic and hypertensive nephropathy [on routine hemodialysis on a Monday/Wednesday/Friday schedule at [address] [place] [address] dialysis unit on [facility] under the care of Dr. Zachary Amezcua since August 2001 using IJ tunneled dialysis catheter], who presented to the hospital via EMS for evaluation of worsening low back pain rated 10/10. She was seen and examined this morning and she remains quite lethargic and stuporous and difficult to arouse. She however is not in acute respiratory distress.     Scheduled Meds:   tiZANidine  2 mg Oral TID    lidocaine  1 patch TransDERmal Daily    aspirin  81 mg Oral Daily    atorvastatin  80 mg Oral Daily    busPIRone  7.5 mg Oral TID    carvedilol  6.25 mg Oral BID    docusate sodium  100 mg Oral BID    apixaban  5 mg Oral BID    febuxostat  40 mg Oral Daily    furosemide  80 mg Oral BID    gabapentin  300 mg Oral BID    insulin glargine  73 Units SubCUTAneous BID    pantoprazole  40 mg Oral QAM AC    polyethylene glycol  17 g Oral Daily    ranolazine  1,000 mg Oral BID    tamsulosin  0.4 mg Oral Daily    traZODone  75 mg Oral Nightly    sodium chloride flush  5-40 mL IntraVENous 2 times per day    insulin lispro  0-18 Units SubCUTAneous TID     insulin lispro  0-9 Units SubCUTAneous Nightly     Continuous Infusions:   dextrose      sodium chloride       PRN Meds:.tiZANidine, senna, glucose, dextrose, glucagon (rDNA), dextrose, sodium chloride flush, sodium chloride, ondansetron **OR** ondansetron, polyethylene glycol, acetaminophen **OR** acetaminophen, HYDROcodone 5 mg - acetaminophen, sodium citrate, sodium citrate    Physical Exam:    VITALS:  BP (!) 130/54   Pulse 73   Temp 97.3 °F (36.3 °C) (Oral)   Resp 16   Ht 5' 5\" (1.651 m)   Wt 261 lb 0.4 oz (118.4 kg)   SpO2 97%   BMI 43.44 kg/m²   24HR INTAKE/OUTPUT:    No intake or output data in the 24 hours ending 12/29/21 1519    Constitutional: slowed mentation and Lethargic and difficult to arouse    Skin: Skin color, texture, turgor normal. No rashes or lesions    Head: Normocephalic, without obvious abnormality, atraumatic     Cardiovascular/Edema: regular rate and rhythm, S1, S2 normal, no murmur, click, rub or gallop    Respiratory: Lungs: clear to auscultation bilaterally    Abdomen: soft, non-tender; bowel sounds normal; no masses,  no organomegaly    Back: symmetric, no curvature. ROM normal. No CVA tenderness. Extremities: edema +    Neuro:  Stuporous and lethargic.     CBC:   Recent Labs     12/29/21  0951   WBC 5.0   HGB 9.5*   *     BMP:    Recent Labs     12/27/21  1003 12/28/21  0634 12/29/21  0951   * 136 132*   K 5.0 4.4 3.9   CL 96* 97* 95*   CO2 23 26 27   BUN 58* 36* 38*   CREATININE 5.16* 3.93* 3.59*   GLUCOSE 266* 108* 176*     Lab Results   Component Value Date    NITRU NEGATIVE 11/14/2021    COLORU Yellow 11/14/2021    PHUR 5.0 11/14/2021    WBCUA 5 TO 10 11/14/2021    RBCUA 0 TO 2 11/14/2021    MUCUS NOT REPORTED 11/14/2021    TRICHOMONAS NOT REPORTED 11/14/2021    YEAST FEW 11/14/2021    BACTERIA MANY 11/14/2021    SPECGRAV 1.014 11/14/2021    LEUKOCYTESUR TRACE 11/14/2021    UROBILINOGEN Normal 11/14/2021    BILIRUBINUR NEGATIVE 11/14/2021    GLUCOSEU 3+ 11/14/2021    KETUA NEGATIVE 11/14/2021    AMORPHOUS NOT REPORTED 11/14/2021     Urine Sodium:     Lab Results   Component Value Date    JASON 47 11/14/2021     Urine Chloride:    Lab Results   Component Value Date    CLUR 26 11/14/2021     Urine Protein:     Lab Results   Component Value Date    TPU 20 11/12/2021     Urine Creatinine:     Lab Results   Component Value Date    LABCREA 72.0 11/14/2021     IMPRESSION/RECOMMENDATIONS:      1.  End-stage renal disease - we will maintain MWF hemodialysis schedule. Vascular access is IJ tunneled hemodialysis catheter. Renal diet,i.e 2-gram sodium,2-gram potassium,1500 ml fluid restriction,1-gram phosphorus, 1800 KCal and 1.2 gram protein per day. 2.  Altered mental status - rule out narcotic analgesia overdosage. blood glucose is within normal range. Sepsis will be ruled out also. Mental status is back to baseline. 3.   Normocytic anemia of chronic kidney disease - hemoglobin 10.4 g/dL is within target range. 4.  Systemic hypertension - blood pressure control is adequate. Plan    Continue hemodialysis Monday Wednesday Friday hemodialysis today as per orders    No objection on transfer to acute rehab      Prognosis is guarded.     Radha Zuñiga MD   Attending Nephrologist  12/29/2021 3:19 PM

## 2021-12-29 NOTE — FLOWSHEET NOTE
12/29/21 1202   Encounter Summary   Services provided to: Patient   Referral/Consult From: Nhi   Continue Visiting   (12/29/21V)   Volunteer Visit Yes   Complexity of Encounter Low   Length of Encounter 15 minutes   Spiritual/Orthodoxy   Type Spiritual support   Intervention Prayer

## 2021-12-29 NOTE — PROGRESS NOTES
BONNIE spoke with pt and pt's sister, Kobe Hong about their concerns regarding pt's insurance change. Pt currently has [employer]. Starting January, pt's insurance will be [employer]. BONNIE left voicemail for Jennifer in ARU to update about insurance change.

## 2021-12-30 ENCOUNTER — APPOINTMENT (OUTPATIENT)
Dept: PHYSICAL THERAPY | Age: 61
End: 2021-12-30
Payer: COMMERCIAL

## 2021-12-30 LAB
-: NORMAL
ANION GAP SERPL CALCULATED.3IONS-SCNC: 19 MMOL/L (ref 9–17)
BUN BLDV-MCNC: 27 MG/DL (ref 8–23)
BUN/CREAT BLD: ABNORMAL (ref 9–20)
CALCIUM SERPL-MCNC: 9.1 MG/DL (ref 8.6–10.4)
CHLORIDE BLD-SCNC: 96 MMOL/L (ref 98–107)
CO2: 18 MMOL/L (ref 20–31)
CREAT SERPL-MCNC: 3.12 MG/DL (ref 0.5–0.9)
GFR AFRICAN AMERICAN: 18 ML/MIN
GFR NON-AFRICAN AMERICAN: 15 ML/MIN
GFR SERPL CREATININE-BSD FRML MDRD: ABNORMAL ML/MIN/{1.73_M2}
GFR SERPL CREATININE-BSD FRML MDRD: ABNORMAL ML/MIN/{1.73_M2}
GLUCOSE BLD-MCNC: 111 MG/DL (ref 65–105)
GLUCOSE BLD-MCNC: 119 MG/DL (ref 65–105)
GLUCOSE BLD-MCNC: 126 MG/DL (ref 65–105)
GLUCOSE BLD-MCNC: 128 MG/DL (ref 65–105)
GLUCOSE BLD-MCNC: 131 MG/DL (ref 70–99)
GLUCOSE BLD-MCNC: 142 MG/DL (ref 65–105)
GLUCOSE BLD-MCNC: 185 MG/DL (ref 65–105)
GLUCOSE BLD-MCNC: 202 MG/DL (ref 65–105)
GLUCOSE BLD-MCNC: 239 MG/DL (ref 65–105)
GLUCOSE BLD-MCNC: 256 MG/DL (ref 65–105)
POTASSIUM SERPL-SCNC: 3.8 MMOL/L (ref 3.7–5.3)
REASON FOR REJECTION: NORMAL
SODIUM BLD-SCNC: 133 MMOL/L (ref 135–144)
ZZ NTE CLEAN UP: ORDERED TEST: NORMAL
ZZ NTE WITH NAME CLEAN UP: SPECIMEN SOURCE: NORMAL

## 2021-12-30 PROCEDURE — 80048 BASIC METABOLIC PNL TOTAL CA: CPT

## 2021-12-30 PROCEDURE — 6370000000 HC RX 637 (ALT 250 FOR IP): Performed by: PSYCHIATRY & NEUROLOGY

## 2021-12-30 PROCEDURE — G0378 HOSPITAL OBSERVATION PER HR: HCPCS

## 2021-12-30 PROCEDURE — 6370000000 HC RX 637 (ALT 250 FOR IP): Performed by: STUDENT IN AN ORGANIZED HEALTH CARE EDUCATION/TRAINING PROGRAM

## 2021-12-30 PROCEDURE — 97116 GAIT TRAINING THERAPY: CPT

## 2021-12-30 PROCEDURE — 82947 ASSAY GLUCOSE BLOOD QUANT: CPT

## 2021-12-30 PROCEDURE — 6370000000 HC RX 637 (ALT 250 FOR IP): Performed by: FAMILY MEDICINE

## 2021-12-30 PROCEDURE — 97530 THERAPEUTIC ACTIVITIES: CPT

## 2021-12-30 PROCEDURE — 36415 COLL VENOUS BLD VENIPUNCTURE: CPT

## 2021-12-30 PROCEDURE — 2580000003 HC RX 258: Performed by: FAMILY MEDICINE

## 2021-12-30 RX ADMIN — BUSPIRONE HYDROCHLORIDE 7.5 MG: 5 TABLET ORAL at 09:33

## 2021-12-30 RX ADMIN — TIZANIDINE 2 MG: 2 TABLET ORAL at 16:09

## 2021-12-30 RX ADMIN — FUROSEMIDE 80 MG: 40 TABLET ORAL at 16:09

## 2021-12-30 RX ADMIN — APIXABAN 5 MG: 5 TABLET, FILM COATED ORAL at 21:53

## 2021-12-30 RX ADMIN — POLYETHYLENE GLYCOL 3350 17 G: 17 POWDER, FOR SOLUTION ORAL at 10:54

## 2021-12-30 RX ADMIN — DOCUSATE SODIUM 100 MG: 100 CAPSULE ORAL at 21:52

## 2021-12-30 RX ADMIN — INSULIN GLARGINE 73 UNITS: 100 INJECTION, SOLUTION SUBCUTANEOUS at 21:56

## 2021-12-30 RX ADMIN — ASPIRIN 81 MG: 81 TABLET, CHEWABLE ORAL at 09:33

## 2021-12-30 RX ADMIN — BUSPIRONE HYDROCHLORIDE 7.5 MG: 5 TABLET ORAL at 16:08

## 2021-12-30 RX ADMIN — ATORVASTATIN CALCIUM 80 MG: 80 TABLET, FILM COATED ORAL at 09:34

## 2021-12-30 RX ADMIN — GABAPENTIN 300 MG: 300 CAPSULE ORAL at 09:34

## 2021-12-30 RX ADMIN — HYDROCODONE BITARTRATE AND ACETAMINOPHEN 1 TABLET: 5; 325 TABLET ORAL at 09:34

## 2021-12-30 RX ADMIN — SODIUM CHLORIDE, PRESERVATIVE FREE 10 ML: 5 INJECTION INTRAVENOUS at 10:57

## 2021-12-30 RX ADMIN — INSULIN LISPRO 3 UNITS: 100 INJECTION, SOLUTION INTRAVENOUS; SUBCUTANEOUS at 21:56

## 2021-12-30 RX ADMIN — TIZANIDINE 2 MG: 2 TABLET ORAL at 09:34

## 2021-12-30 RX ADMIN — HYDROCODONE BITARTRATE AND ACETAMINOPHEN 1 TABLET: 5; 325 TABLET ORAL at 22:38

## 2021-12-30 RX ADMIN — GABAPENTIN 300 MG: 300 CAPSULE ORAL at 21:52

## 2021-12-30 RX ADMIN — BUSPIRONE HYDROCHLORIDE 7.5 MG: 5 TABLET ORAL at 21:52

## 2021-12-30 RX ADMIN — TIZANIDINE 4 MG: 2 TABLET ORAL at 01:23

## 2021-12-30 RX ADMIN — INSULIN GLARGINE 73 UNITS: 100 INJECTION, SOLUTION SUBCUTANEOUS at 09:33

## 2021-12-30 RX ADMIN — RANOLAZINE 1000 MG: 500 TABLET, FILM COATED, EXTENDED RELEASE ORAL at 09:33

## 2021-12-30 RX ADMIN — HYDROCODONE BITARTRATE AND ACETAMINOPHEN 1 TABLET: 5; 325 TABLET ORAL at 16:12

## 2021-12-30 RX ADMIN — INSULIN LISPRO 6 UNITS: 100 INJECTION, SOLUTION INTRAVENOUS; SUBCUTANEOUS at 17:55

## 2021-12-30 RX ADMIN — PANTOPRAZOLE SODIUM 40 MG: 40 TABLET, DELAYED RELEASE ORAL at 06:38

## 2021-12-30 RX ADMIN — FUROSEMIDE 80 MG: 40 TABLET ORAL at 09:34

## 2021-12-30 RX ADMIN — TAMSULOSIN HYDROCHLORIDE 0.4 MG: 0.4 CAPSULE ORAL at 09:33

## 2021-12-30 RX ADMIN — CARVEDILOL 6.25 MG: 6.25 TABLET, FILM COATED ORAL at 09:34

## 2021-12-30 RX ADMIN — TRAZODONE HYDROCHLORIDE 75 MG: 50 TABLET ORAL at 21:52

## 2021-12-30 RX ADMIN — TIZANIDINE 2 MG: 2 TABLET ORAL at 21:53

## 2021-12-30 RX ADMIN — RANOLAZINE 1000 MG: 500 TABLET, FILM COATED, EXTENDED RELEASE ORAL at 21:52

## 2021-12-30 RX ADMIN — SODIUM CHLORIDE, PRESERVATIVE FREE 10 ML: 5 INJECTION INTRAVENOUS at 22:47

## 2021-12-30 RX ADMIN — DOCUSATE SODIUM 100 MG: 100 CAPSULE ORAL at 09:33

## 2021-12-30 RX ADMIN — FEBUXOSTAT 40 MG: 40 TABLET, FILM COATED ORAL at 10:54

## 2021-12-30 RX ADMIN — APIXABAN 5 MG: 5 TABLET, FILM COATED ORAL at 09:34

## 2021-12-30 ASSESSMENT — PAIN SCALES - GENERAL
PAINLEVEL_OUTOF10: 5
PAINLEVEL_OUTOF10: 4
PAINLEVEL_OUTOF10: 6
PAINLEVEL_OUTOF10: 5
PAINLEVEL_OUTOF10: 5
PAINLEVEL_OUTOF10: 7

## 2021-12-30 ASSESSMENT — PAIN DESCRIPTION - PAIN TYPE
TYPE: CHRONIC PAIN
TYPE: CHRONIC PAIN

## 2021-12-30 ASSESSMENT — PAIN DESCRIPTION - ORIENTATION: ORIENTATION: MID;LOWER

## 2021-12-30 ASSESSMENT — PAIN DESCRIPTION - LOCATION
LOCATION: BACK
LOCATION: BACK

## 2021-12-30 NOTE — PROGRESS NOTES
Mundo Bridges, notified that writer huy'd notification per [employer] via [employer], Ron physician reviewer, rehab admission is denied. Based on current level of function and current medical needs, there is no documented need for physician oversight three times per week or 24-hour availability of rehab RN. Care can be delivered at a lower level of care such as skilled nursing facility. Also, unsure of need for medical oversight and unsure that member could tolerate 3 hours of therapy a day due to dizziness and fatigue. \" P2P was offered but pt's insurance eligibility changes from O to [employer] on January 1st so pt would not be eligible when P2P could be completed (per Hailey @ [facility]). New precert would have to be initiated with Muna Hayes on or after January 1st.  Jane states SNF placement will be pursued at this time.

## 2021-12-30 NOTE — FLOWSHEET NOTE
12/30/21 1502   Encounter Summary   Services provided to: Patient   Referral/Consult From: Nhi   Continue Visiting   (12/30/21V)   Volunteer Visit Yes   Complexity of Encounter Low   Length of Encounter 15 minutes   Spiritual/Christianity   Type Spiritual support   Intervention Communion   Sacraments   Communion Patient received communion

## 2021-12-30 NOTE — PROGRESS NOTES
Progress Note  Date:2021       Room:[facility]  Patient Stephanie Eng     YOB: 1960     Age:61 y.o. Subjective    Subjective:  Symptoms:  (Patient sleeping; not awakened. ). Diet:  Adequate intake. Activity level: Impaired due to weakness. Pain:  She reports no pain. Review of Systems  Objective         Vitals Last 24 Hours:  TEMPERATURE:  Temp  Av.2 °F (36.2 °C)  Min: 96.8 °F (36 °C)  Max: 98 °F (36.7 °C)  RESPIRATIONS RANGE: Resp  Av  Min: 16  Max: 18  PULSE OXIMETRY RANGE: SpO2  Av.7 %  Min: 93 %  Max: 97 %  PULSE RANGE: Pulse  Av.2  Min: 55  Max: 73  BLOOD PRESSURE RANGE: Systolic (72WZC), UAS:323 , Min:113 , DR   ; Diastolic (74YHU), PJF:00, Min:35, Max:68    I/O (24Hr): Intake/Output Summary (Last 24 hours) at 2021 0834  Last data filed at 2021 1255  Gross per 24 hour   Intake 500 ml   Output 2500 ml   Net -2000 ml     Objective  Labs/Imaging/Diagnostics    Labs:  CBC:  Recent Labs     21  0951   WBC 5.0   RBC 2.77*   HGB 9.5*   HCT 27.0*   MCV 97.7   RDW 14.8   *     CHEMISTRIES:  Recent Labs     21  0634 21  0951 21  0710    132* 133*   K 4.4 3.9 3.8   CL 97* 95* 96*   CO2 26 27 18*   BUN 36* 38* 27*   CREATININE 3.93* 3.59* 3.12*   GLUCOSE 108* 176* 131*     PT/INR:No results for input(s): PROTIME, INR in the last 72 hours. APTT:No results for input(s): APTT in the last 72 hours. LIVER PROFILE:No results for input(s): AST, ALT, BILIDIR, BILITOT, ALKPHOS in the last 72 hours.     Imaging Last 24 Hours:  MRI CERVICAL SPINE WO CONTRAST    Result Date: 2021  EXAMINATION: MRI OF THE CERVICAL SPINE WITHOUT CONTRAST 2021 4:43 pm TECHNIQUE: Multiplanar multisequence MRI of the cervical spine was performed without the administration of intravenous contrast. COMPARISON: 11/15/2021 HISTORY: ORDERING SYSTEM PROVIDED HISTORY: leg weakness TECHNOLOGIST PROVIDED HISTORY: leg weakness Reason for Exam: leg weakness Additional signs and symptoms: patient complains of lower back pain FINDINGS: BONES/ALIGNMENT: There is straightening of the normal cervical lordosis. No significant listhesis. Prior anterior fixation from C5-C7. Prior C6 corpectomy with interbody graft. There is associated hardware artifact. No aggressive marrow signal abnormality. SPINAL CORD: No abnormal cord signal is seen. SOFT TISSUES: No paraspinal mass identified. 1.6 cm right thyroid nodule. C2-C3: There is no significant disc protrusion, spinal canal stenosis or neural foraminal narrowing. C3-C4: There is no significant disc protrusion, spinal canal stenosis or neural foraminal narrowing. C4-C5: No significant spinal canal stenosis. Uncovertebral facet DJD with mild to moderate bilateral neural foraminal stenosis. C5-C6: Postoperative level without significant spinal canal stenosis. Uncovertebral facet DJD with moderate to severe bilateral neural foraminal stenosis. C6-C7: Postoperative level without significant spinal canal or neural foraminal stenosis. C7-T1: There is no significant disc protrusion, spinal canal stenosis or neural foraminal narrowing. 1. Postoperative changes from C5-C7 without significant spinal canal stenosis. 2. Neural foraminal narrowing as above. 3. 1.6 cm right thyroid nodule. Ultrasound is recommended for further characterization. RECOMMENDATIONS: Managing Incidental Thyroid Nodule Detected at CT or MRI or US 1. Further evaluation by thyroid Ultrasound recommended for these incidental nodules: Patient Age 25 years or less - Nodule of any size Patient Age 21-27 years old - Nodule 1 cm in size or greater Patient Age 28 years or more - Nodule 1.5 cm in size or greater 2.  Follow up thyroid ultrasound also recommend in these scenarios - Solitary nodule with high risk imaging features (locally invasive nodule or suspicious lymph nodes) - Heterogeneous, enlarged thyroid gland. - Increased uptake on PET 3.  NO further imaging is recommended in the following scenarios - Any nodule not meeting above criteria. - Those patients with limited life expectancy or significant co-morbidities. Note: These recommendations do not apply to pts. w/ increased risk for thyroid cancer or pts. with symptomatic thyroid disease. Reference: Recommendations for f/u of Incidental Thyroid Nodules (ITN) found on CT, MR, NM and Extrathyroidal US are based upon the ACR white paper and [facility] 3-tiered system for managing ITNs:J Am Toy Radiol. 2015 Feb;12(2): 143-50 Unavailable     MRI THORACIC SPINE WO CONTRAST    Result Date: 12/28/2021  EXAMINATION: MRI OF THE THORACIC SPINE WITHOUT CONTRAST  12/28/2021 4:43 pm TECHNIQUE: Multiplanar multisequence MRI of the thoracic spine was performed without the administration of intravenous contrast. COMPARISON: CT thoracic spine done April 6, 2021. HISTORY: ORDERING SYSTEM PROVIDED HISTORY: middle back pain TECHNOLOGIST PROVIDED HISTORY: middle back pain Reason for Exam: middle back pain Additional signs and symptoms: patient complains of lower back pain FINDINGS: BONES/ALIGNMENT: There is normal alignment of the spine. The vertebral body heights are maintained. Diffusely heterogeneous T1 marrow signal suggests osteopenia. No foci of suspicious bone marrow edema. Prior median sternotomy. SPINAL CORD: No abnormal cord signal is seen. SOFT TISSUES: No paraspinal mass identified. DEGENERATIVE CHANGES: Mild multilevel degenerative disc desiccation and height loss. Multilevel marginal osteophytes. No significant spinal canal stenosis or neural foraminal narrowing of the thoracic spine. No acute abnormality in the thoracic spine. Mild multilevel degenerative changes in the thoracic spine without significant spinal canal or neural foraminal narrowing. No abnormal signal in the thoracic spinal cord.  RECOMMENDATIONS: Unavailable     US HEAD NECK SOFT TISSUE THYROID    Result Date: if >= 1 cm; follow-up if 0.5-0.9 cm in 1, 2, 3, 4, and 5 years TR4 (4-6 points):  FNA if >= 1.5 cm; follow-up if 1.0-1.4 cm in 1, 2, 3, and 5 years TR3 (3 points):  FNA if >= 2.5 cm; follow-up if 1.5-2.4 cm in 1, 3, and 5 years TR2 (2 points):  No FNA or follow-up TR1 (0 points):  No FNA or follow-up ACR TI-RADS recommends that no more than two nodules with the highest ACR TI-RADS point total should be biopsied and no more than four nodules should be followed. Unavailable     MRI BRAIN WO CONTRAST    Result Date: 12/28/2021  EXAMINATION: MRI OF THE BRAIN WITHOUT CONTRAST  12/28/2021 4:43 pm TECHNIQUE: Multiplanar multisequence MRI of the brain was performed without the administration of intravenous contrast. COMPARISON: Brain MRI done November 11, 2021. Noncontrast CT head done 12/24/2021. HISTORY: ORDERING SYSTEM PROVIDED HISTORY: encephalopathy TECHNOLOGIST PROVIDED HISTORY: encephalopathy Reason for Exam: encephalopathy Additional signs and symptoms: patient complains of lower back pain FINDINGS: INTRACRANIAL STRUCTURES/VENTRICLES: There is no acute infarct. No mass effect or midline shift. No evidence of an acute intracranial hemorrhage. Mild generalized cerebral and cerebellar volume loss. No hydrocephalus. The sellar/suprasellar regions appear unremarkable. The normal signal voids within the major intracranial vessels appear maintained. The axial FLAIR images demonstrate bilateral periventricular and deep white matter signal hyperintensities, commonly seen in the setting of chronic small vessel ischemic disease. Stable small focal area of encephalomalacia in the left occipital lobe may represent an old infarct. ORBITS: The visualized portion of the orbits demonstrate no acute abnormality. SINUSES: The visualized paranasal sinuses and mastoid air cells demonstrate no acute abnormality. BONES/SOFT TISSUES: The bone marrow signal intensity appears normal. The soft tissues demonstrate no acute abnormality. 1.  No acute intracranial abnormality per unenhanced brain MRI. No acute stroke, intracranial hemorrhage or mass effect. 2.  Mild chronic small vessel ischemic disease. RECOMMENDATIONS: Unavailable     Assessment//Plan           Hospital Problems           Last Modified POA    * (Principal) Acute right-sided thoracic back pain 12/24/2021 Yes    Diabetic polyneuropathy associated with type 2 diabetes mellitus ([facility]) 12/24/2021 Yes    Spinal stenosis of lumbar region with neurogenic claudication 12/24/2021 Yes    Stage 4 chronic kidney disease ([facility]) 12/24/2021 Yes    Type 2 diabetes mellitus with kidney complication, with long-term current use of insulin ([facility]) 12/24/2021 Yes    Essential hypertension 12/24/2021 Yes    Ischemic stroke of frontal lobe ([facility]) 12/24/2021 Yes    Morbid obesity with BMI of 40.0-44.9, adult ([facility]) 12/24/2021 Yes    Generalized weakness 12/26/2021 Yes    Tremor 42/54/3552 Yes    Metabolic encephalopathy 12/51/5410 Yes        Assessment & Plan  Awaiting precertification for ARU. Reviewed thyroid ultrasound - will need biopsy of one thyroid nodule on the left and one on the right. Will need to have Eliquis held before the procedure; unlikely it could be done over the holiday weekend during this admission, so biopsy will need to be arranged as OP or while she is in rehab after Eliquis has been held for as long as IR wants it held before biopsy.          Electronically signed by Jaky Carroll MD on 12/30/21 at 8:34 AM EST

## 2021-12-30 NOTE — PROGRESS NOTES
[facility]    INPATIENT OCCUPATIONAL THERAPY  PROGRESS NOTE  Date: 2021  Patient Name: Tre Johnson      Room: 4760/3775-02  MRN: [id number]    : 1960  (59 y.o.) Gender: female     Discharge Recommendations:  Further Occupational Therapy is recommended upon facility discharge. Equipment Needed: No    Referring Practitioner: Patricia Luque MD  Diagnosis: Acute right-sided thoracic back pain  General  Chart Reviewed: Yes  Patient assessed for rehabilitation services?: Yes  Additional Pertinent Hx: 59 y.o. female with past medical history significant for end-stage renal disease on dialysis, CAD, diabetes mellitus via EMS with reports of back pain. Patient is alert although does not seem to be answering questions appropriately, history is unreliable with multiple stories being reported by patient. Patient reports back pain right-sided with no injury initially and then reports that she was picking something up and did have pain of the right side which happened a week ago. Patient does state that she has had prior incidence of back spasms and back pain. Response to previous treatment: Patient with no complaints from previous session  Family / Caregiver Present: No  Referring Practitioner: Patricia Luque MD  Diagnosis: Acute right-sided thoracic back pain    Restrictions  Restrictions/Precautions: General Precautions,Fall Risk  Required Braces or Orthoses?: No      Subjective  Subjective: Pt resting in bed upon arrival. Pt was agreeable to attempt to get into recliner chair with RW on this date. Comments: OK per RN.  Particial co-treatment with NATALIYA Castellanos   Patient Currently in Pain: Yes  Pain Level: 5  Pain Location: Back        Pain Assessment  Pain Assessment: 0-10  Pain Level: 5  Pain Type: Chronic pain  Pain Location: Back    Objective     Bed mobility  Supine to Sit: Minimal assistance  Sit to Supine: Unable to assess (Pt sitting in recliner chair at end of session)  Scooting: Stand by assistance  Comment: Bed mobility completed with HOB elevated  Balance  Sitting Balance: Stand by assistance  Standing Balance: Minimal assistance (x2)  Standing Balance  Time: 1 minute  Activity: Small steps from EOB to recliner chair  Comment: with RW. Pt completed stand/transfer with min A x 2 with PTA providing stabilization to L knee due. Functional Mobility  Functional - Mobility Device: Rolling Walker  Activity: Other (small steps from bed to recliner chiar)  Assist Level: Minimal assistance (x2)  Functional Mobility Comments: Functional mobility completed with 1 person A supporting LLE. Verbal cues for hand placement and safety. ADL  Feeding: Adaptive utensils (comment); Setup  Grooming: Setup  UE Bathing: Minimal assistance  LE Bathing: Moderate assistance  UE Dressing: Minimal assistance  LE Dressing: Moderate assistance  Toileting: Moderate assistance  Additional Comments: ADL scores based on clinical reasoning and skilled observation unless otherwise noted. Pt currrently limited due to decreased strength, balance, activity, and increased pain impacting safety and independence with self care tasks. Transfers  Sit to stand: 2 Person assistance (Min A x 1 and CGA x 1 at L knee)  Stand to sit: 2 Person assistance (Min A x 1 and CGA x 1 at L knee)  Transfer Comments: Verbal cues for hand placement and safety. Pt educated on pursed lip breathing and relaxation techniques to assist with main management during transfers. Additional Activities: Pt educated on pain management techniques of pursed lip breathing and progressive muscle relaxation to assist with pain management in back. Pt demonstrated good understanding. Assessment  Performance deficits / Impairments: Decreased functional mobility ; Decreased ADL status; Decreased strength;Decreased safe awareness;Decreased endurance;Decreased sensation;Decreased balance  Prognosis: Good  Discharge Recommendations: Continue to assess pending progress  Activity Tolerance: Patient Tolerated treatment well  Safety Devices in place: Yes  Type of devices: Left in bed;Call light within reach; All fall risk precautions in place;Nurse notified             Patient Education:safety with transfers, pain management techniques     Learner:patient  Method: demonstration and explanation       Outcome: demonstrated understanding and needs reinforcement     Plan  Safety Devices  Safety Devices in place: Yes  Type of devices: Left in bed,Call light within reach,All fall risk precautions in place,Nurse notified  Plan  Times per week: 4-5  Times per day: Daily  Current Treatment Recommendations: Strengthening,ROM,Balance Training,Functional Mobility Jose Alfredo Ren Education & Training,Patient/Caregiver Education & Training,Equipment Evaluation, Education, & procurement,Self-Care / ADL      Goals  Short term goals  Time Frame for Short term goals: By discharge  Short term goal 1: Patient will perform BADLs with mod I and good safety  Short term goal 2: Patient will tolerate standing 4+ minutes, with no LOB, mod I and good safety  Short term goal 3: Patient will perform transfers/functional mobility with mod I   Short term goal 4: Patient will V/D at least 3 EC/WS/fall prevention strategies to promote safety with daily routine  Short term goal 5: Patient will actively participate in 15+ minutes of therapeutic activity to promote independence with self care and mobility    OT Individual Minutes  Time In: 0936  Time Out: [id number] Isai Mcgovern  Minutes: 44      Electronically signed by Kendrick Leyva OT on 12/30/21 at 1:14 PM EST

## 2021-12-30 NOTE — PROGRESS NOTES
[address]    Physical Therapy Progress Note    Date: 21  Patient Name: Tre Johnson       Room: 9751/6088-75  MRN: [id number]   Account: [de-identified]   : 1960  (59 y.o.)   Gender: female     Discharge Recommendations   The patient would benefit from an intensive level of therapy after discharge from the facility. They should be able to tolerate 3-hours of Combined OT/PT/ST over 5 days/week or at least 900 minutes of  Combined Therapy over 7 days. Home with assist PRN  Equipment Needed: No    Referring Practitioner: Patricia Luque MD  Diagnosis: Acute right-sided thoracic back pain  Restrictions/Precautions: General Precautions,Fall Risk   Past Medical History:  has a past medical history of Backache, unspecified, CHF (congestive heart failure) ([address] Chronic kidney disease, Coronary atherosclerosis of artery bypass graft, Cramp of limb, Gallstones, Hypertension, Insomnia, Pneumonia, Type II or unspecified type diabetes mellitus with renal manifestations, not stated as uncontrolled(250.40), Type II or unspecified type diabetes mellitus without mention of complication, not stated as uncontrolled, and Unspecified vitamin D deficiency. Past Surgical History:   has a past surgical history that includes Coronary artery bypass graft; Knee arthroscopy; Carpal tunnel release; Breast surgery; Tonsillectomy; Hand surgery; Ankle fracture surgery; Cholecystectomy, open (N/A); IR TUNNELED CVC PLACE WO SQ PORT/PUMP > 5 YEARS (2021); AV fistula creation (2021); and Dialysis fistula creation (Left, 2021). Overall Orientation Status: Within Functional Limits  Restrictions/Precautions  Restrictions/Precautions: General Precautions; Fall Risk  Required Braces or Orthoses?: No    Subjective: Pt reports having increased back pain this morning  Comments: Co-treat with OTR Teacia. Pt is very pleasant and cooperative.     Vital Signs  Patient Currently in Pain: Yes  Pain Assessment: 0-10  Pain Level: 5  Pain Type: Chronic pain  Pain Location: Back  Pain Orientation: Mid;Lower  Non-Pharmaceutical Pain Intervention(s): Ambulation/Increased Activity; Distraction;Repositioned  Response to Pain Intervention: Patient Satisfied                   Bed Mobility  Rolling: Stand by assistance  Supine to Sit: Minimal assistance (due to back pain)  Sit to Supine: Unable to assess (pt left in bedside recliner)  Scooting: Stand by assistance  Comment: HOB elevated to 40 degrees      Transfers:  Sit to Stand: Contact guard assistance;2 Person Assistance (1 assisting with blocking left knee)  Stand to sit: Contact guard assistance;2 Person Assistance  Bed to Chair: Minimal assistance;2 Person Assistance (1 person assisting maintaining left knee)              Ambulation 1  Surface: level tile  Device: Rolling Walker  Assistance: Minimal assistance;2 Person assistance  Quality of Gait: wide ELISABET, unsteady left knee/leg trembling, assist with left knee bracing to prevent buckle  Distance: 5 steps from bed to recliner  Comments: Left knee blocked to prevent buckling, pt able to transfer from bed to bedside chair without LOB        Stairs/Curb  Stairs?: No                                                     Posture: Fair  Sitting - Static: Fair (sitting EOB uisng UE's for support)  Sitting - Dynamic: Fair;- (Jose)  Standing - Static: Poor (Standing with RW Assist x2)  Standing - Dynamic: Poor     Other exercises?: Yes  Other exercises 1: bed mobility x1  Other exercises 2: Dangle EOB 5-10min.   Other exercises 3: Seated EOB LAQ x5reps x2 sets  Other exercises 4: Education on safe technique for use of RW, pursed lip breathing, and body positioning           Activity Tolerance: Patient Tolerated treatment well;Patient limited by endurance  PT Equipment Recommendations  Equipment Needed: No       Assessment  Activity Tolerance: Patient Tolerated treatment well;Patient limited by endurance   Body structures, Functions, Activity limitations: Decreased functional mobility ; Decreased strength; Increased pain  Assessment: Impaired mobility due to pain and safety issues of decreased balance and strength  Discharge Recommendations: Home with assist PRN     Type of devices: All fall risk precautions in place;Call light within reach; Chair alarm in place;Gait belt;Left in chair     Plan  Times per week: 5-6x/wk  Current Treatment Recommendations: Caryn Iyer Mobility Training,Transfer Training,Gait Training    Patient Education  New Education Provided:  Plan of Care  Learner:patient  Method: demonstration and explanation       Outcome: needs reinforcement     Goals  Short term goals  Time Frame for Short term goals: 2-3 days  Short term goal 1: mod-I bed mobility  Short term goal 2: mod-I transfers  Short term goal 3: mod-I gait with RW x 50-100ft    PT Individual Minutes  Time In: 9078  Time Out: 58013 Isai Mcgovern  Minutes: 30    Electronically signed by Nilesh Yuan PTA on 12/30/21 at 12:57 PM EST

## 2021-12-30 NOTE — PROGRESS NOTES
Department of Internal Medicine  Nephrology [facility], MD   Progress Note    Reason for consultation: Management of hemodialysis dependent end-stage renal disease. Consulting physician: Rick Jordan MD    Interval history:   Patient was seen and examined today, had dialysis yesterday, no acute events noted c/o back pain. She is not in acute respiratory distress. Blood pressure stable    History of present illness: This is a 59 y.o. female with a significant past medical history of Chronic atrial fibrillation [on Eliquis], Type 2 diabetes mellitus, essential systemic hypertension, coronary artery disease [s/p CABG in 2004 and PTCA in 2019], heart failure with preserved ejection fraction [LVEF 55%] and end-stage renal disease secondary to diabetic and hypertensive nephropathy [on routine hemodialysis on a Monday/Wednesday/Friday schedule at [address] dialysis unit on [facility] under the care of Dr. Della Garcia since August 2001 using IJ tunneled dialysis catheter], who presented to the hospital via EMS for evaluation of worsening low back pain rated 10/10. She was seen and examined this morning and she remains quite lethargic and stuporous and difficult to arouse. She however is not in acute respiratory distress.     Scheduled Meds:   tiZANidine  2 mg Oral TID    lidocaine  1 patch TransDERmal Daily    aspirin  81 mg Oral Daily    atorvastatin  80 mg Oral Daily    busPIRone  7.5 mg Oral TID    carvedilol  6.25 mg Oral BID    docusate sodium  100 mg Oral BID    apixaban  5 mg Oral BID    febuxostat  40 mg Oral Daily    furosemide  80 mg Oral BID    gabapentin  300 mg Oral BID    insulin glargine  73 Units SubCUTAneous BID    pantoprazole  40 mg Oral QAM AC    polyethylene glycol  17 g Oral Daily    ranolazine  1,000 mg Oral BID    tamsulosin  0.4 mg Oral Daily    traZODone  75 mg Oral Nightly    sodium chloride flush  5-40 mL IntraVENous 2 times per day    insulin lispro  0-18 Units SubCUTAneous TID WC    insulin lispro  0-9 Units SubCUTAneous Nightly     Continuous Infusions:   dextrose      sodium chloride       PRN Meds:.tiZANidine, senna, glucose, dextrose, glucagon (rDNA), dextrose, sodium chloride flush, sodium chloride, ondansetron **OR** ondansetron, polyethylene glycol, acetaminophen **OR** acetaminophen, HYDROcodone 5 mg - acetaminophen, sodium citrate, sodium citrate    Physical Exam:    VITALS:  /68   Pulse 55   Temp 98 °F (36.7 °C) (Oral)   Resp 18   Ht 5' 5\" (1.651 m)   Wt 253 lb 1.4 oz (114.8 kg)   SpO2 94%   BMI 42.12 kg/m²   24HR INTAKE/OUTPUT:      Intake/Output Summary (Last 24 hours) at 12/30/2021 1351  Last data filed at 12/30/2021 1057  Gross per 24 hour   Intake 10 ml   Output --   Net 10 ml       Constitutional: slowed mentation and Lethargic and difficult to arouse    Skin: Skin color, texture, turgor normal. No rashes or lesions    Head: Normocephalic, without obvious abnormality, atraumatic     Cardiovascular/Edema: regular rate and rhythm, S1, S2 normal, no murmur, click, rub or gallop    Respiratory: Lungs: clear to auscultation bilaterally    Abdomen: soft, non-tender; bowel sounds normal; no masses,  no organomegaly    Back: symmetric, no curvature. ROM normal. No CVA tenderness. Extremities: edema +    Neuro:  Stuporous and lethargic.     CBC:   Recent Labs     12/29/21  0951   WBC 5.0   HGB 9.5*   *     BMP:    Recent Labs     12/28/21  0634 12/29/21  0951 12/30/21  0710    132* 133*   K 4.4 3.9 3.8   CL 97* 95* 96*   CO2 26 27 18*   BUN 36* 38* 27*   CREATININE 3.93* 3.59* 3.12*   GLUCOSE 108* 176* 131*     Lab Results   Component Value Date    NITRU NEGATIVE 11/14/2021    COLORU Yellow 11/14/2021    PHUR 5.0 11/14/2021    WBCUA 5 TO 10 11/14/2021    RBCUA 0 TO 2 11/14/2021    MUCUS NOT REPORTED 11/14/2021    TRICHOMONAS NOT REPORTED 11/14/2021    YEAST FEW 11/14/2021    BACTERIA MANY 11/14/2021    SPECGRAV 1.014 11/14/2021 LEUKOCYTESUR TRACE 11/14/2021    UROBILINOGEN Normal 11/14/2021    BILIRUBINUR NEGATIVE 11/14/2021    GLUCOSEU 3+ 11/14/2021    KETUA NEGATIVE 11/14/2021    AMORPHOUS NOT REPORTED 11/14/2021     Urine Sodium:     Lab Results   Component Value Date    JASON 47 11/14/2021     Urine Chloride:    Lab Results   Component Value Date    CLUR 26 11/14/2021     Urine Protein:     Lab Results   Component Value Date    TPU 20 11/12/2021     Urine Creatinine:     Lab Results   Component Value Date    LABCREA 72.0 11/14/2021     IMPRESSION/RECOMMENDATIONS:      1.  End-stage renal disease - we will maintain MWF hemodialysis schedule. Vascular access is IJ tunneled hemodialysis catheter. Renal diet,i.e 2-gram sodium,2-gram potassium,1500 ml fluid restriction,1-gram phosphorus, 1800 KCal and 1.2 gram protein per day. 2.  Altered mental status - rule out narcotic analgesia overdosage. blood glucose is within normal range. Sepsis will be ruled out also. Mental status is back to baseline. 3.   Normocytic anemia of chronic kidney disease - hemoglobin 10.4 g/dL is within target range. 4.  Systemic hypertension - blood pressure control is adequate. Plan    Continue hemodialysis Monday Wednesday Friday hemodialysis tomorrow    No objection on transfer to acute rehab      Prognosis is guarded.     Boo Leyva MD   Attending Nephrologist  12/30/2021 1:51 PM

## 2021-12-30 NOTE — PLAN OF CARE
Problem: Falls - Risk of:  Goal: Will remain free from falls  Description: Will remain free from falls  12/30/2021 0452 by Shalom New RN  Outcome: Ongoing  12/29/2021 1834 by Maricruz Douglas RN  Outcome: Ongoing  Note: Patient remains free of incidence/ injury. Bed remains in low position. Side rails up x2. Goal: Absence of physical injury  Description: Absence of physical injury  Outcome: Ongoing     Problem: Pain:  Goal: Pain level will decrease  Description: Pain level will decrease  Outcome: Ongoing  Goal: Control of acute pain  Description: Control of acute pain  12/30/2021 0452 by Shalom New RN  Outcome: Ongoing  12/29/2021 1834 by Maricruz Douglas RN  Outcome: Ongoing  Note: Patient expresses relief following administration of prn pain medication. Problem: Skin Integrity:  Goal: Will show no infection signs and symptoms  Description: Will show no infection signs and symptoms  12/30/2021 0452 by Shalom New RN  Outcome: Ongoing  12/29/2021 1834 by Maricruz Douglas RN  Outcome: Ongoing  Note: Patient displays no new signs of infection during this shift. Goal: Absence of new skin breakdown  Description: Absence of new skin breakdown  12/30/2021 0452 by Shalom New RN  Outcome: Ongoing  12/29/2021 1834 by Maricruz Douglas RN  Outcome: Ongoing  Note: No new occurrence of skin breakdown noted during this shift.

## 2021-12-31 LAB
-: NORMAL
ANION GAP SERPL CALCULATED.3IONS-SCNC: 11 MMOL/L (ref 9–17)
BETA-HYDROXYBUTYRATE: 0.09 MMOL/L (ref 0.02–0.27)
BUN BLDV-MCNC: 22 MG/DL (ref 8–23)
BUN/CREAT BLD: ABNORMAL (ref 9–20)
CALCIUM SERPL-MCNC: 8.6 MG/DL (ref 8.6–10.4)
CHLORIDE BLD-SCNC: 93 MMOL/L (ref 98–107)
CO2: 27 MMOL/L (ref 20–31)
CREAT SERPL-MCNC: 2.52 MG/DL (ref 0.5–0.9)
EKG ATRIAL RATE: 63 BPM
EKG P AXIS: 45 DEGREES
EKG P-R INTERVAL: 202 MS
EKG Q-T INTERVAL: 460 MS
EKG QRS DURATION: 110 MS
EKG QTC CALCULATION (BAZETT): 470 MS
EKG R AXIS: 25 DEGREES
EKG T AXIS: 27 DEGREES
EKG VENTRICULAR RATE: 63 BPM
GFR AFRICAN AMERICAN: 23 ML/MIN
GFR NON-AFRICAN AMERICAN: 19 ML/MIN
GFR SERPL CREATININE-BSD FRML MDRD: ABNORMAL ML/MIN/{1.73_M2}
GFR SERPL CREATININE-BSD FRML MDRD: ABNORMAL ML/MIN/{1.73_M2}
GLUCOSE BLD-MCNC: 172 MG/DL (ref 70–99)
MAGNESIUM: 2 MG/DL (ref 1.6–2.6)
POTASSIUM SERPL-SCNC: 3.5 MMOL/L (ref 3.7–5.3)
REASON FOR REJECTION: NORMAL
SODIUM BLD-SCNC: 131 MMOL/L (ref 135–144)
ZZ NTE CLEAN UP: ORDERED TEST: NORMAL
ZZ NTE WITH NAME CLEAN UP: SPECIMEN SOURCE: NORMAL

## 2021-12-31 PROCEDURE — G0378 HOSPITAL OBSERVATION PER HR: HCPCS

## 2021-12-31 PROCEDURE — 6370000000 HC RX 637 (ALT 250 FOR IP): Performed by: INTERNAL MEDICINE

## 2021-12-31 PROCEDURE — 6360000002 HC RX W HCPCS: Performed by: INTERNAL MEDICINE

## 2021-12-31 PROCEDURE — 97116 GAIT TRAINING THERAPY: CPT

## 2021-12-31 PROCEDURE — 6370000000 HC RX 637 (ALT 250 FOR IP): Performed by: FAMILY MEDICINE

## 2021-12-31 PROCEDURE — 93010 ELECTROCARDIOGRAM REPORT: CPT | Performed by: INTERNAL MEDICINE

## 2021-12-31 PROCEDURE — 96372 THER/PROPH/DIAG INJ SC/IM: CPT

## 2021-12-31 PROCEDURE — 82947 ASSAY GLUCOSE BLOOD QUANT: CPT

## 2021-12-31 PROCEDURE — 83735 ASSAY OF MAGNESIUM: CPT

## 2021-12-31 PROCEDURE — 6370000000 HC RX 637 (ALT 250 FOR IP): Performed by: PSYCHIATRY & NEUROLOGY

## 2021-12-31 PROCEDURE — 6370000000 HC RX 637 (ALT 250 FOR IP): Performed by: STUDENT IN AN ORGANIZED HEALTH CARE EDUCATION/TRAINING PROGRAM

## 2021-12-31 PROCEDURE — 82010 KETONE BODYS QUAN: CPT

## 2021-12-31 PROCEDURE — 97110 THERAPEUTIC EXERCISES: CPT

## 2021-12-31 PROCEDURE — 80048 BASIC METABOLIC PNL TOTAL CA: CPT

## 2021-12-31 PROCEDURE — 36415 COLL VENOUS BLD VENIPUNCTURE: CPT

## 2021-12-31 PROCEDURE — 2580000003 HC RX 258: Performed by: FAMILY MEDICINE

## 2021-12-31 PROCEDURE — 90935 HEMODIALYSIS ONE EVALUATION: CPT

## 2021-12-31 RX ORDER — CARVEDILOL 3.12 MG/1
3.12 TABLET ORAL 2 TIMES DAILY WITH MEALS
Status: DISCONTINUED | OUTPATIENT
Start: 2021-12-31 | End: 2022-01-13 | Stop reason: HOSPADM

## 2021-12-31 RX ORDER — SODIUM BICARBONATE 650 MG/1
650 TABLET ORAL 2 TIMES DAILY
Status: DISCONTINUED | OUTPATIENT
Start: 2021-12-31 | End: 2022-01-13 | Stop reason: HOSPADM

## 2021-12-31 RX ADMIN — TAMSULOSIN HYDROCHLORIDE 0.4 MG: 0.4 CAPSULE ORAL at 09:00

## 2021-12-31 RX ADMIN — RANOLAZINE 1000 MG: 500 TABLET, FILM COATED, EXTENDED RELEASE ORAL at 09:02

## 2021-12-31 RX ADMIN — FEBUXOSTAT 40 MG: 40 TABLET, FILM COATED ORAL at 15:25

## 2021-12-31 RX ADMIN — TIZANIDINE 2 MG: 2 TABLET ORAL at 15:23

## 2021-12-31 RX ADMIN — BUSPIRONE HYDROCHLORIDE 7.5 MG: 5 TABLET ORAL at 20:50

## 2021-12-31 RX ADMIN — DOCUSATE SODIUM 100 MG: 100 CAPSULE ORAL at 20:52

## 2021-12-31 RX ADMIN — RANOLAZINE 1000 MG: 500 TABLET, FILM COATED, EXTENDED RELEASE ORAL at 20:52

## 2021-12-31 RX ADMIN — FUROSEMIDE 80 MG: 40 TABLET ORAL at 09:03

## 2021-12-31 RX ADMIN — INSULIN GLARGINE 73 UNITS: 100 INJECTION, SOLUTION SUBCUTANEOUS at 09:05

## 2021-12-31 RX ADMIN — SODIUM BICARBONATE 650 MG: 650 TABLET ORAL at 15:29

## 2021-12-31 RX ADMIN — INSULIN GLARGINE 73 UNITS: 100 INJECTION, SOLUTION SUBCUTANEOUS at 20:47

## 2021-12-31 RX ADMIN — HYDROCODONE BITARTRATE AND ACETAMINOPHEN 1 TABLET: 5; 325 TABLET ORAL at 07:29

## 2021-12-31 RX ADMIN — TRAZODONE HYDROCHLORIDE 75 MG: 50 TABLET ORAL at 20:50

## 2021-12-31 RX ADMIN — TIZANIDINE 2 MG: 2 TABLET ORAL at 09:02

## 2021-12-31 RX ADMIN — HYDROCODONE BITARTRATE AND ACETAMINOPHEN 1 TABLET: 5; 325 TABLET ORAL at 21:27

## 2021-12-31 RX ADMIN — SODIUM CHLORIDE, PRESERVATIVE FREE 10 ML: 5 INJECTION INTRAVENOUS at 20:55

## 2021-12-31 RX ADMIN — BUSPIRONE HYDROCHLORIDE 7.5 MG: 5 TABLET ORAL at 09:03

## 2021-12-31 RX ADMIN — GABAPENTIN 300 MG: 300 CAPSULE ORAL at 09:04

## 2021-12-31 RX ADMIN — SODIUM BICARBONATE 650 MG: 650 TABLET ORAL at 20:52

## 2021-12-31 RX ADMIN — SODIUM CHLORIDE, PRESERVATIVE FREE 10 ML: 5 INJECTION INTRAVENOUS at 09:00

## 2021-12-31 RX ADMIN — PANTOPRAZOLE SODIUM 40 MG: 40 TABLET, DELAYED RELEASE ORAL at 06:33

## 2021-12-31 RX ADMIN — APIXABAN 5 MG: 5 TABLET, FILM COATED ORAL at 20:53

## 2021-12-31 RX ADMIN — BUSPIRONE HYDROCHLORIDE 7.5 MG: 5 TABLET ORAL at 15:23

## 2021-12-31 RX ADMIN — POLYETHYLENE GLYCOL 3350 17 G: 17 POWDER, FOR SOLUTION ORAL at 09:04

## 2021-12-31 RX ADMIN — APIXABAN 5 MG: 5 TABLET, FILM COATED ORAL at 09:04

## 2021-12-31 RX ADMIN — ATORVASTATIN CALCIUM 80 MG: 80 TABLET, FILM COATED ORAL at 09:00

## 2021-12-31 RX ADMIN — ASPIRIN 81 MG: 81 TABLET, CHEWABLE ORAL at 09:02

## 2021-12-31 RX ADMIN — EPOETIN ALFA-EPBX 3000 UNITS: 3000 INJECTION, SOLUTION INTRAVENOUS; SUBCUTANEOUS at 16:43

## 2021-12-31 RX ADMIN — CARVEDILOL 6.25 MG: 6.25 TABLET, FILM COATED ORAL at 09:04

## 2021-12-31 RX ADMIN — TIZANIDINE 2 MG: 2 TABLET ORAL at 20:52

## 2021-12-31 RX ADMIN — GABAPENTIN 300 MG: 300 CAPSULE ORAL at 20:52

## 2021-12-31 RX ADMIN — DOCUSATE SODIUM 100 MG: 100 CAPSULE ORAL at 09:02

## 2021-12-31 RX ADMIN — INSULIN LISPRO 9 UNITS: 100 INJECTION, SOLUTION INTRAVENOUS; SUBCUTANEOUS at 17:07

## 2021-12-31 RX ADMIN — CARVEDILOL 3.12 MG: 3.12 TABLET, FILM COATED ORAL at 17:08

## 2021-12-31 RX ADMIN — INSULIN LISPRO 6 UNITS: 100 INJECTION, SOLUTION INTRAVENOUS; SUBCUTANEOUS at 20:46

## 2021-12-31 ASSESSMENT — PAIN SCALES - GENERAL
PAINLEVEL_OUTOF10: 5
PAINLEVEL_OUTOF10: 0
PAINLEVEL_OUTOF10: 0
PAINLEVEL_OUTOF10: 5

## 2021-12-31 NOTE — PROGRESS NOTES
Physical Therapy  Facility/Department: [facility]  Daily Treatment Note  NAME: Roberth Goff  : 1960  MRN: [id number]    Date of Service: 2021    Discharge Recommendations:  Home with assist PRN   PT Equipment Recommendations  Equipment Needed: No    Assessment   Body structures, Functions, Activity limitations: Decreased functional mobility ; Decreased strength; Increased pain  REQUIRES PT FOLLOW UP: Yes  Activity Tolerance  Activity Tolerance: Patient Tolerated treatment well;Patient limited by endurance  Other Comments  Comments: Treatment ended d/t pt being taken down to dialysis     Patient Diagnosis(es): The encounter diagnosis was Acute right-sided thoracic back pain. has a past medical history of Backache, unspecified, CHF (congestive heart failure) ([facility] [address] Chronic kidney disease, Coronary atherosclerosis of artery bypass graft, Cramp of limb, Gallstones, Hypertension, Insomnia, Pneumonia, Type II or unspecified type diabetes mellitus with renal manifestations, not stated as uncontrolled(250.40), Type II or unspecified type diabetes mellitus without mention of complication, not stated as uncontrolled, and Unspecified vitamin D deficiency. has a past surgical history that includes Coronary artery bypass graft; Knee arthroscopy; Carpal tunnel release; Breast surgery; Tonsillectomy; Hand surgery; Ankle fracture surgery; Cholecystectomy, open (N/A); IR TUNNELED CVC PLACE WO SQ PORT/PUMP > 5 YEARS (2021); AV fistula creation (2021); and Dialysis fistula creation (Left, 2021). Restrictions  Restrictions/Precautions  Restrictions/Precautions: General Precautions,Fall Risk  Required Braces or Orthoses?: No  Subjective   General  Chart Reviewed: Yes  Response To Previous Treatment: Patient with no complaints from previous session. Family / Caregiver Present: No  Subjective  Subjective: Pt sidelying in bed on arrival. Pt is pleasant and cooperative with therapy.   General Comment  Comments: DEDE Smith therapy  Pain Screening  Patient Currently in Pain: Denies  Vital Signs  Patient Currently in Pain: Denies  Patient Observation  Observations: Tremors in BUE       Orientation  Orientation  Overall Orientation Status: Within Functional Limits  Cognition      Objective   Bed mobility  Rolling to Left: Supervision  Rolling to Right: Supervision  Supine to Sit: Minimal assistance  Sit to Supine: Unable to assess  Scooting: Stand by assistance  Transfers  Sit to Stand: Contact guard assistance;Minimal Assistance (CGA initially, Jose on 2nd attempt)  Stand to sit: Contact guard assistance;2 Person Assistance  Bed to Chair: Minimal assistance  Stand Pivot Transfers: Minimal Assistance  Comment: standing with RW  Ambulation  Ambulation?: Yes  Ambulation 1  Surface: level tile  Device: Rolling Walker  Assistance: Contact guard assistance  Quality of Gait: Steady, no LOB. No buckling of knees this date. Pt fatigues quickly  Gait Deviations: Increased ELISABET; Decreased step length;Decreased step height  Distance: 10ft x 2 (Amb around back to recliner and back to bed)  Comments: Pt reports increased neck pain after ambulating  Stairs/Curb  Stairs?: No     Balance  Posture: Fair  Sitting - Static: Fair (SBA)  Sitting - Dynamic: Fair;- (SBA)  Standing - Static: Poor;+ (CGA)  Standing - Dynamic: Poor;+ (CGA)  Other exercises  Other exercises?: Yes  Other exercises 1: bed mobility x1  Other exercises 2: STS x 2  Other exercises 3: Seated BLE exercises x 10 (AROM)        Goals  Short term goals  Time Frame for Short term goals: 2-3 days  Short term goal 1: mod-I bed mobility  Short term goal 2: mod-I transfers  Short term goal 3: mod-I gait with RW x 50-100ft    Plan    Plan  Times per week: 5-6x/wk  Current Treatment Recommendations: Strengthening,Balance Training,Functional Mobility Training,Transfer Training,Gait Training  Safety Devices  Type of devices:  All fall risk precautions in place,Call light within reach,Gait belt,Left in bed,Nurse notified (Nursing assistant in room)     Therapy Time   Individual Concurrent Group Co-treatment   Time In 0919         Time Out 0950         Minutes [address] [place], [place]

## 2021-12-31 NOTE — FLOWSHEET NOTE
12/31/21 1145   Encounter Summary   Services provided to: Patient   Referral/Consult From: Rounding   Continue Visiting   (12/31/21 V)   Volunteer Visit Yes   Complexity of Encounter Low   Length of Encounter 15 minutes   Spiritual/Restorationism   Type Spiritual support   Intervention Prayer

## 2021-12-31 NOTE — PROGRESS NOTES
HEMODIALYSIS PRE-TREATMENT NOTE    Patient Identifiers prior to treatment: Name//MRN    Isolation Required:  Yes                      Isolation Type: MRSA       (please document if patient is being managed as a PUI/COVID-19 patient)        Hepatitis status:                           Date Drawn                             Result  Hepatitis B Surface Antigen 2021     NEG                     Hepatitis B Surface Antibody 2021 POS     48.51   Hepatitis B Core Antibody 2021 NEG          How was Hepatitis Status verified: Epic chart     Was a copy of the labs you documented provided to facility for the patient's chart: yes    Hemodialysis orders verified: yes    Access Within normal limits ( I.e. s/s of infection,...): yes     Pre-Assessment completed: yes by Kyle Lee RN    Pre-dialysis report received from: Ev Lopez                      Time: 10:05

## 2021-12-31 NOTE — PLAN OF CARE
STeady when up with walker, although she felt dizzy when walking around end of bed. No pallor noted. Is resting quietly at this time. Continues to c/o back pain due to muscle spasms, but is also c/o pain in the right buttock due to open abrasion- mepilex was applied this morning. No pain meds required this shift ; only took scheduled neurontin and skelaxin. Very good appetite. How Severe Is Your Dermatophytosis?: moderate Is This A New Presentation, Or A Follow-Up?: Rash Additional History: Tea tree oil

## 2021-12-31 NOTE — PROGRESS NOTES
As  [facility] acute rehab does not have any beds, LILLY reviewed referral and will start pre-cert Monday morning. Pt will have [facility] at that time as her insurance changed on January 1st. It will be helpful to have updated therapy notes for Monday morning. (Please note that pt's med mutual denied ARU yesterday). Monday morning please check with Harrison Oh from [address] regarding pre-cert if [facility] does not have beds. Pt and sister are considering SNFs in the event [facility] also denies. One more notable item, If pt has to go to SNF she does not have a transportation benefit to get her to dialysis and most SNFs will not cover that service. Pt said she would consider [facility] as her dad is there and they may be able to get her into their dialysis in-house unit.

## 2021-12-31 NOTE — PROGRESS NOTES
Patient in dialysis at time of rounds. Case management notes reviewed - current insurance not approving ARU stay.      Nessa Alvarez MD, FAAFP  12/31/2021 [id number]

## 2021-12-31 NOTE — PROGRESS NOTES
[address]   OCCUPATIONAL THERAPY MISSED TREATMENT NOTE   INPATIENT   Date: 21  Patient Name: Mac Mays       Room: 6186/0362-44  MRN: [id number]   Account #: [de-identified]    : 1960  (59 y.o.)  Gender: female   Referring Practitioner: Sakina Stone MD  Diagnosis: Acute right-sided thoracic back pain             REASON FOR MISSED TREATMENT:  Patient at testing and/or off the floor   -   Hemodialysis         SYBIL Salazar

## 2021-12-31 NOTE — PROGRESS NOTES
Department of Internal Medicine  Nephrology Hermes Velazquez MD   Progress Note    Reason for consultation: Management of hemodialysis dependent end-stage renal disease. Consulting physician: Ciera Smith MD    Interval history: Patient was seen and examined today and she does not have any complaints. Back pain is fairly well controlled. History of present illness: This is a 59 y.o. female with a significant past medical history of Chronic atrial fibrillation [on Eliquis], Type 2 diabetes mellitus, essential systemic hypertension, coronary artery disease [s/p CABG in 2004 and PTCA in 2019], heart failure with preserved ejection fraction [LVEF 55%] and end-stage renal disease secondary to diabetic and hypertensive nephropathy [on routine hemodialysis on a Monday/Wednesday/Friday schedule at [address] [place] [address] dialysis unit on [facility] under the care of Dr. Lauren Russell since August 2001 using IJ tunneled dialysis catheter], who presented to the hospital via EMS for evaluation of worsening low back pain rated 10/10. She was seen and examined this morning and she remains quite lethargic and stuporous and difficult to arouse. She however is not in acute respiratory distress.     Scheduled Meds:   tiZANidine  2 mg Oral TID    lidocaine  1 patch TransDERmal Daily    aspirin  81 mg Oral Daily    atorvastatin  80 mg Oral Daily    busPIRone  7.5 mg Oral TID    carvedilol  6.25 mg Oral BID    docusate sodium  100 mg Oral BID    apixaban  5 mg Oral BID    febuxostat  40 mg Oral Daily    furosemide  80 mg Oral BID    gabapentin  300 mg Oral BID    insulin glargine  73 Units SubCUTAneous BID    pantoprazole  40 mg Oral QAM AC    polyethylene glycol  17 g Oral Daily    ranolazine  1,000 mg Oral BID    tamsulosin  0.4 mg Oral Daily    traZODone  75 mg Oral Nightly    sodium chloride flush  5-40 mL IntraVENous 2 times per day    insulin lispro  0-18 Units SubCUTAneous TID WC    insulin lispro  0-9 Units SubCUTAneous Nightly     Continuous Infusions:   dextrose      sodium chloride       PRN Meds:.tiZANidine, senna, glucose, dextrose, glucagon (rDNA), dextrose, sodium chloride flush, sodium chloride, ondansetron **OR** ondansetron, polyethylene glycol, acetaminophen **OR** acetaminophen, HYDROcodone 5 mg - acetaminophen, sodium citrate, sodium citrate    Physical Exam:    VITALS:  BP (!) 97/34   Pulse 60   Temp 98.2 °F (36.8 °C)   Resp 16   Ht 5' 5\" (1.651 m)   Wt 266 lb 12.1 oz (121 kg)   SpO2 94%   BMI 44.39 kg/m²   24HR INTAKE/OUTPUT:      Intake/Output Summary (Last 24 hours) at 12/31/2021 9107  Last data filed at 12/30/2021 1057  Gross per 24 hour   Intake 10 ml   Output --   Net 10 ml     Constitutional: slowed mentation and Lethargic and difficult to arouse    Skin: Skin color, texture, turgor normal. No rashes or lesions    Head: Normocephalic, without obvious abnormality, atraumatic     Cardiovascular/Edema: regular rate and rhythm, S1, S2 normal, no murmur, click, rub or gallop    Respiratory: clear to auscultation bilaterally    Abdomen: soft, non-tender; bowel sounds normal; no masses,  no organomegaly    Back: symmetric, no curvature. ROM normal. No CVA tenderness. Extremities: edema +    Neuro: Awake and alert and not in obvious distress.     CBC:   Recent Labs     12/29/21  0951   WBC 5.0   HGB 9.5*   *     BMP:    Recent Labs     12/29/21  0951 12/30/21  0710   * 133*   K 3.9 3.8   CL 95* 96*   CO2 27 18*   BUN 38* 27*   CREATININE 3.59* 3.12*   GLUCOSE 176* 131*     Lab Results   Component Value Date    NITRU NEGATIVE 11/14/2021    COLORU Yellow 11/14/2021    PHUR 5.0 11/14/2021    WBCUA 5 TO 10 11/14/2021    RBCUA 0 TO 2 11/14/2021    MUCUS NOT REPORTED 11/14/2021    TRICHOMONAS NOT REPORTED 11/14/2021    YEAST FEW 11/14/2021    BACTERIA MANY 11/14/2021    SPECGRAV 1.014 11/14/2021    LEUKOCYTESUR TRACE 11/14/2021    UROBILINOGEN Normal 11/14/2021    BILIRUBINUR NEGATIVE 11/14/2021    GLUCOSEU 3+ 11/14/2021    KETUA NEGATIVE 11/14/2021    AMORPHOUS NOT REPORTED 11/14/2021     Urine Sodium:     Lab Results   Component Value Date    JASON 47 11/14/2021     Urine Chloride:    Lab Results   Component Value Date    CLUR 26 11/14/2021     Urine Protein:     Lab Results   Component Value Date    TPU 20 11/12/2021     Urine Creatinine:     Lab Results   Component Value Date    LABCREA 72.0 11/14/2021     IMPRESSION/RECOMMENDATIONS:      1.  End-stage renal disease - we will maintain MWF hemodialysis schedule. Vascular access is IJ tunneled hemodialysis catheter. Renal diet,i.e 2-gram sodium,2-gram potassium,1500 ml fluid restriction,1-gram phosphorus, 1800 KCal and 1.2 gram protein per day. 2.  Altered mental status - rule out narcotic analgesia overdosage. blood glucose is within normal range. Sepsis will be ruled out also. Mental status is back to baseline. 3.   Normocytic anemia of chronic kidney disease - hemoglobin 9.5 g/dL [12/29/2021]. 4.  Systemic hypertension - patient is currently hypotensive but asymptomatic. Will decrease carvedilol to 3.125 mg p.o. twice daily and hold furosemide. 5.  Hyponatremia - Secondary to hyperglycemia with normal corrected sodium level. 6.  Anion gap metabolic acidosis - secondary to uremia. Will start oral sodium bicarbonate. Check beta hydroxybutyric acid levels. No objection on transfer to acute rehab    Prognosis is guarded.     Rubi Ventura MD   Attending Nephrologist  12/31/2021 9:18 AM

## 2021-12-31 NOTE — PROGRESS NOTES
HEMODIALYSIS POST TREATMENT NOTE    Treatment time ordered: 3Hrs    Actual treatment time: 3Hrs    UltraFiltration Goal: 2Kgs  UltraFiltration Removed: 2Kgs      Pre Treatment weight: 121Kgs  Post Treatment weight: 119Kgs  Estimated Dry Weight: 111Kgs    Access used:     Central Venous Catheter:          Tunneled or Non-tunneled: Tunneled           Site: R Chest          Access Flow: Good      Internal Access:       AV Fistula or AV Graft: AVF         Site: L Upper arm       Access Flow: Not yet matured patient states it was placed approximately 2weeks ago       Sign and symptoms of infection: No       If YES: N/A    Medications or blood products given: N/A    Chronic outpatient schedule: [facility]    Chronic outpatient unit: [facility]    Summary of response to treatment: Tolerated fairly well with no issues throughout trx. Tolerated 2kgs of fluid removal.    Explain if orders NOT met, was physician notified:N/A      ACES flowsheet faxed to patient unit/ placed in patient chart: yes    Post assessment completed: yes by Roberta Mcwilliams RN    Report given to: [address] documented Safety Checks include the followin) Access and face visible at all times. 2) All connections and blood lines are secure with no kinks. 3) NVL alarm engaged. 4) Hemosafe device applied (if applicable). 5) No collapse of Arterial or Venous blood chambers. 6) All blood lines / pump segments in the air detectors.

## 2022-01-01 LAB
ANION GAP SERPL CALCULATED.3IONS-SCNC: 12 MMOL/L (ref 9–17)
BUN BLDV-MCNC: 22 MG/DL (ref 8–23)
BUN/CREAT BLD: ABNORMAL (ref 9–20)
CALCIUM SERPL-MCNC: 8.8 MG/DL (ref 8.6–10.4)
CHLORIDE BLD-SCNC: 93 MMOL/L (ref 98–107)
CO2: 26 MMOL/L (ref 20–31)
CREAT SERPL-MCNC: 3.12 MG/DL (ref 0.5–0.9)
GFR AFRICAN AMERICAN: 18 ML/MIN
GFR NON-AFRICAN AMERICAN: 15 ML/MIN
GFR SERPL CREATININE-BSD FRML MDRD: ABNORMAL ML/MIN/{1.73_M2}
GFR SERPL CREATININE-BSD FRML MDRD: ABNORMAL ML/MIN/{1.73_M2}
GLUCOSE BLD-MCNC: 106 MG/DL (ref 70–99)
GLUCOSE BLD-MCNC: 127 MG/DL (ref 65–105)
GLUCOSE BLD-MCNC: 170 MG/DL (ref 65–105)
GLUCOSE BLD-MCNC: 215 MG/DL (ref 65–105)
GLUCOSE BLD-MCNC: 242 MG/DL (ref 65–105)
GLUCOSE BLD-MCNC: 260 MG/DL (ref 65–105)
GLUCOSE BLD-MCNC: 278 MG/DL (ref 65–105)
GLUCOSE BLD-MCNC: 289 MG/DL (ref 65–105)
GLUCOSE BLD-MCNC: 317 MG/DL (ref 65–105)
GLUCOSE BLD-MCNC: 91 MG/DL (ref 65–105)
POTASSIUM SERPL-SCNC: 4 MMOL/L (ref 3.7–5.3)
SODIUM BLD-SCNC: 131 MMOL/L (ref 135–144)

## 2022-01-01 PROCEDURE — 2580000003 HC RX 258: Performed by: FAMILY MEDICINE

## 2022-01-01 PROCEDURE — 97116 GAIT TRAINING THERAPY: CPT

## 2022-01-01 PROCEDURE — 80048 BASIC METABOLIC PNL TOTAL CA: CPT

## 2022-01-01 PROCEDURE — 6370000000 HC RX 637 (ALT 250 FOR IP): Performed by: STUDENT IN AN ORGANIZED HEALTH CARE EDUCATION/TRAINING PROGRAM

## 2022-01-01 PROCEDURE — G0378 HOSPITAL OBSERVATION PER HR: HCPCS

## 2022-01-01 PROCEDURE — 82947 ASSAY GLUCOSE BLOOD QUANT: CPT

## 2022-01-01 PROCEDURE — 99232 SBSQ HOSP IP/OBS MODERATE 35: CPT | Performed by: FAMILY MEDICINE

## 2022-01-01 PROCEDURE — 6370000000 HC RX 637 (ALT 250 FOR IP): Performed by: FAMILY MEDICINE

## 2022-01-01 PROCEDURE — 36415 COLL VENOUS BLD VENIPUNCTURE: CPT

## 2022-01-01 PROCEDURE — 97110 THERAPEUTIC EXERCISES: CPT

## 2022-01-01 PROCEDURE — 6370000000 HC RX 637 (ALT 250 FOR IP): Performed by: PSYCHIATRY & NEUROLOGY

## 2022-01-01 PROCEDURE — 6370000000 HC RX 637 (ALT 250 FOR IP): Performed by: INTERNAL MEDICINE

## 2022-01-01 RX ADMIN — RANOLAZINE 1000 MG: 500 TABLET, FILM COATED, EXTENDED RELEASE ORAL at 20:58

## 2022-01-01 RX ADMIN — INSULIN LISPRO 9 UNITS: 100 INJECTION, SOLUTION INTRAVENOUS; SUBCUTANEOUS at 17:29

## 2022-01-01 RX ADMIN — TIZANIDINE 2 MG: 2 TABLET ORAL at 21:00

## 2022-01-01 RX ADMIN — TIZANIDINE 2 MG: 2 TABLET ORAL at 09:11

## 2022-01-01 RX ADMIN — INSULIN LISPRO 9 UNITS: 100 INJECTION, SOLUTION INTRAVENOUS; SUBCUTANEOUS at 21:01

## 2022-01-01 RX ADMIN — FEBUXOSTAT 40 MG: 40 TABLET, FILM COATED ORAL at 09:22

## 2022-01-01 RX ADMIN — POLYETHYLENE GLYCOL 3350 17 G: 17 POWDER, FOR SOLUTION ORAL at 09:16

## 2022-01-01 RX ADMIN — GABAPENTIN 300 MG: 300 CAPSULE ORAL at 21:02

## 2022-01-01 RX ADMIN — BUSPIRONE HYDROCHLORIDE 7.5 MG: 5 TABLET ORAL at 15:07

## 2022-01-01 RX ADMIN — SODIUM BICARBONATE 650 MG: 650 TABLET ORAL at 09:13

## 2022-01-01 RX ADMIN — GABAPENTIN 300 MG: 300 CAPSULE ORAL at 09:11

## 2022-01-01 RX ADMIN — RANOLAZINE 1000 MG: 500 TABLET, FILM COATED, EXTENDED RELEASE ORAL at 09:12

## 2022-01-01 RX ADMIN — SODIUM BICARBONATE 650 MG: 650 TABLET ORAL at 20:58

## 2022-01-01 RX ADMIN — ATORVASTATIN CALCIUM 80 MG: 80 TABLET, FILM COATED ORAL at 09:11

## 2022-01-01 RX ADMIN — TRAZODONE HYDROCHLORIDE 75 MG: 50 TABLET ORAL at 21:00

## 2022-01-01 RX ADMIN — APIXABAN 5 MG: 5 TABLET, FILM COATED ORAL at 21:00

## 2022-01-01 RX ADMIN — SODIUM CHLORIDE, PRESERVATIVE FREE 10 ML: 5 INJECTION INTRAVENOUS at 22:18

## 2022-01-01 RX ADMIN — BUSPIRONE HYDROCHLORIDE 7.5 MG: 5 TABLET ORAL at 20:58

## 2022-01-01 RX ADMIN — INSULIN GLARGINE 73 UNITS: 100 INJECTION, SOLUTION SUBCUTANEOUS at 09:33

## 2022-01-01 RX ADMIN — APIXABAN 5 MG: 5 TABLET, FILM COATED ORAL at 09:13

## 2022-01-01 RX ADMIN — HYDROCODONE BITARTRATE AND ACETAMINOPHEN 1 TABLET: 5; 325 TABLET ORAL at 09:11

## 2022-01-01 RX ADMIN — TAMSULOSIN HYDROCHLORIDE 0.4 MG: 0.4 CAPSULE ORAL at 09:13

## 2022-01-01 RX ADMIN — ASPIRIN 81 MG: 81 TABLET, CHEWABLE ORAL at 09:12

## 2022-01-01 RX ADMIN — DOCUSATE SODIUM 100 MG: 100 CAPSULE ORAL at 21:00

## 2022-01-01 RX ADMIN — HYDROCODONE BITARTRATE AND ACETAMINOPHEN 1 TABLET: 5; 325 TABLET ORAL at 15:16

## 2022-01-01 RX ADMIN — SODIUM CHLORIDE, PRESERVATIVE FREE 10 ML: 5 INJECTION INTRAVENOUS at 09:23

## 2022-01-01 RX ADMIN — DOCUSATE SODIUM 100 MG: 100 CAPSULE ORAL at 09:13

## 2022-01-01 RX ADMIN — INSULIN LISPRO 9 UNITS: 100 INJECTION, SOLUTION INTRAVENOUS; SUBCUTANEOUS at 12:09

## 2022-01-01 RX ADMIN — TIZANIDINE 2 MG: 2 TABLET ORAL at 15:07

## 2022-01-01 RX ADMIN — PANTOPRAZOLE SODIUM 40 MG: 40 TABLET, DELAYED RELEASE ORAL at 05:01

## 2022-01-01 RX ADMIN — INSULIN GLARGINE 73 UNITS: 100 INJECTION, SOLUTION SUBCUTANEOUS at 21:00

## 2022-01-01 RX ADMIN — BUSPIRONE HYDROCHLORIDE 7.5 MG: 5 TABLET ORAL at 09:12

## 2022-01-01 RX ADMIN — HYDROCODONE BITARTRATE AND ACETAMINOPHEN 1 TABLET: 5; 325 TABLET ORAL at 21:12

## 2022-01-01 ASSESSMENT — PAIN SCALES - GENERAL
PAINLEVEL_OUTOF10: 6
PAINLEVEL_OUTOF10: 2

## 2022-01-01 ASSESSMENT — PAIN DESCRIPTION - DESCRIPTORS: DESCRIPTORS: SPASM

## 2022-01-01 ASSESSMENT — PAIN DESCRIPTION - LOCATION
LOCATION: BACK
LOCATION: BACK;LEG

## 2022-01-01 ASSESSMENT — PAIN DESCRIPTION - ORIENTATION
ORIENTATION: MID;LOWER
ORIENTATION: LOWER;RIGHT;LEFT

## 2022-01-01 ASSESSMENT — PAIN DESCRIPTION - PAIN TYPE
TYPE: CHRONIC PAIN
TYPE: CHRONIC PAIN

## 2022-01-01 NOTE — PLAN OF CARE
Problem: Falls - Risk of:  Goal: Will remain free from falls  Description: Will remain free from falls  1/1/2022 0106 by Azeem Vega RN  Outcome: Ongoing  12/31/2021 1837 by José Miguel Ye RN  Outcome: Ongoing  Goal: Absence of physical injury  Description: Absence of physical injury  1/1/2022 0106 by Azeem Vega RN  Outcome: Ongoing  12/31/2021 1837 by José Miguel Ye RN  Outcome: Ongoing     Problem: Pain:  Goal: Pain level will decrease  Description: Pain level will decrease  1/1/2022 0106 by Azeem Vgea RN  Outcome: Ongoing  12/31/2021 1837 by José Miguel Ye RN  Outcome: Ongoing  Goal: Control of acute pain  Description: Control of acute pain  1/1/2022 0106 by Azeem Vega RN  Outcome: Ongoing  12/31/2021 1837 by José Miguel Ye RN  Outcome: Ongoing     Problem: Skin Integrity:  Goal: Will show no infection signs and symptoms  Description: Will show no infection signs and symptoms  1/1/2022 0106 by Azeem Vega RN  Outcome: Ongoing  12/31/2021 1837 by José Miguel Ye RN  Outcome: Ongoing  Goal: Absence of new skin breakdown  Description: Absence of new skin breakdown  1/1/2022 0106 by Azeem Vega RN  Outcome: Ongoing  12/31/2021 1837 by José Miguel Ye RN  Outcome: Ongoing     Problem: Pain:  Goal: Pain level will decrease  Description: Pain level will decrease  1/1/2022 0106 by Azeem Vega RN  Outcome: Ongoing  12/31/2021 1837 by José Miguel Ye RN  Outcome: Ongoing     Problem: Skin Integrity:  Goal: Will show no infection signs and symptoms  Description: Will show no infection signs and symptoms  1/1/2022 0106 by Azeem Vega RN  Outcome: Ongoing  12/31/2021 1837 by José Miguel Ye RN  Outcome: Ongoing

## 2022-01-01 NOTE — PROGRESS NOTES
Offered to place overlay pressure relieving air mattress on patient's bed to help prevent skin  Breakdown. Patient refused, stated it is more uncomfortable for her and that she knows to stay off her backside as much as possible. Patient verbalized and demonstrated knowledge of turning and repositioning frequently throughout shift.

## 2022-01-01 NOTE — PROGRESS NOTES
Physical Therapy  Facility/Department: [facility] MED SURG  Daily Treatment Note  NAME: Grady Mcknight  : 1960  MRN: [id number]    Date of Service: 2022    Discharge Recommendations:  Home with assist PRN   PT Equipment Recommendations  Equipment Needed: No    Assessment   Body structures, Functions, Activity limitations: Decreased functional mobility ; Decreased strength; Increased pain  Barriers to Learning: Anxiety/tearful  REQUIRES PT FOLLOW UP: Yes  Activity Tolerance  Activity Tolerance: Patient Tolerated treatment well;Patient limited by endurance  Other Comments  Comments: Treatment ended d/t pt being taken down to dialysis     Patient Diagnosis(es): The encounter diagnosis was Acute right-sided thoracic back pain. has a past medical history of Backache, unspecified, CHF (congestive heart failure) ([facility] [address] Chronic kidney disease, Coronary atherosclerosis of artery bypass graft, Cramp of limb, Gallstones, Hypertension, Insomnia, Pneumonia, Type II or unspecified type diabetes mellitus with renal manifestations, not stated as uncontrolled(250.40), Type II or unspecified type diabetes mellitus without mention of complication, not stated as uncontrolled, and Unspecified vitamin D deficiency. has a past surgical history that includes Coronary artery bypass graft; Knee arthroscopy; Carpal tunnel release; Breast surgery; Tonsillectomy; Hand surgery; Ankle fracture surgery; Cholecystectomy, open (N/A); IR TUNNELED CVC PLACE WO SQ PORT/PUMP > 5 YEARS (2021); AV fistula creation (2021); and Dialysis fistula creation (Left, 2021). Restrictions  Restrictions/Precautions  Restrictions/Precautions: General Precautions,Fall Risk  Required Braces or Orthoses?: No  Subjective   Subjective  Subjective: Pt sitting in bedside recliner upon entry, agreeable to PT. Pt report increased tremors in both legs. General Comment  Comments: Pt is pleasant and cooperative, Ok per nursing to treatment.   Pain Assessment  Pain Assessment: 0-10  Pain Level: 6  Patient's Stated Pain Goal: No pain  Pain Type: Chronic pain  Pain Location: Back  Pain Orientation: Mid;Lower  Pain Descriptors: Spasm  Non-Pharmaceutical Pain Intervention(s): Ambulation/Increased Activity  Response to Pain Intervention: Patient Satisfied  Oxygen Therapy  SpO2: 97 %  Pulse Oximeter Device Mode: Intermittent  Pulse Oximeter Device Location: Finger  O2 Device: Nasal cannula  O2 Flow Rate (L/min): 2 L/min  Patient Observation  Observations: Tremors in BUE       Orientation  Orientation  Overall Orientation Status: Within Functional Limits  Cognition      Objective      Transfers  Sit to Stand: Contact guard assistance (x 1)  Stand to sit: Contact guard assistance (x 1)  Bed to Chair: Contact guard assistance  Stand Pivot Transfers: Contact guard assistance  Comment: standing with RW  Ambulation  Ambulation?: Yes  Ambulation 1  Surface: level tile  Device: Rolling Walker  Other Apparatus: O2 (2L)  Assistance: Contact guard assistance  Quality of Gait: Steady, no LOB. No buckling of knees this date. Pt fatigues quickly  Gait Deviations: Increased ELISABET; Decreased step length;Decreased step height  Distance: 10ft x 2  Comments: Pt had to rest following first 10' walk, report feeling very weak like her legs wanted to give out. Therapist was able to pull chair close and assist pt in chair. Stairs/Curb  Stairs?: No     Balance  Posture: Fair  Sitting - Static: Fair;+  Sitting - Dynamic: Fair  Standing - Static: Fair (w/RW)  Standing - Dynamic: +;Fair (w/RW)  Comments: Standing with RW, pt easily fatigued.   Other exercises  Other exercises?: Yes  Other exercises 1: Ther ex both L/E seated x 10  Other exercises 2: Both U/E w/ MOD (Lime) T-band x 1 2 sets 5's  (Issued HEP)  Other exercises 3: it<>Stand x 2 (cues for hand placement and to lower herself in chair.)         Other Activities: Other (see comment) (worked on relaxation techs)              G-Code OutComes Score                                                     AM-PAC Score             Goals  Short term goals  Time Frame for Short term goals: 2-3 days  Short term goal 1: mod-I bed mobility  Short term goal 2: mod-I transfers  Short term goal 3: mod-I gait with RW x 50-100ft    Plan    Plan  Times per week: 5-6x/wk  Current Treatment Recommendations: Strengthening,Balance Training,Functional Mobility Training,Transfer Training,Gait Training  Safety Devices  Type of devices:  All fall risk precautions in place,Call light within reach,Gait belt,Nurse notified,Left in chair     Therapy Time   Individual Concurrent Group Co-treatment   Time In 1247         Time Out 1314         Minutes [address] [place] [address], [facility]   Electronically signed by Atul Ward PTA on 1/1/2022 at 2:52 PM

## 2022-01-01 NOTE — PROGRESS NOTES
[facility]    INPATIENT OCCUPATIONAL THERAPY  PROGRESS NOTE  Date: 2022  Patient Name: Montserrat Wheatley      Room: 5593/0540-72  MRN: [id number]    : 1960  (62 y.o.) Gender: female     Discharge Recommendations:  Further Occupational Therapy is recommended upon facility discharge. Equipment Needed: No    Referring Practitioner: Nilam Aguilar MD  Diagnosis: Acute right-sided thoracic back pain  General  Chart Reviewed: Yes  Patient assessed for rehabilitation services?: Yes  Additional Pertinent Hx: 59 y.o. female with past medical history significant for end-stage renal disease on dialysis, CAD, diabetes mellitus via EMS with reports of back pain. Patient is alert although does not seem to be answering questions appropriately, history is unreliable with multiple stories being reported by patient. Patient reports back pain right-sided with no injury initially and then reports that she was picking something up and did have pain of the right side which happened a week ago. Patient does state that she has had prior incidence of back spasms and back pain. Response to previous treatment: Patient with no complaints from previous session  Family / Caregiver Present: No  Referring Practitioner: Nilam Aguilar MD  Diagnosis: Acute right-sided thoracic back pain    Restrictions  Restrictions/Precautions: General Precautions,Fall Risk  Required Braces or Orthoses?: No      Subjective  Subjective: \"I'm just tired today\". Pt demos with increased fatigue this date after PT tx. Comments: DEDE hay's tx. Patient Currently in Pain: Yes  Pain Level: 6  Pain Location: Back;Leg  Pain Orientation: Lower;Right;Left  Overall Orientation Status: Within Functional Limits  Patient Observation  Observations: Pt reports intermittent tremors in BLEs. Demos with interittmittent sensations stating \"it feel like someone is kicking my hands or feet\". RN notified and aware.    Pain Assessment  Pain Assessment: 0-10  Pain Level: 6  Pain Type: Chronic pain  Pain Location: Back,Leg  Pain Orientation: Lower,Right,Left    Objective  Cognition  Overall Cognitive Status: WFL     Balance  Sitting Balance: Stand by assistance (sitting in recliner)  Standing Balance: Unable to assess(comment) (standing deferred due to increased )         ADL  Feeding: Setup; Adaptive utensils (comment)  Grooming: Setup  UE Bathing: Minimal assistance  LE Bathing: Moderate assistance  UE Dressing: Minimal assistance  LE Dressing: Moderate assistance  Toileting: Moderate assistance  Additional Comments: ADL scores based on clinical reasoning and skilled observation unless otherwise noted. Pt currrently limited due to decreased strength, balance, activity, and increased pain impacting safety and independence with self care tasks. Type of ROM/Therapeutic Exercise  Type of ROM/Therapeutic Exercise: Free weights  Comment: Pt engaged in BUE ex's while in bed to promote increased strengthening and endurance. Pt completes each exercise to tolerance; x20 reps, 1# weight. Exercises  Scapular Protraction: x  Scapular Retraction: x  Shoulder Flexion: x  Shoulder Extension: x  Shoulder ABduction: x  Shoulder ADduction: x  Horizontal ABduction: x  Horizontal ADduction: x  Elbow Flexion: x  Elbow Extension: x  Supination: x  Pronation: x  Wrist Flexion: x  Wrist Extension: x                          Assessment  Performance deficits / Impairments: Decreased functional mobility ; Decreased ADL status; Decreased strength;Decreased safe awareness;Decreased endurance;Decreased sensation;Decreased balance  Prognosis: Good  Discharge Recommendations: Continue to assess pending progress  Activity Tolerance: Patient limited by fatigue  Safety Devices in place: Yes  Type of devices: Call light within reach; All fall risk precautions in place;Nurse notified; Left in chair  Equipment Recommendations  Equipment Needed: No          Patient Education: OT POC, effective technique and importance of daily UB exercises for optimal rehab.       Learner:patient  Method: explanation       Outcome: acknowledged understanding of      Plan  Safety Devices  Safety Devices in place: Yes  Type of devices: Call light within reach,All fall risk precautions in place,Nurse notified,Left in chair  Plan  Times per week: 4-5  Times per day: Daily  Current Treatment Recommendations: Strengthening,ROM,Balance Training,Functional Mobility Jose Alfredo Ren Education & Training,Patient/Caregiver Education & Training,Equipment Evaluation, Education, & procurement,Self-Care / ADL      Goals  Short term goals  Time Frame for Short term goals: By discharge  Short term goal 1: Patient will perform BADLs with mod I and good safety  Short term goal 2: Patient will tolerate standing 4+ minutes, with no LOB, mod I and good safety  Short term goal 3: Patient will perform transfers/functional mobility with mod I   Short term goal 4: Patient will V/D at least 3 EC/WS/fall prevention strategies to promote safety with daily routine  Short term goal 5: Patient will actively participate in 15+ minutes of therapeutic activity to promote independence with self care and mobility    OT Individual Minutes  Time In: 9006  Time Out: 2398 [employer]  Minutes: 26      Electronically signed by Sebastian Osgood, COTA/L on 1/1/22 at 3:34 PM EST

## 2022-01-01 NOTE — PROGRESS NOTES
FAMILY MEDICINE  - PROGRESS NOTE    Date:  1/1/2022  Mac Mays  [id number]      Chief Complaint   Patient presents with    Back Pain         Interval History:  not changed much, she is sleeping soundly this am. Her pain has resolved. No significant events overnight. Specialists notes, labs & imaging reviewed.       Subjective  Constitutional: positive for fatigue and obesity  Musculoskeletal:positive for muscle weakness and myalgias  Neurological: positive for memory problems and tremors  Endocrine: positive for diabetic symptoms including neuropathy and hyperglycemia:    Objective:    /60   Pulse 59   Temp 97.9 °F (36.6 °C)   Resp 18   Ht 5' 5\" (1.651 m)   Wt 262 lb 5.6 oz (119 kg)   SpO2 97%   BMI 43.66 kg/m²   General appearance - overweight  Mental status - drowsy  Eyes - not examined  Ears - bilateral TM's and external ear canals normal  Nose - normal and patent, no erythema, discharge or polyps  Mouth - mucous membranes moist, pharynx normal without lesions  Neck - supple, no significant adenopathy  Lymphatics - no palpable lymphadenopathy, no hepatosplenomegaly  Chest - clear to auscultation, no wheezes, rales or rhonchi, symmetric air entry  Heart - normal rate, regular rhythm, normal S1, S2, no murmurs, rubs, clicks or gallops  Abdomen - soft, nontender, nondistended, no masses or organomegaly  Breasts - not examined  Back exam - not examined  Neurological - neck supple without rigidity  Musculoskeletal - no joint tenderness, deformity or swelling  Extremities - peripheral pulses normal, no pedal edema, no clubbing or cyanosis  Skin - normal coloration and turgor, no rashes, no suspicious skin lesions noted    Data:   Medications:   Current Facility-Administered Medications   Medication Dose Route Frequency Provider Last Rate Last Admin    epoetin raphael-epbx (RETACRIT) injection 3,000 Units  3,000 Units SubCUTAneous Once per day on Mon Wed Fri Vanessa Sheriff MD   3,000 Units at 12/31/21 1643    sodium bicarbonate tablet 650 mg  650 mg Oral BID Vanessa Sheriff MD   650 mg at 12/31/21 2052    carvedilol (COREG) tablet 3.125 mg  3.125 mg Oral BID WC Vanessa Sheriff MD   3.125 mg at 12/31/21 1708    anticoagulant sodium citrate 4 % injection 1.9 mL  1.9 mL IntraCATHeter PRN Vanessa Sheriff MD        anticoagulant sodium citrate 4 % injection 1.9 mL  1.9 mL IntraCATHeter PRN Vanessa Sheriff MD        tiZANidine (ZANAFLEX) tablet 4 mg  4 mg Oral Q6H PRN Jax Ramirez MD   4 mg at 12/30/21 0123    tiZANidine (ZANAFLEX) tablet 2 mg  2 mg Oral TID Jax Ramirez MD   2 mg at 12/31/21 2052    lidocaine 4 % external patch 1 patch  1 patch TransDERmal Daily Jason Reinoso DO   1 patch at 12/31/21 0900    aspirin chewable tablet 81 mg  81 mg Oral Daily Jaky Carroll MD   81 mg at 12/31/21 0902    atorvastatin (LIPITOR) tablet 80 mg  80 mg Oral Daily Jaky Carroll MD   80 mg at 12/31/21 0900    busPIRone (BUSPAR) tablet 7.5 mg  7.5 mg Oral TID Jaky Carroll MD   7.5 mg at 12/31/21 2050    docusate sodium (COLACE) capsule 100 mg  100 mg Oral BID Jaky Carroll MD   100 mg at 12/31/21 2052    apixaban (ELIQUIS) tablet 5 mg  5 mg Oral BID Jaky Carroll MD   5 mg at 12/31/21 2053    febuxostat (ULORIC) tablet 40 mg  40 mg Oral Daily Jaky Carroll MD   40 mg at 12/31/21 1525    gabapentin (NEURONTIN) capsule 300 mg  300 mg Oral BID Jaky Carroll MD   300 mg at 12/31/21 2052    insulin glargine (LANTUS) injection vial 73 Units  73 Units SubCUTAneous BID Jaky Carroll MD   73 Units at 12/31/21 2047    pantoprazole (PROTONIX) tablet 40 mg  40 mg Oral QAM AC Jaky Carroll MD   40 mg at 01/01/22 0501    polyethylene glycol (GLYCOLAX) packet 17 g  17 g Oral Daily Jaky Carroll MD   17 g at 12/31/21 0904    ranolazine (RANEXA) extended release tablet 1,000 mg  1,000 mg Oral BID Basilia Blankenship MD   1,000 mg at 12/31/21 2052    senna (SENOKOT) tablet 17.2 mg  2 tablet Oral Daily PRN Basilia Blankenship MD        [facility]) capsule 0.4 mg  0.4 mg Oral Daily Basilia Blankenship MD   0.4 mg at 12/31/21 0900    traZODone (DESYREL) tablet 75 mg  75 mg Oral Nightly Basilia Blankenship MD   75 mg at 12/31/21 2050    glucose (GLUTOSE) 40 % oral gel 15 g  15 g Oral PRN Basilia Blankenship MD        dextrose 50 % IV solution  12.5 g IntraVENous PRN Basilia Blankenship MD        glucagon (rDNA) injection 1 mg  1 mg IntraMUSCular PRN Basilia Blankenship MD        dextrose 5 % solution  100 mL/hr IntraVENous PRN Basilia Blankenship MD        sodium chloride flush 0.9 % injection 5-40 mL  5-40 mL IntraVENous 2 times per day Basilia Blankenship MD   10 mL at 12/31/21 2055    sodium chloride flush 0.9 % injection 5-40 mL  5-40 mL IntraVENous PRN Basilia Blankenship MD        0.9 % sodium chloride infusion  25 mL IntraVENous PRN Basilia Blankenship MD        ondansetron (ZOFRAN-ODT) disintegrating tablet 4 mg  4 mg Oral Q8H PRN Basilia Blankenship MD        Or    ondansetron [facility]) injection 4 mg  4 mg IntraVENous Q6H PRN Basilia Blaknenship MD        polyethylene glycol [facility]) packet 17 g  17 g Oral Daily PRN Basilia Blankenship MD        acetaminophen (TYLENOL) tablet 650 mg  650 mg Oral Q6H PRN Basilia Blankenship MD   650 mg at 12/29/21 1344    Or    acetaminophen (TYLENOL) suppository 650 mg  650 mg Rectal Q6H PRN Basilia Blankenship MD        insulin lispro (HUMALOG) injection vial 0-18 Units  0-18 Units SubCUTAneous TID WC Basilia Blankenship MD   9 Units at 12/31/21 1707    insulin lispro (HUMALOG) injection vial 0-9 Units  0-9 Units SubCUTAneous Nightly Basilia Blankenship MD   6 Units at 12/31/21 2046    HYDROcodone-acetaminophen (NORCO) 5-325 MG per tablet 1 tablet  1 tablet Oral Q6H PRN Basilia Blankenship, MD   1 tablet at 12/31/21 7028     No intake or output data in the 24 hours ending 01/01/22 0888  Recent Results (from the past 24 hour(s))   BETA-HYDROXYBUTYRATE    Collection Time: 12/31/21 11:20 AM   Result Value Ref Range    Beta-Hydroxybutyrate 0.09 0.02 - 0.27 mmol/L   Basic Metabolic Panel w/ Reflex to MG    Collection Time: 12/31/21 11:20 AM   Result Value Ref Range    Glucose 172 (H) 70 - 99 mg/dL    BUN 22 8 - 23 mg/dL    CREATININE 2.52 (H) 0.50 - 0.90 mg/dL    Bun/Cre Ratio NOT REPORTED 9 - 20    Calcium 8.6 8.6 - 10.4 mg/dL    Sodium 131 (L) 135 - 144 mmol/L    Potassium 3.5 (L) 3.7 - 5.3 mmol/L    Chloride 93 (L) 98 - 107 mmol/L    CO2 27 20 - 31 mmol/L    Anion Gap 11 9 - 17 mmol/L    GFR Non-African American 19 (L) >60 mL/min    GFR  23 (L) >60 mL/min    GFR Comment          GFR Staging NOT REPORTED    Magnesium    Collection Time: 12/31/21 11:20 AM   Result Value Ref Range    Magnesium 2.0 1.6 - 2.6 mg/dL   Basic Metabolic Panel w/ Reflex to MG    Collection Time: 01/01/22  7:17 AM   Result Value Ref Range    Glucose 106 (H) 70 - 99 mg/dL    BUN 22 8 - 23 mg/dL    CREATININE 3.12 (H) 0.50 - 0.90 mg/dL    Bun/Cre Ratio NOT REPORTED 9 - 20    Calcium 8.8 8.6 - 10.4 mg/dL    Sodium 131 (L) 135 - 144 mmol/L    Potassium 4.0 3.7 - 5.3 mmol/L    Chloride 93 (L) 98 - 107 mmol/L    CO2 26 20 - 31 mmol/L    Anion Gap 12 9 - 17 mmol/L    GFR Non-African American 15 (L) >60 mL/min    GFR  18 (L) >60 mL/min    GFR Comment          GFR Staging NOT REPORTED      -----------------------------------------------------------------  RAD:  EKG:  Micro:     Assessment & Plan:    Patient Active Problem List:     Atherosclerosis of coronary artery bypass graft of native heart without angina pectoris     Acute renal failure (HCC)     Diabetes mellitus due to underlying condition with diabetic nephropathy, with long-term current use of insulin (HCC)     Chronic diastolic heart failure (HCC) Diabetic polyneuropathy associated with type 2 diabetes mellitus ([facility] [address]     History of coronary artery bypass graft     Iron deficiency anemia     Spinal stenosis of lumbar region with neurogenic claudication     Mixed hyperlipidemia     Stage 4 chronic kidney disease (HCC)     Type 2 diabetes mellitus with kidney complication, with long-term current use of insulin (HCC)     Syncope and collapse     Obesity, Class II, BMI 35-39.9     Thyroid nodule greater than or equal to 1 cm in diameter incidentally noted on imaging study     Essential hypertension     Anemia in stage 4 chronic kidney disease (HCC)     Chronic ischemic heart disease     Ischemic stroke of frontal lobe ([facility])     Stroke-like symptoms     Morbid obesity with BMI of 40.0-44.9, adult ([facility])     Acute encephalopathy     Disequilibrium syndrome     Generalized weakness     Long-term memory loss     Muscle right arm weakness     Anxiety     Chronic midline low back pain with bilateral sciatica     Need for immunization against influenza     Altered mental status     Acute right-sided thoracic back pain     Tremor     Metabolic encephalopathy           Plan:  -Metabolic encephalopathy - mental status back at baseline. -ESRD on dialysis - Nephrology managing. -DM2 - stable. -D/C planning ongoing - ARU vs SNF.  -Continue current treatments.  -Complete orders per chart.     See orders   Disposition:    Electronically signed by Azalia Robles MD on 1/1/2022 at 8:54 AM

## 2022-01-01 NOTE — PROGRESS NOTES
Right upper arm IV was infiltrated and was removed. This RN attempted to establish new peripheral IV without success x2 attempts.  Patient tolerated well

## 2022-01-01 NOTE — PLAN OF CARE
Problem: Falls - Risk of:  Goal: Will remain free from falls  Description: Will remain free from falls  Outcome: Ongoing-standard precautions in place.           Problem: Falls - Risk of:  Goal: Absence of physical injury  Description: Absence of physical injury  Outcome: Ongoing-   Problem: Skin Integrity:  Goal: Will show no infection signs and symptoms  Description: Will show no infection signs and symptoms  Outcome: Ongoing-  Problem: Skin Integrity:  Goal: Absence of new skin breakdown  Description: Absence of new skin breakdown  Outcome: Ongoing

## 2022-01-01 NOTE — PROGRESS NOTES
Department of Internal Medicine  Nephrology [facility], MD   Progress Note    Reason for consultation: Management of hemodialysis dependent end-stage renal disease. Consulting physician: Ahsan Edmondson MD    Interval history:   Patient was seen and examined today, had dialysis yesterday, no acute events noted c/o back pain. She is not in acute respiratory distress. Blood pressure stable    History of present illness: This is a 59 y.o. female with a significant past medical history of Chronic atrial fibrillation [on Eliquis], Type 2 diabetes mellitus, essential systemic hypertension, coronary artery disease [s/p CABG in 2004 and PTCA in 2019], heart failure with preserved ejection fraction [LVEF 55%] and end-stage renal disease secondary to diabetic and hypertensive nephropathy [on routine hemodialysis on a Monday/Wednesday/Friday schedule at [address] dialysis unit on [facility] under the care of Dr. Avelino Cazares since August 2001 using IJ tunneled dialysis catheter], who presented to the hospital via EMS for evaluation of worsening low back pain rated 10/10. She was seen and examined this morning and she remains quite lethargic and stuporous and difficult to arouse. She however is not in acute respiratory distress.     Scheduled Meds:   epoetin raphael-epbx  3,000 Units SubCUTAneous Once per day on Mon Wed Fri    sodium bicarbonate  650 mg Oral BID    carvedilol  3.125 mg Oral BID WC    tiZANidine  2 mg Oral TID    lidocaine  1 patch TransDERmal Daily    aspirin  81 mg Oral Daily    atorvastatin  80 mg Oral Daily    busPIRone  7.5 mg Oral TID    docusate sodium  100 mg Oral BID    apixaban  5 mg Oral BID    febuxostat  40 mg Oral Daily    gabapentin  300 mg Oral BID    insulin glargine  73 Units SubCUTAneous BID    pantoprazole  40 mg Oral QAM AC    polyethylene glycol  17 g Oral Daily    ranolazine  1,000 mg Oral BID    tamsulosin  0.4 mg Oral Daily    traZODone  75 mg Oral Nightly  sodium chloride flush  5-40 mL IntraVENous 2 times per day    insulin lispro  0-18 Units SubCUTAneous TID WC    insulin lispro  0-9 Units SubCUTAneous Nightly     Continuous Infusions:   dextrose      sodium chloride       PRN Meds:.anticoagulant sodium citrate, anticoagulant sodium citrate, tiZANidine, senna, glucose, dextrose, glucagon (rDNA), dextrose, sodium chloride flush, sodium chloride, ondansetron **OR** ondansetron, polyethylene glycol, acetaminophen **OR** acetaminophen, HYDROcodone 5 mg - acetaminophen    Physical Exam:    VITALS:  BP (!) 114/49   Pulse 57   Temp 97.5 °F (36.4 °C)   Resp 18   Ht 5' 5\" (1.651 m)   Wt 262 lb 5.6 oz (119 kg)   SpO2 92%   BMI 43.66 kg/m²   24HR INTAKE/OUTPUT:      Intake/Output Summary (Last 24 hours) at 1/1/2022 1654  Last data filed at 1/1/2022 2293  Gross per 24 hour   Intake 10 ml   Output --   Net 10 ml       Constitutional: slowed mentation and Lethargic and difficult to arouse    Skin: Skin color, texture, turgor normal. No rashes or lesions    Head: Normocephalic, without obvious abnormality, atraumatic     Cardiovascular/Edema: regular rate and rhythm, S1, S2 normal, no murmur, click, rub or gallop    Respiratory: Lungs: clear to auscultation bilaterally    Abdomen: soft, non-tender; bowel sounds normal; no masses,  no organomegaly    Back: symmetric, no curvature. ROM normal. No CVA tenderness. Extremities: edema +    Neuro:  Stuporous and lethargic. CBC:   No results for input(s): WBC, HGB, PLT in the last 72 hours.   BMP:    Recent Labs     12/30/21  0710 12/31/21  1120 01/01/22  0717   * 131* 131*   K 3.8 3.5* 4.0   CL 96* 93* 93*   CO2 18* 27 26   BUN 27* 22 22   CREATININE 3.12* 2.52* 3.12*   GLUCOSE 131* 172* 106*     Lab Results   Component Value Date    NITRU NEGATIVE 11/14/2021    COLORU Yellow 11/14/2021    PHUR 5.0 11/14/2021    WBCUA 5 TO 10 11/14/2021    RBCUA 0 TO 2 11/14/2021    MUCUS NOT REPORTED 11/14/2021    TRICHOMONAS NOT REPORTED 11/14/2021    YEAST FEW 11/14/2021    BACTERIA MANY 11/14/2021    SPECGRAV 1.014 11/14/2021    LEUKOCYTESUR TRACE 11/14/2021    UROBILINOGEN Normal 11/14/2021    BILIRUBINUR NEGATIVE 11/14/2021    GLUCOSEU 3+ 11/14/2021    KETUA NEGATIVE 11/14/2021    AMORPHOUS NOT REPORTED 11/14/2021     Urine Sodium:     Lab Results   Component Value Date    JASON 47 11/14/2021     Urine Chloride:    Lab Results   Component Value Date    CLUR 26 11/14/2021     Urine Protein:     Lab Results   Component Value Date    TPU 20 11/12/2021     Urine Creatinine:     Lab Results   Component Value Date    LABCREA 72.0 11/14/2021     IMPRESSION/RECOMMENDATIONS:      1.  End-stage renal disease - we will maintain [facility] hemodialysis schedule. Vascular access is IJ tunneled hemodialysis catheter. Renal diet,i.e 2-gram sodium,2-gram potassium,1500 ml fluid restriction,1-gram phosphorus, 1800 KCal and 1.2 gram protein per day. 2.  Altered mental status - rule out narcotic analgesia overdosage. blood glucose is within normal range. Sepsis will be ruled out also. Mental status is back to baseline. 3.   Normocytic anemia of chronic kidney disease - hemoglobin 10.4 g/dL is within target range. 4.  Systemic hypertension - blood pressure control is adequate. Plan    Continue hemodialysis Monday Wednesday Friday  Next  hemodialysis monday    No objection on transfer to acute rehab      Prognosis is guarded.     Yenny Otero MD   Attending Nephrologist  1/1/2022 4:54 PM

## 2022-01-02 LAB
ANION GAP SERPL CALCULATED.3IONS-SCNC: 16 MMOL/L (ref 9–17)
BUN BLDV-MCNC: 33 MG/DL (ref 8–23)
BUN/CREAT BLD: ABNORMAL (ref 9–20)
CALCIUM SERPL-MCNC: 8.9 MG/DL (ref 8.6–10.4)
CHLORIDE BLD-SCNC: 93 MMOL/L (ref 98–107)
CO2: 22 MMOL/L (ref 20–31)
CREAT SERPL-MCNC: 4.41 MG/DL (ref 0.5–0.9)
GFR AFRICAN AMERICAN: 12 ML/MIN
GFR NON-AFRICAN AMERICAN: 10 ML/MIN
GFR SERPL CREATININE-BSD FRML MDRD: ABNORMAL ML/MIN/{1.73_M2}
GFR SERPL CREATININE-BSD FRML MDRD: ABNORMAL ML/MIN/{1.73_M2}
GLUCOSE BLD-MCNC: 152 MG/DL (ref 65–105)
GLUCOSE BLD-MCNC: 172 MG/DL (ref 70–99)
GLUCOSE BLD-MCNC: 269 MG/DL (ref 65–105)
GLUCOSE BLD-MCNC: 273 MG/DL (ref 65–105)
POTASSIUM SERPL-SCNC: 4.3 MMOL/L (ref 3.7–5.3)
SODIUM BLD-SCNC: 131 MMOL/L (ref 135–144)

## 2022-01-02 PROCEDURE — 80048 BASIC METABOLIC PNL TOTAL CA: CPT

## 2022-01-02 PROCEDURE — 97530 THERAPEUTIC ACTIVITIES: CPT

## 2022-01-02 PROCEDURE — 6370000000 HC RX 637 (ALT 250 FOR IP): Performed by: STUDENT IN AN ORGANIZED HEALTH CARE EDUCATION/TRAINING PROGRAM

## 2022-01-02 PROCEDURE — G0378 HOSPITAL OBSERVATION PER HR: HCPCS

## 2022-01-02 PROCEDURE — 99232 SBSQ HOSP IP/OBS MODERATE 35: CPT | Performed by: FAMILY MEDICINE

## 2022-01-02 PROCEDURE — 36415 COLL VENOUS BLD VENIPUNCTURE: CPT

## 2022-01-02 PROCEDURE — 97110 THERAPEUTIC EXERCISES: CPT

## 2022-01-02 PROCEDURE — 6370000000 HC RX 637 (ALT 250 FOR IP): Performed by: PSYCHIATRY & NEUROLOGY

## 2022-01-02 PROCEDURE — 6370000000 HC RX 637 (ALT 250 FOR IP): Performed by: FAMILY MEDICINE

## 2022-01-02 PROCEDURE — 97116 GAIT TRAINING THERAPY: CPT

## 2022-01-02 PROCEDURE — 6370000000 HC RX 637 (ALT 250 FOR IP): Performed by: INTERNAL MEDICINE

## 2022-01-02 RX ADMIN — INSULIN GLARGINE 73 UNITS: 100 INJECTION, SOLUTION SUBCUTANEOUS at 09:07

## 2022-01-02 RX ADMIN — TIZANIDINE 2 MG: 2 TABLET ORAL at 09:06

## 2022-01-02 RX ADMIN — RANOLAZINE 1000 MG: 500 TABLET, FILM COATED, EXTENDED RELEASE ORAL at 20:38

## 2022-01-02 RX ADMIN — INSULIN LISPRO 12 UNITS: 100 INJECTION, SOLUTION INTRAVENOUS; SUBCUTANEOUS at 16:55

## 2022-01-02 RX ADMIN — BUSPIRONE HYDROCHLORIDE 7.5 MG: 5 TABLET ORAL at 14:23

## 2022-01-02 RX ADMIN — CARVEDILOL 3.12 MG: 3.12 TABLET, FILM COATED ORAL at 18:11

## 2022-01-02 RX ADMIN — INSULIN GLARGINE 73 UNITS: 100 INJECTION, SOLUTION SUBCUTANEOUS at 20:39

## 2022-01-02 RX ADMIN — CARVEDILOL 3.12 MG: 3.12 TABLET, FILM COATED ORAL at 07:20

## 2022-01-02 RX ADMIN — DOCUSATE SODIUM 100 MG: 100 CAPSULE ORAL at 20:37

## 2022-01-02 RX ADMIN — RANOLAZINE 1000 MG: 500 TABLET, FILM COATED, EXTENDED RELEASE ORAL at 09:06

## 2022-01-02 RX ADMIN — TRAZODONE HYDROCHLORIDE 75 MG: 50 TABLET ORAL at 20:36

## 2022-01-02 RX ADMIN — ATORVASTATIN CALCIUM 80 MG: 80 TABLET, FILM COATED ORAL at 09:05

## 2022-01-02 RX ADMIN — BUSPIRONE HYDROCHLORIDE 7.5 MG: 5 TABLET ORAL at 20:39

## 2022-01-02 RX ADMIN — BUSPIRONE HYDROCHLORIDE 7.5 MG: 5 TABLET ORAL at 09:06

## 2022-01-02 RX ADMIN — GABAPENTIN 300 MG: 300 CAPSULE ORAL at 20:37

## 2022-01-02 RX ADMIN — INSULIN LISPRO 3 UNITS: 100 INJECTION, SOLUTION INTRAVENOUS; SUBCUTANEOUS at 20:39

## 2022-01-02 RX ADMIN — SODIUM BICARBONATE 650 MG: 650 TABLET ORAL at 09:06

## 2022-01-02 RX ADMIN — TIZANIDINE 2 MG: 2 TABLET ORAL at 14:23

## 2022-01-02 RX ADMIN — TIZANIDINE 2 MG: 2 TABLET ORAL at 20:38

## 2022-01-02 RX ADMIN — SODIUM BICARBONATE 650 MG: 650 TABLET ORAL at 20:37

## 2022-01-02 RX ADMIN — GABAPENTIN 300 MG: 300 CAPSULE ORAL at 09:05

## 2022-01-02 RX ADMIN — APIXABAN 5 MG: 5 TABLET, FILM COATED ORAL at 20:38

## 2022-01-02 RX ADMIN — ASPIRIN 81 MG: 81 TABLET, CHEWABLE ORAL at 09:05

## 2022-01-02 RX ADMIN — INSULIN LISPRO 3 UNITS: 100 INJECTION, SOLUTION INTRAVENOUS; SUBCUTANEOUS at 07:20

## 2022-01-02 RX ADMIN — PANTOPRAZOLE SODIUM 40 MG: 40 TABLET, DELAYED RELEASE ORAL at 06:52

## 2022-01-02 RX ADMIN — POLYETHYLENE GLYCOL 3350 17 G: 17 POWDER, FOR SOLUTION ORAL at 09:06

## 2022-01-02 RX ADMIN — INSULIN LISPRO 9 UNITS: 100 INJECTION, SOLUTION INTRAVENOUS; SUBCUTANEOUS at 11:34

## 2022-01-02 RX ADMIN — DOCUSATE SODIUM 100 MG: 100 CAPSULE ORAL at 09:06

## 2022-01-02 RX ADMIN — FEBUXOSTAT 40 MG: 40 TABLET, FILM COATED ORAL at 09:15

## 2022-01-02 RX ADMIN — APIXABAN 5 MG: 5 TABLET, FILM COATED ORAL at 09:06

## 2022-01-02 RX ADMIN — TAMSULOSIN HYDROCHLORIDE 0.4 MG: 0.4 CAPSULE ORAL at 09:06

## 2022-01-02 ASSESSMENT — PAIN DESCRIPTION - LOCATION
LOCATION: SHOULDER
LOCATION: SHOULDER

## 2022-01-02 ASSESSMENT — PAIN DESCRIPTION - DESCRIPTORS: DESCRIPTORS: SORE;ACHING;CONSTANT

## 2022-01-02 ASSESSMENT — PAIN DESCRIPTION - ORIENTATION
ORIENTATION: RIGHT
ORIENTATION: RIGHT

## 2022-01-02 ASSESSMENT — PAIN DESCRIPTION - PROGRESSION: CLINICAL_PROGRESSION: NOT CHANGED

## 2022-01-02 ASSESSMENT — PAIN - FUNCTIONAL ASSESSMENT: PAIN_FUNCTIONAL_ASSESSMENT: PREVENTS OR INTERFERES SOME ACTIVE ACTIVITIES AND ADLS

## 2022-01-02 ASSESSMENT — PAIN DESCRIPTION - PAIN TYPE: TYPE: ACUTE PAIN

## 2022-01-02 ASSESSMENT — PAIN SCALES - GENERAL
PAINLEVEL_OUTOF10: 6
PAINLEVEL_OUTOF10: 6

## 2022-01-02 ASSESSMENT — PAIN DESCRIPTION - ONSET: ONSET: ON-GOING

## 2022-01-02 ASSESSMENT — PAIN DESCRIPTION - FREQUENCY: FREQUENCY: CONTINUOUS

## 2022-01-02 NOTE — PROGRESS NOTES
Physical Therapy  Facility/Department: [facility] MED SURG  Daily Treatment Note  NAME: Vishal Esparza  : 1960  MRN: [id number]    Date of Service: 2022    Discharge Recommendations:  Home with assist PRN        Assessment   Body structures, Functions, Activity limitations: Decreased functional mobility ; Decreased strength; Increased pain  REQUIRES PT FOLLOW UP: Yes  Activity Tolerance  Activity Tolerance: Patient limited by fatigue;Patient limited by endurance  Activity Tolerance: Frequent rest breaks PRN     Patient Diagnosis(es): The encounter diagnosis was Acute right-sided thoracic back pain. has a past medical history of Backache, unspecified, CHF (congestive heart failure) ([facility] [address] Chronic kidney disease, Coronary atherosclerosis of artery bypass graft, Cramp of limb, Gallstones, Hypertension, Insomnia, Pneumonia, Type II or unspecified type diabetes mellitus with renal manifestations, not stated as uncontrolled(250.40), Type II or unspecified type diabetes mellitus without mention of complication, not stated as uncontrolled, and Unspecified vitamin D deficiency. has a past surgical history that includes Coronary artery bypass graft; Knee arthroscopy; Carpal tunnel release; Breast surgery; Tonsillectomy; Hand surgery; Ankle fracture surgery; Cholecystectomy, open (N/A); IR TUNNELED CVC PLACE WO SQ PORT/PUMP > 5 YEARS (2021); AV fistula creation (2021); and Dialysis fistula creation (Left, 2021). Restrictions  Restrictions/Precautions  Restrictions/Precautions: General Precautions,Fall Risk  Required Braces or Orthoses?: No  Subjective   General  Chart Reviewed: Yes  Response To Previous Treatment: Patient with no complaints from previous session. Family / Caregiver Present: Yes (Two sisters)  Subjective  Subjective: Pt seated in bedside chair upon arrival, sitting with her 2 sisters.  Pt is pleasant and cooperative with therapy  General Comment  Comments: RN oks therapy treatment  Pain Screening  Patient Currently in Pain: Yes  Pain Assessment  Pain Assessment: 0-10  Pain Level: 6  Pain Type: Acute pain  Pain Location: Shoulder  Pain Orientation: Right  Vital Signs  Patient Currently in Pain: Yes  Patient Observation  Observations: Tremors continued to be present intermittently through BUE and BLE       Orientation  Orientation  Overall Orientation Status: Within Functional Limits  Cognition      Objective      Transfers  Sit to Stand: Contact guard assistance  Stand to sit: Contact guard assistance  Comment: standing with RW  Ambulation  Ambulation?: Yes  Ambulation 1  Surface: level tile  Device: Rolling Walker  Other Apparatus: O2  Assistance: Contact guard assistance;Minimal assistance (Gigi as pt begins to feel unsteady)  Quality of Gait: Steady, no LOB. No buckling of knees this date. Pt fatigues quickly  Gait Deviations: Increased ELISABET; Decreased step length;Decreased step height  Distance: 15ft x 2  Comments: VCs for pt to stay within ELISABET of RW. Pt amb from recliner to room door and back to recliner  Stairs/Curb  Stairs?: No     Balance  Posture: Fair  Sitting - Static: Fair;+  Sitting - Dynamic: Fair  Standing - Static: Fair  Standing - Dynamic: Fair;+  Other exercises  Other exercises?: Yes  Other exercises 1: Seated BLE exs x 10 (Green tband)  Other exercises 2: STS x 3      Goals  Short term goals  Time Frame for Short term goals: 2-3 days  Short term goal 1: mod-I bed mobility  Short term goal 2: mod-I transfers  Short term goal 3: mod-I gait with RW x 50-100ft    Plan    Plan  Times per week: 5-6x/wk  Current Treatment Recommendations: Strengthening,Balance Training,Functional Mobility Training,Transfer Training,Gait Training  Safety Devices  Type of devices:  All fall risk precautions in place,Call light within reach,Gait belt,Nurse notified,Left in chair     Therapy Time   Individual Concurrent Group Co-treatment   Time In 1455         Time Out 1540         Minutes 45 Benja Simon, PTA

## 2022-01-02 NOTE — PROGRESS NOTES
FAMILY MEDICINE  - PROGRESS NOTE    Date:  1/2/2022  Carlton Anthony  [id number]      Chief Complaint   Patient presents with    Back Pain         Interval History:  not changed much, she has questions this am. Her pain has resolved. No significant events overnight. Specialists notes, labs & imaging reviewed. All questions answered to the best of my ability.       Subjective  Constitutional: positive for fatigue and obesity  Musculoskeletal:positive for muscle weakness and myalgias  Neurological: positive for memory problems and tremors  Endocrine: positive for diabetic symptoms including neuropathy and hyperglycemia:    Objective:    BP (!) 123/54   Pulse 66   Temp 97.6 °F (36.4 °C) (Oral)   Resp 18   Ht 5' 5\" (1.651 m)   Wt 262 lb 5.6 oz (119 kg)   SpO2 91%   BMI 43.66 kg/m²   General appearance - overweight  Mental status - drowsy  Eyes - not examined  Ears - bilateral TM's and external ear canals normal  Nose - normal and patent, no erythema, discharge or polyps  Mouth - mucous membranes moist, pharynx normal without lesions  Neck - supple, no significant adenopathy  Lymphatics - no palpable lymphadenopathy, no hepatosplenomegaly  Chest - clear to auscultation, no wheezes, rales or rhonchi, symmetric air entry  Heart - normal rate, regular rhythm, normal S1, S2, no murmurs, rubs, clicks or gallops  Abdomen - soft, nontender, nondistended, no masses or organomegaly  Breasts - not examined  Back exam - not examined  Neurological - neck supple without rigidity  Musculoskeletal - no joint tenderness, deformity or swelling  Extremities - peripheral pulses normal, no pedal edema, no clubbing or cyanosis  Skin - normal coloration and turgor, no rashes, no suspicious skin lesions noted    Data:   Medications:   Current Facility-Administered Medications   Medication Dose Route Frequency Provider Last Rate Last Admin    epoetin raphael-epbx (RETACRIT) injection 3,000 Units  3,000 Units SubCUTAneous Once per day on Mon Wed Fri Jose L Vargas MD   3,000 Units at 12/31/21 1643    sodium bicarbonate tablet 650 mg  650 mg Oral BID Jose L Vargas MD   650 mg at 01/01/22 2058    carvedilol (COREG) tablet 3.125 mg  3.125 mg Oral BID WC Jose L Vargas MD   3.125 mg at 01/02/22 0720    anticoagulant sodium citrate 4 % injection 1.9 mL  1.9 mL IntraCATHeter PRN Jose L Vargas MD        anticoagulant sodium citrate 4 % injection 1.9 mL  1.9 mL IntraCATHeter PRN Jose L Vargas MD        tiZANidine (ZANAFLEX) tablet 4 mg  4 mg Oral Q6H PRN Natalya Fisher MD   4 mg at 12/30/21 0123    tiZANidine (ZANAFLEX) tablet 2 mg  2 mg Oral TID Natalya Fisher MD   2 mg at 01/01/22 2100    lidocaine 4 % external patch 1 patch  1 patch TransDERmal Daily Ludy Branch DO   1 patch at 01/01/22 5293    aspirin chewable tablet 81 mg  81 mg Oral Daily Mary Herbert MD   81 mg at 01/01/22 0912    atorvastatin (LIPITOR) tablet 80 mg  80 mg Oral Daily Mary Herbert MD   80 mg at 01/01/22 0911    busPIRone (BUSPAR) tablet 7.5 mg  7.5 mg Oral TID Mary Herbert MD   7.5 mg at 01/01/22 2058    docusate sodium (COLACE) capsule 100 mg  100 mg Oral BID Mary Herbert MD   100 mg at 01/01/22 2100    apixaban (ELIQUIS) tablet 5 mg  5 mg Oral BID Mary Herbert MD   5 mg at 01/01/22 2100    febuxostat (ULORIC) tablet 40 mg  40 mg Oral Daily Mary Herbert MD   40 mg at 01/01/22 8490    gabapentin (NEURONTIN) capsule 300 mg  300 mg Oral BID Mary Herbert MD   300 mg at 01/01/22 2102    insulin glargine (LANTUS) injection vial 73 Units  73 Units SubCUTAneous BID Mary Herbert MD   73 Units at 01/01/22 2100    pantoprazole (PROTONIX) tablet 40 mg  40 mg Oral QAM AC Mary Herbert MD   40 mg at 01/02/22 0362    polyethylene glycol (GLYCOLAX) packet 17 g  17 g Oral Daily Mary Herbert MD   17 g at 01/01/22 0916    ranolazine (RANEXA) extended release tablet 1,000 mg  1,000 mg Oral BID Ady Elias MD   1,000 mg at 01/01/22 2058    senna (SENOKOT) tablet 17.2 mg  2 tablet Oral Daily PRN Ady Elias MD        [facility]) capsule 0.4 mg  0.4 mg Oral Daily Ady Elias MD   0.4 mg at 01/01/22 0913    traZODone (DESYREL) tablet 75 mg  75 mg Oral Nightly Ady Elias MD   75 mg at 01/01/22 2100    glucose (GLUTOSE) 40 % oral gel 15 g  15 g Oral PRN Ady Elias MD        dextrose 50 % IV solution  12.5 g IntraVENous PRN Ady Elias MD        glucagon (rDNA) injection 1 mg  1 mg IntraMUSCular PRN Ady Elias MD        dextrose 5 % solution  100 mL/hr IntraVENous PRN Ady Elias MD        sodium chloride flush 0.9 % injection 5-40 mL  5-40 mL IntraVENous 2 times per day Ady Elias MD   10 mL at 01/01/22 2218    sodium chloride flush 0.9 % injection 5-40 mL  5-40 mL IntraVENous PRN Ady Elias MD        0.9 % sodium chloride infusion  25 mL IntraVENous RUSTYN Ady Elias MD        ondansetron (ZOFRAN-ODT) disintegrating tablet 4 mg  4 mg Oral Q8H PRN Ady Elias MD        Or    ondansetron [facility]) injection 4 mg  4 mg IntraVENous Q6H PRN Ady Elias MD        polyethylene glycol [facility]) packet 17 g  17 g Oral Daily PRN Ady Elias MD        acetaminophen (TYLENOL) tablet 650 mg  650 mg Oral Q6H PRN Ady Elias MD   650 mg at 12/29/21 1344    Or    acetaminophen (TYLENOL) suppository 650 mg  650 mg Rectal Q6H PRANAHY Elias MD        insulin lispro (HUMALOG) injection vial 0-18 Units  0-18 Units SubCUTAneous TID WC Ady Elias MD   3 Units at 01/02/22 0720    insulin lispro (HUMALOG) injection vial 0-9 Units  0-9 Units SubCUTAneous Nightly Ady Elias MD   9 Units at 01/01/22 2101    HYDROcodone-acetaminophen ([place]otte [place]) 5-325 MG per tablet 1 tablet  1 tablet Oral Q6H NADIR Blankenship MD   1 tablet at 01/01/22 2112       Intake/Output Summary (Last 24 hours) at 1/2/2022 0819  Last data filed at 1/1/2022 6080  Gross per 24 hour   Intake 10 ml   Output --   Net 10 ml     Recent Results (from the past 24 hour(s))   POC Glucose Fingerstick    Collection Time: 01/01/22 11:14 AM   Result Value Ref Range    POC Glucose 260 (H) 65 - 105 mg/dL   POC Glucose Fingerstick    Collection Time: 01/01/22  4:20 PM   Result Value Ref Range    POC Glucose 278 (H) 65 - 105 mg/dL   Basic Metabolic Panel w/ Reflex to MG    Collection Time: 01/02/22  7:00 AM   Result Value Ref Range    Glucose 172 (H) 70 - 99 mg/dL    BUN 33 (H) 8 - 23 mg/dL    CREATININE 4.41 (H) 0.50 - 0.90 mg/dL    Bun/Cre Ratio NOT REPORTED 9 - 20    Calcium 8.9 8.6 - 10.4 mg/dL    Sodium 131 (L) 135 - 144 mmol/L    Potassium 4.3 3.7 - 5.3 mmol/L    Chloride 93 (L) 98 - 107 mmol/L    CO2 22 20 - 31 mmol/L    Anion Gap 16 9 - 17 mmol/L    GFR Non-African American 10 (L) >60 mL/min    GFR  12 (L) >60 mL/min    GFR Comment          GFR Staging NOT REPORTED      -----------------------------------------------------------------  RAD:  EKG:  Micro:     Assessment & Plan:    Patient Active Problem List:     Atherosclerosis of coronary artery bypass graft of native heart without angina pectoris     Acute renal failure (HCC)     Diabetes mellitus due to underlying condition with diabetic nephropathy, with long-term current use of insulin ([facility])     Chronic diastolic heart failure ([facility])     Diabetic polyneuropathy associated with type 2 diabetes mellitus ([facility])     History of coronary artery bypass graft     Iron deficiency anemia     Spinal stenosis of lumbar region with neurogenic claudication     Mixed hyperlipidemia     Stage 4 chronic kidney disease ([facility])     Type 2 diabetes mellitus with kidney complication, with long-term current use of insulin ([facility])     Syncope and collapse Obesity, Class II, BMI 35-39.9     Thyroid nodule greater than or equal to 1 cm in diameter incidentally noted on imaging study     Essential hypertension     Anemia in stage 4 chronic kidney disease (HCC)     Chronic ischemic heart disease     Ischemic stroke of frontal lobe (HCC)     Stroke-like symptoms     Morbid obesity with BMI of 40.0-44.9, adult (HCC)     Acute encephalopathy     Disequilibrium syndrome     Generalized weakness     Long-term memory loss     Muscle right arm weakness     Anxiety     Chronic midline low back pain with bilateral sciatica     Need for immunization against influenza     Altered mental status     Acute right-sided thoracic back pain     Tremor     Metabolic encephalopathy           Plan:  -Metabolic encephalopathy - mental status back at baseline. -ESRD on dialysis - Nephrology managing. -DM2 - stable.  -Emotional lability - consult Psychiatry. -D/C planning ongoing - ARU vs SNF.  -Continue current treatments.  -Complete orders per chart.     See orders   Disposition:    Electronically signed by Fredrick Plasencia MD on 1/2/2022 at 8:19 AM

## 2022-01-02 NOTE — PROGRESS NOTES
patient's sisters are here visiting patient and has many questions about plan of care, throat nodule, any neuorologist consult, reason for tremors, concerned about swelling in hands, lower legs, and feet, any concerns with swallowing difficulty at times, and any chance for psych consult to address emotional issues. Dr. Austin Penn notified of family's concerns,  Awaiting response.

## 2022-01-02 NOTE — PLAN OF CARE
Problem: Falls - Risk of:  Goal: Will remain free from falls  Description: Will remain free from falls  Outcome: Ongoing-standard fall precautions in place     Problem: Falls - Risk of:  Goal: Absence of physical injury  Description: Absence of physical injury  Outcome: Ongoing-     Problem: Pain:  Goal: Pain level will decrease  Description: Pain level will decrease  Outcome: Ongoing     Problem: Pain:  Goal: Control of acute pain  Description: Control of acute pain  Outcome: Ongoing-patient satisfied with pain control     Problem: Skin Integrity:  Goal: Will show no infection signs and symptoms  Description: Will show no infection signs and symptoms  Outcome: Ongoing-no new skin issues     Problem: Skin Integrity:  Goal: Absence of new skin breakdown  Description: Absence of new skin breakdown  Outcome: Ongoing

## 2022-01-03 LAB
ANION GAP SERPL CALCULATED.3IONS-SCNC: 14 MMOL/L (ref 9–17)
BUN BLDV-MCNC: 40 MG/DL (ref 8–23)
BUN/CREAT BLD: ABNORMAL (ref 9–20)
CALCIUM SERPL-MCNC: 9 MG/DL (ref 8.6–10.4)
CHLORIDE BLD-SCNC: 93 MMOL/L (ref 98–107)
CO2: 25 MMOL/L (ref 20–31)
CREAT SERPL-MCNC: 4.89 MG/DL (ref 0.5–0.9)
GFR AFRICAN AMERICAN: 11 ML/MIN
GFR NON-AFRICAN AMERICAN: 9 ML/MIN
GFR SERPL CREATININE-BSD FRML MDRD: ABNORMAL ML/MIN/{1.73_M2}
GFR SERPL CREATININE-BSD FRML MDRD: ABNORMAL ML/MIN/{1.73_M2}
GLUCOSE BLD-MCNC: 126 MG/DL (ref 65–105)
GLUCOSE BLD-MCNC: 144 MG/DL (ref 65–105)
GLUCOSE BLD-MCNC: 158 MG/DL (ref 70–99)
GLUCOSE BLD-MCNC: 203 MG/DL (ref 65–105)
GLUCOSE BLD-MCNC: 226 MG/DL (ref 65–105)
GLUCOSE BLD-MCNC: 234 MG/DL (ref 65–105)
GLUCOSE BLD-MCNC: 306 MG/DL (ref 65–105)
POTASSIUM SERPL-SCNC: 4.7 MMOL/L (ref 3.7–5.3)
SODIUM BLD-SCNC: 132 MMOL/L (ref 135–144)

## 2022-01-03 PROCEDURE — 6370000000 HC RX 637 (ALT 250 FOR IP): Performed by: FAMILY MEDICINE

## 2022-01-03 PROCEDURE — 82947 ASSAY GLUCOSE BLOOD QUANT: CPT

## 2022-01-03 PROCEDURE — 2500000003 HC RX 250 WO HCPCS: Performed by: INTERNAL MEDICINE

## 2022-01-03 PROCEDURE — 80048 BASIC METABOLIC PNL TOTAL CA: CPT

## 2022-01-03 PROCEDURE — APPSS60 APP SPLIT SHARED TIME 46-60 MINUTES: Performed by: PSYCHIATRY & NEUROLOGY

## 2022-01-03 PROCEDURE — 36415 COLL VENOUS BLD VENIPUNCTURE: CPT

## 2022-01-03 PROCEDURE — 6370000000 HC RX 637 (ALT 250 FOR IP): Performed by: INTERNAL MEDICINE

## 2022-01-03 PROCEDURE — G0378 HOSPITAL OBSERVATION PER HR: HCPCS

## 2022-01-03 PROCEDURE — 6370000000 HC RX 637 (ALT 250 FOR IP): Performed by: STUDENT IN AN ORGANIZED HEALTH CARE EDUCATION/TRAINING PROGRAM

## 2022-01-03 PROCEDURE — 99232 SBSQ HOSP IP/OBS MODERATE 35: CPT | Performed by: PHYSICAL MEDICINE & REHABILITATION

## 2022-01-03 PROCEDURE — 96372 THER/PROPH/DIAG INJ SC/IM: CPT

## 2022-01-03 PROCEDURE — 6370000000 HC RX 637 (ALT 250 FOR IP): Performed by: PSYCHIATRY & NEUROLOGY

## 2022-01-03 PROCEDURE — 6360000002 HC RX W HCPCS: Performed by: INTERNAL MEDICINE

## 2022-01-03 PROCEDURE — 90792 PSYCH DIAG EVAL W/MED SRVCS: CPT | Performed by: PSYCHIATRY & NEUROLOGY

## 2022-01-03 PROCEDURE — 90935 HEMODIALYSIS ONE EVALUATION: CPT

## 2022-01-03 PROCEDURE — 99225 PR SBSQ OBSERVATION CARE/DAY 25 MINUTES: CPT | Performed by: FAMILY MEDICINE

## 2022-01-03 RX ADMIN — GABAPENTIN 300 MG: 300 CAPSULE ORAL at 07:33

## 2022-01-03 RX ADMIN — POLYETHYLENE GLYCOL 3350 17 G: 17 POWDER, FOR SOLUTION ORAL at 07:36

## 2022-01-03 RX ADMIN — FEBUXOSTAT 40 MG: 40 TABLET, FILM COATED ORAL at 07:40

## 2022-01-03 RX ADMIN — TAMSULOSIN HYDROCHLORIDE 0.4 MG: 0.4 CAPSULE ORAL at 07:35

## 2022-01-03 RX ADMIN — DOCUSATE SODIUM 100 MG: 100 CAPSULE ORAL at 20:55

## 2022-01-03 RX ADMIN — CARVEDILOL 3.12 MG: 3.12 TABLET, FILM COATED ORAL at 17:05

## 2022-01-03 RX ADMIN — Medication 1.9 ML: at 12:01

## 2022-01-03 RX ADMIN — INSULIN GLARGINE 73 UNITS: 100 INJECTION, SOLUTION SUBCUTANEOUS at 07:32

## 2022-01-03 RX ADMIN — APIXABAN 5 MG: 5 TABLET, FILM COATED ORAL at 07:34

## 2022-01-03 RX ADMIN — INSULIN LISPRO 3 UNITS: 100 INJECTION, SOLUTION INTRAVENOUS; SUBCUTANEOUS at 21:40

## 2022-01-03 RX ADMIN — RANOLAZINE 1000 MG: 500 TABLET, FILM COATED, EXTENDED RELEASE ORAL at 07:33

## 2022-01-03 RX ADMIN — BUSPIRONE HYDROCHLORIDE 7.5 MG: 5 TABLET ORAL at 07:35

## 2022-01-03 RX ADMIN — CARVEDILOL 3.12 MG: 3.12 TABLET, FILM COATED ORAL at 07:33

## 2022-01-03 RX ADMIN — RANOLAZINE 1000 MG: 500 TABLET, FILM COATED, EXTENDED RELEASE ORAL at 20:54

## 2022-01-03 RX ADMIN — Medication 1.9 ML: at 12:00

## 2022-01-03 RX ADMIN — EPOETIN ALFA-EPBX 3000 UNITS: 3000 INJECTION, SOLUTION INTRAVENOUS; SUBCUTANEOUS at 23:18

## 2022-01-03 RX ADMIN — TIZANIDINE 2 MG: 2 TABLET ORAL at 21:14

## 2022-01-03 RX ADMIN — TIZANIDINE 2 MG: 2 TABLET ORAL at 14:17

## 2022-01-03 RX ADMIN — DOCUSATE SODIUM 100 MG: 100 CAPSULE ORAL at 07:34

## 2022-01-03 RX ADMIN — ATORVASTATIN CALCIUM 80 MG: 80 TABLET, FILM COATED ORAL at 07:35

## 2022-01-03 RX ADMIN — SODIUM BICARBONATE 650 MG: 650 TABLET ORAL at 21:14

## 2022-01-03 RX ADMIN — APIXABAN 5 MG: 5 TABLET, FILM COATED ORAL at 20:55

## 2022-01-03 RX ADMIN — ASPIRIN 81 MG: 81 TABLET, CHEWABLE ORAL at 07:34

## 2022-01-03 RX ADMIN — BUSPIRONE HYDROCHLORIDE 7.5 MG: 5 TABLET ORAL at 14:17

## 2022-01-03 RX ADMIN — BUSPIRONE HYDROCHLORIDE 7.5 MG: 5 TABLET ORAL at 20:54

## 2022-01-03 RX ADMIN — INSULIN LISPRO 6 UNITS: 100 INJECTION, SOLUTION INTRAVENOUS; SUBCUTANEOUS at 17:05

## 2022-01-03 RX ADMIN — INSULIN GLARGINE 73 UNITS: 100 INJECTION, SOLUTION SUBCUTANEOUS at 21:40

## 2022-01-03 RX ADMIN — TRAZODONE HYDROCHLORIDE 75 MG: 50 TABLET ORAL at 23:18

## 2022-01-03 RX ADMIN — GABAPENTIN 300 MG: 300 CAPSULE ORAL at 20:55

## 2022-01-03 RX ADMIN — SODIUM BICARBONATE 650 MG: 650 TABLET ORAL at 07:34

## 2022-01-03 RX ADMIN — PANTOPRAZOLE SODIUM 40 MG: 40 TABLET, DELAYED RELEASE ORAL at 06:39

## 2022-01-03 RX ADMIN — HYDROCODONE BITARTRATE AND ACETAMINOPHEN 1 TABLET: 5; 325 TABLET ORAL at 23:18

## 2022-01-03 RX ADMIN — TIZANIDINE 2 MG: 2 TABLET ORAL at 07:34

## 2022-01-03 RX ADMIN — ACETAMINOPHEN 650 MG: 325 TABLET, FILM COATED ORAL at 21:14

## 2022-01-03 ASSESSMENT — PAIN SCALES - GENERAL
PAINLEVEL_OUTOF10: 0
PAINLEVEL_OUTOF10: 6
PAINLEVEL_OUTOF10: 8
PAINLEVEL_OUTOF10: 0

## 2022-01-03 NOTE — PROGRESS NOTES
Went to pull pt's meds from [facility] and pt had been discharged. Spoke to [facility] in pharmacy who states that because the pt is observation and it has been over 6 days, she will continue to be discharged from the [facility] every morning. He will fix shortly but cannot get to it right now.

## 2022-01-03 NOTE — PROGRESS NOTES
FAMILY MEDICINE  - PROGRESS NOTE    Date:  1/3/2022  Vitor Ridgeway  [id number]      Chief Complaint   Patient presents with    Back Pain         Interval History:  not changed much, she has no new complaints this am. Her pain has resolved. No significant events overnight. Specialists notes, labs & imaging reviewed. Psychiatry was consulted but has not seen her yet.       Subjective  Constitutional: positive for fatigue and obesity  Musculoskeletal:positive for muscle weakness and myalgias  Neurological: positive for memory problems and tremors  Endocrine: positive for diabetic symptoms including neuropathy and hyperglycemia:    Objective:    BP (!) 127/57   Pulse 54   Temp 97.5 °F (36.4 °C) (Oral)   Resp 18   Ht 5' 5\" (1.651 m)   Wt 262 lb 5.6 oz (119 kg)   SpO2 (!) 88%   BMI 43.66 kg/m²   General appearance - overweight  Mental status - drowsy  Eyes - not examined  Ears - bilateral TM's and external ear canals normal  Nose - normal and patent, no erythema, discharge or polyps  Mouth - mucous membranes moist, pharynx normal without lesions  Neck - supple, no significant adenopathy  Lymphatics - no palpable lymphadenopathy, no hepatosplenomegaly  Chest - clear to auscultation, no wheezes, rales or rhonchi, symmetric air entry  Heart - normal rate, regular rhythm, normal S1, S2, no murmurs, rubs, clicks or gallops  Abdomen - soft, nontender, nondistended, no masses or organomegaly  Breasts - not examined  Back exam - not examined  Neurological - neck supple without rigidity  Musculoskeletal - no joint tenderness, deformity or swelling  Extremities - peripheral pulses normal, no pedal edema, no clubbing or cyanosis  Skin - normal coloration and turgor, no rashes, no suspicious skin lesions noted    Data:   Medications:   Current Facility-Administered Medications   Medication Dose Route Frequency Provider Last Rate Last Admin    epoetin raphael-epbx (RETACRIT) injection 3,000 Units  3,000 Units SubCUTAneous Once per day on Mon Wed Fri Domonique Templeton MD   3,000 Units at 12/31/21 1643    sodium bicarbonate tablet 650 mg  650 mg Oral BID Domonique Templeton MD   650 mg at 01/02/22 2037    carvedilol (COREG) tablet 3.125 mg  3.125 mg Oral BID WC Domonique Templeton MD   3.125 mg at 01/02/22 1811    anticoagulant sodium citrate 4 % injection 1.9 mL  1.9 mL IntraCATHeter PRN Domonique Templeton MD        anticoagulant sodium citrate 4 % injection 1.9 mL  1.9 mL IntraCATHeter PRN Domonique Templeton MD        tiZANidine (ZANAFLEX) tablet 4 mg  4 mg Oral Q6H PRN Tasha Hall MD   4 mg at 12/30/21 0123    tiZANidine (ZANAFLEX) tablet 2 mg  2 mg Oral TID Tasha Hall MD   2 mg at 01/02/22 2038    lidocaine 4 % external patch 1 patch  1 patch TransDERmal Daily Erika Carroll DO   1 patch at 01/02/22 4875    aspirin chewable tablet 81 mg  81 mg Oral Daily Kyle Saini MD   81 mg at 01/02/22 5283    atorvastatin (LIPITOR) tablet 80 mg  80 mg Oral Daily Kyle Saini MD   80 mg at 01/02/22 7356    busPIRone (BUSPAR) tablet 7.5 mg  7.5 mg Oral TID Kyle Saini MD   7.5 mg at 01/02/22 2039    docusate sodium (COLACE) capsule 100 mg  100 mg Oral BID Kyle Saini MD   100 mg at 01/02/22 2037    apixaban (ELIQUIS) tablet 5 mg  5 mg Oral BID Kyle Saini MD   5 mg at 01/02/22 2038    febuxostat (ULORIC) tablet 40 mg  40 mg Oral Daily Kyle Saini MD   40 mg at 01/02/22 0915    gabapentin (NEURONTIN) capsule 300 mg  300 mg Oral BID Kyle Saini MD   300 mg at 01/02/22 2037    insulin glargine (LANTUS) injection vial 73 Units  73 Units SubCUTAneous BID Kyle Saini MD   73 Units at 01/02/22 2039    pantoprazole (PROTONIX) tablet 40 mg  40 mg Oral QAM AC Kyle Saini MD   40 mg at 01/02/22 6889    polyethylene glycol (GLYCOLAX) packet 17 g  17 g Oral Daily Kyle Saini MD   17 g at 01/02/22 0340    ranolazine (RANEXA) extended release tablet 1,000 mg  1,000 mg Oral BID Avie Overall, MD   1,000 mg at 01/02/22 2038    senna (SENOKOT) tablet 17.2 mg  2 tablet Oral Daily PRN Avie Overall, MD        tamsulosin [facility]) capsule 0.4 mg  0.4 mg Oral Daily Piotre Overall, MD   0.4 mg at 01/02/22 1866    traZODone (DESYREL) tablet 75 mg  75 mg Oral Nightly Avie Overall, MD   75 mg at 01/02/22 2036    glucose (GLUTOSE) 40 % oral gel 15 g  15 g Oral PRN Avie Overall, MD        dextrose 50 % IV solution  12.5 g IntraVENous PRN Avie Overall, MD        glucagon (rDNA) injection 1 mg  1 mg IntraMUSCular PRN Avie Overall, MD        dextrose 5 % solution  100 mL/hr IntraVENous PRN Avie Overall, MD        sodium chloride flush 0.9 % injection 5-40 mL  5-40 mL IntraVENous 2 times per day Elan Overall, MD   10 mL at 01/01/22 2218    sodium chloride flush 0.9 % injection 5-40 mL  5-40 mL IntraVENous PRN Piotre Overall, MD        0.9 % sodium chloride infusion  25 mL IntraVENous PRN Piotre Overall, MD        ondansetron (ZOFRAN-ODT) disintegrating tablet 4 mg  4 mg Oral Q8H PRN Avie Overall, MD        Or    ondansetron [facility]) injection 4 mg  4 mg IntraVENous Q6H PRN Avie Overall, MD        polyethylene glycol [facility]) packet 17 g  17 g Oral Daily PRN Avie Overall, MD        acetaminophen (TYLENOL) tablet 650 mg  650 mg Oral Q6H PRN Avie Overall, MD   650 mg at 12/29/21 1344    Or    acetaminophen (TYLENOL) suppository 650 mg  650 mg Rectal Q6H PRN Avie Overall, MD        insulin lispro (HUMALOG) injection vial 0-18 Units  0-18 Units SubCUTAneous TID WC Piotre Overall, MD   12 Units at 01/02/22 1655    insulin lispro (HUMALOG) injection vial 0-9 Units  0-9 Units SubCUTAneous Nightly Piotre Overall, MD   3 Units at 01/02/22 2039    HYDROcodone-acetaminophen ([address]) 5-325 MG per tablet 1 tablet  1 tablet Oral Q6H NADIR Osorio MD   1 tablet at 01/01/22 2112     No intake or output data in the 24 hours ending 01/03/22 5421  Recent Results (from the past 24 hour(s))   POC Glucose Fingerstick    Collection Time: 01/02/22  6:31 AM   Result Value Ref Range    POC Glucose 152 (H) 65 - 105 mg/dL   Basic Metabolic Panel w/ Reflex to MG    Collection Time: 01/02/22  7:00 AM   Result Value Ref Range    Glucose 172 (H) 70 - 99 mg/dL    BUN 33 (H) 8 - 23 mg/dL    CREATININE 4.41 (H) 0.50 - 0.90 mg/dL    Bun/Cre Ratio NOT REPORTED 9 - 20    Calcium 8.9 8.6 - 10.4 mg/dL    Sodium 131 (L) 135 - 144 mmol/L    Potassium 4.3 3.7 - 5.3 mmol/L    Chloride 93 (L) 98 - 107 mmol/L    CO2 22 20 - 31 mmol/L    Anion Gap 16 9 - 17 mmol/L    GFR Non-African American 10 (L) >60 mL/min    GFR  12 (L) >60 mL/min    GFR Comment          GFR Staging NOT REPORTED    POC Glucose Fingerstick    Collection Time: 01/02/22 10:57 AM   Result Value Ref Range    POC Glucose 273 (H) 65 - 105 mg/dL     -----------------------------------------------------------------  RAD:  EKG:  Micro:     Assessment & Plan:    Patient Active Problem List:     Atherosclerosis of coronary artery bypass graft of native heart without angina pectoris     Acute renal failure (HCC)     Diabetes mellitus due to underlying condition with diabetic nephropathy, with long-term current use of insulin (HCC)     Chronic diastolic heart failure (HCC)     Diabetic polyneuropathy associated with type 2 diabetes mellitus (HCC)     History of coronary artery bypass graft     Iron deficiency anemia     Spinal stenosis of lumbar region with neurogenic claudication     Mixed hyperlipidemia     Stage 4 chronic kidney disease (HCC)     Type 2 diabetes mellitus with kidney complication, with long-term current use of insulin (HCC)     Syncope and collapse     Obesity, Class II, BMI 35-39.9     Thyroid nodule greater than or equal to 1 cm in diameter incidentally noted on imaging study     Essential hypertension     Anemia in stage 4 chronic kidney disease (HCC)     Chronic ischemic heart disease     Ischemic stroke of frontal lobe (HCC)     Stroke-like symptoms     Morbid obesity with BMI of 40.0-44.9, adult (HCC)     Acute encephalopathy     Disequilibrium syndrome     Generalized weakness     Long-term memory loss     Muscle right arm weakness     Anxiety     Chronic midline low back pain with bilateral sciatica     Need for immunization against influenza     Altered mental status     Acute right-sided thoracic back pain     Tremor     Metabolic encephalopathy           Plan:  -Metabolic encephalopathy - mental status back at baseline. -ESRD on dialysis - Nephrology managing. -DM2 - stable.  -Emotional lability - awaiting Psychiatry input. -D/C planning ongoing - ARU vs SNF.  -Continue current treatments.  -Complete orders per chart.     See orders   Disposition:    Electronically signed by Corine Espinosa MD on 1/3/2022 at 6:19 AM

## 2022-01-03 NOTE — PROGRESS NOTES
HEMODIALYSIS PRE-TREATMENT NOTE    Patient Identifiers prior to treatment: Name and      Isolation Required: na                      Isolation Type: na       (please document if patient is being managed as a PUI/COVID-19 patient)        Hepatitis status:                           Date Drawn                             Result  Hepatitis B Surface Antigen 2021     neg                     Hepatitis B Surface Antibody 2021 48.51        Hepatitis B Core Antibody 2021 neg          How was Hepatitis Status verified: Epic     Was a copy of the labs you documented provided to facility for the patient's chart: yes    Hemodialysis orders verified: yes    Access Within normal limits ( I.e. s/s of infection,...): yes     Pre-Assessment completed: yes    Pre-dialysis report received from: [facility]                   Time: 8820

## 2022-01-03 NOTE — PLAN OF CARE
Problem: Falls - Risk of:  Goal: Will remain free from falls  Description: Will remain free from falls  1/3/2022 0422 by Funmilayo Hickman RN  Outcome: Ongoing     Problem: Falls - Risk of:  Goal: Absence of physical injury  Description: Absence of physical injury  1/3/2022 0422 by Funmilayo Hickman RN  Outcome: Ongoing     Problem: Pain:  Goal: Pain level will decrease  Description: Pain level will decrease  1/3/2022 0422 by Funmilayo Hickman RN  Outcome: Ongoing     Problem: Pain:  Goal: Control of acute pain  Description: Control of acute pain  1/3/2022 0422 by Funmilayo Hickman RN  Outcome: Ongoing     Problem: Skin Integrity:  Goal: Will show no infection signs and symptoms  Description: Will show no infection signs and symptoms  1/3/2022 0422 by Funmilayo Hickman RN  Outcome: Ongoing     Problem: Skin Integrity:  Goal: Absence of new skin breakdown  Description: Absence of new skin breakdown  1/3/2022 0422 by Funmilayo Hickman RN  Outcome: Ongoing

## 2022-01-03 NOTE — CARE COORDINATION
BONNIE met with patient and confirmed she still wanted to try her new insurance to see if [facility] would approve for her to go to [facility]U instead of [facility] since our ARU is still her first choice. Notified Fiona NOT to continue the pre-cert and Jennifer will continue with pre-cert in ARU. Patient presented somewhat tearful today and shared with BONNIE she is ready to move forward and get better. She's frustrated with the process but understands staff are advocating for her. BONNIE contacted patient registration and requested patient's insurance be updated in Epic. They stated they do not come and meet with patient and will need her new insurance information faxed to [phone].

## 2022-01-03 NOTE — PROGRESS NOTES
Department of Internal Medicine  Nephrology Danielle June MD   Progress Note    Reason for consultation: Management of hemodialysis dependent end-stage renal disease. Consulting physician: Gay Ventura MD    Interval history:   Patient was seen and examined today,Tolerated dialysis today with 2.5 kg removed. Patient complains of chronic back pain. Blood pressure is stable and she denies dyspnea at rest  History of present illness: This is a 59 y.o. female with a significant past medical history of Chronic atrial fibrillation [on Eliquis], Type 2 diabetes mellitus, essential systemic hypertension, coronary artery disease [s/p CABG in 2004 and PTCA in 2019], heart failure with preserved ejection fraction [LVEF 55%] and end-stage renal disease secondary to diabetic and hypertensive nephropathy [on routine hemodialysis on a Monday/Wednesday/Friday schedule at [address] dialysis unit on [facility] under the care of Dr. Avelino Cazares since August 2001 using IJ tunneled dialysis catheter], who presented to the hospital via EMS for evaluation of worsening low back pain rated 10/10. She was seen and examined this morning and she remains quite lethargic and stuporous and difficult to arouse. She however is not in acute respiratory distress.     Scheduled Meds:   epoetin raphael-epbx  3,000 Units SubCUTAneous Once per day on Mon Wed Fri    sodium bicarbonate  650 mg Oral BID    carvedilol  3.125 mg Oral BID WC    tiZANidine  2 mg Oral TID    lidocaine  1 patch TransDERmal Daily    aspirin  81 mg Oral Daily    atorvastatin  80 mg Oral Daily    busPIRone  7.5 mg Oral TID    docusate sodium  100 mg Oral BID    apixaban  5 mg Oral BID    febuxostat  40 mg Oral Daily    gabapentin  300 mg Oral BID    insulin glargine  73 Units SubCUTAneous BID    pantoprazole  40 mg Oral QAM AC    polyethylene glycol  17 g Oral Daily    ranolazine  1,000 mg Oral BID    tamsulosin  0.4 mg Oral Daily    traZODone  75 mg Oral Nightly    sodium chloride flush  5-40 mL IntraVENous 2 times per day    insulin lispro  0-18 Units SubCUTAneous TID WC    insulin lispro  0-9 Units SubCUTAneous Nightly     Continuous Infusions:   dextrose      sodium chloride       PRN Meds:.anticoagulant sodium citrate, anticoagulant sodium citrate, tiZANidine, senna, glucose, dextrose, glucagon (rDNA), dextrose, sodium chloride flush, sodium chloride, ondansetron **OR** ondansetron, polyethylene glycol, acetaminophen **OR** acetaminophen, HYDROcodone 5 mg - acetaminophen    Physical Exam:    VITALS:  BP (!) 108/36   Pulse 69   Temp 98 °F (36.7 °C)   Resp 18   Ht 5' 5\" (1.651 m)   Wt 262 lb 9.1 oz (119.1 kg)   SpO2 95%   BMI 43.69 kg/m²   24HR INTAKE/OUTPUT:      Intake/Output Summary (Last 24 hours) at 1/3/2022 1253  Last data filed at 1/3/2022 1238  Gross per 24 hour   Intake 740 ml   Output 2500 ml   Net -1760 ml       Constitutional: slowed mentation and Lethargic and difficult to arouse    Skin: Skin color, texture, turgor normal. No rashes or lesions    Head: Normocephalic, without obvious abnormality, atraumatic     Cardiovascular/Edema: regular rate and rhythm, S1, S2 normal, no murmur, click, rub or gallop    Respiratory: Lungs: clear to auscultation bilaterally    Abdomen: soft, non-tender; bowel sounds normal; no masses,  no organomegaly    Back: symmetric, no curvature. ROM normal. No CVA tenderness. Extremities: edema +    Neuro:  Stuporous and lethargic. CBC:   No results for input(s): WBC, HGB, PLT in the last 72 hours.   BMP:    Recent Labs     01/01/22  0717 01/02/22  0700 01/03/22  0631   * 131* 132*   K 4.0 4.3 4.7   CL 93* 93* 93*   CO2 26 22 25   BUN 22 33* 40*   CREATININE 3.12* 4.41* 4.89*   GLUCOSE 106* 172* 158*     Lab Results   Component Value Date    NITRU NEGATIVE 11/14/2021    COLORU Yellow 11/14/2021    PHUR 5.0 11/14/2021    WBCUA 5 TO 10 11/14/2021    RBCUA 0 TO 2 11/14/2021    MUCUS NOT REPORTED 11/14/2021    TRICHOMONAS NOT REPORTED 11/14/2021    YEAST FEW 11/14/2021    BACTERIA MANY 11/14/2021    SPECGRAV 1.014 11/14/2021    LEUKOCYTESUR TRACE 11/14/2021    UROBILINOGEN Normal 11/14/2021    BILIRUBINUR NEGATIVE 11/14/2021    GLUCOSEU 3+ 11/14/2021    KETUA NEGATIVE 11/14/2021    AMORPHOUS NOT REPORTED 11/14/2021     Urine Sodium:     Lab Results   Component Value Date    JASON 47 11/14/2021     Urine Chloride:    Lab Results   Component Value Date    CLUR 26 11/14/2021     Urine Protein:     Lab Results   Component Value Date    TPU 20 11/12/2021     Urine Creatinine:     Lab Results   Component Value Date    LABCREA 72.0 11/14/2021     IMPRESSION/RECOMMENDATIONS:      1.  End-stage renal disease - we will maintain MWF hemodialysis schedule. Vascular access is IJ tunneled hemodialysis catheter. Renal diet,i.e 2-gram sodium,2-gram potassium,1500 ml fluid restriction,1-gram phosphorus, 1800 KCal and 1.2 gram protein per day. 2.  Altered mental status - rule out narcotic analgesia overdosage. blood glucose is within normal range. Sepsis will be ruled out also. Mental status is back to baseline. 3.   Normocytic anemia of chronic kidney disease - hemoglobin 10.4 g/dL is within target range. 4.  Systemic hypertension - blood pressure control is adequate. Plan  Continue hemodialysis Monday Wednesday Friday  No objection on transfer to acute rehab  Check basic metabolic panel and CBC on dialysis days          Prognosis is guarded.     Donovan Vaz MD   Attending Nephrologist  1/3/2022 12:53 PM

## 2022-01-03 NOTE — PROGRESS NOTES
Notified Keren Kasper, that MMO had denied ARU and no P2P was completed as pt's insurance changed to [employer] on January 1st.  Jaky Ortiz states ok to initiate precert for [facility] with new insurance. Writer noted Balbir Adam @ [address] [place] [address] was contacted to initiate precert, and writer called Jaky Ortiz to clarify d/c plan. Jaky Ortiz states she will discuss with pt and notify writer of plan.

## 2022-01-03 NOTE — PROGRESS NOTES
[facility]   OCCUPATIONAL THERAPY MISSED TREATMENT NOTE   INPATIENT   Date: 1/3/22  Patient Name: Emelina Painting       Room: 271/5898-94  MRN: [id number]   Account #: [de-identified]    : 1960  (59 y.o.)  Gender: female   Referring Practitioner: Saqib Granger MD  Diagnosis: Acute right-sided thoracic back pain              REASON FOR MISSED TREATMENT:   Other - Pt out of room at dialysis. Will check back as time permits.         RADHA Aguila/JAYLIN

## 2022-01-03 NOTE — CARE COORDINATION
DISCHARGE PLANNING NOTE:    Advised by Manfred Files from [address] [place] [address] that in the event pt goes home, they are unable to accept her back as they do not take her new [employer].     Electronically signed by Joshua Holloway RN on 1/3/2022 at 2:17 PM

## 2022-01-03 NOTE — CARE COORDINATION
ARU has denied patient. SW has contacted Balbir Adam at [facility] to notify and start pre-cert.

## 2022-01-03 NOTE — FLOWSHEET NOTE
01/02/22 1303   Encounter Summary   Services provided to: Patient   Referral/Consult From: [facility]   (1/2/22V)   Volunteer Visit Yes   Complexity of Encounter Low   Length of Encounter 15 minutes   Spiritual/Mormon   Type Spiritual support   Intervention Communion   Sacraments   Communion Patient received communion

## 2022-01-03 NOTE — PROGRESS NOTES
HEMODIALYSIS POST TREATMENT NOTE    Treatment time ordered: 180    Actual treatment time: 180    UltraFiltration Goal: 2500  UltraFiltration Removed: 2500      Pre Treatment weight: 121.3  Post Treatment weight: 119.1  Estimated Dry Weight: unknown    Access used:     Central Venous Catheter:          Tunneled or Non-tunneled: Tunneled           Site: Rt Chest          Access Flow: Good      Internal Access:       AV Fistula or AV Graft: na         Site: na       Access Flow: na       Sign and symptoms of infection: no       If YES: na    Medications or blood products given: none    Chronic outpatient schedule: MWF    Chronic outpatient unit: [facility]    Summary of response to treatment: PT tolerated very well and no complications     Explain if orders NOT met, was physician notified:trinh      ACES flowsheet faxed to patient unit/ placed in patient chart: yes    Post assessment completed: yes    Report given to: Rogers       * Intra-treatment documented Safety Checks include the followin) Access and face visible at all times. 2) All connections and blood lines are secure with no kinks. 3) NVL alarm engaged. 4) Hemosafe device applied (if applicable). 5) No collapse of Arterial or Venous blood chambers. 6) All blood lines / pump segments in the air detectors.
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
## 2022-01-03 NOTE — PROGRESS NOTES
Occupational Therapy        [x] [facility]) @ [facility]  [address] [place], [address] [address]  Phone [phone]  Fax [phone]    Occupational Therapy Discharge Note    Date: 1/3/2022      Patient: Jon Darnell  : 1960  MRN: [id number]    Physician:Zulma Payne APRN- CNP    Diagnosis: long term memory loss (R41.3), ischemic stroke of routine frontal lobe (I63.9) , muscle rt arm weakness (M62.81)  Onset Date: 2021   Rehab Diagnosis:rt UE weakness, impaired coordination, impaired sensation rt hand, swelling rt hand (fingers) (M62.81,R27.9,R20.2  Total visits attended:1  Cancels/No shows:0  Date of initial visit: 2021             [] Patient recovered from conditions. Treatment goals were met. [] Patient received maximum benefit. No further therapy indicated at this time. [] Patient demonstrated improvement from condition with  ** Of  ** Short term goals met. []Patient demonstrated improvement from condition with **   Of **  Long term goals met. [] Patient to continue exercise/home instructions independently. [] Therapy interrupted due to:    [] Patient has 2 or more no shows/cancels, is discontinued per our policy. [] Patient has completed prescribed number of treatment sessions. [x] Other: OTR/L was informed 1-3-2022 that pt had been admitted to the hospital in December and was still in the hospital. Pt seen for out pt OT eval on 2021 and pt wasn't seen after that time. OT goals unmet. Pain level at evaluation was    5   /10 and at discharge was    5  /10  It Is My Understanding That The:  [] Patient returned to work. [] Patient demonstrated improved level of function. [] Patient returned to previous functional level.   [x] Patient's current functional status is unknown due to pt being in the hospital.   Recommendations/Comments:   Treatment Included[x] Therapeutic Exercise [id number]    If you have any questions or concerns regarding this patient's care, please contact us.    Thank you for your referral.      Electronically signed by: Sam Jones OT

## 2022-01-04 ENCOUNTER — HOSPITAL ENCOUNTER (OUTPATIENT)
Dept: OCCUPATIONAL THERAPY | Age: 62
Setting detail: THERAPIES SERIES
Discharge: HOME | End: 2022-01-04

## 2022-01-04 LAB
GLUCOSE BLD-MCNC: 121 MG/DL (ref 65–105)
GLUCOSE BLD-MCNC: 221 MG/DL (ref 65–105)
GLUCOSE BLD-MCNC: 226 MG/DL (ref 65–105)
GLUCOSE BLD-MCNC: 92 MG/DL (ref 65–105)

## 2022-01-04 PROCEDURE — 6370000000 HC RX 637 (ALT 250 FOR IP): Performed by: FAMILY MEDICINE

## 2022-01-04 PROCEDURE — 6370000000 HC RX 637 (ALT 250 FOR IP): Performed by: INTERNAL MEDICINE

## 2022-01-04 PROCEDURE — G0378 HOSPITAL OBSERVATION PER HR: HCPCS

## 2022-01-04 PROCEDURE — 97110 THERAPEUTIC EXERCISES: CPT

## 2022-01-04 PROCEDURE — 6370000000 HC RX 637 (ALT 250 FOR IP): Performed by: STUDENT IN AN ORGANIZED HEALTH CARE EDUCATION/TRAINING PROGRAM

## 2022-01-04 PROCEDURE — 99225 PR SBSQ OBSERVATION CARE/DAY 25 MINUTES: CPT | Performed by: FAMILY MEDICINE

## 2022-01-04 PROCEDURE — 82947 ASSAY GLUCOSE BLOOD QUANT: CPT

## 2022-01-04 PROCEDURE — 97116 GAIT TRAINING THERAPY: CPT

## 2022-01-04 PROCEDURE — 6370000000 HC RX 637 (ALT 250 FOR IP): Performed by: PSYCHIATRY & NEUROLOGY

## 2022-01-04 RX ADMIN — FEBUXOSTAT 40 MG: 40 TABLET, FILM COATED ORAL at 08:04

## 2022-01-04 RX ADMIN — GABAPENTIN 300 MG: 300 CAPSULE ORAL at 22:02

## 2022-01-04 RX ADMIN — BUSPIRONE HYDROCHLORIDE 7.5 MG: 5 TABLET ORAL at 13:27

## 2022-01-04 RX ADMIN — INSULIN GLARGINE 73 UNITS: 100 INJECTION, SOLUTION SUBCUTANEOUS at 08:06

## 2022-01-04 RX ADMIN — POLYETHYLENE GLYCOL 3350 17 G: 17 POWDER, FOR SOLUTION ORAL at 08:05

## 2022-01-04 RX ADMIN — INSULIN LISPRO 2 UNITS: 100 INJECTION, SOLUTION INTRAVENOUS; SUBCUTANEOUS at 22:03

## 2022-01-04 RX ADMIN — ATORVASTATIN CALCIUM 80 MG: 80 TABLET, FILM COATED ORAL at 08:00

## 2022-01-04 RX ADMIN — RANOLAZINE 1000 MG: 500 TABLET, FILM COATED, EXTENDED RELEASE ORAL at 07:58

## 2022-01-04 RX ADMIN — TIZANIDINE 2 MG: 2 TABLET ORAL at 13:27

## 2022-01-04 RX ADMIN — DOCUSATE SODIUM 100 MG: 100 CAPSULE ORAL at 22:02

## 2022-01-04 RX ADMIN — SODIUM BICARBONATE 650 MG: 650 TABLET ORAL at 22:02

## 2022-01-04 RX ADMIN — TRAZODONE HYDROCHLORIDE 75 MG: 50 TABLET ORAL at 22:02

## 2022-01-04 RX ADMIN — INSULIN GLARGINE 73 UNITS: 100 INJECTION, SOLUTION SUBCUTANEOUS at 22:04

## 2022-01-04 RX ADMIN — SODIUM BICARBONATE 650 MG: 650 TABLET ORAL at 08:00

## 2022-01-04 RX ADMIN — APIXABAN 5 MG: 5 TABLET, FILM COATED ORAL at 22:02

## 2022-01-04 RX ADMIN — BUSPIRONE HYDROCHLORIDE 7.5 MG: 5 TABLET ORAL at 22:01

## 2022-01-04 RX ADMIN — TAMSULOSIN HYDROCHLORIDE 0.4 MG: 0.4 CAPSULE ORAL at 08:03

## 2022-01-04 RX ADMIN — ASPIRIN 81 MG: 81 TABLET, CHEWABLE ORAL at 08:00

## 2022-01-04 RX ADMIN — BUSPIRONE HYDROCHLORIDE 7.5 MG: 5 TABLET ORAL at 07:59

## 2022-01-04 RX ADMIN — CARVEDILOL 3.12 MG: 3.12 TABLET, FILM COATED ORAL at 16:56

## 2022-01-04 RX ADMIN — INSULIN LISPRO 6 UNITS: 100 INJECTION, SOLUTION INTRAVENOUS; SUBCUTANEOUS at 16:56

## 2022-01-04 RX ADMIN — GABAPENTIN 300 MG: 300 CAPSULE ORAL at 07:59

## 2022-01-04 RX ADMIN — TIZANIDINE 2 MG: 2 TABLET ORAL at 22:02

## 2022-01-04 RX ADMIN — CARVEDILOL 3.12 MG: 3.12 TABLET, FILM COATED ORAL at 08:03

## 2022-01-04 RX ADMIN — TIZANIDINE 2 MG: 2 TABLET ORAL at 07:59

## 2022-01-04 RX ADMIN — APIXABAN 5 MG: 5 TABLET, FILM COATED ORAL at 08:00

## 2022-01-04 RX ADMIN — RANOLAZINE 1000 MG: 500 TABLET, FILM COATED, EXTENDED RELEASE ORAL at 22:01

## 2022-01-04 RX ADMIN — INSULIN LISPRO 6 UNITS: 100 INJECTION, SOLUTION INTRAVENOUS; SUBCUTANEOUS at 11:32

## 2022-01-04 RX ADMIN — PANTOPRAZOLE SODIUM 40 MG: 40 TABLET, DELAYED RELEASE ORAL at 08:00

## 2022-01-04 RX ADMIN — DOCUSATE SODIUM 100 MG: 100 CAPSULE ORAL at 08:00

## 2022-01-04 ASSESSMENT — PAIN SCALES - GENERAL
PAINLEVEL_OUTOF10: 0
PAINLEVEL_OUTOF10: 2

## 2022-01-04 ASSESSMENT — PAIN DESCRIPTION - ORIENTATION: ORIENTATION: RIGHT

## 2022-01-04 ASSESSMENT — PAIN DESCRIPTION - LOCATION: LOCATION: SHOULDER

## 2022-01-04 ASSESSMENT — PAIN DESCRIPTION - PAIN TYPE: TYPE: ACUTE PAIN

## 2022-01-04 NOTE — CONSULTS
Department of Psychiatry   Psychiatric Assessment    Thank you very much for allowing us to participate in the care of this patient.       Reason for Consult:  \"Emotional lability\".           HISTORY OF PRESENT ILLNESS:       The patient is a 59 y.o. female admitted for right thoracic pain. Psychiatry was consulted for \"emotional lability\". Patient reports that she has been feeling increasingly depressed secondary to her being here in the hospital.  Denies any active suicidal ideation plan or intent. She does report that she has been dealing with increasing anxiety and some panic attacks here. Reports dealing with some vague visual hallucinations however denies any today. Per Care coordination note from Vaughn Alcantar CNP:    Patient was seen today in room 2063-1. Patient reports she has been feeling depressed for couple days, but denies feeling helpless, hopeless, worthless. When asked about if he is experiencing anhedonia, patient reports she is unable to participate in things she enjoys due to medical reasons. She endorses poor energy. Patient identifies her stressor as her hospitalization. She denies suicidal ideation, intent, or plans. She denies homicidal ideation, intent, or plans. She contracts for safety in the hospital. She endorses anxiety. She endorses panic attacks and reports she feels \"very weak\", \"shaky\", and cries when she experiences panic attacks. She denies chance/hypomania symptoms. She denies impulsivity. Patient endorses a phobia of spiders. She denies obsessions or compulsions. When asked about body or vocal tics, patient reports she has spasm of her hands.     When asked about visual hallucinations, patient reports she was sleeping and stated \"I thought there was a mirror\" and reports she was waving at a friend and reports she woke up to herself waving.  Patient was unable to identify if it was a dream. When writer further elaborated for assessment questions pertaining to admission. Patient has plans to follow-up with psychiatric services outpatient. Patient is able to contract for her safety in the hospital.  Recommend continuation of current psychiatric medications. Recommend following up with psychiatric services outpatient.            History of head trauma: [x]? Yes []? No  Patient endorses history of head trauma and reports she had a fall where she hit her head approximately 6 years ago or \"longer\".       History of seizures: []? Yes [x]? No      History of violence or aggression: []? Yes [x]? No            PSYCHIATRIC HISTORY:     · Outpatient psychiatric provider: Patient reports she does not currently follow with any psychiatric services or providers but reports she had an appointment scheduled at [facility] for this Thursday. Patient was not able to recall the name of the provider. Patient reports she plans to follow-up with psychiatric services outpatient. · Suicide attempts: Patient denies any lifetime suicide attempts. · Inpatient psychiatric admissions: Patient denies any previous psychiatric hospital admissions.     Past psychiatric medications includes:      Gabapentin and Buspar.     Adverse reactions from psychotropic medications:     Patient denies any adverse reactions from psychotropic medications.         Lifetime Psychiatric Review of Systems          Depression: Endorses     Anxiety: Endorses     Panic Attacks: Endorses     Bianka or Hypomania: Denies     Phobias: Denies     Obsessions and Compulsions: Denies     Visual Hallucinations: When asked about visual hallucinations, patient reports she was sleeping and stated \"I thought there was a mirror\" and reports she was waving at a friend and reports she woke up to herself waving. Patient was unable to identify if it was a dream. When writer further elaborated for assessment questions pertaining to visual hallucinations, patient denied any symptoms of visual hallucinations.       Auditory Hallucinations: Denies     Delusions/Paranoia: Denies     PTSD: Denies     Trauma:  Endorses            Home Medications           Prior to Admission medications    Medication Sig Start Date End Date Taking? Authorizing Provider   tiZANidine (ZANAFLEX) 4 MG tablet Take 1 tablet by mouth every 6 hours as needed (spasm) 12/28/21   Yes Alexandro Somers MD   insulin glargine [facility]) 100 UNIT/ML injection pen Inject 73 Units into the skin 2 times daily 11/30/21   Yes AFSANEH Sheth CNP   insulin aspart (NOVOLOG FLEXPEN) 100 UNIT/ML injection pen Sliding scale three times daily with meals as follows:  Glucose <139  No Insulin; 140-199  3 Units; 200-249  6 Units; 250-299  9 Units; 300-349  12 Units; 350-400  15 Units; Above 400  18 Units 11/23/21   Yes AFSANEH Sheth CNP   ELIQUIS 5 MG TABS tablet Take 1 tablet by mouth 2 times daily 11/22/21   Yes Glenn Nickerson MD   gabapentin (NEURONTIN) 300 MG capsule Take 1 capsule by mouth 2 times daily for 30 days.  11/22/21 12/24/21 Yes Glenn Nickerson MD   traZODone (DESYREL) 50 MG tablet Take 1.5 tablets by mouth nightly 11/22/21   Yes Glenn Nickerson MD   furosemide (LASIX) 80 MG tablet Take 1 tablet by mouth 2 times daily 11/22/21   Yes Glenn Nickerson MD   febuxostat (ULORIC) 40 MG TABS tablet Take 1 tablet by mouth daily 11/22/21   Yes Glenn Nickerson MD   docusate sodium (COLACE) 100 MG capsule Take 1 capsule by mouth 2 times daily 11/22/21   Yes Glenn Nickerson MD   tamsulosin [facility]) 0.4 MG capsule Take 1 capsule by mouth daily 11/23/21   Yes Glenn Nickerson MD   atorvastatin (LIPITOR) 80 MG tablet Take 1 tablet by mouth daily 11/3/21   Yes AFSANEH Sheth CNP   ranolazine (RANEXA) 1000 MG extended release tablet Take 1 tablet by mouth 2 times daily 9/25/21   Yes Kev Romeo MD   busPIRone (BUSPAR) 7.5 MG tablet Take 1 tablet by mouth 3 times daily 9/25/21   Yes Kev Romeo MD   carvedilol (COREG) 6.25 MG tablet Take 1 tablet by mouth 2 times daily 9/25/21   Yes Sonam Mullins MD   pantoprazole (PROTONIX) 40 MG tablet Take 1 tablet by mouth every morning (before breakfast) 9/25/21   Yes Sonam Mullins MD   senna (SENOKOT) 8.6 MG tablet Take 2 tablets by mouth daily as needed (Constipation) 8/25/21   Yes Sonam Mullins MD   Insulin Pen Needle ([employer] PEN NEEDLES) 31G X 6 MM MISC 1 each by Does not apply route 5 times daily 12/23/21 3/23/22   AFSANEH Rosa CNP   HUMALOG 100 UNIT/ML injection vial   11/29/21     Historical Provider, MD   blood glucose test strips (TRUE METRIX BLOOD GLUCOSE TEST) strip 1 each by In Vitro route daily As needed.  11/30/21     AFSANEH Rosa CNP   Blood Glucose Monitoring Suppl (TRUE METRIX GO GLUCOSE METER) w/Device KIT 1 each by Does not apply route 4 times daily 11/30/21     AFSANEH Rosa CNP   Lancet Device MISC 1 Device by Does not apply route once for 1 dose 11/30/21 11/30/21   AFSANEH Rosa CNP   Lancets MISC 1 each by Does not apply route daily 11/30/21     AFSANEH Rosa CNP   aspirin 81 MG chewable tablet Take 1 tablet by mouth daily 9/25/21     Sonam Mullins MD   allopurinol (ZYLOPRIM) 100 MG tablet Take 1 tablet by mouth daily 4/14/21     AFSANEH Rosa CNP            Medications:      Current Hospital Medications   Current Facility-Administered Medications: epoetin raphael-epbx (RETACRIT) injection 3,000 Units, 3,000 Units, SubCUTAneous, Once per day on Mon Wed Fri  sodium bicarbonate tablet 650 mg, 650 mg, Oral, BID  carvedilol (COREG) tablet 3.125 mg, 3.125 mg, Oral, BID WC  anticoagulant sodium citrate 4 % injection 1.9 mL, 1.9 mL, IntraCATHeter, PRN  anticoagulant sodium citrate 4 % injection 1.9 mL, 1.9 mL, IntraCATHeter, PRN  tiZANidine (ZANAFLEX) tablet 4 mg, 4 mg, Oral, Q6H PRN  tiZANidine (ZANAFLEX) tablet 2 mg, 2 mg, Oral, TID  lidocaine 4 % external patch 1 patch, 1 patch, TransDERmal, Daily  aspirin chewable tablet 81 mg, 81 mg, Oral, Daily  atorvastatin (LIPITOR) tablet 80 mg, 80 mg, Oral, Daily  busPIRone (BUSPAR) tablet 7.5 mg, 7.5 mg, Oral, TID  docusate sodium (COLACE) capsule 100 mg, 100 mg, Oral, BID  apixaban (ELIQUIS) tablet 5 mg, 5 mg, Oral, BID  febuxostat (ULORIC) tablet 40 mg, 40 mg, Oral, Daily  gabapentin (NEURONTIN) capsule 300 mg, 300 mg, Oral, BID  insulin glargine (LANTUS) injection vial 73 Units, 73 Units, SubCUTAneous, BID  pantoprazole (PROTONIX) tablet 40 mg, 40 mg, Oral, QAM AC  polyethylene glycol (GLYCOLAX) packet 17 g, 17 g, Oral, Daily  ranolazine (RANEXA) extended release tablet 1,000 mg, 1,000 mg, Oral, BID  senna (SENOKOT) tablet 17.2 mg, 2 tablet, Oral, Daily PRN  tamsulosin (FLOMAX) capsule 0.4 mg, 0.4 mg, Oral, Daily  traZODone (DESYREL) tablet 75 mg, 75 mg, Oral, Nightly  glucose (GLUTOSE) 40 % oral gel 15 g, 15 g, Oral, PRN  dextrose 50 % IV solution, 12.5 g, IntraVENous, PRN  glucagon (rDNA) injection 1 mg, 1 mg, IntraMUSCular, PRN  dextrose 5 % solution, 100 mL/hr, IntraVENous, PRN  sodium chloride flush 0.9 % injection 5-40 mL, 5-40 mL, IntraVENous, 2 times per day  sodium chloride flush 0.9 % injection 5-40 mL, 5-40 mL, IntraVENous, PRN  0.9 % sodium chloride infusion, 25 mL, IntraVENous, PRN  ondansetron (ZOFRAN-ODT) disintegrating tablet 4 mg, 4 mg, Oral, Q8H PRN **OR** ondansetron (ZOFRAN) injection 4 mg, 4 mg, IntraVENous, Q6H PRN  polyethylene glycol (GLYCOLAX) packet 17 g, 17 g, Oral, Daily PRN  acetaminophen (TYLENOL) tablet 650 mg, 650 mg, Oral, Q6H PRN **OR** acetaminophen (TYLENOL) suppository 650 mg, 650 mg, Rectal, Q6H PRN  insulin lispro (HUMALOG) injection vial 0-18 Units, 0-18 Units, SubCUTAneous, TID WC  insulin lispro (HUMALOG) injection vial 0-9 Units, 0-9 Units, SubCUTAneous, Nightly  HYDROcodone-acetaminophen (NORCO) 5-325 MG per tablet 1 tablet, 1 tablet, Oral, Q6H PRN         Past Medical History:    Past Medical History             Diagnosis Date    Backache, unspecified      CHF (congestive heart failure) (HCC)      Chronic kidney disease      Coronary atherosclerosis of artery bypass graft      Cramp of limb      Gallstones      Hypertension      Insomnia      Pneumonia      Type II or unspecified type diabetes mellitus with renal manifestations, not stated as uncontrolled(250.40)      Type II or unspecified type diabetes mellitus without mention of complication, not stated as uncontrolled      Unspecified vitamin D deficiency              Past Surgical History:    Past Surgical History             Procedure Laterality Date    ANKLE FRACTURE SURGERY        AV FISTULA CREATION   12/14/2021    BREAST SURGERY        CARPAL TUNNEL RELEASE        CHOLECYSTECTOMY, OPEN N/A      CORONARY ARTERY BYPASS GRAFT         x3    DIALYSIS FISTULA CREATION Left 12/14/2021     LEFT AV FISTULA CREATION UPPER EXTREMITY performed by Vicki Vinson MD at [address] IR TUNNELED [address] 5 YEARS   8/18/2021     IR TUNNELED CATHETER PLACEMENT GREATER THAN 5 YEARS 8/18/2021 [facility] PROCEDURES    KNEE ARTHROSCOPY         right    TONSILLECTOMY                Allergies: Adhesive tape, Ace inhibitors, Iv dye [iodides], and Metformin and related              Social History:       Born in: [place], [place]. Family: Reports she grew up with her mother and father. Reports she has 2 sisters. Highest Level of Education: Graduated high school. Occupation: Reports she is retired and on disability. Marital Status: Single, never . Children: No children. Residence: Reports she resides in a [facility] with her mother and father. Stressors: Hospitalization. Patient Assets/Supportive Factors: 2 sisters.            SUBSTANCE USE HISTORY:Patient denies the use of cigarettes or electronic cigarettes. She denies use of cannabis. She reports she used to drink alcohol socially in the past, but reports she does not drink any alcohol anymore.   She denies use of any other drugs or substances at the time of assessment.            LEGAL HISTORY:   HISTORY OF INCARCERATION: []? Yes [x]? No            Family Medical and Psychiatric History:      Patient endorses psychiatric family history. Mother: Anxiety.     Suicides in family: []? Yes [x]? No     Substance use in family: []? Yes [x]? No     Family History             Problem Relation Age of Onset    Diabetes Father      Heart Failure Father                     Physical  BP (!) 133/54   Pulse 66   Temp 97.5 °F (36.4 °C) (Oral)   Resp 16   Ht 5' 5\" (1.651 m)   Wt 262 lb 9.1 oz (119.1 kg)   SpO2 94%   BMI 43.69 kg/m²      LABS:  Labs reviewed: [x]? Yes  Last EKG in EMR reviewed: [x]? Yes  QTc: 470.            Mental Status Examination:       Level of consciousness: Awake and alert  Appearance: Hospital attire attire, seated on bed, good grooming   Behavior/Motor: Approachable, no psychomotor abnormalities noted  Attitude toward examiner:  Cooperative, pleasant, attentive, good eye contact. Speech: Normal rate, volume, and tone. Mood: \"Disappointed\". Affect:  Euthymic. Thought processes: Linear, goal directed and coherent  Thought content: Denies suicidal ideations, without current plan or intent, contracts for safety on the unit.               Denies homicidal ideations               When asked about visual hallucinations, patient reports she was sleeping and stated \"I thought there was a mirror\" and reports she was waving at a friend and reports she woke up to herself waving. Patient was unable to identify if it was a dream. When writer further elaborated for assessment questions pertaining to visual hallucinations, patient denied any symptoms of visual hallucinations.                Denies auditory hallucinations.              Denies delusions              Denies paranoia  Cognition:  Oriented to self, location, time, situation  Concentration: Clinically adequate  Memory: Intact  Insight &Judgment: Fair            DSM-5 DIAGNOSIS:       Generalized anxiety disorder.     Rule Out: Major depressive disorder.      Stressors     Severity of stressors is moderate. Source of stressors include: Hospitalization.     PLAN:    Patient does not meet requirements for inpatient psychiatric hospital admission. Patient has plans to follow-up with psychiatric services outpatient. Patient is able to contract for her safety in the hospital. Recommend continuation of current psychiatric medications. Recommend following up with psychiatric services outpatient.     Thank you very much for allowing us to participate in the care of this patient.     Electronically signed by Kaela Torres MD on 1/3/22 at 9:05 PM EST

## 2022-01-04 NOTE — PROGRESS NOTES
FAMILY MEDICINE  - PROGRESS NOTE    Date:  1/4/2022  Pranav Salmeron  [id number]      Chief Complaint   Patient presents with    Back Pain         Interval History:  not changed much, she has no new complaints this am. Her pain has resolved. No significant events overnight. Specialists notes, labs & imaging reviewed. Psychiatry was consulted did not change her current medications.     Subjective  Constitutional: positive for fatigue and obesity  Musculoskeletal:positive for muscle weakness and myalgias  Neurological: positive for memory problems and tremors  Endocrine: positive for diabetic symptoms including neuropathy and hyperglycemia:    Objective:    BP (!) 100/38   Pulse 62   Temp 98 °F (36.7 °C)   Resp 16   Ht 5' 5\" (1.651 m)   Wt 262 lb 9.1 oz (119.1 kg)   SpO2 95%   BMI 43.69 kg/m²   General appearance - overweight  Mental status - drowsy  Eyes - not examined  Ears - bilateral TM's and external ear canals normal  Nose - normal and patent, no erythema, discharge or polyps  Mouth - mucous membranes moist, pharynx normal without lesions  Neck - supple, no significant adenopathy  Lymphatics - no palpable lymphadenopathy, no hepatosplenomegaly  Chest - clear to auscultation, no wheezes, rales or rhonchi, symmetric air entry  Heart - normal rate, regular rhythm, normal S1, S2, no murmurs, rubs, clicks or gallops  Abdomen - soft, nontender, nondistended, no masses or organomegaly  Breasts - not examined  Back exam - not examined  Neurological - neck supple without rigidity  Musculoskeletal - no joint tenderness, deformity or swelling  Extremities - peripheral pulses normal, no pedal edema, no clubbing or cyanosis  Skin - normal coloration and turgor, no rashes, no suspicious skin lesions noted    Data:   Medications:   Current Facility-Administered Medications   Medication Dose Route Frequency Provider Last Rate Last Admin    epoetin raphael-epbx (RETACRIT) injection 3,000 Units  3,000 Units SubCUTAneous Once per day on Mon Wed Fri Conner Mcqueen MD   3,000 Units at 01/03/22 2318    sodium bicarbonate tablet 650 mg  650 mg Oral BID Conner Mcqueen MD   650 mg at 01/03/22 2114    carvedilol (COREG) tablet 3.125 mg  3.125 mg Oral BID WC Conner Mcqueen MD   3.125 mg at 01/03/22 1705    anticoagulant sodium citrate 4 % injection 1.9 mL  1.9 mL IntraCATHeter PRN Conner Mcqueen MD        anticoagulant sodium citrate 4 % injection 1.9 mL  1.9 mL IntraCATHeter PRN Conner Mcqueen MD   1.9 mL at 01/03/22 1201    tiZANidine (ZANAFLEX) tablet 4 mg  4 mg Oral Q6H PRN Hemal Peck MD   4 mg at 12/30/21 0123    tiZANidine (ZANAFLEX) tablet 2 mg  2 mg Oral TID Hemal Peck MD   2 mg at 01/03/22 2114    lidocaine 4 % external patch 1 patch  1 patch TransDERmal Daily Valeri Guzman DO   1 patch at 01/03/22 3104    aspirin chewable tablet 81 mg  81 mg Oral Daily Jose Silverman MD   81 mg at 01/03/22 0734    atorvastatin (LIPITOR) tablet 80 mg  80 mg Oral Daily Jose Silverman MD   80 mg at 01/03/22 0735    busPIRone (BUSPAR) tablet 7.5 mg  7.5 mg Oral TID Jose Silverman MD   7.5 mg at 01/03/22 2054    docusate sodium (COLACE) capsule 100 mg  100 mg Oral BID Jose Silverman MD   100 mg at 01/03/22 2055    apixaban (ELIQUIS) tablet 5 mg  5 mg Oral BID Jose Silverman MD   5 mg at 01/03/22 2055    febuxostat (ULORIC) tablet 40 mg  40 mg Oral Daily Jose Silverman MD   40 mg at 01/03/22 0740    gabapentin (NEURONTIN) capsule 300 mg  300 mg Oral BID Jose Silverman MD   300 mg at 01/03/22 2055    insulin glargine (LANTUS) injection vial 73 Units  73 Units SubCUTAneous BID Jose Silverman MD   73 Units at 01/03/22 2140    pantoprazole (PROTONIX) tablet 40 mg  40 mg Oral QAM AC Jose Law, MD   40 mg at 01/03/22 0639    polyethylene glycol (GLYCOLAX) packet 17 g  17 g Oral Daily Ruth Aguirre MD   17 g at 01/03/22 2078    ranolazine (RANEXA) extended release tablet 1,000 mg  1,000 mg Oral BID Ruth Aguirre MD   1,000 mg at 01/03/22 2054    senna (SENOKOT) tablet 17.2 mg  2 tablet Oral Daily PRN Ruth Aguirre MD        [facility]) capsule 0.4 mg  0.4 mg Oral Daily Ruth Aguirre MD   0.4 mg at 01/03/22 0735    traZODone (DESYREL) tablet 75 mg  75 mg Oral Nightly Ruth Aguirre MD   75 mg at 01/03/22 2318    glucose (GLUTOSE) 40 % oral gel 15 g  15 g Oral PRN Ruth Aguirre MD        dextrose 50 % IV solution  12.5 g IntraVENous PRN Ruth Aguirre MD        glucagon (rDNA) injection 1 mg  1 mg IntraMUSCular PRN Ruth Aguirre MD        dextrose 5 % solution  100 mL/hr IntraVENous PRN Ruth Aguirre MD        sodium chloride flush 0.9 % injection 5-40 mL  5-40 mL IntraVENous 2 times per day Ruth Aguirre MD   10 mL at 01/01/22 2218    sodium chloride flush 0.9 % injection 5-40 mL  5-40 mL IntraVENous PRN Ruth Aguirre MD        0.9 % sodium chloride infusion  25 mL IntraVENous PRN Ruth Aguirre MD        ondansetron (ZOFRAN-ODT) disintegrating tablet 4 mg  4 mg Oral Q8H PRN Ruth Agiurre MD        Or    ondansetron [facility]) injection 4 mg  4 mg IntraVENous Q6H PRN Ruth Aguirre MD        polyethylene glycol [facility]) packet 17 g  17 g Oral Daily PRN Ruth Aguirre MD        acetaminophen (TYLENOL) tablet 650 mg  650 mg Oral Q6H PRN Ruth Aguirre MD   650 mg at 01/03/22 2114    Or    acetaminophen (TYLENOL) suppository 650 mg  650 mg Rectal Q6H PRN Ruth Aguirre MD        insulin lispro (HUMALOG) injection vial 0-18 Units  0-18 Units SubCUTAneous TID WC Ruth Aguirre MD   6 Units at 01/03/22 1705    insulin lispro (HUMALOG) injection vial 0-9 Units  0-9 Units SubCUTAneous Nightly Ruth Aguirre MD   3 Units at 01/03/22 4193    HYDROcodone-acetaminophen (NORCO) 5-325 MG per tablet 1 tablet  1 tablet Oral Q6H PRN Elan Campbell MD   1 tablet at 01/03/22 2318       Intake/Output Summary (Last 24 hours) at 1/4/2022 0640  Last data filed at 1/3/2022 1238  Gross per 24 hour   Intake 740 ml   Output 2500 ml   Net -1760 ml     Recent Results (from the past 24 hour(s))   POC Glucose Fingerstick    Collection Time: 01/03/22  6:45 AM   Result Value Ref Range    POC Glucose 144 (H) 65 - 105 mg/dL   POC Glucose Fingerstick    Collection Time: 01/03/22 12:15 PM   Result Value Ref Range    POC Glucose 126 (H) 65 - 105 mg/dL   POC Glucose Fingerstick    Collection Time: 01/03/22  4:59 PM   Result Value Ref Range    POC Glucose 234 (H) 65 - 105 mg/dL     -----------------------------------------------------------------  RAD:  EKG:  Micro:     Assessment & Plan:    Patient Active Problem List:     Atherosclerosis of coronary artery bypass graft of native heart without angina pectoris     Acute renal failure ([facility]     Diabetes mellitus due to underlying condition with diabetic nephropathy, with long-term current use of insulin ([facility])     Chronic diastolic heart failure ([facility])     Diabetic polyneuropathy associated with type 2 diabetes mellitus ([facility]     History of coronary artery bypass graft     Iron deficiency anemia     Spinal stenosis of lumbar region with neurogenic claudication     Mixed hyperlipidemia     Stage 4 chronic kidney disease ([facility]     Type 2 diabetes mellitus with kidney complication, with long-term current use of insulin ([facility])     Syncope and collapse     Obesity, Class II, BMI 35-39.9     Thyroid nodule greater than or equal to 1 cm in diameter incidentally noted on imaging study     Essential hypertension     Anemia in stage 4 chronic kidney disease (HCC)     Chronic ischemic heart disease     Ischemic stroke of frontal lobe ([facility])     Stroke-like symptoms     Morbid obesity with BMI of 40.0-44.9, adult ([facility]     Acute encephalopathy     Disequilibrium syndrome     Generalized weakness     Long-term memory loss     Muscle right arm weakness     Anxiety     Chronic midline low back pain with bilateral sciatica     Need for immunization against influenza     Altered mental status     Acute right-sided thoracic back pain     Tremor     Metabolic encephalopathy           Plan:  -Metabolic encephalopathy - mental status back at baseline. -ESRD on dialysis - Nephrology managing. -DM2 - stable.  -Emotional lability - Psychiatry input appreciated. -D/C planning ongoing - ARU vs SNF.  -Continue current treatments.  -Complete orders per chart.     See orders   Disposition:    Electronically signed by Jeanette Trevizo MD on 1/4/2022 at 6:40 AM

## 2022-01-04 NOTE — PLAN OF CARE
Problem: Falls - Risk of:  Goal: Will remain free from falls  Description: Will remain free from falls  1/4/2022 1738 by Cookie Douglas RN  Outcome: Ongoing  Note: Pt remained free of falls during shift. 1/4/2022 0602 by Sarah Conway RN  Note: Patient makes no attempts to get out of bed per self, alert and oriented and calls for assistance   1/4/2022 0602 by Sarah Conway RN  Outcome: Ongoing  Goal: Absence of physical injury  Description: Absence of physical injury  1/4/2022 1738 by Cookie Douglas RN  Outcome: Ongoing  1/4/2022 0602 by Sarah Conway RN  Outcome: Ongoing     Problem: Pain:  Goal: Pain level will decrease  Description: Pain level will decrease  1/4/2022 1738 by Cookie Douglas RN  Outcome: Ongoing  1/4/2022 0602 by Sarah Conway RN  Outcome: Ongoing  Note: Medicated for chronic pain in feet.  Medication effective  Goal: Control of acute pain  Description: Control of acute pain  1/4/2022 1738 by Cookie Douglas RN  Outcome: Ongoing  1/4/2022 0602 by Sarah Conway RN  Outcome: Ongoing     Problem: Skin Integrity:  Goal: Will show no infection signs and symptoms  Description: Will show no infection signs and symptoms  1/4/2022 1738 by Cookie Douglas RN  Outcome: Ongoing  1/4/2022 0602 by Sarah Conway RN  Outcome: Ongoing  Goal: Absence of new skin breakdown  Description: Absence of new skin breakdown  1/4/2022 1738 by Cookie Douglas RN  Outcome: Ongoing  1/4/2022 0602 by Sarah Conway RN  Outcome: Ongoing

## 2022-01-04 NOTE — PROGRESS NOTES
Attempted to wean patient off oxygen per nasal cannula. Patient 83% on room air.   Nasal cannula placed at 2L, sp02 95%

## 2022-01-04 NOTE — PROGRESS NOTES
on room air. Patient put on 1L of O2 and SpO2 was 92%  General Comment  Comments: DEDE Huynh  approved therapy   Pain Screening  Patient Currently in Pain: Denies  Vital Signs  Pulse: 70  Heart Rate Source: Monitor  Patient Currently in Pain: Denies  Oxygen Therapy  SpO2: 92 %  Pulse Oximeter Device Mode: Intermittent  Pulse Oximeter Device Location: Finger  O2 Device: Nasal cannula  O2 Flow Rate (L/min): 1 L/min       Orientation  Orientation  Overall Orientation Status: Within Functional Limits  Objective      Transfers  Sit to Stand: Contact guard assistance  Stand to sit: Contact guard assistance  Bed to Chair: Contact guard assistance  Stand Pivot Transfers: Contact guard assistance  Comment: standing with RW  Ambulation  Ambulation?: Yes  Ambulation 1  Surface: level tile  Device: Rolling Walker  Other Apparatus: O2 (1L)  Assistance: Contact guard assistance  Quality of Gait: Steady, no LOB. No buckling of knees this date. Pt fatigues quickly  Gait Deviations: Increased ELISABET; Decreased step length;Decreased step height  Distance: 91ft x2  Comments: seated rest break in between distances. SpO2 92% HR 69 post ambulation   Stairs/Curb  Stairs?: No        Other exercises  Other exercises?: Yes  Other exercises 1: Seated BLE exs x 20 reps with green tband   Other exercises 2: STS x 3  Other exercises 3: donned tennis shoes      Goals  Short term goals  Time Frame for Short term goals: 2-3 days  Short term goal 1: mod-I bed mobility  Short term goal 2: mod-I transfers  Short term goal 3: mod-I gait with RW x 50-100ft    Plan    Plan  Times per week: 5-6x/wk  Current Treatment Recommendations: Strengthening,Balance Training,Functional Mobility Training,Transfer Training,Gait Training  Safety Devices  Type of devices:  All fall risk precautions in place,Call light within reach,Gait belt,Nurse notified,Left in chair        01/04/22 1448   PT Individual Minutes   Time In 1357   Time Out 1438   Minutes 41     Electronically signed by Nakul Martínez, PTA on 1/4/22 at 2:54 PM EST

## 2022-01-04 NOTE — PROGRESS NOTES
Writer attempted to wean pt of oxygen. Pt on 2L NC @94%. Oxygen then decreased to 1L NC and sat 94% after 30 minutes. Oxygen then turned off and recheck at 1400 was 87% on room air.  Oxygen restarted at 1L NC.

## 2022-01-04 NOTE — PROGRESS NOTES
Department of Internal Medicine  Nephrology Carlito Hinkle MD   Progress Note    Reason for consultation: Management of hemodialysis dependent end-stage renal disease. Consulting physician: Walt Brink MD    Interval history:   Patient was seen and examined today,Tolerated dialysis yesterday  with 2.5 kg removed. Patient complains of Sore throat and difficulty swallowing. .Blood pressure is stable and she denies dyspnea at rest.    History of present illness: This is a 59 y.o. female with a significant past medical history of Chronic atrial fibrillation [on Eliquis], Type 2 diabetes mellitus, essential systemic hypertension, coronary artery disease [s/p CABG in 2004 and PTCA in 2019], heart failure with preserved ejection fraction [LVEF 55%] and end-stage renal disease secondary to diabetic and hypertensive nephropathy [on routine hemodialysis on a Monday/Wednesday/Friday schedule at [address] [place] [address] dialysis unit on [facility] under the care of Dr. Ted Rivera since August 2001 using IJ tunneled dialysis catheter], who presented to the hospital via EMS for evaluation of worsening low back pain rated 10/10. She was seen and examined this morning and she remains quite lethargic and stuporous and difficult to arouse. She however is not in acute respiratory distress.     Scheduled Meds:   epoetin raphael-epbx  3,000 Units SubCUTAneous Once per day on Mon Wed Fri    sodium bicarbonate  650 mg Oral BID    carvedilol  3.125 mg Oral BID WC    tiZANidine  2 mg Oral TID    lidocaine  1 patch TransDERmal Daily    aspirin  81 mg Oral Daily    atorvastatin  80 mg Oral Daily    busPIRone  7.5 mg Oral TID    docusate sodium  100 mg Oral BID    apixaban  5 mg Oral BID    febuxostat  40 mg Oral Daily    gabapentin  300 mg Oral BID    insulin glargine  73 Units SubCUTAneous BID    pantoprazole  40 mg Oral QAM AC    polyethylene glycol  17 g Oral Daily    ranolazine  1,000 mg Oral BID    tamsulosin  0.4 mg Oral Daily    traZODone  75 mg Oral Nightly    sodium chloride flush  5-40 mL IntraVENous 2 times per day    insulin lispro  0-18 Units SubCUTAneous TID WC    insulin lispro  0-9 Units SubCUTAneous Nightly     Continuous Infusions:   dextrose      sodium chloride       PRN Meds:.anticoagulant sodium citrate, anticoagulant sodium citrate, tiZANidine, senna, glucose, dextrose, glucagon (rDNA), dextrose, sodium chloride flush, sodium chloride, ondansetron **OR** ondansetron, polyethylene glycol, acetaminophen **OR** acetaminophen, HYDROcodone 5 mg - acetaminophen    Physical Exam:    VITALS:  BP (!) 130/54   Pulse 70   Temp 98.1 °F (36.7 °C)   Resp 17   Ht 5' 5\" (1.651 m)   Wt 262 lb 9.1 oz (119.1 kg)   SpO2 96%   BMI 43.69 kg/m²   24HR INTAKE/OUTPUT:    No intake or output data in the 24 hours ending 01/04/22 1435    Constitutional: slowed mentation and Lethargic and difficult to arouse    Skin: Skin color, texture, turgor normal. No rashes or lesions    Head: Normocephalic, without obvious abnormality, atraumatic     Cardiovascular/Edema: regular rate and rhythm, S1, S2 normal, no murmur, click, rub or gallop    Respiratory: Lungs: clear to auscultation bilaterally    Abdomen: soft, non-tender; bowel sounds normal; no masses,  no organomegaly    Back: symmetric, no curvature. ROM normal. No CVA tenderness. Extremities: edema +    Neuro:  Stuporous and lethargic. CBC:   No results for input(s): WBC, HGB, PLT in the last 72 hours.   BMP:    Recent Labs     01/02/22  0700 01/03/22  0631   * 132*   K 4.3 4.7   CL 93* 93*   CO2 22 25   BUN 33* 40*   CREATININE 4.41* 4.89*   GLUCOSE 172* 158*     Lab Results   Component Value Date    NITRU NEGATIVE 11/14/2021    COLORU Yellow 11/14/2021    PHUR 5.0 11/14/2021    WBCUA 5 TO 10 11/14/2021    RBCUA 0 TO 2 11/14/2021    MUCUS NOT REPORTED 11/14/2021    TRICHOMONAS NOT REPORTED 11/14/2021    YEAST FEW 11/14/2021    BACTERIA MANY 11/14/2021    SPECGRAV 1.014 11/14/2021    LEUKOCYTESUR TRACE 11/14/2021    UROBILINOGEN Normal 11/14/2021    BILIRUBINUR NEGATIVE 11/14/2021    GLUCOSEU 3+ 11/14/2021    KETUA NEGATIVE 11/14/2021    AMORPHOUS NOT REPORTED 11/14/2021     Urine Sodium:     Lab Results   Component Value Date    JASON 47 11/14/2021     Urine Chloride:    Lab Results   Component Value Date    CLUR 26 11/14/2021     Urine Protein:     Lab Results   Component Value Date    TPU 20 11/12/2021     Urine Creatinine:     Lab Results   Component Value Date    LABCREA 72.0 11/14/2021     IMPRESSION/RECOMMENDATIONS:      1.  End-stage renal disease - we will maintain MWF hemodialysis schedule. Vascular access is IJ tunneled hemodialysis catheter. Renal diet,i.e 2-gram sodium,2-gram potassium,1500 ml fluid restriction,1-gram phosphorus, 1800 KCal and 1.2 gram protein per day. 2.  Altered mental status -improved   Mental status is back to baseline. 3.   Normocytic anemia of chronic kidney disease -Retacrit 3 times weekly    4. Systemic hypertension - blood pressure control is adequate. Plan  Continue hemodialysis Monday Wednesday Friday  Check basic metabolic panel and CBC on dialysis days  Next dialysis Wednesday      Prognosis is guarded.     Kenan Denny MD   Attending Nephrologist  1/4/2022 2:35 PM

## 2022-01-04 NOTE — PROGRESS NOTES
[address] [facility]    INPATIENT OCCUPATIONAL THERAPY  PROGRESS NOTE  Date: 2022  Patient Name: Montserrat Wheatley      Room: 5678/2123-40  MRN: [id number]    : 1960  (62 y.o.) Gender: female     Discharge Recommendations:  Further Occupational Therapy is recommended upon facility discharge. Equipment Needed: No    Referring Practitioner: Nilam Aguilar MD  Diagnosis: Acute right-sided thoracic back pain  General  Chart Reviewed: Yes  Patient assessed for rehabilitation services?: Yes  Additional Pertinent Hx: 59 y.o. female with past medical history significant for end-stage renal disease on dialysis, CAD, diabetes mellitus via EMS with reports of back pain. Patient is alert although does not seem to be answering questions appropriately, history is unreliable with multiple stories being reported by patient. Patient reports back pain right-sided with no injury initially and then reports that she was picking something up and did have pain of the right side which happened a week ago. Patient does state that she has had prior incidence of back spasms and back pain. Response to previous treatment: Patient with no complaints from previous session  Family / Caregiver Present: No  Referring Practitioner: Nilam Aguilar MD  Diagnosis: Acute right-sided thoracic back pain    Restrictions  Restrictions/Precautions: General Precautions,Fall Risk  Required Braces or Orthoses?: No      Subjective  Subjective: \"I feel good today\". Pt is pleasant and motivated for therapy today. Comments: DEDE hay's tx.    Patient Currently in Pain: Yes  Pain Level: 2  Pain Location: Shoulder  Pain Orientation: Right  Overall Orientation Status: Within Functional Limits     Pain Assessment  Pain Assessment: 0-10  Pain Level: 2  Pain Type: Acute pain  Pain Location: Shoulder  Pain Orientation: Right    Objective  Cognition  Overall Cognitive Status: WFL     Balance  Sitting Balance: Stand by assistance (sitting in recliner)         ADL  Feeding: Setup; Adaptive utensils (comment)  Grooming: Setup  UE Bathing: Minimal assistance  LE Bathing: Moderate assistance  UE Dressing: Minimal assistance  LE Dressing: Moderate assistance  Toileting: Moderate assistance  Additional Comments: ADL scores based on clinical reasoning and skilled observation unless otherwise noted. Pt currrently limited due to decreased strength, balance, activity, and increased pain impacting safety and independence with self care tasks. Pt reports she showered this morning with assist from nursing aides. Type of ROM/Therapeutic Exercise  Type of ROM/Therapeutic Exercise: Free weights  Comment: Pt engaged in BUE ex's while in bed to promote increased strengthening and endurance. Pt completes each exercise to tolerance; x20 reps, 1 weight. Exercises  Scapular Protraction: x  Scapular Retraction: x  Shoulder Flexion: x  Shoulder Extension: x  Shoulder ABduction: x  Shoulder ADduction: x  Horizontal ABduction: LUE only due to R shoulder pain   Horizontal ADduction: x  Elbow Flexion: x  Elbow Extension: x  Supination: x  Pronation: x  Wrist Flexion: x  Wrist Extension: x  Grasp/Release: 2nd setting spring hand gripper x15 reps B hands. Other: graded resisitve clothes pins for increaesd hand strength. Pt completes with good tolerance requiring b hands to place black clips. Assessment  Performance deficits / Impairments: Decreased functional mobility ; Decreased ADL status; Decreased strength;Decreased safe awareness;Decreased endurance;Decreased sensation;Decreased balance  Prognosis: Good  Discharge Recommendations: Continue to assess pending progress  Activity Tolerance: Patient Tolerated treatment well  Safety Devices in place: Yes  Type of devices: Call light within reach; All fall risk precautions in place;Nurse notified; Left in chair  Equipment Recommendations  Equipment Needed: No          Patient Education: OT POC, safety and requiring assist for functional transfers and mobility, effective techniques and modifications for UE exercises.       Learner:patient  Method: explanation       Outcome: acknowledged understanding of      Plan  Safety Devices  Safety Devices in place: Yes  Type of devices: Call light within reach,All fall risk precautions in place,Nurse notified,Left in chair  Plan  Times per week: 4-5  Times per day: Daily  Current Treatment Recommendations: Strengthening,ROM,Balance Training,Functional Mobility Jose Alfredo Ren Education & Training,Patient/Caregiver Education & Training,Equipment Evaluation, Education, & procurement,Self-Care / ADL      Goals  Short term goals  Time Frame for Short term goals: By discharge  Short term goal 1: Patient will perform BADLs with mod I and good safety  Short term goal 2: Patient will tolerate standing 4+ minutes, with no LOB, mod I and good safety  Short term goal 3: Patient will perform transfers/functional mobility with mod I   Short term goal 4: Patient will V/D at least 3 EC/WS/fall prevention strategies to promote safety with daily routine  Short term goal 5: Patient will actively participate in 15+ minutes of therapeutic activity to promote independence with self care and mobility    OT Individual Minutes  Time In: 0956  Time Out: 1021  Minutes: 25      Electronically signed by DAYANA Ibanez on 1/4/22 at 12:55 PM EST

## 2022-01-05 LAB
ANION GAP SERPL CALCULATED.3IONS-SCNC: 15 MMOL/L (ref 9–17)
BUN BLDV-MCNC: 41 MG/DL (ref 8–23)
BUN/CREAT BLD: ABNORMAL (ref 9–20)
CALCIUM SERPL-MCNC: 8.9 MG/DL (ref 8.6–10.4)
CHLORIDE BLD-SCNC: 94 MMOL/L (ref 98–107)
CO2: 23 MMOL/L (ref 20–31)
CREAT SERPL-MCNC: 4.09 MG/DL (ref 0.5–0.9)
GFR AFRICAN AMERICAN: 13 ML/MIN
GFR NON-AFRICAN AMERICAN: 11 ML/MIN
GFR SERPL CREATININE-BSD FRML MDRD: ABNORMAL ML/MIN/{1.73_M2}
GFR SERPL CREATININE-BSD FRML MDRD: ABNORMAL ML/MIN/{1.73_M2}
GLUCOSE BLD-MCNC: 154 MG/DL (ref 70–99)
GLUCOSE BLD-MCNC: 183 MG/DL (ref 65–105)
GLUCOSE BLD-MCNC: 216 MG/DL (ref 65–105)
GLUCOSE BLD-MCNC: 50 MG/DL (ref 65–105)
HCT VFR BLD CALC: 26.7 % (ref 36–46)
HEMOGLOBIN: 9.4 G/DL (ref 12–16)
MCH RBC QN AUTO: 33.9 PG (ref 26–34)
MCHC RBC AUTO-ENTMCNC: 35.4 G/DL (ref 31–37)
MCV RBC AUTO: 95.8 FL (ref 80–100)
NRBC AUTOMATED: ABNORMAL PER 100 WBC
PDW BLD-RTO: 14.5 % (ref 11.5–14.9)
PLATELET # BLD: 213 K/UL (ref 150–450)
PMV BLD AUTO: 8 FL (ref 6–12)
POTASSIUM SERPL-SCNC: 5.2 MMOL/L (ref 3.7–5.3)
RBC # BLD: 2.79 M/UL (ref 4–5.2)
SODIUM BLD-SCNC: 132 MMOL/L (ref 135–144)
WBC # BLD: 5.7 K/UL (ref 3.5–11)

## 2022-01-05 PROCEDURE — 99232 SBSQ HOSP IP/OBS MODERATE 35: CPT | Performed by: PHYSICAL MEDICINE & REHABILITATION

## 2022-01-05 PROCEDURE — 97116 GAIT TRAINING THERAPY: CPT

## 2022-01-05 PROCEDURE — 99226 PR SBSQ OBSERVATION CARE/DAY 35 MINUTES: CPT | Performed by: PSYCHIATRY & NEUROLOGY

## 2022-01-05 PROCEDURE — G0378 HOSPITAL OBSERVATION PER HR: HCPCS

## 2022-01-05 PROCEDURE — 90935 HEMODIALYSIS ONE EVALUATION: CPT

## 2022-01-05 PROCEDURE — 99225 PR SBSQ OBSERVATION CARE/DAY 25 MINUTES: CPT | Performed by: FAMILY MEDICINE

## 2022-01-05 PROCEDURE — 6370000000 HC RX 637 (ALT 250 FOR IP): Performed by: PSYCHIATRY & NEUROLOGY

## 2022-01-05 PROCEDURE — 85027 COMPLETE CBC AUTOMATED: CPT

## 2022-01-05 PROCEDURE — 97530 THERAPEUTIC ACTIVITIES: CPT

## 2022-01-05 PROCEDURE — 97110 THERAPEUTIC EXERCISES: CPT

## 2022-01-05 PROCEDURE — 6370000000 HC RX 637 (ALT 250 FOR IP): Performed by: STUDENT IN AN ORGANIZED HEALTH CARE EDUCATION/TRAINING PROGRAM

## 2022-01-05 PROCEDURE — 80048 BASIC METABOLIC PNL TOTAL CA: CPT

## 2022-01-05 PROCEDURE — 6370000000 HC RX 637 (ALT 250 FOR IP): Performed by: INTERNAL MEDICINE

## 2022-01-05 PROCEDURE — 6370000000 HC RX 637 (ALT 250 FOR IP): Performed by: FAMILY MEDICINE

## 2022-01-05 PROCEDURE — 36415 COLL VENOUS BLD VENIPUNCTURE: CPT

## 2022-01-05 RX ORDER — GABAPENTIN 400 MG/1
400 CAPSULE ORAL NIGHTLY
Status: DISCONTINUED | OUTPATIENT
Start: 2022-01-05 | End: 2022-01-13 | Stop reason: HOSPADM

## 2022-01-05 RX ADMIN — HYDROCODONE BITARTRATE AND ACETAMINOPHEN 1 TABLET: 5; 325 TABLET ORAL at 16:46

## 2022-01-05 RX ADMIN — TRAZODONE HYDROCHLORIDE 75 MG: 50 TABLET ORAL at 20:38

## 2022-01-05 RX ADMIN — FEBUXOSTAT 40 MG: 40 TABLET, FILM COATED ORAL at 16:39

## 2022-01-05 RX ADMIN — DOCUSATE SODIUM 100 MG: 100 CAPSULE ORAL at 20:36

## 2022-01-05 RX ADMIN — CARVEDILOL 3.12 MG: 3.12 TABLET, FILM COATED ORAL at 16:39

## 2022-01-05 RX ADMIN — TIZANIDINE 2 MG: 2 TABLET ORAL at 20:42

## 2022-01-05 RX ADMIN — ATORVASTATIN CALCIUM 80 MG: 80 TABLET, FILM COATED ORAL at 16:39

## 2022-01-05 RX ADMIN — TAMSULOSIN HYDROCHLORIDE 0.4 MG: 0.4 CAPSULE ORAL at 16:39

## 2022-01-05 RX ADMIN — POLYETHYLENE GLYCOL 3350 17 G: 17 POWDER, FOR SOLUTION ORAL at 16:45

## 2022-01-05 RX ADMIN — SODIUM BICARBONATE 650 MG: 650 TABLET ORAL at 20:38

## 2022-01-05 RX ADMIN — INSULIN GLARGINE 73 UNITS: 100 INJECTION, SOLUTION SUBCUTANEOUS at 20:24

## 2022-01-05 RX ADMIN — INSULIN LISPRO 3 UNITS: 100 INJECTION, SOLUTION INTRAVENOUS; SUBCUTANEOUS at 16:40

## 2022-01-05 RX ADMIN — ASPIRIN 81 MG: 81 TABLET, CHEWABLE ORAL at 16:39

## 2022-01-05 RX ADMIN — INSULIN LISPRO 3 UNITS: 100 INJECTION, SOLUTION INTRAVENOUS; SUBCUTANEOUS at 20:26

## 2022-01-05 RX ADMIN — RANOLAZINE 1000 MG: 500 TABLET, FILM COATED, EXTENDED RELEASE ORAL at 20:38

## 2022-01-05 RX ADMIN — HYDROCODONE BITARTRATE AND ACETAMINOPHEN 1 TABLET: 5; 325 TABLET ORAL at 23:30

## 2022-01-05 RX ADMIN — GABAPENTIN 400 MG: 400 CAPSULE ORAL at 20:40

## 2022-01-05 RX ADMIN — APIXABAN 5 MG: 5 TABLET, FILM COATED ORAL at 20:40

## 2022-01-05 RX ADMIN — BUSPIRONE HYDROCHLORIDE 7.5 MG: 5 TABLET ORAL at 20:36

## 2022-01-05 ASSESSMENT — PAIN SCALES - GENERAL
PAINLEVEL_OUTOF10: 7
PAINLEVEL_OUTOF10: 8

## 2022-01-05 NOTE — PROGRESS NOTES
[facility] Neurology   IN-PATIENT SERVICE      NEUROLOGY PROGRESS  NOTE            Date:   1/5/2022  Patient name:  Goldy Peterson  Date of admission:  12/23/2021  YOB: 1960    History of Present Illness: The patient is a 59 y.o. female who presents with Back Pain  . The patient was seen and examined and the chart was reviewed. Patient initially came in back on 12/26 for increasing lethargy, obtundation. She was noted to have pain in the mid back in addition to asterixis in the hands. Neurology was consulted at that time for work-up including MRI brain, MRI cervical and thoracic spine. There were no acute abnormalities in the imaging. Ammonia level was checked and was less than 10. Patient was started on Zanaflex at that time. Asked to reconsult on patient as she continues to have tremors in her hands. Had long discussion with patient regarding her symptoms. When speaking to the patient and she is distracted I did not see any amount of tremor in the upper or lower extremities. When asked specifically about the tremors they would inconsistently come out in more of a hand shaking type of pattern and not a consistent type of amplitude/frequency. She described them as spasms which she initially stated just started few weeks ago. Then proceeded to tell me the history of when she became dialysis dependent back in August and when she had a stroke during that time. Then stating that the spasms started prior to the stroke symptoms in August.  I then questioned the patient again regarding when they started and she states the spasms started just a few weeks ago. She seemed upset that she became dialysis dependent back in August after undergoing a CT with contrast at that time. She is currently awaiting possible rehab placement.     Past Medical History:     Past Medical History:   Diagnosis Date    Backache, unspecified     CHF (congestive heart failure) (HCC)     Chronic kidney disease  Coronary atherosclerosis of artery bypass graft     Cramp of limb     Gallstones     Hypertension     Insomnia     Pneumonia     Type II or unspecified type diabetes mellitus with renal manifestations, not stated as uncontrolled(250.40)     Type II or unspecified type diabetes mellitus without mention of complication, not stated as uncontrolled     Unspecified vitamin D deficiency         Past Surgical History:     Past Surgical History:   Procedure Laterality Date    ANKLE FRACTURE SURGERY      AV FISTULA CREATION  12/14/2021    BREAST SURGERY      CARPAL TUNNEL RELEASE      CHOLECYSTECTOMY, OPEN N/A     CORONARY ARTERY BYPASS GRAFT      x3    DIALYSIS FISTULA CREATION Left 12/14/2021    LEFT AV FISTULA CREATION UPPER EXTREMITY performed by Chandrakant Yoo MD at [address] [facility] IR TUNNELED CATHETER PLACEMENT GREATER THAN 5 YEARS  8/18/2021    IR TUNNELED CATHETER PLACEMENT GREATER THAN 5 YEARS 8/18/2021 [facility] SPECIAL PROCEDURES    KNEE ARTHROSCOPY      right    TONSILLECTOMY          Medications during admission:      epoetin raphael-epbx  3,000 Units SubCUTAneous Once per day on Mon Wed Fri    sodium bicarbonate  650 mg Oral BID    carvedilol  3.125 mg Oral BID WC    tiZANidine  2 mg Oral TID    lidocaine  1 patch TransDERmal Daily    aspirin  81 mg Oral Daily    atorvastatin  80 mg Oral Daily    busPIRone  7.5 mg Oral TID    docusate sodium  100 mg Oral BID    apixaban  5 mg Oral BID    febuxostat  40 mg Oral Daily    gabapentin  300 mg Oral BID    insulin glargine  73 Units SubCUTAneous BID    pantoprazole  40 mg Oral QAM AC    polyethylene glycol  17 g Oral Daily    ranolazine  1,000 mg Oral BID    tamsulosin  0.4 mg Oral Daily    traZODone  75 mg Oral Nightly    sodium chloride flush  5-40 mL IntraVENous 2 times per day    insulin lispro  0-18 Units SubCUTAneous TID WC    insulin lispro  0-9 Units SubCUTAneous Nightly         Physical Exam:   BP (!) 151/61   Pulse 73   Temp 98.3 °F (36.8 °C)   Resp 18   Ht 5' 5\" (1.651 m)   Wt 262 lb 2 oz (118.9 kg)   SpO2 98%   BMI 43.62 kg/m²   Temp (24hrs), Av °F (36.7 °C), Min:97.4 °F (36.3 °C), Max:98.3 °F (36.8 °C)          Neurological examination:    Mental status   Alert and oriented x 3; following all commands; speech is fluent, no dysarthria, aphasia. Cranial nerves   II - visual fields intact to confrontation; pupils reactive  III, IV, VI - extraocular muscles intact; no CECELIA; no nystagmus; no ptosis   V - normal facial sensation                                                               VII - normal facial symmetry                                                             VIII - intact hearing                                                                             IX, X - symmetrical palate elevation                                               XI - symmetrical shoulder shrug                                                       XII - midline tongue without atrophy or fasciculation     Motor function  Strength: 5/5 RUE, 4/5 RLE, 5/5 LUE, 4/5  LLE  Normal bulk and tone. No obvious tremors especially when patient is distracted. Occasional random shaking movements of the hands when asked about her symptoms. There is no consistent amplitude or frequency to these movements. Sensory function  decreased distally to proximally in lower extremities to light touch and pin     Cerebellar  when performing finger-to-nose she would briskly move her arms towards the nose and stop about correction and then move very slowly until she touched her nose stating that there are spasms in her arms. Reflex function  hyporeflexic throughout throughout . Downgoing plantar response bilaterally.  (-)Meza's sign bilaterally    Gait                   not assessed             Diagnostics:      Laboratory Testing:  CBC:   Recent Labs     22  1207   WBC 5.7   HGB 9.4*        BMP:    Recent Labs 01/03/22  0631 01/05/22  0921   * 132*   K 4.7 5.2   CL 93* 94*   CO2 25 23   BUN 40* 41*   CREATININE 4.89* 4.09*   GLUCOSE 158* 154*         Lab Results   Component Value Date    CHOL 105 04/09/2015    LDLCHOLESTEROL 72 12/26/2012    HDL 43 04/09/2015    TRIG 168 04/09/2015    ALT 13 09/17/2021    AST 16 09/17/2021    TSH 1.02 02/12/2014    INR 0.9 11/10/2021    LABA1C 7.6 (H) 12/24/2021    LABMICR 538 12/26/2012    [id number] 459 12/26/2012         Imaging/Diagnostics:        MRI brain: No acute intracranial abnormalities    MRI cervical spine: No acute fractures or abnormalities. Postop changes C5-C7 without significant spinal canal stenosis. MRI thoracic spine: No acute abnormalities of the thoracic spine. Mild degenerative changes throughout. I personally reviewed all of the above medications, clinical laboratory, imaging and other diagnostic tests. Impression:      1. Bilateral upper extremity functional tremor  2. Metabolic encephalopathy, asterixis; currently resolved  3. Diabetic peripheral neuropathy  4. ESRD on HD  5. Anxiety    Plan:      We will go ahead and decrease gabapentin dose from 300 mg twice daily to 400 mg nightly. She states her neuropathic pain in her feet is worse at night. Also with her end-stage renal disease she could have side effects including tremors and myoclonus from the gabapentin.    Maintain Zanaflex 3 times daily   Psychiatry input noted   Appears to be a good candidate for acute rehab, currently awaiting insurance authorization   No need for EEG at this time  Wesly Farias We will follow        Electronically signed by Chiara Lion DO on 1/5/2022 at 3:16 PM      [facility]  Neurology

## 2022-01-05 NOTE — PROGRESS NOTES
FAMILY MEDICINE  - PROGRESS NOTE    Date:  1/5/2022  Mac Mays  [id number]      Chief Complaint   Patient presents with    Back Pain         Interval History:  not changed much, she has no new complaints this am. Her pain has resolved. No significant events overnight. Specialists notes, labs & imaging reviewed. Psychiatry was consulted did not change her current medications.     Subjective  Constitutional: positive for fatigue and obesity  Musculoskeletal:positive for muscle weakness and myalgias  Neurological: positive for memory problems and tremors  Endocrine: positive for diabetic symptoms including neuropathy and hyperglycemia:    Objective:    /64   Pulse 60   Temp 97.4 °F (36.3 °C) (Oral)   Resp 18   Ht 5' 5\" (1.651 m)   Wt 262 lb 9.1 oz (119.1 kg)   SpO2 94%   BMI 43.69 kg/m²   General appearance - overweight  Mental status - drowsy  Eyes - not examined  Ears - bilateral TM's and external ear canals normal  Nose - normal and patent, no erythema, discharge or polyps  Mouth - mucous membranes moist, pharynx normal without lesions  Neck - supple, no significant adenopathy  Lymphatics - no palpable lymphadenopathy, no hepatosplenomegaly  Chest - clear to auscultation, no wheezes, rales or rhonchi, symmetric air entry  Heart - normal rate, regular rhythm, normal S1, S2, no murmurs, rubs, clicks or gallops  Abdomen - soft, nontender, nondistended, no masses or organomegaly  Breasts - not examined  Back exam - not examined  Neurological - neck supple without rigidity  Musculoskeletal - no joint tenderness, deformity or swelling  Extremities - peripheral pulses normal, no pedal edema, no clubbing or cyanosis  Skin - normal coloration and turgor, no rashes, no suspicious skin lesions noted    Data:   Medications:   Current Facility-Administered Medications   Medication Dose Route Frequency Provider Last Rate Last Admin    epoetin raphael-epbx (RETACRIT) injection 3,000 Units  3,000 Units SubCUTAneous Once per day on Mon Wed Fri Venu Bowden MD   3,000 Units at 01/03/22 2318    sodium bicarbonate tablet 650 mg  650 mg Oral BID Venu Bowden MD   650 mg at 01/04/22 2202    carvedilol (COREG) tablet 3.125 mg  3.125 mg Oral BID WC Venu Bowden MD   3.125 mg at 01/04/22 1656    anticoagulant sodium citrate 4 % injection 1.9 mL  1.9 mL IntraCATHeter PRN Venu Bowden MD        anticoagulant sodium citrate 4 % injection 1.9 mL  1.9 mL IntraCATHeter PRN Venu Bowden MD   1.9 mL at 01/03/22 1201    tiZANidine (ZANAFLEX) tablet 4 mg  4 mg Oral Q6H PRN Carlene French MD   4 mg at 12/30/21 0123    tiZANidine (ZANAFLEX) tablet 2 mg  2 mg Oral TID Carlene French MD   2 mg at 01/04/22 2202    lidocaine 4 % external patch 1 patch  1 patch TransDERmal Daily Isaura Campbell DO   1 patch at 01/04/22 0805    aspirin chewable tablet 81 mg  81 mg Oral Daily Rick West MD   81 mg at 01/04/22 0800    atorvastatin (LIPITOR) tablet 80 mg  80 mg Oral Daily Rick West MD   80 mg at 01/04/22 0800    busPIRone (BUSPAR) tablet 7.5 mg  7.5 mg Oral TID Rick West MD   7.5 mg at 01/04/22 2201    docusate sodium (COLACE) capsule 100 mg  100 mg Oral BID Rick West MD   100 mg at 01/04/22 2202    apixaban (ELIQUIS) tablet 5 mg  5 mg Oral BID Rick West MD   5 mg at 01/04/22 2202    febuxostat (ULORIC) tablet 40 mg  40 mg Oral Daily Rick West MD   40 mg at 01/04/22 0804    gabapentin (NEURONTIN) capsule 300 mg  300 mg Oral BID Rick West MD   300 mg at 01/04/22 2202    insulin glargine (LANTUS) injection vial 73 Units  73 Units SubCUTAneous BID Rick West MD   73 Units at 01/04/22 2204    pantoprazole (PROTONIX) tablet 40 mg  40 mg Oral CHERYL West MD   40 mg at 01/04/22 0800    polyethylene glycol (GLYCOLAX) packet 17 g  17 g Oral Daily Jose Silverman MD   17 g at 01/04/22 0805    ranolazine (RANEXA) extended release tablet 1,000 mg  1,000 mg Oral BID Jose Silverman MD   1,000 mg at 01/04/22 2201    senna (SENOKOT) tablet 17.2 mg  2 tablet Oral Daily PRN Jose Silverman MD        tamsulosin [facility]) capsule 0.4 mg  0.4 mg Oral Daily Jose Silverman MD   0.4 mg at 01/04/22 0803    traZODone (DESYREL) tablet 75 mg  75 mg Oral Nightly Jose Silvreman MD   75 mg at 01/04/22 2202    glucose (GLUTOSE) 40 % oral gel 15 g  15 g Oral PRN Jose Silverman MD        dextrose 50 % IV solution  12.5 g IntraVENous PRN Jose Silverman MD        glucagon (rDNA) injection 1 mg  1 mg IntraMUSCular PRN Jose Silverman MD        dextrose 5 % solution  100 mL/hr IntraVENous PRN Jose Silverman MD        sodium chloride flush 0.9 % injection 5-40 mL  5-40 mL IntraVENous 2 times per day Jose Silverman MD   10 mL at 01/01/22 2218    sodium chloride flush 0.9 % injection 5-40 mL  5-40 mL IntraVENous RUSTYN Jose Silverman MD        0.9 % sodium chloride infusion  25 mL IntraVENous RUSTYN Jose Silverman MD        ondansetron (ZOFRAN-ODT) disintegrating tablet 4 mg  4 mg Oral Q8H PRN Jose Silverman MD        Or    ondansetron [facility]) injection 4 mg  4 mg IntraVENous Q6H PRN Jose Silverman MD        polyethylene glycol [facility]) packet 17 g  17 g Oral Daily PRN Jose Silverman MD        acetaminophen (TYLENOL) tablet 650 mg  650 mg Oral Q6H PRN Jose Silverman MD   650 mg at 01/03/22 2114    Or    acetaminophen (TYLENOL) suppository 650 mg  650 mg Rectal Q6H PRN Jose Silverman MD        insulin lispro (HUMALOG) injection vial 0-18 Units  0-18 Units SubCUTAneous TID WC Jose Silverman MD   6 Units at 01/04/22 1656    insulin lispro (HUMALOG) injection vial 0-9 Units  0-9 Units SubCUTAneous Nightly Jose Silverman MD   2 Units at 01/04/22 2203    HYDROcodone-acetaminophen (NORCO) 5-325 MG per tablet 1 tablet  1 tablet Oral Q6H NADIR Osorio MD   1 tablet at 01/03/22 3546     No intake or output data in the 24 hours ending 01/05/22 6999  Recent Results (from the past 24 hour(s))   POC Glucose Fingerstick    Collection Time: 01/04/22  6:44 AM   Result Value Ref Range    POC Glucose 121 (H) 65 - 105 mg/dL   POC Glucose Fingerstick    Collection Time: 01/04/22  7:44 AM   Result Value Ref Range    POC Glucose 92 65 - 105 mg/dL   POC Glucose Fingerstick    Collection Time: 01/04/22 11:02 AM   Result Value Ref Range    POC Glucose 226 (H) 65 - 105 mg/dL     -----------------------------------------------------------------  RAD:  EKG:  Micro:     Assessment & Plan:    Patient Active Problem List:     Atherosclerosis of coronary artery bypass graft of native heart without angina pectoris     Acute renal failure (HCC)     Diabetes mellitus due to underlying condition with diabetic nephropathy, with long-term current use of insulin ([facility])     Chronic diastolic heart failure ([facility])     Diabetic polyneuropathy associated with type 2 diabetes mellitus ([facility] [address]     History of coronary artery bypass graft     Iron deficiency anemia     Spinal stenosis of lumbar region with neurogenic claudication     Mixed hyperlipidemia     Stage 4 chronic kidney disease ([facility])     Type 2 diabetes mellitus with kidney complication, with long-term current use of insulin ([facility])     Syncope and collapse     Obesity, Class II, BMI 35-39.9     Thyroid nodule greater than or equal to 1 cm in diameter incidentally noted on imaging study     Essential hypertension     Anemia in stage 4 chronic kidney disease (HCC)     Chronic ischemic heart disease     Ischemic stroke of frontal lobe ([facility])     Stroke-like symptoms     Morbid obesity with BMI of 40.0-44.9, adult ([facility])     Acute encephalopathy     Disequilibrium syndrome     Generalized weakness     Long-term memory loss     Muscle right arm weakness     Anxiety     Chronic midline low back pain with bilateral sciatica     Need for immunization against influenza     Altered mental status     Acute right-sided thoracic back pain     Tremor     Metabolic encephalopathy           Plan:  -Metabolic encephalopathy - mental status back at baseline. -ESRD on dialysis - Nephrology managing. -DM2 - stable.  -Emotional lability - Psychiatry input appreciated. -D/C planning ongoing - ARU vs SNF.  -Continue current treatments.  -Complete orders per chart.     See orders   Disposition:    Electronically signed by Rosemarie Martinez MD on 1/5/2022 at 6:13 AM

## 2022-01-05 NOTE — PROGRESS NOTES
Department of Internal Medicine  Nephrology John Giron MD   Progress Note    Reason for consultation: Management of hemodialysis dependent end-stage renal disease. Consulting physician: Emelia Godfrey MD    Interval history:  Patient was seen and examined today. Patient was seen today at dialysis. She is about 6 to 7 kg above her dry weight. Her blood pressure is stable. She did have shortness of breath with walking this morning required oxygen. Patient has swelling in the lower extremity  History of present illness: This is a 59 y.o. female with a significant past medical history of Chronic atrial fibrillation [on Eliquis], Type 2 diabetes mellitus, essential systemic hypertension, coronary artery disease [s/p CABG in 2004 and PTCA in 2019], heart failure with preserved ejection fraction [LVEF 55%] and end-stage renal disease secondary to diabetic and hypertensive nephropathy [on routine hemodialysis on a Monday/Wednesday/Friday schedule at [address] [place] [address] dialysis unit on [facility] under the care of Dr. Paco Henderson since August 2001 using IJ tunneled dialysis catheter], who presented to the hospital via EMS for evaluation of worsening low back pain rated 10/10. She was seen and examined this morning and she remains quite lethargic and stuporous and difficult to arouse. She however is not in acute respiratory distress.     Scheduled Meds:   epoetin raphael-epbx  3,000 Units SubCUTAneous Once per day on Mon Wed Fri    sodium bicarbonate  650 mg Oral BID    carvedilol  3.125 mg Oral BID WC    tiZANidine  2 mg Oral TID    lidocaine  1 patch TransDERmal Daily    aspirin  81 mg Oral Daily    atorvastatin  80 mg Oral Daily    busPIRone  7.5 mg Oral TID    docusate sodium  100 mg Oral BID    apixaban  5 mg Oral BID    febuxostat  40 mg Oral Daily    gabapentin  300 mg Oral BID    insulin glargine  73 Units SubCUTAneous BID    pantoprazole  40 mg Oral QAM AC    polyethylene glycol  17 g Oral Daily    ranolazine  1,000 mg Oral BID    tamsulosin  0.4 mg Oral Daily    traZODone  75 mg Oral Nightly    sodium chloride flush  5-40 mL IntraVENous 2 times per day    insulin lispro  0-18 Units SubCUTAneous TID WC    insulin lispro  0-9 Units SubCUTAneous Nightly     Continuous Infusions:   dextrose      sodium chloride       PRN Meds:.anticoagulant sodium citrate, anticoagulant sodium citrate, tiZANidine, senna, glucose, dextrose, glucagon (rDNA), dextrose, sodium chloride flush, sodium chloride, ondansetron **OR** ondansetron, polyethylene glycol, acetaminophen **OR** acetaminophen, HYDROcodone 5 mg - acetaminophen    Physical Exam:    VITALS:  BP (!) 135/53   Pulse 61   Temp 98.3 °F (36.8 °C) (Oral)   Resp 18   Ht 5' 5\" (1.651 m)   Wt 262 lb 9.1 oz (119.1 kg)   SpO2 98%   BMI 43.69 kg/m²   24HR INTAKE/OUTPUT:    No intake or output data in the 24 hours ending 01/05/22 1137    Constitutional: slowed mentation and Lethargic and difficult to arouse    Skin: Skin color, texture, turgor normal. No rashes or lesions    Head: Normocephalic, without obvious abnormality, atraumatic     Cardiovascular/Edema: regular rate and rhythm, S1, S2 normal, no murmur, click, rub or gallop    Respiratory: Lungs: clear to auscultation bilaterally    Abdomen: soft, non-tender; bowel sounds normal; no masses,  no organomegaly    Back: symmetric, no curvature. ROM normal. No CVA tenderness. Extremities: edema +    Neuro:  Stuporous and lethargic. CBC:   No results for input(s): WBC, HGB, PLT in the last 72 hours.   BMP:    Recent Labs     01/03/22  0631 01/05/22  0921   * 132*   K 4.7 5.2   CL 93* 94*   CO2 25 23   BUN 40* 41*   CREATININE 4.89* 4.09*   GLUCOSE 158* 154*     Lab Results   Component Value Date    NITRU NEGATIVE 11/14/2021    COLORU Yellow 11/14/2021    PHUR 5.0 11/14/2021    WBCUA 5 TO 10 11/14/2021    RBCUA 0 TO 2 11/14/2021    MUCUS NOT REPORTED 11/14/2021    TRICHOMONAS NOT REPORTED 11/14/2021    YEAST FEW 11/14/2021    BACTERIA MANY 11/14/2021    SPECGRAV 1.014 11/14/2021    LEUKOCYTESUR TRACE 11/14/2021    UROBILINOGEN Normal 11/14/2021    BILIRUBINUR NEGATIVE 11/14/2021    GLUCOSEU 3+ 11/14/2021    KETUA NEGATIVE 11/14/2021    AMORPHOUS NOT REPORTED 11/14/2021     Urine Sodium:     Lab Results   Component Value Date    JASON 47 11/14/2021     Urine Chloride:    Lab Results   Component Value Date    CLUR 26 11/14/2021     Urine Protein:     Lab Results   Component Value Date    TPU 20 11/12/2021     Urine Creatinine:     Lab Results   Component Value Date    LABCREA 72.0 11/14/2021     IMPRESSION/RECOMMENDATIONS:      1.  End-stage renal disease - we will maintain MWF hemodialysis schedule. Vascular access is IJ tunneled hemodialysis catheter. Status post left arm AV fistula placement November 2021 which is maturing  Renal diet,i.e 2-gram sodium,2-gram potassium,1200 ml fluid restriction,1-gram phosphorus, 1800 KCal and 1.2 gram protein per day. 2.  Altered mental status -improved   Mental status is back to baseline. 3.   Normocytic anemia of chronic kidney disease -Retacrit 3 times weekly    4. Systemic hypertension - blood pressure control is adequate. 5.  Mild hyperkalemia-2 g potassium diet    6. Hyponatremia  Plan  Continue hemodialysis Monday Wednesday Friday  1200 mils fluid restriction  Attempt 3 kg with dialysis today  Check basic metabolic panel and CBC on dialysis days      Prognosis is guarded.     Jomar Galdamez MD   Attending Nephrologist  1/5/2022 11:37 AM

## 2022-01-05 NOTE — PLAN OF CARE
Problem: Falls - Risk of:  Goal: Will remain free from falls  Description: Will remain free from falls  1/5/2022 0420 by Andi Gomez RN  Outcome: Ongoing  Note: Patient makes no attempts to get out of bed per self, alert and oriented and calls for assistance   1/4/2022 1738 by Josh Cannon RN  Outcome: Ongoing  Note: Pt remained free of falls during shift.    Goal: Absence of physical injury  Description: Absence of physical injury  1/5/2022 0420 by Andi Gomez RN  Outcome: Ongoing  1/4/2022 1738 by Josh Cannon RN  Outcome: Ongoing     Problem: Pain:  Goal: Pain level will decrease  Description: Pain level will decrease  1/5/2022 0420 by Andi Gomez RN  Outcome: Ongoing  Note: Patient has no complaints of pain, no medication given this shift   1/4/2022 1738 by Josh Cannon RN  Outcome: Ongoing  Goal: Control of acute pain  Description: Control of acute pain  1/5/2022 0420 by Andi Gomez RN  Outcome: Ongoing  1/4/2022 1738 by Josh Cannon RN  Outcome: Ongoing     Problem: Skin Integrity:  Goal: Will show no infection signs and symptoms  Description: Will show no infection signs and symptoms  1/5/2022 0420 by Andi Gomez RN  Outcome: Ongoing  Note: Patient tolerates frequent repositioning, skin care precautions continued,  no signs of breakdown noted   1/4/2022 1738 by Josh Cannon RN  Outcome: Ongoing  Goal: Absence of new skin breakdown  Description: Absence of new skin breakdown  1/5/2022 0420 by Andi Gomez RN  Outcome: Ongoing  1/4/2022 1738 by Josh Cannon RN  Outcome: Ongoing

## 2022-01-05 NOTE — PROGRESS NOTES
[address]   OCCUPATIONAL THERAPY MISSED TREATMENT NOTE   INPATIENT   Date: 22  Patient Name: Miguel Cadena       [facility]  MRN: [id number]   Account #: [de-identified]    : 1960  (59 y.o.)  Gender: female   Referring Practitioner: Deisi Boogie MD  Diagnosis: Acute right-sided thoracic back pain             REASON FOR MISSED TREATMENT:  Patient at testing and/or off the floor   -   [phone] Pt out of room for Dialysis.  Occupational therapy will continue to follow         Rocco Ivory OT

## 2022-01-05 NOTE — PLAN OF CARE
Problem: Falls - Risk of:  Goal: Will remain free from falls  Description: Will remain free from falls  1/5/2022 1507 by Saima Perry RN  Outcome: Ongoing  Note: Pt remained free of falls during shift.    1/5/2022 0420 by Clay Martin RN  Outcome: Ongoing  Note: Patient makes no attempts to get out of bed per self, alert and oriented and calls for assistance   Goal: Absence of physical injury  Description: Absence of physical injury  1/5/2022 1507 by Saima Perry RN  Outcome: Ongoing  1/5/2022 0420 by Clay Martin RN  Outcome: Ongoing     Problem: Pain:  Goal: Pain level will decrease  Description: Pain level will decrease  1/5/2022 1507 by Saima Perry RN  Outcome: Ongoing  1/5/2022 0420 by Clay Martin RN  Outcome: Ongoing  Note: Patient has no complaints of pain, no medication given this shift   Goal: Control of acute pain  Description: Control of acute pain  1/5/2022 1507 by Saima Perry RN  Outcome: Ongoing  1/5/2022 0420 by Clay Martin RN  Outcome: Ongoing     Problem: Skin Integrity:  Goal: Will show no infection signs and symptoms  Description: Will show no infection signs and symptoms  1/5/2022 1507 by Saima Perry RN  Outcome: Ongoing  1/5/2022 0420 by Clay Martin RN  Outcome: Ongoing  Note: Patient tolerates frequent repositioning, skin care precautions continued,  no signs of breakdown noted   Goal: Absence of new skin breakdown  Description: Absence of new skin breakdown  1/5/2022 1507 by Saima Perry RN  Outcome: Ongoing  1/5/2022 0420 by Clay Martin RN  Outcome: Ongoing

## 2022-01-05 NOTE — PROGRESS NOTES
Patient recevied call that father was in hospital and not doing well. Patient asked for writer to come into room to talk to her. Writer helped calm patient down but was unable to stay in the room for as long as the patient needed at the time. Writer contacted spiritual services to come up and talk with patient. Patient very thankful.

## 2022-01-05 NOTE — PROGRESS NOTES
HEMODIALYSIS POST TREATMENT NOTE    Treatment time ordered: 3Hrs    Actual treatment time: 3Hrs    UltraFiltration Goal: 3Kgs  UltraFiltration Removed: 3Kgs      Pre Treatment weight: 118.9Kgs  Post Treatment weight: 115.9Kgs  Estimated Dry Weight: 111Kgs    Access used:     Central Venous Catheter:          Tunneled or Non-tunneled: Tunneled           Site: R chest          Access Flow: good      Internal Access:       AV Fistula or AV Graft: AVF not yet mature         Site: Left upper arm       Access Flow: Thrill & Bruit present       Sign and symptoms of infection: No       If YES: N/A    Medications or blood products given: N/A    Chronic outpatient schedule: [facility]    Chronic outpatient unit: [facility]    Summary of response to treatment: Tolerated fairly well with no issues throughout trx, tolerated 3kgs fluid removal.    Explain if orders NOT met, was physician notified:N/A      ACES flowsheet faxed to patient unit/ placed in patient chart: yes    Post assessment completed: yes by Cassie Brambila RN    Report given to: KENZIE Hernandez 36 documented Safety Checks include the followin) Access and face visible at all times. 2) All connections and blood lines are secure with no kinks. 3) NVL alarm engaged. 4) Hemosafe device applied (if applicable). 5) No collapse of Arterial or Venous blood chambers. 6) All blood lines / pump segments in the air detectors.

## 2022-01-05 NOTE — PROGRESS NOTES
Physical Therapy  Facility/Department: [facility] MED SURG  Daily Treatment Note  NAME: Jim Lomax  : 1960  MRN: [id number]    Date of Service: 2022    Discharge Recommendations:  Home with assist PRN   PT Equipment Recommendations  Equipment Needed: No    Assessment   Body structures, Functions, Activity limitations: Decreased functional mobility ; Decreased strength; Increased pain  Assessment: Impaired mobility due to pain and safety issues of decreased balance and strength  Decision Making: Low Complexity  History: chronic back pain  Exam: decreased strength, balance; increased pain  Clinical Presentation: stable  Barriers to Learning: Anxiety/tearful  REQUIRES PT FOLLOW UP: Yes  Activity Tolerance  Activity Tolerance: Patient limited by fatigue;Patient limited by endurance  Activity Tolerance: Frequent rest breaks PRN  Other Comments  Comments: Treatment ended d/t pt being taken down to dialysis     Patient Diagnosis(es): The encounter diagnosis was Acute right-sided thoracic back pain. has a past medical history of Backache, unspecified, CHF (congestive heart failure) ([address] Chronic kidney disease, Coronary atherosclerosis of artery bypass graft, Cramp of limb, Gallstones, Hypertension, Insomnia, Pneumonia, Type II or unspecified type diabetes mellitus with renal manifestations, not stated as uncontrolled(250.40), Type II or unspecified type diabetes mellitus without mention of complication, not stated as uncontrolled, and Unspecified vitamin D deficiency. has a past surgical history that includes Coronary artery bypass graft; Knee arthroscopy; Carpal tunnel release; Breast surgery; Tonsillectomy; Hand surgery; Ankle fracture surgery; Cholecystectomy, open (N/A); IR TUNNELED CVC PLACE WO SQ PORT/PUMP > 5 YEARS (2021); AV fistula creation (2021); and Dialysis fistula creation (Left, 2021).     Restrictions  Restrictions/Precautions  Restrictions/Precautions: General Precautions,Fall Risk  Required Braces or Orthoses?: No  Subjective   General  Chart Reviewed: Yes  Response To Previous Treatment: Patient with no complaints from previous session. Family / Caregiver Present: No  Subjective  Subjective: Pt is in bed upon arrival. Pt is pleasant and agreeable to PT. Pt reports sleeping well until \"4AM\". Pain Screening  Patient Currently in Pain: Denies  Vital Signs  Patient Currently in Pain: Denies  Oxygen Therapy  SpO2: 98 %  O2 Device: Nasal cannula  O2 Flow Rate (L/min): 1 L/min  Patient Observation  Observations: Tremors continued to be present intermittently through BUE and BLE       Orientation  Orientation  Overall Orientation Status: Within Functional Limits  Objective   Bed mobility  Rolling to Left: Supervision  Rolling to Right: Supervision  Supine to Sit: Contact guard assistance;Stand by assistance  Scooting: Stand by assistance  Comment: Pt initially demos poor sitting balance as evidence by uncontrolled left posterior lean. Pt's sitting balance improves with increase time, SBA. PT is left sitting up in her recliner, call light in reach, and all needs addressed. Transfers  Sit to Stand: Contact guard assistance  Stand to sit: Contact guard assistance  Comment: standing with RW. Min cues for hand placement. Ambulation  Ambulation?: Yes  Ambulation 1  Surface: level tile  Device: Rolling Walker  Other Apparatus: O2 (1L)  Assistance: Contact guard assistance  Quality of Gait: Steady, no LOB. No buckling of knees this date. Pt amb to restroom initially, requires a rest break, then able to amb in yang. Gait Deviations: Increased ELISABET; Decreased step length;Decreased step height  Distance: 8'x2 and 45'x2  Comments: SPO2 is 98% on 1lpm of O2 pre-amb. Pt requested to amb without O2 however requested to use the restroom first. Pt's SPO2 post toileting is 91%. seated rest break in between distances. SpO2 is 63% on RA recovering to 92% and  HR 69 with O2 donned at 1lpm post ambulation.  Pt requires ~2min to recover. RN Zeenat informed. Stairs/Curb  Stairs?: No     Balance  Posture: Fair  Sitting - Static: Fair;+  Sitting - Dynamic: Fair  Standing - Static: Fair  Standing - Dynamic: Fair;+  Comments: Standing with RW, pt easily fatigued. Other exercises  Other exercises?: Yes  Other exercises 2: STS x 3 from EOB and toilet this date. Other exercises 3: Pt requires Total A to don lymphadema socks and  socks. Pt reports she is normally able to complete task at baseline with increase time. Other exercises 4: Education on safe technique for use of RW, pursed lip breathing, and body positioning  Other exercises 5: Standing functional dynamic tasks x3. SBA for safety. Other Activities: Other (see comment) (worked on relaxation techs)  Comment: rest breaks PRN. Goals  Short term goals  Time Frame for Short term goals: 2-3 days  Short term goal 1: mod-I bed mobility  Short term goal 2: mod-I transfers  Short term goal 3: mod-I gait with RW x 50-100ft    Plan    Plan  Times per week: 5-6x/wk  Current Treatment Recommendations: Strengthening,Balance Training,Functional Mobility Training,Transfer Training,Gait Training  Safety Devices  Type of devices:  All fall risk precautions in place,Call light within reach,Gait belt,Nurse notified,Left in chair     Therapy Time   Individual Concurrent Group Co-treatment   Time In 0836         Time Out 0916         Minutes [address] [place]

## 2022-01-05 NOTE — PROGRESS NOTES
HEMODIALYSIS PRE-TREATMENT NOTE    Patient Identifiers prior to treatment: Name//MRN    Isolation Required:  Yes                      Isolation Type: Contact        (please document if patient is being managed as a PUI/COVID-19 patient)        Hepatitis status:                           Date Drawn                             Result  Hepatitis B Surface Antigen 2021     NEG                     Hepatitis B Surface Antibody 2021 POS     48.5   Hepatitis B Core Antibody 2021 NEG          How was Hepatitis Status verified: Epic chart     Was a copy of the labs you documented provided to facility for the patient's chart: yes    Hemodialysis orders verified: yes    Access Within normal limits ( I.e. s/s of infection,...): yes     Pre-Assessment completed: yes by Josefina Adame RN    Pre-dialysis report received from: Zeenat                      Time: 11:05

## 2022-01-05 NOTE — PROGRESS NOTES
BS was rechecked however the doc did not send the number so writer had PCT recheck it again and it was 173.

## 2022-01-05 NOTE — PROGRESS NOTES
Physical Medicine & Rehabilitation  Progress Note        Admitting Physician: No att. providers found    Primary Care Provider: AFSANEH Meadows CNP     Chief Complaint: Back pain    Brief History: This is a follow up to the initial consult on MsLuzma Lovelace is a 59 y.o. right handed female who was admitted to [facility] on 12/23/2021 with Back Pain    Patient with AMS and R upper back pain, ESRD on HD MWF, normocytic anemia of CKD, HTN, mild Hyperkalemia and hyponatremia. Nephrology managing dialysis and has on 1200 ml fluid restriction. Subjective: The patient is resting comfortably and reporting back pain has resolved. Mental status has improved to baseline The patient's mobility is unchanged and diminished from baseline. She continues to exhibit asterixis in [address] [facility] [address]. ROS:  Constitutional: negative for anorexia, chills, fatigue, fevers, sweats and weight loss  Eyes: negative for redness and visual disturbance  Ears, nose, mouth, throat, and face: negative for earaches, epistaxis, sore throat and tinnitus  Respiratory: negative for cough and shortness of breath  Cardiovascular: negative for chest pain, dyspnea and palpitations  Gastrointestinal: negative for abdominal pain, change in bowel habits, constipation, nausea and vomiting  Genitourinary:negative for dysuria, frequency, hesitancy and urinary incontinence  Integument/breast: negative for pruritus and rash  Musculoskeletal: negative for stiff joints  Neurological: negative for dizziness, headaches   Behavioral/Psych: negative for decreased appetite, positive for depression and fatigue    Rehabilitation:   Progressing in therapies. PT:  Restrictions/Precautions: General Precautions,Fall Risk   Transfers  Sit to Stand: Contact guard assistance  Stand to sit: Contact guard assistance  Bed to Chair: Contact guard assistance  Stand Pivot Transfers: Contact guard assistance  Comment: standing with RW. Min cues for hand placement. Ambulation 1  Surface: level tile  Device: Rolling Walker  Other Apparatus: O2 (1L)  Assistance: Contact guard assistance  Quality of Gait: Steady, no LOB. No buckling of knees this date. Pt amb to restroom initially, requires a rest break, then able to amb in yang. Gait Deviations: Increased ELISABET,Decreased step length,Decreased step height  Distance: 8'x2 and 45'x2  Comments: SPO2 is 98% on 1lpm of O2 pre-amb. Pt requested to amb without O2 however requested to use the restroom first. Pt's SPO2 post toileting is 91%. seated rest break in between distances. SpO2 is 63% on RA recovering to 92% and  HR 69 with O2 donned at 1lpm post ambulation. Pt requires ~2min to recover. DEDE Epperson informed. Transfers  Sit to Stand: Contact guard assistance  Stand to sit: Contact guard assistance  Bed to Chair: Contact guard assistance  Stand Pivot Transfers: Contact guard assistance  Comment: standing with RW. Min cues for hand placement. Ambulation  Ambulation?: Yes  Ambulation 1  Surface: level tile  Device: Rolling Walker  Other Apparatus: O2 (1L)  Assistance: Contact guard assistance  Quality of Gait: Steady, no LOB. No buckling of knees this date. Pt amb to restroom initially, requires a rest break, then able to amb in yang. Gait Deviations: Increased ELISABET,Decreased step length,Decreased step height  Distance: 8'x2 and 45'x2  Comments: SPO2 is 98% on 1lpm of O2 pre-amb. Pt requested to amb without O2 however requested to use the restroom first. Pt's SPO2 post toileting is 91%. seated rest break in between distances. SpO2 is 63% on RA recovering to 92% and  HR 69 with O2 donned at 1lpm post ambulation. Pt requires ~2min to recover. DEDE Epperson informed. Surface: level tile  Ambulation 1  Surface: level tile  Device: Rolling Walker  Other Apparatus: O2 (1L)  Assistance: Contact guard assistance  Quality of Gait: Steady, no LOB. No buckling of knees this date.  Pt amb to restroom initially, requires a rest break, then able to amb in yang. Gait Deviations: Increased ELISABET,Decreased step length,Decreased step height  Distance: 8'x2 and 45'x2  Comments: SPO2 is 98% on 1lpm of O2 pre-amb. Pt requested to amb without O2 however requested to use the restroom first. Pt's SPO2 post toileting is 91%. seated rest break in between distances. SpO2 is 63% on RA recovering to 92% and  HR 69 with O2 donned at 1lpm post ambulation. Pt requires ~2min to recover. RN Zeenat informed. OT:  ADL  Feeding: Setup,Adaptive utensils (comment)  Grooming: Setup  UE Bathing: Minimal assistance  LE Bathing: Moderate assistance  UE Dressing: Minimal assistance  LE Dressing: Moderate assistance  Toileting: Moderate assistance  Additional Comments: ADL scores based on clinical reasoning and skilled observation unless otherwise noted. Pt currrently limited due to decreased strength, balance, activity, and increased pain impacting safety and independence with self care tasks. Pt reports she showered this morning with assist from nursing aides. Balance  Sitting Balance: Stand by assistance (sitting in recliner)  Standing Balance: Unable to assess(comment) (standing deferred due to increased )   Standing Balance  Time: 1 minute  Activity: Small steps from EOB to recliner chair  Comment: with RW. Pt completed stand/transfer with min A x 2 with PTA providing stabilization to L knee due. Functional Mobility  Functional - Mobility Device: Rolling Walker  Activity: Other (small steps from bed to recliner chiar)  Assist Level: Minimal assistance (x2)  Functional Mobility Comments: Functional mobility completed with 1 person A supporting LLE. Verbal cues for hand placement and safety.       Bed mobility  Rolling to Left: Supervision  Rolling to Right: Supervision  Supine to Sit: Contact guard assistance,Stand by assistance  Sit to Supine: Unable to assess  Scooting: Stand by assistance  Comment: Pt initially demos poor sitting balance as evidence by uncontrolled left posterior lean. Pt's sitting balance improves with increase jeff, SBA. PT is left sitting up in her recliner, call light in reach, and all needs addressed. Transfers  Sit to stand: 2 Person assistance (Min A x 1 and CGA x 1 at L knee)  Stand to sit: 2 Person assistance (Min A x 1 and CGA x 1 at L knee)  Transfer Comments: Verbal cues for hand placement and safety. Pt educated on pursed lip breathing and relaxation techniques to assist with main management during transfers. SLP:      Objective:  BP (!) 157/67   Pulse 67   Temp 98.3 °F (36.8 °C)   Resp 18   Ht 5' 5\" (1.651 m)   Wt 262 lb 2 oz (118.9 kg)   SpO2 98%   BMI 43.62 kg/m²       GEN: well developed, well nourished, in NAD  HEENT: NCAT, PERRL, EOMI, mucous membranes pink and moist  CV: bradycardic rate regular rhythm, no murmurs, rubs or gallops  PULM: CTAB, no rales or rhonchi. Respirations WNL and unlabored  ABD: soft, NT, ND, BS+ and equal  NEURO: A&O x3. Sensation intact to light touch. Axterixis BUEs with impaired coordination. Unable to perform finger-nose-finger today  MSK: Functional ROM BUE and BLEs but with impaired coordination due to asterixis. Strength 4/5 key muscles all extremities. EXTREMITIES: No calf tenderness to palpation bilaterally. No edema BLEs  SKIN: warm dry and intact with good turgor  PSYCH: appropriately interactive. Affect WNL.      Current Medications:   Current Facility-Administered Medications: epoetin raphael-epbx (RETACRIT) injection 3,000 Units, 3,000 Units, SubCUTAneous, Once per day on Mon Wed Fri  sodium bicarbonate tablet 650 mg, 650 mg, Oral, BID  carvedilol (COREG) tablet 3.125 mg, 3.125 mg, Oral, BID WC  anticoagulant sodium citrate 4 % injection 1.9 mL, 1.9 mL, IntraCATHeter, PRN  anticoagulant sodium citrate 4 % injection 1.9 mL, 1.9 mL, IntraCATHeter, PRN  tiZANidine (ZANAFLEX) tablet 4 mg, 4 mg, Oral, Q6H PRN  tiZANidine (ZANAFLEX) tablet 2 mg, 2 mg, Oral, TID  lidocaine 4 % external patch 1 patch, 1 patch, TransDERmal, Daily  aspirin chewable tablet 81 mg, 81 mg, Oral, Daily  atorvastatin (LIPITOR) tablet 80 mg, 80 mg, Oral, Daily  busPIRone (BUSPAR) tablet 7.5 mg, 7.5 mg, Oral, TID  docusate sodium (COLACE) capsule 100 mg, 100 mg, Oral, BID  apixaban (ELIQUIS) tablet 5 mg, 5 mg, Oral, BID  febuxostat (ULORIC) tablet 40 mg, 40 mg, Oral, Daily  gabapentin (NEURONTIN) capsule 300 mg, 300 mg, Oral, BID  insulin glargine (LANTUS) injection vial 73 Units, 73 Units, SubCUTAneous, BID  pantoprazole (PROTONIX) tablet 40 mg, 40 mg, Oral, QAM AC  polyethylene glycol (GLYCOLAX) packet 17 g, 17 g, Oral, Daily  ranolazine (RANEXA) extended release tablet 1,000 mg, 1,000 mg, Oral, BID  senna (SENOKOT) tablet 17.2 mg, 2 tablet, Oral, Daily PRN  tamsulosin (FLOMAX) capsule 0.4 mg, 0.4 mg, Oral, Daily  traZODone (DESYREL) tablet 75 mg, 75 mg, Oral, Nightly  glucose (GLUTOSE) 40 % oral gel 15 g, 15 g, Oral, PRN  dextrose 50 % IV solution, 12.5 g, IntraVENous, PRN  glucagon (rDNA) injection 1 mg, 1 mg, IntraMUSCular, PRN  dextrose 5 % solution, 100 mL/hr, IntraVENous, PRN  sodium chloride flush 0.9 % injection 5-40 mL, 5-40 mL, IntraVENous, 2 times per day  sodium chloride flush 0.9 % injection 5-40 mL, 5-40 mL, IntraVENous, PRN  0.9 % sodium chloride infusion, 25 mL, IntraVENous, PRN  ondansetron (ZOFRAN-ODT) disintegrating tablet 4 mg, 4 mg, Oral, Q8H PRN **OR** ondansetron (ZOFRAN) injection 4 mg, 4 mg, IntraVENous, Q6H PRN  polyethylene glycol (GLYCOLAX) packet 17 g, 17 g, Oral, Daily PRN  acetaminophen (TYLENOL) tablet 650 mg, 650 mg, Oral, Q6H PRN **OR** acetaminophen (TYLENOL) suppository 650 mg, 650 mg, Rectal, Q6H PRN  insulin lispro (HUMALOG) injection vial 0-18 Units, 0-18 Units, SubCUTAneous, TID WC  insulin lispro (HUMALOG) injection vial 0-9 Units, 0-9 Units, SubCUTAneous, Nightly  HYDROcodone-acetaminophen (NORCO) 5-325 MG per tablet 1 tablet, 1 tablet, Oral, Q6H PRN      Diagnostics: CBC: No results for input(s): WBC, RBC, HGB, HCT, MCV, RDW, PLT in the last 72 hours. BMP:   Recent Labs     01/03/22  0631 01/05/22  0921   * 132*   K 4.7 5.2   CL 93* 94*   CO2 25 23   BUN 40* 41*   CREATININE 4.89* 4.09*     BNP: No results for input(s): BNP in the last 72 hours. PT/INR: No results for input(s): PROTIME, INR in the last 72 hours. APTT: No results for input(s): APTT in the last 72 hours. CARDIAC ENZYMES: No results for input(s): CKMB, CKMBINDEX, TROPONINT in the last 72 hours. Invalid input(s): CKTOTAL;3 troponins   FASTING LIPID PANEL:  Lab Results   Component Value Date    CHOL 105 04/09/2015    HDL 43 04/09/2015    TRIG 168 04/09/2015     LIVER PROFILE: No results for input(s): AST, ALT, ALB, BILIDIR, BILITOT, ALKPHOS in the last 72 hours. Results for Oksana Mcmahan (MRN [id number]) as of 1/3/2022 21:37   Ref. Range 12/26/2021 20:12   Ammonia Latest Ref Range: 11 - 51 umol/L <10 (L)       Impression/Plan:    1. Metabolic encephalopathy: Resolved  2. ESRD: on HD MWF, nephrology managing  3. DM II: on Lantus and sliding scale  4. Normocytic anemia of chronic disease: Hb low but stable, on epogen TIW  5. Asterixis: MRI brain negative for acute findings, ammonia level WNL, EEG recommended in neuro consult 12/26- no results available. Last EEG 11/12/21 was negative for epileptiform activity. 6. HTN/Chronic diastolic heart failure: on carvedilol  7. Acute Thoracic back pain: resolved. 8. Atherosclerosis of CABG: on atorvastatin  9. Spinal stenosis lumbar region with neurogenic claudication: on gabapentin TID, Lidoderm patch, tizanidine TID  10. Major depressive disorder, Generalized anxiety disorder: psychiatry evaluated. Continuing on current doses Buspar, Trazodone  11. Gout: on febuxostat for prophylaxis  12. Urine retention: on flomax  13. Hx ischemic CVA    Recommendation:  1.  The patient will benefit from acute inpatient rehabilitation once medically stable per primary and consulting services. Anticipate she will be able to tolerate 3 hours of therapy per day or 900 minutes per week in rehabilitation. The patient requires multidisciplinary rehabilitation treatment including medical management by a PM&R physician, 24 hour rehabilitation nursing, Physical/Occupational therapy, rehabilitation social work, and nutrition services. Patient and family also require education in post-hospital precautions and home exercise routine, adaptive techniques and deficit compensation strategies, strengthening and conditioning, equipment prescription and instructions in use. 2. Will review neurology plan/recommendations with Dr. Inocencio Homans  3. DVT Prophylaxis: on Eliquis        Electronically signed by Mabel Rodriguez MD on 1/5/2022 at 12:26 PM       This note is created with the assistance of a speech recognition program.  While intending to generate a document that actually reflects the content of the visit, the document can still have some errors including those of syntax and sound a like substitutions which may escape proof reading. In such instances, actual meaning can be extrapolated by contextual diversion.

## 2022-01-06 LAB
ANION GAP SERPL CALCULATED.3IONS-SCNC: 14 MMOL/L (ref 9–17)
BUN BLDV-MCNC: 30 MG/DL (ref 8–23)
BUN/CREAT BLD: ABNORMAL (ref 9–20)
CALCIUM SERPL-MCNC: 9.2 MG/DL (ref 8.6–10.4)
CHLORIDE BLD-SCNC: 93 MMOL/L (ref 98–107)
CO2: 24 MMOL/L (ref 20–31)
CREAT SERPL-MCNC: 2.99 MG/DL (ref 0.5–0.9)
GFR AFRICAN AMERICAN: 19 ML/MIN
GFR NON-AFRICAN AMERICAN: 16 ML/MIN
GFR SERPL CREATININE-BSD FRML MDRD: ABNORMAL ML/MIN/{1.73_M2}
GFR SERPL CREATININE-BSD FRML MDRD: ABNORMAL ML/MIN/{1.73_M2}
GLUCOSE BLD-MCNC: 161 MG/DL (ref 65–105)
GLUCOSE BLD-MCNC: 173 MG/DL (ref 65–105)
GLUCOSE BLD-MCNC: 207 MG/DL (ref 65–105)
GLUCOSE BLD-MCNC: 215 MG/DL (ref 65–105)
GLUCOSE BLD-MCNC: 323 MG/DL (ref 65–105)
GLUCOSE BLD-MCNC: 403 MG/DL (ref 70–99)
GLUCOSE BLD-MCNC: 49 MG/DL (ref 65–105)
GLUCOSE BLD-MCNC: 81 MG/DL (ref 65–105)
GLUCOSE BLD-MCNC: 89 MG/DL (ref 65–105)
POTASSIUM SERPL-SCNC: 4.8 MMOL/L (ref 3.7–5.3)
SODIUM BLD-SCNC: 131 MMOL/L (ref 135–144)

## 2022-01-06 PROCEDURE — 97116 GAIT TRAINING THERAPY: CPT

## 2022-01-06 PROCEDURE — G0378 HOSPITAL OBSERVATION PER HR: HCPCS

## 2022-01-06 PROCEDURE — 6370000000 HC RX 637 (ALT 250 FOR IP): Performed by: INTERNAL MEDICINE

## 2022-01-06 PROCEDURE — 97110 THERAPEUTIC EXERCISES: CPT

## 2022-01-06 PROCEDURE — 82947 ASSAY GLUCOSE BLOOD QUANT: CPT

## 2022-01-06 PROCEDURE — 80048 BASIC METABOLIC PNL TOTAL CA: CPT

## 2022-01-06 PROCEDURE — 6370000000 HC RX 637 (ALT 250 FOR IP): Performed by: FAMILY MEDICINE

## 2022-01-06 PROCEDURE — 6370000000 HC RX 637 (ALT 250 FOR IP): Performed by: PSYCHIATRY & NEUROLOGY

## 2022-01-06 PROCEDURE — 97530 THERAPEUTIC ACTIVITIES: CPT

## 2022-01-06 PROCEDURE — 97535 SELF CARE MNGMENT TRAINING: CPT

## 2022-01-06 PROCEDURE — 36415 COLL VENOUS BLD VENIPUNCTURE: CPT

## 2022-01-06 PROCEDURE — 99225 PR SBSQ OBSERVATION CARE/DAY 25 MINUTES: CPT | Performed by: PSYCHIATRY & NEUROLOGY

## 2022-01-06 PROCEDURE — 99225 PR SBSQ OBSERVATION CARE/DAY 25 MINUTES: CPT | Performed by: FAMILY MEDICINE

## 2022-01-06 RX ORDER — SODIUM BICARBONATE 650 MG/1
650 TABLET ORAL 2 TIMES DAILY
Qty: 60 TABLET | Refills: 0 | Status: SHIPPED | OUTPATIENT
Start: 2022-01-06 | End: 2022-03-02 | Stop reason: SDUPTHER

## 2022-01-06 RX ADMIN — INSULIN GLARGINE 73 UNITS: 100 INJECTION, SOLUTION SUBCUTANEOUS at 12:00

## 2022-01-06 RX ADMIN — TIZANIDINE 2 MG: 2 TABLET ORAL at 11:55

## 2022-01-06 RX ADMIN — TAMSULOSIN HYDROCHLORIDE 0.4 MG: 0.4 CAPSULE ORAL at 11:55

## 2022-01-06 RX ADMIN — INSULIN GLARGINE 73 UNITS: 100 INJECTION, SOLUTION SUBCUTANEOUS at 21:23

## 2022-01-06 RX ADMIN — SODIUM BICARBONATE 650 MG: 650 TABLET ORAL at 11:56

## 2022-01-06 RX ADMIN — SODIUM BICARBONATE 650 MG: 650 TABLET ORAL at 21:18

## 2022-01-06 RX ADMIN — TRAZODONE HYDROCHLORIDE 75 MG: 50 TABLET ORAL at 21:16

## 2022-01-06 RX ADMIN — RANOLAZINE 1000 MG: 500 TABLET, FILM COATED, EXTENDED RELEASE ORAL at 21:15

## 2022-01-06 RX ADMIN — CARVEDILOL 3.12 MG: 3.12 TABLET, FILM COATED ORAL at 11:56

## 2022-01-06 RX ADMIN — TIZANIDINE 2 MG: 2 TABLET ORAL at 21:16

## 2022-01-06 RX ADMIN — ATORVASTATIN CALCIUM 80 MG: 80 TABLET, FILM COATED ORAL at 11:54

## 2022-01-06 RX ADMIN — FEBUXOSTAT 40 MG: 40 TABLET, FILM COATED ORAL at 11:59

## 2022-01-06 RX ADMIN — DOCUSATE SODIUM 100 MG: 100 CAPSULE ORAL at 11:54

## 2022-01-06 RX ADMIN — DOCUSATE SODIUM 100 MG: 100 CAPSULE ORAL at 21:13

## 2022-01-06 RX ADMIN — RANOLAZINE 1000 MG: 500 TABLET, FILM COATED, EXTENDED RELEASE ORAL at 11:55

## 2022-01-06 RX ADMIN — GABAPENTIN 400 MG: 400 CAPSULE ORAL at 21:17

## 2022-01-06 RX ADMIN — DEXTROSE 15 G: 15 GEL ORAL at 06:34

## 2022-01-06 RX ADMIN — ASPIRIN 81 MG: 81 TABLET, CHEWABLE ORAL at 11:54

## 2022-01-06 RX ADMIN — CARVEDILOL 3.12 MG: 3.12 TABLET, FILM COATED ORAL at 17:40

## 2022-01-06 RX ADMIN — APIXABAN 5 MG: 5 TABLET, FILM COATED ORAL at 21:17

## 2022-01-06 RX ADMIN — DEXTROSE 15 G: 15 GEL ORAL at 06:35

## 2022-01-06 RX ADMIN — BUSPIRONE HYDROCHLORIDE 7.5 MG: 5 TABLET ORAL at 21:13

## 2022-01-06 RX ADMIN — APIXABAN 5 MG: 5 TABLET, FILM COATED ORAL at 11:56

## 2022-01-06 RX ADMIN — INSULIN LISPRO 6 UNITS: 100 INJECTION, SOLUTION INTRAVENOUS; SUBCUTANEOUS at 17:35

## 2022-01-06 RX ADMIN — BUSPIRONE HYDROCHLORIDE 7.5 MG: 5 TABLET ORAL at 11:55

## 2022-01-06 RX ADMIN — PANTOPRAZOLE SODIUM 40 MG: 40 TABLET, DELAYED RELEASE ORAL at 06:05

## 2022-01-06 RX ADMIN — INSULIN LISPRO 3 UNITS: 100 INJECTION, SOLUTION INTRAVENOUS; SUBCUTANEOUS at 21:23

## 2022-01-06 RX ADMIN — INSULIN LISPRO 12 UNITS: 100 INJECTION, SOLUTION INTRAVENOUS; SUBCUTANEOUS at 11:59

## 2022-01-06 ASSESSMENT — PAIN SCALES - GENERAL: PAINLEVEL_OUTOF10: 0

## 2022-01-06 NOTE — PROGRESS NOTES
BONNIE informed that pt will be accepted at [facility] and admitted tomorrow as that is when a bed will be available. Crow Mccarthy from [facility] is organizing pt's transport to and from dialysis. Pt can be d/c to [facility] tomorrow after dialysis.

## 2022-01-06 NOTE — PROGRESS NOTES
115/47   Pulse 63   Temp 97.9 °F (36.6 °C)   Resp 19   Ht 5' 5\" (1.651 m)   Wt 255 lb 8.2 oz (115.9 kg)   SpO2 93%   BMI 42.52 kg/m²   Temp (24hrs), Av.1 °F (36.7 °C), Min:97.9 °F (36.6 °C), Max:98.3 °F (36.8 °C)      Neurological examination:    Mental status   Alert and oriented x 3; following all commands; speech is fluent. Cranial nerves   CN II - XII intact   Motor function  Strength: 5/5 RUE, 4/5 RLE, 5/5 LUE, 4/5  LLE                  Sensory function decreased distally to proximally in lower extremities to light touch and pin     Cerebellar Not assessed . Reflex function  hyporeflexic throughout . Gait                  Not assessed           Diagnostics:      Laboratory Testing:  CBC: Recent Labs     22  1207   WBC 5.7   HGB 9.4*        BMP:    Recent Labs     22  0921   *   K 5.2   CL 94*   CO2 23   BUN 41*   CREATININE 4.09*   GLUCOSE 154*         Lab Results   Component Value Date    CHOL 105 2015    LDLCHOLESTEROL 72 2012    HDL 43 2015    TRIG 168 2015    ALT 13 2021    AST 16 2021    TSH 1.02 2014    INR 0.9 11/10/2021    LABA1C 7.6 (H) 2021    LABMICR 538 2012    [id number] 459 2012         Impression:      1. Bilateral upper extremity functional tremor  2. Metabolic encephalopathy, asterixis; currently resolved  3. Diabetic peripheral neuropathy  4. ESRD on HD  5. Anxiety    Plan:      Maintain decreased dose of gabapentin at 400 mg nightly   Zanaflex 2 mg 3 times daily   She is stable to be discharged to rehab from neurology standpoint   She can follow-up as outpatient in 6 weeks   Had long discussion at bedside with patient and sisters.  We will sign off at this time, please do not hesitate to contact with any further questions or concerns.       Electronically signed by Ling Alvarez DO on 2022 at 10:41 AM      Ling Alvarez [address] [facility]  Neurology

## 2022-01-06 NOTE — PROGRESS NOTES
Peer to Peer with Dr. Lynda Kumar from [facility]. Reviewed her medical needs and recent medication adjustments by neurology, nephrology. Reviewed her functional decline and functional tremor affecting her ADLs and mobility. He upheld denial for acute inpatient rehab. He advised that patient can appeal the decision or discharge to SNF for subacute rehabilitation. Jennifer Samuel updated.

## 2022-01-06 NOTE — FLOWSHEET NOTE
was perfect served to pt's room for emotional support.  was informed that pt's father had coded and is in the ED at [facility]. Upon arrival pt was on facetime with family and invited  in. Pt was tearful and grieving the pending loss of her father. Other family members were at [facility] ED with pt's father. Pt was father was terminally extubated at [facility].  provided a rosery and paper with instructions.  offered to pray with pt; pt requested [facility].  empowered  to pray as well. 01/05/22 2044   Encounter Summary   Services provided to: Patient   Referral/Consult From: Nurse   Continue Visiting   (01/05/2022)   Complexity of Encounter High   Length of Encounter 1 hour   Spiritual Assessment Completed Yes   Crisis   Type Emotional distress   Assessment Approachable;Tearful;Grieving; Anxious; Fearful   Intervention Active listening;Prayer;Explored feelings, thoughts, concerns;Scripture;Sustaining presence/ Ministry of presence;Grief care  (provided rosery )   Outcome Expressed gratitude;Comfort;Tearful;Less anxious, less agitated

## 2022-01-06 NOTE — PROGRESS NOTES
Physical Therapy  Facility/Department: [facility] MED SURG  Daily Treatment Note  NAME: Aleyda Carranza  : 1960  MRN: [id number]    Date of Service: 2022    Discharge Recommendations:  Home with assist PRN   PT Equipment Recommendations  Equipment Needed: No    Assessment   Body structures, Functions, Activity limitations: Decreased functional mobility ; Decreased strength; Increased pain  History: chronic back pain  Clinical Presentation: stable  Barriers to Learning: Anxiety/tearful  REQUIRES PT FOLLOW UP: Yes  Activity Tolerance  Activity Tolerance: Patient limited by fatigue;Patient limited by endurance     Patient Diagnosis(es): The encounter diagnosis was Acute right-sided thoracic back pain. has a past medical history of Backache, unspecified, CHF (congestive heart failure) ([facility] [address] Chronic kidney disease, Coronary atherosclerosis of artery bypass graft, Cramp of limb, Gallstones, Hypertension, Insomnia, Pneumonia, Type II or unspecified type diabetes mellitus with renal manifestations, not stated as uncontrolled(250.40), Type II or unspecified type diabetes mellitus without mention of complication, not stated as uncontrolled, and Unspecified vitamin D deficiency. has a past surgical history that includes Coronary artery bypass graft; Knee arthroscopy; Carpal tunnel release; Breast surgery; Tonsillectomy; Hand surgery; Ankle fracture surgery; Cholecystectomy, open (N/A); IR TUNNELED CVC PLACE WO SQ PORT/PUMP > 5 YEARS (2021); AV fistula creation (2021); and Dialysis fistula creation (Left, 2021). Restrictions  Restrictions/Precautions  Restrictions/Precautions: General Precautions,Fall Risk  Required Braces or Orthoses?: No     Subjective   General  Chart Reviewed: Yes  Family / Caregiver Present: No  Subjective  Subjective: Pt is in bed upon arrival. Patient states he father passed away night prior. Pt is agreeable to do what she can and would like to get dressed up.   General Comment  Comments: RN arielle's Pt for PT. Co-tx with GARCIA. Pain Screening  Patient Currently in Pain: No  Vital Signs  Patient Currently in Pain: No  Oxygen Therapy  O2 Device: Nasal cannula  O2 Flow Rate (L/min): 1 L/min  Patient Observation  Observations: O2 desats to 80-81% during functional mobility on 1L of O2, O2 increased to 2L and O2 sats increased to >90% afte 1 minute Leah Fong RN notified      Orientation  Orientation  Overall Orientation Status: Within Functional Limits    Objective   Bed mobility  Rolling to Right: Modified independent  Supine to Sit: Modified independent  Sit to Supine: Unable to assess (pt up in chair at end of tx)  Scooting: Modified independent     Transfers  Sit to Stand: Contact guard assistance  Stand to sit: Contact guard assistance  Bed to Chair: Contact guard assistance  Stand Pivot Transfers: Contact guard assistance  Comment: standing with RW. Min cues for hand placement. Ambulation  Ambulation?: Yes  Ambulation 1  Surface: level tile  Device: Rolling Walker  Other Apparatus: O2 (1 L)  Assistance: Stand by assistance, Contact guard assistance   Quality of Gait: Steady, no LOB. No buckling of knees this date. Pt requires standing and seated rest break. Gait Deviations: Increased ELISABET; Decreased step length;Decreased step height  Distance: 50 ft x 2  Stairs/Curb  Stairs?: No     Balance  Posture: Fair  Sitting - Static: Fair;+  Sitting - Dynamic: Fair  Standing - Static: Fair  Standing - Dynamic: Fair;+  Comments: Standing with RW, pt easily fatigued. Other exercises  Other exercises?: Yes  Other exercises 1: Seated BLE exs x 20 reps  Other exercises 2: Sitting edge of bed performing functional dynamic tasks for 35 mins (SBA)  Other exercises 3: Sit to stands with RW x 3 for pericare and functional dynamic tasks         Comment: rest breaks PRN.      Goals  Short term goals  Time Frame for Short term goals: 2-3 days  Short term goal 1: mod-I bed mobility  Short term goal 2: mod-I transfers  Short term goal 3: mod-I gait with RW x 50-100ft    Plan    Plan  Times per week: 5-6x/wk  Current Treatment Recommendations: Strengthening,Balance Training,Functional Mobility Training,Transfer Training,Gait Training  Safety Devices  Type of devices:  All fall risk precautions in place,Call light within reach,Gait belt,Nurse notified,Left in chair     Therapy Time   Individual Concurrent Group Co-treatment   Time In 0918         Time Out 1028         Minutes Tedja 61, PTA

## 2022-01-06 NOTE — PROGRESS NOTES
Rcv'd fax from [facility] with denial for ARU. Scheduled P2P with [facility] for today until 1pm and tomorrow from 9am-2. Dr Alma Reinoso from [facility] will call Dr Elvia Arndt for P2P discussion during these times. Lashay Rodriguez, notified. Alison Matthews notified that per Dr Jennifer Stearns discussion has upheld the initial denial, and pt can appeal the decision or go to SNF per Dr Alma Reinoso @ [facility].

## 2022-01-06 NOTE — PLAN OF CARE
Problem: Falls - Risk of:  Goal: Will remain free from falls  Description: Will remain free from falls  1/6/2022 0216 by Meryle Knight, RN  Outcome: Met This Shift  Note: Pt is able to make needs known, call light is within reach, bed is in low position and locked  1/5/2022 1507 by Jaky Saenz RN  Outcome: Ongoing  Note: Pt remained free of falls during shift.    Goal: Absence of physical injury  Description: Absence of physical injury  1/6/2022 0216 by Meryle Knight, RN  Outcome: Met This Shift  1/5/2022 1507 by Jaky Saenz RN  Outcome: Ongoing     Problem: Pain:  Goal: Pain level will decrease  Description: Pain level will decrease  1/6/2022 0216 by Meryle Knight, RN  Outcome: Met This Shift  Note: Pain is managed  1/5/2022 1507 by Jaky Saenz RN  Outcome: Ongoing  Goal: Control of acute pain  Description: Control of acute pain  1/6/2022 0216 by Meryle Knight, RN  Outcome: Met This Shift  1/5/2022 1507 by Jaky Saenz RN  Outcome: Ongoing     Problem: Skin Integrity:  Goal: Will show no infection signs and symptoms  Description: Will show no infection signs and symptoms  1/6/2022 0216 by Meryle Knight, RN  Outcome: Met This Shift  1/5/2022 1507 by Jaky Saenz RN  Outcome: Ongoing  Goal: Absence of new skin breakdown  Description: Absence of new skin breakdown  1/6/2022 0216 by Meryle Knight, RN  Outcome: Met This Shift  Note: No new skin breakdown  1/5/2022 1507 by Jaky Saenz RN  Outcome: Ongoing

## 2022-01-06 NOTE — PROGRESS NOTES
FAMILY MEDICINE  - PROGRESS NOTE    Date:  1/6/2022  Maria Elena Pinedo  [id number]      Chief Complaint   Patient presents with    Back Pain         Interval History:  not changed much, she has no new complaints this am. Her pain has resolved. No significant events overnight. Specialists notes, labs & imaging reviewed. Psychiatry was consulted did not change her current medications. Her Father passed away last night.     Subjective  Constitutional: positive for fatigue and obesity  Musculoskeletal:positive for muscle weakness and myalgias  Neurological: positive for memory problems and tremors  Endocrine: positive for diabetic symptoms including neuropathy and hyperglycemia:    Objective:    /64   Pulse 77   Temp 98.1 °F (36.7 °C) (Oral)   Resp 17   Ht 5' 5\" (1.651 m)   Wt 255 lb 8.2 oz (115.9 kg)   SpO2 95%   BMI 42.52 kg/m²   General appearance - overweight  Mental status - drowsy  Eyes - not examined  Ears - bilateral TM's and external ear canals normal  Nose - normal and patent, no erythema, discharge or polyps  Mouth - mucous membranes moist, pharynx normal without lesions  Neck - supple, no significant adenopathy  Lymphatics - no palpable lymphadenopathy, no hepatosplenomegaly  Chest - clear to auscultation, no wheezes, rales or rhonchi, symmetric air entry  Heart - normal rate, regular rhythm, normal S1, S2, no murmurs, rubs, clicks or gallops  Abdomen - soft, nontender, nondistended, no masses or organomegaly  Breasts - not examined  Back exam - not examined  Neurological - neck supple without rigidity  Musculoskeletal - no joint tenderness, deformity or swelling  Extremities - peripheral pulses normal, no pedal edema, no clubbing or cyanosis  Skin - normal coloration and turgor, no rashes, no suspicious skin lesions noted    Data:   Medications:   Current Facility-Administered Medications   Medication Dose Route Frequency Provider Last Rate Last Admin    gabapentin (NEURONTIN) capsule 400 mg  400 mg Oral Nightly [place] DO   400 mg at 01/05/22 2040    epoetin raphael-epbx (RETACRIT) injection 3,000 Units  3,000 Units SubCUTAneous Once per day on Mon Wed Fri Hemant Thompson MD   3,000 Units at 01/03/22 2318    sodium bicarbonate tablet 650 mg  650 mg Oral BID Hemant Thompson MD   650 mg at 01/05/22 2038    carvedilol (COREG) tablet 3.125 mg  3.125 mg Oral BID WC Hemant Thompson MD   3.125 mg at 01/05/22 1639    anticoagulant sodium citrate 4 % injection 1.9 mL  1.9 mL IntraCATHeter PRN Hemant Thompson MD        anticoagulant sodium citrate 4 % injection 1.9 mL  1.9 mL IntraCATHeter PRN Hemant Thompson MD   1.9 mL at 01/03/22 1201    tiZANidine (ZANAFLEX) tablet 4 mg  4 mg Oral Q6H PRN Viv Cannon MD   4 mg at 12/30/21 0123    tiZANidine (ZANAFLEX) tablet 2 mg  2 mg Oral TID Viv Cannon MD   2 mg at 01/05/22 2042    lidocaine 4 % external patch 1 patch  1 patch TransDERmal Daily Nai Ulloa DO   1 patch at 01/05/22 1639    aspirin chewable tablet 81 mg  81 mg Oral Daily Reji Mccullough MD   81 mg at 01/05/22 1639    atorvastatin (LIPITOR) tablet 80 mg  80 mg Oral Daily Reji Mccullough MD   80 mg at 01/05/22 1639    busPIRone (BUSPAR) tablet 7.5 mg  7.5 mg Oral TID Reji Mccullough MD   7.5 mg at 01/05/22 2036    docusate sodium (COLACE) capsule 100 mg  100 mg Oral BID Reji Mccullough MD   100 mg at 01/05/22 2036    apixaban (ELIQUIS) tablet 5 mg  5 mg Oral BID Reji Mccullough MD   5 mg at 01/05/22 2040    febuxostat (ULORIC) tablet 40 mg  40 mg Oral Daily Reji Mccullough MD   40 mg at 01/05/22 1639    insulin glargine (LANTUS) injection vial 73 Units  73 Units SubCUTAneous BID Reji Mccullough MD   73 Units at 01/05/22 2024    pantoprazole (PROTONIX) tablet 40 mg  40 mg Oral [facility] Reji Mccullough MD   40 mg at 01/04/22 0800    polyethylene glycol (GLYCOLAX) packet 17 g  17 g Oral Daily Ruth Aguirre MD   17 g at 01/05/22 1645    ranolazine (RANEXA) extended release tablet 1,000 mg  1,000 mg Oral BID Ruth Aguirre MD   1,000 mg at 01/05/22 2038    senna (SENOKOT) tablet 17.2 mg  2 tablet Oral Daily PRN Ruth Aguirre MD        [facility] capsule 0.4 mg  0.4 mg Oral Daily Ruth Aguirre MD   0.4 mg at 01/05/22 1639    traZODone (DESYREL) tablet 75 mg  75 mg Oral Nightly Ruth Aguirre MD   75 mg at 01/05/22 2038    glucose (GLUTOSE) 40 % oral gel 15 g  15 g Oral PRN Ruth Aguirre MD        dextrose 50 % IV solution  12.5 g IntraVENous PRN Ruth Aguirre MD        glucagon (rDNA) injection 1 mg  1 mg IntraMUSCular PRN Ruth Aguirre MD        dextrose 5 % solution  100 mL/hr IntraVENous PRN Ruth Aguirre MD        sodium chloride flush 0.9 % injection 5-40 mL  5-40 mL IntraVENous 2 times per day Ruth Aguirre MD   10 mL at 01/01/22 2218    sodium chloride flush 0.9 % injection 5-40 mL  5-40 mL IntraVENous PRN Ruth Aguirre MD        0.9 % sodium chloride infusion  25 mL IntraVENous PRN Ruth Aguirre MD        ondansetron (ZOFRAN-ODT) disintegrating tablet 4 mg  4 mg Oral Q8H PRN Ruth Aguirre MD        Or    ondansetron [facility]) injection 4 mg  4 mg IntraVENous Q6H PRN Ruth Aguirre MD        polyethylene glycol [facility]) packet 17 g  17 g Oral Daily PRN Ruth Aguirre MD        acetaminophen (TYLENOL) tablet 650 mg  650 mg Oral Q6H PRN Ruth Aguirre MD   650 mg at 01/03/22 2114    Or    acetaminophen (TYLENOL) suppository 650 mg  650 mg Rectal Q6H PRN Ruth Aguirre MD        insulin lispro (HUMALOG) injection vial 0-18 Units  0-18 Units SubCUTAneous TID WC Ruth Aguirre MD   3 Units at 01/05/22 1640    insulin lispro (HUMALOG) injection vial 0-9 Units  0-9 Units SubCUTAneous Nightly Ruth Aguirre MD 3 Units at 01/05/22 2026    HYDROcodone-acetaminophen (NORCO) 5-325 MG per tablet 1 tablet  1 tablet Oral Q6H NADIR Nagy MD   1 tablet at 01/05/22 2330     No intake or output data in the 24 hours ending 01/06/22 0605  Recent Results (from the past 24 hour(s))   POC Glucose Fingerstick    Collection Time: 01/05/22  6:09 AM   Result Value Ref Range    POC Glucose 50 (L) 65 - 105 mg/dL   Basic Metabolic Panel w/ Reflex to MG    Collection Time: 01/05/22  9:21 AM   Result Value Ref Range    Glucose 154 (H) 70 - 99 mg/dL    BUN 41 (H) 8 - 23 mg/dL    CREATININE 4.09 (H) 0.50 - 0.90 mg/dL    Bun/Cre Ratio NOT REPORTED 9 - 20    Calcium 8.9 8.6 - 10.4 mg/dL    Sodium 132 (L) 135 - 144 mmol/L    Potassium 5.2 3.7 - 5.3 mmol/L    Chloride 94 (L) 98 - 107 mmol/L    CO2 23 20 - 31 mmol/L    Anion Gap 15 9 - 17 mmol/L    GFR Non-African American 11 (L) >60 mL/min    GFR  13 (L) >60 mL/min    GFR Comment          GFR Staging NOT REPORTED    CBC    Collection Time: 01/05/22 12:07 PM   Result Value Ref Range    WBC 5.7 3.5 - 11.0 k/uL    RBC 2.79 (L) 4.0 - 5.2 m/uL    Hemoglobin 9.4 (L) 12.0 - 16.0 g/dL    Hematocrit 26.7 (L) 36 - 46 %    MCV 95.8 80 - 100 fL    MCH 33.9 26 - 34 pg    MCHC 35.4 31 - 37 g/dL    RDW 14.5 11.5 - 14.9 %    Platelets 234 060 - 264 k/uL    MPV 8.0 6.0 - 12.0 fL    NRBC Automated NOT REPORTED per 100 WBC     -----------------------------------------------------------------  RAD:  EKG:  Micro:     Assessment & Plan:    Patient Active Problem List:     Atherosclerosis of coronary artery bypass graft of native heart without angina pectoris     Acute renal failure (HCC)     Diabetes mellitus due to underlying condition with diabetic nephropathy, with long-term current use of insulin (HCC)     Chronic diastolic heart failure (HCC)     Diabetic polyneuropathy associated with type 2 diabetes mellitus (HCC)     History of coronary artery bypass graft     Iron deficiency anemia Spinal stenosis of lumbar region with neurogenic claudication     Mixed hyperlipidemia     Stage 4 chronic kidney disease (HCC)     Type 2 diabetes mellitus with kidney complication, with long-term current use of insulin (HCC)     Syncope and collapse     Obesity, Class II, BMI 35-39.9     Thyroid nodule greater than or equal to 1 cm in diameter incidentally noted on imaging study     Essential hypertension     Anemia in stage 4 chronic kidney disease (HCC)     Chronic ischemic heart disease     Ischemic stroke of frontal lobe ([facility])     Stroke-like symptoms     Morbid obesity with BMI of 40.0-44.9, adult ([facility])     Acute encephalopathy     Disequilibrium syndrome     Generalized weakness     Long-term memory loss     Muscle right arm weakness     Anxiety     Chronic midline low back pain with bilateral sciatica     Need for immunization against influenza     Altered mental status     Acute right-sided thoracic back pain     Tremor     Metabolic encephalopathy           Plan:  -Metabolic encephalopathy - mental status back at baseline. -ESRD on dialysis - Nephrology managing. -DM2 - stable.  -Emotional lability - Psychiatry input appreciated, normal grief reaction. -D/C planning ongoing - pre-cert with new insurance to ARU denied and peer to peer upheld the denial, looking at [facility].  -Continue current treatments.  -Complete orders per chart.     See orders   Disposition:    Electronically signed by Taj Peace MD on 1/6/2022 at 6:05 AM

## 2022-01-06 NOTE — PROGRESS NOTES
[facility]: [facility]   INPATIENT OCCUPATIONAL THERAPY  PROGRESS NOTE  Date: 2022  Patient Name: Miguel Cadena      Room: 2506/2868-48  MRN: [id number]    : 1960  (62 y.o.) Gender: female     Discharge Recommendations:  Further Occupational Therapy is recommended upon facility discharge. Equipment Needed: No    Referring Practitioner: Deisi Boogie MD  Diagnosis: Acute right-sided thoracic back pain  General  Chart Reviewed: Yes  Patient assessed for rehabilitation services?: Yes  Additional Pertinent Hx: 59 y.o. female with past medical history significant for end-stage renal disease on dialysis, CAD, diabetes mellitus via EMS with reports of back pain. Patient is alert although does not seem to be answering questions appropriately, history is unreliable with multiple stories being reported by patient. Patient reports back pain right-sided with no injury initially and then reports that she was picking something up and did have pain of the right side which happened a week ago. Patient does state that she has had prior incidence of back spasms and back pain. Response to previous treatment: Patient with no complaints from previous session  Family / Caregiver Present: No  Referring Practitioner: Deisi Boogie MD  Diagnosis: Acute right-sided thoracic back pain    Restrictions  Restrictions/Precautions: General Precautions,Fall Risk  Required Braces or Orthoses?: No      Subjective  Subjective: pt states that her father passed away yesterday and becomes emotional. emotional support given  Comments: pt alert and motivated.  co-tx debi GONCALVES PTA  Patient Currently in Pain: No  Overall Orientation Status: Within Functional Limits  Patient Observation  Observations: O2 desats to 80-81% during functional mobility on 1L of O2, O2 increased to 2L and O2 sats increased to >90% afte 1 minute Madison Trejo RN notified        Objective  Cognition  Overall Cognitive Status: WFL  Bed mobility  Rolling to Right: Modified independent  Supine to Sit: Modified independent  Sit to Supine: Unable to assess (pt up in chair at end of tx)  Scooting: Modified independent  Comment: utilizes rails  Balance  Sitting Balance: Stand by assistance (tolerates sitting EOB unsupported x 35 minutes during functional tasks)  Standing Balance: Contact guard assistance  Standing Balance  Time: 3-4 minutes x 2, ~2 minutes x 2 c 1-2 UE support on RW  Activity: ADLs, functional mobility  Comment: multiple standing RB taken 2* SOB and O2 desats to 80-81%, no LOB noted  Functional Mobility  Functional - Mobility Device: Rolling Walker  Activity:  (room/hallway mobility)  Assist Level: Contact guard assistance  Functional Mobility Comments: multiple standing RB taken 2* SOB and O2 desats to 80-81%, O2 increased to 2L and O2 sats increased to >90% afte 1 minute Iman Bryant RN notified, no LOB noted   ADL  Feeding: Setup; Adaptive utensils (comment)  Grooming: Setup  UE Bathing: Setup  LE Bathing: Moderate assistance;Setup (TA for buttocks and foot care )  UE Dressing: Setup; Increased time to complete  LE Dressing: Moderate assistance; Increased time to complete;Setup (TA for socks, max A for shoes, CGA in stand to donnshortsove)  Toileting: Moderate assistance  Additional Comments: pt completes bathing/dressing sitting EOB, pt req CGA in stand to maria de jesus underwear/shorts over hips and buttocks hygiene. extended time required 2* fatigue and O2 desats to low 80% with moderate exertion, Pt currrently limited due to decreased strength, balance, activity, and increased pain impacting safety and independence with self care tasks. pt states that she has walk-in shower with chair at home and has AE needed for LB dressing.  utilizes [facility] this date     Transfers  Sit to stand: Contact guard assistance  Stand to sit: Contact guard assistance  Transfer Comments: VC for hand placement and safe RW positioning Assessment  Performance deficits / Impairments: Decreased functional mobility ; Decreased ADL status; Decreased strength;Decreased safe awareness;Decreased endurance;Decreased sensation;Decreased balance  Prognosis: Good  Discharge Recommendations: Continue to assess pending progress  Activity Tolerance: Patient Tolerated treatment well  Safety Devices in place: Yes  Type of devices: Call light within reach; All fall risk precautions in place;Nurse notified; Left in chair  Equipment Recommendations  Equipment Needed: No          Patient Education: OT POC, EC during ADLs, home safety     Learner:patient  Method: demonstration and explanation       Outcome: acknowledged understanding and demonstrated understanding     Plan  Safety Devices  Safety Devices in place: Yes  Type of devices: Call light within reach,All fall risk precautions in place,Nurse notified,Left in chair  Plan  Times per week: 4-5  Times per day: Daily  Current Treatment Recommendations: Strengthening,ROM,Balance Training,Functional Mobility Jose Alfredo Ren Education & Training,Patient/Caregiver Education & Training,Equipment Evaluation, Education, & procurement,Self-Care / ADL      Goals  Short term goals  Time Frame for Short term goals: By discharge  Short term goal 1: Patient will perform BADLs with mod I and good safety  Short term goal 2: Patient will tolerate standing 4+ minutes, with no LOB, mod I and good safety  Short term goal 3: Patient will perform transfers/functional mobility with mod I   Short term goal 4: Patient will V/D at least 3 EC/WS/fall prevention strategies to promote safety with daily routine  Short term goal 5: Patient will actively participate in 15+ minutes of therapeutic activity to promote independence with self care and mobility    OT Individual Minutes  Time In: 2509  Time Out: 1028  Minutes: 70      Electronically signed by SYBIL Jones on 1/6/22 at 12:04 PM EST

## 2022-01-06 NOTE — FLOWSHEET NOTE
Patient's father  last night; patient was sitting in chair writing his obituary. She talked about the circumstances of his death. Her family is close and supportive. We also discussed patient's d/c to [facility] of [address] [place] [address] tomorrow. Patient appreciated listening presence, emotional support, and prayer. 22 1652   Encounter Summary   Services provided to: Patient   Referral/Consult From: Other    Support System Parent; Family members   Continue Visiting   (22)   Complexity of Encounter High   Length of Encounter 30 minutes   Spiritual/Anabaptism   Type Spiritual support   Assessment Calm;Grieving   Intervention Active listening;Explored feelings, thoughts, concerns;Explored coping resources;Prayer;Sustaining presence/ Ministry of presence;Grief care; Discussed illness/injury and it's impact   Outcome Expressed gratitude;Comfort;Engaged in conversation;Expressed feelings/needs/concerns;Receptive; Expressed regrets

## 2022-01-06 NOTE — PROGRESS NOTES
BONNIE informed by Lisa Sorenson with [facility] that P2P was scheduled for today until 1pm and tomorrow from 9-2. Jennifer also reported that [facility] is upholding the initial denial and pt can appeal or go to SNF. BONNIE spoke with pt and family about possible SNF. Pt is agreeable to a SNF, however pt's sister, Gilda Nieves, would like to discuss possibly appealing the decision with a personal friend before making a SNF decision. BONNIE provided a list of SNF per family request and will hear from family about their decision     Pt family has decided to go with a SNF. Pt and family would like [facility] [place] [address] [place] [address].  SW faxed referral.

## 2022-01-07 LAB
-: NORMAL
ANION GAP SERPL CALCULATED.3IONS-SCNC: 11 MMOL/L (ref 9–17)
BUN BLDV-MCNC: 32 MG/DL (ref 8–23)
BUN/CREAT BLD: ABNORMAL (ref 9–20)
CALCIUM SERPL-MCNC: 8.9 MG/DL (ref 8.6–10.4)
CHLORIDE BLD-SCNC: 98 MMOL/L (ref 98–107)
CO2: 26 MMOL/L (ref 20–31)
CREAT SERPL-MCNC: 2.68 MG/DL (ref 0.5–0.9)
GFR AFRICAN AMERICAN: 22 ML/MIN
GFR NON-AFRICAN AMERICAN: 18 ML/MIN
GFR SERPL CREATININE-BSD FRML MDRD: ABNORMAL ML/MIN/{1.73_M2}
GFR SERPL CREATININE-BSD FRML MDRD: ABNORMAL ML/MIN/{1.73_M2}
GLUCOSE BLD-MCNC: 148 MG/DL (ref 65–105)
GLUCOSE BLD-MCNC: 214 MG/DL (ref 65–105)
GLUCOSE BLD-MCNC: 234 MG/DL (ref 65–105)
GLUCOSE BLD-MCNC: 245 MG/DL (ref 70–99)
POTASSIUM SERPL-SCNC: 4.2 MMOL/L (ref 3.7–5.3)
REASON FOR REJECTION: NORMAL
SODIUM BLD-SCNC: 135 MMOL/L (ref 135–144)
ZZ NTE CLEAN UP: ORDERED TEST: NORMAL
ZZ NTE WITH NAME CLEAN UP: SPECIMEN SOURCE: NORMAL

## 2022-01-07 PROCEDURE — 97110 THERAPEUTIC EXERCISES: CPT

## 2022-01-07 PROCEDURE — 80048 BASIC METABOLIC PNL TOTAL CA: CPT

## 2022-01-07 PROCEDURE — 2500000003 HC RX 250 WO HCPCS: Performed by: INTERNAL MEDICINE

## 2022-01-07 PROCEDURE — 6370000000 HC RX 637 (ALT 250 FOR IP): Performed by: INTERNAL MEDICINE

## 2022-01-07 PROCEDURE — 82947 ASSAY GLUCOSE BLOOD QUANT: CPT

## 2022-01-07 PROCEDURE — G0378 HOSPITAL OBSERVATION PER HR: HCPCS

## 2022-01-07 PROCEDURE — 6370000000 HC RX 637 (ALT 250 FOR IP): Performed by: PSYCHIATRY & NEUROLOGY

## 2022-01-07 PROCEDURE — 96372 THER/PROPH/DIAG INJ SC/IM: CPT

## 2022-01-07 PROCEDURE — 6370000000 HC RX 637 (ALT 250 FOR IP): Performed by: STUDENT IN AN ORGANIZED HEALTH CARE EDUCATION/TRAINING PROGRAM

## 2022-01-07 PROCEDURE — 90935 HEMODIALYSIS ONE EVALUATION: CPT

## 2022-01-07 PROCEDURE — 99225 PR SBSQ OBSERVATION CARE/DAY 25 MINUTES: CPT | Performed by: FAMILY MEDICINE

## 2022-01-07 PROCEDURE — 97530 THERAPEUTIC ACTIVITIES: CPT

## 2022-01-07 PROCEDURE — 6360000002 HC RX W HCPCS: Performed by: INTERNAL MEDICINE

## 2022-01-07 PROCEDURE — 6370000000 HC RX 637 (ALT 250 FOR IP): Performed by: FAMILY MEDICINE

## 2022-01-07 PROCEDURE — 97116 GAIT TRAINING THERAPY: CPT

## 2022-01-07 PROCEDURE — 36415 COLL VENOUS BLD VENIPUNCTURE: CPT

## 2022-01-07 RX ADMIN — SODIUM BICARBONATE 650 MG: 650 TABLET ORAL at 08:05

## 2022-01-07 RX ADMIN — INSULIN GLARGINE 73 UNITS: 100 INJECTION, SOLUTION SUBCUTANEOUS at 21:19

## 2022-01-07 RX ADMIN — GABAPENTIN 400 MG: 400 CAPSULE ORAL at 21:08

## 2022-01-07 RX ADMIN — INSULIN LISPRO 6 UNITS: 100 INJECTION, SOLUTION INTRAVENOUS; SUBCUTANEOUS at 18:08

## 2022-01-07 RX ADMIN — PANTOPRAZOLE SODIUM 40 MG: 40 TABLET, DELAYED RELEASE ORAL at 06:43

## 2022-01-07 RX ADMIN — TIZANIDINE 2 MG: 2 TABLET ORAL at 14:27

## 2022-01-07 RX ADMIN — ATORVASTATIN CALCIUM 80 MG: 80 TABLET, FILM COATED ORAL at 08:05

## 2022-01-07 RX ADMIN — HYDROCODONE BITARTRATE AND ACETAMINOPHEN 1 TABLET: 5; 325 TABLET ORAL at 08:05

## 2022-01-07 RX ADMIN — TRAZODONE HYDROCHLORIDE 75 MG: 50 TABLET ORAL at 21:07

## 2022-01-07 RX ADMIN — EPOETIN ALFA-EPBX 3000 UNITS: 3000 INJECTION, SOLUTION INTRAVENOUS; SUBCUTANEOUS at 14:28

## 2022-01-07 RX ADMIN — APIXABAN 5 MG: 5 TABLET, FILM COATED ORAL at 08:05

## 2022-01-07 RX ADMIN — TIZANIDINE 2 MG: 2 TABLET ORAL at 08:04

## 2022-01-07 RX ADMIN — Medication 1.9 ML: at 12:38

## 2022-01-07 RX ADMIN — BUSPIRONE HYDROCHLORIDE 7.5 MG: 5 TABLET ORAL at 21:07

## 2022-01-07 RX ADMIN — BUSPIRONE HYDROCHLORIDE 7.5 MG: 5 TABLET ORAL at 08:04

## 2022-01-07 RX ADMIN — SODIUM BICARBONATE 650 MG: 650 TABLET ORAL at 21:08

## 2022-01-07 RX ADMIN — FEBUXOSTAT 40 MG: 40 TABLET, FILM COATED ORAL at 21:21

## 2022-01-07 RX ADMIN — DOCUSATE SODIUM 100 MG: 100 CAPSULE ORAL at 21:06

## 2022-01-07 RX ADMIN — DOCUSATE SODIUM 100 MG: 100 CAPSULE ORAL at 08:06

## 2022-01-07 RX ADMIN — CARVEDILOL 3.12 MG: 3.12 TABLET, FILM COATED ORAL at 21:20

## 2022-01-07 RX ADMIN — TIZANIDINE 2 MG: 2 TABLET ORAL at 21:06

## 2022-01-07 RX ADMIN — APIXABAN 5 MG: 5 TABLET, FILM COATED ORAL at 21:08

## 2022-01-07 RX ADMIN — BUSPIRONE HYDROCHLORIDE 7.5 MG: 5 TABLET ORAL at 14:28

## 2022-01-07 RX ADMIN — RANOLAZINE 1000 MG: 500 TABLET, FILM COATED, EXTENDED RELEASE ORAL at 21:06

## 2022-01-07 RX ADMIN — TAMSULOSIN HYDROCHLORIDE 0.4 MG: 0.4 CAPSULE ORAL at 08:03

## 2022-01-07 RX ADMIN — ASPIRIN 81 MG: 81 TABLET, CHEWABLE ORAL at 08:05

## 2022-01-07 RX ADMIN — INSULIN GLARGINE 73 UNITS: 100 INJECTION, SOLUTION SUBCUTANEOUS at 08:08

## 2022-01-07 RX ADMIN — HYDROCODONE BITARTRATE AND ACETAMINOPHEN 1 TABLET: 5; 325 TABLET ORAL at 21:57

## 2022-01-07 RX ADMIN — RANOLAZINE 1000 MG: 500 TABLET, FILM COATED, EXTENDED RELEASE ORAL at 08:04

## 2022-01-07 RX ADMIN — INSULIN LISPRO 5 UNITS: 100 INJECTION, SOLUTION INTRAVENOUS; SUBCUTANEOUS at 21:18

## 2022-01-07 ASSESSMENT — PAIN SCALES - GENERAL
PAINLEVEL_OUTOF10: 6
PAINLEVEL_OUTOF10: 8

## 2022-01-07 NOTE — PLAN OF CARE
Problem: Falls - Risk of:  Goal: Will remain free from falls  Description: Will remain free from falls  Outcome: Met This Shift  Note: Pt has remained free from falls, call light is within reach and pt is able to make needs known  Goal: Absence of physical injury  Description: Absence of physical injury  Outcome: Met This Shift     Problem: Pain:  Goal: Pain level will decrease  Description: Pain level will decrease  Outcome: Met This Shift  Goal: Control of acute pain  Description: Control of acute pain  Outcome: Met This Shift     Problem: Skin Integrity:  Goal: Will show no infection signs and symptoms  Description: Will show no infection signs and symptoms  Outcome: Met This Shift  Goal: Absence of new skin breakdown  Description: Absence of new skin breakdown  Outcome: Met This Shift

## 2022-01-07 NOTE — PROGRESS NOTES
[address]   OCCUPATIONAL THERAPY MISSED TREATMENT NOTE   INPATIENT   Date: 22  Patient Name: Carlos Bermudez       Room:   MRN: [id number]   Account #: [de-identified]    : 1960  (59 y.o.)  Gender: female   Referring Practitioner: Janell Alberto MD  Diagnosis: Acute right-sided thoracic back pain             REASON FOR MISSED TREATMENT:  Patient at testing and/or off the floor   -   Hemodialysis       [place] SYBIL Johnson

## 2022-01-07 NOTE — PROGRESS NOTES
HEMODIALYSIS POST TREATMENT NOTE    Treatment time ordered: 3Hrs    Actual treatment time: 3Hrs    UltraFiltration Goal: 2-3Kgs  UltraFiltration Removed: 3Kgs      Pre Treatment weight: 115.6Kgs  Post Treatment weight: 112.6Kgs  Estimated Dry Weight: 111Kgs    Access used:     Central Venous Catheter:          Tunneled or Non-tunneled: Tunneled           Site: R chest          Access Flow: good      Internal Access:       AV Fistula or AV Graft: AVF         Site: L Lower arm       Access Flow: AVF not yet mature and was not used       Sign and symptoms of infection: No       If YES: N/A    Medications or blood products given: N/A    Chronic outpatient schedule: [facility]    Chronic outpatient unit: [facility]    Summary of response to treatment: Tolerated fairly well with no issues throughout trx, 3kgs removal    Explain if orders NOT met, was physician notified:N/A      ACES flowsheet faxed to patient unit/ placed in patient chart: yes    Post assessment completed: yes by Khushbu Lainez RN    Report given to: Coty Duenas documented Safety Checks include the followin) Access and face visible at all times. 2) All connections and blood lines are secure with no kinks. 3) NVL alarm engaged. 4) Hemosafe device applied (if applicable). 5) No collapse of Arterial or Venous blood chambers. 6) All blood lines / pump segments in the air detectors.

## 2022-01-07 NOTE — PROGRESS NOTES
FAMILY MEDICINE  - PROGRESS NOTE    Date:  1/7/2022  Carlos Bermudez  [id number]      Chief Complaint   Patient presents with    Back Pain         Interval History:  not changed much, she has no new complaints this am. Her pain has resolved. No significant events overnight. Specialists notes, labs & imaging reviewed. She is supposed to be discharged today to a SNF.       Subjective  Constitutional: positive for fatigue and obesity  Musculoskeletal:positive for muscle weakness and myalgias  Neurological: positive for memory problems and tremors  Endocrine: positive for diabetic symptoms including neuropathy and hyperglycemia:    Objective:    /68   Pulse 68   Temp 98.2 °F (36.8 °C)   Resp 16   Ht 5' 5\" (1.651 m)   Wt 248 lb 3.8 oz (112.6 kg)   SpO2 98%   BMI 41.31 kg/m²   General appearance - overweight  Mental status - drowsy  Eyes - not examined  Ears - bilateral TM's and external ear canals normal  Nose - normal and patent, no erythema, discharge or polyps  Mouth - mucous membranes moist, pharynx normal without lesions  Neck - supple, no significant adenopathy  Lymphatics - no palpable lymphadenopathy, no hepatosplenomegaly  Chest - clear to auscultation, no wheezes, rales or rhonchi, symmetric air entry  Heart - normal rate, regular rhythm, normal S1, S2, no murmurs, rubs, clicks or gallops  Abdomen - soft, nontender, nondistended, no masses or organomegaly  Breasts - not examined  Back exam - not examined  Neurological - neck supple without rigidity  Musculoskeletal - no joint tenderness, deformity or swelling  Extremities - peripheral pulses normal, no pedal edema, no clubbing or cyanosis  Skin - normal coloration and turgor, no rashes, no suspicious skin lesions noted    Data:   Medications:   Current Facility-Administered Medications   Medication Dose Route Frequency Provider Last Rate Last Admin    gabapentin (NEURONTIN) capsule 400 mg  400 mg Oral Nightly El Gray DO   400 mg at 01/06/22 2117    epoetin raphael-epbx (RETACRIT) injection 3,000 Units  3,000 Units SubCUTAneous Once per day on Mon Wed Fri Abena Quijano MD   3,000 Units at 01/07/22 1428    sodium bicarbonate tablet 650 mg  650 mg Oral BID Abena Quijano MD   650 mg at 01/07/22 0805    carvedilol (COREG) tablet 3.125 mg  3.125 mg Oral BID WC Abena Quijano MD   3.125 mg at 01/06/22 1740    anticoagulant sodium citrate 4 % injection 1.9 mL  1.9 mL IntraCATHeter PRN Abena Quijano MD   1.9 mL at 01/07/22 1238    anticoagulant sodium citrate 4 % injection 1.9 mL  1.9 mL IntraCATHeter PRN Abena Quijano MD   1.9 mL at 01/07/22 1238    tiZANidine (ZANAFLEX) tablet 4 mg  4 mg Oral Q6H PRN Mario Lamas MD   4 mg at 12/30/21 0123    tiZANidine (ZANAFLEX) tablet 2 mg  2 mg Oral TID Mario Lamas MD   2 mg at 01/07/22 1427    lidocaine 4 % external patch 1 patch  1 patch TransDERmal Daily Delia Price DO   1 patch at 01/07/22 7909    aspirin chewable tablet 81 mg  81 mg Oral Daily Saqib Granger MD   81 mg at 01/07/22 0805    atorvastatin (LIPITOR) tablet 80 mg  80 mg Oral Daily Saqib Granger MD   80 mg at 01/07/22 0805    busPIRone (BUSPAR) tablet 7.5 mg  7.5 mg Oral TID Saqib Granger MD   7.5 mg at 01/07/22 1428    docusate sodium (COLACE) capsule 100 mg  100 mg Oral BID Saqib Granger MD   100 mg at 01/07/22 4941    apixaban (ELIQUIS) tablet 5 mg  5 mg Oral BID Saqib Granger MD   5 mg at 01/07/22 0805    febuxostat (ULORIC) tablet 40 mg  40 mg Oral Daily Saqib Granger MD   40 mg at 01/06/22 1159    insulin glargine (LANTUS) injection vial 73 Units  73 Units SubCUTAneous BID Saqib Granger MD   73 Units at 01/07/22 0808    pantoprazole (PROTONIX) tablet 40 mg  40 mg Oral [facility] Saqib Granger MD   40 mg at 01/07/22 0643    polyethylene glycol (GLYCOLAX) packet 17 g  17 g Oral Daily Kyle Saini MD   17 g at 01/05/22 1645    ranolazine (RANEXA) extended release tablet 1,000 mg  1,000 mg Oral BID Kyle Saini MD   1,000 mg at 01/07/22 0804    senna (SENOKOT) tablet 17.2 mg  2 tablet Oral Daily PRN Kyle Saini MD        [facility]) capsule 0.4 mg  0.4 mg Oral Daily Kyle Saini MD   0.4 mg at 01/07/22 0803    traZODone (DESYREL) tablet 75 mg  75 mg Oral Nightly Kyle Saini MD   75 mg at 01/06/22 2116    glucose (GLUTOSE) 40 % oral gel 15 g  15 g Oral PRN Kyle Saini MD   15 g at 01/06/22 0635    dextrose 50 % IV solution  12.5 g IntraVENous PRN Kyle Saini MD        glucagon (rDNA) injection 1 mg  1 mg IntraMUSCular PRN Kyle Saini MD        dextrose 5 % solution  100 mL/hr IntraVENous PRN Kyle Saini MD        sodium chloride flush 0.9 % injection 5-40 mL  5-40 mL IntraVENous 2 times per day Kyle Saini MD   10 mL at 01/01/22 2218    sodium chloride flush 0.9 % injection 5-40 mL  5-40 mL IntraVENous PRN Kyle Saini MD        0.9 % sodium chloride infusion  25 mL IntraVENous PRN Kyle Saini MD        ondansetron (ZOFRAN-ODT) disintegrating tablet 4 mg  4 mg Oral Q8H PRN Kyle Saini MD        Or    ondansetron [facility]) injection 4 mg  4 mg IntraVENous Q6H PRN Kyle Saini MD        polyethylene glycol [facility]) packet 17 g  17 g Oral Daily PRN Kyle Saini MD        acetaminophen (TYLENOL) tablet 650 mg  650 mg Oral Q6H PRN Kyle Saini MD   650 mg at 01/03/22 2114    Or    acetaminophen (TYLENOL) suppository 650 mg  650 mg Rectal Q6H PRN Kyle Saini MD        insulin lispro (HUMALOG) injection vial 0-18 Units  0-18 Units SubCUTAneous TID WC Kyle Saini MD   6 Units at 01/06/22 1735    insulin lispro (HUMALOG) injection vial 0-9 Units  0-9 Units SubCUTAneous Nightly Kyle Saini MD   3 Units at 01/06/22 2123    HYDROcodone-acetaminophen (NORCO) 5-325 MG per tablet 1 tablet  1 tablet Oral Q6H NADIR Jacobson MD   1 tablet at 01/07/22 0805       Intake/Output Summary (Last 24 hours) at 1/7/2022 1718  Last data filed at 1/7/2022 1343  Gross per 24 hour   Intake 825 ml   Output --   Net 825 ml     Recent Results (from the past 24 hour(s))   POC Glucose Fingerstick    Collection Time: 01/06/22  8:04 PM   Result Value Ref Range    POC Glucose 214 (H) 65 - 105 mg/dL   POC Glucose Fingerstick    Collection Time: 01/07/22  6:17 AM   Result Value Ref Range    POC Glucose 148 (H) 65 - 105 mg/dL   SPECIMEN REJECTION    Collection Time: 01/07/22  8:07 AM   Result Value Ref Range    Specimen Source . BLOOD     Ordered Test BMPX     Reason for Rejection Unable to perform testing: Specimen hemolyzed.      - NOT REPORTED    Basic Metabolic Panel w/ Reflex to MG    Collection Time: 01/07/22 10:11 AM   Result Value Ref Range    Glucose 245 (H) 70 - 99 mg/dL    BUN 32 (H) 8 - 23 mg/dL    CREATININE 2.68 (H) 0.50 - 0.90 mg/dL    Bun/Cre Ratio NOT REPORTED 9 - 20    Calcium 8.9 8.6 - 10.4 mg/dL    Sodium 135 135 - 144 mmol/L    Potassium 4.2 3.7 - 5.3 mmol/L    Chloride 98 98 - 107 mmol/L    CO2 26 20 - 31 mmol/L    Anion Gap 11 9 - 17 mmol/L    GFR Non-African American 18 (L) >60 mL/min    GFR  22 (L) >60 mL/min    GFR Comment          GFR Staging NOT REPORTED      -----------------------------------------------------------------  RAD:  EKG:  Micro:     Assessment & Plan:    Patient Active Problem List:     Atherosclerosis of coronary artery bypass graft of native heart without angina pectoris     Acute renal failure (HCC)     Diabetes mellitus due to underlying condition with diabetic nephropathy, with long-term current use of insulin (HCC)     Chronic diastolic heart failure (HCC)     Diabetic polyneuropathy associated with type 2 diabetes mellitus (HCC)     History of coronary artery bypass graft     Iron deficiency anemia     Spinal stenosis of lumbar region with neurogenic claudication     Mixed hyperlipidemia     Stage 4 chronic kidney disease (HCC)     Type 2 diabetes mellitus with kidney complication, with long-term current use of insulin (HCC)     Syncope and collapse     Obesity, Class II, BMI 35-39.9     Thyroid nodule greater than or equal to 1 cm in diameter incidentally noted on imaging study     Essential hypertension     Anemia in stage 4 chronic kidney disease (HCC)     Chronic ischemic heart disease     Ischemic stroke of frontal lobe ([facility])     Stroke-like symptoms     Morbid obesity with BMI of 40.0-44.9, adult ([facility])     Acute encephalopathy     Disequilibrium syndrome     Generalized weakness     Long-term memory loss     Muscle right arm weakness     Anxiety     Chronic midline low back pain with bilateral sciatica     Need for immunization against influenza     Altered mental status     Acute right-sided thoracic back pain     Tremor     Metabolic encephalopathy           Plan:  -Metabolic encephalopathy - mental status back at baseline. -ESRD on dialysis - Nephrology managing. -DM2 - stable.  -Emotional lability - Psychiatry input appreciated, normal grief reaction. -D/C planning ongoing - awaiting pre-cert to [address] [place] [address], hopefully it will be obtained tomorrow.  -Continue current treatments.  -Complete orders per chart.     See orders   Disposition:    Electronically signed by Lela Dailey MD on 1/7/2022 at 5:18 PM

## 2022-01-07 NOTE — PROGRESS NOTES
SW informed by Jeyson El at [facility] that pt has not received authorization from her insurance yet. Transport is on will-[facility]. SW updated pt and family.

## 2022-01-07 NOTE — PROGRESS NOTES
WBCUA 5 TO 10 11/14/2021    RBCUA 0 TO 2 11/14/2021    MUCUS NOT REPORTED 11/14/2021    TRICHOMONAS NOT REPORTED 11/14/2021    YEAST FEW 11/14/2021    BACTERIA MANY 11/14/2021    SPECGRAV 1.014 11/14/2021    LEUKOCYTESUR TRACE 11/14/2021    UROBILINOGEN Normal 11/14/2021    BILIRUBINUR NEGATIVE 11/14/2021    GLUCOSEU 3+ 11/14/2021    KETUA NEGATIVE 11/14/2021    AMORPHOUS NOT REPORTED 11/14/2021     Urine Sodium:     Lab Results   Component Value Date    JASON 47 11/14/2021     Urine Chloride:    Lab Results   Component Value Date    CLUR 26 11/14/2021     Urine Protein:     Lab Results   Component Value Date    TPU 20 11/12/2021     Urine Creatinine:     Lab Results   Component Value Date    LABCREA 72.0 11/14/2021     IMPRESSION/RECOMMENDATIONS:      1.  End-stage renal disease - we will maintain MWF hemodialysis schedule. Vascular access is IJ tunneled hemodialysis catheter. Status post left arm AV fistula placement November 2021 which is maturing  Renal diet,i.e 2-gram sodium,2-gram potassium,1200 ml fluid restriction,1-gram phosphorus, 1800 KCal and 1.2 gram protein per day. 2.  Altered mental status -improved   Mental status is back to baseline. 3.   Normocytic anemia of chronic kidney disease -Retacrit 3 times weekly    4. Systemic hypertension - blood pressure control is adequate. 5.  Mild hyperkalemia-2 g potassium diet    6. Hyponatremia  Plan  Continue hemodialysis Monday Wednesday Friday  1200 mils fluid restriction    No objections to discharge date to outpatient rehab      Prognosis is guarded.     Jeancarlos Nunn MD   Attending Nephrologist  1/7/2022 12:49 PM

## 2022-01-07 NOTE — PROGRESS NOTES
[address]   Physical Therapy Progress Note    Date: 22  Patient Name: Emelina Painting       Room: -  MRN: [id number]   Account: [de-identified]   : 1960  (59 y.o.)   Gender: female     Discharge Recommendations   Home with assist PRN  Equipment Needed: No    Referring Practitioner: Saqib Granger MD  Diagnosis: Acute right-sided thoracic back pain  Restrictions/Precautions: General Precautions,Fall Risk   Past Medical History:  has a past medical history of Backache, unspecified, CHF (congestive heart failure) ([address] Chronic kidney disease, Coronary atherosclerosis of artery bypass graft, Cramp of limb, Gallstones, Hypertension, Insomnia, Pneumonia, Type II or unspecified type diabetes mellitus with renal manifestations, not stated as uncontrolled(250.40), Type II or unspecified type diabetes mellitus without mention of complication, not stated as uncontrolled, and Unspecified vitamin D deficiency. Past Surgical History:   has a past surgical history that includes Coronary artery bypass graft; Knee arthroscopy; Carpal tunnel release; Breast surgery; Tonsillectomy; Hand surgery; Ankle fracture surgery; Cholecystectomy, open (N/A); IR TUNNELED CVC PLACE WO SQ PORT/PUMP > 5 YEARS (2021); AV fistula creation (2021); and Dialysis fistula creation (Left, 2021). Overall Orientation Status: Within Functional Limits  Restrictions/Precautions  Restrictions/Precautions: General Precautions; Fall Risk  Required Braces or Orthoses?: No    Subjective: Pt reports hoping to go to rehab sometime today  Comments: Pt is pleasant and very cooperative with PT. Pt became emotional during therapy and reports pt's father passed away 3 days ago. Pt very emotional and reports family is working on making the arrangements at this time.     Vital Signs  Patient Currently in Pain: No                     Bed Mobility  Rolling: Unable to assess  Supine to Sit: Unable to assess  Sit to Supine: Unable to assess  Scooting: Unable to assess  Comment: pt in recliner upon arrival and exit      Transfers:  Sit to Stand: Contact guard assistance  Stand to sit: Contact guard assistance  Bed to Chair: Contact guard assistance              Ambulation 1  Surface: level tile  Device: Rolling Walker  Other Apparatus: O2 (1 L)  Assistance: Stand by assistance  Quality of Gait: Steady, no LOB. No buckling of knees this date. Pt requires standing and seated rest break. Gait Deviations: Increased ELISABET; Decreased step length;Decreased step height  Distance: 50 ft x 2  Comments: seated long rest break, Saturation dropped to 94% on 1L        Stairs/Curb  Stairs?: No                                                     Posture: Fair  Sitting - Static: Good;-  Sitting - Dynamic: Fair  Standing - Static: Good;-  Standing - Dynamic: Fair;+  Comments: Standing with RW, pt easily fatigued. Other exercises?: Yes  Other exercises 1: STS x3 with RW, multiple rocking attempts to stand of first attempt however pt requires multiple attempts  Other exercises 2: Education on pursed lip breathing, breathing with exertion  Other Activities  Comment: Fatigues quickly, slow pace with long rest breaks        Activity Tolerance: Patient limited by fatigue;Patient limited by endurance  Activity Tolerance: Frequent rest breaks PRN (pt needing frequent break due to tears from father recently passing away)  PT Equipment Recommendations  Equipment Needed: No       Assessment  Activity Tolerance: Patient limited by fatigue;Patient limited by endurance   Body structures, Functions, Activity limitations: Decreased functional mobility ; Decreased strength; Increased pain  Assessment: Impaired mobility due to pain and safety issues of decreased balance and strength  Discharge Recommendations: Home with assist PRN     Type of devices: All fall risk precautions in place;Call light within reach;Gait belt;Nurse notified; Left in chair Plan  Times per week: 5-6x/wk  Current Treatment Recommendations: [place] Mobility Training,Transfer Training,Gait Training    Patient Education  New Education Provided:  Pursed lip breathing  Learner:patient  Method: demonstration and explanation       Outcome: needs reinforcement     Goals  Short term goals  Time Frame for Short term goals: 2-3 days  Short term goal 1: mod-I bed mobility  Short term goal 2: mod-I transfers  Short term goal 3: mod-I gait with RW x 50-100ft    PT Individual Minutes  Time In: 0606  Time Out: 3274  Minutes: 54    Electronically signed by [place] PTA on 1/7/22 at 4:18 PM EST

## 2022-01-07 NOTE — PROGRESS NOTES
BONNIE unable to schedule transport for pt due to uncertainty that authorization will return tomorrow. Fili Eli from [facility] stated that Vernell Montesinos can come any time during the weekend. BONNIE completed the HENS. ANTHONY has been completed and printed and placed in blue folder and set at unit clerk station. Fili Eli will contact unit if authorization returns over the weekend. Transport will need to be scheduled with [facility], pt requested wheelchair. Pt and family have been updated by this BONNIE and Suzanne at [email or web address].

## 2022-01-08 PROCEDURE — 6370000000 HC RX 637 (ALT 250 FOR IP): Performed by: INTERNAL MEDICINE

## 2022-01-08 PROCEDURE — G0378 HOSPITAL OBSERVATION PER HR: HCPCS

## 2022-01-08 PROCEDURE — 97535 SELF CARE MNGMENT TRAINING: CPT

## 2022-01-08 PROCEDURE — 6370000000 HC RX 637 (ALT 250 FOR IP): Performed by: PSYCHIATRY & NEUROLOGY

## 2022-01-08 PROCEDURE — 97116 GAIT TRAINING THERAPY: CPT

## 2022-01-08 PROCEDURE — 97530 THERAPEUTIC ACTIVITIES: CPT

## 2022-01-08 PROCEDURE — 6370000000 HC RX 637 (ALT 250 FOR IP): Performed by: FAMILY MEDICINE

## 2022-01-08 PROCEDURE — 6370000000 HC RX 637 (ALT 250 FOR IP): Performed by: STUDENT IN AN ORGANIZED HEALTH CARE EDUCATION/TRAINING PROGRAM

## 2022-01-08 RX ADMIN — APIXABAN 5 MG: 5 TABLET, FILM COATED ORAL at 07:52

## 2022-01-08 RX ADMIN — CARVEDILOL 3.12 MG: 3.12 TABLET, FILM COATED ORAL at 07:51

## 2022-01-08 RX ADMIN — BUSPIRONE HYDROCHLORIDE 7.5 MG: 5 TABLET ORAL at 16:24

## 2022-01-08 RX ADMIN — DOCUSATE SODIUM 100 MG: 100 CAPSULE ORAL at 07:52

## 2022-01-08 RX ADMIN — HYDROCODONE BITARTRATE AND ACETAMINOPHEN 1 TABLET: 5; 325 TABLET ORAL at 07:50

## 2022-01-08 RX ADMIN — INSULIN LISPRO 2 UNITS: 100 INJECTION, SOLUTION INTRAVENOUS; SUBCUTANEOUS at 23:36

## 2022-01-08 RX ADMIN — DOCUSATE SODIUM 100 MG: 100 CAPSULE ORAL at 22:14

## 2022-01-08 RX ADMIN — INSULIN GLARGINE 73 UNITS: 100 INJECTION, SOLUTION SUBCUTANEOUS at 07:53

## 2022-01-08 RX ADMIN — BUSPIRONE HYDROCHLORIDE 7.5 MG: 5 TABLET ORAL at 07:50

## 2022-01-08 RX ADMIN — TAMSULOSIN HYDROCHLORIDE 0.4 MG: 0.4 CAPSULE ORAL at 07:51

## 2022-01-08 RX ADMIN — INSULIN LISPRO 3 UNITS: 100 INJECTION, SOLUTION INTRAVENOUS; SUBCUTANEOUS at 16:36

## 2022-01-08 RX ADMIN — TIZANIDINE 2 MG: 2 TABLET ORAL at 07:52

## 2022-01-08 RX ADMIN — POLYETHYLENE GLYCOL 3350 17 G: 17 POWDER, FOR SOLUTION ORAL at 22:10

## 2022-01-08 RX ADMIN — TIZANIDINE 2 MG: 2 TABLET ORAL at 16:26

## 2022-01-08 RX ADMIN — SODIUM BICARBONATE 650 MG: 650 TABLET ORAL at 22:22

## 2022-01-08 RX ADMIN — TRAZODONE HYDROCHLORIDE 75 MG: 50 TABLET ORAL at 22:14

## 2022-01-08 RX ADMIN — RANOLAZINE 1000 MG: 500 TABLET, FILM COATED, EXTENDED RELEASE ORAL at 07:51

## 2022-01-08 RX ADMIN — PANTOPRAZOLE SODIUM 40 MG: 40 TABLET, DELAYED RELEASE ORAL at 05:53

## 2022-01-08 RX ADMIN — ATORVASTATIN CALCIUM 80 MG: 80 TABLET, FILM COATED ORAL at 07:53

## 2022-01-08 RX ADMIN — RANOLAZINE 1000 MG: 500 TABLET, FILM COATED, EXTENDED RELEASE ORAL at 22:14

## 2022-01-08 RX ADMIN — TIZANIDINE 2 MG: 2 TABLET ORAL at 22:14

## 2022-01-08 RX ADMIN — INSULIN GLARGINE 73 UNITS: 100 INJECTION, SOLUTION SUBCUTANEOUS at 23:35

## 2022-01-08 RX ADMIN — SODIUM BICARBONATE 650 MG: 650 TABLET ORAL at 07:51

## 2022-01-08 RX ADMIN — ONDANSETRON 4 MG: 4 TABLET, ORALLY DISINTEGRATING ORAL at 07:51

## 2022-01-08 RX ADMIN — BUSPIRONE HYDROCHLORIDE 7.5 MG: 5 TABLET ORAL at 22:14

## 2022-01-08 RX ADMIN — FEBUXOSTAT 40 MG: 40 TABLET, FILM COATED ORAL at 07:56

## 2022-01-08 RX ADMIN — CARVEDILOL 3.12 MG: 3.12 TABLET, FILM COATED ORAL at 16:26

## 2022-01-08 RX ADMIN — ASPIRIN 81 MG: 81 TABLET, CHEWABLE ORAL at 07:53

## 2022-01-08 RX ADMIN — GABAPENTIN 400 MG: 400 CAPSULE ORAL at 22:13

## 2022-01-08 RX ADMIN — APIXABAN 5 MG: 5 TABLET, FILM COATED ORAL at 22:13

## 2022-01-08 ASSESSMENT — PAIN SCALES - GENERAL
PAINLEVEL_OUTOF10: 0
PAINLEVEL_OUTOF10: 0
PAINLEVEL_OUTOF10: 6

## 2022-01-08 NOTE — PROGRESS NOTES
ranolazine  1,000 mg Oral BID    tamsulosin  0.4 mg Oral Daily    traZODone  75 mg Oral Nightly    sodium chloride flush  5-40 mL IntraVENous 2 times per day    insulin lispro  0-18 Units SubCUTAneous TID WC    insulin lispro  0-9 Units SubCUTAneous Nightly     Continuous Infusions:   dextrose      sodium chloride       PRN Meds:.anticoagulant sodium citrate, anticoagulant sodium citrate, tiZANidine, senna, glucose, dextrose, glucagon (rDNA), dextrose, sodium chloride flush, sodium chloride, ondansetron **OR** ondansetron, polyethylene glycol, acetaminophen **OR** acetaminophen, HYDROcodone 5 mg - acetaminophen    Physical Exam:    VITALS:  BP (!) 118/52   Pulse 74   Temp 98.1 °F (36.7 °C) (Oral)   Resp 18   Ht 5' 5\" (1.651 m)   Wt 261 lb 14.5 oz (118.8 kg)   SpO2 92%   BMI 43.58 kg/m²   24HR INTAKE/OUTPUT:      Intake/Output Summary (Last 24 hours) at 1/8/2022 1430  Last data filed at 1/8/2022 1230  Gross per 24 hour   Intake 905 ml   Output --   Net 905 ml       Constitutional: slowed mentation and Lethargic and difficult to arouse    Skin: Skin color, texture, turgor normal. No rashes or lesions    Head: Normocephalic, without obvious abnormality, atraumatic     Cardiovascular/Edema: regular rate and rhythm, S1, S2 normal, no murmur, click, rub or gallop    Respiratory: Lungs: clear to auscultation bilaterally    Abdomen: soft, non-tender; bowel sounds normal; no masses,  no organomegaly    Back: symmetric, no curvature. ROM normal. No CVA tenderness. Extremities: edema +    Neuro:  Stuporous and lethargic. CBC:   No results for input(s): WBC, HGB, PLT in the last 72 hours.   BMP:    Recent Labs     01/06/22  1256 01/07/22  1011   * 135   K 4.8 4.2   CL 93* 98   CO2 24 26   BUN 30* 32*   CREATININE 2.99* 2.68*   GLUCOSE 403* 245*     Lab Results   Component Value Date    NITRU NEGATIVE 11/14/2021    COLORU Yellow 11/14/2021    PHUR 5.0 11/14/2021    WBCUA 5 TO 10 11/14/2021    RBCUA 0 TO 2 11/14/2021    MUCUS NOT REPORTED 11/14/2021    TRICHOMONAS NOT REPORTED 11/14/2021    YEAST FEW 11/14/2021    BACTERIA MANY 11/14/2021    SPECGRAV 1.014 11/14/2021    LEUKOCYTESUR TRACE 11/14/2021    UROBILINOGEN Normal 11/14/2021    BILIRUBINUR NEGATIVE 11/14/2021    GLUCOSEU 3+ 11/14/2021    KETUA NEGATIVE 11/14/2021    AMORPHOUS NOT REPORTED 11/14/2021     Urine Sodium:     Lab Results   Component Value Date    JASON 47 11/14/2021     Urine Chloride:    Lab Results   Component Value Date    CLUR 26 11/14/2021     Urine Protein:     Lab Results   Component Value Date    TPU 20 11/12/2021     Urine Creatinine:     Lab Results   Component Value Date    LABCREA 72.0 11/14/2021     IMPRESSION/RECOMMENDATIONS:      1.  End-stage renal disease - we will maintain MWF hemodialysis schedule. Vascular access is IJ tunneled hemodialysis catheter. Status post left arm AV fistula placement November 2021 which is maturing  Renal diet,i.e 2-gram sodium,2-gram potassium,1200 ml fluid restriction,1-gram phosphorus, 1800 KCal and 1.2 gram protein per day. 2.  Altered mental status -improved   Mental status is back to baseline. 3.   Normocytic anemia of chronic kidney disease -Retacrit 3 times weekly    4. Systemic hypertension - blood pressure control is adequate. 5.  Hyponatremia-improved -1200 mils fluid restriction  Plan  Continue hemodialysis Monday Wednesday Friday    No objections to discharge date to outpatient rehab      Prognosis is guarded.     Roula Velasco MD   Attending Nephrologist  1/8/2022 2:30 PM

## 2022-01-08 NOTE — FLOWSHEET NOTE
01/08/22 1459   Encounter Summary   Services provided to: Patient   Referral/Consult From: Rounding   Complexity of Encounter Low   Length of Encounter 15 minutes   Spiritual/Taoism   Type Spiritual support   Assessment Sleeping   Intervention Prayer

## 2022-01-08 NOTE — PROGRESS NOTES
Physical Therapy  Facility/Department: [facility] PROGRESSIVE CARE  Daily Treatment Note  NAME: Kong Glynn  : 1960  MRN: [id number]    Date of Service: 2022    Discharge Recommendations:  Home with assist PRN   PT Equipment Recommendations  Equipment Needed: No    Assessment   Body structures, Functions, Activity limitations: Decreased functional mobility ; Decreased strength; Increased pain  Assessment: Impaired mobility due to pain and safety issues of decreased balance and strength  History: chronic back pain  Clinical Presentation: stable  Barriers to Learning: tearful  REQUIRES PT FOLLOW UP: Yes  Activity Tolerance  Activity Tolerance: Patient limited by fatigue;Patient limited by endurance  Activity Tolerance: Frequent rest breaks PRN     Patient Diagnosis(es): The encounter diagnosis was Acute right-sided thoracic back pain. has a past medical history of Backache, unspecified, CHF (congestive heart failure) ([facility] [address] Chronic kidney disease, Coronary atherosclerosis of artery bypass graft, Cramp of limb, Gallstones, Hypertension, Insomnia, Pneumonia, Type II or unspecified type diabetes mellitus with renal manifestations, not stated as uncontrolled(250.40), Type II or unspecified type diabetes mellitus without mention of complication, not stated as uncontrolled, and Unspecified vitamin D deficiency. has a past surgical history that includes Coronary artery bypass graft; Knee arthroscopy; Carpal tunnel release; Breast surgery; Tonsillectomy; Hand surgery; Ankle fracture surgery; Cholecystectomy, open (N/A); IR TUNNELED CVC PLACE WO SQ PORT/PUMP > 5 YEARS (2021); AV fistula creation (2021); and Dialysis fistula creation (Left, 2021).     Restrictions  Restrictions/Precautions  Restrictions/Precautions: General Precautions,Fall Risk  Required Braces or Orthoses?: No  Subjective   General  Chart Reviewed: Yes  Response To Previous Treatment: Patient with no complaints from previous session. Family / Caregiver Present: No  Subjective  Subjective: Patient in bed upon arrival. Pt reports needing to use restroom first and agreeable for therapy. General Comment  Comments: Pt is pleasant and very cooperative with PT. RN arielle's patient for PT. Pain Screening  Patient Currently in Pain: No  Vital Signs  Patient Currently in Pain: No       Orientation  Orientation  Overall Orientation Status: Within Functional Limits  Cognition      Objective   Bed mobility  Supine to Sit: Modified independent  Sit to Supine: Unable to assess (pt up in chair at end of tx)     Transfers  Sit to Stand: Contact guard assistance  Stand to sit: Stand by assistance  Bed to Chair: Contact guard assistance  Stand Pivot Transfers: Contact guard assistance  Comment: standing with RW. Ambulation  Ambulation?: Yes  Ambulation 1  Surface: level tile  Device: Rolling Walker  Other Apparatus: O2 (1 L)  Assistance: Contact guard assistance;Stand by assistance  Quality of Gait: Steady, no LOB. No buckling of knees this date. Gait Deviations: Increased ELISABET; Decreased step length;Decreased step height  Distance: 25 ft, 50 ft x 2  Comments: Pt requires seated rest break ~2 mins after first 25 ft stating B legs are feeling heavy. Pt feeling better after seated rest break and agreeable for further ambulate. SaO2 ~91%-94% throughout amb. Stairs/Curb  Stairs?: No     Balance  Posture: Fair  Sitting - Static: Good;-  Sitting - Dynamic: Fair  Standing - Static: Good;-  Standing - Dynamic: Fair;+  Comments: Standing with RW, pt easily fatigued.      Other exercises  Other exercises?: Yes  Other exercises 1: STS x2 with RW  Other exercises 2: Education on pursed lip breathing, breathing with exertion  Other exercises 3: Toilet transfer x1  Other exercises 4: Seated functional dynamic tasks SBA         Comment: Rest breaks PRN              Goals  Short term goals  Time Frame for Short term goals: 2-3 days  Short term goal 1: mod-I bed mobility  Short term goal 2: mod-I transfers  Short term goal 3: mod-I gait with RW x 50-100ft    Plan    Plan  Times per week: 5-6x/wk  Current Treatment Recommendations: Strengthening,Balance Training,Functional Mobility Training,Transfer Training,Gait Training  Safety Devices  Type of devices:  All fall risk precautions in place,Call light within reach,Gait belt,Nurse notified,Left in chair     Therapy Time   Individual Concurrent Group Co-treatment   Time In 1100         Time Out 1135         Minutes Madhu Zamora 76, PTA

## 2022-01-08 NOTE — PROGRESS NOTES
Assessment  Pain Assessment: 0-10  Pain Level: 0    Objective  Cognition  Overall Cognitive Status: WFL  Arousal/Alertness: Appropriate responses to stimuli  Following Directions: Follows two step commands  Attention Span: Attends with cues to redirect  Bed mobility  Supine to Sit: Modified independent  Balance  Sitting Balance: Contact guard assistance (sitting EOB)  Standing Balance: Contact guard assistance  Standing Balance  Time: ~ 3mins ~5-6 mins   Activity: ADLs, functional mobility  Comment: standing rest breaks taken due to desat ~83-89 quick recovery to 90's noted   Functional Mobility  Functional - Mobility Device: Rolling Walker  Assist Level: Contact guard assistance (2nd person for line navigation)   ADL  Feeding: Setup; Adaptive utensils (comment)  Grooming: Setup  UE Bathing: Setup  LE Bathing: Moderate assistance;Setup  UE Dressing: Setup; Increased time to complete  LE Dressing: Moderate assistance; Increased time to complete;Setup (Pt mod I for underwear and shorts, requires assist for socks)  Toileting: Moderate assistance  Additional Comments: ADL scores based on skilled observation and clinical reasoning, unless otherwise noted. Pt completed tolieting task requires assist with stalin care. pt completed UB and LB dressing requiring setup and max A to complete donning of socks     Transfers  Sit to stand: Contact guard assistance  Stand to sit: Contact guard assistance                Assessment  Performance deficits / Impairments: Decreased functional mobility ; Decreased ADL status; Decreased strength;Decreased safe awareness;Decreased endurance;Decreased sensation;Decreased balance  Prognosis: Good  Discharge Recommendations: Continue to assess pending progress  Activity Tolerance: Patient Tolerated treatment well  Safety Devices in place: Yes  Type of devices: Call light within reach; All fall risk precautions in place;Nurse notified; Left in chair  Equipment Recommendations  Equipment Needed: No Plan  Safety Devices  Safety Devices in place: Yes  Type of devices: Call light within reach,All fall risk precautions in place,Nurse notified,Left in chair  Plan  Times per week: 4-5  Times per day: Daily  Current Treatment Recommendations: Strengthening,ROM,Balance Training,Functional Mobility Jose Alfredo Ren Education & Training,Patient/Caregiver Education & Training,Equipment Evaluation, Education, & procurement,Self-Care / ADL      Goals  Short term goals  Time Frame for Short term goals: By discharge  Short term goal 1: Patient will perform BADLs with mod I and good safety  Short term goal 2: Patient will tolerate standing 4+ minutes, with no LOB, mod I and good safety  Short term goal 3: Patient will perform transfers/functional mobility with mod I   Short term goal 4: Patient will V/D at least 3 EC/WS/fall prevention strategies to promote safety with daily routine  Short term goal 5: Patient will actively participate in 15+ minutes of therapeutic activity to promote independence with self care and mobility          01/08/22 1417   OT Individual Minutes   Time In 1100   Time Out 1135   Minutes 35       Electronically signed by Tellis Boas, COTA/L on 1/8/22 at 2:18 PM EST

## 2022-01-08 NOTE — PLAN OF CARE
Problem: Falls - Risk of:  Goal: Will remain free from falls  Description: Will remain free from falls  1/8/2022 0450 by Sarah Horvath RN  Outcome: Met This Shift  Note: Pt is able to make needs known and uses call light appropriately, bed is in low position, call light is within reach, fall sign posted  1/7/2022 1716 by Elena Rodriguez RN  Outcome: Ongoing  Goal: Absence of physical injury  Description: Absence of physical injury  1/8/2022 0450 by Sarah Horvath RN  Outcome: Met This Shift  1/7/2022 1716 by Elena Rodriguez RN  Outcome: Ongoing     Problem: Pain:  Goal: Pain level will decrease  Description: Pain level will decrease  1/8/2022 0450 by Sarah Horvath RN  Outcome: Met This Shift  1/7/2022 1716 by Elena Rodriguez RN  Outcome: Ongoing  Goal: Control of acute pain  Description: Control of acute pain  Outcome: Met This Shift     Problem: Skin Integrity:  Goal: Will show no infection signs and symptoms  Description: Will show no infection signs and symptoms  Outcome: Met This Shift  Goal: Absence of new skin breakdown  Description: Absence of new skin breakdown  Outcome: Met This Shift  Note: No sighns of new skin breakdown this shift

## 2022-01-08 NOTE — CARE COORDINATION
ONGOING DISCHARGE PLANNING NOTE:    Writer reviewed LSW notes, and discharge plan is to DC to [facility] [address]. Awaiting Authorization.     Electronically signed by Gui Linares RN on 1/8/2022 at 12:43 PM

## 2022-01-08 NOTE — PROGRESS NOTES
HOB up  @  40 degrees  Resp.  Easy  @  18/min  Pt sits up in bed, HOB up  @  60 degrees  And eat breakfast

## 2022-01-08 NOTE — PROGRESS NOTES
Bedside reporting done, per Dinorah  No complaints at this time  Pt. Cont. To sit up in a chair at bedside  O 2 cont.  On per NC

## 2022-01-09 LAB
ANION GAP SERPL CALCULATED.3IONS-SCNC: 12 MMOL/L (ref 9–17)
BUN BLDV-MCNC: 49 MG/DL (ref 8–23)
BUN/CREAT BLD: ABNORMAL (ref 9–20)
CALCIUM SERPL-MCNC: 9.4 MG/DL (ref 8.6–10.4)
CHLORIDE BLD-SCNC: 96 MMOL/L (ref 98–107)
CO2: 26 MMOL/L (ref 20–31)
CREAT SERPL-MCNC: 3.99 MG/DL (ref 0.5–0.9)
GFR AFRICAN AMERICAN: 14 ML/MIN
GFR NON-AFRICAN AMERICAN: 11 ML/MIN
GFR SERPL CREATININE-BSD FRML MDRD: ABNORMAL ML/MIN/{1.73_M2}
GFR SERPL CREATININE-BSD FRML MDRD: ABNORMAL ML/MIN/{1.73_M2}
GLUCOSE BLD-MCNC: 256 MG/DL (ref 70–99)
POTASSIUM SERPL-SCNC: 4.7 MMOL/L (ref 3.7–5.3)
SODIUM BLD-SCNC: 134 MMOL/L (ref 135–144)

## 2022-01-09 PROCEDURE — 97110 THERAPEUTIC EXERCISES: CPT

## 2022-01-09 PROCEDURE — 6370000000 HC RX 637 (ALT 250 FOR IP): Performed by: STUDENT IN AN ORGANIZED HEALTH CARE EDUCATION/TRAINING PROGRAM

## 2022-01-09 PROCEDURE — 97530 THERAPEUTIC ACTIVITIES: CPT

## 2022-01-09 PROCEDURE — 6370000000 HC RX 637 (ALT 250 FOR IP): Performed by: PSYCHIATRY & NEUROLOGY

## 2022-01-09 PROCEDURE — 80048 BASIC METABOLIC PNL TOTAL CA: CPT

## 2022-01-09 PROCEDURE — G0378 HOSPITAL OBSERVATION PER HR: HCPCS

## 2022-01-09 PROCEDURE — 6370000000 HC RX 637 (ALT 250 FOR IP): Performed by: FAMILY MEDICINE

## 2022-01-09 PROCEDURE — 6370000000 HC RX 637 (ALT 250 FOR IP): Performed by: INTERNAL MEDICINE

## 2022-01-09 PROCEDURE — 97116 GAIT TRAINING THERAPY: CPT

## 2022-01-09 PROCEDURE — 36415 COLL VENOUS BLD VENIPUNCTURE: CPT

## 2022-01-09 RX ADMIN — INSULIN LISPRO 6 UNITS: 100 INJECTION, SOLUTION INTRAVENOUS; SUBCUTANEOUS at 21:28

## 2022-01-09 RX ADMIN — APIXABAN 5 MG: 5 TABLET, FILM COATED ORAL at 09:47

## 2022-01-09 RX ADMIN — TIZANIDINE 2 MG: 2 TABLET ORAL at 09:45

## 2022-01-09 RX ADMIN — PANTOPRAZOLE SODIUM 40 MG: 40 TABLET, DELAYED RELEASE ORAL at 06:21

## 2022-01-09 RX ADMIN — DOCUSATE SODIUM 100 MG: 100 CAPSULE ORAL at 09:45

## 2022-01-09 RX ADMIN — INSULIN LISPRO 9 UNITS: 100 INJECTION, SOLUTION INTRAVENOUS; SUBCUTANEOUS at 16:31

## 2022-01-09 RX ADMIN — ATORVASTATIN CALCIUM 80 MG: 80 TABLET, FILM COATED ORAL at 09:45

## 2022-01-09 RX ADMIN — RANOLAZINE 1000 MG: 500 TABLET, FILM COATED, EXTENDED RELEASE ORAL at 21:21

## 2022-01-09 RX ADMIN — DEXTROSE 15 G: 15 GEL ORAL at 06:42

## 2022-01-09 RX ADMIN — TIZANIDINE 2 MG: 2 TABLET ORAL at 21:21

## 2022-01-09 RX ADMIN — POLYETHYLENE GLYCOL 3350 17 G: 17 POWDER, FOR SOLUTION ORAL at 09:49

## 2022-01-09 RX ADMIN — TAMSULOSIN HYDROCHLORIDE 0.4 MG: 0.4 CAPSULE ORAL at 09:47

## 2022-01-09 RX ADMIN — ASPIRIN 81 MG: 81 TABLET, CHEWABLE ORAL at 09:45

## 2022-01-09 RX ADMIN — BUSPIRONE HYDROCHLORIDE 7.5 MG: 5 TABLET ORAL at 09:45

## 2022-01-09 RX ADMIN — RANOLAZINE 1000 MG: 500 TABLET, FILM COATED, EXTENDED RELEASE ORAL at 09:45

## 2022-01-09 RX ADMIN — FEBUXOSTAT 40 MG: 40 TABLET, FILM COATED ORAL at 09:49

## 2022-01-09 RX ADMIN — BUSPIRONE HYDROCHLORIDE 7.5 MG: 5 TABLET ORAL at 14:53

## 2022-01-09 RX ADMIN — TIZANIDINE 2 MG: 2 TABLET ORAL at 14:53

## 2022-01-09 RX ADMIN — SODIUM BICARBONATE 650 MG: 650 TABLET ORAL at 21:21

## 2022-01-09 RX ADMIN — DEXTROSE 15 G: 15 GEL ORAL at 06:38

## 2022-01-09 RX ADMIN — CARVEDILOL 3.12 MG: 3.12 TABLET, FILM COATED ORAL at 16:30

## 2022-01-09 RX ADMIN — SODIUM BICARBONATE 650 MG: 650 TABLET ORAL at 09:47

## 2022-01-09 RX ADMIN — DEXTROSE 15 G: 15 GEL ORAL at 07:08

## 2022-01-09 RX ADMIN — INSULIN GLARGINE 73 UNITS: 100 INJECTION, SOLUTION SUBCUTANEOUS at 09:50

## 2022-01-09 RX ADMIN — APIXABAN 5 MG: 5 TABLET, FILM COATED ORAL at 21:21

## 2022-01-09 RX ADMIN — INSULIN GLARGINE 73 UNITS: 100 INJECTION, SOLUTION SUBCUTANEOUS at 21:28

## 2022-01-09 RX ADMIN — INSULIN LISPRO 9 UNITS: 100 INJECTION, SOLUTION INTRAVENOUS; SUBCUTANEOUS at 11:59

## 2022-01-09 RX ADMIN — GABAPENTIN 400 MG: 400 CAPSULE ORAL at 21:21

## 2022-01-09 RX ADMIN — DOCUSATE SODIUM 100 MG: 100 CAPSULE ORAL at 21:21

## 2022-01-09 RX ADMIN — CARVEDILOL 3.12 MG: 3.12 TABLET, FILM COATED ORAL at 09:54

## 2022-01-09 RX ADMIN — TRAZODONE HYDROCHLORIDE 75 MG: 50 TABLET ORAL at 21:23

## 2022-01-09 RX ADMIN — BUSPIRONE HYDROCHLORIDE 7.5 MG: 5 TABLET ORAL at 21:22

## 2022-01-09 NOTE — PROGRESS NOTES
Physical Therapy  Facility/Department: [facility] PROGRESSIVE CARE  Daily Treatment Note  NAME: Pranav Salmeron  : 1960  MRN: [id number]    Date of Service: 2022    Discharge Recommendations:  Home with assist PRN   PT Equipment Recommendations  Equipment Needed: No    Assessment   Body structures, Functions, Activity limitations: Decreased functional mobility ; Decreased strength; Increased pain  History: chronic back pain  REQUIRES PT FOLLOW UP: Yes  Activity Tolerance  Activity Tolerance: Patient limited by fatigue;Patient limited by endurance  Activity Tolerance: Frequent rest breaks PRN     Patient Diagnosis(es): The encounter diagnosis was Acute right-sided thoracic back pain. has a past medical history of Backache, unspecified, CHF (congestive heart failure) ([facility] [address] Chronic kidney disease, Coronary atherosclerosis of artery bypass graft, Cramp of limb, Gallstones, Hypertension, Insomnia, Pneumonia, Type II or unspecified type diabetes mellitus with renal manifestations, not stated as uncontrolled(250.40), Type II or unspecified type diabetes mellitus without mention of complication, not stated as uncontrolled, and Unspecified vitamin D deficiency. has a past surgical history that includes Coronary artery bypass graft; Knee arthroscopy; Carpal tunnel release; Breast surgery; Tonsillectomy; Hand surgery; Ankle fracture surgery; Cholecystectomy, open (N/A); IR TUNNELED CVC PLACE WO SQ PORT/PUMP > 5 YEARS (2021); AV fistula creation (2021); and Dialysis fistula creation (Left, 2021). Restrictions  Restrictions/Precautions  Restrictions/Precautions: General Precautions,Fall Risk  Required Braces or Orthoses?: No  Subjective   General  Chart Reviewed: Yes  Response To Previous Treatment: Patient with no complaints from previous session.   Family / Caregiver Present: No  Subjective  Subjective: Patient in bed upon arrival. Agreeable to therapy   General Comment  Comments: DDEE Ware approved therapy   Pain Screening  Patient Currently in Pain: No  Vital Signs  Patient Currently in Pain: No  Oxygen Therapy  SpO2: 94 %  Pulse Oximeter Device Mode: Intermittent  Pulse Oximeter Device Location: Finger  O2 Device: Nasal cannula  O2 Flow Rate (L/min): 1 L/min       Orientation  Orientation  Overall Orientation Status: Within Functional Limits  Objective   Bed mobility  Rolling to Left: Supervision  Rolling to Right: Supervision  Supine to Sit: Supervision  Scooting: Supervision  Transfers  Sit to Stand: Stand by assistance  Stand to sit: Stand by assistance  Bed to Chair: Stand by assistance  Comment: standing with RW. Ambulation  Ambulation?: Yes  Ambulation 1  Surface: level tile  Device: Rolling Walker  Other Apparatus: O2 (1L)  Assistance: Contact guard assistance;Stand by assistance  Quality of Gait: Steady, no LOB. No buckling of knees this date. Gait Deviations: Increased ELISABET; Decreased step length;Decreased step height  Distance: 5ft x2; 75ft x2  Stairs/Curb  Stairs?: No        Other exercises  Other exercises?: Yes  Other exercises 1: bed mobility   Other exercises 2: seated EOB ~20 minutes. Patient washed up, brushed teeth and changed clothes sitting EOB   Other exercises 3: toilet transfer   Other exercises 4: STS x5 SBA   Other exercises 5: seated B LE exercises x20 reps          Comment: Rest breaks PRN    Goals  Short term goals  Time Frame for Short term goals: 2-3 days  Short term goal 1: mod-I bed mobility  Short term goal 2: mod-I transfers  Short term goal 3: mod-I gait with RW x 50-100ft    Plan    Plan  Times per week: 5-6x/wk  Current Treatment Recommendations: Strengthening,Balance Training,Functional Mobility Training,Transfer Training,Gait Training  Safety Devices  Type of devices:  All fall risk precautions in place,Call light within reach,Gait belt,Nurse notified,Left in chair        01/09/22 1351   PT Individual Minutes   Time In 1028   Time Out 1125   Minutes 57     Electronically signed by Burgess Ryan PTA on 1/9/22 at 1:57 PM EST

## 2022-01-09 NOTE — PROGRESS NOTES
Pt blood sugar 49 this am pt alert and orient able to swallow. Prn glucose gel given and pt able to swallow with out difficulty. Pt did not eat snack last night before bed but ate good supper.

## 2022-01-09 NOTE — PROGRESS NOTES
Awaiting certification for discharge to [facility] of [address] [place] [address].      Grover Gray MD, Kanwal Hurtado

## 2022-01-09 NOTE — CARE COORDINATION
ONGOING DISCHARGE PLAN:    Writer reviewed LSW notes, and discharge plan is to DC to [facility] [address]. Will continue to follow for additional discharge needs.     Electronically signed by Claudeen Scheuermann, RN on 1/9/2022 at 8:14 AM

## 2022-01-10 LAB
ANION GAP SERPL CALCULATED.3IONS-SCNC: 15 MMOL/L (ref 9–17)
BUN BLDV-MCNC: 50 MG/DL (ref 8–23)
BUN/CREAT BLD: ABNORMAL (ref 9–20)
CALCIUM SERPL-MCNC: 9.3 MG/DL (ref 8.6–10.4)
CHLORIDE BLD-SCNC: 98 MMOL/L (ref 98–107)
CO2: 24 MMOL/L (ref 20–31)
CREAT SERPL-MCNC: 3.67 MG/DL (ref 0.5–0.9)
GFR AFRICAN AMERICAN: 15 ML/MIN
GFR NON-AFRICAN AMERICAN: 12 ML/MIN
GFR SERPL CREATININE-BSD FRML MDRD: ABNORMAL ML/MIN/{1.73_M2}
GFR SERPL CREATININE-BSD FRML MDRD: ABNORMAL ML/MIN/{1.73_M2}
GLUCOSE BLD-MCNC: 104 MG/DL (ref 65–105)
GLUCOSE BLD-MCNC: 163 MG/DL (ref 70–99)
GLUCOSE BLD-MCNC: 180 MG/DL (ref 65–105)
GLUCOSE BLD-MCNC: 185 MG/DL (ref 65–105)
GLUCOSE BLD-MCNC: 188 MG/DL (ref 65–105)
GLUCOSE BLD-MCNC: 192 MG/DL (ref 65–105)
GLUCOSE BLD-MCNC: 214 MG/DL (ref 65–105)
GLUCOSE BLD-MCNC: 231 MG/DL (ref 65–105)
GLUCOSE BLD-MCNC: 232 MG/DL (ref 65–105)
GLUCOSE BLD-MCNC: 250 MG/DL (ref 65–105)
GLUCOSE BLD-MCNC: 257 MG/DL (ref 65–105)
GLUCOSE BLD-MCNC: 299 MG/DL (ref 65–105)
GLUCOSE BLD-MCNC: 49 MG/DL (ref 65–105)
GLUCOSE BLD-MCNC: 53 MG/DL (ref 65–105)
GLUCOSE BLD-MCNC: 63 MG/DL (ref 65–105)
GLUCOSE BLD-MCNC: 73 MG/DL (ref 65–105)
GLUCOSE BLD-MCNC: 99 MG/DL (ref 65–105)
POTASSIUM SERPL-SCNC: 4.9 MMOL/L (ref 3.7–5.3)
SODIUM BLD-SCNC: 137 MMOL/L (ref 135–144)

## 2022-01-10 PROCEDURE — 6370000000 HC RX 637 (ALT 250 FOR IP): Performed by: STUDENT IN AN ORGANIZED HEALTH CARE EDUCATION/TRAINING PROGRAM

## 2022-01-10 PROCEDURE — 6370000000 HC RX 637 (ALT 250 FOR IP): Performed by: PSYCHIATRY & NEUROLOGY

## 2022-01-10 PROCEDURE — G0378 HOSPITAL OBSERVATION PER HR: HCPCS

## 2022-01-10 PROCEDURE — 97110 THERAPEUTIC EXERCISES: CPT

## 2022-01-10 PROCEDURE — 97116 GAIT TRAINING THERAPY: CPT

## 2022-01-10 PROCEDURE — 36415 COLL VENOUS BLD VENIPUNCTURE: CPT

## 2022-01-10 PROCEDURE — 6370000000 HC RX 637 (ALT 250 FOR IP): Performed by: FAMILY MEDICINE

## 2022-01-10 PROCEDURE — 80048 BASIC METABOLIC PNL TOTAL CA: CPT

## 2022-01-10 PROCEDURE — 99225 PR SBSQ OBSERVATION CARE/DAY 25 MINUTES: CPT | Performed by: FAMILY MEDICINE

## 2022-01-10 PROCEDURE — 97535 SELF CARE MNGMENT TRAINING: CPT

## 2022-01-10 PROCEDURE — 90935 HEMODIALYSIS ONE EVALUATION: CPT

## 2022-01-10 PROCEDURE — 82947 ASSAY GLUCOSE BLOOD QUANT: CPT

## 2022-01-10 PROCEDURE — 6370000000 HC RX 637 (ALT 250 FOR IP): Performed by: INTERNAL MEDICINE

## 2022-01-10 RX ADMIN — PANTOPRAZOLE SODIUM 40 MG: 40 TABLET, DELAYED RELEASE ORAL at 06:30

## 2022-01-10 RX ADMIN — SODIUM BICARBONATE 650 MG: 650 TABLET ORAL at 21:01

## 2022-01-10 RX ADMIN — INSULIN GLARGINE 73 UNITS: 100 INJECTION, SOLUTION SUBCUTANEOUS at 21:05

## 2022-01-10 RX ADMIN — ATORVASTATIN CALCIUM 80 MG: 80 TABLET, FILM COATED ORAL at 15:21

## 2022-01-10 RX ADMIN — GABAPENTIN 400 MG: 400 CAPSULE ORAL at 21:01

## 2022-01-10 RX ADMIN — CARVEDILOL 3.12 MG: 3.12 TABLET, FILM COATED ORAL at 16:27

## 2022-01-10 RX ADMIN — RANOLAZINE 1000 MG: 500 TABLET, FILM COATED, EXTENDED RELEASE ORAL at 21:01

## 2022-01-10 RX ADMIN — DOCUSATE SODIUM 100 MG: 100 CAPSULE ORAL at 21:01

## 2022-01-10 RX ADMIN — TIZANIDINE 2 MG: 2 TABLET ORAL at 15:27

## 2022-01-10 RX ADMIN — BUSPIRONE HYDROCHLORIDE 7.5 MG: 5 TABLET ORAL at 15:26

## 2022-01-10 RX ADMIN — FEBUXOSTAT 40 MG: 40 TABLET, FILM COATED ORAL at 08:11

## 2022-01-10 RX ADMIN — INSULIN LISPRO 3 UNITS: 100 INJECTION, SOLUTION INTRAVENOUS; SUBCUTANEOUS at 16:28

## 2022-01-10 RX ADMIN — DOCUSATE SODIUM 100 MG: 100 CAPSULE ORAL at 08:11

## 2022-01-10 RX ADMIN — HYDROCODONE BITARTRATE AND ACETAMINOPHEN 1 TABLET: 5; 325 TABLET ORAL at 21:00

## 2022-01-10 RX ADMIN — INSULIN LISPRO 5 UNITS: 100 INJECTION, SOLUTION INTRAVENOUS; SUBCUTANEOUS at 21:05

## 2022-01-10 RX ADMIN — BUSPIRONE HYDROCHLORIDE 7.5 MG: 5 TABLET ORAL at 08:11

## 2022-01-10 RX ADMIN — POLYETHYLENE GLYCOL 3350 17 G: 17 POWDER, FOR SOLUTION ORAL at 08:13

## 2022-01-10 RX ADMIN — SODIUM BICARBONATE 650 MG: 650 TABLET ORAL at 08:11

## 2022-01-10 RX ADMIN — TRAZODONE HYDROCHLORIDE 75 MG: 50 TABLET ORAL at 21:00

## 2022-01-10 RX ADMIN — TAMSULOSIN HYDROCHLORIDE 0.4 MG: 0.4 CAPSULE ORAL at 08:11

## 2022-01-10 RX ADMIN — ASPIRIN 81 MG: 81 TABLET, CHEWABLE ORAL at 15:21

## 2022-01-10 RX ADMIN — TIZANIDINE 2 MG: 2 TABLET ORAL at 21:10

## 2022-01-10 RX ADMIN — APIXABAN 5 MG: 5 TABLET, FILM COATED ORAL at 21:01

## 2022-01-10 RX ADMIN — BUSPIRONE HYDROCHLORIDE 7.5 MG: 5 TABLET ORAL at 21:01

## 2022-01-10 ASSESSMENT — PAIN SCALES - GENERAL
PAINLEVEL_OUTOF10: 7
PAINLEVEL_OUTOF10: 5

## 2022-01-10 ASSESSMENT — PAIN DESCRIPTION - ORIENTATION: ORIENTATION: RIGHT;LEFT

## 2022-01-10 ASSESSMENT — PAIN DESCRIPTION - LOCATION: LOCATION: FOOT

## 2022-01-10 ASSESSMENT — PAIN DESCRIPTION - PAIN TYPE: TYPE: ACUTE PAIN;CHRONIC PAIN

## 2022-01-10 NOTE — PROGRESS NOTES
FAMILY MEDICINE  - PROGRESS NOTE    Date:  1/10/2022  [facility]  [id number]      Chief Complaint   Patient presents with    Back Pain         Interval History:  not changed much, she has no new complaints this am. Her pain has resolved. No significant events overnight. Specialists notes, labs & imaging reviewed. She is supposed to be discharged today to a SNF.       Subjective  Constitutional: positive for fatigue and obesity  Musculoskeletal:positive for muscle weakness and myalgias  Neurological: positive for memory problems and tremors  Endocrine: positive for diabetic symptoms including neuropathy and hyperglycemia:    Objective:    BP (!) 110/40   Pulse 58   Temp 97.4 °F (36.3 °C) (Oral)   Resp 18   Ht 5' 5\" (1.651 m)   Wt 264 lb 8.8 oz (120 kg)   SpO2 95%   BMI 44.02 kg/m²   General appearance - overweight  Mental status - drowsy  Eyes - not examined  Ears - bilateral TM's and external ear canals normal  Nose - normal and patent, no erythema, discharge or polyps  Mouth - mucous membranes moist, pharynx normal without lesions  Neck - supple, no significant adenopathy  Lymphatics - no palpable lymphadenopathy, no hepatosplenomegaly  Chest - clear to auscultation, no wheezes, rales or rhonchi, symmetric air entry  Heart - normal rate, regular rhythm, normal S1, S2, no murmurs, rubs, clicks or gallops  Abdomen - soft, nontender, nondistended, no masses or organomegaly  Breasts - not examined  Back exam - not examined  Neurological - neck supple without rigidity  Musculoskeletal - no joint tenderness, deformity or swelling  Extremities - peripheral pulses normal, no pedal edema, no clubbing or cyanosis  Skin - normal coloration and turgor, no rashes, no suspicious skin lesions noted    Data:   Medications:   Current Facility-Administered Medications   Medication Dose Route Frequency Provider Last Rate Last Admin    gabapentin (NEURONTIN) capsule 400 Ashwin Mitchell MD   6 Units at 01/09/22 2128    HYDROcodone-acetaminophen (NORCO) 5-325 MG per tablet 1 tablet  1 tablet Oral Q6H PRN Cipriano Hanks MD   1 tablet at 01/08/22 0750       Intake/Output Summary (Last 24 hours) at 1/10/2022 9079  Last data filed at 1/9/2022 1805  Gross per 24 hour   Intake 540 ml   Output --   Net 540 ml     Recent Results (from the past 24 hour(s))   Basic Metabolic Panel w/ Reflex to MG    Collection Time: 01/09/22  6:15 PM   Result Value Ref Range    Glucose 256 (H) 70 - 99 mg/dL    BUN 49 (H) 8 - 23 mg/dL    CREATININE 3.99 (H) 0.50 - 0.90 mg/dL    Bun/Cre Ratio NOT REPORTED 9 - 20    Calcium 9.4 8.6 - 10.4 mg/dL    Sodium 134 (L) 135 - 144 mmol/L    Potassium 4.7 3.7 - 5.3 mmol/L    Chloride 96 (L) 98 - 107 mmol/L    CO2 26 20 - 31 mmol/L    Anion Gap 12 9 - 17 mmol/L    GFR Non-African American 11 (L) >60 mL/min    GFR  14 (L) >60 mL/min    GFR Comment          GFR Staging NOT REPORTED      -----------------------------------------------------------------  RAD:  EKG:  Micro:     Assessment & Plan:    Patient Active Problem List:     Atherosclerosis of coronary artery bypass graft of native heart without angina pectoris     Acute renal failure (HCC)     Diabetes mellitus due to underlying condition with diabetic nephropathy, with long-term current use of insulin (HCC)     Chronic diastolic heart failure (HCC)     Diabetic polyneuropathy associated with type 2 diabetes mellitus (HCC)     History of coronary artery bypass graft     Iron deficiency anemia     Spinal stenosis of lumbar region with neurogenic claudication     Mixed hyperlipidemia     Stage 4 chronic kidney disease (HCC)     Type 2 diabetes mellitus with kidney complication, with long-term current use of insulin (HCC)     Syncope and collapse     Obesity, Class II, BMI 35-39.9     Thyroid nodule greater than or equal to 1 cm in diameter incidentally noted on imaging study     Essential hypertension Anemia in stage 4 chronic kidney disease (HCC)     Chronic ischemic heart disease     Ischemic stroke of frontal lobe (HCC)     Stroke-like symptoms     Morbid obesity with BMI of 40.0-44.9, adult (HCC)     Acute encephalopathy     Disequilibrium syndrome     Generalized weakness     Long-term memory loss     Muscle right arm weakness     Anxiety     Chronic midline low back pain with bilateral sciatica     Need for immunization against influenza     Altered mental status     Acute right-sided thoracic back pain     Tremor     Metabolic encephalopathy           Plan:  -Metabolic encephalopathy - mental status back at baseline. -ESRD on dialysis - Nephrology managing. -DM2 - stable.  -Emotional lability - Psychiatry input appreciated, normal grief reaction. -D/C planning ongoing - awaiting pre-cert to [facility], hopefully it will be obtained tomorrow.  -Continue current treatments.  -Complete orders per chart.     See orders   Disposition:    Electronically signed by Reno Renae MD on 1/10/2022 at 6:19 AM

## 2022-01-10 NOTE — PROGRESS NOTES
Department of Internal Medicine  Nephrology Venu Bowden MD   Progress Note    Reason for consultation: Management of hemodialysis dependent end-stage renal disease. Consulting physician: Lyssa Rhoades MD    Interval history: Patient was seen and examined today and she feels well. She does not have shortness of breath or chest pain. She denies shortness of breath she has a left arm AV fistula with bruit and thrill which is maturing. History of present illness: This is a 59 y.o. female with a significant past medical history of Chronic atrial fibrillation [on Eliquis], Type 2 diabetes mellitus, essential systemic hypertension, coronary artery disease [s/p CABG in 2004 and PTCA in 2019], heart failure with preserved ejection fraction [LVEF 55%] and end-stage renal disease secondary to diabetic and hypertensive nephropathy [on routine hemodialysis on a Monday/Wednesday/Friday schedule at [address] [facility] [address] dialysis unit on [facility] under the care of Dr. Mathieu Rossi since August 2001 using IJ tunneled dialysis catheter], who presented to the hospital via EMS for evaluation of worsening low back pain rated 10/10. She was seen and examined this morning and she remains quite lethargic and stuporous and difficult to arouse. She however is not in acute respiratory distress.     Scheduled Meds:   gabapentin  400 mg Oral Nightly    epoetin raphael-epbx  3,000 Units SubCUTAneous Once per day on Mon Wed Fri    sodium bicarbonate  650 mg Oral BID    carvedilol  3.125 mg Oral BID WC    tiZANidine  2 mg Oral TID    lidocaine  1 patch TransDERmal Daily    aspirin  81 mg Oral Daily    atorvastatin  80 mg Oral Daily    busPIRone  7.5 mg Oral TID    docusate sodium  100 mg Oral BID    apixaban  5 mg Oral BID    febuxostat  40 mg Oral Daily    insulin glargine  73 Units SubCUTAneous BID    pantoprazole  40 mg Oral QAM AC    polyethylene glycol  17 g Oral Daily    ranolazine  1,000 mg Oral BID    tamsulosin  0.4 mg Oral Daily    traZODone  75 mg Oral Nightly    sodium chloride flush  5-40 mL IntraVENous 2 times per day    insulin lispro  0-18 Units SubCUTAneous TID WC    insulin lispro  0-9 Units SubCUTAneous Nightly     Continuous Infusions:   dextrose      sodium chloride       PRN Meds:.anticoagulant sodium citrate, anticoagulant sodium citrate, tiZANidine, senna, glucose, dextrose, glucagon (rDNA), dextrose, sodium chloride flush, sodium chloride, ondansetron **OR** ondansetron, polyethylene glycol, acetaminophen **OR** acetaminophen, HYDROcodone 5 mg - acetaminophen    Physical Exam:    VITALS:  BP (!) 142/65   Pulse 74   Temp 97 °F (36.1 °C)   Resp 18   Ht 5' 5\" (1.651 m)   Wt 264 lb 8.8 oz (120 kg)   SpO2 95%   BMI 44.02 kg/m²   24HR INTAKE/OUTPUT:      Intake/Output Summary (Last 24 hours) at 1/10/2022 1351  Last data filed at 1/10/2022 5440  Gross per 24 hour   Intake 830 ml   Output --   Net 830 ml       Constitutional: slowed mentation and Lethargic and difficult to arouse    Skin: Skin color, texture, turgor normal. No rashes or lesions    Head: Normocephalic, without obvious abnormality, atraumatic     Cardiovascular/Edema: regular rate and rhythm, S1, S2 normal, no murmur, click, rub or gallop    Respiratory: Lungs: clear to auscultation bilaterally    Abdomen: soft, non-tender; bowel sounds normal; no masses,  no organomegaly    Back: symmetric, no curvature. ROM normal. No CVA tenderness. Extremities: edema +    Neuro:  Stuporous and lethargic. CBC:   No results for input(s): WBC, HGB, PLT in the last 72 hours.   BMP:    Recent Labs     01/09/22  1815 01/10/22  0859   * 137   K 4.7 4.9   CL 96* 98   CO2 26 24   BUN 49* 50*   CREATININE 3.99* 3.67*   GLUCOSE 256* 163*     Lab Results   Component Value Date    NITRU NEGATIVE 11/14/2021    COLORU Yellow 11/14/2021    PHUR 5.0 11/14/2021    WBCUA 5 TO 10 11/14/2021    RBCUA 0 TO 2 11/14/2021    MUCUS NOT REPORTED 11/14/2021    TRICHOMONAS NOT REPORTED 11/14/2021    YEAST FEW 11/14/2021    BACTERIA MANY 11/14/2021    SPECGRAV 1.014 11/14/2021    LEUKOCYTESUR TRACE 11/14/2021    UROBILINOGEN Normal 11/14/2021    BILIRUBINUR NEGATIVE 11/14/2021    GLUCOSEU 3+ 11/14/2021    KETUA NEGATIVE 11/14/2021    AMORPHOUS NOT REPORTED 11/14/2021     Urine Sodium:     Lab Results   Component Value Date    JASON 47 11/14/2021     Urine Chloride:    Lab Results   Component Value Date    CLUR 26 11/14/2021     Urine Protein:     Lab Results   Component Value Date    TPU 20 11/12/2021     Urine Creatinine:     Lab Results   Component Value Date    LABCREA 72.0 11/14/2021     IMPRESSION/RECOMMENDATIONS:      1.  End-stage renal disease - we will maintain MWF hemodialysis schedule. Vascular access is IJ tunneled hemodialysis catheter. Status post left arm AV fistula placement November 2021 which is maturing. For hemodialysis today  Renal diet,i.e 2-gram sodium,2-gram potassium,1200 ml fluid restriction,1-gram phosphorus, 1800 KCal and 1.2 gram protein per day. 2.  Altered mental status - Mental status is back to baseline. 3.  Normocytic anemia of chronic kidney disease - Continue Retacrit 3 times weekly    4. Systemic hypertension - blood pressure control is adequate. No objections to discharge date to outpatient rehab    Prognosis is guarded.     Cherelle Wilkins MD FACP  Attending Nephrologist  1/10/2022 1:51 PM

## 2022-01-10 NOTE — FLOWSHEET NOTE
01/10/22 1237   Encounter Summary   Services provided to: Patient   Referral/Consult From: [facility]   (1/10/22 V)   Complexity of Encounter Low   Length of Encounter 15 minutes   Spiritual/Yazdanism   Type Spiritual support   Intervention Prayer;Communion   Sacraments   Communion Patient received communion

## 2022-01-10 NOTE — PROGRESS NOTES
Pt blood sugar was 53 initially this morning. Pt given apple juice as intervention. After 15 minutes, pt blood sugar recheck was 73. No sliding scale insulin given this morning.

## 2022-01-10 NOTE — PROGRESS NOTES
BONNIE spoke with Robb from [place] and she did not receive authorization for pt over the weekend. Robb has bed available for pt, but cannot accept until Alejandrina Ceja is received. Authorization was not received on this date.

## 2022-01-10 NOTE — PROGRESS NOTES
[address] [facility]    INPATIENT OCCUPATIONAL THERAPY  PROGRESS NOTE  Date: 1/10/2022  Patient Name: Sin Mandel      Room: /9-01  MRN: [id number]    : 1960  (59 y.o.) Gender: female     Discharge Recommendations:  Further Occupational Therapy is recommended upon facility discharge. Equipment Needed: No    Referring Practitioner: Ady Elias MD  Diagnosis: Acute right-sided thoracic back pain  General  Chart Reviewed: Yes  Patient assessed for rehabilitation services?: Yes  Additional Pertinent Hx: 59 y.o. female with past medical history significant for end-stage renal disease on dialysis, CAD, diabetes mellitus via EMS with reports of back pain. Patient is alert although does not seem to be answering questions appropriately, history is unreliable with multiple stories being reported by patient. Patient reports back pain right-sided with no injury initially and then reports that she was picking something up and did have pain of the right side which happened a week ago. Patient does state that she has had prior incidence of back spasms and back pain. Response to previous treatment: Patient with no complaints from previous session  Family / Caregiver Present: No  Referring Practitioner: Ady Elias MD  Diagnosis: Acute right-sided thoracic back pain    Restrictions  Restrictions/Precautions: General Precautions,Fall Risk  Required Braces or Orthoses?: No      Subjective  Subjective: Pt reports her dad recently passed away, emotional support provided with good return. Comments: Pt pleasant and motivated for therapy.    Patient Currently in Pain: No  Overall Orientation Status: Within Functional Limits  Orientation Level: Oriented to place;Oriented to time;Oriented to person;Disoriented to situation     Pain Assessment  Pain Assessment: 0-10  Pain Level: 5  Pain Type: Chronic pain  Pain Location: Back  Pain Orientation: Mid,Lower    Objective  Cognition  Overall Cognitive Status: WFL  Arousal/Alertness: Appropriate responses to stimuli  Following Directions: Follows two step commands  Attention Span: Attends with cues to redirect  Memory: Decreased recall of recent events  Bed mobility  Supine to Sit: Supervision  Balance  Sitting Balance: Supervision (static/dynamic EOB for self care tasks. )  Standing Balance: Stand by assistance     Functional Mobility  Functional - Mobility Device: Rolling Walker  Activity: Other (in room )  Assist Level: Stand by assistance  Functional Mobility Comments: good safety and tolerance. ADL  Equipment Provided: Sock aid  UE Dressing: Setup  LE Dressing: Stand by assistance;Minimal assistance (partial A to adjust L sock, SBA otherwise for shorts. )  Additional Comments: completed dressing tasks while seated EOB; static/dynamic.  (some fatigue but well tolerated. )     Transfers  Sit to stand: Stand by assistance  Stand to sit: Stand by assistance  Type of ROM/Therapeutic Exercise  Type of ROM/Therapeutic Exercise: Free weights (3#, x10-15 reps pending plane of movement/tolerance. )  Comment: Pt engaged in BUE ex's while seated upright in bedside chair to promote increased strengthening and endurance. Exercises  Scapular Protraction: x  Scapular Retraction: x  Shoulder Flexion: x  Shoulder Extension: x  Horizontal ABduction: x  Horizontal ADduction: x  Elbow Flexion: x  Elbow Extension: x  Supination: x  Pronation: x  Wrist Flexion: x  Wrist Extension: x        Assessment  Performance deficits / Impairments: Decreased functional mobility ; Decreased ADL status; Decreased strength;Decreased safe awareness;Decreased endurance;Decreased sensation;Decreased balance  Prognosis: Good  Discharge Recommendations: Continue to assess pending progress  Activity Tolerance: Patient Tolerated treatment well  Safety Devices in place: Yes           Plan  Safety Devices  Safety Devices in place: Yes  Type of devices: Left in bed,Call light within reach,All fall risk precautions in place,Nurse notified  Plan  Times per week: 4-5  Times per day: Daily  Current Treatment Recommendations: Strengthening,ROM,Balance Training,Functional Mobility Jose Alfredo Ren Education & Training,Patient/Caregiver Education & Training,Equipment Evaluation, Education, & procurement,Self-Care / ADL      Goals  Short term goals  Time Frame for Short term goals: By discharge  Short term goal 1: Patient will perform BADLs with mod I and good safety  Short term goal 2: Patient will tolerate standing 4+ minutes, with no LOB, mod I and good safety  Short term goal 3: Patient will perform transfers/functional mobility with mod I   Short term goal 4: Patient will V/D at least 3 EC/WS/fall prevention strategies to promote safety with daily routine  Short term goal 5: Patient will actively participate in 15+ minutes of therapeutic activity to promote independence with self care and mobility    OT Individual Minutes  Time In: 2519  Time Out: [address]  Minutes: 34    Electronically signed by DAYANA Schroeder on 1/10/22 at 10:47 AM EST

## 2022-01-10 NOTE — PROGRESS NOTES
Physical Therapy  Facility/Department: [facility] PROGRESSIVE CARE  Daily Treatment Note  NAME: Felisha Lovelace  : 1960  MRN: [id number]    Date of Service: 1/10/2022    Discharge Recommendations:  Home with assist PRN   PT Equipment Recommendations  Equipment Needed: No    Assessment   Body structures, Functions, Activity limitations: Decreased functional mobility ; Decreased strength; Increased pain  History: chronic back pain  REQUIRES PT FOLLOW UP: Yes  Activity Tolerance  Activity Tolerance: Patient limited by fatigue;Patient limited by endurance  Activity Tolerance: Frequent rest breaks PRN     Patient Diagnosis(es): The encounter diagnosis was Acute right-sided thoracic back pain. has a past medical history of Backache, unspecified, CHF (congestive heart failure) ([facility] [address] Chronic kidney disease, Coronary atherosclerosis of artery bypass graft, Cramp of limb, Gallstones, Hypertension, Insomnia, Pneumonia, Type II or unspecified type diabetes mellitus with renal manifestations, not stated as uncontrolled(250.40), Type II or unspecified type diabetes mellitus without mention of complication, not stated as uncontrolled, and Unspecified vitamin D deficiency. has a past surgical history that includes Coronary artery bypass graft; Knee arthroscopy; Carpal tunnel release; Breast surgery; Tonsillectomy; Hand surgery; Ankle fracture surgery; Cholecystectomy, open (N/A); IR TUNNELED CVC PLACE WO SQ PORT/PUMP > 5 YEARS (2021); AV fistula creation (2021); and Dialysis fistula creation (Left, 2021). Restrictions  Restrictions/Precautions  Restrictions/Precautions: General Precautions,Fall Risk  Required Braces or Orthoses?: No  Subjective   General  Chart Reviewed: Yes  Response To Previous Treatment: Patient with no complaints from previous session.   Family / Caregiver Present: No  Subjective  Subjective: Patient in recliner upon arrival. Agreeable to therapy   General Comment  Comments: DEDE Starkey Patient approved therapy. Patient tearful during session abut the passing of her father. Emotional suppport given   Pain Screening  Patient Currently in Pain: No  Vital Signs  Patient Currently in Pain: No       Orientation  Orientation  Overall Orientation Status: Within Functional Limits  Objective      Transfers  Sit to Stand: Supervision  Stand to sit: Supervision  Bed to Chair: Supervision  Ambulation  Ambulation?: Yes  Ambulation 1  Surface: level tile  Device: Rolling Walker  Assistance: Stand by assistance  Quality of Gait: Steady, no LOB. No buckling of knees this date. Gait Deviations: Increased ELISABET; Decreased step length;Decreased step height  Distance: 75ft x2  Stairs/Curb  Stairs?: No        Other exercises  Other exercises?: Yes  Other exercises 1: STS x3 (trialed STS from low surface and patient perfromed with Min A   Other exercises 2: seated B LE exercises x20 reps          Comment: Rest breaks PRN    Goals  Short term goals  Time Frame for Short term goals: 2-3 days  Short term goal 1: mod-I bed mobility  Short term goal 2: mod-I transfers  Short term goal 3: mod-I gait with RW x 50-100ft    Plan    Plan  Times per week: 5-6x/wk  Current Treatment Recommendations: Strengthening,Balance Training,Functional Mobility Training,Transfer Training,Gait Training  Safety Devices  Type of devices:  All fall risk precautions in place,Call light within reach,Gait belt,Nurse notified,Left in chair        01/10/22 1127   PT Individual Minutes   Time In 1011   Time Out 1039   Minutes 28     Electronically signed by Barbara Walters PTA on 1/10/22 at 11:31 AM EST

## 2022-01-10 NOTE — PLAN OF CARE
Problem: Falls - Risk of:  Goal: Will remain free from falls  Description: Will remain free from falls  1/10/2022 1547 by Ashley Kramer RN  Outcome: Ongoing  Note: Pt remained free of falls during shift.    1/10/2022 0253 by Sammie Osler, RN  Outcome: Ongoing  Goal: Absence of physical injury  Description: Absence of physical injury  1/10/2022 1547 by Ashley Kramer RN  Outcome: Ongoing  1/10/2022 0253 by Sammie Osler, RN  Outcome: Ongoing     Problem: Pain:  Goal: Pain level will decrease  Description: Pain level will decrease  1/10/2022 1547 by Ashley Kramer RN  Outcome: Ongoing  1/10/2022 0253 by Sammie Osler, RN  Outcome: Ongoing  Goal: Control of acute pain  Description: Control of acute pain  1/10/2022 1547 by Ashley Kramer RN  Outcome: Ongoing  1/10/2022 0253 by Sammie Osler, RN  Outcome: Ongoing     Problem: Skin Integrity:  Goal: Will show no infection signs and symptoms  Description: Will show no infection signs and symptoms  1/10/2022 1547 by Ashley Kramer RN  Outcome: Ongoing  1/10/2022 0253 by Sammie Osler, RN  Outcome: Ongoing  Goal: Absence of new skin breakdown  Description: Absence of new skin breakdown  1/10/2022 1547 by Ashley Kramer RN  Outcome: Ongoing  1/10/2022 0253 by Sammie Osler, RN  Outcome: Ongoing

## 2022-01-10 NOTE — PROGRESS NOTES
HEMODIALYSIS POST TREATMENT NOTE    Treatment time ordered:180     Actual treatment time: 180    UltraFiltration Goal: 2500  UltraFiltration Removed: 2000      Pre Treatment weight: 120  Post Treatment weight: 118  Estimated Dry Weight: na    Access used:     Central Venous Catheter:          Tunneled or Non-tunneled: tunneled           Site: right chest          Access Flow: good      Internal Access:       AV Fistula or AV Graft: na         Site: na       Access Flow: na       Sign and symptoms of infection: none       If YES: na    Medications or blood products given: no    Chronic outpatient schedule: [facility]    Chronic outpatient unit: [facility]    Summary of response to treatment: pt did well on treatment    Explain if orders NOT met, was physician notified:trinh      ACES flowsheet faxed to patient unit/ placed in patient chart: yes    Post assessment completed: yes    Report given to: Quincy Melgoza documented Safety Checks include the followin) Access and face visible at all times. 2) All connections and blood lines are secure with no kinks. 3) NVL alarm engaged. 4) Hemosafe device applied (if applicable). 5) No collapse of Arterial or Venous blood chambers. 6) All blood lines / pump segments in the air detectors.

## 2022-01-11 LAB
GLUCOSE BLD-MCNC: 159 MG/DL (ref 65–105)
GLUCOSE BLD-MCNC: 194 MG/DL (ref 65–105)
GLUCOSE BLD-MCNC: 229 MG/DL (ref 65–105)
GLUCOSE BLD-MCNC: 292 MG/DL (ref 65–105)
GLUCOSE BLD-MCNC: 316 MG/DL (ref 65–105)

## 2022-01-11 PROCEDURE — G0378 HOSPITAL OBSERVATION PER HR: HCPCS

## 2022-01-11 PROCEDURE — 97110 THERAPEUTIC EXERCISES: CPT

## 2022-01-11 PROCEDURE — 6370000000 HC RX 637 (ALT 250 FOR IP): Performed by: PSYCHIATRY & NEUROLOGY

## 2022-01-11 PROCEDURE — 6370000000 HC RX 637 (ALT 250 FOR IP): Performed by: STUDENT IN AN ORGANIZED HEALTH CARE EDUCATION/TRAINING PROGRAM

## 2022-01-11 PROCEDURE — 99225 PR SBSQ OBSERVATION CARE/DAY 25 MINUTES: CPT | Performed by: FAMILY MEDICINE

## 2022-01-11 PROCEDURE — 6370000000 HC RX 637 (ALT 250 FOR IP): Performed by: INTERNAL MEDICINE

## 2022-01-11 PROCEDURE — 6370000000 HC RX 637 (ALT 250 FOR IP): Performed by: FAMILY MEDICINE

## 2022-01-11 PROCEDURE — 97116 GAIT TRAINING THERAPY: CPT

## 2022-01-11 PROCEDURE — 82947 ASSAY GLUCOSE BLOOD QUANT: CPT

## 2022-01-11 RX ADMIN — INSULIN LISPRO 6 UNITS: 100 INJECTION, SOLUTION INTRAVENOUS; SUBCUTANEOUS at 21:10

## 2022-01-11 RX ADMIN — BUSPIRONE HYDROCHLORIDE 7.5 MG: 5 TABLET ORAL at 21:04

## 2022-01-11 RX ADMIN — BUSPIRONE HYDROCHLORIDE 7.5 MG: 5 TABLET ORAL at 14:17

## 2022-01-11 RX ADMIN — APIXABAN 5 MG: 5 TABLET, FILM COATED ORAL at 09:40

## 2022-01-11 RX ADMIN — RANOLAZINE 1000 MG: 500 TABLET, FILM COATED, EXTENDED RELEASE ORAL at 21:03

## 2022-01-11 RX ADMIN — DOCUSATE SODIUM 100 MG: 100 CAPSULE ORAL at 21:03

## 2022-01-11 RX ADMIN — INSULIN GLARGINE 73 UNITS: 100 INJECTION, SOLUTION SUBCUTANEOUS at 21:10

## 2022-01-11 RX ADMIN — DOCUSATE SODIUM 100 MG: 100 CAPSULE ORAL at 09:40

## 2022-01-11 RX ADMIN — RANOLAZINE 1000 MG: 500 TABLET, FILM COATED, EXTENDED RELEASE ORAL at 09:41

## 2022-01-11 RX ADMIN — CARVEDILOL 3.12 MG: 3.12 TABLET, FILM COATED ORAL at 17:01

## 2022-01-11 RX ADMIN — BUSPIRONE HYDROCHLORIDE 7.5 MG: 5 TABLET ORAL at 09:41

## 2022-01-11 RX ADMIN — ASPIRIN 81 MG: 81 TABLET, CHEWABLE ORAL at 09:41

## 2022-01-11 RX ADMIN — INSULIN LISPRO 12 UNITS: 100 INJECTION, SOLUTION INTRAVENOUS; SUBCUTANEOUS at 17:01

## 2022-01-11 RX ADMIN — CARVEDILOL 3.12 MG: 3.12 TABLET, FILM COATED ORAL at 09:40

## 2022-01-11 RX ADMIN — TIZANIDINE 2 MG: 2 TABLET ORAL at 14:17

## 2022-01-11 RX ADMIN — GABAPENTIN 400 MG: 400 CAPSULE ORAL at 21:04

## 2022-01-11 RX ADMIN — TRAZODONE HYDROCHLORIDE 75 MG: 50 TABLET ORAL at 21:03

## 2022-01-11 RX ADMIN — ATORVASTATIN CALCIUM 80 MG: 80 TABLET, FILM COATED ORAL at 09:40

## 2022-01-11 RX ADMIN — TIZANIDINE 2 MG: 2 TABLET ORAL at 21:03

## 2022-01-11 RX ADMIN — SODIUM BICARBONATE 650 MG: 650 TABLET ORAL at 21:04

## 2022-01-11 RX ADMIN — INSULIN LISPRO 3 UNITS: 100 INJECTION, SOLUTION INTRAVENOUS; SUBCUTANEOUS at 09:42

## 2022-01-11 RX ADMIN — FEBUXOSTAT 40 MG: 40 TABLET, FILM COATED ORAL at 09:40

## 2022-01-11 RX ADMIN — POLYETHYLENE GLYCOL 3350 17 G: 17 POWDER, FOR SOLUTION ORAL at 09:39

## 2022-01-11 RX ADMIN — TAMSULOSIN HYDROCHLORIDE 0.4 MG: 0.4 CAPSULE ORAL at 09:40

## 2022-01-11 RX ADMIN — INSULIN LISPRO 12 UNITS: 100 INJECTION, SOLUTION INTRAVENOUS; SUBCUTANEOUS at 12:04

## 2022-01-11 RX ADMIN — SODIUM BICARBONATE 650 MG: 650 TABLET ORAL at 09:41

## 2022-01-11 RX ADMIN — PANTOPRAZOLE SODIUM 40 MG: 40 TABLET, DELAYED RELEASE ORAL at 09:48

## 2022-01-11 RX ADMIN — INSULIN GLARGINE 73 UNITS: 100 INJECTION, SOLUTION SUBCUTANEOUS at 09:42

## 2022-01-11 RX ADMIN — APIXABAN 5 MG: 5 TABLET, FILM COATED ORAL at 21:04

## 2022-01-11 RX ADMIN — TIZANIDINE 2 MG: 2 TABLET ORAL at 09:41

## 2022-01-11 NOTE — PROGRESS NOTES
Physical Therapy  Facility/Department: [facility] PROGRESSIVE CARE  Daily Treatment Note  NAME: Jae Acharya  : 1960  MRN: [id number]    Date of Service: 2022    Discharge Recommendations:  Home with assist PRN   PT Equipment Recommendations  Equipment Needed: No    Assessment   Body structures, Functions, Activity limitations: Decreased functional mobility ; Decreased strength; Increased pain  History: chronic back pain  REQUIRES PT FOLLOW UP: Yes  Activity Tolerance  Activity Tolerance: Patient limited by fatigue;Patient limited by endurance  Activity Tolerance: Frequent rest breaks PRN     Patient Diagnosis(es): The encounter diagnosis was Acute right-sided thoracic back pain. has a past medical history of Backache, unspecified, CHF (congestive heart failure) ([facility] [address] Chronic kidney disease, Coronary atherosclerosis of artery bypass graft, Cramp of limb, Gallstones, Hypertension, Insomnia, Pneumonia, Type II or unspecified type diabetes mellitus with renal manifestations, not stated as uncontrolled(250.40), Type II or unspecified type diabetes mellitus without mention of complication, not stated as uncontrolled, and Unspecified vitamin D deficiency. has a past surgical history that includes Coronary artery bypass graft; Knee arthroscopy; Carpal tunnel release; Breast surgery; Tonsillectomy; Hand surgery; Ankle fracture surgery; Cholecystectomy, open (N/A); IR TUNNELED CVC PLACE WO SQ PORT/PUMP > 5 YEARS (2021); AV fistula creation (2021); and Dialysis fistula creation (Left, 2021). Restrictions  Restrictions/Precautions  Restrictions/Precautions: General Precautions,Fall Risk  Required Braces or Orthoses?: No  Subjective   General  Chart Reviewed: Yes  Response To Previous Treatment: Patient with no complaints from previous session.   Family / Caregiver Present: No  Subjective  Subjective: Patient in recliner upon arrival. Agreeable to therapy   General Comment  Comments: DEDE Otoole approved therapy. Patient tearful during session abut the passing of her father. Emotional suppport given   Pain Screening  Patient Currently in Pain: No  Vital Signs  Pulse: 85  Heart Rate Source: Monitor  Patient Currently in Pain: No  Oxygen Therapy  SpO2: 94 %  Pulse Oximeter Device Mode: Intermittent  Pulse Oximeter Device Location: Finger  O2 Device: None (Room air)  Patient Observation  Observations: O2 desats to 87% during functional mobility on room air, rest breaks taken O2 quickly recovers. Orientation  Orientation  Overall Orientation Status: Within Functional Limits  Objective      Transfers  Sit to Stand: Supervision  Stand to sit: Supervision  Bed to Chair: Supervision  Ambulation  Ambulation?: Yes  Ambulation 1  Surface: level tile  Device: Rolling Walker  Assistance: Stand by assistance  Quality of Gait: Steady, no LOB. No buckling of knees this date. Gait Deviations: Increased ELISABET; Decreased step length;Decreased step height  Distance: 75ft x2  Stairs/Curb  Stairs?: No        Other exercises  Other exercises?: Yes  Other exercises 1: STS x3 (from different heights)  Other exercises 2: seated B LE exercises x20 reps with green tband          Comment: Rest breaks PRN    Goals  Short term goals  Time Frame for Short term goals: 2-3 days  Short term goal 1: mod-I bed mobility  Short term goal 2: mod-I transfers  Short term goal 3: mod-I gait with RW x 50-100ft    Plan    Plan  Times per week: 5-6x/wk  Current Treatment Recommendations: Strengthening,Balance Training,Functional Mobility Training,Transfer Training,Gait Training  Safety Devices  Type of devices:  All fall risk precautions in place,Call light within reach,Gait belt,Nurse notified,Left in chair        01/11/22 1455   PT Individual Minutes   Time In 1010   Time Out 1042   Minutes 32     Electronically signed by Ravi Rose PTA on 1/11/22 at 2:59 PM EST

## 2022-01-11 NOTE — PROGRESS NOTES
Department of Internal Medicine  Nephrology Tl Shukla MD   Progress Note    Reason for consultation: Management of hemodialysis dependent end-stage renal disease. Consulting physician: Shannen Beth MD    Interval history: Patient was seen and examined today and she complains of persistent bilateral leg tightness from swelling. She does not have calf tenderness. Last hemodialysis was yesterday with removal of 2 kg for a postdialysis weight of 118 kg. History of present illness: This is a 59 y.o. female with a significant past medical history of Chronic atrial fibrillation [on Eliquis], Type 2 diabetes mellitus, essential systemic hypertension, coronary artery disease [s/p CABG in 2004 and PTCA in 2019], heart failure with preserved ejection fraction [LVEF 55%] and end-stage renal disease secondary to diabetic and hypertensive nephropathy [on routine hemodialysis on a Monday/Wednesday/Friday schedule at [address] dialysis unit on [facility] under the care of Dr. Patricia Brasher since August 2001 using IJ tunneled dialysis catheter], who presented to the hospital via EMS for evaluation of worsening low back pain rated 10/10. She was seen and examined this morning and she remains quite lethargic and stuporous and difficult to arouse. She however is not in acute respiratory distress.     Scheduled Meds:   gabapentin  400 mg Oral Nightly    epoetin raphael-epbx  3,000 Units SubCUTAneous Once per day on Mon Wed Fri    sodium bicarbonate  650 mg Oral BID    carvedilol  3.125 mg Oral BID WC    tiZANidine  2 mg Oral TID    lidocaine  1 patch TransDERmal Daily    aspirin  81 mg Oral Daily    atorvastatin  80 mg Oral Daily    busPIRone  7.5 mg Oral TID    docusate sodium  100 mg Oral BID    apixaban  5 mg Oral BID    febuxostat  40 mg Oral Daily    insulin glargine  73 Units SubCUTAneous BID    pantoprazole  40 mg Oral QAM AC    polyethylene glycol  17 g Oral Daily    ranolazine  1,000 mg Oral BID    tamsulosin  0.4 mg Oral Daily    traZODone  75 mg Oral Nightly    sodium chloride flush  5-40 mL IntraVENous 2 times per day    insulin lispro  0-18 Units SubCUTAneous TID WC    insulin lispro  0-9 Units SubCUTAneous Nightly     Continuous Infusions:   dextrose      sodium chloride       PRN Meds:.anticoagulant sodium citrate, anticoagulant sodium citrate, tiZANidine, senna, glucose, dextrose, glucagon (rDNA), dextrose, sodium chloride flush, sodium chloride, ondansetron **OR** ondansetron, polyethylene glycol, acetaminophen **OR** acetaminophen, HYDROcodone 5 mg - acetaminophen    Physical Exam:    VITALS:  /62   Pulse 96   Temp 97.6 °F (36.4 °C)   Resp 18   Ht 5' 5\" (1.651 m)   Wt 260 lb 2.3 oz (118 kg)   SpO2 96%   BMI 43.29 kg/m²   24HR INTAKE/OUTPUT:      Intake/Output Summary (Last 24 hours) at 1/11/2022 0846  Last data filed at 1/10/2022 1603  Gross per 24 hour   Intake 1190 ml   Output --   Net 1190 ml     Constitutional: slowed mentation and Lethargic and difficult to arouse    Skin: Skin color, texture, turgor normal. No rashes or lesions    Head: Normocephalic, without obvious abnormality, atraumatic     Cardiovascular/Edema: regular rate and rhythm, S1, S2 normal, no murmur, click, rub or gallop    Respiratory: clear to auscultation bilaterally    Abdomen: soft, non-tender; bowel sounds normal; no masses,  no organomegaly    Back: symmetric, no curvature. ROM normal. No CVA tenderness. Extremities: edema + No calf tenderness. Neuro:  Awake, alert and oriented. CBC:   No results for input(s): WBC, HGB, PLT in the last 72 hours.   BMP:    Recent Labs     01/09/22  1815 01/10/22  0859   * 137   K 4.7 4.9   CL 96* 98   CO2 26 24   BUN 49* 50*   CREATININE 3.99* 3.67*   GLUCOSE 256* 163*     Lab Results   Component Value Date    NITRU NEGATIVE 11/14/2021    COLORU Yellow 11/14/2021    PHUR 5.0 11/14/2021    WBCUA 5 TO 10 11/14/2021    RBCUA 0 TO 2 11/14/2021    MUCUS NOT REPORTED 11/14/2021    TRICHOMONAS NOT REPORTED 11/14/2021    YEAST FEW 11/14/2021    BACTERIA MANY 11/14/2021    SPECGRAV 1.014 11/14/2021    LEUKOCYTESUR TRACE 11/14/2021    UROBILINOGEN Normal 11/14/2021    BILIRUBINUR NEGATIVE 11/14/2021    GLUCOSEU 3+ 11/14/2021    KETUA NEGATIVE 11/14/2021    AMORPHOUS NOT REPORTED 11/14/2021     Urine Sodium:     Lab Results   Component Value Date    JASON 47 11/14/2021     Urine Chloride:    Lab Results   Component Value Date    CLUR 26 11/14/2021     Urine Protein:     Lab Results   Component Value Date    TPU 20 11/12/2021     Urine Creatinine:     Lab Results   Component Value Date    LABCREA 72.0 11/14/2021     IMPRESSION/RECOMMENDATIONS:      1.  End-stage renal disease - we will maintain MWF hemodialysis schedule. Vascular access is IJ tunneled hemodialysis catheter. Status post left arm AV fistula placement November 2021 which is maturing. Renal diet,i.e 2-gram sodium,2-gram potassium,1200 ml fluid restriction,1-gram phosphorus, 1800 KCal and 1.2 gram protein per day. 2.  Altered mental status - Secondary to toxic/metabolic encephalopathy and now resolved. 3.  Normocytic anemia of chronic kidney disease - Continue Retacrit 3 times weekly    4. Systemic hypertension - blood pressure control is adequate. No objections to discharge date to outpatient rehab    Prognosis is guarded.     Bridget Briggs MD FACP  Attending Nephrologist  1/11/2022 8:46 AM

## 2022-01-11 NOTE — CARE COORDINATION
ONGOING DISCHARGE PLAN:    Patient is alert and oriented x4. Spoke with patient regarding discharge plan and patient confirms that plan is still to go to [facility]. Patient very upset that they are in observation status and says she did not know that. Sister received a letter from [facility] advising they denied inpatient hospital stay. Patient being billed by [facility] since admission on 12/23/21. Waiting on a Pre cert from [facility]  Since her insurance changed as of 1/1/22. Sister will call Medical billing tomorrow to speak with someone about her admission. Patient getting frustrated. Will continue to follow for additional discharge needs.     Electronically signed by Lisa Morales RN on 1/11/2022 at 5:22 PM

## 2022-01-11 NOTE — PROGRESS NOTES
[facility]   OCCUPATIONAL THERAPY MISSED TREATMENT NOTE   INPATIENT   Date: 22  Patient Name: Torrie Cheung       Room:   MRN: [id number]   Account #: [de-identified]    : 1960  (59 y.o.)  Gender: female   Referring Practitioner: Catalina Zhu MD  Diagnosis: Acute right-sided thoracic back pain             REASON FOR MISSED TREATMENT:  Pt declining therapy at this time due to frustration with insurance [facility], GARCIA/L

## 2022-01-12 LAB
ANION GAP SERPL CALCULATED.3IONS-SCNC: 12 MMOL/L (ref 9–17)
BUN BLDV-MCNC: 47 MG/DL (ref 8–23)
BUN/CREAT BLD: ABNORMAL (ref 9–20)
CALCIUM SERPL-MCNC: 9.6 MG/DL (ref 8.6–10.4)
CHLORIDE BLD-SCNC: 101 MMOL/L (ref 98–107)
CO2: 27 MMOL/L (ref 20–31)
CREAT SERPL-MCNC: 3.96 MG/DL (ref 0.5–0.9)
GFR AFRICAN AMERICAN: 14 ML/MIN
GFR NON-AFRICAN AMERICAN: 11 ML/MIN
GFR SERPL CREATININE-BSD FRML MDRD: ABNORMAL ML/MIN/{1.73_M2}
GFR SERPL CREATININE-BSD FRML MDRD: ABNORMAL ML/MIN/{1.73_M2}
GLUCOSE BLD-MCNC: 206 MG/DL (ref 65–105)
GLUCOSE BLD-MCNC: 236 MG/DL (ref 70–99)
GLUCOSE BLD-MCNC: 347 MG/DL (ref 65–105)
HEMOGLOBIN: 9.5 G/DL (ref 12–16)
HEMOGLOBIN: 9.6 G/DL (ref 12–16)
PHOSPHORUS: 2.8 MG/DL (ref 2.6–4.5)
POTASSIUM SERPL-SCNC: 4.9 MMOL/L (ref 3.7–5.3)
SODIUM BLD-SCNC: 140 MMOL/L (ref 135–144)

## 2022-01-12 PROCEDURE — 36415 COLL VENOUS BLD VENIPUNCTURE: CPT

## 2022-01-12 PROCEDURE — 6370000000 HC RX 637 (ALT 250 FOR IP): Performed by: FAMILY MEDICINE

## 2022-01-12 PROCEDURE — 6370000000 HC RX 637 (ALT 250 FOR IP): Performed by: INTERNAL MEDICINE

## 2022-01-12 PROCEDURE — 6370000000 HC RX 637 (ALT 250 FOR IP): Performed by: PSYCHIATRY & NEUROLOGY

## 2022-01-12 PROCEDURE — 6370000000 HC RX 637 (ALT 250 FOR IP): Performed by: STUDENT IN AN ORGANIZED HEALTH CARE EDUCATION/TRAINING PROGRAM

## 2022-01-12 PROCEDURE — 96372 THER/PROPH/DIAG INJ SC/IM: CPT

## 2022-01-12 PROCEDURE — 97530 THERAPEUTIC ACTIVITIES: CPT

## 2022-01-12 PROCEDURE — 84100 ASSAY OF PHOSPHORUS: CPT

## 2022-01-12 PROCEDURE — G0378 HOSPITAL OBSERVATION PER HR: HCPCS

## 2022-01-12 PROCEDURE — 90935 HEMODIALYSIS ONE EVALUATION: CPT

## 2022-01-12 PROCEDURE — 85018 HEMOGLOBIN: CPT

## 2022-01-12 PROCEDURE — 99225 PR SBSQ OBSERVATION CARE/DAY 25 MINUTES: CPT | Performed by: FAMILY MEDICINE

## 2022-01-12 PROCEDURE — 97535 SELF CARE MNGMENT TRAINING: CPT

## 2022-01-12 PROCEDURE — 80048 BASIC METABOLIC PNL TOTAL CA: CPT

## 2022-01-12 PROCEDURE — 6360000002 HC RX W HCPCS: Performed by: INTERNAL MEDICINE

## 2022-01-12 RX ADMIN — GABAPENTIN 400 MG: 400 CAPSULE ORAL at 20:50

## 2022-01-12 RX ADMIN — ATORVASTATIN CALCIUM 80 MG: 80 TABLET, FILM COATED ORAL at 09:45

## 2022-01-12 RX ADMIN — INSULIN GLARGINE 73 UNITS: 100 INJECTION, SOLUTION SUBCUTANEOUS at 08:14

## 2022-01-12 RX ADMIN — INSULIN LISPRO 5 UNITS: 100 INJECTION, SOLUTION INTRAVENOUS; SUBCUTANEOUS at 20:58

## 2022-01-12 RX ADMIN — TIZANIDINE 2 MG: 2 TABLET ORAL at 20:50

## 2022-01-12 RX ADMIN — SODIUM BICARBONATE 650 MG: 650 TABLET ORAL at 20:50

## 2022-01-12 RX ADMIN — INSULIN LISPRO 3 UNITS: 100 INJECTION, SOLUTION INTRAVENOUS; SUBCUTANEOUS at 14:51

## 2022-01-12 RX ADMIN — INSULIN GLARGINE 73 UNITS: 100 INJECTION, SOLUTION SUBCUTANEOUS at 20:59

## 2022-01-12 RX ADMIN — DOCUSATE SODIUM 100 MG: 100 CAPSULE ORAL at 20:50

## 2022-01-12 RX ADMIN — TAMSULOSIN HYDROCHLORIDE 0.4 MG: 0.4 CAPSULE ORAL at 09:45

## 2022-01-12 RX ADMIN — APIXABAN 5 MG: 5 TABLET, FILM COATED ORAL at 09:45

## 2022-01-12 RX ADMIN — ASPIRIN 81 MG: 81 TABLET, CHEWABLE ORAL at 09:45

## 2022-01-12 RX ADMIN — INSULIN LISPRO 3 UNITS: 100 INJECTION, SOLUTION INTRAVENOUS; SUBCUTANEOUS at 16:52

## 2022-01-12 RX ADMIN — FEBUXOSTAT 40 MG: 40 TABLET, FILM COATED ORAL at 14:53

## 2022-01-12 RX ADMIN — INSULIN LISPRO 6 UNITS: 100 INJECTION, SOLUTION INTRAVENOUS; SUBCUTANEOUS at 08:14

## 2022-01-12 RX ADMIN — TIZANIDINE 2 MG: 2 TABLET ORAL at 14:52

## 2022-01-12 RX ADMIN — EPOETIN ALFA-EPBX 3000 UNITS: 3000 INJECTION, SOLUTION INTRAVENOUS; SUBCUTANEOUS at 16:47

## 2022-01-12 RX ADMIN — TIZANIDINE 2 MG: 2 TABLET ORAL at 09:45

## 2022-01-12 RX ADMIN — SODIUM BICARBONATE 650 MG: 650 TABLET ORAL at 09:45

## 2022-01-12 RX ADMIN — HYDROCODONE BITARTRATE AND ACETAMINOPHEN 1 TABLET: 5; 325 TABLET ORAL at 21:09

## 2022-01-12 RX ADMIN — RANOLAZINE 1000 MG: 500 TABLET, FILM COATED, EXTENDED RELEASE ORAL at 20:49

## 2022-01-12 RX ADMIN — DOCUSATE SODIUM 100 MG: 100 CAPSULE ORAL at 09:45

## 2022-01-12 RX ADMIN — RANOLAZINE 1000 MG: 500 TABLET, FILM COATED, EXTENDED RELEASE ORAL at 09:45

## 2022-01-12 RX ADMIN — CARVEDILOL 3.12 MG: 3.12 TABLET, FILM COATED ORAL at 16:56

## 2022-01-12 RX ADMIN — TRAZODONE HYDROCHLORIDE 75 MG: 50 TABLET ORAL at 20:50

## 2022-01-12 RX ADMIN — BUSPIRONE HYDROCHLORIDE 7.5 MG: 5 TABLET ORAL at 09:45

## 2022-01-12 RX ADMIN — APIXABAN 5 MG: 5 TABLET, FILM COATED ORAL at 20:49

## 2022-01-12 RX ADMIN — BUSPIRONE HYDROCHLORIDE 7.5 MG: 5 TABLET ORAL at 14:52

## 2022-01-12 RX ADMIN — BUSPIRONE HYDROCHLORIDE 7.5 MG: 5 TABLET ORAL at 20:49

## 2022-01-12 ASSESSMENT — PAIN SCALES - GENERAL: PAINLEVEL_OUTOF10: 6

## 2022-01-12 NOTE — PROGRESS NOTES
[facility] [facility]   INPATIENT OCCUPATIONAL THERAPY  PROGRESS NOTE  Date: 2022  Patient Name: Raegan Reynoso      Room: 9-01  MRN: [id number]    : 1960  (59 y.o.) Gender: female     Discharge Recommendations:  Further Occupational Therapy is recommended upon facility discharge. Equipment Needed: No    Referring Practitioner: Reji Mccullough MD  Diagnosis: Acute right-sided thoracic back pain  General  Chart Reviewed: Yes  Patient assessed for rehabilitation services?: Yes  Additional Pertinent Hx: 59 y.o. female with past medical history significant for end-stage renal disease on dialysis, CAD, diabetes mellitus via EMS with reports of back pain. Patient is alert although does not seem to be answering questions appropriately, history is unreliable with multiple stories being reported by patient. Patient reports back pain right-sided with no injury initially and then reports that she was picking something up and did have pain of the right side which happened a week ago. Patient does state that she has had prior incidence of back spasms and back pain. Response to previous treatment: Patient with no complaints from previous session  Family / Caregiver Present: No  Referring Practitioner: Reji Mccullough MD  Diagnosis: Acute right-sided thoracic back pain    Restrictions  Restrictions/Precautions: General Precautions,Fall Risk  Required Braces or Orthoses?: No      Subjective  Subjective: Pt reports increased frustration with dealing with insurance. Pt became tearful thoughout session. Emotional support provided throughout. Pt reports that she is not ready to go home.   Comments: [id number] [address] Tee per [facility] for OT treatement  Patient Currently in Pain: Denies  Overall Orientation Status: Within Functional Limits     Pain Assessment  Pain Assessment: 0-10  Pain Level: 5  Pain Type: Chronic pain  Pain Location: Back    Objective  Cognition  Overall Cognitive Status: WFL  Arousal/Alertness: Appropriate responses to stimuli  Following Directions: Follows two step commands  Attention Span: Attends with cues to redirect  Memory: Decreased recall of recent events  Bed mobility  Supine to Sit: Supervision  Sit to Supine: Moderate assistance (A BLE into bed due to fatigue)  Scooting: Supervision  Comment: Bed mobility completed with HOB elevated and use of rails  Balance  Sitting Balance: Stand by assistance (due to dizziness)  Standing Balance: Stand by assistance (due to dizziness)  Standing Balance  Time: 1-2 minutes x 2  Activity: functional transfers/mobility  Comment: with RW. Verbal cues for safety. Pt reports BLE shaking while standing. Functional Mobility  Functional - Mobility Device: Rolling Walker  Activity: Other (within room)  Assist Level: Stand by assistance  Functional Mobility Comments: Verbal cues for hand placement and safety. Pt reported increased dizziness with change in positon. ADL  Feeding: Setup; Adaptive utensils (comment)  Grooming: Setup  UE Bathing: Stand by assistance  LE Bathing: Minimal assistance  UE Dressing: Stand by assistance  LE Dressing: Maximum assistance (A with Compression socks and footies due to dizziness)  Toileting: Contact guard assistance  Additional Comments: ADL scores based on clinical reaonsing and skilled observation unless otherwise noted. Pt attemted to don bilateral compression socks and footies with ot unable to at this time due to increased dizziness reporting \"I feel like my head will explode\". Pt currently limited due to decreased strength, balance, dizziness, and activity tolerance impacting safety and independence with self care tasks. Transfers  Sit to stand: Stand by assistance  Stand to sit: Stand by assistance  Transfer Comments: Verbal cues for hand placment and safety  Exercises  Other: OT to re-assess BUE MMT to assess progression of strength.  Pt demonstrated decreased strength in RUE 3+/5 compaired to LUE 4-/5. Pt verbalized feeling weaker on this date. Assessment  Performance deficits / Impairments: Decreased functional mobility ; Decreased ADL status; Decreased strength;Decreased safe awareness;Decreased endurance;Decreased sensation;Decreased balance  Prognosis: Good  Discharge Recommendations: Patient would benefit from continued therapy after discharge  Activity Tolerance: Patient Tolerated treatment well;Patient limited by fatigue (Limited due to dizziness)  Activity Tolerance: Pt is motivated to participate in therapy despite frustration with insurance at this time; however, she reports feeling significantly lightheaded with movement and fatigues easily  Safety Devices in place: Yes  Type of devices: All fall risk precautions in place;Call light within reach;Gait belt;Patient at risk for falls; Left in bed;Nurse notified (DEDE Lopez in room at end of session)             Patient Education: Safety with transfers, energy conservation during self care tasks     Learner:patient  Method: explanation       Outcome: demonstrated understanding and needs reinforcement     Plan  Safety Devices  Safety Devices in place: Yes  Type of devices:  All fall risk precautions in place,Call light within reach,Gait belt,Patient at risk for falls,Left in bed,Nurse notified ([facility] in room at end of session)  Plan  Times per week: 4-5  Times per day: Daily  Current Treatment Recommendations: Strengthening,ROM,Balance Training,Functional Mobility Jose Alfred[facility] Ren Education & Training,Patient/Caregiver Education & Training,Equipment Evaluation, Education, & procurement,Self-Care / ADL      Goals  Short term goals  Time Frame for Short term goals: By discharge  Short term goal 1: Patient will perform BADLs with mod I and good safety  Short term goal 2: Patient will tolerate standing 4+ minutes, with no LOB, mod I and good safety  Short term goal 3: Patient will perform transfers/functional mobility with mod I   Short term goal 4: Patient will V/D at least 3 EC/WS/fall prevention strategies to promote safety with daily routine  Short term goal 5: Patient will actively participate in 15+ minutes of therapeutic activity to promote independence with self care and mobility    OT Individual Minutes  Time In: 9638  Time Out: 0945  Minutes: 41      Electronically signed by Angelita Cunningham OT on 1/12/22 at 10:09 AM EST

## 2022-01-12 NOTE — PROGRESS NOTES
Department of Internal Medicine  Nephrology Sj Carroll MD   Progress Note    Reason for consultation: Management of hemodialysis dependent end-stage renal disease. Consulting physician: Jarrett Parker MD    Interval history: Patient does not have any new complaints today. She does not have shortness of breath or chest pain. History of present illness: This is a 59 y.o. female with a significant past medical history of Chronic atrial fibrillation [on Eliquis], Type 2 diabetes mellitus, essential systemic hypertension, coronary artery disease [s/p CABG in 2004 and PTCA in 2019], heart failure with preserved ejection fraction [LVEF 55%] and end-stage renal disease secondary to diabetic and hypertensive nephropathy [on routine hemodialysis on a Monday/Wednesday/Friday schedule at [address] [place] [address] dialysis unit on [facility] under the care of Dr. Janice Huff since August 2001 using IJ tunneled dialysis catheter], who presented to the hospital via EMS for evaluation of worsening low back pain rated 10/10. She was seen and examined this morning and she remains quite lethargic and stuporous and difficult to arouse. She however is not in acute respiratory distress.     Scheduled Meds:   gabapentin  400 mg Oral Nightly    epoetin raphael-epbx  3,000 Units SubCUTAneous Once per day on Mon Wed Fri    sodium bicarbonate  650 mg Oral BID    carvedilol  3.125 mg Oral BID WC    tiZANidine  2 mg Oral TID    lidocaine  1 patch TransDERmal Daily    aspirin  81 mg Oral Daily    atorvastatin  80 mg Oral Daily    busPIRone  7.5 mg Oral TID    docusate sodium  100 mg Oral BID    apixaban  5 mg Oral BID    febuxostat  40 mg Oral Daily    insulin glargine  73 Units SubCUTAneous BID    pantoprazole  40 mg Oral QAM AC    polyethylene glycol  17 g Oral Daily    ranolazine  1,000 mg Oral BID    tamsulosin  0.4 mg Oral Daily    traZODone  75 mg Oral Nightly    sodium chloride flush  5-40 mL IntraVENous 2 times per day    insulin lispro  0-18 Units SubCUTAneous TID WC    insulin lispro  0-9 Units SubCUTAneous Nightly     Continuous Infusions:   dextrose      sodium chloride       PRN Meds:.anticoagulant sodium citrate, anticoagulant sodium citrate, tiZANidine, senna, glucose, dextrose, glucagon (rDNA), dextrose, sodium chloride flush, sodium chloride, ondansetron **OR** ondansetron, polyethylene glycol, acetaminophen **OR** acetaminophen, HYDROcodone 5 mg - acetaminophen    Physical Exam:    VITALS:  /60   Pulse 71   Temp 98 °F (36.7 °C)   Resp 18   Ht 5' 5\" (1.651 m)   Wt 260 lb 9.3 oz (118.2 kg)   SpO2 98%   BMI 43.36 kg/m²   24HR INTAKE/OUTPUT:      Intake/Output Summary (Last 24 hours) at 1/12/2022 1238  Last data filed at 1/12/2022 4692  Gross per 24 hour   Intake 240 ml   Output --   Net 240 ml     Constitutional: Awake and alert    Skin: Skin color, texture, turgor normal. No rashes or lesions    Head: Normocephalic, without obvious abnormality, atraumatic     Cardiovascular/Edema: regular rate and rhythm, S1, S2 normal, no murmur, click, rub or gallop    Respiratory: clear to auscultation bilaterally    Abdomen: soft, non-tender; bowel sounds normal; no masses,  no organomegaly    Back: symmetric, no curvature. ROM normal. No CVA tenderness. Extremities: edema + No calf tenderness. Neuro:  Awake, alert and oriented.     CBC:   Recent Labs     01/12/22  0800   HGB 9.5*     BMP:    Recent Labs     01/09/22  1815 01/10/22  0859 01/12/22  0800   * 137 140   K 4.7 4.9 4.9   CL 96* 98 101   CO2 26 24 27   BUN 49* 50* 47*   CREATININE 3.99* 3.67* 3.96*   GLUCOSE 256* 163* 236*     Lab Results   Component Value Date    NITRU NEGATIVE 11/14/2021    COLORU Yellow 11/14/2021    PHUR 5.0 11/14/2021    WBCUA 5 TO 10 11/14/2021    RBCUA 0 TO 2 11/14/2021    MUCUS NOT REPORTED 11/14/2021    TRICHOMONAS NOT REPORTED 11/14/2021    YEAST FEW 11/14/2021    BACTERIA MANY 11/14/2021    SPECGRAV 1.014 11/14/2021 LEUKOCYTESUR TRACE 11/14/2021    UROBILINOGEN Normal 11/14/2021    BILIRUBINUR NEGATIVE 11/14/2021    GLUCOSEU 3+ 11/14/2021    KETUA NEGATIVE 11/14/2021    AMORPHOUS NOT REPORTED 11/14/2021     Urine Sodium:     Lab Results   Component Value Date    JASON 47 11/14/2021     Urine Chloride:    Lab Results   Component Value Date    CLUR 26 11/14/2021     Urine Protein:     Lab Results   Component Value Date    TPU 20 11/12/2021     Urine Creatinine:     Lab Results   Component Value Date    LABCREA 72.0 11/14/2021     IMPRESSION/RECOMMENDATIONS:      1.  End-stage renal disease - we will maintain MWF hemodialysis schedule. Vascular access is IJ tunneled hemodialysis catheter. Status post left arm AV fistula placement November 2021 which is maturing. Renal diet,i.e 2-gram sodium,2-gram potassium,1200 ml fluid restriction,1-gram phosphorus, 1800 KCal and 1.2 gram protein per day. 2.  Normocytic anemia of chronic kidney disease - Continue Retacrit 3 times weekly    3. Mineral and bone disease profile - check serum phosphorus. 4.  Systemic hypertension - blood pressure control is adequate. No renal objection for patient to be discharged to rehab. Prognosis is guarded.     Muna Duran MD FACP  Attending Nephrologist  1/12/2022 12:38 PM

## 2022-01-12 NOTE — PROGRESS NOTES
HEMODIALYSIS PRE-TREATMENT NOTE    Patient Identifiers prior to treatment: name//MRN    Isolation Required:  Yes                      Isolation Type: Contact       (please document if patient is being managed as a PUI/COVID-19 patient)        Hepatitis status:                           Date Drawn                             Result  Hepatitis B Surface Antigen 2021     NEG                     Hepatitis B Surface Antibody 2021 POS     48.51   Hepatitis B Core Antibody 2021 NEG          How was Hepatitis Status verified: Epic chart     Was a copy of the labs you documented provided to facility for the patient's chart: yes    Hemodialysis orders verified: yes    Access Within normal limits ( I.e. s/s of infection,...): yes     Pre-Assessment completed: yes by Shawn Galvez RN    Pre-dialysis report received from: Yesenia                      Time: 09:40

## 2022-01-12 NOTE — PROGRESS NOTES
HEMODIALYSIS POST TREATMENT NOTE    Treatment time ordered: 3Hrs    Actual treatment time: 3Hrs    UltraFiltration Goal: 2Kgs  UltraFiltration Removed: 2Kgs      Pre Treatment weight: 118.2Kgs  Post Treatment weight: 116Kgs  Estimated Dry Weight: 111Kgs    Access used:     Central Venous Catheter:          Tunneled or Non-tunneled: Tunneled           Site: R chest          Access Flow: Good      Internal Access:       AV Fistula or AV Graft: N/A         Site: N/A       Access Flow: good       Sign and symptoms of infection: no       If YES: N/A    Medications or blood products given: N/A    Chronic outpatient schedule: [facility]    Chronic outpatient unit: [facility]    Summary of response to treatment: Tolerated well with no issues throughout trx. 2kgs fluid removal    Explain if orders NOT met, was physician notified:N/A      ACES flowsheet faxed to patient unit/ placed in patient chart: yes    Post assessment completed: yes by Stevan Martel RN    Report given to: Yesenia Sandoval Intra-treatment documented Safety Checks include the followin) Access and face visible at all times. 2) All connections and blood lines are secure with no kinks. 3) NVL alarm engaged. 4) Hemosafe device applied (if applicable). 5) No collapse of Arterial or Venous blood chambers. 6) All blood lines / pump segments in the air detectors.

## 2022-01-12 NOTE — PROGRESS NOTES
FAMILY MEDICINE  - PROGRESS NOTE    Date:  1/12/2022  Kong Glynn  [id number]      Chief Complaint   Patient presents with    Back Pain         Interval History:  not changed much, she has no new physical complaints this am. Her pain has resolved. No significant events overnight. She is upset as she is still awaiting a pre-cert for d/c to [facility]. Also, she is just now realizing that she was hospitalized in Observation and has lots of questions around billing. Specialists notes, labs & imaging reviewed. She is supposed to be discharged today to a SNF.       Subjective  Constitutional: positive for fatigue and obesity  Musculoskeletal:positive for muscle weakness and myalgias  Neurological: positive for memory problems and tremors  Endocrine: positive for diabetic symptoms including neuropathy and hyperglycemia:    Objective:    BP (!) 143/50   Pulse 76   Temp 98 °F (36.7 °C) (Oral)   Resp 16   Ht 5' 5\" (1.651 m)   Wt 260 lb 2.3 oz (118 kg)   SpO2 99%   BMI 43.29 kg/m²   General appearance - overweight  Mental status - drowsy  Eyes - not examined  Ears - bilateral TM's and external ear canals normal  Nose - normal and patent, no erythema, discharge or polyps  Mouth - mucous membranes moist, pharynx normal without lesions  Neck - supple, no significant adenopathy  Lymphatics - no palpable lymphadenopathy, no hepatosplenomegaly  Chest - clear to auscultation, no wheezes, rales or rhonchi, symmetric air entry  Heart - normal rate, regular rhythm, normal S1, S2, no murmurs, rubs, clicks or gallops  Abdomen - soft, nontender, nondistended, no masses or organomegaly  Breasts - not examined  Back exam - not examined  Neurological - neck supple without rigidity  Musculoskeletal - no joint tenderness, deformity or swelling  Extremities - peripheral pulses normal, no pedal edema, no clubbing or cyanosis  Skin - normal coloration and turgor, no rashes, no suspicious skin lesions noted    Data:   Medications:   Current Facility-Administered Medications   Medication Dose Route Frequency Provider Last Rate Last Admin    gabapentin (NEURONTIN) capsule 400 mg  400 mg Oral Nightly El Gray DO   400 mg at 01/11/22 2104    epoetin raphael-epbx (RETACRIT) injection 3,000 Units  3,000 Units SubCUTAneous Once per day on Mon Wed Fri Yolette Lam MD   3,000 Units at 01/07/22 1428    sodium bicarbonate tablet 650 mg  650 mg Oral BID Yolette Lam MD   650 mg at 01/11/22 2104    carvedilol (COREG) tablet 3.125 mg  3.125 mg Oral BID WC Yolette Lam MD   3.125 mg at 01/11/22 1701    anticoagulant sodium citrate 4 % injection 1.9 mL  1.9 mL IntraCATHeter PRN Yolette Lam MD   1.9 mL at 01/07/22 1238    anticoagulant sodium citrate 4 % injection 1.9 mL  1.9 mL IntraCATHeter PRN Yolette Lam MD   1.9 mL at 01/07/22 1238    tiZANidine (ZANAFLEX) tablet 4 mg  4 mg Oral Q6H PRN Carter Durán MD   4 mg at 12/30/21 0123    tiZANidine (ZANAFLEX) tablet 2 mg  2 mg Oral TID Carter Durán MD   2 mg at 01/11/22 2103    lidocaine 4 % external patch 1 patch  1 patch TransDERmal Daily Reinaldo Staples DO   1 patch at 01/11/22 0940    aspirin chewable tablet 81 mg  81 mg Oral Daily Deisi Boogie MD   81 mg at 01/11/22 0941    atorvastatin (LIPITOR) tablet 80 mg  80 mg Oral Daily Deisi Boogie MD   80 mg at 01/11/22 0940    busPIRone (BUSPAR) tablet 7.5 mg  7.5 mg Oral TID Deisi Boogie MD   7.5 mg at 01/11/22 2104    docusate sodium (COLACE) capsule 100 mg  100 mg Oral BID Deisi Boogie MD   100 mg at 01/11/22 2103    apixaban (ELIQUIS) tablet 5 mg  5 mg Oral BID Deisi Boogie MD   5 mg at 01/11/22 2104    febuxostat (ULORIC) tablet 40 mg  40 mg Oral Daily Deisi Boogie MD   40 mg at 01/11/22 0940    insulin glargine (LANTUS) injection vial 73 Units  73 Units SubCUTAneous BID Deisi Boogie MD   73 Units at 01/11/22 2110    pantoprazole (PROTONIX) tablet 40 mg  40 mg Oral QAM AC Stephanie Pastor MD   40 mg at 01/11/22 0948    polyethylene glycol (GLYCOLAX) packet 17 g  17 g Oral Daily Stephanie Pastor MD   17 g at 01/11/22 3557    ranolazine (RANEXA) extended release tablet 1,000 mg  1,000 mg Oral BID Stephanie Pastor MD   1,000 mg at 01/11/22 2103    senna (SENOKOT) tablet 17.2 mg  2 tablet Oral Daily PRN Stephanie Pastor MD        [facility] capsule 0.4 mg  0.4 mg Oral Daily Stephanie Pastor MD   0.4 mg at 01/11/22 0940    traZODone (DESYREL) tablet 75 mg  75 mg Oral Nightly Stephanie Pastor MD   75 mg at 01/11/22 2103    glucose (GLUTOSE) 40 % oral gel 15 g  15 g Oral PRN Stephanie Pastor MD   15 g at 01/09/22 0708    dextrose 50 % IV solution  12.5 g IntraVENous PRN Stephanie Pastor MD        glucagon (rDNA) injection 1 mg  1 mg IntraMUSCular PRN Stephanie Pastor MD        dextrose 5 % solution  100 mL/hr IntraVENous PRN Stephanie Pastor MD        sodium chloride flush 0.9 % injection 5-40 mL  5-40 mL IntraVENous 2 times per day Stephanie Pastor MD   10 mL at 01/01/22 2218    sodium chloride flush 0.9 % injection 5-40 mL  5-40 mL IntraVENous PRN Stephanie Pastor MD        0.9 % sodium chloride infusion  25 mL IntraVENous RUSTYN Stephanie Pastor MD        ondansetron (ZOFRAN-ODT) disintegrating tablet 4 mg  4 mg Oral Q8H PRN Stephanie Pastor MD   4 mg at 01/08/22 0751    Or    ondansetron (ZOFRAN) injection 4 mg  4 mg IntraVENous Q6H PRN Stephanie Pastor MD        polyethylene glycol [facility]) packet 17 g  17 g Oral Daily PRN Stephanie Pastor MD        acetaminophen (TYLENOL) tablet 650 mg  650 mg Oral Q6H PRN Stephanie Pastor MD   650 mg at 01/03/22 2114    Or    acetaminophen (TYLENOL) suppository 650 mg  650 mg Rectal Q6H PRN Stephanie Pastor MD        insulin lispro (HUMALOG) injection vial 0-18 Units  0-18 Units SubCUTAneous TID WC Tay Kennedy MD   12 Units at 01/11/22 1701    insulin lispro (HUMALOG) injection vial 0-9 Units  0-9 Units SubCUTAneous Nightly Tay Kennedy MD   6 Units at 01/11/22 2110    HYDROcodone-acetaminophen (NORCO) 5-325 MG per tablet 1 tablet  1 tablet Oral Q6H PRN Tay Kennedy MD   1 tablet at 01/10/22 2100     No intake or output data in the 24 hours ending 01/12/22 0616  Recent Results (from the past 24 hour(s))   POC Glucose Fingerstick    Collection Time: 01/11/22  7:38 AM   Result Value Ref Range    POC Glucose 194 (H) 65 - 105 mg/dL   POC Glucose Fingerstick    Collection Time: 01/11/22 11:08 AM   Result Value Ref Range    POC Glucose 316 (H) 65 - 105 mg/dL     -----------------------------------------------------------------  RAD:  EKG:  Micro:     Assessment & Plan:    Patient Active Problem List:     Atherosclerosis of coronary artery bypass graft of native heart without angina pectoris     Acute renal failure ([facility]     Diabetes mellitus due to underlying condition with diabetic nephropathy, with long-term current use of insulin ([facility])     Chronic diastolic heart failure ([facility])     Diabetic polyneuropathy associated with type 2 diabetes mellitus ([facility]     History of coronary artery bypass graft     Iron deficiency anemia     Spinal stenosis of lumbar region with neurogenic claudication     Mixed hyperlipidemia     Stage 4 chronic kidney disease ([facility]     Type 2 diabetes mellitus with kidney complication, with long-term current use of insulin ([facility])     Syncope and collapse     Obesity, Class II, BMI 35-39.9     Thyroid nodule greater than or equal to 1 cm in diameter incidentally noted on imaging study     Essential hypertension     Anemia in stage 4 chronic kidney disease (HCC)     Chronic ischemic heart disease     Ischemic stroke of frontal lobe ([facility])     Stroke-like symptoms     Morbid obesity with BMI of 40.0-44.9, adult ([facility]     Acute encephalopathy     Disequilibrium syndrome     Generalized weakness     Long-term memory loss     Muscle right arm weakness     Anxiety     Chronic midline low back pain with bilateral sciatica     Need for immunization against influenza     Altered mental status     Acute right-sided thoracic back pain     Tremor     Metabolic encephalopathy           Plan:  -Metabolic encephalopathy - mental status back at baseline. -ESRD on dialysis - Nephrology managing. -DM2 - stable.  -Emotional lability - Psychiatry input appreciated, normal grief reaction. -D/C planning ongoing - awaiting pre-cert to [facility], hopefully it will be obtained tomorrow.  -Continue current treatments.  -Complete orders per chart.     See orders   Disposition:    Electronically signed by Fritz Hough MD on 1/12/2022 at 6:16 AM

## 2022-01-12 NOTE — CARE COORDINATION
Writer was asked by Nursing, to see pt. In regards to Insurance/Billing issues. Writer was informed by Pt. That Lianne Meckel from [facility] informed her, that the [facility], will now be under [employer], for she Changed insurances in January. LSW is following for DC to [facility]. Also that, Tamar Paul, from [employer], Stated that Authorization, is under Medical Review, for the request was placed as routine ( which takes 10 Calendar Days) & it should have been placed as Urgent Request ( which takes 24 Hours). Pt./Sister, are upset, that no one told them they are in Observation status & have many billing questions. Writer informed Lucas Cortés, who is still, following for [facility], of this & we both spoke to Denis Velasco, from [address] in regards to above. She would like to Speak to Tamar Paul, from [employer], ([phone]) to discuss this. Number was given to [address] will follow. Writer also gave Gogo Roach, Medical Billing Number, [phone] to discuss Insurance Billing Issues. Writer will continue to try to assist w/ any needs.

## 2022-01-12 NOTE — FLOWSHEET NOTE
01/12/22 1822   Encounter Summary   Services provided to: Patient not available  (patient not in her room)

## 2022-01-12 NOTE — FLOWSHEET NOTE
01/12/22 1623   Encounter Summary   Services provided to: Patient   Referral/Consult From: [facility]   (1/12/22V)   Volunteer Visit Yes   Complexity of Encounter Low   Length of Encounter 15 minutes   Spiritual/Synagogue   Type Spiritual support   Intervention Communion;Prayer   Sacraments   Communion Patient received communion

## 2022-01-13 VITALS
WEIGHT: 255 LBS | HEIGHT: 65 IN | OXYGEN SATURATION: 94 % | HEART RATE: 79 BPM | BODY MASS INDEX: 42.49 KG/M2 | TEMPERATURE: 98.2 F | RESPIRATION RATE: 18 BRPM | SYSTOLIC BLOOD PRESSURE: 138 MMHG | DIASTOLIC BLOOD PRESSURE: 70 MMHG

## 2022-01-13 LAB
GLUCOSE BLD-MCNC: 152 MG/DL (ref 65–105)
GLUCOSE BLD-MCNC: 190 MG/DL (ref 65–105)
GLUCOSE BLD-MCNC: 196 MG/DL (ref 65–105)
GLUCOSE BLD-MCNC: 285 MG/DL (ref 65–105)
GLUCOSE BLD-MCNC: 325 MG/DL (ref 65–105)

## 2022-01-13 PROCEDURE — 6370000000 HC RX 637 (ALT 250 FOR IP): Performed by: FAMILY MEDICINE

## 2022-01-13 PROCEDURE — 82947 ASSAY GLUCOSE BLOOD QUANT: CPT

## 2022-01-13 PROCEDURE — G0378 HOSPITAL OBSERVATION PER HR: HCPCS

## 2022-01-13 PROCEDURE — 6370000000 HC RX 637 (ALT 250 FOR IP): Performed by: PSYCHIATRY & NEUROLOGY

## 2022-01-13 PROCEDURE — 6370000000 HC RX 637 (ALT 250 FOR IP): Performed by: INTERNAL MEDICINE

## 2022-01-13 PROCEDURE — 99217 PR OBSERVATION CARE DISCHARGE MANAGEMENT: CPT | Performed by: FAMILY MEDICINE

## 2022-01-13 PROCEDURE — 6370000000 HC RX 637 (ALT 250 FOR IP): Performed by: STUDENT IN AN ORGANIZED HEALTH CARE EDUCATION/TRAINING PROGRAM

## 2022-01-13 RX ADMIN — TAMSULOSIN HYDROCHLORIDE 0.4 MG: 0.4 CAPSULE ORAL at 10:27

## 2022-01-13 RX ADMIN — INSULIN GLARGINE 73 UNITS: 100 INJECTION, SOLUTION SUBCUTANEOUS at 10:30

## 2022-01-13 RX ADMIN — ASPIRIN 81 MG: 81 TABLET, CHEWABLE ORAL at 10:29

## 2022-01-13 RX ADMIN — DOCUSATE SODIUM 100 MG: 100 CAPSULE ORAL at 10:27

## 2022-01-13 RX ADMIN — APIXABAN 5 MG: 5 TABLET, FILM COATED ORAL at 10:29

## 2022-01-13 RX ADMIN — RANOLAZINE 1000 MG: 500 TABLET, FILM COATED, EXTENDED RELEASE ORAL at 10:28

## 2022-01-13 RX ADMIN — FEBUXOSTAT 40 MG: 40 TABLET, FILM COATED ORAL at 10:39

## 2022-01-13 RX ADMIN — ATORVASTATIN CALCIUM 80 MG: 80 TABLET, FILM COATED ORAL at 10:29

## 2022-01-13 RX ADMIN — CARVEDILOL 3.12 MG: 3.12 TABLET, FILM COATED ORAL at 10:27

## 2022-01-13 RX ADMIN — TIZANIDINE 2 MG: 2 TABLET ORAL at 10:28

## 2022-01-13 RX ADMIN — SODIUM BICARBONATE 650 MG: 650 TABLET ORAL at 10:29

## 2022-01-13 RX ADMIN — PANTOPRAZOLE SODIUM 40 MG: 40 TABLET, DELAYED RELEASE ORAL at 05:57

## 2022-01-13 RX ADMIN — BUSPIRONE HYDROCHLORIDE 7.5 MG: 5 TABLET ORAL at 10:28

## 2022-01-13 NOTE — PLAN OF CARE
Problem: Falls - Risk of:  Goal: Will remain free from falls  Description: Will remain free from falls  Outcome: Ongoing  Note: Patient remains free of falls and injuries throughout shift. Bed remains in the lowest position, wheels locked, call light and bedside table are within reach. Goal: Absence of physical injury  Description: Absence of physical injury  Outcome: Ongoing     Problem: Pain:  Goal: Pain level will decrease  Description: Pain level will decrease  Outcome: Ongoing  Goal: Control of acute pain  Description: Control of acute pain  Outcome: Ongoing  Note: Assess the location, characteristics, onset, duration, frequency, quality, and severity of pain. Encourage immediate report of pain. Use appropriate pain scale to rate pain. Manage pain using nonpharmacologic/pharmacologic interventions.       Problem: Skin Integrity:  Goal: Will show no infection signs and symptoms  Description: Will show no infection signs and symptoms  Outcome: Ongoing  Goal: Absence of new skin breakdown  Description: Absence of new skin breakdown  Outcome: Ongoing

## 2022-01-13 NOTE — PROGRESS NOTES
Suzette Mark from [facility] from [place]. Suzette Mark was able to submit authorization as an Urgent Request. Pt received authorization today and [facility] is able to accept pt. SW scheduled w/c transport with [facility] at 11:00AM for to [facility]. Transport has been changed to 10:30AM.    Number for report: [phone]  Ask for [address]

## 2022-01-17 ENCOUNTER — HOSPITAL ENCOUNTER (OUTPATIENT)
Age: 62
Setting detail: SPECIMEN
Discharge: HOME | End: 2022-01-17

## 2022-01-17 LAB
ALBUMIN SERPL-MCNC: 3.6 G/DL (ref 3.5–5.2)
ALBUMIN/GLOBULIN RATIO: 1.3 (ref 1–2.5)
ALP BLD-CCNC: 88 U/L (ref 35–104)
ALT SERPL-CCNC: 9 U/L (ref 5–33)
ANION GAP SERPL CALCULATED.3IONS-SCNC: 15 MMOL/L (ref 9–17)
AST SERPL-CCNC: 11 U/L
BILIRUB SERPL-MCNC: 0.37 MG/DL (ref 0.3–1.2)
BUN BLDV-MCNC: 45 MG/DL (ref 8–23)
BUN/CREAT BLD: ABNORMAL (ref 9–20)
CALCIUM SERPL-MCNC: 9.4 MG/DL (ref 8.6–10.4)
CHLORIDE BLD-SCNC: 101 MMOL/L (ref 98–107)
CO2: 24 MMOL/L (ref 20–31)
CREAT SERPL-MCNC: 3.85 MG/DL (ref 0.5–0.9)
GFR AFRICAN AMERICAN: 14 ML/MIN
GFR NON-AFRICAN AMERICAN: 12 ML/MIN
GFR SERPL CREATININE-BSD FRML MDRD: ABNORMAL ML/MIN/{1.73_M2}
GFR SERPL CREATININE-BSD FRML MDRD: ABNORMAL ML/MIN/{1.73_M2}
GLUCOSE BLD-MCNC: 184 MG/DL (ref 70–99)
HCT VFR BLD CALC: 29.6 % (ref 36.3–47.1)
HEMOGLOBIN: 9.3 G/DL (ref 11.9–15.1)
MCH RBC QN AUTO: 32.9 PG (ref 25.2–33.5)
MCHC RBC AUTO-ENTMCNC: 31.4 G/DL (ref 28.4–34.8)
MCV RBC AUTO: 104.6 FL (ref 82.6–102.9)
NRBC AUTOMATED: 0 PER 100 WBC
PDW BLD-RTO: 14.5 % (ref 11.8–14.4)
PLATELET # BLD: 181 K/UL (ref 138–453)
PMV BLD AUTO: 10.3 FL (ref 8.1–13.5)
POTASSIUM SERPL-SCNC: 4.2 MMOL/L (ref 3.7–5.3)
RBC # BLD: 2.83 M/UL (ref 3.95–5.11)
SODIUM BLD-SCNC: 140 MMOL/L (ref 135–144)
TOTAL PROTEIN: 6.3 G/DL (ref 6.4–8.3)
WBC # BLD: 5.2 K/UL (ref 3.5–11.3)

## 2022-01-17 PROCEDURE — 36415 COLL VENOUS BLD VENIPUNCTURE: CPT

## 2022-01-17 PROCEDURE — 80053 COMPREHEN METABOLIC PANEL: CPT

## 2022-01-17 PROCEDURE — P9603 ONE-WAY ALLOW PRORATED MILES: HCPCS

## 2022-01-17 PROCEDURE — 85027 COMPLETE CBC AUTOMATED: CPT

## 2022-01-25 ENCOUNTER — CARE COORDINATION (OUTPATIENT)
Dept: CARE COORDINATION | Age: 62
End: 2022-01-25

## 2022-01-26 NOTE — DISCHARGE SUMMARY
Family Medicine Discharge Summary    Lashell Kelley  :  1960  MRN:  [id number]    Admit date:  2021  Discharge date:  2022    Admitting Physician:  Siomara Crook MD    Discharge Diagnoses:    Patient Active Problem List   Diagnosis    Atherosclerosis of coronary artery bypass graft of native heart without angina pectoris    [facility]    Diabetes mellitus due to underlying condition with diabetic nephropathy, with long-term current use of insulin ([facility])    Chronic diastolic heart failure ([facility]    Diabetic polyneuropathy associated with type 2 diabetes mellitus ([facility]    History of coronary artery bypass graft    Iron deficiency anemia    Spinal stenosis of lumbar region with neurogenic claudication    Mixed hyperlipidemia    Stage 4 chronic kidney disease ([facility]    Type 2 diabetes mellitus with kidney complication, with long-term current use of insulin ([facility])    Syncope and collapse    Obesity, Class II, BMI 35-39.9    Thyroid nodule greater than or equal to 1 cm in diameter incidentally noted on imaging study    Essential hypertension    Anemia in stage 4 chronic kidney disease (HCC)    Chronic ischemic heart disease    Ischemic stroke of frontal lobe ([facility])    Stroke-like symptoms    Morbid obesity with BMI of 40.0-44.9, adult (HCC)    Acute encephalopathy    Disequilibrium syndrome    Generalized weakness    Long-term memory loss    Muscle right arm weakness    Anxiety    Chronic midline low back pain with bilateral sciatica    Need for immunization against influenza    Altered mental status    Acute right-sided thoracic back pain    Tremor    Metabolic encephalopathy       Admission Condition:  fair  Discharged Condition:  fair    Hospital Course:   58 yo admitted for acute thoracic back pain. This was controlled with pain medications and muscle relaxers.  Patient required further care at a skilled facility; her discharge was delayed by insurance change and insurance coverage issues, from denial of ARU stay to multiple facilities being contacted before she could be discharged to one. Discharge Medications:         Medication List      START taking these medications    sodium bicarbonate 650 MG tablet  Take 1 tablet by mouth 2 times daily     tiZANidine 4 MG tablet  Commonly known as: ZANAFLEX  Take 1 tablet by mouth every 6 hours as needed (spasm)        CONTINUE taking these medications    aspirin 81 MG chewable tablet  Take 1 tablet by mouth daily     atorvastatin 80 MG tablet  Commonly known as: LIPITOR  Take 1 tablet by mouth daily     Basaglar KwikPen 100 UNIT/ML injection pen  Generic drug: insulin glargine  Inject 73 Units into the skin 2 times daily     busPIRone 7.5 MG tablet  Commonly known as: BUSPAR  Take 1 tablet by mouth 3 times daily     carvedilol 6.25 MG tablet  Commonly known as: Coreg  Take 1 tablet by mouth 2 times daily     docusate sodium 100 MG capsule  Commonly known as: COLACE  Take 1 capsule by mouth 2 times daily     Eliquis 5 MG Tabs tablet  Generic drug: apixaban  Take 1 tablet by mouth 2 times daily     febuxostat 40 MG Tabs tablet  Commonly known as: ULORIC  Take 1 tablet by mouth daily     furosemide 80 MG tablet  Commonly known as: LASIX  Take 1 tablet by mouth 2 times daily     gabapentin 300 MG capsule  Commonly known as: NEURONTIN  Take 1 capsule by mouth 2 times daily for 30 days.      HumaLOG 100 UNIT/ML injection vial  Generic drug: insulin lispro     [employer] Pen Needles 31G X 6 MM Misc  Generic drug: Insulin Pen Needle  1 each by Does not apply route 5 times daily     Lancet Device Misc  1 Device by Does not apply route once for 1 dose     Lancets Misc  1 each by Does not apply route daily     NovoLOG FlexPen 100 UNIT/ML injection pen  Generic drug: insulin aspart  Sliding scale three times daily with meals as follows:  Glucose <139  No Insulin; 140-199  3 Units; 200-249  6 Units; 250-299  9 Units; 300-349  12 Units; 350-400  15 Units; Above 400  18 Units     pantoprazole 40 MG tablet  Commonly known as: PROTONIX  Take 1 tablet by mouth every morning (before breakfast)     ranolazine 1000 MG extended release tablet  Commonly known as: RANEXA  Take 1 tablet by mouth 2 times daily     senna 8.6 MG tablet  Commonly known as: SENOKOT  Take 2 tablets by mouth daily as needed (Constipation)     tamsulosin 0.4 MG capsule  Commonly known as: FLOMAX  Take 1 capsule by mouth daily     traZODone 50 MG tablet  Commonly known as: DESYREL  Take 1.5 tablets by mouth nightly     True Metrix Blood Glucose Test strip  Generic drug: blood glucose test strips  1 each by In Vitro route daily As needed. True Metrix Go Glucose Meter w/Device Kit  1 each by Does not apply route 4 times daily        ASK your doctor about these medications    polyethylene glycol 17 g packet  Commonly known as: GLYCOLAX  Take 17 g by mouth daily  Ask about: Should I take this medication? Where to Get Your Medications      These medications were sent to [address], [address] [place] [id number]    Phone: [phone]   · sodium bicarbonate 650 MG tablet     Information about where to get these medications is not yet available    Ask your nurse or doctor about these medications  · tiZANidine 4 MG tablet         Consults:  Nephrology, neurology, physical medicine, psychiatry    Significant Diagnostic Studies:  Labs, radiology    Treatments:   Supportive therapies    Disposition:   [facility]  Follow up with AFSANEH Roberto CNP in 1-2 weeks after d/c from SNF    Signed:   Shannen Beth MD, FAAFP  1/26/2022, 4:10 PM

## 2022-01-26 NOTE — CARE COORDINATION
Ambulatory Care Coordination Note  1/26/2022  CM Risk Score: 7  Charlson 10 Year Mortality Risk Score: 100%     ACC: Cornelius King RN  A1C 7.6  Summary Note: PCP referral  Spoke with patient, explained care coordination. Patient is accepting of program.  Patient reports that she was in a rehab for a short time and was on oxygen while she was there but was sent home without it. Per patient her pulse ox is between 80-93% she is wanting to get evaluated for home O2. Patient reports that her insurance told her that [facility] is in network. ACM called [facility] who is now [facility]. ACM will work on getting eval and equipment order if needed. Spoke with Claudy reyes from PCP office who educated me on needing a comprehensive home O2 eval through [facility] infant monitoring. ACM will pend order to PCP. Left VM for Philipp at [facility] to see if any O2 testing was done while she was there. Will follow up with patient with any information. Ambulatory Care Coordination Assessment    Care Coordination Protocol  Program Enrollment: Complex Care  Referral from Primary Care Provider: Yes  Week 1 - Initial Assessment     Do you have all of your prescriptions and are they filled?: Yes  Barriers to medication adherence: None  How often do you have trouble taking your medications the way you have been told to take them?: I always take them as prescribed. Do you have Home O2 Therapy?: Yes      Ability to seek help/take action for Emergent Urgent situations i.e. fire, crime, inclement weather or health crisis. : Independent  Ability to ambulate to restroom: Independent  Ability handle personal hygeine needs (bathing/dressing/grooming): Independent  Ability to manage Medications: Independent  Ability to prepare Food Preparation: Independent  Ability to maintain home (clean home, laundry):  Independent  Ability to drive and/or has transportation: Independent  Ability to do shopping: Independent  Ability to manage finances: Independent  Is patient able to live independently?: Yes     Current Housing: Private Residence        Per the Fall Risk Screening, did the patient have 2 or more falls or 1 fall with injury in the past year?: No        Are you experiencing loss of meaning?: No  Are you experiencing loss of hope and peace?: No     Thinking about your patient's physical health needs, are there any symptoms or problems (risk indicators) you are unsure about that require further investigation?: No identified areas of uncertainly or problems already being investigated   Are the patients physical health problems impacting on their mental well-being?: Mild impact on mental well-being e.g. \"\"feeling fed-up\"\", \"\"reduced enjoyment\"\"   Are there any problems with your patients lifestyle behaviors (alcohol, drugs, diet, exercise) that are impacting on physical or mental well-being?: No identified areas of concern   Do you have any other concerns about your patients mental well-being?  How would you rate their severity and impact on the patient?: No identified areas of concern   How would you rate their home environment in terms of safety and stability (including domestic violence, insecure housing, neighbor harassment)?: Consistently safe, supportive, stable, no identified problems   How do daily activities impact on the patient's well-being? (include current or anticipated unemployment, work, caregiving, access to transportation or other): Some general dissatisfaction but no concern   How would you rate their social network (family, work, friends)?: Adequate participation with social networks   How would you rate their financial resources (including ability to afford all required medical care)?: Financially secure, resources adequate, no identified problems   How wells does the patient now understand their health and well-being (symptoms, signs or risk factors) and what they need to do to manage their health?: Reasonable to good understanding and already engages in managing health or is willing to undertake better management   How well do you think your patient can engage in healthcare discussions? (Barriers include language, deafness, aphasia, alcohol or drug problems, learning difficulties, concentration): Clear and open communication, no identified barriers   Do other services need to be involved to help this patient?: Other care/services not required at this time   Are current services involved with this patient well-coordinated? (Include coordination with other services you are now recommendation): All required care/services in place and well-coordinated   Suggested Interventions and Community Resources   Physical Therapy: Completed   Transportation Services: Declined   Zone Management Tools: In Process                  Prior to Admission medications    Medication Sig Start Date End Date Taking? Authorizing Provider   sodium bicarbonate 650 MG tablet Take 1 tablet by mouth 2 times daily 1/6/22   Jh Browne MD   tiZANidine (ZANAFLEX) 4 MG tablet Take 1 tablet by mouth every 6 hours as needed (spasm) 12/28/21   Mayr Herbert MD   Insulin Pen Needle ([employer] PEN NEEDLES) 31G X 6 MM MISC 1 each by Does not apply route 5 times daily 12/23/21 3/23/22  AFSANEH Thomas CNP   HUMALOG 100 UNIT/ML injection vial  11/29/21   Historical Provider, MD   insulin glargine (BASAGLAR KWIKPEN) 100 UNIT/ML injection pen Inject 73 Units into the skin 2 times daily 11/30/21   AFSANEH Thomas CNP   blood glucose test strips (TRUE METRIX BLOOD GLUCOSE TEST) strip 1 each by In Vitro route daily As needed.  11/30/21   AFSANEH Thomas CNP   Blood Glucose Monitoring Suppl (TRUE METRIX GO GLUCOSE METER) w/Device KIT 1 each by Does not apply route 4 times daily 11/30/21   AFSANEH Thomas CNP   Lancet Device MISC 1 Device by Does not apply route once for 1 dose 11/30/21 11/30/21  AFSANEH Thomas CNP   Lancets MISC 1 each by Does not apply route daily 11/30/21   Shannon Last, APRN - CNP   insulin aspart (NOVOLOG FLEXPEN) 100 UNIT/ML injection pen Sliding scale three times daily with meals as follows:  Glucose <139  No Insulin; 140-199  3 Units; 200-249  6 Units; 250-299  9 Units; 300-349  12 Units; 350-400  15 Units; Above 400  18 Units 11/23/21   Shannon Last, APRN - CNP   ELIQUIS 5 MG TABS tablet Take 1 tablet by mouth 2 times daily 11/22/21   Kacy Hayes MD   gabapentin (NEURONTIN) 300 MG capsule Take 1 capsule by mouth 2 times daily for 30 days.  11/22/21 12/24/21  Kacy Hayes MD   traZODone (DESYREL) 50 MG tablet Take 1.5 tablets by mouth nightly 11/22/21   Kacy Hayes MD   furosemide (LASIX) 80 MG tablet Take 1 tablet by mouth 2 times daily 11/22/21   Kacy Hayes MD   febuxostat (ULORIC) 40 MG TABS tablet Take 1 tablet by mouth daily 11/22/21   Kacy Hayes MD   docusate sodium (COLACE) 100 MG capsule Take 1 capsule by mouth 2 times daily 11/22/21   Kacy Hayes MD   tamsulosin [facility]) 0.4 MG capsule Take 1 capsule by mouth daily 11/23/21   Kacy Hayes MD   atorvastatin (LIPITOR) 80 MG tablet Take 1 tablet by mouth daily 11/3/21   Shannon Last, APRN - CNP   aspirin 81 MG chewable tablet Take 1 tablet by mouth daily 9/25/21   Emmanuel Gruber MD   ranolazine (RANEXA) 1000 MG extended release tablet Take 1 tablet by mouth 2 times daily 9/25/21   Emmanuel Gruber MD   busPIRone (BUSPAR) 7.5 MG tablet Take 1 tablet by mouth 3 times daily 9/25/21   Emmanuel Gruber MD   carvedilol (COREG) 6.25 MG tablet Take 1 tablet by mouth 2 times daily 9/25/21   Emmanuel Gruber MD   pantoprazole (PROTONIX) 40 MG tablet Take 1 tablet by mouth every morning (before breakfast) 9/25/21   Emmanuel Gruber MD   senna (SENOKOT) 8.6 MG tablet Take 2 tablets by mouth daily as needed (Constipation) 8/25/21   Emmanuel Gruber MD   allopurinol (ZYLOPRIM) 100 MG tablet Take 1 tablet by mouth daily 4/14/21   Shannon Shrestha, APRN - CNP       Future Appointments   Date Time Provider Balbir Palacios   2/3/2022  1:30 PM Raquel Bruno PT [facility] PT [facility]   2/10/2022  2:30 PM Tha Hastings MD [facility]   2/22/2022  1:00 PM Tia Huynh MD [address]   3/1/2022  2:30 PM Lissa Arroyo, APRN - CNP [facility]      and   General Assessment

## 2022-01-27 ENCOUNTER — CARE COORDINATION (OUTPATIENT)
Dept: CARE COORDINATION | Age: 62
End: 2022-01-27

## 2022-01-28 ENCOUNTER — HOSPITAL ENCOUNTER (OUTPATIENT)
Age: 62
Setting detail: SPECIMEN
Discharge: HOME | End: 2022-01-28

## 2022-01-28 DIAGNOSIS — R05.9 COUGH: ICD-10-CM

## 2022-01-29 LAB
SARS-COV-2: ABNORMAL
SARS-COV-2: DETECTED
SOURCE: ABNORMAL

## 2022-01-31 ENCOUNTER — CARE COORDINATION (OUTPATIENT)
Dept: CARE COORDINATION | Age: 62
End: 2022-01-31

## 2022-01-31 DIAGNOSIS — U07.1 COVID: ICD-10-CM

## 2022-01-31 RX ORDER — SODIUM CHLORIDE 9 MG/ML
INJECTION, SOLUTION INTRAVENOUS CONTINUOUS
Status: CANCELLED | OUTPATIENT
Start: 2022-02-01

## 2022-01-31 NOTE — TELEPHONE ENCOUNTER
Thank you I agree, she is certainly a high risk patient and should receive. This may have been some of the problem thus far.

## 2022-01-31 NOTE — TELEPHONE ENCOUNTER
We called around on the weekend and the order has been placed. She was not called by [facility] to be scheduled and [facility] told them over the weekend there are no antibodies available in the [facility] area. This was yesterday, I doubt this has changed recently. Can you please call [facility] to see what is available and why she has not been called yet. Thanks.

## 2022-01-31 NOTE — CARE COORDINATION
Ambulatory Care Coordination COVID Follow up Call  No ED visit just lab test for covid which was positive. Challenges to be reviewed by the provider   Additional needs identified to be addressed with provider: No  none                 Encounter was not routed to provider for escalation. Method of communication with provider: none    Discussed COVID-19 related testing which was: available at this time. Test results were: positive. Patient informed of results, if available? Yes. Current Symptoms: fever, fatigue, cough, loss of taste or smell, sweating, diarrhea, no new symptoms, no worsening symptoms and head ache    Reviewed New or Changed Meds: yes    Do you have what you need at home?  Durable Medical Equipment ordered at discharge: None   Home Health/Outpatient orders at discharge: none   Was patient discharged with a pulse oximeter? No, patient has one at home. Discussed and confirmed pulse oximeter discharge instructions and when to notify provider or seek emergency care. Patient education provided: Reviewed appropriate site of care based on symptoms and resources available to patient including: PCP and ACM. Follow up appointment recommended: yes. If no appointment scheduled, scheduling offered: yes  Future Appointments   Date Time Provider Balbir Palacios   2/3/2022  1:30 PM Zaynab Smith, PT [facility]   2/10/2022  2:30 PM [facility]   2/22/2022  1:00 PM MD ALONZO Xie   3/1/2022  2:30 PM [facility]       Interventions: Obtained and reviewed discharge summary and/or continuity of care documents  Reviewed discharge instructions, medical action plan and red flags with patient who verbalized understanding. Plan for follow-up call in 5-7 days based on severity of symptoms and risk factors. Plan for next call: symptom check  Provided contact information for future needs.   ACM instructed patient to continue to monitor symptoms, report concerns. Increase fluids and rest, take tylenol for body aches or fever unless contraindicated. Remember to eat healthy to maintain energy. Return to ED with any increased SOB, chest pain or sudden leg pain. COVID ZONE REVIEWED   Wash hands often with soap and water for at least 20 seconds or alternatively use hand  with at least 60% alcohol content  Cover coughs and sneezes  Wear a mask when around others if possible  Clean all \"high-touch\" surfaces every day, such as doorknobs and cellphones    Instructions on pulse oximeter given:  Normal blood oxygen level is between 93% to 100%. For patients like you with COVID-19, your oxygen level sometimes drops below 93%.  Please use the Pulse-Oximeter at home to check your blood oxygen level twice per day or anytime you have worsening shortness of breath.  Heres how you use the Pulse-Oximeter:    Place the Pulse-Oximeter on your index finger (remove any nail polish if present). -Direct the red light downwards towards your finger, on top of your fingernail.  -Hold your finger still while the machine reads your blood oxygen level.  If you notice that your blood oxygen level stays below 88% while being active OR stays below 90% while sitting or standing, PLEASE RETURN IMMEDIATELY TO THE EMERGENCY DEPARTMENT.  If you normally require home Oxygen (even before you got COVID-19) and you notice that you need more than 4 liters of Oxygen to keep your oxygen level above 88%, PLEASE RETURN IMMEDIATELY TO THE EMERGENCY DEPARTMENT.  If your shortness of breath is severe or getting worse, no matter what your oxygen level states, PLEASE RETURN IMMEDIATELY TO THE EMERGENCY DEPARTMENT.  If you have chest pain, fainting episodes, heart palpitations, or any other serious medical issue, PLEASE RETURN IMMEDIATELY TO THE EMERGENCY DEPARTMENT.       Leydi Pineda RN

## 2022-01-31 NOTE — CARE COORDINATION
ACALONSO spoke with Vicky Crump at [address] [place] [address] who stated that he will call patient to schedule the infusion. Patient notified.

## 2022-01-31 NOTE — TELEPHONE ENCOUNTER
Spoke with [facility] pharmacy (referred there by [facility]) and patient will be scheduled. He did say that she did not meet the age requirement or immuno compromised but I pointed out she has a very high admission risk and he agreed she should be scheduled due to multiple health concerns. Patient notified.

## 2022-02-01 ENCOUNTER — HOSPITAL ENCOUNTER (OUTPATIENT)
Dept: INFUSION THERAPY | Age: 62
Setting detail: INFUSION SERIES
Discharge: HOME | End: 2022-02-01
Attending: INTERNAL MEDICINE
Payer: COMMERCIAL

## 2022-02-01 VITALS
RESPIRATION RATE: 18 BRPM | HEART RATE: 60 BPM | OXYGEN SATURATION: 90 % | SYSTOLIC BLOOD PRESSURE: 139 MMHG | TEMPERATURE: 97.9 F | DIASTOLIC BLOOD PRESSURE: 60 MMHG

## 2022-02-01 DIAGNOSIS — U07.1 COVID-19: ICD-10-CM

## 2022-02-01 DIAGNOSIS — U07.1 COVID: Primary | ICD-10-CM

## 2022-02-01 PROCEDURE — 2580000003 HC RX 258: Performed by: NURSE PRACTITIONER

## 2022-02-01 PROCEDURE — 96365 THER/PROPH/DIAG IV INF INIT: CPT

## 2022-02-01 PROCEDURE — 6360000002 HC RX W HCPCS: Performed by: NURSE PRACTITIONER

## 2022-02-01 RX ORDER — SODIUM CHLORIDE 9 MG/ML
INJECTION, SOLUTION INTRAVENOUS CONTINUOUS
Status: DISCONTINUED | OUTPATIENT
Start: 2022-02-01 | End: 2022-02-02 | Stop reason: HOSPADM

## 2022-02-01 RX ORDER — SODIUM CHLORIDE 9 MG/ML
INJECTION, SOLUTION INTRAVENOUS CONTINUOUS
Status: CANCELLED | OUTPATIENT
Start: 2022-02-01

## 2022-02-01 RX ADMIN — Medication 500 MG: at 13:48

## 2022-02-01 RX ADMIN — SODIUM CHLORIDE: 9 INJECTION, SOLUTION INTRAVENOUS at 13:53

## 2022-02-01 NOTE — DISCHARGE SUMMARY
Patient here for Covid 19 Monoclonal antibody infusion. Patient observed for one hour post infusion. Patient tolerated well with vital signs as charted. Patient given discharge instructions and education. Patient has a pulse ox at home at this time. Education provided on its use.

## 2022-02-09 ENCOUNTER — CARE COORDINATION (OUTPATIENT)
Dept: CARE COORDINATION | Age: 62
End: 2022-02-09

## 2022-02-09 NOTE — CARE COORDINATION
Ambulatory Care Coordination Note  2/9/2022  CM Risk Score: 7  Charlson 10 Year Mortality Risk Score: 100%     ACC: Juan Manuel Hammer, RN    Summary Note: Spoke with patient as a follow up to covid. Patient reports that she did receive the Monoclonal Antibodies infusion. She does not think it helped her very much. She stated that she is still having weakness and a cough but is managing. She requested a refill of trazodone, ACM explained that a refill was sent on 2.7.22 to [facility]. Patient will pick it up but asked for [facility] to be removed from her chart, she now uses [facility]. Correction made in chart. Patient denied any additional needs today. ACM will follow up on symptoms, DM and CHF in 1 week. Care Coordination Interventions    Program Enrollment: Complex Care  Referral from Primary Care Provider: Yes  Suggested Interventions and Community Resources  Physical Therapy: Completed  Transportation Support: Declined  Zone Management Tools: In Process         Goals Addressed                 This Visit's Progress     Conditions and Symptoms   On track     I will schedule office visits, as directed by my provider. I will keep my appointment or reschedule if I have to cancel. I will notify my provider of any barriers to my plan of care. I will follow my Zone Management tool to seek urgent or emergent care. I will notify my provider of any symptoms that indicate a worsening of my condition. Barriers: overwhelmed by complexity of regimen  Plan for overcoming my barriers: work with ACM   Confidence: 9/10  Anticipated Goal Completion Date: 4.26.22              Prior to Admission medications    Medication Sig Start Date End Date Taking?  Authorizing Provider   traZODone (DESYREL) 50 MG tablet Take 1.5 tablets by mouth nightly 2/7/22   AFSANEH Noel - CNP   blood glucose test strips (TRUE METRIX BLOOD GLUCOSE TEST) strip 1 each by In Vitro route 3 times daily Uncontrolled diabetes 2/2/22 3/4/22  Shari Kebede ArnulfodAFSANEH CNP   benzonatate (TESSALON) 100 MG capsule Take 1 capsule by mouth 3 times daily as needed for Cough 2/2/22 3/4/22  AFSANEH Moe CNP   albuterol sulfate HFA (PROVENTIL HFA) 108 (90 Base) MCG/ACT inhaler Inhale 2 puffs into the lungs every 6 hours as needed for Wheezing 2/2/22   AFSANEH Moe CNP   sodium bicarbonate 650 MG tablet Take 1 tablet by mouth 2 times daily 1/6/22   aJnell Tenorio MD   tiZANidine (ZANAFLEX) 4 MG tablet Take 1 tablet by mouth every 6 hours as needed (spasm) 12/28/21   Parviz Chu MD   Insulin Pen Needle ([facility] PEN NEEDLES) 31G X 6 MM MISC 1 each by Does not apply route 5 times daily 12/23/21 3/23/22  AFSANEH Moe CNP   HUMALOG 100 UNIT/ML injection vial  11/29/21   Historical Provider, MD   insulin glargine (BASAGLAR KWIKPEN) 100 UNIT/ML injection pen Inject 73 Units into the skin 2 times daily 11/30/21   AFSANEH Moe CNP   Blood Glucose Monitoring Suppl (TRUE METRIX GO GLUCOSE METER) w/Device KIT 1 each by Does not apply route 4 times daily 11/30/21   AFSANEH Moe - CNP   Lancet Device MISC 1 Device by Does not apply route once for 1 dose 11/30/21 11/30/21  AFSANEH Moe - CNP   Lancets MISC 1 each by Does not apply route daily 11/30/21   AFSANEH Moe CNP   insulin aspart (NOVOLOG FLEXPEN) 100 UNIT/ML injection pen Sliding scale three times daily with meals as follows:  Glucose <139  No Insulin; 140-199  3 Units; 200-249  6 Units; 250-299  9 Units; 300-349  12 Units; 350-400  15 Units; Above 400  18 Units 11/23/21   AFSANEH Moe CNP   ELIQUIS 5 MG TABS tablet Take 1 tablet by mouth 2 times daily 11/22/21   Freddy Otto MD   gabapentin (NEURONTIN) 300 MG capsule Take 1 capsule by mouth 2 times daily for 30 days.  11/22/21 12/24/21  Freddy Otto MD   furosemide (LASIX) 80 MG tablet Take 1 tablet by mouth 2 times daily 11/22/21   Freddy Otto MD   febuxostat (ULORIC) 40 MG TABS tablet Take 1 tablet by mouth daily 11/22/21   Jazmin Raza MD   docusate sodium (COLACE) 100 MG capsule Take 1 capsule by mouth 2 times daily 11/22/21   Jazmin Raza MD   tamsulosin [facility]) 0.4 MG capsule Take 1 capsule by mouth daily 11/23/21   Jazmin Raza MD   atorvastatin (LIPITOR) 80 MG tablet Take 1 tablet by mouth daily 11/3/21   AFSANEH Cason CNP   aspirin 81 MG chewable tablet Take 1 tablet by mouth daily 9/25/21   Marni Carroll MD   ranolazine (RANEXA) 1000 MG extended release tablet Take 1 tablet by mouth 2 times daily 9/25/21   Marni Carroll MD   busPIRone (BUSPAR) 7.5 MG tablet Take 1 tablet by mouth 3 times daily 9/25/21   Marni Carroll MD   carvedilol (COREG) 6.25 MG tablet Take 1 tablet by mouth 2 times daily 9/25/21   Marni Carroll MD   pantoprazole (PROTONIX) 40 MG tablet Take 1 tablet by mouth every morning (before breakfast) 9/25/21   Marni Carroll MD   senna (SENOKOT) 8.6 MG tablet Take 2 tablets by mouth daily as needed (Constipation) 8/25/21   Marni Carroll MD   allopurinol (ZYLOPRIM) 100 MG tablet Take 1 tablet by mouth daily 4/14/21   AFSANEH Cason CNP       Future Appointments   Date Time Provider Balbir Palacios   2/10/2022  2:30 PM Troy Castro MD heartvasc [facility]   2/15/2022  3:00 PM Evie Fish SY[facility]IL [facility] OT [facility]   2/15/2022  4:00 PM Chika Triplett PT [facility] PT [facility]   2/22/2022  1:00 PM Silas Love MD [facility]   3/1/2022  2:30 PM [facility]     ,   Diabetes Assessment    Meal Planning: Other       No patient-reported symptoms      ,   Congestive Heart Failure Assessment    Are you currently restricting fluids?: Other  Do you understand a low sodium diet?: Yes  Do you understand how to read food labels?: Yes     No patient-reported symptoms      Symptoms:     Salt intake watch compared to last visit: stable      and   General Assessment    Do you have any symptoms that are causing concern?: Yes  Progression since Onset: Gradually Improving  Reported Symptoms: Cough, Weakness

## 2022-02-10 ENCOUNTER — CARE COORDINATION (OUTPATIENT)
Dept: CARE COORDINATION | Age: 62
End: 2022-02-10

## 2022-02-10 ENCOUNTER — OFFICE VISIT (OUTPATIENT)
Dept: VASCULAR SURGERY | Age: 62
End: 2022-02-10

## 2022-02-10 VITALS
BODY MASS INDEX: 40.15 KG/M2 | TEMPERATURE: 98.1 F | OXYGEN SATURATION: 97 % | DIASTOLIC BLOOD PRESSURE: 61 MMHG | RESPIRATION RATE: 16 BRPM | WEIGHT: 241 LBS | SYSTOLIC BLOOD PRESSURE: 141 MMHG | HEART RATE: 93 BPM | HEIGHT: 65 IN

## 2022-02-10 DIAGNOSIS — Z98.890 S/P ARTERIOVENOUS (AV) FISTULA CREATION: Primary | ICD-10-CM

## 2022-02-10 PROCEDURE — 99024 POSTOP FOLLOW-UP VISIT: CPT | Performed by: SURGERY

## 2022-02-10 RX ORDER — LORATADINE 5 MG/5 ML
SOLUTION, ORAL ORAL
COMMUNITY
Start: 2022-01-28

## 2022-02-10 NOTE — PROGRESS NOTES
Division of Vascular Surgery        Postoperative Follow Up    Left radial cephalic AV fistula at [facility] fossa (12/14/21)    History of Present Illness:      Conchita Loyola is a 59 y.o. woman presents for postoperative follow up after undergoing Av fistula creation in left arm. She has done well, denies any pain, weakness, numbness/tingling to suggest ischemic steal.  She has excellent thrill over her fistula. Review of Systems:     Review of Systems   Constitutional: Negative for chills and fever. Respiratory: Negative for chest tightness and shortness of breath. Cardiovascular: Negative for chest pain and leg swelling. Skin: Negative for color change and wound. Neurological: Negative for weakness and numbness. Physical Exam:     Vitals:  BP (!) 141/61 (Site: Left Lower Arm, Position: Sitting, Cuff Size: Medium Adult)   Pulse 93   Temp 98.1 °F (36.7 °C) (Temporal)   Resp 16   Ht 5' 5\" (1.651 m) Comment: per patient  Wt 241 lb (109.3 kg)   SpO2 97%   BMI 40.10 kg/m²     Physical Exam  Cardiovascular:      Rate and Rhythm: Normal rate and regular rhythm. Pulses:           Radial pulses are 1+ on the left side. Arteriovenous access: left arteriovenous access is present. Comments: Good thrill over AV fistula  Pulmonary:      Effort: Pulmonary effort is normal. No respiratory distress. Chest:      Comments: Tunneled catheter inplace  Musculoskeletal:      Left upper arm: No swelling or tenderness. Left hand: No swelling. Normal strength. Normal sensation. Normal capillary refill. Right lower leg: No edema. Left lower leg: No edema. Skin:     General: Skin is warm. Capillary Refill: Capillary refill takes less than 2 seconds. Comments: Incision well healed   Neurological:      Mental Status: She is alert and oriented to person, place, and time. GCS: GCS eye subscore is 4. GCS verbal subscore is 5. GCS motor subscore is 6.       Sensory: Sensation is intact. Motor: Motor function is intact. Imaging/Labs:     Ultrasound performed int he office reveals 6-7 mm cephalic vein AV fistula, marked out for easier access    Assessment and Plan:     ESRD on hemodialysis, s/p AV fistula creation  · Ok to access Av fistula with 1 needle this week followed by 2 needles next week  · If no issues with cannulations she will return to have her tunneled catheter removed    Electronically signed by Tyrone Turcios MD on 2/10/22 at 3:07 PM [address],[facility] Floor North: [phone]  C: [phone]  Email: [email or web address]

## 2022-02-10 NOTE — CARE COORDINATION
ACM received VM from patient requesting a return call regarding a referral.   ACM called back and had to leave a return message.

## 2022-02-13 ASSESSMENT — ENCOUNTER SYMPTOMS
CHEST TIGHTNESS: 0
SHORTNESS OF BREATH: 0
COLOR CHANGE: 0

## 2022-02-14 ENCOUNTER — NURSE TRIAGE (OUTPATIENT)
Dept: OTHER | Facility: CLINIC | Age: 62
End: 2022-02-14

## 2022-02-14 ENCOUNTER — CARE COORDINATION (OUTPATIENT)
Dept: CARE COORDINATION | Age: 62
End: 2022-02-14

## 2022-02-14 PROBLEM — N18.4 STAGE 4 CHRONIC KIDNEY DISEASE (HCC): Status: RESOLVED | Noted: 2019-08-06 | Resolved: 2022-02-14

## 2022-02-14 PROBLEM — N18.6 END-STAGE RENAL DISEASE (HCC): Status: ACTIVE | Noted: 2022-02-14

## 2022-02-14 PROBLEM — D63.1 ANEMIA IN STAGE 4 CHRONIC KIDNEY DISEASE (HCC): Status: RESOLVED | Noted: 2021-05-03 | Resolved: 2022-02-14

## 2022-02-14 PROBLEM — U07.1 COVID-19: Status: RESOLVED | Noted: 2022-02-01 | Resolved: 2022-02-14

## 2022-02-14 PROBLEM — Z23 NEED FOR IMMUNIZATION AGAINST INFLUENZA: Status: RESOLVED | Noted: 2021-09-30 | Resolved: 2022-02-14

## 2022-02-14 PROBLEM — N18.4 ANEMIA IN STAGE 4 CHRONIC KIDNEY DISEASE (HCC): Status: RESOLVED | Noted: 2021-05-03 | Resolved: 2022-02-14

## 2022-02-14 PROBLEM — G93.41 METABOLIC ENCEPHALOPATHY: Status: RESOLVED | Noted: 2021-12-26 | Resolved: 2022-02-14

## 2022-02-14 PROBLEM — M54.6 ACUTE RIGHT-SIDED THORACIC BACK PAIN: Status: RESOLVED | Noted: 2021-12-24 | Resolved: 2022-02-14

## 2022-02-14 NOTE — CARE COORDINATION
Patient called stating that she is still having a cough from covid. She tested positive on 1.28.22. ACM explained that covid symptoms can last for an extended time. Patient stated at some point her blood sugar increased to 500 and she believes it was from her inhaler. She is taking 75 Units of Basaglar 2 x daily and Novolog on a sliding scale plus coverage. ACM will route concern to PCP, provided education on needing to report to the ED if her blood sugar ever goes this high again. Patient was encouraged to call and schedule a follow up with PCP regarding blood sugar and continued covid symptoms. ACM will follow up with recommendations.

## 2022-02-14 NOTE — TELEPHONE ENCOUNTER
Received call from [facility] at [facility] with [employer]. Subjective: Caller states blood sugar 324 at noon today,she was fasting. Normally  checks 4 times a day. 599 at present. + covid 2 weeks ago. Current Symptoms: Tired,cough, using albuterol inhaler for past week,blood sugar 599 at present     Onset: 7-10 days ago    Associated Symptoms: NA    Pain Severity: 0/10    Temperature: 99.4 today    What has been tried:     LMP: NA Pregnant: NA    Recommended disposition:Go to ER Now or second level triage. Care advice provided, patient verbalizes understanding; denies any other questions or concerns; instructed to call back for any new or worsening symptoms. Outcome: Unable to connect with [facility] for 2nd level triage. Pt. advised to go to ER Now. Spoke to pt. Sister and advised the same. Advised delaying treatment may lead to complications and worsen outcome. Attention Provider: Thank you for allowing me to participate in the care of your patient. The patient was connected to triage in response to information provided to the ECC/PSC. Please do not respond through this encounter as the response is not directed to a shared pool.     Reason for Disposition   Blood glucose > 500 mg/dL (27.8 mmol/L)    Protocols used: DIABETES - HIGH BLOOD SUGAR-ADULT-OH

## 2022-02-14 NOTE — TELEPHONE ENCOUNTER
Thanks for the update. I am not sure where she ended up. The triage nurse appears to have sent her to the ER, which I am not certain is appropriate in this case. She has been on levaquin and zpack, does not need more antibiotic. Unfortunately has ongoing covid illness symptoms.

## 2022-02-15 ENCOUNTER — CARE COORDINATION (OUTPATIENT)
Dept: CARE COORDINATION | Age: 62
End: 2022-02-15

## 2022-02-15 ENCOUNTER — CLINICAL DOCUMENTATION (OUTPATIENT)
Dept: PHYSICAL THERAPY | Age: 62
End: 2022-02-15

## 2022-02-15 NOTE — CARE COORDINATION
MARYAMM left VM with DME company requesting a return call to review orders for home O2 and verify that they have everything that they need.

## 2022-02-15 NOTE — CARE COORDINATION
ACM spoke with home monitoring at [facility] and explained that patient needs rescheduled for her home evaluation. Previous appt was cancelled due to covid. They stated they will call patient right away to reschedule. ACM will verify with patient that she is scheduled in 1 day.

## 2022-02-15 NOTE — PROGRESS NOTES
Per chart review saw patient had oxygen testing and had significant drop of SpO2. Pt at this time does not have supplemental O2. Confirmed with PCP office and they agreed with plan to cancel PT/OT evals for this date and reschedule for when patient has supplemental oxygen available.  Rescheduled for PT/OT evals on 3/1/22

## 2022-02-15 NOTE — CARE COORDINATION
MARYAM received VM from [employer] stating that they do not accept patients insurance. PARUL spoke with patient who called her insurance while on the phone with [facility] and was told that she can use :    [facility] [phone] FAX: [phone]  PARUL spoke with [facility] and was told to fax order, demo and office note. Patient updated.      [facility] HAS SCHEDULED EVALUATION 2.23.22

## 2022-02-16 ENCOUNTER — CARE COORDINATION (OUTPATIENT)
Dept: CARE COORDINATION | Age: 62
End: 2022-02-16

## 2022-02-16 ENCOUNTER — HOSPITAL ENCOUNTER (EMERGENCY)
Age: 62
Discharge: HOME | End: 2022-02-16
Attending: EMERGENCY MEDICINE
Payer: COMMERCIAL

## 2022-02-16 VITALS
OXYGEN SATURATION: 97 % | HEART RATE: 84 BPM | RESPIRATION RATE: 17 BRPM | DIASTOLIC BLOOD PRESSURE: 58 MMHG | TEMPERATURE: 97.2 F | SYSTOLIC BLOOD PRESSURE: 110 MMHG

## 2022-02-16 DIAGNOSIS — N18.6 ESRD (END STAGE RENAL DISEASE) (HCC): Primary | ICD-10-CM

## 2022-02-16 LAB
ABSOLUTE EOS #: 0.24 K/UL (ref 0–0.44)
ABSOLUTE IMMATURE GRANULOCYTE: <0.03 K/UL (ref 0–0.3)
ABSOLUTE LYMPH #: 1.28 K/UL (ref 1.1–3.7)
ABSOLUTE MONO #: 0.29 K/UL (ref 0.1–1.2)
ANION GAP SERPL CALCULATED.3IONS-SCNC: 16 MMOL/L (ref 9–17)
BASOPHILS # BLD: 1 % (ref 0–2)
BASOPHILS ABSOLUTE: 0.04 K/UL (ref 0–0.2)
BUN BLDV-MCNC: 36 MG/DL (ref 8–23)
BUN/CREAT BLD: ABNORMAL (ref 9–20)
CALCIUM IONIZED: 1.24 MMOL/L (ref 1.13–1.33)
CALCIUM SERPL-MCNC: 9.1 MG/DL (ref 8.6–10.4)
CHLORIDE BLD-SCNC: 103 MMOL/L (ref 98–107)
CO2: 18 MMOL/L (ref 20–31)
CREAT SERPL-MCNC: 2.91 MG/DL (ref 0.5–0.9)
DIFFERENTIAL TYPE: ABNORMAL
EOSINOPHILS RELATIVE PERCENT: 5 % (ref 1–4)
GFR AFRICAN AMERICAN: 20 ML/MIN
GFR NON-AFRICAN AMERICAN: 16 ML/MIN
GFR SERPL CREATININE-BSD FRML MDRD: ABNORMAL ML/MIN/{1.73_M2}
GFR SERPL CREATININE-BSD FRML MDRD: ABNORMAL ML/MIN/{1.73_M2}
GLUCOSE BLD-MCNC: 439 MG/DL (ref 70–99)
HCT VFR BLD CALC: 38.4 % (ref 36.3–47.1)
HEMOGLOBIN: 12.6 G/DL (ref 11.9–15.1)
IMMATURE GRANULOCYTES: 0 %
LYMPHOCYTES # BLD: 28 % (ref 24–43)
MAGNESIUM: 2.2 MG/DL (ref 1.6–2.6)
MCH RBC QN AUTO: 31.9 PG (ref 25.2–33.5)
MCHC RBC AUTO-ENTMCNC: 32.8 G/DL (ref 28.4–34.8)
MCV RBC AUTO: 97.2 FL (ref 82.6–102.9)
MONOCYTES # BLD: 6 % (ref 3–12)
NRBC AUTOMATED: 0 PER 100 WBC
PDW BLD-RTO: 14.8 % (ref 11.8–14.4)
PHOSPHORUS: 4.3 MG/DL (ref 2.6–4.5)
PLATELET # BLD: 164 K/UL (ref 138–453)
PLATELET ESTIMATE: ABNORMAL
PMV BLD AUTO: 11.2 FL (ref 8.1–13.5)
POTASSIUM SERPL-SCNC: 3.9 MMOL/L (ref 3.7–5.3)
RBC # BLD: 3.95 M/UL (ref 3.95–5.11)
RBC # BLD: ABNORMAL 10*6/UL
SARS-COV-2, RAPID: NOT DETECTED
SEG NEUTROPHILS: 60 % (ref 36–65)
SEGMENTED NEUTROPHILS ABSOLUTE COUNT: 2.77 K/UL (ref 1.5–8.1)
SODIUM BLD-SCNC: 137 MMOL/L (ref 135–144)
SPECIMEN DESCRIPTION: NORMAL
WBC # BLD: 4.6 K/UL (ref 3.5–11.3)
WBC # BLD: ABNORMAL 10*3/UL

## 2022-02-16 PROCEDURE — 84100 ASSAY OF PHOSPHORUS: CPT

## 2022-02-16 PROCEDURE — 87635 SARS-COV-2 COVID-19 AMP PRB: CPT

## 2022-02-16 PROCEDURE — 1200000000 HC SEMI PRIVATE

## 2022-02-16 PROCEDURE — 85025 COMPLETE CBC W/AUTO DIFF WBC: CPT

## 2022-02-16 PROCEDURE — 82330 ASSAY OF CALCIUM: CPT

## 2022-02-16 PROCEDURE — 99283 EMERGENCY DEPT VISIT LOW MDM: CPT

## 2022-02-16 PROCEDURE — 80048 BASIC METABOLIC PNL TOTAL CA: CPT

## 2022-02-16 PROCEDURE — 83735 ASSAY OF MAGNESIUM: CPT

## 2022-02-16 RX ORDER — ONDANSETRON 2 MG/ML
4 INJECTION INTRAMUSCULAR; INTRAVENOUS EVERY 4 HOURS PRN
Status: CANCELLED | OUTPATIENT
Start: 2022-02-16

## 2022-02-16 RX ORDER — ACETAMINOPHEN 650 MG/1
650 SUPPOSITORY RECTAL EVERY 6 HOURS PRN
Status: CANCELLED | OUTPATIENT
Start: 2022-02-16

## 2022-02-16 RX ORDER — SODIUM CHLORIDE 0.9 % (FLUSH) 0.9 %
5-40 SYRINGE (ML) INJECTION EVERY 12 HOURS SCHEDULED
Status: CANCELLED | OUTPATIENT
Start: 2022-02-16

## 2022-02-16 RX ORDER — TAMSULOSIN HYDROCHLORIDE 0.4 MG/1
0.4 CAPSULE ORAL DAILY
Status: CANCELLED | OUTPATIENT
Start: 2022-02-17

## 2022-02-16 RX ORDER — POTASSIUM CHLORIDE 7.45 MG/ML
10 INJECTION INTRAVENOUS PRN
Status: CANCELLED | OUTPATIENT
Start: 2022-02-16

## 2022-02-16 RX ORDER — ASPIRIN 81 MG/1
81 TABLET, CHEWABLE ORAL DAILY
Status: CANCELLED | OUTPATIENT
Start: 2022-02-17

## 2022-02-16 RX ORDER — RANOLAZINE 500 MG/1
1000 TABLET, EXTENDED RELEASE ORAL 2 TIMES DAILY
Status: CANCELLED | OUTPATIENT
Start: 2022-02-16

## 2022-02-16 RX ORDER — PANTOPRAZOLE SODIUM 40 MG/1
40 TABLET, DELAYED RELEASE ORAL
Status: CANCELLED | OUTPATIENT
Start: 2022-02-17

## 2022-02-16 RX ORDER — FUROSEMIDE 20 MG/1
80 TABLET ORAL 2 TIMES DAILY
Status: CANCELLED | OUTPATIENT
Start: 2022-02-16

## 2022-02-16 RX ORDER — CARVEDILOL 12.5 MG/1
6.25 TABLET ORAL 2 TIMES DAILY
Status: CANCELLED | OUTPATIENT
Start: 2022-02-16

## 2022-02-16 RX ORDER — SODIUM CHLORIDE 9 MG/ML
25 INJECTION, SOLUTION INTRAVENOUS PRN
Status: CANCELLED | OUTPATIENT
Start: 2022-02-16

## 2022-02-16 RX ORDER — ATORVASTATIN CALCIUM 80 MG/1
80 TABLET, FILM COATED ORAL DAILY
Status: CANCELLED | OUTPATIENT
Start: 2022-02-17

## 2022-02-16 RX ORDER — GABAPENTIN 300 MG/1
300 CAPSULE ORAL 2 TIMES DAILY
Status: CANCELLED | OUTPATIENT
Start: 2022-02-16

## 2022-02-16 RX ORDER — DOCUSATE SODIUM 100 MG/1
100 CAPSULE, LIQUID FILLED ORAL 2 TIMES DAILY
Status: CANCELLED | OUTPATIENT
Start: 2022-02-16

## 2022-02-16 RX ORDER — BUSPIRONE HYDROCHLORIDE 5 MG/1
7.5 TABLET ORAL 3 TIMES DAILY
Status: CANCELLED | OUTPATIENT
Start: 2022-02-16

## 2022-02-16 RX ORDER — ACETAMINOPHEN 325 MG/1
650 TABLET ORAL EVERY 6 HOURS PRN
Status: CANCELLED | OUTPATIENT
Start: 2022-02-16

## 2022-02-16 RX ORDER — SODIUM CHLORIDE 0.9 % (FLUSH) 0.9 %
5-40 SYRINGE (ML) INJECTION PRN
Status: CANCELLED | OUTPATIENT
Start: 2022-02-16

## 2022-02-16 RX ORDER — TIZANIDINE 4 MG/1
4 TABLET ORAL EVERY 6 HOURS PRN
Status: CANCELLED | OUTPATIENT
Start: 2022-02-16

## 2022-02-16 RX ORDER — SODIUM CHLORIDE 9 MG/ML
INJECTION, SOLUTION INTRAVENOUS CONTINUOUS
Status: CANCELLED | OUTPATIENT
Start: 2022-02-16

## 2022-02-16 RX ORDER — POTASSIUM CHLORIDE 20 MEQ/1
40 TABLET, EXTENDED RELEASE ORAL PRN
Status: CANCELLED | OUTPATIENT
Start: 2022-02-16

## 2022-02-16 RX ORDER — POLYETHYLENE GLYCOL 3350 17 G/17G
17 POWDER, FOR SOLUTION ORAL DAILY PRN
Status: CANCELLED | OUTPATIENT
Start: 2022-02-16

## 2022-02-16 RX ORDER — FEBUXOSTAT 40 MG/1
40 TABLET, FILM COATED ORAL DAILY
Status: CANCELLED | OUTPATIENT
Start: 2022-02-17

## 2022-02-16 RX ORDER — TRAZODONE HYDROCHLORIDE 50 MG/1
75 TABLET ORAL NIGHTLY
Status: CANCELLED | OUTPATIENT
Start: 2022-02-16

## 2022-02-16 ASSESSMENT — ENCOUNTER SYMPTOMS
SORE THROAT: 0
NAUSEA: 0
COUGH: 0
ABDOMINAL PAIN: 0
VOMITING: 0
SHORTNESS OF BREATH: 0
BACK PAIN: 0

## 2022-02-16 NOTE — ED PROVIDER NOTES
[address]  ED  Emergency Department Encounter  EmergencyMedicine Resident     Pt Name:Estefany Yu  MRN: [id number]  Deidragfjaylene 1960  Date of evaluation: 2/16/22  PCP:  Ponciano Shone, APRN - [place] [id number]       Chief Complaint   Patient presents with    Other       HISTORY OF PRESENT ILLNESS  (Location/Symptom, Timing/Onset, Context/Setting, Quality, Duration, Modifying Factors, Severity.)      Carlton Anthony is a 59 y.o. female who presents with need for routine dialysis. Patient goes to dialysis Monday Wednesday Friday. She usually went to start dialysis however due to insurance reasons was unable to go there. She has had difficulty finding a new location. She was instructed by her nephrologist, Dr. Muna Hall, to go to [facility] emergency department for dialysis today. She has no complaints she is at her baseline health. Does still make some urine. Has a right subclavian port. PAST MEDICAL / SURGICAL / SOCIAL / FAMILY HISTORY      has a past medical history of Backache, unspecified, CHF (congestive heart failure) ([address] Chronic kidney disease, Coronary atherosclerosis of artery bypass graft, COVID, Cramp of limb, Gallstones, Hypertension, Insomnia, Pneumonia, Type II or unspecified type diabetes mellitus with renal manifestations, not stated as uncontrolled(250.40), Type II or unspecified type diabetes mellitus without mention of complication, not stated as uncontrolled, and Unspecified vitamin D deficiency. has a past surgical history that includes Coronary artery bypass graft; Knee arthroscopy; Carpal tunnel release; Breast surgery; Tonsillectomy; Hand surgery; Ankle fracture surgery; Cholecystectomy, open (N/A); IR TUNNELED CVC PLACE WO SQ PORT/PUMP > 5 YEARS (8/18/2021); AV fistula creation (12/14/2021); and Dialysis fistula creation (Left, 12/14/2021).     Social History     Socioeconomic History    Marital status: Single     Spouse name: Not on file    Number of children: Not on file    Years of education: Not on file    Highest education level: Not on file   Occupational History    Not on file   Tobacco Use    Smoking status: Never Smoker    Smokeless tobacco: Never Used   Vaping Use    Vaping Use: Never used   Substance and Sexual Activity    Alcohol use: No    Drug use: No    Sexual activity: Not Currently   Other Topics Concern    Not on file   Social History Narrative    Not on file     Social Determinants of Health     Financial Resource Strain: Low Risk     Difficulty of Paying Living Expenses: Not hard at all   Food Insecurity:     Worried About [address] in the Last Year: Not on file    [address] [facility] [address] in the Last Year: Not on file   Transportation Needs:     Lack of Transportation (Medical): Not on file    Lack of Transportation (Non-Medical):  Not on file   Physical Activity:     Days of Exercise per Week: Not on file    Minutes of Exercise per Session: Not on file   Stress:     Feeling of Stress : Not on file   Social Connections:     Frequency of Communication with Friends and Family: Not on file    Frequency of Social Gatherings with Friends and Family: Not on file    Attends Temple Services: Not on file    Active Member of [address] [place] [address] or Organizations: Not on file    Attends Club or Organization Meetings: Not on file    Marital Status: Not on file   Intimate Partner Violence:     Fear of Current or Ex-Partner: Not on file    Emotionally Abused: Not on file    Physically Abused: Not on file    Sexually Abused: Not on file   Housing Stability:     Unable to Pay for Housing in the Last Year: Not on file    Number of [place] in the Last Year: Not on file    Unstable Housing in the Last Year: Not on file       Family History   Problem Relation Age of Onset    Diabetes Father     Heart Failure Father        Allergies:  Adhesive tape, Ace inhibitors, Iv dye [iodides], and Metformin and related    Home Medications:  Prior to Admission medications    Medication Sig Start Date End Date Taking? Authorizing Provider   tiZANidine (ZANAFLEX) 4 MG tablet Take 1 tablet by mouth every 6 hours as needed (spasm) 2/15/22 3/17/22 Yes Myrl Agent, APRN - CNP   Probiotic Acidophilus [facility]) TABS Take 1 tablet by mouth daily 2/14/22 3/16/22 Yes Myrl Agent, APRN - CNP   ReliOn Lancets Micro-Thin 33G MISC USE AS DIRECTED EVERY DAY 1/28/22  Yes Historical Provider, MD   traZODone (DESYREL) 50 MG tablet Take 1.5 tablets by mouth nightly 2/7/22  Yes Myrl Agent, APRN - CNP   blood glucose test strips (TRUE METRIX BLOOD GLUCOSE TEST) strip 1 each by In Vitro route 3 times daily Uncontrolled diabetes 2/2/22 3/4/22 Yes Myrl Agent, APRN - CNP   benzonatate (TESSALON) 100 MG capsule Take 1 capsule by mouth 3 times daily as needed for Cough 2/2/22 3/4/22 Yes Myrl Agent, APRN - CNP   sodium bicarbonate 650 MG tablet Take 1 tablet by mouth 2 times daily 1/6/22  Yes Celso Garcia MD   Insulin Pen Needle (Mary Jane Andersene PEN NEEDLES) 31G X 6 MM MISC 1 each by Does not apply route 5 times daily 12/23/21 3/23/22 Yes Myrl Agent, APRN - CNP   HUMALOG 100 UNIT/ML injection vial  11/29/21  Yes Historical Provider, MD   insulin glargine (BASAGLAR KWIKPEN) 100 UNIT/ML injection pen Inject 73 Units into the skin 2 times daily 11/30/21  Yes Myrl Agent, APRN - CNP   Blood Glucose Monitoring Suppl (TRUE METRIX GO GLUCOSE METER) w/Device KIT 1 each by Does not apply route 4 times daily 11/30/21  Yes Myrl Agent, APRN - CNP   Lancets MISC 1 each by Does not apply route daily 11/30/21  Yes Myrl Agent, APRN - CNP   insulin aspart (NOVOLOG FLEXPEN) 100 UNIT/ML injection pen Sliding scale three times daily with meals as follows:  Glucose <139  No Insulin; 140-199  3 Units; 200-249  6 Units; 250-299  9 Units; 300-349  12 Units; 350-400  15 Units;  Above 400  18 Units 11/23/21  Yes Myrl Agent, APRN - CNP   ELIQUIS 5 MG TABS tablet Take 1 tablet by mouth 2 times daily 11/22/21  Yes Mili Strange MD   furosemide (LASIX) 80 MG tablet Take 1 tablet by mouth 2 times daily 11/22/21  Yes Mili Strange MD   febuxostat (ULORIC) 40 MG TABS tablet Take 1 tablet by mouth daily 11/22/21  Yes Mili Strange MD   docusate sodium (COLACE) 100 MG capsule Take 1 capsule by mouth 2 times daily 11/22/21  Yes Mili Strange MD   tamsulosin [facility]) 0.4 MG capsule Take 1 capsule by mouth daily 11/23/21  Yes Mili Strange MD   atorvastatin (LIPITOR) 80 MG tablet Take 1 tablet by mouth daily 11/3/21  Yes AFSANEH Krishna CNP   aspirin 81 MG chewable tablet Take 1 tablet by mouth daily 9/25/21  Yes Johnathan Shrestha MD   ranolazine (RANEXA) 1000 MG extended release tablet Take 1 tablet by mouth 2 times daily 9/25/21  Yes Johnathan Shrestha MD   busPIRone (BUSPAR) 7.5 MG tablet Take 1 tablet by mouth 3 times daily 9/25/21  Yes Johnathan Shrestha MD   carvedilol (COREG) 6.25 MG tablet Take 1 tablet by mouth 2 times daily 9/25/21  Yes Johnathan Shrestha MD   pantoprazole (PROTONIX) 40 MG tablet Take 1 tablet by mouth every morning (before breakfast) 9/25/21  Yes Johnathan Shrestha MD   senna (SENOKOT) 8.6 MG tablet Take 2 tablets by mouth daily as needed (Constipation) 8/25/21  Yes Johnathan Shrestha MD   Lancet Device MISC 1 Device by Does not apply route once for 1 dose 11/30/21 11/30/21  AFSANEH Krishna CNP   gabapentin (NEURONTIN) 300 MG capsule Take 1 capsule by mouth 2 times daily for 30 days. 11/22/21 12/24/21  Mili Strange MD   allopurinol (ZYLOPRIM) 100 MG tablet Take 1 tablet by mouth daily 4/14/21   AFSANEH Krishna CNP       REVIEW OF SYSTEMS    (2-9 systems for level 4, 10 or more for level 5)      Review of Systems   Constitutional: Negative for chills and fever. HENT: Negative for congestion and sore throat. Respiratory: Negative for cough and shortness of breath. Cardiovascular: Negative for chest pain.    Gastrointestinal: Negative for abdominal pain, nausea and vomiting. Genitourinary: Negative for dysuria and frequency. Musculoskeletal: Negative for back pain and neck stiffness. Skin: Negative for rash. PHYSICAL EXAM   (up to 7 for level 4, 8 or more for level 5)      INITIAL VITALS:   BP (!) 110/58   Pulse 84   Temp 97.2 °F (36.2 °C)   Resp 17   SpO2 97%      Vitals:    02/16/22 1537   BP: (!) 110/58   Pulse: 84   Resp: 17   Temp: 97.2 °F (36.2 °C)   SpO2: 97%        Physical Exam  Vitals reviewed. Constitutional:       General: She is not in acute distress. Appearance: She is well-developed. She is obese. She is not ill-appearing, toxic-appearing or diaphoretic. HENT:      Head: Normocephalic and atraumatic. Eyes:      Extraocular Movements: Extraocular movements intact. Pupils: Pupils are equal, round, and reactive to light. Cardiovascular:      Rate and Rhythm: Normal rate and regular rhythm. Pulmonary:      Effort: Pulmonary effort is normal.      Breath sounds: Normal breath sounds. Abdominal:      General: There is no distension. Palpations: Abdomen is soft. Tenderness: There is no abdominal tenderness. Musculoskeletal:         General: Normal range of motion. Cervical back: Normal range of motion and neck supple. Skin:     General: Skin is warm and dry. Neurological:      General: No focal deficit present. Mental Status: She is alert and oriented to person, place, and time. DIFFERENTIAL  DIAGNOSIS     PLAN (LABS / IMAGING / EKG):  Orders Placed This Encounter   Procedures    COVID-19, Rapid    CBC with Auto Differential    Basic Metabolic Panel    Phosphorus    Calcium, Ionized    Magnesium    Inpatient consult to Nephrology    ADMIT TO INPATIENT       MEDICATIONS ORDERED:  No orders of the defined types were placed in this encounter.       DIAGNOSTIC RESULTS / EMERGENCY DEPARTMENT COURSE / MDM   LAB RESULTS:  Results for orders placed or performed during the hospital encounter of 02/16/22   COVID-19, Rapid    Specimen: Nasopharyngeal Swab   Result Value Ref Range    Specimen Description . NASOPHARYNGEAL SWAB     SARS-CoV-2, Rapid Not Detected Not Detected   CBC with Auto Differential   Result Value Ref Range    WBC 4.6 3.5 - 11.3 k/uL    RBC 3.95 3.95 - 5.11 m/uL    Hemoglobin 12.6 11.9 - 15.1 g/dL    Hematocrit 38.4 36.3 - 47.1 %    MCV 97.2 82.6 - 102.9 fL    MCH 31.9 25.2 - 33.5 pg    MCHC 32.8 28.4 - 34.8 g/dL    RDW 14.8 (H) 11.8 - 14.4 %    Platelets 066 686 - 957 k/uL    MPV 11.2 8.1 - 13.5 fL    NRBC Automated 0.0 0.0 per 100 WBC    Differential Type NOT REPORTED     Seg Neutrophils 60 36 - 65 %    Lymphocytes 28 24 - 43 %    Monocytes 6 3 - 12 %    Eosinophils % 5 (H) 1 - 4 %    Basophils 1 0 - 2 %    Immature Granulocytes 0 0 %    Segs Absolute 2.77 1.50 - 8.10 k/uL    Absolute Lymph # 1.28 1.10 - 3.70 k/uL    Absolute Mono # 0.29 0.10 - 1.20 k/uL    Absolute Eos # 0.24 0.00 - 0.44 k/uL    Basophils Absolute 0.04 0.00 - 0.20 k/uL    Absolute Immature Granulocyte <0.03 0.00 - 0.30 k/uL    WBC Morphology NOT REPORTED     RBC Morphology ANISOCYTOSIS PRESENT     Platelet Estimate NOT REPORTED    Basic Metabolic Panel   Result Value Ref Range    Glucose 439 (HH) 70 - 99 mg/dL    BUN 36 (H) 8 - 23 mg/dL    CREATININE 2.91 (H) 0.50 - 0.90 mg/dL    Bun/Cre Ratio NOT REPORTED 9 - 20    Calcium 9.1 8.6 - 10.4 mg/dL    Sodium 137 135 - 144 mmol/L    Potassium 3.9 3.7 - 5.3 mmol/L    Chloride 103 98 - 107 mmol/L    CO2 18 (L) 20 - 31 mmol/L    Anion Gap 16 9 - 17 mmol/L    GFR Non-African American 16 (L) >60 mL/min    GFR African American 20 (L) >60 mL/min    GFR Comment          GFR Staging NOT REPORTED    Phosphorus   Result Value Ref Range    Phosphorus 4.3 2.6 - 4.5 mg/dL   Calcium, Ionized   Result Value Ref Range    Calcium, Ion 1.24 1.13 - 1.33 mmol/L   Magnesium   Result Value Ref Range    Magnesium 2.2 1.6 - 2.6 mg/dL         RADIOLOGY:  No orders to display EMERGENCY DEPARTMENT COURSE & MDM:    Patient here requesting dialysis. Has missed approximately a week of dialysis. Appears well nontoxic does not appear fluid overloaded not requiring any oxygen, CBC BMP unremarkable. Contacted Dr. David Lenz, nephrology who requested patient be admitted to the observation unit and he will dialyze her tomorrow. Contacted  to ensure the observation unit is covered on her insurance and to help get her set up with outpatient dialysis. Patient however is requesting discharge as she cannot afford a hospital stay. Give strict return precautions for chest pain, shortness of breath, weakness, new worsening concerning symptoms. ED Course as of 02/16/22 2331   Wed Feb 16, 2022   1858  consulted. [CS]   1945 Glucose(!!): 439  10 SC insulin ordered [CS]      ED Course User Index  [CS] Viv Carolina DO         PROCEDURES:      CONSULTS:  IP CONSULT TO NEPHROLOGY    CRITICAL CARE:  Please see attending note    FINAL IMPRESSION      1. ESRD (end stage renal disease) ([address]          DISPOSITION / PLAN     DISPOSITION Decision To Discharge 02/16/2022 09:34:14 PM      PATIENT REFERRED TO:  No follow-up provider specified.     DISCHARGE MEDICATIONS:  Discharge Medication List as of 2/16/2022  9:35 PM          Viv Carolina DO  Emergency Medicine Resident    (Please note that portions of thisnote were completed with a voice recognition program.  Efforts were made to edit the dictations but occasionally words are mis-transcribed.)        Viv Carolina DO  Resident  02/16/22 9418

## 2022-02-16 NOTE — ED PROVIDER NOTES
[facility]     Emergency Department     Faculty Note/ Attestation      Pt Name: Carlos Bermudez                                       MRN: [id number]  Deidragfjaylene 1960  Date of evaluation: 2/16/2022    Patients PCP:    AFSANEH Floyd - CNP      Attestation  I performed a history and physical examination of the patient and discussed management with the resident. I reviewed the residents note and agree with the documented findings and plan of care. Any areas of disagreement are noted on the chart. I was personally present for the key portions of any procedures. I have documented in the chart those procedures where I was not present during the key portions. I have reviewed the emergency nurses triage note. I agree with the chief complaint, past medical history, past surgical history, allergies, medications, social and family history as documented unless otherwise noted below. For Physician Assistant/ Nurse Practitioner cases/documentation I have personally evaluated this patient and have completed at least one if not all key elements of the E/M (history, physical exam, and MDM). Additional findings are as noted. Initial Screens:             Vitals:    Vitals:    02/16/22 1537   BP: (!) 110/58   Pulse: 84   Resp: 17   Temp: 97.2 °F (36.2 °C)   SpO2: 97%       CHIEF COMPLAINT       Chief Complaint   Patient presents with    Other             DIAGNOSTIC RESULTS             RADIOLOGY:   No orders to display         LABS:  Labs Reviewed - No data to display      EMERGENCY DEPARTMENT COURSE:     -------------------------  BP: (!) 110/58, Temp: 97.2 °F (36.2 °C), Pulse: 84, Resp: 17      Comments    Patient has been on dialysis since August, she is a difficulty getting into a outpatient dialysis center with insurance issues, she is on the phone all day try to get in touch with [facility] dialysis and came here.   She had a fistula attempt on the left, does have a right chest port that has been working for her. Last dialysis has been on Friday. She has no complaints, no concerns right now just wants to get dialyzed. 7:22 PM EST  Patient is not known to our nephrologist, she will need to be placed in observation for consultation in the morning and care coordination.   Will get labs and ensure she has no emergent need for dialysis    (Please note that portions of this note were completed with a voice recognition program.  Efforts were made to edit the dictations but occasionally words are mis-transcribed.)      Gwendolyn Ivory MD,, MD  Attending Emergency Physician         Gwendolyn Ivory MD  02/16/22 1149       Gwendolyn Ivory MD  02/16/22 5643

## 2022-02-16 NOTE — CARE COORDINATION
ACM followed up with [facility] regarding home O2 and was told that oxygen has already been delivered to patient. ACM spoke with patient she verified that she was given instructions on use. ACM called and cancelled home monitoring with [facility]. Will updated PCP and follow up in 5-7 days with patient.

## 2022-02-16 NOTE — ED NOTES
The following labs labeled with pt sticker and tubed to lab:     [] Blue     [x] Lavender   [] on ice  [x] Green/yellow  [] Green/black [] on ice  [] Yellow  [] Red  [] Pink      [x] COVID-19 swab    [x] Rapid  [] PCR  [] Flu swab  [] Peds Viral Panel     [] Urine Sample  [] Pelvic Cultures  [] Blood Cultures            Zachariah Randolph RN  02/16/22 2891

## 2022-02-16 NOTE — ED NOTES
Pt is resting in bed, no complaints at this time, waiting for HD due to change in insurance.      Glenys Abad, RN  02/16/22 9426

## 2022-02-17 NOTE — ED NOTES
Lab called to confirm that lab tubes collected at 1807 were running.  states that labs are going to be run now.      Cecelia Stein RN  02/16/22 6184

## 2022-02-17 NOTE — CARE COORDINATION
Case Management Initial Discharge Plan  Maria Elena Pinedo,             Met with:patient to discuss discharge plans. Information verified: address, contacts, phone number, , insurance Yes  Insurance Provider: Moreno Sawyer    Emergency Contact/Next of Kin name & number: Sisters as per face sheet verified  Who are involved in patient's support system? sister    PCP: Arcenio West, APRN - CNP  Date of last visit: seen yesterday and her PCP told her to come to the ED      Discharge Planning    Living Arrangements:    lives alone    Home has 1 stories  ramp to get into front door, no stairs to climb to reach second floor  Location of bedroom/bathroom in home main    Patient able to perform ADL's:Independent    Current Services (outpatient & in home) DME  DME equipment: Walker, O2 2L along with concentrator, grab bars, shower chair, glucometer  DME provider:     Is patient receiving oral anticoagulation therapy? No    If indicated:   Physician managing anticoagulation treatment: na  Where does patient obtain lab work for ATC treatment? na      Potential Assistance Needed:       Patient agreeable to home care: No  [place] of choice provided:  n/a    Prior SNF/Rehab Placement and Facility: has been in [facility]  Agreeable to SNF/Rehab: No  [place] of choice provided: n/a     Evaluation: n/a, Jaki Morgan from Dialysis notified (ML pt will need set up for outpatient dialysis facility as pts insurance does not cover [address] on [place] and pt missed her m-w dialysis, she usually gets M/W/F dialysis since 2021    Expected Discharge date:       Patient expects to be discharged to:        If home: is the family and/or caregiver wiling & able to provide support at home? yes  Who will be providing this support? sister*    Follow Up Appointment: Best Day/ Time:      Transportation provider:   Transportation arrangements needed for discharge: No    Readmission Risk              Risk of Unplanned Readmission:  0             Does patient have a readmission risk score greater than 14?: No  If yes, follow-up appointment must be made within 7 days of discharge. Goals of Care: self care      Educated pt on transitional options, provided freedom of choice and are agreeable with plan      Discharge Plan: goal is home independent, has DME, dialysis m/w/f, pt missed m-w due to her insurance \"not covering\", ML for Penelope Henry in dialysis to assist in establishing new dialysis facility.  Per resident pt is to have dialysis tomorrow          Electronically signed by Trell Pacheco RN on 2/16/22 at 7:09 PM EST

## 2022-02-21 ENCOUNTER — CARE COORDINATION (OUTPATIENT)
Dept: CARE COORDINATION | Age: 62
End: 2022-02-21

## 2022-02-21 SDOH — ECONOMIC STABILITY: TRANSPORTATION INSECURITY
IN THE PAST 12 MONTHS, HAS THE LACK OF TRANSPORTATION KEPT YOU FROM MEDICAL APPOINTMENTS OR FROM GETTING MEDICATIONS?: NO

## 2022-02-21 SDOH — ECONOMIC STABILITY: TRANSPORTATION INSECURITY
IN THE PAST 12 MONTHS, HAS LACK OF TRANSPORTATION KEPT YOU FROM MEETINGS, WORK, OR FROM GETTING THINGS NEEDED FOR DAILY LIVING?: NO

## 2022-02-21 SDOH — ECONOMIC STABILITY: FOOD INSECURITY: WITHIN THE PAST 12 MONTHS, YOU WORRIED THAT YOUR FOOD WOULD RUN OUT BEFORE YOU GOT MONEY TO BUY MORE.: NEVER TRUE

## 2022-02-21 SDOH — ECONOMIC STABILITY: HOUSING INSECURITY
IN THE LAST 12 MONTHS, WAS THERE A TIME WHEN YOU DID NOT HAVE A STEADY PLACE TO SLEEP OR SLEPT IN A SHELTER (INCLUDING NOW)?: NO

## 2022-02-21 SDOH — HEALTH STABILITY: PHYSICAL HEALTH: ON AVERAGE, HOW MANY DAYS PER WEEK DO YOU ENGAGE IN MODERATE TO STRENUOUS EXERCISE (LIKE A BRISK WALK)?: 0 DAYS

## 2022-02-21 SDOH — HEALTH STABILITY: PHYSICAL HEALTH: ON AVERAGE, HOW MANY MINUTES DO YOU ENGAGE IN EXERCISE AT THIS LEVEL?: 0 MIN

## 2022-02-21 SDOH — ECONOMIC STABILITY: INCOME INSECURITY: IN THE LAST 12 MONTHS, WAS THERE A TIME WHEN YOU WERE NOT ABLE TO PAY THE MORTGAGE OR RENT ON TIME?: NO

## 2022-02-21 SDOH — ECONOMIC STABILITY: FOOD INSECURITY: WITHIN THE PAST 12 MONTHS, THE FOOD YOU BOUGHT JUST DIDN'T LAST AND YOU DIDN'T HAVE MONEY TO GET MORE.: NEVER TRUE

## 2022-02-21 SDOH — ECONOMIC STABILITY: HOUSING INSECURITY: IN THE LAST 12 MONTHS, HOW MANY PLACES HAVE YOU LIVED?: 1

## 2022-02-21 ASSESSMENT — SOCIAL DETERMINANTS OF HEALTH (SDOH)
ARE YOU MARRIED, WIDOWED, DIVORCED, SEPARATED, NEVER MARRIED, OR LIVING WITH A PARTNER?: NEVER MARRIED
DO YOU BELONG TO ANY CLUBS OR ORGANIZATIONS SUCH AS CHURCH GROUPS UNIONS, FRATERNAL OR ATHLETIC GROUPS, OR SCHOOL GROUPS?: NO
IN A TYPICAL WEEK, HOW MANY TIMES DO YOU TALK ON THE PHONE WITH FAMILY, FRIENDS, OR NEIGHBORS?: MORE THAN THREE TIMES A WEEK
HOW OFTEN DO YOU ATTENT MEETINGS OF THE CLUB OR ORGANIZATION YOU BELONG TO?: NEVER
HOW OFTEN DO YOU ATTEND CHURCH OR RELIGIOUS SERVICES?: NEVER
HOW OFTEN DO YOU GET TOGETHER WITH FRIENDS OR RELATIVES?: MORE THAN THREE TIMES A WEEK

## 2022-02-21 ASSESSMENT — LIFESTYLE VARIABLES: HOW OFTEN DO YOU HAVE A DRINK CONTAINING ALCOHOL: NEVER

## 2022-02-21 NOTE — CARE COORDINATION
Ambulatory Care Coordination Note  2/21/2022  CM Risk Score: 7  Charlson 10 Year Mortality Risk Score: 100%     ACC: JUANITA DOUGLAS RN  ACP paper work will be sent to patient at her request.    Summary Note:   Spoke with patient who stated she is going to have repeat lab work done and pending the results she will be scheduled with dialysis on [address].  She stated she went to ED to have dialysis due to insurance conflict but was told that her labs were stable, no dialysis was needed right then. Patient denied any needs today. ACM will follow up in 1 week to discuss needs. Care Coordination Interventions    Program Enrollment: Complex Care  Referral from Primary Care Provider: Yes  Suggested Interventions and Community Resources  Diabetes Education: In Process  Physical Therapy: Completed  Transportation Support: Declined  Zone Management Tools: In Process         Goals Addressed                 This Visit's Progress     Conditions and Symptoms   On track     I will schedule office visits, as directed by my provider. I will keep my appointment or reschedule if I have to cancel. I will notify my provider of any barriers to my plan of care. I will follow my Zone Management tool to seek urgent or emergent care. I will notify my provider of any symptoms that indicate a worsening of my condition. Barriers: overwhelmed by complexity of regimen  Plan for overcoming my barriers: work with ACM   Confidence: 9/10  Anticipated Goal Completion Date: 4.26.22              Prior to Admission medications    Medication Sig Start Date End Date Taking?  Authorizing Provider   tiZANidine (ZANAFLEX) 4 MG tablet Take 1 tablet by mouth every 6 hours as needed (spasm) 2/15/22 3/17/22  Deangelo Shanks APRN - CNP   Probiotic Acidophilus [facility]) TABS Take 1 tablet by mouth daily 2/14/22 3/16/22  AFSANEH Cason - CNP   ReliOn Lancets Micro-Thin 33G MISC USE AS DIRECTED EVERY DAY 1/28/22   Historical Provider, MD   traZODone (COLACE) 100 MG capsule Take 1 capsule by mouth 2 times daily 11/22/21   Carson Ly MD   tamsulosin [facility]) 0.4 MG capsule Take 1 capsule by mouth daily 11/23/21   Carson Ly MD   atorvastatin (LIPITOR) 80 MG tablet Take 1 tablet by mouth daily 11/3/21   AFSANEH Rosa CNP   aspirin 81 MG chewable tablet Take 1 tablet by mouth daily 9/25/21   Sonam Mullins MD   ranolazine (RANEXA) 1000 MG extended release tablet Take 1 tablet by mouth 2 times daily 9/25/21   Sonam Mullins MD   busPIRone (BUSPAR) 7.5 MG tablet Take 1 tablet by mouth 3 times daily 9/25/21   Sonam Mullins MD   carvedilol (COREG) 6.25 MG tablet Take 1 tablet by mouth 2 times daily 9/25/21   Sonam Mullins MD   pantoprazole (PROTONIX) 40 MG tablet Take 1 tablet by mouth every morning (before breakfast) 9/25/21   Sonam Mullins MD   senna (SENOKOT) 8.6 MG tablet Take 2 tablets by mouth daily as needed (Constipation) 8/25/21   Sonam Mullins MD   allopurinol (ZYLOPRIM) 100 MG tablet Take 1 tablet by mouth daily 4/14/21   AFSANEH Rosa CNP       Future Appointments   Date Time Provider Balbir Suzanne   2/22/2022  1:00 PM Jennifer Gilmore MD [address]   3/1/2022  2:30 PM AFSANEH Rosa - [address] [facility]   3/1/2022  3:30 PM Theodora Hussein, OT [address]   3/1/2022  4:30 PM Anupama Pearson, PT [address]   3/3/2022  2:45 PM [facility]   3/16/2022 10:00 AM [facility]     ,   Diabetes Assessment    Meal Planning: Other       No patient-reported symptoms      ,   Congestive Heart Failure Assessment    Are you currently restricting fluids?: Other  Do you understand a low sodium diet?: Yes  Do you understand how to read food labels?: Yes         Symptoms:     Weight trend: stable  Salt intake watch compared to last visit: stable      and   General Assessment

## 2022-02-22 ENCOUNTER — HOSPITAL ENCOUNTER (OUTPATIENT)
Age: 62
Setting detail: SPECIMEN
Discharge: HOME | End: 2022-02-22

## 2022-02-23 ENCOUNTER — CARE COORDINATION (OUTPATIENT)
Dept: CARE COORDINATION | Age: 62
End: 2022-02-23

## 2022-02-23 LAB
ANION GAP SERPL CALCULATED.3IONS-SCNC: 16 MMOL/L (ref 9–17)
BUN BLDV-MCNC: 37 MG/DL (ref 8–23)
CALCIUM SERPL-MCNC: 9.5 MG/DL (ref 8.6–10.4)
CHLORIDE BLD-SCNC: 112 MMOL/L (ref 98–107)
CO2: 17 MMOL/L (ref 20–31)
CREAT SERPL-MCNC: 2.7 MG/DL (ref 0.5–0.9)
GFR AFRICAN AMERICAN: 22 ML/MIN
GFR NON-AFRICAN AMERICAN: 18 ML/MIN
GFR SERPL CREATININE-BSD FRML MDRD: ABNORMAL ML/MIN/{1.73_M2}
GLUCOSE BLD-MCNC: 319 MG/DL (ref 70–99)
POTASSIUM SERPL-SCNC: 5.7 MMOL/L (ref 3.7–5.3)
SODIUM BLD-SCNC: 145 MMOL/L (ref 135–144)

## 2022-02-25 ENCOUNTER — HOSPITAL ENCOUNTER (OUTPATIENT)
Age: 62
Setting detail: SPECIMEN
Discharge: HOME | End: 2022-02-25

## 2022-02-25 DIAGNOSIS — E13.22 SECONDARY DIABETES MELLITUS WITH STAGE 4 CHRONIC KIDNEY DISEASE (HCC): ICD-10-CM

## 2022-02-25 DIAGNOSIS — N18.4 CKD (CHRONIC KIDNEY DISEASE), STAGE IV (HCC): ICD-10-CM

## 2022-02-25 DIAGNOSIS — N18.4 SECONDARY DIABETES MELLITUS WITH STAGE 4 CHRONIC KIDNEY DISEASE (HCC): ICD-10-CM

## 2022-02-25 DIAGNOSIS — I12.9 BENIGN HYPERTENSION WITH CKD (CHRONIC KIDNEY DISEASE) STAGE IV (HCC): ICD-10-CM

## 2022-02-25 DIAGNOSIS — N18.4 BENIGN HYPERTENSION WITH CKD (CHRONIC KIDNEY DISEASE) STAGE IV (HCC): ICD-10-CM

## 2022-02-25 LAB
REASON FOR REJECTION: NORMAL
ZZ NTE CLEAN UP: ORDERED TEST: NORMAL
ZZ NTE WITH NAME CLEAN UP: SPECIMEN SOURCE: NORMAL

## 2022-02-28 ENCOUNTER — CARE COORDINATION (OUTPATIENT)
Dept: CARE COORDINATION | Age: 62
End: 2022-02-28

## 2022-02-28 DIAGNOSIS — Z99.2 DIALYSIS PATIENT (HCC): ICD-10-CM

## 2022-02-28 DIAGNOSIS — N18.6 END-STAGE RENAL DISEASE (HCC): Primary | ICD-10-CM

## 2022-02-28 NOTE — CARE COORDINATION
Ambulatory Care Coordination Note  2/28/2022  CM Risk Score: 7  Charlson 10 Year Mortality Risk Score: 100%     ACC: Adam Molina, RN    Summary Note: Spoke with patient who stated she is doing pretty well. She is expected to start dialysis back up soon but requires a covid test 1st. She is asking if PCP order a covid test for her so that she can go to the walk in and get it done. Per patient she needs a PCR test.   ACM will route to provider and follow up with patient in 1-2 weeks. Care Coordination Interventions    Program Enrollment: Complex Care  Referral from Primary Care Provider: Yes  Suggested Interventions and Community Resources  Diabetes Education: In Process  Physical Therapy: Completed  Transportation Support: Declined  Zone Management Tools: In Process         Goals Addressed                 This Visit's Progress     Conditions and Symptoms   On track     I will schedule office visits, as directed by my provider. I will keep my appointment or reschedule if I have to cancel. I will notify my provider of any barriers to my plan of care. I will follow my Zone Management tool to seek urgent or emergent care. I will notify my provider of any symptoms that indicate a worsening of my condition. Barriers: overwhelmed by complexity of regimen  Plan for overcoming my barriers: work with ACM   Confidence: 9/10  Anticipated Goal Completion Date: 4.26.22              Prior to Admission medications    Medication Sig Start Date End Date Taking?  Authorizing Provider   sodium polystyrene (SPS) 15 GM/60ML suspension Take 60 mLs by mouth once a week 2/25/22   [facility], MD   tiZANidine (ZANAFLEX) 4 MG tablet Take 1 tablet by mouth every 6 hours as needed (spasm) 2/15/22 3/17/22  anysean Lovett APRN - CNP   Probiotic Acidophilus [facility]) TABS Take 1 tablet by mouth daily 2/14/22 3/16/22  anya Rachell APRN - CNP   ReliOn Lancets Micro-Thin 33G MISC USE AS DIRECTED EVERY DAY 1/28/22   Historical Provider, MD   traZODone (DESYREL) 50 MG tablet Take 1.5 tablets by mouth nightly 2/7/22   AFSANEH Rosa CNP   blood glucose test strips (TRUE METRIX BLOOD GLUCOSE TEST) strip 1 each by In Vitro route 3 times daily Uncontrolled diabetes 2/2/22 3/4/22  AFSANEH Rosa CNP   benzonatate (TESSALON) 100 MG capsule Take 1 capsule by mouth 3 times daily as needed for Cough 2/2/22 3/4/22  AFSANEH Rosa CNP   sodium bicarbonate 650 MG tablet Take 1 tablet by mouth 2 times daily 1/6/22   Estela Trevino MD   Insulin Pen Needle (Crystal Tino PEN NEEDLES) 31G X 6 MM MISC 1 each by Does not apply route 5 times daily 12/23/21 3/23/22  AFSANEH Rosa CNP   HUMALOG 100 UNIT/ML injection vial  11/29/21   Historical Provider, MD   insulin glargine (BASAGLAR KWIKPEN) 100 UNIT/ML injection pen Inject 73 Units into the skin 2 times daily 11/30/21   AFSANEH Rosa CNP   Blood Glucose Monitoring Suppl (TRUE METRIX GO GLUCOSE METER) w/Device KIT 1 each by Does not apply route 4 times daily 11/30/21   AFSANEH Rosa CNP   Lancet Device MISC 1 Device by Does not apply route once for 1 dose 11/30/21 11/30/21  AFSANEH Rosa CNP   Lancets MISC 1 each by Does not apply route daily 11/30/21   AFSANEH Rosa CNP   insulin aspart (NOVOLOG FLEXPEN) 100 UNIT/ML injection pen Sliding scale three times daily with meals as follows:  Glucose <139  No Insulin; 140-199  3 Units; 200-249  6 Units; 250-299  9 Units; 300-349  12 Units; 350-400  15 Units; Above 400  18 Units 11/23/21   AFSANEH Rosa CNP   ELIQUIS 5 MG TABS tablet Take 1 tablet by mouth 2 times daily 11/22/21   Carson Ly MD   gabapentin (NEURONTIN) 300 MG capsule Take 1 capsule by mouth 2 times daily for 30 days.  11/22/21 12/24/21  Carson Ly MD   furosemide (LASIX) 80 MG tablet Take 1 tablet by mouth 2 times daily 11/22/21   Carson Ly MD   febuxostat (ULORIC) 40 MG TABS tablet Take 1 tablet by mouth daily 11/22/21   Zonia Gonzalez Bonny Perales MD   docusate sodium (COLACE) 100 MG capsule Take 1 capsule by mouth 2 times daily 11/22/21   Haseeb Madera MD   tamsulosin [facility]) 0.4 MG capsule Take 1 capsule by mouth daily 11/23/21   Haseeb Madera MD   atorvastatin (LIPITOR) 80 MG tablet Take 1 tablet by mouth daily 11/3/21   AFSANEH Dick CNP   aspirin 81 MG chewable tablet Take 1 tablet by mouth daily 9/25/21   Laura Roman MD   ranolazine (RANEXA) 1000 MG extended release tablet Take 1 tablet by mouth 2 times daily 9/25/21   Laura Roman MD   busPIRone (BUSPAR) 7.5 MG tablet Take 1 tablet by mouth 3 times daily 9/25/21   Laura Roman MD   carvedilol (COREG) 6.25 MG tablet Take 1 tablet by mouth 2 times daily 9/25/21   Laura Roman MD   pantoprazole (PROTONIX) 40 MG tablet Take 1 tablet by mouth every morning (before breakfast) 9/25/21   Laura Roman MD   senna (SENOKOT) 8.6 MG tablet Take 2 tablets by mouth daily as needed (Constipation) 8/25/21   Laura Roman MD   allopurinol (ZYLOPRIM) 100 MG tablet Take 1 tablet by mouth daily 4/14/21   AFSANEH Dick CNP       Future Appointments   Date Time Provider Balbir Darnellisti   3/1/2022  2:30 PM AFSANEH Dick - [address] [place] [address]   3/1/2022  3:30 PM Wendy Subramanian, OT [address]   3/1/2022  4:30 PM Sierra Gonzalez, PT [address]   3/3/2022  2:45 PM Jairo Lomas MD heart[facility]asc [facility]   3/3/2022  4:20 PM Sy Lorenzo MD Neuro Spec [facility]   3/16/2022 10:00 AM NIDA Arz[address] [facility]     ,   Diabetes Assessment    Meal Planning: Other       No patient-reported symptoms      ,   Congestive Heart Failure Assessment    Are you currently restricting fluids?: Other  Do you understand a low sodium diet?: Yes  Do you understand how to read food labels?: Yes         Symptoms:     Symptom course: stable  Weight trend: stable  Salt intake watch compared to last visit: stable      and   General Assessment    Do you have any symptoms that are causing concern?: No

## 2022-02-28 NOTE — CARE COORDINATION
Patient called stating that [facility] told her that they are waiting on PCP to do something for the Fritz Goodrichut that was ordered when she was at the hospital.   ACM explained that that RX went to [address] Patient will call there to check on RX and call ACM back with any issues. Patient was notified that PCP did place the covid test order.

## 2022-03-01 ENCOUNTER — HOSPITAL ENCOUNTER (OUTPATIENT)
Dept: PHYSICAL THERAPY | Age: 62
Setting detail: THERAPIES SERIES
Discharge: HOME | End: 2022-03-01
Payer: COMMERCIAL

## 2022-03-01 ENCOUNTER — HOSPITAL ENCOUNTER (OUTPATIENT)
Dept: OCCUPATIONAL THERAPY | Age: 62
Setting detail: THERAPIES SERIES
Discharge: HOME | End: 2022-03-01
Payer: COMMERCIAL

## 2022-03-01 ENCOUNTER — HOSPITAL ENCOUNTER (OUTPATIENT)
Age: 62
Setting detail: SPECIMEN
Discharge: HOME | End: 2022-03-01

## 2022-03-01 PROBLEM — E66.9 OBESITY, CLASS II, BMI 35-39.9: Status: RESOLVED | Noted: 2021-04-07 | Resolved: 2022-03-01

## 2022-03-01 PROBLEM — R55 SYNCOPE AND COLLAPSE: Status: RESOLVED | Noted: 2021-04-06 | Resolved: 2022-03-01

## 2022-03-01 PROBLEM — E66.812 OBESITY, CLASS II, BMI 35-39.9: Status: RESOLVED | Noted: 2021-04-07 | Resolved: 2022-03-01

## 2022-03-01 PROBLEM — R29.90 STROKE-LIKE SYMPTOMS: Status: RESOLVED | Noted: 2021-09-13 | Resolved: 2022-03-01

## 2022-03-01 LAB
ANION GAP SERPL CALCULATED.3IONS-SCNC: 16 MMOL/L (ref 9–17)
BUN BLDV-MCNC: 34 MG/DL (ref 8–23)
CALCIUM SERPL-MCNC: 9.7 MG/DL (ref 8.6–10.4)
CHLORIDE BLD-SCNC: 110 MMOL/L (ref 98–107)
CO2: 18 MMOL/L (ref 20–31)
CREAT SERPL-MCNC: 2.35 MG/DL (ref 0.5–0.9)
GFR AFRICAN AMERICAN: 25 ML/MIN
GFR NON-AFRICAN AMERICAN: 21 ML/MIN
GFR SERPL CREATININE-BSD FRML MDRD: ABNORMAL ML/MIN/{1.73_M2}
GLUCOSE BLD-MCNC: 279 MG/DL (ref 70–99)
POTASSIUM SERPL-SCNC: 5 MMOL/L (ref 3.7–5.3)
SODIUM BLD-SCNC: 144 MMOL/L (ref 135–144)

## 2022-03-01 PROCEDURE — 97162 PT EVAL MOD COMPLEX 30 MIN: CPT

## 2022-03-01 PROCEDURE — 97166 OT EVAL MOD COMPLEX 45 MIN: CPT

## 2022-03-01 ASSESSMENT — 9 HOLE PEG TEST
TESTTIME_SECONDS: 30
TEST_RESULT: IMPAIRED
TESTTIME_SECONDS: 24

## 2022-03-01 ASSESSMENT — PAIN DESCRIPTION - DESCRIPTORS: DESCRIPTORS: NUMBNESS

## 2022-03-01 ASSESSMENT — PAIN DESCRIPTION - ORIENTATION: ORIENTATION: LEFT;RIGHT

## 2022-03-01 ASSESSMENT — PAIN DESCRIPTION - LOCATION: LOCATION: FOOT;HAND

## 2022-03-01 NOTE — CONSULTS
[x] [facility]) - [facility] Therapy  [address]  [place]: [phone]   F: [phone]        Physical Therapy Neurological Evaluation    Date:  3/1/2022  Patient: Emelina Painting  : 1960  MRN: [id number]  Physician: AFSANEH Hodge - CNP  Insurance: [employer]  visits -- Kyle Torres after 20 visits   Medical Diagnosis:   M62.81 (ICD-10-CM) - Muscle right arm weakness   I63.9 (ICD-10-CM) - Ischemic stroke of frontal lobe (HCC)   M54.41, M54.42, G89.29 (ICD-10-CM) - Chronic midline low back pain with bilateral sciatica   Rehab Codes: Z74.0 reduced mobility, R53 weakness, R 53. Deconditioning, R29.6 fall risk   Date of symptom onset: initial CVA 21  Next 's appt. : 3/3/22 with neurology     PRECAUTIONS: allergic to adhesive tapes; not allowed to get chest port wet; no lifting or BP to be done with L arm; Dialysis; CHF       Subjective:   Pt arrives to clinic ambulatory with rollator after OT eval. Pt is agreeable to PT evaluation. Pt is currently on 2 L supplemental O2 throughout the eval. Pt is previously known to this clinician as she had aquatic PT 21- 21. She then returned to PT 21 and completed 1 follow up visit before being hospitalized due to medical complexities. Pt was then hospitalized 21-22. She has returned to the ED recently 22 for dialysis as she was missing dialysis due to insurance. Pt was to have PT eval 2/15 at this clinic but was held due to oxygen desaturation and not having supplemental O2. Per last PT eval 21: \"She notes she had CVA causing R sided weakness 21. She wad treated at [address] and then was sent into stage 5 kidney failure from having CT with dye. Since then has been on dialysis and has continued with dialysis 3x/wk (MWF). She then completed ARU -. She then returned to the ER due to episode of LOS & confusion at dialysis 21.  She was admitted again to ARU 9/17-9/26. She then returned to the ED again with AMS 11/10/21 -11/15/21. She again completed ARU 11/16-11/23. Recently she had a fistual placed 12/14 in LUE that is currently healing. She still has a R sided chest port for dialysis for ~4 more weeks. \"    Currently pt states she is feeling weaker and less endurance. She denies any falls since being home from the hospital. She reports numbness that has progressed up with shin since hospitalization. Denies any current low back pain. Still having some short term memory issues but been doing better with long term memory. Will being doing dialysis on R at [facility]. Will be having port taken out in April.      Patient stated Goals:    - wants to get rid of walker   - wants to improve balance   - getting back to doing things like ADLs  - be able to walk in the store without using a grocery cart     Pain:  [] Yes  [x] No Location: denies   Pain Rating: (0-10 scale) denies/10  Pain descriptors:   Pain altered Tx:  [] Yes  [] No  Action:     Previous PT hx:   - was in PT prior to CVA -- aquatic therapy   - did ARU multiple times   - did home health therapy post hospitalizations  - did outpatient PT at this clinic for eval and x1 follow up visit   - had inpatient PT when admitted to hospital in Jan 2022 but no follow ups         PMHx: [] Unremarkable [x] Diabetes [x] HTN  [] Pacemaker   [x] MI/Heart Problems (CHF) [] Cancer [] Arthritis [] Other:              [x] Refer to full medical chart  In EPIC     Comorbidities:   [x] Obesity [x] Dialysis  [x] Other: anxiety   [x] Asthma/COPD [] Dementia [x] Other: CABG   [x] Stroke [] Sleep apnea [x] Other: diabetic neuropathy in B hands & feet    [] Vascular disease [] Rheumatic disease [] Other:     Tests/Imaging: none new       Medications: [x] Refer to full medical record [] None [] Other:  Allergies:       [x] Refer to full medical record [] None [x] Other: Adhesive tapes       Function:  Hand Dominance  [x] Right  [] Left    Home Environment:   Patient lives with:  her mother -- assists in mother's care     In what type of home [x]? One story condo   []? Two story   []? Split level   Number of stairs to enter  ramp entry    Handrail:    []? Right to enter   []? Left to enter    Stairs within the home   0        Handrails: []? Right to ascending  []? Left to ascending   Bathroom has a []? Tub only  []? Tub/shower combo   [x]? Walk in shower    []? Grab bars    * has a shower that she has to step ~6 in over        Washing machine is on [x]? Main level   []? Second level   []?  Basement   Employer/Job Retired         Durable Medical Equipment:  Current DME available: rollator, SPC , WC for long distances, shower chair     ADL/IADL Previous level of function Current level of function Who currently assists the patient with task/Comments   Bathing  [x] Independent  [] Assist [] Independent  [x] Assist Sister assists; using WIS and shower chair    Dress/grooming [x] Independent  [] Assist [x] Independent  [] Assist Sitting to get dressed    Transfer/ bed mobility [x] Independent  [] Assist [x] Independent  [] Assist Sleeping in recliner because of port    Feeding [x] Independent  [] Assist [x] Independent  [] Assist    Toileting [x] Independent  [] Assist [x] Independent  [] Assist Sometimes urinary urgency    Driving [x] Independent  [] Assist [] Independent  [x] Assist Family assisting    Housekeeping [x] Independent  [] Assist [] Independent  [x] Assist Doing very limited tasks; sisters are helpful   Grocery shop/meal prep [x] Independent  [] Assist [] Independent  [x] Assist Goes with sister and sister doing the long shopping trips    Ambulation [x] Independent  [] Assist  [] Independent  [x] Assist  Using rollator        Objective:    POSTURE No deficit Deficit Not Tested Comments   Kyphosis [] [x] []    Scoliosis [x] [] []    Forward Head [] [x] []    Rounded Shldrs [] [x] []    Slumped Sitting [] [x] []    Skin Integrity [] [x] [] bruising noted around L arm fistula          NEUROLOGICAL       Reflexes [] [] [x]    Sensation [] [x] []    Bladder/Bowel [] [x] [] Urinary urgency   Coordination [] [x] []    Tone [x] [] []    Clonus [x] [] []    Tremors [x] [] []              Strength  Left Strength  Right   Hip Flexion 4- 4-   Hip Abduction 4 4   Hip Adduction 4+ 4+   Hip Extension 4- 4   Knee Flexion 3+ 4   Knee Extension 3+ 4   Dorsiflexion 4 4   Plantar flexion 4 4      Spasticity/tone:  none      Sensory/Vision: limited sensation with bilateral feet; limited proprioceptive awareness in the R vs L with heel to shin testing. FUNCTION Level of assistance Comments/observations   Bed Mobility Did not assess this date as sleeps in recliner     -rolling     -sit to supine     -supine to sit     Transfers     -sit to stand Gigi Needs UE support and to stabilize upon standing with UE support    -pivot CGA  With use of 2WW    Balance     -sitting static IND     - sitting dynamic IND     -standing static SBA  Needs UE support for any challenging balance position    - standing dynamic CGA  Needs device to stabilize   Ambulation CGA    Distance/Device: 75ft with CGA using rollator   Analysis: decreased heel strike, forward flexed posture, lack of terminal hip extension        Functional outcome measures:     Functional Patient Reported Outcome Assessment Used:  OPTIMAL (balancing, bending, walking short, walking long, walking outdoors)  Current Status Score: 18/25  Goal Status Score: 13/25    **baseline vitals: 99% on 2L O2, HR 80**    Outcome Measures  3/1/22    BROWN 19/56 -- high fall risk; SpO2 94%  Post     5xSTS 32.4s using UEs and arise & stabilizes; SpO2 97% post    10mWT     6 minute walk test      TUG  22.03 with rollator; supervision, SpO2 98% post     MiniBEST         03/01/22 1700   Brown Balance Scale   1. Sitting to Standing 1   2. Standing Unsupported 3   3.  Sitting with Back Unsupported but Feet Supported on Floor or on a Stool 4 4. Standing to Sitting 3   5. Transfers 2   6. Standing Unsupported with Eyes Closed 2   7. Standing Unsupported with Feet Together 1   8. Reach Forward with Outstretched Arm While Standing 0   9.  Object from Floor from a Standing Position 0   10. Turning to Look Behind Over Left and Right Shoulders While Standing 2   11. Turn 360 Degrees 0   12. Place Alternate Foot on Step or Stool While Standing Unsupported 0   13. Standing Unsupported One Foot in [address] [facility] 1   14. Standing on One Leg 0   Brown Balance Score 19   Brown Balance Disability Index 60-79%   Brown CMS Modifier CL         Assessment:    Pt with a significant medical hx including recent CVAs, kidney failure now on dialysis, multiple hospital stays, and episodes of encephlaoptahy  in the last 9 months presents to therapy with decreased mobility, limited sensation, decreased strength, and limited balance that has impacted her ADLs and functional mobility. She was previously independent & caring for her mother but now she is needing to use a rollator and requiring intermittent assist herself due to limited capacity. Her strength deficits seem more global from decreased activity in the past couple months vs true hemiparesis from her CVA. She presents as a high fall risk given her sensory  & strength deficits. Per scores on BROWN, 5xSTS, and TUG she would score as a high fall risk. Her balance appears more affected than previously noted in last POC as her score decreased from 24/56 to 19/56-- feel lengthy hospitalization is a factor. She also is now on 2L of supplemental O2 that she wears for the whole session and ranges from 99-94% SPO2 with activity. Will need to continue to monitor oxygenation as she progresses with her endurance Feel she will require an extended plan of care addressing strength, balance, gait, and endurance to help her gain more capacity and independence with her mobility & ADLs. Problems:    [] ? Pain:  [] ? ROM:  [x] ? Strength:  [x] ? Function:  [x] Other:  decreased balance, decreased edurance. STG: (to be met in 8 treatments)  1. Pt will be able to complete sit to stand from a standard chair without UE support to increase independence to transfer from any surface. 2. Pt will be able to to complete 6 minutes of continuous ambulation with LRAD at mod IND level to progress endurance needed for community ambulation   3. Pt will demonstrate >10 SLS stability with light UE support to safely allow pt to enter/exit her tub shower. 4.  Pt will improve score on BROWN balance scale by >/=6 points to reach minimal detectable change for CVA population to improve overall balance stability and decrease fall risk with mobility. 5.  Pt will improve time on 5xSTS to <20 seconds without use of UEs to decrease fall risk and increase functional capacity for mobility. 6. Pt will increase gait speed to >/=  0.8 m/s with LRAD at mod I level to improve ability to navigate as a household/community ambulator. LTG: (to be met in 15 treatments)  1. Pt will improve score on BROWN balance scale by >/= 45/56 points  to improve overall balance stability and decrease fall risk with mobility. 2.  Pt will improve time on 5xSTS to <15 seconds without use of UEs to decrease fall risk and increase functional capacity for mobility. 3. Pt will be independent with home program addressing strength, flexibility and balance to maximize gains made in therapy to improve overall functional capacity for mobility. 4. Pt will report no falls for x6 weeks demonstrating improved safety with mobility and implementation of fall prevention strategies. 5. Pt will increase score on SIS-16 to >/= 60/80 to demonstrate improved functional levels with ADLs. 6. Pt will increase gait speed to >/=  1.0 m/s with LRAD at mod I level to improve ability to navigate as a community ambulator.    7. Pt will be able to ambulate 20 ft without AD at IND level without LOB to improve mobility to no AD for in the home. Rehab Potential:  [x] Good  [] Fair  [] Poor   Suggested Professional Referral:  [x] No  [] Yes:  Barriers to Goal Achievement[de-identified]  [] No  [x] Yes: medical complexity-- dialysis   Domestic Concerns:  [x] No  [] Yes:    Pt. Education:  [x] Plans/Goals, Risks/Benefits discussed  [] Home exercise program  Method of Education: [x] Verbal  [x] Demo  [] Written  Comprehension of Education:  [x] Verbalizes understanding. [x] Demonstrates understanding. [] Needs Review. [] Demonstrates/verbalizes understanding of HEP/Ed previously given.     Treatment Plan:  [x] Therapeutic Exercise   78089  [] Iontophoresis: 4 mg/mL Dexamethasone Sodium Phosphate  mAmin  82060   [x] Therapeutic Activity  10981 [] Vasopneumatic cold with compression  25236    [x] Gait Training   26053 [] Ultrasound   68423   [x] Neuromuscular Re-education  15816 [] Electrical Stimulation Unattended  16946   [x] Manual Therapy  54621 [] Electrical Stimulation Attended  90139   [x] Instruction in HEP  [] Dry Needling   [] Aquatic Therapy   99065 [] Cold/hotpack    [] Massage   85389      [] Lumbar/Cervical Traction  35088     Frequency: 2 x/weeks for 15 visits    Todays Treatment:  None this date     Specific Instructions for next treatment: 10mWT, provide with seated HEP, progress to standing activities as able      Evaluation Complexity:  History (Personal factors, comorbidities) [] 0 [] 1-2 [x] 3+   Exam (limitations, restrictions) [] 1-2 [x] 3 [] 4+   Clinical presentation (progression) [] Stable [x] Evolving  [] Unstable   Decision Making [] Low [x] Moderate [] High    [] Low Complexity [x] Moderate Complexity [] High Complexity       Treatment Charges: Mins Units   [x] Evaluation       []  Low       [x]  Moderate       []  High 41 1   []  Modalities     []  Ther Exercise     []  Manual Therapy     []  Ther Activities     []  Aquatics     []  Vasocompression     []  Other       TOTAL TREATMENT TIME: 41      (Billed timed treatment: 0)     Time In: 4:35p    Time Out: 5:16     Electronically signed by:   Katerina Fuller PT, DPT   #[id number]

## 2022-03-01 NOTE — PROGRESS NOTES
Occupational [facility]  Rehabilitation Services   Occupational Therapy Evaluation  Date: 3/1/22  Patient Name: Raegan Reynoso      MRN: [id number]  Account: [de-identified]   : 1960  (59 y.o.)  Gender: female   Referring Practitioner: Kai VICENTE CNP  Diagnosis: long term memory loss (R41.3), , muscle rt arm weakness (M62.81)  Additional Pertinent Hx: Fistula lt forearm 2021. Stroke Aug 1, 2021  OT Visit Information  Onset Date:  (Dec 2021)  OT Insurance Information: [facility]  (after the  OT visit - need insurance approval before further appt)  Total # of Visits Approved: 20  Total # of Visits to Date: 1  Past Medical History:  has a past medical history of Backache, unspecified, CHF (congestive heart failure) ([facility] [address] Chronic kidney disease, Coronary atherosclerosis of artery bypass graft, COVID, Cramp of limb, Gallstones, Hypertension, Insomnia, Pneumonia, Type II or unspecified type diabetes mellitus with renal manifestations, not stated as uncontrolled(250.40), Type II or unspecified type diabetes mellitus without mention of complication, not stated as uncontrolled, and Unspecified vitamin D deficiency. Past Surgical History:   has a past surgical history that includes Coronary artery bypass graft; Knee arthroscopy; Carpal tunnel release; Breast surgery; Tonsillectomy; Hand surgery; Ankle fracture surgery; Cholecystectomy, open (N/A); IR TUNNELED CVC PLACE WO SQ PORT/PUMP > 5 YEARS (2021); AV fistula creation (2021); and Dialysis fistula creation (Left, 2021). Problem List: does not have any pertinent problems on file. Pain Assessment  Patient Currently in Pain: Denies  Pain Location: Foot,Hand  Pain Orientation: Left,Right  Pain Descriptors: Numbness (neuropathy)  Subjective  Subjective: Pt using 2 L O2 now all the time. Pt states without O2 her sats were 81% earlier today.  Pt states she was in hospital from Dec 2021 to  2022 for about 2 weeks. Then pt got rehab at [facility] Jan 20 -31, 2022. Pt states even with her O2 she is weak & tired. Comments: Pt seen for OT patricia. Pt has bruising over lt upper arm where fistula is. Pt states she gets tired when she showers . Pt states she can't get old port rt side chest wet. Pt states she wants to drive again. OT told pt that her doctor will need to order OT  eval when she is ready for that. Vision  Vision: Impaired  Vision Exceptions: Wears glasses for reading  Hearing  Hearing: Within functional limits  Social/Functional History  Lives With:  (Pt's mother lives with her, pt takes care of her)  Type of Home:  (condo)  Home Layout: One level  Home Access: Ramped entrance (ramp in  garage)  [employer]: Built-in shower seat,Grab bars in shower,Grab bars around toilet  [address]: 4 wheeled walker,Wheelchair-electric,Cane,Oxygen (pt using 2 L O2)  ADL Assistance: Independent (prior)  Homemaking Assistance: Independent (prior)  Ambulation Assistance: Independent  Transfer Assistance: Independent  Active : No (Pt states she wants to drive)  Mode of Transportation: Family  Occupation: Retired  Type of occupation: Pt worked for [employer] for 16 years  IADL Comments: See UEFS for details. Upper Extremity Functional Scale -   Instruction to patient  - We are interested in knowing whether you are having any difficulty at all with the activities listed below because of your upper limb problem for which you are currently seeking attention.     Key:  0 = Extreme Difficulty or Unable to Perform Activity   1 = Quite a Bit of Difficulty   2 = Moderate difficulty   3 = A Little Bit of Difficulty   4 = No Difficulty      03/01/22 1159   The Upper Extremity Functional Scale   Any of your usual work, housework, or school activities 2  (housework)   Your usual hobbies, recreational, or sporting activities   (n/a)   Lifting a bag of groceries to waist level 0  (between extreme to quite a bit. Pt tries not to lift anything with lt arm d/t her fistula. If she lifts pt uses rt hand.)   Lifting a bag of groceries above your head   (n/a)   Grooming your hair 2  (Pt uses 1  hand for brush & 1 hand for blow dryer . Pt reports her arms get tired.)   Pushing up on your hands (eg from bathtub/chair)   (using rt UE)   Preparing food (eg peeling, cutting) 2   Driving   (Pt hasn't driven since she had stroke in Aug 1, 2021)   Vacuuming, sweeping, or raking   (Pt's sisters come over to vacuum/sweep. Pt is looking to buy new sweeper)   Dressing 3  (Pt states the biggest issue is putting on her on socks. Pt gets tired.)   Doing up buttons   (n/a - pt doesn't wear anything with buttons)   Using tools or appliances   (n/a for tools)   Opening doors 4   Cleaning 0  (Pt wipes counters in kitchen, but the heavy cleaning pt's sisters do that.)   Tying or lacing shoes 3  (Pt has numbness fingers.)   Sleeping   (Pt sleeps in recliner because of her port. Pt should get port out April 25, 2022)   Laundering clothes (eg washing, ironing, folding)  2   Opening a jar 3  (Pt uses rubber pad to open jars using rt/lt hands.)   Throwing a ball   (n/a)   Carrying a small suitcase with your affected limb 3  (Detergent is hard to do)   UEFS Score 30   UEFS Disability Index 60-79%   UEFS CMS Modifier CL     Objective    Sensation  Overall Sensation Status: Impaired (Neuropathy bilateral hands/feet.)   ADL  Additional Comments: Pt reports independence with ADL eating,bathing, & modified independence getting dressed & hair grooming. Aba Ba UE Function  Hand Dominance  Hand Dominance: Right   LUE Strength  LUE Strength Comment: Shoulder (flexion & abduction ) 4-/5, elbow (flexion & extension) 5/5, wrist (flexion & extension ) 5/5  LUE Tone: Normotonic  LUE AROM (degrees)  LUE AROM : WNL  Left Hand AROM (degrees)  Left Hand AROM: WFL  Left Hand General AROM: Pt reports decrease feeling during opposition.  Opposition - wfls  RUE Strength  R Shoulder Flex: 4-/5  R Shoulder ABduction: 3+/5  R Shoulder Int Rotation: 3+/5  R Shoulder Ext Rotation: 3+/5  R Elbow Flex: 4/5  R Elbow Ext: 4/5  R Forearm Pron: 4-/5  R Forearm Sup: 4-/5  R Wrist Flex: 4-/5  R Wrist Ext: 4/5 (Tingling wrist area with MMT)  R Hand General: 3+/5 (Extension & flexion . Pt reports pulling sensation dorsal side rt wrist with MMT fingers.)  RUE Strength Comment: Pt states she can feel it in her rt shoulder from MMT. RUE Tone: Normotonic  RUE AROM (degrees)  RUE AROM : WFL  Right Hand AROM (degrees)  Right Hand AROM: WFL  R Thumb Opposition: Pt reports decrease feeling during opposition. Opposition - wfls  Coordination  Movements Are Fluid And Coordinated: No  Coordination and Movement description: Fine motor impairments,Right UE,Decreased speed   Left Hand Strength -  (lbs)  Handle Setting 2: 25,25  Left Hand Strength - Pinch (lbs)  Lateral: 6,5  Tip: 3,3  Palmar 3 point: 5,5  Right Hand Strength -  (lbs)  Handle Setting 2: 25,20 average 22.5# below the 10th percentile for norms  Right Hand Strength - Pinch (lbs)  Lateral: 7,7- below 10th percentile for norms  Tip: 5,4 below 10th percentile for norms  Palmar 3 point: 6,5 10th percentile for norms  Fine Motor Skills  [place] Rate of Manipulation: MROM placing test : rt UE Pt completes 15 rows (4 disc each)  in 1 minute 22 seconds. Pt reports soreness rt shoulder. Left 9 Hole Peg Test Time (secs): 30  Right 9-Hole Peg Test: Impaired (25th percentile for norms)  Right 9 Hole Peg Test Time (secs): 24 (Pt states it's hard with not feeling with her fingers.)   Assessment  Performance deficits / Impairments: Decreased fine motor control,Decreased high-level IADLs,Decreased strength,Decreased endurance  Assessment: O2 sats 97% & HR 88 at begining of OT tx. Pt states some memory issues(some things she remembers and some things she can't). Pt states date, age, president. Pt using 2 L O2. Pt will benefit from skilled OT to provide educationon energy conservation, therapeutic exercises to improve rt UE coordination & strength to increase pt's independence using rt UE for IADLs. Treatment Diagnosis: Rt UE weakness,impaired coordination,impaired sensation hands, decrease endurance. (M62.81,R27.9,R20.2)  Prognosis: Good  Decision Making: Medium Complexity  Exam: 4 performance deficits. Assistance / Modification: See UEFS for details. Pt states she isn't sure if she will have to do more dialysis. REQUIRES OT FOLLOW UP: Yes  Discharge Recommendations: Outpatient OT    Patient Education:  Patient Education: Pt failiar with OT , reviewed with pt that will will focus on energy conservation, rt UE strength,coordination, & endurance. Learner:patient  Method: explanation       Outcome: demonstrated understanding   Goals  Patient Goals   Patient goals : To do more cook (pt reports she lost interest in this activity)  Short term goals  Time Frame for Short term goals: 10 visits  Short term goal 1: Pt will demo independence with rt UE HEP & verbalize 2-3 energy conservation techniques she can use for ADL/IADLs  Short term goal 2: Pt will demo improved coordination bycompleting 9 hole peg test 2 seconds quicker than eval..  Short term goal 3: Pt will demo improved rt hand manual dexterity by completing MROM placing test 5-10 seconds quicker than eval..  Short term goal 4: Increase rt hand strength (  by 10#, pinch (lateral ,tip, 3jaw) by 2# for holding , gripping, opening jars/containers, & stabilizing objects during daily activities  Long term goals  Time Frame for Long term goals : 20 visits  Long term goal 1: Using UEFS pt will report score 3 (little difficulty) using rt UE for light housework, lift grocery bag, hair grooming, cutting/peeling, laundry. Long term goal 2:  Increase rt UE strength to 4/5 shoulder, forearm, wrist flexion, hand to improve pt's lift/carry ability for household activities,  Long term goal 3: Compared to eval increase rt hand strength( by 15#, pinch (lateral,tip, 3jaw ) by 5#.   Plan  REQUIRES OT FOLLOW UP: Yes  Plan  Times per week: 2x/week  Plan weeks: 20 visits  Current Treatment Recommendations: ROM,Strengthening,Patient/Caregiver Education & Training,Neuromuscular Re-education (coordination, energy conservation)  OT Individual Minutes  Time In: 2792  Time Out: 5650  Minutes: 52  Rehab Potential:  [x] Good  [] Fair  [] Poor   Suggested Professional Referral:  [x] No  [] Yes: Pt getting PT also  Barriers to Goal Achievement:  [] No  [] Yes:Pt using O2 Domestic Concerns:  [x] No  [] Yes:  Treatment Plan:  [x] Therapeutic Exercise      [x] Neuromuscular Re-education       [x] Instruction in HEP       Frequency:       2    X/wk x         20 visits  [x] Plans/Goals, Risk/Benefits discussed with pt  Comprehension of Education [x] D/V Understanding  [] Needs Review  Pt Education: [x] Verbal  [] Demo  [] Written       [address] [facility]  [address] [facility], [address] [place] [address] [address]   [address] [facility], [address]  Phone: [phone]  Fax: [phone]  Electronically signed by Winthrop Riedel, OT on 3/1/22 at 6:58 PM EST      Treatment Charges:  Minutes Units Time In-Out   Ultrasound      Electrical Stim      Iontophoresis      Paraffin       Massage      Eval 49 1    ADL       Ther Exercise      Ther Activities      Neuro Re-Ed      Splinting       Other      Total  Time:  49

## 2022-03-02 ENCOUNTER — CARE COORDINATION (OUTPATIENT)
Dept: CARE COORDINATION | Age: 62
End: 2022-03-02

## 2022-03-02 LAB
CREAT SERPL-MCNC: 2.35 MG/DL (ref 0.5–0.9)
CREATININE CLEARANCE: 13.9 ML/MIN/BSA (ref 71–151)
CREATININE URINE: 29.4 MG/DL (ref 28–217)
LENGTH OF COLLECTION: 24 H
PATIENT HEIGHT: 165 CM
PATIENT WEIGHT: 112 KG
VOLUME: 1996 ML

## 2022-03-02 NOTE — CARE COORDINATION
Patient called to ask if PCP faxed covid test order to [facility] in [facility]. ACM explained that they are a part of [facility] and can pull the order out of [facility]. Patient asked about a rapid test, she is scheduled to resume dialysis tomorrow and requires a negative covid. ACM explained that they are very limited and usually done just for patients being admitted. She stated it is ok if it takes a couple of days she is not having any symptoms from delaying dialysis. She reports feeling well, no concerns. ACM provided patient with contact information for [address] [facility] walk in clinic.

## 2022-03-08 ENCOUNTER — HOSPITAL ENCOUNTER (OUTPATIENT)
Dept: OCCUPATIONAL THERAPY | Age: 62
Setting detail: THERAPIES SERIES
End: 2022-03-08
Payer: COMMERCIAL

## 2022-03-08 ENCOUNTER — CARE COORDINATION (OUTPATIENT)
Dept: CARE COORDINATION | Age: 62
End: 2022-03-08

## 2022-03-08 DIAGNOSIS — Z99.81 OXYGEN DEPENDENT: ICD-10-CM

## 2022-03-08 DIAGNOSIS — I50.32 CHRONIC DIASTOLIC HEART FAILURE (HCC): ICD-10-CM

## 2022-03-08 DIAGNOSIS — R09.02 HYPOXEMIA: Primary | ICD-10-CM

## 2022-03-08 RX ORDER — PANTOPRAZOLE SODIUM 40 MG/1
TABLET, DELAYED RELEASE ORAL
Qty: 30 TABLET | Refills: 0 | OUTPATIENT
Start: 2022-03-08

## 2022-03-08 NOTE — CARE COORDINATION
Patient notified of PCP recommendations as follows:  See previous note. I also ordered a PFT         Documentation      Sommer Angel, APRN - CNP  You 3 hours ago (11:14 AM)     SV       I will place a referral to pulmonary. Will be about 6 weeks for an appt. There are many causes for her oxygen needs to include her kidney disease and heart disease, along with likely chronic changes associated with covid 19. Should follow up with me until seen by pulmonary if needed until seen. Documentation      Sommer Angel, APRN - CNP  You 3 hours ago (11:12 AM)     SV       She should be wearing oxygen all of the time, it was never intended for bedtime only. Patient will call with any concerns.

## 2022-03-08 NOTE — TELEPHONE ENCOUNTER
I will place a referral to pulmonary. Will be about 6 weeks for an appt. There are many causes for her oxygen needs to include her kidney disease and heart disease, along with likely chronic changes associated with covid 19. Should follow up with me until seen by pulmonary if needed until seen.

## 2022-03-08 NOTE — CARE COORDINATION
Ambulatory Care Coordination Note  3/8/2022  CM Risk Score: 7  Charlson 10 Year Mortality Risk Score: 100%     ACC: Adam Molina, RN     Summary Note: Spoke with patient who reports doing ok. She will be seeing Pierre Moore MD Nephrology this week and then know if and when she will be returning to dialysis. She mentioned needing her home O2 more than just at night. Per patient this problem started with covid. At times her oxygen is going into the 70's without oxygen. She is unsure if she should see a pulmonologist or if she has one. ACM will route to PCP. Care Coordination Interventions    Program Enrollment: Complex Care  Referral from Primary Care Provider: Yes  Suggested Interventions and Community Resources  Diabetes Education: In Process  Physical Therapy: Completed  Transportation Support: Declined  Zone Management Tools: In Process         Goals Addressed                 This Visit's Progress     Conditions and Symptoms   On track     I will schedule office visits, as directed by my provider. I will keep my appointment or reschedule if I have to cancel. I will notify my provider of any barriers to my plan of care. I will follow my Zone Management tool to seek urgent or emergent care. I will notify my provider of any symptoms that indicate a worsening of my condition. Barriers: overwhelmed by complexity of regimen  Plan for overcoming my barriers: work with ACM   Confidence: 9/10  Anticipated Goal Completion Date: 4.26.22              Prior to Admission medications    Medication Sig Start Date End Date Taking?  Authorizing Provider   sodium bicarbonate 650 MG tablet Take 1 tablet by mouth 3 times daily 3/2/22   Pierre Moore MD   Insulin Pen Needle (EASY TOUCH PEN NEEDLES) 29G X 12MM MISC 1 each by Does not apply route daily 3/1/22   AFSANEH Rosa - CNP   sodium polystyrene (SPS) 15 GM/60ML suspension Take 60 mLs by mouth once a week 2/25/22   Alexa Agent MD Adrien   tiZANidine (ZANAFLEX) 4 MG tablet Take 1 tablet by mouth every 6 hours as needed (spasm) 2/15/22 3/17/22  AFSANEH Mobley CNP   ReliOn Lancets Micro-Thin 33G MISC USE AS DIRECTED EVERY DAY 1/28/22   Historical Provider, MD   traZODone (DESYREL) 50 MG tablet Take 1.5 tablets by mouth nightly 2/7/22   AFSANEH Mobley CNP   blood glucose test strips (TRUE METRIX BLOOD GLUCOSE TEST) strip 1 each by In Vitro route 3 times daily Uncontrolled diabetes 2/2/22 3/4/22  AFSANEH Mobley CNP   HUMALOG 100 UNIT/ML injection vial  11/29/21   Historical Provider, MD   insulin glargine (BASAGLAR KWIKPEN) 100 UNIT/ML injection pen Inject 73 Units into the skin 2 times daily 11/30/21   AFSANEH Mobley CNP   Blood Glucose Monitoring Suppl (TRUE METRIX GO GLUCOSE METER) w/Device KIT 1 each by Does not apply route 4 times daily 11/30/21   AFSANEH Mobley CNP   Lancet Device MISC 1 Device by Does not apply route once for 1 dose 11/30/21 11/30/21  AFSANEH Mobley CNP   Lancets MISC 1 each by Does not apply route daily 11/30/21   AFSANEH Mobley CNP   insulin aspart (NOVOLOG FLEXPEN) 100 UNIT/ML injection pen Sliding scale three times daily with meals as follows:  Glucose <139  No Insulin; 140-199  3 Units; 200-249  6 Units; 250-299  9 Units; 300-349  12 Units; 350-400  15 Units; Above 400  18 Units 11/23/21   AFSANEH Mobley CNP   ELIQUIS 5 MG TABS tablet Take 1 tablet by mouth 2 times daily 11/22/21   Russ Luque MD   gabapentin (NEURONTIN) 300 MG capsule Take 1 capsule by mouth 2 times daily for 30 days.  11/22/21 12/24/21  Russ Luque MD   furosemide (LASIX) 80 MG tablet Take 1 tablet by mouth 2 times daily 11/22/21   Russ Luque MD   febuxostat (ULORIC) 40 MG TABS tablet Take 1 tablet by mouth daily 11/22/21   Russ Luque MD   docusate sodium (COLACE) 100 MG capsule Take 1 capsule by mouth 2 times daily 11/22/21   Russ Luque MD   tamsulosin (FLOMAX) 0.4 MG capsule Take 1 capsule by mouth daily 11/23/21   Charito Macario MD   atorvastatin (LIPITOR) 80 MG tablet Take 1 tablet by mouth daily 11/3/21   AFSANEH Sanders CNP   aspirin 81 MG chewable tablet Take 1 tablet by mouth daily 9/25/21   Jamie Madera MD   ranolazine (RANEXA) 1000 MG extended release tablet Take 1 tablet by mouth 2 times daily 9/25/21   Jamie Madera MD   busPIRone (BUSPAR) 7.5 MG tablet Take 1 tablet by mouth 3 times daily 9/25/21   Jamie Madera MD   carvedilol (COREG) 6.25 MG tablet Take 1 tablet by mouth 2 times daily 9/25/21   Jamie Madera MD   pantoprazole (PROTONIX) 40 MG tablet Take 1 tablet by mouth every morning (before breakfast) 9/25/21   Jamie Madera MD   allopurinol (ZYLOPRIM) 100 MG tablet Take 1 tablet by mouth daily 4/14/21   AFSANEH Sanders CNP       Future Appointments   Date Time Provider Balbir Palacios   3/11/2022  3:00 PM [facility], MD AFL RenalSrv AFL Renal Se   3/14/2022  2:00 PM Ann-Marie Cotter PTA [facility] MOB PT [facility]   3/16/2022 10:00 AM Briana LESLIEY M[facility]OLPP   3/16/2022 12:15 PM Merlene Waldron PTA [facility] MOB PT [facility]   3/21/2022 11:00 AM Catracho [place]a, PT [facility] MOB PT [facility]   3/24/2022 11:15 AM Catracho [place], PT [facility] MOB PT [facility]   3/28/2022 11:15 AM Catracho [place], PT [facility] MOB PT [facility]   4/1/2022  1:45 PM Ann-Marie Cotter PTA [facility] MOB PT [facility]   4/1/2022  2:30 PM Ever Mcghee OT ST[facility] MOB OT [facility]   4/4/2022  1:45 PM Ever Mcghee OT [facility] [address]   4/4/2022  2:30 PM Catracho [place], PT [facility] MOB PT [facility]   4/7/2022  1:00 PM Ever Mcghee, OT STKATYA MOB OT [facility]   4/7/2022  1:45 PM Catracho [place], PT ST[facility] MOB PT [facility]   4/25/2022 11:00 AM Jairo Esteves MD [facility] HEART/VAS [facility]   4/28/2022  9:40 AM Saba Jiang MD Neuro Spec [facility]     ,   Diabetes Assessment    Meal Planning: Other          ,   Congestive Heart Failure Assessment    Are you currently restricting fluids?: Other  Do you understand a low sodium diet?: Yes  Do you understand how to read food labels?: Yes     No patient-reported symptoms      Symptoms:     Weight trend: stable  Salt intake watch compared to last visit: stable      and   General Assessment    Do you have any symptoms that are causing concern?: No

## 2022-03-09 ENCOUNTER — HOSPITAL ENCOUNTER (OUTPATIENT)
Dept: GENERAL RADIOLOGY | Facility: CLINIC | Age: 62
Discharge: HOME | End: 2022-03-11
Payer: COMMERCIAL

## 2022-03-09 ENCOUNTER — HOSPITAL ENCOUNTER (OUTPATIENT)
Facility: CLINIC | Age: 62
Discharge: HOME | End: 2022-03-11
Payer: COMMERCIAL

## 2022-03-09 ENCOUNTER — APPOINTMENT (OUTPATIENT)
Dept: OCCUPATIONAL THERAPY | Age: 62
End: 2022-03-09
Payer: COMMERCIAL

## 2022-03-09 DIAGNOSIS — R80.8 OTHER PROTEINURIA: ICD-10-CM

## 2022-03-09 DIAGNOSIS — N18.4 SECONDARY DIABETES MELLITUS WITH STAGE 4 CHRONIC KIDNEY DISEASE (HCC): ICD-10-CM

## 2022-03-09 DIAGNOSIS — D63.1 ANEMIA IN STAGE 4 CHRONIC KIDNEY DISEASE (HCC): ICD-10-CM

## 2022-03-09 DIAGNOSIS — U07.1 COVID: ICD-10-CM

## 2022-03-09 DIAGNOSIS — I12.9 BENIGN HYPERTENSION WITH CKD (CHRONIC KIDNEY DISEASE) STAGE IV (HCC): ICD-10-CM

## 2022-03-09 DIAGNOSIS — E13.22 SECONDARY DIABETES MELLITUS WITH STAGE 4 CHRONIC KIDNEY DISEASE (HCC): ICD-10-CM

## 2022-03-09 DIAGNOSIS — N18.4 BENIGN HYPERTENSION WITH CKD (CHRONIC KIDNEY DISEASE) STAGE IV (HCC): ICD-10-CM

## 2022-03-09 DIAGNOSIS — N18.4 CKD (CHRONIC KIDNEY DISEASE), STAGE IV (HCC): ICD-10-CM

## 2022-03-09 DIAGNOSIS — R05.9 COUGH: ICD-10-CM

## 2022-03-09 DIAGNOSIS — N18.4 ANEMIA IN STAGE 4 CHRONIC KIDNEY DISEASE (HCC): ICD-10-CM

## 2022-03-09 PROCEDURE — 71046 X-RAY EXAM CHEST 2 VIEWS: CPT

## 2022-03-10 ENCOUNTER — HOSPITAL ENCOUNTER (OUTPATIENT)
Age: 62
Setting detail: SPECIMEN
Discharge: HOME | End: 2022-03-10

## 2022-03-10 ENCOUNTER — HOSPITAL ENCOUNTER (OUTPATIENT)
Dept: OCCUPATIONAL THERAPY | Age: 62
Setting detail: THERAPIES SERIES
Discharge: HOME | End: 2022-03-10
Payer: COMMERCIAL

## 2022-03-10 DIAGNOSIS — R80.8 OTHER PROTEINURIA: ICD-10-CM

## 2022-03-10 DIAGNOSIS — N18.4 SECONDARY DIABETES MELLITUS WITH STAGE 4 CHRONIC KIDNEY DISEASE (HCC): ICD-10-CM

## 2022-03-10 DIAGNOSIS — I12.9 BENIGN HYPERTENSION WITH CKD (CHRONIC KIDNEY DISEASE) STAGE IV (HCC): ICD-10-CM

## 2022-03-10 DIAGNOSIS — N18.4 BENIGN HYPERTENSION WITH CKD (CHRONIC KIDNEY DISEASE) STAGE IV (HCC): ICD-10-CM

## 2022-03-10 DIAGNOSIS — N18.4 ANEMIA IN STAGE 4 CHRONIC KIDNEY DISEASE (HCC): ICD-10-CM

## 2022-03-10 DIAGNOSIS — E13.22 SECONDARY DIABETES MELLITUS WITH STAGE 4 CHRONIC KIDNEY DISEASE (HCC): ICD-10-CM

## 2022-03-10 DIAGNOSIS — D63.1 ANEMIA IN STAGE 4 CHRONIC KIDNEY DISEASE (HCC): ICD-10-CM

## 2022-03-10 DIAGNOSIS — N18.4 CKD (CHRONIC KIDNEY DISEASE), STAGE IV (HCC): ICD-10-CM

## 2022-03-10 LAB
ANION GAP SERPL CALCULATED.3IONS-SCNC: 16 MMOL/L (ref 9–17)
BUN BLDV-MCNC: 28 MG/DL (ref 8–23)
CALCIUM SERPL-MCNC: 9.2 MG/DL (ref 8.6–10.4)
CHLORIDE BLD-SCNC: 114 MMOL/L (ref 98–107)
CO2: 14 MMOL/L (ref 20–31)
CREAT SERPL-MCNC: 1.9 MG/DL (ref 0.5–0.9)
GFR AFRICAN AMERICAN: 32 ML/MIN
GFR NON-AFRICAN AMERICAN: 27 ML/MIN
GFR SERPL CREATININE-BSD FRML MDRD: ABNORMAL ML/MIN/{1.73_M2}
GLUCOSE BLD-MCNC: 240 MG/DL (ref 70–99)
POTASSIUM SERPL-SCNC: 5.3 MMOL/L (ref 3.7–5.3)
SODIUM BLD-SCNC: 144 MMOL/L (ref 135–144)

## 2022-03-10 PROCEDURE — 97110 THERAPEUTIC EXERCISES: CPT

## 2022-03-10 ASSESSMENT — PAIN DESCRIPTION - ORIENTATION: ORIENTATION: RIGHT;LEFT

## 2022-03-10 ASSESSMENT — PAIN DESCRIPTION - LOCATION: LOCATION: HAND;FOOT

## 2022-03-10 ASSESSMENT — PAIN DESCRIPTION - DESCRIPTORS: DESCRIPTORS: NUMBNESS

## 2022-03-10 NOTE — PROGRESS NOTES
Occupational [facility]   Rehabilitation Services  Occupational Therapy Treatment Note  Date: 3/10/22  Patient Name: Montserrat Wheatley    MRN: [id number]  Account: [de-identified]   : 1960  (59 y.o.) Gender: female     General  Additional Pertinent Hx: Fistula lt forearm 2021. Stroke Aug 1, 2021  Referring Practitioner: Gracy SHELBY- CNP  Diagnosis: long term memory loss (R41.3), , muscle rt arm weakness (M62.81)  OT Visit Information  OT Insurance Information: [employer]  (after the 20th OT visit - need insurance approval before further appt)  Total # of Visits Approved: 20  Total # of Visits to Date: 2  Subjective  Subjective: Pt states she is tired today. Pt on 2 L O2. Pt had lab work before OT. Comments: Pt at 97% O2, and pulse 77 at beginning of tx  Pain Assessment  Patient Currently in Pain: Denies  Pain Location: Hand,Foot  Pain Orientation: Right,Left  Pain Descriptors: Numbness        Wrist/Hand Exercises  Wrist/Hand Therapy?: Yes  Pre Wrist Flexion Reps/Sets/Weight: wrist over ramp : bilateral  palm up 1# 10x each  Pre Wrist Ext Reps/Sets/Weight: wrist over ramp: bilateral palm down 1# 10x  each  Pre Radial Deviation Reps/Sets/Weight: wrist over ramp: bilateral 1# RD/UD 10x each  Other exercises  Other exercises?: Yes  Other exercises 1: PROM rt UE and hand. rt hand finger blocking exercises 10x each (PIPs, DIPs), edema massage rt hand. Other exercises 2: rt hand based skateboard on table 10x each way: up/down, side to side, circles cw & ccw, figure 8  Other exercises 3: yellow 1.5# digiflex: rt hand gripping 20x, each finger rt hand oppositional pinch 20x, rt thumb lateral pinch 20x  Other exercises 4: bilateral UE AROM 10x each way: alternate rt/lt with UE horizontal abduction/adduction, alternate rt/lt shoulder flex/extension  Other exercises 5: key pegs: using rt hand place 25 pieces in board, then remove pieces & hold in hand.   Other exercises 6: graded clothespins - using rt hand place/remove 6 yellow, 6 red ( light to medium resistance)  Other exercises 7: small yellow therapy ball : 2 sets 20x bilateral hands (press into sides of ball , press into tops of ball)  Assessment  Performance deficits / Impairments: Decreased fine motor control,Decreased high-level IADLs,Decreased strength,Decreased endurance  Assessment: At end tx O2 sats 97% & HR 81.OT discussed pacing with her exercises & taking rest breaks as needed. Pt states she paces with activiites at home. Pt has swelling rt hand so OT begun edema massage. OT tx focus-working on goals of improving rt UE strength, gross motor & fine motor coordination. Pt has difficulty trying to hold key pegs in rt hand after removing them from board. Progressed pt with rt hand fine motor dexterity,strengthening, rt UE wt bearing. Pt reports she feels stretching in rt shoulder,forearm, & hand with the exercises today. Pt's O2 sats wfls. See OT exercises julia details.   Treatment Diagnosis: Rt UE weakness,impaired coordination,impaired sensation hands, decrease endurance. (M62.81,R27.9,R20.2)  Prognosis: Good  REQUIRES OT FOLLOW UP: Yes  Discharge Recommendations: Outpatient OT           Plan  REQUIRES OT FOLLOW UP: Yes  Plan  Times per week: 2x/week  Plan weeks: 20 visits  Current Treatment Recommendations: ROM,Strengthening,Patient/Caregiver Education & Training,Neuromuscular Re-education (coordination, energy conservation)  Plan Comment: continue OT  OT Individual Minutes  Time In: 3063  Time Out: [address]  Minutes: 45  Time Code Minutes   Timed Code Treatment Minutes: 45 Minutes    Electronically signed by Vanessa Mills OT on 3/10/22 at 12:27 PM EST          Treatment Charges:  Minutes Units Time In-Out   Ultrasound      Electrical Stim      Iontophoresis      Paraffin       Massage      Eval      ADL       Ther Exercise 45 3    Ther Activities      Neuro Re-Ed      Splinting       Other      Total Treatment Time:  45 3

## 2022-03-11 LAB
CREAT SERPL-MCNC: 1.9 MG/DL (ref 0.5–0.9)
CREATININE CLEARANCE: 13.8 ML/MIN/BSA (ref 71–151)
CREATININE URINE: 83.1 MG/DL (ref 28–217)
LENGTH OF COLLECTION: 24 H
PATIENT HEIGHT: 164 CM
PATIENT WEIGHT: 113 KG
VOLUME: 569 ML

## 2022-03-14 ENCOUNTER — CARE COORDINATION (OUTPATIENT)
Dept: CARE COORDINATION | Age: 62
End: 2022-03-14

## 2022-03-14 ENCOUNTER — HOSPITAL ENCOUNTER (OUTPATIENT)
Dept: PHYSICAL THERAPY | Age: 62
Setting detail: THERAPIES SERIES
Discharge: HOME | End: 2022-03-14
Payer: COMMERCIAL

## 2022-03-14 ENCOUNTER — APPOINTMENT (OUTPATIENT)
Dept: PHYSICAL THERAPY | Age: 62
End: 2022-03-14
Payer: COMMERCIAL

## 2022-03-14 NOTE — TELEPHONE ENCOUNTER
This is a good pulse ox. She had a normal recent echocardiogram. Normal and recent chest xray. Proceed with PFT and pulmonary follow up as planned. Has seen Dr. Maudine Dakin in the past, recent referral to Dr. Vandana Baez. She may be able to get in with Dr. Renetta Horn sooner as she is established with him. Most likely post covid changes and may warrant additional inhalers, but await PFT result. Is her weight up? Is she urinating ok? Has not had dialysis now for about a month.

## 2022-03-14 NOTE — TELEPHONE ENCOUNTER
I did explain to her that this was a good pulse ox reading she just wanted you to know that she is feeling more SOB. Patient understood your recommendations and stated she is going to be touching base with nephrology regarding her labs and 24 hr urine. She was recently seen by nephrology and they continued to hold dialysis. Weight has been stable, legs have been swollen, urinating often but not much each time and nephrology is aware.

## 2022-03-14 NOTE — CARE COORDINATION
Patient called with concern of SOB. Per patient her O2 is set at 2 liters, she is wearing it all of the time but she feels SOB, pulse ox is currently 93%. She has the PFT and covid testing scheduled that is required before. She will then have an appointment with pulmonology. She is concerned with her SOB and would like PCP recommendations. ACM will route concern to PCP.

## 2022-03-14 NOTE — FLOWSHEET NOTE
[x] [facility] [place] ([facility]) - [facility] & Therapy  [address]  [place]: [phone]   F: [phone]     Physical Therapy Cancel/No Show note    Date: 3/14/2022  Patient: Tre Johnson  : 1960  MRN: [id number]    Visit Count: 1/15  Cancels/No Shows to date:     For today's appointment patient:    [x]  Cancelled    [] Rescheduled appointment    [] No-show     Reason given by patient:    []  Patient ill    []  Conflicting appointment    [] No transportation      [] Conflict with work    [] No reason given    [] Weather related    [] COVID-19    [x] Other:      Comments:  Per , patient has been having issues with O2 and cx today's session.       [] Next appointment was confirmed    Electronically signed by: Sebastian Andrew PTA

## 2022-03-14 NOTE — CARE COORDINATION
Patient notified of PCP recommendations as follows:  Geena Bhat, APRN - CNP         This is a good pulse ox. She had a normal recent echocardiogram. Normal and recent chest xray. Proceed with PFT and pulmonary follow up as planned. Has seen Dr. Arnold Crowe in the past, recent referral to Dr. Luz Evangelista. She may be able to get in with Dr. Yogi Munoz sooner as she is established with him. Most likely post covid changes and may warrant additional inhalers, but await PFT result. Is her weight up? Is she urinating ok? Has not had dialysis now for about a month        Patient understood and stated she is going to be touching base with nephrology regarding her labs and 24 hr urine. Weight has been stable, legs have been swollen and nephrology is aware. Patient asked for supplier of oxygen contact number, [facility] provided her with [facility] number.

## 2022-03-16 ENCOUNTER — HOSPITAL ENCOUNTER (OUTPATIENT)
Dept: PHYSICAL THERAPY | Age: 62
Setting detail: THERAPIES SERIES
Discharge: HOME | End: 2022-03-16
Payer: COMMERCIAL

## 2022-03-16 ENCOUNTER — APPOINTMENT (OUTPATIENT)
Dept: PHYSICAL THERAPY | Age: 62
End: 2022-03-16
Payer: COMMERCIAL

## 2022-03-16 ENCOUNTER — OFFICE VISIT (OUTPATIENT)
Dept: BEHAVIORAL/MENTAL HEALTH CLINIC | Age: 62
End: 2022-03-16
Payer: COMMERCIAL

## 2022-03-16 DIAGNOSIS — F32.9 REACTIVE DEPRESSION: Primary | ICD-10-CM

## 2022-03-16 PROCEDURE — 1036F TOBACCO NON-USER: CPT | Performed by: SOCIAL WORKER

## 2022-03-16 PROCEDURE — 90791 PSYCH DIAGNOSTIC EVALUATION: CPT | Performed by: SOCIAL WORKER

## 2022-03-16 PROCEDURE — 97110 THERAPEUTIC EXERCISES: CPT

## 2022-03-16 NOTE — FLOWSHEET NOTE
[facility]   [address]   Phone: [phone]   Fax: [phone]    Physical Therapy Daily Treatment Note      Date:  3/16/2022  Patient Name:  Roberth Goff    :  1960  MRN: [id number]  Physician: AFSANEH Martini - FRANKY                     Insurance: Shyann   visits -- Ana Lilia Singh after 20 visits   Medical Diagnosis:   M62.81 (ICD-10-CM) - Muscle right arm weakness   I63.9 (ICD-10-CM) - Ischemic stroke of frontal lobe (HCC)   M54.41, M54.42, G89.29 (ICD-10-CM) - Chronic midline low back pain with bilateral sciatica   Rehab Codes: Z74.0 reduced mobility, R53 weakness, R 53. Deconditioning, R29.6 fall risk   Date of symptom onset: initial CVA 21  Next 's appt. : 3/3/22 with neurology      PRECAUTIONS: allergic to adhesive tapes; not allowed to get chest port wet; no lifting or BP to be done with L arm; Dialysis; CHF   Visit# / total visits: 2/15 Cancels/No Shows: 1/0    Vitals:  Before session: 95% 73BPM  Vitals recorded in comments throughout session  End: 98% 78BPM    Subjective:  Patient reporting she is on 3L of O2 coming in to therapy stating she has been having difficulty with maintaining O2  Pain:  [] Yes  [x] No Location: N/A Pain Rating: (0-10 scale) denies/10  Pain altered Tx:  [] No  [] Yes  Action:  Comments:    Objective:  Modalities:   Precautions:  Exercises:  Exercise Reps/ Time Weight/ Level Completed  Today Comments   Seated        LAQ 20x2 2# X 97% 71BPM   Marching 20x2 2# X 98% 72SPM; fatigues quickly and needs frequent rest break during exercise. Hip ABD 20x 5\" hold red X    Resisted PF 20x red X    Sit<>stand  5x2  X BUE support needed; rest needed between each rep. Pt got dizzy and SOB after 3: 94% 79BPM end of first set: 92% 83BPM. 1 ep of LOB 2nd set d/t weakness.   Not able to complete full 2nd set; completed 4x   education 10'   Proper sitting posture to increase lung capacity and to perform proper breathing technique to increase O2 intake. Pacing throughout activities to prevent from fatiguing easily. Other:   10mWT: 23 seconds with rollator and 3L of O2 3/16/22    Access Code: [email or web address]  URL: [email or web address]  Date: 03/16/2022  Prepared by: Patti Shaw    Exercises  Seated Long Arc Quad - 1 x daily - 7 x weekly - 10 reps  Seated March - 1 x daily - 7 x weekly - 10 reps  Seated Hip Abduction with Resistance - 1 x daily - 7 x weekly - 10 reps - 3-5 hold  Seated Ankle Plantar Flexion with Resistance Loop - 1 x daily - 7 x weekly - 10 reps  Sit to Stand - 1 x daily - 7 x weekly - 10 reps      Specific Instructions for next treatment: progress to standing activities as able        Assessment: [x] Progressing toward goals. [] No change. [] Other:    [x] Patient would continue to benefit from skilled physical therapy services in order to: address strength, balance, gait, and endurance to help her gain more capacity and independence with her mobility & ADLs. 3/16: Initial PT session this date with seated HEP provided. Patient required frequent rest breaks due to feeling SOB 2* to patient performing valsalva maneuver during exercises. Educated on proper sitting posture, breathing technique and pacing through activities to prevent from fatiguing easily. Patient had 2 episodes of dizziness during sit<>stand transfers to where patient required a longer rest break. Educated on slow sit<>stand transfers to increase safety and prevent LOB. Patient tolerated overall session well and will continue to progress through POC as pt can tolerate. At the end of session, pt was escorted out to her car for safety due to fatigue and pt feeling SOB. STG: (to be met in 8 treatments)  1. Pt will be able to complete sit to stand from a standard chair without UE support to increase independence to transfer from any surface.    2. Pt will be able to to complete 6 minutes of continuous ambulation with LRAD at mod IND level to progress endurance needed for community ambulation   3. Pt will demonstrate >10 SLS stability with light UE support to safely allow pt to enter/exit her tub shower. 4.  Pt will improve score on BROWN balance scale by >/=6 points to reach minimal detectable change for CVA population to improve overall balance stability and decrease fall risk with mobility. 5.  Pt will improve time on 5xSTS to <20 seconds without use of UEs to decrease fall risk and increase functional capacity for mobility. 6. Pt will increase gait speed to >/=  0.8 m/s with LRAD at mod I level to improve ability to navigate as a household/community ambulator. LTG: (to be met in 15 treatments)  1. Pt will improve score on BROWN balance scale by >/= 45/56 points  to improve overall balance stability and decrease fall risk with mobility. 2.  Pt will improve time on 5xSTS to <15 seconds without use of UEs to decrease fall risk and increase functional capacity for mobility. 3. Pt will be independent with home program addressing strength, flexibility and balance to maximize gains made in therapy to improve overall functional capacity for mobility.   4. Pt will report no falls for x6 weeks demonstrating improved safety with mobility and implementation of fall prevention strategies.   5. Pt will increase score on SIS-16 to >/= 60/80 to demonstrate improved functional levels with ADLs.    6. Pt will increase gait speed to >/=  1.0 m/s with LRAD at mod I level to improve ability to navigate as a community ambulator. 7. Pt will be able to ambulate 20 ft without AD at IND level without LOB to improve mobility to no AD for in the home. Pt. Education:  [x] Yes  [] No  [x] Reviewed Prior HEP/Ed  Method of Education: [x] Verbal  [] Demo  [x] Written  Comprehension of Education:  [x] Verbalizes understanding. [] Demonstrates understanding. [] Needs review.   [] Demonstrates/verbalizes HEP/Ed previously given. Plan: [x] Continue per plan of care.    [] Other:      Treatment Charges: Mins Units   []  Modalities     [x]  Ther Exercise 50 3   []  Manual Therapy     []  Ther Activities     []  Aquatics     []  Neuromuscular     [] Vasocompression     [] Gait Training     [] Dry needling        [] 1 or 2 muscles        [] 3 or more muscles     []  Other     Total Treatment time 50 3     Time In: 1100       Time Out: 1150    Electronically signed by:  Mavis Hartmann PTA

## 2022-03-16 NOTE — PROGRESS NOTES
ADULT BEHAVIORAL HEALTH ASSESSMENT  NIDA Yañez  Licensed Independent        Visit Date: 3/16/2022   Time of appointment: 10:03 AM     Time spent with Patient: 36 minutes. This is patient's first appointment. Reason for Consult:  Depression     PCP:  AFSANEH Marcial CNP      Pt provided informed consent for the behavioral health program. Discussed with patient model of service to include the limits of confidentiality (i.e. abuse reporting, suicide intervention, etc.) and short-term intervention focused approach. Pt indicated understanding. PRESENTING PROBLEM AND HISTORY  Jhony Rust is a 59 y.o. female who presents for new evaluation and treatment of depression. She has the following symptoms: depressed mood, anhedonia, decreased appetite, excessive crying, fatigue/lack of energy, feeling nervous, anxious, or on edge and irritability. Onset of symptoms was approximately several months ago. Symptoms have been gradually worsening since that time. She denies current suicidal and homicidal ideation. Family history significant for no psychiatric illness. Risk factors: negative life event father passed 1/5, frequent hospitalizations and health concerns/changes last 5 years. Previous treatment includes BuSpar and Trazadone. She complains of the following medication side effects: none. MENTAL STATUS EXAM  Mood was within normal limits with sad affect. Suicidal ideation was denied. Homicidal ideation was denied. Hygiene was good . Dress was appropriate. Behavior was Within Normal Limits with Yes observation or self-report of difficulties ambulating. Attitude was Engageable. Eye-contact was good. Speech: rate - WNL, rhythm -  WNL, volume - WNL  Verbalizations were goal directed. Thought processes were intact and goal-oriented without evidence of delusions, hallucinations, obsessions, or chance; with no cognitive distortions.    Associations were characterized by intact cognitive processes. Pt was oriented to person, place, time, and general circumstances;  recent:  good. Insight and judgment were estimated to be good, AEB, a fair  understanding of cyclical maladaptive patterns, and the ability to use insight to inform behavior change. CURRENT MEDICATIONS    Current Outpatient Medications:     acetaminophen (TYLENOL) 325 MG tablet, Take 650 mg by mouth every 6 hours as needed for Pain, Disp: , Rfl:     traZODone (DESYREL) 50 MG tablet, TAKE 1 & 1/2 (ONE & ONE-HALF) TABLETS BY MOUTH EVERY NIGHT, Disp: 45 tablet, Rfl: 0    furosemide (LASIX) 80 MG tablet, Take 1 tablet by mouth 2 times daily, Disp: 60 tablet, Rfl: 0    sodium bicarbonate 650 MG tablet, Take 1 tablet by mouth 3 times daily, Disp: 90 tablet, Rfl: 0    Insulin Pen Needle (EASY TOUCH PEN NEEDLES) 29G X 12MM MISC, 1 each by Does not apply route daily, Disp: 100 each, Rfl: 5    ReliOn Lancets Micro-Thin 33G MISC, USE AS DIRECTED EVERY DAY, Disp: , Rfl:     blood glucose test strips (TRUE METRIX BLOOD GLUCOSE TEST) strip, 1 each by In Vitro route 3 times daily Uncontrolled diabetes, Disp: 100 each, Rfl: 11    HUMALOG 100 UNIT/ML injection vial, , Disp: , Rfl:     insulin glargine (BASAGLAR KWIKPEN) 100 UNIT/ML injection pen, Inject 73 Units into the skin 2 times daily, Disp: 10 pen, Rfl: 0    Blood Glucose Monitoring Suppl (TRUE METRIX GO GLUCOSE METER) w/Device KIT, 1 each by Does not apply route 4 times daily, Disp: 1 kit, Rfl: 1    Lancet Device MISC, 1 Device by Does not apply route once for 1 dose, Disp: 100 each, Rfl: 0    Lancets MISC, 1 each by Does not apply route daily, Disp: 100 each, Rfl: 11    ELIQUIS 5 MG TABS tablet, Take 1 tablet by mouth 2 times daily, Disp: 60 tablet, Rfl: 0    gabapentin (NEURONTIN) 300 MG capsule, Take 1 capsule by mouth 2 times daily for 30 days. , Disp: 60 capsule, Rfl: 0    febuxostat (ULORIC) 40 MG TABS tablet, Take 1 tablet by mouth daily, Disp: 30 tablet, Rfl: 0    docusate sodium (COLACE) 100 MG capsule, Take 1 capsule by mouth 2 times daily, Disp: 60 capsule, Rfl: 0    tamsulosin (FLOMAX) 0.4 MG capsule, Take 1 capsule by mouth daily, Disp: 30 capsule, Rfl: 3    atorvastatin (LIPITOR) 80 MG tablet, Take 1 tablet by mouth daily, Disp: 30 tablet, Rfl: 5    aspirin 81 MG chewable tablet, Take 1 tablet by mouth daily, Disp: 30 tablet, Rfl: 0    ranolazine (RANEXA) 1000 MG extended release tablet, Take 1 tablet by mouth 2 times daily, Disp: 60 tablet, Rfl: 0    busPIRone (BUSPAR) 7.5 MG tablet, Take 1 tablet by mouth 3 times daily, Disp: 90 tablet, Rfl: 0    carvedilol (COREG) 6.25 MG tablet, Take 1 tablet by mouth 2 times daily, Disp: 60 tablet, Rfl: 0    pantoprazole (PROTONIX) 40 MG tablet, Take 1 tablet by mouth every morning (before breakfast), Disp: 30 tablet, Rfl: 0     FAMILY MEDICAL/MH HISTORY   Her family history includes Diabetes in her father; Heart Failure in her father. PATIENT MENTAL HEALTH HISTORY  No previous therapy. PSYCHOSOCIAL HISTORY   Current living situation: Pt reported her mother is living with her and she enjoys spending the time together. 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 Kenzie Kohler shared she is happy that she is back to driving. Support system: Kenzie Kohler shared she has a lot of support; including 3 sisters, nieces, and friends that she talks to. She enjoys residing with her mother and has standing appointments with family on Friday's and Sunday's to enjoy food together and play cards. She was very close with her father who has recently passed, Kenzie Kohler expressed she is struggling with the fact that she didn't get to be with him at the hospital to touch him and couldn't say goodbye. She also misses playing golf with her father as they were very close. Moravian/Spirituality: Pt reported she is Anglican, not attending Pentec[employer] d/t pandemic.        DRUG AND ALCOHOL CURRENT USE/HISTORY  TOBACCO:  She reports that she has never smoked. She has never used smokeless tobacco.  ALCOHOL:  She reports no history of alcohol use. OTHER SUBSTANCES: She reports no history of drug use. ASSESSMENT  Alex Mas presented to the appointment today for evaluation and treatment of symptoms of depression. She is currently deemed no risk to herself or others and meets criteria for Reactive Depression. Estefany's depressive symptoms are well controlled at this time. She will also benefit from brief and solution-focused consultation to address cognitive and behavioral interventions for depressive symptoms. Gray Cruz was in agreement with recommendations. PHQ Scores 3/30/2021   PHQ2 Score 2   PHQ9 Score 2     Interpretation of Total Score Depression Severity: 1-4 = Minimal depression, 5-9 = Mild depression, 10-14 = Moderate depression, 15-19 = Moderately severe depression, 20-27 = Severe depression    How often pt has had thoughts of death or hurting self (if PHQ positive for depression):       No flowsheet data found. Interpretation of AMOL-7 score: 5-9 = mild anxiety, 10-14 = moderate anxiety, 15+ = severe anxiety. Recommend referral to behavioral health for scores 10 or greater. DIAGNOSIS  Gray Cruz was seen today for depression. Diagnoses and all orders for this visit:    Reactive depression        INTERVENTION  Conducted functional assessment, Hull-setting to identify pt's primary goals for [facility] visit / overall health and Supportive techniques. PLAN  Gray Cruz reported her goal for treatment as the following; \"I just Jonathan Ericr feel better inside, I just feel very lost. I would like to be able to tone that down and enjoy things\" Follow-up scheduled 4/6 @ 2 PM. Offered grief support groups for additional support. INTERACTIVE COMPLEXITY  Is interactive complexity present?   No  Reason:  N/A  Additional Supporting Information:  N/A       Electronically signed by NIDA Witt on 3/16/2022 at 12:17 PM

## 2022-03-17 ENCOUNTER — CARE COORDINATION (OUTPATIENT)
Dept: CARE COORDINATION | Age: 62
End: 2022-03-17

## 2022-03-17 NOTE — CARE COORDINATION
Ambulatory Care Coordination Note  3/17/2022  CM Risk Score: 7  Charlson 10 Year Mortality Risk Score: 100%     ACC: Marylin Sanchez RN    Summary Note: Patient reports her breathing has improved slightly. She plans to move forward with the PFT . She denied any needs today. Heart Failure Education outreach Date/Time: 3/17/2022 3:18 PM    Ambulatory Care Manager (ACM) contacted the patient by telephone to perform Ambulatory Care Coordination. Verified name and  with patient as identifiers. Provided introduction to self, and explanation of the Ambulatory Care Manager's role. ACM reviewed that a Health Healthy tips for the spring packet has been sent to [employer]. ACM reviewed CHF zones, daily weights, fluid restriction, the importance of low sodium diet and healthy tips packet with the patient. Instructed patient to call their PCP if they have a weight gain of 3 lbs in 2 days or 5 lbs in a week. Patient reminded that there is a physician on call 24 hours a day / 7 days a week should the patient have questions or concerns. The patient verbalized understanding. ACM will follow up in 2 weeks. Care Coordination Interventions    Program Enrollment: Complex Care  Referral from Primary Care Provider: Yes  Suggested Interventions and Community Resources  Diabetes Education: In Process  Physical Therapy: Completed  Transportation Support: Declined  Zone Management Tools: In Process         Goals Addressed                 This Visit's Progress     Conditions and Symptoms   On track     I will schedule office visits, as directed by my provider. I will keep my appointment or reschedule if I have to cancel. I will notify my provider of any barriers to my plan of care. I will follow my Zone Management tool to seek urgent or emergent care. I will notify my provider of any symptoms that indicate a worsening of my condition.     Barriers: overwhelmed by complexity of regimen  Plan for overcoming my barriers: work with ACM   Confidence: 9/10  Anticipated Goal Completion Date: 4.26.22              Prior to Admission medications    Medication Sig Start Date End Date Taking? Authorizing Provider   acetaminophen (TYLENOL) 325 MG tablet Take 650 mg by mouth every 6 hours as needed for Pain    Historical Provider, MD   traZODone (DESYREL) 50 MG tablet TAKE 1 & 1/2 (ONE & ONE-HALF) TABLETS BY MOUTH EVERY NIGHT 3/8/22   AFSANEH Vyas CNP   furosemide (LASIX) 80 MG tablet Take 1 tablet by mouth 2 times daily 3/8/22   AFSANEH Vyas CNP   sodium bicarbonate 650 MG tablet Take 1 tablet by mouth 3 times daily 3/2/22   [facility], MD   Insulin Pen Needle (EASY TOUCH PEN NEEDLES) 29G X 12MM MISC 1 each by Does not apply route daily 3/1/22   AFSANEH Vyas CNP   ReliOn Lancets Micro-Thin 33G MISC USE AS DIRECTED EVERY DAY 1/28/22   Historical Provider, MD   blood glucose test strips (TRUE METRIX BLOOD GLUCOSE TEST) strip 1 each by In Vitro route 3 times daily Uncontrolled diabetes 2/2/22 3/11/22  AFSANEH Vyas CNP   HUMALOG 100 UNIT/ML injection vial  11/29/21   Historical Provider, MD   insulin glargine (BASAGLAR KWIKPEN) 100 UNIT/ML injection pen Inject 73 Units into the skin 2 times daily 11/30/21   AFSANEH Vyas CNP   Blood Glucose Monitoring Suppl (TRUE METRIX GO GLUCOSE METER) w/Device KIT 1 each by Does not apply route 4 times daily 11/30/21   AFSANEH Vyas CNP   Lancet Device MISC 1 Device by Does not apply route once for 1 dose 11/30/21 11/30/21  AFSANEH Vyas CNP   Lancets MISC 1 each by Does not apply route daily 11/30/21   AFSANEH Vyas CNP   ELIQUIS 5 MG TABS tablet Take 1 tablet by mouth 2 times daily 11/22/21   Sheela Morales MD   gabapentin (NEURONTIN) 300 MG capsule Take 1 capsule by mouth 2 times daily for 30 days.  11/22/21 3/11/22  Sheela Morales MD   febuxostat (ULORIC) 40 MG TABS tablet Take 1 tablet by mouth daily 11/22/21   Sheela Morales MD   docusate sodium (COLACE) 100 MG capsule Take 1 capsule by mouth 2 times daily 11/22/21   Rajesh Rodrigues MD   tamsulosin [facility]) 0.4 MG capsule Take 1 capsule by mouth daily 11/23/21   Rajesh Rodrigues MD   atorvastatin (LIPITOR) 80 MG tablet Take 1 tablet by mouth daily 11/3/21   AFSANEH Carbajal CNP   aspirin 81 MG chewable tablet Take 1 tablet by mouth daily 9/25/21   Myra Mark MD   ranolazine (RANEXA) 1000 MG extended release tablet Take 1 tablet by mouth 2 times daily 9/25/21   Myra Mark MD   busPIRone (BUSPAR) 7.5 MG tablet Take 1 tablet by mouth 3 times daily 9/25/21   Myra Mark MD   carvedilol (COREG) 6.25 MG tablet Take 1 tablet by mouth 2 times daily 9/25/21   Myra Mark MD   pantoprazole (PROTONIX) 40 MG tablet Take 1 tablet by mouth every morning (before breakfast) 9/25/21   Myra Mark MD   allopurinol (ZYLOPRIM) 100 MG tablet Take 1 tablet by mouth daily 4/14/21   AFSANEH Carbajal CNP       Future Appointments   Date Time Provider Balbir Palacios   3/18/2022  3:20 PM SCHEDULE, [facility] COVID SCREENING Claudia Barnes 40   3/21/2022 11:00 AM Clevester Edda, PT [facility] MOB PT Lori Carmen   3/22/2022 12:45  [facility] RESP THERAPY  [facility] PFT [address] [facility]   3/24/2022 11:15 AM Clevester Edda, PT [facility] MOB PT Lori Carmen   3/28/2022 11:15 AM Clevester Edda, PT [facility] PT Lori Carmen   4/1/2022  1:45 PM Mavis Days, PTA [facility]OB PT Lori Carmen   4/1/2022  2:30 PM Mahad Me, OT [facility] MOB OT Lori Carmen   4/4/2022  1:45 PM Mahad Me, OT [address]   4/4/2022  2:30 PM Clevester Edda, PT [address]   4/6/2022  2:00 PM Maya Lopez Domingo MEAKARIME LESLIEY [facility]   4/7/2022  1:00 PM Mahad Lewis, OT [facility] MOB OT Lori Carmen   4/7/2022  1:45 PM César Bañuelos, PT [facility] MOB PT Lori Carmen   4/25/2022 11:00 AM Jairo Cherry MD [facility] HEART/VAS Imtiaz Bars   4/28/2022  9:40 AM Godfrey Simon MD Neuro Spec [facility]     ,   Diabetes Assessment    Meal Planning: Other No patient-reported symptoms      ,   Congestive Heart Failure Assessment    Are you currently restricting fluids?: Other  Do you understand a low sodium diet?: Yes  Do you understand how to read food labels?: Yes     No patient-reported symptoms      Symptoms:     Symptom course: stable  Salt intake watch compared to last visit: stable      and   General Assessment    Do you have any symptoms that are causing concern?: No

## 2022-03-18 ENCOUNTER — HOSPITAL ENCOUNTER (OUTPATIENT)
Dept: LAB | Age: 62
Setting detail: SPECIMEN
Discharge: HOME | End: 2022-03-18
Payer: COMMERCIAL

## 2022-03-18 DIAGNOSIS — Z01.818 PREOP TESTING: Primary | ICD-10-CM

## 2022-03-18 PROCEDURE — U0005 INFEC AGEN DETEC AMPLI PROBE: HCPCS

## 2022-03-18 PROCEDURE — U0003 INFECTIOUS AGENT DETECTION BY NUCLEIC ACID (DNA OR RNA); SEVERE ACUTE RESPIRATORY SYNDROME CORONAVIRUS 2 (SARS-COV-2) (CORONAVIRUS DISEASE [COVID-19]), AMPLIFIED PROBE TECHNIQUE, MAKING USE OF HIGH THROUGHPUT TECHNOLOGIES AS DESCRIBED BY CMS-2020-01-R: HCPCS

## 2022-03-20 LAB
SARS-COV-2: NORMAL
SARS-COV-2: NOT DETECTED
SOURCE: NORMAL

## 2022-03-21 ENCOUNTER — APPOINTMENT (OUTPATIENT)
Dept: PHYSICAL THERAPY | Age: 62
End: 2022-03-21
Payer: COMMERCIAL

## 2022-03-21 ENCOUNTER — HOSPITAL ENCOUNTER (OUTPATIENT)
Dept: PHYSICAL THERAPY | Age: 62
Setting detail: THERAPIES SERIES
Discharge: HOME | End: 2022-03-21
Payer: COMMERCIAL

## 2022-03-21 PROCEDURE — 97110 THERAPEUTIC EXERCISES: CPT

## 2022-03-21 NOTE — FLOWSHEET NOTE
[facility]   [address]   Phone: [phone]   Fax: [phone]    Physical Therapy Daily Treatment Note      Date:  3/21/2022  Patient Name:  Emmett Escalante    :  1960  MRN: [id number]  Physician: AFSANEH Fonseca - CNP                     Insurance: Patsi Oats  visits -- Sarah Edwards after 20 visits   Medical Diagnosis:   M62.81 (ICD-10-CM) - Muscle right arm weakness   I63.9 (ICD-10-CM) - Ischemic stroke of frontal lobe (HCC)   M54.41, M54.42, G89.29 (ICD-10-CM) - Chronic midline low back pain with bilateral sciatica   Rehab Codes: Z74.0 reduced mobility, R53 weakness, R 53. Deconditioning, R29.6 fall risk   Date of symptom onset: initial CVA 21  Next 's appt. : 3/3/22 with neurology      PRECAUTIONS: allergic to adhesive tapes; not allowed to get chest port wet; no lifting or BP to be done with L arm; Dialysis; CHF   Visit# / total visits: 3/15 Cancels/No Shows:     Vitals: *on 3L supplemental 02  Before session: 95% 81BPM  Vitals recorded in comments throughout session      Subjective:  Patient reporting she is on 3L of O2 coming in to therapy. She arrives in a WC due to weakness and breathing difficulties. Denies any changes. She will be having her pulmonary function test tomorrow. Pain:  [] Yes  [x] No Location: N/A Pain Rating: (0-10 scale) denies/10  Pain altered Tx:  [] No  [] Yes  Action:  Comments:    Objective:  Modalities:   Precautions:  Exercises:  Exercise Reps/ Time Weight/ Level Completed  Today Comments   Seated        LAQ 20x ea 2# X Drops to 80% post with labored breathing, encouraged pursed lip breathing; recovers to 94% quickly    Marching 20x2, altn 2# X fatigues quickly and needs frequent rest break during exercise. Hip ABD 20x 5\" hold red X    Resisted PF/DF 20x red X    Sit<>stand  2x4  X BUE support needed; rest needed between each rep.  After 4 reps becomes SOB, 83% SpO2, 101bpm; allowed for rest time with pursed lip breathing    education 10'   Proper sitting posture to increase lung capacity and to perform proper breathing technique to increase O2 intake. Pacing throughout activities to prevent from fatiguing easily. Standing marches  2x10 altn  x Light UE support; stats 90-91%    Standing hip abd 2x10 ea  x Light UE support; Stats 85% with quick recovery to 89-90%    Standing hip extension 2x10ea  x Light UE support           nustep  6' Level 1  x Remains at 90% spO2 when checked at 3 minutes and post                  Other:   3/16: 10mWT: 23 seconds with rollator and 3L of O2  3/16: Exercises- Access Code: [email or web address]  Seated Long Arc Quad - 1 x daily - 7 x weekly - 10 reps  Seated March - 1 x daily - 7 x weekly - 10 reps  Seated Hip Abduction with Resistance - 1 x daily - 7 x weekly - 10 reps - 3-5 hold  Seated Ankle Plantar Flexion with Resistance Loop - 1 x daily - 7 x weekly - 10 reps  Sit to Stand - 1 x daily - 7 x weekly - 10 reps      Specific Instructions for next treatment: progress to standing activities as able, continued to monitor vitals, follow up on pulmonary function testing       Assessment: [x] Progressing toward goals. Pt continues to be significantly limited in endurance needing frequent rest breaks during exercises. She has very labored breathing with drops in SpO2 (lowest 81%). Educated on pursed lip breathing to improve respiration cycle to prevent hyperventilation. Pt does drop significantly as she hyperventilates but improves quickly (within 1 minute). Able to add standing strengthening to the porgram with good tolerance. Pt only destating significantly with sit to stands. Ended with nustep with pt able to complete with improved tolerance with no significant drop in oxygen. Assisted pt to her car. [] No change.       [] Other:    [x] Patient would continue to benefit from skilled physical therapy services in order to: address strength, balance, gait, and endurance to help her gain more capacity and independence with her mobility & ADLs. STG: (to be met in 8 treatments)  1. Pt will be able to complete sit to stand from a standard chair without UE support to increase independence to transfer from any surface. 2. Pt will be able to to complete 6 minutes of continuous ambulation with LRAD at mod IND level to progress endurance needed for community ambulation   3. Pt will demonstrate >10 SLS stability with light UE support to safely allow pt to enter/exit her tub shower. 4.  Pt will improve score on BROWN balance scale by >/=6 points to reach minimal detectable change for CVA population to improve overall balance stability and decrease fall risk with mobility. 5.  Pt will improve time on 5xSTS to <20 seconds without use of UEs to decrease fall risk and increase functional capacity for mobility. 6. Pt will increase gait speed to >/=  0.8 m/s with LRAD at mod I level to improve ability to navigate as a household/community ambulator. LTG: (to be met in 15 treatments)  1. Pt will improve score on BROWN balance scale by >/= 45/56 points  to improve overall balance stability and decrease fall risk with mobility. 2.  Pt will improve time on 5xSTS to <15 seconds without use of UEs to decrease fall risk and increase functional capacity for mobility. 3. Pt will be independent with home program addressing strength, flexibility and balance to maximize gains made in therapy to improve overall functional capacity for mobility.   4. Pt will report no falls for x6 weeks demonstrating improved safety with mobility and implementation of fall prevention strategies.   5. Pt will increase score on SIS-16 to >/= 60/80 to demonstrate improved functional levels with ADLs.    6. Pt will increase gait speed to >/=  1.0 m/s with LRAD at mod I level to improve ability to navigate as a community ambulator.    7. Pt will be able to ambulate 20 ft without AD at IND level without LOB to improve mobility to no AD for in the home. Pt. Education:  [x] Yes  [] No  [x] Reviewed Prior HEP/Ed  Method of Education: [x] Verbal  [] Demo  [x] Written  Comprehension of Education:  [x] Verbalizes understanding. [] Demonstrates understanding. [] Needs review. [] Demonstrates/verbalizes HEP/Ed previously given. Plan: [x] Continue per plan of care.    [] Other:      Treatment Charges: Mins Units   []  Modalities     [x]  Ther Exercise 57 4   []  Manual Therapy     []  Ther Activities     []  Aquatics     []  Neuromuscular     [] Vasocompression     [] Gait Training     [] Dry needling        [] 1 or 2 muscles        [] 3 or more muscles     []  Other     Total Treatment time 57 4     Time In: 8773      Time Out: 1200    Electronically signed by:  Catracho Alcala PT

## 2022-03-22 ENCOUNTER — HOSPITAL ENCOUNTER (OUTPATIENT)
Dept: PULMONOLOGY | Age: 62
Discharge: HOME | End: 2022-03-22
Payer: COMMERCIAL

## 2022-03-22 DIAGNOSIS — R09.02 HYPOXEMIA: ICD-10-CM

## 2022-03-22 DIAGNOSIS — I50.32 CHRONIC DIASTOLIC HEART FAILURE (HCC): ICD-10-CM

## 2022-03-22 DIAGNOSIS — Z99.81 OXYGEN DEPENDENT: ICD-10-CM

## 2022-03-22 LAB
DLCO %PRED: NORMAL
DLCO PRED: NORMAL
DLCO/VA %PRED: NORMAL
DLCO/VA PRED: NORMAL
DLCO/VA: NORMAL
DLCO: NORMAL
EXPIRATORY TIME: NORMAL
FEF 25-75% %PRED-PRE: NORMAL
FEF 25-75% PRED: NORMAL
FEF 25-75%-PRE: NORMAL
FEV1 %PRED-PRE: NORMAL
FEV1 PRED: NORMAL
FEV1/FVC %PRED-PRE: NORMAL
FEV1/FVC PRED: NORMAL
FEV1/FVC: NORMAL
FEV1: NORMAL
FVC %PRED-PRE: NORMAL
FVC PRED: NORMAL
FVC: NORMAL
GAW %PRED: NORMAL
GAW PRED: NORMAL
GAW: NORMAL
IC %PRED: NORMAL
IC PRED: NORMAL
IC: NORMAL
MVV %PRED-PRE: NORMAL
MVV PRED: NORMAL
MVV-PRE: NORMAL
PEF %PRED-PRE: NORMAL
PEF PRED: NORMAL
PEF-PRE: NORMAL
RAW %PRED: NORMAL
RAW PRED: NORMAL
RAW: NORMAL
RV %PRED: NORMAL
RV PRED: NORMAL
RV: NORMAL
SVC %PRED: NORMAL
SVC PRED: NORMAL
SVC: NORMAL
TLC %PRED: NORMAL
TLC PRED: NORMAL
TLC: NORMAL
VA %PRED: NORMAL
VA PRED: NORMAL
VA: NORMAL
VTG %PRED: NORMAL
VTG PRED: NORMAL
VTG: NORMAL

## 2022-03-22 PROCEDURE — 94726 PLETHYSMOGRAPHY LUNG VOLUMES: CPT

## 2022-03-22 PROCEDURE — 94060 EVALUATION OF WHEEZING: CPT

## 2022-03-22 PROCEDURE — 94664 DEMO&/EVAL PT USE INHALER: CPT

## 2022-03-22 PROCEDURE — 94375 RESPIRATORY FLOW VOLUME LOOP: CPT

## 2022-03-22 PROCEDURE — 94729 DIFFUSING CAPACITY: CPT

## 2022-03-24 ENCOUNTER — APPOINTMENT (OUTPATIENT)
Dept: PHYSICAL THERAPY | Age: 62
End: 2022-03-24
Payer: COMMERCIAL

## 2022-03-24 ENCOUNTER — HOSPITAL ENCOUNTER (OUTPATIENT)
Dept: PHYSICAL THERAPY | Age: 62
Setting detail: THERAPIES SERIES
Discharge: HOME | End: 2022-03-24
Payer: COMMERCIAL

## 2022-03-24 NOTE — FLOWSHEET NOTE
[] [address]  [place]: [phone]   F: [phone]     Physical Therapy Cancel/No Show note    Date: 3/24/2022  Patient: Montserrat Wheatley  : 1960  MRN: [id number]    Visit Count: 3/15  Cancels/No Shows to date:     For today's appointment patient:    [x]  Cancelled    [] Rescheduled appointment    [] No-show     Reason given by patient:    [x]  Patient ill    []  Conflicting appointment    [] No transportation      [] Conflict with work    [] No reason given    [] Weather related    [] LAOGA-40    [] Other:      Comments:        [x] Next appointment was confirmed    Electronically signed by: Hai Castaneda PT

## 2022-03-25 ENCOUNTER — CARE COORDINATION (OUTPATIENT)
Dept: CARE COORDINATION | Age: 62
End: 2022-03-25

## 2022-03-25 NOTE — TELEPHONE ENCOUNTER
The PFT result is not completed yet. They have not completed with the full impression. We can call for that result but it can take up to a week or more to get that result.

## 2022-03-28 ENCOUNTER — HOSPITAL ENCOUNTER (OUTPATIENT)
Dept: PHYSICAL THERAPY | Age: 62
Setting detail: THERAPIES SERIES
Discharge: HOME | End: 2022-03-28
Payer: COMMERCIAL

## 2022-03-28 ENCOUNTER — APPOINTMENT (OUTPATIENT)
Dept: PHYSICAL THERAPY | Age: 62
End: 2022-03-28
Payer: COMMERCIAL

## 2022-03-28 PROCEDURE — 97110 THERAPEUTIC EXERCISES: CPT

## 2022-03-28 NOTE — FLOWSHEET NOTE
[facility]   [address]   Phone: [phone]   Fax: [phone]    Physical Therapy Daily Treatment Note      Date:  3/28/2022  Patient Name:  Emelina Painting    :  1960  MRN: [id number]  Physician: AFSANEH Hodge - FRANKY                     Insurance: Raymon Oconnor  visits -- Kyle Torres after 20 visits   Medical Diagnosis:   M62.81 (ICD-10-CM) - Muscle right arm weakness   I63.9 (ICD-10-CM) - Ischemic stroke of frontal lobe (HCC)   M54.41, M54.42, G89.29 (ICD-10-CM) - Chronic midline low back pain with bilateral sciatica   Rehab Codes: Z74.0 reduced mobility, R53 weakness, R 53. Deconditioning, R29.6 fall risk   Date of symptom onset: initial CVA 21  Next 's appt. : 3/3/22 with neurology      PRECAUTIONS: allergic to adhesive tapes; not allowed to get chest port wet; no lifting or BP to be done with L arm; Dialysis; CHF   Visit# / total visits: 4/15 Cancels/No Shows: 2/0    Vitals: *on 3L supplemental 02  Before session: 98% 82BPM  Vitals recorded in comments throughout session      Subjective:  Patient reporting she is on 3L of O2 coming in to therapy. She arrives in a WC due to weakness and breathing difficulties. States that her port is causing more pain. May be starting dialysis too. Pain:  [x] Yes  [] No Location: L port  Pain Rating: (0-10 scale) 5-6/10  Pain altered Tx:  [] No  [] Yes  Action:  Comments:    Objective:  Modalities:   Precautions:  Exercises:  Exercise Reps/ Time Weight/ Level Completed  Today Comments   Seated        LAQ 20x ea 2# X    Marching 20x2, altn Red  X fatigues quickly and needs frequent rest break during exercise; post exercise 96%, 87bpm.   Hip ABD 20x 5\" hold red X     seated HS curls  2x10 ea   x    Resisted PF/DF 2x15 red X    Resisted whole leg extensions 2x10  red x After set 1 94%, 84 bpm    Sit<>stand  2x4   BUE support needed; rest needed between each rep.  After 4 reps becomes SOB, 83% SpO2, 101bpm; allowed for rest time with pursed lip breathing    education 10'   Proper sitting posture to increase lung capacity and to perform proper breathing technique to increase O2 intake. Pacing throughout activities to prevent from fatiguing easily. Standing marches  2x10 altn  x Light UE support; stats 93%,    Standing hip abd x10 ea  x Light UE support;    Standing hip extension x10ea  x Light UE support    Standing SLR x10 ea  x Light UE support    nustep  6' Level 2  x Remains at 91% spO2, HR 92 when checked at 3 minutes and post                  Other:   3/16: 10mWT: 23 seconds with rollator and 3L of O2  3/16: Exercises- Access Code: [email or web address]  Seated Long Arc Quad - 1 x daily - 7 x weekly - 10 reps  Seated March - 1 x daily - 7 x weekly - 10 reps  Seated Hip Abduction with Resistance - 1 x daily - 7 x weekly - 10 reps - 3-5 hold  Seated Ankle Plantar Flexion with Resistance Loop - 1 x daily - 7 x weekly - 10 reps  Sit to Stand - 1 x daily - 7 x weekly - 10 reps      Specific Instructions for next treatment: progress to standing activities as able, continued to monitor vitals, follow up on pulmonary function testing       Assessment: [x] Progressing toward goals. Pt continues to be significantly limited in endurance needing frequent rest breaks during exercises. Stats much better with Sp02 remaining in the 90s all session. Pt having new R sided chest/arm pain near her port. This limits her tolerance to changing positions for sit to stands. Pain increases with push off can controlling decent. Limited the amount of position changes due to this. Held on sit to stands and added whole LE extension. Ended with nustep with pt able to complete with improved tolerance with no significant drop in oxygen. [] No change.       [] Other:    [x] Patient would continue to benefit from skilled physical therapy services in order to: address strength, balance, gait, and endurance to help her gain more capacity and independence with her mobility & ADLs. STG: (to be met in 8 treatments)  1. Pt will be able to complete sit to stand from a standard chair without UE support to increase independence to transfer from any surface. 2. Pt will be able to to complete 6 minutes of continuous ambulation with LRAD at mod IND level to progress endurance needed for community ambulation   3. Pt will demonstrate >10 SLS stability with light UE support to safely allow pt to enter/exit her tub shower. 4.  Pt will improve score on BROWN balance scale by >/=6 points to reach minimal detectable change for CVA population to improve overall balance stability and decrease fall risk with mobility. 5.  Pt will improve time on 5xSTS to <20 seconds without use of UEs to decrease fall risk and increase functional capacity for mobility. 6. Pt will increase gait speed to >/=  0.8 m/s with LRAD at mod I level to improve ability to navigate as a household/community ambulator. LTG: (to be met in 15 treatments)  1. Pt will improve score on BROWN balance scale by >/= 45/56 points  to improve overall balance stability and decrease fall risk with mobility. 2.  Pt will improve time on 5xSTS to <15 seconds without use of UEs to decrease fall risk and increase functional capacity for mobility. 3. Pt will be independent with home program addressing strength, flexibility and balance to maximize gains made in therapy to improve overall functional capacity for mobility.   4. Pt will report no falls for x6 weeks demonstrating improved safety with mobility and implementation of fall prevention strategies.   5. Pt will increase score on SIS-16 to >/= 60/80 to demonstrate improved functional levels with ADLs.    6. Pt will increase gait speed to >/=  1.0 m/s with LRAD at mod I level to improve ability to navigate as a community ambulator.    7. Pt will be able to ambulate 20 ft without AD at IND level without LOB to improve mobility to no AD for in the home. Pt. Education:  [x] Yes  [] No  [x] Reviewed Prior HEP/Ed  Method of Education: [x] Verbal  [] Demo  [x] Written  Comprehension of Education:  [x] Verbalizes understanding. [] Demonstrates understanding. [] Needs review. [] Demonstrates/verbalizes HEP/Ed previously given. Plan: [x] Continue per plan of care.    [] Other:      Treatment Charges: Mins Units   []  Modalities     [x]  Ther Exercise 42 3   []  Manual Therapy     []  Ther Activities     []  Aquatics     []  Neuromuscular     [] Vasocompression     [] Gait Training     [] Dry needling        [] 1 or 2 muscles        [] 3 or more muscles     []  Other     Total Treatment time 42 3     Time In: 8466      Time Out: 3421     Electronically signed by:  César Bañuelos PT

## 2022-03-29 ENCOUNTER — CARE COORDINATION (OUTPATIENT)
Dept: CARE COORDINATION | Age: 62
End: 2022-03-29

## 2022-03-29 NOTE — CARE COORDINATION
Patient called ACM stating her breathing has been more difficult today. She reports her pulse ox is between 93-97%. ACM explained that this is within normal limits and educated patient on pulse ox parameters. Patient understood and asked about her PFT results. ACM will ask PCP if she has final results yet and if patient needs to follow through with pulmonology.    ACM will forward concern to PCP

## 2022-03-29 NOTE — TELEPHONE ENCOUNTER
The PFT results is not complete. The results are in there, there is no impression. The impression can take jeff to come back. It is not unusual that is not back yet. We are waiting for a pulmonologist to read it. I know we have asked this question as well, is she set up to see a pulmonologist.  She has been referred to both Dr. Adriana Sage and Dr. Sidra Hodge. I am not sure who she is actually going to see. Pulse oximetry readings that she is sharing are normal.  We can go ahead and send in an inhaler that she would use every single day. Continue with the albuterol inhaler to be used as needed for shortness of breath. Is she using the albuterol inhaler at all.

## 2022-03-30 ENCOUNTER — CARE COORDINATION (OUTPATIENT)
Dept: CARE COORDINATION | Age: 62
End: 2022-03-30

## 2022-03-30 ENCOUNTER — HOSPITAL ENCOUNTER (OUTPATIENT)
Age: 62
Setting detail: SPECIMEN
Discharge: HOME | End: 2022-03-30

## 2022-03-30 ENCOUNTER — TELEPHONE (OUTPATIENT)
Dept: VASCULAR SURGERY | Age: 62
End: 2022-03-30

## 2022-03-30 DIAGNOSIS — N18.4 ANEMIA IN STAGE 4 CHRONIC KIDNEY DISEASE (HCC): ICD-10-CM

## 2022-03-30 DIAGNOSIS — Z13.9 ENCOUNTER FOR HEALTH-RELATED SCREENING: ICD-10-CM

## 2022-03-30 DIAGNOSIS — N18.4 SECONDARY DIABETES MELLITUS WITH STAGE 4 CHRONIC KIDNEY DISEASE (HCC): ICD-10-CM

## 2022-03-30 DIAGNOSIS — E13.22 SECONDARY DIABETES MELLITUS WITH STAGE 4 CHRONIC KIDNEY DISEASE (HCC): ICD-10-CM

## 2022-03-30 DIAGNOSIS — I12.9 BENIGN HYPERTENSION WITH CKD (CHRONIC KIDNEY DISEASE) STAGE IV (HCC): ICD-10-CM

## 2022-03-30 DIAGNOSIS — E87.5 HYPERKALEMIA: ICD-10-CM

## 2022-03-30 DIAGNOSIS — D63.1 ANEMIA IN STAGE 4 CHRONIC KIDNEY DISEASE (HCC): ICD-10-CM

## 2022-03-30 DIAGNOSIS — N18.4 CKD (CHRONIC KIDNEY DISEASE), STAGE IV (HCC): ICD-10-CM

## 2022-03-30 DIAGNOSIS — N18.4 BENIGN HYPERTENSION WITH CKD (CHRONIC KIDNEY DISEASE) STAGE IV (HCC): ICD-10-CM

## 2022-03-30 DIAGNOSIS — Z11.59 ENCOUNTER FOR HEPATITIS C SCREENING TEST FOR LOW RISK PATIENT: ICD-10-CM

## 2022-03-30 LAB
-: ABNORMAL
BACTERIA: ABNORMAL
BILIRUBIN URINE: NEGATIVE
COLOR: YELLOW
EPITHELIAL CELLS UA: ABNORMAL /HPF (ref 0–5)
GLUCOSE URINE: ABNORMAL
KETONES, URINE: ABNORMAL
LEUKOCYTE ESTERASE, URINE: ABNORMAL
NITRITE, URINE: NEGATIVE
PH UA: 5 (ref 5–8)
PROTEIN UA: ABNORMAL
RBC UA: ABNORMAL /HPF (ref 0–2)
SPECIFIC GRAVITY UA: 1.03 (ref 1–1.03)
TURBIDITY: ABNORMAL
URINE HGB: ABNORMAL
UROBILINOGEN, URINE: NORMAL
WBC UA: ABNORMAL /HPF (ref 0–5)
YEAST: ABNORMAL

## 2022-03-30 NOTE — CARE COORDINATION
ACM spoke with patient and reviewed PCP recommendations as follows: The PFT results is not complete. The results are in there, there is no impression. The impression can take jeff to come back. It is not unusual that is not back yet. We are waiting for a pulmonologist to read it. I know we have asked this question as well, is she set up to see a pulmonologist.  She has been referred to both Dr. Adriana Sage and Dr. Sidra Hodge. I am not sure who she is actually going to see. Pulse oximetry readings that she is sharing are normal.  We can go ahead and send in an inhaler that she would use every single day. Continue with the albuterol inhaler to be used as needed for shortness of breath. Is she using the albuterol inhaler at all. Patient has not been using her inhaler but will start. She understands that she should get on the pulmonologist schedule, she has been waiting on the PFT results. She will call Dr. Pio Phillip office today. She understands that her pulse ox is within normal limits but still feels SOB, she will try the inhaler. ACM will check on patient in 3-5 days.

## 2022-03-31 LAB
ABSOLUTE EOS #: 0.13 K/UL (ref 0–0.44)
ABSOLUTE IMMATURE GRANULOCYTE: 0.03 K/UL (ref 0–0.3)
ABSOLUTE LYMPH #: 0.73 K/UL (ref 1.1–3.7)
ABSOLUTE MONO #: 0.41 K/UL (ref 0.1–1.2)
ANION GAP SERPL CALCULATED.3IONS-SCNC: 17 MMOL/L (ref 9–17)
BASOPHILS # BLD: 1 % (ref 0–2)
BASOPHILS ABSOLUTE: 0.04 K/UL (ref 0–0.2)
BUN BLDV-MCNC: 22 MG/DL (ref 8–23)
CALCIUM SERPL-MCNC: 9.2 MG/DL (ref 8.6–10.4)
CHLORIDE BLD-SCNC: 102 MMOL/L (ref 98–107)
CO2: 15 MMOL/L (ref 20–31)
CREAT SERPL-MCNC: 2.09 MG/DL (ref 0.5–0.9)
EOSINOPHILS RELATIVE PERCENT: 2 % (ref 1–4)
GFR AFRICAN AMERICAN: 29 ML/MIN
GFR NON-AFRICAN AMERICAN: 24 ML/MIN
GFR SERPL CREATININE-BSD FRML MDRD: ABNORMAL ML/MIN/{1.73_M2}
GLUCOSE BLD-MCNC: 662 MG/DL (ref 70–99)
HBV SURFACE AB TITR SER: 60.08 MIU/ML
HCT VFR BLD CALC: 36.7 % (ref 36.3–47.1)
HEMOGLOBIN: 11.6 G/DL (ref 11.9–15.1)
HEPATITIS B CORE TOTAL ANTIBODY: NONREACTIVE
HEPATITIS B SURFACE ANTIGEN: NONREACTIVE
HEPATITIS C ANTIBODY: NONREACTIVE
IMMATURE GRANULOCYTES: 0 %
LYMPHOCYTES # BLD: 10 % (ref 24–43)
MAGNESIUM: 2.1 MG/DL (ref 1.6–2.6)
MCH RBC QN AUTO: 31.7 PG (ref 25.2–33.5)
MCHC RBC AUTO-ENTMCNC: 31.6 G/DL (ref 28.4–34.8)
MCV RBC AUTO: 100.3 FL (ref 82.6–102.9)
MONOCYTES # BLD: 6 % (ref 3–12)
NRBC AUTOMATED: 0 PER 100 WBC
PDW BLD-RTO: 14.9 % (ref 11.8–14.4)
PHOSPHORUS: 2.5 MG/DL (ref 2.6–4.5)
PLATELET # BLD: 163 K/UL (ref 138–453)
PMV BLD AUTO: 11.9 FL (ref 8.1–13.5)
POTASSIUM SERPL-SCNC: 4.4 MMOL/L (ref 3.7–5.3)
RBC # BLD: 3.66 M/UL (ref 3.95–5.11)
RBC # BLD: ABNORMAL 10*6/UL
SEG NEUTROPHILS: 81 % (ref 36–65)
SEGMENTED NEUTROPHILS ABSOLUTE COUNT: 5.66 K/UL (ref 1.5–8.1)
SODIUM BLD-SCNC: 134 MMOL/L (ref 135–144)
WBC # BLD: 7 K/UL (ref 3.5–11.3)

## 2022-04-01 ENCOUNTER — HOSPITAL ENCOUNTER (OUTPATIENT)
Dept: OCCUPATIONAL THERAPY | Age: 62
Setting detail: THERAPIES SERIES
Discharge: HOME | End: 2022-04-01
Payer: COMMERCIAL

## 2022-04-01 ENCOUNTER — APPOINTMENT (OUTPATIENT)
Dept: PHYSICAL THERAPY | Age: 62
End: 2022-04-01
Payer: COMMERCIAL

## 2022-04-01 ENCOUNTER — HOSPITAL ENCOUNTER (OUTPATIENT)
Dept: PHYSICAL THERAPY | Age: 62
Setting detail: THERAPIES SERIES
Discharge: HOME | End: 2022-04-01
Payer: COMMERCIAL

## 2022-04-01 NOTE — PROGRESS NOTES
[address] [place]   OCCUPATIONAL THERAPY MISSED TREATMENT NOTE   OUTPATIENT   Date: 22  Patient Name: Maria Elena Basurto       MRN: [id number]   Account #: [de-identified]    : 1960  (57 y.o.)  Gender: female   Referring Practitioner: Mai Cueto APRN- CNP  Diagnosis: long term memory loss (R41.3), , muscle rt arm weakness (M62.81)  OT Visit Information  OT Insurance Information: Reena Ped chris  (after the 20th OT visit - need insurance approval before further appt)  Total # of Visits Approved: 20  Canceled Appointment: 1  REASON FOR MISSED TREATMENT:  Pt called and cancel because she is having oxygen issues. Per  pt states she will be at therapy on Monday.        Pat Lundy, OT

## 2022-04-04 ENCOUNTER — APPOINTMENT (OUTPATIENT)
Dept: PHYSICAL THERAPY | Age: 62
End: 2022-04-04
Payer: COMMERCIAL

## 2022-04-04 ENCOUNTER — CARE COORDINATION (OUTPATIENT)
Dept: CARE COORDINATION | Age: 62
End: 2022-04-04

## 2022-04-04 ENCOUNTER — HOSPITAL ENCOUNTER (OUTPATIENT)
Dept: PHYSICAL THERAPY | Age: 62
Setting detail: THERAPIES SERIES
Discharge: HOME | End: 2022-04-04
Payer: COMMERCIAL

## 2022-04-04 ENCOUNTER — HOSPITAL ENCOUNTER (OUTPATIENT)
Dept: OCCUPATIONAL THERAPY | Age: 62
Setting detail: THERAPIES SERIES
Discharge: HOME | End: 2022-04-04
Payer: COMMERCIAL

## 2022-04-04 DIAGNOSIS — R06.02 SHORTNESS OF BREATH ON EXERTION: Primary | ICD-10-CM

## 2022-04-04 DIAGNOSIS — E11.21 TYPE 2 DIABETES MELLITUS WITH DIABETIC NEPHROPATHY, WITH LONG-TERM CURRENT USE OF INSULIN (HCC): ICD-10-CM

## 2022-04-04 DIAGNOSIS — Z79.4 TYPE 2 DIABETES MELLITUS WITH DIABETIC NEPHROPATHY, WITH LONG-TERM CURRENT USE OF INSULIN (HCC): ICD-10-CM

## 2022-04-04 DIAGNOSIS — R53.1 WEAKNESS: ICD-10-CM

## 2022-04-04 DIAGNOSIS — I50.32 CHRONIC DIASTOLIC HEART FAILURE (HCC): ICD-10-CM

## 2022-04-04 PROCEDURE — 97110 THERAPEUTIC EXERCISES: CPT

## 2022-04-04 NOTE — FLOWSHEET NOTE
[facility]   [address]   Phone: [phone]   Fax: [phone]    Physical Therapy Daily Treatment Note      Date:  2022  Patient Name:  Robert Frost    :  1960  MRN: [id number]  Physician: AFSANEH Graham - FRANKY                     Insurance: Bridget Carpenterbeth  visits -- Nile Jonasde after 20 visits   Medical Diagnosis:   M62.81 (ICD-10-CM) - Muscle right arm weakness   I63.9 (ICD-10-CM) - Ischemic stroke of frontal lobe (HCC)   M54.41, M54.42, G89.29 (ICD-10-CM) - Chronic midline low back pain with bilateral sciatica   Rehab Codes: Z74.0 reduced mobility, R53 weakness, R 53. Deconditioning, R29.6 fall risk   Date of symptom onset: initial CVA 21  Next 's appt. : 3/3/22 with neurology      PRECAUTIONS: allergic to adhesive tapes; not allowed to get chest port wet; no lifting or BP to be done with L arm; Dialysis; CHF   Visit# / total visits: 5/15 Cancels/No Shows: 3/0    Vitals: *on 3L supplemental 02  Before session: 98% 89BPM  Vitals recorded in comments throughout session      Subjective:  Pt reports feeling very weak in the legs and arms today. Pt states it is difficult to breathe. Pt is feeling worse this visit since being sick. Pt moved up her appointment with the pulmonologist. Pt reports going to dialysis tomorrow.      Pain:  [] Yes  [x] No Location: Pain Rating: (0-10 scale) uncomfortable/10  Pain altered Tx:  [x] No  [] Yes  Action:  Comments:     Objective:  Modalities:   Precautions:  Exercises:  Exercise Reps/ Time Weight/ Level Completed  Today Comments   Seated        LAQ 20x ea 2# X Post exercise 98%, 86 bpm   Marching 20x2, altn Red  X Pt performed with taking break only between sets   Hip ABD 20x 5\" hold red X 97%, 87 bpm    seated HS curls  2x10 ea   x    Resisted PF/DF 2x15 red X    Resisted whole leg extensions 2x10  red  After set 1 94%, 84 bpm    Sit<>stand  2x4   BUE support needed; rest needed between each rep. After 4 reps becomes SOB, 83% SpO2, 101bpm; allowed for rest time with pursed lip breathing    education 10'   Proper sitting posture to increase lung capacity and to perform proper breathing technique to increase O2 intake. Pacing throughout activities to prevent from fatiguing easily. Standing marches  2x10 altn  x Light UE support; stats 90%, HR 93   Standing hip abd x10 ea   Light UE support;    Standing hip extension x10ea   Light UE support    Standing SLR x10 ea   Light UE support    nustep  6' Level 2  x Remains at 91% spO2, HR 93 when checked at 3 minutes and post                  Other:   3/16: 10mWT: 23 seconds with rollator and 3L of O2  3/16: Exercises- Access Code: [email or web address]  Seated Long Arc Quad - 1 x daily - 7 x weekly - 10 reps  Seated March - 1 x daily - 7 x weekly - 10 reps  Seated Hip Abduction with Resistance - 1 x daily - 7 x weekly - 10 reps - 3-5 hold  Seated Ankle Plantar Flexion with Resistance Loop - 1 x daily - 7 x weekly - 10 reps  Sit to Stand - 1 x daily - 7 x weekly - 10 reps      Specific Instructions for next treatment: progress to standing activities as able, continued to monitor vitals, follow up on pulmonary function testing       Assessment: [x] Progressing toward goals. Pt continues to require frequent rest breaks after exercises. Pt took minimal breaks during exercises. Stats remained in the 90s with all exercises today. Pt stats dropped after standing and performing standing marches. Pt reports no pain today with any exercises. Held most standing hip exercises today due to decreased tolerance to standing and decreasing stats with standing. Ended with nustep for endurance and pt took intermittent breaks. [] No change. [] Other:    [x] Patient would continue to benefit from skilled physical therapy services in order to: address strength, balance, gait, and endurance to help her gain more capacity and independence with her mobility & ADLs.      STG: (to be met in 8 treatments)  1. Pt will be able to complete sit to stand from a standard chair without UE support to increase independence to transfer from any surface. 2. Pt will be able to to complete 6 minutes of continuous ambulation with LRAD at mod IND level to progress endurance needed for community ambulation   3. Pt will demonstrate >10 SLS stability with light UE support to safely allow pt to enter/exit her tub shower. 4.  Pt will improve score on BROWN balance scale by >/=6 points to reach minimal detectable change for CVA population to improve overall balance stability and decrease fall risk with mobility. 5.  Pt will improve time on 5xSTS to <20 seconds without use of UEs to decrease fall risk and increase functional capacity for mobility. 6. Pt will increase gait speed to >/=  0.8 m/s with LRAD at mod I level to improve ability to navigate as a household/community ambulator. LTG: (to be met in 15 treatments)  1. Pt will improve score on BROWN balance scale by >/= 45/56 points  to improve overall balance stability and decrease fall risk with mobility. 2.  Pt will improve time on 5xSTS to <15 seconds without use of UEs to decrease fall risk and increase functional capacity for mobility. 3. Pt will be independent with home program addressing strength, flexibility and balance to maximize gains made in therapy to improve overall functional capacity for mobility.   4. Pt will report no falls for x6 weeks demonstrating improved safety with mobility and implementation of fall prevention strategies.   5. Pt will increase score on SIS-16 to >/= 60/80 to demonstrate improved functional levels with ADLs.    6. Pt will increase gait speed to >/=  1.0 m/s with LRAD at mod I level to improve ability to navigate as a community ambulator. 7. Pt will be able to ambulate 20 ft without AD at IND level without LOB to improve mobility to no AD for in the home.        Pt. Education:  [x] Yes  [] No  [x] Reviewed Prior HEP/Ed  Method of Education: [x] Verbal  [] Demo  [x] Written  Comprehension of Education:  [x] Verbalizes understanding. [] Demonstrates understanding. [] Needs review. [] Demonstrates/verbalizes HEP/Ed previously given. Plan: [x] Continue per plan of care.    [] Other:       Treatment Charges: Mins Units   []  Modalities     [x]  Ther Exercise 41 3   []  Manual Therapy     []  Ther Activities     []  Aquatics     []  Neuromuscular     [] Vasocompression     [] Gait Training     [] Dry needling        [] 1 or 2 muscles        [] 3 or more muscles     []  Other     Total Treatment time 41 3     Time In: 1719      Time Out: 2265     Electronically signed by:  SUKUMAR Fishman  This documentation has been reviewed and entirety of treatment session with direct supervision by clinical instructor, Calli Fraire PT, DPT

## 2022-04-04 NOTE — CARE COORDINATION
Patient called asking for advice about her increased weakness and issues with her breathing. Pulse ox ranging in low 90's per patient. Difficulty climbing 2 stairs, winded when speaking. Patient mentioned PT does not seem to be helping her regain strength. ACM recommended scheduling appointment with PCP. Patient will call for appointment. ACM will call respiratory to hopefully move up appointment. Patient scheduled appointment with respiratory specialist but they could not see her until June. ACM spoke with office, appointment changed to 4.29 @ 10 am @ [address]. Patient notified of appointment change. Per patient she is waiting to hear back from PCP office regarding appointment.

## 2022-04-04 NOTE — PROGRESS NOTES
[address]  Rehabilitation Services  Occupational Therapy Treatment Note  Date: 22  Patient Name: Maria Elena Basurto    MRN: [id number]  Account: [de-identified]   : 1960  (57 y.o.) Gender: female     General  Additional Pertinent Hx: Fistula lt forearm 2021. Stroke Aug 1, 2021  Referring Practitioner: Mai Cueto APRN- CNP  Diagnosis: long term memory loss (R41.3), , muscle rt arm weakness (M62.81)  OT Visit Information  OT Insurance Information: [employer]  (after the 20th OT visit - need insurance approval before further appt)  Total # of Visits Approved: 20  Total # of Visits to Date: 3  Subjective  Subjective: \"I feel like crap, it's hard to breathe. It's hard to recover, like when I go to the laundry room or something\"  Comments: Pt reports worsening SOB over the last few days although pulse ox numbers have been [facility]. Pt reports that she has a pulmonology appointment later this month but is hoping to get it moved up sooner. Pain Assessment  Patient Currently in Pain: Denies (Pt reports arms/legs feel weak/heavy but denies pain)    Wrist/Hand Exercises  Wrist/Hand Therapy?: Yes  Pre Wrist Flexion Reps/Sets/Weight: wrist over ramp : bilateral  palm up 1# 120x each  Pre Wrist Ext Reps/Sets/Weight: wrist over ramp: bilateral palm down 1# 20x  each  Pre Radial Deviation Reps/Sets/Weight: wrist over ramp: bilateral 1# RD/UD 20x each  Other exercises  Other exercises?: Yes  Other exercises 1: PROM rt UE and hand. rt hand finger blocking exercises 10x each (PIPs, DIPs), edema massage rt hand. (No edema noted in hand today.)  Other exercises 5: key pegs: using rt hand place 25 pieces in board, then remove pieces & hold in hand.   Other exercises 6: graded clothespins - using rt hand place/remove 6 yellow, 6 red, and 4 green ( light to medium resistance)  Assessment  Performance deficits / Impairments: Decreased fine motor control,Decreased high-level IADLs,Decreased strength,Decreased endurance  Assessment: OT treatment focused on increasing strength and coordination of RUE for increased IND/ease with ADL/IADL tasks - see OT exercise sheet for detailed report. Pt required frequent rest breaks this date 2* increased SOB/fatigue. SpO2 monitored throughout session and maintained WFL. Despite c/o pt tolerated increased reps with wrist over ramp exercises and advanced to green clothespins x4.   Treatment Diagnosis: Rt UE weakness,impaired coordination,impaired sensation hands, decrease endurance. (M62.81,R27.9,R20.2)  Prognosis: Good  REQUIRES OT FOLLOW UP: Yes  Discharge Recommendations: Outpatient OT    Plan  REQUIRES OT FOLLOW UP: Yes     OT Individual Minutes  Time In: 8344  Time Out: 1733  Minutes: 44  Time Code Minutes   Timed Code Treatment Minutes: 44 Minutes    Electronically signed by [facility] on 4/4/22 at 3:43 PM EDT    Treatment Charges:  Minutes Units Time In-Out   Ultrasound      Electrical Stim      Iontophoresis      Paraffin       Massage      Eval      ADL       Ther Exercise 44 3 [id number]   Ther Activities      Neuro Re-Ed      Splinting       Other      Total Treatment Time:  99 3

## 2022-04-05 ENCOUNTER — TELEPHONE (OUTPATIENT)
Dept: VASCULAR SURGERY | Age: 62
End: 2022-04-05

## 2022-04-05 ENCOUNTER — HOSPITAL ENCOUNTER (OUTPATIENT)
Dept: GENERAL RADIOLOGY | Facility: CLINIC | Age: 62
Discharge: HOME | End: 2022-04-07
Payer: COMMERCIAL

## 2022-04-05 ENCOUNTER — HOSPITAL ENCOUNTER (OUTPATIENT)
Facility: CLINIC | Age: 62
Discharge: HOME | End: 2022-04-07
Payer: COMMERCIAL

## 2022-04-05 ENCOUNTER — HOSPITAL ENCOUNTER (EMERGENCY)
Age: 62
Discharge: HOME | End: 2022-04-06
Attending: EMERGENCY MEDICINE
Payer: COMMERCIAL

## 2022-04-05 ENCOUNTER — APPOINTMENT (OUTPATIENT)
Dept: NUCLEAR MEDICINE | Age: 62
End: 2022-04-05
Payer: COMMERCIAL

## 2022-04-05 ENCOUNTER — HOSPITAL ENCOUNTER (OUTPATIENT)
Age: 62
Setting detail: SPECIMEN
Discharge: HOME | End: 2022-04-05

## 2022-04-05 DIAGNOSIS — R06.02 SHORTNESS OF BREATH ON EXERTION: ICD-10-CM

## 2022-04-05 DIAGNOSIS — E11.21 TYPE 2 DIABETES MELLITUS WITH DIABETIC NEPHROPATHY, WITH LONG-TERM CURRENT USE OF INSULIN (HCC): ICD-10-CM

## 2022-04-05 DIAGNOSIS — Z79.4 TYPE 2 DIABETES MELLITUS WITH DIABETIC NEPHROPATHY, WITH LONG-TERM CURRENT USE OF INSULIN (HCC): ICD-10-CM

## 2022-04-05 DIAGNOSIS — R73.9 HYPERGLYCEMIA: Primary | ICD-10-CM

## 2022-04-05 DIAGNOSIS — Z71.1 FEARED COMPLAINT WITHOUT DIAGNOSIS: ICD-10-CM

## 2022-04-05 DIAGNOSIS — R53.1 WEAKNESS: ICD-10-CM

## 2022-04-05 LAB
ABSOLUTE EOS #: 0.2 K/UL (ref 0–0.4)
ABSOLUTE LYMPH #: 0.9 K/UL (ref 1–4.8)
ABSOLUTE MONO #: 0.4 K/UL (ref 0.1–1.3)
ANION GAP SERPL CALCULATED.3IONS-SCNC: 15 MMOL/L (ref 9–17)
ANION GAP SERPL CALCULATED.3IONS-SCNC: 17 MMOL/L (ref 9–17)
BASOPHILS # BLD: 1 % (ref 0–2)
BASOPHILS ABSOLUTE: 0.1 K/UL (ref 0–0.2)
BUN BLDV-MCNC: 13 MG/DL (ref 8–23)
BUN BLDV-MCNC: 14 MG/DL (ref 8–23)
CALCIUM SERPL-MCNC: 8.3 MG/DL (ref 8.6–10.4)
CALCIUM SERPL-MCNC: 8.6 MG/DL (ref 8.6–10.4)
CHLORIDE BLD-SCNC: 98 MMOL/L (ref 98–107)
CHLORIDE BLD-SCNC: 99 MMOL/L (ref 98–107)
CO2: 18 MMOL/L (ref 20–31)
CO2: 22 MMOL/L (ref 20–31)
CREAT SERPL-MCNC: 1.46 MG/DL (ref 0.5–0.9)
CREAT SERPL-MCNC: 1.73 MG/DL (ref 0.5–0.9)
D-DIMER QUANTITATIVE: 7.92 MG/L FEU
EOSINOPHILS RELATIVE PERCENT: 3 % (ref 0–4)
GFR AFRICAN AMERICAN: 36 ML/MIN
GFR AFRICAN AMERICAN: 44 ML/MIN
GFR NON-AFRICAN AMERICAN: 30 ML/MIN
GFR NON-AFRICAN AMERICAN: 36 ML/MIN
GFR SERPL CREATININE-BSD FRML MDRD: ABNORMAL ML/MIN/{1.73_M2}
GFR SERPL CREATININE-BSD FRML MDRD: ABNORMAL ML/MIN/{1.73_M2}
GLUCOSE BLD-MCNC: 464 MG/DL (ref 70–99)
GLUCOSE BLD-MCNC: 587 MG/DL (ref 70–99)
HCT VFR BLD CALC: 34.5 % (ref 36–46)
HCT VFR BLD CALC: 35.8 % (ref 36.3–47.1)
HEMOGLOBIN: 11.4 G/DL (ref 12–16)
HEMOGLOBIN: 11.9 G/DL (ref 11.9–15.1)
IRON: 65 UG/DL (ref 37–145)
LYMPHOCYTES # BLD: 15 % (ref 24–44)
MCH RBC QN AUTO: 31 PG (ref 26–34)
MCH RBC QN AUTO: 31.8 PG (ref 25.2–33.5)
MCHC RBC AUTO-ENTMCNC: 33 G/DL (ref 31–37)
MCHC RBC AUTO-ENTMCNC: 33.2 G/DL (ref 28.4–34.8)
MCV RBC AUTO: 94 FL (ref 80–100)
MCV RBC AUTO: 95.7 FL (ref 82.6–102.9)
MONOCYTES # BLD: 7 % (ref 1–7)
NRBC AUTOMATED: 0 PER 100 WBC
PDW BLD-RTO: 14.4 % (ref 11.8–14.4)
PDW BLD-RTO: 15.2 % (ref 11.5–14.9)
PLATELET # BLD: 145 K/UL (ref 150–450)
PLATELET # BLD: 149 K/UL (ref 138–453)
PMV BLD AUTO: 12 FL (ref 8.1–13.5)
PMV BLD AUTO: 9.1 FL (ref 6–12)
POTASSIUM SERPL-SCNC: 3.6 MMOL/L (ref 3.7–5.3)
POTASSIUM SERPL-SCNC: 4 MMOL/L (ref 3.7–5.3)
RBC # BLD: 3.67 M/UL (ref 4–5.2)
RBC # BLD: 3.74 M/UL (ref 3.95–5.11)
SEG NEUTROPHILS: 74 % (ref 36–66)
SEGMENTED NEUTROPHILS ABSOLUTE COUNT: 4.3 K/UL (ref 1.3–9.1)
SODIUM BLD-SCNC: 133 MMOL/L (ref 135–144)
SODIUM BLD-SCNC: 136 MMOL/L (ref 135–144)
TROPONIN, HIGH SENSITIVITY: 74 NG/L (ref 0–14)
TROPONIN, HIGH SENSITIVITY: 76 NG/L (ref 0–14)
WBC # BLD: 5.8 K/UL (ref 3.5–11)
WBC # BLD: 8.1 K/UL (ref 3.5–11.3)

## 2022-04-05 PROCEDURE — 85025 COMPLETE CBC W/AUTO DIFF WBC: CPT

## 2022-04-05 PROCEDURE — 36415 COLL VENOUS BLD VENIPUNCTURE: CPT

## 2022-04-05 PROCEDURE — 78582 LUNG VENTILAT&PERFUS IMAGING: CPT

## 2022-04-05 PROCEDURE — 93005 ELECTROCARDIOGRAM TRACING: CPT | Performed by: EMERGENCY MEDICINE

## 2022-04-05 PROCEDURE — 80048 BASIC METABOLIC PNL TOTAL CA: CPT

## 2022-04-05 PROCEDURE — 84484 ASSAY OF TROPONIN QUANT: CPT

## 2022-04-05 PROCEDURE — 71046 X-RAY EXAM CHEST 2 VIEWS: CPT

## 2022-04-05 PROCEDURE — 99283 EMERGENCY DEPT VISIT LOW MDM: CPT

## 2022-04-05 RX ORDER — SODIUM CHLORIDE 0.9 % (FLUSH) 0.9 %
10 SYRINGE (ML) INJECTION PRN
Status: DISCONTINUED | OUTPATIENT
Start: 2022-04-05 | End: 2022-04-06 | Stop reason: HOSPADM

## 2022-04-05 RX ORDER — KIT FOR THE PREPARATION OF TECHNETIUM TC 99M PENTETATE 20 MG/1
44 INJECTION, POWDER, LYOPHILIZED, FOR SOLUTION INTRAVENOUS; RESPIRATORY (INHALATION)
Status: COMPLETED | OUTPATIENT
Start: 2022-04-05 | End: 2022-04-06

## 2022-04-05 ASSESSMENT — ENCOUNTER SYMPTOMS
COLOR CHANGE: 0
TROUBLE SWALLOWING: 0
SINUS PRESSURE: 0
EYE PAIN: 0
DIARRHEA: 0
EYE REDNESS: 0
RHINORRHEA: 0
CONSTIPATION: 0
CHEST TIGHTNESS: 0
NAUSEA: 0
BLOOD IN STOOL: 0
ABDOMINAL PAIN: 0
SHORTNESS OF BREATH: 0
COUGH: 0
BACK PAIN: 0
EYE DISCHARGE: 0
FACIAL SWELLING: 0
SORE THROAT: 0
WHEEZING: 0
VOMITING: 0

## 2022-04-05 ASSESSMENT — PAIN SCALES - GENERAL: PAINLEVEL_OUTOF10: 7

## 2022-04-05 ASSESSMENT — PAIN - FUNCTIONAL ASSESSMENT: PAIN_FUNCTIONAL_ASSESSMENT: 0-10

## 2022-04-05 NOTE — TELEPHONE ENCOUNTER
Pt was informed, pt is currently in dialysis and states she will try to go after. If not able to she will go tomorrow.   Pt was informed to go to ER if symptoms worsen

## 2022-04-05 NOTE — TELEPHONE ENCOUNTER
RN [employer] calls from [facility] in regards to patient. She states that patient infiltrated in her past. And patient has not been cannulated since. Now patients CVC is not running at all. RN [employer] states she was going to try to cannulate the access but she did not feel like it be successful d/t access still not being mature. Nephrology wants patient to come in for a CVC exchange as soon as possible. Would you like me to try to put her on after your Carotid tomorrow?

## 2022-04-05 NOTE — TELEPHONE ENCOUNTER
Please have her go for a stat chest x-ray labs in our building. We will call her with those results. If she can go today that be great. If she has to go later today, she can go to the hospital as well. Orders are all on her chart. ER if symptoms worsen.

## 2022-04-06 ENCOUNTER — HOSPITAL ENCOUNTER (OUTPATIENT)
Dept: CARDIAC CATH/INVASIVE PROCEDURES | Age: 62
Discharge: HOME | End: 2022-04-06
Payer: COMMERCIAL

## 2022-04-06 ENCOUNTER — CARE COORDINATION (OUTPATIENT)
Dept: CARE COORDINATION | Age: 62
End: 2022-04-06

## 2022-04-06 VITALS
RESPIRATION RATE: 20 BRPM | DIASTOLIC BLOOD PRESSURE: 66 MMHG | SYSTOLIC BLOOD PRESSURE: 177 MMHG | TEMPERATURE: 98.4 F | WEIGHT: 250 LBS | BODY MASS INDEX: 41.6 KG/M2 | HEART RATE: 78 BPM | OXYGEN SATURATION: 96 %

## 2022-04-06 VITALS
DIASTOLIC BLOOD PRESSURE: 70 MMHG | OXYGEN SATURATION: 99 % | RESPIRATION RATE: 14 BRPM | BODY MASS INDEX: 41.27 KG/M2 | SYSTOLIC BLOOD PRESSURE: 139 MMHG | HEART RATE: 75 BPM | WEIGHT: 248 LBS

## 2022-04-06 LAB
ACTION: NORMAL
DATE AND TIME: NORMAL
EKG ATRIAL RATE: 73 BPM
EKG P AXIS: 64 DEGREES
EKG P-R INTERVAL: 144 MS
EKG Q-T INTERVAL: 406 MS
EKG QRS DURATION: 94 MS
EKG QTC CALCULATION (BAZETT): 447 MS
EKG R AXIS: 65 DEGREES
EKG T AXIS: -16 DEGREES
EKG VENTRICULAR RATE: 73 BPM
ESTIMATED AVERAGE GLUCOSE: 278 MG/DL
GFR NON-AFRICAN AMERICAN: 23 ML/MIN
GFR SERPL CREATININE-BSD FRML MDRD: 28 ML/MIN
GFR SERPL CREATININE-BSD FRML MDRD: ABNORMAL ML/MIN/{1.73_M2}
GLUCOSE BLD-MCNC: 283 MG/DL (ref 65–105)
GLUCOSE BLD-MCNC: 390 MG/DL (ref 65–105)
GLUCOSE BLD-MCNC: 429 MG/DL (ref 74–100)
GLUCOSE BLD-MCNC: 485 MG/DL (ref 65–105)
HBA1C MFR BLD: 11.3 % (ref 4–6)
NOTIFY: NORMAL
POC BUN: 16 MG/DL (ref 8–26)
POC CHLORIDE: 106 MMOL/L (ref 98–107)
POC CREATININE: 2.16 MG/DL (ref 0.51–1.19)
POC HEMATOCRIT: 34 % (ref 36–46)
POC HEMOGLOBIN: 11.7 G/DL (ref 12–16)
POC POTASSIUM: 3.6 MMOL/L (ref 3.5–4.5)
POC SODIUM: 140 MMOL/L (ref 138–146)
READ BACK: YES

## 2022-04-06 PROCEDURE — 3430000000 HC RX DIAGNOSTIC RADIOPHARMACEUTICAL: Performed by: EMERGENCY MEDICINE

## 2022-04-06 PROCEDURE — 7100000001 HC PACU RECOVERY - ADDTL 15 MIN

## 2022-04-06 PROCEDURE — 6360000002 HC RX W HCPCS

## 2022-04-06 PROCEDURE — 84132 ASSAY OF SERUM POTASSIUM: CPT

## 2022-04-06 PROCEDURE — 84295 ASSAY OF SERUM SODIUM: CPT

## 2022-04-06 PROCEDURE — 2580000003 HC RX 258: Performed by: EMERGENCY MEDICINE

## 2022-04-06 PROCEDURE — 82947 ASSAY GLUCOSE BLOOD QUANT: CPT

## 2022-04-06 PROCEDURE — 6360000004 HC RX CONTRAST MEDICATION

## 2022-04-06 PROCEDURE — 75827 VEIN X-RAY CHEST: CPT

## 2022-04-06 PROCEDURE — C1750 CATH, HEMODIALYSIS,LONG-TERM: HCPCS

## 2022-04-06 PROCEDURE — C1725 CATH, TRANSLUMIN NON-LASER: HCPCS

## 2022-04-06 PROCEDURE — 36581 REPLACE TUNNELED CV CATH: CPT | Performed by: SURGERY

## 2022-04-06 PROCEDURE — A9539 TC99M PENTETATE: HCPCS | Performed by: EMERGENCY MEDICINE

## 2022-04-06 PROCEDURE — 37248 TRLUML BALO ANGIOP 1ST VEIN: CPT

## 2022-04-06 PROCEDURE — 36581 REPLACE TUNNELED CV CATH: CPT

## 2022-04-06 PROCEDURE — 82435 ASSAY OF BLOOD CHLORIDE: CPT

## 2022-04-06 PROCEDURE — A9540 TC99M MAA: HCPCS | Performed by: EMERGENCY MEDICINE

## 2022-04-06 PROCEDURE — 85014 HEMATOCRIT: CPT

## 2022-04-06 PROCEDURE — 6370000000 HC RX 637 (ALT 250 FOR IP): Performed by: SURGERY

## 2022-04-06 PROCEDURE — 75825 VEIN X-RAY TRUNK: CPT

## 2022-04-06 PROCEDURE — C1769 GUIDE WIRE: HCPCS

## 2022-04-06 PROCEDURE — 6370000000 HC RX 637 (ALT 250 FOR IP): Performed by: EMERGENCY MEDICINE

## 2022-04-06 PROCEDURE — 7100000000 HC PACU RECOVERY - FIRST 15 MIN

## 2022-04-06 PROCEDURE — 84520 ASSAY OF UREA NITROGEN: CPT

## 2022-04-06 PROCEDURE — 2709999900 HC NON-CHARGEABLE SUPPLY

## 2022-04-06 PROCEDURE — 82565 ASSAY OF CREATININE: CPT

## 2022-04-06 PROCEDURE — 82948 REAGENT STRIP/BLOOD GLUCOSE: CPT

## 2022-04-06 PROCEDURE — 93010 ELECTROCARDIOGRAM REPORT: CPT | Performed by: INTERNAL MEDICINE

## 2022-04-06 PROCEDURE — 36010 PLACE CATHETER IN VEIN: CPT

## 2022-04-06 RX ORDER — SODIUM CHLORIDE 9 MG/ML
INJECTION, SOLUTION INTRAVENOUS CONTINUOUS
Status: DISCONTINUED | OUTPATIENT
Start: 2022-04-06 | End: 2022-04-07 | Stop reason: HOSPADM

## 2022-04-06 RX ADMIN — INSULIN LISPRO 8 UNITS: 100 INJECTION, SOLUTION INTRAVENOUS; SUBCUTANEOUS at 15:39

## 2022-04-06 RX ADMIN — Medication 5 MILLICURIE: at 00:35

## 2022-04-06 RX ADMIN — INSULIN LISPRO 14 UNITS: 100 INJECTION, SOLUTION INTRAVENOUS; SUBCUTANEOUS at 00:56

## 2022-04-06 RX ADMIN — INSULIN LISPRO 12 UNITS: 100 INJECTION, SOLUTION INTRAVENOUS; SUBCUTANEOUS at 13:44

## 2022-04-06 RX ADMIN — SODIUM CHLORIDE, PRESERVATIVE FREE 10 ML: 5 INJECTION INTRAVENOUS at 00:35

## 2022-04-06 RX ADMIN — SODIUM CHLORIDE: 9 INJECTION, SOLUTION INTRAVENOUS at 13:29

## 2022-04-06 RX ADMIN — KIT FOR THE PREPARATION OF TECHNETIUM TC 99M PENTETATE 25 MILLICURIE: 20 INJECTION, POWDER, LYOPHILIZED, FOR SOLUTION INTRAVENOUS; RESPIRATORY (INHALATION) at 00:11

## 2022-04-06 ASSESSMENT — ENCOUNTER SYMPTOMS
ABDOMINAL PAIN: 0
CHEST TIGHTNESS: 0
COUGH: 0
EYE PAIN: 0
VOMITING: 0
TROUBLE SWALLOWING: 0
COLOR CHANGE: 0
SHORTNESS OF BREATH: 0
ABDOMINAL DISTENTION: 0
VOICE CHANGE: 0

## 2022-04-06 NOTE — TELEPHONE ENCOUNTER
Informed patient of procedure today at 2pm with an arrival time of 1pm. She verbalized understanding.

## 2022-04-06 NOTE — PRE SEDATION
Sedation Pre-Procedure Note    Patient Name: Ag Maria   YOB: 1960  Room/Bed: Room/bed info not found  Medical Record Number: [id number]  Date: 4/6/2022   Time: 3:38 PM       Indication: Need for dialysis access and end-stage renal disease. Consent: I have discussed with the patient and/or the patient representative the indication, alternatives, and the possible risks and/or complications of the planned procedure and the anesthesia methods. The patient and/or patient representative appear to understand and agree to proceed. Vital Signs:   Vitals:    04/06/22 1319   BP: (!) 118/52   Pulse: 79   Resp: 22   SpO2: 94%       Past Medical History:   has a past medical history of Backache, unspecified, CHF (congestive heart failure) ([facility] [address] Chronic kidney disease, Coronary atherosclerosis of artery bypass graft, COVID, Cramp of limb, Gallstones, Hypertension, Insomnia, Pneumonia, Type II or unspecified type diabetes mellitus with renal manifestations, not stated as uncontrolled(250.40), Type II or unspecified type diabetes mellitus without mention of complication, not stated as uncontrolled, and Unspecified vitamin D deficiency. Past Surgical History:   has a past surgical history that includes Coronary artery bypass graft; Knee arthroscopy; Carpal tunnel release; Breast surgery; Tonsillectomy; Hand surgery; Ankle fracture surgery; Cholecystectomy, open (N/A); IR TUNNELED CVC PLACE WO SQ PORT/PUMP > 5 YEARS (8/18/2021); AV fistula creation (12/14/2021); Dialysis fistula creation (Left, 12/14/2021); and other surgical history (04/06/2022). Medications:   Scheduled Meds:    insulin lispro  8 Units SubCUTAneous Once     Continuous Infusions:    sodium chloride 75 mL/hr at 04/06/22 1329     PRN Meds:   Home Meds:   Prior to Admission medications    Medication Sig Start Date End Date Taking?  Authorizing Provider   acetaminophen (TYLENOL) 325 MG tablet Take 650 mg by mouth every 6 hours as needed for Pain    Historical Provider, MD   traZODone (DESYREL) 50 MG tablet TAKE 1 & 1/2 (ONE & ONE-HALF) TABLETS BY MOUTH EVERY NIGHT 3/8/22   AFSANEH Schmidt CNP   furosemide (LASIX) 80 MG tablet Take 1 tablet by mouth 2 times daily 3/8/22   AFSANEH Schmidt CNP   sodium bicarbonate 650 MG tablet Take 1 tablet by mouth 3 times daily 3/2/22   Shauna Ramirez MD   Insulin Pen Needle (EASY TOUCH PEN NEEDLES) 29G X 12MM MISC 1 each by Does not apply route daily 3/1/22   AFSANEH Schmidt CNP   ReliOn Lancets Micro-Thin 33G MISC USE AS DIRECTED EVERY DAY 1/28/22   Historical Provider, MD   blood glucose test strips (TRUE METRIX BLOOD GLUCOSE TEST) strip 1 each by In Vitro route 3 times daily Uncontrolled diabetes 2/2/22 3/11/22  AFSANEH Schmidt CNP   HUMALOG 100 UNIT/ML injection vial  11/29/21   Historical Provider, MD   insulin glargine (BASAGLAR KWIKPEN) 100 UNIT/ML injection pen Inject 73 Units into the skin 2 times daily 11/30/21   AFSANEH Schmidt CNP   Blood Glucose Monitoring Suppl (TRUE METRIX GO GLUCOSE METER) w/Device KIT 1 each by Does not apply route 4 times daily 11/30/21   AFSANEH Schmidt CNP   Lancet Device MISC 1 Device by Does not apply route once for 1 dose 11/30/21 11/30/21  AFSANEH Schimdt CNP   Lancets MISC 1 each by Does not apply route daily 11/30/21   AFSANEH Schmidt CNP   gabapentin (NEURONTIN) 300 MG capsule Take 1 capsule by mouth 2 times daily for 30 days.  11/22/21 3/11/22  Natalya Mathis MD   febuxostat (ULORIC) 40 MG TABS tablet Take 1 tablet by mouth daily 11/22/21   Natalya Mathis MD   docusate sodium (COLACE) 100 MG capsule Take 1 capsule by mouth 2 times daily 11/22/21   Natalya Mathis MD   tamsulosin [facility]) 0.4 MG capsule Take 1 capsule by mouth daily 11/23/21   Natalya Mathis MD   atorvastatin (LIPITOR) 80 MG tablet Take 1 tablet by mouth daily 11/3/21   AFSANEH Schmidt - CNP   aspirin 81 MG chewable tablet Take 1 tablet by mouth daily 9/25/21   Prakash Prieto MD   ranolazine (RANEXA) 1000 MG extended release tablet Take 1 tablet by mouth 2 times daily 9/25/21   Prakash Prieto MD   busPIRone (BUSPAR) 7.5 MG tablet Take 1 tablet by mouth 3 times daily 9/25/21   Prakash Prieto MD   carvedilol (COREG) 6.25 MG tablet Take 1 tablet by mouth 2 times daily 9/25/21   Prakash Prieto MD   pantoprazole (PROTONIX) 40 MG tablet Take 1 tablet by mouth every morning (before breakfast) 9/25/21   Prakash Prieto MD   allopurinol (ZYLOPRIM) 100 MG tablet Take 1 tablet by mouth daily 4/14/21   AFSANEH Morgan - CNP     Coumadin Use Last 7 Days:  no  Antiplatelet drug therapy use last 7 days: yes -aspirin  Other anticoagulant use last 7 days: no  Additional Medication Information:        Pre-Sedation Documentation and Exam:   I have personally completed a history, physical exam & review of systems for this patient (see notes).     Mallampati Airway Assessment:  Mallampati Class III - (soft palate & base of uvula are visible)    Prior History of Anesthesia Complications:   none    ASA Classification:  Class 3 - A patient with severe systemic disease that limits activity but is not incapacitating    Sedation/ Anesthesia Plan:   Local    Medications Planned:   Lidocaine    Patient is an appropriate candidate for plan of sedation: yes    Electronically signed by Osvaldo Bowden MD on 4/6/2022 at 3:38 PM

## 2022-04-06 NOTE — PROGRESS NOTES
Patient admitted, consent signed and questions answered. Patient ready for procedure. Call light to reach with side rails up 2 of 2. [address] at bedside with patient. History and physical complete.

## 2022-04-06 NOTE — ED TRIAGE NOTES
Mode of arrival (squad #, walk in, police, etc) : walk in        Chief complaint(s): Abnormal Lab        Arrival Note (brief scenario, treatment PTA, etc). : pt reports her PCP called her this evening to notify her that her lab work she had done today showed some critical results. Pt had the blood work done after half a dialysis treatment. The doctor told pt her troponin and D-dimer were elevated today. Pts PCP informed her that those labs are elevated normally for her but they are higher than normal today. This was the pts 1st treatment in over a month and a half. Pt was unable to finish the session due to the pts port not properly working. Pt. Has a fistula in her left arm but it is not large enough. Pt. Has no complaints at the time other than a headache.      C= \"Have you ever felt that you should Cut down on your drinking? \"  No  A= \"Have people Annoyed you by criticizing your drinking? \"  No  G= \"Have you ever felt bad or Guilty about your drinking? \"  No  E= \"Have you ever had a drink as an Eye-opener first thing in the morning to steady your nerves or to help a hangover? \"  No      Deferred []      Reason for deferring: N/A    *If yes to two or more: probable alcohol abuse. *

## 2022-04-06 NOTE — ED NOTES
Pt told Dr. Ana Cristina Irwin she had some chest pain the other day that she did not tell anyone about. Pt reports it felt like she had an irregular heartbeat but the pain did not last long so she did not say anything about it.      Adeline Cisse RN  04/05/22 2514

## 2022-04-06 NOTE — PROGRESS NOTES
Patients . Dr Nay Mercado notifed via telephone. Order for 12 units humalog received.   See [facility]

## 2022-04-06 NOTE — H&P
[facility] CATH LAB  [place] [address]  Dept: [phone]  Loc: [phone]     Patient: Celia Lundberg  : 1960  MRN: [id number]  DOS: 2022    Referring provider:  Jose Escalante MD         HPI:  Celia Lundberg is a 57 y.o. female who comes to the hospital for problems with her right IJ tunneled catheter. The catheter has been in place since 2021 and has not needed replacement. It is no longer working and she is in need of dialysis. She has a left upper extremity proximal radiocephalic fistula just below the elbow crease which does have a palpable thrill but has not yet been usable for dialysis. They attempted using this at one point or another but were always unsuccessful. Today she was in dialysis and the catheter is not usable and not working.   Past Medical History:   Diagnosis Date    Backache, unspecified     CHF (congestive heart failure) (HCC)     Chronic kidney disease     Coronary atherosclerosis of artery bypass graft     COVID 2022    Cramp of limb     Gallstones     Hypertension     Insomnia     Pneumonia     Type II or unspecified type diabetes mellitus with renal manifestations, not stated as uncontrolled(250.40)     Type II or unspecified type diabetes mellitus without mention of complication, not stated as uncontrolled     Unspecified vitamin D deficiency      Family History   Problem Relation Age of Onset    Diabetes Father     Heart Failure Father       Social History     Socioeconomic History    Marital status: Single     Spouse name: Not on file    Number of children: Not on file    Years of education: Not on file    Highest education level: Not on file   Occupational History    Not on file   Tobacco Use    Smoking status: Never Smoker    Smokeless tobacco: Never Used   Vaping Use    Vaping Use: Never used   Substance and Sexual Activity    Alcohol use: No    Drug use: No    Sexual activity: Not Currently   Other Topics Concern    Not on file   Social History Narrative    Not on file     Social Determinants of Health     Financial Resource Strain:     Difficulty of Paying Living Expenses: Not on file   Food Insecurity: No Food Insecurity    Worried About Running Out of Food in the Last Year: Never true    Nany of Food in the Last Year: Never true   Transportation Needs: No Transportation Needs    Lack of Transportation (Medical): No    Lack of Transportation (Non-Medical): No   Physical Activity: Inactive    Days of Exercise per Week: 0 days    Minutes of Exercise per Session: 0 min   Stress: Stress Concern Present    Feeling of Stress :  To some extent   Social Connections: Socially Isolated    Frequency of Communication with Friends and Family: More than three times a week    Frequency of Social Gatherings with Friends and Family: More than three times a week    Attends Zoroastrian Services: Never    Active Member of Clubs or Organizations: No    Attends Club or Organization Meetings: Never    Marital Status: Never    Intimate Partner Violence:     Fear of Current or Ex-Partner: Not on file    Emotionally Abused: Not on file    Physically Abused: Not on file    Sexually Abused: Not on file   Housing Stability: [address] Unable to Pay for Housing in the Last Year: No    Number of [place] in the Last Year: 1    Unstable Housing in the Last Year: No      Past Surgical History:   Procedure Laterality Date    ANKLE FRACTURE SURGERY      AV FISTULA CREATION  12/14/2021    [address]      x3    DIALYSIS FISTULA CREATION Left 12/14/2021    LEFT AV [address] performed by Maite Ruvalcaba MD at [address] 5 YEARS  8/18/2021    IR TUNNELED [address] [facility] 5 YEARS 8/18/2021 [facility] SPECIAL PROCEDURES    KNEE ARTHROSCOPY      right    OTHER SURGICAL HISTORY  04/06/2022    cvc exchange    TONSILLECTOMY        Review of Systems   Constitutional: Negative for activity change, fever and unexpected weight change. HENT: Negative for trouble swallowing and voice change. Eyes: Negative for pain and visual disturbance. Respiratory: Negative for cough, chest tightness and shortness of breath. Cardiovascular: Negative for chest pain and palpitations. Gastrointestinal: Negative for abdominal distention, abdominal pain and vomiting. Endocrine: Negative for cold intolerance and heat intolerance. Genitourinary: Negative for dysuria, flank pain and hematuria. Musculoskeletal: Negative for joint swelling and neck pain. Skin: Negative for color change and rash. Allergic/Immunologic: Negative for immunocompromised state. Neurological: Negative for syncope, speech difficulty, weakness, numbness and headaches. Hematological: Negative for adenopathy. Psychiatric/Behavioral: Negative for behavioral problems and suicidal ideas. Vitals:    04/06/22 1319   BP: (!) 118/52   Pulse: 79   Resp: 22   SpO2: 94%   Weight: 248 lb (112.5 kg)          Physical Exam  Constitutional:       General: She is not in acute distress. Appearance: She is obese. HENT:      Mouth/Throat:      Mouth: Mucous membranes are moist.      Pharynx: Oropharynx is clear. Eyes:      General: No scleral icterus. Extraocular Movements: Extraocular movements intact. Conjunctiva/sclera: Conjunctivae normal.   Neck:      Thyroid: No thyroid mass or thyromegaly. Cardiovascular:      Rate and Rhythm: Normal rate and regular rhythm. Heart sounds: No murmur heard. Comments: She has a palpable thrill in the left upper extremity proximal radiocephalic fistula.   She has a palpable pulse in the cephalic vein when compressed at the level of the deltopectoral and delta brachial grooves. She has a palpable distal radial pulse in the same upper extremity. The right IJ tunneled catheter is in place and not with infection. She has some minimal edema in both lower extremities. She is not short of breath. Pulmonary:      Effort: No respiratory distress. Breath sounds: No rales. Abdominal:      General: There is no distension. Palpations: There is no mass. Tenderness: There is no abdominal tenderness. There is no guarding. Musculoskeletal:      Cervical back: No rigidity or tenderness. Lymphadenopathy:      Cervical: No cervical adenopathy. Skin:     Coloration: Skin is not jaundiced. Findings: No rash. Neurological:      General: No focal deficit present. Mental Status: She is alert and oriented to person, place, and time. Cranial Nerves: No cranial nerve deficit. Psychiatric:         Mood and Affect: Mood normal.         Assessment:  No diagnosis found. Plan:  Currently she is without usable dialysis access. I would hate to place a tunneled catheter in the left upper extremity/left IJ as I would like to preserve that axilla subclavian system. I think I will wire the catheter out in place and the 1 in the original tract as it is not infected. The next step next week will be to go ahead with a fistulogram to see what is possible in the left upper extremity for access. She has hyperglycemia at this point with a sugar in the 3-400 range. We are treating this with insulin. Her potassium is still within normal limits. We will do this under local anesthetic and hopefully she will be able to dialyze tomorrow.     Electronically signed by:  Adolfo Etienne MD

## 2022-04-06 NOTE — OP NOTE
Operative Note      Patient: Ludwig Ovalle  YOB: 1960  MRN: [id number]    Date of Procedure: April 6, 2022    Pre-Op Diagnosis: Nonfunctioning right IJ tunneled dialysis catheter    Post-Op Diagnosis: Same       Procedure:  Exchange of right IJ tunneled catheter over a wire. Balloon angioplasty of the superior vena cava and inferior vena cava in the juxtacardiac position with an 8 mm x 80 balloon. Assistant:   None  Anesthesia: Local    Estimated Blood Loss (mL): Minimal    Complications: None    Specimens:   None  Implants:  23 cm cuffed tunneled dialysis access catheter through the existing tunnel  Drains: None    Findings: There did not seem to be any investing sheath around the catheter in the SVC as an 8 mm balloon dilated to profile very easily under low atmospheres. It pulled back and forth easily within the superior vena cava and inferior vena cava. The catheter had excellent aspiration and ability to flush easily. Detailed Description of Procedure: The patient was brought to the operating room placed in the supine position with her arms tucked at her sides. She was identified as [place] for right IJ tunneled catheter replacement. She was given no sedation. Her chest wall was prepped and draped in a sterile fashion. The cuff of the catheter was then dissected from its investing tissues with blunt dissection using a hemostat and then scissors. A wire was then placed within the catheter and into the inferior vena cava. The catheter was removed over the wire and an 8 mm balloon was placed in the superior vena cava and inferior vena cava and inflated to profile at 3 to 4 jayne. It was moved back and forth with ease displaying no investing fibrinous sheath that had formed. The balloon was removed and another 23 cm cuffed tunneled catheter was then placed in the same tunnel. The cuff on the original catheter was extremely close to the skin.   This cuff was placed approximately 3 to 4 cm into the subcutaneous tissues past the entry site. The wire was then removed and the catheter was flushed easily after it aspirated very easily. It was locked with 1500 units of heparin per port at a total of 2 cc of volume per report. The catheter was then sewn in place with 3-0 nylon at the hub and around the catheter itself at the entry point. The patient was transferred to the recovery room and will be discharged to home with follow-up in the office in the near future to allow planning for further fistula revision or new fistula formation in the left upper extremity. I decided not to place a new catheter in the left as I did not want to violate the left axilla subclavian system as that is the side that we are working with to create a working fistula versus graft.     Electronically signed by Leesa Pena MD on 4/6/2022 at 4:52 PM

## 2022-04-06 NOTE — ED PROVIDER NOTES
[address]  eMERGENCY dEPARTMENT eNCOUnter      Pt Name: Jonn Marsh  MRN: [id number]  [place] 1960  Date of evaluation: 4/5/22      CHIEF COMPLAINT       Chief Complaint   Patient presents with    Abnormal Lab         HISTORY OF PRESENT ILLNESS    Jonn Marsh is a 57 y.o. female who presents complaining of abnormal labs. Patient was sent in by her family doctor because she had abnormal elevated troponin and D-dimer on routine blood work today. Patient has history of reactive lung disease that is a newer diagnosis. He was just checking labs to verify that there is nothing else going on. Patient is a dialysis patient that has been off dialysis for a long time because of kidney that improved. She was on it because of a CT contrast nephropathy. Patient just started back on it today unclear exactly why they restarted it. Patient does admit that 2 days ago she had chest pain substernal radiating to the left the last about 30 minutes with no shortness of breath or palpitations. Patient has had increasing swelling in the lower extremities. REVIEW OF SYSTEMS       Review of Systems   Constitutional: Negative for activity change, appetite change, chills, diaphoresis and fever. HENT: Negative for congestion, ear pain, facial swelling, nosebleeds, rhinorrhea, sinus pressure, sore throat and trouble swallowing. Eyes: Negative for pain, discharge and redness. Respiratory: Negative for cough, chest tightness, shortness of breath and wheezing. Cardiovascular: Positive for chest pain and leg swelling. Negative for palpitations. Gastrointestinal: Negative for abdominal pain, blood in stool, constipation, diarrhea, nausea and vomiting. Genitourinary: Negative for difficulty urinating, dysuria, flank pain, frequency, genital sores and hematuria. Musculoskeletal: Negative for arthralgias, back pain, gait problem, joint swelling, myalgias and neck pain.    Skin: Negative for color tablet, Refills: 0      sodium bicarbonate 650 MG tablet Take 1 tablet by mouth 3 times daily  Qty: 90 tablet, Refills: 0    Associated Diagnoses: Benign hypertension with CKD (chronic kidney disease) stage IV ([facility]); CKD (chronic kidney disease), stage IV ([facility] [address] Secondary diabetes mellitus with stage 4 chronic kidney disease [facility] Other proteinuria; Anemia in stage 4 chronic kidney disease ([facility])      Insulin Pen Needle (EASY TOUCH PEN NEEDLES) 29G X 12MM MISC 1 each by Does not apply route daily  Qty: 100 each, Refills: 5      !! ReliOn Lancets Micro-Thin 33G MISC USE AS DIRECTED EVERY DAY      blood glucose test strips (TRUE METRIX BLOOD GLUCOSE TEST) strip 1 each by In Vitro route 3 times daily Uncontrolled diabetes  Qty: 100 each, Refills: 11    Associated Diagnoses: Diabetes mellitus due to underlying condition with diabetic nephropathy, with long-term current use of insulin ([facility] Uncontrolled type 2 diabetes mellitus with hyperglycemia ([facility])      HUMALOG 100 UNIT/ML injection vial       insulin glargine (BASAGLAR KWIKPEN) 100 UNIT/ML injection pen Inject 73 Units into the skin 2 times daily  Qty: 10 pen, Refills: 0      Blood Glucose Monitoring Suppl (TRUE METRIX GO GLUCOSE METER) w/Device KIT 1 each by Does not apply route 4 times daily  Qty: 1 kit, Refills: 1    Associated Diagnoses: Diabetes mellitus due to underlying condition with diabetic nephropathy, with long-term current use of insulin ([facility] Uncontrolled type 2 diabetes mellitus with hyperglycemia ([facility])      Lancet Device MISC 1 Device by Does not apply route once for 1 dose  Qty: 100 each, Refills: 0      !! Lancets MISC 1 each by Does not apply route daily  Qty: 100 each, Refills: 11      ELIQUIS 5 MG TABS tablet Take 1 tablet by mouth 2 times daily  Qty: 60 tablet, Refills: 0      gabapentin (NEURONTIN) 300 MG capsule Take 1 capsule by mouth 2 times daily for 30 days.   Qty: 60 capsule, Refills: 0    Associated Diagnoses: Spinal stenosis of lumbar region with neurogenic claudication; Chronic midline low back pain with bilateral sciatica      febuxostat (ULORIC) 40 MG TABS tablet Take 1 tablet by mouth daily  Qty: 30 tablet, Refills: 0      docusate sodium (COLACE) 100 MG capsule Take 1 capsule by mouth 2 times daily  Qty: 60 capsule, Refills: 0      tamsulosin (FLOMAX) 0.4 MG capsule Take 1 capsule by mouth daily  Qty: 30 capsule, Refills: 3      atorvastatin (LIPITOR) 80 MG tablet Take 1 tablet by mouth daily  Qty: 30 tablet, Refills: 5    Associated Diagnoses: Mixed hyperlipidemia      aspirin 81 MG chewable tablet Take 1 tablet by mouth daily  Qty: 30 tablet, Refills: 0      ranolazine (RANEXA) 1000 MG extended release tablet Take 1 tablet by mouth 2 times daily  Qty: 60 tablet, Refills: 0    Associated Diagnoses: Coronary artery disease involving native coronary artery of native heart without angina pectoris      busPIRone (BUSPAR) 7.5 MG tablet Take 1 tablet by mouth 3 times daily  Qty: 90 tablet, Refills: 0      carvedilol (COREG) 6.25 MG tablet Take 1 tablet by mouth 2 times daily  Qty: 60 tablet, Refills: 0    Associated Diagnoses: Coronary artery disease involving native coronary artery of native heart without angina pectoris      pantoprazole (PROTONIX) 40 MG tablet Take 1 tablet by mouth every morning (before breakfast)  Qty: 30 tablet, Refills: 0       !! - Potential duplicate medications found. Please discuss with provider. ALLERGIES     is allergic to adhesive tape, ace inhibitors, iv dye [iodides], and metformin and related. FAMILY HISTORY     [unfilled]     SOCIAL HISTORY      reports that she has never smoked. She has never used smokeless tobacco. She reports that she does not drink alcohol and does not use drugs. PHYSICAL EXAM     INITIAL VITALS: BP (!) 177/66   Pulse 78   Temp 98.4 °F (36.9 °C) (Oral)   Resp 20   Wt 250 lb (113.4 kg)   SpO2 96%   BMI 41.60 kg/m²      Physical Exam  Vitals and nursing note reviewed. attempt to get a VQ scan because the elevated D-dimer. DIAGNOSTIC RESULTS     EKG: All EKG's are interpreted by the Emergency Department Physician who either signs or Co-signs this chart in the absence of a cardiologist.    EKG Interpretation    Interpreted by emergency department physician    Rhythm: normal sinus   Rate: normal  Axis: normal  Ectopy: premature atrial contraction  Conduction: normal  ST Segments: nonspecific changes  T Waves: non specific changes  Q Waves: none    Clinical Impression: EKG: normal sinus rhythm, nonspecific ST and T waves changes, PAC's noted. Khalida Mccain MD        RADIOLOGY:All plain film, CT, MRI, and formal ultrasound images (except ED bedside ultrasound)are read by the radiologist and interpretations are directly viewed by the emergency physician. XR CHEST (2 VW)    Result Date: 4/5/2022  EXAMINATION: TWO XRAY VIEWS OF THE CHEST 4/5/2022 4:54 pm COMPARISON: 03/09/2022 HISTORY: ORDERING SYSTEM PROVIDED HISTORY: Shortness of breath on exertion TECHNOLOGIST PROVIDED HISTORY: Shortness of breath on exertion, hypoxemia Reason for Exam: Pt states SOB on exertion x 3 weeks pt states dialysis triple bypass and stent placement FINDINGS: Status post median sternotomy. Right-sided central venous catheter remains in place. The lungs are without acute focal process. There is no effusion or pneumothorax. The cardiomediastinal silhouette is stable. The osseous structures are stable. No acute process. Stable cardiomegaly     NM LUNG VENT/PERFUSION (VQ)    Result Date: 4/6/2022  EXAMINATION: NUCLEAR MEDICINE VENTILATION PERFUSION SCAN. 4/6/2022 TECHNIQUE: 25 millicuries aerosolized Tc99m DTPA was administered via mask prior to planar imaging of the lungs in multiple projections. Then, 5 millicuries of Tc 13R MAA was administered intravenously prior to planar imaging of the lungs in similar projections. COMPARISON: Chest radiograph 04/05/2022.  HISTORY: ORDERING SYSTEM PROVIDED HISTORY: SOB, elevated d-dimer TECHNOLOGIST PROVIDED HISTORY: SOB, elevated d-dimer Decision Support Exception - unselect if not a suspected or confirmed emergency medical condition->Emergency Medical Condition (MA) Reason for Exam: SOB, elevated D.dimer Additional signs and symptoms: SOB, CP, cough, restrictive lung disease , hx of clots in legs a few years ago , elevated d.dimer  7.92 FINDINGS: PERFUSION: Distribution of radiotracer is homogeneous. No segmental defects identified. VENTILATION: Central deposition of radiotracer is compatible with COPD. CHEST RADIOGRAPH: Cardiomegaly is demonstrated. Questionable small left pleural effusion. Low probability for pulmonary embolus. LABS: All lab results were reviewed bymyself, and all abnormals are listed below.   Labs Reviewed   CBC WITH AUTO DIFFERENTIAL - Abnormal; Notable for the following components:       Result Value    RBC 3.67 (*)     Hemoglobin 11.4 (*)     Hematocrit 34.5 (*)     RDW 15.2 (*)     Platelets 618 (*)     Seg Neutrophils 74 (*)     Lymphocytes 15 (*)     Absolute Lymph # 0.90 (*)     All other components within normal limits   BASIC METABOLIC PANEL - Abnormal; Notable for the following components:    Glucose 587 (*)     CREATININE 1.73 (*)     Calcium 8.3 (*)     Sodium 133 (*)     Potassium 3.6 (*)     CO2 18 (*)     GFR Non- 30 (*)     GFR  36 (*)     All other components within normal limits   TROPONIN - Abnormal; Notable for the following components:    Troponin, High Sensitivity 74 (*)     All other components within normal limits         EMERGENCY DEPARTMENT COURSE:   Vitals:    Vitals:    04/05/22 2233 04/06/22 0058   BP: (!) 161/56 (!) 177/66   Pulse: 78 78   Resp: 20 20   Temp: 98.4 °F (36.9 °C)    TempSrc: Oral    SpO2: 95% 96%   Weight: 250 lb (113.4 kg)        The patient was given the following medications while in the emergency department:     Orders Placed This Encounter Medications    technetium 99m DTPA solution 44 millicurie    sodium chloride flush 0.9 % injection 10 mL    technetium albumin aggregated (MAA) solution 8 millicurie    insulin lispro (HUMALOG) injection vial 14 Units       -------------------------  1:42 AM EDT  Patient was updated on results and plan for discharge home. Her troponins remained stable therefore I do not believe that she has had a heart attack. Her VQ scan is low probability therefore the elevated D-dimer I think is related to her renal failure. We have started treating hyperglycemia and she will take her long-acting insulin when she gets home. CRITICAL CARE:   None    CONSULTS:  None    PROCEDURES:  None    FINAL IMPRESSION      1. Hyperglycemia    2.  Feared complaint without diagnosis          DISPOSITION/PLAN   DISPOSITION Decision To Discharge 04/06/2022 01:41:17 AM      PATIENT REFERRED TO:  AFSANEH Chavez CNP  [facility]  [address]  [address] [place] [address]  [phone]    In 1 week      [facility] [place] [facility]  [address]  [address]  [phone]    If symptoms worsen      DISCHARGE MEDICATIONS:  Current Discharge Medication List          (Please note that portions of this note were completed with a voice recognition program.  Efforts were made to edit the dictations but occasionally words are mis-transcribed.)    Lia Ortega MD  Attending Dixie Martinez MD  04/06/22 6665

## 2022-04-06 NOTE — PROGRESS NOTES
Received post CVC exchange procedure to [facility] room 9. Assessment obtained. Restrictions reviewed with patient. Post procedure pathway initiated. Right chest site soft , dressing dry and intact. No hematoma noted. Family at side.

## 2022-04-06 NOTE — PROGRESS NOTES
All discharge instructions reviewed, questions answered, paper signed and given copy. Patient discharged per wheelchair with sisters and belongings.

## 2022-04-07 ENCOUNTER — APPOINTMENT (OUTPATIENT)
Dept: PHYSICAL THERAPY | Age: 62
End: 2022-04-07
Payer: COMMERCIAL

## 2022-04-07 ENCOUNTER — APPOINTMENT (OUTPATIENT)
Dept: OCCUPATIONAL THERAPY | Age: 62
End: 2022-04-07
Payer: COMMERCIAL

## 2022-04-07 ENCOUNTER — TELEPHONE (OUTPATIENT)
Dept: VASCULAR SURGERY | Age: 62
End: 2022-04-07

## 2022-04-07 ASSESSMENT — SOCIAL DETERMINANTS OF HEALTH (SDOH): HOW HARD IS IT FOR YOU TO PAY FOR THE VERY BASICS LIKE FOOD, HOUSING, MEDICAL CARE, AND HEATING?: NOT VERY HARD

## 2022-04-07 NOTE — TELEPHONE ENCOUNTER
Patient just had a catheter exchanged yesterday and it still is not working, the patient is unable to receive dialysis treatment, last treatment was on Thursdays but they said that it was not running very good and before that it was a month between treatments while she was waiting to be transferred from a different facility, DEDE Nieto states that when she flushes catheter line it is very sluggish. She did try to reverse the lines and she did try repositioning the patient, they did not use activase. RN, Hardeep Nieto states that they are drawing a stat potassium on the patient, she did reach out to the nephrology NP to see what they would advise. What would be our next step? Please Advise.

## 2022-04-07 NOTE — CARE COORDINATION
Ambulatory Care Coordination Note  4/7/2022  CM Risk Score: 7  Charlson 10 Year Mortality Risk Score: 100%     ACC: Maria Del Rosario Blackmon RN    Summary Note: Spoke with patient who stated she is doing ok today. She had her dialysis catheter changed yesterday and has dialysis today. She denied any needs from PCP today. Pulse ox reported to be in the 90's. She is wearing her home O2 continuously as directed. ACM will follow up in 5-7 days. Care Coordination Interventions    Program Enrollment: Complex Care  Referral from Primary Care Provider: Yes  Suggested Interventions and Community Resources  Diabetes Education: In Process  Physical Therapy: Completed  Transportation Support: Declined  Zone Management Tools: In Process         Goals Addressed                 This Visit's Progress     Conditions and Symptoms   On track     I will schedule office visits, as directed by my provider. I will keep my appointment or reschedule if I have to cancel. I will notify my provider of any barriers to my plan of care. I will follow my Zone Management tool to seek urgent or emergent care. I will notify my provider of any symptoms that indicate a worsening of my condition. Barriers: overwhelmed by complexity of regimen  Plan for overcoming my barriers: work with ACM   Confidence: 9/10  Anticipated Goal Completion Date: 4.26.22              Prior to Admission medications    Medication Sig Start Date End Date Taking?  Authorizing Provider   acetaminophen (TYLENOL) 325 MG tablet Take 650 mg by mouth every 6 hours as needed for Pain    Historical Provider, MD   traZODone (DESYREL) 50 MG tablet TAKE 1 & 1/2 (ONE & ONE-HALF) TABLETS BY MOUTH EVERY NIGHT 3/8/22   Pineda Mora, APRN - CNP   furosemide (LASIX) 80 MG tablet Take 1 tablet by mouth 2 times daily 3/8/22   Pineda Mora, APRN - CNP   sodium bicarbonate 650 MG tablet Take 1 tablet by mouth 3 times daily 3/2/22   Jan Patricia MD   Insulin Pen Needle (EASY TOUCH PEN NEEDLES) 29G X 12MM MISC 1 each by Does not apply route daily 3/1/22   AFSANEH Hernandez CNP   ReliOn Lancets Micro-Thin 33G MISC USE AS DIRECTED EVERY DAY 1/28/22   Historical Provider, MD   blood glucose test strips (TRUE METRIX BLOOD GLUCOSE TEST) strip 1 each by In Vitro route 3 times daily Uncontrolled diabetes 2/2/22 3/11/22  AFSANEH Hernandez CNP   HUMALOG 100 UNIT/ML injection vial  11/29/21   Historical Provider, MD   insulin glargine (BASAGLAR KWIKPEN) 100 UNIT/ML injection pen Inject 73 Units into the skin 2 times daily 11/30/21   AFSANEH Hernandez CNP   Blood Glucose Monitoring Suppl (TRUE METRIX GO GLUCOSE METER) w/Device KIT 1 each by Does not apply route 4 times daily 11/30/21   AFSANEH Hernandez CNP   Lancet Device MISC 1 Device by Does not apply route once for 1 dose 11/30/21 11/30/21  AFSANEH Hernandez CNP   Lancets MISC 1 each by Does not apply route daily 11/30/21   AFSANEH Hernandez CNP   gabapentin (NEURONTIN) 300 MG capsule Take 1 capsule by mouth 2 times daily for 30 days.  11/22/21 3/11/22  Alfredo Seals MD   febuxostat (ULORIC) 40 MG TABS tablet Take 1 tablet by mouth daily 11/22/21   Alfredo Seals MD   docusate sodium (COLACE) 100 MG capsule Take 1 capsule by mouth 2 times daily 11/22/21   Alfredo Seals MD   tamsulosin [facility]) 0.4 MG capsule Take 1 capsule by mouth daily 11/23/21   Alfredo Seals MD   atorvastatin (LIPITOR) 80 MG tablet Take 1 tablet by mouth daily 11/3/21   AFSANEH Hernandez CNP   aspirin 81 MG chewable tablet Take 1 tablet by mouth daily 9/25/21   Patricia Harris MD   ranolazine (RANEXA) 1000 MG extended release tablet Take 1 tablet by mouth 2 times daily 9/25/21   Patricia Harris MD   busPIRone (BUSPAR) 7.5 MG tablet Take 1 tablet by mouth 3 times daily 9/25/21   Patricia Harris MD   carvedilol (COREG) 6.25 MG tablet Take 1 tablet by mouth 2 times daily 9/25/21   Patricia Harris MD   pantoprazole (PROTONIX) 40 MG tablet Take 1 tablet by mouth every morning (before breakfast) 9/25/21   Ed MD Henrry   allopurinol (ZYLOPRIM) 100 MG tablet Take 1 tablet by mouth daily 4/14/21   AFSANEH Maxwell - CNP       Future Appointments   Date Time Provider Balbir Alvaraodi   4/22/2022  3:45 PM Pacheco Leal MD heartvasc [facility]   4/28/2022  9:40 AM Avelino Dozier MD Neuro Spec [facility]   4/29/2022 10:00 AM Ajit Sam MD Resp Spec [id number]     ,   Diabetes Assessment    Meal Planning: Other          ,   Congestive Heart Failure Assessment    Are you currently restricting fluids?: Other  Do you understand a low sodium diet?: Yes  Do you understand how to read food labels?: Yes     No patient-reported symptoms      Symptoms:     Weight trend: stable  Salt intake watch compared to last visit: stable      and   General Assessment    Do you have any symptoms that are causing concern?: No

## 2022-04-08 ENCOUNTER — HOSPITAL ENCOUNTER (OUTPATIENT)
Dept: INTERVENTIONAL RADIOLOGY/VASCULAR | Age: 62
Discharge: HOME | End: 2022-04-10
Payer: COMMERCIAL

## 2022-04-08 ENCOUNTER — TELEPHONE (OUTPATIENT)
Dept: VASCULAR SURGERY | Age: 62
End: 2022-04-08

## 2022-04-08 VITALS
OXYGEN SATURATION: 95 % | RESPIRATION RATE: 19 BRPM | SYSTOLIC BLOOD PRESSURE: 174 MMHG | HEART RATE: 76 BPM | DIASTOLIC BLOOD PRESSURE: 74 MMHG

## 2022-04-08 DIAGNOSIS — I82.90 CLOT: ICD-10-CM

## 2022-04-08 PROCEDURE — 2580000003 HC RX 258: Performed by: RADIOLOGY

## 2022-04-08 PROCEDURE — 36581 REPLACE TUNNELED CV CATH: CPT

## 2022-04-08 PROCEDURE — 6360000002 HC RX W HCPCS: Performed by: RADIOLOGY

## 2022-04-08 PROCEDURE — 77001 FLUOROGUIDE FOR VEIN DEVICE: CPT

## 2022-04-08 PROCEDURE — C1750 CATH, HEMODIALYSIS,LONG-TERM: HCPCS

## 2022-04-08 RX ORDER — HEPARIN SODIUM 1000 [USP'U]/ML
INJECTION, SOLUTION INTRAVENOUS; SUBCUTANEOUS
Status: COMPLETED | OUTPATIENT
Start: 2022-04-08 | End: 2022-04-08

## 2022-04-08 RX ADMIN — CEFAZOLIN SODIUM 1000 MG: 1 INJECTION, POWDER, FOR SOLUTION INTRAMUSCULAR; INTRAVENOUS at 16:08

## 2022-04-08 RX ADMIN — HEPARIN SODIUM 1600 UNITS: 1000 INJECTION, SOLUTION INTRAVENOUS; SUBCUTANEOUS at 16:16

## 2022-04-08 RX ADMIN — HEPARIN SODIUM 1600 UNITS: 1000 INJECTION, SOLUTION INTRAVENOUS; SUBCUTANEOUS at 16:15

## 2022-04-08 NOTE — TELEPHONE ENCOUNTER
Britta Rosado from dialysis called and stated she is going to send Delphine Peter to ER because she is unable to get her in to IR. She wanted me to inform you.

## 2022-04-08 NOTE — PROGRESS NOTES
Patient tolerated tunneled catheter exchange without distress. Dressing to site. Discharge instructions given, no questions at this time. Patient discharged home with family.

## 2022-04-11 ENCOUNTER — CARE COORDINATION (OUTPATIENT)
Dept: CARE COORDINATION | Age: 62
End: 2022-04-11

## 2022-04-11 DIAGNOSIS — G89.29 CHRONIC MIDLINE LOW BACK PAIN WITH BILATERAL SCIATICA: ICD-10-CM

## 2022-04-11 DIAGNOSIS — M54.42 CHRONIC MIDLINE LOW BACK PAIN WITH BILATERAL SCIATICA: ICD-10-CM

## 2022-04-11 DIAGNOSIS — M48.062 SPINAL STENOSIS OF LUMBAR REGION WITH NEUROGENIC CLAUDICATION: ICD-10-CM

## 2022-04-11 DIAGNOSIS — M54.41 CHRONIC MIDLINE LOW BACK PAIN WITH BILATERAL SCIATICA: ICD-10-CM

## 2022-04-11 RX ORDER — GABAPENTIN 300 MG/1
300 CAPSULE ORAL 2 TIMES DAILY
Qty: 60 CAPSULE | Refills: 5 | Status: ON HOLD | OUTPATIENT
Start: 2022-04-11 | End: 2022-06-06

## 2022-04-11 NOTE — CARE COORDINATION
Patient called asking if a refill of gabapentin could be sent to [facility] her normal pharmacy has been closed. ACM pended RX to PCP. Patient denied any additional concerns today. ACM will follow up in 2 weeks.

## 2022-04-13 ENCOUNTER — HOSPITAL ENCOUNTER (INPATIENT)
Age: 62
LOS: 15 days | Discharge: NURSING, REHAB OR LONG-TERM CARE | DRG: 637 | End: 2022-04-28
Attending: EMERGENCY MEDICINE | Admitting: FAMILY MEDICINE
Payer: COMMERCIAL

## 2022-04-13 ENCOUNTER — APPOINTMENT (OUTPATIENT)
Dept: GENERAL RADIOLOGY | Age: 62
DRG: 637 | End: 2022-04-13
Payer: COMMERCIAL

## 2022-04-13 ENCOUNTER — CARE COORDINATION (OUTPATIENT)
Dept: CARE COORDINATION | Age: 62
End: 2022-04-13

## 2022-04-13 DIAGNOSIS — R06.00 DYSPNEA, UNSPECIFIED TYPE: ICD-10-CM

## 2022-04-13 DIAGNOSIS — E11.10 DIABETIC KETOACIDOSIS WITHOUT COMA ASSOCIATED WITH TYPE 2 DIABETES MELLITUS (HCC): Primary | ICD-10-CM

## 2022-04-13 LAB
-: ABNORMAL
ABSOLUTE EOS #: 0.1 K/UL (ref 0–0.4)
ABSOLUTE LYMPH #: 0.9 K/UL (ref 1–4.8)
ABSOLUTE MONO #: 0.4 K/UL (ref 0.1–1.3)
ALLEN TEST: ABNORMAL
ANION GAP SERPL CALCULATED.3IONS-SCNC: 25 MMOL/L (ref 9–17)
BACTERIA: ABNORMAL
BASOPHILS # BLD: 1 % (ref 0–2)
BASOPHILS ABSOLUTE: 0.1 K/UL (ref 0–0.2)
BETA-HYDROXYBUTYRATE: 7.57 MMOL/L (ref 0.02–0.27)
BILIRUBIN URINE: NEGATIVE
BUN BLDV-MCNC: 18 MG/DL (ref 8–23)
CALCIUM SERPL-MCNC: 8.7 MG/DL (ref 8.6–10.4)
CARBOXYHEMOGLOBIN: 2.9 % (ref 0–5)
CHLORIDE BLD-SCNC: 94 MMOL/L (ref 98–107)
CO2: 18 MMOL/L (ref 20–31)
COLOR: YELLOW
CREAT SERPL-MCNC: 1.78 MG/DL (ref 0.5–0.9)
EOSINOPHILS RELATIVE PERCENT: 2 % (ref 0–4)
EPITHELIAL CELLS UA: ABNORMAL /HPF
GFR AFRICAN AMERICAN: 35 ML/MIN
GFR NON-AFRICAN AMERICAN: 29 ML/MIN
GFR SERPL CREATININE-BSD FRML MDRD: ABNORMAL ML/MIN/{1.73_M2}
GLUCOSE BLD-MCNC: 191 MG/DL
GLUCOSE BLD-MCNC: 219 MG/DL (ref 65–105)
GLUCOSE BLD-MCNC: 244 MG/DL (ref 65–105)
GLUCOSE BLD-MCNC: 332 MG/DL
GLUCOSE BLD-MCNC: 332 MG/DL (ref 65–105)
GLUCOSE BLD-MCNC: 521 MG/DL (ref 65–105)
GLUCOSE BLD-MCNC: 575 MG/DL (ref 70–99)
GLUCOSE URINE: ABNORMAL
HCO3 VENOUS: 19 MMOL/L (ref 24–30)
HCT VFR BLD CALC: 35.2 % (ref 36–46)
HEMOGLOBIN: 11.8 G/DL (ref 12–16)
INFLUENZA A: NOT DETECTED
INFLUENZA B: NOT DETECTED
KETONES, URINE: ABNORMAL
LEUKOCYTE ESTERASE, URINE: ABNORMAL
LYMPHOCYTES # BLD: 15 % (ref 24–44)
MAGNESIUM: 2.2 MG/DL (ref 1.6–2.6)
MCH RBC QN AUTO: 31.2 PG (ref 26–34)
MCHC RBC AUTO-ENTMCNC: 33.6 G/DL (ref 31–37)
MCV RBC AUTO: 92.9 FL (ref 80–100)
METHEMOGLOBIN: 0.8 % (ref 0–1.9)
MONOCYTES # BLD: 6 % (ref 1–7)
NEGATIVE BASE EXCESS, VEN: 5 MMOL/L (ref 0–2)
NITRITE, URINE: NEGATIVE
O2 SAT, VEN: 63 % (ref 60–85)
PATIENT TEMP: 37
PCO2, VEN: 27.4 (ref 39–55)
PDW BLD-RTO: 15.4 % (ref 11.5–14.9)
PH UA: 5.5 (ref 5–8)
PH VENOUS: 7.45 (ref 7.32–7.42)
PLATELET # BLD: 195 K/UL (ref 150–450)
PMV BLD AUTO: 9.4 FL (ref 6–12)
PO2, VEN: 31.6 (ref 30–50)
POTASSIUM SERPL-SCNC: 3.3 MMOL/L (ref 3.7–5.3)
PRO-BNP: 2852 PG/ML
PROTEIN UA: ABNORMAL
RBC # BLD: 3.79 M/UL (ref 4–5.2)
RBC UA: ABNORMAL /HPF
REASON FOR REJECTION: NORMAL
SARS-COV-2 RNA, RT PCR: NOT DETECTED
SEG NEUTROPHILS: 76 % (ref 36–66)
SEGMENTED NEUTROPHILS ABSOLUTE COUNT: 4.4 K/UL (ref 1.3–9.1)
SODIUM BLD-SCNC: 137 MMOL/L (ref 135–144)
SOURCE: NORMAL
SPECIFIC GRAVITY UA: 1.03 (ref 1–1.03)
SPECIMEN DESCRIPTION: NORMAL
TROPONIN, HIGH SENSITIVITY: 78 NG/L (ref 0–14)
TURBIDITY: ABNORMAL
URINE HGB: ABNORMAL
UROBILINOGEN, URINE: NORMAL
WBC # BLD: 5.8 K/UL (ref 3.5–11)
WBC UA: ABNORMAL /HPF
YEAST: ABNORMAL
ZZ NTE CLEAN UP: ORDERED TEST: NORMAL
ZZ NTE WITH NAME CLEAN UP: SPECIMEN SOURCE: NORMAL

## 2022-04-13 PROCEDURE — 87636 SARSCOV2 & INF A&B AMP PRB: CPT

## 2022-04-13 PROCEDURE — 71045 X-RAY EXAM CHEST 1 VIEW: CPT

## 2022-04-13 PROCEDURE — 81001 URINALYSIS AUTO W/SCOPE: CPT

## 2022-04-13 PROCEDURE — 84484 ASSAY OF TROPONIN QUANT: CPT

## 2022-04-13 PROCEDURE — 82800 BLOOD PH: CPT

## 2022-04-13 PROCEDURE — 2580000003 HC RX 258: Performed by: STUDENT IN AN ORGANIZED HEALTH CARE EDUCATION/TRAINING PROGRAM

## 2022-04-13 PROCEDURE — 87086 URINE CULTURE/COLONY COUNT: CPT

## 2022-04-13 PROCEDURE — 2000000000 HC ICU R&B

## 2022-04-13 PROCEDURE — 80048 BASIC METABOLIC PNL TOTAL CA: CPT

## 2022-04-13 PROCEDURE — 2500000003 HC RX 250 WO HCPCS: Performed by: STUDENT IN AN ORGANIZED HEALTH CARE EDUCATION/TRAINING PROGRAM

## 2022-04-13 PROCEDURE — 82947 ASSAY GLUCOSE BLOOD QUANT: CPT

## 2022-04-13 PROCEDURE — 2580000003 HC RX 258: Performed by: EMERGENCY MEDICINE

## 2022-04-13 PROCEDURE — 96374 THER/PROPH/DIAG INJ IV PUSH: CPT

## 2022-04-13 PROCEDURE — 82010 KETONE BODYS QUAN: CPT

## 2022-04-13 PROCEDURE — 36415 COLL VENOUS BLD VENIPUNCTURE: CPT

## 2022-04-13 PROCEDURE — 6360000002 HC RX W HCPCS: Performed by: STUDENT IN AN ORGANIZED HEALTH CARE EDUCATION/TRAINING PROGRAM

## 2022-04-13 PROCEDURE — 93005 ELECTROCARDIOGRAM TRACING: CPT | Performed by: STUDENT IN AN ORGANIZED HEALTH CARE EDUCATION/TRAINING PROGRAM

## 2022-04-13 PROCEDURE — 85025 COMPLETE CBC W/AUTO DIFF WBC: CPT

## 2022-04-13 PROCEDURE — 83735 ASSAY OF MAGNESIUM: CPT

## 2022-04-13 PROCEDURE — 6370000000 HC RX 637 (ALT 250 FOR IP): Performed by: STUDENT IN AN ORGANIZED HEALTH CARE EDUCATION/TRAINING PROGRAM

## 2022-04-13 PROCEDURE — 82805 BLOOD GASES W/O2 SATURATION: CPT

## 2022-04-13 PROCEDURE — 83880 ASSAY OF NATRIURETIC PEPTIDE: CPT

## 2022-04-13 PROCEDURE — 6360000002 HC RX W HCPCS: Performed by: EMERGENCY MEDICINE

## 2022-04-13 PROCEDURE — 99285 EMERGENCY DEPT VISIT HI MDM: CPT

## 2022-04-13 RX ORDER — DEXTROSE MONOHYDRATE 50 MG/ML
100 INJECTION, SOLUTION INTRAVENOUS PRN
Status: DISCONTINUED | OUTPATIENT
Start: 2022-04-13 | End: 2022-04-14

## 2022-04-13 RX ORDER — LIDOCAINE AND PRILOCAINE 25; 25 MG/G; MG/G
CREAM TOPICAL PRN
COMMUNITY
End: 2022-09-08 | Stop reason: ALTCHOICE

## 2022-04-13 RX ORDER — CARVEDILOL 3.12 MG/1
3.12 TABLET ORAL 2 TIMES DAILY
COMMUNITY
End: 2022-06-15 | Stop reason: SDUPTHER

## 2022-04-13 RX ORDER — ONDANSETRON 2 MG/ML
4 INJECTION INTRAMUSCULAR; INTRAVENOUS ONCE
Status: COMPLETED | OUTPATIENT
Start: 2022-04-13 | End: 2022-04-13

## 2022-04-13 RX ORDER — FUROSEMIDE 10 MG/ML
60 INJECTION INTRAMUSCULAR; INTRAVENOUS ONCE
Status: COMPLETED | OUTPATIENT
Start: 2022-04-13 | End: 2022-04-14

## 2022-04-13 RX ORDER — DEXTROSE MONOHYDRATE 25 G/50ML
12.5 INJECTION, SOLUTION INTRAVENOUS PRN
Status: DISCONTINUED | OUTPATIENT
Start: 2022-04-13 | End: 2022-04-28 | Stop reason: HOSPADM

## 2022-04-13 RX ORDER — NICOTINE POLACRILEX 4 MG
15 LOZENGE BUCCAL PRN
Status: DISCONTINUED | OUTPATIENT
Start: 2022-04-13 | End: 2022-04-28 | Stop reason: HOSPADM

## 2022-04-13 RX ORDER — MELATONIN 10 MG
10 CAPSULE ORAL NIGHTLY
COMMUNITY

## 2022-04-13 RX ORDER — ACETAMINOPHEN 325 MG/1
650 TABLET ORAL EVERY 4 HOURS PRN
Status: DISCONTINUED | OUTPATIENT
Start: 2022-04-13 | End: 2022-04-28 | Stop reason: HOSPADM

## 2022-04-13 RX ORDER — INSULIN GLARGINE 100 [IU]/ML
70 INJECTION, SOLUTION SUBCUTANEOUS 2 TIMES DAILY
Status: ON HOLD | COMMUNITY
End: 2022-04-20 | Stop reason: SDUPTHER

## 2022-04-13 RX ADMIN — SODIUM CHLORIDE 11.25 UNITS/HR: 9 INJECTION, SOLUTION INTRAVENOUS at 18:36

## 2022-04-13 RX ADMIN — ANTI-FUNGAL POWDER MICONAZOLE NITRATE TALC FREE: 1.42 POWDER TOPICAL at 23:30

## 2022-04-13 RX ADMIN — CEFTRIAXONE SODIUM 1000 MG: 1 INJECTION, POWDER, FOR SOLUTION INTRAMUSCULAR; INTRAVENOUS at 21:40

## 2022-04-13 RX ADMIN — DEXTROSE MONOHYDRATE 100 ML/HR: 50 INJECTION, SOLUTION INTRAVENOUS at 23:21

## 2022-04-13 RX ADMIN — POTASSIUM CHLORIDE: 2 INJECTION, SOLUTION, CONCENTRATE INTRAVENOUS at 18:47

## 2022-04-13 RX ADMIN — ONDANSETRON 4 MG: 2 INJECTION INTRAMUSCULAR; INTRAVENOUS at 16:25

## 2022-04-13 RX ADMIN — POTASSIUM BICARBONATE 40 MEQ: 782 TABLET, EFFERVESCENT ORAL at 17:12

## 2022-04-13 RX ADMIN — DEXTROSE MONOHYDRATE 100 ML/HR: 50 INJECTION, SOLUTION INTRAVENOUS at 21:47

## 2022-04-13 ASSESSMENT — ENCOUNTER SYMPTOMS
VOMITING: 0
ABDOMINAL PAIN: 0
COLOR CHANGE: 0
SHORTNESS OF BREATH: 1
NAUSEA: 1
COUGH: 0

## 2022-04-13 ASSESSMENT — PATIENT HEALTH QUESTIONNAIRE - PHQ9: SUM OF ALL RESPONSES TO PHQ QUESTIONS 1-9: 8

## 2022-04-13 ASSESSMENT — PAIN DESCRIPTION - DESCRIPTORS: DESCRIPTORS: CRAMPING

## 2022-04-13 ASSESSMENT — PAIN DESCRIPTION - FREQUENCY: FREQUENCY: CONTINUOUS

## 2022-04-13 ASSESSMENT — PAIN SCALES - GENERAL: PAINLEVEL_OUTOF10: 7

## 2022-04-13 ASSESSMENT — PAIN DESCRIPTION - LOCATION: LOCATION: FOOT;LEG;CHEST

## 2022-04-13 NOTE — ED PROVIDER NOTES
EMERGENCY DEPARTMENT ENCOUNTER   ATTENDING ATTESTATION     Pt Name: Terry Galeas  MRN: [id number]  [place] 1960  Date of evaluation: 4/13/22       Terry Galeas is a 57 y.o. female who presents with Shortness of Breath      MDM: 58-year-old female presents for complaint of shortness of breath. Patient with history of end-stage renal disease receives dialysis Tuesday Thursday Saturday states feeling more short of breath over the last couple days had a form of dialysis on Tuesday. Reports also that her sugars have been running high. On initial exam patient in no acute distress, vitals are stable, satting well currently on nasal cannula, will check labs and chest x-ray    Labs reviewed patient was found to have a blood sugar in the 500s, elevated anion gap with a decreased CO2 beta hydroxy of 7, findings concerning for DKA, patient started on insulin drip, will give judicious fluid given her history of dialysis, did discuss with nephrology who will take for dialysis tomorrow, discussed with critical care who will see patient patient to be admitted to ICU for continued insulin drip and DKA protocol    Spoke with Dr. Lauren Prasad who accepts admission with nephrology/critical care consult. Patient demonstrates understanding and agreement with the plan, was given the opportunity to ask questions, and these questions were answered to the best of the provided information at this time. VS stable for transfer. This dictation was prepared using [employer] voice recognition software. Vitals:   Vitals:    04/14/22 1100 04/14/22 1200 04/14/22 1300 04/14/22 1400   BP: (!) 143/35 (!) 110/40 (!) 86/35 (!) 106/57   Pulse:  72 70 64   Resp: 14 17 19 17   Temp:  98.4 °F (36.9 °C)     TempSrc:  Oral     SpO2: 92% (!) 89% 94% 96%   Weight:       Height:             I personally saw and examined the patient.  I have reviewed and agree with the resident's findings, including all diagnostic interpretations and treatment plan as written. I was present for the key portions of any procedures performed and the inclusive time noted for any critical care statement. The care is provided during an unprecedented national emergency due to the novel coronavirus, COVID 19.   [facility],   Attending Emergency Physician            [facility],   04/14/22 6411

## 2022-04-13 NOTE — ED TRIAGE NOTES
Pt came to ED when family called the ambulance. BGL was 567 en route. B/P initally was 220/80 Pt was given 1 nitro en route, B/P 167/70 after nitro en route. 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 R AC 20 G placed, labs drawn, eKG completed. Pt on 3 L Nc at this time.

## 2022-04-13 NOTE — PROGRESS NOTES
Medication History completed:    New medications: melatonin, lidocaine-prilocaine    Medications discontinued: none    Changes to dosing:  Carvedilol changed to 3.125 BID  Insulin glargine changed to 70 units BID  Humalog changed to 15 units TID with a sliding scale    Stated allergies: As listed    Other pertinent information: Medications confirmed with patient and [facility]. Of note, there are several medications that the patient states she takes that have not been refilled recently.      Thank you,   Yang Strong  PharmD Candidate 2022

## 2022-04-13 NOTE — ED PROVIDER NOTES
[facility] ED  Emergency Department Encounter  Emergency Medicine Resident     Pt Name: Cseia Carrera  MRN: [id number]  [place] 1960  Date of evaluation: 4/13/22  PCP:  AFSANEH Aragon CNP    CHIEF COMPLAINT       Chief Complaint   Patient presents with    Shortness of Breath       HISTORY OFPRESENT ILLNESS  (Location/Symptom, Timing/Onset, Context/Setting, Quality, Duration, Modifying Zeenat Pretzel.)      Cesia Carrera is a 57 y.o. female with past medical history of CHF, CKD on hemodialysis Tuesday and Thursday, hypertension, diabetes who presents for complaints of shortness of breath. Patient states shortness of breath has been ongoing for the last 2 to 3 days and has been progressively worsening. She states she did get her full session of dialysis yesterday however this did not help her shortness of breath. She has been having associated chest pains as well as a sensation of nausea but no vomiting with the shortness of breath. Patient does admit to using 3 L nasal cannula chronically at home. Denies fever or chills. No abdominal pain. Patient did receive 2 doses of the COVID-19 vaccination as well as was infected with Covid in January 2022. Per EMS patient did have initial systolic blood pressure in the 220s which improved after 1 tab of nitro. Patient states symptoms did not improve with nitro. Per EMS patient also hyperglycemic with sugar greater than 500, patient does report she is insulin-dependent diabetic however given she was not feeling well today she did not take her afternoon dose of insulin.      PAST MEDICAL / SURGICAL / SOCIAL / FAMILY HISTORY      has a past medical history of Backache, unspecified, CHF (congestive heart failure) ([facility] [address] Chronic kidney disease, Coronary atherosclerosis of artery bypass graft, COVID, Cramp of limb, Gallstones, Hypertension, Insomnia, Pneumonia, Type II or unspecified type diabetes mellitus with renal manifestations, not stated as uncontrolled(250.40), Type II or unspecified type diabetes mellitus without mention of complication, not stated as uncontrolled, and Unspecified vitamin D deficiency. has a past surgical history that includes Coronary artery bypass graft; Knee arthroscopy; Carpal tunnel release; Breast surgery; Tonsillectomy; Hand surgery; Ankle fracture surgery; Cholecystectomy, open (N/A); IR TUNNELED CVC PLACE WO SQ PORT/PUMP > 5 YEARS (8/18/2021); AV fistula creation (12/14/2021); Dialysis fistula creation (Left, 12/14/2021); and other surgical history (04/06/2022). Social History     Socioeconomic History    Marital status: Single     Spouse name: Not on file    Number of children: Not on file    Years of education: Not on file    Highest education level: Not on file   Occupational History    Not on file   Tobacco Use    Smoking status: Never Smoker    Smokeless tobacco: Never Used   Vaping Use    Vaping Use: Never used   Substance and Sexual Activity    Alcohol use: No    Drug use: No    Sexual activity: Not Currently   Other Topics Concern    Not on file   Social History Narrative    Not on file     Social Determinants of Health     Financial Resource Strain: Low Risk     Difficulty of Paying Living Expenses: Not very hard   Food Insecurity: No Food Insecurity    Worried About Running Out of Food in the Last Year: Never true    Nany of Food in the Last Year: Never true   Transportation Needs: No Transportation Needs    Lack of Transportation (Medical): No    Lack of Transportation (Non-Medical): No   Physical Activity: Inactive    Days of Exercise per Week: 0 days    Minutes of Exercise per Session: 0 min   Stress: Stress Concern Present    Feeling of Stress :  To some extent   Social Connections: Socially Isolated    Frequency of Communication with Friends and Family: More than three times a week    Frequency of Social Gatherings with Friends and Family: More than three times a week    Attends Rastafari Services: Never    Active Member of Clubs or Organizations: No    Attends Club or Organization Meetings: Never    Marital Status: Never    Intimate Partner Violence:     Fear of Current or Ex-Partner: Not on file    Emotionally Abused: Not on file    Physically Abused: Not on file    Sexually Abused: Not on file   Housing Stability: [address] Unable to Pay for Housing in the Last Year: No    Number of [place] in the Last Year: 1    Unstable Housing in the Last Year: No       Family History   Problem Relation Age of Onset    Diabetes Father     Heart Failure Father        Allergies:  Adhesive tape, Ace inhibitors, Iv dye [iodides], Nsaids, and Metformin and related    Home Medications:  Prior to Admission medications    Medication Sig Start Date End Date Taking? Authorizing Provider   melatonin 10 MG CAPS capsule Take 10 mg by mouth nightly   Yes Historical Provider, MD   insulin glargine (BASAGLAR KWIKPEN) 100 UNIT/ML injection pen Inject 70 Units into the skin 2 times daily   Yes Historical Provider, MD   lidocaine-prilocaine (EMLA) 2.5-2.5 % cream Apply topically as needed for Pain Indications: Apply to dialysis site Apply topically as needed. Yes Historical Provider, MD   carvedilol (COREG) 3.125 MG tablet Take 3.125 mg by mouth 2 times daily   Yes Historical Provider, MD   traZODone (DESYREL) 50 MG tablet TAKE 1 & 1/2 (ONE & ONE-HALF) TABLETS BY MOUTH NIGHTLY 4/11/22   AFSANEH Henry CNP   gabapentin (NEURONTIN) 300 MG capsule Take 1 capsule by mouth 2 times daily for 30 days.  4/11/22 5/11/22  AFSANEH Henry CNP   acetaminophen (TYLENOL) 325 MG tablet Take 650 mg by mouth every 6 hours as needed for Pain    Historical Provider, MD   furosemide (LASIX) 80 MG tablet Take 1 tablet by mouth 2 times daily 3/8/22   AFSANEH Henry CNP   sodium bicarbonate 650 MG tablet Take 1 tablet by mouth 3 times daily 3/2/22   Luann Batista MD Insulin Pen Needle (EASY TOUCH PEN NEEDLES) 29G X 12MM MISC 1 each by Does not apply route daily 3/1/22   AFSANEH Zhou CNP   ReliOn Lancets Micro-Thin 33G MISC USE AS DIRECTED EVERY DAY 1/28/22   Historical Provider, MD   HUMALOG 100 UNIT/ML injection vial Inject into the skin 3 times daily (before meals) Indications: 15 units and sliding scale  11/29/21   Historical Provider, MD   Blood Glucose Monitoring Suppl (TRUE METRIX GO GLUCOSE METER) w/Device KIT 1 each by Does not apply route 4 times daily 11/30/21   AFSANEH Zhou CNP   Lancets MISC 1 each by Does not apply route daily 11/30/21   AFSANEH Zhou CNP   febuxostat (ULORIC) 40 MG TABS tablet Take 1 tablet by mouth daily 11/22/21   Lita Correia MD   docusate sodium (COLACE) 100 MG capsule Take 1 capsule by mouth 2 times daily 11/22/21   Lita Correia MD   tamsulosin [facility]) 0.4 MG capsule Take 1 capsule by mouth daily 11/23/21   Lita Correia MD   atorvastatin (LIPITOR) 80 MG tablet Take 1 tablet by mouth daily 11/3/21   AFSANEH Zhou CNP   aspirin 81 MG chewable tablet Take 1 tablet by mouth daily 9/25/21   Genevieve Betts MD   ranolazine (RANEXA) 1000 MG extended release tablet Take 1 tablet by mouth 2 times daily 9/25/21   Genevieve Betts MD   busPIRone (BUSPAR) 7.5 MG tablet Take 1 tablet by mouth 3 times daily 9/25/21   Genevieve Betts MD   pantoprazole (PROTONIX) 40 MG tablet Take 1 tablet by mouth every morning (before breakfast) 9/25/21   Genevieve Betts MD   allopurinol (ZYLOPRIM) 100 MG tablet Take 1 tablet by mouth daily 4/14/21   AFSANEH Zhou CNP       REVIEW OF SYSTEMS    (2-9 systems for level 4, 10 or more for level 5)      Review of Systems   Constitutional: Positive for fatigue. Negative for fever. HENT: Negative for congestion. Respiratory: Positive for shortness of breath. Negative for cough. Cardiovascular: Positive for chest pain. Gastrointestinal: Positive for nausea.  Negative for abdominal pain and vomiting. Musculoskeletal: Negative for myalgias. Skin: Negative for color change and rash. Neurological: Negative for weakness, numbness and headaches. Psychiatric/Behavioral: Negative for confusion. PHYSICAL EXAM   (up to 7 for level 4, 8 or more for level 5)     INITIAL VITALS:    height is 5' 5\" (1.651 m) and weight is 248 lb (112.5 kg). Her temperature is 98.6 °F (37 °C). Her blood pressure is 156/58 (abnormal) and her pulse is 66. Her respiration is 14 and oxygen saturation is 97%. Physical Exam  Constitutional:       Comments: Alert and oriented to person, place, time. Does appear to be uncomfortable in no respiratory distress, nontoxic in appearance   HENT:      Mouth/Throat:      Mouth: Mucous membranes are moist.   Eyes:      Pupils: Pupils are equal, round, and reactive to light. Cardiovascular:      Rate and Rhythm: Normal rate and regular rhythm. Heart sounds: No murmur heard. Pulmonary:      Comments: Patient is nontachypneic, saturating well on home O2 3 L at %, lungs with bilateral rales, speaking in full sentences, no respiratory distress  Chest:      Chest wall: No tenderness. Abdominal:      Palpations: Abdomen is soft. Tenderness: There is no abdominal tenderness. There is no rebound. Musculoskeletal:      Right lower leg: No edema. Left lower leg: No edema. Skin:     General: Skin is warm and dry. Capillary Refill: Capillary refill takes less than 2 seconds.          DIFFERENTIAL  DIAGNOSIS     PLAN (LABS / IMAGING / EKG):  Orders Placed This Encounter   Procedures    COVID-19 & Influenza Combo    Culture, Urine    XR CHEST PORTABLE    CBC with Auto Differential    Blood Gas, Venous    Beta-Hydroxybutyrate    Basic Metabolic Panel    Brain Natriuretic Peptide    Magnesium    Troponin    SPECIMEN REJECTION    Urinalysis with Reflex to Culture    Microscopic Urinalysis    Basic Metabolic Panel    ADULT DIET; Regular; Low Sodium (2 gm); 1500 ml    HYPOGLYCEMIA TREATMENT: blood glucose less than 50 mg/dL and patient  ALERT and TOLERATING PO    HYPOGLYCEMIA TREATMENT: blood glucose less than 70 mg/dL and patient ALERT and TOLERATING PO    HYPOGLYCEMIA TREATMENT: blood glucose less than 70 mg/dL and patient NOT ALERT or NPO    Full Code    Inpatient consult to Internal Medicine    Inpatient consult to Nephrology    Inpatient consult to Cardiology    Inpatient consult to Pulmonology    POC Glucose Fingerstick    POCT Glucose    POCT Glucose    POC Glucose Fingerstick    POC Glucose Fingerstick    POC Glucose Fingerstick    EKG 12 Lead    ADMIT TO INPATIENT       MEDICATIONS ORDERED:  Orders Placed This Encounter   Medications    ondansetron (ZOFRAN) injection 4 mg    potassium bicarb-citric acid (EFFER-K) effervescent tablet 40 mEq    glucose (GLUTOSE) 40 % oral gel 15 g    dextrose 50 % IV solution    glucagon (rDNA) injection 1 mg    dextrose 5 % solution    insulin regular (HUMULIN R;NOVOLIN R) 100 Units in sodium chloride 0.9 % 100 mL infusion    potassium chloride 40 mEq in sodium chloride 0.9 % 1,000 mL infusion    miconazole (MICOTIN) 2 % powder    acetaminophen (TYLENOL) tablet 650 mg    cefTRIAXone (ROCEPHIN) 1000 mg IVPB in 50 mL D5W minibag     Order Specific Question:   Antimicrobial Indications     Answer:   Urinary Tract Infection    furosemide (LASIX) injection 60 mg       DDX: ACS, NSTEMI, CHF, PE, Covid, influenza, fluid overload status, need for dialysis, hyperglycemia, DKA    Initial MDM/Plan: 57 y.o. female who presents with 2-3 days of shortness of breath. History of Tuesday and Thursday dialysis, did receive full session yesterday however no alleviation in symptoms. Of note patient has had recent negative pulmonary embolism work-up within the last 10 days. Patient seen and examined, she does appear uncomfortable however no acute respiratory chest, speaking full sentences. Oxygen saturation % on patient's home 3 L of oxygen. Able to speak in full sentences. Nontachypneic however lungs do have rales bilaterally. Abdomen soft, nontender to palpation. Normal heart sounds. No bilateral lower extremity edema. Will perform cardiac evaluation, lower suspicion for PE as cause of patient's symptoms given rales as well as recent negative VQ scan. Given patient hyper glycemic for EMS we will also add on DKA labs.     DIAGNOSTIC RESULTS / EMERGENCY DEPARTMENT COURSE / MDM     LABS:  Labs Reviewed   CBC WITH AUTO DIFFERENTIAL - Abnormal; Notable for the following components:       Result Value    RBC 3.79 (*)     Hemoglobin 11.8 (*)     Hematocrit 35.2 (*)     RDW 15.4 (*)     Seg Neutrophils 76 (*)     Lymphocytes 15 (*)     Absolute Lymph # 0.90 (*)     All other components within normal limits   BLOOD GAS, VENOUS - Abnormal; Notable for the following components:    pH, Felix 7.449 (*)     pCO2, Felix 27.4 (*)     HCO3, Venous 19.0 (*)     Negative Base Excess, Felix 5.0 (*)     All other components within normal limits   BETA-HYDROXYBUTYRATE - Abnormal; Notable for the following components:    Beta-Hydroxybutyrate 7.57 (*)     All other components within normal limits   BASIC METABOLIC PANEL - Abnormal; Notable for the following components:    Glucose 575 (*)     CREATININE 1.78 (*)     Potassium 3.3 (*)     Chloride 94 (*)     CO2 18 (*)     Anion Gap 25 (*)     GFR Non- 29 (*)     GFR  35 (*)     All other components within normal limits   BRAIN NATRIURETIC PEPTIDE - Abnormal; Notable for the following components:    Pro-BNP 2,852 (*)     All other components within normal limits   TROPONIN - Abnormal; Notable for the following components:    Troponin, High Sensitivity 78 (*)     All other components within normal limits   URINALYSIS WITH REFLEX TO CULTURE - Abnormal; Notable for the following components:    Turbidity UA Cloudy (*)     Glucose, Ur LARGE (*)     Ketones, Urine 4+ (*)     Urine Hgb MOD (*)     Protein, UA 3+ (*)     Leukocyte Esterase, Urine SMALL (*)     All other components within normal limits   MICROSCOPIC URINALYSIS - Abnormal; Notable for the following components:    Bacteria, UA MANY (*)     Yeast, UA MANY (*)     All other components within normal limits   POC GLUCOSE FINGERSTICK - Abnormal; Notable for the following components:    POC Glucose 521 (*)     All other components within normal limits   POC GLUCOSE FINGERSTICK - Abnormal; Notable for the following components:    POC Glucose 332 (*)     All other components within normal limits   POC GLUCOSE FINGERSTICK - Abnormal; Notable for the following components:    POC Glucose 244 (*)     All other components within normal limits   POC GLUCOSE FINGERSTICK - Abnormal; Notable for the following components:    POC Glucose 219 (*)     All other components within normal limits   POCT GLUCOSE - Normal   POCT GLUCOSE - Normal   COVID-19 & INFLUENZA COMBO   CULTURE, URINE   MAGNESIUM   SPECIMEN REJECTION   BASIC METABOLIC PANEL   POCT GLUCOSE   POCT GLUCOSE   POCT GLUCOSE   POCT GLUCOSE   POCT GLUCOSE   POCT GLUCOSE   POCT GLUCOSE   POCT GLUCOSE   POCT GLUCOSE   POCT GLUCOSE   POCT GLUCOSE   POCT GLUCOSE   POCT GLUCOSE   POCT GLUCOSE   POCT GLUCOSE   POCT GLUCOSE   POCT GLUCOSE         RADIOLOGY:  XR CHEST PORTABLE    Result Date: 4/13/2022  EXAMINATION: ONE XRAY VIEW OF THE CHEST 4/13/2022 3:46 pm COMPARISON: April 2022 HISTORY: ORDERING SYSTEM PROVIDED HISTORY: SOB TECHNOLOGIST PROVIDED HISTORY: SOB Reason for Exam: SOB FINDINGS: Stable position of the right internal jugular dialysis catheter. Stable cardiac silhouette enlargement and central pulmonary venous congestion versus fluid overload. No convincing lung consolidation or infiltrate. Trace left pleural effusion is unchanged. No pneumothorax. Central pulmonary venous congestion 1st is fluid overload in a patient with no known ESRD. EKG    EKG Interpretation    Interpreted by me    Rhythm: normal sinus with arrhythmia  Rate: normal  Axis: normal  Ectopy: none  Conduction: normal  ST Segments: no acute change  T Waves: no acute change  Q Waves: none    Clinical Impression: Normal sinus rhythm with sinus arrhythmia, normal axis,  otherwise normal intervals, nonspecific ST and T changes V5 and V6    All EKG's are interpreted by the Emergency Department Physician who either signs or Co-signs this chart in the absence of a cardiologist.    EMERGENCY DEPARTMENT COURSE:  ED Course as of 04/13/22 2353 Wed Apr 13, 2022   1716 Chest x-ray does show effusion and pulmonary vascular congestion [DS]   0941 Troponin elevated however in line with patient's baseline [DS]   7352 Blood sugar is significantly elevated as well as patient does have decreased bicarbonate and elevated anion gap however no acidosis and pH, ketones pending. [DS]   6486 Ketones appear significantly elevated at 7.57. [DS]   9199 Patient updated on plan of care, okay with admission. Given ketosis, hyperglycemia and elevated anion gap will treat for DKA with insulin drip patient does not have acidotic pH on VBG however does have ketosis, hyperglycemia as well as elevated anion gap and decreased bicarb concerning for progression to DKA. We will also start on IV fluids with potassium added. Patient was given 40 effer K. [DS]      ED Course User Index  [DS] Prosper Lloyd DO     Spoke with admitting service who agrees to admit patient. Admitting service did ask for nephrology, pulmonology and cardiology consults which were placed at the emergency department. Cardiology stated they would see the patient in inpatient basis. Pulmonology recommended 1 g of Rocephin to cover for urinary tract infection which was ordered however patient does not have any vital signs consistent with sepsis and no need for sepsis work-up at this time.   Nephrology was paged, given patient has no vital sign abnormalities she can be dialyzed tomorrow on a nonemergent basis. Patient and member updated on plan of care and okay with plan of admission for DKA as well as worsening shortness of breath. Patient admitted to medicine. PROCEDURES:  None    CONSULTS:  IP CONSULT TO INTERNAL MEDICINE  IP CONSULT TO NEPHROLOGY  IP CONSULT TO CARDIOLOGY  IP CONSULT TO PULMONOLOGY    CRITICAL CARE:  Please see attending note    FINAL IMPRESSION      1. Diabetic ketoacidosis without coma associated with type 2 diabetes mellitus ([facility] [address]    2. Dyspnea, unspecified type          DISPOSITION / PLAN     DISPOSITION Admitted 04/13/2022 07:53:12 PM        PATIENTREFERRED TO:  No follow-up provider specified.     DISCHARGE MEDICATIONS:  New Prescriptions    No medications on file       Zoltan Booth DO  EmergencyMedicine Resident    (Please note that portions of this note were completed with a voice recognition program.  Efforts were made to edit the dictations but occasionally words are mis-transcribed.)        Zoltan Booth DO  Resident  04/13/22 [id number]

## 2022-04-13 NOTE — CARE COORDINATION
Patient called [facility] stating she is \"really not feeling well and is having trouble breathing\". She verified that her mother is with her but is not sure what to do. [facility] attempted to get patient to calm down and explain what her symptoms are. Patient was audibly SOB and in a panic. [facility] was unable to get any details regarding blood sugar, possible new medications or went led up to this event. [facility] offered to call 911 and send a squad to check on her. She asked that I call her sister 1st.   [facility] immediately called Hailey her sister who stated she would call the patient right now and then call a squad if needed. Hailey called [facility] back stating that the EMS was called and that they took her to the hospital for evaluation. She reported that Estefany's blood sugar was very high and that she went to the restroom without her O2 so she was SOB. [facility] will follow up with patient when she is home.

## 2022-04-14 ENCOUNTER — APPOINTMENT (OUTPATIENT)
Dept: NON INVASIVE DIAGNOSTICS | Age: 62
DRG: 637 | End: 2022-04-14
Payer: COMMERCIAL

## 2022-04-14 PROBLEM — E87.6 HYPOKALEMIA: Status: ACTIVE | Noted: 2022-04-14

## 2022-04-14 LAB
ANION GAP SERPL CALCULATED.3IONS-SCNC: 11 MMOL/L (ref 9–17)
ANION GAP SERPL CALCULATED.3IONS-SCNC: 12 MMOL/L (ref 9–17)
ANION GAP SERPL CALCULATED.3IONS-SCNC: 16 MMOL/L (ref 9–17)
BETA-HYDROXYBUTYRATE: 0.87 MMOL/L (ref 0.02–0.27)
BUN BLDV-MCNC: 16 MG/DL (ref 8–23)
CALCIUM SERPL-MCNC: 8.1 MG/DL (ref 8.6–10.4)
CALCIUM SERPL-MCNC: 8.2 MG/DL (ref 8.6–10.4)
CALCIUM SERPL-MCNC: 8.9 MG/DL (ref 8.6–10.4)
CHLORIDE BLD-SCNC: 100 MMOL/L (ref 98–107)
CHLORIDE BLD-SCNC: 102 MMOL/L (ref 98–107)
CHLORIDE BLD-SCNC: 98 MMOL/L (ref 98–107)
CO2: 23 MMOL/L (ref 20–31)
CO2: 25 MMOL/L (ref 20–31)
CO2: 26 MMOL/L (ref 20–31)
CREAT SERPL-MCNC: 1.59 MG/DL (ref 0.5–0.9)
CREAT SERPL-MCNC: 1.68 MG/DL (ref 0.5–0.9)
CREAT SERPL-MCNC: 1.79 MG/DL (ref 0.5–0.9)
CULTURE: NORMAL
EKG ATRIAL RATE: 82 BPM
EKG P AXIS: 67 DEGREES
EKG P-R INTERVAL: 142 MS
EKG Q-T INTERVAL: 404 MS
EKG QRS DURATION: 100 MS
EKG QTC CALCULATION (BAZETT): 472 MS
EKG R AXIS: 85 DEGREES
EKG T AXIS: -41 DEGREES
EKG VENTRICULAR RATE: 82 BPM
GFR AFRICAN AMERICAN: 35 ML/MIN
GFR AFRICAN AMERICAN: 37 ML/MIN
GFR AFRICAN AMERICAN: 40 ML/MIN
GFR NON-AFRICAN AMERICAN: 29 ML/MIN
GFR NON-AFRICAN AMERICAN: 31 ML/MIN
GFR NON-AFRICAN AMERICAN: 33 ML/MIN
GFR SERPL CREATININE-BSD FRML MDRD: ABNORMAL ML/MIN/{1.73_M2}
GLUCOSE BLD-MCNC: 131 MG/DL (ref 65–105)
GLUCOSE BLD-MCNC: 133 MG/DL (ref 65–105)
GLUCOSE BLD-MCNC: 148 MG/DL (ref 65–105)
GLUCOSE BLD-MCNC: 168 MG/DL (ref 70–99)
GLUCOSE BLD-MCNC: 170 MG/DL
GLUCOSE BLD-MCNC: 170 MG/DL (ref 65–105)
GLUCOSE BLD-MCNC: 180 MG/DL (ref 65–105)
GLUCOSE BLD-MCNC: 184 MG/DL (ref 65–105)
GLUCOSE BLD-MCNC: 187 MG/DL (ref 65–105)
GLUCOSE BLD-MCNC: 191 MG/DL (ref 65–105)
GLUCOSE BLD-MCNC: 192 MG/DL (ref 65–105)
GLUCOSE BLD-MCNC: 193 MG/DL (ref 65–105)
GLUCOSE BLD-MCNC: 201 MG/DL (ref 70–99)
GLUCOSE BLD-MCNC: 203 MG/DL (ref 65–105)
GLUCOSE BLD-MCNC: 205 MG/DL
GLUCOSE BLD-MCNC: 205 MG/DL (ref 65–105)
GLUCOSE BLD-MCNC: 237 MG/DL (ref 65–105)
GLUCOSE BLD-MCNC: 262 MG/DL (ref 65–105)
GLUCOSE BLD-MCNC: 274 MG/DL (ref 65–105)
GLUCOSE BLD-MCNC: 314 MG/DL (ref 65–105)
GLUCOSE BLD-MCNC: 352 MG/DL (ref 70–99)
LV EF: 55 %
LVEF MODALITY: NORMAL
MAGNESIUM: 1.9 MG/DL (ref 1.6–2.6)
MAGNESIUM: 2 MG/DL (ref 1.6–2.6)
MAGNESIUM: 2.1 MG/DL (ref 1.6–2.6)
PHOSPHORUS: 2.1 MG/DL (ref 2.6–4.5)
PHOSPHORUS: 2.2 MG/DL (ref 2.6–4.5)
PHOSPHORUS: 4.9 MG/DL (ref 2.6–4.5)
POTASSIUM SERPL-SCNC: 2.7 MMOL/L (ref 3.7–5.3)
POTASSIUM SERPL-SCNC: 3.2 MMOL/L (ref 3.7–5.3)
POTASSIUM SERPL-SCNC: 3.5 MMOL/L (ref 3.7–5.3)
REASON FOR REJECTION: NORMAL
REASON FOR REJECTION: NORMAL
SODIUM BLD-SCNC: 135 MMOL/L (ref 135–144)
SODIUM BLD-SCNC: 137 MMOL/L (ref 135–144)
SODIUM BLD-SCNC: 141 MMOL/L (ref 135–144)
SPECIMEN DESCRIPTION: NORMAL
ZZ NTE CLEAN UP: ORDERED TEST: NORMAL
ZZ NTE CLEAN UP: ORDERED TEST: NORMAL
ZZ NTE WITH NAME CLEAN UP: SPECIMEN SOURCE: NORMAL
ZZ NTE WITH NAME CLEAN UP: SPECIMEN SOURCE: NORMAL

## 2022-04-14 PROCEDURE — 2060000000 HC ICU INTERMEDIATE R&B

## 2022-04-14 PROCEDURE — 6360000002 HC RX W HCPCS: Performed by: STUDENT IN AN ORGANIZED HEALTH CARE EDUCATION/TRAINING PROGRAM

## 2022-04-14 PROCEDURE — 90935 HEMODIALYSIS ONE EVALUATION: CPT

## 2022-04-14 PROCEDURE — 6360000002 HC RX W HCPCS: Performed by: FAMILY MEDICINE

## 2022-04-14 PROCEDURE — 2580000003 HC RX 258: Performed by: INTERNAL MEDICINE

## 2022-04-14 PROCEDURE — 2580000003 HC RX 258: Performed by: STUDENT IN AN ORGANIZED HEALTH CARE EDUCATION/TRAINING PROGRAM

## 2022-04-14 PROCEDURE — 82010 KETONE BODYS QUAN: CPT

## 2022-04-14 PROCEDURE — 6370000000 HC RX 637 (ALT 250 FOR IP): Performed by: INTERNAL MEDICINE

## 2022-04-14 PROCEDURE — 6360000002 HC RX W HCPCS: Performed by: INTERNAL MEDICINE

## 2022-04-14 PROCEDURE — 5A1D70Z PERFORMANCE OF URINARY FILTRATION, INTERMITTENT, LESS THAN 6 HOURS PER DAY: ICD-10-PCS | Performed by: FAMILY MEDICINE

## 2022-04-14 PROCEDURE — 36415 COLL VENOUS BLD VENIPUNCTURE: CPT

## 2022-04-14 PROCEDURE — 83735 ASSAY OF MAGNESIUM: CPT

## 2022-04-14 PROCEDURE — 80048 BASIC METABOLIC PNL TOTAL CA: CPT

## 2022-04-14 PROCEDURE — 99223 1ST HOSP IP/OBS HIGH 75: CPT | Performed by: FAMILY MEDICINE

## 2022-04-14 PROCEDURE — 6370000000 HC RX 637 (ALT 250 FOR IP): Performed by: FAMILY MEDICINE

## 2022-04-14 PROCEDURE — 93306 TTE W/DOPPLER COMPLETE: CPT

## 2022-04-14 PROCEDURE — 2500000003 HC RX 250 WO HCPCS: Performed by: INTERNAL MEDICINE

## 2022-04-14 PROCEDURE — 2580000003 HC RX 258: Performed by: FAMILY MEDICINE

## 2022-04-14 PROCEDURE — 84100 ASSAY OF PHOSPHORUS: CPT

## 2022-04-14 PROCEDURE — 82947 ASSAY GLUCOSE BLOOD QUANT: CPT

## 2022-04-14 RX ORDER — MAGNESIUM SULFATE 1 G/100ML
1000 INJECTION INTRAVENOUS PRN
Status: DISCONTINUED | OUTPATIENT
Start: 2022-04-14 | End: 2022-04-14

## 2022-04-14 RX ORDER — FUROSEMIDE 40 MG/1
80 TABLET ORAL 2 TIMES DAILY
Status: DISCONTINUED | OUTPATIENT
Start: 2022-04-14 | End: 2022-04-19

## 2022-04-14 RX ORDER — SODIUM CHLORIDE 9 MG/ML
INJECTION, SOLUTION INTRAVENOUS PRN
Status: DISCONTINUED | OUTPATIENT
Start: 2022-04-14 | End: 2022-04-28 | Stop reason: HOSPADM

## 2022-04-14 RX ORDER — FLUCONAZOLE 2 MG/ML
200 INJECTION, SOLUTION INTRAVENOUS EVERY 24 HOURS
Status: DISCONTINUED | OUTPATIENT
Start: 2022-04-14 | End: 2022-04-20

## 2022-04-14 RX ORDER — SODIUM CHLORIDE 0.9 % (FLUSH) 0.9 %
5-40 SYRINGE (ML) INJECTION PRN
Status: DISCONTINUED | OUTPATIENT
Start: 2022-04-14 | End: 2022-04-28 | Stop reason: HOSPADM

## 2022-04-14 RX ORDER — SODIUM BICARBONATE 650 MG/1
650 TABLET ORAL 3 TIMES DAILY
Status: DISCONTINUED | OUTPATIENT
Start: 2022-04-14 | End: 2022-04-28 | Stop reason: HOSPADM

## 2022-04-14 RX ORDER — DOCUSATE SODIUM 100 MG/1
100 CAPSULE, LIQUID FILLED ORAL 2 TIMES DAILY
Status: DISCONTINUED | OUTPATIENT
Start: 2022-04-14 | End: 2022-04-28 | Stop reason: HOSPADM

## 2022-04-14 RX ORDER — POTASSIUM CHLORIDE 20 MEQ/1
40 TABLET, EXTENDED RELEASE ORAL ONCE
Status: COMPLETED | OUTPATIENT
Start: 2022-04-14 | End: 2022-04-14

## 2022-04-14 RX ORDER — ALBUMIN (HUMAN) 12.5 G/50ML
25 SOLUTION INTRAVENOUS
Status: ACTIVE | OUTPATIENT
Start: 2022-04-14 | End: 2022-04-15

## 2022-04-14 RX ORDER — ATORVASTATIN CALCIUM 80 MG/1
80 TABLET, FILM COATED ORAL DAILY
Status: DISCONTINUED | OUTPATIENT
Start: 2022-04-14 | End: 2022-04-28 | Stop reason: HOSPADM

## 2022-04-14 RX ORDER — LIDOCAINE AND PRILOCAINE 25; 25 MG/G; MG/G
CREAM TOPICAL PRN
Status: DISCONTINUED | OUTPATIENT
Start: 2022-04-14 | End: 2022-04-28 | Stop reason: HOSPADM

## 2022-04-14 RX ORDER — TRAZODONE HYDROCHLORIDE 50 MG/1
50 TABLET ORAL NIGHTLY
Status: DISCONTINUED | OUTPATIENT
Start: 2022-04-14 | End: 2022-04-26

## 2022-04-14 RX ORDER — PANTOPRAZOLE SODIUM 40 MG/1
40 TABLET, DELAYED RELEASE ORAL
Status: DISCONTINUED | OUTPATIENT
Start: 2022-04-14 | End: 2022-04-28 | Stop reason: HOSPADM

## 2022-04-14 RX ORDER — RANOLAZINE 1000 MG/1
1000 TABLET, EXTENDED RELEASE ORAL 2 TIMES DAILY
Status: DISCONTINUED | OUTPATIENT
Start: 2022-04-14 | End: 2022-04-28 | Stop reason: HOSPADM

## 2022-04-14 RX ORDER — ONDANSETRON 2 MG/ML
4 INJECTION INTRAMUSCULAR; INTRAVENOUS EVERY 6 HOURS PRN
Status: DISCONTINUED | OUTPATIENT
Start: 2022-04-14 | End: 2022-04-28 | Stop reason: HOSPADM

## 2022-04-14 RX ORDER — TAMSULOSIN HYDROCHLORIDE 0.4 MG/1
0.4 CAPSULE ORAL DAILY
Status: DISCONTINUED | OUTPATIENT
Start: 2022-04-14 | End: 2022-04-28 | Stop reason: HOSPADM

## 2022-04-14 RX ORDER — HEPARIN SODIUM 5000 [USP'U]/ML
5000 INJECTION, SOLUTION INTRAVENOUS; SUBCUTANEOUS EVERY 8 HOURS SCHEDULED
Status: DISPENSED | OUTPATIENT
Start: 2022-04-14 | End: 2022-04-22

## 2022-04-14 RX ORDER — INSULIN GLARGINE 100 [IU]/ML
70 INJECTION, SOLUTION SUBCUTANEOUS 2 TIMES DAILY
Status: DISCONTINUED | OUTPATIENT
Start: 2022-04-14 | End: 2022-04-14

## 2022-04-14 RX ORDER — SODIUM CHLORIDE 0.9 % (FLUSH) 0.9 %
5-40 SYRINGE (ML) INJECTION EVERY 12 HOURS SCHEDULED
Status: DISCONTINUED | OUTPATIENT
Start: 2022-04-14 | End: 2022-04-28 | Stop reason: HOSPADM

## 2022-04-14 RX ORDER — INSULIN GLARGINE 100 [IU]/ML
50 INJECTION, SOLUTION SUBCUTANEOUS 2 TIMES DAILY
Status: DISCONTINUED | OUTPATIENT
Start: 2022-04-14 | End: 2022-04-15

## 2022-04-14 RX ORDER — GABAPENTIN 300 MG/1
300 CAPSULE ORAL 2 TIMES DAILY
Status: DISCONTINUED | OUTPATIENT
Start: 2022-04-14 | End: 2022-04-28

## 2022-04-14 RX ORDER — ONDANSETRON 4 MG/1
4 TABLET, ORALLY DISINTEGRATING ORAL EVERY 8 HOURS PRN
Status: DISCONTINUED | OUTPATIENT
Start: 2022-04-14 | End: 2022-04-28 | Stop reason: HOSPADM

## 2022-04-14 RX ORDER — LANOLIN ALCOHOL/MO/W.PET/CERES
9 CREAM (GRAM) TOPICAL NIGHTLY
Status: DISCONTINUED | OUTPATIENT
Start: 2022-04-14 | End: 2022-04-28 | Stop reason: HOSPADM

## 2022-04-14 RX ORDER — POTASSIUM CHLORIDE 7.45 MG/ML
10 INJECTION INTRAVENOUS PRN
Status: DISCONTINUED | OUTPATIENT
Start: 2022-04-14 | End: 2022-04-14

## 2022-04-14 RX ORDER — ACETAMINOPHEN 325 MG/1
650 TABLET ORAL EVERY 6 HOURS PRN
Status: DISCONTINUED | OUTPATIENT
Start: 2022-04-14 | End: 2022-04-15 | Stop reason: SDUPTHER

## 2022-04-14 RX ORDER — FEBUXOSTAT 40 MG/1
40 TABLET, FILM COATED ORAL DAILY
Status: DISCONTINUED | OUTPATIENT
Start: 2022-04-14 | End: 2022-04-28 | Stop reason: HOSPADM

## 2022-04-14 RX ORDER — BUSPIRONE HYDROCHLORIDE 5 MG/1
7.5 TABLET ORAL 3 TIMES DAILY
Status: DISCONTINUED | OUTPATIENT
Start: 2022-04-14 | End: 2022-04-28 | Stop reason: HOSPADM

## 2022-04-14 RX ORDER — ASPIRIN 81 MG/1
81 TABLET, CHEWABLE ORAL DAILY
Status: DISCONTINUED | OUTPATIENT
Start: 2022-04-14 | End: 2022-04-18

## 2022-04-14 RX ORDER — CARVEDILOL 3.12 MG/1
3.12 TABLET ORAL 2 TIMES DAILY
Status: DISCONTINUED | OUTPATIENT
Start: 2022-04-14 | End: 2022-04-28 | Stop reason: HOSPADM

## 2022-04-14 RX ADMIN — TAMSULOSIN HYDROCHLORIDE 0.4 MG: 0.4 CAPSULE ORAL at 09:15

## 2022-04-14 RX ADMIN — ATORVASTATIN CALCIUM 80 MG: 80 TABLET, FILM COATED ORAL at 09:15

## 2022-04-14 RX ADMIN — FLUCONAZOLE 200 MG: 200 INJECTION, SOLUTION INTRAVENOUS at 14:34

## 2022-04-14 RX ADMIN — FEBUXOSTAT 40 MG: 40 TABLET, FILM COATED ORAL at 09:00

## 2022-04-14 RX ADMIN — INSULIN GLARGINE 50 UNITS: 100 INJECTION, SOLUTION SUBCUTANEOUS at 19:20

## 2022-04-14 RX ADMIN — DEXTROSE MONOHYDRATE 100 ML/HR: 50 INJECTION, SOLUTION INTRAVENOUS at 04:13

## 2022-04-14 RX ADMIN — Medication 1.6 ML: at 19:52

## 2022-04-14 RX ADMIN — POTASSIUM CHLORIDE 10 MEQ: 7.46 INJECTION, SOLUTION INTRAVENOUS at 15:51

## 2022-04-14 RX ADMIN — SODIUM BICARBONATE 650 MG: 650 TABLET ORAL at 09:15

## 2022-04-14 RX ADMIN — POTASSIUM CHLORIDE 10 MEQ: 7.46 INJECTION, SOLUTION INTRAVENOUS at 14:30

## 2022-04-14 RX ADMIN — INSULIN LISPRO 3 UNITS: 100 INJECTION, SOLUTION INTRAVENOUS; SUBCUTANEOUS at 22:31

## 2022-04-14 RX ADMIN — POTASSIUM CHLORIDE 10 MEQ: 7.46 INJECTION, SOLUTION INTRAVENOUS at 12:03

## 2022-04-14 RX ADMIN — ENOXAPARIN SODIUM 30 MG: 30 INJECTION SUBCUTANEOUS at 09:17

## 2022-04-14 RX ADMIN — SODIUM PHOSPHATE, MONOBASIC, MONOHYDRATE AND SODIUM PHOSPHATE, DIBASIC, ANHYDROUS 18 MMOL: 276; 142 INJECTION, SOLUTION INTRAVENOUS at 14:44

## 2022-04-14 RX ADMIN — ANTI-FUNGAL POWDER MICONAZOLE NITRATE TALC FREE: 1.42 POWDER TOPICAL at 08:00

## 2022-04-14 RX ADMIN — SODIUM CHLORIDE 10.1 UNITS/HR: 9 INJECTION, SOLUTION INTRAVENOUS at 12:06

## 2022-04-14 RX ADMIN — FUROSEMIDE 80 MG: 40 TABLET ORAL at 09:00

## 2022-04-14 RX ADMIN — SODIUM CHLORIDE, PRESERVATIVE FREE 10 ML: 5 INJECTION INTRAVENOUS at 22:34

## 2022-04-14 RX ADMIN — SODIUM CHLORIDE, PRESERVATIVE FREE 10 ML: 5 INJECTION INTRAVENOUS at 09:19

## 2022-04-14 RX ADMIN — RANOLAZINE 1000 MG: 1000 TABLET, FILM COATED, EXTENDED RELEASE ORAL at 22:34

## 2022-04-14 RX ADMIN — SODIUM BICARBONATE 650 MG: 650 TABLET ORAL at 22:26

## 2022-04-14 RX ADMIN — CARVEDILOL 3.12 MG: 3.12 TABLET, FILM COATED ORAL at 09:16

## 2022-04-14 RX ADMIN — BUSPIRONE HYDROCHLORIDE 7.5 MG: 5 TABLET ORAL at 09:15

## 2022-04-14 RX ADMIN — DOCUSATE SODIUM 100 MG: 100 CAPSULE, LIQUID FILLED ORAL at 09:15

## 2022-04-14 RX ADMIN — ASPIRIN 81 MG: 81 TABLET, CHEWABLE ORAL at 09:15

## 2022-04-14 RX ADMIN — DOCUSATE SODIUM 100 MG: 100 CAPSULE, LIQUID FILLED ORAL at 22:26

## 2022-04-14 RX ADMIN — GABAPENTIN 300 MG: 300 CAPSULE ORAL at 09:15

## 2022-04-14 RX ADMIN — CEFTRIAXONE SODIUM 2000 MG: 2 INJECTION, POWDER, FOR SOLUTION INTRAMUSCULAR; INTRAVENOUS at 22:31

## 2022-04-14 RX ADMIN — POTASSIUM CHLORIDE 40 MEQ: 1500 TABLET, EXTENDED RELEASE ORAL at 22:26

## 2022-04-14 RX ADMIN — POTASSIUM CHLORIDE 10 MEQ: 7.46 INJECTION, SOLUTION INTRAVENOUS at 13:23

## 2022-04-14 RX ADMIN — DEXTROSE MONOHYDRATE 100 ML/HR: 50 INJECTION, SOLUTION INTRAVENOUS at 02:08

## 2022-04-14 RX ADMIN — CARVEDILOL 3.12 MG: 3.12 TABLET, FILM COATED ORAL at 22:25

## 2022-04-14 RX ADMIN — PANTOPRAZOLE SODIUM 40 MG: 40 TABLET, DELAYED RELEASE ORAL at 07:00

## 2022-04-14 RX ADMIN — RANOLAZINE 1000 MG: 1000 TABLET, FILM COATED, EXTENDED RELEASE ORAL at 09:17

## 2022-04-14 RX ADMIN — BUSPIRONE HYDROCHLORIDE 7.5 MG: 5 TABLET ORAL at 22:26

## 2022-04-14 RX ADMIN — FUROSEMIDE 60 MG: 10 INJECTION, SOLUTION INTRAMUSCULAR; INTRAVENOUS at 00:58

## 2022-04-14 RX ADMIN — ANTI-FUNGAL POWDER MICONAZOLE NITRATE TALC FREE: 1.42 POWDER TOPICAL at 22:34

## 2022-04-14 RX ADMIN — HEPARIN SODIUM 5000 UNITS: 5000 INJECTION INTRAVENOUS; SUBCUTANEOUS at 22:32

## 2022-04-14 ASSESSMENT — PAIN SCALES - GENERAL
PAINLEVEL_OUTOF10: 0

## 2022-04-14 NOTE — CONSULTS
[facility]   CONSULT NOTE:      DATE OF CONSULT 4/14/2022    REASON FOR CONSULTATION:  Critical care management, DKA      PCP AFSANEH Duong CNP     CHIEF COMPLAINT: Dyspnea    HISTORY OF PRESENT ILLNESS:     Ms. Jorge Schilder is a 68-year-old morbidly obese female with a back in September of this past year. At that time, she had right-sided numbness and loss consciousness with confusion. She was noted to be hypoxic especially ambulating without oxygen. Did prescribe her oxygen at nighttime and with exertion. She was supposed to follow-up with us in the office for work-up of her pulmonary status, but never did. In the interim, she had pulmonary function test done on March 22 of this year which showed an FVC and FEV1 of 50 and 54% the ratio was normal there was no response to bronchodilators. Her diffusing capacity was severely decreased even when corrected for alveolar ventilation was only 59% of predicted. Her lung volumes showed a mild to moderate restrictive pattern with an total lung capacity of 64% of predicted. She was supposed to have an outpatient sleep study but because she never followed up, this was not ordered. She is a lifelong non-smoker and has no history of any underlying documented asthma. She was originally seen by my partner Dr. Linda Rausch in 2017 and had mild restrictive lung disease. However she had a high-resolution CT of the chest done in 2015 which showed no evidence of any pulmonary fibrosis. Subsequently she did not follow-up. She presented to the ER with about 2 to 3 days increasing dyspnea especially with exertion. She did not feel that her dialysis helped her. EMS was summoned to her house and her systolic blood pressure was in the 220s. Patient also was hyperglycemic with a sugar greater than 500. According to the patient yesterday should not take her afternoon dose of insulin.   When she came into the emergency room, her beta hydroxybutyrate was 7.57 and she had an anion gap acidosis of 25. She was started on IV fluids and an insulin drip also. A venous blood gas showed a pH of 745 with a PCO2 of 27.her initial blood sugar was 175. Her UA was also positive for leukocyte esterase and had both yeast and bacteria. I was told that she had bacteria in her urine and I did order Rocephin but I did not know about the yeast.    At that time, she has improved with her anion gap now at 12    Of note, she had a perfusion scan on April 6 that was low probability for pulmonary embolism. For PE but she has not had any high resolution CT of the chest.  Echo done in September of last year showed a normal EF of 55%. Despite being vaccinated she had COVID at the end of January 2022. At that time, she did not have a booster. A repeat Covid test done in March 19 showed no evidence of Covid    ALLERGIES:  Allergies   Allergen Reactions    Adhesive Tape Other (See Comments) and Rash     Other reaction(s): Unknown    Ace Inhibitors Other (See Comments)     cough    Iv Dye [Iodides]      Stage 4 kidney disease    Nsaids      CANNOT TAKE D/T KIDNEY DISEASE    Metformin And Related Hives, Itching and Rash       HOME MEDICATIONS:  Medications Prior to Admission: melatonin 10 MG CAPS capsule, Take 10 mg by mouth nightly  insulin glargine (BASAGLAR KWIKPEN) 100 UNIT/ML injection pen, Inject 70 Units into the skin 2 times daily  lidocaine-prilocaine (EMLA) 2.5-2.5 % cream, Apply topically as needed for Pain Indications: Apply to dialysis site Apply topically as needed. carvedilol (COREG) 3.125 MG tablet, Take 3.125 mg by mouth 2 times daily  traZODone (DESYREL) 50 MG tablet, TAKE 1 & 1/2 (ONE & ONE-HALF) TABLETS BY MOUTH NIGHTLY  gabapentin (NEURONTIN) 300 MG capsule, Take 1 capsule by mouth 2 times daily for 30 days.   acetaminophen (TYLENOL) 325 MG tablet, Take 650 mg by mouth every 6 hours as needed for Pain  furosemide (LASIX) 80 MG tablet, Take 1 tablet by mouth 2 times daily  sodium bicarbonate 650 MG tablet, Take 1 tablet by mouth 3 times daily  Insulin Pen Needle (EASY TOUCH PEN NEEDLES) 29G X 12MM MISC, 1 each by Does not apply route daily  ReliOn Lancets Micro-Thin 33G MISC, USE AS DIRECTED EVERY DAY  HUMALOG 100 UNIT/ML injection vial, Inject into the skin 3 times daily (before meals) Indications: 15 units and sliding scale   Blood Glucose Monitoring Suppl (TRUE METRIX GO GLUCOSE METER) w/Device KIT, 1 each by Does not apply route 4 times daily  Lancets MISC, 1 each by Does not apply route daily  febuxostat (ULORIC) 40 MG TABS tablet, Take 1 tablet by mouth daily  docusate sodium (COLACE) 100 MG capsule, Take 1 capsule by mouth 2 times daily  tamsulosin (FLOMAX) 0.4 MG capsule, Take 1 capsule by mouth daily  atorvastatin (LIPITOR) 80 MG tablet, Take 1 tablet by mouth daily  aspirin 81 MG chewable tablet, Take 1 tablet by mouth daily  ranolazine (RANEXA) 1000 MG extended release tablet, Take 1 tablet by mouth 2 times daily  busPIRone (BUSPAR) 7.5 MG tablet, Take 1 tablet by mouth 3 times daily  pantoprazole (PROTONIX) 40 MG tablet, Take 1 tablet by mouth every morning (before breakfast)      PAST MEDICAL HISTORY:  Past Medical History:   Diagnosis Date    Backache, unspecified     CHF (congestive heart failure) (HCC)     Chronic kidney disease     Coronary atherosclerosis of artery bypass graft     COVID 1/31/2022    Cramp of limb     Gallstones     Hypertension     Insomnia     Pneumonia     Type II or unspecified type diabetes mellitus with renal manifestations, not stated as uncontrolled(250.40)     Type II or unspecified type diabetes mellitus without mention of complication, not stated as uncontrolled     Unspecified vitamin D deficiency        PAST SURGICAL HISTORY:  Past Surgical History:   Procedure Laterality Date    ANKLE FRACTURE SURGERY      AV FISTULA CREATION  12/14/2021    BREAST SURGERY      CARPAL TUNNEL RELEASE      CHOLECYSTECTOMY, OPEN N/A     CORONARY ARTERY BYPASS GRAFT      x3    DIALYSIS FISTULA CREATION Left 12/14/2021    LEFT AV FISTULA CREATION UPPER EXTREMITY performed by Dunia Traylor MD at [address] [facility] IR TUNNELED CATHETER PLACEMENT GREATER THAN 5 YEARS  8/18/2021    IR TUNNELED CATHETER PLACEMENT GREATER THAN 5 YEARS 8/18/2021 [facility] SPECIAL PROCEDURES    KNEE ARTHROSCOPY      right    OTHER SURGICAL HISTORY  04/06/2022    cvc exchange    TONSILLECTOMY            SOCIAL HISTORY:  Social History     Socioeconomic History    Marital status: Single     Spouse name: Not on file    Number of children: Not on file    Years of education: Not on file    Highest education level: Not on file   Occupational History    Not on file   Tobacco Use    Smoking status: Never Smoker    Smokeless tobacco: Never Used   Vaping Use    Vaping Use: Never used   Substance and Sexual Activity    Alcohol use: No    Drug use: No    Sexual activity: Not Currently   Other Topics Concern    Not on file   Social History Narrative    Not on file     Social Determinants of Health     Financial Resource Strain: Low Risk     Difficulty of Paying Living Expenses: Not very hard   Food Insecurity: No Food Insecurity    Worried About Running Out of Food in the Last Year: Never true    Nany of Food in the Last Year: Never true   Transportation Needs: No Transportation Needs    Lack of Transportation (Medical): No    Lack of Transportation (Non-Medical): No   Physical Activity: Inactive    Days of Exercise per Week: 0 days    Minutes of Exercise per Session: 0 min   Stress: Stress Concern Present    Feeling of Stress :  To some extent   Social Connections: Socially Isolated    Frequency of Communication with Friends and Family: More than three times a week    Frequency of Social Gatherings with Friends and Family: More than three times a week    Attends Druze Services: Never    Active Member of Clubs or Organizations: No    Attends Club or Organization Meetings: Never    Marital Status: Never    Intimate Partner Violence:     Fear of Current or Ex-Partner: Not on file    Emotionally Abused: Not on file    Physically Abused: Not on file    Sexually Abused: Not on file   Housing Stability: [address] Unable to Pay for Housing in the Last Year: No    Number of [place] in the Last Year: 1    Unstable Housing in the Last Year: No       FAMILY HISTORY:  Family History   Problem Relation Age of Onset    Diabetes Father     Heart Failure Father        REVIEW OF SYSTEMS:  All other systems reviewed and are negative. PHYSICAL EXAM:  Vital Signs Blood pressure (!) 154/33, pulse 93, temperature 98.4 °F (36.9 °C), temperature source Oral, resp. rate 23, height 5' 5\" (1.651 m), weight 248 lb (112.5 kg), SpO2 94 %. Oxygen Amount and Delivery: O2 Flow Rate (L/min): 3 L/min    Admission Weight Weight: 248 lb (112.5 kg)    General Appearance   Middle-aged female no acute respiratory distress  Head  Normocephalic, without obvious abnormality, atraumatic    Eyes  conjunctivae/corneas clear. PERRL, EOM's intact. Fundi benign. ENT macroglossia low overhanging soft  Neck  no adenopathy, no carotid bruit, no JVD, supple, symmetrical, trachea midline and thyroid not enlarged, symmetric, no tenderness/mass/nodules  Lungs minutes per  Heart: regular rate and rhythm, S1, S2 normal, no murmur, click, rub or gallop  Abdomen  soft, non-tender; bowel sounds normal; no masses,  no organomegaly  Extremities    Skin  Skin color, texture, turgor normal. No rashes or lesions  Neurologic: Alert and oriented X 3, normal strength and tone.          Imaging  X-ray shows increased interstitial markings and right lower lobe infiltrate/effusion, worse than her early April        Lab Review  CBC     Lab Results   Component Value Date    WBC 5.8 04/13/2022    RBC 3.79 04/13/2022    HGB 11.8 04/13/2022    HCT 35.2 04/13/2022     04/13/2022    MCV 92.9 04/13/2022    MCH 31.2 04/13/2022    MCHC 33.6 04/13/2022    RDW 15.4 04/13/2022    LYMPHOPCT 15 04/13/2022    LYMPHOPCT 23.1 04/09/2015    MONOPCT 6 04/13/2022    MONOPCT 4.6 04/09/2015    EOSPCT 6.7 04/09/2015    BASOPCT 1 04/13/2022    BASOPCT 0.7 04/09/2015    MONOSABS 0.40 04/13/2022    MONOSABS 0.4 04/09/2015    LYMPHSABS 0.90 04/13/2022    LYMPHSABS 1.8 04/09/2015    EOSABS 0.10 04/13/2022    EOSABS 0.5 04/09/2015    BASOSABS 0.10 04/13/2022    DIFFTYPE NOT REPORTED 02/16/2022       BMP   Lab Results   Component Value Date     04/14/2022    K 2.7 04/14/2022    CL 98 04/14/2022    CO2 25 04/14/2022    BUN 16 04/14/2022    CREATININE 1.59 04/14/2022    GLUCOSE 352 04/14/2022    CALCIUM 8.2 04/14/2022       LFTS  Lab Results   Component Value Date    ALKPHOS 88 01/17/2022    ALT 9 01/17/2022    AST 11 01/17/2022    PROT 6.3 01/17/2022    BILITOT 0.37 01/17/2022    LABALBU 3.6 01/17/2022       INR  No results for input(s): PROTIME, INR in the last 72 hours. APTT  No results for input(s): APTT in the last 72 hours.     Lactic Acid  No results found for: LACTA     PRO-BNP   Recent Labs     04/13/22  1605   PROBNP 2,852*           ABGs: No results found for: PHART, PO2ART, IDN2MVV    Lab Results   Component Value Date    MODE CONDITION NO LONGER WARRANTS 04/06/2021         Impression    Diabetic ketoacidosis  Right lower lobe infiltrate/effusion  UTI  Chronic hypoxic respiratory failure  Stage renal disease on dialysis  ERICK  (clinical diagnosis)  Morbid obesity    Plan:      Continue insulin drip and DKA protocol until anion gap has normalized x2  Keep n.p.o. for now  Rocephin 2 g as well as Diflucan has been started, await culture result  Oxygen to keep saturations greater than 88%  We will check high-resolution CT of chest when more stable  Outpatient sleep study  Treatment of her electrolytes per nephrology as well as dialysis  Recheck echocardiogram  Continue ICU level care  Extensive time spent talking to her sister done and explaining her current medical problems  DVT prophylaxis  Continue ICU level of care  Discussed with nursing staff and nephrology  Critical care time spent 65 minutes including review of records (outpatient and inpatient)

## 2022-04-14 NOTE — ED NOTES
Report given to DEDE AGUILERA from ED. Report method in person   The following was reviewed with receiving RN:   Current vital signs:  BP (!) 145/63   Pulse 66   Temp 98.6 °F (37 °C)   Resp 14   Ht 5' 5\" (1.651 m)   Wt 248 lb (112.5 kg)   SpO2 96%   BMI 41.27 kg/m²                      Any medication or safety alerts were reviewed. Any pending diagnostics and notifications were also reviewed, as well as any safety concerns or issues, abnormal labs, abnormal imaging, and abnormal assessment findings. Questions were answered. LAB CALLED AND ADVISED BMP SENT WAS HEMOLYZED. ALE RN WILL HAVE LAB DRAW HER ONCE SHE GETS TO ICU. OTHER LABS ORDERED ALSO. FSBG WAS OBTAINED AND VERIFIED PER ALE AND JAIR RN PRIOR TO DEPART TO ICU.          Hayden Lozano RN  04/14/22 [id number]

## 2022-04-14 NOTE — PROGRESS NOTES
Page out to Dr. Jazmin Mendez regarding patient's most recent blood work including potassium of 2.7. Awaiting call back.

## 2022-04-14 NOTE — PROGRESS NOTES
HEMODIALYSIS PRE-TREATMENT NOTE    Patient Identifiers prior to treatment: Name,     Isolation Required:  Yes                      Isolation Type: Contact/MRSA       (please document if patient is being managed as a PUI/COVID-19 patient)        Hepatitis status:                           Date Drawn                             Result  Hepatitis B Surface Antigen 3/30/22     Neg                     Hepatitis B Surface Antibody 3/30/22 Immune        Hepatitis B Core Antibody 3/30/22 Neg          How was Hepatitis Status verified: Epic     Was a copy of the labs you documented provided to facility for the patient's chart: Epic    Hemodialysis orders verified: Yes    Access Within normal limits ( I.e. s/s of infection,...): WNL, no signs or symptoms of infection noted     Pre-Assessment completed: Yes    Pre-dialysis report received from: Nakia Walls RN                      Time: 17:00

## 2022-04-14 NOTE — PROGRESS NOTES
Dr. Kelsea Nieves at bedside. New orders obtained for 40 mEq of potassium replacement and for 10 mmol's of sodium phosphate replacement.

## 2022-04-14 NOTE — CONSULTS
[facility] Cardiology Cardiology    Consult                        Today's Date: 4/14/2022  Patient Name: Terrence Cavanaugh  Date of admission: 4/13/2022  3:17 PM  Patient's age: 57 y.o., 1960  Admission Dx: DKA, type 2, not at goal [facility]) [E11.10]  Diabetic ketoacidosis without coma associated with type 2 diabetes mellitus ([address] [E11.10]  Dyspnea, unspecified type [R06.00]    Reason for Consult:  Cardiac evaluation    Requesting Physician: Anju Burns MD    CHIEF COMPLAINT:  Shortness of breath    History Obtained From:  patient, electronic medical record    HISTORY OF PRESENT ILLNESS:      The patient is a 57 y.o.  female who is admitted to the hospital for Shortness of breath    Terrence Cavanaugh is a 57 y.o. female with past medical history of CHF, CKD on hemodialysis Tuesday and Thursday, hypertension, diabetes who presents for complaints of shortness of breath. Patient states shortness of breath has been ongoing for the last 2 to 3 days and has been progressively worsening. She states she did get her full session of dialysis yesterday however this did not help her shortness of breath. She has been having associated chest pains as well as a sensation of nausea but no vomiting with the shortness of breath. Patient does admit to using 3 L nasal cannula chronically at home. Denies fever or chills. No abdominal pain. Patient did receive 2 doses of the COVID-19 vaccination as well as was infected with Covid in January 2022. Per EMS patient did have initial systolic blood pressure in the 220s which improved after 1 tab of nitro. Patient states symptoms did not improve with nitro. Per EMS patient also hyperglycemic with sugar greater than 500, patient does report she is insulin-dependent diabetic however given she was not feeling well today she did not take her afternoon dose of insulin. On my exam patient very lethargic, falling asleep during exam and difficult to keep awake.  Denies any CP presently. She stated she did have one episode of pain yesterday in the ER when she couldn't breath. States the SL NTG took the pain away and it has not reoccurred. States her breathing is OK now but cannot lay flat. On NC oxygen. Currently no distress    Past Medical History:   has a past medical history of Backache, unspecified, CHF (congestive heart failure) ([address] Chronic kidney disease, Coronary atherosclerosis of artery bypass graft, COVID, Cramp of limb, Gallstones, Hypertension, Insomnia, Pneumonia, Type II or unspecified type diabetes mellitus with renal manifestations, not stated as uncontrolled(250.40), Type II or unspecified type diabetes mellitus without mention of complication, not stated as uncontrolled, and Unspecified vitamin D deficiency. Past Surgical History:   has a past surgical history that includes Coronary artery bypass graft; Knee arthroscopy; Carpal tunnel release; Breast surgery; Tonsillectomy; Hand surgery; Ankle fracture surgery; Cholecystectomy, open (N/A); IR TUNNELED CVC PLACE WO SQ PORT/PUMP > 5 YEARS (8/18/2021); AV fistula creation (12/14/2021); Dialysis fistula creation (Left, 12/14/2021); and other surgical history (04/06/2022). Home Medications:    Prior to Admission medications    Medication Sig Start Date End Date Taking? Authorizing Provider   melatonin 10 MG CAPS capsule Take 10 mg by mouth nightly   Yes Historical Provider, MD   insulin glargine (BASAGLAR KWIKPEN) 100 UNIT/ML injection pen Inject 70 Units into the skin 2 times daily   Yes Historical Provider, MD   lidocaine-prilocaine (EMLA) 2.5-2.5 % cream Apply topically as needed for Pain Indications: Apply to dialysis site Apply topically as needed.    Yes Historical Provider, MD   carvedilol (COREG) 3.125 MG tablet Take 3.125 mg by mouth 2 times daily   Yes Historical Provider, MD   traZODone (DESYREL) 50 MG tablet TAKE 1 & 1/2 (ONE & ONE-HALF) TABLETS BY MOUTH NIGHTLY 4/11/22   AFSANEH Ya - CNP gabapentin (NEURONTIN) 300 MG capsule Take 1 capsule by mouth 2 times daily for 30 days.  4/11/22 5/11/22  AFSANEH Aragon CNP   acetaminophen (TYLENOL) 325 MG tablet Take 650 mg by mouth every 6 hours as needed for Pain    Historical Provider, MD   furosemide (LASIX) 80 MG tablet Take 1 tablet by mouth 2 times daily 3/8/22   AFSANEH Aragon CNP   sodium bicarbonate 650 MG tablet Take 1 tablet by mouth 3 times daily 3/2/22   [facility], MD   Insulin Pen Needle (EASY TOUCH PEN NEEDLES) 29G X 12MM MISC 1 each by Does not apply route daily 3/1/22   AFSANEH Aragon CNP   ReliOn Lancets Micro-Thin 33G MISC USE AS DIRECTED EVERY DAY 1/28/22   Historical Provider, MD   HUMALOG 100 UNIT/ML injection vial Inject into the skin 3 times daily (before meals) Indications: 15 units and sliding scale  11/29/21   Historical Provider, MD   Blood Glucose Monitoring Suppl (TRUE METRIX GO GLUCOSE METER) w/Device KIT 1 each by Does not apply route 4 times daily 11/30/21   AFSANEH Aragon CNP   Lancets MISC 1 each by Does not apply route daily 11/30/21   AFSANEH Aragon CNP   febuxostat (ULORIC) 40 MG TABS tablet Take 1 tablet by mouth daily 11/22/21   [facility] Eric MD   docusate sodium (COLACE) 100 MG capsule Take 1 capsule by mouth 2 times daily 11/22/21   [facility]cy Eric MD   tamsulosin [employer]) 0.4 MG capsule Take 1 capsule by mouth daily 11/23/21   Mercy Eric MD   atorvastatin (LIPITOR) 80 MG tablet Take 1 tablet by mouth daily 11/3/21   AFSANEH Aragon CNP   aspirin 81 MG chewable tablet Take 1 tablet by mouth daily 9/25/21   Hira Dempsey MD   ranolazine (RANEXA) 1000 MG extended release tablet Take 1 tablet by mouth 2 times daily 9/25/21   Hira Dempsey MD   busPIRone (BUSPAR) 7.5 MG tablet Take 1 tablet by mouth 3 times daily 9/25/21   Hira Dempsey MD   pantoprazole (PROTONIX) 40 MG tablet Take 1 tablet by mouth every morning (before breakfast) 9/25/21   Hira Dempsey MD allopurinol (ZYLOPRIM) 100 MG tablet Take 1 tablet by mouth daily 4/14/21   Oscar NAVI JallohN - CNP       Allergies:  Adhesive tape, Ace inhibitors, Iv dye [iodides], Nsaids, and Metformin and related    Social History:   reports that she has never smoked. She has never used smokeless tobacco. She reports that she does not drink alcohol and does not use drugs. Family History: family history includes Diabetes in her father; Heart Failure in her father. No h/o sudden cardiac death. No for premature CAD    REVIEW OF SYSTEMS:    Constitutional: there has been no unanticipated weight loss. There's been No change in energy level, No change in activity level. Eyes: No visual changes or diplopia. No scleral icterus. ENT: No Headaches, hearing loss or vertigo. No mouth sores or sore throat. Cardiovascular: see above  Respiratory: see above  Gastrointestinal: No abdominal pain, appetite loss, blood in stools. Genitourinary: No dysuria, trouble voiding, or hematuria. Musculoskeletal:  No gait disturbance, No weakness or joint complaints. Integumentary: No rash or pruritis. Neurological: No headache or diplopia. No tingling  Psychiatric: No anxiety, or depression. Endocrine: No temperature intolerance. Hematologic/Lymphatic: No abnormal bruising or bleeding, blood clots or swollen lymph nodes. Allergic/Immunologic: No nasal congestion or hives. PHYSICAL EXAM:      BP (!) 161/50   Pulse 94   Temp 98.5 °F (36.9 °C) (Oral)   Resp 22   Ht 5' 5\" (1.651 m)   Wt 248 lb (112.5 kg)   SpO2 96%   BMI 41.27 kg/m²    Constitutional and General Appearance: alert, cooperative, no distress and appears stated age  HEENT: PERRL, no cervical lymphadenopathy. No masses palpable.  Normal oral mucosa  Respiratory:  Normal excursion and expansion without use of accessory muscles  Resp Auscultation: Diminished breath sounds throughout, mild SOB at rest with conversation but difficult to assess b/c she keeps falling asleep  NC without distress  Cardiovascular:  Heart tones are crisp and normal. regular S1 and S2. SR with frequent PACs  Jugular venous pulsation Normal  Abdomen:   soft  Bowel sounds present  morbidly obese  Extremities:   no LE edema  Neurological:  Alert and oriented but sleepy      DATA:    Diagnostics:    EKG: normal sinus rhythm, nonspecific ST and T waves changes. ECHO: obtained and reviewed. Ejection fraction: 55%  Stress Test: previously taken 4/2021 and show as below. Cardiac Angiography: previously taken 2019 and show as below. PMH from office  1. Hypertension  2. Dyslipidemia  3. DVT on Eliquis. 4. Diabetes mellitus type 2.  5. Chronic diastolic heart failure. 6. CKD. 7. Syncope. 8. Coronary artery disease status post CABG x3.  9. Cardiac catheterization on 10/19 revealed patent 3 of 3 grafts with severe OM disease distal to the anastomosis that was not amenable to PCI. 10. 2D echo on 12/20 revealed EF of 60-65% with mild MR and TR  11. Nuclear stress test on 04/21 revealed lateral infarction with stalin-infarct ischemia affecting 13% of the myocardium, ejection fraction 47%, hypokinesis of the inferior septal wall. Medically treated. OV note Nay 5/6/2021  Plan Notes  1. Recent syncope x2. Based on symptomatology is most likely orthostatic hypotension due to hypovolemia.  -change Bumex to 1 mg p.o. q.day  -change Coreg 3.125 mg p.o. b.i.d.  -if continues to feel dizzy will recommend midodrine. 2. Coronary artery disease status post CABG x3. Patent grafts on 10/19 with severe OM disease distal to the anastomosis not amenable to PCI. -reviewed nuclear stress test from 04/21 which revealed lateral infarction with stalin-infarct ischemia-medically treated  -increase Ranexa 1000 mg p.o. b.i.d.  -continue aspirin, Eliquis, Imdur, statin, beta-blocker  3. Chronic diastolic heart failure. Non exacerbation. Review 2D echo from 12/20 which revealed EF of 60-65%. -see plan 1.  4. History of DVT.  On Eliquis. 5. Dyslipidemia. On a statin. 6. Essential hypertension. Currently hypotensive. See plan 1.  7. CKD. Following with Nephrology. Echo 4/14/22  Summary  Left ventricle is normal in size. Moderate left ventricular hypertrophy. Global left ventricular systolic function is normal. Estimated LV EF >55 %. No obvious wall motion abnormality seen. Evidence of mild (grade I)  diastolic dysfunction. Left atrium is normal in size. Mild tricuspid regurgitation. No pulmonary hypertension. Estimated right ventricular systolic pressure is  72NHNO. Labs:     CBC:   Recent Labs     04/13/22  1535   WBC 5.8   HGB 11.8*   HCT 35.2*        BMP:   Recent Labs     04/13/22  1605 04/13/22  1939 04/14/22  0423 04/14/22  0536     --   --  141   K 3.3*  --   --  3.5*   CO2 18*  --   --  23   BUN 18  --   --  16   CREATININE 1.78*  --   --  1.68*   LABGLOM 29*  --   --  31*   GLUCOSE 575*   < > 205 201*    < > = values in this interval not displayed. BNP: No results for input(s): BNP in the last 72 hours. PT/INR: No results for input(s): PROTIME, INR in the last 72 hours. APTT:No results for input(s): APTT in the last 72 hours. CARDIAC ENZYMES:No results for input(s): CKTOTAL, CKMB, CKMBINDEX, TROPONINI in the last 72 hours. FASTING LIPID PANEL:  Lab Results   Component Value Date    HDL 43 04/09/2015    LDLCALC 28 04/09/2015    TRIG 168 04/09/2015     LIVER PROFILE:No results for input(s): AST, ALT, LABALBU in the last 72 hours.     IMPRESSION:    Patient Active Problem List   Diagnosis    Atherosclerosis of coronary artery bypass graft of native heart without angina pectoris    Chronic diastolic heart failure (HCC)    Diabetic polyneuropathy associated with type 2 diabetes mellitus ([facility]    History of coronary artery bypass graft    Iron deficiency anemia    Spinal stenosis of lumbar region with neurogenic claudication    Mixed hyperlipidemia    Type 2 diabetes mellitus with kidney complication, with long-term current use of insulin (HCC)    Thyroid nodule greater than or equal to 1 cm in diameter incidentally noted on imaging study    Essential hypertension    Chronic ischemic heart disease    Ischemic stroke of frontal lobe (HCC)    Morbid obesity with BMI of 40.0-44.9, adult (HCC)    Disequilibrium syndrome    Generalized weakness    Long-term memory loss    Muscle right arm weakness    Anxiety    Chronic midline low back pain with bilateral sciatica    Tremor    COVID    End-stage renal disease ([facility]    DKA, type 2, not at goal [facility])     1. DKA  2. Acute on chronic diastolic CHF exacerbation  3. Trop elevation, demand ischemia secondary to DKA, CHF, & AURELIA  4. AURELIA on CKD  5. Hx of CAD with prior CABG  6. Morbid obesity with BMI >40  7. Hx of DVT - on Eliquis  8. HTN  9. HLP    RECOMMENDATIONS:  Stable. Echo reviewed. Trop elevation likely demand from AURELIA and DKA. Will follow trend. ECG without changes. Denies any further CP. Continue PO ASA, statin, BB, & Ranexa. Recent stress reviewed and continue med management at this time unless acute issues develop  Home meds listed as Lasix 80mg BID however our med list had been Bumex 1mg BID. Continue to monitor on current dose. Echo reviewed with preserved LVEF and G1 DD. Continue BB  Hx of DVT on Eliquis at home- continue SQ Heparin  Appreciate nephrology assistance with fluid management. Continue diuretic as above with their guidance. Keep K >4 and Mg >2 (with renal clearance)      Discussed with patient and Nurse    AFSANEH Garcia - CNP    Attending Cardiologist Addendum: I have reviewed the history, physical, subjective, objective, assessment, and plan with the CNP and agree with the note. I have made changes to the note above as needed. Thank you for allowing me to participate in the care of this patient, please do not hesitate to call if you have any questions. Kodi Mcguire, [id number] [facility]  [facility] [employer]  [phone]

## 2022-04-14 NOTE — FLOWSHEET NOTE
04/14/22 1641   Encounter Summary   Services provided to: Patient   Referral/Consult From: Rounding   Complexity of Encounter Low   Length of Encounter 15 minutes   Spiritual/Jehovah's witness   Type Spiritual support   Assessment Sleeping   Intervention Prayer

## 2022-04-14 NOTE — CARE COORDINATION
CASE MANAGEMENT NOTE:    Admission Date:  4/13/2022 Celia Lundberg is a 57 y.o.  female    Admitted for : DKA, type 2, not at goal [facility]) [E11.10]  Diabetic ketoacidosis without coma associated with type 2 diabetes mellitus ([facility] [E11.10]  Dyspnea, unspecified type [R06.00]    Met with:  Patient    PCP:  Belen Martin                                Insurance:  Patti Braswellg      Is patient alert and oriented at time of discussion:  Yes    Current Residence/ Living Arrangements:  independently at home Mom lives w/ Her & she is her caregiver. Sister is staying w/ her now. Current Services PTA:  Livier Bhatti PT/OT    Does patient go to outpatient dialysis: Yes  If yes, location and chair time: [facility] on [address], T/TH at [place]    Is patient agreeable to VNS: Yes    Freedom of choice provided:  Yes    List of [address] provided: No    VNS chosen:  Yes, Wants [facility], has had in the past. Referral sent    DME:  walker, wheelchair and shower chair, Glucometer    Home Oxygen: No    Nebulizer: No    CPAP/BIPAP: No    Supplier: N/A    Potential Assistance Needed: Yes, VNS    SNF needed: No, Has been to [facility], denies the need at this time. Freedom of choice and list provided: NA    Pharmacy:  [facility]       Does Patient want to use MEDS to BEDS? No    Is patient currently receiving oral anticoagulation therapy? No    Is the Patient an MARIVEL LYNN [facility] with Readmission Risk Score greater than 14%? No  If yes, pt needs a follow up appointment made within 7 days. Family Members/Caregivers that pt would like involved in their care:    Yes    If yes, list name here:  Renay Fleischer, Jamee Shell    Transportation Provider:  Patient             Discharge Plan:  4/14/22 Patti Jessica Pt. Lives in [place], [place] Lives w/ Her & she is her Caregiver. DME- Walker, WC, SC, Glucometer. Wants VNS, [address], Referral sent, Current w/ [facility] on [address], T/TH at [place].  IV Rocephin, Insulin GTT, BNP [phone], Has been to [facility], denies the need. Follows at [facility] for PT/OT, wants VNS prior to returning. Christiano need signed/completed.  Will follow//KB                 Electronically signed by: Marilynn Lawler RN on 4/14/2022 at 2:37 PM

## 2022-04-14 NOTE — CONSULTS
Nephrology ESRD Consult Note    Reason for Consult:  End stage renal disease  Requesting Physician: Dr Wong Ortega. History of Present Illness: This is a 57 y.o. female with hypertension, type 2 diabetes mellitus, end-stage renal disease on hemodialysis   who was recently taken off dialysis about 6 weeks ago due to regaining residual kidney function. Over the course of the last 2 weeks patient  was restarted on hemodialysis due to worsening fluid overload. Initially had problems with her  tunneled catheter which was replaced last week- patient was dialyzed on Saturday, 4/9/2021 and yesterday-4/13/22  at the outpatient dialysis unit. Patient presented with complaints of shortness of breath over the last 2 to 3 days progressively worsening. Patient had associated  Nausea, chest pain but no vomiting. She is on 3 L nasal cannula chronically at home. Patient was found to be in acute DKA with blood sugars greater than 500 was started on the modified DKA protocol. Blood pressures on admission were elevated above 200s and her chest x-ray showed evidence of pulmonary vascular congestion. Labs showed a potassium of 3.3 with BUN/creatinine of 18 and 1.78 mg/dL, bicarbonate of 18 and troponin of 78. Beta hydroxybutyrate was 7.57.         Past Medical History:        Diagnosis Date    Backache, unspecified     CHF (congestive heart failure) (HCC)     Chronic kidney disease     Coronary atherosclerosis of artery bypass graft     COVID 1/31/2022    Cramp of limb     Gallstones     Hypertension     Insomnia     Pneumonia     Type II or unspecified type diabetes mellitus with renal manifestations, not stated as uncontrolled(250.40)     Type II or unspecified type diabetes mellitus without mention of complication, not stated as uncontrolled     Unspecified vitamin D deficiency          Past Surgical History:        Procedure Laterality Date    ANKLE FRACTURE SURGERY      AV FISTULA CREATION 12/14/2021    BREAST SURGERY      CARPAL TUNNEL RELEASE      CHOLECYSTECTOMY, OPEN N/A     CORONARY ARTERY BYPASS GRAFT      x3    DIALYSIS FISTULA CREATION Left 12/14/2021    LEFT AV FISTULA CREATION UPPER EXTREMITY performed by Checo Mcpherson MD at [address] [facility] IR TUNNELED CATHETER PLACEMENT GREATER THAN 5 YEARS  8/18/2021    IR TUNNELED CATHETER PLACEMENT GREATER THAN 5 YEARS 8/18/2021 [facility] SPECIAL PROCEDURES    KNEE ARTHROSCOPY      right    OTHER SURGICAL HISTORY  04/06/2022    cvc exchange    TONSILLECTOMY         Current Medications:    sodium chloride flush 0.9 % injection 5-40 mL, 2 times per day  sodium chloride flush 0.9 % injection 5-40 mL, PRN  0.9 % sodium chloride infusion, PRN  ondansetron (ZOFRAN-ODT) disintegrating tablet 4 mg, Q8H PRN   Or  ondansetron (ZOFRAN) injection 4 mg, Q6H PRN  cefTRIAXone (ROCEPHIN) 1000 mg IVPB in 50 mL D5W minibag, Q24H  aspirin chewable tablet 81 mg, Daily  ranolazine (RANEXA) extended release tablet 1,000 mg, BID  busPIRone (BUSPAR) tablet 7.5 mg, TID  pantoprazole (PROTONIX) tablet 40 mg, QAM AC  atorvastatin (LIPITOR) tablet 80 mg, Daily  febuxostat (ULORIC) tablet 40 mg, Daily  docusate sodium (COLACE) capsule 100 mg, BID  tamsulosin (FLOMAX) capsule 0.4 mg, Daily  insulin lispro (HUMALOG) injection vial 15 Units, TID AC  sodium bicarbonate tablet 650 mg, TID  furosemide (LASIX) tablet 80 mg, BID  acetaminophen (TYLENOL) tablet 650 mg, Q6H PRN  traZODone (DESYREL) tablet 50 mg, Nightly  gabapentin (NEURONTIN) capsule 300 mg, BID  melatonin tablet 9 mg, Nightly  insulin glargine (LANTUS) injection vial 70 Units, BID  lidocaine-prilocaine (EMLA) cream, PRN  carvedilol (COREG) tablet 3.125 mg, BID  magnesium sulfate 1000 mg in dextrose 5% 100 mL IVPB, PRN  sodium phosphate 18 mmol in dextrose 5 % 250 mL IVPB, PRN   Or  sodium phosphate 36 mmol in dextrose 5 % 250 mL IVPB, PRN  potassium chloride 10 mEq/100 mL IVPB (Peripheral Line), PRN  heparin (porcine) injection 5,000 Units, 3 times per day  glucose (GLUTOSE) 40 % oral gel 15 g, PRN  dextrose 50 % IV solution, PRN  glucagon (rDNA) injection 1 mg, PRN  dextrose 5 % solution, PRN  insulin regular (HUMULIN R;NOVOLIN R) 100 Units in sodium chloride 0.9 % 100 mL infusion, Continuous  potassium chloride 40 mEq in sodium chloride 0.9 % 1,000 mL infusion, Continuous  miconazole (MICOTIN) 2 % powder, BID  acetaminophen (TYLENOL) tablet 650 mg, Q4H PRN        Allergies:  Adhesive tape, Ace inhibitors, Iv dye [iodides], Nsaids, and Metformin and related    Social History:    Social History     Socioeconomic History    Marital status: Single     Spouse name: Not on file    Number of children: Not on file    Years of education: Not on file    Highest education level: Not on file   Occupational History    Not on file   Tobacco Use    Smoking status: Never Smoker    Smokeless tobacco: Never Used   Vaping Use    Vaping Use: Never used   Substance and Sexual Activity    Alcohol use: No    Drug use: No    Sexual activity: Not Currently   Other Topics Concern    Not on file   Social History Narrative    Not on file     Social Determinants of Health     Financial Resource Strain: Low Risk     Difficulty of Paying Living Expenses: Not very hard   Food Insecurity: No Food Insecurity    Worried About Running Out of Food in the Last Year: Never true    Nany of Food in the Last Year: Never true   Transportation Needs: No Transportation Needs    Lack of Transportation (Medical): No    Lack of Transportation (Non-Medical): No   Physical Activity: Inactive    Days of Exercise per Week: 0 days    Minutes of Exercise per Session: 0 min   Stress: Stress Concern Present    Feeling of Stress :  To some extent   Social Connections: Socially Isolated    Frequency of Communication with Friends and Family: More than three times a week    Frequency of Social Gatherings with Friends and Family: More than three times a week    Attends Mandaen Services: Never    Active Member of Clubs or Organizations: No    Attends Club or Organization Meetings: Never    Marital Status: Never    Intimate Partner Violence:     Fear of Current or Ex-Partner: Not on file    Emotionally Abused: Not on file    Physically Abused: Not on file    Sexually Abused: Not on file   Housing Stability: [address] Unable to Pay for Housing in the Last Year: No    Number of [place] in the Last Year: 1    Unstable Housing in the Last Year: No       Family History:   Family History   Problem Relation Age of Onset    Diabetes Father     Heart Failure Father        Review of Systems:    Pertinent positives stated above in HPI. All other systems were reviewed and were negative. Objective:  Constitutional:    CURRENT TEMPERATURE:  Temp: 98.4 °F (36.9 °C)  MAXIMUM TEMPERATURE OVER 24HRS:  Temp (24hrs), Av.5 °F (36.9 °C), Min:98.4 °F (36.9 °C), Max:98.6 °F (37 °C)    CURRENT RESPIRATORY RATE:  Resp: 23  CURRENT PULSE:  Pulse: 93  CURRENT BLOOD PRESSURE:  BP: (!) 154/33  24HR BLOOD PRESSURE RANGE:  Systolic (05DIY), JWU:673 , Min:141 , ZVU:620   ; Diastolic (75VCC), XUT:06, Min:33, Max:79    24HR INTAKE/OUTPUT:      Intake/Output Summary (Last 24 hours) at 2022 1100  Last data filed at 2022 1000  Gross per 24 hour   Intake 100.02 ml   Output 400 ml   Net -299.98 ml           Physical Exam:  GENERAL APPEARANCE: Alert and cooperative, and appears to be in no acute distress 3 L nasal cannula oxygen  HEAD: normocephalic  EYES: PERRL, EOMI. Not pale, anicteric   NOSE:  No nasal discharge. THROAT:  Oral cavity and pharynx normal. Moist  NECK: Neck supple, non-tender without lymphadenopathy, masses or thyromegaly. JVD-neg  CARDIAC: Normal S1 and S2. No S3, S4 or murmurs. Rhythm is regular. LUNGS: Clear to auscultation and percussion without rales, rhonchi, wheezing or diminished breath sounds.   ABD-Soft non distended, BS+ Non tender no organomegally  BACK: Examination of the spine reveals  no spinal deformity, without tenderness,   MUSKULOSKELETAL: Adequately aligned spine. No joint erythema or tenderness. EXTREMITIES: 1+ edema. Peripheral pulses intact. NEURO:Alert oriented x 3 ,power 5/5 in all extremities      Labs:  PTH:  No results found for: PTH  abs:   CBC:   Recent Labs     04/13/22  1535   WBC 5.8   RBC 3.79*   HGB 11.8*   HCT 35.2*   MCV 92.9   MCH 31.2   MCHC 33.6   RDW 15.4*      MPV 9.4      BMP:   Recent Labs     04/13/22  1605 04/13/22  1939 04/14/22  0423 04/14/22  0536 04/14/22  0956     --   --  141 135   K 3.3*  --   --  3.5* 2.7*   CL 94*  --   --  102 98   CO2 18*  --   --  23 25   BUN 18  --   --  16 16   CREATININE 1.78*  --   --  1.68* 1.59*   GLUCOSE 575*   < > 205 201* 352*   CALCIUM 8.7  --   --  8.9 8.2*    < > = values in this interval not displayed. Phosphorus:    Recent Labs     04/14/22  0536 04/14/22  0956   PHOS 2.2* 2.1*     Magnesium:   Recent Labs     04/13/22  1605 04/14/22  0536 04/14/22  0956   MG 2.2 2.1 1.9     Albumin: No results for input(s): LABALBU in the last 72 hours. Assessment:  1. End-stage renal disease on hemodialysis by way of a right-sided tunneled catheter, transiently taken off dialysis 6 weeks ago and re- started due to worsening fluid overload 1 week ago. Patient still makes urine with urine output of 950 cc over the last 24 hours. 2.  Hypertensive urgency- improving    3. Acute DKA currently on DKA protocol    4. Hypokalemia #2 to osmotic diuresis and  total body depletion. 5.  Hypophosphatemia    6. CHF with diastolic dysfunction and evidence of fluid overload pulmonary vascular congestion on chest xray    7. Urinary tract infection with pyuria      Plan:  Replace potassium with IV KCl 40 mEq in concentrated solution. Decrease IV fluids to 50  cc/h once the anion gap closes.   IV sodium phosphate replacement 10 mmol  Continue IV Rocephin  Modified sliding scale electrolyte replacement due to renal failure  We will plan for isolated ultrafiltration treatment today for volume overload  Urine culture    Sy Armstrong M.D, LESIA  Nephrologist    Thank you for the consultation.

## 2022-04-14 NOTE — PROGRESS NOTES
Writer spoke with Dr. Tamiko Noe about patient not having sliding scales for phosphorus, magnesium, and potassium, He gave orders to replace based on normal sliding scales. Dr. Tamiko Noe stated not to give more than 40 mEq of potassium.

## 2022-04-14 NOTE — PROGRESS NOTES
New orders from Dr. Jori Hall to give 40 of potassium PO and to d/c insulin drip which has been already done. Keep Pt on a 1500 mL fluid restriction.

## 2022-04-14 NOTE — PROGRESS NOTES
New orders obtained from CC to discontinue insulin drip 2 hours after patient's lantus has been given. Lantus 50 u BID has been ordered and a medium sliding scale. Pt's diet has also been resumed.

## 2022-04-14 NOTE — PROGRESS NOTES
Radha Tilley paged in regards to most current lab results and to inform him patient is will be getting off of the insulin drip. RN needs to clarify fluid orders with physician.

## 2022-04-14 NOTE — H&P
Family Medicine Admit Note    PCP: AFSANEH Schmidt CNP    Date of Admission: 4/13/2022    Date of Service: Pt seen/examined on 4/14/22 and Admitted to Inpatient. Chief Complaint:  Shortness of Breath      History Of Present Illness: The patient is a 57 y.o. female who presents to [address] [place] [address] with complaints of shortness of breath. She has a known hx of CHF, DM2 and ESRD on hemodialysis. She states that the SOB has been progressively worsening over the past few days. She got her full dialysis treatment on Tuesday and is due for another session today. She reports that dialysis did not improve her SOB. She reports associated chest pains and nausea without vomiting. She is on 3L O2 per NC at home. She denies any fevers, chills, cough, abdominal pain, diarrhea or melena.     Past Medical History:        Diagnosis Date    Backache, unspecified     CHF (congestive heart failure) (HCC)     Chronic kidney disease     Coronary atherosclerosis of artery bypass graft     COVID 1/31/2022    Cramp of limb     Gallstones     Hypertension     Insomnia     Pneumonia     Type II or unspecified type diabetes mellitus with renal manifestations, not stated as uncontrolled(250.40)     Type II or unspecified type diabetes mellitus without mention of complication, not stated as uncontrolled     Unspecified vitamin D deficiency        Past Surgical History:        Procedure Laterality Date    ANKLE FRACTURE SURGERY      AV FISTULA CREATION  12/14/2021    BREAST SURGERY      CARPAL TUNNEL RELEASE      CHOLECYSTECTOMY, OPEN N/A     CORONARY ARTERY BYPASS GRAFT      x3    DIALYSIS FISTULA CREATION Left 12/14/2021    LEFT AV FISTULA CREATION UPPER EXTREMITY [facility] [address] [facility]UNNELED CATHETER PLACEMENT GREATER THAN 5 YEARS  8/18/2021    IR TUNNELED CATHETER PLACEMENT GREATER THAN 5 YEARS 8/18/2021 [facility] SPECIAL PROCEDURES    KNEE ARTHROSCOPY right    OTHER SURGICAL HISTORY  04/06/2022    cvc exchange    TONSILLECTOMY         Medications Prior to Admission:    Prior to Admission medications    Medication Sig Start Date End Date Taking? Authorizing Provider   melatonin 10 MG CAPS capsule Take 10 mg by mouth nightly   Yes Historical Provider, MD   insulin glargine (BASAGLAR KWIKPEN) 100 UNIT/ML injection pen Inject 70 Units into the skin 2 times daily   Yes Historical Provider, MD   lidocaine-prilocaine (EMLA) 2.5-2.5 % cream Apply topically as needed for Pain Indications: Apply to dialysis site Apply topically as needed. Yes Historical Provider, MD   carvedilol (COREG) 3.125 MG tablet Take 3.125 mg by mouth 2 times daily   Yes Historical Provider, MD   traZODone (DESYREL) 50 MG tablet TAKE 1 & 1/2 (ONE & ONE-HALF) TABLETS BY MOUTH NIGHTLY 4/11/22   AFSANEH Auguste CNP   gabapentin (NEURONTIN) 300 MG capsule Take 1 capsule by mouth 2 times daily for 30 days.  4/11/22 5/11/22  AFSANEH Auguste CNP   acetaminophen (TYLENOL) 325 MG tablet Take 650 mg by mouth every 6 hours as needed for Pain    Historical Provider, MD   furosemide (LASIX) 80 MG tablet Take 1 tablet by mouth 2 times daily 3/8/22   AFSANEH Auguste CNP   sodium bicarbonate 650 MG tablet Take 1 tablet by mouth 3 times daily 3/2/22   [facility], MD   Insulin Pen Needle (EASY TOUCH PEN NEEDLES) 29G X 12MM MISC 1 each by Does not apply route daily 3/1/22   AFSANEH Auguste CNP   ReliOn Lancets Micro-Thin 33G MISC USE AS DIRECTED EVERY DAY 1/28/22   Historical Provider, MD   HUMALOG 100 UNIT/ML injection vial Inject into the skin 3 times daily (before meals) Indications: 15 units and sliding scale  11/29/21   Historical Provider, MD   Blood Glucose Monitoring Suppl (TRUE METRIX GO GLUCOSE METER) w/Device KIT 1 each by Does not apply route 4 times daily 11/30/21   AFSANEH Auguste CNP   Lancets MISC 1 each by Does not apply route daily 11/30/21   AFSANEH Auguste CNP   febuxostat (ULORIC) 40 MG TABS tablet Take 1 tablet by mouth daily 11/22/21   Jesse Byers MD   docusate sodium (COLACE) 100 MG capsule Take 1 capsule by mouth 2 times daily 11/22/21   Jesse Byers MD   tamsulosin [facility]) 0.4 MG capsule Take 1 capsule by mouth daily 11/23/21   Jesse Byers MD   atorvastatin (LIPITOR) 80 MG tablet Take 1 tablet by mouth daily 11/3/21   Ethelda Clock, APRN - CNP   aspirin 81 MG chewable tablet Take 1 tablet by mouth daily 9/25/21   Brandee Kramer MD   ranolazine (RANEXA) 1000 MG extended release tablet Take 1 tablet by mouth 2 times daily 9/25/21   Brandee Kramer MD   busPIRone (BUSPAR) 7.5 MG tablet Take 1 tablet by mouth 3 times daily 9/25/21   Brandee Kramer MD   pantoprazole (PROTONIX) 40 MG tablet Take 1 tablet by mouth every morning (before breakfast) 9/25/21   Brandee Kramer MD   allopurinol (ZYLOPRIM) 100 MG tablet Take 1 tablet by mouth daily 4/14/21   Ethelda Clock, APRN - CNP       Allergies:  Adhesive tape, Ace inhibitors, Iv dye [iodides], Nsaids, and Metformin and related    Social History:  The patient currently lives at home    TOBACCO:   reports that she has never smoked. She has never used smokeless tobacco.  ETOH:   reports no history of alcohol use.     Review of Systems - General ROS: positive for  - obesity  Psychological ROS: positive for - anxiety  Ophthalmic ROS: positive for - uses glasses  Endocrine ROS: positive for - hyperglycemia  Respiratory ROS: positive for - shortness of breath  Cardiovascular ROS: positive for - elevated BP  Gastrointestinal ROS: positive for - nausea/vomiting  Musculoskeletal ROS: positive for - joint stiffness      Family History:          Problem Relation Age of Onset    Diabetes Father     Heart Failure Father        PHYSICAL EXAM:    BP (!) 179/56   Pulse 74   Temp 98.6 °F (37 °C)   Resp 25   Ht 5' 5\" (1.651 m)   Wt 248 lb (112.5 kg)   SpO2 96%   BMI 41.27 kg/m²     General appearance: No apparent distress appears stated age and cooperative. Obese. HEENT Normal cephalic, atraumatic without obvious deformity. Pupils equal, round, and reactive to light. Extra ocular muscles intact. Conjunctivae/corneas clear. Neck: Supple, No jugular venous distention/bruits. Trachea midline without thyromegaly or adenopathy with full range of motion. Lungs: Clear to auscultation, bilaterally without Rales/Wheezes/Rhonchi with good respiratory effort. Heart: Regular rate and irregular rhythm with Normal S1/S2 without murmurs, rubs or gallops, point of maximum impulse non-displaced  Abdomen: Soft, non-tender or non-distended without rigidity or guarding and positive bowel sounds all four quadrants. Extremities: No clubbing, cyanosis, or edema bilaterally. Full range of motion without deformity and normal gait intact. Skin: Skin color, texture, turgor normal.  No rashes or lesions. Neurologic: Alert and oriented X 3, neurovascularly intact with sensory/motor intact upper extremities/lower extremities, bilaterally. Cranial nerves: II-XII intact, grossly non-focal.  Mental status: Alert, oriented, thought content appropriate. CXR:  I have reviewed the CXR with the following interpretation: pulmonary venous congestion  EKG:  I have reviewed the EKG with the following interpretation: normal sinus rhythm with sinus arrhythmia    CBC   Recent Labs     04/13/22  1535   WBC 5.8   HGB 11.8*   HCT 35.2*         RENAL  Recent Labs     04/13/22  1605 04/14/22  0536    141   K 3.3* 3.5*   CL 94* 102   CO2 18* 23   PHOS  --  2.2*   BUN 18 16   CREATININE 1.78* 1.68*     LFT'S  No results for input(s): AST, ALT, ALB, BILIDIR, BILITOT, ALKPHOS in the last 72 hours. COAG  No results for input(s): INR in the last 72 hours. CARDIAC ENZYMES  No results for input(s): CKTOTAL, CKMB, CKMBINDEX, TROPONINI in the last 72 hours.     U/A:    Lab Results   Component Value Date    COLORU Yellow 04/13/2022    WBCUA 10 TO 20 04/13/2022    RBCUA 10 TO 20 04/13/2022    MUCUS NOT REPORTED 11/14/2021    BACTERIA MANY 04/13/2022    SPECGRAV 1.026 04/13/2022    LEUKOCYTESUR SMALL 04/13/2022    GLUCOSEU LARGE 04/13/2022    AMORPHOUS NOT REPORTED 11/14/2021       ABG  No results found for: IRB8RTG, BEART, A2GVIVZJ, PHART, THGBART, PFN6ILC, PO2ART, MMV8PKA        Active Hospital Problems    Diagnosis Date Noted    DKA, type 2, not at goal [facility]) [E11.10] 04/13/2022    End-stage renal disease ([facility] [N18.6] 02/14/2022    Chronic midline low back pain with bilateral sciatica [M54.41, M54.42, G89.29] 09/30/2021    Long-term memory loss [R41.3] 09/30/2021    Anxiety [F41.9] 09/30/2021    Morbid obesity with BMI of 40.0-44.9, adult [facility] [E66.01, Z68.41] 09/14/2021    Chronic ischemic heart disease [I25.9] 07/23/2021    Essential hypertension [I10] 05/03/2021    Diabetic polyneuropathy associated with type 2 diabetes mellitus ([facility] [E11.42] 02/17/2020    Chronic diastolic heart failure ([facility] [I50.32] 09/20/2018    Type 2 diabetes mellitus with kidney complication, with long-term current use of insulin ([facility] [E11.29, Z79.4] 09/20/2018    Mixed hyperlipidemia [E78.2] 07/27/2016    Atherosclerosis of coronary artery bypass graft of native heart without angina pectoris [I25.810] 05/04/2015         ASSESSMENT/PLAN:  Patient admitted with DKA type 2. Co-morbidities as above. -DKA protocol initiated - on insulin drip and D5 @75ml/hr. -ESRD on dialysis - Nephrology consulted and managing. -CHF with fluid overload - Cardiology consulted.  -Acute cystitis without hematuria - on IV Rocephin.  -PT/OT to eval & treat for D/C planning.  -Home medications reconciled. -Continue current treatments.  -Complete orders per chart. DVT Prophylaxis:   Diet: ADULT DIET; Regular;  Low Sodium (2 gm); 1500 ml  Code Status: Full Code      Dispo - admitted to ICU       @[facility]@

## 2022-04-14 NOTE — PROGRESS NOTES
Physical Therapy        Physical Therapy Cancel Note      DATE: 2022    NAME: Freeman Montes De Oca  MRN: [id number]   : 1960      Patient not seen this date for Physical Therapy due to:    Pt sleeping upon entry- unable to wake at this time.  Will attempt to see pt again today or tomorrow as time permits      Electronically signed by Gerda Blanc PT on 2022 at 12:56 PM

## 2022-04-15 ENCOUNTER — APPOINTMENT (OUTPATIENT)
Dept: GENERAL RADIOLOGY | Age: 62
DRG: 637 | End: 2022-04-15
Payer: COMMERCIAL

## 2022-04-15 LAB
ABSOLUTE EOS #: 0.4 K/UL (ref 0–0.4)
ABSOLUTE LYMPH #: 0.9 K/UL (ref 1–4.8)
ABSOLUTE MONO #: 0.5 K/UL (ref 0.1–1.3)
ANION GAP SERPL CALCULATED.3IONS-SCNC: 16 MMOL/L (ref 9–17)
BASOPHILS # BLD: 1 % (ref 0–2)
BASOPHILS ABSOLUTE: 0 K/UL (ref 0–0.2)
BUN BLDV-MCNC: 20 MG/DL (ref 8–23)
CALCIUM SERPL-MCNC: 8.4 MG/DL (ref 8.6–10.4)
CHLORIDE BLD-SCNC: 102 MMOL/L (ref 98–107)
CO2: 22 MMOL/L (ref 20–31)
CREAT SERPL-MCNC: 2.63 MG/DL (ref 0.5–0.9)
EOSINOPHILS RELATIVE PERCENT: 5 % (ref 0–4)
GFR AFRICAN AMERICAN: 22 ML/MIN
GFR NON-AFRICAN AMERICAN: 18 ML/MIN
GFR SERPL CREATININE-BSD FRML MDRD: ABNORMAL ML/MIN/{1.73_M2}
GLUCOSE BLD-MCNC: 267 MG/DL (ref 65–105)
GLUCOSE BLD-MCNC: 295 MG/DL (ref 65–105)
GLUCOSE BLD-MCNC: 320 MG/DL (ref 70–99)
GLUCOSE BLD-MCNC: 326 MG/DL (ref 65–105)
GLUCOSE BLD-MCNC: 69 MG/DL (ref 65–105)
HCT VFR BLD CALC: 33.2 % (ref 36–46)
HEMOGLOBIN: 11 G/DL (ref 12–16)
LYMPHOCYTES # BLD: 12 % (ref 24–44)
MAGNESIUM: 1.9 MG/DL (ref 1.6–2.6)
MCH RBC QN AUTO: 31.2 PG (ref 26–34)
MCHC RBC AUTO-ENTMCNC: 33.2 G/DL (ref 31–37)
MCV RBC AUTO: 93.9 FL (ref 80–100)
MONOCYTES # BLD: 6 % (ref 1–7)
PDW BLD-RTO: 15.7 % (ref 11.5–14.9)
PHOSPHORUS: 3.5 MG/DL (ref 2.6–4.5)
PLATELET # BLD: 186 K/UL (ref 150–450)
PMV BLD AUTO: 9 FL (ref 6–12)
POTASSIUM SERPL-SCNC: 3.9 MMOL/L (ref 3.7–5.3)
RBC # BLD: 3.54 M/UL (ref 4–5.2)
SEG NEUTROPHILS: 76 % (ref 36–66)
SEGMENTED NEUTROPHILS ABSOLUTE COUNT: 5.5 K/UL (ref 1.3–9.1)
SODIUM BLD-SCNC: 140 MMOL/L (ref 135–144)
TROPONIN, HIGH SENSITIVITY: 73 NG/L (ref 0–14)
WBC # BLD: 7.3 K/UL (ref 3.5–11)

## 2022-04-15 PROCEDURE — 6360000002 HC RX W HCPCS: Performed by: INTERNAL MEDICINE

## 2022-04-15 PROCEDURE — 85025 COMPLETE CBC W/AUTO DIFF WBC: CPT

## 2022-04-15 PROCEDURE — 6370000000 HC RX 637 (ALT 250 FOR IP): Performed by: FAMILY MEDICINE

## 2022-04-15 PROCEDURE — 94761 N-INVAS EAR/PLS OXIMETRY MLT: CPT

## 2022-04-15 PROCEDURE — 82947 ASSAY GLUCOSE BLOOD QUANT: CPT

## 2022-04-15 PROCEDURE — 2060000000 HC ICU INTERMEDIATE R&B

## 2022-04-15 PROCEDURE — 2580000003 HC RX 258: Performed by: INTERNAL MEDICINE

## 2022-04-15 PROCEDURE — 80048 BASIC METABOLIC PNL TOTAL CA: CPT

## 2022-04-15 PROCEDURE — 2580000003 HC RX 258: Performed by: STUDENT IN AN ORGANIZED HEALTH CARE EDUCATION/TRAINING PROGRAM

## 2022-04-15 PROCEDURE — 6370000000 HC RX 637 (ALT 250 FOR IP): Performed by: INTERNAL MEDICINE

## 2022-04-15 PROCEDURE — 99232 SBSQ HOSP IP/OBS MODERATE 35: CPT | Performed by: FAMILY MEDICINE

## 2022-04-15 PROCEDURE — 6360000002 HC RX W HCPCS: Performed by: FAMILY MEDICINE

## 2022-04-15 PROCEDURE — 90935 HEMODIALYSIS ONE EVALUATION: CPT

## 2022-04-15 PROCEDURE — 36415 COLL VENOUS BLD VENIPUNCTURE: CPT

## 2022-04-15 PROCEDURE — 84100 ASSAY OF PHOSPHORUS: CPT

## 2022-04-15 PROCEDURE — 97530 THERAPEUTIC ACTIVITIES: CPT

## 2022-04-15 PROCEDURE — 71045 X-RAY EXAM CHEST 1 VIEW: CPT

## 2022-04-15 PROCEDURE — 97162 PT EVAL MOD COMPLEX 30 MIN: CPT

## 2022-04-15 PROCEDURE — 83735 ASSAY OF MAGNESIUM: CPT

## 2022-04-15 PROCEDURE — 84484 ASSAY OF TROPONIN QUANT: CPT

## 2022-04-15 RX ORDER — INSULIN GLARGINE 100 [IU]/ML
60 INJECTION, SOLUTION SUBCUTANEOUS 2 TIMES DAILY
Status: DISCONTINUED | OUTPATIENT
Start: 2022-04-15 | End: 2022-04-16

## 2022-04-15 RX ORDER — MIDODRINE HYDROCHLORIDE 5 MG/1
5 TABLET ORAL
Status: ACTIVE | OUTPATIENT
Start: 2022-04-15 | End: 2022-04-16

## 2022-04-15 RX ORDER — ALBUMIN (HUMAN) 12.5 G/50ML
25 SOLUTION INTRAVENOUS
Status: ACTIVE | OUTPATIENT
Start: 2022-04-15 | End: 2022-04-16

## 2022-04-15 RX ADMIN — INSULIN LISPRO 6 UNITS: 100 INJECTION, SOLUTION INTRAVENOUS; SUBCUTANEOUS at 21:12

## 2022-04-15 RX ADMIN — INSULIN LISPRO 15 UNITS: 100 INJECTION, SOLUTION INTRAVENOUS; SUBCUTANEOUS at 10:56

## 2022-04-15 RX ADMIN — INSULIN GLARGINE 50 UNITS: 100 INJECTION, SOLUTION SUBCUTANEOUS at 08:01

## 2022-04-15 RX ADMIN — INSULIN GLARGINE 60 UNITS: 100 INJECTION, SOLUTION SUBCUTANEOUS at 21:12

## 2022-04-15 RX ADMIN — ANTI-FUNGAL POWDER MICONAZOLE NITRATE TALC FREE: 1.42 POWDER TOPICAL at 09:32

## 2022-04-15 RX ADMIN — CEFTRIAXONE SODIUM 2000 MG: 2 INJECTION, POWDER, FOR SOLUTION INTRAMUSCULAR; INTRAVENOUS at 21:19

## 2022-04-15 RX ADMIN — SODIUM CHLORIDE, PRESERVATIVE FREE 10 ML: 5 INJECTION INTRAVENOUS at 09:00

## 2022-04-15 RX ADMIN — ASPIRIN 81 MG: 81 TABLET, CHEWABLE ORAL at 07:57

## 2022-04-15 RX ADMIN — SODIUM BICARBONATE 650 MG: 650 TABLET ORAL at 09:00

## 2022-04-15 RX ADMIN — GABAPENTIN 300 MG: 300 CAPSULE ORAL at 07:55

## 2022-04-15 RX ADMIN — BUSPIRONE HYDROCHLORIDE 7.5 MG: 5 TABLET ORAL at 21:13

## 2022-04-15 RX ADMIN — Medication 9 MG: at 21:13

## 2022-04-15 RX ADMIN — HEPARIN SODIUM 5000 UNITS: 5000 INJECTION INTRAVENOUS; SUBCUTANEOUS at 21:27

## 2022-04-15 RX ADMIN — DOCUSATE SODIUM 100 MG: 100 CAPSULE, LIQUID FILLED ORAL at 21:13

## 2022-04-15 RX ADMIN — INSULIN LISPRO 9 UNITS: 100 INJECTION, SOLUTION INTRAVENOUS; SUBCUTANEOUS at 10:57

## 2022-04-15 RX ADMIN — CARVEDILOL 3.12 MG: 3.12 TABLET, FILM COATED ORAL at 07:57

## 2022-04-15 RX ADMIN — DOCUSATE SODIUM 100 MG: 100 CAPSULE, LIQUID FILLED ORAL at 07:55

## 2022-04-15 RX ADMIN — HEPARIN SODIUM 5000 UNITS: 5000 INJECTION INTRAVENOUS; SUBCUTANEOUS at 08:00

## 2022-04-15 RX ADMIN — FUROSEMIDE 80 MG: 40 TABLET ORAL at 07:55

## 2022-04-15 RX ADMIN — TAMSULOSIN HYDROCHLORIDE 0.4 MG: 0.4 CAPSULE ORAL at 07:57

## 2022-04-15 RX ADMIN — ATORVASTATIN CALCIUM 80 MG: 80 TABLET, FILM COATED ORAL at 07:56

## 2022-04-15 RX ADMIN — FLUCONAZOLE 200 MG: 200 INJECTION, SOLUTION INTRAVENOUS at 11:02

## 2022-04-15 RX ADMIN — FEBUXOSTAT 40 MG: 40 TABLET, FILM COATED ORAL at 07:57

## 2022-04-15 RX ADMIN — INSULIN LISPRO 15 UNITS: 100 INJECTION, SOLUTION INTRAVENOUS; SUBCUTANEOUS at 08:02

## 2022-04-15 RX ADMIN — INSULIN LISPRO 8 UNITS: 100 INJECTION, SOLUTION INTRAVENOUS; SUBCUTANEOUS at 08:01

## 2022-04-15 RX ADMIN — PANTOPRAZOLE SODIUM 40 MG: 40 TABLET, DELAYED RELEASE ORAL at 07:57

## 2022-04-15 RX ADMIN — FUROSEMIDE 80 MG: 40 TABLET ORAL at 18:07

## 2022-04-15 RX ADMIN — BUSPIRONE HYDROCHLORIDE 7.5 MG: 5 TABLET ORAL at 07:56

## 2022-04-15 RX ADMIN — RANOLAZINE 1000 MG: 1000 TABLET, FILM COATED, EXTENDED RELEASE ORAL at 21:16

## 2022-04-15 RX ADMIN — SODIUM BICARBONATE 650 MG: 650 TABLET ORAL at 21:14

## 2022-04-15 RX ADMIN — TRAZODONE HYDROCHLORIDE 50 MG: 50 TABLET ORAL at 21:11

## 2022-04-15 RX ADMIN — GABAPENTIN 300 MG: 300 CAPSULE ORAL at 21:11

## 2022-04-15 RX ADMIN — SODIUM CHLORIDE, PRESERVATIVE FREE 10 ML: 5 INJECTION INTRAVENOUS at 21:26

## 2022-04-15 RX ADMIN — RANOLAZINE 1000 MG: 1000 TABLET, FILM COATED, EXTENDED RELEASE ORAL at 07:57

## 2022-04-15 ASSESSMENT — PAIN SCALES - GENERAL
PAINLEVEL_OUTOF10: 0

## 2022-04-15 NOTE — PROGRESS NOTES
ICU Progress Note (Non-Vent)  [facility]    Patient - Dominic Foss,  Age - 57 y.o.    - 1960      Room Number -    MRN -  [id number]   Acct # - [de-identified]  Date of Admission -  2022  3:17 PM    Events of Past 24 Hours   She feels better, off insulin drip getting physical therapy: On baseline of 3 L saturations are around 95% at rest    Vitals    height is 5' 5\" (1.651 m) and weight is 248 lb (112.5 kg). Her axillary temperature is 97 °F (36.1 °C). Her blood pressure is 128/34 (abnormal) and her pulse is 69. Her respiration is 14 and oxygen saturation is 98%.        Temperature Range: Temp: 97 °F (36.1 °C) Temp  Av.9 °F (36.6 °C)  Min: 97 °F (36.1 °C)  Max: 98.4 °F (36.9 °C)  BP Range:  Systolic (30REB), ZKD:972 , Min:86 , ZJF:155     Diastolic (57YRK), YNC:35, Min:26, Max:76    Pulse Range: Pulse  Av.1  Min: 60  Max: 72  Respiration Range: Resp  Av.9  Min: 14  Max: 28  Current Pulse Ox[de-identified]  SpO2: 98 %  24HR Pulse Ox Range:  SpO2  Av.2 %  Min: 89 %  Max: 100 %  Oxygen Amount and Delivery: O2 Flow Rate (L/min): 2 L/min    Wt Readings from Last 3 Encounters:   22 248 lb (112.5 kg)   22 248 lb (112.5 kg)   22 250 lb (113.4 kg)     I/O   No intake or output data in the 24 hours ending 04/15/22 1002  DRAIN/TUBE OUTPUT       Invasive Lines   ICP PRESSURE RANGE  No data recorded  CVP PRESSURE RANGE  No data recorded      Medications      sodium chloride flush  5-40 mL IntraVENous 2 times per day    aspirin  81 mg Oral Daily    ranolazine  1,000 mg Oral BID    busPIRone  7.5 mg Oral TID    pantoprazole  40 mg Oral QAM AC    atorvastatin  80 mg Oral Daily    febuxostat  40 mg Oral Daily    docusate sodium  100 mg Oral BID    tamsulosin  0.4 mg Oral Daily    insulin lispro  15 Units SubCUTAneous TID AC    sodium bicarbonate  650 mg Oral TID    furosemide  80 mg Oral BID    traZODone  50 mg Oral Nightly    gabapentin  300 mg Oral BID    melatonin  9 mg Oral Nightly    carvedilol  3.125 mg Oral BID    heparin (porcine)  5,000 Units SubCUTAneous 3 times per day    sodium phosphate IVPB  10 mmol IntraVENous Once    fluconazole  200 mg IntraVENous Q24H    cefTRIAXone (ROCEPHIN) IV  2,000 mg IntraVENous Q24H    insulin glargine  50 Units SubCUTAneous BID    insulin lispro  0-12 Units SubCUTAneous TID WC    insulin lispro  0-6 Units SubCUTAneous Nightly    miconazole   Topical BID     sodium chloride flush, sodium chloride, ondansetron **OR** ondansetron, acetaminophen, lidocaine-prilocaine, anticoagulant sodium citrate, anticoagulant sodium citrate, glucose, dextrose, glucagon (rDNA), acetaminophen  IV Drips/Infusions   sodium chloride         Diet/Nutrition   ADULT DIET; Regular; 4 carb choices (60 gm/meal); 1500 ml    Exam      Constitutional - Alert, arousable  General Appearance  well developed, well nourished morbidly obese  HEENT -normocephalic, atraumatic. PERRLA abnormal upper airway  Lungs - Chest expands equally, no wheezes, rales or rhonchi. Cardiovascular - Heart sounds are normal.  normal rate and rhythm regular, no murmur, gallop or rub. Abdomen - soft, nontender, nondistended, no masses or organomegaly  Neurologic - CN II-XII are grossly intact.  There are no focal motor deficits  Skin - no bruising or bleeding  Extremities - no cyanosis, clubbing or edema    Lab Results   CBC     Lab Results   Component Value Date    WBC 5.8 04/13/2022    RBC 3.79 04/13/2022    HGB 11.8 04/13/2022    HCT 35.2 04/13/2022     04/13/2022    MCV 92.9 04/13/2022    MCH 31.2 04/13/2022    MCHC 33.6 04/13/2022    RDW 15.4 04/13/2022    LYMPHOPCT 15 04/13/2022    LYMPHOPCT 23.1 04/09/2015    MONOPCT 6 04/13/2022    MONOPCT 4.6 04/09/2015    EOSPCT 6.7 04/09/2015    BASOPCT 1 04/13/2022    BASOPCT 0.7 04/09/2015    MONOSABS 0.40 04/13/2022 MONOSABS 0.4 04/09/2015    LYMPHSABS 0.90 04/13/2022    LYMPHSABS 1.8 04/09/2015    EOSABS 0.10 04/13/2022    EOSABS 0.5 04/09/2015    BASOSABS 0.10 04/13/2022    DIFFTYPE NOT REPORTED 02/16/2022       BMP   Lab Results   Component Value Date     04/14/2022    K 3.2 04/14/2022     04/14/2022    CO2 26 04/14/2022    BUN 16 04/14/2022    CREATININE 1.79 04/14/2022    GLUCOSE 168 04/14/2022       LFTS  Lab Results   Component Value Date    ALKPHOS 88 01/17/2022    ALT 9 01/17/2022    AST 11 01/17/2022    PROT 6.3 01/17/2022    BILITOT 0.37 01/17/2022    LABALBU 3.6 01/17/2022       ABG ABGs: No results found for: PHART, PO2ART, XUT0UJR    Lab Results   Component Value Date    MODE CONDITION NO LONGER WARRANTS 04/06/2021         INR  No results for input(s): PROTIME, INR in the last 72 hours. APTT  No results for input(s): APTT in the last 72 hours. Lactic Acid  No results found for: LACTA     BNP   No results for input(s): BNP in the last 72 hours.      Cultures       Radiology     CXR      CT Scans    (See actual reports for details)      SYSTEMS ASSESSMENT    Diabetic ketoacidosis  Right lower lobe infiltrate/effusion  UTI  Chronic hypoxic respiratory failure  End stage renal disease on dialysis  ERICK  (clinical diagnosis)  Morbid obesity    Off insulin drip, she normally takes 70 units of Lantus at home twice a day will increase to 60 units twice daily switch over to high sliding scale insulin  Awaiting results of urine cultures continue Diflucan and ceftriaxone  Continue oxygen, monitor her when she is at exercise  Follow-up chest x-ray to look at right lower lobe infiltrate  Add incentive spirometer  Transfer to stepdown      Critical Care Time   0 min    Electronically signed by Guillermo Weston MD on 4/15/2022 at 10:02 AM

## 2022-04-15 NOTE — PROGRESS NOTES
Treatment time: 150 minutes    Net UF: 1800 mL    Pre weight: 112.5 kg  Post weight: n/a  EDW: n/a    Access used: CVC right chest  Access function: good    Medications or blood products given: none    Regular outpatient schedule: MWF    Summary of response to treatment: Patient tolerated treatment fairly, treatment terminated 1/2 hour early due to system clotting, new dressing applied to catheter site, patient will have regular treatment tomorrow, report given to Teri Templeton RN. Copy of dialysis treatment record placed in chart, to be scanned into EMR.

## 2022-04-15 NOTE — PROGRESS NOTES
HEMODIALYSIS PRE-TREATMENT NOTE    Patient Identifiers prior to treatment: Name, , MRN    Isolation Required:  Yes                     Isolation Type: Contact Isolation         Hepatitis status:                           Date Drawn                             Result  Hepatitis B Surface Antigen 21      negative             Hepatitis B Surface Antibody 21 60 (patient immune)        Hepatitis B Core Antibody            How was Hepatitis Status verified: Hard copy of [facility] records       Hemodialysis orders verified: Yes    Access Within normal limits: Yes     Pre-Assessment completed: Yes    Pre-dialysis report received from: Felix Ansari RN                Time: 6135

## 2022-04-15 NOTE — PROGRESS NOTES
Physical Therapy    Facility/Department: [facility] ICU  Initial Assessment    NAME: Qasim Mathews  : 1960  MRN: [id number]    Date of Service: 4/15/2022    Discharge Recommendations:  Patient would benefit from continued therapy after discharge   PT Equipment Recommendations  Equipment Needed: No    Assessment   Body structures, Functions, Activity limitations: Decreased functional mobility ; Decreased strength;Decreased endurance;Decreased balance;Decreased posture  Assessment: Pt presents with decreased tolerance for activity this date 2* inc fatigue, inc anxiety, and inc nausea with activity limiting overall mobility. Pt requires SBA for bed mobility to manage lines, and CGA for transfers and short distance amb with RW. Pt would benefit from continued PT services to address deficits in strength, balance, posture, endurance, as well as to progress pt towards prior level of functional independence. Treatment Diagnosis: Impaired functional mobility and endurance 2* fatigue and nausea associated with Diabetic Ketoacidosis  Specific instructions for Next Treatment: Progress endurance and ambulation distance with Rollator (or RW)  Prognosis: Good  Decision Making: Medium Complexity  History: H/O CVA- was in ARU in Nov,Dec, 2021 and continued OP Therapy; CHF, Type 2 DM, Hemodialysis on /  Exam: ROM, MMT, Balance, and functional mobility assessments  Clinical Presentation: pt is pleasant and agreeable to participate; Still experiencing a feeling of \"restricted breathing\" but no pain in chest. In fatigue and nausea with activity.     REQUIRES PT FOLLOW UP: Yes  Activity Tolerance  Activity Tolerance: Patient limited by fatigue;Treatment limited secondary to medical complications (free text)  Activity Tolerance: Pt reporting inc fatigue and nausea with activity- c/o no chest pain but feels like \"Breathing is restricted\"        Patient Diagnosis(es): The primary encounter diagnosis was Diabetic ketoacidosis without coma associated with type 2 diabetes mellitus ([facility] [address] A diagnosis of Dyspnea, unspecified type was also pertinent to this visit. has a past medical history of Backache, unspecified, CHF (congestive heart failure) ([facility] [address] Chronic kidney disease, Coronary atherosclerosis of artery bypass graft, COVID, Cramp of limb, Gallstones, Hypertension, Insomnia, Pneumonia, Type II or unspecified type diabetes mellitus with renal manifestations, not stated as uncontrolled(250.40), Type II or unspecified type diabetes mellitus without mention of complication, not stated as uncontrolled, and Unspecified vitamin D deficiency. has a past surgical history that includes Coronary artery bypass graft; Knee arthroscopy; Carpal tunnel release; Breast surgery; Tonsillectomy; Hand surgery; Ankle fracture surgery; Cholecystectomy, open (N/A); IR TUNNELED CVC PLACE WO SQ PORT/PUMP > 5 YEARS (8/18/2021); AV fistula creation (12/14/2021); Dialysis fistula creation (Left, 12/14/2021); and other surgical history (04/06/2022). Restrictions  Restrictions/Precautions  Restrictions/Precautions: General Precautions,Fall Risk,Up as Tolerated  Required Braces or Orthoses?: No  Vision/Hearing  Vision: Impaired  Vision Exceptions: Wears glasses at all times  Hearing: Within functional limits     Subjective  General  Chart Reviewed: Yes  Patient assessed for rehabilitation services?: Yes  Additional Pertinent Hx: The patient is a 57 y.o. female who presents to [facility] with complaints of shortness of breath. She has a known hx of CHF, DM2 and ESRD on hemodialysis. She states that the SOB has been progressively worsening over the past few days. SOB was not relieved by Dialysis treatment.    Response To Previous Treatment: Not applicable  Family / Caregiver Present: No  Referring Practitioner: Dr. Claudene Slimmer  Referral Date : 04/14/22  Diagnosis: DKA  Follows Commands: Within Functional Limits  General Comment  Comments: Ok per [facility] to proceed with PT Eval assessments  Subjective  Subjective: pt is agreeable to assessments this date; pt states that she continues to feel restricted with her breathing and feels almost nauseous with inc activity. Pain Screening  Patient Currently in Pain: No (no Longer having chest pain; breaths still feels restricted)  Pain Assessment  Pain Assessment: 0-10  Vital Signs  Patient Currently in Pain: No (no Longer having chest pain; breaths still feels restricted)  Oxygen Therapy  SpO2: 95 %  Pulse Oximeter Device Mode: Continuous  Pulse Oximeter Device Location: Finger  O2 Device: Nasal cannula  O2 Flow Rate (L/min): 3 L/min  Patient Observation  Observations: Pt On bedside commode upon arrival; RN Camille Joe present in room at beginning and end of treatment. L HD Fistula, HD Access in R internal Jugular, Peripheral IV's R forearm and hand, Continuous vitals monitoring. Decreased tolerance for activity 2* inc fatigue, nausea. Orientation  Orientation  Overall Orientation Status: Within Functional Limits  Social/Functional History  Social/Functional History  Lives With: Family (Mother-pt is caregiver for her mom)  Type of Home: House ([facility])  Home Layout: One level  Home Access: Ramped entrance  Bathroom Shower/Tub: Walk-in shower  Bathroom Toilet: Handicap height  Bathroom Equipment: Grab bars in shower,Built-in shower seat  Bathroom Accessibility: [facility]: [facility]: [address]: Needs assistance (sisters visit to assist)  Homemaking Responsibilities: Yes  Ambulation Assistance: Independent (with 7FG)  Transfer Assistance: Independent  Active : No  Patient's  Info: famile - sister primarily  Mode of Transportation: Car  IADL Comments: pt sleeps in a flat bed  Additional Comments: Pt is the primary caregiver for her mother at home and sisters come to assist with caregiver needs and homecare.  Pt will have continued support from sisters upon d/c  Cognition        Objective          AROM RLE (degrees)  RLE AROM: WFL  AROM LLE (degrees)  LLE AROM : WFL  AROM RUE (degrees)  RUE AROM :  (See OT assessments)  AROM LUE (degrees)  LUE AROM :  (See OT assessments)  Strength RLE  Strength RLE: WFL  Comment: Grossly 4-/5  Strength LLE  Strength LLE: WFL  Comment: Grossly 4/5  Strength RUE  Strength RUE:  (See OT assessments)  Strength LUE  Strength LUE:  (See OT assessments)        Bed mobility  Bridging: Independent  Scooting: Modified independent (HOB flattened for ease; pt uses all 4 extremities to boost )  Transfers  Sit to Stand: Contact guard assistance  Stand to sit: Contact guard assistance  Bed to Chair: Contact guard assistance  Stand Pivot Transfers: Contact guard assistance  Comment: RW used for all transfers; Cues for safe hands placement with good carry over demo. Pt demo's standing tolerance x3-4 minutes during post-toileting hygeine - RN assists with Hygeine portion. Pt becomes noticeabley fatigued with standing for short duration. Ambulation  Ambulation?: Yes  Ambulation 1  Surface: level tile  Device: Rolling Walker  Assistance: Contact guard assistance  Quality of Gait: slight forward flexed posture- able to correct with cueing; steady but slow moving  Gait Deviations: Slow Annie;Decreased step length;Decreased step height  Distance: 5' fwd, 5' bckwrd  Comments: RW used for short distance amb; further distances deferred d/t inc faitgue and complaints of inc nausea with mobility. SpO2 remains in low 90's with activity and returns to mid 90's with rest. Cues for posture correction with good carry over. Stairs/Curb  Stairs?: No     Balance  Posture: Fair  Sitting - Static: Good  Sitting - Dynamic: Good  Standing - Static: Fair (RW)  Standing - Dynamic: Fair (RW)  Comments: Standing balance assess with RW; No LOB to report.         Plan   Plan  Times per week: 5-6 tx per week  Specific instructions for Next Treatment: Progress endurance and ambulation distance with Rollator (or RW)  Current Treatment Recommendations: Strengthening,Balance Training,Functional Mobility Training,Transfer Training,Endurance Training,Gait Training  Safety Devices  Type of devices: Call light within reach,Gait belt,Patient at risk for falls,Left in bed,Nurse notified (RN Melissa Coronado assists with toileting hygeine at beginning)    AM-PAC Score     AM-PAC Inpatient Mobility without Stair Climbing Raw Score : 15 (04/15/22 0950)  AM-PAC Inpatient without Stair Climbing T-Scale Score : 43.03 (04/15/22 0950)  Mobility Inpatient CMS 0-100% Score: 47.43 (04/15/22 0950)  Mobility Inpatient without Stair CMS G-Code Modifier : CK (04/15/22 0950)       Goals  Short term goals  Time Frame for Short term goals: 5 days  Short term goal 1: pt to demo independent bed mobility   Short term goal 2: pt to perform all Transfers with Rollator (or RW), Mod I  Short term goal 3: pt to ambulate household distances of 54-65' with Rollator (or RW) to improve independence and safety with mobility. Short term goal 4: pt to improve standing balance to good with Use of AD  Short term goal 5: pt to improve standing tolerance to 10 minutes to improve tolerance for daily activities and ADLs  Patient Goals   Patient goals : To return home safely.        Therapy Time   Individual Concurrent Group Co-treatment   Time In 0950         Time Out 1018         Minutes 28         Timed Code Treatment Minutes: 25 Minutes       Jorge Palafox, PT

## 2022-04-15 NOTE — PROGRESS NOTES
Nephrology Progress Note    Subjective/   57y.o. year old female who we are seeing in consultation for end-stage renal disease on hemodialysis          Interval history:  Patient complains of shortness of breath today. She has been out of DKA protocol since yesterday and anion gap is closed. Her potassium is 3.9 and improved  Patient had ultrafiltration treatment yesterday with 1.8 kg removed terminated early due to system clotting. Hemodynamically stable today. History of Present Illness: This is a 57 y.o. female with hypertension, type 2 diabetes mellitus, end-stage renal disease on hemodialysis   who was recently taken off dialysis about 6 weeks ago due to regaining residual kidney function. Over the course of the last 2 weeks patient  was restarted on hemodialysis due to worsening fluid overload. Initially had problems with her  tunneled catheter which was replaced last week- patient was dialyzed on Saturday, 4/9/2021 and yesterday-4/13/22  at the outpatient dialysis unit. Patient presented with complaints of shortness of breath over the last 2 to 3 days progressively worsening. Patient had associated  Nausea, chest pain but no vomiting. She is on 3 L nasal cannula chronically at home. Patient was found to be in acute DKA with blood sugars greater than 500 was started on the modified DKA protocol. Blood pressures on admission were elevated above 200s and her chest x-ray showed evidence of pulmonary vascular congestion. Labs showed a potassium of 3.3 with BUN/creatinine of 18 and 1.78 mg/dL, bicarbonate of 18 and troponin of 78. Beta hydroxybutyrate was 7.57.   Objective/     Vitals:    04/15/22 0700 04/15/22 0900 04/15/22 1020 04/15/22 1100   BP: (!) 128/34 (!) 121/33 (!) 111/39 (!) 110/31   Pulse: 69 69 63 66   Resp: 14 19 14 19   Temp:    97.9 °F (36.6 °C)   TempSrc:    Oral   SpO2: 98% 93% 95% 95%   Weight:       Height:         24HR INTAKE/OUTPUT:      Intake/Output Summary (Last 24 hours) at 4/15/2022 1128  Last data filed at 4/15/2022 1002  Gross per 24 hour   Intake 160 ml   Output --   Net 160 ml     Patient Vitals for the past 96 hrs (Last 3 readings):   Weight   04/13/22 1557 248 lb (112.5 kg)       Constitutional:  Alert, awake, no apparent distress  Cardiovascular:  S1, S2 without m/r/g  Respiratory:  CTA B without w/r/r  Abdomen: +bs, soft, nt  Ext: 2+ LE edema    Data/  Recent Labs     04/13/22  1535 04/15/22  1014   WBC 5.8 7.3   HGB 11.8* 11.0*   HCT 35.2* 33.2*   MCV 92.9 93.9    186     Recent Labs     04/14/22  0536 04/14/22  0956 04/14/22  1601    135 137   K 3.5* 2.7* 3.2*    98 100   CO2 23 25 26   GLUCOSE 201* 352* 168*   PHOS 2.2* 2.1* 4.9*   MG 2.1 1.9 2.0   BUN 16 16 16   CREATININE 1.68* 1.59* 1.79*   LABGLOM 31* 33* 29*   GFRAA 37* 40* 35*         Assessment/   1. End-stage renal disease on hemodialysis by way of a right-sided tunneled catheter, transiently taken off dialysis 6 weeks ago and re- started due to worsening fluid overload 1 week ago. Patient still makes urine proximately 400 to 500 cc daily     2. Hypertensive urgency- improving     3. Acute DKA currently on DKA protocol-resolved     4. Hypokalemia #2 to osmotic diuresis and  total body depletion-improved     5. Hypophosphatemia-resolved     6. CHF with diastolic dysfunction and evidence of fluid overload pulmonary vascular congestion on chest xray     7.  Urinary tract infection with pyuria         Plan/     We will plan for isolated ultrafiltration/HD  treatment today for volume overload  Follow the chest x-ray  Basic metabolic panel daily  Renew oral Lasix 80 mg twice daily  Subsequently will place on Monday Wednesday Friday.     Cathleen Hodge M.D, FASN  Nephrologist      Phu Smith MD

## 2022-04-15 NOTE — PROGRESS NOTES
HEMODIALYSIS POST TREATMENT NOTE    Treatment time ordered:180    Actual treatment time: 180    UltraFiltration Goal: 3700  UltraFiltration Removed: 2500      Pre Treatment weight: 112.5  Post Treatment weight: 110.0  Estimated Dry Weight: NA    Access used:     Central Venous Catheter:          Tunneled or Non-tunneled: TUNNELED           Site: RIGHT CHEST          Access Flow: GOOD      Internal Access:       AV Fistula or AV Graft: NA         Site: NA       Access Flow: NA       Sign and symptoms of infection: NONe       If YES: NA    Medications or blood products given: NA    Chronic outpatient schedule: [facility]    Chronic outpatient unit: [facility]    Summary of response to treatment: PT DID WELL    Explain if orders NOT met, was physician notified:trinh      ACES flowsheet faxed to patient unit/ placed in patient chart: yes    Post assessment completed: yes    Report given to: Renetta Chambers documented Safety Checks include the followin) Access and face visible at all times. 2) All connections and blood lines are secure with no kinks. 3) NVL alarm engaged. 4) Hemosafe device applied (if applicable). 5) No collapse of Arterial or Venous blood chambers. 6) All blood lines / pump segments in the air detectors.

## 2022-04-15 NOTE — CARE COORDINATION
ONGOING DISCHARGE PLAN:    Patient is alert and oriented x4. Spoke with patient regarding discharge plan and patient confirms that plan is still to return to home. Wants VNS. Per Carter Hess, from [address] [place] [address], they do NOT accept her Insurance. Maria Victoria from [place] following. Pt. Would like PT & possible HHA. Pt. Getting Dialysis today. Follows [facility] on [address], T/TH at [facility]. Remains on IV Rocephin/Diflucan. CXR paddy. Nephro/Pulm following. Transfer to PCU after Dialysis. Will continue to follow for additional discharge needs.     Electronically signed by Loni Foster RN on 4/15/2022 at 12:28 PM

## 2022-04-15 NOTE — PROGRESS NOTES
[facility] Cardiology Cardiology   Progress                       Today's Date: 4/15/2022  Patient Name: Qasim Mathews  Date of admission: 4/13/2022  3:17 PM  Patient's age: 57 y.o., 1960  Admission Dx: DKA, type 2, not at goal [facility]) [E11.10]  Diabetic ketoacidosis without coma associated with type 2 diabetes mellitus ([address] [E11.10]  Dyspnea, unspecified type [R06.00]    Subjective:  No CP. SOB improving. Sinus on monitor- HR 63.        Current Facility-Administered Medications:     sodium chloride flush 0.9 % injection 5-40 mL, 5-40 mL, IntraVENous, 2 times per day, Doy Blaze, DO, 10 mL at 04/14/22 2234    sodium chloride flush 0.9 % injection 5-40 mL, 5-40 mL, IntraVENous, PRN, Doy Blaze, DO    0.9 % sodium chloride infusion, , IntraVENous, PRN, Doy Blaze, DO    ondansetron (ZOFRAN-ODT) disintegrating tablet 4 mg, 4 mg, Oral, Q8H PRN **OR** ondansetron (ZOFRAN) injection 4 mg, 4 mg, IntraVENous, Q6H PRN, Doy Blaze, DO    aspirin chewable tablet 81 mg, 81 mg, Oral, Daily, Saúl Anderson MD, 81 mg at 04/15/22 0757    ranolazine (RANEXA) extended release tablet 1,000 mg, 1,000 mg, Oral, BID, Saúl Anderson MD, 1,000 mg at 04/15/22 0757    busPIRone (BUSPAR) tablet 7.5 mg, 7.5 mg, Oral, TID, Saúl Anderson MD, 7.5 mg at 04/15/22 0756    pantoprazole (PROTONIX) tablet 40 mg, 40 mg, Oral, QAM AC, Saúl Andreson MD, 40 mg at 04/15/22 0757    atorvastatin (LIPITOR) tablet 80 mg, 80 mg, Oral, Daily, Saúl Anderson MD, 80 mg at 04/15/22 0756    febuxostat (ULORIC) tablet 40 mg, 40 mg, Oral, Daily, Saúl Anderson MD, 40 mg at 04/15/22 0757    docusate sodium (COLACE) capsule 100 mg, 100 mg, Oral, BID, Saúl Anderson MD, 100 mg at 04/15/22 0755    tamsulosin (FLOMAX) capsule 0.4 mg, 0.4 mg, Oral, Daily, Saúl Anderson MD, 0.4 mg at 04/15/22 0757    insulin lispro (HUMALOG) injection vial 15 Units, 15 Units, SubCUTAneous, TID AC, Saúl Anderson MD, 15 Units at 04/15/22 0802    sodium bicarbonate tablet 650 mg, 650 mg, Oral, TID, Sarah Devries MD, 650 mg at 04/14/22 2226    furosemide (LASIX) tablet 80 mg, 80 mg, Oral, BID, Sarah Devries MD, 80 mg at 04/15/22 0755    acetaminophen (TYLENOL) tablet 650 mg, 650 mg, Oral, Q6H PRN, Sarah Devries MD    traZODone (DESYREL) tablet 50 mg, 50 mg, Oral, Nightly, Sarah Devries MD    gabapentin (NEURONTIN) capsule 300 mg, 300 mg, Oral, BID, Sarah Devries MD, 300 mg at 04/15/22 0755    melatonin tablet 9 mg, 9 mg, Oral, Nightly, Sarah Devries MD    lidocaine-prilocaine (EMLA) cream, , Topical, PRN, Sarah Devries MD    carvedilol (COREG) tablet 3.125 mg, 3.125 mg, Oral, BID, Sarah Devries MD, 3.125 mg at 04/15/22 0757    heparin (porcine) injection 5,000 Units, 5,000 Units, SubCUTAneous, 3 times per day, Sarah Devries MD, 5,000 Units at 04/15/22 0800    sodium phosphate 10 mmol in dextrose 5 % 250 mL IVPB, 10 mmol, IntraVENous, Once, Payton Sawyer MD    fluconazole (DIFLUCAN) in 0.9 % sodium chloride IVPB 200 mg, 200 mg, IntraVENous, Q24H, Yonatan Farmer MD, Last Rate: 100 mL/hr at 04/14/22 1434, 200 mg at 04/14/22 1434    cefTRIAXone (ROCEPHIN) 2000 mg IVPB in D5W 50ml minibag, 2,000 mg, IntraVENous, Q24H, Yonatan Farmer MD, Stopped at 04/14/22 2301    anticoagulant sodium citrate 4 % injection 1.6 mL, 1.6 mL, IntraCATHeter, PRN, Payton Sawyer MD, 1.6 mL at 04/14/22 1952    anticoagulant sodium citrate 4 % injection 1.6 mL, 1.6 mL, IntraCATHeter, PRN, Payton Sawyer MD, 1.6 mL at 04/14/22 1952    insulin glargine (LANTUS) injection vial 50 Units, 50 Units, SubCUTAneous, BID, Arby Pain, MD, 50 Units at 04/15/22 0801    insulin lispro (HUMALOG) injection vial 0-12 Units, 0-12 Units, SubCUTAneous, TID WC, Yonatan Farmer MD, 8 Units at 04/15/22 0801    insulin lispro (HUMALOG) injection vial 0-6 Units, 0-6 Units, SubCUTAneous, Nightly, Yonatan Farmer MD, 3 Units at 04/14/22 2231    glucose (GLUTOSE) 40 % oral gel 15 g, 15 g, Oral, PRN, Noah Gomes MD    dextrose 50 % IV solution, 12.5 g, IntraVENous, PRN, Noah Gomes MD    glucagon (rDNA) injection 1 mg, 1 mg, IntraMUSCular, PRN, Noah Gomes MD    miconazole (MICOTIN) 2 % powder, , Topical, BID, Noah Gomes MD, Given at 04/14/22 2234    acetaminophen (TYLENOL) tablet 650 mg, 650 mg, Oral, Q4H PRN, Mohan Gum, DO    PHYSICAL EXAM:      BP (!) 128/34   Pulse 69   Temp 97 °F (36.1 °C) (Axillary)   Resp 14   Ht 5' 5\" (1.651 m)   Wt 248 lb (112.5 kg)   SpO2 98%   BMI 41.27 kg/m²    Constitutional and General Appearance: alert, cooperative, no distress and appears stated age  HEENT: PERRL, no cervical lymphadenopathy. No masses palpable. Normal oral mucosa  Respiratory:  · Normal excursion and expansion without use of accessory muscles  · Resp Auscultation: Diminished breath sounds throughout, mild SOB at rest with conversation but difficult to assess b/c she keeps falling asleep  · NC without distress  Cardiovascular:  · Heart tones are crisp and normal. regular S1 and S2. SR with frequent PACs  · Jugular venous pulsation Normal  Abdomen:   · soft  · Bowel sounds present  · morbidly obese  Extremities:  ·  no LE edema  Neurological:  · Alert and oriented but sleepy      DATA:    Diagnostics:    EKG: normal sinus rhythm, nonspecific ST and T waves changes. ECHO: obtained and reviewed. Ejection fraction: 55%  Stress Test: previously taken 4/2021 and show as below. Cardiac Angiography: previously taken 2019 and show as below. PMH from office  1. Hypertension  2. Dyslipidemia  3. DVT on Eliquis. 4. Diabetes mellitus type 2.  5. Chronic diastolic heart failure. 6. CKD. 7. Syncope. 8. Coronary artery disease status post CABG x3.  9. Cardiac catheterization on 10/19 revealed patent 3 of 3 grafts with severe OM disease distal to the anastomosis that was not amenable to PCI.   10. 2D echo on 12/20 revealed EF of 60-65% with mild MR and TR  11. Nuclear stress test on 04/21 revealed lateral infarction with stalin-infarct ischemia affecting 13% of the myocardium, ejection fraction 47%, hypokinesis of the inferior septal wall. Medically treated. OV note Nay 5/6/2021  Plan Notes  1. Recent syncope x2. Based on symptomatology is most likely orthostatic hypotension due to hypovolemia.  -change Bumex to 1 mg p.o. q.day  -change Coreg 3.125 mg p.o. b.i.d.  -if continues to feel dizzy will recommend midodrine. 2. Coronary artery disease status post CABG x3. Patent grafts on 10/19 with severe OM disease distal to the anastomosis not amenable to PCI. -reviewed nuclear stress test from 04/21 which revealed lateral infarction with stalin-infarct ischemia-medically treated  -increase Ranexa 1000 mg p.o. b.i.d.  -continue aspirin, Eliquis, Imdur, statin, beta-blocker  3. Chronic diastolic heart failure. Non exacerbation. Review 2D echo from 12/20 which revealed EF of 60-65%. -see plan 1.  4. History of DVT. On Eliquis. 5. Dyslipidemia. On a statin. 6. Essential hypertension. Currently hypotensive. See plan 1.  7. CKD. Following with Nephrology. Echo 4/14/22  Summary  Left ventricle is normal in size. Moderate left ventricular hypertrophy. Global left ventricular systolic function is normal. Estimated LV EF >55 %. No obvious wall motion abnormality seen. Evidence of mild (grade I)  diastolic dysfunction. Left atrium is normal in size. Mild tricuspid regurgitation. No pulmonary hypertension. Estimated right ventricular systolic pressure is  [id number]. Labs:     CBC:   Recent Labs     04/13/22  1535   WBC 5.8   HGB 11.8*   HCT 35.2*        BMP:   Recent Labs     04/14/22  0956 04/14/22  1601    137   K 2.7* 3.2*   CO2 25 26   BUN 16 16   CREATININE 1.59* 1.79*   LABGLOM 33* 29*   GLUCOSE 352* 168*     BNP: No results for input(s): BNP in the last 72 hours.   PT/INR: No results for input(s): PROTIME, INR in the last 72 hours. APTT:No results for input(s): APTT in the last 72 hours. CARDIAC ENZYMES:No results for input(s): CKTOTAL, CKMB, CKMBINDEX, TROPONINI in the last 72 hours. FASTING LIPID PANEL:  Lab Results   Component Value Date    HDL 43 04/09/2015    LDLCALC 28 04/09/2015    TRIG 168 04/09/2015     LIVER PROFILE:No results for input(s): AST, ALT, LABALBU in the last 72 hours. IMPRESSION:    1. DKA  2. Acute on chronic diastolic CHF exacerbation  3. Trop elevation, demand ischemia secondary to DKA, CHF, & AURELIA  4. AURELIA on CKD  5. Hx of CAD with prior CABG  6. Morbid obesity with BMI >40  7. Hx of DVT - on Eliquis  8. HTN  9. HLP  10. Low risk Echo on 4/14/21    RECOMMENDATIONS:  1. Change back to eliquis if no objection- defer to primary team  2. Nephrology is managing volume status- getting UF per nephro recs  3. Will follow from a distance  4. Please call with questions  5. Follow up in 2 weeks. Discussed with patient in detail at bedside. All questions answered. Thank you for allowing me to participate in the care of this patient, please do not hesitate to call if you have any questions. Reema Henriquez DO, [facility] [place], [address], [address], [place] [address] [facility]  [facility]  [phone]

## 2022-04-15 NOTE — PROGRESS NOTES
FAMILY MEDICINE  - PROGRESS NOTE    Date:  4/15/2022  Ranjith Hill  [id number]      Chief Complaint   Patient presents with    Shortness of Breath         Interval History:  Improved, she is feeling a little better and her vitals have improved. No significant events overnight. Specialists notes, labs & imaging reviewed.       Subjective  Constitutional: positive for obesity  Respiratory: positive for shortness of breath  Cardiovascular: positive for lower extremity edema  Musculoskeletal:positive for arthralgias and myalgias  Behavioral/Psych: positive for anxiety and depression  Endocrine: positive for diabetic symptoms including hyperglycemia:    Objective:    BP (!) 132/36   Pulse 67   Temp 97 °F (36.1 °C) (Axillary)   Resp 21   Ht 5' 5\" (1.651 m)   Wt 248 lb (112.5 kg)   SpO2 97%   BMI 41.27 kg/m²   General appearance - alert, well appearing, and in no distress and overweight  Mental status - alert, oriented to person, place, and time  Eyes - pupils equal and reactive, extraocular eye movements intact  Ears - hearing grossly normal bilaterally  Nose - normal and patent, no erythema, discharge or polyps  Mouth - mucous membranes moist, pharynx normal without lesions  Neck - supple, no significant adenopathy  Lymphatics - no palpable lymphadenopathy, no hepatosplenomegaly  Chest - clear to auscultation, no wheezes, rales or rhonchi, symmetric air entry, decreased air entry noted posteriorly  Heart - normal rate, regular rhythm, normal S1, S2, no murmurs, rubs, clicks or gallops  Abdomen - soft, nontender, nondistended, no masses or organomegaly  Breasts - not examined  Back exam - not examined  Neurological - alert, oriented, normal speech, no focal findings or movement disorder noted  Musculoskeletal - no joint tenderness, deformity or swelling  Extremities - pedal edema 1 +  Skin - normal coloration and turgor, no rashes, no suspicious skin lesions noted    Data:   Medications:   Current Facility-Administered Medications   Medication Dose Route Frequency Provider Last Rate Last Admin    sodium chloride flush 0.9 % injection 5-40 mL  5-40 mL IntraVENous 2 times per day Karen [place] Townsend, DO   10 mL at 04/14/22 2234    sodium chloride flush 0.9 % injection 5-40 mL  5-40 mL IntraVENous PRN Karen [place], DO        0.9 % sodium chloride infusion   IntraVENous PRN Karen [place]send, DO        ondansetron (ZOFRAN-ODT) disintegrating tablet 4 mg  4 mg Oral Q8H PRN Karen [place]send, DO        Or    [facility]ron [facility]) injection 4 mg  4 mg IntraVENous Q6H PRN Karen [place] Townsend, DO        aspirin chewable tablet 81 mg  81 mg Oral Daily Radha Stanley MD   81 mg at 04/14/22 0915    ranolazine (RANEXA) extended release tablet 1,000 mg  1,000 mg Oral BID Radha Stanley MD   1,000 mg at 04/14/22 2234    busPIRone (BUSPAR) tablet 7.5 mg  7.5 mg Oral TID Radha Stanley MD   7.5 mg at 04/14/22 2226    pantoprazole (PROTONIX) tablet 40 mg  40 mg Oral QAM AC Radha Stanley MD   40 mg at 04/14/22 0700    atorvastatin (LIPITOR) tablet 80 mg  80 mg Oral Daily Radha Stanley MD   80 mg at 04/14/22 0915    febuxostat (ULORIC) tablet 40 mg  40 mg Oral Daily Radha Stanley MD   40 mg at 04/14/22 0900    docusate sodium (COLACE) capsule 100 mg  100 mg Oral BID Radha Stanley MD   100 mg at 04/14/22 2226    tamsulosin (FLOMAX) capsule 0.4 mg  0.4 mg Oral Daily Radha Stanley MD   0.4 mg at 04/14/22 0915    insulin lispro (HUMALOG) injection vial 15 Units  15 Units SubCUTAneous TID AC Radha Stanley MD        sodium bicarbonate tablet 650 mg  650 mg Oral TID Radha Stanley MD   650 mg at 04/14/22 2226    furosemide (LASIX) tablet 80 mg  80 mg Oral BID Radha Stanley MD   80 mg at 04/14/22 0900    acetaminophen (TYLENOL) tablet 650 mg  650 mg Oral Q6H PRN Radha Stanley MD        traZODone (DESYREL) tablet 50 mg  50 mg Oral Nightly Radha Stanley MD  gabapentin (NEURONTIN) capsule 300 mg  300 mg Oral BID Marta Rossi MD   300 mg at 04/14/22 0915    melatonin tablet 9 mg  9 mg Oral Nightly Marta Rossi MD        lidocaine-prilocaine (EMLA) cream   Topical PRN Marta Rossi MD        carvedilol (COREG) tablet 3.125 mg  3.125 mg Oral BID Marta Rossi MD   3.125 mg at 04/14/22 2225    heparin (porcine) injection 5,000 Units  5,000 Units SubCUTAneous 3 times per day Marta Rossi MD   5,000 Units at 04/14/22 2232    sodium phosphate 10 mmol in dextrose 5 % 250 mL IVPB  10 mmol IntraVENous Once Lizandro Valdes MD        fluconazole (DIFLUCAN) in 0.9 % sodium chloride IVPB 200 mg  200 mg IntraVENous Q24H Flex Harper  mL/hr at 04/14/22 1434 200 mg at 04/14/22 1434    cefTRIAXone (ROCEPHIN) 2000 mg IVPB in D5W 50ml minibag  2,000 mg IntraVENous Q24H Flex Harper MD   Stopped at 04/14/22 2301    anticoagulant sodium citrate 4 % injection 1.6 mL  1.6 mL IntraCATHeter PRN Lizandro Valdes MD   1.6 mL at 04/14/22 1952    anticoagulant sodium citrate 4 % injection 1.6 mL  1.6 mL IntraCATHeter PRN Lizandro Valdes MD   1.6 mL at 04/14/22 1952    insulin glargine (LANTUS) injection vial 50 Units  50 Units SubCUTAneous BID Flex Harper MD   50 Units at 04/14/22 1920    insulin lispro (HUMALOG) injection vial 0-12 Units  0-12 Units SubCUTAneous TID WC Flex Harper MD        insulin lispro (HUMALOG) injection vial 0-6 Units  0-6 Units SubCUTAneous Nightly Flex Harper MD   3 Units at 04/14/22 2231    glucose (GLUTOSE) 40 % oral gel 15 g  15 g Oral PRN Marta Rossi MD        dextrose 50 % IV solution  12.5 g IntraVENous PRN Marta Rossi MD        glucagon (rDNA) injection 1 mg  1 mg IntraMUSCular PRN Marta Rossi MD        miconazole (MICOTIN) 2 % powder   Topical BID Marta Rossi MD   Given at 04/14/22 2234    acetaminophen (TYLENOL) tablet 650 mg  650 mg Oral Q4H PRN Vlad Kincaid DO           Intake/Output Summary (Last 24 hours) at 4/15/2022 0610  Last data filed at 4/14/2022 1000  Gross per 24 hour   Intake --   Output 400 ml   Net -400 ml     Recent Results (from the past 24 hour(s))   POC Glucose Fingerstick    Collection Time: 04/14/22  6:32 AM   Result Value Ref Range    POC Glucose 193 (H) 65 - 105 mg/dL   POC Glucose Fingerstick    Collection Time: 04/14/22  7:21 AM   Result Value Ref Range    POC Glucose 192 (H) 65 - 105 mg/dL   POC Glucose Fingerstick    Collection Time: 04/14/22  8:13 AM   Result Value Ref Range    POC Glucose 187 (H) 65 - 105 mg/dL   SPECIMEN REJECTION    Collection Time: 04/14/22  8:53 AM   Result Value Ref Range    Specimen Source BLOOD     Ordered Test BMP,MG,TERRENCE     Reason for Rejection Unable to perform testing: Specimen hemolyzed.     POC Glucose Fingerstick    Collection Time: 04/14/22  8:55 AM   Result Value Ref Range    POC Glucose 314 (H) 65 - 105 mg/dL   Basic Metabolic Panel    Collection Time: 04/14/22  9:56 AM   Result Value Ref Range    Glucose 352 (H) 70 - 99 mg/dL    BUN 16 8 - 23 mg/dL    CREATININE 1.59 (H) 0.50 - 0.90 mg/dL    Calcium 8.2 (L) 8.6 - 10.4 mg/dL    Sodium 135 135 - 144 mmol/L    Potassium 2.7 (LL) 3.7 - 5.3 mmol/L    Chloride 98 98 - 107 mmol/L    CO2 25 20 - 31 mmol/L    Anion Gap 12 9 - 17 mmol/L    GFR Non-African American 33 (L) >60 mL/min    GFR African American 40 (L) >60 mL/min    GFR Comment         Magnesium    Collection Time: 04/14/22  9:56 AM   Result Value Ref Range    Magnesium 1.9 1.6 - 2.6 mg/dL   Phosphorus    Collection Time: 04/14/22  9:56 AM   Result Value Ref Range    Phosphorus 2.1 (L) 2.6 - 4.5 mg/dL   Beta-Hydroxybutyrate    Collection Time: 04/14/22  9:56 AM   Result Value Ref Range    Beta-Hydroxybutyrate 0.87 (H) 0.02 - 0.27 mmol/L   POC Glucose Fingerstick    Collection Time: 04/14/22 10:23 AM   Result Value Ref Range    POC Glucose 237 (H) 65 - 105 mg/dL   POC Glucose Fingerstick Collection Time: 04/14/22 11:26 AM   Result Value Ref Range    POC Glucose 262 (H) 65 - 105 mg/dL   POC Glucose Fingerstick    Collection Time: 04/14/22  1:27 PM   Result Value Ref Range    POC Glucose 148 (H) 65 - 105 mg/dL   POC Glucose Fingerstick    Collection Time: 04/14/22  2:33 PM   Result Value Ref Range    POC Glucose 131 (H) 65 - 105 mg/dL   POC Glucose Fingerstick    Collection Time: 04/14/22  3:34 PM   Result Value Ref Range    POC Glucose 133 (H) 65 - 105 mg/dL   Phosphorus    Collection Time: 04/14/22  4:01 PM   Result Value Ref Range    Phosphorus 4.9 (H) 2.6 - 4.5 mg/dL   Magnesium    Collection Time: 04/14/22  4:01 PM   Result Value Ref Range    Magnesium 2.0 1.6 - 2.6 mg/dL   Basic Metabolic Panel    Collection Time: 04/14/22  4:01 PM   Result Value Ref Range    Glucose 168 (H) 70 - 99 mg/dL    BUN 16 8 - 23 mg/dL    CREATININE 1.79 (H) 0.50 - 0.90 mg/dL    Calcium 8.1 (L) 8.6 - 10.4 mg/dL    Sodium 137 135 - 144 mmol/L    Potassium 3.2 (L) 3.7 - 5.3 mmol/L    Chloride 100 98 - 107 mmol/L    CO2 26 20 - 31 mmol/L    Anion Gap 11 9 - 17 mmol/L    GFR Non-African American 29 (L) >60 mL/min    GFR  35 (L) >60 mL/min    GFR Comment         POC Glucose Fingerstick    Collection Time: 04/14/22  7:19 PM   Result Value Ref Range    POC Glucose 184 (H) 65 - 105 mg/dL   POC Glucose Fingerstick    Collection Time: 04/14/22  8:58 PM   Result Value Ref Range    POC Glucose 267 (H) 65 - 105 mg/dL   POC Glucose Fingerstick    Collection Time: 04/14/22 10:08 PM   Result Value Ref Range    POC Glucose 274 (H) 65 - 105 mg/dL     -----------------------------------------------------------------  RAD:  EKG:  Micro:     Assessment & Plan:    Patient Active Problem List:     Atherosclerosis of coronary artery bypass graft of native heart without angina pectoris     Chronic diastolic heart failure (HCC)     Diabetic polyneuropathy associated with type 2 diabetes mellitus (HCC)     History of coronary artery bypass graft     Iron deficiency anemia     Spinal stenosis of lumbar region with neurogenic claudication     Mixed hyperlipidemia     Type 2 diabetes mellitus with kidney complication, with long-term current use of insulin (HCC)     Thyroid nodule greater than or equal to 1 cm in diameter incidentally noted on imaging study     Essential hypertension     Chronic ischemic heart disease     Ischemic stroke of frontal lobe (HCC)     Morbid obesity with BMI of 40.0-44.9, adult (HCC)     Disequilibrium syndrome     Generalized weakness     Long-term memory loss     Muscle right arm weakness     Anxiety     Chronic midline low back pain with bilateral sciatica     Tremor     COVID     End-stage renal disease ([facility] [address]     DKA, type 2, not at goal [facility])     Hypokalemia           Plan:  -DKA - improved, off of insulin drip, regular Lantus dose resumed, SS coverage and carb control diet ordered.  -Chronic hypoxic respiratory failure - on continuous oxygen per NC, Pulmonology managing. -ESRD on dialysis - Nephrology managing.  -Acute on chronic CHF exacerbation - volume status being managed by Nephrology. -UTI - on Rocephin, awaiting cx results. -RLL infiltrate -repeat CXR, await result.  -Okay to transfer to [facility] if okay with others.  -Complete orders per chart.     See orders   Disposition:    Electronically signed by Yolanda Brunner MD on 4/15/2022 at 6:10 AM

## 2022-04-16 LAB
ANION GAP SERPL CALCULATED.3IONS-SCNC: 12 MMOL/L (ref 9–17)
BUN BLDV-MCNC: 18 MG/DL (ref 8–23)
CALCIUM SERPL-MCNC: 8.6 MG/DL (ref 8.6–10.4)
CHLORIDE BLD-SCNC: 100 MMOL/L (ref 98–107)
CO2: 24 MMOL/L (ref 20–31)
CREAT SERPL-MCNC: 2.63 MG/DL (ref 0.5–0.9)
GFR AFRICAN AMERICAN: 22 ML/MIN
GFR NON-AFRICAN AMERICAN: 18 ML/MIN
GFR SERPL CREATININE-BSD FRML MDRD: ABNORMAL ML/MIN/{1.73_M2}
GLUCOSE BLD-MCNC: 166 MG/DL (ref 65–105)
GLUCOSE BLD-MCNC: 168 MG/DL (ref 65–105)
GLUCOSE BLD-MCNC: 174 MG/DL (ref 65–105)
GLUCOSE BLD-MCNC: 187 MG/DL (ref 65–105)
GLUCOSE BLD-MCNC: 300 MG/DL (ref 65–105)
GLUCOSE BLD-MCNC: 312 MG/DL (ref 65–105)
GLUCOSE BLD-MCNC: 364 MG/DL (ref 70–99)
POTASSIUM SERPL-SCNC: 4 MMOL/L (ref 3.7–5.3)
SODIUM BLD-SCNC: 136 MMOL/L (ref 135–144)

## 2022-04-16 PROCEDURE — 6360000002 HC RX W HCPCS: Performed by: FAMILY MEDICINE

## 2022-04-16 PROCEDURE — 2580000003 HC RX 258: Performed by: STUDENT IN AN ORGANIZED HEALTH CARE EDUCATION/TRAINING PROGRAM

## 2022-04-16 PROCEDURE — 6370000000 HC RX 637 (ALT 250 FOR IP): Performed by: STUDENT IN AN ORGANIZED HEALTH CARE EDUCATION/TRAINING PROGRAM

## 2022-04-16 PROCEDURE — 90935 HEMODIALYSIS ONE EVALUATION: CPT

## 2022-04-16 PROCEDURE — 2500000003 HC RX 250 WO HCPCS: Performed by: INTERNAL MEDICINE

## 2022-04-16 PROCEDURE — 2580000003 HC RX 258: Performed by: INTERNAL MEDICINE

## 2022-04-16 PROCEDURE — 6370000000 HC RX 637 (ALT 250 FOR IP): Performed by: FAMILY MEDICINE

## 2022-04-16 PROCEDURE — 99232 SBSQ HOSP IP/OBS MODERATE 35: CPT | Performed by: FAMILY MEDICINE

## 2022-04-16 PROCEDURE — 80048 BASIC METABOLIC PNL TOTAL CA: CPT

## 2022-04-16 PROCEDURE — 36415 COLL VENOUS BLD VENIPUNCTURE: CPT

## 2022-04-16 PROCEDURE — 6370000000 HC RX 637 (ALT 250 FOR IP): Performed by: INTERNAL MEDICINE

## 2022-04-16 PROCEDURE — 2060000000 HC ICU INTERMEDIATE R&B

## 2022-04-16 PROCEDURE — 6360000002 HC RX W HCPCS: Performed by: INTERNAL MEDICINE

## 2022-04-16 RX ORDER — INSULIN GLARGINE 100 [IU]/ML
70 INJECTION, SOLUTION SUBCUTANEOUS 2 TIMES DAILY
Status: DISCONTINUED | OUTPATIENT
Start: 2022-04-16 | End: 2022-04-19

## 2022-04-16 RX ADMIN — INSULIN LISPRO 3 UNITS: 100 INJECTION, SOLUTION INTRAVENOUS; SUBCUTANEOUS at 12:27

## 2022-04-16 RX ADMIN — FLUCONAZOLE 200 MG: 200 INJECTION, SOLUTION INTRAVENOUS at 17:19

## 2022-04-16 RX ADMIN — INSULIN LISPRO 3 UNITS: 100 INJECTION, SOLUTION INTRAVENOUS; SUBCUTANEOUS at 17:15

## 2022-04-16 RX ADMIN — Medication 9 MG: at 21:33

## 2022-04-16 RX ADMIN — GABAPENTIN 300 MG: 300 CAPSULE ORAL at 21:33

## 2022-04-16 RX ADMIN — CEFTRIAXONE SODIUM 2000 MG: 2 INJECTION, POWDER, FOR SOLUTION INTRAMUSCULAR; INTRAVENOUS at 21:47

## 2022-04-16 RX ADMIN — INSULIN LISPRO 12 UNITS: 100 INJECTION, SOLUTION INTRAVENOUS; SUBCUTANEOUS at 08:37

## 2022-04-16 RX ADMIN — RANOLAZINE 1000 MG: 1000 TABLET, FILM COATED, EXTENDED RELEASE ORAL at 21:41

## 2022-04-16 RX ADMIN — DOCUSATE SODIUM 100 MG: 100 CAPSULE, LIQUID FILLED ORAL at 08:34

## 2022-04-16 RX ADMIN — ACETAMINOPHEN 650 MG: 325 TABLET ORAL at 07:13

## 2022-04-16 RX ADMIN — GABAPENTIN 300 MG: 300 CAPSULE ORAL at 08:34

## 2022-04-16 RX ADMIN — ACETAMINOPHEN 650 MG: 325 TABLET ORAL at 01:52

## 2022-04-16 RX ADMIN — TRAZODONE HYDROCHLORIDE 50 MG: 50 TABLET ORAL at 21:33

## 2022-04-16 RX ADMIN — SODIUM CHLORIDE, PRESERVATIVE FREE 10 ML: 5 INJECTION INTRAVENOUS at 21:40

## 2022-04-16 RX ADMIN — CARVEDILOL 3.12 MG: 3.12 TABLET, FILM COATED ORAL at 08:35

## 2022-04-16 RX ADMIN — PANTOPRAZOLE SODIUM 40 MG: 40 TABLET, DELAYED RELEASE ORAL at 08:35

## 2022-04-16 RX ADMIN — INSULIN GLARGINE 70 UNITS: 100 INJECTION, SOLUTION SUBCUTANEOUS at 21:31

## 2022-04-16 RX ADMIN — BUSPIRONE HYDROCHLORIDE 7.5 MG: 5 TABLET ORAL at 08:35

## 2022-04-16 RX ADMIN — TAMSULOSIN HYDROCHLORIDE 0.4 MG: 0.4 CAPSULE ORAL at 08:35

## 2022-04-16 RX ADMIN — SODIUM BICARBONATE 650 MG: 650 TABLET ORAL at 21:33

## 2022-04-16 RX ADMIN — Medication 1.6 ML: at 15:46

## 2022-04-16 RX ADMIN — ASPIRIN 81 MG: 81 TABLET, CHEWABLE ORAL at 08:35

## 2022-04-16 RX ADMIN — INSULIN LISPRO 15 UNITS: 100 INJECTION, SOLUTION INTRAVENOUS; SUBCUTANEOUS at 08:35

## 2022-04-16 RX ADMIN — FEBUXOSTAT 40 MG: 40 TABLET, FILM COATED ORAL at 08:34

## 2022-04-16 RX ADMIN — HEPARIN SODIUM 5000 UNITS: 5000 INJECTION INTRAVENOUS; SUBCUTANEOUS at 21:33

## 2022-04-16 RX ADMIN — RANOLAZINE 1000 MG: 1000 TABLET, FILM COATED, EXTENDED RELEASE ORAL at 08:35

## 2022-04-16 RX ADMIN — INSULIN LISPRO 2 UNITS: 100 INJECTION, SOLUTION INTRAVENOUS; SUBCUTANEOUS at 21:32

## 2022-04-16 RX ADMIN — SODIUM BICARBONATE 650 MG: 650 TABLET ORAL at 08:35

## 2022-04-16 RX ADMIN — ANTI-FUNGAL POWDER MICONAZOLE NITRATE TALC FREE: 1.42 POWDER TOPICAL at 21:34

## 2022-04-16 RX ADMIN — DOCUSATE SODIUM 100 MG: 100 CAPSULE, LIQUID FILLED ORAL at 21:34

## 2022-04-16 RX ADMIN — HEPARIN SODIUM 5000 UNITS: 5000 INJECTION INTRAVENOUS; SUBCUTANEOUS at 07:13

## 2022-04-16 RX ADMIN — INSULIN GLARGINE 60 UNITS: 100 INJECTION, SOLUTION SUBCUTANEOUS at 08:35

## 2022-04-16 RX ADMIN — FUROSEMIDE 80 MG: 40 TABLET ORAL at 08:35

## 2022-04-16 RX ADMIN — SODIUM CHLORIDE, PRESERVATIVE FREE 10 ML: 5 INJECTION INTRAVENOUS at 08:34

## 2022-04-16 RX ADMIN — CARVEDILOL 3.12 MG: 3.12 TABLET, FILM COATED ORAL at 21:42

## 2022-04-16 RX ADMIN — BUSPIRONE HYDROCHLORIDE 7.5 MG: 5 TABLET ORAL at 21:40

## 2022-04-16 RX ADMIN — ANTI-FUNGAL POWDER MICONAZOLE NITRATE TALC FREE: 1.42 POWDER TOPICAL at 17:20

## 2022-04-16 RX ADMIN — ATORVASTATIN CALCIUM 80 MG: 80 TABLET, FILM COATED ORAL at 08:35

## 2022-04-16 RX ADMIN — INSULIN LISPRO 15 UNITS: 100 INJECTION, SOLUTION INTRAVENOUS; SUBCUTANEOUS at 17:13

## 2022-04-16 RX ADMIN — FUROSEMIDE 80 MG: 40 TABLET ORAL at 17:13

## 2022-04-16 ASSESSMENT — PAIN SCALES - GENERAL
PAINLEVEL_OUTOF10: 3
PAINLEVEL_OUTOF10: 5
PAINLEVEL_OUTOF10: 3

## 2022-04-16 ASSESSMENT — PAIN DESCRIPTION - LOCATION: LOCATION: GENERALIZED

## 2022-04-16 NOTE — PROGRESS NOTES
Nephrology Progress Note    Subjective/   57y.o. year old female who we are seeing in consultation for end-stage renal disease on hemodialysis          Interval history:  Patient seen and examined denies any new complaints complains of mild shortness of breath today. She has been out of DKA protocol since yesterday and anion gap is closed. Patient is able to eat  Patient had extra dialysis yesterday  Hemodynamically stable today. History of Present Illness: This is a 57 y.o. female with hypertension, type 2 diabetes mellitus, end-stage renal disease on hemodialysis   who was recently taken off dialysis about 6 weeks ago due to regaining residual kidney function. Over the course of the last 2 weeks patient  was restarted on hemodialysis due to worsening fluid overload. Initially had problems with her  tunneled catheter which was replaced last week- patient was dialyzed on Saturday, 4/9/2021 and yesterday-4/13/22  at the outpatient dialysis unit. Patient presented with complaints of shortness of breath over the last 2 to 3 days progressively worsening. Patient had associated  Nausea, chest pain but no vomiting. She is on 3 L nasal cannula chronically at home. Patient was found to be in acute DKA with blood sugars greater than 500 was started on the modified DKA protocol. Blood pressures on admission were elevated above 200s and her chest x-ray showed evidence of pulmonary vascular congestion. Labs showed a potassium of 3.3 with BUN/creatinine of 18 and 1.78 mg/dL, bicarbonate of 18 and troponin of 78. Beta hydroxybutyrate was 7.57.   Objective/     Vitals:    04/15/22 1629 04/15/22 2058 04/16/22 0147 04/16/22 0745   BP: (!) 140/47 (!) 118/43 (!) 126/56 (!) 114/46   Pulse: 57 71 63 68   Resp: 18 18 16 17   Temp: 97.9 °F (36.6 °C) 98.3 °F (36.8 °C) 98.2 °F (36.8 °C) 98.3 °F (36.8 °C)   TempSrc:  Oral Oral Oral   SpO2:  97%  96%   Weight: 242 lb 8.1 oz (110 kg)      Height:         24HR INTAKE/OUTPUT:      Intake/Output Summary (Last 24 hours) at 4/16/2022 1222  Last data filed at 4/16/2022 1205  Gross per 24 hour   Intake 1640 ml   Output 1 ml   Net 1639 ml     Patient Vitals for the past 96 hrs (Last 3 readings):   Weight   04/15/22 1629 242 lb 8.1 oz (110 kg)   04/13/22 1557 248 lb (112.5 kg)       Constitutional:  Alert, awake, no apparent distress  Cardiovascular:  S1, S2 without m/r/g  Respiratory:  CTA B without w/r/r  Abdomen: +bs, soft, nt  Ext: 2+ LE edema    Data/  Recent Labs     04/13/22  1535 04/15/22  1014   WBC 5.8 7.3   HGB 11.8* 11.0*   HCT 35.2* 33.2*   MCV 92.9 93.9    186     Recent Labs     04/14/22  0956 04/14/22  0956 04/14/22  1601 04/15/22  1014 04/16/22  0907      < > 137 140 136   K 2.7*   < > 3.2* 3.9 4.0   CL 98   < > 100 102 100   CO2 25   < > 26 22 24   GLUCOSE 352*   < > 168* 320* 364*   PHOS 2.1*  --  4.9* 3.5  --    MG 1.9  --  2.0 1.9  --    BUN 16   < > 16 20 18   CREATININE 1.59*   < > 1.79* 2.63* 2.63*   LABGLOM 33*   < > 29* 18* 18*   GFRAA 40*   < > 35* 22* 22*    < > = values in this interval not displayed. Assessment/   1. End-stage renal disease on hemodialysis by way of a right-sided tunneled catheter, transiently taken off dialysis 6 weeks ago and re- started due to worsening fluid overload 1 week ago. Patient still makes urine proximately 400 to 500 cc daily     2. Hypertensive urgency- improving     3. Acute DKA currently on DKA protocol-resolved     4. Hypokalemia #2 to osmotic diuresis and  total body depletion-improved     5. Hypophosphatemia-resolved     6. CHF with diastolic dysfunction and evidence of fluid overload pulmonary vascular congestion on chest xray     7.  Urinary tract infection with pyuria         Plan/     Will schedule her for TTS HD schedule, HD today.   Basic metabolic panel daily  Continue oral Lasix 80 mg twice daily    Kris Sanz MD    Nephrologist      Kris Sanz MD

## 2022-04-16 NOTE — PLAN OF CARE
Problem: Falls - Risk of:  Goal: Will remain free from falls  Description: Will remain free from falls  Outcome: Ongoing  Goal: Absence of physical injury  Description: Absence of physical injury  Outcome: Ongoing     Problem: Skin Integrity:  Goal: Will show no infection signs and symptoms  Description: Will show no infection signs and symptoms  Outcome: Ongoing  Goal: Absence of new skin breakdown  Description: Absence of new skin breakdown  Outcome: Ongoing     Problem: Musculor/Skeletal Functional Status  Goal: Highest potential functional level  Outcome: Ongoing  Goal: Absence of falls  Outcome: Ongoing     Problem: Pain:  Goal: Pain level will decrease  Description: Pain level will decrease  Outcome: Ongoing  Goal: Control of acute pain  Description: Control of acute pain  Outcome: Ongoing  Goal: Control of chronic pain  Description: Control of chronic pain  Outcome: Ongoing

## 2022-04-16 NOTE — PROGRESS NOTES
Pulmonary Progress Note  [facility]      Patient - Raine Bueno,  Age - 57 y.o.    - 1960      Room Number - 3964/9893-98   N -  [id number]   [facility]  Date of Admission -  2022  3:17 PM        Consulting Rosa Gonzalez MD  Primary Care Physician - Melany Briones, APRN - CNP     SUBJECTIVE   She says she \"feels miserable on the inside\" but looks comfortable    OBJECTIVE   VITALS    height is 5' 5\" (1.651 m) and weight is 242 lb 8.1 oz (110 kg). Her oral temperature is 98.3 °F (36.8 °C). Her blood pressure is 114/46 (abnormal) and her pulse is 68. Her respiration is 17 and oxygen saturation is 96%. Body mass index is 40.36 kg/m². Temperature Range: Temp: 98.3 °F (36.8 °C) Temp  Av.1 °F (36.7 °C)  Min: 97.9 °F (36.6 °C)  Max: 98.3 °F (36.8 °C)  BP Range:  Systolic (06KSC), PMK:905 , Min:110 , ESK:144     Diastolic (75EFD), RHZ:60, Min:31, Max:56    Pulse Range: Pulse  Av.9  Min: 62  Max: 71  Respiration Range: Resp  Av.1  Min: 14  Max: 21  Current Pulse Ox[de-identified]  SpO2: 96 %  24HR Pulse Ox Range:  SpO2  Av.5 %  Min: 92 %  Max: 97 %  Oxygen Amount and Delivery: O2 Flow Rate (L/min): 2.5 L/min    Wt Readings from Last 3 Encounters:   04/15/22 242 lb 8.1 oz (110 kg)   22 248 lb (112.5 kg)   22 250 lb (113.4 kg)       I/O (24 Hours)    Intake/Output Summary (Last 24 hours) at 2022 0894  Last data filed at 2022 0647  Gross per 24 hour   Intake 1210 ml   Output 1 ml   Net 1209 ml       EXAM     General Appearance  Awake, alert, oriented, in no acute distress  HEENT - normocephalic, atraumatic.  []  Mallampati  [x] Crowded airway   [x] Macroglossia  []  Retrognathia  [] Micrognathia  []  Normal tongue size []  Normal Bite  [] Danilo sign positive    Neck - Supple,  trachea midline   Lungs -diminished no wheezes, rhonchi or rales  Cardiovascular - Heart sounds are normal.  Regular rate and rhythm   Abdomen - Soft, nontender, nondistended, no masses or organomegaly  Neurologic - There are no focal motor or sensory deficits  Skin - No bruising or bleeding  Extremities - No clubbing, cyanosis, edema    MEDS      insulin lispro  0-18 Units SubCUTAneous TID WC    insulin lispro  0-9 Units SubCUTAneous Nightly    insulin glargine  60 Units SubCUTAneous BID    sodium chloride flush  5-40 mL IntraVENous 2 times per day    aspirin  81 mg Oral Daily    ranolazine  1,000 mg Oral BID    busPIRone  7.5 mg Oral TID    pantoprazole  40 mg Oral QAM AC    atorvastatin  80 mg Oral Daily    febuxostat  40 mg Oral Daily    docusate sodium  100 mg Oral BID    tamsulosin  0.4 mg Oral Daily    insulin lispro  15 Units SubCUTAneous TID AC    sodium bicarbonate  650 mg Oral TID    furosemide  80 mg Oral BID    traZODone  50 mg Oral Nightly    gabapentin  300 mg Oral BID    melatonin  9 mg Oral Nightly    carvedilol  3.125 mg Oral BID    heparin (porcine)  5,000 Units SubCUTAneous 3 times per day    sodium phosphate IVPB  10 mmol IntraVENous Once    fluconazole  200 mg IntraVENous Q24H    cefTRIAXone (ROCEPHIN) IV  2,000 mg IntraVENous Q24H    miconazole   Topical BID      sodium chloride       sodium chloride flush, sodium chloride, ondansetron **OR** ondansetron, lidocaine-prilocaine, anticoagulant sodium citrate, anticoagulant sodium citrate, glucose, dextrose, glucagon (rDNA), acetaminophen    LABS   CBC   Recent Labs     04/15/22  1014   WBC 7.3   HGB 11.0*   HCT 33.2*   MCV 93.9        BMP:   Lab Results   Component Value Date     04/15/2022    K 3.9 04/15/2022     04/15/2022    CO2 22 04/15/2022    BUN 20 04/15/2022    LABALBU 3.6 01/17/2022    CREATININE 2.63 04/15/2022    CALCIUM 8.4 04/15/2022    GFRAA 22 04/15/2022    LABGLOM 18 04/15/2022     ABGs:No results found for: PHART, PO2ART, IIS4DLU   Lab Results   Component Value Date    MODE CONDITION NO LONGER WARRANTS 04/06/2021     Ionized Calcium:  No results found for: IONCA  Magnesium:    Lab Results   Component Value Date    MG 1.9 04/15/2022     Phosphorus:    Lab Results   Component Value Date    PHOS 3.5 04/15/2022        LIVER PROFILE No results for input(s): AST, ALT, LIPASE, BILIDIR, BILITOT, ALKPHOS in the last 72 hours. Invalid input(s): AMYLASE,  ALB  INR No results for input(s): INR in the last 72 hours.   PTT   Lab Results   Component Value Date    APTT 27.5 11/10/2021         RADIOLOGY     Chest x-ray shows improvement in right lung infiltrate      ASSESSMENT/PLAN     Diabetic ketoacidosis-resolved  Right lower lobe infiltrate/effusion  UTI  Chronic hypoxic respiratory failure  End stage renal disease on dialysis  ERICK  (clinical diagnosis)  Morbid obesity    Labs were not ordered today I have ordered them stat  Needs mobilization  Continue incentive spirometry  Sugars are still elevated, we will go ahead and increase her Lantus to her usual dose  Still awaiting urine culture results, continue ceftriaxone and Diflucan  Dialysis per nephrology    Electronically signed by Leeann Pillai MD on 4/16/2022 at 8:44 AM

## 2022-04-16 NOTE — PROGRESS NOTES
Progress Note  Date:2022       Room:[facility]9-  Patient Anamika Sanchez     YOB: 1960     Age:62 y.o. Subjective    Subjective:  Symptoms:  Stable. (Fatigued from dialysis). Activity level: Impaired due to weakness. Pain:  She reports no pain. Review of Systems  Objective         Vitals Last 24 Hours:  TEMPERATURE:  Temp  Av.1 °F (36.7 °C)  Min: 97.9 °F (36.6 °C)  Max: 98.3 °F (36.8 °C)  RESPIRATIONS RANGE: Resp  Av.5  Min: 16  Max: 21  PULSE OXIMETRY RANGE: SpO2  Av.6 %  Min: 92 %  Max: 97 %  PULSE RANGE: Pulse  Av.5  Min: 57  Max: 71  BLOOD PRESSURE RANGE: Systolic (62FOC), WFA:615 , Min:110 , HNK:635   ; Diastolic (06QEM), OFC:11, Min:31, Max:56    I/O (24Hr): Intake/Output Summary (Last 24 hours) at 2022 1031  Last data filed at 2022 0647  Gross per 24 hour   Intake 1050 ml   Output 1 ml   Net 1049 ml     Objective:  General Appearance:  Comfortable. Vital signs: (most recent): Blood pressure (!) 114/46, pulse 68, temperature 98.3 °F (36.8 °C), temperature source Oral, resp. rate 17, height 5' 5\" (1.651 m), weight 242 lb 8.1 oz (110 kg), SpO2 96 %. Vital signs are normal.    Lungs:  Normal effort and normal respiratory rate. Breath sounds clear to auscultation. Heart: Normal rate. S1 normal and S2 normal.      Labs/Imaging/Diagnostics    Labs:  CBC:  Recent Labs     22  1535 04/15/22  1014   WBC 5.8 7.3   RBC 3.79* 3.54*   HGB 11.8* 11.0*   HCT 35.2* 33.2*   MCV 92.9 93.9   RDW 15.4* 15.7*    186     CHEMISTRIES:  Recent Labs     22  0956 22  0956 22  1601 04/15/22  1014 22  0907      < > 137 140 136   K 2.7*   < > 3.2* 3.9 4.0   CL 98   < > 100 102 100   CO2 25   < > 26 22 24   BUN 16   < > 16 20 18   CREATININE 1.59*   < > 1.79* 2.63* 2.63*   GLUCOSE 352*   < > 168* 320* 364*   PHOS 2.1*  --  4.9* 3.5  --    MG 1.9  --  2.0 1.9  --     < > = values in this interval not displayed. PT/INR:No results for input(s): PROTIME, INR in the last 72 hours. APTT:No results for input(s): APTT in the last 72 hours. LIVER PROFILE:No results for input(s): AST, ALT, BILIDIR, BILITOT, ALKPHOS in the last 72 hours. Imaging Last 24 Hours:  ECHO Complete 2D W Doppler W Color    Result Date: 4/14/2022  [facility] Transthoracic Echocardiography Report (TTE)  Patient Name Vishal Spears Date of Study               04/14/2022               TRAMAINE   Date of      1960  Gender                      Female  Birth   Age          57 year(s)  Race                           Room Number  2012        Height:                     65 inch, 165.1 cm   Corporate ID [id number]    Weight:                     248 pounds, 112.5 kg  #   Patient Acct [de-identified]   BSA:          2.17 m^2      BMI:      41.27  #                                                              kg/m^2   MR #         [id number]      Sonographer                 Emelina Montalvo   Accession #  [id number]  Interpreting Physician      [address] [place] [address]   Fellow                   Referring Nurse                           Practitioner   Interpreting             Referring Physician         Emily Jain  Fellow  Type of Study   TTE procedure:2D Echocardiogram, M-Mode, Doppler, Color Doppler. Procedure Date Date: 04/14/2022 Start: 02:02 PM Study Location: [address] [place] [address] Technical Quality: Fair visualization Indications:Dyspnea/SOB and Hypoxemia. Patient Status: Inpatient Height: 65 inches Weight: 248.01 pounds BSA: 2.17 m^2 BMI: 41.27 kg/m^2 Rhythm: Within normal limits HR: 73 bpm BP: 106/57 mmHg Allergies   - Tape. - Ace Inhibitors. - Contrast.   - Metformin.   - NSAIDS. CONCLUSIONS Summary Left ventricle is normal in size. Moderate left ventricular hypertrophy. Global left ventricular systolic function is normal. Estimated LV EF >55 %. No obvious wall motion abnormality seen. Evidence of mild (grade I) diastolic dysfunction.  Left atrium is normal in size. Mild tricuspid regurgitation. No pulmonary hypertension. Estimated right ventricular systolic pressure is 58ELDU. Signature ----------------------------------------------------------------------------  Electronically signed by Emelina Montalvo(Sonographer) on 04/14/2022 02:26  PM ---------------------------------------------------------------------------- ----------------------------------------------------------------------------  Electronically signed by Rustam Katz(Interpreting physician) on 04/14/2022  02:29 PM ---------------------------------------------------------------------------- FINDINGS Left Atrium Left atrium is normal in size. Left Ventricle Left ventricle is normal in size. Moderate left ventricular hypertrophy. Global left ventricular systolic function is normal. Estimated LV EF >55 %. No obvious wall motion abnormality seen. Evidence of mild (grade I) diastolic dysfunction. Right Atrium Right atrium is normal in size. Right Ventricle Right ventricle was not well visualized. Mitral Valve No obvious valvular abnormality seen. No evidence of mitral regurgitation. Aortic Valve No obvious valvular abnormality seen. No evidence of aortic insufficiency or stenosis. Tricuspid Valve No obvious valvular abnormality. Mild tricuspid regurgitation. No pulmonary hypertension. Estimated right ventricular systolic pressure is 44QZHW. Pulmonic Valve No obvious valvular abnormality seen. Trivial pulmonic insufficiency. Pericardial Effusion No significant pericardial effusion is seen. Pleural Effusion No pleural effusion seen. Miscellaneous Normal aortic root dimension. E/E' average = 20.2.  M-mode / 2D Measurements & Calculations:   LVIDd:4.63 cm(3.7 - 5.6 cm)      Diastolic CDAZKQ:28.2 ml  MJTGM:3.95 cm(2.2 - 4.0 cm)      Systolic KMKDET:37.2 ml  DMOM:9.88 cm(0.6 - 1.1 cm)       Aortic Root:3.1 cm(2.0 - 3.7 cm)  LVPWd:1.37 cm(0.6 - 1.1 cm)      LA Dimension: 4.4 cm(1.9 - 4.0 cm)  Fractional Shortenin.88 %    LA volume/Index: 57.2 ml /26m^2  Calculated LVEF (%): 77.14 %     LVOT:1.7 cm   Mitral:                                Aortic   Peak E-Wave: 1.30 m/s                  Peak Velocity: 1.01 m/s  Peak A-Wave: 0.82 m/s                  Mean Velocity: 0.69 m/s  E/A Ratio: 1.59                        Peak Gradient: 4.08 mmHg  Peak Gradient: 6.76 mmHg               Mean Gradient: 2 mmHg  Deceleration Time: 165 msec                                          Area (continuity): 2.01 cm^2                                         AV VTI: 19.8 cm   Tricuspid:                             Pulmonic:   Estimated RVSP: 14 mmHg  Peak TR Velocity: 1.73 m/s  Peak TR Gradient: 11.9716 mmHg  Estimated RA Pressure: 3 mmHg                                         Estimated PASP: 14.97 mmHg  Septal Wall E' velocity:0.05 m/s Lateral Wall E' velocity:0.08 m/s    XR CHEST PORTABLE    Result Date: 4/15/2022  EXAMINATION: ONE XRAY VIEW OF THE CHEST 4/15/2022 9:30 am COMPARISON: 2022, 2022, 2022 HISTORY: ORDERING SYSTEM PROVIDED HISTORY: sob TECHNOLOGIST PROVIDED HISTORY: sob Reason for Exam: shortness of breath FINDINGS: Right-sided dialysis catheter is stable in appropriate in positioning. Improved pulmonary vascular congestion relative to most recent prior. Minimal strandy perihilar and bibasilar opacities. Small left pleural effusion which appears overall unchanged. No pneumothorax. Unchanged cardiomegaly with prior CABG. Cervical fusion hardware is partially imaged. No acute bony findings. Improved pulmonary vascular congestion relative to 2 days prior with minimal residual strandy perihilar and bibasilar densities which may be due to atelectasis or improving edema. Similar small left pleural effusion.      Assessment//Plan           Hospital Problems           Last Modified POA    * (Principal) DKA, type 2, not at goal [facility]) 2022 Yes    Atherosclerosis of coronary artery bypass graft of native heart without angina pectoris 4/13/2022 Yes    Chronic diastolic heart failure ([address] 4/13/2022 Yes    Diabetic polyneuropathy associated with type 2 diabetes mellitus ([address] 4/13/2022 Yes    Mixed hyperlipidemia 4/13/2022 Yes    Type 2 diabetes mellitus with kidney complication, with long-term current use of insulin ([address] 4/13/2022 Yes    Essential hypertension 4/13/2022 Yes    Chronic ischemic heart disease 4/13/2022 Yes    Morbid obesity with BMI of 40.0-44.9, adult ([address] 4/13/2022 Yes    Long-term memory loss 4/13/2022 Yes    Anxiety 4/13/2022 Yes    Chronic midline low back pain with bilateral sciatica 4/13/2022 Yes    End-stage renal disease ([address] 4/13/2022 Yes    Hypokalemia 4/14/2022 Yes        Assessment & Plan  Sugars remain elevated, but basal insulin at a large dose    Chronic hypoxic respiratory failure - per pulmonology    ESRD - dialysis    Acute on chronic CHF - per nephrology      Electronically signed by Zoey Pagan MD on 4/16/22 at 10:31 AM EDT

## 2022-04-16 NOTE — FLOWSHEET NOTE
04/16/22 1554   Vital Signs   BP (!) 117/44   Temp 98 °F (36.7 °C)   Pulse 65   Weight 239 lb 13.8 oz (108.8 kg)   Weight Method Estimated   Percent Weight Change -1.8   Post-Hemodialysis Assessment   Rinseback Volume (ml) 500 ml   Total Liters Processed (l/min) 57.3 l/min   Dialyzer Clearance Lightly streaked   Duration of Treatment (minutes) 180 minutes   Hemodialysis Intake (ml) 500 ml   Hemodialysis Output (ml) 2500 ml   NET Removed (ml) 2000 ml   Tolerated Treatment Good   Patient Response to Treatment Patient tolerated treatment well without interrruptions. 2000mL removed. Drsg changed.      Report given to Ariadna Herr

## 2022-04-16 NOTE — PLAN OF CARE
Problem: Falls - Risk of:  Goal: Will remain free from falls  Description: Will remain free from falls  Outcome: Ongoing     Problem: Skin Integrity:  Goal: Absence of new skin breakdown  Description: Absence of new skin breakdown  Outcome: Ongoing     Problem: Musculor/Skeletal Functional Status  Goal: Absence of falls  Outcome: Ongoing

## 2022-04-17 LAB
ANION GAP SERPL CALCULATED.3IONS-SCNC: 13 MMOL/L (ref 9–17)
BUN BLDV-MCNC: 18 MG/DL (ref 8–23)
CALCIUM SERPL-MCNC: 8.6 MG/DL (ref 8.6–10.4)
CHLORIDE BLD-SCNC: 100 MMOL/L (ref 98–107)
CO2: 24 MMOL/L (ref 20–31)
CREAT SERPL-MCNC: 2.95 MG/DL (ref 0.5–0.9)
GFR AFRICAN AMERICAN: 19 ML/MIN
GFR NON-AFRICAN AMERICAN: 16 ML/MIN
GFR SERPL CREATININE-BSD FRML MDRD: ABNORMAL ML/MIN/{1.73_M2}
GLUCOSE BLD-MCNC: 146 MG/DL (ref 65–105)
GLUCOSE BLD-MCNC: 167 MG/DL (ref 70–99)
GLUCOSE BLD-MCNC: 194 MG/DL (ref 65–105)
GLUCOSE BLD-MCNC: 214 MG/DL (ref 65–105)
GLUCOSE BLD-MCNC: 251 MG/DL (ref 65–105)
POTASSIUM SERPL-SCNC: 3.8 MMOL/L (ref 3.7–5.3)
SODIUM BLD-SCNC: 137 MMOL/L (ref 135–144)

## 2022-04-17 PROCEDURE — 80048 BASIC METABOLIC PNL TOTAL CA: CPT

## 2022-04-17 PROCEDURE — 2060000000 HC ICU INTERMEDIATE R&B

## 2022-04-17 PROCEDURE — 6370000000 HC RX 637 (ALT 250 FOR IP): Performed by: INTERNAL MEDICINE

## 2022-04-17 PROCEDURE — 82947 ASSAY GLUCOSE BLOOD QUANT: CPT

## 2022-04-17 PROCEDURE — 6360000002 HC RX W HCPCS: Performed by: FAMILY MEDICINE

## 2022-04-17 PROCEDURE — 99232 SBSQ HOSP IP/OBS MODERATE 35: CPT | Performed by: FAMILY MEDICINE

## 2022-04-17 PROCEDURE — 6360000002 HC RX W HCPCS: Performed by: INTERNAL MEDICINE

## 2022-04-17 PROCEDURE — 2580000003 HC RX 258: Performed by: STUDENT IN AN ORGANIZED HEALTH CARE EDUCATION/TRAINING PROGRAM

## 2022-04-17 PROCEDURE — 36415 COLL VENOUS BLD VENIPUNCTURE: CPT

## 2022-04-17 PROCEDURE — 2580000003 HC RX 258: Performed by: INTERNAL MEDICINE

## 2022-04-17 PROCEDURE — 6370000000 HC RX 637 (ALT 250 FOR IP): Performed by: FAMILY MEDICINE

## 2022-04-17 RX ADMIN — SODIUM BICARBONATE 650 MG: 650 TABLET ORAL at 14:47

## 2022-04-17 RX ADMIN — HEPARIN SODIUM 5000 UNITS: 5000 INJECTION INTRAVENOUS; SUBCUTANEOUS at 22:28

## 2022-04-17 RX ADMIN — Medication 9 MG: at 22:28

## 2022-04-17 RX ADMIN — TAMSULOSIN HYDROCHLORIDE 0.4 MG: 0.4 CAPSULE ORAL at 09:30

## 2022-04-17 RX ADMIN — GABAPENTIN 300 MG: 300 CAPSULE ORAL at 09:30

## 2022-04-17 RX ADMIN — INSULIN LISPRO 5 UNITS: 100 INJECTION, SOLUTION INTRAVENOUS; SUBCUTANEOUS at 20:26

## 2022-04-17 RX ADMIN — HEPARIN SODIUM 5000 UNITS: 5000 INJECTION INTRAVENOUS; SUBCUTANEOUS at 05:47

## 2022-04-17 RX ADMIN — FLUCONAZOLE 200 MG: 200 INJECTION, SOLUTION INTRAVENOUS at 13:20

## 2022-04-17 RX ADMIN — SODIUM BICARBONATE 650 MG: 650 TABLET ORAL at 09:29

## 2022-04-17 RX ADMIN — FUROSEMIDE 80 MG: 40 TABLET ORAL at 17:02

## 2022-04-17 RX ADMIN — SODIUM CHLORIDE, PRESERVATIVE FREE 10 ML: 5 INJECTION INTRAVENOUS at 20:26

## 2022-04-17 RX ADMIN — GABAPENTIN 300 MG: 300 CAPSULE ORAL at 20:19

## 2022-04-17 RX ADMIN — BUSPIRONE HYDROCHLORIDE 7.5 MG: 5 TABLET ORAL at 20:19

## 2022-04-17 RX ADMIN — DOCUSATE SODIUM 100 MG: 100 CAPSULE, LIQUID FILLED ORAL at 20:19

## 2022-04-17 RX ADMIN — DOCUSATE SODIUM 100 MG: 100 CAPSULE, LIQUID FILLED ORAL at 09:30

## 2022-04-17 RX ADMIN — INSULIN LISPRO 3 UNITS: 100 INJECTION, SOLUTION INTRAVENOUS; SUBCUTANEOUS at 09:29

## 2022-04-17 RX ADMIN — SODIUM BICARBONATE 650 MG: 650 TABLET ORAL at 20:19

## 2022-04-17 RX ADMIN — INSULIN LISPRO 3 UNITS: 100 INJECTION, SOLUTION INTRAVENOUS; SUBCUTANEOUS at 12:11

## 2022-04-17 RX ADMIN — SODIUM CHLORIDE, PRESERVATIVE FREE 10 ML: 5 INJECTION INTRAVENOUS at 09:34

## 2022-04-17 RX ADMIN — BUSPIRONE HYDROCHLORIDE 7.5 MG: 5 TABLET ORAL at 09:30

## 2022-04-17 RX ADMIN — BUSPIRONE HYDROCHLORIDE 7.5 MG: 5 TABLET ORAL at 14:47

## 2022-04-17 RX ADMIN — CARVEDILOL 3.12 MG: 3.12 TABLET, FILM COATED ORAL at 22:36

## 2022-04-17 RX ADMIN — INSULIN GLARGINE 70 UNITS: 100 INJECTION, SOLUTION SUBCUTANEOUS at 09:29

## 2022-04-17 RX ADMIN — ATORVASTATIN CALCIUM 80 MG: 80 TABLET, FILM COATED ORAL at 09:30

## 2022-04-17 RX ADMIN — ANTI-FUNGAL POWDER MICONAZOLE NITRATE TALC FREE: 1.42 POWDER TOPICAL at 20:27

## 2022-04-17 RX ADMIN — PANTOPRAZOLE SODIUM 40 MG: 40 TABLET, DELAYED RELEASE ORAL at 05:47

## 2022-04-17 RX ADMIN — ANTI-FUNGAL POWDER MICONAZOLE NITRATE TALC FREE: 1.42 POWDER TOPICAL at 09:31

## 2022-04-17 RX ADMIN — ASPIRIN 81 MG: 81 TABLET, CHEWABLE ORAL at 09:30

## 2022-04-17 RX ADMIN — CEFTRIAXONE SODIUM 2000 MG: 2 INJECTION, POWDER, FOR SOLUTION INTRAMUSCULAR; INTRAVENOUS at 22:29

## 2022-04-17 RX ADMIN — TRAZODONE HYDROCHLORIDE 50 MG: 50 TABLET ORAL at 22:28

## 2022-04-17 RX ADMIN — FEBUXOSTAT 40 MG: 40 TABLET, FILM COATED ORAL at 09:31

## 2022-04-17 RX ADMIN — FUROSEMIDE 80 MG: 40 TABLET ORAL at 09:44

## 2022-04-17 RX ADMIN — INSULIN GLARGINE 70 UNITS: 100 INJECTION, SOLUTION SUBCUTANEOUS at 20:20

## 2022-04-17 RX ADMIN — INSULIN LISPRO 6 UNITS: 100 INJECTION, SOLUTION INTRAVENOUS; SUBCUTANEOUS at 17:02

## 2022-04-17 RX ADMIN — CARVEDILOL 3.12 MG: 3.12 TABLET, FILM COATED ORAL at 09:30

## 2022-04-17 RX ADMIN — HEPARIN SODIUM 5000 UNITS: 5000 INJECTION INTRAVENOUS; SUBCUTANEOUS at 14:47

## 2022-04-17 RX ADMIN — RANOLAZINE 1000 MG: 1000 TABLET, FILM COATED, EXTENDED RELEASE ORAL at 09:31

## 2022-04-17 RX ADMIN — RANOLAZINE 1000 MG: 1000 TABLET, FILM COATED, EXTENDED RELEASE ORAL at 22:28

## 2022-04-17 NOTE — DISCHARGE INSTR - COC
Continuity of Care Form    Patient Name: Rozina Rogers   :  1960  MRN:  [id number]    Admit date:  2022  Discharge date:  ***    Code Status Order: Full Code   Advance Directives:      Admitting Physician:  Robert Mas MD  PCP: Josselyn Sanchez, AFSANEH - CNP    Discharging Nurse: [facility] Unit/Room#: 2089/2089-01  Discharging Unit Phone Number: ***    Emergency Contact:   Extended Emergency Contact Information  Primary Emergency Contact:  [address], [address] Phone: [phone]  Relation: Brother/Sister  Secondary Emergency Contact: Tess Zeng, [address] Phone: [phone]  Relation: Brother/Sister    Past Surgical History:  Past Surgical History:   Procedure Laterality Date    ANKLE FRACTURE SURGERY      AV FISTULA CREATION  2021    BREAST SURGERY      CARPAL TUNNEL RELEASE      CHOLECYSTECTOMY, OPEN N/A     CORONARY ARTERY BYPASS GRAFT      x3    DIALYSIS FISTULA CREATION Left 2021    LEFT AV FISTULA CREATION UPPER EXTREMITY performed by Aydee Cuevas MD at [facility]      IR TUNNELED [address] 5 YEARS  2021    IR TUNNELED [address] 5 YEARS 2021 [facility] SPECIAL PROCEDURES    KNEE ARTHROSCOPY      right    OTHER SURGICAL HISTORY  2022    cvc exchange    TONSILLECTOMY         Immunization History:   Immunization History   Administered Date(s) Administered    COVID-19, Pfizer Purple top, DILUTE for use, 12+ yrs, 30mcg/0.3mL dose 2021, 2021    Influenza Virus Vaccine 10/18/2018, 09/10/2019, 2020    Influenza, MDCK Quadv, IM, PF (Flucelvax 2 yrs and older) 2021    Influenza, Quadv, IM, (6 mo and older Fluzone, Flulaval, Fluarix and 3 yrs and older Afluria) 10/16/2017    Influenza, Quadv, IM, PF (6 mo and older Fluzone, Flulaval, Fluarix, and 3 yrs and older Afluria) 10/18/2018, 09/10/2019, 2019, 2020    Pneumococcal Conjugate 13-valent Chu Shelley) 2019 Pneumococcal Polysaccharide ([id number]) 10/30/2014    Tdap (Boostrix, Adacel) 11/18/2017       Active Problems:  Patient Active Problem List   Diagnosis Code    Atherosclerosis of coronary artery bypass graft of native heart without angina pectoris I25.810    Chronic diastolic heart failure ([facility]) I50.32    Diabetic polyneuropathy associated with type 2 diabetes mellitus ([facility]) E11.42    History of coronary artery bypass graft Z95.1    Iron deficiency anemia D50.9    Spinal stenosis of lumbar region with neurogenic claudication M48.062    Mixed hyperlipidemia E78.2    Type 2 diabetes mellitus with kidney complication, with long-term current use of insulin ([facility]) E11.29, Z79.4    Thyroid nodule greater than or equal to 1 cm in diameter incidentally noted on imaging study E04.1    Essential hypertension I10    Chronic ischemic heart disease I25.9    Ischemic stroke of frontal lobe ([facility]) I63.9    Morbid obesity with BMI of 40.0-44.9, adult ([facility]) E66.01, Z68.41    Disequilibrium syndrome E87.8    Generalized weakness R53.1    Long-term memory loss R41.3    Muscle right arm weakness M62.81    Anxiety F41.9    Chronic midline low back pain with bilateral sciatica M54.41, M54.42, G89.29    Tremor R25.1    COVID U07.1    End-stage renal disease ([facility] N18.6    Diabetic ketoacidosis without coma associated with type 2 diabetes mellitus ([facility] E11.10    Hypokalemia E87.6       Isolation/Infection:   Isolation            Contact          Patient Infection Status       Infection Onset Added Last Indicated Last Indicated By Review Planned Expiration Resolved Resolved By    [facility] 08/03/20 08/13/21 08/13/21 Fariba Khoury RN        Added from external infection.     Resolved    COVID-19 (Rule Out) 04/14/22 04/14/22 04/14/22 COVID-19 & Influenza Combo (Ordered)   04/15/22 Becky Alcantar RN    COVID-19 (Rule Out) 04/13/22 04/13/22 04/13/22 COVID-19 & Influenza Combo (Ordered)   04/13/22 Rule-Out Test Resulted    COVID-19 (Rule Out) 22 COVID-19 (Ordered)   22 Rule-Out Test Resulted    COVID-19 22 COVID-19   22     COVID-19 (Rule Out) 22 COVID-19 (Ordered)   22 Rule-Out Test Resulted            Nurse Assessment:  Last Vital Signs: BP (!) 100/45   Pulse 65   Temp 98 °F (36.7 °C) (Oral)   Resp 18   Ht 5' 5\" (1.651 m)   Wt 263 lb 7.2 oz (119.5 kg)   SpO2 91%   BMI 43.84 kg/m²     Last documented pain score (0-10 scale): Pain Level: 5  Last Weight:   Wt Readings from Last 1 Encounters:   22 263 lb 7.2 oz (119.5 kg)     Mental Status:  oriented    IV Access:  - None    Nursing Mobility/ADLs:  Walking   Assisted  Transfer  Assisted  Bathing  Assisted  Dressing  Assisted  Toileting  Assisted  Feeding  Independent  Med Admin  Independent  Med Delivery   whole    Wound Care Documentation and Therapy:        Elimination:  Continence: Bowel: Yes  Bladder: No  Urinary Catheter: None   Colostomy/Ileostomy/Ileal Conduit: No       Date of Last BM: 22    Intake/Output Summary (Last 24 hours) at 2022 0841  Last data filed at 2022 0833  Gross per 24 hour   Intake 1220 ml   Output 2500 ml   Net -1280 ml     I/O last 3 completed shifts: In: 1680 [P.O.:1180]  Out: 2501 [Urine:1]    Safety Concerns:     None    Impairments/Disabilities:      None    Nutrition Therapy:  Current Nutrition Therapy:   - Oral Diet:  General    Routes of Feeding: Oral  Liquids: Thin Liquids  Daily Fluid Restriction: yes - amount 1500  Last Modified Barium Swallow with Video (Video Swallowing Test): not done    Treatments at the Time of Hospital Discharge:   Respiratory Treatments: na  Oxygen Therapy:  is on oxygen at 4 L/min per nasal cannula.   Ventilator:    - No ventilator support    Rehab Therapies: Physical Therapy and Occupational Therapy  Weight Bearing Status/Restrictions: No weight bearing restrictions  Other Medical Equipment (for information only, NOT a DME order):  hospital bed  Other Treatments: Skilled Nursing assessment and monitoring. Medication education and monitoring per protocol. Patient's personal belongings (please select all that are sent with patient):  Glasses    RN SIGNATURE:  Electronically signed by Chidi Kohli RN on 4/28/22 at 12:40 PM EDT    CASE MANAGEMENT/SOCIAL WORK SECTION    Inpatient Status Date: 4/13/2022    Readmission Risk Assessment Score:  Readmission Risk              Risk of Unplanned Readmission:  44           Discharging to Facility/ [address] [facility] at [facility] [address]  [phone]    [facility]  [address] [facility] [address]  Phone [phone]  Fax  [phone]    Dialysis Facility (if applicable)   [facility]  [address], [address] [place] [address]  Dialysis Schedule: T-TH-S at 1200  Phone:   Fax:    / signature: Electronically signed by Komal Cruz RN on 4/18/22 at 12:51 PM EDT    PHYSICIAN SECTION    Prognosis: Fair    Condition at Discharge: Stable    Rehab Potential (if transferring to Rehab): Fair    Recommended Labs or Other Treatments After Discharge:     Physician Certification: I certify the above information and transfer of Abdoulaye Hirsch  is necessary for the continuing treatment of the diagnosis listed and that she requires Home Care for greater 30 days.      Update Admission H&P: No change in H&P    PHYSICIAN SIGNATURE:  Electronically signed by Kelli Gallegos MD on 4/20/22 at 9:00 AM EDT

## 2022-04-17 NOTE — PROGRESS NOTES
Progress Note  Date:2022       Room:[facility]-  Patient Jina Hernandez     YOB: 1960     Age:62 y.o. Subjective    Subjective:  Symptoms:  (States \"I feel ick\"). Diet:  Adequate intake. Activity level: Impaired due to weakness. Pain:  She reports no pain. Review of Systems  Objective         Vitals Last 24 Hours:  TEMPERATURE:  Temp  Av.2 °F (36.8 °C)  Min: 97.9 °F (36.6 °C)  Max: 98.8 °F (37.1 °C)  RESPIRATIONS RANGE: Resp  Av.5  Min: 16  Max: 18  PULSE OXIMETRY RANGE: SpO2  Av %  Min: 91 %  Max: 95 %  PULSE RANGE: Pulse  Av.8  Min: 63  Max: 74  BLOOD PRESSURE RANGE: Systolic (91GYT), CMN:840 , Min:100 , UJ   ; Diastolic (37EKI), WCA:83, Min:35, Max:53    I/O (24Hr): Intake/Output Summary (Last 24 hours) at 2022 0815  Last data filed at 2022 1830  Gross per 24 hour   Intake 980 ml   Output 2500 ml   Net -1520 ml     Objective:  General Appearance:  Comfortable. Vital signs: (most recent): Blood pressure (!) 100/45, pulse 65, temperature 98 °F (36.7 °C), temperature source Oral, resp. rate 18, height 5' 5\" (1.651 m), weight 263 lb 7.2 oz (119.5 kg), SpO2 91 %. (BP low). Lungs:  Normal effort and normal respiratory rate. Breath sounds clear to auscultation. Heart: Normal rate.   S1 normal and S2 normal.      Labs/Imaging/Diagnostics    Labs:  CBC:  Recent Labs     04/15/22  1014   WBC 7.3   RBC 3.54*   HGB 11.0*   HCT 33.2*   MCV 93.9   RDW 15.7*        CHEMISTRIES:  Recent Labs     22  0956 22  0956 22  1601 22  1601 04/15/22  1014 22  0907 22  0625      < > 137   < > 140 136 137   K 2.7*   < > 3.2*   < > 3.9 4.0 3.8   CL 98   < > 100   < > 102 100 100   CO2 25   < > 26   < > 22 24 24   BUN 16   < > 16   < > 20 18 18   CREATININE 1.59*   < > 1.79*   < > 2.63* 2.63* 2.95*   GLUCOSE 352*   < > 168*   < > 320* 364* 167*   PHOS 2.1*  --  4.9*  --  3.5  --   --    MG 1.9  -- 2.0  --  1.9  --   --     < > = values in this interval not displayed. PT/INR:No results for input(s): PROTIME, INR in the last 72 hours. APTT:No results for input(s): APTT in the last 72 hours. LIVER PROFILE:No results for input(s): AST, ALT, BILIDIR, BILITOT, ALKPHOS in the last 72 hours. Imaging Last 24 Hours:  XR CHEST PORTABLE    Result Date: 4/15/2022  EXAMINATION: ONE XRAY VIEW OF THE CHEST 4/15/2022 9:30 am COMPARISON: 04/13/2022, 04/05/2022, 03/09/2022 HISTORY: ORDERING SYSTEM PROVIDED HISTORY: sob TECHNOLOGIST PROVIDED HISTORY: sob Reason for Exam: shortness of breath FINDINGS: Right-sided dialysis catheter is stable in appropriate in positioning. Improved pulmonary vascular congestion relative to most recent prior. Minimal strandy perihilar and bibasilar opacities. Small left pleural effusion which appears overall unchanged. No pneumothorax. Unchanged cardiomegaly with prior CABG. Cervical fusion hardware is partially imaged. No acute bony findings. Improved pulmonary vascular congestion relative to 2 days prior with minimal residual strandy perihilar and bibasilar densities which may be due to atelectasis or improving edema. Similar small left pleural effusion.      Assessment//Plan           Hospital Problems           Last Modified POA    * (Principal) Diabetic ketoacidosis without coma associated with type 2 diabetes mellitus ([address] 4/16/2022 Yes    Atherosclerosis of coronary artery bypass graft of native heart without angina pectoris 4/13/2022 Yes    Chronic diastolic heart failure ([address] 4/13/2022 Yes    Diabetic polyneuropathy associated with type 2 diabetes mellitus ([address] 4/13/2022 Yes    Mixed hyperlipidemia 4/13/2022 Yes    Type 2 diabetes mellitus with kidney complication, with long-term current use of insulin ([address] 4/13/2022 Yes    Essential hypertension 4/13/2022 Yes    Chronic ischemic heart disease 4/13/2022 Yes    Morbid obesity with BMI of 40.0-44.9, adult ([address] 4/13/2022 Yes    Long-term memory loss 4/13/2022 Yes    Anxiety 4/13/2022 Yes    Chronic midline low back pain with bilateral sciatica 4/13/2022 Yes    End-stage renal disease ([address] 4/13/2022 Yes    Hypokalemia 4/14/2022 Yes        Assessment & Plan  Diabetes - sugars improving     Chronic hypoxic respiratory failure - per pulmonology     ESRD - dialysis     Acute on chronic CHF - per nephrology      Electronically signed by Natalya Roman MD on 4/17/22 at 8:15 AM EDT

## 2022-04-17 NOTE — PROGRESS NOTES
Reviewed with dr Song Draper that pt blood sugar this AM is 167. she has the 15 U insulin lispro, plus the sliding scale 3 units. also has the 70 u lantus due. yesterday morning when she received her lantus and the 15 plus sliding scale her BS went from 364 to 168 and reported she felt sick. Yesterday nurse then held the lunch time 15 U dose.      New order to place the 15 U insulin lispro on hold and give sliding scale and lantus

## 2022-04-17 NOTE — CARE COORDINATION
ONGOING DISCHARGE PLAN:    Patient is alert and oriented x4. Spoke with patient regarding discharge plan and patient confirms that plan is still home with [facility]. Pt goes to HD at [place] [facility]. T-TH-S @ 1200. Active order for IV Rocephin and IV Diflucan    Will continue to follow for additional discharge needs.     Electronically signed by Marina Jara RN on 4/17/2022 at 9:30 AM

## 2022-04-17 NOTE — FLOWSHEET NOTE
04/17/22 1248   Encounter Summary   Services provided to: Patient   Referral/Consult From: Nhi   Continue Visiting   (4/17/22)   Complexity of Encounter Low   Length of Encounter 15 minutes   Spiritual/Jew   Type Spiritual support   Intervention Anointing   Sacraments   Sacrament of Sick-Anointing Anointed  (Brian Cota 4/17/22)

## 2022-04-17 NOTE — PROGRESS NOTES
Nephrology Progress Note    Subjective/   57y.o. year old female who we are seeing in consultation for end-stage renal disease on hemodialysis    Interval history:  Patient seen and examined denies any new complaints complains of mild shortness of breath today. She has been out of DKA protocol since    Patient is able to eat  Patient had dialysis yesterday  Hemodynamically stable today. History of Present Illness: This is a 57 y.o. female with hypertension, type 2 diabetes mellitus, end-stage renal disease on hemodialysis   who was recently taken off dialysis about 6 weeks ago due to regaining residual kidney function. Over the course of the last 2 weeks patient  was restarted on hemodialysis due to worsening fluid overload. Initially had problems with her  tunneled catheter which was replaced last week- patient was dialyzed on Saturday, 4/9/2021 and yesterday-4/13/22  at the outpatient dialysis unit. Patient presented with complaints of shortness of breath over the last 2 to 3 days progressively worsening. Patient had associated  Nausea, chest pain but no vomiting. She is on 3 L nasal cannula chronically at home. Patient was found to be in acute DKA with blood sugars greater than 500 was started on the modified DKA protocol. Blood pressures on admission were elevated above 200s and her chest x-ray showed evidence of pulmonary vascular congestion. Labs showed a potassium of 3.3 with BUN/creatinine of 18 and 1.78 mg/dL, bicarbonate of 18 and troponin of 78. Beta hydroxybutyrate was 7.57.   Objective/     Vitals:    04/17/22 0315 04/17/22 0730 04/17/22 1330 04/17/22 1909   BP:  (!) 100/45 (!) 115/49 (!) 117/45   Pulse:  65 73 69   Resp:  18 16 16   Temp:  98 °F (36.7 °C) 98.1 °F (36.7 °C) 97.6 °F (36.4 °C)   TempSrc:  Oral Oral Oral   SpO2:  91% 93% 90%   Weight: 263 lb 7.2 oz (119.5 kg)      Height:         24HR INTAKE/OUTPUT:      Intake/Output Summary (Last 24 hours) at 4/17/2022 1953  Last data filed at 4/17/2022 1850  Gross per 24 hour   Intake 1140 ml   Output --   Net 1140 ml     Patient Vitals for the past 96 hrs (Last 3 readings):   Weight   04/17/22 0315 263 lb 7.2 oz (119.5 kg)   04/16/22 1554 239 lb 13.8 oz (108.8 kg)   04/16/22 1240 244 lb 4.3 oz (110.8 kg)       Constitutional:  Alert, awake, no apparent distress  Cardiovascular:  S1, S2 without m/r/g  Respiratory:  CTA B without w/r/r  Abdomen: +bs, soft, nt  Ext: 2+ LE edema    Data/  Recent Labs     04/15/22  1014   WBC 7.3   HGB 11.0*   HCT 33.2*   MCV 93.9        Recent Labs     04/15/22  1014 04/16/22  0907 04/17/22  0625    136 137   K 3.9 4.0 3.8    100 100   CO2 22 24 24   GLUCOSE 320* 364* 167*   PHOS 3.5  --   --    MG 1.9  --   --    BUN 20 18 18   CREATININE 2.63* 2.63* 2.95*   LABGLOM 18* 18* 16*   GFRAA 22* 22* 19*         Assessment/   1. End-stage renal disease on hemodialysis by way of a right-sided tunneled catheter, transiently taken off dialysis 6 weeks ago and re- started due to worsening fluid overload 1 week ago. Patient still makes urine proximately 400 to 500 cc daily     2. Hypertensive urgency- improving     3. Acute DKA currently on DKA protocol-resolved     4. Hypokalemia #2 to osmotic diuresis and  total body depletion-improved     5. Hypophosphatemia-resolved     6. CHF with diastolic dysfunction and evidence of fluid overload pulmonary vascular congestion on chest xray     7.  Urinary tract infection with pyuria         Plan/     Will schedule her for TTS HD schedule, HD Tuesday.   Basic metabolic panel daily  Continue oral Lasix 80 mg twice daily    Gm Moran MD    Nephrologist      Gm Moran MD

## 2022-04-17 NOTE — PLAN OF CARE
Problem: Falls - Risk of:  Goal: Will remain free from falls  Description: Will remain free from falls  4/17/2022 1650 by Ernesto Stanley RN  Outcome: Ongoing   Precautions in place    Problem: Skin Integrity:  Goal: Will show no infection signs and symptoms  Description: Will show no infection signs and symptoms  4/17/2022 1650 by Ernesto Stanley RN  Outcome: Ongoing   Head to toe charted. Problem: Musculor/Skeletal Functional Status  Goal: Highest potential functional level  4/17/2022 1650 by Ernesto Stanley RN  Outcome: Ongoing   Encourage repositioning.   Pt fatigued today after multiple days of  dialysis in a row    Problem: Pain:  Goal: Pain level will decrease  Description: Pain level will decrease  4/17/2022 1650 by Ernesto Stanley RN  Outcome: Ongoing   No pain reported  this shift

## 2022-04-17 NOTE — PROGRESS NOTES
Insulin lispro 15 U scheduled held this shift per dr Perry Coon order. Only administered lantus 70 u and sliding scale insulin.      Blood sugars this shift at:    146 for breakfast  194 for lunch  214 for dinner

## 2022-04-17 NOTE — PLAN OF CARE
Problem: Falls - Risk of:  Goal: Will remain free from falls  Description: Will remain free from falls  4/17/2022 0414 by Marian Curry RN  Outcome: Ongoing  Note: Pt free of falls. Call light and bedside table within reach. Bed locked and in lowest position, nonskid socks on feet.       Problem: Falls - Risk of:  Goal: Absence of physical injury  Description: Absence of physical injury  4/17/2022 0414 by Marian Curry RN  Outcome: Ongoing  Note: Pt absent of any physical injury     Problem: Skin Integrity:  Goal: Will show no infection signs and symptoms  Description: Will show no infection signs and symptoms  4/17/2022 0414 by Marian Curry RN  Outcome: Ongoing

## 2022-04-18 ENCOUNTER — APPOINTMENT (OUTPATIENT)
Dept: MRI IMAGING | Age: 62
DRG: 637 | End: 2022-04-18
Payer: COMMERCIAL

## 2022-04-18 LAB
ALLEN TEST: ABNORMAL
AMMONIA: 17 UMOL/L (ref 11–51)
ANION GAP SERPL CALCULATED.3IONS-SCNC: 14 MMOL/L (ref 9–17)
BUN BLDV-MCNC: 26 MG/DL (ref 8–23)
CALCIUM IONIZED: 1.04 MMOL/L (ref 1.13–1.33)
CALCIUM SERPL-MCNC: 8.8 MG/DL (ref 8.6–10.4)
CARBOXYHEMOGLOBIN: 1.2 % (ref 0–5)
CHLORIDE BLD-SCNC: 99 MMOL/L (ref 98–107)
CO2: 24 MMOL/L (ref 20–31)
CREAT SERPL-MCNC: 4.32 MG/DL (ref 0.5–0.9)
FIO2: 28
GFR AFRICAN AMERICAN: 13 ML/MIN
GFR NON-AFRICAN AMERICAN: 10 ML/MIN
GFR SERPL CREATININE-BSD FRML MDRD: ABNORMAL ML/MIN/{1.73_M2}
GLUCOSE BLD-MCNC: 110 MG/DL (ref 65–105)
GLUCOSE BLD-MCNC: 114 MG/DL (ref 65–105)
GLUCOSE BLD-MCNC: 136 MG/DL (ref 65–105)
GLUCOSE BLD-MCNC: 93 MG/DL (ref 70–99)
GLUCOSE BLD-MCNC: 97 MG/DL (ref 65–105)
HCO3 ARTERIAL: 26.5 MMOL/L (ref 22–26)
HCT VFR BLD CALC: 28.6 % (ref 36–46)
HEMOGLOBIN: 9.4 G/DL (ref 12–16)
MAGNESIUM: 1.9 MG/DL (ref 1.6–2.6)
MCH RBC QN AUTO: 31.1 PG (ref 26–34)
MCHC RBC AUTO-ENTMCNC: 32.9 G/DL (ref 31–37)
MCV RBC AUTO: 94.3 FL (ref 80–100)
METHEMOGLOBIN: 1 % (ref 0–1.9)
O2 DEVICE/FLOW/%: ABNORMAL
O2 SAT, ARTERIAL: 95 % (ref 95–98)
PATIENT TEMP: 37
PCO2 ARTERIAL: 40.7 MMHG (ref 35–45)
PDW BLD-RTO: 15.5 % (ref 11.5–14.9)
PH ARTERIAL: 7.42 (ref 7.35–7.45)
PHOSPHORUS: 5.3 MG/DL (ref 2.6–4.5)
PLATELET # BLD: 94 K/UL (ref 150–450)
PMV BLD AUTO: 9.9 FL (ref 6–12)
PO2 ARTERIAL: 87.2 MMHG (ref 80–100)
POSITIVE BASE EXCESS, ART: 2.1 MMOL/L (ref 0–2)
POTASSIUM SERPL-SCNC: 3.9 MMOL/L (ref 3.7–5.3)
PT. POSITION: ABNORMAL
RBC # BLD: 3.04 M/UL (ref 4–5.2)
RESPIRATORY RATE: 17
SAMPLE SITE: ABNORMAL
SODIUM BLD-SCNC: 137 MMOL/L (ref 135–144)
WBC # BLD: 6.5 K/UL (ref 3.5–11)

## 2022-04-18 PROCEDURE — 70551 MRI BRAIN STEM W/O DYE: CPT

## 2022-04-18 PROCEDURE — 2580000003 HC RX 258: Performed by: INTERNAL MEDICINE

## 2022-04-18 PROCEDURE — 85027 COMPLETE CBC AUTOMATED: CPT

## 2022-04-18 PROCEDURE — 6370000000 HC RX 637 (ALT 250 FOR IP): Performed by: INTERNAL MEDICINE

## 2022-04-18 PROCEDURE — 36600 WITHDRAWAL OF ARTERIAL BLOOD: CPT

## 2022-04-18 PROCEDURE — 2580000003 HC RX 258: Performed by: STUDENT IN AN ORGANIZED HEALTH CARE EDUCATION/TRAINING PROGRAM

## 2022-04-18 PROCEDURE — 2060000000 HC ICU INTERMEDIATE R&B

## 2022-04-18 PROCEDURE — 93005 ELECTROCARDIOGRAM TRACING: CPT | Performed by: INTERNAL MEDICINE

## 2022-04-18 PROCEDURE — 6360000002 HC RX W HCPCS: Performed by: FAMILY MEDICINE

## 2022-04-18 PROCEDURE — 82140 ASSAY OF AMMONIA: CPT

## 2022-04-18 PROCEDURE — 99253 IP/OBS CNSLTJ NEW/EST LOW 45: CPT | Performed by: PSYCHIATRY & NEUROLOGY

## 2022-04-18 PROCEDURE — 97530 THERAPEUTIC ACTIVITIES: CPT

## 2022-04-18 PROCEDURE — 36415 COLL VENOUS BLD VENIPUNCTURE: CPT

## 2022-04-18 PROCEDURE — 6370000000 HC RX 637 (ALT 250 FOR IP): Performed by: FAMILY MEDICINE

## 2022-04-18 PROCEDURE — 80048 BASIC METABOLIC PNL TOTAL CA: CPT

## 2022-04-18 PROCEDURE — 6360000002 HC RX W HCPCS: Performed by: INTERNAL MEDICINE

## 2022-04-18 PROCEDURE — 6370000000 HC RX 637 (ALT 250 FOR IP): Performed by: PSYCHIATRY & NEUROLOGY

## 2022-04-18 PROCEDURE — 82330 ASSAY OF CALCIUM: CPT

## 2022-04-18 PROCEDURE — 84100 ASSAY OF PHOSPHORUS: CPT

## 2022-04-18 PROCEDURE — 70544 MR ANGIOGRAPHY HEAD W/O DYE: CPT

## 2022-04-18 PROCEDURE — 82805 BLOOD GASES W/O2 SATURATION: CPT

## 2022-04-18 PROCEDURE — 83735 ASSAY OF MAGNESIUM: CPT

## 2022-04-18 PROCEDURE — 2500000003 HC RX 250 WO HCPCS: Performed by: INTERNAL MEDICINE

## 2022-04-18 PROCEDURE — 82947 ASSAY GLUCOSE BLOOD QUANT: CPT

## 2022-04-18 RX ORDER — SODIUM CHLORIDE 9 MG/ML
INJECTION, SOLUTION INTRAVENOUS CONTINUOUS
Status: ACTIVE | OUTPATIENT
Start: 2022-04-18 | End: 2022-04-19

## 2022-04-18 RX ORDER — ASPIRIN 81 MG/1
81 TABLET ORAL DAILY
Status: DISCONTINUED | OUTPATIENT
Start: 2022-04-19 | End: 2022-04-28 | Stop reason: HOSPADM

## 2022-04-18 RX ORDER — POLYETHYLENE GLYCOL 3350 17 G/17G
17 POWDER, FOR SOLUTION ORAL DAILY PRN
Status: DISCONTINUED | OUTPATIENT
Start: 2022-04-18 | End: 2022-04-28 | Stop reason: HOSPADM

## 2022-04-18 RX ORDER — CALCIUM GLUCONATE 10 MG/ML
1000 INJECTION, SOLUTION INTRAVENOUS ONCE
Status: COMPLETED | OUTPATIENT
Start: 2022-04-18 | End: 2022-04-18

## 2022-04-18 RX ADMIN — DOCUSATE SODIUM 100 MG: 100 CAPSULE, LIQUID FILLED ORAL at 08:15

## 2022-04-18 RX ADMIN — ATORVASTATIN CALCIUM 80 MG: 80 TABLET, FILM COATED ORAL at 08:14

## 2022-04-18 RX ADMIN — TRAZODONE HYDROCHLORIDE 50 MG: 50 TABLET ORAL at 23:54

## 2022-04-18 RX ADMIN — ASPIRIN 325 MG: 325 TABLET, COATED ORAL at 16:59

## 2022-04-18 RX ADMIN — BUSPIRONE HYDROCHLORIDE 7.5 MG: 5 TABLET ORAL at 08:14

## 2022-04-18 RX ADMIN — SODIUM BICARBONATE 650 MG: 650 TABLET ORAL at 14:37

## 2022-04-18 RX ADMIN — FLUCONAZOLE 200 MG: 200 INJECTION, SOLUTION INTRAVENOUS at 14:47

## 2022-04-18 RX ADMIN — HEPARIN SODIUM 5000 UNITS: 5000 INJECTION INTRAVENOUS; SUBCUTANEOUS at 23:52

## 2022-04-18 RX ADMIN — CALCIUM GLUCONATE 1000 MG: 10 INJECTION, SOLUTION INTRAVENOUS at 17:24

## 2022-04-18 RX ADMIN — RANOLAZINE 1000 MG: 1000 TABLET, FILM COATED, EXTENDED RELEASE ORAL at 20:05

## 2022-04-18 RX ADMIN — SODIUM CHLORIDE, PRESERVATIVE FREE 10 ML: 5 INJECTION INTRAVENOUS at 20:05

## 2022-04-18 RX ADMIN — FUROSEMIDE 80 MG: 40 TABLET ORAL at 16:59

## 2022-04-18 RX ADMIN — FEBUXOSTAT 40 MG: 40 TABLET, FILM COATED ORAL at 08:16

## 2022-04-18 RX ADMIN — SODIUM CHLORIDE, PRESERVATIVE FREE 10 ML: 5 INJECTION INTRAVENOUS at 08:17

## 2022-04-18 RX ADMIN — SODIUM BICARBONATE 650 MG: 650 TABLET ORAL at 08:15

## 2022-04-18 RX ADMIN — ASPIRIN 81 MG: 81 TABLET, CHEWABLE ORAL at 08:14

## 2022-04-18 RX ADMIN — ANTI-FUNGAL POWDER MICONAZOLE NITRATE TALC FREE: 1.42 POWDER TOPICAL at 20:11

## 2022-04-18 RX ADMIN — TAMSULOSIN HYDROCHLORIDE 0.4 MG: 0.4 CAPSULE ORAL at 08:15

## 2022-04-18 RX ADMIN — HEPARIN SODIUM 5000 UNITS: 5000 INJECTION INTRAVENOUS; SUBCUTANEOUS at 14:38

## 2022-04-18 RX ADMIN — HEPARIN SODIUM 5000 UNITS: 5000 INJECTION INTRAVENOUS; SUBCUTANEOUS at 05:35

## 2022-04-18 RX ADMIN — CARVEDILOL 3.12 MG: 3.12 TABLET, FILM COATED ORAL at 08:14

## 2022-04-18 RX ADMIN — CEFTRIAXONE SODIUM 2000 MG: 2 INJECTION, POWDER, FOR SOLUTION INTRAMUSCULAR; INTRAVENOUS at 23:56

## 2022-04-18 RX ADMIN — Medication 9 MG: at 23:53

## 2022-04-18 RX ADMIN — BUSPIRONE HYDROCHLORIDE 7.5 MG: 5 TABLET ORAL at 20:05

## 2022-04-18 RX ADMIN — BUSPIRONE HYDROCHLORIDE 7.5 MG: 5 TABLET ORAL at 14:37

## 2022-04-18 RX ADMIN — SODIUM BICARBONATE 650 MG: 650 TABLET ORAL at 20:06

## 2022-04-18 RX ADMIN — DOCUSATE SODIUM 100 MG: 100 CAPSULE, LIQUID FILLED ORAL at 20:05

## 2022-04-18 RX ADMIN — GABAPENTIN 300 MG: 300 CAPSULE ORAL at 20:05

## 2022-04-18 RX ADMIN — RANOLAZINE 1000 MG: 1000 TABLET, FILM COATED, EXTENDED RELEASE ORAL at 08:16

## 2022-04-18 RX ADMIN — SODIUM CHLORIDE: 9 INJECTION, SOLUTION INTRAVENOUS at 12:15

## 2022-04-18 RX ADMIN — PANTOPRAZOLE SODIUM 40 MG: 40 TABLET, DELAYED RELEASE ORAL at 05:35

## 2022-04-18 RX ADMIN — INSULIN GLARGINE 70 UNITS: 100 INJECTION, SOLUTION SUBCUTANEOUS at 20:06

## 2022-04-18 RX ADMIN — INSULIN GLARGINE 70 UNITS: 100 INJECTION, SOLUTION SUBCUTANEOUS at 08:16

## 2022-04-18 RX ADMIN — ANTI-FUNGAL POWDER MICONAZOLE NITRATE TALC FREE: 1.42 POWDER TOPICAL at 08:22

## 2022-04-18 RX ADMIN — FUROSEMIDE 80 MG: 40 TABLET ORAL at 08:15

## 2022-04-18 RX ADMIN — GABAPENTIN 300 MG: 300 CAPSULE ORAL at 08:15

## 2022-04-18 RX ADMIN — CARVEDILOL 3.12 MG: 3.12 TABLET, FILM COATED ORAL at 20:05

## 2022-04-18 NOTE — PLAN OF CARE
Problem: Falls - Risk of:  Goal: Will remain free from falls  Description: Will remain free from falls  4/18/2022 1602 by Bethany Manley RN  Outcome: Ongoing  4/18/2022 0355 by Yonatan Bustamante RN  Outcome: Ongoing  Note: Pt free of falls. Call light and bedside table within reach. Bed locked and in lowest position,.   Goal: Absence of physical injury  Description: Absence of physical injury  4/18/2022 1602 by Bethany Manley RN  Outcome: Ongoing  4/18/2022 0355 by Yonatan Bustamante RN  Outcome: Ongoing  Note: Pt absent of any physical injury     Problem: Skin Integrity:  Goal: Will show no infection signs and symptoms  Description: Will show no infection signs and symptoms  4/18/2022 1602 by Bethany Manley RN  Outcome: Ongoing  4/18/2022 0355 by Yonatan Bustamante RN  Outcome: Ongoing  Goal: Absence of new skin breakdown  Description: Absence of new skin breakdown  4/18/2022 1602 by Bethany Manley RN  Outcome: Ongoing  4/18/2022 0355 by Yonatan Bustamante RN  Outcome: Ongoing  Note: Pt absent from any new skin breakdown       Problem: Musculor/Skeletal Functional Status  Goal: Highest potential functional level  4/18/2022 1602 by Bethany Manley RN  Outcome: Ongoing  4/18/2022 0355 by Yonatan Bustamante RN  Outcome: Ongoing  Goal: Absence of falls  4/18/2022 1602 by Bethany Manley RN  Outcome: Ongoing  4/18/2022 0355 by Yonatan Bustamante RN  Outcome: Ongoing     Problem: Pain:  Goal: Pain level will decrease  Description: Pain level will decrease  4/18/2022 1602 by Bethany Manley RN  Outcome: Ongoing  4/18/2022 0355 by Yonatan Bustamante RN  Outcome: Ongoing  Goal: Control of acute pain  Description: Control of acute pain  4/18/2022 1602 by Bethany Manley RN  Outcome: Ongoing  4/18/2022 0355 by Yonatan Bustamante RN  Outcome: Ongoing  Goal: Control of chronic pain  Description: Control of chronic pain  4/18/2022 1602 by Bethany Manley RN  Outcome: Ongoing  4/18/2022 0355 by Yonatan Bustamante RN  Outcome: Ongoing

## 2022-04-18 NOTE — PLAN OF CARE
Problem: Falls - Risk of:  Goal: Will remain free from falls  Description: Will remain free from falls  4/18/2022 0355 by Taylor Tomlin RN  Outcome: Ongoing  Note: Pt free of falls. Call light and bedside table within reach. Bed locked and in lowest position,.      Problem: Falls - Risk of:  Goal: Absence of physical injury  Description: Absence of physical injury  4/18/2022 0355 by Taylor Tomlin RN  Outcome: Ongoing  Note: Pt absent of any physical injury     Problem: Skin Integrity:  Goal: Absence of new skin breakdown  Description: Absence of new skin breakdown  4/18/2022 0355 by Taylor Tomlin RN  Outcome: Ongoing  Note: Pt absent from any new skin breakdown

## 2022-04-18 NOTE — FLOWSHEET NOTE
Fr Shaw 4/18/22 blessed over PT for stroke alert.      04/18/22 1203   Encounter Summary   Services provided to: Patient   Referral/Consult From: Multi-disciplinary team   Continue Visiting   (4-18-22)   Complexity of Encounter Low   Length of Encounter 15 minutes   Crisis   Type Stroke Alert   Intervention [place]

## 2022-04-18 NOTE — SIGNIFICANT EVENT
Jason Bhatt MD/Carter Tavares MD/ Anthony So MD/Jordan SHELBY AGACNP-BC, NP-C      Rahul SHELBY NP-C     Victor Hugo SHELBY NP-C              Patient had been working with physical therapy, and felt very dizzy when sitting up at the edge of bed, and became unresponsive. Rapid response was called. I did arrive at the bedside, she was breathing spontaneously, pulse ox 97% on 2.5 L. Heart rate was 70-80 bpm. Blood sugar was reasonable. She was not responding to any commands, likely had orthostatic hypotensive event. Dr. Cristine Gomez did arrive at the bedside as well, felt if blood pressure was reasonable we should call a stroke alert, as it was taking her quite a while to respond, she was starting to open up her eyes spontaneously, she was able to squeeze hands bilaterally. Neurology did arrive at the bedside, did not feel that she had a stroke events. Did not feel it was necessary for CT of the brain. Her sister was also at the bedside, after 10-15 minutes, she was able to look around the room, and respond to her sister. She was able to voice \"am I dying\" and answer a few questions. We were able to check her blood pressure, ×2 was in the 120s bedside nurses were contacting primary care. Dr. Cristine Gomez did request ABG. Her vital signs were stable throughout the entire events. CCT 35 min, including prior exam. Updated and discussed with her sister at bedside, updated Dr Justa Gonzalez with neurology as well.

## 2022-04-18 NOTE — PROGRESS NOTES
Jason Bhatt MD/Carter Ortiz MD/ Cholo Portillo MD/Jordan Mcintyre APRN GEORGE-BC, NP-C      Juno Armandobertha APRN NP-C     Meche Vragas APRN NP-C                                           Pulmonary Progress Note    Patient - Terry Galeas   Age - 57 y.o.   - 1960  MRN - [id number]  Acct # - [de-identified]  Date of Admission - 2022  3:17 PM    Consulting Service/Physician:       Primary Care Physician: Kriss Arriaga APRANAHY - CNP    SUBJECTIVE:     Chief Complaint:   Chief Complaint   Patient presents with    Shortness of Breath     Subjective:    She still feels fairly symptomatic with breathlessness with activity, she states she also gets palpitations during these episodes, she denies any chest pain, no cough or phlegm, still with LE edema, but improved. She is normally on 3L of oxygen 24/7, currently on 2.5L    VITALS  BP (!) 132/59   Pulse 74   Temp 98.3 °F (36.8 °C) (Oral)   Resp 18   Ht 5' 5\" (1.651 m)   Wt 265 lb 14 oz (120.6 kg)   SpO2 91%   BMI 44.24 kg/m²   Wt Readings from Last 3 Encounters:   22 265 lb 14 oz (120.6 kg)   22 248 lb (112.5 kg)   22 250 lb (113.4 kg)     I/O (24 Hours)    Intake/Output Summary (Last 24 hours) at 2022 0942  Last data filed at 2022 1850  Gross per 24 hour   Intake 900 ml   Output --   Net 900 ml     Ventilator:      Exam:   Physical Exam   Constitutional:  Oriented to person, place, and time. Appears well-developed and well-nourished. Lying in bed in no distress  HENT: Unremarkable, nasal cannula in place  Head: Normocephalic and atraumatic. Eyes: EOM are normal. Pupils are equal, round, and reactive to light. Neck: Neck supple. Cardiovascular:  Regular rate and rhythm. Normal heart tones. No JVD. Pulmonary/Chest: Effort normal and breath sounds diminished due to body habitus, not in distress, on 2.5L  Abdominal: Obese Soft.  Bowel sounds are normal.   Musculoskeletal: Normal range of motion. Neurological:patient is alert and oriented to person, place, and time. Skin: Skin is warm and dry.    Extremities: BLE 1+ edema  Infusions:      sodium chloride       Meds:     Current Facility-Administered Medications:     polyethylene glycol (GLYCOLAX) packet 17 g, 17 g, Oral, Daily PRN, Gus Lesch, MD    insulin glargine (LANTUS) injection vial 70 Units, 70 Units, SubCUTAneous, BID, Theo Holbrook MD, 70 Units at 04/18/22 0816    insulin lispro (HUMALOG) injection vial 0-18 Units, 0-18 Units, SubCUTAneous, TID WC, Theo Holbrook MD, 6 Units at 04/17/22 1702    insulin lispro (HUMALOG) injection vial 0-9 Units, 0-9 Units, SubCUTAneous, Nightly, Theo Holbrook MD, 5 Units at 04/17/22 2026    sodium chloride flush 0.9 % injection 5-40 mL, 5-40 mL, IntraVENous, 2 times per day, Alemanoj Simmsard, DO, 10 mL at 04/18/22 0817    sodium chloride flush 0.9 % injection 5-40 mL, 5-40 mL, IntraVENous, PRN, Aleatha Blizzard, DO    0.9 % sodium chloride infusion, , IntraVENous, PRN, Aleatha Blizzard, DO    ondansetron (ZOFRAN-ODT) disintegrating tablet 4 mg, 4 mg, Oral, Q8H PRN **OR** ondansetron (ZOFRAN) injection 4 mg, 4 mg, IntraVENous, Q6H PRN, Aleatha Blizzard, DO    aspirin chewable tablet 81 mg, 81 mg, Oral, Daily, Genesis Bowens MD, 81 mg at 04/18/22 0814    ranolazine (RANEXA) extended release tablet 1,000 mg, 1,000 mg, Oral, BID, Genesis Bowens MD, 1,000 mg at 04/18/22 0816    busPIRone (BUSPAR) tablet 7.5 mg, 7.5 mg, Oral, TID, Genesis Bowens MD, 7.5 mg at 04/18/22 0814    pantoprazole (PROTONIX) tablet 40 mg, 40 mg, Oral, QAM AC, Genesis Bowens MD, 40 mg at 04/18/22 0535    atorvastatin (LIPITOR) tablet 80 mg, 80 mg, Oral, Daily, Genesis Bowens MD, 80 mg at 04/18/22 0814    febuxostat (ULORIC) tablet 40 mg, 40 mg, Oral, Daily, Genesis Bowens MD, 40 mg at 04/18/22 0816    docusate sodium (COLACE) capsule 100 mg, 100 mg, Oral, BID, Ashley Camarena MD, 100 mg at 04/18/22 0815    tamsulosin (FLOMAX) capsule 0.4 mg, 0.4 mg, Oral, Daily, Ashley Camarena MD, 0.4 mg at 04/18/22 0815    [Held by provider] insulin lispro (HUMALOG) injection vial 15 Units, 15 Units, SubCUTAneous, TID AC, Ashley Camarena MD, 15 Units at 04/16/22 1713    sodium bicarbonate tablet 650 mg, 650 mg, Oral, TID, Ashley Camarena MD, 650 mg at 04/18/22 0815    furosemide (LASIX) tablet 80 mg, 80 mg, Oral, BID, Ashley Camarena MD, 80 mg at 04/18/22 0815    traZODone (DESYREL) tablet 50 mg, 50 mg, Oral, Nightly, Ashley Camarena MD, 50 mg at 04/17/22 2228    gabapentin (NEURONTIN) capsule 300 mg, 300 mg, Oral, BID, Ashley Camarena MD, 300 mg at 04/18/22 0815    melatonin tablet 9 mg, 9 mg, Oral, Nightly, Ashley Camarena MD, 9 mg at 04/17/22 2228    lidocaine-prilocaine (EMLA) cream, , Topical, PRN, Ashley Camarena MD    carvedilol (COREG) tablet 3.125 mg, 3.125 mg, Oral, BID, Ashley Camarena MD, 3.125 mg at 04/18/22 0814    heparin (porcine) injection 5,000 Units, 5,000 Units, SubCUTAneous, 3 times per day, Ashley Camarena MD, 5,000 Units at 04/18/22 0535    sodium phosphate 10 mmol in dextrose 5 % 250 mL IVPB, 10 mmol, IntraVENous, Once, Earl Medel MD    fluconazole (DIFLUCAN) in 0.9 % sodium chloride IVPB 200 mg, 200 mg, IntraVENous, Q24H, Lala Barkley MD, Last Rate: 100 mL/hr at 04/17/22 1320, 200 mg at 04/17/22 1320    cefTRIAXone (ROCEPHIN) 2000 mg IVPB in D5W 50ml minibag, 2,000 mg, IntraVENous, Q24H, Lala Barkley MD, Stopped at 04/17/22 2259    anticoagulant sodium citrate 4 % injection 1.6 mL, 1.6 mL, IntraCATHeter, PRN, Earl Medel MD, 1.6 mL at 04/16/22 1546    anticoagulant sodium citrate 4 % injection 1.6 mL, 1.6 mL, IntraCATHeter, PRN, Earl Medel MD, 1.6 mL at 04/16/22 1546    glucose (GLUTOSE) 40 % oral gel 15 g, 15 g, Oral, PRN, Ashley Camarena MD    dextrose 50 % IV solution, 12.5 g, IntraVENous, PRN, Valdemar Haider MD    glucagon (rDNA) injection 1 mg, 1 mg, IntraMUSCular, PRN, Valdemar Haider MD    miconazole (MICOTIN) 2 % powder, , Topical, BID, Valdemar Haider MD, Given at 04/18/22 9591    acetaminophen (TYLENOL) tablet 650 mg, 650 mg, Oral, Q4H PRN, Prosperhardik Lloyd, DO, 650 mg at 04/16/22 0713    Lab Results:     Lab Results   Component Value Date    WBC 7.3 04/15/2022    HGB 11.0 (L) 04/15/2022    HCT 33.2 (L) 04/15/2022    MCV 93.9 04/15/2022     04/15/2022     Lab Results   Component Value Date    CALCIUM 8.8 04/18/2022     04/18/2022    K 3.9 04/18/2022    CO2 24 04/18/2022    CL 99 04/18/2022    BUN 26 (H) 04/18/2022    CREATININE 4.32 (H) 04/18/2022       Lab Results   Component Value Date    INR 0.9 11/10/2021    PROTIME 12.7 11/10/2021     2D echo 4/13/22  Left ventricle is normal in size. Moderate left ventricular hypertrophy. Global left ventricular systolic function is normal. Estimated LV EF >55 %. No obvious wall motion abnormality seen. Evidence of mild (grade I)  diastolic dysfunction. Left atrium is normal in size. Mild tricuspid regurgitation. No pulmonary hypertension. Estimated right ventricular systolic pressure is  86ANLY. Radiology:   No chest imaging today    ASSESSMENT:       1. RLL pneumonia/atelectasis with fluid volume excess  2. DKA - resolved  3. HTN urgency  4. UTI- on Ceftriaxone, culture with no signficant growth  5. ESRD on hemodialysis  6. Acute on Chronic diastolic heart failure-Grade 1  7. Chronic hypoxic respiratory insufficiency  8. ERICK (clinical diagnosis)  9. Obesity  PLAN:   1. UTI per primary  2. On home dose of oxygen  3. Likely needs sleep study as an outpatient  4. Increase activity  5. Fluid removal per nephrology, hemodialysis sched for today  6. Repeat CXR in am  7.  D/w RN      Electronically signed by AFSANEH Spain CNP on 04/18/22     This progress note was completed using a voice transcription system. Every effort was made to ensure accuracy. However, inadvertent computerized transcription errors may be present.     SACHA VAZQUEZ-BC, NP-C  [facility] Pulmonary, Critical Care & Sleep

## 2022-04-18 NOTE — PROGRESS NOTES
Notified that patient is overall better but had some focal symptoms once she woke up to include double vision and some right arm numbness. Again, I suspect that the initial mechanism of her spacing out once she sat up was orthostatic hypotension, and she did wake up when we made her flat however she did continue to be quite altered and sleepy even while having decent recumbent blood pressures of systolic of 910. I think there is another process at work. Given the reported double vision and right arm numbness, I am wondering if there may be a continued component of vertebrobasilar insufficiency. Will put on higher dose of ASA and obtain additional MRI and MRA (TOF w/o AMOL- given renal failure)    Recommend avoiding hypotension pending this evalutaion. Jorge Joe. Herman De Jesus M.D.   Clinical Neurophysiologist  Neuromuscular Medicine

## 2022-04-18 NOTE — PROGRESS NOTES
Family Reagan Comas was present during rapid response.     130 pm - elias updated via phone on pt's progress/status

## 2022-04-18 NOTE — PROGRESS NOTES
Progress Note  Date:2022       Room:[address] [place] [address]-  Patient Mehul Rodas     YOB: 1960     Age:62 y.o. Subjective    Subjective:  Symptoms:  Improved. Diet:  Adequate intake. Activity level: Impaired due to weakness. Pain:  She reports no pain. Review of Systems  Objective         Vitals Last 24 Hours:  TEMPERATURE:  Temp  Av °F (36.7 °C)  Min: 97.6 °F (36.4 °C)  Max: 98.2 °F (36.8 °C)  RESPIRATIONS RANGE: Resp  Av  Min: 16  Max: 16  PULSE OXIMETRY RANGE: SpO2  Av.3 %  Min: 90 %  Max: 93 %  PULSE RANGE: Pulse  Av.5  Min: 68  Max: 76  BLOOD PRESSURE RANGE: Systolic (72UFM), QIK:863 , Min:113 , CAM:891   ; Diastolic (95YJO), ZSK:63, Min:45, Max:53    I/O (24Hr): Intake/Output Summary (Last 24 hours) at 2022 0744  Last data filed at 2022 1850  Gross per 24 hour   Intake 1140 ml   Output --   Net 1140 ml     Objective:  General Appearance:  Comfortable. Vital signs: (most recent): Blood pressure (!) 113/53, pulse 68, temperature 98.2 °F (36.8 °C), temperature source Oral, resp. rate 16, height 5' 5\" (1.651 m), weight 265 lb 14 oz (120.6 kg), SpO2 91 %. Vital signs are normal.    Lungs:  Normal effort and normal respiratory rate. Breath sounds clear to auscultation. Heart: Normal rate. Regular rhythm. S1 normal and S2 normal.      Labs/Imaging/Diagnostics    Labs:  CBC:  Recent Labs     04/15/22  1014   WBC 7.3   RBC 3.54*   HGB 11.0*   HCT 33.2*   MCV 93.9   RDW 15.7*        CHEMISTRIES:  Recent Labs     04/15/22  1014 04/15/22  1014 22  0907 22  0625 22  0618      < > 136 137 137   K 3.9   < > 4.0 3.8 3.9      < > 100 100 99   CO2 22   < > 24 24 24   BUN 20   < > 18 18 26*   CREATININE 2.63*   < > 2.63* 2.95* 4.32*   GLUCOSE 320*   < > 364* 167* 93   PHOS 3.5  --   --   --   --    MG 1.9  --   --   --   --     < > = values in this interval not displayed.      PT/INR:No results for input(s): PROTIME, INR in the last 72 hours. APTT:No results for input(s): APTT in the last 72 hours. LIVER PROFILE:No results for input(s): AST, ALT, BILIDIR, BILITOT, ALKPHOS in the last 72 hours. Imaging Last 24 Hours:  No results found.   Assessment//Plan           Hospital Problems           Last Modified POA    * (Principal) Diabetic ketoacidosis without coma associated with type 2 diabetes mellitus ([address] 4/16/2022 Yes    Atherosclerosis of coronary artery bypass graft of native heart without angina pectoris 4/13/2022 Yes    Chronic diastolic heart failure ([address] 4/13/2022 Yes    Diabetic polyneuropathy associated with type 2 diabetes mellitus ([address] 4/13/2022 Yes    Mixed hyperlipidemia 4/13/2022 Yes    Type 2 diabetes mellitus with kidney complication, with long-term current use of insulin ([address] 4/13/2022 Yes    Essential hypertension 4/13/2022 Yes    Chronic ischemic heart disease 4/13/2022 Yes    Morbid obesity with BMI of 40.0-44.9, adult ([address] 4/13/2022 Yes    Long-term memory loss 4/13/2022 Yes    Anxiety 4/13/2022 Yes    Chronic midline low back pain with bilateral sciatica 4/13/2022 Yes    End-stage renal disease ([address] 4/13/2022 Yes    Hypokalemia 4/14/2022 Yes        Assessment & Plan  Continue current management      Electronically signed by Natalya Roman MD on 4/18/22 at 7:44 AM EDT

## 2022-04-18 NOTE — CONSULTS
Neurology Consult Note        Reason for Consult:  Code stroke, altered mental status  Requesting Physician:  Dr Reid Hobbs:  Loss of consciousness    History Obtained From:  patient, electronic medical record       HISTORY OF PRESENT ILLNESS:    This is a 56 y/o WF with significant medical problems to include CHF, ESRD, CAD and lymphedema and she is admitted mostly for management of her fluid status and renal failure. I came to urgently evaluate her when I overheard and overhead inpatient stroke alert. Reportedly she was sitting at the side of the bed and then developed a blank stare and became unresponsive. She was placed back in bed, semi-recumbent and continued to have minimal responsiveness. There was no reported gaze deviation, unilateral weakness or seizure activity. Her SBP in this semi-recumbent position was about 120. I lowered the head of the bed to flat and she did become a bit more responsive and opened her eyes and could follow some minimal commands. About 1 hour later I did re-evaluate her and found her still very sleepy appearing but she would make eye contact and follow some commands. Upper extremities appear to have equal antigravity strength but there is some asterixis noted. Subjective: patient states she is tire.     Past Medical History:        Diagnosis Date    Backache, unspecified     CHF (congestive heart failure) (HCC)     Chronic kidney disease     Coronary atherosclerosis of artery bypass graft     COVID 1/31/2022    Cramp of limb     Gallstones     Hypertension     Insomnia     Pneumonia     Type II or unspecified type diabetes mellitus with renal manifestations, not stated as uncontrolled(250.40)     Type II or unspecified type diabetes mellitus without mention of complication, not stated as uncontrolled     Unspecified vitamin D deficiency      Past Surgical History:        Procedure Laterality Date    ANKLE FRACTURE SURGERY      AV FISTULA CREATION  12/14/2021    BREAST SURGERY      CARPAL TUNNEL RELEASE      CHOLECYSTECTOMY, OPEN N/A     CORONARY ARTERY BYPASS GRAFT      x3    DIALYSIS FISTULA CREATION Left 12/14/2021    LEFT AV FISTULA CREATION UPPER EXTREMITY performed by Josefina Mane MD at [address] [facility] IR TUNNELED CATHETER PLACEMENT GREATER THAN 5 YEARS  8/18/2021    IR TUNNELED CATHETER PLACEMENT GREATER THAN 5 YEARS 8/18/2021 [facility] SPECIAL PROCEDURES    KNEE ARTHROSCOPY      right    OTHER SURGICAL HISTORY  04/06/2022    cvc exchange    TONSILLECTOMY       Current Medications:   Current Facility-Administered Medications: polyethylene glycol (GLYCOLAX) packet 17 g, 17 g, Oral, Daily PRN  0.9 % sodium chloride infusion, , IntraVENous, Continuous  insulin glargine (LANTUS) injection vial 70 Units, 70 Units, SubCUTAneous, BID  insulin lispro (HUMALOG) injection vial 0-18 Units, 0-18 Units, SubCUTAneous, TID WC  insulin lispro (HUMALOG) injection vial 0-9 Units, 0-9 Units, SubCUTAneous, Nightly  sodium chloride flush 0.9 % injection 5-40 mL, 5-40 mL, IntraVENous, 2 times per day  sodium chloride flush 0.9 % injection 5-40 mL, 5-40 mL, IntraVENous, PRN  0.9 % sodium chloride infusion, , IntraVENous, PRN  ondansetron (ZOFRAN-ODT) disintegrating tablet 4 mg, 4 mg, Oral, Q8H PRN **OR** ondansetron (ZOFRAN) injection 4 mg, 4 mg, IntraVENous, Q6H PRN  aspirin chewable tablet 81 mg, 81 mg, Oral, Daily  ranolazine (RANEXA) extended release tablet 1,000 mg, 1,000 mg, Oral, BID  busPIRone (BUSPAR) tablet 7.5 mg, 7.5 mg, Oral, TID  pantoprazole (PROTONIX) tablet 40 mg, 40 mg, Oral, QAM AC  atorvastatin (LIPITOR) tablet 80 mg, 80 mg, Oral, Daily  febuxostat (ULORIC) tablet 40 mg, 40 mg, Oral, Daily  docusate sodium (COLACE) capsule 100 mg, 100 mg, Oral, BID  tamsulosin (FLOMAX) capsule 0.4 mg, 0.4 mg, Oral, Daily  [Held by provider] insulin lispro (HUMALOG) injection vial 15 Units, 15 Units, SubCUTAneous, TID AC  sodium bicarbonate tablet 650 mg, 650 mg, Oral, TID  furosemide (LASIX) tablet 80 mg, 80 mg, Oral, BID  traZODone (DESYREL) tablet 50 mg, 50 mg, Oral, Nightly  gabapentin (NEURONTIN) capsule 300 mg, 300 mg, Oral, BID  melatonin tablet 9 mg, 9 mg, Oral, Nightly  lidocaine-prilocaine (EMLA) cream, , Topical, PRN  carvedilol (COREG) tablet 3.125 mg, 3.125 mg, Oral, BID  heparin (porcine) injection 5,000 Units, 5,000 Units, SubCUTAneous, 3 times per day  sodium phosphate 10 mmol in dextrose 5 % 250 mL IVPB, 10 mmol, IntraVENous, Once  fluconazole (DIFLUCAN) in 0.9 % sodium chloride IVPB 200 mg, 200 mg, IntraVENous, Q24H  cefTRIAXone (ROCEPHIN) 2000 mg IVPB in D5W 50ml minibag, 2,000 mg, IntraVENous, Q24H  anticoagulant sodium citrate 4 % injection 1.6 mL, 1.6 mL, IntraCATHeter, PRN  anticoagulant sodium citrate 4 % injection 1.6 mL, 1.6 mL, IntraCATHeter, PRN  glucose (GLUTOSE) 40 % oral gel 15 g, 15 g, Oral, PRN  dextrose 50 % IV solution, 12.5 g, IntraVENous, PRN  glucagon (rDNA) injection 1 mg, 1 mg, IntraMUSCular, PRN  miconazole (MICOTIN) 2 % powder, , Topical, BID  acetaminophen (TYLENOL) tablet 650 mg, 650 mg, Oral, Q4H PRN  Allergies:  Adhesive tape, Ace inhibitors, Iv dye [iodides], Nsaids, and Metformin and related    Social History:  TOBACCO:   reports that she has never smoked. She has never used smokeless tobacco.  ETOH:   reports no history of alcohol use. DRUGS:   reports no history of drug use.         Family History:       Problem Relation Age of Onset    Diabetes Father     Heart Failure Father        REVIEW OF SYSTEMS:  Review of systems not obtained due to patient factors - mental status    PHYSICAL EXAM:    Vitals:  BP (!) 116/55   Pulse 73   Temp 98.3 °F (36.8 °C) (Oral)   Resp 18   Ht 5' 5\" (1.651 m)   Wt 265 lb 14 oz (120.6 kg)   SpO2 97%   BMI 44.24 kg/m²      General Exam:  Overweight body habitus, no acute distress    HEENT:  Normocephalic, atraumatic  Eyes: conjunctiva non-injected, sclera anicteric  Mucous membranes of normal color and hydration status    Neurologic exam:     Mental status: Somnolent but will make eye contact and will respond slowly to question. No overt visuospatial neglect or gaze preference  Language with normal fluency and comprehension to simple commands. Cranial nerves:  Pupils equal round and reactive to light, no eyelid ptosis  Extraocular movements: conjugate  Face is symmetric to movement  Hearing is intact to conversation  Tongue midline, no dysarthria or dysphonia    Motor exam:  >4 in upper extremities, negative myoclonus noted      DATA  Labs reviewed    IMPRESSION:   This is a 58 y/o WF with multiple medical issues who is admitted mostly for management of CHF, lymphedema and renal failure. She became unresponsive while sitting up. Most likely this was (in part) an orthostatically triggered event. Her blood pressures were a bit on the soft side when recumbent and were certainly lower when she was sitting up. She is also on flomax in the mornings and this is notorious for causing orthostatic hypotension. She however remains quite somnolent but is improved from when I first saw her. I am not concerned for a primary CNS event. Her current appearance suggests a metabolic encephalopathy/. RECOMMENDATIONS:   1. No need for additional neuroimaging at this time  2. Will send of CBC and ammonia  3, Will continue to follow  4. Consider d/c'ing flomax or at least moving to evening dosing            Radha Camara. Princess Mckeon M.D.   Clinical Neurophysiologist  Neuromuscular Medicine

## 2022-04-18 NOTE — PROGRESS NOTES
Physical Therapy  Facility/Department: [facility] PROGRESSIVE CARE  Daily Treatment Note  NAME: Abdoulaye Hirsch  : 1960  MRN: [id number]    Date of Service: 2022    Discharge Recommendations:  Patient would benefit from continued therapy after discharge   PT Equipment Recommendations  Equipment Needed: No    Assessment   Body structures, Functions, Activity limitations: Decreased functional mobility ; Decreased strength;Decreased endurance;Decreased balance;Decreased posture  Treatment Diagnosis: Impaired functional mobility and endurance 2* fatigue and nausea associated with Diabetic Ketoacidosis  Specific instructions for Next Treatment: Progress endurance and ambulation distance with Rollator (or RW)  History: H/O CVA- was in ARU in Nov,Dec, 2021 and continued OP Therapy; CHF, Type 2 DM, Hemodialysis on /  REQUIRES PT FOLLOW UP: Yes  Activity Tolerance  Activity Tolerance: Patient limited by fatigue;Treatment limited secondary to medical complications (free text) (rapid response called during treatment d/t pt unresponsive)     Patient Diagnosis(es): The primary encounter diagnosis was Diabetic ketoacidosis without coma associated with type 2 diabetes mellitus ([facility] A diagnosis of Dyspnea, unspecified type was also pertinent to this visit. has a past medical history of Backache, unspecified, CHF (congestive heart failure) ([address] Chronic kidney disease, Coronary atherosclerosis of artery bypass graft, COVID, Cramp of limb, Gallstones, Hypertension, Insomnia, Pneumonia, Type II or unspecified type diabetes mellitus with renal manifestations, not stated as uncontrolled(250.40), Type II or unspecified type diabetes mellitus without mention of complication, not stated as uncontrolled, and Unspecified vitamin D deficiency. has a past surgical history that includes Coronary artery bypass graft; Knee arthroscopy; Carpal tunnel release; Breast surgery; Tonsillectomy; Hand surgery;  Ankle fracture surgery; Cholecystectomy, open (N/A); IR TUNNELED CVC PLACE WO SQ PORT/PUMP > 5 YEARS (8/18/2021); AV fistula creation (12/14/2021); Dialysis fistula creation (Left, 12/14/2021); and other surgical history (04/06/2022). Restrictions  Restrictions/Precautions  Restrictions/Precautions: General Precautions,Fall Risk,Up as Tolerated  Required Braces or Orthoses?: No  Subjective   General  Chart Reviewed: Yes  Additional Pertinent Hx: The patient is a 57 y.o. female who presents to [facility] with complaints of shortness of breath. She has a known hx of CHF, DM2 and ESRD on hemodialysis. She states that the SOB has been progressively worsening over the past few days. SOB was not relieved by Dialysis treatment. Response To Previous Treatment: Patient with no complaints from previous session. Family / Caregiver Present: Yes (sister Scooter)  Referring Practitioner: Dr. Freitas Fails: Upin arrival, patient laying slouched down in bed. Agreeable to therapy   General Comment  Comments: DEDE Santizo approved therapy   Pain Screening  Patient Currently in Pain: Denies  Vital Signs  Pulse: 73  Heart Rate Source: Monitor  BP: (!) 120/99  BP Location: Right upper arm  Patient Currently in Pain: Denies  Oxygen Therapy  SpO2: 97 %  Pulse Oximeter Device Mode: Intermittent  Pulse Oximeter Device Location: Finger  O2 Device: None (Room air)  Patient Observation  Observations: Patient seemed lethargic upon entry. When patient sat up on EOB, she had tremers of her upper body. Patient stated she was dizzy upon sitting up. Writer called for RN to come and do an assessment. Patient started to slur her speech while sitting EOB right before patient passed out and became unresponsive. Patient laid back down immediately and writer performed sternal rubs on patient. Patient continued to breathe, however eyes remained closed and she was not able to follow any commands.  RN showed up and called rapid right when patients speech started to slur        Orientation  Orientation  Overall Orientation Status: Impaired  Objective   Bed mobility  Supine to Sit: Maximum assistance  Sit to Supine: Dependent/Total;2 Person assistance  Transfers  Comment: deferreed this date d/t RR called   Ambulation  Ambulation?: No        Other exercises  Other exercises?: Yes  Other exercises 1: bed mobility   Other exercises 2: seated EOB ~8 mintes CGA-Max A. Patient had L lateral lean and posterior lean. Patient stated she was dizzy upon sitting up. Did not complain of SOB     Goals  Short term goals  Time Frame for Short term goals: 5 days  Short term goal 1: pt to demo independent bed mobility   Short term goal 2: pt to perform all Transfers with Rollator (or RW), Mod I  Short term goal 3: pt to ambulate household distances of 54-65' with Rollator (or RW) to improve independence and safety with mobility. Short term goal 4: pt to improve standing balance to good with Use of AD  Short term goal 5: pt to improve standing tolerance to 10 minutes to improve tolerance for daily activities and ADLs  Patient Goals   Patient goals : To return home safely.     Plan    Plan  Times per week: 5-6 tx per week  Specific instructions for Next Treatment: Progress endurance and ambulation distance with Rollator (or RW)  Current Treatment Recommendations: Strengthening,Balance Training,Functional Mobility Training,Transfer Training,Endurance Training,Gait Training  Safety Devices  Type of devices: Call light within reach,Gait belt,Patient at risk for falls,Left in bed,Nurse notified        04/18/22 1500   PT Individual Minutes   Time In 6548   Time Out 8472   Minutes 35   Time Code Minutes   Timed Code Treatment Minutes 25 Minutes     Electronically signed by Cj Douglas PTA on 4/18/22 at 3:43 PM EDT

## 2022-04-18 NOTE — CARE COORDINATION
ONGOING DISCHARGE PLAN:    Patient is alert and oriented x4. Spoke with patient regarding discharge plan and patient confirms that plan is still home with mother and [facility]. Pt goes to HD at [address] [place] [address] on [facility] T/T/S. At 12:00PM.     IV rocephin, diflucan, PO lasix 80 mg  BID. Will continue to follow for additional discharge needs. Electronically signed by Marleny Cueto RN on 4/18/2022 at 12:46 PM     Rapid response/Stroke alert  called on patient today for be unresponsive.    Electronically signed by Marleny Cueto RN on 4/18/2022 at 12:49 PM

## 2022-04-18 NOTE — PROGRESS NOTES
Writer called to room by PT. Pt was sitting on edge of  Bed  With therapy and suddenly got dizzy then went unresponsive. Prior to this episode was talking and moving appropriately. Rapid response was called. Pt was lowered to laying in bed. Initially not talking or following any commands. Rapid team to room, pt noted to be NSR, O2 96%, bp 120//99. Neuro seen pt and cancelled stroke alert, he thinks it was positional changing. Pt slowly coming back around, talking now, very weak movements, delayed slow responses. Confused, oriented to self at this time only (prior was ax4). Minimally responsive but arrousable. Dr Giselle Powell reassessed again. Making slow progress back to baseline. No orders per neuro. Dr Abhilash Wang was updated. New order for neuro consult. Neuro seen patient and does not believe to be stroke and thinks it was position change and bp related. Dr Xiang Bowden updated and new orders as follows:   Normal saline at 50L/hr for 1 bag.   ekg ordered.    Mag check  Phosphorous level   Ionized calcium

## 2022-04-18 NOTE — PROGRESS NOTES
Dr Cierra Scruggs notified pt is still very lethargic and minimally responsive but arouseable and answer questions slowly.  Asked if he still did not think a ct was needed and he stated he will come see her to reassess

## 2022-04-18 NOTE — PROGRESS NOTES
Dr Milton Bey notified pt now reporting double vision, hard to follow with eyes, and right arm tingling.    But is now alert and oriented x4    Will be ordering: another 325 and will order an MRI brain + MRA

## 2022-04-18 NOTE — PROGRESS NOTES
Nephrology Progress Note    Subjective/   57y.o. year old female who we are seeing in consultation for end-stage renal disease on hemodialysis    Interval history:  Patient seen and examined . She has had an episode of Unresponsive while assuming a sitting position on the edge of the bed during physical therapy. She dad prolonged drowsiness after the episode. Patient had no significant drop in blood pressure with systolic blood pressure in the 120s. No chest pain or shortness of breath or dizziness prior to episode. ABG was essentially normal.  Blood sugars were within normal.Patient seen by neurology who do  not consider any additional work-up  Serum potassium is 3.9 with ionized calcium 1.04. Hemoglobin 9.4. Platelet did drop to 94 from 186. Last dialysis was performed on Friday with 2 kg removed. History of Present Illness: This is a 57 y.o. female with hypertension, type 2 diabetes mellitus, end-stage renal disease on hemodialysis   who was recently taken off dialysis about 6 weeks ago due to regaining residual kidney function. Over the course of the last 2 weeks patient  was restarted on hemodialysis due to worsening fluid overload. Initially had problems with her  tunneled catheter which was replaced last week- patient was dialyzed on Saturday, 4/9/2021 and yesterday-4/13/22  at the outpatient dialysis unit. Patient presented with complaints of shortness of breath over the last 2 to 3 days progressively worsening. Patient had associated  Nausea, chest pain but no vomiting. She is on 3 L nasal cannula chronically at home. Patient was found to be in acute DKA with blood sugars greater than 500 was started on the modified DKA protocol. Blood pressures on admission were elevated above 200s and her chest x-ray showed evidence of pulmonary vascular congestion. Labs showed a potassium of 3.3 with BUN/creatinine of 18 and 1.78 mg/dL, bicarbonate of 18 and troponin of 78.   Beta hydroxybutyrate was 7.57. Objective/     Vitals:    04/18/22 1036 04/18/22 1040 04/18/22 1058 04/18/22 1230   BP:  (!) 120/99 (!) 116/55 (!) 105/49   Pulse: 73   66   Resp:    14   Temp:    98.2 °F (36.8 °C)   TempSrc:    Axillary   SpO2: 97%   97%   Weight:       Height:         24HR INTAKE/OUTPUT:      Intake/Output Summary (Last 24 hours) at 4/18/2022 1340  Last data filed at 4/17/2022 1850  Gross per 24 hour   Intake 540 ml   Output --   Net 540 ml     Patient Vitals for the past 96 hrs (Last 3 readings):   Weight   04/18/22 0010 265 lb 14 oz (120.6 kg)   04/17/22 0315 263 lb 7.2 oz (119.5 kg)   04/16/22 1554 239 lb 13.8 oz (108.8 kg)       Constitutional:  Drowsy moves all extremities  Cardiovascular:  S1, S2 without m/r/g  Respiratory:  CTA B without w/r/r  Abdomen: +bs, soft, nt  Ext: 2+ LE edema    Data/  Recent Labs     04/18/22  1249   WBC 6.5   HGB 9.4*   HCT 28.6*   MCV 94.3   PLT 94*     Recent Labs     04/16/22  0907 04/17/22  0625 04/18/22  0618 04/18/22  1227    137 137  --    K 4.0 3.8 3.9  --     100 99  --    CO2 24 24 24  --    GLUCOSE 364* 167* 93  --    PHOS  --   --   --  5.3*   MG  --   --   --  1.9   BUN 18 18 26*  --    CREATININE 2.63* 2.95* 4.32*  --    LABGLOM 18* 16* 10*  --    GFRAA 22* 19* 13*  --          Assessment/   1. End-stage renal disease on hemodialysis by way of a right-sided tunneled catheter, transiently taken off dialysis 6 weeks ago and re- started due to worsening fluid overload 1 week ago. Patient still makes urine proximately 400 to 500 cc daily     2. Hypertensive urgency- improving     3. Acute DKA currently on DKA protocol-resolved     4. Hypokalemia #2 to osmotic diuresis and  total body depletion-improved     5. Hypophosphatemia-resolved     6. CHF with diastolic dysfunction and evidence of fluid overload pulmonary vascular congestion on chest xray     7.  Urinary tract infection with pyuria     8.Period of unresponsiveness-? Syncopal    Plan/ IV calcium gluconate 1 g  IV fluids with 0.9 saline at 50 cc/h for 1 L only  Will assess stability for hemodialysis tomorrow  Hold Flomax  Decrease Lasix to 80 mg daily  Basic metabolic panel daily  Magnesium check   Monitor platelet count due to 50% drop from baseline-      Earl Medel MD    Nephrologist      Earl Medel MD

## 2022-04-18 NOTE — CODE DOCUMENTATION
Cancel stroke alert per Dr. Yan Shah Neurology. No need for a CT head. Patient is more alert and has her eyes open. She is attempting to follow commands. She is still not speaking with staff.

## 2022-04-19 ENCOUNTER — APPOINTMENT (OUTPATIENT)
Dept: GENERAL RADIOLOGY | Age: 62
DRG: 637 | End: 2022-04-19
Payer: COMMERCIAL

## 2022-04-19 PROBLEM — E11.10 DKA, TYPE 2, NOT AT GOAL (HCC): Status: ACTIVE | Noted: 2018-09-20

## 2022-04-19 LAB
ABSOLUTE EOS #: 0.1 K/UL (ref 0–0.4)
ABSOLUTE LYMPH #: 1 K/UL (ref 1–4.8)
ABSOLUTE MONO #: 0.4 K/UL (ref 0.1–1.3)
ANION GAP SERPL CALCULATED.3IONS-SCNC: 15 MMOL/L (ref 9–17)
BASOPHILS # BLD: 0 % (ref 0–2)
BASOPHILS ABSOLUTE: 0 K/UL (ref 0–0.2)
BUN BLDV-MCNC: 35 MG/DL (ref 8–23)
CALCIUM SERPL-MCNC: 8.8 MG/DL (ref 8.6–10.4)
CHLORIDE BLD-SCNC: 98 MMOL/L (ref 98–107)
CO2: 23 MMOL/L (ref 20–31)
CREAT SERPL-MCNC: 4.61 MG/DL (ref 0.5–0.9)
EKG ATRIAL RATE: 67 BPM
EKG P AXIS: 60 DEGREES
EKG P-R INTERVAL: 182 MS
EKG Q-T INTERVAL: 434 MS
EKG QRS DURATION: 94 MS
EKG QTC CALCULATION (BAZETT): 458 MS
EKG R AXIS: 76 DEGREES
EKG T AXIS: 2 DEGREES
EKG VENTRICULAR RATE: 67 BPM
EOSINOPHILS RELATIVE PERCENT: 2 % (ref 0–4)
GFR AFRICAN AMERICAN: 12 ML/MIN
GFR NON-AFRICAN AMERICAN: 10 ML/MIN
GFR SERPL CREATININE-BSD FRML MDRD: ABNORMAL ML/MIN/{1.73_M2}
GLUCOSE BLD-MCNC: 155 MG/DL (ref 65–105)
GLUCOSE BLD-MCNC: 163 MG/DL (ref 65–105)
GLUCOSE BLD-MCNC: 185 MG/DL (ref 65–105)
GLUCOSE BLD-MCNC: 56 MG/DL (ref 65–105)
GLUCOSE BLD-MCNC: 78 MG/DL (ref 70–99)
HCT VFR BLD CALC: 32.3 % (ref 36–46)
HEMOGLOBIN: 10.8 G/DL (ref 12–16)
LYMPHOCYTES # BLD: 16 % (ref 24–44)
MCH RBC QN AUTO: 31.5 PG (ref 26–34)
MCHC RBC AUTO-ENTMCNC: 33.3 G/DL (ref 31–37)
MCV RBC AUTO: 94.4 FL (ref 80–100)
MONOCYTES # BLD: 7 % (ref 1–7)
PDW BLD-RTO: 15.7 % (ref 11.5–14.9)
PLATELET # BLD: 122 K/UL (ref 150–450)
PMV BLD AUTO: 10.1 FL (ref 6–12)
POTASSIUM SERPL-SCNC: 4.3 MMOL/L (ref 3.7–5.3)
RBC # BLD: 3.42 M/UL (ref 4–5.2)
REASON FOR REJECTION: NORMAL
SEG NEUTROPHILS: 75 % (ref 36–66)
SEGMENTED NEUTROPHILS ABSOLUTE COUNT: 4.5 K/UL (ref 1.3–9.1)
SODIUM BLD-SCNC: 136 MMOL/L (ref 135–144)
WBC # BLD: 6 K/UL (ref 3.5–11)
ZZ NTE CLEAN UP: ORDERED TEST: NORMAL
ZZ NTE WITH NAME CLEAN UP: SPECIMEN SOURCE: NORMAL

## 2022-04-19 PROCEDURE — 2060000000 HC ICU INTERMEDIATE R&B

## 2022-04-19 PROCEDURE — 2580000003 HC RX 258: Performed by: INTERNAL MEDICINE

## 2022-04-19 PROCEDURE — 71045 X-RAY EXAM CHEST 1 VIEW: CPT

## 2022-04-19 PROCEDURE — 6370000000 HC RX 637 (ALT 250 FOR IP): Performed by: FAMILY MEDICINE

## 2022-04-19 PROCEDURE — 93010 ELECTROCARDIOGRAM REPORT: CPT | Performed by: INTERNAL MEDICINE

## 2022-04-19 PROCEDURE — 80048 BASIC METABOLIC PNL TOTAL CA: CPT

## 2022-04-19 PROCEDURE — 36415 COLL VENOUS BLD VENIPUNCTURE: CPT

## 2022-04-19 PROCEDURE — 99232 SBSQ HOSP IP/OBS MODERATE 35: CPT | Performed by: PSYCHIATRY & NEUROLOGY

## 2022-04-19 PROCEDURE — 6370000000 HC RX 637 (ALT 250 FOR IP): Performed by: PSYCHIATRY & NEUROLOGY

## 2022-04-19 PROCEDURE — 2500000003 HC RX 250 WO HCPCS: Performed by: INTERNAL MEDICINE

## 2022-04-19 PROCEDURE — 2580000003 HC RX 258: Performed by: STUDENT IN AN ORGANIZED HEALTH CARE EDUCATION/TRAINING PROGRAM

## 2022-04-19 PROCEDURE — 90935 HEMODIALYSIS ONE EVALUATION: CPT

## 2022-04-19 PROCEDURE — 82947 ASSAY GLUCOSE BLOOD QUANT: CPT

## 2022-04-19 PROCEDURE — 6370000000 HC RX 637 (ALT 250 FOR IP): Performed by: INTERNAL MEDICINE

## 2022-04-19 PROCEDURE — 6360000002 HC RX W HCPCS: Performed by: INTERNAL MEDICINE

## 2022-04-19 PROCEDURE — 99232 SBSQ HOSP IP/OBS MODERATE 35: CPT | Performed by: FAMILY MEDICINE

## 2022-04-19 PROCEDURE — 85025 COMPLETE CBC W/AUTO DIFF WBC: CPT

## 2022-04-19 PROCEDURE — 6360000002 HC RX W HCPCS: Performed by: FAMILY MEDICINE

## 2022-04-19 RX ORDER — INSULIN GLARGINE 100 [IU]/ML
60 INJECTION, SOLUTION SUBCUTANEOUS 2 TIMES DAILY
Status: DISCONTINUED | OUTPATIENT
Start: 2022-04-19 | End: 2022-04-21

## 2022-04-19 RX ORDER — FUROSEMIDE 40 MG/1
80 TABLET ORAL DAILY
Status: DISCONTINUED | OUTPATIENT
Start: 2022-04-20 | End: 2022-04-28 | Stop reason: HOSPADM

## 2022-04-19 RX ADMIN — CARVEDILOL 3.12 MG: 3.12 TABLET, FILM COATED ORAL at 21:01

## 2022-04-19 RX ADMIN — ANTI-FUNGAL POWDER MICONAZOLE NITRATE TALC FREE: 1.42 POWDER TOPICAL at 08:58

## 2022-04-19 RX ADMIN — BUSPIRONE HYDROCHLORIDE 7.5 MG: 5 TABLET ORAL at 08:56

## 2022-04-19 RX ADMIN — SODIUM CHLORIDE, PRESERVATIVE FREE 5 ML: 5 INJECTION INTRAVENOUS at 09:11

## 2022-04-19 RX ADMIN — Medication 9 MG: at 22:46

## 2022-04-19 RX ADMIN — RANOLAZINE 1000 MG: 1000 TABLET, FILM COATED, EXTENDED RELEASE ORAL at 21:04

## 2022-04-19 RX ADMIN — Medication 1.6 ML: at 16:01

## 2022-04-19 RX ADMIN — ANTI-FUNGAL POWDER MICONAZOLE NITRATE TALC FREE: 1.42 POWDER TOPICAL at 21:03

## 2022-04-19 RX ADMIN — ASPIRIN 81 MG: 81 TABLET, COATED ORAL at 08:56

## 2022-04-19 RX ADMIN — SODIUM BICARBONATE 650 MG: 650 TABLET ORAL at 21:01

## 2022-04-19 RX ADMIN — FEBUXOSTAT 40 MG: 40 TABLET, FILM COATED ORAL at 08:56

## 2022-04-19 RX ADMIN — CEFTRIAXONE SODIUM 2000 MG: 2 INJECTION, POWDER, FOR SOLUTION INTRAMUSCULAR; INTRAVENOUS at 21:15

## 2022-04-19 RX ADMIN — SODIUM BICARBONATE 650 MG: 650 TABLET ORAL at 08:55

## 2022-04-19 RX ADMIN — INSULIN LISPRO 3 UNITS: 100 INJECTION, SOLUTION INTRAVENOUS; SUBCUTANEOUS at 17:22

## 2022-04-19 RX ADMIN — INSULIN LISPRO 2 UNITS: 100 INJECTION, SOLUTION INTRAVENOUS; SUBCUTANEOUS at 21:07

## 2022-04-19 RX ADMIN — ATORVASTATIN CALCIUM 80 MG: 80 TABLET, FILM COATED ORAL at 08:55

## 2022-04-19 RX ADMIN — GABAPENTIN 300 MG: 300 CAPSULE ORAL at 08:55

## 2022-04-19 RX ADMIN — DOCUSATE SODIUM 100 MG: 100 CAPSULE, LIQUID FILLED ORAL at 21:01

## 2022-04-19 RX ADMIN — DOCUSATE SODIUM 100 MG: 100 CAPSULE, LIQUID FILLED ORAL at 08:55

## 2022-04-19 RX ADMIN — INSULIN LISPRO 3 UNITS: 100 INJECTION, SOLUTION INTRAVENOUS; SUBCUTANEOUS at 12:10

## 2022-04-19 RX ADMIN — HEPARIN SODIUM 5000 UNITS: 5000 INJECTION INTRAVENOUS; SUBCUTANEOUS at 06:40

## 2022-04-19 RX ADMIN — FLUCONAZOLE 200 MG: 200 INJECTION, SOLUTION INTRAVENOUS at 17:06

## 2022-04-19 RX ADMIN — PANTOPRAZOLE SODIUM 40 MG: 40 TABLET, DELAYED RELEASE ORAL at 06:40

## 2022-04-19 RX ADMIN — Medication 1.6 ML: at 16:00

## 2022-04-19 RX ADMIN — INSULIN GLARGINE 60 UNITS: 100 INJECTION, SOLUTION SUBCUTANEOUS at 21:06

## 2022-04-19 RX ADMIN — SODIUM CHLORIDE, PRESERVATIVE FREE 10 ML: 5 INJECTION INTRAVENOUS at 21:00

## 2022-04-19 RX ADMIN — HEPARIN SODIUM 5000 UNITS: 5000 INJECTION INTRAVENOUS; SUBCUTANEOUS at 21:01

## 2022-04-19 RX ADMIN — GABAPENTIN 300 MG: 300 CAPSULE ORAL at 21:01

## 2022-04-19 RX ADMIN — TRAZODONE HYDROCHLORIDE 50 MG: 50 TABLET ORAL at 22:46

## 2022-04-19 RX ADMIN — BUSPIRONE HYDROCHLORIDE 7.5 MG: 5 TABLET ORAL at 21:01

## 2022-04-19 NOTE — PROGRESS NOTES
[facility]   OCCUPATIONAL THERAPY MISSED TREATMENT NOTE   INPATIENT   Date: 22  Patient Name: Darlin Preston       Room: 145/-85  MRN: [id number]   Account #: [de-identified]    : 1960  (60 y.o.)  Gender: female                 REASON FOR MISSED TREATMENT:  Patient at testing and/or off the floor   -   Hemodialysis Occupational therapy will continue to follow.  [facility], OT

## 2022-04-19 NOTE — PROGRESS NOTES
Physical Therapy  DATE: 2022    NAME: Abdoulaye Hirsch  MRN: [id number]   : 1960    Patient not seen this date for Physical Therapy due to:      [] Cancel by RN or physician due to:    [x] Hemodialysis : will follow up next session     [] Critical Lab Value Level     [] Blood transfusion in progress    [] Acute or unstable cardiovascular status   _MAP < 55 or more than >115  _HR < 40 or > 130    [] Acute or unstable pulmonary status   -FiO2 > 60%   _RR < 5 or >40    _O2 sats < 85%    [] Strict Bedrest    [] Off Unit for surgery or procedure    [] Off Unit for testing       [] Pending imaging to R/O fracture    [] Refusal by Patient      [] Other      [] PT being discontinued at this time. Patient independent. No further needs. [] PT being discontinued at this time as the patient has been transferred to hospice care. No further needs.       Tu Centeno, PTA

## 2022-04-19 NOTE — FLOWSHEET NOTE
04/19/22 1522   Encounter Summary   Services provided to: Patient not available  (patient not in her room)

## 2022-04-19 NOTE — PROGRESS NOTES
FAMILY MEDICINE  - PROGRESS NOTE    Date:  4/19/2022  Qasim Mathews  [id number]      Chief Complaint   Patient presents with    Shortness of Breath         Interval History:  Improved, she is feeling a little better and her vitals have improved. No significant events overnight. Blood sugar was 56 this am, I will decrease her insulin dose. Specialists notes, labs & imaging reviewed.       Subjective  Constitutional: positive for obesity  Respiratory: positive for shortness of breath  Cardiovascular: positive for lower extremity edema  Musculoskeletal:positive for arthralgias and myalgias  Behavioral/Psych: positive for anxiety and depression  Endocrine: positive for diabetic symptoms including hyperglycemia:    Objective:    BP (!) 122/40   Pulse 56   Temp 98.2 °F (36.8 °C) (Oral)   Resp 20   Ht 5' 5\" (1.651 m)   Wt 267 lb 6.7 oz (121.3 kg)   SpO2 95%   BMI 44.50 kg/m²   General appearance - alert, well appearing, and in no distress and overweight  Mental status - alert, oriented to person, place, and time  Eyes - pupils equal and reactive, extraocular eye movements intact  Ears - hearing grossly normal bilaterally  Nose - normal and patent, no erythema, discharge or polyps  Mouth - mucous membranes moist, pharynx normal without lesions  Neck - supple, no significant adenopathy  Lymphatics - no palpable lymphadenopathy, no hepatosplenomegaly  Chest - clear to auscultation, no wheezes, rales or rhonchi, symmetric air entry, decreased air entry noted posteriorly  Heart - normal rate, regular rhythm, normal S1, S2, no murmurs, rubs, clicks or gallops  Abdomen - soft, nontender, nondistended, no masses or organomegaly  Breasts - not examined  Back exam - not examined  Neurological - alert, oriented, normal speech, no focal findings or movement disorder noted  Musculoskeletal - no joint tenderness, deformity or swelling  Extremities - pedal edema 1 +  Skin - normal coloration and turgor, no rashes, no suspicious skin lesions noted    Data:   Medications:   Current Facility-Administered Medications   Medication Dose Route Frequency Provider Last Rate Last Admin    polyethylene glycol (GLYCOLAX) packet 17 g  17 g Oral Daily PRN Gus Lesch, MD        0.9 % sodium chloride infusion   IntraVENous Continuous Normtiburcio Valencia MD 50 mL/hr at 04/18/22 1215 New Bag at 04/18/22 1215    aspirin EC tablet 81 mg  81 mg Oral Daily Leroy Fraire MD        insulin glargine (LANTUS) injection vial 70 Units  70 Units SubCUTAneous BID Theo Holbrook MD   70 Units at 04/18/22 2006    insulin lispro (HUMALOG) injection vial 0-18 Units  0-18 Units SubCUTAneous TID WC Theo Holbrook MD   6 Units at 04/17/22 1702    insulin lispro (HUMALOG) injection vial 0-9 Units  0-9 Units SubCUTAneous Nightly Theo Holbrook MD   5 Units at 04/17/22 2026    sodium chloride flush 0.9 % injection 5-40 mL  5-40 mL IntraVENous 2 times per day Aleatha Blizzard, DO   10 mL at 04/18/22 2005    sodium chloride flush 0.9 % injection 5-40 mL  5-40 mL IntraVENous PRN Aleatha Blizzard, DO        0.9 % sodium chloride infusion   IntraVENous PRN Aleatha Blizzard, DO        ondansetron (ZOFRAN-ODT) disintegrating tablet 4 mg  4 mg Oral Q8H PRN Aleatha Blizzard, DO        Or    ondansetron [facility]) injection 4 mg  4 mg IntraVENous Q6H PRN Aleatha Blizzard, DO        ranolazine [facility]) extended release tablet 1,000 mg  1,000 mg Oral BID Genesis Bowens MD   1,000 mg at 04/18/22 2005    busPIRone (BUSPAR) tablet 7.5 mg  7.5 mg Oral TID Genesis Bowens MD   7.5 mg at 04/18/22 2005    pantoprazole (PROTONIX) tablet 40 mg  40 mg Oral QAM AC Genesis Bowens MD   40 mg at 04/19/22 0640    atorvastatin (LIPITOR) tablet 80 mg  80 mg Oral Daily Genesis Bowens MD   80 mg at 04/18/22 0814    febuxostat (ULORIC) tablet 40 mg  40 mg Oral Daily Genesis Bowens MD   40 mg at 04/18/22 0816    docusate sodium (COLACE) capsule 100 mg  100 mg Oral BID Serina Cash MD   100 mg at 04/18/22 2005    [Held by provider] tamsulosin (FLOMAX) capsule 0.4 mg  0.4 mg Oral Daily Serina Cash MD   0.4 mg at 04/18/22 0815    [Held by provider] insulin lispro (HUMALOG) injection vial 15 Units  15 Units SubCUTAneous TID AC Serina Cash MD   15 Units at 04/16/22 1713    sodium bicarbonate tablet 650 mg  650 mg Oral TID Serina Cash MD   650 mg at 04/18/22 2006    furosemide (LASIX) tablet 80 mg  80 mg Oral BID Carissa Arnold MD   80 mg at 04/18/22 1659    traZODone (DESYREL) tablet 50 mg  50 mg Oral Nightly Serina Cash MD   50 mg at 04/18/22 2354    gabapentin (NEURONTIN) capsule 300 mg  300 mg Oral BID Serina Cash MD   300 mg at 04/18/22 2005    melatonin tablet 9 mg  9 mg Oral Nightly Serina Cash MD   9 mg at 04/18/22 2353    lidocaine-prilocaine (EMLA) cream   Topical PRN Serina Cash MD        carvedilol (COREG) tablet 3.125 mg  3.125 mg Oral BID Serina Cash MD   3.125 mg at 04/18/22 2005    heparin (porcine) injection 5,000 Units  5,000 Units SubCUTAneous 3 times per day Serina Cash MD   5,000 Units at 04/19/22 0640    sodium phosphate 10 mmol in dextrose 5 % 250 mL IVPB  10 mmol IntraVENous Once Terri Oh MD        fluconazole (DIFLUCAN) in 0.9 % sodium chloride IVPB 200 mg  200 mg IntraVENous Q24H Angela Simon  mL/hr at 04/18/22 1447 200 mg at 04/18/22 1447    cefTRIAXone (ROCEPHIN) 2000 mg IVPB in D5W 50ml minibag  2,000 mg IntraVENous Q24H Angela Simon MD   Stopped at 04/19/22 0026    anticoagulant sodium citrate 4 % injection 1.6 mL  1.6 mL IntraCATHeter PRN Terri Oh MD   1.6 mL at 04/16/22 1546    anticoagulant sodium citrate 4 % injection 1.6 mL  1.6 mL IntraCATHeter PRN Terri Oh MD   1.6 mL at 04/16/22 1546    glucose (GLUTOSE) 40 % oral gel 15 g  15 g Oral PRN Serina Cash MD  dextrose 50 % IV solution  12.5 g IntraVENous PRN Sarah Devries MD        glucagon (rDNA) injection 1 mg  1 mg IntraMUSCular PRN Sarah Devries MD        miconazole (MICOTIN) 2 % powder   Topical BID Sarah Devries MD   Given at 04/18/22 2011    acetaminophen (TYLENOL) tablet 650 mg  650 mg Oral Q4H PRN Francine Mello, DO   650 mg at 04/16/22 0713       Intake/Output Summary (Last 24 hours) at 4/19/2022 0731  Last data filed at 4/18/2022 2056  Gross per 24 hour   Intake 380 ml   Output --   Net 380 ml     Recent Results (from the past 24 hour(s))   POC Glucose Fingerstick    Collection Time: 04/18/22  7:42 AM   Result Value Ref Range    POC Glucose 97 65 - 105 mg/dL   POC Glucose Fingerstick    Collection Time: 04/18/22 10:36 AM   Result Value Ref Range    POC Glucose 136 (H) 65 - 105 mg/dL   EKG 12 Lead    Collection Time: 04/18/22 10:39 AM   Result Value Ref Range    Ventricular Rate 67 BPM    Atrial Rate 67 BPM    P-R Interval 182 ms    QRS Duration 94 ms    Q-T Interval 434 ms    QTc Calculation (Bazett) 458 ms    P Axis 60 degrees    R Axis 76 degrees    T Axis 2 degrees   Arterial Blood Gases    Collection Time: 04/18/22 11:30 AM   Result Value Ref Range    pH, Arterial 7.422 7.350 - 7.450    pCO2, Arterial 40.7 35.0 - 45.0 mmHg    pO2, Arterial 87.2 80.0 - 100.0 mmHg    HCO3, Arterial 26.5 (H) 22.0 - 26.0 mmol/L    Positive Base Excess, Art 2.1 (H) 0.0 - 2.0 mmol/L    O2 Sat, Arterial 95.0 95 - 98 %    Carboxyhemoglobin 1.2 0 - 5 %    Methemoglobin 1.0 0.0 - 1.9 %    Pt Temp 37.0     O2 Device/Flow/% 2LNC     Respiratory Rate 17     Joao Test PASS     Sample Site Right Radial Artery     Pt.  Position SUPINE     FIO2 28    Magnesium    Collection Time: 04/18/22 12:27 PM   Result Value Ref Range    Magnesium 1.9 1.6 - 2.6 mg/dL   Phosphorus    Collection Time: 04/18/22 12:27 PM   Result Value Ref Range    Phosphorus 5.3 (H) 2.6 - 4.5 mg/dL   Calcium, Ionized    Collection Time: 04/18/22 12:27 PM Result Value Ref Range    Calcium, Ion 1.04 (L) 1.13 - 1.33 mmol/L   CBC    Collection Time: 04/18/22 12:49 PM   Result Value Ref Range    WBC 6.5 3.5 - 11.0 k/uL    RBC 3.04 (L) 4.0 - 5.2 m/uL    Hemoglobin 9.4 (L) 12.0 - 16.0 g/dL    Hematocrit 28.6 (L) 36 - 46 %    MCV 94.3 80 - 100 fL    MCH 31.1 26 - 34 pg    MCHC 32.9 31 - 37 g/dL    RDW 15.5 (H) 11.5 - 14.9 %    Platelets 94 (L) 182 - 450 k/uL    MPV 9.9 6.0 - 12.0 fL   Ammonia    Collection Time: 04/18/22 12:49 PM   Result Value Ref Range    Ammonia 17 11 - 51 umol/L   POC Glucose Fingerstick    Collection Time: 04/18/22  4:26 PM   Result Value Ref Range    POC Glucose 114 (H) 65 - 105 mg/dL   POC Glucose Fingerstick    Collection Time: 04/18/22  8:09 PM   Result Value Ref Range    POC Glucose 110 (H) 65 - 105 mg/dL   SPECIMEN REJECTION    Collection Time: 04/19/22  5:52 AM   Result Value Ref Range    Specimen Source BLOOD     Ordered Test BMP     Reason for Rejection Unable to perform testing: Specimen contaminated.     POC Glucose Fingerstick    Collection Time: 04/19/22  6:54 AM   Result Value Ref Range    POC Glucose 56 (L) 65 - 105 mg/dL     -----------------------------------------------------------------  RAD:  EKG:  Micro:     Assessment & Plan:    Patient Active Problem List:     Atherosclerosis of coronary artery bypass graft of native heart without angina pectoris     Chronic diastolic heart failure (HCC)     Diabetic polyneuropathy associated with type 2 diabetes mellitus ([facility] [address]     History of coronary artery bypass graft     Iron deficiency anemia     Spinal stenosis of lumbar region with neurogenic claudication     Mixed hyperlipidemia     Type 2 diabetes mellitus with kidney complication, with long-term current use of insulin (HCC)     Thyroid nodule greater than or equal to 1 cm in diameter incidentally noted on imaging study     Essential hypertension     Chronic ischemic heart disease     Ischemic stroke of frontal lobe ([facility] [address]     Morbid obesity with BMI of 40.0-44.9, adult (HCC)     Disequilibrium syndrome     Generalized weakness     Long-term memory loss     Muscle right arm weakness     Anxiety     Chronic midline low back pain with bilateral sciatica     Tremor     COVID     End-stage renal disease ([facility] [address]     DKA, type 2, not at goal [facility])     Hypokalemia           Plan:  -DKA - resolved, hypoglycemic this am, regular Lantus dose decreased to 60 units, SS coverage and carb control diet continued.  -Chronic hypoxic respiratory failure - on continuous oxygen per NC, Pulmonology managing. -ESRD on dialysis - Nephrology managing.  -Acute on chronic CHF exacerbation - volume status being managed by Nephrology. -UTI - on Rocephin. -TIA like symptoms - MRI & MRA negative, Neurology following. -RLL infiltrate -repeat CXR shows mild congestion. -D/C planning ongoing.  -Complete orders per chart.     See orders   Disposition:    Electronically signed by Jose Rodriguez MD on 4/19/2022 at 7:31 AM

## 2022-04-19 NOTE — CARE COORDINATION
ONGOING DISCHARGE PLAN:    Patient is alert and oriented x4. Spoke with patient regarding discharge plan and patient confirms that plan is still home with mother with VNS nisa Perez. IV Rocephin and diflucan, PO Lasix 80 mg daily. Checking orthostatic BP and may need midodrine. Will continue to follow for additional discharge needs.     Electronically signed by Felton Collazo RN on 4/19/2022 at 12:32 PM

## 2022-04-19 NOTE — PROGRESS NOTES
Nephrology Progress Note    Subjective/   57y.o. year old female who we are seeing in consultation for end-stage renal disease on hemodialysis    Interval history:  Patient seen and examined at dialysis today. Patient denies dizziness, headache shortness of breath at rest or chest pain. Hemoglobin is stable at 10.8 gm/dl. He did have episode of unresponsive  yesterday while sitting possibly from ? orthostatic syncope. Last dialysis was performed on Friday with 2 kg removed. Patient is now on TTS schedule. Chest x-ray was ordered mild CHF    History of Present Illness: This is a 57 y.o. female with hypertension, type 2 diabetes mellitus, end-stage renal disease on hemodialysis   who was recently taken off dialysis about 6 weeks ago due to regaining residual kidney function. Over the course of the last 2 weeks patient  was restarted on hemodialysis due to worsening fluid overload. Initially had problems with her  tunneled catheter which was replaced last week- patient was dialyzed on Saturday, 4/9/2021 and yesterday-4/13/22  at the outpatient dialysis unit. Patient presented with complaints of shortness of breath over the last 2 to 3 days progressively worsening. Patient had associated  Nausea, chest pain but no vomiting. She is on 3 L nasal cannula chronically at home. Patient was found to be in acute DKA with blood sugars greater than 500 was started on the modified DKA protocol. Blood pressures on admission were elevated above 200s and her chest x-ray showed evidence of pulmonary vascular congestion. Labs showed a potassium of 3.3 with BUN/creatinine of 18 and 1.78 mg/dL, bicarbonate of 18 and troponin of 78. Beta hydroxybutyrate was 7.57.   Objective/     Vitals:    04/18/22 2000 04/19/22 0029 04/19/22 0615 04/19/22 0722   BP: (!) 127/41 (!) 104/45  (!) 122/40   Pulse: 65 60  56   Resp: 16 18  20   Temp: 98.2 °F (36.8 °C) 97.9 °F (36.6 °C)  98.2 °F (36.8 °C)   TempSrc: Oral Axillary Oral   SpO2: 100% 92%  95%   Weight:   267 lb 6.7 oz (121.3 kg)    Height:         24HR INTAKE/OUTPUT:      Intake/Output Summary (Last 24 hours) at 4/19/2022 1311  Last data filed at 4/19/2022 6297  Gross per 24 hour   Intake 740 ml   Output --   Net 740 ml     Patient Vitals for the past 96 hrs (Last 3 readings):   Weight   04/19/22 0615 267 lb 6.7 oz (121.3 kg)   04/18/22 0010 265 lb 14 oz (120.6 kg)   04/17/22 0315 263 lb 7.2 oz (119.5 kg)       Constitutional: Alert and oriented x3  Cardiovascular:  S1, S2 without m/r/g  Respiratory: Diminished air entry  Abdomen: +bs, soft, nt  Ext: 2+ LE edema    Data/  Recent Labs     04/18/22  1249 04/19/22  0918   WBC 6.5 6.0   HGB 9.4* 10.8*   HCT 28.6* 32.3*   MCV 94.3 94.4   PLT 94* 122*     Recent Labs     04/17/22  0625 04/18/22  0618 04/18/22  1227 04/19/22  0711    137  --  136   K 3.8 3.9  --  4.3    99  --  98   CO2 24 24  --  23   GLUCOSE 167* 93  --  78   PHOS  --   --  5.3*  --    MG  --   --  1.9  --    BUN 18 26*  --  35*   CREATININE 2.95* 4.32*  --  4.61*   LABGLOM 16* 10*  --  10*   GFRAA 19* 13*  --  12*         Assessment/   1. End-stage renal disease on hemodialysis by way of a right-sided tunneled catheter, transiently taken off dialysis 6 weeks ago and re- started due to worsening fluid overload 1 week ago. Patient still makes urine proximately 400 to 500 cc daily     2. Hypertensive urgency- improving     3. Acute DKA currently on DKA protocol-resolved     4. Hypokalemia #2 to osmotic diuresis and  total body depletion-improved     5. CHF with diastolic dysfunction and evidence of fluid overload pulmonary vascular congestion on chest xray     6.  Urinary tract infection with pyuria     7.   Thrombocytopenia-improving    Plan/   Discontinue IV fluids   We will not aggressively ultrafiltrate with dialysis today to avoid syncope or hypotension- will give midodrine and albumin with dialysis  Hold Flomax  Decrease Lasix to 80 mg daily  Basic metabolic panel daily    Andria Martino M.D, [facility]  Nephrologist

## 2022-04-19 NOTE — PROGRESS NOTES
HEMODIALYSIS PRE-TREATMENT NOTE    Patient Identifiers prior to treatment: Name//MRN    Isolation Required:  Yes                      Isolation Type: Contact       (please document if patient is being managed as a PUI/COVID-19 patient)        Hepatitis status:                           Date Drawn                             Result  Hepatitis B Surface Antigen 2021     NEG                     Hepatitis B Surface Antibody 2021 POS     60   Hepatitis B Core Antibody N/A N/A          How was Hepatitis Status verified: Epic chart     Was a copy of the labs you documented provided to facility for the patient's chart: yes    Hemodialysis orders verified: yes    Access Within normal limits ( I.e. s/s of infection,...): yes     Pre-Assessment completed: yes by Alia Monsivais RN    Pre-dialysis report received from: [facility]                      Time: 12:40

## 2022-04-19 NOTE — PROGRESS NOTES
HEMODIALYSIS POST TREATMENT NOTE    Treatment time ordered: 3Hrs    Actual treatment time: 3Hrs    UltraFiltration Goal: 1-2Kgs  UltraFiltration Removed: 1.5Kgs2      Pre Treatment weight: 121.3Kgs  Post Treatment weight: 120Kgs  Estimated Dry Weight: Unknown at this time    Access used:     Central Venous Catheter:          Tunneled or Non-tunneled: Tunneled           Site: R Chest          Access Flow: Good      Internal Access:       AV Fistula or AV Graft: AVF, not yet in use         Site: L Lower arm       Access Flow: N/A       Sign and symptoms of infection: No       If YES: N/A    Medications or blood products given: N/A    Chronic outpatient schedule: TTS    Chronic outpatient unit: [facility]    Summary of response to treatment: Tolertaed fairly well with no issues throughout trx. Tolerated 1.5kgs fluid removal. Patient system very dark and clotted at end of trx. Explain if orders NOT met, was physician notified:N/A      ACES flowsheet faxed to patient unit/ placed in patient chart: yes    Post assessment completed: yes by Sheliah Cranker, RN    Report given to: Alex Wolfe documented Safety Checks include the followin) Access and face visible at all times. 2) All connections and blood lines are secure with no kinks. 3) NVL alarm engaged. 4) Hemosafe device applied (if applicable). 5) No collapse of Arterial or Venous blood chambers. 6) All blood lines / pump segments in the air detectors.

## 2022-04-19 NOTE — PLAN OF CARE
Problem: Falls - Risk of:  Goal: Will remain free from falls  Description: Will remain free from falls  4/19/2022 0521 by Pat Garcia RN  Outcome: Ongoing     Problem: Falls - Risk of:  Goal: Absence of physical injury  Description: Absence of physical injury  4/19/2022 0521 by Pat Garcia RN  Outcome: Ongoing     Problem: Skin Integrity:  Goal: Will show no infection signs and symptoms  Description: Will show no infection signs and symptoms  4/19/2022 0521 by Pat Garcia RN  Outcome: Ongoing     Problem: Skin Integrity:  Goal: Absence of new skin breakdown  Description: Absence of new skin breakdown  4/19/2022 0521 by Pat Garcia RN  Outcome: Ongoing     Problem: Musculor/Skeletal Functional Status  Goal: Highest potential functional level  4/19/2022 0521 by Pat Garcia RN  Outcome: Ongoing     Problem: Musculor/Skeletal Functional Status  Goal: Absence of falls  4/19/2022 0521 by Pat Garcia RN  Outcome: Ongoing     Problem: Pain:  Goal: Pain level will decrease  Description: Pain level will decrease  4/19/2022 0521 by Pat Garcia RN  Outcome: Ongoing     Problem: Pain:  Goal: Control of acute pain  Description: Control of acute pain  4/19/2022 0521 by Pat Garcia RN  Outcome: Ongoing     Problem: Pain:  Goal: Control of chronic pain  Description: Control of chronic pain  4/19/2022 0521 by Pat Garcia RN  Outcome: Ongoing

## 2022-04-19 NOTE — PROGRESS NOTES
HPI/Hospital course: This is a 58 y/o WF with significant medical problems to include CHF, ESRD, CAD and lymphedema and she is admitted mostly for management of her fluid status and renal failure. I came to urgently evaluate her when I overheard and overhead inpatient stroke alert. Reportedly she was sitting at the side of the bed and then developed a blank stare and became unresponsive. She was placed back in bed, semi-recumbent and continued to have minimal responsiveness. There was no reported gaze deviation, unilateral weakness or seizure activity. Her SBP in this semi-recumbent position was about 120. I lowered the head of the bed to flat and she did become a bit more responsive and opened her eyes and could follow some minimal commands. About 1 hour later I did re-evaluate her and found her still very sleepy appearing but she would make eye contact and follow some commands. Upper extremities appear to have equal antigravity strength but there is some asterixis noted. Following this she did return to full mental status but noted some double vision and right arm numbness. This has all resolved and she feels better today.      MRI/MRA completed last night and was negative for any vascular stenoses or strokes            Past Medical History:   Diagnosis Date    Backache, unspecified     CHF (congestive heart failure) (HCC)     Chronic kidney disease     Coronary atherosclerosis of artery bypass graft     COVID 1/31/2022    Cramp of limb     Gallstones     Hypertension     Insomnia     Pneumonia     Type II or unspecified type diabetes mellitus with renal manifestations, not stated as uncontrolled(250.40)     Type II or unspecified type diabetes mellitus without mention of complication, not stated as uncontrolled     Unspecified vitamin D deficiency        Past Surgical History:   Procedure Laterality Date    ANKLE FRACTURE SURGERY      AV FISTULA CREATION  12/14/2021    BREAST SURGERY      CARPAL TUNNEL RELEASE      CHOLECYSTECTOMY, OPEN N/A     CORONARY ARTERY BYPASS GRAFT      x3    DIALYSIS FISTULA CREATION Left 12/14/2021    LEFT AV FISTULA CREATION UPPER EXTREMITY performed by Checo Mcpherson MD at [address] [facility] IR TUNNELED CATHETER PLACEMENT GREATER THAN 5 YEARS  8/18/2021    IR TUNNELED CATHETER PLACEMENT GREATER THAN 5 YEARS 8/18/2021 [facility] SPECIAL PROCEDURES    KNEE ARTHROSCOPY      right    OTHER SURGICAL HISTORY  04/06/2022    cvc exchange    TONSILLECTOMY         Family History   Problem Relation Age of Onset    Diabetes Father     Heart Failure Father        Social History     Socioeconomic History    Marital status: Single     Spouse name: None    Number of children: None    Years of education: None    Highest education level: None   Occupational History    None   Tobacco Use    Smoking status: Never Smoker    Smokeless tobacco: Never Used   Vaping Use    Vaping Use: Never used   Substance and Sexual Activity    Alcohol use: No    Drug use: No    Sexual activity: Not Currently   Other Topics Concern    None   Social History Narrative    None     Social Determinants of Health     Financial Resource Strain: Low Risk     Difficulty of Paying Living Expenses: Not very hard   Food Insecurity: No Food Insecurity    Worried About Running Out of Food in the Last Year: Never true    Nany of Food in the Last Year: Never true   Transportation Needs: No Transportation Needs    Lack of Transportation (Medical): No    Lack of Transportation (Non-Medical): No   Physical Activity: Inactive    Days of Exercise per Week: 0 days    Minutes of Exercise per Session: 0 min   Stress: Stress Concern Present    Feeling of Stress :  To some extent   Social Connections: Socially Isolated    Frequency of Communication with Friends and Family: More than three times a week    Frequency of Social Gatherings with Friends and Family: More than three times a week    Attends Spiritism Services: Never    Active Member of Clubs or Organizations: No    Attends Club or Organization Meetings: Never    Marital Status: Never    Intimate Partner Violence:     Fear of Current or Ex-Partner: Not on file    Emotionally Abused: Not on file    Physically Abused: Not on file    Sexually Abused: Not on file   Housing Stability: [address] Unable to Pay for Housing in the Last Year: No    Number of [place] in the Last Year: 1    Unstable Housing in the Last Year: No       Current Facility-Administered Medications   Medication Dose Route Frequency Provider Last Rate Last Admin    [START ON 4/20/2022] furosemide (LASIX) tablet 80 mg  80 mg Oral Daily Evangelist Haywood MD        insulin glargine (LANTUS) injection vial 60 Units  60 Units SubCUTAneous BID Camila Mack MD        polyethylene glycol (GLYCOLAX) packet 17 g  17 g Oral Daily PRN Fredy Garza MD        aspirin EC tablet 81 mg  81 mg Oral Daily Anette Canavan, MD   81 mg at 04/19/22 0856    insulin lispro (HUMALOG) injection vial 0-18 Units  0-18 Units SubCUTAneous TID WC Toby Oppenheim, MD   6 Units at 04/17/22 1702    insulin lispro (HUMALOG) injection vial 0-9 Units  0-9 Units SubCUTAneous Nightly Toby Oppenheim, MD   5 Units at 04/17/22 2026    sodium chloride flush 0.9 % injection 5-40 mL  5-40 mL IntraVENous 2 times per day Ale Gene, DO   5 mL at 04/19/22 0911    sodium chloride flush 0.9 % injection 5-40 mL  5-40 mL IntraVENous PRN Ale Gene, DO        0.9 % sodium chloride infusion   IntraVENous PRN Ale Gene, DO        ondansetron (ZOFRAN-ODT) disintegrating tablet 4 mg  4 mg Oral Q8H PRN Ale Gene, DO        Or    ondansetron [facility]) injection 4 mg  4 mg IntraVENous Q6H PRN Ale Gene, DO        ranolazine [facility]) extended release tablet 1,000 mg  1,000 mg Oral BID Camila Mack MD   1,000 mg at 04/18/22 2005    busPIRone (BUSPAR) tablet 7.5 mg  7.5 mg Oral TID Anju Burns MD   7.5 mg at 04/19/22 0856    pantoprazole (PROTONIX) tablet 40 mg  40 mg Oral QAM AC Anju Burns MD   40 mg at 04/19/22 0640    atorvastatin (LIPITOR) tablet 80 mg  80 mg Oral Daily Anju Burns MD   80 mg at 04/19/22 0855    febuxostat (ULORIC) tablet 40 mg  40 mg Oral Daily Anju Burns MD   40 mg at 04/19/22 0856    docusate sodium (COLACE) capsule 100 mg  100 mg Oral BID Anju Burns MD   100 mg at 04/19/22 0855    [Held by provider] tamsulosin (FLOMAX) capsule 0.4 mg  0.4 mg Oral Daily Anju Burns MD   0.4 mg at 04/18/22 0815    [Held by provider] insulin lispro (HUMALOG) injection vial 15 Units  15 Units SubCUTAneous TID  Anju Burns MD   15 Units at 04/16/22 1713    sodium bicarbonate tablet 650 mg  650 mg Oral TID Anju Burns MD   650 mg at 04/19/22 0855    traZODone (DESYREL) tablet 50 mg  50 mg Oral Nightly Anju Burns MD   50 mg at 04/18/22 2354    gabapentin (NEURONTIN) capsule 300 mg  300 mg Oral BID Anju Burns MD   300 mg at 04/19/22 0855    melatonin tablet 9 mg  9 mg Oral Nightly Anju Burns MD   9 mg at 04/18/22 2353    lidocaine-prilocaine (EMLA) cream   Topical PRN Anju Burns MD        carvedilol (COREG) tablet 3.125 mg  3.125 mg Oral BID Anju Burns MD   3.125 mg at 04/18/22 2005    heparin (porcine) injection 5,000 Units  5,000 Units SubCUTAneous 3 times per day Anju Burns MD   5,000 Units at 04/19/22 0640    sodium phosphate 10 mmol in dextrose 5 % 250 mL IVPB  10 mmol IntraVENous Once Dion Cruz MD        fluconazole (DIFLUCAN) in 0.9 % sodium chloride IVPB 200 mg  200 mg IntraVENous Q24H Mark Anthony Ellis  mL/hr at 04/18/22 1447 200 mg at 04/18/22 1447    cefTRIAXone (ROCEPHIN) 2000 mg IVPB in D5W 50ml minibag  2,000 mg IntraVENous Q24H Mark Anthony Ellis MD   Stopped at 04/19/22 0026    anticoagulant sodium citrate 4 % injection 1.6 mL  1.6 mL IntraCATHeter PRN Veronica Howard MD   1.6 mL at 04/16/22 1546    anticoagulant sodium citrate 4 % injection 1.6 mL  1.6 mL IntraCATHeter PRN Dietrich Gowers Da Rocha-Afodu, MD   1.6 mL at 04/16/22 1546    glucose (GLUTOSE) 40 % oral gel 15 g  15 g Oral PRN Diana Sánchez MD        dextrose 50 % IV solution  12.5 g IntraVENous PRN Diana Sánchez MD        glucagon (rDNA) injection 1 mg  1 mg IntraMUSCular PRN Diana Sánchez MD        miconazole (MICOTIN) 2 % powder   Topical BID Diana Sánchez MD   Given at 04/19/22 1094    acetaminophen (TYLENOL) tablet 650 mg  650 mg Oral Q4H PRN Dina Adjutant, DO   650 mg at 04/16/22 4105       Allergies   Allergen Reactions    Adhesive Tape Other (See Comments) and Rash     Other reaction(s): Unknown    Ace Inhibitors Other (See Comments)     cough    Iv Dye [Iodides]      Stage 4 kidney disease    Nsaids      CANNOT TAKE D/T KIDNEY DISEASE    Metformin And Related Hives, Itching and Rash         Vitals:    04/19/22 0722   BP: (!) 122/40   Pulse: 56   Resp: 20   Temp: 98.2 °F (36.8 °C)   SpO2: 95%     Admission weight: 248 lb (112.5 kg)    General Exam:  overweight body habitus, no acute distress    HEENT:  Normocephalic, atraumatic  Eyes: conjunctiva non-injected, sclera anicteric  Mucous membranes of normal color and hydration status      Neurologic exam:     Mental status: alert and oriented to person, place, time and situation  No overt visuospatial neglect or gaze preference  Language with normal fluency and comprehension to simple commands. Cranial nerves:  Pupils equal round and reactive to light, no eyelid ptosis  Extraocular movements: intact and full. Face is symmetric to movement and sensation  Hearing is intact to conversation  Tongue midline, no dysarthria or dysphonia    Motor exam:  Strength   (Right/Left)  >4/5 with some asterixis     MRI brain and MRA reviewed  Labs reviewed         Assessment :   This is a 58 y/o WF with multiple medical issues who is admitted mostly for management of CHF, lymphedema and renal failure. She became unresponsive while sitting up. Most likely this was (in part) an orthostatically triggered event. Her blood pressures were a bit on the soft side when recumbent and were certainly lower when she was sitting up. She was also on flomax in the mornings and this has since been discontinued. She did however remain quite somnolent following this initial event which is not expected after returning to recumbency. ABG and glucose were OK and CBC only reveals some mildly worsening anemia. MRI brain and MRA negative for strokes or vascular stenoses. Plan:  1, Orthostatics today, if positive may need to resume midodrine        Jorge Joe. Herman De Jesus M.D.   Clinical Neurophysiologist  Neuromuscular Medicine

## 2022-04-19 NOTE — PROGRESS NOTES
Jason Bhatt MD/Carter Vasquez MD/ Domonique Hillman MD/Jordan Duran AGACNP-BC, NP-C      Clarita SHELBY NP-C     Naseem SHELBY NP-C                                           Pulmonary Progress Note    Patient - Loly Matos   Age - 57 y.o.   - 1960  MRN - [id number]  Acct # - [de-identified]  Date of Admission - 2022  3:17 PM    Consulting Service/Physician:       Primary Care Physician: AFSANEH Gifford - FRANKY    SUBJECTIVE:     Chief Complaint:   Chief Complaint   Patient presents with    Shortness of Breath     Subjective:    She had a fairly symptomatic event yesterday where she became unresponsive after sitting up with physical therapy, this was witnessed, it took her quite a while to come to, with some double vision afterwards. Neurology have been consulted, MRI/MRA was negative for any acute events. Her blood pressure has remained stable, but has not had any orthostatic blood pressures. She was staying flat most of the day yesterday due to the event. She denies any shortness of breath, no orthopnea. She does not have a significant cough or fevers. Her vital signs have remained stable. Today is her normal hemodialysis today, nephrology did give her some IV fluids as well as calcium gluconate yesterday. No other acute issues or events overnight.     VITALS  BP (!) 122/40   Pulse 56   Temp 98.2 °F (36.8 °C) (Oral)   Resp 20   Ht 5' 5\" (1.651 m)   Wt 267 lb 6.7 oz (121.3 kg)   SpO2 95%   BMI 44.50 kg/m²   Wt Readings from Last 3 Encounters:   22 267 lb 6.7 oz (121.3 kg)   22 248 lb (112.5 kg)   22 250 lb (113.4 kg)     I/O (24 Hours)    Intake/Output Summary (Last 24 hours) at 2022 0943  Last data filed at 2022  Gross per 24 hour   Intake 380 ml   Output --   Net 380 ml     Ventilator:      Exam:   Physical Exam   Constitutional:  Oriented to person, place, and time. Appears well-developed and well-nourished. Lying flat in bed no distress  HENT: Unremarkable, nasal cannula in place  Head: Normocephalic and atraumatic. Eyes: EOM are normal. Pupils are equal, round, and reactive to light. Neck: Neck supple. Cardiovascular:  Regular rate and rhythm. Normal heart tones. No JVD. Pulmonary/Chest: Effort normal and breath sounds diminished due to body habitus, not in distress, on 2 L  Abdominal: Obese Soft. Bowel sounds are normal.   Musculoskeletal: Normal range of motion. Neurological:patient is alert and oriented to person, place, and time. Skin: Skin is warm and dry.    Extremities: Bilateral lower extremity trace edema, improved  Infusions:      sodium chloride       Meds:     Current Facility-Administered Medications:     [START ON 4/20/2022] furosemide (LASIX) tablet 80 mg, 80 mg, Oral, Daily, Dell Hunter MD    insulin glargine (LANTUS) injection vial 60 Units, 60 Units, SubCUTAneous, BID, Thao Louie MD    polyethylene glycol (GLYCOLAX) packet 17 g, 17 g, Oral, Daily PRN, Tonie Trammell MD    aspirin EC tablet 81 mg, 81 mg, Oral, Daily, Jacinto Storey MD, 81 mg at 04/19/22 0856    insulin lispro (HUMALOG) injection vial 0-18 Units, 0-18 Units, SubCUTAneous, TID WC, Christopher Stroud MD, 6 Units at 04/17/22 1702    insulin lispro (HUMALOG) injection vial 0-9 Units, 0-9 Units, SubCUTAneous, Nightly, Christopher Stroud MD, 5 Units at 04/17/22 2026    sodium chloride flush 0.9 % injection 5-40 mL, 5-40 mL, IntraVENous, 2 times per day, Magda Rubinet, DO, 5 mL at 04/19/22 0911    sodium chloride flush 0.9 % injection 5-40 mL, 5-40 mL, IntraVENous, PRN, Magda Emigdio, DO    0.9 % sodium chloride infusion, , IntraVENous, PRN, Magda Emigdio, DO    ondansetron (ZOFRAN-ODT) disintegrating tablet 4 mg, 4 mg, Oral, Q8H PRN **OR** ondansetron (ZOFRAN) injection 4 mg, 4 mg, IntraVENous, Q6H PRN, Magda Beal DO    ranolazine (RANEXA) extended release tablet 1,000 mg, 1,000 mg, Oral, BID, Camila Mack MD, 1,000 mg at 04/18/22 2005    busPIRone (BUSPAR) tablet 7.5 mg, 7.5 mg, Oral, TID, Camila Mack MD, 7.5 mg at 04/19/22 0856    pantoprazole (PROTONIX) tablet 40 mg, 40 mg, Oral, QAM AC, Camila Mack MD, 40 mg at 04/19/22 0640    atorvastatin (LIPITOR) tablet 80 mg, 80 mg, Oral, Daily, Camila Mack MD, 80 mg at 04/19/22 0855    febuxostat (ULORIC) tablet 40 mg, 40 mg, Oral, Daily, Camila Mack MD, 40 mg at 04/19/22 0856    docusate sodium (COLACE) capsule 100 mg, 100 mg, Oral, BID, Camila Mack MD, 100 mg at 04/19/22 0855    [Held by provider] tamsulosin (FLOMAX) capsule 0.4 mg, 0.4 mg, Oral, Daily, Camila Mack MD, 0.4 mg at 04/18/22 0815    [Held by provider] insulin lispro (HUMALOG) injection vial 15 Units, 15 Units, SubCUTAneous, TID AC, Camila Mack MD, 15 Units at 04/16/22 1713    sodium bicarbonate tablet 650 mg, 650 mg, Oral, TID, Camila Mack MD, 650 mg at 04/19/22 0855    traZODone (DESYREL) tablet 50 mg, 50 mg, Oral, Nightly, Camila Mack MD, 50 mg at 04/18/22 2354    gabapentin (NEURONTIN) capsule 300 mg, 300 mg, Oral, BID, Camila Mack MD, 300 mg at 04/19/22 0855    melatonin tablet 9 mg, 9 mg, Oral, Nightly, Camila Mack MD, 9 mg at 04/18/22 2353    lidocaine-prilocaine (EMLA) cream, , Topical, PRN, Camila Mack MD    carvedilol (COREG) tablet 3.125 mg, 3.125 mg, Oral, BID, Camila Mack MD, 3.125 mg at 04/18/22 2005    heparin (porcine) injection 5,000 Units, 5,000 Units, SubCUTAneous, 3 times per day, Camila Mack MD, 5,000 Units at 04/19/22 0640    sodium phosphate 10 mmol in dextrose 5 % 250 mL IVPB, 10 mmol, IntraVENous, Once, Shaye Davis MD    fluconazole (DIFLUCAN) in 0.9 % sodium chloride IVPB 200 mg, 200 mg, IntraVENous, Q24H, Toby Oppenheim, MD, Last Rate: 100 mL/hr at 04/18/22 1447, 200 mg at 04/18/22 1447    cefTRIAXone (ROCEPHIN) 2000 mg IVPB in D5W 50ml minibag, 2,000 mg, IntraVENous, Q24H, Yonatan Farmer MD, Stopped at 04/19/22 0026    anticoagulant sodium citrate 4 % injection 1.6 mL, 1.6 mL, IntraCATHeter, PRN, Sreedhar Arnold MD, 1.6 mL at 04/16/22 1546    anticoagulant sodium citrate 4 % injection 1.6 mL, 1.6 mL, IntraCATHeter, PRN, Payton Sawyer MD, 1.6 mL at 04/16/22 1546    glucose (GLUTOSE) 40 % oral gel 15 g, 15 g, Oral, PRN, Sarah Devries MD    dextrose 50 % IV solution, 12.5 g, IntraVENous, PRN, Sarah Devries MD    glucagon (rDNA) injection 1 mg, 1 mg, IntraMUSCular, PRN, Sarah Devries MD    miconazole (MICOTIN) 2 % powder, , Topical, BID, Sarah Devries MD, Given at 04/19/22 0858    acetaminophen (TYLENOL) tablet 650 mg, 650 mg, Oral, Q4H PRN, Francien Mello DO, 650 mg at 04/16/22 0713    Lab Results:     Lab Results   Component Value Date    WBC 6.0 04/19/2022    HGB 10.8 (L) 04/19/2022    HCT 32.3 (L) 04/19/2022    MCV 94.4 04/19/2022     (L) 04/19/2022     Lab Results   Component Value Date    CALCIUM 8.8 04/19/2022     04/19/2022    K 4.3 04/19/2022    CO2 23 04/19/2022    CL 98 04/19/2022    BUN 35 (H) 04/19/2022    CREATININE 4.61 (H) 04/19/2022       Lab Results   Component Value Date    INR 0.9 11/10/2021    PROTIME 12.7 11/10/2021     2D echo 4/13/22  Left ventricle is normal in size. Moderate left ventricular hypertrophy. Global left ventricular systolic function is normal. Estimated LV EF >55 %. No obvious wall motion abnormality seen. Evidence of mild (grade I)  diastolic dysfunction. Left atrium is normal in size. Mild tricuspid regurgitation. No pulmonary hypertension. Estimated right ventricular systolic pressure is  10XBPM.   Radiology:       4/19/22                                                                                  4/15/22  Chest x-ray continues show some improvement, with vascular congestion still some congestion noted to the left lower lobe  ASSESSMENT:       1. RLL pneumonia/atelectasis versus fluid volume excess  2. DKA - resolved  3. HTN urgency  4. UTI- on Ceftriaxone, culture with no signficant growth  5. ESRD on hemodialysis  6. Acute on Chronic diastolic heart failure-Grade 1  7. Syncopal episode yesterday thought to be related to orthostatic hypotension  8. Chronic hypoxic respiratory insufficiency-on 2 L  9. ERICK (clinical diagnosis)  10. Obesity  PLAN:   1. UTI per primary  2. On home dose of oxygen  3. Await orthostatic blood pressure readings today  4. Likely needs sleep study as an outpatient  5. Hemodialysis scheduled today, she did receive IV fluids overnight due to her significant orthostatic event  6. Monitor for any signs of fluid overload  7. D/w RN      Electronically signed by AFSANEH Mckinney CNP on 04/19/22     This progress note was completed using a voice transcription system. Every effort was made to ensure accuracy. However, inadvertent computerized transcription errors may be present.     SACHA SHELBY AGACNP-BC, NP-C  [facility] Pulmonary, Critical Care & Sleep

## 2022-04-20 LAB
ABSOLUTE EOS #: 0.3 K/UL (ref 0–0.4)
ABSOLUTE LYMPH #: 1.2 K/UL (ref 1–4.8)
ABSOLUTE MONO #: 0.5 K/UL (ref 0.1–1.3)
ANION GAP SERPL CALCULATED.3IONS-SCNC: 12 MMOL/L (ref 9–17)
BASOPHILS # BLD: 1 % (ref 0–2)
BASOPHILS ABSOLUTE: 0 K/UL (ref 0–0.2)
BUN BLDV-MCNC: 25 MG/DL (ref 8–23)
CALCIUM SERPL-MCNC: 8.4 MG/DL (ref 8.6–10.4)
CHLORIDE BLD-SCNC: 100 MMOL/L (ref 98–107)
CO2: 24 MMOL/L (ref 20–31)
CREAT SERPL-MCNC: 3.39 MG/DL (ref 0.5–0.9)
EOSINOPHILS RELATIVE PERCENT: 5 % (ref 0–4)
GFR AFRICAN AMERICAN: 17 ML/MIN
GFR NON-AFRICAN AMERICAN: 14 ML/MIN
GFR SERPL CREATININE-BSD FRML MDRD: ABNORMAL ML/MIN/{1.73_M2}
GLUCOSE BLD-MCNC: 113 MG/DL (ref 65–105)
GLUCOSE BLD-MCNC: 125 MG/DL (ref 70–99)
GLUCOSE BLD-MCNC: 205 MG/DL (ref 65–105)
GLUCOSE BLD-MCNC: 210 MG/DL (ref 65–105)
GLUCOSE BLD-MCNC: 296 MG/DL (ref 65–105)
HCT VFR BLD CALC: 31.6 % (ref 36–46)
HEMOGLOBIN: 10.5 G/DL (ref 12–16)
LYMPHOCYTES # BLD: 22 % (ref 24–44)
MCH RBC QN AUTO: 31.2 PG (ref 26–34)
MCHC RBC AUTO-ENTMCNC: 33.2 G/DL (ref 31–37)
MCV RBC AUTO: 94.2 FL (ref 80–100)
MONOCYTES # BLD: 9 % (ref 1–7)
PDW BLD-RTO: 15.7 % (ref 11.5–14.9)
PLATELET # BLD: 122 K/UL (ref 150–450)
PMV BLD AUTO: 10.9 FL (ref 6–12)
POTASSIUM SERPL-SCNC: 4.3 MMOL/L (ref 3.7–5.3)
RBC # BLD: 3.36 M/UL (ref 4–5.2)
REASON FOR REJECTION: NORMAL
SEG NEUTROPHILS: 63 % (ref 36–66)
SEGMENTED NEUTROPHILS ABSOLUTE COUNT: 3.4 K/UL (ref 1.3–9.1)
SODIUM BLD-SCNC: 136 MMOL/L (ref 135–144)
WBC # BLD: 5.4 K/UL (ref 3.5–11)
ZZ NTE CLEAN UP: ORDERED TEST: NORMAL
ZZ NTE WITH NAME CLEAN UP: SPECIMEN SOURCE: NORMAL

## 2022-04-20 PROCEDURE — 6360000002 HC RX W HCPCS: Performed by: FAMILY MEDICINE

## 2022-04-20 PROCEDURE — 6360000002 HC RX W HCPCS: Performed by: INTERNAL MEDICINE

## 2022-04-20 PROCEDURE — 97166 OT EVAL MOD COMPLEX 45 MIN: CPT

## 2022-04-20 PROCEDURE — 85025 COMPLETE CBC W/AUTO DIFF WBC: CPT

## 2022-04-20 PROCEDURE — 6370000000 HC RX 637 (ALT 250 FOR IP): Performed by: FAMILY MEDICINE

## 2022-04-20 PROCEDURE — 6370000000 HC RX 637 (ALT 250 FOR IP): Performed by: STUDENT IN AN ORGANIZED HEALTH CARE EDUCATION/TRAINING PROGRAM

## 2022-04-20 PROCEDURE — 2580000003 HC RX 258: Performed by: STUDENT IN AN ORGANIZED HEALTH CARE EDUCATION/TRAINING PROGRAM

## 2022-04-20 PROCEDURE — 99254 IP/OBS CNSLTJ NEW/EST MOD 60: CPT | Performed by: PHYSICAL MEDICINE & REHABILITATION

## 2022-04-20 PROCEDURE — 82947 ASSAY GLUCOSE BLOOD QUANT: CPT

## 2022-04-20 PROCEDURE — 6370000000 HC RX 637 (ALT 250 FOR IP): Performed by: PSYCHIATRY & NEUROLOGY

## 2022-04-20 PROCEDURE — 80048 BASIC METABOLIC PNL TOTAL CA: CPT

## 2022-04-20 PROCEDURE — 2060000000 HC ICU INTERMEDIATE R&B

## 2022-04-20 PROCEDURE — 97535 SELF CARE MNGMENT TRAINING: CPT

## 2022-04-20 PROCEDURE — 97116 GAIT TRAINING THERAPY: CPT

## 2022-04-20 PROCEDURE — 97530 THERAPEUTIC ACTIVITIES: CPT

## 2022-04-20 PROCEDURE — 36415 COLL VENOUS BLD VENIPUNCTURE: CPT

## 2022-04-20 PROCEDURE — 6370000000 HC RX 637 (ALT 250 FOR IP): Performed by: INTERNAL MEDICINE

## 2022-04-20 PROCEDURE — 99232 SBSQ HOSP IP/OBS MODERATE 35: CPT | Performed by: FAMILY MEDICINE

## 2022-04-20 RX ORDER — INSULIN LISPRO 100 [IU]/ML
0-9 INJECTION, SOLUTION INTRAVENOUS; SUBCUTANEOUS NIGHTLY
Status: DISCONTINUED | OUTPATIENT
Start: 2022-04-20 | End: 2022-04-28 | Stop reason: HOSPADM

## 2022-04-20 RX ORDER — INSULIN LISPRO 100 [IU]/ML
0-18 INJECTION, SOLUTION INTRAVENOUS; SUBCUTANEOUS
Status: DISCONTINUED | OUTPATIENT
Start: 2022-04-20 | End: 2022-04-28 | Stop reason: HOSPADM

## 2022-04-20 RX ORDER — INSULIN GLARGINE 100 [IU]/ML
60 INJECTION, SOLUTION SUBCUTANEOUS 2 TIMES DAILY
Qty: 5 PEN | Refills: 3 | Status: ON HOLD | OUTPATIENT
Start: 2022-04-20 | End: 2022-06-10 | Stop reason: SDUPTHER

## 2022-04-20 RX ORDER — CEPHALEXIN 500 MG/1
500 CAPSULE ORAL 2 TIMES DAILY
Qty: 14 CAPSULE | Refills: 0 | Status: SHIPPED | OUTPATIENT
Start: 2022-04-20 | End: 2022-04-27

## 2022-04-20 RX ORDER — CALCIUM CARBONATE 200(500)MG
500 TABLET,CHEWABLE ORAL 3 TIMES DAILY PRN
Status: DISCONTINUED | OUTPATIENT
Start: 2022-04-20 | End: 2022-04-28 | Stop reason: HOSPADM

## 2022-04-20 RX ORDER — FLUCONAZOLE 100 MG/1
100 TABLET ORAL DAILY
Qty: 7 TABLET | Refills: 0 | Status: SHIPPED | OUTPATIENT
Start: 2022-04-20 | End: 2022-04-27

## 2022-04-20 RX ADMIN — INSULIN GLARGINE 60 UNITS: 100 INJECTION, SOLUTION SUBCUTANEOUS at 10:04

## 2022-04-20 RX ADMIN — ACETAMINOPHEN 650 MG: 325 TABLET ORAL at 12:16

## 2022-04-20 RX ADMIN — BUSPIRONE HYDROCHLORIDE 7.5 MG: 5 TABLET ORAL at 21:38

## 2022-04-20 RX ADMIN — SODIUM BICARBONATE 650 MG: 650 TABLET ORAL at 21:39

## 2022-04-20 RX ADMIN — PANTOPRAZOLE SODIUM 40 MG: 40 TABLET, DELAYED RELEASE ORAL at 06:33

## 2022-04-20 RX ADMIN — DOCUSATE SODIUM 100 MG: 100 CAPSULE, LIQUID FILLED ORAL at 21:40

## 2022-04-20 RX ADMIN — INSULIN GLARGINE 60 UNITS: 100 INJECTION, SOLUTION SUBCUTANEOUS at 21:36

## 2022-04-20 RX ADMIN — ANTACID TABLETS 500 MG: 500 TABLET, CHEWABLE ORAL at 12:16

## 2022-04-20 RX ADMIN — CARVEDILOL 3.12 MG: 3.12 TABLET, FILM COATED ORAL at 21:39

## 2022-04-20 RX ADMIN — SODIUM BICARBONATE 650 MG: 650 TABLET ORAL at 10:06

## 2022-04-20 RX ADMIN — GABAPENTIN 300 MG: 300 CAPSULE ORAL at 10:06

## 2022-04-20 RX ADMIN — SODIUM CHLORIDE, PRESERVATIVE FREE 10 ML: 5 INJECTION INTRAVENOUS at 10:10

## 2022-04-20 RX ADMIN — RANOLAZINE 1000 MG: 1000 TABLET, FILM COATED, EXTENDED RELEASE ORAL at 21:46

## 2022-04-20 RX ADMIN — Medication 9 MG: at 22:41

## 2022-04-20 RX ADMIN — INSULIN LISPRO 5 UNITS: 100 INJECTION, SOLUTION INTRAVENOUS; SUBCUTANEOUS at 21:36

## 2022-04-20 RX ADMIN — ACETAMINOPHEN 650 MG: 325 TABLET ORAL at 17:51

## 2022-04-20 RX ADMIN — FEBUXOSTAT 40 MG: 40 TABLET, FILM COATED ORAL at 10:09

## 2022-04-20 RX ADMIN — GABAPENTIN 300 MG: 300 CAPSULE ORAL at 21:39

## 2022-04-20 RX ADMIN — INSULIN LISPRO 6 UNITS: 100 INJECTION, SOLUTION INTRAVENOUS; SUBCUTANEOUS at 12:36

## 2022-04-20 RX ADMIN — HEPARIN SODIUM 5000 UNITS: 5000 INJECTION INTRAVENOUS; SUBCUTANEOUS at 14:40

## 2022-04-20 RX ADMIN — ANTI-FUNGAL POWDER MICONAZOLE NITRATE TALC FREE: 1.42 POWDER TOPICAL at 10:12

## 2022-04-20 RX ADMIN — TRAZODONE HYDROCHLORIDE 50 MG: 50 TABLET ORAL at 22:40

## 2022-04-20 RX ADMIN — FUROSEMIDE 80 MG: 40 TABLET ORAL at 10:06

## 2022-04-20 RX ADMIN — BUSPIRONE HYDROCHLORIDE 7.5 MG: 5 TABLET ORAL at 10:06

## 2022-04-20 RX ADMIN — SODIUM BICARBONATE 650 MG: 650 TABLET ORAL at 14:40

## 2022-04-20 RX ADMIN — HEPARIN SODIUM 5000 UNITS: 5000 INJECTION INTRAVENOUS; SUBCUTANEOUS at 06:33

## 2022-04-20 RX ADMIN — SODIUM CHLORIDE 25 ML: 9 INJECTION, SOLUTION INTRAVENOUS at 12:12

## 2022-04-20 RX ADMIN — ANTI-FUNGAL POWDER MICONAZOLE NITRATE TALC FREE: 1.42 POWDER TOPICAL at 21:41

## 2022-04-20 RX ADMIN — INSULIN LISPRO 6 UNITS: 100 INJECTION, SOLUTION INTRAVENOUS; SUBCUTANEOUS at 17:50

## 2022-04-20 RX ADMIN — CARVEDILOL 3.12 MG: 3.12 TABLET, FILM COATED ORAL at 10:07

## 2022-04-20 RX ADMIN — BUSPIRONE HYDROCHLORIDE 7.5 MG: 5 TABLET ORAL at 14:40

## 2022-04-20 RX ADMIN — RANOLAZINE 1000 MG: 1000 TABLET, FILM COATED, EXTENDED RELEASE ORAL at 10:09

## 2022-04-20 RX ADMIN — ATORVASTATIN CALCIUM 80 MG: 80 TABLET, FILM COATED ORAL at 10:06

## 2022-04-20 RX ADMIN — ASPIRIN 81 MG: 81 TABLET, COATED ORAL at 10:07

## 2022-04-20 RX ADMIN — ANTACID TABLETS 500 MG: 500 TABLET, CHEWABLE ORAL at 17:51

## 2022-04-20 RX ADMIN — FLUCONAZOLE 200 MG: 200 INJECTION, SOLUTION INTRAVENOUS at 12:13

## 2022-04-20 RX ADMIN — DOCUSATE SODIUM 100 MG: 100 CAPSULE, LIQUID FILLED ORAL at 10:07

## 2022-04-20 RX ADMIN — HEPARIN SODIUM 5000 UNITS: 5000 INJECTION INTRAVENOUS; SUBCUTANEOUS at 21:38

## 2022-04-20 ASSESSMENT — PAIN SCALES - GENERAL
PAINLEVEL_OUTOF10: 0
PAINLEVEL_OUTOF10: 5
PAINLEVEL_OUTOF10: 0

## 2022-04-20 ASSESSMENT — PAIN DESCRIPTION - DESCRIPTORS
DESCRIPTORS: SORE;SHARP
DESCRIPTORS: ACHING

## 2022-04-20 ASSESSMENT — PAIN DESCRIPTION - LOCATION
LOCATION: BUTTOCKS
LOCATION: BUTTOCKS

## 2022-04-20 ASSESSMENT — PAIN - FUNCTIONAL ASSESSMENT: PAIN_FUNCTIONAL_ASSESSMENT: ACTIVITIES ARE NOT PREVENTED

## 2022-04-20 ASSESSMENT — PAIN DESCRIPTION - ORIENTATION
ORIENTATION: RIGHT
ORIENTATION: RIGHT;LEFT

## 2022-04-20 NOTE — PLAN OF CARE
Problem: Falls - Risk of:  Goal: Will remain free from falls  Description: Will remain free from falls  Outcome: Progressing  Goal: Absence of physical injury  Description: Absence of physical injury  Outcome: Progressing   Patient remained free from fall/injury this shift. Bed always low/locked, bed exit armed, with call button and belongings in reach. Non-slip footwear provided.    Problem: Skin Integrity:  Goal: Will show no infection signs and symptoms  Description: Will show no infection signs and symptoms  Outcome: Progressing  Goal: Absence of new skin breakdown  Description: Absence of new skin breakdown  Outcome: Progressing     Problem: Musculor/Skeletal Functional Status  Goal: Highest potential functional level  Outcome: Progressing  Goal: Absence of falls  Outcome: Progressing     Problem: Pain:  Goal: Pain level will decrease  Description: Pain level will decrease  Outcome: Progressing  Goal: Control of acute pain  Description: Control of acute pain  Outcome: Progressing  Goal: Control of chronic pain  Description: Control of chronic pain  Outcome: Progressing     Problem: Discharge Planning  Goal: Discharge to home or other facility with appropriate resources  Outcome: Progressing     Problem: Chronic Conditions and Co-morbidities  Goal: Patient's chronic conditions and co-morbidity symptoms are monitored and maintained or improved  Outcome: Progressing

## 2022-04-20 NOTE — FLOWSHEET NOTE
Patient known from previous admissions; patient talked about her hopes of being able to go to rehab at [facility]; welcomed prayer     04/20/22 1908   Encounter Summary   Encounter Overview/Reason  Spiritual/Emotional Needs   Service Provided For: Patient   Referral/Consult From: Nhi   Last Encounter    (4/20/22)   Complexity of Encounter Low   Spiritual/Emotional needs   Type Spiritual Support   Assessment/Intervention/Outcome   Assessment Calm;Coping; Hopeful   Intervention Active listening;Discussed illness injury and its impact;Prayer (assurance of)/[facility]   Outcome Comfort;Coping;Expressed feelings, needs, and concerns;Expressed Gratitude;Receptive

## 2022-04-20 NOTE — PROGRESS NOTES
Patient required 2 person assist with walker and gait belt to ambulate to bathroom; c/o leg weakness while ambulating and legs buckled and patient would have fell if staff not assisting her to toilet. When attempting to return to bed, patient barely able to stand up from toilet. Jerking/buckling motion noted in legs upon standing. Required sera-steady to return to bed.

## 2022-04-20 NOTE — PROGRESS NOTES
RN spoke with Dr. Olivia Dugan via perfect serve about anticipated discharge. MD states that patient may discharge today if ortho BPs are negative.

## 2022-04-20 NOTE — PROGRESS NOTES
Pulmonary/CCM follow-up    Patient has been on Diflucan and Rocephin for 1 week, urine cultures were negative; therefore we will go ahead and discontinue them.

## 2022-04-20 NOTE — CONSULTS
Physical Medicine & Rehabilitation  Consult Note      Admitting Physician: Thao Louie MD    Primary Care Provider: AFSANEH Fisher CNP     Reason for Consult:  Acute Inpatient Rehabilitation    Chief Complaint: Shortness of breath    History of Present Illness:  Referring Provider is requesting an evaluation for appropriate placement upon discharge from acute care. Ms. Ranjith Hill is a 57 y.o. female who was admitted to [facility] on 4/13/2022 with Shortness of Breath    69-year-old female admitted with shortness of breath, has known history of CHF, type 2 diabetes, end-stage renal disease on hemodialysis, lymphedema. She notes that shortness of breath has progressively worsened last few days. She got full dialysis treatment and notes did not improve her shortness of breath. She reported associated chest pain and nausea without vomiting. She is on chronic 3 L O2 per nasal cannula at home.   On admission patient was placed on DKA protocol and insulin drip nephrology consulted for end-stage renal disease on dialysis, allergies consulted for CHF and fluid overload she was started on IV Rocephin for acute cystitis    Cardiology 4/15- resume Eliquis if okay with primary, nephrology to follow volume status, elevated troponin demand ischemia secondary to DKA, CHF and AURELIA, has history of coronary disease with prior CABG and morbid obesity, follow-up in 2-week    Family practice 4/20 back on daily insulin and sliding scale coverage, chronic hypoxic respiratory failure-on O2 pulmonary following, end-stage renal disease on dialysis, nephrology following on Rocephin for UTI, TIA-like symptoms-MRI and MRA negative, neurology following    Nephrology continue dialysis, hold Flomax, decrease Lasix monitor BMP patient continues to make urine 4 to 500 cc a day noted patient was taken off dialysis 6 weeks previously and restarted due to worsening fluid overload 1 week previously    Neurology 4/19-had episode of unresponsiveness while sitting up likely orthostatic she was on Flomax in the morning which has been discontinued MRI brain and MRA negative felt to be orthostatic- resumed midodrine    Pulmonary- right lower lobe pneumonia/atelectasis versus fluid overload, chronic hypoxic respiratory insufficiency on 2 L oxygen, obstructive sleep apnea by clinical diagnosis will need sleep study as outpatient continue home O2 follow-up in office in 4 weeks. Discontinue Diflucan and Rocephin as urine cultures were negative    Radiology:  MRA HEAD WO CONTRAST    Result Date: 4/18/2022  1. No acute intracranial abnormality. 2. Mild chronic white matter microvascular ischemic changes. 3. Normal MRA of the head. XR CHEST PORTABLE    Result Date: 4/19/2022  Cardiomegaly with mild congestion. XR CHEST PORTABLE    Result Date: 4/15/2022  Improved pulmonary vascular congestion relative to 2 days prior with minimal residual strandy perihilar and bibasilar densities which may be due to atelectasis or improving edema. Similar small left pleural effusion. MRI BRAIN WO CONTRAST    Result Date: 4/18/2022  1. No acute intracranial abnormality. 2. Mild chronic white matter microvascular ischemic changes. 3. Normal MRA of the head.      Review of Systems:  Constitutional: negative for anorexia, chills, fatigue, fevers, sweats and weight loss  Eyes: negative for redness and visual disturbance  Ears, nose, mouth, throat, and face: negative for earaches, sore throat and tinnitus  Respiratory: negative for cough and shortness of breath  Cardiovascular: negative for chest pain, dyspnea and palpitations  Gastrointestinal: negative for abdominal pain, change in bowel habits, constipation, nausea and vomiting  Genitourinary:negative for dysuria, frequency, hesitancy and urinary incontinence  Integument/breast: negative for pruritus and rash  Musculoskeletal:negative for muscle weakness and stiff joints  Neurological: negative for dizziness, headaches and weakness  Behavioral/Psych: negative for decreased appetite, depression and fatigue    Functional History:  PTA: Independent with all activities. Current:  PT:  Restrictions/Precautions: General Precautions,Fall Risk,Up as Tolerated   Comment: STANDING: /70 HR 73. Post ambulation, patients SpO2 was 80%. Afetr a few minutes, patients SpO2 >92%  Bed Mobility Training  Bed Mobility Training: Yes  Overall Level of Assistance: Stand-by assistance  Rolling: Stand-by assistance  Supine to Sit: Stand-by assistance  Sit to Supine: Stand-by assistance  Scooting: Stand-by assistance (to EOB)  Balance  Sitting: Without support  Standing: With support  Transfer Training  Transfer Training: Yes  Overall Level of Assistance: Stand-by assistance;Minimum assistance  Interventions: Safety awareness training;Verbal cues  Sit to Stand: Stand-by assistance;Minimum assistance (Min A from lower surface )  Stand to Sit: Stand-by assistance  Bed to Chair: Stand-by assistance  Toilet Transfer: Minimum assistance  Gait Training  Gait Training: Yes  Right Side Weight Bearing: Full  Left Side Weight Bearing: Full            OT:   ADL  Feeding: Setup  Grooming: Setup  UE Bathing: Stand by assistance  LE Bathing: Moderate assistance  UE Dressing: Stand by assistance  LE Dressing: Moderate assistance  Toileting: Moderate assistance  Toileting Skilled Clinical Factors: A with standing balance and hygiene due to tremors in BLE and BUE  Additional Comments: ADL scores based on clinical reasoning and skilled observation unless otherwise noted. OT facilitated pts engagement in toileting task. Pt demonstrated tremors in BLE and BUE during hygiene requiring a seated rest break. Therapist A with remainder of task for safety. Pt currently limited due to decreased strength, balance, and activity tolerance impacting safety and independence with self care tasks.      ST:      Past Medical History:        Diagnosis Date    Backache, unspecified     CHF (congestive heart failure) (HCC)     Chronic kidney disease     Coronary atherosclerosis of artery bypass graft     COVID 1/31/2022    Cramp of limb     Gallstones     Hypertension     Insomnia     Pneumonia     Type II or unspecified type diabetes mellitus with renal manifestations, not stated as uncontrolled(250.40)     Type II or unspecified type diabetes mellitus without mention of complication, not stated as uncontrolled     Unspecified vitamin D deficiency        Past Surgical History:        Procedure Laterality Date    ANKLE FRACTURE SURGERY      AV FISTULA CREATION  12/14/2021    BREAST SURGERY      CARPAL TUNNEL RELEASE      CHOLECYSTECTOMY, OPEN N/A     CORONARY ARTERY BYPASS GRAFT      x3    DIALYSIS FISTULA CREATION Left 12/14/2021    LEFT AV FISTULA CREATION UPPER EXTREMITY performed by Ami De Paz MD at [address] [facility] IR TUNNELED CATHETER PLACEMENT GREATER THAN 5 YEARS  8/18/2021    IR TUNNELED CATHETER PLACEMENT GREATER THAN 5 YEARS 8/18/2021 [facility] SPECIAL PROCEDURES    KNEE ARTHROSCOPY      right    OTHER SURGICAL HISTORY  04/06/2022    cvc exchange    TONSILLECTOMY         Allergies:     Allergies   Allergen Reactions    Adhesive Tape Other (See Comments) and Rash     Other reaction(s): Unknown    Ace Inhibitors Other (See Comments)     cough    Iv Dye [Iodides]      Stage 4 kidney disease    Nsaids      CANNOT TAKE D/T KIDNEY DISEASE    Metformin And Related Hives, Itching and Rash        Current Medications:   Current Facility-Administered Medications: insulin lispro (HUMALOG) injection vial 0-18 Units, 0-18 Units, SubCUTAneous, TID WC  insulin lispro (HUMALOG) injection vial 0-9 Units, 0-9 Units, SubCUTAneous, Nightly  calcium carbonate (TUMS) chewable tablet 500 mg, 500 mg, Oral, TID PRN  furosemide (LASIX) tablet 80 mg, 80 mg, Oral, Daily  insulin glargine (LANTUS) injection vial 60 Units, 60 Units, SubCUTAneous, BID  polyethylene glycol (GLYCOLAX) packet 17 g, 17 g, Oral, Daily PRN  aspirin EC tablet 81 mg, 81 mg, Oral, Daily  sodium chloride flush 0.9 % injection 5-40 mL, 5-40 mL, IntraVENous, 2 times per day  sodium chloride flush 0.9 % injection 5-40 mL, 5-40 mL, IntraVENous, PRN  0.9 % sodium chloride infusion, , IntraVENous, PRN  ondansetron (ZOFRAN-ODT) disintegrating tablet 4 mg, 4 mg, Oral, Q8H PRN **OR** ondansetron (ZOFRAN) injection 4 mg, 4 mg, IntraVENous, Q6H PRN  ranolazine (RANEXA) extended release tablet 1,000 mg, 1,000 mg, Oral, BID  busPIRone (BUSPAR) tablet 7.5 mg, 7.5 mg, Oral, TID  pantoprazole (PROTONIX) tablet 40 mg, 40 mg, Oral, QAM AC  atorvastatin (LIPITOR) tablet 80 mg, 80 mg, Oral, Daily  febuxostat (ULORIC) tablet 40 mg, 40 mg, Oral, Daily  docusate sodium (COLACE) capsule 100 mg, 100 mg, Oral, BID  [Held by provider] tamsulosin (FLOMAX) capsule 0.4 mg, 0.4 mg, Oral, Daily  [Held by provider] insulin lispro (HUMALOG) injection vial 15 Units, 15 Units, SubCUTAneous, TID AC  sodium bicarbonate tablet 650 mg, 650 mg, Oral, TID  traZODone (DESYREL) tablet 50 mg, 50 mg, Oral, Nightly  gabapentin (NEURONTIN) capsule 300 mg, 300 mg, Oral, BID  melatonin tablet 9 mg, 9 mg, Oral, Nightly  lidocaine-prilocaine (EMLA) cream, , Topical, PRN  carvedilol (COREG) tablet 3.125 mg, 3.125 mg, Oral, BID  heparin (porcine) injection 5,000 Units, 5,000 Units, SubCUTAneous, 3 times per day  sodium phosphate 10 mmol in dextrose 5 % 250 mL IVPB, 10 mmol, IntraVENous, Once  anticoagulant sodium citrate 4 % injection 1.6 mL, 1.6 mL, IntraCATHeter, PRN  anticoagulant sodium citrate 4 % injection 1.6 mL, 1.6 mL, IntraCATHeter, PRN  glucose (GLUTOSE) 40 % oral gel 15 g, 15 g, Oral, PRN  dextrose 50 % IV solution, 12.5 g, IntraVENous, PRN  glucagon (rDNA) injection 1 mg, 1 mg, IntraMUSCular, PRN  miconazole (MICOTIN) 2 % powder, , Topical, BID  acetaminophen (TYLENOL) tablet 650 mg, 650 mg, Oral, Q4H PRN    Social History:  Social History     Socioeconomic History    Marital status: Single     Spouse name: Not on file    Number of children: Not on file    Years of education: Not on file    Highest education level: Not on file   Occupational History    Not on file   Tobacco Use    Smoking status: Never Smoker    Smokeless tobacco: Never Used   Vaping Use    Vaping Use: Never used   Substance and Sexual Activity    Alcohol use: No    Drug use: No    Sexual activity: Not Currently   Other Topics Concern    Not on file   Social History Narrative    Not on file     Social Determinants of Health     Financial Resource Strain: Low Risk     Difficulty of Paying Living Expenses: Not very hard   Food Insecurity: No Food Insecurity    Worried About Running Out of Food in the Last Year: Never true    Nany of Food in the Last Year: Never true   Transportation Needs: No Transportation Needs    Lack of Transportation (Medical): No    Lack of Transportation (Non-Medical): No   Physical Activity: Inactive    Days of Exercise per Week: 0 days    Minutes of Exercise per Session: 0 min   Stress: Stress Concern Present    Feeling of Stress :  To some extent   Social Connections: Socially Isolated    Frequency of Communication with Friends and Family: More than three times a week    Frequency of Social Gatherings with Friends and Family: More than three times a week    Attends Restorationist Services: Never    Active Member of Clubs or Organizations: No    Attends Club or Organization Meetings: Never    Marital Status: Never    Intimate Partner Violence:     Fear of Current or Ex-Partner: Not on file    Emotionally Abused: Not on file    Physically Abused: Not on file    Sexually Abused: Not on file   Housing Stability: [address] Unable to Pay for Housing in the Last Year: No    Number of [place] in the Last Year: 1    Unstable Housing in the Last Year: No     Social/Functional History  Social/Functional History  Lives With: Family (Mother-pt is caregiver for her mom)  Type of Home: House (condo)  Home Layout: One level  Home Access: Ramped entrance  Bathroom Shower/Tub: Walk-in shower  Bathroom Toilet: Handicap height  Bathroom Equipment: Grab bars in shower,Built-in shower seat  Bathroom Accessibility: [facility]: [address]: [address]: Needs assistance (sisters visit to assist)  Homemaking Responsibilities: Yes  Ambulation Assistance: Independent (with 8QB)  Transfer Assistance: Independent  Active : No  Patient's  Info: famile - sister primarily  Mode of Transportation: Car  IADL Comments: pt sleeps in a flat bed  Additional Comments: Pt is the primary caregiver for her mother at home and sisters come to assist with caregiver needs and homecare. Pt will have continued support from sisters upon d/c    Family History:       Problem Relation Age of Onset    Diabetes Father     Heart Failure Father            Physical Exam:    BP (!) 115/45   Pulse 65   Temp 98 °F (36.7 °C) (Oral)   Resp 18   Ht 5' 5\" (1.651 m)   Wt 264 lb 12.4 oz (120.1 kg)   SpO2 95%   BMI 44.06 kg/m²     General appearance: alert, appears stated age, cooperative, and no distress  HEENT: Normocephalic, without obvious abnormality, atraumatic               Eyes: conjunctivae clear. Throat: tongue normal.               Neck:  symmetrical, trachea midline. Pulm: clear to auscultation bilaterally. On O2  Cardiac: regular rate and rhythm, no murmur. Abdomen: soft, non-tender; bowel sounds normal.  MSK: extremities normal, atraumatic, lymphedema  ROM: Functional range of motion upper extremities distal lower extremity  Mental status/Psych: Alert, orientedX3, thought content appropriate.   Knew year, president and location, follows command  Skin:     Neuro:      Sensory: Intact in BUE and BLE to soft and pin sensation. Motor: Muscle tone and bulk are normal bilaterally. No pronator drift. 4+/5 upper extremities, ankle 4+/5 proximal at least antigravity though limited        Coordination: finger to nose normal bilaterally. Diagnostics:  CBC   Lab Results   Component Value Date    WBC 5.4 04/20/2022    RBC 3.36 04/20/2022    HGB 10.5 04/20/2022    HCT 31.6 04/20/2022    MCV 94.2 04/20/2022    RDW 15.7 04/20/2022     04/20/2022     BMP    Lab Results   Component Value Date     04/20/2022    K 4.3 04/20/2022     04/20/2022    CO2 24 04/20/2022    BUN 25 04/20/2022     Uric Acid  No components found for: URIC  VITAMIN B12 No components found for: B12  PT/INR  No results found for: PTINR      Impression: Ms. Eufemia Lepe is a 57 y.o. female with a history of Diabetic ketoacidosis without coma associated with type 2 diabetes mellitus ([facility] [address]    1. Right lower lobe pneumonia/atelectasis/fluid overload and DKA  2. Chronic hypoxic respiratory insufficiency on 2 to 3 L oxygen  3. Obstructive sleep apnea clinically  4. End-stage renal disease on hemodialysis  5. CHF/history of CABG #hyperlipidemia/hypertension-Lasix, Coreg, Lipitor, 1500 mL fluid restriction  6. Type 2 diabetes/DKA-insulin and sliding scale insulin  7. Neuropathy-Neurontin  8. lymphedema  9. Morbid obesity BMI 44.06  10. TIA versus orthostatic hypotension-Flomax on hold  11. UTI-off antibiotics as cultures negative  12. 4/13 COVID-19 not detected, 3/19 not detected, 2/16 not detected, 1/28 detected  13. Gout-Uloric  14. Depression-BuSpar and trazodone  15. GERD-Protonix  16. -Flomax on hold  17. History of DVT per cardiology- no objection to Eliquis per cardiology  18. Anemia hemoglobin 10.5  19. Patient in contact isolation    Recommendations:  1. Diagnosis: Deconditioning due to pneumonia/DKA/respiratory insufficiency  2.  Therapy: Has PT/OT needs, sit to stand standby assist, toilet transfers min assist, patient notes ambulate to bathroom with rolling walker with assist, mod assist lower extremity  3. Medical  Necessity: As above  4. 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 Rehab recommendation: Would benefit acute inpatient rehabilitation when medically ready    -Questionable resume Eliquis as per cardiology 4/15 note for history of DVT    6. DVT proph: Subcu heparin    It was my pleasure to evaluate Celia Lundberg today. Please call with questions. Renay Martinez. Ezra Sullivan MD          This note is created with the assistance of a speech recognition program.  While intending to generate a document that actually reflects the content of the visit, the document can still have some errors including those of syntax and sound a like substitutions which may escape proof reading.   In such instances, actual meaning can be extrapolated by contextual diversion

## 2022-04-20 NOTE — PROGRESS NOTES
Jason Bhatt MD/Carter Dixon MD/ Brooke Mcdonough MD/Jordan SHELBY AGACNP-BC, NP-C      Brynn Duverney APRN NP-C     Neto SHELBY NP-C                                           Pulmonary Progress Note    Patient - Jacinta Ray   Age - 57 y.o.   - 1960  MRN - [id number]  Acct # - [de-identified]  Date of Admission - 2022  3:17 PM    Consulting Service/Physician:       Primary Care Physician: AFSANEH Park - CNP    SUBJECTIVE:     Chief Complaint:   Chief Complaint   Patient presents with    Shortness of Breath     Subjective:    I was told that she has been fairly asymptomatic since her syncopal episode occurred a few days ago, blood pressures have been relatively stable. Unfortunately she is very weak and unsteady when ambulating with PT this morning. She tells me she is still going to go home and not to rehab. There is a discharge planning meeting later this morning to reevaluate. Pulmonary wise she has remained fairly stable and is on her baseline oxygen. VITALS  BP (!) 115/44   Pulse 63   Temp 97.9 °F (36.6 °C) (Oral)   Resp 18   Ht 5' 5\" (1.651 m)   Wt 264 lb 12.4 oz (120.1 kg)   SpO2 94%   BMI 44.06 kg/m²   Wt Readings from Last 3 Encounters:   22 264 lb 12.4 oz (120.1 kg)   22 248 lb (112.5 kg)   22 250 lb (113.4 kg)     I/O (24 Hours)    Intake/Output Summary (Last 24 hours) at 2022 0936  Last data filed at 2022 0800  Gross per 24 hour   Intake 1080 ml   Output --   Net 1080 ml     Ventilator:      Exam:   Physical Exam   Constitutional:  Oriented to person, place, and time. Appears well-developed and well-nourished. Sitting up in bed no apparent distress, looks much better than the last several days  HENT: Unremarkable, nasal cannula in place  Head: Normocephalic and atraumatic. Eyes: EOM are normal. Pupils are equal, round, and reactive to light. Neck: Neck supple. Cardiovascular:  Regular rate and rhythm. Normal heart tones. No JVD. Pulmonary/Chest: Effort normal and breath sounds diminished due to body habitus, few faint bibasilar rales present, no wheezing, respirations easy nonlabored at rest on nasal cannula  Abdominal: Obese Soft. Bowel sounds are normal.   Musculoskeletal: Normal range of motion. Very weak  Neurological:patient is alert and oriented to person, place, and time. Skin: Skin is warm and dry.    Extremities: Bilateral lower extremity trace edema, improved  Infusions:      sodium chloride       Meds:     Current Facility-Administered Medications:     furosemide (LASIX) tablet 80 mg, 80 mg, Oral, Daily, Teressa Garzon MD    insulin glargine (LANTUS) injection vial 60 Units, 60 Units, SubCUTAneous, BID, Danette Foss MD, 60 Units at 04/19/22 2106    polyethylene glycol (GLYCOLAX) packet 17 g, 17 g, Oral, Daily PRN, Raza Hurtado MD    aspirin EC tablet 81 mg, 81 mg, Oral, Daily, Nani Wayne MD, 81 mg at 04/19/22 0856    insulin lispro (HUMALOG) injection vial 0-18 Units, 0-18 Units, SubCUTAneous, TID WC, Stacy Granado MD, 3 Units at 04/19/22 1722    insulin lispro (HUMALOG) injection vial 0-9 Units, 0-9 Units, SubCUTAneous, Nightly, Stacy Granado MD, 2 Units at 04/19/22 2107    sodium chloride flush 0.9 % injection 5-40 mL, 5-40 mL, IntraVENous, 2 times per day, Gio Redhead, DO, 10 mL at 04/19/22 2100    sodium chloride flush 0.9 % injection 5-40 mL, 5-40 mL, IntraVENous, PRN, Gio Redhead, DO    0.9 % sodium chloride infusion, , IntraVENous, PRN, Gio Redhead, DO    ondansetron (ZOFRAN-ODT) disintegrating tablet 4 mg, 4 mg, Oral, Q8H PRN **OR** ondansetron (ZOFRAN) injection 4 mg, 4 mg, IntraVENous, Q6H PRN, Gio Redhead, DO    ranolazine [facility]) extended release tablet 1,000 mg, 1,000 mg, Oral, BID, Danette Foss MD, 1,000 mg at 04/19/22 2104    busPIRone (BUSPAR) tablet 7.5 mg, 7.5 mg, Oral, TID, Saúl Anderson MD, 7.5 mg at 04/19/22 2101    pantoprazole (PROTONIX) tablet 40 mg, 40 mg, Oral, QAM AC, Saúl Anderson MD, 40 mg at 04/20/22 9045    atorvastatin (LIPITOR) tablet 80 mg, 80 mg, Oral, Daily, Saúl Anderson MD, 80 mg at 04/19/22 0855    febuxostat (ULORIC) tablet 40 mg, 40 mg, Oral, Daily, Saúl Anderson MD, 40 mg at 04/19/22 0856    docusate sodium (COLACE) capsule 100 mg, 100 mg, Oral, BID, Saúl Anderson MD, 100 mg at 04/19/22 2101    [Held by provider] tamsulosin (FLOMAX) capsule 0.4 mg, 0.4 mg, Oral, Daily, Saúl Anderson MD, 0.4 mg at 04/18/22 0815    [Held by provider] insulin lispro (HUMALOG) injection vial 15 Units, 15 Units, SubCUTAneous, TID AC, Saúl Anderson MD, 15 Units at 04/16/22 1713    sodium bicarbonate tablet 650 mg, 650 mg, Oral, TID, Saúl Anderson MD, 650 mg at 04/19/22 2101    traZODone (DESYREL) tablet 50 mg, 50 mg, Oral, Nightly, Saúl Anderson MD, 50 mg at 04/19/22 2246    gabapentin (NEURONTIN) capsule 300 mg, 300 mg, Oral, BID, Saúl Anderson MD, 300 mg at 04/19/22 2101    melatonin tablet 9 mg, 9 mg, Oral, Nightly, Saúl Anderson MD, 9 mg at 04/19/22 2246    lidocaine-prilocaine (EMLA) cream, , Topical, PRN, Saúl Anderson MD    carvedilol (COREG) tablet 3.125 mg, 3.125 mg, Oral, BID, Saúl Anderson MD, 3.125 mg at 04/19/22 2101    heparin (porcine) injection 5,000 Units, 5,000 Units, SubCUTAneous, 3 times per day, Saúl Anderson MD, 5,000 Units at 04/20/22 5926    sodium phosphate 10 mmol in dextrose 5 % 250 mL IVPB, 10 mmol, IntraVENous, Once, Zuleyka Lucia MD    fluconazole (DIFLUCAN) in 0.9 % sodium chloride IVPB 200 mg, 200 mg, IntraVENous, Q24H, Micki Butler MD, Last Rate: 100 mL/hr at 04/19/22 1706, 200 mg at 04/19/22 1706    cefTRIAXone (ROCEPHIN) 2000 mg IVPB in D5W 50ml minibag, 2,000 mg, IntraVENous, Q24H, Micki Butler MD, Stopped at 04/19/22 2145    anticoagulant sodium citrate 4 % injection 1.6 mL, 1.6 mL, IntraCATHeter, PRN, Payton Sawyer MD, 1.6 mL at 04/19/22 1600    anticoagulant sodium citrate 4 % injection 1.6 mL, 1.6 mL, IntraCATHeter, PRN, Payton Sawyer MD, 1.6 mL at 04/19/22 1601    glucose (GLUTOSE) 40 % oral gel 15 g, 15 g, Oral, PRN, Sarah Devries MD    dextrose 50 % IV solution, 12.5 g, IntraVENous, PRN, Sarah Devries MD    glucagon (rDNA) injection 1 mg, 1 mg, IntraMUSCular, PRN, Sarah Devries MD    miconazole (MICOTIN) 2 % powder, , Topical, BID, Sarah Devries MD, Given at 04/19/22 2103    acetaminophen (TYLENOL) tablet 650 mg, 650 mg, Oral, Q4H PRN, Jackerik Mello, DO, 650 mg at 04/16/22 0713    Lab Results:     Lab Results   Component Value Date    WBC 5.4 04/20/2022    HGB 10.5 (L) 04/20/2022    HCT 31.6 (L) 04/20/2022    MCV 94.2 04/20/2022     (L) 04/20/2022     Lab Results   Component Value Date    CALCIUM 8.4 (L) 04/20/2022     04/20/2022    K 4.3 04/20/2022    CO2 24 04/20/2022     04/20/2022    BUN 25 (H) 04/20/2022    CREATININE 3.39 (H) 04/20/2022       Lab Results   Component Value Date    INR 0.9 11/10/2021    PROTIME 12.7 11/10/2021     2D echo 4/13/22  Left ventricle is normal in size. Moderate left ventricular hypertrophy. Global left ventricular systolic function is normal. Estimated LV EF >55 %. No obvious wall motion abnormality seen. Evidence of mild (grade I)  diastolic dysfunction. Left atrium is normal in size. Mild tricuspid regurgitation. No pulmonary hypertension. Estimated right ventricular systolic pressure is  [id number]. Radiology:       4/19/22                                                                                  4/15/22  Chest x-ray continues show some improvement, with vascular congestion still some congestion noted to the left lower lobe  ASSESSMENT:       1. RLL pneumonia/atelectasis versus fluid volume excess  2. DKA - resolved  3. HTN urgency  4.  UTI- on Ceftriaxone, culture with no signficant growth  5. ESRD on hemodialysis  6. Acute on Chronic diastolic heart failure-Grade 1  7. Syncopal episode yesterday thought to be related to orthostatic hypotension  8. Chronic hypoxic respiratory insufficiency-on 2 L  9. ERICK (clinical diagnosis)  10. Obesity  PLAN:   1. UTI per primary  2. On home dose of oxygen  3. Likely needs sleep study as an outpatient  4. No objection to discharge from our standpoint, but would recommend extended-care facility due to her extreme weakness and high risk for falls  5. Can f/u in our office in 4 weeks,  [phone]  6. D/w RN      Electronically signed by AFSANEH Mcguire CNP on 04/20/22     This progress note was completed using a voice transcription system. Every effort was made to ensure accuracy. However, inadvertent computerized transcription errors may be present.     SACHA SHELBY AGACNP-BC, NP-C  [facility] Pulmonary, Critical Care & Sleep

## 2022-04-20 NOTE — PROGRESS NOTES
Nutrition Assessment     Type and Reason for Visit: RD Nutrition Re-Screen/LOS    Nutrition Recommendations/Plan:   1. Continue 4 carbohydrate choices per tray     Malnutrition Assessment:  Malnutrition Status:  No Malnutrition    Nutrition Assessment:  Pt was admitted due to DKA. Plan is for discharge later today, Intake has been adequate. Nutrition Related Findings:     Wound Type: None    Current Nutrition Therapies:    ADULT DIET;  Regular; 4 carb choices (60 gm/meal); 1500 ml    Anthropometric Measures:  · Height: 5' 5\" (165.1 cm)  · Current Body Wt: 264 lb (119.7 kg)   · BMI: 43.9    Nutrition Diagnosis:   No nutrition diagnosis at this time     Nutrition Interventions:   Food and/or Nutrient Delivery: Continue Current Diet  Nutrition Education/Counseling: No recommendation at this time     Nutrition Monitoring and Evaluation:   Food/Nutrient Intake Outcomes: Food and Nutrient Intake  Physical Signs/Symptoms Outcomes: Biochemical Data    Discharge Planning:    Continue current diet     Gordon Rg, [address] [place],   Contact: [phone]

## 2022-04-20 NOTE — PROGRESS NOTES
Physical Therapy  Facility/Department: [facility] PROGRESSIVE CARE  Daily Treatment Note  NAME: Qasim Mathews  : 1960  MRN: [id number]    Date of Service: 2022    Discharge Recommendations:  (P) Patient would benefit from continued therapy after discharge   PT Equipment Recommendations  Equipment Needed: (P) No    Patient Diagnosis(es): The primary encounter diagnosis was Diabetic ketoacidosis without coma associated with type 2 diabetes mellitus ([facility] [address] A diagnosis of Dyspnea, unspecified type was also pertinent to this visit. Assessment   Activity Tolerance: (P) Patient tolerated treatment well;Patient limited by fatigue;Patient limited by endurance  Equipment Needed: (P) No     Plan    Plan  Specific Instructions for Next Treatment: (P) Progress endurance and ambulation distance with Rollator (or RW)  Current Treatment Recommendations: (P) Strengthening;Balance training;Functional mobility training;Transfer training; Endurance training;Gait training  PT Plan of Care: (P) Daily     Subjective    Subjective  Subjective: Patient laying in bed upon arrival, agreeable to co-treat. Co-treat with Gianni Delarosa. DEDE Thomas approved therapy   Orientation  Overall Orientation Status: Within Functional Limits     Objective   Vitals  Pulse: 66  Heart Rate Source: Monitor  BP: 107/72  BP Location: Right upper arm  BP Method: Automatic  Patient Position: Sitting  MAP (Calculated): 83.67  SpO2: 98 %  O2 Device: Nasal cannula  Comment: STANDING: /70 HR 73. Post ambulation, patients SpO2 was 80%.  Afetr a few minutes, patients SpO2 >92%  Bed Mobility Training  Bed Mobility Training: Yes  Overall Level of Assistance: Stand-by assistance  Rolling: Stand-by assistance  Supine to Sit: Stand-by assistance  Sit to Supine: Stand-by assistance  Scooting: Stand-by assistance (to EOB)  Balance  Sitting: Without support  Standing: With support  Transfer Training  Transfer Training: Yes  Overall Level of Assistance: Stand-by assistance;Minimum assistance  Interventions: Safety awareness training;Verbal cues  Sit to Stand: Stand-by assistance;Minimum assistance (Min A from lower surface )  Stand to Sit: Stand-by assistance  Bed to Chair: Stand-by assistance  Toilet Transfer: Minimum assistance  Gait Training  Gait Training: Yes  Right Side Weight Bearing: Full  Left Side Weight Bearing: Full     PT Exercises  Pressure Relief Exercises: (P) bed mobility x2  Static Sitting Balance Exercises: (P) seated EOB ~ 15 minutes SBA   Dynamic Standing Balance Exercises: (P) standing performing pericare x2         Patient Education  Education Given To: (P) Patient  Education Provided: (P) Fall Prevention Strategies; Plan of Care  Education Method: (P) Verbal  Barriers to Learning: (P) None  Education Outcome: (P) Continued education needed;Verbalized understanding    Goals  Short Term Goals  Time Frame for Short term goals: 5 days  Short term goal 1: pt to demo independent bed mobility   Short term goal 2: pt to perform all Transfers with Rollator (or RW), Mod I  Short term goal 3: pt to ambulate household distances of 54-65' with Rollator (or RW) to improve independence and safety with mobility. Short term goal 4: pt to improve standing balance to good with Use of AD  Short term goal 5: pt to improve standing tolerance to 10 minutes to improve tolerance for daily activities and ADLs  Patient Goals   Patient goals : To return home safely.       Therapy Time   Individual Concurrent Group Co-treatment   Time In (P) 0901         Time Out (P) 0934         Minutes (P) 33                 Electronically signed by Edelmira Capone PTA on 4/20/22 at 2:14 PM EDT

## 2022-04-20 NOTE — CARE COORDINATION
ONGOING DISCHARGE PLAN:    Patient is alert and oriented x4. Spoke with patient regarding discharge plan and patient confirms that plan was to go home with [place] [facility] with her mother. Pt confirms that her mother will not be able to assist her n her needs. Pt working with patient stated patient was really weak and would rec more therapy. Pt wanted Acute Rehab then [facility]. Informed pt that the [facility] did not have beds available at this time. Pt wants ARU. CM spoke to DEDE Waters who sent perfect serve to Dr Ashley Vinson for PM&R consult. SW notified     Will continue to follow for additional discharge needs.     Electronically signed by Jennifer Horan RN on 4/20/2022 at 1:43 PM

## 2022-04-20 NOTE — PROGRESS NOTES
[address]   Occupational Therapy Evaluation  Date: 22  Patient Name: Jessica Yin       Room: 1206/9519-04  MRN: [id number]  Account: [de-identified]   : 1960  (57 y.o.) Gender: female     Discharge Recommendations:  Further Occupational Therapy is recommended upon facility discharge. OT Equipment Recommendations  Equipment Needed:  (TBD)       Diagnosis: DKA       Treatment Diagnosis: Impaired self care status  Past Medical History:  has a past medical history of Backache, unspecified, CHF (congestive heart failure) ([address] Chronic kidney disease, Coronary atherosclerosis of artery bypass graft, COVID, Cramp of limb, Gallstones, Hypertension, Insomnia, Pneumonia, Type II or unspecified type diabetes mellitus with renal manifestations, not stated as uncontrolled(250.40), Type II or unspecified type diabetes mellitus without mention of complication, not stated as uncontrolled, and Unspecified vitamin D deficiency. Past Surgical History:   has a past surgical history that includes Coronary artery bypass graft; Knee arthroscopy; Carpal tunnel release; Breast surgery; Tonsillectomy; Hand surgery; Ankle fracture surgery; Cholecystectomy, open (N/A); IR TUNNELED CVC PLACE WO SQ PORT/PUMP > 5 YEARS (2021); AV fistula creation (2021); Dialysis fistula creation (Left, 2021); and other surgical history (2022).     Restrictions  Restrictions/Precautions: General Precautions,Fall Risk,Up as Tolerated  Required Braces or Orthoses?: Yes (Lymphadema wraps)     Vitals  Temp: 98 °F (36.7 °C)  Pulse: 65  Resp: 18  BP: (!) 115/45  Height: 5' 5\" (165.1 cm)  Weight: 264 lb 12.4 oz (120.1 kg)  BMI (Calculated): 44.2  Oxygen Therapy  SpO2: 95 %  Pulse Oximeter Device Mode: Intermittent  Pulse Oximeter Device Location: Finger  O2 Device: Nasal cannula  O2 Flow Rate (L/min): 2 L/min  Blood Gas  Performed?: Yes  Joao's Test #1: Collateral flow confirmed  Site #1: Right Radial  Site Prepped #1: Yes  Number of Attempts #1: 1  Pressure Held #1: Yes  Complications #1: None  Post-procedure #1: Standard  Specimen Status #1: Point of care  How Tolerated?: Tolerated well  Blood Pressure Lyin/54  Pulse Lyin PER MINUTE  Blood Pressure Sittin/72  Pulse Sittin PER MINUTE  Blood Pressure Standin/70  Pulse Standin PER MINUTE  Level of Consciousness: Alert (0)    Subjective  Subjective: Pt resting in bed upon arrival. pt was agreeable to OT/PT   Comments: OK per RN for OT/PT with PTA Wendy     Vision  Vision: Impaired  Vision Exceptions: Wears glasses for reading  Hearing  Hearing: Within functional limits  Social/Functional History  Lives With: Family (Mother-pt is caregiver for her mom)  [facility]  Home Layout: One level  Home Access: Ramped entrance  Bathroom Shower/Tub: Walk-in shower  Bathroom Toilet: Handicap height  Bathroom Equipment: Grab bars in shower,Built-in shower seat  Bathroom Accessibility: Accessible  Home Equipment: Grab bars,Cane,Sock aid,Reacher,4 wheeled walker  ADL Assistance: Independent  Homemaking Assistance: Needs assistance (sisters visit to assist)  Homemaking Responsibilities: Yes  Ambulation Assistance: Independent (with 0LG)  Transfer Assistance: Independent  Active : No  Patient's  Info: family - sister primarily  Mode of Transportation: Car  IADL Comments: pt sleeps in a flat bed  Additional Comments: Pt is the primary caregiver for her mother at home and sisters come to assist with caregiver needs and homecare. Pt will have continued support from sisters upon d/c  Pain Assessment  Pain Assessment: 0-10  Pain Level: 0    Objective              ADL  Feeding: Setup  Grooming: Setup  UE Bathing: Stand by assistance  LE Bathing: Moderate assistance  UE Dressing: Stand by assistance  LE Dressing: Moderate assistance  Toileting:  Moderate assistance  Toileting Skilled Clinical Factors: A with standing balance and hygiene due to tremors in BLE and BUE  Additional Comments: ADL scores based on clinical reasoning and skilled observation unless otherwise noted. OT facilitated pts engagement in toileting task. Pt demonstrated tremors in BLE and BUE during hygiene requiring a seated rest break. Therapist A with remainder of task for safety. Pt currently limited due to decreased strength, balance, and activity tolerance impacting safety and independence with self care tasks. UE Function           LUE Strength  L Hand General: 4-/5     LUE Tone: Normotonic     LUE AROM (degrees)  LUE AROM : WFL  LUE General AROM: Increased time with tremors noted     Left Hand AROM (degrees)  Left Hand AROM: WFL  Left Hand General AROM: Increased time with tremors noted  RUE Strength  R Hand General: 4-/5      RUE Tone: Normotonic     RUE AROM (degrees)  RUE AROM : WFL  RUE General AROM: Increased time with tremors noted     Right Hand AROM (degrees)  Right Hand AROM: WFL  Right Hand General AROM: Increased time with tremors noted    Fine Motor Skills  Coordination  Movements Are Fluid And Coordinated: No  Coordination and Movement Description: Fine motor impairments,Gross motor impairments,Tremors,Decreased speed,Decreased accuracy,Right UE,Left UE                           Mobility  Supine to Sit: Stand by assistance  Sit to Supine: Stand by assistance     Balance  Sitting Balance: Stand by assistance  Standing Balance: Minimal assistance  Standing Balance  Time: 1-2 minutes x 3  Activity: functional transfers/mobility, toileting  Comment: with RW. BLE/BUE tremors noted with increased fatigue. Fall risk  Functional Mobility  Functional - Mobility Device: Rolling Walker  Activity: To/from bathroom  Assist Level: Minimal assistance  Functional Mobility Comments: Verbal cues for hand placement and safety. BLE tremors noted with increased A with fatigue.    Bed mobility  Supine to Sit: Stand by assistance  Sit to Supine: Stand by assistance  Scooting: Stand by assistance  Comment: Bed mobility completed with HOB elevated     Transfers  Sit to stand: Minimal assistance  Stand to sit: Contact guard assistance  Transfer Comments: Verbal cues for hand placement and safety. Increased A from toilet height due to fatigue and BLE tremors  Toilet Transfers  Toilet - Technique: Ambulating  Equipment Used: Grab bars  Toilet Transfer: Minimal assistance  Toilet Transfers Comments: Verbal cues for hand placement and safety.  BLE tremors noted        Assessment  Assessment  Performance deficits / Impairments: Decreased ADL status,Decreased functional mobility ,Decreased ROM,Decreased strength,Decreased safe awareness,Decreased endurance,Decreased balance,Decreased high-level IADLs,Decreased coordination  Treatment Diagnosis: Impaired self care status  Prognosis: Fair  Decision Making: Medium Complexity  Discharge Recommendations: Patient would benefit from continued therapy after discharge  Activity Tolerance: Patient limited by fatigue,Patient limited by pain         Functional Outcome Measures  AM-PAC Daily Activity Inpatient   How much help for putting on and taking off regular lower body clothing?: A Lot  How much help for Bathing?: A Lot  How much help for Toileting?: A Lot  How much help for putting on and taking off regular upper body clothing?: A Little  How much help for taking care of personal grooming?: A Little  How much help for eating meals?: A Little  [facility] Inpatient Daily Activity Raw Score: 15  AM-PAC Inpatient ADL T-Scale Score : 34.69  ADL Inpatient CMS 0-100% Score: 56.46  ADL Inpatient CMS G-Code Modifier : CK       Goals  Short Term Goals  Time Frame for Short term goals: By discharge  Short Term Goal 1: Pt will complete lower body dressing/bathing/toileting with Supervision and Good safety   Short Term Goal 2: Pt will complete functional transfers/mobility during self care tasks ih Supervision and Good safety  Short Term Goal 3: Pt will tolerate standing 5+ mintues during functional activity of choice with Supervision and Good safety while maintaining SpO2 above 90%  Short Term Goal 4: Pt will verbalize/demonstrate good understanding of home safety/fall prevention strategies to increase safety and independence with self care and mobility  Short Term Goal 5: Pt will participate in 15+ minutes of therapeutic exercises/functional activities to increase safety and independence with self care and mobility    Plan        Plan  Times per Week: 4-6  Current Treatment Recommendations: Self-Care / ADL,Strengthening,ROM,Balance training,Functional mobility training,Endurance training,Pain management,Safety education & training,Patient/Caregiver education & training,Equipment evaluation, education, & procurement,Home management training       OT Equipment Recommendations  Equipment Needed:  (TBD)  OT Individual Minutes  Time In: 0901  Time Out: 6702  Minutes: 33    Electronically signed by Jhon Muñiz OT on 4/20/22 at 4:40 PM EDT

## 2022-04-20 NOTE — PROGRESS NOTES
Nephrology Progress Note    Subjective/   57y.o. year old female who we are seeing in consultation for end-stage renal disease on hemodialysis    Interval history:  Patient seen and examined today, had dialysis yesterday   Patient denies dizziness, headache shortness of breath at rest or chest pain. Hemoglobin is stable at 10.8 gm/dl. Patient is now on TTS schedule. Chest x-ray was ordered mild CHF    History of Present Illness: This is a 57 y.o. female with hypertension, type 2 diabetes mellitus, end-stage renal disease on hemodialysis   who was recently taken off dialysis about 6 weeks ago due to regaining residual kidney function. Over the course of the last 2 weeks patient  was restarted on hemodialysis due to worsening fluid overload. Initially had problems with her  tunneled catheter which was replaced last week- patient was dialyzed on Saturday, 4/9/2021 and yesterday-4/13/22  at the outpatient dialysis unit. Patient presented with complaints of shortness of breath over the last 2 to 3 days progressively worsening. Patient had associated  Nausea, chest pain but no vomiting. She is on 3 L nasal cannula chronically at home. Patient was found to be in acute DKA with blood sugars greater than 500 was started on the modified DKA protocol. Blood pressures on admission were elevated above 200s and her chest x-ray showed evidence of pulmonary vascular congestion. Labs showed a potassium of 3.3 with BUN/creatinine of 18 and 1.78 mg/dL, bicarbonate of 18 and troponin of 78. Beta hydroxybutyrate was 7.57.   Objective/     Vitals:    04/20/22 0008 04/20/22 0530 04/20/22 0800 04/20/22 1230   BP: (!) 113/53  (!) 115/44 (!) 115/45   Pulse: 65  63 65   Resp: 18 18 18   Temp: 98.2 °F (36.8 °C)  97.9 °F (36.6 °C) 98 °F (36.7 °C)   TempSrc: Axillary  Oral Oral   SpO2: 96%  94% 95%   Weight:  264 lb 12.4 oz (120.1 kg)     Height:         24HR INTAKE/OUTPUT:      Intake/Output Summary (Last 24 hours) at 4/20/2022 1332  Last data filed at 4/20/2022 1012  Gross per 24 hour   Intake 840 ml   Output 200 ml   Net 640 ml     Patient Vitals for the past 96 hrs (Last 3 readings):   Weight   04/20/22 0530 264 lb 12.4 oz (120.1 kg)   04/19/22 1554 264 lb 8.8 oz (120 kg)   04/19/22 1250 267 lb 6.7 oz (121.3 kg)       Constitutional: Alert and oriented x3  Cardiovascular:  S1, S2 without m/r/g  Respiratory: Diminished air entry  Abdomen: +bs, soft, nt  Ext: 2+ LE edema    Data/  Recent Labs     04/18/22  1249 04/19/22  0918 04/20/22  0803   WBC 6.5 6.0 5.4   HGB 9.4* 10.8* 10.5*   HCT 28.6* 32.3* 31.6*   MCV 94.3 94.4 94.2   PLT 94* 122* 122*     Recent Labs     04/18/22  0618 04/18/22  1227 04/19/22  0711 04/20/22  0609     --  136 136   K 3.9  --  4.3 4.3   CL 99  --  98 100   CO2 24  --  23 24   GLUCOSE 93  --  78 125*   PHOS  --  5.3*  --   --    MG  --  1.9  --   --    BUN 26*  --  35* 25*   CREATININE 4.32*  --  4.61* 3.39*   LABGLOM 10*  --  10* 14*   GFRAA 13*  --  12* 17*         Assessment/   1. End-stage renal disease on hemodialysis by way of a right-sided tunneled catheter, transiently taken off dialysis 6 weeks ago and re- started due to worsening fluid overload 1 week ago. Patient still makes urine proximately 400 to 500 cc daily     2. Hypertensive urgency- improving     3. Acute DKA currently on DKA protocol-resolved     4. Hypokalemia #2 to osmotic diuresis and  total body depletion-improved     5. CHF with diastolic dysfunction and evidence of fluid overload pulmonary vascular congestion on chest xray     6.  Urinary tract infection with pyuria     7.   Thrombocytopenia-improving    Plan/   HD TTS   Lasix to 80 mg daily  Basic metabolic panel daily    Pia Cox MD    Nephrologist

## 2022-04-20 NOTE — FLOWSHEET NOTE
04/20/22 1351   Encounter Summary   Encounter Overview/Reason  Volunteer Encounter   Service Provided For: Patient   Referral/Consult From: N[facility]   Last Encounter  04/20/22   Complexity of Encounter Low   Rituals, Rites and Sacraments   Type Judaism Communion   Assessment/Intervention/Outcome   Intervention Prayer (assurance of)/[place]

## 2022-04-21 LAB
ABSOLUTE EOS #: 0.2 K/UL (ref 0–0.4)
ABSOLUTE LYMPH #: 1.3 K/UL (ref 1–4.8)
ABSOLUTE MONO #: 0.4 K/UL (ref 0.1–1.3)
ANION GAP SERPL CALCULATED.3IONS-SCNC: 11 MMOL/L (ref 9–17)
BASOPHILS # BLD: 1 % (ref 0–2)
BASOPHILS ABSOLUTE: 0 K/UL (ref 0–0.2)
BUN BLDV-MCNC: 31 MG/DL (ref 8–23)
CALCIUM SERPL-MCNC: 8.9 MG/DL (ref 8.6–10.4)
CHLORIDE BLD-SCNC: 98 MMOL/L (ref 98–107)
CO2: 25 MMOL/L (ref 20–31)
CREAT SERPL-MCNC: 4.16 MG/DL (ref 0.5–0.9)
EOSINOPHILS RELATIVE PERCENT: 5 % (ref 0–4)
GFR AFRICAN AMERICAN: 13 ML/MIN
GFR NON-AFRICAN AMERICAN: 11 ML/MIN
GFR SERPL CREATININE-BSD FRML MDRD: ABNORMAL ML/MIN/{1.73_M2}
GLUCOSE BLD-MCNC: 151 MG/DL (ref 65–105)
GLUCOSE BLD-MCNC: 86 MG/DL (ref 65–105)
GLUCOSE BLD-MCNC: 89 MG/DL (ref 65–105)
GLUCOSE BLD-MCNC: 91 MG/DL (ref 70–99)
HCT VFR BLD CALC: 28.3 % (ref 36–46)
HEMOGLOBIN: 9.4 G/DL (ref 12–16)
LYMPHOCYTES # BLD: 27 % (ref 24–44)
MCH RBC QN AUTO: 31.2 PG (ref 26–34)
MCHC RBC AUTO-ENTMCNC: 33.1 G/DL (ref 31–37)
MCV RBC AUTO: 94.3 FL (ref 80–100)
MONOCYTES # BLD: 9 % (ref 1–7)
PDW BLD-RTO: 15.8 % (ref 11.5–14.9)
PLATELET # BLD: 118 K/UL (ref 150–450)
PMV BLD AUTO: 9.6 FL (ref 6–12)
POTASSIUM SERPL-SCNC: 4.3 MMOL/L (ref 3.7–5.3)
RBC # BLD: 3 M/UL (ref 4–5.2)
SEG NEUTROPHILS: 58 % (ref 36–66)
SEGMENTED NEUTROPHILS ABSOLUTE COUNT: 2.8 K/UL (ref 1.3–9.1)
SODIUM BLD-SCNC: 134 MMOL/L (ref 135–144)
WBC # BLD: 4.8 K/UL (ref 3.5–11)

## 2022-04-21 PROCEDURE — 85025 COMPLETE CBC W/AUTO DIFF WBC: CPT

## 2022-04-21 PROCEDURE — 80048 BASIC METABOLIC PNL TOTAL CA: CPT

## 2022-04-21 PROCEDURE — 99232 SBSQ HOSP IP/OBS MODERATE 35: CPT | Performed by: FAMILY MEDICINE

## 2022-04-21 PROCEDURE — 99232 SBSQ HOSP IP/OBS MODERATE 35: CPT | Performed by: PHYSICAL MEDICINE & REHABILITATION

## 2022-04-21 PROCEDURE — 82947 ASSAY GLUCOSE BLOOD QUANT: CPT

## 2022-04-21 PROCEDURE — 2060000000 HC ICU INTERMEDIATE R&B

## 2022-04-21 PROCEDURE — 90935 HEMODIALYSIS ONE EVALUATION: CPT

## 2022-04-21 PROCEDURE — 6370000000 HC RX 637 (ALT 250 FOR IP): Performed by: INTERNAL MEDICINE

## 2022-04-21 PROCEDURE — 36415 COLL VENOUS BLD VENIPUNCTURE: CPT

## 2022-04-21 PROCEDURE — 6370000000 HC RX 637 (ALT 250 FOR IP): Performed by: FAMILY MEDICINE

## 2022-04-21 PROCEDURE — 2580000003 HC RX 258: Performed by: STUDENT IN AN ORGANIZED HEALTH CARE EDUCATION/TRAINING PROGRAM

## 2022-04-21 PROCEDURE — 6370000000 HC RX 637 (ALT 250 FOR IP): Performed by: PSYCHIATRY & NEUROLOGY

## 2022-04-21 PROCEDURE — 97110 THERAPEUTIC EXERCISES: CPT

## 2022-04-21 PROCEDURE — 2500000003 HC RX 250 WO HCPCS: Performed by: INTERNAL MEDICINE

## 2022-04-21 PROCEDURE — 97116 GAIT TRAINING THERAPY: CPT

## 2022-04-21 PROCEDURE — 6360000002 HC RX W HCPCS: Performed by: FAMILY MEDICINE

## 2022-04-21 RX ORDER — INSULIN GLARGINE 100 [IU]/ML
50 INJECTION, SOLUTION SUBCUTANEOUS 2 TIMES DAILY
Status: DISCONTINUED | OUTPATIENT
Start: 2022-04-22 | End: 2022-04-28 | Stop reason: HOSPADM

## 2022-04-21 RX ADMIN — Medication 1.6 ML: at 16:40

## 2022-04-21 RX ADMIN — BUSPIRONE HYDROCHLORIDE 7.5 MG: 5 TABLET ORAL at 20:41

## 2022-04-21 RX ADMIN — HEPARIN SODIUM 5000 UNITS: 5000 INJECTION INTRAVENOUS; SUBCUTANEOUS at 22:54

## 2022-04-21 RX ADMIN — ASPIRIN 81 MG: 81 TABLET, COATED ORAL at 09:17

## 2022-04-21 RX ADMIN — SODIUM BICARBONATE 650 MG: 650 TABLET ORAL at 09:18

## 2022-04-21 RX ADMIN — TRAZODONE HYDROCHLORIDE 50 MG: 50 TABLET ORAL at 20:43

## 2022-04-21 RX ADMIN — BUSPIRONE HYDROCHLORIDE 7.5 MG: 5 TABLET ORAL at 09:17

## 2022-04-21 RX ADMIN — SODIUM CHLORIDE, PRESERVATIVE FREE 10 ML: 5 INJECTION INTRAVENOUS at 09:25

## 2022-04-21 RX ADMIN — RANOLAZINE 1000 MG: 1000 TABLET, FILM COATED, EXTENDED RELEASE ORAL at 09:21

## 2022-04-21 RX ADMIN — PANTOPRAZOLE SODIUM 40 MG: 40 TABLET, DELAYED RELEASE ORAL at 12:13

## 2022-04-21 RX ADMIN — INSULIN GLARGINE 60 UNITS: 100 INJECTION, SOLUTION SUBCUTANEOUS at 09:17

## 2022-04-21 RX ADMIN — DOCUSATE SODIUM 100 MG: 100 CAPSULE, LIQUID FILLED ORAL at 09:17

## 2022-04-21 RX ADMIN — SODIUM BICARBONATE 650 MG: 650 TABLET ORAL at 20:43

## 2022-04-21 RX ADMIN — CARVEDILOL 3.12 MG: 3.12 TABLET, FILM COATED ORAL at 20:42

## 2022-04-21 RX ADMIN — FUROSEMIDE 80 MG: 40 TABLET ORAL at 09:17

## 2022-04-21 RX ADMIN — HEPARIN SODIUM 5000 UNITS: 5000 INJECTION INTRAVENOUS; SUBCUTANEOUS at 17:00

## 2022-04-21 RX ADMIN — INSULIN LISPRO 3 UNITS: 100 INJECTION, SOLUTION INTRAVENOUS; SUBCUTANEOUS at 12:12

## 2022-04-21 RX ADMIN — ATORVASTATIN CALCIUM 80 MG: 80 TABLET, FILM COATED ORAL at 09:17

## 2022-04-21 RX ADMIN — Medication 9 MG: at 20:42

## 2022-04-21 RX ADMIN — RANOLAZINE 1000 MG: 1000 TABLET, FILM COATED, EXTENDED RELEASE ORAL at 20:53

## 2022-04-21 RX ADMIN — SODIUM CHLORIDE, PRESERVATIVE FREE 10 ML: 5 INJECTION INTRAVENOUS at 20:41

## 2022-04-21 RX ADMIN — GABAPENTIN 300 MG: 300 CAPSULE ORAL at 09:18

## 2022-04-21 RX ADMIN — GABAPENTIN 300 MG: 300 CAPSULE ORAL at 20:42

## 2022-04-21 RX ADMIN — DOCUSATE SODIUM 100 MG: 100 CAPSULE, LIQUID FILLED ORAL at 20:42

## 2022-04-21 RX ADMIN — ANTI-FUNGAL POWDER MICONAZOLE NITRATE TALC FREE: 1.42 POWDER TOPICAL at 09:21

## 2022-04-21 RX ADMIN — FEBUXOSTAT 40 MG: 40 TABLET, FILM COATED ORAL at 09:21

## 2022-04-21 RX ADMIN — CARVEDILOL 3.12 MG: 3.12 TABLET, FILM COATED ORAL at 09:17

## 2022-04-21 NOTE — PROGRESS NOTES
Nephrology Progress Note    Subjective/   57y.o. year old female who we are seeing in consultation for end-stage renal disease on hemodialysis    Interval history:  Patient seen and examined today, had dialysis yesterday   Patient denies dizziness, headache shortness of breath at rest or chest pain. Hemoglobin is stable at 9.4 gm/dl. Patient is now on TTS schedule. Chest x-ray was ordered mild CHF    History of Present Illness: This is a 57 y.o. female with hypertension, type 2 diabetes mellitus, end-stage renal disease on hemodialysis   who was recently taken off dialysis about 6 weeks ago due to regaining residual kidney function. Over the course of the last 2 weeks patient  was restarted on hemodialysis due to worsening fluid overload. Initially had problems with her  tunneled catheter which was replaced last week- patient was dialyzed on Saturday, 4/9/2021 and yesterday-4/13/22  at the outpatient dialysis unit. Patient presented with complaints of shortness of breath over the last 2 to 3 days progressively worsening. Patient had associated  Nausea, chest pain but no vomiting. She is on 3 L nasal cannula chronically at home. Patient was found to be in acute DKA with blood sugars greater than 500 was started on the modified DKA protocol. Blood pressures on admission were elevated above 200s and her chest x-ray showed evidence of pulmonary vascular congestion. Labs showed a potassium of 3.3 with BUN/creatinine of 18 and 1.78 mg/dL, bicarbonate of 18 and troponin of 78. Beta hydroxybutyrate was 7.57.   Objective/     Vitals:    04/21/22 1400 04/21/22 1430 04/21/22 1500 04/21/22 1533   BP: (!) 115/49 (!) 115/44 (!) 117/48 (!) 115/46   Pulse: 62 61 64 62   Resp: 16 16 18 18   Temp:       TempSrc:       SpO2:       Weight:       Height:         24HR INTAKE/OUTPUT:      Intake/Output Summary (Last 24 hours) at 4/21/2022 1623  Last data filed at 4/21/2022 1119  Gross per 24 hour   Intake 780 ml   Output 350 ml   Net 430 ml     Patient Vitals for the past 96 hrs (Last 3 readings):   Weight   04/21/22 1326 249 lb 1.9 oz (113 kg)   04/21/22 0530 262 lb 5.6 oz (119 kg)   04/20/22 0530 264 lb 12.4 oz (120.1 kg)       Constitutional: Alert and oriented x3  Cardiovascular:  S1, S2 without m/r/g  Respiratory: Diminished air entry  Abdomen: +bs, soft, nt  Ext: 1+ LE edema    Data/  Recent Labs     04/19/22  0918 04/20/22  0803 04/21/22  0531   WBC 6.0 5.4 4.8   HGB 10.8* 10.5* 9.4*   HCT 32.3* 31.6* 28.3*   MCV 94.4 94.2 94.3   * 122* 118*     Recent Labs     04/19/22  0711 04/20/22  0609 04/21/22  0531    136 134*   K 4.3 4.3 4.3   CL 98 100 98   CO2 23 24 25   GLUCOSE 78 125* 91   BUN 35* 25* 31*   CREATININE 4.61* 3.39* 4.16*   LABGLOM 10* 14* 11*   GFRAA 12* 17* 13*         Assessment/   1. End-stage renal disease on hemodialysis by way of a right-sided tunneled catheter, transiently taken off dialysis 6 weeks ago and re- started due to worsening fluid overload 1 week ago. Patient still makes urine proximately 400 to 500 cc daily     2. Hypertensive urgency- improving     3. Acute DKA currently on DKA protocol-resolved     4. Hypokalemia #2 to osmotic diuresis and  total body depletion-improved     5. CHF with diastolic dysfunction and evidence of fluid overload pulmonary vascular congestion on chest xray     6.  Urinary tract infection with pyuria     7.   Thrombocytopenia-improving    Plan/   HD TTS, hemodialysis today as per orders   Lasix to 80 mg daily  Basic metabolic panel daily    MD Mary    Nephrologist

## 2022-04-21 NOTE — PROGRESS NOTES
Physical Therapy  [address] [facility]   Physical Therapy Progress Note    Date: 22  Patient Name: Ranjith Hill       Room: 2990/0811-04  MRN: [id number]   Account: [de-identified]   : 1960  (60 y.o.) Gender: female        Diagnosis: DKA  Past Medical History:  has a past medical history of Backache, unspecified, CHF (congestive heart failure) ([facility] [address] Chronic kidney disease, Coronary atherosclerosis of artery bypass graft, COVID, Cramp of limb, Gallstones, Hypertension, Insomnia, Pneumonia, Type II or unspecified type diabetes mellitus with renal manifestations, not stated as uncontrolled(250.40), Type II or unspecified type diabetes mellitus without mention of complication, not stated as uncontrolled, and Unspecified vitamin D deficiency. Past Surgical History:   has a past surgical history that includes Coronary artery bypass graft; Knee arthroscopy; Carpal tunnel release; Breast surgery; Tonsillectomy; Hand surgery; Ankle fracture surgery; Cholecystectomy, open (N/A); IR TUNNELED CVC PLACE WO SQ PORT/PUMP > 5 YEARS (2021); AV fistula creation (2021); Dialysis fistula creation (Left, 2021); and other surgical history (2022). Additional Pertinent Hx: The patient is a 57 y.o. female who presents to [facility] with complaints of shortness of breath. She has a known hx of CHF, DM2 and ESRD on hemodialysis. She states that the SOB has been progressively worsening over the past few days. SOB was not relieved by Dialysis treatment. Overall Orientation Status: Within Functional Limits  Restrictions/Precautions  Restrictions/Precautions: General Precautions; Fall Risk;Up as Tolerated  Required Braces or Orthoses?: Yes    Subjective: Patient laying in bed upon arrival, agreeable to therapy   Comments: DEDE Aceves approved therapy        Pain Assessment: None - Denies Pain    Bed Mobility:   Bed Mobility  Rolling: Stand by assistance  Supine to Sit: Stand by assistance  Sit to Supine: Stand by assistance  Scooting: Stand by assistance  Bed mobility  Scooting: Stand by assistance    Transfers:  Sit to Stand: Contact guard assistance  Stand to sit: Contact guard assistance  Bed to Chair: Contact guard assistance              Ambulation  Surface: level tile  Device: Rolling Walker  Assistance: Contact guard assistance  Quality of Gait: slight forward flexed posture- able to correct with cueing; steady but slow moving  Gait Deviations: Slow Annie;Decreased step length;Decreased step height  Distance: 10ft x2  Comments: patients B LEs started to have tremors when patioent was amb. back to bed         Stairs/Curb  Stairs?: No    EXERCISES    Other exercises?: Yes  Other exercises 1: bed mobility x2  Other exercises 2: toilet transfer   Other exercises 3: STS x2   Other exercises 4: seated B LE exercises x15 reps            Activity Tolerance: Patient tolerated treatment well,Patient limited by fatigue,Patient limited by endurance  PT Equipment Recommendations  Equipment Needed: No       Current Treatment Recommendations: Strengthening,Balance training,Functional mobility training,Transfer training,Endurance training,Gait training    Conditions Requiring Skilled Therapeutic Intervention  Body Structures, Functions, Activity Limitations Requiring Skilled Therapeutic Intervention: Decreased functional mobility ; Decreased strength;Decreased endurance;Decreased balance;Decreased posture  Treatment Diagnosis: Impaired functional mobility and endurance 2* fatigue and nausea associated with Diabetic Ketoacidosis  History: H/O CVA- was in ARU in Nov,Dec, of 2021 and continued OP Therapy; CHF, Type 2 DM, Hemodialysis on T/TH  Discharge Recommendations: Patient would benefit from continued therapy after discharge    Goals  Short Term Goals  Time Frame for Short term goals: 5 days  Short term goal 1: pt to demo independent bed mobility   Short term goal 2: pt to perform all Transfers with Rollator (or RW), Mod I  Short term goal 3: pt to ambulate household distances of 54-65' with Rollator (or RW) to improve independence and safety with mobility.    Short term goal 4: pt to improve standing balance to good with Use of AD  Short term goal 5: pt to improve standing tolerance to 10 minutes to improve tolerance for daily activities and ADLs       04/21/22 1325   PT Individual Minutes   Time In 0936   Time Out 1009   Minutes 33       Electronically signed by Whitney Grider PTA on 4/21/22 at 1:26 PM EDT

## 2022-04-21 NOTE — ADT AUTH CERT
Diabetes - Care Day 6 (4/18/2022) by Farzana Montoya RN       Review Status Review Entered   Completed 4/19/2022 09:11      Criteria Review      Care Day: 6 Care Date: 4/18/2022 Level of Care: Intermediate Care    Guideline Day 2    Clinical Status    (X) * Hypotension absent    (X) * Mental status at baseline    (X) * Acidosis absent or improved    (X) * Blood glucose under acceptable control or improved    ( ) * Dehydration absent    ( ) * Electrolyte abnormalities absent or improved    ( ) * Renal function at baseline or improved    Activity    ( ) * Ambulatory    Routes    ( ) * Oral hydration    (X) * Oral medications    4/19/2022 9:11 AM EDT by Vasile James      see narrative for all med lists    (X) [place] diet, advance as tolerated    4/19/2022 9:11 AM EDT by Vasile James      Carb Control    Interventions    (X) Serum glucose, serum ketones, chemistries, ABGs or venous pH measurements, urinalysis    4/19/2022 9:11 AM EDT by Vasile James      BMP CBC Gluc Blood gases    (X) Bedside glucose monitoring    4/19/2022 9:11 AM EDT by Vasile James      see narrative    Medications    ( ) * IV insulin absent    ( ) * Outpatient diabetic medication regimen initiated    (X) Subcutaneous insulin    * Milestone   Additional Notes   DATE: 4/18         Pertinent Updates:   Significant event this am   Patient had been working with physical therapy, and felt very dizzy when sitting up at the edge of bed, and became unresponsive. Rapid response was called. I did arrive at the bedside, she was breathing spontaneously, pulse ox 97% on 2.5 L. Heart rate was 70-80 bpm. Blood sugar was reasonable.  She was not responding to any commands, likely had orthostatic hypotensive event.   ++++++++++++++++++++++++++++++++      NN   Dr Mike Bear notified pt is still very lethargic and minimally responsive but arouseable and answer questions slowly.    +++++++++++++++++++++++++++++++++++++         Vitals:         Abnl/Pertinent Labs/Radiology/Diagnostic Studies:      BUN,BUNPL: 26 (H)   Creatinine: 4.32 (H)   GFR Non-: 10 (L)         RBC: 3.04 (L)   Hemoglobin Quant: 9.4 (L)   Hematocrit: 28.6 (L)   Platelet Count: 94 (L)            GLUCOSE, FASTING,GF: 93   POC Glucose: 97   POC Glucose: 136 (H)   POC Glucose: 114 (H)   POC Glucose: 110 (H)            ABG's   pH, Arterial: 7.422   pCO2, Arterial: 40.7   pO2, Arterial: 87.2   HCO3, Arterial: 26.5 (H)   Positive Base Excess, Art: 2.1 (H)   O2 Sat, Arterial: 95.0   Pt Temp: 37.0   Sample Site: Right Radial Artery   O2 Device/Flow/%: 2LNC   RESPIRATORY RATE: 17   +++++++++++++++++++++++++++++++++++++++++++++++++++++++   MD Consults/Assessments & Plans:   Pulmonology   She still feels fairly symptomatic with breathlessness with activity, she states she also gets palpitations during these episodes-still with LE edema      ASSESSMENT:           1. RLL pneumonia/atelectasis with fluid volume excess   2. DKA - resolved   3. HTN urgency   4. UTI- on Ceftriaxone, culture with no signficant growth   5. ESRD on hemodialysis   6. Acute on Chronic diastolic heart failure-Grade 1   7. Chronic hypoxic respiratory insufficiency   8. ERICK (clinical diagnosis)   9. Obesity   PLAN:   1. UTI per primary   2. On home dose of oxygen   3. Likely needs sleep study as an outpatient   4. Increase activity   5. Fluid removal per nephrology, hemodialysis sched for today   6. Repeat CXR in am         ++++++++++++++++++++++++++++++++++++++++++++++++++++         Neuro Consult          IMPRESSION:    This is a 58 y/o WF with multiple medical issues who is admitted mostly for management of CHF, lymphedema and renal failure. She became unresponsive while sitting up. Most likely this was (in part) an orthostatically triggered event. Her blood pressures were a bit on the soft side when recumbent and were certainly lower when she was sitting up.  She is also on flomax in the mornings and this is notorious for causing orthostatic hypotension. She however remains quite somnolent but is improved from when I first saw her. I am not concerned for a primary CNS event. Her current appearance suggests a metabolic encephalopathy/.                       RECOMMENDATIONS:    1. No need for additional neuroimaging at this time   2. Will send of CBC and ammonia   3, Will continue to follow   4. Consider d/c'ing flomax or at least moving to evening dosing   +++++++++++++++++++++++++++++++++++++++++++++      Nephrology   Assessment/   1.  End-stage renal disease on hemodialysis by way of a right-sided tunneled catheter, transiently taken off dialysis 6 weeks ago and re- started due to worsening fluid overload 1 week ago. Patient still makes urine proximately 400 to 500 cc daily       2.  Hypertensive urgency- improving       3.  Acute DKA currently on DKA protocol-resolved       4.  Hypokalemia #2 to osmotic diuresis and  total body depletion-improved       5.  Hypophosphatemia-resolved       6.  CHF with diastolic dysfunction and evidence of fluid overload pulmonary vascular congestion on chest xray       7.  Urinary tract infection with pyuria       8.Period of unresponsiveness-? Syncopal       Plan/   IV calcium gluconate 1 g   IV fluids with 0.9 saline at 50 cc/h for 1 L only   Will assess stability for hemodialysis tomorrow   Hold Flomax   Decrease Lasix to 80 mg daily   Basic metabolic panel daily   Magnesium check    Monitor platelet count due to 50% drop from baseline-          ++++++++++++++++++++++++++++++++++++++++++++++      Neurology 2nd note this al   Again, I suspect that the initial mechanism of her spacing out once she sat up was orthostatic hypotension, and she did wake up when we made her flat however she did continue to be quite altered and sleepy even while having decent recumbent blood pressures of systolic of 692. I think there is another process at work.  Given the reported double vision and right arm numbness, I am wondering if there may be a continued component of vertebrobasilar insufficiency.       Will put on higher dose of ASA and obtain additional MRI and MRA \   +++++++++++++++++++++++++++++++++++++++++++++++++++      Medications:   PO ASA 81mg qd   PO ASA 325mg x1   PO Lipitor 80mg qd   IV Ca Gluc 1000mg x1   PO Coreg 3.125mg q 12h   IV Rocephin 2000mg q 24h   IV Diflucan 200mg q 24h   PO Lasix 80mg q 12h   SQ Heaprin 5000u q 8h   SQ Insulin   PO Na Bicarb 650mg q 8h   PO Flomax 0.4mg qd   IV NS at 50ml/h

## 2022-04-21 NOTE — PROGRESS NOTES
Dialysis Safety Checks:    Patient ID Verified (Y/N) Y     -Hepatitis Test                   Date      Result  Hepatitis B Surface Antigen   3/30/22 Negative     Hepatitis B Surface Antibody 3/30/22 Positive =60     Hepatitis B Core Antibody  3/30/22 Negative     -Treatment Initiation  Blood Vol Processed Goal (Liters)  Pump Speed x Treatment Hours x 60 Minutes    Target Fluid Removal 1000 ml     * Intra-treatment documented Safety Checks include the followin) Access and face visible at all times. 2) All connections and blood lines are secure with no kinks. 3) NVL alarm engaged. 4) Hemosafe device applied (if applicable). 5) No collapse of Arterial or Venous blood chambers. 6) All blood lines / pump segments in the air detectors.   --------------------------------------------------------------------------------  Report received from Crow Bishop

## 2022-04-21 NOTE — PROGRESS NOTES
Spoke with Sarah Marinelli, who states precert can be initiated. Writer initiated precert for [facility] with Joseph via fax.

## 2022-04-21 NOTE — PROGRESS NOTES
FAMILY MEDICINE  - PROGRESS NOTE    Date:  4/21/2022  Abiola Dangelo  [id number]      Chief Complaint   Patient presents with    Shortness of Breath         Interval History:  Improved, she is feeling a little better and her vitals have improved. No significant events overnight. Blood sugars are still dropping low, will decrease her insulin again. No significant events overnight. Specialists notes, labs & imaging reviewed.       Subjective  Constitutional: positive for obesity  Respiratory: positive for shortness of breath  Cardiovascular: positive for lower extremity edema  Musculoskeletal:positive for arthralgias and myalgias  Behavioral/Psych: positive for anxiety and depression  Endocrine: positive for diabetic symptoms including hyperglycemia:    Objective:    BP (!) 112/54   Pulse 61   Temp 98.4 °F (36.9 °C) (Oral)   Resp 18   Ht 5' 5\" (1.651 m)   Wt 262 lb 5.6 oz (119 kg)   SpO2 97%   BMI 43.66 kg/m²   General appearance - alert, well appearing, and in no distress and overweight  Mental status - alert, oriented to person, place, and time  Eyes - pupils equal and reactive, extraocular eye movements intact  Ears - hearing grossly normal bilaterally  Nose - normal and patent, no erythema, discharge or polyps  Mouth - mucous membranes moist, pharynx normal without lesions  Neck - supple, no significant adenopathy  Lymphatics - no palpable lymphadenopathy, no hepatosplenomegaly  Chest - clear to auscultation, no wheezes, rales or rhonchi, symmetric air entry, decreased air entry noted posteriorly  Heart - normal rate, regular rhythm, normal S1, S2, no murmurs, rubs, clicks or gallops  Abdomen - soft, nontender, nondistended, no masses or organomegaly  Breasts - not examined  Back exam - not examined  Neurological - alert, oriented, normal speech, no focal findings or movement disorder noted  Musculoskeletal - no joint tenderness, deformity or swelling  Extremities - pedal edema 1 +  Skin - normal coloration and turgor, no rashes, no suspicious skin lesions noted    Data:   Medications:   Current Facility-Administered Medications   Medication Dose Route Frequency Provider Last Rate Last Admin    insulin lispro (HUMALOG) injection vial 0-18 Units  0-18 Units SubCUTAneous TID WC Dylan Ortiz MD   6 Units at 04/20/22 1750    insulin lispro (HUMALOG) injection vial 0-9 Units  0-9 Units SubCUTAneous Nightly Dylan Ortiz MD   5 Units at 04/20/22 2136    calcium carbonate (TUMS) chewable tablet 500 mg  500 mg Oral TID PRN Dylan Ortiz MD   500 mg at 04/20/22 1751    furosemide (LASIX) tablet 80 mg  80 mg Oral Daily Thornell Lory Waddell Spatz, MD   80 mg at 04/20/22 1006    insulin glargine (LANTUS) injection vial 60 Units  60 Units SubCUTAneous BID Dylan Ortiz MD   60 Units at 04/20/22 2136    polyethylene glycol (GLYCOLAX) packet 17 g  17 g Oral Daily PRN Natalya Roman MD        aspirin EC tablet 81 mg  81 mg Oral Daily Gerhard Klein MD   81 mg at 04/20/22 1007    sodium chloride flush 0.9 % injection 5-40 mL  5-40 mL IntraVENous 2 times per day Lauren Rued, DO   10 mL at 04/20/22 1010    sodium chloride flush 0.9 % injection 5-40 mL  5-40 mL IntraVENous PRN Lauren Rued, DO        0.9 % sodium chloride infusion   IntraVENous PRN Lauren Rued, DO 25 mL/hr at 04/20/22 1212 25 mL at 04/20/22 1212    ondansetron (ZOFRAN-ODT) disintegrating tablet 4 mg  4 mg Oral Q8H PRN Lauren Rued, DO        Or    ondansetron [facility]) injection 4 mg  4 mg IntraVENous Q6H PRN Lauren Rued, DO        ranolazine [facility]) extended release tablet 1,000 mg  1,000 mg Oral BID Dylan Ortiz MD   1,000 mg at 04/20/22 2146    busPIRone (BUSPAR) tablet 7.5 mg  7.5 mg Oral TID Dylan Ortiz MD   7.5 mg at 04/20/22 2131    pantoprazole (PROTONIX) tablet 40 mg  40 mg Oral QAM AC Dylan Ortiz MD   40 mg at 04/20/22 5599    atorvastatin (LIPITOR) tablet 80 mg  80 mg Oral Daily Thao Louie MD   80 mg at 04/20/22 1006    febuxostat (ULORIC) tablet 40 mg  40 mg Oral Daily Thao Louie MD   40 mg at 04/20/22 1009    docusate sodium (COLACE) capsule 100 mg  100 mg Oral BID Thao Louie MD   100 mg at 04/20/22 2140    [Held by provider] tamsulosin (FLOMAX) capsule 0.4 mg  0.4 mg Oral Daily Thao Louie MD   0.4 mg at 04/18/22 0815    [Held by provider] insulin lispro (HUMALOG) injection vial 15 Units  15 Units SubCUTAneous TID AC Thao Louie MD   15 Units at 04/16/22 1713    sodium bicarbonate tablet 650 mg  650 mg Oral TID Thao Louie MD   650 mg at 04/20/22 2139    traZODone (DESYREL) tablet 50 mg  50 mg Oral Nightly Thao Louie MD   50 mg at 04/20/22 2240    gabapentin (NEURONTIN) capsule 300 mg  300 mg Oral BID Thao Louie MD   300 mg at 04/20/22 2139    melatonin tablet 9 mg  9 mg Oral Nightly Thao Louie MD   9 mg at 04/20/22 2241    lidocaine-prilocaine (EMLA) cream   Topical PRN Thao Louie MD        carvedilol (COREG) tablet 3.125 mg  3.125 mg Oral BID Taho Louie MD   3.125 mg at 04/20/22 2139    heparin (porcine) injection 5,000 Units  5,000 Units SubCUTAneous 3 times per day Thao Louie MD   5,000 Units at 04/20/22 2138    sodium phosphate 10 mmol in dextrose 5 % 250 mL IVPB  10 mmol IntraVENous Once Dell Hunter MD        anticoagulant sodium citrate 4 % injection 1.6 mL  1.6 mL IntraCATHeter PRN Dell Hunter MD   1.6 mL at 04/19/22 1600    anticoagulant sodium citrate 4 % injection 1.6 mL  1.6 mL IntraCATHeter PRN Dell Hunter MD   1.6 mL at 04/19/22 1601    glucose (GLUTOSE) 40 % oral gel 15 g  15 g Oral PRN Thao Louie MD        dextrose 50 % IV solution  12.5 g IntraVENous PRN Thao Louie MD        glucagon (rDNA) injection 1 mg  1 mg IntraMUSCular PRN Thao Louie MD        miconazole (MICOTIN) 2 % powder   Topical BID Genesis Bowens MD   Given at 04/20/22 2141    acetaminophen (TYLENOL) tablet 650 mg  650 mg Oral Q4H PRN Aleatha Blizzard, DO   650 mg at 04/20/22 1751       Intake/Output Summary (Last 24 hours) at 4/21/2022 0614  Last data filed at 4/20/2022 1928  Gross per 24 hour   Intake 1020 ml   Output 500 ml   Net 520 ml     Recent Results (from the past 24 hour(s))   POC Glucose Fingerstick    Collection Time: 04/20/22  7:31 AM   Result Value Ref Range    POC Glucose 113 (H) 65 - 105 mg/dL   CBC with Auto Differential    Collection Time: 04/20/22  8:03 AM   Result Value Ref Range    WBC 5.4 3.5 - 11.0 k/uL    RBC 3.36 (L) 4.0 - 5.2 m/uL    Hemoglobin 10.5 (L) 12.0 - 16.0 g/dL    Hematocrit 31.6 (L) 36 - 46 %    MCV 94.2 80 - 100 fL    MCH 31.2 26 - 34 pg    MCHC 33.2 31 - 37 g/dL    RDW 15.7 (H) 11.5 - 14.9 %    Platelets 932 (L) 090 - 450 k/uL    MPV 10.9 6.0 - 12.0 fL    Seg Neutrophils 63 36 - 66 %    Lymphocytes 22 (L) 24 - 44 %    Monocytes 9 (H) 1 - 7 %    Eosinophils % 5 (H) 0 - 4 %    Basophils 1 0 - 2 %    Segs Absolute 3.40 1.3 - 9.1 k/uL    Absolute Lymph # 1.20 1.0 - 4.8 k/uL    Absolute Mono # 0.50 0.1 - 1.3 k/uL    Absolute Eos # 0.30 0.0 - 0.4 k/uL    Basophils Absolute 0.00 0.0 - 0.2 k/uL   POC Glucose Fingerstick    Collection Time: 04/20/22 11:09 AM   Result Value Ref Range    POC Glucose 205 (H) 65 - 105 mg/dL   POC Glucose Fingerstick    Collection Time: 04/20/22  4:19 PM   Result Value Ref Range    POC Glucose 210 (H) 65 - 105 mg/dL   POC Glucose Fingerstick    Collection Time: 04/20/22  7:25 PM   Result Value Ref Range    POC Glucose 296 (H) 65 - 105 mg/dL   CBC with Auto Differential    Collection Time: 04/21/22  5:31 AM   Result Value Ref Range    WBC 4.8 3.5 - 11.0 k/uL    RBC 3.00 (L) 4.0 - 5.2 m/uL    Hemoglobin 9.4 (L) 12.0 - 16.0 g/dL    Hematocrit 28.3 (L) 36 - 46 %    MCV 94.3 80 - 100 fL    MCH 31.2 26 - 34 pg    MCHC 33.1 31 - 37 g/dL    RDW 15.8 (H) 11.5 - 14.9 % Platelets 829 (L) 474 - 450 k/uL    MPV 9.6 6.0 - 12.0 fL    Seg Neutrophils 58 36 - 66 %    Lymphocytes 27 24 - 44 %    Monocytes 9 (H) 1 - 7 %    Eosinophils % 5 (H) 0 - 4 %    Basophils 1 0 - 2 %    Segs Absolute 2.80 1.3 - 9.1 k/uL    Absolute Lymph # 1.30 1.0 - 4.8 k/uL    Absolute Mono # 0.40 0.1 - 1.3 k/uL    Absolute Eos # 0.20 0.0 - 0.4 k/uL    Basophils Absolute 0.00 0.0 - 0.2 k/uL   Basic Metabolic Panel    Collection Time: 04/21/22  5:31 AM   Result Value Ref Range    Glucose 91 70 - 99 mg/dL    BUN 31 (H) 8 - 23 mg/dL    CREATININE 4.16 (H) 0.50 - 0.90 mg/dL    Calcium 8.9 8.6 - 10.4 mg/dL    Sodium 134 (L) 135 - 144 mmol/L    Potassium 4.3 3.7 - 5.3 mmol/L    Chloride 98 98 - 107 mmol/L    CO2 25 20 - 31 mmol/L    Anion Gap 11 9 - 17 mmol/L    GFR Non-African American 11 (L) >60 mL/min    GFR  13 (L) >60 mL/min    GFR Comment           -----------------------------------------------------------------  RAD:  EKG:  Micro:     Assessment & Plan:    Patient Active Problem List:     Atherosclerosis of coronary artery bypass graft of native heart without angina pectoris     Chronic diastolic heart failure (HCC)     Diabetic polyneuropathy associated with type 2 diabetes mellitus ([facility])     History of coronary artery bypass graft     Iron deficiency anemia     Spinal stenosis of lumbar region with neurogenic claudication     Mixed hyperlipidemia     Type 2 diabetes mellitus with kidney complication, with long-term current use of insulin ([facility])     Thyroid nodule greater than or equal to 1 cm in diameter incidentally noted on imaging study     Essential hypertension     Chronic ischemic heart disease     Ischemic stroke of frontal lobe ([facility])     Morbid obesity with BMI of 40.0-44.9, adult ([facility])     Disequilibrium syndrome     Generalized weakness     Long-term memory loss     Muscle right arm weakness     Anxiety     Chronic midline low back pain with bilateral sciatica     Tremor     COVID End-stage renal disease ([facility] [address]     DKA, type 2, not at goal [facility])     Hypokalemia           Plan:  -DKA - resolved, hypoglycemic episodes continue, regular Lantus dose decreased to 50 units BID, SS coverage and carb control diet continued.  -Chronic hypoxic respiratory failure - on continuous oxygen per NC, Pulmonology managing. -ESRD on dialysis - Nephrology managing.  -Acute on chronic CHF exacerbation - volume status being managed by Nephrology. -UTI - on Rocephin. -TIA like symptoms - MRI & MRA negative, Neurology following. -RLL infiltrate -repeat CXR shows mild congestion.  -Generalized weakness - PT/OT treating. -D/C planning ongoing - PM&R evaluating for possible transfer to ARU . -Complete orders per chart.     See orders   Disposition:    Electronically signed by Ilda Hooper MD on 4/21/2022 at 6:14 AM

## 2022-04-21 NOTE — PLAN OF CARE
Safety maintained, call light is within reach, no s/s or c/o distress, bed is low/locked, side rails are up x2,bed alarm remains on  Problem: Falls - Risk of:  Goal: Will remain free from falls  Description: Will remain free from falls  4/21/2022 0413 by Laila Pina RN  Outcome: Progressing  4/20/2022 1651 by Peggy Nj RN  Outcome: Progressing  Goal: Absence of physical injury  Description: Absence of physical injury  4/21/2022 0413 by Laila Pina RN  Outcome: Progressing  4/20/2022 1651 by Peggy Nj RN  Outcome: Progressing     Problem: Skin Integrity:  Goal: Will show no infection signs and symptoms  Description: Will show no infection signs and symptoms  4/21/2022 0413 by Laila Pina RN  Outcome: Progressing  4/20/2022 1651 by Peggy Nj RN  Outcome: Progressing  Goal: Absence of new skin breakdown  Description: Absence of new skin breakdown  4/21/2022 0413 by Laila Pina RN  Outcome: Progressing  4/20/2022 1651 by Peggy Nj RN  Outcome: Progressing     Problem: Musculor/Skeletal Functional Status  Goal: Highest potential functional level  4/21/2022 0413 by Laila Pina RN  Outcome: Progressing  4/20/2022 1651 by Peggy Nj RN  Outcome: Progressing  Goal: Absence of falls  4/21/2022 0413 by Laila Pina RN  Outcome: Progressing  4/20/2022 1651 by Peggy Nj RN  Outcome: Progressing     Problem: Pain:  Goal: Pain level will decrease  Description: Pain level will decrease  4/21/2022 0413 by Laila Pina RN  Outcome: Progressing  4/20/2022 1651 by Peggy Nj RN  Outcome: Progressing  Goal: Control of acute pain  Description: Control of acute pain  4/21/2022 0413 by Laila Pina RN  Outcome: Progressing  4/20/2022 1651 by Peggy Nj RN  Outcome: Progressing  Goal: Control of chronic pain  Description: Control of chronic pain  4/21/2022 0413 by Laila Pina RN  Outcome: Progressing  4/20/2022 1651 by Eliezer Rees RN  Outcome: Progressing     Problem: Discharge Planning  Goal: Discharge to home or other facility with appropriate resources  4/21/2022 0413 by Celso Orozco RN  Outcome: Progressing  4/20/2022 1651 by Eliezer Rees RN  Outcome: Progressing     Problem: Chronic Conditions and Co-morbidities  Goal: Patient's chronic conditions and co-morbidity symptoms are monitored and maintained or improved  4/21/2022 0413 by Celso Orozco RN  Outcome: Progressing  4/20/2022 1651 by Eliezer Rees RN  Outcome: Progressing family

## 2022-04-21 NOTE — PROGRESS NOTES
Jason Bhatt MD/Carter Landa MD/ Mary Jane Cook MD/Jordan Goff APRN AGAJOSE LUISP-BC, NP-C      Elder Aschoff APRN NP-C     Latonyalila Westons APRN NP-C                                           Pulmonary Progress Note    Patient - Freeman Montes De Oca   Age - 57 y.o.   - 1960  MRN - [id number]  Acct # - [de-identified]  Date of Admission - 2022  3:17 PM    Consulting Service/Physician:       Primary Care Physician: Carlito Smith, APRN - CNP    SUBJECTIVE:     Chief Complaint:   Chief Complaint   Patient presents with    Shortness of Breath     Subjective:    She has not had any other issues, she states breathing has improved overall, denies any problematic cough, agreed to acute rehab, awaiting pre cert. No acute issues overnight. VITALS  BP (!) 109/49   Pulse 64   Temp 97.7 °F (36.5 °C) (Oral)   Resp 16   Ht 5' 5\" (1.651 m)   Wt 262 lb 5.6 oz (119 kg)   SpO2 97%   BMI 43.66 kg/m²   Wt Readings from Last 3 Encounters:   22 262 lb 5.6 oz (119 kg)   22 248 lb (112.5 kg)   22 250 lb (113.4 kg)     I/O (24 Hours)    Intake/Output Summary (Last 24 hours) at 2022 0931  Last data filed at 2022 1928  Gross per 24 hour   Intake 780 ml   Output 500 ml   Net 280 ml     Ventilator:      Exam:   Physical Exam   Constitutional:  Oriented to person, place, and time. Appears well-developed and well-nourished. Sitting up in bed no apparent distress  HENT: Unremarkable, nasal cannula in place  Head: Normocephalic and atraumatic. Eyes: EOM are normal. Pupils are equal, round, and reactive to light. Neck: Neck supple. Cardiovascular:  Regular rate and rhythm. Normal heart tones. No JVD. Pulmonary/Chest: Effort normal and breath sounds diminished due to body habitus, respirations easy and nonlabored on nasal cannula  Abdominal: Obese Soft. Bowel sounds are normal.   Musculoskeletal: Normal range of motion. Very weak  Neurological:patient is alert and oriented to person, place, and time. Skin: Skin is warm and dry.    Extremities: Bilateral lower extremity trace edema, improved  Infusions:      sodium chloride 25 mL (04/20/22 1212)     Meds:     Current Facility-Administered Medications:     insulin lispro (HUMALOG) injection vial 0-18 Units, 0-18 Units, SubCUTAneous, TID WC, Tamar Lyons MD, 6 Units at 04/20/22 1750    insulin lispro (HUMALOG) injection vial 0-9 Units, 0-9 Units, SubCUTAneous, Nightly, Tamar Lyons MD, 5 Units at 04/20/22 2136    calcium carbonate (TUMS) chewable tablet 500 mg, 500 mg, Oral, TID PRN, Tamar Lyons MD, 500 mg at 04/20/22 1751    furosemide (LASIX) tablet 80 mg, 80 mg, Oral, Daily, Kiersten Geronimo MD, 80 mg at 04/21/22 0917    insulin glargine (LANTUS) injection vial 60 Units, 60 Units, SubCUTAneous, BID, Tamar Lyons MD, 60 Units at 04/21/22 1148    polyethylene glycol (GLYCOLAX) packet 17 g, 17 g, Oral, Daily PRN, Rl Gipson MD    aspirin EC tablet 81 mg, 81 mg, Oral, Daily, Felice Price MD, 81 mg at 04/21/22 4151    sodium chloride flush 0.9 % injection 5-40 mL, 5-40 mL, IntraVENous, 2 times per day, Geoffrey Garcia DO, 10 mL at 04/21/22 0925    sodium chloride flush 0.9 % injection 5-40 mL, 5-40 mL, IntraVENous, PRN, Geoffrey Garcia DO    0.9 % sodium chloride infusion, , IntraVENous, PRN, Geoffrey Garcia DO, Last Rate: 25 mL/hr at 04/20/22 1212, 25 mL at 04/20/22 1212    ondansetron (ZOFRAN-ODT) disintegrating tablet 4 mg, 4 mg, Oral, Q8H PRN **OR** ondansetron (ZOFRAN) injection 4 mg, 4 mg, IntraVENous, Q6H PRN, Geoffrey Garcia DO    ranolazine [facility]) extended release tablet 1,000 mg, 1,000 mg, Oral, BID, Tamar Lyons MD, 1,000 mg at 04/21/22 8217    busPIRone (BUSPAR) tablet 7.5 mg, 7.5 mg, Oral, TID, Tamar Lyons MD, 7.5 mg at 04/21/22 0917    pantoprazole (PROTONIX) tablet 40 mg, 40 mg, Oral, Niesha CHEN Adrianna Rome MD, 40 mg at 04/20/22 8431    atorvastatin (LIPITOR) tablet 80 mg, 80 mg, Oral, Daily, Elias Salcedo MD, 80 mg at 04/21/22 0917    febuxostat (ULORIC) tablet 40 mg, 40 mg, Oral, Daily, Elias Salcedo MD, 40 mg at 04/21/22 2929    docusate sodium (COLACE) capsule 100 mg, 100 mg, Oral, BID, Elias Salcedo MD, 100 mg at 04/21/22 0917    [Held by provider] tamsulosin (FLOMAX) capsule 0.4 mg, 0.4 mg, Oral, Daily, Elias Salcedo MD, 0.4 mg at 04/18/22 0815    [Held by provider] insulin lispro (HUMALOG) injection vial 15 Units, 15 Units, SubCUTAneous, TID AC, Elias Salcedo MD, 15 Units at 04/16/22 1713    sodium bicarbonate tablet 650 mg, 650 mg, Oral, TID, Elias Salcedo MD, 650 mg at 04/21/22 0918    traZODone (DESYREL) tablet 50 mg, 50 mg, Oral, Nightly, Eilas Salcedo MD, 50 mg at 04/20/22 2240    gabapentin (NEURONTIN) capsule 300 mg, 300 mg, Oral, BID, Elias Salcedo MD, 300 mg at 04/21/22 5561    melatonin tablet 9 mg, 9 mg, Oral, Nightly, Elias Salcedo MD, 9 mg at 04/20/22 2241    lidocaine-prilocaine (EMLA) cream, , Topical, PRN, Elias Salcedo MD    carvedilol (COREG) tablet 3.125 mg, 3.125 mg, Oral, BID, Elias Salcedo MD, 3.125 mg at 04/21/22 3682    heparin (porcine) injection 5,000 Units, 5,000 Units, SubCUTAneous, 3 times per day, Elias Salcedo MD, 5,000 Units at 04/20/22 2138    sodium phosphate 10 mmol in dextrose 5 % 250 mL IVPB, 10 mmol, IntraVENous, Once, Rick Gonzalez MD    anticoagulant sodium citrate 4 % injection 1.6 mL, 1.6 mL, IntraCATHeter, PRN, Rick Gonzalez MD, 1.6 mL at 04/19/22 1600    anticoagulant sodium citrate 4 % injection 1.6 mL, 1.6 mL, IntraCATHeter, PRN, Rick Gonzalez MD, 1.6 mL at 04/19/22 1601    glucose (GLUTOSE) 40 % oral gel 15 g, 15 g, Oral, PRN, Elias Salcedo MD    dextrose 50 % IV solution, 12.5 g, IntraVENous, PRN, Elias Salcedo MD    glucagon (rDNA) injection 1 mg, 1 mg, IntraMUSCular, PRN, Robert Mas MD    miconazole (MICOTIN) 2 % powder, , Topical, BID, Robert Mas MD, Given at 04/21/22 3148    acetaminophen (TYLENOL) tablet 650 mg, 650 mg, Oral, Q4H PRN, Leland Fuller DO, 650 mg at 04/20/22 1751    Lab Results:     Lab Results   Component Value Date    WBC 4.8 04/21/2022    HGB 9.4 (L) 04/21/2022    HCT 28.3 (L) 04/21/2022    MCV 94.3 04/21/2022     (L) 04/21/2022     Lab Results   Component Value Date    CALCIUM 8.9 04/21/2022     (L) 04/21/2022    K 4.3 04/21/2022    CO2 25 04/21/2022    CL 98 04/21/2022    BUN 31 (H) 04/21/2022    CREATININE 4.16 (H) 04/21/2022       Lab Results   Component Value Date    INR 0.9 11/10/2021    PROTIME 12.7 11/10/2021     2D echo 4/13/22  Left ventricle is normal in size. Moderate left ventricular hypertrophy. Global left ventricular systolic function is normal. Estimated LV EF >55 %. No obvious wall motion abnormality seen. Evidence of mild (grade I)  diastolic dysfunction. Left atrium is normal in size. Mild tricuspid regurgitation. No pulmonary hypertension. Estimated right ventricular systolic pressure is  [id number]. Radiology:       4/19/22                                                                                  4/15/22  Chest x-ray continues show some improvement, with vascular congestion still some congestion noted to the left lower lobe  ASSESSMENT:       1. RLL pneumonia/atelectasis versus fluid volume excess  2. DKA - resolved  3. HTN urgency  4. UTI- on Ceftriaxone, culture with no signficant growth  5. ESRD on hemodialysis  6. Acute on Chronic diastolic heart failure-Grade 1  7. Syncopal episode yesterday thought to be related to orthostatic hypotension  8. Chronic hypoxic respiratory insufficiency-on 2 L  9. ERICK (clinical diagnosis)  10. Obesity  PLAN:   1. Completed abx for UTI  2. On home dose of oxygen  3. Likely needs sleep study as an outpatient  4.  Can f/u in our office in 4 weeks, she did call our office and we do  Not take her insurance, encouraged her to follow up with pulm  5. No objection to discharge to acute rehab, we can follow with her there as needed  6. D/w charge RN      Electronically signed by AFSANEH Thakur CNP on 04/21/22     This progress note was completed using a voice transcription system. Every effort was made to ensure accuracy. However, inadvertent computerized transcription errors may be present.     SACHA SHELBY AGACNP-BC, NP-C  [facility] Pulmonary, Critical Care & Sleep

## 2022-04-21 NOTE — CARE COORDINATION
Pre-cert was started for [facility] on this date, 4/21/2022. Jennifer requesting clarification if pt will start on Eliquis or if they will continue on Heprin.

## 2022-04-21 NOTE — PROGRESS NOTES
Physical Medicine & Rehabilitation  Progress Note    4/21/2022 4:02 PM     CC: Ambulatory and ADL dysfunction due to deconditioning due to pneumonia/DKA/transfer insufficiency    Subjective:   Feels well. No complaints today    ROS:  Denies fevers, chills, sweats. No chest pain, palpitations, lightheadedness. Denies coughing, wheezing or shortness of breath. Denies abdominal pain, nausea, diarrhea or constipation. No new areas of joint pain. Denies new areas of numbness or weakness. Denies new anxiety or depression issues. No new skin problems. Rehabilitation:   PT:  Restrictions/Precautions: General Precautions,Fall Risk,Up as Tolerated   Transfers  Sit to Stand: Contact guard assistance  Stand to sit: Contact guard assistance  Bed to Chair: Contact guard assistance  Stand Pivot Transfers: Contact guard assistance  Comment: deferreed this date d/t RR called   Ambulation  Surface: level tile  Device: Rolling Walker  Assistance: Contact guard assistance  Quality of Gait: slight forward flexed posture- able to correct with cueing; steady but slow moving  Gait Deviations: Slow Annie,Decreased step length,Decreased step height  Distance: 10ft x2  Comments: patients B LEs started to have tremors when patioent was amb. back to bed           OT:  ADL  Feeding: Setup  Grooming: Setup  UE Bathing: Stand by assistance  LE Bathing: Moderate assistance  UE Dressing: Stand by assistance  LE Dressing: Moderate assistance  Toileting: Moderate assistance  Toileting Skilled Clinical Factors: A with standing balance and hygiene due to tremors in BLE and BUE  Additional Comments: ADL scores based on clinical reasoning and skilled observation unless otherwise noted. OT facilitated pts engagement in toileting task. Pt demonstrated tremors in BLE and BUE during hygiene requiring a seated rest break. Therapist A with remainder of task for safety.  Pt currently limited due to decreased strength, balance, and activity tolerance impacting safety and independence with self care tasks. Balance  Sitting Balance: Stand by assistance  Standing Balance: Minimal assistance   Standing Balance  Time: 1-2 minutes x 3  Activity: functional transfers/mobility, toileting  Comment: with RW. BLE/BUE tremors noted with increased fatigue. Fall risk  Functional Mobility  Functional - Mobility Device: Rolling Walker  Activity: To/from bathroom  Assist Level: Minimal assistance  Functional Mobility Comments: Verbal cues for hand placement and safety. BLE tremors noted with increased A with fatigue. Bed mobility  Bridging: Independent  Supine to Sit: Stand by assistance  Sit to Supine: Stand by assistance  Scooting: Stand by assistance  Comment: Bed mobility completed with HOB elevated  Transfers  Sit to stand: Minimal assistance  Stand to sit: Contact guard assistance  Transfer Comments: Verbal cues for hand placement and safety. Increased A from toilet height due to fatigue and BLE tremors   Toilet Transfers  Toilet - Technique: Ambulating  Equipment Used: Grab bars  Toilet Transfer: Minimal assistance  Toilet Transfers Comments: Verbal cues for hand placement and safety. BLE tremors noted               ST:            Objective:  BP (!) 115/46   Pulse 62   Temp 97.9 °F (36.6 °C) (Oral)   Resp 18   Ht 5' 5\" (1.651 m)   Wt 249 lb 1.9 oz (113 kg)   SpO2 97%   BMI 41.46 kg/m²  I Body mass index is 41.46 kg/m². I   Wt Readings from Last 1 Encounters:   22 249 lb 1.9 oz (113 kg)      Temp (24hrs), Av.1 °F (36.7 °C), Min:97.7 °F (36.5 °C), Max:98.4 °F (36.9 °C)         GEN: well developed, well nourished, no acute distress  HEENT: Normocephalic atraumatic, EOMI, mucous membranes pink and moist  CV: RRR, no murmurs, rubs or gallops  PULM: CTAB, no rales or rhonchi. Respirations WNL and unlabored  ABD: soft, NT, ND, +BS and equal  NEURO: A&O x3. Sensation intact to light touch.    MSK: 4/5 upper extremity, at least antigravity lower extremity  EXTREMITIES: No calf tenderness to palpation bilaterally. No edema BLEs  SKIN: warm dry and intact with good turgor  PSYCH: appropriately interactive. Affect WNL.         Medications   Scheduled Meds:   insulin lispro  0-18 Units SubCUTAneous TID WC    insulin lispro  0-9 Units SubCUTAneous Nightly    furosemide  80 mg Oral Daily    insulin glargine  60 Units SubCUTAneous BID    aspirin  81 mg Oral Daily    sodium chloride flush  5-40 mL IntraVENous 2 times per day    ranolazine  1,000 mg Oral BID    busPIRone  7.5 mg Oral TID    pantoprazole  40 mg Oral QAM AC    atorvastatin  80 mg Oral Daily    febuxostat  40 mg Oral Daily    docusate sodium  100 mg Oral BID    [Held by provider] tamsulosin  0.4 mg Oral Daily    [Held by provider] insulin lispro  15 Units SubCUTAneous TID AC    sodium bicarbonate  650 mg Oral TID    traZODone  50 mg Oral Nightly    gabapentin  300 mg Oral BID    melatonin  9 mg Oral Nightly    carvedilol  3.125 mg Oral BID    heparin (porcine)  5,000 Units SubCUTAneous 3 times per day    sodium phosphate IVPB  10 mmol IntraVENous Once    miconazole   Topical BID     Continuous Infusions:   sodium chloride 25 mL (04/20/22 1212)     PRN Meds:.calcium carbonate, polyethylene glycol, sodium chloride flush, sodium chloride, ondansetron **OR** ondansetron, lidocaine-prilocaine, anticoagulant sodium citrate, anticoagulant sodium citrate, glucose, dextrose, glucagon (rDNA), acetaminophen     Diagnostics:     CBC:   Recent Labs     04/19/22  0918 04/20/22  0803 04/21/22  0531   WBC 6.0 5.4 4.8   RBC 3.42* 3.36* 3.00*   HGB 10.8* 10.5* 9.4*   HCT 32.3* 31.6* 28.3*   MCV 94.4 94.2 94.3   RDW 15.7* 15.7* 15.8*   * 122* 118*     BMP:   Recent Labs     04/19/22  0711 04/20/22  0609 04/21/22  0531    136 134*   K 4.3 4.3 4.3   CL 98 100 98   CO2 23 24 25   BUN 35* 25* 31*   CREATININE 4.61* 3.39* 4.16*     BNP: No results for input(s): BNP in the last 72 hours.  PT/INR: No results for input(s): PROTIME, INR in the last 72 hours. APTT: No results for input(s): APTT in the last 72 hours. CARDIAC ENZYMES: No results for input(s): CKMB, CKMBINDEX, TROPONINT in the last 72 hours. Invalid input(s): CKTOTAL;3  FASTING LIPID PANEL:  Lab Results   Component Value Date    CHOL 105 04/09/2015    HDL 43 04/09/2015    TRIG 168 04/09/2015     LIVER PROFILE: No results for input(s): AST, ALT, ALB, BILIDIR, BILITOT, ALKPHOS in the last 72 hours. I/O (24Hr): Intake/Output Summary (Last 24 hours) at 4/21/2022 1602  Last data filed at 4/21/2022 1119  Gross per 24 hour   Intake 780 ml   Output 350 ml   Net 430 ml       Glu last 24 hour  Recent Labs     04/20/22  1109 04/20/22  1619 04/20/22  1925 04/21/22  1126   POCGLU 205* 210* 296* 151*       No results for input(s): CLARITYU, COLORU, PHUR, SPECGRAV, PROTEINU, RBCUA, BLOODU, BACTERIA, NITRU, WBCUA, LEUKOCYTESUR, YEAST, [place] in the last 72 hours. Impression: Ms. Everette Meckel is a 57 y.o. female with a history of Diabetic ketoacidosis without coma associated with type 2 diabetes mellitus ([facility] [address]     1. Right lower lobe pneumonia/atelectasis/fluid overload and DKA  2. Chronic hypoxic respiratory insufficiency on 2 to 3 L oxygen  3. Obstructive sleep apnea hahnmtnauy-gpxxun-co as outpatient 4 weeks-Dr. Soumya Guadalupe  4. End-stage renal disease on hemodialysis  5. CHF/history of CABG #hyperlipidemia/hypertension-Lasix, Coreg, Lipitor, 1500 mL fluid restriction  6. Type 2 diabetes/DKA-insulin and sliding scale insulin  7. Neuropathy-Neurontin  8. lymphedema  9. Morbid obesity BMI 44.06  10. TIA versus orthostatic hypotension-Flomax on hold  11. UTI-off antibiotics as cultures negative  12. 4/13 COVID-19 not detected, 3/19 not detected, 2/16 not detected, 1/28 detected  13. Gout-Uloric  14. Depression-BuSpar and trazodone  15. GERD-Protonix  16. -Flomax on hold  17.  History of DVT per cardiology- no objection to Eliquis per cardiology  18. Anemia hemoglobin 10.5  19. Patient in contact isolation     Recommendations:  1. Diagnosis: Deconditioning due to pneumonia/DKA/respiratory insufficiency  2. Therapy: Has PT/OT needs,   3. Medical  Necessity: As above  4. 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 Rehab recommendation: Would benefit acute inpatient rehabilitation when medically ready     -Questionable resume Eliquis as per cardiology 4/15 note for history of DVT     6. DVT proph: Subcu heparin     It was my pleasure to evaluate Freeman Montes De Oca today. Please call with questions. Lillie Paez. Luis De Jesus MD       This note is created with the assistance of a speech recognition program.  While intending to generate a document that actually reflects the content of the visit, the document can still have some errors including those of syntax and sound a like substitutions which may escape proof reading.   In such instances, actual meaning can be extrapolated by contextual diversion

## 2022-04-21 NOTE — PLAN OF CARE
Problem: Falls - Risk of:  Goal: Will remain free from falls  Description: Will remain free from falls  4/21/2022 1246 by Francheska Bedolla RN  Outcome: Progressing  4/21/2022 0413 by Kaley Gao RN  Outcome: Progressing  Goal: Absence of physical injury  Description: Absence of physical injury  4/21/2022 1246 by Francheska Bedolla RN  Outcome: Progressing  4/21/2022 0413 by Kaley Gao RN  Outcome: Progressing     Problem: Skin Integrity:  Goal: Will show no infection signs and symptoms  Description: Will show no infection signs and symptoms  4/21/2022 1246 by Francheska Bedolla RN  Outcome: Progressing  4/21/2022 0413 by Kaley Gao RN  Outcome: Progressing  Goal: Absence of new skin breakdown  Description: Absence of new skin breakdown  4/21/2022 1246 by Francheska Bedolla RN  Outcome: Progressing  4/21/2022 0413 by Kaley Gao RN  Outcome: Progressing     Problem: Musculor/Skeletal Functional Status  Goal: Highest potential functional level  4/21/2022 1246 by Francheska Bedolla RN  Outcome: Progressing  4/21/2022 0413 by Kaley Gao RN  Outcome: Progressing  Goal: Absence of falls  4/21/2022 1246 by Francheska Bedolla RN  Outcome: Progressing  4/21/2022 0413 by Kaley Gao RN  Outcome: Progressing     Problem: Pain:  Goal: Pain level will decrease  Description: Pain level will decrease  4/21/2022 1246 by Francheska Bedolla RN  Outcome: Progressing  4/21/2022 0413 by Kaley Gao RN  Outcome: Progressing  Goal: Control of acute pain  Description: Control of acute pain  4/21/2022 1246 by Francheska Bedolla RN  Outcome: Progressing  4/21/2022 0413 by Kaley Gao RN  Outcome: Progressing  Goal: Control of chronic pain  Description: Control of chronic pain  4/21/2022 1246 by Francheska Bedolla RN  Outcome: Progressing  4/21/2022 0413 by Kaley Gao RN  Outcome: Progressing     Problem: Discharge Planning  Goal: Discharge to home or other facility with appropriate resources  4/21/2022 1246 by Song Nunes RN  Outcome: Progressing  4/21/2022 0413 by Shannon Tyler RN  Outcome: Progressing     Problem: Chronic Conditions and Co-morbidities  Goal: Patient's chronic conditions and co-morbidity symptoms are monitored and maintained or improved  4/21/2022 1246 by Song Nunes RN  Outcome: Progressing  4/21/2022 0413 by Shannon Tyler RN  Outcome: Progressing

## 2022-04-21 NOTE — PROGRESS NOTES
HEMODIALYSIS POST TREATMENT NOTE    Treatment time ordered: 180 minutes    Actual treatment time: 180 minutes    UltraFiltration Goal: 1500 ml  UltraFiltration Removed: 1500 ml      Pre Treatment weight: 113KG  Actual bed scale  Post Treatment weight: 111.5 KG  Estimated Dry Weight:     Access used:     Central Venous Catheter:          Tunneled            Site: Rt. Chest          Access Flow: Good            Sign and symptoms of infection: No       If YES:     Medications or blood products given: No    Chronic outpatient schedule: [facility]    Chronic outpatient unit: [place] [id number]    Summary of response to treatment: Tolerated hemodialysis well    Explain if orders NOT met, was physician notified:N/A      ACES flowsheet faxed to patient unit/ placed in patient chart: N/A, Epic charting    Post assessment completed: Yes    Report given to: Rosio Ortega RN      * Intra-treatment documented Safety Checks include the followin) Access and face visible at all times. 2) All connections and blood lines are secure with no kinks. 3) NVL alarm engaged. 4) Hemosafe device applied (if applicable). 5) No collapse of Arterial or Venous blood chambers. 6) All blood lines / pump segments in the air detectors.

## 2022-04-22 LAB
ABSOLUTE EOS #: 0.2 K/UL (ref 0–0.4)
ABSOLUTE LYMPH #: 0.9 K/UL (ref 1–4.8)
ABSOLUTE MONO #: 0.4 K/UL (ref 0.1–1.3)
ANION GAP SERPL CALCULATED.3IONS-SCNC: 11 MMOL/L (ref 9–17)
BASOPHILS # BLD: 1 % (ref 0–2)
BASOPHILS ABSOLUTE: 0 K/UL (ref 0–0.2)
BUN BLDV-MCNC: 16 MG/DL (ref 8–23)
CALCIUM SERPL-MCNC: 8.9 MG/DL (ref 8.6–10.4)
CHLORIDE BLD-SCNC: 98 MMOL/L (ref 98–107)
CO2: 26 MMOL/L (ref 20–31)
CREAT SERPL-MCNC: 2.86 MG/DL (ref 0.5–0.9)
EOSINOPHILS RELATIVE PERCENT: 4 % (ref 0–4)
GFR AFRICAN AMERICAN: 20 ML/MIN
GFR NON-AFRICAN AMERICAN: 17 ML/MIN
GFR SERPL CREATININE-BSD FRML MDRD: ABNORMAL ML/MIN/{1.73_M2}
GLUCOSE BLD-MCNC: 137 MG/DL (ref 65–105)
GLUCOSE BLD-MCNC: 154 MG/DL (ref 65–105)
GLUCOSE BLD-MCNC: 227 MG/DL (ref 65–105)
GLUCOSE BLD-MCNC: 55 MG/DL (ref 70–99)
GLUCOSE BLD-MCNC: 60 MG/DL (ref 65–105)
GLUCOSE BLD-MCNC: 74 MG/DL (ref 65–105)
HCT VFR BLD CALC: 28.8 % (ref 36–46)
HEMOGLOBIN: 9.8 G/DL (ref 12–16)
LYMPHOCYTES # BLD: 19 % (ref 24–44)
MCH RBC QN AUTO: 31.8 PG (ref 26–34)
MCHC RBC AUTO-ENTMCNC: 34 G/DL (ref 31–37)
MCV RBC AUTO: 93.4 FL (ref 80–100)
MONOCYTES # BLD: 9 % (ref 1–7)
PDW BLD-RTO: 15.6 % (ref 11.5–14.9)
PLATELET # BLD: 124 K/UL (ref 150–450)
PMV BLD AUTO: 10.1 FL (ref 6–12)
POTASSIUM SERPL-SCNC: 4.3 MMOL/L (ref 3.7–5.3)
RBC # BLD: 3.09 M/UL (ref 4–5.2)
SEG NEUTROPHILS: 67 % (ref 36–66)
SEGMENTED NEUTROPHILS ABSOLUTE COUNT: 3.1 K/UL (ref 1.3–9.1)
SODIUM BLD-SCNC: 135 MMOL/L (ref 135–144)
WBC # BLD: 4.6 K/UL (ref 3.5–11)

## 2022-04-22 PROCEDURE — 99232 SBSQ HOSP IP/OBS MODERATE 35: CPT | Performed by: PHYSICAL MEDICINE & REHABILITATION

## 2022-04-22 PROCEDURE — 36415 COLL VENOUS BLD VENIPUNCTURE: CPT

## 2022-04-22 PROCEDURE — 97116 GAIT TRAINING THERAPY: CPT

## 2022-04-22 PROCEDURE — 2580000003 HC RX 258: Performed by: STUDENT IN AN ORGANIZED HEALTH CARE EDUCATION/TRAINING PROGRAM

## 2022-04-22 PROCEDURE — 6370000000 HC RX 637 (ALT 250 FOR IP): Performed by: PSYCHIATRY & NEUROLOGY

## 2022-04-22 PROCEDURE — 82947 ASSAY GLUCOSE BLOOD QUANT: CPT

## 2022-04-22 PROCEDURE — 80048 BASIC METABOLIC PNL TOTAL CA: CPT

## 2022-04-22 PROCEDURE — 6370000000 HC RX 637 (ALT 250 FOR IP): Performed by: INTERNAL MEDICINE

## 2022-04-22 PROCEDURE — 97110 THERAPEUTIC EXERCISES: CPT

## 2022-04-22 PROCEDURE — 6360000002 HC RX W HCPCS: Performed by: FAMILY MEDICINE

## 2022-04-22 PROCEDURE — 6370000000 HC RX 637 (ALT 250 FOR IP): Performed by: FAMILY MEDICINE

## 2022-04-22 PROCEDURE — 85025 COMPLETE CBC W/AUTO DIFF WBC: CPT

## 2022-04-22 PROCEDURE — 97530 THERAPEUTIC ACTIVITIES: CPT

## 2022-04-22 PROCEDURE — 2060000000 HC ICU INTERMEDIATE R&B

## 2022-04-22 RX ORDER — INSULIN LISPRO 100 [IU]/ML
0-9 INJECTION, SOLUTION INTRAVENOUS; SUBCUTANEOUS NIGHTLY
Status: CANCELLED | OUTPATIENT
Start: 2022-04-22

## 2022-04-22 RX ORDER — BUSPIRONE HYDROCHLORIDE 5 MG/1
7.5 TABLET ORAL 3 TIMES DAILY
Status: CANCELLED | OUTPATIENT
Start: 2022-04-22

## 2022-04-22 RX ORDER — DOCUSATE SODIUM 100 MG/1
100 CAPSULE, LIQUID FILLED ORAL 2 TIMES DAILY
Status: CANCELLED | OUTPATIENT
Start: 2022-04-22

## 2022-04-22 RX ORDER — SODIUM CHLORIDE 0.9 % (FLUSH) 0.9 %
5-40 SYRINGE (ML) INJECTION PRN
Status: CANCELLED | OUTPATIENT
Start: 2022-04-22

## 2022-04-22 RX ORDER — FEBUXOSTAT 40 MG/1
40 TABLET, FILM COATED ORAL DAILY
Status: CANCELLED | OUTPATIENT
Start: 2022-04-22

## 2022-04-22 RX ORDER — LIDOCAINE AND PRILOCAINE 25; 25 MG/G; MG/G
CREAM TOPICAL PRN
Status: CANCELLED | OUTPATIENT
Start: 2022-04-22

## 2022-04-22 RX ORDER — CARVEDILOL 3.12 MG/1
3.12 TABLET ORAL 2 TIMES DAILY
Status: CANCELLED | OUTPATIENT
Start: 2022-04-22

## 2022-04-22 RX ORDER — RANOLAZINE 500 MG/1
1000 TABLET, EXTENDED RELEASE ORAL 2 TIMES DAILY
Status: CANCELLED | OUTPATIENT
Start: 2022-04-22

## 2022-04-22 RX ORDER — ATORVASTATIN CALCIUM 80 MG/1
80 TABLET, FILM COATED ORAL DAILY
Status: CANCELLED | OUTPATIENT
Start: 2022-04-22

## 2022-04-22 RX ORDER — INSULIN GLARGINE 100 [IU]/ML
50 INJECTION, SOLUTION SUBCUTANEOUS 2 TIMES DAILY
Status: CANCELLED | OUTPATIENT
Start: 2022-04-22

## 2022-04-22 RX ORDER — ASPIRIN 81 MG/1
81 TABLET ORAL DAILY
Status: CANCELLED | OUTPATIENT
Start: 2022-04-22

## 2022-04-22 RX ORDER — NICOTINE POLACRILEX 4 MG
15 LOZENGE BUCCAL PRN
Status: CANCELLED | OUTPATIENT
Start: 2022-04-22

## 2022-04-22 RX ORDER — DEXTROSE MONOHYDRATE 25 G/50ML
12.5 INJECTION, SOLUTION INTRAVENOUS PRN
Status: CANCELLED | OUTPATIENT
Start: 2022-04-22

## 2022-04-22 RX ORDER — INSULIN LISPRO 100 [IU]/ML
0-18 INJECTION, SOLUTION INTRAVENOUS; SUBCUTANEOUS
Status: CANCELLED | OUTPATIENT
Start: 2022-04-22

## 2022-04-22 RX ORDER — POLYETHYLENE GLYCOL 3350 17 G/17G
17 POWDER, FOR SOLUTION ORAL DAILY PRN
Status: CANCELLED | OUTPATIENT
Start: 2022-04-22

## 2022-04-22 RX ORDER — ACETAMINOPHEN 325 MG/1
650 TABLET ORAL EVERY 4 HOURS PRN
Status: CANCELLED | OUTPATIENT
Start: 2022-04-22

## 2022-04-22 RX ORDER — ONDANSETRON 4 MG/1
4 TABLET, ORALLY DISINTEGRATING ORAL EVERY 8 HOURS PRN
Status: CANCELLED | OUTPATIENT
Start: 2022-04-22

## 2022-04-22 RX ORDER — FUROSEMIDE 40 MG/1
80 TABLET ORAL DAILY
Status: CANCELLED | OUTPATIENT
Start: 2022-04-22

## 2022-04-22 RX ORDER — PANTOPRAZOLE SODIUM 40 MG/1
40 TABLET, DELAYED RELEASE ORAL
Status: CANCELLED | OUTPATIENT
Start: 2022-04-22

## 2022-04-22 RX ORDER — SODIUM CHLORIDE 9 MG/ML
INJECTION, SOLUTION INTRAVENOUS PRN
Status: CANCELLED | OUTPATIENT
Start: 2022-04-22

## 2022-04-22 RX ORDER — SODIUM BICARBONATE 650 MG/1
650 TABLET ORAL 3 TIMES DAILY
Status: CANCELLED | OUTPATIENT
Start: 2022-04-22

## 2022-04-22 RX ORDER — ONDANSETRON 2 MG/ML
4 INJECTION INTRAMUSCULAR; INTRAVENOUS EVERY 6 HOURS PRN
Status: CANCELLED | OUTPATIENT
Start: 2022-04-22

## 2022-04-22 RX ORDER — GABAPENTIN 300 MG/1
300 CAPSULE ORAL 2 TIMES DAILY
Status: CANCELLED | OUTPATIENT
Start: 2022-04-22

## 2022-04-22 RX ORDER — CALCIUM CARBONATE 200(500)MG
500 TABLET,CHEWABLE ORAL 3 TIMES DAILY PRN
Status: CANCELLED | OUTPATIENT
Start: 2022-04-22

## 2022-04-22 RX ORDER — LANOLIN ALCOHOL/MO/W.PET/CERES
9 CREAM (GRAM) TOPICAL NIGHTLY
Status: CANCELLED | OUTPATIENT
Start: 2022-04-22

## 2022-04-22 RX ORDER — SODIUM CHLORIDE 0.9 % (FLUSH) 0.9 %
5-40 SYRINGE (ML) INJECTION EVERY 12 HOURS SCHEDULED
Status: CANCELLED | OUTPATIENT
Start: 2022-04-22

## 2022-04-22 RX ORDER — TRAZODONE HYDROCHLORIDE 50 MG/1
50 TABLET ORAL NIGHTLY
Status: CANCELLED | OUTPATIENT
Start: 2022-04-22

## 2022-04-22 RX ADMIN — POLYETHYLENE GLYCOL 3350 17 G: 17 POWDER, FOR SOLUTION ORAL at 11:37

## 2022-04-22 RX ADMIN — ASPIRIN 81 MG: 81 TABLET, COATED ORAL at 11:29

## 2022-04-22 RX ADMIN — CARVEDILOL 3.12 MG: 3.12 TABLET, FILM COATED ORAL at 11:31

## 2022-04-22 RX ADMIN — ANTI-FUNGAL POWDER MICONAZOLE NITRATE TALC FREE: 1.42 POWDER TOPICAL at 11:32

## 2022-04-22 RX ADMIN — FUROSEMIDE 80 MG: 40 TABLET ORAL at 11:31

## 2022-04-22 RX ADMIN — ANTI-FUNGAL POWDER MICONAZOLE NITRATE TALC FREE: 1.42 POWDER TOPICAL at 21:38

## 2022-04-22 RX ADMIN — INSULIN LISPRO 2 UNITS: 100 INJECTION, SOLUTION INTRAVENOUS; SUBCUTANEOUS at 11:34

## 2022-04-22 RX ADMIN — ATORVASTATIN CALCIUM 80 MG: 80 TABLET, FILM COATED ORAL at 11:36

## 2022-04-22 RX ADMIN — SODIUM BICARBONATE 650 MG: 650 TABLET ORAL at 11:29

## 2022-04-22 RX ADMIN — BUSPIRONE HYDROCHLORIDE 7.5 MG: 5 TABLET ORAL at 11:29

## 2022-04-22 RX ADMIN — SODIUM BICARBONATE 650 MG: 650 TABLET ORAL at 21:34

## 2022-04-22 RX ADMIN — TRAZODONE HYDROCHLORIDE 50 MG: 50 TABLET ORAL at 21:35

## 2022-04-22 RX ADMIN — INSULIN LISPRO 3 UNITS: 100 INJECTION, SOLUTION INTRAVENOUS; SUBCUTANEOUS at 21:42

## 2022-04-22 RX ADMIN — BUSPIRONE HYDROCHLORIDE 7.5 MG: 5 TABLET ORAL at 21:34

## 2022-04-22 RX ADMIN — INSULIN GLARGINE 50 UNITS: 100 INJECTION, SOLUTION SUBCUTANEOUS at 21:41

## 2022-04-22 RX ADMIN — APIXABAN 5 MG: 5 TABLET, FILM COATED ORAL at 11:29

## 2022-04-22 RX ADMIN — Medication 9 MG: at 21:34

## 2022-04-22 RX ADMIN — GABAPENTIN 300 MG: 300 CAPSULE ORAL at 21:35

## 2022-04-22 RX ADMIN — SODIUM CHLORIDE, PRESERVATIVE FREE 10 ML: 5 INJECTION INTRAVENOUS at 11:37

## 2022-04-22 RX ADMIN — PANTOPRAZOLE SODIUM 40 MG: 40 TABLET, DELAYED RELEASE ORAL at 06:08

## 2022-04-22 RX ADMIN — RANOLAZINE 1000 MG: 1000 TABLET, FILM COATED, EXTENDED RELEASE ORAL at 11:33

## 2022-04-22 RX ADMIN — CARVEDILOL 3.12 MG: 3.12 TABLET, FILM COATED ORAL at 21:35

## 2022-04-22 RX ADMIN — DOCUSATE SODIUM 100 MG: 100 CAPSULE, LIQUID FILLED ORAL at 21:35

## 2022-04-22 RX ADMIN — SODIUM CHLORIDE, PRESERVATIVE FREE 10 ML: 5 INJECTION INTRAVENOUS at 21:37

## 2022-04-22 RX ADMIN — FEBUXOSTAT 40 MG: 40 TABLET, FILM COATED ORAL at 11:29

## 2022-04-22 RX ADMIN — APIXABAN 5 MG: 5 TABLET, FILM COATED ORAL at 21:34

## 2022-04-22 RX ADMIN — DOCUSATE SODIUM 100 MG: 100 CAPSULE, LIQUID FILLED ORAL at 11:31

## 2022-04-22 RX ADMIN — HEPARIN SODIUM 5000 UNITS: 5000 INJECTION INTRAVENOUS; SUBCUTANEOUS at 21:36

## 2022-04-22 RX ADMIN — HEPARIN SODIUM 5000 UNITS: 5000 INJECTION INTRAVENOUS; SUBCUTANEOUS at 06:08

## 2022-04-22 RX ADMIN — RANOLAZINE 1000 MG: 1000 TABLET, FILM COATED, EXTENDED RELEASE ORAL at 21:37

## 2022-04-22 RX ADMIN — GABAPENTIN 300 MG: 300 CAPSULE ORAL at 11:29

## 2022-04-22 NOTE — PROGRESS NOTES
Progress Note  Date:2022       Room:[facility]  Patient [facility]     YOB: 1960     Age:62 y.o. Subjective    Subjective:  Symptoms:  (Awake and alert, feeling better. Discharge planning ongoing. ). Diet:  Adequate intake. Activity level: Impaired due to weakness. Review of Systems  Objective         Vitals Last 24 Hours:  TEMPERATURE:  Temp  Av.8 °F (36.6 °C)  Min: 97.5 °F (36.4 °C)  Max: 98.2 °F (36.8 °C)  RESPIRATIONS RANGE: Resp  Av.9  Min: 15  Max: 18  PULSE OXIMETRY RANGE: SpO2  Av.4 %  Min: 95 %  Max: 97 %  PULSE RANGE: Pulse  Av  Min: 58  Max: 68  BLOOD PRESSURE RANGE: Systolic (39LBS), MMY:404 , Min:107 , YHJ:239   ; Diastolic (92SQI), BNV:46, Min:44, Max:59    I/O (24Hr): Intake/Output Summary (Last 24 hours) at 2022 0749  Last data filed at 2022 2254  Gross per 24 hour   Intake 890 ml   Output 2250 ml   Net -1360 ml     Objective:  General Appearance:  Comfortable. Vital signs: (most recent): Blood pressure (!) 113/50, pulse 68, temperature 97.7 °F (36.5 °C), temperature source Oral, resp. rate 18, height 5' 5\" (1.651 m), weight 253 lb 8.5 oz (115 kg), SpO2 96 %. Vital signs are normal.    Lungs:  Normal effort and normal respiratory rate. Breath sounds clear to auscultation. Heart: Normal rate. Regular rhythm. S1 normal and S2 normal.      Labs/Imaging/Diagnostics    Labs:  CBC:  Recent Labs     22  0803 22  0531 22  0537   WBC 5.4 4.8 4.6   RBC 3.36* 3.00* 3.09*   HGB 10.5* 9.4* 9.8*   HCT 31.6* 28.3* 28.8*   MCV 94.2 94.3 93.4   RDW 15.7* 15.8* 15.6*   * 118* 124*     CHEMISTRIES:  Recent Labs     22  0609 22  0531 22  0537    134* 135   K 4.3 4.3 4.3    98 98   CO2 24 25 26   BUN 25* 31* 16   CREATININE 3.39* 4.16* 2.86*   GLUCOSE 125* 91 55*     PT/INR:No results for input(s): PROTIME, INR in the last 72 hours. APTT:No results for input(s):  APTT in the last 72 hours. LIVER PROFILE:No results for input(s): AST, ALT, BILIDIR, BILITOT, ALKPHOS in the last 72 hours. Imaging Last 24 Hours:  No results found.   Assessment//Plan           Hospital Problems           Last Modified POA    * (Principal) Diabetic ketoacidosis without coma associated with type 2 diabetes mellitus ([facility] [address] 4/16/2022 Yes    Atherosclerosis of coronary artery bypass graft of native heart without angina pectoris 4/13/2022 Yes    Chronic diastolic heart failure ([address] 4/13/2022 Yes    Diabetic polyneuropathy associated with type 2 diabetes mellitus ([address] 4/13/2022 Yes    Mixed hyperlipidemia 4/13/2022 Yes    DKA, type 2, not at goal [facility]) 4/19/2022 Yes    Essential hypertension 4/13/2022 Yes    Chronic ischemic heart disease 4/13/2022 Yes    Morbid obesity with BMI of 40.0-44.9, adult ([facility] [address] 4/13/2022 Yes    Long-term memory loss 4/13/2022 Yes    Anxiety 4/13/2022 Yes    Chronic midline low back pain with bilateral sciatica 4/13/2022 Yes    End-stage renal disease ([facility] [address] 4/13/2022 Yes    Hypokalemia 4/14/2022 Yes        Assessment & Plan  Resume Eliquis,, d/c enoxaparin tomorrow    D/c planning for [facility]    Electronically signed by Margarito Reynoso MD on 4/22/22 at 7:49 AM EDT

## 2022-04-22 NOTE — PROGRESS NOTES
Physical Medicine & Rehabilitation  Progress Note    4/22/2022 2:42 PM     CC: Ambulatory and ADL dysfunction due to deconditioning due to pneumonia/DKA/transfer insufficiency    Subjective:   Feels well. No complaints today. Status walking today legs buckled earlier on however and walked again requires assistance. She feels she is making progress    ROS:  Denies fevers, chills, sweats. No chest pain, palpitations, lightheadedness. Denies coughing, wheezing or shortness of breath. Denies abdominal pain, nausea, diarrhea or constipation. No new areas of joint pain. Denies new areas of numbness or weakness. Denies new anxiety or depression issues. No new skin problems. Rehabilitation:   PT:  Restrictions/Precautions: General Precautions,Fall Risk,Up as Tolerated   Transfers  Sit to Stand: Contact guard assistance  Stand to sit: Contact guard assistance  Bed to Chair: Contact guard assistance  Stand Pivot Transfers: Contact guard assistance  Comment: deferreed this date d/t RR called   Ambulation  Surface: level tile  Device: Rolling Walker  Assistance: Contact guard assistance  Quality of Gait: slight forward flexed posture- able to correct with cueing; steady but slow moving  Gait Deviations: Slow Annie,Decreased step length,Decreased step height  Distance: 10ft x2  Comments: patients B LEs started to have tremors when patioent was amb. back to bed           OT:  ADL  Feeding: Setup  Grooming: Setup  UE Bathing: Stand by assistance  LE Bathing: Moderate assistance  UE Dressing: Stand by assistance  LE Dressing: Moderate assistance  Toileting: Moderate assistance  Toileting Skilled Clinical Factors: A with standing balance and hygiene due to tremors in BLE and BUE  Additional Comments: ADL scores based on clinical reasoning and skilled observation unless otherwise noted. OT facilitated pts engagement in toileting task. Pt demonstrated tremors in BLE and BUE during hygiene requiring a seated rest break. Therapist A with remainder of task for safety. Pt currently limited due to decreased strength, balance, and activity tolerance impacting safety and independence with self care tasks. Balance  Sitting Balance: Stand by assistance  Standing Balance: Minimal assistance   Standing Balance  Time: 1-2 minutes x 3  Activity: functional transfers/mobility, toileting  Comment: with RW. BLE/BUE tremors noted with increased fatigue. Fall risk  Functional Mobility  Functional - Mobility Device: Rolling Walker  Activity: To/from bathroom  Assist Level: Minimal assistance  Functional Mobility Comments: Verbal cues for hand placement and safety. BLE tremors noted with increased A with fatigue. Bed mobility  Bridging: Independent  Supine to Sit: Stand by assistance  Sit to Supine: Stand by assistance  Scooting: Stand by assistance  Comment: Bed mobility completed with HOB elevated  Transfers  Sit to stand: Minimal assistance  Stand to sit: Contact guard assistance  Transfer Comments: Verbal cues for hand placement and safety. Increased A from toilet height due to fatigue and BLE tremors   Toilet Transfers  Toilet - Technique: Ambulating  Equipment Used: Grab bars  Toilet Transfer: Minimal assistance  Toilet Transfers Comments: Verbal cues for hand placement and safety. BLE tremors noted               ST:            Objective:  BP (!) 132/55   Pulse 77   Temp 97.5 °F (36.4 °C) (Oral)   Resp 18   Ht 5' 5\" (1.651 m)   Wt 253 lb 8.5 oz (115 kg)   SpO2 90%   BMI 42.19 kg/m²  I Body mass index is 42.19 kg/m². I   Wt Readings from Last 1 Encounters:   22 253 lb 8.5 oz (115 kg)      Temp (24hrs), Av.7 °F (36.5 °C), Min:97.5 °F (36.4 °C), Max:98.2 °F (36.8 °C)         GEN: well developed, well nourished, no acute distress  HEENT: Normocephalic atraumatic, EOMI, mucous membranes pink and moist  CV: RRR, no murmurs, rubs or gallops  PULM: CTAB, no rales or rhonchi.  Respirations WNL and unlabored  ABD: soft, NT, ND, +BS and equal  NEURO: A&O x3. Sensation intact to light touch. MSK: 4/5 upper extremity, at least antigravity lower extremity-appears improved  EXTREMITIES: No calf tenderness to palpation bilaterally. No edema BLEs  SKIN: warm dry and intact with good turgor  PSYCH: appropriately interactive. Affect WNL.         Medications   Scheduled Meds:   apixaban  5 mg Oral BID    insulin glargine  50 Units SubCUTAneous BID    insulin lispro  0-18 Units SubCUTAneous TID WC    insulin lispro  0-9 Units SubCUTAneous Nightly    furosemide  80 mg Oral Daily    aspirin  81 mg Oral Daily    sodium chloride flush  5-40 mL IntraVENous 2 times per day    ranolazine  1,000 mg Oral BID    busPIRone  7.5 mg Oral TID    pantoprazole  40 mg Oral QAM AC    atorvastatin  80 mg Oral Daily    febuxostat  40 mg Oral Daily    docusate sodium  100 mg Oral BID    [Held by provider] tamsulosin  0.4 mg Oral Daily    [Held by provider] insulin lispro  15 Units SubCUTAneous TID AC    sodium bicarbonate  650 mg Oral TID    traZODone  50 mg Oral Nightly    gabapentin  300 mg Oral BID    melatonin  9 mg Oral Nightly    carvedilol  3.125 mg Oral BID    heparin (porcine)  5,000 Units SubCUTAneous 3 times per day    sodium phosphate IVPB  10 mmol IntraVENous Once    miconazole   Topical BID     Continuous Infusions:   sodium chloride 25 mL (04/20/22 1212)     PRN Meds:.calcium carbonate, polyethylene glycol, sodium chloride flush, sodium chloride, ondansetron **OR** ondansetron, lidocaine-prilocaine, anticoagulant sodium citrate, anticoagulant sodium citrate, glucose, dextrose, glucagon (rDNA), acetaminophen     Diagnostics:     CBC:   Recent Labs     04/20/22  0803 04/21/22  0531 04/22/22  0537   WBC 5.4 4.8 4.6   RBC 3.36* 3.00* 3.09*   HGB 10.5* 9.4* 9.8*   HCT 31.6* 28.3* 28.8*   MCV 94.2 94.3 93.4   RDW 15.7* 15.8* 15.6*   * 118* 124*     BMP:   Recent Labs     04/20/22  0609 04/21/22  0531 04/22/22  0537    134* 135   K 4.3 4.3 4.3    98 98   CO2 24 25 26   BUN 25* 31* 16   CREATININE 3.39* 4.16* 2.86*     BNP: No results for input(s): BNP in the last 72 hours. PT/INR: No results for input(s): PROTIME, INR in the last 72 hours. APTT: No results for input(s): APTT in the last 72 hours. CARDIAC ENZYMES: No results for input(s): CKMB, CKMBINDEX, TROPONINT in the last 72 hours. Invalid input(s): CKTOTAL;3  FASTING LIPID PANEL:  Lab Results   Component Value Date    CHOL 105 04/09/2015    HDL 43 04/09/2015    TRIG 168 04/09/2015     LIVER PROFILE: No results for input(s): AST, ALT, ALB, BILIDIR, BILITOT, ALKPHOS in the last 72 hours. I/O (24Hr): Intake/Output Summary (Last 24 hours) at 4/22/2022 1442  Last data filed at 4/22/2022 1241  Gross per 24 hour   Intake 1090 ml   Output 2400 ml   Net -1310 ml       Glu last 24 hour  Recent Labs     04/21/22 2009 04/22/22  0649 04/22/22  0722 04/22/22  1049   POCGLU 89 60* 74 154*       No results for input(s): CLARITYU, COLORU, PHUR, SPECGRAV, PROTEINU, RBCUA, BLOODU, BACTERIA, NITRU, WBCUA, LEUKOCYTESUR, YEAST, GLUCOSEU, BILIRUBINUR in the last 72 hours. Impression: Ms. Lenny Barrera is a 57 y.o. female with a history of Diabetic ketoacidosis without coma associated with type 2 diabetes mellitus ([facility] [address]     1. Right lower lobe pneumonia/atelectasis/fluid overload and DKA  2. Chronic hypoxic respiratory insufficiency on 2 to 3 L oxygen  3. Obstructive sleep apnea [id number] as outpatient 4 weeks-Dr. Werner Weber  4. End-stage renal disease on hemodialysis-Ranexa  5. CHF/history of CABG #hyperlipidemia/hypertension-Lasix, Coreg, Lipitor, 1500 mL fluid restriction  6. Type 2 diabetes/DKA-insulin and sliding scale insulin-glucose -  7. Neuropathy-Neurontin  8. lymphedema  9. Morbid obesity BMI 44.06  10. TIA versus orthostatic hypotension-Flomax on hold  11.  UTI-off antibiotics as cultures negative  12. 4/13/22 COVID-19 not detected, 3/19 not detected, 2/16 not detected, 1/28/22 detected  13. Gout-Uloric  14. Depression-BuSpar and trazodone  15. GERD-Protonix  16. -Flomax on hold  17. History of DVT per cardiology-  Eliquis to be resumed 4/23 DC heparin, per Dr. Vera Simpson  18. Anemia hemoglobin 10.5  19. Patient in contact isolation     Recommendations:  1. Diagnosis: Deconditioning due to pneumonia/DKA/respiratory insufficiency  2. Therapy: Has PT/OT needs,   3. Medical  Necessity: As above  4. 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 Rehab recommendation: Would benefit acute inpatient rehabilitation when medically ready     -Questionable resume Eliquis as per cardiology 4/15 note for history of DVT     6. DVT proph:  Eliquis     It was my pleasure to evaluate Robert Frost today. Please call with questions. Fritz Simon. Ravi Montanez MD       This note is created with the assistance of a speech recognition program.  While intending to generate a document that actually reflects the content of the visit, the document can still have some errors including those of syntax and sound a like substitutions which may escape proof reading.   In such instances, actual meaning can be extrapolated by contextual diversion

## 2022-04-22 NOTE — PROGRESS NOTES
Blood sugar 89 talked to Dr Mitch Dempsey about the blood sugar and lantus 60 iu .  Plan hold it for the night and change it to lantus 50 iu bd

## 2022-04-22 NOTE — CARE COORDINATION
Pt denied ARU, Jennifer informed SW there is P2P opportunity and will have to be completed on Monday morning. [facility] following in hopes to have a bed for pt if not accepted for ARU.

## 2022-04-22 NOTE — PROGRESS NOTES
RN spoke with Dr. Vera Simpson via  [employer] regarding orders for heparin and Eliquis. MD states that heparin is in place until tomorrow morning to overlap 12-24 hours until eliquis is in effect. ADD: 2/44/20@ 5274- RN called and spoke with Edgar Lewis, [address] [place] [address] regarding eliquis and heparin orders. RN was informed that eliquis was in effect after its administration and there was no need to overlap with heparin.

## 2022-04-22 NOTE — PLAN OF CARE
Problem: Falls - Risk of:  Goal: Will remain free from falls  Description: Will remain free from falls  Outcome: Progressing  Goal: Absence of physical injury  Description: Absence of physical injury  Outcome: Progressing     Problem: Skin Integrity:  Goal: Will show no infection signs and symptoms  Description: Will show no infection signs and symptoms  Outcome: Progressing  Goal: Absence of new skin breakdown  Description: Absence of new skin breakdown  Outcome: Progressing     Problem: Musculor/Skeletal Functional Status  Goal: Highest potential functional level  Outcome: Progressing  Goal: Absence of falls  Outcome: Progressing     Problem: Pain:  Goal: Pain level will decrease  Description: Pain level will decrease  Outcome: Progressing  Goal: Control of acute pain  Description: Control of acute pain  Outcome: Progressing  Goal: Control of chronic pain  Description: Control of chronic pain  Outcome: Progressing     Problem: Discharge Planning  Goal: Discharge to home or other facility with appropriate resources  Outcome: Progressing     Problem: Chronic Conditions and Co-morbidities  Goal: Patient's chronic conditions and co-morbidity symptoms are monitored and maintained or improved  Outcome: Progressing     Problem: ABCDS Injury Assessment  Goal: Absence of physical injury  Outcome: Progressing

## 2022-04-22 NOTE — PROGRESS NOTES
Rcv'd fax from [facility] with denial for ARU d/t \"pt does not require the intensity of services provided at an ARU, close physician involvement, or a coordinated interdisciplinary team approach. Also, pt has no medical necessity\". Per Dr Bonnie Estrada is approp at this time. Jong Tony, notified. P2P scheduled with [facility] @ [facility], and Dr Timothy Reyes will call Dr Izaiah Villanueva for P2P discussion 9am-12noon Monday, 4-25-22.

## 2022-04-22 NOTE — FLOWSHEET NOTE
04/22/22 1342   Encounter Summary   Encounter Overview/Reason  Spiritual/Emotional Needs   Service Provided For: Patient   Referral/Consult From: Rounding   Last Encounter  04/22/22   Complexity of Encounter Low   Begin Time 1000   End Time  1015   Total Time Calculated 15 min   Spiritual/Emotional needs   Type Spiritual Support   Rituals, Rites and Sacraments   Type [employer]ion

## 2022-04-22 NOTE — PROGRESS NOTES
Jason Bhatt MD/Carter Goncalves MD/ Rena Baca MD/Jordan Shah APRN AGACNP-BC, NP-C      Najma Rick APRANAHY NP-C     Ezequiel Booth APRN NP-C                                           Pulmonary Progress Note    Patient - Qasim Mathews   Age - 57 y.o.   - 1960  MRN - [id number]  Acct # - [de-identified]  Date of Admission - 2022  3:17 PM    Consulting Service/Physician:       Primary Care Physician: AFSANEH Meier - CNP    SUBJECTIVE:     Chief Complaint:   Chief Complaint   Patient presents with    Shortness of Breath     Subjective:    She tells me she is doing very well, she does still get short of breath at times, she was able to take a shower was kind of tough for her she says she feels much better. She denies any significant coughing or phlegm production, no chest pain or dizziness. She is awaiting presurgical condition to acute rehabilitation. VITALS  BP (!) 113/50   Pulse 68   Temp 97.7 °F (36.5 °C) (Oral)   Resp 18   Ht 5' 5\" (1.651 m)   Wt 253 lb 8.5 oz (115 kg)   SpO2 91%   BMI 42.19 kg/m²   Wt Readings from Last 3 Encounters:   22 253 lb 8.5 oz (115 kg)   22 248 lb (112.5 kg)   22 250 lb (113.4 kg)     I/O (24 Hours)    Intake/Output Summary (Last 24 hours) at 2022 1047  Last data filed at 2022 7032  Gross per 24 hour   Intake 590 ml   Output 2250 ml   Net -1660 ml     Ventilator:      Exam:   Physical Exam   Constitutional:  Oriented to person, place, and time. Appears well-developed and well-nourished. Sitting up in bed no apparent distress  HENT: Unremarkable, nasal cannula in place  Head: Normocephalic and atraumatic. Eyes: EOM are normal. Pupils are equal, round, and reactive to light. Neck: Neck supple. Cardiovascular:  Regular rate and rhythm. Normal heart tones. No JVD.     Pulmonary/Chest: Effort normal and breath sounds diminished due to body habitus, respirations easy and nonlabored on nasal cannula  Abdominal: Obese Soft. Bowel sounds are normal.   Musculoskeletal: Normal range of motion. Very weak, I did need to assist her to sit up in bed listened to her lungs  Neurological:patient is alert and oriented to person, place, and time. Skin: Skin is warm and dry.    Extremities: Bilateral lower extremity trace edema, improved  Infusions:      sodium chloride 25 mL (04/20/22 1212)     Meds:     Current Facility-Administered Medications:     apixaban (ELIQUIS) tablet 5 mg, 5 mg, Oral, BID, Gus Lesch, MD    insulin glargine (LANTUS) injection vial 50 Units, 50 Units, SubCUTAneous, BID, Genesis Bowens MD    insulin lispro (HUMALOG) injection vial 0-18 Units, 0-18 Units, SubCUTAneous, TID WC, Genesis Bowens MD, 3 Units at 04/21/22 1212    insulin lispro (HUMALOG) injection vial 0-9 Units, 0-9 Units, SubCUTAneous, Nightly, Genesis Bowens MD, 5 Units at 04/20/22 2136    calcium carbonate (TUMS) chewable tablet 500 mg, 500 mg, Oral, TID PRN, Genesis Bowens MD, 500 mg at 04/20/22 1751    furosemide (LASIX) tablet 80 mg, 80 mg, Oral, Daily, Tarik Winn MD, 80 mg at 04/21/22 7098    polyethylene glycol (GLYCOLAX) packet 17 g, 17 g, Oral, Daily PRN, Gus Lesch, MD    aspirin EC tablet 81 mg, 81 mg, Oral, Daily, Leroy Fraire MD, 81 mg at 04/21/22 3001    sodium chloride flush 0.9 % injection 5-40 mL, 5-40 mL, IntraVENous, 2 times per day, Aleatha Blizzard, DO, 10 mL at 04/21/22 2041    sodium chloride flush 0.9 % injection 5-40 mL, 5-40 mL, IntraVENous, PRN, Aleatha Blizzard, DO    0.9 % sodium chloride infusion, , IntraVENous, PRN, Aleatha Blizzard, DO, Last Rate: 25 mL/hr at 04/20/22 1212, 25 mL at 04/20/22 1212    ondansetron (ZOFRAN-ODT) disintegrating tablet 4 mg, 4 mg, Oral, Q8H PRN **OR** ondansetron (ZOFRAN) injection 4 mg, 4 mg, IntraVENous, Q6H PRN, Aleatha Blizzard, DO    ranolazine (RANEXA) extended release tablet 1,000 mg, 1,000 mg, Oral, BID, Genesis Bowens MD, 1,000 mg at 04/21/22 2053    busPIRone (BUSPAR) tablet 7.5 mg, 7.5 mg, Oral, TID, Genesis Bowens MD, 7.5 mg at 04/21/22 2041    pantoprazole (PROTONIX) tablet 40 mg, 40 mg, Oral, QAM AC, Genesis Bowens MD, 40 mg at 04/22/22 0608    atorvastatin (LIPITOR) tablet 80 mg, 80 mg, Oral, Daily, Genesis Bowens MD, 80 mg at 04/21/22 0917    febuxostat (ULORIC) tablet 40 mg, 40 mg, Oral, Daily, Genesis Bowens MD, 40 mg at 04/21/22 7853    docusate sodium (COLACE) capsule 100 mg, 100 mg, Oral, BID, Genesis Bowens MD, 100 mg at 04/21/22 2042    [Held by provider] tamsulosin (FLOMAX) capsule 0.4 mg, 0.4 mg, Oral, Daily, Genesis Bowens MD, 0.4 mg at 04/18/22 0815    [Held by provider] insulin lispro (HUMALOG) injection vial 15 Units, 15 Units, SubCUTAneous, TID AC, Genesis Bowens MD, 15 Units at 04/16/22 1713    sodium bicarbonate tablet 650 mg, 650 mg, Oral, TID, Genesis Bowens MD, 650 mg at 04/21/22 2043    traZODone (DESYREL) tablet 50 mg, 50 mg, Oral, Nightly, Genesis Bowens MD, 50 mg at 04/21/22 2043    gabapentin (NEURONTIN) capsule 300 mg, 300 mg, Oral, BID, Genesis Bowens MD, 300 mg at 04/21/22 2042    melatonin tablet 9 mg, 9 mg, Oral, Nightly, Genesis Bowens MD, 9 mg at 04/21/22 2042    lidocaine-prilocaine (EMLA) cream, , Topical, PRN, Genesis Bowens MD    carvedilol (COREG) tablet 3.125 mg, 3.125 mg, Oral, BID, Genesis Bowens MD, 3.125 mg at 04/21/22 2042    heparin (porcine) injection 5,000 Units, 5,000 Units, SubCUTAneous, 3 times per day, Gus Lesch, MD, 5,000 Units at 04/22/22 0608    sodium phosphate 10 mmol in dextrose 5 % 250 mL IVPB, 10 mmol, IntraVENous, Once, Tarik Winn MD    anticoagulant sodium citrate 4 % injection 1.6 mL, 1.6 mL, IntraCATHeter, PRN, Tarik Winn MD, 1.6 mL at 04/21/22 1640    anticoagulant sodium citrate 4 % injection 1.6 mL, 1.6 mL, IntraCATHeter, PRN, Viraj Desir MD, 1.6 mL at 04/21/22 1640    glucose (GLUTOSE) 40 % oral gel 15 g, 15 g, Oral, PRN, Callum Davis MD    dextrose 50 % IV solution, 12.5 g, IntraVENous, PRN, Callum Davis MD    glucagon (rDNA) injection 1 mg, 1 mg, IntraMUSCular, PRN, Callum Davis MD    miconazole (MICOTIN) 2 % powder, , Topical, BID, Callum Davis MD, Given at 04/21/22 2542    acetaminophen (TYLENOL) tablet 650 mg, 650 mg, Oral, Q4H PRN, Dylan Lambert DO, 650 mg at 04/20/22 1751    Lab Results:     Lab Results   Component Value Date    WBC 4.6 04/22/2022    HGB 9.8 (L) 04/22/2022    HCT 28.8 (L) 04/22/2022    MCV 93.4 04/22/2022     (L) 04/22/2022     Lab Results   Component Value Date    CALCIUM 8.9 04/22/2022     04/22/2022    K 4.3 04/22/2022    CO2 26 04/22/2022    CL 98 04/22/2022    BUN 16 04/22/2022    CREATININE 2.86 (H) 04/22/2022       Lab Results   Component Value Date    INR 0.9 11/10/2021    PROTIME 12.7 11/10/2021     2D echo 4/13/22  Left ventricle is normal in size. Moderate left ventricular hypertrophy. Global left ventricular systolic function is normal. Estimated LV EF >55 %. No obvious wall motion abnormality seen. Evidence of mild (grade I)  diastolic dysfunction. Left atrium is normal in size. Mild tricuspid regurgitation. No pulmonary hypertension. Estimated right ventricular systolic pressure is  [id number]. Radiology:       4/19/22                                                                                  4/15/22  Chest x-ray continues show some improvement, with vascular congestion still some congestion noted to the left lower lobe  ASSESSMENT:       1. RLL pneumonia/atelectasis versus fluid volume excess  2. DKA - resolved  3. HTN urgency  4. UTI- on Ceftriaxone, culture with no signficant growth  5. ESRD on hemodialysis  6. Acute on Chronic diastolic heart failure-Grade 1  7.  Syncopal episode yesterday thought to be related to orthostatic hypotension  8. Chronic hypoxic respiratory insufficiency-on 2 L  9. ERICK (clinical diagnosis)  10. Obesity  PLAN:   1. Completed abx for UTI  2. On home dose of oxygen  3. Likely needs sleep study as an outpatient  4. Can f/u in our office in 4 weeks, she did call our office and we do  Not take her insurance, encouraged her to follow up with [id number] [facility] Due to location And her preference  5. No objection to discharge to acute rehab, we can follow with her there as needed  6. eliquis resumed today per primary care      Electronically signed by Ely Carrel, APRN - CNP on 04/22/22     This progress note was completed using a voice transcription system. Every effort was made to ensure accuracy. However, inadvertent computerized transcription errors may be present.     SACHA VAZQUEZ-BC, NP-C  [facility] Pulmonary, Critical Care & Sleep

## 2022-04-22 NOTE — PROGRESS NOTES
Physical Therapy  Dom Brown   Physical Therapy Progress Note    Date: 22  Patient Name: Tanvi Evans       Room: 2079/3015-54  MRN: [id number]   Account: [de-identified]   : 1960  (60 y.o.) Gender: female        Diagnosis: DKA  Past Medical History:  has a past medical history of Backache, unspecified, CHF (congestive heart failure) ([address] Chronic kidney disease, Coronary atherosclerosis of artery bypass graft, COVID, Cramp of limb, Gallstones, Hypertension, Insomnia, Pneumonia, Type II or unspecified type diabetes mellitus with renal manifestations, not stated as uncontrolled(250.40), Type II or unspecified type diabetes mellitus without mention of complication, not stated as uncontrolled, and Unspecified vitamin D deficiency. Past Surgical History:   has a past surgical history that includes Coronary artery bypass graft; Knee arthroscopy; Carpal tunnel release; Breast surgery; Tonsillectomy; Hand surgery; Ankle fracture surgery; Cholecystectomy, open (N/A); IR TUNNELED CVC PLACE WO SQ PORT/PUMP > 5 YEARS (2021); AV fistula creation (2021); Dialysis fistula creation (Left, 2021); and other surgical history (2022). Additional Pertinent Hx: The patient is a 57 y.o. female who presents to [facility] with complaints of shortness of breath. She has a known hx of CHF, DM2 and ESRD on hemodialysis. She states that the SOB has been progressively worsening over the past few days. SOB was not relieved by Dialysis treatment. Overall Orientation Status: Within Functional Limits  Restrictions/Precautions  Restrictions/Precautions: General Precautions; Fall Risk;Up as Tolerated  Required Braces or Orthoses?: Yes    Subjective: Patient laying in bed upon arrival, agreeable to therapy   Comments: DEDE Angel approved therapy     Vital Signs  Pulse: 77  Heart Rate Source: Monitor  Pain Assessment: None - Denies Pain     Oxygen Therapy  SpO2: 90 %  Pulse Oximeter Device Mode: Intermittent  Pulse Oximeter Device Location: Finger  O2 Device: Nasal cannula  O2 Flow Rate (L/min): 2 L/min          Bed Mobility:   Bed Mobility  Rolling: Stand by assistance  Supine to Sit: Stand by assistance  Sit to Supine: Stand by assistance  Scooting: Stand by assistance  Bed mobility  Scooting: Stand by assistance    Transfers:  Sit to Stand: Contact guard assistance  Stand to sit: Contact guard assistance  Bed to Chair: Contact guard assistance              Ambulation  Surface: level tile  Device: Rolling Walker  Assistance: Contact guard assistance  Quality of Gait: slight forward flexed posture- able to correct with cueing; steady but slow moving  Gait Deviations: Slow Annie;Decreased step length;Decreased step height  Distance: 10ft x2  Comments: patients B LEs started to have tremors when patioent was amb. back to bed         Stairs/Curb  Stairs?: No    EXERCISES    Other exercises?: Yes  Other exercises 1: bed mobility x2  Other exercises 2: toilet transfer   Other exercises 3: STS x5  Other exercises 4: standing tolerance x2 ~15 seconds each. Patients B LEs startesd to tremor and her knees would buckle causing her to sit back down on the bed   Other exercises 5: trialed standing and ambulating 3 ft FWD/ 3ft BWD, patients B LEs started to tremor when patient was stepping back towards the bed            Activity Tolerance: Patient tolerated treatment well,Patient limited by fatigue,Patient limited by endurance  PT Equipment Recommendations  Equipment Needed: No       Current Treatment Recommendations: Strengthening,Balance training,Functional mobility training,Transfer training,Endurance training,Gait training    Conditions Requiring Skilled Therapeutic Intervention  Body Structures, Functions, Activity Limitations Requiring Skilled Therapeutic Intervention: Decreased functional mobility ; Decreased strength;Decreased endurance;Decreased balance;Decreased posture  Treatment Diagnosis: Impaired functional mobility and endurance 2* fatigue and nausea associated with Diabetic Ketoacidosis  History: H/O CVA- was in ARU in Nov,Dec, of 2021 and continued OP Therapy; CHF, Type 2 DM, Hemodialysis on T/TH  Discharge Recommendations: Patient would benefit from continued therapy after discharge;Home with assist PRN;Outpatient PT    Goals  Short Term Goals  Time Frame for Short term goals: 5 days  Short term goal 1: pt to demo independent bed mobility   Short term goal 2: pt to perform all Transfers with Rollator (or RW), Mod I  Short term goal 3: pt to ambulate household distances of 54-65' with Rollator (or RW) to improve independence and safety with mobility.    Short term goal 4: pt to improve standing balance to good with Use of AD  Short term goal 5: pt to improve standing tolerance to 10 minutes to improve tolerance for daily activities and ADLs       04/22/22 1341   PT Individual Minutes   Time In 0820   Time Out 0858   Minutes 38       Electronically signed by Rayray Houston PTA on 4/22/22 at 1:42 PM EDT

## 2022-04-23 LAB
ABSOLUTE EOS #: 0.2 K/UL (ref 0–0.4)
ABSOLUTE LYMPH #: 0.9 K/UL (ref 1–4.8)
ABSOLUTE MONO #: 0.5 K/UL (ref 0.1–1.3)
ANION GAP SERPL CALCULATED.3IONS-SCNC: 11 MMOL/L (ref 9–17)
BASOPHILS # BLD: 1 % (ref 0–2)
BASOPHILS ABSOLUTE: 0 K/UL (ref 0–0.2)
BUN BLDV-MCNC: 23 MG/DL (ref 8–23)
CALCIUM SERPL-MCNC: 9.1 MG/DL (ref 8.6–10.4)
CHLORIDE BLD-SCNC: 97 MMOL/L (ref 98–107)
CO2: 26 MMOL/L (ref 20–31)
CREAT SERPL-MCNC: 3.66 MG/DL (ref 0.5–0.9)
EOSINOPHILS RELATIVE PERCENT: 4 % (ref 0–4)
GFR AFRICAN AMERICAN: 15 ML/MIN
GFR NON-AFRICAN AMERICAN: 12 ML/MIN
GFR SERPL CREATININE-BSD FRML MDRD: ABNORMAL ML/MIN/{1.73_M2}
GLUCOSE BLD-MCNC: 134 MG/DL (ref 65–105)
GLUCOSE BLD-MCNC: 135 MG/DL (ref 70–99)
GLUCOSE BLD-MCNC: 144 MG/DL (ref 65–105)
GLUCOSE BLD-MCNC: 157 MG/DL (ref 65–105)
GLUCOSE BLD-MCNC: 211 MG/DL (ref 65–105)
GLUCOSE BLD-MCNC: 89 MG/DL (ref 65–105)
HCT VFR BLD CALC: 28.6 % (ref 36–46)
HEMOGLOBIN: 9.8 G/DL (ref 12–16)
LYMPHOCYTES # BLD: 20 % (ref 24–44)
MCH RBC QN AUTO: 31.9 PG (ref 26–34)
MCHC RBC AUTO-ENTMCNC: 34.2 G/DL (ref 31–37)
MCV RBC AUTO: 93.3 FL (ref 80–100)
MONOCYTES # BLD: 11 % (ref 1–7)
PDW BLD-RTO: 15.8 % (ref 11.5–14.9)
PLATELET # BLD: 126 K/UL (ref 150–450)
PMV BLD AUTO: 9.7 FL (ref 6–12)
POTASSIUM SERPL-SCNC: 4.9 MMOL/L (ref 3.7–5.3)
RBC # BLD: 3.07 M/UL (ref 4–5.2)
SEG NEUTROPHILS: 64 % (ref 36–66)
SEGMENTED NEUTROPHILS ABSOLUTE COUNT: 2.8 K/UL (ref 1.3–9.1)
SODIUM BLD-SCNC: 134 MMOL/L (ref 135–144)
WBC # BLD: 4.3 K/UL (ref 3.5–11)

## 2022-04-23 PROCEDURE — 2060000000 HC ICU INTERMEDIATE R&B

## 2022-04-23 PROCEDURE — 2580000003 HC RX 258: Performed by: STUDENT IN AN ORGANIZED HEALTH CARE EDUCATION/TRAINING PROGRAM

## 2022-04-23 PROCEDURE — 6370000000 HC RX 637 (ALT 250 FOR IP): Performed by: INTERNAL MEDICINE

## 2022-04-23 PROCEDURE — 6370000000 HC RX 637 (ALT 250 FOR IP): Performed by: FAMILY MEDICINE

## 2022-04-23 PROCEDURE — 80048 BASIC METABOLIC PNL TOTAL CA: CPT

## 2022-04-23 PROCEDURE — 85025 COMPLETE CBC W/AUTO DIFF WBC: CPT

## 2022-04-23 PROCEDURE — 90935 HEMODIALYSIS ONE EVALUATION: CPT

## 2022-04-23 PROCEDURE — 6370000000 HC RX 637 (ALT 250 FOR IP): Performed by: PSYCHIATRY & NEUROLOGY

## 2022-04-23 PROCEDURE — P9047 ALBUMIN (HUMAN), 25%, 50ML: HCPCS | Performed by: INTERNAL MEDICINE

## 2022-04-23 PROCEDURE — 99231 SBSQ HOSP IP/OBS SF/LOW 25: CPT | Performed by: FAMILY MEDICINE

## 2022-04-23 PROCEDURE — 36415 COLL VENOUS BLD VENIPUNCTURE: CPT

## 2022-04-23 PROCEDURE — 82947 ASSAY GLUCOSE BLOOD QUANT: CPT

## 2022-04-23 PROCEDURE — 2500000003 HC RX 250 WO HCPCS: Performed by: INTERNAL MEDICINE

## 2022-04-23 PROCEDURE — 6360000002 HC RX W HCPCS: Performed by: INTERNAL MEDICINE

## 2022-04-23 RX ORDER — ALBUMIN (HUMAN) 12.5 G/50ML
25 SOLUTION INTRAVENOUS ONCE
Status: COMPLETED | OUTPATIENT
Start: 2022-04-23 | End: 2022-04-23

## 2022-04-23 RX ORDER — MIDODRINE HYDROCHLORIDE 10 MG/1
10 TABLET ORAL
Status: DISPENSED | OUTPATIENT
Start: 2022-04-23 | End: 2022-04-24

## 2022-04-23 RX ADMIN — APIXABAN 5 MG: 5 TABLET, FILM COATED ORAL at 20:47

## 2022-04-23 RX ADMIN — RANOLAZINE 1000 MG: 1000 TABLET, FILM COATED, EXTENDED RELEASE ORAL at 10:31

## 2022-04-23 RX ADMIN — POLYETHYLENE GLYCOL 3350 17 G: 17 POWDER, FOR SOLUTION ORAL at 10:38

## 2022-04-23 RX ADMIN — ATORVASTATIN CALCIUM 80 MG: 80 TABLET, FILM COATED ORAL at 08:40

## 2022-04-23 RX ADMIN — GABAPENTIN 300 MG: 300 CAPSULE ORAL at 08:39

## 2022-04-23 RX ADMIN — BUSPIRONE HYDROCHLORIDE 7.5 MG: 5 TABLET ORAL at 20:47

## 2022-04-23 RX ADMIN — ANTI-FUNGAL POWDER MICONAZOLE NITRATE TALC FREE: 1.42 POWDER TOPICAL at 20:51

## 2022-04-23 RX ADMIN — ANTI-FUNGAL POWDER MICONAZOLE NITRATE TALC FREE: 1.42 POWDER TOPICAL at 08:50

## 2022-04-23 RX ADMIN — Medication 1.6 ML: at 19:00

## 2022-04-23 RX ADMIN — ANTACID TABLETS 500 MG: 500 TABLET, CHEWABLE ORAL at 10:37

## 2022-04-23 RX ADMIN — APIXABAN 5 MG: 5 TABLET, FILM COATED ORAL at 08:39

## 2022-04-23 RX ADMIN — DOCUSATE SODIUM 100 MG: 100 CAPSULE, LIQUID FILLED ORAL at 20:47

## 2022-04-23 RX ADMIN — RANOLAZINE 1000 MG: 1000 TABLET, FILM COATED, EXTENDED RELEASE ORAL at 20:47

## 2022-04-23 RX ADMIN — INSULIN LISPRO 3 UNITS: 100 INJECTION, SOLUTION INTRAVENOUS; SUBCUTANEOUS at 17:12

## 2022-04-23 RX ADMIN — BUSPIRONE HYDROCHLORIDE 7.5 MG: 5 TABLET ORAL at 08:40

## 2022-04-23 RX ADMIN — SODIUM CHLORIDE, PRESERVATIVE FREE 10 ML: 5 INJECTION INTRAVENOUS at 08:46

## 2022-04-23 RX ADMIN — SODIUM BICARBONATE 650 MG: 650 TABLET ORAL at 13:47

## 2022-04-23 RX ADMIN — ASPIRIN 81 MG: 81 TABLET, COATED ORAL at 08:40

## 2022-04-23 RX ADMIN — ALBUMIN (HUMAN) 25 G: 0.25 INJECTION, SOLUTION INTRAVENOUS at 15:56

## 2022-04-23 RX ADMIN — SODIUM BICARBONATE 650 MG: 650 TABLET ORAL at 20:47

## 2022-04-23 RX ADMIN — BUSPIRONE HYDROCHLORIDE 7.5 MG: 5 TABLET ORAL at 13:47

## 2022-04-23 RX ADMIN — DOCUSATE SODIUM 100 MG: 100 CAPSULE, LIQUID FILLED ORAL at 08:39

## 2022-04-23 RX ADMIN — CARVEDILOL 3.12 MG: 3.12 TABLET, FILM COATED ORAL at 08:40

## 2022-04-23 RX ADMIN — SODIUM BICARBONATE 650 MG: 650 TABLET ORAL at 08:39

## 2022-04-23 RX ADMIN — SODIUM CHLORIDE, PRESERVATIVE FREE 10 ML: 5 INJECTION INTRAVENOUS at 20:48

## 2022-04-23 RX ADMIN — INSULIN LISPRO 6 UNITS: 100 INJECTION, SOLUTION INTRAVENOUS; SUBCUTANEOUS at 12:06

## 2022-04-23 RX ADMIN — PANTOPRAZOLE SODIUM 40 MG: 40 TABLET, DELAYED RELEASE ORAL at 05:44

## 2022-04-23 RX ADMIN — GABAPENTIN 300 MG: 300 CAPSULE ORAL at 20:47

## 2022-04-23 RX ADMIN — FEBUXOSTAT 40 MG: 40 TABLET, FILM COATED ORAL at 10:31

## 2022-04-23 RX ADMIN — FUROSEMIDE 80 MG: 40 TABLET ORAL at 08:40

## 2022-04-23 RX ADMIN — INSULIN GLARGINE 50 UNITS: 100 INJECTION, SOLUTION SUBCUTANEOUS at 08:50

## 2022-04-23 RX ADMIN — CARVEDILOL 3.12 MG: 3.12 TABLET, FILM COATED ORAL at 20:47

## 2022-04-23 NOTE — PROGRESS NOTES
Progress Note  Date:2022       Room:[address] [place] [address]  Patient Rashid Vogt     YOB: 1960     Age:62 y.o. Subjective    Subjective:  Symptoms:  Stable. Diet:  Adequate intake. Activity level: Impaired due to weakness. Pain:  She reports no pain. Review of Systems  Objective         Vitals Last 24 Hours:  TEMPERATURE:  Temp  Av.9 °F (36.6 °C)  Min: 97.5 °F (36.4 °C)  Max: 98.6 °F (37 °C)  RESPIRATIONS RANGE: Resp  Av.5  Min: 18  Max: 20  PULSE OXIMETRY RANGE: SpO2  Av %  Min: 90 %  Max: 98 %  PULSE RANGE: Pulse  Av.2  Min: 62  Max: 77  BLOOD PRESSURE RANGE: Systolic (26ZAX), UP , Min:111 , ZIN:285   ; Diastolic (27JAC), MFK:56, Min:38, Max:88    I/O (24Hr): Intake/Output Summary (Last 24 hours) at 2022 1000  Last data filed at 2022 0844  Gross per 24 hour   Intake 1475 ml   Output 500 ml   Net 975 ml     Objective:  General Appearance:  Comfortable. Vital signs: (most recent): Blood pressure (!) 157/71, pulse 70, temperature 98.6 °F (37 °C), temperature source Oral, resp. rate 20, height 5' 5\" (1.651 m), weight 258 lb 6.1 oz (117.2 kg), SpO2 94 %. Vital signs are normal.    Lungs:  Normal effort and normal respiratory rate. Breath sounds clear to auscultation. Heart: Normal rate. S1 normal and S2 normal.      Labs/Imaging/Diagnostics    Labs:  CBC:  Recent Labs     22  0531 22  0537 22  0535   WBC 4.8 4.6 4.3   RBC 3.00* 3.09* 3.07*   HGB 9.4* 9.8* 9.8*   HCT 28.3* 28.8* 28.6*   MCV 94.3 93.4 93.3   RDW 15.8* 15.6* 15.8*   * 124* 126*     CHEMISTRIES:  Recent Labs     22  0531 22  0537 22  0535   * 135 134*   K 4.3 4.3 4.9   CL 98 98 97*   CO2 25 26 26   BUN 31* 16 23   CREATININE 4.16* 2.86* 3.66*   GLUCOSE 91 55* 135*     PT/INR:No results for input(s): PROTIME, INR in the last 72 hours. APTT:No results for input(s): APTT in the last 72 hours.   LIVER PROFILE:No results for input(s): AST, ALT, BILIDIR, BILITOT, ALKPHOS in the last 72 hours. Imaging Last 24 Hours:  No results found.   Assessment//Plan           Hospital Problems           Last Modified POA    * (Principal) Diabetic ketoacidosis without coma associated with type 2 diabetes mellitus ([address] 4/16/2022 Yes    Atherosclerosis of coronary artery bypass graft of native heart without angina pectoris 4/13/2022 Yes    Chronic diastolic heart failure ([address] 4/13/2022 Yes    Diabetic polyneuropathy associated with type 2 diabetes mellitus ([address] 4/13/2022 Yes    Mixed hyperlipidemia 4/13/2022 Yes    DKA, type 2, not at goal [facility]) 4/19/2022 Yes    Essential hypertension 4/13/2022 Yes    Chronic ischemic heart disease 4/13/2022 Yes    Morbid obesity with BMI of 40.0-44.9, adult ([address] 4/13/2022 Yes    Long-term memory loss 4/13/2022 Yes    Anxiety 4/13/2022 Yes    Chronic midline low back pain with bilateral sciatica 4/13/2022 Yes    End-stage renal disease ([address] 4/13/2022 Yes    Hypokalemia 4/14/2022 Yes        Assessment & Plan  Awaiting placement      Electronically signed by Trudy Anderson MD on 4/23/22 at 10:00 AM EDT

## 2022-04-23 NOTE — CARE COORDINATION
ONGOING DISCHARGE PLANNING NOTE:    Writer reviewed LSW notes, and discharge plan is for Pt. To go to ARU. Pt. Has Been Denied, However, there will be a P2P on Monday. [facility], is following, if not accepted at ARU.      Electronically signed by Gwen Rangel RN on 4/23/2022 at 12:39 PM

## 2022-04-23 NOTE — PROGRESS NOTES
HEMODIALYSIS PRE-TREATMENT NOTE    Patient Identifiers prior to treatment: Name, , MRN    Isolation Required: yes                     Isolation Type: contact isolation       (please document if patient is being managed as a PUI/COVID-19 patient)        Hepatitis status:                           Date Drawn                             Result  Hepatitis B Surface Antigen 21 negative                    Hepatitis B Surface Antibody 21 60 (Patient immune)        Hepatitis B Core Antibody 21 negative          How was Hepatitis Status verified: Hard copy from patient's outpatient clinic    Hemodialysis orders verified: Yes    Access Within normal limits: Yes    Pre-Assessment completed: Yes    Pre-dialysis report received from: Cam Mccarthy RN                      Time: 32  16

## 2022-04-23 NOTE — PLAN OF CARE
Problem: Falls - Risk of:  Goal: Will remain free from falls  Description: Will remain free from falls  Outcome: Progressing  Goal: Absence of physical injury  Description: Absence of physical injury  Outcome: Progressing     Problem: Skin Integrity:  Goal: Will show no infection signs and symptoms  Description: Will show no infection signs and symptoms  Outcome: Progressing  Goal: Absence of new skin breakdown  Description: Absence of new skin breakdown  Outcome: Progressing     Problem: Musculor/Skeletal Functional Status  Goal: Highest potential functional level  Outcome: Progressing  Goal: Absence of falls  Outcome: Progressing     Problem: Pain:  Goal: Pain level will decrease  Description: Pain level will decrease  Outcome: Progressing  Goal: Control of acute pain  Description: Control of acute pain  Outcome: Progressing  Goal: Control of chronic pain  Description: Control of chronic pain  Outcome: Progressing     Problem: ABCDS Injury Assessment  Goal: Absence of physical injury  Outcome: Progressing

## 2022-04-23 NOTE — PLAN OF CARE
Problem: Falls - Risk of:  Goal: Will remain free from falls  Description: Will remain free from falls  Outcome: Progressing   Patient without falls or injury this shift  Problem: Musculor/Skeletal Functional Status  Goal: Highest potential functional level  Outcome: Progressing     Problem: Pain:  Goal: Pain level will decrease  Description: Pain level will decrease  Outcome: Progressing   Patient denies pain at this time  Problem: Discharge Planning  Goal: Discharge to home or other facility with appropriate resources  Outcome: Progressing   Discharge planning continues. Awaiting placement.

## 2022-04-23 NOTE — FLOWSHEET NOTE
04/23/22 1858   Vital Signs   /60   Pulse 63   Resp 18   Pain Assessment   Pain Assessment None - Denies Pain   Post-Hemodialysis Assessment   Post-Treatment Procedures Blood returned;Catheter capped, clamped with Citrate x 2 ports   Machine Disinfection Process Acid/Vinegar Clean;Heat Disinfect; Exterior Machine Disinfection   Total Liters Processed (l/min) 66.7 l/min   Dialyzer Clearance Lightly streaked   Duration of Treatment (minutes) 180 minutes   NET Removed (ml) 2000 ml   Tolerated Treatment Good   Interventions Taken Medication   Patient Response to Treatment Patient completed 3 hour HD treatment, patient received one dose of Albumin for BP support at the beginning of treatment, patient did not require any additional medications for BP support, HD CVC maintained good blood flow throughout entire treatment, HD CVC sodium citrate locked, post vitals stable,  post tx report given to Asya Su RN   Bilateral Breath Sounds Diminished   Edema Right lower extremity; Left lower extremity   RLE Edema +1   LLE Edema +1   Time Off 1855

## 2022-04-24 ENCOUNTER — APPOINTMENT (OUTPATIENT)
Dept: GENERAL RADIOLOGY | Age: 62
DRG: 637 | End: 2022-04-24
Payer: COMMERCIAL

## 2022-04-24 LAB
ABSOLUTE EOS #: 0.05 K/UL (ref 0–0.4)
ABSOLUTE LYMPH #: 0.32 K/UL (ref 1–4.8)
ABSOLUTE MONO #: 0.32 K/UL (ref 0.1–1.3)
ALLEN TEST: ABNORMAL
AMMONIA: 23 UMOL/L (ref 11–51)
ANION GAP SERPL CALCULATED.3IONS-SCNC: 11 MMOL/L (ref 9–17)
BASOPHILS # BLD: 0 % (ref 0–2)
BASOPHILS ABSOLUTE: 0 K/UL (ref 0–0.2)
BUN BLDV-MCNC: 15 MG/DL (ref 8–23)
CALCIUM SERPL-MCNC: 8.8 MG/DL (ref 8.6–10.4)
CARBOXYHEMOGLOBIN: 1.5 % (ref 0–5)
CHLORIDE BLD-SCNC: 96 MMOL/L (ref 98–107)
CO2: 25 MMOL/L (ref 20–31)
CREAT SERPL-MCNC: 2.61 MG/DL (ref 0.5–0.9)
EOSINOPHILS RELATIVE PERCENT: 1 % (ref 0–4)
FIO2: ABNORMAL
GFR AFRICAN AMERICAN: 22 ML/MIN
GFR NON-AFRICAN AMERICAN: 18 ML/MIN
GFR SERPL CREATININE-BSD FRML MDRD: ABNORMAL ML/MIN/{1.73_M2}
GLUCOSE BLD-MCNC: 138 MG/DL (ref 65–105)
GLUCOSE BLD-MCNC: 158 MG/DL (ref 65–105)
GLUCOSE BLD-MCNC: 203 MG/DL (ref 65–105)
GLUCOSE BLD-MCNC: 246 MG/DL (ref 65–105)
GLUCOSE BLD-MCNC: 247 MG/DL (ref 65–105)
GLUCOSE BLD-MCNC: 252 MG/DL (ref 70–99)
HCO3 ARTERIAL: 30.4 MMOL/L (ref 22–26)
HCT VFR BLD CALC: 29.2 % (ref 36–46)
HEMOGLOBIN: 9.6 G/DL (ref 12–16)
LYMPHOCYTES # BLD: 7 % (ref 24–44)
MCH RBC QN AUTO: 31 PG (ref 26–34)
MCHC RBC AUTO-ENTMCNC: 33.1 G/DL (ref 31–37)
MCV RBC AUTO: 93.9 FL (ref 80–100)
METHEMOGLOBIN: 0.8 % (ref 0–1.9)
MONOCYTES # BLD: 7 % (ref 1–7)
MORPHOLOGY: ABNORMAL
MORPHOLOGY: ABNORMAL
O2 DEVICE/FLOW/%: ABNORMAL
O2 SAT, ARTERIAL: 82.8 % (ref 95–98)
PATIENT TEMP: 37
PCO2 ARTERIAL: 45.9 MMHG (ref 35–45)
PDW BLD-RTO: 15.8 % (ref 11.5–14.9)
PH ARTERIAL: 7.43 (ref 7.35–7.45)
PLATELET # BLD: 120 K/UL (ref 150–450)
PMV BLD AUTO: 9.5 FL (ref 6–12)
PO2 ARTERIAL: 47.6 MMHG (ref 80–100)
POSITIVE BASE EXCESS, ART: 6.1 MMOL/L (ref 0–2)
POTASSIUM SERPL-SCNC: 4.6 MMOL/L (ref 3.7–5.3)
POTASSIUM SERPL-SCNC: 5.5 MMOL/L (ref 3.7–5.3)
POTASSIUM SERPL-SCNC: 5.6 MMOL/L (ref 3.7–5.3)
PT. POSITION: ABNORMAL
RBC # BLD: 3.11 M/UL (ref 4–5.2)
RESPIRATORY RATE: 20
SAMPLE SITE: ABNORMAL
SEG NEUTROPHILS: 85 % (ref 36–66)
SEGMENTED NEUTROPHILS ABSOLUTE COUNT: 3.81 K/UL (ref 1.3–9.1)
SODIUM BLD-SCNC: 132 MMOL/L (ref 135–144)
TEXT FOR RESPIRATORY: ABNORMAL
WBC # BLD: 4.5 K/UL (ref 3.5–11)

## 2022-04-24 PROCEDURE — 82805 BLOOD GASES W/O2 SATURATION: CPT

## 2022-04-24 PROCEDURE — 6370000000 HC RX 637 (ALT 250 FOR IP): Performed by: PSYCHIATRY & NEUROLOGY

## 2022-04-24 PROCEDURE — 6370000000 HC RX 637 (ALT 250 FOR IP): Performed by: FAMILY MEDICINE

## 2022-04-24 PROCEDURE — 85025 COMPLETE CBC W/AUTO DIFF WBC: CPT

## 2022-04-24 PROCEDURE — 80048 BASIC METABOLIC PNL TOTAL CA: CPT

## 2022-04-24 PROCEDURE — 82140 ASSAY OF AMMONIA: CPT

## 2022-04-24 PROCEDURE — 36415 COLL VENOUS BLD VENIPUNCTURE: CPT

## 2022-04-24 PROCEDURE — 82947 ASSAY GLUCOSE BLOOD QUANT: CPT

## 2022-04-24 PROCEDURE — 6370000000 HC RX 637 (ALT 250 FOR IP): Performed by: INTERNAL MEDICINE

## 2022-04-24 PROCEDURE — 2060000000 HC ICU INTERMEDIATE R&B

## 2022-04-24 PROCEDURE — 71045 X-RAY EXAM CHEST 1 VIEW: CPT

## 2022-04-24 PROCEDURE — 2580000003 HC RX 258: Performed by: STUDENT IN AN ORGANIZED HEALTH CARE EDUCATION/TRAINING PROGRAM

## 2022-04-24 PROCEDURE — 36600 WITHDRAWAL OF ARTERIAL BLOOD: CPT

## 2022-04-24 PROCEDURE — 99231 SBSQ HOSP IP/OBS SF/LOW 25: CPT | Performed by: FAMILY MEDICINE

## 2022-04-24 PROCEDURE — 84132 ASSAY OF SERUM POTASSIUM: CPT

## 2022-04-24 RX ADMIN — INSULIN GLARGINE 50 UNITS: 100 INJECTION, SOLUTION SUBCUTANEOUS at 08:11

## 2022-04-24 RX ADMIN — ASPIRIN 81 MG: 81 TABLET, COATED ORAL at 08:09

## 2022-04-24 RX ADMIN — SODIUM ZIRCONIUM CYCLOSILICATE 10 G: 5 POWDER, FOR SUSPENSION ORAL at 09:58

## 2022-04-24 RX ADMIN — SODIUM CHLORIDE, PRESERVATIVE FREE 10 ML: 5 INJECTION INTRAVENOUS at 20:44

## 2022-04-24 RX ADMIN — SODIUM ZIRCONIUM CYCLOSILICATE 5 G: 5 POWDER, FOR SUSPENSION ORAL at 14:46

## 2022-04-24 RX ADMIN — INSULIN LISPRO 6 UNITS: 100 INJECTION, SOLUTION INTRAVENOUS; SUBCUTANEOUS at 08:11

## 2022-04-24 RX ADMIN — BUSPIRONE HYDROCHLORIDE 7.5 MG: 5 TABLET ORAL at 20:42

## 2022-04-24 RX ADMIN — ANTI-FUNGAL POWDER MICONAZOLE NITRATE TALC FREE: 1.42 POWDER TOPICAL at 21:02

## 2022-04-24 RX ADMIN — SODIUM BICARBONATE 650 MG: 650 TABLET ORAL at 14:46

## 2022-04-24 RX ADMIN — CARVEDILOL 3.12 MG: 3.12 TABLET, FILM COATED ORAL at 20:42

## 2022-04-24 RX ADMIN — TRAZODONE HYDROCHLORIDE 50 MG: 50 TABLET ORAL at 20:42

## 2022-04-24 RX ADMIN — DOCUSATE SODIUM 100 MG: 100 CAPSULE, LIQUID FILLED ORAL at 08:09

## 2022-04-24 RX ADMIN — SODIUM ZIRCONIUM CYCLOSILICATE 5 G: 5 POWDER, FOR SUSPENSION ORAL at 20:42

## 2022-04-24 RX ADMIN — INSULIN LISPRO 6 UNITS: 100 INJECTION, SOLUTION INTRAVENOUS; SUBCUTANEOUS at 11:35

## 2022-04-24 RX ADMIN — GABAPENTIN 300 MG: 300 CAPSULE ORAL at 08:10

## 2022-04-24 RX ADMIN — CARVEDILOL 3.12 MG: 3.12 TABLET, FILM COATED ORAL at 08:10

## 2022-04-24 RX ADMIN — BUSPIRONE HYDROCHLORIDE 7.5 MG: 5 TABLET ORAL at 14:46

## 2022-04-24 RX ADMIN — BUSPIRONE HYDROCHLORIDE 7.5 MG: 5 TABLET ORAL at 08:10

## 2022-04-24 RX ADMIN — SODIUM BICARBONATE 650 MG: 650 TABLET ORAL at 20:42

## 2022-04-24 RX ADMIN — APIXABAN 5 MG: 5 TABLET, FILM COATED ORAL at 08:10

## 2022-04-24 RX ADMIN — SODIUM BICARBONATE 650 MG: 650 TABLET ORAL at 08:10

## 2022-04-24 RX ADMIN — INSULIN GLARGINE 50 UNITS: 100 INJECTION, SOLUTION SUBCUTANEOUS at 20:41

## 2022-04-24 RX ADMIN — RANOLAZINE 1000 MG: 1000 TABLET, FILM COATED, EXTENDED RELEASE ORAL at 08:10

## 2022-04-24 RX ADMIN — RANOLAZINE 1000 MG: 1000 TABLET, FILM COATED, EXTENDED RELEASE ORAL at 20:44

## 2022-04-24 RX ADMIN — FUROSEMIDE 80 MG: 40 TABLET ORAL at 08:10

## 2022-04-24 RX ADMIN — FEBUXOSTAT 40 MG: 40 TABLET, FILM COATED ORAL at 08:10

## 2022-04-24 RX ADMIN — GABAPENTIN 300 MG: 300 CAPSULE ORAL at 20:42

## 2022-04-24 RX ADMIN — INSULIN LISPRO 3 UNITS: 100 INJECTION, SOLUTION INTRAVENOUS; SUBCUTANEOUS at 16:42

## 2022-04-24 RX ADMIN — ATORVASTATIN CALCIUM 80 MG: 80 TABLET, FILM COATED ORAL at 08:10

## 2022-04-24 RX ADMIN — SODIUM CHLORIDE, PRESERVATIVE FREE 10 ML: 5 INJECTION INTRAVENOUS at 08:21

## 2022-04-24 RX ADMIN — APIXABAN 5 MG: 5 TABLET, FILM COATED ORAL at 20:42

## 2022-04-24 RX ADMIN — DOCUSATE SODIUM 100 MG: 100 CAPSULE, LIQUID FILLED ORAL at 20:42

## 2022-04-24 RX ADMIN — ANTI-FUNGAL POWDER MICONAZOLE NITRATE TALC FREE: 1.42 POWDER TOPICAL at 08:21

## 2022-04-24 RX ADMIN — PANTOPRAZOLE SODIUM 40 MG: 40 TABLET, DELAYED RELEASE ORAL at 06:13

## 2022-04-24 RX ADMIN — Medication 9 MG: at 20:42

## 2022-04-24 NOTE — PLAN OF CARE
Problem: Falls - Risk of:  Goal: Will remain free from falls  4/24/2022 0319 by Merna Peoples RN  Outcome: Progressing  Note: Pt remains free from falls this shift. Bed in low position with wheels locked and siderails x2. Call light and bedside table within reach. Assistive devices within reach. Bed alarm activated. Hourly rounding to ensure pt safety. Will continue to monitor. 4/23/2022 1800 by Brynn Montes De Oca RN  Outcome: Progressing  Goal: Absence of physical injury  4/24/2022 0319 by Merna Peoples RN  Outcome: Progressing  4/23/2022 1800 by Brynn Montes De Oca RN  Outcome: Progressing     Problem: Skin Integrity:  Goal: Will show no infection signs and symptoms  4/24/2022 0319 by Merna Peolpes RN  Outcome: Progressing  4/23/2022 1800 by Brynn Montes De Oca RN  Outcome: Progressing  Goal: Absence of new skin breakdown  4/24/2022 0319 by Merna Peoples RN  Outcome: Progressing  Note: Skin assessment as documented. No new breakdown noted this shift. Pt encouraged to reposition frequently. Zinc cream applied to coccyx as needed. Will continue to monitor.    4/23/2022 1800 by Brynn MontesD e Oca RN  Outcome: Progressing     Problem: Musculor/Skeletal Functional Status  Goal: Highest potential functional level  4/24/2022 0319 by Merna Peoples RN  Outcome: Progressing  4/23/2022 1800 by Brynn Montes De Oca RN  Outcome: Progressing  Goal: Absence of falls  4/24/2022 0319 by Merna Peoples RN  Outcome: Progressing  4/23/2022 1800 by Brynn Montes De Oca RN  Outcome: Progressing     Problem: Pain:  Goal: Pain level will decrease  4/24/2022 0319 by Merna Peoples RN  Outcome: Progressing  4/23/2022 1800 by Brynn Montes De Oca RN  Outcome: Progressing  Goal: Control of acute pain  4/24/2022 0319 by Merna Peoples RN  Outcome: Progressing  4/23/2022 1800 by Brynn Montes De Oca RN  Outcome: Progressing  Goal: Control of chronic pain  4/24/2022 0319 by Merna Peoples RN  Outcome: Progressing  4/23/2022 1800 by Brynn Montes De Oca RN  Outcome: Progressing     Problem: Discharge Planning  Goal: Discharge to home or other facility with appropriate resources  4/24/2022 0319 by Erick Fung RN  Outcome: Progressing  4/23/2022 1800 by Stephanie Olson RN  Outcome: Progressing     Problem: Chronic Conditions and Co-morbidities  Goal: Patient's chronic conditions and co-morbidity symptoms are monitored and maintained or improved  4/24/2022 0319 by Erick Fung RN  Outcome: Progressing  4/23/2022 1800 by Stephanie Olson RN  Outcome: Progressing     Problem: ABCDS Injury Assessment  Goal: Absence of physical injury  4/24/2022 0319 by Erick Fung RN  Outcome: Progressing  4/23/2022 1800 by Stephanie Olson RN  Outcome: Progressing

## 2022-04-24 NOTE — PROGRESS NOTES
1240: RN updated Dr Adela Farris that patient was having increased confusion as the day progress. Orders for ammonia level and ABGs to be drawn received. 1405: RN updated Dr Adela Farris with ammonia and ABG results. Dr Adela Farris stated to update pulm on results. 1420: RN updated Dr Wilfredo Mclaughlin with ammonia levels and ABG results. Reviewed that patient is currently 94% on 3L O2 and that 3L is the patient's baseline. Dr Wilfredo Mclaughlin stated that pulmonary would not be doing a further workup at this time. 1540: RN reached out to Dr Bambi Woods with neurology to update on increased confusion compared to yesterday. Dr Bambi Woods stated \"does not believe this is a neurological issue but a metabolic one and he would order a chest x-ray and would see the patient tomorrow\". 1420: RN updated Dr Adela Farris that pulm was consulted and updated Dr Wilfredo Mclaughlin on ABGs. Pulm is not doing anything at this time. Talked with neuro as well and they feel it is not neurological but will see patient Monday. Family is in the room and are looking for an answer to patient's confusion. Dr Adela Farris did not have any other orders to add at this time.

## 2022-04-24 NOTE — PROGRESS NOTES
Nephrology Progress Note    Subjective/   57y.o. year old female who we are seeing in consultation for end-stage renal disease on hemodialysis    Interval history:  Patient seen and examined today  Had dialysis yesterday with 2 L removed. Completed 3 hours of treatment- potassium was elevated at 5.6  Recheck 5.5 this morning. Dialysis catheter functioned well. History of Present Illness: This is a 57 y.o. female with hypertension, type 2 diabetes mellitus, end-stage renal disease on hemodialysis   who was recently taken off dialysis about 6 weeks ago due to regaining residual kidney function. Over the course of the last 2 weeks patient  was restarted on hemodialysis due to worsening fluid overload. Initially had problems with her  tunneled catheter which was replaced last week- patient was dialyzed on Saturday, 4/9/2021 and yesterday-4/13/22  at the outpatient dialysis unit. Patient presented with complaints of shortness of breath over the last 2 to 3 days progressively worsening. Patient had associated  Nausea, chest pain but no vomiting. She is on 3 L nasal cannula chronically at home. Patient was found to be in acute DKA with blood sugars greater than 500 was started on the modified DKA protocol. Blood pressures on admission were elevated above 200s and her chest x-ray showed evidence of pulmonary vascular congestion. Labs showed a potassium of 3.3 with BUN/creatinine of 18 and 1.78 mg/dL, bicarbonate of 18 and troponin of 78. Beta hydroxybutyrate was 7.57.   Objective/     Vitals:    04/23/22 2300 04/24/22 0500 04/24/22 0745 04/24/22 1215   BP: (!) 118/52  (!) 126/53 (!) 111/52   Pulse: 68  71 65   Resp: 16  14 16   Temp: 98.2 °F (36.8 °C)  99.2 °F (37.3 °C) 98.1 °F (36.7 °C)   TempSrc: Oral  Oral Oral   SpO2: 94%  95% 94%   Weight:  257 lb 8 oz (116.8 kg)     Height:         24HR INTAKE/OUTPUT:      Intake/Output Summary (Last 24 hours) at 4/24/2022 1341  Last data filed at 4/24/2022 4357  Gross per 24 hour   Intake 370 ml   Output --   Net 370 ml     Patient Vitals for the past 96 hrs (Last 3 readings):   Weight   04/24/22 0500 257 lb 8 oz (116.8 kg)   04/23/22 0300 258 lb 6.1 oz (117.2 kg)   04/22/22 0515 253 lb 8.5 oz (115 kg)       Constitutional: Alert and oriented x3  Cardiovascular:  S1, S2 without m/r/g  Respiratory: Diminished air entry  Abdomen: +bs, soft, nt  Ext: 1+ LE edema    Data/  Recent Labs     04/22/22  0537 04/23/22  0535 04/24/22  0517   WBC 4.6 4.3 4.5   HGB 9.8* 9.8* 9.6*   HCT 28.8* 28.6* 29.2*   MCV 93.4 93.3 93.9   * 126* 120*     Recent Labs     04/22/22  0537 04/22/22  0537 04/23/22  0535 04/24/22  0517 04/24/22  0910     --  134* 132*  --    K 4.3   < > 4.9 5.6* 5.5*   CL 98  --  97* 96*  --    CO2 26  --  26 25  --    GLUCOSE 55*  --  135* 252*  --    BUN 16  --  23 15  --    CREATININE 2.86*  --  3.66* 2.61*  --    LABGLOM 17*  --  12* 18*  --    GFRAA 20*  --  15* 22*  --     < > = values in this interval not displayed. Assessment/   1. End-stage renal disease on hemodialysis by way of a right-sided tunneled catheter, transiently taken off dialysis 6 weeks ago and re- started due to worsening fluid overload 1 week ago. Patient still makes urine proximately 400 to 500 cc daily     2. Hypertensive urgency- improving     3. Acute DKA currently on DKA protocol-resolved     4. Hypokalemia #2 to osmotic diuresis and  total body depletion-improved     5. CHF with diastolic dysfunction and evidence of fluid overload pulmonary vascular congestion on chest xray     6.  Urinary tract infection with pyuria     7. Thrombocytopenia-improving    8. Acute hyperkalemia  Plan/   HD TTS, hemodialysis as per schedule  Lokelma 5 g 3 times daily  2 g potassium diet and check potassium at 7 PM  Lasix 80 mg daily  Basic metabolic panel daily  Midodrine  and albumin with dialysis.       Viraj Desir MD    Nephrologist

## 2022-04-24 NOTE — PROGRESS NOTES
Pt states she has recently been having difficulty with swallowing pills, especially larger ones. Writer administered night time medications and split larger pills in half. Pt able to take them one at a time with sips of water. Will continue to monitor.

## 2022-04-24 NOTE — CARE COORDINATION
ONGOING DISCHARGE PLANNING NOTE:    Writer reviewed LSW notes, and discharge plan is for Pt. To go to ARU.      Pt. Has Been Denied, However, there will be a P2P on Monday.      [facility], is following, if not accepted at ARU.      Electronically signed by Mor Ramirez RN on 4/24/2022 at 9:01 AM

## 2022-04-24 NOTE — PROGRESS NOTES
Progress Note  Date:2022       Room:[address] [place] [address]  Patient Corinne Hunter     YOB: 1960     Age:62 y.o. Subjective    Subjective:  Symptoms:  (Patient sleeping comfortably. ). Diet:  Adequate intake. Activity level: Impaired due to weakness. Review of Systems  Objective         Vitals Last 24 Hours:  TEMPERATURE:  Temp  Av.2 °F (36.8 °C)  Min: 97.9 °F (36.6 °C)  Max: 98.6 °F (37 °C)  RESPIRATIONS RANGE: Resp  Av  Min: 16  Max: 20  PULSE OXIMETRY RANGE: SpO2  Av.5 %  Min: 94 %  Max: 98 %  PULSE RANGE: Pulse  Av.6  Min: 60  Max: 70  BLOOD PRESSURE RANGE: Systolic (92AZB), LGB:325 , Min:95 , CWJ:076   ; Diastolic (32ZOJ), MBS:10, Min:41, Max:78    I/O (24Hr): Intake/Output Summary (Last 24 hours) at 2022 0734  Last data filed at 2022  Gross per 24 hour   Intake 660 ml   Output --   Net 660 ml     Objective:  General Appearance:  Comfortable. Vital signs: (most recent): Blood pressure (!) 118/52, pulse 68, temperature 98.2 °F (36.8 °C), temperature source Oral, resp. rate 16, height 5' 5\" (1.651 m), weight 257 lb 8 oz (116.8 kg), SpO2 94 %. Vital signs are normal.    Heart: Normal rate. S1 normal.      Labs/Imaging/Diagnostics    Labs:  CBC:  Recent Labs     22  0537 22  0535 22  0517   WBC 4.6 4.3 4.5   RBC 3.09* 3.07* 3.11*   HGB 9.8* 9.8* 9.6*   HCT 28.8* 28.6* 29.2*   MCV 93.4 93.3 93.9   RDW 15.6* 15.8* 15.8*   * 126* 120*     CHEMISTRIES:  Recent Labs     22  0537 22  0535 22  0517    134* 132*   K 4.3 4.9 5.6*   CL 98 97* 96*   CO2 26 26 25   BUN 16 23 15   CREATININE 2.86* 3.66* 2.61*   GLUCOSE 55* 135* 252*     PT/INR:No results for input(s): PROTIME, INR in the last 72 hours. APTT:No results for input(s): APTT in the last 72 hours. LIVER PROFILE:No results for input(s): AST, ALT, BILIDIR, BILITOT, ALKPHOS in the last 72 hours.     Imaging Last 24 Hours:  No results found.  Assessment//Plan           Hospital Problems           Last Modified POA    * (Principal) Diabetic ketoacidosis without coma associated with type 2 diabetes mellitus ([facility] 4/16/2022 Yes    Atherosclerosis of coronary artery bypass graft of native heart without angina pectoris 4/13/2022 Yes    Chronic diastolic heart failure ([facility] 4/13/2022 Yes    Diabetic polyneuropathy associated with type 2 diabetes mellitus ([facility] 4/13/2022 Yes    Mixed hyperlipidemia 4/13/2022 Yes    DKA, type 2, not at goal [facility]) 4/19/2022 Yes    Essential hypertension 4/13/2022 Yes    Chronic ischemic heart disease 4/13/2022 Yes    Morbid obesity with BMI of 40.0-44.9, adult ([facility] 4/13/2022 Yes    Long-term memory loss 4/13/2022 Yes    Anxiety 4/13/2022 Yes    Chronic midline low back pain with bilateral sciatica 4/13/2022 Yes    End-stage renal disease ([facility] 4/13/2022 Yes    Hypokalemia 4/14/2022 Yes        Assessment & Plan  Awaiting placement    Electronically signed by Tonie Trammell MD on 4/24/22 at 7:34 AM EDT

## 2022-04-24 NOTE — PROGRESS NOTES
RN spoke with Dr Keri Molina on the phone to update that potassium level was 5.6 this am. Orders for potassium recheck received and if potassium is greater than 5.4 to give Lokelma 10g once.

## 2022-04-24 NOTE — FLOWSHEET NOTE
04/24/22 1455   Encounter Summary   Encounter Overview/Reason  Volunteer Encounter   Service Provided For: Patient   Referral/Consult From: Nhi   Last Encounter  04/24/22   Complexity of Encounter Low   Spiritual/Emotional needs   Type Spiritual Support   Rituals, Rites and Sacraments   Type Hindu Communion   Assessment/Intervention/Outcome   Intervention Prayer (assurance of)/[place]

## 2022-04-24 NOTE — PLAN OF CARE
Problem: Falls - Risk of:  Goal: Will remain free from falls  Description: Will remain free from falls  Outcome: Progressing     Problem: Musculor/Skeletal Functional Status  Goal: Highest potential functional level  Outcome: Progressing     Problem: Musculor/Skeletal Functional Status  Goal: Absence of falls  Outcome: Progressing     Problem: Pain:  Goal: Pain level will decrease  Description: Pain level will decrease  Outcome: Progressing     Problem: Discharge Planning  Goal: Discharge to home or other facility with appropriate resources  Outcome: Progressing     Patient without physical injury or falls this shift. Patient denies pain this shift. Patient repositioned and cleaned up with help of nursing staff this shift. Patient completes daily activities with help from nursing staff. Discharge planning continues, discharge placement to be determined.

## 2022-04-25 LAB
ABSOLUTE EOS #: 0.1 K/UL (ref 0–0.4)
ABSOLUTE LYMPH #: 0.4 K/UL (ref 1–4.8)
ABSOLUTE MONO #: 0.3 K/UL (ref 0.1–1.3)
ANION GAP SERPL CALCULATED.3IONS-SCNC: 12 MMOL/L (ref 9–17)
BACTERIA: NORMAL
BASOPHILS # BLD: 0 % (ref 0–2)
BASOPHILS ABSOLUTE: 0 K/UL (ref 0–0.2)
BILIRUBIN URINE: ABNORMAL
BUN BLDV-MCNC: 24 MG/DL (ref 8–23)
CALCIUM SERPL-MCNC: 9 MG/DL (ref 8.6–10.4)
CASTS UA: NORMAL /LPF
CHLORIDE BLD-SCNC: 96 MMOL/L (ref 98–107)
CO2: 27 MMOL/L (ref 20–31)
COLOR: ABNORMAL
CREAT SERPL-MCNC: 3.51 MG/DL (ref 0.5–0.9)
EOSINOPHILS RELATIVE PERCENT: 2 % (ref 0–4)
EPITHELIAL CELLS UA: NORMAL /HPF
GFR AFRICAN AMERICAN: 16 ML/MIN
GFR NON-AFRICAN AMERICAN: 13 ML/MIN
GFR SERPL CREATININE-BSD FRML MDRD: ABNORMAL ML/MIN/{1.73_M2}
GLUCOSE BLD-MCNC: 103 MG/DL (ref 65–105)
GLUCOSE BLD-MCNC: 109 MG/DL (ref 70–99)
GLUCOSE BLD-MCNC: 113 MG/DL (ref 65–105)
GLUCOSE BLD-MCNC: 129 MG/DL (ref 65–105)
GLUCOSE BLD-MCNC: 196 MG/DL (ref 65–105)
GLUCOSE BLD-MCNC: 97 MG/DL (ref 65–105)
GLUCOSE URINE: NEGATIVE
HCT VFR BLD CALC: 28.5 % (ref 36–46)
HEMOGLOBIN: 9.5 G/DL (ref 12–16)
KETONES, URINE: ABNORMAL
LEUKOCYTE ESTERASE, URINE: ABNORMAL
LYMPHOCYTES # BLD: 8 % (ref 24–44)
MCH RBC QN AUTO: 31 PG (ref 26–34)
MCHC RBC AUTO-ENTMCNC: 33.3 G/DL (ref 31–37)
MCV RBC AUTO: 93.2 FL (ref 80–100)
MONOCYTES # BLD: 8 % (ref 1–7)
NITRITE, URINE: NEGATIVE
PDW BLD-RTO: 15.7 % (ref 11.5–14.9)
PH UA: 5 (ref 5–8)
PHOSPHORUS: 4.2 MG/DL (ref 2.6–4.5)
PLATELET # BLD: 133 K/UL (ref 150–450)
PMV BLD AUTO: 9 FL (ref 6–12)
POTASSIUM SERPL-SCNC: 4.3 MMOL/L (ref 3.7–5.3)
PROTEIN UA: ABNORMAL
RBC # BLD: 3.06 M/UL (ref 4–5.2)
RBC UA: NORMAL /HPF
REASON FOR REJECTION: NORMAL
SEG NEUTROPHILS: 82 % (ref 36–66)
SEGMENTED NEUTROPHILS ABSOLUTE COUNT: 3.6 K/UL (ref 1.3–9.1)
SODIUM BLD-SCNC: 135 MMOL/L (ref 135–144)
SPECIFIC GRAVITY UA: 1.02 (ref 1–1.03)
TSH SERPL DL<=0.05 MIU/L-ACNC: 0.41 UIU/ML (ref 0.3–5)
TURBIDITY: CLEAR
URINE HGB: NEGATIVE
UROBILINOGEN, URINE: NORMAL
WBC # BLD: 4.4 K/UL (ref 3.5–11)
WBC UA: NORMAL /HPF
ZZ NTE CLEAN UP: ORDERED TEST: NORMAL
ZZ NTE WITH NAME CLEAN UP: SPECIMEN SOURCE: NORMAL

## 2022-04-25 PROCEDURE — 97530 THERAPEUTIC ACTIVITIES: CPT

## 2022-04-25 PROCEDURE — 6370000000 HC RX 637 (ALT 250 FOR IP): Performed by: PSYCHIATRY & NEUROLOGY

## 2022-04-25 PROCEDURE — 97535 SELF CARE MNGMENT TRAINING: CPT

## 2022-04-25 PROCEDURE — 84100 ASSAY OF PHOSPHORUS: CPT

## 2022-04-25 PROCEDURE — 84443 ASSAY THYROID STIM HORMONE: CPT

## 2022-04-25 PROCEDURE — 80048 BASIC METABOLIC PNL TOTAL CA: CPT

## 2022-04-25 PROCEDURE — 6370000000 HC RX 637 (ALT 250 FOR IP): Performed by: FAMILY MEDICINE

## 2022-04-25 PROCEDURE — 82947 ASSAY GLUCOSE BLOOD QUANT: CPT

## 2022-04-25 PROCEDURE — 2060000000 HC ICU INTERMEDIATE R&B

## 2022-04-25 PROCEDURE — 36415 COLL VENOUS BLD VENIPUNCTURE: CPT

## 2022-04-25 PROCEDURE — 99233 SBSQ HOSP IP/OBS HIGH 50: CPT | Performed by: PSYCHIATRY & NEUROLOGY

## 2022-04-25 PROCEDURE — 2580000003 HC RX 258: Performed by: STUDENT IN AN ORGANIZED HEALTH CARE EDUCATION/TRAINING PROGRAM

## 2022-04-25 PROCEDURE — 6370000000 HC RX 637 (ALT 250 FOR IP): Performed by: INTERNAL MEDICINE

## 2022-04-25 PROCEDURE — 95819 EEG AWAKE AND ASLEEP: CPT

## 2022-04-25 PROCEDURE — 81001 URINALYSIS AUTO W/SCOPE: CPT

## 2022-04-25 PROCEDURE — 85025 COMPLETE CBC W/AUTO DIFF WBC: CPT

## 2022-04-25 RX ADMIN — ATORVASTATIN CALCIUM 80 MG: 80 TABLET, FILM COATED ORAL at 08:33

## 2022-04-25 RX ADMIN — BUSPIRONE HYDROCHLORIDE 7.5 MG: 5 TABLET ORAL at 08:33

## 2022-04-25 RX ADMIN — SODIUM CHLORIDE, PRESERVATIVE FREE 10 ML: 5 INJECTION INTRAVENOUS at 21:38

## 2022-04-25 RX ADMIN — ANTI-FUNGAL POWDER MICONAZOLE NITRATE TALC FREE: 1.42 POWDER TOPICAL at 21:39

## 2022-04-25 RX ADMIN — ANTI-FUNGAL POWDER MICONAZOLE NITRATE TALC FREE: 1.42 POWDER TOPICAL at 07:50

## 2022-04-25 RX ADMIN — SODIUM BICARBONATE 650 MG: 650 TABLET ORAL at 08:33

## 2022-04-25 RX ADMIN — APIXABAN 5 MG: 5 TABLET, FILM COATED ORAL at 21:37

## 2022-04-25 RX ADMIN — FUROSEMIDE 80 MG: 40 TABLET ORAL at 08:33

## 2022-04-25 RX ADMIN — APIXABAN 5 MG: 5 TABLET, FILM COATED ORAL at 08:33

## 2022-04-25 RX ADMIN — RANOLAZINE 1000 MG: 1000 TABLET, FILM COATED, EXTENDED RELEASE ORAL at 21:40

## 2022-04-25 RX ADMIN — INSULIN GLARGINE 50 UNITS: 100 INJECTION, SOLUTION SUBCUTANEOUS at 21:40

## 2022-04-25 RX ADMIN — TRAZODONE HYDROCHLORIDE 50 MG: 50 TABLET ORAL at 21:38

## 2022-04-25 RX ADMIN — CARVEDILOL 3.12 MG: 3.12 TABLET, FILM COATED ORAL at 08:33

## 2022-04-25 RX ADMIN — PANTOPRAZOLE SODIUM 40 MG: 40 TABLET, DELAYED RELEASE ORAL at 06:26

## 2022-04-25 RX ADMIN — DOCUSATE SODIUM 100 MG: 100 CAPSULE, LIQUID FILLED ORAL at 08:33

## 2022-04-25 RX ADMIN — INSULIN LISPRO 2 UNITS: 100 INJECTION, SOLUTION INTRAVENOUS; SUBCUTANEOUS at 21:40

## 2022-04-25 RX ADMIN — ASPIRIN 81 MG: 81 TABLET, COATED ORAL at 08:33

## 2022-04-25 RX ADMIN — GABAPENTIN 300 MG: 300 CAPSULE ORAL at 21:37

## 2022-04-25 RX ADMIN — SODIUM BICARBONATE 650 MG: 650 TABLET ORAL at 21:37

## 2022-04-25 RX ADMIN — RANOLAZINE 1000 MG: 1000 TABLET, FILM COATED, EXTENDED RELEASE ORAL at 08:36

## 2022-04-25 RX ADMIN — BUSPIRONE HYDROCHLORIDE 7.5 MG: 5 TABLET ORAL at 21:37

## 2022-04-25 RX ADMIN — Medication 9 MG: at 21:37

## 2022-04-25 RX ADMIN — DOCUSATE SODIUM 100 MG: 100 CAPSULE, LIQUID FILLED ORAL at 21:37

## 2022-04-25 RX ADMIN — CARVEDILOL 3.12 MG: 3.12 TABLET, FILM COATED ORAL at 21:37

## 2022-04-25 RX ADMIN — GABAPENTIN 300 MG: 300 CAPSULE ORAL at 08:33

## 2022-04-25 RX ADMIN — FEBUXOSTAT 40 MG: 40 TABLET, FILM COATED ORAL at 08:36

## 2022-04-25 ASSESSMENT — PAIN SCALES - GENERAL: PAINLEVEL_OUTOF10: 2

## 2022-04-25 ASSESSMENT — PAIN DESCRIPTION - FREQUENCY: FREQUENCY: CONTINUOUS

## 2022-04-25 NOTE — PROGRESS NOTES
Occupational Therapy  Facility/Department: [facility]  Daily Treatment Note  NAME: Claude Cedillo  : 1960  MRN: [id number]    Date of Service: 2022    Discharge Recommendations:  Patient would benefit from continued therapy after discharge         Patient Diagnosis(es): The primary encounter diagnosis was Diabetic ketoacidosis without coma associated with type 2 diabetes mellitus ([facility], [address] A diagnosis of Dyspnea, unspecified type was also pertinent to this visit. Assessment    Activity Tolerance: Patient tolerated treatment well;Patient limited by fatigue;Patient limited by endurance  Discharge Recommendations: Patient would benefit from continued therapy after discharge      Plan   Plan  Times per Week: 4-6  Current Treatment Recommendations: Self-Care / ADL; Strengthening;ROM;Balance training;Functional mobility training; Endurance training;Pain management; Safety education & training;Patient/Caregiver education & training;Equipment evaluation, education, & procurement;Home management training     Subjective   Subjective  Subjective: \"I'd like to stand up. \"  Pain: No c/o  Cognition  Overall Cognitive Status: WFL (Recent MRI, blood work, test for UTI.)        Objective    Vitals     Bed Mobility Training  Bed Mobility Training: Yes  Overall Level of Assistance: Minimum assistance  Rolling: Stand-by assistance (Rolling to R side and L side for clothing management, and repositioning)  Supine to Sit: Moderate assistance (Requiring increased assistance and increased time this date.)  Sit to Supine: Minimum assistance (For repositioning of trunk only.)  Scooting: Contact-guard assistance (To reposition self up further towards head of bed. Requires increased time)  Balance  Sitting: Without support (Maintains sitting balance with SBA/Close SUP.  Tolerated unsupported sitting at EOB for 15-20min this session.)  Standing: With support (Jose x2, using rw for balance and support.)  Transfer Training  Transfer Training: Yes  Overall Level of Assistance: Minimum assistance;Assist X2 (Tolerating ~30-45 sec x1, 15 sec x2- tremors increased)   Interventions: Safety awareness training;Verbal cues; Visual cues  Sit to Stand: Minimum assistance;Assist X2  Stand to Sit: Contact-guard assistance  Gait  Distance (ft):  (Unable this date due to tremors.)  Assistive Device: Walker     ADL  Feeding: Setup  Grooming: Setup  UE Bathing: Stand by assistance  LE Bathing: Moderate assistance  UE Dressing: Stand by assistance  LE Dressing: Moderate assistance  Toileting: Moderate assistance  Additional Comments: ADL scores based on clinical reasoning and skilled observation unless otherwise noted. Pt currently limited due to decreased strength, balance, and activity tolerance impacting safety and independence with self care tasks. Patient Education  Education Given To: Patient; Family  Education Provided: Role of Therapy;Plan of Care;Transfer Training; Fall Prevention Strategies  Education Method: Demonstration;Verbal  Barriers to Learning: Cognition  Education Outcome: Verbalized understanding;Continued education needed    Goals  Short Term Goals  Time Frame for Short term goals: By discharge  Short Term Goal 1: Pt will complete lower body dressing/bathing/toileting with Supervision and Good safety   Short Term Goal 2: Pt will complete functional transfers/mobility during self care tasks wtih Supervision and Good safety  Short Term Goal 3: Pt will tolerate standing 5+ mintues during functional activity of choice with Supervision and Good safety while maintaining SpO2 above 90%  Short Term Goal 4: Pt will verbalize/demonstrate good understanding of home safety/fall prevention strategies to increase safety and independence with self care and mobility  Short Term Goal 5: Pt will participate in 15+ minutes of therapeutic exercises/functional activities to increase safety and independence with self care and mobility Therapy Time   Individual Concurrent Group Co-treatment   Time In 1418         Time Out 1456         Minutes [address]/JAYLIN

## 2022-04-25 NOTE — PROGRESS NOTES
Increase physical activity to 1 hour per day  Decrease salt, excess sugar, and processed foods, ideal plate should 1/2 fruit and vegetables, 1/4 healthy protein and 1/4 healthy starch (presumably whole grain, whole food)       Notified AFSHAN Martini, via vm per Dr Felicitas Plunkett upheld initial denial.  Appeal is available, and writer requested if pt would like to proceed with appeal.

## 2022-04-25 NOTE — PROGRESS NOTES
Physical Therapy  [address]   Physical Therapy Progress Note    Date: 22  Patient Name: Peterson Marte       Room: 3750/2701-62  MRN: [id number]   Account: [de-identified]   : 1960  (60 y.o.) Gender: female        Diagnosis: DKA  Past Medical History:  has a past medical history of Backache, unspecified, CHF (congestive heart failure) ([address] Chronic kidney disease, Coronary atherosclerosis of artery bypass graft, COVID, Cramp of limb, Gallstones, Hypertension, Insomnia, Pneumonia, Type II or unspecified type diabetes mellitus with renal manifestations, not stated as uncontrolled(250.40), Type II or unspecified type diabetes mellitus without mention of complication, not stated as uncontrolled, and Unspecified vitamin D deficiency. Past Surgical History:   has a past surgical history that includes Coronary artery bypass graft; Knee arthroscopy; Carpal tunnel release; Breast surgery; Tonsillectomy; Hand surgery; Ankle fracture surgery; Cholecystectomy, open (N/A); IR TUNNELED CVC PLACE WO SQ PORT/PUMP > 5 YEARS (2021); AV fistula creation (2021); Dialysis fistula creation (Left, 2021); and other surgical history (2022). Additional Pertinent Hx: The patient is a 57 y.o. female who presents to [facility] with complaints of shortness of breath. She has a known hx of CHF, DM2 and ESRD on hemodialysis. She states that the SOB has been progressively worsening over the past few days. SOB was not relieved by Dialysis treatment. Overall Orientation Status: Impaired  Restrictions/Precautions  Restrictions/Precautions: General Precautions; Fall Risk;Up as Tolerated  Required Braces or Orthoses?: Yes    Subjective: Patient laying in bed upon arrival, agreeable to therapy   Comments: DEDE Angel approved therapy; co-treat with Tomas Gunn.  Pateint with increase confusion this date        Pain Assessment: None - Denies Pain     Oxygen Therapy  O2 Device: Nasal cannula  O2 Flow Rate (L/min): 3 L/min          Bed Mobility:   Bed Mobility  Rolling: Minimal assistance  Supine to Sit: Moderate assistance  Sit to Supine: Minimal assistance  Scooting: Stand by assistance  Bed mobility  Scooting: Stand by assistance    Transfers:  Sit to Stand: Minimal Assistance;2 Person Assistance  Stand to sit: Minimal Assistance;2 Person Assistance     Stairs/Curb  Stairs?: No    EXERCISES    Other exercises?: Yes  Other exercises 1: bed mobility x2  Other exercises 2: STS x3  Other exercises 3: brief and purewick change   Other exercises 4: rolling L/R for brief placement   Other exercises 5: standing tolernace x3 >30 seconds d/t tremors in B LEs            Activity Tolerance: Patient tolerated treatment well,Patient limited by endurance,Treatment limited secondary to decreased cognition  PT Equipment Recommendations  Equipment Needed: No       Current Treatment Recommendations: Strengthening,Balance training,Functional mobility training,Transfer training,Endurance training,Gait training    Conditions Requiring Skilled Therapeutic Intervention  Body Structures, Functions, Activity Limitations Requiring Skilled Therapeutic Intervention: Decreased functional mobility ; Decreased strength;Decreased endurance;Decreased balance;Decreased posture  Treatment Diagnosis: Impaired functional mobility and endurance 2* fatigue and nausea associated with Diabetic Ketoacidosis  History: H/O CVA- was in ARU in Nov,Dec, of 2021 and continued OP Therapy; CHF, Type 2 DM, Hemodialysis on T/TH  Discharge Recommendations: Patient would benefit from continued therapy after discharge;Home with assist PRN;Outpatient PT    Goals  Short Term Goals  Time Frame for Short term goals: 5 days  Short term goal 1: pt to demo independent bed mobility   Short term goal 2: pt to perform all Transfers with Rollator (or RW), Mod I  Short term goal 3: pt to ambulate household distances of 54-65' with Rollator (or RW) to improve independence and safety with mobility.    Short term goal 4: pt to improve standing balance to good with Use of AD  Short term goal 5: pt to improve standing tolerance to 10 minutes to improve tolerance for daily activities and ADLs       04/25/22 1551   PT Individual Minutes   Time In 1418   Time Out 1456   Minutes 38       Electronically signed by Yolette Ingram PTA on 4/25/22 at 3:52 PM EDT none

## 2022-04-25 NOTE — PLAN OF CARE
Problem: Musculor/Skeletal Functional Status  Goal: Highest potential functional level  4/25/2022 0353 by Chaney Cowden, RN  Outcome: Not Progressing  4/24/2022 1800 by Gracia Rowe RN  Outcome: Progressing     Problem: Falls - Risk of:  Goal: Will remain free from falls  Description: Will remain free from falls  4/25/2022 0353 by Chaney Cowden, RN  Outcome: Progressing  4/24/2022 1800 by Gracia Rowe RN  Outcome: Progressing  Goal: Absence of physical injury  Description: Absence of physical injury  4/25/2022 0353 by Chaney Cowden, RN  Outcome: Progressing  4/24/2022 1800 by Gracia Roew RN  Outcome: Progressing     Problem: Skin Integrity:  Goal: Will show no infection signs and symptoms  Description: Will show no infection signs and symptoms  4/25/2022 0353 by Chaney Cowden, RN  Outcome: Progressing  4/24/2022 1800 by Gracia Rowe RN  Outcome: Progressing  Goal: Absence of new skin breakdown  Description: Absence of new skin breakdown  4/25/2022 0353 by Chaney Cowden, RN  Outcome: Progressing  4/24/2022 1800 by Gracia Rowe RN  Outcome: Progressing     Problem: Musculor/Skeletal Functional Status  Goal: Absence of falls  4/25/2022 0353 by Chaney Cowden, RN  Outcome: Progressing  4/24/2022 1800 by Gracia Rowe RN  Outcome: Progressing     Problem: Pain:  Goal: Pain level will decrease  Description: Pain level will decrease  4/25/2022 0353 by Chaney Cowden, RN  Outcome: Progressing  4/24/2022 1800 by Gracia Rowe RN  Outcome: Progressing  Goal: Control of acute pain  Description: Control of acute pain  4/25/2022 0353 by Chaney Cowden, RN  Outcome: Progressing  4/24/2022 1800 by Gracia Rowe RN  Outcome: Progressing  Goal: Control of chronic pain  Description: Control of chronic pain  4/25/2022 0353 by Chaney Cowden, RN  Outcome: Progressing  4/24/2022 1800 by Gracia Rowe RN  Outcome: Progressing     Problem: Discharge Planning  Goal: Discharge to home or other facility with appropriate resources  4/25/2022 0353 by Prosper Zamarripa RN  Outcome: Progressing  4/24/2022 1800 by Wendy Worthy RN  Outcome: Progressing     Problem: Chronic Conditions and Co-morbidities  Goal: Patient's chronic conditions and co-morbidity symptoms are monitored and maintained or improved  4/25/2022 0353 by Prosper Zamarripa RN  Outcome: Progressing  4/24/2022 1800 by Wendy Worthy RN  Outcome: Progressing     Problem: ABCDS Injury Assessment  Goal: Absence of physical injury  4/25/2022 0353 by Prosper Zamarripa RN  Outcome: Progressing  4/24/2022 1800 by Wendy Worthy RN  Outcome: Progressing

## 2022-04-25 NOTE — PROGRESS NOTES
[id number] [facility] Neurology   IN-PATIENT SERVICE      NEUROLOGY PROGRESS  NOTE            Date:   4/25/2022  Patient name:  Jacinta Ray  Date of admission:  4/13/2022  YOB: 1960      Interval History:     Patient reportedly has been more confused, saying words out of context and also increase of asterixis over the last 24 hours or so. Neurology has been reconsulted. ABG showed lower PO2 level but CO2 level is not significantly elevated. Ammonia level is within normal limits. Urinalysis has been sent out and is pending. Currently patient is awake and alert. She is able to answer some questions appropriately and other times will answer incorrectly. There does appear to be speech perseveration present. There is prominent negative myoclonus present in the upper and lower extremities which reportedly is worse than her baseline. There does appear to be a component of right hemineglect as well. History of Present Illness: The patient is a 57 y.o. female who presents with Shortness of Breath  . The patient was seen and examined and the chart was reviewed. Patient was initially being treated for AURELIA, CHF, lymphedema. Neurology was consulted due to episode of decreased responsiveness with blank staring. She then underwent MRI brain and MRA head and neck after this episode which not reveal any significant or acute abnormalities.     Past Medical History:     Past Medical History:   Diagnosis Date    Backache, unspecified     CHF (congestive heart failure) (HCC)     Chronic kidney disease     Coronary atherosclerosis of artery bypass graft     COVID 1/31/2022    Cramp of limb     Gallstones     Hypertension     Insomnia     Pneumonia     Type II or unspecified type diabetes mellitus with renal manifestations, not stated as uncontrolled(250.40)     Type II or unspecified type diabetes mellitus without mention of complication, not stated as uncontrolled     Unspecified vitamin D deficiency         Past Surgical History:     Past Surgical History:   Procedure Laterality Date    ANKLE FRACTURE SURGERY      AV FISTULA CREATION  2021    BREAST SURGERY      CARPAL TUNNEL RELEASE      CHOLECYSTECTOMY, OPEN N/A     CORONARY ARTERY BYPASS GRAFT      x3    DIALYSIS FISTULA CREATION Left 2021    LEFT AV FISTULA CREATION UPPER EXTREMITY performed by Raeann Casanova MD at [address] [facility] IR TUNNELED CATHETER PLACEMENT GREATER THAN 5 YEARS  2021    IR TUNNELED CATHETER PLACEMENT GREATER THAN 5 YEARS 2021 [facility] SPECIAL PROCEDURES    KNEE ARTHROSCOPY      right    OTHER SURGICAL HISTORY  2022    cvc exchange    TONSILLECTOMY          Medications during admission:      sodium zirconium cyclosilicate  5 g Oral TID    apixaban  5 mg Oral BID    insulin glargine  50 Units SubCUTAneous BID    insulin lispro  0-18 Units SubCUTAneous TID WC    insulin lispro  0-9 Units SubCUTAneous Nightly    furosemide  80 mg Oral Daily    aspirin  81 mg Oral Daily    sodium chloride flush  5-40 mL IntraVENous 2 times per day    ranolazine  1,000 mg Oral BID    busPIRone  7.5 mg Oral TID    pantoprazole  40 mg Oral QAM AC    atorvastatin  80 mg Oral Daily    febuxostat  40 mg Oral Daily    docusate sodium  100 mg Oral BID    [Held by provider] tamsulosin  0.4 mg Oral Daily    [Held by provider] insulin lispro  15 Units SubCUTAneous TID AC    sodium bicarbonate  650 mg Oral TID    traZODone  50 mg Oral Nightly    gabapentin  300 mg Oral BID    melatonin  9 mg Oral Nightly    carvedilol  3.125 mg Oral BID    sodium phosphate IVPB  10 mmol IntraVENous Once    miconazole   Topical BID         Physical Exam:   BP (!) 148/62   Pulse 70   Temp 99.1 °F (37.3 °C) (Skin)   Resp 16   Ht 5' 5\" (1.651 m)   Wt 257 lb 8 oz (116.8 kg)   SpO2 98%   BMI 42.85 kg/m²   Temp (24hrs), Av.7 °F (37.1 °C), Min:98.1 °F (36.7 °C), Max:99.1 °F (37.3 °C)          Neurological examination:    Mental status   Alert and oriented x 3 with repeated prompting; following all commands; speech is appropriate at times. There is also speech perseveration at times. Cranial nerves   II - visual fields intact to confrontation; pupils reactive  III, IV, VI - extraocular muscles intact; no CECELIA; no nystagmus; no ptosis   V - normal facial sensation                                                               VII - normal facial symmetry                                                             VIII - intact hearing                                                                             IX, X - symmetrical palate elevation                                               XI - symmetrical shoulder shrug                                                       XII - midline tongue without atrophy or fasciculation     Motor function   prominent negative myoclonus present in upper and lower extremities upon any type of movement. She is able to hold her arms antigravity bilaterally. Lower extremities 2/5 strength. Sensory function Intact to touch, pin, throughout     Cerebellar  unable to assess     Reflex function 2/4 symmetric throughout . Downgoing plantar response bilaterally.  (-)Meza's sign bilaterally    Gait                   unable to assess secondary to weakness             Diagnostics:      Laboratory Testing:  CBC:   Recent Labs     04/23/22  0535 04/24/22  0517 04/25/22  0523   WBC 4.3 4.5 4.4   HGB 9.8* 9.6* 9.5*   * 120* 133*     BMP:    Recent Labs     04/23/22  0535 04/23/22  0535 04/24/22  0517 04/24/22  0517 04/24/22  0910 04/24/22  1846 04/25/22  0617   *  --  132*  --   --   --  135   K 4.9   < > 5.6*   < > 5.5* 4.6 4.3   CL 97*  --  96*  --   --   --  96*   CO2 26  --  25  --   --   --  27   BUN 23  --  15  --   --   --  24*   CREATININE 3.66*  --  2.61*  --   --   --  3.51*   GLUCOSE 135*  --  252*  --   --   --  109*    < > = values in this interval not displayed. Lab Results   Component Value Date    CHOL 105 04/09/2015    LDLCHOLESTEROL 72 12/26/2012    HDL 43 04/09/2015    TRIG 168 04/09/2015    ALT 9 01/17/2022    AST 11 01/17/2022    TSH 1.02 02/12/2014    INR 0.9 11/10/2021    LABA1C 11.3 (H) 04/05/2022    LABMICR 538 12/26/2012    HUXDETQI27 459 12/26/2012       Imaging/Diagnostics:      Results for orders placed during the hospital encounter of 12/23/21    MRI CERVICAL SPINE WO CONTRAST    Narrative  EXAMINATION:  MRI OF THE CERVICAL SPINE WITHOUT CONTRAST 12/28/2021 4:43 pm    TECHNIQUE:  Multiplanar multisequence MRI of the cervical spine was performed without the  administration of intravenous contrast.    COMPARISON:  11/15/2021    HISTORY:  ORDERING SYSTEM PROVIDED HISTORY: leg weakness  TECHNOLOGIST PROVIDED HISTORY:  leg weakness  Reason for Exam: leg weakness  Additional signs and symptoms: patient complains of lower back pain    FINDINGS:  BONES/ALIGNMENT: There is straightening of the normal cervical lordosis. No  significant listhesis. Prior anterior fixation from C5-C7. Prior C6  corpectomy with interbody graft. There is associated hardware artifact. No  aggressive marrow signal abnormality. SPINAL CORD: No abnormal cord signal is seen. SOFT TISSUES: No paraspinal mass identified. 1.6 cm right thyroid nodule. C2-C3: There is no significant disc protrusion, spinal canal stenosis or  neural foraminal narrowing. C3-C4: There is no significant disc protrusion, spinal canal stenosis or  neural foraminal narrowing. C4-C5: No significant spinal canal stenosis. Uncovertebral facet DJD with  mild to moderate bilateral neural foraminal stenosis. C5-C6: Postoperative level without significant spinal canal stenosis. Uncovertebral facet DJD with moderate to severe bilateral neural foraminal  stenosis. C6-C7: Postoperative level without significant spinal canal or neural  foraminal stenosis.     C7-T1: There is no significant disc protrusion, spinal canal stenosis or  neural foraminal narrowing. Impression  1. Postoperative changes from C5-C7 without significant spinal canal stenosis. 2. Neural foraminal narrowing as above. 3. 1.6 cm right thyroid nodule. Ultrasound is recommended for further  characterization. RECOMMENDATIONS:  Managing Incidental Thyroid Nodule Detected at CT or MRI or US    1. Further evaluation by thyroid Ultrasound recommended for these incidental  nodules:    Patient Age 25 years or less - Nodule of any size    Patient Age 21-27 years old - Nodule 1 cm in size or greater    Patient Age 28 years or more - Nodule 1.5 cm in size or greater    2. Follow up thyroid ultrasound also recommend in these scenarios    - Solitary nodule with high risk imaging features (locally invasive nodule or  suspicious lymph nodes)    - Heterogeneous, enlarged thyroid gland.    - Increased uptake on PET    3.  NO further imaging is recommended in the following scenarios    - Any nodule not meeting above criteria. - Those patients with limited life expectancy or significant co-morbidities. Note: These recommendations do not apply to pts. w/ increased risk for  thyroid cancer or pts. with symptomatic thyroid disease. Reference: Recommendations for f/u of Incidental Thyroid Nodules (ITN) found  on CT, MR, NM and Extrathyroidal US are based upon the ACR white paper and  [facility] 3-tiered system for managing ITNs:J Am Toy Radiol. 2015 Feb;12(2):  143-50    Unavailable    Results for orders placed during the hospital encounter of 04/13/22    MRA HEAD WO CONTRAST    Narrative  EXAMINATION:  MRA OF THE HEAD WITHOUT CONTRAST; MRI OF THE BRAIN WITHOUT CONTRAST 4/18/2022  6:01 pm; 4/18/2022 5:15 pm:    TECHNIQUE:  MRA of the head was performed utilizing time-of-flight imaging with MIP  images.  No intravenous contrast was administered.; Multiplanar multisequence  MRI of the brain was performed without the administration of intravenous  contrast.    COMPARISON:  12/28/2021    HISTORY:  ORDERING SYSTEM PROVIDED HISTORY: double vision  TECHNOLOGIST PROVIDED HISTORY:  double vision  Reason for Exam: Pt c/o double vision and right arm tingling, had episode of  staring and unresponsiveness earlier this afternoon. Pt  has had multiple  Brain MRI's since Sept 2021. FINDINGS:    MRI BRAIN:    INTRACRANIAL STRUCTURES/VENTRICLES: No acute infarct or mass. Stable mild  chronic white matter microvascular ischemic changes. No mass effect or  midline shift. No evidence of an acute intracranial hemorrhage. The  ventricles and sulci are normal in size and configuration. The  sellar/suprasellar regions appear unremarkable. The normal signal voids  within the major intracranial vessels appear maintained. ORBITS: Bilateral intra-ocular lens implants. Otherwise, normal.    SINUSES: The visualized paranasal sinuses and mastoid air cells are well  aerated. BONES/SOFT TISSUES: The bone marrow signal intensity appears normal. The soft  tissues demonstrate no acute abnormality. MRA HEAD:    ANTERIOR CIRCULATION: The internal carotid arteries are normal in course and  caliber without focal stenosis. The anterior cerebral and middle cerebral  arteries demonstrate no focal stenosis. POSTERIOR CIRCULATION: The posterior cerebral arteries demonstrate no focal  stenosis. The vertebral and basilar arteries appear unremarkable. ANEURYSM: No intracranial aneurysm is seen. Impression  1. No acute intracranial abnormality. 2. Mild chronic white matter microvascular ischemic changes. 3. Normal MRA of the head. No results found for this or any previous visit. No results found for this or any previous visit.     Results for orders placed during the hospital encounter of 04/13/22    MRI BRAIN WO CONTRAST    Narrative  EXAMINATION:  MRA OF THE HEAD WITHOUT CONTRAST; MRI OF THE BRAIN WITHOUT CONTRAST 4/18/2022  6:01 pm; 4/18/2022 5:15 pm:    TECHNIQUE:  MRA of the head was performed utilizing time-of-flight imaging with MIP  images. No intravenous contrast was administered.; Multiplanar multisequence  MRI of the brain was performed without the administration of intravenous  contrast.    COMPARISON:  12/28/2021    HISTORY:  ORDERING SYSTEM PROVIDED HISTORY: double vision  TECHNOLOGIST PROVIDED HISTORY:  double vision  Reason for Exam: Pt c/o double vision and right arm tingling, had episode of  staring and unresponsiveness earlier this afternoon. Pt  has had multiple  Brain MRI's since Sept 2021. FINDINGS:    MRI BRAIN:    INTRACRANIAL STRUCTURES/VENTRICLES: No acute infarct or mass. Stable mild  chronic white matter microvascular ischemic changes. No mass effect or  midline shift. No evidence of an acute intracranial hemorrhage. The  ventricles and sulci are normal in size and configuration. The  sellar/suprasellar regions appear unremarkable. The normal signal voids  within the major intracranial vessels appear maintained. ORBITS: Bilateral intra-ocular lens implants. Otherwise, normal.    SINUSES: The visualized paranasal sinuses and mastoid air cells are well  aerated. BONES/SOFT TISSUES: The bone marrow signal intensity appears normal. The soft  tissues demonstrate no acute abnormality. MRA HEAD:    ANTERIOR CIRCULATION: The internal carotid arteries are normal in course and  caliber without focal stenosis. The anterior cerebral and middle cerebral  arteries demonstrate no focal stenosis. POSTERIOR CIRCULATION: The posterior cerebral arteries demonstrate no focal  stenosis. The vertebral and basilar arteries appear unremarkable. ANEURYSM: No intracranial aneurysm is seen. Impression  1. No acute intracranial abnormality. 2. Mild chronic white matter microvascular ischemic changes. 3. Normal MRA of the head. No results found for this or any previous visit.     No results found for this or any previous visit. Results for orders placed during the hospital encounter of 12/23/21    CT HEAD WO CONTRAST    Narrative  EXAMINATION:  CT OF THE HEAD WITHOUT CONTRAST  12/24/2021 2:40 am    TECHNIQUE:  CT of the head was performed without the administration of intravenous  contrast. Dose modulation, iterative reconstruction, and/or weight based  adjustment of the mA/kV was utilized to reduce the radiation dose to as low  as reasonably achievable. COMPARISON:  11/10/2021    HISTORY:  ORDERING SYSTEM PROVIDED HISTORY: AMS  TECHNOLOGIST PROVIDED HISTORY:    AMS  Reason for Exam: AMS    FINDINGS:  BRAIN/VENTRICLES: There is no acute intracranial hemorrhage, mass effect or  midline shift. No abnormal extra-axial fluid collection. The gray-white  differentiation is maintained without evidence of an acute infarct. There is  no evidence of hydrocephalus. Mild diffuse cerebral atrophy and chronic white  matter ischemic change. ORBITS: The visualized portion of the orbits demonstrate no acute  abnormality. Incidental note of focal posterior retinal calcifications on  the right not consistent with drusen. SINUSES: The visualized paranasal sinuses and mastoid air cells demonstrate  no acute abnormality. SOFT TISSUES/SKULL:  No acute abnormality of the visualized skull or soft  tissues. Impression  No acute intracranial abnormality. RECOMMENDATIONS:  Unavailable      I personally reviewed all of the above medications, clinical laboratory, imaging and other diagnostic tests. Impression:      1. Worsening confusion, speech perseveration and questionable right hemineglect; will rule out new stroke. Possible metabolic component  2. Worsening myoclonic jerks; ?  Etiology. Kidney function appears stable. Ammonia is normal.  No evidence of elevated CO2. Plan:      Repeat MRI brain without contrast to rule out new stroke.  EEG   Urinalysis is currently pending.  Will check TSH.   [facility] Pulmonology recommendations   Nephrology recommendations   Will follow        Electronically signed by Suzanne Jarvis DO on 4/25/2022 at 11:24 AM      Suzanne Jarvis, [address] [place] [address]

## 2022-04-25 NOTE — PLAN OF CARE
Problem: Falls - Risk of:  Goal: Will remain free from falls  Description: Will remain free from falls  4/25/2022 1710 by Kyree Alarcon RN  Outcome: Progressing  4/25/2022 0353 by Prosper Zamarripa RN  Outcome: Progressing  Goal: Absence of physical injury  Description: Absence of physical injury  4/25/2022 1710 by Kyree Alarcon RN  Outcome: Progressing  4/25/2022 0353 by Prosper Zamarripa RN  Outcome: Progressing     Problem: Skin Integrity:  Goal: Will show no infection signs and symptoms  Description: Will show no infection signs and symptoms  4/25/2022 1710 by Kyree Alarcon RN  Outcome: Progressing  4/25/2022 0353 by Prosper Zamarripa RN  Outcome: Progressing  Goal: Absence of new skin breakdown  Description: Absence of new skin breakdown  4/25/2022 1710 by Kyree Alarcon RN  Outcome: Progressing  4/25/2022 0353 by Prosper Zamarripa RN  Outcome: Progressing     Problem: Musculor/Skeletal Functional Status  Goal: Highest potential functional level  4/25/2022 1710 by Kyree Alarcon RN  Outcome: Progressing  4/25/2022 0353 by Prosper Zamarripa RN  Outcome: Not Progressing  Goal: Absence of falls  4/25/2022 1710 by Kyree Alarcon RN  Outcome: Progressing  4/25/2022 0353 by Prosper Zamarripa RN  Outcome: Progressing     Problem: Pain:  Goal: Pain level will decrease  Description: Pain level will decrease  4/25/2022 1710 by Kyree Alarcon RN  Outcome: Progressing  4/25/2022 0353 by Prosper Zamarripa RN  Outcome: Progressing  Goal: Control of acute pain  Description: Control of acute pain  4/25/2022 1710 by Kyree Alarcon RN  Outcome: Progressing  4/25/2022 0353 by Prosper Zamarripa RN  Outcome: Progressing  Goal: Control of chronic pain  Description: Control of chronic pain  4/25/2022 1710 by Kyree Alarcon RN  Outcome: Progressing  4/25/2022 0353 by Prosper Zamarripa RN  Outcome: Progressing     Problem: Discharge Planning  Goal: Discharge to home or other facility with appropriate resources  4/25/2022 1710 by Tim Zamora RN  Outcome: Progressing  4/25/2022 0353 by Meredith Curry RN  Outcome: Progressing     Problem: Chronic Conditions and Co-morbidities  Goal: Patient's chronic conditions and co-morbidity symptoms are monitored and maintained or improved  4/25/2022 1710 by Tim Zamora RN  Outcome: Progressing  4/25/2022 0353 by Meredith Curry RN  Outcome: Progressing     Problem: ABCDS Injury Assessment  Goal: Absence of physical injury  4/25/2022 1710 by Tim Zamora RN  Outcome: Progressing  4/25/2022 0353 by Meredith Curry RN  Outcome: Progressing

## 2022-04-25 NOTE — PROGRESS NOTES
Nephrology Progress Note    Reason for consultation: Management of hemodialysis dependent end-stage renal disease. Consulting physician: Dickson Prieto MD    Interval history: Patient was seen and examined today and she complains of insomnia. She does not have shortness of breath or chest pain. History of Present Illness: This is a 57 y.o. female with hypertension, type 2 diabetes mellitus, end-stage renal disease on hemodialysis   who was recently taken off dialysis about 6 weeks ago due to regaining residual kidney function. Over the course of the last 2 weeks patient  was restarted on hemodialysis due to worsening fluid overload. Initially had problems with her  tunneled catheter which was replaced last week- patient was dialyzed on Saturday, 4/9/2021 and yesterday-4/13/22  at the outpatient dialysis unit. Patient presented with complaints of shortness of breath over the last 2 to 3 days progressively worsening. Patient had associated  Nausea, chest pain but no vomiting. She is on 3 L nasal cannula chronically at home. Patient was found to be in acute DKA with blood sugars greater than 500 was started on the modified DKA protocol. Blood pressures on admission were elevated above 200s and her chest x-ray showed evidence of pulmonary vascular congestion. Labs showed a potassium of 3.3 with BUN/creatinine of 18 and 1.78 mg/dL, bicarbonate of 18 and troponin of 78. Beta hydroxybutyrate was 7.57.     Objective/     Vitals:    04/24/22 1215 04/24/22 1945 04/25/22 0100 04/25/22 0700   BP: (!) 111/52 (!) 147/65 (!) 137/58 (!) 148/62   Pulse: 65 72 69 70   Resp: 16 16 16    Temp: 98.1 °F (36.7 °C) 98.4 °F (36.9 °C) 99 °F (37.2 °C) 99.1 °F (37.3 °C)   TempSrc: Oral Oral Axillary Skin   SpO2: 94% 93% 97% 98%   Weight:       Height:         24HR INTAKE/OUTPUT:      Intake/Output Summary (Last 24 hours) at 4/25/2022 8135  Last data filed at 4/24/2022 2112  Gross per 24 hour   Intake 960 ml   Output --   Net 960 ml     Patient Vitals for the past 96 hrs (Last 3 readings):   Weight   04/24/22 0500 257 lb 8 oz (116.8 kg)   04/23/22 0300 258 lb 6.1 oz (117.2 kg)   04/22/22 0515 253 lb 8.5 oz (115 kg)     Constitutional: Alert and oriented x3  Cardiovascular:  S1, S2 without pericardial rub or gallop. Respiratory: Diminished breath sounds at lung bases but no rhonchi. Abdomen: Full, soft with normal bowel sounds. Ext: 1+ LE edema    Data/  Recent Labs     04/23/22  0535 04/24/22  0517 04/25/22  0523   WBC 4.3 4.5 4.4   HGB 9.8* 9.6* 9.5*   HCT 28.6* 29.2* 28.5*   MCV 93.3 93.9 93.2   * 120* 133*     Recent Labs     04/23/22  0535 04/23/22  0535 04/24/22  0517 04/24/22  0517 04/24/22  0910 04/24/22  1846 04/25/22  0617   *  --  132*  --   --   --  135   K 4.9   < > 5.6*   < > 5.5* 4.6 4.3   CL 97*  --  96*  --   --   --  96*   CO2 26  --  25  --   --   --  27   GLUCOSE 135*  --  252*  --   --   --  109*   BUN 23  --  15  --   --   --  24*   CREATININE 3.66*  --  2.61*  --   --   --  3.51*   LABGLOM 12*  --  18*  --   --   --  13*   GFRAA 15*  --  22*  --   --   --  16*    < > = values in this interval not displayed. Assessment/Plan:     1. End-stage renal disease - on hemodialysis by way of a right-sided tunneled catheter, transiently taken off dialysis 6 weeks ago and re- started due to worsening fluid overload 1 week ago. She still has some residual renal function [500 mL urine per day]. We will place on a TTS hemodialysis schedule. We will continue as needed midodrine and albumin with dialysis. Renal diet,i.e 2-gram sodium,2-gram potassium,1500 ml fluid restriction,1-gram phosphorus, 1800 KCal and 1.2 gram protein per day.     2. Systemic hypertension - blood pressure is adequately controlled.     3. Diabetic ketoacidosis - resolved.     4.  Normocytic anemia of chronic kidney disease - hemoglobin today 9.5 g/dL.     5.  Mineral and bone disease profile - we will check serum phosphorus level tomorrow. Prognosis is guarded.     Taylor Bruce FACP  Attending Clinical Nephrologist

## 2022-04-25 NOTE — PROGRESS NOTES
Progress Note  Date:2022       [facility]:[id number]  Patient Ray Dial     YOB: 1960     Age:62 y.o. Subjective    Subjective:  Symptoms:  (Confused since yesterday afternoon - dropping her food, not able to hold her cup appropriately. ABG obtained yesterday and pulmonology informed of results; neurology also informed yesterday and they will see patient today. ). Diet:  Adequate intake. Activity level: Impaired due to weakness. Pain:  She reports no pain. Review of Systems  Objective         Vitals Last 24 Hours:  TEMPERATURE:  Temp  Av.8 °F (37.1 °C)  Min: 98.1 °F (36.7 °C)  Max: 99.2 °F (37.3 °C)  RESPIRATIONS RANGE: Resp  Avg: 15.5  Min: 14  Max: 16  PULSE OXIMETRY RANGE: SpO2  Av.4 %  Min: 93 %  Max: 98 %  PULSE RANGE: Pulse  Av.4  Min: 65  Max: 72  BLOOD PRESSURE RANGE: Systolic (15WAN), NHD:368 , Min:111 , NQW:098   ; Diastolic (06UZZ), FBA:88, Min:52, Max:65    I/O (24Hr): Intake/Output Summary (Last 24 hours) at 2022 0744  Last data filed at 2022 2112  Gross per 24 hour   Intake 970 ml   Output --   Net 970 ml     Objective:  General Appearance:  Comfortable. Vital signs: (most recent): Blood pressure (!) 148/62, pulse 70, temperature 99.1 °F (37.3 °C), temperature source Skin, resp. rate 16, height 5' 5\" (1.651 m), weight 257 lb 8 oz (116.8 kg), SpO2 98 %. (BP slightly elevated). Neurological: Patient is alert. (Dropping food).       Labs/Imaging/Diagnostics    Labs:  CBC:  Recent Labs     22  0535 22  0517 22  0523   WBC 4.3 4.5 4.4   RBC 3.07* 3.11* 3.06*   HGB 9.8* 9.6* 9.5*   HCT 28.6* 29.2* 28.5*   MCV 93.3 93.9 93.2   RDW 15.8* 15.8* 15.7*   * 120* 133*     CHEMISTRIES:  Recent Labs     22  0535 22  0535 22  0517 22  0517 22  0910 22  1846 22  0617   *  --  132*  --   --   --  135   K 4.9   < > 5.6*   < > 5.5* 4.6 4.3   CL 97*  --  96*  -- --   --  96*   CO2 26  --  25  --   --   --  27   BUN 23  --  15  --   --   --  24*   CREATININE 3.66*  --  2.61*  --   --   --  3.51*   GLUCOSE 135*  --  252*  --   --   --  109*    < > = values in this interval not displayed. PT/INR:No results for input(s): PROTIME, INR in the last 72 hours. APTT:No results for input(s): APTT in the last 72 hours. LIVER PROFILE:No results for input(s): AST, ALT, BILIDIR, BILITOT, ALKPHOS in the last 72 hours. Imaging Last 24 Hours:  XR CHEST PORTABLE    Result Date: 4/24/2022  EXAMINATION: ONE XRAY VIEW OF THE CHEST 4/24/2022 4:53 pm COMPARISON: Chest 04/19/2022 HISTORY: ORDERING SYSTEM PROVIDED HISTORY: Altered mental status, hypoxia, short of breath TECHNOLOGIST PROVIDED HISTORY: ams, hypoxia Reason for Exam: AMS, hypoxia FINDINGS: The cardiac silhouette is enlarged. Calcifications involving the aorta reflect atherosclerosis. The mediastinal and hilar silhouettes appear unremarkable. Vascular engorgement and cephalization is demonstrated with bilateral peribronchial cuffing and perivascular haziness. No dense consolidative opacity evident. No pleural effusion is seen. No pneumothorax is seen. No acute osseous abnormality is identified. Stable sequela from CABG. Right IJ hemodialysis catheter tips overlie the distal superior vena cava level. Wires and cardiac leads overlying the chest could obscure an underlying finding. 1. Mild congestive heart failure is most likely given the radiographic findings; pneumonia is also a consideration in areas of consolidation with pleural effusion. 2. Calcific atherosclerosis aorta. 3. Cardiomegaly.      Assessment//Plan           Hospital Problems           Last Modified POA    * (Principal) Diabetic ketoacidosis without coma associated with type 2 diabetes mellitus ([facility] [address] 4/16/2022 Yes    Atherosclerosis of coronary artery bypass graft of native heart without angina pectoris 4/13/2022 Yes    Chronic diastolic heart failure ([facility] [address] 4/13/2022 Yes    Diabetic polyneuropathy associated with type 2 diabetes mellitus ([facility] [address] 4/13/2022 Yes    Mixed hyperlipidemia 4/13/2022 Yes    DKA, type 2, not at goal [facility]) 4/19/2022 Yes    Essential hypertension 4/13/2022 Yes    Chronic ischemic heart disease 4/13/2022 Yes    Morbid obesity with BMI of 40.0-44.9, adult ([facility] [address] 4/13/2022 Yes    Long-term memory loss 4/13/2022 Yes    Anxiety 4/13/2022 Yes    Chronic midline low back pain with bilateral sciatica 4/13/2022 Yes    End-stage renal disease ([facility] [address] 4/13/2022 Yes    Hypokalemia 4/14/2022 Yes        Assessment & Plan  Check UA     Electronically signed by Thad Ji MD on 4/25/22 at 7:44 AM EDT

## 2022-04-25 NOTE — PROGRESS NOTES
p2p with Dr Can Parekh PT and OT needs and medical needs including dialysis, hyperkalemia, diabetes, etc.  Denial upheld- notes can appeal if wanted. Left message for Jennifer.

## 2022-04-25 NOTE — FLOWSHEET NOTE
04/25/22 1609   Encounter Summary   Encounter Overview/Reason  Volunteer Encounter   Service Provided For: Patient   Referral/Consult From: Nhi   Last Encounter  04/25/22   Complexity of Encounter Low   Spiritual/Emotional needs   Type Spiritual Support   Rituals, Rites and Sacraments   Type Jainism Communion   Assessment/Intervention/Outcome   Intervention Prayer (assurance of)/[place]

## 2022-04-25 NOTE — PROGRESS NOTES
Jason Bhatt MD/Carter Landa MD/ Mary Jane Cook MD/Dr Dante Kilpatrick APRN AGACNP-BC, NP-C      Gigi Socrateshi APRN NP-C     Latonya Qiu APRN NP-C                                           Pulmonary Progress Note    Patient - Freeman Montes De Oca   Age - 57 y.o.   - 1960  MRN - [id number]  Acct # - [de-identified]  Date of Admission - 2022  3:17 PM    Consulting Service/Physician:       Primary Care Physician: Carlito Smith, APRN - CNP    SUBJECTIVE:     Chief Complaint:   Chief Complaint   Patient presents with    Shortness of Breath     Subjective:    Wilman Carver states she is doing okay today. She feels like she is slightly confused. She denies any shortness of breath. She is currently on 3 L nasal cannula pulse ox 96%. She denies a cough. AB week G was obtained and likely a mixed specimen. pH was 7.429, CO2 45, O2 47. She is a Tuesday, Thursday, Saturday dialysis patient. She is typically on 2 L nasal cannula. Chest x-ray yesterday showed some mild fluid overload. VITALS  BP (!) 148/62   Pulse 70   Temp 99.1 °F (37.3 °C) (Skin)   Resp 16   Ht 5' 5\" (1.651 m)   Wt 257 lb 8 oz (116.8 kg)   SpO2 98%   BMI 42.85 kg/m²   Wt Readings from Last 3 Encounters:   22 257 lb 8 oz (116.8 kg)   22 248 lb (112.5 kg)   22 250 lb (113.4 kg)     I/O (24 Hours)    Intake/Output Summary (Last 24 hours) at 2022 0836  Last data filed at 2022 2112  Gross per 24 hour   Intake 960 ml   Output --   Net 960 ml     Ventilator:      Exam:   Physical Exam   Constitutional: Obese female lying in bed in no acute distress  HENT: Unremarkable  Head: Normocephalic and atraumatic. Eyes: EOM are normal. Pupils are equal, round, and reactive to light. Neck: Neck supple. Cardiovascular:  Regular rate and rhythm. Normal heart tones. No JVD.     Pulmonary/Chest: Respirations even and unlabored, lungs diminished at the bases, on 3 L nasal cannula pulse ox 96%. Abdominal: Soft. Bowel sounds are normal.  Musculoskeletal: Normal range of motion. Neurological: Patient is alert and oriented to person, place, and time. She does have abnormal movements to her arms which seem to be exacerbated when you bring attention to them. Hand grasp are normal bilaterally. She is able to follow commands. Skin: Skin is warm and dry. No rash noted.    Extremities: Trace edema   Infusions:      sodium chloride 25 mL (04/20/22 1212)     Meds:     Current Facility-Administered Medications:     sodium zirconium cyclosilicate (LOKELMA) oral suspension 5 g, 5 g, Oral, TID, Juan Jose Streeter MD, 5 g at 04/24/22 2042    apixaban (ELIQUIS) tablet 5 mg, 5 mg, Oral, BID, Chase Fox MD, 5 mg at 04/25/22 1072    insulin glargine (LANTUS) injection vial 50 Units, 50 Units, SubCUTAneous, BID, Yolanda Brunner MD, 50 Units at 04/24/22 2041    insulin lispro (HUMALOG) injection vial 0-18 Units, 0-18 Units, SubCUTAneous, TID WC, Yolanda Brunner MD, 3 Units at 04/24/22 1642    insulin lispro (HUMALOG) injection vial 0-9 Units, 0-9 Units, SubCUTAneous, Nightly, Yolanda Brunner MD, 3 Units at 04/22/22 2142    calcium carbonate (TUMS) chewable tablet 500 mg, 500 mg, Oral, TID PRN, Yolanda Brunner MD, 500 mg at 04/23/22 1037    furosemide (LASIX) tablet 80 mg, 80 mg, Oral, Daily, Juan Jose Streeter MD, 80 mg at 04/25/22 1977    polyethylene glycol (GLYCOLAX) packet 17 g, 17 g, Oral, Daily PRN, Chase Fox MD, 17 g at 04/23/22 1038    aspirin EC tablet 81 mg, 81 mg, Oral, Daily, Viki Escobar MD, 81 mg at 04/25/22 9283    sodium chloride flush 0.9 % injection 5-40 mL, 5-40 mL, IntraVENous, 2 times per day, Rinda Peppers, DO, 10 mL at 04/24/22 2044    sodium chloride flush 0.9 % injection 5-40 mL, 5-40 mL, IntraVENous, PRN, Rinda Peppers, DO    0.9 % sodium chloride infusion, , IntraVENous, PRN, Rinda Peppers, DO, Last Rate: 25 mL/hr at 04/20/22 1212, 25 mL at 04/20/22 1212    ondansetron (ZOFRAN-ODT) disintegrating tablet 4 mg, 4 mg, Oral, Q8H PRN **OR** ondansetron (ZOFRAN) injection 4 mg, 4 mg, IntraVENous, Q6H PRN, DO Rahul Sueolazine [facility]) extended release tablet 1,000 mg, 1,000 mg, Oral, BID, Ilda Hooper MD, 1,000 mg at 04/24/22 2044    busPIRone (BUSPAR) tablet 7.5 mg, 7.5 mg, Oral, TID, Ilda Hooper MD, 7.5 mg at 04/25/22 0833    pantoprazole (PROTONIX) tablet 40 mg, 40 mg, Oral, QAM AC, Ilda Hooper MD, 40 mg at 04/25/22 1508    atorvastatin (LIPITOR) tablet 80 mg, 80 mg, Oral, Daily, Ilda Hooper MD, 80 mg at 04/25/22 8228    febuxostat (ULORIC) tablet 40 mg, 40 mg, Oral, Daily, Ilda Hooper MD, 40 mg at 04/24/22 0810    docusate sodium (COLACE) capsule 100 mg, 100 mg, Oral, BID, Ilda Hooper MD, 100 mg at 04/25/22 5334    [Held by provider] tamsulosin (FLOMAX) capsule 0.4 mg, 0.4 mg, Oral, Daily, Ilda Hooper MD, 0.4 mg at 04/18/22 0815    [Held by provider] insulin lispro (HUMALOG) injection vial 15 Units, 15 Units, SubCUTAneous, TID AC, Ilda Hooper MD, 15 Units at 04/16/22 1713    sodium bicarbonate tablet 650 mg, 650 mg, Oral, TID, Ilda Hooper MD, 650 mg at 04/25/22 0438    traZODone (DESYREL) tablet 50 mg, 50 mg, Oral, Nightly, Ilda Hooper MD, 50 mg at 04/24/22 2042    gabapentin (NEURONTIN) capsule 300 mg, 300 mg, Oral, BID, Ilda Hooper MD, 300 mg at 04/25/22 3659    melatonin tablet 9 mg, 9 mg, Oral, Nightly, Ilda Hooper MD, 9 mg at 04/24/22 2042    lidocaine-prilocaine (EMLA) cream, , Topical, PRN, Ilda Hooper MD    carvedilol (COREG) tablet 3.125 mg, 3.125 mg, Oral, BID, Ilda Hooper MD, 3.125 mg at 04/25/22 0580    sodium phosphate 10 mmol in dextrose 5 % 250 mL IVPB, 10 mmol, IntraVENous, Once, Fauzia Carter MD    anticoagulant sodium citrate 4 % injection 1.6 mL, 1.6 mL, IntraCATHeter, PRN, Karen Parra MD Jacobo, 1.6 mL at 04/23/22 1900    anticoagulant sodium citrate 4 % injection 1.6 mL, 1.6 mL, IntraCATHeter, PRN, Giovanna Fletcher MD, 1.6 mL at 04/23/22 1900    glucose (GLUTOSE) 40 % oral gel 15 g, 15 g, Oral, PRN, Fidelia Ball MD    dextrose 50 % IV solution, 12.5 g, IntraVENous, PRN, Fidelia Ball MD    glucagon (rDNA) injection 1 mg, 1 mg, IntraMUSCular, PRN, Fidelia Ball MD    miconazole (MICOTIN) 2 % powder, , Topical, BID, Fidelia Ball MD, Given at 04/25/22 0750    acetaminophen (TYLENOL) tablet 650 mg, 650 mg, Oral, Q4H PRN, Dareen Zaina, DO, 650 mg at 04/20/22 1751    Lab Results:     Lab Results   Component Value Date    WBC 4.4 04/25/2022    HGB 9.5 (L) 04/25/2022    HCT 28.5 (L) 04/25/2022    MCV 93.2 04/25/2022     (L) 04/25/2022     Lab Results   Component Value Date    CALCIUM 9.0 04/25/2022     04/25/2022    K 4.3 04/25/2022    CO2 27 04/25/2022    CL 96 (L) 04/25/2022    BUN 24 (H) 04/25/2022    CREATININE 3.51 (H) 04/25/2022       Lab Results   Component Value Date    INR 0.9 11/10/2021    PROTIME 12.7 11/10/2021       Radiology:     4/24/2022 4/19/2022    My reading of film: Vascular congestion. ASSESSMENT:         1. RLL pneumonia/atelectasis versus fluid volume excess  2. DKA - resolved  3. Altered mental status  4. HTN urgency  5. UTI-completed ceftriaxone, culture with no signficant growth  6. ESRD on hemodialysis, TTS  7. Acute on Chronic diastolic heart failure-Grade 1  8. Syncopal episode   9. Chronic hypoxic respiratory insufficiency-on 2 L chronically  10. ERICK (clinical diagnosis)  11. Obesity  12. Full code    PLAN:   1. Neurology has been consulted per primary  2. Wean oxygen to keep pulse ox above 91%  3. Due for dialysis tomorrow, may benefit from some excess fluid removal  4. Would benefit from outpatient sleep study  5. CO2 normal on blood gas  6. UA ordered per primary and pending  7.  Unable to follow-up with our office outpatient secondary to insurance, would benefit from outpatient follow-up with [address] [place] [address] pulmonology after discharge      Electronically signed by AFSANEH Elam CNP on 04/25/22     This progress note was completed using a voice transcription system. Every effort was made to ensure accuracy. However, inadvertent computerized transcription errors may be present.     Kevin Regan, NP-C, MSN  [facility] Pulmonary, Critical Care & Sleep

## 2022-04-26 ENCOUNTER — APPOINTMENT (OUTPATIENT)
Dept: GENERAL RADIOLOGY | Age: 62
DRG: 637 | End: 2022-04-26
Payer: COMMERCIAL

## 2022-04-26 ENCOUNTER — APPOINTMENT (OUTPATIENT)
Dept: MRI IMAGING | Age: 62
DRG: 637 | End: 2022-04-26
Payer: COMMERCIAL

## 2022-04-26 PROBLEM — E11.22 TYPE 2 DIABETES MELLITUS WITH CHRONIC KIDNEY DISEASE ON CHRONIC DIALYSIS, WITH LONG-TERM CURRENT USE OF INSULIN (HCC): Status: ACTIVE | Noted: 2018-09-20

## 2022-04-26 PROBLEM — Z99.2 TYPE 2 DIABETES MELLITUS WITH CHRONIC KIDNEY DISEASE ON CHRONIC DIALYSIS, WITH LONG-TERM CURRENT USE OF INSULIN (HCC): Status: ACTIVE | Noted: 2018-09-20

## 2022-04-26 PROBLEM — N18.6 TYPE 2 DIABETES MELLITUS WITH CHRONIC KIDNEY DISEASE ON CHRONIC DIALYSIS, WITH LONG-TERM CURRENT USE OF INSULIN (HCC): Status: ACTIVE | Noted: 2018-09-20

## 2022-04-26 LAB
ABSOLUTE EOS #: 0 K/UL (ref 0–0.4)
ABSOLUTE LYMPH #: 0.4 K/UL (ref 1–4.8)
ABSOLUTE MONO #: 0.4 K/UL (ref 0.1–1.3)
ALBUMIN SERPL-MCNC: 3.5 G/DL (ref 3.5–5.2)
ALP BLD-CCNC: 112 U/L (ref 35–104)
ALT SERPL-CCNC: 11 U/L (ref 5–33)
ANION GAP SERPL CALCULATED.3IONS-SCNC: 12 MMOL/L (ref 9–17)
AST SERPL-CCNC: 19 U/L
BASOPHILS # BLD: 1 % (ref 0–2)
BASOPHILS ABSOLUTE: 0 K/UL (ref 0–0.2)
BILIRUB SERPL-MCNC: 0.82 MG/DL (ref 0.3–1.2)
BILIRUBIN DIRECT: 0.23 MG/DL
BILIRUBIN, INDIRECT: 0.59 MG/DL (ref 0–1)
BUN BLDV-MCNC: 31 MG/DL (ref 8–23)
CALCIUM SERPL-MCNC: 9.3 MG/DL (ref 8.6–10.4)
CHLORIDE BLD-SCNC: 96 MMOL/L (ref 98–107)
CO2: 27 MMOL/L (ref 20–31)
CREAT SERPL-MCNC: 3.4 MG/DL (ref 0.5–0.9)
EOSINOPHILS RELATIVE PERCENT: 0 % (ref 0–4)
GFR AFRICAN AMERICAN: 16 ML/MIN
GFR NON-AFRICAN AMERICAN: 14 ML/MIN
GFR SERPL CREATININE-BSD FRML MDRD: ABNORMAL ML/MIN/{1.73_M2}
GLUCOSE BLD-MCNC: 114 MG/DL (ref 65–105)
GLUCOSE BLD-MCNC: 126 MG/DL (ref 70–99)
GLUCOSE BLD-MCNC: 157 MG/DL (ref 65–105)
GLUCOSE BLD-MCNC: 186 MG/DL (ref 65–105)
GLUCOSE BLD-MCNC: 246 MG/DL (ref 65–105)
HCT VFR BLD CALC: 31 % (ref 36–46)
HEMOGLOBIN: 10.2 G/DL (ref 12–16)
LACTIC ACID, SEPSIS: 1 MMOL/L (ref 0.5–1.9)
LYMPHOCYTES # BLD: 6 % (ref 24–44)
MCH RBC QN AUTO: 30.7 PG (ref 26–34)
MCHC RBC AUTO-ENTMCNC: 32.8 G/DL (ref 31–37)
MCV RBC AUTO: 93.7 FL (ref 80–100)
MONOCYTES # BLD: 6 % (ref 1–7)
PDW BLD-RTO: 16 % (ref 11.5–14.9)
PLATELET # BLD: 176 K/UL (ref 150–450)
PMV BLD AUTO: 8.3 FL (ref 6–12)
POTASSIUM SERPL-SCNC: 4.3 MMOL/L (ref 3.7–5.3)
PROCALCITONIN: 1.16 NG/ML
RBC # BLD: 3.31 M/UL (ref 4–5.2)
SEG NEUTROPHILS: 87 % (ref 36–66)
SEGMENTED NEUTROPHILS ABSOLUTE COUNT: 5.5 K/UL (ref 1.3–9.1)
SODIUM BLD-SCNC: 135 MMOL/L (ref 135–144)
TOTAL PROTEIN: 6.8 G/DL (ref 6.4–8.3)
WBC # BLD: 6.3 K/UL (ref 3.5–11)

## 2022-04-26 PROCEDURE — 6370000000 HC RX 637 (ALT 250 FOR IP): Performed by: FAMILY MEDICINE

## 2022-04-26 PROCEDURE — 6360000002 HC RX W HCPCS: Performed by: FAMILY MEDICINE

## 2022-04-26 PROCEDURE — 6370000000 HC RX 637 (ALT 250 FOR IP): Performed by: PSYCHIATRY & NEUROLOGY

## 2022-04-26 PROCEDURE — 80048 BASIC METABOLIC PNL TOTAL CA: CPT

## 2022-04-26 PROCEDURE — 84145 PROCALCITONIN (PCT): CPT

## 2022-04-26 PROCEDURE — 2500000003 HC RX 250 WO HCPCS: Performed by: INTERNAL MEDICINE

## 2022-04-26 PROCEDURE — 6370000000 HC RX 637 (ALT 250 FOR IP): Performed by: STUDENT IN AN ORGANIZED HEALTH CARE EDUCATION/TRAINING PROGRAM

## 2022-04-26 PROCEDURE — 90935 HEMODIALYSIS ONE EVALUATION: CPT

## 2022-04-26 PROCEDURE — 83605 ASSAY OF LACTIC ACID: CPT

## 2022-04-26 PROCEDURE — 2580000003 HC RX 258: Performed by: FAMILY MEDICINE

## 2022-04-26 PROCEDURE — 71045 X-RAY EXAM CHEST 1 VIEW: CPT

## 2022-04-26 PROCEDURE — 70551 MRI BRAIN STEM W/O DYE: CPT

## 2022-04-26 PROCEDURE — 80076 HEPATIC FUNCTION PANEL: CPT

## 2022-04-26 PROCEDURE — 99233 SBSQ HOSP IP/OBS HIGH 50: CPT | Performed by: PSYCHIATRY & NEUROLOGY

## 2022-04-26 PROCEDURE — 99232 SBSQ HOSP IP/OBS MODERATE 35: CPT | Performed by: FAMILY MEDICINE

## 2022-04-26 PROCEDURE — 2060000000 HC ICU INTERMEDIATE R&B

## 2022-04-26 PROCEDURE — 85025 COMPLETE CBC W/AUTO DIFF WBC: CPT

## 2022-04-26 PROCEDURE — 2580000003 HC RX 258: Performed by: STUDENT IN AN ORGANIZED HEALTH CARE EDUCATION/TRAINING PROGRAM

## 2022-04-26 PROCEDURE — 95816 EEG AWAKE AND DROWSY: CPT | Performed by: PSYCHIATRY & NEUROLOGY

## 2022-04-26 PROCEDURE — 82947 ASSAY GLUCOSE BLOOD QUANT: CPT

## 2022-04-26 PROCEDURE — 36415 COLL VENOUS BLD VENIPUNCTURE: CPT

## 2022-04-26 RX ORDER — TRAZODONE HYDROCHLORIDE 50 MG/1
75 TABLET ORAL NIGHTLY
Status: DISCONTINUED | OUTPATIENT
Start: 2022-04-26 | End: 2022-04-28 | Stop reason: HOSPADM

## 2022-04-26 RX ADMIN — ANTI-FUNGAL POWDER MICONAZOLE NITRATE TALC FREE: 1.42 POWDER TOPICAL at 21:13

## 2022-04-26 RX ADMIN — Medication 1.6 ML: at 14:14

## 2022-04-26 RX ADMIN — INSULIN GLARGINE 50 UNITS: 100 INJECTION, SOLUTION SUBCUTANEOUS at 20:55

## 2022-04-26 RX ADMIN — Medication 9 MG: at 20:40

## 2022-04-26 RX ADMIN — DOCUSATE SODIUM 100 MG: 100 CAPSULE, LIQUID FILLED ORAL at 20:40

## 2022-04-26 RX ADMIN — SODIUM BICARBONATE 650 MG: 650 TABLET ORAL at 20:41

## 2022-04-26 RX ADMIN — ACETAMINOPHEN 650 MG: 325 TABLET ORAL at 20:41

## 2022-04-26 RX ADMIN — TRAZODONE HYDROCHLORIDE 75 MG: 50 TABLET ORAL at 20:40

## 2022-04-26 RX ADMIN — SODIUM CHLORIDE, PRESERVATIVE FREE 10 ML: 5 INJECTION INTRAVENOUS at 21:13

## 2022-04-26 RX ADMIN — ATORVASTATIN CALCIUM 80 MG: 80 TABLET, FILM COATED ORAL at 16:54

## 2022-04-26 RX ADMIN — ASPIRIN 81 MG: 81 TABLET, COATED ORAL at 16:51

## 2022-04-26 RX ADMIN — CEFTRIAXONE SODIUM 1000 MG: 1 INJECTION, POWDER, FOR SOLUTION INTRAMUSCULAR; INTRAVENOUS at 23:23

## 2022-04-26 RX ADMIN — INSULIN LISPRO 3 UNITS: 100 INJECTION, SOLUTION INTRAVENOUS; SUBCUTANEOUS at 20:55

## 2022-04-26 RX ADMIN — RANOLAZINE 1000 MG: 1000 TABLET, FILM COATED, EXTENDED RELEASE ORAL at 21:46

## 2022-04-26 RX ADMIN — CARVEDILOL 3.12 MG: 3.12 TABLET, FILM COATED ORAL at 20:41

## 2022-04-26 RX ADMIN — PANTOPRAZOLE SODIUM 40 MG: 40 TABLET, DELAYED RELEASE ORAL at 06:35

## 2022-04-26 RX ADMIN — FEBUXOSTAT 40 MG: 40 TABLET, FILM COATED ORAL at 16:55

## 2022-04-26 RX ADMIN — ANTI-FUNGAL POWDER MICONAZOLE NITRATE TALC FREE: 1.42 POWDER TOPICAL at 10:57

## 2022-04-26 RX ADMIN — INSULIN LISPRO 3 UNITS: 100 INJECTION, SOLUTION INTRAVENOUS; SUBCUTANEOUS at 16:51

## 2022-04-26 RX ADMIN — APIXABAN 5 MG: 5 TABLET, FILM COATED ORAL at 20:40

## 2022-04-26 RX ADMIN — BUSPIRONE HYDROCHLORIDE 7.5 MG: 5 TABLET ORAL at 20:40

## 2022-04-26 NOTE — PROGRESS NOTES
Dialysis Safety Checks:    Patient ID Verified (Y/N) Y     -Hepatitis Test                   Date      Result  Hepatitis B Surface Antigen   3/30/22 Negative     Hepatitis B Surface Antibody 3/30/22 Positive= 60     Hepatitis B Core Antibody  3/30/22 Negative     -Treatment Initiation  Blood Vol Processed Goal (Liters)  Pump Speed x Treatment Hours x 60 Minutes    Target Fluid Removal 0832-6847 ml     * Intra-treatment documented Safety Checks include the followin) Access and face visible at all times. 2) All connections and blood lines are secure with no kinks. 3) NVL alarm engaged. 4) Hemosafe device applied (if applicable). 5) No collapse of Arterial or Venous blood chambers. 6) All blood lines / pump segments in the air detectors.   --------------------------------------------------------------------------------  [facility]

## 2022-04-26 NOTE — PROGRESS NOTES
FAMILY MEDICINE  - PROGRESS NOTE    Date:  4/26/2022  Abiola Dangelo  [id number]      Chief Complaint   Patient presents with    Shortness of Breath         Interval History:  Not changed, she is still confused. No significant events overnight. No significant events overnight. Specialists notes, labs & imaging reviewed.       Subjective  Constitutional: positive for obesity  Respiratory: positive for shortness of breath  Cardiovascular: positive for lower extremity edema  Musculoskeletal:positive for arthralgias and myalgias  Behavioral/Psych: positive for anxiety and depression  Endocrine: positive for diabetic symptoms including hyperglycemia:    Objective:    BP (!) 144/54   Pulse 79   Temp 99 °F (37.2 °C) (Oral) Comment: recheck per RN  Resp 19   Ht 5' 5\" (1.651 m)   Wt 257 lb 8 oz (116.8 kg)   SpO2 95%   BMI 42.85 kg/m²   General appearance - alert, well appearing, and in no distress and overweight  Mental status - alert, not in any distress  Eyes - pupils equal and reactive, extraocular eye movements intact  Ears - hearing grossly normal bilaterally  Nose - normal and patent, no erythema, discharge or polyps  Mouth - mucous membranes moist, pharynx normal without lesions  Neck - supple, no significant adenopathy  Lymphatics - no palpable lymphadenopathy, no hepatosplenomegaly  Chest - clear to auscultation, no wheezes, rales or rhonchi, symmetric air entry, decreased air entry noted posteriorly  Heart - normal rate, regular rhythm, normal S1, S2, no murmurs, rubs, clicks or gallops  Abdomen - soft, nontender, nondistended, no masses or organomegaly  Breasts - not examined  Back exam - not examined  Neurological - alert, oriented, normal speech, no focal findings or movement disorder noted  Musculoskeletal - no joint tenderness, deformity or swelling  Extremities - pedal edema 1 +  Skin - normal coloration and turgor, no rashes, no suspicious skin lesions noted    Data:   Medications:   Current Facility-Administered Medications   Medication Dose Route Frequency Provider Last Rate Last Admin    apixaban (ELIQUIS) tablet 5 mg  5 mg Oral BID Clarissa Reece MD   5 mg at 04/25/22 2137    insulin glargine (LANTUS) injection vial 50 Units  50 Units SubCUTAneous BID Yolanda Driver MD   50 Units at 04/25/22 2140    insulin lispro (HUMALOG) injection vial 0-18 Units  0-18 Units SubCUTAneous TID WC Yolanda Driver MD   3 Units at 04/24/22 1642    insulin lispro (HUMALOG) injection vial 0-9 Units  0-9 Units SubCUTAneous Nightly Yolanda Driver MD   2 Units at 04/25/22 2140    calcium carbonate (TUMS) chewable tablet 500 mg  500 mg Oral TID PRN Yolanda Driver MD   500 mg at 04/23/22 1037    furosemide (LASIX) tablet 80 mg  80 mg Oral Daily Geetarette Neva Cortes MD   80 mg at 04/25/22 9229    polyethylene glycol (GLYCOLAX) packet 17 g  17 g Oral Daily PRN Clarissa Reece MD   17 g at 04/23/22 1038    aspirin EC tablet 81 mg  81 mg Oral Daily Amanda Barnard MD   81 mg at 04/25/22 8520    sodium chloride flush 0.9 % injection 5-40 mL  5-40 mL IntraVENous 2 times per day Edwinna Saliva, DO   10 mL at 04/25/22 2138    sodium chloride flush 0.9 % injection 5-40 mL  5-40 mL IntraVENous PRN Edwinna Saliva, DO        0.9 % sodium chloride infusion   IntraVENous PRN Edwinna Saliva, DO 25 mL/hr at 04/20/22 1212 25 mL at 04/20/22 1212    ondansetron (ZOFRAN-ODT) disintegrating tablet 4 mg  4 mg Oral Q8H PRN Edwinna Saliva, DO        Or    ondansetron [facility]) injection 4 mg  4 mg IntraVENous Q6H PRN Edwinna Saliva, DO        ranolazine [facility]) extended release tablet 1,000 mg  1,000 mg Oral BID Yolanda Driver MD   1,000 mg at 04/25/22 2140    busPIRone (BUSPAR) tablet 7.5 mg  7.5 mg Oral TID Yolanda Driver MD   7.5 mg at 04/25/22 9682    pantoprazole (PROTONIX) tablet 40 mg  40 mg Oral QAM AC Yolanda Driver MD   40 mg at 04/26/22 9320    atorvastatin (LIPITOR) tablet 80 mg  80 mg Oral Daily Iona Gomes MD   80 mg at 04/25/22 6835    febuxostat (ULORIC) tablet 40 mg  40 mg Oral Daily Iona Gomes MD   40 mg at 04/25/22 0836    docusate sodium (COLACE) capsule 100 mg  100 mg Oral BID Iona Gomes MD   100 mg at 04/25/22 2137    [Held by provider] tamsulosin (FLOMAX) capsule 0.4 mg  0.4 mg Oral Daily Iona Gomes MD   0.4 mg at 04/18/22 0815    [Held by provider] insulin lispro (HUMALOG) injection vial 15 Units  15 Units SubCUTAneous TID AC Iona Gomes MD   15 Units at 04/16/22 1713    sodium bicarbonate tablet 650 mg  650 mg Oral TID Iona Gomes MD   650 mg at 04/25/22 2137    traZODone (DESYREL) tablet 50 mg  50 mg Oral Nightly Iona Gomes MD   50 mg at 04/25/22 2138    gabapentin (NEURONTIN) capsule 300 mg  300 mg Oral BID Iona Gomes MD   300 mg at 04/25/22 2137    melatonin tablet 9 mg  9 mg Oral Nightly Iona Gomes MD   9 mg at 04/25/22 2137    lidocaine-prilocaine (EMLA) cream   Topical PRN Iona Gomes MD        carvedilol (COREG) tablet 3.125 mg  3.125 mg Oral BID Iona Gomes MD   3.125 mg at 04/25/22 2137    sodium phosphate 10 mmol in dextrose 5 % 250 mL IVPB  10 mmol IntraVENous Once Aleksandar Chris MD        anticoagulant sodium citrate 4 % injection 1.6 mL  1.6 mL IntraCATHeter PRN Lindsey Arnold MD   1.6 mL at 04/23/22 1900    anticoagulant sodium citrate 4 % injection 1.6 mL  1.6 mL IntraCATHeter PRN Aleksandar Chris MD   1.6 mL at 04/23/22 1900    glucose (GLUTOSE) 40 % oral gel 15 g  15 g Oral PRN Iona Gomes MD        dextrose 50 % IV solution  12.5 g IntraVENous PRN Iona Gomes MD        glucagon (rDNA) injection 1 mg  1 mg IntraMUSCular PRN Iona Gomes MD        miconazole (MICOTIN) 2 % powder   Topical BID Iona Gomes MD   Given at 04/25/22 7367    acetaminophen (TYLENOL) tablet 650 mg  650 mg Oral Q4H PRN Hector Gallo Fany Jay, DO   650 mg at 04/20/22 1751       Intake/Output Summary (Last 24 hours) at 4/26/2022 0726  Last data filed at 4/26/2022 0120  Gross per 24 hour   Intake --   Output 220 ml   Net -220 ml     Recent Results (from the past 24 hour(s))   Urinalysis with Reflex to Culture    Collection Time: 04/25/22 11:13 AM    Specimen: Urine   Result Value Ref Range    Color, UA Dark Yellow (A) Yellow    Turbidity UA Clear Clear    Glucose, Ur NEGATIVE NEGATIVE    Bilirubin Urine SMALL (A) NEGATIVE    Ketones, Urine TRACE (A) NEGATIVE    Specific Gravity, UA 1.020 1.000 - 1.030    Urine Hgb NEGATIVE NEGATIVE    pH, UA 5.0 5.0 - 8.0    Protein, UA 2+ (A) NEGATIVE    Urobilinogen, Urine Normal Normal    Nitrite, Urine NEGATIVE NEGATIVE    Leukocyte Esterase, Urine SMALL (A) NEGATIVE   Microscopic Urinalysis    Collection Time: 04/25/22 11:13 AM   Result Value Ref Range    WBC, UA 3 to 5 /HPF    RBC, UA 3 to 5 /HPF    Casts UA 0 TO 2 /LPF    Epithelial Cells UA 0 TO 2 /HPF    Bacteria, UA None None   POC Glucose Fingerstick    Collection Time: 04/25/22 11:25 AM   Result Value Ref Range    POC Glucose 113 (H) 65 - 105 mg/dL   POC Glucose Fingerstick    Collection Time: 04/25/22  3:41 PM   Result Value Ref Range    POC Glucose 129 (H) 65 - 105 mg/dL   POC Glucose Fingerstick    Collection Time: 04/25/22  8:32 PM   Result Value Ref Range    POC Glucose 196 (H) 65 - 105 mg/dL   CBC with Auto Differential    Collection Time: 04/26/22  5:48 AM   Result Value Ref Range    WBC 6.3 3.5 - 11.0 k/uL    RBC 3.31 (L) 4.0 - 5.2 m/uL    Hemoglobin 10.2 (L) 12.0 - 16.0 g/dL    Hematocrit 31.0 (L) 36 - 46 %    MCV 93.7 80 - 100 fL    MCH 30.7 26 - 34 pg    MCHC 32.8 31 - 37 g/dL    RDW 16.0 (H) 11.5 - 14.9 %    Platelets 777 446 - 013 k/uL    MPV 8.3 6.0 - 12.0 fL    Seg Neutrophils 87 (H) 36 - 66 %    Lymphocytes 6 (L) 24 - 44 %    Monocytes 6 1 - 7 %    Eosinophils % 0 0 - 4 %    Basophils 1 0 - 2 %    Segs Absolute 5.50 1.3 - 9.1 k/uL Absolute Lymph # 0.40 (L) 1.0 - 4.8 k/uL    Absolute Mono # 0.40 0.1 - 1.3 k/uL    Absolute Eos # 0.00 0.0 - 0.4 k/uL    Basophils Absolute 0.00 0.0 - 0.2 k/uL   Basic Metabolic Panel    Collection Time: 04/26/22  5:48 AM   Result Value Ref Range    Glucose 126 (H) 70 - 99 mg/dL    BUN 31 (H) 8 - 23 mg/dL    CREATININE 3.40 (H) 0.50 - 0.90 mg/dL    Calcium 9.3 8.6 - 10.4 mg/dL    Sodium 135 135 - 144 mmol/L    Potassium 4.3 3.7 - 5.3 mmol/L    Chloride 96 (L) 98 - 107 mmol/L    CO2 27 20 - 31 mmol/L    Anion Gap 12 9 - 17 mmol/L    GFR Non-African American 14 (L) >60 mL/min    GFR  16 (L) >60 mL/min    GFR Comment         POC Glucose Fingerstick    Collection Time: 04/26/22  6:33 AM   Result Value Ref Range    POC Glucose 114 (H) 65 - 105 mg/dL     -----------------------------------------------------------------  RAD:  EKG:  Micro:     Assessment & Plan:    Patient Active Problem List:     Atherosclerosis of coronary artery bypass graft of native heart without angina pectoris     Chronic diastolic heart failure (HCC)     Diabetic polyneuropathy associated with type 2 diabetes mellitus (HCC)     History of coronary artery bypass graft     Iron deficiency anemia     Spinal stenosis of lumbar region with neurogenic claudication     Mixed hyperlipidemia     Type 2 diabetes mellitus with kidney complication, with long-term current use of insulin (HCC)     Thyroid nodule greater than or equal to 1 cm in diameter incidentally noted on imaging study     Essential hypertension     Chronic ischemic heart disease     Ischemic stroke of frontal lobe (HCC)     Morbid obesity with BMI of 40.0-44.9, adult (HCC)     Disequilibrium syndrome     Generalized weakness     Long-term memory loss     Muscle right arm weakness     Anxiety     Chronic midline low back pain with bilateral sciatica     Tremor     COVID     End-stage renal disease (HCC)     DKA, type 2, not at goal [facility])     Hypokalemia           Plan:  -DKA - resolved, hypoglycemic episodes continue, regular Lantus dose decreased to 50 units BID, SS coverage and carb control diet continued.  -Chronic hypoxic respiratory failure - on continuous oxygen per NC, O2 needs have increased, Pulmonology managing. -ESRD on dialysis - Nephrology managing.  -Acute on chronic CHF exacerbation - volume status being managed by Nephrology. -UTI - completed Rocephin, new U/A with cultures & sensitivities ordered. -TIA like symptoms - MRI & MRA negative, EEG ordered, Neurology following. -RLL infiltrate -repeat CXR today. -Generalized weakness - PT/OT treating. -D/C planning ongoing - PM&R evaluating for possible transfer to ARU . -Complete orders per chart.     See orders   Disposition:    Electronically signed by Callum Davis MD on 4/26/2022 at 7:26 AM

## 2022-04-26 NOTE — PLAN OF CARE
Problem: Falls - Risk of:  Goal: Will remain free from falls  Description: Will remain free from falls  4/25/2022 1710 by Deepak Garcia RN  Outcome: Progressing     Problem: Skin Integrity:  Goal: Will show no infection signs and symptoms  Description: Will show no infection signs and symptoms  4/25/2022 1710 by Deepak Garcia RN  Outcome: Progressing     Problem: Skin Integrity:  Goal: Absence of new skin breakdown  Description: Absence of new skin breakdown  4/25/2022 1710 by Deepak Garcia RN  Outcome: Progressing     Problem: Musculor/Skeletal Functional Status  Goal: Highest potential functional level  4/25/2022 1710 by Deepak Garcia RN  Outcome: Progressing     Problem: Musculor/Skeletal Functional Status  Goal: Absence of falls  4/25/2022 1710 by Deepak Garcia RN  Outcome: Progressing     Problem: Pain:  Goal: Pain level will decrease  Description: Pain level will decrease  4/25/2022 1710 by Deepak Garcia RN  Outcome: Progressing     Problem: Pain:  Goal: Control of acute pain  Description: Control of acute pain  4/25/2022 1710 by Deepak Garcia RN  Outcome: Progressing     Problem: Discharge Planning  Goal: Discharge to home or other facility with appropriate resources  4/25/2022 1710 by Deepak Garcia RN  Outcome: Progressing     Problem: Chronic Conditions and Co-morbidities  Goal: Patient's chronic conditions and co-morbidity symptoms are monitored and maintained or improved  4/25/2022 1710 by Deepak Garcia RN  Outcome: Progressing     Problem: ABCDS Injury Assessment  Goal: Absence of physical injury  4/25/2022 1710 by Deepak Garcia RN  Outcome: Progressing

## 2022-04-26 NOTE — PROGRESS NOTES
[facility] Neurology   IN-PATIENT SERVICE      NEUROLOGY PROGRESS  NOTE            Date:   4/26/2022  Patient name:  Qasim Mathews  Date of admission:  4/13/2022  YOB: 1960      Interval History:     Currently undergoing hemodialysis. She will arouse and answer some questions appropriately. She is unaware of which hospital she is in. Myoclonic jerks are still present. Sister Avinash Yoon is at bedside. History of Present Illness: The patient is a 57 y.o. female who presents with Shortness of Breath  . The patient was seen and examined and the chart was reviewed. Patient was initially being treated for AURELIA, CHF, lymphedema. Neurology was consulted due to episode of decreased responsiveness with blank staring. She then underwent MRI brain and MRA head and neck after this episode which not reveal any significant or acute abnormalities. 4/25:   Patient reportedly has been more confused, saying words out of context and also increase of asterixis over the last 24 hours or so. Neurology has been reconsulted. ABG showed lower PO2 level but CO2 level is not significantly elevated. Ammonia level is within normal limits. Urinalysis has been sent out and is pending.     Currently patient is awake and alert. She is able to answer some questions appropriately and other times will answer incorrectly. There does appear to be speech perseveration present. There is prominent negative myoclonus present in the upper and lower extremities which reportedly is worse than her baseline. There does appear to be a component of right hemineglect as well.     Past Medical History:     Past Medical History:   Diagnosis Date    Backache, unspecified     CHF (congestive heart failure) (HCC)     Chronic kidney disease     Coronary atherosclerosis of artery bypass graft     COVID 1/31/2022    Cramp of limb     Gallstones     Hypertension     Insomnia     Pneumonia     Type II or unspecified type diabetes mellitus with renal manifestations, not stated as uncontrolled(250.40)     Type II or unspecified type diabetes mellitus without mention of complication, not stated as uncontrolled     Unspecified vitamin D deficiency         Past Surgical History:     Past Surgical History:   Procedure Laterality Date    ANKLE FRACTURE SURGERY      AV FISTULA CREATION  12/14/2021    BREAST SURGERY      CARPAL TUNNEL RELEASE      CHOLECYSTECTOMY, OPEN N/A     CORONARY ARTERY BYPASS GRAFT      x3    DIALYSIS FISTULA CREATION Left 12/14/2021    LEFT AV FISTULA CREATION UPPER EXTREMITY performed by El Etienne MD at [address] [facility] IR TUNNELED CATHETER PLACEMENT GREATER THAN 5 YEARS  8/18/2021    IR TUNNELED CATHETER PLACEMENT GREATER THAN 5 YEARS 8/18/2021 [facility] SPECIAL PROCEDURES    KNEE ARTHROSCOPY      right    OTHER SURGICAL HISTORY  04/06/2022    cvc exchange    TONSILLECTOMY          Medications during admission:      apixaban  5 mg Oral BID    insulin glargine  50 Units SubCUTAneous BID    insulin lispro  0-18 Units SubCUTAneous TID WC    insulin lispro  0-9 Units SubCUTAneous Nightly    furosemide  80 mg Oral Daily    aspirin  81 mg Oral Daily    sodium chloride flush  5-40 mL IntraVENous 2 times per day    ranolazine  1,000 mg Oral BID    busPIRone  7.5 mg Oral TID    pantoprazole  40 mg Oral QAM AC    atorvastatin  80 mg Oral Daily    febuxostat  40 mg Oral Daily    docusate sodium  100 mg Oral BID    [Held by provider] tamsulosin  0.4 mg Oral Daily    [Held by provider] insulin lispro  15 Units SubCUTAneous TID AC    sodium bicarbonate  650 mg Oral TID    traZODone  50 mg Oral Nightly    gabapentin  300 mg Oral BID    melatonin  9 mg Oral Nightly    carvedilol  3.125 mg Oral BID    sodium phosphate IVPB  10 mmol IntraVENous Once    miconazole   Topical BID         Physical Exam:   BP (!) 132/48   Pulse 74   Temp 98.8 °F (37.1 °C) (Oral)   Resp 16   Ht 5' 5\" (1.651 m)   Wt 251 lb 5.2 oz (114 kg)   SpO2 91%   BMI 41.82 kg/m²   Temp (24hrs), Av.9 °F (37.2 °C), Min:98.8 °F (37.1 °C), Max:99 °F (37.2 °C)          Neurological examination:    Mental status    Alert and oriented x 2 with repeated prompting; following all commands; speech is appropriate at times. There is also speech perseveration at times.      Cranial nerves    II - visual fields intact to confrontation; pupils reactive  III, IV, VI - extraocular muscles intact; no CECELIA; no nystagmus; no ptosis   V - normal facial sensation                                                               VII - normal facial symmetry                                                             VIII - intact hearing                                                                             IX, X - symmetrical palate elevation                                               XI - symmetrical shoulder shrug                                                       XII - midline tongue without atrophy or fasciculation      Motor function   prominent negative myoclonus present in upper and lower extremities upon any type of movement. She is able to hold her arms antigravity bilaterally. Lower extremities 2/5 strength. Sensory function Intact to touch, pin, throughout      Cerebellar  unable to assess      Reflex function 2/4 symmetric throughout . Downgoing plantar response bilaterally.  (-)Meza's sign bilaterally    Gait                   unable to assess secondary to weakness                Diagnostics:      Laboratory Testing:  CBC:   Recent Labs     22  0517 22  0523 22  0548   WBC 4.5 4.4 6.3   HGB 9.6* 9.5* 10.2*   * 133* 176     BMP:    Recent Labs     22  0517 22  0910 22  1846 22  0617 22  0548   *  --   --  135 135   K 5.6*   < > 4.6 4.3 4.3   CL 96*  --   --  96* 96*   CO2 25  --   --  27 27   BUN 15  --   --  24* 31*   CREATININE 2.61*  --   --  3.51* 3.40*   GLUCOSE 252*  --   --  109* 126*    < > = values in this interval not displayed. Lab Results   Component Value Date    CHOL 105 04/09/2015    LDLCHOLESTEROL 72 12/26/2012    HDL 43 04/09/2015    TRIG 168 04/09/2015    ALT 11 04/26/2022    AST 19 04/26/2022    TSH 0.41 04/25/2022    INR 0.9 11/10/2021    LABA1C 11.3 (H) 04/05/2022    LABMICR 538 12/26/2012    [id number] 459 12/26/2012       Imaging/Diagnostics:      EEG: Moderate encephalopathy of nonspecific etiology. No epileptiform activity. Results for orders placed during the hospital encounter of 12/23/21    MRI CERVICAL SPINE WO CONTRAST    Narrative  EXAMINATION:  MRI OF THE CERVICAL SPINE WITHOUT CONTRAST 12/28/2021 4:43 pm    TECHNIQUE:  Multiplanar multisequence MRI of the cervical spine was performed without the  administration of intravenous contrast.    COMPARISON:  11/15/2021    HISTORY:  ORDERING SYSTEM PROVIDED HISTORY: leg weakness  TECHNOLOGIST PROVIDED HISTORY:  leg weakness  Reason for Exam: leg weakness  Additional signs and symptoms: patient complains of lower back pain    FINDINGS:  BONES/ALIGNMENT: There is straightening of the normal cervical lordosis. No  significant listhesis. Prior anterior fixation from C5-C7. Prior C6  corpectomy with interbody graft. There is associated hardware artifact. No  aggressive marrow signal abnormality. SPINAL CORD: No abnormal cord signal is seen. SOFT TISSUES: No paraspinal mass identified. 1.6 cm right thyroid nodule. C2-C3: There is no significant disc protrusion, spinal canal stenosis or  neural foraminal narrowing. C3-C4: There is no significant disc protrusion, spinal canal stenosis or  neural foraminal narrowing. C4-C5: No significant spinal canal stenosis. Uncovertebral facet DJD with  mild to moderate bilateral neural foraminal stenosis. C5-C6: Postoperative level without significant spinal canal stenosis.   Uncovertebral facet DJD with moderate to severe bilateral neural foraminal  stenosis. C6-C7: Postoperative level without significant spinal canal or neural  foraminal stenosis. C7-T1: There is no significant disc protrusion, spinal canal stenosis or  neural foraminal narrowing. Impression  1. Postoperative changes from C5-C7 without significant spinal canal stenosis. 2. Neural foraminal narrowing as above. 3. 1.6 cm right thyroid nodule. Ultrasound is recommended for further  characterization. RECOMMENDATIONS:  Managing Incidental Thyroid Nodule Detected at CT or MRI or US    1. Further evaluation by thyroid Ultrasound recommended for these incidental  nodules:    Patient Age 25 years or less - Nodule of any size    Patient Age 21-27 years old - Nodule 1 cm in size or greater    Patient Age 28 years or more - Nodule 1.5 cm in size or greater    2. Follow up thyroid ultrasound also recommend in these scenarios    - Solitary nodule with high risk imaging features (locally invasive nodule or  suspicious lymph nodes)    - Heterogeneous, enlarged thyroid gland.    - Increased uptake on PET    3.  NO further imaging is recommended in the following scenarios    - Any nodule not meeting above criteria. - Those patients with limited life expectancy or significant co-morbidities. Note: These recommendations do not apply to pts. w/ increased risk for  thyroid cancer or pts. with symptomatic thyroid disease. Reference: Recommendations for f/u of Incidental Thyroid Nodules (ITN) found  on CT, MR, NM and Extrathyroidal US are based upon the ACR white paper and  [facility] 3-tiered system for managing ITNs:J Am Toy Radiol.  2015 Feb;12(2):  143-50    Unavailable    Results for orders placed during the hospital encounter of 04/13/22    MRA HEAD WO CONTRAST    Narrative  EXAMINATION:  MRA OF THE HEAD WITHOUT CONTRAST; MRI OF THE BRAIN WITHOUT CONTRAST 4/18/2022  6:01 pm; 4/18/2022 5:15 pm:    TECHNIQUE:  MRA of the head was performed utilizing time-of-flight imaging with MIP  images. No intravenous contrast was administered.; Multiplanar multisequence  MRI of the brain was performed without the administration of intravenous  contrast.    COMPARISON:  12/28/2021    HISTORY:  ORDERING SYSTEM PROVIDED HISTORY: double vision  TECHNOLOGIST PROVIDED HISTORY:  double vision  Reason for Exam: Pt c/o double vision and right arm tingling, had episode of  staring and unresponsiveness earlier this afternoon. Pt  has had multiple  Brain MRI's since Sept 2021. FINDINGS:    MRI BRAIN:    INTRACRANIAL STRUCTURES/VENTRICLES: No acute infarct or mass. Stable mild  chronic white matter microvascular ischemic changes. No mass effect or  midline shift. No evidence of an acute intracranial hemorrhage. The  ventricles and sulci are normal in size and configuration. The  sellar/suprasellar regions appear unremarkable. The normal signal voids  within the major intracranial vessels appear maintained. ORBITS: Bilateral intra-ocular lens implants. Otherwise, normal.    SINUSES: The visualized paranasal sinuses and mastoid air cells are well  aerated. BONES/SOFT TISSUES: The bone marrow signal intensity appears normal. The soft  tissues demonstrate no acute abnormality. MRA HEAD:    ANTERIOR CIRCULATION: The internal carotid arteries are normal in course and  caliber without focal stenosis. The anterior cerebral and middle cerebral  arteries demonstrate no focal stenosis. POSTERIOR CIRCULATION: The posterior cerebral arteries demonstrate no focal  stenosis. The vertebral and basilar arteries appear unremarkable. ANEURYSM: No intracranial aneurysm is seen. Impression  1. No acute intracranial abnormality. 2. Mild chronic white matter microvascular ischemic changes. 3. Normal MRA of the head. No results found for this or any previous visit. No results found for this or any previous visit.     Results for orders placed during the hospital encounter of 04/13/22    MRI BRAIN WO CONTRAST    Narrative  EXAMINATION:  MRA OF THE HEAD WITHOUT CONTRAST; MRI OF THE BRAIN WITHOUT CONTRAST 4/18/2022  6:01 pm; 4/18/2022 5:15 pm:    TECHNIQUE:  MRA of the head was performed utilizing time-of-flight imaging with MIP  images. No intravenous contrast was administered.; Multiplanar multisequence  MRI of the brain was performed without the administration of intravenous  contrast.    COMPARISON:  12/28/2021    HISTORY:  ORDERING SYSTEM PROVIDED HISTORY: double vision  TECHNOLOGIST PROVIDED HISTORY:  double vision  Reason for Exam: Pt c/o double vision and right arm tingling, had episode of  staring and unresponsiveness earlier this afternoon. Pt  has had multiple  Brain MRI's since Sept 2021. FINDINGS:    MRI BRAIN:    INTRACRANIAL STRUCTURES/VENTRICLES: No acute infarct or mass. Stable mild  chronic white matter microvascular ischemic changes. No mass effect or  midline shift. No evidence of an acute intracranial hemorrhage. The  ventricles and sulci are normal in size and configuration. The  sellar/suprasellar regions appear unremarkable. The normal signal voids  within the major intracranial vessels appear maintained. ORBITS: Bilateral intra-ocular lens implants. Otherwise, normal.    SINUSES: The visualized paranasal sinuses and mastoid air cells are well  aerated. BONES/SOFT TISSUES: The bone marrow signal intensity appears normal. The soft  tissues demonstrate no acute abnormality. MRA HEAD:    ANTERIOR CIRCULATION: The internal carotid arteries are normal in course and  caliber without focal stenosis. The anterior cerebral and middle cerebral  arteries demonstrate no focal stenosis. POSTERIOR CIRCULATION: The posterior cerebral arteries demonstrate no focal  stenosis. The vertebral and basilar arteries appear unremarkable. ANEURYSM: No intracranial aneurysm is seen. Impression  1. No acute intracranial abnormality.   2. Mild chronic white matter microvascular ischemic changes. 3. Normal MRA of the head. No results found for this or any previous visit. No results found for this or any previous visit. Results for orders placed during the hospital encounter of 12/23/21    CT HEAD WO CONTRAST    Narrative  EXAMINATION:  CT OF THE HEAD WITHOUT CONTRAST  12/24/2021 2:40 am    TECHNIQUE:  CT of the head was performed without the administration of intravenous  contrast. Dose modulation, iterative reconstruction, and/or weight based  adjustment of the mA/kV was utilized to reduce the radiation dose to as low  as reasonably achievable. COMPARISON:  11/10/2021    HISTORY:  ORDERING SYSTEM PROVIDED HISTORY: AMS  TECHNOLOGIST PROVIDED HISTORY:    AMS  Reason for Exam: AMS    FINDINGS:  BRAIN/VENTRICLES: There is no acute intracranial hemorrhage, mass effect or  midline shift. No abnormal extra-axial fluid collection. The gray-white  differentiation is maintained without evidence of an acute infarct. There is  no evidence of hydrocephalus. Mild diffuse cerebral atrophy and chronic white  matter ischemic change. ORBITS: The visualized portion of the orbits demonstrate no acute  abnormality. Incidental note of focal posterior retinal calcifications on  the right not consistent with drusen. SINUSES: The visualized paranasal sinuses and mastoid air cells demonstrate  no acute abnormality. SOFT TISSUES/SKULL:  No acute abnormality of the visualized skull or soft  tissues. Impression  No acute intracranial abnormality. RECOMMENDATIONS:  Unavailable      I personally reviewed all of the above medications, clinical laboratory, imaging and other diagnostic tests. Impression:      1. Worsening confusion, speech perseveration and questionable right hemineglect; will rule out new stroke. Possible metabolic component  2. Worsening myoclonic jerks; ?  Etiology. Kidney function appears stable.   Ammonia is normal.  No evidence of elevated CO2.    Plan:      Awaiting repeat MRI brain   Minimize distractions at night to help patient obtain full night sleep. Sleep-wake cycle regulation may overall help her current symptoms.  We will bump up trazodone to 75 mg nightly.  We will hold gabapentin to see if this improves her myoclonus.  Discussed with sister at bedside.  We will follow.         Electronically signed by Noni De Leon DO on 4/26/2022 at 2:06 PM      Noni De Leon, [address] [place] [address]  Neurology

## 2022-04-26 NOTE — PROGRESS NOTES
HEMODIALYSIS POST TREATMENT NOTE    Treatment time ordered: 195 minutes    Actual treatment time: 195 minutes    UltraFiltration Goal: 2000 ml  UltraFiltration Removed: 1500 ml      Pre Treatment weight: 114 Kg actual bed scale  Post Treatment weight: 112.5 KG  Estimated Dry Weight:     Access used:     Central Venous Catheter:          Tunneled           Site: Rt chest          Access Flow: good      Internal Access:       AV Fistula    Not used for today's treatment         Site: lt upper arm       Access Flow: good bruit and thrill       Sign and symptoms of infection: No       If YES:    Medications or blood products given: N/A    Chronic outpatient schedule: [facility]    Chronic outpatient unit: [address]  ( per sister, patient runs at [address]. No longer at [facility] [address])    Summary of response to treatment: Tolerated dialysis well    Explain if orders NOT met, was physician notified:N/A       ACES flowsheet faxed to patient unit/ placed in patient chart: Epic charting    Post assessment completed: Yes    Report given to: Neeta Willingham RN      * Intra-treatment documented Safety Checks include the followin) Access and face visible at all times. 2) All connections and blood lines are secure with no kinks. 3) NVL alarm engaged. 4) Hemosafe device applied (if applicable). 5) No collapse of Arterial or Venous blood chambers. 6) All blood lines / pump segments in the air detectors.

## 2022-04-26 NOTE — PROGRESS NOTES
Spoke with Lianet, [address], to determine if pt would like to proceed with appeal.  Lianet states pt has not yet decided plan, and Lianet will discuss with pt and notify writer. Spoke with Lianet who states pt is planning to d/c to SNF at this time.

## 2022-04-26 NOTE — PROGRESS NOTES
Physical Therapy  DATE: 2022    NAME: Eufemia Lepe  MRN: [id number]   : 1960    Patient not seen this date for Physical Therapy due to:      [] Cancel by RN or physician due to:    [] Hemodialysis    [] Critical Lab Value Level     [] Blood transfusion in progress    [] Acute or unstable cardiovascular status   _MAP < 55 or more than >115  _HR < 40 or > 130    [] Acute or unstable pulmonary status   -FiO2 > 60%   _RR < 5 or >40    _O2 sats < 85%    [] Strict Bedrest    [] Off Unit for surgery or procedure    [] Off Unit for testing       [] Pending imaging to R/O fracture    [] Refusal by Patient      [x] Other; writer in patients room from [id number]. Patient sitting up in bed upon arrival. Patient had her eyes half open and would not follow writers commands to open her eyes and look at Brooke Caprice. Patient was having tangential speech and was not able to respond appropriatly to writer. Writer saw patient on the day prior and patient has regressed from prior day. Writer took patients BP while she was in a semi fowlers position. BP 77/56 HR 90 SpO2 91%. Re checked BP. Second reading was 137/75. RN Stanley made aware of patients condition. [] PT being discontinued at this time. Patient independent. No further needs. [] PT being discontinued at this time as the patient has been transferred to hospice care. No further needs.       Electronically signed by Silvino Cardozo PTA on 22 at 1:30 PM EDT

## 2022-04-26 NOTE — PROGRESS NOTES
Nephrology Progress Note    Reason for consultation: Management of hemodialysis dependent end-stage renal disease. Consulting physician: Doroteo Wood MD    Interval history: Patient was seen and examined today and she does not have any complaints. She is alert and oriented and is scheduled for hemodialysis today. History of Present Illness: This is a 57 y.o. female with hypertension, type 2 diabetes mellitus, end-stage renal disease on hemodialysis   who was recently taken off dialysis about 6 weeks ago due to regaining residual kidney function. Over the course of the last 2 weeks patient  was restarted on hemodialysis due to worsening fluid overload. Initially had problems with her  tunneled catheter which was replaced last week- patient was dialyzed on Saturday, 4/9/2021 and yesterday-4/13/22  at the outpatient dialysis unit. Patient presented with complaints of shortness of breath over the last 2 to 3 days progressively worsening. Patient had associated  Nausea, chest pain but no vomiting. She is on 3 L nasal cannula chronically at home. Patient was found to be in acute DKA with blood sugars greater than 500 was started on the modified DKA protocol. Blood pressures on admission were elevated above 200s and her chest x-ray showed evidence of pulmonary vascular congestion. Labs showed a potassium of 3.3 with BUN/creatinine of 18 and 1.78 mg/dL, bicarbonate of 18 and troponin of 78. Beta hydroxybutyrate was 7.57.     Objective/     Vitals:    04/25/22 2006 04/26/22 0023 04/26/22 0823 04/26/22 1052   BP: (!) 165/61 (!) 144/54 (!) 97/38 131/63   Pulse: 83 79 96 87   Resp: 19 19 18 18   Temp: 98.9 °F (37.2 °C) 99 °F (37.2 °C) 98.8 °F (37.1 °C)    TempSrc: Oral Oral Oral    SpO2: 93% 95% 97%    Weight:    251 lb 5.2 oz (114 kg)   Height:         24HR INTAKE/OUTPUT:      Intake/Output Summary (Last 24 hours) at 4/26/2022 1229  Last data filed at 4/26/2022 1052  Gross per 24 hour   Intake 360 ml Output 220 ml   Net 140 ml     Patient Vitals for the past 96 hrs (Last 3 readings):   Weight   04/26/22 1052 251 lb 5.2 oz (114 kg)   04/24/22 0500 257 lb 8 oz (116.8 kg)   04/23/22 0300 258 lb 6.1 oz (117.2 kg)     Constitutional: Alert and oriented x3  Cardiovascular:  S1, S2 without pericardial rub or gallop. Respiratory: Diminished breath sounds at lung bases but no rhonchi. Abdomen: Full, soft with normal bowel sounds. Ext: 1+ LE edema    Data/  Recent Labs     04/24/22  0517 04/25/22  0523 04/26/22  0548   WBC 4.5 4.4 6.3   HGB 9.6* 9.5* 10.2*   HCT 29.2* 28.5* 31.0*   MCV 93.9 93.2 93.7   * 133* 176     Recent Labs     04/24/22  0517 04/24/22  0910 04/24/22  1846 04/25/22  0617 04/26/22  0548   *  --   --  135 135   K 5.6*   < > 4.6 4.3 4.3   CL 96*  --   --  96* 96*   CO2 25  --   --  27 27   GLUCOSE 252*  --   --  109* 126*   PHOS  --   --   --  4.2  --    BUN 15  --   --  24* 31*   CREATININE 2.61*  --   --  3.51* 3.40*   LABGLOM 18*  --   --  13* 14*   GFRAA 22*  --   --  16* 16*    < > = values in this interval not displayed. Assessment/Plan:     1. End-stage renal disease - on hemodialysis by way of a right-sided tunneled catheter, transiently taken off dialysis 6 weeks ago and re- started due to worsening fluid overload 1 week ago. She still has some residual renal function [500 mL urine per day]. We will place on a TTS hemodialysis schedule. We will continue as needed midodrine and albumin with dialysis. Patient will receive acute hemodialysis today. Renal diet,i.e 2-gram sodium,2-gram potassium,1500 ml fluid restriction,1-gram phosphorus, 1800 KCal and 1.2 gram protein per day.     2. Systemic hypertension - Blood pressure control is slightly suboptimal due to plasma volume expansion. We will monitor blood pressure response to ultrafiltration and continue current medications.     3. Normocytic anemia of chronic kidney disease - hemoglobin today 9.5 g/dL.     4.  Mineral and bone disease profile - Serum phosphorus 4.2 mg/dL [4/25/2022] is within target range    Prognosis is guarded.     Gulshan Lainez FACP  Attending Clinical Nephrologist

## 2022-04-26 NOTE — PROGRESS NOTES
Dr. Renetta Jarvis MD / Dr. Smitha Rabago MD / Dr. Danna SHELBY CNP   Jayson CNP                  Pulmonary Progress  Note   [facility]      Patient - Freeman Montes De Oca,  Age - 57 y.o.    - 1960      Room Number - 4955/4577-23   N -  [id number]   Acct # - [de-identified]  Date of Admission -  2022  3:17 PM        Consulting Liudmila Charlton MD  Primary Care Physician - AFSANEH Henry - CNP     HPI   Patient's vital signs remained stable. Temp 37.2. On 3 L nasal cannula. Pt is alert and oriented to self nut very confused. Picking things in the air that are not there and not making sense when talking. She can see the clock but unable to tell me the time. She denies any pain or shortness of breath    OBJECTIVE   VITALS    height is 5' 5\" (1.651 m) and weight is 257 lb 8 oz (116.8 kg). Her oral temperature is 98.8 °F (37.1 °C). Her blood pressure is 97/38 (abnormal) and her pulse is 96. Her respiration is 18 and oxygen saturation is 97%. Body mass index is 42.85 kg/m².   Temperature Range: Temp: 98.8 °F (37.1 °C) Temp  Av.9 °F (37.2 °C)  Min: 98.8 °F (37.1 °C)  Max: 99 °F (37.2 °C)  BP Range:  Systolic (67SAN), XFU:199 , Min:97 , QXY:861     Diastolic (10TQD), ZJK:30, Min:38, Max:61    Pulse Range: Pulse  Av  Min: 79  Max: 96  Respiration Range: Resp  Av.7  Min: 18  Max: 19  Current Pulse Ox[de-identified]  SpO2: 97 %  24HR Pulse Ox Range:  SpO2  Av %  Min: 93 %  Max: 97 %  Oxygen Amount and Delivery: O2 Flow Rate (L/min): 3 L/min    Wt Readings from Last 3 Encounters:   22 257 lb 8 oz (116.8 kg)   04/06/22 248 lb (112.5 kg)   22 250 lb (113.4 kg)       I/O (24 Hours)    Intake/Output Summary (Last 24 hours) at 2022 0926  Last data filed at 2022 0120  Gross per 24 hour   Intake --   Output 220 ml   Net -220 ml       EXAM     General Appearance hallucinating and confused  HEENT - normocephalic, atraumatic   Neck - Supple,  trachea midline   Lungs - nonlabored. Lung sounds diminished in bases clear anterior  Cardiovascular - Heart sounds are normal.  Regular rate and rhythm   Abdomen - obese  Neurologic - confused but follows simple commands.  No asymmetry that I can appreciate  Skin - No bruising or bleeding  Extremities - No  cyanosis, edema    MEDS      apixaban  5 mg Oral BID    insulin glargine  50 Units SubCUTAneous BID    insulin lispro  0-18 Units SubCUTAneous TID WC    insulin lispro  0-9 Units SubCUTAneous Nightly    furosemide  80 mg Oral Daily    aspirin  81 mg Oral Daily    sodium chloride flush  5-40 mL IntraVENous 2 times per day    ranolazine  1,000 mg Oral BID    busPIRone  7.5 mg Oral TID    pantoprazole  40 mg Oral QAM AC    atorvastatin  80 mg Oral Daily    febuxostat  40 mg Oral Daily    docusate sodium  100 mg Oral BID    [Held by provider] tamsulosin  0.4 mg Oral Daily    [Held by provider] insulin lispro  15 Units SubCUTAneous TID AC    sodium bicarbonate  650 mg Oral TID    traZODone  50 mg Oral Nightly    gabapentin  300 mg Oral BID    melatonin  9 mg Oral Nightly    carvedilol  3.125 mg Oral BID    sodium phosphate IVPB  10 mmol IntraVENous Once    miconazole   Topical BID      sodium chloride 25 mL (04/20/22 1212)     calcium carbonate, polyethylene glycol, sodium chloride flush, sodium chloride, ondansetron **OR** ondansetron, lidocaine-prilocaine, anticoagulant sodium citrate, anticoagulant sodium citrate, glucose, dextrose, glucagon (rDNA), acetaminophen    LABS   CBC   Recent Labs     04/26/22  0548   WBC 6.3   HGB 10.2*   HCT 31.0*   MCV 93.7        BMP:   Lab Results   Component Value Date     04/26/2022    K 4.3 04/26/2022    CL 96 04/26/2022    CO2 27 04/26/2022    BUN 31 04/26/2022    LABALBU 3.6 01/17/2022    CREATININE 3.40 04/26/2022    CALCIUM 9.3 04/26/2022    GFRAA 16 04/26/2022    LABGLOM 14 04/26/2022     ABGs:  Lab Results   Component Value Date    PHART 7.429 04/24/2022    PO2ART 47.6 04/24/2022    NQT9IOM 45.9 04/24/2022      Lab Results   Component Value Date    MODE CONDITION NO LONGER WARRANTS 04/06/2021     Ionized Calcium:  No results found for: IONCA  Magnesium:    Lab Results   Component Value Date    MG 1.9 04/18/2022     Phosphorus:    Lab Results   Component Value Date    PHOS 4.2 04/25/2022        LIVER PROFILE No results for input(s): AST, ALT, LIPASE, BILIDIR, BILITOT, ALKPHOS in the last 72 hours. Invalid input(s): AMYLASE,  ALB  INR No results for input(s): INR in the last 72 hours. PTT   Lab Results   Component Value Date    APTT 27.5 11/10/2021         RADIOLOGY     (See actual reports for details)    ASSESSMENT/PLAN       1. RLL pneumonia/atelectasis versus fluid volume excess  2. DKA - resolved  3. Altered mental status- worsening with hallucinations  4. HTN urgency  5. UTI-completed ceftriaxone, culture with no signficant growth  6. ESRD on hemodialysis, TTS  7. Acute on Chronic diastolic heart failure-Grade 1  8. Syncopal episode   9. Chronic hypoxic respiratory insufficiency-on 2 L chronically  10. ERICK (clinical diagnosis)  11. Obesity  12. Full code          Plan:  Wean O2 back to baseline of 2 L  Continue with fluid removal per nephrology service  Urine culture  Plans for lactic acid and blood cultures  Chest xray to rule out any pneumonia  Ammonia level and CO2 level all normal  Plans for MRI brain   EEG results pending  Could some of this confusion be sleep deprivation related?   Patient will need outpatient follow-up with [facility] due to insurance would benefit from outpatient sleep study      Electronically signed by Reta Gilford, APRN - CNP on 4/26/2022 at 9:26 AM

## 2022-04-26 NOTE — CARE COORDINATION
SW spoke with pt's sisters at bedside regarding SNF placement. Family still wants [facility], but are interested in their sister facilities, [facility] and [facility] as secondary choices. SW sent referrals to these facilities. [facility] is OON with pt insurance. Tamara Almendarez is reviewing clinicals and running pt's insurance benefits to see if they are in network.  SW following

## 2022-04-26 NOTE — PROCEDURES
EEG REPORT    Patient Name: Andi To  Patient MRN: [id number]    Referring Physician: Suzanne Jarvis DO  Dictating Physician: Suzanne Jarvis DO  Date: 4/25/2022      CLINICAL HISTORY: This EEG was performed on a 57 y.o. female with The primary encounter diagnosis was Diabetic ketoacidosis without coma associated with type 2 diabetes mellitus ([facility] [address] A diagnosis of Dyspnea, unspecified type was also pertinent to this visit. . It is being performed to evaluate for seizure activity. CURRENT ANTI-EPILEPTIC MEDICATIONS: Neurontin    DESCRIPTION: Wakefulness, and drowsiness were  obtained. During wakefulness there was a posterior background of poorly modulated, reactive, symmetrical, low to moderate voltage, 6 Hz activity. During drowsiness there were symmetrical vertex sharp waves. Photic stimulation evoked no significant posterior driving response. Hyperventilation was not performed due to patient state. EEG DIAGNOSIS: This EEG was abnormal due to the presence of:  1. Moderate diffuse background slowing. CLINICAL INTERPRETATION: This EEG is indicative of the presence of a moderate encephalopathy, of nonspecific etiology. No epileptiform activity was  present.       Suzanne Jarvis, [address] [facility]  Neurology

## 2022-04-27 LAB
ABSOLUTE EOS #: 0 K/UL (ref 0–0.4)
ABSOLUTE LYMPH #: 0.5 K/UL (ref 1–4.8)
ABSOLUTE MONO #: 0.4 K/UL (ref 0.1–1.3)
ANION GAP SERPL CALCULATED.3IONS-SCNC: 11 MMOL/L (ref 9–17)
BASOPHILS # BLD: 1 % (ref 0–2)
BASOPHILS ABSOLUTE: 0 K/UL (ref 0–0.2)
BUN BLDV-MCNC: 22 MG/DL (ref 8–23)
CALCIUM SERPL-MCNC: 8.8 MG/DL (ref 8.6–10.4)
CHLORIDE BLD-SCNC: 97 MMOL/L (ref 98–107)
CO2: 26 MMOL/L (ref 20–31)
CREAT SERPL-MCNC: 2.78 MG/DL (ref 0.5–0.9)
EOSINOPHILS RELATIVE PERCENT: 1 % (ref 0–4)
GFR AFRICAN AMERICAN: 21 ML/MIN
GFR NON-AFRICAN AMERICAN: 17 ML/MIN
GFR SERPL CREATININE-BSD FRML MDRD: ABNORMAL ML/MIN/{1.73_M2}
GLUCOSE BLD-MCNC: 120 MG/DL (ref 65–105)
GLUCOSE BLD-MCNC: 127 MG/DL (ref 70–99)
GLUCOSE BLD-MCNC: 159 MG/DL (ref 65–105)
GLUCOSE BLD-MCNC: 194 MG/DL (ref 65–105)
GLUCOSE BLD-MCNC: 211 MG/DL (ref 65–105)
HCT VFR BLD CALC: 25.5 % (ref 36–46)
HEMOGLOBIN: 8.6 G/DL (ref 12–16)
LYMPHOCYTES # BLD: 14 % (ref 24–44)
MCH RBC QN AUTO: 31.2 PG (ref 26–34)
MCHC RBC AUTO-ENTMCNC: 33.9 G/DL (ref 31–37)
MCV RBC AUTO: 91.8 FL (ref 80–100)
MONOCYTES # BLD: 10 % (ref 1–7)
PDW BLD-RTO: 15.9 % (ref 11.5–14.9)
PLATELET # BLD: 149 K/UL (ref 150–450)
PMV BLD AUTO: 8.1 FL (ref 6–12)
POTASSIUM SERPL-SCNC: 3.8 MMOL/L (ref 3.7–5.3)
RBC # BLD: 2.77 M/UL (ref 4–5.2)
SEG NEUTROPHILS: 74 % (ref 36–66)
SEGMENTED NEUTROPHILS ABSOLUTE COUNT: 2.6 K/UL (ref 1.3–9.1)
SODIUM BLD-SCNC: 134 MMOL/L (ref 135–144)
WBC # BLD: 3.5 K/UL (ref 3.5–11)

## 2022-04-27 PROCEDURE — 2580000003 HC RX 258: Performed by: NURSE PRACTITIONER

## 2022-04-27 PROCEDURE — 87086 URINE CULTURE/COLONY COUNT: CPT

## 2022-04-27 PROCEDURE — 6360000002 HC RX W HCPCS: Performed by: FAMILY MEDICINE

## 2022-04-27 PROCEDURE — 80048 BASIC METABOLIC PNL TOTAL CA: CPT

## 2022-04-27 PROCEDURE — 97530 THERAPEUTIC ACTIVITIES: CPT

## 2022-04-27 PROCEDURE — 6360000002 HC RX W HCPCS: Performed by: NURSE PRACTITIONER

## 2022-04-27 PROCEDURE — 97110 THERAPEUTIC EXERCISES: CPT

## 2022-04-27 PROCEDURE — 6370000000 HC RX 637 (ALT 250 FOR IP): Performed by: INTERNAL MEDICINE

## 2022-04-27 PROCEDURE — 36415 COLL VENOUS BLD VENIPUNCTURE: CPT

## 2022-04-27 PROCEDURE — 99232 SBSQ HOSP IP/OBS MODERATE 35: CPT | Performed by: FAMILY MEDICINE

## 2022-04-27 PROCEDURE — 6370000000 HC RX 637 (ALT 250 FOR IP): Performed by: FAMILY MEDICINE

## 2022-04-27 PROCEDURE — 6370000000 HC RX 637 (ALT 250 FOR IP): Performed by: PSYCHIATRY & NEUROLOGY

## 2022-04-27 PROCEDURE — 85025 COMPLETE CBC W/AUTO DIFF WBC: CPT

## 2022-04-27 PROCEDURE — 2580000003 HC RX 258: Performed by: FAMILY MEDICINE

## 2022-04-27 PROCEDURE — 99232 SBSQ HOSP IP/OBS MODERATE 35: CPT | Performed by: PSYCHIATRY & NEUROLOGY

## 2022-04-27 PROCEDURE — 2580000003 HC RX 258: Performed by: STUDENT IN AN ORGANIZED HEALTH CARE EDUCATION/TRAINING PROGRAM

## 2022-04-27 PROCEDURE — 82947 ASSAY GLUCOSE BLOOD QUANT: CPT

## 2022-04-27 PROCEDURE — 2060000000 HC ICU INTERMEDIATE R&B

## 2022-04-27 RX ADMIN — INSULIN LISPRO 3 UNITS: 100 INJECTION, SOLUTION INTRAVENOUS; SUBCUTANEOUS at 21:26

## 2022-04-27 RX ADMIN — DOCUSATE SODIUM 100 MG: 100 CAPSULE, LIQUID FILLED ORAL at 21:28

## 2022-04-27 RX ADMIN — INSULIN GLARGINE 50 UNITS: 100 INJECTION, SOLUTION SUBCUTANEOUS at 21:27

## 2022-04-27 RX ADMIN — RANOLAZINE 1000 MG: 1000 TABLET, FILM COATED, EXTENDED RELEASE ORAL at 13:57

## 2022-04-27 RX ADMIN — SODIUM CHLORIDE, PRESERVATIVE FREE 10 ML: 5 INJECTION INTRAVENOUS at 21:34

## 2022-04-27 RX ADMIN — BUSPIRONE HYDROCHLORIDE 7.5 MG: 5 TABLET ORAL at 13:54

## 2022-04-27 RX ADMIN — ATORVASTATIN CALCIUM 80 MG: 80 TABLET, FILM COATED ORAL at 08:36

## 2022-04-27 RX ADMIN — CARVEDILOL 3.12 MG: 3.12 TABLET, FILM COATED ORAL at 08:36

## 2022-04-27 RX ADMIN — SODIUM CHLORIDE, PRESERVATIVE FREE 10 ML: 5 INJECTION INTRAVENOUS at 08:37

## 2022-04-27 RX ADMIN — RANOLAZINE 1000 MG: 1000 TABLET, FILM COATED, EXTENDED RELEASE ORAL at 21:37

## 2022-04-27 RX ADMIN — INSULIN GLARGINE 50 UNITS: 100 INJECTION, SOLUTION SUBCUTANEOUS at 08:34

## 2022-04-27 RX ADMIN — APIXABAN 5 MG: 5 TABLET, FILM COATED ORAL at 21:28

## 2022-04-27 RX ADMIN — Medication 9 MG: at 21:28

## 2022-04-27 RX ADMIN — SODIUM BICARBONATE 650 MG: 650 TABLET ORAL at 21:29

## 2022-04-27 RX ADMIN — TRAZODONE HYDROCHLORIDE 75 MG: 50 TABLET ORAL at 21:28

## 2022-04-27 RX ADMIN — DOCUSATE SODIUM 100 MG: 100 CAPSULE, LIQUID FILLED ORAL at 08:36

## 2022-04-27 RX ADMIN — PANTOPRAZOLE SODIUM 40 MG: 40 TABLET, DELAYED RELEASE ORAL at 06:14

## 2022-04-27 RX ADMIN — ANTI-FUNGAL POWDER MICONAZOLE NITRATE TALC FREE: 1.42 POWDER TOPICAL at 21:36

## 2022-04-27 RX ADMIN — INSULIN LISPRO 3 UNITS: 100 INJECTION, SOLUTION INTRAVENOUS; SUBCUTANEOUS at 12:03

## 2022-04-27 RX ADMIN — INSULIN LISPRO 3 UNITS: 100 INJECTION, SOLUTION INTRAVENOUS; SUBCUTANEOUS at 17:36

## 2022-04-27 RX ADMIN — ASPIRIN 81 MG: 81 TABLET, COATED ORAL at 08:36

## 2022-04-27 RX ADMIN — SODIUM BICARBONATE 650 MG: 650 TABLET ORAL at 13:54

## 2022-04-27 RX ADMIN — BUSPIRONE HYDROCHLORIDE 7.5 MG: 5 TABLET ORAL at 08:38

## 2022-04-27 RX ADMIN — BUSPIRONE HYDROCHLORIDE 7.5 MG: 5 TABLET ORAL at 21:28

## 2022-04-27 RX ADMIN — APIXABAN 5 MG: 5 TABLET, FILM COATED ORAL at 08:36

## 2022-04-27 RX ADMIN — FEBUXOSTAT 40 MG: 40 TABLET, FILM COATED ORAL at 13:57

## 2022-04-27 RX ADMIN — SODIUM BICARBONATE 650 MG: 650 TABLET ORAL at 08:36

## 2022-04-27 RX ADMIN — FUROSEMIDE 80 MG: 40 TABLET ORAL at 08:36

## 2022-04-27 RX ADMIN — ANTI-FUNGAL POWDER MICONAZOLE NITRATE TALC FREE: 1.42 POWDER TOPICAL at 14:03

## 2022-04-27 RX ADMIN — CEFEPIME HYDROCHLORIDE 2000 MG: 2 INJECTION, POWDER, FOR SOLUTION INTRAVENOUS at 13:07

## 2022-04-27 RX ADMIN — AZITHROMYCIN MONOHYDRATE 500 MG: 500 INJECTION, POWDER, LYOPHILIZED, FOR SOLUTION INTRAVENOUS at 01:22

## 2022-04-27 ASSESSMENT — PAIN SCALES - GENERAL: PAINLEVEL_OUTOF10: 8

## 2022-04-27 NOTE — PROGRESS NOTES
Occupational Therapy  Facility/Department: [facility] PROGRESSIVE CARE  Daily Treatment Note  NAME: Peterson Marte  : 1960  MRN: [id number]    Date of Service: 2022    Discharge Recommendations:  Patient would benefit from continued therapy after discharge         Patient Diagnosis(es): The primary encounter diagnosis was Diabetic ketoacidosis without coma associated with type 2 diabetes mellitus ([facility] [address] A diagnosis of Dyspnea, unspecified type was also pertinent to this visit. Assessment    Activity Tolerance: Patient tolerated treatment well;Patient limited by endurance;Treatment limited secondary to decreased cognition  Discharge Recommendations: Patient would benefit from continued therapy after discharge      Plan   PT Plan of Care: Daily  Plan  Times per Week: 4-6  Current Treatment Recommendations: Self-Care / ADL; Strengthening;ROM;Balance training;Functional mobility training; Endurance training;Pain management; Safety education & training;Patient/Caregiver education & training;Equipment evaluation, education, & procurement;Home management training     Subjective   Subjective  Subjective: \"I just got back in bed from sitting up in the chair, but I will do whatever you want me to do. \"  Pain: No c/o  Cognition  Overall Cognitive Status: [facility]  Cognition Comment:  (Improved cognition after starting antibiotic last night')  Patient affect[de-identified] Normal        Objective    Vitals     Bed Mobility Training  Bed Mobility Training: Yes  Overall Level of Assistance: Contact-guard assistance  Rolling: Contact-guard assistance;Stand-by assistance  Supine to Sit: Supervision (With HOB elevated. Increased indep this date.)  Sit to Supine: Minimum assistance (For assisting L LE only.)  Scooting: Stand-by assistance (Scooting closer to EOB.  Scooting/shifting self towards HOB)  Balance  Sitting: Without support (Functional activity at EOB for ~20min.)  Transfer Training  Transfer Training: No (Declined any functional standing/transfers during session.)     ADL  Feeding: Setup  Grooming: Setup  UE Bathing: Supervision  LE Bathing: Minimal assistance  UE Dressing: Supervision  LE Dressing: Moderate assistance  Toileting: Moderate assistance  Toileting Skilled Clinical Factors: Has been inconsistent due to patient recent confusion and intensity of tremors. Additional Comments: ADL scores based on clinical reasoning and skilled observation unless otherwise noted. Pt currently limited due to decreased strength, balance, and activity tolerance impacting safety and independence with self care tasks. OT Exercises  Exercise Treatment: Instruction and participation in simple B UE AROM exercises, while seated at EOB, in tolerable planes, including B forearms, wrists, and digits, to increase/maintain general str and ROM to allow for most effective and safe daily self care performances. Dynamic Sitting Balance Exercises: Instruction and participation in simple functional reaching/core strengthening exercises while sitting unsupported at EOB, to improve her balance in order to safely complete both UB/LB self care. Patient Education  Education Given To: Patient; Family  Education Provided: Role of Therapy;Plan of Care;Transfer Training; Fall Prevention Strategies  Education Method: Demonstration;Verbal  Barriers to Learning: Cognition (But cognition is improving since started antibiotic)  Education Outcome: Verbalized understanding;Continued education needed    Goals  Short Term Goals  Time Frame for Short term goals: By discharge  Short Term Goal 1: Pt will complete lower body dressing/bathing/toileting with Supervision and Good safety   Short Term Goal 2: Pt will complete functional transfers/mobility during self care tasks wtih Supervision and Good safety  Short Term Goal 3: Pt will tolerate standing 5+ mintues during functional activity of choice with Supervision and Good safety while maintaining SpO2 above 90%  Short Term Goal 4: Pt will verbalize/demonstrate good understanding of home safety/fall prevention strategies to increase safety and independence with self care and mobility  Short Term Goal 5: Pt will participate in 15+ minutes of therapeutic exercises/functional activities to increase safety and independence with self care and mobility       Therapy Time   Individual Concurrent Group Co-treatment   Time In 1307         Time Out 1339         Minutes [address] GARCIA/L

## 2022-04-27 NOTE — FLOWSHEET NOTE
04/27/22 1520   Encounter Summary   Encounter Overview/Reason  Volunteer Encounter   Service Provided For: Patient   Referral/Consult From: Nhi   Last Encounter  04/27/22  (V)   Complexity of Encounter Low   Spiritual/Emotional needs   Type Spiritual Support   Rituals, Rites and Sacraments   Type Alevism Communion   Assessment/Intervention/Outcome   Intervention Prayer (assurance of)/[place]

## 2022-04-27 NOTE — PROGRESS NOTES
This is the result of the CXR: Worsened pulmonary vascular congestion and bilateral hazy airspace opacities which could reflect worsening pulmonary edema or could reflect multifocal pneumonia in the appropriate clinical setting. Patient is currently not on any antibiotics. Lactic acid is normal 1.0. Procalcitonin was elevated at 1.6. Orders received for IV Rocephin and IV Zithromax.

## 2022-04-27 NOTE — CARE COORDINATION
[facility] is still reviewing pt's clinicals. Reena at [facility] yamini Mar[facility]aga can accept pt clinically, they just have to see if they can obtain transport for dialysis. SW following for which facility can formally accept.

## 2022-04-27 NOTE — PROGRESS NOTES
Physical Therapy  [facility]  Physical Therapy Progress Note    Date: 22  Patient Name: Abdoulaye Hirsch       Room:   MRN: [id number]   Account: [de-identified]   : 1960  (60 y.o.) Gender: female        Diagnosis: DKA  Past Medical History:  has a past medical history of Backache, unspecified, CHF (congestive heart failure) ([facility] [address] Chronic kidney disease, Coronary atherosclerosis of artery bypass graft, COVID, Cramp of limb, Gallstones, Hypertension, Insomnia, Pneumonia, Type II or unspecified type diabetes mellitus with renal manifestations, not stated as uncontrolled(250.40), Type II or unspecified type diabetes mellitus without mention of complication, not stated as uncontrolled, and Unspecified vitamin D deficiency. Past Surgical History:   has a past surgical history that includes Coronary artery bypass graft; Knee arthroscopy; Carpal tunnel release; Breast surgery; Tonsillectomy; Hand surgery; Ankle fracture surgery; Cholecystectomy, open (N/A); IR TUNNELED CVC PLACE WO SQ PORT/PUMP > 5 YEARS (2021); AV fistula creation (2021); Dialysis fistula creation (Left, 2021); and other surgical history (2022). Additional Pertinent Hx: The patient is a 57 y.o. female who presents to [facility] with complaints of shortness of breath. She has a known hx of CHF, DM2 and ESRD on hemodialysis. She states that the SOB has been progressively worsening over the past few days. SOB was not relieved by Dialysis treatment. Overall Orientation Status: Impaired  Restrictions/Precautions  Restrictions/Precautions: General Precautions; Fall Risk;Up as Tolerated  Required Braces or Orthoses?: Yes    Subjective: Patient sitting up in bed upon arrival, finishing breakfast; agreeable to therapy   Comments: DEDE Blanco approved therapy     Vital Signs  Pulse: 73  Heart Rate Source: Monitor  BP: 118/60  BP Location: Right upper arm  MAP (Calculated): 79.33  Patient Position: Sitting  Pain Assessment: None - Denies Pain     Oxygen Therapy  SpO2: 95 %  Pulse Oximeter Device Mode: Intermittent  Pulse Oximeter Device Location: Finger  O2 Device: Nasal cannula  O2 Flow Rate (L/min): 3 L/min     Bed Mobility  Rolling: Stand by assistance  Supine to Sit: Stand by assistance (HOB elevated )  Sit to Supine: Stand by assistance  Scooting: Stand by assistance  Comment: HOB flat for all bed mobility unless otherwise noted   Bed mobility  Scooting: Stand by assistance    Transfers:  Sit to Stand: Stand by assistance  Stand to sit: Stand by assistance    EXERCISES    Other exercises?: Yes  Other exercises 1: bed mobility x2; while performing supine to sit, patient would often \"space out\" and would require redirection to complete task   Other exercises 2: STS x1; patient required extra time d/t increased anxiety associated with standing   Other exercises 3: patient was incontinent of urine upon standing. Brief changed and purewick placed once patient was back in bed   Other exercises 4: rolling L/R for brief placement   Other exercises 5: standing tolernace ~2 minutes            Activity Tolerance: Patient tolerated treatment well,Patient limited by endurance,Treatment limited secondary to decreased cognition  PT Equipment Recommendations  Equipment Needed: No     Current Treatment Recommendations: Strengthening,Balance training,Functional mobility training,Transfer training,Endurance training,Gait training    Conditions Requiring Skilled Therapeutic Intervention  Body Structures, Functions, Activity Limitations Requiring Skilled Therapeutic Intervention: Decreased functional mobility ; Decreased strength;Decreased endurance;Decreased balance;Decreased posture  Treatment Diagnosis: Impaired functional mobility and endurance 2* fatigue and nausea associated with Diabetic Ketoacidosis  History: H/O CVA- was in ARU in Nov,Dec, of 2021 and continued OP Therapy; CHF, Type 2 DM, Hemodialysis on T/TH  Discharge Recommendations: Patient would benefit from continued therapy after discharge;Subacute/Skilled Nursing Facility    Goals  Short Term Goals  Time Frame for Short term goals: 5 days  Short term goal 1: pt to demo independent bed mobility   Short term goal 2: pt to perform all Transfers with Rollator (or RW), Mod I  Short term goal 3: pt to ambulate household distances of 54-65' with Rollator (or RW) to improve independence and safety with mobility.    Short term goal 4: pt to improve standing balance to good with Use of AD  Short term goal 5: pt to improve standing tolerance to 10 minutes to improve tolerance for daily activities and ADLs       04/27/22 1210   PT Individual Minutes   Time In 0858   Time Out 0945   Minutes 47       Electronically signed by Alfred Ragsdale PTA on 4/27/22 at 12:11 PM EDT

## 2022-04-27 NOTE — CARE COORDINATION
[address] [place] [address] is unable to accept pt due to her insurance being a Commercial insurance. [address] [place] [address] can only accept [facility]. Reena at [facility] can accept and is able to transport pt to and from dialysis. BONNIE spoke with pt and pt's sister at bedside and they are agreeable to [facility] at [facility]. Pre-cert was started on this date, 4/27/2022.

## 2022-04-27 NOTE — PROGRESS NOTES
Nephrology Progress Note    Reason for consultation: Management of hemodialysis dependent end-stage renal disease. Consulting physician: Brett Mathews MD    Interval history: Patient was seen and examined today and she does not have shortness of breath. She was working with physical therapy and strenght has been slowly improving. History of Present Illness: This is a 57 y.o. female with hypertension, type 2 diabetes mellitus, end-stage renal disease on hemodialysis   who was recently taken off dialysis about 6 weeks ago due to regaining residual kidney function. Over the course of the last 2 weeks patient  was restarted on hemodialysis due to worsening fluid overload. Initially had problems with her  tunneled catheter which was replaced last week- patient was dialyzed on Saturday, 4/9/2021 and yesterday-4/13/22  at the outpatient dialysis unit. Patient presented with complaints of shortness of breath over the last 2 to 3 days progressively worsening. Patient had associated  Nausea, chest pain but no vomiting. She is on 3 L nasal cannula chronically at home. Patient was found to be in acute DKA with blood sugars greater than 500 was started on the modified DKA protocol. Blood pressures on admission were elevated above 200s and her chest x-ray showed evidence of pulmonary vascular congestion. Labs showed a potassium of 3.3 with BUN/creatinine of 18 and 1.78 mg/dL, bicarbonate of 18 and troponin of 78. Beta hydroxybutyrate was 7.57.     Objective/     Vitals:    04/26/22 1950 04/27/22 0010 04/27/22 0400 04/27/22 0700   BP:  (!) 114/45 (!) 109/54 (!) 110/48   Pulse:  71 66 63   Resp:  18 18 18   Temp:  99.4 °F (37.4 °C) 98.3 °F (36.8 °C) 97.7 °F (36.5 °C)   TempSrc:  Axillary Axillary Axillary   SpO2: 91% 96% 98%    Weight:       Height:         24HR INTAKE/OUTPUT:      Intake/Output Summary (Last 24 hours) at 4/27/2022 1150  Last data filed at 4/27/2022 0904  Gross per 24 hour   Intake 500 ml Output 2000 ml   Net -1500 ml     Patient Vitals for the past 96 hrs (Last 3 readings):   Weight   04/26/22 1408 248 lb 0.3 oz (112.5 kg)   04/26/22 1052 251 lb 5.2 oz (114 kg)   04/24/22 0500 257 lb 8 oz (116.8 kg)     Constitutional: Alert and oriented x3  Cardiovascular:  S1, S2 without pericardial rub or gallop. Respiratory: Diminished breath sounds at lung bases but no rhonchi. Abdomen: Full, soft with normal bowel sounds. Extremities: 1+ LE edema    Data/  Recent Labs     04/25/22  0523 04/26/22  0548 04/27/22  0553   WBC 4.4 6.3 3.5   HGB 9.5* 10.2* 8.6*   HCT 28.5* 31.0* 25.5*   MCV 93.2 93.7 91.8   * 176 149*     Recent Labs     04/25/22  0617 04/26/22  0548 04/27/22  0553    135 134*   K 4.3 4.3 3.8   CL 96* 96* 97*   CO2 27 27 26   GLUCOSE 109* 126* 127*   PHOS 4.2  --   --    BUN 24* 31* 22   CREATININE 3.51* 3.40* 2.78*   LABGLOM 13* 14* 17*   GFRAA 16* 16* 21*     Assessment/Plan:     1. End-stage renal disease - on hemodialysis by way of a right-sided tunneled catheter, transiently taken off dialysis 6 weeks ago and re- started due to worsening fluid overload 1 week ago. She still has some residual renal function [500 mL urine per day]. We will maintain TTS hemodialysis schedule. Renal diet,i.e 2-gram sodium,2-gram potassium,1500 ml fluid restriction,1-gram phosphorus, 1800 KCal and 1.2 gram protein per day.     2. Systemic hypertension - Blood pressure control is adequate.     3. Normocytic anemia of chronic kidney disease - Hemoglobin has trended down to 8.6 g/dL today. We will start Retacrit 3000 units subcutaneously 3 times a week. 4.  Mineral and bone disease profile - Serum phosphorus 4.2 mg/dL [4/25/2022] is within target range    Prognosis is guarded.     Jessica Montez FACP  Attending Clinical Nephrologist

## 2022-04-27 NOTE — PROGRESS NOTES
Jason Bhatt MD/Carter Lopez MD/ Malou Segura MD/Dr Torrie Silveira AGACNP-BC, NP-C      Everardo Danielson APRN NP-C     Louisa SHELBY NP-C                                           Pulmonary Progress Note    Patient - Ludwig Ovalle   Age - 57 y.o.   - 1960  MRN - [id number]  Acct # - [de-identified]  Date of Admission - 2022  3:17 PM    Consulting Service/Physician:       Primary Care Physician: AFSANEH Ca - CNP    SUBJECTIVE:     Chief Complaint:   Chief Complaint   Patient presents with    Shortness of Breath     Subjective:    [place] states she is doing slightly better today. She seems to have less confusion. She is currently on 3 L nasal cannula pulse ox 98%. She did have a fever overnight of 103.1. She had some mild desaturation during her fever. She was started on Rocephin and Zithromax per primary service. Chest x-ray yesterday showed worsening vascular congestion with possible infiltrate. She did have hemodialysis yesterday. VITALS  BP (!) 110/48   Pulse 63   Temp 97.7 °F (36.5 °C) (Axillary)   Resp 18   Ht 5' 5\" (1.651 m)   Wt 248 lb 0.3 oz (112.5 kg)   SpO2 98%   BMI 41.27 kg/m²   Wt Readings from Last 3 Encounters:   22 248 lb 0.3 oz (112.5 kg)   22 248 lb (112.5 kg)   22 250 lb (113.4 kg)     I/O (24 Hours)    Intake/Output Summary (Last 24 hours) at 2022 0949  Last data filed at 2022 8164  Gross per 24 hour   Intake 860 ml   Output 2000 ml   Net -1140 ml     Ventilator:      Exam:   Physical Exam   Constitutional: Obese female lying in bed in no acute distress  HENT: Unremarkable  Head: Normocephalic and atraumatic. Eyes: EOM are normal. Pupils are equal, round, and reactive to light. Neck: Neck supple. Cardiovascular:  Regular rate and rhythm. Normal heart tones. No JVD.     Pulmonary/Chest: Respirations even and unlabored, lungs diminished at the bases, on 3 L nasal cannula pulse ox 98%. Abdominal: Soft. Bowel sounds are normal.  Obese abdomen  Musculoskeletal: Normal range of motion. Neurological: Patient is alert and oriented to person, place, and time. She is able to answer questions today and is appropriate. Skin: Skin is warm and dry. No rash noted.    Extremities: Trace edema   Infusions:      sodium chloride 25 mL (04/20/22 1212)     Meds:     Current Facility-Administered Medications:     traZODone (DESYREL) tablet 75 mg, 75 mg, Oral, Nightly, El Chirri, DO, 75 mg at 04/26/22 2040    cefTRIAXone (ROCEPHIN) 1000 mg IVPB in 50 mL D5W minibag, 1,000 mg, IntraVENous, Q24H, Guilherme Pressley MD, Stopped at 04/27/22 0010    azithromycin (ZITHROMAX) 500 mg in D5W 250ml addavial, 500 mg, IntraVENous, Q24H, Guilherme Pressley MD, Stopped at 04/27/22 0230    apixaban (ELIQUIS) tablet 5 mg, 5 mg, Oral, BID, Zoey Pagan MD, 5 mg at 04/27/22 0836    insulin glargine (LANTUS) injection vial 50 Units, 50 Units, SubCUTAneous, BID, Guilherme Pressley MD, 50 Units at 04/27/22 0834    insulin lispro (HUMALOG) injection vial 0-18 Units, 0-18 Units, SubCUTAneous, TID WC, Guilherme Pressley MD, 3 Units at 04/26/22 1651    insulin lispro (HUMALOG) injection vial 0-9 Units, 0-9 Units, SubCUTAneous, Nightly, Guilherme Pressley MD, 3 Units at 04/26/22 2055    calcium carbonate (TUMS) chewable tablet 500 mg, 500 mg, Oral, TID PRN, Guilherme Pressley MD, 500 mg at 04/23/22 1037    furosemide (LASIX) tablet 80 mg, 80 mg, Oral, Daily, Elizabeth Ortega MD, 80 mg at 04/27/22 0836    polyethylene glycol (GLYCOLAX) packet 17 g, 17 g, Oral, Daily PRN, Zoey Pagan MD, 17 g at 04/23/22 1038    aspirin EC tablet 81 mg, 81 mg, Oral, Daily, Astrid Gifford MD, 81 mg at 04/27/22 0836    sodium chloride flush 0.9 % injection 5-40 mL, 5-40 mL, IntraVENous, 2 times per day, Yajaira Martel DO, 10 mL at 04/27/22 0837    sodium chloride flush 0.9 % injection 5-40 mL, 5-40 mL, IntraVENous, PRN, Lucy Bushtita, DO    0.9 % sodium chloride infusion, , IntraVENous, PRN, Lucy Bushy, DO, Last Rate: 25 mL/hr at 04/20/22 1212, 25 mL at 04/20/22 1212    ondansetron (ZOFRAN-ODT) disintegrating tablet 4 mg, 4 mg, Oral, Q8H PRN **OR** ondansetron (ZOFRAN) injection 4 mg, 4 mg, IntraVENous, Q6H PRN, Lucy Lopez, DO    ranolazine [facility]) extended release tablet 1,000 mg, 1,000 mg, Oral, BID, Neha Wing MD, 1,000 mg at 04/26/22 2146    busPIRone (BUSPAR) tablet 7.5 mg, 7.5 mg, Oral, TID, Neha Wing MD, 7.5 mg at 04/27/22 0838    pantoprazole (PROTONIX) tablet 40 mg, 40 mg, Oral, QAM AC, Neha Wing MD, 40 mg at 04/27/22 7777    atorvastatin (LIPITOR) tablet 80 mg, 80 mg, Oral, Daily, Neha Wing MD, 80 mg at 04/27/22 0836    febuxostat (ULORIC) tablet 40 mg, 40 mg, Oral, Daily, Neha Wing MD, 40 mg at 04/26/22 1655    docusate sodium (COLACE) capsule 100 mg, 100 mg, Oral, BID, Neha Wing MD, 100 mg at 04/27/22 0836    [Held by provider] tamsulosin (FLOMAX) capsule 0.4 mg, 0.4 mg, Oral, Daily, Neha Wing MD, 0.4 mg at 04/18/22 0815    [Held by provider] insulin lispro (HUMALOG) injection vial 15 Units, 15 Units, SubCUTAneous, TID AC, Neha Wing MD, 15 Units at 04/16/22 1713    sodium bicarbonate tablet 650 mg, 650 mg, Oral, TID, Neha Wing MD, 650 mg at 04/27/22 0836    [Held by provider] gabapentin (NEURONTIN) capsule 300 mg, 300 mg, Oral, BID, Neha Wing MD, 300 mg at 04/25/22 2137    melatonin tablet 9 mg, 9 mg, Oral, Nightly, Neha Wing MD, 9 mg at 04/26/22 2040    lidocaine-prilocaine (EMLA) cream, , Topical, PRN, Neha Wing MD    carvedilol (COREG) tablet 3.125 mg, 3.125 mg, Oral, BID, Neha Wing MD, 3.125 mg at 04/27/22 0836    sodium phosphate 10 mmol in dextrose 5 % 250 mL IVPB, 10 mmol, IntraVENous, Once, Sabra Taylor MD    anticoagulant sodium citrate 4 % injection 1.6 mL, 1.6 mL, IntraCATHeter, PRN, Negrita Elizabeth MD, 1.6 mL at 04/26/22 1414    anticoagulant sodium citrate 4 % injection 1.6 mL, 1.6 mL, IntraCATHeter, PRN, Negrita Elizabeth MD, 1.6 mL at 04/26/22 1414    glucose (GLUTOSE) 40 % oral gel 15 g, 15 g, Oral, PRN, Ivana Barrera MD    dextrose 50 % IV solution, 12.5 g, IntraVENous, PRN, Ivana Barrera MD    glucagon (rDNA) injection 1 mg, 1 mg, IntraMUSCular, PRN, Ivaan Barrera MD    miconazole (MICOTIN) 2 % powder, , Topical, BID, Ivana Barrera MD, Given at 04/26/22 2113    acetaminophen (TYLENOL) tablet 650 mg, 650 mg, Oral, Q4H PRN, Marylen Crown, DO, 650 mg at 04/26/22 2041    Lab Results:     Lab Results   Component Value Date    WBC 3.5 04/27/2022    HGB 8.6 (L) 04/27/2022    HCT 25.5 (L) 04/27/2022    MCV 91.8 04/27/2022     (L) 04/27/2022     Lab Results   Component Value Date    CALCIUM 8.8 04/27/2022     (L) 04/27/2022    K 3.8 04/27/2022    CO2 26 04/27/2022    CL 97 (L) 04/27/2022    BUN 22 04/27/2022    CREATININE 2.78 (H) 04/27/2022       Lab Results   Component Value Date    INR 0.9 11/10/2021    PROTIME 12.7 11/10/2021       Radiology:     4/26/2022 4/24/2022  ASSESSMENT:     1. RLL pneumonia/atelectasis versus fluid volume excess  2. DKA - resolved  3. Altered mental status, improved  4. HTN urgency  5. UTI-completed ceftriaxone, culture with no signficant growth  6. ESRD on hemodialysis, TTS  7. Acute on Chronic diastolic heart failure-Grade 1  8. Syncopal episode   9. Chronic hypoxic respiratory insufficiency-on 2 L chronically  10. ERICK (clinical diagnosis)  11. Obesity  12. Full code    PLAN:   1. Wean oxygen to keep pulse ox above 91%  2. Hemodialysis per nephrology, may benefit from extra fluid removal  3. Would benefit from outpatient sleep study  4. She previously completed a course of Rocephin for UTI, given her admission was on 4/13 we will change antibiotics to IV cefepime  5.  Unable to follow-up with our office outpatient secondary to insurance, would benefit from outpatient follow-up with [facility] pulmonology after discharge      Electronically signed by AFSANEH Perez CNP on 04/27/22     This progress note was completed using a voice transcription system. Every effort was made to ensure accuracy. However, inadvertent computerized transcription errors may be present.     Sen Mc, NP-C, MSN  [facility]

## 2022-04-27 NOTE — PROGRESS NOTES
[facility] Neurology   IN-PATIENT SERVICE      NEUROLOGY PROGRESS  NOTE            Date:   4/27/2022  Patient name:  Miguel Angel Gunter  Date of admission:  4/13/2022  YOB: 1960      Interval History:     Patient appears today. She is alert and oriented at this time. The myoclonic jerks appear to be improved. MRI brain repeated and does not reveal any acute abnormalities. Reportedly patient slept well overnight. History of Present Illness: The patient is a 58 y. o. female who presents with Shortness of Breath  . The patient was seen and examined and the chart was reviewed.  Patient was initially being treated for AURELIA, CHF, lymphedema.  Neurology was consulted due to episode of decreased responsiveness with blank staring.  She then underwent MRI brain and MRA head and neck after this episode which not reveal any significant or acute abnormalities. 4/25:   Patient reportedly has been more confused, saying words out of context and also increase of asterixis over the last 24 hours or so.  Neurology has been reconsulted. ABG showed lower PO2 level but CO2 level is not significantly elevated.  Ammonia level is within normal limits.  Urinalysis has been sent out and is pending.     Currently patient is awake and alert.  She is able to answer some questions appropriately and other times will answer incorrectly.  There does appear to be speech perseveration present. Jose F Luke is prominent negative myoclonus present in the upper and lower extremities which reportedly is worse than her baseline.  There does appear to be a component of right hemineglect as well.     Past Medical History:     Past Medical History:   Diagnosis Date    Backache, unspecified     CHF (congestive heart failure) (HCC)     Chronic kidney disease     Coronary atherosclerosis of artery bypass graft     COVID 1/31/2022    Cramp of limb     Gallstones     Hypertension     Insomnia     Pneumonia     Type II or unspecified type diabetes mellitus with renal manifestations, not stated as uncontrolled(250.40)     Type II or unspecified type diabetes mellitus without mention of complication, not stated as uncontrolled     Unspecified vitamin D deficiency         Past Surgical History:     Past Surgical History:   Procedure Laterality Date    ANKLE FRACTURE SURGERY      AV FISTULA CREATION  12/14/2021    BREAST SURGERY      CARPAL TUNNEL RELEASE      CHOLECYSTECTOMY, OPEN N/A     CORONARY ARTERY BYPASS GRAFT      x3    DIALYSIS FISTULA CREATION Left 12/14/2021    LEFT AV FISTULA CREATION UPPER EXTREMITY [facility] TUNNELED CATHETER PLACEMENT GREATER THAN 5 YEARS  8/18/2021    IR TUNNELED CATHETER PLACEMENT GREATER THAN 5 YEARS 8/18/2021 [facility] SPECIAL PROCEDURES    KNEE ARTHROSCOPY      right    OTHER SURGICAL HISTORY  04/06/2022    cvc exchange    TONSILLECTOMY          Medications during admission:      [START ON 4/28/2022] cefepime  1,000 mg IntraVENous Daily    traZODone  75 mg Oral Nightly    apixaban  5 mg Oral BID    insulin glargine  50 Units SubCUTAneous BID    insulin lispro  0-18 Units SubCUTAneous TID WC    insulin lispro  0-9 Units SubCUTAneous Nightly    furosemide  80 mg Oral Daily    aspirin  81 mg Oral Daily    sodium chloride flush  5-40 mL IntraVENous 2 times per day    ranolazine  1,000 mg Oral BID    busPIRone  7.5 mg Oral TID    pantoprazole  40 mg Oral QAM AC    atorvastatin  80 mg Oral Daily    febuxostat  40 mg Oral Daily    docusate sodium  100 mg Oral BID    [Held by provider] tamsulosin  0.4 mg Oral Daily    [Held by provider] insulin lispro  15 Units SubCUTAneous TID AC    sodium bicarbonate  650 mg Oral TID    [Held by provider] gabapentin  300 mg Oral BID    melatonin  9 mg Oral Nightly    carvedilol  3.125 mg Oral BID    sodium phosphate IVPB  10 mmol IntraVENous Once    miconazole   Topical BID Physical Exam:   /65   Pulse 79   Temp 99.1 °F (37.3 °C) (Oral)   Resp 20   Ht 5' 5\" (1.651 m)   Wt 248 lb 0.3 oz (112.5 kg)   SpO2 95%   BMI 41.27 kg/m²   Temp (24hrs), Av.5 °F (37.5 °C), Min:97.7 °F (36.5 °C), Max:103.1 °F (39.5 °C)          Neurological examination:    Mental status   Alert and oriented x 3; following all commands; speech is fluent, no dysarthria, aphasia. Cranial nerves   II - visual fields intact to confrontation; pupils reactive  III, IV, VI - extraocular muscles intact; no CECELIA; no nystagmus; no ptosis   V - normal facial sensation                                                               VII - normal facial symmetry                                                             VIII - intact hearing                                                                             IX, X - symmetrical palate elevation                                               XI - symmetrical shoulder shrug                                                       XII - midline tongue without atrophy or fasciculation     Motor function   occasional negative myoclonus present in upper and lower extremities upon any type of movement. She is able to hold her arms antigravity bilaterally. Lower extremities 2/5 strength. Sensory function Intact to touch, pin, vibration, proprioception throughout     Cerebellar Intact finger-nose-finger testing. Intact heel-shin testing. No dysdiadochokinesia present. Reflex function 2/4 symmetric throughout . Downgoing plantar response bilaterally.  (-)Meza's sign bilaterally    Gait                   not assessed             Diagnostics:      Laboratory Testing:  CBC:   Recent Labs     22  0523 22  0548 22  0553   WBC 4.4 6.3 3.5   HGB 9.5* 10.2* 8.6*   * 176 149*     BMP:    Recent Labs     22  0617 22  0548 22  0553    135 134*   K 4.3 4.3 3.8   CL 96* 96* 97*   CO2  26   BUN 24* 31* 22 CREATININE 3.51* 3.40* 2.78*   GLUCOSE 109* 126* 127*         Lab Results   Component Value Date    CHOL 105 04/09/2015    LDLCHOLESTEROL 72 12/26/2012    HDL 43 04/09/2015    TRIG 168 04/09/2015    ALT 11 04/26/2022    AST 19 04/26/2022    TSH 0.41 04/25/2022    INR 0.9 11/10/2021    LABA1C 11.3 (H) 04/05/2022    LABMICR 538 12/26/2012    [id number] 459 12/26/2012         Imaging/Diagnostics:      EEG: Moderate encephalopathy of nonspecific etiology. No epileptiform activity. Results for orders placed during the hospital encounter of 12/23/21    MRI CERVICAL SPINE WO CONTRAST    Narrative  EXAMINATION:  MRI OF THE CERVICAL SPINE WITHOUT CONTRAST 12/28/2021 4:43 pm    TECHNIQUE:  Multiplanar multisequence MRI of the cervical spine was performed without the  administration of intravenous contrast.    COMPARISON:  11/15/2021    HISTORY:  ORDERING SYSTEM PROVIDED HISTORY: leg weakness  TECHNOLOGIST PROVIDED HISTORY:  leg weakness  Reason for Exam: leg weakness  Additional signs and symptoms: patient complains of lower back pain    FINDINGS:  BONES/ALIGNMENT: There is straightening of the normal cervical lordosis. No  significant listhesis. Prior anterior fixation from C5-C7. Prior C6  corpectomy with interbody graft. There is associated hardware artifact. No  aggressive marrow signal abnormality. SPINAL CORD: No abnormal cord signal is seen. SOFT TISSUES: No paraspinal mass identified. 1.6 cm right thyroid nodule. C2-C3: There is no significant disc protrusion, spinal canal stenosis or  neural foraminal narrowing. C3-C4: There is no significant disc protrusion, spinal canal stenosis or  neural foraminal narrowing. C4-C5: No significant spinal canal stenosis. Uncovertebral facet DJD with  mild to moderate bilateral neural foraminal stenosis. C5-C6: Postoperative level without significant spinal canal stenosis.   Uncovertebral facet DJD with moderate to severe bilateral neural foraminal  stenosis. C6-C7: Postoperative level without significant spinal canal or neural  foraminal stenosis. C7-T1: There is no significant disc protrusion, spinal canal stenosis or  neural foraminal narrowing. Impression  1. Postoperative changes from C5-C7 without significant spinal canal stenosis. 2. Neural foraminal narrowing as above. 3. 1.6 cm right thyroid nodule. Ultrasound is recommended for further  characterization. RECOMMENDATIONS:  Managing Incidental Thyroid Nodule Detected at CT or MRI or US    1. Further evaluation by thyroid Ultrasound recommended for these incidental  nodules:    Patient Age 25 years or less - Nodule of any size    Patient Age 21-27 years old - Nodule 1 cm in size or greater    Patient Age 28 years or more - Nodule 1.5 cm in size or greater    2. Follow up thyroid ultrasound also recommend in these scenarios    - Solitary nodule with high risk imaging features (locally invasive nodule or  suspicious lymph nodes)    - Heterogeneous, enlarged thyroid gland.    - Increased uptake on PET    3.  NO further imaging is recommended in the following scenarios    - Any nodule not meeting above criteria. - Those patients with limited life expectancy or significant co-morbidities. Note: These recommendations do not apply to pts. w/ increased risk for  thyroid cancer or pts. with symptomatic thyroid disease. Reference: Recommendations for f/u of Incidental Thyroid Nodules (ITN) found  on CT, MR, NM and Extrathyroidal US are based upon the ACR white paper and  [facility] 3-tiered system for managing ITNs:J Am Toy Radiol.  2015 Feb;12(2):  143-50    Unavailable    Results for orders placed during the hospital encounter of 04/13/22    MRA HEAD WO CONTRAST    Narrative  EXAMINATION:  MRA OF THE HEAD WITHOUT CONTRAST; MRI OF THE BRAIN WITHOUT CONTRAST 4/18/2022  6:01 pm; 4/18/2022 5:15 pm:    TECHNIQUE:  MRA of the head was performed utilizing time-of-flight imaging with MIP  images. No intravenous contrast was administered.; Multiplanar multisequence  MRI of the brain was performed without the administration of intravenous  contrast.    COMPARISON:  12/28/2021    HISTORY:  ORDERING SYSTEM PROVIDED HISTORY: double vision  TECHNOLOGIST PROVIDED HISTORY:  double vision  Reason for Exam: Pt c/o double vision and right arm tingling, had episode of  staring and unresponsiveness earlier this afternoon. Pt  has had multiple  Brain MRI's since Sept 2021. FINDINGS:    MRI BRAIN:    INTRACRANIAL STRUCTURES/VENTRICLES: No acute infarct or mass. Stable mild  chronic white matter microvascular ischemic changes. No mass effect or  midline shift. No evidence of an acute intracranial hemorrhage. The  ventricles and sulci are normal in size and configuration. The  sellar/suprasellar regions appear unremarkable. The normal signal voids  within the major intracranial vessels appear maintained. ORBITS: Bilateral intra-ocular lens implants. Otherwise, normal.    SINUSES: The visualized paranasal sinuses and mastoid air cells are well  aerated. BONES/SOFT TISSUES: The bone marrow signal intensity appears normal. The soft  tissues demonstrate no acute abnormality. MRA HEAD:    ANTERIOR CIRCULATION: The internal carotid arteries are normal in course and  caliber without focal stenosis. The anterior cerebral and middle cerebral  arteries demonstrate no focal stenosis. POSTERIOR CIRCULATION: The posterior cerebral arteries demonstrate no focal  stenosis. The vertebral and basilar arteries appear unremarkable. ANEURYSM: No intracranial aneurysm is seen. Impression  1. No acute intracranial abnormality. 2. Mild chronic white matter microvascular ischemic changes. 3. Normal MRA of the head. No results found for this or any previous visit. No results found for this or any previous visit.     Results for orders placed during the hospital encounter of 04/13/22    MRI BRAIN WO CONTRAST    Narrative  EXAMINATION:  MRI OF THE BRAIN WITHOUT CONTRAST  4/26/2022 3:28 pm    TECHNIQUE:  Multiplanar multisequence MRI of the brain was performed without the  administration of intravenous contrast.    COMPARISON:  04/18/2022    HISTORY:  ORDERING SYSTEM PROVIDED HISTORY: rule out stroke. R hemineglect. TECHNOLOGIST PROVIDED HISTORY:  rule out stroke. R hemineglect. Reason for Exam: rule out stroke. R hemineglect. Additional signs and symptoms: poor historian, pt seems confused    FINDINGS:  There is no acute infarction, intracranial hemorrhage, or intracranial mass  lesion. No mass effect, midline shift, or extra-axial collection is noted. There are mild nonspecific foci of periventricular and subcortical cerebral  white matter T2/FLAIR hyperintensity, most likely representing chronic  microangiopathic disease in this age group. The brain parenchyma is otherwise  normal. The pituitary gland is normal in appearance. The cerebellar tonsils  are in normal position. The ventricles, sulci, and cisterns are prominent suggestive of generalized  volume loss. The intracranial flow voids are preserved. The globes and orbits are within normal limits. The visualized extracranial  structures including paranasal sinuses and mastoid air cells are unremarkable. Impression  There is no acute infarction, intracranial hemorrhage, or intracranial mass  lesion. Mild chronic microangiopathic ischemic disease. Mild generalized volume loss. RECOMMENDATIONS:  Unavailable    No results found for this or any previous visit. No results found for this or any previous visit.     Results for orders placed during the hospital encounter of 12/23/21    CT HEAD WO CONTRAST    Narrative  EXAMINATION:  CT OF THE HEAD WITHOUT CONTRAST  12/24/2021 2:40 am    TECHNIQUE:  CT of the head was performed without the administration of intravenous  contrast. Dose modulation, iterative reconstruction, and/or weight based  adjustment of the mA/kV was utilized to reduce the radiation dose to as low  as reasonably achievable. COMPARISON:  11/10/2021    HISTORY:  ORDERING SYSTEM PROVIDED HISTORY: AMS  TECHNOLOGIST PROVIDED HISTORY:    AMS  Reason for Exam: AMS    FINDINGS:  BRAIN/VENTRICLES: There is no acute intracranial hemorrhage, mass effect or  midline shift. No abnormal extra-axial fluid collection. The gray-white  differentiation is maintained without evidence of an acute infarct. There is  no evidence of hydrocephalus. Mild diffuse cerebral atrophy and chronic white  matter ischemic change. ORBITS: The visualized portion of the orbits demonstrate no acute  abnormality. Incidental note of focal posterior retinal calcifications on  the right not consistent with drusen. SINUSES: The visualized paranasal sinuses and mastoid air cells demonstrate  no acute abnormality. SOFT TISSUES/SKULL:  No acute abnormality of the visualized skull or soft  tissues. Impression  No acute intracranial abnormality. RECOMMENDATIONS:  Unavailable      I personally reviewed all of the above medications, clinical laboratory, imaging and other diagnostic tests. Impression:      1. Acute metabolic encephalopathy, hospital delirium, myoclonic jerks secondary to underlying infection, sleep deprivation    Plan:      Continue trazodone 75 mg nightly. Avoid distractions at night to help patient with sleep.  Continue to hold gabapentin as myoclonic jerks are improved today.  Patient is stable neurologically for discharge at this point in time.  We will sign off, please do not hesitate to contact with any further questions or concerns.         Electronically signed by Carolina Norton DO on 4/27/2022 at 4:27 PM      Carolina Norton [address] [place] [address]

## 2022-04-27 NOTE — PROGRESS NOTES
Dr. Allen Rivera notified that Pt is running a temp of 103.1 and O2 sat is 89% on 2.5L, we increased O2 to 3L and Sat is 91% now. We are still waiting to collect urine for culture. Orders received for lactic acid, procalcitonin and CXR. CXR was already completed and awaiting results.

## 2022-04-27 NOTE — PLAN OF CARE
Problem: Falls - Risk of:  Goal: Will remain free from falls  Description: Will remain free from falls  Outcome: Progressing     Problem: Falls - Risk of:  Goal: Absence of physical injury  Description: Absence of physical injury  Outcome: Progressing     Problem: Skin Integrity:  Goal: Will show no infection signs and symptoms  Description: Will show no infection signs and symptoms  Outcome: Progressing     Problem: Skin Integrity:  Goal: Absence of new skin breakdown  Description: Absence of new skin breakdown  Outcome: Progressing     Problem: Musculor/Skeletal Functional Status  Goal: Absence of falls  Outcome: Progressing     Problem: Pain:  Goal: Pain level will decrease  Description: Pain level will decrease  Outcome: Progressing

## 2022-04-27 NOTE — PLAN OF CARE
Problem: Falls - Risk of:  Goal: Will remain free from falls  Description: Will remain free from falls  Outcome: Progressing     Problem: Falls - Risk of:  Goal: Absence of physical injury  Description: Absence of physical injury  Outcome: Progressing     Problem: Skin Integrity:  Goal: Will show no infection signs and symptoms  Description: Will show no infection signs and symptoms  Outcome: Progressing     Problem: Skin Integrity:  Goal: Absence of new skin breakdown  Description: Absence of new skin breakdown  Outcome: Progressing     Problem: Musculor/Skeletal Functional Status  Goal: Highest potential functional level  Outcome: Progressing     Problem: Musculor/Skeletal Functional Status  Goal: Absence of falls  Outcome: Progressing       Problem: Chronic Conditions and Co-morbidities  Goal: Patient's chronic conditions and co-morbidity symptoms are monitored and maintained or improved  Outcome: Progressing

## 2022-04-27 NOTE — PROGRESS NOTES
FAMILY MEDICINE  - PROGRESS NOTE    Date:  4/27/2022  [facility]  [id number]      Chief Complaint   Patient presents with    Shortness of Breath         Interval History:  Not changed, she is still intermittently confused. No significant events overnight. No significant events overnight. Specialists notes, labs & imaging reviewed.       Subjective  Constitutional: positive for obesity  Respiratory: positive for shortness of breath  Cardiovascular: positive for lower extremity edema  Musculoskeletal:positive for arthralgias and myalgias  Behavioral/Psych: positive for anxiety and depression  Endocrine: positive for diabetic symptoms including hyperglycemia:    Objective:    BP (!) 109/54   Pulse 66   Temp 98.3 °F (36.8 °C) (Axillary)   Resp 18   Ht 5' 5\" (1.651 m)   Wt 248 lb 0.3 oz (112.5 kg)   SpO2 98%   BMI 41.27 kg/m²   General appearance - well appearing and in no distress and overweight  Mental status - not in any distress  Eyes - extraocular eye movements intact  Ears - hearing grossly normal bilaterally  Nose - normal and patent, no erythema, discharge or polyps  Mouth - mucous membranes moist, pharynx normal without lesions  Neck - supple, no significant adenopathy  Lymphatics - no palpable lymphadenopathy, no hepatosplenomegaly  Chest - clear to auscultation, no wheezes, rales or rhonchi, symmetric air entry, decreased air entry noted posteriorly  Heart - normal rate, regular rhythm, normal S1, S2, no murmurs, rubs, clicks or gallops  Abdomen - soft, nontender, nondistended, no masses or organomegaly  Breasts - not examined  Back exam - not examined  Neurological - alert, oriented, normal speech, no focal findings or movement disorder noted  Musculoskeletal - no joint tenderness, deformity or swelling  Extremities - pedal edema 1 +  Skin - normal coloration and turgor, no rashes, no suspicious skin lesions noted    Data:   Medications: Current Facility-Administered Medications   Medication Dose Route Frequency Provider Last Rate Last Admin    traZODone (DESYREL) tablet 75 mg  75 mg Oral Nightly El Chirri, DO   75 mg at 04/26/22 2040    cefTRIAXone (ROCEPHIN) 1000 mg IVPB in 50 mL D5W minibag  1,000 mg IntraVENous Q24H Jaki Miranda MD   Stopped at 04/27/22 0010    azithromycin (ZITHROMAX) 500 mg in D5W 250ml addavial  500 mg IntraVENous Q24H Jaki Miranda MD   Stopped at 04/27/22 0230    apixaban (ELIQUIS) tablet 5 mg  5 mg Oral BID Jules King MD   5 mg at 04/26/22 2040    insulin glargine (LANTUS) injection vial 50 Units  50 Units SubCUTAneous BID Jaki Miranda MD   50 Units at 04/26/22 2055    insulin lispro (HUMALOG) injection vial 0-18 Units  0-18 Units SubCUTAneous TID WC Jaki Miranda MD   3 Units at 04/26/22 1651    insulin lispro (HUMALOG) injection vial 0-9 Units  0-9 Units SubCUTAneous Nightly Jaki Miranda MD   3 Units at 04/26/22 2055    calcium carbonate (TUMS) chewable tablet 500 mg  500 mg Oral TID PRN Jaki Miranda MD   500 mg at 04/23/22 1037    furosemide (LASIX) tablet 80 mg  80 mg Oral Daily Dallas Arnold MD   80 mg at 04/25/22 3311    polyethylene glycol (GLYCOLAX) packet 17 g  17 g Oral Daily PRN Jules King MD   17 g at 04/23/22 1038    aspirin EC tablet 81 mg  81 mg Oral Daily Lety Saab MD   81 mg at 04/26/22 1651    sodium chloride flush 0.9 % injection 5-40 mL  5-40 mL IntraVENous 2 times per day Dewimoodye Goodell, DO   10 mL at 04/26/22 2113    sodium chloride flush 0.9 % injection 5-40 mL  5-40 mL IntraVENous PRN Magdalena Goodell, DO        0.9 % sodium chloride infusion   IntraVENous PRN Magdalena Goodell, DO 25 mL/hr at 04/20/22 1212 25 mL at 04/20/22 1212    ondansetron (ZOFRAN-ODT) disintegrating tablet 4 mg  4 mg Oral Q8H PRN Dewitte Goodell,         Or    ondansetron [facility]) injection 4 mg  4 mg IntraVENous Q6H PRN Dewitte Goodell, DO Joceline Walker ranolazine (RANEXA) extended release tablet 1,000 mg  1,000 mg Oral BID Saúl Anderson MD   1,000 mg at 04/26/22 2146    busPIRone (BUSPAR) tablet 7.5 mg  7.5 mg Oral TID Saúl Anderson MD   7.5 mg at 04/26/22 2040    pantoprazole (PROTONIX) tablet 40 mg  40 mg Oral QAM AC Saúl Anderson MD   40 mg at 04/26/22 5038    atorvastatin (LIPITOR) tablet 80 mg  80 mg Oral Daily Saúl Anderson MD   80 mg at 04/26/22 1654    febuxostat (ULORIC) tablet 40 mg  40 mg Oral Daily Saúl Anderson MD   40 mg at 04/26/22 1655    docusate sodium (COLACE) capsule 100 mg  100 mg Oral BID Saúl Anderson MD   100 mg at 04/26/22 2040    [Held by provider] tamsulosin (FLOMAX) capsule 0.4 mg  0.4 mg Oral Daily Saúl Anderson MD   0.4 mg at 04/18/22 0815    [Held by provider] insulin lispro (HUMALOG) injection vial 15 Units  15 Units SubCUTAneous TID AC Saúl Anderson MD   15 Units at 04/16/22 1713    sodium bicarbonate tablet 650 mg  650 mg Oral TID Saúl Anderson MD   650 mg at 04/26/22 2041    [Held by provider] gabapentin (NEURONTIN) capsule 300 mg  300 mg Oral BID Saúl Anderson MD   300 mg at 04/25/22 2137    melatonin tablet 9 mg  9 mg Oral Nightly Saúl Anderson MD   9 mg at 04/26/22 2040    lidocaine-prilocaine (EMLA) cream   Topical PRN Saúl Anderson MD        carvedilol (COREG) tablet 3.125 mg  3.125 mg Oral BID Saúl Anderson MD   3.125 mg at 04/26/22 2041    sodium phosphate 10 mmol in dextrose 5 % 250 mL IVPB  10 mmol IntraVENous Once Zuleyka Lucia MD        anticoagulant sodium citrate 4 % injection 1.6 mL  1.6 mL IntraCATHeter PRN Zuleyka Lucia MD   1.6 mL at 04/26/22 1414    anticoagulant sodium citrate 4 % injection 1.6 mL  1.6 mL IntraCATHeter PRN Zuleyka Lucia MD   1.6 mL at 04/26/22 1414    glucose (GLUTOSE) 40 % oral gel 15 g  15 g Oral PRN Saúl Anderson MD        dextrose 50 % IV solution  12.5 g IntraVENous PRN MD Charla Zheng glucagon (rDNA) injection 1 mg  1 mg IntraMUSCular PRN Dylan Ortiz MD        miconazole (MICOTIN) 2 % powder   Topical BID Dylan Ortiz MD   Given at 04/26/22 2113    acetaminophen (TYLENOL) tablet 650 mg  650 mg Oral Q4H PRN Lauren Vincent DO   650 mg at 04/26/22 2041       Intake/Output Summary (Last 24 hours) at 4/27/2022 0608  Last data filed at 4/26/2022 1830  Gross per 24 hour   Intake 500 ml   Output 2000 ml   Net -1500 ml     Recent Results (from the past 24 hour(s))   POC Glucose Fingerstick    Collection Time: 04/26/22  6:33 AM   Result Value Ref Range    POC Glucose 114 (H) 65 - 105 mg/dL   POC Glucose Fingerstick    Collection Time: 04/26/22 11:27 AM   Result Value Ref Range    POC Glucose 157 (H) 65 - 105 mg/dL   POC Glucose Fingerstick    Collection Time: 04/26/22  4:10 PM   Result Value Ref Range    POC Glucose 186 (H) 65 - 105 mg/dL   POC Glucose Fingerstick    Collection Time: 04/26/22  8:49 PM   Result Value Ref Range    POC Glucose 246 (H) 65 - 105 mg/dL   Lactate, Sepsis    Collection Time: 04/26/22  9:57 PM   Result Value Ref Range    Lactic Acid, Sepsis 1.0 0.5 - 1.9 mmol/L   Procalcitonin    Collection Time: 04/26/22  9:57 PM   Result Value Ref Range    Procalcitonin 1.16 (H) <0.09 ng/mL   CBC with Auto Differential    Collection Time: 04/27/22  5:53 AM   Result Value Ref Range    WBC 3.5 3.5 - 11.0 k/uL    RBC 2.77 (L) 4.0 - 5.2 m/uL    Hemoglobin 8.6 (L) 12.0 - 16.0 g/dL    Hematocrit 25.5 (L) 36 - 46 %    MCV 91.8 80 - 100 fL    MCH 31.2 26 - 34 pg    MCHC 33.9 31 - 37 g/dL    RDW 15.9 (H) 11.5 - 14.9 %    Platelets 582 (L) 677 - 450 k/uL    MPV 8.1 6.0 - 12.0 fL    Seg Neutrophils 74 (H) 36 - 66 %    Lymphocytes 14 (L) 24 - 44 %    Monocytes 10 (H) 1 - 7 %    Eosinophils % 1 0 - 4 %    Basophils 1 0 - 2 %    Segs Absolute 2.60 1.3 - 9.1 k/uL    Absolute Lymph # 0.50 (L) 1.0 - 4.8 k/uL    Absolute Mono # 0.40 0.1 - 1.3 k/uL    Absolute Eos # 0.00 0.0 - 0.4 k/uL    Basophils Absolute 0.00 0.0 - 0.2 k/uL     -----------------------------------------------------------------  RAD:  EKG:  Micro:     Assessment & Plan:    Patient Active Problem List:     Atherosclerosis of coronary artery bypass graft of native heart without angina pectoris     Chronic diastolic heart failure (HCC)     Diabetic polyneuropathy associated with type 2 diabetes mellitus ([facility]     History of coronary artery bypass graft     Iron deficiency anemia     Spinal stenosis of lumbar region with neurogenic claudication     Mixed hyperlipidemia     Type 2 diabetes mellitus with kidney complication, with long-term current use of insulin (HCC)     Thyroid nodule greater than or equal to 1 cm in diameter incidentally noted on imaging study     Essential hypertension     Chronic ischemic heart disease     Ischemic stroke of frontal lobe (HCC)     Morbid obesity with BMI of 40.0-44.9, adult ([facility])     Disequilibrium syndrome     Generalized weakness     Long-term memory loss     Muscle right arm weakness     Anxiety     Chronic midline low back pain with bilateral sciatica     Tremor     COVID     End-stage renal disease ([facility]     DKA, type 2, not at goal [facility])     Hypokalemia           Plan:  -DKA - resolved, hypoglycemic episodes continue, regular Lantus dose decreased to 50 units BID, SS coverage and carb control diet continued.  -Chronic hypoxic respiratory failure - on continuous oxygen per NC, O2 needs have increased, Pulmonology managing. -ESRD on dialysis - Nephrology managing.  -Acute on chronic CHF exacerbation - volume status being managed by Nephrology. -UTI - completed Rocephin, new U/A with cultures & sensitivities ordered. -TIA like symptoms - MRI & MRA negative, EEG ordered, Neurology following. -RLL infiltrate - re-demonstrated on repeat CXR, IV Zithromax & Rocephin started. -Generalized weakness - PT/OT treating. -D/C planning ongoing - PM&R evaluating for possible transfer to ARU .   -Complete orders per chart.    See orders   Disposition:    Electronically signed by Danette Foss MD on 4/27/2022 at 6:08 AM

## 2022-04-28 VITALS
HEIGHT: 65 IN | RESPIRATION RATE: 16 BRPM | WEIGHT: 261.25 LBS | SYSTOLIC BLOOD PRESSURE: 113 MMHG | DIASTOLIC BLOOD PRESSURE: 53 MMHG | TEMPERATURE: 98.4 F | OXYGEN SATURATION: 93 % | HEART RATE: 66 BPM | BODY MASS INDEX: 43.53 KG/M2

## 2022-04-28 LAB
ABSOLUTE EOS #: 0.1 K/UL (ref 0–0.4)
ABSOLUTE LYMPH #: 0.6 K/UL (ref 1–4.8)
ABSOLUTE MONO #: 0.4 K/UL (ref 0.1–1.3)
ANION GAP SERPL CALCULATED.3IONS-SCNC: 12 MMOL/L (ref 9–17)
BASOPHILS # BLD: 1 % (ref 0–2)
BASOPHILS ABSOLUTE: 0 K/UL (ref 0–0.2)
BUN BLDV-MCNC: 35 MG/DL (ref 8–23)
CALCIUM SERPL-MCNC: 8.8 MG/DL (ref 8.6–10.4)
CHLORIDE BLD-SCNC: 94 MMOL/L (ref 98–107)
CO2: 27 MMOL/L (ref 20–31)
CREAT SERPL-MCNC: 4.07 MG/DL (ref 0.5–0.9)
CULTURE: NO GROWTH
EOSINOPHILS RELATIVE PERCENT: 2 % (ref 0–4)
GFR AFRICAN AMERICAN: 13 ML/MIN
GFR NON-AFRICAN AMERICAN: 11 ML/MIN
GFR SERPL CREATININE-BSD FRML MDRD: ABNORMAL ML/MIN/{1.73_M2}
GLUCOSE BLD-MCNC: 117 MG/DL (ref 70–99)
GLUCOSE BLD-MCNC: 119 MG/DL (ref 65–105)
GLUCOSE BLD-MCNC: 88 MG/DL (ref 65–105)
HCT VFR BLD CALC: 25.9 % (ref 36–46)
HEMOGLOBIN: 8.7 G/DL (ref 12–16)
LYMPHOCYTES # BLD: 14 % (ref 24–44)
MCH RBC QN AUTO: 30.9 PG (ref 26–34)
MCHC RBC AUTO-ENTMCNC: 33.6 G/DL (ref 31–37)
MCV RBC AUTO: 92 FL (ref 80–100)
MONOCYTES # BLD: 10 % (ref 1–7)
PDW BLD-RTO: 15.7 % (ref 11.5–14.9)
PLATELET # BLD: 143 K/UL (ref 150–450)
PMV BLD AUTO: 8.2 FL (ref 6–12)
POTASSIUM SERPL-SCNC: 3.7 MMOL/L (ref 3.7–5.3)
RBC # BLD: 2.82 M/UL (ref 4–5.2)
SEG NEUTROPHILS: 73 % (ref 36–66)
SEGMENTED NEUTROPHILS ABSOLUTE COUNT: 3.2 K/UL (ref 1.3–9.1)
SODIUM BLD-SCNC: 133 MMOL/L (ref 135–144)
SPECIMEN DESCRIPTION: NORMAL
WBC # BLD: 4.4 K/UL (ref 3.5–11)

## 2022-04-28 PROCEDURE — 97530 THERAPEUTIC ACTIVITIES: CPT

## 2022-04-28 PROCEDURE — 6360000002 HC RX W HCPCS: Performed by: INTERNAL MEDICINE

## 2022-04-28 PROCEDURE — 6370000000 HC RX 637 (ALT 250 FOR IP): Performed by: INTERNAL MEDICINE

## 2022-04-28 PROCEDURE — 80048 BASIC METABOLIC PNL TOTAL CA: CPT

## 2022-04-28 PROCEDURE — 99239 HOSP IP/OBS DSCHRG MGMT >30: CPT | Performed by: FAMILY MEDICINE

## 2022-04-28 PROCEDURE — 82947 ASSAY GLUCOSE BLOOD QUANT: CPT

## 2022-04-28 PROCEDURE — 6370000000 HC RX 637 (ALT 250 FOR IP): Performed by: FAMILY MEDICINE

## 2022-04-28 PROCEDURE — 6370000000 HC RX 637 (ALT 250 FOR IP): Performed by: PSYCHIATRY & NEUROLOGY

## 2022-04-28 PROCEDURE — 90935 HEMODIALYSIS ONE EVALUATION: CPT

## 2022-04-28 PROCEDURE — 2580000003 HC RX 258: Performed by: NURSE PRACTITIONER

## 2022-04-28 PROCEDURE — 2580000003 HC RX 258: Performed by: STUDENT IN AN ORGANIZED HEALTH CARE EDUCATION/TRAINING PROGRAM

## 2022-04-28 PROCEDURE — 85025 COMPLETE CBC W/AUTO DIFF WBC: CPT

## 2022-04-28 PROCEDURE — 6360000002 HC RX W HCPCS: Performed by: NURSE PRACTITIONER

## 2022-04-28 PROCEDURE — 2500000003 HC RX 250 WO HCPCS: Performed by: INTERNAL MEDICINE

## 2022-04-28 PROCEDURE — 97535 SELF CARE MNGMENT TRAINING: CPT

## 2022-04-28 PROCEDURE — 36415 COLL VENOUS BLD VENIPUNCTURE: CPT

## 2022-04-28 PROCEDURE — P9047 ALBUMIN (HUMAN), 25%, 50ML: HCPCS | Performed by: INTERNAL MEDICINE

## 2022-04-28 RX ORDER — ALBUMIN (HUMAN) 12.5 G/50ML
25 SOLUTION INTRAVENOUS ONCE
Status: COMPLETED | OUTPATIENT
Start: 2022-04-28 | End: 2022-04-28

## 2022-04-28 RX ORDER — AMOXICILLIN AND CLAVULANATE POTASSIUM 500; 125 MG/1; MG/1
1 TABLET, FILM COATED ORAL DAILY
Qty: 5 TABLET | Refills: 0 | Status: SHIPPED | OUTPATIENT
Start: 2022-04-28 | End: 2022-05-03

## 2022-04-28 RX ADMIN — RANOLAZINE 1000 MG: 1000 TABLET, FILM COATED, EXTENDED RELEASE ORAL at 13:08

## 2022-04-28 RX ADMIN — PANTOPRAZOLE SODIUM 40 MG: 40 TABLET, DELAYED RELEASE ORAL at 05:21

## 2022-04-28 RX ADMIN — CEFEPIME 1000 MG: 1 INJECTION, POWDER, FOR SOLUTION INTRAMUSCULAR; INTRAVENOUS at 16:37

## 2022-04-28 RX ADMIN — SODIUM CHLORIDE, PRESERVATIVE FREE 10 ML: 5 INJECTION INTRAVENOUS at 13:05

## 2022-04-28 RX ADMIN — ANTI-FUNGAL POWDER MICONAZOLE NITRATE TALC FREE: 1.42 POWDER TOPICAL at 13:05

## 2022-04-28 RX ADMIN — ATORVASTATIN CALCIUM 80 MG: 80 TABLET, FILM COATED ORAL at 13:03

## 2022-04-28 RX ADMIN — Medication 1.6 ML: at 12:20

## 2022-04-28 RX ADMIN — ASPIRIN 81 MG: 81 TABLET, COATED ORAL at 13:03

## 2022-04-28 RX ADMIN — INSULIN GLARGINE 50 UNITS: 100 INJECTION, SOLUTION SUBCUTANEOUS at 09:24

## 2022-04-28 RX ADMIN — CARVEDILOL 3.12 MG: 3.12 TABLET, FILM COATED ORAL at 13:03

## 2022-04-28 RX ADMIN — SODIUM BICARBONATE 650 MG: 650 TABLET ORAL at 13:03

## 2022-04-28 RX ADMIN — ALBUMIN (HUMAN) 25 G: 0.25 INJECTION, SOLUTION INTRAVENOUS at 12:02

## 2022-04-28 RX ADMIN — FEBUXOSTAT 40 MG: 40 TABLET, FILM COATED ORAL at 13:08

## 2022-04-28 RX ADMIN — APIXABAN 5 MG: 5 TABLET, FILM COATED ORAL at 13:03

## 2022-04-28 RX ADMIN — DOCUSATE SODIUM 100 MG: 100 CAPSULE, LIQUID FILLED ORAL at 13:03

## 2022-04-28 RX ADMIN — BUSPIRONE HYDROCHLORIDE 7.5 MG: 5 TABLET ORAL at 13:03

## 2022-04-28 RX ADMIN — FUROSEMIDE 80 MG: 40 TABLET ORAL at 13:02

## 2022-04-28 NOTE — PROGRESS NOTES
Jason Bhatt MD/Carter Garcia MD/ Barak Alvarez MD/Dr Chandrika Barahona APRN AGAJOSE LUISP-BC, NP-C      Brunilda Urbano APRN NP-C     Maik Shell APRN NP-C                                           Pulmonary Progress Note    Patient - Celestino Peterson   Age - 57 y.o.   - 1960  MRN - [id number]  Acct # - [de-identified]  Date of Admission - 2022  3:17 PM    Consulting Service/Physician:       Primary Care Physician: AFSANEH Auguste - CNP    SUBJECTIVE:     Chief Complaint:   Chief Complaint   Patient presents with    Shortness of Breath     Subjective:    Estefani Crane states she is doing okay today. She reports some mild confusion at 3 AM.  She is currently alert and appropriate. She is seen in hemodialysis. Per hemodialysis nurse goal is to remove 2 L today. She was started on IV cefepime yesterday. T-max for 24 hours was 99.1. She is currently on 3 L nasal cannula pulse ox 95%. VITALS  BP (!) 139/59   Pulse 64   Temp 98.3 °F (36.8 °C) (Oral)   Resp 20   Ht 5' 5\" (1.651 m)   Wt 261 lb 3.9 oz (118.5 kg)   SpO2 95%   BMI 43.47 kg/m²   Wt Readings from Last 3 Encounters:   22 261 lb 3.9 oz (118.5 kg)   22 248 lb (112.5 kg)   22 250 lb (113.4 kg)     I/O (24 Hours)    Intake/Output Summary (Last 24 hours) at 2022 9484  Last data filed at 2022 3059  Gross per 24 hour   Intake 810 ml   Output --   Net 810 ml     Ventilator:      Exam:   Physical Exam   Constitutional: Obese female lying in bed on hemodialysis in no acute distress  HENT: Unremarkable  Head: Normocephalic and atraumatic. Eyes: EOM are normal. Pupils are equal, round, and reactive to light. Neck: Neck supple. Cardiovascular:  Regular rate and rhythm. Normal heart tones. No JVD. Pulmonary/Chest: Respirations even and unlabored, lungs diminished at the bases, on 3 L nasal cannula pulse ox 95%. Abdominal: Soft.  Bowel sounds are normal.  Obese abdomen  Musculoskeletal: Normal range of motion. Neurological: Patient is alert and oriented to person, place, and time. She is able to answer questions today and is appropriate. Skin: Skin is warm and dry. No rash noted.    Extremities: Trace edema to arms and legs  Infusions:      sodium chloride 25 mL (04/20/22 1212)     Meds:     Current Facility-Administered Medications:     cefepime (MAXIPIME) 1000 mg IVPB minibag, 1,000 mg, IntraVENous, Daily, AFSANEH Garcia - CNP    traZODone (DESYREL) tablet 75 mg, 75 mg, Oral, Nightly, El Gray DO, 75 mg at 04/27/22 2128    apixaban (ELIQUIS) tablet 5 mg, 5 mg, Oral, BID, Yasmeen Boone MD, 5 mg at 04/27/22 2128    insulin glargine (LANTUS) injection vial 50 Units, 50 Units, SubCUTAneous, BID, Valdemar Haider MD, 50 Units at 04/27/22 2127    insulin lispro (HUMALOG) injection vial 0-18 Units, 0-18 Units, SubCUTAneous, TID WC, Valdemar Haider MD, 3 Units at 04/27/22 1736    insulin lispro (HUMALOG) injection vial 0-9 Units, 0-9 Units, SubCUTAneous, Nightly, Valdemar Haider MD, 3 Units at 04/27/22 2126    calcium carbonate (TUMS) chewable tablet 500 mg, 500 mg, Oral, TID PRN, Valdemar Haider MD, 500 mg at 04/23/22 1037    furosemide (LASIX) tablet 80 mg, 80 mg, Oral, Daily, Danielle Silverman MD, 80 mg at 04/27/22 9495    polyethylene glycol (GLYCOLAX) packet 17 g, 17 g, Oral, Daily PRN, Yasmeen Boone MD, 17 g at 04/23/22 1038    aspirin EC tablet 81 mg, 81 mg, Oral, Daily, Tara Romero MD, 81 mg at 04/27/22 0836    sodium chloride flush 0.9 % injection 5-40 mL, 5-40 mL, IntraVENous, 2 times per day, Prosper Lloyd DO, 10 mL at 04/27/22 2134    sodium chloride flush 0.9 % injection 5-40 mL, 5-40 mL, IntraVENous, PRN, Prosper Lloyd,     0.9 % sodium chloride infusion, , IntraVENous, PRN, Prosper Lloyd DO, Last Rate: 25 mL/hr at 04/20/22 1212, 25 mL at 04/20/22 1212    ondansetron (ZOFRAN-ODT) disintegrating tablet 4 mg, 4 mg, Oral, Q8H PRN **OR** ondansetron (ZOFRAN) injection 4 mg, 4 mg, IntraVENous, Q6H PRN, Andrew Janes, DO    ranolazine [facility]) extended release tablet 1,000 mg, 1,000 mg, Oral, BID, Ashley Camarena MD, 1,000 mg at 04/27/22 2137    busPIRone (BUSPAR) tablet 7.5 mg, 7.5 mg, Oral, TID, Ashley Camarena MD, 7.5 mg at 04/27/22 2128    pantoprazole (PROTONIX) tablet 40 mg, 40 mg, Oral, QAM AC, Ashley Camarena MD, 40 mg at 04/28/22 0521    atorvastatin (LIPITOR) tablet 80 mg, 80 mg, Oral, Daily, Ashley Camarena MD, 80 mg at 04/27/22 0836    febuxostat (ULORIC) tablet 40 mg, 40 mg, Oral, Daily, Ashley Camarena MD, 40 mg at 04/27/22 1357    docusate sodium (COLACE) capsule 100 mg, 100 mg, Oral, BID, Ashley Camarena MD, 100 mg at 04/27/22 2128    [Held by provider] tamsulosin (FLOMAX) capsule 0.4 mg, 0.4 mg, Oral, Daily, Ashley Camarena MD, 0.4 mg at 04/18/22 0815    [Held by provider] insulin lispro (HUMALOG) injection vial 15 Units, 15 Units, SubCUTAneous, TID AC, Ashley Camarena MD, 15 Units at 04/16/22 1713    sodium bicarbonate tablet 650 mg, 650 mg, Oral, TID, Ashley Camarena MD, 650 mg at 04/27/22 2129    [Held by provider] gabapentin (NEURONTIN) capsule 300 mg, 300 mg, Oral, BID, Ashley Camarena MD, 300 mg at 04/25/22 2137    melatonin tablet 9 mg, 9 mg, Oral, Nightly, Ashley Camarena MD, 9 mg at 04/27/22 2128    lidocaine-prilocaine (EMLA) cream, , Topical, PRN, Ashley Camarena MD    carvedilol (COREG) tablet 3.125 mg, 3.125 mg, Oral, BID, Ashley Camarena MD, 3.125 mg at 04/27/22 5293    sodium phosphate 10 mmol in dextrose 5 % 250 mL IVPB, 10 mmol, IntraVENous, Once, Earl Medel MD    anticoagulant sodium citrate 4 % injection 1.6 mL, 1.6 mL, IntraCATHeter, PRN, Earl Medel MD, 1.6 mL at 04/26/22 1414    anticoagulant sodium citrate 4 % injection 1.6 mL, 1.6 mL, IntraCATHeter, PRN, Earl Medel MD, 1.6 mL at 04/26/22 1414    glucose (GLUTOSE) 40 % oral gel 15 g, 15 g, Oral, PRN, Ariane Block MD    dextrose 50 % IV solution, 12.5 g, IntraVENous, PRN, Ariane Block MD    glucagon (rDNA) injection 1 mg, 1 mg, IntraMUSCular, PRN, Ariane Block MD    miconazole (MICOTIN) 2 % powder, , Topical, BID, Ariane Block MD, Given at 04/27/22 2136    acetaminophen (TYLENOL) tablet 650 mg, 650 mg, Oral, Q4H PRN, Carlito Chandra DO, 650 mg at 04/26/22 2041    Lab Results:     Lab Results   Component Value Date    WBC 4.4 04/28/2022    HGB 8.7 (L) 04/28/2022    HCT 25.9 (L) 04/28/2022    MCV 92.0 04/28/2022     (L) 04/28/2022     Lab Results   Component Value Date    CALCIUM 8.8 04/28/2022     (L) 04/28/2022    K 3.7 04/28/2022    CO2 27 04/28/2022    CL 94 (L) 04/28/2022    BUN 35 (H) 04/28/2022    CREATININE 4.07 (H) 04/28/2022       Lab Results   Component Value Date    INR 0.9 11/10/2021    PROTIME 12.7 11/10/2021       Radiology:       ASSESSMENT:     1. RLL pneumonia/atelectasis versus fluid volume excess  2. DKA - resolved  3. Altered mental status, improved  4. HTN urgency  5. UTI-completed ceftriaxone, culture with no signficant growth  6. ESRD on hemodialysis, TTS  7. Acute on Chronic diastolic heart failure-Grade 1  8. Syncopal episode   9. Chronic hypoxic respiratory insufficiency-on 2 L chronically  10. ERICK (clinical diagnosis)  11. Obesity  12. Full code    PLAN:   1. Wean oxygen to keep pulse ox above 91%  2. Hemodialysis per nephrology, may benefit from extra fluid removal  3. Would benefit from outpatient sleep study  4. Continue IV cefepime  5. Chest x-ray in a.m.  6. Unable to follow-up with our office outpatient secondary to insurance, would benefit from outpatient follow-up with [facility] pulmonology after discharge      Electronically signed by AFSANEH Nichols - CNP on 04/28/22     This progress note was completed using a voice transcription system. Every effort was made to ensure accuracy. However, inadvertent computerized transcription errors may be present.     Erik Kay, NP-C, MSN  [facility] Pulmonary, Critical Care & Sleep

## 2022-04-28 NOTE — PROGRESS NOTES
CLINICAL PHARMACY NOTE: MEDS TO BEDS    Total # of Prescriptions Filled: 1   The following medications were delivered to the patient:  · Amoxicillin Pot Clav 500-125    Additional Documentation:  Delivered medications to patients room

## 2022-04-28 NOTE — PROGRESS NOTES
Dialysis Post Treatment Report:     -Access Assessment  wnl good BFR     -Ultrafiltration   UF Goal 2900   Prime (-) 400   NS Flush (-)    Other (-/+)    Total UF Removed 2500     -Medications / Blood Administration  Medications Given (Y/N) albumin 25g   Blood Products (Y/N) n     Narrative:  Pt tolerated hd well. No issues noted.  Post wt 116 kilo

## 2022-04-28 NOTE — PROGRESS NOTES
Physical Therapy  [address] [facility]   Physical Therapy Progress Note    Date: 22  Patient Name: John Parr       Room:   MRN: [id number]   Account: [de-identified]   : 1960  (60 y.o.) Gender: female        Diagnosis: DKA  Past Medical History:  has a past medical history of Backache, unspecified, CHF (congestive heart failure) ([facility] [address] Chronic kidney disease, Coronary atherosclerosis of artery bypass graft, COVID, Cramp of limb, Gallstones, Hypertension, Insomnia, Pneumonia, Type II or unspecified type diabetes mellitus with renal manifestations, not stated as uncontrolled(250.40), Type II or unspecified type diabetes mellitus without mention of complication, not stated as uncontrolled, and Unspecified vitamin D deficiency. Past Surgical History:   has a past surgical history that includes Coronary artery bypass graft; Knee arthroscopy; Carpal tunnel release; Breast surgery; Tonsillectomy; Hand surgery; Ankle fracture surgery; Cholecystectomy, open (N/A); IR TUNNELED CVC PLACE WO SQ PORT/PUMP > 5 YEARS (2021); AV fistula creation (2021); Dialysis fistula creation (Left, 2021); and other surgical history (2022). Additional Pertinent Hx: The patient is a 57 y.o. female who presents to [address] [place] [address] with complaints of shortness of breath. She has a known hx of CHF, DM2 and ESRD on hemodialysis. She states that the SOB has been progressively worsening over the past few days. SOB was not relieved by Dialysis treatment. Overall Orientation Status: Within Functional Limits  Restrictions/Precautions  Restrictions/Precautions: General Precautions; Fall Risk;Up as Tolerated  Required Braces or Orthoses?: Yes    Subjective: Patient sitting up in bed upon arrival, agreeable to therapy   Comments: DEDE Reyes approved therapy; co-treat with Ann Marie Nieto        Pain Assessment: None - Denies Pain    Bed Mobility  Rolling: Stand by assistance  Supine to Sit: Minimal assistance  Sit to Supine: Stand by assistance  Scooting: Stand by assistance  Bed mobility  Scooting: Stand by assistance    Transfers:  Sit to Stand: Stand by assistance  Stand to sit: Stand by assistance  Bed to Chair: Contact guard assistance                       Stairs/Curb  Stairs?: No    EXERCISES    Other exercises?: Yes  Other exercises 1: bed mobility x4; upon sitting up the first time, patient stated she needed to have a BM and laid back down to get on a bedpan. Writer able to have patient use bedside commode instead of bedpan   Other exercises 2: STS x6; multiple performed d/t pericare and brief change after BM and having poor standing tolernace   Other exercises 3: purewick was taken out for patient to use commode and placed back once patient was back in bed   Other exercises 4: standing tolernace >45 seconds each stand            Activity Tolerance: Patient tolerated treatment well,Patient limited by endurance,Treatment limited secondary to decreased cognition  PT Equipment Recommendations  Equipment Needed: No       Current Treatment Recommendations: Strengthening,Balance training,Functional mobility training,Transfer training,Endurance training,Gait training    Conditions Requiring Skilled Therapeutic Intervention  Body Structures, Functions, Activity Limitations Requiring Skilled Therapeutic Intervention: Decreased functional mobility ; Decreased strength;Decreased endurance;Decreased balance;Decreased posture  Treatment Diagnosis: Impaired functional mobility and endurance 2* fatigue and nausea associated with Diabetic Ketoacidosis  History: H/O CVA- was in ARU in Nov,Dec, of 2021 and continued OP Therapy; CHF, Type 2 DM, Hemodialysis on T/TH  Discharge Recommendations: Patient would benefit from continued therapy after discharge;Subacute/Skilled Nursing Facility    Goals  Short Term Goals  Time Frame for Short term goals: 5 days  Short term goal 1: pt to demo independent bed mobility Short term goal 2: pt to perform all Transfers with Rollator (or RW), Mod I  Short term goal 3: pt to ambulate household distances of 54-65' with Rollator (or RW) to improve independence and safety with mobility.    Short term goal 4: pt to improve standing balance to good with Use of AD  Short term goal 5: pt to improve standing tolerance to 10 minutes to improve tolerance for daily activities and ADLs       04/28/22 1535   PT Individual Minutes   Time In 1343   Time Out 1423   Minutes 40       Electronically signed by Princess Wilkins PTA on 4/28/22 at 3:35 PM EDT

## 2022-04-28 NOTE — PROGRESS NOTES
Dialysis Safety Checks:    Patient ID Verified (Y/N) y     -Hepatitis Test                   Date      Result  Hepatitis B Surface Antigen   3/30/22 neg     Hepatitis B Surface Antibody 3/30/22 pos     Hepatitis B Core Antibody        -Treatment Initiation  Blood Vol Processed Goal (Liters)  Pump Speed x Treatment Hours x 60 Minutes    Target Fluid Removal 1.5-2 kilo     * Intra-treatment documented Safety Checks include the followin) Access and face visible at all times. 2) All connections and blood lines are secure with no kinks. 3) NVL alarm engaged. 4) Hemosafe device applied (if applicable). 5) No collapse of Arterial or Venous blood chambers. 6) All blood lines / pump segments in the air detectors.   --------------------------------------------------------------------------------

## 2022-04-28 NOTE — PLAN OF CARE
Problem: Falls - Risk of:  Goal: Will remain free from falls  Description: Will remain free from falls  4/28/2022 0332 by Eleuterio Carrillo RN  Outcome: Progressing     No falls noted this shift. Bed kept in low position. Safe environment maintained. Bedside table & call light in reach. Uses call light appropriately when needing assistance. Problem: Skin Integrity:  Goal: Absence of new skin breakdown  Description: Absence of new skin breakdown  4/28/2022 0332 by Eleuterio Carrillo RN  Outcome: Progressing     Abdomen and under breast reddened. Medicated Powder applied and area dry. Problem: Musculor/Skeletal Functional Status  Goal: Highest potential functional level  4/28/2022 0332 by Eleuterio Carrillo RN  Outcome: Progressing     Problem: Pain:  Goal: Control of acute pain  Description: Control of acute pain  4/28/2022 0332 by Eleuterio Carrillo RN  Outcome: Progressing     No pain reported this shift.     Problem: Discharge Planning  Goal: Discharge to home or other facility with appropriate resources  4/28/2022 0332 by Eleuterio Carrillo RN  Outcome: Progressing

## 2022-04-28 NOTE — PROGRESS NOTES
Occupational Therapy  Facility/Department: [facility] PROGRESSIVE CARE  Daily Treatment Note  NAME: Ludwig Ovalle  : 1960  MRN: [id number]    Date of Service: 2022    Discharge Recommendations:  Patient would benefit from continued therapy after discharge         Patient Diagnosis(es): The primary encounter diagnosis was Diabetic ketoacidosis without coma associated with type 2 diabetes mellitus ([facility] [address] A diagnosis of Dyspnea, unspecified type was also pertinent to this visit. Assessment    Activity Tolerance: Patient tolerated treatment well;Patient limited by endurance;Treatment limited secondary to decreased cognition  Discharge Recommendations: Patient would benefit from continued therapy after discharge      Plan   PT Plan of Care: Daily  Plan  Times per Week: 4-6  Current Treatment Recommendations: Self-Care / ADL; Strengthening;ROM;Balance training;Functional mobility training; Endurance training;Pain management; Safety education & training;Patient/Caregiver education & training;Equipment evaluation, education, & procurement;Home management training     Subjective   Subjective  Subjective: \"I need to use the bathroom. \"  Pain: No c/o  Cognition  Overall Cognitive Status: WFL  Patient affect[de-identified] Normal        Objective    Vitals     Bed Mobility Training  Bed Mobility Training: Yes  Overall Level of Assistance: Contact-guard assistance  Rolling: Contact-guard assistance;Stand-by assistance  Supine to Sit: Supervision  Sit to Supine: Contact-guard assistance  Scooting: Stand-by assistance  Balance  Sitting: Without support  Standing: With support  Transfer Training  Transfer Training: Yes  Overall Level of Assistance: Contact-guard assistance  Interventions: Safety awareness training;Verbal cues; Visual cues  Sit to Stand: Contact-guard assistance  Stand to Sit: Stand-by assistance  Toilet Transfer: Contact-guard assistance (Bed side commode.  x3 transfers.)  Gait  Overall Level of Assistance: Stand-by assistance  Interventions: Safety awareness training  Base of Support: Widened  Speed/Annie: Slow  Assistive Device: Walker     ADL  Feeding: Setup  Grooming: Setup  UE Bathing: Supervision  LE Bathing: Minimal assistance  UE Dressing: Supervision  LE Dressing: Moderate assistance  Toileting: Minimal assistance (Assistance required for thorough hygiene.)  Additional Comments: ADL scores based on clinical reasoning and skilled observation unless otherwise noted. Pt currently limited due to decreased strength, balance, and activity tolerance impacting safety and independence with self care tasks. Patient Education  Education Given To: Patient; Family  Education Provided: Role of Therapy;Plan of Care;Transfer Training; Fall Prevention Strategies  Education Method: Demonstration;Verbal  Barriers to Learning: None  Education Outcome: Verbalized understanding;Continued education needed    Goals  Short Term Goals  Time Frame for Short term goals: By discharge  Short Term Goal 1: Pt will complete lower body dressing/bathing/toileting with Supervision and Good safety   Short Term Goal 2: Pt will complete functional transfers/mobility during self care tasks wtih Supervision and Good safety  Short Term Goal 3: Pt will tolerate standing 5+ mintues during functional activity of choice with Supervision and Good safety while maintaining SpO2 above 90%  Short Term Goal 4: Pt will verbalize/demonstrate good understanding of home safety/fall prevention strategies to increase safety and independence with self care and mobility  Short Term Goal 5: Pt will participate in 15+ minutes of therapeutic exercises/functional activities to increase safety and independence with self care and mobility       Therapy Time   Individual Concurrent Group Co-treatment   Time In 1343         Time Out 1423         Minutes Tai Goldman 855, GARICA/L

## 2022-04-28 NOTE — CARE COORDINATION
Pt received authorization to [facility] at [facility] on this date. Pt is medically ready for discharge. SW scheduled transport with [facility] for between 5/5:30PM to [facility] at [address] was completed.     Number for report: [phone]

## 2022-04-28 NOTE — PROGRESS NOTES
Physical Therapy  DATE: 2022    NAME: Peterson Marte  MRN: [id number]   : 1960    Patient not seen this date for Physical Therapy due to:      [] Cancel by RN or physician due to:    [x] Hemodialysis; checked on patient @ [phone]. Patient in dialysis. Will continue to follow. [] Critical Lab Value Level     [] Blood transfusion in progress    [] Acute or unstable cardiovascular status   _MAP < 55 or more than >115  _HR < 40 or > 130    [] Acute or unstable pulmonary status   -FiO2 > 60%   _RR < 5 or >40    _O2 sats < 85%    [] Strict Bedrest    [] Off Unit for surgery or procedure    [] Off Unit for testing       [] Pending imaging to R/O fracture    [] Refusal by Patient      [] Other      [] PT being discontinued at this time. Patient independent. No further needs. [] PT being discontinued at this time as the patient has been transferred to hospice care. No further needs.       Electronically signed by Naveen Thomas PTA on 22 at 11:50 AM EDT

## 2022-04-29 ENCOUNTER — CARE COORDINATION (OUTPATIENT)
Dept: CARE COORDINATION | Age: 62
End: 2022-04-29

## 2022-04-29 LAB — GLUCOSE BLD-MCNC: 77 MG/DL (ref 65–105)

## 2022-04-29 NOTE — ADT AUTH CERT
Utilization Reviews         Diabetes - Care Day 15 (4/27/2022) by Kierra Mendoza RN       Review Status Review Entered   Completed 4/28/2022 15:37      Criteria Review      Care Day: 15 Care Date: 4/27/2022 Level of Care: Intermediate Care    Guideline Day 3    Clinical Status    (X) * Hemodynamic stability    4/28/2022 3:37 PM EDT by Jossy Castillo      P 66 71  73   /54  100/54 114/45 110/48    MAP:  73.67 mmHg    (X) * Mental status at baseline    4/28/2022 3:37 PM EDT by sivakumar Lewis      alert/oriented    ( ) * No precipitating cause identified, or cause manageable in outpatient setting    4/28/2022 3:37 PM EDT by sivakumar Lewis      IV Zithromax & Rocephin started. (X) * Acidosis absent    4/28/2022 3:37 PM EDT by Jossy Castillo      no reported events    ( ) * Blood glucose under acceptable control    4/28/2022 3:37 PM EDT by sivakumar Lewis      POC Glucose: 211 (H)    (X) * Dehydration absent    4/28/2022 3:37 PM EDT by Mali Bee      Hemodialysis per nephrology, may benefit from extra fluid removal    ( ) * Electrolyte abnormalities absent or acceptable for next level of care    4/28/2022 3:37 PM EDT by sivakumar Lewis      Sodium: 134 (L)  Chloride: 97 (L)  Creatinine: 2.78 (H)    ( ) * Renal function at baseline or acceptable for next level of care    4/28/2022 3:37 PM EDT by sivakumar Lewis      Creatinine: 2.78 (H)    on hemodialysis , re- started due to worsening fluid overload 1 week ago.     ( ) * Discharge plans and education understood    Activity    (X) * Ambulatory or acceptable for next level of care    4/28/2022 3:37 PM EDT by sivakumar Lewis      up as tolerated    Routes    (X) * Oral hydration    4/28/2022 3:37 PM EDT by Mali Bee      [id number] ml    (X) * Oral medications or regimen acceptable for next level of care    4/28/2022 3:37 PM EDT by Velma Alvine, Jossy      traZODone (DESYREL) ,sodium bicarbonate,ranolazine (RANEXA),pantoprazole (PROTONIX),melatonin,furosemide (LASIX),febuxostat (ULORIC)    (X) * Oral diet or acceptable for next level of care    4/28/2022 3:37 PM EDT by sivakumar Thomas      ADULT DIET; Regular; 4 carb choices    Interventions    (X) Bedside glucose monitoring    4/28/2022 3:37 PM EDT by Olivia Duty      POCT monitoring    POC Glucose: 211 (H)    (X) Chemistries    4/28/2022 3:37 PM EDT by Jossy Umaña      U/A with cultures & sensitivities ordered. (X) Diabetic education    4/28/2022 3:37 PM EDT by Jossy Umaña      carb control diet continued. Medications    ( ) * Outpatient diabetic medication regimen established    4/28/2022 3:37 PM EDT by Olivia Galeano      DKA - resolved, hypoglycemic episodes continue, regular Lantus dose decreased to 50 units BID    (X) Subcutaneous insulin    4/28/2022 3:37 PM EDT by Jossy Umaña      insulin glargine (LANTUS) injection vial 50 Units  insulin lispro (HUMALOG) injection vial 0-18 Units  insulin lispro (HUMALOG) injection vial 0-9 Units    * Milestone   Additional Notes   DATE:    4/27/22               Pertinent Updates:   She is currently on 3 L nasal cannula pulse ox 98%.  She did have a fever overnight of 103. 1.  She had some mild desaturation during her fever.  She was started on Rocephin and Zithromax per primary service.  Chest x-ray yesterday showed worsening vascular congestion with possible infiltrate.  She did have hemodialysis yesterday.    The myoclonic jerks appear to be improved. MRI brain repeated and does not reveal any acute abnormalities.                Vitals:   T 98.3 (36.8) 99.1 (37.3) 99.4 (37.4)   R 18   P 66 71  73    /54  100/54 114/45 110/48   O2:  3L/min    SPO2:  98      MAP:  73.67 mmHg           Abnl/Pertinent Labs/Radiology/Diagnostic Studies:   Sodium: 134 (L)   Chloride: 97 (L)   Creatinine: 2.78 (H)   GFR Non-: 17 (L)   GFR : 21 (L)   GLUCOSE, FASTING,GF: 127 (H)   RBC: 2.77 (L)   Hemoglobin Quant: 8.6 (L)   Hematocrit: 25.5 (L) RDW: 15.9 (H)   Platelet Count: 412 (L)   Seg Neutrophils: 74 (H)   Lymphocytes: 14 (L)   Absolute Lymph #: 0.50 (L)   Monocytes: 10 (H)   POC Glucose: 211 (H)               Physical Exam:   Respiratory: positive for shortness of breath   Cardiovascular: positive for lower extremity edema   Musculoskeletal:positive for arthralgias and myalgias           MD Consults/Assessments & Plans:   NEURO:   Impression   1. Postoperative changes from C5-C7 without significant spinal canal stenosis. 2. Neural foraminal narrowing as above. 3. 1.6 cm right thyroid nodule.  Ultrasound is recommended for further   characterization.       RECOMMENDATIONS:   Managing Incidental Thyroid Nodule Detected at CT or MRI or US       1.  Further evaluation by thyroid Ultrasound recommended for these incidental   nodules:       Patient Age 25 years or less - Nodule of any size       Patient Age 21-27 years old - Nodule 1 cm in size or greater       Patient Age 28 years or more - Nodule 1.5 cm in size or greater       2. Follow up thyroid ultrasound also recommend in these scenarios       - Solitary nodule with high risk imaging features (locally invasive nodule or   suspicious lymph nodes)       - Heterogeneous, enlarged thyroid gland.       - Increased uptake on PET       3.  NO further imaging is recommended in the following scenarios       - Any nodule not meeting above criteria.       - Those patients with limited life expectancy or significant co-morbidities.       Note: These recommendations do not apply to pts. w/ increased risk for   thyroid cancer or pts. with symptomatic thyroid disease.           NEPHRO:      Assessment/Plan:       1.  End-stage renal disease - on hemodialysis by way of a right-sided tunneled catheter, transiently taken off dialysis 6 weeks ago and re- started due to worsening fluid overload 1 week ago. She still has some residual renal function [500 mL urine per day].    We will maintain TTS hemodialysis schedule. Renal diet,i.e 2-gram sodium,2-gram potassium,1500 ml fluid restriction,1-gram phosphorus, 1800 KCal and 1.2 gram protein per day.       2.  Systemic hypertension - Blood pressure control is adequate.       3.  Normocytic anemia of chronic kidney disease - Hemoglobin has trended down to 8.6 g/dL today. Kianna Galeas will start Retacrit 3000 units subcutaneously 3 times a week.       4.  Mineral and bone disease profile - Serum phosphorus 4.2 mg/dL [4/25/2022] is within target range    Prognosis is guarded.       PULMO:      ASSESSMENT:       1. RLL pneumonia/atelectasis versus fluid volume excess   2. DKA - resolved   3. Altered mental status, improved   4. HTN urgency   5. UTI-completed ceftriaxone, culture with no signficant growth   6. ESRD on hemodialysis, TTS   7. Acute on Chronic diastolic heart failure-Grade 1   8. Syncopal episode    9. Chronic hypoxic respiratory insufficiency-on 2 L chronically   10. ERICK (clinical diagnosis)   11. Obesity   12. Full code          PLAN:   1. Wean oxygen to keep pulse ox above 91%   2. Hemodialysis per nephrology, may benefit from extra fluid removal   3. Would benefit from outpatient sleep study   4. She previously completed a course of Rocephin for UTI, given her admission was on 4/13 we will change antibiotics to IV cefepime   5. Unable to follow-up with our office outpatient secondary to insurance, would benefit from outpatient follow-up with [place] pulmonology after discharge       FM:   Plan:   -DKA - resolved, hypoglycemic episodes continue, regular Lantus dose decreased to 50 units BID, SS coverage and carb control diet continued.   -Chronic hypoxic respiratory failure - on continuous oxygen per NC, O2 needs have increased, Pulmonology managing. -ESRD on dialysis - Nephrology managing.   -Acute on chronic CHF exacerbation - volume status being managed by Nephrology. -UTI - completed Rocephin, new U/A with cultures & sensitivities ordered.    -TIA like symptoms - MRI & MRA negative, EEG ordered, Neurology following. -RLL infiltrate - re-demonstrated on repeat CXR, IV Zithromax & Rocephin started. -Generalized weakness - PT/OT treating. -D/C planning ongoing - PM&R evaluating for possible transfer to ARU . -Complete orders per chart.           Medications:   apixaban (ELIQUIS) tablet 5 mg   Dose: 5 mg   Freq: 2 TIMES DAILY Route: PO   spirin EC tablet 81 mg   Dose: 81 mg   Freq: DAILY Route: PO   atorvastatin (LIPITOR) tablet 80 mg   Dose: 80 mg   Freq: DAILY Route: PO   azithromycin (ZITHROMAX) 500 mg in D5W 250ml addavial   Dose: 500 mg   Freq: EVERY 24 HOURS Route: IV   busPIRone (BUSPAR) tablet 7.5 mg   Dose: 7.5 mg   Freq: 3 TIMES DAILY Route: PO   carvedilol (COREG) tablet 3.125 mg   Dose: 3.125 mg   Freq: 2 TIMES DAILY Route: PO   cefepime (MAXIPIME) 2000 mg IVPB minibag   Dose: 2,000 mg   Freq: ONCE Route: IV   docusate sodium (COLACE) capsule 100 mg   Dose: 100 mg   Freq: 2 TIMES DAILY Route: PO   febuxostat (ULORIC) tablet 40 mg   Dose: 40 mg   Freq: DAILY Route: PO   furosemide (LASIX) tablet 80 mg   Dose: 80 mg   Freq: DAILY Route: PO   insulin glargine (LANTUS) injection vial 50 Units   Dose: 50 Units   Freq: 2 TIMES DAILY Route: SC   insulin lispro (HUMALOG) injection vial 0-18 Units   Dose: 0-18 Units   Freq: 3 TIMES DAILY WITH MEALS Route: SC   insulin lispro (HUMALOG) injection vial 0-9 Units   Dose: 0-9 Units   Freq: NIGHTLY Route: SC   melatonin tablet 9 mg   Dose: 9 mg   Freq: NIGHTLY Route: PO   miconazole (MICOTIN) 2 % powder   Freq: 2 TIMES DAILY Route: TOP   pantoprazole (PROTONIX) tablet 40 mg   Dose: 40 mg   Freq: DAILY BEFORE BREAKFAST Route: PO   ranolazine (RANEXA) extended release tablet 1,000 mg   Dose: 1,000 mg   Freq: 2 TIMES DAILY Route: PO   sodium bicarbonate tablet 650 mg   Dose: 650 mg   Freq: 3 TIMES DAILY Route: PO   traZODone (DESYREL) tablet 75 mg   Dose: 75 mg   Freq: NIGHTLY Route: PO           Orders:   ADULT DIET;  Regular; 4 carb choices        PT/OT/SLP/CM Assessments or Notes:   CM:   [address] [place] [address] is unable to accept pt due to her insurance being a Commercial insurance. [address] [place] [address] can only accept [employer]. [facility] at \[facility]\"" can accept and is able to transport pt to and from dialysis. SW spoke with pt and pt's sister at bedside and they are agreeable to [facility] at \[facility]".  Pre-cert was started on this date, 4/27/2022.       OT:   Assessment     Activity Tolerance: Patient tolerated treatment well;Patient limited by endurance;Treatment limited secondary to decreased cognition   Discharge Recommendations: Patient would benefit from continued therapy after discharge    PT:   Discharge Recommendations: Patient would benefit from continued therapy after discharge;Subacute/Skilled Nursing Facility               Diabetes - Care Day 12 (4/24/2022) by Henry Nowak RN       Review Status Review Entered   Completed 4/28/2022 14:22      Criteria Review      Care Day: 12 Care Date: 4/24/2022 Level of Care: Intermediate Care    Guideline Day 2    Clinical Status    (X) * Hypotension absent    (X) * Mental status at baseline    ( ) * Acidosis absent or improved    ( ) * Blood glucose under acceptable control or improved    (X) * Dehydration absent    ( ) * Electrolyte abnormalities absent or improved    ( ) * Renal function at baseline or improved    Activity    ( ) * Ambulatory    Routes    ( ) * Oral hydration    (X) * Oral medications    (X) [place] diet, advance as tolerated    4/28/2022 2:22 PM EDT by Zackary Holter      Carb control    Interventions    (X) Serum glucose, serum ketones, chemistries, ABGs or venous pH measurements, urinalysis    4/28/2022 2:22 PM EDT by Zackary Holter      see narrative for labs    (X) Bedside glucose monitoring    Medications    ( ) * IV insulin absent    ( ) * Outpatient diabetic medication regimen initiated    (X) Subcutaneous insulin    * Milestone   Additional Notes DATE: 4/24      Pertinent Updates:   potassium recheck received and if potassium is greater than 5.4 to give Lokelma 10g once. Repeat 5.5   _++++++++++++++++++++++++++++++      Vitals:   99.2  71  16  111/52  POX 94%         Abnl/Pertinent Labs/Radiology/Diagnostic Studies:   Sodium: 132 (L)   Potassium: 5.6 (H)   Chloride: 96 (L)   Creatinine: 2.61 (H)   GFR Non-: 18 (L)   GFR : 22 (L)   GLUCOSE, FASTING,GF: 252 (H)         RBC: 3.11 (L)   Hemoglobin Quant: 9.6 (L)   Hematocrit: 29.2 (L)      Platelet Count: 544 (L)         ABG's   pH, Arterial: 7.429   pCO2, Arterial: 45.9 (H)   pO2, Arterial: 47.6 (LL)   HCO3, Arterial: 30.4 (H)   Positive Base Excess, Art: 6.1 (H)   O2 Sat, Arterial: 82.8 (LL)   O2 3L/NC   ++++++++++++++++++++++++++++++++++++++++++   MD Consults/Assessments & Plans:      Nephrology   Interval history:   Patient seen and examined today   Had dialysis yesterday with 2 L removed.  Completed 3 hours of treatment- potassium was elevated at 5.6   Recheck 5.5 this morning.  Dialysis catheter functioned well. Assessment/   1.  End-stage renal disease on hemodialysis by way of a right-sided tunneled catheter, transiently taken off dialysis 6 weeks ago and re- started due to worsening fluid overload 1 week ago.    Patient still makes urine proximately 400 to 500 cc daily       2.  Hypertensive urgency- improving       3.  Acute DKA currently on DKA protocol-resolved       4.  Hypokalemia #2 to osmotic diuresis and  total body depletion-improved       5.  CHF with diastolic dysfunction and evidence of fluid overload pulmonary vascular congestion on chest xray       6.  Urinary tract infection with pyuria       7.  Thrombocytopenia-improving       8.  Acute hyperkalemia   Plan/   HD TTS, hemodialysis as per schedule   Lokelma 5 g 3 times daily   2 g potassium diet and check potassium at 7 PM   Lasix 80 mg daily   Basic metabolic panel daily   Midodrine  and albumin with dialysis.      +++++++++++++++++++++++++++++       Medications:   PO  Eliquis 5mg q 12h   PO ASA 81mg qd   PO Lipitor 80mg qd   PO Coreg 3.125mg q 12h   PO Lasix 80mg qd   PO Neurontin 300mg q 12h   SQ Insulin   PO Protonix 40mg qd   PO Na Bicarb 650mg q 8h   PO Lokelma 10g x1 and 5g q 8h   +++++++++++++++++++++++++++++++++++++++++++++      CM   Writer reviewed LSW notes, and discharge plan is for Pt. To go to ARU.        Pt. Has Been Denied, However, there will be a P2P on Monday.        [facility], is following, if not accepted at ARU.        ++++++++++++++++++++      NN   updated Dr Abhilash Wang that patient was having increased confusion as the day progress. Orders for ammonia level and ABGs to be drawn received. 1405: RN updated Dr Abhilash Wang with ammonia and ABG results. Dr Abhilash Wang stated to update pulm on results. 1420: RN updated Dr Viri Mckeon with ammonia levels and ABG results. Reviewed that patient is currently 94% on 3L O2 and that 3L is the patient's baseline. Dr Viri Mckeon stated that pulmonary would not be doing a further workup at this time. 1540: RN reached out to Dr Justa Gonzalez with neurology to update on increased confusion compared to yesterday.  Dr Justa Gonzalez stated \"does not believe this is a neurological issue but a metabolic one and he would order a chest x-ray and would see the patient tomorrow\".                  Diabetes - Care Day 9 (4/21/2022) by Feliberto Echeverria RN       Review Status Review Entered   Completed 4/22/2022 11:16      Criteria Review      Care Day: 9 Care Date: 4/21/2022 Level of Care: Intermediate Care    Guideline Day 2    Clinical Status    (X) * Hypotension absent    (X) * Mental status at baseline    (X) * Acidosis absent or improved    ( ) * Blood glucose under acceptable control or improved    (X) * Dehydration absent    ( ) * Electrolyte abnormalities absent or improved    ( ) * Renal function at baseline or improved    Activity    ( ) * Ambulatory    4/22/2022 11:16 AM EDT by Vasile James      assist    Routes    ( ) * Oral hydration    (X) * Oral medications    4/22/2022 11:16 AM EDT by Vasile James      see narrative for meds    (X) [place] diet, advance as tolerated    4/22/2022 11:16 AM EDT by Vasile James      ADULT DIET; Regular; 4 carb choices (60 gm/meal); 1500 ml [[id number]]    Interventions    (X) Serum glucose, serum ketones, chemistries, ABGs or venous pH measurements, urinalysis    4/22/2022 11:16 AM EDT by Vasile James      CBC BMP Gluc    (X) Bedside glucose monitoring    Medications    ( ) * IV insulin absent    ( ) * Outpatient diabetic medication regimen initiated    (X) Subcutaneous insulin    * Milestone   Additional Notes   DATE: 4/21      Vitals:   98.4 (36.9) 18 61 112/54   POX 97% O2 3l/NC   ++++++++++++++++++++++++++++++++++++++++   Abnl/Pertinent Labs/Radiology/Diagnostic Studies:      Sodium: 134 (L)      BUN,BUNPL: 31 (H)   Creatinine: 4.16 (H)      GFR Non-: 11 (L)         RBC: 3.00 (L)   Hemoglobin Quant: 9.4 (L)   Hematocrit: 28.3 (L)   Platelet Count: 391 (L)      +++++++++++++++++++++++++++++++++++++++   MD Consults/Assessments & Plans:   Nephrology   Assessment/   1.  End-stage renal disease on hemodialysis by way of a right-sided tunneled catheter, transiently taken off dialysis 6 weeks ago and re- started due to worsening fluid overload 1 week ago.    Patient still makes urine proximately 400 to 500 cc daily       2.  Hypertensive urgency- improving       3.  Acute DKA currently on DKA protocol-resolved       4.  Hypokalemia #2 to osmotic diuresis and  total body depletion-improved       5.  CHF with diastolic dysfunction and evidence of fluid overload pulmonary vascular congestion on chest xray       6.  Urinary tract infection with pyuria       7.  Thrombocytopenia-improving       Plan/   HD TTS    Lasix to 80 mg daily   Basic metabolic panel daily   ________________________________________________   Pulmonology   ASSESSMENT:         1. RLL pneumonia/atelectasis versus fluid volume excess   2. DKA - resolved   3. HTN urgency   4. UTI- on Ceftriaxone, culture with no signficant growth   5. ESRD on hemodialysis   6. Acute on Chronic diastolic heart failure-Grade 1   7. Syncopal episode yesterday thought to be related to orthostatic hypotension   8. Chronic hypoxic respiratory insufficiency-on 2 L   9. ERICK (clinical diagnosis)   10. Obesity   PLAN:   1. Completed abx for UTI   2. On home dose of oxygen   3. Likely needs sleep study as an outpatient   4. Can f/u in our office in 4 weeks, she did call our office and we do  Not take her insurance, encouraged her to follow up with pulm   5. No objection to discharge to acute rehab, we can follow with her there as needed   6. D/w charge RN         _____________________________________________________   Medications:   PO ASA 81mg qd      PO Lipitor 80mg qd      PO Coreg 3.125mg q 12h      PO Lasix 80mg q 12h   SQ Heaprin 5000u q 8h   SQ Insulin   PO Na Bicarb 650mg q 8h      +++++++++++++++++++++++++++++++++++++++++++++         Orders:   Tele   Dialysis this day   ++++++++++++++++++++++++++++++++++         PT   Transfers:   Sit to Stand: Contact guard assistance   Stand to sit: Contact guard assistance   Bed to Chair: Contact guard assistance   Ambulation   Surface: level tile   Device: Rolling Walker   Assistance: Contact guard assistance   Quality of Gait: slight forward flexed posture- able to correct with cueing; steady but slow moving   Gait Deviations: Slow Annie;Decreased step length;Decreased step height   Distance: 10ft x2   Comments: patients B LEs started to have tremors when patioent was amb. back to bed       +++++++++++++++++++         CM  Pre-cert was started for [facility] on this date, 4/21/2022. Jennifer requesting clarification if pt will start on Eliquis or if they will continue on Heprin.

## 2022-04-29 NOTE — CARE COORDINATION
Patient DC to SNF from hospital. ACM will follow up with patient once home.   Marguerite Miles RN, ambulatory care manager  PCP notified

## 2022-05-02 ENCOUNTER — HOSPITAL ENCOUNTER (OUTPATIENT)
Age: 62
Setting detail: SPECIMEN
Discharge: HOME | End: 2022-05-02

## 2022-05-02 LAB
ANION GAP SERPL CALCULATED.3IONS-SCNC: 12 MMOL/L (ref 9–17)
BUN BLDV-MCNC: 26 MG/DL (ref 8–23)
CALCIUM SERPL-MCNC: 8.5 MG/DL (ref 8.6–10.4)
CHLORIDE BLD-SCNC: 93 MMOL/L (ref 98–107)
CO2: 30 MMOL/L (ref 20–31)
CREAT SERPL-MCNC: 3.93 MG/DL (ref 0.5–0.9)
GFR AFRICAN AMERICAN: 14 ML/MIN
GFR NON-AFRICAN AMERICAN: 12 ML/MIN
GFR SERPL CREATININE-BSD FRML MDRD: ABNORMAL ML/MIN/{1.73_M2}
GLUCOSE BLD-MCNC: 354 MG/DL (ref 70–99)
HCT VFR BLD CALC: 27.4 % (ref 36.3–47.1)
HEMOGLOBIN: 8.7 G/DL (ref 11.9–15.1)
MCH RBC QN AUTO: 31 PG (ref 25.2–33.5)
MCHC RBC AUTO-ENTMCNC: 31.8 G/DL (ref 28.4–34.8)
MCV RBC AUTO: 97.5 FL (ref 82.6–102.9)
NRBC AUTOMATED: 0 PER 100 WBC
PDW BLD-RTO: 15.2 % (ref 11.8–14.4)
PLATELET # BLD: 185 K/UL (ref 138–453)
PMV BLD AUTO: 11.2 FL (ref 8.1–13.5)
POTASSIUM SERPL-SCNC: 3.7 MMOL/L (ref 3.7–5.3)
RBC # BLD: 2.81 M/UL (ref 3.95–5.11)
SODIUM BLD-SCNC: 135 MMOL/L (ref 135–144)
WBC # BLD: 4.7 K/UL (ref 3.5–11.3)

## 2022-05-02 PROCEDURE — 36415 COLL VENOUS BLD VENIPUNCTURE: CPT

## 2022-05-02 PROCEDURE — P9603 ONE-WAY ALLOW PRORATED MILES: HCPCS

## 2022-05-02 PROCEDURE — 85027 COMPLETE CBC AUTOMATED: CPT

## 2022-05-02 PROCEDURE — 80048 BASIC METABOLIC PNL TOTAL CA: CPT

## 2022-05-09 ENCOUNTER — HOSPITAL ENCOUNTER (OUTPATIENT)
Age: 62
Setting detail: SPECIMEN
Discharge: HOME | End: 2022-05-09

## 2022-05-09 PROCEDURE — 87186 SC STD MICRODIL/AGAR DIL: CPT

## 2022-05-09 PROCEDURE — 87077 CULTURE AEROBIC IDENTIFY: CPT

## 2022-05-09 PROCEDURE — 87086 URINE CULTURE/COLONY COUNT: CPT

## 2022-05-09 PROCEDURE — 81001 URINALYSIS AUTO W/SCOPE: CPT

## 2022-05-10 LAB
-: ABNORMAL
BACTERIA: ABNORMAL
BILIRUBIN URINE: ABNORMAL
CASTS UA: ABNORMAL /LPF (ref 0–8)
COLOR: ABNORMAL
EPITHELIAL CELLS UA: ABNORMAL /HPF (ref 0–5)
GLUCOSE URINE: ABNORMAL
KETONES, URINE: NEGATIVE
LEUKOCYTE ESTERASE, URINE: ABNORMAL
NITRITE, URINE: POSITIVE
PH UA: 5 (ref 5–8)
PROTEIN UA: ABNORMAL
RBC UA: ABNORMAL /HPF (ref 0–4)
SPECIFIC GRAVITY UA: 1.02 (ref 1–1.03)
TURBIDITY: ABNORMAL
URINE HGB: NEGATIVE
UROBILINOGEN, URINE: NORMAL
WBC UA: ABNORMAL /HPF (ref 0–5)
YEAST: ABNORMAL

## 2022-05-11 NOTE — DISCHARGE SUMMARY
[facility] Discharge Summary      Patient ID: Eufemia Lepe    MRN: [id number]     Acct:  [de-identified]       Patient's PCP: AFSANEH Duong CNP    Admit Date: 4/13/2022     Discharge Date: 4/28/2022      Admitting Physician: Precious Streeter MD    Discharge Physician: Precious Streeter MD     Discharge Diagnoses:    Primary Problem  Diabetic ketoacidosis without coma associated with type 2 diabetes mellitus [facility])    Active Hospital Problems    Diagnosis Date Noted    Confusion [R41.0]      Priority: Medium    Myoclonic jerking [G25.3]      Priority: Medium    Hypokalemia [E87.6] 04/14/2022    Diabetic ketoacidosis without coma associated with type 2 diabetes mellitus ([facility] [E11.10] 04/13/2022    End-stage renal disease ([facility] [N18.6] 02/14/2022    Chronic midline low back pain with bilateral sciatica [M54.41, M54.42, G89.29] 09/30/2021    Long-term memory loss [R41.3] 09/30/2021    Anxiety [F41.9] 09/30/2021    Morbid obesity with BMI of 40.0-44.9, adult ([facility] [E66.01, Z68.41] 09/14/2021    Chronic ischemic heart disease [I25.9] 07/23/2021    Essential hypertension [I10] 05/03/2021    Diabetic polyneuropathy associated with type 2 diabetes mellitus ([facility] [E11.42] 02/17/2020    Chronic diastolic heart failure ([facility] [I50.32] 09/20/2018    Type 2 diabetes 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, with long-term current use of insulin ([facility] [E11.22, N18.6, Z99.2, Z79.4] 09/20/2018    Mixed hyperlipidemia [E78.2] 07/27/2016    Atherosclerosis of coronary artery bypass graft of native heart without angina pectoris [I25.810] 05/04/2015     Past Medical History:   Diagnosis Date    Backache, unspecified     CHF (congestive heart failure) (HCC)     Chronic kidney disease     Coronary atherosclerosis of artery bypass graft     COVID 1/31/2022    Cramp of limb     Gallstones     Hypertension     Insomnia     Pneumonia     Type II or unspecified type diabetes mellitus with renal manifestations, not stated as uncontrolled(250.40)     Type II or unspecified type diabetes mellitus without mention of complication, not stated as uncontrolled     Unspecified vitamin D deficiency      The patient was seen and examined on day of discharge and this discharge summary is in conjunction with any daily progress note from day of discharge. Code Status:  Prior    Hospital Course: extended    Consults:  cardiology, pulmonary/intensive care, nephrology, neurology and rehabilitation medicine    Significant Diagnostic Studies: as above, and as follows: see chart    Treatments: as above    Disposition: SNF    Discharged Condition: Stable    Follow Up:  AFSANEH Fisher CNP in one week after discharge from SNF    Discharge Medications:      Medication List      START taking these medications    miconazole 2 % powder  Commonly known as: MICOTIN  Apply topically 2 times daily.         CHANGE how you take these medications    Basaglar KwikPen 100 UNIT/ML injection pen  Generic drug: insulin glargine  Inject 60 Units into the skin 2 times daily  What changed: how much to take        CONTINUE taking these medications    acetaminophen 325 MG tablet  Commonly known as: TYLENOL     aspirin 81 MG chewable tablet  Take 1 tablet by mouth daily     atorvastatin 80 MG tablet  Commonly known as: LIPITOR  Take 1 tablet by mouth daily     busPIRone 7.5 MG tablet  Commonly known as: BUSPAR  Take 1 tablet by mouth 3 times daily     carvedilol 3.125 MG tablet  Commonly known as: COREG     docusate sodium 100 MG capsule  Commonly known as: COLACE  Take 1 capsule by mouth 2 times daily     Easy Touch Pen Needles 29G X 12MM Misc  Generic drug: Insulin Pen Needle  1 each by Does not apply route daily     febuxostat 40 MG Tabs tablet  Commonly known as: ULORIC  Take 1 tablet by mouth daily     furosemide 80 MG tablet  Commonly known as: LASIX  Take 1 tablet by mouth 2 times daily     gabapentin 300 MG capsule  Commonly known as: NEURONTIN  Take 1 capsule by mouth 2 times daily for 30 days. HumaLOG 100 UNIT/ML Soln injection vial  Generic drug: insulin lispro     * Lancets Misc  1 each by Does not apply route daily     * ReliOn Lancets Micro-Thin 33G Misc     lidocaine-prilocaine 2.5-2.5 % cream  Commonly known as: EMLA     melatonin 10 MG Caps capsule     pantoprazole 40 MG tablet  Commonly known as: PROTONIX  Take 1 tablet by mouth every morning (before breakfast)     ranolazine 1000 MG extended release tablet  Commonly known as: RANEXA  Take 1 tablet by mouth 2 times daily     sodium bicarbonate 650 MG tablet  Take 1 tablet by mouth 3 times daily     tamsulosin 0.4 MG capsule  Commonly known as: FLOMAX  Take 1 capsule by mouth daily     traZODone 50 MG tablet  Commonly known as: DESYREL  TAKE 1 & 1/2 (ONE & ONE-HALF) TABLETS BY MOUTH NIGHTLY     True Metrix Go Glucose Meter w/Device Kit  1 each by Does not apply route 4 times daily         * This list has 2 medication(s) that are the same as other medications prescribed for you. Read the directions carefully, and ask your doctor or other care provider to review them with you. ASK your doctor about these medications    amoxicillin-clavulanate 500-125 MG per tablet  Commonly known as: Augmentin  Take 1 tablet by mouth daily for 5 days  Ask about: Should I take this medication? cephALEXin 500 MG capsule  Commonly known as: KEFLEX  Take 1 capsule by mouth 2 times daily for 7 days  Ask about: Should I take this medication?     fluconazole 100 MG tablet  Commonly known as: DIFLUCAN  Take 1 tablet by mouth daily for 7 days  Ask about: Should I take this medication?            Where to Get Your Medications      These medications were sent to [facility]  [address]  [address], [facility] AT THE [facility] [address]    Hours: [facility] (Mon-Fri 9AM-5:30PM) Phone: [phone] · amoxicillin-clavulanate 500-125 MG per tablet     These medications were sent to [place], [address] [place] [id number]    Phone: [phone]   · Basaglar KwikPen 100 UNIT/ML injection pen  · cephALEXin 500 MG capsule  · fluconazole 100 MG tablet  · miconazole 2 % powder          Activity: activity as tolerated    Diet: diabetic diet and renal diet    Time Spent on discharge is more than 40 minutes in the examination, evaluation, counseling and review of medications and discharge plan.       Electronically signed by Precious Streeter MD on 5/10/2022 at 10:34 PM

## 2022-05-13 LAB
CULTURE: ABNORMAL
CULTURE: ABNORMAL
Lab: ABNORMAL
SPECIMEN DESCRIPTION: ABNORMAL

## 2022-05-16 ENCOUNTER — HOSPITAL ENCOUNTER (OUTPATIENT)
Age: 62
Setting detail: SPECIMEN
Discharge: HOME | End: 2022-05-16

## 2022-05-16 LAB
ANION GAP SERPL CALCULATED.3IONS-SCNC: 11 MMOL/L (ref 9–17)
BUN BLDV-MCNC: 21 MG/DL (ref 8–23)
BUN/CREAT BLD: 6 (ref 9–20)
CALCIUM SERPL-MCNC: 8.8 MG/DL (ref 8.6–10.4)
CHLORIDE BLD-SCNC: 95 MMOL/L (ref 98–107)
CO2: 30 MMOL/L (ref 20–31)
CREAT SERPL-MCNC: 3.49 MG/DL (ref 0.5–0.9)
GFR AFRICAN AMERICAN: 16 ML/MIN
GFR NON-AFRICAN AMERICAN: 13 ML/MIN
GFR SERPL CREATININE-BSD FRML MDRD: ABNORMAL ML/MIN/{1.73_M2}
GLUCOSE BLD-MCNC: 88 MG/DL (ref 70–99)
HCT VFR BLD CALC: 27.9 % (ref 36.3–47.1)
HEMOGLOBIN: 8.3 G/DL (ref 11.9–15.1)
MCH RBC QN AUTO: 30.6 PG (ref 25.2–33.5)
MCHC RBC AUTO-ENTMCNC: 29.7 G/DL (ref 28.4–34.8)
MCV RBC AUTO: 103 FL (ref 82.6–102.9)
NRBC AUTOMATED: 0 PER 100 WBC
PDW BLD-RTO: 15.9 % (ref 11.8–14.4)
PLATELET # BLD: 174 K/UL (ref 138–453)
PMV BLD AUTO: 11 FL (ref 8.1–13.5)
POTASSIUM SERPL-SCNC: 3.9 MMOL/L (ref 3.7–5.3)
RBC # BLD: 2.71 M/UL (ref 3.95–5.11)
SODIUM BLD-SCNC: 136 MMOL/L (ref 135–144)
WBC # BLD: 3.8 K/UL (ref 3.5–11.3)

## 2022-05-16 PROCEDURE — 36415 COLL VENOUS BLD VENIPUNCTURE: CPT

## 2022-05-16 PROCEDURE — 80048 BASIC METABOLIC PNL TOTAL CA: CPT

## 2022-05-16 PROCEDURE — P9603 ONE-WAY ALLOW PRORATED MILES: HCPCS

## 2022-05-16 PROCEDURE — 85027 COMPLETE CBC AUTOMATED: CPT

## 2022-06-02 ENCOUNTER — HOSPITAL ENCOUNTER (OUTPATIENT)
Age: 62
Setting detail: SPECIMEN
Discharge: HOME | End: 2022-06-02

## 2022-06-02 LAB
HCT VFR BLD CALC: 29.4 % (ref 36.3–47.1)
HEMOGLOBIN: 8.9 G/DL (ref 11.9–15.1)
MCH RBC QN AUTO: 30.8 PG (ref 25.2–33.5)
MCHC RBC AUTO-ENTMCNC: 30.3 G/DL (ref 28.4–34.8)
MCV RBC AUTO: 101.7 FL (ref 82.6–102.9)
NRBC AUTOMATED: 0 PER 100 WBC
PDW BLD-RTO: 15.9 % (ref 11.8–14.4)
PLATELET # BLD: 135 K/UL (ref 138–453)
PMV BLD AUTO: 11 FL (ref 8.1–13.5)
RBC # BLD: 2.89 M/UL (ref 3.95–5.11)
WBC # BLD: 4.5 K/UL (ref 3.5–11.3)

## 2022-06-02 PROCEDURE — P9603 ONE-WAY ALLOW PRORATED MILES: HCPCS

## 2022-06-02 PROCEDURE — 36415 COLL VENOUS BLD VENIPUNCTURE: CPT

## 2022-06-02 PROCEDURE — 85027 COMPLETE CBC AUTOMATED: CPT

## 2022-06-06 ENCOUNTER — NURSE TRIAGE (OUTPATIENT)
Dept: OTHER | Facility: CLINIC | Age: 62
End: 2022-06-06

## 2022-06-06 ENCOUNTER — HOSPITAL ENCOUNTER (INPATIENT)
Age: 62
LOS: 6 days | Discharge: HOME HEALTH | DRG: 637 | End: 2022-06-12
Attending: STUDENT IN AN ORGANIZED HEALTH CARE EDUCATION/TRAINING PROGRAM | Admitting: FAMILY MEDICINE
Payer: COMMERCIAL

## 2022-06-06 DIAGNOSIS — R73.9 HYPERGLYCEMIA: Primary | ICD-10-CM

## 2022-06-06 DIAGNOSIS — E87.6 HYPOKALEMIA: ICD-10-CM

## 2022-06-06 LAB
-: ABNORMAL
ABSOLUTE EOS #: 0.09 K/UL (ref 0–0.4)
ABSOLUTE LYMPH #: 0.31 K/UL (ref 1–4.8)
ABSOLUTE MONO #: 0.28 K/UL (ref 0.1–1.3)
ALLEN TEST: ABNORMAL
AMORPHOUS: ABNORMAL
ANION GAP SERPL CALCULATED.3IONS-SCNC: 11 MMOL/L (ref 9–17)
BACTERIA: ABNORMAL
BASOPHILS # BLD: 0 % (ref 0–2)
BASOPHILS ABSOLUTE: 0 K/UL (ref 0–0.2)
BILIRUBIN URINE: NEGATIVE
BUN BLDV-MCNC: 23 MG/DL (ref 8–23)
CALCIUM SERPL-MCNC: 8.9 MG/DL (ref 8.6–10.4)
CARBOXYHEMOGLOBIN: 5.6 % (ref 0–5)
CHLORIDE BLD-SCNC: 101 MMOL/L (ref 98–107)
CO2: 28 MMOL/L (ref 20–31)
COLOR: YELLOW
CREAT SERPL-MCNC: 2.44 MG/DL (ref 0.5–0.9)
EOSINOPHILS RELATIVE PERCENT: 3 % (ref 0–4)
EPITHELIAL CELLS UA: ABNORMAL /HPF
GFR AFRICAN AMERICAN: 24 ML/MIN
GFR NON-AFRICAN AMERICAN: 20 ML/MIN
GFR SERPL CREATININE-BSD FRML MDRD: ABNORMAL ML/MIN/{1.73_M2}
GLUCOSE BLD-MCNC: 206 MG/DL (ref 65–105)
GLUCOSE BLD-MCNC: 460 MG/DL (ref 70–99)
GLUCOSE BLD-MCNC: 509 MG/DL (ref 65–105)
GLUCOSE URINE: ABNORMAL
HCO3 VENOUS: 23.8 MMOL/L (ref 24–30)
HCT VFR BLD CALC: 26.3 % (ref 36–46)
HEMOGLOBIN: 8.7 G/DL (ref 12–16)
INFLUENZA A: NOT DETECTED
INFLUENZA B: NOT DETECTED
KETONES, URINE: NEGATIVE
LEUKOCYTE ESTERASE, URINE: ABNORMAL
LYMPHOCYTES # BLD: 10 % (ref 24–44)
MCH RBC QN AUTO: 31.1 PG (ref 26–34)
MCHC RBC AUTO-ENTMCNC: 33.2 G/DL (ref 31–37)
MCV RBC AUTO: 93.9 FL (ref 80–100)
METHEMOGLOBIN: 0.6 % (ref 0–1.9)
MONOCYTES # BLD: 9 % (ref 1–7)
MORPHOLOGY: ABNORMAL
MORPHOLOGY: ABNORMAL
MUCUS: ABNORMAL
NITRITE, URINE: NEGATIVE
O2 SAT, VEN: 94 % (ref 60–85)
PATIENT TEMP: 37
PCO2, VEN: 26.4 (ref 39–55)
PDW BLD-RTO: 17.7 % (ref 11.5–14.9)
PH UA: 6.5 (ref 5–8)
PH VENOUS: 7.56 (ref 7.32–7.42)
PLATELET # BLD: 120 K/UL (ref 150–450)
PMV BLD AUTO: 8.2 FL (ref 6–12)
PO2, VEN: 175 (ref 30–50)
POSITIVE BASE EXCESS, VEN: 1.7 MMOL/L (ref 0–2)
POTASSIUM SERPL-SCNC: 3.1 MMOL/L (ref 3.7–5.3)
PROTEIN UA: ABNORMAL
RBC # BLD: 2.81 M/UL (ref 4–5.2)
RBC UA: ABNORMAL /HPF
REASON FOR REJECTION: NORMAL
REASON FOR REJECTION: NORMAL
SAMPLE SITE: ABNORMAL
SARS-COV-2 RNA, RT PCR: NOT DETECTED
SEG NEUTROPHILS: 78 % (ref 36–66)
SEGMENTED NEUTROPHILS ABSOLUTE COUNT: 2.42 K/UL (ref 1.3–9.1)
SODIUM BLD-SCNC: 140 MMOL/L (ref 135–144)
SOURCE: NORMAL
SPECIFIC GRAVITY UA: 1.02 (ref 1–1.03)
SPECIMEN DESCRIPTION: NORMAL
TURBIDITY: ABNORMAL
URINE HGB: ABNORMAL
UROBILINOGEN, URINE: NORMAL
WBC # BLD: 3.1 K/UL (ref 3.5–11)
WBC UA: ABNORMAL /HPF
ZZ NTE CLEAN UP: ORDERED TEST: NORMAL
ZZ NTE CLEAN UP: ORDERED TEST: NORMAL
ZZ NTE WITH NAME CLEAN UP: SPECIMEN SOURCE: NORMAL
ZZ NTE WITH NAME CLEAN UP: SPECIMEN SOURCE: NORMAL

## 2022-06-06 PROCEDURE — 6370000000 HC RX 637 (ALT 250 FOR IP): Performed by: FAMILY MEDICINE

## 2022-06-06 PROCEDURE — 6360000002 HC RX W HCPCS: Performed by: FAMILY MEDICINE

## 2022-06-06 PROCEDURE — 6370000000 HC RX 637 (ALT 250 FOR IP): Performed by: STUDENT IN AN ORGANIZED HEALTH CARE EDUCATION/TRAINING PROGRAM

## 2022-06-06 PROCEDURE — 82800 BLOOD PH: CPT

## 2022-06-06 PROCEDURE — 80048 BASIC METABOLIC PNL TOTAL CA: CPT

## 2022-06-06 PROCEDURE — 2580000003 HC RX 258: Performed by: STUDENT IN AN ORGANIZED HEALTH CARE EDUCATION/TRAINING PROGRAM

## 2022-06-06 PROCEDURE — 36415 COLL VENOUS BLD VENIPUNCTURE: CPT

## 2022-06-06 PROCEDURE — 82947 ASSAY GLUCOSE BLOOD QUANT: CPT

## 2022-06-06 PROCEDURE — 87077 CULTURE AEROBIC IDENTIFY: CPT

## 2022-06-06 PROCEDURE — 6360000002 HC RX W HCPCS: Performed by: STUDENT IN AN ORGANIZED HEALTH CARE EDUCATION/TRAINING PROGRAM

## 2022-06-06 PROCEDURE — 99285 EMERGENCY DEPT VISIT HI MDM: CPT

## 2022-06-06 PROCEDURE — 87186 SC STD MICRODIL/AGAR DIL: CPT

## 2022-06-06 PROCEDURE — 85025 COMPLETE CBC W/AUTO DIFF WBC: CPT

## 2022-06-06 PROCEDURE — 87636 SARSCOV2 & INF A&B AMP PRB: CPT

## 2022-06-06 PROCEDURE — 93005 ELECTROCARDIOGRAM TRACING: CPT | Performed by: STUDENT IN AN ORGANIZED HEALTH CARE EDUCATION/TRAINING PROGRAM

## 2022-06-06 PROCEDURE — 81001 URINALYSIS AUTO W/SCOPE: CPT

## 2022-06-06 PROCEDURE — 87086 URINE CULTURE/COLONY COUNT: CPT

## 2022-06-06 PROCEDURE — 82805 BLOOD GASES W/O2 SATURATION: CPT

## 2022-06-06 PROCEDURE — 2060000000 HC ICU INTERMEDIATE R&B

## 2022-06-06 RX ORDER — CARVEDILOL 3.12 MG/1
3.12 TABLET ORAL 2 TIMES DAILY
Status: DISCONTINUED | OUTPATIENT
Start: 2022-06-06 | End: 2022-06-12 | Stop reason: HOSPADM

## 2022-06-06 RX ORDER — POTASSIUM CHLORIDE 7.45 MG/ML
10 INJECTION INTRAVENOUS
Status: ACTIVE | OUTPATIENT
Start: 2022-06-06 | End: 2022-06-06

## 2022-06-06 RX ORDER — SODIUM CHLORIDE 9 MG/ML
INJECTION, SOLUTION INTRAVENOUS PRN
Status: DISCONTINUED | OUTPATIENT
Start: 2022-06-06 | End: 2022-06-12 | Stop reason: HOSPADM

## 2022-06-06 RX ORDER — LANOLIN ALCOHOL/MO/W.PET/CERES
9 CREAM (GRAM) TOPICAL NIGHTLY
Status: DISCONTINUED | OUTPATIENT
Start: 2022-06-06 | End: 2022-06-12 | Stop reason: HOSPADM

## 2022-06-06 RX ORDER — ASPIRIN 81 MG/1
81 TABLET, CHEWABLE ORAL DAILY
Status: DISCONTINUED | OUTPATIENT
Start: 2022-06-07 | End: 2022-06-12 | Stop reason: HOSPADM

## 2022-06-06 RX ORDER — ACETAMINOPHEN 325 MG/1
650 TABLET ORAL EVERY 4 HOURS PRN
Status: DISCONTINUED | OUTPATIENT
Start: 2022-06-06 | End: 2022-06-12 | Stop reason: HOSPADM

## 2022-06-06 RX ORDER — FUROSEMIDE 40 MG/1
80 TABLET ORAL 2 TIMES DAILY
Status: DISCONTINUED | OUTPATIENT
Start: 2022-06-06 | End: 2022-06-12 | Stop reason: HOSPADM

## 2022-06-06 RX ORDER — DOCUSATE SODIUM 100 MG/1
100 CAPSULE, LIQUID FILLED ORAL 2 TIMES DAILY
Status: DISCONTINUED | OUTPATIENT
Start: 2022-06-06 | End: 2022-06-12 | Stop reason: HOSPADM

## 2022-06-06 RX ORDER — CIPROFLOXACIN 2 MG/ML
400 INJECTION, SOLUTION INTRAVENOUS EVERY 12 HOURS
Status: DISCONTINUED | OUTPATIENT
Start: 2022-06-06 | End: 2022-06-09

## 2022-06-06 RX ORDER — POTASSIUM CHLORIDE 7.45 MG/ML
10 INJECTION INTRAVENOUS ONCE
Status: DISCONTINUED | OUTPATIENT
Start: 2022-06-06 | End: 2022-06-06

## 2022-06-06 RX ORDER — DEXTROSE MONOHYDRATE 50 MG/ML
100 INJECTION, SOLUTION INTRAVENOUS PRN
Status: DISCONTINUED | OUTPATIENT
Start: 2022-06-06 | End: 2022-06-12 | Stop reason: HOSPADM

## 2022-06-06 RX ORDER — TRAZODONE HYDROCHLORIDE 50 MG/1
75 TABLET ORAL NIGHTLY
Status: DISCONTINUED | OUTPATIENT
Start: 2022-06-06 | End: 2022-06-12 | Stop reason: HOSPADM

## 2022-06-06 RX ORDER — HEPARIN SODIUM 5000 [USP'U]/ML
5000 INJECTION, SOLUTION INTRAVENOUS; SUBCUTANEOUS EVERY 8 HOURS SCHEDULED
Status: DISCONTINUED | OUTPATIENT
Start: 2022-06-06 | End: 2022-06-12 | Stop reason: HOSPADM

## 2022-06-06 RX ORDER — POTASSIUM CHLORIDE 20 MEQ/1
40 TABLET, EXTENDED RELEASE ORAL ONCE
Status: COMPLETED | OUTPATIENT
Start: 2022-06-06 | End: 2022-06-06

## 2022-06-06 RX ORDER — FEBUXOSTAT 40 MG/1
40 TABLET, FILM COATED ORAL DAILY
Status: DISCONTINUED | OUTPATIENT
Start: 2022-06-07 | End: 2022-06-12 | Stop reason: HOSPADM

## 2022-06-06 RX ORDER — SODIUM CHLORIDE 0.9 % (FLUSH) 0.9 %
5-40 SYRINGE (ML) INJECTION PRN
Status: DISCONTINUED | OUTPATIENT
Start: 2022-06-06 | End: 2022-06-12 | Stop reason: HOSPADM

## 2022-06-06 RX ORDER — MAGNESIUM SULFATE HEPTAHYDRATE 40 MG/ML
2000 INJECTION, SOLUTION INTRAVENOUS ONCE
Status: DISCONTINUED | OUTPATIENT
Start: 2022-06-06 | End: 2022-06-12 | Stop reason: HOSPADM

## 2022-06-06 RX ORDER — PANTOPRAZOLE SODIUM 40 MG/1
40 TABLET, DELAYED RELEASE ORAL
Status: DISCONTINUED | OUTPATIENT
Start: 2022-06-07 | End: 2022-06-12 | Stop reason: HOSPADM

## 2022-06-06 RX ORDER — ONDANSETRON 4 MG/1
4 TABLET, ORALLY DISINTEGRATING ORAL EVERY 8 HOURS PRN
Status: DISCONTINUED | OUTPATIENT
Start: 2022-06-06 | End: 2022-06-12 | Stop reason: HOSPADM

## 2022-06-06 RX ORDER — BUSPIRONE HYDROCHLORIDE 10 MG/1
10 TABLET ORAL 3 TIMES DAILY
Status: DISCONTINUED | OUTPATIENT
Start: 2022-06-06 | End: 2022-06-12 | Stop reason: HOSPADM

## 2022-06-06 RX ORDER — ATORVASTATIN CALCIUM 80 MG/1
80 TABLET, FILM COATED ORAL DAILY
Status: DISCONTINUED | OUTPATIENT
Start: 2022-06-07 | End: 2022-06-12 | Stop reason: HOSPADM

## 2022-06-06 RX ORDER — TAMSULOSIN HYDROCHLORIDE 0.4 MG/1
0.4 CAPSULE ORAL DAILY
Status: DISCONTINUED | OUTPATIENT
Start: 2022-06-07 | End: 2022-06-12 | Stop reason: HOSPADM

## 2022-06-06 RX ORDER — ACETAMINOPHEN 325 MG/1
650 TABLET ORAL EVERY 6 HOURS PRN
Status: DISCONTINUED | OUTPATIENT
Start: 2022-06-06 | End: 2022-06-06 | Stop reason: SDUPTHER

## 2022-06-06 RX ORDER — SODIUM BICARBONATE 650 MG/1
650 TABLET ORAL 3 TIMES DAILY
Status: DISCONTINUED | OUTPATIENT
Start: 2022-06-06 | End: 2022-06-12 | Stop reason: HOSPADM

## 2022-06-06 RX ORDER — LIDOCAINE AND PRILOCAINE 25; 25 MG/G; MG/G
CREAM TOPICAL PRN
Status: DISCONTINUED | OUTPATIENT
Start: 2022-06-06 | End: 2022-06-12 | Stop reason: HOSPADM

## 2022-06-06 RX ORDER — INSULIN LISPRO 100 [IU]/ML
0-6 INJECTION, SOLUTION INTRAVENOUS; SUBCUTANEOUS NIGHTLY
Status: DISCONTINUED | OUTPATIENT
Start: 2022-06-06 | End: 2022-06-12 | Stop reason: HOSPADM

## 2022-06-06 RX ORDER — INSULIN GLARGINE 100 [IU]/ML
40 INJECTION, SOLUTION SUBCUTANEOUS EVERY MORNING
Status: DISCONTINUED | OUTPATIENT
Start: 2022-06-07 | End: 2022-06-08

## 2022-06-06 RX ORDER — INSULIN LISPRO 100 [IU]/ML
0-12 INJECTION, SOLUTION INTRAVENOUS; SUBCUTANEOUS
Status: DISCONTINUED | OUTPATIENT
Start: 2022-06-07 | End: 2022-06-12 | Stop reason: HOSPADM

## 2022-06-06 RX ORDER — ONDANSETRON 2 MG/ML
4 INJECTION INTRAMUSCULAR; INTRAVENOUS EVERY 6 HOURS PRN
Status: DISCONTINUED | OUTPATIENT
Start: 2022-06-06 | End: 2022-06-12 | Stop reason: HOSPADM

## 2022-06-06 RX ORDER — RANOLAZINE 500 MG/1
1000 TABLET, EXTENDED RELEASE ORAL 2 TIMES DAILY
Status: DISCONTINUED | OUTPATIENT
Start: 2022-06-06 | End: 2022-06-12 | Stop reason: HOSPADM

## 2022-06-06 RX ORDER — SODIUM CHLORIDE 0.9 % (FLUSH) 0.9 %
5-40 SYRINGE (ML) INJECTION EVERY 12 HOURS SCHEDULED
Status: DISCONTINUED | OUTPATIENT
Start: 2022-06-06 | End: 2022-06-12 | Stop reason: HOSPADM

## 2022-06-06 RX ADMIN — SODIUM CHLORIDE, PRESERVATIVE FREE 10 ML: 5 INJECTION INTRAVENOUS at 21:52

## 2022-06-06 RX ADMIN — RANOLAZINE 1000 MG: 500 TABLET, EXTENDED RELEASE ORAL at 21:52

## 2022-06-06 RX ADMIN — BUSPIRONE HYDROCHLORIDE 10 MG: 10 TABLET ORAL at 21:53

## 2022-06-06 RX ADMIN — HEPARIN SODIUM 5000 UNITS: 5000 INJECTION INTRAVENOUS; SUBCUTANEOUS at 21:54

## 2022-06-06 RX ADMIN — POTASSIUM CHLORIDE 40 MEQ: 20 TABLET, EXTENDED RELEASE ORAL at 17:01

## 2022-06-06 RX ADMIN — ACETAMINOPHEN 650 MG: 325 TABLET ORAL at 21:56

## 2022-06-06 RX ADMIN — SODIUM BICARBONATE 650 MG TABLET 650 MG: at 21:52

## 2022-06-06 RX ADMIN — CIPROFLOXACIN 400 MG: 2 INJECTION, SOLUTION INTRAVENOUS at 21:47

## 2022-06-06 RX ADMIN — FUROSEMIDE 80 MG: 40 TABLET ORAL at 21:54

## 2022-06-06 RX ADMIN — TRAZODONE HYDROCHLORIDE 75 MG: 50 TABLET ORAL at 21:53

## 2022-06-06 RX ADMIN — INSULIN LISPRO 2 UNITS: 100 INJECTION, SOLUTION INTRAVENOUS; SUBCUTANEOUS at 21:59

## 2022-06-06 RX ADMIN — CARVEDILOL 3.12 MG: 3.12 TABLET, FILM COATED ORAL at 21:53

## 2022-06-06 RX ADMIN — Medication 9 MG: at 21:53

## 2022-06-06 RX ADMIN — DOCUSATE SODIUM 100 MG: 100 CAPSULE, LIQUID FILLED ORAL at 22:05

## 2022-06-06 ASSESSMENT — LIFESTYLE VARIABLES
HOW MANY STANDARD DRINKS CONTAINING ALCOHOL DO YOU HAVE ON A TYPICAL DAY: 1 OR 2
HOW OFTEN DO YOU HAVE A DRINK CONTAINING ALCOHOL: MONTHLY OR LESS

## 2022-06-06 ASSESSMENT — PAIN - FUNCTIONAL ASSESSMENT: PAIN_FUNCTIONAL_ASSESSMENT: NONE - DENIES PAIN

## 2022-06-06 ASSESSMENT — ENCOUNTER SYMPTOMS
RHINORRHEA: 0
VOMITING: 0
SHORTNESS OF BREATH: 0
COUGH: 0
BACK PAIN: 0
ABDOMINAL PAIN: 0
NAUSEA: 0

## 2022-06-06 ASSESSMENT — PAIN DESCRIPTION - DESCRIPTORS: DESCRIPTORS: ACHING

## 2022-06-06 ASSESSMENT — PAIN DESCRIPTION - LOCATION: LOCATION: BACK

## 2022-06-06 ASSESSMENT — PAIN SCALES - GENERAL: PAINLEVEL_OUTOF10: 0

## 2022-06-06 NOTE — ED NOTES
SBAR report called to Heath Smith RN on PCU, updated to patient condition and plan of care, updated to pertinent lab and diagnostic tests. Pt is ready for transport upstairs.       Darrius Granados RN  06/06/22 0920

## 2022-06-06 NOTE — ED TRIAGE NOTES
Pt arrives via EMS c/o high blood glucose, pt checked her BG this morning and it read in 400s and she took home insulin, then she took a nap and when she woke up glucose reading high again. She took 40 units long acting and 18 units short acting approx 30 minutes PTA.

## 2022-06-06 NOTE — ED NOTES
Pt states she does not want straight cath, requests water and then she states she can void. Provider notified and okayed giving patient water.       Harvinder Loo RN  06/06/22 [id number]

## 2022-06-06 NOTE — ED PROVIDER NOTES
[facility]  Emergency Department Encounter  Emergency Medicine Physician     Pt Name: Ridge Mckinney  MRN: [id number]  [place] 1960  Date of evaluation: 6/6/22  PCP:  AFSANEH Zhou CNP    CHIEF COMPLAINT       Chief Complaint   Patient presents with    Hyperglycemia       HISTORY OF PRESENT ILLNESS  (Location/Symptom, Timing/Onset, Context/Setting, Quality, Duration, Modifying Factors, Severity.)    Ridge Mckinney is a 57 y.o. female who presents with concern for hyperglycemia. Patient states that she has had trouble controlling her blood sugars for the past several weeks. She states that they are usually running in the 200s. States that she has been taking her insulin regimen as prescribed. Was discharged from [facility] in May 10 for DKA and then spent some time in an acute rehab. Endorses some body aches, denies any fevers or chills. Denies any chest pain or shortness of breath. States that she still does make urine. She states that last time she had DKA was triggered from a urinary tract infection. Denies any changes to her insulin regimen. History of end-stage renal disease, Tuesday, Thursday, Saturday dialysis        PAST MEDICAL / SURGICAL / SOCIAL / FAMILY HISTORY    has a past medical history of Backache, unspecified, CHF (congestive heart failure) ([facility] [address] Chronic kidney disease, Coronary atherosclerosis of artery bypass graft, COVID, Cramp of limb, Gallstones, Hypertension, Insomnia, Pneumonia, Type II or unspecified type diabetes mellitus with renal manifestations, not stated as uncontrolled(250.40), Type II or unspecified type diabetes mellitus without mention of complication, not stated as uncontrolled, and Unspecified vitamin D deficiency. has a past surgical history that includes Coronary artery bypass graft; Knee arthroscopy; Carpal tunnel release; Breast surgery; Tonsillectomy; Hand surgery; Ankle fracture surgery;  Cholecystectomy, open (N/A); IR TUNNELED CVC PLACE WO SQ PORT/PUMP > 5 YEARS (8/18/2021); AV fistula creation (12/14/2021); Dialysis fistula creation (Left, 12/14/2021); and other surgical history (04/06/2022). Social History     Socioeconomic History    Marital status: Single     Spouse name: Not on file    Number of children: Not on file    Years of education: Not on file    Highest education level: Not on file   Occupational History    Not on file   Tobacco Use    Smoking status: Never Smoker    Smokeless tobacco: Never Used   Vaping Use    Vaping Use: Never used   Substance and Sexual Activity    Alcohol use: No    Drug use: No    Sexual activity: Not Currently   Other Topics Concern    Not on file   Social History Narrative    Not on file     Social Determinants of Health     Financial Resource Strain: Low Risk     Difficulty of Paying Living Expenses: Not very hard   Food Insecurity: No Food Insecurity    Worried About Running Out of Food in the Last Year: Never true    Nany of Food in the Last Year: Never true   Transportation Needs: No Transportation Needs    Lack of Transportation (Medical): No    Lack of Transportation (Non-Medical): No   Physical Activity: Inactive    Days of Exercise per Week: 0 days    Minutes of Exercise per Session: 0 min   Stress: Stress Concern Present    Feeling of Stress :  To some extent   Social Connections: Socially Isolated    Frequency of Communication with Friends and Family: More than three times a week    Frequency of Social Gatherings with Friends and Family: More than three times a week    Attends Restorationism Services: Never    Active Member of Clubs or Organizations: No    Attends Club or Organization Meetings: Never    Marital Status: Never    Intimate Partner Violence:     Fear of Current or Ex-Partner: Not on file    Emotionally Abused: Not on file    Physically Abused: Not on file    Sexually Abused: Not on file   Housing Stability: [address]  Unable to Pay for Housing in the Last Year: No    Number of Places Lived in the Last Year: 1    Unstable Housing in the Last Year: No       Family History   Problem Relation Age of Onset    Diabetes Father     Heart Failure Father        Allergies:    Adhesive tape, Ace inhibitors, Iv dye [iodides], Nsaids, and Metformin and related    Home Medications:  Prior to Admission medications    Medication Sig Start Date End Date Taking? Authorizing Provider   insulin glargine (BASAGLAR KWIKPEN) 100 UNIT/ML injection pen Inject 60 Units into the skin 2 times daily  Patient taking differently: Inject 60 Units into the skin 2 times daily Indications: 40units once daily  4/20/22   Yolanda Brunner MD   miconazole (MICOTIN) 2 % powder Apply topically 2 times daily. 4/20/22   Yolanda Brunner MD   melatonin 10 MG CAPS capsule Take 10 mg by mouth nightly    Historical Provider, MD   lidocaine-prilocaine (EMLA) 2.5-2.5 % cream Apply topically as needed for Pain Indications: Apply to dialysis site Apply topically as needed.   Patient not taking: Reported on 6/6/2022    Historical Provider, MD   carvedilol (COREG) 3.125 MG tablet Take 3.125 mg by mouth 2 times daily    Historical Provider, MD   traZODone (DESYREL) 50 MG tablet TAKE 1 & 1/2 (ONE & ONE-HALF) TABLETS BY MOUTH NIGHTLY 4/11/22   AFSANEH Aragon CNP   acetaminophen (TYLENOL) 325 MG tablet Take 650 mg by mouth every 6 hours as needed for Pain    Historical Provider, MD   furosemide (LASIX) 80 MG tablet Take 1 tablet by mouth 2 times daily 3/8/22   AFSANEH Aragon CNP   sodium bicarbonate 650 MG tablet Take 1 tablet by mouth 3 times daily 3/2/22   Carlos Maunel Guthrie MD   Insulin Pen Needle (EASY TOUCH PEN NEEDLES) 29G X 12MM MISC 1 each by Does not apply route daily 3/1/22   AFSANEH Aragon CNP   ReliOn Lancets Micro-Thin 33G MISC USE AS DIRECTED EVERY DAY 1/28/22   Historical Provider, MD   HUMALOG 100 UNIT/ML injection vial Inject into the skin 3 times daily (before meals) Indications: 15 units and sliding scale (patient reports only the sliding scale)  11/29/21   Historical Provider, MD   Blood Glucose Monitoring Suppl (TRUE METRIX GO GLUCOSE METER) w/Device KIT 1 each by Does not apply route 4 times daily 11/30/21   AFSANEH Morgan CNP   Lancets MISC 1 each by Does not apply route daily 11/30/21   AFSANEH Morgan CNP   febuxostat (ULORIC) 40 MG TABS tablet Take 1 tablet by mouth daily 11/22/21   Germán Byers MD   docusate sodium (COLACE) 100 MG capsule Take 1 capsule by mouth 2 times daily 11/22/21   Germán Byers MD   tamsulosin [facility]) 0.4 MG capsule Take 1 capsule by mouth daily 11/23/21   Germán Byers MD   atorvastatin (LIPITOR) 80 MG tablet Take 1 tablet by mouth daily 11/3/21   AFSANEH Morgan CNP   aspirin 81 MG chewable tablet Take 1 tablet by mouth daily 9/25/21   Prakash Prieto MD   ranolazine (RANEXA) 1000 MG extended release tablet Take 1 tablet by mouth 2 times daily 9/25/21   Prakash Prieto MD   busPIRone (BUSPAR) 7.5 MG tablet Take 1 tablet by mouth 3 times daily  Patient taking differently: Take 10 mg by mouth 3 times daily  9/25/21   Prakash Prieto MD   pantoprazole (PROTONIX) 40 MG tablet Take 1 tablet by mouth every morning (before breakfast) 9/25/21   Prakash Prieto MD   allopurinol (ZYLOPRIM) 100 MG tablet Take 1 tablet by mouth daily 4/14/21   AFSANEH Morgan CNP       REVIEW OF SYSTEMS    (2-9 systems for level 4, 10 or more for level 5)    Review of Systems   Constitutional: Positive for fatigue. Negative for chills and fever. HENT: Negative for congestion and rhinorrhea. Respiratory: Negative for cough and shortness of breath. Cardiovascular: Negative for chest pain. Gastrointestinal: Negative for abdominal pain, nausea and vomiting. Endocrine: Negative for polydipsia and polyuria. Genitourinary: Negative for dysuria and flank pain. Musculoskeletal: Positive for myalgias.  Negative for back pain. Skin: Positive for pallor. Neurological: Negative for headaches. All other systems reviewed and are negative. PHYSICAL EXAM   (up to 7 for level 4, 8 or more for level 5)    INITIAL VITALS:   ED Triage Vitals [22 1424]   BP Temp Temp Source Heart Rate Resp SpO2 Height Weight   (!) 129/48 99.2 °F (37.3 °C) Oral 78 17 99 % 5' 5\" (1.651 m) 272 lb (123.4 kg)       Physical Exam  Vitals and nursing note reviewed. Constitutional:       General: She is not in acute distress. Appearance: She is well-developed. She is obese. She is not ill-appearing. Cardiovascular:      Rate and Rhythm: Normal rate and regular rhythm. Pulses: Normal pulses. Radial pulses are 2+ on the right side and 2+ on the left side. Posterior tibial pulses are 2+ on the right side and 2+ on the left side. Heart sounds: Normal heart sounds. No murmur heard. Pulmonary:      Effort: Pulmonary effort is normal. No respiratory distress. Breath sounds: Normal breath sounds. No decreased breath sounds, wheezing or rhonchi. Abdominal:      General: There is no distension. Palpations: Abdomen is soft. Tenderness: There is no abdominal tenderness. There is no right CVA tenderness or left CVA tenderness. Musculoskeletal:      Right lower le+ Edema present. Left lower le+ Edema present. Skin:     General: Skin is warm and dry. Capillary Refill: Capillary refill takes less than 2 seconds. Neurological:      General: No focal deficit present. Mental Status: She is alert and oriented to person, place, and time.          DIFFERENTIAL  DIAGNOSIS   PLAN (LABS / IMAGING / EKG):  Orders Placed This Encounter   Procedures    COVID-19 & Influenza Combo    Culture, Urine    Blood Gas, Venous    SPECIMEN REJECTION    Urinalysis with Reflex to Culture    SPECIMEN REJECTION    Basic Metabolic Panel    CBC with Auto Differential    Microscopic Urinalysis  CBC    Comprehensive Metabolic Panel    ADULT DIET; Regular; 3 carb choices (45 gm/meal);  Low Sodium (2 gm)    Vital signs per unit routine    Notify physician    Notify physician    Up with assistance    Telemetry monitoring - 72 hour duration    HYPOGLYCEMIA TREATMENT: blood glucose less than 50 mg/dL and patient  ALERT and TOLERATING PO    HYPOGLYCEMIA TREATMENT: blood glucose less than 70 mg/dL and patient ALERT and TOLERATING PO    HYPOGLYCEMIA TREATMENT: blood glucose less than 70 mg/dL and patient NOT ALERT or NPO    Full Code    Inpatient consult to Internal Medicine    Inpatient consult to Nephrology    POC Glucose Fingerstick    POCT Glucose    POC Glucose Fingerstick    EKG 12 Lead    ADMIT TO INPATIENT       MEDICATIONS ORDERED:  Orders Placed This Encounter   Medications    potassium chloride (KLOR-CON M) extended release tablet 40 mEq    magnesium sulfate 2000 mg in water 50 mL IVPB    DISCONTD: potassium chloride 10 mEq/100 mL IVPB (Peripheral Line)    sodium chloride flush 0.9 % injection 5-40 mL    sodium chloride flush 0.9 % injection 5-40 mL    0.9 % sodium chloride infusion    acetaminophen (TYLENOL) tablet 650 mg    OR Linked Order Group     ondansetron (ZOFRAN-ODT) disintegrating tablet 4 mg     ondansetron (ZOFRAN) injection 4 mg    heparin (porcine) injection 5,000 Units    potassium chloride 10 mEq/100 mL IVPB (Peripheral Line)    cefTRIAXone (ROCEPHIN) 1000 mg IVPB in NS 50ml minibag     Order Specific Question:   Antimicrobial Indications     Answer:   Urinary Tract Infection    aspirin chewable tablet 81 mg    ranolazine (RANEXA) extended release tablet 1,000 mg    busPIRone (BUSPAR) tablet 10 mg    pantoprazole (PROTONIX) tablet 40 mg    atorvastatin (LIPITOR) tablet 80 mg    febuxostat (ULORIC) tablet 40 mg    docusate sodium (COLACE) capsule 100 mg    tamsulosin (FLOMAX) capsule 0.4 mg    sodium bicarbonate tablet 650 mg    furosemide (LASIX) tablet 80 mg    DISCONTD: acetaminophen (TYLENOL) tablet 650 mg    traZODone (DESYREL) tablet 75 mg    melatonin tablet 9 mg    lidocaine-prilocaine (EMLA) cream    carvedilol (COREG) tablet 3.125 mg    insulin glargine (LANTUS) injection vial 40 Units    miconazole (MICOTIN) 2 % powder    insulin lispro (HUMALOG) injection vial 0-12 Units    insulin lispro (HUMALOG) injection vial 0-6 Units    glucose chewable tablet 16 g    OR Linked Order Group     dextrose bolus 10% 125 mL     dextrose bolus 10% 250 mL    glucagon (rDNA) injection 1 mg    dextrose 5 % solution    ciprofloxacin (CIPRO) IVPB 400 mg     Order Specific Question:   Antimicrobial Indications     Answer:   Urinary Tract Infection     Order Specific Question:   UTI duration of therapy     Answer:   7 days         DIAGNOSTIC RESULTS / EMERGENCYDEPARTMENT COURSE / MDM   LABS:  Labs Reviewed   BLOOD GAS, VENOUS - Abnormal; Notable for the following components:       Result Value    pH, Felix 7.564 (*)     pCO2, Felix 26.4 (*)     pO2, Felix 175.0 (*)     HCO3, Venous 23.8 (*)     O2 Sat, Felix 94.0 (*)     Carboxyhemoglobin 5.6 (*)     All other components within normal limits   URINALYSIS WITH REFLEX TO CULTURE - Abnormal; Notable for the following components:    Turbidity UA Turbid (*)     Glucose, Ur LARGE (*)     Urine Hgb TRACE (*)     Protein, UA 1+ (*)     Leukocyte Esterase, Urine MOD (*)     All other components within normal limits   BASIC METABOLIC PANEL - Abnormal; Notable for the following components:    Glucose 460 (*)     CREATININE 2.44 (*)     Potassium 3.1 (*)     GFR Non- 20 (*)     GFR  24 (*)     All other components within normal limits   CBC WITH AUTO DIFFERENTIAL - Abnormal; Notable for the following components:    WBC 3.1 (*)     RBC 2.81 (*)     Hemoglobin 8.7 (*)     Hematocrit 26.3 (*)     RDW 17.7 (*)     Platelets 424 (*)     Seg Neutrophils 78 (*)     Lymphocytes 10 (*) Monocytes 9 (*)     Absolute Lymph # 0.31 (*)     All other components within normal limits   MICROSCOPIC URINALYSIS - Abnormal; Notable for the following components:    Bacteria, UA MANY (*)     Mucus, UA 1+ (*)     Amorphous, UA 1+ (*)     All other components within normal limits   POC GLUCOSE FINGERSTICK - Abnormal; Notable for the following components:    POC Glucose 509 (*)     All other components within normal limits   POC GLUCOSE FINGERSTICK - Abnormal; Notable for the following components:    POC Glucose 206 (*)     All other components within normal limits   COVID-19 & INFLUENZA COMBO   CULTURE, URINE   SPECIMEN REJECTION   SPECIMEN REJECTION   CBC   COMPREHENSIVE METABOLIC PANEL       RADIOLOGY:  No results found. EKG    EKG Interpretation    Interpreted by me    Rhythm: Sinus rhythm  Rate: normal 73  Axis: normal  Ectopy: Single PAC  Conduction: normal  ST Segments: no acute change  T Waves: no acute change  Q Waves: none    Clinical Impression: Sinus rhythm rate of 73. No ST segment changes, no arrhythmia. Single PAC. Normal to rightward axis, poor R wave progression        All EKG's are interpreted by the Emergency Department Physician whoeither signs or Co-signs this chart in the absence of a cardiologist.    Impression:  Patient presents by EMS. Blood sugar per EMS read high. Patient took some subcutaneous insulin prior to their arrival.  Having difficulty controlling blood sugars. Complicated by end-stage renal disease. Elevated glucose here. Will check labs, get a VBG, EKG, covid and influenza swab. Will check urinalysis. Unable to give fluids at this time secondary to the fact the patient is end-stage renal disease.       EMERGENCY DEPARTMENT COURSE:  ED Course as of 06/06/22 2103 Mon Jun 06, 2022   1526 SARS-CoV-2 RNA, RT PCR: Not Detected [AP]   6712 INFLUENZA A: Not Detected [AP]   3400 INFLUENZA B: Not Detected [AP]      ED Course User Index  [AP] Lance Fung DO Patient demonstrating hyperglycemia. Does not appear to be in DKA. However given patient's hyperglycemia, patient will require insulin. It appears that her insulin regimen at home is in adequate. Urine analysis eventually came back with concerning for infection so ceftriaxone was ordered. However patient's potassium low at 3.1. Discussed this with patient's primary care attending. Agree that it is not safe to start insulin at this time given patient's low potassium. I am unable to give large amounts of IV potassium given patient's history of end-stage renal disease which means that the oral potassium will take some time for the blood potassium level to actually replenish. Spoke with nephrology as well. They did state the patient could have 20 mEq of IV potassium which was ordered in addition to some IV magnesium. Patient will be admitted for hyperglycemia and hyperkalemia treatment. PROCEDURES:  none    CONSULTS:  IP CONSULT TO INTERNAL MEDICINE  IP CONSULT TO NEPHROLOGY    CRITICAL CARE:  There was a high probability of clinically significant/life threatening deterioration in this patient's condition which required my urgent intervention. Total critical care time was 15 minutes. This excludes any time for separately reportable procedures. FINAL IMPRESSION     1. Hyperglycemia    2. Hypokalemia          DISPOSITION / PLAN   DISPOSITION Admitted 06/06/2022 05:13:06 PM        PATIENT REFERRED TO:  No follow-up provider specified.     DISCHARGE MEDICATIONS:  Current Discharge Medication List          Keaton Beckett DO  Emergency Medicine Attending    (Please note that portions of this note were completed with a voice recognition program.  Efforts were made to edit the dictations but occasionally words are mis-transcribed.)         Keaton Beckett DO  06/06/22 4250

## 2022-06-06 NOTE — TELEPHONE ENCOUNTER
Received call from Brittany at [facility] with [employer]. Subjective: Caller states \"my sugar has been running 200-400. This morning it was 404 fasting and just now it said \"HI\"\"     Glucose meter is ready an error code    Current Symptoms: high blood glucose, fatigue    Onset: 3 days ago; worsening      Pain Severity: 0/10; N/A; none    Temperature: denies     What has been tried: NA    Recommended disposition: See in Office Today    Care advice provided, patient verbalizes understanding; denies any other questions or concerns; instructed to call back for any new or worsening symptoms. Patient/Caller agrees with recommended disposition; writer provided warm transfer to [employer] at [facility] for appointment scheduling     Attention Provider: Thank you for allowing me to participate in the care of your patient. The patient was connected to triage in response to information provided to the ECC/PSC. Please do not respond through this encounter as the response is not directed to a shared pool.       Reason for Disposition   Symptoms of high blood sugar (e.g., frequent urination, weak, weight loss) and not able to test blood glucose    Protocols used: DIABETES - HIGH BLOOD SUGAR-ADULT-OH

## 2022-06-07 LAB
ALBUMIN SERPL-MCNC: 2.7 G/DL (ref 3.5–5.2)
ALP BLD-CCNC: 83 U/L (ref 35–104)
ALT SERPL-CCNC: 13 U/L (ref 5–33)
ANION GAP SERPL CALCULATED.3IONS-SCNC: 11 MMOL/L (ref 9–17)
AST SERPL-CCNC: 30 U/L
BILIRUB SERPL-MCNC: 0.68 MG/DL (ref 0.3–1.2)
BUN BLDV-MCNC: 21 MG/DL (ref 8–23)
CALCIUM SERPL-MCNC: 8.5 MG/DL (ref 8.6–10.4)
CHLORIDE BLD-SCNC: 103 MMOL/L (ref 98–107)
CO2: 25 MMOL/L (ref 20–31)
CREAT SERPL-MCNC: 2.28 MG/DL (ref 0.5–0.9)
EKG ATRIAL RATE: 73 BPM
EKG P AXIS: 62 DEGREES
EKG P-R INTERVAL: 156 MS
EKG Q-T INTERVAL: 384 MS
EKG QRS DURATION: 96 MS
EKG QTC CALCULATION (BAZETT): 423 MS
EKG R AXIS: -3 DEGREES
EKG T AXIS: -176 DEGREES
EKG VENTRICULAR RATE: 73 BPM
GFR AFRICAN AMERICAN: 26 ML/MIN
GFR NON-AFRICAN AMERICAN: 22 ML/MIN
GFR SERPL CREATININE-BSD FRML MDRD: ABNORMAL ML/MIN/{1.73_M2}
GLUCOSE BLD-MCNC: 144 MG/DL (ref 65–105)
GLUCOSE BLD-MCNC: 171 MG/DL (ref 70–99)
GLUCOSE BLD-MCNC: 210 MG/DL (ref 65–105)
GLUCOSE BLD-MCNC: 286 MG/DL (ref 65–105)
GLUCOSE BLD-MCNC: 305 MG/DL (ref 65–105)
HCT VFR BLD CALC: 26.5 % (ref 36–46)
HEMOGLOBIN: 8.6 G/DL (ref 12–16)
MCH RBC QN AUTO: 31.1 PG (ref 26–34)
MCHC RBC AUTO-ENTMCNC: 32.3 G/DL (ref 31–37)
MCV RBC AUTO: 96.3 FL (ref 80–100)
PDW BLD-RTO: 17.8 % (ref 11.5–14.9)
PLATELET # BLD: 151 K/UL (ref 150–450)
PMV BLD AUTO: 8.5 FL (ref 6–12)
POTASSIUM SERPL-SCNC: 3.6 MMOL/L (ref 3.7–5.3)
RBC # BLD: 2.75 M/UL (ref 4–5.2)
SODIUM BLD-SCNC: 139 MMOL/L (ref 135–144)
TOTAL PROTEIN: 5.9 G/DL (ref 6.4–8.3)
WBC # BLD: 3.9 K/UL (ref 3.5–11)

## 2022-06-07 PROCEDURE — 82947 ASSAY GLUCOSE BLOOD QUANT: CPT

## 2022-06-07 PROCEDURE — 99223 1ST HOSP IP/OBS HIGH 75: CPT | Performed by: FAMILY MEDICINE

## 2022-06-07 PROCEDURE — 6370000000 HC RX 637 (ALT 250 FOR IP): Performed by: STUDENT IN AN ORGANIZED HEALTH CARE EDUCATION/TRAINING PROGRAM

## 2022-06-07 PROCEDURE — 80053 COMPREHEN METABOLIC PANEL: CPT

## 2022-06-07 PROCEDURE — 93010 ELECTROCARDIOGRAM REPORT: CPT | Performed by: INTERNAL MEDICINE

## 2022-06-07 PROCEDURE — 2580000003 HC RX 258: Performed by: STUDENT IN AN ORGANIZED HEALTH CARE EDUCATION/TRAINING PROGRAM

## 2022-06-07 PROCEDURE — 2500000003 HC RX 250 WO HCPCS: Performed by: INTERNAL MEDICINE

## 2022-06-07 PROCEDURE — 90935 HEMODIALYSIS ONE EVALUATION: CPT

## 2022-06-07 PROCEDURE — 6370000000 HC RX 637 (ALT 250 FOR IP): Performed by: FAMILY MEDICINE

## 2022-06-07 PROCEDURE — 2060000000 HC ICU INTERMEDIATE R&B

## 2022-06-07 PROCEDURE — 5A1D70Z PERFORMANCE OF URINARY FILTRATION, INTERMITTENT, LESS THAN 6 HOURS PER DAY: ICD-10-PCS | Performed by: INTERNAL MEDICINE

## 2022-06-07 PROCEDURE — 6360000002 HC RX W HCPCS: Performed by: FAMILY MEDICINE

## 2022-06-07 PROCEDURE — 6360000002 HC RX W HCPCS: Performed by: STUDENT IN AN ORGANIZED HEALTH CARE EDUCATION/TRAINING PROGRAM

## 2022-06-07 PROCEDURE — 36415 COLL VENOUS BLD VENIPUNCTURE: CPT

## 2022-06-07 PROCEDURE — 85027 COMPLETE CBC AUTOMATED: CPT

## 2022-06-07 RX ORDER — NYSTATIN 100000 U/G
OINTMENT TOPICAL 2 TIMES DAILY
Status: DISCONTINUED | OUTPATIENT
Start: 2022-06-07 | End: 2022-06-12 | Stop reason: HOSPADM

## 2022-06-07 RX ADMIN — ACETAMINOPHEN 650 MG: 325 TABLET ORAL at 08:31

## 2022-06-07 RX ADMIN — ASPIRIN 81 MG: 81 TABLET, CHEWABLE ORAL at 08:31

## 2022-06-07 RX ADMIN — INSULIN LISPRO 6 UNITS: 100 INJECTION, SOLUTION INTRAVENOUS; SUBCUTANEOUS at 11:50

## 2022-06-07 RX ADMIN — CARVEDILOL 3.12 MG: 3.12 TABLET, FILM COATED ORAL at 20:52

## 2022-06-07 RX ADMIN — Medication 9 MG: at 20:52

## 2022-06-07 RX ADMIN — CIPROFLOXACIN 400 MG: 2 INJECTION, SOLUTION INTRAVENOUS at 20:48

## 2022-06-07 RX ADMIN — HEPARIN SODIUM 5000 UNITS: 5000 INJECTION INTRAVENOUS; SUBCUTANEOUS at 05:46

## 2022-06-07 RX ADMIN — SODIUM BICARBONATE 650 MG TABLET 650 MG: at 20:51

## 2022-06-07 RX ADMIN — TRAZODONE HYDROCHLORIDE 75 MG: 50 TABLET ORAL at 20:52

## 2022-06-07 RX ADMIN — CIPROFLOXACIN 400 MG: 2 INJECTION, SOLUTION INTRAVENOUS at 08:39

## 2022-06-07 RX ADMIN — SODIUM CHLORIDE, PRESERVATIVE FREE 10 ML: 5 INJECTION INTRAVENOUS at 08:38

## 2022-06-07 RX ADMIN — DOCUSATE SODIUM 100 MG: 100 CAPSULE, LIQUID FILLED ORAL at 08:32

## 2022-06-07 RX ADMIN — FEBUXOSTAT 40 MG: 40 TABLET, FILM COATED ORAL at 08:43

## 2022-06-07 RX ADMIN — RANOLAZINE 1000 MG: 500 TABLET, EXTENDED RELEASE ORAL at 08:30

## 2022-06-07 RX ADMIN — TAMSULOSIN HYDROCHLORIDE 0.4 MG: 0.4 CAPSULE ORAL at 08:32

## 2022-06-07 RX ADMIN — PANTOPRAZOLE SODIUM 40 MG: 40 TABLET, DELAYED RELEASE ORAL at 05:46

## 2022-06-07 RX ADMIN — SODIUM CHLORIDE, PRESERVATIVE FREE 10 ML: 5 INJECTION INTRAVENOUS at 21:06

## 2022-06-07 RX ADMIN — ACETAMINOPHEN 650 MG: 325 TABLET ORAL at 21:02

## 2022-06-07 RX ADMIN — INSULIN LISPRO 4 UNITS: 100 INJECTION, SOLUTION INTRAVENOUS; SUBCUTANEOUS at 21:03

## 2022-06-07 RX ADMIN — Medication 1.6 ML: at 16:12

## 2022-06-07 RX ADMIN — Medication 1.6 ML: at 16:11

## 2022-06-07 RX ADMIN — SODIUM BICARBONATE 650 MG TABLET 650 MG: at 08:30

## 2022-06-07 RX ADMIN — DOCUSATE SODIUM 100 MG: 100 CAPSULE, LIQUID FILLED ORAL at 20:52

## 2022-06-07 RX ADMIN — FUROSEMIDE 80 MG: 40 TABLET ORAL at 18:32

## 2022-06-07 RX ADMIN — RANOLAZINE 1000 MG: 500 TABLET, EXTENDED RELEASE ORAL at 20:52

## 2022-06-07 RX ADMIN — NYSTATIN OINTMENT: 100000 OINTMENT TOPICAL at 18:36

## 2022-06-07 RX ADMIN — BUSPIRONE HYDROCHLORIDE 10 MG: 10 TABLET ORAL at 20:52

## 2022-06-07 RX ADMIN — ATORVASTATIN CALCIUM 80 MG: 80 TABLET, FILM COATED ORAL at 08:31

## 2022-06-07 RX ADMIN — INSULIN GLARGINE 40 UNITS: 100 INJECTION, SOLUTION SUBCUTANEOUS at 08:34

## 2022-06-07 RX ADMIN — ONDANSETRON 4 MG: 4 TABLET, ORALLY DISINTEGRATING ORAL at 13:32

## 2022-06-07 RX ADMIN — HEPARIN SODIUM 5000 UNITS: 5000 INJECTION INTRAVENOUS; SUBCUTANEOUS at 22:04

## 2022-06-07 RX ADMIN — INSULIN LISPRO 4 UNITS: 100 INJECTION, SOLUTION INTRAVENOUS; SUBCUTANEOUS at 18:32

## 2022-06-07 RX ADMIN — BUSPIRONE HYDROCHLORIDE 10 MG: 10 TABLET ORAL at 08:31

## 2022-06-07 RX ADMIN — INSULIN LISPRO 2 UNITS: 100 INJECTION, SOLUTION INTRAVENOUS; SUBCUTANEOUS at 08:34

## 2022-06-07 ASSESSMENT — PAIN SCALES - GENERAL
PAINLEVEL_OUTOF10: 7
PAINLEVEL_OUTOF10: 4

## 2022-06-07 ASSESSMENT — PAIN DESCRIPTION - LOCATION: LOCATION: BACK

## 2022-06-07 ASSESSMENT — PAIN DESCRIPTION - DESCRIPTORS: DESCRIPTORS: BURNING

## 2022-06-07 NOTE — PLAN OF CARE
Problem: Discharge Planning  Goal: Discharge to home or other facility with appropriate resources  Outcome: Progressing     Problem: Safety - Adult  Goal: Free from fall injury  Outcome: Progressing     Problem: Nutrition Deficit:  Goal: Optimize nutritional status  Outcome: Progressing

## 2022-06-07 NOTE — PROGRESS NOTES
HEMODIALYSIS PRE-TREATMENT NOTE    Patient Identifiers prior to treatment: Name//MRN    Isolation Required:  Yes                      Isolation Type: Contact       (please document if patient is being managed as a PUI/COVID-19 patient)        Hepatitis status:                           Date Drawn                             Result  Hepatitis B Surface Antigen 2022     NEG                     Hepatitis B Surface Antibody 2022 POS     60   Hepatitis B Core Antibody N/A N/A          How was Hepatitis Status verified: Epic chart     Was a copy of the labs you documented provided to facility for the patient's chart: yes    Hemodialysis orders verified: yes    Access Within normal limits ( I.e. s/s of infection,...): yes     Pre-Assessment completed: yes by Valentina Waddell RN    Pre-dialysis report received from: Ike Mendiola                      Time: 12:00

## 2022-06-07 NOTE — H&P
Family Medicine Admit Note    PCP: AFASNEH Aragon CNP    Date of Admission: 6/6/2022    Date of Service: Pt seen/examined on 6/7/22 and Admitted to Inpatient. Chief Complaint:  Hyperglycemia      History Of Present Illness: The patient is a 57 y.o. female who presents to [facility] with complaints of hyperglycemia. She reports that she has had trouble controlling her blood sugars for the past several weeks. She reports taking her insulin as prescribed but the patient has known memory problems. She also complains of some associated body aches. She has end stage renal disease on chronic dialysis. She still makes some urine and reports that the last time she was in DKA it was due to a UTI. She denies any fevers, chills, chest pain, SOB, hematuria or blood in stools.     Past Medical History:        Diagnosis Date    Backache, unspecified     CHF (congestive heart failure) (HCC)     Chronic kidney disease     Coronary atherosclerosis of artery bypass graft     COVID 1/31/2022    Cramp of limb     Gallstones     Hypertension     Insomnia     Pneumonia     Type II or unspecified type diabetes mellitus with renal manifestations, not stated as uncontrolled(250.40)     Type II or unspecified type diabetes mellitus without mention of complication, not stated as uncontrolled     Unspecified vitamin D deficiency        Past Surgical History:        Procedure Laterality Date    ANKLE FRACTURE SURGERY      AV FISTULA CREATION  12/14/2021    BREAST SURGERY      CARPAL TUNNEL RELEASE      CHOLECYSTECTOMY, OPEN N/A     CORONARY ARTERY BYPASS GRAFT      x3    DIALYSIS FISTULA CREATION Left 12/14/2021    LEFT AV FISTULA CREATION UPPER EXTREMITY performed by Ami De Paz MD at [address] [facility] IR TUNNELED CATHETER PLACEMENT GREATER THAN 5 YEARS  8/18/2021    IR TUNNELED CATHETER PLACEMENT GREATER THAN 5 YEARS 8/18/2021 [facility] SPECIAL PROCEDURES    KNEE ARTHROSCOPY right    OTHER SURGICAL HISTORY  04/06/2022    cvc exchange    TONSILLECTOMY         Medications Prior to Admission:    Prior to Admission medications    Medication Sig Start Date End Date Taking? Authorizing Provider   insulin glargine (BASAGLAR KWIKPEN) 100 UNIT/ML injection pen Inject 60 Units into the skin 2 times daily  Patient taking differently: Inject 60 Units into the skin 2 times daily Indications: 40units once daily  4/20/22   Ivana Barrera MD   miconazole (MICOTIN) 2 % powder Apply topically 2 times daily. 4/20/22   Ivana Barrera MD   melatonin 10 MG CAPS capsule Take 10 mg by mouth nightly    Historical Provider, MD   lidocaine-prilocaine (EMLA) 2.5-2.5 % cream Apply topically as needed for Pain Indications: Apply to dialysis site Apply topically as needed.   Patient not taking: Reported on 6/6/2022    Historical Provider, MD   carvedilol (COREG) 3.125 MG tablet Take 3.125 mg by mouth 2 times daily    Historical Provider, MD   traZODone (DESYREL) 50 MG tablet TAKE 1 & 1/2 (ONE & ONE-HALF) TABLETS BY MOUTH NIGHTLY 4/11/22   AFSANEH Lucas CNP   acetaminophen (TYLENOL) 325 MG tablet Take 650 mg by mouth every 6 hours as needed for Pain    Historical Provider, MD   furosemide (LASIX) 80 MG tablet Take 1 tablet by mouth 2 times daily 3/8/22   AFSANEH Lucas CNP   sodium bicarbonate 650 MG tablet Take 1 tablet by mouth 3 times daily 3/2/22   Herrick Campus, MD   Insulin Pen Needle (EASY TOUCH PEN NEEDLES) 29G X 12MM MISC 1 each by Does not apply route daily 3/1/22   AFSANEH Lucas CNP   ReliOn Lancets Micro-Thin 33G MISC USE AS DIRECTED EVERY DAY 1/28/22   Historical Provider, MD   HUMALOG 100 UNIT/ML injection vial Inject into the skin 3 times daily (before meals) Indications: 15 units and sliding scale (patient reports only the sliding scale)  11/29/21   Historical Provider, MD   Blood Glucose Monitoring Suppl (TRUE METRIX GO GLUCOSE METER) w/Device KIT 1 each by Does not apply route 4 times daily 11/30/21   AFSANEH Zhou CNP   Lancets MISC 1 each by Does not apply route daily 11/30/21   AFSANEH Zhou CNP   febuxostat (ULORIC) 40 MG TABS tablet Take 1 tablet by mouth daily 11/22/21   Lita Correia MD   docusate sodium (COLACE) 100 MG capsule Take 1 capsule by mouth 2 times daily 11/22/21   Lita Correia MD   tamsulosin [facility]) 0.4 MG capsule Take 1 capsule by mouth daily 11/23/21   Lita Correia MD   atorvastatin (LIPITOR) 80 MG tablet Take 1 tablet by mouth daily 11/3/21   AFSANEH Zhou CNP   aspirin 81 MG chewable tablet Take 1 tablet by mouth daily 9/25/21   Genevieve Betts MD   ranolazine (RANEXA) 1000 MG extended release tablet Take 1 tablet by mouth 2 times daily 9/25/21   Genevieve Betts MD   busPIRone (BUSPAR) 7.5 MG tablet Take 1 tablet by mouth 3 times daily  Patient taking differently: Take 10 mg by mouth 3 times daily  9/25/21   Genevieve Betts MD   pantoprazole (PROTONIX) 40 MG tablet Take 1 tablet by mouth every morning (before breakfast) 9/25/21   Genevieve Betts MD   allopurinol (ZYLOPRIM) 100 MG tablet Take 1 tablet by mouth daily 4/14/21   AFSANEH Zhou CNP       Allergies:  Adhesive tape, Ace inhibitors, Iv dye [iodides], Nsaids, and Metformin and related    Social History:  The patient currently lives at home    TOBACCO:   reports that she has never smoked. She has never used smokeless tobacco.  ETOH:   reports no history of alcohol use.     Review of Systems - General ROS: positive for  - obesity  Psychological ROS: positive for - anxiety  Hematological and Lymphatic ROS: positive for - pallor  Endocrine ROS: positive for - hyperglycemia  Musculoskeletal ROS: positive for - muscle pain  Neurological ROS: positive for - impaired coordination/balance and numbness/tingling      Family History:          Problem Relation Age of Onset    Diabetes Father     Heart Failure Father        PHYSICAL EXAM:    BP (!) 127/52   Pulse 68 Temp 98.1 °F (36.7 °C) (Oral)   Resp 18   Ht 5' 5\" (1.651 m)   Wt 237 lb 10.5 oz (107.8 kg)   SpO2 97%   BMI 39.55 kg/m²     General appearance: No apparent distress appears stated age and cooperative. HEENT Normal cephalic, atraumatic without obvious deformity. Pupils equal, round, and reactive to light. Extra ocular muscles intact. Conjunctivae/corneas clear. Neck: Supple, No jugular venous distention/bruits. Trachea midline without thyromegaly or adenopathy with full range of motion. Lungs: Clear to auscultation, bilaterally without Rales/Wheezes/Rhonchi with good respiratory effort. Heart: Regular rate and rhythm with Normal S1/S2 without murmurs, rubs or gallops, point of maximum impulse non-displaced  Abdomen: Soft, non-tender or non-distended without rigidity or guarding and positive bowel sounds all four quadrants. Extremities: No clubbing, cyanosis, or edema bilaterally. Full range of motion without deformity and normal gait intact. Skin: Skin color, texture, turgor normal.  No rashes or lesions. Neurologic: Alert and oriented X 3, neurovascularly intact with sensory/motor intact upper extremities/lower extremities, bilaterally. Cranial nerves: II-XII intact, grossly non-focal.  Mental status: Alert, oriented, thought content appropriate. CXR:  I have reviewed the CXR with the following interpretation: not done  EKG:  I have reviewed the EKG with the following interpretation: sinus rhythm with PACs    CBC   Recent Labs     06/06/22  1551 06/07/22  0510   WBC 3.1* 3.9   HGB 8.7* 8.6*   HCT 26.3* 26.5*   * 151      RENAL  Recent Labs     06/06/22  1551 06/07/22  0510    139   K 3.1* 3.6*    103   CO2 28 25   BUN 23 21   CREATININE 2.44* 2.28*     LFT'S  Recent Labs     06/07/22  0510   AST 30   ALT 13   BILITOT 0.68   ALKPHOS 83     COAG  No results for input(s): INR in the last 72 hours.   CARDIAC ENZYMES  No results for input(s): CKTOTAL, CKMB, CKMBINDEX, TROPONINI in the last 72 hours. U/A:    Lab Results   Component Value Date    COLORU Yellow 06/06/2022    WBCUA 21 TO 50 06/06/2022    RBCUA 6 TO 9 06/06/2022    MUCUS 1+ 06/06/2022    BACTERIA MANY 06/06/2022    SPECGRAV 1.017 06/06/2022    LEUKOCYTESUR MOD 06/06/2022    GLUCOSEU LARGE 06/06/2022    AMORPHOUS 1+ 06/06/2022       ABG    Lab Results   Component Value Date    LRI7IGN 30.4 04/24/2022    Y4RONWKP 82.8 04/24/2022    PHART 7.429 04/24/2022    MNL3UFH 45.9 04/24/2022    PO2ART 47.6 04/24/2022           Active Hospital Problems    Diagnosis Date Noted    Hyperglycemia [R73.9] 06/06/2022     Priority: Medium    [facility] [N18.6] 02/14/2022    Anxiety [F41.9] 09/30/2021    Disequilibrium syndrome [E87.8] 09/17/2021    Ischemic stroke of frontal lobe ([facility] [I63.9] 08/11/2021    Essential hypertension [I10] 05/03/2021    Obesity, Class II, BMI 35-39.9 [E66.9] 04/07/2021    Iron deficiency anemia [D50.9] 08/31/2020    Diabetic polyneuropathy associated with type 2 diabetes mellitus ([facility] [E11.42] 02/17/2020    Chronic diastolic heart failure ([facility] [I50.32] 09/20/2018    Type 2 diabetes 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, with long-term current use of insulin ([facility] [E11.22, N18.6, Z99.2, Z79.4] 09/20/2018    Spinal stenosis of lumbar region with neurogenic claudication [M48.062] 12/27/2016    Mixed hyperlipidemia [E78.2] 07/27/2016         ASSESSMENT/PLAN:  Patient admitted with hyperglycemia and not in DKA. Co-morbidities as above. -Uncontrolled DM2 - on Lantus, SS coverage & carb control diet. -ESRD on dialysis - Nephrology consulted. -Home medications reviewed & reconciled.  -Complete orders per chart. DVT Prophylaxis:   Diet: ADULT DIET; Regular; 3 carb choices (45 gm/meal);  Low Sodium (2 gm)  Code Status: Full Code      Dispo - admitted to [facility]       @[facility]@

## 2022-06-07 NOTE — PROGRESS NOTES
Medication History completed    New medications:  None    Medications discontinued:  Gabapentin 300 mg BID (patient not taking, last confirmed fill date 11/22/21 from [facility])    Changes to dosing:  Buspirone 10 mg TID  Insulin 40 units (patient taking 40 units, but med history says 60 units BID)    Stated allergies:  Adhesive tape (rash)  ACE inhibitors (cough, states not good for kidneys)  IV dyes/iodine (stage 4 kidney disease)  NSAIDs (cannot take due to kidney disease)  Metformin (hives, itching, rash, stage 4 kidney disease)    Other pertinent information:  Patient states taking 40 units of insulin glargine daily, however med history states she should be taking 60 units BID. Patient states taking trazodone 75 mg (one and one half tabs), however pharmacy says they fill for 150 mg QD. Patient requires counseling on medication changes to avoid confusion in the future. Patient states taking a blood thinner, however pharmacy did not have a fill history of this. Denies smoking, cannabis, and street drugs. [facility] Patient previously filled with [facility], but was closed at this time. Cassius Finney PharmD Candidate 2023    I personally observed and participated in the completion of this medication reconciliation.   Thank you,  Andreas Castro PharmD, [facility]  PGY-1 Pharmacy Resident

## 2022-06-07 NOTE — CARE COORDINATION
CASE MANAGEMENT NOTE:    Admission Date:  6/6/2022 Peterson Marte is a 57 y.o.  female    Admitted for : Hypokalemia [E87.6]  Hyperglycemia [R73.9]    Met with:  Patient    PCP:  Ansel Saint, APRN                                Insurance:  Adwoa Rodgers      Is patient alert and oriented at time of discussion:  Yes    Current Residence/ Living Arrangements:  independently at home             Current Services PTA:  Yes    Does patient go to outpatient dialysis: No  If yes, location and chair time:     Is patient agreeable to VNS: Yes    Freedom of choice provided:  Yes    List of [address] provided: No    VNS chosen:  Current with Ana    DME:  SUJEY Bess, SC, Glucometer    Home Oxygen: Yes    Nebulizer: No    CPAP/BIPAP: No    Supplier: unsure    Potential Assistance Needed: Yes    SNF needed: No    Freedom of choice and list provided: NA    Pharmacy:  [facility]       Is patient currently receiving oral anticoagulation therapy? Yes    Is the Patient an [facility] with Readmission Risk Score greater than 14%? No  If yes, pt needs a follow up appointment made within 7 days. Family Members/Caregivers that pt would like involved in their care:    No    If yes, list name here:      Transportation Provider:  Patient and Family             Discharge Plan:  06/07. [place]. Pt from home, mom lives with her. Family and pt provide transport. DME: walker, WC, SC, Glucometer. Wears 02 @ 3L NC 24/7 portable and concentrator unsure of supplier. Receives HS @ [facility].SA @ . [facility] PTA. Current with [facility] Ana, referral sent. [facility] Living comes to home 5x a weeks to assist.    Basilio parker.  ANTHONY WILL NEED SIGNED/COMPLETED. //SS             Electronically signed by: Vishal Alvarado RN on 6/7/2022 at 9:38 AM

## 2022-06-07 NOTE — FLOWSHEET NOTE
Writer responded to consult for Communion   Patient is very grateful for the spiritual support she receives here     06/07/22 1158   Encounter Summary   Encounter Overview/Reason  Spiritual/Emotional Needs   Service Provided For: Patient   Referral/Consult From: Patient   Last Encounter  06/07/22   Complexity of Encounter Moderate   Begin Time 1200   End Time  1220   Total Time Calculated 20 min   Spiritual/Emotional needs   Type Spiritual Support   Rituals, Rites and Sacraments   Type [facility] Communion   Grief, Loss, and Adjustments   Type Adjustment to illness   Assessment/Intervention/Outcome   Assessment Coping; Hopeful;Fearful   Intervention Discussed illness injury and its impact; Discussed relationship with God;Discussed meaning/purpose; Active listening;Prayer (assurance of)/[facility]   Outcome Engaged in conversation;Comfort;Expressed Gratitude;Peace   Plan and Referrals   Plan/Referrals Coordinate Rites and/or Rituals;Placed on [employer]

## 2022-06-07 NOTE — DISCHARGE INSTR - COC
Continuity of Care Form    Patient Name: Rozina Rogers   :  1960  MRN:  [id number]    Admit date:  2022  Discharge date:      Code Status Order: Full Code   Advance Directives:      Admitting Physician:  Robert Mas MD  PCP: Josselyn Sanchez, AFSANEH - CNP    Discharging Nurse:  [facility] Unit/Room#: 2106/2106-01  Discharging Unit Phone Number: [phone]    Emergency Contact:   Extended Emergency Contact Information  Primary Emergency Contact:  [address], [address] Phone: [phone]  Relation: Brother/Sister  Secondary Emergency Contact: Tess Zeng, [address] Phone: [phone]  Relation: Brother/Sister    Past Surgical History:  Past Surgical History:   Procedure Laterality Date    ANKLE FRACTURE SURGERY      AV FISTULA CREATION  2021    BREAST SURGERY      CARPAL TUNNEL RELEASE      CHOLECYSTECTOMY, OPEN N/A     CORONARY ARTERY BYPASS GRAFT      x3    DIALYSIS FISTULA CREATION Left 2021    LEFT AV FISTULA CREATION UPPER EXTREMITY performed by Aydee Cuevas MD at [facility]      IR TUNNELED [address] 5 YEARS  2021    IR TUNNELED CATHETER PLACEMENT GREATER THAN 5 YEARS 2021 [facility] SPECIAL PROCEDURES    KNEE ARTHROSCOPY      right    OTHER SURGICAL HISTORY  2022    cvc exchange    TONSILLECTOMY         Immunization History:   Immunization History   Administered Date(s) Administered    COVID-19, Pfizer Purple top, DILUTE for use, 12+ yrs, 30mcg/0.3mL dose 2021, 2021    Influenza Virus Vaccine 10/18/2018, 09/10/2019, 2020    Influenza, MDCK Quadv, IM, PF (Flucelvax 2 yrs and older) 2021    Influenza, Quadv, IM, (6 mo and older Fluzone, Flulaval, Fluarix and 3 yrs and older Afluria) 10/16/2017    Influenza, Quadv, IM, PF (6 mo and older Fluzone, Flulaval, Fluarix, and 3 yrs and older Afluria) 10/18/2018, 09/10/2019, 2019, 2020    Pneumococcal Conjugate 13-valent (Naalehu Butts) 08/06/2019    Pneumococcal Polysaccharide ([id number]) 10/30/2014    Tdap (Boostrix, Adacel) 11/18/2017       Active Problems:  Patient Active Problem List   Diagnosis Code    Atherosclerosis of coronary artery bypass graft of native heart without angina pectoris I25.810    Chronic diastolic heart failure ([facility]) I50.32    Diabetic polyneuropathy associated with type 2 diabetes mellitus ([facility]) E11.42    History of coronary artery bypass graft Z95.1    Iron deficiency anemia D50.9    Spinal stenosis of lumbar region with neurogenic claudication M48.062    Mixed hyperlipidemia E78.2    Type 2 diabetes 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, with long-term current use of insulin ([facility]) E11.22, N18.6, Z99.2, Z79.4    Obesity, Class II, BMI 35-39.9 E66.9    Thyroid nodule greater than or equal to 1 cm in diameter incidentally noted on imaging study E04.1    Essential hypertension I10    Chronic ischemic heart disease I25.9    Ischemic stroke of frontal lobe ([facility]) I63.9    Morbid obesity with BMI of 40.0-44.9, adult ([facility]) E66.01, Z68.41    Disequilibrium syndrome E87.8    Generalized weakness R53.1    Long-term memory loss R41.3    Muscle right arm weakness M62.81    Anxiety F41.9    Chronic midline low back pain with bilateral sciatica M54.41, M54.42, G89.29    Tremor R25.1    COVID U07.1    End-stage renal disease ([facility] N18.6    Diabetic ketoacidosis without coma associated with type 2 diabetes mellitus ([facility] E11.10    Hypokalemia E87.6    Confusion R41.0    Myoclonic jerking G25.3    Hyperglycemia R73.9       Isolation/Infection:   Isolation            Contact          Patient Infection Status       Infection Onset Added Last Indicated Last Indicated By Review Planned Expiration Resolved Resolved By    VRE 05/09/22 05/13/22 05/09/22 Culture, Urine        MRSA 08/03/20 08/13/21 08/13/21 Aurora Sierra RN        Added from external infection.     Resolved    COVID-19 (Rule Out) 06/06/22 06/06/22 22 COVID-19 & Influenza Combo (Ordered)   22 Rule-Out Test Resulted    COVID-19 (Rule Out) 22 COVID-19 & Influenza Combo (Ordered)   04/15/22 Lei Jones RN    COVID-19 (Rule Out) 22 COVID-19 & Influenza Combo (Ordered)   22 Rule-Out Test Resulted    COVID-19 (Rule Out) 22 COVID-19 (Ordered)   22 Rule-Out Test Resulted    COVID-19 22 COVID-19   22     COVID-19 (Rule Out) 22 COVID-19 (Ordered)   22 Rule-Out Test Resulted            Nurse Assessment:  Last Vital Signs: /65   Pulse 92   Temp 98.8 °F (37.1 °C) (Oral)   Resp 16   Ht 5' 5\" (1.651 m)   Wt 237 lb 10.5 oz (107.8 kg)   SpO2 90%   BMI 39.55 kg/m²     Last documented pain score (0-10 scale): Pain Level: 4  Last Weight:   Wt Readings from Last 1 Encounters:   22 237 lb 10.5 oz (107.8 kg)     Mental Status:  oriented, alert, logical, and thought processes intact    IV Access:  - None    Nursing Mobility/ADLs:  Walking   Assisted  Transfer  Assisted  Bathing  Assisted  Dressing  Assisted  Toileting  Assisted  Feeding  Independent  Med Admin  Independent  Med Delivery   whole    Wound Care Documentation and Therapy:        Elimination:  Continence: Bowel: Yes  Bladder: Yes  Urinary Catheter: None   Colostomy/Ileostomy/Ileal Conduit: No       Date of Last BM: 6/10    Intake/Output Summary (Last 24 hours) at 2022 0943  Last data filed at 2022 0817  Gross per 24 hour   Intake 770 ml   Output --   Net 770 ml     No intake/output data recorded. Safety Concerns:      At Risk for Falls    Impairments/Disabilities:      None    Nutrition Therapy:  Current Nutrition Therapy:   - Oral Diet:  Carb Control 4 carbs/meal (1800kcals/day)    Routes of Feeding: Oral  Liquids: No Restrictions  Daily Fluid Restriction: no  Last Modified Barium Swallow with Video (Video Swallowing Test):

## 2022-06-07 NOTE — FLOWSHEET NOTE
06/07/22 1050   Encounter Summary   Encounter Overview/Reason   Encounter   Service Provided For: Patient   Referral/Consult From: Nhi   Last Encounter  06/07/22   Complexity of Encounter Low   Begin Time 1000   End Time  1015   Total Time Calculated 15 min   Spiritual/Emotional needs   Type Spiritual Support   Rituals, Rites and Sacraments   Type Anointing  (Fr Erickson 6/7/22)

## 2022-06-07 NOTE — FLOWSHEET NOTE
06/07/22 1600   Vital Signs   BP (!) 128/49   Temp 97.8 °F (36.6 °C)   Heart Rate 77   Resp 18   Weight 231 lb 0.7 oz (104.8 kg)   Weight Method Bed scale   Percent Weight Change -2.78   Post-Hemodialysis Assessment   Post-Treatment Procedures Blood returned;Catheter capped, clamped with Citrate x 2 ports   Machine Disinfection Process Acid/Vinegar Clean;Heat Disinfect; Exterior Machine Disinfection   Rinseback Volume (ml) 250 ml   Total Liters Processed (l/min) 64.1 l/min   Dialyzer Clearance Lightly streaked   Duration of Treatment (minutes) 210 minutes   Hemodialysis Intake (ml) 500 ml   Hemodialysis Output (ml) 2500 ml   NET Removed (ml) 2000   Bilateral Breath Sounds Diminished   Edema Generalized   3.5 hour tx completed, removed 2liter without difficulties, dialysis lines clamped and secure, end caps secure, dressing dry and intact, report given to primary rn

## 2022-06-07 NOTE — PROGRESS NOTES
Comprehensive Nutrition Assessment    Type and Reason for Visit:  Initial,Positive Nutrition Screen (wt loss, poor appetite)    Nutrition Recommendations/Plan:   1. Will provide 4 carbohydrate choices per tray with 2 g Na restriction and Glucerna supplements twice daily. Malnutrition Assessment:  Malnutrition Status: At risk for malnutrition (Comment) (06/07/22 1510)    Context:  Acute Illness     Findings of the 6 clinical characteristics of malnutrition:  Energy Intake:  Mild decrease in energy intake (Comment)  Weight Loss:  7.5% over 3 months     Body Fat Loss:  No significant body fat loss     Muscle Mass Loss:  No significant muscle mass loss    Fluid Accumulation:  No significant fluid accumulation     Strength:  Not Performed    Nutrition Assessment:    Pt admitted due to hypokalemia and Hyperglycemia. She states she drinks Glucerna with meals. Review of wt history shows 30#  unintentional wt loss since April. Nutrition Related Findings:    no edema, labs: K 3.6, Glu 171, Meds: K+, PMH: ESRD or HD, DM, CHF Wound Type: None       Current Nutrition Intake & Therapies:    Average Meal Intake: 51-75%  Average Supplements Intake: %  ADULT DIET; Regular; 4 carb choices (60 gm/meal); Low Sodium (2 gm)    Anthropometric Measures:  Height: 5' 5\" (165.1 cm)  Ideal Body Weight (IBW): 125 lbs (57 kg)    Admission Body Weight: 237 lb (107.5 kg)  Current Body Weight: 237 lb (107.5 kg),   IBW.  Weight Source: Bed Scale  Current BMI (kg/m2): 39.4  Usual Body Weight: 267 lb (121.1 kg) (4/22)  % Weight Change (Calculated): -11.2                    BMI Categories: Obese Class 2 (BMI 35.0 -39.9)    Estimated Daily Nutrient Needs:  Energy Requirements Based On: Formula  Weight Used for Energy Requirements: Admission  Energy (kcal/day): Brooks x 1.1= 1800 kcal  Weight Used for Protein Requirements: Admission  Protein (g/day): 1.5g/kg= 160 g       Nutrition Diagnosis:   · Predicted inadequate energy intake related to  (current medical condition) as evidenced by poor intake prior to admission    Nutrition Interventions:   Food and/or Nutrient Delivery: Continue Current Diet,Start Oral Nutrition Supplement  Nutrition Education/Counseling: No recommendation at this time  Coordination of Nutrition Care: Continue to monitor while inpatient       Goals:     Goals: PO intake 50% or greater       Nutrition Monitoring and Evaluation:      Food/Nutrient Intake Outcomes: Food and Nutrient Intake,Supplement Intake  Physical Signs/Symptoms Outcomes: Biochemical Data,GI Status,Fluid Status or Edema,Skin,Weight    Discharge Planning:    Continue current diet     Jersey Vance, [address] [place] [address],   Contact: [phone]

## 2022-06-07 NOTE — CONSULTS
NEPHROLOGY CONSULT     Patient :  Qasim Mathews; 57 y.o. MRN# [id number]  Location:  2106/2106-01  Attending: Saúl Anderson MD  Admit Date:  6/6/2022   Hospital Day: 1      Reason for Consult: ESRD/hemodialysis      Chief Complaint: Uncontrolled diabetes  History Obtained From: Patient    History of Present Illness: This is a 57 y.o. female with hypertension, type 2 diabetes mellitus, insulin-dependent diabetes mellitus, end-stage renal disease on hemodialysis Tuesday Thursday Saturday at [facility].   Patient presented to the hospital with complaints of uncontrolled high sugars her sugars were above 400, patient was not found in DKA, anion gap was 11  Patient denied any other complaint no nausea vomiting no diarrhea no chest pain or shortness of breath  Patient had dialysis on Saturday      Past Medical History:        Diagnosis Date    Backache, unspecified     [facility]F [facility]congestive heart failure) ([facility] [address]     Chronic kidney disease     Coronary atherosclerosis of artery bypass graft     COVID 1/31/2022    Cramp of limb     Gallstones     Hypertension     Insomnia     Pneumonia     Type II or unspecified type diabetes mellitus with renal manifestations, not stated as uncontrolled(250.40)     Type II or unspecified type diabetes mellitus without mention of complication, not stated as uncontrolled     Unspecified vitamin D deficiency        Past Surgical History:        Procedure Laterality Date    ANKLE FRACTURE SURGERY      AV FISTULA CREATION  12/14/2021    BREAST SURGERY      CARPAL TUNNEL RELEASE      CHOLECYSTECTOMY, OPEN N/A     CORONARY ARTERY BYPASS GRAFT      x3    DIALYSIS FISTULA CREATION Left 12/14/2021    LEFT AV FISTULA CREATION UPPER EXTREMITY performed by Angelica Negron MD at [address] [facility] IR TUNNELED CATHETER PLACEMENT GREATER THAN 5 YEARS  8/18/2021    IR TUNNELED CATHETER PLACEMENT GREATER THAN 5 YEARS 8/18/2021 [facility] SPECIAL PROCEDURES    KNEE ARTHROSCOPY      right    OTHER SURGICAL HISTORY  04/06/2022    cvc exchange    TONSILLECTOMY         Current Medications:    magnesium sulfate 2000 mg in water 50 mL IVPB, Once  sodium chloride flush 0.9 % injection 5-40 mL, 2 times per day  sodium chloride flush 0.9 % injection 5-40 mL, PRN  0.9 % sodium chloride infusion, PRN  acetaminophen (TYLENOL) tablet 650 mg, Q4H PRN  ondansetron (ZOFRAN-ODT) disintegrating tablet 4 mg, Q8H PRN   Or  ondansetron (ZOFRAN) injection 4 mg, Q6H PRN  heparin (porcine) injection 5,000 Units, 3 times per day  cefTRIAXone (ROCEPHIN) 1000 mg IVPB in NS 50ml minibag, Once  aspirin chewable tablet 81 mg, Daily  ranolazine (RANEXA) extended release tablet 1,000 mg, BID  busPIRone (BUSPAR) tablet 10 mg, TID  pantoprazole (PROTONIX) tablet 40 mg, QAM AC  atorvastatin (LIPITOR) tablet 80 mg, Daily  febuxostat (ULORIC) tablet 40 mg, Daily  docusate sodium (COLACE) capsule 100 mg, BID  tamsulosin (FLOMAX) capsule 0.4 mg, Daily  sodium bicarbonate tablet 650 mg, TID  furosemide (LASIX) tablet 80 mg, BID  traZODone (DESYREL) tablet 75 mg, Nightly  melatonin tablet 9 mg, Nightly  lidocaine-prilocaine (EMLA) cream, PRN  carvedilol (COREG) tablet 3.125 mg, BID  insulin glargine (LANTUS) injection vial 40 Units, QAM  miconazole (MICOTIN) 2 % powder, BID  insulin lispro (HUMALOG) injection vial 0-12 Units, TID WC  insulin lispro (HUMALOG) injection vial 0-6 Units, Nightly  glucose chewable tablet 16 g, PRN  dextrose bolus 10% 125 mL, PRN   Or  dextrose bolus 10% 250 mL, PRN  glucagon (rDNA) injection 1 mg, PRN  dextrose 5 % solution, PRN  ciprofloxacin (CIPRO) IVPB 400 mg, Q12H        Allergies:  Adhesive tape, Ace inhibitors, Iv dye [iodides], Nsaids, and Metformin and related    Social History:   Social History     Socioeconomic History    Marital status: Single     Spouse name: Not on file    Number of children: Not on file    Years of education: Not on file    Highest education level: Not on file   Occupational History    Not on file   Tobacco Use    Smoking status: Never Smoker    Smokeless tobacco: Never Used   Vaping Use    Vaping Use: Never used   Substance and Sexual Activity    Alcohol use: No    Drug use: No    Sexual activity: Not Currently   Other Topics Concern    Not on file   Social History Narrative    Not on file     Social Determinants of Health     Financial Resource Strain: Low Risk     Difficulty of Paying Living Expenses: Not very hard   Food Insecurity: No Food Insecurity    Worried About Running Out of Food in the Last Year: Never true    Nany of Food in the Last Year: Never true   Transportation Needs: No Transportation Needs    Lack of Transportation (Medical): No    Lack of Transportation (Non-Medical): No   Physical Activity: Inactive    Days of Exercise per Week: 0 days    Minutes of Exercise per Session: 0 min   Stress: Stress Concern Present    Feeling of Stress :  To some extent   Social Connections: Socially Isolated    Frequency of Communication with Friends and Family: More than three times a week    Frequency of Social Gatherings with Friends and Family: More than three times a week    Attends [employer]ist Services: Never    Active Member of Clubs or Organizations: No    Attends Club or Organization Meetings: Never    Marital Status: Never    Intimate Partner Violence:     Fear of Current or Ex-Partner: Not on file    Emotionally Abused: Not on file    Physically Abused: Not on file    Sexually Abused: Not on file   Housing Stability: [address] Unable to Pay for Housing in the Last Year: No    Number of [place] in the Last Year: 1    Unstable Housing in the Last Year: No       Family History:   Family History   Problem Relation Age of Onset    Diabetes Father     Heart Failure Father        Review of Systems:    Constitutional: No fever, no chills, no night sweats, fatigue, generalized weakness, loss of appetite  HEENT:  No headache, otalgia, itchy eyes, epistaxis, nasal discharge or sore throat. Cardiac:  No chest pain, dyspnea, orthopnea or PND, palpitations  Chest:  No cough, hemoptysis, pleuritic chest pain, wheezing,SOB  Abdomen:  No abdominal pain, nausea, vomiting, diarrhea, melena, dysphagia hematemesis,constipation, abdominal bloating, flank pain  Neuro:  No CVA, TIA or seizure like activity. Skin:   No rashes, no itching. :   No hematuria, no pyuria, no dysuria, no flank pain. Extremities:  No swelling or joint pains. Objective:  CURRENT TEMPERATURE:  Temp: 98.6 °F (37 °C)  MAXIMUM TEMPERATURE OVER 24HRS:  Temp (24hrs), Av.4 °F (36.9 °C), Min:98.1 °F (36.7 °C), Max:98.8 °F (37.1 °C)    CURRENT RESPIRATORY RATE:  Resp: 18  CURRENT PULSE:  Heart Rate: 65  CURRENT BLOOD PRESSURE:  BP: (!) 116/33  24HR BLOOD PRESSURE RANGE:  Systolic (49HMQ), EBS:340 , Min:112 , KR   ; Diastolic (77FMU), VJO:88, Min:33, Max:71    24HR INTAKE/OUTPUT:      Intake/Output Summary (Last 24 hours) at 2022 1614  Last data filed at 2022 1313  Gross per 24 hour   Intake 1010 ml   Output 500 ml   Net 510 ml     Patient Vitals for the past 96 hrs (Last 3 readings):   Weight   22 1216 237 lb 10.5 oz (107.8 kg)   22 0600 237 lb 10.5 oz (107.8 kg)   22 1424 272 lb (123.4 kg)       Physical Exam:  GENERAL APPEARANCE: Alert and cooperative, and appears to be in no acute distress. HEAD: normocephalic  EYES: EOMI. Not pale, anicteric   NOSE:  No nasal discharge. THROAT:  Oral cavity and pharynx normal. Moist  CARDIAC:  Rhythm is regular. LUNGS: Normal respiratory effort no accessory muscle use  NECK: Neck supple, non-tender   GI-soft nontender  MUSKULOSKELETAL: Adequately aligned spine. No joint erythema or tenderness. EXTREMITIES: No edema. Peripheral pulses intact.    NEURO: Nonfocal    Labs:   CBC:  Recent Labs     22  1551 22  0510   WBC 3.1* 3.9   RBC 2.81* 2.75*   HGB 8.7* 8.6*   HCT 26.3* 26.5*   MCV 93.9 96.3   MCH 31.1 31.1   MCHC 33.2 32.3   RDW 17.7* 17.8*   * 151   MPV 8.2 8.5      BMP:   Recent Labs     06/06/22  1551 06/07/22  0510    139   K 3.1* 3.6*    103   CO2 28 25   BUN 23 21   CREATININE 2.44* 2.28*   GLUCOSE 460* 171*   CALCIUM 8.9 8.5*      Phosphorus:  No results for input(s): PHOS in the last 72 hours. Magnesium: No results for input(s): MG in the last 72 hours. Albumin:   Recent Labs     06/07/22  0510   LABALBU 2.7*       IRON:  No results for input(s): IRON in the last 72 hours. Iron Saturation:  Invalid input(s): PERCENTFE  TIBC:  No results for input(s): TIBC in the last 72 hours. FERRITIN:  No results for input(s): FERRITIN in the last 72 hours. SPEP: No results for input(s): SPEP in the last 72 hours. Recent Labs     06/07/22  0510   PROT 5.9*     UPEP: No results for input(s): TPU in the last 72 hours. Urine Sodium:  No results for input(s): JASON in the last 72 hours. Urine Potassium: No results for input(s): KUR in the last 72 hours. Urine Chloride:  No results for input(s): CLU in the last 72 hours. Urine Ph:  Invalid input(s): PO4U  Urine Osmolarity: No results for input(s): OSMOU in the last 72 hours. Urine Creatinine:  No results for input(s): LABCREA in the last 72 hours. Urine Eosinophils: Invalid input(s): EOSU  Urine Protein:  No results for input(s): TPU in the last 72 hours. Urinalysis:    Recent Labs     06/06/22  1705   NITRU NEGATIVE   COLORU Yellow   PHUR 6.5   WBCUA 21 TO 50   RBCUA 6 TO 9   MUCUS 1+*   BACTERIA MANY*   SPECGRAV 1.017   LEUKOCYTESUR MOD*   UROBILINOGEN Normal   [address] [facility] NEGATIVE   AMORPHOUS 1+*         Radiology:  Reviewed as available. Assessment:    1.  End-stage renal disease - on hemodialysis by way of a right-sided tunneled catheter  We will maintain TTS hemodialysis schedule.  Patient will receive acute hemodialysis tomorrow  Renal diet,i.e 2-gram sodium,2-gram potassium,1500 ml fluid restriction,1-gram phosphorus, 1800 KCal and 1.2 gram protein per day.     2.  Systemic hypertension - controlled    3. Type 2 diabetes insulin-dependent diabetes mellitus uncontrolled blood sugars on insulin- diabetes management per primary team     3.  Normocytic anemia of chronic kidney disease - Hemoglobin  stable, continue with Retacrit 3003 times a week with dialysis     4. Mineral and bone disease profile -controlled       Plan:  Continue hemodialysis Tuesday Thursday Saturday Next dialysis tomorrow  Diabetes management per primary team  Discharge planning in progress    Thank you for the consultation.       Electronically signed by Ziggy Ojeda MD on 6/7/2022 at 4:14 PM

## 2022-06-08 LAB
ALBUMIN SERPL-MCNC: 3.1 G/DL (ref 3.5–5.2)
ALP BLD-CCNC: 99 U/L (ref 35–104)
ALT SERPL-CCNC: 13 U/L (ref 5–33)
ANION GAP SERPL CALCULATED.3IONS-SCNC: 8 MMOL/L (ref 9–17)
AST SERPL-CCNC: 21 U/L
BILIRUB SERPL-MCNC: 0.7 MG/DL (ref 0.3–1.2)
BUN BLDV-MCNC: 15 MG/DL (ref 8–23)
CALCIUM SERPL-MCNC: 8.5 MG/DL (ref 8.6–10.4)
CHLORIDE BLD-SCNC: 98 MMOL/L (ref 98–107)
CO2: 28 MMOL/L (ref 20–31)
CREAT SERPL-MCNC: 2.04 MG/DL (ref 0.5–0.9)
CULTURE: ABNORMAL
CULTURE: ABNORMAL
GFR AFRICAN AMERICAN: 30 ML/MIN
GFR NON-AFRICAN AMERICAN: 25 ML/MIN
GFR SERPL CREATININE-BSD FRML MDRD: ABNORMAL ML/MIN/{1.73_M2}
GLUCOSE BLD-MCNC: 246 MG/DL (ref 65–105)
GLUCOSE BLD-MCNC: 307 MG/DL (ref 65–105)
GLUCOSE BLD-MCNC: 325 MG/DL (ref 70–99)
GLUCOSE BLD-MCNC: 408 MG/DL (ref 65–105)
HCT VFR BLD CALC: 25.9 % (ref 36–46)
HEMOGLOBIN: 8.8 G/DL (ref 12–16)
MCH RBC QN AUTO: 31.7 PG (ref 26–34)
MCHC RBC AUTO-ENTMCNC: 33.9 G/DL (ref 31–37)
MCV RBC AUTO: 93.5 FL (ref 80–100)
PDW BLD-RTO: 17.8 % (ref 11.5–14.9)
PLATELET # BLD: 139 K/UL (ref 150–450)
PMV BLD AUTO: 8.3 FL (ref 6–12)
POTASSIUM SERPL-SCNC: 4.1 MMOL/L (ref 3.7–5.3)
RBC # BLD: 2.78 M/UL (ref 4–5.2)
SODIUM BLD-SCNC: 134 MMOL/L (ref 135–144)
SPECIMEN DESCRIPTION: ABNORMAL
TOTAL PROTEIN: 6.1 G/DL (ref 6.4–8.3)
WBC # BLD: 5.1 K/UL (ref 3.5–11)

## 2022-06-08 PROCEDURE — 80053 COMPREHEN METABOLIC PANEL: CPT

## 2022-06-08 PROCEDURE — 97166 OT EVAL MOD COMPLEX 45 MIN: CPT

## 2022-06-08 PROCEDURE — 6360000002 HC RX W HCPCS: Performed by: FAMILY MEDICINE

## 2022-06-08 PROCEDURE — 2500000003 HC RX 250 WO HCPCS: Performed by: FAMILY MEDICINE

## 2022-06-08 PROCEDURE — 2060000000 HC ICU INTERMEDIATE R&B

## 2022-06-08 PROCEDURE — 6360000002 HC RX W HCPCS: Performed by: STUDENT IN AN ORGANIZED HEALTH CARE EDUCATION/TRAINING PROGRAM

## 2022-06-08 PROCEDURE — 85027 COMPLETE CBC AUTOMATED: CPT

## 2022-06-08 PROCEDURE — 97162 PT EVAL MOD COMPLEX 30 MIN: CPT

## 2022-06-08 PROCEDURE — 97530 THERAPEUTIC ACTIVITIES: CPT

## 2022-06-08 PROCEDURE — 6370000000 HC RX 637 (ALT 250 FOR IP): Performed by: FAMILY MEDICINE

## 2022-06-08 PROCEDURE — 36415 COLL VENOUS BLD VENIPUNCTURE: CPT

## 2022-06-08 PROCEDURE — 97116 GAIT TRAINING THERAPY: CPT

## 2022-06-08 PROCEDURE — 99232 SBSQ HOSP IP/OBS MODERATE 35: CPT | Performed by: FAMILY MEDICINE

## 2022-06-08 PROCEDURE — 82947 ASSAY GLUCOSE BLOOD QUANT: CPT

## 2022-06-08 PROCEDURE — 2580000003 HC RX 258: Performed by: STUDENT IN AN ORGANIZED HEALTH CARE EDUCATION/TRAINING PROGRAM

## 2022-06-08 PROCEDURE — 6370000000 HC RX 637 (ALT 250 FOR IP): Performed by: STUDENT IN AN ORGANIZED HEALTH CARE EDUCATION/TRAINING PROGRAM

## 2022-06-08 RX ORDER — INSULIN GLARGINE 100 [IU]/ML
60 INJECTION, SOLUTION SUBCUTANEOUS EVERY MORNING
Status: DISCONTINUED | OUTPATIENT
Start: 2022-06-09 | End: 2022-06-10

## 2022-06-08 RX ADMIN — SODIUM CHLORIDE, PRESERVATIVE FREE 10 ML: 5 INJECTION INTRAVENOUS at 21:29

## 2022-06-08 RX ADMIN — TAMSULOSIN HYDROCHLORIDE 0.4 MG: 0.4 CAPSULE ORAL at 09:36

## 2022-06-08 RX ADMIN — DOCUSATE SODIUM 100 MG: 100 CAPSULE, LIQUID FILLED ORAL at 21:18

## 2022-06-08 RX ADMIN — SODIUM BICARBONATE 650 MG TABLET 650 MG: at 09:35

## 2022-06-08 RX ADMIN — HEPARIN SODIUM 5000 UNITS: 5000 INJECTION INTRAVENOUS; SUBCUTANEOUS at 21:17

## 2022-06-08 RX ADMIN — CIPROFLOXACIN 400 MG: 2 INJECTION, SOLUTION INTRAVENOUS at 09:51

## 2022-06-08 RX ADMIN — CARVEDILOL 3.12 MG: 3.12 TABLET, FILM COATED ORAL at 21:23

## 2022-06-08 RX ADMIN — INSULIN LISPRO 4 UNITS: 100 INJECTION, SOLUTION INTRAVENOUS; SUBCUTANEOUS at 17:06

## 2022-06-08 RX ADMIN — ACETAMINOPHEN 650 MG: 325 TABLET ORAL at 22:07

## 2022-06-08 RX ADMIN — NYSTATIN OINTMENT: 100000 OINTMENT TOPICAL at 21:33

## 2022-06-08 RX ADMIN — ANTI-FUNGAL POWDER MICONAZOLE NITRATE TALC FREE: 1.42 POWDER TOPICAL at 21:33

## 2022-06-08 RX ADMIN — INSULIN LISPRO 12 UNITS: 100 INJECTION, SOLUTION INTRAVENOUS; SUBCUTANEOUS at 11:33

## 2022-06-08 RX ADMIN — Medication 9 MG: at 21:23

## 2022-06-08 RX ADMIN — FUROSEMIDE 80 MG: 40 TABLET ORAL at 09:51

## 2022-06-08 RX ADMIN — HEPARIN SODIUM 5000 UNITS: 5000 INJECTION INTRAVENOUS; SUBCUTANEOUS at 05:51

## 2022-06-08 RX ADMIN — ANTI-FUNGAL POWDER MICONAZOLE NITRATE TALC FREE: 1.42 POWDER TOPICAL at 09:36

## 2022-06-08 RX ADMIN — DOCUSATE SODIUM 100 MG: 100 CAPSULE, LIQUID FILLED ORAL at 09:35

## 2022-06-08 RX ADMIN — SODIUM CHLORIDE, PRESERVATIVE FREE 10 ML: 5 INJECTION INTRAVENOUS at 09:39

## 2022-06-08 RX ADMIN — BUSPIRONE HYDROCHLORIDE 10 MG: 10 TABLET ORAL at 14:02

## 2022-06-08 RX ADMIN — SODIUM BICARBONATE 650 MG TABLET 650 MG: at 21:23

## 2022-06-08 RX ADMIN — CARVEDILOL 3.12 MG: 3.12 TABLET, FILM COATED ORAL at 09:35

## 2022-06-08 RX ADMIN — FUROSEMIDE 80 MG: 40 TABLET ORAL at 17:06

## 2022-06-08 RX ADMIN — CIPROFLOXACIN 400 MG: 2 INJECTION, SOLUTION INTRAVENOUS at 21:32

## 2022-06-08 RX ADMIN — ASPIRIN 81 MG: 81 TABLET, CHEWABLE ORAL at 09:36

## 2022-06-08 RX ADMIN — INSULIN GLARGINE 40 UNITS: 100 INJECTION, SOLUTION SUBCUTANEOUS at 09:39

## 2022-06-08 RX ADMIN — ATORVASTATIN CALCIUM 80 MG: 80 TABLET, FILM COATED ORAL at 09:35

## 2022-06-08 RX ADMIN — BUSPIRONE HYDROCHLORIDE 10 MG: 10 TABLET ORAL at 09:36

## 2022-06-08 RX ADMIN — SODIUM BICARBONATE 650 MG TABLET 650 MG: at 14:02

## 2022-06-08 RX ADMIN — ONDANSETRON 4 MG: 4 TABLET, ORALLY DISINTEGRATING ORAL at 09:37

## 2022-06-08 RX ADMIN — RANOLAZINE 1000 MG: 500 TABLET, EXTENDED RELEASE ORAL at 21:18

## 2022-06-08 RX ADMIN — FEBUXOSTAT 40 MG: 40 TABLET, FILM COATED ORAL at 09:36

## 2022-06-08 RX ADMIN — RANOLAZINE 1000 MG: 500 TABLET, EXTENDED RELEASE ORAL at 09:36

## 2022-06-08 RX ADMIN — BUSPIRONE HYDROCHLORIDE 10 MG: 10 TABLET ORAL at 21:18

## 2022-06-08 RX ADMIN — PANTOPRAZOLE SODIUM 40 MG: 40 TABLET, DELAYED RELEASE ORAL at 05:51

## 2022-06-08 RX ADMIN — NYSTATIN OINTMENT: 100000 OINTMENT TOPICAL at 09:36

## 2022-06-08 RX ADMIN — INSULIN LISPRO 4 UNITS: 100 INJECTION, SOLUTION INTRAVENOUS; SUBCUTANEOUS at 21:24

## 2022-06-08 RX ADMIN — INSULIN LISPRO 8 UNITS: 100 INJECTION, SOLUTION INTRAVENOUS; SUBCUTANEOUS at 09:39

## 2022-06-08 RX ADMIN — HEPARIN SODIUM 5000 UNITS: 5000 INJECTION INTRAVENOUS; SUBCUTANEOUS at 14:02

## 2022-06-08 RX ADMIN — TRAZODONE HYDROCHLORIDE 75 MG: 50 TABLET ORAL at 21:17

## 2022-06-08 ASSESSMENT — PAIN DESCRIPTION - ORIENTATION: ORIENTATION: RIGHT;LEFT

## 2022-06-08 ASSESSMENT — PAIN SCALES - GENERAL
PAINLEVEL_OUTOF10: 0
PAINLEVEL_OUTOF10: 5
PAINLEVEL_OUTOF10: 0

## 2022-06-08 ASSESSMENT — PAIN DESCRIPTION - LOCATION: LOCATION: HAND

## 2022-06-08 ASSESSMENT — PAIN DESCRIPTION - DESCRIPTORS: DESCRIPTORS: DISCOMFORT;TIGHTNESS

## 2022-06-08 NOTE — PLAN OF CARE
Problem: Discharge Planning  Goal: Discharge to home or other facility with appropriate resources  6/8/2022 1626 by Gael Mas RN  Outcome: Progressing  Flowsheets (Taken 6/8/2022 0954)  Discharge to home or other facility with appropriate resources: Identify barriers to discharge with patient and caregiver     Problem: Safety - Adult  Goal: Free from fall injury  6/8/2022 1626 by Gale Mas RN  Outcome: Progressing  Flowsheets (Taken 6/8/2022 1353)  Free From Fall Injury: Instruct family/caregiver on patient safety     Problem: Nutrition Deficit:  Goal: Optimize nutritional status  6/8/2022 1626 by Gael Mas RN  Outcome: Progressing     Problem: Chronic Conditions and Co-morbidities  Goal: Patient's chronic conditions and co-morbidity symptoms are monitored and maintained or improved  Outcome: Progressing  Flowsheets (Taken 6/8/2022 0954)  Care Plan - Patient's Chronic Conditions and Co-Morbidity Symptoms are Monitored and Maintained or Improved: Monitor and assess patient's chronic conditions and comorbid symptoms for stability, deterioration, or improvement     Problem: ABCDS Injury Assessment  Goal: Absence of physical injury  Outcome: Progressing

## 2022-06-08 NOTE — FLOWSHEET NOTE
06/08/22 1530   Encounter Summary   Encounter Overview/Reason  Volunteer Encounter   Service Provided For: Patient   Referral/Consult From: Nhi   Last Encounter  06/08/22  (V)   Complexity of Encounter Low   Spiritual/Emotional needs   Type Spiritual Support   Rituals, Rites and [address]

## 2022-06-08 NOTE — PROGRESS NOTES
NEPHROLOGY PROGRESS NOTE    Patient :  Jessica Yin; 57 y.o. MRN# [id number]  Location:  2106/2106-01  Attending: Iona Gomes MD  Admit Date:  6/6/2022   Hospital Day: 2      Reason for Consult: ESRD/hemodialysis      Chief Complaint: Uncontrolled diabetes    Subjective/interval history. Patient seen and examined, denies any new complaints, no acute events overnight  Blood pressure stable  Patient had dialysis yesterday    History of Present Illness: This is a 57 y.o. female with hypertension, type 2 diabetes mellitus, insulin-dependent diabetes mellitus, end-stage renal disease [facility] [address] [facility]   Patient presented to the hospital with complaints of uncontrolled high sugars her sugars were above 400, patient was not found in DKA, anion gap was 11  Patient denied any other complaint no nausea vomiting no diarrhea no chest pain or shortness of breath  Patient had dialysis on Saturday      Past Medical History:        Diagnosis Date    Backache, unspecified     [facility]F [facility]congestive heart failure) ([facility] [address]     Chronic kidney disease     Coronary atherosclerosis of artery bypass graft     COVID 1/31/2022    Cramp of limb     Gallstones     Hypertension     Insomnia     Pneumonia     Type II or unspecified type diabetes mellitus with renal manifestations, not stated as uncontrolled(250.40)     Type II or unspecified type diabetes mellitus without mention of complication, not stated as uncontrolled     Unspecified vitamin D deficiency        Past Surgical History:        Procedure Laterality Date    ANKLE FRACTURE SURGERY      AV FISTULA CREATION  12/14/2021    BREAST SURGERY      CARPAL TUNNEL RELEASE      CHOLECYSTECTOMY, OPEN N/A     CORONARY ARTERY BYPASS GRAFT      x3    DIALYSIS FISTULA CREATION Left 12/14/2021    LEFT AV FISTULA CREATION UPPER EXTREMITY performed by Paloma Redman MD at [address] [facility] IR TUNNELED CATHETER PLACEMENT GREATER THAN 5 YEARS  8/18/2021    IR TUNNELED CATHETER PLACEMENT GREATER THAN 5 YEARS 8/18/2021 [facility] SPECIAL PROCEDURES    KNEE ARTHROSCOPY      right    OTHER SURGICAL HISTORY  04/06/2022    cvc exchange    TONSILLECTOMY         Current Medications:    nystatin (MYCOSTATIN) ointment, BID  magnesium sulfate 2000 mg in water 50 mL IVPB, Once  sodium chloride flush 0.9 % injection 5-40 mL, 2 times per day  sodium chloride flush 0.9 % injection 5-40 mL, PRN  0.9 % sodium chloride infusion, PRN  acetaminophen (TYLENOL) tablet 650 mg, Q4H PRN  ondansetron (ZOFRAN-ODT) disintegrating tablet 4 mg, Q8H PRN   Or  ondansetron (ZOFRAN) injection 4 mg, Q6H PRN  heparin (porcine) injection 5,000 Units, 3 times per day  cefTRIAXone (ROCEPHIN) 1000 mg IVPB in NS 50ml minibag, Once  aspirin chewable tablet 81 mg, Daily  ranolazine (RANEXA) extended release tablet 1,000 mg, BID  busPIRone (BUSPAR) tablet 10 mg, TID  pantoprazole (PROTONIX) tablet 40 mg, QAM AC  atorvastatin (LIPITOR) tablet 80 mg, Daily  febuxostat (ULORIC) tablet 40 mg, Daily  docusate sodium (COLACE) capsule 100 mg, BID  tamsulosin (FLOMAX) capsule 0.4 mg, Daily  sodium bicarbonate tablet 650 mg, TID  furosemide (LASIX) tablet 80 mg, BID  traZODone (DESYREL) tablet 75 mg, Nightly  melatonin tablet 9 mg, Nightly  lidocaine-prilocaine (EMLA) cream, PRN  carvedilol (COREG) tablet 3.125 mg, BID  insulin glargine (LANTUS) injection vial 40 Units, QAM  miconazole (MICOTIN) 2 % powder, BID  insulin lispro (HUMALOG) injection vial 0-12 Units, TID WC  insulin lispro (HUMALOG) injection vial 0-6 Units, Nightly  glucose chewable tablet 16 g, PRN  dextrose bolus 10% 125 mL, PRN   Or  dextrose bolus 10% 250 mL, PRN  glucagon (rDNA) injection 1 mg, PRN  dextrose 5 % solution, PRN  ciprofloxacin (CIPRO) IVPB 400 mg, Q12H        Allergies:  Adhesive tape, Ace inhibitors, Iv dye [iodides], Nsaids, and Metformin and related    Social History:   Social History Socioeconomic History    Marital status: Single     Spouse name: Not on file    Number of children: Not on file    Years of education: Not on file    Highest education level: Not on file   Occupational History    Not on file   Tobacco Use    Smoking status: Never Smoker    Smokeless tobacco: Never Used   Vaping Use    Vaping Use: Never used   Substance and Sexual Activity    Alcohol use: No    Drug use: No    Sexual activity: Not Currently   Other Topics Concern    Not on file   Social History Narrative    Not on file     Social Determinants of Health     Financial Resource Strain: Low Risk     Difficulty of Paying Living Expenses: Not very hard   Food Insecurity: No Food Insecurity    Worried About Running Out of Food in the Last Year: Never true    Nany of Food in the Last Year: Never true   Transportation Needs: No Transportation Needs    Lack of Transportation (Medical): No    Lack of Transportation (Non-Medical): No   Physical Activity: Inactive    Days of Exercise per Week: 0 days    Minutes of Exercise per Session: 0 min   Stress: Stress Concern Present    Feeling of Stress :  To some extent   Social Connections: Socially Isolated    Frequency of Communication with Friends and Family: More than three times a week    Frequency of Social Gatherings with Friends and Family: More than three times a week    Attends Islam Services: Never    Active Member of Clubs or Organizations: No    Attends Club or Organization Meetings: Never    Marital Status: Never    Intimate Partner Violence:     Fear of Current or Ex-Partner: Not on file    Emotionally Abused: Not on file    Physically Abused: Not on file    Sexually Abused: Not on file   Housing Stability: [address] Unable to Pay for Housing in the Last Year: No    Number of [place] in the Last Year: 1    Unstable Housing in the Last Year: No           Objective:  CURRENT TEMPERATURE:  Temp: 98.8 °F (37.1 °C)  MAXIMUM TEMPERATURE OVER 24HRS:  Temp (24hrs), Av.9 °F (37.2 °C), Min:98.4 °F (36.9 °C), Max:99.7 °F (37.6 °C)    CURRENT RESPIRATORY RATE:  Resp: 18  CURRENT PULSE:  Heart Rate: 77  CURRENT BLOOD PRESSURE:  BP: (!) 119/47  24HR BLOOD PRESSURE RANGE:  Systolic (80HKW), UZP:858 , Min:119 , QCK:706   ; Diastolic (56EJH), AWL:03, Min:47, Max:53    24HR INTAKE/OUTPUT:      Intake/Output Summary (Last 24 hours) at 2022 1703  Last data filed at 2022 0535  Gross per 24 hour   Intake 1040. 35 ml   Output --   Net 1040. 35 ml     Patient Vitals for the past 96 hrs (Last 3 readings):   Weight   22 0000 221 lb 5.5 oz (100.4 kg)   22 1600 233 lb 4 oz (105.8 kg)   22 1216 237 lb 10.5 oz (107.8 kg)       Physical Exam:  GENERAL APPEARANCE: Alert and cooperative, and appears to be in no acute distress. HEAD: normocephalic  EYES: EOMI. Not pale, anicteric   NOSE:  No nasal discharge. THROAT:  Oral cavity and pharynx normal. Moist  CARDIAC:  Rhythm is regular. LUNGS: Normal respiratory effort no accessory muscle use  NECK: Neck supple, non-tender   GI-soft nontender  MUSKULOSKELETAL: Adequately aligned spine. No joint erythema or tenderness. EXTREMITIES: No edema. Peripheral pulses intact. NEURO: Nonfocal    Labs:   CBC:  Recent Labs     22  1551 22  0510 22  0558   WBC 3.1* 3.9 5.1   RBC 2.81* 2.75* 2.78*   HGB 8.7* 8.6* 8.8*   HCT 26.3* 26.5* 25.9*   MCV 93.9 96.3 93.5   MCH 31.1 31.1 31.7   MCHC 33.2 32.3 33.9   RDW 17.7* 17.8* 17.8*   * 151 139*   MPV 8.2 8.5 8.3      BMP:   Recent Labs     22  1551 22  0510 22  0558    139 134*   K 3.1* 3.6* 4.1    103 98   CO2 28 25 28   BUN 23 21 15   CREATININE 2.44* 2.28* 2.04*   GLUCOSE 460* 171* 325*   CALCIUM 8.9 8.5* 8.5*      Phosphorus:  No results for input(s): PHOS in the last 72 hours. Magnesium: No results for input(s): MG in the last 72 hours.   Albumin:   Recent Labs hemodialysis Tuesday Thursday Saturday   Diabetes management per primary team      Thank you for the consultation.       Electronically signed by Shauna Ramirez MD on 6/8/2022 at 5:03 PM

## 2022-06-08 NOTE — CARE COORDINATION
ONGOING DISCHARGE PLAN:    Patient is alert and oriented x4. Spoke with patient regarding discharge plan and patient confirms that plan is still to return to home. Pt. [facility]/ [facility]. Pt. Having elevated Blood Sugars in the 400's Today. K+ today 4.1. Denies needs. Will continue to follow for additional discharge needs.     Electronically signed by Jaylon Watson RN on 6/8/2022 at 11:53 AM

## 2022-06-08 NOTE — PROGRESS NOTES
FAMILY MEDICINE  - PROGRESS NOTE    Date:  6/8/2022  Celia Lundberg  [id number]      Chief Complaint   Patient presents with    Hyperglycemia         Interval History:  Improved, she states that she is starting to feel better. No significant events overnight. Specialists notes, labs & imaging reviewed.       Subjective  Constitutional: positive for obesity  Cardiovascular: positive for lower extremity edema  Hematologic/lymphatic: positive for pallor  Behavioral/Psych: positive for anxiety  Endocrine: positive for diabetic symptoms including hyperglycemia:    Objective:    BP (!) 134/51   Pulse 91   Temp 98.8 °F (37.1 °C) (Oral)   Resp 16   Ht 5' 5\" (1.651 m)   Wt 221 lb 5.5 oz (100.4 kg)   SpO2 91%   BMI 36.83 kg/m²   General appearance - alert, well appearing, and in no distress and overweight  Mental status - alert, oriented to person, place, and time  Eyes - pupils equal and reactive, extraocular eye movements intact  Ears - hearing grossly normal bilaterally  Nose - normal and patent, no erythema, discharge or polyps  Mouth - mucous membranes moist, pharynx normal without lesions  Neck - supple, no significant adenopathy  Lymphatics - no palpable lymphadenopathy, no hepatosplenomegaly  Chest - clear to auscultation, no wheezes, rales or rhonchi, symmetric air entry  Heart - normal rate, regular rhythm, normal S1, S2, no murmurs, rubs, clicks or gallops  Abdomen - soft, nontender, nondistended, no masses or organomegaly  Breasts - not examined  Back exam - not examined  Neurological - alert, oriented, normal speech, no focal findings or movement disorder noted  Musculoskeletal - no joint tenderness, deformity or swelling  Extremities - pedal edema trace +  Skin - normal coloration and turgor, no rashes, no suspicious skin lesions noted    Data:   Medications:   Current Facility-Administered Medications   Medication Dose Route Frequency Provider Last Rate Last Admin    nystatin (MYCOSTATIN) ointment   Topical BID Radha Bowie MD   Given at 06/07/22 1836    magnesium sulfate 2000 mg in water 50 mL IVPB  2,000 mg IntraVENous Once Damari Gonzalez DO        sodium chloride flush 0.9 % injection 5-40 mL  5-40 mL IntraVENous 2 times per day Damari Gonzalez DO   10 mL at 06/07/22 2106    sodium chloride flush 0.9 % injection 5-40 mL  5-40 mL IntraVENous PRN Daniel Shaikh,         0.9 % sodium chloride infusion   IntraVENous PRN Damari Gonzalez,         acetaminophen (TYLENOL) tablet 650 mg  650 mg Oral Q4H PRN Damari Gonzalez, DO   650 mg at 06/07/22 2102    ondansetron (ZOFRAN-ODT) disintegrating tablet 4 mg  4 mg Oral Q8H PRN Daniel Shaikh, DO   4 mg at 06/07/22 1332    Or    ondansetron (ZOFRAN) injection 4 mg  4 mg IntraVENous Q6H PRN Damari Gonzalez, DO        heparin (porcine) injection 5,000 Units  5,000 Units SubCUTAneous 3 times per day Damari Gonzalez DO   5,000 Units at 06/08/22 0551    cefTRIAXone (ROCEPHIN) 1000 mg IVPB in NS 50ml minibag  1,000 mg IntraVENous Once Damari Gonzalez DO        aspirin chewable tablet 81 mg  81 mg Oral Daily Radha Bowie MD   81 mg at 06/07/22 0831    ranolazine (RANEXA) extended release tablet 1,000 mg  1,000 mg Oral BID Radha Bowie MD   1,000 mg at 06/07/22 2052    busPIRone (BUSPAR) tablet 10 mg  10 mg Oral TID Radha Bowie MD   10 mg at 06/07/22 2052    pantoprazole (PROTONIX) tablet 40 mg  40 mg Oral QAM AC Radha Bowie MD   40 mg at 06/08/22 0551    atorvastatin (LIPITOR) tablet 80 mg  80 mg Oral Daily Radha Bowie MD   80 mg at 06/07/22 0831    febuxostat (ULORIC) tablet 40 mg  40 mg Oral Daily Radha Bowie MD   40 mg at 06/07/22 0843    docusate sodium (COLACE) capsule 100 mg  100 mg Oral BID Radha Bowie MD   100 mg at 06/07/22 2052    tamsulosin (FLOMAX) capsule 0.4 mg  0.4 mg Oral Daily Radha Bowie MD   0.4 mg at 06/07/22 3018    sodium bicarbonate tablet 650 mg  650 mg Oral TID Anju Burns MD   650 mg at 06/07/22 2051    furosemide (LASIX) tablet 80 mg  80 mg Oral BID Anju Burns MD   80 mg at 06/07/22 1832    traZODone (DESYREL) tablet 75 mg  75 mg Oral Nightly Anju Burns MD   75 mg at 06/07/22 2052    melatonin tablet 9 mg  9 mg Oral Nightly Anju Burns MD   9 mg at 06/07/22 2052    lidocaine-prilocaine (EMLA) cream   Topical PRN Anju Burns MD        carvedilol (COREG) tablet 3.125 mg  3.125 mg Oral BID Anju Burns MD   3.125 mg at 06/07/22 2052    insulin glargine (LANTUS) injection vial 40 Units  40 Units SubCUTAneous QAM Anju Burns MD   40 Units at 06/07/22 0834    miconazole (MICOTIN) 2 % powder   Topical BID Anju Burns MD        insulin lispro (HUMALOG) injection vial 0-12 Units  0-12 Units SubCUTAneous TID WC Anju Burns MD   4 Units at 06/07/22 1832    insulin lispro (HUMALOG) injection vial 0-6 Units  0-6 Units SubCUTAneous Nightly Anju Burns MD   4 Units at 06/07/22 2103    glucose chewable tablet 16 g  4 tablet Oral PRN Anju Burns MD        dextrose bolus 10% 125 mL  125 mL IntraVENous PRANAHY Burns MD        Or    dextrose bolus 10% 250 mL  250 mL IntraVENous PRANAHY Burns MD        glucagon (rDNA) injection 1 mg  1 mg IntraMUSCular PRANAHY Burns MD        dextrose 5 % solution  100 mL/hr IntraVENous PRANAHY Burns MD        ciprofloxacin (CIPRO) IVPB 400 mg  400 mg IntraVENous Q12H Anju Burns MD   Stopped at 06/07/22 2148       Intake/Output Summary (Last 24 hours) at 6/8/2022 0625  Last data filed at 6/8/2022 0535  Gross per 24 hour   Intake 2550.35 ml   Output 3000 ml   Net -449.65 ml     Recent Results (from the past 24 hour(s))   POC Glucose Fingerstick    Collection Time: 06/07/22 11:20 AM   Result Value Ref Range    POC Glucose 286 (H) 65 - 105 mg/dL   POC Glucose Fingerstick    Collection Time: 06/07/22 4:21 PM   Result Value Ref Range    POC Glucose 210 (H) 65 - 105 mg/dL   POC Glucose Fingerstick    Collection Time: 06/07/22  8:47 PM   Result Value Ref Range    POC Glucose 305 (H) 65 - 105 mg/dL   CBC    Collection Time: 06/08/22  5:58 AM   Result Value Ref Range    WBC 5.1 3.5 - 11.0 k/uL    RBC 2.78 (L) 4.0 - 5.2 m/uL    Hemoglobin 8.8 (L) 12.0 - 16.0 g/dL    Hematocrit 25.9 (L) 36 - 46 %    MCV 93.5 80 - 100 fL    MCH 31.7 26 - 34 pg    MCHC 33.9 31 - 37 g/dL    RDW 17.8 (H) 11.5 - 14.9 %    Platelets 125 (L) 447 - 450 k/uL    MPV 8.3 6.0 - 12.0 fL     -----------------------------------------------------------------  RAD:  EKG:  Micro:     Assessment & Plan:    Patient Active Problem List:     Atherosclerosis of coronary artery bypass graft of native heart without angina pectoris     Chronic diastolic heart failure ([facility])     Diabetic polyneuropathy associated with type 2 diabetes mellitus ([facility])     History of coronary artery bypass graft     Iron deficiency anemia     Spinal stenosis of lumbar region with neurogenic claudication     Mixed hyperlipidemia     Type 2 diabetes 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, with long-term current use of insulin ([facility])     Obesity, Class II, BMI 35-39.9     Thyroid nodule greater than or equal to 1 cm in diameter incidentally noted on imaging study     Essential hypertension     Chronic ischemic heart disease     Ischemic stroke of frontal lobe ([facility])     Morbid obesity with BMI of 40.0-44.9, adult ([facility])     Disequilibrium syndrome     Generalized weakness     Long-term memory loss     Muscle right arm weakness     Anxiety     Chronic midline low back pain with bilateral sciatica     Tremor     COVID     End-stage renal disease ([facility])     Diabetic ketoacidosis without coma associated with type 2 diabetes mellitus ([facility])     Hypokalemia     Confusion     Myoclonic jerking     Hyperglycemia           Plan:  -Hyperglycemia without DKA - BS's elevated today, increase Lantus to 60 units daily. -ESRD on dialysis - Nephrology managing, HD on T,TH,SAT. -D/C plan is home with VNS. -Continue current treatments.  -Complete orders per chart.     See orders   Disposition:    Electronically signed by Kelli Gallegos MD on 6/8/2022 at 6:25 AM

## 2022-06-08 NOTE — PLAN OF CARE
Problem: Discharge Planning  Goal: Discharge to home or other facility with appropriate resources  6/8/2022 0342 by Jan Rivera RN  Outcome: Progressing     Problem: Safety - Adult  Goal: Free from fall injury  6/8/2022 0342 by Jan Rivera RN  Outcome: Progressing     Problem: Nutrition Deficit:  Goal: Optimize nutritional status  6/8/2022 0342 by Jan Rivera RN  Outcome: Progressing

## 2022-06-08 NOTE — PROGRESS NOTES
Occupational Therapy  Facility/Department: [address] [place] [address]  Occupational Therapy Initial Assessment    Name: Andi To  : 1960  MRN: [id number]  Date of Service: 2022    Discharge Recommendations:  Home with assist PRN,Home with Home health OT  OT Equipment Recommendations  Other: TBD       Patient Diagnosis(es): The primary encounter diagnosis was Hyperglycemia. A diagnosis of Hypokalemia was also pertinent to this visit. Past Medical History:  has a past medical history of Backache, unspecified, CHF (congestive heart failure) ([address] Chronic kidney disease, Coronary atherosclerosis of artery bypass graft, COVID, Cramp of limb, Gallstones, Hypertension, Insomnia, Pneumonia, Type II or unspecified type diabetes mellitus with renal manifestations, not stated as uncontrolled(250.40), Type II or unspecified type diabetes mellitus without mention of complication, not stated as uncontrolled, and Unspecified vitamin D deficiency. Past Surgical History:  has a past surgical history that includes Coronary artery bypass graft; Knee arthroscopy; Carpal tunnel release; Breast surgery; Tonsillectomy; Hand surgery; Ankle fracture surgery; Cholecystectomy, open (N/A); IR TUNNELED CVC PLACE WO SQ PORT/PUMP > 5 YEARS (2021); AV fistula creation (2021); Dialysis fistula creation (Left, 2021); and other surgical history (2022). Treatment Diagnosis: Impaired self-care status      Assessment   Performance deficits / Impairments: Decreased functional mobility ; Decreased ADL status; Decreased strength;Decreased endurance;Decreased balance;Decreased high-level IADLs  Treatment Diagnosis: Impaired self-care status  Prognosis: Good  Decision Making: Medium Complexity  REQUIRES OT FOLLOW-UP: Yes  Activity Tolerance  Activity Tolerance: Patient Tolerated treatment well        Plan   Plan  Times per Week: 3-4  Current Treatment Recommendations: Strengthening,Balance training,Functional mobility training,Endurance training,Safety education & training,Patient/Caregiver education & training,Equipment evaluation, education, & procurement,Self-Care / ADL     Restrictions  Restrictions/Precautions  Restrictions/Precautions: Fall Risk,General Precautions  Required Braces or Orthoses?: No  Position Activity Restriction  Other position/activity restrictions: up with assist, NO BP on L  UE due fistula    Subjective   General  Chart Reviewed: Yes  Patient assessed for rehabilitation services?: Yes  Additional Pertinent Hx: 57 y.o. female who presents to [facility] with complaints of hyperglycemia. She reports that she has had trouble controlling her blood sugars for the past several weeks. She reports taking her insulin as prescribed but the patient has known memory problems. She also complains of some associated body aches. She has end stage renal disease on chronic dialysis  Family / Caregiver Present: No  Referring Practitioner: Iona Gomes MD  Diagnosis: Hyperglycemia  Subjective  Subjective: \"Is the water supposed to taste gross? \"  General Comment  Comments: Okay for OT/PT evaluation per RN.  Pt is pleasant and agreeable for session     Social/Functional History  Social/Functional History  Lives With: Parent  Type of Home: Condo  Home Layout: One level  Home Access: Ramped entrance  Bathroom Shower/Tub: Walk-in shower,Doors  Bathroom Toilet: Handicap height  Bathroom Equipment: Grab bars in shower,Built-in shower seat  Bathroom Accessibility: Accessible  Home Equipment: Grab bars,Walker, 4 wheeled,Cane,Sock aid,Hospital bed  Has the patient had two or more falls in the past year or any fall with injury in the past year?: Yes  Receives Help From: Family  ADL Assistance: Independent  Homemaking Assistance:  (Pt completes household chores)  Homemaking Responsibilities: Yes  Ambulation Assistance: Independent (Wheeled walker at all times)  Transfer Assistance: Independent  Active : No  Patient's  Info:   IADL Comments: adjustable (hospital bed)  Additional Comments:  comes twice a day       Objective   Heart Rate: 77  Heart Rate Source: Monitor  BP: (!) 119/47  BP Location: Right upper arm  Patient Position: Sitting;Up in chair  MAP (Calculated): 71  Resp: 18  SpO2: 93 %  O2 Device: Nasal cannula  Comment: when entering room Pt was not wearing nasal cannula. Ambulating 8ft resulted in SOB (2L of O2 per nasal cannula)  Vision Exceptions: Wears glasses for reading  Hearing: Within functional limits       Observation/Palpation  Posture: Fair  Observation: peripheral R IV antecubital, hemodialysis access R internal jugular  Edema: pitting edema on bilateral LE  Safety Devices  Type of Devices: All fall risk precautions in place;Call light within reach;Gait belt;Patient at risk for falls; Left in chair;Nurse notified        AROM: Within functional limits  Strength: Within functional limits  Coordination: Within functional limits  Tone: Normal  ADL  Feeding: Modified independent   Grooming: Supervision  UE Bathing: Supervision  LE Bathing: Supervision  UE Dressing: Supervision  LE Dressing: Supervision  Toileting: Supervision  Additional Comments: ADL scores based on skilled observation and clinical reasoning, unless otherwise noted. Encouraged pt to maria de jesus socks seated in chair. Pt informed OT that she uses sock aid at baseline. Supervision recommended due to dizziness. Tone RUE  RUE Tone: Normotonic  Tone LUE  LUE Tone: Normotonic  Coordination  Movements Are Fluid And Coordinated: Yes  Activity Tolerance  Activity Tolerance: Patient limited by fatigue;Patient limited by endurance; Other (comment)  Activity Tolerance Comments: increased dizziness, light headed, SOB  Bed mobility  Bed Mobility Comments: Pt sitting up in chair at beginning and end of session.    Transfers  Sit to stand: Contact guard assistance  Stand to sit: Contact guard assistance  Transfer Comments: Good hand placement Cognition  Overall Cognitive Status: WNL        Sensation  Overall Sensation Status: Impaired         Education Given To: Patient  Education Provided: Role of Therapy;Plan of Care  Education Method: Verbal  Barriers to Learning: None  Education Outcome: Verbalized understanding;Demonstrated understanding  LUE AROM (degrees)  LUE AROM : WFL  Left Hand AROM (degrees)  Left Hand AROM: WFL  RUE AROM (degrees)  RUE AROM : WFL  Right Hand AROM (degrees)  Right Hand AROM: WFL  LUE Strength  L Hand General: 4/5  LUE Strength Comment: MMT not assessed due to IV placement and dialysis port  RUE Strength  R Hand General: 4/5  RUE Strength Comment: MMT not assessed due to fistula                  G-Code     OutComes Score                                                  AM-PAC Score        AM-PAC Inpatient Daily Activity Raw Score: 18 (06/08/22 1251)  AM-PAC Inpatient ADL T-Scale Score : 38.66 (06/08/22 1251)  ADL Inpatient CMS 0-100% Score: 46.65 (06/08/22 1251)  ADL Inpatient CMS G-Code Modifier : CK (06/08/22 1251)    Goals  Short Term Goals  Time Frame for Short term goals: By discharge  Short Term Goal 1: Pt will perform BADLs mod I, using appropriate AE/AS as needed  Short Term Goal 2: Pt will perform transfers/functional mobility mod I, using least restrictive device, during self care  Short Term Goal 3: Pt will tolerate standing 5+ minutes, UE support as needed, mod I while engaged in self care/functional activity  Short Term Goal 4: Pt will V/D at least three fall prevention/home safety techniques to increase safety in daily routine  Short Term Goal 5: Pt will actively participate in 15+ minutes of therapeutic exercise/functional activity to promote safety and independence with self care       Therapy Time   Individual Concurrent Group Co-treatment   Time In 0834         Time Out 0904         Minutes 30         Timed Code Treatment Minutes: 12 Minutes       Jessica Donna, OT

## 2022-06-08 NOTE — PROGRESS NOTES
Physical Therapy  Facility/Department: [address] [place] [address]  Physical Therapy Initial Assessment    Name: Abiola Dangelo  : 1960  MRN: [id number]  Date of Service: 2022    Discharge Recommendations:  Home with Home health PT   PT Equipment Recommendations  Equipment Needed: No      Patient Diagnosis(es): The primary encounter diagnosis was Hyperglycemia. A diagnosis of Hypokalemia was also pertinent to this visit. Past Medical History:  has a past medical history of Backache, unspecified, CHF (congestive heart failure) ([facility] [address] Chronic kidney disease, Coronary atherosclerosis of artery bypass graft, COVID, Cramp of limb, Gallstones, Hypertension, Insomnia, Pneumonia, Type II or unspecified type diabetes mellitus with renal manifestations, not stated as uncontrolled(250.40), Type II or unspecified type diabetes mellitus without mention of complication, not stated as uncontrolled, and Unspecified vitamin D deficiency. Past Surgical History:  has a past surgical history that includes Coronary artery bypass graft; Knee arthroscopy; Carpal tunnel release; Breast surgery; Tonsillectomy; Hand surgery; Ankle fracture surgery; Cholecystectomy, open (N/A); IR TUNNELED CVC PLACE WO SQ PORT/PUMP > 5 YEARS (2021); AV fistula creation (2021); Dialysis fistula creation (Left, 2021); and other surgical history (2022). Assessment   Body Structures, Functions, Activity Limitations Requiring Skilled Therapeutic Intervention: Decreased endurance;Decreased balance;Decreased strength;Decreased functional mobility   Assessment: Pt PLOF, knowledge on therapy, and support system at home allow for a good PT prognosis. The pt is CGA for sit<>stand with RW (light headed reported), ambulation of 10ftx2 with RW, CGAx1 with rest break due to dizziness and SOB. Pt applied 2L of O2 via nasal cannula after ambulating 10ft (SpO2: 96).   Treatment Diagnosis: decreased endurance due to SOB and dizziness  Specific Instructions for Next Treatment: progress pt distance in ambulation, progress to navigating steps, progress pt duratiion to stand with least restrictive device and no symptoms of dizziness  Therapy Prognosis: Good  Decision Making: Medium Complexity  History: DM, Right toal knee, CVA  Exam: ROM, strength, functional mobility  Clinical Presentation: The pt is CGA for sit<>stand with RW (light headed reported), ambulation of 10ftx2 CGAx1 with rest break due to dizziness and SOB. Pt applied 2L of O2 via nasal cannula after ambulating 10ft (SpO2: 96). Barriers to Learning: none  Requires PT Follow-Up: Yes  Activity Tolerance  Activity Tolerance: Patient limited by fatigue;Patient limited by endurance; Other (comment)  Activity Tolerance Comments: increased dizziness, light headed, SOB     Plan   Plan  Plan: 5-7 times per week  Specific Instructions for Next Treatment: progress pt distance in ambulation, progress to navigating steps, progress pt duratiion to stand with least restrictive device and no symptoms of dizziness  Current Treatment Recommendations: Strengthening,Balance training,Functional mobility training,Endurance training,Gait training,Stair training,Therapeutic activities  Safety Devices  Type of Devices: Patient at risk for falls,Left in chair,Nurse notified,Call light within reach,Gait belt (nurse Vilma, Pt left in bedside chair with legs elevated)     Restrictions  Restrictions/Precautions  Restrictions/Precautions: Fall Risk,General Precautions  Required Braces or Orthoses?: No  Position Activity Restriction  Other position/activity restrictions: up with assist, NO BP on L  UE due fistula     Subjective   Pain: no pain reported  but  feet irritated from dialysis  General  Patient assessed for rehabilitation services?: Yes  Response To Previous Treatment: Not applicable  Family / Caregiver Present: No  Referring Practitioner: Doretha Muir  Referral Date : 06/07/22  Diagnosis: hyperglycemia  Follows Commands: Within Functional Limits  General Comment  Comments: okayed to proceed with PT eval per nurse Vilma  Subjective  Subjective: Pt reports [facility] for ~month and left on Friday the 3rd. Pt reports being on 3L of O2 at home when needed. Social/Functional History  Social/Functional History  Lives With: Parent  Type of Home: Condo  Home Layout: One level  Home Access: Ramped entrance  Bathroom Shower/Tub: Walk-in shower,Doors  Bathroom Toilet: Handicap height  Bathroom Equipment: Grab bars in shower,Built-in shower seat  Bathroom Accessibility: Accessible  Home Equipment: Grab bars,Walker, 4 wheeled,Cane,Sock aid,Hospital bed  Has the patient had two or more falls in the past year or any fall with injury in the past year?: Yes  Receives Help From: Family  ADL Assistance: Independent  Homemaking Assistance:  (Pt completes household chores)  Homemaking Responsibilities: Yes  Ambulation Assistance: Independent (Wheeled walker at all times)  Transfer Assistance: Independent  Active : No  Patient's  Info: sister  IADL Comments: adjustable (hospital bed)  Additional Comments: sister comes twice a day  Vision/Hearing  Vision  Vision: Impaired  Vision Exceptions: Wears glasses for reading  Hearing  Hearing: Within functional limits    Cognition   Orientation  Overall Orientation Status: Within Normal Limits  Orientation Level: Oriented X4  Cognition  Overall Cognitive Status: WNL     Objective     O2 Device: Nasal cannula  Comment: when entering room Pt was not wearing nasal cannula.  Ambulating 8ft resulted in SOB (2L of O2 per nasal cannula)     Observation/Palpation  Posture: Fair  Observation: peripheral R IV antecubital, hemodialysis access R internal jugular  Edema: pitting edema on bilateral LE  Gross Assessment  Sensation: Impaired     AROM RLE (degrees)  RLE AROM: WFL  AROM LLE (degrees)  LLE AROM : WFL  AROM RUE (degrees)  RUE General AROM: see OT UE assessment  AROM LUE (degrees)  LUE General AROM: see OT UE assessment  Strength RLE  Strength RLE: Exception  Comment: grossly 3+/5  Strength LLE  Comment: grossly 4/5  Strength RUE  Comment: see OT UE assessment  Strength LUE  Comment: see OT UE assessment           Bed mobility  Bridging: Unable to assess  Bed Mobility Comments: Pt was sitting in bed side chair when entering room and when exiting  Transfers  Sit to Stand: Contact guard assistance (RW used)  Stand to sit: Contact guard assistance (RW used)  Comment: pt reports light headed and dizziness with standing  Ambulation  Surface: level tile  Device: Rolling Walker  Assistance: Contact guard assistance  Distance: 10ft X 2 (rest break due to dizziness)  Comments: pt reports light headed and dizziness with walking     Balance  Posture: Fair  Sitting - Static: Good;+  Sitting - Dynamic: Good;+  Standing - Static: Fair;+ (RW used)  Standing - Dynamic: Fair;+ (RW used)           OutComes Score                                                  AM-PAC Score     AM-PAC Inpatient Mobility without Stair Climbing Raw Score : 16 (06/08/22 9593)  AM-PAC Inpatient without Stair Climbing T-Scale Score : 45.54 (06/08/22 0370)  Mobility Inpatient CMS 0-100% Score: 40.64 (06/08/22 9824)  Mobility Inpatient without Stair CMS G-Code Modifier : CK (06/08/22 1367)       Goals  Short Term Goals  Time Frame for Short term goals: 5-7 days  Short term goal 1: Pt will be able to tolerate one 30 min of therapeutic exercise to show improvent in endurance. Short term goal 2: Pt will be improve one half MMT grade in bilateral LE to show improvement in stength. Short term goal 3: Pt will be able to perform a half tandem stance and reach above head with least restrictive device with Gloria to show increased balance and ability to reach for object in the kitchen.   Short term goal 4: Pt will be able to perform sit<>stand transfer Gloria with least restrictive device to show ability to safely tranfer at home.  Short term goal 5: Pt will show abilty to walk up a ramp with least restricitve device and supervision to be able to enter home. Additional Goals?: Yes  Short Term Goal 6: Pt will be able to walk 150ft with least restrictive device to re-integrate the pt to commmunity ambulatory status. Short Term Goal 7: Pt will be able to demonstrate bed mobility with Gloria to show progression in [place] to dc home. Patient Goals   Patient goals : to return home with mother       Education  Patient Education  Education Given To: Patient  Education Provided: Role of Therapy;Plan of Care;Transfer Training  Education Method: Demonstration;Verbal  Education Outcome: Verbalized understanding;Continued education needed      Therapy Time   Individual Concurrent Group Co-treatment   Time In 0259         Time Out 0904         Minutes 26         Timed Code Treatment Minutes: 8 Minutes       Robert Palomo    PT evaluation/treatment is completed by SUKUMAR Messina under the direct supervision of co-signing therapist, who agrees with all documentation and evaluation/treatment.

## 2022-06-09 LAB
ALBUMIN SERPL-MCNC: 2.8 G/DL (ref 3.5–5.2)
ALP BLD-CCNC: 90 U/L (ref 35–104)
ALT SERPL-CCNC: 11 U/L (ref 5–33)
ANION GAP SERPL CALCULATED.3IONS-SCNC: 9 MMOL/L (ref 9–17)
AST SERPL-CCNC: 14 U/L
BILIRUB SERPL-MCNC: 0.61 MG/DL (ref 0.3–1.2)
BUN BLDV-MCNC: 21 MG/DL (ref 8–23)
CALCIUM SERPL-MCNC: 8.7 MG/DL (ref 8.6–10.4)
CHLORIDE BLD-SCNC: 98 MMOL/L (ref 98–107)
CO2: 26 MMOL/L (ref 20–31)
CREAT SERPL-MCNC: 2.57 MG/DL (ref 0.5–0.9)
GFR AFRICAN AMERICAN: 23 ML/MIN
GFR NON-AFRICAN AMERICAN: 19 ML/MIN
GFR SERPL CREATININE-BSD FRML MDRD: ABNORMAL ML/MIN/{1.73_M2}
GLUCOSE BLD-MCNC: 240 MG/DL (ref 65–105)
GLUCOSE BLD-MCNC: 258 MG/DL (ref 70–99)
GLUCOSE BLD-MCNC: 288 MG/DL (ref 65–105)
GLUCOSE BLD-MCNC: 320 MG/DL (ref 65–105)
GLUCOSE BLD-MCNC: 331 MG/DL (ref 65–105)
HCT VFR BLD CALC: 26.4 % (ref 36–46)
HEMOGLOBIN: 8.7 G/DL (ref 12–16)
MCH RBC QN AUTO: 31 PG (ref 26–34)
MCHC RBC AUTO-ENTMCNC: 32.8 G/DL (ref 31–37)
MCV RBC AUTO: 94.5 FL (ref 80–100)
PDW BLD-RTO: 18.6 % (ref 11.5–14.9)
PLATELET # BLD: 156 K/UL (ref 150–450)
PMV BLD AUTO: 8 FL (ref 6–12)
POTASSIUM SERPL-SCNC: 3.9 MMOL/L (ref 3.7–5.3)
RBC # BLD: 2.8 M/UL (ref 4–5.2)
SODIUM BLD-SCNC: 133 MMOL/L (ref 135–144)
TOTAL PROTEIN: 6.1 G/DL (ref 6.4–8.3)
WBC # BLD: 3.5 K/UL (ref 3.5–11)

## 2022-06-09 PROCEDURE — 80053 COMPREHEN METABOLIC PANEL: CPT

## 2022-06-09 PROCEDURE — 6360000002 HC RX W HCPCS: Performed by: FAMILY MEDICINE

## 2022-06-09 PROCEDURE — 36415 COLL VENOUS BLD VENIPUNCTURE: CPT

## 2022-06-09 PROCEDURE — 6360000002 HC RX W HCPCS: Performed by: STUDENT IN AN ORGANIZED HEALTH CARE EDUCATION/TRAINING PROGRAM

## 2022-06-09 PROCEDURE — 2500000003 HC RX 250 WO HCPCS: Performed by: INTERNAL MEDICINE

## 2022-06-09 PROCEDURE — 99232 SBSQ HOSP IP/OBS MODERATE 35: CPT | Performed by: FAMILY MEDICINE

## 2022-06-09 PROCEDURE — 2060000000 HC ICU INTERMEDIATE R&B

## 2022-06-09 PROCEDURE — 2580000003 HC RX 258: Performed by: STUDENT IN AN ORGANIZED HEALTH CARE EDUCATION/TRAINING PROGRAM

## 2022-06-09 PROCEDURE — 85027 COMPLETE CBC AUTOMATED: CPT

## 2022-06-09 PROCEDURE — 6370000000 HC RX 637 (ALT 250 FOR IP): Performed by: FAMILY MEDICINE

## 2022-06-09 PROCEDURE — 90935 HEMODIALYSIS ONE EVALUATION: CPT

## 2022-06-09 PROCEDURE — 82947 ASSAY GLUCOSE BLOOD QUANT: CPT

## 2022-06-09 RX ORDER — CIPROFLOXACIN 2 MG/ML
400 INJECTION, SOLUTION INTRAVENOUS EVERY 24 HOURS
Status: DISCONTINUED | OUTPATIENT
Start: 2022-06-09 | End: 2022-06-12 | Stop reason: HOSPADM

## 2022-06-09 RX ADMIN — HEPARIN SODIUM 5000 UNITS: 5000 INJECTION INTRAVENOUS; SUBCUTANEOUS at 06:01

## 2022-06-09 RX ADMIN — ANTI-FUNGAL POWDER MICONAZOLE NITRATE TALC FREE: 1.42 POWDER TOPICAL at 21:28

## 2022-06-09 RX ADMIN — HEPARIN SODIUM 5000 UNITS: 5000 INJECTION INTRAVENOUS; SUBCUTANEOUS at 15:11

## 2022-06-09 RX ADMIN — PANTOPRAZOLE SODIUM 40 MG: 40 TABLET, DELAYED RELEASE ORAL at 06:01

## 2022-06-09 RX ADMIN — CARVEDILOL 3.12 MG: 3.12 TABLET, FILM COATED ORAL at 21:31

## 2022-06-09 RX ADMIN — INSULIN LISPRO 6 UNITS: 100 INJECTION, SOLUTION INTRAVENOUS; SUBCUTANEOUS at 10:21

## 2022-06-09 RX ADMIN — RANOLAZINE 1000 MG: 500 TABLET, EXTENDED RELEASE ORAL at 10:20

## 2022-06-09 RX ADMIN — SODIUM BICARBONATE 650 MG TABLET 650 MG: at 10:20

## 2022-06-09 RX ADMIN — BUSPIRONE HYDROCHLORIDE 10 MG: 10 TABLET ORAL at 21:26

## 2022-06-09 RX ADMIN — SODIUM CHLORIDE, PRESERVATIVE FREE 10 ML: 5 INJECTION INTRAVENOUS at 21:28

## 2022-06-09 RX ADMIN — SODIUM BICARBONATE 650 MG TABLET 650 MG: at 21:27

## 2022-06-09 RX ADMIN — INSULIN GLARGINE 60 UNITS: 100 INJECTION, SOLUTION SUBCUTANEOUS at 10:21

## 2022-06-09 RX ADMIN — Medication 9 MG: at 21:27

## 2022-06-09 RX ADMIN — Medication 1.6 ML: at 14:17

## 2022-06-09 RX ADMIN — FUROSEMIDE 80 MG: 40 TABLET ORAL at 17:11

## 2022-06-09 RX ADMIN — SODIUM BICARBONATE 650 MG TABLET 650 MG: at 15:12

## 2022-06-09 RX ADMIN — INSULIN LISPRO 4 UNITS: 100 INJECTION, SOLUTION INTRAVENOUS; SUBCUTANEOUS at 21:27

## 2022-06-09 RX ADMIN — TRAZODONE HYDROCHLORIDE 75 MG: 50 TABLET ORAL at 21:26

## 2022-06-09 RX ADMIN — BUSPIRONE HYDROCHLORIDE 10 MG: 10 TABLET ORAL at 10:20

## 2022-06-09 RX ADMIN — INSULIN LISPRO 4 UNITS: 100 INJECTION, SOLUTION INTRAVENOUS; SUBCUTANEOUS at 17:11

## 2022-06-09 RX ADMIN — FEBUXOSTAT 40 MG: 40 TABLET, FILM COATED ORAL at 10:24

## 2022-06-09 RX ADMIN — ANTI-FUNGAL POWDER MICONAZOLE NITRATE TALC FREE: 1.42 POWDER TOPICAL at 10:21

## 2022-06-09 RX ADMIN — BUSPIRONE HYDROCHLORIDE 10 MG: 10 TABLET ORAL at 15:11

## 2022-06-09 RX ADMIN — HEPARIN SODIUM 5000 UNITS: 5000 INJECTION INTRAVENOUS; SUBCUTANEOUS at 21:26

## 2022-06-09 RX ADMIN — DOCUSATE SODIUM 100 MG: 100 CAPSULE, LIQUID FILLED ORAL at 21:27

## 2022-06-09 RX ADMIN — CIPROFLOXACIN 400 MG: 2 INJECTION, SOLUTION INTRAVENOUS at 21:42

## 2022-06-09 RX ADMIN — SODIUM CHLORIDE, PRESERVATIVE FREE 10 ML: 5 INJECTION INTRAVENOUS at 10:23

## 2022-06-09 RX ADMIN — ATORVASTATIN CALCIUM 80 MG: 80 TABLET, FILM COATED ORAL at 10:20

## 2022-06-09 RX ADMIN — FUROSEMIDE 80 MG: 40 TABLET ORAL at 10:20

## 2022-06-09 RX ADMIN — DOCUSATE SODIUM 100 MG: 100 CAPSULE, LIQUID FILLED ORAL at 10:20

## 2022-06-09 RX ADMIN — RANOLAZINE 1000 MG: 500 TABLET, EXTENDED RELEASE ORAL at 21:26

## 2022-06-09 RX ADMIN — NYSTATIN OINTMENT: 100000 OINTMENT TOPICAL at 10:21

## 2022-06-09 RX ADMIN — ASPIRIN 81 MG: 81 TABLET, CHEWABLE ORAL at 10:20

## 2022-06-09 RX ADMIN — TAMSULOSIN HYDROCHLORIDE 0.4 MG: 0.4 CAPSULE ORAL at 10:20

## 2022-06-09 NOTE — PROGRESS NOTES
Spoke with sister Savannah Rowland about patient condition. Hailey requests referrals to Urology and Endocrinology for recurring UTI and uncontrolled blood sugar.

## 2022-06-09 NOTE — PROGRESS NOTES
Writer called and spoke to floor DEDE Esparza at 09:55 and requested patient be brought to Dialysis suite for HD treatment. Writer called 2nd time at 10:29 and spoke to PCU floor and asked that patient be brought to Dialysis suite for HD treatment.

## 2022-06-09 NOTE — PROGRESS NOTES
FAMILY MEDICINE  - PROGRESS NOTE    Date:  6/9/2022  Chasity Freeman  [id number]      Chief Complaint   Patient presents with    Hyperglycemia         Interval History:  Improved, she states that she is starting to feel better. No significant events overnight. Specialists notes, labs & imaging reviewed. Please let the patient and sister know that Endocrinology does not do inpatient rounds.       Subjective  Constitutional: positive for obesity  Cardiovascular: positive for lower extremity edema  Hematologic/lymphatic: positive for pallor  Behavioral/Psych: positive for anxiety  Endocrine: positive for diabetic symptoms including hyperglycemia:    Objective:    BP (!) 113/50   Pulse 66   Temp 98.2 °F (36.8 °C) (Oral)   Resp 18   Ht 5' 5\" (1.651 m)   Wt 227 lb 11.8 oz (103.3 kg)   SpO2 92%   BMI 37.90 kg/m²   General appearance - alert, well appearing, and in no distress and overweight  Mental status - alert, oriented to person, place, and time  Eyes - pupils equal and reactive, extraocular eye movements intact  Ears - hearing grossly normal bilaterally  Nose - normal and patent, no erythema, discharge or polyps  Mouth - mucous membranes moist, pharynx normal without lesions  Neck - supple, no significant adenopathy  Lymphatics - no palpable lymphadenopathy, no hepatosplenomegaly  Chest - clear to auscultation, no wheezes, rales or rhonchi, symmetric air entry  Heart - normal rate, regular rhythm, normal S1, S2, no murmurs, rubs, clicks or gallops  Abdomen - soft, nontender, nondistended, no masses or organomegaly  Breasts - not examined  Back exam - not examined  Neurological - alert, oriented, normal speech, no focal findings or movement disorder noted  Musculoskeletal - no joint tenderness, deformity or swelling  Extremities - pedal edema trace +  Skin - normal coloration and turgor, no rashes, no suspicious skin lesions noted    Data:   Medications: Current Facility-Administered Medications   Medication Dose Route Frequency Provider Last Rate Last Admin    insulin glargine (LANTUS) injection vial 60 Units  60 Units SubCUTAneous QAM Callum Davis MD        nystatin (MYCOSTATIN) ointment   Topical BID Callum Davis MD   Given at 06/08/22 2133    magnesium sulfate 2000 mg in water 50 mL IVPB  2,000 mg IntraVENous Once Jj Merida,         sodium chloride flush 0.9 % injection 5-40 mL  5-40 mL IntraVENous 2 times per day Jj Merida DO   10 mL at 06/08/22 2129    sodium chloride flush 0.9 % injection 5-40 mL  5-40 mL IntraVENous PRN Daniel Shaikh DO        0.9 % sodium chloride infusion   IntraVENous PRN Jj Merida DO        acetaminophen (TYLENOL) tablet 650 mg  650 mg Oral Q4H PRN Jj Merida, DO   650 mg at 06/08/22 2207    ondansetron (ZOFRAN-ODT) disintegrating tablet 4 mg  4 mg Oral Q8H PRN Jj Merida, DO   4 mg at 06/08/22 9290    Or    ondansetron (ZOFRAN) injection 4 mg  4 mg IntraVENous Q6H PRN Jj Merida, DO        heparin (porcine) injection 5,000 Units  5,000 Units SubCUTAneous 3 times per day Jj Merida DO   5,000 Units at 06/09/22 0601    cefTRIAXone (ROCEPHIN) 1000 mg IVPB in NS 50ml minibag  1,000 mg IntraVENous Once Jj Merida,         aspirin chewable tablet 81 mg  81 mg Oral Daily Callum Davis MD   81 mg at 06/08/22 0936    ranolazine (RANEXA) extended release tablet 1,000 mg  1,000 mg Oral BID Callum Davis MD   1,000 mg at 06/08/22 2118    busPIRone (BUSPAR) tablet 10 mg  10 mg Oral TID Callum Davis MD   10 mg at 06/08/22 2118    pantoprazole (PROTONIX) tablet 40 mg  40 mg Oral QAM AC Callum Davis MD   40 mg at 06/09/22 0601    atorvastatin (LIPITOR) tablet 80 mg  80 mg Oral Daily Callum Davis MD   80 mg at 06/08/22 0935    febuxostat (ULORIC) tablet 40 mg  40 mg Oral Daily Callum Davis MD   40 mg at 06/08/22 0929    docusate sodium (COLACE) capsule 100 mg  100 mg Oral BID Iona Gomes MD   100 mg at 06/08/22 2118    tamsulosin (FLOMAX) capsule 0.4 mg  0.4 mg Oral Daily Iona Gomes MD   0.4 mg at 06/08/22 9818    sodium bicarbonate tablet 650 mg  650 mg Oral TID Iona Gomes MD   650 mg at 06/08/22 2123    furosemide (LASIX) tablet 80 mg  80 mg Oral BID Iona Gomes MD   80 mg at 06/08/22 1706    traZODone (DESYREL) tablet 75 mg  75 mg Oral Nightly Iona Gomes MD   75 mg at 06/08/22 2117    melatonin tablet 9 mg  9 mg Oral Nightly Iona Gomes MD   9 mg at 06/08/22 2123    lidocaine-prilocaine (EMLA) cream   Topical PRN Iona Gomes MD        carvedilol (COREG) tablet 3.125 mg  3.125 mg Oral BID Iona Gomes MD   3.125 mg at 06/08/22 2123    miconazole (MICOTIN) 2 % powder   Topical BID Iona Gomes MD   Given at 06/08/22 2133    insulin lispro (HUMALOG) injection vial 0-12 Units  0-12 Units SubCUTAneous TID WC Iona Gomes MD   4 Units at 06/08/22 1706    insulin lispro (HUMALOG) injection vial 0-6 Units  0-6 Units SubCUTAneous Nightly Iona Gomes MD   4 Units at 06/08/22 2124    glucose chewable tablet 16 g  4 tablet Oral PRN Iona Gomes MD        dextrose bolus 10% 125 mL  125 mL IntraVENous PRN Iona Gomes MD        Or    dextrose bolus 10% 250 mL  250 mL IntraVENous PRN Iona Gomes MD        glucagon (rDNA) injection 1 mg  1 mg IntraMUSCular PRN Iona Gomes MD        dextrose 5 % solution  100 mL/hr IntraVENous PRN Iona Gomes MD        ciprofloxacin (CIPRO) IVPB 400 mg  400 mg IntraVENous Q12H Iona Gomes MD   Stopped at 06/08/22 2232       Intake/Output Summary (Last 24 hours) at 6/9/2022 0737  Last data filed at 6/9/2022 0504  Gross per 24 hour   Intake 560 ml   Output 300 ml   Net 260 ml     Recent Results (from the past 24 hour(s))   POC Glucose Fingerstick    Collection Time: 06/08/22 11:08 AM   Result Value Ref Range    POC Glucose 408 (HH) 65 - 105 mg/dL   POC Glucose Fingerstick    Collection Time: 06/08/22  4:03 PM   Result Value Ref Range    POC Glucose 246 (H) 65 - 105 mg/dL   CBC    Collection Time: 06/09/22  5:33 AM   Result Value Ref Range    WBC 3.5 3.5 - 11.0 k/uL    RBC 2.80 (L) 4.0 - 5.2 m/uL    Hemoglobin 8.7 (L) 12.0 - 16.0 g/dL    Hematocrit 26.4 (L) 36 - 46 %    MCV 94.5 80 - 100 fL    MCH 31.0 26 - 34 pg    MCHC 32.8 31 - 37 g/dL    RDW 18.6 (H) 11.5 - 14.9 %    Platelets 978 198 - 322 k/uL    MPV 8.0 6.0 - 12.0 fL   Comprehensive Metabolic Panel    Collection Time: 06/09/22  5:33 AM   Result Value Ref Range    Glucose 258 (H) 70 - 99 mg/dL    BUN 21 8 - 23 mg/dL    CREATININE 2.57 (H) 0.50 - 0.90 mg/dL    Calcium 8.7 8.6 - 10.4 mg/dL    Sodium 133 (L) 135 - 144 mmol/L    Potassium 3.9 3.7 - 5.3 mmol/L    Chloride 98 98 - 107 mmol/L    CO2 26 20 - 31 mmol/L    Anion Gap 9 9 - 17 mmol/L    Alkaline Phosphatase 90 35 - 104 U/L    ALT 11 5 - 33 U/L    AST 14 <32 U/L    Total Bilirubin 0.61 0.3 - 1.2 mg/dL    Total Protein 6.1 (L) 6.4 - 8.3 g/dL    Albumin 2.8 (L) 3.5 - 5.2 g/dL    GFR Non- 19 (L) >60 mL/min    GFR  23 (L) >60 mL/min    GFR Comment         POC Glucose Fingerstick    Collection Time: 06/09/22  6:39 AM   Result Value Ref Range    POC Glucose 288 (H) 65 - 105 mg/dL     -----------------------------------------------------------------  RAD:  EKG:  Micro:     Assessment & Plan:    Patient Active Problem List:     Atherosclerosis of coronary artery bypass graft of native heart without angina pectoris     Chronic diastolic heart failure (HCC)     Diabetic polyneuropathy associated with type 2 diabetes mellitus (HCC)     History of coronary artery bypass graft     Iron deficiency anemia     Spinal stenosis of lumbar region with neurogenic claudication     Mixed hyperlipidemia     Type 2 diabetes 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, with long-term current use of insulin (HCC)     Obesity, Class II, BMI 35-39.9     Thyroid nodule greater than or equal to 1 cm in diameter incidentally noted on imaging study     Essential hypertension     Chronic ischemic heart disease     Ischemic stroke of frontal lobe (HCC)     Morbid obesity with BMI of 40.0-44.9, adult (HCC)     Disequilibrium syndrome     Generalized weakness     Long-term memory loss     Muscle right arm weakness     Anxiety     Chronic midline low back pain with bilateral sciatica     Tremor     COVID     End-stage renal disease ([facility] [address]     Diabetic ketoacidosis without coma associated with type 2 diabetes mellitus (HCC)     Hypokalemia     Confusion     Myoclonic jerking     Hyperglycemia           Plan:  -Hyperglycemia without DKA - BS's elevated today, increase Lantus to 40 units BID. -ESRD on dialysis - Nephrology managing, HD on T,TH,SAT. -Recurring UTI - consult Urology, continue IV Cipro. -D/C plan is home with [facility]. -Continue current treatments.  -Complete orders per chart.     See orders   Disposition:    Electronically signed by Orlene Hatchet, MD on 6/9/2022 at 7:37 AM

## 2022-06-09 NOTE — CARE COORDINATION
ONGOING DISCHARGE PLAN:    Patient is alert and oriented x4. Spoke with patient regarding discharge plan and patient confirms that plan is still to return to home.     Pt. Will continue w/ [facility].      Pt. Still having elevated Blood Sugars in the 300's Today.      Pt. Had Dialysis today. Nephro following. Remains on IV Cipro. Will continue to follow for additional discharge needs.     Electronically signed by Juliana Johns RN on 6/9/2022 at 2:20 PM

## 2022-06-09 NOTE — PROGRESS NOTES
NEPHROLOGY PROGRESS NOTE    Patient :  Alecia Pagan; 57 y.o. MRN# [id number]  Location:  2106/2106-01  Attending: Fidelia Ball MD  Admit Date:  6/6/2022   Hospital Day: 3      Reason for Consult: ESRD/hemodialysis      Chief Complaint: Uncontrolled diabetes    Subjective/interval history. Patient seen and examined, denies any new complaints, no acute events overnight  Blood pressure stable. Patient is seen on hemodialysis today with a goal UF of 1.5-2 kg -Patient has lower extremity edema and respiratory status is stable. Patient is below her estimated dry weight. History of Present Illness: This is a 57 y.o. female with hypertension, type 2 diabetes mellitus, insulin-dependent diabetes mellitus, end-stage renal disease on hemodialysis Tuesday Thursday Saturday at [address] [facility].   Patient presented to the hospital with complaints of uncontrolled high sugars her sugars were above 400, patient was not found in DKA, anion gap was 11  Patient denied any other complaint no nausea vomiting no diarrhea no chest pain or shortness of breath  Patient had dialysis on Saturday      Past Medical History:        Diagnosis Date    Backache, unspecified     CHF (congestive heart failure) ([facility] [address]     Chronic kidney disease     Coronary atherosclerosis of artery bypass graft     COVID 1/31/2022    Cramp of limb     Gallstones     Hypertension     Insomnia     Pneumonia     Type II or unspecified type diabetes mellitus with renal manifestations, not stated as uncontrolled(250.40)     Type II or unspecified type diabetes mellitus without mention of complication, not stated as uncontrolled     Unspecified vitamin D deficiency        Past Surgical History:        Procedure Laterality Date    ANKLE FRACTURE SURGERY      AV FISTULA CREATION  12/14/2021    BREAST SURGERY      CARPAL TUNNEL RELEASE      CHOLECYSTECTOMY, OPEN N/A     CORONARY ARTERY BYPASS GRAFT      x3    DIALYSIS FISTULA CREATION Left 12/14/2021    LEFT AV FISTULA CREATION UPPER EXTREMITY performed by Heriberto Cervantes MD at [address] [facility] IR TUNNELED CATHETER PLACEMENT GREATER THAN 5 YEARS  8/18/2021    IR TUNNELED CATHETER PLACEMENT GREATER THAN 5 YEARS 8/18/2021 ST SPECIAL PROCEDURES    KNEE ARTHROSCOPY      right    OTHER SURGICAL HISTORY  04/06/2022    cvc exchange    TONSILLECTOMY         Current Medications:    anticoagulant sodium citrate 4 % injection 1.6 mL, PRN  anticoagulant sodium citrate 4 % injection 1.6 mL, PRN  insulin glargine (LANTUS) injection vial 60 Units, QAM  nystatin (MYCOSTATIN) ointment, BID  magnesium sulfate 2000 mg in water 50 mL IVPB, Once  sodium chloride flush 0.9 % injection 5-40 mL, 2 times per day  sodium chloride flush 0.9 % injection 5-40 mL, PRN  0.9 % sodium chloride infusion, PRN  acetaminophen (TYLENOL) tablet 650 mg, Q4H PRN  ondansetron (ZOFRAN-ODT) disintegrating tablet 4 mg, Q8H PRN   Or  ondansetron (ZOFRAN) injection 4 mg, Q6H PRN  heparin (porcine) injection 5,000 Units, 3 times per day  cefTRIAXone (ROCEPHIN) 1000 mg IVPB in NS 50ml minibag, Once  aspirin chewable tablet 81 mg, Daily  ranolazine (RANEXA) extended release tablet 1,000 mg, BID  busPIRone (BUSPAR) tablet 10 mg, TID  pantoprazole (PROTONIX) tablet 40 mg, QAM AC  atorvastatin (LIPITOR) tablet 80 mg, Daily  febuxostat (ULORIC) tablet 40 mg, Daily  docusate sodium (COLACE) capsule 100 mg, BID  tamsulosin (FLOMAX) capsule 0.4 mg, Daily  sodium bicarbonate tablet 650 mg, TID  furosemide (LASIX) tablet 80 mg, BID  traZODone (DESYREL) tablet 75 mg, Nightly  melatonin tablet 9 mg, Nightly  lidocaine-prilocaine (EMLA) cream, PRN  carvedilol (COREG) tablet 3.125 mg, BID  miconazole (MICOTIN) 2 % powder, BID  insulin lispro (HUMALOG) injection vial 0-12 Units, TID WC  insulin lispro (HUMALOG) injection vial 0-6 Units, Nightly  glucose chewable tablet 16 g, PRN  dextrose bolus 10% 125 mL, PRN   Or  dextrose bolus 10% 250 mL, PRN  glucagon (rDNA) injection 1 mg, PRN  dextrose 5 % solution, PRN  ciprofloxacin (CIPRO) IVPB 400 mg, Q12H        Allergies:  Adhesive tape, Ace inhibitors, Iv dye [iodides], Nsaids, and Metformin and related    Social History:   Social History     Socioeconomic History    Marital status: Single     Spouse name: Not on file    Number of children: Not on file    Years of education: Not on file    Highest education level: Not on file   Occupational History    Not on file   Tobacco Use    Smoking status: Never Smoker    Smokeless tobacco: Never Used   Vaping Use    Vaping Use: Never used   Substance and Sexual Activity    Alcohol use: No    Drug use: No    Sexual activity: Not Currently   Other Topics Concern    Not on file   Social History Narrative    Not on file     Social Determinants of Health     Financial Resource Strain: Low Risk     Difficulty of Paying Living Expenses: Not very hard   Food Insecurity: No Food Insecurity    Worried About Running Out of Food in the Last Year: Never true    Nany of Food in the Last Year: Never true   Transportation Needs: No Transportation Needs    Lack of Transportation (Medical): No    Lack of Transportation (Non-Medical): No   Physical Activity: Inactive    Days of Exercise per Week: 0 days    Minutes of Exercise per Session: 0 min   Stress: Stress Concern Present    Feeling of Stress :  To some extent   Social Connections: Socially Isolated    Frequency of Communication with Friends and Family: More than three times a week    Frequency of Social Gatherings with Friends and Family: More than three times a week    Attends Anabaptism Services: Never    Active Member of Clubs or Organizations: No    Attends Club or Organization Meetings: Never    Marital Status: Never    Intimate Partner Violence:     Fear of Current or Ex-Partner: Not on file    Emotionally Abused: Not on file    Physically Abused: Not on file   [facility] Sexually Abused: Not on file   Housing Stability: Low Risk     Unable to Pay for Housing in the Last Year: No    Number of Places Lived in the Last Year: 1    Unstable Housing in the Last Year: No           Objective:  CURRENT TEMPERATURE:  Temp: 98 °F (36.7 °C)  MAXIMUM TEMPERATURE OVER 24HRS:  Temp (24hrs), Av °F (36.7 °C), Min:97.7 °F (36.5 °C), Max:98.2 °F (36.8 °C)    CURRENT RESPIRATORY RATE:  Resp: 18  CURRENT PULSE:  Heart Rate: 72  CURRENT BLOOD PRESSURE:  BP: (!) 144/62  24HR BLOOD PRESSURE RANGE:  Systolic (08JQM), BKR:171 , Min:113 , ASI:643   ; Diastolic (21UDZ), FHB:45, Min:34, Max:62    24HR INTAKE/OUTPUT:      Intake/Output Summary (Last 24 hours) at 2022 1323  Last data filed at 2022 0504  Gross per 24 hour   Intake 560 ml   Output 300 ml   Net 260 ml     Patient Vitals for the past 96 hrs (Last 3 readings):   Weight   22 1039 227 lb 11.8 oz (103.3 kg)   22 0400 227 lb 11.8 oz (103.3 kg)   22 0000 221 lb 5.5 oz (100.4 kg)       Physical Exam:  GENERAL APPEARANCE: Alert and cooperative, and appears to be in no acute distress. HEAD: normocephalic  EYES: EOMI. Not pale, anicteric   NOSE:  No nasal discharge. THROAT:  Oral cavity and pharynx normal. Moist  CARDIAC:  Rhythm is regular. LUNGS: Normal respiratory effort no accessory muscle use  NECK: Neck supple, non-tender   GI-soft nontender  MUSKULOSKELETAL: Adequately aligned spine. No joint erythema or tenderness. EXTREMITIES: No edema. Peripheral pulses intact.    NEURO: Nonfocal    Labs:   CBC:  Recent Labs     22  0510 22  0558 22  0533   WBC 3.9 5.1 3.5   RBC 2.75* 2.78* 2.80*   HGB 8.6* 8.8* 8.7*   HCT 26.5* 25.9* 26.4*   MCV 96.3 93.5 94.5   MCH 31.1 31.7 31.0   MCHC 32.3 33.9 32.8   RDW 17.8* 17.8* 18.6*    139* 156   MPV 8.5 8.3 8.0      BMP:   Recent Labs     22  0510 22  0558 22  0533    134* 133*   K 3.6* 4.1 3.9    98 98   CO2 25 28 26   BUN 21 15 21 CREATININE 2.28* 2.04* 2.57*   GLUCOSE 171* 325* 258*   CALCIUM 8.5* 8.5* 8.7      Phosphorus:  No results for input(s): PHOS in the last 72 hours. Magnesium: No results for input(s): MG in the last 72 hours. Albumin:   Recent Labs     06/07/22  0510 06/08/22  0558 06/09/22  0533   LABALBU 2.7* 3.1* 2.8*       IRON:  No results for input(s): IRON in the last 72 hours. Iron Saturation:  Invalid input(s): PERCENTFE  TIBC:  No results for input(s): TIBC in the last 72 hours. FERRITIN:  No results for input(s): FERRITIN in the last 72 hours. SPEP: No results for input(s): SPEP in the last 72 hours. Recent Labs     06/09/22  0533   PROT 6.1*     UPEP: No results for input(s): TPU in the last 72 hours. Urine Sodium:  No results for input(s): JASON in the last 72 hours. Urine Potassium: No results for input(s): KUR in the last 72 hours. Urine Chloride:  No results for input(s): CLU in the last 72 hours. Urine Ph:  Invalid input(s): PO4U  Urine Osmolarity: No results for input(s): OSMOU in the last 72 hours. Urine Creatinine:  No results for input(s): LABCREA in the last 72 hours. Urine Eosinophils: Invalid input(s): EOSU  Urine Protein:  No results for input(s): TPU in the last 72 hours. Urinalysis:    Recent Labs     06/06/22  1705   NITRU NEGATIVE   COLORU Yellow   PHUR 6.5   WBCUA 21 TO 50   RBCUA 6 TO 9   MUCUS 1+*   BACTERIA MANY*   SPECGRAV 1.017   LEUKOCYTESUR MOD*   UROBILINOGEN Normal   [employer] NEGATIVE   AMORPHOUS 1+*         Radiology:  Reviewed as available. Assessment:    1.  End-stage renal disease - on hemodialysis by way of a right-sided tunneled catheter  We will maintain TTS hemodialysis schedule. Renal diet,i.e 2-gram sodium,2-gram potassium,1500 ml fluid restriction,1-gram phosphorus, 1800 KCal and 1.2 gram protein per day.     2.  Systemic hypertension - controlled    3.   Type 2 diabetes insulin-dependent diabetes mellitus uncontrolled blood sugars on insulin- diabetes management per primary team     3.  Normocytic anemia of chronic kidney disease - Hemoglobin  stable, continue with Retacrit 3003 times a week with dialysis     4. Mineral and bone disease profile -controlled     5. UTI with Klebsiella and IV Ciprofloxacin. Plan:  Continue hemodialysis Tuesday Thursday Saturday-Attempt UF of 2 kg  Diabetes management per primary team  Change her estimated dry weight to her post weight today -continue to gently challenge due to edema. Thank you for the consultation.       Electronically signed by Angie Jefferson MD on 6/9/2022 at 1:23 PM

## 2022-06-09 NOTE — PROGRESS NOTES
HEMODIALYSIS POST TREATMENT NOTE    Treatment time ordered: 3.5Hrs  Actual treatment time: 3.5Hrs    UltraFiltration Goal: 2Kgs  UltraFiltration Removed: 2Kgs      Pre Treatment weight: 103.3Kgs  Post Treatment weight: 101.3Kgs  Estimated Dry Weight: 101.3Kgs Changed from previous EDW of 109Kgs to post trx weight today per Dr. Vicente Pulido and continue to challenge    Access used:     Central Venous Catheter:          Tunneled or Non-tunneled: Tunneled           Site: R chest          Access Flow: Good      Internal Access:       AV Fistula or AV Graft: N/A         Site: N/A       Access Flow: N/A       Sign and symptoms of infection: No       If YES: N/A    Medications or blood products given: N/A    Chronic outpatient schedule: [facility]     Chronic outpatient unit: [facility]    Summary of response to treatment: Tolerated fairly well with no issues throughout trx. Tolerated 2Kgs of fluid removal. EDW Changed from previous EDW of 109Kgs to post trx weight today per Dr. Vicente Pulido and continue to challenge. Post weight today 101.3Kgs    Explain if orders NOT met, was physician notified:N/A      ACES flowsheet faxed to patient unit/ placed in patient chart: Yes    Post assessment completed: Yes by Raeann Denson RN    Report given to: Charlie documented Safety Checks include the followin) Access and face visible at all times. 2) All connections and blood lines are secure with no kinks. 3) NVL alarm engaged. 4) Hemosafe device applied (if applicable). 5) No collapse of Arterial or Venous blood chambers. 6) All blood lines / pump segments in the air detectors.

## 2022-06-09 NOTE — PROGRESS NOTES
[address] [facility]   OCCUPATIONAL THERAPY MISSED TREATMENT NOTE   INPATIENT   Date: 22  Patient Name: Ludwig Ovalle       Room:   MRN: [id number]   Account #: [de-identified]    : 1960  (57 y.o.)  Gender: female   Referring Practitioner: Elias Salcedo MD  Diagnosis: Hyperglycemia             REASON FOR MISSED TREATMENT:  Patient at testing and/or off the floor   -   Hemodialysis         Arpan Golden OT

## 2022-06-09 NOTE — PLAN OF CARE
Problem: Discharge Planning  Goal: Discharge to home or other facility with appropriate resources  Outcome: Progressing     Problem: Safety - Adult  Goal: Free from fall injury  6/9/2022 1713 by Domenica Reynaga, RN  Outcome: Progressing  Flowsheets (Taken 6/9/2022 6944)  Free From Fall Injury: Instruct family/caregiver on patient safety     Problem: Nutrition Deficit:  Goal: Optimize nutritional status  Outcome: Progressing     Problem: Pain  Goal: Verbalizes/displays adequate comfort level or baseline comfort level  6/9/2022 1713 by Domenica Reynaga, RN  Outcome: Progressing

## 2022-06-09 NOTE — PROGRESS NOTES
HEMODIALYSIS PRE-TREATMENT NOTE    Patient Identifiers prior to treatment: Name//MRN    Isolation Required:  Yes                      Isolation Type: Contact       (please document if patient is being managed as a PUI/COVID-19 patient)        Hepatitis status:                           Date Drawn                             Result  Hepatitis B Surface Antigen 2022     NEG                     Hepatitis B Surface Antibody 2022 POS     60.08   Hepatitis B Core Antibody N/A N/A          How was Hepatitis Status verified: Epic chart     Was a copy of the labs you documented provided to facility for the patient's chart: yes    Hemodialysis orders verified: yes    Access Within normal limits ( I.e. s/s of infection,...): yes     Pre-Assessment completed: yes by Wilmer Haywood RN    Pre-dialysis report received from: Vilma                      Time: 09:55

## 2022-06-09 NOTE — PROGRESS NOTES
Physical Therapy        Physical Therapy Cancel Note      DATE: 2022    NAME: Ag Maria  MRN: [id number]   : 1960      Patient not seen this date for Physical Therapy due to:    Hemodialysis: Mazin Castaneda RN at 26 [phone] - Patient still in dialysis.        Electronically signed by Joslyn Campbell PTA on 2022 at 3:29 PM

## 2022-06-10 ENCOUNTER — APPOINTMENT (OUTPATIENT)
Dept: CT IMAGING | Age: 62
DRG: 637 | End: 2022-06-10
Payer: COMMERCIAL

## 2022-06-10 LAB
ALBUMIN SERPL-MCNC: 3.1 G/DL (ref 3.5–5.2)
ALP BLD-CCNC: 94 U/L (ref 35–104)
ALT SERPL-CCNC: 11 U/L (ref 5–33)
ANION GAP SERPL CALCULATED.3IONS-SCNC: 9 MMOL/L (ref 9–17)
AST SERPL-CCNC: 16 U/L
BILIRUB SERPL-MCNC: 0.5 MG/DL (ref 0.3–1.2)
BUN BLDV-MCNC: 13 MG/DL (ref 8–23)
CALCIUM SERPL-MCNC: 8.8 MG/DL (ref 8.6–10.4)
CHLORIDE BLD-SCNC: 99 MMOL/L (ref 98–107)
CO2: 29 MMOL/L (ref 20–31)
CREAT SERPL-MCNC: 1.99 MG/DL (ref 0.5–0.9)
GFR AFRICAN AMERICAN: 31 ML/MIN
GFR NON-AFRICAN AMERICAN: 25 ML/MIN
GFR SERPL CREATININE-BSD FRML MDRD: ABNORMAL ML/MIN/{1.73_M2}
GLUCOSE BLD-MCNC: 215 MG/DL (ref 65–105)
GLUCOSE BLD-MCNC: 231 MG/DL (ref 70–99)
GLUCOSE BLD-MCNC: 257 MG/DL (ref 65–105)
GLUCOSE BLD-MCNC: 312 MG/DL (ref 65–105)
GLUCOSE BLD-MCNC: 329 MG/DL (ref 65–105)
HCT VFR BLD CALC: 26.7 % (ref 36–46)
HEMOGLOBIN: 8.9 G/DL (ref 12–16)
MCH RBC QN AUTO: 31.1 PG (ref 26–34)
MCHC RBC AUTO-ENTMCNC: 33.2 G/DL (ref 31–37)
MCV RBC AUTO: 93.7 FL (ref 80–100)
PDW BLD-RTO: 18.4 % (ref 11.5–14.9)
PLATELET # BLD: 162 K/UL (ref 150–450)
PMV BLD AUTO: 8.1 FL (ref 6–12)
POTASSIUM SERPL-SCNC: 4.1 MMOL/L (ref 3.7–5.3)
RBC # BLD: 2.84 M/UL (ref 4–5.2)
SODIUM BLD-SCNC: 137 MMOL/L (ref 135–144)
TOTAL PROTEIN: 6.1 G/DL (ref 6.4–8.3)
WBC # BLD: 3.9 K/UL (ref 3.5–11)

## 2022-06-10 PROCEDURE — 85027 COMPLETE CBC AUTOMATED: CPT

## 2022-06-10 PROCEDURE — 2060000000 HC ICU INTERMEDIATE R&B

## 2022-06-10 PROCEDURE — 97110 THERAPEUTIC EXERCISES: CPT

## 2022-06-10 PROCEDURE — 36415 COLL VENOUS BLD VENIPUNCTURE: CPT

## 2022-06-10 PROCEDURE — 82947 ASSAY GLUCOSE BLOOD QUANT: CPT

## 2022-06-10 PROCEDURE — 6360000002 HC RX W HCPCS: Performed by: FAMILY MEDICINE

## 2022-06-10 PROCEDURE — 6370000000 HC RX 637 (ALT 250 FOR IP): Performed by: FAMILY MEDICINE

## 2022-06-10 PROCEDURE — 97530 THERAPEUTIC ACTIVITIES: CPT

## 2022-06-10 PROCEDURE — 6360000002 HC RX W HCPCS: Performed by: STUDENT IN AN ORGANIZED HEALTH CARE EDUCATION/TRAINING PROGRAM

## 2022-06-10 PROCEDURE — 74176 CT ABD & PELVIS W/O CONTRAST: CPT

## 2022-06-10 PROCEDURE — 80053 COMPREHEN METABOLIC PANEL: CPT

## 2022-06-10 PROCEDURE — 2580000003 HC RX 258: Performed by: STUDENT IN AN ORGANIZED HEALTH CARE EDUCATION/TRAINING PROGRAM

## 2022-06-10 PROCEDURE — 99232 SBSQ HOSP IP/OBS MODERATE 35: CPT | Performed by: FAMILY MEDICINE

## 2022-06-10 RX ORDER — CIPROFLOXACIN 500 MG/1
500 TABLET, FILM COATED ORAL 2 TIMES DAILY
Qty: 14 TABLET | Refills: 0 | Status: SHIPPED | OUTPATIENT
Start: 2022-06-10 | End: 2022-06-17

## 2022-06-10 RX ORDER — INSULIN GLARGINE 100 [IU]/ML
50 INJECTION, SOLUTION SUBCUTANEOUS 2 TIMES DAILY
Qty: 5 PEN | Refills: 3 | Status: SHIPPED | OUTPATIENT
Start: 2022-06-10 | End: 2022-06-11 | Stop reason: SDUPTHER

## 2022-06-10 RX ORDER — INSULIN GLARGINE 100 [IU]/ML
50 INJECTION, SOLUTION SUBCUTANEOUS 2 TIMES DAILY WITH MEALS
Status: DISCONTINUED | OUTPATIENT
Start: 2022-06-10 | End: 2022-06-12 | Stop reason: HOSPADM

## 2022-06-10 RX ORDER — INSULIN GLARGINE 100 [IU]/ML
50 INJECTION, SOLUTION SUBCUTANEOUS 2 TIMES DAILY
Status: DISCONTINUED | OUTPATIENT
Start: 2022-06-10 | End: 2022-06-10

## 2022-06-10 RX ORDER — INSULIN GLARGINE 100 [IU]/ML
45 INJECTION, SOLUTION SUBCUTANEOUS 2 TIMES DAILY
Status: ON HOLD | COMMUNITY
End: 2022-06-10 | Stop reason: HOSPADM

## 2022-06-10 RX ADMIN — ASPIRIN 81 MG: 81 TABLET, CHEWABLE ORAL at 10:39

## 2022-06-10 RX ADMIN — HEPARIN SODIUM 5000 UNITS: 5000 INJECTION INTRAVENOUS; SUBCUTANEOUS at 15:08

## 2022-06-10 RX ADMIN — INSULIN GLARGINE 60 UNITS: 100 INJECTION, SOLUTION SUBCUTANEOUS at 10:41

## 2022-06-10 RX ADMIN — TAMSULOSIN HYDROCHLORIDE 0.4 MG: 0.4 CAPSULE ORAL at 10:40

## 2022-06-10 RX ADMIN — FEBUXOSTAT 40 MG: 40 TABLET, FILM COATED ORAL at 11:42

## 2022-06-10 RX ADMIN — BUSPIRONE HYDROCHLORIDE 10 MG: 10 TABLET ORAL at 20:08

## 2022-06-10 RX ADMIN — PANTOPRAZOLE SODIUM 40 MG: 40 TABLET, DELAYED RELEASE ORAL at 05:59

## 2022-06-10 RX ADMIN — DOCUSATE SODIUM 100 MG: 100 CAPSULE, LIQUID FILLED ORAL at 10:38

## 2022-06-10 RX ADMIN — CARVEDILOL 3.12 MG: 3.12 TABLET, FILM COATED ORAL at 20:07

## 2022-06-10 RX ADMIN — NYSTATIN OINTMENT: 100000 OINTMENT TOPICAL at 11:43

## 2022-06-10 RX ADMIN — CARVEDILOL 3.12 MG: 3.12 TABLET, FILM COATED ORAL at 10:38

## 2022-06-10 RX ADMIN — HEPARIN SODIUM 5000 UNITS: 5000 INJECTION INTRAVENOUS; SUBCUTANEOUS at 21:33

## 2022-06-10 RX ADMIN — RANOLAZINE 1000 MG: 500 TABLET, EXTENDED RELEASE ORAL at 10:37

## 2022-06-10 RX ADMIN — SODIUM CHLORIDE, PRESERVATIVE FREE 10 ML: 5 INJECTION INTRAVENOUS at 20:12

## 2022-06-10 RX ADMIN — SODIUM BICARBONATE 650 MG TABLET 650 MG: at 10:38

## 2022-06-10 RX ADMIN — DOCUSATE SODIUM 100 MG: 100 CAPSULE, LIQUID FILLED ORAL at 20:07

## 2022-06-10 RX ADMIN — SODIUM CHLORIDE, PRESERVATIVE FREE 10 ML: 5 INJECTION INTRAVENOUS at 10:44

## 2022-06-10 RX ADMIN — BUSPIRONE HYDROCHLORIDE 10 MG: 10 TABLET ORAL at 10:39

## 2022-06-10 RX ADMIN — ATORVASTATIN CALCIUM 80 MG: 80 TABLET, FILM COATED ORAL at 10:39

## 2022-06-10 RX ADMIN — SODIUM BICARBONATE 650 MG TABLET 650 MG: at 20:08

## 2022-06-10 RX ADMIN — ANTI-FUNGAL POWDER MICONAZOLE NITRATE TALC FREE: 1.42 POWDER TOPICAL at 10:51

## 2022-06-10 RX ADMIN — Medication 9 MG: at 20:08

## 2022-06-10 RX ADMIN — FUROSEMIDE 80 MG: 40 TABLET ORAL at 10:38

## 2022-06-10 RX ADMIN — TRAZODONE HYDROCHLORIDE 75 MG: 50 TABLET ORAL at 20:07

## 2022-06-10 RX ADMIN — BUSPIRONE HYDROCHLORIDE 10 MG: 10 TABLET ORAL at 15:08

## 2022-06-10 RX ADMIN — INSULIN LISPRO 3 UNITS: 100 INJECTION, SOLUTION INTRAVENOUS; SUBCUTANEOUS at 20:06

## 2022-06-10 RX ADMIN — FUROSEMIDE 80 MG: 40 TABLET ORAL at 17:33

## 2022-06-10 RX ADMIN — INSULIN GLARGINE 50 UNITS: 100 INJECTION, SOLUTION SUBCUTANEOUS at 17:46

## 2022-06-10 RX ADMIN — RANOLAZINE 1000 MG: 500 TABLET, EXTENDED RELEASE ORAL at 20:08

## 2022-06-10 RX ADMIN — INSULIN LISPRO 4 UNITS: 100 INJECTION, SOLUTION INTRAVENOUS; SUBCUTANEOUS at 10:40

## 2022-06-10 RX ADMIN — HEPARIN SODIUM 5000 UNITS: 5000 INJECTION INTRAVENOUS; SUBCUTANEOUS at 05:58

## 2022-06-10 RX ADMIN — INSULIN LISPRO 8 UNITS: 100 INJECTION, SOLUTION INTRAVENOUS; SUBCUTANEOUS at 12:12

## 2022-06-10 RX ADMIN — INSULIN LISPRO 8 UNITS: 100 INJECTION, SOLUTION INTRAVENOUS; SUBCUTANEOUS at 17:34

## 2022-06-10 RX ADMIN — SODIUM BICARBONATE 650 MG TABLET 650 MG: at 15:08

## 2022-06-10 RX ADMIN — CIPROFLOXACIN 400 MG: 2 INJECTION, SOLUTION INTRAVENOUS at 20:04

## 2022-06-10 ASSESSMENT — PAIN DESCRIPTION - LOCATION: LOCATION: FOOT

## 2022-06-10 ASSESSMENT — PAIN DESCRIPTION - ORIENTATION: ORIENTATION: RIGHT;LEFT

## 2022-06-10 ASSESSMENT — PAIN SCALES - GENERAL: PAINLEVEL_OUTOF10: 8

## 2022-06-10 NOTE — PROGRESS NOTES
Dr. Marcela Barnard rounded BS at lunch noted at 329. Dr. Marcela Barnard thinks she supposed to be taking more insulin than the 60 u of Lantus daily she's getting here. The pt did show RN a box of Basaglar that showed 45 u BID. The label date was May 2021. RN asked if she still take this med because the last fill date was a yr ago. She stated yes. RN called [address] [place] [address] which is the pharmacy she says she uses.  They stated the pt hasn't picked up any insulin since Jan 2022    RN did notify Dr. Whitley Arreaga

## 2022-06-10 NOTE — PROGRESS NOTES
Physician Progress Note      PATIENT:               Jethro Smith  CSN #:                  [id number]  :                       1960  ADMIT DATE:       2022 2:20 PM  [address] [place] DATE:  RESPONDING  PROVIDER #:        Chandler Saunders MD          QUERY TEXT:    Dr Roland Navarro,    Patient admitted with uncontrolled DM2, noted to have low RBCs, WBC count, and   low platelet count. If possible, please document in progress notes and   discharge summary if you are evaluating and/or treating any of the following: The medical record reflects the following:  Risk Factors: ESRD, DM, CHF, HTN  Clinical Indicators: WBC 3.1, RBC 2.81, HBG 8.7, HCT 26.3,   Treatment: 0.9 NS, Serial labs. Options provided:  -- Medication induced pancytopenia due to Buspar  -- Pancytopenia due to ESRD  -- Pancytopenia due to unknown cause. -- Other - I will add my own diagnosis  -- Disagree - Not applicable / Not valid  -- Disagree - Clinically unable to determine / Unknown  -- Refer to Clinical Documentation Reviewer    PROVIDER RESPONSE TEXT:    Patient has drug induced pancytopenia due to ESRD. Query created by: Jhonny Lott on 2022 9:57 AM      QUERY TEXT:    Dr Roland Navarro,    Pt admitted with uncontrolled DM2 and has ESRD & HBG 8.7 & CHT 26.3. If   possible, please document in progress notes and discharge summary further   specificity regarding the acuity and type of anemia:    The medical record reflects the following:  Risk Factors: ESRD, DM, CHF, HTN  Clinical Indicators: WBC 3.1, RBC 2.81, HBG 8.7, HCT 26.3,   Treatment: 0.9 NS, Serial labs.   Options provided:  -- Anemia due to CKD  -- Anemia due to chronic blood loss  -- Anemia due to iron deficiency  -- Dilutional anemia  -- Other - I will add my own diagnosis  -- Disagree - Not applicable / Not valid  -- Disagree - Clinically unable to determine / Unknown  -- Refer to Clinical Documentation Reviewer    PROVIDER RESPONSE TEXT:    This patient has anemia due to CKD. Query created by: Ivonne Freemna on 6/8/2022 9:59 AM      QUERY TEXT:    Dr Wong Ortega,    Pt admitted with uncontrolled DM2. Pt noted to have abnormal urinalyses &   prescribed IV ATB. If possible, please document in the progress notes and   discharge summary if you are evaluating and/or treating any of the following: The medical record reflects the following:  Risk Factors: ESRD, CHF, HTN, DM2  Clinical Indicators: Urinalysis: many bacteria, moderate leukocytes. Pt makes   urine and had recent UTI. Treatment: IV Cipro & Rocephin  Options provided:  -- Urinary Tract Infection (UTI)-POA  -- Bacteriuria  -- UTI ruled out  -- Other - I will add my own diagnosis  -- Disagree - Not applicable / Not valid  -- Disagree - Clinically unable to determine / Unknown  -- Refer to Clinical Documentation Reviewer    PROVIDER RESPONSE TEXT:    This patient has a UTI-POA.     Query created by: Ivonne Freeman on 6/8/2022 10:02 AM      Electronically signed by:  Zaid Vazquez MD 6/9/2022 11:14 PM

## 2022-06-10 NOTE — PROGRESS NOTES
NEPHROLOGY PROGRESS NOTE    Patient :  Peterson Marte; 57 y.o. MRN# [id number]  Location:  2106/2106-01  Attending: Dylan Ortiz MD  Admit Date:  6/6/2022   Hospital Day: 4      Reason for Consult: ESRD/hemodialysis      Chief Complaint: Uncontrolled diabetes    Subjective/interval history. Patient seen and examined, denies any new complaints, no acute events overnight  Blood pressure stable. respiratory status is stable. Patient is below her estimated dry weight. Blood glucose sub optimally controlled. Patient is suppose to take long acting insulin 45 units q 1 2hrs, but she was taking once a day at home      History of Present Illness: This is a 57 y.o. female with hypertension, type 2 diabetes mellitus, insulin-dependent diabetes mellitus, end-stage renal disease on hemodialysis Tuesday Thursday Saturday at [facility].   Patient presented to the hospital with complaints of uncontrolled high sugars her sugars were above 400, patient was not found in DKA, anion gap was 11  Patient denied any other complaint no nausea vomiting no diarrhea no chest pain or shortness of breath  Patient had dialysis on Saturday      Past Medical History:        Diagnosis Date    Backache, unspecified     CHF (congestive heart failure) ([facility] [address]     Chronic kidney disease     Coronary atherosclerosis of artery bypass graft     COVID 1/31/2022    Cramp of limb     Gallstones     Hypertension     Insomnia     Pneumonia     Type II or unspecified type diabetes mellitus with renal manifestations, not stated as uncontrolled(250.40)     Type II or unspecified type diabetes mellitus without mention of complication, not stated as uncontrolled     Unspecified vitamin D deficiency        Past Surgical History:        Procedure Laterality Date    ANKLE FRACTURE SURGERY      AV FISTULA CREATION  12/14/2021    BREAST SURGERY      CARPAL TUNNEL RELEASE      CHOLECYSTECTOMY, OPEN N/A     CORONARY ARTERY BYPASS GRAFT x3    DIALYSIS FISTULA CREATION Left 12/14/2021    LEFT AV FISTULA CREATION UPPER EXTREMITY performed [facility] Umesh M[facility] MD [facility] [address] [facility] IR TUNNELED CATHETER PLACEMENT GREATER THAN 5 YEARS  8/18/2021    IR TUNNELED CATHETER PLACEMENT GREATER THAN 5 YEARS 8/18/2021 [facility] SPECIAL PROCEDURES    KNEE ARTHROSCOPY      right    OTHER SURGICAL HISTORY  04/06/2022    cvc exchange    TONSILLECTOMY         Current Medications:    insulin glargine (LANTUS) injection vial 50 Units, BID  anticoagulant sodium citrate 4 % injection 1.6 mL, PRN  anticoagulant sodium citrate 4 % injection 1.6 mL, PRN  ciprofloxacin (CIPRO) IVPB 400 mg, Q24H  nystatin (MYCOSTATIN) ointment, BID  magnesium sulfate 2000 mg in water 50 mL IVPB, Once  sodium chloride flush 0.9 % injection 5-40 mL, 2 times per day  sodium chloride flush 0.9 % injection 5-40 mL, PRN  0.9 % sodium chloride infusion, PRN  acetaminophen (TYLENOL) tablet 650 mg, Q4H PRN  ondansetron (ZOFRAN-ODT) disintegrating tablet 4 mg, Q8H PRN   Or  ondansetron (ZOFRAN) injection 4 mg, Q6H PRN  heparin (porcine) injection 5,000 Units, 3 times per day  cefTRIAXone (ROCEPHIN) 1000 mg IVPB in NS 50ml minibag, Once  aspirin chewable tablet 81 mg, Daily  ranolazine (RANEXA) extended release tablet 1,000 mg, BID  busPIRone (BUSPAR) tablet 10 mg, TID  pantoprazole (PROTONIX) tablet 40 mg, QAM AC  atorvastatin (LIPITOR) tablet 80 mg, Daily  febuxostat (ULORIC) tablet 40 mg, Daily  docusate sodium (COLACE) capsule 100 mg, BID  tamsulosin (FLOMAX) capsule 0.4 mg, Daily  sodium bicarbonate tablet 650 mg, TID  furosemide (LASIX) tablet 80 mg, BID  traZODone (DESYREL) tablet 75 mg, Nightly  melatonin tablet 9 mg, Nightly  lidocaine-prilocaine (EMLA) cream, PRN  carvedilol (COREG) tablet 3.125 mg, BID  miconazole (MICOTIN) 2 % powder, BID  insulin lispro (HUMALOG) injection vial 0-12 Units, TID WC  insulin lispro (HUMALOG) injection vial 0-6 Units, Nightly  glucose chewable tablet 16 g, PRN  dextrose bolus 10% 125 mL, PRN   Or  dextrose bolus 10% 250 mL, PRN  glucagon (rDNA) injection 1 mg, PRN  dextrose 5 % solution, PRN        Allergies:  Adhesive tape, Ace inhibitors, Iv dye [iodides], Nsaids, and Metformin and related    Social History:   Social History     Socioeconomic History    Marital status: Single     Spouse name: Not on file    Number of children: Not on file    Years of education: Not on file    Highest education level: Not on file   Occupational History    Not on file   Tobacco Use    Smoking status: Never Smoker    Smokeless tobacco: Never Used   Vaping Use    Vaping Use: Never used   Substance and Sexual Activity    Alcohol use: No    Drug use: No    Sexual activity: Not Currently   Other Topics Concern    Not on file   Social History Narrative    Not on file     Social Determinants of Health     Financial Resource Strain: Low Risk     Difficulty of Paying Living Expenses: Not very hard   Food Insecurity: No Food Insecurity    Worried About Running Out of Food in the Last Year: Never true    Nany of Food in the Last Year: Never true   Transportation Needs: No Transportation Needs    Lack of Transportation (Medical): No    Lack of Transportation (Non-Medical): No   Physical Activity: Inactive    Days of Exercise per Week: 0 days    Minutes of Exercise per Session: 0 min   Stress: Stress Concern Present    Feeling of Stress :  To some extent   Social Connections: Socially Isolated    Frequency of Communication with Friends and Family: More than three times a week    Frequency of Social Gatherings with Friends and Family: More than three times a week    Attends Pent[employer]ism Services: Never    Active Member of Clubs or Organizations: No    Attends Club or Organization Meetings: Never    Marital Status: Never    Intimate Partner Violence:     Fear of Current or Ex-Partner: Not on file    Emotionally Abused: Not on file   [facility] Physically Abused: Not on file    Sexually Abused: Not on file   Housing Stability: Low Risk     Unable to Pay for Housing in the Last Year: No    Number of Places Lived in the Last Year: 1    Unstable Housing in the Last Year: No           Objective:  CURRENT TEMPERATURE:  Temp: 98.1 °F (36.7 °C)  MAXIMUM TEMPERATURE OVER 24HRS:  Temp (24hrs), Av.3 °F (36.8 °C), Min:98.1 °F (36.7 °C), Max:98.9 °F (37.2 °C)    CURRENT RESPIRATORY RATE:  Resp: 20  CURRENT PULSE:  Heart Rate: 72  CURRENT BLOOD PRESSURE:  BP: 137/63  24HR BLOOD PRESSURE RANGE:  Systolic (61OJR), ZLM:557 , Min:110 , KMK:841   ; Diastolic (44YLL), OZP:68, Min:32, Max:82    24HR INTAKE/OUTPUT:      Intake/Output Summary (Last 24 hours) at 6/10/2022 1718  Last data filed at 6/10/2022 1312  Gross per 24 hour   Intake 420 ml   Output --   Net 420 ml     Patient Vitals for the past 96 hrs (Last 3 readings):   Weight   22 2333 227 lb 1.2 oz (103 kg)   22 1414 223 lb 5.2 oz (101.3 kg)   22 1039 227 lb 11.8 oz (103.3 kg)       Physical Exam:  GENERAL APPEARANCE: Alert and cooperative, and appears to be in no acute distress. HEAD: normocephalic  EYES: EOMI. Not pale, anicteric   NOSE:  No nasal discharge. THROAT:  Oral cavity and pharynx normal. Moist  CARDIAC:  Rhythm is regular. LUNGS: Normal respiratory effort no accessory muscle use  NECK: Neck supple, non-tender   GI-soft nontender  MUSKULOSKELETAL: Adequately aligned spine. No joint erythema or tenderness. EXTREMITIES: No edema. Peripheral pulses intact.    NEURO: Nonfocal    Labs:   CBC:  Recent Labs     22  0558 22  0533 06/10/22  0557   WBC 5.1 3.5 3.9   RBC 2.78* 2.80* 2.84*   HGB 8.8* 8.7* 8.9*   HCT 25.9* 26.4* 26.7*   MCV 93.5 94.5 93.7   MCH 31.7 31.0 31.1   MCHC 33.9 32.8 33.2   RDW 17.8* 18.6* 18.4*   * 156 162   MPV 8.3 8.0 8.1      BMP:   Recent Labs     22  0558 22  0533 06/10/22  0557   * 133* 137   K 4.1 3.9 4.1   CL 98 98 99   CO2 28 26 29   BUN 15 21 13   CREATININE 2.04* 2.57* 1.99*   GLUCOSE 325* 258* 231*   CALCIUM 8.5* 8.7 8.8      Phosphorus:  No results for input(s): PHOS in the last 72 hours. Magnesium: No results for input(s): MG in the last 72 hours. Albumin:   Recent Labs     06/08/22  0558 06/09/22  0533 06/10/22  0557   LABALBU 3.1* 2.8* 3.1*       IRON:  No results for input(s): IRON in the last 72 hours. Iron Saturation:  Invalid input(s): PERCENTFE  TIBC:  No results for input(s): TIBC in the last 72 hours. FERRITIN:  No results for input(s): FERRITIN in the last 72 hours. SPEP: No results for input(s): SPEP in the last 72 hours. Recent Labs     06/10/22  0557   PROT 6.1*     UPEP: No results for input(s): TPU in the last 72 hours. Urine Sodium:  No results for input(s): JASON in the last 72 hours. Urine Potassium: No results for input(s): KUR in the last 72 hours. Urine Chloride:  No results for input(s): CLU in the last 72 hours. Urine Ph:  Invalid input(s): PO4U  Urine Osmolarity: No results for input(s): OSMOU in the last 72 hours. Urine Creatinine:  No results for input(s): LABCREA in the last 72 hours. Urine Eosinophils: Invalid input(s): EOSU  Urine Protein:  No results for input(s): TPU in the last 72 hours. Urinalysis:    No results for input(s): [place], PHUR, LABCAST, WBCUA, RBCUA, MUCUS, TRICHOMONAS, YEAST, BACTERIA, CLARITYU, SPECGRAV, LEUKOCYTESUR, UROBILINOGEN, BILIRUBINUR, BLOODU, GLUCOSEU, KETUA, AMORPHOUS in the last 72 hours. Radiology:  Reviewed as available. Assessment:    1.  End-stage renal disease - on hemodialysis by way of a right-sided tunneled catheter  We will maintain TTS hemodialysis schedule. Renal diet,i.e 2-gram sodium,2-gram potassium,1500 ml fluid restriction,1-gram phosphorus, 1800 KCal and 1.2 gram protein per day.     2.  Systemic hypertension - controlled    3.   Type 2 diabetes insulin-dependent diabetes mellitus uncontrolled blood sugars on insulin- diabetes management per primary team     3.  Normocytic anemia of chronic kidney disease - Hemoglobin  stable, continue with Retacrit 3003 times a week with dialysis     4. Mineral and bone disease profile -controlled     5. UTI with Klebsiella and IV Ciprofloxacin. Plan:  Continue hemodialysis Tuesday Thursday Saturday, HD tomorrow  Diabetes management per primary team, needs clarification on Lantus dose, patient suppose to take 45 units q 12 hours      Thank you for the consultation.       Electronically signed by MD Mary on 6/10/2022 at 5:18 PM

## 2022-06-10 NOTE — CARE COORDINATION
ONGOING DISCHARGE PLAN:    Patient is alert and oriented x4. Spoke with patient regarding discharge plan and patient confirms that plan is still to return to home.     Pt. Will [facility]/ [facility]. Writer notified, Yesi, from [facility] of Anticipated DC today. She will follow. Blood sugars remain elevated but are improving. Remains on IV Cipro. Nephro continues to follow. Denies needs. Anticipate DC today. Will continue to follow for additional discharge needs.     Electronically signed by Willow Mendoza RN on 6/10/2022 at 10:14 AM

## 2022-06-10 NOTE — FLOWSHEET NOTE
06/10/22 1224   Encounter Summary   Encounter Overview/Reason  Volunteer Encounter   Service Provided For: Patient   Referral/Consult From: Volunteer   Last Encounter  06/10/22   Complexity of Encounter Low   Spiritual/Emotional needs   Type Spiritual Support   Rituals, Rites and Sacraments   Type Rastafari Communion   Assessment/Intervention/Outcome   Intervention Prayer (assurance of)/[place]

## 2022-06-10 NOTE — PROGRESS NOTES
Physical Therapy  Facility/Department: [facility]  Daily Treatment Note  NAME: Terry Galeas  : 1960  MRN: [id number]    Date of Service: 6/10/2022    Discharge Recommendations:  Home with Home health PT   PT Equipment Recommendations  Equipment Needed: No    Patient Diagnosis(es): The primary encounter diagnosis was Hyperglycemia. A diagnosis of Hypokalemia was also pertinent to this visit. Assessment   Activity Tolerance: Patient limited by fatigue;Patient limited by endurance  Equipment Needed: No     Plan    Plan  Plan: 5-7 times per week  Specific Instructions for Next Treatment: progress pt distance in ambulation, progress to navigating steps, progress pt duratiion to stand with least restrictive device and no symptoms of dizziness  Current Treatment Recommendations: Strengthening;Balance training;Functional mobility training; Endurance training;Gait training;Stair training; Therapeutic activities  PT Plan of Care:  (5-7 times per week)     Restrictions  Restrictions/Precautions  Restrictions/Precautions: Fall Risk,General Precautions  Required Braces or Orthoses?: No  Position Activity Restriction  Other position/activity restrictions: up with assist, NO BP on L  UE due fistula     Subjective    Subjective  Subjective: Patient laying in bed upon arrival. Pt agreeable to PT at this time.   Pain: 8/10 bilat feet  Orientation  Overall Orientation Status: Within Normal Limits  Orientation Level: Oriented X4  Cognition  Overall Cognitive Status: WNL     Objective   Vitals  Patient Position: Sitting  SpO2: 98 % (Seated EOB)  O2 Device: None (Room air)  Bed Mobility Training  Bed Mobility Training: Yes  Balance  Sitting: Without support  Standing: With support  Transfer Training  Transfer Training: Yes  Overall Level of Assistance: Stand-by assistance  Interventions: Verbal cues  Sit to Stand: Stand-by assistance  Stand to Sit: Stand-by assistance  Gait Training  Gait Training: No (Pt deferred due to not feeling well)  Gait  Overall Level of Assistance: Stand-by assistance  Interventions: Safety awareness training  Base of Support: Widened  Speed/Annie: Slow  Gait Abnormalities: Shuffling gait; Decreased step clearance  Distance (ft): 2 Feet (to [facility])  Assistive Device: Walker, rolling     PT Exercises  Exercise Treatment: Bed Mobility x2  A/AROM Exercises: Seated LAQ, ankle pumps, and marching x5-10reps  Pressure Relief Exercises: Seated Weight shifting right & Left  Static Sitting Balance Exercises: seated EOB ~ 40 minutes SBA   Static Standing Balance Exercises: STS x3  Breathing Techniques: Deep breathing with corrected posture to improve saturation, SaO2 91% on room air, corrected posture and breathing technique improved to 97-98%. Pt able to see pulse oximeter and improvements with correct technique. Goals  Short Term Goals  Time Frame for Short term goals: 5-7 days  Short term goal 1: Pt will be able to tolerate one 30 min of therapeutic exercise to show improvent in endurance. Short term goal 2: Pt will be improve one half MMT grade in bilateral LE to show improvement in stength. Short term goal 3: Pt will be able to perform a half tandem stance and reach above head with least restrictive device with Gloria to show increased balance and ability to reach for object in the kitchen. Short term goal 4: Pt will be able to perform sit<>stand transfer Gloria with least restrictive device to show ability to safely tranfer at home. Short term goal 5: Pt will show abilty to walk up a ramp with least restricitve device and supervision to be able to enter home. Additional Goals?: Yes  Short Term Goal 6: Pt will be able to walk 150ft with least restrictive device to re-integrate the pt to commmunity ambulatory status. Short Term Goal 7: Pt will be able to demonstrate bed mobility with Gloria to show progression in [place] to dc home.   Patient Goals   Patient goals : to return home with mother    Education  Patient Education  Education Given To: Patient  Education Provided: Role of Therapy;Plan of Care;Transfer Training  Education Method: Demonstration;Verbal  Barriers to Learning: None  Education Outcome: Verbalized understanding;Continued education needed    Therapy Time   Individual Concurrent Group Co-treatment   Time In 1501         Time Out 1555         Minutes [address] [place], [place]

## 2022-06-10 NOTE — PROGRESS NOTES
FAMILY MEDICINE  - PROGRESS NOTE    Date:  6/10/2022  Abdoulaye Hirsch  [id number]      Chief Complaint   Patient presents with    Hyperglycemia         Interval History:  Improved, she states that she is starting to feel better. No significant events overnight. Specialists notes, labs & imaging reviewed. It has come to the nurse's attention that the patient has not been regularly filling her insulin at the pharmacy. Her BS's have been running high.       Subjective  Constitutional: positive for obesity  Cardiovascular: positive for lower extremity edema  Hematologic/lymphatic: positive for pallor  Behavioral/Psych: positive for anxiety  Endocrine: positive for diabetic symptoms including hyperglycemia:    Objective:    BP (!) 127/49   Pulse 66   Temp 98.1 °F (36.7 °C) (Oral)   Resp 20   Ht 5' 5\" (1.651 m)   Wt 227 lb 1.2 oz (103 kg)   SpO2 100%   BMI 37.79 kg/m²   General appearance - alert, well appearing, and in no distress and overweight  Mental status - alert, oriented to person, place, and time  Eyes - pupils equal and reactive, extraocular eye movements intact  Ears - hearing grossly normal bilaterally  Nose - normal and patent, no erythema, discharge or polyps  Mouth - mucous membranes moist, pharynx normal without lesions  Neck - supple, no significant adenopathy  Lymphatics - no palpable lymphadenopathy, no hepatosplenomegaly  Chest - clear to auscultation, no wheezes, rales or rhonchi, symmetric air entry  Heart - normal rate, regular rhythm, normal S1, S2, no murmurs, rubs, clicks or gallops  Abdomen - soft, nontender, nondistended, no masses or organomegaly  Breasts - not examined  Back exam - not examined  Neurological - alert, oriented, normal speech, no focal findings or movement disorder noted  Musculoskeletal - no joint tenderness, deformity or swelling  Extremities - pedal edema trace +  Skin - normal coloration and turgor, no rashes, no suspicious skin lesions noted    Data:   Medications:   Current Facility-Administered Medications   Medication Dose Route Frequency Provider Last Rate Last Admin    anticoagulant sodium citrate 4 % injection 1.6 mL  1.6 mL IntraCATHeter PRN Nisha Spaulding MD   1.6 mL at 06/09/22 1417    anticoagulant sodium citrate 4 % injection 1.6 mL  1.6 mL IntraCATHeter PRN Nisha Spaulding MD   1.6 mL at 06/09/22 1417    ciprofloxacin (CIPRO) IVPB 400 mg  400 mg IntraVENous Q24H Camila Mack MD   Stopped at 06/09/22 2242    insulin glargine (LANTUS) injection vial 60 Units  60 Units SubCUTAneous QAM Camila Mack MD   60 Units at 06/09/22 1021    nystatin (MYCOSTATIN) ointment   Topical BID Camila Mack MD   Given at 06/09/22 1021    magnesium sulfate 2000 mg in water 50 mL IVPB  2,000 mg IntraVENous Once Cynthia Finders, DO        sodium chloride flush 0.9 % injection 5-40 mL  5-40 mL IntraVENous 2 times per day Cynthia Finders, DO   10 mL at 06/09/22 2128    sodium chloride flush 0.9 % injection 5-40 mL  5-40 mL IntraVENous PRN Daniel Swiftdi, DO        0.9 % sodium chloride infusion   IntraVENous PRN Cynthia Finders, DO        acetaminophen (TYLENOL) tablet 650 mg  650 mg Oral Q4H PRN Cynthia Finders, DO   650 mg at 06/08/22 2207    ondansetron (ZOFRAN-ODT) disintegrating tablet 4 mg  4 mg Oral Q8H PRN Daniel Sebastianvandi, DO   4 mg at 06/08/22 0974    Or    ondansetron (ZOFRAN) injection 4 mg  4 mg IntraVENous Q6H PRN Cynthia Finders, DO        heparin (porcine) injection 5,000 Units  5,000 Units SubCUTAneous 3 times per day Cynthia Finders, DO   5,000 Units at 06/10/22 0558    cefTRIAXone (ROCEPHIN) 1000 mg IVPB in NS 50ml minibag  1,000 mg IntraVENous Once Cynthia Finders, DO        aspirin chewable tablet 81 mg  81 mg Oral Daily Camila Mack MD   81 mg at 06/09/22 1020    ranolazine (RANEXA) extended release tablet 1,000 mg  1,000 mg Oral BID Jose F Katie Whitley Arreaga MD   1,000 mg at 06/09/22 2126    busPIRone (BUSPAR) tablet 10 mg  10 mg Oral TID Danette Foss MD   10 mg at 06/09/22 2126    pantoprazole (PROTONIX) tablet 40 mg  40 mg Oral QAM AC Danette Foss MD   40 mg at 06/10/22 0559    atorvastatin (LIPITOR) tablet 80 mg  80 mg Oral Daily Danette Foss MD   80 mg at 06/09/22 1020    febuxostat (ULORIC) tablet 40 mg  40 mg Oral Daily Danette Foss MD   40 mg at 06/09/22 1024    docusate sodium (COLACE) capsule 100 mg  100 mg Oral BID Danette Foss MD   100 mg at 06/09/22 2127    tamsulosin (FLOMAX) capsule 0.4 mg  0.4 mg Oral Daily Danette Foss MD   0.4 mg at 06/09/22 1020    sodium bicarbonate tablet 650 mg  650 mg Oral TID Danette Foss MD   650 mg at 06/09/22 2127    furosemide (LASIX) tablet 80 mg  80 mg Oral BID Danette Foss MD   80 mg at 06/09/22 1711    traZODone (DESYREL) tablet 75 mg  75 mg Oral Nightly Danette Foss MD   75 mg at 06/09/22 2126    melatonin tablet 9 mg  9 mg Oral Nightly Danette Foss MD   9 mg at 06/09/22 2127    lidocaine-prilocaine (EMLA) cream   Topical PRN Danette Foss MD        carvedilol (COREG) tablet 3.125 mg  3.125 mg Oral BID Danette Foss MD   3.125 mg at 06/09/22 2131    miconazole (MICOTIN) 2 % powder   Topical BID Danette Foss MD   Given at 06/09/22 2128    insulin lispro (HUMALOG) injection vial 0-12 Units  0-12 Units SubCUTAneous TID WC Danette Foss MD   4 Units at 06/09/22 1711    insulin lispro (HUMALOG) injection vial 0-6 Units  0-6 Units SubCUTAneous Nightly Danette Foss MD   4 Units at 06/09/22 2127    glucose chewable tablet 16 g  4 tablet Oral PRN Danette Foss MD        dextrose bolus 10% 125 mL  125 mL IntraVENous PRN Danette Foss MD        Or    dextrose bolus 10% 250 mL  250 mL IntraVENous PRN Danette Foss MD        glucagon (rDNA) injection 1 mg  1 mg IntraMUSCular PRN Danette Foss MD        dextrose 5 % solution  100 mL/hr IntraVENous NADIR Stanley MD           Intake/Output Summary (Last 24 hours) at 6/10/2022 0747  Last data filed at 6/9/2022 1414  Gross per 24 hour   Intake 500 ml   Output 2700 ml   Net -2200 ml     Recent Results (from the past 24 hour(s))   POC Glucose Fingerstick    Collection Time: 06/09/22 11:13 AM   Result Value Ref Range    POC Glucose 331 (H) 65 - 105 mg/dL   POC Glucose Fingerstick    Collection Time: 06/09/22  4:14 PM   Result Value Ref Range    POC Glucose 240 (H) 65 - 105 mg/dL   POC Glucose Fingerstick    Collection Time: 06/09/22  8:48 PM   Result Value Ref Range    POC Glucose 320 (H) 65 - 105 mg/dL   CBC    Collection Time: 06/10/22  5:57 AM   Result Value Ref Range    WBC 3.9 3.5 - 11.0 k/uL    RBC 2.84 (L) 4.0 - 5.2 m/uL    Hemoglobin 8.9 (L) 12.0 - 16.0 g/dL    Hematocrit 26.7 (L) 36 - 46 %    MCV 93.7 80 - 100 fL    MCH 31.1 26 - 34 pg    MCHC 33.2 31 - 37 g/dL    RDW 18.4 (H) 11.5 - 14.9 %    Platelets 713 135 - 864 k/uL    MPV 8.1 6.0 - 12.0 fL   Comprehensive Metabolic Panel    Collection Time: 06/10/22  5:57 AM   Result Value Ref Range    Glucose 231 (H) 70 - 99 mg/dL    BUN 13 8 - 23 mg/dL    CREATININE 1.99 (H) 0.50 - 0.90 mg/dL    Calcium 8.8 8.6 - 10.4 mg/dL    Sodium 137 135 - 144 mmol/L    Potassium 4.1 3.7 - 5.3 mmol/L    Chloride 99 98 - 107 mmol/L    CO2 29 20 - 31 mmol/L    Anion Gap 9 9 - 17 mmol/L    Alkaline Phosphatase 94 35 - 104 U/L    ALT 11 5 - 33 U/L    AST 16 <32 U/L    Total Bilirubin 0.50 0.3 - 1.2 mg/dL    Total Protein 6.1 (L) 6.4 - 8.3 g/dL    Albumin 3.1 (L) 3.5 - 5.2 g/dL    GFR Non- 25 (L) >60 mL/min    GFR  31 (L) >60 mL/min    GFR Comment           -----------------------------------------------------------------  RAD:  EKG:  Micro:     Assessment & Plan:    Patient Active Problem List:     Atherosclerosis of coronary artery bypass graft of native heart without angina pectoris     [facility] Diabetic polyneuropathy associated with type 2 diabetes mellitus ([facility]     History of coronary artery bypass graft     Iron deficiency anemia     Spinal stenosis of lumbar region with neurogenic claudication     Mixed hyperlipidemia     Type 2 diabetes 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, with long-term current use of insulin ([facility])     Obesity, Class II, BMI 35-39.9     Thyroid nodule greater than or equal to 1 cm in diameter incidentally noted on imaging study     Essential hypertension     Chronic ischemic heart disease     Ischemic stroke of frontal lobe ([facility])     Morbid obesity with BMI of 40.0-44.9, adult ([facility])     Disequilibrium syndrome     Generalized weakness     Long-term memory loss     Muscle right arm weakness     Anxiety     Chronic midline low back pain with bilateral sciatica     Tremor     COVID     End-stage renal disease ([facility]     Diabetic ketoacidosis without coma associated with type 2 diabetes mellitus ([facility])     Hypokalemia     Confusion     Myoclonic jerking     Hyperglycemia           Plan:  -Hyperglycemia without DKA - BS's elevated today, increase Lantus to 50 units BID. -ESRD on dialysis - Nephrology managing, HD on T,TH,SAT. -Recurring UTI - consult Urology, continue IV Cipro. -D/C plan is home with [facility] - hopefully discharge tomorrow after dialysis. -Continue current treatments.  -Complete orders per chart.     See orders   Disposition:    Electronically signed by Vanita Jefferson MD on 6/10/2022 at 7:47 AM

## 2022-06-10 NOTE — PLAN OF CARE
Problem: Discharge Planning  Goal: Discharge to home or other facility with appropriate resources  6/10/2022 1716 by Yamel Honeycutt RN  Outcome: Progressing    Problem: Safety - Adult  Goal: Free from fall injury  6/10/2022 1716 by Yamel Honeycutt RN  Outcome: Progressing    Problem: Nutrition Deficit:  Goal: Optimize nutritional status  6/10/2022 1716 by Yamel Honeycutt RN  Outcome: Progressing    Problem: Chronic Conditions and Co-morbidities  Goal: Patient's chronic conditions and co-morbidity symptoms are monitored and maintained or improved  6/10/2022 1716 by Yamel Honeycutt RN  Outcome: Progressing    Problem: ABCDS Injury Assessment  Goal: Absence of physical injury  6/10/2022 1716 by Yamel Honeycutt RN  Outcome: Progressing    Problem: Pain  Goal: Verbalizes/displays adequate comfort level or baseline comfort level  6/10/2022 1716 by Yamel Honeycutt RN  Outcome: Progressing

## 2022-06-10 NOTE — CONSULTS
Urology Consultation    Patient:  Raine Bueno  MRN: [id number]  YOB: 1960    CHIEF COMPLAINT:  UTI    HISTORY OF PRESENT ILLNESS:   The patient is a 57 y.o. female who presents with a UTI and hyperglycemia. She reports that she had some flank pain, sky  Fever of 100 on admission. She is doing better from a pain point of view. This was her first UTI. She does have a h/o DM and a stroke. She is voiding fine. Her urine culture was positive for Klebsiella. She does have CKD as well. Patient's old records, notes and chart reviewed and summarized above.     Past Medical History:    Past Medical History:   Diagnosis Date    Backache, unspecified     CHF (congestive heart failure) (HCC)     Chronic kidney disease     Coronary atherosclerosis of artery bypass graft     COVID 1/31/2022    Cramp of limb     Gallstones     Hypertension     Insomnia     Pneumonia     Type II or unspecified type diabetes mellitus with renal manifestations, not stated as uncontrolled(250.40)     Type II or unspecified type diabetes mellitus without mention of complication, not stated as uncontrolled     Unspecified vitamin D deficiency        Past Surgical History:    Past Surgical History:   Procedure Laterality Date    ANKLE FRACTURE SURGERY      AV FISTULA CREATION  12/14/2021    BREAST SURGERY      CARPAL TUNNEL RELEASE      CHOLECYSTECTOMY, OPEN N/A     CORONARY ARTERY BYPASS GRAFT      x3    DIALYSIS FISTULA CREATION Left 12/14/2021    LEFT AV FISTULA CREATION UPPER EXTREMITY performed [facility] Tillman [facility] [address] [facility] IR TUNNELED CATHETER PLACEMENT GREATER THAN 5 YEARS  8/18/2021    IR TUNNELED CATHETER PLACEMENT GREATER THAN 5 YEARS 8/18/2021 [facility] SPECIAL PROCEDURES    KNEE ARTHROSCOPY      right    OTHER SURGICAL HISTORY  04/06/2022    cvc exchange    TONSILLECTOMY       Previous  surgery: none   Medications:    Scheduled Meds:   insulin glargine  50 Units SubCUTAneous BID    ciprofloxacin  400 mg IntraVENous Q24H    nystatin   Topical BID    magnesium sulfate  2,000 mg IntraVENous Once    sodium chloride flush  5-40 mL IntraVENous 2 times per day    heparin (porcine)  5,000 Units SubCUTAneous 3 times per day    cefTRIAXone (ROCEPHIN) IV  1,000 mg IntraVENous Once    aspirin  81 mg Oral Daily    ranolazine  1,000 mg Oral BID    busPIRone  10 mg Oral TID    pantoprazole  40 mg Oral QAM AC    atorvastatin  80 mg Oral Daily    febuxostat  40 mg Oral Daily    docusate sodium  100 mg Oral BID    tamsulosin  0.4 mg Oral Daily    sodium bicarbonate  650 mg Oral TID    furosemide  80 mg Oral BID    traZODone  75 mg Oral Nightly    melatonin  9 mg Oral Nightly    carvedilol  3.125 mg Oral BID    miconazole   Topical BID    insulin lispro  0-12 Units SubCUTAneous TID WC    insulin lispro  0-6 Units SubCUTAneous Nightly     Continuous Infusions:   sodium chloride      dextrose       PRN Meds:.anticoagulant sodium citrate, anticoagulant sodium citrate, sodium chloride flush, sodium chloride, acetaminophen, ondansetron **OR** ondansetron, lidocaine-prilocaine, glucose, dextrose bolus **OR** dextrose bolus, glucagon (rDNA), dextrose    Allergies:  Adhesive tape, Ace inhibitors, Iv dye [iodides], Nsaids, and Metformin and related    Social History:    Social History     Socioeconomic History    Marital status: Single     Spouse name: Not on file    Number of children: Not on file    Years of education: Not on file    Highest education level: Not on file   Occupational History    Not on file   Tobacco Use    Smoking status: Never Smoker    Smokeless tobacco: Never Used   Vaping Use    Vaping Use: Never used   Substance and Sexual Activity    Alcohol use: No    Drug use: No    Sexual activity: Not Currently   Other Topics Concern    Not on file   Social History Narrative    Not on file     Social Determinants of Health     Financial Resource Strain: Low Risk     Difficulty of Paying Living Expenses: Not very hard   Food Insecurity: No Food Insecurity    Worried About Running Out of Food in the Last Year: Never true    Nany of Food in the Last Year: Never true   Transportation Needs: No Transportation Needs    Lack of Transportation (Medical): No    Lack of Transportation (Non-Medical): No   Physical Activity: Inactive    Days of Exercise per Week: 0 days    Minutes of Exercise per Session: 0 min   Stress: Stress Concern Present    Feeling of Stress :  To some extent   Social Connections: Socially Isolated    Frequency of Communication with Friends and Family: More than three times a week    Frequency of Social Gatherings with Friends and Family: More than three times a week    Attends Alevism Services: Never    Active Member of Clubs or Organizations: No    Attends Club or Organization Meetings: Never    Marital Status: Never    Intimate Partner Violence:     Fear of Current or Ex-Partner: Not on file    Emotionally Abused: Not on file    Physically Abused: Not on file    Sexually Abused: Not on file   Housing Stability: [address] Unable to Pay for Housing in the Last Year: No    Number of [place] in the Last Year: 1    Unstable Housing in the Last Year: No       Family History:    Family History   Problem Relation Age of Onset    Diabetes Father     Heart Failure Father      Previous Urologic Family history: none  REVIEW OF SYSTEMS:  Constitutional: negative  Eyes: negative  Respiratory: negative  Cardiovascular: negative  Gastrointestinal: negative  Genitourinary: see HPI  Musculoskeletal: negative  Skin: negative   Neurological: negative  Hematological/Lymphatic: negative  Psychological: negative    Physical Exam:      This a 57 y.o. female   Patient Vitals for the past 24 hrs:   BP Temp Temp src Pulse Resp SpO2 Weight   06/10/22 1501 -- -- -- -- -- 98 % --   06/10/22 1230 137/63 98.1 °F (36.7 °C) Oral 72 20 97 % --   06/10/22 0655 (!) 127/49 98.1 °F (36.7 °C) Oral 66 20 100 % --   06/09/22 2333 (!) 110/32 98.4 °F (36.9 °C) Oral 66 18 99 % 227 lb 1.2 oz (103 kg)   06/09/22 2115 (!) 134/49 -- -- 68 -- -- --   06/09/22 1940 (!) 116/39 98.1 °F (36.7 °C) Oral 63 18 97 % --   06/09/22 1845 (!) 141/82 98.9 °F (37.2 °C) Oral -- -- 97 % --     Constitutional: Patient in no acute distress. Neuro: Alert and oriented to person, place and time. Psych: mood and affect normal  HEENT negative  Lungs: Respiratory effort is normal  Cardiovascular: Normal peripheral pulses  Abdomen: Soft, non-tender, non-distended with no CVA, flank pain or hepatosplenomegaly. No hernias. Kidneys normal.  Lymphatics: No palpable lymphadenopathy. Bladder non-tender and not distended. Pelvic exam: deferred  Rectal exam not indicated    LABS:   Recent Labs     06/08/22  0558 06/09/22  0533 06/10/22  0557   WBC 5.1 3.5 3.9   HGB 8.8* 8.7* 8.9*   HCT 25.9* 26.4* 26.7*   MCV 93.5 94.5 93.7   * 156 162     Recent Labs     06/08/22  0558 06/09/22  0533 06/10/22  0557   * 133* 137   K 4.1 3.9 4.1   CL 98 98 99   CO2 28 26 29   BUN 15 21 13   CREATININE 2.04* 2.57* 1.99*       Additional Lab/culture results:    Urinalysis: No results for input(s): COLORU, PHUR, LABCAST, WBCUA, RBCUA, MUCUS, TRICHOMONAS, YEAST, BACTERIA, CLARITYU, SPECGRAV, LEUKOCYTESUR, UROBILINOGEN, BILIRUBINUR, BLOODU in the last 72 hours.     Invalid input(s): NITRATE, GLUCOSEUKETONESUAMORPHOUS     -----------------------------------------------------------------  Imaging Results:      Assessment and Plan   Impression:    Patient Active Problem List   Diagnosis    Atherosclerosis of coronary artery bypass graft of native heart without angina pectoris    Chronic diastolic heart failure ([facility]    Diabetic polyneuropathy associated with type 2 diabetes mellitus (HCC)    History of coronary artery bypass graft    Iron deficiency anemia    Spinal stenosis of lumbar region with neurogenic claudication    Mixed hyperlipidemia    Type 2 diabetes 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, with long-term current use of insulin (HCC)    Obesity, Class II, BMI 35-39.9    Thyroid nodule greater than or equal to 1 cm in diameter incidentally noted on imaging study    Essential hypertension    Chronic ischemic heart disease    Ischemic stroke of frontal lobe (HCC)    Morbid obesity with BMI of 40.0-44.9, adult (HCC)    Disequilibrium syndrome    Generalized weakness    Long-term memory loss    Muscle right arm weakness    Anxiety    Chronic midline low back pain with bilateral sciatica    Tremor    COVID    End-stage renal disease ([address]    Diabetic ketoacidosis without coma associated with type 2 diabetes mellitus ([address]    Hypokalemia    Confusion    Myoclonic jerking    Hyperglycemia       Plan:     57year-old female admitted with hyperglycemia. She was found to have a Klebsiella UTI. She had a fever and flank pain on admission. Would obtain a CT stone protocol to rule out obstruction. Continue Cipro.      Adrianne Das MD  4:47 PM 6/10/2022

## 2022-06-10 NOTE — PLAN OF CARE
Problem: Safety - Adult  Goal: Free from fall injury  6/10/2022 0359 by Marquis Elizabeth RN  Outcome: Progressing     No falls noted this shift. Patient ambulates with x1 staff assistance without difficulty. Bed kept in low position. Safe environment maintained. Bedside table & call light in reach. Uses call light appropriately when needing assistance.       Problem: Nutrition Deficit:  Goal: Optimize nutritional status  6/10/2022 0359 by Marquis Elizabeth RN  Outcome: Progressing     Problem: Chronic Conditions and Co-morbidities  Goal: Patient's chronic conditions and co-morbidity symptoms are monitored and maintained or improved  Outcome: Progressing     Problem: Pain  Goal: Verbalizes/displays adequate comfort level or baseline comfort level  6/10/2022 0359 by Marquis Elizabeth RN  Outcome: Progressing     Problem: Discharge Planning  Goal: Discharge to home or other facility with appropriate resources  6/10/2022 0359 by Marquis Elizabeth RN  Outcome: Progressing

## 2022-06-11 LAB
ALBUMIN SERPL-MCNC: 2.9 G/DL (ref 3.5–5.2)
ALP BLD-CCNC: 88 U/L (ref 35–104)
ALT SERPL-CCNC: 13 U/L (ref 5–33)
ANION GAP SERPL CALCULATED.3IONS-SCNC: 10 MMOL/L (ref 9–17)
AST SERPL-CCNC: 22 U/L
BILIRUB SERPL-MCNC: 0.54 MG/DL (ref 0.3–1.2)
BUN BLDV-MCNC: 17 MG/DL (ref 8–23)
CALCIUM SERPL-MCNC: 9.3 MG/DL (ref 8.6–10.4)
CHLORIDE BLD-SCNC: 102 MMOL/L (ref 98–107)
CO2: 27 MMOL/L (ref 20–31)
CREAT SERPL-MCNC: 2.5 MG/DL (ref 0.5–0.9)
GFR AFRICAN AMERICAN: 24 ML/MIN
GFR NON-AFRICAN AMERICAN: 19 ML/MIN
GFR SERPL CREATININE-BSD FRML MDRD: ABNORMAL ML/MIN/{1.73_M2}
GLUCOSE BLD-MCNC: 143 MG/DL (ref 65–105)
GLUCOSE BLD-MCNC: 152 MG/DL (ref 65–105)
GLUCOSE BLD-MCNC: 166 MG/DL (ref 70–99)
GLUCOSE BLD-MCNC: 186 MG/DL (ref 65–105)
GLUCOSE BLD-MCNC: 203 MG/DL (ref 65–105)
HCT VFR BLD CALC: 29.1 % (ref 36–46)
HEMOGLOBIN: 9.5 G/DL (ref 12–16)
MCH RBC QN AUTO: 31 PG (ref 26–34)
MCHC RBC AUTO-ENTMCNC: 32.7 G/DL (ref 31–37)
MCV RBC AUTO: 94.7 FL (ref 80–100)
PDW BLD-RTO: 18.5 % (ref 11.5–14.9)
PLATELET # BLD: 187 K/UL (ref 150–450)
PMV BLD AUTO: 7.9 FL (ref 6–12)
POTASSIUM SERPL-SCNC: 3.6 MMOL/L (ref 3.7–5.3)
RBC # BLD: 3.07 M/UL (ref 4–5.2)
SODIUM BLD-SCNC: 139 MMOL/L (ref 135–144)
TOTAL PROTEIN: 6.2 G/DL (ref 6.4–8.3)
WBC # BLD: 3.9 K/UL (ref 3.5–11)

## 2022-06-11 PROCEDURE — 36415 COLL VENOUS BLD VENIPUNCTURE: CPT

## 2022-06-11 PROCEDURE — 90935 HEMODIALYSIS ONE EVALUATION: CPT

## 2022-06-11 PROCEDURE — 2060000000 HC ICU INTERMEDIATE R&B

## 2022-06-11 PROCEDURE — 6360000002 HC RX W HCPCS: Performed by: FAMILY MEDICINE

## 2022-06-11 PROCEDURE — 6360000002 HC RX W HCPCS: Performed by: STUDENT IN AN ORGANIZED HEALTH CARE EDUCATION/TRAINING PROGRAM

## 2022-06-11 PROCEDURE — 6370000000 HC RX 637 (ALT 250 FOR IP): Performed by: FAMILY MEDICINE

## 2022-06-11 PROCEDURE — 2580000003 HC RX 258: Performed by: STUDENT IN AN ORGANIZED HEALTH CARE EDUCATION/TRAINING PROGRAM

## 2022-06-11 PROCEDURE — 80053 COMPREHEN METABOLIC PANEL: CPT

## 2022-06-11 PROCEDURE — 85027 COMPLETE CBC AUTOMATED: CPT

## 2022-06-11 PROCEDURE — 82947 ASSAY GLUCOSE BLOOD QUANT: CPT

## 2022-06-11 RX ORDER — INSULIN GLARGINE 100 [IU]/ML
50 INJECTION, SOLUTION SUBCUTANEOUS 2 TIMES DAILY
Qty: 5 PEN | Refills: 11 | Status: SHIPPED | OUTPATIENT
Start: 2022-06-11

## 2022-06-11 RX ADMIN — NYSTATIN OINTMENT: 100000 OINTMENT TOPICAL at 21:07

## 2022-06-11 RX ADMIN — INSULIN GLARGINE 50 UNITS: 100 INJECTION, SOLUTION SUBCUTANEOUS at 17:17

## 2022-06-11 RX ADMIN — ASPIRIN 81 MG: 81 TABLET, CHEWABLE ORAL at 10:02

## 2022-06-11 RX ADMIN — TRAZODONE HYDROCHLORIDE 75 MG: 50 TABLET ORAL at 20:59

## 2022-06-11 RX ADMIN — SODIUM CHLORIDE, PRESERVATIVE FREE 10 ML: 5 INJECTION INTRAVENOUS at 20:55

## 2022-06-11 RX ADMIN — INSULIN LISPRO 2 UNITS: 100 INJECTION, SOLUTION INTRAVENOUS; SUBCUTANEOUS at 10:14

## 2022-06-11 RX ADMIN — SODIUM BICARBONATE 650 MG TABLET 650 MG: at 20:55

## 2022-06-11 RX ADMIN — CIPROFLOXACIN 400 MG: 2 INJECTION, SOLUTION INTRAVENOUS at 21:04

## 2022-06-11 RX ADMIN — NYSTATIN OINTMENT: 100000 OINTMENT TOPICAL at 10:08

## 2022-06-11 RX ADMIN — INSULIN LISPRO 2 UNITS: 100 INJECTION, SOLUTION INTRAVENOUS; SUBCUTANEOUS at 17:17

## 2022-06-11 RX ADMIN — HEPARIN SODIUM 5000 UNITS: 5000 INJECTION INTRAVENOUS; SUBCUTANEOUS at 20:58

## 2022-06-11 RX ADMIN — ONDANSETRON 4 MG: 2 INJECTION INTRAMUSCULAR; INTRAVENOUS at 14:51

## 2022-06-11 RX ADMIN — SODIUM BICARBONATE 650 MG TABLET 650 MG: at 14:51

## 2022-06-11 RX ADMIN — HEPARIN SODIUM 5000 UNITS: 5000 INJECTION INTRAVENOUS; SUBCUTANEOUS at 14:51

## 2022-06-11 RX ADMIN — RANOLAZINE 1000 MG: 500 TABLET, EXTENDED RELEASE ORAL at 20:55

## 2022-06-11 RX ADMIN — FUROSEMIDE 80 MG: 40 TABLET ORAL at 10:17

## 2022-06-11 RX ADMIN — RANOLAZINE 1000 MG: 500 TABLET, EXTENDED RELEASE ORAL at 10:03

## 2022-06-11 RX ADMIN — CARVEDILOL 3.12 MG: 3.12 TABLET, FILM COATED ORAL at 10:02

## 2022-06-11 RX ADMIN — BUSPIRONE HYDROCHLORIDE 10 MG: 10 TABLET ORAL at 10:02

## 2022-06-11 RX ADMIN — DOCUSATE SODIUM 100 MG: 100 CAPSULE, LIQUID FILLED ORAL at 10:02

## 2022-06-11 RX ADMIN — HEPARIN SODIUM 5000 UNITS: 5000 INJECTION INTRAVENOUS; SUBCUTANEOUS at 05:41

## 2022-06-11 RX ADMIN — DOCUSATE SODIUM 100 MG: 100 CAPSULE, LIQUID FILLED ORAL at 20:55

## 2022-06-11 RX ADMIN — BUSPIRONE HYDROCHLORIDE 10 MG: 10 TABLET ORAL at 20:55

## 2022-06-11 RX ADMIN — ANTI-FUNGAL POWDER MICONAZOLE NITRATE TALC FREE: 1.42 POWDER TOPICAL at 10:08

## 2022-06-11 RX ADMIN — INSULIN LISPRO 1 UNITS: 100 INJECTION, SOLUTION INTRAVENOUS; SUBCUTANEOUS at 20:53

## 2022-06-11 RX ADMIN — ANTI-FUNGAL POWDER MICONAZOLE NITRATE TALC FREE: 1.42 POWDER TOPICAL at 21:06

## 2022-06-11 RX ADMIN — SODIUM CHLORIDE, PRESERVATIVE FREE 10 ML: 5 INJECTION INTRAVENOUS at 10:19

## 2022-06-11 RX ADMIN — FEBUXOSTAT 40 MG: 40 TABLET, FILM COATED ORAL at 10:17

## 2022-06-11 RX ADMIN — FUROSEMIDE 80 MG: 40 TABLET ORAL at 17:18

## 2022-06-11 RX ADMIN — CARVEDILOL 3.12 MG: 3.12 TABLET, FILM COATED ORAL at 20:55

## 2022-06-11 RX ADMIN — ATORVASTATIN CALCIUM 80 MG: 80 TABLET, FILM COATED ORAL at 10:02

## 2022-06-11 RX ADMIN — PANTOPRAZOLE SODIUM 40 MG: 40 TABLET, DELAYED RELEASE ORAL at 05:41

## 2022-06-11 RX ADMIN — SODIUM BICARBONATE 650 MG TABLET 650 MG: at 10:01

## 2022-06-11 RX ADMIN — TAMSULOSIN HYDROCHLORIDE 0.4 MG: 0.4 CAPSULE ORAL at 10:03

## 2022-06-11 RX ADMIN — BUSPIRONE HYDROCHLORIDE 10 MG: 10 TABLET ORAL at 14:51

## 2022-06-11 RX ADMIN — INSULIN GLARGINE 50 UNITS: 100 INJECTION, SOLUTION SUBCUTANEOUS at 10:14

## 2022-06-11 RX ADMIN — Medication 9 MG: at 20:55

## 2022-06-11 ASSESSMENT — PAIN SCALES - GENERAL
PAINLEVEL_OUTOF10: 0
PAINLEVEL_OUTOF10: 0

## 2022-06-11 NOTE — PLAN OF CARE
Problem: Safety - Adult  Goal: Free from fall injury  6/11/2022 0347 by Chantel Fu RN  Outcome: Progressing     No falls noted this shift. Patient ambulates with x1 staff assistance without difficulty. Bed kept in low position. Safe environment maintained. Bedside table & call light in reach. Uses call light appropriately when needing assistance. Problem: Nutrition Deficit:  Goal: Optimize nutritional status  6/11/2022 0347 by Chantel Fu RN  Outcome: Progressing     Problem: Chronic Conditions and Co-morbidities  Goal: Patient's chronic conditions and co-morbidity symptoms are monitored and maintained or improved  6/11/2022 0347 by Chantel Fu RN  Outcome: Progressing     Problem: Pain  Goal: Verbalizes/displays adequate comfort level or baseline comfort level  6/11/2022 0347 by Chantel Fu RN  Outcome: Progressing     No pain reported this shift. Problem: Discharge Planning  Goal: Discharge to home or other facility with appropriate resources  6/11/2022 0347 by Chantel Fu RN  Outcome: Progressing     D/c plan home today after dialysis tx.

## 2022-06-11 NOTE — CARE COORDINATION
ONGOING DISCHARGE PLAN:    Patient is alert and oriented x4. Spoke with patient regarding discharge plan and patient confirms that plan is still home with [facility], referral sent. [facility] comes 5x//week to assist.     DME: walker, WC, SC, glucometer. -02 @ 3L NC 24/7 portable and concentrator. HD @ [facility], ,[place] @ [facility]    [facility] PTA    Will continue to follow for additional discharge needs.     Electronically signed by Vishal Alvarado RN on 6/11/2022 at 10:33 AM

## 2022-06-11 NOTE — PROGRESS NOTES
HEMODIALYSIS PRE-TREATMENT NOTE    Patient Identifiers prior to treatment:name, birthdate and band    Isolation Required: [place]                    Isolation Type: contact       (please document if patient is being managed as a PUI/COVID-19 patient)        Hepatitis status:                           Date Drawn                             Result  Hepatitis B Surface Antigen 03/30/2022     neg                     Hepatitis B Surface Antibody 03/30/2022 pos     60.08   Hepatitis B Core Antibody            How was Hepatitis Status verified: labs     Was a copy of the labs you documented provided to facility for the patient's chart: yes    Hemodialysis orders verified: yes    Access Within normal limits ( I.e. s/s of infection,...): yes     Pre-Assessment completed: yes    Pre-dialysis report received from: Gary Hall                      Time: 8074

## 2022-06-11 NOTE — PROGRESS NOTES
Progress Note  Date:2022       Room:[address] [place] [address]  Patient Jean Pierre Clemente     YOB: 1960     Age:62 y.o. Subjective    Subjective:  Symptoms:  Improved. (Patient is nauseated). Review of Systems  Objective         Vitals Last 24 Hours:  TEMPERATURE:  Temp  Av °F (36.7 °C)  Min: 97.8 °F (36.6 °C)  Max: 98.1 °F (36.7 °C)  RESPIRATIONS RANGE: Resp  Av  Min: 20  Max: 20  PULSE OXIMETRY RANGE: SpO2  Av.8 %  Min: 93 %  Max: 98 %  PULSE RANGE: Pulse  Av.6  Min: 66  Max: 82  BLOOD PRESSURE RANGE: Systolic (30NSS), NEEL:675 , Min:121 , OGJ:876   ; Diastolic (99ACO), QNL:76, Min:40, Max:63    I/O (24Hr): Intake/Output Summary (Last 24 hours) at 2022 1032  Last data filed at 2022 0545  Gross per 24 hour   Intake 420 ml   Output 700 ml   Net -280 ml     Objective:  General Appearance:  Comfortable. Vital signs: (most recent): Blood pressure (!) 147/53, pulse 82, temperature 98 °F (36.7 °C), temperature source Oral, resp. rate 20, height 5' 5\" (1.651 m), weight 227 lb 4.7 oz (103.1 kg), SpO2 93 %. Vital signs are normal.      Labs/Imaging/Diagnostics    Labs:  CBC:  Recent Labs     22  0533 06/10/22  0557 22  0544   WBC 3.5 3.9 3.9   RBC 2.80* 2.84* 3.07*   HGB 8.7* 8.9* 9.5*   HCT 26.4* 26.7* 29.1*   MCV 94.5 93.7 94.7   RDW 18.6* 18.4* 18.5*    162 187     CHEMISTRIES:  Recent Labs     22  0533 06/10/22  0557 22  0544   * 137 139   K 3.9 4.1 3.6*   CL 98 99 102   CO2 26 29 27   BUN 21 13 17   CREATININE 2.57* 1.99* 2.50*   GLUCOSE 258* 231* 166*     PT/INR:No results for input(s): PROTIME, INR in the last 72 hours. APTT:No results for input(s): APTT in the last 72 hours.   LIVER PROFILE:  Recent Labs     22  0533 06/10/22  0557 22  0544   AST 14 16 22   ALT 11 11 13   BILITOT 0.61 0.50 0.54   ALKPHOS 90 94 88       Imaging Last 24 Hours:  CT ABDOMEN PELVIS WO CONTRAST Additional Contrast? None    Result Date: 6/10/2022  EXAMINATION: CT OF THE ABDOMEN AND PELVIS WITHOUT CONTRAST 6/10/2022 4:23 pm TECHNIQUE: CT of the abdomen and pelvis was performed without the administration of intravenous contrast. Multiplanar reformatted images are provided for review. Automated exposure control, iterative reconstruction, and/or weight based adjustment of the mA/kV was utilized to reduce the radiation dose to as low as reasonably achievable. COMPARISON: None. HISTORY: ORDERING SYSTEM PROVIDED HISTORY: flank pain, UTI TECHNOLOGIST PROVIDED HISTORY: flank pain, UTI Reason for Exam: flank pain, UTI, nausea Relevant Medical/Surgical History: dialysis, cholecystectomy FINDINGS: Lower Chest: The heart is enlarged. The visualized lung bases are grossly clear. A hemodialysis catheter ends in the upper right atrium. Trace left pleural effusion. Organs: The spleen is mildly enlarged and measures 14.8 cm in length, although is homogeneous in appearance. The liver has a slightly nodular surface contour which suggests cirrhosis. The gallbladder is surgically absent. The pancreas, kidneys, and adrenal glands have an unremarkable unenhanced CT appearance. Extensive renal arterial vascular calcifications. No renal cortical edema or perinephric inflammatory fat stranding. No renal, ureteral, or bladder calculi. No hydronephrosis or hydroureter. GI/Bowel: The stomach and the small and large bowel loops are normal in caliber, contour, and morphology, without acute or significant abnormality. No dilated loops or areas of bowel wall thickening. The appendix is normal. Peritoneum/Retroperitoneum: There is no free fluid or extraluminal gas. No enlarged or suspicious mesenteric or retroperitoneal lymphadenopathy. The abdominal aorta and iliac arteries are normal in caliber. Pelvis: No pelvic free fluid or enlarged or suspicious pelvic or inguinal lymphadenopathy. No appreciable uterine or adnexal abnormality.  Mild diffuse urinary bladder wall

## 2022-06-11 NOTE — PROGRESS NOTES
NEPHROLOGY PROGRESS NOTE    Patient :  Terrence Cavanaugh; 57 y.o. MRN# [id number]  Location:  2106/2106-01  Attending: Anju Burns MD  Admit Date:  6/6/2022   Hospital Day: 5      Reason for Consult: ESRD/hemodialysis      Chief Complaint: Uncontrolled diabetes    Subjective/interval history. Patient seen and examined, denies any new complaints, no acute events overnight  Blood pressure stable. respiratory status is stable. Patient is below her estimated dry weight. Blood glucose controlled. History of Present Illness: This is a 57 y.o. female with hypertension, type 2 diabetes mellitus, insulin-dependent diabetes mellitus, end-stage renal disease [facility] Tuesday [facility] [address]  [facility]   Patient presented to the hospital with complaints of uncontrolled high sugars her sugars were above 400, patient was not found in DKA, anion gap was 11  Patient denied any other complaint no nausea vomiting no diarrhea no chest pain or shortness of breath  Patient had dialysis on Saturday      Past Medical History:        Diagnosis Date    Backache, unspecified     [facility]congestive [facility] failure) [facility] [address]     Chronic kidney disease     Coronary atherosclerosis of artery bypass graft     COVID 1/31/2022    Cramp of limb     Gallstones     Hypertension     Insomnia     Pneumonia     Type II or unspecified type diabetes mellitus with renal manifestations, not stated as uncontrolled(250.40)     Type II or unspecified type diabetes mellitus without mention of complication, not stated as uncontrolled     Unspecified vitamin D deficiency        Past Surgical History:        Procedure Laterality Date    ANKLE FRACTURE SURGERY      AV FISTULA CREATION  12/14/2021    BREAST SURGERY      CARPAL TUNNEL RELEASE      CHOLECYSTECTOMY, OPEN N/A     CORONARY ARTERY BYPASS GRAFT      x3    DIALYSIS FISTULA CREATION Left 12/14/2021    LEFT AV FISTULA CREATION UPPER EXTREMITY performed by Mine Macario Costa Gaytan MD at [address] [facility] IR TUNNELED CATHETER PLACEMENT GREATER THAN 5 YEARS  8/18/2021    IR TUNNELED CATHETER PLACEMENT GREATER THAN 5 YEARS 8/18/2021 [facility] SPECIAL PROCEDURES    KNEE ARTHROSCOPY      right    OTHER SURGICAL HISTORY  04/06/2022    cvc exchange    TONSILLECTOMY         Current Medications:    insulin glargine (LANTUS) injection vial 50 Units, BID WC  anticoagulant sodium citrate 4 % injection 1.6 mL, PRN  anticoagulant sodium citrate 4 % injection 1.6 mL, PRN  ciprofloxacin (CIPRO) IVPB 400 mg, Q24H  nystatin (MYCOSTATIN) ointment, BID  magnesium sulfate 2000 mg in water 50 mL IVPB, Once  sodium chloride flush 0.9 % injection 5-40 mL, 2 times per day  sodium chloride flush 0.9 % injection 5-40 mL, PRN  0.9 % sodium chloride infusion, PRN  acetaminophen (TYLENOL) tablet 650 mg, Q4H PRN  ondansetron (ZOFRAN-ODT) disintegrating tablet 4 mg, Q8H PRN   Or  ondansetron (ZOFRAN) injection 4 mg, Q6H PRN  heparin (porcine) injection 5,000 Units, 3 times per day  cefTRIAXone (ROCEPHIN) 1000 mg IVPB in NS 50ml minibag, Once  aspirin chewable tablet 81 mg, Daily  ranolazine (RANEXA) extended release tablet 1,000 mg, BID  busPIRone (BUSPAR) tablet 10 mg, TID  pantoprazole (PROTONIX) tablet 40 mg, QAM AC  atorvastatin (LIPITOR) tablet 80 mg, Daily  febuxostat (ULORIC) tablet 40 mg, Daily  docusate sodium (COLACE) capsule 100 mg, BID  tamsulosin (FLOMAX) capsule 0.4 mg, Daily  sodium bicarbonate tablet 650 mg, TID  furosemide (LASIX) tablet 80 mg, BID  traZODone (DESYREL) tablet 75 mg, Nightly  melatonin tablet 9 mg, Nightly  lidocaine-prilocaine (EMLA) cream, PRN  carvedilol (COREG) tablet 3.125 mg, BID  miconazole (MICOTIN) 2 % powder, BID  insulin lispro (HUMALOG) injection vial 0-12 Units, TID WC  insulin lispro (HUMALOG) injection vial 0-6 Units, Nightly  glucose chewable tablet 16 g, PRN  dextrose bolus 10% 125 mL, PRN   Or  dextrose bolus 10% 250 mL, PRN  glucagon (rDNA) injection 1 mg, PRN  dextrose 5 % solution, PRN        Allergies:  Adhesive tape, Ace inhibitors, Iv dye [iodides], Nsaids, and Metformin and related    Social History:   Social History     Socioeconomic History    Marital status: Single     Spouse name: Not on file    Number of children: Not on file    Years of education: Not on file    Highest education level: Not on file   Occupational History    Not on file   Tobacco Use    Smoking status: Never Smoker    Smokeless tobacco: Never Used   Vaping Use    Vaping Use: Never used   Substance and Sexual Activity    Alcohol use: No    Drug use: No    Sexual activity: Not Currently   Other Topics Concern    Not on file   Social History Narrative    Not on file     Social Determinants of Health     Financial Resource Strain: Low Risk     Difficulty of Paying Living Expenses: Not very hard   Food Insecurity: No Food Insecurity    Worried About Running Out of Food in the Last Year: Never true    Nany of Food in the Last Year: Never true   Transportation Needs: No Transportation Needs    Lack of Transportation (Medical): No    Lack of Transportation (Non-Medical): No   Physical Activity: Inactive    Days of Exercise per Week: 0 days    Minutes of Exercise per Session: 0 min   Stress: Stress Concern Present    Feeling of Stress :  To some extent   Social Connections: Socially Isolated    Frequency of Communication with Friends and Family: More than three times a week    Frequency of Social Gatherings with Friends and Family: More than three times a week    Attends [employer] Services: Never    Active Member of Clubs or Organizations: No    Attends Club or Organization Meetings: Never    Marital Status: Never    Intimate Partner Violence:     Fear of Current or Ex-Partner: Not on file    Emotionally Abused: Not on file    Physically Abused: Not on file    Sexually Abused: Not on file   Housing Stability: [address] Unable to Pay for Housing in the Last Year: No    Number of Places Lived in the Last Year: 1    Unstable Housing in the Last Year: No           Objective:  CURRENT TEMPERATURE:  Temp: 98.2 °F (36.8 °C)  MAXIMUM TEMPERATURE OVER 24HRS:  Temp (24hrs), Av °F (36.7 °C), Min:97.8 °F (36.6 °C), Max:98.2 °F (36.8 °C)    CURRENT RESPIRATORY RATE:  Resp: 18  CURRENT PULSE:  Heart Rate: 71  CURRENT BLOOD PRESSURE:  BP: (!) 104/47  24HR BLOOD PRESSURE RANGE:  Systolic (62FBM), BSZ:286 , Min:91 , BDN:211   ; Diastolic (56CSN), VBN:48, Min:35, Max:62    24HR INTAKE/OUTPUT:      Intake/Output Summary (Last 24 hours) at 2022 1524  Last data filed at 2022 1421  Gross per 24 hour   Intake 740 ml   Output 3100 ml   Net -2360 ml     Patient Vitals for the past 96 hrs (Last 3 readings):   Weight   22 1421 223 lb 15.8 oz (101.6 kg)   22 1027 227 lb 4.7 oz (103.1 kg)   22 0005 227 lb 4.7 oz (103.1 kg)       Physical Exam:  GENERAL APPEARANCE: Alert and cooperative, and appears to be in no acute distress. HEAD: normocephalic  EYES: EOMI. Not pale, anicteric   NOSE:  No nasal discharge. THROAT:  Oral cavity and pharynx normal. Moist  CARDIAC:  Rhythm is regular. LUNGS: Normal respiratory effort no accessory muscle use  NECK: Neck supple, non-tender   GI-soft nontender  MUSKULOSKELETAL: Adequately aligned spine. No joint erythema or tenderness. EXTREMITIES: No edema. Peripheral pulses intact.    NEURO: Nonfocal    Labs:   CBC:  Recent Labs     22  0533 06/10/22  0557 22  0544   WBC 3.5 3.9 3.9   RBC 2.80* 2.84* 3.07*   HGB 8.7* 8.9* 9.5*   HCT 26.4* 26.7* 29.1*   MCV 94.5 93.7 94.7   MCH 31.0 31.1 31.0   MCHC 32.8 33.2 32.7   RDW 18.6* 18.4* 18.5*    162 187   MPV 8.0 8.1 7.9      BMP:   Recent Labs     22  0533 06/10/22  0557 22  0544   * 137 139   K 3.9 4.1 3.6*   CL 98 99 102   CO2 26 29 27   BUN 21 13 17   CREATININE 2.57* 1.99* 2.50*   GLUCOSE 258* 231* 166*   CALCIUM 8.7 8.8 9.3      Phosphorus:  No results for input(s): PHOS in the last 72 hours. Magnesium: No results for input(s): MG in the last 72 hours. Albumin:   Recent Labs     06/09/22  0533 06/10/22  0557 06/11/22  0544   LABALBU 2.8* 3.1* 2.9*       IRON:  No results for input(s): IRON in the last 72 hours. Iron Saturation:  Invalid input(s): PERCENTFE  TIBC:  No results for input(s): TIBC in the last 72 hours. FERRITIN:  No results for input(s): FERRITIN in the last 72 hours. SPEP: No results for input(s): SPEP in the last 72 hours. Recent Labs     06/11/22  0544   PROT 6.2*     UPEP: No results for input(s): TPU in the last 72 hours. Urine Sodium:  No results for input(s): JASON in the last 72 hours. Urine Potassium: No results for input(s): KUR in the last 72 hours. Urine Chloride:  No results for input(s): CLU in the last 72 hours. Urine Ph:  Invalid input(s): PO4U  Urine Osmolarity: No results for input(s): OSMOU in the last 72 hours. Urine Creatinine:  No results for input(s): LABCREA in the last 72 hours. Urine Eosinophils: Invalid input(s): EOSU  Urine Protein:  No results for input(s): TPU in the last 72 hours. Urinalysis:    No results for input(s): Shelley Trimble, PHUR, LABCAST, WBCUA, RBCUA, MUCUS, TRICHOMONAS, YEAST, BACTERIA, CLARITYU, SPECGRAV, LEUKOCYTESUR, UROBILINOGEN, BILIRUBINUR, BLOODU, GLUCOSEU, KETUA, AMORPHOUS in the last 72 hours. Radiology:  Reviewed as available. Assessment:    1.  End-stage renal disease - on hemodialysis by way of a right-sided tunneled catheter  We will maintain TTS hemodialysis schedule. Renal diet,i.e 2-gram sodium,2-gram potassium,1500 ml fluid restriction,1-gram phosphorus, 1800 KCal and 1.2 gram protein per day.     2.  Systemic hypertension - controlled    3.   Type 2 diabetes insulin-dependent diabetes mellitus uncontrolled blood sugars on insulin- diabetes management per primary team     3.  Normocytic anemia of chronic kidney disease - Hemoglobin stable, continue with Retacrit 3003 times a week with dialysis     4. Mineral and bone disease profile -controlled     5. UTI with Klebsiella and IV Ciprofloxacin. Plan:  Continue hemodialysis Tuesday Thursday Saturday, HD today as per orders  Continue insulin Lantus 50 units q 12 hours      Thank you for the consultation.       Electronically signed by Raine Mukherjee MD on 6/11/2022 at 3:24 PM

## 2022-06-12 VITALS
HEIGHT: 65 IN | OXYGEN SATURATION: 93 % | RESPIRATION RATE: 18 BRPM | BODY MASS INDEX: 36.51 KG/M2 | DIASTOLIC BLOOD PRESSURE: 50 MMHG | SYSTOLIC BLOOD PRESSURE: 133 MMHG | HEART RATE: 75 BPM | TEMPERATURE: 97.8 F | WEIGHT: 219.14 LBS

## 2022-06-12 LAB
ALBUMIN SERPL-MCNC: 3 G/DL (ref 3.5–5.2)
ALP BLD-CCNC: 87 U/L (ref 35–104)
ALT SERPL-CCNC: 14 U/L (ref 5–33)
ANION GAP SERPL CALCULATED.3IONS-SCNC: 8 MMOL/L (ref 9–17)
AST SERPL-CCNC: 26 U/L
BILIRUB SERPL-MCNC: 0.58 MG/DL (ref 0.3–1.2)
BUN BLDV-MCNC: 10 MG/DL (ref 8–23)
CALCIUM SERPL-MCNC: 8.9 MG/DL (ref 8.6–10.4)
CHLORIDE BLD-SCNC: 101 MMOL/L (ref 98–107)
CO2: 28 MMOL/L (ref 20–31)
CREAT SERPL-MCNC: 1.84 MG/DL (ref 0.5–0.9)
GFR AFRICAN AMERICAN: 33 ML/MIN
GFR NON-AFRICAN AMERICAN: 28 ML/MIN
GFR SERPL CREATININE-BSD FRML MDRD: ABNORMAL ML/MIN/{1.73_M2}
GLUCOSE BLD-MCNC: 128 MG/DL (ref 65–105)
GLUCOSE BLD-MCNC: 133 MG/DL (ref 70–99)
GLUCOSE BLD-MCNC: 213 MG/DL (ref 65–105)
HCT VFR BLD CALC: 28.6 % (ref 36–46)
HEMOGLOBIN: 9.6 G/DL (ref 12–16)
MCH RBC QN AUTO: 31.8 PG (ref 26–34)
MCHC RBC AUTO-ENTMCNC: 33.7 G/DL (ref 31–37)
MCV RBC AUTO: 94.4 FL (ref 80–100)
PDW BLD-RTO: 19.1 % (ref 11.5–14.9)
PLATELET # BLD: 175 K/UL (ref 150–450)
PMV BLD AUTO: 7.9 FL (ref 6–12)
POTASSIUM SERPL-SCNC: 4.1 MMOL/L (ref 3.7–5.3)
RBC # BLD: 3.02 M/UL (ref 4–5.2)
SODIUM BLD-SCNC: 137 MMOL/L (ref 135–144)
TOTAL PROTEIN: 6.4 G/DL (ref 6.4–8.3)
WBC # BLD: 3.9 K/UL (ref 3.5–11)

## 2022-06-12 PROCEDURE — 99239 HOSP IP/OBS DSCHRG MGMT >30: CPT | Performed by: FAMILY MEDICINE

## 2022-06-12 PROCEDURE — 6360000002 HC RX W HCPCS: Performed by: STUDENT IN AN ORGANIZED HEALTH CARE EDUCATION/TRAINING PROGRAM

## 2022-06-12 PROCEDURE — 85027 COMPLETE CBC AUTOMATED: CPT

## 2022-06-12 PROCEDURE — 36415 COLL VENOUS BLD VENIPUNCTURE: CPT

## 2022-06-12 PROCEDURE — 2580000003 HC RX 258: Performed by: STUDENT IN AN ORGANIZED HEALTH CARE EDUCATION/TRAINING PROGRAM

## 2022-06-12 PROCEDURE — 80053 COMPREHEN METABOLIC PANEL: CPT

## 2022-06-12 PROCEDURE — 6370000000 HC RX 637 (ALT 250 FOR IP): Performed by: FAMILY MEDICINE

## 2022-06-12 PROCEDURE — 82947 ASSAY GLUCOSE BLOOD QUANT: CPT

## 2022-06-12 RX ADMIN — HEPARIN SODIUM 5000 UNITS: 5000 INJECTION INTRAVENOUS; SUBCUTANEOUS at 06:20

## 2022-06-12 RX ADMIN — ATORVASTATIN CALCIUM 80 MG: 80 TABLET, FILM COATED ORAL at 08:47

## 2022-06-12 RX ADMIN — FEBUXOSTAT 40 MG: 40 TABLET, FILM COATED ORAL at 08:48

## 2022-06-12 RX ADMIN — INSULIN GLARGINE 50 UNITS: 100 INJECTION, SOLUTION SUBCUTANEOUS at 08:54

## 2022-06-12 RX ADMIN — INSULIN LISPRO 4 UNITS: 100 INJECTION, SOLUTION INTRAVENOUS; SUBCUTANEOUS at 11:37

## 2022-06-12 RX ADMIN — SODIUM BICARBONATE 650 MG TABLET 650 MG: at 08:47

## 2022-06-12 RX ADMIN — DOCUSATE SODIUM 100 MG: 100 CAPSULE, LIQUID FILLED ORAL at 08:47

## 2022-06-12 RX ADMIN — SODIUM CHLORIDE, PRESERVATIVE FREE 10 ML: 5 INJECTION INTRAVENOUS at 07:40

## 2022-06-12 RX ADMIN — CARVEDILOL 3.12 MG: 3.12 TABLET, FILM COATED ORAL at 08:47

## 2022-06-12 RX ADMIN — FUROSEMIDE 80 MG: 40 TABLET ORAL at 08:46

## 2022-06-12 RX ADMIN — BUSPIRONE HYDROCHLORIDE 10 MG: 10 TABLET ORAL at 08:48

## 2022-06-12 RX ADMIN — TAMSULOSIN HYDROCHLORIDE 0.4 MG: 0.4 CAPSULE ORAL at 08:47

## 2022-06-12 RX ADMIN — NYSTATIN OINTMENT: 100000 OINTMENT TOPICAL at 09:03

## 2022-06-12 RX ADMIN — PANTOPRAZOLE SODIUM 40 MG: 40 TABLET, DELAYED RELEASE ORAL at 06:20

## 2022-06-12 RX ADMIN — ASPIRIN 81 MG: 81 TABLET, CHEWABLE ORAL at 08:47

## 2022-06-12 RX ADMIN — RANOLAZINE 1000 MG: 500 TABLET, EXTENDED RELEASE ORAL at 08:46

## 2022-06-12 RX ADMIN — ANTI-FUNGAL POWDER MICONAZOLE NITRATE TALC FREE: 1.42 POWDER TOPICAL at 09:02

## 2022-06-12 RX ADMIN — ONDANSETRON 4 MG: 2 INJECTION INTRAMUSCULAR; INTRAVENOUS at 07:39

## 2022-06-12 ASSESSMENT — ENCOUNTER SYMPTOMS: NAUSEA: 1

## 2022-06-12 NOTE — PROGRESS NOTES
Pt has some abrasion to R buttocks, nothing open and no drainage. Barrier cream applied. Offloading discussed with pt and repositioning. She v/u.

## 2022-06-12 NOTE — CARE COORDINATION
ONGOING DISCHARGE PLAN:    Patient is alert and oriented x4. Spoke with patient regarding discharge plan and patient confirms that plan is still home with [facility]. Senior living comes to home 5x/week    DME; walker, WC, SC, glucometer, wears 02 @ 3L NC 24/7 portable and concentrator. HD @ [facility].SA @ 12    Eliquis PTA    Anticipate DC today. ANTHONY COMPLETE    Will continue to follow for additional discharge needs.     Electronically signed by Юлия Garcia RN on 6/12/2022 at 10:27 AM

## 2022-06-12 NOTE — PROGRESS NOTES
Progress Note  Date:2022       Room:[address] [place] [address]  Patient Genesis Araujo     YOB: 1960     Age:62 y.o. Subjective    Subjective:  Symptoms:  Resolved. Diet:  Adequate intake. She reports  nausea. Activity level: Returning to normal.    Pain:  She reports no pain. Review of Systems   Gastrointestinal: Positive for nausea. Objective         Vitals Last 24 Hours:  TEMPERATURE:  Temp  Av.2 °F (36.8 °C)  Min: 97.8 °F (36.6 °C)  Max: 98.7 °F (37.1 °C)  RESPIRATIONS RANGE: Resp  Av  Min: 18  Max: 18  PULSE OXIMETRY RANGE: SpO2  Av.5 %  Min: 87 %  Max: 93 %  PULSE RANGE: Pulse  Av  Min: 69  Max: 78  BLOOD PRESSURE RANGE: Systolic (28NCF), CYB:498 , Min:91 , XJC:655   ; Diastolic (97QDP), OJI:04, Min:35, Max:62    I/O (24Hr): Intake/Output Summary (Last 24 hours) at 2022 0940  Last data filed at 2022 0830  Gross per 24 hour   Intake 1425 ml   Output 2450 ml   Net -1025 ml     Objective:  General Appearance:  Comfortable. Vital signs: (most recent): Blood pressure (!) 133/50, pulse 75, temperature 97.8 °F (36.6 °C), temperature source Oral, resp. rate 18, height 5' 5\" (1.651 m), weight 219 lb 2.2 oz (99.4 kg), SpO2 93 %. Vital signs are normal.      Labs/Imaging/Diagnostics    Labs:  CBC:  Recent Labs     06/10/22  0557 06/11/22  0544 22  0720   WBC 3.9 3.9 3.9   RBC 2.84* 3.07* 3.02*   HGB 8.9* 9.5* 9.6*   HCT 26.7* 29.1* 28.6*   MCV 93.7 94.7 94.4   RDW 18.4* 18.5* 19.1*    187 175     CHEMISTRIES:  Recent Labs     06/10/22  0557 22  0544 22  0720    139 137   K 4.1 3.6* 4.1   CL 99 102 101   CO2 29 27 28   BUN 13 17 10   CREATININE 1.99* 2.50* 1.84*   GLUCOSE 231* 166* 133*     PT/INR:No results for input(s): PROTIME, INR in the last 72 hours. APTT:No results for input(s): APTT in the last 72 hours.   LIVER PROFILE:  Recent Labs     06/10/22  0557 22  0544 22  0720   AST 16 22 26   ALT 11 13 14 BILITOT 0.50 0.54 0.58   ALKPHOS 94 88 87       Imaging Last 24 Hours:  CT ABDOMEN PELVIS WO CONTRAST Additional Contrast? None    Result Date: 6/10/2022  EXAMINATION: CT OF THE ABDOMEN AND PELVIS WITHOUT CONTRAST 6/10/2022 4:23 pm TECHNIQUE: CT of the abdomen and pelvis was performed without the administration of intravenous contrast. Multiplanar reformatted images are provided for review. Automated exposure control, iterative reconstruction, and/or weight based adjustment of the mA/kV was utilized to reduce the radiation dose to as low as reasonably achievable. COMPARISON: None. HISTORY: ORDERING SYSTEM PROVIDED HISTORY: flank pain, UTI TECHNOLOGIST PROVIDED HISTORY: flank pain, UTI Reason for Exam: flank pain, UTI, nausea Relevant Medical/Surgical History: dialysis, cholecystectomy FINDINGS: Lower Chest: The heart is enlarged. The visualized lung bases are grossly clear. A hemodialysis catheter ends in the upper right atrium. Trace left pleural effusion. Organs: The spleen is mildly enlarged and measures 14.8 cm in length, although is homogeneous in appearance. The liver has a slightly nodular surface contour which suggests cirrhosis. The gallbladder is surgically absent. The pancreas, kidneys, and adrenal glands have an unremarkable unenhanced CT appearance. Extensive renal arterial vascular calcifications. No renal cortical edema or perinephric inflammatory fat stranding. No renal, ureteral, or bladder calculi. No hydronephrosis or hydroureter. GI/Bowel: The stomach and the small and large bowel loops are normal in caliber, contour, and morphology, without acute or significant abnormality. No dilated loops or areas of bowel wall thickening. The appendix is normal. Peritoneum/Retroperitoneum: There is no free fluid or extraluminal gas. No enlarged or suspicious mesenteric or retroperitoneal lymphadenopathy. The abdominal aorta and iliac arteries are normal in caliber.  Pelvis: No pelvic free fluid or enlarged or suspicious pelvic or inguinal lymphadenopathy. No appreciable uterine or adnexal abnormality. Mild diffuse urinary bladder wall thickening as well as a mild degree of surrounding inflammatory fat stranding, findings suggestive of acute cystitis. The pelvic bowel loops are unremarkable. Bones/Soft Tissues: Degenerative and post-surgical changes are present within the lumbar spine. No acute fracture. Mild diffuse urinary bladder wall thickening with surrounding inflammation, findings most likely representing acute cystitis. No findings of acute pyelonephritis. Splenomegaly. Suspected cirrhotic liver morphology. No ascites fluid. Trace left pleural effusion.      Assessment//Plan           Hospital Problems           Last Modified POA    * (Principal) Hyperglycemia 6/6/2022 Yes    Chronic diastolic heart failure ([address] 6/7/2022 Yes    Diabetic polyneuropathy associated with type 2 diabetes mellitus ([address] 6/7/2022 Yes    Iron deficiency anemia 6/7/2022 Yes    Spinal stenosis of lumbar region with neurogenic claudication 6/7/2022 Yes    Mixed hyperlipidemia 6/7/2022 Yes    Type 2 diabetes 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, with long-term current use of insulin ([address] 6/7/2022 Yes    Obesity, Class II, BMI 35-39.9 6/7/2022 Yes    Essential hypertension 6/7/2022 Yes    Ischemic stroke of frontal lobe ([address] 6/7/2022 Yes    Disequilibrium syndrome 6/7/2022 Yes    Anxiety 6/7/2022 Yes    End-stage renal disease ([address] 6/7/2022 Yes        Assessment & Plan  D/c home    Electronically signed by Tiff Weiss MD on 6/12/22 at 9:40 AM EDT

## 2022-06-12 NOTE — PROGRESS NOTES
RN agrees with charting done by [facility] RN. Pt discharged. Pts sister took pt home. Discharge instructions reviewed, all questions answered.

## 2022-06-12 NOTE — PLAN OF CARE
Problem: Safety - Adult  Goal: Free from fall injury  Outcome: Progressing  Note: No falls noted this shift. Patient ambulates with x1 staff assistance without difficulty. Bed kept in low position. Safe environment maintained. Bedside table & call light in reach. Uses call light appropriately when needing assistance. Problem: Chronic Conditions and Co-morbidities  Goal: Patient's chronic conditions and co-morbidity symptoms are monitored and maintained or improved  Outcome: Progressing  Note: Pt blood glucose 152 and insulin given per [facility]; will continue to monitor blood glucose levels. Problem: Pain  Goal: Verbalizes/displays adequate comfort level or baseline comfort level  Outcome: Adequate for Discharge  Note: No pain at this time. 0/10 pain scale. Problem: Skin/Tissue Integrity  Goal: Absence of new skin breakdown  Description: 1. Monitor for areas of redness and/or skin breakdown  2. Assess vascular access sites hourly  3. Every 4-6 hours minimum:  Change oxygen saturation probe site  4. Every 4-6 hours:  If on nasal continuous positive airway pressure, respiratory therapy assess nares and determine need for appliance change or resting period. Outcome: Adequate for Discharge  Note: Skin assessment complete. Turned and repositioned every two hours per self. Area kept free from moisture. Proper nourishment and fluids encouraged, as appropriate. Will continue to monitor for additional needs and changes in skin breakdown.

## 2022-06-13 ENCOUNTER — CLINICAL DOCUMENTATION (OUTPATIENT)
Dept: PHYSICAL THERAPY | Age: 62
End: 2022-06-13

## 2022-06-13 ENCOUNTER — CARE COORDINATION (OUTPATIENT)
Dept: OTHER | Facility: CLINIC | Age: 62
End: 2022-06-13

## 2022-06-13 NOTE — CARE COORDINATION
Care Transitions Outreach Attempt #1    Call within 2 business days of discharge: Yes     Attempt #1 to reach patient for transitions of care follow up. Unable to reach patient. Voicemail left requesting call back. Patient: Jacinta Ray Patient : 1960 MRN: [id number]    Last Discharge [facility]       Complaint Diagnosis Description Type Department Provider    22 Hyperglycemia Hyperglycemia . .. ED to Hosp-Admission (Discharged) (ADMITTED) Norman Wood MD; [facility]. .. Was this an external facility discharge?  No Discharge Facility: [facility]    Noted following upcoming appointments from discharge chart review:   [facility] follow up appointment(s):   Future Appointments   Date Time Provider Balbir Palacios   6/15/2022  1:15 PM AFSANEH Park - CNP [place]  [facility]   2022 11:00 AM Jordan Hernadez MD Resp Spec Genette [place]re   2022  2:20 PM Allen Schwab MD Neuro Spec Leanne Randall RN BSN   Care Transitions Nurse  [phone]

## 2022-06-13 NOTE — CARE COORDINATION
Dry Needling, 1 or 2 muscles  [id number]   [] Biofeedback, first 15 minutes   [id number]  [] Biofeedback, additional 15 minutes   [id number] [] Dry Needling, 3 or more muscles  [id number]                If you have any questions or concerns regarding this patient's care, please contact us.    Thank you for your referral.      Electronically signed by: Jamarcus Araiza PT

## 2022-06-13 NOTE — DISCHARGE SUMMARY
[facility] Discharge Summary      Patient ID: Andi To    MRN: [id number]     Acct:  [de-identified]       Patient's PCP: AFSANEH Payton CNP    Admit Date: 6/6/2022     Discharge Date: 6/12/2022      Admitting Physician: Ilda Hooper MD    Discharge Physician: Ilda Hooper MD     Discharge Diagnoses:    Primary Problem  Hyperglycemia    Active Hospital Problems    Diagnosis Date Noted    Hyperglycemia [R73.9] 06/06/2022     Priority: Medium    End-stage renal disease ([address] [N18.6] 02/14/2022    Anxiety [F41.9] 09/30/2021    Disequilibrium syndrome [E87.8] 09/17/2021    Ischemic stroke of frontal lobe ([facility] [I63.9] 08/11/2021    Essential hypertension [I10] 05/03/2021    Obesity, Class II, BMI 35-39.9 [E66.9] 04/07/2021    Iron deficiency anemia [D50.9] 08/31/2020    Diabetic polyneuropathy associated with type 2 diabetes mellitus ([address] [E11.42] 02/17/2020    Chronic diastolic heart failure ([address] [I50.32] 09/20/2018    Type 2 diabetes 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, with long-term current use of insulin ([address] [E11.22, N18.6, Z99.2, Z79.4] 09/20/2018    Spinal stenosis of lumbar region with neurogenic claudication [M48.062] 12/27/2016    Mixed hyperlipidemia [E78.2] 07/27/2016     Past Medical History:   Diagnosis Date    Backache, unspecified     CHF (congestive heart failure) ([address]     Chronic kidney disease     Coronary atherosclerosis of artery bypass graft     COVID 1/31/2022    Cramp of limb     Gallstones     Hypertension     Insomnia     Pneumonia     Type II or unspecified type diabetes mellitus with renal manifestations, not stated as uncontrolled(250.40)     Type II or unspecified type diabetes mellitus without mention of complication, not stated as uncontrolled     Unspecified vitamin D deficiency      The patient was seen and examined on day of discharge and this discharge summary is in conjunction with any daily progress note from day of discharge. Code Status:  Prior    Hospital Course: uncomplicated, it was discovered that the patient had not been filling her insulin regularly and it resulted in higher blood sugars. Consults:  nephrology and urology    Significant Diagnostic Studies: as above, and as follows: see chart    Treatments: as above    Disposition: home with home care    Discharged Condition: Stable    Follow Up:  AFSANEH Payton CNP in one week    Discharge Medications:      Medication List      START taking these medications    ciprofloxacin 500 mg tablet  Commonly known as: CIPRO  Take 1 tablet by mouth 2 times daily for 7 days        CHANGE how you take these medications    Basaglar KwikPen 100 UNIT/ML injection pen  Generic drug: insulin glargine  Inject 50 Units into the skin 2 times daily  What changed:   · how much to take  · Another medication with the same name was removed. Continue taking this medication, and follow the directions you see here.      busPIRone 7.5 MG tablet  Commonly known as: BUSPAR  Take 1 tablet by mouth 3 times daily  What changed: how much to take        CONTINUE taking these medications    acetaminophen 325 mg tablet  Commonly known as: TYLENOL     aspirin 81 MG chewable tablet  Take 1 tablet by mouth daily     atorvastatin 80 MG tablet  Commonly known as: LIPITOR  Take 1 tablet by mouth daily     carvedilol 3.125 MG tablet  Commonly known as: COREG     docusate sodium 100 mg capsule  Commonly known as: COLACE  Take 1 capsule by mouth 2 times daily     Easy Touch Pen Needles 29G X 12MM Misc  Generic drug: Insulin Pen Needle  1 each by Does not apply route daily     febuxostat 40 MG Tabs tablet  Commonly known as: ULORIC  Take 1 tablet by mouth daily     furosemide 80 MG tablet  Commonly known as: LASIX  Take 1 tablet by mouth 2 times daily     HumaLOG 100 UNIT/ML Soln injection vial  Generic drug: insulin lispro     * Lancets Misc  1 each by Does not apply route daily     * ReliOn Lancets Micro-Thin 33G Misc     lidocaine-prilocaine 2.5-2.5 % cream  Commonly known as: EMLA     melatonin 10 MG Caps capsule     miconazole 2 % powder  Commonly known as: MICOTIN  Apply topically 2 times daily. pantoprazole 40 MG tablet  Commonly known as: PROTONIX  Take 1 tablet by mouth every morning (before breakfast)     ranolazine 1000 MG extended release tablet  Commonly known as: RANEXA  Take 1 tablet by mouth 2 times daily     sodium bicarbonate 650 MG tablet  Take 1 tablet by mouth 3 times daily     tamsulosin 0.4 mg capsule  Commonly known as: FLOMAX  Take 1 capsule by mouth daily     traZODone 50 MG tablet  Commonly known as: DESYREL  TAKE 1 & 1/2 (ONE & ONE-HALF) TABLETS BY MOUTH NIGHTLY     True Metrix Go Glucose Meter w/Device Kit  1 each by Does not apply route 4 times daily         * This list has 2 medication(s) that are the same as other medications prescribed for you. Read the directions carefully, and ask your doctor or other care provider to review them with you. Where to Get Your Medications      These medications were sent to [address]    Phone: [phone]   · Basaglar KwikPen 100 UNIT/ML injection pen  · ciprofloxacin 500 mg tablet          Activity: activity as tolerated    Diet: diabetic diet    Time Spent on discharge is more than 40 minutes in the examination, evaluation, counseling and review of medications and discharge plan.       Electronically signed by Ilda Hooper MD on 6/12/2022 at 8:30 PM

## 2022-06-14 ENCOUNTER — CARE COORDINATION (OUTPATIENT)
Dept: OTHER | Facility: CLINIC | Age: 62
End: 2022-06-14

## 2022-06-14 DIAGNOSIS — E87.6 HYPOKALEMIA: Primary | ICD-10-CM

## 2022-06-14 NOTE — CARE COORDINATION
[facility] Transitions Initial Follow Up Call    Call within 2 business days of discharge: Yes    Patient: Qasim Mathews Patient : 1960   MRN: [id number]  Reason for Admission: Hyperglycemia; Hypokalemia  Discharge Date: 22 RARS: Readmission Risk Score: 33.1 ( )      Last Discharge [facility]       Complaint Diagnosis Description Type Department Provider    22 Hyperglycemia Hyperglycemia . .. ED to Hosp-Admission (Discharged) (ADMITTED) Carmen Maldonado MD; Laisha Ham. .. Spoke with[place] [address] [facility]    Spoke to Yani Reddy for initial transitions call. Pt is waiting for transportation to go to Dialysis, goes T//Sat. Pt has PCP appt tomorrow, uses [employer] transportation. Medications reviewed. Stated she is on Cipro for UTI, had a littler \"residue\" in urine last night but none today. Denies burning, hematuria, pressure. Pt takes Basaglar 50 U bid, takes Humalog on s/s tid. FBS was 114 this morning. Pt has lost @ 36 lbs in past 2 months. Stated she feels nauseated a lot. Denies vomiting, diarrhea. Advised to discuss with PCP at appt tomorrow for possible referral to GI. Pt is taking a turkey sandwich to Dialysis today, trying to make herself eat more nutritious meals. Pt is agreeable to Dietician referral.     Stated left leg is sl swollen, chronic issue r/t past surgery and broken bone. Pt has aide M-F at home, helps with meal prep, light household chores. Pt is agreeable to f/u transitions calls. Challenges to be reviewed by the provider   Additional needs identified to be addressed with provider: No  none             Method of communication with provider : none      Care Transition Nurse contacted the patient by telephone to perform post hospital discharge assessment. Verified name and  with patient as identifiers. Provided introduction to self, and explanation of the CTN role.      CTN reviewed discharge instructions, medical action plan and red flags with patient who verbalized understanding. Patient given an opportunity to ask questions and does not have any further questions or concerns at this time. Were discharge instructions available to patient? Yes. Reviewed appropriate site of care based on symptoms and resources available to patient including: PCP  Specialist  Home health  When to call [address] The patient agrees to contact the PCP office for questions related to their healthcare. Medication reconciliation was performed with patient, who verbalizes understanding of administration of home medications. Advised obtaining a 90-day supply of all daily and as-needed medications. Was patient discharged with a pulse oximeter? no    CTN provided contact information. Plan for follow-up call in 5-7 days based on severity of symptoms and risk factors. Plan for next call: symptom management-nausea, referral to dietician; FBS?  follow up appointment-PCP review 6/15/22. New meds? Referral for nausea?       Care Transitions 24 Hour Call    Do you have a copy of your discharge instructions?: Yes  Do you have all of your prescriptions and are they filled?: Yes  Have you been contacted by a [address]?: No  Have you scheduled your follow up appointment?: Yes  How are you going to get to your appointment?: Other (Comment: [facility])  Patient Home Equipment: Other (Comment: pulse ox)  Do you have support at home?: Parent(s)  Do you feel like you have everything you need to keep you well at home?: Yes  Care Transitions Interventions    Registered Dietician: Completed           Follow Up  Future Appointments   Date Time Provider Balbir Palacios   6/15/2022  1:15 PM Mackenzie Castañeda, APRN - [address] [facility]   7/5/2022  2:30 PM Elena Hickman MD [facility]   7/29/2022 11:00 AM Mckenzie Yung MD Resp Spec [id number]   8/11/2022  2:20 PM Sydnie Meza MD Neuro Spec Lamar Condon, RN

## 2022-06-15 ENCOUNTER — CARE COORDINATION (OUTPATIENT)
Dept: OTHER | Facility: CLINIC | Age: 62
End: 2022-06-15

## 2022-06-15 NOTE — CARE COORDINATION
[facility] Transitions Follow Up     6/15/2022    Patient: Tanvi Evans  Patient : 1960   MRN: [id number]  Reason for Admission: Hyperglycemia, hypokalemia  Discharge Date: 22 RARS: Readmission Risk Score: 33.1 ( )    Noted pt has PCP appt today, has Dialysis on T/Th/Sat. Will defer transitions call to later date.     Follow Up  Future Appointments   Date Time Provider Balbir Palacios   6/15/2022  1:15 PM AFSANEH Monzon - CNP [place] [address] [place] [address]   2022  2:30 PM Bruce Kaufman MD [address] [place] [address]   2022 11:00 AM Feli Sow MD Resp Spec [address]   2022  2:20 PM Patito Alcala MD Neuro Spec Marvella Moritz, RN

## 2022-06-18 ENCOUNTER — APPOINTMENT (OUTPATIENT)
Dept: CT IMAGING | Age: 62
DRG: 640 | End: 2022-06-18
Payer: COMMERCIAL

## 2022-06-18 ENCOUNTER — APPOINTMENT (OUTPATIENT)
Dept: GENERAL RADIOLOGY | Age: 62
DRG: 640 | End: 2022-06-18
Payer: COMMERCIAL

## 2022-06-18 ENCOUNTER — HOSPITAL ENCOUNTER (INPATIENT)
Age: 62
LOS: 1 days | Discharge: HOME HEALTH | DRG: 640 | End: 2022-06-19
Attending: EMERGENCY MEDICINE | Admitting: INTERNAL MEDICINE
Payer: COMMERCIAL

## 2022-06-18 DIAGNOSIS — M54.42 CHRONIC MIDLINE LOW BACK PAIN WITH BILATERAL SCIATICA: ICD-10-CM

## 2022-06-18 DIAGNOSIS — M48.062 SPINAL STENOSIS OF LUMBAR REGION WITH NEUROGENIC CLAUDICATION: ICD-10-CM

## 2022-06-18 DIAGNOSIS — E87.6 HYPOKALEMIA: Primary | ICD-10-CM

## 2022-06-18 DIAGNOSIS — E11.42 DIABETIC POLYNEUROPATHY ASSOCIATED WITH TYPE 2 DIABETES MELLITUS (HCC): ICD-10-CM

## 2022-06-18 DIAGNOSIS — M54.41 CHRONIC MIDLINE LOW BACK PAIN WITH BILATERAL SCIATICA: ICD-10-CM

## 2022-06-18 DIAGNOSIS — E66.9 OBESITY, CLASS II, BMI 35-39.9: ICD-10-CM

## 2022-06-18 DIAGNOSIS — G89.29 CHRONIC MIDLINE LOW BACK PAIN WITH BILATERAL SCIATICA: ICD-10-CM

## 2022-06-18 DIAGNOSIS — N18.6 END-STAGE RENAL DISEASE (HCC): ICD-10-CM

## 2022-06-18 PROBLEM — E87.8 DIALYSIS DISEQUILIBRIUM SYNDROME: Status: ACTIVE | Noted: 2022-06-18

## 2022-06-18 LAB
ABSOLUTE EOS #: 0.13 K/UL (ref 0–0.4)
ABSOLUTE IMMATURE GRANULOCYTE: 0 K/UL (ref 0–0.3)
ABSOLUTE LYMPH #: 0.53 K/UL (ref 1–4.8)
ABSOLUTE MONO #: 0.33 K/UL (ref 0.1–0.8)
ALBUMIN SERPL-MCNC: 3.6 G/DL (ref 3.5–5.2)
ALBUMIN/GLOBULIN RATIO: 1.2 (ref 1–2.5)
ALP BLD-CCNC: 91 U/L (ref 35–104)
ALT SERPL-CCNC: 14 U/L (ref 5–33)
AMMONIA: 19 UMOL/L (ref 11–51)
ANION GAP SERPL CALCULATED.3IONS-SCNC: 14 MMOL/L (ref 9–17)
AST SERPL-CCNC: 22 U/L
BASOPHILS # BLD: 0 % (ref 0–2)
BASOPHILS ABSOLUTE: 0 K/UL (ref 0–0.2)
BETA-HYDROXYBUTYRATE: 0.82 MMOL/L (ref 0.02–0.27)
BILIRUB SERPL-MCNC: 0.82 MG/DL (ref 0.3–1.2)
BUN BLDV-MCNC: 10 MG/DL (ref 8–23)
CALCIUM SERPL-MCNC: 8.9 MG/DL (ref 8.6–10.4)
CHLORIDE BLD-SCNC: 96 MMOL/L (ref 98–107)
CO2: 26 MMOL/L (ref 20–31)
CREAT SERPL-MCNC: 1.42 MG/DL (ref 0.5–0.9)
EOSINOPHILS RELATIVE PERCENT: 4 % (ref 1–4)
GFR AFRICAN AMERICAN: 45 ML/MIN
GFR NON-AFRICAN AMERICAN: 37 ML/MIN
GFR SERPL CREATININE-BSD FRML MDRD: ABNORMAL ML/MIN/{1.73_M2}
GLUCOSE BLD-MCNC: 175 MG/DL (ref 70–99)
GLUCOSE BLD-MCNC: 274 MG/DL (ref 65–105)
HCT VFR BLD CALC: 47.8 % (ref 36.3–47.1)
HEMOGLOBIN: 15.3 G/DL (ref 11.9–15.1)
IMMATURE GRANULOCYTES: 0 %
LYMPHOCYTES # BLD: 16 % (ref 24–44)
MAGNESIUM: 1.8 MG/DL (ref 1.6–2.6)
MAGNESIUM: 1.9 MG/DL (ref 1.6–2.6)
MCH RBC QN AUTO: 30.4 PG (ref 25.2–33.5)
MCHC RBC AUTO-ENTMCNC: 32 G/DL (ref 28.4–34.8)
MCV RBC AUTO: 95 FL (ref 82.6–102.9)
MONOCYTES # BLD: 10 % (ref 1–7)
MORPHOLOGY: ABNORMAL
NRBC AUTOMATED: 0 PER 100 WBC
PDW BLD-RTO: 17 % (ref 11.8–14.4)
PLATELET # BLD: 101 K/UL (ref 138–453)
PMV BLD AUTO: 10.7 FL (ref 8.1–13.5)
POTASSIUM SERPL-SCNC: 2.6 MMOL/L (ref 3.7–5.3)
PRO-BNP: 864 PG/ML
RBC # BLD: 5.03 M/UL (ref 3.95–5.11)
SARS-COV-2, RAPID: NOT DETECTED
SEG NEUTROPHILS: 70 % (ref 36–66)
SEGMENTED NEUTROPHILS ABSOLUTE COUNT: 2.31 K/UL (ref 1.8–7.7)
SODIUM BLD-SCNC: 136 MMOL/L (ref 135–144)
SPECIMEN DESCRIPTION: NORMAL
TOTAL PROTEIN: 6.7 G/DL (ref 6.4–8.3)
TROPONIN, HIGH SENSITIVITY: 79 NG/L (ref 0–14)
TROPONIN, HIGH SENSITIVITY: 81 NG/L (ref 0–14)
TSH SERPL DL<=0.05 MIU/L-ACNC: 0.49 UIU/ML (ref 0.3–5)
WBC # BLD: 3.3 K/UL (ref 3.5–11.3)

## 2022-06-18 PROCEDURE — 80053 COMPREHEN METABOLIC PANEL: CPT

## 2022-06-18 PROCEDURE — 84484 ASSAY OF TROPONIN QUANT: CPT

## 2022-06-18 PROCEDURE — 6360000002 HC RX W HCPCS: Performed by: STUDENT IN AN ORGANIZED HEALTH CARE EDUCATION/TRAINING PROGRAM

## 2022-06-18 PROCEDURE — 82010 KETONE BODYS QUAN: CPT

## 2022-06-18 PROCEDURE — 83735 ASSAY OF MAGNESIUM: CPT

## 2022-06-18 PROCEDURE — 82947 ASSAY GLUCOSE BLOOD QUANT: CPT

## 2022-06-18 PROCEDURE — 2060000000 HC ICU INTERMEDIATE R&B

## 2022-06-18 PROCEDURE — G0378 HOSPITAL OBSERVATION PER HR: HCPCS

## 2022-06-18 PROCEDURE — 96367 TX/PROPH/DG ADDL SEQ IV INF: CPT

## 2022-06-18 PROCEDURE — 73590 X-RAY EXAM OF LOWER LEG: CPT

## 2022-06-18 PROCEDURE — 96374 THER/PROPH/DIAG INJ IV PUSH: CPT

## 2022-06-18 PROCEDURE — 6360000002 HC RX W HCPCS

## 2022-06-18 PROCEDURE — 96366 THER/PROPH/DIAG IV INF ADDON: CPT

## 2022-06-18 PROCEDURE — 6370000000 HC RX 637 (ALT 250 FOR IP): Performed by: STUDENT IN AN ORGANIZED HEALTH CARE EDUCATION/TRAINING PROGRAM

## 2022-06-18 PROCEDURE — 84443 ASSAY THYROID STIM HORMONE: CPT

## 2022-06-18 PROCEDURE — 96372 THER/PROPH/DIAG INJ SC/IM: CPT

## 2022-06-18 PROCEDURE — 96375 TX/PRO/DX INJ NEW DRUG ADDON: CPT

## 2022-06-18 PROCEDURE — 99285 EMERGENCY DEPT VISIT HI MDM: CPT

## 2022-06-18 PROCEDURE — 71045 X-RAY EXAM CHEST 1 VIEW: CPT

## 2022-06-18 PROCEDURE — 83880 ASSAY OF NATRIURETIC PEPTIDE: CPT

## 2022-06-18 PROCEDURE — 96376 TX/PRO/DX INJ SAME DRUG ADON: CPT

## 2022-06-18 PROCEDURE — 2580000003 HC RX 258

## 2022-06-18 PROCEDURE — 1200000000 HC SEMI PRIVATE

## 2022-06-18 PROCEDURE — 73610 X-RAY EXAM OF ANKLE: CPT

## 2022-06-18 PROCEDURE — 87635 SARS-COV-2 COVID-19 AMP PRB: CPT

## 2022-06-18 PROCEDURE — 85025 COMPLETE CBC W/AUTO DIFF WBC: CPT

## 2022-06-18 PROCEDURE — 6370000000 HC RX 637 (ALT 250 FOR IP)

## 2022-06-18 PROCEDURE — 96365 THER/PROPH/DIAG IV INF INIT: CPT

## 2022-06-18 PROCEDURE — 70450 CT HEAD/BRAIN W/O DYE: CPT

## 2022-06-18 PROCEDURE — 82140 ASSAY OF AMMONIA: CPT

## 2022-06-18 PROCEDURE — 93005 ELECTROCARDIOGRAM TRACING: CPT | Performed by: STUDENT IN AN ORGANIZED HEALTH CARE EDUCATION/TRAINING PROGRAM

## 2022-06-18 RX ORDER — ASPIRIN 81 MG/1
81 TABLET, CHEWABLE ORAL DAILY
Status: DISCONTINUED | OUTPATIENT
Start: 2022-06-19 | End: 2022-06-19 | Stop reason: HOSPADM

## 2022-06-18 RX ORDER — CARVEDILOL 3.12 MG/1
3.12 TABLET ORAL 2 TIMES DAILY
Status: DISCONTINUED | OUTPATIENT
Start: 2022-06-18 | End: 2022-06-19 | Stop reason: HOSPADM

## 2022-06-18 RX ORDER — ACETAMINOPHEN 325 MG/1
650 TABLET ORAL EVERY 6 HOURS PRN
Status: DISCONTINUED | OUTPATIENT
Start: 2022-06-18 | End: 2022-06-19 | Stop reason: HOSPADM

## 2022-06-18 RX ORDER — FENTANYL CITRATE 50 UG/ML
50 INJECTION, SOLUTION INTRAMUSCULAR; INTRAVENOUS ONCE
Status: COMPLETED | OUTPATIENT
Start: 2022-06-18 | End: 2022-06-18

## 2022-06-18 RX ORDER — INSULIN LISPRO 100 [IU]/ML
0-12 INJECTION, SOLUTION INTRAVENOUS; SUBCUTANEOUS
Status: DISCONTINUED | OUTPATIENT
Start: 2022-06-19 | End: 2022-06-19 | Stop reason: HOSPADM

## 2022-06-18 RX ORDER — ONDANSETRON 4 MG/1
4 TABLET, ORALLY DISINTEGRATING ORAL EVERY 8 HOURS PRN
Status: DISCONTINUED | OUTPATIENT
Start: 2022-06-18 | End: 2022-06-19 | Stop reason: HOSPADM

## 2022-06-18 RX ORDER — CYCLOBENZAPRINE HCL 5 MG
5 TABLET ORAL ONCE
Status: COMPLETED | OUTPATIENT
Start: 2022-06-18 | End: 2022-06-18

## 2022-06-18 RX ORDER — POLYETHYLENE GLYCOL 3350 17 G/17G
17 POWDER, FOR SOLUTION ORAL DAILY PRN
Status: DISCONTINUED | OUTPATIENT
Start: 2022-06-18 | End: 2022-06-19 | Stop reason: HOSPADM

## 2022-06-18 RX ORDER — ACETAMINOPHEN 650 MG/1
650 SUPPOSITORY RECTAL EVERY 6 HOURS PRN
Status: DISCONTINUED | OUTPATIENT
Start: 2022-06-18 | End: 2022-06-19 | Stop reason: HOSPADM

## 2022-06-18 RX ORDER — DEXTROSE MONOHYDRATE 50 MG/ML
100 INJECTION, SOLUTION INTRAVENOUS PRN
Status: DISCONTINUED | OUTPATIENT
Start: 2022-06-18 | End: 2022-06-19 | Stop reason: HOSPADM

## 2022-06-18 RX ORDER — SODIUM CHLORIDE 0.9 % (FLUSH) 0.9 %
5-40 SYRINGE (ML) INJECTION EVERY 12 HOURS SCHEDULED
Status: DISCONTINUED | OUTPATIENT
Start: 2022-06-18 | End: 2022-06-19 | Stop reason: HOSPADM

## 2022-06-18 RX ORDER — POTASSIUM CHLORIDE 7.45 MG/ML
10 INJECTION INTRAVENOUS
Status: DISCONTINUED | OUTPATIENT
Start: 2022-06-18 | End: 2022-06-18

## 2022-06-18 RX ORDER — LIDOCAINE 4 G/G
1 PATCH TOPICAL DAILY
Status: DISCONTINUED | OUTPATIENT
Start: 2022-06-18 | End: 2022-06-19 | Stop reason: HOSPADM

## 2022-06-18 RX ORDER — RANOLAZINE 500 MG/1
1000 TABLET, EXTENDED RELEASE ORAL 2 TIMES DAILY
Status: DISCONTINUED | OUTPATIENT
Start: 2022-06-18 | End: 2022-06-19 | Stop reason: HOSPADM

## 2022-06-18 RX ORDER — SODIUM BICARBONATE 650 MG/1
650 TABLET ORAL 3 TIMES DAILY
Status: DISCONTINUED | OUTPATIENT
Start: 2022-06-18 | End: 2022-06-19 | Stop reason: HOSPADM

## 2022-06-18 RX ORDER — TRAZODONE HYDROCHLORIDE 50 MG/1
75 TABLET ORAL NIGHTLY
Status: DISCONTINUED | OUTPATIENT
Start: 2022-06-18 | End: 2022-06-19 | Stop reason: HOSPADM

## 2022-06-18 RX ORDER — BUSPIRONE HYDROCHLORIDE 10 MG/1
10 TABLET ORAL 3 TIMES DAILY
Status: DISCONTINUED | OUTPATIENT
Start: 2022-06-18 | End: 2022-06-19 | Stop reason: HOSPADM

## 2022-06-18 RX ORDER — INSULIN GLARGINE 100 [IU]/ML
10 INJECTION, SOLUTION SUBCUTANEOUS NIGHTLY
Status: DISCONTINUED | OUTPATIENT
Start: 2022-06-18 | End: 2022-06-19

## 2022-06-18 RX ORDER — ONDANSETRON 2 MG/ML
4 INJECTION INTRAMUSCULAR; INTRAVENOUS EVERY 6 HOURS PRN
Status: DISCONTINUED | OUTPATIENT
Start: 2022-06-18 | End: 2022-06-19 | Stop reason: HOSPADM

## 2022-06-18 RX ORDER — HEPARIN SODIUM 5000 [USP'U]/ML
5000 INJECTION, SOLUTION INTRAVENOUS; SUBCUTANEOUS EVERY 8 HOURS SCHEDULED
Status: DISCONTINUED | OUTPATIENT
Start: 2022-06-18 | End: 2022-06-19 | Stop reason: HOSPADM

## 2022-06-18 RX ORDER — MAGNESIUM SULFATE IN WATER 40 MG/ML
2000 INJECTION, SOLUTION INTRAVENOUS ONCE
Status: COMPLETED | OUTPATIENT
Start: 2022-06-18 | End: 2022-06-19

## 2022-06-18 RX ORDER — POTASSIUM CHLORIDE 20 MEQ/1
40 TABLET, EXTENDED RELEASE ORAL ONCE
Status: COMPLETED | OUTPATIENT
Start: 2022-06-18 | End: 2022-06-18

## 2022-06-18 RX ORDER — INSULIN LISPRO 100 [IU]/ML
0-6 INJECTION, SOLUTION INTRAVENOUS; SUBCUTANEOUS NIGHTLY
Status: DISCONTINUED | OUTPATIENT
Start: 2022-06-18 | End: 2022-06-19 | Stop reason: HOSPADM

## 2022-06-18 RX ORDER — ATORVASTATIN CALCIUM 80 MG/1
80 TABLET, FILM COATED ORAL DAILY
Status: DISCONTINUED | OUTPATIENT
Start: 2022-06-19 | End: 2022-06-19 | Stop reason: HOSPADM

## 2022-06-18 RX ORDER — SODIUM CHLORIDE 9 MG/ML
INJECTION, SOLUTION INTRAVENOUS PRN
Status: DISCONTINUED | OUTPATIENT
Start: 2022-06-18 | End: 2022-06-19 | Stop reason: HOSPADM

## 2022-06-18 RX ORDER — SODIUM CHLORIDE 0.9 % (FLUSH) 0.9 %
5-40 SYRINGE (ML) INJECTION PRN
Status: DISCONTINUED | OUTPATIENT
Start: 2022-06-18 | End: 2022-06-19 | Stop reason: HOSPADM

## 2022-06-18 RX ADMIN — TRAZODONE HYDROCHLORIDE 75 MG: 50 TABLET ORAL at 22:31

## 2022-06-18 RX ADMIN — INSULIN LISPRO 3 UNITS: 100 INJECTION, SOLUTION INTRAVENOUS; SUBCUTANEOUS at 23:00

## 2022-06-18 RX ADMIN — SODIUM CHLORIDE, PRESERVATIVE FREE 10 ML: 5 INJECTION INTRAVENOUS at 22:36

## 2022-06-18 RX ADMIN — SODIUM BICARBONATE 650 MG: 648 TABLET ORAL at 22:31

## 2022-06-18 RX ADMIN — RANOLAZINE 1000 MG: 500 TABLET, FILM COATED, EXTENDED RELEASE ORAL at 22:30

## 2022-06-18 RX ADMIN — ACETAMINOPHEN 650 MG: 325 TABLET ORAL at 22:56

## 2022-06-18 RX ADMIN — POTASSIUM CHLORIDE 10 MEQ: 10 INJECTION, SOLUTION INTRAVENOUS at 20:05

## 2022-06-18 RX ADMIN — POTASSIUM BICARBONATE 40 MEQ: 782 TABLET, EFFERVESCENT ORAL at 17:15

## 2022-06-18 RX ADMIN — MAGNESIUM SULFATE 2000 MG: 2 INJECTION INTRAVENOUS at 22:58

## 2022-06-18 RX ADMIN — INSULIN GLARGINE 10 UNITS: 100 INJECTION, SOLUTION SUBCUTANEOUS at 23:00

## 2022-06-18 RX ADMIN — POTASSIUM CHLORIDE 10 MEQ: 10 INJECTION, SOLUTION INTRAVENOUS at 18:56

## 2022-06-18 RX ADMIN — POTASSIUM CHLORIDE 40 MEQ: 1500 TABLET, EXTENDED RELEASE ORAL at 23:42

## 2022-06-18 RX ADMIN — BUSPIRONE HYDROCHLORIDE 10 MG: 10 TABLET ORAL at 23:32

## 2022-06-18 RX ADMIN — HEPARIN SODIUM 5000 UNITS: 5000 INJECTION INTRAVENOUS; SUBCUTANEOUS at 22:35

## 2022-06-18 RX ADMIN — CYCLOBENZAPRINE HYDROCHLORIDE 5 MG: 5 TABLET, FILM COATED ORAL at 23:32

## 2022-06-18 RX ADMIN — POTASSIUM CHLORIDE 10 MEQ: 10 INJECTION, SOLUTION INTRAVENOUS at 17:38

## 2022-06-18 RX ADMIN — FENTANYL CITRATE 50 MCG: 50 INJECTION, SOLUTION INTRAMUSCULAR; INTRAVENOUS at 19:30

## 2022-06-18 ASSESSMENT — PAIN SCALES - GENERAL
PAINLEVEL_OUTOF10: 9
PAINLEVEL_OUTOF10: 6
PAINLEVEL_OUTOF10: 9

## 2022-06-18 ASSESSMENT — PAIN DESCRIPTION - DESCRIPTORS
DESCRIPTORS: SPASM
DESCRIPTORS: ACHING
DESCRIPTORS: SPASM

## 2022-06-18 ASSESSMENT — PAIN DESCRIPTION - LOCATION
LOCATION: LEG
LOCATION: BACK

## 2022-06-18 ASSESSMENT — ENCOUNTER SYMPTOMS
DIARRHEA: 0
SHORTNESS OF BREATH: 0
APNEA: 0
WHEEZING: 0
CHEST TIGHTNESS: 0
CONSTIPATION: 0
ABDOMINAL DISTENTION: 0
ABDOMINAL PAIN: 0
NAUSEA: 0
VOMITING: 0
COUGH: 0

## 2022-06-18 ASSESSMENT — PAIN - FUNCTIONAL ASSESSMENT: PAIN_FUNCTIONAL_ASSESSMENT: 0-10

## 2022-06-18 ASSESSMENT — PAIN DESCRIPTION - ORIENTATION: ORIENTATION: RIGHT;LEFT

## 2022-06-18 ASSESSMENT — PAIN DESCRIPTION - FREQUENCY: FREQUENCY: INTERMITTENT

## 2022-06-18 NOTE — ED PROVIDER NOTES
[facility]  ED  Emergency Department Encounter  EmergencyMedicine Resident     Pt Name:Estefany Garvin  MRN: [id number]  Deidragfjaylene 1960  Date of evaluation: 6/18/22  PCP:  AFSANEH Fisher [id number]       Chief Complaint   Patient presents with    Altered Mental Status       HISTORY OF PRESENT ILLNESS  (Location/Symptom, Timing/Onset, Context/Setting, Quality, Duration, Modifying Factors, Severity.)      Ranjith Hill is a 57 y.o. female who presents with altered mental status, syncope. Patient was at dialysis earlier today when she had a syncopal episode, patient now significantly confused, disoriented to self, place and time. Patient unable to answer any review of systems questioning due to confusion. Patient stating that she does not know her name, where she has or what happened earlier today. Completely amnestic to events. Patient is awake, alert but disoriented. No obvious outward signs of trauma. Vital signs stable for EMS. PAST MEDICAL / SURGICAL / SOCIAL / FAMILY HISTORY      has a past medical history of Backache, unspecified, CHF (congestive heart failure) ([facility] [address] Chronic kidney disease, Coronary atherosclerosis of artery bypass graft, COVID, Cramp of limb, Gallstones, Hypertension, Insomnia, Pneumonia, Type II or unspecified type diabetes mellitus with renal manifestations, not stated as uncontrolled(250.40), Type II or unspecified type diabetes mellitus without mention of complication, not stated as uncontrolled, and Unspecified vitamin D deficiency. has a past surgical history that includes Coronary artery bypass graft; Knee arthroscopy; Carpal tunnel release; Breast surgery; Tonsillectomy; Hand surgery; Ankle fracture surgery; Cholecystectomy, open (N/A); IR TUNNELED CVC PLACE WO SQ PORT/PUMP > 5 YEARS (8/18/2021); AV fistula creation (12/14/2021);  Dialysis fistula creation (Left, 12/14/2021); and other surgical history (04/06/2022). Social History     Socioeconomic History    Marital status: Single     Spouse name: Not on file    Number of children: Not on file    Years of education: Not on file    Highest education level: Not on file   Occupational History    Not on file   Tobacco Use    Smoking status: Never Smoker    Smokeless tobacco: Never Used   Vaping Use    Vaping Use: Never used   Substance and Sexual Activity    Alcohol use: No    Drug use: No    Sexual activity: Not Currently   Other Topics Concern    Not on file   Social History Narrative    Not on file     Social Determinants of Health     Financial Resource Strain: Low Risk     Difficulty of Paying Living Expenses: Not very hard   Food Insecurity: No Food Insecurity    Worried About Running Out of Food in the Last Year: Never true    Nany of Food in the Last Year: Never true   Transportation Needs: No Transportation Needs    Lack of Transportation (Medical): No    Lack of Transportation (Non-Medical): No   Physical Activity: Inactive    Days of Exercise per Week: 0 days    Minutes of Exercise per Session: 0 min   Stress: Stress Concern Present    Feeling of Stress :  To some extent   Social Connections: Socially Isolated    Frequency of Communication with Friends and Family: More than three times a week    Frequency of Social Gatherings with Friends and Family: More than three times a week    Attends Worship Services: Never    Active Member of Clubs or Organizations: No    Attends Club or Organization Meetings: Never    Marital Status: Never    Intimate Partner Violence:     Fear of Current or Ex-Partner: Not on file    Emotionally Abused: Not on file    Physically Abused: Not on file    Sexually Abused: Not on file   Housing Stability: [address] Unable to Pay for Housing in the Last Year: No    Number of [place] in the Last Year: 1    Unstable Housing in the Last Year: No       Family History   Problem Relation Age of Onset    Diabetes Father     Heart Failure Father        Allergies:  Adhesive tape, Ace inhibitors, Iv dye [iodides], Nsaids, and Metformin and related    Home Medications:  Prior to Admission medications    Medication Sig Start Date End Date Taking? Authorizing Provider   carvedilol (COREG) 3.125 MG tablet Take 1 tablet by mouth 2 times daily 6/15/22 7/15/22  AFSANEH Ordonez CNP   insulin glargine [facility]) 100 UNIT/ML injection pen Inject 50 Units into the skin 2 times daily 6/11/22   Moriah Sanchez MD   miconazole (MICOTIN) 2 % powder Apply topically 2 times daily. 4/20/22   Fidelia Ball MD   melatonin 10 MG CAPS capsule Take 10 mg by mouth nightly    Historical Provider, MD   lidocaine-prilocaine (EMLA) 2.5-2.5 % cream Apply topically as needed for Pain Indications: Apply to dialysis site Apply topically as needed.      Historical Provider, MD   traZODone (DESYREL) 50 MG tablet TAKE 1 & 1/2 (ONE & ONE-HALF) TABLETS BY MOUTH NIGHTLY 4/11/22   AFSANEH Ordonez CNP   acetaminophen (TYLENOL) 325 MG tablet Take 650 mg by mouth every 6 hours as needed for Pain     Historical Provider, MD   furosemide (LASIX) 80 MG tablet Take 1 tablet by mouth 2 times daily 3/8/22   AFSANEH Ordonez CNP   sodium bicarbonate 650 MG tablet Take 1 tablet by mouth 3 times daily 3/2/22   [facility], MD   Insulin Pen Needle (EASY TOUCH PEN NEEDLES) 29G X 12MM MISC 1 each by Does not apply route daily 3/1/22   AFSANEH Ordonez CNP   ReliOn Lancets Micro-Thin 33G MISC USE AS DIRECTED EVERY DAY 1/28/22   Historical Provider, MD   HUMALOG 100 UNIT/ML injection vial Inject into the skin 3 times daily (before meals) Indications: 15 units and sliding scale (patient reports only the sliding scale)  11/29/21   Historical Provider, MD   Blood Glucose Monitoring Suppl (TRUE METRIX GO GLUCOSE METER) w/Device KIT 1 each by Does not apply route 4 times daily 11/30/21   AFSANEH Ordonez CNP   Lancets MISC 1 each by Does not apply route daily 11/30/21   AFSANEH Caballero CNP   febuxostat (ULORIC) 40 MG TABS tablet Take 1 tablet by mouth daily 11/22/21   Katheryn Kulkarni MD   docusate sodium (COLACE) 100 MG capsule Take 1 capsule by mouth 2 times daily 11/22/21   Katheryn Kulkarni MD   tamsulosin [facility]) 0.4 MG capsule Take 1 capsule by mouth daily 11/23/21   Katheryn Kulkarni MD   atorvastatin (LIPITOR) 80 MG tablet Take 1 tablet by mouth daily 11/3/21   AFSANEH Caballero CNP   aspirin 81 MG chewable tablet Take 1 tablet by mouth daily 9/25/21   Juno Watt MD   ranolazine (RANEXA) 1000 MG extended release tablet Take 1 tablet by mouth 2 times daily 9/25/21   Juno Watt MD   busPIRone (BUSPAR) 7.5 MG tablet Take 1 tablet by mouth 3 times daily  Patient taking differently: Take 10 mg by mouth 3 times daily  9/25/21   Juno Watt MD   pantoprazole (PROTONIX) 40 MG tablet Take 1 tablet by mouth every morning (before breakfast) 9/25/21   Juno Watt MD   allopurinol (ZYLOPRIM) 100 MG tablet Take 1 tablet by mouth daily 4/14/21   AFSANEH Caballero - CNP       REVIEW OF SYSTEMS    (2-9 systems for level 4, 10 or more for level 5)      Review of Systems   Unable to perform ROS: Mental status change       PHYSICAL EXAM   (up to 7 for level 4, 8 or more for level 5)      INITIAL VITALS:   There were no vitals taken for this visit. Physical Exam  Constitutional:       Appearance: She is ill-appearing. Comments: Tearful, confused   Eyes:      Extraocular Movements: Extraocular movements intact. Pupils: Pupils are equal, round, and reactive to light. Cardiovascular:      Rate and Rhythm: Normal rate. Heart sounds: No murmur heard. No friction rub. No gallop. Pulmonary:      Breath sounds: No wheezing, rhonchi or rales. Abdominal:      General: There is no distension. Tenderness: There is no abdominal tenderness. Skin:     Findings: No erythema or rash. Neurological:      Mental Status: She is alert. She is disoriented. GCS: GCS eye subscore is 4. GCS verbal subscore is 4. GCS motor subscore is 6. Cranial Nerves: No cranial nerve deficit or dysarthria. Sensory: No sensory deficit. Motor: No weakness. Psychiatric:         Mood and Affect: Mood is anxious. DIFFERENTIAL  DIAGNOSIS     PLAN (LABS / IMAGING / EKG):  Orders Placed This Encounter   Procedures    CT HEAD WO CONTRAST    XR CHEST PORTABLE    CBC with Auto Differential    Comprehensive Metabolic Panel    Ammonia    TSH with Reflex    Troponin    Brain Natriuretic Peptide    Beta-Hydroxybutyrate    EKG 12 Lead       MEDICATIONS ORDERED:  No orders of the defined types were placed in this encounter. DDX: Hypotension, cardiogenic syncope, dialysis disequilibrium, metabolic encephalopathy, subdural hematoma, epidural hematoma, subarachnoid hemorrhage, intracranial mass    DIAGNOSTIC RESULTS / EMERGENCY DEPARTMENT COURSE / MDM   LAB RESULTS:  No results found for this visit on 06/18/22. IMPRESSION: 24-year-old female who arrives via EMS after syncopal episode during dialysis. Patient is confused, disoriented, tearful appearing. Vital signs within normal limits. Patient in no acute distress but is significantly disoriented to self, place, time. She is amnestic to events earlier today. Plan for CT head, metabolic encephalopathy work-up. Possible admission. RADIOLOGY:  CT ABDOMEN PELVIS WO CONTRAST Additional Contrast? None    Result Date: 6/10/2022  EXAMINATION: CT OF THE ABDOMEN AND PELVIS WITHOUT CONTRAST 6/10/2022 4:23 pm TECHNIQUE: CT of the abdomen and pelvis was performed without the administration of intravenous contrast. Multiplanar reformatted images are provided for review. Automated exposure control, iterative reconstruction, and/or weight based adjustment of the mA/kV was utilized to reduce the radiation dose to as low as reasonably achievable. COMPARISON: None. HISTORY: ORDERING SYSTEM PROVIDED HISTORY: flank pain, UTI TECHNOLOGIST PROVIDED HISTORY: flank pain, UTI Reason for Exam: flank pain, UTI, nausea Relevant Medical/Surgical History: dialysis, cholecystectomy FINDINGS: Lower Chest: The heart is enlarged. The visualized lung bases are grossly clear. A hemodialysis catheter ends in the upper right atrium. Trace left pleural effusion. Organs: The spleen is mildly enlarged and measures 14.8 cm in length, although is homogeneous in appearance. The liver has a slightly nodular surface contour which suggests cirrhosis. The gallbladder is surgically absent. The pancreas, kidneys, and adrenal glands have an unremarkable unenhanced CT appearance. Extensive renal arterial vascular calcifications. No renal cortical edema or perinephric inflammatory fat stranding. No renal, ureteral, or bladder calculi. No hydronephrosis or hydroureter. GI/Bowel: The stomach and the small and large bowel loops are normal in caliber, contour, and morphology, without acute or significant abnormality. No dilated loops or areas of bowel wall thickening. The appendix is normal. Peritoneum/Retroperitoneum: There is no free fluid or extraluminal gas. No enlarged or suspicious mesenteric or retroperitoneal lymphadenopathy. The abdominal aorta and iliac arteries are normal in caliber. Pelvis: No pelvic free fluid or enlarged or suspicious pelvic or inguinal lymphadenopathy. No appreciable uterine or adnexal abnormality. Mild diffuse urinary bladder wall thickening as well as a mild degree of surrounding inflammatory fat stranding, findings suggestive of acute cystitis. The pelvic bowel loops are unremarkable. Bones/Soft Tissues: Degenerative and post-surgical changes are present within the lumbar spine. No acute fracture. Mild diffuse urinary bladder wall thickening with surrounding inflammation, findings most likely representing acute cystitis.   No findings of acute pyelonephritis. Splenomegaly. Suspected cirrhotic liver morphology. No ascites fluid. Trace left pleural effusion. CT HEAD WO CONTRAST    Result Date: 6/18/2022  EXAMINATION: CT OF THE HEAD WITHOUT CONTRAST  6/18/2022 1:20 pm TECHNIQUE: CT of the head was performed without the administration of intravenous contrast. Automated exposure control, iterative reconstruction, and/or weight based adjustment of the mA/kV was utilized to reduce the radiation dose to as low as reasonably achievable. COMPARISON: 12/24/2021 HISTORY: ORDERING SYSTEM PROVIDED HISTORY: Confusion after dialysis, syncope TECHNOLOGIST PROVIDED HISTORY: Confusion after dialysis, syncope Decision Support Exception - unselect if not a suspected or confirmed emergency medical condition->Emergency Medical Condition (MA) Reason for Exam: Confusion after dialysis, syncope FINDINGS: BRAIN/VENTRICLES: The cerebral hemispheres, brainstem, and cerebellum have a normal appearance for the patient's age. The falx is midline. The ventricles and peripheral sulci are mildly dilated. There is decreased attenuation in the periventricular white matter. There is no sign of a space occupying lesion, infarction, or hemorrhage. Orbits: Portion of the orbits demonstrate no acute abnormality. SINUSES: . The  imaged portions of the paranasal sinuses are clear. The mastoids and the middle ear chambers are clear. SOFT TISSUES/SKULL:  No acute abnormality of the visualized skull or soft tissues. Vascular calcifications are seen compatible with atherosclerotic disease. Mild central and cortical cerebral atrophy. Mild chronic deep white matter ischemic changes No acute intracranial abnormalities are noted.      XR CHEST PORTABLE    Result Date: 6/18/2022  EXAMINATION: ONE XRAY VIEW OF THE CHEST 6/18/2022 12:59 pm COMPARISON: 04/26/2022 HISTORY: ORDERING SYSTEM PROVIDED HISTORY: Syncope, recent dialysis TECHNOLOGIST PROVIDED HISTORY: Syncope, recent dialysis FINDINGS: There is a rightcentral venous catheter with the tip in the right atrium. Cardiac size is enlarged. No acute infiltrates are seen . The pulmonary vascularity is hazy and indistinct. No pneumothorax. No pleural effusions identified . Postsurgical changes overlying the mediastinum and cervical spine. Cardiomegaly with associated mild pulmonary vascular congestion       EKG  EKG Interpretation    Interpreted by me    Rhythm: normal sinus   Rate: normal  Axis: normal  Ectopy: none  Conduction: Poor R wave progression, prolonged   ST Segments: no acute change  T Waves: Isolated inversion in lead V3, flattening V4  Q Waves: none    Clinical Impression: Nonspecific EKG    All EKG's are interpreted by the Emergency Department Physician who either signs or Co-signs this chart in the absence of a cardiologist.    EMERGENCY DEPARTMENT COURSE:    Labs remarkable for hypokalemia. Potassium repleted in emergency department. Patient was evaluated by neurology, no need for further work-up from their standpoint at this time. Patient admitted to internal medicine service hypokalemia, transient disorientation. On reevaluation patient has more awake, alert, oriented. Patient with complaints of left ankle pain at this time. Left ankle x-ray, no evidence of acute fracture or other osseous abnormality however there is mild widening suggestive of possible ligamentous injury. Internal medicine updated on findings. PROCEDURES:  None    CONSULTS:  None    CRITICAL CARE:  Please see attending note    FINAL IMPRESSION      1. Hypokalemia  2. Hyperglycemia    DISPOSITION / PLAN     DISPOSITION        PATIENT REFERRED TO:  No follow-up provider specified.     DISCHARGE MEDICATIONS:  New Prescriptions    No medications on file       Tyrone Eli DO  Emergency Medicine Resident    (Please note that portions of thisnote were completed with a voice recognition program.  Efforts were made to edit the dictations but occasionally words are mis-transcribed.)        Sabrina Bran,   Resident  06/18/22 [id number] Frank[address] Alfredo,   Resident  06/23/22 [id number]

## 2022-06-18 NOTE — ED PROVIDER NOTES
[facility]  Emergency Department  Faculty Attestation     I performed a history and physical examination of the patient and discussed management with the resident. I reviewed the residents note and agree with the documented findings and plan of care. Any areas of disagreement are noted on the chart. I was personally present for the key portions of any procedures. I have documented in the chart those procedures where I was not present during the key portions. I have reviewed the emergency nurses triage note. I agree with the chief complaint, past medical history, past surgical history, allergies, medications, social and family history as documented unless otherwise noted below. For Physician Assistant/ Nurse Practitioner cases/documentation I have personally evaluated this patient and have completed at least one if not all key elements of the E/M (history, physical exam, and MDM). Additional findings are as noted.       Primary Care Physician:  AFSANEH Jamil - CNP    Screenings:  [unfilled]    CHIEF COMPLAINT       Chief Complaint   Patient presents with    Altered Mental Status       RECENT VITALS:   Temp: 98 °F (36.7 °C),  Heart Rate: 68, Resp: 17, BP: (!) 125/52    LABS:  Labs Reviewed   CBC WITH AUTO DIFFERENTIAL - Abnormal; Notable for the following components:       Result Value    WBC 3.3 (*)     Hemoglobin 15.3 (*)     Hematocrit 47.8 (*)     RDW 17.0 (*)     Platelets 500 (*)     All other components within normal limits   COMPREHENSIVE METABOLIC PANEL   AMMONIA   TSH WITH REFLEX   TROPONIN   TROPONIN   BRAIN NATRIURETIC PEPTIDE   BETA-HYDROXYBUTYRATE       Radiology  XR CHEST PORTABLE   Final Result   Cardiomegaly with associated mild pulmonary vascular congestion         CT HEAD WO CONTRAST    (Results Pending)       CRITICAL CARE: There was a high probability of clinically significant/life threatening deterioration in this patient's condition which required my urgent intervention. Total critical care time was 10 minutes. This excludes any time for separately reportable procedures. EKG:   EKG Interpretation    Interpreted by me    Rhythm: normal sinus   Rate: normal  Axis: normal  Ectopy: none  Conduction: normal  ST Segments: no acute change  T Waves: Inversion anterolaterally  Q Waves: none    Clinical Impression: Nonspecific T wave changes, probable old anterior MI    Attending Physician Additional  Notes    Patient is brought by EMS for altered mentation during dialysis. She has had this at least twice before where she has confusion and altered mentation during dialysis. She had 50 minutes left and became suddenly confused. She is anxious tearful and does not know who she is or where she is at. She is able to follow commands and has no other strokelike symptoms. No abnormal vital signs. Normal blood sugar. She denies headache chest pain abdominal pain nausea diplopia weakness. She does not know her name or address why she is here where she is or other orientation questions. On exam she is nontoxic afebrile vital signs are normal.  Normal pupils and extraocular movements. Face is symmetrical.  Normal finger-nose movements. Negative drift. Normal sensation light touch. Skin is warm and dry. Normal capillary refill. No signs of trauma. No edema. Impression is dialysis disequilibrium, unlikely stroke, consider transient global amnesia although she is not classic for this presentation. Plan is laboratory studies, CT brain, reassess, neurology input, will discuss with nephrology regarding follow-up dialysis treatments. Nela Bradley.  Tabatha Capone MD, [facility]  Attending Emergency  Physician               Michael Mercado MD  06/18/22 [id number]

## 2022-06-18 NOTE — ED NOTES
Pt arrives to ED via EMS after being picked up from dialysis clinic after passing out and then waking up with altered mental status. Pt was unable to state her name, time, placed, or situation. Pt has been placed on full cardiac monitor, EKG completed, blood specimen obtained and sent to lab.       Omar Molina RN  06/18/22 [id number]

## 2022-06-18 NOTE — H&P
[address]     Department of Internal Medicine - Staff Internal Medicine Teaching Service          ADMISSION NOTE/HISTORY AND PHYSICAL EXAMINATION   Date: 6/18/2022  Patient Name: Terry Galeas  Date of admission: 6/18/2022  3:53 PM  YOB: 1960  PCP: AFSANEH Alvarado - CNP  History Obtained From:  patient, electronic medical record    CHIEF COMPLAINT     Chief complaint: Altered mental status, syncope    HISTORY OF PRESENTING ILLNESS     The patient is a pleasant 57 y.o. female with PMH significant of  -Chronic respiratory failure (on 3 L O2 via NC),  -Diastolic CHF  -ESRD on TTS  -Dialysis disequilibrium syndrome  -History of DVT (not on anticoagulation)  -CAD s/p CABG  -CKD,  -Type II DM with episodes of DKA in past  -Morbid obesity  -HTN, HLP  -Recent admission to rehab after being admitted for DKA and UTI. -Was brought in with the complaint of altered mental status and lightheadedness. She was undergoing dialysis today as per the schedule, her systolic blood pressure dropped dropped down to 80s, she had lightheadedness. Her blood pressure improved slightly with midodrine but she became significantly confused and disoriented, and was therefore brought in [facility] for further work-up and treatment. In the ER,  She was awake and alert, following commands but disoriented to self, time or place and could not recall previous events or past medical history. She appeared extremely anxious and stressed about her memory deficits. As per the sister, the patient had similar 3 -4 episodes in the past months during dialysis, she became hypotensive and was unable to recall her name or anything else. Patient gradually came back to normal over few hours to few days. Subsequent episodes were relatively mild and patient recovered faster. No reported episodes of dialysis disequilibrium since November 2021.     She was hemodynamically stable but appeared confused, disoriented and tearful. EKG showed poor R wave progression, prolonged , isolated inversion in V3 and flattening of V4. Nonspecific EKG changes. Neurology was consulted in the ER for the evaluation of altered mental status. Review of Systems   Constitutional: Positive for activity change, appetite change and fatigue. Negative for chills, diaphoresis, fever and unexpected weight change. HENT: Negative. Negative for congestion, facial swelling, hearing loss, postnasal drip, rhinorrhea, sinus pain, tinnitus and voice change. Eyes: Negative. Negative for photophobia, pain, discharge, redness, itching and visual disturbance. Respiratory: Negative. Negative for apnea, cough, choking, chest tightness, shortness of breath, wheezing and stridor. Cardiovascular: Positive for leg swelling. Negative for chest pain and palpitations. Gastrointestinal: Positive for nausea. Negative for abdominal distention, abdominal pain, constipation, diarrhea and vomiting. Endocrine: Negative. Negative for cold intolerance, heat intolerance and polyuria. Genitourinary: Negative. Negative for difficulty urinating, dysuria, frequency and urgency. Musculoskeletal: Positive for arthralgias, back pain, gait problem and myalgias. Skin: Negative. Negative for color change, pallor, rash and wound. Allergic/Immunologic: Negative. Neurological: Positive for dizziness, syncope and light-headedness. Negative for tremors, seizures, facial asymmetry, speech difficulty, weakness, numbness and headaches. Hematological: Negative. Negative for adenopathy. Does not bruise/bleed easily. Psychiatric/Behavioral: Positive for confusion and decreased concentration. Negative for agitation, behavioral problems, dysphoric mood, hallucinations, self-injury, sleep disturbance and suicidal ideas. The patient is nervous/anxious. The patient is not hyperactive.           PAST MEDICAL HISTORY     Past Medical History: Diagnosis Date    Backache, unspecified     CHF (congestive heart failure) (HCC)     Chronic kidney disease     Coronary atherosclerosis of artery bypass graft     COVID 1/31/2022    Cramp of limb     Gallstones     Hypertension     Insomnia     Pneumonia     Type II or unspecified type diabetes mellitus with renal manifestations, not stated as uncontrolled(250.40)     Type II or unspecified type diabetes mellitus without mention of complication, not stated as uncontrolled     Unspecified vitamin D deficiency        PAST SURGICAL HISTORY     Past Surgical History:   Procedure Laterality Date    ANKLE FRACTURE SURGERY      AV FISTULA CREATION  12/14/2021    BREAST SURGERY      CARPAL TUNNEL RELEASE      CHOLECYSTECTOMY, OPEN N/A     CORONARY ARTERY BYPASS GRAFT      x3    DIALYSIS FISTULA CREATION Left 12/14/2021    LEFT AV FISTULA CREATION UPPER EXTREMITY performed by Checo Mcpherson MD at [address] [facility] IR TUNNELED CATHETER PLACEMENT GREATER THAN 5 YEARS  8/18/2021    IR TUNNELED CATHETER PLACEMENT GREATER THAN 5 YEARS 8/18/2021 [facility] SPECIAL PROCEDURES    KNEE ARTHROSCOPY      right    OTHER SURGICAL HISTORY  04/06/2022    cvc exchange    TONSILLECTOMY         ALLERGIES     Adhesive tape, Ace inhibitors, Iv dye [iodides], Nsaids, and Metformin and related    MEDICATIONS PRIOR TO ADMISSION     Prior to Admission medications    Medication Sig Start Date End Date Taking? Authorizing Provider   carvedilol (COREG) 3.125 MG tablet Take 1 tablet by mouth 2 times daily 6/15/22 7/15/22  AFSANEH Ya - CNP   insulin glargine [facility]) 100 UNIT/ML injection pen Inject 50 Units into the skin 2 times daily 6/11/22   Jeane Almodovar MD   miconazole (MICOTIN) 2 % powder Apply topically 2 times daily.  4/20/22   Kirstin Hamlin MD   melatonin 10 MG CAPS capsule Take 10 mg by mouth nightly    Historical Provider, MD   lidocaine-prilocaine (EMLA) 2.5-2.5 % cream Apply topically as needed for Pain Indications: Apply to dialysis site Apply topically as needed.      Historical Provider, MD   traZODone (DESYREL) 50 MG tablet TAKE 1 & 1/2 (ONE & ONE-HALF) TABLETS BY MOUTH NIGHTLY 4/11/22   AFSANEH Valencia CNP   acetaminophen (TYLENOL) 325 MG tablet Take 650 mg by mouth every 6 hours as needed for Pain     Historical Provider, MD   furosemide (LASIX) 80 MG tablet Take 1 tablet by mouth 2 times daily 3/8/22   AFSANEH Valencia CNP   sodium bicarbonate 650 MG tablet Take 1 tablet by mouth 3 times daily 3/2/22   Nisha Spaulding MD   Insulin Pen Needle (EASY TOUCH PEN NEEDLES) 29G X 12MM MISC 1 each by Does not apply route daily 3/1/22   AFSANEH Valencia CNP   ReliOn Lancets Micro-Thin 33G MISC USE AS DIRECTED EVERY DAY 1/28/22   Historical Provider, MD   HUMALOG 100 UNIT/ML injection vial Inject into the skin 3 times daily (before meals) Indications: 15 units and sliding scale (patient reports only the sliding scale)  11/29/21   Historical Provider, MD   Blood Glucose Monitoring Suppl (TRUE METRIX GO GLUCOSE METER) w/Device KIT 1 each by Does not apply route 4 times daily 11/30/21   AFSANEH Valencia CNP   Lancets MISC 1 each by Does not apply route daily 11/30/21   AFSANEH Valencia CNP   febuxostat (ULORIC) 40 MG TABS tablet Take 1 tablet by mouth daily 11/22/21   Terri Farfan MD   docusate sodium (COLACE) 100 MG capsule Take 1 capsule by mouth 2 times daily 11/22/21   Terri Farfan MD   tamsulosin [facility]) 0.4 MG capsule Take 1 capsule by mouth daily 11/23/21   Terri Farfan MD   atorvastatin (LIPITOR) 80 MG tablet Take 1 tablet by mouth daily 11/3/21   AFSANEH Valencia CNP   aspirin 81 MG chewable tablet Take 1 tablet by mouth daily 9/25/21   Noel Natarajan MD   ranolazine (RANEXA) 1000 MG extended release tablet Take 1 tablet by mouth 2 times daily 9/25/21   Noel Natarajan MD   busPIRone (BUSPAR) 7.5 MG tablet Take 1 tablet by mouth 3 times daily  Patient taking differently: Take 10 mg by mouth 3 times daily  21   Haris Cerda MD   pantoprazole (PROTONIX) 40 MG tablet Take 1 tablet by mouth every morning (before breakfast) 21   Haris Cerda MD   allopurinol (ZYLOPRIM) 100 MG tablet Take 1 tablet by mouth daily 21   Myra Ritchie APRN - CNP       SOCIAL HISTORY     Tobacco: Patient denies  Alcohol: Patient denies  Illicits: Patient denies      FAMILY HISTORY     Family History   Problem Relation Age of Onset    Diabetes Father     Heart Failure Father        PHYSICAL EXAM     Vitals: BP (!) 125/52   Pulse 68   Temp 98 °F (36.7 °C) (Oral)   Resp 17   SpO2 95%   Tmax: Temp (24hrs), Av °F (36.7 °C), Min:98 °F (36.7 °C), Max:98 °F (36.7 °C)    Last Body weight:   Wt Readings from Last 3 Encounters:   06/15/22 218 lb (98.9 kg)   22 219 lb 2.2 oz (99.4 kg)   22 261 lb 3.9 oz (118.5 kg)     Body Mass Index : There is no height or weight on file to calculate BMI. Physical Exam  Vitals and nursing note reviewed. Constitutional:       General: She is not in acute distress. Appearance: Normal appearance. She is obese. She is not ill-appearing, toxic-appearing or diaphoretic. HENT:      Head: Normocephalic and atraumatic. Right Ear: Tympanic membrane, ear canal and external ear normal. There is no impacted cerumen. Left Ear: Tympanic membrane, ear canal and external ear normal. There is no impacted cerumen. Nose: Nose normal. No congestion or rhinorrhea. Mouth/Throat:      Mouth: Mucous membranes are dry. Pharynx: Oropharynx is clear. No oropharyngeal exudate or posterior oropharyngeal erythema. Eyes:      General: No scleral icterus. Right eye: No discharge. Left eye: No discharge. Extraocular Movements: Extraocular movements intact. Conjunctiva/sclera: Conjunctivae normal.      Pupils: Pupils are equal, round, and reactive to light.    Neck:      Vascular: No carotid bruit. Cardiovascular:      Rate and Rhythm: Regular rhythm. Tachycardia present. Pulses: Normal pulses. Heart sounds: Normal heart sounds. No murmur heard. No friction rub. No gallop. Pulmonary:      Effort: Pulmonary effort is normal. No respiratory distress. Breath sounds: Normal breath sounds. No stridor. No wheezing, rhonchi or rales. Chest:      Chest wall: No tenderness. Abdominal:      General: Bowel sounds are normal. There is no distension. Palpations: Abdomen is soft. There is no mass. Tenderness: There is no abdominal tenderness. There is no right CVA tenderness, left CVA tenderness, guarding or rebound. Hernia: No hernia is present. Genitourinary:     Rectum: Normal. Guaiac result negative. Musculoskeletal:         General: No swelling, tenderness, deformity or signs of injury. Normal range of motion. Cervical back: Normal range of motion and neck supple. No rigidity or tenderness. Right lower leg: No edema. Left lower leg: No edema. Lymphadenopathy:      Cervical: No cervical adenopathy. Skin:     General: Skin is warm and dry. Capillary Refill: Capillary refill takes less than 2 seconds. Coloration: Skin is not jaundiced or pale. Findings: No bruising, erythema, lesion or rash. Neurological:      General: No focal deficit present. Mental Status: She is alert and oriented to person, place, and time. Mental status is at baseline. Cranial Nerves: No cranial nerve deficit. Sensory: No sensory deficit. Motor: No weakness. Coordination: Coordination normal.      Gait: Gait normal.      Deep Tendon Reflexes: Reflexes normal.   Psychiatric:         Mood and Affect: Mood normal.         Behavior: Behavior normal.         Thought Content:  Thought content normal.         Judgment: Judgment normal.            INVESTIGATIONS     Laboratory Testing:     Recent Results (from the past 24 hour(s))   EKG 12 Lead    Collection Time: 06/18/22  4:05 PM   Result Value Ref Range    Ventricular Rate 68 BPM    Atrial Rate 68 BPM    P-R Interval 154 ms    QRS Duration 100 ms    Q-T Interval 502 ms    QTc Calculation (Bazett) 533 ms    P Axis 64 degrees    R Axis 9 degrees    T Axis 106 degrees   Troponin    Collection Time: 06/18/22  4:18 PM   Result Value Ref Range    Troponin, High Sensitivity 81 (HH) 0 - 14 ng/L   CBC with Auto Differential    Collection Time: 06/18/22  4:19 PM   Result Value Ref Range    WBC 3.3 (L) 3.5 - 11.3 k/uL    RBC 5.03 3.95 - 5.11 m/uL    Hemoglobin 15.3 (H) 11.9 - 15.1 g/dL    Hematocrit 47.8 (H) 36.3 - 47.1 %    MCV 95.0 82.6 - 102.9 fL    MCH 30.4 25.2 - 33.5 pg    MCHC 32.0 28.4 - 34.8 g/dL    RDW 17.0 (H) 11.8 - 14.4 %    Platelets 382 (L) 908 - 453 k/uL    MPV 10.7 8.1 - 13.5 fL    NRBC Automated 0.0 0.0 per 100 WBC    Immature Granulocytes 0 0 %    Seg Neutrophils 70 (H) 36 - 66 %    Lymphocytes 16 (L) 24 - 44 %    Monocytes 10 (H) 1 - 7 %    Eosinophils % 4 1 - 4 %    Basophils 0 0 - 2 %    Absolute Immature Granulocyte 0.00 0.00 - 0.30 k/uL    Segs Absolute 2.31 1.8 - 7.7 k/uL    Absolute Lymph # 0.53 (L) 1.0 - 4.8 k/uL    Absolute Mono # 0.33 0.1 - 0.8 k/uL    Absolute Eos # 0.13 0.0 - 0.4 k/uL    Basophils Absolute 0.00 0.0 - 0.2 k/uL    Morphology ANISOCYTOSIS PRESENT    Comprehensive Metabolic Panel    Collection Time: 06/18/22  4:19 PM   Result Value Ref Range    Glucose 175 (H) 70 - 99 mg/dL    BUN 10 8 - 23 mg/dL    CREATININE 1.42 (H) 0.50 - 0.90 mg/dL    Calcium 8.9 8.6 - 10.4 mg/dL    Sodium 136 135 - 144 mmol/L    Potassium 2.6 (LL) 3.7 - 5.3 mmol/L    Chloride 96 (L) 98 - 107 mmol/L    CO2 26 20 - 31 mmol/L    Anion Gap 14 9 - 17 mmol/L    Alkaline Phosphatase 91 35 - 104 U/L    ALT 14 5 - 33 U/L    AST 22 <32 U/L    Total Bilirubin 0.82 0.3 - 1.2 mg/dL    Total Protein 6.7 6.4 - 8.3 g/dL    Albumin 3.6 3.5 - 5.2 g/dL    Albumin/Globulin Ratio 1.2 1.0 - 2.5    GFR Non- 37 (L) >60 mL/min    GFR  45 (L) >60 mL/min    GFR Comment         Ammonia    Collection Time: 06/18/22  4:19 PM   Result Value Ref Range    Ammonia 19 11 - 51 umol/L   TSH with Reflex    Collection Time: 06/18/22  4:19 PM   Result Value Ref Range    TSH 0.49 0.30 - 5.00 uIU/mL   Brain Natriuretic Peptide    Collection Time: 06/18/22  4:19 PM   Result Value Ref Range    Pro- (H) <300 pg/mL   Beta-Hydroxybutyrate    Collection Time: 06/18/22  4:19 PM   Result Value Ref Range    Beta-Hydroxybutyrate 0.82 (H) 0.02 - 0.27 mmol/L   Magnesium    Collection Time: 06/18/22  4:19 PM   Result Value Ref Range    Magnesium 1.9 1.6 - 2.6 mg/dL   Troponin    Collection Time: 06/18/22  5:27 PM   Result Value Ref Range    Troponin, High Sensitivity 79 (HH) 0 - 14 ng/L   Magnesium    Collection Time: 06/18/22  5:27 PM   Result Value Ref Range    Magnesium 1.8 1.6 - 2.6 mg/dL   COVID-19, Rapid    Collection Time: 06/18/22  5:28 PM    Specimen: Nasopharyngeal Swab   Result Value Ref Range    Specimen Description . NASOPHARYNGEAL SWAB     SARS-CoV-2, Rapid Not Detected Not Detected       Imaging:   CT HEAD WO CONTRAST    Result Date: 6/18/2022  Mild central and cortical cerebral atrophy. Mild chronic deep white matter ischemic changes No acute intracranial abnormalities are noted.      XR CHEST PORTABLE    Result Date: 6/18/2022  Cardiomegaly with associated mild pulmonary vascular congestion       ASSESSMENT & PLAN       Principal Problem:    Hypokalemia  Active Problems:    Dialysis disequilibrium syndrome    Hyperglycemia    Chronic diastolic heart failure (HCC)    Diabetic polyneuropathy associated with type 2 diabetes mellitus ([facility]    History of coronary artery bypass graft    Mixed hyperlipidemia    Type 2 diabetes 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, with long-term current use of insulin (HCC)    Essential hypertension    Generalized weakness    Anxiety    AMS (altered mental status)    End-stage renal disease ([facility]  Resolved Problems:    * No resolved hospital problems. *      IMPRESSION  This is a 57 y.o. female who presented with altered mental status and lightheadedness during dialysis and found to have acute metabolic encephalopathy likely secondary to dialysis disequilibrium syndrome along with hypokalemia and hypomagnesemia. Patient admitted to inpatient status to evaluate and manage the same. Acute metabolic encephalopathy:  -Likely secondary to dialysis disequilibrium syndrome vs less likely seizure or stroke vs hypotension vs electrolyte abnormalities. Neurology on board, appreciate recommendations. -CT head Wo contrast showed mild central and cortical cerebral atrophy. Mild chronic deep white matter ischemic changes. No acute intracranial abnormalities noted. -Monitor for gradual recovery. -If no improvement, plan for LTME and MRI brain  -Replace electrolytes magnesium, potassium. Dialysis disequilibrium syndrome:  -Patient's mentation is improving on repeat evaluation  -Nephrology consulted for the diagnosis and prevention of further dialysis disequilibrium syndrome episodes,  -Patient is encouraged to be compliant with hemodialysis. ESRD  -Started on TTS since August 2021  -Likely secondary to diabetic nephropathy    Essential hypertension  -Chronic diastolic heart failure   -On furosemide 80 mg twice daily at home.  -Will resume as appropriate.     Hypokalemia  -Potassium 2.6, replacement ordered    Hypomagnesemia  -Magnesium 1.8, 2 g replacement ordered    Diabetes mellitus  -On 25 units Lantus twice daily at home.  -Will start on 15 units now and advance as appropriate.  -On medium dose sliding scale  -Hypoglycemia protocol in place      Diabetic polyneuropathy  -Continue gabapentin 300 mg 3 times daily    Coronary artery disease   -s/p CABG  -Continue aspirin 81 mg, Lipitor 80 mg, Coreg 3.125, and Ranexa 1000 mg twice daily    Morbid obesity  ERICK  -BiPAP during nighttime sleep and daytime naps. -Outpatient sleep study    Depression/bipolar disorder  -Resume buspirone 10 mg 3 times daily  -Resume trazodone 75 mg as needed nightly    DVT ppx  -On heparin 5000 3 times daily    GI ppx  -On omeprazole at home. Will resume as appropriate    PT/OT/SW  -Consulted    Discharge Planning  - to assist in discharge planning, likely discharge back home. Allie Foster MD  Internal Medicine Resident, PGY-1  [address], [place]  6/18/2022, 7:39 PM      Attestation and add on       I have discussed the care of Andi To , including pertinent history and exam findings,      6/19/22    with the resident. I have seen and examined the patient and the key elements of all parts of the encounter have been performed by me . I agree with the assessment, plan and orders as documented by the resident. Hospital Problems           Last Modified POA    * (Principal) Hypokalemia 6/19/2022 Yes    Hyperglycemia 6/19/2022 Yes    Dialysis disequilibrium syndrome 6/19/2022 Yes    Chronic diastolic heart failure ([address] 6/18/2022 Yes    Diabetic polyneuropathy associated with type 2 diabetes mellitus ([address] 6/18/2022 Yes    History of coronary artery bypass graft 6/18/2022 Yes    Mixed hyperlipidemia 6/19/2022 Yes    Type 2 diabetes 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, with long-term current use of insulin ([address] 6/18/2022 Yes    Essential hypertension 6/18/2022 Yes    Generalized weakness 6/19/2022 Yes    Anxiety 6/19/2022 Yes    AMS (altered mental status) 6/18/2022 Yes    End-stage renal disease ([address] 6/18/2022 Yes              ---- ;        Medications: Allergies:     Allergies   Allergen Reactions    Adhesive Tape Other (See Comments) and Rash     Other reaction(s): Unknown    Ace Inhibitors Other (See Comments)     Cough, states not good for her kidneys    Iv Dye [Iodides]      Stage 4 kidney disease    Nsaids      CANNOT TAKE D/T KIDNEY DISEASE    Metformin And Related Hives, Itching and Rash     Stage 4 kidney disease. Current Meds:   Scheduled Meds:    insulin glargine  25 Units SubCUTAneous BID    midodrine  2.5 mg Oral TID WC    gabapentin  300 mg Oral BID    sodium chloride flush  5-40 mL IntraVENous 2 times per day    heparin (porcine)  5,000 Units SubCUTAneous 3 times per day    aspirin  81 mg Oral Daily    atorvastatin  80 mg Oral Daily    traZODone  75 mg Oral Nightly    [Held by provider] carvedilol  3.125 mg Oral BID    insulin lispro  0-12 Units SubCUTAneous TID     insulin lispro  0-6 Units SubCUTAneous Nightly    sodium bicarbonate  650 mg Oral TID    ranolazine  1,000 mg Oral BID    busPIRone  10 mg Oral TID    lidocaine  1 patch TransDERmal Daily     Continuous Infusions:    sodium chloride      dextrose       PRN Meds: tiZANidine, sodium chloride flush, sodium chloride, ondansetron **OR** ondansetron, polyethylene glycol, acetaminophen **OR** acetaminophen, glucose, dextrose bolus **OR** dextrose bolus, glucagon (rDNA), dextrose      MD MOE De La Cruz [address], [address] [place] [address].    Phone [phone]   Fax: [phone]  Answering Service: [phone]

## 2022-06-18 NOTE — CARE COORDINATION
Case Management Initial Discharge Plan  Qasim Mathews             Met with:patient and sister Avinash Yoon to discuss discharge plans. Information verified: address, contacts, phone number, , insurance Yes  Insurance Provider: Teresa Height:     Emergency Contact/Next of Kin name & number: Jamal Trevizo (sister) [phone]  Who are involved in patient's support system? sisters    PCP: Cristiano Muir, AFSANEH Cesar CNP  Date of last visit: past week      Discharge Planning    Living Arrangements:    with mother    Home has 1 stories   stairs to climb to get into front door, stairs to climb to reach second floor  Location of bedroom/bathroom in home main    Patient able to perform ADL's:Independent    Current Services (outpatient & in home) [facility]  DME equipment: walker, wheelchair, shower chair, glucometer, O2  DME provider:     Is patient receiving oral anticoagulation therapy? No    If indicated:   Physician managing anticoagulation treatment:   Where does patient obtain lab work for ATC treatment? Does patient have any issues/concerns obtaining medications? No  If yes, what are patient's concerns? Is there a preferred Pharmacy after hours or on weekends? Yes    If yes, which pharmacy? [facility]    Potential Assistance Needed:       Patient agreeable to home care: Yes  Freedom of choice provided:  [facility] with [facility]    Prior SNF/Rehab Placement and Facility: [facility]  Agreeable to SNF/Rehab: No  Tyrone of choice provided: no     Evaluation: no    Expected Discharge date:       Patient expects to be discharged to: If home: is the family and/or caregiver wiling & able to provide support at home? Who will be providing this support?      Follow Up Appointment: Best Day/ Time:      Transportation provider: family  Transportation arrangements needed for discharge: No    Readmission Risk              Risk of Unplanned Readmission:  0 Does patient have a readmission risk score greater than 14?:   If yes, follow-up appointment must be made within 7 days of discharge.      Goals of Care:       Educated patient on transitional options, provided freedom of choice and are agreeable with plan      Discharge Plan: home with mother, current with [facility]          Electronically signed by Mariela Blanchard RN on 6/18/22 at 7:07 PM EDT

## 2022-06-18 NOTE — CONSULTS
[facility]   [address] [facility]    Neurology Consult Note            Date:   6/18/2022  Patient name:  Claude Cedillo  Date of admission:  6/18/2022  3:53 PM  MRN:   [id number]  Account:  [de-identified]  YOB: 1960  PCP:    AFSANEH Chauhan CNP  Room:   22/22  Code Status:    Prior    Chief Complaint:     Chief Complaint   Patient presents with    Altered Mental Status       History Obtained From:     patient, sister Bienvenido Shock), electronic medical record    History of Present Illness: The patient is a 57 y.o. female with significant past medical history of CHF, ESRD on dialysis, CAD, HTN, T2DM, dialysis disequilibrium syndrome, questionable right frontal infarct in August 2021 (at [address]), who presents with altered mental status and syncopal event. Patient was undergoing dialysis earlier today when her SBP dropped down to the 80s and she became syncopal.  Patient has midodrine ordered for blood pressure drops during dialysis, received midodrine and her pressure jumped up slightly but did not normalise. Subsequently patient became significantly confused and disoriented and was brought into [place] for further work-up and treatment. Since arrival patient has been unable to tell her name, where she is or answer any other questions regarding herself or her surroundings. She does not recall any further medical history. She is very tearful, breaks down into tears when asked any questions and appears extremely anxious and stressed about her memory loss. Patient repeatedly asks if and when she will get her memory back. Patient's sister was contacted for further information. According to the history patient had stroke back in August 2021 for which she was admitted to and treated at [facility], subsequently multiple neurologists have seen the patient and have refuted that the initial event was a stroke.   Patient had a very similar episode back in September 2021 when she was undergoing dialysis, became hypotensive and was unable to recall her name or anything else. Per the sister patient was unable to even recognize a cell phone. Patient gradually returned back to normal over a few days but then had a repeat episode in October and then another one in November, these 2 subsequent episodes were relatively mild and patient recovered faster. Patient has not had any further episodes of dialysis disequilibrium since November 2021. Though she has had multiple hospital admissions since then for DKA, UTIs and other issues. Patient has had prolonged hospital stays and was just recently discharged on 2 June from rehab which she required because of a 3-1/2-week hospital stay. Patient subsequently got admitted to the hospital again around 6 June for DKA and UTI, was discharged last Monday.     Past Medical History:     Past Medical History:   Diagnosis Date    Backache, unspecified     CHF (congestive heart failure) (HCC)     Chronic kidney disease     Coronary atherosclerosis of artery bypass graft     COVID 1/31/2022    Cramp of limb     Gallstones     Hypertension     Insomnia     Pneumonia     Type II or unspecified type diabetes mellitus with renal manifestations, not stated as uncontrolled(250.40)     Type II or unspecified type diabetes mellitus without mention of complication, not stated as uncontrolled     Unspecified vitamin D deficiency         Past Surgical History:     Past Surgical History:   Procedure Laterality Date    ANKLE FRACTURE SURGERY      AV FISTULA CREATION  12/14/2021    BREAST SURGERY      CARPAL TUNNEL RELEASE      CHOLECYSTECTOMY, OPEN N/A     CORONARY ARTERY BYPASS GRAFT      x3    DIALYSIS FISTULA CREATION Left 12/14/2021    LEFT AV FISTULA CREATION UPPER EXTREMITY performed by Carlos Aranda MD at [address] [facility]      IR TUNNELED CATHETER PLACEMENT GREATER THAN 5 YEARS 8/18/2021    IR TUNNELED CATHETER PLACEMENT GREATER THAN 5 YEARS 8/18/2021 [facility] PROCED[facility]    KNEE ARTHROSCOPY      right    OTHER SURGICAL HISTORY  04/06/2022    cvc exchange    TONSILLECTOMY          Medications Prior to Admission:     Prior to Admission medications    Medication Sig Start Date End Date Taking? Authorizing Provider   carvedilol (COREG) 3.125 MG tablet Take 1 tablet by mouth 2 times daily 6/15/22 7/15/22  AFSANEH Duong CNP   insulin glargine [facility]) 100 UNIT/ML injection pen Inject 50 Units into the skin 2 times daily 6/11/22   Margarito Reynoso MD   miconazole (MICOTIN) 2 % powder Apply topically 2 times daily. 4/20/22   Precious Streeter MD   melatonin 10 MG CAPS capsule Take 10 mg by mouth nightly    Historical Provider, MD   lidocaine-prilocaine (EMLA) 2.5-2.5 % cream Apply topically as needed for Pain Indications: Apply to dialysis site Apply topically as needed.      Historical Provider, MD   traZODone (DESYREL) 50 MG tablet TAKE 1 & 1/2 (ONE & ONE-HALF) TABLETS BY MOUTH NIGHTLY 4/11/22   AFSANEH Duong CNP   acetaminophen (TYLENOL) 325 MG tablet Take 650 mg by mouth every 6 hours as needed for Pain     Historical Provider, MD   furosemide (LASIX) 80 MG tablet Take 1 tablet by mouth 2 times daily 3/8/22   AFSANEH Duong CNP   sodium bicarbonate 650 MG tablet Take 1 tablet by mouth 3 times daily 3/2/22   [facility], MD   Insulin Pen Needle (EASY TOUCH PEN NEEDLES) 29G X 12MM MISC 1 each by Does not apply route daily 3/1/22   AFSANEH Duong CNP   ReliOn Lancets Micro-Thin 33G MISC USE AS DIRECTED EVERY DAY 1/28/22   Historical Provider, MD   HUMALOG 100 UNIT/ML injection vial Inject into the skin 3 times daily (before meals) Indications: 15 units and sliding scale (patient reports only the sliding scale)  11/29/21   Historical Provider, MD   Blood Glucose Monitoring Suppl (TRUE METRIX GO GLUCOSE METER) w/Device KIT 1 each by Does not apply route 4 times daily 11/30/21   AFSANEH Valencia CNP   Lancets MISC 1 each by Does not apply route daily 11/30/21   AFSANEH Valencia CNP   febuxostat (ULORIC) 40 MG TABS tablet Take 1 tablet by mouth daily 11/22/21   Terri Farfan MD   docusate sodium (COLACE) 100 MG capsule Take 1 capsule by mouth 2 times daily 11/22/21   Terri Farfan MD   tamsulosin [facility]) 0.4 MG capsule Take 1 capsule by mouth daily 11/23/21   Terri Farfan MD   atorvastatin (LIPITOR) 80 MG tablet Take 1 tablet by mouth daily 11/3/21   AFSANEH Valencia CNP   aspirin 81 MG chewable tablet Take 1 tablet by mouth daily 9/25/21   Noel Natarajan MD   ranolazine (RANEXA) 1000 MG extended release tablet Take 1 tablet by mouth 2 times daily 9/25/21   Noel Natarajan MD   busPIRone (BUSPAR) 7.5 MG tablet Take 1 tablet by mouth 3 times daily  Patient taking differently: Take 10 mg by mouth 3 times daily  9/25/21   Noel Natarajan MD   pantoprazole (PROTONIX) 40 MG tablet Take 1 tablet by mouth every morning (before breakfast) 9/25/21   Noel Natarajan MD   allopurinol (ZYLOPRIM) 100 MG tablet Take 1 tablet by mouth daily 4/14/21   AFSANEH Valencia CNP        Allergies:     Adhesive tape, Ace inhibitors, Iv dye [iodides], Nsaids, and Metformin and related    Social History:     Tobacco:    reports that she has never smoked. She has never used smokeless tobacco.  Alcohol:      reports no history of alcohol use. Drug Use:  reports no history of drug use. Family History:     Family History   Problem Relation Age of Onset    Diabetes Father     Heart Failure Father        Review of Systems:     Review of Systems   Constitutional: Negative for activity change, appetite change, chills, diaphoresis and fever. Respiratory: Negative for apnea, cough, chest tightness, shortness of breath and wheezing. Cardiovascular: Negative for chest pain and palpitations.    Gastrointestinal: Negative for abdominal distention, abdominal pain, constipation, diarrhea, nausea and vomiting. Genitourinary: Negative for difficulty urinating, dysuria and flank pain. Musculoskeletal: Positive for myalgias. Negative for arthralgias. Neurological: Negative for dizziness, light-headedness and headaches. Psychiatric/Behavioral: Positive for confusion. Negative for agitation. The patient is nervous/anxious. All other systems reviewed and are negative. Physical Exam:   BP (!) 125/52   Pulse 68   Temp 98 °F (36.7 °C) (Oral)   Resp 17   SpO2 95%   Temp (24hrs), Av °F (36.7 °C), Min:98 °F (36.7 °C), Max:98 °F (36.7 °C)    No results for input(s): POCGLU in the last 72 hours.   No intake or output data in the 24 hours ending 22 1726      Neurologic Exam    GENERAL  Appears comfortable, in distress due to memory issues   HEENT  NC/ AT   HEART  S1 and S2 heard; palpation of pulses: radial pulse    NECK  Supple and no bruits heard   MENTAL STATUS:  Alert, very confused, normal speech, normal language, no hallucination or delusion   CRANIAL NERVES: II     -      Visual fields intact to confrontation  III,IV,VI -  PERR, EOMs full, no ptosis  V     -     Normal facial sensation   VII    -     Normal facial symmetry  VIII   -     Intact hearing   IX,X -     Symmetrical palate  XI    -     Symmetrical shoulder shrug  XII   -     Midline tongue, no atrophy    MOTOR FUNCTION: RUE: Significant for good strength of grade 5/5 in proximal and distal muscle groups   LUE: Significant for good strength of grade 5/5 in proximal and distal muscle groups   RLE: Significant for good strength of grade 3/5 in proximal and distal muscle groups   LLE: Significant for good strength of grade 3/5 in proximal and distal muscle groups      Normal bulk, normal tone and no involuntary movements, no tremor   SENSORY FUNCTION:  Normal touch, normal pinprick, normal vibration, normal proprioception   CEREBELLAR FUNCTION:  Intact fine motor control over upper limbs and lower limbs   REFLEX FUNCTION:  Symmetric in upper and lower extremities, no Babinski sign   STATION and GAIT  deferred       Investigations:      Laboratory Testing:  Recent Results (from the past 24 hour(s))   Troponin    Collection Time: 06/18/22  4:18 PM   Result Value Ref Range    Troponin, High Sensitivity 81 (HH) 0 - 14 ng/L   CBC with Auto Differential    Collection Time: 06/18/22  4:19 PM   Result Value Ref Range    WBC 3.3 (L) 3.5 - 11.3 k/uL    RBC 5.03 3.95 - 5.11 m/uL    Hemoglobin 15.3 (H) 11.9 - 15.1 g/dL    Hematocrit 47.8 (H) 36.3 - 47.1 %    MCV 95.0 82.6 - 102.9 fL    MCH 30.4 25.2 - 33.5 pg    MCHC 32.0 28.4 - 34.8 g/dL    RDW 17.0 (H) 11.8 - 14.4 %    Platelets 445 (L) 119 - 453 k/uL    MPV 10.7 8.1 - 13.5 fL    NRBC Automated 0.0 0.0 per 100 WBC    Immature Granulocytes 0 0 %    Seg Neutrophils 70 (H) 36 - 66 %    Lymphocytes 16 (L) 24 - 44 %    Monocytes 10 (H) 1 - 7 %    Eosinophils % 4 1 - 4 %    Basophils 0 0 - 2 %    Absolute Immature Granulocyte 0.00 0.00 - 0.30 k/uL    Segs Absolute 2.31 1.8 - 7.7 k/uL    Absolute Lymph # 0.53 (L) 1.0 - 4.8 k/uL    Absolute Mono # 0.33 0.1 - 0.8 k/uL    Absolute Eos # 0.13 0.0 - 0.4 k/uL    Basophils Absolute 0.00 0.0 - 0.2 k/uL    Morphology ANISOCYTOSIS PRESENT    Comprehensive Metabolic Panel    Collection Time: 06/18/22  4:19 PM   Result Value Ref Range    Glucose 175 (H) 70 - 99 mg/dL    BUN 10 8 - 23 mg/dL    CREATININE 1.42 (H) 0.50 - 0.90 mg/dL    Calcium 8.9 8.6 - 10.4 mg/dL    Sodium 136 135 - 144 mmol/L    Potassium 2.6 (LL) 3.7 - 5.3 mmol/L    Chloride 96 (L) 98 - 107 mmol/L    CO2 26 20 - 31 mmol/L    Anion Gap 14 9 - 17 mmol/L    Alkaline Phosphatase 91 35 - 104 U/L    ALT 14 5 - 33 U/L    AST 22 <32 U/L    Total Bilirubin 0.82 0.3 - 1.2 mg/dL    Total Protein 6.7 6.4 - 8.3 g/dL    Albumin 3.6 3.5 - 5.2 g/dL    Albumin/Globulin Ratio 1.2 1.0 - 2.5    GFR Non- 37 (L) >60 mL/min    GFR  45 (L) >60 mL/min    GFR Comment         Ammonia    Collection Time: 06/18/22  4:19 PM   Result Value Ref Range    Ammonia 19 11 - 51 umol/L   TSH with Reflex    Collection Time: 06/18/22  4:19 PM   Result Value Ref Range    TSH 0.49 0.30 - 5.00 uIU/mL   Brain Natriuretic Peptide    Collection Time: 06/18/22  4:19 PM   Result Value Ref Range    Pro- (H) <300 pg/mL   Beta-Hydroxybutyrate    Collection Time: 06/18/22  4:19 PM   Result Value Ref Range    Beta-Hydroxybutyrate 0.82 (H) 0.02 - 0.27 mmol/L         Assessment :      Primary Problem  <principal problem not specified>    There are no active hospital problems to display for this patient. Patient is a 57 y.o. Non- / non  female with history of ESRD on dialysis, prior dialysis disequilibrium syndrome episodes, presents after experiencing syncopal event during dialysis and subsequently becoming altered and completely disoriented with no memory recall. Patient has had multiple similar episodes in the past, history obtained from sister in detail. We will hold off on EEG or MRI, will continue to monitor and reevaluate. Patient will require medicine admission for monitoring and metabolic issues. 1. Dialysis disequilibrium syndrome  2. ESRD on dialysis    Plan:       1. We will hold off on EEG at this time  2. Recent MRI brain from April 2021 was within normal limits  3. Considering course of patient's previous DDS episodes, expect patient to start improving gradually, continue to monitor. 4. In case patient continues to be disoriented with no improvement, will consider LTME and MRI brain  5. Patient requires medicine admission for medical management for elevated troponins, electrolyte abnormalities and dialysis disequilibrium syndrome  6. We will follow    Consultations:   IP CONSULT TO NEUROLOGY      Follow-up further recommendations after discussing the case with attending  The plan was discussed with the patient, patient's family and the medical staff. Patient is admitted as inpatient status because of co-morbidities listed above, severity of signs and symptoms as outlined, requirement for current medical therapies and most importantly because of direct risk to patient if care not provided in a hospital setting.     Aixa Acosta MD  6/18/2022  5:26 PM    Copy sent to Dr. Jade Quezada, APRN - CNP

## 2022-06-19 VITALS
HEART RATE: 70 BPM | SYSTOLIC BLOOD PRESSURE: 118 MMHG | DIASTOLIC BLOOD PRESSURE: 52 MMHG | TEMPERATURE: 98.1 F | RESPIRATION RATE: 15 BRPM | OXYGEN SATURATION: 97 %

## 2022-06-19 LAB
ABSOLUTE EOS #: 0.52 K/UL (ref 0–0.44)
ABSOLUTE IMMATURE GRANULOCYTE: <0.03 K/UL (ref 0–0.3)
ABSOLUTE LYMPH #: 1.23 K/UL (ref 1.1–3.7)
ABSOLUTE MONO #: 0.52 K/UL (ref 0.1–1.2)
ANION GAP SERPL CALCULATED.3IONS-SCNC: 11 MMOL/L (ref 9–17)
BASOPHILS # BLD: 1 % (ref 0–2)
BASOPHILS ABSOLUTE: 0.05 K/UL (ref 0–0.2)
BUN BLDV-MCNC: 14 MG/DL (ref 8–23)
CALCIUM SERPL-MCNC: 8.7 MG/DL (ref 8.6–10.4)
CHLORIDE BLD-SCNC: 100 MMOL/L (ref 98–107)
CO2: 27 MMOL/L (ref 20–31)
CREAT SERPL-MCNC: 2.11 MG/DL (ref 0.5–0.9)
EOSINOPHILS RELATIVE PERCENT: 10 % (ref 1–4)
GFR AFRICAN AMERICAN: 29 ML/MIN
GFR NON-AFRICAN AMERICAN: 24 ML/MIN
GFR SERPL CREATININE-BSD FRML MDRD: ABNORMAL ML/MIN/{1.73_M2}
GLUCOSE BLD-MCNC: 150 MG/DL (ref 70–99)
GLUCOSE BLD-MCNC: 183 MG/DL (ref 65–105)
GLUCOSE BLD-MCNC: 239 MG/DL (ref 65–105)
GLUCOSE BLD-MCNC: 240 MG/DL (ref 65–105)
HCT VFR BLD CALC: 29.8 % (ref 36.3–47.1)
HEMOGLOBIN: 9.6 G/DL (ref 11.9–15.1)
IMMATURE GRANULOCYTES: 0 %
LYMPHOCYTES # BLD: 25 % (ref 24–43)
MAGNESIUM: 2.4 MG/DL (ref 1.6–2.6)
MCH RBC QN AUTO: 30.7 PG (ref 25.2–33.5)
MCHC RBC AUTO-ENTMCNC: 32.2 G/DL (ref 28.4–34.8)
MCV RBC AUTO: 95.2 FL (ref 82.6–102.9)
MONOCYTES # BLD: 10 % (ref 3–12)
NRBC AUTOMATED: 0 PER 100 WBC
PDW BLD-RTO: 16.8 % (ref 11.8–14.4)
PLATELET # BLD: 141 K/UL (ref 138–453)
PMV BLD AUTO: 10.5 FL (ref 8.1–13.5)
POTASSIUM SERPL-SCNC: 3.5 MMOL/L (ref 3.7–5.3)
RBC # BLD: 3.13 M/UL (ref 3.95–5.11)
RBC # BLD: ABNORMAL 10*6/UL
SEG NEUTROPHILS: 53 % (ref 36–65)
SEGMENTED NEUTROPHILS ABSOLUTE COUNT: 2.68 K/UL (ref 1.5–8.1)
SODIUM BLD-SCNC: 138 MMOL/L (ref 135–144)
WBC # BLD: 5 K/UL (ref 3.5–11.3)

## 2022-06-19 PROCEDURE — 99253 IP/OBS CNSLTJ NEW/EST LOW 45: CPT | Performed by: INTERNAL MEDICINE

## 2022-06-19 PROCEDURE — 6370000000 HC RX 637 (ALT 250 FOR IP): Performed by: STUDENT IN AN ORGANIZED HEALTH CARE EDUCATION/TRAINING PROGRAM

## 2022-06-19 PROCEDURE — 83735 ASSAY OF MAGNESIUM: CPT

## 2022-06-19 PROCEDURE — 85025 COMPLETE CBC W/AUTO DIFF WBC: CPT

## 2022-06-19 PROCEDURE — 96366 THER/PROPH/DIAG IV INF ADDON: CPT

## 2022-06-19 PROCEDURE — 82947 ASSAY GLUCOSE BLOOD QUANT: CPT

## 2022-06-19 PROCEDURE — 36415 COLL VENOUS BLD VENIPUNCTURE: CPT

## 2022-06-19 PROCEDURE — 6360000002 HC RX W HCPCS: Performed by: STUDENT IN AN ORGANIZED HEALTH CARE EDUCATION/TRAINING PROGRAM

## 2022-06-19 PROCEDURE — 80048 BASIC METABOLIC PNL TOTAL CA: CPT

## 2022-06-19 PROCEDURE — 96372 THER/PROPH/DIAG INJ SC/IM: CPT

## 2022-06-19 PROCEDURE — 6370000000 HC RX 637 (ALT 250 FOR IP)

## 2022-06-19 PROCEDURE — G0378 HOSPITAL OBSERVATION PER HR: HCPCS

## 2022-06-19 PROCEDURE — 99223 1ST HOSP IP/OBS HIGH 75: CPT | Performed by: INTERNAL MEDICINE

## 2022-06-19 PROCEDURE — 2580000003 HC RX 258

## 2022-06-19 PROCEDURE — 96376 TX/PRO/DX INJ SAME DRUG ADON: CPT

## 2022-06-19 PROCEDURE — 99222 1ST HOSP IP/OBS MODERATE 55: CPT | Performed by: PSYCHIATRY & NEUROLOGY

## 2022-06-19 PROCEDURE — 6360000002 HC RX W HCPCS

## 2022-06-19 RX ORDER — INSULIN GLARGINE 100 [IU]/ML
25 INJECTION, SOLUTION SUBCUTANEOUS 2 TIMES DAILY
Status: DISCONTINUED | OUTPATIENT
Start: 2022-06-19 | End: 2022-06-19 | Stop reason: HOSPADM

## 2022-06-19 RX ORDER — INSULIN GLARGINE 100 [IU]/ML
15 INJECTION, SOLUTION SUBCUTANEOUS ONCE
Status: COMPLETED | OUTPATIENT
Start: 2022-06-19 | End: 2022-06-19

## 2022-06-19 RX ORDER — TIZANIDINE 4 MG/1
4 TABLET ORAL EVERY 6 HOURS PRN
Status: DISCONTINUED | OUTPATIENT
Start: 2022-06-19 | End: 2022-06-19 | Stop reason: HOSPADM

## 2022-06-19 RX ORDER — OXYCODONE HYDROCHLORIDE AND ACETAMINOPHEN 5; 325 MG/1; MG/1
1 TABLET ORAL ONCE
Status: COMPLETED | OUTPATIENT
Start: 2022-06-19 | End: 2022-06-19

## 2022-06-19 RX ORDER — INSULIN GLARGINE 100 [IU]/ML
25 INJECTION, SOLUTION SUBCUTANEOUS NIGHTLY
Status: DISCONTINUED | OUTPATIENT
Start: 2022-06-19 | End: 2022-06-19

## 2022-06-19 RX ORDER — POTASSIUM CHLORIDE 20 MEQ/1
20 TABLET, EXTENDED RELEASE ORAL ONCE
Status: COMPLETED | OUTPATIENT
Start: 2022-06-19 | End: 2022-06-19

## 2022-06-19 RX ORDER — GABAPENTIN 300 MG/1
300 CAPSULE ORAL 2 TIMES DAILY
Status: DISCONTINUED | OUTPATIENT
Start: 2022-06-19 | End: 2022-06-19 | Stop reason: HOSPADM

## 2022-06-19 RX ORDER — INSULIN GLARGINE 100 [IU]/ML
15 INJECTION, SOLUTION SUBCUTANEOUS NIGHTLY
Status: DISCONTINUED | OUTPATIENT
Start: 2022-06-19 | End: 2022-06-19

## 2022-06-19 RX ORDER — FENTANYL CITRATE 50 UG/ML
50 INJECTION, SOLUTION INTRAMUSCULAR; INTRAVENOUS ONCE
Status: COMPLETED | OUTPATIENT
Start: 2022-06-19 | End: 2022-06-19

## 2022-06-19 RX ORDER — MIDODRINE HYDROCHLORIDE 2.5 MG/1
2.5 TABLET ORAL
Status: DISCONTINUED | OUTPATIENT
Start: 2022-06-19 | End: 2022-06-19 | Stop reason: HOSPADM

## 2022-06-19 RX ADMIN — BUSPIRONE HYDROCHLORIDE 10 MG: 10 TABLET ORAL at 08:02

## 2022-06-19 RX ADMIN — POTASSIUM CHLORIDE 20 MEQ: 1500 TABLET, EXTENDED RELEASE ORAL at 09:10

## 2022-06-19 RX ADMIN — INSULIN LISPRO 4 UNITS: 100 INJECTION, SOLUTION INTRAVENOUS; SUBCUTANEOUS at 11:29

## 2022-06-19 RX ADMIN — HEPARIN SODIUM 5000 UNITS: 5000 INJECTION INTRAVENOUS; SUBCUTANEOUS at 06:41

## 2022-06-19 RX ADMIN — SODIUM BICARBONATE 650 MG: 648 TABLET ORAL at 16:00

## 2022-06-19 RX ADMIN — SODIUM CHLORIDE, PRESERVATIVE FREE 10 ML: 5 INJECTION INTRAVENOUS at 08:02

## 2022-06-19 RX ADMIN — ASPIRIN 81 MG: 81 TABLET, CHEWABLE ORAL at 08:02

## 2022-06-19 RX ADMIN — HEPARIN SODIUM 5000 UNITS: 5000 INJECTION INTRAVENOUS; SUBCUTANEOUS at 16:00

## 2022-06-19 RX ADMIN — INSULIN GLARGINE 25 UNITS: 100 INJECTION, SOLUTION SUBCUTANEOUS at 08:00

## 2022-06-19 RX ADMIN — MIDODRINE HYDROCHLORIDE 2.5 MG: 2.5 TABLET ORAL at 11:29

## 2022-06-19 RX ADMIN — OXYCODONE HYDROCHLORIDE AND ACETAMINOPHEN 1 TABLET: 5; 325 TABLET ORAL at 11:27

## 2022-06-19 RX ADMIN — INSULIN LISPRO 2 UNITS: 100 INJECTION, SOLUTION INTRAVENOUS; SUBCUTANEOUS at 08:00

## 2022-06-19 RX ADMIN — TIZANIDINE 4 MG: 4 TABLET ORAL at 12:22

## 2022-06-19 RX ADMIN — RANOLAZINE 1000 MG: 500 TABLET, FILM COATED, EXTENDED RELEASE ORAL at 08:02

## 2022-06-19 RX ADMIN — ATORVASTATIN CALCIUM 80 MG: 80 TABLET, FILM COATED ORAL at 08:02

## 2022-06-19 RX ADMIN — GABAPENTIN 300 MG: 300 CAPSULE ORAL at 09:11

## 2022-06-19 RX ADMIN — FENTANYL CITRATE 50 MCG: 50 INJECTION, SOLUTION INTRAMUSCULAR; INTRAVENOUS at 09:05

## 2022-06-19 RX ADMIN — INSULIN GLARGINE 15 UNITS: 100 INJECTION, SOLUTION SUBCUTANEOUS at 00:54

## 2022-06-19 RX ADMIN — SODIUM BICARBONATE 650 MG: 648 TABLET ORAL at 08:02

## 2022-06-19 ASSESSMENT — ENCOUNTER SYMPTOMS
EYE DISCHARGE: 0
EYES NEGATIVE: 1
NAUSEA: 1
VOMITING: 0
CONSTIPATION: 0
EYE ITCHING: 0
FACIAL SWELLING: 0
EYE PAIN: 0
CHOKING: 0
VOICE CHANGE: 0
RHINORRHEA: 0
PHOTOPHOBIA: 0
SHORTNESS OF BREATH: 0
STRIDOR: 0
RESPIRATORY NEGATIVE: 1
COUGH: 0
ABDOMINAL PAIN: 0
COLOR CHANGE: 0
CHEST TIGHTNESS: 0
EYE REDNESS: 0
APNEA: 0
ALLERGIC/IMMUNOLOGIC NEGATIVE: 1
DIARRHEA: 0
WHEEZING: 0
BACK PAIN: 1
SINUS PAIN: 0
ABDOMINAL DISTENTION: 0

## 2022-06-19 ASSESSMENT — PAIN DESCRIPTION - ORIENTATION
ORIENTATION: MID;UPPER
ORIENTATION: MID
ORIENTATION: MID

## 2022-06-19 ASSESSMENT — PAIN DESCRIPTION - DESCRIPTORS
DESCRIPTORS: ACHING;CRAMPING
DESCRIPTORS: ACHING;CRUSHING
DESCRIPTORS: ACHING;CRAMPING

## 2022-06-19 ASSESSMENT — PAIN SCALES - GENERAL
PAINLEVEL_OUTOF10: 8
PAINLEVEL_OUTOF10: 4
PAINLEVEL_OUTOF10: 7

## 2022-06-19 ASSESSMENT — PAIN DESCRIPTION - LOCATION
LOCATION: BACK

## 2022-06-19 NOTE — CONSULTS
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 Netta Harper    Chief Complaint:  AMS  History Obtained From:  KIERRA RN, patient    History of Present Illness: This is a 57 y.o. female with end stage renal disease on hemodialysis MWF under the care of Dr. Airam Benedict. Acute kidney injury on CKD 4 most likely secondary to diabetic nephropathy and ATN , requiring hemodialysis Hemodialysis started on Aug 2021. Was brought in with the complaint of altered mental status and lightheadedness. She was undergoing dialysis today as per the schedule, her systolic blood pressure dropped dropped down to 80s, she had lightheadedness. Her blood pressure improved slightly with midodrine but she became significantly confused and disoriented, and was therefore brought in [facility] for further work-up and treatment.      In the ER,  She was awake and alert, following commands but disoriented to self, time or place and could not recall previous events or past medical history. She appeared extremely anxious and stressed about her memory deficits.     As per the sister, the patient had similar 3 -4 episodes in the past months during dialysis, she became hypotensive and was unable to recall her name or anything else. Patient gradually came back to normal over few hours to few days. Subsequent episodes were relatively mild and patient recovered faster. No reported episodes of dialysis disequilibrium since November 2021.     She was hemodynamically stable but appeared confused, disoriented and tearful. EKG showed poor R wave progression, prolonged , isolated inversion in V3 and flattening of V4. Nonspecific EKG changes.      Neurology was consulted in the ER for the evaluation of altered mental status. This am, she is doing well mentally, her main complaint is back spasms.  She is tolerating her diet, no abd pain, no n/v/d, no fevers, stable vitals.        Past Medical History:        Diagnosis Date    Arthritis     Backache, unspecified     Cerebral artery occlusion with cerebral infarction [facility] [address]     CHF (congestive heart failure) (HCC)     Chronic kidney disease     Coronary atherosclerosis of artery bypass graft     COVID 1/31/2022    Cramp of limb     Gallstones     Hemodialysis patient ([facility] [address]     Hyperlipidemia     Hypertension     Insomnia     Neuromuscular disorder ([facility] [address]     Pneumonia     Psychiatric problem     Thyroid disease     Type II or unspecified type diabetes mellitus with renal manifestations, not stated as uncontrolled(250.40)     Type II or unspecified type diabetes mellitus without mention of complication, not stated as uncontrolled     Unspecified vitamin D deficiency        Access:  tunneled catheter    Past Surgical History:        Procedure Laterality Date    ANKLE FRACTURE SURGERY      AV FISTULA CREATION  12/14/2021    BACK SURGERY      BREAST SURGERY      CARDIAC SURGERY      CARPAL TUNNEL RELEASE      CHOLECYSTECTOMY, OPEN N/A     COLONOSCOPY      CORONARY ARTERY BYPASS GRAFT      x3    DIALYSIS FISTULA CREATION Left 12/14/2021    LEFT AV FISTULA CREATION UPPER EXTREMITY performed by Elyse Krishnan MD at [address] Replaced by [facility]      HAND SURGERY      IR TUNNELED CATHETER PLACEMENT GREATER THAN 5 YEARS  8/18/2021    IR TUNNELED CATHETER PLACEMENT GREATER THAN 5 YEARS 8/18/2021 [facility] SPECIAL PROCEDURES    KNEE ARTHROSCOPY      right    OTHER SURGICAL HISTORY  04/06/2022    cvc exchange    TONSILLECTOMY         Current Medications:    insulin glargine (LANTUS) injection vial 25 Units, BID  midodrine (PROAMATINE) tablet 2.5 mg, TID WC  tiZANidine (ZANAFLEX) tablet 4 mg, Q6H PRN  gabapentin (NEURONTIN) capsule 300 mg, BID  oxyCODONE-acetaminophen (PERCOCET) 5-325 MG per tablet 1 tablet, Once  sodium chloride flush 0.9 % injection 5-40 mL, 2 times per day  sodium chloride flush 0.9 % injection 5-40 mL, PRN  0.9 % sodium chloride infusion, PRN  ondansetron (ZOFRAN-ODT) disintegrating tablet 4 mg, Q8H PRN   Or  ondansetron (ZOFRAN) injection 4 mg, Q6H PRN  polyethylene glycol (GLYCOLAX) packet 17 g, Daily PRN  acetaminophen (TYLENOL) tablet 650 mg, Q6H PRN   Or  acetaminophen (TYLENOL) suppository 650 mg, Q6H PRN  heparin (porcine) injection 5,000 Units, 3 times per day  aspirin chewable tablet 81 mg, Daily  atorvastatin (LIPITOR) tablet 80 mg, Daily  traZODone (DESYREL) tablet 75 mg, Nightly  [Held by provider] carvedilol (COREG) tablet 3.125 mg, BID  insulin lispro (HUMALOG) injection vial 0-12 Units, TID WC  insulin lispro (HUMALOG) injection vial 0-6 Units, Nightly  glucose chewable tablet 16 g, PRN  dextrose bolus 10% 125 mL, PRN   Or  dextrose bolus 10% 250 mL, PRN  glucagon (rDNA) injection 1 mg, PRN  dextrose 5 % solution, PRN  sodium bicarbonate tablet 650 mg, TID  ranolazine (RANEXA) extended release tablet 1,000 mg, BID  busPIRone (BUSPAR) tablet 10 mg, TID  lidocaine 4 % external patch 1 patch, Daily        Allergies:  Adhesive tape, Ace inhibitors, Iv dye [iodides], Nsaids, and Metformin and related    Social History:    Social History     Socioeconomic History    Marital status: Single     Spouse name: Not on file    Number of children: Not on file    Years of education: Not on file    Highest education level: Not on file   Occupational History    Not on file   Tobacco Use    Smoking status: Never Smoker    Smokeless tobacco: Never Used   Vaping Use    Vaping Use: Never used   Substance and Sexual Activity    Alcohol use: No    Drug use: No    Sexual activity: Not Currently   Other Topics Concern    Not on file   Social History Narrative    Not on file     Social Determinants of Health     Financial Resource Strain: Low Risk     Difficulty of Paying Living Expenses: Not very hard   Food Insecurity: No Food Insecurity    Worried About [facility] in the Last Year: Never true    Nany of Food in the Last Year: Never true   Transportation Needs: No Transportation Needs    Lack of Transportation (Medical): No    Lack of Transportation (Non-Medical): No   Physical Activity: Inactive    Days of Exercise per Week: 0 days    Minutes of Exercise per Session: 0 min   Stress: Stress Concern Present    Feeling of Stress :  To some extent   Social Connections: Socially Isolated    Frequency of Communication with Friends and Family: More than three times a week    Frequency of Social Gatherings with Friends and Family: More than three times a week    Attends Jew Services: Never    Active Member of Clubs or Organizations: No    Attends Club or Organization Meetings: Never    Marital Status: Never    Intimate Partner Violence:     Fear of Current or Ex-Partner: Not on file    Emotionally Abused: Not on file    Physically Abused: Not on file    Sexually Abused: Not on file   Housing Stability: [address] Unable to Pay for Housing in the Last Year: No    Number of [place] in the Last Year: 1    Unstable Housing in the Last Year: No       Family History:   Family History   Problem Relation Age of Onset    Diabetes Father     Heart Failure Father        Review of Systems:    10/14 systems reviewed, pertinent + per HPI    Objective:  Constitutional:    CURRENT TEMPERATURE:  Temp: 97.7 °F (36.5 °C)  MAXIMUM TEMPERATURE OVER 24HRS:  Temp (24hrs), Av.1 °F (36.7 °C), Min:97.7 °F (36.5 °C), Max:98.3 °F (36.8 °C)    CURRENT RESPIRATORY RATE:  Resp: 22  CURRENT PULSE:  Heart Rate: 74  CURRENT BLOOD PRESSURE:  BP: (!) 144/64  24HR BLOOD PRESSURE RANGE:  Systolic (37NPH), GGB:580 , Min:97 , XGF:924   ; Diastolic (75LIO), ZBS:42, Min:32, Max:79    24HR INTAKE/OUTPUT:  No intake or output data in the 24 hours ending 22 1038      Physical Exam:  General appearance:Awake, alert, in no acute distress  Skin: warm and dry, no rash or erythema  Eyes: conjunctivae normal and sclera anicteric   Pulmonary: clear bilaterally  Cardiovascular:Normal S1 & S2, No S3 or  S4, No  Pericardial rub , No Murmur   Abdomen: soft nontender, bowel sounds present, no organomegaly,  No ascites    Extremities: no cyanosis, edema present     Labs:  BMP:   Recent Labs     06/18/22  1619 06/19/22  0621    138   K 2.6* 3.5*   CL 96* 100   CO2 26 27   BUN 10 14   CREATININE 1.42* 2.11*   GLUCOSE 175* 150*   CALCIUM 8.9 8.7     CBC:  LABRCNT(WBC:3,RBC:3,HGB:3,HCT:3,MCV:3,MCH:3,MCHC:3,RDW:3,PLT:3,MPV:3)@   Renal:  Phosphorus:  No results for input(s): PHOS in the last 72 hours. Magnesium:   Recent Labs     06/18/22  1619 06/18/22  1727 06/19/22  0621   MG 1.9 1.8 2.4     Albumin:   Recent Labs     06/18/22  1619   LABALBU 3.6     PTH:  No results for input(s): PTH in the last 72 hours. Cultures:  Blood cultures: No results for input(s): BC in the last 72 hours. Assessment: This is a 59 y.o. female with past medical history of longstanding type 2 diabetes insulin-dependent diabetes mellitus, essential hypertension,CAD s/p CABG 2004, STENT 2019, CHF EF 55% , Mild- mod LVH, diastolic dysfucntion, CKD 4 with baseline creatinine averaging from 1.9 to 2.2 mg/dL, patient was following up with Dr. Valeri Tavarez, but she has to change her nephrologist due to insurance. Patient was told that she is stage IV for last for 3-4 years. She started HD in August of 2021. Admission weight not done, patient not aware of her exact dry weight. 1. ESRD MWF under the care of Dr. Keisha Comer. 2. HTN  3. DM  4. CAD s/p CABG 2004 stents 2019  5. CHF EF 55%. Mod LV diastolic dysfunction  6. AMS s/p HD yesterday, no evidence to suggest this is related to dialysis dysequilibrium syndrome. Her incident most likely secondary to hypotension on dialysis which then caused her to have brief syncope. He is back to her normal baseline now. Plan:  1.  Recommend to resume her regular HD schedule, under the care of Dr. Chaitanya Ackerman. 2. Continue home meds. 3. No objection to discharge from a Nephrology standpoint. Will discuss with Dr. Paco Barr. Thank you for the consultation. Please do not hesitate to call with questions. Electronically signed by OBEY Cuevas on 6/19/2022 at 10:38 AM  Attending Physician Statement  I have discussed the care of Mirella Garnica, including pertinent history and exam findings with the resident/fellow. I have reviewed the key elements of all parts of the encounter with the resident/fellow. I have seen and examined the patient with the resident/fellow. I agree with the assessment and plan and status of the problem list as documented.     Henrietta Palomo MD , MD

## 2022-06-19 NOTE — PROGRESS NOTES
[facility]  Internal Medicine Teaching Residency Program  Inpatient Daily Progress Note  ______________________________________________________________________________    Patient: Qasim Mathews  YOB: 1960   [id number]    Acct: [de-identified]     Room: 2023/2023-01  Admit date: 6/18/2022  Today's date: 06/19/22  Number of days in the hospital: 1    SUBJECTIVE   Admitting Diagnosis: Hypokalemia  CC: altered mental status    Pt seen and examined bedside. No acute events overnight. Labs and charts reviewed. Afebrile, hemodynamically stable, saturating well on 3 L via NC. Her BP is improving, started on midodrine 2.5 mg TID. Denies any chest pain, breathing difficulty, palpitations or lightheadedness. Her mental status is returned to the baseline. Alert, oriented x 4. She has back pain and painful muscle spasms. ROS:  Constitutional:  negative for chills, fevers, sweats  Respiratory:  negative for cough, dyspnea on exertion, hemoptysis, shortness of breath, wheezing  Cardiovascular:  negative for chest pain, chest pressure/discomfort, lower extremity edema, palpitations  Gastrointestinal:  negative for abdominal pain, constipation, diarrhea, nausea, vomiting  Neurological:  negative for dizziness, headache  BRIEF HISTORY     The patient is a pleasant 57 y.o. female with PMH significant of  -Chronic respiratory failure (on 3 L O2 via NC),  -Diastolic CHF  -ESRD on TTS  -Dialysis disequilibrium syndrome  -History of DVT (not on anticoagulation)  -CAD s/p CABG  -CKD,  -Type II DM with episodes of DKA in past  -Morbid obesity  -HTN, HLP  -Recent admission to rehab after being admitted for DKA and UTI.     -Was brought in with the complaint of altered mental status and lightheadedness. She was undergoing dialysis today as per the schedule, her systolic blood pressure dropped dropped down to 80s, she had lightheadedness.   Her blood pressure retraction or use of accessory muscles. No egophony noted. Cardiovascular: Regular without murmur, clicks, gallops or rubs. Abdomen: Slightly rounded and soft without organomegaly. No rebound, rigidity or guarding was appreciated. Lymphatic: No lymphadenopathy. Musculoskeletal: Normal curvature of the spine. No gross muscle weakness. Extremities:  No lower extremity edema, ulcerations, tenderness, varicosities or erythema. Muscle size, tone and strength are normal.  No involuntary movements are noted. Skin:  Warm and dry. Good color, turgor and pigmentation. No lesions or scars.   No cyanosis or clubbing  Neurological/Psychiatric: The patient's general behavior, level of consciousness, thought content and emotional status is normal.        Medications:  Scheduled Medications:    insulin glargine  25 Units SubCUTAneous BID    midodrine  2.5 mg Oral TID WC    gabapentin  300 mg Oral BID    oxyCODONE-acetaminophen  1 tablet Oral Once    sodium chloride flush  5-40 mL IntraVENous 2 times per day    heparin (porcine)  5,000 Units SubCUTAneous 3 times per day    aspirin  81 mg Oral Daily    atorvastatin  80 mg Oral Daily    traZODone  75 mg Oral Nightly    [Held by provider] carvedilol  3.125 mg Oral BID    insulin lispro  0-12 Units SubCUTAneous TID WC    insulin lispro  0-6 Units SubCUTAneous Nightly    sodium bicarbonate  650 mg Oral TID    ranolazine  1,000 mg Oral BID    busPIRone  10 mg Oral TID    lidocaine  1 patch TransDERmal Daily     Continuous Infusions:    sodium chloride      dextrose       PRN MedicationstiZANidine, 4 mg, Q6H PRN  sodium chloride flush, 5-40 mL, PRN  sodium chloride, , PRN  ondansetron, 4 mg, Q8H PRN   Or  ondansetron, 4 mg, Q6H PRN  polyethylene glycol, 17 g, Daily PRN  acetaminophen, 650 mg, Q6H PRN   Or  acetaminophen, 650 mg, Q6H PRN  glucose, 4 tablet, PRN  dextrose bolus, 125 mL, PRN   Or  dextrose bolus, 250 mL, PRN  glucagon (rDNA), 1 mg, PRN  dextrose, 100 mL/hr, PRN        Diagnostic Labs:  CBC:   Recent Labs     06/18/22  1619 06/19/22  0621   WBC 3.3* 5.0   RBC 5.03 3.13*   HGB 15.3* 9.6*   HCT 47.8* 29.8*   MCV 95.0 95.2   RDW 17.0* 16.8*   * 141     BMP:   Recent Labs     06/18/22  1619 06/19/22  0621    138   K 2.6* 3.5*   CL 96* 100   CO2 26 27   BUN 10 14   CREATININE 1.42* 2.11*     BNP: No results for input(s): BNP in the last 72 hours. PT/INR: No results for input(s): PROTIME, INR in the last 72 hours. APTT: No results for input(s): APTT in the last 72 hours. CARDIAC ENZYMES: No results for input(s): CKMB, CKMBINDEX, TROPONINI in the last 72 hours. Invalid input(s): CKTOTAL;3  FASTING LIPID PANEL:  Lab Results   Component Value Date    CHOL 105 04/09/2015    HDL 43 04/09/2015    TRIG 168 04/09/2015     LIVER PROFILE:   Recent Labs     06/18/22  1619   AST 22   ALT 14   BILITOT 0.82   ALKPHOS 91      MICROBIOLOGY:   Lab Results   Component Value Date/Time    CULTURE KLEBSIELLA PNEUMONIAE >450765 CFU/ML (A) 06/06/2022 05:22 PM    CULTURE  06/06/2022 05:22 PM     PRESUMPTIVE ID: GROUP D ENTEROCOCCUS 10 to 50,000 CFU/ML Susceptibility testing not performed on low colony count organisms. Imaging:    XR TIBIA FIBULA LEFT (2 VIEWS)    Result Date: 6/18/2022  No acute osseous abnormality. Mild widening of the medial clear space of the ankle may reflect ligamentous injury. XR ANKLE LEFT (MIN 3 VIEWS)    Result Date: 6/18/2022  No acute osseous abnormality. Mild widening of the medial clear space of the ankle may reflect ligamentous injury. CT HEAD WO CONTRAST    Result Date: 6/18/2022  Mild central and cortical cerebral atrophy. Mild chronic deep white matter ischemic changes No acute intracranial abnormalities are noted.      XR CHEST PORTABLE    Result Date: 6/18/2022  Cardiomegaly with associated mild pulmonary vascular congestion       ASSESSMENT & PLAN        Principal Problem:    Hypokalemia  Active Problems:    Dialysis disequilibrium syndrome    Hyperglycemia    Chronic diastolic heart failure (HCC)    Diabetic polyneuropathy associated with type 2 diabetes mellitus ([facility] [address]    History of coronary artery bypass graft    Mixed hyperlipidemia    Type 2 diabetes 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, with long-term current use of insulin (HCC)    Essential hypertension    Generalized weakness    Anxiety    AMS (altered mental status)    End-stage renal disease ([facility] [address]  Resolved Problems:    * No resolved hospital problems. *        IMPRESSION  This is a 57 y.o. female who presented with altered mental status and lightheadedness during dialysis and found to have acute metabolic encephalopathy likely secondary to dialysis disequilibrium syndrome along with hypokalemia and hypomagnesemia. Patient admitted to inpatient status to evaluate and manage the same.     Acute metabolic encephalopathy:  -Improved, back to baseline.   -Likely, was secondary to dialysis disequilibrium syndrome vs less likely seizure or stroke vs hypotension vs electrolyte abnormalities.    -Neurology on board, appreciate recommendations. -CT head Wo contrast showed mild central and cortical cerebral atrophy. Mild chronic deep white matter ischemic changes. No acute intracranial abnormalities noted. -Monitor for gradual recovery.     -If no improvement, plan for LTME and MRI brain  -Replace electrolytes magnesium, potassium.     Dialysis disequilibrium syndrome:  -Patient's mentation is improving on repeat evaluation  -Nephrology consulted for the diagnosis and prevention of further dialysis disequilibrium syndrome episodes,  -Patient is encouraged to be compliant with hemodialysis.     ESRD  -Started on TTS since August 2021  -Likely secondary to diabetic nephropathy     Essential hypertension  -Chronic diastolic heart failure   -On furosemide 80 mg twice daily at home.  -Will resume as appropriate.     Hypokalemia  -Resolving, Potassium 3.5 today. 20 mEq replacement ordered     Hypomagnesemia  -resolved     Diabetes mellitus  -On 25 units Lantus twice daily at home.  -On medium dose sliding scale  -Hypoglycemia protocol in place      Diabetic polyneuropathy  -Continue gabapentin 300 mg 3 times daily     Coronary artery disease   -s/p CABG  -Continue aspirin 81 mg, Lipitor 80 mg, Coreg 3.125, and Ranexa 1000 mg twice daily     Morbid obesity  ERICK  -BiPAP during nighttime sleep and daytime naps. -Outpatient sleep study  -Pt doesn't want to use BiPAP machine.      Depression/bipolar disorder  -On buspirone 10 mg 3 times daily  -On trazodone 75 mg as needed nightly     DVT ppx  -On heparin 5000 units 3 times daily     GI ppx  -On omeprazole at home. Will resume as appropriate     PT/OT/SW  -Consulted     Discharge Planning  - to assist in discharge planning, likely discharge back home. Roberto Santizo MD  Internal Medicine Resident, PGY-1  [address], [place]  6/19/2022, 6:55 AM

## 2022-06-19 NOTE — PROGRESS NOTES
Pt came in with some memory deficits after having altered mentation at dialysis on the 18th. Complaints of pain/back spasms: chronic condition Provided pressure and rubbing/massage over the painful area X4. Provided heating pad for painful area X3. Placed new IV after Pt pulled hers out tossing and turning. Monitored BP closely- Pt was high in the beginning, and then dropped. Pt uses walker at home.

## 2022-06-19 NOTE — PROGRESS NOTES
[facility] Neurology   [address] [place] [address]    Resident Progress Note             Date:   6/19/2022  Patient name:  Maria Elena Basurto  Date of admission:  6/18/2022  3:53 PM  MRN:   [id number]  Account:  [de-identified]  YOB: 1960  PCP:    AFSANEH Hernandez CNP  Room:   2023/2023-01  Code Status:    Full Code      Interval History:     Admitted 6/18/2022. Today is day 1 hospitalization   The patient was seen and examined today and the chart was reviewed. Vital signs show hypotensive BP readings with SBP in 90s. Otherwise the patient is doing well. She reported back pain and asked for pain medx. She seem she is back to her baseline. No concerns for strokes. Seems what happened was dialysis disequilibrium syndrome     Brief History:      57 y.o. female hx of CHF, ESRD on dialysis, CAD, HTN, T2DM, dialysis disequilibrium syndrome, questionable right frontal infarct in August 2021 (at [address]), who presents with altered mental status and syncopal event. Patient was undergoing dialysis earlier today when her SBP dropped down to the 80s and she became syncopal.  Patient has midodrine ordered for blood pressure drops during dialysis, received midodrine and her pressure jumped up slightly but did not normalise. Subsequently patient became significantly confused and disoriented and was brought into [facility] for further work-up and treatment   Since arrival patient has been unable to tell her name, where she is or answer any other questions regarding herself or her surroundings. She does not recall any further medical history. She is very tearful, breaks down into tears when asked any questions and appears extremely anxious and stressed about her memory loss. Patient repeatedly asks if and when she will get her memory back. Patient's sister was contacted for further information.   According to the history patient had stroke back in August 2021 for which she was admitted to and treated at [facility], subsequently multiple neurologists have seen the patient and have refuted that the initial event was a stroke. Patient had a very similar episode back in September 2021 when she was undergoing dialysis, became hypotensive and was unable to recall her name or anything else. Per the sister patient was unable to even recognize a cell phone. Patient gradually returned back to normal over a few days but then had a repeat episode in October and then another one in November, these 2 subsequent episodes were relatively mild and patient recovered faster. Patient has not had any further episodes of dialysis disequilibrium since November 2021. Though she has had multiple hospital admissions since then for DKA, UTIs and other issues. Patient has had prolonged hospital stays and was just recently discharged on 2 June from rehab which she required because of a 3-1/2-week hospital stay. Patient subsequently got admitted to the hospital again around 6 June for DKA and UTI, was discharged last Monday. Subjective:     Low back pain    ROS  Constitutional: no fever, chills, fatigue  HENT: No change in vision or hearing   Respiratory: No cough, SOB, wheezing. Cardiovascular:  No chest pain, palpitations, leg swelling. Gastrointestinal: No nausea, vomiting, diarrhea. Genitourinary: No increased frequency, urgency. Musculoskeletal: No myalgia or arthralgia. Positive low back pain/  Skin: No rashes or scarring or bruises. Neurological: No headache, paresthesia, or focal weakness. Endo/Heme/Allergies: Negative for itchy eyes or runny nose. Psychiatric/Behavioral: No anxiety or depressed mood.         insulin glargine  25 Units SubCUTAneous BID    midodrine  2.5 mg Oral TID     gabapentin  300 mg Oral BID    oxyCODONE-acetaminophen  1 tablet Oral Once    sodium chloride flush  5-40 mL IntraVENous 2 times per day    heparin (porcine)  5,000 Units SubCUTAneous 3 times per day    aspirin  81 mg Oral Daily    atorvastatin  80 mg Oral Daily    traZODone  75 mg Oral Nightly    [Held by provider] carvedilol  3.125 mg Oral BID    insulin lispro  0-12 Units SubCUTAneous TID WC    insulin lispro  0-6 Units SubCUTAneous Nightly    sodium bicarbonate  650 mg Oral TID    ranolazine  1,000 mg Oral BID    busPIRone  10 mg Oral TID    lidocaine  1 patch TransDERmal Daily       Past Medical History:   Diagnosis Date    Arthritis     Backache, unspecified     [facility]erebral artery occlusion [facility] infarction [facility]     CHF (congestive heart failure) (HCC)     Chronic kidney disease     Coronary atherosclerosis of artery bypass graft     COVID 1/31/2022    Cramp of limb     Gallstones     Hemodialysis patient ([facility]     Hyperlipidemia     Hypertension     Insomnia     Neuromuscular disorder ([facility]     Pneumonia     Psychiatric problem     Thyroid disease     Type II or unspecified type diabetes mellitus with renal manifestations, not stated as uncontrolled(250.40)     Type II or unspecified type diabetes mellitus without mention of complication, not stated as uncontrolled     Unspecified vitamin D deficiency        Past Surgical History:   Procedure Laterality Date    ANKLE FRACTURE SURGERY      AV FISTULA CREATION  12/14/2021    BACK SURGERY      BREAST SURGERY      CARDIAC SURGERY      CARPAL TUNNEL RELEASE      CHOLECYSTECTOMY, OPEN N/A     COLONOSCOPY      CORONARY ARTERY BYPASS GRAFT      x3    DIALYSIS FISTULA CREATION Left 12/14/2021    LEFT AV FISTULA CREATION UPPER EXTREMITY performed by Heriberto Cervantes MD at [facility]      HAND SURGERY      IR TUNNELED CATHETER PLACEMENT GREATER THAN 5 YEARS  8/18/2021    IR TUNNELED CATHETER PLACEMENT GREATER THAN 5 YEARS 8/18/2021 [facility]    KNEE ARTHROSCOPY      right    OTHER SURGICAL HISTORY  04/06/2022    cvc exchange    TONSILLECTOMY         Objective:     PHYSICAL EXAM:      Blood pressure (!) 144/64, pulse 74, temperature 97.7 °F (36.5 °C), temperature source Oral, resp. rate 22, SpO2 100 %. General Examination    General Resting comfortably in bed   Head Normocephalic, without obvious abnormality   Neck Supple, symmetrical. Good ROM. No midline or paraspinal tenderness. Lungs Respirations unlabored, no wheezing   Chest Wall No deformity   Heart RRR, no murmur   Abdomen Soft. Non-tender, non-distended   Extremities No cyanosis or edema or warmth. Pulses 2+ and symmetric   Skin: Skin  turgor normal, no rashes or lesions         Mental status  Speech Alert. Oriented to person, place, and time. Speech is fluent without paraphasic errors  Good repetition and naming  Can do 1 step, 2 step, and cross-body commands  Can spell world backwards. Language appropriate. No hallucinations or delusions. No SI/HI. Cranial nerves   II - VFF, visual threat intact  III, IV, VI - extra-ocular muscles full. No nystagmus. Pupils symmetric and responsive.    V - sensation symmetric         VII -  No facial droop or asymmetric NLF  VIII - intact hearing to conversational tone          IX, X - symmetrical palate elevation   XI - 5/5 strength symmetric  XII - tongue midline   Motor function  Strength: grossly 5/5 in b/l              Deltoid, biceps, triceps, wrist flexion, wrist extension             Hip flexion/extension, knee flexion/extension, plantar flexion  Bulk: grossly normal no atrophy  Tone: symmetric b/l arms and legs  Abnormal movements: No abnormal movements or tremor   Sensory function Symmetric to touch in all extremities bilaterally   Cerebellar No dysmetria or dysdiadochocinesia    Reflex function DTR:        2+ b/l symmetric in biceps, brachioradialis, patellar, calcaneal  Babinski b/l plantar downgoing   Gait                  Not assessed       Investigations:      Laboratory Testing:  Recent Results (from the past 24 hour(s))   EKG 12 Lead    Collection Time: 06/18/22  4:05 PM   Result Value Ref Range    Ventricular Rate 68 BPM    Atrial Rate 68 BPM    P-R Interval 154 ms    QRS Duration 100 ms    Q-T Interval 502 ms    QTc Calculation (Bazett) 533 ms    P Axis 64 degrees    R Axis 9 degrees    T Axis 106 degrees   Troponin    Collection Time: 06/18/22  4:18 PM   Result Value Ref Range    Troponin, High Sensitivity 81 (HH) 0 - 14 ng/L   CBC with Auto Differential    Collection Time: 06/18/22  4:19 PM   Result Value Ref Range    WBC 3.3 (L) 3.5 - 11.3 k/uL    RBC 5.03 3.95 - 5.11 m/uL    Hemoglobin 15.3 (H) 11.9 - 15.1 g/dL    Hematocrit 47.8 (H) 36.3 - 47.1 %    MCV 95.0 82.6 - 102.9 fL    MCH 30.4 25.2 - 33.5 pg    MCHC 32.0 28.4 - 34.8 g/dL    RDW 17.0 (H) 11.8 - 14.4 %    Platelets 781 (L) 859 - 453 k/uL    MPV 10.7 8.1 - 13.5 fL    NRBC Automated 0.0 0.0 per 100 WBC    Immature Granulocytes 0 0 %    Seg Neutrophils 70 (H) 36 - 66 %    Lymphocytes 16 (L) 24 - 44 %    Monocytes 10 (H) 1 - 7 %    Eosinophils % 4 1 - 4 %    Basophils 0 0 - 2 %    Absolute Immature Granulocyte 0.00 0.00 - 0.30 k/uL    Segs Absolute 2.31 1.8 - 7.7 k/uL    Absolute Lymph # 0.53 (L) 1.0 - 4.8 k/uL    Absolute Mono # 0.33 0.1 - 0.8 k/uL    Absolute Eos # 0.13 0.0 - 0.4 k/uL    Basophils Absolute 0.00 0.0 - 0.2 k/uL    Morphology ANISOCYTOSIS PRESENT    Comprehensive Metabolic Panel    Collection Time: 06/18/22  4:19 PM   Result Value Ref Range    Glucose 175 (H) 70 - 99 mg/dL    BUN 10 8 - 23 mg/dL    CREATININE 1.42 (H) 0.50 - 0.90 mg/dL    Calcium 8.9 8.6 - 10.4 mg/dL    Sodium 136 135 - 144 mmol/L    Potassium 2.6 (LL) 3.7 - 5.3 mmol/L    Chloride 96 (L) 98 - 107 mmol/L    CO2 26 20 - 31 mmol/L    Anion Gap 14 9 - 17 mmol/L    Alkaline Phosphatase 91 35 - 104 U/L    ALT 14 5 - 33 U/L    AST 22 <32 U/L    Total Bilirubin 0.82 0.3 - 1.2 mg/dL    Total Protein 6.7 6.4 - 8.3 g/dL    Albumin 3.6 3.5 - 5.2 g/dL    Albumin/Globulin Ratio 1.2 1.0 - 2.5 GFR Non-African American 37 (L) >60 mL/min    GFR  45 (L) >60 mL/min    GFR Comment         Ammonia    Collection Time: 06/18/22  4:19 PM   Result Value Ref Range    Ammonia 19 11 - 51 umol/L   TSH with Reflex    Collection Time: 06/18/22  4:19 PM   Result Value Ref Range    TSH 0.49 0.30 - 5.00 uIU/mL   Brain Natriuretic Peptide    Collection Time: 06/18/22  4:19 PM   Result Value Ref Range    Pro- (H) <300 pg/mL   Beta-Hydroxybutyrate    Collection Time: 06/18/22  4:19 PM   Result Value Ref Range    Beta-Hydroxybutyrate 0.82 (H) 0.02 - 0.27 mmol/L   Magnesium    Collection Time: 06/18/22  4:19 PM   Result Value Ref Range    Magnesium 1.9 1.6 - 2.6 mg/dL   Troponin    Collection Time: 06/18/22  5:27 PM   Result Value Ref Range    Troponin, High Sensitivity 79 (HH) 0 - 14 ng/L   Magnesium    Collection Time: 06/18/22  5:27 PM   Result Value Ref Range    Magnesium 1.8 1.6 - 2.6 mg/dL   COVID-19, Rapid    Collection Time: 06/18/22  5:28 PM    Specimen: Nasopharyngeal Swab   Result Value Ref Range    Specimen Description . NASOPHARYNGEAL SWAB     SARS-CoV-2, Rapid Not Detected Not Detected   POC Glucose Fingerstick    Collection Time: 06/18/22 10:31 PM   Result Value Ref Range    POC Glucose 274 (H) 65 - 105 mg/dL   POC Glucose Fingerstick    Collection Time: 06/19/22 12:35 AM   Result Value Ref Range    POC Glucose 240 (H) 65 - 105 mg/dL   CBC with Auto Differential    Collection Time: 06/19/22  6:21 AM   Result Value Ref Range    WBC 5.0 3.5 - 11.3 k/uL    RBC 3.13 (L) 3.95 - 5.11 m/uL    Hemoglobin 9.6 (L) 11.9 - 15.1 g/dL    Hematocrit 29.8 (L) 36.3 - 47.1 %    MCV 95.2 82.6 - 102.9 fL    MCH 30.7 25.2 - 33.5 pg    MCHC 32.2 28.4 - 34.8 g/dL    RDW 16.8 (H) 11.8 - 14.4 %    Platelets 729 115 - 379 k/uL    MPV 10.5 8.1 - 13.5 fL    NRBC Automated 0.0 0.0 per 100 WBC    RBC Morphology ANISOCYTOSIS PRESENT     Seg Neutrophils 53 36 - 65 %    Lymphocytes 25 24 - 43 %    Monocytes 10 3 - 12 % Eosinophils % 10 (H) 1 - 4 %    Basophils 1 0 - 2 %    Immature Granulocytes 0 0 %    Segs Absolute 2.68 1.50 - 8.10 k/uL    Absolute Lymph # 1.23 1.10 - 3.70 k/uL    Absolute Mono # 0.52 0.10 - 1.20 k/uL    Absolute Eos # 0.52 (H) 0.00 - 0.44 k/uL    Basophils Absolute 0.05 0.00 - 0.20 k/uL    Absolute Immature Granulocyte <0.03 0.00 - 0.30 k/uL   Basic Metabolic Panel    Collection Time: 06/19/22  6:21 AM   Result Value Ref Range    Glucose 150 (H) 70 - 99 mg/dL    BUN 14 8 - 23 mg/dL    CREATININE 2.11 (H) 0.50 - 0.90 mg/dL    Calcium 8.7 8.6 - 10.4 mg/dL    Sodium 138 135 - 144 mmol/L    Potassium 3.5 (L) 3.7 - 5.3 mmol/L    Chloride 100 98 - 107 mmol/L    CO2 27 20 - 31 mmol/L    Anion Gap 11 9 - 17 mmol/L    GFR Non-African American 24 (L) >60 mL/min    GFR  29 (L) >60 mL/min    GFR Comment         Magnesium    Collection Time: 06/19/22  6:21 AM   Result Value Ref Range    Magnesium 2.4 1.6 - 2.6 mg/dL   POC Glucose Fingerstick    Collection Time: 06/19/22  8:39 AM   Result Value Ref Range    POC Glucose 183 (H) 65 - 105 mg/dL       Imaging/Diagnostics:  CT ABDOMEN PELVIS WO CONTRAST Additional Contrast? None    Result Date: 6/10/2022  EXAMINATION: CT OF THE ABDOMEN AND PELVIS WITHOUT CONTRAST 6/10/2022 4:23 pm TECHNIQUE: CT of the abdomen and pelvis was performed without the administration of intravenous contrast. Multiplanar reformatted images are provided for review. Automated exposure control, iterative reconstruction, and/or weight based adjustment of the mA/kV was utilized to reduce the radiation dose to as low as reasonably achievable. COMPARISON: None. HISTORY: ORDERING SYSTEM PROVIDED HISTORY: flank pain, UTI TECHNOLOGIST PROVIDED HISTORY: flank pain, UTI Reason for Exam: flank pain, UTI, nausea Relevant Medical/Surgical History: dialysis, cholecystectomy FINDINGS: Lower Chest: The heart is enlarged. The visualized lung bases are grossly clear.   A hemodialysis catheter ends in the upper right atrium. Trace left pleural effusion. Organs: The spleen is mildly enlarged and measures 14.8 cm in length, although is homogeneous in appearance. The liver has a slightly nodular surface contour which suggests cirrhosis. The gallbladder is surgically absent. The pancreas, kidneys, and adrenal glands have an unremarkable unenhanced CT appearance. Extensive renal arterial vascular calcifications. No renal cortical edema or perinephric inflammatory fat stranding. No renal, ureteral, or bladder calculi. No hydronephrosis or hydroureter. GI/Bowel: The stomach and the small and large bowel loops are normal in caliber, contour, and morphology, without acute or significant abnormality. No dilated loops or areas of bowel wall thickening. The appendix is normal. Peritoneum/Retroperitoneum: There is no free fluid or extraluminal gas. No enlarged or suspicious mesenteric or retroperitoneal lymphadenopathy. The abdominal aorta and iliac arteries are normal in caliber. Pelvis: No pelvic free fluid or enlarged or suspicious pelvic or inguinal lymphadenopathy. No appreciable uterine or adnexal abnormality. Mild diffuse urinary bladder wall thickening as well as a mild degree of surrounding inflammatory fat stranding, findings suggestive of acute cystitis. The pelvic bowel loops are unremarkable. Bones/Soft Tissues: Degenerative and post-surgical changes are present within the lumbar spine. No acute fracture. Mild diffuse urinary bladder wall thickening with surrounding inflammation, findings most likely representing acute cystitis. No findings of acute pyelonephritis. Splenomegaly. Suspected cirrhotic liver morphology. No ascites fluid. Trace left pleural effusion. XR TIBIA FIBULA LEFT (2 VIEWS)    Result Date: 6/18/2022  EXAMINATION: 2 XRAY VIEWS OF THE LEFT TIBIA AND FIBULA; THREE XRAY VIEWS OF THE LEFT ANKLE 6/18/2022 7:47 pm COMPARISON: None.  HISTORY: ORDERING SYSTEM PROVIDED HISTORY: Fall, ankle pain TECHNOLOGIST PROVIDED HISTORY: Fall, ankle pain FINDINGS: There is no evidence of acute fracture. There is normal alignment. No acute joint abnormality. No focal osseous lesion. Swelling about the ankle. Moderate tibiotalar osteoarthritis. Mild widening of the medial clear space. No acute osseous abnormality. Mild widening of the medial clear space of the ankle may reflect ligamentous injury. XR ANKLE LEFT (MIN 3 VIEWS)    Result Date: 6/18/2022  EXAMINATION: 2 XRAY VIEWS OF THE LEFT TIBIA AND FIBULA; THREE XRAY VIEWS OF THE LEFT ANKLE 6/18/2022 7:47 pm COMPARISON: None. HISTORY: ORDERING SYSTEM PROVIDED HISTORY: Fall, ankle pain TECHNOLOGIST PROVIDED HISTORY: Fall, ankle pain FINDINGS: There is no evidence of acute fracture. There is normal alignment. No acute joint abnormality. No focal osseous lesion. Swelling about the ankle. Moderate tibiotalar osteoarthritis. Mild widening of the medial clear space. No acute osseous abnormality. Mild widening of the medial clear space of the ankle may reflect ligamentous injury. CT HEAD WO CONTRAST    Result Date: 6/18/2022  EXAMINATION: CT OF THE HEAD WITHOUT CONTRAST  6/18/2022 1:20 pm TECHNIQUE: CT of the head was performed without the administration of intravenous contrast. Automated exposure control, iterative reconstruction, and/or weight based adjustment of the mA/kV was utilized to reduce the radiation dose to as low as reasonably achievable. COMPARISON: 12/24/2021 HISTORY: ORDERING SYSTEM PROVIDED HISTORY: Confusion after dialysis, syncope TECHNOLOGIST PROVIDED HISTORY: Confusion after dialysis, syncope Decision Support Exception - unselect if not a suspected or confirmed emergency medical condition->Emergency Medical Condition (MA) Reason for Exam: Confusion after dialysis, syncope FINDINGS: BRAIN/VENTRICLES: The cerebral hemispheres, brainstem, and cerebellum have a normal appearance for the patient's age. The falx is midline. The ventricles and peripheral sulci are mildly dilated. There is decreased attenuation in the periventricular white matter. There is no sign of a space occupying lesion, infarction, or hemorrhage. Orbits: Portion of the orbits demonstrate no acute abnormality. SINUSES: . The  imaged portions of the paranasal sinuses are clear. The mastoids and the middle ear chambers are clear. SOFT TISSUES/SKULL:  No acute abnormality of the visualized skull or soft tissues. Vascular calcifications are seen compatible with atherosclerotic disease. Mild central and cortical cerebral atrophy. Mild chronic deep white matter ischemic changes No acute intracranial abnormalities are noted. XR CHEST PORTABLE    Result Date: 6/18/2022  EXAMINATION: ONE XRAY VIEW OF THE CHEST 6/18/2022 12:59 pm COMPARISON: 04/26/2022 HISTORY: ORDERING SYSTEM PROVIDED HISTORY: Syncope, recent dialysis TECHNOLOGIST PROVIDED HISTORY: Syncope, recent dialysis FINDINGS: There is a rightcentral venous catheter with the tip in the right atrium. Cardiac size is enlarged. No acute infiltrates are seen . The pulmonary vascularity is hazy and indistinct. No pneumothorax. No pleural effusions identified . Postsurgical changes overlying the mediastinum and cervical spine.      Cardiomegaly with associated mild pulmonary vascular congestion       Assessment & Differential Dx:      Primary Problem  Hypokalemia    Active Hospital Problems    Diagnosis Date Noted    Dialysis disequilibrium syndrome [E87.8] 06/18/2022     Priority: Medium    Hyperglycemia [R73.9] 06/06/2022     Priority: Medium    Hypokalemia [E87.6] 04/14/2022    End-stage renal disease ([facility] [N18.6] 02/14/2022    AMS (altered mental status) [R41.82]     Anxiety [F41.9] 09/30/2021    Generalized weakness [R53.1] 09/19/2021    Essential hypertension [I10] 05/03/2021    History of coronary artery bypass graft [Z95.1] 03/31/2021    Diabetic polyneuropathy associated with type 2 diabetes mellitus [facility] [E11.42] 02/17/2020    Chronic diastolic heart failure ([facility] [I50.32] 09/20/2018    Type 2 diabetes 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, with long-term current use of insulin ([facility] [E11.22, N18.6, Z99.2, Z79.4] 09/20/2018    Mixed hyperlipidemia [E78.2] 07/27/2016       57years old female patient. Presented on 6/18/2022, has hx of ESRD on HD, prior dialysis disequilibrium syndrome. Presented with syncope during dialysis with patient becoming altered and completely disoriented with amnesia. Had multiple similar episodes in the past.  Likely a case of dialysis disequilibrium syndrome    Plan:      No further neurological testing needed. Likely a case of dialysis disequilibrium syndrome.  Need careful HD monitoring with BP   Neurology will sign off         Karen Caputo MD, MD, 6/19/2022 10:29 AM

## 2022-06-19 NOTE — DISCHARGE INSTR - COC
Continuity of Care Form    Patient Name: Laisha Montes   :  1960  MRN:  [id number]    Admit date:  2022  Discharge date:  22    Code Status Order: Full Code   Advance Directives:      Admitting Physician:  Eugene Contreras MD  PCP: Racheal Remy, APRN - CNP    Discharging Nurse: YRN Villafana [facility] Unit/Room#:   Discharging Unit Phone Number: [phone]    Emergency Contact:   Extended Emergency Contact Information  Primary Emergency Contact:  [address], [address] Phone: [phone]  Relation: Brother/Sister  Secondary Emergency Contact: Autumn Lau, [address] Phone: [phone]  Relation: Brother/Sister    Past Surgical History:  Past Surgical History:   Procedure Laterality Date    ANKLE FRACTURE SURGERY      AV FISTULA CREATION  2021    BACK SURGERY      BREAST SURGERY      CARDIAC SURGERY      CARPAL TUNNEL RELEASE      CHOLECYSTECTOMY, OPEN N/A     COLONOSCOPY      CORONARY ARTERY BYPASS GRAFT      x3    DIALYSIS FISTULA CREATION Left 2021    LEFT AV FISTULA CREATION UPPER EXTREMITY performed by Makenzie Ngo MD at [facility]      IR TUNNELED [address] 5 YEARS  2021    IR TUNNELED CATHETER PLACEMENT GREATER THAN 5 YEARS 2021 [facility] SPECIAL PROCEDURES    KNEE ARTHROSCOPY      right    OTHER SURGICAL HISTORY  2022    cvc exchange    TONSILLECTOMY         Immunization History:   Immunization History   Administered Date(s) Administered    COVID-19, Pfizer Purple top, DILUTE for use, 12+ yrs, 30mcg/0.3mL dose 2021, 2021    Influenza Virus Vaccine 10/18/2018, 09/10/2019, 2020    Influenza, MDCK Quadv, IM, PF (Flucelvax 2 yrs and older) 2021    Influenza, Quadv, IM, (6 mo and older Fluzone, Flulaval, Fluarix and 3 yrs and older Afluria) 10/16/2017    Influenza, Quadv, IM, PF (6 mo and older Fluzone, Flulaval, Fluarix, and 3 yrs and older Afluria) 10/18/2018, 09/10/2019, 09/20/2019, 11/03/2020    Pneumococcal Conjugate 13-valent (Kvxlppd64) 08/06/2019    Pneumococcal Polysaccharide (Cbsqxcqhn29) 10/30/2014    Tdap (Boostrix, Adacel) 11/18/2017       Active Problems:  Patient Active Problem List   Diagnosis Code    Atherosclerosis of coronary artery bypass graft of native heart without angina pectoris I25.810    Chronic diastolic heart failure ([facility]) I50.32    Diabetic polyneuropathy associated with type 2 diabetes mellitus ([facility]) E11.42    History of coronary artery bypass graft Z95.1    Iron deficiency anemia D50.9    Spinal stenosis of lumbar region with neurogenic claudication M48.062    Mixed hyperlipidemia E78.2    Type 2 diabetes 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, with long-term current use of insulin ([facility]) E11.22, N18.6, Z99.2, Z79.4    Obesity, Class II, BMI 35-39.9 E66.9    Thyroid nodule greater than or equal to 1 cm in diameter incidentally noted on imaging study E04.1    Essential hypertension I10    Chronic ischemic heart disease I25.9    Ischemic stroke of frontal lobe ([facility]) I63.9    Morbid obesity with BMI of 40.0-44.9, adult ([facility]) E66.01, Z68.41    Disequilibrium syndrome E87.8    Generalized weakness R53.1    Long-term memory loss R41.3    Muscle right arm weakness M62.81    Anxiety F41.9    Chronic midline low back pain with bilateral sciatica M54.41, M54.42, G89.29    AMS (altered mental status) R41.82    Tremor R25.1    COVID U07.1    End-stage renal disease ([facility] N18.6    Diabetic ketoacidosis without coma associated with type 2 diabetes mellitus ([facility]) E11.10    Hypokalemia E87.6    Confusion R41.0    Myoclonic jerking G25.3    Hyperglycemia R73.9    Dialysis disequilibrium syndrome E87.8       Isolation/Infection:   Isolation            No Isolation          Patient Infection Status       Infection Onset Added Last Indicated Last Indicated By Review Planned Expiration Resolved Resolved By    CHAEVZ 22 Culture, Urine        MRSA 20 Sharlotte Lombard, RN        Added from external infection. Resolved    COVID-19 (Rule Out) 22 COVID-19, Rapid (Ordered)   22 Rule-Out Test Resulted    COVID-19 (Rule Out) 22 COVID-19 & Influenza Combo (Ordered)   22 Rule-Out Test Resulted    COVID-19 (Rule Out) 22 COVID-19 & Influenza Combo (Ordered)   04/15/22 Veronica Camargo RN    COVID-19 (Rule Out) 22 COVID-19 & Influenza Combo (Ordered)   22 Rule-Out Test Resulted    COVID-19 (Rule Out) 22 COVID-19 (Ordered)   22 Rule-Out Test Resulted    COVID-19 22 COVID-19   22     COVID-19 (Rule Out) 22 COVID-19 (Ordered)   22 Rule-Out Test Resulted            Nurse Assessment:  Last Vital Signs: BP (!) 118/52   Pulse 70   Temp 98.1 °F (36.7 °C) (Oral)   Resp 15   SpO2 97%     Last documented pain score (0-10 scale): Pain Level: 4  Last Weight:   Wt Readings from Last 1 Encounters:   06/15/22 218 lb (98.9 kg)     Mental Status:  oriented, alert, coherent, logical, thought processes intact, and able to concentrate and follow conversation    IV Access:  - None    Nursing Mobility/ADLs:  Walking   Assisted  Transfer  Assisted  Bathing  Assisted  Dressing  Assisted  Toileting  Assisted  Feeding  Independent  Med [address]  Assisted  Med Delivery   whole    Wound Care Documentation and Therapy:        Elimination:  Continence: Bowel: Yes  Bladder: Yes  Urinary Catheter: None   Colostomy/Ileostomy/Ileal Conduit: No       Date of Last BM: 22  No intake or output data in the 24 hours ending 22 1149  No intake/output data recorded. Safety Concerns:      At Risk for Falls    Impairments/Disabilities:      None    Nutrition Therapy:  Current Nutrition Therapy:   - Oral Diet: Renal    Routes of Feeding: Oral  Liquids: No Restrictions  Daily Fluid Restriction: no  Last Modified Barium Swallow with Video (Video Swallowing Test): not done    Treatments at the Time of Hospital Discharge:   Respiratory Treatments: None  Oxygen Therapy:  is not on home oxygen therapy. Ventilator:    - No ventilator support    Rehab Therapies: Physical Therapy and Occupational Therapy  Weight Bearing Status/Restrictions: No weight bearing restrictions  Other Medical Equipment (for information only, NOT a DME order):  cane and walker  Other Treatments: ***    Patient's personal belongings (please select all that are sent with patient):  Glasses    RN SIGNATURE:  Electronically signed by Karon Gomez RN on 6/19/22 at 1:16 PM EDT    CASE MANAGEMENT/SOCIAL WORK SECTION    Inpatient Status Date: 6/18/2022    Readmission Risk Assessment Score:  Readmission Risk              Risk of Unplanned Readmission:  39           Discharging to Facility/ Agency   Name:   [facility] Details  Fax          [facility] [facility] [address]       Phone: [phone]       Fax: [phone]        Address:  Phone:  Fax:    Dialysis Facility (if applicable)   Name:  Address:  Dialysis Schedule:  Phone:  Fax:    / signature: Electronically signed by Hardeep Mcqueen RN on 6/19/22 at 12:11 PM EDT    PHYSICIAN SECTION    Prognosis: Good    Condition at Discharge: Stable    Rehab Potential (if transferring to Rehab): Fair    Recommended Labs or Other Treatments After Discharge: none     Physician Certification: I certify the above information and transfer of Peterson Marte  is necessary for the continuing treatment of the diagnosis listed and that she requires Home Care for greater 30 days.      Update Admission H&P: No change in H&P    PHYSICIAN SIGNATURE:  Electronically signed by Tom Blank MD on 6/19/22 at 12:33 PM EDT

## 2022-06-19 NOTE — CARE COORDINATION
Pt discharging home, [facility] admissions Felicianogettona Conn notified pt discharging for resumption of care     RN notified will need ANTHONY completion    Discharge Report    [facility] Case Management Department  Written by: Roland Camargo RN    Patient Name: Abiola Dangelo  Attending Provider: Isac Mathur MD  Admit Date: 2022  3:53 PM  MRN: [id number]  Account: [de-identified]                     : 1960  Discharge Date: 22       Disposition: home with Yash Deras RN

## 2022-06-20 ENCOUNTER — CARE COORDINATION (OUTPATIENT)
Dept: OTHER | Facility: CLINIC | Age: 62
End: 2022-06-20

## 2022-06-20 DIAGNOSIS — R41.0 CONFUSION: Primary | ICD-10-CM

## 2022-06-20 LAB
EKG ATRIAL RATE: 68 BPM
EKG P AXIS: 64 DEGREES
EKG P-R INTERVAL: 154 MS
EKG Q-T INTERVAL: 502 MS
EKG QRS DURATION: 100 MS
EKG QTC CALCULATION (BAZETT): 533 MS
EKG R AXIS: 9 DEGREES
EKG T AXIS: 106 DEGREES
EKG VENTRICULAR RATE: 68 BPM

## 2022-06-20 PROCEDURE — 93010 ELECTROCARDIOGRAM REPORT: CPT | Performed by: INTERNAL MEDICINE

## 2022-06-20 NOTE — CARE COORDINATION
Divya [facility] Transitions Initial Follow Up Call    Call within 2 business days of discharge: Yes    Patient: Robert Frost Patient : 1960   MRN: [id number]  Reason for Admission: Hypokalemia, DM2, confusion  Discharge Date: 22 RARS: Readmission Risk Score: 35.8 ( )      Last Discharge [facility]       Complaint Diagnosis Description Type Department Provider    22 Altered Mental Status Hypokalemia . .. ED to Hosp-Admission (Discharged) (ADMITTED) [facility] 2 Owen Matos MD; [address] .. Spoke with: [address]: [facility]    Transitions of Care Initial Call    Pt was readmitted to [address] [place] [address] on 22 for altered mental status and lightheadedness during dialysis and found to have acute metabolic encephalopathy likely secondary to dialysis disequilibrium syndrome along with hypokalemia and hypomagnesemia. Stated she fell, thought she broke her leg but was found to have a bruised ligament. Stated she feels \"rough\" today. Pt was very confused at admission, still having issues remembering family. CT scan was unremarkable on 22. Stated sugars have been @ 175-180 since discharge. Pt will start wearing supplemental 02 NC at night. Stated she is a shallow sleeper, tried CPAP in hospital and couldn't tolerate it. Stated [facility] is coming tomorrow and she has Dialysis on   as well. Pt is agreeable to have writer schedule sleep study. No referral or orders for sleep study noted. Will request referral from PCP. Declined asst making f/u appts with Dr. Luis Lopez has Urology appt on 22. Sent message to PCP requesting sleep study order. Pt would like sleep study as Gillis. Challenges to be reviewed by the provider   Additional needs identified to be addressed with provider: No  none             Method of communication with provider : none      Care Transition Nurse contacted the patient by telephone to perform post hospital discharge assessment.  Verified name and  with patient as identifiers. Provided introduction to self, and explanation of the CTN role. CTN reviewed discharge instructions, medical action plan and red flags with patient who verbalized understanding. Patient given an opportunity to ask questions and does not have any further questions or concerns at this time. Were discharge instructions available to patient? Yes. Reviewed appropriate site of care based on symptoms and resources available to patient including: PCP  Specialist  Home health  When to call [address] The patient agrees to contact the PCP office for questions related to their healthcare. Medication reconciliation was performed with patient, who verbalizes understanding of administration of home medications. Advised obtaining a 90-day supply of all daily and as-needed medications. CTN provided contact information. Plan for follow-up call in 1-2 days based on severity of symptoms and risk factors. Plan for next call: referrals-sleep study, [facility] after referrall in system. Confusion? Sugars? BP? HHC, f/u appts?       Care Transitions 24 Hour Call    Do you have all of your prescriptions and are they filled?: Yes  Patient Home Equipment: Other (Comment: pulse ox)  Do you have support at home?: Parent(s)  Care Transitions Interventions         Follow Up  Future Appointments   Date Time Provider [facility]   7/5/2022  2:30 PM Shreyas Garcia MD [address] [place] [address]   7/29/2022 11:00 AM Ce Ibarra MD Resp Spec [facility]   8/11/2022  2:20 PM Justino Canales MD Neuro Spec [facility]   8/19/2022 10:45 AM Melany Briones, APRN - [facility] Lorna Bañuelos RN

## 2022-06-22 ENCOUNTER — CARE COORDINATION (OUTPATIENT)
Dept: OTHER | Facility: CLINIC | Age: 62
End: 2022-06-22

## 2022-06-22 NOTE — DISCHARGE SUMMARY
[address] [facility]     Department of Internal Medicine - Staff Internal Medicine Teaching Service    INPATIENT DISCHARGE SUMMARY      Patient Identification:  Ridge Mckinney is a 57 y.o. female. :  1960  MRN: [id number]     Acct: [de-identified]   PCP: AFSANEH Zhou CNP  Admit Date:  2022  Discharge date and time: 2022  7:54 PM   Attending Provider: Dr. Mikel Koch Problem Lists:  Principal Problem:    Hypokalemia  Active Problems:    Dialysis disequilibrium syndrome    Hyperglycemia    Cerebral hypoperfusion    Chronic diastolic heart failure (HCC)    Diabetic polyneuropathy associated with type 2 diabetes mellitus ([facility]    History of coronary artery bypass graft    Mixed hyperlipidemia    Type 2 diabetes 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, with long-term current use of insulin (HCC)    Essential hypertension    Generalized weakness    Anxiety    AMS (altered mental status)    End-stage renal disease ([facility]  Resolved Problems:    * No resolved hospital problems. [facility] STAY     Brief Inpatient course:   Ridge Mckinney is a 57 y.o. female with PMH significant of ESRD (on TTS schedule), diastolic CHF, chronic respiratory failure (on 3 L O2), CAD, morbid obesity,    Who was admitted for the management of Hypokalemia,     presented to the emergency department with altered mental status and lightheadedness during dialysis. Her blood pressure improved with midodrine but she became significantly confused and disoriented and was therefore brought to [facility] for further work-up and treatment. She remained hemodynamically stable but significantly confused, disoriented and tearful. EKG showed nonspecific changes like inversion in V3 flattening of V4, prolonged QTC. Neurology was consulted in the ER for evaluation of altered mental status.   She underwent CT head without contrast which showed mild central and cortical cerebral atrophy. Mild chronic deep white matter ischemic changes. But no acute intracranial abnormality. The plan was to go for LTM E and MRI brain if no improvement. She had significant electrolyte abnormalities like hypokalemia and hypomagnesemia which were replaced appropriately. On repeat evaluation, her mentation improved significantly. LTME and MRI brain were deferred. All her home medications were resumed. She was encouraged to continue and be compliant with hemodialysis. For her blood pressure, she was started on midodrine 2.5 mg 3 times daily. Which helped. Nephrology was consulted, they recommended to resume her regular hemodialysis schedule under the care of Dr. Natali Rushing.  As per nephrology, her altered mental status was likely secondary to hypotension on dialysis which then caused her to have brief syncope. As she returned to her normal baseline, she was cleared for discharge. Her blood pressure and mentation improved. She was discharged once medically cleared with instructions as given below. Procedures  Hemodialysis as per her TTS schedule. Consults:     Consults:     Final Specialist Recommendations/Findings:   IP CONSULT TO NEUROLOGY  IP CONSULT TO INTERNAL MEDICINE  IP CONSULT TO NEPHROLOGY  IP CONSULT TO CASE MANAGEMENT      Any Hospital Acquired Infections: none    Discharge Functional Status:  stable    DISCHARGE PLAN     Disposition: home    Patient Instructions:   Discharge Medication List as of 6/19/2022  3:47 PM        CONTINUE these medications which have NOT CHANGED    Details   carvedilol (COREG) 3.125 MG tablet Take 1 tablet by mouth 2 times daily, Disp-60 tablet, R-11Normal      insulin glargine (BASAGLAR KWIKPEN) 100 UNIT/ML injection pen Inject 50 Units into the skin 2 times daily, Disp-5 pen, R-11Normal      miconazole (MICOTIN) 2 % powder Apply topically 2 times daily. , Disp-45 g, R-1, Normal      melatonin 10 MG CAPS capsule Take 10 mg by mouth nightlyHistorical Med      lidocaine-prilocaine (EMLA) 2.5-2.5 % cream Apply topically as needed for Pain Indications: Apply to dialysis site Apply topically as needed. , Topical, PRN, Historical Med      traZODone (DESYREL) 50 MG tablet TAKE 1 & 1/2 (ONE & ONE-HALF) TABLETS BY MOUTH NIGHTLY, Disp-45 tablet, R-11Normal      acetaminophen (TYLENOL) 325 MG tablet Take 650 mg by mouth every 6 hours as needed for Pain Historical Med      furosemide (LASIX) 80 MG tablet Take 1 tablet by mouth 2 times daily, Disp-60 tablet, R-0Normal      sodium bicarbonate 650 MG tablet Take 1 tablet by mouth 3 times daily, Disp-90 tablet, R-0Normal      Insulin Pen Needle (EASY TOUCH PEN NEEDLES) 29G X 12MM MISC DAILY Starting Tue 3/1/2022, Disp-100 each, R-5, Normal      !! ReliOn Lancets Micro-Thin 33G MISC USE AS DIRECTED EVERY DAYHistorical Med      HUMALOG 100 UNIT/ML injection vial Inject into the skin 3 times daily (before meals) Indications: 15 units and sliding scale (patient reports only the sliding scale) , DAWHistorical Med      Blood Glucose Monitoring Suppl (TRUE METRIX GO GLUCOSE METER) w/Device KIT 1 each by Does not apply route 4 times daily, Disp-1 kit, R-1Normal      !!  Lancets MISC DAILY Starting Tue 11/30/2021, Disp-100 each, R-11, Normal      febuxostat (ULORIC) 40 MG TABS tablet Take 1 tablet by mouth daily, Disp-30 tablet, R-0Normal      docusate sodium (COLACE) 100 MG capsule Take 1 capsule by mouth 2 times daily, Disp-60 capsule, R-0Normal      tamsulosin (FLOMAX) 0.4 MG capsule Take 1 capsule by mouth daily, Disp-30 capsule, R-3Normal      atorvastatin (LIPITOR) 80 MG tablet Take 1 tablet by mouth daily, Disp-30 tablet, R-5Normal      aspirin 81 MG chewable tablet Take 1 tablet by mouth daily, Disp-30 tablet, R-0Normal      ranolazine (RANEXA) 1000 MG extended release tablet Take 1 tablet by mouth 2 times daily, Disp-60 tablet, R-0Normal      busPIRone (BUSPAR) 7.5 MG tablet Take 1 tablet by mouth 3 times daily, Disp-90 tablet, R-0Normal      pantoprazole (PROTONIX) 40 MG tablet Take 1 tablet by mouth every morning (before breakfast), Disp-30 tablet, R-0Normal       !! - Potential duplicate medications found. Please discuss with provider. Activity: activity as tolerated    Diet: cardiac diet, low fat, low cholesterol diet and renal diet    Follow-up:    AFSANEH Henry - CNP  [facility]  [address]  [address] [address]  [phone]    Call  Call to be seen for a follow up appointment  in 1 week. Patient Instructionse: You were admitted for low blood pressure and confusion during dialysis. Confusion was likely secondary to low blood pressure. Please continue to take all your medications as prescribed. Continue to go for hemodialysis as per scheduled days. Follow-up with your nephrologist to discuss management of low blood pressure during dialysis. Please come back to ER or seek medical attention soon if your symptoms worsen. Follow up labs: none  Follow up imaging: none    Note that over 30 minutes was spent in preparing discharge papers, discussing discharge with patient, medication review, etc.      Kunal Hunt MD,  Internal Medicine Resident, PGY-1  [facility];  [place], [place]  6/22/2022, 1:12 PM

## 2022-06-22 NOTE — CARE COORDINATION
Divya 45 Transitions Follow Up Call    2022    Patient: Everette Meckel  Patient : 1960   MRN: [id number]  Reason for Admission: Hyperglycemia, hypokalemia    Discharge Date: 22 RARS: Readmission Risk Score: 35.8 ( )         Spoke with: Hany Mejía to Vincent Hilliard who stated she is doing OK, has extension with [facility]. Pt has SN and PT visits on 22. Pt has Podiatry appt on 22. Stated leg injury from fall is improved, leg is no longer wrapped. Pt had to cancel Urology on 22 and is rescheduled for 22. Pt has Dialysis on T/Th/Sat. Still needs sleep study scheduled. PCP placed order on 22. Writer called and left message to [facility] with writer and pt's contact numbers to schedule. Pt stated her memory is improving since recent admission, back spasms improved. Sugars are @ 159-201. Will f/u for transitions. Needs to be reviewed by the provider   Additional needs identified to be addressed with provider: No  none             Method of communication with provider : none      Care Transition Nurse contacted the patient by telephone to follow up after admission on 22. Verified name and  with patient as identifiers. Addressed changes since last contact: needs sleep study, order placed. Has ext on [facility] PT and SN  Discussed follow-up appointments. If no appointment was previously scheduled, appointment scheduling offered: Yes. CTN reviewed discharge instructions, medical action plan and red flags with patient and discussed any barriers to care and/or understanding of plan of care after discharge. Discussed appropriate site of care based on symptoms and resources available to patient including: PCP  Specialist  Home health  When to call [address] The patient agrees to contact the PCP office for questions related to their healthcare.      Patients top risk factors for readmission: medical condition-DM 2, ESRD, CKD  Interventions to address risk factors: Obtained and reviewed discharge summary and/or continuity of care documents      CTN provided contact information for future needs. Plan for follow-up call in 1-2 days based on severity of symptoms and risk factors. Plan for next call: symptom management-sugars, back pan  follow up appointment-needs sleep study scheduled. Did she receive call from sleep study Shannan Melgoza? Care Transitions Subsequent and Final Call    Subsequent and Final Calls  Do you have any ongoing symptoms?: No  Have your medications changed?: No  Do you have any questions related to your medications?: No  Do you currently have any active services?: Yes  Are you currently active with any services?: Home Health  Do you have any needs or concerns that I can assist you with?: Yes  Patient-reported Needs or Concerns: needs sleep study scheduled  Identified Barriers: None  Care Transitions Interventions    Registered Dietician: Completed    Other Interventions:            Follow Up  Future Appointments   Date Time Provider Balbir Palacios   7/29/2022 11:00 AM Zoey Agrawal MD Resp Spec [facility]   8/5/2022 10:10 AM Carolina Brandt MD [facility]   8/11/2022  2:20 PM [facility]   8/19/2022 10:45 AM Viri Hayward, APRN - [address] Cesar Hendrix RN

## 2022-06-23 ENCOUNTER — HOSPITAL ENCOUNTER (INPATIENT)
Age: 62
LOS: 8 days | Discharge: NURSING, REHAB OR LONG-TERM CARE | DRG: 091 | End: 2022-07-01
Attending: EMERGENCY MEDICINE | Admitting: INTERNAL MEDICINE
Payer: COMMERCIAL

## 2022-06-23 ENCOUNTER — APPOINTMENT (OUTPATIENT)
Dept: CT IMAGING | Age: 62
DRG: 091 | End: 2022-06-23
Payer: COMMERCIAL

## 2022-06-23 DIAGNOSIS — M62.838 SPASM OF MUSCLE: Primary | ICD-10-CM

## 2022-06-23 DIAGNOSIS — E87.6 HYPOKALEMIA: ICD-10-CM

## 2022-06-23 DIAGNOSIS — M62.838 MUSCLE SPASM: ICD-10-CM

## 2022-06-23 PROBLEM — M54.50 LOW BACK PAIN: Status: ACTIVE | Noted: 2022-06-23

## 2022-06-23 LAB
ABSOLUTE EOS #: 0.22 K/UL (ref 0–0.44)
ABSOLUTE IMMATURE GRANULOCYTE: 0.03 K/UL (ref 0–0.3)
ABSOLUTE LYMPH #: 0.77 K/UL (ref 1.1–3.7)
ABSOLUTE MONO #: 0.35 K/UL (ref 0.1–1.2)
ANION GAP SERPL CALCULATED.3IONS-SCNC: 14 MMOL/L (ref 9–17)
BASOPHILS # BLD: 1 % (ref 0–2)
BASOPHILS ABSOLUTE: <0.03 K/UL (ref 0–0.2)
BUN BLDV-MCNC: 15 MG/DL (ref 8–23)
CALCIUM SERPL-MCNC: 9.2 MG/DL (ref 8.6–10.4)
CHLORIDE BLD-SCNC: 94 MMOL/L (ref 98–107)
CO2: 27 MMOL/L (ref 20–31)
CREAT SERPL-MCNC: 1.72 MG/DL (ref 0.5–0.9)
EOSINOPHILS RELATIVE PERCENT: 5 % (ref 1–4)
GFR AFRICAN AMERICAN: 36 ML/MIN
GFR NON-AFRICAN AMERICAN: 30 ML/MIN
GFR SERPL CREATININE-BSD FRML MDRD: ABNORMAL ML/MIN/{1.73_M2}
GLUCOSE BLD-MCNC: 220 MG/DL (ref 65–105)
GLUCOSE BLD-MCNC: 274 MG/DL (ref 70–99)
HCT VFR BLD CALC: 33.4 % (ref 36.3–47.1)
HEMOGLOBIN: 10.9 G/DL (ref 11.9–15.1)
IMMATURE GRANULOCYTES: 1 %
LYMPHOCYTES # BLD: 18 % (ref 24–43)
MAGNESIUM: 2 MG/DL (ref 1.6–2.6)
MCH RBC QN AUTO: 31.7 PG (ref 25.2–33.5)
MCHC RBC AUTO-ENTMCNC: 32.6 G/DL (ref 28.4–34.8)
MCV RBC AUTO: 97.1 FL (ref 82.6–102.9)
MONOCYTES # BLD: 8 % (ref 3–12)
NRBC AUTOMATED: 0 PER 100 WBC
PDW BLD-RTO: 17.4 % (ref 11.8–14.4)
PLATELET # BLD: 166 K/UL (ref 138–453)
PMV BLD AUTO: 10.9 FL (ref 8.1–13.5)
POTASSIUM SERPL-SCNC: 3.3 MMOL/L (ref 3.7–5.3)
RBC # BLD: 3.44 M/UL (ref 3.95–5.11)
RBC # BLD: ABNORMAL 10*6/UL
SARS-COV-2, RAPID: NOT DETECTED
SEG NEUTROPHILS: 67 % (ref 36–65)
SEGMENTED NEUTROPHILS ABSOLUTE COUNT: 2.9 K/UL (ref 1.5–8.1)
SODIUM BLD-SCNC: 135 MMOL/L (ref 135–144)
SPECIMEN DESCRIPTION: NORMAL
WBC # BLD: 4.3 K/UL (ref 3.5–11.3)

## 2022-06-23 PROCEDURE — G0378 HOSPITAL OBSERVATION PER HR: HCPCS

## 2022-06-23 PROCEDURE — 6370000000 HC RX 637 (ALT 250 FOR IP): Performed by: EMERGENCY MEDICINE

## 2022-06-23 PROCEDURE — 82947 ASSAY GLUCOSE BLOOD QUANT: CPT

## 2022-06-23 PROCEDURE — 72128 CT CHEST SPINE W/O DYE: CPT

## 2022-06-23 PROCEDURE — 96374 THER/PROPH/DIAG INJ IV PUSH: CPT

## 2022-06-23 PROCEDURE — 6360000002 HC RX W HCPCS: Performed by: GENERAL PRACTICE

## 2022-06-23 PROCEDURE — 2580000003 HC RX 258: Performed by: EMERGENCY MEDICINE

## 2022-06-23 PROCEDURE — 6370000000 HC RX 637 (ALT 250 FOR IP): Performed by: GENERAL PRACTICE

## 2022-06-23 PROCEDURE — 6360000002 HC RX W HCPCS: Performed by: EMERGENCY MEDICINE

## 2022-06-23 PROCEDURE — 80048 BASIC METABOLIC PNL TOTAL CA: CPT

## 2022-06-23 PROCEDURE — 99285 EMERGENCY DEPT VISIT HI MDM: CPT

## 2022-06-23 PROCEDURE — 87635 SARS-COV-2 COVID-19 AMP PRB: CPT

## 2022-06-23 PROCEDURE — 83735 ASSAY OF MAGNESIUM: CPT

## 2022-06-23 PROCEDURE — 1200000000 HC SEMI PRIVATE

## 2022-06-23 PROCEDURE — 96375 TX/PRO/DX INJ NEW DRUG ADDON: CPT

## 2022-06-23 PROCEDURE — 72125 CT NECK SPINE W/O DYE: CPT

## 2022-06-23 PROCEDURE — 85025 COMPLETE CBC W/AUTO DIFF WBC: CPT

## 2022-06-23 RX ORDER — MORPHINE SULFATE 4 MG/ML
4 INJECTION, SOLUTION INTRAMUSCULAR; INTRAVENOUS
Status: DISCONTINUED | OUTPATIENT
Start: 2022-06-23 | End: 2022-06-27

## 2022-06-23 RX ORDER — TRAZODONE HYDROCHLORIDE 50 MG/1
75 TABLET ORAL NIGHTLY
Status: DISCONTINUED | OUTPATIENT
Start: 2022-06-23 | End: 2022-06-25

## 2022-06-23 RX ORDER — RANOLAZINE 500 MG/1
1000 TABLET, EXTENDED RELEASE ORAL 2 TIMES DAILY
Status: DISCONTINUED | OUTPATIENT
Start: 2022-06-23 | End: 2022-07-01 | Stop reason: HOSPADM

## 2022-06-23 RX ORDER — ACETAMINOPHEN 325 MG/1
650 TABLET ORAL EVERY 4 HOURS PRN
Status: DISCONTINUED | OUTPATIENT
Start: 2022-06-23 | End: 2022-07-01 | Stop reason: HOSPADM

## 2022-06-23 RX ORDER — INSULIN LISPRO 100 [IU]/ML
0-9 INJECTION, SOLUTION INTRAVENOUS; SUBCUTANEOUS NIGHTLY
Status: DISCONTINUED | OUTPATIENT
Start: 2022-06-23 | End: 2022-06-26

## 2022-06-23 RX ORDER — SODIUM CHLORIDE 0.9 % (FLUSH) 0.9 %
5-40 SYRINGE (ML) INJECTION PRN
Status: DISCONTINUED | OUTPATIENT
Start: 2022-06-23 | End: 2022-07-01 | Stop reason: HOSPADM

## 2022-06-23 RX ORDER — MORPHINE SULFATE 4 MG/ML
2 INJECTION, SOLUTION INTRAMUSCULAR; INTRAVENOUS ONCE
Status: COMPLETED | OUTPATIENT
Start: 2022-06-23 | End: 2022-06-23

## 2022-06-23 RX ORDER — DEXTROSE MONOHYDRATE 50 MG/ML
100 INJECTION, SOLUTION INTRAVENOUS PRN
Status: DISCONTINUED | OUTPATIENT
Start: 2022-06-23 | End: 2022-07-01 | Stop reason: HOSPADM

## 2022-06-23 RX ORDER — DIAZEPAM 5 MG/1
5 TABLET ORAL ONCE
Status: COMPLETED | OUTPATIENT
Start: 2022-06-23 | End: 2022-06-23

## 2022-06-23 RX ORDER — PANTOPRAZOLE SODIUM 40 MG/1
40 TABLET, DELAYED RELEASE ORAL
Status: DISCONTINUED | OUTPATIENT
Start: 2022-06-24 | End: 2022-07-01 | Stop reason: HOSPADM

## 2022-06-23 RX ORDER — BUSPIRONE HYDROCHLORIDE 10 MG/1
10 TABLET ORAL 3 TIMES DAILY
Status: DISCONTINUED | OUTPATIENT
Start: 2022-06-23 | End: 2022-06-27

## 2022-06-23 RX ORDER — INSULIN LISPRO 100 [IU]/ML
0-18 INJECTION, SOLUTION INTRAVENOUS; SUBCUTANEOUS
Status: DISCONTINUED | OUTPATIENT
Start: 2022-06-24 | End: 2022-06-26

## 2022-06-23 RX ORDER — ONDANSETRON 2 MG/ML
4 INJECTION INTRAMUSCULAR; INTRAVENOUS ONCE
Status: COMPLETED | OUTPATIENT
Start: 2022-06-23 | End: 2022-06-23

## 2022-06-23 RX ORDER — ORPHENADRINE CITRATE 30 MG/ML
30 INJECTION INTRAMUSCULAR; INTRAVENOUS ONCE
Status: COMPLETED | OUTPATIENT
Start: 2022-06-23 | End: 2022-06-23

## 2022-06-23 RX ORDER — MORPHINE SULFATE 4 MG/ML
4 INJECTION, SOLUTION INTRAMUSCULAR; INTRAVENOUS ONCE
Status: COMPLETED | OUTPATIENT
Start: 2022-06-23 | End: 2022-06-23

## 2022-06-23 RX ORDER — DOCUSATE SODIUM 100 MG/1
100 CAPSULE, LIQUID FILLED ORAL 2 TIMES DAILY
Status: DISCONTINUED | OUTPATIENT
Start: 2022-06-23 | End: 2022-07-01 | Stop reason: HOSPADM

## 2022-06-23 RX ORDER — FUROSEMIDE 40 MG/1
80 TABLET ORAL 2 TIMES DAILY
Status: DISCONTINUED | OUTPATIENT
Start: 2022-06-24 | End: 2022-07-01 | Stop reason: HOSPADM

## 2022-06-23 RX ORDER — TAMSULOSIN HYDROCHLORIDE 0.4 MG/1
0.4 CAPSULE ORAL DAILY
Status: DISCONTINUED | OUTPATIENT
Start: 2022-06-24 | End: 2022-07-01 | Stop reason: HOSPADM

## 2022-06-23 RX ORDER — INSULIN GLARGINE 100 [IU]/ML
50 INJECTION, SOLUTION SUBCUTANEOUS 2 TIMES DAILY
Status: DISCONTINUED | OUTPATIENT
Start: 2022-06-23 | End: 2022-06-30

## 2022-06-23 RX ORDER — ONDANSETRON 2 MG/ML
4 INJECTION INTRAMUSCULAR; INTRAVENOUS EVERY 6 HOURS PRN
Status: DISCONTINUED | OUTPATIENT
Start: 2022-06-23 | End: 2022-07-01 | Stop reason: HOSPADM

## 2022-06-23 RX ORDER — ENOXAPARIN SODIUM 100 MG/ML
40 INJECTION SUBCUTANEOUS DAILY
Status: DISCONTINUED | OUTPATIENT
Start: 2022-06-24 | End: 2022-06-24

## 2022-06-23 RX ORDER — SODIUM CHLORIDE 0.9 % (FLUSH) 0.9 %
5-40 SYRINGE (ML) INJECTION EVERY 12 HOURS SCHEDULED
Status: DISCONTINUED | OUTPATIENT
Start: 2022-06-23 | End: 2022-07-01 | Stop reason: HOSPADM

## 2022-06-23 RX ORDER — ONDANSETRON 4 MG/1
4 TABLET, ORALLY DISINTEGRATING ORAL EVERY 8 HOURS PRN
Status: DISCONTINUED | OUTPATIENT
Start: 2022-06-23 | End: 2022-07-01 | Stop reason: HOSPADM

## 2022-06-23 RX ORDER — SODIUM CHLORIDE 9 MG/ML
INJECTION, SOLUTION INTRAVENOUS PRN
Status: DISCONTINUED | OUTPATIENT
Start: 2022-06-23 | End: 2022-07-01 | Stop reason: HOSPADM

## 2022-06-23 RX ORDER — MORPHINE SULFATE 2 MG/ML
2 INJECTION, SOLUTION INTRAMUSCULAR; INTRAVENOUS
Status: DISCONTINUED | OUTPATIENT
Start: 2022-06-23 | End: 2022-06-27

## 2022-06-23 RX ORDER — CARVEDILOL 3.12 MG/1
3.12 TABLET ORAL 2 TIMES DAILY
Status: DISCONTINUED | OUTPATIENT
Start: 2022-06-23 | End: 2022-07-01 | Stop reason: HOSPADM

## 2022-06-23 RX ORDER — ASPIRIN 81 MG/1
81 TABLET, CHEWABLE ORAL DAILY
Status: DISCONTINUED | OUTPATIENT
Start: 2022-06-24 | End: 2022-07-01 | Stop reason: HOSPADM

## 2022-06-23 RX ADMIN — CARVEDILOL 3.12 MG: 3.12 TABLET, FILM COATED ORAL at 23:41

## 2022-06-23 RX ADMIN — INSULIN GLARGINE 50 UNITS: 100 INJECTION, SOLUTION SUBCUTANEOUS at 23:42

## 2022-06-23 RX ADMIN — ONDANSETRON 4 MG: 2 INJECTION INTRAMUSCULAR; INTRAVENOUS at 17:07

## 2022-06-23 RX ADMIN — MORPHINE SULFATE 4 MG: 4 INJECTION INTRAVENOUS at 22:55

## 2022-06-23 RX ADMIN — TRAZODONE HYDROCHLORIDE 75 MG: 50 TABLET ORAL at 23:35

## 2022-06-23 RX ADMIN — DIAZEPAM 5 MG: 5 TABLET ORAL at 20:06

## 2022-06-23 RX ADMIN — SODIUM CHLORIDE, PRESERVATIVE FREE 10 ML: 5 INJECTION INTRAVENOUS at 22:55

## 2022-06-23 RX ADMIN — MORPHINE SULFATE 2 MG: 4 INJECTION INTRAVENOUS at 20:07

## 2022-06-23 RX ADMIN — INSULIN LISPRO 3 UNITS: 100 INJECTION, SOLUTION INTRAVENOUS; SUBCUTANEOUS at 23:43

## 2022-06-23 RX ADMIN — MORPHINE SULFATE 4 MG: 4 INJECTION INTRAVENOUS at 17:07

## 2022-06-23 RX ADMIN — RANOLAZINE 1000 MG: 500 TABLET, FILM COATED, EXTENDED RELEASE ORAL at 23:36

## 2022-06-23 RX ADMIN — ORPHENADRINE CITRATE 30 MG: 30 INJECTION INTRAMUSCULAR; INTRAVENOUS at 17:08

## 2022-06-23 RX ADMIN — DOCUSATE SODIUM 100 MG: 100 CAPSULE ORAL at 23:40

## 2022-06-23 RX ADMIN — BUSPIRONE HYDROCHLORIDE 10 MG: 10 TABLET ORAL at 23:39

## 2022-06-23 ASSESSMENT — PAIN SCALES - GENERAL
PAINLEVEL_OUTOF10: 10

## 2022-06-23 ASSESSMENT — PAIN DESCRIPTION - DESCRIPTORS: DESCRIPTORS: ACHING

## 2022-06-23 ASSESSMENT — PAIN DESCRIPTION - LOCATION
LOCATION: BACK
LOCATION: BACK

## 2022-06-23 NOTE — ED PROVIDER NOTES
[facility]  Emergency Department  Faculty Attestation     I performed a history and physical examination of the patient and discussed management with the resident. I reviewed the residents note and agree with the documented findings and plan of care. Any areas of disagreement are noted on the chart. I was personally present for the key portions of any procedures. I have documented in the chart those procedures where I was not present during the key portions. I have reviewed the emergency nurses triage note. I agree with the chief complaint, past medical history, past surgical history, allergies, medications, social and family history as documented unless otherwise noted below. For Physician Assistant/ Nurse Practitioner cases/documentation I have personally evaluated this patient and have completed at least one if not all key elements of the E/M (history, physical exam, and MDM). Additional findings are as noted. Primary Care Physician:  Anna Chavis, APRN - CNP    Screenings:  [unfilled]    CHIEF COMPLAINT       Chief Complaint   Patient presents with    Back Pain     upper, Zamzam Faes last week       RECENT VITALS:   Temp: 98.1 °F (36.7 °C),  Heart Rate: 84, Resp: 15, BP: (!) 159/111    LABS:  Labs Reviewed   CBC WITH AUTO DIFFERENTIAL   BASIC METABOLIC PANEL W/ REFLEX TO MG FOR LOW K       Radiology  CT THORACIC SPINE WO CONTRAST    (Results Pending)       CRITICAL CARE: There was a high probability of clinically significant/life threatening deterioration in this patient's condition which required my urgent intervention. Total critical care time was none minutes. This excludes any time for separately reportable procedures. EKG:      Attending Physician Additional  Notes    Patient fell on her back several days ago. Since then she been having severe back pain with tenderness to touch and which she believes are spasms.   She initially denied neck pain but she states that she is having increasing numbness in left upper extremity since this morning. She states she always has numbness in this arm due to her AV graft but states that its more numb now since the fall associated with increase in swelling. No change in strength. No facial or left lower extremity symptoms. She states both legs are weak but no different than normal.  She was seen here after her fall during an episode of amnesia likely from dialysis disequilibrium. She apparently did not voice complaints of the fall or back pain at that time. She did go to dialysis today and completed her treatment, back down to her dry weight. No fever chills or sweats. No bowel or bladder incontinence or retention. No saddle anesthesia. No relief of pain with different positions but it is worse with movement. On exam she is tearful, elevated pain score, mildly hypertensive, afebrile, no respiratory distress. There is no midline spine tenderness to my exam.  No pain with axial loading. Normal motor strength. She does have diminished sensation of light touch in the left upper extremity but normal motor strength. She has thrill present in her left AV graft diminished left radial pulses. Capillary refill is slightly delayed on the left. .  She has exquisite well localized dorsal spine tenderness at approximately T8 region. No bruises or skin changes. Lumbar spine nontender. Motor strength in lower extremities is intact. Impression is back contusion rule out fracture, consider right upper extremity neuropathy. Plan is CT cervical and dorsal spine, analgesics, muscle relaxants, screening labs, reassess. Consider further imaging and consultations as needed. With left upper extremity symptoms will discuss with vascular surgery. Inocencio Vázquez.  Vielka Ocampo MD, [address] [facility]  Attending Emergency  Physician               Drew Light MD  06/23/22 [id number]       Drew Light MD  06/23/22 0602

## 2022-06-23 NOTE — ED TRIAGE NOTES
Patient arrived to ED with c/o mid-back pain that has been persisent for a week. Patient reports she fell last week and her back has been hurting since. Patient denies LOC or hitting head. Patient denies numbness and tingling in extremities, but reports dizziness. Patient is AOx4 and displays pain in back. Patient is placed on left side.   Dr. Bhavna Shannon is at bedside for eval.

## 2022-06-24 ENCOUNTER — APPOINTMENT (OUTPATIENT)
Dept: DIALYSIS | Age: 62
DRG: 091 | End: 2022-06-24
Payer: COMMERCIAL

## 2022-06-24 LAB
ANION GAP SERPL CALCULATED.3IONS-SCNC: 13 MMOL/L (ref 9–17)
BUN BLDV-MCNC: 17 MG/DL (ref 8–23)
CALCIUM SERPL-MCNC: 8.7 MG/DL (ref 8.6–10.4)
CHLORIDE BLD-SCNC: 98 MMOL/L (ref 98–107)
CO2: 24 MMOL/L (ref 20–31)
CREAT SERPL-MCNC: 2.27 MG/DL (ref 0.5–0.9)
GFR AFRICAN AMERICAN: 26 ML/MIN
GFR NON-AFRICAN AMERICAN: 22 ML/MIN
GFR SERPL CREATININE-BSD FRML MDRD: ABNORMAL ML/MIN/{1.73_M2}
GLUCOSE BLD-MCNC: 151 MG/DL (ref 65–105)
GLUCOSE BLD-MCNC: 186 MG/DL (ref 65–105)
GLUCOSE BLD-MCNC: 196 MG/DL (ref 65–105)
GLUCOSE BLD-MCNC: 208 MG/DL (ref 70–99)
GLUCOSE BLD-MCNC: 247 MG/DL (ref 65–105)
HCT VFR BLD CALC: 32.2 % (ref 36.3–47.1)
HEMOGLOBIN: 10.3 G/DL (ref 11.9–15.1)
MCH RBC QN AUTO: 31.8 PG (ref 25.2–33.5)
MCHC RBC AUTO-ENTMCNC: 32 G/DL (ref 28.4–34.8)
MCV RBC AUTO: 99.4 FL (ref 82.6–102.9)
NRBC AUTOMATED: 0.6 PER 100 WBC
PDW BLD-RTO: 17.6 % (ref 11.8–14.4)
PLATELET # BLD: 164 K/UL (ref 138–453)
PMV BLD AUTO: 10.9 FL (ref 8.1–13.5)
POTASSIUM SERPL-SCNC: 3.9 MMOL/L (ref 3.7–5.3)
RBC # BLD: 3.24 M/UL (ref 3.95–5.11)
SODIUM BLD-SCNC: 135 MMOL/L (ref 135–144)
WBC # BLD: 4.8 K/UL (ref 3.5–11.3)

## 2022-06-24 PROCEDURE — 6360000002 HC RX W HCPCS: Performed by: EMERGENCY MEDICINE

## 2022-06-24 PROCEDURE — 6370000000 HC RX 637 (ALT 250 FOR IP)

## 2022-06-24 PROCEDURE — 97535 SELF CARE MNGMENT TRAINING: CPT

## 2022-06-24 PROCEDURE — 80048 BASIC METABOLIC PNL TOTAL CA: CPT

## 2022-06-24 PROCEDURE — 90935 HEMODIALYSIS ONE EVALUATION: CPT

## 2022-06-24 PROCEDURE — G0378 HOSPITAL OBSERVATION PER HR: HCPCS

## 2022-06-24 PROCEDURE — 97162 PT EVAL MOD COMPLEX 30 MIN: CPT

## 2022-06-24 PROCEDURE — 97166 OT EVAL MOD COMPLEX 45 MIN: CPT

## 2022-06-24 PROCEDURE — 6370000000 HC RX 637 (ALT 250 FOR IP): Performed by: GENERAL PRACTICE

## 2022-06-24 PROCEDURE — 82947 ASSAY GLUCOSE BLOOD QUANT: CPT

## 2022-06-24 PROCEDURE — 97530 THERAPEUTIC ACTIVITIES: CPT

## 2022-06-24 PROCEDURE — 6370000000 HC RX 637 (ALT 250 FOR IP): Performed by: EMERGENCY MEDICINE

## 2022-06-24 PROCEDURE — 5A1D70Z PERFORMANCE OF URINARY FILTRATION, INTERMITTENT, LESS THAN 6 HOURS PER DAY: ICD-10-PCS | Performed by: INTERNAL MEDICINE

## 2022-06-24 PROCEDURE — 1200000000 HC SEMI PRIVATE

## 2022-06-24 PROCEDURE — 99221 1ST HOSP IP/OBS SF/LOW 40: CPT | Performed by: INTERNAL MEDICINE

## 2022-06-24 PROCEDURE — 2580000003 HC RX 258: Performed by: EMERGENCY MEDICINE

## 2022-06-24 PROCEDURE — 85027 COMPLETE CBC AUTOMATED: CPT

## 2022-06-24 PROCEDURE — 36415 COLL VENOUS BLD VENIPUNCTURE: CPT

## 2022-06-24 PROCEDURE — 6360000002 HC RX W HCPCS: Performed by: INTERNAL MEDICINE

## 2022-06-24 RX ORDER — TIZANIDINE 4 MG/1
4 TABLET ORAL EVERY 6 HOURS PRN
Status: DISCONTINUED | OUTPATIENT
Start: 2022-06-24 | End: 2022-06-25

## 2022-06-24 RX ORDER — SODIUM BICARBONATE 650 MG/1
650 TABLET ORAL 3 TIMES DAILY
Status: DISCONTINUED | OUTPATIENT
Start: 2022-06-24 | End: 2022-07-01 | Stop reason: HOSPADM

## 2022-06-24 RX ORDER — GABAPENTIN 300 MG/1
300 CAPSULE ORAL 2 TIMES DAILY
COMMUNITY
End: 2022-09-08

## 2022-06-24 RX ORDER — ATORVASTATIN CALCIUM 80 MG/1
80 TABLET, FILM COATED ORAL NIGHTLY
Status: DISCONTINUED | OUTPATIENT
Start: 2022-06-24 | End: 2022-07-01 | Stop reason: HOSPADM

## 2022-06-24 RX ORDER — GABAPENTIN 300 MG/1
300 CAPSULE ORAL 2 TIMES DAILY
Status: DISCONTINUED | OUTPATIENT
Start: 2022-06-24 | End: 2022-06-24

## 2022-06-24 RX ORDER — TIZANIDINE 4 MG/1
4 TABLET ORAL EVERY 6 HOURS PRN
COMMUNITY
End: 2022-09-08 | Stop reason: ALTCHOICE

## 2022-06-24 RX ORDER — MORPHINE SULFATE 4 MG/ML
4 INJECTION, SOLUTION INTRAMUSCULAR; INTRAVENOUS EVERY 4 HOURS PRN
Status: DISCONTINUED | OUTPATIENT
Start: 2022-06-24 | End: 2022-06-24

## 2022-06-24 RX ORDER — HEPARIN SODIUM 5000 [USP'U]/ML
5000 INJECTION, SOLUTION INTRAVENOUS; SUBCUTANEOUS 2 TIMES DAILY
Status: DISCONTINUED | OUTPATIENT
Start: 2022-06-25 | End: 2022-07-01 | Stop reason: HOSPADM

## 2022-06-24 RX ORDER — HEPARIN SODIUM 1000 [USP'U]/ML
1900 INJECTION, SOLUTION INTRAVENOUS; SUBCUTANEOUS PRN
Status: DISCONTINUED | OUTPATIENT
Start: 2022-06-24 | End: 2022-07-01 | Stop reason: HOSPADM

## 2022-06-24 RX ORDER — GABAPENTIN 100 MG/1
100 CAPSULE ORAL 2 TIMES DAILY
Status: DISCONTINUED | OUTPATIENT
Start: 2022-06-24 | End: 2022-06-25

## 2022-06-24 RX ADMIN — HEPARIN SODIUM 1900 UNITS: 1000 INJECTION INTRAVENOUS; SUBCUTANEOUS at 12:22

## 2022-06-24 RX ADMIN — DOCUSATE SODIUM 100 MG: 100 CAPSULE ORAL at 08:41

## 2022-06-24 RX ADMIN — SODIUM BICARBONATE 650 MG: 648 TABLET ORAL at 21:35

## 2022-06-24 RX ADMIN — SODIUM CHLORIDE, PRESERVATIVE FREE 10 ML: 5 INJECTION INTRAVENOUS at 21:35

## 2022-06-24 RX ADMIN — RANOLAZINE 1000 MG: 500 TABLET, FILM COATED, EXTENDED RELEASE ORAL at 08:40

## 2022-06-24 RX ADMIN — TAMSULOSIN HYDROCHLORIDE 0.4 MG: 0.4 CAPSULE ORAL at 08:43

## 2022-06-24 RX ADMIN — TIZANIDINE 4 MG: 4 TABLET ORAL at 16:38

## 2022-06-24 RX ADMIN — RANOLAZINE 1000 MG: 500 TABLET, FILM COATED, EXTENDED RELEASE ORAL at 21:35

## 2022-06-24 RX ADMIN — GABAPENTIN 100 MG: 100 CAPSULE ORAL at 21:35

## 2022-06-24 RX ADMIN — ASPIRIN 81 MG: 81 TABLET, CHEWABLE ORAL at 08:45

## 2022-06-24 RX ADMIN — INSULIN LISPRO 3 UNITS: 100 INJECTION, SOLUTION INTRAVENOUS; SUBCUTANEOUS at 08:37

## 2022-06-24 RX ADMIN — ACETAMINOPHEN 650 MG: 325 TABLET ORAL at 16:36

## 2022-06-24 RX ADMIN — TRAZODONE HYDROCHLORIDE 75 MG: 50 TABLET ORAL at 21:37

## 2022-06-24 RX ADMIN — HEPARIN SODIUM 1900 UNITS: 1000 INJECTION INTRAVENOUS; SUBCUTANEOUS at 12:24

## 2022-06-24 RX ADMIN — ATORVASTATIN CALCIUM 80 MG: 80 TABLET, FILM COATED ORAL at 21:34

## 2022-06-24 RX ADMIN — INSULIN LISPRO 3 UNITS: 100 INJECTION, SOLUTION INTRAVENOUS; SUBCUTANEOUS at 18:11

## 2022-06-24 RX ADMIN — BUSPIRONE HYDROCHLORIDE 10 MG: 10 TABLET ORAL at 21:35

## 2022-06-24 RX ADMIN — INSULIN GLARGINE 50 UNITS: 100 INJECTION, SOLUTION SUBCUTANEOUS at 21:48

## 2022-06-24 RX ADMIN — BUSPIRONE HYDROCHLORIDE 10 MG: 10 TABLET ORAL at 08:35

## 2022-06-24 RX ADMIN — DOCUSATE SODIUM 100 MG: 100 CAPSULE ORAL at 21:35

## 2022-06-24 RX ADMIN — BUSPIRONE HYDROCHLORIDE 10 MG: 10 TABLET ORAL at 13:35

## 2022-06-24 RX ADMIN — POTASSIUM BICARBONATE 20 MEQ: 782 TABLET, EFFERVESCENT ORAL at 08:47

## 2022-06-24 RX ADMIN — FUROSEMIDE 80 MG: 40 TABLET ORAL at 08:44

## 2022-06-24 RX ADMIN — SODIUM CHLORIDE, PRESERVATIVE FREE 10 ML: 5 INJECTION INTRAVENOUS at 08:39

## 2022-06-24 RX ADMIN — INSULIN GLARGINE 50 UNITS: 100 INJECTION, SOLUTION SUBCUTANEOUS at 08:36

## 2022-06-24 RX ADMIN — GABAPENTIN 300 MG: 300 CAPSULE ORAL at 08:57

## 2022-06-24 RX ADMIN — INSULIN LISPRO 3 UNITS: 100 INJECTION, SOLUTION INTRAVENOUS; SUBCUTANEOUS at 21:50

## 2022-06-24 RX ADMIN — CARVEDILOL 3.12 MG: 3.12 TABLET, FILM COATED ORAL at 21:35

## 2022-06-24 RX ADMIN — SODIUM BICARBONATE 650 MG: 648 TABLET ORAL at 13:35

## 2022-06-24 RX ADMIN — PANTOPRAZOLE SODIUM 40 MG: 40 TABLET, DELAYED RELEASE ORAL at 08:43

## 2022-06-24 RX ADMIN — CARVEDILOL 3.12 MG: 3.12 TABLET, FILM COATED ORAL at 08:38

## 2022-06-24 RX ADMIN — FUROSEMIDE 80 MG: 40 TABLET ORAL at 18:12

## 2022-06-24 RX ADMIN — ENOXAPARIN SODIUM 40 MG: 100 INJECTION SUBCUTANEOUS at 08:39

## 2022-06-24 RX ADMIN — INSULIN LISPRO 3 UNITS: 100 INJECTION, SOLUTION INTRAVENOUS; SUBCUTANEOUS at 13:36

## 2022-06-24 ASSESSMENT — PAIN SCALES - GENERAL
PAINLEVEL_OUTOF10: 2
PAINLEVEL_OUTOF10: 4
PAINLEVEL_OUTOF10: 0

## 2022-06-24 ASSESSMENT — PAIN DESCRIPTION - LOCATION
LOCATION: NECK;SHOULDER
LOCATION: BACK

## 2022-06-24 ASSESSMENT — PAIN DESCRIPTION - DESCRIPTORS
DESCRIPTORS: ACHING
DESCRIPTORS: ACHING;SHARP

## 2022-06-24 ASSESSMENT — PAIN DESCRIPTION - FREQUENCY: FREQUENCY: INTERMITTENT

## 2022-06-24 ASSESSMENT — PAIN DESCRIPTION - ORIENTATION: ORIENTATION: MID

## 2022-06-24 NOTE — CONSULTS
[address]      Patient's Name/ Date of Birth/ Gender: Flores Leach / 1960 (57 y.o.) / female     Referring Physician: Denise Streeter MD    Consulting Physician: Cindy Zaman    History of present Illness: Pt is a 57 y.o. female who presents to the ED today with back pain and muscle spasm. Of note, she complained of L hand tingling that she says she has had for 4+ months since placement of her L radial to cephalic AV fistula. Fistula was created 12/14/21 with Dr. Crys Arnold. She said she was supposed to follow up with him regarding this but has not made an appointment. She has used the fistula once for dialysis, says it bled so she did not use it again. She was seen by Dr. Ryan Devries for exchange of R IJ tunneled catheter replacement. Vascular surgery consulted tonight for evaluation of L hand tingling. Past Medical History:  has a past medical history of Arthritis, Backache, unspecified, Cerebral artery occlusion with cerebral infarction ([address] CHF (congestive heart failure) ([address] Chronic kidney disease, Coronary atherosclerosis of artery bypass graft, COVID, Cramp of limb, Gallstones, Hemodialysis patient ([address] Hyperlipidemia, Hypertension, Insomnia, Neuromuscular disorder ([address] Pneumonia, Psychiatric problem, Thyroid disease, Type II or unspecified type diabetes mellitus with renal manifestations, not stated as uncontrolled(250.40), Type II or unspecified type diabetes mellitus without mention of complication, not stated as uncontrolled, and Unspecified vitamin D deficiency.     Past Surgical History:   Past Surgical History:   Procedure Laterality Date    ANKLE FRACTURE SURGERY      AV FISTULA CREATION  12/14/2021    BACK SURGERY      BREAST SURGERY      CARDIAC SURGERY      CARPAL TUNNEL RELEASE      CHOLECYSTECTOMY, OPEN N/A     COLONOSCOPY      CORONARY ARTERY BYPASS GRAFT      x3    DIALYSIS FISTULA CREATION Left 12/14/2021    LEFT AV FISTULA Indications: Apply to dialysis site Apply topically as needed.  , Disp: , Rfl:     traZODone (DESYREL) 50 MG tablet, TAKE 1 & 1/2 (ONE & ONE-HALF) TABLETS BY MOUTH NIGHTLY, Disp: 45 tablet, Rfl: 11    acetaminophen (TYLENOL) 325 MG tablet, Take 650 mg by mouth every 6 hours as needed for Pain  (Patient not taking: Reported on 6/20/2022), Disp: , Rfl:     furosemide (LASIX) 80 MG tablet, Take 1 tablet by mouth 2 times daily, Disp: 60 tablet, Rfl: 0    sodium bicarbonate 650 MG tablet, Take 1 tablet by mouth 3 times daily, Disp: 90 tablet, Rfl: 0    Insulin Pen Needle (EASY TOUCH PEN NEEDLES) 29G X 12MM MISC, 1 each by Does not apply route daily, Disp: 100 each, Rfl: 5    ReliOn Lancets Micro-Thin 33G MISC, USE AS DIRECTED EVERY DAY, Disp: , Rfl:     HUMALOG 100 UNIT/ML injection vial, Inject into the skin 3 times daily (before meals) Indications: 15 units and sliding scale (patient reports only the sliding scale) , Disp: , Rfl:     Blood Glucose Monitoring Suppl (TRUE METRIX GO GLUCOSE METER) w/Device KIT, 1 each by Does not apply route 4 times daily, Disp: 1 kit, Rfl: 1    Lancets MISC, 1 each by Does not apply route daily, Disp: 100 each, Rfl: 11    febuxostat (ULORIC) 40 MG TABS tablet, Take 1 tablet by mouth daily, Disp: 30 tablet, Rfl: 0    docusate sodium (COLACE) 100 MG capsule, Take 1 capsule by mouth 2 times daily, Disp: 60 capsule, Rfl: 0    tamsulosin (FLOMAX) 0.4 MG capsule, Take 1 capsule by mouth daily, Disp: 30 capsule, Rfl: 3    atorvastatin (LIPITOR) 80 MG tablet, Take 1 tablet by mouth daily, Disp: 30 tablet, Rfl: 5    aspirin 81 MG chewable tablet, Take 1 tablet by mouth daily, Disp: 30 tablet, Rfl: 0    ranolazine (RANEXA) 1000 MG extended release tablet, Take 1 tablet by mouth 2 times daily, Disp: 60 tablet, Rfl: 0    busPIRone (BUSPAR) 7.5 MG tablet, Take 1 tablet by mouth 3 times daily (Patient taking differently: Take 10 mg by mouth 3 times daily ), Disp: 90 tablet, Rfl: 0   pantoprazole (PROTONIX) 40 MG tablet, Take 1 tablet by mouth every morning (before breakfast), Disp: 30 tablet, Rfl: 0    Vital Signs:  Vitals:    06/23/22 1610   BP: (!) 159/111   Pulse: 84   Resp: 15   Temp: 98.1 °F (36.7 °C)   SpO2: 100%       Physical Exam:  Vital signs and Nurse's note reviewed  Gen:  A&Ox3, acute distress due to back pain  HEENT: PERRLA, EOMI, no scleral icterus, oral mucosa moist  Neck: Supple  Chest/Resp: Symmetric rise with inhalation, no evidence of trauma  CVS: Regular rate and rhythm  Abd: soft, non-tender, obese  Ext: No clubbing, cyanosis, edema, peripheral pulses 2+ Rad/Fem/DP/PT. LUE with palpable thrill to AV fistula.  Sensation intact, strength 5/5 bilaterally   CNS: Moves all extremities, no gross focal motor deficits  Skin: No erythema or ulcerations     Labs:   Lab Results   Component Value Date    WBC 4.3 06/23/2022    HGB 10.9 06/23/2022    HCT 33.4 06/23/2022    MCV 97.1 06/23/2022     06/23/2022     Lab Results   Component Value Date     06/23/2022    K 3.3 06/23/2022    CL 94 06/23/2022    CO2 27 06/23/2022    BUN 15 06/23/2022    CREATININE 1.72 06/23/2022    GLUCOSE 274 06/23/2022    CALCIUM 9.2 06/23/2022     Lab Results   Component Value Date    INR 0.9 11/10/2021       Imaging:  No LUE imaging       Impression:  Patient Active Problem List   Diagnosis    Atherosclerosis of coronary artery bypass graft of native heart without angina pectoris    Chronic diastolic heart failure [facility]    Diabetic polyneuropathy associated with type 2 diabetes mellitus (HCC)    History of coronary artery bypass graft    Iron deficiency anemia    Spinal stenosis of lumbar region with neurogenic claudication    Mixed hyperlipidemia    Type 2 diabetes 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, with long-term current use of insulin ([facility])    Obesity, Class II, BMI 35-39.9    Thyroid nodule greater than or equal to 1 cm in diameter incidentally noted on imaging study    Essential hypertension    Chronic ischemic heart disease    Ischemic stroke of frontal lobe (HCC)    Morbid obesity with BMI of 40.0-44.9, adult (HCC)    Disequilibrium syndrome    Generalized weakness    Long-term memory loss    Muscle right arm weakness    Anxiety    Chronic midline low back pain with bilateral sciatica    AMS (altered mental status)    Tremor    COVID    End-stage renal disease ([facility]    Diabetic ketoacidosis without coma associated with type 2 diabetes mellitus (HCC)    Hypokalemia    Confusion    Myoclonic jerking    Hyperglycemia    Dialysis disequilibrium syndrome    Cerebral hypoperfusion    Muscle spasm    Low back pain       1. Tingling in L hand    Recommendation:    1. Patient with L 2+ radial pulse, 100% spO2 on L hand, and 5/5 motor functions. Unlikely this numbness/tingling is related to vascular steal.   2. Recommend to follow up as outpatient with Dr. Bibiana Mcguire to discuss next steps regarding fistula function  3.  Continue using HD catheter for hemodialysis       Jana Yi DO  8:50 PM  6/23/2022

## 2022-06-24 NOTE — CONSULTS
REASON FOR  NEPHROLOGY CONSULT     Fluid and BP management in ESRD     ACCESS   Tunnel catheter with immature AV fistula    DRY WEIGHT       NEPHROLOGIST   Dr. Khalida Velazquez    [facility] dialysis unit    HISTORY OF PRESENTING ILLNESS               This is a 57 y.o. female with end stage renal disease on hemodialysis Tuesday Thursday Saturday using tunnel catheter had a fall 2 days back and subsequently since then started having paravertebral thoracic muscle spasms. Also complains of left upper extremity tingling sensation without any weakness in other extremities. No reported history of chest pain shortness of breath. Assessment in the ER showed stable vitals. Imaging studies negative for fractures. We have been consulted for dialysis management. She is currently on hemodialysis using tunnel catheter. Has immature left AV fistula.     PAST MEDICAL HISTORY         Diagnosis Date    Arthritis     Backache, unspecified     Cerebral artery occlusion with cerebral infarction ([address]     CHF (congestive heart failure) (HCC)     Chronic kidney disease     Coronary atherosclerosis of artery bypass graft     COVID 1/31/2022    Cramp of limb     Gallstones     Hemodialysis patient ([address]     Hyperlipidemia     Hypertension     Insomnia     Neuromuscular disorder ([address]     Pneumonia     Psychiatric problem     Thyroid disease     Type II or unspecified type diabetes mellitus with renal manifestations, not stated as uncontrolled(250.40)     Type II or unspecified type diabetes mellitus without mention of complication, not stated as uncontrolled     Unspecified vitamin D deficiency          PAST SURGICAL HISTORY         Procedure Laterality Date    ANKLE FRACTURE SURGERY      AV FISTULA CREATION  12/14/2021    BACK SURGERY      BREAST SURGERY      CARDIAC SURGERY      CARPAL TUNNEL RELEASE      CHOLECYSTECTOMY, OPEN N/A     COLONOSCOPY      CORONARY ARTERY BYPASS GRAFT      x3  DIALYSIS FISTULA CREATION Left 12/14/2021    LEFT AV FISTULA CREATION UPPER EXTREMITY performed by Altagracia Santamaria MD at [address]      HAND SURGERY      IR TUNNELED CATHETER PLACEMENT GREATER THAN 5 YEARS  8/18/2021    IR TUNNELED CATHETER PLACEMENT GREATER THAN 5 YEARS 8/18/2021 [facility] SPECIAL PROCEDURES    KNEE ARTHROSCOPY      right    OTHER SURGICAL HISTORY  04/06/2022    cvc exchange    TONSILLECTOMY         MEDICATIONS     Home Meds:                Medications Prior to Admission: gabapentin (NEURONTIN) 300 MG capsule, Take 300 mg by mouth 2 times daily. tiZANidine (ZANAFLEX) 4 MG tablet, Take 4 mg by mouth every 6 hours as needed  carvedilol (COREG) 3.125 MG tablet, Take 1 tablet by mouth 2 times daily  insulin glargine (BASAGLAR KWIKPEN) 100 UNIT/ML injection pen, Inject 50 Units into the skin 2 times daily  miconazole (MICOTIN) 2 % powder, Apply topically 2 times daily. melatonin 10 MG CAPS capsule, Take 10 mg by mouth nightly  lidocaine-prilocaine (EMLA) 2.5-2.5 % cream, Apply topically as needed for Pain Indications: Apply to dialysis site Apply topically as needed.    traZODone (DESYREL) 50 MG tablet, TAKE 1 & 1/2 (ONE & ONE-HALF) TABLETS BY MOUTH NIGHTLY  acetaminophen (TYLENOL) 325 MG tablet, Take 650 mg by mouth every 6 hours as needed for Pain  (Patient not taking: Reported on 6/20/2022)  furosemide (LASIX) 80 MG tablet, Take 1 tablet by mouth 2 times daily  sodium bicarbonate 650 MG tablet, Take 1 tablet by mouth 3 times daily  Insulin Pen Needle (EASY TOUCH PEN NEEDLES) 29G X 12MM MISC, 1 each by Does not apply route daily  ReliOn Lancets Micro-Thin 33G MISC, USE AS DIRECTED EVERY DAY  HUMALOG 100 UNIT/ML injection vial, Inject into the skin 3 times daily (before meals) Indications: 15 units and sliding scale (patient reports only the sliding scale)   Blood Glucose Monitoring Suppl (TRUE METRIX GO GLUCOSE METER) w/Device KIT, 1 each by Does not apply route 4 times daily  Lancets MISC, 1 each by Does not apply route daily  febuxostat (ULORIC) 40 MG TABS tablet, Take 1 tablet by mouth daily  docusate sodium (COLACE) 100 MG capsule, Take 1 capsule by mouth 2 times daily  tamsulosin (FLOMAX) 0.4 MG capsule, Take 1 capsule by mouth daily  atorvastatin (LIPITOR) 80 MG tablet, Take 1 tablet by mouth daily  aspirin 81 MG chewable tablet, Take 1 tablet by mouth daily  ranolazine (RANEXA) 1000 MG extended release tablet, Take 1 tablet by mouth 2 times daily  busPIRone (BUSPAR) 7.5 MG tablet, Take 1 tablet by mouth 3 times daily (Patient taking differently: Take 10 mg by mouth 3 times daily )  pantoprazole (PROTONIX) 40 MG tablet, Take 1 tablet by mouth every morning (before breakfast)  Scheduled Meds:    gabapentin  300 mg Oral BID    sodium bicarbonate  650 mg Oral TID    atorvastatin  80 mg Oral Nightly    aspirin  81 mg Oral Daily    busPIRone  10 mg Oral TID    carvedilol  3.125 mg Oral BID    docusate sodium  100 mg Oral BID    furosemide  80 mg Oral BID    insulin glargine  50 Units SubCUTAneous BID    pantoprazole  40 mg Oral QAM AC    ranolazine  1,000 mg Oral BID    tamsulosin  0.4 mg Oral Daily    traZODone  75 mg Oral Nightly    sodium chloride flush  5-40 mL IntraVENous 2 times per day    enoxaparin  40 mg SubCUTAneous Daily    insulin lispro  0-18 Units SubCUTAneous TID WC    insulin lispro  0-9 Units SubCUTAneous Nightly    potassium bicarb-citric acid  20 mEq Oral Daily     Continuous Infusions:    sodium chloride      dextrose       PRN Meds:  tiZANidine, sodium chloride flush, sodium chloride, acetaminophen, ondansetron **OR** ondansetron, morphine **OR** morphine, glucose, dextrose bolus **OR** dextrose bolus, glucagon (rDNA), dextrose    ALLERGY     Adhesive tape, Ace inhibitors, Iv dye [iodides], Nsaids, and Metformin and related    SOCIAL HISTORY     Social History     Socioeconomic History    Marital status: Single Spouse name: Not on file    Number of children: Not on file    Years of education: Not on file    Highest education level: Not on file   Occupational History    Not on file   Tobacco Use    Smoking status: Never Smoker    Smokeless tobacco: Never Used   Vaping Use    Vaping Use: Never used   Substance and Sexual Activity    Alcohol use: No    Drug use: No    Sexual activity: Not Currently   Other Topics Concern    Not on file   Social History Narrative    Not on file     Social Determinants of Health     Financial Resource Strain: Low Risk     Difficulty of Paying Living Expenses: Not very hard   Food Insecurity: No Food Insecurity    Worried About Running Out of Food in the Last Year: Never true    Nany of Food in the Last Year: Never true   Transportation Needs: No Transportation Needs    Lack of Transportation (Medical): No    Lack of Transportation (Non-Medical): No   Physical Activity: Inactive    Days of Exercise per Week: 0 days    Minutes of Exercise per Session: 0 min   Stress: Stress Concern Present    Feeling of Stress :  To some extent   Social Connections: Socially Isolated    Frequency of Communication with Friends and Family: More than three times a week    Frequency of Social Gatherings with Friends and Family: More than three times a week    Attends [employer] Services: Never    Active Member of Clubs or Organizations: No    Attends Club or Organization Meetings: Never    Marital Status: Never    Intimate Partner Violence:     Fear of Current or Ex-Partner: Not on file    Emotionally Abused: Not on file    Physically Abused: Not on file    Sexually Abused: Not on file   Housing Stability: [address] Unable to Pay for Housing in the Last Year: No    Number of [place] in the Last Year: 1    Unstable Housing in the Last Year: No       FAMILY HISTORY      Family History   Problem Relation Age of Onset    Diabetes Father     Heart Failure Father REVIEW OF SYSTEM      Constitutional: No asthenia/weight loss/anorexia    HEENT : No epistaxis/visual blurriness/rhinorrhoea/sorethroat/trauma  Cardiovascular:No chest pain/palpitation/SOB  Respiratory: No cough/fever/SOB/Wheezing    Gastrointestinal: No abdominal pain/nausea/vomiting/diarrhoea/constipation  Genitourinary: No dysuria/pyuria/hematuria/incomplete emptying of bladder  Musculoskeletal:  No gait disturbance/weakness or joint complaints  Integumentary: No rash or pruritis. Neurological: No headache/diplopia/change in muscle strength/numbness or tingling. No change in gait, balance, coordination, mood, affect, memory, mentation, behavior. Psychiatric: No anxiety/depression. Endocrine: No temperature intolerance. No excessive thirst, fluid intake, or urination. No tremor. Hematologic/Lymphatic: No abnormal bruising or bleeding, blood clots or swolle lymph nodes. Allergic/Immunologic: No nasal congestion or hives    EXAMINATION       Vitals:    06/24/22 1014 06/24/22 1046 06/24/22 1115 06/24/22 1146   BP: (!) 126/50 (!) 116/45 (!) 120/56 (!) 102/43   Pulse: 69 68 70 65   Resp:       Temp:       TempSrc:       SpO2:       Weight:         24HR INTAKE/OUTPUT:  No intake or output data in the 24 hours ending 06/24/22 1210    General appearance:Awake, alert, in no acute distress  Skin: warm and dry, no rash or erythema  Eyes: conjunctivae normal and sclera anicteric  ENT: no thrush no pharyngeal congestion  orodental hygiene   Neck: No JVD, Lymphadenopathty or thyromegaly  Respiratory: vesicular breath sounds,no wheeze/crackles  Cardiovascular: S1 S2 normal,no gallop or organic murmur. No carotid bruit  Abdomen:Non tender/non distended. Bowel sounds present  Extremities: No Cyanosis or Clubbing,Lower extremity edema  Neurological:Alert and oriented. No abnormalities of mood, affect, memory, mentation, or behavior are noted    INVESTIGATIONS     PTH:  No results found for: PTH  abs:   CBC:   Recent Labs 06/23/22  1726 06/24/22  0908   WBC 4.3 4.8   RBC 3.44* 3.24*   HGB 10.9* 10.3*   HCT 33.4* 32.2*   MCV 97.1 99.4   MCH 31.7 31.8   MCHC 32.6 32.0   RDW 17.4* 17.6*    164   MPV 10.9 10.9      BMP:   Recent Labs     06/23/22  1726 06/24/22  0908    135   K 3.3* 3.9   CL 94* 98   CO2 27 24   BUN 15 17   CREATININE 1.72* 2.27*   GLUCOSE 274* 208*   CALCIUM 9.2 8.7        Phosphorus:  No results for input(s): PHOS in the last 72 hours. Magnesium:   Recent Labs     06/23/22  1726   MG 2.0     Albumin: No results for input(s): LABALBU in the last 72 hours. ASSESSMENT     1. ESRD Tuesday Thursday Saturday at [facility] using tunnel catheter and follows up with Dr. Lindsey Lopez.  Has immature left upper extremity fistula also  #2 ParaVertebral thoracic muscle spasms status post fall with imaging studies negative for fractures  #3 CABG with preserved ejection fraction  #4 type 2 diabetes  #5 essential hypertension    PLAN     #1 seen and examined on hemodialysis. Orders reviewed with the nursing staff. #2 resume all home medications  #3 okay to discharge from nephrology standpoint      Thank you for the consultation. Please do not hesitate to call with questions.     This note is created with the assistance of a speech-recognition program. While intending to generate a document that actually reflects the content of the visit, no guarantees can be provided that every mistake has been identified and corrected by editing      Meghana Pierre MD MD, [facility]), [address]   6/24/2022 12:10 PM  [facility]

## 2022-06-24 NOTE — ED PROVIDER NOTES
[facility] ED  Emergency Department Encounter  EmergencyMedicine Resident     Pt Senia Hunt  MRN: [id number]  Deidragfjaylene 1960  Date of evaluation: 6/23/22  PCP:  AFSANEH Sanford [id number]       Chief Complaint   Patient presents with    Back Pain     upper, [facility] last week       HISTORY OF PRESENT ILLNESS  (Location/Symptom, Timing/Onset, Context/Setting, Quality, Duration, Modifying Factors, Severity.)      Dipesh Johnston is a 57 y.o. female who presents with reports of severe mid thoracic muscle spasms and pain for the past several days. Patient states she fell 2 days ago when all of her symptoms began. She states that the pain is worse with movement and is located in the mid thoracic spine with no significant discomfort of the neck or lower back. She did not strike her head or lose consciousness when she fell. Patient also notes that she has had numbness in the left arm for approximately 1 week and a poorly functioning AV fistula for which she would like to see vascular surgery to have evaluated. She feels that her arm numbness may be secondary to the fistula. PAST MEDICAL / SURGICAL / SOCIAL / FAMILY HISTORY      has a past medical history of Arthritis, Backache, unspecified, Cerebral artery occlusion with cerebral infarction ([address] CHF (congestive heart failure) ([address] Chronic kidney disease, Coronary atherosclerosis of artery bypass graft, COVID, Cramp of limb, Gallstones, Hemodialysis patient ([address] Hyperlipidemia, Hypertension, Insomnia, Neuromuscular disorder ([address] Pneumonia, Psychiatric problem, Thyroid disease, Type II or unspecified type diabetes mellitus with renal manifestations, not stated as uncontrolled(250.40), Type II or unspecified type diabetes mellitus without mention of complication, not stated as uncontrolled, and Unspecified vitamin D deficiency.   Denies further past medical hx     has a past surgical history that includes Coronary artery bypass graft; Knee arthroscopy; Carpal tunnel release; Breast surgery; Tonsillectomy; Hand surgery; Ankle fracture surgery; Cholecystectomy, open (N/A); IR TUNNELED CVC PLACE WO SQ PORT/PUMP > 5 YEARS (8/18/2021); AV fistula creation (12/14/2021); Dialysis fistula creation (Left, 12/14/2021); other surgical history (04/06/2022); back surgery; Colonoscopy; eye surgery; fracture surgery; and Cardiac surgery. Denies further past surgical hx    Social History     Socioeconomic History    Marital status: Single     Spouse name: Not on file    Number of children: Not on file    Years of education: Not on file    Highest education level: Not on file   Occupational History    Not on file   Tobacco Use    Smoking status: Never Smoker    Smokeless tobacco: Never Used   Vaping Use    Vaping Use: Never used   Substance and Sexual Activity    Alcohol use: No    Drug use: No    Sexual activity: Not Currently   Other Topics Concern    Not on file   Social History Narrative    Not on file     Social Determinants of Health     Financial Resource Strain: Low Risk     Difficulty of Paying Living Expenses: Not very hard   Food Insecurity: No Food Insecurity    Worried About Running Out of Food in the Last Year: Never true    Nany of Food in the Last Year: Never true   Transportation Needs: No Transportation Needs    Lack of Transportation (Medical): No    Lack of Transportation (Non-Medical): No   Physical Activity: Inactive    Days of Exercise per Week: 0 days    Minutes of Exercise per Session: 0 min   Stress: Stress Concern Present    Feeling of Stress :  To some extent   Social Connections: Socially Isolated    Frequency of Communication with Friends and Family: More than three times a week    Frequency of Social Gatherings with Friends and Family: More than three times a week    Attends Hinduism Services: Never    Active Member of Clubs or Organizations: No    Attends Club or Organization Meetings: Never    Marital Status: Never    Intimate Partner Violence:     Fear of Current or Ex-Partner: Not on file    Emotionally Abused: Not on file    Physically Abused: Not on file    Sexually Abused: Not on file   Housing Stability: [address] Unable to Pay for Housing in the Last Year: No    Number of Places Lived in the Last Year: 1    Unstable Housing in the Last Year: No       Family History   Problem Relation Age of Onset    Diabetes Father     Heart Failure Father        Allergies:  Adhesive tape, Ace inhibitors, Iv dye [iodides], Nsaids, and Metformin and related    Home Medications:  Prior to Admission medications    Medication Sig Start Date End Date Taking? Authorizing Provider   carvedilol (COREG) 3.125 MG tablet Take 1 tablet by mouth 2 times daily 6/15/22 7/15/22  AFSANEH Sanfodr CNP   insulin glargine [facility]) 100 UNIT/ML injection pen Inject 50 Units into the skin 2 times daily 6/11/22   Mirella Triplett MD   miconazole (MICOTIN) 2 % powder Apply topically 2 times daily. 4/20/22   Joella Romberg, MD   melatonin 10 MG CAPS capsule Take 10 mg by mouth nightly    Historical Provider, MD   lidocaine-prilocaine (EMLA) 2.5-2.5 % cream Apply topically as needed for Pain Indications: Apply to dialysis site Apply topically as needed.      Historical Provider, MD   traZODone (DESYREL) 50 MG tablet TAKE 1 & 1/2 (ONE & ONE-HALF) TABLETS BY MOUTH NIGHTLY 4/11/22   AFSANEH Sanford CNP   acetaminophen (TYLENOL) 325 MG tablet Take 650 mg by mouth every 6 hours as needed for Pain   Patient not taking: Reported on 6/20/2022    Historical Provider, MD   furosemide (LASIX) 80 MG tablet Take 1 tablet by mouth 2 times daily 3/8/22   AFSANEH Sanford CNP   sodium bicarbonate 650 MG tablet Take 1 tablet by mouth 3 times daily 3/2/22   [facility], MD   Insulin Pen Needle (EASY TOUCH PEN NEEDLES) 29G X 12MM MISC 1 each by Does not apply route daily 3/1/22   AFSANEH Perez CNP   ReliOn Lancets Micro-Thin 33G MISC USE AS DIRECTED EVERY DAY 1/28/22   Historical Provider, MD   HUMALOG 100 UNIT/ML injection vial Inject into the skin 3 times daily (before meals) Indications: 15 units and sliding scale (patient reports only the sliding scale)  11/29/21   Historical Provider, MD   Blood Glucose Monitoring Suppl (TRUE METRIX GO GLUCOSE METER) w/Device KIT 1 each by Does not apply route 4 times daily 11/30/21   AFSANEH Perez CNP   Lancets MISC 1 each by Does not apply route daily 11/30/21   AFSANEH Perez CNP   febuxostat (ULORIC) 40 MG TABS tablet Take 1 tablet by mouth daily 11/22/21   Dangelo Benedict MD   docusate sodium (COLACE) 100 MG capsule Take 1 capsule by mouth 2 times daily 11/22/21   Dangelo Benedict MD   tamsulosin [facility]) 0.4 MG capsule Take 1 capsule by mouth daily 11/23/21   Dangelo Benedict MD   atorvastatin (LIPITOR) 80 MG tablet Take 1 tablet by mouth daily 11/3/21   AFSANEH Perez CNP   aspirin 81 MG chewable tablet Take 1 tablet by mouth daily 9/25/21   Faviola Calvo MD   ranolazine (RANEXA) 1000 MG extended release tablet Take 1 tablet by mouth 2 times daily 9/25/21   Faviola Calvo MD   busPIRone (BUSPAR) 7.5 MG tablet Take 1 tablet by mouth 3 times daily  Patient taking differently: Take 10 mg by mouth 3 times daily  9/25/21   Faviola Calvo MD   pantoprazole (PROTONIX) 40 MG tablet Take 1 tablet by mouth every morning (before breakfast) 9/25/21   Faviola Calvo MD   allopurinol (ZYLOPRIM) 100 MG tablet Take 1 tablet by mouth daily 4/14/21   AFSANEH Perez CNP       REVIEW OF SYSTEMS    (2-9 systems for level 4, 10 or more for level 5)      Review of Systems    Review of Systems   Constitutional: Negative for chills and fever. HENT: Negative for sore throat. Eyes: Negative for pain. Respiratory: Negative for cough. Cardiovascular: Negative for chest pain and palpitations.    Gastrointestinal: Negative she is experiencing, and she is also very concerned about her AV fistula in the left arm with the diminished pulses that are present. Patient is amenable to staying in the observation unit for IV pain management for the muscle spasms in her back, along with evaluation by vascular surgery for the AV fistula, as well as PT OT evaluation. Patient continues to have significant back pain with movement and is unable to roll onto her back without experiencing spasms. Will admit to obs               PROCEDURES:  None    CONSULTS:  None    CRITICAL CARE:  None    FINAL IMPRESSION      1. Spasm of muscle            DISPOSITION / PLAN     DISPOSITION Admitted 06/23/2022 07:55:55 PM      PATIENT REFERRED TO:  No follow-up provider specified.     DISCHARGE MEDICATIONS:  New Prescriptions    No medications on file       Pamela Alberto DO  Emergency Medicine Resident    (Please note that portions of thisnote were completed with a voice recognition program.  Efforts were made to edit the dictations but occasionally words are mis-transcribed.)        Pamela Alberto DO  Resident  06/23/22 2009

## 2022-06-24 NOTE — PROGRESS NOTES
Th Sat    Subjective   General  Patient assessed for rehabilitation services?: Yes  Family / Caregiver Present: Yes (supportive sister present throughout session)  Pain: pt reports pain in neck-3, non pharmaceutical intervention: increased activity, therapeutic presence  Social/Functional History  Social/Functional History  Lives With:  (Pt lives with Mother who need assist with care, pt  sisters assist at times with baths, meal prep, and as needed)  Type of Home: House  Home Layout: One level  Home Access: Level entry,Ramped entrance  Bathroom Shower/Tub: Walk-in shower  Bathroom Toilet: Standard  Bathroom Equipment: Built-in shower seat,Shower chair,Grab bars in shower (pt has shower seat)  Bathroom Accessibility: Accessible  Home Equipment: [employer], 4 wheeled,Wheelchair-manual,Cane,Hospital bed  Receives Help From: Family  ADL Assistance: Needs assistance (sister assist with care, Hailey and SHANKAR)  Toileting: Independent  Homemaking Assistance: Needs assistance  Homemaking Responsibilities: No  Ambulation Assistance: Independent  Transfer Assistance: Independent  Active : No  Patient's  Info: sisters assist with transportation, med cab to and from dialysis  Mode of Transportation: [employer]  Occupation: Retired  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 pt reports having 02 at home, no longer wears from last admission in [facility] Exceptions: Wears glasses for reading (glasses not in hospital)  Hearing: Within functional limits       Observation/Palpation  Observation: Pt stands with downward resting head position, able to stand with head in neutral posistion and rest with hyper flexed c spin  Safety Devices  Type of Devices: Bed alarm in place;Gait belt;Left in bed;Call light within reach (sister present throughout session)  Restraints  Restraints Initially in Place: No  Bed Mobility Training  Bed Mobility Training: Yes  Overall Level of Assistance: Minimum assistance  Rolling: Minimum assistance  Supine to Sit: Minimum assistance (for BLE progression)  Sit to Supine: Minimum assistance  Scooting: Contact-guard assistance  Balance  Sitting: With support (pt with head in flexion, forward flexed posture)  Standing: With support  Transfer Training  Transfer Training: Yes  Overall Level of Assistance: Minimum assistance;Assist X2  Interventions: Verbal cues  Sit to Stand: Minimum assistance;Assist X2  Stand to Sit: Minimum assistance;Assist X2  Gait  Overall Level of Assistance: Minimum assistance;Assist X2  Assistive Device: Walker, rolling     AROM: Within functional limits  PROM: Within functional limits  Strength: Generally decreased, functional (grossly 4-/5 overall muscle strength)  Coordination: Within functional limits  Tone: Normal  Sensation: Impaired (pt reports numbness since LUE fistula placed)  ADL  Feeding: Independent;Setup  Grooming: Independent;Setup  UE Bathing: Minimal assistance; Increased time to complete;Setup (assist for back)  LE Bathing: Moderate assistance; Increased time to complete;Setup;Verbal cueing (due to decreased functional reach with increased neck pain)  UE Dressing: Minimal assistance; Increased time to complete;Setup (pt able to thread BUE's through arms of gown, assist to tie gown in back)  LE Dressing: Setup;Verbal cueing; Increased time to complete; Moderate assistance (due to decreased functional reach with increased neck pain)  Toileting: Minimal assistance (pt currently using pure wick)     Activity Tolerance  Activity Tolerance: Patient limited by pain  Activity Tolerance Comments: pt tolerated standing x 3 minutes  Bed mobility  Rolling to Right: Minimal assistance  Supine to Sit: Minimal assistance  Sit to Supine: Minimal assistance  Bed Mobility Comments: Activity requried extra time and effort, physical assist with bilateral LE's for progression with bed elevated and B hand rails utilized. pt has a hosp bed at home  Transfers  Sit to stand: 2 Person assistance;Minimal assistance  Stand to sit: 2 Person assistance;Minimal assistance (for safety with good descent)  Vision - Basic Assessment  Prior Vision: No visual deficits  Cognition  Overall Cognitive Status: Exceptions  Arousal/Alertness: Delayed responses to stimuli  Following Commands:  Follows one step commands with increased time  Attention Span: Attends with cues to redirect  Memory: Decreased recall of biographical Information  Safety Judgement: Decreased awareness of need for assistance  Problem Solving: Assistance required to generate solutions  Insights: Decreased awareness of deficits  Initiation: Requires cues for some  Sequencing: Requires cues for some  Cognition Comment: Pt report memory deficits     LUE PROM (degrees)  LUE PROM: WFL  LUE AROM (degrees)  LUE AROM : WFL  Left Hand PROM (degrees)  Left Hand PROM: WFL  Left Hand AROM (degrees)  Left Hand AROM: WFL  RUE PROM (degrees)  RUE PROM: WFL  RUE AROM (degrees)  RUE AROM : WFL  Right Hand PROM (degrees)  Right Hand PROM: WFL  Right Hand AROM (degrees)  Right Hand AROM: WFL     Hand Dominance  Hand Dominance: Right     AM-PAC Score  AM-PAC Inpatient Daily Activity Raw Score: 18 (06/24/22 1651)  AM-PAC Inpatient ADL T-Scale Score : 38.66 (06/24/22 1651)  ADL Inpatient CMS 0-100% Score: 46.65 (06/24/22 1651)  ADL Inpatient CMS G-Code Modifier : CK (06/24/22 1651)    Goals  Short Term Goals  Time Frame for Short term goals: pt will, by discharge:  Short Term Goal 1: Dem Mod I for bed mobility  Short Term Goal 2: Dem cga for functional transfers for daily occupations  Short Term Goal 3: Dem cga for dynamic mobility for household distances  Short Term Goal 4: Dem good safety awareness tech for all transfers/mobility  Short Term Goal 5: Dem cga for adl performance with use of AE as needed       Therapy Time   Individual Concurrent Group Co-treatment   Time In 1434         Time Out 1508         Minutes 34         Timed Code Treatment Minutes: 23 Minutes      Co eval with PT     Sherman Yuan, OTR/L

## 2022-06-24 NOTE — PROGRESS NOTES
Assessment: Patient was awake and oriented when  visited. Family was not present time. Patient remained hopeful and seemed to have confidence in the medical staff. When asked how she was feeling, patient responded; \"better. \" Patient said she was raised [place] [id number] and a member of [facility]. Patient received sacrament of anointing of the sick. Intervention:  provided presence, offered support and prayed with patient. Outcome: Patient was very grateful and thankful for the spiritual support and anointing she received. Plan: Follow up visits recommended for more prayers and support.

## 2022-06-24 NOTE — PROGRESS NOTES
Comprehensive Nutrition Assessment    Type and Reason for Visit:  Initial,Positive Nutrition Screen (Weight loss, poor PO intake)    Nutrition Recommendations/Plan:   1. Recommend carbohydrate controlled diet. Send Nepro shakes twice per day. Malnutrition Assessment:  Malnutrition Status: At risk for malnutrition (Comment) (06/24/22 1430)    Context:  Social/Environmental Circumstances     Findings of the 6 clinical characteristics of malnutrition:  Energy Intake:  No significant decrease in energy intake  Weight Loss:  Greater than 7.5% over 3 months     Body Fat Loss:  No significant body fat loss     Muscle Mass Loss:  No significant muscle mass loss    Fluid Accumulation:  Mild Extremities,Generalized   Strength:  Not Performed    Nutrition Assessment:    Pt reports unintentional weight loss over past two months. States this is likely d/t increased stress at home. Denies decrease in PO intake. Reports drinking Nepro shakes (3x per week) at dialysis appointments. Did not eat much for breakfast (d/t taste preferences), but ate 100% of lunch today. Pt agreeable to Nepro shakes during admission. Labs reviewed. Elevated glucose noted. Nutrition Related Findings:    meds/labs reviewed Wound Type: None       Current Nutrition Intake & Therapies:    Average Meal Intake: % (lunch today)  Average Supplements Intake: None Ordered  ADULT DIET; Regular      Anthropometric Measures:  Height: 5' 5\" (165.1 cm)  Ideal Body Weight (IBW): 125 lbs (57 kg)    Admission Body Weight: 228 lb 9.9 oz (103.7 kg)  Current Body Weight: 225 lb 1.4 oz (102.1 kg), 180.1 % IBW.     Current BMI (kg/m2): 37.5  Usual Body Weight: 251 lb 5.2 oz (114 kg) (4/26/22; fluctuations noted)  % Weight Change (Calculated): -10.4                    BMI Categories: Obese Class 2 (BMI 35.0 -39.9)    Estimated Daily Nutrient Needs:  Energy Requirements Based On: Formula  Weight Used for Energy Requirements: Current  Energy (kcal/day): REX palacios 1. 2-1.3 = 3894-5906 kcal/day  Weight Used for Protein Requirements: Ideal  Protein (g/day): 1.3-1.5 gm/kg = 75-80 gm/day     Fluid (ml/day): per MD    Nutrition Diagnosis:   · Unintended weight loss related to psychological cause or life stress as evidenced by weight loss      Nutrition Interventions:   Food and/or Nutrient Delivery: Modify Current Diet,Start Oral Nutrition Supplement  Nutrition Education/Counseling: No recommendation at this time  Coordination of Nutrition Care: Continue to monitor while inpatient       Goals:  Previous Goal Met:  (goal set)  Goals: PO intake 75% or greater,prior to discharge       Nutrition Monitoring and Evaluation:   Behavioral-Environmental Outcomes: None Identified  Food/Nutrient Intake Outcomes: Food and Nutrient Intake,Supplement Intake  Physical Signs/Symptoms Outcomes: Weight,Biochemical Data,Nutrition Focused Physical Findings,Fluid Status or Edema    Discharge Planning:     Too soon to determine     Juno Benitez MS, RD, LD  Contact: [phone]

## 2022-06-24 NOTE — PROGRESS NOTES
Dialysis Time Out  To be done by RN and tech or 2 RNs  Staff Names; Caldwell Merlin RN    [x]  Identity of the patient using 2 patient identifiers  [x]  Consent for treatment  [x]  Equipment-proper machine and dialyzer  [x]  B-Hep B status  [x]  Orders- to include bath, blood flow, dialyzer, time and fluid removal  [x]  Access-Correct site and in working order  [x]  Time for patient to ask questions.

## 2022-06-24 NOTE — PROGRESS NOTES
Dialysis Post Treatment Note  Vitals:    06/24/22 1220   BP: (!) 130/54   Pulse: 67   Resp: 16   Temp: 97.9 °F (36.6 °C)   SpO2:      Pre-Weight = 103.7kg  Post-weight = Weight: 225 lb 1.4 oz (102.1 kg)  Total Liters Processed = Total Liters Processed (l/min): 55.2 l/min  Rinseback Volume (mL) = Rinseback Volume (ml): 300 ml  Net Removal (mL) =  2700  Patient's dry weight=99.5kg  Type of access used= perm catheter  Length of treatment=150    Patient tolerated treatment well with no complaints

## 2022-06-24 NOTE — H&P
[facility]  CDU / OBSERVATION eNCOUnter  Resident Note     Pt Name: Poitr Wheatley  MRN: [id number]  [place] 1960  Date of evaluation: 6/24/22  Patient's PCP is :  AFSANEH David - CNP    CHIEF COMPLAINT       Chief Complaint   Patient presents with    Back Pain     upper, Isac Rack last week         HISTORY OF PRESENT ILLNESS    Piotr Wheatley is a 57 y.o. female who presents to ED with complaints of severe mid thoracic muscle spasms for the past several days status post fall 2 days ago. Pain worse with movement. Patient also noted she has a poorly functioning AV fistula for which she requested to see vascular surgery and has some numbness to left arm over the last 1 week. Was noted to have diminished radial pulses with palpation of left upper extremity. Was seen by vascular who recommended outpatient follow-up with Dr. Aguila Gambino and continue using the HD catheter for hemodialysis. CT of T-spine nonacute. Placed in observation for PT OT due to difficulty with ADLs. Pt at dialysis this AM due to not completing whole treatment yesterday. Noted some continued L hand numbness that is unchanged since vascular consult in ED. Pt requesting placement. Location/Symptom: Mid thoracic muscle spasm  Timing/Onset: Several days  Provocation: Movement  Quality: Sharp  Radiation: N/A  Severity: Moderate  Timing/Duration: Intermittent  Modifying Factors:  Movement    REVIEW OF SYSTEMS       General ROS - No fevers, No malaise   Ophthalmic ROS - No discharge, No changes in vision  ENT ROS -  No sore throat, No rhinorrhea,   Respiratory ROS - no shortness of breath, no cough, no  wheezing  Cardiovascular ROS - No chest pain, no dyspnea on exertion  Gastrointestinal ROS - No abdominal pain, no nausea or vomiting, no change in bowel habits, no black or bloody stools  Genito-Urinary ROS - No dysuria, trouble voiding, or hematuria  Musculoskeletal ROS - No myalgias, No arthalgias, + back pain  Neurological ROS - No headache, no dizziness/lightheadedness, No focal weakness, + loss of sensation  Dermatological ROS - No lesions, No rash     (PQRS) Advance directives on face sheet per hospital policy. No change unless specifically mentioned in chart    [address]    has a past medical history of Arthritis, Backache, unspecified, Cerebral artery occlusion with cerebral infarction ([address] CHF (congestive heart failure) ([facility] [address] Chronic kidney disease, Coronary atherosclerosis of artery bypass graft, COVID, Cramp of limb, Gallstones, Hemodialysis patient ([address] Hyperlipidemia, Hypertension, Insomnia, Neuromuscular disorder ([address] Pneumonia, Psychiatric problem, Thyroid disease, Type II or unspecified type diabetes mellitus with renal manifestations, not stated as uncontrolled(250.40), Type II or unspecified type diabetes mellitus without mention of complication, not stated as uncontrolled, and Unspecified vitamin D deficiency. I have reviewed the past medical history with the patient and it is pertinent to this complaint. SURGICAL HISTORY      has a past surgical history that includes Coronary artery bypass graft; Knee arthroscopy; Carpal tunnel release; Breast surgery; Tonsillectomy; Hand surgery; Ankle fracture surgery; Cholecystectomy, open (N/A); IR TUNNELED CVC PLACE WO SQ PORT/PUMP > 5 YEARS (8/18/2021); AV fistula creation (12/14/2021); Dialysis fistula creation (Left, 12/14/2021); other surgical history (04/06/2022); back surgery; Colonoscopy; eye surgery; fracture surgery; and Cardiac surgery. I have reviewed and agree with Surgical History entered and it is pertinent to this complaint.      CURRENT MEDICATIONS     aspirin chewable tablet 81 mg, Daily  busPIRone (BUSPAR) tablet 10 mg, TID  carvedilol (COREG) tablet 3.125 mg, BID  docusate sodium (COLACE) capsule 100 mg, BID  furosemide (LASIX) tablet 80 mg, BID  insulin glargine (LANTUS) injection vial 50 Units, BID  pantoprazole (PROTONIX) tablet 40 mg, QAM AC  ranolazine (RANEXA) extended release tablet 1,000 mg, BID  tamsulosin (FLOMAX) capsule 0.4 mg, Daily  traZODone (DESYREL) tablet 75 mg, Nightly  sodium chloride flush 0.9 % injection 5-40 mL, 2 times per day  sodium chloride flush 0.9 % injection 5-40 mL, PRN  0.9 % sodium chloride infusion, PRN  enoxaparin (LOVENOX) injection 40 mg, Daily  acetaminophen (TYLENOL) tablet 650 mg, Q4H PRN  ondansetron (ZOFRAN-ODT) disintegrating tablet 4 mg, Q8H PRN   Or  ondansetron (ZOFRAN) injection 4 mg, Q6H PRN  morphine (PF) injection 2 mg, Q2H PRN   Or  morphine injection 4 mg, Q2H PRN  insulin lispro (HUMALOG) injection vial 0-18 Units, TID WC  insulin lispro (HUMALOG) injection vial 0-9 Units, Nightly  potassium bicarb-citric acid (EFFER-K) effervescent tablet 20 mEq, Daily  glucose chewable tablet 16 g, PRN  dextrose bolus 10% 125 mL, PRN   Or  dextrose bolus 10% 250 mL, PRN  glucagon (rDNA) injection 1 mg, PRN  dextrose 5 % solution, PRN        All medication charted and reviewed. ALLERGIES     is allergic to adhesive tape, ace inhibitors, iv dye [iodides], nsaids, and metformin and related. FAMILY HISTORY     She indicated that the status of her father is unknown.     family history includes Diabetes in her father; Heart Failure in her father. The patient denies any pertinent family history. I have reviewed and agree with the family history entered. I have reviewed the Family History and it is not significant to the case    SOCIAL HISTORY      reports that she has never smoked. She has never used smokeless tobacco. She reports that she does not drink alcohol and does not use drugs. I have reviewed and agree with all Social.  There are no concerns for substance abuse/use. PHYSICAL EXAM     INITIAL VITALS:  oral temperature is 97 °F (36.1 °C). Her blood pressure is 173/65 (abnormal) and her pulse is 72. Her respiration is 18 and oxygen saturation is 100%.       CONSTITUTIONAL: AOx4, no apparent distress, appears stated age    HEAD: normocephalic, atraumatic   EYES: PERRLA, EOMI    ENT: moist mucous membranes, uvula midline   NECK: supple, symmetric   BACK: symmetric   LUNGS: clear to auscultation bilaterally   CARDIOVASCULAR: regular rate and rhythm, no murmurs, rubs or gallops   ABDOMEN: soft, non-tender, non-distended with normal active bowel sounds   NEUROLOGIC:  MAEx4, no focal sensory or motor deficits   MUSCULOSKELETAL: no clubbing, cyanosis or edema, T-spine tenderness, left upper extremity fistula + thrill   SKIN: no rash or wounds, port R chest without surrounding erythema       DIFFERENTIAL DIAGNOSIS/MDM:   Back Pain:  DDX: PE, vertebral fracture, epidural abscess  Lower: cauda equina, herniated disc   Mid: AAA rupture, pyelonephritis, kidney stone, pancreatitis, PUD  Upper: pneumothorax, aortic dissection, PE, nonspecific    DIAGNOSTIC RESULTS   RADIOLOGY:   I directly visualized the following  images and reviewed the radiologist interpretations:    CT CERVICAL SPINE WO CONTRAST    Result Date: 6/23/2022  EXAMINATION: CT OF THE CERVICAL SPINE WITHOUT CONTRAST 6/23/2022 9:28 pm TECHNIQUE: CT of the cervical spine was performed without the administration of intravenous contrast. Multiplanar reformatted images are provided for review. Automated exposure control, iterative reconstruction, and/or weight based adjustment of the mA/kV was utilized to reduce the radiation dose to as low as reasonably achievable. COMPARISON: None. HISTORY: ORDERING SYSTEM PROVIDED HISTORY: neck pain TECHNOLOGIST PROVIDED HISTORY: neck pain Reason for Exam: neck pain FINDINGS: BONES/ALIGNMENT: There is no acute fracture or traumatic malalignment. DEGENERATIVE CHANGES: C2-C3: Unremarkable. C3-C4: Posterior uncovertebral hypertrophy disc bulging. Moderate bilateral facet arthropathy. Mild bilateral neural foraminal. C4-C5: Grade 1 anterolisthesis. Mild bilateral facet arthropathy.   Moderate bilateral neural foraminal narrowing. C5-C6: Changes related to mature instrumented anterior fusion. Moderate bilateral set arthropathy. Severe bilateral neural foraminal narrowing. C6-C7: Changes related to mature instrumented anterior fusion. No canal or foraminal narrowing. C7-T1: Unremarkable. SOFT TISSUES: There is no prevertebral soft tissue swelling. Right central line is partially visualized. No acute abnormality of the cervical spine. No fracture. Changes related to mature instrumented anterior fusion of C5-C6 and C6-C7. At C4-C5, moderate bilateral neural foraminal narrowing. At C5-C6, severe bilateral neural foraminal narrowing. RECOMMENDATIONS: Unavailable     CT THORACIC SPINE WO CONTRAST    Result Date: 6/23/2022  EXAMINATION: CT OF THE THORACIC SPINE WITHOUT CONTRAST  6/23/2022 2:14 pm: TECHNIQUE: CT of the thoracic spine was performed without the administration of intravenous contrast. Multiplanar reformatted images are provided for review. Automated exposure control, iterative reconstruction, and/or weight based adjustment of the mA/kV was utilized to reduce the radiation dose to as low as reasonably achievable. COMPARISON: 04/06/2021 HISTORY: ORDERING SYSTEM PROVIDED HISTORY: fall with midline t spine pain TECHNOLOGIST PROVIDED HISTORY: fall with midline t spine pain Reason for Exam: back pain, fall midline t spine pain FINDINGS: BONES/ALIGNMENT: There is normal alignment of the spine. The vertebral body heights are maintained and the posterior elements appear intact. There is chronic appearing ununited fracture through a bridging osteophyte at the right anterolateral aspect of T9-T10. There is partially visualized anterior cervical discectomy and fusion hardware at C7. No osseous destructive lesion is seen on a background of osseous demineralization. Partially visualized median sternotomy.  DEGENERATIVE CHANGES: There is mild disc height loss throughout the thoracic spine with bridging osteophytes in the mid to lower thoracic levels. Mild multilevel facet arthrosis. Spinal canal appears grossly patent. SOFT TISSUES: No acute abnormality in the paraspinal soft tissues. Heterogeneous multinodular thyroid with a dominant 1.8 cm hypodense nodule in the right lobe, unchanged from prior. Partially imaged right IJ dialysis catheter terminates in the proximal right atrium. Partially visualized changes from CABG. Dependent opacities in the left lung with a trace left pleural effusion. Heavy small-vessel vascular calcifications throughout likely due to underlying renal disease. No acute fracture of the thoracic spine. Dependent opacities in the partially imaged left lung which are likely due to atelectasis, though clinical correlation is required to exclude contributory infection. Trace left pleural effusion. LABS:  I have reviewed and interpreted all available lab results.   Labs Reviewed   CBC WITH AUTO DIFFERENTIAL - Abnormal; Notable for the following components:       Result Value    RBC 3.44 (*)     Hemoglobin 10.9 (*)     Hematocrit 33.4 (*)     RDW 17.4 (*)     Seg Neutrophils 67 (*)     Lymphocytes 18 (*)     Eosinophils % 5 (*)     Immature Granulocytes 1 (*)     Absolute Lymph # 0.77 (*)     All other components within normal limits   BASIC METABOLIC PANEL W/ REFLEX TO MG FOR LOW K - Abnormal; Notable for the following components:    Glucose 274 (*)     CREATININE 1.72 (*)     Potassium 3.3 (*)     Chloride 94 (*)     GFR Non- 30 (*)     GFR  36 (*)     All other components within normal limits   POC GLUCOSE FINGERSTICK - Abnormal; Notable for the following components:    POC Glucose 220 (*)     All other components within normal limits   COVID-19, RAPID   COVID-19, RAPID   MAGNESIUM       CDU IMPRESSION / PLAN      Laurent Hicks is a 57 y.o. female , hx ESRD, who presents with complaints of mid back spasm over the past several days and left upper extremity numbness, AV fistula and requesting vascular consult. Vascular consulted who recommended outpatient follow-up and continue using site for hemodialysis. CT nonacute of T-spine in ED. Was placed in observation unit to see PT, OT due to difficulty with ADLs. Normally gets dialysis Tuesday, Thursday, Saturday. · PT, OT, placement  · Vascular consulted, appreciate [id number] outpatient with Janes Alvarado, continue using site for HD  · Continue home medications and pain control  · Monitor vitals, labs, and imaging  · DISPO: pending consults and clinical improvement    CONSULTS:    IP CONSULT TO VASCULAR SURGERY    PROCEDURES:  Not indicated       PATIENT REFERRED TO:    No follow-up provider specified. --  Gage Orta MD   Emergency Medicine Resident     This dictation was generated by voice recognition computer software. Although all attempts are made to edit the dictation for accuracy, there may be errors in the transcription that are not intended.

## 2022-06-24 NOTE — CARE COORDINATION
Per Dr Dangelo Baez PT is recommending snf and patient is agreeable  Met with patient agreeable gave list   Referrals made to [address] [place] [address] spoke to [facility] nothing till next week but will review  [facility]

## 2022-06-24 NOTE — PROGRESS NOTES
Physical Therapy  Facility/Department: [facility]  Physical Therapy Initial Assessment    Name: Lisa Huber  : 1960  MRN: [id number]  Date of Service: 2022  Chief Complaint   Patient presents with    Back Pain     upper, Karla Vera last week   History per EMR    This is a 57 y.o. female with end stage renal disease on hemodialysis  using tunnel catheter had a fall 2 days back and subsequently since then started having paravertebral thoracic muscle spasms. Also complains of left upper extremity tingling sensation without any weakness in other extremities. Discharge Recommendations: Further therapy recommended at discharge. Patient would benefit from continued therapy after discharge   PT Equipment Recommendations  Equipment Needed: No      Patient Diagnosis(es): The encounter diagnosis was Spasm of muscle. Past Medical History:  has a past medical history of Arthritis, Backache, unspecified, Cerebral artery occlusion with cerebral infarction ([address] CHF (congestive heart failure) ([address] Chronic kidney disease, Coronary atherosclerosis of artery bypass graft, COVID, Cramp of limb, Gallstones, Hemodialysis patient ([address] Hyperlipidemia, Hypertension, Insomnia, Neuromuscular disorder ([address] Pneumonia, Psychiatric problem, Thyroid disease, Type II or unspecified type diabetes mellitus with renal manifestations, not stated as uncontrolled(250.40), Type II or unspecified type diabetes mellitus without mention of complication, not stated as uncontrolled, and Unspecified vitamin D deficiency. Past Surgical History:  has a past surgical history that includes Coronary artery bypass graft; Knee arthroscopy; Carpal tunnel release; Breast surgery; Tonsillectomy; Hand surgery; Ankle fracture surgery; Cholecystectomy, open (N/A); IR TUNNELED CVC PLACE WO SQ PORT/PUMP > 5 YEARS (2021); AV fistula creation (2021);  Dialysis fistula creation (Left, 2021); other surgical history (04/06/2022); back surgery; Colonoscopy; eye surgery; fracture surgery; and Cardiac surgery. Assessment   Body Structures, Functions, Activity Limitations Requiring Skilled Therapeutic Intervention: Decreased functional mobility ; Decreased ADL status; Decreased ROM; Decreased body mechanics; Decreased tolerance to work activity; Decreased strength;Decreased safe awareness;Decreased cognition;Decreased endurance;Decreased sensation;Decreased balance  Assessment: Pt presents with gait, balance and strength deficits and neck/back pain limiting functonal abitly and mobilty. Specific Instructions for Next Treatment: progress activity as indicated and toleratedl  Therapy Prognosis: Guarded  Decision Making: Medium Complexity  Clinical Presentation: progressing  Requires PT Follow-Up: Yes  Activity Tolerance  Activity Tolerance: Patient limited by pain  Activity Tolerance Comments: pt tolerated standing x 3 minutes     Plan   Plan  Plan: 5-7 times per week  Specific Instructions for Next Treatment: progress activity as indicated and toleratedl  Current Treatment Recommendations: Strengthening,ROM,Balance training,Functional mobility training,Transfer training,Endurance training,Gait training,Neuromuscular re-education,Stair training  Safety Devices  Type of Devices: All fall risk precautions in place,Bed alarm in place,Gait belt,Patient at risk for falls,Left in bed,Nurse notified  Restraints  Restraints Initially in Place: No     Restrictions  Restrictions/Precautions  Restrictions/Precautions: Isolation,Contact Precautions,General Precautions,Up as Tolerated,Other (comment) (HD pt with fistula)  Position Activity Restriction  Other position/activity restrictions: Contact isolation, HD pt up as tolerated     Subjective   Pain: 0/10  General  Chart Reviewed: Yes  Patient assessed for rehabilitation services?: Yes  Additional Pertinent Hx:  This is a 57 y.o. female with end stage renal disease on hemodialysis Tuesday Thursday Saturday using tunnel catheter had a fall 2 days back and subsequently since then started having paravertebral thoracic muscle spasms. Also complains of left upper extremity tingling sensation without any weakness in other extremities.   Response To Previous Treatment: Not applicable  Family / Caregiver Present: Yes (sister present during assessment)  Follows Commands: Within Functional Limits  Other (Comment): Pt awake and alert supine in bed, pt in agreement with PT intervention  General Comment  Comments: Okay per RN to see  Subjective  Subjective: Pt complaint of pain in neck, back pain limiting functional mobilty and abilty to stand         Social/Functional History  Social/Functional History  Lives With:  (Pt lives with Mother who need assist with care, pt  sisters assist at times with baths, meal prep, and as needed)  Type of Home: House  Home Layout: One level  Home Access: Level entry,Ramped entrance  Bathroom Shower/Tub: Walk-in shower  Bathroom Toilet: Standard  Bathroom Equipment: Built-in shower seat,Shower chair,Grab bars in shower (pt has shower seat)  Bathroom Accessibility: Accessible  Home Equipment: [employer] Showman, 4 wheeled,Wheelchair-manual,Cane,Hospital bed  Receives Help From: Family  ADL Assistance: Needs assistance (sister assist with care, Hailey and SHANKAR)  Toileting: Independent  Homemaking Assistance: Needs assistance  Homemaking Responsibilities: No  Ambulation Assistance: Independent  Transfer Assistance: Independent  Active : No  Patient's  Info: sister  Mode of Transportation: Car,Bus  Leisure & Hobbies: playing cards with family  Additional Comments: Pt has home O2 at home PRN , currently on 3L, AID 5days/wk 2-4 hrs, 2 hrs on dialysis days assist as needed with all care and household tasks  Vision/Hearing  Vision  Vision: Within Functional Limits  Hearing  Hearing: Within functional limits    Cognition   Orientation  Overall Orientation Status: Within Functional Limits  Orientation Level: Oriented to place;Oriented to time;Oriented to situation  Cognition  Overall Cognitive Status: Exceptions  Arousal/Alertness: Delayed responses to stimuli  Following Commands:  Follows one step commands with increased time  Attention Span: Attends with cues to redirect  Memory: Decreased recall of biographical Information  Safety Judgement: Decreased awareness of need for assistance  Problem Solving: Assistance required to generate solutions  Insights: Decreased awareness of deficits  Initiation: Requires cues for some  Sequencing: Requires cues for some  Cognition Comment: Pt report memory deficits     Objective   Heart Rate: 67  Heart Rate Source: Monitor  BP: (!) 130/54  MAP (Calculated): 79.33  Resp: 16  SpO2: 94 %  O2 Device: Nasal cannula     Observation/Palpation  Observation: Pt stands with downward resting head position, able to stand with head in neutral posistion and rest with hyper flexed c spin  Gross Assessment  AROM: Within functional limits  PROM: Within functional limits  Strength: Generally decreased, functional  Coordination: Within functional limits  Tone: Normal  Sensation: Impaired     AROM RLE (degrees)  RLE AROM: WFL  AROM LLE (degrees)  LLE AROM : WFL  AROM RUE (degrees)  RUE AROM : WFL  AROM LUE (degrees)  LUE AROM : WFL  Strength RLE  Strength RLE: WFL  Strength LLE  Strength LLE: WFL  Strength RUE  Strength RUE: WFL  Strength LUE  Strength LUE: WFL  Strength Other  Other: Pt prevents with general weakness and decondtioning with rigth side LE weaker than left , weakness in cervical para spinals           Bed mobility  Rolling to Left: Minimal assistance  Rolling to Right: Minimal assistance  Supine to Sit: Minimal assistance  Sit to Supine: Minimal assistance  Bed Mobility Comments: Activity requrie extra time and effort, physical assist with bilateral LE's  Transfers  Sit to Stand: Minimal Assistance;2 Person Assistance  Stand to sit: Minimal Assistance;2 Person Assistance  Bed to Chair: Unable to assess (Pt demonstrated decreased stading tolerance with abrupt need to return supine 2/2 pain)  Ambulation  WB Status: FWB  Ambulation  Surface: level tile  Device: Rolling Walker  Other Apparatus: O2  Assistance: Minimal assistance  Quality of Gait: slow guarded gait,  Distance: ~8 ft at side of bed with RW  Comments: Gait limited by fatigue and paraspinal pain  More Ambulation?: No     Balance  Posture: Fair  Sitting - Static: Good  Sitting - Dynamic: Fair  Standing - Static: Fair  Standing - Dynamic: Fair;-  Comments: Pt able to sit unsupported, unable to stand unsupported, use RW with full UE support  Functional Reach Test  Fall Risk (Score of <10 inches is fall risk): Yes                                                           AM-PAC Score     AM-PAC Inpatient Mobility without Stair Climbing Raw Score : 13 (06/24/22 1541)  AM-PAC Inpatient without Stair Climbing T-Scale Score : 38.96 (06/24/22 1541)  Mobility Inpatient CMS 0-100% Score: 58.44 (06/24/22 1541)  Mobility Inpatient without Stair CMS G-Code Modifier : CK (06/24/22 1541)       Goals  Short Term Goals  Time Frame for Short term goals: 5 visits  Short term goal 1: Pt to STS with AD and MOD I  Short term goal 2: Pt to ambulate 25 ft RW CGA  Short term goal 3: Pt to tolerate 30 minutes functional activity and exercises to improve endurance. Long Term Goals  Time Frame for Long term goals : Pt to ambulate 50ft RW SBA  Patient Goals   Patient goals : to be able to ambulate with walker       Education  Patient Education  Education Given To: Patient  Education Provided: Role of Therapy;Plan of Care;Precautions;Transfer Training; Fall Prevention Strategies  Education Provided Comments: Pt educated on fall risk, safety with mobilty and posture  Education Method: Demonstration;Verbal  Barriers to Learning: Cognition  Education Outcome: Verbalized understanding;Continued education needed      Therapy Time   Individual Concurrent Group Co-treatment   Time In 6720         Time Out 1509         Minutes 37         Timed Code Treatment Minutes:  (Co-Tx with OT)       Gonzales Robertson PT, DPT

## 2022-06-24 NOTE — CARE COORDINATION
Case Management Initial Discharge Plan  [address],             Met with:patient to discuss discharge plans. Information verified: address, contacts, phone number, , insurance Yes  Insurance Provider: Jeancarlos Hewitt: No    Emergency Contact/Next of Kin name & number: Guilherme Casas (sisters)  Who are involved in patient's support system? pts mother lives with her    PCP: Anna Chavis, APRN - CNP  Date of last visit: this past wed      Discharge Planning    Living Arrangements:  Parent     Home has 1 stories  no stairs to climb to get into front door, no stairs to climb to reach second floor  Location of bedroom/bathroom in home main    Patient able to perform ADL's:Independent    Current Services (outpatient & in home) DME  DME equipment: walker glucometer, has dialysis at [facility] on [address]  DME provider:     Is patient receiving oral anticoagulation therapy? Yes    If indicated: Nj Parekh  Physician managing anticoagulation treatment: cardiology  Where does patient obtain lab work for ATC treatment? dialysis    Does patient have any issues/concerns obtaining medications? No  If yes, what are patient's concerns? Is there a preferred Pharmacy after hours or on weekends? Yes    If yes, which pharmacy? [facility] on [facility]    Potential Assistance Needed:  N/A    Patient agreeable to home care: Yes  Freedom of choice provided:  Current with Ana    Prior SNF/Rehab Placement and Facility: has used SNF in past cannot recall provider  Agreeable to SNF/Rehab: No  Nadeau of choice provided: n/a     Evaluation: no    Expected Discharge date:       Patient expects to be discharged to:   home    If home: is the family and/or caregiver wiling & able to provide support at home? yes  Who will be providing this support?  mother    Follow Up Appointment: Best Day/ Time: Monday AM    Transportation provider:   Transportation arrangements needed for discharge: No    Readmission Risk              Risk of Unplanned Readmission:  0             Does patient have a readmission risk score greater than 14?: No  If yes, follow-up appointment must be made within 7 days of discharge.      Goals of Care: pain control    Educated pt on transitional options, provided freedom of choice and are agreeable with plan      Discharge Plan: goal is home, mother lives with pt, has dialysis at [facility] on [address], current with Candido Simmons, Has transportation          Electronically signed by Shannon Jones RN on 6/24/22 at 9:22 AM EDT

## 2022-06-24 NOTE — FLOWSHEET NOTE
01/05/22 1434   Encounter Summary   Services provided to: Patient   Referral/Consult From: Nhi   Continue Visiting   (1-5-22 V)   Volunteer Visit Yes   Complexity of Encounter Low   Length of Encounter 15 minutes   Spiritual/Mormonism   Type Spiritual support   Intervention Prayer;Communion   Sacraments   Communion Patient received communion .

## 2022-06-25 ENCOUNTER — APPOINTMENT (OUTPATIENT)
Dept: MRI IMAGING | Age: 62
DRG: 091 | End: 2022-06-25
Payer: COMMERCIAL

## 2022-06-25 ENCOUNTER — APPOINTMENT (OUTPATIENT)
Dept: DIALYSIS | Age: 62
DRG: 091 | End: 2022-06-25
Payer: COMMERCIAL

## 2022-06-25 ENCOUNTER — APPOINTMENT (OUTPATIENT)
Dept: CT IMAGING | Age: 62
DRG: 091 | End: 2022-06-25
Payer: COMMERCIAL

## 2022-06-25 PROBLEM — R41.82 ALTERED MENTAL STATUS: Status: ACTIVE | Noted: 2022-06-25

## 2022-06-25 LAB
ABSOLUTE EOS #: 0.13 K/UL (ref 0–0.44)
ABSOLUTE IMMATURE GRANULOCYTE: 0.03 K/UL (ref 0–0.3)
ABSOLUTE LYMPH #: 0.98 K/UL (ref 1.1–3.7)
ABSOLUTE MONO #: 0.57 K/UL (ref 0.1–1.2)
ALBUMIN SERPL-MCNC: 3.2 G/DL (ref 3.5–5.2)
ALBUMIN/GLOBULIN RATIO: 0.9 (ref 1–2.5)
ALP BLD-CCNC: 88 U/L (ref 35–104)
ALT SERPL-CCNC: 12 U/L (ref 5–33)
AMMONIA: 38 UMOL/L (ref 11–51)
ANION GAP SERPL CALCULATED.3IONS-SCNC: 12 MMOL/L (ref 9–17)
AST SERPL-CCNC: 23 U/L
BASOPHILS # BLD: 1 % (ref 0–2)
BASOPHILS ABSOLUTE: 0.03 K/UL (ref 0–0.2)
BILIRUB SERPL-MCNC: 0.97 MG/DL (ref 0.3–1.2)
BUN BLDV-MCNC: 12 MG/DL (ref 8–23)
CALCIUM SERPL-MCNC: 8.1 MG/DL (ref 8.6–10.4)
CHLORIDE BLD-SCNC: 98 MMOL/L (ref 98–107)
CO2: 25 MMOL/L (ref 20–31)
CREAT SERPL-MCNC: 2.13 MG/DL (ref 0.5–0.9)
EOSINOPHILS RELATIVE PERCENT: 2 % (ref 1–4)
GFR AFRICAN AMERICAN: 28 ML/MIN
GFR NON-AFRICAN AMERICAN: 23 ML/MIN
GFR SERPL CREATININE-BSD FRML MDRD: ABNORMAL ML/MIN/{1.73_M2}
GLUCOSE BLD-MCNC: 143 MG/DL (ref 65–105)
GLUCOSE BLD-MCNC: 154 MG/DL (ref 65–105)
GLUCOSE BLD-MCNC: 171 MG/DL (ref 70–99)
GLUCOSE BLD-MCNC: 267 MG/DL (ref 65–105)
GLUCOSE BLD-MCNC: 322 MG/DL (ref 65–105)
HCT VFR BLD CALC: 33 % (ref 36.3–47.1)
HEMOGLOBIN: 11.2 G/DL (ref 11.9–15.1)
IMMATURE GRANULOCYTES: 1 %
LYMPHOCYTES # BLD: 17 % (ref 24–43)
MCH RBC QN AUTO: 32.6 PG (ref 25.2–33.5)
MCHC RBC AUTO-ENTMCNC: 33.9 G/DL (ref 28.4–34.8)
MCV RBC AUTO: 95.9 FL (ref 82.6–102.9)
MONOCYTES # BLD: 10 % (ref 3–12)
NRBC AUTOMATED: 0.4 PER 100 WBC
PDW BLD-RTO: 18 % (ref 11.8–14.4)
PLATELET # BLD: 134 K/UL (ref 138–453)
PMV BLD AUTO: 10.8 FL (ref 8.1–13.5)
POTASSIUM SERPL-SCNC: 3.4 MMOL/L (ref 3.7–5.3)
RBC # BLD: 3.44 M/UL (ref 3.95–5.11)
RBC # BLD: ABNORMAL 10*6/UL
SEG NEUTROPHILS: 69 % (ref 36–65)
SEGMENTED NEUTROPHILS ABSOLUTE COUNT: 3.9 K/UL (ref 1.5–8.1)
SODIUM BLD-SCNC: 135 MMOL/L (ref 135–144)
TOTAL PROTEIN: 6.6 G/DL (ref 6.4–8.3)
WBC # BLD: 5.6 K/UL (ref 3.5–11.3)

## 2022-06-25 PROCEDURE — 6360000002 HC RX W HCPCS

## 2022-06-25 PROCEDURE — 99222 1ST HOSP IP/OBS MODERATE 55: CPT | Performed by: PSYCHIATRY & NEUROLOGY

## 2022-06-25 PROCEDURE — 6370000000 HC RX 637 (ALT 250 FOR IP): Performed by: EMERGENCY MEDICINE

## 2022-06-25 PROCEDURE — 99231 SBSQ HOSP IP/OBS SF/LOW 25: CPT | Performed by: INTERNAL MEDICINE

## 2022-06-25 PROCEDURE — 6360000002 HC RX W HCPCS: Performed by: INTERNAL MEDICINE

## 2022-06-25 PROCEDURE — 70450 CT HEAD/BRAIN W/O DYE: CPT

## 2022-06-25 PROCEDURE — 95816 EEG AWAKE AND DROWSY: CPT

## 2022-06-25 PROCEDURE — 6370000000 HC RX 637 (ALT 250 FOR IP): Performed by: GENERAL PRACTICE

## 2022-06-25 PROCEDURE — 95816 EEG AWAKE AND DROWSY: CPT | Performed by: PSYCHIATRY & NEUROLOGY

## 2022-06-25 PROCEDURE — 82947 ASSAY GLUCOSE BLOOD QUANT: CPT

## 2022-06-25 PROCEDURE — 80053 COMPREHEN METABOLIC PANEL: CPT

## 2022-06-25 PROCEDURE — 6370000000 HC RX 637 (ALT 250 FOR IP)

## 2022-06-25 PROCEDURE — 99223 1ST HOSP IP/OBS HIGH 75: CPT | Performed by: PSYCHIATRY & NEUROLOGY

## 2022-06-25 PROCEDURE — 6370000000 HC RX 637 (ALT 250 FOR IP): Performed by: STUDENT IN AN ORGANIZED HEALTH CARE EDUCATION/TRAINING PROGRAM

## 2022-06-25 PROCEDURE — 6370000000 HC RX 637 (ALT 250 FOR IP): Performed by: PSYCHIATRY & NEUROLOGY

## 2022-06-25 PROCEDURE — 82140 ASSAY OF AMMONIA: CPT

## 2022-06-25 PROCEDURE — 70544 MR ANGIOGRAPHY HEAD W/O DYE: CPT

## 2022-06-25 PROCEDURE — 2580000003 HC RX 258: Performed by: EMERGENCY MEDICINE

## 2022-06-25 PROCEDURE — 70547 MR ANGIOGRAPHY NECK W/O DYE: CPT

## 2022-06-25 PROCEDURE — 36415 COLL VENOUS BLD VENIPUNCTURE: CPT

## 2022-06-25 PROCEDURE — 90935 HEMODIALYSIS ONE EVALUATION: CPT

## 2022-06-25 PROCEDURE — 83036 HEMOGLOBIN GLYCOSYLATED A1C: CPT

## 2022-06-25 PROCEDURE — 6360000002 HC RX W HCPCS: Performed by: EMERGENCY MEDICINE

## 2022-06-25 PROCEDURE — 70551 MRI BRAIN STEM W/O DYE: CPT

## 2022-06-25 PROCEDURE — 85025 COMPLETE CBC W/AUTO DIFF WBC: CPT

## 2022-06-25 PROCEDURE — 1200000000 HC SEMI PRIVATE

## 2022-06-25 RX ORDER — DICYCLOMINE HYDROCHLORIDE 10 MG/ML
20 INJECTION INTRAMUSCULAR ONCE
Status: COMPLETED | OUTPATIENT
Start: 2022-06-25 | End: 2022-06-25

## 2022-06-25 RX ORDER — POTASSIUM CHLORIDE 20 MEQ/1
40 TABLET, EXTENDED RELEASE ORAL ONCE
Status: COMPLETED | OUTPATIENT
Start: 2022-06-25 | End: 2022-06-25

## 2022-06-25 RX ORDER — CYCLOBENZAPRINE HCL 10 MG
5 TABLET ORAL 2 TIMES DAILY PRN
Status: DISCONTINUED | OUTPATIENT
Start: 2022-06-25 | End: 2022-06-26

## 2022-06-25 RX ORDER — TRAZODONE HYDROCHLORIDE 50 MG/1
50 TABLET ORAL NIGHTLY
Status: DISCONTINUED | OUTPATIENT
Start: 2022-06-25 | End: 2022-06-27

## 2022-06-25 RX ORDER — UREA 10 %
3 LOTION (ML) TOPICAL ONCE
Status: COMPLETED | OUTPATIENT
Start: 2022-06-25 | End: 2022-06-25

## 2022-06-25 RX ORDER — MIDODRINE HYDROCHLORIDE 5 MG/1
5 TABLET ORAL
Status: ACTIVE | OUTPATIENT
Start: 2022-06-25 | End: 2022-06-25

## 2022-06-25 RX ADMIN — SODIUM BICARBONATE 650 MG: 648 TABLET ORAL at 22:18

## 2022-06-25 RX ADMIN — INSULIN GLARGINE 50 UNITS: 100 INJECTION, SOLUTION SUBCUTANEOUS at 21:30

## 2022-06-25 RX ADMIN — HEPARIN SODIUM 1900 UNITS: 1000 INJECTION INTRAVENOUS; SUBCUTANEOUS at 13:17

## 2022-06-25 RX ADMIN — CARVEDILOL 3.12 MG: 3.12 TABLET, FILM COATED ORAL at 22:18

## 2022-06-25 RX ADMIN — Medication 3 MG: at 23:00

## 2022-06-25 RX ADMIN — ATORVASTATIN CALCIUM 80 MG: 80 TABLET, FILM COATED ORAL at 22:18

## 2022-06-25 RX ADMIN — DICYCLOMINE HYDROCHLORIDE 20 MG: 20 INJECTION INTRAMUSCULAR at 22:24

## 2022-06-25 RX ADMIN — POTASSIUM CHLORIDE 40 MEQ: 1500 TABLET, EXTENDED RELEASE ORAL at 22:19

## 2022-06-25 RX ADMIN — RANOLAZINE 1000 MG: 500 TABLET, FILM COATED, EXTENDED RELEASE ORAL at 22:17

## 2022-06-25 RX ADMIN — MORPHINE SULFATE 4 MG: 4 INJECTION INTRAVENOUS at 22:19

## 2022-06-25 RX ADMIN — INSULIN LISPRO 12 UNITS: 100 INJECTION, SOLUTION INTRAVENOUS; SUBCUTANEOUS at 08:37

## 2022-06-25 RX ADMIN — BUSPIRONE HYDROCHLORIDE 10 MG: 10 TABLET ORAL at 22:18

## 2022-06-25 RX ADMIN — SODIUM CHLORIDE, PRESERVATIVE FREE 10 ML: 5 INJECTION INTRAVENOUS at 22:19

## 2022-06-25 RX ADMIN — TRAZODONE HYDROCHLORIDE 50 MG: 50 TABLET ORAL at 21:30

## 2022-06-25 RX ADMIN — CYCLOBENZAPRINE 5 MG: 10 TABLET, FILM COATED ORAL at 17:08

## 2022-06-25 RX ADMIN — INSULIN GLARGINE 50 UNITS: 100 INJECTION, SOLUTION SUBCUTANEOUS at 08:37

## 2022-06-25 RX ADMIN — HEPARIN SODIUM 5000 UNITS: 5000 INJECTION INTRAVENOUS; SUBCUTANEOUS at 21:30

## 2022-06-25 ASSESSMENT — PAIN DESCRIPTION - ORIENTATION: ORIENTATION: LOWER

## 2022-06-25 ASSESSMENT — PAIN DESCRIPTION - LOCATION
LOCATION: BACK
LOCATION: BACK

## 2022-06-25 ASSESSMENT — PAIN DESCRIPTION - DESCRIPTORS
DESCRIPTORS: ACHING
DESCRIPTORS: ACHING

## 2022-06-25 ASSESSMENT — PAIN DESCRIPTION - PAIN TYPE: TYPE: CHRONIC PAIN

## 2022-06-25 ASSESSMENT — PAIN SCALES - GENERAL
PAINLEVEL_OUTOF10: 10
PAINLEVEL_OUTOF10: 4
PAINLEVEL_OUTOF10: 10
PAINLEVEL_OUTOF10: 10

## 2022-06-25 ASSESSMENT — PAIN - FUNCTIONAL ASSESSMENT: PAIN_FUNCTIONAL_ASSESSMENT: PREVENTS OR INTERFERES SOME ACTIVE ACTIVITIES AND ADLS

## 2022-06-25 NOTE — CONSULTS
History:   Diagnosis Date    Arthritis     Backache, unspecified     [facility] occlusion [facility] infarction [facility]     CHF (congestive heart failure) (HCC)     Chronic kidney disease     Coronary atherosclerosis of artery bypass graft     COVID 1/31/2022    Cramp of limb     Gallstones     Hemodialysis patient ([facility]     Hyperlipidemia     Hypertension     Insomnia     Neuromuscular disorder ([facility]     Pneumonia     Psychiatric problem     Thyroid disease     Type II or unspecified type diabetes mellitus with renal manifestations, not stated as uncontrolled(250.40)     Type II or unspecified type diabetes mellitus without mention of complication, not stated as uncontrolled     Unspecified vitamin D deficiency         Past Surgical History:     Past Surgical History:   Procedure Laterality Date    ANKLE FRACTURE SURGERY      AV FISTULA CREATION  12/14/2021    BACK SURGERY      BREAST SURGERY      CARDIAC SURGERY      CARPAL TUNNEL RELEASE      CHOLECYSTECTOMY, OPEN N/A     COLONOSCOPY      CORONARY ARTERY BYPASS GRAFT      x3    DIALYSIS FISTULA CREATION Left 12/14/2021    LEFT AV FISTULA CREATION UPPER EXTREMITY performed by Isaac Turner MD at [facility]      HAND SURGERY      IR TUNNELED CATHETER PLACEMENT GREATER THAN 5 YEARS  8/18/2021    IR TUNNELED CATHETER PLACEMENT GREATER THAN 5 YEARS 8/18/2021 [facility] PROCED[facility]    KNEE ARTHROSCOPY      right    OTHER SURGICAL HISTORY  04/06/2022    cvc exchange    TONSILLECTOMY          Medications Prior to Admission:     Prior to Admission medications    Medication Sig Start Date End Date Taking? Authorizing Provider   gabapentin (NEURONTIN) 300 MG capsule Take 300 mg by mouth 2 times daily.    Yes Historical Provider, MD   tiZANidine (ZANAFLEX) 4 MG tablet Take 4 mg by mouth every 6 hours as needed   Yes Historical Provider, MD   carvedilol (COREG) 3.125 MG tablet Take 1 tablet by mouth 2 times daily 6/15/22 7/15/22  AFSANEH King CNP   insulin glargine [facility]) 100 UNIT/ML injection pen Inject 50 Units into the skin 2 times daily 6/11/22   Mi Treviño MD   miconazole (MICOTIN) 2 % powder Apply topically 2 times daily. 4/20/22   Soumya Horn MD   melatonin 10 MG CAPS capsule Take 10 mg by mouth nightly    Historical Provider, MD   lidocaine-prilocaine (EMLA) 2.5-2.5 % cream Apply topically as needed for Pain Indications: Apply to dialysis site Apply topically as needed.      Historical Provider, MD   traZODone (DESYREL) 50 MG tablet TAKE 1 & 1/2 (ONE & ONE-HALF) TABLETS BY MOUTH NIGHTLY 4/11/22   AFSANEH King CNP   acetaminophen (TYLENOL) 325 MG tablet Take 650 mg by mouth every 6 hours as needed for Pain   Patient not taking: Reported on 6/20/2022    Historical Provider, MD   furosemide (LASIX) 80 MG tablet Take 1 tablet by mouth 2 times daily 3/8/22   AFSANEH King CNP   sodium bicarbonate 650 MG tablet Take 1 tablet by mouth 3 times daily 3/2/22   [facility], MD   Insulin Pen Needle (EASY TOUCH PEN NEEDLES) 29G X 12MM MISC 1 each by Does not apply route daily 3/1/22   AFSANEH King CNP   ReliOn Lancets Micro-Thin 33G MISC USE AS DIRECTED EVERY DAY 1/28/22   Historical Provider, MD   HUMALOG 100 UNIT/ML injection vial Inject into the skin 3 times daily (before meals) Indications: 15 units and sliding scale (patient reports only the sliding scale)  11/29/21   Historical Provider, MD   Blood Glucose Monitoring Suppl (TRUE METRIX GO GLUCOSE METER) w/Device KIT 1 each by Does not apply route 4 times daily 11/30/21   AFSANEH King CNP   Lancets MISC 1 each by Does not apply route daily 11/30/21   AFSANEH King CNP   febuxostat (ULORIC) 40 MG TABS tablet Take 1 tablet by mouth daily 11/22/21   Michael Kovacs MD   docusate sodium (COLACE) 100 MG capsule Take 1 capsule by mouth 2 times daily 11/22/21   [employer] [employer] Kathleen Villareal MD   tamsulosin [facility]) 0.4 MG capsule Take 1 capsule by mouth daily 11/23/21   Cristian Sibley MD   atorvastatin (LIPITOR) 80 MG tablet Take 1 tablet by mouth daily 11/3/21   AFSANEH Barrera CNP   aspirin 81 MG chewable tablet Take 1 tablet by mouth daily 9/25/21   Helen Kelsey MD   ranolazine (RANEXA) 1000 MG extended release tablet Take 1 tablet by mouth 2 times daily 9/25/21   Helen Kelsey MD   busPIRone (BUSPAR) 7.5 MG tablet Take 1 tablet by mouth 3 times daily  Patient taking differently: Take 10 mg by mouth 3 times daily  9/25/21   Helen Kelsey MD   pantoprazole (PROTONIX) 40 MG tablet Take 1 tablet by mouth every morning (before breakfast) 9/25/21   Helen Kelsey MD   allopurinol (ZYLOPRIM) 100 MG tablet Take 1 tablet by mouth daily 4/14/21   AFSANEH Barrera CNP        Allergies:     Adhesive tape, Ace inhibitors, Iv dye [iodides], Nsaids, and Metformin and related    Social History:     Tobacco:    reports that she has never smoked. She has never used smokeless tobacco.  Alcohol:      reports no history of alcohol use. Drug Use:  reports no history of drug use.     Family History:     Family History   Problem Relation Age of Onset    Diabetes Father     Heart Failure Father        Review of Systems:       Constitutional Negative for fever and chills   HEENT Negative for ear discharge, ear pain, nosebleed   Eyes Negative for photophobia, pain and discharge   Respiratory Negative for hemoptysis and sputum   Cardiovascular Negative for orthopnea, claudication and PND   Gastrointestinal Negative for abdominal pain, diarrhea, blood in stool   Musculoskeletal Negative for joint pain, negative for myalgia   Skin Negative for rash or itching   hematology Negative for ecchymosis, anemia   Psychiatric Negative for suicidal ideation, anxiety, depression, hallucinations       Physical Exam:   /60   Pulse 76   Temp 97.5 °F (36.4 °C)   Resp 14   Ht 5' 5\" (1.651 m)   Wt 217 lb 13 oz (98.8 kg)   SpO2 99%   BMI 36.25 kg/m²   Temp (24hrs), Av.3 °F (36.8 °C), Min:97.5 °F (36.4 °C), Max:98.9 °F (37.2 °C)        General examination:      General Appearance:  alert, well appearing, and in no acute distress  HEENT: Normocephalic, atraumatic, moist mucus membranes  Neck: supple, no carotid bruits, (-) nuchal rigidity  Lungs:  Respirations unlabored, chest wall no deformity, BS normal  Cardiovascular: normal rate, regular rhythm  Abdomen: Soft, nontender, nondistended, normal bowel sounds  Skin: No gross lesions, rashes, bruising or bleeding on exposed skin area  Extremities:  peripheral pulses palpable, clubbing or edema  Psych: normal affect    NEUROLOGIC EXAMINATION    Mental status   Alert and oriented x 2; following all commands;   speech is fluent, no dysarthria, aphasia. Cranial nerves   II - visual fields intact to confrontation; pupils reactive  III, IV, VI - extraocular muscles intact; no CECELIA; no nystagmus; no ptosis   V - normal facial sensation                                                               VII - normal facial symmetry                                                             VIII - intact hearing                                                                             IX, X - symmetrical palate elevation                                               XI - symmetrical shoulder shrug                                                       XII - midline tongue without atrophy or fasciculation     Motor function  Strength:   1/5 RUE, 2+/5 RLE  5/5 LUE, 5/5  LLE  Normal bulk and tone. Sensory function  decreased on the right side     Cerebellar Intact finger-nose-finger testing. Intact heel-shin testing. No dysdiadochokinesia present. No tremors                        Reflex function 2/4 symmetric throughout . Downgoing plantar response bilaterally.    (-)Meza's sign bilaterally      Gait                  Deferred         NIH STROKE SCALE:    Time Performed: 2:55 PM     1a. Level of consciousness:  0 - alert; keenly responsive  1b. Level of consciousness questions:  0 - answers both questions correctly  1c. Level of consciousness questions:  0 - performs both tasks correctly  2. Best Gaze:  0 - normal  3. Visual:  0 - no visual loss  4. Facial Palsy:  0 - normal symmetric movement  5a. Motor left arm:  0 - no drift, limb holds 90 (or 45) degrees for full 10 seconds  5b. Motor right arm:  2 - some effort against gravity, limb cannot get to or maintain (if cued) 90 (or 45) degrees, drifts down to bed, but has some effort against gravity   6a. Motor left le - drift; leg falls by the end of the 5 second period but does not hit bed  6b. Motor right leg:  3 - no effort against gravity; leg falls to bed immediately  7. Limb Ataxia:  0 - absent  8. Sensory:  1 - mild to moderate sensory loss; patient feels pinprick is less sharp or is dull on the affected side; there is a loss of superficial pain with pinprick but patient is aware of being touched   9. Best Language:  0 - no aphasia, normal  10. Dysarthria:  0 - normal  11.   Extinction and Inattention:  1 - visual, tactile, auditory, spatial or personal inattention or extinction to bilateral simultaneous stimulation in one of the sensory modalities     TOTAL:  8      Modified Aylin Score Scale:      [] Zero: No symptoms at all   [] 1: No significant disability despite symptoms; able to carry out all usual duties and activities   [x] 2: Slight disability; unable to carry out all previous activities, but able to look after own affairs without assistance   [] 3:Moderate disability; requiring some help, but able to walk without assistance   [] 4: Moderately severe disability; unable to walk and attend to bodily needs without assistance   [] 5:Severe disability; bedridden, incontinent and requiring constant nursing care and attention      Diagnostics:      Laboratory Testing:    CBC: Recent Labs     06/23/22  1726 06/24/22  0908   WBC 4.3 4.8   HGB 10.9* 10.3*    164       BMP:    Recent Labs     06/23/22  1726 06/24/22  0908    135   K 3.3* 3.9   CL 94* 98   CO2 27 24   BUN 15 17   CREATININE 1.72* 2.27*   GLUCOSE 274* 208*         Lab Results   Component Value Date    CHOL 105 04/09/2015    LDLCHOLESTEROL 72 12/26/2012    HDL 43 04/09/2015    TRIG 168 04/09/2015    ALT 14 06/18/2022    AST 22 06/18/2022    TSH 0.49 06/18/2022    INR 0.9 11/10/2021    LABA1C 11.3 (H) 04/05/2022    LABMICR 538 12/26/2012    NQSPPEPF40 459 12/26/2012           Imaging/Diagnostics:    MRI brain without contrast  MRA head and neck without contrast    Assessment and plan:       Patient Active Problem List   Diagnosis    Atherosclerosis of coronary artery bypass graft of native heart without angina pectoris    Chronic diastolic heart failure ([address]    Diabetic polyneuropathy associated with type 2 diabetes mellitus ([address]    History of coronary artery bypass graft    Iron deficiency anemia    Spinal stenosis of lumbar region with neurogenic claudication    Mixed hyperlipidemia    Type 2 diabetes 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, with long-term current use of insulin ([facility])    Obesity, Class II, BMI 35-39.9    Thyroid nodule greater than or equal to 1 cm in diameter incidentally noted on imaging study    Essential hypertension    Chronic ischemic heart disease    Ischemic stroke of frontal lobe (HCC)    Morbid obesity with BMI of 40.0-44.9, adult ([facility])    Disequilibrium syndrome    Generalized weakness    Long-term memory loss    Muscle right arm weakness    Anxiety    Chronic midline low back pain with bilateral sciatica    AMS (altered mental status)    Tremor    COVID    ESRD (end stage renal disease) [facility]    Diabetic ketoacidosis without coma associated with type 2 diabetes mellitus (HCC)    Hypokalemia    Confusion    Myoclonic jerking    Hyperglycemia    Dialysis disequilibrium syndrome    Cerebral hypoperfusion    Spasm of muscle    Low back pain    Altered mental status    Muscle spasm                      57 y.o. female who presented with back muscle spasm, was under treatment for other metabolic issues. Underwent hemodialysis today and subsequently developed right-sided weakness, stroke alert was called. NIH 8, LK W 12:30 PM.  Possible dialysis disequilibrium syndrome, recent cerebrovascular imaging from 11/2021 mentions right supraclinoid ICA 70% stenosis. Decision to proceed with stat MRI brain and MRA head and neck without contrast.  Will evaluate post imaging. 1. Last Known Well (date and time): 12:30 PM    2. Candidate for IV tPA therapy     Yes []     No  [x] due to the following exclusion criteria: Out of window    3. Candidate for Thrombectomy    Yes []      No [x] due to the following exclusion criteria: No LVO. - Discussed with Dr. Hannah Leger contrast  - MRA head and neck without contrast  -We will consider echo if MRI brain is abnormal    Medications   - Aspirin 81  mg daily   - Folic acid 1mg BID  - Atorvastatin 80 mg nightly     Labs  - Fasting Lipid panel  - HbA1c      - PT, OT, Speech eval   - Telemetry   - Neuro checks per protocol  - We recommend SBP <220  - Blood glucose goal less than 180  - Please avoid dextrose containing solutions    Additional recommendations may follow    Please contact [facility] with any changes in patients neurologic status. Thank you for your consult.       Shahzad Núñez MD   PGY 2 Neurology Resident  6/25/2022 at 3:10 PM

## 2022-06-25 NOTE — PROGRESS NOTES
Spoke with Neurologist Dr. Nelly Rudolph @ [phone] via telephone that pt for dialysis after ct scan, pt seen by this Neurologist in HD Unit, Hemo Charge RN updated, Unit RN later update

## 2022-06-25 NOTE — FLOWSHEET NOTE
[facility] [address]  PROGRESS NOTE    Shift date: 6/25/22  Shift day: Saturday   Shift # 1    Room # 0774/0313-01   Name: Flores Leach                Rastafari:    Place of Sikh:     Referral: Rapid Response    Admit Date & Time: 6/23/2022  4:06 PM    Assessment:  Flores Leach is a 57 y.o. female in the hospital. Upon entering the room writer observes many doctors and nurses surrounding the patient. A stoke code by the doctors was called just before. Intervention:  Writer introduced self and title as   .  asked the nurses about the patient and the patient's family. Nurses explained that patient had just moved to the floor and they did not have any more information. Before getting wheeled out for tests,  asked the patient if he could call family members. Patient responded affirmatively and said to call her sister Akosua Alejo. Outcome:   called sister Akosua Alejo. Hailey called nurses station for an update.  gave Hailey esparza's phone number for additional support as needed. Plan:  Chaplains will remain available to offer spiritual and emotional support as needed. Electronically signed by Miranda Brenner, on 6/25/2022 at 3:51 PM.  [facility]  [phone]       06/25/22 1550   Encounter Summary   Service Provided For: Family; Patient   Referral/Consult From: Multi-disciplinary team   Support System Family members   Last Encounter  06/25/22   Complexity of Encounter Moderate   Begin Time 0250   End Time  0310   Total Time Calculated 20 min   Crisis   Type Code Stroke   Assessment/Intervention/Outcome   Assessment Anxious; Coping   Intervention Sustaining Presence/Ministry of presence; Active listening   Outcome Acceptance;Expressed Gratitude

## 2022-06-25 NOTE — PROGRESS NOTES
Renal Progress Note    Patient :  Yvette Spence; 57 y.o. MRN# [id number]  Location:  8942/6925-24  Attending:  Nova Zuleta MD  Admit Date:  6/23/2022   Hospital Day: 0      Subjective:     Patient was admitted with back pain after a fall. She ESRD under Dr. Kenyon Cruz at [facility] Tuesday Thursday Saturday. Back pain and muscle spasms have improved since yesterday. She received partial dialysis treatment yesterday as she only had half of the treatment on Thursday. 2.7 L were removed and treatment was tolerated well. PT/OT evaluation completed, recommends working towards placement for rehab      Outpatient Medications:     Medications Prior to Admission: gabapentin (NEURONTIN) 300 MG capsule, Take 300 mg by mouth 2 times daily. tiZANidine (ZANAFLEX) 4 MG tablet, Take 4 mg by mouth every 6 hours as needed  carvedilol (COREG) 3.125 MG tablet, Take 1 tablet by mouth 2 times daily  insulin glargine (BASAGLAR KWIKPEN) 100 UNIT/ML injection pen, Inject 50 Units into the skin 2 times daily  miconazole (MICOTIN) 2 % powder, Apply topically 2 times daily. melatonin 10 MG CAPS capsule, Take 10 mg by mouth nightly  lidocaine-prilocaine (EMLA) 2.5-2.5 % cream, Apply topically as needed for Pain Indications: Apply to dialysis site Apply topically as needed.    traZODone (DESYREL) 50 MG tablet, TAKE 1 & 1/2 (ONE & ONE-HALF) TABLETS BY MOUTH NIGHTLY  acetaminophen (TYLENOL) 325 MG tablet, Take 650 mg by mouth every 6 hours as needed for Pain  (Patient not taking: Reported on 6/20/2022)  furosemide (LASIX) 80 MG tablet, Take 1 tablet by mouth 2 times daily  sodium bicarbonate 650 MG tablet, Take 1 tablet by mouth 3 times daily  Insulin Pen Needle (EASY TOUCH PEN NEEDLES) 29G X 12MM MISC, 1 each by Does not apply route daily  ReliOn Lancets Micro-Thin 33G MISC, USE AS DIRECTED EVERY DAY  HUMALOG 100 UNIT/ML injection vial, Inject into the skin 3 times daily (before meals) Indications: 15 units and sliding scale (patient reports only the sliding scale)   Blood Glucose Monitoring Suppl (TRUE METRIX GO GLUCOSE METER) w/Device KIT, 1 each by Does not apply route 4 times daily  Lancets MISC, 1 each by Does not apply route daily  febuxostat (ULORIC) 40 MG TABS tablet, Take 1 tablet by mouth daily  docusate sodium (COLACE) 100 MG capsule, Take 1 capsule by mouth 2 times daily  tamsulosin (FLOMAX) 0.4 MG capsule, Take 1 capsule by mouth daily  atorvastatin (LIPITOR) 80 MG tablet, Take 1 tablet by mouth daily  aspirin 81 MG chewable tablet, Take 1 tablet by mouth daily  ranolazine (RANEXA) 1000 MG extended release tablet, Take 1 tablet by mouth 2 times daily  busPIRone (BUSPAR) 7.5 MG tablet, Take 1 tablet by mouth 3 times daily (Patient taking differently: Take 10 mg by mouth 3 times daily )  pantoprazole (PROTONIX) 40 MG tablet, Take 1 tablet by mouth every morning (before breakfast)    Current Medications:     Scheduled Meds:    sodium bicarbonate  650 mg Oral TID    atorvastatin  80 mg Oral Nightly    gabapentin  100 mg Oral BID    heparin (porcine)  5,000 Units SubCUTAneous BID    aspirin  81 mg Oral Daily    busPIRone  10 mg Oral TID    carvedilol  3.125 mg Oral BID    docusate sodium  100 mg Oral BID    furosemide  80 mg Oral BID    insulin glargine  50 Units SubCUTAneous BID    pantoprazole  40 mg Oral QAM AC    ranolazine  1,000 mg Oral BID    tamsulosin  0.4 mg Oral Daily    traZODone  75 mg Oral Nightly    sodium chloride flush  5-40 mL IntraVENous 2 times per day    insulin lispro  0-18 Units SubCUTAneous TID WC    insulin lispro  0-9 Units SubCUTAneous Nightly    potassium bicarb-citric acid  20 mEq Oral Daily     Continuous Infusions:    sodium chloride      dextrose       PRN Meds:  tiZANidine, heparin (porcine), heparin (porcine), sodium chloride flush, sodium chloride, acetaminophen, ondansetron **OR** ondansetron, morphine **OR** morphine, glucose, dextrose bolus **OR** dextrose bolus, glucagon (rDNA), dextrose    Input/Output:       I/O last 3 completed shifts: In: 300   Out: 2700 . Patient Vitals for the past 96 hrs (Last 3 readings):   Weight   22 1220 225 lb 1.4 oz (102.1 kg)   22 0940 228 lb 9.9 oz (103.7 kg)       Vital Signs:   Temperature:  Temp: 98.8 °F (37.1 °C)  TMax:   Temp (24hrs), Av.2 °F (36.8 °C), Min:97.9 °F (36.6 °C), Max:98.8 °F (37.1 °C)    Respirations:  Resp: 18  Pulse:   Heart Rate: 60  BP:    BP: 96/66  BP Range: Systolic (97ZZM), GF , Min:96 , LQM:480       Diastolic (24ANA), RRT:27, Min:42, Max:66      Physical Examination:     General:  AAO x 3, speaking in full sentences, no accessory muscle use. HEENT: Atraumatic, normocephalic, no throat congestion, moist mucosa. Neck:   No JVD, no thyromegaly, no lymphadenopathy. Chest:   Bilateral vesicular breath sounds, no rales or wheezes. Dialysis catheter in place, no signs symptoms of infection  Cardiac:  S1 S2 RR, no murmurs, gallops or rubs, JVP not raised. Abdomen: Soft, non-tender, no masses or organomegaly, BS audible. :   No suprapubic or flank tenderness. Neuro:  AAO x 3, No FND. SKIN:  No rashes, good skin turgor. Extremities:  Trace edema, left lower leg wrapped,+ bruit thrill over left aVF    Labs:       Recent Labs     22  1726 22  0908   WBC 4.3 4.8   RBC 3.44* 3.24*   HGB 10.9* 10.3*   HCT 33.4* 32.2*   MCV 97.1 99.4   MCH 31.7 31.8   MCHC 32.6 32.0   RDW 17.4* 17.6*    164   MPV 10.9 10.9      BMP:   Recent Labs     22  1726 22  0908    135   K 3.3* 3.9   CL 94* 98   CO2 27 24   BUN 15 17   CREATININE 1.72* 2.27*   GLUCOSE 274* 208*   CALCIUM 9.2 8.7      Phosphorus:   No results for input(s): PHOS in the last 72 hours. Magnesium:    Recent Labs     22  1726   MG 2.0     Albumin:  No results for input(s): LABALBU in the last 72 hours.   BNP:      Lab Results   Component Value Date    BNP 41 2015     SPEP:  Lab Results Component Value Date    PROT 6.7 06/18/2022   Hep BsAg:         Lab Results   Component Value Date    HEPBSAG NONREACTIVE 03/30/2022     Hep C AB:          Lab Results   Component Value Date    HEPCAB NONREACTIVE 03/30/2022       Urinalysis/Chemistries:      Lab Results   Component Value Date    NITRU NEGATIVE 06/06/2022    COLORU Yellow 06/06/2022    PHUR 6.5 06/06/2022    WBCUA 21 TO 50 06/06/2022    RBCUA 6 TO 9 06/06/2022    MUCUS 1+ 06/06/2022    TRICHOMONAS NOT REPORTED 11/14/2021    YEAST MANY 05/09/2022    BACTERIA MANY 06/06/2022    SPECGRAV 1.017 06/06/2022    LEUKOCYTESUR MOD 06/06/2022    UROBILINOGEN Normal 06/06/2022    BILIRUBINUR NEGATIVE 06/06/2022    GLUCOSEU LARGE 06/06/2022    KETUA NEGATIVE 06/06/2022    AMORPHOUS 1+ 06/06/2022     Urine Sodium:     Lab Results   Component Value Date    JASON 47 11/14/2021     Urine Potassium:  No results found for: KUR  Urine Chloride:    Lab Results   Component Value Date    CLUR 26 11/14/2021       Urine Creatinine:     Lab Results   Component Value Date    LABCREA 83.1 03/11/2022    LABCREA 72.0 11/14/2021         Radiology:     CT 6/23/2022  Impression   No acute abnormality of the cervical spine.  No fracture.       Changes related to mature instrumented anterior fusion of C5-C6 and C6-C7.       At C4-C5, moderate bilateral neural foraminal narrowing.       At C5-C6, severe bilateral neural foraminal narrowing. Impression   No acute fracture of the thoracic spine.       Dependent opacities in the partially imaged left lung which are likely due to   atelectasis, though clinical correlation is required to exclude contributory   infection.       Trace left pleural effusion. Assessment:     1. ESRD Tuesday Thursday Saturday at [facility] unit using tunnel catheter and follows up with Dr. Johnathon Cevallos.  Has immature left upper extremity fistula also  2.  ParaVertebral thoracic muscle spasms status post fall with imaging studies negative for

## 2022-06-25 NOTE — PROGRESS NOTES
Pt arrived into room 250  On stretcher with Dr Leeanne Bhatti at bedside and Dr Leeanne Bhatti  states to call a stroke alert. Vitals 145/61 74 16 and blood sugar 143 Dr Leeanne Bhatti and stroke team access patient and ordered MRI . Pt unable to move right arm and right leg. Writer and students took patient to MRI per bed. Writer remains with patient for entire MRI. Patient back to room and alert and orient talking with family on the  phone.

## 2022-06-25 NOTE — PROGRESS NOTES
[address]  CDU / OBSERVATION ENCOUNTER  ATTENDING NOTE       I performed a history and physical examination of the patient and discussed management with the resident or midlevel provider. I reviewed the resident or midlevel provider's note and agree with the documented findings and plan of care. Any areas of disagreement are noted on the chart. I was personally present for the key portions of any procedures. I have documented in the chart those procedures where I was not present during the key portions. I have reviewed the nurses notes. I agree with the chief complaint, past medical history, past surgical history, allergies, medications, social and family history as documented unless otherwise noted below. The Family history, social history, and ROS are effectively unchanged since admission unless noted elsewhere in the chart. This 75-year-old female patient initially presented to the emergency department with complaint of back pain and left upper extremity numbness subsequent to a fall which she sustained several days ago. Patient indicates that the numbness in the left upper extremity has been present since she received an AV graft for purposes of dialysis but she reports that at the time of presentation the numbness has worsened. Per the nursing staff the patient demonstrated some somnolence this morning and was evaluated by the neurology service with CT scan which was interpreted as negative for acute findings. The note from neurology indicates the patient was somewhat somnolent during their evaluation this morning but that she was moving all extremities equally well. Patient subsequently went to dialysis and now upon return on our evaluation the patient remains with somewhat depressed level of consciousness but she is able to respond and follow commands. She is oriented to person and place only.   Of particular note is what appears to be significant weakness of the right upper and lower extremities. Pupils are 3 mm equal round and reactive. Extraocular movements appear to be grossly intact. Cranial nerves II through XII appear to be grossly intact. Lungs are clear to auscultation bilaterally. Cardiac exam demonstrates an S1-S2. No murmurs, rubs, gallop. Abdomen is soft, nondistended nontender. Given the new focal neurologic findings we declared a stroke alert. Neurology team responded appropriately and have ordered an MRA which is currently ongoing. Shade Lillian.  Fabio Rincon MD [facility]  Attending Emergency  Physician

## 2022-06-25 NOTE — PLAN OF CARE
Problem: Chronic Conditions and Co-morbidities  Goal: Patient's chronic conditions and co-morbidity symptoms are monitored and maintained or improved  6/25/2022 1309 by Letha Mora RN  Outcome: Progressing  6/25/2022 0226 by Bienvenido Roy RN  Outcome: Progressing     Problem: Skin/Tissue Integrity  Goal: Absence of new skin breakdown  Description: 1. Monitor for areas of redness and/or skin breakdown  2. Assess vascular access sites hourly  3. Every 4-6 hours minimum:  Change oxygen saturation probe site  4. Every 4-6 hours:  If on nasal continuous positive airway pressure, respiratory therapy assess nares and determine need for appliance change or resting period.   6/25/2022 1309 by Letha Mora RN  Outcome: Progressing  6/25/2022 0226 by Bienvenido Roy RN  Outcome: Progressing     Problem: Safety - Adult  Goal: Free from fall injury  6/25/2022 1309 by Letha Mora RN  Outcome: Progressing  Flowsheets (Taken 6/25/2022 1305)  Free From Fall Injury: Instruct family/caregiver on patient safety  6/25/2022 0226 by Bienvenido Roy RN  Outcome: Progressing     Problem: Pain  Goal: Verbalizes/displays adequate comfort level or baseline comfort level  6/25/2022 1309 by Letha Mora RN  Outcome: Progressing  6/25/2022 0226 by Bienvenido Roy RN  Outcome: Progressing     Problem: ABCDS Injury Assessment  Goal: Absence of physical injury  6/25/2022 1309 by Letha Mora RN  Outcome: Progressing  Flowsheets (Taken 6/25/2022 1305)  Absence of Physical Injury: Implement safety measures based on patient assessment  6/25/2022 0226 by Bienvenido Roy RN  Outcome: Progressing     Problem: Nutrition Deficit:  Goal: Optimize nutritional status  6/25/2022 1309 by Letha Mora RN  Outcome: Progressing  6/25/2022 0226 by Bienvenido Roy RN  Outcome: Progressing

## 2022-06-25 NOTE — PROGRESS NOTES
Dialysis Time Out  To be done by RN and tech or 2 RNs  Staff Names Andi Hua., Hemo RN / Severiano Reis, OCDT    [x]  Identity of the patient using 2 patient identifiers  [x]  Consent for treatment  [x]  Equipment-proper machine and dialyzer  [x]  B-Hep B status PER OUT-PT FACILITY  [x]  Orders- to include bath, blood flow, dialyzer, time and fluid removal  [x]  Access-Correct site and in working order  [x]  Time for patient to ask questions.

## 2022-06-25 NOTE — PROGRESS NOTES
Patient Lethargic/difficult to arouse on assessment. Rn asked patient orientation questions, patient unable to answer questions appropriately. Patient disoriented x4. Rn contacted Dr. Reyes Viramontes, obs resident to come to the bedside for an assessment. She assessed and placed orders for a stat CT head.  Patient left for CT head exam.

## 2022-06-25 NOTE — H&P
[address]     Department of Internal Medicine - Staff Internal Medicine Teaching Service          ADMISSION NOTE/HISTORY AND PHYSICAL EXAMINATION   Date: 6/26/2022  Patient Name: Edwin Singh  Date of admission: 6/23/2022  4:06 PM  YOB: 1960  PCP: AFSANEH Petersen CNP  History Obtained From:  patient, electronic medical record    CHIEF COMPLAINT     Chief complaint: Upper back pain s/p fall    HISTORY OF PRESENTING ILLNESS     The patient is a pleasant 57 y.o. female presents with a chief complaint of severe upper back pain, muscle spasms, sharp in nature, for 2 days before admission on 6/23/2022. Patient had a fall about 2 days ago before admission and since then she has had upper back pain. CT cervical spine shows no acute abnormalities no fracture, changes related to mature instrumented anterior fusion of C5-C6 and C6-C7 were seen. CT of thoracic spine showed no acute fracture of thoracic spine. Patient was admitted under observation unit on 6/23/2022. Patient also complained of numbness and tingling in the left arm to 1 week and she was noted to have diminished radial pulses with palpation of upper extremity on left per ED resident. Vascular surgery evaluated the patient and recommended outpatient follow-up with Dr. Gogo Lloyd in to discuss neck steps regarding fistula function. Patient underwent an extra session of hemodialysis on 6/24/2022, due to only receiving partial dialysis treatment on 6/23/22 due to severe mid-throacic spasm and pain during dialysis. Patient was found to be confused on 6/25/22 and she was only oriented x1. Neurology was consulted for concern for altered mental status and left upper extremity numbness. CT head was done that showed no acute intracranial abnormality please, showed senescent changes including cortical atrophy, atherosclerotic disease of major intracranial vessels and chronic white matter changes.   Today patient completed her regularly scheduled hemodialysis and return to the floor. Patient still had persistent weakness on the right side and stroke alert was called. Patient has NIH score of 8. Neurology recommended MRI brain and MRA head and neck without contrast, both were negative for any acute stroke or ischemic changes. EEG was also ordered. And patient was transferred to internal medicine for further evaluation and management. Patient has a PMH significant of chronic respiratory failure (on 3 L nasal cannula oxygen), type 2 diabetes mellitus, morbid obesity(BMI 36.25), CAD s/p CABG, ESRD on HD (TTS schedule- via HD tunneled catheter on right IJ), dialysis disequilibrium syndrome, essential hypertension, HLD, and CHFpEF. Significant labs,   K 3.4, Cr 2.13,   Hgb 11.2, Plt 134    Patient is admitted for acute metabolic encephalopathy in the setting of dialysis disequilibrium syndrome. Review of Systems:  Review of Systems   Constitutional: Negative for appetite change, chills and fever. HENT: Negative for ear pain, nosebleeds and sore throat. Respiratory: Negative for cough, shortness of breath and wheezing. Cardiovascular: Negative for chest pain, palpitations and leg swelling. Gastrointestinal: Negative for abdominal pain, blood in stool, diarrhea, nausea and vomiting. Genitourinary: Negative for dysuria, hematuria and urgency. Musculoskeletal: Positive for back pain and myalgias. Negative for gait problem and neck pain. Neurological: Positive for weakness and numbness. Negative for dizziness, seizures, syncope and headaches. Psychiatric/Behavioral: Positive for dysphoric mood. Negative for agitation, behavioral problems and confusion. All other systems reviewed and are negative.       PAST MEDICAL HISTORY     Past Medical History:   Diagnosis Date    Arthritis     Backache, unspecified     Cerebral artery occlusion with cerebral infarction [facility] [address]     CH[facility]est[facility] failure[facility] [address]     Chronic kidney disease     Coronary atherosclerosis of artery bypass graft     COVID 1/31/2022    Cramp of limb     Gallstones     [facility]od[facility] [address]     Hyperlipidemia     Hypertension     Insomnia     Neuromuscular disorder ([facility] [address]     Pneumonia     Psychiatric problem     Thyroid disease     Type II or unspecified type diabetes mellitus with renal manifestations, not stated as uncontrolled(250.40)     Type II or unspecified type diabetes mellitus without mention of complication, not stated as uncontrolled     Unspecified vitamin D deficiency        PAST SURGICAL HISTORY     Past Surgical History:   Procedure Laterality Date    ANKLE FRACTURE SURGERY      AV FISTULA CREATION  12/14/2021    BACK SURGERY      BREAST SURGERY      CARDIAC SURGERY      CARPAL TUNNEL RELEASE      CHOLECYSTECTOMY, OPEN N/A     COLONOSCOPY      CORONARY ARTERY BYPASS GRAFT      x3    DIALYSIS FISTULA CREATION Left 12/14/2021    LEFT AV FISTULA CREATION UPPER EXTREMITY performed by Sierra Estrada MD at [address] [facility]      HAND SURGERY      IR TUNNELED CATHETER PLACEMENT GREATER THAN 5 YEARS  8/18/2021    IR TUNNELED CATHETER PLACEMENT GREATER THAN 5 YEARS 8/18/2021 [facility]    KNEE ARTHROSCOPY      right    OTHER SURGICAL HISTORY  04/06/2022    cvc exchange    TONSILLECTOMY         ALLERGIES     Adhesive tape, Ace inhibitors, Iv dye [iodides], Nsaids, and Metformin and related    MEDICATIONS PRIOR TO ADMISSION     Prior to Admission medications    Medication Sig Start Date End Date Taking? Authorizing Provider   gabapentin (NEURONTIN) 300 MG capsule Take 300 mg by mouth 2 times daily.    Yes Historical Provider, MD   tiZANidine (ZANAFLEX) 4 MG tablet Take 4 mg by mouth every 6 hours as needed   Yes Historical Provider, MD   carvedilol (COREG) 3.125 MG tablet Take 1 tablet by mouth 2 times daily 6/15/22 7/15/22  Olivia March, APRN - CNP   insulin glargine (BASAGLAR KWIKPEN) 100 UNIT/ML injection pen Inject 50 Units into the skin 2 times daily 6/11/22   Ryan Figueroa MD   miconazole (MICOTIN) 2 % powder Apply topically 2 times daily. 4/20/22   Allison Granda MD   melatonin 10 MG CAPS capsule Take 10 mg by mouth nightly    Historical Provider, MD   lidocaine-prilocaine (EMLA) 2.5-2.5 % cream Apply topically as needed for Pain Indications: Apply to dialysis site Apply topically as needed.      Historical Provider, MD   traZODone (DESYREL) 50 MG tablet TAKE 1 & 1/2 (ONE & ONE-HALF) TABLETS BY MOUTH NIGHTLY 4/11/22   AFSANEH Vargas CNP   acetaminophen (TYLENOL) 325 MG tablet Take 650 mg by mouth every 6 hours as needed for Pain   Patient not taking: Reported on 6/20/2022    Historical Provider, MD   furosemide (LASIX) 80 MG tablet Take 1 tablet by mouth 2 times daily 3/8/22   AFSANEH Vargas CNP   sodium bicarbonate 650 MG tablet Take 1 tablet by mouth 3 times daily 3/2/22   [facility], MD   Insulin Pen Needle (EASY TOUCH PEN NEEDLES) 29G X 12MM MISC 1 each by Does not apply route daily 3/1/22   AFSANEH Vargas CNP   ReliOn Lancets Micro-Thin 33G MISC USE AS DIRECTED EVERY DAY 1/28/22   Historical Provider, MD   HUMALOG 100 UNIT/ML injection vial Inject into the skin 3 times daily (before meals) Indications: 15 units and sliding scale (patient reports only the sliding scale)  11/29/21   Historical Provider, MD   Blood Glucose Monitoring Suppl (TRUE METRIX GO GLUCOSE METER) w/Device KIT 1 each by Does not apply route 4 times daily 11/30/21   AFSANEH Vargas CNP   Lancets MISC 1 each by Does not apply route daily 11/30/21   AFSANEH Vargas CNP   febuxostat (ULORIC) 40 MG TABS tablet Take 1 tablet by mouth daily 11/22/21   Charly Vallejo MD   docusate sodium (COLACE) 100 MG capsule Take 1 capsule by mouth 2 times daily 11/22/21   Charly Vallejo MD   tamsulosin (FLOMAX) 0.4 MG capsule Take 1 capsule by mouth daily 21   Gae Libman, MD   atorvastatin (LIPITOR) 80 MG tablet Take 1 tablet by mouth daily 11/3/21   AFSANEH Cope - CNP   aspirin 81 MG chewable tablet Take 1 tablet by mouth daily 21   Tono Camarillo MD   ranolazine (RANEXA) 1000 MG extended release tablet Take 1 tablet by mouth 2 times daily 21   Tono Camarillo MD   busPIRone (BUSPAR) 7.5 MG tablet Take 1 tablet by mouth 3 times daily  Patient taking differently: Take 10 mg by mouth 3 times daily  21   Tono Camarillo MD   pantoprazole (PROTONIX) 40 MG tablet Take 1 tablet by mouth every morning (before breakfast) 21   Tono Camarillo MD   allopurinol (ZYLOPRIM) 100 MG tablet Take 1 tablet by mouth daily 21   Uma Vega, AFSANEH [facility]       SOCIAL HISTORY     Tobacco: Denies use  Alcohol: Denies use  Illicits: Denies use  Occupation: N/A    FAMILY HISTORY     Family History   Problem Relation Age of Onset    Diabetes Father     Heart Failure Father        PHYSICAL EXAM     Vitals: BP (!) 152/92   Pulse 82   Temp 98.3 °F (36.8 °C) (Oral)   Resp 16   Ht 5' 5\" (1.651 m)   Wt 217 lb 13 oz (98.8 kg)   SpO2 100%   BMI 36.25 kg/m²   Tmax: Temp (24hrs), Av.2 °F (36.8 °C), Min:97.5 °F (36.4 °C), Max:98.9 °F (37.2 °C)    Last Body weight:   Wt Readings from Last 3 Encounters:   22 217 lb 13 oz (98.8 kg)   06/15/22 218 lb (98.9 kg)   22 219 lb 2.2 oz (99.4 kg)     Body Mass Index : Body mass index is 36.25 kg/m². PHYSICAL EXAMINATION:  Constitutional: This is a well developed, well nourished, 35-39.9 - Obesity Grade II 57y.o. year old female who is alert, oriented, cooperative and in no apparent distress. Head:normocephalic and atraumatic. EENT:  PERRLA. No conjunctival injections. Septum was midline, mucosa was without erythema, exudates or cobblestoning. No thrush was noted. Neck: Supple without thyromegaly. No elevated JVP. Trachea was midline.   Respiratory: Chest was symmetrical without dullness to percussion. Breath sounds bilaterally were clear to auscultation. There were no wheezes, rhonchi or rales. There is no intercostal retraction or use of accessory muscles. No egophony noted. Cardiovascular: Regular without murmur, clicks, gallops or rubs. Abdomen: Slightly rounded and soft without organomegaly. No rebound, rigidity or guarding was appreciated. Lymphatic: No lymphadenopathy. Musculoskeletal: Normal curvature of the spine. No gross muscle weakness. Extremities:  No lower extremity edema, ulcerations, tenderness, varicosities or erythema. Muscle size, tone and strength are normal.  No involuntary movements are noted. Skin:  Warm and dry. Good color, turgor and pigmentation. No lesions or scars.   No cyanosis or clubbing  Neurological/Psychiatric: The patient's general behavior, level of consciousness, thought content and emotional status is normal.          INVESTIGATIONS     Laboratory Testing:     Recent Results (from the past 24 hour(s))   POC Glucose Fingerstick    Collection Time: 06/25/22  7:33 AM   Result Value Ref Range    POC Glucose 322 (H) 65 - 105 mg/dL   POC Glucose Fingerstick    Collection Time: 06/25/22 10:17 AM   Result Value Ref Range    POC Glucose 267 (H) 65 - 105 mg/dL   POC Glucose Fingerstick    Collection Time: 06/25/22  2:50 PM   Result Value Ref Range    POC Glucose 143 (H) 65 - 105 mg/dL   Comprehensive Metabolic Panel    Collection Time: 06/25/22  8:05 PM   Result Value Ref Range    Glucose 171 (H) 70 - 99 mg/dL    BUN 12 8 - 23 mg/dL    CREATININE 2.13 (H) 0.50 - 0.90 mg/dL    Calcium 8.1 (L) 8.6 - 10.4 mg/dL    Sodium 135 135 - 144 mmol/L    Potassium 3.4 (L) 3.7 - 5.3 mmol/L    Chloride 98 98 - 107 mmol/L    CO2 25 20 - 31 mmol/L    Anion Gap 12 9 - 17 mmol/L    Alkaline Phosphatase 88 35 - 104 U/L    ALT 12 5 - 33 U/L    AST 23 <32 U/L    Total Bilirubin 0.97 0.3 - 1.2 mg/dL    Total Protein 6.6 6.4 - 8.3 g/dL    Albumin 3.2 (L) 3.5 - 5.2 g/dL    Albumin/Globulin Ratio 0.9 (L) 1.0 - 2.5    GFR Non-African American 23 (L) >60 mL/min    GFR  28 (L) >60 mL/min    GFR Comment         CBC with Auto Differential    Collection Time: 06/25/22  8:05 PM   Result Value Ref Range    WBC 5.6 3.5 - 11.3 k/uL    RBC 3.44 (L) 3.95 - 5.11 m/uL    Hemoglobin 11.2 (L) 11.9 - 15.1 g/dL    Hematocrit 33.0 (L) 36.3 - 47.1 %    MCV 95.9 82.6 - 102.9 fL    MCH 32.6 25.2 - 33.5 pg    MCHC 33.9 28.4 - 34.8 g/dL    RDW 18.0 (H) 11.8 - 14.4 %    Platelets 170 (L) 097 - 453 k/uL    MPV 10.8 8.1 - 13.5 fL    NRBC Automated 0.4 (H) 0.0 per 100 WBC    Seg Neutrophils 69 (H) 36 - 65 %    Lymphocytes 17 (L) 24 - 43 %    Monocytes 10 3 - 12 %    Eosinophils % 2 1 - 4 %    Basophils 1 0 - 2 %    Immature Granulocytes 1 (H) 0 %    Segs Absolute 3.90 1.50 - 8.10 k/uL    Absolute Lymph # 0.98 (L) 1.10 - 3.70 k/uL    Absolute Mono # 0.57 0.10 - 1.20 k/uL    Absolute Eos # 0.13 0.00 - 0.44 k/uL    Basophils Absolute 0.03 0.00 - 0.20 k/uL    Absolute Immature Granulocyte 0.03 0.00 - 0.30 k/uL    RBC Morphology ANISOCYTOSIS PRESENT    Ammonia    Collection Time: 06/25/22  8:05 PM   Result Value Ref Range    Ammonia 38 11 - 51 umol/L   POC Glucose Fingerstick    Collection Time: 06/25/22  8:53 PM   Result Value Ref Range    POC Glucose 154 (H) 65 - 105 mg/dL       Imaging:   XR TIBIA FIBULA LEFT (2 VIEWS)    Result Date: 6/18/2022  No acute osseous abnormality. Mild widening of the medial clear space of the ankle may reflect ligamentous injury. XR ANKLE LEFT (MIN 3 VIEWS)    Result Date: 6/18/2022  No acute osseous abnormality. Mild widening of the medial clear space of the ankle may reflect ligamentous injury. CT HEAD WO CONTRAST    Result Date: 6/25/2022  No acute intracranial abnormality. Senescent changes including cortical atrophy, atherosclerotic disease of the major intracranial vessels and chronic white matter changes.   There is no significant change from hospital problems. *    1. Acute metabolic encephalopathy likely secondary to dialysis disequilibrium syndrome: CT head, MRI Brain and MRA head and neck- negative for CVA. Follow up on EEG. Neurology following, appreciate recommendations. Ammonia levels normal. HD per nephrology. Monitor for any acute change in mentation. 2. Mid-thoracic muscle spasm: Avoid narcotics to assess mentation per Neurology. Flexeril 5 mg TID PRN for muscle spasm. 3. ESRD on HD (TTS schedule): HD per nephrology. 4. CHFpEF: Continue Lasix 80 mg BID. Monitor BP closely. Last Echo in 04/2022 showed LVEF >55% and grade-1 Diastolic dysfunction. 5. Type-2 DM: HBA1c ordered, pending. Continue Lantus 50 units BID. POCT glucose 4 times daily. Hypoglycemia protocol. Medium dose sliding scale. 6. Morbid Obesity (BMI 36.27): Follow as outpatient with PCP. 7. CAD s/p CABG: Continue ASA, statin, Coreg. 8. Hypokalemia: Replace as needed per Nephrology. Monitor daily BMP. DVT ppx: Heparin sc  GI ppx: Protonix    PT/OT/SW: Consulted  Discharge Planning:  consulted, will follow up     Jaky Tong MD  Internal Medicine Resident, PGY-1  [address] [facility];  [place], [place]  6/26/2022, 12:18 AM

## 2022-06-25 NOTE — CONSULTS
potassium bicarb-citric acid  20 mEq Oral Daily       Allergies:   Art Jumper is allergic to adhesive tape, ace inhibitors, iv dye [iodides], nsaids, and metformin and related. ROS:   Constitutional Negative for fever and chills   HEENT Negative for ear discharge, ear pain, nosebleed   Eyes Negative for photophobia, pain and discharge   Respiratory Negative for hemoptysis and sputum   Cardiovascular Negative for orthopnea, claudication and PND   Gastrointestinal Negative for abdominal pain, diarrhea, blood in stool   Musculoskeletal Negative for joint pain, negative for myalgia   Skin Negative for rash or itching   hematology Negative for ecchymosis, anemia   Psychiatric Negative for suicidal ideation, anxiety, depression, hallucinations       Objective:   BP 96/66   Pulse 60   Temp 98.8 °F (37.1 °C) (Oral)   Resp 18   Ht 5' 5\" (1.651 m)   Wt 225 lb 1.4 oz (102.1 kg)   SpO2 94%   BMI 37.46 kg/m²         General examination:    HEENT: Normocephalic, atraumatic, neck supple (-)nuchal rigidity  Lungs: Respirations unlabored, chest wall no deformity  Heart: Regular rate rhythm  Abdomen: Soft, non distended, non tender  Extremities: No cyanosis, clubbing or edema, 2+ pulses  Skin: Intact, normal skin color, no ecchymosis    Neurological examination:    Mental status   Somnolent and difficult to arouse but able to follow commands intermittently. Speech no clear dysarthria or aphasia.      Cranial nerves   II - visual fields intact to confrontation; pupils reactive  III, IV, VI - extraocular muscles intact; no CECELIA; no nystagmus; no ptosis   V - normal facial sensation                                                               VII - normal facial symmetry                                                             VIII - intact hearing                                                                             IX, X - symmetrical palate elevation                                               XI - symmetrical shoulder shrug                                                       XII - midline tongue without atrophy or fasciculation     Motor function   moving all extremities spontaneously on command and after receiving noxious stimuli                   Sensory function  intact to LT in upper and lower extremities bilaterally     Cerebellar  deferred     Reflex function  deferred   Gait                   deferred           LABS:    CBC:   Recent Labs     06/23/22  1726 06/24/22  0908   WBC 4.3 4.8   HGB 10.9* 10.3*    164     BMP:    Recent Labs     06/23/22  1726 06/24/22  0908    135   K 3.3* 3.9   CL 94* 98   CO2 27 24   BUN 15 17   CREATININE 1.72* 2.27*   GLUCOSE 274* 208*         Lab Results   Component Value Date    CHOL 105 04/09/2015    LDLCHOLESTEROL 72 12/26/2012    HDL 43 04/09/2015    TRIG 168 04/09/2015    ALT 14 06/18/2022    AST 22 06/18/2022    TSH 0.49 06/18/2022    INR 0.9 11/10/2021    LABA1C 11.3 (H) 04/05/2022    LABMICR 538 12/26/2012    CHQFUFKA20 459 12/26/2012       No results found for: PHENYTOIN, PHENYTOIN, VALPROATE, CBMZ    IMAGING:   CT Cervical Spine-No acute abnormality of the cervical spine. No fracture. Changes related to mature instrumented anterior fusion of C5-C6 and C6-C7. At C4-C5, moderate bilateral neural foraminal narrowing. All of the above medications, clinical laboratory, imaging and other diagnostic tests were reviewed by myself. IMPRESSION:   68-year-old female who was initially evaluated after sustaining a fall and presenting with spasms for several days. She was evaluated by vascular as she was complaining of numbness and tingling in her left arm and hand. They recommended outpatient follow-up. She is a dialysis patient and neurology was consulted for worsening mentation, there is no clear history of seizures, will repeat an ammonia and proceed with routine EEG.   Etiology is unclear at there is no underlying infection. Neurological-altered mental status  -Continue to monitor for any neurological changes  -We will proceed with routine EEG  -Repeat ammonia level  -Follow-up CMP and rule out any underlying infection.  -We will continue to monitor. Thank you for this very interesting consultation.       Electronically signed by Corey Ivan MD on 6/25/2022 at 8:50 AM      Corey Ivan MD  [facility]  Neurology

## 2022-06-25 NOTE — PROGRESS NOTES
CDU Transfer Summary        Patient:  Lyndsay Archer  YOB: 1960    MRN: [id number]   Acct: [de-identified]    Primary Care Physician: AFSANEH Mcmahon CNP    Admit date:  6/23/2022  4:06 PM  Transfer date: No discharge date for patient encounter. 6/25/22    Transfer Diagnoses:     1.) Altered mental status           Medication List      CONTINUE taking these medications    Easy Touch Pen Needles 29G X 12MM Misc  Generic drug: Insulin Pen Needle  1 each by Does not apply route daily     * Lancets Misc  1 each by Does not apply route daily     * ReliOn Lancets Micro-Thin 33G Misc     True Metrix Go Glucose Meter w/Device Kit  1 each by Does not apply route 4 times daily         * This list has 2 medication(s) that are the same as other medications prescribed for you. Read the directions carefully, and ask your doctor or other care provider to review them with you.             ASK your doctor about these medications    acetaminophen 325 MG tablet  Commonly known as: TYLENOL     aspirin 81 MG chewable tablet  Take 1 tablet by mouth daily     atorvastatin 80 MG tablet  Commonly known as: LIPITOR  Take 1 tablet by mouth daily     Basaglar KwikPen 100 UNIT/ML injection pen  Generic drug: insulin glargine  Inject 50 Units into the skin 2 times daily     busPIRone 7.5 MG tablet  Commonly known as: BUSPAR  Take 1 tablet by mouth 3 times daily     carvedilol 3.125 MG tablet  Commonly known as: COREG  Take 1 tablet by mouth 2 times daily     docusate sodium 100 MG capsule  Commonly known as: COLACE  Take 1 capsule by mouth 2 times daily     febuxostat 40 MG Tabs tablet  Commonly known as: ULORIC  Take 1 tablet by mouth daily     furosemide 80 MG tablet  Commonly known as: LASIX  Take 1 tablet by mouth 2 times daily     gabapentin 300 MG capsule  Commonly known as: NEURONTIN     HumaLOG 100 UNIT/ML injection vial  Generic drug: insulin lispro     lidocaine-prilocaine 2.5-2.5 % cream  Commonly known as: EMLA melatonin 10 MG Caps capsule     miconazole 2 % powder  Commonly known as: MICOTIN  Apply topically 2 times daily. pantoprazole 40 MG tablet  Commonly known as: PROTONIX  Take 1 tablet by mouth every morning (before breakfast)     ranolazine 1000 MG extended release tablet  Commonly known as: RANEXA  Take 1 tablet by mouth 2 times daily     sodium bicarbonate 650 MG tablet  Take 1 tablet by mouth 3 times daily     tamsulosin 0.4 MG capsule  Commonly known as: FLOMAX  Take 1 capsule by mouth daily     tiZANidine 4 MG tablet  Commonly known as: ZANAFLEX     traZODone 50 MG tablet  Commonly known as: DESYREL  TAKE 1 & 1/2 (ONE & ONE-HALF) TABLETS BY MOUTH NIGHTLY            Diet:  ADULT ORAL NUTRITION SUPPLEMENT; Breakfast, Dinner; Renal Oral Supplement  ADULT DIET; Regular; 5 carb choices (75 gm/meal), advance as tolerated     Activity:  As tolerated    Consultants: IP CONSULT TO VASCULAR SURGERY  IP CONSULT TO NEPHROLOGY  IP CONSULT TO NEUROLOGY    Procedures:  Not indicated     Diagnostic Test:   Results for orders placed or performed during the hospital encounter of 06/23/22   COVID-19, Rapid    Specimen: Nasopharyngeal Swab   Result Value Ref Range    Specimen Description . NASOPHARYNGEAL SWAB     SARS-CoV-2, Rapid Not Detected Not Detected   CBC with Auto Differential   Result Value Ref Range    WBC 4.3 3.5 - 11.3 k/uL    RBC 3.44 (L) 3.95 - 5.11 m/uL    Hemoglobin 10.9 (L) 11.9 - 15.1 g/dL    Hematocrit 33.4 (L) 36.3 - 47.1 %    MCV 97.1 82.6 - 102.9 fL    MCH 31.7 25.2 - 33.5 pg    MCHC 32.6 28.4 - 34.8 g/dL    RDW 17.4 (H) 11.8 - 14.4 %    Platelets 095 552 - 300 k/uL    MPV 10.9 8.1 - 13.5 fL    NRBC Automated 0.0 0.0 per 100 WBC    Seg Neutrophils 67 (H) 36 - 65 %    Lymphocytes 18 (L) 24 - 43 %    Monocytes 8 3 - 12 %    Eosinophils % 5 (H) 1 - 4 %    Basophils 1 0 - 2 %    Immature Granulocytes 1 (H) 0 %    Segs Absolute 2.90 1.50 - 8.10 k/uL    Absolute Lymph # 0.77 (L) 1.10 - 3.70 k/uL    Absolute Mono # 0.35 0.10 - 1.20 k/uL    Absolute Eos # 0.22 0.00 - 0.44 k/uL    Basophils Absolute <0.03 0.00 - 0.20 k/uL    Absolute Immature Granulocyte 0.03 0.00 - 0.30 k/uL    RBC Morphology ANISOCYTOSIS PRESENT    Basic Metabolic Panel w/ Reflex to MG   Result Value Ref Range    Glucose 274 (H) 70 - 99 mg/dL    BUN 15 8 - 23 mg/dL    CREATININE 1.72 (H) 0.50 - 0.90 mg/dL    Calcium 9.2 8.6 - 10.4 mg/dL    Sodium 135 135 - 144 mmol/L    Potassium 3.3 (L) 3.7 - 5.3 mmol/L    Chloride 94 (L) 98 - 107 mmol/L    CO2 27 20 - 31 mmol/L    Anion Gap 14 9 - 17 mmol/L    GFR Non-African American 30 (L) >60 mL/min    GFR  36 (L) >60 mL/min    GFR Comment         Magnesium   Result Value Ref Range    Magnesium 2.0 1.6 - 2.6 mg/dL   Basic Metabolic Panel   Result Value Ref Range    Glucose 208 (H) 70 - 99 mg/dL    BUN 17 8 - 23 mg/dL    CREATININE 2.27 (H) 0.50 - 0.90 mg/dL    Calcium 8.7 8.6 - 10.4 mg/dL    Sodium 135 135 - 144 mmol/L    Potassium 3.9 3.7 - 5.3 mmol/L    Chloride 98 98 - 107 mmol/L    CO2 24 20 - 31 mmol/L    Anion Gap 13 9 - 17 mmol/L    GFR Non-African American 22 (L) >60 mL/min    GFR  26 (L) >60 mL/min    GFR Comment         CBC   Result Value Ref Range    WBC 4.8 3.5 - 11.3 k/uL    RBC 3.24 (L) 3.95 - 5.11 m/uL    Hemoglobin 10.3 (L) 11.9 - 15.1 g/dL    Hematocrit 32.2 (L) 36.3 - 47.1 %    MCV 99.4 82.6 - 102.9 fL    MCH 31.8 25.2 - 33.5 pg    MCHC 32.0 28.4 - 34.8 g/dL    RDW 17.6 (H) 11.8 - 14.4 %    Platelets 855 162 - 478 k/uL    MPV 10.9 8.1 - 13.5 fL    NRBC Automated 0.6 (H) 0.0 per 100 WBC   POC Glucose Fingerstick   Result Value Ref Range    POC Glucose 220 (H) 65 - 105 mg/dL   POC Glucose Fingerstick   Result Value Ref Range    POC Glucose 151 (H) 65 - 105 mg/dL   POC Glucose Fingerstick   Result Value Ref Range    POC Glucose 186 (H) 65 - 105 mg/dL   POC Glucose Fingerstick   Result Value Ref Range    POC Glucose 196 (H) 65 - 105 mg/dL   POC Glucose Fingerstick Result Value Ref Range    POC Glucose 247 (H) 65 - 105 mg/dL   POC Glucose Fingerstick   Result Value Ref Range    POC Glucose 322 (H) 65 - 105 mg/dL   POC Glucose Fingerstick   Result Value Ref Range    POC Glucose 267 (H) 65 - 105 mg/dL   POC Glucose Fingerstick   Result Value Ref Range    POC Glucose 143 (H) 65 - 105 mg/dL     CT CERVICAL SPINE WO CONTRAST    Result Date: 6/23/2022  EXAMINATION: CT OF THE CERVICAL SPINE WITHOUT CONTRAST 6/23/2022 9:28 pm TECHNIQUE: CT of the cervical spine was performed without the administration of intravenous contrast. Multiplanar reformatted images are provided for review. Automated exposure control, iterative reconstruction, and/or weight based adjustment of the mA/kV was utilized to reduce the radiation dose to as low as reasonably achievable. COMPARISON: None. HISTORY: ORDERING SYSTEM PROVIDED HISTORY: neck pain TECHNOLOGIST PROVIDED HISTORY: neck pain Reason for Exam: neck pain FINDINGS: BONES/ALIGNMENT: There is no acute fracture or traumatic malalignment. DEGENERATIVE CHANGES: C2-C3: Unremarkable. C3-C4: Posterior uncovertebral hypertrophy disc bulging. Moderate bilateral facet arthropathy. Mild bilateral neural foraminal. C4-C5: Grade 1 anterolisthesis. Mild bilateral facet arthropathy. Moderate bilateral neural foraminal narrowing. C5-C6: Changes related to mature instrumented anterior fusion. Moderate bilateral set arthropathy. Severe bilateral neural foraminal narrowing. C6-C7: Changes related to mature instrumented anterior fusion. No canal or foraminal narrowing. C7-T1: Unremarkable. SOFT TISSUES: There is no prevertebral soft tissue swelling. Right central line is partially visualized. No acute abnormality of the cervical spine. No fracture. Changes related to mature instrumented anterior fusion of C5-C6 and C6-C7. At C4-C5, moderate bilateral neural foraminal narrowing. At C5-C6, severe bilateral neural foraminal narrowing.  RECOMMENDATIONS: Unavailable     CT THORACIC SPINE WO CONTRAST    Result Date: 6/23/2022  EXAMINATION: CT OF THE THORACIC SPINE WITHOUT CONTRAST  6/23/2022 2:14 pm: TECHNIQUE: CT of the thoracic spine was performed without the administration of intravenous contrast. Multiplanar reformatted images are provided for review. Automated exposure control, iterative reconstruction, and/or weight based adjustment of the mA/kV was utilized to reduce the radiation dose to as low as reasonably achievable. COMPARISON: 04/06/2021 HISTORY: ORDERING SYSTEM PROVIDED HISTORY: fall with midline t spine pain TECHNOLOGIST PROVIDED HISTORY: fall with midline t spine pain Reason for Exam: back pain, fall midline t spine pain FINDINGS: BONES/ALIGNMENT: There is normal alignment of the spine. The vertebral body heights are maintained and the posterior elements appear intact. There is chronic appearing ununited fracture through a bridging osteophyte at the right anterolateral aspect of T9-T10. There is partially visualized anterior cervical discectomy and fusion hardware at C7. No osseous destructive lesion is seen on a background of osseous demineralization. Partially visualized median sternotomy. DEGENERATIVE CHANGES: There is mild disc height loss throughout the thoracic spine with bridging osteophytes in the mid to lower thoracic levels. Mild multilevel facet arthrosis. Spinal canal appears grossly patent. SOFT TISSUES: No acute abnormality in the paraspinal soft tissues. Heterogeneous multinodular thyroid with a dominant 1.8 cm hypodense nodule in the right lobe, unchanged from prior. Partially imaged right IJ dialysis catheter terminates in the proximal right atrium. Partially visualized changes from CABG. Dependent opacities in the left lung with a trace left pleural effusion. Heavy small-vessel vascular calcifications throughout likely due to underlying renal disease. No acute fracture of the thoracic spine.  Dependent opacities in the partially imaged left lung which are likely due to atelectasis, though clinical correlation is required to exclude contributory infection. Trace left pleural effusion. Physical Exam:    General appearance - NAD, AOx1  Lungs -CTAB, no R/R/R  Heart - RRR, no M/R/G  Abdomen - Soft, NT/ND  Neurological:  RUE and RLE decreased strength and sensation compared to L  Extremities - Cap refil <2 sec in all ext., no edema  Skin -warm, dry      Hospital Course:  Clinical course has improved, labs and imaging reviewed. Judge Allan originally presented to the hospital on 6/23/2022  4:06 PM with back pain and L arm numbness. Vascular consulted due to ESRD and fistula on that side and recommended outpatient follow up with . Has had multiple revisions on that side. At that time it was determined that She required further observation and pain control due to requiring multiple rounds of medication for back pain despite negative CT T spine. Woke up AM 6/25/22 and nurse notified that pt A&Ox1. Stat CT head and neuro consulted. Pt was taken to dialysis where she saw neuro who noted still moving all extremities but A&Ox1. Pt reevaluated after dialysis and RUE and RLE weakness and decreased sensation, stroke alert called, and neuro responded. Recommended MRI and MRA. Pt transferred to medicine with additional labs pending to r/o other metabolic causes of altered mentation with ammonia and electrolytes today pending. MRI and MRA still pending. Family updated and they noted this has happened in past when pt has come back from dialysis and they attribute to drop in blood pressure. Pt discussed directly with the admitting team    Disposition: Transfer  Condition: Fair    Time Spent: 0 day      --  Jak Marin MD  Emergency Medicine Attending Physician    This dictation was generated by voice recognition computer software.   Although all attempts are made to edit the dictation for accuracy, there may be errors in the transcription that are not intended.

## 2022-06-25 NOTE — PROGRESS NOTES
Dialysis Post Treatment Note  Vitals:    06/25/22 1321   BP: 120/60   Pulse: 76   Resp: 14   Temp: 97.5 °F (36.4 °C)   SpO2: 99%     Pre-Weight = 101.4 kg  Post-weight = Weight: 217 lb 13 oz (98.8 kg)  Total Liters Processed = Total Liters Processed (l/min): 62.8 l/min  Rinseback Volume (mL) = Rinseback Volume (ml): 300 ml  Net Removal (mL) =  1700  Patient's dry weight= 99.5 kg  Type of access used= Tunneled Cath  Length of treatment= 3 hours      Pt tolerated HD tx, pt seen by Neurologist, pt remain drowsy

## 2022-06-25 NOTE — PROGRESS NOTES
PRN   Or  morphine injection 4 mg, Q2H PRN  insulin lispro (HUMALOG) injection vial 0-18 Units, TID WC  insulin lispro (HUMALOG) injection vial 0-9 Units, Nightly  potassium bicarb-citric acid (EFFER-K) effervescent tablet 20 mEq, Daily  glucose chewable tablet 16 g, PRN  dextrose bolus 10% 125 mL, PRN   Or  dextrose bolus 10% 250 mL, PRN  glucagon (rDNA) injection 1 mg, PRN  dextrose 5 % solution, PRN        All medication charted and reviewed. CONSULTS      IP CONSULT TO VASCULAR SURGERY    ASSESSMENT/PLAN       Jessica Marin is a 57 y.o. female , hx ESRD, who presents with complaints of mid back spasm over the past several days and left upper extremity numbness, AV fistula and requesting vascular consult. Vascular consulted who recommended outpatient follow-up and continue using site for hemodialysis. CT nonacute of T-spine in ED. Was placed in observation unit to see PT, OT due to difficulty with ADLs. Normally gets dialysis Tuesday, Thursday, Saturday. CT obtained of head due to alert and oriented x1 and baseline Oriented x3 without any new focal deficit.     · PT, OT, placement  · Vascular consulted, appreciate [id number] outpatient with Leslie Dubose, continue using site for HD  · ESRD- dialysis Tuesday, Thursday, Saturday  · Stat CT head obtained without acute abnormality  · Neurology consult, appreciate recommendations  · Continue home medications and pain control  · Monitor vitals, labs, and imaging  · DISPO: pending consults and clinical improvement     --  Aashish Damon MD  Emergency Medicine Resident Physician     This dictation was generated by voice recognition computer software. Although all attempts are made to edit the dictation for accuracy, there may be errors in the transcription that are not intended.

## 2022-06-25 NOTE — PROGRESS NOTES
Physical Therapy        Physical Therapy Cancel Note      DATE: 2022    NAME: Lyndsay Archer  MRN: [id number]   : 1960      Patient not seen this date for Physical Therapy due to: no PT services received as pt is off unit to receive dialysis.  RN reports pt may not return until after 3:00 PM.           Electronically signed by Odalys Real PTA on 2022 at 12:00 PM

## 2022-06-25 NOTE — FLOWSHEET NOTE
[address] [facility] [address]  PROGRESS NOTE    Shift date: 6/25/2022  Shift day: Saturday   Shift # 2    Room # 1911/0355-75   Name: Madelyn Munoz                Voodoo: Congregation   Place of Yazidi:     Referral: Follow up to earlier stroke call    Admit Date & Time: 6/23/2022  4:06 PM    Assessment: Intervention:  Outcome:          Madelyn Munoz is a 57 y.o. female in the hospital because Rapid Response for Stroke. Upon entering the room writer observes patient exhibiting significant pain in bed, two sisters in room giving emotional support; staff attempting blood draw. Inquired of sisters of their relationship, which they confirmed. As patient was in pain and agitated,  yamilka near and offered to sing the Our Father. Patient accepted. This seemed to calm the patient somewhat.  extended to sisters availability throgh the nurse. Appreciation expressed. Plan:  Chaplains will remain available to offer spiritual and emotional support as needed. Electronically signed by Wander Chaidez on 6/25/2022 at 6:52 PM.  [facility]  [phone]               06/25/22 1757   Encounter Summary   Encounter Overview/Reason  Spiritual/Emotional Needs   Service Provided For: Patient and family together   Referral/Consult From: Nurse   Support System Family members   Last Encounter  06/25/22   Complexity of Encounter Moderate   Begin Time 1730   End Time  1744   Total Time Calculated 14 min   Encounter    Type Follow up   Spiritual/Emotional needs   Type Spiritual Distress; Emotional Distress   Assessment/Intervention/Outcome   Assessment Anxious; Concerns with suffering; Impaired social interaction; Tearful   Intervention Active listening;Empowerment;Grief Care;Nurtured Hope;Prayer (assurance of)/[place];Sustaining Presence/Ministry of presence; Other (Comment)  Sybil Camargo:  Patient accepted singing of \"Our Father\"; )   Outcome Acceptance; Connection/Belonging;Deescalated

## 2022-06-25 NOTE — PROGRESS NOTES
Received call from patient's sisters, Maldonado Fontenot and Poalo Redmond, who were notified by the  of he change in mental status. Writer advised of patient and nurse, Sumaya Pacheco, currently off unit in MRI. Writer messaged the obs resident via [employer] to please call Maldonado Po @ [phone] re: AMS and MRI.

## 2022-06-26 PROBLEM — G93.41 ACUTE METABOLIC ENCEPHALOPATHY: Status: ACTIVE | Noted: 2022-06-18

## 2022-06-26 PROBLEM — I77.0: Status: ACTIVE | Noted: 2022-06-26

## 2022-06-26 LAB
ABSOLUTE EOS #: 0.19 K/UL (ref 0–0.44)
ABSOLUTE IMMATURE GRANULOCYTE: <0.03 K/UL (ref 0–0.3)
ABSOLUTE LYMPH #: 0.93 K/UL (ref 1.1–3.7)
ABSOLUTE MONO #: 0.51 K/UL (ref 0.1–1.2)
ANION GAP SERPL CALCULATED.3IONS-SCNC: 16 MMOL/L (ref 9–17)
BASOPHILS # BLD: 1 % (ref 0–2)
BASOPHILS ABSOLUTE: 0.04 K/UL (ref 0–0.2)
BUN BLDV-MCNC: 13 MG/DL (ref 8–23)
CALCIUM SERPL-MCNC: 8.6 MG/DL (ref 8.6–10.4)
CARBOXYHEMOGLOBIN: 1.7 % (ref 0–5)
CHLORIDE BLD-SCNC: 100 MMOL/L (ref 98–107)
CO2: 25 MMOL/L (ref 20–31)
CREAT SERPL-MCNC: 2.77 MG/DL (ref 0.5–0.9)
EOSINOPHILS RELATIVE PERCENT: 4 % (ref 1–4)
ESTIMATED AVERAGE GLUCOSE: 157 MG/DL
FIO2: ABNORMAL
GFR AFRICAN AMERICAN: 21 ML/MIN
GFR NON-AFRICAN AMERICAN: 17 ML/MIN
GFR SERPL CREATININE-BSD FRML MDRD: ABNORMAL ML/MIN/{1.73_M2}
GLUCOSE BLD-MCNC: 105 MG/DL (ref 65–105)
GLUCOSE BLD-MCNC: 141 MG/DL (ref 65–105)
GLUCOSE BLD-MCNC: 308 MG/DL (ref 65–105)
GLUCOSE BLD-MCNC: 92 MG/DL (ref 70–99)
GLUCOSE BLD-MCNC: 94 MG/DL (ref 65–105)
GLUCOSE BLD-MCNC: 97 MG/DL (ref 65–105)
HBA1C MFR BLD: 7.1 % (ref 4–6)
HCO3 VENOUS: 29 MMOL/L (ref 24–30)
HCT VFR BLD CALC: 35.5 % (ref 36.3–47.1)
HEMOGLOBIN: 11.4 G/DL (ref 11.9–15.1)
IMMATURE GRANULOCYTES: 0 %
LYMPHOCYTES # BLD: 19 % (ref 24–43)
MCH RBC QN AUTO: 31.8 PG (ref 25.2–33.5)
MCHC RBC AUTO-ENTMCNC: 32.1 G/DL (ref 28.4–34.8)
MCV RBC AUTO: 99.2 FL (ref 82.6–102.9)
MONOCYTES # BLD: 10 % (ref 3–12)
NRBC AUTOMATED: 0.4 PER 100 WBC
O2 SAT, VEN: 49.3 % (ref 60–85)
PATIENT TEMP: 37
PCO2, VEN: 48.1 (ref 39–55)
PDW BLD-RTO: 18.3 % (ref 11.8–14.4)
PH VENOUS: 7.4 (ref 7.32–7.42)
PLATELET # BLD: 146 K/UL (ref 138–453)
PMV BLD AUTO: 10.6 FL (ref 8.1–13.5)
PO2, VEN: 28.4 (ref 30–50)
POSITIVE BASE EXCESS, VEN: 3.8 MMOL/L (ref 0–2)
POTASSIUM SERPL-SCNC: 4.1 MMOL/L (ref 3.7–5.3)
RBC # BLD: 3.58 M/UL (ref 3.95–5.11)
RBC # BLD: ABNORMAL 10*6/UL
SEG NEUTROPHILS: 66 % (ref 36–65)
SEGMENTED NEUTROPHILS ABSOLUTE COUNT: 3.28 K/UL (ref 1.5–8.1)
SODIUM BLD-SCNC: 141 MMOL/L (ref 135–144)
WBC # BLD: 5 K/UL (ref 3.5–11.3)

## 2022-06-26 PROCEDURE — 2580000003 HC RX 258: Performed by: EMERGENCY MEDICINE

## 2022-06-26 PROCEDURE — 6370000000 HC RX 637 (ALT 250 FOR IP): Performed by: EMERGENCY MEDICINE

## 2022-06-26 PROCEDURE — 99232 SBSQ HOSP IP/OBS MODERATE 35: CPT | Performed by: INTERNAL MEDICINE

## 2022-06-26 PROCEDURE — 99232 SBSQ HOSP IP/OBS MODERATE 35: CPT | Performed by: PSYCHIATRY & NEUROLOGY

## 2022-06-26 PROCEDURE — 6360000002 HC RX W HCPCS

## 2022-06-26 PROCEDURE — 6370000000 HC RX 637 (ALT 250 FOR IP)

## 2022-06-26 PROCEDURE — 85025 COMPLETE CBC W/AUTO DIFF WBC: CPT

## 2022-06-26 PROCEDURE — 36415 COLL VENOUS BLD VENIPUNCTURE: CPT

## 2022-06-26 PROCEDURE — 6370000000 HC RX 637 (ALT 250 FOR IP): Performed by: GENERAL PRACTICE

## 2022-06-26 PROCEDURE — 82947 ASSAY GLUCOSE BLOOD QUANT: CPT

## 2022-06-26 PROCEDURE — 82805 BLOOD GASES W/O2 SATURATION: CPT

## 2022-06-26 PROCEDURE — 6370000000 HC RX 637 (ALT 250 FOR IP): Performed by: PSYCHIATRY & NEUROLOGY

## 2022-06-26 PROCEDURE — 1200000000 HC SEMI PRIVATE

## 2022-06-26 PROCEDURE — 97110 THERAPEUTIC EXERCISES: CPT

## 2022-06-26 PROCEDURE — 80048 BASIC METABOLIC PNL TOTAL CA: CPT

## 2022-06-26 PROCEDURE — 97535 SELF CARE MNGMENT TRAINING: CPT

## 2022-06-26 PROCEDURE — 97116 GAIT TRAINING THERAPY: CPT

## 2022-06-26 RX ORDER — INSULIN LISPRO 100 [IU]/ML
0-12 INJECTION, SOLUTION INTRAVENOUS; SUBCUTANEOUS
Status: DISCONTINUED | OUTPATIENT
Start: 2022-06-27 | End: 2022-07-01

## 2022-06-26 RX ORDER — CYCLOBENZAPRINE HCL 10 MG
5 TABLET ORAL 3 TIMES DAILY PRN
Status: DISCONTINUED | OUTPATIENT
Start: 2022-06-26 | End: 2022-06-27

## 2022-06-26 RX ORDER — GABAPENTIN 300 MG/1
300 CAPSULE ORAL 2 TIMES DAILY
Status: DISCONTINUED | OUTPATIENT
Start: 2022-06-26 | End: 2022-06-30

## 2022-06-26 RX ORDER — INSULIN LISPRO 100 [IU]/ML
0-6 INJECTION, SOLUTION INTRAVENOUS; SUBCUTANEOUS NIGHTLY
Status: DISCONTINUED | OUTPATIENT
Start: 2022-06-26 | End: 2022-06-27

## 2022-06-26 RX ORDER — MORPHINE SULFATE 2 MG/ML
1 INJECTION, SOLUTION INTRAMUSCULAR; INTRAVENOUS ONCE
Status: DISCONTINUED | OUTPATIENT
Start: 2022-06-26 | End: 2022-06-28

## 2022-06-26 RX ADMIN — BUSPIRONE HYDROCHLORIDE 10 MG: 10 TABLET ORAL at 13:19

## 2022-06-26 RX ADMIN — ATORVASTATIN CALCIUM 80 MG: 80 TABLET, FILM COATED ORAL at 21:07

## 2022-06-26 RX ADMIN — CYCLOBENZAPRINE 5 MG: 10 TABLET, FILM COATED ORAL at 04:42

## 2022-06-26 RX ADMIN — BUSPIRONE HYDROCHLORIDE 10 MG: 10 TABLET ORAL at 21:07

## 2022-06-26 RX ADMIN — HEPARIN SODIUM 5000 UNITS: 5000 INJECTION INTRAVENOUS; SUBCUTANEOUS at 08:22

## 2022-06-26 RX ADMIN — CARVEDILOL 3.12 MG: 3.12 TABLET, FILM COATED ORAL at 08:18

## 2022-06-26 RX ADMIN — RANOLAZINE 1000 MG: 500 TABLET, FILM COATED, EXTENDED RELEASE ORAL at 21:07

## 2022-06-26 RX ADMIN — SODIUM CHLORIDE, PRESERVATIVE FREE 10 ML: 5 INJECTION INTRAVENOUS at 21:07

## 2022-06-26 RX ADMIN — FUROSEMIDE 80 MG: 40 TABLET ORAL at 22:05

## 2022-06-26 RX ADMIN — POTASSIUM BICARBONATE 20 MEQ: 782 TABLET, EFFERVESCENT ORAL at 08:20

## 2022-06-26 RX ADMIN — BUSPIRONE HYDROCHLORIDE 10 MG: 10 TABLET ORAL at 08:22

## 2022-06-26 RX ADMIN — SODIUM BICARBONATE 650 MG: 648 TABLET ORAL at 13:19

## 2022-06-26 RX ADMIN — HEPARIN SODIUM 5000 UNITS: 5000 INJECTION INTRAVENOUS; SUBCUTANEOUS at 21:07

## 2022-06-26 RX ADMIN — INSULIN GLARGINE 50 UNITS: 100 INJECTION, SOLUTION SUBCUTANEOUS at 21:19

## 2022-06-26 RX ADMIN — PANTOPRAZOLE SODIUM 40 MG: 40 TABLET, DELAYED RELEASE ORAL at 06:30

## 2022-06-26 RX ADMIN — CYCLOBENZAPRINE 5 MG: 10 TABLET, FILM COATED ORAL at 12:51

## 2022-06-26 RX ADMIN — SODIUM CHLORIDE, PRESERVATIVE FREE 10 ML: 5 INJECTION INTRAVENOUS at 09:23

## 2022-06-26 RX ADMIN — TRAZODONE HYDROCHLORIDE 50 MG: 50 TABLET ORAL at 21:07

## 2022-06-26 RX ADMIN — RANOLAZINE 1000 MG: 500 TABLET, FILM COATED, EXTENDED RELEASE ORAL at 08:18

## 2022-06-26 RX ADMIN — SODIUM BICARBONATE 650 MG: 648 TABLET ORAL at 22:05

## 2022-06-26 RX ADMIN — FUROSEMIDE 80 MG: 40 TABLET ORAL at 11:38

## 2022-06-26 RX ADMIN — TAMSULOSIN HYDROCHLORIDE 0.4 MG: 0.4 CAPSULE ORAL at 11:38

## 2022-06-26 RX ADMIN — CARVEDILOL 3.12 MG: 3.12 TABLET, FILM COATED ORAL at 22:05

## 2022-06-26 RX ADMIN — SODIUM BICARBONATE 650 MG: 648 TABLET ORAL at 09:22

## 2022-06-26 RX ADMIN — CYCLOBENZAPRINE 5 MG: 10 TABLET, FILM COATED ORAL at 21:07

## 2022-06-26 RX ADMIN — INSULIN LISPRO 4 UNITS: 100 INJECTION, SOLUTION INTRAVENOUS; SUBCUTANEOUS at 21:19

## 2022-06-26 RX ADMIN — DOCUSATE SODIUM 100 MG: 100 CAPSULE ORAL at 08:20

## 2022-06-26 RX ADMIN — ASPIRIN 81 MG: 81 TABLET, CHEWABLE ORAL at 08:20

## 2022-06-26 RX ADMIN — GABAPENTIN 300 MG: 300 CAPSULE ORAL at 09:22

## 2022-06-26 ASSESSMENT — ENCOUNTER SYMPTOMS
COUGH: 0
SHORTNESS OF BREATH: 0
DIARRHEA: 0
ABDOMINAL PAIN: 0
BACK PAIN: 1
WHEEZING: 0
SORE THROAT: 0
BLOOD IN STOOL: 0
NAUSEA: 0
VOMITING: 0

## 2022-06-26 ASSESSMENT — PAIN SCALES - GENERAL
PAINLEVEL_OUTOF10: 10
PAINLEVEL_OUTOF10: 10
PAINLEVEL_OUTOF10: 5
PAINLEVEL_OUTOF10: 10

## 2022-06-26 ASSESSMENT — PAIN DESCRIPTION - LOCATION
LOCATION: BACK
LOCATION: BACK

## 2022-06-26 ASSESSMENT — PAIN DESCRIPTION - ORIENTATION: ORIENTATION: LOWER

## 2022-06-26 NOTE — PROCEDURES
Date: 6/25/2022  Referring physician: Dr. Brice Daily    Indication  Patient aged 57 y with encephalopathy. EEG done to assess for epileptiform activity. Introduction  This routine 25-minute EEG was recorded using the International 10-20 System on a [employer] workstation at 256 samples/s. Automated spike and seizure detection algorithms were applied. Description  During the maximal alert state, a well-regulated, symmetric, and reactive 7 Hz posterior dominant rhythm was seen. No consistent focal slowing or interhemispheric asymmetry was noted. Stage I and stage II sleep were not observed. There were no interictal epileptiform discharges or electrographic seizures. Activations  Hyperventilation was not performed. Intermittent photic stimulation was performed and demonstrated no posterior driving response. Impression  Abnormal awake EEG. The slowing mentioned above suggests mild non specific encephalopathy. EKG lead did not show clear arrhythmia, if still in concern consider formal EKG or correlation with telemetry. No epileptiform discharges were identified. Please note the absence of such activity on this record cannot conclusively rule out an epileptic disorder. If such is still clinically suspected, a repeat study with sleep deprivation and/or prolonged sampling may be helpful. Aba Rodriguez MD  Epilepsy Board Certified. Neurology Board Certified.     Electronically Signed

## 2022-06-26 NOTE — PROGRESS NOTES
[facility]   [address] [place] [address]    Resident Progress Note             Date:   6/26/2022  Patient name:  Jonn Marsh  Date of admission:  6/23/2022  4:06 PM  MRN:   [id number]  Account:  [de-identified]  YOB: 1960  PCP:    AFSANEH Foley CNP  Room:   [address] [place] [address]  Code Status:    Full Code      Interval History:     Admitted 6/23/2022. Today is day 3 hospitalization   The patient was seen and examined today and the chart was reviewed. Patient was tearful and crying just upon entering the room with the attending physician and saying she has back pain and spasm and asked for IV or oral morphine. She was awake, alert and oriented. She was seen recently by our neurology service with same complaints. Brief History:      The patient is a 57 y.o. female who presents with Back Pain (upper, Vevelyn Huh last week)   and she is admitted to the hospital on 6/23/2022 for the management of muscle spasm of the back. Patient was seen earlier this afternoon as a neurology consult for left upper extremity numbness and confusion. Patient was moving all extremities, was only oriented to self and unable to follow commands at the time. EEG along with some lab work was ordered. Patient was undergoing hemodialysis at the time of evaluation. Patient had an episode of hypotension during dialysis and received midodrine. Once patient returned to the floor after dialysis, nursing staff noticed that the patient had weakness on the right side, primary was notified and subsequently stroke alert was called. Upon stroke evaluation patient has diffuse weakness of the right upper and lower extremity along with right-sided numbness, does appear more oriented right now. Patient has an NIH of 8, last known well is unclear but sometime around 12:30 PM when he was seen by neurology attending.   Patient was able to answer questions and stated that this was her baseline and this weakness was new. Case was discussed with stroke attending, decision to proceed with MRI brain and MRA head and neck without contrast stat. Patient did not have any cerebrovascular imaging since November 2021. Patient was emergently taken for MRI brain. Prior to arrival patient was on  Antiplatelets/anticoagulants: Aspirin 81 mg daily  Statins: Lipitor 80 mg nightly     Subjective:     Back pain and spasm. ROS  Constitutional: no fever, chills, fatigue  HENT: No change in vision or hearing   Respiratory: No cough, SOB, wheezing. Cardiovascular:  No chest pain, palpitations, leg swelling. Gastrointestinal: No nausea, vomiting, diarrhea. Genitourinary: No increased frequency, urgency. Musculoskeletal: No myalgia or arthralgia. Positive back pain  Skin: No rashes or scarring or bruises. Neurological: No headache, paresthesia, or focal weakness. Endo/Heme/Allergies: Negative for itchy eyes or runny nose. Psychiatric/Behavioral: No anxiety or depressed mood.         [START ON 6/27/2022] insulin lispro  0-12 Units SubCUTAneous TID AC    insulin lispro  0-6 Units SubCUTAneous Nightly    [Held by provider] gabapentin  300 mg Oral BID    traZODone  50 mg Oral Nightly    sodium bicarbonate  650 mg Oral TID    atorvastatin  80 mg Oral Nightly    heparin (porcine)  5,000 Units SubCUTAneous BID    aspirin  81 mg Oral Daily    busPIRone  10 mg Oral TID    carvedilol  3.125 mg Oral BID    docusate sodium  100 mg Oral BID    furosemide  80 mg Oral BID    insulin glargine  50 Units SubCUTAneous BID    pantoprazole  40 mg Oral QAM AC    ranolazine  1,000 mg Oral BID    tamsulosin  0.4 mg Oral Daily    sodium chloride flush  5-40 mL IntraVENous 2 times per day    potassium bicarb-citric acid  20 mEq Oral Daily       Past Medical History:   Diagnosis Date    Arthritis     Backache, unspecified     Cerebral artery occlusion with cerebral infarction [address] [address]     CHF (congestive heart failure) ([address]  Chronic kidney disease     Coronary atherosclerosis of artery bypass graft     COVID 1/31/2022    Cramp of limb     Gallstones     Hemodialysis [facility] [address]     Hyperlipidemia     Hypertension     Insomnia     Neuromuscular disorder ([facility] [address]     Pneumonia     Psychiatric problem     Thyroid disease     Type II or unspecified type diabetes mellitus with renal manifestations, not stated as uncontrolled(250.40)     Type II or unspecified type diabetes mellitus without mention of complication, not stated as uncontrolled     Unspecified vitamin D deficiency        Past Surgical History:   Procedure Laterality Date    ANKLE FRACTURE SURGERY      AV FISTULA CREATION  12/14/2021    BACK SURGERY      BREAST SURGERY      CARDIAC SURGERY      CARPAL TUNNEL RELEASE      CHOLECYSTECTOMY, OPEN N/A     COLONOSCOPY      CORONARY ARTERY BYPASS GRAFT      x3    DIALYSIS FISTULA CREATION Left 12/14/2021    LEFT AV FISTULA CREATION UPPER EXTREMITY performed by Janice Rodriguez MD at [facility]      HAND SURGERY      IR TUNNELED CATHETER PLACEMENT GREATER THAN 5 YEARS  8/18/2021    IR TUNNELED CATHETER PLACEMENT GREATER THAN 5 YEARS 8/18/2021 [facility]    KNEE ARTHROSCOPY      right    OTHER SURGICAL HISTORY  04/06/2022    cvc exchange    TONSILLECTOMY         Objective:     PHYSICAL EXAM:      Blood pressure (!) 143/70, pulse 74, temperature 97.8 °F (36.6 °C), temperature source Oral, resp. rate 18, height 5' 5\" (1.651 m), weight 217 lb 13 oz (98.8 kg), SpO2 100 %. General Examination    General Resting comfortably in bed   Head Normocephalic, without obvious abnormality   Neck Supple, symmetrical. Good ROM. No midline or paraspinal tenderness. Lungs Respirations unlabored, no wheezing   Chest Wall No deformity   Heart RRR, no murmur   Abdomen Soft. Non-tender, non-distended   Extremities No cyanosis or edema or warmth.    Pulses 2+ and symmetric   Skin: Skin  turgor normal, no rashes or lesions         Mental status  Speech Alert. Oriented to person, place, and time. Speech is fluent without paraphasic errors  Good repetition and naming  Can do 1 step, 2 step, and cross-body commands  Can spell world backwards. Language appropriate. No hallucinations or delusions. No SI/HI. Cranial nerves   II - VFF, visual threat intact  III, IV, VI - extra-ocular muscles full. No nystagmus. Pupils symmetric and responsive.    V - sensation symmetric         VII -  No facial droop or asymmetric NLF  VIII - intact hearing to conversational tone          IX, X - symmetrical palate elevation   XI - 5/5 strength symmetric  XII - tongue midline   Motor function  Strength: grossly 5/5 in b/l              Deltoid, biceps, triceps, wrist flexion, wrist extension             Hip flexion/extension, knee flexion/extension, plantar flexion  Bulk: grossly normal no atrophy  Tone: symmetric b/l arms and legs  Abnormal movements: No abnormal movements or tremor   Sensory function Symmetric to touch in all extremities bilaterally   Cerebellar No dysmetria or dysdiadochocinesia    Reflex function DTR:        2+ b/l symmetric in biceps, brachioradialis, patellar, calcaneal  Babinski b/l plantar downgoing   Gait                  Not assessed       Investigations:      Laboratory Testing:  Recent Results (from the past 24 hour(s))   POC Glucose Fingerstick    Collection Time: 06/25/22  2:50 PM   Result Value Ref Range    POC Glucose 143 (H) 65 - 105 mg/dL   Comprehensive Metabolic Panel    Collection Time: 06/25/22  8:05 PM   Result Value Ref Range    Glucose 171 (H) 70 - 99 mg/dL    BUN 12 8 - 23 mg/dL    CREATININE 2.13 (H) 0.50 - 0.90 mg/dL    Calcium 8.1 (L) 8.6 - 10.4 mg/dL    Sodium 135 135 - 144 mmol/L    Potassium 3.4 (L) 3.7 - 5.3 mmol/L    Chloride 98 98 - 107 mmol/L    CO2 25 20 - 31 mmol/L    Anion Gap 12 9 - 17 mmol/L    Alkaline Phosphatase 88 35 - 104 U/L ALT 12 5 - 33 U/L    AST 23 <32 U/L    Total Bilirubin 0.97 0.3 - 1.2 mg/dL    Total Protein 6.6 6.4 - 8.3 g/dL    Albumin 3.2 (L) 3.5 - 5.2 g/dL    Albumin/Globulin Ratio 0.9 (L) 1.0 - 2.5    GFR Non-African American 23 (L) >60 mL/min    GFR  28 (L) >60 mL/min    GFR Comment         CBC with Auto Differential    Collection Time: 06/25/22  8:05 PM   Result Value Ref Range    WBC 5.6 3.5 - 11.3 k/uL    RBC 3.44 (L) 3.95 - 5.11 m/uL    Hemoglobin 11.2 (L) 11.9 - 15.1 g/dL    Hematocrit 33.0 (L) 36.3 - 47.1 %    MCV 95.9 82.6 - 102.9 fL    MCH 32.6 25.2 - 33.5 pg    MCHC 33.9 28.4 - 34.8 g/dL    RDW 18.0 (H) 11.8 - 14.4 %    Platelets 595 (L) 915 - 453 k/uL    MPV 10.8 8.1 - 13.5 fL    NRBC Automated 0.4 (H) 0.0 per 100 WBC    Seg Neutrophils 69 (H) 36 - 65 %    Lymphocytes 17 (L) 24 - 43 %    Monocytes 10 3 - 12 %    Eosinophils % 2 1 - 4 %    Basophils 1 0 - 2 %    Immature Granulocytes 1 (H) 0 %    Segs Absolute 3.90 1.50 - 8.10 k/uL    Absolute Lymph # 0.98 (L) 1.10 - 3.70 k/uL    Absolute Mono # 0.57 0.10 - 1.20 k/uL    Absolute Eos # 0.13 0.00 - 0.44 k/uL    Basophils Absolute 0.03 0.00 - 0.20 k/uL    Absolute Immature Granulocyte 0.03 0.00 - 0.30 k/uL    RBC Morphology ANISOCYTOSIS PRESENT    Ammonia    Collection Time: 06/25/22  8:05 PM   Result Value Ref Range    Ammonia 38 11 - 51 umol/L   Hemoglobin A1C    Collection Time: 06/25/22  8:05 PM   Result Value Ref Range    Hemoglobin A1C 7.1 (H) 4.0 - 6.0 %    Estimated Avg Glucose 157 mg/dL   POC Glucose Fingerstick    Collection Time: 06/25/22  8:53 PM   Result Value Ref Range    POC Glucose 154 (H) 65 - 105 mg/dL   POC Glucose Fingerstick    Collection Time: 06/26/22  1:20 AM   Result Value Ref Range    POC Glucose 105 65 - 105 mg/dL   POC Glucose Fingerstick    Collection Time: 06/26/22  3:58 AM   Result Value Ref Range    POC Glucose 94 65 - 105 mg/dL   CBC with Auto Differential    Collection Time: 06/26/22  6:27 AM   Result Value Ref Range WBC 5.0 3.5 - 11.3 k/uL    RBC 3.58 (L) 3.95 - 5.11 m/uL    Hemoglobin 11.4 (L) 11.9 - 15.1 g/dL    Hematocrit 35.5 (L) 36.3 - 47.1 %    MCV 99.2 82.6 - 102.9 fL    MCH 31.8 25.2 - 33.5 pg    MCHC 32.1 28.4 - 34.8 g/dL    RDW 18.3 (H) 11.8 - 14.4 %    Platelets 624 206 - 883 k/uL    MPV 10.6 8.1 - 13.5 fL    NRBC Automated 0.4 (H) 0.0 per 100 WBC    Seg Neutrophils 66 (H) 36 - 65 %    Lymphocytes 19 (L) 24 - 43 %    Monocytes 10 3 - 12 %    Eosinophils % 4 1 - 4 %    Basophils 1 0 - 2 %    Immature Granulocytes 0 0 %    Segs Absolute 3.28 1.50 - 8.10 k/uL    Absolute Lymph # 0.93 (L) 1.10 - 3.70 k/uL    Absolute Mono # 0.51 0.10 - 1.20 k/uL    Absolute Eos # 0.19 0.00 - 0.44 k/uL    Basophils Absolute 0.04 0.00 - 0.20 k/uL    Absolute Immature Granulocyte <0.03 0.00 - 0.30 k/uL    RBC Morphology ANISOCYTOSIS PRESENT    Basic Metabolic Panel w/ Reflex to MG    Collection Time: 06/26/22  6:27 AM   Result Value Ref Range    Glucose 92 70 - 99 mg/dL    BUN 13 8 - 23 mg/dL    CREATININE 2.77 (H) 0.50 - 0.90 mg/dL    Calcium 8.6 8.6 - 10.4 mg/dL    Sodium 141 135 - 144 mmol/L    Potassium 4.1 3.7 - 5.3 mmol/L    Chloride 100 98 - 107 mmol/L    CO2 25 20 - 31 mmol/L    Anion Gap 16 9 - 17 mmol/L    GFR Non-African American 17 (L) >60 mL/min    GFR  21 (L) >60 mL/min    GFR Comment         POC Glucose Fingerstick    Collection Time: 06/26/22  6:33 AM   Result Value Ref Range    POC Glucose 97 65 - 105 mg/dL   POC Glucose Fingerstick    Collection Time: 06/26/22 11:43 AM   Result Value Ref Range    POC Glucose 141 (H) 65 - 105 mg/dL       Imaging/Diagnostics:  CT ABDOMEN PELVIS WO CONTRAST Additional Contrast? None    Result Date: 6/10/2022  EXAMINATION: CT OF THE ABDOMEN AND PELVIS WITHOUT CONTRAST 6/10/2022 4:23 pm TECHNIQUE: CT of the abdomen and pelvis was performed without the administration of intravenous contrast. Multiplanar reformatted images are provided for review.  Automated exposure control, iterative reconstruction, and/or weight based adjustment of the mA/kV was utilized to reduce the radiation dose to as low as reasonably achievable. COMPARISON: None. HISTORY: ORDERING SYSTEM PROVIDED HISTORY: flank pain, UTI TECHNOLOGIST PROVIDED HISTORY: flank pain, UTI Reason for Exam: flank pain, UTI, nausea Relevant Medical/Surgical History: dialysis, cholecystectomy FINDINGS: Lower Chest: The heart is enlarged. The visualized lung bases are grossly clear. A hemodialysis catheter ends in the upper right atrium. Trace left pleural effusion. Organs: The spleen is mildly enlarged and measures 14.8 cm in length, although is homogeneous in appearance. The liver has a slightly nodular surface contour which suggests cirrhosis. The gallbladder is surgically absent. The pancreas, kidneys, and adrenal glands have an unremarkable unenhanced CT appearance. Extensive renal arterial vascular calcifications. No renal cortical edema or perinephric inflammatory fat stranding. No renal, ureteral, or bladder calculi. No hydronephrosis or hydroureter. GI/Bowel: The stomach and the small and large bowel loops are normal in caliber, contour, and morphology, without acute or significant abnormality. No dilated loops or areas of bowel wall thickening. The appendix is normal. Peritoneum/Retroperitoneum: There is no free fluid or extraluminal gas. No enlarged or suspicious mesenteric or retroperitoneal lymphadenopathy. The abdominal aorta and iliac arteries are normal in caliber. Pelvis: No pelvic free fluid or enlarged or suspicious pelvic or inguinal lymphadenopathy. No appreciable uterine or adnexal abnormality. Mild diffuse urinary bladder wall thickening as well as a mild degree of surrounding inflammatory fat stranding, findings suggestive of acute cystitis. The pelvic bowel loops are unremarkable. Bones/Soft Tissues: Degenerative and post-surgical changes are present within the lumbar spine. No acute fracture.      Mild diffuse urinary bladder wall thickening with surrounding inflammation, findings most likely representing acute cystitis. No findings of acute pyelonephritis. Splenomegaly. Suspected cirrhotic liver morphology. No ascites fluid. Trace left pleural effusion. XR TIBIA FIBULA LEFT (2 VIEWS)    Result Date: 6/18/2022  EXAMINATION: 2 XRAY VIEWS OF THE LEFT TIBIA AND FIBULA; THREE XRAY VIEWS OF THE LEFT ANKLE 6/18/2022 7:47 pm COMPARISON: None. HISTORY: ORDERING SYSTEM PROVIDED HISTORY: Fall, ankle pain TECHNOLOGIST PROVIDED HISTORY: Fall, ankle pain FINDINGS: There is no evidence of acute fracture. There is normal alignment. No acute joint abnormality. No focal osseous lesion. Swelling about the ankle. Moderate tibiotalar osteoarthritis. Mild widening of the medial clear space. No acute osseous abnormality. Mild widening of the medial clear space of the ankle may reflect ligamentous injury. XR ANKLE LEFT (MIN 3 VIEWS)    Result Date: 6/18/2022  EXAMINATION: 2 XRAY VIEWS OF THE LEFT TIBIA AND FIBULA; THREE XRAY VIEWS OF THE LEFT ANKLE 6/18/2022 7:47 pm COMPARISON: None. HISTORY: ORDERING SYSTEM PROVIDED HISTORY: Fall, ankle pain TECHNOLOGIST PROVIDED HISTORY: Fall, ankle pain FINDINGS: There is no evidence of acute fracture. There is normal alignment. No acute joint abnormality. No focal osseous lesion. Swelling about the ankle. Moderate tibiotalar osteoarthritis. Mild widening of the medial clear space. No acute osseous abnormality. Mild widening of the medial clear space of the ankle may reflect ligamentous injury. CT HEAD WO CONTRAST    Result Date: 6/25/2022  EXAMINATION: CT OF THE HEAD WITHOUT CONTRAST  6/25/2022 9:23 am TECHNIQUE: CT of the head was performed without the administration of intravenous contrast. Automated exposure control, iterative reconstruction, and/or weight based adjustment of the mA/kV was utilized to reduce the radiation dose to as low as reasonably achievable. COMPARISON: 18 June 2022 HISTORY: ORDERING SYSTEM PROVIDED HISTORY: only oriented to place, numbness LUE unchanged and has L AV fistula, baseline oriented and alert x 3 TECHNOLOGIST PROVIDED HISTORY: only oriented to place, numbness LUE unchanged and has L AV fistula, baseline oriented and alert x 3 FINDINGS: BRAIN/VENTRICLES: There is no acute intracranial hemorrhage, mass effect or midline shift. No abnormal extra-axial fluid collection. The gray-white differentiation is maintained without evidence of an acute infarct. There is no evidence of hydrocephalus. Heavily calcified vertebral and cavernous carotid arteries for age noted. Scattered white matter hypodensity consistent with chronic microvascular changes noted. ORBITS: The visualized portion of the orbits demonstrate no acute abnormality. SINUSES: The visualized paranasal sinuses and mastoid air cells demonstrate no acute abnormality. SOFT TISSUES/SKULL:  No acute abnormality of the visualized skull or soft tissues. No acute intracranial abnormality. Senescent changes including cortical atrophy, atherosclerotic disease of the major intracranial vessels and chronic white matter changes. There is no significant change from prior exam.     CT HEAD WO CONTRAST    Result Date: 6/18/2022  EXAMINATION: CT OF THE HEAD WITHOUT CONTRAST  6/18/2022 1:20 pm TECHNIQUE: CT of the head was performed without the administration of intravenous contrast. Automated exposure control, iterative reconstruction, and/or weight based adjustment of the mA/kV was utilized to reduce the radiation dose to as low as reasonably achievable.  COMPARISON: 12/24/2021 HISTORY: ORDERING SYSTEM PROVIDED HISTORY: Confusion after dialysis, syncope TECHNOLOGIST PROVIDED HISTORY: Confusion after dialysis, syncope Decision Support Exception - unselect if not a suspected or confirmed emergency medical condition->Emergency Medical Condition (MA) Reason for Exam: Confusion after dialysis, syncope narrowing. C6-C7: Changes related to mature instrumented anterior fusion. No canal or foraminal narrowing. C7-T1: Unremarkable. SOFT TISSUES: There is no prevertebral soft tissue swelling. Right central line is partially visualized. No acute abnormality of the cervical spine. No fracture. Changes related to mature instrumented anterior fusion of C5-C6 and C6-C7. At C4-C5, moderate bilateral neural foraminal narrowing. At C5-C6, severe bilateral neural foraminal narrowing. RECOMMENDATIONS: Unavailable     CT THORACIC SPINE WO CONTRAST    Result Date: 6/23/2022  EXAMINATION: CT OF THE THORACIC SPINE WITHOUT CONTRAST  6/23/2022 2:14 pm: TECHNIQUE: CT of the thoracic spine was performed without the administration of intravenous contrast. Multiplanar reformatted images are provided for review. Automated exposure control, iterative reconstruction, and/or weight based adjustment of the mA/kV was utilized to reduce the radiation dose to as low as reasonably achievable. COMPARISON: 04/06/2021 HISTORY: ORDERING SYSTEM PROVIDED HISTORY: fall with midline t spine pain TECHNOLOGIST PROVIDED HISTORY: fall with midline t spine pain Reason for Exam: back pain, fall midline t spine pain FINDINGS: BONES/ALIGNMENT: There is normal alignment of the spine. The vertebral body heights are maintained and the posterior elements appear intact. There is chronic appearing ununited fracture through a bridging osteophyte at the right anterolateral aspect of T9-T10. There is partially visualized anterior cervical discectomy and fusion hardware at C7. No osseous destructive lesion is seen on a background of osseous demineralization. Partially visualized median sternotomy. DEGENERATIVE CHANGES: There is mild disc height loss throughout the thoracic spine with bridging osteophytes in the mid to lower thoracic levels. Mild multilevel facet arthrosis. Spinal canal appears grossly patent.  SOFT TISSUES: No acute abnormality in the paraspinal soft tissues. Heterogeneous multinodular thyroid with a dominant 1.8 cm hypodense nodule in the right lobe, unchanged from prior. Partially imaged right IJ dialysis catheter terminates in the proximal right atrium. Partially visualized changes from CABG. Dependent opacities in the left lung with a trace left pleural effusion. Heavy small-vessel vascular calcifications throughout likely due to underlying renal disease. No acute fracture of the thoracic spine. Dependent opacities in the partially imaged left lung which are likely due to atelectasis, though clinical correlation is required to exclude contributory infection. Trace left pleural effusion. MRA HEAD WO CONTRAST    Result Date: 6/25/2022  EXAMINATION: MRI OF THE BRAIN WITHOUT CONTRAST; MRA OF THE HEAD WITHOUT CONTRAST; MRA OF THE NECK WITHOUT CONTRAST  6/25/2022 3:30 pm TECHNIQUE: Multiplanar multisequence MRI of the brain was performed without the administration of intravenous contrast.; MRA of the head was performed utilizing time-of-flight imaging with MIP images. No intravenous contrast was administered.; Multiplanar multisequence MRA of the neck was performed without the administration of intravenous contrast. Stenosis of the internal carotid arteries measured using NASCET criteria. COMPARISON: None. HISTORY: ORDERING SYSTEM PROVIDED HISTORY: Right sided weakness TECHNOLOGIST PROVIDED HISTORY: Right sided weakness Reason for Exam: Right sided weakness FINDINGS: MRI BRAIN: There is no acute infarction, intracranial hemorrhage, or intracranial mass lesion. No mass effect, midline shift, or extra-axial collection is noted. There are mild nonspecific foci of periventricular and subcortical cerebral white matter T2/FLAIR hyperintensity, most likely representing chronic microangiopathic disease in this age group. The brain parenchyma is otherwise normal. The pituitary gland is normal in appearance.  The cerebellar tonsils are in normal position. The ventricles, sulci, and cisterns are prominent suggestive of generalized volume loss. The intracranial flow voids are preserved. The globes and orbits are within normal limits. Mucosal retention cyst within the right sphenoid sinus resulting in its mild opacification. The visualized extracranial structures including paranasal sinuses and mastoid air cells are unremarkable. MRA HEAD: ANTERIOR CIRCULATION: No significant stenosis of the intracranial internal carotid, anterior cerebral, or middle cerebral arteries. POSTERIOR CIRCULATION: No significant stenosis of the vertebral, basilar, or posterior cerebral arteries. ANEURYSM: No intracranial aneurysm is seen. MRA neck: AORTIC ARCH/ARCH VESSELS: No dissection or arterial injury. No significant stenosis of the brachiocephalic or subclavian arteries. CAROTID ARTERIES: No dissection, arterial injury, or hemodynamically significant stenosis by NASCET criteria. VERTEBRAL ARTERIES: No dissection, arterial injury, or significant stenosis. Brain MRI: There is no acute infarction, intracranial hemorrhage, or intracranial mass lesion. Mild chronic microangiopathic ischemic disease. Mild generalized volume loss. MRA head: No hemodynamically significant stenosis. No aneurysm. MRA neck: No hemodynamically significant stenosis RECOMMENDATIONS: Unavailable     XR CHEST PORTABLE    Result Date: 6/18/2022  EXAMINATION: ONE XRAY VIEW OF THE CHEST 6/18/2022 12:59 pm COMPARISON: 04/26/2022 HISTORY: ORDERING SYSTEM PROVIDED HISTORY: Syncope, recent dialysis TECHNOLOGIST PROVIDED HISTORY: Syncope, recent dialysis FINDINGS: There is a rightcentral venous catheter with the tip in the right atrium. Cardiac size is enlarged. No acute infiltrates are seen . The pulmonary vascularity is hazy and indistinct. No pneumothorax. No pleural effusions identified . Postsurgical changes overlying the mediastinum and cervical spine.      Cardiomegaly with associated mild pulmonary vascular congestion     MRA NECK WO CONTRAST    Result Date: 6/25/2022  EXAMINATION: MRI OF THE BRAIN WITHOUT CONTRAST; MRA OF THE HEAD WITHOUT CONTRAST; MRA OF THE NECK WITHOUT CONTRAST  6/25/2022 3:30 pm TECHNIQUE: Multiplanar multisequence MRI of the brain was performed without the administration of intravenous contrast.; MRA of the head was performed utilizing time-of-flight imaging with MIP images. No intravenous contrast was administered.; Multiplanar multisequence MRA of the neck was performed without the administration of intravenous contrast. Stenosis of the internal carotid arteries measured using NASCET criteria. COMPARISON: None. HISTORY: ORDERING SYSTEM PROVIDED HISTORY: Right sided weakness TECHNOLOGIST PROVIDED HISTORY: Right sided weakness Reason for Exam: Right sided weakness FINDINGS: MRI BRAIN: There is no acute infarction, intracranial hemorrhage, or intracranial mass lesion. No mass effect, midline shift, or extra-axial collection is noted. There are mild nonspecific foci of periventricular and subcortical cerebral white matter T2/FLAIR hyperintensity, most likely representing chronic microangiopathic disease in this age group. The brain parenchyma is otherwise normal. The pituitary gland is normal in appearance. The cerebellar tonsils are in normal position. The ventricles, sulci, and cisterns are prominent suggestive of generalized volume loss. The intracranial flow voids are preserved. The globes and orbits are within normal limits. Mucosal retention cyst within the right sphenoid sinus resulting in its mild opacification. The visualized extracranial structures including paranasal sinuses and mastoid air cells are unremarkable. MRA HEAD: ANTERIOR CIRCULATION: No significant stenosis of the intracranial internal carotid, anterior cerebral, or middle cerebral arteries. POSTERIOR CIRCULATION: No significant stenosis of the vertebral, basilar, or posterior cerebral arteries.  ANEURYSM: No intracranial aneurysm is seen. MRA neck: AORTIC ARCH/ARCH VESSELS: No dissection or arterial injury. No significant stenosis of the brachiocephalic or subclavian arteries. CAROTID ARTERIES: No dissection, arterial injury, or hemodynamically significant stenosis by NASCET criteria. VERTEBRAL ARTERIES: No dissection, arterial injury, or significant stenosis. Brain MRI: There is no acute infarction, intracranial hemorrhage, or intracranial mass lesion. Mild chronic microangiopathic ischemic disease. Mild generalized volume loss. MRA head: No hemodynamically significant stenosis. No aneurysm. MRA neck: No hemodynamically significant stenosis RECOMMENDATIONS: Unavailable     MRI BRAIN WO CONTRAST    Result Date: 6/25/2022  EXAMINATION: MRI OF THE BRAIN WITHOUT CONTRAST; MRA OF THE HEAD WITHOUT CONTRAST; MRA OF THE NECK WITHOUT CONTRAST  6/25/2022 3:30 pm TECHNIQUE: Multiplanar multisequence MRI of the brain was performed without the administration of intravenous contrast.; MRA of the head was performed utilizing time-of-flight imaging with MIP images. No intravenous contrast was administered.; Multiplanar multisequence MRA of the neck was performed without the administration of intravenous contrast. Stenosis of the internal carotid arteries measured using NASCET criteria. COMPARISON: None. HISTORY: ORDERING SYSTEM PROVIDED HISTORY: Right sided weakness TECHNOLOGIST PROVIDED HISTORY: Right sided weakness Reason for Exam: Right sided weakness FINDINGS: MRI BRAIN: There is no acute infarction, intracranial hemorrhage, or intracranial mass lesion. No mass effect, midline shift, or extra-axial collection is noted. There are mild nonspecific foci of periventricular and subcortical cerebral white matter T2/FLAIR hyperintensity, most likely representing chronic microangiopathic disease in this age group. The brain parenchyma is otherwise normal. The pituitary gland is normal in appearance.  The cerebellar tonsils are in normal position. The ventricles, sulci, and cisterns are prominent suggestive of generalized volume loss. The intracranial flow voids are preserved. The globes and orbits are within normal limits. Mucosal retention cyst within the right sphenoid sinus resulting in its mild opacification. The visualized extracranial structures including paranasal sinuses and mastoid air cells are unremarkable. MRA HEAD: ANTERIOR CIRCULATION: No significant stenosis of the intracranial internal carotid, anterior cerebral, or middle cerebral arteries. POSTERIOR CIRCULATION: No significant stenosis of the vertebral, basilar, or posterior cerebral arteries. ANEURYSM: No intracranial aneurysm is seen. MRA neck: AORTIC ARCH/ARCH VESSELS: No dissection or arterial injury. No significant stenosis of the brachiocephalic or subclavian arteries. CAROTID ARTERIES: No dissection, arterial injury, or hemodynamically significant stenosis by NASCET criteria. VERTEBRAL ARTERIES: No dissection, arterial injury, or significant stenosis. Brain MRI: There is no acute infarction, intracranial hemorrhage, or intracranial mass lesion. Mild chronic microangiopathic ischemic disease. Mild generalized volume loss. MRA head: No hemodynamically significant stenosis. No aneurysm.  MRA neck: No hemodynamically significant stenosis RECOMMENDATIONS: Unavailable       Assessment & Differential Dx:      Primary Problem  Dialysis disequilibrium syndrome    Active Hospital Problems    Diagnosis Date Noted    Altered mental status [R41.82] 06/25/2022     Priority: Medium    Muscle spasm [M62.838]      Priority: Medium    Spasm of muscle [M62.838] 06/23/2022     Priority: Medium    Low back pain [M54.50] 06/23/2022     Priority: Medium    Dialysis disequilibrium syndrome [E87.8] 06/18/2022     Priority: Medium    ESRD (end stage renal disease) ([facility] [address] [facility] [N18.6] 02/14/2022    Obesity, Class II, BMI 35-39.9 [E66.9] 04/07/2021    History of coronary artery bypass graft [Z95.1] 03/31/2021    Iron deficiency anemia [D50.9] 08/31/2020    Diabetic polyneuropathy associated with type 2 diabetes mellitus ([facility] [E11.42] 02/17/2020    Chronic diastolic heart failure ([facility] [I50.32] 09/20/2018    Type 2 diabetes 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, with long-term current use of insulin ([facility] [E11.22, N18.6, Z99.2, Z79.4] 09/20/2018    Spinal stenosis of lumbar region with neurogenic claudication [M48.062] 12/27/2016    Mixed hyperlipidemia [E78.2] 07/27/2016    Atherosclerosis of coronary artery bypass graft of native heart without angina pectoris [I25.810] 05/04/2015     57years old female patient. Presented on 6/23/2022 with back spasm. Had hemodialysis with subsequent right sided weakness, stroke alert was called. NIH was 8. Impression is dialysis disequilibrium syndrome. Patient had MRI brain and MRA head and neck that were negative for acute events. Plan:     · Continue asa 81 mg   · Continue lipitor 80, we can check lipid panel outpatient  · Would recommend pain management for back pain   · Neurology will sign off.  No stroke    Mick Pimentel MD, MD, 6/26/2022 12:53 PM

## 2022-06-26 NOTE — PROGRESS NOTES
Renal Progress Note    Patient :  Luzma Henderson; 57 y.o. MRN# [id number]  Location:  5380/0712-29  Attending:  Francisco Dennis MD  Admit Date:  6/23/2022   Hospital Day: 1      Subjective:     Patient was admitted with back pain after a fall. She ESRD under Dr. Mika Ramirez at [facility] Tuesday Thursday Saturday. Patient was seen and examined, back pain and muscle spasms have become worse to the point that she is very restless in bed unwilling to move off her left side. A stroke alert was called on her yesterday for change in mental status and not moving left side, EEG and MRI was done. She received dialysis yesterday via CVC, 1.7 L removed, left aVF maturing    Labs: Sodium 141 potassium 4.1, chloride 100, bicarb 25, creatinine 2.77, calcium 8.6, hemoglobin 11.4  Outpatient Medications:     Medications Prior to Admission: gabapentin (NEURONTIN) 300 MG capsule, Take 300 mg by mouth 2 times daily. tiZANidine (ZANAFLEX) 4 MG tablet, Take 4 mg by mouth every 6 hours as needed  carvedilol (COREG) 3.125 MG tablet, Take 1 tablet by mouth 2 times daily  insulin glargine (BASAGLAR KWIKPEN) 100 UNIT/ML injection pen, Inject 50 Units into the skin 2 times daily  miconazole (MICOTIN) 2 % powder, Apply topically 2 times daily. melatonin 10 MG CAPS capsule, Take 10 mg by mouth nightly  lidocaine-prilocaine (EMLA) 2.5-2.5 % cream, Apply topically as needed for Pain Indications: Apply to dialysis site Apply topically as needed.    traZODone (DESYREL) 50 MG tablet, TAKE 1 & 1/2 (ONE & ONE-HALF) TABLETS BY MOUTH NIGHTLY  acetaminophen (TYLENOL) 325 MG tablet, Take 650 mg by mouth every 6 hours as needed for Pain  (Patient not taking: Reported on 6/20/2022)  furosemide (LASIX) 80 MG tablet, Take 1 tablet by mouth 2 times daily  sodium bicarbonate 650 MG tablet, Take 1 tablet by mouth 3 times daily  Insulin Pen Needle (EASY TOUCH PEN NEEDLES) 29G X 12MM MISC, 1 each by Does not apply route daily  ReliOn Lancets Micro-Thin 33G MISC, USE AS DIRECTED EVERY DAY  HUMALOG 100 UNIT/ML injection vial, Inject into the skin 3 times daily (before meals) Indications: 15 units and sliding scale (patient reports only the sliding scale)   Blood Glucose Monitoring Suppl (TRUE METRIX GO GLUCOSE METER) w/Device KIT, 1 each by Does not apply route 4 times daily  Lancets MISC, 1 each by Does not apply route daily  febuxostat (ULORIC) 40 MG TABS tablet, Take 1 tablet by mouth daily  docusate sodium (COLACE) 100 MG capsule, Take 1 capsule by mouth 2 times daily  tamsulosin (FLOMAX) 0.4 MG capsule, Take 1 capsule by mouth daily  atorvastatin (LIPITOR) 80 MG tablet, Take 1 tablet by mouth daily  aspirin 81 MG chewable tablet, Take 1 tablet by mouth daily  ranolazine (RANEXA) 1000 MG extended release tablet, Take 1 tablet by mouth 2 times daily  busPIRone (BUSPAR) 7.5 MG tablet, Take 1 tablet by mouth 3 times daily (Patient taking differently: Take 10 mg by mouth 3 times daily )  pantoprazole (PROTONIX) 40 MG tablet, Take 1 tablet by mouth every morning (before breakfast)    Current Medications:     Scheduled Meds:    [START ON 6/27/2022] insulin lispro  0-12 Units SubCUTAneous TID AC    insulin lispro  0-6 Units SubCUTAneous Nightly    traZODone  50 mg Oral Nightly    sodium bicarbonate  650 mg Oral TID    atorvastatin  80 mg Oral Nightly    heparin (porcine)  5,000 Units SubCUTAneous BID    aspirin  81 mg Oral Daily    busPIRone  10 mg Oral TID    carvedilol  3.125 mg Oral BID    docusate sodium  100 mg Oral BID    furosemide  80 mg Oral BID    insulin glargine  50 Units SubCUTAneous BID    pantoprazole  40 mg Oral QAM AC    ranolazine  1,000 mg Oral BID    tamsulosin  0.4 mg Oral Daily    sodium chloride flush  5-40 mL IntraVENous 2 times per day    potassium bicarb-citric acid  20 mEq Oral Daily     Continuous Infusions:    sodium chloride      dextrose       PRN Meds:  cyclobenzaprine, heparin (porcine), heparin (porcine), sodium chloride flush, sodium chloride, acetaminophen, ondansetron **OR** ondansetron, morphine **OR** morphine, glucose, dextrose bolus **OR** dextrose bolus, glucagon (rDNA), dextrose    Input/Output:       I/O last 3 completed shifts: In: 300   Out:  . Patient Vitals for the past 96 hrs (Last 3 readings):   Weight   22 1321 217 lb 13 oz (98.8 kg)   22 1010 223 lb 8.7 oz (101.4 kg)   22 1220 225 lb 1.4 oz (102.1 kg)       Vital Signs:   Temperature:  Temp: 97.8 °F (36.6 °C)  TMax:   Temp (24hrs), Av.9 °F (36.6 °C), Min:97.5 °F (36.4 °C), Max:98.3 °F (36.8 °C)    Respirations:  Resp: 18  Pulse:   Heart Rate: 74  BP:    BP: (!) 143/70  BP Range: Systolic (27IQU), BI , Min:97 , MMX:324       Diastolic (60ITN), EXS:38, Min:49, Max:98      Physical Examination:     General:  AAO x 3, speaking in full sentences, no accessory muscle use. HEENT: Atraumatic, normocephalic, no throat congestion, moist mucosa. Neck:   No JVD, no thyromegaly, no lymphadenopathy. Chest:   Bilateral vesicular breath sounds, no rales or wheezes. Dialysis catheter in place, no signs symptoms of infection  Cardiac:  S1 S2 RR, no murmurs, gallops or rubs, JVP not raised. Abdomen: Soft, non-tender, no masses or organomegaly, BS audible. :   No suprapubic or flank tenderness. Neuro:  AAO x 3, No FND. SKIN:  No rashes, good skin turgor.   Extremities:  Trace edema, left lower leg wrapped,+ bruit thrill over left aVF    Labs:       Recent Labs     22  0908 22  0627   WBC 4.8 5.6 5.0   RBC 3.24* 3.44* 3.58*   HGB 10.3* 11.2* 11.4*   HCT 32.2* 33.0* 35.5*   MCV 99.4 95.9 99.2   MCH 31.8 32.6 31.8   MCHC 32.0 33.9 32.1   RDW 17.6* 18.0* 18.3*    134* 146   MPV 10.9 10.8 10.6      BMP:   Recent Labs     22  0908 22  0627    135 141   K 3.9 3.4* 4.1   CL 98 98 100   CO2 24 25 25   BUN 17 12 13   CREATININE 2.27* 2.13* 2.77*   GLUCOSE 208* 171* 92   CALCIUM 8.7 8.1* 8.6      Phosphorus:   No results for input(s): PHOS in the last 72 hours.   Magnesium:    Recent Labs     06/23/22  1726   MG 2.0     Albumin:    Recent Labs     06/25/22  2005   LABALBU 3.2*     BNP:      Lab Results   Component Value Date    BNP 41 04/09/2015     SPEP:  Lab Results   Component Value Date    PROT 6.6 06/25/2022   Hep BsAg:         Lab Results   Component Value Date    HEPBSAG NONREACTIVE 03/30/2022     Hep C AB:          Lab Results   Component Value Date    HEPCAB NONREACTIVE 03/30/2022       Urinalysis/Chemistries:      Lab Results   Component Value Date    NITRU NEGATIVE 06/06/2022    COLORU Yellow 06/06/2022    PHUR 6.5 06/06/2022    WBCUA 21 TO 50 06/06/2022    RBCUA 6 TO 9 06/06/2022    MUCUS 1+ 06/06/2022    TRICHOMONAS NOT REPORTED 11/14/2021    YEAST MANY 05/09/2022    BACTERIA MANY 06/06/2022    SPECGRAV 1.017 06/06/2022    LEUKOCYTESUR MOD 06/06/2022    UROBILINOGEN Normal 06/06/2022    BILIRUBINUR NEGATIVE 06/06/2022    GLUCOSEU LARGE 06/06/2022    KETUA NEGATIVE 06/06/2022    AMORPHOUS 1+ 06/06/2022     Urine Sodium:     Lab Results   Component Value Date    JASON 47 11/14/2021     Urine Potassium:  No results found for: KUR  Urine Chloride:    Lab Results   Component Value Date    CLUR 26 11/14/2021       Urine Creatinine:     Lab Results   Component Value Date    LABCREA 83.1 03/11/2022    LABCREA 72.0 11/14/2021         Radiology:     MRI 6/25/22  Impression   Brain MRI:           There is no acute infarction, intracranial hemorrhage, or intracranial mass   lesion.       Mild chronic microangiopathic ischemic disease.       Mild generalized volume loss.       MRA head:       No hemodynamically significant stenosis.  No aneurysm.       MRA neck:       No hemodynamically significant stenosis       CT 6/23/2022  Impression   No acute abnormality of the cervical spine.  No fracture.       Changes related to mature instrumented anterior fusion of C5-C6 and C6-C7.     At C4-C5, moderate bilateral neural foraminal narrowing.       At C5-C6, severe bilateral neural foraminal narrowing. Impression   No acute fracture of the thoracic spine.       Dependent opacities in the partially imaged left lung which are likely due to   atelectasis, though clinical correlation is required to exclude contributory   infection.       Trace left pleural effusion. Assessment:     1. ESRD Tuesday Thursday Saturday at [facility] unit using tunnel catheter and follows up with Dr. Mikey Kohler.  Has immature left upper extremity fistula also  2. ParaVertebral thoracic muscle spasms status post fall with imaging studies negative for fractures  3. CABG with preserved ejection fraction  4. Type 2 diabetes  5. Essential hypertension    Plan:   1. Dialysis per Tuesday Thursday Saturday schedule  2. Daily BMP   3. Fluid restriction  4. Okay to discharge from nephrology standpoint    Nutrition   Please ensure that patient is on a renal diet/TF. Avoid nephrotoxic drugs/contrast exposure. Attending Physician Statement  I have discussed the care of Judge Allan, including pertinent history and exam findings,  with the CNP. I have reviewed the key elements of all parts of the encounter with the CNP. I agree with the assessment, plan and orders as documented.     Dara Woodruff MD MD, MRCP Kerry Canela, FACP   6/26/2022 3:42 PM    Nephrology [address] [place] [address]

## 2022-06-26 NOTE — PROGRESS NOTES
[facility]  Internal Medicine Teaching Residency Program  Inpatient Daily Progress Note  ______________________________________________________________________________    Patient: Gilford Rather  YOB: 1960   VFV:[id number]    Acct: [de-identified]     Room: [id number]  Admit date: 6/23/2022  Today's date: 06/26/22  Number of days in the hospital: 1    SUBJECTIVE   Admitting Diagnosis: Dialysis disequilibrium syndrome  CC:  Back muscle spasms. Pt seen and examined bedside. No acute events overnight. Labs and charts reviewed. Afebrile, hemodynamically stable, saturating well on 3 L O2 NC. She reports back pain and back muscle spasms overnight. Her mentation is improved and is at baseline. Left upper extremity strength is improving. Still has numbness. As per the patient, her back pain is chronic and muscle spasms are similar to what she had prior to spinal fusion procedure. She had hemodialysis day yesterday as per TTS schedule. 1.7L removed. ROS:  Constitutional:  negative for chills, fevers, sweats  Respiratory:  negative for cough, dyspnea on exertion, hemoptysis, shortness of breath, wheezing  Cardiovascular:  negative for chest pain, chest pressure/discomfort, lower extremity edema, palpitations  Gastrointestinal:  negative for abdominal pain, constipation, diarrhea, nausea, vomiting  Neurological:  negative for dizziness, headache  BRIEF HISTORY     A 77-year-old female with PMH significant of ESRD (on TTS schedule), diastolic CHF, chronic respiratory failure (on 3 L O2 via NC), CAD, morbid obesity,  - presented from dialysis center with upper back pain and muscle spasms. She reported that muscle spasms have been present chronically but they have worsened after fall 2-3 days prior to presentation. She complained of mid thoracic pain and muscle spasm. CT of the thoracic spine showed no acute fracture of thoracic spine.   She was admitted under observation unit. She also complained of numbness and tingling in the left hand for last 1 week, she was noted to have diminished radial pulses. Vascular surgery was consulted and they recommended outpatient follow-up with Dr. Leola Licea because neck steps regarding fistula function. Next day, she underwent extra session of hemodialysis. Postdialysis, she was found to be confused and oriented only to self. Neurology was consulted for concern of altered mental status and left upper extremity numbness. CT head was done which showed no acute intracranial abnormality, showed senescent changes including cortical atrophy, atherosclerotic disease of major intracranial vessels and chronic white matter changes. Next day, she underwent her scheduled dialysis session and returned to floor. She still had persistent weakness on right side and stroke alert was called. Patient had NIH score 8. Neurology recommended MRI brain and MRA head and neck without contrast, both of which were unremarkable for any acute stroke or extremity changes. EEG was ordered and she was transferred to internal medicine for further evaluation and management. OBJECTIVE     Vital Signs:  BP (!) 152/92   Pulse 82   Temp 98.3 °F (36.8 °C) (Oral)   Resp 16   Ht 5' 5\" (1.651 m)   Wt 217 lb 13 oz (98.8 kg)   SpO2 100%   BMI 36.25 kg/m²     Temp (24hrs), Av °F (36.7 °C), Min:97.5 °F (36.4 °C), Max:98.3 °F (36.8 °C)    In: 300   Out:      Physical Exam:  Constitutional: This is a well developed, well nourished, 35-39.9 - Obesity Grade II 57y.o. year old female who is alert, oriented, cooperative and in no apparent distress. Head:normocephalic and atraumatic. EENT:  PERRLA. No conjunctival injections. Septum was midline, mucosa was without erythema, exudates or cobblestoning. No thrush was noted. Neck: Supple without thyromegaly. No elevated JVP. Trachea was midline.   Respiratory: Chest was symmetrical without dullness to percussion. Breath sounds bilaterally were clear to auscultation. There were no wheezes, rhonchi or rales. There is no intercostal retraction or use of accessory muscles. No egophony noted. Cardiovascular: Regular without murmur, clicks, gallops or rubs. Abdomen: Slightly rounded and soft without organomegaly. No rebound, rigidity or guarding was appreciated. Lymphatic: No lymphadenopathy. Musculoskeletal: Normal curvature of the spine. No gross muscle weakness. Extremities:  No lower extremity edema, ulcerations, tenderness, varicosities or erythema. Muscle size, tone and strength are normal.  No involuntary movements are noted. AV fistula  Skin:  Warm and dry. Good color, turgor and pigmentation. No lesions or scars.   No cyanosis or clubbing  Neurological/Psychiatric: The patient's general behavior, level of consciousness, thought content and emotional status is normal.        Medications:  Scheduled Medications:    [START ON 6/27/2022] insulin lispro  0-12 Units SubCUTAneous TID AC    insulin lispro  0-6 Units SubCUTAneous Nightly    traZODone  50 mg Oral Nightly    sodium bicarbonate  650 mg Oral TID    atorvastatin  80 mg Oral Nightly    heparin (porcine)  5,000 Units SubCUTAneous BID    aspirin  81 mg Oral Daily    busPIRone  10 mg Oral TID    carvedilol  3.125 mg Oral BID    docusate sodium  100 mg Oral BID    furosemide  80 mg Oral BID    insulin glargine  50 Units SubCUTAneous BID    pantoprazole  40 mg Oral QAM AC    ranolazine  1,000 mg Oral BID    tamsulosin  0.4 mg Oral Daily    sodium chloride flush  5-40 mL IntraVENous 2 times per day    potassium bicarb-citric acid  20 mEq Oral Daily     Continuous Infusions:    sodium chloride      dextrose       PRN Medicationscyclobenzaprine, 5 mg, TID PRN  heparin (porcine), 1,900 Units, PRN  heparin (porcine), 1,900 Units, PRN  sodium chloride flush, 5-40 mL, PRN  sodium chloride, , PRN  acetaminophen, 650 mg, Q4H PRN  ondansetron, 4 mg, Q8H PRN   Or  ondansetron, 4 mg, Q6H PRN  morphine, 2 mg, Q2H PRN   Or  morphine, 4 mg, Q2H PRN  glucose, 4 tablet, PRN  dextrose bolus, 125 mL, PRN   Or  dextrose bolus, 250 mL, PRN  glucagon (rDNA), 1 mg, PRN  dextrose, 100 mL/hr, PRN        Diagnostic Labs:  CBC:   Recent Labs     06/24/22 0908 06/25/22 2005 06/26/22 0627   WBC 4.8 5.6 5.0   RBC 3.24* 3.44* 3.58*   HGB 10.3* 11.2* 11.4*   HCT 32.2* 33.0* 35.5*   MCV 99.4 95.9 99.2   RDW 17.6* 18.0* 18.3*    134* 146     BMP:   Recent Labs     06/24/22 0908 06/25/22 2005 06/26/22 0627    135 141   K 3.9 3.4* 4.1   CL 98 98 100   CO2 24 25 25   BUN 17 12 13   CREATININE 2.27* 2.13* 2.77*     BNP: No results for input(s): BNP in the last 72 hours. PT/INR: No results for input(s): PROTIME, INR in the last 72 hours. APTT: No results for input(s): APTT in the last 72 hours. CARDIAC ENZYMES: No results for input(s): CKMB, CKMBINDEX, TROPONINI in the last 72 hours. Invalid input(s): CKTOTAL;3  FASTING LIPID PANEL:  Lab Results   Component Value Date    CHOL 105 04/09/2015    HDL 43 04/09/2015    TRIG 168 04/09/2015     LIVER PROFILE:   Recent Labs     06/25/22 2005   AST 23   ALT 12   BILITOT 0.97   ALKPHOS 88      MICROBIOLOGY:   Lab Results   Component Value Date/Time    CULTURE KLEBSIELLA PNEUMONIAE >020123 CFU/ML (A) 06/06/2022 05:22 PM    CULTURE  06/06/2022 05:22 PM     PRESUMPTIVE ID: GROUP D ENTEROCOCCUS 10 to 50,000 CFU/ML Susceptibility testing not performed on low colony count organisms. Imaging:    CT HEAD WO CONTRAST    Result Date: 6/25/2022  No acute intracranial abnormality. Senescent changes including cortical atrophy, atherosclerotic disease of the major intracranial vessels and chronic white matter changes. There is no significant change from prior exam.     CT CERVICAL SPINE WO CONTRAST    Result Date: 6/23/2022  No acute abnormality of the cervical spine. No fracture.  Changes related to mature instrumented anterior fusion of C5-C6 and C6-C7. At C4-C5, moderate bilateral neural foraminal narrowing. At C5-C6, severe bilateral neural foraminal narrowing. RECOMMENDATIONS: Unavailable     CT THORACIC SPINE WO CONTRAST    Result Date: 6/23/2022  No acute fracture of the thoracic spine. Dependent opacities in the partially imaged left lung which are likely due to atelectasis, though clinical correlation is required to exclude contributory infection. Trace left pleural effusion. MRA HEAD WO CONTRAST    Result Date: 6/25/2022  Brain MRI: There is no acute infarction, intracranial hemorrhage, or intracranial mass lesion. Mild chronic microangiopathic ischemic disease. Mild generalized volume loss. MRA head: No hemodynamically significant stenosis. No aneurysm. MRA neck: No hemodynamically significant stenosis RECOMMENDATIONS: Unavailable     MRA NECK WO CONTRAST    Result Date: 6/25/2022  Brain MRI: There is no acute infarction, intracranial hemorrhage, or intracranial mass lesion. Mild chronic microangiopathic ischemic disease. Mild generalized volume loss. MRA head: No hemodynamically significant stenosis. No aneurysm. MRA neck: No hemodynamically significant stenosis RECOMMENDATIONS: Unavailable     MRI BRAIN WO CONTRAST    Result Date: 6/25/2022  Brain MRI: There is no acute infarction, intracranial hemorrhage, or intracranial mass lesion. Mild chronic microangiopathic ischemic disease. Mild generalized volume loss. MRA head: No hemodynamically significant stenosis. No aneurysm.  MRA neck: No hemodynamically significant stenosis RECOMMENDATIONS: Unavailable       ASSESSMENT & PLAN       Principal Problem:    Dialysis disequilibrium syndrome  Active Problems:    Spasm of muscle    Low back pain    Altered mental status    Muscle spasm    Atherosclerosis of coronary artery bypass graft of native heart without angina pectoris    Chronic diastolic heart failure (HCC)    Diabetic polyneuropathy associated with type 2 diabetes mellitus ([facility]    History of coronary artery bypass graft    Iron deficiency anemia    Spinal stenosis of lumbar region with neurogenic claudication    Mixed hyperlipidemia    Type 2 diabetes 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, with long-term current use of insulin ([facility])    Obesity, Class II, BMI 35-39.9    ESRD (end stage renal disease) ([facility]  Resolved Problems:    * No resolved hospital problems. *      1. Acute toxic metabolic encephalopathy:  -Mentation improving, back to baseline. Alert and oriented x3.  -Was likely because of hypotension associated with dialysis causing confusion vs dialysis disequilibrium syndrome.  -Dialysis disequilibrium syndrome less likely as the patient has been on dialysis for about a year, reports compliance with HD.  -Stroke and seizure ruled out. Ammonia within normal limits.  -No SDH, intracranial hemorrhage or infarction or metabolic abnormality like hyponatremia or hypoglycemia found.  -Neurology signed off. Recommend pain management for back pain and muscle spasms. 2. Left UL numbness post AV fistula:   -Vascular surgery was consulted. Unlikely related to vascular steal.  Recommend outpatient follow-up with Dr. Brigette Friend. 3. Muscle spasms  -worsened after fall. Has longstanding history of muscle spasm, which was resolved after spinal fusion procedure. Had history of spondylolisthesis? As per the patient. -CT cervical and thoracic spine showed no fracture, previous fusion at C5-C6 and C6-C7.   -Continue muscle relaxant, Flexeril 5 mg 3 times daily. 4. ESRD (on TTS HD):   -Follows up at [facility] dialysis center under Dr. Cadence Adam.  She is encouraged to be compliant with scheduled dialysis. 5. HFpEF:  -Continue on Lasix 80 mg twice daily. Strict CECELIA's. Cardiac diet. 6. Type II DM:  -On Lantus 50 units twice daily, and medium dose sliding scale insulin. Hypoglycemia protocol in place.   7. Diabetic neuropathy:  -Was on

## 2022-06-26 NOTE — PROGRESS NOTES
Occupational Therapy  Facility/Department: [address] ORTHO/MED SURG  Occupational Therapy Daily Treatment Note    Name: Lyndsay Archer  : 1960  MRN: [id number]  Date of Service: 2022    Discharge Recommendations:  Patient would benefit from continued therapy after discharge  OT Equipment Recommendations  ADL Assistive Devices: Grab Bars - toilet       Patient Diagnosis(es): The encounter diagnosis was Spasm of muscle. Past Medical History:  has a past medical history of Arthritis, Backache, unspecified, Cerebral artery occlusion with cerebral infarction ([address] CHF (congestive heart failure) ([address] Chronic kidney disease, Coronary atherosclerosis of artery bypass graft, COVID, Cramp of limb, Gallstones, Hemodialysis patient ([address] Hyperlipidemia, Hypertension, Insomnia, Neuromuscular disorder ([address] Pneumonia, Psychiatric problem, Thyroid disease, Type II or unspecified type diabetes mellitus with renal manifestations, not stated as uncontrolled(250.40), Type II or unspecified type diabetes mellitus without mention of complication, not stated as uncontrolled, and Unspecified vitamin D deficiency. Past Surgical History:  has a past surgical history that includes Coronary artery bypass graft; Knee arthroscopy; Carpal tunnel release; Breast surgery; Tonsillectomy; Hand surgery; Ankle fracture surgery; Cholecystectomy, open (N/A); IR TUNNELED CVC PLACE WO SQ PORT/PUMP > 5 YEARS (2021); AV fistula creation (2021); Dialysis fistula creation (Left, 2021); other surgical history (2022); back surgery; Colonoscopy; eye surgery; fracture surgery; and Cardiac surgery. Treatment Diagnosis: Muscle Spasm      Assessment   Performance deficits / Impairments: Decreased functional mobility ; Decreased safe awareness;Decreased balance;Decreased endurance;Decreased high-level IADLs;Decreased ADL status; Decreased strength  Treatment Diagnosis: Muscle Spasm  Prognosis: Good  Activity Tolerance  Activity Tolerance: Patient limited by pain; Patient limited by fatigue;Patient Tolerated treatment well        Plan   Plan  Times per Week: 3-4 x week     Restrictions  Restrictions/Precautions  Restrictions/Precautions: Contact Precautions,Up as Tolerated,Fall Risk  Required Braces or Orthoses?: No  Position Activity Restriction  Other position/activity restrictions: HD T Th Sat    Subjective   General  Patient assessed for rehabilitation services?: Yes  Family / Caregiver Present: No     Objective   SpO2: 100 %  O2 Device: Nasal cannula  Safety Devices  Type of Devices: Chair alarm in place;Gait belt;Left in chair;Call light within reach  Balance  Sitting: Without support (seated at EOB ~45 min for self care)  Standing: With support (used RW and stood approx 5-6 min for stalin care, transfers and func mob)  Transfer Training  Overall Level of Assistance: Minimum assistance  Interventions: Verbal cues  Gait  Overall Level of Assistance: Minimum assistance  Interventions: Safety awareness training  Assistive Device: Walker, rolling        ADL  Feeding: Independent;Setup  Grooming: Setup;Minimal assistance;Verbal cueing; Increased time to complete (Min-wash hair, SBA-wash face, brush teeth, dry/comb hair)  UE Bathing: Minimal assistance; Increased time to complete;Setup;Verbal cueing (assist to wash back)  LE Bathing: Moderate assistance; Increased time to complete;Setup;Verbal cueing (assist to wash lower legs and feet)  UE Dressing: Minimal assistance; Increased time to complete;Setup;Verbal cueing (assist to snap, thread arms and tie gown)  LE Dressing: Setup;Verbal cueing; Increased time to complete;Maximum assistance (assist to don footies)  Toileting: Contact guard assistance;Setup; Increased time to complete (stood w/RW for stalin care)    Pt in bed upon arrival. Transferred to EOB and setup at tray table for self care (see above for LOF). STS w/RW for stalin care and to transfer from EOB to recliner w/no LOB. Pt wore O2 throughout tx. Pt needs increased time and assist d/t pain, fatigue and muscle spasms in back. Pt cares for elderly mother at home. Bed mobility  Supine to Sit: Minimal assistance  Sit to Supine: Unable to assess  Scooting: Stand by assistance  Bed Mobility Comments: Activity requries extra time and effort, physical assist with bilateral LE's and trunk progression with bed flat and use of bed rails. Pt has a hosp bed at home. Transfers  Sit to stand: Minimal assistance  Stand to sit: Minimal assistance  Transfer Comments: w/vc's for hand placement and use of RW     Cognition  Overall Cognitive Status: [employer]  Education Given To: Patient  Education Provided: Role of Therapy;Plan of Care;Transfer Training; Fall Prevention Strategies; Equipment  Education Method: Demonstration;Verbal  Barriers to Learning: None  Education Outcome: Verbalized understanding;Demonstrated understanding     AM-PAC Score  AM-PAC Inpatient Daily Activity Raw Score: 17 (06/26/22 1103)  AM-PAC Inpatient ADL T-Scale Score : 37.26 (06/26/22 1103)  ADL Inpatient CMS 0-100% Score: 50.11 (06/26/22 1103)  ADL Inpatient CMS G-Code Modifier : CK (06/26/22 1103)    Goals  Short Term Goals  Time Frame for Short term goals: pt will, by discharge:  Short Term Goal 1: Dem Mod I for bed mobility  Short Term Goal 2: Dem cga for functional transfers for daily occupations  Short Term Goal 3: Dem cga for dynamic mobility for household distances  Short Term Goal 4: Dem good safety awareness tech for all transfers/mobility  Short Term Goal 5: Dem cga for adl performance with use of AE as needed     Therapy Time   Individual Concurrent Group Co-treatment   Time In 1000         Time Out 1103         Minutes 63         Timed Code Treatment Minutes: [address] [facility] [place], GARCIA/

## 2022-06-26 NOTE — PROGRESS NOTES
Physical Therapy  Facility/Department: [address] ORTHO/MED SURG  Daily Treatment Note    Name: Flores Leach  : 1960  MRN: [id number]  Date of Service: 2022    Discharge Recommendations:  Patient would benefit from continued therapy after discharge   PT Equipment Recommendations  Equipment Needed: No      Patient Diagnosis(es): The encounter diagnosis was Spasm of muscle. Past Medical History:  has a past medical history of Arthritis, Backache, unspecified, Cerebral artery occlusion with cerebral infarction ([address] CHF (congestive heart failure) ([facility] [address] Chronic kidney disease, Coronary atherosclerosis of artery bypass graft, COVID, Cramp of limb, Gallstones, Hemodialysis patient ([facility] [address] Hyperlipidemia, Hypertension, Insomnia, Neuromuscular disorder ([facility] [address] Pneumonia, Psychiatric problem, Thyroid disease, Type II or unspecified type diabetes mellitus with renal manifestations, not stated as uncontrolled(250.40), Type II or unspecified type diabetes mellitus without mention of complication, not stated as uncontrolled, and Unspecified vitamin D deficiency. Past Surgical History:  has a past surgical history that includes Coronary artery bypass graft; Knee arthroscopy; Carpal tunnel release; Breast surgery; Tonsillectomy; Hand surgery; Ankle fracture surgery; Cholecystectomy, open (N/A); IR TUNNELED CVC PLACE WO SQ PORT/PUMP > 5 YEARS (2021); AV fistula creation (2021); Dialysis fistula creation (Left, 2021); other surgical history (2022); back surgery; Colonoscopy; eye surgery; fracture surgery; and Cardiac surgery. Assessment   Body Structures, Functions, Activity Limitations Requiring Skilled Therapeutic Intervention: Decreased functional mobility ; Decreased ADL status; Decreased ROM; Decreased body mechanics; Decreased tolerance to work activity; Decreased strength;Decreased safe awareness;Decreased cognition;Decreased endurance;Decreased sensation;Decreased balance  Assessment:  The pt amb 15ft x2 with RW CGA, pt is a high fall risk and is unsafe to amb without assist at this time. recomending continued therapy to address deficits. Therapy Prognosis: Guarded  Clinical Presentation: progressing  Requires PT Follow-Up: Yes  Activity Tolerance  Activity Tolerance: Patient limited by pain  Activity Tolerance Comments: re-occurring spasms on back     Plan   Plan  Plan: 5-7 times per week  Specific Instructions for Next Treatment: progress activity as indicated and toleratedl  Current Treatment Recommendations: Strengthening,ROM,Balance training,Functional mobility training,Transfer training,Endurance training,Gait training,Neuromuscular re-education,Stair training  Safety Devices  Type of Devices: Bed alarm in place,Gait belt,Left in bed,Call light within reach  Restraints  Restraints Initially in Place: No     Restrictions  Restrictions/Precautions  Restrictions/Precautions: Contact Precautions,Up as Tolerated,Fall Risk  Required Braces or Orthoses?: No  Position Activity Restriction  Other position/activity restrictions: HD T Th Sat     Subjective   General  Chart Reviewed: Yes  Patient assessed for rehabilitation services?: Yes  Additional Pertinent Hx: This is a 57 y.o. female with end stage renal disease on hemodialysis Tuesday Thursday Saturday using tunnel catheter had a fall 2 days back and subsequently since then started having paravertebral thoracic muscle spasms. Also complains of left upper extremity tingling sensation without any weakness in other extremities. Response To Previous Treatment: Patient with no complaints from previous session. Family / Caregiver Present: Yes  Follows Commands: Within Functional Limits  Subjective  Subjective: RN and pt agreeable to PT. Pt complaint of pain in neck, back pain limiting functional mobilty and abilty to stand longer / ambulate this date     Cognition   Orientation  Overall Orientation Status: Within Functional Limits  Orientation Level: Oriented X4  Cognition  Overall Cognitive Status: WFL  Arousal/Alertness: Delayed responses to stimuli  Following Commands: Follows one step commands with increased time  Attention Span: Attends with cues to redirect  Memory: Decreased recall of biographical Information  Safety Judgement: Decreased awareness of need for assistance  Problem Solving: Assistance required to generate solutions  Insights: Decreased awareness of deficits  Initiation: Requires cues for some  Sequencing: Requires cues for some  Cognition Comment: Pt report memory deficits     Objective      Balance  Sitting: Without support  Standing: With support  Transfer Training  Overall Level of Assistance: Minimum assistance  Interventions: Verbal cues  Gait  Overall Level of Assistance: Minimum assistance  Interventions: Safety awareness training  Assistive Device: Walker, rolling  Bed mobility  Rolling to Left: Minimal assistance  Rolling to Right: Minimal assistance  Supine to Sit: Minimal assistance  Scooting: Stand by assistance  Bed Mobility Comments: Pt up in chair upon arrival and exit. Transfers  Sit to Stand: Minimal Assistance  Stand to sit: Minimal Assistance;2 Person Assistance  Comment: Transfer with RW  Ambulation  WB Status: FWB  Ambulation  Surface: level tile  Device: Rolling Walker  Other Apparatus: O2 (2L)  Assistance: Minimal assistance  Quality of Gait: slow guarded gait, Unsteady , flexed posture  Distance: 15ft x2  Comments: Pt had 3 episodes of spasms in Mid Back during ambulationg making her a fall risk. More Ambulation?: No     Balance  Posture: Fair  Sitting - Static: Good  Sitting - Dynamic: Fair  Standing - Static: Fair  Standing - Dynamic: Fair;-  Comments: Transfer with RW  Exercise Treatment: Seated LE exercise program: Long Arc Quads, hip abduction/adduction, heel/toe raises, and marches.  Reps: x10      AM-PAC Score     AM-PAC Inpatient Mobility without Stair Climbing Raw Score : 13 (06/26/22 9008)  AM-PAC Inpatient without Stair Climbing T-Scale Score : 38.96 (06/26/22 1338)  Mobility Inpatient CMS 0-100% Score: 58.44 (06/26/22 1338)  Mobility Inpatient without Stair CMS G-Code Modifier : CK (06/26/22 1338)       Goals  Short Term Goals  Time Frame for Short term goals: 5 visits  Short term goal 1: Pt to STS with AD and MOD I  Short term goal 2: Pt to ambulate 25 ft RW CGA  Short term goal 3: Pt to tolerate 30 minutes functional activity and exercises to improve endurance. Long Term Goals  Time Frame for Long term goals : Pt to ambulate 50ft RW SBA  Patient Goals   Patient goals : to be able to ambulate with walker       Education  Patient Education  Education Given To: Patient  Education Provided: Role of Therapy;Plan of Care;Precautions;Transfer Training; Fall Prevention Strategies  Education Provided Comments: Pt educated on fall risk, safety with mobilty and posture  Education Method: Demonstration;Verbal  Barriers to Learning: Cognition  Education Outcome: Verbalized understanding;Continued education needed      Therapy Time   Individual Concurrent Group Co-treatment   Time In 1119         Time Out 1149         Minutes 30         Timed Code Treatment Minutes: [address], [facility]

## 2022-06-27 ENCOUNTER — CARE COORDINATION (OUTPATIENT)
Dept: CASE MANAGEMENT | Age: 62
End: 2022-06-27

## 2022-06-27 LAB
ABSOLUTE EOS #: 0.31 K/UL (ref 0–0.44)
ABSOLUTE IMMATURE GRANULOCYTE: 0.04 K/UL (ref 0–0.3)
ABSOLUTE LYMPH #: 1.36 K/UL (ref 1.1–3.7)
ABSOLUTE MONO #: 0.4 K/UL (ref 0.1–1.2)
ANION GAP SERPL CALCULATED.3IONS-SCNC: 14 MMOL/L (ref 9–17)
BASOPHILS # BLD: 1 % (ref 0–2)
BASOPHILS ABSOLUTE: 0.04 K/UL (ref 0–0.2)
BUN BLDV-MCNC: 25 MG/DL (ref 8–23)
CALCIUM SERPL-MCNC: 8.7 MG/DL (ref 8.6–10.4)
CHLORIDE BLD-SCNC: 100 MMOL/L (ref 98–107)
CO2: 24 MMOL/L (ref 20–31)
CREAT SERPL-MCNC: 3.7 MG/DL (ref 0.5–0.9)
EKG ATRIAL RATE: 83 BPM
EKG P AXIS: 84 DEGREES
EKG P-R INTERVAL: 160 MS
EKG Q-T INTERVAL: 404 MS
EKG QRS DURATION: 98 MS
EKG QTC CALCULATION (BAZETT): 474 MS
EKG R AXIS: 58 DEGREES
EKG T AXIS: 123 DEGREES
EKG VENTRICULAR RATE: 83 BPM
EOSINOPHILS RELATIVE PERCENT: 7 % (ref 1–4)
GFR AFRICAN AMERICAN: 15 ML/MIN
GFR NON-AFRICAN AMERICAN: 12 ML/MIN
GFR SERPL CREATININE-BSD FRML MDRD: ABNORMAL ML/MIN/{1.73_M2}
GLUCOSE BLD-MCNC: 143 MG/DL (ref 65–105)
GLUCOSE BLD-MCNC: 144 MG/DL (ref 70–99)
GLUCOSE BLD-MCNC: 281 MG/DL (ref 65–105)
GLUCOSE BLD-MCNC: 301 MG/DL (ref 65–105)
GLUCOSE BLD-MCNC: 339 MG/DL (ref 65–105)
HCT VFR BLD CALC: 32.9 % (ref 36.3–47.1)
HEMOGLOBIN: 10.8 G/DL (ref 11.9–15.1)
IMMATURE GRANULOCYTES: 1 %
LYMPHOCYTES # BLD: 31 % (ref 24–43)
MCH RBC QN AUTO: 31.8 PG (ref 25.2–33.5)
MCHC RBC AUTO-ENTMCNC: 32.8 G/DL (ref 28.4–34.8)
MCV RBC AUTO: 96.8 FL (ref 82.6–102.9)
MONOCYTES # BLD: 9 % (ref 3–12)
NRBC AUTOMATED: 0 PER 100 WBC
PDW BLD-RTO: 18.5 % (ref 11.8–14.4)
PLATELET # BLD: ABNORMAL K/UL (ref 138–453)
PLATELET, FLUORESCENCE: NORMAL K/UL (ref 138–453)
POTASSIUM SERPL-SCNC: 4.5 MMOL/L (ref 3.7–5.3)
RBC # BLD: 3.4 M/UL (ref 3.95–5.11)
RBC # BLD: ABNORMAL 10*6/UL
SEG NEUTROPHILS: 51 % (ref 36–65)
SEGMENTED NEUTROPHILS ABSOLUTE COUNT: 2.27 K/UL (ref 1.5–8.1)
SODIUM BLD-SCNC: 138 MMOL/L (ref 135–144)
WBC # BLD: 4.4 K/UL (ref 3.5–11.3)

## 2022-06-27 PROCEDURE — 6370000000 HC RX 637 (ALT 250 FOR IP)

## 2022-06-27 PROCEDURE — 6370000000 HC RX 637 (ALT 250 FOR IP): Performed by: PHYSICAL MEDICINE & REHABILITATION

## 2022-06-27 PROCEDURE — 6370000000 HC RX 637 (ALT 250 FOR IP): Performed by: GENERAL PRACTICE

## 2022-06-27 PROCEDURE — 6370000000 HC RX 637 (ALT 250 FOR IP): Performed by: EMERGENCY MEDICINE

## 2022-06-27 PROCEDURE — 6370000000 HC RX 637 (ALT 250 FOR IP): Performed by: STUDENT IN AN ORGANIZED HEALTH CARE EDUCATION/TRAINING PROGRAM

## 2022-06-27 PROCEDURE — 85025 COMPLETE CBC W/AUTO DIFF WBC: CPT

## 2022-06-27 PROCEDURE — 2580000003 HC RX 258: Performed by: EMERGENCY MEDICINE

## 2022-06-27 PROCEDURE — 99232 SBSQ HOSP IP/OBS MODERATE 35: CPT | Performed by: INTERNAL MEDICINE

## 2022-06-27 PROCEDURE — 85055 RETICULATED PLATELET ASSAY: CPT

## 2022-06-27 PROCEDURE — 36415 COLL VENOUS BLD VENIPUNCTURE: CPT

## 2022-06-27 PROCEDURE — 6360000002 HC RX W HCPCS

## 2022-06-27 PROCEDURE — 99223 1ST HOSP IP/OBS HIGH 75: CPT | Performed by: PHYSICAL MEDICINE & REHABILITATION

## 2022-06-27 PROCEDURE — 82947 ASSAY GLUCOSE BLOOD QUANT: CPT

## 2022-06-27 PROCEDURE — 93010 ELECTROCARDIOGRAM REPORT: CPT | Performed by: INTERNAL MEDICINE

## 2022-06-27 PROCEDURE — 93005 ELECTROCARDIOGRAM TRACING: CPT | Performed by: STUDENT IN AN ORGANIZED HEALTH CARE EDUCATION/TRAINING PROGRAM

## 2022-06-27 PROCEDURE — 1200000000 HC SEMI PRIVATE

## 2022-06-27 PROCEDURE — 80048 BASIC METABOLIC PNL TOTAL CA: CPT

## 2022-06-27 RX ORDER — MORPHINE SULFATE 2 MG/ML
2 INJECTION, SOLUTION INTRAMUSCULAR; INTRAVENOUS EVERY 4 HOURS PRN
Status: DISCONTINUED | OUTPATIENT
Start: 2022-06-27 | End: 2022-06-28

## 2022-06-27 RX ORDER — BACLOFEN 5 MG/1
2.5 TABLET ORAL 2 TIMES DAILY
Status: DISCONTINUED | OUTPATIENT
Start: 2022-06-27 | End: 2022-06-28

## 2022-06-27 RX ORDER — BACLOFEN 5 MG/1
5 TABLET ORAL 3 TIMES DAILY
Status: DISCONTINUED | OUTPATIENT
Start: 2022-06-27 | End: 2022-06-27

## 2022-06-27 RX ORDER — DULOXETIN HYDROCHLORIDE 30 MG/1
30 CAPSULE, DELAYED RELEASE ORAL DAILY
Status: DISCONTINUED | OUTPATIENT
Start: 2022-06-27 | End: 2022-07-01 | Stop reason: HOSPADM

## 2022-06-27 RX ORDER — BUSPIRONE HYDROCHLORIDE 15 MG/1
15 TABLET ORAL 2 TIMES DAILY
Status: DISCONTINUED | OUTPATIENT
Start: 2022-06-27 | End: 2022-07-01 | Stop reason: HOSPADM

## 2022-06-27 RX ORDER — MIRTAZAPINE 15 MG/1
15 TABLET, FILM COATED ORAL NIGHTLY
Status: DISCONTINUED | OUTPATIENT
Start: 2022-06-27 | End: 2022-07-01 | Stop reason: HOSPADM

## 2022-06-27 RX ADMIN — TAMSULOSIN HYDROCHLORIDE 0.4 MG: 0.4 CAPSULE ORAL at 08:21

## 2022-06-27 RX ADMIN — CARVEDILOL 3.12 MG: 3.12 TABLET, FILM COATED ORAL at 08:18

## 2022-06-27 RX ADMIN — ATORVASTATIN CALCIUM 80 MG: 80 TABLET, FILM COATED ORAL at 22:03

## 2022-06-27 RX ADMIN — ASPIRIN 81 MG: 81 TABLET, CHEWABLE ORAL at 08:18

## 2022-06-27 RX ADMIN — SODIUM BICARBONATE 650 MG: 648 TABLET ORAL at 14:37

## 2022-06-27 RX ADMIN — SODIUM BICARBONATE 650 MG: 648 TABLET ORAL at 08:20

## 2022-06-27 RX ADMIN — BUSPIRONE HYDROCHLORIDE 10 MG: 10 TABLET ORAL at 08:17

## 2022-06-27 RX ADMIN — POTASSIUM BICARBONATE 20 MEQ: 782 TABLET, EFFERVESCENT ORAL at 08:18

## 2022-06-27 RX ADMIN — FUROSEMIDE 80 MG: 40 TABLET ORAL at 16:47

## 2022-06-27 RX ADMIN — SODIUM CHLORIDE, PRESERVATIVE FREE 10 ML: 5 INJECTION INTRAVENOUS at 22:03

## 2022-06-27 RX ADMIN — BACLOFEN 2.5 MG: 5 TABLET ORAL at 22:03

## 2022-06-27 RX ADMIN — BACLOFEN 2.5 MG: 5 TABLET ORAL at 13:44

## 2022-06-27 RX ADMIN — MIRTAZAPINE 15 MG: 15 TABLET, FILM COATED ORAL at 22:02

## 2022-06-27 RX ADMIN — SODIUM BICARBONATE 650 MG: 648 TABLET ORAL at 22:02

## 2022-06-27 RX ADMIN — ACETAMINOPHEN 650 MG: 325 TABLET ORAL at 17:22

## 2022-06-27 RX ADMIN — HEPARIN SODIUM 5000 UNITS: 5000 INJECTION INTRAVENOUS; SUBCUTANEOUS at 22:04

## 2022-06-27 RX ADMIN — RANOLAZINE 1000 MG: 500 TABLET, FILM COATED, EXTENDED RELEASE ORAL at 22:02

## 2022-06-27 RX ADMIN — HEPARIN SODIUM 5000 UNITS: 5000 INJECTION INTRAVENOUS; SUBCUTANEOUS at 08:20

## 2022-06-27 RX ADMIN — DULOXETINE HYDROCHLORIDE 30 MG: 30 CAPSULE, DELAYED RELEASE ORAL at 13:45

## 2022-06-27 RX ADMIN — INSULIN GLARGINE 50 UNITS: 100 INJECTION, SOLUTION SUBCUTANEOUS at 22:06

## 2022-06-27 RX ADMIN — RANOLAZINE 1000 MG: 500 TABLET, FILM COATED, EXTENDED RELEASE ORAL at 08:18

## 2022-06-27 RX ADMIN — SODIUM CHLORIDE, PRESERVATIVE FREE 10 ML: 5 INJECTION INTRAVENOUS at 08:23

## 2022-06-27 RX ADMIN — DOCUSATE SODIUM 100 MG: 100 CAPSULE ORAL at 08:18

## 2022-06-27 RX ADMIN — INSULIN LISPRO 8 UNITS: 100 INJECTION, SOLUTION INTRAVENOUS; SUBCUTANEOUS at 16:47

## 2022-06-27 RX ADMIN — FUROSEMIDE 80 MG: 40 TABLET ORAL at 08:18

## 2022-06-27 RX ADMIN — CYCLOBENZAPRINE 5 MG: 10 TABLET, FILM COATED ORAL at 06:19

## 2022-06-27 RX ADMIN — BUSPIRONE HYDROCHLORIDE 15 MG: 10 TABLET ORAL at 22:02

## 2022-06-27 ASSESSMENT — PAIN DESCRIPTION - PAIN TYPE: TYPE: CHRONIC PAIN

## 2022-06-27 ASSESSMENT — PAIN DESCRIPTION - LOCATION
LOCATION: BACK
LOCATION: BACK

## 2022-06-27 ASSESSMENT — PAIN - FUNCTIONAL ASSESSMENT: PAIN_FUNCTIONAL_ASSESSMENT: PREVENTS OR INTERFERES SOME ACTIVE ACTIVITIES AND ADLS

## 2022-06-27 ASSESSMENT — PAIN SCALES - GENERAL
PAINLEVEL_OUTOF10: 3
PAINLEVEL_OUTOF10: 9
PAINLEVEL_OUTOF10: 8

## 2022-06-27 ASSESSMENT — PAIN DESCRIPTION - ORIENTATION: ORIENTATION: LOWER;MID

## 2022-06-27 ASSESSMENT — PAIN DESCRIPTION - DESCRIPTORS: DESCRIPTORS: ACHING;DISCOMFORT;SPASM

## 2022-06-27 NOTE — PROGRESS NOTES
reports only the sliding scale)   Blood Glucose Monitoring Suppl (TRUE METRIX GO GLUCOSE METER) w/Device KIT, 1 each by Does not apply route 4 times daily  Lancets MISC, 1 each by Does not apply route daily  febuxostat (ULORIC) 40 MG TABS tablet, Take 1 tablet by mouth daily  docusate sodium (COLACE) 100 MG capsule, Take 1 capsule by mouth 2 times daily  tamsulosin (FLOMAX) 0.4 MG capsule, Take 1 capsule by mouth daily  atorvastatin (LIPITOR) 80 MG tablet, Take 1 tablet by mouth daily  aspirin 81 MG chewable tablet, Take 1 tablet by mouth daily  ranolazine (RANEXA) 1000 MG extended release tablet, Take 1 tablet by mouth 2 times daily  busPIRone (BUSPAR) 7.5 MG tablet, Take 1 tablet by mouth 3 times daily (Patient taking differently: Take 10 mg by mouth 3 times daily )  pantoprazole (PROTONIX) 40 MG tablet, Take 1 tablet by mouth every morning (before breakfast)    Current Medications:     Scheduled Meds:    busPIRone  15 mg Oral BID    mirtazapine  15 mg Oral Nightly    DULoxetine  30 mg Oral Daily    insulin lispro  0-12 Units SubCUTAneous TID AC    [Held by provider] gabapentin  300 mg Oral BID    morphine  1 mg IntraVENous Once    sodium bicarbonate  650 mg Oral TID    atorvastatin  80 mg Oral Nightly    heparin (porcine)  5,000 Units SubCUTAneous BID    aspirin  81 mg Oral Daily    carvedilol  3.125 mg Oral BID    docusate sodium  100 mg Oral BID    furosemide  80 mg Oral BID    insulin glargine  50 Units SubCUTAneous BID    pantoprazole  40 mg Oral QAM AC    ranolazine  1,000 mg Oral BID    tamsulosin  0.4 mg Oral Daily    sodium chloride flush  5-40 mL IntraVENous 2 times per day    potassium bicarb-citric acid  20 mEq Oral Daily     Continuous Infusions:    sodium chloride      dextrose       PRN Meds:  cyclobenzaprine, heparin (porcine), heparin (porcine), sodium chloride flush, sodium chloride, acetaminophen, ondansetron **OR** ondansetron, morphine **OR** morphine, glucose, dextrose hours.  Magnesium:    No results for input(s): MG in the last 72 hours.   Albumin:    Recent Labs     06/25/22 2005   LABALBU 3.2*     BNP:      Lab Results   Component Value Date    BNP 41 04/09/2015     SPEP:  Lab Results   Component Value Date    PROT 6.6 06/25/2022   Hep BsAg:         Lab Results   Component Value Date    HEPBSAG NONREACTIVE 03/30/2022     Hep C AB:          Lab Results   Component Value Date    HEPCAB NONREACTIVE 03/30/2022       Urinalysis/Chemistries:      Lab Results   Component Value Date    NITRU NEGATIVE 06/06/2022    COLORU Yellow 06/06/2022    PHUR 6.5 06/06/2022    WBCUA 21 TO 50 06/06/2022    RBCUA 6 TO 9 06/06/2022    MUCUS 1+ 06/06/2022    TRICHOMONAS NOT REPORTED 11/14/2021    YEAST MANY 05/09/2022    BACTERIA MANY 06/06/2022    SPECGRAV 1.017 06/06/2022    LEUKOCYTESUR MOD 06/06/2022    UROBILINOGEN Normal 06/06/2022    BILIRUBINUR NEGATIVE 06/06/2022    GLUCOSEU LARGE 06/06/2022    KETUA NEGATIVE 06/06/2022    AMORPHOUS 1+ 06/06/2022     Urine Sodium:     Lab Results   Component Value Date    JASON 47 11/14/2021     Urine Potassium:  No results found for: KUR  Urine Chloride:    Lab Results   Component Value Date    CLUR 26 11/14/2021       Urine Creatinine:     Lab Results   Component Value Date    LABCREA 83.1 03/11/2022    LABCREA 72.0 11/14/2021         Radiology:     MRI 6/25/22  Impression   Brain MRI:           There is no acute infarction, intracranial hemorrhage, or intracranial mass   lesion.       Mild chronic microangiopathic ischemic disease.       Mild generalized volume loss.       MRA head:       No hemodynamically significant stenosis.  No aneurysm.       MRA neck:       No hemodynamically significant stenosis       CT 6/23/2022  Impression   No acute abnormality of the cervical spine.  No fracture.       Changes related to mature instrumented anterior fusion of C5-C6 and C6-C7.       At C4-C5, moderate bilateral neural foraminal narrowing.       At C5-C6, severe bilateral neural foraminal narrowing. Impression   No acute fracture of the thoracic spine.       Dependent opacities in the partially imaged left lung which are likely due to   atelectasis, though clinical correlation is required to exclude contributory   infection.       Trace left pleural effusion. Assessment:     1. ESRD Tuesday Thursday Saturday at [facility] unit using tunnel catheter and follows up with Dr. Maris Wolfe.  Has immature left upper extremity fistula also  2. ParaVertebral thoracic muscle spasms status post fall with imaging studies negative for fractures  3. CABG with preserved ejection fraction  4. Type 2 diabetes  5. Essential hypertension    Plan:   1. Dialysis per Tuesday Thursday Saturday schedule  2. Daily BMP   3. Fluid restriction  4. Okay to discharge from nephrology standpoint    Nutrition   Please ensure that patient is on a renal diet/TF. Avoid nephrotoxic drugs/contrast exposure.         Jocelyne Petty MD MD, MRCP Isak Barton, FACP   6/27/2022 12:50 PM    Nephrology [address] [place] [address]

## 2022-06-27 NOTE — CARE COORDINATION
Transitional Planning  Called [facility] to check on referral they do not have a bed until next week  Called Reena at [facility] left message with  for Eliseo Brown in admissions  Await call back    Spoke with pt for additional choices      \  12:20  Eliseo Brown called back from [email or web address] [facility] answered questions.   She will speak with admin and xall me back if they can accept    14:15  Amelia Serra called back from [email or web address] they can accept and will start precert today

## 2022-06-27 NOTE — CARE COORDINATION
[facility] following for HD needs. ESRD patient established with [facility] under Dr. Oralia Branch is SNF at discharge. Referrals to [facility]. Will continue to coordinate with [facility] regarding updates.

## 2022-06-27 NOTE — CONSULTS
Physical Medicine & Rehabilitation  Consult Note      Admitting Physician:   Lucy Viera MD    Primary Care Provider:   AFSANEH Foley CNP     Reason for Consult:  Acute Inpatient Rehabilitation, symptom management    Chief Complaint: Upper back pain s/p fall    History of Present Illness:  Referring Provider is requesting an evaluation for appropriate placement upon discharge from acute care. History from chart review and patient. Jonn Marsh is a 57 y.o.  female admitted to [facility] on 6/23/2022. Patient admitted for severe mid/upper back muscle spasms worse for 2 days prior to admission. She had a fall 2 days prior to admission. CT cervical spine was negative for acute changes. CT thoracic spine was negative for acute changes. She also noted numbness/tingling in her L arm. Vascular surgery evaluated patient for diminished radial pulses. She was exhibiting confusion on 6/25/22. Neurology was consulted. CT was negative for acute change. Nephrology managing hemodialysis. Review of Systems:  Constitutional: negative for anorexia, chills, fatigue, fevers, sweats and weight loss  Eyes: negative for redness and visual disturbance  Ears, nose, mouth, throat, and face: negative for earaches, sore throat and tinnitus  Respiratory: negative for cough and shortness of breath  Cardiovascular: negative for chest pain, dyspnea and palpitations  Gastrointestinal: negative for abdominal pain, change in bowel habits, constipation, nausea and vomiting  Genitourinary:negative for dysuria, frequency, hesitancy and urinary incontinence  Integument/breast: negative for pruritus and rash  Musculoskeletal:negative for stiff joints.  Severe muscle pain and spasm at mid back  Neurological: negative for dizziness, headaches   Behavioral/Psych: negative for decreased appetite, depression        Premorbid function:  Requiring some assistance    Current function:    PT:    Bed mobility  Rolling to Left: Minimal assistance  Rolling to Right: Minimal assistance  Supine to Sit: Minimal assistance  Scooting: Stand by assistance  Bed Mobility Comments: Pt up in chair upon arrival and exit. Transfers  Sit to Stand: Minimal Assistance  Stand to sit: Minimal Assistance;2 Person Assistance  Comment: Transfer with RW  Ambulation  WB Status: FWB  Ambulation  Surface: level tile  Device: Rolling Walker  Other Apparatus: O2 (2L)  Assistance: Minimal assistance  Quality of Gait: slow guarded gait, Unsteady , flexed posture  Distance: 15ft x2  Comments: Pt had 3 episodes of spasms in Mid Back during ambulationg making her a fall risk. OT:   ADL  Feeding: Independent;Setup  Grooming: Setup;Minimal assistance;Verbal cueing; Increased time to complete (Min-wash hair, SBA-wash face, brush teeth, dry/comb hair)  UE Bathing: Minimal assistance; Increased time to complete;Setup;Verbal cueing (assist to wash back)  LE Bathing: Moderate assistance; Increased time to complete;Setup;Verbal cueing (assist to wash lower legs and feet)  UE Dressing: Minimal assistance; Increased time to complete;Setup;Verbal cueing (assist to snap, thread arms and tie gown)  LE Dressing: Setup;Verbal cueing; Increased time to complete;Maximum assistance (assist to don footies)  Toileting: Contact guard assistance;Setup; Increased time to complete (stood w/RW for stalin care)    SLP:      Past Medical History:        Diagnosis Date    Arthritis     Backache, unspecified     [facility]erebral artery occlusion with cerebral infarction [facility] [facility]     CHF (congestive heart failure) (HCC)     Chronic kidney disease     Coronary atherosclerosis of artery bypass graft     COVID 1/31/2022    Cramp of limb     Gallstones     Hemodialysis patient ([facility]     Hyperlipidemia     Hypertension     Insomnia     Neuromuscular disorder ([facility] [facility]     Pneumonia     Psychiatric problem     Thyroid disease     Type II or unspecified type diabetes mellitus with renal manifestations, not stated as uncontrolled(250.40)     Type II or unspecified type diabetes mellitus without mention of complication, not stated as uncontrolled     Unspecified vitamin D deficiency        Past Surgical History:        Procedure Laterality Date    ANKLE FRACTURE SURGERY      AV FISTULA CREATION  12/14/2021    BACK SURGERY      BREAST SURGERY      CARDIAC SURGERY      CARPAL TUNNEL RELEASE      CHOLECYSTECTOMY, OPEN N/A     COLONOSCOPY      CORONARY ARTERY BYPASS GRAFT      x3    DIALYSIS FISTULA CREATION Left 12/14/2021    LEFT AV FISTULA CREATION UPPER EXTREMITY performed by Isaac Turner MD at [facility]      HAND SURGERY      IR TUNNELED CATHETER PLACEMENT GREATER THAN 5 YEARS  8/18/2021    IR TUNNELED CATHETER PLACEMENT GREATER THAN 5 YEARS 8/18/2021 [facility]    KNEE ARTHROSCOPY      right    OTHER SURGICAL HISTORY  04/06/2022    cvc exchange    TONSILLECTOMY         Allergies: Allergies   Allergen Reactions    Adhesive Tape Other (See Comments) and Rash     Other reaction(s): Unknown    Ace Inhibitors Other (See Comments)     Cough, states not good for her kidneys    Iv Dye [Iodides]      Stage 4 kidney disease    Nsaids      CANNOT TAKE D/T KIDNEY DISEASE    Metformin And Related Hives, Itching and Rash     Stage 4 kidney disease.         Current Medications:   Current Facility-Administered Medications: busPIRone (BUSPAR) tablet 15 mg, 15 mg, Oral, BID  mirtazapine (REMERON) tablet 15 mg, 15 mg, Oral, Nightly  DULoxetine (CYMBALTA) extended release capsule 30 mg, 30 mg, Oral, Daily  insulin lispro (HUMALOG) injection vial 0-12 Units, 0-12 Units, SubCUTAneous, TID AC  cyclobenzaprine (FLEXERIL) tablet 5 mg, 5 mg, Oral, TID PRN  [Held by provider] gabapentin (NEURONTIN) capsule 300 mg, 300 mg, Oral, BID  morphine injection 1 mg, 1 mg, IntraVENous, Once  sodium bicarbonate tablet 650 mg, 650 mg, Oral, TID  atorvastatin (LIPITOR) tablet 80 mg, 80 mg, Oral, Nightly  heparin (porcine) injection 1,900 Units, 1,900 Units, IntraCATHeter, PRN  heparin (porcine) injection 1,900 Units, 1,900 Units, IntraCATHeter, PRN  heparin (porcine) injection 5,000 Units, 5,000 Units, SubCUTAneous, BID  aspirin chewable tablet 81 mg, 81 mg, Oral, Daily  carvedilol (COREG) tablet 3.125 mg, 3.125 mg, Oral, BID  docusate sodium (COLACE) capsule 100 mg, 100 mg, Oral, BID  furosemide (LASIX) tablet 80 mg, 80 mg, Oral, BID  insulin glargine (LANTUS) injection vial 50 Units, 50 Units, SubCUTAneous, BID  pantoprazole (PROTONIX) tablet 40 mg, 40 mg, Oral, QAM AC  ranolazine (RANEXA) extended release tablet 1,000 mg, 1,000 mg, Oral, BID  tamsulosin (FLOMAX) capsule 0.4 mg, 0.4 mg, Oral, Daily  sodium chloride flush 0.9 % injection 5-40 mL, 5-40 mL, IntraVENous, 2 times per day  sodium chloride flush 0.9 % injection 5-40 mL, 5-40 mL, IntraVENous, PRN  0.9 % sodium chloride infusion, , IntraVENous, PRN  acetaminophen (TYLENOL) tablet 650 mg, 650 mg, Oral, Q4H PRN  ondansetron (ZOFRAN-ODT) disintegrating tablet 4 mg, 4 mg, Oral, Q8H PRN **OR** ondansetron (ZOFRAN) injection 4 mg, 4 mg, IntraVENous, Q6H PRN  morphine (PF) injection 2 mg, 2 mg, IntraVENous, Q2H PRN **OR** morphine injection 4 mg, 4 mg, IntraVENous, Q2H PRN  potassium bicarb-citric acid (EFFER-K) effervescent tablet 20 mEq, 20 mEq, Oral, Daily  glucose chewable tablet 16 g, 4 tablet, Oral, PRN  dextrose bolus 10% 125 mL, 125 mL, IntraVENous, PRN **OR** dextrose bolus 10% 250 mL, 250 mL, IntraVENous, PRN  glucagon (rDNA) injection 1 mg, 1 mg, IntraMUSCular, PRN  dextrose 5 % solution, 100 mL/hr, IntraVENous, PRN    Social History:  Lives With:  (Pt lives with Mother who need assist with care, pt  sisters assist at times with baths, meal prep, and as needed)  Type of Home: House  Home Layout: One level  Home Access: Level entry,Ramped entrance  Bathroom Shower/Tub: Walk-in shower  Bathroom Toilet: Standard  Bathroom Minutes of Exercise per Session: 0 min   Stress: Stress Concern Present    Feeling of Stress : To some extent   Social Connections: Socially Isolated    Frequency of Communication with Friends and Family: More than three times a week    Frequency of Social Gatherings with Friends and Family: More than three times a week    Attends Sabianism Services: Never    Active Member of Clubs or Organizations: No    Attends Club or Organization Meetings: Never    Marital Status: Never    Intimate Partner Violence:     Fear of Current or Ex-Partner: Not on file    Emotionally Abused: Not on file    Physically Abused: Not on file    Sexually Abused: Not on file   Housing Stability: [address] Unable to Pay for Housing in the Last Year: No    Number of Places Lived in the Last Year: 1    Unstable Housing in the Last Year: No       Family History:       Problem Relation Age of Onset    Diabetes Father     Heart Failure Father        Diagnostics:    CBC:   Recent Labs     06/25/22 2005 06/26/22 0627 06/27/22  0725   WBC 5.6 5.0 4.4   RBC 3.44* 3.58* 3.40*   HGB 11.2* 11.4* 10.8*   HCT 33.0* 35.5* 32.9*   MCV 95.9 99.2 96.8   RDW 18.0* 18.3* 18.5*   * 146 See Reflexed IPF Result     BMP:    Recent Labs     06/25/22 2005 06/26/22 0627 06/27/22  0725   GLUCOSE 171* 92 144*   BUN 12 13 25*   CREATININE 2.13* 2.77* 3.70*   CALCIUM 8.1* 8.6 8.7    141 138   K 3.4* 4.1 4.5   CL 98 100 100   CO2 25 25 24   ANIONGAP 12 16 14   LABGLOM 23* 17* 12*   GFRAA 28* 21* 15*   GFR                    HbA1c:   Lab Results   Component Value Date    LABA1C 7.1 (H) 06/25/2022     BNP: No results for input(s): BNP in the last 72 hours. PT/INR: No results for input(s): PROTIME, INR in the last 72 hours. APTT: No results for input(s): APTT in the last 72 hours. CARDIAC ENZYMES: No results for input(s): CKMB, CKMBINDEX, TROPONINT, TROPHS, TROPII in the last 72 hours.     Invalid input(s): CKTOTAL;3   FASTING LIPID PANEL:  Lab Results   Component Value Date    CHOL 105 04/09/2015    HDL 43 04/09/2015    TRIG 168 04/09/2015     LIVER PROFILE:   Recent Labs     06/25/22 2005   AST 23   ALT 12   BILITOT 0.97   ALKPHOS 88        Radiology:    CT THORACIC SPINE 6/23/22  FINDINGS:   BONES/ALIGNMENT: There is normal alignment of the spine. The vertebral body   heights are maintained and the posterior elements appear intact.  There is   chronic appearing ununited fracture through a bridging osteophyte at the   right anterolateral aspect of T9-T10.  There is partially visualized anterior   cervical discectomy and fusion hardware at C7.  No osseous destructive lesion   is seen on a background of osseous demineralization.  Partially visualized   median sternotomy.       DEGENERATIVE CHANGES: There is mild disc height loss throughout the thoracic   spine with bridging osteophytes in the mid to lower thoracic levels.  Mild   multilevel facet arthrosis.  Spinal canal appears grossly patent.       SOFT TISSUES: No acute abnormality in the paraspinal soft tissues. Heterogeneous multinodular thyroid with a dominant 1.8 cm hypodense nodule in   the right lobe, unchanged from prior.  Partially imaged right IJ dialysis   catheter terminates in the proximal right atrium.  Partially visualized   changes from CABG.  Dependent opacities in the left lung with a trace left   pleural effusion.  Heavy small-vessel vascular calcifications throughout   likely due to underlying renal disease.           Impression   No acute fracture of the thoracic spine.       Dependent opacities in the partially imaged left lung which are likely due to   atelectasis, though clinical correlation is required to exclude contributory   infection.       Trace left pleural effusion.      CT CERVICAL SPINE 6/23/22  FINDINGS:   BONES/ALIGNMENT: There is no acute fracture or traumatic malalignment.       DEGENERATIVE CHANGES:       C2-C3: Unremarkable.       C3-C4: Posterior uncovertebral hypertrophy disc bulging.  Moderate bilateral   facet arthropathy.  Mild bilateral neural foraminal.       C4-C5: Grade 1 anterolisthesis.  Mild bilateral facet arthropathy.  Moderate   bilateral neural foraminal narrowing.       C5-C6: Changes related to mature instrumented anterior fusion.  Moderate   bilateral set arthropathy.  Severe bilateral neural foraminal narrowing.       C6-C7: Changes related to mature instrumented anterior fusion.  No canal or   foraminal narrowing.       C7-T1: Unremarkable.       SOFT TISSUES: There is no prevertebral soft tissue swelling.  Right central   line is partially visualized.           Impression   No acute abnormality of the cervical spine.  No fracture.       Changes related to mature instrumented anterior fusion of C5-C6 and C6-C7.       At C4-C5, moderate bilateral neural foraminal narrowing.       At C5-C6, severe bilateral neural foraminal narrowing.         CT HEAD 6/25/22  FINDINGS:   BRAIN/VENTRICLES: There is no acute intracranial hemorrhage, mass effect or   midline shift.  No abnormal extra-axial fluid collection.  The gray-white   differentiation is maintained without evidence of an acute infarct.  There is   no evidence of hydrocephalus.  Heavily calcified vertebral and cavernous   carotid arteries for age noted.  Scattered white matter hypodensity   consistent with chronic microvascular changes noted.       ORBITS: The visualized portion of the orbits demonstrate no acute abnormality.       SINUSES: The visualized paranasal sinuses and mastoid air cells demonstrate   no acute abnormality.       SOFT TISSUES/SKULL:  No acute abnormality of the visualized skull or soft   tissues.           Impression   No acute intracranial abnormality.  Senescent changes including cortical   atrophy, atherosclerotic disease of the major intracranial vessels and   chronic white matter changes.  There is no significant change from prior exam.     MRI BRAIN 6/25/22  FINDINGS:   MRI BRAIN:       There is no acute infarction, intracranial hemorrhage, or intracranial mass   lesion. No mass effect, midline shift, or extra-axial collection is noted.       There are mild nonspecific foci of periventricular and subcortical cerebral   white matter T2/FLAIR hyperintensity, most likely representing chronic   microangiopathic disease in this age group. The brain parenchyma is otherwise   normal. The pituitary gland is normal in appearance. The cerebellar tonsils   are in normal position.       The ventricles, sulci, and cisterns are prominent suggestive of generalized   volume loss. The intracranial flow voids are preserved.       The globes and orbits are within normal limits.  Mucosal retention cyst   within the right sphenoid sinus resulting in its mild opacification.  The   visualized extracranial structures including paranasal sinuses and mastoid   air cells are unremarkable.       MRA HEAD:       ANTERIOR CIRCULATION: No significant stenosis of the intracranial internal   carotid, anterior cerebral, or middle cerebral arteries.       POSTERIOR CIRCULATION: No significant stenosis of the vertebral, basilar, or   posterior cerebral arteries.       ANEURYSM: No intracranial aneurysm is seen.       MRA neck:       AORTIC ARCH/ARCH VESSELS: No dissection or arterial injury.  No significant   stenosis of the brachiocephalic or subclavian arteries.       CAROTID ARTERIES: No dissection, arterial injury, or hemodynamically   significant stenosis by NASCET criteria.       VERTEBRAL ARTERIES: No dissection, arterial injury, or significant stenosis.            Impression   Brain MRI:           There is no acute infarction, intracranial hemorrhage, or intracranial mass   lesion.       Mild chronic microangiopathic ischemic disease.       Mild generalized volume loss.       MRA head:       No hemodynamically significant stenosis.  No aneurysm.       MRA neck:       No hemodynamically significant stenosis       Physical Exam:  BP (!) 114/35   Pulse 72   Temp 97.3 °F (36.3 °C) (Oral)   Resp 17   Ht 5' 5\" (1.651 m)   Wt 217 lb 13 oz (98.8 kg)   SpO2 90%   BMI 36.25 kg/m²     GEN: Well developed, well nourished, no acute distress. HEENT: NCAT, PERRL, EOMI, mucous membranes pink and moist.  RESP: Lungs clear to auscultation. No rales or rhonchi. Respirations WNL and unlabored. CV: Regular rate rhythm. No murmurs, rubs, or gallops. ABD: soft, non-distended, non-tender. BS+ and equal.  NEURO: A&O x 3. Sensation intact to light touch. DTRs 2+  MSK: Functional ROM. Muscle spasm thoracic spine with palpable spasm and trigger points, worse in R thoracic paraspinal muscles. Muscle tone and bulk are normal bilaterally. Strength 5/5 key muscles BUEs  EXT: No calf tenderness to palpation or edema BLEs. SKIN: Warm dry and intact with good turgor. PSYCH: Mood WNL. Affect WNL. Appropriately interactive. Impression:  1. Acute Metabolic Encephalopathy  2. LUE numbness: for outpatient vascular follow up  3. Muscle spasms: worsened after fall. No significant improvement on Flexeril prn. Will trial routine baclofen - reviewed with pharmacist today for renal dosing. 4. ESRD: on HD  5. Heart failure with preserved ejection fraction  6. DM II with neuropathy  7. Morbid obesity  8. CAD: s/p CABG  9. Generalized Anxiety    Recommendations:    1. Diagnosis:  Muscle spasms  2. Therapy: Has PT/OT needs  3. Medical Necessity: As above  4. Support: Has supportive family  5. Rehab Recommendation: Patient awaiting placement at SNF. Will trial transition to routine baclofen for spasm - renally dosed. Will schedule patient for outpatient trigger point injections in clinic. Can also consider TENS unit for pain/spasm. Already on renal dose of gabapentin. Other medications (Buspar, duloxetine, Remeron) may limit trial of amitriptyline for nerve pain. Will follow.   6. DVT Prophylaxis: on heparin    It was my pleasure to evaluate Andrew Durbin today. Please call with questions. Consuelo Puckett MD        This note is created with the assistance of a speech recognition program.  While intending to generate a document that actually reflects the content of the visit, the document can still have some errors including those of syntax and sound a like substitutions which may escape proof reading. In such instances, actual meaning can be extrapolated by contextual diversion.

## 2022-06-27 NOTE — PROGRESS NOTES
[facility]  Internal Medicine Teaching Residency Program  Inpatient Daily Progress Note  ______________________________________________________________________________    Patient: Taylor Cr  YOB: 1960   QWO:[id number]    Acct: [de-identified]     Room: [facility]/0250-01  Admit date: 6/23/2022  Today's date: 06/27/22  Number of days in the hospital: 2    SUBJECTIVE   Admitting Diagnosis: Acute metabolic encephalopathy  CC:  Back muscle spasms. Pt seen and examined bedside. No acute events overnight. Labs and charts reviewed. Afebrile, hemodynamically stable, saturating well on 3 L O2 NC. Her mentation is improved and is at baseline. She has persistent back pain and muscle cramps. No focal neurological deficits. Neurology signed off, recommended to discharge on Vistaril and have outpatient pain management evaluation. ROS:  Constitutional:  negative for chills, fevers, sweats  Respiratory:  negative for cough, dyspnea on exertion, hemoptysis, shortness of breath, wheezing  Cardiovascular:  negative for chest pain, chest pressure/discomfort, lower extremity edema, palpitations  Gastrointestinal:  negative for abdominal pain, constipation, diarrhea, nausea, vomiting  Neurological:  negative for dizziness, headache  BRIEF HISTORY     A 57-year-old female with PMH significant of ESRD (on TTS schedule), diastolic CHF, chronic respiratory failure (on 3 L O2 via NC), CAD, morbid obesity,  - presented from dialysis center with upper back pain and muscle spasms. She reported that muscle spasms have been present chronically but they have worsened after fall 2-3 days prior to presentation. She complained of mid thoracic pain and muscle spasm. CT of the thoracic spine showed no acute fracture of thoracic spine. She was admitted under observation unit.   She also complained of numbness and tingling in the left hand for last 1 week, she was noted to have diminished radial pulses. Vascular surgery was consulted and they recommended outpatient follow-up with Dr. John Smith because neck steps regarding fistula function. Next day, she underwent extra session of hemodialysis. Postdialysis, she was found to be confused and oriented only to self. Neurology was consulted for concern of altered mental status and left upper extremity numbness. CT head was done which showed no acute intracranial abnormality, showed senescent changes including cortical atrophy, atherosclerotic disease of major intracranial vessels and chronic white matter changes. Next day, she underwent her scheduled dialysis session and returned to floor. She still had persistent weakness on right side and stroke alert was called. Patient had NIH score 8. Neurology recommended MRI brain and MRA head and neck without contrast, both of which were unremarkable for any acute stroke or extremity changes. EEG was ordered and she was transferred to internal medicine for further evaluation and management. OBJECTIVE     Vital Signs:  /60   Pulse 85   Temp 98 °F (36.7 °C) (Oral)   Resp 17   Ht 5' 5\" (1.651 m)   Wt 217 lb 13 oz (98.8 kg)   SpO2 100%   BMI 36.25 kg/m²     Temp (24hrs), Av.9 °F (36.6 °C), Min:97.8 °F (36.6 °C), Max:98 °F (36.7 °C)    In: 400   Out: -     Physical Exam:  Constitutional: This is a well developed, well nourished, 35-39.9 - Obesity Grade II 57y.o. year old female who is alert, oriented, cooperative and in no apparent distress. Head:normocephalic and atraumatic. EENT:  PERRLA. No conjunctival injections. Septum was midline, mucosa was without erythema, exudates or cobblestoning. No thrush was noted. Neck: Supple without thyromegaly. No elevated JVP. Trachea was midline. Respiratory: Chest was symmetrical without dullness to percussion. Breath sounds bilaterally were clear to auscultation. There were no wheezes, rhonchi or rales.  There is no intercostal retraction or use of accessory muscles. No egophony noted. Cardiovascular: Regular without murmur, clicks, gallops or rubs. Abdomen: Slightly rounded and soft without organomegaly. No rebound, rigidity or guarding was appreciated. Lymphatic: No lymphadenopathy. Musculoskeletal: Normal curvature of the spine. No gross muscle weakness. Extremities:  No lower extremity edema, ulcerations, tenderness, varicosities or erythema. Muscle size, tone and strength are normal.  No involuntary movements are noted. AV fistula  Skin:  Warm and dry. Good color, turgor and pigmentation. No lesions or scars.   No cyanosis or clubbing  Neurological/Psychiatric: The patient's general behavior, level of consciousness, thought content and emotional status is normal.        Medications:  Scheduled Medications:    insulin lispro  0-12 Units SubCUTAneous TID AC    insulin lispro  0-6 Units SubCUTAneous Nightly    [Held by provider] gabapentin  300 mg Oral BID    morphine  1 mg IntraVENous Once    traZODone  50 mg Oral Nightly    sodium bicarbonate  650 mg Oral TID    atorvastatin  80 mg Oral Nightly    heparin (porcine)  5,000 Units SubCUTAneous BID    aspirin  81 mg Oral Daily    busPIRone  10 mg Oral TID    carvedilol  3.125 mg Oral BID    docusate sodium  100 mg Oral BID    furosemide  80 mg Oral BID    insulin glargine  50 Units SubCUTAneous BID    pantoprazole  40 mg Oral QAM AC    ranolazine  1,000 mg Oral BID    tamsulosin  0.4 mg Oral Daily    sodium chloride flush  5-40 mL IntraVENous 2 times per day    potassium bicarb-citric acid  20 mEq Oral Daily     Continuous Infusions:    sodium chloride      dextrose       PRN Medicationscyclobenzaprine, 5 mg, TID PRN  heparin (porcine), 1,900 Units, PRN  heparin (porcine), 1,900 Units, PRN  sodium chloride flush, 5-40 mL, PRN  sodium chloride, , PRN  acetaminophen, 650 mg, Q4H PRN  ondansetron, 4 mg, Q8H PRN   Or  ondansetron, 4 mg, Q6H PRN  morphine, 2 mg, Q2H PRN   Or  morphine, 4 mg, Q2H PRN  glucose, 4 tablet, PRN  dextrose bolus, 125 mL, PRN   Or  dextrose bolus, 250 mL, PRN  glucagon (rDNA), 1 mg, PRN  dextrose, 100 mL/hr, PRN        Diagnostic Labs:  CBC:   Recent Labs     06/24/22 0908 06/25/22 2005 06/26/22 0627   WBC 4.8 5.6 5.0   RBC 3.24* 3.44* 3.58*   HGB 10.3* 11.2* 11.4*   HCT 32.2* 33.0* 35.5*   MCV 99.4 95.9 99.2   RDW 17.6* 18.0* 18.3*    134* 146     BMP:   Recent Labs     06/24/22 0908 06/25/22 2005 06/26/22 0627    135 141   K 3.9 3.4* 4.1   CL 98 98 100   CO2 24 25 25   BUN 17 12 13   CREATININE 2.27* 2.13* 2.77*     BNP: No results for input(s): BNP in the last 72 hours. PT/INR: No results for input(s): PROTIME, INR in the last 72 hours. APTT: No results for input(s): APTT in the last 72 hours. CARDIAC ENZYMES: No results for input(s): CKMB, CKMBINDEX, TROPONINI in the last 72 hours. Invalid input(s): CKTOTAL;3  FASTING LIPID PANEL:  Lab Results   Component Value Date    CHOL 105 04/09/2015    HDL 43 04/09/2015    TRIG 168 04/09/2015     LIVER PROFILE:   Recent Labs     06/25/22 2005   AST 23   ALT 12   BILITOT 0.97   ALKPHOS 88      MICROBIOLOGY:   Lab Results   Component Value Date/Time    CULTURE KLEBSIELLA PNEUMONIAE >816910 CFU/ML (A) 06/06/2022 05:22 PM    CULTURE  06/06/2022 05:22 PM     PRESUMPTIVE ID: GROUP D ENTEROCOCCUS 10 to 50,000 CFU/ML Susceptibility testing not performed on low colony count organisms. Imaging:    CT HEAD WO CONTRAST    Result Date: 6/25/2022  No acute intracranial abnormality. Senescent changes including cortical atrophy, atherosclerotic disease of the major intracranial vessels and chronic white matter changes. There is no significant change from prior exam.     CT CERVICAL SPINE WO CONTRAST    Result Date: 6/23/2022  No acute abnormality of the cervical spine. No fracture. Changes related to mature instrumented anterior fusion of C5-C6 and C6-C7.  At C4-C5, moderate bilateral neural foraminal narrowing. At C5-C6, severe bilateral neural foraminal narrowing. RECOMMENDATIONS: Unavailable     CT THORACIC SPINE WO CONTRAST    Result Date: 6/23/2022  No acute fracture of the thoracic spine. Dependent opacities in the partially imaged left lung which are likely due to atelectasis, though clinical correlation is required to exclude contributory infection. Trace left pleural effusion. MRA HEAD WO CONTRAST    Result Date: 6/25/2022  Brain MRI: There is no acute infarction, intracranial hemorrhage, or intracranial mass lesion. Mild chronic microangiopathic ischemic disease. Mild generalized volume loss. MRA head: No hemodynamically significant stenosis. No aneurysm. MRA neck: No hemodynamically significant stenosis RECOMMENDATIONS: Unavailable     MRA NECK WO CONTRAST    Result Date: 6/25/2022  Brain MRI: There is no acute infarction, intracranial hemorrhage, or intracranial mass lesion. Mild chronic microangiopathic ischemic disease. Mild generalized volume loss. MRA head: No hemodynamically significant stenosis. No aneurysm. MRA neck: No hemodynamically significant stenosis RECOMMENDATIONS: Unavailable     MRI BRAIN WO CONTRAST    Result Date: 6/25/2022  Brain MRI: There is no acute infarction, intracranial hemorrhage, or intracranial mass lesion. Mild chronic microangiopathic ischemic disease. Mild generalized volume loss. MRA head: No hemodynamically significant stenosis. No aneurysm.  MRA neck: No hemodynamically significant stenosis RECOMMENDATIONS: Unavailable       ASSESSMENT & PLAN       Principal Problem:    Acute metabolic encephalopathy  Active Problems:    Spasm of muscle    Low back pain    Altered mental status    Muscle spasm    Immature arteriovenous fistula (HCC)    Atherosclerosis of coronary artery bypass graft of native heart without angina pectoris    Chronic diastolic heart failure (HCC)    Diabetic polyneuropathy associated with type 2 diabetes mellitus ([facility]    History of coronary artery bypass graft    Iron deficiency anemia    Spinal stenosis of lumbar region with neurogenic claudication    Mixed hyperlipidemia    Type 2 diabetes 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, with long-term current use of insulin ([facility])    Obesity, Class II, BMI 35-39.9    ESRD (end stage renal disease) ([facility] [address]  Resolved Problems:    * No resolved hospital problems. *      1. Acute toxic metabolic encephalopathy:  -Mentation improving, back to baseline. Alert and oriented x3.  -Was likely because of hypotension associated with dialysis causing confusion vs dialysis disequilibrium syndrome.  -Stroke and seizure ruled out. Ammonia within normal limits.  -No SDH, intracranial hemorrhage or infarction or metabolic abnormality like hyponatremia or hypoglycemia found.  -Neurology signed off. Recommend pain management for back pain and muscle spasms. 2. Left UL numbness post AV fistula:   -Vascular surgery was consulted. Unlikely related to vascular steal.  Recommend outpatient follow-up with Dr. Shakila Freedman. 3. Muscle spasms  -worsened after fall. Has longstanding history of muscle spasm, which was resolved after spinal fusion procedure. Had history of spondylolisthesis? As per the patient. -CT cervical and thoracic spine showed no fracture, previous fusion at C5-C6 and C6-C7.   -Continue muscle relaxant, Flexeril 5 mg 3 times daily.   -Outpatient pain management. 4. ESRD (on TTS HD):   -Follows up at [facility] dialysis center under Dr. Richard Vázquez.  She is encouraged to be compliant with scheduled dialysis. 5. HFpEF:  -Continue on Lasix 80 mg twice daily. Strict CECELIA's. Cardiac diet. 6. Type II DM:  -On Lantus 50 units twice daily, and medium dose sliding scale insulin. Hypoglycemia protocol in place. 7. Diabetic neuropathy:  -Was on gabapentin 300 mg twice daily. Neuro recommended to hold given altered mental status  8.  Morbid obesity:  -Contributing to chronic morbidity and delayed recovery  9. CAD s/p CABG:  -Continue aspirin, Lipitor 80 mg, and Coreg 3.125 mg twice daily, and Ranexa thousand mg twice daily  10. Hyperlipidemia:  -Continue Lipitor 80 mg nightly  11. Generalized anxiety disorder  -Continue buspirone 10 mg 3 times daily  -Continue trazodone/Desyrel 50 mg nightly  -We will start on Vistaril    DVT PPx: On heparin 5000 units 3 times daily  GI PPx[de-identified] On Protonix  Discharge planning:  to assist in discharge planning to SNF. Zaida Rodriguez MD   Internal Medicine Resident, PGY-1  [facility]; [place], [place]  6/27/2022, 7:34 AM    Attestation and add on       I have discussed the care of Roberto Salinas , including pertinent history and exam findings,      6/27/22    with the resident. I have seen and examined the patient and the key elements of all parts of the encounter have been performed by me . I agree with the assessment, plan and orders as documented by the resident.      Hospital Problems           Last Modified POA    * (Principal) Acute metabolic encephalopathy 2/43/2542 Yes    Spasm of muscle 6/26/2022 Yes    Low back pain 6/23/2022 Yes    Altered mental status 6/25/2022 Yes    Muscle spasm 6/25/2022 Yes    Immature arteriovenous fistula ([address] 6/26/2022 Yes    Atherosclerosis of coronary artery bypass graft of native heart without angina pectoris 6/26/2022 Yes    Chronic diastolic heart failure ([address] 6/26/2022 Yes    Diabetic polyneuropathy associated with type 2 diabetes mellitus ([address] 6/26/2022 Yes    History of coronary artery bypass graft 6/26/2022 Yes    Iron deficiency anemia 6/26/2022 Yes    Spinal stenosis of lumbar region with neurogenic claudication 6/26/2022 Yes    Mixed hyperlipidemia 6/26/2022 Yes    Type 2 diabetes 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, with long-term current use of insulin ([address] 6/26/2022 Yes    Obesity, Class II, BMI 35-39.9 6/26/2022 Yes    ESRD (end stage renal disease) ([facility] 6/25/2022 Yes             ''''' patient is depressed   Crying easily   Anxious   Complaining of spasms on bending etc. lower back   ''''' discussed case with Dr. Missael Torres physical medicine rehab  Will get their input  Trazodone discontinued  Remeron added for insomnia and depression  Duloxetine added  Pain control and depression  Patient is on BuSpar  Dose changed to 15 twice daily       MD MOE Saeed[address], [address] [place] [address].    Phone [phone]   Fax: [phone]  Answering Service: [phone]

## 2022-06-27 NOTE — CARE COORDINATION
Care Transition    Noted readmission on 9/15/94 - acute metabolic encephalopathy - thought to be hypotension / disequilibrium syndrome secondary to dialysis. Mechanical fall prior to admission. Patient has had 3 admissions in June since her 15 day hospitalization in mid April. Noted per CM note that plan is SNF. Most recent CT episode resolved.

## 2022-06-28 LAB
ANION GAP SERPL CALCULATED.3IONS-SCNC: 16 MMOL/L (ref 9–17)
BUN BLDV-MCNC: 36 MG/DL (ref 8–23)
CALCIUM SERPL-MCNC: 9.2 MG/DL (ref 8.6–10.4)
CHLORIDE BLD-SCNC: 100 MMOL/L (ref 98–107)
CO2: 25 MMOL/L (ref 20–31)
CREAT SERPL-MCNC: 3.97 MG/DL (ref 0.5–0.9)
GFR AFRICAN AMERICAN: 14 ML/MIN
GFR NON-AFRICAN AMERICAN: 11 ML/MIN
GFR SERPL CREATININE-BSD FRML MDRD: ABNORMAL ML/MIN/{1.73_M2}
GLUCOSE BLD-MCNC: 159 MG/DL (ref 65–105)
GLUCOSE BLD-MCNC: 183 MG/DL (ref 70–99)
GLUCOSE BLD-MCNC: 243 MG/DL (ref 65–105)
GLUCOSE BLD-MCNC: 315 MG/DL (ref 65–105)
GLUCOSE BLD-MCNC: 353 MG/DL (ref 65–105)
POTASSIUM SERPL-SCNC: 4.2 MMOL/L (ref 3.7–5.3)
SODIUM BLD-SCNC: 141 MMOL/L (ref 135–144)

## 2022-06-28 PROCEDURE — 80048 BASIC METABOLIC PNL TOTAL CA: CPT

## 2022-06-28 PROCEDURE — 6370000000 HC RX 637 (ALT 250 FOR IP)

## 2022-06-28 PROCEDURE — 1200000000 HC SEMI PRIVATE

## 2022-06-28 PROCEDURE — 99233 SBSQ HOSP IP/OBS HIGH 50: CPT | Performed by: INTERNAL MEDICINE

## 2022-06-28 PROCEDURE — 6360000002 HC RX W HCPCS

## 2022-06-28 PROCEDURE — 6370000000 HC RX 637 (ALT 250 FOR IP): Performed by: EMERGENCY MEDICINE

## 2022-06-28 PROCEDURE — 6370000000 HC RX 637 (ALT 250 FOR IP): Performed by: STUDENT IN AN ORGANIZED HEALTH CARE EDUCATION/TRAINING PROGRAM

## 2022-06-28 PROCEDURE — 90935 HEMODIALYSIS ONE EVALUATION: CPT

## 2022-06-28 PROCEDURE — 36415 COLL VENOUS BLD VENIPUNCTURE: CPT

## 2022-06-28 PROCEDURE — 99232 SBSQ HOSP IP/OBS MODERATE 35: CPT | Performed by: PHYSICAL MEDICINE & REHABILITATION

## 2022-06-28 PROCEDURE — 90935 HEMODIALYSIS ONE EVALUATION: CPT | Performed by: INTERNAL MEDICINE

## 2022-06-28 PROCEDURE — 6360000002 HC RX W HCPCS: Performed by: INTERNAL MEDICINE

## 2022-06-28 PROCEDURE — 6370000000 HC RX 637 (ALT 250 FOR IP): Performed by: GENERAL PRACTICE

## 2022-06-28 PROCEDURE — 82947 ASSAY GLUCOSE BLOOD QUANT: CPT

## 2022-06-28 PROCEDURE — 2580000003 HC RX 258: Performed by: EMERGENCY MEDICINE

## 2022-06-28 RX ORDER — BACLOFEN 5 MG/1
2.5 TABLET ORAL 2 TIMES DAILY
Qty: 30 TABLET | Refills: 0 | Status: SHIPPED | OUTPATIENT
Start: 2022-06-28 | End: 2022-07-06

## 2022-06-28 RX ORDER — BUSPIRONE HYDROCHLORIDE 15 MG/1
15 TABLET ORAL 2 TIMES DAILY
Qty: 60 TABLET | Refills: 0 | Status: SHIPPED | OUTPATIENT
Start: 2022-06-28 | End: 2022-08-23

## 2022-06-28 RX ORDER — BACLOFEN 5 MG/1
5 TABLET ORAL 2 TIMES DAILY
Status: DISCONTINUED | OUTPATIENT
Start: 2022-06-28 | End: 2022-06-28

## 2022-06-28 RX ORDER — DULOXETIN HYDROCHLORIDE 30 MG/1
30 CAPSULE, DELAYED RELEASE ORAL DAILY
Qty: 30 CAPSULE | Refills: 3 | Status: SHIPPED | OUTPATIENT
Start: 2022-06-28 | End: 2022-08-23

## 2022-06-28 RX ORDER — LIDOCAINE 4 G/G
1 PATCH TOPICAL DAILY
Status: COMPLETED | OUTPATIENT
Start: 2022-06-28 | End: 2022-06-29

## 2022-06-28 RX ORDER — BACLOFEN 5 MG/1
2.5 TABLET ORAL 2 TIMES DAILY
Status: DISCONTINUED | OUTPATIENT
Start: 2022-06-28 | End: 2022-07-01 | Stop reason: HOSPADM

## 2022-06-28 RX ORDER — ATORVASTATIN CALCIUM 80 MG/1
80 TABLET, FILM COATED ORAL NIGHTLY
Qty: 30 TABLET | Refills: 3 | Status: SHIPPED | OUTPATIENT
Start: 2022-06-28 | End: 2022-08-23

## 2022-06-28 RX ORDER — HEPARIN SODIUM 1000 [USP'U]/ML
1000 INJECTION, SOLUTION INTRAVENOUS; SUBCUTANEOUS ONCE
Status: COMPLETED | OUTPATIENT
Start: 2022-06-28 | End: 2022-06-28

## 2022-06-28 RX ORDER — MIRTAZAPINE 15 MG/1
15 TABLET, FILM COATED ORAL NIGHTLY
Qty: 30 TABLET | Refills: 3 | Status: SHIPPED | OUTPATIENT
Start: 2022-06-28 | End: 2022-08-23

## 2022-06-28 RX ADMIN — CARVEDILOL 3.12 MG: 3.12 TABLET, FILM COATED ORAL at 21:12

## 2022-06-28 RX ADMIN — SODIUM CHLORIDE, PRESERVATIVE FREE 10 ML: 5 INJECTION INTRAVENOUS at 21:13

## 2022-06-28 RX ADMIN — GABAPENTIN 300 MG: 300 CAPSULE ORAL at 13:05

## 2022-06-28 RX ADMIN — ASPIRIN 81 MG: 81 TABLET, CHEWABLE ORAL at 13:02

## 2022-06-28 RX ADMIN — CARVEDILOL 3.12 MG: 3.12 TABLET, FILM COATED ORAL at 13:02

## 2022-06-28 RX ADMIN — HEPARIN SODIUM 1000 UNITS: 1000 INJECTION INTRAVENOUS; SUBCUTANEOUS at 10:48

## 2022-06-28 RX ADMIN — POTASSIUM BICARBONATE 20 MEQ: 782 TABLET, EFFERVESCENT ORAL at 13:03

## 2022-06-28 RX ADMIN — TAMSULOSIN HYDROCHLORIDE 0.4 MG: 0.4 CAPSULE ORAL at 13:02

## 2022-06-28 RX ADMIN — FUROSEMIDE 80 MG: 40 TABLET ORAL at 13:01

## 2022-06-28 RX ADMIN — SODIUM BICARBONATE 650 MG: 648 TABLET ORAL at 13:01

## 2022-06-28 RX ADMIN — SODIUM BICARBONATE 650 MG: 648 TABLET ORAL at 21:11

## 2022-06-28 RX ADMIN — ATORVASTATIN CALCIUM 80 MG: 80 TABLET, FILM COATED ORAL at 21:12

## 2022-06-28 RX ADMIN — DULOXETINE HYDROCHLORIDE 30 MG: 30 CAPSULE, DELAYED RELEASE ORAL at 13:03

## 2022-06-28 RX ADMIN — RANOLAZINE 1000 MG: 500 TABLET, FILM COATED, EXTENDED RELEASE ORAL at 21:11

## 2022-06-28 RX ADMIN — INSULIN GLARGINE 50 UNITS: 100 INJECTION, SOLUTION SUBCUTANEOUS at 21:13

## 2022-06-28 RX ADMIN — GABAPENTIN 300 MG: 300 CAPSULE ORAL at 21:12

## 2022-06-28 RX ADMIN — INSULIN GLARGINE 50 UNITS: 100 INJECTION, SOLUTION SUBCUTANEOUS at 12:57

## 2022-06-28 RX ADMIN — INSULIN LISPRO 4 UNITS: 100 INJECTION, SOLUTION INTRAVENOUS; SUBCUTANEOUS at 12:57

## 2022-06-28 RX ADMIN — BUSPIRONE HYDROCHLORIDE 15 MG: 10 TABLET ORAL at 13:02

## 2022-06-28 RX ADMIN — DOCUSATE SODIUM 100 MG: 100 CAPSULE ORAL at 21:11

## 2022-06-28 RX ADMIN — HEPARIN SODIUM 1900 UNITS: 1000 INJECTION INTRAVENOUS; SUBCUTANEOUS at 12:52

## 2022-06-28 RX ADMIN — BACLOFEN 2.5 MG: 5 TABLET ORAL at 13:01

## 2022-06-28 RX ADMIN — SODIUM CHLORIDE, PRESERVATIVE FREE 10 ML: 5 INJECTION INTRAVENOUS at 13:09

## 2022-06-28 RX ADMIN — RANOLAZINE 1000 MG: 500 TABLET, FILM COATED, EXTENDED RELEASE ORAL at 13:01

## 2022-06-28 RX ADMIN — HEPARIN SODIUM 5000 UNITS: 5000 INJECTION INTRAVENOUS; SUBCUTANEOUS at 21:13

## 2022-06-28 RX ADMIN — HEPARIN SODIUM 5000 UNITS: 5000 INJECTION INTRAVENOUS; SUBCUTANEOUS at 13:03

## 2022-06-28 RX ADMIN — BUSPIRONE HYDROCHLORIDE 15 MG: 10 TABLET ORAL at 21:12

## 2022-06-28 RX ADMIN — ACETAMINOPHEN 650 MG: 325 TABLET ORAL at 11:47

## 2022-06-28 RX ADMIN — MIRTAZAPINE 15 MG: 15 TABLET, FILM COATED ORAL at 21:11

## 2022-06-28 RX ADMIN — BACLOFEN 2.5 MG: 5 TABLET ORAL at 21:10

## 2022-06-28 RX ADMIN — FUROSEMIDE 80 MG: 40 TABLET ORAL at 21:11

## 2022-06-28 ASSESSMENT — PAIN DESCRIPTION - ORIENTATION
ORIENTATION: MID

## 2022-06-28 ASSESSMENT — PAIN DESCRIPTION - DESCRIPTORS
DESCRIPTORS: ACHING
DESCRIPTORS: ACHING;OTHER (COMMENT)
DESCRIPTORS: ACHING

## 2022-06-28 ASSESSMENT — PAIN DESCRIPTION - LOCATION
LOCATION: BACK

## 2022-06-28 ASSESSMENT — PAIN SCALES - GENERAL
PAINLEVEL_OUTOF10: 10

## 2022-06-28 ASSESSMENT — PAIN DESCRIPTION - ONSET
ONSET: ON-GOING
ONSET: ON-GOING

## 2022-06-28 ASSESSMENT — PAIN DESCRIPTION - FREQUENCY
FREQUENCY: INTERMITTENT
FREQUENCY: INTERMITTENT

## 2022-06-28 ASSESSMENT — PAIN DESCRIPTION - PAIN TYPE
TYPE: ACUTE PAIN
TYPE: ACUTE PAIN

## 2022-06-28 NOTE — PROGRESS NOTES
Dialysis Time Out  To be done by RN and tech or 2 RNs  Staff Names RN Brandi Stacy and DEDE Saez   [x]  Identity of the patient using 2 patient identifiers  [x]  Consent for treatment  [x]  Equipment-proper machine and dialyzer  [x]  B-Hep B status  [x]  Orders- to include bath, blood flow, dialyzer, time and fluid removal  [x]  Access-Correct site and in working order  [x]  Time for patient to ask questions.

## 2022-06-28 NOTE — PROGRESS NOTES
Dialysis Post Treatment Note  Patient tolerated treatment well. Denies complaints at time of discharge. Vitals:    06/28/22 1222   BP: (!) 149/61   Pulse: 84   Resp: 16   Temp: 98.7 °F (37.1 °C)   SpO2: 93%     Pre-Weight = 98.5kg  Post-weight = Weight: 212 lb 11.9 oz (96.5 kg)  Total Liters Processed = Total Liters Processed (l/min): 65.9 l/min  Rinseback Volume (mL) = Rinseback Volume (ml): 300 ml  Net Removal (mL) = 2000ml  Length of treatment=180 minutes   Access: Right IJ Catheter     Treatment completed, tolerated well, no complications pre, intra and post dialysis.     Pt complained of spasm over the mid back prior and all throughout treatment,would ask for a frequ, refused to oral analgesia, after dialysis, agreed to take them, all noted and seen by Dr. Chvaez Robles

## 2022-06-28 NOTE — PROGRESS NOTES
consulted and they recommended outpatient follow-up with Dr. Lyric Law because neck steps regarding fistula function. Next day, she underwent extra session of hemodialysis. Postdialysis, she was found to be confused and oriented only to self. Neurology was consulted for concern of altered mental status and left upper extremity numbness. CT head was done which showed no acute intracranial abnormality, showed senescent changes including cortical atrophy, atherosclerotic disease of major intracranial vessels and chronic white matter changes. Next day, she underwent her scheduled dialysis session and returned to floor. She still had persistent weakness on right side and stroke alert was called. Patient had NIH score 8. Neurology recommended MRI brain and MRA head and neck without contrast, both of which were unremarkable for any acute stroke or extremity changes. EEG was ordered and she was transferred to internal medicine for further evaluation and management. OBJECTIVE     Vital Signs:  BP (!) 123/51   Pulse 72   Temp 97.5 °F (36.4 °C)   Resp 18   Ht 5' 5\" (1.651 m)   Wt 217 lb 2.5 oz (98.5 kg)   SpO2 95%   BMI 36.14 kg/m²     Temp (24hrs), Av.7 °F (36.5 °C), Min:97.5 °F (36.4 °C), Max:98.2 °F (36.8 °C)    No intake/output data recorded. Physical Exam:  Constitutional: This is a well developed, well nourished, 35-39.9 - Obesity Grade II 57y.o. year old female who is alert, oriented, cooperative and in no apparent distress. Head:normocephalic and atraumatic. EENT:  PERRLA. No conjunctival injections. Septum was midline, mucosa was without erythema, exudates or cobblestoning. No thrush was noted. Neck: Supple without thyromegaly. No elevated JVP. Trachea was midline. Respiratory: Chest was symmetrical without dullness to percussion. Breath sounds bilaterally were clear to auscultation. There were no wheezes, rhonchi or rales.  There is no intercostal retraction or use of accessory muscles. No egophony noted. Cardiovascular: Regular without murmur, clicks, gallops or rubs. Abdomen: Slightly rounded and soft without organomegaly. No rebound, rigidity or guarding was appreciated. Lymphatic: No lymphadenopathy. Musculoskeletal: Normal curvature of the spine. No gross muscle weakness. Extremities:  No lower extremity edema, ulcerations, tenderness, varicosities or erythema. Muscle size, tone and strength are normal.  No involuntary movements are noted. AV fistula  Skin:  Warm and dry. Good color, turgor and pigmentation. No lesions or scars.   No cyanosis or clubbing  Neurological/Psychiatric: The patient's general behavior, level of consciousness, thought content and emotional status is normal.        Medications:  Scheduled Medications:    Baclofen  2.5 mg Oral BID    busPIRone  15 mg Oral BID    mirtazapine  15 mg Oral Nightly    DULoxetine  30 mg Oral Daily    insulin lispro  0-12 Units SubCUTAneous TID AC    gabapentin  300 mg Oral BID    sodium bicarbonate  650 mg Oral TID    atorvastatin  80 mg Oral Nightly    heparin (porcine)  5,000 Units SubCUTAneous BID    aspirin  81 mg Oral Daily    carvedilol  3.125 mg Oral BID    docusate sodium  100 mg Oral BID    furosemide  80 mg Oral BID    insulin glargine  50 Units SubCUTAneous BID    pantoprazole  40 mg Oral QAM AC    ranolazine  1,000 mg Oral BID    tamsulosin  0.4 mg Oral Daily    sodium chloride flush  5-40 mL IntraVENous 2 times per day    potassium bicarb-citric acid  20 mEq Oral Daily     Continuous Infusions:    sodium chloride      dextrose       PRN Medicationsheparin (porcine), 1,900 Units, PRN  heparin (porcine), 1,900 Units, PRN  sodium chloride flush, 5-40 mL, PRN  sodium chloride, , PRN  acetaminophen, 650 mg, Q4H PRN  ondansetron, 4 mg, Q8H PRN   Or  ondansetron, 4 mg, Q6H PRN  glucose, 4 tablet, PRN  dextrose bolus, 125 mL, PRN   Or  dextrose bolus, 250 mL, PRN  glucagon (rDNA), 1 mg, SPINE WO CONTRAST    Result Date: 6/23/2022  No acute fracture of the thoracic spine. Dependent opacities in the partially imaged left lung which are likely due to atelectasis, though clinical correlation is required to exclude contributory infection. Trace left pleural effusion. MRA HEAD WO CONTRAST    Result Date: 6/25/2022  Brain MRI: There is no acute infarction, intracranial hemorrhage, or intracranial mass lesion. Mild chronic microangiopathic ischemic disease. Mild generalized volume loss. MRA head: No hemodynamically significant stenosis. No aneurysm. MRA neck: No hemodynamically significant stenosis RECOMMENDATIONS: Unavailable     MRA NECK WO CONTRAST    Result Date: 6/25/2022  Brain MRI: There is no acute infarction, intracranial hemorrhage, or intracranial mass lesion. Mild chronic microangiopathic ischemic disease. Mild generalized volume loss. MRA head: No hemodynamically significant stenosis. No aneurysm. MRA neck: No hemodynamically significant stenosis RECOMMENDATIONS: Unavailable     MRI BRAIN WO CONTRAST    Result Date: 6/25/2022  Brain MRI: There is no acute infarction, intracranial hemorrhage, or intracranial mass lesion. Mild chronic microangiopathic ischemic disease. Mild generalized volume loss. MRA head: No hemodynamically significant stenosis. No aneurysm.  MRA neck: No hemodynamically significant stenosis RECOMMENDATIONS: Unavailable       ASSESSMENT & PLAN       Principal Problem:    Acute metabolic encephalopathy  Active Problems:    Spasm of muscle    Low back pain    Altered mental status    Muscle spasm    Immature arteriovenous fistula (HCC)    History of fusion of cervical spine    Depression with anxiety    Atherosclerosis of coronary artery bypass graft of native heart without angina pectoris    Chronic diastolic heart failure (HCC)    Diabetic polyneuropathy associated with type 2 diabetes mellitus ([facility] [address]    History of coronary artery bypass graft    Iron deficiency anemia Spinal stenosis of lumbar region with neurogenic claudication    Mixed hyperlipidemia    Type 2 diabetes 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, with long-term current use of insulin ([facility])    Obesity, Class II, BMI 35-39.9    ESRD (end stage renal disease) ([facility] [address]  Resolved Problems:    * No resolved hospital problems. *      1. Acute toxic metabolic encephalopathy:  -Mentation improving, back to baseline. Alert and oriented x3.  -Was likely because of hypotension associated with dialysis causing confusion vs dialysis disequilibrium syndrome.  -Stroke and seizure ruled out. Ammonia within normal limits.  -No SDH, intracranial hemorrhage or infarction or metabolic abnormality like hyponatremia or hypoglycemia found.  -Neurology signed off. Recommend pain management for back pain and muscle spasms. 2. Left UL numbness post AV fistula:   -Vascular surgery was consulted. Unlikely related to vascular steal.  Recommend outpatient follow-up with Dr. Yanique Lorenz. 3. Muscle spasms  -worsened after fall. Has longstanding history of muscle spasm, which was resolved after spinal fusion procedure. -CT cervical and thoracic spine showed no fracture, previous fusion at C5-C6 and C6-C7.   -Continue muscle relaxant, Flexeril 5 mg 3 times daily.   -Outpatient pain management. PMNR consulted. Recommends outpatient trigger point injection.  -Started on renally adjusted dose of baclofen 2.5 mg twice daily  4. ESRD (on TTS HD):   -Follows up at [facility] dialysis center under Dr. Mika Ramirez.  She is encouraged to be compliant with scheduled dialysis. 5. HFpEF:  -Continue on Lasix 80 mg twice daily. Strict CECELIA's. Cardiac diet. 6. Type II DM:  -On Lantus 50 units twice daily, and medium dose sliding scale insulin. Hypoglycemia protocol in place. 7. Diabetic neuropathy:  -Was on gabapentin 300 mg twice daily. Neuro recommended to hold given altered mental status  8.  Morbid obesity:  -Contributing to chronic morbidity and delayed recovery  9. CAD s/p CABG:  -Continue aspirin, Lipitor 80 mg, and Coreg 3.125 mg twice daily, and Ranexa thousand mg twice daily  10. Hyperlipidemia:  -Continue Lipitor 80 mg nightly  11. Generalized anxiety disorder  -Buspirone-changed from 10 Mg 3 times daily to 15 mg twice daily  -Continue trazodone/Desyrel 50 mg nightly  -Added on Remeron 15 mg  -Started on duloxetine 30 Mg OD    DVT PPx: On heparin 5000 units 3 times daily  GI PPx[de-identified] On Protonix  Discharge planning:  to assist in discharge planning to SNF. Sam Allan MD   Internal Medicine Resident, PGY-1  [address] [facility]; [place], [place]  6/28/2022, 7:17 AM    Attestation and add on       I have discussed the care of Andrew Durbin , including pertinent history and exam findings,      6/28/22    with the resident. I have seen and examined the patient and the key elements of all parts of the encounter have been performed by me . I agree with the assessment, plan and orders as documented by the resident.      Hospital Problems           Last Modified POA    * (Principal) Acute metabolic encephalopathy 7/60/8371 Yes    Spasm of muscle 6/26/2022 Yes    Low back pain 6/23/2022 Yes    Altered mental status 6/25/2022 Yes    Muscle spasm 6/25/2022 Yes    Immature arteriovenous fistula ([address] 6/26/2022 Yes    History of fusion of cervical spine 6/27/2022 Yes    Depression with anxiety 6/27/2022 Yes    Atherosclerosis of coronary artery bypass graft of native heart without angina pectoris 6/26/2022 Yes    Chronic diastolic heart failure ([address] 6/26/2022 Yes    Diabetic polyneuropathy associated with type 2 diabetes mellitus ([address] 6/26/2022 Yes    History of coronary artery bypass graft 6/26/2022 Yes    Iron deficiency anemia 6/26/2022 Yes    Spinal stenosis of lumbar region with neurogenic claudication 6/26/2022 Yes    Mixed hyperlipidemia 6/26/2022 Yes    Type 2 diabetes mellitus with chronic kidney disease on chronic

## 2022-06-28 NOTE — PROGRESS NOTES
Physical Medicine & Rehabilitation  Progress Note        Admitting Physician: Sonam Friend MD    Primary Care Provider: AFSANEH Sanford CNP     Chief Complaint: Upper back pain s/p fall    Brief History: This is a follow up to the initial consult on Ms. Dipesh Johnston is a 57 y.o. right handed female who was admitted to [facility] on 6/23/2022 with Back Pain (upper, [place] last week)    Patient admitted for severe mid/upper back muscle spasms worse for 2 days prior to admission. She had a fall 2 days prior to admission. CT cervical spine was negative for acute changes. CT thoracic spine was negative for acute changes. She also noted numbness/tingling in her L arm. Vascular surgery evaluated patient for diminished radial pulses. She was exhibiting confusion on 6/25/22. Neurology was consulted. CT was negative for acute change. Nephrology managing hemodialysis. Subjective: The patient is not resting comfortably. The patient's mobility is unchanged. Observed in hemodialysis today with spasms occurring       ROS:  Constitutional: negative for anorexia, chills, fatigue, fevers, sweats and weight loss  Eyes: negative for redness and visual disturbance  Ears, nose, mouth, throat, and face: negative for earaches, epistaxis, sore throat and tinnitus  Respiratory: negative for cough and shortness of breath  Cardiovascular: negative for chest pain, dyspnea and palpitations  Gastrointestinal: negative for abdominal pain, change in bowel habits, constipation, nausea and vomiting  Genitourinary:negative for dysuria, frequency, hesitancy and urinary incontinence  Integument/breast: negative for pruritus and rash  Musculoskeletal:negative for stiff joints  Neurological: negative for dizziness, headaches   Behavioral/Psych: negative for decreased appetite, depression     Rehabilitation:   Progressing in therapies.     PT:  None today due to hemodialysis  Bed Mobility-       Restrictions/ Precautions-  Restrictions/Precautions  Restrictions/Precautions: Contact Precautions,Up as Tolerated,Fall Risk  Required Braces or Orthoses?: No    Transfers-       Ambulation-          OT:  None today due to hemodialysis  ADLS-                                              SLP:      Objective:  BP (!) 132/48   Pulse 80   Temp 98.2 °F (36.8 °C) (Oral)   Resp 17   Ht 5' 5\" (1.651 m)   Wt 217 lb 13 oz (98.8 kg)   SpO2 95%   BMI 36.25 kg/m²       GEN: Well developed, well nourished, no acute distress. HEENT: NCAT, PERRL, EOMI, mucous membranes pink and moist.  RESP: Lungs clear to auscultation. No rales or rhonchi. Respirations WNL and unlabored. CV: Regular rate rhythm. No murmurs, rubs, or gallops. ABD: soft, non-distended, non-tender. BS+ and equal.  NEURO: A&O x 3. Sensation intact to light touch. DTRs 2+  MSK: Functional ROM. Muscle spasm thoracic spine with palpable spasm and trigger points, worse in R thoracic paraspinal muscles. Muscle tone and bulk are normal bilaterally. Strength 5/5 key muscles BUEs  EXT: No calf tenderness to palpation or edema BLEs. SKIN: Warm dry and intact with good turgor. PSYCH: Mood WNL. Affect WNL. Appropriately interactive.     Current Medications:   Current Facility-Administered Medications: Baclofen (LIORESAL) tablet 2.5 mg, 2.5 mg, Oral, BID  busPIRone (BUSPAR) tablet 15 mg, 15 mg, Oral, BID  mirtazapine (REMERON) tablet 15 mg, 15 mg, Oral, Nightly  DULoxetine (CYMBALTA) extended release capsule 30 mg, 30 mg, Oral, Daily  insulin lispro (HUMALOG) injection vial 0-12 Units, 0-12 Units, SubCUTAneous, TID AC  gabapentin (NEURONTIN) capsule 300 mg, 300 mg, Oral, BID  sodium bicarbonate tablet 650 mg, 650 mg, Oral, TID  atorvastatin (LIPITOR) tablet 80 mg, 80 mg, Oral, Nightly  heparin (porcine) injection 1,900 Units, 1,900 Units, IntraCATHeter, PRN  heparin (porcine) injection 1,900 Units, 1,900 Units, IntraCATHeter, PRN  heparin (porcine) injection 5,000 Units, 5,000 Units, SubCUTAneous, BID  aspirin chewable tablet 81 mg, 81 mg, Oral, Daily  carvedilol (COREG) tablet 3.125 mg, 3.125 mg, Oral, BID  docusate sodium (COLACE) capsule 100 mg, 100 mg, Oral, BID  furosemide (LASIX) tablet 80 mg, 80 mg, Oral, BID  insulin glargine (LANTUS) injection vial 50 Units, 50 Units, SubCUTAneous, BID  pantoprazole (PROTONIX) tablet 40 mg, 40 mg, Oral, QAM AC  ranolazine (RANEXA) extended release tablet 1,000 mg, 1,000 mg, Oral, BID  tamsulosin (FLOMAX) capsule 0.4 mg, 0.4 mg, Oral, Daily  sodium chloride flush 0.9 % injection 5-40 mL, 5-40 mL, IntraVENous, 2 times per day  sodium chloride flush 0.9 % injection 5-40 mL, 5-40 mL, IntraVENous, PRN  0.9 % sodium chloride infusion, , IntraVENous, PRN  acetaminophen (TYLENOL) tablet 650 mg, 650 mg, Oral, Q4H PRN  ondansetron (ZOFRAN-ODT) disintegrating tablet 4 mg, 4 mg, Oral, Q8H PRN **OR** ondansetron (ZOFRAN) injection 4 mg, 4 mg, IntraVENous, Q6H PRN  potassium bicarb-citric acid (EFFER-K) effervescent tablet 20 mEq, 20 mEq, Oral, Daily  glucose chewable tablet 16 g, 4 tablet, Oral, PRN  dextrose bolus 10% 125 mL, 125 mL, IntraVENous, PRN **OR** dextrose bolus 10% 250 mL, 250 mL, IntraVENous, PRN  glucagon (rDNA) injection 1 mg, 1 mg, IntraMUSCular, PRN  dextrose 5 % solution, 100 mL/hr, IntraVENous, PRN      Diagnostics:     CBC: Recent Labs     06/25/22 2005 06/26/22  0627 06/27/22  0725   WBC 5.6 5.0 4.4   RBC 3.44* 3.58* 3.40*   HGB 11.2* 11.4* 10.8*   HCT 33.0* 35.5* 32.9*   MCV 95.9 99.2 96.8   RDW 18.0* 18.3* 18.5*   * 146 See Reflexed IPF Result     BMP:    Recent Labs     06/26/22  0627 06/27/22  0725 06/28/22  0623   GLUCOSE 92 144* 183*   BUN 13 25* 36*   CREATININE 2.77* 3.70* 3.97*   CALCIUM 8.6 8.7 9.2    138 141   K 4.1 4.5 4.2    100 100   CO2 25 24 25   ANIONGAP 16 14 16   LABGLOM 17* 12* 11*   GFRAA 21* 15* 14*   GFR                    HbA1c:   Lab Results   Component Value Date    LABA1C 7.1 (H) 06/25/2022 BNP: No results for input(s): BNP in the last 72 hours. PT/INR: No results for input(s): PROTIME, INR in the last 72 hours. APTT: No results for input(s): APTT in the last 72 hours. CARDIAC ENZYMES: No results for input(s): CKMB, CKMBINDEX, TROPONINT, TROPHS, TROPII in the last 72 hours. Invalid input(s): CKTOTAL;3   FASTING LIPID PANEL:  Lab Results   Component Value Date    CHOL 105 04/09/2015    HDL 43 04/09/2015    TRIG 168 04/09/2015     LIVER PROFILE:   Recent Labs     06/25/22 2005   AST 23   ALT 12   BILITOT 0.97   ALKPHOS 88        Radiology:        Impression/Plan:    1. Acute Metabolic Encephalopathy  2. LUE numbness: for outpatient vascular follow up  3. Muscle spasms: worsened after fall. No significant improvement on Flexeril prn. Will trial routine baclofen - reviewed with pharmacist today for renal dosing. 4. ESRD: on HD  5. Heart failure with preserved ejection fraction  6. DM II with neuropathy  7. Morbid obesity  8. CAD: s/p CABG  9. Generalized Anxiety      Recommendation:  1. No significant difference in muscle spasms with baclofen vs cyclobenzaprine. Will plan for outpatient trigger point injections and TENS units. Can be seen in the office for these procedures. 2. Would consider trial of amitriptyline for neurologic pain if interchangeable for one of her SSRI medications. 3. DVT Prophylaxis: on heparin        Electronically signed by Noble Romberg, MD on 6/28/2022 at 9:28 AM       This note is created with the assistance of a speech recognition program.  While intending to generate a document that actually reflects the content of the visit, the document can still have some errors including those of syntax and sound a like substitutions which may escape proof reading. In such instances, actual meaning can be extrapolated by contextual diversion.

## 2022-06-28 NOTE — PROGRESS NOTES
Physical Therapy        Physical Therapy Cancel Note      DATE: 2022    NAME: Sunny Rivera  MRN: [id number]   : 1960      Patient not seen this date for Physical Therapy due to:    Hemodialysis:PT to attempt later as able.        Electronically signed by Keri Walker PT on 2022 at 9:51 AM

## 2022-06-28 NOTE — PLAN OF CARE
Problem: Skin/Tissue Integrity  Goal: Absence of new skin breakdown  Description: 1. Monitor for areas of redness and/or skin breakdown  2. Assess vascular access sites hourly  3. Every 4-6 hours minimum:  Change oxygen saturation probe site  4. Every 4-6 hours:  If on nasal continuous positive airway pressure, respiratory therapy assess nares and determine need for appliance change or resting period.   6/28/2022 0306 by Moncho Rowan RN  Outcome: Progressing     Problem: Safety - Adult  Goal: Free from fall injury  6/28/2022 0306 by Moncho Rowan RN  Outcome: Progressing     Problem: Pain  Goal: Verbalizes/displays adequate comfort level or baseline comfort level  6/28/2022 0306 by Moncho Rowan RN  Outcome: Progressing

## 2022-06-28 NOTE — PROGRESS NOTES
Nephrology Progress Note        Subjective: Patient is seen and evaluated on dialysis. Patient is stable on dialysis. Access cannulated without problems. No new issues overnite. Stable hemodynamics. Objective:  CURRENT TEMPERATURE:  Temp: 97.5 °F (36.4 °C)  MAXIMUM TEMPERATURE OVER 24HRS:  Temp (24hrs), Av.7 °F (36.5 °C), Min:97.5 °F (36.4 °C), Max:98.2 °F (36.8 °C)    CURRENT RESPIRATORY RATE:  Resp: 18  CURRENT PULSE:  Heart Rate: 72  CURRENT BLOOD PRESSURE:  BP: (!) 116/43  24HR BLOOD PRESSURE RANGE:  Systolic (29MJT), ODP:146 , Min:116 , CK   ; Diastolic (71FPN), FBC:98, Min:34, Max:66    24HR INTAKE/OUTPUT:  No intake or output data in the 24 hours ending 22 1142  Patient Vitals for the past 96 hrs (Last 3 readings):   Weight   22 0831 217 lb 2.5 oz (98.5 kg)   22 1321 217 lb 13 oz (98.8 kg)   22 1010 223 lb 8.7 oz (101.4 kg)         Physical Exam:  General appearance:Awake, alert, in no acute distress  Skin: warm and dry, no rash or erythema  Eyes: conjunctivae normal and sclera anicteric  ENT: Moist mucous membranes  Neck:  No JVD, midline trachea and no accessory muscle use  Pulmonary: clear to auscultation bilaterally and no wheezing or rhonchi   Cardiovascular: Regular rate and rhythm positive S1 and S2 and no rubs  Abdomen: soft nontender, bowel sounds present, no ascites   Extremities: no cyanosis, clubbing or edema     Access:   As previous    Current Medications:    Baclofen (LIORESAL) tablet 2.5 mg, BID  busPIRone (BUSPAR) tablet 15 mg, BID  mirtazapine (REMERON) tablet 15 mg, Nightly  DULoxetine (CYMBALTA) extended release capsule 30 mg, Daily  insulin lispro (HUMALOG) injection vial 0-12 Units, TID AC  gabapentin (NEURONTIN) capsule 300 mg, BID  sodium bicarbonate tablet 650 mg, TID  atorvastatin (LIPITOR) tablet 80 mg, Nightly  heparin (porcine) injection 1,900 Units, PRN  heparin (porcine) injection 1,900 Units, PRN  heparin (porcine) injection 5,000 Units, BID  aspirin chewable tablet 81 mg, Daily  carvedilol (COREG) tablet 3.125 mg, BID  docusate sodium (COLACE) capsule 100 mg, BID  furosemide (LASIX) tablet 80 mg, BID  insulin glargine (LANTUS) injection vial 50 Units, BID  pantoprazole (PROTONIX) tablet 40 mg, QAM AC  ranolazine (RANEXA) extended release tablet 1,000 mg, BID  tamsulosin (FLOMAX) capsule 0.4 mg, Daily  sodium chloride flush 0.9 % injection 5-40 mL, 2 times per day  sodium chloride flush 0.9 % injection 5-40 mL, PRN  0.9 % sodium chloride infusion, PRN  acetaminophen (TYLENOL) tablet 650 mg, Q4H PRN  ondansetron (ZOFRAN-ODT) disintegrating tablet 4 mg, Q8H PRN   Or  ondansetron (ZOFRAN) injection 4 mg, Q6H PRN  potassium bicarb-citric acid (EFFER-K) effervescent tablet 20 mEq, Daily  glucose chewable tablet 16 g, PRN  dextrose bolus 10% 125 mL, PRN   Or  dextrose bolus 10% 250 mL, PRN  glucagon (rDNA) injection 1 mg, PRN  dextrose 5 % solution, PRN      Labs:   CBC:   Recent Labs     06/25/22 2005 06/26/22 0627 06/27/22  0725   WBC 5.6 5.0 4.4   RBC 3.44* 3.58* 3.40*   HGB 11.2* 11.4* 10.8*   HCT 33.0* 35.5* 32.9*   * 146 See Reflexed IPF Result   MPV 10.8 10.6  --       BMP:   Recent Labs     06/26/22 0627 06/27/22  0725 06/28/22  0623    138 141   K 4.1 4.5 4.2    100 100   CO2 25 24 25   BUN 13 25* 36*   CREATININE 2.77* 3.70* 3.97*   GLUCOSE 92 144* 183*   CALCIUM 8.6 8.7 9.2        Phosphorus:  No results for input(s): PHOS in the last 72 hours. Magnesium: No results for input(s): MG in the last 72 hours. Albumin:   Recent Labs     06/25/22 2005   LABALBU 3.2*       Dialysis bath: Dialysis Bath  K+ (Potassium): 3  Ca+ (Calcium): 2.25  Na+ (Sodium): 138  HCO3 (Bicarb): 35    Radiology:  Reviewed as available. Assessment:  1 ESRD:dialysis bath reviewed with nurse. Tuesday and Thursday and Saturday patient at [employer] under Dr. Nitin Waldron  2. Essential hypertension with reasonable control  3 DM: type 2  4. History of CABG with preserved LVEF  5. ANEMIA OF CHRONIC DISEASE:   6. SECONDARY HYPERPARATHYROIDISM  7. Paravertebral thoracic muscle back spasms status post fall with imaging study negative for fracture    Plan:  1. Weight removal and dialysis orders reviewed. 2.  Continue Tuesday and Thursday and Saturday dialysis per schedule  3. Follow up labs ordered. Please do not hesitate to call with questions.     Electronically signed by Princess Galdamez MD on 6/28/2022 at 11:42 AM

## 2022-06-29 LAB
ANION GAP SERPL CALCULATED.3IONS-SCNC: 16 MMOL/L (ref 9–17)
BUN BLDV-MCNC: 23 MG/DL (ref 8–23)
CALCIUM SERPL-MCNC: 8.9 MG/DL (ref 8.6–10.4)
CHLORIDE BLD-SCNC: 97 MMOL/L (ref 98–107)
CO2: 25 MMOL/L (ref 20–31)
CREAT SERPL-MCNC: 2.9 MG/DL (ref 0.5–0.9)
GFR AFRICAN AMERICAN: 20 ML/MIN
GFR NON-AFRICAN AMERICAN: 16 ML/MIN
GFR SERPL CREATININE-BSD FRML MDRD: ABNORMAL ML/MIN/{1.73_M2}
GLUCOSE BLD-MCNC: 184 MG/DL (ref 70–99)
GLUCOSE BLD-MCNC: 266 MG/DL (ref 65–105)
GLUCOSE BLD-MCNC: 345 MG/DL (ref 65–105)
GLUCOSE BLD-MCNC: 409 MG/DL (ref 65–105)
POTASSIUM SERPL-SCNC: 4 MMOL/L (ref 3.7–5.3)
SODIUM BLD-SCNC: 138 MMOL/L (ref 135–144)

## 2022-06-29 PROCEDURE — 36415 COLL VENOUS BLD VENIPUNCTURE: CPT

## 2022-06-29 PROCEDURE — 82947 ASSAY GLUCOSE BLOOD QUANT: CPT

## 2022-06-29 PROCEDURE — 6370000000 HC RX 637 (ALT 250 FOR IP): Performed by: STUDENT IN AN ORGANIZED HEALTH CARE EDUCATION/TRAINING PROGRAM

## 2022-06-29 PROCEDURE — 6370000000 HC RX 637 (ALT 250 FOR IP)

## 2022-06-29 PROCEDURE — 80048 BASIC METABOLIC PNL TOTAL CA: CPT

## 2022-06-29 PROCEDURE — 99232 SBSQ HOSP IP/OBS MODERATE 35: CPT | Performed by: INTERNAL MEDICINE

## 2022-06-29 PROCEDURE — 6370000000 HC RX 637 (ALT 250 FOR IP): Performed by: GENERAL PRACTICE

## 2022-06-29 PROCEDURE — 2580000003 HC RX 258: Performed by: EMERGENCY MEDICINE

## 2022-06-29 PROCEDURE — 6370000000 HC RX 637 (ALT 250 FOR IP): Performed by: EMERGENCY MEDICINE

## 2022-06-29 PROCEDURE — 97535 SELF CARE MNGMENT TRAINING: CPT

## 2022-06-29 PROCEDURE — 6360000002 HC RX W HCPCS

## 2022-06-29 PROCEDURE — 1200000000 HC SEMI PRIVATE

## 2022-06-29 RX ORDER — LIDOCAINE 4 G/G
1 PATCH TOPICAL DAILY
Status: DISPENSED | OUTPATIENT
Start: 2022-06-30 | End: 2022-07-01

## 2022-06-29 RX ADMIN — ATORVASTATIN CALCIUM 80 MG: 80 TABLET, FILM COATED ORAL at 21:18

## 2022-06-29 RX ADMIN — HEPARIN SODIUM 5000 UNITS: 5000 INJECTION INTRAVENOUS; SUBCUTANEOUS at 21:20

## 2022-06-29 RX ADMIN — FUROSEMIDE 80 MG: 40 TABLET ORAL at 21:20

## 2022-06-29 RX ADMIN — RANOLAZINE 1000 MG: 500 TABLET, FILM COATED, EXTENDED RELEASE ORAL at 21:17

## 2022-06-29 RX ADMIN — ASPIRIN 81 MG: 81 TABLET, CHEWABLE ORAL at 11:56

## 2022-06-29 RX ADMIN — INSULIN LISPRO 6 UNITS: 100 INJECTION, SOLUTION INTRAVENOUS; SUBCUTANEOUS at 11:59

## 2022-06-29 RX ADMIN — TAMSULOSIN HYDROCHLORIDE 0.4 MG: 0.4 CAPSULE ORAL at 11:56

## 2022-06-29 RX ADMIN — DULOXETINE HYDROCHLORIDE 30 MG: 30 CAPSULE, DELAYED RELEASE ORAL at 11:58

## 2022-06-29 RX ADMIN — DOCUSATE SODIUM 100 MG: 100 CAPSULE ORAL at 11:56

## 2022-06-29 RX ADMIN — INSULIN GLARGINE 50 UNITS: 100 INJECTION, SOLUTION SUBCUTANEOUS at 21:26

## 2022-06-29 RX ADMIN — MIRTAZAPINE 15 MG: 15 TABLET, FILM COATED ORAL at 21:17

## 2022-06-29 RX ADMIN — RANOLAZINE 1000 MG: 500 TABLET, FILM COATED, EXTENDED RELEASE ORAL at 11:56

## 2022-06-29 RX ADMIN — INSULIN LISPRO 8 UNITS: 100 INJECTION, SOLUTION INTRAVENOUS; SUBCUTANEOUS at 18:16

## 2022-06-29 RX ADMIN — GABAPENTIN 300 MG: 300 CAPSULE ORAL at 11:57

## 2022-06-29 RX ADMIN — FUROSEMIDE 80 MG: 40 TABLET ORAL at 11:56

## 2022-06-29 RX ADMIN — BACLOFEN 2.5 MG: 5 TABLET ORAL at 11:57

## 2022-06-29 RX ADMIN — CARVEDILOL 3.12 MG: 3.12 TABLET, FILM COATED ORAL at 21:18

## 2022-06-29 RX ADMIN — SODIUM BICARBONATE 650 MG: 648 TABLET ORAL at 11:57

## 2022-06-29 RX ADMIN — CARVEDILOL 3.12 MG: 3.12 TABLET, FILM COATED ORAL at 11:57

## 2022-06-29 RX ADMIN — HEPARIN SODIUM 5000 UNITS: 5000 INJECTION INTRAVENOUS; SUBCUTANEOUS at 11:57

## 2022-06-29 RX ADMIN — SODIUM CHLORIDE, PRESERVATIVE FREE 10 ML: 5 INJECTION INTRAVENOUS at 12:02

## 2022-06-29 RX ADMIN — INSULIN GLARGINE 50 UNITS: 100 INJECTION, SOLUTION SUBCUTANEOUS at 11:59

## 2022-06-29 RX ADMIN — GABAPENTIN 300 MG: 300 CAPSULE ORAL at 21:19

## 2022-06-29 RX ADMIN — BUSPIRONE HYDROCHLORIDE 15 MG: 10 TABLET ORAL at 11:58

## 2022-06-29 RX ADMIN — SODIUM CHLORIDE, PRESERVATIVE FREE 10 ML: 5 INJECTION INTRAVENOUS at 21:21

## 2022-06-29 RX ADMIN — POTASSIUM BICARBONATE 20 MEQ: 782 TABLET, EFFERVESCENT ORAL at 11:57

## 2022-06-29 RX ADMIN — BUSPIRONE HYDROCHLORIDE 15 MG: 10 TABLET ORAL at 21:19

## 2022-06-29 RX ADMIN — SODIUM BICARBONATE 650 MG: 648 TABLET ORAL at 21:17

## 2022-06-29 RX ADMIN — BACLOFEN 2.5 MG: 5 TABLET ORAL at 21:17

## 2022-06-29 RX ADMIN — DOCUSATE SODIUM 100 MG: 100 CAPSULE ORAL at 21:18

## 2022-06-29 NOTE — PROGRESS NOTES
[facility]  Internal Medicine Teaching Residency Program  Inpatient Daily Progress Note  ______________________________________________________________________________    Patient: Piotr Wheatley  YOB: 1960   HUL:[id number]    Acct: [de-identified]     Room: [facility]/0250-01  Admit date: 6/23/2022  Today's date: 06/29/22  Number of days in the hospital: 4    SUBJECTIVE   Admitting Diagnosis: Acute metabolic encephalopathy  CC:  Back muscle spasms. Pt seen and examined bedside. No acute events overnight. Labs and charts reviewed. Afebrile, hemodynamically stable, saturating well on 3 L O2 NC. Her mentation is improved and is at baseline. She feels better. PM&R recommended outpatient follow up for trigger point treatment. Planned for dialysis today as per as TTS schedule. ROS:  Constitutional:  negative for chills, fevers, sweats  Respiratory:  negative for cough, dyspnea on exertion, hemoptysis, shortness of breath, wheezing  Cardiovascular:  negative for chest pain, chest pressure/discomfort, lower extremity edema, palpitations  Gastrointestinal:  negative for abdominal pain, constipation, diarrhea, nausea, vomiting  Neurological:  negative for dizziness, headache  BRIEF HISTORY     A 66-year-old female with PMH significant of ESRD (on TTS schedule), diastolic CHF, chronic respiratory failure (on 3 L O2 via NC), CAD, morbid obesity,  - presented from dialysis center with upper back pain and muscle spasms. She reported that muscle spasms have been present chronically but they have worsened after fall 2-3 days prior to presentation. She complained of mid thoracic pain and muscle spasm. CT of the thoracic spine showed no acute fracture of thoracic spine. She was admitted under observation unit. She also complained of numbness and tingling in the left hand for last 1 week, she was noted to have diminished radial pulses.   Vascular surgery was consulted and they recommended outpatient follow-up with Dr. Bibiana Mcguire because neck steps regarding fistula function. Next day, she underwent extra session of hemodialysis. Postdialysis, she was found to be confused and oriented only to self. Neurology was consulted for concern of altered mental status and left upper extremity numbness. CT head was done which showed no acute intracranial abnormality, showed senescent changes including cortical atrophy, atherosclerotic disease of major intracranial vessels and chronic white matter changes. Next day, she underwent her scheduled dialysis session and returned to floor. She still had persistent weakness on right side and stroke alert was called. Patient had NIH score 8. Neurology recommended MRI brain and MRA head and neck without contrast, both of which were unremarkable for any acute stroke or extremity changes. EEG was ordered and she was transferred to internal medicine for further evaluation and management. OBJECTIVE     Vital Signs:  BP (!) 162/67   Pulse 77   Temp 97.6 °F (36.4 °C) (Oral)   Resp 16   Ht 5' 5\" (1.651 m)   Wt 212 lb 11.9 oz (96.5 kg)   SpO2 95%   BMI 35.40 kg/m²     Temp (24hrs), Av.5 °F (36.4 °C), Min:97.3 °F (36.3 °C), Max:97.6 °F (36.4 °C)    In: 300   Out: 2300     Physical Exam:  Constitutional: This is a well developed, well nourished, 35-39.9 - Obesity Grade II 57y.o. year old female who is alert, oriented, cooperative and in no apparent distress. Head:normocephalic and atraumatic. EENT:  PERRLA. No conjunctival injections. Septum was midline, mucosa was without erythema, exudates or cobblestoning. No thrush was noted. Neck: Supple without thyromegaly. No elevated JVP. Trachea was midline. Respiratory: Chest was symmetrical without dullness to percussion. Breath sounds bilaterally were clear to auscultation. There were no wheezes, rhonchi or rales.  There is no intercostal retraction or use of accessory muscles. No egophony noted. Cardiovascular: Regular without murmur, clicks, gallops or rubs. Abdomen: Slightly rounded and soft without organomegaly. No rebound, rigidity or guarding was appreciated. Lymphatic: No lymphadenopathy. Musculoskeletal: Normal curvature of the spine. No gross muscle weakness. Extremities:  No lower extremity edema, ulcerations, tenderness, varicosities or erythema. Muscle size, tone and strength are normal.  No involuntary movements are noted. AV fistula  Skin:  Warm and dry. Good color, turgor and pigmentation. No lesions or scars.   No cyanosis or clubbing  Neurological/Psychiatric: The patient's general behavior, level of consciousness, thought content and emotional status is normal.        Medications:  Scheduled Medications:    Baclofen  2.5 mg Oral BID    busPIRone  15 mg Oral BID    mirtazapine  15 mg Oral Nightly    DULoxetine  30 mg Oral Daily    insulin lispro  0-12 Units SubCUTAneous TID AC    gabapentin  300 mg Oral BID    sodium bicarbonate  650 mg Oral TID    atorvastatin  80 mg Oral Nightly    heparin (porcine)  5,000 Units SubCUTAneous BID    aspirin  81 mg Oral Daily    carvedilol  3.125 mg Oral BID    docusate sodium  100 mg Oral BID    furosemide  80 mg Oral BID    insulin glargine  50 Units SubCUTAneous BID    pantoprazole  40 mg Oral QAM AC    ranolazine  1,000 mg Oral BID    tamsulosin  0.4 mg Oral Daily    sodium chloride flush  5-40 mL IntraVENous 2 times per day    potassium bicarb-citric acid  20 mEq Oral Daily     Continuous Infusions:    sodium chloride      dextrose       PRN Medicationsheparin (porcine), 1,900 Units, PRN  heparin (porcine), 1,900 Units, PRN  sodium chloride flush, 5-40 mL, PRN  sodium chloride, , PRN  acetaminophen, 650 mg, Q4H PRN  ondansetron, 4 mg, Q8H PRN   Or  ondansetron, 4 mg, Q6H PRN  glucose, 4 tablet, PRN  dextrose bolus, 125 mL, PRN   Or  dextrose bolus, 250 mL, PRN  glucagon (rDNA), 1 mg, PRN  dextrose, 100 mL/hr, PRN        Diagnostic Labs:  CBC:   Recent Labs     06/27/22  0725   WBC 4.4   RBC 3.40*   HGB 10.8*   HCT 32.9*   MCV 96.8   RDW 18.5*   PLT See Reflexed IPF Result     BMP:   Recent Labs     06/27/22  0725 06/28/22  0623 06/29/22  0514    141 138   K 4.5 4.2 4.0    100 97*   CO2 24 25 25   BUN 25* 36* 23   CREATININE 3.70* 3.97* 2.90*     BNP: No results for input(s): BNP in the last 72 hours. PT/INR: No results for input(s): PROTIME, INR in the last 72 hours. APTT: No results for input(s): APTT in the last 72 hours. CARDIAC ENZYMES: No results for input(s): CKMB, CKMBINDEX, TROPONINI in the last 72 hours. Invalid input(s): CKTOTAL;3  FASTING LIPID PANEL:  Lab Results   Component Value Date    CHOL 105 04/09/2015    HDL 43 04/09/2015    TRIG 168 04/09/2015     LIVER PROFILE:   No results for input(s): AST, ALT, ALB, BILIDIR, BILITOT, ALKPHOS in the last 72 hours. MICROBIOLOGY:   Lab Results   Component Value Date/Time    CULTURE KLEBSIELLA PNEUMONIAE >213434 CFU/ML (A) 06/06/2022 05:22 PM    CULTURE  06/06/2022 05:22 PM     PRESUMPTIVE ID: GROUP D ENTEROCOCCUS 10 to 50,000 CFU/ML Susceptibility testing not performed on low colony count organisms. Imaging:    CT HEAD WO CONTRAST    Result Date: 6/25/2022  No acute intracranial abnormality. Senescent changes including cortical atrophy, atherosclerotic disease of the major intracranial vessels and chronic white matter changes. There is no significant change from prior exam.     CT CERVICAL SPINE WO CONTRAST    Result Date: 6/23/2022  No acute abnormality of the cervical spine. No fracture. Changes related to mature instrumented anterior fusion of C5-C6 and C6-C7. At C4-C5, moderate bilateral neural foraminal narrowing. At C5-C6, severe bilateral neural foraminal narrowing. RECOMMENDATIONS: Unavailable     CT THORACIC SPINE WO CONTRAST    Result Date: 6/23/2022  No acute fracture of the thoracic spine.  Dependent opacities in the partially imaged left lung which are likely due to atelectasis, though clinical correlation is required to exclude contributory infection. Trace left pleural effusion. MRA HEAD WO CONTRAST    Result Date: 6/25/2022  Brain MRI: There is no acute infarction, intracranial hemorrhage, or intracranial mass lesion. Mild chronic microangiopathic ischemic disease. Mild generalized volume loss. MRA head: No hemodynamically significant stenosis. No aneurysm. MRA neck: No hemodynamically significant stenosis RECOMMENDATIONS: Unavailable     MRA NECK WO CONTRAST    Result Date: 6/25/2022  Brain MRI: There is no acute infarction, intracranial hemorrhage, or intracranial mass lesion. Mild chronic microangiopathic ischemic disease. Mild generalized volume loss. MRA head: No hemodynamically significant stenosis. No aneurysm. MRA neck: No hemodynamically significant stenosis RECOMMENDATIONS: Unavailable     MRI BRAIN WO CONTRAST    Result Date: 6/25/2022  Brain MRI: There is no acute infarction, intracranial hemorrhage, or intracranial mass lesion. Mild chronic microangiopathic ischemic disease. Mild generalized volume loss. MRA head: No hemodynamically significant stenosis. No aneurysm.  MRA neck: No hemodynamically significant stenosis RECOMMENDATIONS: Unavailable       ASSESSMENT & PLAN       Principal Problem:    Acute metabolic encephalopathy  Active Problems:    Spasm of muscle    Low back pain    Altered mental status    Muscle spasm    Immature arteriovenous fistula (HCC)    History of fusion of cervical spine    Depression with anxiety    Atherosclerosis of coronary artery bypass graft of native heart without angina pectoris    Chronic diastolic heart failure (HCC)    Diabetic polyneuropathy associated with type 2 diabetes mellitus (HCC)    History of coronary artery bypass graft    Iron deficiency anemia    Spinal stenosis of lumbar region with neurogenic claudication    Mixed hyperlipidemia    Type twice daily  10. Hyperlipidemia:  -Continue Lipitor 80 mg nightly  11. Generalized anxiety disorder  -Buspirone-changed from 10 Mg 3 times daily to 15 mg twice daily  -Continue trazodone/Desyrel 50 mg nightly  -Added on Remeron 15 mg  -Started on duloxetine 30 Mg OD    DVT PPx: On heparin 5000 units 3 times daily  GI PPx[de-identified] On Protonix  Discharge planning:  to assist in discharge planning to SNF. Kayli Jhaveri MD   Internal Medicine Resident, PGY-1  [facility]; [place], [place]  6/29/2022, 7:17 AM    Attestation and add on       I have discussed the care of Taylor Cr , including pertinent history and exam findings,      6/29/22    with the resident. I have seen and examined the patient and the key elements of all parts of the encounter have been performed by me . I agree with the assessment, plan and orders as documented by the resident.     ---- ;     MD MOE [address]    Phone [phone]   Fax: [phone]  Answering Service: [phone]

## 2022-06-29 NOTE — PROGRESS NOTES
wipe d/t back pain. Pt transferred to shower chair and completed bathing/dressing while seated (see above for LOF). Pt transferred back to recliner, lunch tray setup, call light in reach. RN notified. Pt cries out in pain off and on throughout tx d/t muscle spasms. Pt requires increased time and assist d/t limited ROM d/t back pain. Transfers  Sit to stand: Minimal assistance  Stand to sit: Minimal assistance  Transfer Comments: w/vc's for hand placement and use of RW  Education Given To: Patient  Education Provided: Role of Therapy;Plan of Care;Transfer Training; Fall Prevention Strategies; Equipment  Education Method: Demonstration;Verbal  Barriers to Learning: None  Education Outcome: Verbalized understanding;Demonstrated understanding     AM-PAC Score  AM-PAC Inpatient Daily Activity Raw Score: 16 (06/29/22 1724)  AM-PAC Inpatient ADL T-Scale Score : 35.96 (06/29/22 1724)  ADL Inpatient CMS 0-100% Score: 53.32 (06/29/22 1724)  ADL Inpatient CMS G-Code Modifier : CK (06/29/22 1724)    Goals  Short Term Goals  Time Frame for Short term goals: pt will, by discharge:  Short Term Goal 1: Dem Mod I for bed mobility  Short Term Goal 2: Dem cga for functional transfers for daily occupations  Short Term Goal 3: Dem cga for dynamic mobility for household distances  Short Term Goal 4: Dem good safety awareness tech for all transfers/mobility  Short Term Goal 5: Dem cga for adl performance with use of AE as needed     Therapy Time   Individual Concurrent Group Co-treatment   Time In 1602         Time Out 1712         Minutes 70         Timed Code Treatment Minutes: [address], GARCIA/L

## 2022-06-29 NOTE — PROGRESS NOTES
Comprehensive Nutrition Assessment    Type and Reason for Visit:  Reassess    Nutrition Recommendations/Plan:   1. Continue current diet with Nepro ONS x 2 per day. Encourage/monitor PO intakes as tolerated. 2. Monitor labs, weights, and plan of care. Malnutrition Assessment:  Malnutrition Status: At risk for malnutrition (Comment) (06/24/22 1430)    Context:  Social/Environmental Circumstances     Findings of the 6 clinical characteristics of malnutrition:  Energy Intake:  No significant decrease in energy intake  Weight Loss:  Greater than 7.5% over 3 months     Body Fat Loss:  No significant body fat loss   Muscle Mass Loss:  No significant muscle mass loss  Fluid Accumulation:  Mild Extremities,Generalized   Strength:  Not Performed    Nutrition Assessment:    Pt reports tolerating an oral diet and eating most of her meals. Observed breakfast tray which pt ate 100% of her meal.  Pt also reports drinking Nepro shakes. Labs reviewed: Glucose 184-266 mg/dL. Meds include: Lasix, Colace. Weight fluctuations noted. Nutrition Related Findings:    meds/labs reviewed. no recorded BM since admission. Wound Type: None       Current Nutrition Intake & Therapies:    Average Meal Intake: 51-75%,%  Average Supplements Intake: 51-75%,%  ADULT ORAL NUTRITION SUPPLEMENT; Breakfast, Dinner; Renal Oral Supplement  ADULT DIET; Regular; 5 carb choices (75 gm/meal)    Anthropometric Measures:  Height: 5' 5\" (165.1 cm)  Ideal Body Weight (IBW): 125 lbs (57 kg)    Admission Body Weight: 228 lb 9.9 oz (103.7 kg)  Current Body Weight: 212 lb 11.9 oz (96.5 kg), 170.2 % IBW.  Weight Source: Bed Scale  Current BMI (kg/m2): 35.4  Usual Body Weight: 251 lb 5.2 oz (114 kg) (4/26/22; fluctuations noted)  % Weight Change (Calculated): -10.4                    BMI Categories: Obese Class 2 (BMI 35.0 -39.9)    Estimated Daily Nutrient Needs:  Energy Requirements Based On: Formula  Weight Used for Energy Requirements: Current  Energy (kcal/day): MSJ x 1.2-1.3 = 6080-0838 kcals/day  Weight Used for Protein Requirements: Ideal  Protein (g/day): 1.3-1.5 gm/kg = 75-80 gm/day  Fluid (ml/day): per MD    Nutrition Diagnosis:   · Unintended weight loss related to psychological cause or life stress as evidenced by weight loss    Nutrition Interventions:   Food and/or Nutrient Delivery: Continue Current Diet,Continue Oral Nutrition Supplement  Nutrition Education/Counseling: No recommendation at this time  Coordination of Nutrition Care: Continue to monitor while inpatient       Goals:  Previous Goal Met: Progressing toward Goal(s)  Goals: PO intake 75% or greater,prior to discharge       Nutrition Monitoring and Evaluation:   Behavioral-Environmental Outcomes: None Identified  Food/Nutrient Intake Outcomes: Food and Nutrient Intake,Supplement Intake  Physical Signs/Symptoms Outcomes: Biochemical Data,GI Status,Constipation,Fluid Status or Edema,Weight    Discharge Planning:     Too soon to determine     Jimy Martino, [address] [place] [address],   Contact: [phone]

## 2022-06-29 NOTE — DISCHARGE INSTR - COC
Continuity of Care Form    Patient Name: Lisa Huber   :  1960  MRN:  [id number]    [facility] date:  2022  Discharge date:  2022    Code Status Order: Full Code   Advance Directives:      Admitting Physician:  Werner Milan MD  PCP: AFSANEH Mathew CNP    Discharging Nurse: JOS ESTRADA [facility] Unit/Room#: 1778/4332-90  Discharging Unit Phone Number: [phone]    Emergency Contact:   Extended Emergency Contact Information  Primary Emergency Contact:  [address], [address] [place] [address] Street Phone: [phone]  Relation: Brother/Sister  Secondary Emergency Contact: Dane Nation  Home Phone: [phone]  Relation: Brother/Sister    Past Surgical History:  Past Surgical History:   Procedure Laterality Date    ANKLE FRACTURE SURGERY      AV FISTULA CREATION  2021    BACK SURGERY      BREAST SURGERY      CARDIAC SURGERY      CARPAL TUNNEL RELEASE      CHOLECYSTECTOMY, OPEN N/A     COLONOSCOPY      CORONARY ARTERY BYPASS GRAFT      x3    DIALYSIS FISTULA CREATION Left 2021    LEFT AV FISTULA CREATION UPPER EXTREMITY performed by Rosalina Nissen, MD at [address]      IR TUNNELED [address] 5 YEARS  2021    IR TUNNELED CATHETER PLACEMENT GREATER THAN 5 YEARS 2021 [facility] SPECIAL PROCEDURES    KNEE ARTHROSCOPY      right    OTHER SURGICAL HISTORY  2022    cvc exchange    TONSILLECTOMY         Immunization History:   Immunization History   Administered Date(s) Administered    COVID-19, PFIZER PURPLE top, DILUTE for use, (age 15 y+), 30mcg/0.3mL 2021, 2021    Influenza Virus Vaccine 10/18/2018, 09/10/2019, 2020    Influenza, MDCK Quadv, IM, PF (Flucelvax 2 yrs and older) 2021    Influenza, Quadv, IM, (6 mo and older Fluzone, Flulaval, Fluarix and 3 yrs and older Afluria) 10/16/2017    Influenza, Quadv, IM, PF (6 mo and older Fluzone, Flulaval, Fluarix, and 3 yrs and older Afluria) 10/18/2018, 09/10/2019, 09/20/2019, 11/03/2020    Pneumococcal Conjugate 13-valent (Dryivhy27) 08/06/2019    Pneumococcal Polysaccharide (Bucklnwnj65) 10/30/2014    Tdap (Boostrix, Adacel) 11/18/2017       Active Problems:  Patient Active Problem List   Diagnosis Code    Atherosclerosis of coronary artery bypass graft of native heart without angina pectoris I25.810    Chronic diastolic heart failure (HCC) I50.32    Diabetic polyneuropathy associated with type 2 diabetes mellitus ([facility]) E11.42    History of coronary artery bypass graft Z95.1    Iron deficiency anemia D50.9    Spinal stenosis of lumbar region with neurogenic claudication M48.062    Mixed hyperlipidemia E78.2    Type 2 diabetes 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, with long-term current use of insulin ([facility]) E11.22, N18.6, Z99.2, Z79.4    Obesity, Class II, BMI 35-39.9 E66.9    Thyroid nodule greater than or equal to 1 cm in diameter incidentally noted on imaging study E04.1    Essential hypertension I10    Chronic ischemic heart disease I25.9    Ischemic stroke of frontal lobe ([facility]) I63.9    Morbid obesity with BMI of 40.0-44.9, adult ([facility]) E66.01, Z68.41    Disequilibrium syndrome E87.8    Generalized weakness R53.1    Long-term memory loss R41.3    Muscle right arm weakness M62.81    Anxiety F41.9    Chronic midline low back pain with bilateral sciatica M54.41, M54.42, G89.29    AMS (altered mental status) R41.82    Tremor R25.1    COVID U07.1    ESRD (end stage renal disease) ([facility] N18.6    Diabetic ketoacidosis without coma associated with type 2 diabetes mellitus ([facility]) E11.10    Hypokalemia E87.6    Confusion R41.0    Myoclonic jerking G25.3    Hyperglycemia B77.2    Acute metabolic encephalopathy F22.12    Cerebral hypoperfusion I67.81    Spasm of muscle M62.838    Low back pain M54.50    Altered mental status R41.82    Muscle spasm M62.838    Immature arteriovenous fistula (HCC) I77.0    History of fusion of cervical spine Z98.1    Depression with anxiety F41.8       Isolation/Infection:   Isolation            Contact          Patient Infection Status       Infection Onset Added Last Indicated Last Indicated By Review Planned Expiration Resolved Resolved By    VRE 22 Culture, Urine        MRSA 20 Mallory Peterson RN        Added from external infection. Resolved    COVID-19 (Rule Out) 22 COVID-19, Rapid (Ordered)   22 Rule-Out Test Resulted    COVID-19 (Rule Out) 22 COVID-19 & Influenza Combo (Ordered)   22 Rule-Out Test Resulted    COVID-19 (Rule Out) 22 COVID-19 & Influenza Combo (Ordered)   04/15/22 Fabricio De RN    COVID-19 (Rule Out) 22 COVID-19 & Influenza Combo (Ordered)   22 Rule-Out Test Resulted    COVID-19 (Rule Out) 22 COVID-19 (Ordered)   22 Rule-Out Test Resulted    COVID-19 22 COVID-19   22     COVID-19 (Rule Out) 22 COVID-19 (Ordered)   22 Rule-Out Test Resulted            Nurse Assessment:  Last Vital Signs: BP (!) 162/67   Pulse 77   Temp 97.6 °F (36.4 °C) (Oral)   Resp 16   Ht 5' 5\" (1.651 m)   Wt 212 lb 11.9 oz (96.5 kg)   SpO2 95%   BMI 35.40 kg/m²     Last documented pain score (0-10 scale): Pain Level: 10  Last Weight:   Wt Readings from Last 1 Encounters:   22 212 lb 11.9 oz (96.5 kg)     Mental Status:  oriented and alert    IV Access:  - None    Nursing Mobility/ADLs:  Walking   Assisted  Transfer  Assisted  Bathing  Assisted  Dressing  Independent  Toileting  Independent  Feeding  Independent  Med Admin  Independent  Med Delivery   whole    Wound Care Documentation and Therapy:        Elimination:  Continence:    Bowel: No  Bladder: No  Urinary Catheter: None   Colostomy/Ileostomy/Ileal Conduit: No       Date of Last BM: 6/30/2022  No intake or output data in the 24 hours ending 06/29/22 1848    I/O last 3 completed shifts: In: 300   Out: 2300     Safety Concerns:     History of Falls (last 30 days) and At Risk for Falls    Impairments/Disabilities:      None    Nutrition Therapy:  Current Nutrition Therapy:   - Oral Diet:  Carb Control 5 carbs/meal (2000kcals/day)    Routes of Feeding: Oral  Liquids: No Restrictions  Daily Fluid Restriction: yes - amount 1800  Last Modified Barium Swallow with Video (Video Swallowing Test): not done    Treatments at the Time of Hospital Discharge:   Respiratory Treatments: see MAR  Oxygen Therapy:  is not on home oxygen therapy. Ventilator:    - No ventilator support    Rehab Therapies: Physical Therapy and Occupational Therapy  Weight Bearing Status/Restrictions: No weight bearing restrictions  Other Medical Equipment (for information only, NOT a DME order):  walker, bath bench, and bedside commode  Other Treatments: none  Dr Caridad Khanna rehab Rehab Recommendation: Patient awaiting placement at SNF. Will trial transition to routine baclofen for spasm - renally dosed. Will schedule patient for outpatient trigger point injections in clinic. Can also consider TENS unit for pain/spasm. Already on renal dose of gabapentin. Other medications (Buspar, duloxetine, Remeron) may limit trial of amitriptyline for nerve pain. Will follow.     Patient's personal belongings (please select all that are sent with patient):  None    RN SIGNATURE:  Electronically signed by Nancy Schwartz RN on 6/29/22 at 6:48 PM EDT    CASE MANAGEMENT/SOCIAL WORK SECTION    Inpatient Status Date: ***    Readmission Risk Assessment Score:  Readmission Risk              Risk of Unplanned Readmission:  53           Discharging to Facility/ Agency   Name: [facility]  Address:  [address] [place] [address]         Phone: [phone]       Fax: [phone]        Phone:  Fax:    Dialysis Facility (if applicable)

## 2022-06-29 NOTE — DISCHARGE INSTR - DIET

## 2022-06-29 NOTE — CARE COORDINATION
Transition planning  Called and spoke with Karyn Sands at [facility], she states they are still waiting for precert.

## 2022-06-29 NOTE — PLAN OF CARE
Problem: Chronic Conditions and Co-morbidities  Goal: Patient's chronic conditions and co-morbidity symptoms are monitored and maintained or improved  Outcome: Progressing     Problem: Skin/Tissue Integrity  Goal: Absence of new skin breakdown  Description: 1. Monitor for areas of redness and/or skin breakdown  2. Assess vascular access sites hourly  3. Every 4-6 hours minimum:  Change oxygen saturation probe site  4. Every 4-6 hours:  If on nasal continuous positive airway pressure, respiratory therapy assess nares and determine need for appliance change or resting period.   Outcome: Progressing     Problem: Safety - Adult  Goal: Free from fall injury  Outcome: Progressing  Flowsheets (Taken 6/28/2022 2223)  Free From Fall Injury: Instruct family/caregiver on patient safety     Problem: Pain  Goal: Verbalizes/displays adequate comfort level or baseline comfort level  Outcome: Progressing

## 2022-06-30 LAB
ANION GAP SERPL CALCULATED.3IONS-SCNC: 14 MMOL/L (ref 9–17)
BUN BLDV-MCNC: 38 MG/DL (ref 8–23)
CALCIUM SERPL-MCNC: 8.9 MG/DL (ref 8.6–10.4)
CHLORIDE BLD-SCNC: 95 MMOL/L (ref 98–107)
CO2: 26 MMOL/L (ref 20–31)
CREAT SERPL-MCNC: 3.6 MG/DL (ref 0.5–0.9)
GFR AFRICAN AMERICAN: 15 ML/MIN
GFR NON-AFRICAN AMERICAN: 13 ML/MIN
GFR SERPL CREATININE-BSD FRML MDRD: ABNORMAL ML/MIN/{1.73_M2}
GLUCOSE BLD-MCNC: 189 MG/DL (ref 65–105)
GLUCOSE BLD-MCNC: 297 MG/DL (ref 65–105)
GLUCOSE BLD-MCNC: 311 MG/DL (ref 70–99)
GLUCOSE BLD-MCNC: 344 MG/DL (ref 65–105)
GLUCOSE BLD-MCNC: 352 MG/DL (ref 65–105)
POTASSIUM SERPL-SCNC: 4.1 MMOL/L (ref 3.7–5.3)
SODIUM BLD-SCNC: 135 MMOL/L (ref 135–144)

## 2022-06-30 PROCEDURE — 6370000000 HC RX 637 (ALT 250 FOR IP)

## 2022-06-30 PROCEDURE — 6370000000 HC RX 637 (ALT 250 FOR IP): Performed by: EMERGENCY MEDICINE

## 2022-06-30 PROCEDURE — 82947 ASSAY GLUCOSE BLOOD QUANT: CPT

## 2022-06-30 PROCEDURE — 6360000002 HC RX W HCPCS

## 2022-06-30 PROCEDURE — 99232 SBSQ HOSP IP/OBS MODERATE 35: CPT | Performed by: INTERNAL MEDICINE

## 2022-06-30 PROCEDURE — 90935 HEMODIALYSIS ONE EVALUATION: CPT

## 2022-06-30 PROCEDURE — 02HV33Z INSERTION OF INFUSION DEVICE INTO SUPERIOR VENA CAVA, PERCUTANEOUS APPROACH: ICD-10-PCS | Performed by: INTERNAL MEDICINE

## 2022-06-30 PROCEDURE — 97530 THERAPEUTIC ACTIVITIES: CPT

## 2022-06-30 PROCEDURE — 36415 COLL VENOUS BLD VENIPUNCTURE: CPT

## 2022-06-30 PROCEDURE — 2700000000 HC OXYGEN THERAPY PER DAY

## 2022-06-30 PROCEDURE — 6370000000 HC RX 637 (ALT 250 FOR IP): Performed by: INTERNAL MEDICINE

## 2022-06-30 PROCEDURE — 90935 HEMODIALYSIS ONE EVALUATION: CPT | Performed by: INTERNAL MEDICINE

## 2022-06-30 PROCEDURE — 6370000000 HC RX 637 (ALT 250 FOR IP): Performed by: STUDENT IN AN ORGANIZED HEALTH CARE EDUCATION/TRAINING PROGRAM

## 2022-06-30 PROCEDURE — 6370000000 HC RX 637 (ALT 250 FOR IP): Performed by: GENERAL PRACTICE

## 2022-06-30 PROCEDURE — 1200000000 HC SEMI PRIVATE

## 2022-06-30 PROCEDURE — 80048 BASIC METABOLIC PNL TOTAL CA: CPT

## 2022-06-30 RX ORDER — GABAPENTIN 100 MG/1
100 CAPSULE ORAL 2 TIMES DAILY
Status: DISCONTINUED | OUTPATIENT
Start: 2022-06-30 | End: 2022-07-01 | Stop reason: HOSPADM

## 2022-06-30 RX ORDER — INSULIN LISPRO 100 [IU]/ML
3 INJECTION, SOLUTION INTRAVENOUS; SUBCUTANEOUS
Status: DISCONTINUED | OUTPATIENT
Start: 2022-06-30 | End: 2022-07-01 | Stop reason: HOSPADM

## 2022-06-30 RX ORDER — INSULIN GLARGINE 100 [IU]/ML
55 INJECTION, SOLUTION SUBCUTANEOUS 2 TIMES DAILY
Status: DISCONTINUED | OUTPATIENT
Start: 2022-06-30 | End: 2022-07-01 | Stop reason: HOSPADM

## 2022-06-30 RX ADMIN — SODIUM BICARBONATE 650 MG: 648 TABLET ORAL at 13:20

## 2022-06-30 RX ADMIN — DULOXETINE HYDROCHLORIDE 30 MG: 30 CAPSULE, DELAYED RELEASE ORAL at 13:20

## 2022-06-30 RX ADMIN — TAMSULOSIN HYDROCHLORIDE 0.4 MG: 0.4 CAPSULE ORAL at 13:20

## 2022-06-30 RX ADMIN — HEPARIN SODIUM 5000 UNITS: 5000 INJECTION INTRAVENOUS; SUBCUTANEOUS at 20:51

## 2022-06-30 RX ADMIN — BUSPIRONE HYDROCHLORIDE 15 MG: 10 TABLET ORAL at 13:20

## 2022-06-30 RX ADMIN — RANOLAZINE 1000 MG: 500 TABLET, FILM COATED, EXTENDED RELEASE ORAL at 13:20

## 2022-06-30 RX ADMIN — MIRTAZAPINE 15 MG: 15 TABLET, FILM COATED ORAL at 20:52

## 2022-06-30 RX ADMIN — DOCUSATE SODIUM 100 MG: 100 CAPSULE ORAL at 13:20

## 2022-06-30 RX ADMIN — CARVEDILOL 3.12 MG: 3.12 TABLET, FILM COATED ORAL at 13:20

## 2022-06-30 RX ADMIN — FUROSEMIDE 80 MG: 40 TABLET ORAL at 13:20

## 2022-06-30 RX ADMIN — INSULIN LISPRO 3 UNITS: 100 INJECTION, SOLUTION INTRAVENOUS; SUBCUTANEOUS at 13:24

## 2022-06-30 RX ADMIN — INSULIN LISPRO 3 UNITS: 100 INJECTION, SOLUTION INTRAVENOUS; SUBCUTANEOUS at 17:12

## 2022-06-30 RX ADMIN — INSULIN LISPRO 2 UNITS: 100 INJECTION, SOLUTION INTRAVENOUS; SUBCUTANEOUS at 13:22

## 2022-06-30 RX ADMIN — CARVEDILOL 3.12 MG: 3.12 TABLET, FILM COATED ORAL at 20:51

## 2022-06-30 RX ADMIN — ASPIRIN 81 MG: 81 TABLET, CHEWABLE ORAL at 13:20

## 2022-06-30 RX ADMIN — INSULIN LISPRO 6 UNITS: 100 INJECTION, SOLUTION INTRAVENOUS; SUBCUTANEOUS at 08:14

## 2022-06-30 RX ADMIN — POTASSIUM BICARBONATE 20 MEQ: 782 TABLET, EFFERVESCENT ORAL at 13:20

## 2022-06-30 RX ADMIN — GABAPENTIN 100 MG: 100 CAPSULE ORAL at 20:51

## 2022-06-30 RX ADMIN — RANOLAZINE 1000 MG: 500 TABLET, FILM COATED, EXTENDED RELEASE ORAL at 20:51

## 2022-06-30 RX ADMIN — BACLOFEN 2.5 MG: 5 TABLET ORAL at 13:20

## 2022-06-30 RX ADMIN — HEPARIN SODIUM 5000 UNITS: 5000 INJECTION INTRAVENOUS; SUBCUTANEOUS at 13:20

## 2022-06-30 RX ADMIN — BUSPIRONE HYDROCHLORIDE 15 MG: 10 TABLET ORAL at 20:51

## 2022-06-30 RX ADMIN — FUROSEMIDE 80 MG: 40 TABLET ORAL at 17:11

## 2022-06-30 RX ADMIN — INSULIN GLARGINE 55 UNITS: 100 INJECTION, SOLUTION SUBCUTANEOUS at 08:13

## 2022-06-30 RX ADMIN — INSULIN GLARGINE 55 UNITS: 100 INJECTION, SOLUTION SUBCUTANEOUS at 20:47

## 2022-06-30 RX ADMIN — DOCUSATE SODIUM 100 MG: 100 CAPSULE ORAL at 20:51

## 2022-06-30 RX ADMIN — INSULIN LISPRO 3 UNITS: 100 INJECTION, SOLUTION INTRAVENOUS; SUBCUTANEOUS at 08:16

## 2022-06-30 RX ADMIN — ATORVASTATIN CALCIUM 80 MG: 80 TABLET, FILM COATED ORAL at 20:51

## 2022-06-30 RX ADMIN — BACLOFEN 2.5 MG: 5 TABLET ORAL at 20:51

## 2022-06-30 RX ADMIN — SODIUM BICARBONATE 650 MG: 648 TABLET ORAL at 20:51

## 2022-06-30 RX ADMIN — INSULIN LISPRO 10 UNITS: 100 INJECTION, SOLUTION INTRAVENOUS; SUBCUTANEOUS at 17:10

## 2022-06-30 NOTE — PROGRESS NOTES
Nephrology Progress Note        Subjective: Patient was seen and examined on hemodialysis. Hemodialysis orders were reviewed with dialysis nurses  Patient states that she is feeling better. Strength on her right side are slowly improving. She denies any nausea or vomiting. Will remove 4 L of fluid with dialysis      Objective:  CURRENT TEMPERATURE:  Temp: 98.1 °F (36.7 °C)  MAXIMUM TEMPERATURE OVER 24HRS:  Temp (24hrs), Av °F (36.7 °C), Min:97.8 °F (36.6 °C), Max:98.2 °F (36.8 °C)    CURRENT RESPIRATORY RATE:  Resp: 18  CURRENT PULSE:  Heart Rate: 76  CURRENT BLOOD PRESSURE:  BP: 126/60  24HR BLOOD PRESSURE RANGE:  Systolic (49URY), EIS:760 , Min:115 , RME:619   ; Diastolic (85QLP), SGE:00, Min:54, Max:79    24HR INTAKE/OUTPUT:  No intake or output data in the 24 hours ending 22 1218  Patient Vitals for the past 96 hrs (Last 3 readings):   Weight   22 0845 220 lb 3.8 oz (99.9 kg)   22 1137 212 lb 11.9 oz (96.5 kg)   22 0831 217 lb 2.5 oz (98.5 kg)         Physical Exam:  General appearance: Alert and awake x3  Skin: To touch and no erythema  ENT: Moist mucous membranes  Neck: No JVD or carotid bruit  Pulmonary: Bilateral air entry and clear  Cardiovascular: S1 and S2 audible no S3  Abdomen: soft nontender, bowel sounds present, no ascites   Extremities: Trace edema    Access:   As previous    Current Medications:    insulin lispro (HUMALOG) injection vial 3 Units, TID WC  insulin glargine (LANTUS) injection vial 55 Units, BID  lidocaine 4 % external patch 1 patch, Daily  Baclofen (LIORESAL) tablet 2.5 mg, BID  busPIRone (BUSPAR) tablet 15 mg, BID  mirtazapine (REMERON) tablet 15 mg, Nightly  DULoxetine (CYMBALTA) extended release capsule 30 mg, Daily  insulin lispro (HUMALOG) injection vial 0-12 Units, TID AC  gabapentin (NEURONTIN) capsule 300 mg, BID  sodium bicarbonate tablet 650 mg, TID  atorvastatin (LIPITOR) tablet 80 mg, Nightly  heparin (porcine) injection 1,900 Units, PRN  heparin (porcine) injection 1,900 Units, PRN  heparin (porcine) injection 5,000 Units, BID  aspirin chewable tablet 81 mg, Daily  carvedilol (COREG) tablet 3.125 mg, BID  docusate sodium (COLACE) capsule 100 mg, BID  furosemide (LASIX) tablet 80 mg, BID  pantoprazole (PROTONIX) tablet 40 mg, QAM AC  ranolazine (RANEXA) extended release tablet 1,000 mg, BID  tamsulosin (FLOMAX) capsule 0.4 mg, Daily  sodium chloride flush 0.9 % injection 5-40 mL, 2 times per day  sodium chloride flush 0.9 % injection 5-40 mL, PRN  0.9 % sodium chloride infusion, PRN  acetaminophen (TYLENOL) tablet 650 mg, Q4H PRN  ondansetron (ZOFRAN-ODT) disintegrating tablet 4 mg, Q8H PRN   Or  ondansetron (ZOFRAN) injection 4 mg, Q6H PRN  potassium bicarb-citric acid (EFFER-K) effervescent tablet 20 mEq, Daily  glucose chewable tablet 16 g, PRN  dextrose bolus 10% 125 mL, PRN   Or  dextrose bolus 10% 250 mL, PRN  glucagon (rDNA) injection 1 mg, PRN  dextrose 5 % solution, PRN      Labs:   BMP:   Recent Labs     06/28/22  0623 06/29/22  0514 06/30/22  0438    138 135   K 4.2 4.0 4.1    97* 95*   CO2 25 25 26   BUN 36* 23 38*   CREATININE 3.97* 2.90* 3.60*   GLUCOSE 183* 184* 311*   CALCIUM 9.2 8.9 8.9        Dialysis bath: Dialysis Bath  K+ (Potassium): 3  Ca+ (Calcium): 2.25  Na+ (Sodium): 140  HCO3 (Bicarb): 35    Radiology:  Reviewed as available. Assessment:  1 end-stage renal disease on hemodialysis. Regular dialysis days are Tuesday Thursday Saturdays. Patient was seen and examined on hemodialysis she is tolerating procedure fairly well. 2.Essential hypertension with good control of blood pressure  3 right-sided weakness concerning for stroke neurology is on board 4. History of CABG with preserved LVEF  5. ANEMIA OF CHRONIC DISEASE:   6. SECONDARY HYPERPARATHYROIDISM  7. Paravertebral thoracic muscle back spasms status post fall with imaging study negative for fracture    Plan:  1. This is as ordered  2.   CBC and BMP in a.m. 3.  Continue same medications  4. Decrease gabapentin to 100 mg twice daily  Please do not hesitate to call with questions.     Electronically signed by Aleyda Zazueta MD on 6/30/2022 at 12:18 PM

## 2022-06-30 NOTE — PROGRESS NOTES
Dialysis Time Out  To be done by RN and tech or 2 RNs  Staff Names Sirisha MEJÍA RN and Ravi Willis RN    [x]  Identity of the patient using 2 patient identifiers  [x]  Consent for treatment  [x]  Equipment-proper machine and dialyzer  [x]  B-Hep B status  [x]  Orders- to include bath, blood flow, dialyzer, time and fluid removal  [x]  Access-Correct site and in working order  [x]  Time for patient to ask questions.

## 2022-06-30 NOTE — PROGRESS NOTES
Dialysis Post Treatment Note  Vitals:    06/30/22 1245   BP: 103/70   Pulse: 81   Resp: 16   Temp: 98.1 °F (36.7 °C)   SpO2:      Pre-Weight = 99.9kg  Post-weight = Weight: 207 lb 3.7 oz (94 kg)  Total Liters Processed = Total Liters Processed (l/min): 70.8 l/min  Rinseback Volume (mL) = Rinseback Volume (ml): 260 ml  Net Removal (mL) =3740    Patient's dry weight=Not established  Type of access used= Left CVC  Length of treatment=210    Tolerated treatment well. No adverse events during treatment. Catheter positional throughout treatment having to be flushed oftena dn adjusted. Rate had to be decreased to 350 at one point, but then able to increase after power flush. Dialyzer heavily streaked/clotted at end of treatment. Pt returned to room via stretcher.

## 2022-06-30 NOTE — PROGRESS NOTES
Physical Therapy  Facility/Department: [address] ORTHO/MED SURG  Daily Treatment Note  NAME: Jessica Marin  : 1960  MRN: [id number]    Date of Service: 2022  Chief Complaint   Patient presents with    Back Pain     upper, [place] last week     Discharge Recommendations:  Patient would benefit from continued therapy after discharge   PT Equipment Recommendations  Equipment Needed: No    Patient Diagnosis(es): The primary encounter diagnosis was Spasm of muscle. Diagnoses of Muscle spasm and Hypokalemia were also pertinent to this visit. Past Medical History:  has a past medical history of Arthritis, Backache, unspecified, Cerebral artery occlusion with cerebral infarction ([address] CHF (congestive heart failure) ([address] Chronic kidney disease, Coronary atherosclerosis of artery bypass graft, COVID, Cramp of limb, Gallstones, Hemodialysis patient ([address] Hyperlipidemia, Hypertension, Insomnia, Neuromuscular disorder ([facility] Pneumonia, Psychiatric problem, Thyroid disease, Type II or unspecified type diabetes mellitus with renal manifestations, not stated as uncontrolled(250.40), Type II or unspecified type diabetes mellitus without mention of complication, not stated as uncontrolled, and Unspecified vitamin D deficiency. Past Surgical History:  has a past surgical history that includes Coronary artery bypass graft; Knee arthroscopy; Carpal tunnel release; Breast surgery; Tonsillectomy; Hand surgery; Ankle fracture surgery; Cholecystectomy, open (N/A); IR TUNNELED CVC PLACE WO SQ PORT/PUMP > 5 YEARS (2021); AV fistula creation (2021); Dialysis fistula creation (Left, 2021); other surgical history (2022); back surgery; Colonoscopy; eye surgery; fracture surgery; and Cardiac surgery. Assessment   Assessment: Pt continue to have gait, balance and strength deficits and is a high fall risk.  Pt demonstrates improved bed mobilty to SBA, sit <> stand Gigi, poor abilty to ambulated due to bilateral LE buckeling and high fall risk. Activity Tolerance: Patient limited by pain  Equipment Needed: No     Plan    Plan  Plan: 5-7 times per week  Specific Instructions for Next Treatment: progress activity as indicated and toleratedl  Current Treatment Recommendations: Strengthening;ROM;Balance training;Functional mobility training;Transfer training; Endurance training;Gait training;Neuromuscular re-education;Stair training     Restrictions  Restrictions/Precautions  Restrictions/Precautions: Contact Precautions,Up as Tolerated,Fall Risk  Required Braces or Orthoses?: No  Position Activity Restriction  Other position/activity restrictions: HD-T,Th,Sat     Subjective    Subjective  Subjective: Patient laying in bed upon arrival. Pt agreeable to PT at this time okay to see prior to HD, with assist to stretcher for HD. tx limited in AM due to pt going to HD  Pain: no pain reported  Orientation  Overall Orientation Status: Within Functional Limits  Orientation Level: Oriented X4  Cognition  Overall Cognitive Status: WFL  Arousal/Alertness: Appropriate responses to stimuli  Following Commands: Follows one step commands with increased time  Attention Span: Attends with cues to redirect  Memory: Decreased recall of biographical Information  Safety Judgement: Decreased awareness of need for assistance  Problem Solving: Assistance required to generate solutions  Insights: Decreased awareness of deficits  Initiation: Requires cues for some  Sequencing: Requires cues for some  Cognition Comment: Pt report memory deficits     Objective        Bed Mobility Training  Bed Mobility Training: Yes  Overall Level of Assistance: Supervision  Interventions: Verbal cues; Tactile cues  Rolling: Supervision  Supine to Sit: Supervision  Sit to Supine: Supervision  Scooting: Stand-by assistance  Balance  Sitting: Without support  Standing: With support  Transfer Training  Transfer Training: Yes  Overall Level of Assistance:  Moderate assistance  Interventions: Verbal cues; Safety awareness training; Tactile cues; Visual cues; Weight shifting training/pressure relief; Other (comment) (instruction on hand and foot placement with mobilty)  Sit to Stand: Minimum assistance; Additional time;Assist X1;Other (comment) (ROMAIN mon, giving way abrupt need to sit x 5 trials)  Stand to Sit: Minimum assistance; Additional time;Assist X1  Stand Pivot Transfers: Minimum assistance;Assist X1  Bed to Chair: Minimum assistance;Assist X1;Other (comment) ([place] mon x multiple trials, poor standing balance)  Gait Training  Gait Training: Yes  Gait  Overall Level of Assistance: Minimum assistance;Assist X1  Interventions: Safety awareness training;Visual cues; Verbal cues;Manual cues; Tactile cues  Base of Support: Widened  Speed/Annie: Slow  Step Length: Left shortened  Gait Abnormalities: Shuffling gait; Decreased step clearance  Distance (ft): 5 Feet  Assistive Device: Walker, rolling (Gait training completed with focus on hand and foot placement with walker and focus on fully estending and locking knees with gait.)         AM-PAC Inpatient Mobility without Stair Climbing Raw Score : 13 (06/30/22 8221)     Safety Devices  Type of Devices: Bed alarm in place;Gait belt;Call light within reach;None;Nurse notified (Pt transfered to stretcher going to HD this AM)       Goals  Short Term Goals  Time Frame for Short term goals: 5 visits  Short term goal 1: Pt to STS with AD and MOD I  Short term goal 2: Pt to ambulate 25 ft RW CGA  Short term goal 3: Pt to tolerate 30 minutes functional activity and exercises to improve endurance. Long Term Goals  Time Frame for Long term goals : Pt to ambulate 50ft RW SBA  Patient Goals   Patient goals : to be able to ambulate with walker    Education  Patient Education  Education Given To: Patient  Education Provided: Role of Therapy;Plan of Care;Precautions;Transfer Training; Fall Prevention Strategies  Education Provided Comments: Pt educated on fall risk, safety with mobilty and posture  Education Method: Demonstration;Verbal  Barriers to Learning: Cognition  Education Outcome: Verbalized understanding;Continued education needed; Other (Comment) (Pt is impulsive with mobilty and a high fall risk)    Therapy Time   Individual Concurrent Group Co-treatment   Time In 0815         Time Out 0831         Minutes 16         Timed Code Treatment Minutes: [address] [place] [address]       [facility] PT, DPT

## 2022-06-30 NOTE — PROGRESS NOTES
[facility]  Internal Medicine Teaching Residency Program  Inpatient Daily Progress Note  ______________________________________________________________________________    Patient: Hayden Trevizo  YOB: 1960   CXA:[id number]    Acct: [de-identified]     Room: [address] [place] [address]  Admit date: 6/23/2022  Today's date: 06/30/22  Number of days in the hospital: 5    SUBJECTIVE   Admitting Diagnosis: Acute metabolic encephalopathy  CC:  Back muscle spasms. Pt seen and examined in hemodialysis  No acute events overnight. Labs and charts reviewed. Afebrile, hemodynamically stable, saturating well on 3 L O2 NC. No change in clinical condition. She feels better. Has muscle spasms. Dialysis today as per her TTS schedule. She has hyperglycemia, insulin adjusted. Awaiting placement. ROS:  Constitutional:  negative for chills, fevers, sweats  Respiratory:  negative for cough, dyspnea on exertion, hemoptysis, shortness of breath, wheezing  Cardiovascular:  negative for chest pain, chest pressure/discomfort, lower extremity edema, palpitations  Gastrointestinal:  negative for abdominal pain, constipation, diarrhea, nausea, vomiting  Neurological:  negative for dizziness, headache  BRIEF HISTORY     A 77-year-old female with PMH significant of ESRD (on TTS schedule), diastolic CHF, chronic respiratory failure (on 3 L O2 via NC), CAD, morbid obesity,  - presented from dialysis center with upper back pain and muscle spasms. She reported that muscle spasms have been present chronically but they have worsened after fall 2-3 days prior to presentation. She complained of mid thoracic pain and muscle spasm. CT of the thoracic spine showed no acute fracture of thoracic spine. She was admitted under observation unit. She also complained of numbness and tingling in the left hand for last 1 week, she was noted to have diminished radial pulses.   Vascular surgery was consulted and they recommended outpatient follow-up with Dr. Yanique Lorenz because neck steps regarding fistula function. Next day, she underwent extra session of hemodialysis. Postdialysis, she was found to be confused and oriented only to self. Neurology was consulted for concern of altered mental status and left upper extremity numbness. CT head was done which showed no acute intracranial abnormality, showed senescent changes including cortical atrophy, atherosclerotic disease of major intracranial vessels and chronic white matter changes. Next day, she underwent her scheduled dialysis session and returned to floor. She still had persistent weakness on right side and stroke alert was called. Patient had NIH score 8. Neurology recommended MRI brain and MRA head and neck without contrast, both of which were unremarkable for any acute stroke or extremity changes. EEG was ordered and she was transferred to internal medicine for further evaluation and management. OBJECTIVE     Vital Signs:  BP (!) 135/56   Pulse 92   Temp 97.8 °F (36.6 °C) (Oral)   Resp 16   Ht 5' 5\" (1.651 m)   Wt 212 lb 11.9 oz (96.5 kg)   SpO2 95%   BMI 35.40 kg/m²     Temp (24hrs), Av.8 °F (36.6 °C), Min:97.8 °F (36.6 °C), Max:97.8 °F (36.6 °C)    No intake/output data recorded. Physical Exam:  Constitutional: This is a well developed, well nourished, 35-39.9 - Obesity Grade II 57y.o. year old female who is alert, oriented, cooperative and in no apparent distress. Head:normocephalic and atraumatic. EENT:  PERRLA. No conjunctival injections. Septum was midline, mucosa was without erythema, exudates or cobblestoning. No thrush was noted. Neck: Supple without thyromegaly. No elevated JVP. Trachea was midline. Respiratory: Chest was symmetrical without dullness to percussion. Breath sounds bilaterally were clear to auscultation. There were no wheezes, rhonchi or rales.  There is no intercostal retraction or use of accessory muscles. No egophony noted. Cardiovascular: Regular without murmur, clicks, gallops or rubs. Abdomen: Slightly rounded and soft without organomegaly. No rebound, rigidity or guarding was appreciated. Lymphatic: No lymphadenopathy. Musculoskeletal: Normal curvature of the spine. No gross muscle weakness. Extremities:  No lower extremity edema, ulcerations, tenderness, varicosities or erythema. Muscle size, tone and strength are normal.  No involuntary movements are noted. AV fistula  Skin:  Warm and dry. Good color, turgor and pigmentation. No lesions or scars.   No cyanosis or clubbing  Neurological/Psychiatric: The patient's general behavior, level of consciousness, thought content and emotional status is normal.        Medications:  Scheduled Medications:    lidocaine  1 patch TransDERmal Daily    Baclofen  2.5 mg Oral BID    busPIRone  15 mg Oral BID    mirtazapine  15 mg Oral Nightly    DULoxetine  30 mg Oral Daily    insulin lispro  0-12 Units SubCUTAneous TID AC    gabapentin  300 mg Oral BID    sodium bicarbonate  650 mg Oral TID    atorvastatin  80 mg Oral Nightly    heparin (porcine)  5,000 Units SubCUTAneous BID    aspirin  81 mg Oral Daily    carvedilol  3.125 mg Oral BID    docusate sodium  100 mg Oral BID    furosemide  80 mg Oral BID    insulin glargine  50 Units SubCUTAneous BID    pantoprazole  40 mg Oral QAM AC    ranolazine  1,000 mg Oral BID    tamsulosin  0.4 mg Oral Daily    sodium chloride flush  5-40 mL IntraVENous 2 times per day    potassium bicarb-citric acid  20 mEq Oral Daily     Continuous Infusions:    sodium chloride      dextrose       PRN Medicationsheparin (porcine), 1,900 Units, PRN  heparin (porcine), 1,900 Units, PRN  sodium chloride flush, 5-40 mL, PRN  sodium chloride, , PRN  acetaminophen, 650 mg, Q4H PRN  ondansetron, 4 mg, Q8H PRN   Or  ondansetron, 4 mg, Q6H PRN  glucose, 4 tablet, PRN  dextrose bolus, 125 mL, PRN   Or  dextrose bolus, 250 mL, PRN  glucagon (rDNA), 1 mg, PRN  dextrose, 100 mL/hr, PRN        Diagnostic Labs:  CBC:   No results for input(s): WBC, RBC, HGB, HCT, MCV, RDW, PLT in the last 72 hours. BMP:   Recent Labs     06/28/22  0623 06/29/22  0514 06/30/22  0438    138 135   K 4.2 4.0 4.1    97* 95*   CO2 25 25 26   BUN 36* 23 38*   CREATININE 3.97* 2.90* 3.60*     BNP: No results for input(s): BNP in the last 72 hours. PT/INR: No results for input(s): PROTIME, INR in the last 72 hours. APTT: No results for input(s): APTT in the last 72 hours. CARDIAC ENZYMES: No results for input(s): CKMB, CKMBINDEX, TROPONINI in the last 72 hours. Invalid input(s): CKTOTAL;3  FASTING LIPID PANEL:  Lab Results   Component Value Date    CHOL 105 04/09/2015    HDL 43 04/09/2015    TRIG 168 04/09/2015     LIVER PROFILE:   No results for input(s): AST, ALT, ALB, BILIDIR, BILITOT, ALKPHOS in the last 72 hours. MICROBIOLOGY:   Lab Results   Component Value Date/Time    CULTURE KLEBSIELLA PNEUMONIAE >632979 CFU/ML (A) 06/06/2022 05:22 PM    CULTURE  06/06/2022 05:22 PM     PRESUMPTIVE ID: GROUP D ENTEROCOCCUS 10 to 50,000 CFU/ML Susceptibility testing not performed on low colony count organisms. Imaging:    CT HEAD WO CONTRAST    Result Date: 6/25/2022  No acute intracranial abnormality. Senescent changes including cortical atrophy, atherosclerotic disease of the major intracranial vessels and chronic white matter changes. There is no significant change from prior exam.     CT CERVICAL SPINE WO CONTRAST    Result Date: 6/23/2022  No acute abnormality of the cervical spine. No fracture. Changes related to mature instrumented anterior fusion of C5-C6 and C6-C7. At C4-C5, moderate bilateral neural foraminal narrowing. At C5-C6, severe bilateral neural foraminal narrowing. RECOMMENDATIONS: Unavailable     CT THORACIC SPINE WO CONTRAST    Result Date: 6/23/2022  No acute fracture of the thoracic spine.  Dependent opacities in the partially imaged left lung which are likely due to atelectasis, though clinical correlation is required to exclude contributory infection. Trace left pleural effusion. MRA HEAD WO CONTRAST    Result Date: 6/25/2022  Brain MRI: There is no acute infarction, intracranial hemorrhage, or intracranial mass lesion. Mild chronic microangiopathic ischemic disease. Mild generalized volume loss. MRA head: No hemodynamically significant stenosis. No aneurysm. MRA neck: No hemodynamically significant stenosis RECOMMENDATIONS: Unavailable     MRA NECK WO CONTRAST    Result Date: 6/25/2022  Brain MRI: There is no acute infarction, intracranial hemorrhage, or intracranial mass lesion. Mild chronic microangiopathic ischemic disease. Mild generalized volume loss. MRA head: No hemodynamically significant stenosis. No aneurysm. MRA neck: No hemodynamically significant stenosis RECOMMENDATIONS: Unavailable     MRI BRAIN WO CONTRAST    Result Date: 6/25/2022  Brain MRI: There is no acute infarction, intracranial hemorrhage, or intracranial mass lesion. Mild chronic microangiopathic ischemic disease. Mild generalized volume loss. MRA head: No hemodynamically significant stenosis. No aneurysm.  MRA neck: No hemodynamically significant stenosis RECOMMENDATIONS: Unavailable       ASSESSMENT & PLAN       Principal Problem:    Acute metabolic encephalopathy  Active Problems:    Spasm of muscle    Low back pain    Altered mental status    Muscle spasm    Immature arteriovenous fistula (HCC)    History of fusion of cervical spine    Depression with anxiety    Atherosclerosis of coronary artery bypass graft of native heart without angina pectoris    Chronic diastolic heart failure (HCC)    Diabetic polyneuropathy associated with type 2 diabetes mellitus (HCC)    History of coronary artery bypass graft    Iron deficiency anemia    Spinal stenosis of lumbar region with neurogenic claudication    Mixed hyperlipidemia    Type 2 diabetes 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, with long-term current use of insulin ([facility])    Obesity, Class II, BMI 35-39.9    ESRD (end stage renal disease) ([facility] [address]  Resolved Problems:    * No resolved hospital problems. *      1. Acute toxic metabolic encephalopathy:  -Resolved. Alert and oriented x3.  -Was likely because of hypotension associated with dialysis causing confusion, less likely dialysis disequilibrium syndrome.  -Stroke and seizure ruled out. Ammonia within normal limits.  -No SDH, intracranial hemorrhage or infarction or metabolic abnormality like hyponatremia or hypoglycemia found.  -Neurology signed off. Recommend pain management for back pain and muscle spasms. 2. Left UL numbness post AV fistula:   -Vascular surgery was consulted. Unlikely related to vascular steal.  Recommend outpatient follow-up with Dr. Lydia Williamson. 3. Muscle spasms  -worsened after fall. Has longstanding history of muscle spasm, which was resolved after spinal fusion procedure. -CT cervical and thoracic spine showed no fracture, previous fusion at C5-C6 and C6-C7.   -Continue muscle relaxants  -Outpatient pain management. PMNR consulted. Recommends outpatient trigger point injections and TENS. -Started on renally adjusted dose of baclofen 2.5 mg twice daily  4. ESRD (on TTS HD):   -Follows up at [facility] dialysis center under Dr. Severiano Booth.  She is encouraged to be compliant with scheduled dialysis. 5. HFpEF:  -Continue on Lasix 80 mg twice daily. Strict CECELIA's. Cardiac diet. 6. Type II DM:  -On Lantus 50 units twice daily, and medium dose sliding scale insulin. Hypoglycemia protocol in place. 7. Diabetic neuropathy:  8. -Continue gabapentin 300 mg twice daily. 9. Morbid obesity:  -Contributing to chronic morbidity and delayed recovery  10. CAD s/p CABG:  -Continue aspirin, Lipitor 80 mg, and Coreg 3.125 mg twice daily, and Ranexa thousand mg twice daily  11.  Hyperlipidemia:  -Continue Lipitor 80 mg nightly  12. Generalized anxiety disorder  -Buspirone-changed from 10 Mg 3 times daily to 15 mg twice daily  -Continue trazodone/Desyrel 50 mg nightly  -Started on Remeron 15 mg  -Started on duloxetine 30 Mg OD    DVT PPx: On heparin 5000 units 3 times daily  GI PPx[de-identified] On Protonix  Discharge planning:  to assist in discharge planning to SNF. Marlena Herr MD   Internal Medicine Resident, PGY-1  [address], [place]  6/30/2022, 7:17 AM    Attestation and add on       I have discussed the care of Raffi Pfeiffer , including pertinent history and exam findings,      6/30/22    with the resident. I have seen and examined the patient and the key elements of all parts of the encounter have been performed by me . I agree with the assessment, plan and orders as documented by the resident.     ---- ;     MD MOE Gracia[address], [address] [place] [address].    Phone [phone]   Fax: [phone]  Answering Service: [phone]

## 2022-06-30 NOTE — PLAN OF CARE
Problem: Skin/Tissue Integrity  Goal: Absence of new skin breakdown  Description: 1. Monitor for areas of redness and/or skin breakdown  2. Assess vascular access sites hourly  3. Every 4-6 hours minimum:  Change oxygen saturation probe site  4. Every 4-6 hours:  If on nasal continuous positive airway pressure, respiratory therapy assess nares and determine need for appliance change or resting period.   Outcome: Progressing     Problem: Safety - Adult  Goal: Free from fall injury  Outcome: Progressing  Flowsheets (Taken 6/29/2022 8071)  Free From Fall Injury: Instruct family/caregiver on patient safety

## 2022-07-01 ENCOUNTER — TELEPHONE (OUTPATIENT)
Dept: PHYSICAL MEDICINE AND REHAB | Age: 62
End: 2022-07-01

## 2022-07-01 VITALS
TEMPERATURE: 98.2 F | SYSTOLIC BLOOD PRESSURE: 123 MMHG | HEART RATE: 79 BPM | RESPIRATION RATE: 18 BRPM | WEIGHT: 207.23 LBS | DIASTOLIC BLOOD PRESSURE: 50 MMHG | HEIGHT: 65 IN | BODY MASS INDEX: 34.53 KG/M2 | OXYGEN SATURATION: 94 %

## 2022-07-01 PROBLEM — R41.82 ALTERED MENTAL STATUS: Status: RESOLVED | Noted: 2022-06-25 | Resolved: 2022-07-01

## 2022-07-01 PROBLEM — G93.41 ACUTE METABOLIC ENCEPHALOPATHY: Status: RESOLVED | Noted: 2022-06-18 | Resolved: 2022-07-01

## 2022-07-01 LAB
ANION GAP SERPL CALCULATED.3IONS-SCNC: 14 MMOL/L (ref 9–17)
BUN BLDV-MCNC: 29 MG/DL (ref 8–23)
CALCIUM SERPL-MCNC: 9.3 MG/DL (ref 8.6–10.4)
CHLORIDE BLD-SCNC: 96 MMOL/L (ref 98–107)
CO2: 25 MMOL/L (ref 20–31)
CREAT SERPL-MCNC: 3.44 MG/DL (ref 0.5–0.9)
GFR AFRICAN AMERICAN: 16 ML/MIN
GFR NON-AFRICAN AMERICAN: 13 ML/MIN
GFR SERPL CREATININE-BSD FRML MDRD: ABNORMAL ML/MIN/{1.73_M2}
GLUCOSE BLD-MCNC: 301 MG/DL (ref 65–105)
GLUCOSE BLD-MCNC: 349 MG/DL (ref 70–99)
POTASSIUM SERPL-SCNC: 4.2 MMOL/L (ref 3.7–5.3)
SODIUM BLD-SCNC: 135 MMOL/L (ref 135–144)

## 2022-07-01 PROCEDURE — 94761 N-INVAS EAR/PLS OXIMETRY MLT: CPT

## 2022-07-01 PROCEDURE — 6370000000 HC RX 637 (ALT 250 FOR IP)

## 2022-07-01 PROCEDURE — 36415 COLL VENOUS BLD VENIPUNCTURE: CPT

## 2022-07-01 PROCEDURE — 6370000000 HC RX 637 (ALT 250 FOR IP): Performed by: STUDENT IN AN ORGANIZED HEALTH CARE EDUCATION/TRAINING PROGRAM

## 2022-07-01 PROCEDURE — 99232 SBSQ HOSP IP/OBS MODERATE 35: CPT | Performed by: INTERNAL MEDICINE

## 2022-07-01 PROCEDURE — 6360000002 HC RX W HCPCS

## 2022-07-01 PROCEDURE — 82947 ASSAY GLUCOSE BLOOD QUANT: CPT

## 2022-07-01 PROCEDURE — 6370000000 HC RX 637 (ALT 250 FOR IP): Performed by: EMERGENCY MEDICINE

## 2022-07-01 PROCEDURE — 80048 BASIC METABOLIC PNL TOTAL CA: CPT

## 2022-07-01 PROCEDURE — 6370000000 HC RX 637 (ALT 250 FOR IP): Performed by: INTERNAL MEDICINE

## 2022-07-01 RX ORDER — INSULIN LISPRO 100 [IU]/ML
0-18 INJECTION, SOLUTION INTRAVENOUS; SUBCUTANEOUS
Status: DISCONTINUED | OUTPATIENT
Start: 2022-07-01 | End: 2022-07-01 | Stop reason: HOSPADM

## 2022-07-01 RX ORDER — INSULIN LISPRO 100 [IU]/ML
0-9 INJECTION, SOLUTION INTRAVENOUS; SUBCUTANEOUS NIGHTLY
Status: DISCONTINUED | OUTPATIENT
Start: 2022-07-01 | End: 2022-07-01 | Stop reason: HOSPADM

## 2022-07-01 RX ADMIN — INSULIN LISPRO 3 UNITS: 100 INJECTION, SOLUTION INTRAVENOUS; SUBCUTANEOUS at 07:54

## 2022-07-01 RX ADMIN — RANOLAZINE 1000 MG: 500 TABLET, FILM COATED, EXTENDED RELEASE ORAL at 07:50

## 2022-07-01 RX ADMIN — ACETAMINOPHEN 650 MG: 325 TABLET ORAL at 08:43

## 2022-07-01 RX ADMIN — ASPIRIN 81 MG: 81 TABLET, CHEWABLE ORAL at 07:51

## 2022-07-01 RX ADMIN — BACLOFEN 2.5 MG: 5 TABLET ORAL at 07:51

## 2022-07-01 RX ADMIN — SODIUM BICARBONATE 650 MG: 648 TABLET ORAL at 07:51

## 2022-07-01 RX ADMIN — DOCUSATE SODIUM 100 MG: 100 CAPSULE ORAL at 07:55

## 2022-07-01 RX ADMIN — BUSPIRONE HYDROCHLORIDE 15 MG: 10 TABLET ORAL at 07:50

## 2022-07-01 RX ADMIN — GABAPENTIN 100 MG: 100 CAPSULE ORAL at 07:51

## 2022-07-01 RX ADMIN — DULOXETINE HYDROCHLORIDE 30 MG: 30 CAPSULE, DELAYED RELEASE ORAL at 07:51

## 2022-07-01 RX ADMIN — INSULIN LISPRO 12 UNITS: 100 INJECTION, SOLUTION INTRAVENOUS; SUBCUTANEOUS at 07:52

## 2022-07-01 RX ADMIN — CARVEDILOL 3.12 MG: 3.12 TABLET, FILM COATED ORAL at 07:51

## 2022-07-01 RX ADMIN — HEPARIN SODIUM 5000 UNITS: 5000 INJECTION INTRAVENOUS; SUBCUTANEOUS at 07:56

## 2022-07-01 RX ADMIN — TAMSULOSIN HYDROCHLORIDE 0.4 MG: 0.4 CAPSULE ORAL at 07:51

## 2022-07-01 RX ADMIN — FUROSEMIDE 80 MG: 40 TABLET ORAL at 07:51

## 2022-07-01 RX ADMIN — INSULIN GLARGINE 55 UNITS: 100 INJECTION, SOLUTION SUBCUTANEOUS at 07:51

## 2022-07-01 ASSESSMENT — PAIN SCALES - GENERAL
PAINLEVEL_OUTOF10: 0
PAINLEVEL_OUTOF10: 3

## 2022-07-01 ASSESSMENT — PAIN DESCRIPTION - PAIN TYPE: TYPE: ACUTE PAIN

## 2022-07-01 ASSESSMENT — PAIN DESCRIPTION - ONSET: ONSET: ON-GOING

## 2022-07-01 ASSESSMENT — PAIN DESCRIPTION - LOCATION: LOCATION: BACK

## 2022-07-01 ASSESSMENT — PAIN DESCRIPTION - DESCRIPTORS: DESCRIPTORS: SPASM

## 2022-07-01 ASSESSMENT — PAIN DESCRIPTION - ORIENTATION: ORIENTATION: MID

## 2022-07-01 ASSESSMENT — PAIN DESCRIPTION - FREQUENCY: FREQUENCY: INTERMITTENT

## 2022-07-01 NOTE — PLAN OF CARE
Problem: Chronic Conditions and Co-morbidities  Goal: Patient's chronic conditions and co-morbidity symptoms are monitored and maintained or improved  Outcome: Completed     Problem: Skin/Tissue Integrity  Goal: Absence of new skin breakdown  Description: 1. Monitor for areas of redness and/or skin breakdown  2. Assess vascular access sites hourly  3. Every 4-6 hours minimum:  Change oxygen saturation probe site  4. Every 4-6 hours:  If on nasal continuous positive airway pressure, respiratory therapy assess nares and determine need for appliance change or resting period.   Outcome: Completed     Problem: Safety - Adult  Goal: Free from fall injury  Outcome: Completed     Problem: Pain  Goal: Verbalizes/displays adequate comfort level or baseline comfort level  Outcome: Completed     Problem: ABCDS Injury Assessment  Goal: Absence of physical injury  Outcome: Completed     Problem: Nutrition Deficit:  Goal: Optimize nutritional status  Outcome: Completed     Problem: Nutrition Deficit:  Goal: Optimize nutritional status  Outcome: Completed     Problem: Discharge Planning  Goal: Discharge to home or other facility with appropriate resources  Outcome: Completed     Problem: Discharge Planning  Goal: Discharge to home or other facility with appropriate resources  Outcome: Completed

## 2022-07-01 NOTE — PROGRESS NOTES
1. Pt informed that she will be leaving at 11am to [facility] per ambulance. Pt agreeable to be dc. To mine of [place] pt on phone with sister [employer]. Hailey also informed of dc and stated was concerned about pt still having back spasms. Hailey and pt notified that  Nephrology resident talked to medicine team and ok for dc. Informed pt and sister hailey of Dr Raymon Guerrero recommendations and notes Rehab Recommendation: Patient awaiting placement at [facility]. Will trial transition to routine baclofen for spasm - renally dosed. Will schedule patient for outpatient trigger point injections in clinic. Can also consider TENS unit for pain/spasm. Already on renal dose of gabapentin. Other medications (Buspar, duloxetine, Remeron) may limit trial of amitriptyline for nerve pain. Will follow. pt and sister agreeable for dc pt to be dc at 11am to ec attempted to call report to karime of mine will call me back

## 2022-07-01 NOTE — PROGRESS NOTES
Nephrology Progress Note  Subjective:   -No acute events overnight  -Patient is in acute distress this morning secondary to pain  -Patient still experiencing severe back spasms  -Spoke with patient's Sister [facility] who was frustrated stating patient is not being treated appropriately. Explained to her in detail how patient was being treated for back spasms including speaking with primary team.  -Patient states she has not urinated in 2 days  -Electrolytes within normal limits  -Afebrile  -Vital signs stable      Objective:  CURRENT TEMPERATURE:  Temp: 98.2 °F (36.8 °C)  MAXIMUM TEMPERATURE OVER 24HRS:  Temp (24hrs), Av.3 °F (36.8 °C), Min:98.1 °F (36.7 °C), Max:98.7 °F (37.1 °C)    CURRENT RESPIRATORY RATE:  Resp: 18  CURRENT PULSE:  Heart Rate: 79  CURRENT BLOOD PRESSURE:  BP: (!) 123/50  24HR BLOOD PRESSURE RANGE:  Systolic (12VFZ), QEW:071 , Min:103 , VGK:088   ; Diastolic (07IVS), HPD:35, Min:36, Max:79    24HR INTAKE/OUTPUT:      Intake/Output Summary (Last 24 hours) at 2022 0726  Last data filed at 2022 1245  Gross per 24 hour   Intake 260 ml   Output 4000 ml   Net -3740 ml     Patient Vitals for the past 96 hrs (Last 3 readings):   Weight   22 1245 207 lb 3.7 oz (94 kg)   22 0845 220 lb 3.8 oz (99.9 kg)   22 1137 212 lb 11.9 oz (96.5 kg)         Physical Exam:  General appearance: Alert and awake x3. Acute distress due to back spasms. Skin: Warm to touch and no erythema  ENT: Moist mucous membranes  Neck: No JVD or carotid bruit  Pulmonary: Bilateral air entry and clear  Cardiovascular: S1 and S2 audible no S3  Abdomen: soft nontender, bowel sounds present, no ascites  Extremities: Trace edema improving. Patient states this is her baseline    Access:   As previous    Current Medications:    insulin lispro (HUMALOG) injection vial 0-18 Units, TID WC  insulin lispro (HUMALOG) injection vial 0-9 Units, Nightly  insulin lispro (HUMALOG) injection vial 3 Units, TID WC  insulin

## 2022-07-01 NOTE — PROGRESS NOTES
[facility]  Internal Medicine Teaching Residency Program  Inpatient Daily Progress Note  ______________________________________________________________________________    Patient: Celio Martínez  YOB: 1960   [id number]    Acct: [de-identified]     Room: 0250/0250-01  Admit date: 6/23/2022  Today's date: 07/01/22  Number of days in the hospital: 6    SUBJECTIVE   Admitting Diagnosis: Acute metabolic encephalopathy  CC:  Back muscle spasms. Pt seen and examined in hemodialysis  No acute events overnight. Labs and charts reviewed. Afebrile, hemodynamically stable, saturating well on room air  No change in clinical condition. She feels better. Has muscle spasms. Dialysis today as per her TTS schedule. She has hyperglycemia, insulin adjusted. Awaiting placement. ROS:  Constitutional:  negative for chills, fevers, sweats  Respiratory:  negative for cough, dyspnea on exertion, hemoptysis, shortness of breath, wheezing  Cardiovascular:  negative for chest pain, chest pressure/discomfort, lower extremity edema, palpitations  Gastrointestinal:  negative for abdominal pain, constipation, diarrhea, nausea, vomiting  Neurological:  negative for dizziness, headache  BRIEF HISTORY     A 20-year-old female with PMH significant of ESRD (on TTS schedule), diastolic CHF, chronic respiratory failure (on 3 L O2 via NC), CAD, morbid obesity,  - presented from dialysis center with upper back pain and muscle spasms. She reported that muscle spasms have been present chronically but they have worsened after fall 2-3 days prior to presentation. She complained of mid thoracic pain and muscle spasm. CT of the thoracic spine showed no acute fracture of thoracic spine. She was admitted under observation unit. She also complained of numbness and tingling in the left hand for last 1 week, she was noted to have diminished radial pulses.   Vascular surgery was consulted and they recommended outpatient follow-up with Dr. Gogo Lloyd because neck steps regarding fistula function. Next day, she underwent extra session of hemodialysis. Postdialysis, she was found to be confused and oriented only to self. Neurology was consulted for concern of altered mental status and left upper extremity numbness. CT head was done which showed no acute intracranial abnormality, showed senescent changes including cortical atrophy, atherosclerotic disease of major intracranial vessels and chronic white matter changes. Next day, she underwent her scheduled dialysis session and returned to floor. She still had persistent weakness on right side and stroke alert was called. Patient had NIH score 8. Neurology recommended MRI brain and MRA head and neck without contrast, both of which were unremarkable for any acute stroke or extremity changes. EEG was ordered and she was transferred to internal medicine for further evaluation and management. OBJECTIVE     Vital Signs:  BP (!) 123/50   Pulse 79   Temp 98.2 °F (36.8 °C) (Oral)   Resp 18   Ht 5' 5\" (1.651 m)   Wt 207 lb 3.7 oz (94 kg)   SpO2 94%   BMI 34.49 kg/m²     Temp (24hrs), Av.3 °F (36.8 °C), Min:98.1 °F (36.7 °C), Max:98.7 °F (37.1 °C)    In: 260   Out: 4000     Physical Exam:  Constitutional: This is a well developed, well nourished, 35-39.9 - Obesity Grade II 57y.o. year old female who is alert, oriented, cooperative and in no apparent distress. Head:normocephalic and atraumatic. EENT:  PERRLA. No conjunctival injections. Septum was midline, mucosa was without erythema, exudates or cobblestoning. No thrush was noted. Neck: Supple without thyromegaly. No elevated JVP. Trachea was midline. Respiratory: Chest was symmetrical without dullness to percussion. Breath sounds bilaterally were clear to auscultation. There were no wheezes, rhonchi or rales. There is no intercostal retraction or use of accessory muscles. No egophony noted. Cardiovascular: Regular without murmur, clicks, gallops or rubs. Abdomen: Slightly rounded and soft without organomegaly. No rebound, rigidity or guarding was appreciated. Lymphatic: No lymphadenopathy. Musculoskeletal: Normal curvature of the spine. No gross muscle weakness. Extremities:  No lower extremity edema, ulcerations, tenderness, varicosities or erythema. Muscle size, tone and strength are normal.  No involuntary movements are noted. AV fistula  Skin:  Warm and dry. Good color, turgor and pigmentation. No lesions or scars.   No cyanosis or clubbing  Neurological/Psychiatric: The patient's general behavior, level of consciousness, thought content and emotional status is normal.        Medications:  Scheduled Medications:    insulin lispro  0-18 Units SubCUTAneous TID WC    insulin lispro  0-9 Units SubCUTAneous Nightly    insulin lispro  3 Units SubCUTAneous TID WC    insulin glargine  55 Units SubCUTAneous BID    gabapentin  100 mg Oral BID    Baclofen  2.5 mg Oral BID    busPIRone  15 mg Oral BID    mirtazapine  15 mg Oral Nightly    DULoxetine  30 mg Oral Daily    sodium bicarbonate  650 mg Oral TID    atorvastatin  80 mg Oral Nightly    heparin (porcine)  5,000 Units SubCUTAneous BID    aspirin  81 mg Oral Daily    carvedilol  3.125 mg Oral BID    docusate sodium  100 mg Oral BID    furosemide  80 mg Oral BID    pantoprazole  40 mg Oral QAM AC    ranolazine  1,000 mg Oral BID    tamsulosin  0.4 mg Oral Daily    sodium chloride flush  5-40 mL IntraVENous 2 times per day    potassium bicarb-citric acid  20 mEq Oral Daily     Continuous Infusions:    sodium chloride      dextrose       PRN Medicationsheparin (porcine), 1,900 Units, PRN  heparin (porcine), 1,900 Units, PRN  sodium chloride flush, 5-40 mL, PRN  sodium chloride, , PRN  acetaminophen, 650 mg, Q4H PRN  ondansetron, 4 mg, Q8H PRN   Or  ondansetron, 4 mg, Q6H PRN  glucose, 4 tablet, PRN  dextrose bolus, 125 mL, PRN   Or  dextrose bolus, 250 mL, PRN  glucagon (rDNA), 1 mg, PRN  dextrose, 100 mL/hr, PRN        Diagnostic Labs:  CBC:   No results for input(s): WBC, RBC, HGB, HCT, MCV, RDW, PLT in the last 72 hours. BMP:   Recent Labs     06/29/22  0514 06/30/22  0438 07/01/22  0436    135 135   K 4.0 4.1 4.2   CL 97* 95* 96*   CO2 25 26 25   BUN 23 38* 29*   CREATININE 2.90* 3.60* 3.44*     BNP: No results for input(s): BNP in the last 72 hours. PT/INR: No results for input(s): PROTIME, INR in the last 72 hours. APTT: No results for input(s): APTT in the last 72 hours. CARDIAC ENZYMES: No results for input(s): CKMB, CKMBINDEX, TROPONINI in the last 72 hours. Invalid input(s): CKTOTAL;3  FASTING LIPID PANEL:  Lab Results   Component Value Date    CHOL 105 04/09/2015    HDL 43 04/09/2015    TRIG 168 04/09/2015     LIVER PROFILE:   No results for input(s): AST, ALT, ALB, BILIDIR, BILITOT, ALKPHOS in the last 72 hours. MICROBIOLOGY:   Lab Results   Component Value Date/Time    CULTURE KLEBSIELLA PNEUMONIAE >056035 CFU/ML (A) 06/06/2022 05:22 PM    CULTURE  06/06/2022 05:22 PM     PRESUMPTIVE ID: GROUP D ENTEROCOCCUS 10 to 50,000 CFU/ML Susceptibility testing not performed on low colony count organisms. Imaging:    CT HEAD WO CONTRAST    Result Date: 6/25/2022  No acute intracranial abnormality. Senescent changes including cortical atrophy, atherosclerotic disease of the major intracranial vessels and chronic white matter changes. There is no significant change from prior exam.     CT CERVICAL SPINE WO CONTRAST    Result Date: 6/23/2022  No acute abnormality of the cervical spine. No fracture. Changes related to mature instrumented anterior fusion of C5-C6 and C6-C7. At C4-C5, moderate bilateral neural foraminal narrowing. At C5-C6, severe bilateral neural foraminal narrowing.  RECOMMENDATIONS: Unavailable     CT THORACIC SPINE WO CONTRAST    Result Date: 6/23/2022  No acute fracture of the thoracic spine. Dependent opacities in the partially imaged left lung which are likely due to atelectasis, though clinical correlation is required to exclude contributory infection. Trace left pleural effusion. MRA HEAD WO CONTRAST    Result Date: 6/25/2022  Brain MRI: There is no acute infarction, intracranial hemorrhage, or intracranial mass lesion. Mild chronic microangiopathic ischemic disease. Mild generalized volume loss. MRA head: No hemodynamically significant stenosis. No aneurysm. MRA neck: No hemodynamically significant stenosis RECOMMENDATIONS: Unavailable     MRA NECK WO CONTRAST    Result Date: 6/25/2022  Brain MRI: There is no acute infarction, intracranial hemorrhage, or intracranial mass lesion. Mild chronic microangiopathic ischemic disease. Mild generalized volume loss. MRA head: No hemodynamically significant stenosis. No aneurysm. MRA neck: No hemodynamically significant stenosis RECOMMENDATIONS: Unavailable     MRI BRAIN WO CONTRAST    Result Date: 6/25/2022  Brain MRI: There is no acute infarction, intracranial hemorrhage, or intracranial mass lesion. Mild chronic microangiopathic ischemic disease. Mild generalized volume loss. MRA head: No hemodynamically significant stenosis. No aneurysm.  MRA neck: No hemodynamically significant stenosis RECOMMENDATIONS: Unavailable       ASSESSMENT & PLAN       Principal Problem (Resolved):    Acute metabolic encephalopathy  Active Problems:    Spasm of muscle    Low back pain    Muscle spasm    Immature arteriovenous fistula (HCC)    History of fusion of cervical spine    Depression with anxiety    Atherosclerosis of coronary artery bypass graft of native heart without angina pectoris    Chronic diastolic heart failure (HCC)    Diabetic polyneuropathy associated with type 2 diabetes mellitus ([facility] [address]    History of coronary artery bypass graft    Iron deficiency anemia    Spinal stenosis of lumbar region with neurogenic claudication Mixed hyperlipidemia    Type 2 diabetes 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, with long-term current use of insulin ([facility])    Obesity, Class II, BMI 35-39.9    ESRD (end stage renal disease) ([facility] [address]  Resolved Problems:    Altered mental status      1. Acute toxic metabolic encephalopathy:  -Resolved. Alert and oriented x3.  -Was likely because of hypotension associated with dialysis causing confusion, less likely dialysis disequilibrium syndrome.  -Stroke and seizure ruled out. Ammonia within normal limits.  -No SDH, intracranial hemorrhage or infarction or metabolic abnormality like hyponatremia or hypoglycemia found.  -Neurology signed off. Recommend pain management for back pain and muscle spasms. 2. Left UL numbness post AV fistula:   -Vascular surgery was consulted. Unlikely related to vascular steal.  Recommend outpatient follow-up with Dr. Madhuri Medley. 3. Muscle spasms  -worsened after fall. Has longstanding history of muscle spasm, which was resolved after spinal fusion procedure. -CT cervical and thoracic spine showed no fracture, previous fusion at C5-C6 and C6-C7.   -Continue muscle relaxants  -Outpatient pain management. PMNR consulted. Recommends outpatient trigger point injections and TENS. -Started on renally adjusted dose of baclofen 2.5 mg twice daily  4. ESRD (on TTS HD):   -Follows up at [facility] dialysis center under Dr. Vikas Ni.  She is encouraged to be compliant with scheduled dialysis. 5. HFpEF:  -Continue on Lasix 80 mg twice daily. Strict CECELIA's. Cardiac diet. 6. Type II DM:  -On Lantus 50 units twice daily, and medium dose sliding scale insulin. Hypoglycemia protocol in place. 7. Diabetic neuropathy:  8. -Continue gabapentin 300 mg twice daily. 9. Morbid obesity:  -Contributing to chronic morbidity and delayed recovery  10.  CAD s/p CABG:  -Continue aspirin, Lipitor 80 mg, and Coreg 3.125 mg twice daily, and Ranexa thousand mg twice daily  11. Hyperlipidemia:  -Continue Lipitor 80 mg nightly  12. Generalized anxiety disorder  -Buspirone-changed from 10 Mg 3 times daily to 15 mg twice daily  -Continue trazodone/Desyrel 50 mg nightly  -Started on Remeron 15 mg  -Started on duloxetine 30 Mg OD    DVT PPx: On heparin 5000 units 3 times daily  GI PPx[de-identified] On Protonix  Discharge planning: Waiting on placement    Rosie Mcguire MD.  Internal Medicine Resident PGY [facility]   7/1/2022, 9:08 AM    Attestation and add on       I have discussed the care of Sunny Rivera , including pertinent history and exam findings,      7/1/22    with the resident. I have seen and examined the patient and the key elements of all parts of the encounter have been performed by me . I agree with the assessment, plan and orders as documented by the resident.      Hospital Problems           Last Modified POA    Spasm of muscle 6/26/2022 Yes    Low back pain 6/23/2022 Yes    Muscle spasm 6/25/2022 Yes    Immature arteriovenous fistula ([address] 6/26/2022 Yes    History of fusion of cervical spine 6/27/2022 Yes    Depression with anxiety 6/27/2022 Yes    Atherosclerosis of coronary artery bypass graft of native heart without angina pectoris 6/26/2022 Yes    Chronic diastolic heart failure ([address] 6/26/2022 Yes    Diabetic polyneuropathy associated with type 2 diabetes mellitus ([address] 6/26/2022 Yes    History of coronary artery bypass graft 6/26/2022 Yes    Iron deficiency anemia 6/26/2022 Yes    Spinal stenosis of lumbar region with neurogenic claudication 6/26/2022 Yes    Mixed hyperlipidemia 6/26/2022 Yes    Type 2 diabetes 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, with long-term current use of insulin ([address] 6/26/2022 Yes    Obesity, Class II, BMI 35-39.9 6/26/2022 Yes    ESRD (end stage renal disease) ([address] 6/25/2022 Yes             ''''''''''       MD MOE Bautista [address] [id number].   Phone [phone]   Fax: [phone]  Answering Service: [phone]

## 2022-07-01 NOTE — ADT AUTH CERT
Utilization Reviews         Musculoskeletal Disease GRG - Care Day 8 (6/30/2022) by Kemi Hunter RN       Review Status Review Entered   Completed 7/1/2022 16:04      Criteria Review      Care Day: 8 Care Date: 6/30/2022 Level of Care: Inpatient Floor    Guideline Day 2    Level Of Care    (X) Floor    7/1/2022 4:04 PM EDT by Ke Flores    Clinical Status    (X) * No ICU or intermediate care needs    7/1/2022 4:04 PM EDT by Luane Dial      06/30/22 0751 98.2 (36.8) 18 77 115/62 92% 2L    Interventions    (X) Inpatient interventions continue    7/1/2022 4:04 PM EDT by Luane Dial      SEE NARRATIVE    * Milestone   Additional Notes   DATE: 6/30/22         Pertinent Updates:      on 3 L O2 NC   She feels better.  Has muscle spasms. Dialysis today as per her TTS schedule. She has hyperglycemia, insulin adjusted. Abnl/Pertinent Labs/Radiology/Diagnostic Studies:         6/30/2022 04:38   Sodium: 135   Potassium: 4.1   Chloride: 95 (L)   CO2: 26   BUN,BUNPL: 38 (H)   Creatinine: 3.60 (H)   Anion Gap: 14   GFR Non-: 13 (L)   GFR : 15 (L)   GLUCOSE, FASTING,GF: 311 (H)   CALCIUM, SERUM, [id number]: 8.9               Physical Exam:      Respiratory: Chest was symmetrical without dullness to percussion.  Breath sounds bilaterally were clear to auscultation. There were no wheezes, rhonchi or rales. There is no intercostal retraction or use of accessory muscles. No egophony noted.     Cardiovascular: Regular without murmur, clicks, gallops or rubs         MD Consults/Assessments & Plans:      Acute toxic metabolic encephalopathy:    Alert and oriented x3.   -Was likely because of hypotension associated with dialysis causing confusion, less likely dialysis disequilibrium syndrome.   -Stroke and seizure ruled out.  Ammonia within normal limits.   -No SDH, intracranial hemorrhage or infarction or metabolic abnormality like hyponatremia or hypoglycemia found.   -Neurology signed off.  Recommend pain management for back pain and muscle spasms. Left UL numbness post AV fistula:    -Vascular surgery was consulted. Ellen Dakins related to vascular steal.  Recommend outpatient follow-up with Dr. Amy Mallory. Muscle spasms   -worsened after fall.  Has longstanding history of muscle spasm, which was resolved after spinal fusion procedure. -CT cervical and thoracic spine showed no fracture, previous fusion at C5-C6 and C6-C7.    -Continue muscle relaxants   -Outpatient pain management.  PMNR consulted.  Recommends outpatient trigger point injections and TENS. -Started on renally adjusted dose of baclofen 2.5 mg twice daily   ESRD (on TTS HD):    -Follows up at [facility] dialysis center under Dr. Mikey Kohler. Joann Shen is encouraged to be compliant with scheduled dialysis. HFpEF:   -Continue on Lasix 80 mg twice daily.  Strict CECELIA's.  Cardiac diet. Type II DM:   -On Lantus 50 units twice daily, and medium dose sliding scale insulin.  Hypoglycemia protocol in place.    Diabetic neuropathy:   -Continue gabapentin 300 mg twice daily.     Morbid obesity:   -Contributing to chronic morbidity and delayed recovery   CAD s/p CABG:   -Continue aspirin, Lipitor 80 mg, and Coreg 3.125 mg twice daily, and Ranexa thousand mg twice daily   Hyperlipidemia:   -Continue Lipitor 80 mg nightly   Generalized anxiety disorder   -Buspirone-changed from 10 Mg 3 times daily to 15 mg twice daily   -Continue trazodone/Desyrel 50 mg nightly   -Started on Remeron 15 mg   -Started on duloxetine 30 Mg OD       DVT PPx: On heparin 5000 units 3 times daily   GI PPx[de-identified] On Protonix         Medications:      · lidocaine 1 patch TransDERmal Daily   · Baclofen 2.5 mg Oral BID   · busPIRone 15 mg Oral BID   · mirtazapine 15 mg Oral Nightly   · DULoxetine 30 mg Oral Daily   · insulin lispro 0-12 Units SubCUTAneous TID AC   · gabapentin 300 mg Oral BID   · sodium bicarbonate 650 mg Oral TID   · atorvastatin 80 mg Oral Nightly   · heparin (porcine) 5,000 Units SubCUTAneous BID   · aspirin 81 mg Oral Daily   · carvedilol 3.125 mg Oral BID   · docusate sodium 100 mg Oral BID   · furosemide 80 mg Oral BID   · insulin glargine 50 Units SubCUTAneous BID   · pantoprazole 40 mg Oral QAM AC   · ranolazine 1,000 mg Oral BID   · tamsulosin 0.4 mg Oral Daily   · potassium bicarb-citric acid 20 mEq Oral Daily               Orders:      UNIT VS   CONT PULSE OX   NEURO CHECKS Q 4 HR   DAILY WT   I&O   DAILY LABS   RENAL DIET       OT eval and treat       PT eval and treat       POC Glucose Fingerstick            PT/OT/SLP/CM Assessments or Notes:      DC PLAN TO SNF            Musculoskeletal Disease GRG - Care Day 5 (6/27/2022) by Chavez Weston RN       Review Status Review Entered   Completed 7/1/2022 15:57      Criteria Review      Care Day: 5 Care Date: 6/27/2022 Level of Care: Inpatient Floor    Guideline Day 1    Level Of Care    (X) ICU or intermediate care    7/1/2022 3:57 PM EDT by Annamaria Gowers FLOOR    Clinical Status    (X) * Clinical Indications met    7/1/2022 3:57 PM EDT by Miguel Queen      6/27/22  0840 97.3  72  17  114/35  90% RA  2047  98.2  80  17  144/49  95% RA    Interventions    (X) Inpatient interventions as needed    7/1/2022 3:57 PM EDT by Miguel Queen      SEE NARRATIVE    * Milestone   Additional Notes   DATE: 6/27/22      Pertinent Updates:      Patient was seen and examined, back pain and muscle spasm present. A stroke alert was called on her yesterday for change in mental status and not moving left side, EEG and MRI was done.  Unremarkable.    She received dialysis yesterday via CVC, 1.7 L removed, left aVF maturing            Abnl/Pertinent Labs/Radiology/Diagnostic Studies:         6/27/2022 06:22   POC Glucose: 143 (H)      6/27/2022 07:25   Sodium: 138   Potassium: 4.5   Chloride: 100   CO2: 24   BUN,BUNPL: 25 (H)   Creatinine: 3.70 (H)   Anion Gap: 14   GFR Non- American: 12 (L)   GFR : 15 (L)   GLUCOSE, FASTING,GF: 144 (H)   CALCIUM, SERUM, [id number]: 8.7      WBC: 4.4   RBC: 3.40 (L)   Hemoglobin Quant: 10.8 (L)   Hematocrit: 32.9 (L)   Platelet, Fluorescence: Platelet clumps present, count appears adequate. 6/27/2022 11:07   POC Glucose: 281 (H)      6/27/2022 13:07   EKG 12-LEAD: Attch   Atrial Rate: 83   P Axis: 84   P-R Interval: 160   Q-T Interval: 404   QRS Duration: 98   QTc Calculation (Bazett): 474   R Axis: 58   T Axis: 123   Ventricular Rate: 83      6/27/2022 16:11   POC Glucose: 339 (H)      6/27/2022 20:50   POC Glucose: 301 (H)            Physical Exam:      Respiratory: Chest was symmetrical without dullness to percussion.  Breath sounds bilaterally were clear to auscultation. There were no wheezes, rhonchi or rales. There is no intercostal retraction or use of accessory muscles. No egophony noted. Cardiovascular: Regular without murmur, clicks, gallops or rubs. Abdomen: Slightly rounded and soft without organomegaly. No rebound, rigidity or guarding was appreciated.     Lymphatic: No lymphadenopathy. Musculoskeletal: Normal curvature of the spine.  No gross muscle weakness.     Extremities:  No lower extremity edema, ulcerations, tenderness, varicosities or erythema.  Muscle size, tone and strength are normal.  No involuntary movements are noted.  AV fistula         MD Consults/Assessments & Plans:      Acute toxic metabolic encephalopathy:   -Mentation improving, back to baseline.  Alert and oriented x3.   -Was likely because of hypotension associated with dialysis causing confusion vs dialysis disequilibrium syndrome.   -Stroke and seizure ruled out.  Ammonia within normal limits.   -No SDH, intracranial hemorrhage or infarction or metabolic abnormality like hyponatremia or hypoglycemia found.   -Neurology signed off.  Recommend pain management for back pain and muscle spasms.    Left UL numbness post AV fistula:    -Vascular surgery was consulted. Sarah Sullivan related to vascular steal.  Recommend outpatient follow-up with Dr. Jett Soto. Muscle spasms   -worsened after fall.  Has longstanding history of muscle spasm, which was resolved after spinal fusion procedure.  Had history of spondylolisthesis?  As per the patient. -CT cervical and thoracic spine showed no fracture, previous fusion at C5-C6 and C6-C7.    -Continue muscle relaxant, Flexeril 5 mg 3 times daily.    -Outpatient pain management. ESRD (on TTS HD):    -Follows up at [facility] dialysis center under Dr. Anson Garsia. Marylen Harden is encouraged to be compliant with scheduled dialysis. HFpEF:   -Continue on Lasix 80 mg twice daily.  Strict CECELIA's.  Cardiac diet. Type II DM:   -On Lantus 50 units twice daily, and medium dose sliding scale insulin.  Hypoglycemia protocol in place.    Diabetic neuropathy:   -Was on gabapentin 300 mg twice daily.  Neuro recommended to hold given altered mental status   Morbid obesity:   -Contributing to chronic morbidity and delayed recovery   CAD s/p CABG:   -Continue aspirin, Lipitor 80 mg, and Coreg 3.125 mg twice daily, and Ranexa thousand mg twice daily   Hyperlipidemia:   -Continue Lipitor 80 mg nightly   Generalized anxiety disorder   -Continue buspirone 10 mg 3 times daily   -Continue trazodone/Desyrel 50 mg nightly   -We will start on Vistaril       DVT PPx: On heparin 5000 units 3 times daily   GI PPx[de-identified] On Protonix         Medications:      · busPIRone 15 mg Oral BID   · mirtazapine 15 mg Oral Nightly   · DULoxetine 30 mg Oral Daily   · Baclofen 2.5 mg Oral BID   · insulin lispro 0-12 Units SubCUTAneous TID AC   · [Held by provider] gabapentin 300 mg Oral BID   · morphine 1 mg IntraVENous Once   · sodium bicarbonate 650 mg Oral TID   · atorvastatin 80 mg Oral Nightly   · heparin (porcine) 5,000 Units SubCUTAneous BID   · aspirin 81 mg Oral Daily   · carvedilol 3.125 mg Oral BID   · docusate sodium 100 mg Oral BID   · furosemide 80 mg Oral BID   · insulin glargine 50 Units SubCUTAneous BID   · pantoprazole 40 mg Oral QAM AC   · ranolazine 1,000 mg Oral BID   · tamsulosin 0.4 mg Oral Daily   · sodium chloride flush 5-40 mL IntraVENous 2 times per day   · potassium bicarb-citric acid 20 mEq Oral Daily      PRN   FLEXERIL 5 MG PO X 1         Orders:   UNIT VS   CONT PULSE OX   NEURO CHECKS Q 4 HR   DAILY WT   I&O   DAILY LABS   RENAL DIET       OT eval and treat       PT eval and treat       POC Glucose Fingerstick            PT/OT/SLP/CM Assessments or Notes:   DC PLAN TO SNF

## 2022-07-01 NOTE — CARE COORDINATION
Transitional planning:  Received call from Consuelo Phillips, they received precert authorization today for [facility] at [facility] Nguyen's call back number is [phone].     2145 Transportation forms, medical necessity, and demographic form faxed to [facility] flight network. Zachary Bateman states that she just had a long conversation with the internal medicine doctor,     who's with nephrology, or nephrologist. \"I just had a very long conversation with the resident, states she has severe back spasms after every dialysis session. \"   She has tremors and back spasms, \"I believe it's related to her dialysis. \"   Dr Hernandez Sit approaches [facility] and states that he is going to speak with internal medicine doctor. Keira Ramey, Unit manager here and explains pt is going to see Dr. Etta Baumgarten outpt in her office for procedure on the nerve in her back. Called Hailey back and states she spoke with Franklyn Casillas, unit RN and she approves transport. [address] called and attained nurse report number, which is [phone]. Consuelo Phillips was also notified that pt will be getting hemodialysis T/TH/Sat with chair times at noon. Notified Annie Bates is complete and she will get from Epic system. Consuelo Phillips wanted to know about placing pt in semi-private room. [facility] and spoke with Denise June who states can transport at 11 am today. [address] back and notified of transport time at 11:00 am. [facility], Dr. Marcelino Szymanski number to f/u for back pain. Dr. Marcelino Szymanski number is [phone]. Included in transportation packet, H & P, CT cervical and thoracic spine results. And MRA of head. [address] completed. Makenna Maradiaga, unit RN transportation packet with nurse report number on it.

## 2022-07-01 NOTE — TELEPHONE ENCOUNTER
Hailey called to get her sister a rehab follow up appointment scheduled. Hailey states that she need to call to get Merlene Johnson in as soon as possible. Next available is not until Aug.     Merlene Johnson is being discharged to a rehab facility today.

## 2022-07-02 ENCOUNTER — HOSPITAL ENCOUNTER (OUTPATIENT)
Age: 62
Setting detail: SPECIMEN
Discharge: HOME | End: 2022-07-02

## 2022-07-02 LAB
-: ABNORMAL
BACTERIA: ABNORMAL
BILIRUBIN URINE: ABNORMAL
COLOR: ABNORMAL
EPITHELIAL CELLS UA: ABNORMAL /HPF (ref 0–5)
GLUCOSE URINE: NEGATIVE
KETONES, URINE: ABNORMAL
LEUKOCYTE ESTERASE, URINE: ABNORMAL
NITRITE, URINE: NEGATIVE
PH UA: 5 (ref 5–8)
PROTEIN UA: ABNORMAL
RBC UA: ABNORMAL /HPF (ref 0–2)
SPECIFIC GRAVITY UA: 1.03 (ref 1–1.03)
TURBIDITY: CLEAR
URINE HGB: NEGATIVE
UROBILINOGEN, URINE: NORMAL
WBC UA: ABNORMAL /HPF (ref 0–5)
YEAST: ABNORMAL

## 2022-07-02 PROCEDURE — 87086 URINE CULTURE/COLONY COUNT: CPT

## 2022-07-02 PROCEDURE — 81001 URINALYSIS AUTO W/SCOPE: CPT

## 2022-07-02 PROCEDURE — 87186 SC STD MICRODIL/AGAR DIL: CPT

## 2022-07-02 PROCEDURE — 87077 CULTURE AEROBIC IDENTIFY: CPT

## 2022-07-02 NOTE — DISCHARGE SUMMARY
[address]     Department of Internal Medicine - Staff Internal Medicine Teaching Service    INPATIENT DISCHARGE SUMMARY      Patient Identification:  Andrew Durbin is a 57 y.o. female. :  1960  MRN: [id number]     Acct: [de-identified]   PCP: AFSANEH Pollard CNP  Admit Date:  2022  Discharge date and time: 2022 10:54 AM   Attending Provider: No att. providers found                                     [address] Problem Lists:  Principal Problem (Resolved):    Acute metabolic encephalopathy  Active Problems:    Spasm of muscle    Low back pain    Muscle spasm    Immature arteriovenous fistula ([address]    History of fusion of cervical spine    Depression with anxiety    Atherosclerosis of coronary artery bypass graft of native heart without angina pectoris    Chronic diastolic heart failure ([address]    Diabetic polyneuropathy associated with type 2 diabetes mellitus ([address]    History of coronary artery bypass graft    Iron deficiency anemia    Spinal stenosis of lumbar region with neurogenic claudication    Mixed hyperlipidemia    Type 2 diabetes 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, with long-term current use of insulin ([facility])    Obesity, Class II, BMI 35-39.9    ESRD (end stage renal disease) ([address]  Resolved Problems:    Altered mental status      HOSPITAL STAY     Brief Inpatient course:   Andrew Durbin is a 57 y.o. female who was admitted for the management of Acute metabolic encephalopathy, presented to the emergency department during dialysis with upper back pain and muscle spasms. She reported that back muscle spasms have worsened after a fall 2-3 days prior to presentation. CT of the thoracic spine was done which showed no acute fracture of thoracic spine. She was admitted in observation unit. Vascular surgery was consulted as she complained of numbness and tingling in the left hand for past 1 week.   There was concern of vascular steal phenomenon. Vascular surgery recommended outpatient follow-up with Dr. Isela Taylor. Next day, she underwent extra hemodialysis to cover up for partial dialysis prior to presentation. Postdialysis, she was found to be confused and oriented only to self. Neurology was consulted for the concern of altered mental status left upper extremity numbness and weakness. CT head was done which showed no acute intracranial abnormality, showed senescent changes including cortical atrophy, atherosclerotic disease of major intracranial vessels and chronic white matter changes. Next day, she underwent her regular scheduled hemodialysis and return to floor. She had persistent weakness and worsened mentation. NIH score was 8 stroke alert was called. Neurology recommended MRI brain and MRA head and neck without contrast, both of which were unremarkable for any acute stroke or extremity changes. EEG was ordered, unremarkable for any epileptic activity. PMNR was consulted for persistent back pain and muscle spasms. They recommended admission to SNF and follow-up outpatient for trigger point injections and TENS. Following were the significant interventions during this hospital admission:    1. Acute toxic metabolic encephalopathy:  -Resolved. Was likely because of hypotension associated with dialysis causing confusion, less likely dialysis disequilibrium syndrome.  -Stroke and seizure ruled out. No SDH, intracranial hemorrhage or infarction or metabolic abnormality like hyponatremia or hypoglycemia found. Neurology signed off.   2. Left UL numbness post AV fistula:   -Vascular surgery was consulted. Unlikely related to vascular steal.  Recommend outpatient follow-up with Dr. Odalys Barahona. 3. Muscle spasms  -worsened after fall. Has longstanding history of muscle spasm, which was resolved after spinal fusion procedure.    -CT cervical and thoracic spine showed no fracture, previous fusion at C5-C6 and C6-C7. Continue muscle relaxants  -Outpatient pain management. PMNR consulted. Recommends outpatient trigger point injections and TENS. -Started on renally adjusted dose of baclofen 2.5 mg twice daily. 4. ESRD (on TTS HD):   -Follows up at [facility] dialysis center under Dr. Vikas Ni.  She is encouraged to be compliant with scheduled dialysis. 5. HFpEF:  -Continue on Lasix 80 mg twice daily. Cardiac diet. 6. Type II DM:  -On Lantus 50 units twice daily, and medium dose sliding scale insulin. Hypoglycemia protocol in place. 7. Diabetic neuropathy:  8. -Continue gabapentin 300 mg twice daily. 9. Morbid obesity:  -Contributing to chronic morbidity and delayed recovery  10. CAD s/p CABG:  -Continue aspirin, Lipitor 80 mg, and Coreg 3.125 mg twice daily, and Ranexa thousand mg twice daily  11. Hyperlipidemia:  -Continue Lipitor 80 mg nightly  12. Generalized anxiety disorder  -Buspirone-changed from 10 Mg 3 times daily to 15 mg twice daily  -Continue trazodone/Desyrel 50 mg nightly  -Started on Remeron 15 mg  -Started on duloxetine 30 Mg OD    Procedures:    Hemodialysis as per her TTS schedule.      Consults:     Consults:     Final Specialist Recommendations/Findings:   IP CONSULT TO VASCULAR SURGERY  IP CONSULT TO NEPHROLOGY  IP CONSULT TO NEUROLOGY  IP CONSULT TO CASE MANAGEMENT  IP CONSULT TO PHYSICAL MEDICINE REHAB      Any Hospital Acquired Infections: none    Discharge Functional Status:  stable    DISCHARGE PLAN     Disposition: [facility]    Patient Instructions:   Discharge Medication List as of 7/1/2022  8:44 AM      START taking these medications    Details   Baclofen (LIORESAL) 5 MG tablet Take 0.5 tablets by mouth 2 times daily, Disp-30 tablet, R-0Normal      DULoxetine (CYMBALTA) 30 MG extended release capsule Take 1 capsule by mouth daily, Disp-30 capsule, R-3Normal      mirtazapine (REMERON) 15 MG tablet Take 1 tablet by mouth nightly, Disp-30 tablet, R-3Normal         CONTINUE these medications which have CHANGED    Details   atorvastatin (LIPITOR) 80 MG tablet Take 1 tablet by mouth nightly, Disp-30 tablet, R-3Normal      busPIRone (BUSPAR) 15 MG tablet Take 15 mg by mouth 2 times daily, Disp-60 tablet, R-0Normal         CONTINUE these medications which have NOT CHANGED    Details   gabapentin (NEURONTIN) 300 MG capsule Take 300 mg by mouth 2 times daily. Historical Med      tiZANidine (ZANAFLEX) 4 MG tablet Take 4 mg by mouth every 6 hours as neededHistorical Med      carvedilol (COREG) 3.125 MG tablet Take 1 tablet by mouth 2 times daily, Disp-60 tablet, R-11Normal      insulin glargine (BASAGLAR KWIKPEN) 100 UNIT/ML injection pen Inject 50 Units into the skin 2 times daily, Disp-5 pen, R-11Normal      miconazole (MICOTIN) 2 % powder Apply topically 2 times daily. , Disp-45 g, R-1, Normal      melatonin 10 MG CAPS capsule Take 10 mg by mouth nightlyHistorical Med      lidocaine-prilocaine (EMLA) 2.5-2.5 % cream Apply topically as needed for Pain Indications: Apply to dialysis site Apply topically as needed. , Topical, PRN, Historical Med      traZODone (DESYREL) 50 MG tablet TAKE 1 & 1/2 (ONE & ONE-HALF) TABLETS BY MOUTH NIGHTLY, Disp-45 tablet, R-11Normal      furosemide (LASIX) 80 MG tablet Take 1 tablet by mouth 2 times daily, Disp-60 tablet, R-0Normal      sodium bicarbonate 650 MG tablet Take 1 tablet by mouth 3 times daily, Disp-90 tablet, R-0Normal      Insulin Pen Needle (EASY TOUCH PEN NEEDLES) 29G X 12MM MISC DAILY Starting Tue 3/1/2022, Disp-100 each, R-5, Normal      !! ReliOn Lancets Micro-Thin 33G MISC USE AS DIRECTED EVERY DAYHistorical Med      HUMALOG 100 UNIT/ML injection vial Inject into the skin 3 times daily (before meals) Indications: 15 units and sliding scale (patient reports only the sliding scale) , DAWHistorical Med      Blood Glucose Monitoring Suppl (TRUE METRIX GO GLUCOSE METER) w/Device KIT 1 each by Does not apply route 4 times daily, Disp-1 kit, R-1Normal      !!  Lancets MISC DAILY Starting Tue 11/30/2021, Disp-100 each, R-11, Normal      febuxostat (ULORIC) 40 MG TABS tablet Take 1 tablet by mouth daily, Disp-30 tablet, R-0Normal      docusate sodium (COLACE) 100 MG capsule Take 1 capsule by mouth 2 times daily, Disp-60 capsule, R-0Normal      tamsulosin (FLOMAX) 0.4 MG capsule Take 1 capsule by mouth daily, Disp-30 capsule, R-3Normal      aspirin 81 MG chewable tablet Take 1 tablet by mouth daily, Disp-30 tablet, R-0Normal      ranolazine (RANEXA) 1000 MG extended release tablet Take 1 tablet by mouth 2 times daily, Disp-60 tablet, R-0Normal      pantoprazole (PROTONIX) 40 MG tablet Take 1 tablet by mouth every morning (before breakfast), Disp-30 tablet, R-0Normal       !! - Potential duplicate medications found. Please discuss with provider. STOP taking these medications       acetaminophen (TYLENOL) 325 MG tablet Comments:   Reason for Stopping:               Activity: activity as tolerated    Diet: diabetic diet and renal diet    Follow-up:    Conner Ruth MD  [address] [facility]  [phone]    Schedule an appointment as soon as possible for a visit in 2 weeks  For Trigger Point Injections and TENS unit    AFSANEH Mathew CNP  [address]  [address] [address]  [phone]    In 1 week      [facility], MD  [address] [address]  [phone]    In 1 week  esrd    Marilynn Gonzalez MD  [facility] [address] [facility]  [phone]    In 1 week  Anxiety and depression management      Patient Instructions: Please follow up with your PCP in a week for optimization of your medications. Follow-up with physical medicine and rehab for the further management of back pain and muscle spasms. Please continue to take all your medications as prescribed. Continue to go for your hemodialysis sessions every time as scheduled. Come back to ER or seek medical attention soon if your symptoms worsens.   Follow up labs: none   Follow up imaging: none     Note that over 30 minutes was spent in preparing discharge papers, discussing discharge with patient, medication review, etc.      Franklin Pinzon MD,  Internal Medicine Resident, PGY-1  [facility];  [place], [place]  7/2/2022, 9:07 AM

## 2022-07-04 LAB
CULTURE: ABNORMAL
Lab: ABNORMAL
SPECIMEN DESCRIPTION: ABNORMAL

## 2022-07-05 ENCOUNTER — HOSPITAL ENCOUNTER (OUTPATIENT)
Age: 62
Setting detail: SPECIMEN
Discharge: HOME | End: 2022-07-05

## 2022-07-05 LAB
ANION GAP SERPL CALCULATED.3IONS-SCNC: 21 MMOL/L (ref 9–17)
BUN BLDV-MCNC: 42 MG/DL (ref 8–23)
BUN/CREAT BLD: 9 (ref 9–20)
CALCIUM SERPL-MCNC: 9.1 MG/DL (ref 8.6–10.4)
CHLORIDE BLD-SCNC: 91 MMOL/L (ref 98–107)
CO2: 23 MMOL/L (ref 20–31)
CREAT SERPL-MCNC: 4.45 MG/DL (ref 0.5–0.9)
GFR AFRICAN AMERICAN: 12 ML/MIN
GFR NON-AFRICAN AMERICAN: 10 ML/MIN
GFR SERPL CREATININE-BSD FRML MDRD: ABNORMAL ML/MIN/{1.73_M2}
GLUCOSE BLD-MCNC: 105 MG/DL (ref 70–99)
HCT VFR BLD CALC: 37.1 % (ref 36.3–47.1)
HEMOGLOBIN: 11.4 G/DL (ref 11.9–15.1)
MCH RBC QN AUTO: 31.2 PG (ref 25.2–33.5)
MCHC RBC AUTO-ENTMCNC: 30.7 G/DL (ref 28.4–34.8)
MCV RBC AUTO: 101.6 FL (ref 82.6–102.9)
NRBC AUTOMATED: 0 PER 100 WBC
PDW BLD-RTO: 18.1 % (ref 11.8–14.4)
PLATELET # BLD: 198 K/UL (ref 138–453)
PMV BLD AUTO: 10.9 FL (ref 8.1–13.5)
POTASSIUM SERPL-SCNC: 4.2 MMOL/L (ref 3.7–5.3)
RBC # BLD: 3.65 M/UL (ref 3.95–5.11)
SODIUM BLD-SCNC: 135 MMOL/L (ref 135–144)
WBC # BLD: 5.4 K/UL (ref 3.5–11.3)

## 2022-07-05 PROCEDURE — 36415 COLL VENOUS BLD VENIPUNCTURE: CPT

## 2022-07-05 PROCEDURE — 85027 COMPLETE CBC AUTOMATED: CPT

## 2022-07-05 PROCEDURE — 80048 BASIC METABOLIC PNL TOTAL CA: CPT

## 2022-07-05 PROCEDURE — P9603 ONE-WAY ALLOW PRORATED MILES: HCPCS

## 2022-07-06 ENCOUNTER — OFFICE VISIT (OUTPATIENT)
Dept: PHYSICAL MEDICINE AND REHAB | Age: 62
End: 2022-07-06
Payer: COMMERCIAL

## 2022-07-06 VITALS
SYSTOLIC BLOOD PRESSURE: 124 MMHG | BODY MASS INDEX: 34.49 KG/M2 | TEMPERATURE: 97.5 F | HEIGHT: 65 IN | DIASTOLIC BLOOD PRESSURE: 78 MMHG

## 2022-07-06 DIAGNOSIS — M62.838 SPASM OF MUSCLE: ICD-10-CM

## 2022-07-06 DIAGNOSIS — M79.10 MYALGIA: Primary | ICD-10-CM

## 2022-07-06 PROCEDURE — 1111F DSCHRG MED/CURRENT MED MERGE: CPT | Performed by: PHYSICAL MEDICINE & REHABILITATION

## 2022-07-06 PROCEDURE — G8427 DOCREV CUR MEDS BY ELIG CLIN: HCPCS | Performed by: PHYSICAL MEDICINE & REHABILITATION

## 2022-07-06 PROCEDURE — 3017F COLORECTAL CA SCREEN DOC REV: CPT | Performed by: PHYSICAL MEDICINE & REHABILITATION

## 2022-07-06 PROCEDURE — 20553 NJX 1/MLT TRIGGER POINTS 3/>: CPT | Performed by: PHYSICAL MEDICINE & REHABILITATION

## 2022-07-06 PROCEDURE — G8417 CALC BMI ABV UP PARAM F/U: HCPCS | Performed by: PHYSICAL MEDICINE & REHABILITATION

## 2022-07-06 PROCEDURE — 1036F TOBACCO NON-USER: CPT | Performed by: PHYSICAL MEDICINE & REHABILITATION

## 2022-07-06 PROCEDURE — 99213 OFFICE O/P EST LOW 20 MIN: CPT | Performed by: PHYSICAL MEDICINE & REHABILITATION

## 2022-07-06 RX ORDER — LIDOCAINE HYDROCHLORIDE 10 MG/ML
5 INJECTION, SOLUTION INFILTRATION; PERINEURAL ONCE
Status: COMPLETED | OUTPATIENT
Start: 2022-07-06 | End: 2022-07-06

## 2022-07-06 RX ADMIN — LIDOCAINE HYDROCHLORIDE 5 ML: 10 INJECTION, SOLUTION INFILTRATION; PERINEURAL at 15:39

## 2022-07-06 NOTE — PROGRESS NOTES
[facility] PHYSICAL MEDICINE & REHABILITATION  [address] [place] [address]  [place] [address]  Dept: [phone]  Dept Fax: [phone]    Outpatient Followup Note    Aleksandar Hoang, 57 y.o., female, presents for follow up c/o of Back Pain (hospital follow-up from [facility]. Leo)  . HPI:     HPI  Patient seen at [facility]. Frediss for recurrent muscle spasm causing her severe pain. Her ESRD limits dosing and medications to manage her symptoms. She had minimal response to Flexeril at [facility]. Frediss and it was discontinued. No improvement on routine renal dose of baclofen 2.5 mg BID. Some mild improvement with Tizanidine prn but this causes drowsiness. She was discharged to a SNF last week and is currently treating there. She is here today for evaluation and treatment with trigger point injections.      Past Medical History:   Diagnosis Date    Arthritis     Backache, unspecified     Cerebral artery occlusion with cerebral infarction ([address]     CHF (congestive heart failure) (HCC)     Chronic kidney disease     Coronary atherosclerosis of artery bypass graft     COVID 1/31/2022    Cramp of limb     Gallstones     Hemodialysis patient ([address]     Hyperlipidemia     Hypertension     Insomnia     [facility]     Pneumonia     Psychiatric problem     Thyroid disease     Type II or unspecified type diabetes mellitus with renal manifestations, not stated as uncontrolled(250.40)     Type II or unspecified type diabetes mellitus without mention of complication, not stated as uncontrolled     Unspecified vitamin D deficiency       Past Surgical History:   Procedure Laterality Date    ANKLE FRACTURE SURGERY      AV FISTULA CREATION  12/14/2021    BACK SURGERY      BREAST SURGERY      CARDIAC SURGERY      CARPAL TUNNEL RELEASE      CHOLECYSTECTOMY, OPEN N/A     COLONOSCOPY      CORONARY ARTERY BYPASS GRAFT      x3    DIALYSIS FISTULA CREATION Left 12/14/2021    LEFT AV FISTULA CREATION UPPER EXTREMITY performed by Noel Sanchez MD at [address]      HAND SURGERY      IR TUNNELED CATHETER PLACEMENT GREATER THAN 5 YEARS  8/18/2021    IR TUNNELED CATHETER PLACEMENT GREATER THAN 5 YEARS 8/18/2021 [facility] PROCEDURES    KNEE ARTHROSCOPY      right    OTHER SURGICAL HISTORY  04/06/2022    cvc exchange    TONSILLECTOMY       Family History   Problem Relation Age of Onset    Diabetes Father     Heart Failure Father      Social History     Socioeconomic History    Marital status: Single     Spouse name: None    Number of children: None    Years of education: None    Highest education level: None   Occupational History    None   Tobacco Use    Smoking status: Never Smoker    Smokeless tobacco: Never Used   Vaping Use    Vaping Use: Never used   Substance and Sexual Activity    Alcohol use: No    Drug use: No    Sexual activity: Not Currently   Other Topics Concern    None   Social History Narrative    None     Social Determinants of Health     Financial Resource Strain: Low Risk     Difficulty of Paying Living Expenses: Not very hard   Food Insecurity: No Food Insecurity    Worried About Running Out of Food in the Last Year: Never true    Nany of Food in the Last Year: Never true   Transportation Needs: No Transportation Needs    Lack of Transportation (Medical): No    Lack of Transportation (Non-Medical): No   Physical Activity: Inactive    Days of Exercise per Week: 0 days    Minutes of Exercise per Session: 0 min   Stress: Stress Concern Present    Feeling of Stress :  To some extent   Social Connections: Socially Isolated    Frequency of Communication with Friends and Family: More than three times a week    Frequency of Social Gatherings with Friends and Family: More than three times a week    Attends Islam Services: Never    Active Member of Clubs or MISC 1 each by Does not apply route daily 100 each 5    HUMALOG 100 UNIT/ML injection vial Inject into the skin 3 times daily (before meals) Indications: 15 units and sliding scale (patient reports only the sliding scale)       Lancets MISC 1 each by Does not apply route daily 100 each 11    febuxostat (ULORIC) 40 MG TABS tablet Take 1 tablet by mouth daily 30 tablet 0    docusate sodium (COLACE) 100 MG capsule Take 1 capsule by mouth 2 times daily 60 capsule 0    tamsulosin (FLOMAX) 0.4 MG capsule Take 1 capsule by mouth daily 30 capsule 3    aspirin 81 MG chewable tablet Take 1 tablet by mouth daily 30 tablet 0    ranolazine (RANEXA) 1000 MG extended release tablet Take 1 tablet by mouth 2 times daily 60 tablet 0    pantoprazole (PROTONIX) 40 MG tablet Take 1 tablet by mouth every morning (before breakfast) 30 tablet 0    ReliOn Lancets Micro-Thin 33G MISC USE AS DIRECTED EVERY DAY      Blood Glucose Monitoring Suppl (TRUE METRIX GO GLUCOSE METER) w/Device KIT 1 each by Does not apply route 4 times daily 1 kit 1     No current facility-administered medications for this visit. Allergies   Allergen Reactions    Adhesive Tape Other (See Comments) and Rash     Other reaction(s): Unknown    Ace Inhibitors Other (See Comments)     Cough, states not good for her kidneys    Iv Dye [Iodides]      Stage 4 kidney disease    Nsaids      CANNOT TAKE D/T KIDNEY DISEASE    Metformin And Related Hives, Itching and Rash     Stage 4 kidney disease. Subjective:      Review of Systems  Constitutional: Negative for fever, chills and unexpected weight change. HENT: Negative for trouble swallowing. Respiratory: Negative for cough and shortness of breath. Cardiovascular: Negative for chest pain. Endocrine: Negative for polyuria. Genitourinary: Negative for dysuria, urgency, frequency, incontinence and difficulty urinating. Gastrointestinal: Negative for constipation or diarrhea.    Musculoskeletal: Positive for arthralgias and myalgias. Neurological: Negative for headaches, numbness, or tingling. Intermittent confusion. Psychiatric: Negative for depressed mood or anxiety. Objective:     Physical Exam  /78   Temp 97.5 °F (36.4 °C)   Ht 5' 5\" (1.651 m)   BMI 34.49 kg/m²   Constitutional: She appears well-developed and well-nourished. In no distress. HEENT: NCAT, PERRL, EOMI. Mucous membranes pink and moist.  Pulmonary/Chest: Respirations WNL and unlabored. MSK: Functional ROM BUEs. Palpable trigger points R thoracic paraspinal muscles, R levator scapulae, and R trapezius. Strength 4+/5 key muscles BUEs  Neurological: She is alert and oriented to person, place, and time. DTRs 2+ and symmetric   Skin: Skin is warm dry and intact with good turgor. Psychiatric: She has a normal mood and affect. Her behavior is normal. Thought content normal.   Medical assistant note and vitals for today's encounter reviewed. Diagnostic Studies:     Results for Keli Albarado (MRN [id number]) as of 7/6/2022 17:22   Ref.  Range 7/5/2022 05:54   Sodium Latest Ref Range: 135 - 144 mmol/L 135   Potassium Latest Ref Range: 3.7 - 5.3 mmol/L 4.2   Chloride Latest Ref Range: 98 - 107 mmol/L 91 (L)   CO2 Latest Ref Range: 20 - 31 mmol/L 23   BUN,BUNPL Latest Ref Range: 8 - 23 mg/dL 42 (H)   Creatinine Latest Ref Range: 0.50 - 0.90 mg/dL 4.45 (H)   Bun/Cre Ratio Latest Ref Range: 9 - 20  9   Anion Gap Latest Ref Range: 9 - 17 mmol/L 21 (H)   GFR Non- Latest Ref Range: >60 mL/min 10 (L)   GFR  Latest Ref Range: >60 mL/min 12 (L)   GLUCOSE, FASTING,GF Latest Ref Range: 70 - 99 mg/dL 105 (H)   CALCIUM, SERUM, 802124 Latest Ref Range: 8.6 - 10.4 mg/dL 9.1   GFR Comment Unknown Pend   WBC Latest Ref Range: 3.5 - 11.3 k/uL 5.4   RBC Latest Ref Range: 3.95 - 5.11 m/uL 3.65 (L)   Hemoglobin Quant Latest Ref Range: 11.9 - 15.1 g/dL 11.4 (L)   Hematocrit Latest Ref Range: 36.3 - 47.1 % 37.1 MCV Latest Ref Range: 82.6 - 102.9 fL 101.6   MCH Latest Ref Range: 25.2 - 33.5 pg 31.2   MCHC Latest Ref Range: 28.4 - 34.8 g/dL 30.7   MPV Latest Ref Range: 8.1 - 13.5 fL 10.9   RDW Latest Ref Range: 11.8 - 14.4 % 18.1 (H)   Platelet Count Latest Ref Range: 138 - 453 k/uL 198       PROCEDURE:  Trigger Point Procedural Note    Indication: Severe pain and pain control    Procedure: 10 Trigger Points were identified in the R trapezius, R levator scapulae, and thoracic paraspinal muscles. The patient was placed in the appropriate position and the area over the trigger point was prepped with iodine and alcohol. Injection was performed into the trigger point area using 0.5 ml Lidocaine 1% into each of the 10 trigger point(s) followed by dry needling. The injection site was covered with a bandage. Complications: None, patient tolerated procedure well. Assessment:       Diagnosis Orders   1. Myalgia     2. Spasm of muscle          Plan:      Trigger point injections as above. Can repeat in 6 weeks if indicated. Patient to monitor pain relief and duration. Discontinued baclofen. Will review possible muscle relaxer options for routine use with her nephrologist Dr. Ibrahim Manual - message sent. Ordered E-stim trial in physical therapy at her [facility]) with possible TENS for home if provides relief. No orders of the defined types were placed in this encounter. Orders Placed This Encounter   Medications    lidocaine 1 % injection 5 mL     Return in about 8 weeks (around 8/31/2022). Electronically signedby Fernando Fowler MD on 7/6/2022 at 4:01 PM.     Please note that this chartwas generated using voice recognition Dragon dictation software. Although everyeffort was made to ensure the accuracy of this automated transcription, some errorsin transcription may have occurred.

## 2022-07-20 ENCOUNTER — HOSPITAL ENCOUNTER (OUTPATIENT)
Age: 62
Setting detail: SPECIMEN
Discharge: HOME | End: 2022-07-20

## 2022-07-20 LAB
ANION GAP SERPL CALCULATED.3IONS-SCNC: 12 MMOL/L (ref 9–17)
BUN BLDV-MCNC: 29 MG/DL (ref 8–23)
BUN/CREAT BLD: 9 (ref 9–20)
CALCIUM SERPL-MCNC: 9.2 MG/DL (ref 8.6–10.4)
CHLORIDE BLD-SCNC: 98 MMOL/L (ref 98–107)
CO2: 29 MMOL/L (ref 20–31)
CREAT SERPL-MCNC: 3.27 MG/DL (ref 0.5–0.9)
GFR AFRICAN AMERICAN: 17 ML/MIN
GFR NON-AFRICAN AMERICAN: 14 ML/MIN
GFR SERPL CREATININE-BSD FRML MDRD: ABNORMAL ML/MIN/{1.73_M2}
GLUCOSE BLD-MCNC: 98 MG/DL (ref 70–99)
HCT VFR BLD CALC: 33.2 % (ref 36.3–47.1)
HEMOGLOBIN: 10 G/DL (ref 11.9–15.1)
MCH RBC QN AUTO: 31.3 PG (ref 25.2–33.5)
MCHC RBC AUTO-ENTMCNC: 30.1 G/DL (ref 28.4–34.8)
MCV RBC AUTO: 104.1 FL (ref 82.6–102.9)
NRBC AUTOMATED: 0 PER 100 WBC
PDW BLD-RTO: 16.7 % (ref 11.8–14.4)
PLATELET # BLD: 199 K/UL (ref 138–453)
PMV BLD AUTO: 10.3 FL (ref 8.1–13.5)
POTASSIUM SERPL-SCNC: 4.2 MMOL/L (ref 3.7–5.3)
RBC # BLD: 3.19 M/UL (ref 3.95–5.11)
SODIUM BLD-SCNC: 139 MMOL/L (ref 135–144)
WBC # BLD: 5.2 K/UL (ref 3.5–11.3)

## 2022-07-20 PROCEDURE — 80048 BASIC METABOLIC PNL TOTAL CA: CPT

## 2022-07-20 PROCEDURE — 83036 HEMOGLOBIN GLYCOSYLATED A1C: CPT

## 2022-07-20 PROCEDURE — P9603 ONE-WAY ALLOW PRORATED MILES: HCPCS

## 2022-07-20 PROCEDURE — 36415 COLL VENOUS BLD VENIPUNCTURE: CPT

## 2022-07-20 PROCEDURE — 85027 COMPLETE CBC AUTOMATED: CPT

## 2022-07-22 LAB
ESTIMATED AVERAGE GLUCOSE: 140 MG/DL
HBA1C MFR BLD: 6.5 % (ref 4–6)

## 2022-07-31 ENCOUNTER — HOSPITAL ENCOUNTER (OUTPATIENT)
Age: 62
Setting detail: SPECIMEN
Discharge: HOME | End: 2022-07-31
Payer: COMMERCIAL

## 2022-07-31 PROCEDURE — 87077 CULTURE AEROBIC IDENTIFY: CPT

## 2022-07-31 PROCEDURE — 87086 URINE CULTURE/COLONY COUNT: CPT

## 2022-07-31 PROCEDURE — 87186 SC STD MICRODIL/AGAR DIL: CPT

## 2022-07-31 PROCEDURE — 81001 URINALYSIS AUTO W/SCOPE: CPT

## 2022-07-31 PROCEDURE — 87076 CULTURE ANAEROBE IDENT EACH: CPT

## 2022-07-31 NOTE — PROGRESS NOTES
HEMODIALYSIS POST TREATMENT NOTE    Treatment time ordered: 3Hrs   Actual treatment time: 3Hrs    UltraFiltration Goal: 2Kgs  UltraFiltration Removed: 2.1Kgs      Pre Treatment weight: 114.7Kgs  Post Treatment weight: 112.5Kgs  Estimated Dry Weight: Unknown at this time    Access used:     Central Venous Catheter:          Tunneled or Non-tunneled: Tunelled           Site: R chest          Access Flow: poor      Internal Access:       AV Fistula or AV Graft: N/A         Site: N/A       Access Flow: N/A       Sign and symptoms of infection: No       If YES: N/A    Medications or blood products given: See MAR    Chronic outpatient schedule: [facility]    Chronic outpatient unit: [facility]    Summary of response to treatment: Tolerated fairly well with CVC issues at beginning of trx and activase instilled for 1hr and treatment restarted with no issues. 2.1Kgs fluid removal    Explain if orders NOT met, was physician notified:N/A      ACES flowsheet faxed to patient unit/ placed in patient chart: yes    Post assessment completed: yes by Yesica Cochran RN    Report given to: Magdalene Ibarra documented Safety Checks include the followin) Access and face visible at all times. 2) All connections and blood lines are secure with no kinks. 3) NVL alarm engaged. 4) Hemosafe device applied (if applicable). 5) No collapse of Arterial or Venous blood chambers. 6) All blood lines / pump segments in the air detectors. negative

## 2022-08-01 LAB
-: ABNORMAL
AMORPHOUS: ABNORMAL
BACTERIA: ABNORMAL
BILIRUBIN URINE: NEGATIVE
COLOR: YELLOW
EPITHELIAL CELLS UA: ABNORMAL /HPF (ref 0–5)
GLUCOSE URINE: NEGATIVE
KETONES, URINE: NEGATIVE
LEUKOCYTE ESTERASE, URINE: ABNORMAL
MUCUS: ABNORMAL
NITRITE, URINE: NEGATIVE
PH UA: 8 (ref 5–8)
PROTEIN UA: ABNORMAL
RBC UA: ABNORMAL /HPF (ref 0–2)
SPECIFIC GRAVITY UA: 1.01 (ref 1–1.03)
TURBIDITY: ABNORMAL
URINE HGB: NEGATIVE
UROBILINOGEN, URINE: NORMAL
WBC UA: ABNORMAL /HPF (ref 0–5)

## 2022-08-02 ENCOUNTER — APPOINTMENT (OUTPATIENT)
Dept: CT IMAGING | Age: 62
DRG: 640 | End: 2022-08-02
Payer: MEDICARE

## 2022-08-02 ENCOUNTER — HOSPITAL ENCOUNTER (INPATIENT)
Age: 62
LOS: 4 days | Discharge: NURSING, REHAB OR LONG-TERM CARE | DRG: 640 | End: 2022-08-09
Attending: EMERGENCY MEDICINE | Admitting: INTERNAL MEDICINE
Payer: MEDICARE

## 2022-08-02 DIAGNOSIS — R41.0 DISORIENTATION: Primary | ICD-10-CM

## 2022-08-02 DIAGNOSIS — N18.6 ESRD (END STAGE RENAL DISEASE) (HCC): ICD-10-CM

## 2022-08-02 PROBLEM — R41.82 ALTERED MENTAL STATUS: Status: ACTIVE | Noted: 2022-08-02

## 2022-08-02 LAB
-: ABNORMAL
ABSOLUTE EOS #: 0.18 K/UL (ref 0–0.44)
ABSOLUTE IMMATURE GRANULOCYTE: 0.09 K/UL (ref 0–0.3)
ABSOLUTE LYMPH #: 0.91 K/UL (ref 1.1–3.7)
ABSOLUTE MONO #: 0.57 K/UL (ref 0.1–1.2)
ALBUMIN SERPL-MCNC: 3.6 G/DL (ref 3.5–5.2)
ALBUMIN/GLOBULIN RATIO: 0.9 (ref 1–2.5)
ALP BLD-CCNC: 138 U/L (ref 35–104)
ALT SERPL-CCNC: 14 U/L (ref 5–33)
AMMONIA: 14 UMOL/L (ref 11–51)
ANION GAP SERPL CALCULATED.3IONS-SCNC: 10 MMOL/L (ref 9–17)
AST SERPL-CCNC: 20 U/L
BACTERIA: ABNORMAL
BASOPHILS # BLD: 1 % (ref 0–2)
BASOPHILS ABSOLUTE: 0.04 K/UL (ref 0–0.2)
BILIRUB SERPL-MCNC: 0.48 MG/DL (ref 0.3–1.2)
BILIRUBIN URINE: NEGATIVE
BUN BLDV-MCNC: 13 MG/DL (ref 8–23)
CALCIUM SERPL-MCNC: 9.4 MG/DL (ref 8.6–10.4)
CASTS UA: ABNORMAL /LPF (ref 0–8)
CHLORIDE BLD-SCNC: 96 MMOL/L (ref 98–107)
CO2: 29 MMOL/L (ref 20–31)
COLOR: ABNORMAL
CREAT SERPL-MCNC: 1.6 MG/DL (ref 0.5–0.9)
EOSINOPHILS RELATIVE PERCENT: 3 % (ref 1–4)
EPITHELIAL CELLS UA: ABNORMAL /HPF (ref 0–5)
GLUCOSE BLD-MCNC: 229 MG/DL (ref 70–99)
GLUCOSE URINE: NEGATIVE
HCT VFR BLD CALC: 30.2 % (ref 36.3–47.1)
HEMOGLOBIN: 9.9 G/DL (ref 11.9–15.1)
IMMATURE GRANULOCYTES: 1 %
KETONES, URINE: NEGATIVE
LEUKOCYTE ESTERASE, URINE: ABNORMAL
LYMPHOCYTES # BLD: 13 % (ref 24–43)
MCH RBC QN AUTO: 32.5 PG (ref 25.2–33.5)
MCHC RBC AUTO-ENTMCNC: 32.8 G/DL (ref 28.4–34.8)
MCV RBC AUTO: 99 FL (ref 82.6–102.9)
MONOCYTES # BLD: 8 % (ref 3–12)
NITRITE, URINE: NEGATIVE
NRBC AUTOMATED: 0 PER 100 WBC
PDW BLD-RTO: 17.3 % (ref 11.8–14.4)
PH UA: 6.5 (ref 5–8)
PLATELET # BLD: 216 K/UL (ref 138–453)
PMV BLD AUTO: 10 FL (ref 8.1–13.5)
POTASSIUM SERPL-SCNC: 3.8 MMOL/L (ref 3.7–5.3)
PROTEIN UA: ABNORMAL
RBC # BLD: 3.05 M/UL (ref 3.95–5.11)
RBC # BLD: ABNORMAL 10*6/UL
RBC UA: ABNORMAL /HPF (ref 0–4)
SEG NEUTROPHILS: 74 % (ref 36–65)
SEGMENTED NEUTROPHILS ABSOLUTE COUNT: 5.01 K/UL (ref 1.5–8.1)
SODIUM BLD-SCNC: 135 MMOL/L (ref 135–144)
SPECIFIC GRAVITY UA: 1.02 (ref 1–1.03)
TOTAL PROTEIN: 7.4 G/DL (ref 6.4–8.3)
TROPONIN, HIGH SENSITIVITY: 101 NG/L (ref 0–14)
TROPONIN, HIGH SENSITIVITY: 90 NG/L (ref 0–14)
TURBIDITY: ABNORMAL
URINE HGB: ABNORMAL
UROBILINOGEN, URINE: NORMAL
WBC # BLD: 6.8 K/UL (ref 3.5–11.3)
WBC UA: ABNORMAL /HPF (ref 0–5)

## 2022-08-02 PROCEDURE — 87086 URINE CULTURE/COLONY COUNT: CPT

## 2022-08-02 PROCEDURE — 70450 CT HEAD/BRAIN W/O DYE: CPT

## 2022-08-02 PROCEDURE — 81001 URINALYSIS AUTO W/SCOPE: CPT

## 2022-08-02 PROCEDURE — 93005 ELECTROCARDIOGRAM TRACING: CPT | Performed by: STUDENT IN AN ORGANIZED HEALTH CARE EDUCATION/TRAINING PROGRAM

## 2022-08-02 PROCEDURE — 87077 CULTURE AEROBIC IDENTIFY: CPT

## 2022-08-02 PROCEDURE — 82140 ASSAY OF AMMONIA: CPT

## 2022-08-02 PROCEDURE — 99285 EMERGENCY DEPT VISIT HI MDM: CPT

## 2022-08-02 PROCEDURE — 80307 DRUG TEST PRSMV CHEM ANLYZR: CPT

## 2022-08-02 PROCEDURE — 87186 SC STD MICRODIL/AGAR DIL: CPT

## 2022-08-02 PROCEDURE — G0378 HOSPITAL OBSERVATION PER HR: HCPCS

## 2022-08-02 PROCEDURE — 80053 COMPREHEN METABOLIC PANEL: CPT

## 2022-08-02 PROCEDURE — 85025 COMPLETE CBC W/AUTO DIFF WBC: CPT

## 2022-08-02 PROCEDURE — 84484 ASSAY OF TROPONIN QUANT: CPT

## 2022-08-02 RX ORDER — ACETAMINOPHEN 650 MG/1
650 SUPPOSITORY RECTAL EVERY 6 HOURS PRN
Status: DISCONTINUED | OUTPATIENT
Start: 2022-08-02 | End: 2022-08-09 | Stop reason: HOSPADM

## 2022-08-02 RX ORDER — ACETAMINOPHEN 325 MG/1
650 TABLET ORAL EVERY 6 HOURS PRN
Status: DISCONTINUED | OUTPATIENT
Start: 2022-08-02 | End: 2022-08-09 | Stop reason: HOSPADM

## 2022-08-02 RX ORDER — SODIUM CHLORIDE 9 MG/ML
INJECTION, SOLUTION INTRAVENOUS PRN
Status: DISCONTINUED | OUTPATIENT
Start: 2022-08-02 | End: 2022-08-09 | Stop reason: HOSPADM

## 2022-08-02 RX ORDER — SODIUM CHLORIDE 0.9 % (FLUSH) 0.9 %
5-40 SYRINGE (ML) INJECTION EVERY 12 HOURS SCHEDULED
Status: DISCONTINUED | OUTPATIENT
Start: 2022-08-02 | End: 2022-08-09 | Stop reason: HOSPADM

## 2022-08-02 RX ORDER — POLYETHYLENE GLYCOL 3350 17 G/17G
17 POWDER, FOR SOLUTION ORAL DAILY PRN
Status: DISCONTINUED | OUTPATIENT
Start: 2022-08-02 | End: 2022-08-09 | Stop reason: HOSPADM

## 2022-08-02 RX ORDER — ONDANSETRON 2 MG/ML
4 INJECTION INTRAMUSCULAR; INTRAVENOUS EVERY 6 HOURS PRN
Status: DISCONTINUED | OUTPATIENT
Start: 2022-08-02 | End: 2022-08-09 | Stop reason: HOSPADM

## 2022-08-02 RX ORDER — ENOXAPARIN SODIUM 100 MG/ML
40 INJECTION SUBCUTANEOUS DAILY
Status: DISCONTINUED | OUTPATIENT
Start: 2022-08-03 | End: 2022-08-03

## 2022-08-02 RX ORDER — SODIUM CHLORIDE 0.9 % (FLUSH) 0.9 %
10 SYRINGE (ML) INJECTION PRN
Status: DISCONTINUED | OUTPATIENT
Start: 2022-08-02 | End: 2022-08-09 | Stop reason: HOSPADM

## 2022-08-02 RX ORDER — ONDANSETRON 4 MG/1
4 TABLET, ORALLY DISINTEGRATING ORAL EVERY 8 HOURS PRN
Status: DISCONTINUED | OUTPATIENT
Start: 2022-08-02 | End: 2022-08-09 | Stop reason: HOSPADM

## 2022-08-02 NOTE — ED PROVIDER NOTES
[facility] ONC/MED SURG  Emergency Department Encounter  EmergencyMedicine Resident     Pt Name:Estefany Olivas  MRN: [id number]  [place] 1960  Date of evaluation: 8/2/22  PCP:  No primary care provider on file. This patient was evaluated in the Emergency Department for symptoms described in the history of present illness. The patient was evaluated in the context of the global COVID-19 pandemic, which necessitated consideration that the patient might be at risk for infection with the SARS-CoV-2 virus that causes COVID-19. Institutional protocols and algorithms that pertain to the evaluation of patients at risk for COVID-19 are in a state of rapid change based on information released by regulatory bodies including the CDC and federal and state organizations. These policies and algorithms were followed during the patient's care in the ED. CHIEF COMPLAINT       Chief Complaint   Patient presents with    Altered Mental Status       HISTORY OF PRESENT ILLNESS  (Location/Symptom, Timing/Onset, Context/Setting, Quality, Duration, Modifying Factors, Severity.)      Jill Riddle is a 57 y.o. female who presents with altered mental status after dialysis session. Per EMS, patient has had similar history of altered mentation after dialysis sessions. Patient is not alert or oriented to person, place, location or situation. She states she believes her name must be Arsalan Pena because people keep calling her that, but she is not certain. She is unable to give any medical history at this time. She is denying any current symptoms, including headache, lightheadedness, dizziness, chest pain, shortness of breath, abdominal pain, nausea, vomiting, numbness, tingling, weakness. PAST MEDICAL / SURGICAL / SOCIAL / FAMILY HISTORY      has no past medical history on file. Patient unable to confirm medical history due to AMS     has no past surgical history on file.   Patient unable to confirm surgical history due to AMS    Social History     Socioeconomic History    Marital status: Single     Spouse name: Not on file    Number of children: Not on file    Years of education: Not on file    Highest education level: Not on file   Occupational History    Not on file   Tobacco Use    Smoking status: Not on file    Smokeless tobacco: Not on file   Substance and Sexual Activity    Alcohol use: Not on file    Drug use: Not on file    Sexual activity: Not on file   Other Topics Concern    Not on file   Social History Narrative    Not on file     Social Determinants of Health     Financial Resource Strain: Not on file   Food Insecurity: Not on file   Transportation Needs: Not on file   Physical Activity: Not on file   Stress: Not on file   Social Connections: Not on file   Intimate Partner Violence: Not on file   Housing Stability: Not on file       No family history on file. Allergies:  Patient has no allergy information on record. Home Medications:  Prior to Admission medications    Not on File       REVIEW OF SYSTEMS    (2-9 systems for level 4, 10 or more for level 5)      Review of Systems   Constitutional:  Negative for chills, fatigue and fever. Patient speaking in full sentences, airway intact. Patient not oriented to self, time, place, location, or situation. She is able to answer questions about her current symptoms, but not able to answer any questions about her past history. She states she believes her name is Natasha Weaver, as \"people keep calling her that\". HENT:  Negative for congestion, rhinorrhea and sore throat. Eyes:  Negative for visual disturbance. Respiratory:  Negative for cough and shortness of breath. Cardiovascular:  Negative for chest pain. Gastrointestinal:  Negative for abdominal pain, diarrhea, nausea and vomiting. Genitourinary:  Negative for dysuria and hematuria. Musculoskeletal:  Negative for arthralgias and myalgias. Skin:  Negative for pallor and rash.    Neurological: Negative for dizziness, tremors, syncope, weakness, light-headedness, numbness and headaches. All other systems reviewed and are negative. PHYSICAL EXAM   (up to 7 for level 4, 8 or more for level 5)      INITIAL VITALS:   BP (!) 133/59   Pulse 79   Temp 98.1 °F (36.7 °C) (Oral)   Resp 16   Wt 225 lb 12 oz (102.4 kg)   SpO2 93%     Physical Exam  Vitals reviewed. Constitutional:       Comments: Not oriented to time, self, place, situation. Speaking in full sentences, airway intact   HENT:      Head: Normocephalic and atraumatic. Mouth/Throat:      Mouth: Mucous membranes are moist.      Pharynx: Oropharynx is clear. Eyes:      Extraocular Movements: Extraocular movements intact. Pupils: Pupils are equal, round, and reactive to light. Cardiovascular:      Rate and Rhythm: Normal rate and regular rhythm. Pulses: Normal pulses. Heart sounds: Normal heart sounds. Pulmonary:      Effort: Pulmonary effort is normal.      Breath sounds: Normal breath sounds. No decreased breath sounds, wheezing, rhonchi or rales. Abdominal:      Palpations: Abdomen is soft. Tenderness: There is no abdominal tenderness. There is no guarding or rebound. Musculoskeletal:         General: Normal range of motion. Cervical back: Normal range of motion and neck supple. Right lower leg: No edema. Left lower leg: No edema. Skin:     Capillary Refill: Capillary refill takes less than 2 seconds. Comments: Wound to right foot   Neurological:      General: No focal deficit present. Mental Status: She is alert. She is disoriented and confused. GCS: GCS eye subscore is 4. GCS verbal subscore is 4. GCS motor subscore is 6. Cranial Nerves: No cranial nerve deficit, dysarthria or facial asymmetry. Motor: No weakness.        DIFFERENTIAL  DIAGNOSIS     PLAN (LABS / IMAGING / EKG):  Orders Placed This Encounter   Procedures    Culture, Urine    CT HEAD WO CONTRAST    CBC with Auto Differential    Comprehensive Metabolic Panel    Ammonia    Urinalysis with Reflex to Culture    Troponin    Troponin    Microscopic Urinalysis    Basic Metabolic Panel w/ Reflex to MG    Protime-INR    CBC with Auto Differential    ADULT DIET;  Regular    Bladder scan    Straight cath    Vital signs per unit routine    Notify physician    Up with assistance    Daily weights    Intake and output    Telemetry monitoring - 24 hour duration    Height and weight    Full Code    Inpatient consult to Neurology    Inpatient consult to Nephrology    Inpatient consult to Hospitalist    Consult to Neurology    Consult to Nephrology    OT eval and treat    PT evaluation and treat    Initiate Oxygen Therapy Protocol    Pulse Oximetry Spot Check    EKG 12 Lead    Place in Observation Service       MEDICATIONS ORDERED:  Orders Placed This Encounter   Medications    sodium chloride flush 0.9 % injection 5-40 mL    sodium chloride flush 0.9 % injection 10 mL    0.9 % sodium chloride infusion    enoxaparin (LOVENOX) injection 40 mg     Order Specific Question:   Indication of Use     Answer:   Prophylaxis-DVT/PE    OR Linked Order Group     ondansetron (ZOFRAN-ODT) disintegrating tablet 4 mg     ondansetron (ZOFRAN) injection 4 mg    polyethylene glycol (GLYCOLAX) packet 17 g    OR Linked Order Group     acetaminophen (TYLENOL) tablet 650 mg     acetaminophen (TYLENOL) suppository 650 mg       DDX: Ingestion, infectious, trauma, seizure, AMS, electrolytes, encephalopathy, insulin, opiates, uremia, toxins, tumor, thyrotoxicosis, psychiatric, sepsis, stroke, N/V, seizure, headache, elderly age, alcohol / drugs / toxidromes, signs of trauma      DIAGNOSTIC RESULTS / [facility]   LAB RESULTS:  Results for orders placed or performed during the hospital encounter of 08/02/22   CBC with Auto Differential   Result Value Ref Range    WBC 6.8 3.5 - 11.3 k/uL    RBC 3.05 (L) 3.95 - 5.11 m/uL    Hemoglobin 9.9 (L) 11.9 - 15.1 g/dL    Hematocrit 30.2 (L) 36.3 - 47.1 %    MCV 99.0 82.6 - 102.9 fL    MCH 32.5 25.2 - 33.5 pg    MCHC 32.8 28.4 - 34.8 g/dL    RDW 17.3 (H) 11.8 - 14.4 %    Platelets 436 113 - 153 k/uL    MPV 10.0 8.1 - 13.5 fL    NRBC Automated 0.0 0.0 per 100 WBC    Seg Neutrophils 74 (H) 36 - 65 %    Lymphocytes 13 (L) 24 - 43 %    Monocytes 8 3 - 12 %    Eosinophils % 3 1 - 4 %    Basophils 1 0 - 2 %    Immature Granulocytes 1 (H) 0 %    Segs Absolute 5.01 1.50 - 8.10 k/uL    Absolute Lymph # 0.91 (L) 1.10 - 3.70 k/uL    Absolute Mono # 0.57 0.10 - 1.20 k/uL    Absolute Eos # 0.18 0.00 - 0.44 k/uL    Basophils Absolute 0.04 0.00 - 0.20 k/uL    Absolute Immature Granulocyte 0.09 0.00 - 0.30 k/uL    RBC Morphology ANISOCYTOSIS PRESENT    Comprehensive Metabolic Panel   Result Value Ref Range    Glucose 229 (H) 70 - 99 mg/dL    BUN 13 8 - 23 mg/dL    Creatinine 1.60 (H) 0.50 - 0.90 mg/dL    Calcium 9.4 8.6 - 10.4 mg/dL    Sodium 135 135 - 144 mmol/L    Potassium 3.8 3.7 - 5.3 mmol/L    Chloride 96 (L) 98 - 107 mmol/L    CO2 29 20 - 31 mmol/L    Anion Gap 10 9 - 17 mmol/L    Alkaline Phosphatase 138 (H) 35 - 104 U/L    ALT 14 5 - 33 U/L    AST 20 <32 U/L    Total Bilirubin 0.48 0.3 - 1.2 mg/dL    Total Protein 7.4 6.4 - 8.3 g/dL    Albumin 3.6 3.5 - 5.2 g/dL    Albumin/Globulin Ratio 0.9 (L) 1.0 - 2.5   Ammonia   Result Value Ref Range    Ammonia 14 11 - 51 umol/L   Urinalysis with Reflex to Culture    Specimen: Urine   Result Value Ref Range    Color, UA Dark Yellow (A) Yellow    Turbidity UA Turbid (A) Clear    Glucose, Ur NEGATIVE NEGATIVE    Bilirubin Urine NEGATIVE NEGATIVE    Ketones, Urine NEGATIVE NEGATIVE    Specific Gravity, UA 1.016 1.005 - 1.030    Urine Hgb TRACE (A) NEGATIVE    pH, UA 6.5 5.0 - 8.0    Protein, UA 2+ (A) NEGATIVE    Urobilinogen, Urine Normal Normal    Nitrite, Urine NEGATIVE NEGATIVE    Leukocyte Esterase, Urine LARGE (A) NEGATIVE   Troponin   Result Value Ref Range    Troponin, High Sensitivity 101 (HH) 0 - 14 ng/L   Troponin   Result Value Ref Range    Troponin, High Sensitivity 90 (HH) 0 - 14 ng/L   Microscopic Urinalysis   Result Value Ref Range    -          WBC, UA TOO NUMEROUS TO COUNT 0 - 5 /HPF    RBC, UA 2 TO 5 0 - 4 /HPF    Casts UA  0 - 8 /LPF     2 TO 5 HYALINE Reference range defined for non-centrifuged specimen. Epithelial Cells UA None 0 - 5 /HPF    Bacteria, UA MANY (A) None       IMPRESSION: Female patient presenting from dialysis with altered mentation. Per EMS, patient had similar history of altered mentation after dialysis sessions. Patient is not oriented to self, situation, place, time. She is talking in complete sentences, airway intact. She states she believes her name is Billy Lipschutz because people keep referring to her as such, but she does not know if that is her actual name. She denies any current medical complaints. Vitals stable. Heart RRR, lungs CTA B, abdomen soft and nontender. Patient denying chest pain, shortness of breath, fevers, chills, pain. Will obtain altered mentation work-up including CBC, CMP, ammonia, UA, CT head, troponin, EKG. Sisters and POA at bedside later during admission, discussed the case with them. Sister states the patient has had several episodes in the past of becoming altered after dialysis. For 1 of these episodes, she remained altered for several weeks after the initial incident. She has had unsteady gait recently, and she did have one episode of urinary incontinence at home. Her sister states that she also occasionally has wide, flapping uncontrolled tremors of bilateral hands that later resolved. Yesterday she had an episode of hypoglycemia with a glucose of 43, she was given a chocolate bar, and continued to have a decline in her glucose. Patient subsequently had loss of consciousness. She did not awakened and her family corrected her glucose. She has a wound to her right toe.   Concern for early onset normal pressure hydrocephalus versus encephalopathy versus asterixis/hepatic encephalopathy versus dialysis disequilibrium syndrome versus dialysis complication. Will discuss case with neurology and nephrology, and admit patient for further work-up. RADIOLOGY:  CT HEAD WO CONTRAST    Result Date: 8/2/2022  EXAMINATION: CT OF THE HEAD WITHOUT CONTRAST  8/2/2022 5:03 pm TECHNIQUE: CT of the head was performed without the administration of intravenous contrast. Automated exposure control, iterative reconstruction, and/or weight based adjustment of the mA/kV was utilized to reduce the radiation dose to as low as reasonably achievable. COMPARISON: None. HISTORY: ORDERING SYSTEM PROVIDED HISTORY: AMS after dialysis TECHNOLOGIST PROVIDED HISTORY: AMS after dialysis Decision Support Exception - unselect if not a suspected or confirmed emergency medical condition->Emergency Medical Condition (MA) Reason for Exam: AMS after dialysis FINDINGS: There is no acute infarction, intracranial hemorrhage or intracranial mass lesion. No mass effect, midline shift or extra-axial collection is noted. There are mild nonspecific foci of periventricular and subcortical cerebral white matter hypodensity, most likely representing chronic microangiopathic disease in this age group. The brain parenchyma is otherwise normal. The cerebellar tonsils are in normal position. The ventricles, sulci, and cisterns are mildly prominent suggestive of mild generalized volume loss. The globes and orbits are within normal limits. The visualized extracranial structures including paranasal sinuses and mastoid air cells are unremarkable. No fracture is identified. Extensive scalp vascular calcification. No evidence for acute intracranial hemorrhage, territorial infarction or intracranial mass lesion. Mild chronic microangiopathic ischemic disease. Mild generalized volume loss.       EKG  EKG: normal sinus rhythm, occasional PVC noted, unifocal.      All EKG's are interpreted by the Emergency Department Physician who either signs or Co-signs this chart in the absence of a cardiologist.    EMERGENCY DEPARTMENT COURSE:  ED Course as of 08/03/22 0021 Tue Aug 02, 2022   1753 CT head unremarkable [JG]   1754 Troponin, High Sensitivity(!!): 101 [JG]      ED Course User Index  [JG] Calixto Sifuentes MD     CONSULTS:  IP CONSULT TO NEUROLOGY  IP CONSULT TO NEPHROLOGY  IP CONSULT TO HOSPITALIST  IP CONSULT TO NEUROLOGY  IP CONSULT TO NEPHROLOGY    FINAL IMPRESSION      1. Disorientation          DISPOSITION / PLAN     DISPOSITION Admitted 08/02/2022 10:27:15 PM      PATIENT REFERRED TO:  No follow-up provider specified. DISCHARGE MEDICATIONS:  There are no discharge medications for this patient.       Calixto Sifuentes MD  Emergency Medicine Resident    (Please note that portions of thisnote were completed with a voice recognition program.  Efforts were made to edit the dictations but occasionally words are mis-transcribed.)       Calixto Sifuentes MD  Resident  08/03/22 0515

## 2022-08-02 NOTE — ED NOTES
Dr. Calvin Marroquin notified of pt's IV infiltrating and no one being available for US IV placement.      Niesha Plasencia RN  08/02/22 1638

## 2022-08-02 NOTE — ED PROVIDER NOTES
chest pain/shortness of breath/abdominal pain. Unknown if any recent vomiting or diarrhea. Assessment/plan: Patient with confusion and global amnesia. Will perform CT head as well as cardiac work-up and toxic metabolic work-up. Reevaluate after will likely need admission      EKG Interpretation    Interpreted by emergency department physician    Rhythm: normal sinus   Rate: normal at 91 bpm  Axis: normal  Conduction: normal  ST Segments: no acute change  T Waves: no acute change  Q Waves: no acute change    Clinical Impression:  nonspecific EKG. Critical Care  None    This patient was evaluated in the Emergency Department for symptoms described in the history of present illness. He/she was evaluated in the context of the global COVID-19 pandemic, which necessitated consideration that the patient might be at risk for infection with the SARS-CoV-2 virus that causes COVID-19. Institutional protocols and algorithms that pertain to the evaluation of patients at risk for COVID-19 are in a state of rapid change based on information released by regulatory bodies including the CDC and federal and state organizations. These policies and algorithms were followed during the patient's care in the ED. (Please note that portions of this note were completed with a voice recognition program. Efforts were made to edit the dictations but occasionally words are mis-transcribed.  Whenever words are used in this note in any gender, they shall be construed as though they were used in the gender appropriate to the circumstances; and whenever words are used in this note in the singular or plural form, they shall be construed as though they were used in the form appropriate to the circumstances.)    MD Barbi Chiang  Attending Emergency Medicine Physician           Abigail Palacio MD  08/02/22 315 Krista Trevino MD  08/02/22 [id number]

## 2022-08-02 NOTE — ED NOTES
Pt to ED by EMS for altered mental status. Pt was at dialysis down the street when she became altered, not oriented to person, place, or time. Per EMS pt often becomes confused after dialysis. On arrival to ED pt is tearful, anxious, and confused. Pt could not remember name or birthday when asked, but is talking in complete sentences. Respirations even and unlabored. Patient placed on continuous cardiac monitoring, BP cuff, and pulse ox. EKG obtained.           Zoie Benjamin RN  08/02/22 [id number]

## 2022-08-03 ENCOUNTER — APPOINTMENT (OUTPATIENT)
Dept: MRI IMAGING | Age: 62
DRG: 640 | End: 2022-08-03
Payer: MEDICARE

## 2022-08-03 PROBLEM — N18.6 ESRD (END STAGE RENAL DISEASE) ON DIALYSIS (HCC): Status: ACTIVE | Noted: 2022-08-03

## 2022-08-03 PROBLEM — G93.40 ENCEPHALOPATHY: Status: ACTIVE | Noted: 2022-08-03

## 2022-08-03 PROBLEM — R55 SYNCOPE AND COLLAPSE: Status: ACTIVE | Noted: 2022-08-03

## 2022-08-03 PROBLEM — Z99.2 ESRD (END STAGE RENAL DISEASE) ON DIALYSIS (HCC): Status: ACTIVE | Noted: 2022-08-03

## 2022-08-03 LAB
ABSOLUTE EOS #: 0.3 K/UL (ref 0–0.44)
ABSOLUTE IMMATURE GRANULOCYTE: 0.1 K/UL (ref 0–0.3)
ABSOLUTE LYMPH #: 1.16 K/UL (ref 1.1–3.7)
ABSOLUTE MONO #: 0.6 K/UL (ref 0.1–1.2)
AMMONIA: 21 UMOL/L (ref 11–51)
AMPHETAMINE SCREEN URINE: NEGATIVE
ANION GAP SERPL CALCULATED.3IONS-SCNC: 11 MMOL/L (ref 9–17)
BARBITURATE SCREEN URINE: NEGATIVE
BASOPHILS # BLD: 1 % (ref 0–2)
BASOPHILS ABSOLUTE: 0.05 K/UL (ref 0–0.2)
BENZODIAZEPINE SCREEN, URINE: NEGATIVE
BUN BLDV-MCNC: 20 MG/DL (ref 8–23)
C-REACTIVE PROTEIN: 9.8 MG/L (ref 0–5)
CALCIUM SERPL-MCNC: 8.9 MG/DL (ref 8.6–10.4)
CANNABINOID SCREEN URINE: NEGATIVE
CARBOXYHEMOGLOBIN: 1.1 % (ref 0–5)
CARBOXYHEMOGLOBIN: 1.6 % (ref 0–5)
CHLORIDE BLD-SCNC: 99 MMOL/L (ref 98–107)
CO2: 24 MMOL/L (ref 20–31)
COCAINE METABOLITE, URINE: NEGATIVE
CREAT SERPL-MCNC: 2.13 MG/DL (ref 0.5–0.9)
EKG ATRIAL RATE: 91 BPM
EKG P AXIS: 34 DEGREES
EKG P-R INTERVAL: 190 MS
EKG Q-T INTERVAL: 434 MS
EKG QRS DURATION: 98 MS
EKG QTC CALCULATION (BAZETT): 533 MS
EKG R AXIS: -5 DEGREES
EKG T AXIS: 109 DEGREES
EKG VENTRICULAR RATE: 91 BPM
EOSINOPHILS RELATIVE PERCENT: 5 % (ref 1–4)
FENTANYL URINE: NEGATIVE
FIO2: ABNORMAL
FIO2: ABNORMAL
FOLATE: 9.8 NG/ML
GFR AFRICAN AMERICAN: 28 ML/MIN
GFR NON-AFRICAN AMERICAN: 23 ML/MIN
GFR SERPL CREATININE-BSD FRML MDRD: ABNORMAL ML/MIN/{1.73_M2}
GGT: 43 U/L (ref 5–36)
GLUCOSE BLD-MCNC: 208 MG/DL (ref 65–105)
GLUCOSE BLD-MCNC: 220 MG/DL (ref 70–99)
GLUCOSE BLD-MCNC: 275 MG/DL (ref 65–105)
GLUCOSE BLD-MCNC: 280 MG/DL (ref 65–105)
GLUCOSE BLD-MCNC: 294 MG/DL (ref 65–105)
GLUCOSE BLD-MCNC: 306 MG/DL (ref 65–105)
HCO3 VENOUS: 27.1 MMOL/L (ref 24–30)
HCO3 VENOUS: 28.8 MMOL/L (ref 24–30)
HCT VFR BLD CALC: 30.7 % (ref 36.3–47.1)
HEMOGLOBIN: 9.4 G/DL (ref 11.9–15.1)
IMMATURE GRANULOCYTES: 2 %
INR BLD: 0.9
LACTIC ACID, WHOLE BLOOD: 1.7 MMOL/L (ref 0.7–2.1)
LYMPHOCYTES # BLD: 18 % (ref 24–43)
MAGNESIUM: 2 MG/DL (ref 1.6–2.6)
MCH RBC QN AUTO: 32 PG (ref 25.2–33.5)
MCHC RBC AUTO-ENTMCNC: 30.6 G/DL (ref 28.4–34.8)
MCV RBC AUTO: 104.4 FL (ref 82.6–102.9)
METHADONE SCREEN, URINE: NEGATIVE
MONOCYTES # BLD: 9 % (ref 3–12)
NRBC AUTOMATED: 0 PER 100 WBC
O2 SAT, VEN: 26.6 % (ref 60–85)
O2 SAT, VEN: 97.1 % (ref 60–85)
OPIATES, URINE: NEGATIVE
OXYCODONE SCREEN URINE: NEGATIVE
PATIENT TEMP: 37
PATIENT TEMP: 37
PCO2, VEN: 36.5 (ref 39–55)
PCO2, VEN: 42.5 (ref 39–55)
PDW BLD-RTO: 17.5 % (ref 11.8–14.4)
PH VENOUS: 7.45 (ref 7.32–7.42)
PH VENOUS: 7.48 (ref 7.32–7.42)
PHENCYCLIDINE, URINE: NEGATIVE
PLATELET # BLD: 220 K/UL (ref 138–453)
PMV BLD AUTO: 10 FL (ref 8.1–13.5)
PO2, VEN: 107 (ref 30–50)
PO2, VEN: 20 (ref 30–50)
POSITIVE BASE EXCESS, VEN: 4 MMOL/L (ref 0–2)
POSITIVE BASE EXCESS, VEN: 4.7 MMOL/L (ref 0–2)
POTASSIUM SERPL-SCNC: 3.6 MMOL/L (ref 3.7–5.3)
PROTHROMBIN TIME: 10.2 SEC (ref 9.1–12.3)
RBC # BLD: 2.94 M/UL (ref 3.95–5.11)
RBC # BLD: ABNORMAL 10*6/UL
SEDIMENTATION RATE, ERYTHROCYTE: 87 MM/HR (ref 0–30)
SEG NEUTROPHILS: 66 % (ref 36–65)
SEGMENTED NEUTROPHILS ABSOLUTE COUNT: 4.23 K/UL (ref 1.5–8.1)
SODIUM BLD-SCNC: 134 MMOL/L (ref 135–144)
TEST INFORMATION: NORMAL
TSH SERPL DL<=0.05 MIU/L-ACNC: 1.89 UIU/ML (ref 0.3–5)
VITAMIN B-12: 535 PG/ML (ref 232–1245)
WBC # BLD: 6.4 K/UL (ref 3.5–11.3)

## 2022-08-03 PROCEDURE — 6370000000 HC RX 637 (ALT 250 FOR IP): Performed by: NURSE PRACTITIONER

## 2022-08-03 PROCEDURE — 83605 ASSAY OF LACTIC ACID: CPT

## 2022-08-03 PROCEDURE — 93010 ELECTROCARDIOGRAM REPORT: CPT | Performed by: INTERNAL MEDICINE

## 2022-08-03 PROCEDURE — 95816 EEG AWAKE AND DROWSY: CPT | Performed by: PSYCHIATRY & NEUROLOGY

## 2022-08-03 PROCEDURE — G0378 HOSPITAL OBSERVATION PER HR: HCPCS

## 2022-08-03 PROCEDURE — 82140 ASSAY OF AMMONIA: CPT

## 2022-08-03 PROCEDURE — 97535 SELF CARE MNGMENT TRAINING: CPT

## 2022-08-03 PROCEDURE — 87040 BLOOD CULTURE FOR BACTERIA: CPT

## 2022-08-03 PROCEDURE — 86140 C-REACTIVE PROTEIN: CPT

## 2022-08-03 PROCEDURE — 97162 PT EVAL MOD COMPLEX 30 MIN: CPT

## 2022-08-03 PROCEDURE — 85610 PROTHROMBIN TIME: CPT

## 2022-08-03 PROCEDURE — 36415 COLL VENOUS BLD VENIPUNCTURE: CPT

## 2022-08-03 PROCEDURE — 6360000002 HC RX W HCPCS: Performed by: FAMILY MEDICINE

## 2022-08-03 PROCEDURE — 82805 BLOOD GASES W/O2 SATURATION: CPT

## 2022-08-03 PROCEDURE — 82947 ASSAY GLUCOSE BLOOD QUANT: CPT

## 2022-08-03 PROCEDURE — 96372 THER/PROPH/DIAG INJ SC/IM: CPT

## 2022-08-03 PROCEDURE — 85025 COMPLETE CBC W/AUTO DIFF WBC: CPT

## 2022-08-03 PROCEDURE — 6370000000 HC RX 637 (ALT 250 FOR IP): Performed by: INTERNAL MEDICINE

## 2022-08-03 PROCEDURE — 99222 1ST HOSP IP/OBS MODERATE 55: CPT | Performed by: PSYCHIATRY & NEUROLOGY

## 2022-08-03 PROCEDURE — 97530 THERAPEUTIC ACTIVITIES: CPT

## 2022-08-03 PROCEDURE — 86038 ANTINUCLEAR ANTIBODIES: CPT

## 2022-08-03 PROCEDURE — 6370000000 HC RX 637 (ALT 250 FOR IP): Performed by: FAMILY MEDICINE

## 2022-08-03 PROCEDURE — 84443 ASSAY THYROID STIM HORMONE: CPT

## 2022-08-03 PROCEDURE — 82746 ASSAY OF FOLIC ACID SERUM: CPT

## 2022-08-03 PROCEDURE — 86225 DNA ANTIBODY NATIVE: CPT

## 2022-08-03 PROCEDURE — 6360000002 HC RX W HCPCS: Performed by: INTERNAL MEDICINE

## 2022-08-03 PROCEDURE — 2500000003 HC RX 250 WO HCPCS: Performed by: INTERNAL MEDICINE

## 2022-08-03 PROCEDURE — 96375 TX/PRO/DX INJ NEW DRUG ADDON: CPT

## 2022-08-03 PROCEDURE — 97166 OT EVAL MOD COMPLEX 45 MIN: CPT

## 2022-08-03 PROCEDURE — 80048 BASIC METABOLIC PNL TOTAL CA: CPT

## 2022-08-03 PROCEDURE — 70551 MRI BRAIN STEM W/O DYE: CPT

## 2022-08-03 PROCEDURE — 95816 EEG AWAKE AND DROWSY: CPT

## 2022-08-03 PROCEDURE — 83735 ASSAY OF MAGNESIUM: CPT

## 2022-08-03 PROCEDURE — 99219 PR INITIAL OBSERVATION CARE/DAY 50 MINUTES: CPT | Performed by: INTERNAL MEDICINE

## 2022-08-03 PROCEDURE — 99222 1ST HOSP IP/OBS MODERATE 55: CPT | Performed by: INTERNAL MEDICINE

## 2022-08-03 PROCEDURE — 51798 US URINE CAPACITY MEASURE: CPT

## 2022-08-03 PROCEDURE — 82607 VITAMIN B-12: CPT

## 2022-08-03 PROCEDURE — 85652 RBC SED RATE AUTOMATED: CPT

## 2022-08-03 PROCEDURE — 2580000003 HC RX 258: Performed by: NURSE PRACTITIONER

## 2022-08-03 PROCEDURE — 82977 ASSAY OF GGT: CPT

## 2022-08-03 RX ORDER — TRAZODONE HYDROCHLORIDE 50 MG/1
50 TABLET ORAL NIGHTLY
Status: ON HOLD | COMMUNITY
End: 2022-08-09 | Stop reason: HOSPADM

## 2022-08-03 RX ORDER — DULOXETIN HYDROCHLORIDE 30 MG/1
30 CAPSULE, DELAYED RELEASE ORAL DAILY
Status: ON HOLD | COMMUNITY
End: 2022-08-03

## 2022-08-03 RX ORDER — FUROSEMIDE 80 MG
80 TABLET ORAL 2 TIMES DAILY
COMMUNITY
End: 2022-09-08

## 2022-08-03 RX ORDER — TIZANIDINE 4 MG/1
TABLET ORAL EVERY 6 HOURS PRN
Status: ON HOLD | COMMUNITY
End: 2022-08-09 | Stop reason: HOSPADM

## 2022-08-03 RX ORDER — TRAZODONE HYDROCHLORIDE 50 MG/1
50 TABLET ORAL NIGHTLY
Status: DISCONTINUED | OUTPATIENT
Start: 2022-08-03 | End: 2022-08-09 | Stop reason: HOSPADM

## 2022-08-03 RX ORDER — DULOXETIN HYDROCHLORIDE 30 MG/1
30 CAPSULE, DELAYED RELEASE ORAL DAILY
Status: ON HOLD | COMMUNITY
End: 2022-08-09 | Stop reason: HOSPADM

## 2022-08-03 RX ORDER — SODIUM BICARBONATE 650 MG/1
650 TABLET ORAL 3 TIMES DAILY
Status: DISCONTINUED | OUTPATIENT
Start: 2022-08-03 | End: 2022-08-09 | Stop reason: HOSPADM

## 2022-08-03 RX ORDER — CARVEDILOL 3.12 MG/1
3.12 TABLET ORAL 2 TIMES DAILY WITH MEALS
COMMUNITY
End: 2022-09-08

## 2022-08-03 RX ORDER — FUROSEMIDE 40 MG/1
80 TABLET ORAL 2 TIMES DAILY
Status: DISCONTINUED | OUTPATIENT
Start: 2022-08-03 | End: 2022-08-09 | Stop reason: HOSPADM

## 2022-08-03 RX ORDER — DEXTROSE MONOHYDRATE 100 MG/ML
INJECTION, SOLUTION INTRAVENOUS CONTINUOUS PRN
Status: DISCONTINUED | OUTPATIENT
Start: 2022-08-03 | End: 2022-08-09 | Stop reason: HOSPADM

## 2022-08-03 RX ORDER — ATORVASTATIN CALCIUM 10 MG/1
10 TABLET, FILM COATED ORAL DAILY
Status: DISCONTINUED | OUTPATIENT
Start: 2022-08-03 | End: 2022-08-09 | Stop reason: HOSPADM

## 2022-08-03 RX ORDER — LANOLIN ALCOHOL/MO/W.PET/CERES
10 CREAM (GRAM) TOPICAL NIGHTLY
COMMUNITY
End: 2022-09-08

## 2022-08-03 RX ORDER — SODIUM BICARBONATE 650 MG/1
650 TABLET ORAL 3 TIMES DAILY
COMMUNITY
End: 2022-09-08

## 2022-08-03 RX ORDER — FEBUXOSTAT 40 MG/1
40 TABLET, FILM COATED ORAL DAILY
COMMUNITY
End: 2022-09-08 | Stop reason: ALTCHOICE

## 2022-08-03 RX ORDER — TAMSULOSIN HYDROCHLORIDE 0.4 MG/1
0.4 CAPSULE ORAL DAILY
Status: DISCONTINUED | OUTPATIENT
Start: 2022-08-03 | End: 2022-08-09 | Stop reason: HOSPADM

## 2022-08-03 RX ORDER — LIDOCAINE AND PRILOCAINE 25; 25 MG/G; MG/G
CREAM TOPICAL PRN
Status: ON HOLD | COMMUNITY
End: 2022-08-09 | Stop reason: HOSPADM

## 2022-08-03 RX ORDER — PANTOPRAZOLE SODIUM 40 MG/1
40 TABLET, DELAYED RELEASE ORAL DAILY
COMMUNITY
End: 2022-09-08

## 2022-08-03 RX ORDER — FEBUXOSTAT 40 MG/1
40 TABLET, FILM COATED ORAL DAILY
Status: ON HOLD | COMMUNITY
End: 2022-08-03

## 2022-08-03 RX ORDER — INSULIN GLARGINE 100 [IU]/ML
6 INJECTION, SOLUTION SUBCUTANEOUS ONCE
Status: COMPLETED | OUTPATIENT
Start: 2022-08-03 | End: 2022-08-03

## 2022-08-03 RX ORDER — BUSPIRONE HYDROCHLORIDE 10 MG/1
10 TABLET ORAL 3 TIMES DAILY
Status: DISCONTINUED | OUTPATIENT
Start: 2022-08-03 | End: 2022-08-09 | Stop reason: HOSPADM

## 2022-08-03 RX ORDER — ASPIRIN 81 MG/1
81 TABLET, CHEWABLE ORAL DAILY
COMMUNITY
End: 2022-09-08

## 2022-08-03 RX ORDER — GABAPENTIN 300 MG/1
300 CAPSULE ORAL DAILY
COMMUNITY

## 2022-08-03 RX ORDER — INSULIN LISPRO 100 [IU]/ML
0-4 INJECTION, SOLUTION INTRAVENOUS; SUBCUTANEOUS
Status: DISCONTINUED | OUTPATIENT
Start: 2022-08-03 | End: 2022-08-09 | Stop reason: HOSPADM

## 2022-08-03 RX ORDER — INSULIN LISPRO 100 [IU]/ML
0-4 INJECTION, SOLUTION INTRAVENOUS; SUBCUTANEOUS NIGHTLY
Status: DISCONTINUED | OUTPATIENT
Start: 2022-08-03 | End: 2022-08-09 | Stop reason: HOSPADM

## 2022-08-03 RX ORDER — HEPARIN SODIUM 5000 [USP'U]/ML
5000 INJECTION, SOLUTION INTRAVENOUS; SUBCUTANEOUS EVERY 8 HOURS SCHEDULED
Status: DISCONTINUED | OUTPATIENT
Start: 2022-08-03 | End: 2022-08-09 | Stop reason: HOSPADM

## 2022-08-03 RX ORDER — MIRTAZAPINE 15 MG/1
TABLET, FILM COATED ORAL NIGHTLY
Status: ON HOLD | COMMUNITY
End: 2022-08-09 | Stop reason: HOSPADM

## 2022-08-03 RX ORDER — ASPIRIN 81 MG/1
81 TABLET ORAL DAILY
Status: DISCONTINUED | OUTPATIENT
Start: 2022-08-03 | End: 2022-08-09 | Stop reason: HOSPADM

## 2022-08-03 RX ORDER — CARVEDILOL 3.12 MG/1
3.12 TABLET ORAL 2 TIMES DAILY WITH MEALS
Status: DISCONTINUED | OUTPATIENT
Start: 2022-08-03 | End: 2022-08-09 | Stop reason: HOSPADM

## 2022-08-03 RX ORDER — DOCUSATE SODIUM 100 MG/1
100 CAPSULE, LIQUID FILLED ORAL 2 TIMES DAILY
COMMUNITY
End: 2022-09-08

## 2022-08-03 RX ORDER — BACLOFEN 5 MG/1
2.5 TABLET ORAL 2 TIMES DAILY PRN
Status: ON HOLD | COMMUNITY
End: 2022-08-09 | Stop reason: HOSPADM

## 2022-08-03 RX ORDER — LOPERAMIDE HYDROCHLORIDE 2 MG/1
2 CAPSULE ORAL 2 TIMES DAILY PRN
COMMUNITY

## 2022-08-03 RX ORDER — ACETAMINOPHEN 325 MG/1
650 TABLET ORAL EVERY 6 HOURS PRN
COMMUNITY

## 2022-08-03 RX ORDER — ATORVASTATIN CALCIUM 80 MG/1
80 TABLET, FILM COATED ORAL DAILY
Status: ON HOLD | COMMUNITY
End: 2022-08-09 | Stop reason: HOSPADM

## 2022-08-03 RX ORDER — DULOXETIN HYDROCHLORIDE 30 MG/1
30 CAPSULE, DELAYED RELEASE ORAL DAILY
Status: DISCONTINUED | OUTPATIENT
Start: 2022-08-03 | End: 2022-08-03

## 2022-08-03 RX ORDER — RANOLAZINE 500 MG/1
1000 TABLET, EXTENDED RELEASE ORAL 2 TIMES DAILY
COMMUNITY
End: 2022-09-08

## 2022-08-03 RX ORDER — FEBUXOSTAT 40 MG/1
40 TABLET, FILM COATED ORAL DAILY
Status: DISCONTINUED | OUTPATIENT
Start: 2022-08-03 | End: 2022-08-03

## 2022-08-03 RX ORDER — TAMSULOSIN HYDROCHLORIDE 0.4 MG/1
0.4 CAPSULE ORAL DAILY
COMMUNITY
End: 2022-09-08

## 2022-08-03 RX ORDER — INSULIN GLARGINE 100 [IU]/ML
50 INJECTION, SOLUTION SUBCUTANEOUS 2 TIMES DAILY
COMMUNITY
End: 2022-09-08

## 2022-08-03 RX ORDER — BUSPIRONE HYDROCHLORIDE 10 MG/1
15 TABLET ORAL 2 TIMES DAILY
Status: ON HOLD | COMMUNITY
End: 2022-08-09 | Stop reason: HOSPADM

## 2022-08-03 RX ADMIN — SODIUM CHLORIDE, PRESERVATIVE FREE 10 ML: 5 INJECTION INTRAVENOUS at 08:47

## 2022-08-03 RX ADMIN — TAMSULOSIN HYDROCHLORIDE 0.4 MG: 0.4 CAPSULE ORAL at 21:34

## 2022-08-03 RX ADMIN — SODIUM BICARBONATE 650 MG: 648 TABLET ORAL at 21:34

## 2022-08-03 RX ADMIN — ASPIRIN 81 MG: 81 TABLET, COATED ORAL at 21:34

## 2022-08-03 RX ADMIN — INSULIN LISPRO 1 UNITS: 100 INJECTION, SOLUTION INTRAVENOUS; SUBCUTANEOUS at 08:42

## 2022-08-03 RX ADMIN — SODIUM CHLORIDE, PRESERVATIVE FREE 10 ML: 5 INJECTION INTRAVENOUS at 01:10

## 2022-08-03 RX ADMIN — ACETAMINOPHEN 650 MG: 325 TABLET ORAL at 15:51

## 2022-08-03 RX ADMIN — CEFTRIAXONE SODIUM 1000 MG: 10 INJECTION, POWDER, FOR SOLUTION INTRAVENOUS at 02:40

## 2022-08-03 RX ADMIN — SODIUM CHLORIDE, PRESERVATIVE FREE 10 ML: 5 INJECTION INTRAVENOUS at 21:34

## 2022-08-03 RX ADMIN — ATORVASTATIN CALCIUM 10 MG: 10 TABLET, FILM COATED ORAL at 21:34

## 2022-08-03 RX ADMIN — INSULIN GLARGINE 6 UNITS: 100 INJECTION, SOLUTION SUBCUTANEOUS at 08:43

## 2022-08-03 RX ADMIN — ACETAMINOPHEN 650 MG: 325 TABLET ORAL at 02:51

## 2022-08-03 RX ADMIN — TRAZODONE HYDROCHLORIDE 50 MG: 50 TABLET ORAL at 21:34

## 2022-08-03 RX ADMIN — HEPARIN SODIUM 5000 UNITS: 5000 INJECTION INTRAVENOUS; SUBCUTANEOUS at 15:44

## 2022-08-03 RX ADMIN — INSULIN LISPRO 3 UNITS: 100 INJECTION, SOLUTION INTRAVENOUS; SUBCUTANEOUS at 17:47

## 2022-08-03 RX ADMIN — BUSPIRONE HYDROCHLORIDE 10 MG: 10 TABLET ORAL at 21:34

## 2022-08-03 RX ADMIN — HEPARIN SODIUM 5000 UNITS: 5000 INJECTION INTRAVENOUS; SUBCUTANEOUS at 21:34

## 2022-08-03 RX ADMIN — INSULIN LISPRO 2 UNITS: 100 INJECTION, SOLUTION INTRAVENOUS; SUBCUTANEOUS at 12:46

## 2022-08-03 ASSESSMENT — PAIN DESCRIPTION - DESCRIPTORS
DESCRIPTORS: NUMBNESS;TINGLING
DESCRIPTORS: ACHING;BURNING

## 2022-08-03 ASSESSMENT — PAIN DESCRIPTION - ORIENTATION
ORIENTATION: RIGHT;LEFT;OTHER (COMMENT)
ORIENTATION: LEFT;RIGHT

## 2022-08-03 ASSESSMENT — ENCOUNTER SYMPTOMS
SHORTNESS OF BREATH: 0
DIARRHEA: 0
VOMITING: 0
SORE THROAT: 0
NAUSEA: 0
RHINORRHEA: 0
ABDOMINAL PAIN: 0
COUGH: 0

## 2022-08-03 ASSESSMENT — PAIN DESCRIPTION - LOCATION
LOCATION: HAND;FOOT
LOCATION: FOOT;HAND

## 2022-08-03 ASSESSMENT — PAIN - FUNCTIONAL ASSESSMENT: PAIN_FUNCTIONAL_ASSESSMENT: ACTIVITIES ARE NOT PREVENTED

## 2022-08-03 ASSESSMENT — PAIN SCALES - GENERAL
PAINLEVEL_OUTOF10: 7
PAINLEVEL_OUTOF10: 3

## 2022-08-03 NOTE — CARE COORDINATION
08/03/22 1432   Service Assessment   Patient Orientation   (memory issues)   Cognition Short Term Memory Deficit   History Provided By Other (see comment)  (sister Earnest Cortes)   Primary Caregiver Self   Accompanied By/Relationship lives with her mom   Support Systems Parent; Family Members   Patient's [address] is:   (sister Jamilah Tavarez)   PCP Verified by CM Yes  (Christine Sheldon)   Last Visit to PCP Within last 3 months   Prior Functional Level Bathing;Cooking;Housework; Shopping;Mobility   Can patient return to prior living arrangement Yes   Ability to make needs known: Other (see comment)  (sister is helping)   Financial Resources Medicare  (paperwork started for [facility] Medicaid  specialized recovery services)   Social/Functional History   Lives With Alone   Type of [address] One level   Bathroom Shower/Tub Walk-in shower; Shower chair with back   [employer] in [address];Sock aid; [place] [address]uel[place], 4 wheeled; Walker, standard; Wheelchair-manual;Hospital bed   Receives Help From Family   ADL Assistance Needs assistance   [address] Needs assistance   Ambulation Assistance Independent   Transfer Assistance Independent   Active  No   Occupation On disability   Discharge Planning   Living Arrangements Alone   Current Services Prior To Admission Home Care  (hadn't started)   Potential Assistance Purchasing Medications No   Meds-to-Beds: Does the patient want to have any new prescriptions delivered to bedside prior to discharge? Yes   Follow Up Appointment: Best Day/Time    (work around dialysis)   One/Two Story Residence One story   History of falls? [address] Provided? No   Mode of Transport at Discharge   (family)   Condition of Participation: Discharge Planning   The Plan for Transition of Care is related to the following treatment goals:  To get stronger and self sufficient   Goal is to return home with [place]. Unable to determine if pt will be safe returning home at this time. Agreeable to SNF if needed  #1 [facility], #2 [facility]. If discharged to home, family will transport. Confirmed with Oralia at [facility] that pt is current. Can accept back if pt able to return home. Referral sent. Referrals sent to [address] [place] [address] and [facility] Justin Maradiaga.   HC VS SNF

## 2022-08-03 NOTE — H&P
no abnormal sensation, normal speech, cranial nerves II through XII grossly intact  Skin: No gross lesions, rashes, bruising or bleeding on exposed skin area  Extremities: peripheral pulses palpable, no pedal edema or calf pain with palpation  Psych: tearful, worried     Investigations:      Laboratory Testing:  Recent Results (from the past 24 hour(s))   CBC with Auto Differential    Collection Time: 08/02/22  5:13 PM   Result Value Ref Range    WBC 6.8 3.5 - 11.3 k/uL    RBC 3.05 (L) 3.95 - 5.11 m/uL    Hemoglobin 9.9 (L) 11.9 - 15.1 g/dL    Hematocrit 30.2 (L) 36.3 - 47.1 %    MCV 99.0 82.6 - 102.9 fL    MCH 32.5 25.2 - 33.5 pg    MCHC 32.8 28.4 - 34.8 g/dL    RDW 17.3 (H) 11.8 - 14.4 %    Platelets 991 563 - 053 k/uL    MPV 10.0 8.1 - 13.5 fL    NRBC Automated 0.0 0.0 per 100 WBC    Seg Neutrophils 74 (H) 36 - 65 %    Lymphocytes 13 (L) 24 - 43 %    Monocytes 8 3 - 12 %    Eosinophils % 3 1 - 4 %    Basophils 1 0 - 2 %    Immature Granulocytes 1 (H) 0 %    Segs Absolute 5.01 1.50 - 8.10 k/uL    Absolute Lymph # 0.91 (L) 1.10 - 3.70 k/uL    Absolute Mono # 0.57 0.10 - 1.20 k/uL    Absolute Eos # 0.18 0.00 - 0.44 k/uL    Basophils Absolute 0.04 0.00 - 0.20 k/uL    Absolute Immature Granulocyte 0.09 0.00 - 0.30 k/uL    RBC Morphology ANISOCYTOSIS PRESENT    Comprehensive Metabolic Panel    Collection Time: 08/02/22  5:13 PM   Result Value Ref Range    Glucose 229 (H) 70 - 99 mg/dL    BUN 13 8 - 23 mg/dL    Creatinine 1.60 (H) 0.50 - 0.90 mg/dL    Calcium 9.4 8.6 - 10.4 mg/dL    Sodium 135 135 - 144 mmol/L    Potassium 3.8 3.7 - 5.3 mmol/L    Chloride 96 (L) 98 - 107 mmol/L    CO2 29 20 - 31 mmol/L    Anion Gap 10 9 - 17 mmol/L    Alkaline Phosphatase 138 (H) 35 - 104 U/L    ALT 14 5 - 33 U/L    AST 20 <32 U/L    Total Bilirubin 0.48 0.3 - 1.2 mg/dL    Total Protein 7.4 6.4 - 8.3 g/dL    Albumin 3.6 3.5 - 5.2 g/dL    Albumin/Globulin Ratio 0.9 (L) 1.0 - 2.5   Ammonia    Collection Time: 08/02/22  5:13 PM   Result Value Ref Range    Ammonia 14 11 - 51 umol/L   Troponin    Collection Time: 08/02/22  5:13 PM   Result Value Ref Range    Troponin, High Sensitivity 101 (HH) 0 - 14 ng/L   Troponin    Collection Time: 08/02/22  8:03 PM   Result Value Ref Range    Troponin, High Sensitivity 90 (HH) 0 - 14 ng/L   Urinalysis with Reflex to Culture    Collection Time: 08/02/22  9:46 PM    Specimen: Urine   Result Value Ref Range    Color, UA Dark Yellow (A) Yellow    Turbidity UA Turbid (A) Clear    Glucose, Ur NEGATIVE NEGATIVE    Bilirubin Urine NEGATIVE NEGATIVE    Ketones, Urine NEGATIVE NEGATIVE    Specific Gravity, UA 1.016 1.005 - 1.030    Urine Hgb TRACE (A) NEGATIVE    pH, UA 6.5 5.0 - 8.0    Protein, UA 2+ (A) NEGATIVE    Urobilinogen, Urine Normal Normal    Nitrite, Urine NEGATIVE NEGATIVE    Leukocyte Esterase, Urine LARGE (A) NEGATIVE   Microscopic Urinalysis    Collection Time: 08/02/22  9:46 PM   Result Value Ref Range    -          WBC, UA TOO NUMEROUS TO COUNT 0 - 5 /HPF    RBC, UA 2 TO 5 0 - 4 /HPF    Casts UA  0 - 8 /LPF     2 TO 5 HYALINE Reference range defined for non-centrifuged specimen. Epithelial Cells UA None 0 - 5 /HPF    Bacteria, UA MANY (A) None       Imaging/Diagnostics:  CT HEAD WO CONTRAST    Result Date: 8/2/2022  No evidence for acute intracranial hemorrhage, territorial infarction or intracranial mass lesion. Mild chronic microangiopathic ischemic disease. Mild generalized volume loss. Assessment :      Hospital Problems             Last Modified POA    * (Principal) Altered mental status 8/2/2022 Yes     #Acute confusion with global amnesia  -Patient is aware that she is confused and is tearful  -Similar situation occurred in past and reported days to weeks to resolve.    -BP in acceptable range, BUN and ammonia level unrevealing, glucose 229,   -CT head: No evidence for acute intracranial hemorrhage, territorial infarction or intracranial mass lesion.  -U tox positive, urine culture pending  PLAN  -Obtain TSH, empiric antibiotics and follow up on cultures, ESR/CRP []  -Its possible her BP has been elevated for so long her MAP is set higher requiring higher BP to achieve clinical perfusion. Will need to find out her baseline BP. []  -Obtain echo [] to evaluate for possible Aortic stenosis which is worsened by dehydration/HD sessions leading to her presentation.   -esr/crp, procalcitonin and u tox []  -obtain ABG, possible co2 retention []        #ESRD on HD  -stable without indication for emergent HD. Near baseline. Last HD was earlier on admission with ~90% completion   -Cr on admission 1.6, bun 13, bicarb 29  -Nephro consult, resume home meds        #DM  -Glucose on admission 200s. No DKA /HHS state. Lactic unrevealing.   -Home meds pending upload due to chart merge  PLAN  -Sliding scale, DM diet, hypoglycemia protocol, glucose goal while inpatient 140-180    #Anemia of chronic inflammation  -Hgb 9/9, MCV 99, , nml liver panel, not on AC. No signs of active bleeding  -Appears to be at baseline. Underlying ESRD.

## 2022-08-03 NOTE — PROCEDURES
[facility]                          ELECTROENCEPHALOGRAM REPORT    PATIENT NAME: Anna Quintero                      :        1960  MED REC NO:   [id number]                             ROOM:       8647  ACCOUNT NO:   [de-identified]                           ADMIT DATE: 2022  PROVIDER:     Tr Slater MD    DATE OF EE2022    ATTENDING OF RECORD:  Claudean Millard, MD    REASON FOR STUDY:  This is a 49-year-old lady with end-stage renal  disease with episode of unresponsiveness and hemodialysis with  confusion, amnesia. MEDICATIONS:  Include insulin, Lipitor, BuSpar, Coreg, Lasix, Flomax,  Desyrel. EEG FINDINGS:  This is an 18-channel EEG and one EKG channel recording  performed on a patient described to be awake, drowsy and asleep. The  patient shows normal waking rhythms. Background activity consists of  well-regulated 9 Hz activity in a 40-60 microvolt range, more prominent  in the posterior head area, showing some reactivity to eye opening and  closing. Over the anterior head regions, there are 15-20 Hz activity in  20-30 microvolt range. The patient does have mild intermittent  generalized 4-7 Hz activity admixed with slow sharp wave discharge. With drowsiness, further intrusion of theta band. Hyperventilation is  not performed. Photic stimulation shows no change of the record. IMPRESSION:  This EEG shows the presence of mild intermittent  generalized slowing with slow sharp wave activity. This is likely more  concordant with mild underlying encephalopathy. No clear focal or  epileptiform disturbance is seen.         Arnold Lawler MD    D: 2022 18:43:12       T: 2022 18:46:42     [email or web address]  Job#: [id number]     Doc#: [id number]    CC:

## 2022-08-03 NOTE — CONSULTS
[id number] [facility] Neurology   IN-PATIENT SERVICE    NEUROLOGY CONSULT  NOTE            Date:   8/3/2022  Patient name:  Shakila Gunn  Date of admission:  8/2/2022  YOB: 1960      Chief Complaint:     Chief Complaint   Patient presents with    Altered Mental Status       Reason for Consult:      AMS    History of Present Illness: The patient is a 57 y.o. female admitted on 8/2/2022 as a case of AMS. The patient presented with acute confusion during dialysis session. This also occurred multiple times, according to sister this is the 10th time. Frequently the patient will have prolonged bouts of confusion and total amnesia where she will forget her name, her family members, and will not recognize her home or aspects of her childhood. The longest stretch of alteration was about 1 to 2 months. According to the patient's family she has been evaluated by multiple neurologist and he has had extensive head images with CTs and MRIs, and EEGs in the past.  The most recent neurological work-up was about 1 month ago, MRI and MRA were negative for any acute vascular insult. EEG done during the admission showed mild nonspecific encephalopathy. She also had an MRI 2 months prior to that which was also grossly unremarkable. During exam patient was oriented only to self. There is generalized weakness but patient was slightly weaker on the right side. Numbness of right face arm and leg. Both patient and sisters confirm that this has been a chronic issue and that she has no new neurological deficits. She also has moderate diabetic neuropathy with decreased sensation in a stocking and glove distribution. CT head done in the ED did not show any acute abnormality. Patient also has a history of chronic and recurrent UTIs. She was supposed to see a urologist this week for assessment and further management.   She does have evidence of UTI in the ED which may be contributing to acute change in mental status. 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 Dialysis regiment has been altered multiple times in the past.  It appears that each time patient has an acute change in mental status, it occurs during a dialysis session with slow improvement following. Past Medical History:     No past medical history on file. Past Surgical History:     No past surgical history on file. Medications Prior to Admission:     Prior to Admission medications    Not on File        Allergies:     Patient has no allergy information on record. Social History:     Tobacco:    has no history on file for tobacco use. Alcohol:      has no history on file for alcohol use. Drug Use:  has no history on file for drug use. Family History:     No family history on file. Review of Systems:       Constitutional Negative for fever and chills   HEENT Negative for ear discharge, ear pain, nosebleed   Eyes Negative for photophobia, pain and discharge   Respiratory Negative for hemoptysis and sputum   Cardiovascular Negative for orthopnea, claudication and PND   Gastrointestinal Negative for abdominal pain, diarrhea, blood in stool   Musculoskeletal Negative for joint pain, negative for myalgia   Skin Negative for rash or itching   hematology Negative for ecchymosis, anemia   Psychiatric Negative for suicidal ideation, anxiety, depression, hallucinations       Physical Exam:   BP (!) 133/59   Pulse 79   Temp 98.1 °F (36.7 °C) (Oral)   Resp 16   Wt 225 lb 12 oz (102.4 kg)   SpO2 93%   Temp (24hrs), Av °F (36.7 °C), Min:97.9 °F (36.6 °C), Max:98.1 °F (36.7 °C)         General Examination    General Resting comfortably in bed   Head Normocephalic, without obvious abnormality   Neck Supple, symmetrical. Good ROM. No midline or paraspinal tenderness. Lungs Respirations unlabored, no wheezing   Chest Wall No deformity   Heart RRR, no murmur   Abdomen Soft.  Non-tender, non-distended   Extremities No cyanosis or edema or warmth. Pulses 2+ and symmetric   Skin: Skin  turgor normal, no rashes or lesions       Neurological examination:      Mental status   Oriented only to self; following all commands;   speech is fluent, no dysarthria, aphasia. Cranial nerves   II - visual fields intact to confrontation; pupils reactive  III, IV, VI - extraocular muscles intact; no CECELIA; no nystagmus; no ptosis   V - normal facial sensation                                                               VII - normal facial symmetry                                                             VIII - intact hearing                                                                             IX, X - symmetrical palate elevation                                               XI - symmetrical shoulder shrug                                                       XII - midline tongue without atrophy or fasciculation     Motor function  Strength:   4/5 RUE, 4/5 RLE  4+/5 LUE, 4+/5  LLE  Normal bulk and tone. Sensory function Decreased light touch and pinprick sensation over the right face, right upper extremity, right lower extremity  Decreased sensation to light touch and pinprick up to mid shin bilaterally and in both hands bilaterally     Cerebellar Intact finger-nose-finger testing. Intact heel-shin testing. No dysdiadochokinesia present. No tremors                        Reflex function 2/4 symmetric throughout . Downgoing plantar response bilaterally.  (-)Meza's sign bilaterally      Gait                  Not test           Diagnostics:      Laboratory Testing:  CBC:   Recent Labs     08/02/22  1713   WBC 6.8   HGB 9.9*        BMP:    Recent Labs     08/02/22  1713      K 3.8   CL 96*   CO2 29   BUN 13   CREATININE 1.60*   GLUCOSE 229*         Lab Results   Component Value Date    ALT 14 08/02/2022    AST 20 08/02/2022       No results found for: PHENYTOIN, PHENYTOIN, VALPROATE, CBMZ      Imaging/Diagnostics:  CT HEAD WO CONTRAST    Result Date: 8/2/2022  EXAMINATION: CT OF THE HEAD WITHOUT CONTRAST  8/2/2022 5:03 pm TECHNIQUE: CT of the head was performed without the administration of intravenous contrast. Automated exposure control, iterative reconstruction, and/or weight based adjustment of the mA/kV was utilized to reduce the radiation dose to as low as reasonably achievable. COMPARISON: None. HISTORY: ORDERING SYSTEM PROVIDED HISTORY: AMS after dialysis TECHNOLOGIST PROVIDED HISTORY: AMS after dialysis Decision Support Exception - unselect if not a suspected or confirmed emergency medical condition->Emergency Medical Condition (MA) Reason for Exam: AMS after dialysis FINDINGS: There is no acute infarction, intracranial hemorrhage or intracranial mass lesion. No mass effect, midline shift or extra-axial collection is noted. There are mild nonspecific foci of periventricular and subcortical cerebral white matter hypodensity, most likely representing chronic microangiopathic disease in this age group. The brain parenchyma is otherwise normal. The cerebellar tonsils are in normal position. The ventricles, sulci, and cisterns are mildly prominent suggestive of mild generalized volume loss. The globes and orbits are within normal limits. The visualized extracranial structures including paranasal sinuses and mastoid air cells are unremarkable. No fracture is identified. Extensive scalp vascular calcification. No evidence for acute intracranial hemorrhage, territorial infarction or intracranial mass lesion. Mild chronic microangiopathic ischemic disease. Mild generalized volume loss. Assessment:      57year old female patient. Presented on 8/2/2022 as a case of altered mental status. Neurology were consulted for acute altered mental status that occurred during dialysis.   Patient has had frequent bouts of AMS that started while she is being dialyzed and she has a slow return to baseline that spans several weeks to months. AMS can also be attributable to metabolic encephalopathy secondary to dialysis disequilibrium syndrome, complicated by chronic recurrent UTI. Neurological work-up in the past has been negative during similar presentations. Plan:        Patient may benefit from repeat MRI of the brain. CT head similar to previous. Will defer decision to the day neurology team  Full neurological assessment following resolution of UTI may present a more clear picture of current deficits          Thank you for your consultation.      Electronically signed by Jaycob Murillo MD on 8/3/2022 at 2:36 AM      Electronically signed by   Jaycob Murillo MD  8/3/2022  2:36 AM

## 2022-08-03 NOTE — PROGRESS NOTES
Occupational Therapy  Facility/Department: [facility] ONC/MED SURG  Occupational Therapy Initial Assessment    Name: Jenny Golden  : 1960  MRN: [id number]  Date of Service: 8/3/2022  Chief Complaint   Patient presents with    Altered Mental Status       Discharge Recommendations: Pt would not be safe to return \"home\" without 24 hr supervision as pt with cognitive deficits. Patient would benefit from continued therapy after discharge  OT Equipment Recommendations  Other: PINKY as pt could not confirm current DME/AE at home       Patient Diagnosis(es): The encounter diagnosis was Disorientation. Past Medical History:  has no past medical history on file. Past Surgical History:  has no past surgical history on file. Assessment   Performance deficits / Impairments: Decreased safe awareness;Decreased cognition;Decreased endurance  Prognosis: Good  Decision Making: Medium Complexity  REQUIRES OT FOLLOW-UP: Yes  Activity Tolerance  Activity Tolerance: Patient Tolerated treatment well;Treatment limited secondary to decreased cognition        Plan   Plan  Times per Week: 3-5x     Restrictions  Restrictions/Precautions  Restrictions/Precautions: General Precautions  Required Braces or Orthoses?: No  Position Activity Restriction  Other position/activity restrictions: up with A    Subjective   General  Patient assessed for rehabilitation services?: Yes  Family / Caregiver Present: No  Diagnosis: AMS, confusion during HD, R numbness, UTI  Subjective  Subjective: 0/10 pain level per pt     Social/Functional History  Social/Functional History  Home Equipment: Sock aid, Reacher (Pt remembered having LB ADL equipment yet did not recall walker or cane use)  Active :  (Pt could not recall driving or not)  Additional Comments: Pt is a poor historian this date       Objective   SpO2: 93 %  O2 Device: None (Room air)             Safety Devices  Type of Devices: Patient at risk for falls; Left in chair;Call light within reach;Chair alarm in place;Nurse notified;Gait belt  Restraints  Restraints Initially in Place: No  Bed Mobility Training  Bed Mobility Training: No  Supine to Sit: Other (comment) (PINKY as pt sitting on toilet upon arrival and retiring sitting in recliner with alarm on at writer's exit)  Scooting: Modified independent  Balance  Sitting: Intact  Standing: Intact (SUP only, RW used, stood for total of ~4 min this date)  Transfer Training  Transfer Training: Yes  Sit to Stand: Supervision  Stand to Sit: Supervision (Pt sat in recliner slowly and controlled)  Toilet Transfer: Supervision  Gait  Overall Level of Assistance: Supervision (RW used, pt unsure of DME at home, able to manage RW properly with min cues when turning, etc.)  Speed/Annie: Slow  Assistive Device: Walker, rolling (Will attempt without RW next tx session)     AROM: Within functional limits  PROM: Within functional limits  Strength:  Within functional limits  Coordination: Within functional limits  Tone: Normal  Sensation: Impaired (N/T in B/L hands/feet, pt states \"I can't remember if this is new or old\")  ADL  Feeding: Modified independent  (Before looking under lid, pt remembered what she had ordered for breakfast \"tuttle and english muffin\", pt was provided personal reading glasses to read cream cheese label, (I) buttered muffin and opened milk container easily)  Grooming: Stand by assistance (Pt was sitting attempted to urinate when writer placed a toothbrush/paste on sink next to pt, pt immediately began using toothbrush while sitting on toilet yet required setup of water cup and spit bucket to complete activity)  Grooming Skilled Clinical Factors: Pt turned the warm water on too fast when washing hands yet then appropriately turned the speed down and located the hand soap/paper towel with no vc's, pt should have waited to brush teeth until standing sinkside instead of performing it on toilet as pt could not effectively spit into sink, etc.  CHRISTIANO Bathing: Supervision  LE Bathing: Supervision  UE Dressing: Supervision  LE Dressing: Contact guard assistance (Pt able to doff/don R sock by bending down while sitting in recliner, pt required a rest break d/t notable SOB, pt stated during activity having a sock-aid and using it daily)  Toileting: Stand by assistance (Pt sitting on toilet upon arrival for ~8 min at start of session, unsuccessful urination despite pt turning water on to attempt to stimulate urine flow)              Vision  Vision: Impaired  Vision Exceptions: Wears glasses for reading  Hearing  Hearing: Within functional limits  Cognition  Overall Cognitive Status: Exceptions  Attention Span: Appears intact  Memory: Decreased short term memory;Decreased long term memory;Decreased recall of recent events  Insights: Decreased awareness of deficits  Initiation: Does not require cues  Sequencing: Does not require cues  Cognition Comment: Pt unable to recall her age or current location of hospital, pt could not recall most social hx, pt was able to turn on/off sink water while attempting to stimulate urination unsuccessfully, pt remembered sister's names \"Hailey and Gallegos Aida" and that they told her she was unmarried last night and that pt had ~10 of these episodes, pt recalled owning a sock-aid and reacher at home, muted tv appropriately and named of celebrity \"Sabrinasean RogersJensen\", pt remebered writer's name throughout session after being told it x2 and reminded writer x2 to bring t a peice of writing paper, pt made 2 appropriate jokes about \"bad\" commercials on tv, etc.  Orientation  Overall Orientation Status: Impaired  Orientation Level: Oriented to person;Disoriented to place; Disoriented to time;Disoriented to situation                  Education Given To: Patient  Education Provided: Role of Therapy;Plan of Care;Precautions  Education Method: Verbal  Barriers to Learning: Cognition  Education Outcome: Verbalized understanding;Continued education needed  DEANDRE AROM (degrees)  LUE AROM : WFL  RUE AROM (degrees)  RUE AROM : [facility]                         AM-PAC Score        AM-PAC Inpatient Daily Activity Raw Score: 19 (08/03/22 0922)  AM-PAC Inpatient ADL T-Scale Score : 40.22 (08/03/22 1693)  ADL Inpatient CMS 0-100% Score: 42.8 (08/03/22 0922)  ADL Inpatient CMS G-Code Modifier : CK (08/03/22 7559)         Goals  Short Term Goals  Time Frame for Short term goals: Pt will by discharge  Short Term Goal 1: demo ADL UB bathing/dressing activity at (I), including setup, no cues  Short Term Goal 2: demo ADL LB bathing/dressing activity at mod (I), using sock-aid/reacher PRN, including setup, no cues  Short Term Goal 3: demo good safety awareness during func mob around room at mod I using LRD PRN  Short Term Goal 4: demo accuracy of 8/10 during \"What's Wrong? \" cards to identify errors during daily tasks  Short Term Goal 5: demo standing during func activity for 10 min+ using LRD PRN and mod I  Short Term Goal 6: demo 6-step func activity with 1 vc only to address cognitive concerns for ADL performance       Therapy Time   Individual Concurrent Group Co-treatment   Time In 0808         Time Out 0840         Minutes 32         Timed Code Treatment Minutes: 26 Minutes       [facility], OTR/L

## 2022-08-03 NOTE — PROGRESS NOTES
Writer received fax from [facility] at [facility] of pt's discharge medications, updated pt's home med list and updated pharmacist Torin Barnard and Dr. Nichole Primrose.

## 2022-08-03 NOTE — FLOWSHEET NOTE
SPIRITUAL CARE DEPARTMENT - [address]  PROGRESS NOTE    Shift date: 8.2.2022  Shift day: Tuesday   Shift # 2    Room # 9961/8333-89   Name: Terri Lopez                Taoism: unknown   Place of Gnosticist: unknown    Referral: Routine Visit    Admit Date & Time: 8/2/2022  4:33 PM    Assessment:  Terri Lopez is a 57 y.o. female in the hospital. Upon entering the room writer observes the patient to be calm and coping. Intervention:  Writer introduced self and title as  Writer offered space for the patient  to express feelings, needs, and concerns and provided a ministry presence. Determined family support to be available. Outcome:  Calm and coping    Plan:  Chaplains will remain available to offer spiritual and emotional support as needed. Electronically signed by Anupama Puckett on 8/2/2022 at 11:00 PM.  [facility]  [phone]       08/02/22 1700   Encounter Summary   Service Provided For: Patient   Referral/Consult From: [facility] Family members   Last Encounter  08/02/22   Complexity of Encounter Moderate   Begin Time 1700   End Time  1730   Total Time Calculated 30 min   Encounter    Type Initial Screen/Assessment   Assessment/Intervention/Outcome   Assessment Calm;Coping   Intervention Active listening;Discussed illness injury and its impact; Explored/Affirmed feelings, thoughts, concerns   Outcome Engaged in conversation;Expressed feelings, needs, and concerns     Electronically signed by Amrita Nayak on 8/2/2022 at 11:00 PM

## 2022-08-03 NOTE — PROGRESS NOTES
testing; RN aware)  Restraints  Restraints Initially in Place: No     Restrictions  Restrictions/Precautions  Restrictions/Precautions: General Precautions  Required Braces or Orthoses?: No  Position Activity Restriction  Other position/activity restrictions: up with A     Subjective   General  Patient assessed for rehabilitation services?: Yes  Response To Previous Treatment: Not applicable  Family / Caregiver Present: No  Follows Commands: Within Functional Limits  Subjective  Subjective: RN and pt in agreement for PT eval; pt supine in bed upon PT arrival, pt pleasantly confused throughout session, however cooperative throughout         Social/Functional History  Social/Functional History  Lives With: Alone  Type of Home: [address] [facility] [place]: One level  Bathroom Shower/Tub: Walk-in shower, Shower chair with back  Bathroom Equipment: Grab bars in shower  Home Equipment: [address], Sock aid, Valentino Sings, 4 wheeled, Walker, standard, [address] 450 bed  [employer] Help From: Family  ADL Assistance: Needs assistance  Toileting: Independent  Homemaking Assistance: Needs assistance  Ambulation Assistance: Independent  Transfer Assistance: Independent  Active : No  Occupation: On disability  Additional Comments: Information above obtained from CM interview; pt unable to provide social functional hx at this time due to confusion  Vision/Hearing  Vision  Vision: Impaired  Vision Exceptions: Wears glasses for reading  Hearing  Hearing: Within functional limits    Cognition   Orientation  Overall Orientation Status: Impaired  Orientation Level: Oriented to person;Disoriented to place; Disoriented to time;Disoriented to situation  Cognition  Overall Cognitive Status: Exceptions  Attention Span: Appears intact  Memory: Decreased short term memory;Decreased long term memory;Decreased recall of recent events  Insights: Decreased awareness of deficits  Initiation: Does not require cues  Sequencing: Does not require cues  Cognition Comment: Pt demonstrates significant confusion throughout session, however, is able to acknowledge her awareness that is disoriented and has significant cognition deficits. Pt demonstrates increased cognitive recall with visual cueing, able to tell writer about previous mobility when visualizing RW and WC within hallway. Objective   Gross Assessment  Sensation: Intact     Joint Mobility  ROM RLE: WFL  ROM LLE: WFL  ROM RUE: WFL  ROM LUE: WFL  Strength RLE  Strength RLE: WFL  Strength LLE  Strength LLE: WFL  Strength RUE  Strength RUE: WFL  Strength LUE  Strength LUE: WFL        Bed Mobility Training  Bed Mobility Training: No  Supine to Sit: Other (comment) (PINKY as pt sitting on toilet upon arrival and retiring sitting in recliner with alarm on at writer's exit)  Scooting: Modified independent  Balance  Sitting: Intact  Standing: Intact (SUP only, RW used, stood for total of ~4 min this date)  Transfer Training  Transfer Training: Yes  Sit to Stand: Supervision  Stand to Sit: Supervision (Pt sat in recliner slowly and controlled)  Toilet Transfer: Supervision  Gait  Overall Level of Assistance: Supervision (RW used, pt unsure of DME at home, able to manage RW properly with min cues when turning, etc.)  Speed/Annie: Slow  Assistive Device: Walker, rolling (Will attempt without RW next tx session)  Bed mobility  Supine to Sit:  (Did not assess- pt seated in bedside chair upon writer's entrance)  Sit to Supine: Contact guard assistance (pt retired on transport stretcher upon writer's exit)  Transfers  Sit to Stand: Contact guard assistance  Stand to sit: Contact guard assistance  Ambulation  Surface: level tile  Device: Rolling Walker  Assistance: Contact guard assistance  Quality of Gait: Increased annie; verbal cueing required for safe gait speed  Distance: 15ft  Comments: Ambulation distance limited due to arrival of in hospital transport for testing. RN notified and aware.   More Ambulation?: No  Stairs/Curb  Stairs?: No     Balance  Posture: Fair  Sitting - Static: Fair;+  Sitting - Dynamic: Fair;+  Standing - Static: Fair;+  Standing - Dynamic: Fair;-  Comments: standing balance assessed w/ RW; pt able to sit unsupported CGA    AM-PAC Score  AM-PAC Inpatient Mobility Raw Score : 22 (08/03/22 1549)  AM-PAC Inpatient T-Scale Score : 53.28 (08/03/22 1549)  Mobility Inpatient CMS 0-100% Score: 20.91 (08/03/22 1549)  Mobility Inpatient CMS G-Code Modifier : CJ (08/03/22 1549)  Goals  Short Term Goals  Time Frame for Short term goals: 14  Short term goal 1: Pt to perform bed mobility and functional transfers independently  Short term goal 2: Pt to ambulate 300ft w/ RW independently  Short term goal 3: Demonstrate standing balance of good - to decrease fall risk  Short term goal 4: Tolerate 30 minutes of therapy to demo increased endurance  Patient Goals   Patient goals : To go stronger       Education  Patient Education  Education Given To: Patient  Education Provided: Role of Therapy;Plan of Care  Education Method: Demonstration  Barriers to Learning: None  Education Outcome: Verbalized understanding;Continued education needed; Unable to demonstrate understanding      Therapy Time   Individual Concurrent Group Co-treatment   Time In 1032         Time Out 1046         Minutes 14         Timed Code Treatment Minutes: 8 Minutes       Gerardo Avendano PT

## 2022-08-03 NOTE — ED NOTES
The following labs labeled with pt sticker and tubed to lab:     [] Blue     [] Lavender   [] on ice  [] Green/yellow  [] Green/black [] on ice  [] Yellow  [] Red  [] Pink      [] COVID-19 swab    [] Rapid  [] PCR  [] Flu swab  [] Peds Viral Panel     [x] Urine Sample  [] Pelvic Cultures  [] Blood Cultures            Andrew oRmero RN  08/02/22 7084

## 2022-08-03 NOTE — CONSULTS
Nephrology Consult Note    Reason for Consult: ESRD  Requesting Physician: Dr. Beverly Qiu    Chief Complaint: Dizziness and near syncope during dialysis  History Obtained From:  patient, electronic medical record    History of Present Illness: This is a 57 y.o. female with past medical history of type 2 diabetes, ESRD on hemodialysis, ERICK, chronic hypoxic respiratory failure who presents with dizziness and near syncope during dialysis. She is admits that this is happened before and took up to a month to feel back at baseline. On presentation she had global amnesia and was unable to remember her name, her vitals significant for low diastolic pressures in the 40s and 64L however systolic pressures 998U to 130s, glucose 275 on admission, ABG showed pH of 7.4, BMP showing results consistent with ESRD and no severe abnormalities. 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 She does make some urine and UA was positive for large leukocyte Estrace, 2+ protein and many bacteria, culture pending. CT head was negative for acute intracranial abnormality. On exam patient is in no acute distress, afebrile and hemodynamically stable, on room air. Per nursing staff her mentation is slightly improved as she does remember her name and some events from this hospital stay however she is still follow alert and oriented only to self. I spoke with her Sister Jamilah Tavarez on the phone who informs me that this is likely the 10th time that the patient has had this happen. She usually has \"amnesia\" during dialysis and is brought to the hospital and \"nothing is found out \". Best Pearson had set up a family meeting with patient's nephrologist Dr. Santo Herrmann however has not had this meeting yet. Patient gets dialysis on [facility] on Tuesday Thursday Saturday. Patient declines fever, chills, chest pain, shortness of breath, abdominal pain. She admits confusion and memory loss.   Patient on file       Family History:   No family history on file. Review of Systems:    Pertinent positives stated above in HPI. All other systems were reviewed and were negative. Objective:  CURRENT TEMPERATURE:  Temp: 97.1 °F (36.2 °C)  MAXIMUM TEMPERATURE OVER 24HRS:  Temp (24hrs), Av.7 °F (36.5 °C), Min:97.1 °F (36.2 °C), Max:98.1 °F (36.7 °C)    CURRENT RESPIRATORY RATE:  Resp: 20  CURRENT PULSE:  Heart Rate: 93  CURRENT BLOOD PRESSURE:  BP: (!) 125/49  24HR BLOOD PRESSURE RANGE:  Systolic (61OCB), L , Min:121 , UGZ:416   ; Diastolic (62NPJ), MRX:84, Min:43, Max:59    24HR INTAKE/OUTPUT:  No intake or output data in the 24 hours ending 22    Physical Exam:  Physical Exam:  General:  Pleasant and cooperative. No apparent distress. HEENT:  Normocephalic, atraumatic, mucus membranes moist.   Neck:  Trachea midline, no JVD. Chest:  Clear to auscultation bilaterally. No wheezes, rales, or rhonchi. Right chest permacath in place  CV:  Regular rate and rhythm. No murmur, no gallops, no rubs  Abdomen:  Abdomen is soft, non-tender, non-distended, no rebound or guarding. Extremities:  No lower extremity edema or cyanosis, peripheral pulses intact  Neurological: Alert and oriented to self only no focal deficits. Skin:  Warm and dry. Labs:   CBC:   Recent Labs     223 22  0454   WBC 6.8 6.4   RBC 3.05* 2.94*   HGB 9.9* 9.4*   HCT 30.2* 30.7*   MCV 99.0 104.4*   MCH 32.5 32.0   MCHC 32.8 30.6   RDW 17.3* 17.5*    220   MPV 10.0 10.0      BMP:   Recent Labs     223 22  0454    134*   K 3.8 3.6*   CL 96* 99   CO2 29 24   BUN 13 20   CREATININE 1.60* 2.13*   GLUCOSE 229* 220*   CALCIUM 9.4 8.9        Phosphorus:  No results for input(s): PHOS in the last 72 hours.   Magnesium:   Recent Labs     22  0222   MG 2.0     Albumin:   Recent Labs     22  1713   LABALBU 3.6         SPEP:   Lab Results   Component Value Date/Time    PROT 7.4 2022 05:13 PM     UPEP: No results found for: TPU     C3: No results found for: C3  C4: No results found for: C4  MPO ANCA:  No results found for: MPO . PR3 ANCA:  No results found for: PR3  Urine Sodium:  No results found for: JASON   Urine Potassium:  No results found for: KUR  Urine Chloride:  No results found for: CLU  Urine Ph:  No components found for: PO4U  Urine Osmolarity:  No results found for: OSMOU  Urine Creatinine:  No results found for: LABCREA  Urine Eosinophils: No components found for: EOSU  Urine Protein:  No results found for: TPU  Urinalysis:  U/A:   Lab Results   Component Value Date/Time    NITRU NEGATIVE 08/02/2022 09:46 PM    COLORU Dark Yellow 08/02/2022 09:46 PM    PHUR 6.5 08/02/2022 09:46 PM    WBCUA TOO NUMEROUS TO COUNT 08/02/2022 09:46 PM    RBCUA 2 TO 5 08/02/2022 09:46 PM    BACTERIA MANY 08/02/2022 09:46 PM    SPECGRAV 1.016 08/02/2022 09:46 PM    LEUKOCYTESUR LARGE 08/02/2022 09:46 PM    UROBILINOGEN Normal 08/02/2022 09:46 PM    BILIRUBINUR NEGATIVE 08/02/2022 09:46 PM    GLUCOSEU NEGATIVE 08/02/2022 09:46 PM    KETUA NEGATIVE 08/02/2022 09:46 PM         Radiology:  Reviewed as available. Assessment:  #Acute encephalopathy, possible dialysis disequilibrium syndrome  # ESRD on hemodialysis, unknown etiology, although lab data upon arrival inconsistent with the typical lab data 1 would see in an ESRD patient, as far as the creatinine is concerned. #UTI  #Type 2 diabetes  #ERICK  #Chronic hypoxic respiratory failure       Plan:  -Patient presenting with acute encephalopathy which is recurrent during her dialysis sessions. Dialysis disequilibrium syndrome has been mentioned to the family in the past and may be plausible in this situation. Creatinine only 1.6 on arrival along with BUN of 13 are quite low for dialysis patient. There may need to be some adjustments in the patient's dialysis prescription, including possible decrease/discontinuation of the dialysis.   -Patient also does suffer from chronic UTIs which she is being treated for with Rocephin. Urine culture pending  -Monitor labs, strict intake and output.  -We will hold on dialysis for at least the next 48 hours and monitor the patient's lab work and neurological condition.  -Later in the week, we will attempt to come up with a plan for the patient's outpatient dialysis prescription. We will need to involve the patient's primary nephrologist, Dr. Johnathon Cevallos, as far as the outpatient plan is concerned. The planning meeting later in the week should include the patient and the patient's sister, Emerson Lange, who has been informed by the nursing staff.  -Neurology is following and MRI brain along with EEG are pending. CT head showing no acute findings. Thank you for the consultation. Please do not hesitate to call with questions. Electronically signed by Tabitha Woodson DO on 8/3/2022 at 9:14 AM    Attending Physician Statement  I have discussed the care of Dotty Dhillon, including pertinent history and exam findings,  with the Fellow/Residentt. I have reviewed the key elements of all parts of the encounter with the Fellow/ Resident. I agree with the assessment, plan and orders as documented by the resident.     Bailey Watson MD MD, MRCP Ana Pagan, FACP   8/4/2022 1:15 PM    Nephrology [address] [place] [address]

## 2022-08-03 NOTE — PLAN OF CARE
Problem: ABCDS Injury Assessment  Goal: Absence of physical injury  Outcome: Progressing  Flowsheets (Taken 8/3/2022 1110)  Absence of Physical Injury: Implement safety measures based on patient assessment     Problem: Safety - Adult  Goal: Free from fall injury  Outcome: Progressing     Problem: Skin/Tissue Integrity  Goal: Absence of new skin breakdown  Description: 1. Monitor for areas of redness and/or skin breakdown  2. Assess vascular access sites hourly  3. Every 4-6 hours minimum:  Change oxygen saturation probe site  4. Every 4-6 hours:  If on nasal continuous positive airway pressure, respiratory therapy assess nares and determine need for appliance change or resting period.   Outcome: Progressing

## 2022-08-04 PROBLEM — A49.1 VANCOMYCIN RESISTANT ENTEROCOCCUS INFECTION: Status: ACTIVE | Noted: 2022-08-02

## 2022-08-04 PROBLEM — Z16.21 VANCOMYCIN RESISTANT ENTEROCOCCUS INFECTION: Status: ACTIVE | Noted: 2022-08-02

## 2022-08-04 PROBLEM — N30.90 CYSTITIS: Status: ACTIVE | Noted: 2022-08-04

## 2022-08-04 PROBLEM — A49.8 INFECTION DUE TO ESBL-PRODUCING KLEBSIELLA PNEUMONIAE: Status: ACTIVE | Noted: 2022-08-04

## 2022-08-04 PROBLEM — E11.65 TYPE 2 DIABETES MELLITUS WITH HYPERGLYCEMIA, WITH LONG-TERM CURRENT USE OF INSULIN (HCC): Status: ACTIVE | Noted: 2022-08-04

## 2022-08-04 PROBLEM — A49.1 VRE INFECTION (VANCOMYCIN RESISTANT ENTEROCOCCUS): Status: ACTIVE | Noted: 2022-08-04

## 2022-08-04 PROBLEM — E66.812 CLASS 2 SEVERE OBESITY DUE TO EXCESS CALORIES WITH SERIOUS COMORBIDITY AND BODY MASS INDEX (BMI) OF 35.0 TO 35.9 IN ADULT: Status: ACTIVE | Noted: 2022-08-04

## 2022-08-04 PROBLEM — Z79.4 TYPE 2 DIABETES MELLITUS WITH HYPERGLYCEMIA, WITH LONG-TERM CURRENT USE OF INSULIN (HCC): Status: ACTIVE | Noted: 2022-08-04

## 2022-08-04 PROBLEM — E66.01 CLASS 2 SEVERE OBESITY DUE TO EXCESS CALORIES WITH SERIOUS COMORBIDITY AND BODY MASS INDEX (BMI) OF 35.0 TO 35.9 IN ADULT (HCC): Status: ACTIVE | Noted: 2022-08-04

## 2022-08-04 PROBLEM — Z16.21 VRE INFECTION (VANCOMYCIN RESISTANT ENTEROCOCCUS): Status: ACTIVE | Noted: 2022-08-04

## 2022-08-04 PROBLEM — E87.8 DIALYSIS DISEQUILIBRIUM SYNDROME: Status: ACTIVE | Noted: 2022-08-04

## 2022-08-04 PROBLEM — Z16.12 INFECTION DUE TO ESBL-PRODUCING KLEBSIELLA PNEUMONIAE: Status: ACTIVE | Noted: 2022-08-04

## 2022-08-04 PROBLEM — N30.00 ACUTE CYSTITIS: Status: ACTIVE | Noted: 2022-08-04

## 2022-08-04 PROBLEM — R41.0 DISORIENTATION: Status: ACTIVE | Noted: 2022-08-04

## 2022-08-04 LAB
ANION GAP SERPL CALCULATED.3IONS-SCNC: 14 MMOL/L (ref 9–17)
ANTI DNA DOUBLE STRANDED: 1.8 IU/ML
ANTI-NUCLEAR ANTIBODY (ANA): NEGATIVE
BUN BLDV-MCNC: 29 MG/DL (ref 8–23)
CALCIUM SERPL-MCNC: 9.1 MG/DL (ref 8.6–10.4)
CHLORIDE BLD-SCNC: 99 MMOL/L (ref 98–107)
CO2: 23 MMOL/L (ref 20–31)
CREAT SERPL-MCNC: 3.1 MG/DL (ref 0.5–0.9)
CULTURE: ABNORMAL
CULTURE: ABNORMAL
ENA ANTIBODIES SCREEN: 0.4 U/ML
GFR AFRICAN AMERICAN: 18 ML/MIN
GFR NON-AFRICAN AMERICAN: 15 ML/MIN
GFR SERPL CREATININE-BSD FRML MDRD: ABNORMAL ML/MIN/{1.73_M2}
GLUCOSE BLD-MCNC: 365 MG/DL (ref 65–105)
GLUCOSE BLD-MCNC: 374 MG/DL (ref 65–105)
GLUCOSE BLD-MCNC: 402 MG/DL (ref 65–105)
GLUCOSE BLD-MCNC: 432 MG/DL (ref 65–105)
GLUCOSE BLD-MCNC: 469 MG/DL (ref 70–99)
LV EF: 55 %
LVEF MODALITY: NORMAL
Lab: ABNORMAL
POTASSIUM SERPL-SCNC: 4.4 MMOL/L (ref 3.7–5.3)
SODIUM BLD-SCNC: 136 MMOL/L (ref 135–144)
SPECIMEN DESCRIPTION: ABNORMAL

## 2022-08-04 PROCEDURE — 36415 COLL VENOUS BLD VENIPUNCTURE: CPT

## 2022-08-04 PROCEDURE — 96365 THER/PROPH/DIAG IV INF INIT: CPT

## 2022-08-04 PROCEDURE — G0378 HOSPITAL OBSERVATION PER HR: HCPCS

## 2022-08-04 PROCEDURE — 96366 THER/PROPH/DIAG IV INF ADDON: CPT

## 2022-08-04 PROCEDURE — 6370000000 HC RX 637 (ALT 250 FOR IP): Performed by: FAMILY MEDICINE

## 2022-08-04 PROCEDURE — 6360000002 HC RX W HCPCS: Performed by: INTERNAL MEDICINE

## 2022-08-04 PROCEDURE — 99232 SBSQ HOSP IP/OBS MODERATE 35: CPT | Performed by: INTERNAL MEDICINE

## 2022-08-04 PROCEDURE — 82947 ASSAY GLUCOSE BLOOD QUANT: CPT

## 2022-08-04 PROCEDURE — 93880 EXTRACRANIAL BILAT STUDY: CPT

## 2022-08-04 PROCEDURE — 6370000000 HC RX 637 (ALT 250 FOR IP): Performed by: INTERNAL MEDICINE

## 2022-08-04 PROCEDURE — 2580000003 HC RX 258: Performed by: INTERNAL MEDICINE

## 2022-08-04 PROCEDURE — 96372 THER/PROPH/DIAG INJ SC/IM: CPT

## 2022-08-04 PROCEDURE — 6360000002 HC RX W HCPCS: Performed by: FAMILY MEDICINE

## 2022-08-04 PROCEDURE — 2500000003 HC RX 250 WO HCPCS: Performed by: INTERNAL MEDICINE

## 2022-08-04 PROCEDURE — 99225 PR SBSQ OBSERVATION CARE/DAY 25 MINUTES: CPT | Performed by: INTERNAL MEDICINE

## 2022-08-04 PROCEDURE — 80048 BASIC METABOLIC PNL TOTAL CA: CPT

## 2022-08-04 PROCEDURE — 96376 TX/PRO/DX INJ SAME DRUG ADON: CPT

## 2022-08-04 PROCEDURE — 2580000003 HC RX 258: Performed by: NURSE PRACTITIONER

## 2022-08-04 PROCEDURE — 93306 TTE W/DOPPLER COMPLETE: CPT

## 2022-08-04 PROCEDURE — 99222 1ST HOSP IP/OBS MODERATE 55: CPT | Performed by: INTERNAL MEDICINE

## 2022-08-04 RX ORDER — LINEZOLID 600 MG/1
600 TABLET, FILM COATED ORAL EVERY 12 HOURS SCHEDULED
Status: DISCONTINUED | OUTPATIENT
Start: 2022-08-04 | End: 2022-08-09 | Stop reason: HOSPADM

## 2022-08-04 RX ORDER — RANOLAZINE 500 MG/1
1000 TABLET, EXTENDED RELEASE ORAL 2 TIMES DAILY
Status: DISCONTINUED | OUTPATIENT
Start: 2022-08-04 | End: 2022-08-09 | Stop reason: HOSPADM

## 2022-08-04 RX ORDER — PANTOPRAZOLE SODIUM 40 MG/1
40 TABLET, DELAYED RELEASE ORAL DAILY
Status: DISCONTINUED | OUTPATIENT
Start: 2022-08-04 | End: 2022-08-09 | Stop reason: HOSPADM

## 2022-08-04 RX ORDER — INSULIN GLARGINE 100 [IU]/ML
50 INJECTION, SOLUTION SUBCUTANEOUS 2 TIMES DAILY
Status: DISCONTINUED | OUTPATIENT
Start: 2022-08-04 | End: 2022-08-05

## 2022-08-04 RX ORDER — DOCUSATE SODIUM 100 MG/1
100 CAPSULE, LIQUID FILLED ORAL 2 TIMES DAILY
Status: DISCONTINUED | OUTPATIENT
Start: 2022-08-04 | End: 2022-08-09 | Stop reason: HOSPADM

## 2022-08-04 RX ORDER — DULOXETIN HYDROCHLORIDE 30 MG/1
30 CAPSULE, DELAYED RELEASE ORAL DAILY
Status: DISCONTINUED | OUTPATIENT
Start: 2022-08-04 | End: 2022-08-09 | Stop reason: HOSPADM

## 2022-08-04 RX ORDER — INSULIN LISPRO 100 [IU]/ML
10 INJECTION, SOLUTION INTRAVENOUS; SUBCUTANEOUS
Status: DISCONTINUED | OUTPATIENT
Start: 2022-08-04 | End: 2022-08-08

## 2022-08-04 RX ORDER — FEBUXOSTAT 40 MG/1
40 TABLET, FILM COATED ORAL DAILY
Status: DISCONTINUED | OUTPATIENT
Start: 2022-08-04 | End: 2022-08-09 | Stop reason: HOSPADM

## 2022-08-04 RX ADMIN — DOCUSATE SODIUM 100 MG: 100 CAPSULE ORAL at 21:31

## 2022-08-04 RX ADMIN — SODIUM CHLORIDE, PRESERVATIVE FREE 10 ML: 5 INJECTION INTRAVENOUS at 21:31

## 2022-08-04 RX ADMIN — INSULIN LISPRO 10 UNITS: 100 INJECTION, SOLUTION INTRAVENOUS; SUBCUTANEOUS at 18:47

## 2022-08-04 RX ADMIN — SODIUM BICARBONATE 650 MG: 648 TABLET ORAL at 08:58

## 2022-08-04 RX ADMIN — LINEZOLID 600 MG: 600 TABLET, FILM COATED ORAL at 21:31

## 2022-08-04 RX ADMIN — SODIUM BICARBONATE 650 MG: 648 TABLET ORAL at 14:00

## 2022-08-04 RX ADMIN — CEFTRIAXONE SODIUM 1000 MG: 10 INJECTION, POWDER, FOR SOLUTION INTRAVENOUS at 01:23

## 2022-08-04 RX ADMIN — HEPARIN SODIUM 5000 UNITS: 5000 INJECTION INTRAVENOUS; SUBCUTANEOUS at 14:00

## 2022-08-04 RX ADMIN — CARVEDILOL 3.12 MG: 3.12 TABLET, FILM COATED ORAL at 18:32

## 2022-08-04 RX ADMIN — INSULIN LISPRO 4 UNITS: 100 INJECTION, SOLUTION INTRAVENOUS; SUBCUTANEOUS at 09:02

## 2022-08-04 RX ADMIN — SODIUM BICARBONATE 650 MG: 648 TABLET ORAL at 21:31

## 2022-08-04 RX ADMIN — ASPIRIN 81 MG: 81 TABLET, COATED ORAL at 10:45

## 2022-08-04 RX ADMIN — INSULIN GLARGINE 50 UNITS: 100 INJECTION, SOLUTION SUBCUTANEOUS at 13:00

## 2022-08-04 RX ADMIN — INSULIN LISPRO 4 UNITS: 100 INJECTION, SOLUTION INTRAVENOUS; SUBCUTANEOUS at 21:28

## 2022-08-04 RX ADMIN — HEPARIN SODIUM 5000 UNITS: 5000 INJECTION INTRAVENOUS; SUBCUTANEOUS at 06:31

## 2022-08-04 RX ADMIN — SODIUM CHLORIDE, PRESERVATIVE FREE 10 ML: 5 INJECTION INTRAVENOUS at 10:45

## 2022-08-04 RX ADMIN — CARVEDILOL 3.12 MG: 3.12 TABLET, FILM COATED ORAL at 08:59

## 2022-08-04 RX ADMIN — FUROSEMIDE 80 MG: 40 TABLET ORAL at 21:36

## 2022-08-04 RX ADMIN — ATORVASTATIN CALCIUM 10 MG: 10 TABLET, FILM COATED ORAL at 08:58

## 2022-08-04 RX ADMIN — INSULIN LISPRO 4 UNITS: 100 INJECTION, SOLUTION INTRAVENOUS; SUBCUTANEOUS at 18:46

## 2022-08-04 RX ADMIN — FUROSEMIDE 80 MG: 40 TABLET ORAL at 08:58

## 2022-08-04 RX ADMIN — TAMSULOSIN HYDROCHLORIDE 0.4 MG: 0.4 CAPSULE ORAL at 08:58

## 2022-08-04 RX ADMIN — RANOLAZINE 1000 MG: 500 TABLET, FILM COATED, EXTENDED RELEASE ORAL at 21:31

## 2022-08-04 RX ADMIN — PANTOPRAZOLE SODIUM 40 MG: 40 TABLET, DELAYED RELEASE ORAL at 18:50

## 2022-08-04 RX ADMIN — MEROPENEM 1000 MG: 1 INJECTION, POWDER, FOR SOLUTION INTRAVENOUS at 17:18

## 2022-08-04 RX ADMIN — FEBUXOSTAT 40 MG: 40 TABLET ORAL at 18:50

## 2022-08-04 RX ADMIN — BUSPIRONE HYDROCHLORIDE 10 MG: 10 TABLET ORAL at 08:58

## 2022-08-04 RX ADMIN — BUSPIRONE HYDROCHLORIDE 10 MG: 10 TABLET ORAL at 14:00

## 2022-08-04 RX ADMIN — INSULIN LISPRO 4 UNITS: 100 INJECTION, SOLUTION INTRAVENOUS; SUBCUTANEOUS at 12:00

## 2022-08-04 RX ADMIN — INSULIN GLARGINE 50 UNITS: 100 INJECTION, SOLUTION SUBCUTANEOUS at 21:28

## 2022-08-04 RX ADMIN — HEPARIN SODIUM 5000 UNITS: 5000 INJECTION INTRAVENOUS; SUBCUTANEOUS at 21:31

## 2022-08-04 ASSESSMENT — ENCOUNTER SYMPTOMS
COUGH: 0
RHINORRHEA: 0
SINUS PAIN: 0
BACK PAIN: 0
CONSTIPATION: 0
EYE PAIN: 0
SHORTNESS OF BREATH: 0
ABDOMINAL DISTENTION: 0
CHOKING: 0
CHEST TIGHTNESS: 0
ABDOMINAL PAIN: 0
COLOR CHANGE: 0
DIARRHEA: 0
EYE ITCHING: 0

## 2022-08-04 NOTE — PROGRESS NOTES
no rubs  Abdomen:  Abdomen is soft, non-tender, non-distended, no rebound or guarding. Extremities:  No lower extremity edema or cyanosis, peripheral pulses intact  Neurological:  AAOx3. No focal deficits. Skin:  Warm and dry.   Edema}    CURRENT MEDICATIONS      insulin lispro (HUMALOG) injection vial 0-4 Units, TID WC  insulin lispro (HUMALOG) injection vial 0-4 Units, Nightly  glucose chewable tablet 16 g, PRN  dextrose bolus 10% 125 mL, PRN   Or  dextrose bolus 10% 250 mL, PRN  glucagon (rDNA) injection 1 mg, PRN  dextrose 10 % infusion, Continuous PRN  cefTRIAXone (ROCEPHIN) 1,000 mg in sterile water 10 mL IV syringe, Q24H  heparin (porcine) injection 5,000 Units, 3 times per day  aspirin EC tablet 81 mg, Daily  atorvastatin (LIPITOR) tablet 10 mg, Daily  busPIRone (BUSPAR) tablet 10 mg, TID  carvedilol (COREG) tablet 3.125 mg, BID WC  furosemide (LASIX) tablet 80 mg, BID  sodium bicarbonate tablet 650 mg, TID  tamsulosin (FLOMAX) capsule 0.4 mg, Daily  traZODone (DESYREL) tablet 50 mg, Nightly  sodium chloride flush 0.9 % injection 5-40 mL, 2 times per day  sodium chloride flush 0.9 % injection 10 mL, PRN  0.9 % sodium chloride infusion, PRN  ondansetron (ZOFRAN-ODT) disintegrating tablet 4 mg, Q8H PRN   Or  ondansetron (ZOFRAN) injection 4 mg, Q6H PRN  polyethylene glycol (GLYCOLAX) packet 17 g, Daily PRN  acetaminophen (TYLENOL) tablet 650 mg, Q6H PRN   Or  acetaminophen (TYLENOL) suppository 650 mg, Q6H PRN          LABS      CBC:   Recent Labs     08/02/22 1713 08/03/22  0454   WBC 6.8 6.4   RBC 3.05* 2.94*   HGB 9.9* 9.4*   HCT 30.2* 30.7*   MCV 99.0 104.4*   MCH 32.5 32.0   MCHC 32.8 30.6   RDW 17.3* 17.5*    220   MPV 10.0 10.0      BMP:   Recent Labs     08/02/22  1713 08/03/22  0454    134*   K 3.8 3.6*   CL 96* 99   CO2 29 24   BUN 13 20   CREATININE 1.60* 2.13*   GLUCOSE 229* 220*   CALCIUM 9.4 8.9      BNP:No results found for: BNP  PHOSPHORUS:  No results for input(s): PHOS in the last 72 hours. MAGNESIUM:   Recent Labs     08/03/22  0222   MG 2.0     ALBUMIN:   Recent Labs     08/02/22  1713   LABALBU 3.6     IRON:  No results found for: IRON  IRON SATURATION:  No results found for: LABIRON  TIBC:  No results found for: TIBC  FERRITIN:  No results found for: FERRITIN  RUSSELL: No results found for: RUSSELL    SPEP:   Lab Results   Component Value Date/Time    PROT 7.4 08/02/2022 05:13 PM     UPEP: No results found for: TPU   HEPBSAG:No results found for: HEPBSAG  HEPCAB:No results found for: HEPCAB  C3: No results found for: C3  C4: No results found for: C4  MPO ANCA: No results found for: MPO . PR3 ANCA:  No results found for: PR3  URINE SODIUM:  No results found for: JASON   URINE POTASSIUM:  No results found for: KUR  URINE CHLORIDE:  No results found for: CLU  URINE PH:  No components found for: PO4U  URINE OSMOLARITY:  No results found for: OSMOU  URINE CREATININE:  No results found for: LABCREA  URINE EOSINOPHILS: No components found for: EOSU  URINE PROTEIN:  No results found for: TPU  URINALYSIS:  U/A:   Lab Results   Component Value Date/Time    NITRU NEGATIVE 08/02/2022 09:46 PM    COLORU Dark Yellow 08/02/2022 09:46 PM    PHUR 6.5 08/02/2022 09:46 PM    WBCUA TOO NUMEROUS TO COUNT 08/02/2022 09:46 PM    RBCUA 2 TO 5 08/02/2022 09:46 PM    BACTERIA MANY 08/02/2022 09:46 PM    SPECGRAV 1.016 08/02/2022 09:46 PM    LEUKOCYTESUR LARGE 08/02/2022 09:46 PM    UROBILINOGEN Normal 08/02/2022 09:46 PM    BILIRUBINUR NEGATIVE 08/02/2022 09:46 PM    GLUCOSEU NEGATIVE 08/02/2022 09:46 PM    KETUA NEGATIVE 08/02/2022 09:46 PM     ANTIGBM:No results found for: [address] as available. ASSESSMENT    #Acute encephalopathy, possible dialysis disequilibrium syndrome  # ESRD on hemodialysis, unknown etiology, although lab data upon arrival inconsistent with the typical lab data 1 would see in an ESRD patient, as far as the creatinine is concerned.   #UTI  #Type 2 diabetes, uncontrolled  #ERICK  #Chronic hypoxic respiratory failure    PLAN      -Continue to monitor kidney function while off dialysis. We will need to reach out to Dr. Paty Fontenot to discuss further dialysis plan. Patient did make 800 mL of urine yesterday. Continue strict I's and O's  -Patient on 50 units Lantus twice daily prior to this admission. Blood sugar in 400s this morning. Goal blood sugar 140-180.  -Continue Rocephin  -Neurology following    Please do not hesitate to call with questions.     Electronically signed by Arely Casillas DO on 8/4/2022 at 10:11 AM

## 2022-08-04 NOTE — PROGRESS NOTES
She was at [facility] on June 26 with confusion along with right side numbness . MRI of Head with mild chronic periventricular small vessel ischemia . EEG mild diffuse slowing   Was found to have UTI in ED possibly worsening her mental states. With hx of recurrent UTI. Past Medical History:     No past medical history on file. Past Surgical History:     No past surgical history on file. Medications Prior to Admission:     Prior to Admission medications    Medication Sig Start Date End Date Taking? Authorizing Provider   atorvastatin (LIPITOR) 80 MG tablet Take 80 mg by mouth in the morning. Yes Historical Provider, MD   busPIRone (BUSPAR) 10 MG tablet Take 15 mg by mouth in the morning and 15 mg in the evening. Yes Historical Provider, MD   mirtazapine (REMERON) 15 MG tablet Take by mouth nightly   Yes Historical Provider, MD   gabapentin (NEURONTIN) 300 MG capsule Take 300 mg by mouth in the morning. Yes Historical Provider, MD   tiZANidine (ZANAFLEX) 4 MG tablet Take by mouth every 6 hours as needed   Yes Historical Provider, MD   carvedilol (COREG) 3.125 MG tablet Take 3.125 mg by mouth in the morning and 3.125 mg in the evening. Take with meals. Yes Historical Provider, MD   miconazole (MICOTIN) 2 % powder Apply topically 2 times daily Apply topically 2 times daily. Yes Historical Provider, MD   melatonin 3 MG TABS tablet Take 10 mg by mouth nightly   Yes Historical Provider, MD   traZODone (DESYREL) 50 MG tablet Take 50 mg by mouth nightly   Yes Historical Provider, MD   furosemide (LASIX) 80 MG tablet Take 80 mg by mouth in the morning and 80 mg before bedtime. Yes Historical Provider, MD   sodium bicarbonate 650 MG tablet Take 650 mg by mouth in the morning and 650 mg at noon and 650 mg before bedtime. Yes Historical Provider, MD   docusate sodium (COLACE) 100 MG capsule Take 100 mg by mouth in the morning and 100 mg before bedtime.    Yes Historical Provider, MD   tamsulosin [facility]) 0.4 MG capsule Take 0.4 mg by mouth in the morning. Yes Historical Provider, MD   aspirin 81 MG chewable tablet Take 81 mg by mouth in the morning. Yes Historical Provider, MD   ranolazine (RANEXA) 500 MG extended release tablet Take 1,000 mg by mouth in the morning and 1,000 mg before bedtime. Yes Historical Provider, MD   pantoprazole (PROTONIX) 40 MG tablet Take 40 mg by mouth in the morning. Yes Historical Provider, MD   insulin lispro (HUMALOG) 100 UNIT/ML injection vial Inject into the skin 3 times daily (before meals) Unknown dose scale   Yes Historical Provider, MD   acetaminophen (TYLENOL) 325 MG tablet Take 650 mg by mouth every 6 hours as needed for Pain   Yes Historical Provider, MD   Baclofen (LIORESAL) 5 MG tablet Take 2.5 mg by mouth 2 times daily as needed   Yes Historical Provider, MD   insulin glargine (LANTUS) 100 UNIT/ML injection vial Inject 50 Units into the skin 2 times daily   Yes Historical Provider, MD   DULoxetine (CYMBALTA) 30 MG extended release capsule Take 30 mg by mouth in the morning. Yes Historical Provider, MD   febuxostat (ULORIC) 40 MG TABS tablet Take 40 mg by mouth in the morning. Yes Historical Provider, MD   loperamide (IMODIUM) 2 MG capsule Take 2 mg by mouth 2 times daily as needed for Diarrhea   Yes Historical Provider, MD   lidocaine-prilocaine (EMLA) 2.5-2.5 % cream Apply topically as needed for Pain Apply topically as needed on dialysis days, Tues, Thurs, Sat   Yes Historical Provider, MD        Allergies:     Latex and Metformin and related    Social History:     Tobacco:    has no history on file for tobacco use. Alcohol:      has no history on file for alcohol use. Drug Use:  has no history on file for drug use. Family History:     No family history on file.     Review of Systems:     Review of Systems    Physical Exam:   /64   Pulse 88   Temp 98.2 °F (36.8 °C) (Oral)   Resp 18   Ht 5' 5\" (1.651 m)   Wt 225 lb 8.5 oz (102.3 kg)   SpO2 95% BMI 37.53 kg/m²   Temp (24hrs), Av.3 °F (36.8 °C), Min:98.1 °F (36.7 °C), Max:98.6 °F (37 °C)    Recent Labs     22  1239 22  1615 22  0856   POCGLU 294* 306* 280* 432*       Intake/Output Summary (Last 24 hours) at 2022 1034  Last data filed at 8/3/2022 1835  Gross per 24 hour   Intake 540 ml   Output 400 ml   Net 140 ml         Neurologic Exam     Mental status    Oriented only to self; following all commands;  speech is fluent, no dysarthria, aphasia. Cranial nerves    II - visual fields intact to confrontation; pupils reactive  III, IV, VI - extraocular muscles intact; no CECELIA; no nystagmus; no ptosis  V - normal facial sensation                                                               VII - normal facial symmetry                                                             VIII - intact hearing                                                                             IX, X - symmetrical palate elevation                                               XI - symmetrical shoulder shrug                                                       XII - midline tongue without atrophy or fasciculation      Motor function Strength:   4/5 RUE, 4/5 RLE  4+/5 LUE, 4+/5  LLE  Normal bulk and tone. Sensory function Decreased light touch and pinprick sensation over the right face, right upper extremity, right lower extremity  Decreased sensation to light touch and pinprick up to mid shin bilaterally and in both hands bilaterally      Cerebellar Intact finger-nose-finger testing. Intact heel-shin testing. No dysdiadochokinesia present. No tremors                          Reflex function 2/4 symmetric throughout . Downgoing plantar response bilaterally.  (-)Meza's sign bilaterally      Gait                  Not test       Investigations:      Laboratory Testing:  Recent Results (from the past 24 hour(s))   POC Glucose Fingerstick    Collection Time: 22 12:39 PM Result Value Ref Range    POC Glucose 294 (H) 65 - 105 mg/dL   POC Glucose Fingerstick    Collection Time: 08/03/22  4:15 PM   Result Value Ref Range    POC Glucose 306 (H) 65 - 105 mg/dL   Vitamin B12 & Folate    Collection Time: 08/03/22  4:30 PM   Result Value Ref Range    Vitamin B-12 535 232 - 1245 pg/mL    Folate 9.8 >4.8 ng/mL   Ammonia    Collection Time: 08/03/22  4:30 PM   Result Value Ref Range    Ammonia 21 11 - 51 umol/L   TSH with Reflex    Collection Time: 08/03/22  4:30 PM   Result Value Ref Range    TSH 1.89 0.30 - 5.00 uIU/mL   POC Glucose Fingerstick    Collection Time: 08/03/22  8:29 PM   Result Value Ref Range    POC Glucose 280 (H) 65 - 105 mg/dL   POC Glucose Fingerstick    Collection Time: 08/04/22  8:56 AM   Result Value Ref Range    POC Glucose 432 (HH) 65 - 105 mg/dL     Recent Labs     08/03/22  0454   WBC 6.4   RBC 2.94*   HGB 9.4*   HCT 30.7*   .4*   MCH 32.0   MCHC 30.6   RDW 17.5*      MPV 10.0     Recent Labs     08/02/22  1713 08/03/22  0454    134*   K 3.8 3.6*   CL 96* 99   CO2 29 24   BUN 13 20   CREATININE 1.60* 2.13*   GLUCOSE 229* 220*   CALCIUM 9.4 8.9   PROT 7.4  --    LABALBU 3.6  --    BILITOT 0.48  --    ALKPHOS 138*  --    AST 20  --    ALT 14  --      No results found for: LABA1C    Assessment :      Primary Problem  Dialysis disequilibrium syndrome    Active Hospital Problems    Diagnosis Date Noted    Dialysis disequilibrium syndrome [E87.8] 08/04/2022     Priority: Medium    ESRD (end stage renal disease) on dialysis ([facility] [address] [N18.6, Z99.2] 08/03/2022     Priority: Medium    Encephalopathy [G93.40] 08/03/2022     Priority: Medium    Syncope and collapse [R55] 08/03/2022     Priority: Medium    Altered mental status [R41.82] 08/02/2022     Priority: Medium       Patient is a 57 y.o. female pmhx of ESRD on HD, HTN, DM, presented with confusion and altered mental status during dialysis.  Likely has metabolic encephalopathy with underlying cognitive disorder. Plan:     Metabolic Encephalopathy likely dialysis dysequilibrium syndrome and UTI, possible underling cognitive disorder. -CT head: no acute abnormalities. -EEG mild intermittent generalized slowing with slow sharp wave   -MRI 8/4: Chronic microvascular disease without acute intracranial abnormality. Chronic sinusitis most pronounced in the right maxillary sinus. -TSH 1.89  -Ammonia 21  -Folate 9.8, b12 535  -Nephrology following: Hold on dialysis for at least the next 48 hours   -Hyperglycemia on LDSS, management per IM       Follow-up further recommendations after discussing the case with attending  The plan was discussed with the patient, patient's family and the medical staff. Consultations:   IP CONSULT TO NEUROLOGY  IP CONSULT TO NEPHROLOGY  IP CONSULT TO HOSPITALIST  IP CONSULT TO NEUROLOGY  IP CONSULT TO NEPHROLOGY    Patient is admitted as inpatient status because of co-morbidities listed above, severity of signs and symptoms as outlined, requirement for current medical therapies and most importantly because of direct risk to patient if care not provided in a hospital setting. Joan Schmitt MD  8/4/2022  10:34 AM    Copy sent to Dr. Zhang primary care provider on file.

## 2022-08-04 NOTE — PROGRESS NOTES
[facility]  Office: [address], DO, Modesta Gonzáles, DO, Mic Bales, DO, Safia Garcia Blood, DO, Jakub Jewell MD, Rose Powell MD, Muna Gonzalez MD, Miranda Coyne MD,  Lucita San MD, Erika Mcpherson MD, Ousmane Brady, DO, Jonel Johnston MD,  Jose Angel Bowling MD, Lieutenant El MD, Octavia Siegel, DO, Razia Clements MD, Latonya Alvarado MD, Janet Dejesus MD, Efren Ugarte, DO, Sebastian Hobbs MD, Marilyn Nelson MD, Kenneth Mccarthy, CNP,  Luan Mata, CNP, Negrita Salazar, CNP, Tony Guzmán, CNP, Vitor Vazquez PA-C, Marianne Thorne, [facility], Tae Snyder, CNP, Natalya Falcon, CNP, Pat Olson, CNP, Sharifa Matos, CNP, Lesly Sherman, CNP, Capo Solares, CNS, Mari Cooley, [facility], Clint Neely, CNP, Zara Ndiaye, CNP, Juan Narayan, [facility]           [address]    Progress Note    8/4/2022    5:49 PM    Name:   Martinez Nowak  MRN:     [id number]     Acct:      [de-identified]   Room:   [address] [place] [address] Day:  0  Admit Date:  8/2/2022  4:33 PM    PCP:   No primary care provider on file. Code Status:  Full Code    Subjective:     C/C:   Chief Complaint   Patient presents with    Altered Mental Status     Interval History Status: First time seeing patient. Patient sitting in chair this morning, opens eyes to verbal commands. She seems confused, only able to answer some questions appropriately. She knows she is in the hospital but does not know her name, does not know the year and cannot really answer any other questions. No fevers, chills, nausea, vomiting, diarrhea  Glucose elevated 455 this morning    Brief History:     77-year-old female initially presented to hospital for altered mental status during dialysis. She was found to have UTI, cultures are growing Klebsiella and VRE. She was seen by infectious disease who started her on meropenem and Zyvox.   Patient was also seen by nephrology, there was concern for dialysis disequilibrium syndrome so dialysis was held and patient response was monitored. Otherwise, patient glucose was uncontrolled    Review of Systems:   Pt not a good historian due to confusion but she denies the following     Constitutional:  negative for chills, fevers, sweats  Respiratory:  negative for cough, dyspnea on exertion, shortness of breath, wheezing  Cardiovascular:  negative for chest pain, chest pressure/discomfort, lower extremity edema, palpitations  Gastrointestinal:  negative for abdominal pain, constipation, diarrhea, nausea, vomiting  Neurological:  negative for dizziness, headache    Medications: Allergies: Allergies   Allergen Reactions    Latex Hives    Metformin And Related Diarrhea       Current Meds:   Scheduled Meds:    insulin glargine  50 Units SubCUTAneous BID    linezolid  600 mg Oral 2 times per day    meropenem  1,000 mg IntraVENous Q24H    insulin lispro  0-4 Units SubCUTAneous TID WC    insulin lispro  0-4 Units SubCUTAneous Nightly    heparin (porcine)  5,000 Units SubCUTAneous 3 times per day    aspirin  81 mg Oral Daily    atorvastatin  10 mg Oral Daily    busPIRone  10 mg Oral TID    carvedilol  3.125 mg Oral BID WC    furosemide  80 mg Oral BID    sodium bicarbonate  650 mg Oral TID    tamsulosin  0.4 mg Oral Daily    traZODone  50 mg Oral Nightly    sodium chloride flush  5-40 mL IntraVENous 2 times per day     Continuous Infusions:    dextrose      sodium chloride       PRN Meds: glucose, dextrose bolus **OR** dextrose bolus, glucagon (rDNA), dextrose, sodium chloride flush, sodium chloride, ondansetron **OR** ondansetron, polyethylene glycol, acetaminophen **OR** acetaminophen    Data:     Past Medical History:   has no past medical history on file. Social History:        Family History: No family history on file.     Vitals:  /64   Pulse 88   Temp 98.2 °F (36.8 °C) (Oral)   Resp 18   Ht 5' 5\" (1.651 m)   Wt 225 lb 8.5 oz (102.3 kg)   SpO2 95%   BMI 37.53 kg/m²   Temp (24hrs), Av.2 °F (36.8 °C), Min:98.1 °F (36.7 °C), Max:98.2 °F (36.8 °C)    Recent Labs     22  0856 22  1229 22  1720   POCGLU 280* 432* 402* 365*       I/O (24Hr):     Intake/Output Summary (Last 24 hours) at 2022 1749  Last data filed at 8/3/2022 1835  Gross per 24 hour   Intake 360 ml   Output --   Net 360 ml       Labs:  Hematology:  Recent Labs     22  0454   WBC 6.8  --  6.4   RBC 3.05*  --  2.94*   HGB 9.9*  --  9.4*   HCT 30.2*  --  30.7*   MCV 99.0  --  104.4*   MCH 32.5  --  32.0   MCHC 32.8  --  30.6   RDW 17.3*  --  17.5*     --  220   MPV 10.0  --  10.0   SEDRATE  --  87*  --    CRP  --  9.8*  --    INR  --   --  0.9     Chemistry:  Recent Labs     22  0949     --   --  134* 136   K 3.8  --   --  3.6* 4.4   CL 96*  --   --  99 99   CO2 29  --   --  24 23   GLUCOSE 229*  --   --  220* 469*   BUN 13  --   --  20 29*   CREATININE 1.60*  --   --  2.13* 3.10*   MG  --   --  2.0  --   --    ANIONGAP 10  --   --  11 14   LABGLOM  --   --   --  23* 15*   GFRAA  --   --   --  28* 18*   CALCIUM 9.4  --   --  8.9 9.1   TROPHS 101* 90*  --   --   --    LACTACIDWB  --   --  1.7  --   --      Recent Labs     22  1713 22  0104 22  0222 22  0722 22  1239 22  1615 22  1630 22  2029 22  0856 22  1229 22  1720   PROT 7.4  --   --   --   --   --   --   --   --   --   --    LABALBU 3.6  --   --   --   --   --   --   --   --   --   --    TSH  --   --   --   --   --   --  1.89  --   --   --   --    AST 20  --   --   --   --   --   --   --   --   --   --    ALT 14  --   --   --   --   --   --   --   --   --   --    ALKPHOS 138*  --   --   --   --   --   --   --   --   --   --    LABGGT  --   --  43*  --   --   --   --   --   --   --   --    BILITOT 0.48  --   --   -- --   --   --   --   --   --   --    AMMONIA 14  --   --   --   --   --  21  --   --   --   --    POCGLU  --    < >  --    < > 294* 306*  --  280* 432* 402* 365*    < > = values in this interval not displayed. ABG:  Lab Results   Component Value Date/Time    FIO2 INFORMATION NOT PROVIDED 08/03/2022 10:12 AM     Lab Results   Component Value Date/Time    SPECIAL RIGHT HAND 2ML 08/03/2022 02:22 AM     Lab Results   Component Value Date/Time    CULTURE NO GROWTH 1 DAY 08/03/2022 02:22 AM       Radiology:  CT HEAD WO CONTRAST    Result Date: 8/2/2022  No evidence for acute intracranial hemorrhage, territorial infarction or intracranial mass lesion. Mild chronic microangiopathic ischemic disease. Mild generalized volume loss. MRI BRAIN WO CONTRAST    Result Date: 8/3/2022  Chronic microvascular disease without acute intracranial abnormality. Chronic sinusitis most pronounced in the right maxillary sinus. Physical Examination:        General appearance:  alert, cooperative and no distress  Mental Status:  she is not oriented to person, place or time but is able to tell me shes in the hospital but cannot clarify which.    Lungs:  clear to auscultation bilaterally, normal effort  Heart:  regular rate and rhythm, no murmur  Abdomen:  soft, nontender, nondistended, normal bowel sounds, no masses, hepatomegaly, splenomegaly  Extremities:  no edema, redness, tenderness in the calves  Skin:  no gross lesions, rashes, induration    Assessment:        Hospital Problems             Last Modified POA    * (Principal) Dialysis disequilibrium syndrome 8/4/2022 Yes    Vancomycin resistant Enterococcus UTI 8/4/2022 Yes    Acute cystitis 8/4/2022 Yes    VRE infection (vancomycin resistant Enterococcus) 8/4/2022 Yes    Infection due to ESBL-producing Klebsiella pneumoniae 8/4/2022 Yes    ESRD (end stage renal disease) ([facility] [address] 8/4/2022 Yes    Encephalopathy 8/3/2022 Yes    Syncope and collapse 8/3/2022 Yes    Class 2 severe obesity due to excess calories with serious comorbidity and body mass index (BMI) of 35.0 to 35.9 in adult [facility]) 8/4/2022 Yes    Type 2 diabetes mellitus with hyperglycemia, with long-term current use of insulin ([facility] [address] 8/4/2022 Yes       Plan:        Toxic metabolic encephalopathy: 2/2 UTI+/-disequilibrium syndrome, and possible med effect from Zanaflex  and Gabapentin  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 Started on Zyvox and meropenem for 7 days until 8/10/2022. We will hold midline on discharge per infectious disease. Nephrology following, are holding off on dialysis to see patient response.   Diabetes mellitus type 2 with hyperglycemia: start home lantus 50 units BID lispro 10 units TID with correctional insulin monitor for hypoglycemia   Anemia of chronic inflammation: monitor, transfuse as needed  Obesity BMI of 37: Recommend weight loss lifestyle modification  Depression: hold Cymbalta trazodone and Buspar while on zyvox  Gout: Restart allopurinol  Labs, imaging, EKG reviewed  PT OT  Discussed with SAMEERA Kitchen DO  8/4/2022  5:49 PM

## 2022-08-04 NOTE — CONSULTS
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  Suspected to be Dialysis disequilibrium syndrome  Chronic UTI - VRE and ESBL kleb     Other:  ESRD  Type 2 DM  ERICK  Chronic hypoxic respiratory failure  anemia  Discussion / summary of stay / plan of care     Recommendations   Start zyvox and meropenem for -7 days till 8/10  Will need midline on discharge - ordered  Nephrology - pending alternative dialysis option  We will get the carotid doppler to look for any carotid stenosis    Infection Control Recommendations   [place] Precautions  Contact Isolation       Antimicrobial Stewardship Recommendations   Simplification of therapy  Targeted therapy    History of Present Illness:   Initial history:  History obtained from chart review  Jenny Golden is a 57y.o.-year-old female with past medical history of ESRD on hemodialysis, type 2 diabetes, ERICK, chronic hypoxic respiratory failure, history of global amnesia occurring up during dialysis and taking 1 month to resolve. Patient was on dialysis on 8/2 and became confused at the end of the dialysis and was brought to the ER. In the ER patient was confused with global amnesia does not even know her name. Had no focal deficit other than global amnesia. Strength was intact in all the extremities with intact sensation. Patient denied headache, chest pain or shortness of breath or abdominal pain. In the ED patient was hemodynamically stable. Creatinine 1.6, CRP 9.8,  Blood sugar 402, WBC 6.8, urine culture positive for VRE sensitive to linezolid. ESR 87. Negative blood culture. MRI brain showed chronic microvascular disease without any acute intracranial abnormality. EEG is negative for any epileptiform activity.    Echo showed ejection fraction of 60%. Patient was admitted to the observation unit and ID was consulted for VRE positive urine culture. Interval changes  8/4/2022   Patient Vitals for the past 8 hrs:   BP Temp Temp src Pulse Resp SpO2   08/04/22 0734 136/64 98.2 °F (36.8 °C) Oral 88 18 95 %   Current evaluation  Patient does not remember her birthdate, her family members, she does not remember what happened 5 minutes before. Has everything written down in the paper. Patient is alert, not in acute distress. States having burning sensation while urination but no pain in abdomen, no urgency or frequency  Afebrile  Urine output yesterday was 800 mL-family meeting with nephrology to discuss on further dialysis plan - possibly peritoneal dialysis tomorrow  Urine culture was positive for VRE  Echo 8/3-ejection fraction 60%-otherwise unremarkable    Summary of relevant labs:  Labs:  Creatinine-1.6-2.13-3.10  BUN -13-20-29  Blood sugar today 402  CRP 9.8  ESR 87  Toxic urinary screen was negative for benzodiazepine methadone and cannabinoid  Micro:  Urine culture 8/3-positive for VRE-sensitive to linezolid  Blood culture 8/3 - negative so far  Imaging:    MRI brain 8/3-  Impression   Chronic microvascular disease without acute intracranial abnormality. Chronic sinusitis most pronounced in the right maxillary sinus. I have personally reviewed the past medical history, past surgical history, medications, social history, and family history, and I haveupdated the database accordingly. Allergies:   Latex and Metformin and related     Review of Systems:     Review of Systems   Constitutional:  Negative for chills and fever. HENT:  Negative for ear pain, rhinorrhea and sinus pain. Eyes:  Negative for pain and itching. Respiratory:  Negative for cough, choking, chest tightness and shortness of breath. Cardiovascular:  Negative for chest pain and leg swelling.    Gastrointestinal:  Negative for abdominal distention, abdominal pain, constipation and diarrhea. Endocrine: Negative. Genitourinary:  Positive for dysuria. Negative for difficulty urinating, enuresis, frequency, hematuria and urgency. Musculoskeletal:  Negative for arthralgias, back pain and joint swelling. Skin:  Negative for color change and pallor. Neurological:  Negative for dizziness, facial asymmetry and headaches. Hematological: Negative. Psychiatric/Behavioral:  Negative for agitation and behavioral problems. Physical Examination :       Physical Exam  Constitutional:       General: She is not in acute distress. Appearance: Normal appearance. She is obese. She is not ill-appearing or toxic-appearing. HENT:      Right Ear: External ear normal.      Left Ear: External ear normal.      Nose: Nose normal.      Mouth/Throat:      Mouth: Mucous membranes are moist.   Eyes:      Pupils: Pupils are equal, round, and reactive to light. Cardiovascular:      Rate and Rhythm: Normal rate. Pulses: Normal pulses. Heart sounds: Normal heart sounds. Pulmonary:      Effort: Pulmonary effort is normal.      Breath sounds: Normal breath sounds. Abdominal:      Palpations: Abdomen is soft. Tenderness: There is no abdominal tenderness. There is no guarding or rebound. Musculoskeletal:         General: No swelling, tenderness or deformity. Cervical back: Normal range of motion. Skin:     Coloration: Skin is not jaundiced. Findings: No bruising. Neurological:      General: No focal deficit present. Cranial Nerves: No cranial nerve deficit. Sensory: No sensory deficit. Comments: Has global memory loss. Does not know about the place, time or person. Not in distress. Motor and sensory is normal.    Psychiatric:         Behavior: Behavior normal.         Thought Content: Thought content normal.       Past Medical History:   No past medical history on file.     Past Surgical  History:   No past surgical history on file. Medications:      insulin glargine  50 Units SubCUTAneous BID    insulin lispro  0-4 Units SubCUTAneous TID WC    insulin lispro  0-4 Units SubCUTAneous Nightly    cefTRIAXone (ROCEPHIN) IV  1,000 mg IntraVENous Q24H    heparin (porcine)  5,000 Units SubCUTAneous 3 times per day    aspirin  81 mg Oral Daily    atorvastatin  10 mg Oral Daily    busPIRone  10 mg Oral TID    carvedilol  3.125 mg Oral BID WC    furosemide  80 mg Oral BID    sodium bicarbonate  650 mg Oral TID    tamsulosin  0.4 mg Oral Daily    traZODone  50 mg Oral Nightly    sodium chloride flush  5-40 mL IntraVENous 2 times per day       Social History:     Social History     Socioeconomic History    Marital status: Single     Spouse name: Not on file    Number of children: Not on file    Years of education: Not on file    Highest education level: Not on file   Occupational History    Not on file   Tobacco Use    Smoking status: Not on file    Smokeless tobacco: Not on file   Substance and Sexual Activity    Alcohol use: Not on file    Drug use: Not on file    Sexual activity: Not on file   Other Topics Concern    Not on file   Social History Narrative    Not on file     Social Determinants of Health     Financial Resource Strain: Not on file   Food Insecurity: Not on file   Transportation Needs: Not on file   Physical Activity: Not on file   Stress: Not on file   Social Connections: Not on file   Intimate Partner Violence: Not on file   Housing Stability: Not on file       Family History:   No family history on file.    Medical Decision Making:   I have independently reviewed/ordered the following labs:    CBC with Differential:   Recent Labs     08/02/22  1713 08/03/22  0454   WBC 6.8 6.4   HGB 9.9* 9.4*   HCT 30.2* 30.7*    220   LYMPHOPCT 13* 18*   MONOPCT 8 9     BMP:  Recent Labs     08/03/22  0222 08/03/22  0454 08/04/22  0949   NA  --  134* 136   K  --  3.6* 4.4   CL  --  99 99   CO2  --  24 23   BUN  --  20 29*   CREATININE --  2.13* 3.10*   MG 2.0  --   --      Hepatic Function Panel:   Recent Labs     08/02/22  1713   PROT 7.4   LABALBU 3.6   BILITOT 0.48   ALKPHOS 138*   ALT 14   AST 20     No results for input(s): RPR in the last 72 hours. No results for input(s): HIV in the last 72 hours. No results for input(s): BC in the last 72 hours. Lab Results   Component Value Date/Time    CREATININE 3.10 08/04/2022 09:49 AM    GLUCOSE 469 08/04/2022 09:49 AM       Detailed results: Thank you for allowing us to participate in the care of this patient. Please call with questions. This note is created with the assistance of a speech recognition program.  While intending to generate adocument that actually reflects the content of the visit, the document can still have some errors including those of syntax and sound a like substitutions which may escape proof reading. It such instances, actual meaningcan be extrapolated by contextual diversion. Pranav Rivas MD  Office: [phone]  Perfect serve / office [phone]         I have discussed the care of the patient, including pertinent history and exam findings,  with the resident. I have seen and examined the patient and the key elements of all parts of the encounter have been performed by me. I agree with the assessment, plan and orders as documented by the resident.     Cindy Caruso, Infectious Diseases

## 2022-08-04 NOTE — PROGRESS NOTES
Assumed care of patient at this time, no changes from previous RN assessment, will continue to monitor

## 2022-08-04 NOTE — PLAN OF CARE
Problem: ABCDS Injury Assessment  Goal: Absence of physical injury  8/4/2022 0339 by Juan Antonio Andino  Outcome: Progressing  Flowsheets (Taken 8/4/2022 0338)  Absence of Physical Injury: Implement safety measures based on patient assessment  8/3/2022 1901 by Kitty Anton RN  Outcome: Progressing  Flowsheets (Taken 8/3/2022 1110)  Absence of Physical Injury: Implement safety measures based on patient assessment     Problem: Safety - Adult  Goal: Free from fall injury  8/4/2022 0339 by Juan Antonio Andino  Outcome: Progressing  Flowsheets (Taken 8/4/2022 0338)  Free From Fall Injury: Instruct family/caregiver on patient safety  8/3/2022 1901 by Kitty Anton RN  Outcome: Progressing     Problem: Skin/Tissue Integrity  Goal: Absence of new skin breakdown  Description: 1. Monitor for areas of redness and/or skin breakdown  2. Assess vascular access sites hourly  3. Every 4-6 hours minimum:  Change oxygen saturation probe site  4. Every 4-6 hours:  If on nasal continuous positive airway pressure, respiratory therapy assess nares and determine need for appliance change or resting period.   8/4/2022 0339 by Juan Antonio Andino  Outcome: Progressing  8/3/2022 1901 by Kitty Anton RN  Outcome: Progressing

## 2022-08-04 NOTE — CARE COORDINATION
Transitional Planning:  Received vm from Karen at [facility]. They can accept. CM called pts choice #1 [facility]. They cannot accept due to no [employer] contract. Spoke with pt re: choice of home vs SNF. She ask I call her sister. Spoke with sister Gilda Nieves. Sister is still confused. Agrees on [facility] if pt cannot go home. Call to Ciara at [facility] to have precerted started.   Pt will need rapid COVID test.

## 2022-08-05 PROBLEM — G93.41 METABOLIC ENCEPHALOPATHY: Status: ACTIVE | Noted: 2022-08-05

## 2022-08-05 LAB
ABSOLUTE EOS #: 0.15 K/UL (ref 0–0.44)
ABSOLUTE IMMATURE GRANULOCYTE: 0.03 K/UL (ref 0–0.3)
ABSOLUTE LYMPH #: 0.85 K/UL (ref 1.1–3.7)
ABSOLUTE MONO #: 0.42 K/UL (ref 0.1–1.2)
ANION GAP SERPL CALCULATED.3IONS-SCNC: 13 MMOL/L (ref 9–17)
BASOPHILS # BLD: 0 % (ref 0–2)
BASOPHILS ABSOLUTE: <0.03 K/UL (ref 0–0.2)
BUN BLDV-MCNC: 34 MG/DL (ref 8–23)
CALCIUM SERPL-MCNC: 8.9 MG/DL (ref 8.6–10.4)
CARBOXYHEMOGLOBIN: 2 % (ref 0–5)
CHLORIDE BLD-SCNC: 102 MMOL/L (ref 98–107)
CO2: 22 MMOL/L (ref 20–31)
CREAT SERPL-MCNC: 3.2 MG/DL (ref 0.5–0.9)
CULTURE: ABNORMAL
CULTURE: ABNORMAL
EOSINOPHILS RELATIVE PERCENT: 3 % (ref 1–4)
FIO2: ABNORMAL
GFR AFRICAN AMERICAN: 18 ML/MIN
GFR NON-AFRICAN AMERICAN: 15 ML/MIN
GFR SERPL CREATININE-BSD FRML MDRD: ABNORMAL ML/MIN/{1.73_M2}
GLUCOSE BLD-MCNC: 143 MG/DL (ref 65–105)
GLUCOSE BLD-MCNC: 191 MG/DL (ref 65–105)
GLUCOSE BLD-MCNC: 291 MG/DL (ref 65–105)
GLUCOSE BLD-MCNC: 295 MG/DL (ref 65–105)
GLUCOSE BLD-MCNC: 323 MG/DL (ref 70–99)
HCO3 VENOUS: 23.1 MMOL/L (ref 24–30)
HCT VFR BLD CALC: 27.8 % (ref 36.3–47.1)
HEMOGLOBIN: 8.7 G/DL (ref 11.9–15.1)
IMMATURE GRANULOCYTES: 1 %
INTERVENTION: NORMAL
LYMPHOCYTES # BLD: 15 % (ref 24–43)
MCH RBC QN AUTO: 32.2 PG (ref 25.2–33.5)
MCHC RBC AUTO-ENTMCNC: 31.3 G/DL (ref 28.4–34.8)
MCV RBC AUTO: 103 FL (ref 82.6–102.9)
MONOCYTES # BLD: 7 % (ref 3–12)
NEGATIVE BASE EXCESS, VEN: 0.5 MMOL/L (ref 0–2)
NRBC AUTOMATED: 0 PER 100 WBC
O2 SAT, VEN: 73.8 % (ref 60–85)
PATIENT TEMP: 37
PCO2, VEN: 36 (ref 39–55)
PDW BLD-RTO: 17.2 % (ref 11.8–14.4)
PH VENOUS: 7.42 (ref 7.32–7.42)
PHOSPHORUS: 3.3 MG/DL (ref 2.6–4.5)
PLATELET # BLD: 198 K/UL (ref 138–453)
PMV BLD AUTO: 10.1 FL (ref 8.1–13.5)
PO2, VEN: 42.3 (ref 30–50)
POTASSIUM SERPL-SCNC: 3.9 MMOL/L (ref 3.7–5.3)
RBC # BLD: 2.7 M/UL (ref 3.95–5.11)
RBC # BLD: ABNORMAL 10*6/UL
SEG NEUTROPHILS: 74 % (ref 36–65)
SEGMENTED NEUTROPHILS ABSOLUTE COUNT: 4.19 K/UL (ref 1.5–8.1)
SODIUM BLD-SCNC: 137 MMOL/L (ref 135–144)
SPECIMEN DESCRIPTION: ABNORMAL
WBC # BLD: 5.7 K/UL (ref 3.5–11.3)

## 2022-08-05 PROCEDURE — G0378 HOSPITAL OBSERVATION PER HR: HCPCS

## 2022-08-05 PROCEDURE — 6370000000 HC RX 637 (ALT 250 FOR IP): Performed by: FAMILY MEDICINE

## 2022-08-05 PROCEDURE — 99232 SBSQ HOSP IP/OBS MODERATE 35: CPT | Performed by: INTERNAL MEDICINE

## 2022-08-05 PROCEDURE — 6360000002 HC RX W HCPCS: Performed by: FAMILY MEDICINE

## 2022-08-05 PROCEDURE — 82805 BLOOD GASES W/O2 SATURATION: CPT

## 2022-08-05 PROCEDURE — 84100 ASSAY OF PHOSPHORUS: CPT

## 2022-08-05 PROCEDURE — 1200000000 HC SEMI PRIVATE

## 2022-08-05 PROCEDURE — 6370000000 HC RX 637 (ALT 250 FOR IP): Performed by: INTERNAL MEDICINE

## 2022-08-05 PROCEDURE — 36415 COLL VENOUS BLD VENIPUNCTURE: CPT

## 2022-08-05 PROCEDURE — C1751 CATH, INF, PER/CENT/MIDLINE: HCPCS

## 2022-08-05 PROCEDURE — 2580000003 HC RX 258: Performed by: INTERNAL MEDICINE

## 2022-08-05 PROCEDURE — 97530 THERAPEUTIC ACTIVITIES: CPT

## 2022-08-05 PROCEDURE — 87635 SARS-COV-2 COVID-19 AMP PRB: CPT

## 2022-08-05 PROCEDURE — 82947 ASSAY GLUCOSE BLOOD QUANT: CPT

## 2022-08-05 PROCEDURE — 96372 THER/PROPH/DIAG INJ SC/IM: CPT

## 2022-08-05 PROCEDURE — 80048 BASIC METABOLIC PNL TOTAL CA: CPT

## 2022-08-05 PROCEDURE — 2580000003 HC RX 258: Performed by: NURSE PRACTITIONER

## 2022-08-05 PROCEDURE — 76937 US GUIDE VASCULAR ACCESS: CPT

## 2022-08-05 PROCEDURE — 6360000002 HC RX W HCPCS: Performed by: INTERNAL MEDICINE

## 2022-08-05 PROCEDURE — 6370000000 HC RX 637 (ALT 250 FOR IP): Performed by: NURSE PRACTITIONER

## 2022-08-05 PROCEDURE — 85025 COMPLETE CBC W/AUTO DIFF WBC: CPT

## 2022-08-05 PROCEDURE — 99232 SBSQ HOSP IP/OBS MODERATE 35: CPT | Performed by: NURSE PRACTITIONER

## 2022-08-05 PROCEDURE — 05HD33Z INSERTION OF INFUSION DEVICE INTO RIGHT CEPHALIC VEIN, PERCUTANEOUS APPROACH: ICD-10-PCS | Performed by: STUDENT IN AN ORGANIZED HEALTH CARE EDUCATION/TRAINING PROGRAM

## 2022-08-05 RX ORDER — LINEZOLID 600 MG/1
600 TABLET, FILM COATED ORAL EVERY 12 HOURS SCHEDULED
Qty: 12 TABLET | Refills: 0 | Status: SHIPPED | OUTPATIENT
Start: 2022-08-05 | End: 2022-08-11

## 2022-08-05 RX ORDER — INSULIN GLARGINE 100 [IU]/ML
20 INJECTION, SOLUTION SUBCUTANEOUS ONCE
Status: COMPLETED | OUTPATIENT
Start: 2022-08-05 | End: 2022-08-05

## 2022-08-05 RX ORDER — INSULIN GLARGINE 100 [IU]/ML
55 INJECTION, SOLUTION SUBCUTANEOUS 2 TIMES DAILY
Status: DISCONTINUED | OUTPATIENT
Start: 2022-08-05 | End: 2022-08-09 | Stop reason: HOSPADM

## 2022-08-05 RX ADMIN — HEPARIN SODIUM 5000 UNITS: 5000 INJECTION INTRAVENOUS; SUBCUTANEOUS at 13:14

## 2022-08-05 RX ADMIN — INSULIN LISPRO 10 UNITS: 100 INJECTION, SOLUTION INTRAVENOUS; SUBCUTANEOUS at 16:47

## 2022-08-05 RX ADMIN — FUROSEMIDE 80 MG: 40 TABLET ORAL at 20:39

## 2022-08-05 RX ADMIN — INSULIN GLARGINE 50 UNITS: 100 INJECTION, SOLUTION SUBCUTANEOUS at 08:50

## 2022-08-05 RX ADMIN — HEPARIN SODIUM 5000 UNITS: 5000 INJECTION INTRAVENOUS; SUBCUTANEOUS at 06:25

## 2022-08-05 RX ADMIN — FEBUXOSTAT 40 MG: 40 TABLET ORAL at 08:39

## 2022-08-05 RX ADMIN — CARVEDILOL 3.12 MG: 3.12 TABLET, FILM COATED ORAL at 08:37

## 2022-08-05 RX ADMIN — LINEZOLID 600 MG: 600 TABLET, FILM COATED ORAL at 20:39

## 2022-08-05 RX ADMIN — PANTOPRAZOLE SODIUM 40 MG: 40 TABLET, DELAYED RELEASE ORAL at 08:36

## 2022-08-05 RX ADMIN — INSULIN LISPRO 10 UNITS: 100 INJECTION, SOLUTION INTRAVENOUS; SUBCUTANEOUS at 13:05

## 2022-08-05 RX ADMIN — TAMSULOSIN HYDROCHLORIDE 0.4 MG: 0.4 CAPSULE ORAL at 08:39

## 2022-08-05 RX ADMIN — SODIUM CHLORIDE, PRESERVATIVE FREE 10 ML: 5 INJECTION INTRAVENOUS at 20:46

## 2022-08-05 RX ADMIN — SODIUM CHLORIDE, PRESERVATIVE FREE 10 ML: 5 INJECTION INTRAVENOUS at 08:40

## 2022-08-05 RX ADMIN — FUROSEMIDE 80 MG: 40 TABLET ORAL at 08:36

## 2022-08-05 RX ADMIN — SODIUM BICARBONATE 650 MG: 648 TABLET ORAL at 20:38

## 2022-08-05 RX ADMIN — MEROPENEM 1000 MG: 1 INJECTION, POWDER, FOR SOLUTION INTRAVENOUS at 16:41

## 2022-08-05 RX ADMIN — INSULIN LISPRO 2 UNITS: 100 INJECTION, SOLUTION INTRAVENOUS; SUBCUTANEOUS at 08:49

## 2022-08-05 RX ADMIN — INSULIN LISPRO 2 UNITS: 100 INJECTION, SOLUTION INTRAVENOUS; SUBCUTANEOUS at 13:06

## 2022-08-05 RX ADMIN — HEPARIN SODIUM 5000 UNITS: 5000 INJECTION INTRAVENOUS; SUBCUTANEOUS at 20:39

## 2022-08-05 RX ADMIN — ATORVASTATIN CALCIUM 10 MG: 10 TABLET, FILM COATED ORAL at 08:37

## 2022-08-05 RX ADMIN — LINEZOLID 600 MG: 600 TABLET, FILM COATED ORAL at 08:37

## 2022-08-05 RX ADMIN — INSULIN GLARGINE 20 UNITS: 100 INJECTION, SOLUTION SUBCUTANEOUS at 20:45

## 2022-08-05 RX ADMIN — RANOLAZINE 1000 MG: 500 TABLET, FILM COATED, EXTENDED RELEASE ORAL at 08:37

## 2022-08-05 RX ADMIN — SODIUM BICARBONATE 650 MG: 648 TABLET ORAL at 08:37

## 2022-08-05 RX ADMIN — CARVEDILOL 3.12 MG: 3.12 TABLET, FILM COATED ORAL at 16:45

## 2022-08-05 RX ADMIN — SODIUM BICARBONATE 650 MG: 648 TABLET ORAL at 14:00

## 2022-08-05 RX ADMIN — RANOLAZINE 1000 MG: 500 TABLET, FILM COATED, EXTENDED RELEASE ORAL at 20:38

## 2022-08-05 RX ADMIN — DOCUSATE SODIUM 100 MG: 100 CAPSULE ORAL at 08:36

## 2022-08-05 RX ADMIN — ASPIRIN 81 MG: 81 TABLET, COATED ORAL at 08:36

## 2022-08-05 RX ADMIN — DOCUSATE SODIUM 100 MG: 100 CAPSULE ORAL at 20:39

## 2022-08-05 ASSESSMENT — ENCOUNTER SYMPTOMS
DIARRHEA: 0
SHORTNESS OF BREATH: 0
EYE PAIN: 0
ABDOMINAL PAIN: 0
COLOR CHANGE: 0
ABDOMINAL DISTENTION: 0
SINUS PAIN: 0
CHOKING: 0
EYE ITCHING: 0
COUGH: 0
BACK PAIN: 0
CONSTIPATION: 0
RHINORRHEA: 0
CHEST TIGHTNESS: 0

## 2022-08-05 NOTE — CARE COORDINATION
Transitional Planning:  PS MD for COVID test needed prior to placement. Informed nurse Corey Draper not to swab for COVID until I know we have precert today.

## 2022-08-05 NOTE — PROGRESS NOTES
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  Suspected to be Dialysis disequilibrium syndrome  Acute on Chronic cystitis - VRE and ESBL kleb     Other:  ESRD  Type 2 DM  ERICK  Chronic hypoxic respiratory failure  anemia  Discussion / summary of stay / plan of care     Recommendations   Start zyvox and meropenem for -7 days   Midline ordered- reconsiled for DC  Nephrology - pending alternative dialysis option  carotid doppler - < 50 % carotid stenosis, hence neg    Infection Control Recommendations   [place] Precautions  Contact Isolation       Antimicrobial Stewardship Recommendations   Simplification of therapy  Targeted therapy    History of Present Illness:   Initial history:  History obtained from chart review  Yoselyn Dugan is a 57y.o.-year-old female with past medical history of ESRD on hemodialysis, type 2 diabetes, ERICK, chronic hypoxic respiratory failure, history of global amnesia occurring up during dialysis and taking 1 month to resolve. Patient was on dialysis on 8/2 and became confused at the end of the dialysis and was brought to the ER. In the ER patient was confused with global amnesia does not even know her name. Had no focal deficit other than global amnesia. Strength was intact in all the extremities with intact sensation. Patient denied headache, chest pain or shortness of breath or abdominal pain. In the ED patient was hemodynamically stable. Creatinine 1.6, CRP 9.8,  Blood sugar 402, WBC 6.8, urine culture positive for VRE sensitive to linezolid. ESR 87. Negative blood culture. MRI brain showed chronic microvascular disease without any acute intracranial abnormality. EEG is negative for any epileptiform activity. Echo showed ejection fraction of 60%. Patient was admitted to the observation unit and ID was consulted for VRE positive urine culture. Interval changes  8/5/2022   Patient Vitals for the past 8 hrs:   BP Temp Temp src Pulse Resp SpO2 Weight   08/05/22 0823 131/68 97.5 °F (36.4 °C) Temporal 84 18 95 % --   08/05/22 0600 -- -- -- -- -- -- 227 lb (103 kg)     Current evaluation  Patient does not remember her birthdate, her family members, she does not remember what happened 5 minutes before. Has everything written down in the paper. Patient is alert, not in acute distress. States having burning sensation while urination but no pain in abdomen, no urgency or frequency  Afebrile  Urine output yesterday was 800 mL-family meeting with nephrology to discuss on further dialysis plan - possibly peritoneal dialysis tomorrow  Urine culture was positive for VRE  Echo 8/3-ejection fraction 60%-otherwise unremarkable    Summary of relevant labs:  Labs:  Creatinine-1.6-2.13-3.10  BUN -13-20-29  Blood sugar today 402  CRP 9.8  ESR 87  Toxic urinary screen was negative for benzodiazepine methadone and cannabinoid  Micro:  Urine culture 8/3-positive for VRE-sensitive to linezolid  Blood culture 8/3 - negative so far  Imaging:    MRI brain 8/3-  Impression   Chronic microvascular disease without acute intracranial abnormality. Chronic sinusitis most pronounced in the right maxillary sinus. I have personally reviewed the past medical history, past surgical history, medications, social history, and family history, and I haveupdated the database accordingly. Allergies:   Latex and Metformin and related     Review of Systems:     Review of Systems   Constitutional:  Negative for chills and fever. HENT:  Negative for ear pain, rhinorrhea and sinus pain. Eyes:  Negative for pain and itching. Respiratory:  Negative for cough, choking, chest tightness and shortness of breath. Cardiovascular:  Negative for chest pain and leg swelling. Gastrointestinal:  Negative for abdominal distention, abdominal pain, constipation and diarrhea. Endocrine: Negative. Genitourinary:  Positive for dysuria. Negative for difficulty urinating, enuresis, frequency, hematuria and urgency. Musculoskeletal:  Negative for arthralgias, back pain and joint swelling. Skin:  Negative for color change and pallor. Neurological:  Negative for dizziness, facial asymmetry and headaches. Hematological: Negative. Psychiatric/Behavioral:  Negative for agitation and behavioral problems. Physical Examination :       Physical Exam  Constitutional:       General: She is not in acute distress. Appearance: Normal appearance. She is obese. She is not ill-appearing or toxic-appearing. HENT:      Right Ear: External ear normal.      Left Ear: External ear normal.      Nose: Nose normal.      Mouth/Throat:      Mouth: Mucous membranes are moist.   Eyes:      Pupils: Pupils are equal, round, and reactive to light. Cardiovascular:      Rate and Rhythm: Normal rate. Pulses: Normal pulses. Heart sounds: Normal heart sounds. Pulmonary:      Effort: Pulmonary effort is normal.      Breath sounds: Normal breath sounds. Abdominal:      Palpations: Abdomen is soft. Tenderness: There is no abdominal tenderness. There is no guarding or rebound. Musculoskeletal:         General: No swelling, tenderness or deformity. Cervical back: Normal range of motion. Skin:     Coloration: Skin is not jaundiced. Findings: No bruising. Neurological:      General: No focal deficit present. Cranial Nerves: No cranial nerve deficit. Sensory: No sensory deficit. Comments: Has global memory loss. Does not know about the place, time or person. Not in distress. Motor and sensory is normal.    Psychiatric:         Behavior: Behavior normal.         Thought Content:  Thought content normal.       Past Medical History:   No past medical history on file.    Past Surgical  History:   No past surgical history on file. Medications:      insulin glargine  50 Units SubCUTAneous BID    linezolid  600 mg Oral 2 times per day    meropenem  1,000 mg IntraVENous Q24H    insulin lispro  10 Units SubCUTAneous TID WC    docusate sodium  100 mg Oral BID    [Held by provider] DULoxetine  30 mg Oral Daily    febuxostat  40 mg Oral Daily    pantoprazole  40 mg Oral Daily    ranolazine  1,000 mg Oral BID    insulin lispro  0-4 Units SubCUTAneous TID WC    insulin lispro  0-4 Units SubCUTAneous Nightly    heparin (porcine)  5,000 Units SubCUTAneous 3 times per day    aspirin  81 mg Oral Daily    atorvastatin  10 mg Oral Daily    [Held by provider] busPIRone  10 mg Oral TID    carvedilol  3.125 mg Oral BID WC    furosemide  80 mg Oral BID    sodium bicarbonate  650 mg Oral TID    tamsulosin  0.4 mg Oral Daily    [Held by provider] traZODone  50 mg Oral Nightly    sodium chloride flush  5-40 mL IntraVENous 2 times per day       Social History:     Social History     Socioeconomic History    Marital status: Single     Spouse name: Not on file    Number of children: Not on file    Years of education: Not on file    Highest education level: Not on file   Occupational History    Not on file   Tobacco Use    Smoking status: Not on file    Smokeless tobacco: Not on file   Substance and Sexual Activity    Alcohol use: Not on file    Drug use: Not on file    Sexual activity: Not on file   Other Topics Concern    Not on file   Social History Narrative    Not on file     Social Determinants of Health     Financial Resource Strain: Not on file   Food Insecurity: Not on file   Transportation Needs: Not on file   Physical Activity: Not on file   Stress: Not on file   Social Connections: Not on file   Intimate Partner Violence: Not on file   Housing Stability: Not on file       Family History:   No family history on file.    Medical Decision Making:   I have independently reviewed/ordered the following labs:    CBC with Differential:   Recent Labs     08/03/22  0454 08/05/22  1050   WBC 6.4 5.7   HGB 9.4* 8.7*   HCT 30.7* 27.8*    198   LYMPHOPCT 18* 15*   MONOPCT 9 7       BMP:  Recent Labs     08/03/22  0222 08/03/22  0454 08/04/22  0949 08/05/22  1050   NA  --    < > 136 137   K  --    < > 4.4 3.9   CL  --    < > 99 102   CO2  --    < > 23 22   BUN  --    < > 29* 34*   CREATININE  --    < > 3.10* 3.20*   MG 2.0  --   --   --     < > = values in this interval not displayed. Hepatic Function Panel:   Recent Labs     08/02/22  1713   PROT 7.4   LABALBU 3.6   BILITOT 0.48   ALKPHOS 138*   ALT 14   AST 20       No results for input(s): RPR in the last 72 hours. No results for input(s): HIV in the last 72 hours. No results for input(s): BC in the last 72 hours. Lab Results   Component Value Date/Time    CREATININE 3.20 08/05/2022 10:50 AM    GLUCOSE 323 08/05/2022 10:50 AM       Detailed results: Thank you for allowing us to participate in the care of this patient. Please call with questions. This note is created with the assistance of a speech recognition program.  While intending to generate adocument that actually reflects the content of the visit, the document can still have some errors including those of syntax and sound a like substitutions which may escape proof reading. It such instances, actual meaningcan be extrapolated by contextual diversion. Dorothy Light MD  Office: [phone]  Perfect serve / office [phone]         I have discussed the care of the patient, including pertinent history and exam findings,  with the resident. I have seen and examined the patient and the key elements of all parts of the encounter have been performed by me. I agree with the assessment, plan and orders as documented by the resident.     Cindy Caruso, Infectious Diseases

## 2022-08-05 NOTE — PROGRESS NOTES
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  Suspected to be Dialysis disequilibrium syndrome  Chronic UTI - VRE and ESBL kleb     Other:  ESRD  Type 2 DM  ERICK  Chronic hypoxic respiratory failure  anemia  Discussion / summary of stay / plan of care     Recommendations   zyvox and meropenem for -7 days till 8/10  Will need midline on discharge - ordered  Nephrology - pending alternative dialysis option  Carotid Doppler-less than 50% stenosis bilateral ICA    Infection Control Recommendations   [place] Precautions  Contact Isolation       Antimicrobial Stewardship Recommendations   Simplification of therapy  Targeted therapy    History of Present Illness:   Initial history:  History obtained from chart review  Yoselyn Dugan is a 57y.o.-year-old female with past medical history of ESRD on hemodialysis, type 2 diabetes, ERICK, chronic hypoxic respiratory failure, history of global amnesia occurring up during dialysis and taking 1 month to resolve. Patient was on dialysis on 8/2 and became confused at the end of the dialysis and was brought to the ER. In the ER patient was confused with global amnesia does not even know her name. Had no focal deficit other than global amnesia. Strength was intact in all the extremities with intact sensation. Patient denied headache, chest pain or shortness of breath or abdominal pain. In the ED patient was hemodynamically stable. Creatinine 1.6, CRP 9.8,  Blood sugar 402, WBC 6.8, urine culture positive for VRE sensitive to linezolid. ESR 87. Negative blood culture. MRI brain showed chronic microvascular disease without any acute intracranial abnormality. EEG is negative for any epileptiform activity. Echo showed ejection fraction of 60%. Patient was admitted to the observation unit and ID was consulted for VRE positive urine culture. Interval changes  8/5/2022   Patient Vitals for the past 8 hrs:   BP Temp Temp src Pulse Resp SpO2 Weight   08/05/22 0823 131/68 97.5 °F (36.4 °C) Temporal 84 18 95 % --   08/05/22 0600 -- -- -- -- -- -- 227 lb (103 kg)     Current evaluation  Patient's memory is the same as of yesterday-  does not remember her birthdate, her family members, she does not remember what happened 5 minutes before. Has everything written down in the paper. Patient is alert, not in acute distress. States having less burning sensation while urination than yesterday and no pain in abdomen, no urgency or frequency  Afebrile  Urine output yesterday was 300 mL-family meeting with nephrology to discuss on further dialysis plan - possibly peritoneal dialysis tomorrow  Urine culture was positive for VRE and klebsiella  Echo 8/3-ejection fraction 60%-otherwise unremarkable    Summary of relevant labs:  Labs:  Creatinine-1.6-2.13-3.10-3.2  BUN -13-20-29-34  Blood sugar 291  CRP 9.8  ESR 87  Toxic urinary screen was negative for benzodiazepine methadone and cannabinoid  TSH-1.89  Micro:  Urine culture 8/3-positive for VRE and urine culture 7/31-positive for Klebsiella  blood culture 8/3 - negative so far  Imaging:  Carotid Doppler 8/4-less than 50% stenosis of bilateral ICA    MRI brain 8/3-  Impression   Chronic microvascular disease without acute intracranial abnormality. Chronic sinusitis most pronounced in the right maxillary sinus. I have personally reviewed the past medical history, past surgical history, medications, social history, and family history, and I haveupdated the database accordingly. Allergies:   Latex and Metformin and related     Review of Systems:     Review of Systems   Constitutional:  Negative for activity change, chills and fever. HENT:  Negative for ear pain, rhinorrhea and sinus pain.     Eyes: Negative for pain and itching. Respiratory:  Negative for cough, choking, chest tightness and shortness of breath. Cardiovascular:  Negative for chest pain and leg swelling. Gastrointestinal:  Negative for abdominal distention, abdominal pain, constipation and diarrhea. Endocrine: Negative. Genitourinary:  Positive for dysuria. Negative for difficulty urinating, enuresis, frequency, hematuria and urgency. Musculoskeletal:  Negative for arthralgias, back pain and joint swelling. Skin:  Negative for color change and pallor. Neurological:  Negative for dizziness, syncope, facial asymmetry and headaches. Hematological: Negative. Psychiatric/Behavioral:  Negative for agitation and behavioral problems. Physical Examination :       Physical Exam  Constitutional:       General: She is not in acute distress. Appearance: Normal appearance. She is obese. She is not ill-appearing or toxic-appearing. HENT:      Right Ear: External ear normal.      Left Ear: External ear normal.      Nose: Nose normal.      Mouth/Throat:      Mouth: Mucous membranes are moist.   Eyes:      Pupils: Pupils are equal, round, and reactive to light. Cardiovascular:      Rate and Rhythm: Normal rate. Pulses: Normal pulses. Heart sounds: Normal heart sounds. Pulmonary:      Effort: Pulmonary effort is normal.      Breath sounds: Normal breath sounds. Abdominal:      Palpations: Abdomen is soft. Tenderness: There is no abdominal tenderness. There is no guarding or rebound. Musculoskeletal:         General: No swelling, tenderness or deformity. Cervical back: Normal range of motion. Skin:     Coloration: Skin is not jaundiced or pale. Findings: No bruising or erythema. Neurological:      General: No focal deficit present. Cranial Nerves: No cranial nerve deficit. Sensory: No sensory deficit. Comments: Has global memory loss. Does not know about the place, time or person. Not in distress. Motor and sensory is normal.    Psychiatric:         Behavior: Behavior normal.         Thought Content: Thought content normal.       Past Medical History:   No past medical history on file. Past Surgical  History:   No past surgical history on file.     Medications:      insulin glargine  50 Units SubCUTAneous BID    linezolid  600 mg Oral 2 times per day    meropenem  1,000 mg IntraVENous Q24H    insulin lispro  10 Units SubCUTAneous TID WC    docusate sodium  100 mg Oral BID    [Held by provider] DULoxetine  30 mg Oral Daily    febuxostat  40 mg Oral Daily    pantoprazole  40 mg Oral Daily    ranolazine  1,000 mg Oral BID    insulin lispro  0-4 Units SubCUTAneous TID WC    insulin lispro  0-4 Units SubCUTAneous Nightly    heparin (porcine)  5,000 Units SubCUTAneous 3 times per day    aspirin  81 mg Oral Daily    atorvastatin  10 mg Oral Daily    [Held by provider] busPIRone  10 mg Oral TID    carvedilol  3.125 mg Oral BID WC    furosemide  80 mg Oral BID    sodium bicarbonate  650 mg Oral TID    tamsulosin  0.4 mg Oral Daily    [Held by provider] traZODone  50 mg Oral Nightly    sodium chloride flush  5-40 mL IntraVENous 2 times per day       Social History:     Social History     Socioeconomic History    Marital status: Single     Spouse name: Not on file    Number of children: Not on file    Years of education: Not on file    Highest education level: Not on file   Occupational History    Not on file   Tobacco Use    Smoking status: Not on file    Smokeless tobacco: Not on file   Substance and Sexual Activity    Alcohol use: Not on file    Drug use: Not on file    Sexual activity: Not on file   Other Topics Concern    Not on file   Social History Narrative    Not on file     Social Determinants of Health     Financial Resource Strain: Not on file   Food Insecurity: Not on file   Transportation Needs: Not on file   Physical Activity: Not on file   Stress: Not on file   Social Connections: Not on file   Intimate Partner Violence: Not on file   Housing Stability: Not on file       Family History:   No family history on file. Medical Decision Making:   I have independently reviewed/ordered the following labs:    CBC with Differential:   Recent Labs     08/03/22  0454 08/05/22  1050   WBC 6.4 5.7   HGB 9.4* 8.7*   HCT 30.7* 27.8*    198   LYMPHOPCT 18* 15*   MONOPCT 9 7       BMP:  Recent Labs     08/03/22  0222 08/03/22  0454 08/04/22  0949 08/05/22  1050   NA  --    < > 136 137   K  --    < > 4.4 3.9   CL  --    < > 99 102   CO2  --    < > 23 22   BUN  --    < > 29* 34*   CREATININE  --    < > 3.10* 3.20*   MG 2.0  --   --   --     < > = values in this interval not displayed. Hepatic Function Panel:   Recent Labs     08/02/22  1713   PROT 7.4   LABALBU 3.6   BILITOT 0.48   ALKPHOS 138*   ALT 14   AST 20       No results for input(s): RPR in the last 72 hours. No results for input(s): HIV in the last 72 hours. No results for input(s): BC in the last 72 hours. Lab Results   Component Value Date/Time    CREATININE 3.20 08/05/2022 10:50 AM    GLUCOSE 323 08/05/2022 10:50 AM       Detailed results: Thank you for allowing us to participate in the care of this patient. Please call with questions. This note is created with the assistance of a speech recognition program.  While intending to generate adocument that actually reflects the content of the visit, the document can still have some errors including those of syntax and sound a like substitutions which may escape proof reading. It such instances, actual meaningcan be extrapolated by contextual diversion. Chencho Silver MD  Office: [phone]  Perfect serve / office [phone]         I have discussed the care of the patient, including pertinent history and exam findings,  with the resident. I have seen and examined the patient and the key elements of all parts of the encounter have been performed by me.   I agree with the assessment, plan and orders as documented by the resident.     Cindy Caruso, Infectious Diseases

## 2022-08-05 NOTE — PROGRESS NOTES
Physical Therapy  Facility/Department: [facility] ONC/MED SURG  Physical Therapy daily treatment note    Name: Jenny Golden  : 1960  MRN: [id number]  Date of Service: 2022    Discharge Recommendations:  Patient would benefit from continued therapy after discharge   PT Equipment Recommendations  Equipment Needed: Yes  Mobility Devices: Ronquillo Gayer: Rolling      Patient Diagnosis(es): The encounter diagnosis was Disorientation. Past Medical History:  has no past medical history on file. Past Surgical History:  has no past surgical history on file. Assessment   Body Structures, Functions, Activity Limitations Requiring Skilled Therapeutic Intervention: Decreased functional mobility ; Decreased cognition;Decreased endurance;Decreased ROM; Decreased strength;Decreased balance;Decreased posture  Assessment: Pt amb 5 ft with RW and CGA. Pt unsafe to return to prior living arrangments due to significant confusion.  Recommend contiuned PT after d/c to address deficits  Therapy Prognosis: Good  Activity Tolerance  Activity Tolerance: Treatment limited secondary to decreased cognition     Plan   Plan  Plan:  (5x)  Current Treatment Recommendations: Strengthening, Balance training, ROM, Gait training, Equipment evaluation, education, & procurement, Stair training, Functional mobility training, Transfer training, Home exercise program, Endurance training, Safety education & training, Patient/Caregiver education & training  Safety Devices  Type of Devices: Nurse notified, Gait belt, Patient at risk for falls, Call light within reach, Left in bed  Restraints  Restraints Initially in Place: No     Restrictions  Restrictions/Precautions  Restrictions/Precautions: General Precautions  Required Braces or Orthoses?: No  Position Activity Restriction  Other position/activity restrictions: up with A     Subjective   General  Patient assessed for rehabilitation services?: Yes  Response To Previous Treatment: Patient with no complaints from previous session. Family / Caregiver Present: No  Follows Commands: Within Functional Limits  Subjective  Subjective: Pt resting in recliner upon arrival, agreeable to PT with encouragment. Reports not feeling well this date.  Very confused throughout        Cognition   Orientation  Overall Orientation Status: Impaired  Orientation Level: Oriented to place;Oriented to person;Disoriented to time;Disoriented to situation (Pt aware she is in hospital and knows to look at whiteboard for date, otherwise disoriented)     Objective      Bed mobility  Rolling to Left: Stand by assistance  Supine to Sit: Unable to assess (up to recliner upon arrival)  Sit to Supine: Stand by assistance  Bed Mobility Comments: returned to supine for PICC line  Transfers  Sit to Stand: Contact guard assistance  Stand to sit: Contact guard assistance  Comment: cues for hand placement  Ambulation  Surface: level tile  Assistance: Contact guard assistance  Quality of Gait: wide ELISABET, flexed posture  Distance: 5 ft bed to chair  Comments: Pt refused further mobility stating \"i just dont feel good today\"  More Ambulation?: No     Balance  Posture: Fair  Sitting - Static: Good;-  Sitting - Dynamic: Fair;+  Standing - Static: Fair  Standing - Dynamic: Fair;-  Comments: standing balance assessed w/ RW   Exercise  Deferred due to pt getting PICC line      AM-PAC Score  AM-PAC Inpatient Mobility Raw Score : 19 (08/05/22 0958)  AM-PAC Inpatient T-Scale Score : 45.44 (08/05/22 0958)  Mobility Inpatient CMS 0-100% Score: 41.77 (08/05/22 0958)  Mobility Inpatient CMS G-Code Modifier : CK (08/05/22 0958)     Goals  Short Term Goals  Time Frame for Short term goals: 14  Short term goal 1: Pt to perform bed mobility and functional transfers independently  Short term goal 2: Pt to ambulate 300ft w/ RW independently  Short term goal 3: Demonstrate standing balance of good - to decrease fall risk  Short term goal 4: Tolerate 30 minutes of therapy to demo increased endurance  Patient Goals   Patient goals :  To go stronger       Education         Therapy Time   Individual Concurrent Group Co-treatment   Time In 0935         Time Out 2713         Minutes 9         Timed Code Treatment Minutes: 8 Minutes       Indy Child, PTA

## 2022-08-05 NOTE — PROGRESS NOTES
Nephrology Progress Note      SUBJECTIVE    Brief history: 68-year-old female past medical history of ESRD on hemodialysis, type 2 diabetes, ERICK, chronic hypoxic respiratory failure, history of global amnesia occurring up to 10 times during dialysis which is taken up to a month to resolve. Nephrology consulted for global amnesia during dialysis and ESRD on HD. Patient seen and examined. She remains hemodynamically stable, afebrile and saturating well on room air, she made another 300 mL urine today however no urine output documented on 2022. Her creatinine has increased to 3.2 from 1.6 on arrival but has somewhat leveled off today. Along with BUN increased to 34. VBG this morning showing slight respiratory alkalosis. Her sugars are better controlled today. She states her dysuria has improved. Patient has received a midline catheter for IV antibiotics for her UTI. Culture growing VRE and ESBL. She is on Zyvox and meropenem until 8/10/2022 per ID recommendations. OBJECTIVE      CURRENT TEMPERATURE:  Temp: 97.5 °F (36.4 °C)  MAXIMUM TEMPERATURE OVER 24HRS:  Temp (24hrs), Av °F (36.7 °C), Min:97.5 °F (36.4 °C), Max:98.4 °F (36.9 °C)    CURRENT RESPIRATORY RATE:  Resp: 18  CURRENT PULSE:  Heart Rate: 84  CURRENT BLOOD PRESSURE:  BP: 131/68  24HR BLOOD PRESSURE RANGE:  Systolic (61HKC), GB , Min:131 , ZUI:739   ; Diastolic (98DUV), CSV:00, Min:49, Max:68    24HR INTAKE/OUTPUT:    Intake/Output Summary (Last 24 hours) at 2022 1405  Last data filed at 2022 1312  Gross per 24 hour   Intake 114.96 ml   Output 301 ml   Net -186.04 ml         PHYSICAL EXAM      Physical Exam:  General:  Pleasant and cooperative. No apparent distress. HEENT:  Normocephalic, atraumatic, mucus membranes moist.   Neck:  Trachea midline. Chest:  Clear to auscultation bilaterally. No wheezes, rales, or rhonchi. CV:  Regular rate and rhythm.  No murmur, no gallops, no rubs  Abdomen:  Abdomen is soft, non-tender, non-distended, no rebound or guarding. Extremities:  No lower extremity edema or cyanosis, peripheral pulses intact  Neurological:  AAOx3. No focal deficits. Skin:  Warm and dry.   Edema}    CURRENT MEDICATIONS      insulin glargine (LANTUS) injection vial 50 Units, BID  linezolid (ZYVOX) tablet 600 mg, 2 times per day  meropenem (MERREM) 1,000 mg in sodium chloride 0.9 % 100 mL IVPB (mini-bag), Q24H  insulin lispro (HUMALOG) injection vial 10 Units, TID WC  docusate sodium (COLACE) capsule 100 mg, BID  [Held by provider] DULoxetine (CYMBALTA) extended release capsule 30 mg, Daily  febuxostat (ULORIC) tablet 40 mg, Daily  pantoprazole (PROTONIX) tablet 40 mg, Daily  ranolazine (RANEXA) extended release tablet 1,000 mg, BID  insulin lispro (HUMALOG) injection vial 0-4 Units, TID WC  insulin lispro (HUMALOG) injection vial 0-4 Units, Nightly  glucose chewable tablet 16 g, PRN  dextrose bolus 10% 125 mL, PRN   Or  dextrose bolus 10% 250 mL, PRN  glucagon (rDNA) injection 1 mg, PRN  dextrose 10 % infusion, Continuous PRN  heparin (porcine) injection 5,000 Units, 3 times per day  aspirin EC tablet 81 mg, Daily  atorvastatin (LIPITOR) tablet 10 mg, Daily  [Held by provider] busPIRone (BUSPAR) tablet 10 mg, TID  carvedilol (COREG) tablet 3.125 mg, BID WC  furosemide (LASIX) tablet 80 mg, BID  sodium bicarbonate tablet 650 mg, TID  tamsulosin (FLOMAX) capsule 0.4 mg, Daily  [Held by provider] traZODone (DESYREL) tablet 50 mg, Nightly  sodium chloride flush 0.9 % injection 5-40 mL, 2 times per day  sodium chloride flush 0.9 % injection 10 mL, PRN  0.9 % sodium chloride infusion, PRN  ondansetron (ZOFRAN-ODT) disintegrating tablet 4 mg, Q8H PRN   Or  ondansetron (ZOFRAN) injection 4 mg, Q6H PRN  polyethylene glycol (GLYCOLAX) packet 17 g, Daily PRN  acetaminophen (TYLENOL) tablet 650 mg, Q6H PRN   Or  acetaminophen (TYLENOL) suppository 650 mg, Q6H PRN        LABS      CBC:   Recent Labs     08/02/22  1718 08/03/22  0454 08/05/22  1050   WBC 6.8 6.4 5.7   RBC 3.05* 2.94* 2.70*   HGB 9.9* 9.4* 8.7*   HCT 30.2* 30.7* 27.8*   MCV 99.0 104.4* 103.0*   MCH 32.5 32.0 32.2   MCHC 32.8 30.6 31.3   RDW 17.3* 17.5* 17.2*    220 198   MPV 10.0 10.0 10.1        BMP:   Recent Labs     08/03/22  0454 08/04/22  0949 08/05/22  1050   * 136 137   K 3.6* 4.4 3.9   CL 99 99 102   CO2 24 23 22   BUN 20 29* 34*   CREATININE 2.13* 3.10* 3.20*   GLUCOSE 220* 469* 323*   CALCIUM 8.9 9.1 8.9        BNP:No results found for: BNP  PHOSPHORUS:    Recent Labs     08/05/22  1050   PHOS 3.3     MAGNESIUM:   Recent Labs     08/03/22  0222   MG 2.0       ALBUMIN:   Recent Labs     08/02/22  1713   LABALBU 3.6       IRON:  No results found for: IRON  IRON SATURATION:  No results found for: LABIRON  TIBC:  No results found for: TIBC  FERRITIN:  No results found for: FERRITIN  RUSSELL:   Lab Results   Component Value Date    RUSSELL NEGATIVE 08/03/2022       SPEP:   Lab Results   Component Value Date/Time    PROT 7.4 08/02/2022 05:13 PM     UPEP: No results found for: TPU   HEPBSAG:No results found for: HEPBSAG  HEPCAB:No results found for: HEPCAB  C3: No results found for: C3  C4: No results found for: C4  MPO ANCA: No results found for: MPO .   PR3 ANCA:  No results found for: PR3  URINE SODIUM:  No results found for: JASON   URINE POTASSIUM:  No results found for: KUR  URINE CHLORIDE:  No results found for: CLU  URINE PH:  No components found for: PO4U  URINE OSMOLARITY:  No results found for: OSMOU  URINE CREATININE:  No results found for: LABCREA  URINE EOSINOPHILS: No components found for: EOSU  URINE PROTEIN:  No results found for: TPU  URINALYSIS:  U/A:   Lab Results   Component Value Date/Time    NITRU NEGATIVE 08/02/2022 09:46 PM    COLORU Dark Yellow 08/02/2022 09:46 PM    PHUR 6.5 08/02/2022 09:46 PM    WBCUA TOO NUMEROUS TO COUNT 08/02/2022 09:46 PM    RBCUA 2 TO 5 08/02/2022 09:46 PM    BACTERIA MANY 08/02/2022 09:46 PM    SPECGRAV 1.016 08/02/2022 09:46 PM    LEUKOCYTESUR LARGE 08/02/2022 09:46 PM    UROBILINOGEN Normal 08/02/2022 09:46 PM    BILIRUBINUR NEGATIVE 08/02/2022 09:46 PM    GLUCOSEU NEGATIVE 08/02/2022 09:46 PM    KETUA NEGATIVE 08/02/2022 09:46 PM     ANTIGBM:No results found for: [address] as available. ASSESSMENT    #Acute encephalopathy, possible dialysis disequilibrium syndrome along with UTI  # ESRD on hemodialysis, unknown etiology, although lab data upon arrival inconsistent with the typical lab data 1 would see in an ESRD patient, as far as the creatinine is concerned. #UTI urine culture growing ESBL and VRE  #Type 2 diabetes, uncontrolled  #ERICK  #Chronic hypoxic respiratory failure    PLAN      -Continue to monitor kidney function while off dialysis. We will need to reach out to Dr. Irene Park to discuss further dialysis plan once out of the hospital, likely will need to be on an ongoing monitoring regimen vs possibly a short 2-2.5 hour dialysis twice a week. Family meeting at 2 PM on 8/6/2022  Continue strict I's and O's  -Patient got midline for IV antibiotics she is on Zyvox and Merrem per ID. Midline is in right arm  -Neurology following    Please do not hesitate to call with questions. Electronically signed by Jules Baires DO on 8/5/2022 at 2:05 PM     Attending Physician Statement  I have discussed the care of Luís Marshall, including pertinent history and exam findings with the resident/fellow. I have reviewed the key elements of all parts of the encounter with the resident/fellow and have edited the documentation as appropriate. I have seen and examined the patient with the resident/fellow and have edited the documentation as appropriate. I agree with the assessment and plan and status of the problem list as documented. Addiitionally I recommend watch labs and no current plans for dialysis tomorrow.     Grady Wang MD   Nephrology Attending Physician  [facility]  8/5/2022

## 2022-08-05 NOTE — PROCEDURES
Product type Bard 3 Fr single lumen PowerMidine    Benefits include stable long term intravenous access. Risks include failure to obtain the desired result(s) of the procedure, discomfort, injury, the need for additional therapies, permanent loss of body function, bleeding from the site, arterial puncture, air embolism, nerve damage, hematoma, phlebitis, catheter fracture/rupture, catheter embolism, catheter occlusion, catheter migration, catheter site infection, unintentional/accidental removal of catheter, bloodstream infection, infiltration, vein thrombosis, difficult catheter removal, pleural effusion. Alternatives discussed including centrally inserted central catheter, as well as less invasive procedures such as multiple peripheral IVs and extended dwell catheters    Consent signed and obtained by proceduralist   History/Labs/Allergies Reviewed  Placed ByM. Bro  RN  Assisted ByNA  Time out Performed using Two Identifiers  Lot # [id number]  Expiration date 3/31/2023  Catheter size 3  Chinese  Trimmed at 12 cm  Total length inserted 10 cm  External catheter length 2 cm  Location right cephalic vein  Number of attempts 1  Estimated blood loss 1 ml  Placement verified by- positive blood return & flushes easily  Special equipment used- ultrasound & micro-introducer technique   Catheter secured with statlock  Dressing applied- Tegaderm CHG  Lidocaine administered intradermally conc.1% 1 mL  Midline education:   [ x ] Discussed with patient/Family or POA prior to procedure. Risks and Benefits along with reason for procedure were discussed and teaching was reinforced with an education handout on Midline insertion. [facility] FAQ Catheter Associated Blood Stream Infections and [address] [facility] [address] REV. 7/13 Nursing and Booklet left at bedside or in chart. Patient (Family or POA) acknowledged understanding of information taught and agreed to procedure.     [  ] Was not discussed with patient/family or POA due to pts medical status at time of procedure. pts family or POA not available to discuss Midline education.  [facility] FAQ Catheter Associated Blood Stream Infections and [address] [facility] REV. 7/13 Nursing and Booklet left at bedside or in chart          Jose Browne RN

## 2022-08-05 NOTE — PROGRESS NOTES
NEUROLOGY INPATIENT PROGRESS NOTE    8/5/2022         Current Exam:     Chart reviewed. Discussed with RN. Patient continues with impaired memory. She is awake, alert, knows she is in the hospital. Nephrology is following. ID following and has the patient on antibiotics. Brief History:    Magdalena Mccloud is a  57 y.o. female with H/O ESRD on HD, HTN, DM, who was admitted on 8/2/2022 with AMS. On the day prior to arrival during the end of her dialysis session she became unresponsive and was unarousable. She was confused and disoriented and unable to recall what happened so was sent to [address] [place] [address] for further evaluation. There have been similar complaints going on since September of last year per patient's sister with an estimated 10 episodes altogether associated with hypotension and no seizure activity. She will awaken from the episodes confused, disoriented, amnesic of varying duration lasting up to 4 weeks. Patient is in working with nephrology for suspected dialysis disequilibrium syndrome. Numerous changes have been made with her dialysis exchange due to her continued episodes of unresponsiveness. CT head with nothing acute. MRI brain with chronic small vessel ischemic disease. EEG with mild diffuse slowing. She was found to have a UTI in the ED and was started on antibiotics. No current facility-administered medications on file prior to encounter. Current Outpatient Medications on File Prior to Encounter   Medication Sig Dispense Refill    atorvastatin (LIPITOR) 80 MG tablet Take 80 mg by mouth in the morning. busPIRone (BUSPAR) 10 MG tablet Take 15 mg by mouth in the morning and 15 mg in the evening. mirtazapine (REMERON) 15 MG tablet Take by mouth nightly      gabapentin (NEURONTIN) 300 MG capsule Take 300 mg by mouth in the morning.       tiZANidine (ZANAFLEX) 4 MG tablet Take by mouth every 6 hours as needed      carvedilol (COREG) 3.125 MG tablet Take 3.125 mg by mouth in the morning and 3.125 mg in the evening. Take with meals. miconazole (MICOTIN) 2 % powder Apply topically 2 times daily Apply topically 2 times daily. melatonin 3 MG TABS tablet Take 10 mg by mouth nightly      traZODone (DESYREL) 50 MG tablet Take 50 mg by mouth nightly      furosemide (LASIX) 80 MG tablet Take 80 mg by mouth in the morning and 80 mg before bedtime. sodium bicarbonate 650 MG tablet Take 650 mg by mouth in the morning and 650 mg at noon and 650 mg before bedtime. docusate sodium (COLACE) 100 MG capsule Take 100 mg by mouth in the morning and 100 mg before bedtime. tamsulosin (FLOMAX) 0.4 MG capsule Take 0.4 mg by mouth in the morning. aspirin 81 MG chewable tablet Take 81 mg by mouth in the morning. ranolazine (RANEXA) 500 MG extended release tablet Take 1,000 mg by mouth in the morning and 1,000 mg before bedtime. pantoprazole (PROTONIX) 40 MG tablet Take 40 mg by mouth in the morning. insulin lispro (HUMALOG) 100 UNIT/ML injection vial Inject into the skin 3 times daily (before meals) Unknown dose scale      acetaminophen (TYLENOL) 325 MG tablet Take 650 mg by mouth every 6 hours as needed for Pain      Baclofen (LIORESAL) 5 MG tablet Take 2.5 mg by mouth 2 times daily as needed      insulin glargine (LANTUS) 100 UNIT/ML injection vial Inject 50 Units into the skin 2 times daily      DULoxetine (CYMBALTA) 30 MG extended release capsule Take 30 mg by mouth in the morning. febuxostat (ULORIC) 40 MG TABS tablet Take 40 mg by mouth in the morning. loperamide (IMODIUM) 2 MG capsule Take 2 mg by mouth 2 times daily as needed for Diarrhea      lidocaine-prilocaine (EMLA) 2.5-2.5 % cream Apply topically as needed for Pain Apply topically as needed on dialysis days, Tues, Thurs, Sat         Allergies: Arlene Alcala is allergic to latex and metformin and related. No past medical history on file. No past surgical history on file.     Social History: Shakila Gunn      No family history on file. Objective:   /68   Pulse 84   Temp 97.5 °F (36.4 °C) (Temporal)   Resp 18   Ht 5' 5\" (1.651 m)   Wt 227 lb (103 kg)   SpO2 95%   BMI 37.77 kg/m²     Blood pressure range: Systolic (84ZII), OQF:477 , Min:131 , XJU:819   ; Diastolic (52YZS), PDX:05, Min:49, Max:68      Review of Systems:  Constitutional  Negative for fever and chills    HEENT  Negative for ear discharge, ear pain, nosebleed    Eyes  Negative for photophobia, pain and discharge    Respiratory  Negative for hemoptysis and sputum    Cardiovascular  Negative for orthopnea, claudication and PND    Gastrointestinal  Negative for abdominal pain, diarrhea, blood in stool    Musculoskeletal  Negative for joint pain, negative for myalgia    Skin  Negative for rash or itching    Endo/heme/allergies  Negative for polydipsia, environmental allergy    Psychiatric/behavioral  Negative for suicidal ideation. Patient is not anxious        NEUROLOGIC EXAMINATION  GENERAL  Appears comfortable and in no distress   HEENT  NC/ AT   NECK  Supple   MENTAL STATUS:  Alert, oriented to person and that she is in the hospital, not oriented to month or year (states 2020), impaired memory, normal speech, normal language, no hallucination or delusion. Intact naming. Cannot spell world forwards or do serial subtractions from 100. 3/3 recall after 3 minutes.    CRANIAL NERVES: II     -      Visual fields intact to confrontation  III,IV,VI -  EOMs full, no afferent defect, no CECELIA, no ptosis  V     -     Normal facial sensation  VII    -     Normal facial symmetry  VIII   -     Intact hearing  IX,X -     Symmetrical palate  XI    -     Symmetrical shoulder shrug  XII   -     Midline tongue, no atrophy    MOTOR FUNCTION: Lifts all limbs antigravity with normal bulk, normal tone and no involuntary movements, no tremor   SENSORY FUNCTION:  Normal touch, normal pin   CEREBELLAR FUNCTION:  Intact fine motor control over upper limbs REFLEX FUNCTION:  Symmetric, no perverted reflex, no Babinski sign   STATION and GAIT  Not tested       Data:    Lab Results:   CBC:   Recent Labs     08/02/22  1713 08/03/22  0454   WBC 6.8 6.4   HGB 9.9* 9.4*    220     BMP:    Recent Labs     08/02/22  1713 08/03/22  0454 08/04/22  0949    134* 136   K 3.8 3.6* 4.4   CL 96* 99 99   CO2 29 24 23   BUN 13 20 29*   CREATININE 1.60* 2.13* 3.10*   GLUCOSE 229* 220* 469*         Lab Results   Component Value Date    ALT 14 08/02/2022    AST 20 08/02/2022    TSH 1.89 08/03/2022    INR 0.9 08/03/2022    BHEGFAYO03 535 08/03/2022           Diagnostic data reviewed:  CT HEAD (8/2/22) -  No evidence for acute intracranial hemorrhage, territorial infarction or   intracranial mass lesion. Mild chronic microangiopathic ischemic disease. Mild generalized volume loss. BRAIN MRI (8/3/22) -  Chronic microvascular disease without acute intracranial abnormality. Chronic sinusitis most pronounced in the right maxillary sinus. EEG (8/3/22) -  This EEG shows the presence of mild intermittent  generalized slowing with slow sharp wave activity. This is likely more  concordant with mild underlying encephalopathy. Impression:  -Acute encephalopathy in the setting of UTI superimposed on a questionable dialysis disequilibrium syndrome  -Possible underlying cognitive disorder    Plan:  -ID is following and managing antibiotics  -Patient continues on aspirin, Lipitor daily  -Will get PMR consult for further recommendations  -Outpatient follow up in 4 weeks; patient also to get neuropsych testing as an outpatient   -We will follow    Please note that this note was generated using a voice recognition dictation software. Although every effort was made to ensure the accuracy of this automated transcription, some errors in transcription may have occurred.

## 2022-08-05 NOTE — PROGRESS NOTES
[facility]  Office: [address], DO, Eric Zeng, DO, Loren Salaso, DO, Sonia Gill, DO, Michele Caldwell MD, Wilfredo Beal MD, Ariel Perez MD, Reena Licea MD,  Mukul Lama MD, Prosper New MD, Javier Coon, DO, Kandice Caputo MD,  Abe Mohs, MD, Bonny Lisa MD, Laverne King, DO, Joelle Ontiveros MD, Rosa Ray MD, Candido Henriquez MD, [place] Officer, DO, Calli Cortes MD, Tosha Quezada MD, Sherly Marquez, CNP,  Dian Shah, CNP, Gearline Precise, CNP, Juliana Geronimo, CNP, Michael Hall PA-C, Jamari Shah, [facility], Andrez Alarcon, CNP, Daisy Dobbs, CNP, Kirsten Lewis, CNP, Lawrence Pineda, CNP, Tano Morgan, CNP, Armando Levin, CNS, Alexandra Davis, [facility], Umberto Schwarz, CNP, Kayla Pearson, CNP, Emilia Palomo, CNP           Enrike Rodgers [address]    Progress Note    8/5/2022    3:50 PM    Name:   Shahab Bruno  MRN:     [id number]     Acct:      [de-identified]   Room:   [facility]/5380-77   Day:  0  Admit Date:  8/2/2022  4:33 PM    PCP:   No primary care provider on file. Code Status:  Full Code    Subjective:     C/C:   Chief Complaint   Patient presents with    Altered Mental Status     Interval History Status: not changed     No significant changes today  Pt is still confused. She complains of dysuria   She is aware that she is having issues with her memory      Brief History:     26-year-old female initially presented to hospital for altered mental status during dialysis. She was found to have UTI, cultures are growing Klebsiella and VRE. She was seen by infectious disease who started her on meropenem and Zyvox. Patient was also seen by nephrology, there was concern for dialysis disequilibrium syndrome so dialysis was held and patient response was monitored.   Otherwise, patient glucose was uncontrolled    Review of Systems:   Pt not a good historian due to confusion but she did answer following     Constitutional:  negative for chills, fevers, sweats  Respiratory:  negative for cough, dyspnea on exertion, shortness of breath, wheezing  Cardiovascular:  negative for chest pain, chest pressure/discomfort, lower extremity edema, palpitations  Gastrointestinal:  negative for abdominal pain, constipation, diarrhea, nausea, vomiting  Neurological:  negative for dizziness, headache says she has issues with memory   : complains of burning with urination     Medications: Allergies: Allergies   Allergen Reactions    Latex Hives    Metformin And Related Diarrhea       Current Meds:   Scheduled Meds:    insulin glargine  50 Units SubCUTAneous BID    linezolid  600 mg Oral 2 times per day    meropenem  1,000 mg IntraVENous Q24H    insulin lispro  10 Units SubCUTAneous TID WC    docusate sodium  100 mg Oral BID    [Held by provider] DULoxetine  30 mg Oral Daily    febuxostat  40 mg Oral Daily    pantoprazole  40 mg Oral Daily    ranolazine  1,000 mg Oral BID    insulin lispro  0-4 Units SubCUTAneous TID WC    insulin lispro  0-4 Units SubCUTAneous Nightly    heparin (porcine)  5,000 Units SubCUTAneous 3 times per day    aspirin  81 mg Oral Daily    atorvastatin  10 mg Oral Daily    [Held by provider] busPIRone  10 mg Oral TID    carvedilol  3.125 mg Oral BID WC    furosemide  80 mg Oral BID    sodium bicarbonate  650 mg Oral TID    tamsulosin  0.4 mg Oral Daily    [Held by provider] traZODone  50 mg Oral Nightly    sodium chloride flush  5-40 mL IntraVENous 2 times per day     Continuous Infusions:    dextrose      sodium chloride       PRN Meds: glucose, dextrose bolus **OR** dextrose bolus, glucagon (rDNA), dextrose, sodium chloride flush, sodium chloride, ondansetron **OR** ondansetron, polyethylene glycol, acetaminophen **OR** acetaminophen    Data:     Past Medical History:   has no past medical history on file.     Social History:        Family History: No family history on file.    Vitals:  /68   Pulse 84   Temp 97.5 °F (36.4 °C) (Temporal)   Resp 18   Ht 5' 5\" (1.651 m)   Wt 227 lb (103 kg)   SpO2 95%   BMI 37.77 kg/m²   Temp (24hrs), Av °F (36.7 °C), Min:97.5 °F (36.4 °C), Max:98.4 °F (36.9 °C)    Recent Labs     22  0825 22  1202   POCGLU 365* 374* 295* 291*         I/O (24Hr):     Intake/Output Summary (Last 24 hours) at 2022 1550  Last data filed at 2022 1312  Gross per 24 hour   Intake 364.96 ml   Output 301 ml   Net 63.96 ml         Labs:  Hematology:  Recent Labs     22  1050   WBC 6.8  --  6.4 5.7   RBC 3.05*  --  2.94* 2.70*   HGB 9.9*  --  9.4* 8.7*   HCT 30.2*  --  30.7* 27.8*   MCV 99.0  --  104.4* 103.0*   MCH 32.5  --  32.0 32.2   MCHC 32.8  --  30.6 31.3   RDW 17.3*  --  17.5* 17.2*     --  220 198   MPV 10.0  --  10.0 10.1   SEDRATE  --  87*  --   --    CRP  --  9.8*  --   --    INR  --   --  0.9  --        Chemistry:  Recent Labs     22  0949 22  1050     --   --  134* 136 137   K 3.8  --   --  3.6* 4.4 3.9   CL 96*  --   --  99 99 102   CO2 29  --   --   22   GLUCOSE 229*  --   --  220* 469* 323*   BUN 13  --   --  20 29* 34*   CREATININE 1.60*  --   --  2.13* 3.10* 3.20*   MG  --   --  2.0  --   --   --    ANIONGAP 10  --   --  11 14 13   LABGLOM  --   --   --  23* 15* 15*   GFRAA  --   --   --  28* 18* 18*   CALCIUM 9.4  --   --  8.9 9.1 8.9   PHOS  --   --   --   --   --  3.3   TROPHS 101* 90*  --   --   --   --    LACTACIDWB  --   --  1.7  --   --   --        Recent Labs     22  1713 22  0104 22  0222 22  0722 22  1630 22  2029 22  0856 22  1229 22  1720 22  2116 22  0825 22  1202   PROT 7.4  --   --   --   --   --   --   --   --   --   --   --    LABALBU 3.6  --   --   --   --   --   -- --   --   --   --   --    TSH  --   --   --   --  1.89  --   --   --   --   --   --   --    AST 20  --   --   --   --   --   --   --   --   --   --   --    ALT 14  --   --   --   --   --   --   --   --   --   --   --    ALKPHOS 138*  --   --   --   --   --   --   --   --   --   --   --    LABGGT  --   --  43*  --   --   --   --   --   --   --   --   --    BILITOT 0.48  --   --   --   --   --   --   --   --   --   --   --    AMMONIA 14  --   --   --  21  --   --   --   --   --   --   --    POCGLU  --    < >  --    < >  --    < > 432* 402* 365* 374* 295* 291*    < > = values in this interval not displayed. ABG:  Lab Results   Component Value Date/Time    FIO2 INFORMATION NOT PROVIDED 08/05/2022 07:58 AM     Lab Results   Component Value Date/Time    SPECIAL RIGHT HAND 2ML 08/03/2022 02:22 AM     Lab Results   Component Value Date/Time    CULTURE NO GROWTH 2 DAYS 08/03/2022 02:22 AM       Radiology:  CT HEAD WO CONTRAST    Result Date: 8/2/2022  No evidence for acute intracranial hemorrhage, territorial infarction or intracranial mass lesion. Mild chronic microangiopathic ischemic disease. Mild generalized volume loss. MRI BRAIN WO CONTRAST    Result Date: 8/3/2022  Chronic microvascular disease without acute intracranial abnormality. Chronic sinusitis most pronounced in the right maxillary sinus. Physical Examination:        General appearance:  alert, cooperative and no distress  Mental Status:  she is not oriented to person, place or time but is able to tell me shes in the hospital but cannot clarify which.  Cannot remember the month or year  Lungs:  clear to auscultation bilaterally, normal effort  Heart:  regular rate and rhythm, no murmur  Abdomen:  soft, nontender, nondistended, normal bowel sounds, no masses, hepatomegaly, splenomegaly  Extremities:  no edema, redness, tenderness in the calves  Skin:  no gross lesions, rashes, induration    Assessment:        Hospital Problems             Last

## 2022-08-06 LAB
GLUCOSE BLD-MCNC: 117 MG/DL (ref 65–105)
GLUCOSE BLD-MCNC: 118 MG/DL (ref 65–105)
GLUCOSE BLD-MCNC: 162 MG/DL (ref 65–105)
GLUCOSE BLD-MCNC: 238 MG/DL (ref 65–105)

## 2022-08-06 PROCEDURE — 6360000002 HC RX W HCPCS: Performed by: INTERNAL MEDICINE

## 2022-08-06 PROCEDURE — 82947 ASSAY GLUCOSE BLOOD QUANT: CPT

## 2022-08-06 PROCEDURE — 99232 SBSQ HOSP IP/OBS MODERATE 35: CPT | Performed by: INTERNAL MEDICINE

## 2022-08-06 PROCEDURE — 99233 SBSQ HOSP IP/OBS HIGH 50: CPT | Performed by: INTERNAL MEDICINE

## 2022-08-06 PROCEDURE — 6370000000 HC RX 637 (ALT 250 FOR IP): Performed by: FAMILY MEDICINE

## 2022-08-06 PROCEDURE — 6370000000 HC RX 637 (ALT 250 FOR IP): Performed by: INTERNAL MEDICINE

## 2022-08-06 PROCEDURE — 6360000002 HC RX W HCPCS: Performed by: FAMILY MEDICINE

## 2022-08-06 PROCEDURE — 2580000003 HC RX 258: Performed by: INTERNAL MEDICINE

## 2022-08-06 PROCEDURE — 2580000003 HC RX 258: Performed by: NURSE PRACTITIONER

## 2022-08-06 PROCEDURE — 1200000000 HC SEMI PRIVATE

## 2022-08-06 PROCEDURE — 99232 SBSQ HOSP IP/OBS MODERATE 35: CPT | Performed by: NURSE PRACTITIONER

## 2022-08-06 RX ADMIN — FUROSEMIDE 80 MG: 40 TABLET ORAL at 09:39

## 2022-08-06 RX ADMIN — MEROPENEM 1000 MG: 1 INJECTION, POWDER, FOR SOLUTION INTRAVENOUS at 19:38

## 2022-08-06 RX ADMIN — INSULIN GLARGINE 55 UNITS: 100 INJECTION, SOLUTION SUBCUTANEOUS at 09:46

## 2022-08-06 RX ADMIN — DOCUSATE SODIUM 100 MG: 100 CAPSULE ORAL at 20:25

## 2022-08-06 RX ADMIN — SODIUM BICARBONATE 650 MG: 648 TABLET ORAL at 09:38

## 2022-08-06 RX ADMIN — RANOLAZINE 1000 MG: 500 TABLET, FILM COATED, EXTENDED RELEASE ORAL at 20:25

## 2022-08-06 RX ADMIN — INSULIN LISPRO 1 UNITS: 100 INJECTION, SOLUTION INTRAVENOUS; SUBCUTANEOUS at 12:40

## 2022-08-06 RX ADMIN — SODIUM BICARBONATE 650 MG: 648 TABLET ORAL at 16:20

## 2022-08-06 RX ADMIN — RANOLAZINE 1000 MG: 500 TABLET, FILM COATED, EXTENDED RELEASE ORAL at 09:38

## 2022-08-06 RX ADMIN — INSULIN LISPRO 10 UNITS: 100 INJECTION, SOLUTION INTRAVENOUS; SUBCUTANEOUS at 18:25

## 2022-08-06 RX ADMIN — CARVEDILOL 3.12 MG: 3.12 TABLET, FILM COATED ORAL at 09:39

## 2022-08-06 RX ADMIN — INSULIN LISPRO 10 UNITS: 100 INJECTION, SOLUTION INTRAVENOUS; SUBCUTANEOUS at 09:40

## 2022-08-06 RX ADMIN — INSULIN LISPRO 10 UNITS: 100 INJECTION, SOLUTION INTRAVENOUS; SUBCUTANEOUS at 12:41

## 2022-08-06 RX ADMIN — SODIUM BICARBONATE 650 MG: 648 TABLET ORAL at 20:25

## 2022-08-06 RX ADMIN — FUROSEMIDE 80 MG: 40 TABLET ORAL at 20:25

## 2022-08-06 RX ADMIN — DOCUSATE SODIUM 100 MG: 100 CAPSULE ORAL at 09:39

## 2022-08-06 RX ADMIN — FEBUXOSTAT 40 MG: 40 TABLET ORAL at 09:39

## 2022-08-06 RX ADMIN — HEPARIN SODIUM 5000 UNITS: 5000 INJECTION INTRAVENOUS; SUBCUTANEOUS at 16:20

## 2022-08-06 RX ADMIN — ATORVASTATIN CALCIUM 10 MG: 10 TABLET, FILM COATED ORAL at 09:39

## 2022-08-06 RX ADMIN — SODIUM CHLORIDE, PRESERVATIVE FREE 10 ML: 5 INJECTION INTRAVENOUS at 09:40

## 2022-08-06 RX ADMIN — HEPARIN SODIUM 5000 UNITS: 5000 INJECTION INTRAVENOUS; SUBCUTANEOUS at 05:33

## 2022-08-06 RX ADMIN — TAMSULOSIN HYDROCHLORIDE 0.4 MG: 0.4 CAPSULE ORAL at 09:38

## 2022-08-06 RX ADMIN — ASPIRIN 81 MG: 81 TABLET, COATED ORAL at 09:39

## 2022-08-06 RX ADMIN — SODIUM CHLORIDE, PRESERVATIVE FREE 10 ML: 5 INJECTION INTRAVENOUS at 20:25

## 2022-08-06 RX ADMIN — CARVEDILOL 3.12 MG: 3.12 TABLET, FILM COATED ORAL at 18:25

## 2022-08-06 RX ADMIN — HEPARIN SODIUM 5000 UNITS: 5000 INJECTION INTRAVENOUS; SUBCUTANEOUS at 20:25

## 2022-08-06 RX ADMIN — LINEZOLID 600 MG: 600 TABLET, FILM COATED ORAL at 09:39

## 2022-08-06 RX ADMIN — LINEZOLID 600 MG: 600 TABLET, FILM COATED ORAL at 20:25

## 2022-08-06 RX ADMIN — PANTOPRAZOLE SODIUM 40 MG: 40 TABLET, DELAYED RELEASE ORAL at 09:39

## 2022-08-06 ASSESSMENT — ENCOUNTER SYMPTOMS
CHEST TIGHTNESS: 0
DIARRHEA: 0
SHORTNESS OF BREATH: 0
CONSTIPATION: 0
CHOKING: 0
RHINORRHEA: 0
EYE ITCHING: 0
EYE PAIN: 0
SINUS PAIN: 0
COUGH: 0
COLOR CHANGE: 0
ABDOMINAL PAIN: 0
BACK PAIN: 0
ABDOMINAL DISTENTION: 0

## 2022-08-06 ASSESSMENT — PAIN SCALES - GENERAL
PAINLEVEL_OUTOF10: 0
PAINLEVEL_OUTOF10: 0

## 2022-08-06 NOTE — PROGRESS NOTES
[facility]  Office: [address], DO, Cady Perry, DO, Ant Hernandez, DO, Sharyn Huang [facility], DO, Nidhi Bocanegra MD, Donte Jon MD, Teresa Carver MD, Madhu Blankenship MD,  Louie Cade MD, Guido Vaughn MD, David Gandhi, DO, Edward Merritt MD,  Joanie Walton MD, Parmjit Metz MD, Sada Freeman, DO, Bautista Ontiveros MD, Geneva Villafana MD, Jing Herbert MD, Sweetie Saenz, DO, Lavern Terry MD, Harriett Russell MD, Becca Villalobos, CNP,  Alexandra Vazquez, CNP, Isi Law, CNP, Shane Bhatia, CNP, Trevor Garcia PA-C, Shena Clay, [facility], Michael Griffin, CNP, Sarah Beth Fleming, CNP, Franco Rodriguez, CNP, Jonathan Izaguirre, CNP, Demond Trammell, CNP, Luis Mullen, CNS, Rashawn Calle, [facility], Bienvenido Roach, CNP, Keli Beltrán, CNP, Deborah Asher, CNP           [address]    Progress Note    8/6/2022    4:16 PM    Name:   Luís Marshall  MRN:     [id number]     Acct:      [de-identified]   Room:   [id number]   Day:  1  Admit Date:  8/2/2022  4:33 PM    PCP:   No primary care provider on file. Code Status:  Full Code    Subjective:     C/C:   Chief Complaint   Patient presents with    Altered Mental Status     Interval History Status: not changed     No significant changes today  Pt is still confused and feels frustrated. She is still  complaining  of dysuria   She is aware that she is having issues with her memory      Brief History:     59-year-old female initially presented to hospital for altered mental status during dialysis. She was found to have UTI, cultures are growing Klebsiella and VRE. She was seen by infectious disease who started her on meropenem and Zyvox. Patient was also seen by nephrology, there was concern for dialysis disequilibrium syndrome so dialysis was held and patient response was monitored.   Otherwise, patient glucose was uncontrolled    Review of Systems:   Pt not a good historian due to confusion but she did answer following     Constitutional:  negative for chills, fevers, sweats  Respiratory:  negative for cough, dyspnea on exertion, shortness of breath, wheezing  Cardiovascular:  negative for chest pain, chest pressure/discomfort, lower extremity edema, palpitations  Gastrointestinal:  negative for abdominal pain, constipation, diarrhea, nausea, vomiting  Neurological:  negative for dizziness, headache says she has issues with memory   : complains of burning with urination     Medications: Allergies: Allergies   Allergen Reactions    Latex Hives    Metformin And Related Diarrhea       Current Meds:   Scheduled Meds:    insulin glargine  55 Units SubCUTAneous BID    linezolid  600 mg Oral 2 times per day    meropenem  1,000 mg IntraVENous Q24H    insulin lispro  10 Units SubCUTAneous TID WC    docusate sodium  100 mg Oral BID    [Held by provider] DULoxetine  30 mg Oral Daily    febuxostat  40 mg Oral Daily    pantoprazole  40 mg Oral Daily    ranolazine  1,000 mg Oral BID    insulin lispro  0-4 Units SubCUTAneous TID WC    insulin lispro  0-4 Units SubCUTAneous Nightly    heparin (porcine)  5,000 Units SubCUTAneous 3 times per day    aspirin  81 mg Oral Daily    atorvastatin  10 mg Oral Daily    [Held by provider] busPIRone  10 mg Oral TID    carvedilol  3.125 mg Oral BID WC    furosemide  80 mg Oral BID    sodium bicarbonate  650 mg Oral TID    tamsulosin  0.4 mg Oral Daily    [Held by provider] traZODone  50 mg Oral Nightly    sodium chloride flush  5-40 mL IntraVENous 2 times per day     Continuous Infusions:    dextrose      sodium chloride       PRN Meds: glucose, dextrose bolus **OR** dextrose bolus, glucagon (rDNA), dextrose, sodium chloride flush, sodium chloride, ondansetron **OR** ondansetron, polyethylene glycol, acetaminophen **OR** acetaminophen    Data:     Past Medical History:   has no past medical history on file.     Social History:        Family History: No Examination:        General appearance:  alert, cooperative and no distress  Mental Status:  she is not oriented to person, place or time but is able to tell me shes in the hospital but cannot clarify which. Cannot remember the month or year  Lungs:  clear to auscultation bilaterally, normal effort  Heart:  regular rate and rhythm, no murmur  Abdomen:  soft, nontender, nondistended, normal bowel sounds, no masses, hepatomegaly, splenomegaly  Extremities:  no edema, redness, tenderness in the calves  Skin:  no gross lesions, rashes, induration    Assessment:        Hospital Problems             Last Modified POA    * (Principal) Dialysis disequilibrium syndrome 8/4/2022 Yes    Vancomycin resistant Enterococcus UTI 8/4/2022 Yes    Cystitis 8/5/2022 Yes    VRE infection (vancomycin resistant Enterococcus) 8/4/2022 Yes    Infection due to ESBL-producing Klebsiella pneumoniae 8/4/2022 Yes    ESRD (end stage renal disease) on dialysis ([facility] [address] 8/5/2022 Yes    Encephalopathy 8/3/2022 Yes    Syncope and collapse 8/3/2022 Yes    Class 2 severe obesity due to excess calories with serious comorbidity and body mass index (BMI) of 35.0 to 35.9 in adult [facility]) 8/4/2022 Yes    Type 2 diabetes mellitus with hyperglycemia, with long-term current use of insulin ([facility] [address] 8/4/2022 Yes    Disorientation 3/6/0993 Yes    Metabolic encephalopathy 8/5/0508 Yes     Plan:        Toxic metabolic encephalopathy: 2/2 UTI+/-disequilibrium syndrome, and possible med effect from Zanaflex  and Gabapentin  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 Started on Zyvox and meropenem for 7 days until 8/10/2022. We will hold midline on discharge per infectious disease. Nephrology following, recommend less frequent/shorter HD sessions and eventual transition to peritoneal dialysis in the future. They will discuss with pts primary nephrologist.   Neurology recommend PMR consult and outpt neuropsych testing.    Diabetes mellitus type 2 with hyperglycemia: continue lantus to  55 units BID lispro 10 units TID with correctional insulin monitor for hypoglycemia adjust every couple of days pending on requirments   Anemia of chronic inflammation: monitor, transfuse as needed  Obesity BMI of 37: Recommend weight loss lifestyle modification  Depression: hold Cymbalta trazodone and Buspar while on zyvox  Gout: Restart allopurinol  Labs, imaging, EKG reviewed  PT OT    Dispo: plans to be discharged to [facility]  Jerrell Damon DO  8/6/2022  4:16 PM

## 2022-08-06 NOTE — PROGRESS NOTES
NEUROLOGY INPATIENT PROGRESS NOTE    8/6/2022         Current Exam:     Chart reviewed. Discussed with RN. She is out of bed in the chair, watching television. Denies any new complaints overnight. Brief History:    Luís Marshall is a  57 y.o. female with H/O ESRD on HD, HTN, DM, who was admitted on 8/2/2022 with AMS. On the day prior to arrival during the end of her dialysis session she became unresponsive and was unarousable. She was confused and disoriented and unable to recall what happened so was sent to [address] [place] [address] ER for further evaluation. There have been similar complaints going on since September of last year per patient's sister with an estimated 10 episodes altogether associated with hypotension and no seizure activity. She will awaken from the episodes confused, disoriented, amnesic of varying duration lasting up to 4 weeks. Patient is in working with nephrology for suspected dialysis disequilibrium syndrome. Numerous changes have been made with her dialysis exchange due to her continued episodes of unresponsiveness. CT head with nothing acute. MRI brain with chronic small vessel ischemic disease. EEG with mild diffuse slowing. She was found to have a UTI in the ED and was started on antibiotics. No current facility-administered medications on file prior to encounter. Current Outpatient Medications on File Prior to Encounter   Medication Sig Dispense Refill    atorvastatin (LIPITOR) 80 MG tablet Take 80 mg by mouth in the morning. busPIRone (BUSPAR) 10 MG tablet Take 15 mg by mouth in the morning and 15 mg in the evening. mirtazapine (REMERON) 15 MG tablet Take by mouth nightly      gabapentin (NEURONTIN) 300 MG capsule Take 300 mg by mouth in the morning. tiZANidine (ZANAFLEX) 4 MG tablet Take by mouth every 6 hours as needed      carvedilol (COREG) 3.125 MG tablet Take 3.125 mg by mouth in the morning and 3.125 mg in the evening. Take with meals.       miconazole (MICOTIN) 2 % powder Apply topically 2 times daily Apply topically 2 times daily. melatonin 3 MG TABS tablet Take 10 mg by mouth nightly      traZODone (DESYREL) 50 MG tablet Take 50 mg by mouth nightly      furosemide (LASIX) 80 MG tablet Take 80 mg by mouth in the morning and 80 mg before bedtime. sodium bicarbonate 650 MG tablet Take 650 mg by mouth in the morning and 650 mg at noon and 650 mg before bedtime. docusate sodium (COLACE) 100 MG capsule Take 100 mg by mouth in the morning and 100 mg before bedtime. tamsulosin (FLOMAX) 0.4 MG capsule Take 0.4 mg by mouth in the morning. aspirin 81 MG chewable tablet Take 81 mg by mouth in the morning. ranolazine (RANEXA) 500 MG extended release tablet Take 1,000 mg by mouth in the morning and 1,000 mg before bedtime. pantoprazole (PROTONIX) 40 MG tablet Take 40 mg by mouth in the morning. insulin lispro (HUMALOG) 100 UNIT/ML injection vial Inject into the skin 3 times daily (before meals) Unknown dose scale      acetaminophen (TYLENOL) 325 MG tablet Take 650 mg by mouth every 6 hours as needed for Pain      Baclofen (LIORESAL) 5 MG tablet Take 2.5 mg by mouth 2 times daily as needed      insulin glargine (LANTUS) 100 UNIT/ML injection vial Inject 50 Units into the skin 2 times daily      DULoxetine (CYMBALTA) 30 MG extended release capsule Take 30 mg by mouth in the morning. febuxostat (ULORIC) 40 MG TABS tablet Take 40 mg by mouth in the morning. loperamide (IMODIUM) 2 MG capsule Take 2 mg by mouth 2 times daily as needed for Diarrhea      lidocaine-prilocaine (EMLA) 2.5-2.5 % cream Apply topically as needed for Pain Apply topically as needed on dialysis days, Tues, Thurs, Sat         Allergies: Azeem Stern is allergic to latex and metformin and related. No past medical history on file. No past surgical history on file. Social History: Azeem Stern      No family history on file.     Objective:   BP (!) 123/46   Pulse 69   Temp 97.1 °F (36.2 °C) (Temporal)   Resp 15   Ht 5' 5\" (1.651 m)   Wt 227 lb (103 kg)   SpO2 96%   BMI 37.77 kg/m²     Blood pressure range: Systolic (43MCC), IEN:896 , Min:123 , LFJ:531   ; Diastolic (41FOA), XBK:57, Min:46, Max:46      Review of Systems:  Constitutional  Negative for fever and chills    HEENT  Negative for ear discharge, ear pain, nosebleed    Eyes  Negative for photophobia, pain and discharge    Respiratory  Negative for hemoptysis and sputum    Cardiovascular  Negative for orthopnea, claudication and PND    Gastrointestinal  Negative for abdominal pain, diarrhea, blood in stool    Musculoskeletal  Negative for joint pain, negative for myalgia    Skin  Negative for rash or itching    Endo/heme/allergies  Negative for polydipsia, environmental allergy    Psychiatric/behavioral  Negative for suicidal ideation. Patient is not anxious        NEUROLOGIC EXAMINATION  GENERAL  Appears comfortable and in no distress   HEENT  NC/ AT   NECK  Supple   MENTAL STATUS:  Alert, oriented to person and that she is in the hospital, year but not to month, impaired memory, normal speech, normal language, no hallucination or delusion. Intact naming. Cannot spell world forwards or do serial subtractions from 100. 3/3 recall after 3 minutes.    CRANIAL NERVES: II     -      Visual fields intact to confrontation  III,IV,VI -  EOMs full, no afferent defect, no CECELIA, no ptosis  V     -     Normal facial sensation  VII    -     Normal facial symmetry  VIII   -     Intact hearing  IX,X -     Symmetrical palate  XI    -     Symmetrical shoulder shrug  XII   -     Midline tongue, no atrophy    MOTOR FUNCTION: Lifts all limbs antigravity with normal bulk, normal tone and no involuntary movements, no tremor   SENSORY FUNCTION:  Normal touch, normal pin   CEREBELLAR FUNCTION:  Intact fine motor control over upper limbs   REFLEX FUNCTION:  Symmetric, no perverted reflex, no Babinski sign   STATION and GAIT  Not tested       Data:    Lab Results:   CBC:   Recent Labs     08/05/22  1050   WBC 5.7   HGB 8.7*        BMP:    Recent Labs     08/04/22  0949 08/05/22  1050    137   K 4.4 3.9   CL 99 102   CO2 23 22   BUN 29* 34*   CREATININE 3.10* 3.20*   GLUCOSE 469* 323*         Lab Results   Component Value Date    ALT 14 08/02/2022    AST 20 08/02/2022    TSH 1.89 08/03/2022    INR 0.9 08/03/2022    [id number] 535 08/03/2022           Diagnostic data reviewed:  CT HEAD (8/2/22) -  No evidence for acute intracranial hemorrhage, territorial infarction or   intracranial mass lesion. Mild chronic microangiopathic ischemic disease. Mild generalized volume loss. BRAIN MRI (8/3/22) -  Chronic microvascular disease without acute intracranial abnormality. Chronic sinusitis most pronounced in the right maxillary sinus. EEG (8/3/22) -  This EEG shows the presence of mild intermittent  generalized slowing with slow sharp wave activity. This is likely more  concordant with mild underlying encephalopathy. Impression:  -Acute encephalopathy in the setting of UTI superimposed on a questionable dialysis disequilibrium syndrome  -Possible underlying cognitive disorder    Plan:  -ID is following and managing antibiotics  -Patient continues on aspirin, Lipitor daily  -Will get PMR consult for further recommendations  -Outpatient follow up in 4 weeks; patient also to get neuropsych testing as an outpatient   -Neurologically clear. We will follow while she is here    Please note that this note was generated using a voice recognition dictation software. Although every effort was made to ensure the accuracy of this automated transcription, some errors in transcription may have occurred.

## 2022-08-06 NOTE — PLAN OF CARE
Problem: ABCDS Injury Assessment  Goal: Absence of physical injury  Outcome: Progressing  Flowsheets (Taken 8/6/2022 1100)  Absence of Physical Injury: Implement safety measures based on patient assessment     Problem: Safety - Adult  Goal: Free from fall injury  Outcome: Progressing  Flowsheets (Taken 8/6/2022 1100)  Free From Fall Injury:   Instruct family/caregiver on patient safety   Based on caregiver fall risk screen, instruct family/caregiver to ask for assistance with transferring infant if caregiver noted to have fall risk factors     Problem: Skin/Tissue Integrity  Goal: Absence of new skin breakdown  Description: 1. Monitor for areas of redness and/or skin breakdown  2. Assess vascular access sites hourly  3. Every 4-6 hours minimum:  Change oxygen saturation probe site  4. Every 4-6 hours:  If on nasal continuous positive airway pressure, respiratory therapy assess nares and determine need for appliance change or resting period.   Outcome: Progressing     Problem: Chronic Conditions and Co-morbidities  Goal: Patient's chronic conditions and co-morbidity symptoms are monitored and maintained or improved  Outcome: Progressing     Problem: Pain  Goal: Verbalizes/displays adequate comfort level or baseline comfort level  Outcome: Progressing

## 2022-08-06 NOTE — PROGRESS NOTES
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  Suspected to be Dialysis disequilibrium syndrome  Chronic UTI - VRE and ESBL klebsiella    Other:  ESRD  Type 2 DM  ERICK  Chronic hypoxic respiratory failure  anemia  Discussion / summary of stay / plan of care     Recommendations   zyvox and meropenem for -7 days till 8/10  Will need midline on discharge - ordered  Nephrology - pending alternative dialysis option  Carotid Doppler-less than 50% stenosis bilateral ICA    Infection Control Recommendations   [place] Precautions  Contact Isolation       Antimicrobial Stewardship Recommendations   Simplification of therapy  Targeted therapy    History of Present Illness:   Initial history:  History obtained from chart review  Turner Perales is a 57y.o.-year-old female with past medical history of ESRD on hemodialysis, type 2 diabetes, ERICK, chronic hypoxic respiratory failure, history of global amnesia occurring up during dialysis and taking 1 month to resolve. Patient was on dialysis on 8/2 and became confused at the end of the dialysis and was brought to the ER. In the ER patient was confused with global amnesia does not even know her name. Had no focal deficit other than global amnesia. Strength was intact in all the extremities with intact sensation. Patient denied headache, chest pain or shortness of breath or abdominal pain. In the ED patient was hemodynamically stable. Creatinine 1.6, CRP 9.8,  Blood sugar 402, WBC 6.8, urine culture positive for VRE sensitive to linezolid. ESR 87. Negative blood culture. MRI brain showed chronic microvascular disease without any acute intracranial abnormality. EEG is negative for any epileptiform activity.      Echo showed ejection fraction of 60%. Patient was admitted to the observation unit and ID was consulted for VRE positive urine culture. Interval changes  8/6/2022   Patient Vitals for the past 8 hrs:   BP Temp Temp src Pulse Resp SpO2   08/06/22 0906 (!) 123/50 97.4 °F (36.3 °C) Temporal 80 12 96 %     Current evaluation  Patient's memory is the same as of yesterday-patient remembers yesterday's events and has everything written down in the paper. Patient is alert, not in acute distress. States having less burning sensation while urination than yesterday and no pain in abdomen, no urgency or frequency  Afebrile  Urine output yesterday was 300 mL-family meeting with nephrology to discuss on further dialysis plan - possibly peritoneal dialysis tomorrow  Urine culture-8/3 was positive for VRE and klebsiella-ESBL  Echo 8/3-ejection fraction 60%-otherwise unremarkable    Summary of relevant labs: 8/6/2022    Labs:  Creatinine-1.6-2.13-3.10-3.2  BUN -13-20-29-34  Blood sugar 162  CRP 9.8  ESR 87  Toxic urinary screen was negative for benzodiazepine methadone and cannabinoid  TSH-1.89    Micro:  Urine culture 8/3-positive for VRE and urine culture 7/31-positive for Klebsiella-ESBL  blood culture 8/3 - negative so far  Imaging:  Carotid Doppler 8/4-less than 50% stenosis of bilateral ICA    MRI brain 8/3-  Impression   Chronic microvascular disease without acute intracranial abnormality. Chronic sinusitis most pronounced in the right maxillary sinus. I have personally reviewed the past medical history, past surgical history, medications, social history, and family history, and I haveupdated the database accordingly. Discussed with patient, RN, family. Allergies:   Latex and Metformin and related     Review of Systems:     Review of Systems   Constitutional:  Negative for activity change, chills and fever. HENT:  Negative for ear pain, rhinorrhea and sinus pain. Eyes:  Negative for pain and itching. Respiratory:  Negative for cough, choking, chest tightness and shortness of breath. Cardiovascular:  Negative for chest pain and leg swelling. Gastrointestinal:  Negative for abdominal distention, abdominal pain, constipation and diarrhea. Endocrine: Negative. Genitourinary:  Positive for dysuria. Negative for difficulty urinating, enuresis, frequency, hematuria and urgency. Musculoskeletal:  Negative for arthralgias, back pain and joint swelling. Skin:  Negative for color change and pallor. Neurological:  Negative for dizziness, syncope, facial asymmetry and headaches. Hematological: Negative. Psychiatric/Behavioral:  Negative for agitation and behavioral problems. Physical Examination :       Physical Exam  Constitutional:       General: She is not in acute distress. Appearance: Normal appearance. She is obese. She is not ill-appearing or toxic-appearing. HENT:      Right Ear: External ear normal.      Left Ear: External ear normal.      Nose: Nose normal.      Mouth/Throat:      Mouth: Mucous membranes are moist.   Eyes:      Pupils: Pupils are equal, round, and reactive to light. Cardiovascular:      Rate and Rhythm: Normal rate. Pulses: Normal pulses. Heart sounds: Normal heart sounds. Pulmonary:      Effort: Pulmonary effort is normal.      Breath sounds: Normal breath sounds. Abdominal:      Palpations: Abdomen is soft. Tenderness: There is no abdominal tenderness. There is no guarding or rebound. Musculoskeletal:         General: No swelling, tenderness or deformity. Cervical back: Normal range of motion. Skin:     Coloration: Skin is not jaundiced or pale. Findings: No bruising or erythema. Neurological:      General: No focal deficit present. Cranial Nerves: No cranial nerve deficit. Sensory: No sensory deficit. Comments: Has global memory loss. Does not know about the place, time or person. Not in distress.  Motor and sensory is normal.    Psychiatric:         Behavior: Behavior normal.         Thought Content: Thought content normal.       Past Medical History:   No past medical history on file. Past Surgical  History:   No past surgical history on file.     Medications:      insulin glargine  55 Units SubCUTAneous BID    linezolid  600 mg Oral 2 times per day    meropenem  1,000 mg IntraVENous Q24H    insulin lispro  10 Units SubCUTAneous TID     docusate sodium  100 mg Oral BID    [Held by provider] DULoxetine  30 mg Oral Daily    febuxostat  40 mg Oral Daily    pantoprazole  40 mg Oral Daily    ranolazine  1,000 mg Oral BID    insulin lispro  0-4 Units SubCUTAneous TID WC    insulin lispro  0-4 Units SubCUTAneous Nightly    heparin (porcine)  5,000 Units SubCUTAneous 3 times per day    aspirin  81 mg Oral Daily    atorvastatin  10 mg Oral Daily    [Held by provider] busPIRone  10 mg Oral TID    carvedilol  3.125 mg Oral BID WC    furosemide  80 mg Oral BID    sodium bicarbonate  650 mg Oral TID    tamsulosin  0.4 mg Oral Daily    [Held by provider] traZODone  50 mg Oral Nightly    sodium chloride flush  5-40 mL IntraVENous 2 times per day       Social History:     Social History     Socioeconomic History    Marital status: Single     Spouse name: Not on file    Number of children: Not on file    Years of education: Not on file    Highest education level: Not on file   Occupational History    Not on file   Tobacco Use    Smoking status: Not on file    Smokeless tobacco: Not on file   Substance and Sexual Activity    Alcohol use: Not on file    Drug use: Not on file    Sexual activity: Not on file   Other Topics Concern    Not on file   Social History Narrative    Not on file     Social Determinants of Health     Financial Resource Strain: Not on file   Food Insecurity: Not on file   Transportation Needs: Not on file   Physical Activity: Not on file   Stress: Not on file   Social Connections: Not on file   Intimate Partner Violence: Not on file   Housing Stability: Not on file       Family History:   No family history on file. Medical Decision Making:   I have independently reviewed/ordered the following labs:    CBC with Differential:   Recent Labs     08/05/22  1050   WBC 5.7   HGB 8.7*   HCT 27.8*      LYMPHOPCT 15*   MONOPCT 7       BMP:  Recent Labs     08/04/22  0949 08/05/22  1050    137   K 4.4 3.9   CL 99 102   CO2 23 22   BUN 29* 34*   CREATININE 3.10* 3.20*       Hepatic Function Panel:   No results for input(s): PROT, LABALBU, BILIDIR, IBILI, BILITOT, ALKPHOS, ALT, AST in the last 72 hours. No results for input(s): RPR in the last 72 hours. No results for input(s): HIV in the last 72 hours. No results for input(s): BC in the last 72 hours. Lab Results   Component Value Date/Time    CREATININE 3.20 08/05/2022 10:50 AM    GLUCOSE 323 08/05/2022 10:50 AM       Detailed results: Thank you for allowing us to participate in the care of this patient. Please call with questions. This note is created with the assistance of a speech recognition program.  While intending to generate adocument that actually reflects the content of the visit, the document can still have some errors including those of syntax and sound a like substitutions which may escape proof reading. It such instances, actual meaningcan be extrapolated by contextual diversion. Brian Chavez MD  Office: [phone]  Perfect serve / office [phone]    ATTESTATION:    I have discussed the case, including pertinent history and exam findings with the residents and students. I have seen and examined the patient and the key elements of the encounter have been performed by me. I was present when the student obtained his information or examined the patient. I have reviewed the laboratory data, other diagnostic studies and discussed them with the residents. I have updated the medical record where necessary.     I agree with the assessment, plan and orders as documented by the resident/ student.     Luis Miguel Coe MD.

## 2022-08-06 NOTE — PLAN OF CARE
Problem: ABCDS Injury Assessment  Goal: Absence of physical injury  Outcome: Progressing     Problem: Safety - Adult  Goal: Free from fall injury  Outcome: Progressing     Problem: Skin/Tissue Integrity  Goal: Absence of new skin breakdown  Description: 1. Monitor for areas of redness and/or skin breakdown  2. Assess vascular access sites hourly  3. Every 4-6 hours minimum:  Change oxygen saturation probe site  4. Every 4-6 hours:  If on nasal continuous positive airway pressure, respiratory therapy assess nares and determine need for appliance change or resting period.   Outcome: Progressing     Problem: Chronic Conditions and Co-morbidities  Goal: Patient's chronic conditions and co-morbidity symptoms are monitored and maintained or improved  Outcome: Progressing

## 2022-08-06 NOTE — PROGRESS NOTES
Nephrology Progress Note      SUBJECTIVE    Brief history: 68-year-old female past medical history of ESRD on hemodialysis, type 2 diabetes, ERICK, chronic hypoxic respiratory failure, history of global amnesia occurring up to 10 times during dialysis which is taken up to a month to resolve. Nephrology consulted for global amnesia during dialysis and ESRD on HD. Patient seen and examined. She remains hemodynamically stable, afebrile and saturating well on room air, she complains of no urine output this morning along with developing slight edema in her lower extremities. She denies shortness of breath, chest pain, fevers or chills. .  Morning labs still pending. Plan is for family meeting this afternoon to discuss further dialysis plans. Patient has received a midline catheter for IV antibiotics for her UTI. Culture growing VRE and ESBL. She is on Zyvox and meropenem until 8/10/2022 per ID recommendations. OBJECTIVE      CURRENT TEMPERATURE:  Temp: 97.4 °F (36.3 °C)  MAXIMUM TEMPERATURE OVER 24HRS:  Temp (24hrs), Av.3 °F (36.3 °C), Min:97.1 °F (36.2 °C), Max:97.4 °F (36.3 °C)    CURRENT RESPIRATORY RATE:  Resp: 12  CURRENT PULSE:  Heart Rate: 80  CURRENT BLOOD PRESSURE:  BP: (!) 123/50  24HR BLOOD PRESSURE RANGE:  Systolic (63ARV), ZYA:590 , Min:123 , UBL:616   ; Diastolic (19MHZ), DCA:44, Min:46, Max:50    24HR INTAKE/OUTPUT:  No intake or output data in the 24 hours ending 22 1355      PHYSICAL EXAM      Physical Exam:  General:  Pleasant and cooperative. No apparent distress. HEENT:  Normocephalic, atraumatic, mucus membranes moist.   Neck:  Trachea midline, no JVD. Chest:  Clear to auscultation bilaterally. No wheezes, rales, or rhonchi. CV:  Regular rate and rhythm. No murmur, no gallops, no rubs  Abdomen:  Abdomen is soft, non-tender, non-distended, no rebound or guarding. Extremities: Trace lower extremity edema or cyanosis, peripheral pulses intact  Neurological:  AAOx3.  No focal deficits. Skin:  Warm and dry.   Edema}    CURRENT MEDICATIONS      insulin glargine (LANTUS) injection vial 55 Units, BID  linezolid (ZYVOX) tablet 600 mg, 2 times per day  meropenem (MERREM) 1,000 mg in sodium chloride 0.9 % 100 mL IVPB (mini-bag), Q24H  insulin lispro (HUMALOG) injection vial 10 Units, TID WC  docusate sodium (COLACE) capsule 100 mg, BID  [Held by provider] DULoxetine (CYMBALTA) extended release capsule 30 mg, Daily  febuxostat (ULORIC) tablet 40 mg, Daily  pantoprazole (PROTONIX) tablet 40 mg, Daily  ranolazine (RANEXA) extended release tablet 1,000 mg, BID  insulin lispro (HUMALOG) injection vial 0-4 Units, TID WC  insulin lispro (HUMALOG) injection vial 0-4 Units, Nightly  glucose chewable tablet 16 g, PRN  dextrose bolus 10% 125 mL, PRN   Or  dextrose bolus 10% 250 mL, PRN  glucagon (rDNA) injection 1 mg, PRN  dextrose 10 % infusion, Continuous PRN  heparin (porcine) injection 5,000 Units, 3 times per day  aspirin EC tablet 81 mg, Daily  atorvastatin (LIPITOR) tablet 10 mg, Daily  [Held by provider] busPIRone (BUSPAR) tablet 10 mg, TID  carvedilol (COREG) tablet 3.125 mg, BID WC  furosemide (LASIX) tablet 80 mg, BID  sodium bicarbonate tablet 650 mg, TID  tamsulosin (FLOMAX) capsule 0.4 mg, Daily  [Held by provider] traZODone (DESYREL) tablet 50 mg, Nightly  sodium chloride flush 0.9 % injection 5-40 mL, 2 times per day  sodium chloride flush 0.9 % injection 10 mL, PRN  0.9 % sodium chloride infusion, PRN  ondansetron (ZOFRAN-ODT) disintegrating tablet 4 mg, Q8H PRN   Or  ondansetron (ZOFRAN) injection 4 mg, Q6H PRN  polyethylene glycol (GLYCOLAX) packet 17 g, Daily PRN  acetaminophen (TYLENOL) tablet 650 mg, Q6H PRN   Or  acetaminophen (TYLENOL) suppository 650 mg, Q6H PRN        LABS      CBC:   Recent Labs     08/05/22  1050   WBC 5.7   RBC 2.70*   HGB 8.7*   HCT 27.8*   .0*   MCH 32.2   MCHC 31.3   RDW 17.2*      MPV 10.1        BMP:   Recent Labs     08/04/22  0949 08/05/22  1050    137   K 4.4 3.9   CL 99 102   CO2 23 22   BUN 29* 34*   CREATININE 3.10* 3.20*   GLUCOSE 469* 323*   CALCIUM 9.1 8.9        BNP:No results found for: BNP  PHOSPHORUS:    Recent Labs     08/05/22  1050   PHOS 3.3       MAGNESIUM:   No results for input(s): MG in the last 72 hours. ALBUMIN:   No results for input(s): LABALBU in the last 72 hours. IRON:  No results found for: IRON  IRON SATURATION:  No results found for: LABIRON  TIBC:  No results found for: TIBC  FERRITIN:  No results found for: FERRITIN  RUSSELL:   Lab Results   Component Value Date    RUSSELL NEGATIVE 08/03/2022         SPEP:   Lab Results   Component Value Date/Time    PROT 7.4 08/02/2022 05:13 PM     UPEP: No results found for: TPU   HEPBSAG:No results found for: HEPBSAG  HEPCAB:No results found for: HEPCAB  C3: No results found for: C3  C4: No results found for: C4  MPO ANCA: No results found for: MPO .   PR3 ANCA:  No results found for: PR3  URINE SODIUM:  No results found for: JASON   URINE POTASSIUM:  No results found for: KUR  URINE CHLORIDE:  No results found for: CLU  URINE PH:  No components found for: PO4U  URINE OSMOLARITY:  No results found for: OSMOU  URINE CREATININE:  No results found for: LABCREA  URINE EOSINOPHILS: No components found for: EOSU  URINE PROTEIN:  No results found for: TPU  URINALYSIS:  U/A:   Lab Results   Component Value Date/Time    NITRU NEGATIVE 08/02/2022 09:46 PM    COLORU Dark Yellow 08/02/2022 09:46 PM    PHUR 6.5 08/02/2022 09:46 PM    WBCUA TOO NUMEROUS TO COUNT 08/02/2022 09:46 PM    RBCUA 2 TO 5 08/02/2022 09:46 PM    BACTERIA MANY 08/02/2022 09:46 PM    SPECGRAV 1.016 08/02/2022 09:46 PM    LEUKOCYTESUR LARGE 08/02/2022 09:46 PM    UROBILINOGEN Normal 08/02/2022 09:46 PM    BILIRUBINUR NEGATIVE 08/02/2022 09:46 PM    GLUCOSEU NEGATIVE 08/02/2022 09:46 PM    KETUA NEGATIVE 08/02/2022 09:46 PM     ANTIGBM:No results found for: Juancarlos Keen 135 as available. ASSESSMENT    #Acute encephalopathy, possible dialysis disequilibrium syndrome along with UTI  # ESRD on hemodialysis, unknown etiology, although lab data upon arrival inconsistent with the typical lab data 1 would see in an ESRD patient, as far as the creatinine is concerned. #UTI urine culture growing ESBL and VRE  #Type 2 diabetes, uncontrolled  #ERICK  #Chronic hypoxic respiratory failure    PLAN      -Continue to monitor kidney function while off dialysis. We will have family meeting to discuss dialysis plan once out of the hospital, likely will need to be on an ongoing monitoring regimen vs possibly a short 2-2.5 hour dialysis twice a week. Family meeting at 2 PM on 8/6/2022  Continue strict I's and O's  -Patient got midline for IV antibiotics she is on Zyvox and Merrem per ID. Midline is in right arm  -Neurology following  -Please wait for attending attestation    Please do not hesitate to call with questions. Electronically signed by Arely Casillas DO on 8/6/2022 at 1:55 PM     Attending Physician Statement  I have discussed the care of Jordan Ndiaye, including pertinent history and exam findings with the resident/fellow. I have reviewed the key elements of all parts of the encounter with the resident/fellow. I have seen and examined the patient with the resident/fellow. I agree with the assessment and plan and status of the problem list as documented. Addiitionally I met with the family today including the patient and her 2 sisters. Different scenarios were discussed and the etiology of the patient's likely dialysis disequilibrium syndrome was discussed. The patient no longer wants to be on hemodialysis secondary to her fear of mental status change and worsening memory. The patient's creatinine has risen slowly over this week and we will check her basic metabolic panel tomorrow to see if it is plateaued.   I do believe the patient's neurological decompensation during and or after dialysis is likely tied to the dialysis treatment.   The four options would be   no dialysis and no changes in medications  No dialysis with the use of high-dose diuretics for volume control  Look at peritoneal dialysis so that the patient would have a smoother treatment program and also 1 could factor in the patient's residual kidney function more appropriately as part of her peritoneal dialysis regimen  Go back to hemodialysis but do less frequent/shorter treatments to help limit the episodes of dialysis disequilibrium syndrome   I will need to speak to her primary nephrologist early next week, Dr. Kriss Banerjee MD   Nephrology Attending Physician  [facility]  8/6/2022

## 2022-08-07 LAB
GLUCOSE BLD-MCNC: 153 MG/DL (ref 65–105)
GLUCOSE BLD-MCNC: 192 MG/DL (ref 65–105)
GLUCOSE BLD-MCNC: 216 MG/DL (ref 65–105)
GLUCOSE BLD-MCNC: 233 MG/DL (ref 65–105)

## 2022-08-07 PROCEDURE — 6360000002 HC RX W HCPCS: Performed by: INTERNAL MEDICINE

## 2022-08-07 PROCEDURE — 6360000002 HC RX W HCPCS: Performed by: FAMILY MEDICINE

## 2022-08-07 PROCEDURE — 2580000003 HC RX 258: Performed by: NURSE PRACTITIONER

## 2022-08-07 PROCEDURE — 97535 SELF CARE MNGMENT TRAINING: CPT

## 2022-08-07 PROCEDURE — 6370000000 HC RX 637 (ALT 250 FOR IP): Performed by: FAMILY MEDICINE

## 2022-08-07 PROCEDURE — 6370000000 HC RX 637 (ALT 250 FOR IP): Performed by: NURSE PRACTITIONER

## 2022-08-07 PROCEDURE — 99232 SBSQ HOSP IP/OBS MODERATE 35: CPT | Performed by: INTERNAL MEDICINE

## 2022-08-07 PROCEDURE — 6370000000 HC RX 637 (ALT 250 FOR IP): Performed by: INTERNAL MEDICINE

## 2022-08-07 PROCEDURE — 1200000000 HC SEMI PRIVATE

## 2022-08-07 PROCEDURE — 82947 ASSAY GLUCOSE BLOOD QUANT: CPT

## 2022-08-07 PROCEDURE — 2580000003 HC RX 258: Performed by: INTERNAL MEDICINE

## 2022-08-07 PROCEDURE — 99222 1ST HOSP IP/OBS MODERATE 55: CPT | Performed by: PHYSICAL MEDICINE & REHABILITATION

## 2022-08-07 RX ORDER — UREA 10 %
3 LOTION (ML) TOPICAL NIGHTLY PRN
Status: DISCONTINUED | OUTPATIENT
Start: 2022-08-07 | End: 2022-08-09 | Stop reason: HOSPADM

## 2022-08-07 RX ADMIN — INSULIN LISPRO 1 UNITS: 100 INJECTION, SOLUTION INTRAVENOUS; SUBCUTANEOUS at 17:41

## 2022-08-07 RX ADMIN — Medication 3 MG: at 22:49

## 2022-08-07 RX ADMIN — FUROSEMIDE 80 MG: 40 TABLET ORAL at 08:51

## 2022-08-07 RX ADMIN — ASPIRIN 81 MG: 81 TABLET, COATED ORAL at 08:50

## 2022-08-07 RX ADMIN — PANTOPRAZOLE SODIUM 40 MG: 40 TABLET, DELAYED RELEASE ORAL at 08:50

## 2022-08-07 RX ADMIN — HEPARIN SODIUM 5000 UNITS: 5000 INJECTION INTRAVENOUS; SUBCUTANEOUS at 17:03

## 2022-08-07 RX ADMIN — INSULIN LISPRO 3 UNITS: 100 INJECTION, SOLUTION INTRAVENOUS; SUBCUTANEOUS at 08:54

## 2022-08-07 RX ADMIN — MEROPENEM 1000 MG: 1 INJECTION, POWDER, FOR SOLUTION INTRAVENOUS at 17:06

## 2022-08-07 RX ADMIN — FEBUXOSTAT 40 MG: 40 TABLET ORAL at 08:51

## 2022-08-07 RX ADMIN — SODIUM BICARBONATE 650 MG: 648 TABLET ORAL at 17:03

## 2022-08-07 RX ADMIN — INSULIN LISPRO 10 UNITS: 100 INJECTION, SOLUTION INTRAVENOUS; SUBCUTANEOUS at 08:53

## 2022-08-07 RX ADMIN — RANOLAZINE 1000 MG: 500 TABLET, FILM COATED, EXTENDED RELEASE ORAL at 22:13

## 2022-08-07 RX ADMIN — SODIUM CHLORIDE, PRESERVATIVE FREE 10 ML: 5 INJECTION INTRAVENOUS at 09:01

## 2022-08-07 RX ADMIN — DOCUSATE SODIUM 100 MG: 100 CAPSULE ORAL at 08:50

## 2022-08-07 RX ADMIN — SODIUM CHLORIDE, PRESERVATIVE FREE 10 ML: 5 INJECTION INTRAVENOUS at 22:13

## 2022-08-07 RX ADMIN — INSULIN GLARGINE 55 UNITS: 100 INJECTION, SOLUTION SUBCUTANEOUS at 08:53

## 2022-08-07 RX ADMIN — INSULIN GLARGINE 55 UNITS: 100 INJECTION, SOLUTION SUBCUTANEOUS at 22:12

## 2022-08-07 RX ADMIN — HEPARIN SODIUM 5000 UNITS: 5000 INJECTION INTRAVENOUS; SUBCUTANEOUS at 22:13

## 2022-08-07 RX ADMIN — RANOLAZINE 1000 MG: 500 TABLET, FILM COATED, EXTENDED RELEASE ORAL at 08:50

## 2022-08-07 RX ADMIN — SODIUM BICARBONATE 650 MG: 648 TABLET ORAL at 22:13

## 2022-08-07 RX ADMIN — CARVEDILOL 3.12 MG: 3.12 TABLET, FILM COATED ORAL at 08:51

## 2022-08-07 RX ADMIN — INSULIN LISPRO 10 UNITS: 100 INJECTION, SOLUTION INTRAVENOUS; SUBCUTANEOUS at 12:03

## 2022-08-07 RX ADMIN — TAMSULOSIN HYDROCHLORIDE 0.4 MG: 0.4 CAPSULE ORAL at 08:50

## 2022-08-07 RX ADMIN — FUROSEMIDE 80 MG: 40 TABLET ORAL at 22:13

## 2022-08-07 RX ADMIN — DOCUSATE SODIUM 100 MG: 100 CAPSULE ORAL at 22:13

## 2022-08-07 RX ADMIN — ATORVASTATIN CALCIUM 10 MG: 10 TABLET, FILM COATED ORAL at 08:50

## 2022-08-07 RX ADMIN — LINEZOLID 600 MG: 600 TABLET, FILM COATED ORAL at 08:50

## 2022-08-07 RX ADMIN — LINEZOLID 600 MG: 600 TABLET, FILM COATED ORAL at 22:13

## 2022-08-07 RX ADMIN — SODIUM BICARBONATE 650 MG: 648 TABLET ORAL at 08:50

## 2022-08-07 RX ADMIN — INSULIN LISPRO 10 UNITS: 100 INJECTION, SOLUTION INTRAVENOUS; SUBCUTANEOUS at 17:04

## 2022-08-07 RX ADMIN — HEPARIN SODIUM 5000 UNITS: 5000 INJECTION INTRAVENOUS; SUBCUTANEOUS at 06:21

## 2022-08-07 RX ADMIN — CARVEDILOL 3.12 MG: 3.12 TABLET, FILM COATED ORAL at 17:03

## 2022-08-07 ASSESSMENT — PAIN DESCRIPTION - ORIENTATION: ORIENTATION: LEFT;RIGHT

## 2022-08-07 ASSESSMENT — PAIN SCALES - GENERAL: PAINLEVEL_OUTOF10: 0

## 2022-08-07 ASSESSMENT — ENCOUNTER SYMPTOMS
CHEST TIGHTNESS: 0
ABDOMINAL PAIN: 0
SINUS PAIN: 0
COUGH: 0
DIARRHEA: 0
CHOKING: 0
EYE PAIN: 0
COLOR CHANGE: 0
SHORTNESS OF BREATH: 0
CONSTIPATION: 0
EYE ITCHING: 0
ABDOMINAL DISTENTION: 0
RHINORRHEA: 0
BACK PAIN: 0

## 2022-08-07 ASSESSMENT — PAIN DESCRIPTION - LOCATION: LOCATION: FOOT;HAND

## 2022-08-07 ASSESSMENT — PAIN - FUNCTIONAL ASSESSMENT: PAIN_FUNCTIONAL_ASSESSMENT: ACTIVITIES ARE NOT PREVENTED

## 2022-08-07 ASSESSMENT — PAIN DESCRIPTION - DESCRIPTORS: DESCRIPTORS: NUMBNESS;TINGLING

## 2022-08-07 NOTE — PROGRESS NOTES
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  Suspected to be Dialysis disequilibrium syndrome  Chronic UTI - VRE and ESBL klebsiella    Other:  ESRD  Type 2 DM  ERICK  Chronic hypoxic respiratory failure  anemia  Discussion / summary of stay / plan of care     Recommendations   zyvox and meropenem for -7 days till 8/10  Will need midline on discharge - ordered  Nephrology - pending alternative dialysis option  Carotid Doppler-less than 50% stenosis bilateral ICA    Infection Control Recommendations   [place] Precautions  Contact Isolation       Antimicrobial Stewardship Recommendations   Simplification of therapy  Targeted therapy    History of Present Illness:   Initial history:  History obtained from chart review  Martinez Nowak is a 57y.o.-year-old female with past medical history of ESRD on hemodialysis, type 2 diabetes, ERICK, chronic hypoxic respiratory failure, history of global amnesia occurring up during dialysis and taking 1 month to resolve. Patient was on dialysis on 8/2 and became confused at the end of the dialysis and was brought to the ER. In the ER patient was confused with global amnesia does not even know her name. Had no focal deficit other than global amnesia. Strength was intact in all the extremities with intact sensation. Patient denied headache, chest pain or shortness of breath or abdominal pain. In the ED patient was hemodynamically stable. Creatinine 1.6, CRP 9.8,  Blood sugar 402, WBC 6.8, urine culture positive for VRE sensitive to linezolid. ESR 87. Negative blood culture. MRI brain showed chronic microvascular disease without any acute intracranial abnormality. EEG is negative for any epileptiform activity.      Echo showed ejection fraction of 60%. Patient was admitted to the observation unit and ID was consulted for VRE positive urine culture. Interval changes  8/7/2022   Patient Vitals for the past 8 hrs:   BP Temp Temp src Pulse Resp SpO2   08/07/22 0804 (!) 136/51 98.5 °F (36.9 °C) Oral 86 19 92 %     CURRENT EVALUATION :8/7/2022    Patient's memory is the same as of yesterday-patient remembers yesterday's events and has everything written down in the paper. Patient is alert, not in acute distress. Afebrile  VS stable    Patient feels better  No complaints  No new issues per RN    Dysuria improved. No complaints at present  Urine culture-8/3 was positive for VRE and Klebsiella - ESBL+  Patient tolerating antimicrobials. Renal monitoring urine output to see if patient will need dialysis  BUN and creatinine improving    Summary of relevant labs: 8/7/2022    Labs:  Creatinine-1.6-2.13-3.10-3.2-->1.8  BUN -05-00-33-34-->13    Blood sugar 162  CRP 9.8  ESR 87    Toxic urinary screen was negative for benzodiazepine methadone and cannabinoid  TSH-1.89    WBC 6.8  Hb 9.9  Plat 216    Micro:  Urine culture 8/3-positive for VRE and urine culture 7/31-positive for Klebsiella-ESBL  blood culture 8/3 - negative so far  Imaging:  Carotid Doppler 8/4-less than 50% stenosis of bilateral ICA    MRI brain 8/3-  Impression   Chronic microvascular disease without acute intracranial abnormality. Chronic sinusitis most pronounced in the right maxillary sinus. I have personally reviewed the past medical history, past surgical history, medications, social history, and family history, and I haveupdated the database accordingly. Discussed with patient, RN, family. Allergies:   Latex and Metformin and related     Review of Systems:     Review of Systems   Constitutional:  Negative for activity change, chills and fever. HENT:  Negative for ear pain, rhinorrhea and sinus pain. Eyes:  Negative for pain and itching. Respiratory:  Negative for cough, choking, chest tightness and shortness of breath. Cardiovascular:  Negative for chest pain and leg swelling. Gastrointestinal:  Negative for abdominal distention, abdominal pain, constipation and diarrhea. Endocrine: Negative. Genitourinary:  Positive for dysuria. Negative for difficulty urinating, enuresis, frequency, hematuria and urgency. Musculoskeletal:  Negative for arthralgias, back pain and joint swelling. Skin:  Negative for color change and pallor. Neurological:  Negative for dizziness, syncope, facial asymmetry and headaches. Hematological: Negative. Psychiatric/Behavioral:  Negative for agitation and behavioral problems. Physical Examination :       Physical Exam  Constitutional:       General: She is not in acute distress. Appearance: Normal appearance. She is obese. She is not ill-appearing or toxic-appearing. HENT:      Right Ear: External ear normal.      Left Ear: External ear normal.      Nose: Nose normal.      Mouth/Throat:      Mouth: Mucous membranes are moist.   Eyes:      Pupils: Pupils are equal, round, and reactive to light. Cardiovascular:      Rate and Rhythm: Normal rate. Pulses: Normal pulses. Heart sounds: Normal heart sounds. Pulmonary:      Effort: Pulmonary effort is normal.      Breath sounds: Normal breath sounds. Abdominal:      Palpations: Abdomen is soft. Tenderness: There is no abdominal tenderness. There is no guarding or rebound. Musculoskeletal:         General: No swelling, tenderness or deformity. Cervical back: Normal range of motion. Skin:     Coloration: Skin is not jaundiced or pale. Findings: No bruising or erythema. Neurological:      General: No focal deficit present. Cranial Nerves: No cranial nerve deficit. Sensory: No sensory deficit. Comments: Has global memory loss. Does not know about the place, time or person. Not in distress.  Motor and sensory is normal.    Psychiatric:         Behavior: Behavior normal.         Thought Content: Thought content normal.       Past Medical History:   No past medical history on file. Past Surgical  History:   No past surgical history on file.     Medications:      insulin glargine  55 Units SubCUTAneous BID    linezolid  600 mg Oral 2 times per day    meropenem  1,000 mg IntraVENous Q24H    insulin lispro  10 Units SubCUTAneous TID     docusate sodium  100 mg Oral BID    [Held by provider] DULoxetine  30 mg Oral Daily    febuxostat  40 mg Oral Daily    pantoprazole  40 mg Oral Daily    ranolazine  1,000 mg Oral BID    insulin lispro  0-4 Units SubCUTAneous TID WC    insulin lispro  0-4 Units SubCUTAneous Nightly    heparin (porcine)  5,000 Units SubCUTAneous 3 times per day    aspirin  81 mg Oral Daily    atorvastatin  10 mg Oral Daily    [Held by provider] busPIRone  10 mg Oral TID    carvedilol  3.125 mg Oral BID WC    furosemide  80 mg Oral BID    sodium bicarbonate  650 mg Oral TID    tamsulosin  0.4 mg Oral Daily    [Held by provider] traZODone  50 mg Oral Nightly    sodium chloride flush  5-40 mL IntraVENous 2 times per day       Social History:     Social History     Socioeconomic History    Marital status: Single     Spouse name: Not on file    Number of children: Not on file    Years of education: Not on file    Highest education level: Not on file   Occupational History    Not on file   Tobacco Use    Smoking status: Not on file    Smokeless tobacco: Not on file   Substance and Sexual Activity    Alcohol use: Not on file    Drug use: Not on file    Sexual activity: Not on file   Other Topics Concern    Not on file   Social History Narrative    Not on file     Social Determinants of Health     Financial Resource Strain: Not on file   Food Insecurity: Not on file   Transportation Needs: Not on file   Physical Activity: Not on file   Stress: Not on file   Social Connections: Not on file   Intimate Partner Violence: Not on file   Housing Stability: Not on file       Family History:   No family history on file. Medical Decision Making:   I have independently reviewed/ordered the following labs:    CBC with Differential:   Recent Labs     08/05/22  1050   WBC 5.7   HGB 8.7*   HCT 27.8*      LYMPHOPCT 15*   MONOPCT 7       BMP:  Recent Labs     08/05/22  1050      K 3.9      CO2 22   BUN 34*   CREATININE 3.20*       Hepatic Function Panel:   No results for input(s): PROT, LABALBU, BILIDIR, IBILI, BILITOT, ALKPHOS, ALT, AST in the last 72 hours. No results for input(s): RPR in the last 72 hours. No results for input(s): HIV in the last 72 hours. No results for input(s): BC in the last 72 hours. Lab Results   Component Value Date/Time    CREATININE 3.20 08/05/2022 10:50 AM    GLUCOSE 323 08/05/2022 10:50 AM       Detailed results: Thank you for allowing us to participate in the care of this patient. Please call with questions. This note is created with the assistance of a speech recognition program.  While intending to generate adocument that actually reflects the content of the visit, the document can still have some errors including those of syntax and sound a like substitutions which may escape proof reading. It such instances, actual meaningcan be extrapolated by contextual diversion.     Alexy Lofton MD  Office: [phone]  Perfect serve / office [phone]

## 2022-08-07 NOTE — PROGRESS NOTES
[facility]  Office: [phone]  Lashay Cadena, DO, Modesta Gonzáles, DO, Mic Bales, DO, Safia Gill, DO, Jakub Jewell MD, Rose Powell MD, Muna Gonzalez MD, Miranda Coyne MD,  Lucita San MD, Erika Mcpherson MD, Ousmane Brady, DO, Jonel Johnston MD,  Jose Angel Bowling MD, Lieutenant El MD, Octavia Siegel, DO, Razia Clements MD, Latonya Alvarado MD, Janet Dejesus MD, Efren Ugarte, DO, Sebastian Hobbs MD, Marilyn Nelson MD, Kenneth Mccarthy, CNP,  Luan Mata, CNP, Negrita Salazar, CNP, Tony Guzmán, CNP, Vitor Vazquez PA-C, Marianne Thorne, [facility], Tae Snyder, CNP, Natalya Falcon, CNP, Pat Olson, CNP, Sharifa Matos, CNP, Lesly Sherman, CNP, Capo Solares, CNS, Mari Cooley, [facility], Clint Neely, CNP, Zara Ndiaye, CNP, Juan Narayan, [facility]           Rúsean CoxJose 19    Progress Note    8/7/2022    3:37 PM    Name:   Martinez Nowak  MRN:     [id number]     Acct:      [de-identified]   Room:   [facility] [place] [address] Day:  2  Admit Date:  8/2/2022  4:33 PM    PCP:   No primary care provider on file. Code Status:  Full Code    Subjective:     C/C:   Chief Complaint   Patient presents with    Altered Mental Status     Interval History Status: improved    Pt feeling a little better today  She is watching mens golf and is able to remember scores   Glucose is better controlled  No fevers, chills, nausea vomiting diarrhea      Brief History:     75-year-old female initially presented to hospital for altered mental status during dialysis. She was found to have UTI, cultures are growing Klebsiella and VRE. She was seen by infectious disease who started her on meropenem and Zyvox. Patient was also seen by nephrology, there was concern for dialysis disequilibrium syndrome so dialysis was held and patient response was monitored.   Otherwise, patient glucose was uncontrolled    Review of Systems:   Pt not a good historian due to on file. Vitals:  BP (!) 136/51   Pulse 86   Temp 98.5 °F (36.9 °C) (Oral)   Resp 19   Ht 5' 5\" (1.651 m)   Wt 224 lb 6.9 oz (101.8 kg)   SpO2 92%   BMI 37.35 kg/m²   Temp (24hrs), Av.4 °F (36.9 °C), Min:98.3 °F (36.8 °C), Max:98.5 °F (36.9 °C)    Recent Labs     22  17122  0807 22  1122   POCGLU 118* 117* 233* 153*         I/O (24Hr): Intake/Output Summary (Last 24 hours) at 2022 153  Last data filed at 2022 1938  Gross per 24 hour   Intake 700 ml   Output 800 ml   Net -100 ml         Labs:  Hematology:  Recent Labs     22  1050   WBC 5.7   RBC 2.70*   HGB 8.7*   HCT 27.8*   .0*   MCH 32.2   MCHC 31.3   RDW 17.2*      MPV 10.1       Chemistry:  Recent Labs     22  1050      K 3.9      CO2 22   GLUCOSE 323*   BUN 34*   CREATININE 3.20*   ANIONGAP 13   LABGLOM 15*   GFRAA 18*   CALCIUM 8.9   PHOS 3.3       Recent Labs     22  0854 22  1203 22  1717 22  0807 22  1122   POCGLU 162* 238* 118* 117* 233* 153*       ABG:  Lab Results   Component Value Date/Time    FIO2 INFORMATION NOT PROVIDED 2022 07:58 AM     Lab Results   Component Value Date/Time    SPECIAL RIGHT HAND 2ML 2022 02:22 AM     Lab Results   Component Value Date/Time    CULTURE NO GROWTH 4 DAYS 2022 02:22 AM       Radiology:  CT HEAD WO CONTRAST    Result Date: 2022  No evidence for acute intracranial hemorrhage, territorial infarction or intracranial mass lesion. Mild chronic microangiopathic ischemic disease. Mild generalized volume loss. MRI BRAIN WO CONTRAST    Result Date: 8/3/2022  Chronic microvascular disease without acute intracranial abnormality. Chronic sinusitis most pronounced in the right maxillary sinus.        Physical Examination:        General appearance:  alert, cooperative and no distress  Mental Status:  she is  oriented to person, knows shes in the hospital but cant remember name. Does not know month. Lungs:  clear to auscultation bilaterally, normal effort  Heart:  regular rate and rhythm, no murmur  Abdomen:  soft, nontender, nondistended, normal bowel sounds, no masses, hepatomegaly, splenomegaly  Extremities:  no edema, redness, tenderness in the calves  Skin:  no gross lesions, rashes, induration    Assessment:        Hospital Problems             Last Modified POA    * (Principal) Dialysis disequilibrium syndrome 8/4/2022 Yes    Vancomycin resistant Enterococcus UTI 8/4/2022 Yes    Acute cystitis without hematuria 8/6/2022 Yes    VRE infection (vancomycin resistant Enterococcus) 8/4/2022 Yes    Infection due to ESBL-producing Klebsiella pneumoniae 8/4/2022 Yes    ESRD (end stage renal disease) ([facility] [address] 8/6/2022 Yes    Encephalopathy 8/3/2022 Yes    Syncope and collapse 8/3/2022 Yes    Class 2 severe obesity due to excess calories with serious comorbidity and body mass index (BMI) of 35.0 to 35.9 in adult [facility]) 8/4/2022 Yes    Type 2 diabetes mellitus with hyperglycemia, with long-term current use of insulin ([facility] [address] 8/4/2022 Yes    Disorientation 3/6/1529 Yes    Metabolic encephalopathy 4/5/1425 Yes   Plan:        Toxic metabolic encephalopathy: 2/2 UTI+/-disequilibrium syndrome, and possible med effect from Zanaflex  and Gabapentin  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 Started on Zyvox and meropenem for 7 days until 8/10/2022. We will hold midline on discharge per infectious disease. Nephrology following, recommend less frequent/shorter HD sessions and eventual transition to peritoneal dialysis in the future. They will discuss with pts primary nephrologist.   Neurology recommend PMR consult and outpt neuropsych testing.    Diabetes mellitus type 2 with hyperglycemia: continue  lantus to  55 units BID lispro 10 units TID with correctional insulin monitor for hypoglycemia adjust every couple of days pending on requirments   Anemia of chronic inflammation: monitor, transfuse as needed  Obesity BMI of 37: Recommend weight loss lifestyle modification  Depression: hold Cymbalta trazodone and Buspar while on zyvox  Gout: Restart allopurinol  Labs, imaging, EKG reviewed  PT OT    Dispo: plans to be discharged to [facility]  Poncho Cruz DO  8/7/2022  3:37 PM Statement Selected

## 2022-08-07 NOTE — CONSULTS
Physical Medicine & Rehabilitation  Consult Note      Admitting Physician: Lore Fowler DO    Primary Care Provider: No primary care provider on file. Reason for Consult:  Acute Inpatient Rehabilitation    Chief Complaint: Confusion during hemodialysis with near syncope    History of Present Illness:  Referring Provider is requesting an evaluation for appropriate placement upon discharge from acute care. Ms. Guilherme Martinez is a 57 y.o. female who was admitted to 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, chronic UTI VRE-Zyvox and meropenem for 7 days until 8/10 will need midline on discharge, nephrology to evaluate alternative dialysis option    Internal medicine-toxic metabolic encephalopathy secondary disequilibrium syndrome possible effect from Zanaflex and gabapentin, nephrology recommending less frequent/shorter hemodialysis sessions and eventual transition to peritoneal dialysis on insulin for diabetes, holding Cymbalta trazodone and BuSpar while on Zyvox, on allopurinol    Nephrology-reviewed options possible peritoneal dialysis or hemodialysis with less frequent/short treatment sessions will discuss with primary nephrologist Dr. Coyle Standard states prefers no dialysis on high-dose diuretics will need to monitor kidney function while off dialysis    Neurology-acute encephalopathy secondary to UTI superimposed on dialysis disequilibrium syndrome outpatient follow-up in 4 weeks    Radiology:  CT HEAD WO CONTRAST    Result Date: 8/2/2022  No evidence for acute intracranial hemorrhage, territorial infarction or intracranial mass lesion.  Mild chronic microangiopathic ischemic disease. Mild generalized volume loss. MRI BRAIN WO CONTRAST    Result Date: 8/3/2022  Chronic microvascular disease without acute intracranial abnormality. Chronic sinusitis most pronounced in the right maxillary sinus. Review of Systems:  Constitutional: negative for anorexia, chills, fatigue, fevers, sweats and weight loss  Eyes: negative for redness and visual disturbance  Ears, nose, mouth, throat, and face: negative for earaches, sore throat and tinnitus  Respiratory: negative for cough and shortness of breath  Cardiovascular: negative for chest pain, dyspnea and palpitations  Gastrointestinal: negative for abdominal pain, change in bowel habits, constipation, nausea and vomiting  Genitourinary:negative for dysuria, frequency, hesitancy and urinary incontinence  Integument/breast: negative for pruritus and rash  Musculoskeletal:negative for muscle weakness and stiff joints  Neurological: negative for dizziness, headaches and weakness  Behavioral/Psych: negative for decreased appetite, depression and fatigue    Functional History:  PTA: Independent with all activities.     Current:  PT:    8/5  Bed mobility  Rolling to Left: Stand by assistance  Supine to Sit: Unable to assess (up to recliner upon arrival)  Sit to Supine: Stand by assistance  Bed Mobility Comments: returned to supine for PICC line  Transfers  Sit to Stand: Contact guard assistance  Stand to sit: Contact guard assistance  Comment: cues for hand placement  Ambulation  Surface: level tile  Assistance: Contact guard assistance  Quality of Gait: wide ELISABET, flexed posture  Distance: 5 ft bed to chair  Comments: Pt refused further mobility stating \"i just dont feel good today\"  More Ambulation?: No    ------    Bed Mobility Training  Bed Mobility Training: No  Supine to Sit: Other (comment) (PINKY as pt sitting on toilet upon arrival and retiring sitting in recliner with alarm on at writer's exit)  Scooting: Modified independent  Restrictions/Precautions  Restrictions/Precautions: General Precautions  Required Braces or Orthoses?: No  Position Activity Restriction  Other position/activity restrictions: up with A    Transfer Training  Transfer Training: Yes  Overall Level of Assistance: Stand-by assistance, Contact-guard assistance, Additional time  Interventions: Verbal cues, Tactile cues (Min cues for B hand placemnent, pt. loretta ROBERTS carryover.)  Sit to Stand: Stand-by assistance  Stand to Sit: Stand-by assistance  Stand Pivot Transfers: Stand-by assistance  Toilet Transfer: Stand-by assistance  Bed mobility  Rolling to Left: Stand by assistance  Supine to Sit: Unable to assess (up to recliner upon arrival)  Sit to Supine: Stand by assistance  Bed Mobility Comments: returned to supine for PICC line    Gait  Overall Level of Assistance: Contact-guard assistance, Stand-by assistance (House hold distance, room distances, to and from bathroom. RW)  Speed/Annie: Slow  Assistive Device: Walker, rolling (Will attempt without RW next tx session)  Transfers  Sit to Stand: Contact guard assistance  Stand to sit: Contact guard assistance  Comment: cues for hand placement         OT:   8/7  ADL  Grooming: Stand by assistance;Contact guard assistance  Grooming Skilled Clinical Factors: Standing at sink, SBA-CGA to ensure stability. I with action of brushing teeth/washing face with increased time and effort. Needing sitting rest break after action d/t 10/10 back pain. LE Dressing: Contact guard assistance  LE Dressing Skilled Clinical Factors: Don B socks sitting in recliner chair CGA using 4 figure tech with G carryover. Toileting: Stand by assistance  Toileting Skilled Clinical Factors: Toilet transfer- SBA, no need to void.   Additional Comments: Pt loretta ROBERTS tolerance to grooming activity.      ----  ADL  Feeding: Modified independent  (Before looking under lid, pt remembered what she had ordered for breakfast \"tuttle and english muffin\", pt was provided personal reading glasses to read cream cheese label, (I) buttered muffin and opened milk container easily)  Grooming: Stand by assistance, Contact guard assistance  Grooming Skilled Clinical Factors: Standing at sink, SBA-CGA to ensure stability. I with action of brushing teeth/washing face with increased time and effort. Needing sitting rest break after action d/t 10/10 back pain. UE Bathing: Supervision  LE Bathing: Supervision  UE Dressing: Supervision  LE Dressing: Contact guard assistance  LE Dressing Skilled Clinical Factors: Don B socks sitting in recliner chair CGA using 4 figure tech with G carryover. Toileting: Stand by assistance  Toileting Skilled Clinical Factors: Toilet transfer- SBA, no need to void. Additional Comments: Pt demo F tolerance to grooming activity. Toilet Transfer: Stand-by assistance    ST:      Past Medical History:    No past medical history on file. Past Surgical History:    No past surgical history on file. Allergies:     Allergies   Allergen Reactions    Latex Hives    Metformin And Related Diarrhea        Current Medications:   Current Facility-Administered Medications: insulin glargine (LANTUS) injection vial 55 Units, 55 Units, SubCUTAneous, BID  linezolid (ZYVOX) tablet 600 mg, 600 mg, Oral, 2 times per day  meropenem (MERREM) 1,000 mg in sodium chloride 0.9 % 100 mL IVPB (mini-bag), 1,000 mg, IntraVENous, Q24H  insulin lispro (HUMALOG) injection vial 10 Units, 10 Units, SubCUTAneous, TID WC  docusate sodium (COLACE) capsule 100 mg, 100 mg, Oral, BID  [Held by provider] DULoxetine (CYMBALTA) extended release capsule 30 mg, 30 mg, Oral, Daily  febuxostat (ULORIC) tablet 40 mg, 40 mg, Oral, Daily  pantoprazole (PROTONIX) tablet 40 mg, 40 mg, Oral, Daily  ranolazine (RANEXA) extended release tablet 1,000 mg, 1,000 mg, Oral, BID  insulin lispro (HUMALOG) injection vial 0-4 Units, 0-4 Units, SubCUTAneous, TID WC  insulin lispro (HUMALOG) injection vial 0-4 Not on file     Social Determinants of Health     Financial Resource Strain: Not on file   Food Insecurity: Not on file   Transportation Needs: Not on file   Physical Activity: Not on file   Stress: Not on file   Social Connections: Not on file   Intimate Partner Violence: Not on file   Housing Stability: Not on file     Social/Functional History  Lives With: Alone  Type of Home: [address] [facility] [place]: One level  Bathroom Shower/Tub: Walk-in shower, Shower chair with back  [employer]: Grab bars in [address] [place]: [address], [address], [address], [place], 4 wheeled, Walker, standard, [address] 450 bed  [employer] Help From: Family  ADL Assistance: Needs assistance  Toileting: Independent  Homemaking Assistance: Needs assistance  Ambulation Assistance: Independent  Transfer Assistance: Independent  Active : No  Occupation: On disability  Additional Comments: Information above obtained from [facility] Winter interview; pt unable to provide social functional hx at this time due to confusion    Family History:   No family history on file. Physical Exam:    BP (!) 136/51   Pulse 86   Temp 98.5 °F (36.9 °C) (Oral)   Resp 19   Ht 5' 5\" (1.651 m)   Wt 224 lb 6.9 oz (101.8 kg)   SpO2 92%   BMI 37.35 kg/m²     General appearance: alert, appears stated age, cooperative, and no distress  HEENT: Normocephalic, without obvious abnormality, atraumatic  Pulm: clear to auscultation bilaterally. Cardiac: regular rate and rhythm, no murmur. Abdomen: soft, non-tender; bowel sounds normal.  MSK: extremities normal, atraumatic, no edema, normal tone. ROM: Functional range of motion upper and distal lower extremities  Mental status/Psych: Alert, orientedX2 thought content appropriate, year 2021, Biden president with choices,  CVs, follows commands, names objects  Skin:     Neuro:    Sensory: Intact in BUE and BLE to soft and pin sensation. Motor: Muscle tone and bulk are normal bilaterally. No pronator drift.   4+/5 sound a like substitutions which may escape proof reading.   In such instances, actual meaning can be extrapolated by contextual diversion

## 2022-08-07 NOTE — PROGRESS NOTES
[facility] -   Infectious diseases evaluation    Progress Note    admission date 8/2/2022    reason for consultation:   Dialysis disequilibrium syndrome with VRE + urine culture    Impression :   Current:  Recurrent recoverable amnesia on dialysis  Suspected to be Dialysis disequilibrium syndrome  Chronic UTI - VRE and ESBL klebsiella    Other:  ESRD  Type 2 DM  ERICK  Chronic hypoxic respiratory failure  anemia  Discussion / summary of stay / plan of care     Recommendations   zyvox and meropenem for -7 days till 8/10  Will need midline on discharge - ordered  Nephrology - pending alternative dialysis option  Carotid Doppler-less than 50% stenosis bilateral ICA    Infection Control Recommendations   [place] Precautions  Contact Isolation       Antimicrobial Stewardship Recommendations   Simplification of therapy  Targeted therapy    History of Present Illness:   Initial history:  History obtained from chart review  Jean Méndez is a 57y.o.-year-old female with past medical history of ESRD on hemodialysis, type 2 diabetes, ERICK, chronic hypoxic respiratory failure, history of global amnesia occurring up during dialysis and taking 1 month to resolve. Patient was on dialysis on 8/2 and became confused at the end of the dialysis and was brought to the ER. In the ER patient was confused with global amnesia does not even know her name. Had no focal deficit other than global amnesia. Strength was intact in all the extremities with intact sensation. Patient denied headache, chest pain or shortness of breath or abdominal pain. In the ED patient was hemodynamically stable. Creatinine 1.6, CRP 9.8,  Blood sugar 402, WBC 6.8, urine culture positive for VRE sensitive to linezolid. ESR 87. Negative blood culture. MRI brain showed chronic microvascular disease without any acute intracranial abnormality. EEG is negative for any epileptiform activity.      Echo itching. Respiratory:  Negative for cough, choking, chest tightness and shortness of breath. Cardiovascular:  Negative for chest pain and leg swelling. Gastrointestinal:  Negative for abdominal distention, abdominal pain, constipation and diarrhea. Endocrine: Negative. Genitourinary:  Positive for dysuria. Negative for difficulty urinating, enuresis, frequency, hematuria and urgency. Musculoskeletal:  Negative for arthralgias, back pain and joint swelling. Skin:  Negative for color change and pallor. Neurological:  Negative for dizziness, syncope, facial asymmetry and headaches. Hematological: Negative. Psychiatric/Behavioral:  Negative for agitation and behavioral problems. Physical Examination :       Physical Exam  Constitutional:       General: She is not in acute distress. Appearance: Normal appearance. She is obese. She is not ill-appearing or toxic-appearing. HENT:      Right Ear: External ear normal.      Left Ear: External ear normal.      Nose: Nose normal.      Mouth/Throat:      Mouth: Mucous membranes are moist.   Eyes:      Pupils: Pupils are equal, round, and reactive to light. Cardiovascular:      Rate and Rhythm: Normal rate. Pulses: Normal pulses. Heart sounds: Normal heart sounds. Pulmonary:      Effort: Pulmonary effort is normal.      Breath sounds: Normal breath sounds. Abdominal:      Palpations: Abdomen is soft. Tenderness: There is no abdominal tenderness. There is no guarding or rebound. Musculoskeletal:         General: No swelling, tenderness or deformity. Cervical back: Normal range of motion. Skin:     Coloration: Skin is not jaundiced or pale. Findings: No bruising or erythema. Neurological:      General: No focal deficit present. Cranial Nerves: No cranial nerve deficit. Sensory: No sensory deficit. Comments: Has global memory loss. Does not know about the place, time or person. Not in distress. Motor and sensory is normal.    Psychiatric:         Behavior: Behavior normal.         Thought Content: Thought content normal.       Past Medical History:   No past medical history on file. Past Surgical  History:   No past surgical history on file.     Medications:      insulin glargine  55 Units SubCUTAneous BID    linezolid  600 mg Oral 2 times per day    meropenem  1,000 mg IntraVENous Q24H    insulin lispro  10 Units SubCUTAneous TID     docusate sodium  100 mg Oral BID    [Held by provider] DULoxetine  30 mg Oral Daily    febuxostat  40 mg Oral Daily    pantoprazole  40 mg Oral Daily    ranolazine  1,000 mg Oral BID    insulin lispro  0-4 Units SubCUTAneous TID WC    insulin lispro  0-4 Units SubCUTAneous Nightly    heparin (porcine)  5,000 Units SubCUTAneous 3 times per day    aspirin  81 mg Oral Daily    atorvastatin  10 mg Oral Daily    [Held by provider] busPIRone  10 mg Oral TID    carvedilol  3.125 mg Oral BID WC    furosemide  80 mg Oral BID    sodium bicarbonate  650 mg Oral TID    tamsulosin  0.4 mg Oral Daily    [Held by provider] traZODone  50 mg Oral Nightly    sodium chloride flush  5-40 mL IntraVENous 2 times per day       Social History:     Social History     Socioeconomic History    Marital status: Single     Spouse name: Not on file    Number of children: Not on file    Years of education: Not on file    Highest education level: Not on file   Occupational History    Not on file   Tobacco Use    Smoking status: Not on file    Smokeless tobacco: Not on file   Substance and Sexual Activity    Alcohol use: Not on file    Drug use: Not on file    Sexual activity: Not on file   Other Topics Concern    Not on file   Social History Narrative    Not on file     Social Determinants of Health     Financial Resource Strain: Not on file   Food Insecurity: Not on file   Transportation Needs: Not on file   Physical Activity: Not on file   Stress: Not on file   Social Connections: Not on file Intimate Partner Violence: Not on file   Housing Stability: Not on file       Family History:   No family history on file. Medical Decision Making:   I have independently reviewed/ordered the following labs:    CBC with Differential:   Recent Labs     08/05/22  1050   WBC 5.7   HGB 8.7*   HCT 27.8*      LYMPHOPCT 15*   MONOPCT 7       BMP:  Recent Labs     08/04/22  0949 08/05/22  1050    137   K 4.4 3.9   CL 99 102   CO2 23 22   BUN 29* 34*   CREATININE 3.10* 3.20*       Hepatic Function Panel:   No results for input(s): PROT, LABALBU, BILIDIR, IBILI, BILITOT, ALKPHOS, ALT, AST in the last 72 hours. No results for input(s): RPR in the last 72 hours. No results for input(s): HIV in the last 72 hours. No results for input(s): BC in the last 72 hours. Lab Results   Component Value Date/Time    CREATININE 3.20 08/05/2022 10:50 AM    GLUCOSE 323 08/05/2022 10:50 AM       Detailed results: Thank you for allowing us to participate in the care of this patient. Please call with questions. This note is created with the assistance of a speech recognition program.  While intending to generate adocument that actually reflects the content of the visit, the document can still have some errors including those of syntax and sound a like substitutions which may escape proof reading. It such instances, actual meaningcan be extrapolated by contextual diversion. Eden Goel MD  PGY-3, Department of Internal Medicine  [address] [place] [address]    Above pending discussion with ID attending Dr. Avani Weems. Please see his final attestation below.

## 2022-08-07 NOTE — PLAN OF CARE
Problem: ABCDS Injury Assessment  Goal: Absence of physical injury  8/7/2022 0447 by Rc Shah RN  Outcome: Progressing  8/6/2022 1958 by Miguel A Kelsey RN  Outcome: Progressing  Flowsheets (Taken 8/6/2022 1100)  Absence of Physical Injury: Implement safety measures based on patient assessment     Problem: Safety - Adult  Goal: Free from fall injury  8/7/2022 0447 by Rc Shah RN  Outcome: Progressing  8/6/2022 1958 by Miguel A Kelsey RN  Outcome: Progressing  Flowsheets (Taken 8/6/2022 1100)  Free From Fall Injury:   Instruct family/caregiver on patient safety   Based on caregiver fall risk screen, instruct family/caregiver to ask for assistance with transferring infant if caregiver noted to have fall risk factors     Problem: Skin/Tissue Integrity  Goal: Absence of new skin breakdown  Description: 1. Monitor for areas of redness and/or skin breakdown  2. Assess vascular access sites hourly  3. Every 4-6 hours minimum:  Change oxygen saturation probe site  4. Every 4-6 hours:  If on nasal continuous positive airway pressure, respiratory therapy assess nares and determine need for appliance change or resting period.   8/7/2022 0447 by Rc Shah RN  Outcome: Progressing  8/6/2022 1958 by Miguel A Kelsey RN  Outcome: Progressing     Problem: Chronic Conditions and Co-morbidities  Goal: Patient's chronic conditions and co-morbidity symptoms are monitored and maintained or improved  8/7/2022 0447 by Rc Shah RN  Outcome: Progressing  8/6/2022 1958 by Miguel A Kelsey RN  Outcome: Progressing     Problem: Pain  Goal: Verbalizes/displays adequate comfort level or baseline comfort level  8/7/2022 0447 by Rc Shah RN  Outcome: Progressing  8/6/2022 1958 by Miguel A Kelsey RN  Outcome: Progressing

## 2022-08-07 NOTE — PROGRESS NOTES
Nephrology Progress Note      SUBJECTIVE    Brief history: 26-year-old female past medical history of ESRD on hemodialysis, type 2 diabetes, ERICK, chronic hypoxic respiratory failure, history of global amnesia occurring up to 10 times during dialysis which is taken up to a month to resolve. Nephrology consulted for global amnesia during dialysis and ESRD on HD. Patient seen and examined. This morning she is hemodynamically stable, saturating 92% on room air. She had 800 mL urine output yesterday. Labs this morning still pending. No acute events overnight. Patient states she has urinated twice this morning and feels she is putting out more than before. She states her dysuria has also greatly improved. Patient has received a midline catheter for IV antibiotics for her UTI. Culture growing VRE and ESBL. She is on Zyvox and meropenem until 8/10/2022 per ID recommendations. OBJECTIVE      CURRENT TEMPERATURE:  Temp: 98.5 °F (36.9 °C)  MAXIMUM TEMPERATURE OVER 24HRS:  Temp (24hrs), Av.4 °F (36.9 °C), Min:98.3 °F (36.8 °C), Max:98.5 °F (36.9 °C)    CURRENT RESPIRATORY RATE:  Resp: 19  CURRENT PULSE:  Heart Rate: 86  CURRENT BLOOD PRESSURE:  BP: (!) 136/51  24HR BLOOD PRESSURE RANGE:  Systolic (12FZP), SHC:035 , Min:97 , ZEY:816   ; Diastolic (97FOD), SMI:42, Min:39, Max:51    24HR INTAKE/OUTPUT:    Intake/Output Summary (Last 24 hours) at 2022 0936  Last data filed at 2022 1938  Gross per 24 hour   Intake 700 ml   Output 800 ml   Net -100 ml         PHYSICAL EXAM      Physical Exam:  General:  Pleasant and cooperative. No apparent distress. HEENT:  Normocephalic, atraumatic, mucus membranes moist.   Neck:  Trachea midline, no JVD. Chest:  Clear to auscultation bilaterally. No wheezes, rales, or rhonchi. CV:  Regular rate and rhythm. No murmur, no gallops, no rubs  Abdomen:  Abdomen is soft, non-tender, non-distended, no rebound or guarding.   Extremities: Trace lower extremity edema, peripheral pulses intact  Neurological:  AAOx3. No focal deficits. Skin:  Warm and dry.   Edema}    CURRENT MEDICATIONS      insulin glargine (LANTUS) injection vial 55 Units, BID  linezolid (ZYVOX) tablet 600 mg, 2 times per day  meropenem (MERREM) 1,000 mg in sodium chloride 0.9 % 100 mL IVPB (mini-bag), Q24H  insulin lispro (HUMALOG) injection vial 10 Units, TID WC  docusate sodium (COLACE) capsule 100 mg, BID  [Held by provider] DULoxetine (CYMBALTA) extended release capsule 30 mg, Daily  febuxostat (ULORIC) tablet 40 mg, Daily  pantoprazole (PROTONIX) tablet 40 mg, Daily  ranolazine (RANEXA) extended release tablet 1,000 mg, BID  insulin lispro (HUMALOG) injection vial 0-4 Units, TID WC  insulin lispro (HUMALOG) injection vial 0-4 Units, Nightly  glucose chewable tablet 16 g, PRN  dextrose bolus 10% 125 mL, PRN   Or  dextrose bolus 10% 250 mL, PRN  glucagon (rDNA) injection 1 mg, PRN  dextrose 10 % infusion, Continuous PRN  heparin (porcine) injection 5,000 Units, 3 times per day  aspirin EC tablet 81 mg, Daily  atorvastatin (LIPITOR) tablet 10 mg, Daily  [Held by provider] busPIRone (BUSPAR) tablet 10 mg, TID  carvedilol (COREG) tablet 3.125 mg, BID WC  furosemide (LASIX) tablet 80 mg, BID  sodium bicarbonate tablet 650 mg, TID  tamsulosin (FLOMAX) capsule 0.4 mg, Daily  [Held by provider] traZODone (DESYREL) tablet 50 mg, Nightly  sodium chloride flush 0.9 % injection 5-40 mL, 2 times per day  sodium chloride flush 0.9 % injection 10 mL, PRN  0.9 % sodium chloride infusion, PRN  ondansetron (ZOFRAN-ODT) disintegrating tablet 4 mg, Q8H PRN   Or  ondansetron (ZOFRAN) injection 4 mg, Q6H PRN  polyethylene glycol (GLYCOLAX) packet 17 g, Daily PRN  acetaminophen (TYLENOL) tablet 650 mg, Q6H PRN   Or  acetaminophen (TYLENOL) suppository 650 mg, Q6H PRN        LABS      CBC:   Recent Labs     08/05/22  1050   WBC 5.7   RBC 2.70*   HGB 8.7*   HCT 27.8*   .0*   MCH 32.2   MCHC 31.3   RDW 17.2*      MPV 10.1        BMP:   Recent Labs     08/04/22  0949 08/05/22  1050    137   K 4.4 3.9   CL 99 102   CO2 23 22   BUN 29* 34*   CREATININE 3.10* 3.20*   GLUCOSE 469* 323*   CALCIUM 9.1 8.9        BNP:No results found for: BNP  PHOSPHORUS:    Recent Labs     08/05/22  1050   PHOS 3.3       MAGNESIUM:   No results for input(s): MG in the last 72 hours. ALBUMIN:   No results for input(s): LABALBU in the last 72 hours. IRON:  No results found for: IRON  IRON SATURATION:  No results found for: LABIRON  TIBC:  No results found for: TIBC  FERRITIN:  No results found for: FERRITIN  RUSSELL:   Lab Results   Component Value Date    RUSSELL NEGATIVE 08/03/2022         SPEP:   Lab Results   Component Value Date/Time    PROT 7.4 08/02/2022 05:13 PM     UPEP: No results found for: TPU   HEPBSAG:No results found for: HEPBSAG  HEPCAB:No results found for: HEPCAB  C3: No results found for: C3  C4: No results found for: C4  MPO ANCA: No results found for: MPO .   PR3 ANCA:  No results found for: PR3  URINE SODIUM:  No results found for: JASON   URINE POTASSIUM:  No results found for: KUR  URINE CHLORIDE:  No results found for: CLU  URINE PH:  No components found for: PO4U  URINE OSMOLARITY:  No results found for: OSMOU  URINE CREATININE:  No results found for: LABCREA  URINE EOSINOPHILS: No components found for: EOSU  URINE PROTEIN:  No results found for: TPU  URINALYSIS:  U/A:   Lab Results   Component Value Date/Time    NITRU NEGATIVE 08/02/2022 09:46 PM    COLORU Dark Yellow 08/02/2022 09:46 PM    PHUR 6.5 08/02/2022 09:46 PM    WBCUA TOO NUMEROUS TO COUNT 08/02/2022 09:46 PM    RBCUA 2 TO 5 08/02/2022 09:46 PM    BACTERIA MANY 08/02/2022 09:46 PM    SPECGRAV 1.016 08/02/2022 09:46 PM    LEUKOCYTESUR LARGE 08/02/2022 09:46 PM    UROBILINOGEN Normal 08/02/2022 09:46 PM    BILIRUBINUR NEGATIVE 08/02/2022 09:46 PM    GLUCOSEU NEGATIVE 08/02/2022 09:46 PM    KETUA NEGATIVE 08/02/2022 09:46 PM     ANTIGBM:No results found for: GBMABIGG      RADIOLOGY      Reviewed as available. ASSESSMENT    #Acute encephalopathy, possible dialysis disequilibrium syndrome along with UTI  # ESRD on hemodialysis, unknown etiology, although lab data upon arrival inconsistent with the typical lab data 1 would see in an ESRD patient, as far as the creatinine is concerned. #UTI urine culture growing ESBL and VRE  #Type 2 diabetes, uncontrolled  #ERICK  #Chronic hypoxic respiratory failure    PLAN    -Family meeting Held:   I do believe the patient's neurological decompensation during and or after dialysis is likely tied to the dialysis treatment. The four options would be   no dialysis and no changes in medications  No dialysis with the use of high-dose diuretics for volume control  Look at peritoneal dialysis so that the patient would have a smoother treatment program and also 1 could factor in the patient's residual kidney function more appropriately as part of her peritoneal dialysis regimen  Go back to hemodialysis but do less frequent/shorter treatments to help limit the episodes of dialysis disequilibrium syndrome   We will need to speak to her primary nephrologist early next week, Dr. Veronica Wilkerson.  -This morning the patient states she prefers no dialysis and high-dose diuretic use option  -Continue to monitor kidney function while off dialysis. Continue strict I's and O's  -Patient got midline for IV antibiotics she is on Zyvox and Merrem per ID. Midline is in right arm  -Neurology following  -Please wait for attending attestation    Please do not hesitate to call with questions. Electronically signed by Nitin Taylor DO on 8/7/2022 at 9:36 AM     Attending Physician Statement  I have discussed the care of Nikolas Herrera, including pertinent history and exam findings with the resident/fellow. I have reviewed the key elements of all parts of the encounter with the resident/fellow. I have seen and examined the patient .   I agree with the assessment and

## 2022-08-07 NOTE — PROGRESS NOTES
Assistance: Stand-by assistance;Contact-guard assistance; Additional time  Interventions: Verbal cues; Tactile cues (Min cues for B hand placement, pt. demo F carryover.)  Sit to Stand: Stand-by assistance  Stand to Sit: Stand-by assistance  Stand Pivot Transfers: Stand-by assistance  Toilet Transfer: Stand-by assistance  Gait  Overall Level of Assistance: Contact-guard assistance;Stand-by assistance (House hold distance, room distances, to and from bathroom. RW)        ADL  Grooming: Stand by assistance;Contact guard assistance  Grooming Skilled Clinical Factors: Standing at sink, SBA-CGA to ensure stability. I with action of brushing teeth/washing face with increased time and effort. Needing sitting rest break after action d/t 10/10 back pain. LE Dressing: Contact guard assistance  LE Dressing Skilled Clinical Factors: Don B socks sitting in recliner chair CGA using 4 figure tech with G carryover. Toileting: Stand by assistance  Toileting Skilled Clinical Factors: Toilet transfer- SBA, no need to void. Additional Comments: Pt loretta ROBERTS tolerance to grooming activity. Cognition  Overall Cognitive Status: Exceptions  Arousal/Alertness: Appropriate responses to stimuli  Memory: Decreased short term memory;Decreased long term memory;Decreased recall of recent events  Insights: Decreased awareness of deficits  Initiation: Does not require cues  Sequencing: Does not require cues  Cognition Comment: Pt demonstrates significant confusion throughout session, however, is able to acknowledge her awareness that is disoriented and has significant cognition deficits with short term/long term memory. Pt. did not need any cues for sequensing or initation of functional tasks.   Orientation  Overall Orientation Status: Impaired  Orientation Level: Oriented to person;Disoriented to time;Disoriented to situation;Disoriented to place                  Education Given To: Patient  Education Provided: Role of Therapy;Plan of Care;Precautions  Education Method: Verbal  Barriers to Learning: Cognition  Education Outcome: Verbalized understanding;Continued education needed                                                                AM-PAC Score        AM-PAC Inpatient Daily Activity Raw Score: 19 (08/07/22 1227)  AM-PAC Inpatient ADL T-Scale Score : 40.22 (08/07/22 1227)  ADL Inpatient CMS 0-100% Score: 42.8 (08/07/22 1227)  ADL Inpatient CMS G-Code Modifier : CK (08/07/22 1227)           Goals  Short Term Goals  Time Frame for Short term goals: Pt will by discharge  Short Term Goal 1: demo ADL UB bathing/dressing activity at (I), including setup, no cues  Short Term Goal 2: demo ADL LB bathing/dressing activity at mod (I), using sock-aid/reacher PRN, including setup, no cues  Short Term Goal 3: demo good safety awareness during func mob around room at mod I using LRD PRN  Short Term Goal 4: demo accuracy of 8/10 during \"What's Wrong? \" cards to identify errors during daily tasks  Short Term Goal 5: demo standing during func activity for 10 min+ using LRD PRN and mod I  Short Term Goal 6: demo 6-step func activity with 1 vc only to address cognitive concerns for ADL performance       Therapy Time   Individual Concurrent Group Co-treatment   Time In 0914         Time Out 0945         Minutes 31         Timed Code Treatment Minutes: [address] [facility], GARCIA/L

## 2022-08-08 LAB
ANION GAP SERPL CALCULATED.3IONS-SCNC: 14 MMOL/L (ref 9–17)
BUN BLDV-MCNC: 34 MG/DL (ref 8–23)
CALCIUM SERPL-MCNC: 9.2 MG/DL (ref 8.6–10.4)
CHLORIDE BLD-SCNC: 106 MMOL/L (ref 98–107)
CO2: 22 MMOL/L (ref 20–31)
CREAT SERPL-MCNC: 3.41 MG/DL (ref 0.5–0.9)
CULTURE: NORMAL
GFR AFRICAN AMERICAN: 16 ML/MIN
GFR NON-AFRICAN AMERICAN: 14 ML/MIN
GFR SERPL CREATININE-BSD FRML MDRD: ABNORMAL ML/MIN/{1.73_M2}
GLUCOSE BLD-MCNC: 110 MG/DL (ref 65–105)
GLUCOSE BLD-MCNC: 123 MG/DL (ref 65–105)
GLUCOSE BLD-MCNC: 123 MG/DL (ref 70–99)
GLUCOSE BLD-MCNC: 184 MG/DL (ref 65–105)
GLUCOSE BLD-MCNC: 230 MG/DL (ref 65–105)
GLUCOSE BLD-MCNC: 49 MG/DL (ref 65–105)
GLUCOSE BLD-MCNC: 53 MG/DL (ref 65–105)
GLUCOSE BLD-MCNC: 60 MG/DL (ref 65–105)
GLUCOSE BLD-MCNC: 77 MG/DL (ref 65–105)
Lab: NORMAL
POTASSIUM SERPL-SCNC: 3.9 MMOL/L (ref 3.7–5.3)
SARS-COV-2, RAPID: NOT DETECTED
SODIUM BLD-SCNC: 142 MMOL/L (ref 135–144)
SPECIMEN DESCRIPTION: NORMAL
SPECIMEN DESCRIPTION: NORMAL

## 2022-08-08 PROCEDURE — 97110 THERAPEUTIC EXERCISES: CPT

## 2022-08-08 PROCEDURE — 36415 COLL VENOUS BLD VENIPUNCTURE: CPT

## 2022-08-08 PROCEDURE — 80048 BASIC METABOLIC PNL TOTAL CA: CPT

## 2022-08-08 PROCEDURE — 6360000002 HC RX W HCPCS: Performed by: FAMILY MEDICINE

## 2022-08-08 PROCEDURE — 2580000003 HC RX 258: Performed by: INTERNAL MEDICINE

## 2022-08-08 PROCEDURE — 6370000000 HC RX 637 (ALT 250 FOR IP): Performed by: INTERNAL MEDICINE

## 2022-08-08 PROCEDURE — 2580000003 HC RX 258: Performed by: NURSE PRACTITIONER

## 2022-08-08 PROCEDURE — 6370000000 HC RX 637 (ALT 250 FOR IP): Performed by: FAMILY MEDICINE

## 2022-08-08 PROCEDURE — 6360000002 HC RX W HCPCS: Performed by: INTERNAL MEDICINE

## 2022-08-08 PROCEDURE — 99232 SBSQ HOSP IP/OBS MODERATE 35: CPT | Performed by: INTERNAL MEDICINE

## 2022-08-08 PROCEDURE — 97116 GAIT TRAINING THERAPY: CPT

## 2022-08-08 PROCEDURE — 6370000000 HC RX 637 (ALT 250 FOR IP): Performed by: NURSE PRACTITIONER

## 2022-08-08 PROCEDURE — 82947 ASSAY GLUCOSE BLOOD QUANT: CPT

## 2022-08-08 PROCEDURE — 1200000000 HC SEMI PRIVATE

## 2022-08-08 RX ORDER — INSULIN LISPRO 100 [IU]/ML
12 INJECTION, SOLUTION INTRAVENOUS; SUBCUTANEOUS
Status: DISCONTINUED | OUTPATIENT
Start: 2022-08-08 | End: 2022-08-09 | Stop reason: HOSPADM

## 2022-08-08 RX ADMIN — INSULIN GLARGINE 55 UNITS: 100 INJECTION, SOLUTION SUBCUTANEOUS at 09:44

## 2022-08-08 RX ADMIN — RANOLAZINE 1000 MG: 500 TABLET, FILM COATED, EXTENDED RELEASE ORAL at 09:41

## 2022-08-08 RX ADMIN — LINEZOLID 600 MG: 600 TABLET, FILM COATED ORAL at 09:41

## 2022-08-08 RX ADMIN — FUROSEMIDE 80 MG: 40 TABLET ORAL at 21:03

## 2022-08-08 RX ADMIN — PANTOPRAZOLE SODIUM 40 MG: 40 TABLET, DELAYED RELEASE ORAL at 09:41

## 2022-08-08 RX ADMIN — LINEZOLID 600 MG: 600 TABLET, FILM COATED ORAL at 21:03

## 2022-08-08 RX ADMIN — ASPIRIN 81 MG: 81 TABLET, COATED ORAL at 09:41

## 2022-08-08 RX ADMIN — HEPARIN SODIUM 5000 UNITS: 5000 INJECTION INTRAVENOUS; SUBCUTANEOUS at 21:03

## 2022-08-08 RX ADMIN — ATORVASTATIN CALCIUM 10 MG: 10 TABLET, FILM COATED ORAL at 09:41

## 2022-08-08 RX ADMIN — SODIUM CHLORIDE, PRESERVATIVE FREE 10 ML: 5 INJECTION INTRAVENOUS at 21:03

## 2022-08-08 RX ADMIN — INSULIN LISPRO 1 UNITS: 100 INJECTION, SOLUTION INTRAVENOUS; SUBCUTANEOUS at 12:11

## 2022-08-08 RX ADMIN — FUROSEMIDE 80 MG: 40 TABLET ORAL at 09:41

## 2022-08-08 RX ADMIN — DOCUSATE SODIUM 100 MG: 100 CAPSULE ORAL at 09:41

## 2022-08-08 RX ADMIN — Medication 16 G: at 20:30

## 2022-08-08 RX ADMIN — MEROPENEM 1000 MG: 1 INJECTION, POWDER, FOR SOLUTION INTRAVENOUS at 16:24

## 2022-08-08 RX ADMIN — FEBUXOSTAT 40 MG: 40 TABLET ORAL at 09:41

## 2022-08-08 RX ADMIN — DOCUSATE SODIUM 100 MG: 100 CAPSULE ORAL at 21:03

## 2022-08-08 RX ADMIN — RANOLAZINE 1000 MG: 500 TABLET, FILM COATED, EXTENDED RELEASE ORAL at 21:03

## 2022-08-08 RX ADMIN — CARVEDILOL 3.12 MG: 3.12 TABLET, FILM COATED ORAL at 09:41

## 2022-08-08 RX ADMIN — HEPARIN SODIUM 5000 UNITS: 5000 INJECTION INTRAVENOUS; SUBCUTANEOUS at 06:12

## 2022-08-08 RX ADMIN — SODIUM BICARBONATE 650 MG: 648 TABLET ORAL at 16:22

## 2022-08-08 RX ADMIN — HEPARIN SODIUM 5000 UNITS: 5000 INJECTION INTRAVENOUS; SUBCUTANEOUS at 16:22

## 2022-08-08 RX ADMIN — Medication 16 G: at 20:55

## 2022-08-08 RX ADMIN — INSULIN LISPRO 12 UNITS: 100 INJECTION, SOLUTION INTRAVENOUS; SUBCUTANEOUS at 17:28

## 2022-08-08 RX ADMIN — CARVEDILOL 3.12 MG: 3.12 TABLET, FILM COATED ORAL at 16:22

## 2022-08-08 RX ADMIN — SODIUM BICARBONATE 650 MG: 648 TABLET ORAL at 21:03

## 2022-08-08 RX ADMIN — INSULIN LISPRO 10 UNITS: 100 INJECTION, SOLUTION INTRAVENOUS; SUBCUTANEOUS at 12:12

## 2022-08-08 RX ADMIN — TAMSULOSIN HYDROCHLORIDE 0.4 MG: 0.4 CAPSULE ORAL at 09:41

## 2022-08-08 RX ADMIN — Medication 3 MG: at 23:25

## 2022-08-08 RX ADMIN — SODIUM BICARBONATE 650 MG: 648 TABLET ORAL at 09:41

## 2022-08-08 RX ADMIN — INSULIN LISPRO 10 UNITS: 100 INJECTION, SOLUTION INTRAVENOUS; SUBCUTANEOUS at 09:44

## 2022-08-08 ASSESSMENT — ENCOUNTER SYMPTOMS
COUGH: 0
DIARRHEA: 0
EYE ITCHING: 0
SHORTNESS OF BREATH: 0
COLOR CHANGE: 0
RHINORRHEA: 0
CONSTIPATION: 0
SINUS PAIN: 0
BACK PAIN: 0
ABDOMINAL PAIN: 0
CHEST TIGHTNESS: 0
EYE PAIN: 0
ABDOMINAL DISTENTION: 0
CHOKING: 0

## 2022-08-08 NOTE — PROGRESS NOTES
Nephrology Progress Note      SUBJECTIVE    Brief history: 66-year-old female past medical history of ESRD on hemodialysis, type 2 diabetes, ERICK, chronic hypoxic respiratory failure, history of global amnesia occurring up to 10 times during dialysis which is taken up to a month to resolve. Nephrology consulted for global amnesia during dialysis and ESRD on HD. On TTS at the [facility] unit    Patient seen and examined. She is hemodynamically stable, saturating 95% on room air. Labs this morning showing continued increasing creatinine however it has leveled off relatively. Currently 3.41 with GFR of 14. Plan to call Dr. Katia Neal patient's outpatient nephrologist to discuss outpatient dialysis versus nondialysis plans. Patient has received a midline catheter for IV antibiotics for her UTI. Culture growing VRE and ESBL. She is on Zyvox and meropenem until 8/10/2022 per ID recommendations. OBJECTIVE      CURRENT TEMPERATURE:  Temp: 97.7 °F (36.5 °C)  MAXIMUM TEMPERATURE OVER 24HRS:  Temp (24hrs), Av.8 °F (36.6 °C), Min:97.7 °F (36.5 °C), Max:97.8 °F (36.6 °C)    CURRENT RESPIRATORY RATE:  Resp: 18  CURRENT PULSE:  Heart Rate: 79  CURRENT BLOOD PRESSURE:  BP: (!) 110/54  24HR BLOOD PRESSURE RANGE:  Systolic (27IWW), FMU:025 , Min:110 , IPW:928   ; Diastolic (83PJX), OXQ:50, Min:54, Max:58    24HR INTAKE/OUTPUT:    Intake/Output Summary (Last 24 hours) at 2022 0929  Last data filed at 2022 2356  Gross per 24 hour   Intake 148.36 ml   Output --   Net 148.36 ml         PHYSICAL EXAM      Physical Exam:  General:  Pleasant and cooperative. No apparent distress. HEENT:  Normocephalic, atraumatic, mucus membranes moist.   Neck:  Trachea midline, no JVD. Chest:  Clear to auscultation bilaterally. No wheezes, rales, or rhonchi. CV:  Regular rate and rhythm. No murmur, no gallops, no rubs  Abdomen:  Abdomen is soft, non-tender, non-distended, no rebound or guarding.   Extremities: Trace lower extremity edema, peripheral pulses intact  Neurological:  AAOx3. No focal deficits. Skin:  Warm and dry.   Edema}    CURRENT MEDICATIONS      melatonin tablet 3 mg, Nightly PRN  insulin glargine (LANTUS) injection vial 55 Units, BID  linezolid (ZYVOX) tablet 600 mg, 2 times per day  meropenem (MERREM) 1,000 mg in sodium chloride 0.9 % 100 mL IVPB (mini-bag), Q24H  insulin lispro (HUMALOG) injection vial 10 Units, TID WC  docusate sodium (COLACE) capsule 100 mg, BID  [Held by provider] DULoxetine (CYMBALTA) extended release capsule 30 mg, Daily  febuxostat (ULORIC) tablet 40 mg, Daily  pantoprazole (PROTONIX) tablet 40 mg, Daily  ranolazine (RANEXA) extended release tablet 1,000 mg, BID  insulin lispro (HUMALOG) injection vial 0-4 Units, TID WC  insulin lispro (HUMALOG) injection vial 0-4 Units, Nightly  glucose chewable tablet 16 g, PRN  dextrose bolus 10% 125 mL, PRN   Or  dextrose bolus 10% 250 mL, PRN  glucagon (rDNA) injection 1 mg, PRN  dextrose 10 % infusion, Continuous PRN  heparin (porcine) injection 5,000 Units, 3 times per day  aspirin EC tablet 81 mg, Daily  atorvastatin (LIPITOR) tablet 10 mg, Daily  [Held by provider] busPIRone (BUSPAR) tablet 10 mg, TID  carvedilol (COREG) tablet 3.125 mg, BID WC  furosemide (LASIX) tablet 80 mg, BID  sodium bicarbonate tablet 650 mg, TID  tamsulosin (FLOMAX) capsule 0.4 mg, Daily  [Held by provider] traZODone (DESYREL) tablet 50 mg, Nightly  sodium chloride flush 0.9 % injection 5-40 mL, 2 times per day  sodium chloride flush 0.9 % injection 10 mL, PRN  0.9 % sodium chloride infusion, PRN  ondansetron (ZOFRAN-ODT) disintegrating tablet 4 mg, Q8H PRN   Or  ondansetron (ZOFRAN) injection 4 mg, Q6H PRN  polyethylene glycol (GLYCOLAX) packet 17 g, Daily PRN  acetaminophen (TYLENOL) tablet 650 mg, Q6H PRN   Or  acetaminophen (TYLENOL) suppository 650 mg, Q6H PRN        LABS      CBC:   Recent Labs     08/05/22  1050   WBC 5.7   RBC 2.70*   HGB 8.7*   HCT 27.8*   .0[facility] 32.2   MCHC 31.3   RDW 17.2*      MPV 10.1        BMP:   Recent Labs     08/05/22  1050 08/08/22  0721    142   K 3.9 3.9    106   CO2 22 22   BUN 34* 34*   CREATININE 3.20* 3.41*   GLUCOSE 323* 123*   CALCIUM 8.9 9.2        BNP:No results found for: BNP  PHOSPHORUS:    Recent Labs     08/05/22  1050   PHOS 3.3       MAGNESIUM:   No results for input(s): MG in the last 72 hours. ALBUMIN:   No results for input(s): LABALBU in the last 72 hours. IRON:  No results found for: IRON  IRON SATURATION:  No results found for: LABIRON  TIBC:  No results found for: TIBC  FERRITIN:  No results found for: FERRITIN  RUSSELL:   Lab Results   Component Value Date    RUSSELL NEGATIVE 08/03/2022         SPEP:   Lab Results   Component Value Date/Time    PROT 7.4 08/02/2022 05:13 PM     UPEP: No results found for: TPU   HEPBSAG:No results found for: HEPBSAG  HEPCAB:No results found for: HEPCAB  C3: No results found for: C3  C4: No results found for: C4  MPO ANCA: No results found for: MPO .   PR3 ANCA:  No results found for: PR3  URINE SODIUM:  No results found for: JASON   URINE POTASSIUM:  No results found for: KUR  URINE CHLORIDE:  No results found for: CLU  URINE PH:  No components found for: PO4U  URINE OSMOLARITY:  No results found for: OSMOU  URINE CREATININE:  No results found for: LABCREA  URINE EOSINOPHILS: No components found for: EOSU  URINE PROTEIN:  No results found for: TPU  URINALYSIS:  U/A:   Lab Results   Component Value Date/Time    NITRU NEGATIVE 08/02/2022 09:46 PM    COLORU Dark Yellow 08/02/2022 09:46 PM    PHUR 6.5 08/02/2022 09:46 PM    WBCUA TOO NUMEROUS TO COUNT 08/02/2022 09:46 PM    RBCUA 2 TO 5 08/02/2022 09:46 PM    BACTERIA MANY 08/02/2022 09:46 PM    SPECGRAV 1.016 08/02/2022 09:46 PM    LEUKOCYTESUR LARGE 08/02/2022 09:46 PM    UROBILINOGEN Normal 08/02/2022 09:46 PM    BILIRUBINUR NEGATIVE 08/02/2022 09:46 PM    GLUCOSEU NEGATIVE 08/02/2022 09:46 PM    KETUA NEGATIVE 08/02/2022 09:46 PM ANTIGBM:No results found for: Juancarlos Keen 135 as available. ASSESSMENT    #Acute encephalopathy, possible dialysis disequilibrium syndrome along with UTI  # ESRD on hemodialysis, unknown etiology, although lab data upon arrival inconsistent with the typical lab data 1 would see in an ESRD patient, as far as the creatinine is concerned. #UTI urine culture growing ESBL and VRE  #Type 2 diabetes, uncontrolled  #ERICK  #Chronic hypoxic respiratory failure    PLAN    -Family meeting Held: The four options spoken about were   no dialysis and no changes in medications  No dialysis with the use of high-dose diuretics for volume control  Look at peritoneal dialysis so that the patient would have a smoother treatment program and also 1 could factor in the patient's residual kidney function more appropriately as part of her peritoneal dialysis regimen  Go back to hemodialysis but do less frequent/shorter treatments to help limit the episodes of dialysis disequilibrium syndrome   -We will need to speak to her primary nephrologist early next week, Dr. Annabella Russell.  -patient states she prefers no dialysis and high-dose diuretic use option  -Continue to monitor kidney function while off dialysis. Continue strict I's and O's  -Patient got midline for IV antibiotics she is on Zyvox and Merrem per ID. Midline is in right arm  -Neurology following  -Please wait for attending attestation    Please do not hesitate to call with questions. Electronically signed by Leeanna Peterson DO on 8/8/2022 at 9:29 AM   Patient seen with resident last dialysis 8/2/2022. Presented with global amnesia. The thought was she may be recovering renal function however since dialysis have been held for nearly a week GFR is dropped down to 14 mm/min. Clearly does not qualify for recovering renal function. Clearly will need dialysis. Discussed with the primary nephrologist it seems this has been an ongoing problem for her.   He

## 2022-08-08 NOTE — PROGRESS NOTES
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  Suspected to be Dialysis disequilibrium syndrome  Chronic UTI - VRE and ESBL klebsiella    Other:  ESRD  Type 2 DM  ERICK  Chronic hypoxic respiratory failure  anemia  Discussion / summary of stay / plan of care     Recommendations   zyvox and meropenem for -7 days till 8/10  WBC and plts are stable  midline for discharge -placed - 8/5  Nephrology - pending alternative dialysis option  Carotid Doppler-less than 50% stenosis bilateral ICA    Infection Control Recommendations   [place] Precautions  Contact Isolation       Antimicrobial Stewardship Recommendations   Simplification of therapy  Targeted therapy    History of Present Illness:   Initial history:  History obtained from chart review  Magdalena Mccloud is a 57y.o.-year-old female with past medical history of ESRD on hemodialysis, type 2 diabetes, ERICK, chronic hypoxic respiratory failure, history of global amnesia occurring up during dialysis and taking 1 month to resolve. Patient was on dialysis on 8/2 and became confused at the end of the dialysis and was brought to the ER. In the ER patient was confused with global amnesia does not even know her name. Had no focal deficit other than global amnesia. Strength was intact in all the extremities with intact sensation. Patient denied headache, chest pain or shortness of breath or abdominal pain. In the ED patient was hemodynamically stable. Creatinine 1.6, CRP 9.8,  Blood sugar 402, WBC 6.8, urine culture positive for VRE sensitive to linezolid. ESR 87. Negative blood culture. MRI brain showed chronic microvascular disease without any acute intracranial abnormality.  EEG is negative for any epileptiform activity. Echo showed ejection fraction of 60%. Patient was admitted to the observation unit and ID was consulted for VRE positive urine culture. Interval changes  8/8/2022   Patient Vitals for the past 8 hrs:   BP Temp Temp src Pulse Resp SpO2 Weight   08/08/22 0822 (!) 110/54 97.7 °F (36.5 °C) Temporal 79 18 95 % --   08/08/22 0252 -- -- -- -- -- -- 230 lb 13.2 oz (104.7 kg)     CURRENT EVALUATION :8/8/2022    Patient's memory is the same as of yesterday-patient remembers yesterday's events and has everything written down in the paper. Patient is alert, not in acute distress. Afebrile  VS stable    Patient states she did not pass urine yesterday - otherwise feels better  No complaints  No new issues per RN    Dysuria improved. No complaints at present  Urine culture-8/3 was positive for VRE and Klebsiella - ESBL+  Patient tolerating antimicrobials. Renal monitoring urine output to see if patient will need dialysis  BUN and creatinine improving    Summary of relevant labs: 8/8/2022    Labs:  Creatinine-1.6-2.13-3.10-3.2--> 3.41  BUN -02-39-34-34--> 34    Blood sugar 123  CRP 9.8  ESR 87    Toxic urinary screen was negative for benzodiazepine methadone and cannabinoid  TSH-1.89    WBC 6.8  Hb 9.9  Plat 216    Micro:  Urine culture 8/3-positive for VRE and urine culture 7/31-positive for Klebsiella-ESBL  blood culture 8/3 - negative so far  Imaging:  Carotid Doppler 8/4-less than 50% stenosis of bilateral ICA    MRI brain 8/3-  Impression   Chronic microvascular disease without acute intracranial abnormality. Chronic sinusitis most pronounced in the right maxillary sinus. I have personally reviewed the past medical history, past surgical history, medications, social history, and family history, and I haveupdated the database accordingly. Discussed with patient, RN, family.     Allergies:   Latex and Metformin and related     Review of Systems:     Review of Systems   Constitutional: Negative for activity change, chills and fever. HENT:  Negative for ear pain, rhinorrhea and sinus pain. Eyes:  Negative for pain and itching. Respiratory:  Negative for cough, choking, chest tightness and shortness of breath. Cardiovascular:  Negative for chest pain and leg swelling. Gastrointestinal:  Negative for abdominal distention, abdominal pain, constipation and diarrhea. Endocrine: Negative. Genitourinary:  Negative for difficulty urinating, dysuria, enuresis, frequency, hematuria and urgency. Musculoskeletal:  Negative for arthralgias, back pain and joint swelling. Skin:  Negative for color change and pallor. Neurological:  Negative for dizziness, syncope, facial asymmetry and headaches. Hematological: Negative. Psychiatric/Behavioral:  Negative for agitation and behavioral problems. Physical Examination :       Physical Exam  Constitutional:       General: She is not in acute distress. Appearance: Normal appearance. She is obese. She is not ill-appearing or toxic-appearing. HENT:      Right Ear: External ear normal.      Left Ear: External ear normal.      Nose: Nose normal.      Mouth/Throat:      Mouth: Mucous membranes are moist.   Eyes:      Pupils: Pupils are equal, round, and reactive to light. Cardiovascular:      Rate and Rhythm: Normal rate. Pulses: Normal pulses. Heart sounds: Normal heart sounds. Pulmonary:      Effort: Pulmonary effort is normal.      Breath sounds: Normal breath sounds. Abdominal:      Palpations: Abdomen is soft. Tenderness: There is no abdominal tenderness. There is no guarding or rebound. Musculoskeletal:         General: No swelling, tenderness or deformity. Cervical back: Normal range of motion. Skin:     Coloration: Skin is not jaundiced or pale. Findings: No bruising or erythema. Neurological:      General: No focal deficit present. Cranial Nerves: No cranial nerve deficit.       Sensory: No sensory deficit. Comments: Has global memory loss. Does not know about the place, time or person-loss of remote memory, does remember the events of the past 2 days. Not in distress. Motor and sensory is normal.    Psychiatric:         Behavior: Behavior normal.         Thought Content: Thought content normal.       Past Medical History:   No past medical history on file. Past Surgical  History:   No past surgical history on file.     Medications:      insulin glargine  55 Units SubCUTAneous BID    linezolid  600 mg Oral 2 times per day    meropenem  1,000 mg IntraVENous Q24H    insulin lispro  10 Units SubCUTAneous TID WC    docusate sodium  100 mg Oral BID    [Held by provider] DULoxetine  30 mg Oral Daily    febuxostat  40 mg Oral Daily    pantoprazole  40 mg Oral Daily    ranolazine  1,000 mg Oral BID    insulin lispro  0-4 Units SubCUTAneous TID WC    insulin lispro  0-4 Units SubCUTAneous Nightly    heparin (porcine)  5,000 Units SubCUTAneous 3 times per day    aspirin  81 mg Oral Daily    atorvastatin  10 mg Oral Daily    [Held by provider] busPIRone  10 mg Oral TID    carvedilol  3.125 mg Oral BID WC    furosemide  80 mg Oral BID    sodium bicarbonate  650 mg Oral TID    tamsulosin  0.4 mg Oral Daily    [Held by provider] traZODone  50 mg Oral Nightly    sodium chloride flush  5-40 mL IntraVENous 2 times per day       Social History:     Social History     Socioeconomic History    Marital status: Single     Spouse name: Not on file    Number of children: Not on file    Years of education: Not on file    Highest education level: Not on file   Occupational History    Not on file   Tobacco Use    Smoking status: Not on file    Smokeless tobacco: Not on file   Substance and Sexual Activity    Alcohol use: Not on file    Drug use: Not on file    Sexual activity: Not on file   Other Topics Concern    Not on file   Social History Narrative    Not on file     Social Determinants of Health     Financial Resource Strain: Not on file   Food Insecurity: Not on file   Transportation Needs: Not on file   Physical Activity: Not on file   Stress: Not on file   Social Connections: Not on file   Intimate Partner Violence: Not on file   Housing Stability: Not on file       Family History:   No family history on file. Medical Decision Making:   I have independently reviewed/ordered the following labs:    CBC with Differential:   Recent Labs     08/05/22  1050   WBC 5.7   HGB 8.7*   HCT 27.8*      LYMPHOPCT 15*   MONOPCT 7       BMP:  Recent Labs     08/05/22  1050 08/08/22  0721    142   K 3.9 3.9    106   CO2 22 22   BUN 34* 34*   CREATININE 3.20* 3.41*       Hepatic Function Panel:   No results for input(s): PROT, LABALBU, BILIDIR, IBILI, BILITOT, ALKPHOS, ALT, AST in the last 72 hours. No results for input(s): RPR in the last 72 hours. No results for input(s): HIV in the last 72 hours. No results for input(s): BC in the last 72 hours. Lab Results   Component Value Date/Time    CREATININE 3.41 08/08/2022 07:21 AM    GLUCOSE 123 08/08/2022 07:21 AM       Detailed results: Thank you for allowing us to participate in the care of this patient. Please call with questions. This note is created with the assistance of a speech recognition program.  While intending to generate adocument that actually reflects the content of the visit, the document can still have some errors including those of syntax and sound a like substitutions which may escape proof reading. It such instances, actual meaningcan be extrapolated by contextual diversion. Gayle Snow MD  Office: [phone]  Perfect serve / office [phone]      I have discussed the care of the patient, including pertinent history and exam findings,  with the resident. I have seen and examined the patient and the key elements of all parts of the encounter have been performed by me.   I agree with the assessment, plan and orders as documented by the resident.     Cindy Caruso, Infectious Diseases

## 2022-08-08 NOTE — PROGRESS NOTES
Physical Therapy  Facility/Department: [facility]  Physical Therapy Daily Treatment Note    Name: Jocelyn Brewer  : 1960  MRN: [id number]  Date of Service: 2022    Discharge Recommendations:  Patient would benefit from continued therapy after discharge   PT Equipment Recommendations  Equipment Needed: Yes  Mobility Devices: Gorman Simone: Rolling      Patient Diagnosis(es): The encounter diagnosis was Disorientation. Past Medical History:  has no past medical history on file. Past Surgical History:  has no past surgical history on file. Assessment   Body Structures, Functions, Activity Limitations Requiring Skilled Therapeutic Intervention: Decreased functional mobility ; Decreased cognition;Decreased endurance;Decreased ROM; Decreased strength;Decreased balance;Decreased posture  Assessment: Pt with mobility deficits requiring CGA to ambulate 30 feet with a RW. Pt demonstrates fair stability during ambulation but complains of sudden onset dizziness with visual aura that limits ambulation distance. Pt is a fall risk secondary to impaired standing balance, decreased endurance. Pt would benefit from additional therapy upon discharge to address aformentioned deficits. Pt will require a RW and 24 hour assistance with mobility upon discharge. Therapy Prognosis: Good  Decision Making: Medium Complexity  Requires PT Follow-Up: Yes  Activity Tolerance  Activity Tolerance: Patient limited by fatigue  Activity Tolerance Comments: Limited secondary to dizziness during ambulation and standing mobility with complaints of visual aura, BP ~130s/80s mmHg throughout.      Plan   Plan  Plan:  (5-6x/week)  Current Treatment Recommendations: Strengthening, Balance training, ROM, Gait training, Equipment evaluation, education, & procurement, Stair training, Functional mobility training, Transfer training, Home exercise program, Endurance training, Safety education & training, Patient/Caregiver education & (130s/80s mmHg)  More Ambulation?: No  Stairs/Curb  Stairs?: No     Balance  Posture: Fair  Sitting - Static: Good;-  Sitting - Dynamic: Fair;+  Standing - Static: Fair  Standing - Dynamic: Fair  Comments: standing balance assessed w/ RW  Exercise Treatment: x10 BLE in standing with unilateral UE support: hip flexion, hip abduction, heel raises, partial squats. x10 BLE in sitting: LAQs, hip flexion, hip abduction, ankle pumps. AM-PAC Score  AM-PAC Inpatient Mobility Raw Score : 18 (08/08/22 1326)  AM-PAC Inpatient T-Scale Score : 43.63 (08/08/22 1326)  Mobility Inpatient CMS 0-100% Score: 46.58 (08/08/22 1326)  Mobility Inpatient CMS G-Code Modifier : CK (08/08/22 1326)              Goals  Short Term Goals  Time Frame for Short term goals: 14  Short term goal 1: Pt to perform bed mobility and functional transfers independently  Short term goal 2: Pt to ambulate 300ft w/ RW independently  Short term goal 3: Demonstrate standing balance of good - to decrease fall risk  Short term goal 4: Tolerate 30 minutes of therapy to demo increased endurance  Additional Goals?: No  Patient Goals   Patient goals : To go stronger       Education  Patient Education  Education Given To: Patient  Education Provided: Plan of Care;Transfer Training; Fall Prevention Strategies  Education Method: Verbal  Barriers to Learning: None  Education Outcome: Verbalized understanding;Continued education needed      Therapy Time   Individual Concurrent Group Co-treatment   Time In 0833         Time Out 0900         Minutes 27         Timed Code Treatment Minutes: 509 JENN Barnes, PT

## 2022-08-08 NOTE — PROGRESS NOTES
[facility]  Office: [address], DO, Ramírez Simper, DO, Ivonne Toledo, DO, Jean Lopezn Blood, DO, Karine Pineda MD, Sasha Ocampo MD, James Vivas MD, Gabe Acuña MD,  Andrea Ocampo MD, Maryjo Jaimes MD, Jaz William, DO, Caro Villalta MD,  Isac Segal MD, Pamela De Jesus MD, Netta Tyson, DO, Ivy Figueroa MD, Liz Barahona MD, Rachna Mccall MD, Bong Henriquez, DO, Aftab Robles MD, Marylin Caputo MD, Birtha Members, CNP,  Driss Rai, CNP, Anjel Spann, CNP, Tracy Michael, CNP, Rishabh Park PAArsenioC, Edgar Vera, [facility], Carolin Sands, CNP, Ailyn Mccartney, CNP, Rose Bruno, CNP, Octavia Au, CNP, Roney Chang, CNP, Nathalie Fermin, CNS, Andres Lindsay, [facility], Werner Sanz, CNP, Rosa Montes De Oca, CNP, Concha Eli, [facility]           [address]    Progress Note    8/8/2022    2:47 PM    Name:   Miladis Dash  MRN:     [id number]     Acct:      [de-identified]   Room:   [address] [place] [address] Day:  3  Admit Date:  8/2/2022  4:33 PM    PCP:   No primary care provider on file. Code Status:  Full Code    Subjective:     C/C:   Chief Complaint   Patient presents with    Altered Mental Status     Interval History Status: improved    Pt feeling a little better today, memory seems to be improving  She is still weak and gets fatigued/winded with ambulation  Glucose is stable  No fevers, chills, nausea vomiting diarrhea    Brief History:     52-year-old female initially presented to hospital for altered mental status during dialysis. She was found to have UTI, cultures are growing Klebsiella and VRE. She was seen by infectious disease who started her on meropenem and Zyvox. Patient was also seen by nephrology, there was concern for dialysis disequilibrium syndrome so dialysis was held and patient response was monitored.   Otherwise, patient glucose was uncontrolled    Review of Systems:   Pt not a good historian due to confusion but she did answer following     Constitutional:  negative for chills, fevers, sweats  Respiratory:  negative for cough, dyspnea on exertion, shortness of breath, wheezing  Cardiovascular:  negative for chest pain, chest pressure/discomfort, lower extremity edema, palpitations  Gastrointestinal:  negative for abdominal pain, constipation, diarrhea, nausea, vomiting  Neurological:  negative for dizziness, headache says she has issues with memory   : denies  burning with urination     Medications: Allergies: Allergies   Allergen Reactions    Latex Hives    Metformin And Related Diarrhea       Current Meds:   Scheduled Meds:    insulin glargine  55 Units SubCUTAneous BID    linezolid  600 mg Oral 2 times per day    meropenem  1,000 mg IntraVENous Q24H    insulin lispro  10 Units SubCUTAneous TID WC    docusate sodium  100 mg Oral BID    [Held by provider] DULoxetine  30 mg Oral Daily    febuxostat  40 mg Oral Daily    pantoprazole  40 mg Oral Daily    ranolazine  1,000 mg Oral BID    insulin lispro  0-4 Units SubCUTAneous TID WC    insulin lispro  0-4 Units SubCUTAneous Nightly    heparin (porcine)  5,000 Units SubCUTAneous 3 times per day    aspirin  81 mg Oral Daily    atorvastatin  10 mg Oral Daily    [Held by provider] busPIRone  10 mg Oral TID    carvedilol  3.125 mg Oral BID WC    furosemide  80 mg Oral BID    sodium bicarbonate  650 mg Oral TID    tamsulosin  0.4 mg Oral Daily    [Held by provider] traZODone  50 mg Oral Nightly    sodium chloride flush  5-40 mL IntraVENous 2 times per day     Continuous Infusions:    dextrose      sodium chloride       PRN Meds: melatonin, glucose, dextrose bolus **OR** dextrose bolus, glucagon (rDNA), dextrose, sodium chloride flush, sodium chloride, ondansetron **OR** ondansetron, polyethylene glycol, acetaminophen **OR** acetaminophen    Data:     Past Medical History:   has no past medical history on file.     Social History: Family History: No family history on file. Vitals:  BP (!) 110/54   Pulse 79   Temp 97.7 °F (36.5 °C) (Temporal)   Resp 18   Ht 5' 5\" (1.651 m)   Wt 230 lb 13.2 oz (104.7 kg)   SpO2 95%   BMI 38.41 kg/m²   Temp (24hrs), Av.8 °F (36.6 °C), Min:97.7 °F (36.5 °C), Max:97.8 °F (36.6 °C)    Recent Labs     22  1708 22  0824 22  1145   POCGLU 216* 192* 123* 230*         I/O (24Hr): Intake/Output Summary (Last 24 hours) at 2022 1447  Last data filed at 2022 2356  Gross per 24 hour   Intake 148.36 ml   Output --   Net 148.36 ml         Labs:  Hematology:  No results for input(s): WBC, RBC, HGB, HCT, MCV, MCH, MCHC, RDW, PLT, MPV, SEDRATE, CRP, INR, DDIMER, [id number], LABABSO in the last 72 hours. Invalid input(s): PT    Chemistry:  Recent Labs     22  0721      K 3.9      CO2 22   GLUCOSE 123*   BUN 34*   CREATININE 3.41*   ANIONGAP 14   LABGLOM 14*   GFRAA 16*   CALCIUM 9.2       Recent Labs     22  0807 22  1122 22  1708 22  0824 22  1145   POCGLU 233* 153* 216* 192* 123* 230*       ABG:  Lab Results   Component Value Date/Time    FIO2 INFORMATION NOT PROVIDED 2022 07:58 AM     Lab Results   Component Value Date/Time    SPECIAL RIGHT HAND 2ML 2022 02:22 AM     Lab Results   Component Value Date/Time    CULTURE NO GROWTH 5 DAYS 2022 02:22 AM       Radiology:  CT HEAD WO CONTRAST    Result Date: 2022  No evidence for acute intracranial hemorrhage, territorial infarction or intracranial mass lesion. Mild chronic microangiopathic ischemic disease. Mild generalized volume loss. MRI BRAIN WO CONTRAST    Result Date: 8/3/2022  Chronic microvascular disease without acute intracranial abnormality. Chronic sinusitis most pronounced in the right maxillary sinus.        Physical Examination:        General appearance:  alert, cooperative and no distress  Mental Status:  she is oriented to person, knows shes in the hospital but cant remember name. Does not know month. Lungs:  clear to auscultation bilaterally, normal effort  Heart:  regular rate and rhythm, no murmur  Abdomen:  soft, nontender, nondistended, normal bowel sounds, no masses, hepatomegaly, splenomegaly  Extremities:  no edema, redness, tenderness in the calves  Skin:  no gross lesions, rashes, induration    Assessment:        Hospital Problems             Last Modified POA    * (Principal) Dialysis disequilibrium syndrome 8/4/2022 Yes    Vancomycin resistant Enterococcus UTI 8/4/2022 Yes    Acute cystitis without hematuria 8/6/2022 Yes    VRE infection (vancomycin resistant Enterococcus) 8/4/2022 Yes    Infection due to ESBL-producing Klebsiella pneumoniae 8/4/2022 Yes    ESRD (end stage renal disease) ([facility] [address] 8/6/2022 Yes    Encephalopathy 8/3/2022 Yes    Syncope and collapse 8/3/2022 Yes    Class 2 severe obesity due to excess calories with serious comorbidity and body mass index (BMI) of 35.0 to 35.9 in adult [facility]) 8/4/2022 Yes    Type 2 diabetes mellitus with hyperglycemia, with long-term current use of insulin ([facility] [address] 8/4/2022 Yes    Disorientation 1/7/4667 Yes    Metabolic encephalopathy 1/8/3934 Yes   Plan:        Toxic metabolic encephalopathy: 2/2 UTI+/-disequilibrium syndrome. Urine culture growing VRE and ESBL. Started on Zyvox and meropenem for 7 days until 8/10/2022. .  Nephrology following, recommend less frequent/shorter HD sessions and eventual transition to peritoneal dialysis in the future. They will discuss with pts primary nephrologist.   Neurology recommend outpt neuropsych testing.    Peer to peer completed today, plan for rehab placement  Diabetes mellitus type 2 with hyperglycemia: continue  lantus to  55 units BID increase lispro to 12 units TID with correctional insulin monitor for hypoglycemia adjust every couple of days pending on requirments   Anemia of chronic inflammation: monitor, transfuse as needed  Obesity BMI of 37: Recommend weight loss lifestyle modification  Depression: hold Cymbalta trazodone and Buspar while on zyvox  PT OT    Dispo: stable for discharge  Rolanda Christian DO  8/8/2022  2:47 PM

## 2022-08-08 NOTE — CARE COORDINATION
Transitional Planning:  Retrieved vm from [facility] [phone] at [facility]. Requesting P2P by noon today. Phone number is [phone] opt 4. Reference # [id number]. PS attending Dr. Roland Wilkes to inform him that I would contact Dr. Leighton Doherty. Call placed to Dr. Danitza Segovia with above information. Reached her vm. Left above message on recorder. 11:03  Call to Dr. Leighton Doherty. Reached vm.  2nd message not left. PS Dr. Roland Wilkes with P2P information to make call as I have not made contact with Dr. Leighton Doherty. 12:25  Call to PT to have pt seen. 14:23  Informed by Dr. Roland Wilkes that P2P was approved. Informed Wendy at [facility] who asks if we have an auth Number. Per Dr. Roland Wilkes, he did not get an auth #.  [facility] states she cannot bring pt in until she has the Marcelomadelin Stover number. Informed [facility] that the facility is usually notified by the insurance comp. Wendy checks with Robyn Victoria who does precerts and verifies that she needs to wait to hear from insurance. 16:20  Call to [facility] to see if she has auth number yet. She does not. Pt and sister notified that pt will likely go to SNF tomorrow. Informed nurse Haseeb Rudd that we need a COVID. The test has been ordered. [facility]ating [facility] will get [facility] #.  Med nec faxed to [address].

## 2022-08-08 NOTE — PLAN OF CARE
Problem: ABCDS Injury Assessment  Goal: Absence of physical injury  Outcome: Progressing     Problem: Safety - Adult  Goal: Free from fall injury  Outcome: Progressing     Problem: Skin/Tissue Integrity  Goal: Absence of new skin breakdown  Description: 1. Monitor for areas of redness and/or skin breakdown  2. Assess vascular access sites hourly  3. Every 4-6 hours minimum:  Change oxygen saturation probe site  4. Every 4-6 hours:  If on nasal continuous positive airway pressure, respiratory therapy assess nares and determine need for appliance change or resting period.   Outcome: Progressing     Problem: Chronic Conditions and Co-morbidities  Goal: Patient's chronic conditions and co-morbidity symptoms are monitored and maintained or improved  Outcome: Progressing     Problem: Pain  Goal: Verbalizes/displays adequate comfort level or baseline comfort level  Outcome: Progressing

## 2022-08-09 VITALS
TEMPERATURE: 97.8 F | BODY MASS INDEX: 37.5 KG/M2 | SYSTOLIC BLOOD PRESSURE: 117 MMHG | RESPIRATION RATE: 18 BRPM | HEART RATE: 75 BPM | OXYGEN SATURATION: 96 % | HEIGHT: 65 IN | DIASTOLIC BLOOD PRESSURE: 33 MMHG | WEIGHT: 225.09 LBS

## 2022-08-09 PROBLEM — R55 SYNCOPE AND COLLAPSE: Status: RESOLVED | Noted: 2022-08-03 | Resolved: 2022-08-09

## 2022-08-09 PROBLEM — G93.40 ENCEPHALOPATHY: Status: RESOLVED | Noted: 2022-08-03 | Resolved: 2022-08-09

## 2022-08-09 LAB
ANION GAP SERPL CALCULATED.3IONS-SCNC: 11 MMOL/L (ref 9–17)
BUN BLDV-MCNC: 34 MG/DL (ref 8–23)
CALCIUM SERPL-MCNC: 9.4 MG/DL (ref 8.6–10.4)
CHLORIDE BLD-SCNC: 106 MMOL/L (ref 98–107)
CO2: 24 MMOL/L (ref 20–31)
CREAT SERPL-MCNC: 3.21 MG/DL (ref 0.5–0.9)
GFR AFRICAN AMERICAN: 18 ML/MIN
GFR NON-AFRICAN AMERICAN: 15 ML/MIN
GFR SERPL CREATININE-BSD FRML MDRD: ABNORMAL ML/MIN/{1.73_M2}
GLUCOSE BLD-MCNC: 134 MG/DL (ref 70–99)
GLUCOSE BLD-MCNC: 168 MG/DL (ref 65–105)
GLUCOSE BLD-MCNC: 181 MG/DL (ref 65–105)
GLUCOSE BLD-MCNC: 214 MG/DL (ref 65–105)
GLUCOSE BLD-MCNC: 255 MG/DL (ref 65–105)
POTASSIUM SERPL-SCNC: 3.7 MMOL/L (ref 3.7–5.3)
SODIUM BLD-SCNC: 141 MMOL/L (ref 135–144)

## 2022-08-09 PROCEDURE — 2580000003 HC RX 258: Performed by: NURSE PRACTITIONER

## 2022-08-09 PROCEDURE — 99232 SBSQ HOSP IP/OBS MODERATE 35: CPT | Performed by: INTERNAL MEDICINE

## 2022-08-09 PROCEDURE — 82947 ASSAY GLUCOSE BLOOD QUANT: CPT

## 2022-08-09 PROCEDURE — 6360000002 HC RX W HCPCS: Performed by: FAMILY MEDICINE

## 2022-08-09 PROCEDURE — 6370000000 HC RX 637 (ALT 250 FOR IP): Performed by: INTERNAL MEDICINE

## 2022-08-09 PROCEDURE — 99231 SBSQ HOSP IP/OBS SF/LOW 25: CPT | Performed by: NURSE PRACTITIONER

## 2022-08-09 PROCEDURE — 6360000002 HC RX W HCPCS: Performed by: INTERNAL MEDICINE

## 2022-08-09 PROCEDURE — 36415 COLL VENOUS BLD VENIPUNCTURE: CPT

## 2022-08-09 PROCEDURE — 2580000003 HC RX 258: Performed by: INTERNAL MEDICINE

## 2022-08-09 PROCEDURE — 80048 BASIC METABOLIC PNL TOTAL CA: CPT

## 2022-08-09 PROCEDURE — 6370000000 HC RX 637 (ALT 250 FOR IP): Performed by: FAMILY MEDICINE

## 2022-08-09 RX ORDER — INSULIN LISPRO 100 [IU]/ML
12 INJECTION, SOLUTION INTRAVENOUS; SUBCUTANEOUS
DISCHARGE
Start: 2022-08-09 | End: 2022-09-08

## 2022-08-09 RX ORDER — ATORVASTATIN CALCIUM 10 MG/1
10 TABLET, FILM COATED ORAL DAILY
Qty: 30 TABLET | Refills: 0 | DISCHARGE
Start: 2022-08-10 | End: 2022-09-27 | Stop reason: SDUPTHER

## 2022-08-09 RX ADMIN — FUROSEMIDE 80 MG: 40 TABLET ORAL at 10:57

## 2022-08-09 RX ADMIN — RANOLAZINE 1000 MG: 500 TABLET, FILM COATED, EXTENDED RELEASE ORAL at 10:57

## 2022-08-09 RX ADMIN — FEBUXOSTAT 40 MG: 40 TABLET ORAL at 10:57

## 2022-08-09 RX ADMIN — HEPARIN SODIUM 5000 UNITS: 5000 INJECTION INTRAVENOUS; SUBCUTANEOUS at 07:07

## 2022-08-09 RX ADMIN — LINEZOLID 600 MG: 600 TABLET, FILM COATED ORAL at 10:57

## 2022-08-09 RX ADMIN — SODIUM CHLORIDE, PRESERVATIVE FREE 10 ML: 5 INJECTION INTRAVENOUS at 10:58

## 2022-08-09 RX ADMIN — SODIUM BICARBONATE 650 MG: 648 TABLET ORAL at 15:11

## 2022-08-09 RX ADMIN — DOCUSATE SODIUM 100 MG: 100 CAPSULE ORAL at 10:57

## 2022-08-09 RX ADMIN — MEROPENEM 1000 MG: 1 INJECTION, POWDER, FOR SOLUTION INTRAVENOUS at 15:15

## 2022-08-09 RX ADMIN — PANTOPRAZOLE SODIUM 40 MG: 40 TABLET, DELAYED RELEASE ORAL at 10:57

## 2022-08-09 RX ADMIN — INSULIN LISPRO 2 UNITS: 100 INJECTION, SOLUTION INTRAVENOUS; SUBCUTANEOUS at 18:21

## 2022-08-09 RX ADMIN — ATORVASTATIN CALCIUM 10 MG: 10 TABLET, FILM COATED ORAL at 10:57

## 2022-08-09 RX ADMIN — SODIUM BICARBONATE 650 MG: 648 TABLET ORAL at 10:57

## 2022-08-09 RX ADMIN — INSULIN LISPRO 1 UNITS: 100 INJECTION, SOLUTION INTRAVENOUS; SUBCUTANEOUS at 13:20

## 2022-08-09 RX ADMIN — INSULIN LISPRO 12 UNITS: 100 INJECTION, SOLUTION INTRAVENOUS; SUBCUTANEOUS at 18:22

## 2022-08-09 RX ADMIN — CARVEDILOL 3.12 MG: 3.12 TABLET, FILM COATED ORAL at 10:57

## 2022-08-09 RX ADMIN — ASPIRIN 81 MG: 81 TABLET, COATED ORAL at 10:57

## 2022-08-09 RX ADMIN — CARVEDILOL 3.12 MG: 3.12 TABLET, FILM COATED ORAL at 18:21

## 2022-08-09 RX ADMIN — INSULIN LISPRO 12 UNITS: 100 INJECTION, SOLUTION INTRAVENOUS; SUBCUTANEOUS at 13:21

## 2022-08-09 RX ADMIN — HEPARIN SODIUM 5000 UNITS: 5000 INJECTION INTRAVENOUS; SUBCUTANEOUS at 15:11

## 2022-08-09 RX ADMIN — TAMSULOSIN HYDROCHLORIDE 0.4 MG: 0.4 CAPSULE ORAL at 10:57

## 2022-08-09 ASSESSMENT — ENCOUNTER SYMPTOMS
COLOR CHANGE: 0
ABDOMINAL DISTENTION: 0
COUGH: 0
CHEST TIGHTNESS: 0
EYE ITCHING: 0
CONSTIPATION: 0
ABDOMINAL PAIN: 0
DIARRHEA: 0
EYE PAIN: 0
CHOKING: 0
RHINORRHEA: 0
BACK PAIN: 0
SINUS PAIN: 0
SHORTNESS OF BREATH: 0

## 2022-08-09 ASSESSMENT — PAIN SCALES - GENERAL: PAINLEVEL_OUTOF10: 0

## 2022-08-09 NOTE — CARE COORDINATION
Pt and pts sister states they want Tai Dodson 1999. Pt is scheduled for transport to [facility] at [facility] for 2:00 p/u today. Message left for Kd to call cm back regarding bed availabilty. 1:50 Spoke with Kd at [facility] there are no beds available at this time.  Pt and sister informed and saccepting to go to [facility] at [facility] today

## 2022-08-09 NOTE — DISCHARGE INSTR - COC
Continuity of Care Form    Patient Name: Gómez Smith   :  1960  MRN:  [id number]    [facility] date:  2022  Discharge date:  2022    Code Status Order: Full Code   Advance Directives:     Admitting Physician:  No admitting provider for patient encounter. PCP: No primary care provider on file. Discharging Nurse: [facility] Unit/Room#: 2467/1876-88  Discharging Unit Phone Number: [phone]    Emergency Contact:   Extended Emergency Contact Information  Primary Emergency Contact: Hailey Guido  Home Phone: [phone]  Relation: Brother/Sister  Secondary Emergency Contact: [address] Phone: [phone]  Relation: Brother/Sister    Past Surgical History:  No past surgical history on file.     Immunization History:   Immunization History   Administered Date(s) Administered    COVID-19, PFIZER PURPLE top, DILUTE for use, (age 15 y+), 30mcg/0.3mL 2021       Active Problems:  Patient Active Problem List   Diagnosis Code    Vancomycin resistant Enterococcus UTI A49.1, Z16.21    ESRD (end stage renal disease) ([facility] [address] N18.6    Encephalopathy G93.40    Syncope and collapse R55    Dialysis disequilibrium syndrome E87.8    Cystitis N30.90    VRE infection (vancomycin resistant Enterococcus) A49.1, Z16.21    Infection due to ESBL-producing Klebsiella pneumoniae A49.8, Z16.12    Class 2 severe obesity due to excess calories with serious comorbidity and body mass index (BMI) of 35.0 to 35.9 in adult ([facility] E66.01, Z68.35    Type 2 diabetes mellitus with hyperglycemia, with long-term current use of insulin (HCC) E11.65, Z79.4    Disorientation X29.3    Metabolic encephalopathy R65.52       Isolation/Infection:   Isolation            Contact  Contact          Patient Infection Status       Infection Onset Added Last Indicated Last Indicated By Review Planned Expiration Resolved Resolved By    ESBL (Extended Spectrum Beta Lactamase) 22 Culture, Urine        MDRO on home oxygen therapy. Ventilator:    - No ventilator support    Rehab Therapies: Physical Therapy, Occupational Therapy, and Speech/Language Therapy  Weight Bearing Status/Restrictions: No weight bearing restrictions  Other Medical Equipment (for information only, NOT a DME order):  wheelchair, walker, and bedside commode  Other Treatments: n/a      Patient's personal belongings (please select all that are sent with patient):  {[facility] DME Belongings:[id number]}    RN SIGNATURE:  Electronically signed by Tabitha Miller RN on 8/9/22 at 2:53 PM EDT    CASE MANAGEMENT/SOCIAL WORK SECTION    Inpatient Status Date: ***    Readmission Risk Assessment Score:  Readmission Risk              Risk of Unplanned Readmission:  [id number]           Discharging to Facility/ Agency   Name: [facility]  Address:  Phone:  Fax:    Dialysis Facility (if applicable)   Name:  Address:  Dialysis Schedule:  Phone:  Fax:    / signature: Electronically signed by Jordan Agosto RN on 8/9/22 at 2:15 PM EDT    PHYSICIAN SECTION    Prognosis: Good    Condition at Discharge: Stable    Rehab Potential (if transferring to Rehab): Fair    Recommended Labs or Other Treatments After Discharge: BMP Mondays and Friday, please send results to [facility]    Physician Certification: I certify the above information and transfer of Jean Honey  is necessary for the continuing treatment of the diagnosis listed and that she requires Crow Mcclouduel for less 30 days.      Update Admission H&P: Changes in H&P as follows - see discharge summary    PHYSICIAN SIGNATURE:  Electronically signed by Eleni Mays MD on 8/9/22 at 2:32 PM EDT

## 2022-08-09 NOTE — PLAN OF CARE
Problem: ABCDS Injury Assessment  Goal: Absence of physical injury  8/9/2022 1635 by Eren Ivy RN  Outcome: Completed  Flowsheets (Taken 8/9/2022 1618)  Absence of Physical Injury: Implement safety measures based on patient assessment  8/9/2022 0420 by Lisa Gifford RN  Outcome: Progressing     Problem: Safety - Adult  Goal: Free from fall injury  8/9/2022 1635 by Eren Ivy RN  Outcome: Completed  Flowsheets (Taken 8/9/2022 1618)  Free From Fall Injury:   Brandy Molina family/caregiver on patient safety   Based on caregiver fall risk screen, instruct family/caregiver to ask for assistance with transferring infant if caregiver noted to have fall risk factors  8/9/2022 0420 by Lisa Gifford RN  Outcome: Progressing     Problem: Skin/Tissue Integrity  Goal: Absence of new skin breakdown  Description: 1. Monitor for areas of redness and/or skin breakdown  2. Assess vascular access sites hourly  3. Every 4-6 hours minimum:  Change oxygen saturation probe site  4. Every 4-6 hours:  If on nasal continuous positive airway pressure, respiratory therapy assess nares and determine need for appliance change or resting period.   8/9/2022 1635 by Eren Ivy RN  Outcome: Completed  8/9/2022 0420 by Lisa Gifford RN  Outcome: Progressing     Problem: Chronic Conditions and Co-morbidities  Goal: Patient's chronic conditions and co-morbidity symptoms are monitored and maintained or improved  8/9/2022 1635 by Eren Ivy RN  Outcome: Completed  8/9/2022 0420 by Lisa Gifford RN  Outcome: Progressing     Problem: Pain  Goal: Verbalizes/displays adequate comfort level or baseline comfort level  8/9/2022 1635 by Eren Ivy RN  Outcome: Completed  8/9/2022 0420 by Lisa Gifford RN  Outcome: Progressing

## 2022-08-09 NOTE — PROGRESS NOTES
Physical Therapy        Physical Therapy Cancel Note      DATE: 2022    NAME: Turner Perales  MRN: [id number]   : 1960      Patient not seen this date for Physical Therapy due to:    Patient Declined: Pt states that she has already been up today and is leaving today. Pt declining therapy at this time. RN notified.       Electronically signed by Kenneth Miner PT on 2022 at 11:43 AM

## 2022-08-09 NOTE — PROGRESS NOTES
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  Suspected to be Dialysis disequilibrium syndrome  Chronic UTI - VRE and ESBL klebsiella    Other:  ESRD  Type 2 DM  ERICK  Chronic hypoxic respiratory failure  anemia  Discussion / summary of stay / plan of care     Recommendations   zyvox and meropenem for -7 days till 8/10  WBC and plts are stable  midline for discharge -placed - 8/5  Nephrology - pending alternative dialysis option  Carotid Doppler-less than 50% stenosis bilateral ICA    Infection Control Recommendations   Sterling Precautions  Contact Isolation       Antimicrobial Stewardship Recommendations   Simplification of therapy  Targeted therapy    History of Present Illness:   Initial history:  History obtained from chart review  Azeem Stern is a 57y.o.-year-old female with past medical history of ESRD on hemodialysis, type 2 diabetes, ERICK, chronic hypoxic respiratory failure, history of global amnesia occurring up during dialysis and taking 1 month to resolve. Patient was on dialysis on 8/2 and became confused at the end of the dialysis and was brought to the ER. In the ER patient was confused with global amnesia does not even know her name. Had no focal deficit other than global amnesia. Strength was intact in all the extremities with intact sensation. Patient denied headache, chest pain or shortness of breath or abdominal pain. In the ED patient was hemodynamically stable. Creatinine 1.6, CRP 9.8,  Blood sugar 402, WBC 6.8, urine culture positive for VRE sensitive to linezolid. ESR 87. Negative blood culture. MRI brain showed chronic microvascular disease without any acute intracranial abnormality.  EEG is negative for any epileptiform activity. Echo showed ejection fraction of 60%. Patient was admitted to the observation unit and ID was consulted for VRE positive urine culture. Interval changes  8/9/2022   No data found. CURRENT EVALUATION :8/9/2022    Patient's remote memory is the same as of yesterday-patient remembers yesterday's events and has everything written down in the paper. Patient is alert, not in acute distress. Afebrile  VS stable    Patient feels better    Dysuria improved. No complaints at present  Urine culture-8/3 was positive for VRE and Klebsiella - ESBL+  Patient tolerating antimicrobials. Renal monitoring urine output to see if patient will need dialysis and the other dialysis options  BUN and creatinine improving    Summary of relevant labs: 8/9/2022    Labs:  Creatinine-1.6-2.13-3.10-3.2--3.41-3.21  BUN -69-76-15-34-- 34-34    Blood sugar 181   CRP 9.8  ESR 87    Toxic urinary screen was negative for benzodiazepine methadone and cannabinoid  TSH-1.89    WBC 6.8  Hb 9.9  Plat 216    Micro:  Urine culture 8/3-positive for VRE and Klebsiella-ESBL  blood culture 8/3 - negative so far  Imaging:  Carotid Doppler 8/4-less than 50% stenosis of bilateral ICA    MRI brain 8/3-  Impression   Chronic microvascular disease without acute intracranial abnormality. Chronic sinusitis most pronounced in the right maxillary sinus. I have personally reviewed the past medical history, past surgical history, medications, social history, and family history, and I haveupdated the database accordingly. Discussed with patient, RN, family. Allergies:   Latex and Metformin and related     Review of Systems:     Review of Systems   Constitutional:  Negative for activity change, chills and fever. HENT:  Negative for ear pain, rhinorrhea and sinus pain. Eyes:  Negative for pain and itching. Respiratory:  Negative for cough, choking, chest tightness and shortness of breath.     Cardiovascular:  Negative for chest pain and leg swelling. Gastrointestinal:  Negative for abdominal distention, abdominal pain, constipation and diarrhea. Endocrine: Negative. Genitourinary:  Negative for difficulty urinating, dysuria, enuresis, frequency, hematuria and urgency. Musculoskeletal:  Negative for arthralgias, back pain and joint swelling. Skin:  Negative for color change and pallor. Neurological:  Negative for dizziness, syncope, facial asymmetry and headaches. Hematological: Negative. Psychiatric/Behavioral:  Negative for agitation and behavioral problems. Physical Examination :       Physical Exam  Constitutional:       General: She is not in acute distress. Appearance: Normal appearance. She is obese. She is not ill-appearing or toxic-appearing. HENT:      Right Ear: External ear normal.      Left Ear: External ear normal.      Nose: Nose normal.      Mouth/Throat:      Mouth: Mucous membranes are moist.   Eyes:      Pupils: Pupils are equal, round, and reactive to light. Cardiovascular:      Rate and Rhythm: Normal rate. Pulses: Normal pulses. Heart sounds: Normal heart sounds. Pulmonary:      Effort: Pulmonary effort is normal.      Breath sounds: Normal breath sounds. Abdominal:      Palpations: Abdomen is soft. Tenderness: There is no abdominal tenderness. There is no guarding or rebound. Musculoskeletal:         General: No swelling, tenderness or deformity. Cervical back: Normal range of motion. Skin:     Coloration: Skin is not jaundiced or pale. Findings: No bruising or erythema. Neurological:      General: No focal deficit present. Cranial Nerves: No cranial nerve deficit. Sensory: No sensory deficit. Comments: Has global memory loss. Does not know about the place, time or person-loss of remote memory, does remember the events of the past 2 days. Not in distress.  Motor and sensory is normal.    Psychiatric:         Behavior: Behavior normal. Thought Content: Thought content normal.       Past Medical History:   No past medical history on file. Past Surgical  History:   No past surgical history on file.     Medications:      insulin lispro  12 Units SubCUTAneous TID     insulin glargine  55 Units SubCUTAneous BID    linezolid  600 mg Oral 2 times per day    meropenem  1,000 mg IntraVENous Q24H    docusate sodium  100 mg Oral BID    [Held by provider] DULoxetine  30 mg Oral Daily    febuxostat  40 mg Oral Daily    pantoprazole  40 mg Oral Daily    ranolazine  1,000 mg Oral BID    insulin lispro  0-4 Units SubCUTAneous TID     insulin lispro  0-4 Units SubCUTAneous Nightly    heparin (porcine)  5,000 Units SubCUTAneous 3 times per day    aspirin  81 mg Oral Daily    atorvastatin  10 mg Oral Daily    [Held by provider] busPIRone  10 mg Oral TID    carvedilol  3.125 mg Oral BID     furosemide  80 mg Oral BID    sodium bicarbonate  650 mg Oral TID    tamsulosin  0.4 mg Oral Daily    [Held by provider] traZODone  50 mg Oral Nightly    sodium chloride flush  5-40 mL IntraVENous 2 times per day       Social History:     Social History     Socioeconomic History    Marital status: Single     Spouse name: Not on file    Number of children: Not on file    Years of education: Not on file    Highest education level: Not on file   Occupational History    Not on file   Tobacco Use    Smoking status: Not on file    Smokeless tobacco: Not on file   Substance and Sexual Activity    Alcohol use: Not on file    Drug use: Not on file    Sexual activity: Not on file   Other Topics Concern    Not on file   Social History Narrative    Not on file     Social Determinants of Health     Financial Resource Strain: Not on file   Food Insecurity: Not on file   Transportation Needs: Not on file   Physical Activity: Not on file   Stress: Not on file   Social Connections: Not on file   Intimate Partner Violence: Not on file   Housing Stability: Not on file       Family History:   No family history on file. Medical Decision Making:   I have independently reviewed/ordered the following labs:    CBC with Differential:   No results for input(s): WBC, HGB, HCT, PLT, SEGSPCT, BANDSPCT, LYMPHOPCT, MONOPCT, EOSPCT in the last 72 hours. BMP:  Recent Labs     08/08/22  0721 08/09/22  0648    141   K 3.9 3.7    106   CO2 22 24   BUN 34* 34*   CREATININE 3.41* 3.21*       Hepatic Function Panel:   No results for input(s): PROT, LABALBU, BILIDIR, IBILI, BILITOT, ALKPHOS, ALT, AST in the last 72 hours. No results for input(s): RPR in the last 72 hours. No results for input(s): HIV in the last 72 hours. No results for input(s): BC in the last 72 hours. Lab Results   Component Value Date/Time    CREATININE 3.21 08/09/2022 06:48 AM    GLUCOSE 134 08/09/2022 06:48 AM       Detailed results: Thank you for allowing us to participate in the care of this patient. Please call with questions. This note is created with the assistance of a speech recognition program.  While intending to generate adocument that actually reflects the content of the visit, the document can still have some errors including those of syntax and sound a like substitutions which may escape proof reading. It such instances, actual meaningcan be extrapolated by contextual diversion. Claudette Ke, MD  Office: [phone]  Perfect serve / office [phone]      I have discussed the care of the patient, including pertinent history and exam findings,  with the resident. I have seen and examined the patient and the key elements of all parts of the encounter have been performed by me. I agree with the assessment, plan and orders as documented by the resident.     Cindy Caruso, Infectious Diseases

## 2022-08-09 NOTE — PROGRESS NOTES
Renal Progress Note    Patient :  Ever Close; 57 y.o. MRN# [id number]  Location:  [facility]/4584-95  Attending:  Severiano Border, MD  Admit Date:  8/2/2022   Hospital Day: 4      Subjective:     Seems to be in good spirits today. Denies any complaints. Urine output continues to be fair not being measured accurately. Continues on Lasix 80 twice a day. Hemodynamically stable. Oral intake good. Plan for discharge today to [facility]. Antibiotics been switched to oral Zyvox. Meropenem to be finished before discharge. Still has a midline in place. Has not had dialysis in about a week now last treatment 8-2-22. GFR continues to be in the 14 to 15 mm/min range creatinine 3.2-3.4 range. Extensive discussion with her sister following Dr. Raine Magana discussion with them. Different options discussed with them. Patient and family are very apprehensive about continuing dialysis given multiple episodes of amnesia which happened after each treatment. They would prefer to hold dialysis if possible and assess clearances as outpatient before final decision is made. Home therapies have been discussed as well although given her ECF placement may not be an option. Labs 8/9/2022: Sodium 141 potassium 3.7 creatinine 3.2 glucose 134 bicarbonate 24    Brief history: 73-year-old female past medical history of ESRD on hemodialysis, type 2 diabetes, ERICK, chronic hypoxic respiratory failure, history of global amnesia occurring up to 10 times during dialysis which is taken up to a month to resolve. Nephrology consulted for global amnesia during dialysis and ESRD on HD. On TTS at the [facility] unit, under Dr. Jordy Torres      Outpatient Medications:     Medications Prior to Admission: gabapentin (NEURONTIN) 300 MG capsule, Take 300 mg by mouth in the morning. carvedilol (COREG) 3.125 MG tablet, Take 3.125 mg by mouth in the morning and 3.125 mg in the evening. Take with meals.   melatonin 3 MG TABS tablet, Take 10 mg by mouth nightly  furosemide (LASIX) 80 MG tablet, Take 80 mg by mouth in the morning and 80 mg before bedtime. sodium bicarbonate 650 MG tablet, Take 650 mg by mouth in the morning and 650 mg at noon and 650 mg before bedtime. docusate sodium (COLACE) 100 MG capsule, Take 100 mg by mouth in the morning and 100 mg before bedtime. tamsulosin (FLOMAX) 0.4 MG capsule, Take 0.4 mg by mouth in the morning. aspirin 81 MG chewable tablet, Take 81 mg by mouth in the morning. ranolazine (RANEXA) 500 MG extended release tablet, Take 1,000 mg by mouth in the morning and 1,000 mg before bedtime. pantoprazole (PROTONIX) 40 MG tablet, Take 40 mg by mouth in the morning. acetaminophen (TYLENOL) 325 MG tablet, Take 650 mg by mouth every 6 hours as needed for Pain  insulin glargine (LANTUS) 100 UNIT/ML injection vial, Inject 50 Units into the skin 2 times daily  febuxostat (ULORIC) 40 MG TABS tablet, Take 40 mg by mouth in the morning. loperamide (IMODIUM) 2 MG capsule, Take 2 mg by mouth 2 times daily as needed for Diarrhea  [DISCONTINUED] atorvastatin (LIPITOR) 80 MG tablet, Take 80 mg by mouth in the morning. [DISCONTINUED] DULoxetine (CYMBALTA) 30 MG extended release capsule, Take 30 mg by mouth in the morning. [DISCONTINUED] busPIRone (BUSPAR) 10 MG tablet, Take 15 mg by mouth in the morning and 15 mg in the evening. [DISCONTINUED] mirtazapine (REMERON) 15 MG tablet, Take by mouth nightly  [DISCONTINUED] tiZANidine (ZANAFLEX) 4 MG tablet, Take by mouth every 6 hours as needed  [DISCONTINUED] miconazole (MICOTIN) 2 % powder, Apply topically 2 times daily Apply topically 2 times daily. [DISCONTINUED] traZODone (DESYREL) 50 MG tablet, Take 50 mg by mouth nightly  [DISCONTINUED] febuxostat (ULORIC) 40 MG TABS tablet, Take 40 mg by mouth in the morning.   [DISCONTINUED] insulin lispro (HUMALOG) 100 UNIT/ML injection vial, Inject into the skin 3 times daily (before meals) Unknown dose scale  [DISCONTINUED] Baclofen (LIORESAL) 5 MG tablet, Take 2.5 mg by mouth 2 times daily as needed  [DISCONTINUED] DULoxetine (CYMBALTA) 30 MG extended release capsule, Take 30 mg by mouth in the morning. [DISCONTINUED] lidocaine-prilocaine (EMLA) 2.5-2.5 % cream, Apply topically as needed for Pain Apply topically as needed on dialysis days, Tues, Th, Sat    Current Medications:     Scheduled Meds:    insulin lispro  12 Units SubCUTAneous TID WC    insulin glargine  55 Units SubCUTAneous BID    linezolid  600 mg Oral 2 times per day    meropenem  1,000 mg IntraVENous Q24H    docusate sodium  100 mg Oral BID    [Held by provider] DULoxetine  30 mg Oral Daily    febuxostat  40 mg Oral Daily    pantoprazole  40 mg Oral Daily    ranolazine  1,000 mg Oral BID    insulin lispro  0-4 Units SubCUTAneous TID WC    insulin lispro  0-4 Units SubCUTAneous Nightly    heparin (porcine)  5,000 Units SubCUTAneous 3 times per day    aspirin  81 mg Oral Daily    atorvastatin  10 mg Oral Daily    [Held by provider] busPIRone  10 mg Oral TID    carvedilol  3.125 mg Oral BID WC    furosemide  80 mg Oral BID    sodium bicarbonate  650 mg Oral TID    tamsulosin  0.4 mg Oral Daily    [Held by provider] traZODone  50 mg Oral Nightly    sodium chloride flush  5-40 mL IntraVENous 2 times per day     Continuous Infusions:    dextrose      sodium chloride       PRN Meds:  melatonin, glucose, dextrose bolus **OR** dextrose bolus, glucagon (rDNA), dextrose, sodium chloride flush, sodium chloride, ondansetron **OR** ondansetron, polyethylene glycol, acetaminophen **OR** acetaminophen    Input/Output:       I/O last 3 completed shifts:   In: 548.4 [P.O.:300; IV Piggyback:248.4]  Out: - .    Patient Vitals for the past 96 hrs (Last 3 readings):   Weight   22 2325 225 lb 1.4 oz (102.1 kg)   22 0252 230 lb 13.2 oz (104.7 kg)   22 2248 228 lb 13.4 oz (103.8 kg)       Vital Signs:   Temperature:  Temp: 97.8 °F (36.6 °C)  TMax:   Temp (24hrs), Av.7 °F (36.5 °C), Min:97.5 °F (36.4 °C), Max:97.9 °F (36.6 °C)    Respirations:  Resp: 18  Pulse:   Heart Rate: 75  BP:    BP: (!) 117/33  BP Range: Systolic (77LMI), EIN:145 , Min:110 , FMM:510       Diastolic (83TVZ), YMD:64, Min:33, Max:63      Physical Examination:     General:  Awake, in good spirits,, speaking in full sentences, no accessory muscle use. HEENT: Atraumatic, normocephalic, no throat congestion, moist mucosa. Eyes:   Pupils equal, round and reactive to light, EOMI. Neck:   No JVD, no thyromegaly, no lymphadenopathy. Chest:  Bilateral vesicular breath sounds, no rales or wheezes. Cardiac:  S1 S2 RR, no murmurs, gallops or rubs, JVP not raised. Abdomen: Soft, non-tender, no masses or organomegaly, BS audible. :   No suprapubic or flank tenderness. Neuro:  AAO x 3, No FND. SKIN:  No rashes, good skin turgor. Extremities:  1+ edema, sluggish peripheral pulses, no calf tenderness. Labs:     No results for input(s): WBC, RBC, HGB, HCT, MCV, MCH, MCHC, RDW, PLT, MPV in the last 72 hours. BMP:   Recent Labs     08/08/22  0721 08/09/22  0648    141   K 3.9 3.7    106   CO2 22 24   BUN 34* 34*   CREATININE 3.41* 3.21*   GLUCOSE 123* 134*   CALCIUM 9.2 9.4      Phosphorus:   No results for input(s): PHOS in the last 72 hours. Magnesium:  No results for input(s): MG in the last 72 hours. Albumin:  No results for input(s): LABALBU in the last 72 hours.   BNP:    No results found for: BNP  RUSSELL:      Lab Results   Component Value Date/Time    RUSSELL NEGATIVE 08/03/2022 04:30 PM     SPEP:  Lab Results   Component Value Date/Time    PROT 7.4 08/02/2022 05:13 PM     UPEP:   No results found for: LABPE  C3:   No results found for: C3  C4:   No results found for: C4  MPO ANCA:   No results found for: MPO  PR3 ANCA:   No results found for: PR3  Anti-GBM:   No results found for: GBMABIGG  Hep BsAg:       No results found for: HEPBSAG  Hep C AB:        No results found for: HEPCAB    Urinalysis/Chemistries:      Lab Results   Component Value Date/Time    NITRU NEGATIVE 08/02/2022 09:46 PM    COLORU Dark Yellow 08/02/2022 09:46 PM    PHUR 6.5 08/02/2022 09:46 PM    WBCUA TOO NUMEROUS TO COUNT 08/02/2022 09:46 PM    RBCUA 2 TO 5 08/02/2022 09:46 PM    BACTERIA MANY 08/02/2022 09:46 PM    SPECGRAV 1.016 08/02/2022 09:46 PM    LEUKOCYTESUR LARGE 08/02/2022 09:46 PM    UROBILINOGEN Normal 08/02/2022 09:46 PM    BILIRUBINUR NEGATIVE 08/02/2022 09:46 PM    GLUCOSEU NEGATIVE 08/02/2022 09:46 PM    KETUA NEGATIVE 08/02/2022 09:46 PM     Urine Sodium:   No results found for: JASON  Urine Potassium:  No results found for: KUR  Urine Chloride:  No results found for: CLUR  Urine Osmolarity: No results found for: OSMOU  Urine Protein:   No components found for: TOTALPROTEIN, URINE   Urine Creatinine:   No results found for: LABCREA  Urine Eosinophils:  No components found for: UEOS    Radiology:     CXR:     Assessment:     1. ESRD on hemodialysis Tuesday Thursday Saturday at the [facility] unit under Dr. Richard Vázquez via tunnel catheter. It appears she has significant residual renal function and dialysis has been put on hold temporarily. Last treatment Tuesday, 8/2/2022:  2. Multiple episodes of amnesia related to dialysis suspected disequilibrium versus other causes. Since dialysis been held the symptoms seem to be much better. 3.  Suspected dementia  4. Type 2 diabetes with nephropathy  5. VRE and ESBL positive UTI treated with meropenem now on Zyvox  6. Tunnel catheter still in place    Plan:   1. After extensive discussion by me and Dr. Amina Thompson with the family it been agreed upon that we hold dialysis for now. We will follow clearances very closely. BMP will be checked twice a week. She will be seen in the office by Dr. Amina Thompson and decision will be made either to discontinue dialysis completely or to switch modality or switch treatment schedule to avoid above complications  2. Leave tunnel dialysis catheter in for now  3.   Continue

## 2022-08-09 NOTE — CARE COORDINATION
BONNIE following for HD needs. Received call from JENNY wanting to confirm if plan was to continue HD at discharge. Treatments had been on-hold during admission. BONNIE spoke with Dr. Steven Rincon who reviewed options with family. Plan will be to hold HD for now with BMP to be completed on Mondays and Fridays. Results fax to [facility]. Patient plans to follow Dr. Bob Etienne at discharge. Catheter will remain for now. CM Azul Almanza notified of information.

## 2022-08-09 NOTE — PROGRESS NOTES
[facility]  Office: [address], DO, Tay Osorioy, DO, Gordy [facility], DO, Petr Carboen Blood, DO, Karen Zaldivar MD, Arturo Marie MD, Dimitrios Gonzales MD, Deborah Moreno MD,  Hamilton Escobar MD, Pretty Rojo MD, Moreno Tejada, DO, Martha Montes MD,  Sergey Chester MD, Kenyon Palmer MD, Nakul Charles, DO, Pratik Smith MD, Marely Dempsey MD, Warren Gutierrez MD, Molina Garay, DO, Russ Potts MD, Janet Romero MD, Cesario Easley, CNP,  Olivia Camarillo, CNP, Allen Chin, CNP, Sara Rockwell, CNP, Fiona Walton PA-C, Denver Parks, [facility], Lillie Woodruff, CNP, Taj Monreal, CNP, Earnestine Fields, CNP, Ivonne Feliz, CNP, Altagracia Kaur, CNP, Milton Fallon, CNS, Juan R Abdi, [facility], Tremayne Mullen, CNP, Oli Henry, CNP, Efrain Garrett, CNP           [address]    Progress Note    8/9/2022    9:03 AM    Name:   Jocelyn Brewer  MRN:     [id number]     Acct:      [de-identified]   Room:   [address] [place] [address] Day:  4  Admit Date:  8/2/2022  4:33 PM    PCP:   No primary care provider on file. Code Status:  Full Code    Subjective:     C/C:   Chief Complaint   Patient presents with    Altered Mental Status     Interval History Status: improved. Patient seen and evaluated in room sitting in chair no acute distress  Vital signs stable overnight  There continues to be questions about plan for hemodialysis post discharge, clarifying with Nephro    Brief History:     77-year-old female initially presented to hospital for altered mental status during dialysis. She was found to have UTI, cultures are growing Klebsiella and VRE. She was seen by infectious disease who started her on meropenem and Zyvox. Patient was also seen by nephrology, there was concern for dialysis disequilibrium syndrome so dialysis was held and patient response was monitored.      Review of Systems:     Constitutional:  negative for chills, fevers, sweats  Respiratory:  negative for cough, dyspnea on exertion, shortness of breath, wheezing  Cardiovascular:  negative for chest pain, chest pressure/discomfort, lower extremity edema, palpitations  Gastrointestinal:  negative for abdominal pain, constipation, diarrhea, nausea, vomiting  Neurological:  negative for dizziness, headache, endorses continued issues with memory    Medications: Allergies: Allergies   Allergen Reactions    Latex Hives    Metformin And Related Diarrhea       Current Meds:   Scheduled Meds:    insulin lispro  12 Units SubCUTAneous TID WC    insulin glargine  55 Units SubCUTAneous BID    linezolid  600 mg Oral 2 times per day    meropenem  1,000 mg IntraVENous Q24H    docusate sodium  100 mg Oral BID    [Held by provider] DULoxetine  30 mg Oral Daily    febuxostat  40 mg Oral Daily    pantoprazole  40 mg Oral Daily    ranolazine  1,000 mg Oral BID    insulin lispro  0-4 Units SubCUTAneous TID WC    insulin lispro  0-4 Units SubCUTAneous Nightly    heparin (porcine)  5,000 Units SubCUTAneous 3 times per day    aspirin  81 mg Oral Daily    atorvastatin  10 mg Oral Daily    [Held by provider] busPIRone  10 mg Oral TID    carvedilol  3.125 mg Oral BID WC    furosemide  80 mg Oral BID    sodium bicarbonate  650 mg Oral TID    tamsulosin  0.4 mg Oral Daily    [Held by provider] traZODone  50 mg Oral Nightly    sodium chloride flush  5-40 mL IntraVENous 2 times per day     Continuous Infusions:    dextrose      sodium chloride       PRN Meds: melatonin, glucose, dextrose bolus **OR** dextrose bolus, glucagon (rDNA), dextrose, sodium chloride flush, sodium chloride, ondansetron **OR** ondansetron, polyethylene glycol, acetaminophen **OR** acetaminophen    Data:     Past Medical History:   has no past medical history on file. Social History:        Family History: No family history on file.     Vitals:  BP (!) 147/58   Pulse 73   Temp 97.5 °F (36.4 °C) (Oral)   Resp 16   Ht 5' 5\" (1.651 m)   Wt 225 lb 1.4 oz (102.1 kg)   SpO2 99%   BMI 37.46 kg/m²   Temp (24hrs), Av.7 °F (36.5 °C), Min:97.5 °F (36.4 °C), Max:97.9 °F (36.6 °C)    Recent Labs     22  0336   POCGLU 60* 77 184* 168*       I/O (24Hr): Intake/Output Summary (Last 24 hours) at 2022 0903  Last data filed at 2022 0421  Gross per 24 hour   Intake 400 ml   Output --   Net 400 ml       Labs:    Chemistry:  Recent Labs     22  0648    141   K 3.9 3.7    106   CO2 22 24   GLUCOSE 123* 134*   BUN 34* 34*   CREATININE 3.41* 3.21*   ANIONGAP 14 11   LABGLOM 14* 15*   GFRAA 16* 18*   CALCIUM 9.2 9.4     Recent Labs     22  0336   POCGLU 49* 53* 60* 77 184* 168*     ABG:  Lab Results   Component Value Date/Time    FIO2 INFORMATION NOT PROVIDED 2022 07:58 AM     Lab Results   Component Value Date/Time    SPECIAL RIGHT HAND 2ML 2022 02:22 AM     Lab Results   Component Value Date/Time    CULTURE NO GROWTH 5 DAYS 2022 02:22 AM       Radiology:  CT HEAD WO CONTRAST    Result Date: 2022  No evidence for acute intracranial hemorrhage, territorial infarction or intracranial mass lesion. Mild chronic microangiopathic ischemic disease. Mild generalized volume loss. MRI BRAIN WO CONTRAST    Result Date: 8/3/2022  Chronic microvascular disease without acute intracranial abnormality. Chronic sinusitis most pronounced in the right maxillary sinus.        Physical Examination:        General appearance:  alert, cooperative and no distress  Mental Status:  oriented to person, place and time and normal affect  Lungs:  clear to auscultation bilaterally, normal effort  Heart:  regular rate and rhythm, no murmur  Abdomen:  soft, nontender, nondistended, normal bowel sounds, no masses, hepatomegaly, splenomegaly  Extremities:  no edema, redness, tenderness in the calves  Skin:  no gross lesions, rashes, induration    Assessment:        Hospital Problems             Last Modified POA    * (Principal) Dialysis disequilibrium syndrome 8/4/2022 Yes    Vancomycin resistant Enterococcus UTI 8/4/2022 Yes    ESRD (end stage renal disease) ([facility] [address] 8/6/2022 Yes    Encephalopathy 8/3/2022 Yes    Syncope and collapse 8/3/2022 Yes    Cystitis 8/8/2022 Yes    VRE infection (vancomycin resistant Enterococcus) 8/4/2022 Yes    Infection due to ESBL-producing Klebsiella pneumoniae 8/4/2022 Yes    Class 2 severe obesity due to excess calories with serious comorbidity and body mass index (BMI) of 35.0 to 35.9 in adult [facility]) 8/4/2022 Yes    Type 2 diabetes mellitus with hyperglycemia, with long-term current use of insulin ([facility] [address] 8/4/2022 Yes    Disorientation 4/0/9305 Yes    Metabolic encephalopathy 0/5/1091 Yes       Plan:        Klebsiella/VRE UTI: Continues on meropenem and Zyvox until 8/10/2022  AMS/TME: Secondary to #1, complicated by stable disease equilibrium syndrome  End-stage renal disease on hemodialysis: Apparent plan to hold dialysis for the upcoming week and check blood work to determine further hemodialysis plan as outpatient  DMT2: Blood sugar stable, continue Lantus 55 units, MI-ISS.   Monitor patient for hyper/hypoglycemic event  Morbid obesity: BMI 37.4 on admission  Depression: Medications on hold while on Zyvox to prevent prolongation of QTC  GI/DVT prophylaxis  PT, OT  Stable for discharge, CM trying to figure out if patient is to go to or skilled nursing facility    AFSANEH Ruano NP  8/9/2022  9:03 AM

## 2022-08-10 NOTE — PROGRESS NOTES
[facility] here to transport pt to [facility]. Belongings and clothing sent with pt. Report called to facility and questions answered.

## 2022-08-11 ENCOUNTER — HOSPITAL ENCOUNTER (OUTPATIENT)
Age: 62
Setting detail: SPECIMEN
Discharge: HOME | End: 2022-08-11

## 2022-08-11 LAB
ANION GAP SERPL CALCULATED.3IONS-SCNC: 14 MMOL/L (ref 9–17)
BUN BLDV-MCNC: 39 MG/DL (ref 8–23)
BUN/CREAT BLD: 12 (ref 9–20)
CALCIUM SERPL-MCNC: 8.9 MG/DL (ref 8.6–10.4)
CHLORIDE BLD-SCNC: 102 MMOL/L (ref 98–107)
CO2: 22 MMOL/L (ref 20–31)
CREAT SERPL-MCNC: 3.17 MG/DL (ref 0.5–0.9)
GFR AFRICAN AMERICAN: 18 ML/MIN
GFR NON-AFRICAN AMERICAN: 15 ML/MIN
GFR SERPL CREATININE-BSD FRML MDRD: ABNORMAL ML/MIN/{1.73_M2}
GLUCOSE BLD-MCNC: 541 MG/DL (ref 70–99)
HCT VFR BLD CALC: 32.4 % (ref 36.3–47.1)
HEMOGLOBIN: 9.9 G/DL (ref 11.9–15.1)
MCH RBC QN AUTO: 31.3 PG (ref 25.2–33.5)
MCHC RBC AUTO-ENTMCNC: 30.6 G/DL (ref 28.4–34.8)
MCV RBC AUTO: 102.5 FL (ref 82.6–102.9)
NRBC AUTOMATED: 0 PER 100 WBC
PDW BLD-RTO: 15.7 % (ref 11.8–14.4)
PLATELET # BLD: 212 K/UL (ref 138–453)
PMV BLD AUTO: 9.8 FL (ref 8.1–13.5)
POTASSIUM SERPL-SCNC: 4.4 MMOL/L (ref 3.7–5.3)
RBC # BLD: 3.16 M/UL (ref 3.95–5.11)
SODIUM BLD-SCNC: 138 MMOL/L (ref 135–144)
WBC # BLD: 4.1 K/UL (ref 3.5–11.3)

## 2022-08-11 PROCEDURE — 36415 COLL VENOUS BLD VENIPUNCTURE: CPT

## 2022-08-11 PROCEDURE — P9603 ONE-WAY ALLOW PRORATED MILES: HCPCS

## 2022-08-11 PROCEDURE — 80048 BASIC METABOLIC PNL TOTAL CA: CPT

## 2022-08-11 PROCEDURE — 85027 COMPLETE CBC AUTOMATED: CPT

## 2022-08-16 ENCOUNTER — HOSPITAL ENCOUNTER (OUTPATIENT)
Age: 62
Setting detail: SPECIMEN
Discharge: HOME | End: 2022-08-16

## 2022-08-16 LAB
ANION GAP SERPL CALCULATED.3IONS-SCNC: 14 MMOL/L (ref 9–17)
BUN BLDV-MCNC: 43 MG/DL (ref 8–23)
BUN/CREAT BLD: 14 (ref 9–20)
CALCIUM SERPL-MCNC: 9.2 MG/DL (ref 8.6–10.4)
CHLORIDE BLD-SCNC: 105 MMOL/L (ref 98–107)
CO2: 21 MMOL/L (ref 20–31)
CREAT SERPL-MCNC: 3.06 MG/DL (ref 0.5–0.9)
GFR AFRICAN AMERICAN: 19 ML/MIN
GFR NON-AFRICAN AMERICAN: 15 ML/MIN
GFR SERPL CREATININE-BSD FRML MDRD: ABNORMAL ML/MIN/{1.73_M2}
GLUCOSE BLD-MCNC: 194 MG/DL (ref 70–99)
HCT VFR BLD CALC: 29.1 % (ref 36.3–47.1)
HEMOGLOBIN: 9.3 G/DL (ref 11.9–15.1)
MCH RBC QN AUTO: 31.7 PG (ref 25.2–33.5)
MCHC RBC AUTO-ENTMCNC: 32 G/DL (ref 28.4–34.8)
MCV RBC AUTO: 99.3 FL (ref 82.6–102.9)
NRBC AUTOMATED: 0 PER 100 WBC
PDW BLD-RTO: 15.8 % (ref 11.8–14.4)
PLATELET # BLD: 162 K/UL (ref 138–453)
PMV BLD AUTO: 10.9 FL (ref 8.1–13.5)
POTASSIUM SERPL-SCNC: 4.9 MMOL/L (ref 3.7–5.3)
RBC # BLD: 2.93 M/UL (ref 3.95–5.11)
SODIUM BLD-SCNC: 140 MMOL/L (ref 135–144)
WBC # BLD: 4.8 K/UL (ref 3.5–11.3)

## 2022-08-16 PROCEDURE — 36415 COLL VENOUS BLD VENIPUNCTURE: CPT

## 2022-08-16 PROCEDURE — 85027 COMPLETE CBC AUTOMATED: CPT

## 2022-08-16 PROCEDURE — P9603 ONE-WAY ALLOW PRORATED MILES: HCPCS

## 2022-08-16 PROCEDURE — 80048 BASIC METABOLIC PNL TOTAL CA: CPT

## 2022-08-18 ENCOUNTER — HOSPITAL ENCOUNTER (OUTPATIENT)
Age: 62
Setting detail: SPECIMEN
Discharge: HOME | End: 2022-08-18

## 2022-08-18 LAB
ANION GAP SERPL CALCULATED.3IONS-SCNC: 11 MMOL/L (ref 9–17)
BUN BLDV-MCNC: 46 MG/DL (ref 8–23)
BUN/CREAT BLD: 15 (ref 9–20)
CALCIUM SERPL-MCNC: 9 MG/DL (ref 8.6–10.4)
CHLORIDE BLD-SCNC: 108 MMOL/L (ref 98–107)
CO2: 25 MMOL/L (ref 20–31)
CREAT SERPL-MCNC: 3.17 MG/DL (ref 0.5–0.9)
GFR AFRICAN AMERICAN: 18 ML/MIN
GFR NON-AFRICAN AMERICAN: 15 ML/MIN
GFR SERPL CREATININE-BSD FRML MDRD: ABNORMAL ML/MIN/{1.73_M2}
GLUCOSE BLD-MCNC: 95 MG/DL (ref 70–99)
HCT VFR BLD CALC: 27.5 % (ref 36.3–47.1)
HEMOGLOBIN: 8.7 G/DL (ref 11.9–15.1)
MCH RBC QN AUTO: 31.9 PG (ref 25.2–33.5)
MCHC RBC AUTO-ENTMCNC: 31.6 G/DL (ref 28.4–34.8)
MCV RBC AUTO: 100.7 FL (ref 82.6–102.9)
NRBC AUTOMATED: 0 PER 100 WBC
PDW BLD-RTO: 16 % (ref 11.8–14.4)
PLATELET # BLD: 145 K/UL (ref 138–453)
PMV BLD AUTO: 10.4 FL (ref 8.1–13.5)
POTASSIUM SERPL-SCNC: 4.4 MMOL/L (ref 3.7–5.3)
RBC # BLD: 2.73 M/UL (ref 3.95–5.11)
SODIUM BLD-SCNC: 144 MMOL/L (ref 135–144)
WBC # BLD: 4.6 K/UL (ref 3.5–11.3)

## 2022-08-18 PROCEDURE — 85027 COMPLETE CBC AUTOMATED: CPT

## 2022-08-18 PROCEDURE — 80048 BASIC METABOLIC PNL TOTAL CA: CPT

## 2022-08-18 PROCEDURE — 36415 COLL VENOUS BLD VENIPUNCTURE: CPT

## 2022-08-18 PROCEDURE — P9603 ONE-WAY ALLOW PRORATED MILES: HCPCS

## 2022-08-23 ENCOUNTER — HOSPITAL ENCOUNTER (OUTPATIENT)
Age: 62
Setting detail: SPECIMEN
Discharge: HOME | End: 2022-08-23
Payer: MEDICARE

## 2022-08-23 LAB
ANION GAP SERPL CALCULATED.3IONS-SCNC: 12 MMOL/L (ref 9–17)
BUN BLDV-MCNC: 42 MG/DL (ref 8–23)
CALCIUM SERPL-MCNC: 9.4 MG/DL (ref 8.6–10.4)
CHLORIDE BLD-SCNC: 105 MMOL/L (ref 98–107)
CO2: 23 MMOL/L (ref 20–31)
CREAT SERPL-MCNC: 2.96 MG/DL (ref 0.5–0.9)
GFR AFRICAN AMERICAN: 19 ML/MIN
GFR NON-AFRICAN AMERICAN: 16 ML/MIN
GFR SERPL CREATININE-BSD FRML MDRD: ABNORMAL ML/MIN/{1.73_M2}
GLUCOSE BLD-MCNC: 61 MG/DL (ref 70–99)
POTASSIUM SERPL-SCNC: 5.2 MMOL/L (ref 3.7–5.3)
SODIUM BLD-SCNC: 140 MMOL/L (ref 135–144)

## 2022-08-23 PROCEDURE — 80048 BASIC METABOLIC PNL TOTAL CA: CPT

## 2022-08-23 NOTE — DISCHARGE SUMMARY
[facility]  Office: [address], DO, Lucien Tillman, DO, Mercy Yun, DO, Evette Tai Blood, DO, Karla Gunn MD, Shyam Coe MD, Anibal Ortiz MD, Claudean Millard, MD,  Deb Acuña MD, La Torres MD, Brooke Chappell, DO, Stan Cortez MD,  Di Ye MD, Elder Coyne MD, Verla Kawasaki, DO, Don Meraz MD, Qiana Dietrich MD, Barbara Soto MD, Zia Stoddard, DO, Luciano Crook MD, Unruly Tolentino MD, Yamilet Mensah, CNP,  Letha Avelar, CNP, Tamia Sandoval CNP, Johanna Asher CNP, Christy Colon PA-C, Dana Manzanares, [facility], Lilliana Shepherd, CNP, Ruperto Jones, CNP, Bria Seaman, CNP, Tim Saint, CNP, Gerry Burroughs, CNP, Gerber Montiel, CNS, Fred Smyth, [facility], Tommy Bell, CNP, Bernice Law, CNP, Bre Landers, [facility]    Discharge Summary     Patient ID: Vanessa Huang  :  1960   MRN: [id number]     ACCOUNT:  [de-identified]   Patient's PCP: AFSANEH Perez CNP  Admit Date: 2022   Discharge Date: 22  Length of Stay: 4  Code Status:  Prior  Admitting Physician: Ava Bradford DO  Discharge Physician: AFSANEH Bush NP     Active Discharge Diagnoses:     Hospital Problem Lists:  Principal Problem:    Dialysis disequilibrium syndrome  Active Problems:    Vancomycin resistant Enterococcus UTI    [facility]) [facility]    Cystitis    VRE infection (vancomycin resistant Enterococcus)    Infection due to ESBL-producing Klebsiella pneumoniae    Class 2 severe obesity due to excess calories with serious comorbidity and body mass index (BMI) of 35.0 to 35.9 in [facility])    Type 2 diabetes mellitus with hyperglycemia, with long-term current use of insulin ([facility]    Disorientation    Metabolic encephalopathy  Resolved Problems:    Encephalopathy    Syncope and collapse      Admission Condition:  fair       Discharged Condition: stable    Hospital Stay:     Hospital Course:  Gilford Rather is a 57 y.o. female who was admitted for the management of   Dialysis disequilibrium syndrome , presented to ER with Altered Mental Status    56-year-old female initially presented to hospital for altered mental status during dialysis. She was found to have UTI, cultures are growing Klebsiella and VRE. She was seen by infectious disease who started her on meropenem and Zyvox. Patient was also seen by nephrology, there was concern for dialysis disequilibrium syndrome so dialysis was held and patient response was monitored. Significant therapeutic interventions:     Klebsiella/VRE UTI: Continues on meropenem and Zyvox until 8/10/2022  AMS/TME: Secondary to #1, complicated by stable disease equilibrium syndrome  End-stage renal disease on hemodialysis: Apparent plan to hold dialysis for the upcoming week and check blood work to determine further hemodialysis plan as outpatient  DMT2: Blood sugar stable, continue Lantus 55 units, MI-ISS.   Monitor patient for hyper/hypoglycemic event  Morbid obesity: BMI 37.4 on admission  Depression: Medications on hold while on Zyvox to prevent prolongation of QTC  GI/DVT prophylaxis  PT, OT  Stable for discharge, CM trying to figure out if patient is to go to or skilled nursing facility    Significant Diagnostic Studies:   Labs / Micro:  CBC:   Lab Results   Component Value Date/Time    WBC 4.6 08/18/2022 05:43 AM    RBC 2.73 08/18/2022 05:43 AM    HGB 8.7 08/18/2022 05:43 AM    HCT 27.5 08/18/2022 05:43 AM    .7 08/18/2022 05:43 AM    MCH 31.9 08/18/2022 05:43 AM    MCHC 31.6 08/18/2022 05:43 AM    RDW 16.0 08/18/2022 05:43 AM     08/18/2022 05:43 AM     BMP:    Lab Results   Component Value Date/Time    GLUCOSE 61 08/23/2022 09:15 AM     08/23/2022 09:15 AM    K 5.2 08/23/2022 09:15 AM     08/23/2022 09:15 AM    CO2 23 08/23/2022 09:15 AM    ANIONGAP 12 08/23/2022 09:15 AM BUN 42 08/23/2022 09:15 AM    CREATININE 2.96 08/23/2022 09:15 AM    BUNCRER 15 08/18/2022 05:43 AM    CALCIUM 9.4 08/23/2022 09:15 AM    LABGLOM 16 08/23/2022 09:15 AM    GFRAA 19 08/23/2022 09:15 AM    GFR      08/23/2022 09:15 AM     HFP:    Lab Results   Component Value Date/Time    PROT 7.4 08/02/2022 05:13 PM     CMP:    Lab Results   Component Value Date/Time    GLUCOSE 61 08/23/2022 09:15 AM     08/23/2022 09:15 AM    K 5.2 08/23/2022 09:15 AM     08/23/2022 09:15 AM    CO2 23 08/23/2022 09:15 AM    BUN 42 08/23/2022 09:15 AM    CREATININE 2.96 08/23/2022 09:15 AM    ANIONGAP 12 08/23/2022 09:15 AM    ALKPHOS 138 08/02/2022 05:13 PM    ALT 14 08/02/2022 05:13 PM    AST 20 08/02/2022 05:13 PM    BILITOT 0.48 08/02/2022 05:13 PM    LABALBU 3.6 08/02/2022 05:13 PM    ALBUMIN 0.9 08/02/2022 05:13 PM    LABGLOM 16 08/23/2022 09:15 AM    GFRAA 19 08/23/2022 09:15 AM    GFR      08/23/2022 09:15 AM    PROT 7.4 08/02/2022 05:13 PM    CALCIUM 9.4 08/23/2022 09:15 AM     PT/INR:    Lab Results   Component Value Date/Time    PROTIME 10.2 08/03/2022 04:54 AM    PROTIME 16.4 08/27/2015 12:00 AM    PROTIME 16.4 08/27/2015 12:00 AM    INR 0.9 08/03/2022 04:54 AM     PTT:   Lab Results   Component Value Date/Time    APTT 27.5 11/10/2021 03:50 PM     FLP:    Lab Results   Component Value Date/Time    CHOL 105 04/09/2015 12:00 AM    TRIG 168 04/09/2015 12:00 AM    HDL 43 04/09/2015 12:00 AM     U/A:    Lab Results   Component Value Date/Time    COLORU Dark Yellow 08/02/2022 09:46 PM    TURBIDITY Turbid 08/02/2022 09:46 PM    SPECGRAV 1.016 08/02/2022 09:46 PM    HGBUR TRACE 08/02/2022 09:46 PM    PHUR 6.5 08/02/2022 09:46 PM    PROTEINU 2+ 08/02/2022 09:46 PM    GLUCOSEU NEGATIVE 08/02/2022 09:46 PM    KETUA NEGATIVE 08/02/2022 09:46 PM    BILIRUBINUR NEGATIVE 08/02/2022 09:46 PM    UROBILINOGEN Normal 08/02/2022 09:46 PM    NITRU NEGATIVE 08/02/2022 09:46 PM    LEUKOCYTESUR LARGE 08/02/2022 09:46 PM     TSH: Lab Results   Component Value Date/Time    TSH 1.89 08/03/2022 04:30 PM        Radiology:  No results found. Consultations:    Consults:     Final Specialist Recommendations/Findings:   IP CONSULT TO NEPHROLOGY  IP CONSULT TO HOSPITALIST  IP CONSULT TO NEUROLOGY  IP CONSULT TO NEPHROLOGY  IP CONSULT TO INFECTIOUS DISEASES  IP CONSULT TO IV TEAM  IP CONSULT TO PHYSICAL MEDICINE REHAB      The patient was seen and examined on day of discharge and this discharge summary is in conjunction with any daily progress note from day of discharge. Discharge plan:     Disposition: skilled nursing facility    Physician Follow Up:          Requiring Further Evaluation/Follow Up POST HOSPITALIZATION/Incidental Findings: continue HD as indicated per nephrology. Further treatment and frequency pre their recommendations    Diet: regular diet and renal diet    Activity: As tolerated    Instructions to Patient: see attached     Discharge Medications:      Medication List        CHANGE how you take these medications      atorvastatin 10 MG tablet  Commonly known as: LIPITOR  Take 1 tablet by mouth in the morning. What changed:   medication strength  how much to take     insulin lispro 100 UNIT/ML Soln injection vial  Commonly known as: HUMALOG  Inject 12 Units into the skin in the morning and 12 Units at noon and 12 Units in the evening. Inject with meals. Substituted for Insulin lispro U-200 (HumaLOG KwikPen). .  What changed:   how much to take  when to take this  additional instructions            CONTINUE taking these medications      acetaminophen 325 MG tablet  Commonly known as: TYLENOL     aspirin 81 MG chewable tablet     carvedilol 3.125 MG tablet  Commonly known as: COREG     docusate sodium 100 MG capsule  Commonly known as: COLACE     febuxostat 40 MG Tabs tablet  Commonly known as: ULORIC     furosemide 80 MG tablet  Commonly known as: LASIX     gabapentin 300 MG capsule  Commonly known as: NEURONTIN     insulin glargine 100 UNIT/ML injection vial  Commonly known as: LANTUS     loperamide 2 MG capsule  Commonly known as: IMODIUM     melatonin 3 MG Tabs tablet     pantoprazole 40 MG tablet  Commonly known as: PROTONIX     ranolazine 500 MG extended release tablet  Commonly known as: RANEXA     sodium bicarbonate 650 MG tablet     tamsulosin 0.4 MG capsule  Commonly known as: FLOMAX            STOP taking these medications      Baclofen 5 MG tablet  Commonly known as: LIORESAL     busPIRone 10 MG tablet  Commonly known as: BUSPAR     DULoxetine 30 MG extended release capsule  Commonly known as: CYMBALTA     lidocaine-prilocaine 2.5-2.5 % cream  Commonly known as: EMLA     miconazole 2 % powder  Commonly known as: MICOTIN     mirtazapine 15 MG tablet  Commonly known as: REMERON     tiZANidine 4 MG tablet  Commonly known as: ZANAFLEX     traZODone 50 MG tablet  Commonly known as: DESYREL            ASK your doctor about these medications      linezolid 600 MG tablet  Commonly known as: ZYVOX  Take 1 tablet by mouth in the morning and 1 tablet before bedtime. Do all this for 12 doses. Stop after 8/11. Ask about: Should I take this medication?     meropenem  infusion  Commonly known as: MERREM  Infuse 1,000 mg intravenously in the morning for 6 days. Stop after 8/11 and pull the midline-Compound per protocol. .  Ask about: Should I take this medication?                Where to Get Your Medications        These medications were sent to [facility], [address] [place] [address], [address]  [address]      Phone: [phone]   linezolid 600 MG tablet       You can get these medications from any pharmacy    Bring a paper prescription for each of these medications  meropenem  infusion       Information about where to get these medications is not yet available    Ask your nurse or doctor about these medications  atorvastatin 10 MG tablet  insulin lispro 100 UNIT/ML Soln injection vial         Discharge Procedure Orders   Basic Metabolic Panel   Standing Status: Standing Number of Occurrences: 12 Standing Exp. Date: 08/09/23   Order Comments: Fax to [phone]     MARCIA (Mihirythe Lesch) - Jacquelyn Martinez MD, Nephrology, [place]   Referral Priority: Routine Referral Type: Eval and Treat   Referral Reason: Specialty Services Required   Referred to Provider: Herbert Meléndez Requested Specialty: Nephrology   Number of Visits Requested: 1       Time Spent on discharge is  31 mins in patient examination, evaluation, counseling as well as medication reconciliation, prescriptions for required medications, discharge plan and follow up. Electronically signed by   AFSANEH Antony NP  8/23/2022  1:42 PM      Thank you AFSANEH Blum CNP for the opportunity to be involved in this patient's care.

## 2022-08-30 ENCOUNTER — HOSPITAL ENCOUNTER (OUTPATIENT)
Age: 62
Setting detail: SPECIMEN
Discharge: HOME | End: 2022-08-30
Payer: MEDICARE

## 2022-08-30 LAB
ANION GAP SERPL CALCULATED.3IONS-SCNC: 11 MMOL/L (ref 9–17)
BUN BLDV-MCNC: 27 MG/DL (ref 8–23)
CALCIUM SERPL-MCNC: 9.4 MG/DL (ref 8.6–10.4)
CHLORIDE BLD-SCNC: 111 MMOL/L (ref 98–107)
CO2: 22 MMOL/L (ref 20–31)
CREAT SERPL-MCNC: 2.12 MG/DL (ref 0.5–0.9)
GFR AFRICAN AMERICAN: 28 ML/MIN
GFR NON-AFRICAN AMERICAN: 23 ML/MIN
GFR SERPL CREATININE-BSD FRML MDRD: ABNORMAL ML/MIN/{1.73_M2}
GLUCOSE BLD-MCNC: 215 MG/DL (ref 70–99)
POTASSIUM SERPL-SCNC: 4.2 MMOL/L (ref 3.7–5.3)
SODIUM BLD-SCNC: 144 MMOL/L (ref 135–144)

## 2022-08-30 PROCEDURE — 80048 BASIC METABOLIC PNL TOTAL CA: CPT

## 2022-08-31 ENCOUNTER — HOSPITAL ENCOUNTER (OUTPATIENT)
Dept: INTERVENTIONAL RADIOLOGY/VASCULAR | Age: 62
Discharge: HOME | End: 2022-09-02

## 2022-08-31 DIAGNOSIS — Z99.2: ICD-10-CM

## 2022-08-31 DIAGNOSIS — K76.9 LIVER DISEASE: ICD-10-CM

## 2022-08-31 PROCEDURE — 2709999900 HC NON-CHARGEABLE SUPPLY

## 2022-08-31 NOTE — BRIEF OP NOTE
Brief Postoperative Note    Jorge Renae  YOB: 1960  [id number]    Pre-operative Diagnosis: Recovered kidney function    Post-operative Diagnosis: Same    Procedure: Perm cath removal    Anesthesia: None    Surgeons/Assistants: ULICES Stanley    Estimated Blood Loss: less than 50     Complications: None    Specimens: Was Obtained:     Findings: Successful removal of right chest tunneled dialysis catheter.      Electronically signed by ULICES Mcclellan on 8/31/2022 at 2:54 PM

## 2022-09-03 NOTE — PROGRESS NOTES
FAMILY MEDICINE  - PROGRESS NOTE    Date:  1/11/2022  Goldy Organ  [id number]      Chief Complaint   Patient presents with    Back Pain         Interval History:  not changed much, she has no new complaints this am. Her pain has resolved. No significant events overnight. Specialists notes, labs & imaging reviewed. She is supposed to be discharged today to a SNF.       Subjective  Constitutional: positive for fatigue and obesity  Musculoskeletal:positive for muscle weakness and myalgias  Neurological: positive for memory problems and tremors  Endocrine: positive for diabetic symptoms including neuropathy and hyperglycemia:    Objective:    /62   Pulse 96   Temp 97.6 °F (36.4 °C)   Resp 18   Ht 5' 5\" (1.651 m)   Wt 260 lb 2.3 oz (118 kg)   SpO2 96%   BMI 43.29 kg/m²   General appearance - overweight  Mental status - drowsy  Eyes - not examined  Ears - bilateral TM's and external ear canals normal  Nose - normal and patent, no erythema, discharge or polyps  Mouth - mucous membranes moist, pharynx normal without lesions  Neck - supple, no significant adenopathy  Lymphatics - no palpable lymphadenopathy, no hepatosplenomegaly  Chest - clear to auscultation, no wheezes, rales or rhonchi, symmetric air entry  Heart - normal rate, regular rhythm, normal S1, S2, no murmurs, rubs, clicks or gallops  Abdomen - soft, nontender, nondistended, no masses or organomegaly  Breasts - not examined  Back exam - not examined  Neurological - neck supple without rigidity  Musculoskeletal - no joint tenderness, deformity or swelling  Extremities - peripheral pulses normal, no pedal edema, no clubbing or cyanosis  Skin - normal coloration and turgor, no rashes, no suspicious skin lesions noted    Data:   Medications:   Current Facility-Administered Medications   Medication Dose Route Frequency Provider Last Rate Last Admin    gabapentin (NEURONTIN) capsule 400 mg  400 mg Oral Nightly El Gray DO   400 mg at 01/10/22 2101    epoetin raphael-epbx (RETACRIT) injection 3,000 Units  3,000 Units SubCUTAneous Once per day on Mon Wed Fri Hemant Thompson MD   3,000 Units at 01/07/22 1428    sodium bicarbonate tablet 650 mg  650 mg Oral BID Hemant Thompson MD   650 mg at 01/10/22 2101    carvedilol (COREG) tablet 3.125 mg  3.125 mg Oral BID WC Hemant Thompson MD   3.125 mg at 01/10/22 1627    anticoagulant sodium citrate 4 % injection 1.9 mL  1.9 mL IntraCATHeter PRN Hemant Thompson MD   1.9 mL at 01/07/22 1238    anticoagulant sodium citrate 4 % injection 1.9 mL  1.9 mL IntraCATHeter PRN Hemant Thompson MD   1.9 mL at 01/07/22 1238    tiZANidine (ZANAFLEX) tablet 4 mg  4 mg Oral Q6H PRN Viv Cannon MD   4 mg at 12/30/21 0123    tiZANidine (ZANAFLEX) tablet 2 mg  2 mg Oral TID Viv Cannon MD   2 mg at 01/10/22 2110    lidocaine 4 % external patch 1 patch  1 patch TransDERmal Daily Nai Ulloa DO   1 patch at 01/10/22 3686    aspirin chewable tablet 81 mg  81 mg Oral Daily Reji Mccullough MD   81 mg at 01/10/22 1521    atorvastatin (LIPITOR) tablet 80 mg  80 mg Oral Daily Reji Mccullough MD   80 mg at 01/10/22 1521    busPIRone (BUSPAR) tablet 7.5 mg  7.5 mg Oral TID Reji Mccullough MD   7.5 mg at 01/10/22 2101    docusate sodium (COLACE) capsule 100 mg  100 mg Oral BID Reji Mccullough MD   100 mg at 01/10/22 2101    apixaban (ELIQUIS) tablet 5 mg  5 mg Oral BID Reji Mccullough MD   5 mg at 01/10/22 2101    febuxostat (ULORIC) tablet 40 mg  40 mg Oral Daily Reji Mccullough MD   40 mg at 01/10/22 0811    insulin glargine (LANTUS) injection vial 73 Units  73 Units SubCUTAneous BID Reji Mccullough MD   73 Units at 01/10/22 2105    pantoprazole (PROTONIX) tablet 40 mg  40 mg Oral QAM  Reji Mccullough MD   40 mg at 01/10/22 0630    polyethylene glycol (GLYCOLAX) packet 17 g  17 g Oral Daily Reji Mccullough MD   17 g at 01/10/22 0813    ranolazine (RANEXA) extended release tablet 1,000 mg  1,000 mg Oral BID Reji Mccullough MD   1,000 mg at 01/10/22 2101    senna (SENOKOT) tablet 17.2 mg  2 tablet Oral Daily PRN Reji Mccullough MD        [facility]) capsule 0.4 mg  0.4 mg Oral Daily Reji Mccullough MD   0.4 mg at 01/10/22 2238    traZODone (DESYREL) tablet 75 mg  75 mg Oral Nightly Reji Mccullough MD   75 mg at 01/10/22 2100    glucose (GLUTOSE) 40 % oral gel 15 g  15 g Oral PRN Reji Mccullough MD   15 g at 01/09/22 0708    dextrose 50 % IV solution  12.5 g IntraVENous PRN Reji Mccullough MD        glucagon (rDNA) injection 1 mg  1 mg IntraMUSCular PRN Reji Mccullough MD        dextrose 5 % solution  100 mL/hr IntraVENous PRN Reji Mccullough MD        sodium chloride flush 0.9 % injection 5-40 mL  5-40 mL IntraVENous 2 times per day Reji Mccullough MD   10 mL at 01/01/22 2218    sodium chloride flush 0.9 % injection 5-40 mL  5-40 mL IntraVENous PRN Reji Mccullough MD        0.9 % sodium chloride infusion  25 mL IntraVENous PRN Reji Mccullough MD        ondansetron (ZOFRAN-ODT) disintegrating tablet 4 mg  4 mg Oral Q8H PRN Reji Mccullough MD   4 mg at 01/08/22 0751    Or    ondansetron (ZOFRAN) injection 4 mg  4 mg IntraVENous Q6H PRN Reji Mccullough MD        polyethylene glycol [facility]) packet 17 g  17 g Oral Daily PRN Reji Mccullough MD        acetaminophen (TYLENOL) tablet 650 mg  650 mg Oral Q6H PRN Reji Mccullough MD   650 mg at 01/03/22 2114    Or    acetaminophen (TYLENOL) suppository 650 mg  650 mg Rectal Q6H PRN Reji Mccullough MD        insulin lispro (HUMALOG) injection vial 0-18 Units  0-18 Units SubCUTAneous TID SUJEY Mccullough MD   3 Units at 01/10/22 1628    insulin lispro (HUMALOG) injection vial 0-9 Units  0-9 Units SubCUTAneous Nightly Reji Mccullough MD 5 Units at 01/10/22 2105    HYDROcodone-acetaminophen (NORCO) 5-325 MG per tablet 1 tablet  1 tablet Oral Q6H PRN Elan Campbell MD   1 tablet at 01/10/22 2100       Intake/Output Summary (Last 24 hours) at 1/11/2022 0725  Last data filed at 1/10/2022 1603  Gross per 24 hour   Intake 1190 ml   Output --   Net 1190 ml     Recent Results (from the past 24 hour(s))   Basic Metabolic Panel w/ Reflex to MG    Collection Time: 01/10/22  8:59 AM   Result Value Ref Range    Glucose 163 (H) 70 - 99 mg/dL    BUN 50 (H) 8 - 23 mg/dL    CREATININE 3.67 (H) 0.50 - 0.90 mg/dL    Bun/Cre Ratio NOT REPORTED 9 - 20    Calcium 9.3 8.6 - 10.4 mg/dL    Sodium 137 135 - 144 mmol/L    Potassium 4.9 3.7 - 5.3 mmol/L    Chloride 98 98 - 107 mmol/L    CO2 24 20 - 31 mmol/L    Anion Gap 15 9 - 17 mmol/L    GFR Non-African American 12 (L) >60 mL/min    GFR African American 15 (L) >60 mL/min    GFR Comment          GFR Staging NOT REPORTED      -----------------------------------------------------------------  RAD:  EKG:  Micro:     Assessment & Plan:    Patient Active Problem List:     Atherosclerosis of coronary artery bypass graft of native heart without angina pectoris     Acute renal failure (HCC)     Diabetes mellitus due to underlying condition with diabetic nephropathy, with long-term current use of insulin (HCC)     Chronic diastolic heart failure (HCC)     Diabetic polyneuropathy associated with type 2 diabetes mellitus (HCC)     History of coronary artery bypass graft     Iron deficiency anemia     Spinal stenosis of lumbar region with neurogenic claudication     Mixed hyperlipidemia     Stage 4 chronic kidney disease (HCC)     Type 2 diabetes mellitus with kidney complication, with long-term current use of insulin (HCC)     Syncope and collapse     Obesity, Class II, BMI 35-39.9     Thyroid nodule greater than or equal to 1 cm in diameter incidentally noted on imaging study     Essential hypertension     Anemia in stage 4 chronic kidney disease ([facility]     Chronic ischemic heart disease     Ischemic stroke of frontal lobe (HCC)     Stroke-like symptoms     Morbid obesity with BMI of 40.0-44.9, adult (HCC)     Acute encephalopathy     Disequilibrium syndrome     Generalized weakness     Long-term memory loss     Muscle right arm weakness     Anxiety     Chronic midline low back pain with bilateral sciatica     Need for immunization against influenza     Altered mental status     Acute right-sided thoracic back pain     Tremor     Metabolic encephalopathy           Plan:  -Metabolic encephalopathy - mental status back at baseline. -ESRD on dialysis - Nephrology managing. -DM2 - stable.  -Emotional lability - Psychiatry input appreciated, normal grief reaction. -D/C planning ongoing - awaiting pre-cert to [address], hopefully it will be obtained tomorrow.  -Continue current treatments.  -Complete orders per chart.     See orders   Disposition:    Electronically signed by Gómez Ruth MD on 1/11/2022 at 7:25 AM

## 2022-09-06 ENCOUNTER — OFFICE VISIT (OUTPATIENT)
Dept: NEUROLOGY | Age: 62
End: 2022-09-06
Payer: MEDICARE

## 2022-09-06 ENCOUNTER — HOSPITAL ENCOUNTER (OUTPATIENT)
Age: 62
Setting detail: SPECIMEN
Discharge: HOME | End: 2022-09-06
Payer: MEDICARE

## 2022-09-06 VITALS
BODY MASS INDEX: 38.49 KG/M2 | SYSTOLIC BLOOD PRESSURE: 153 MMHG | WEIGHT: 231 LBS | HEART RATE: 79 BPM | HEIGHT: 65 IN | DIASTOLIC BLOOD PRESSURE: 76 MMHG

## 2022-09-06 DIAGNOSIS — E87.8 DISEQUILIBRIUM SYNDROME: Primary | ICD-10-CM

## 2022-09-06 DIAGNOSIS — E08.42 DIABETIC POLYNEUROPATHY ASSOCIATED WITH DIABETES MELLITUS DUE TO UNDERLYING CONDITION (HCC): ICD-10-CM

## 2022-09-06 DIAGNOSIS — F09 COGNITIVE DISORDER: ICD-10-CM

## 2022-09-06 LAB
ANION GAP SERPL CALCULATED.3IONS-SCNC: 13 MMOL/L (ref 9–17)
BUN BLDV-MCNC: 35 MG/DL (ref 8–23)
CALCIUM SERPL-MCNC: 9.3 MG/DL (ref 8.6–10.4)
CHLORIDE BLD-SCNC: 107 MMOL/L (ref 98–107)
CO2: 23 MMOL/L (ref 20–31)
CREAT SERPL-MCNC: 2.24 MG/DL (ref 0.5–0.9)
GFR AFRICAN AMERICAN: 27 ML/MIN
GFR NON-AFRICAN AMERICAN: 22 ML/MIN
GFR SERPL CREATININE-BSD FRML MDRD: ABNORMAL ML/MIN/{1.73_M2}
GLUCOSE BLD-MCNC: 288 MG/DL (ref 70–99)
POTASSIUM SERPL-SCNC: 3.5 MMOL/L (ref 3.7–5.3)
SODIUM BLD-SCNC: 143 MMOL/L (ref 135–144)

## 2022-09-06 PROCEDURE — 99215 OFFICE O/P EST HI 40 MIN: CPT | Performed by: PSYCHIATRY & NEUROLOGY

## 2022-09-06 PROCEDURE — 80048 BASIC METABOLIC PNL TOTAL CA: CPT

## 2022-09-06 ASSESSMENT — ENCOUNTER SYMPTOMS
EYES NEGATIVE: 1
RESPIRATORY NEGATIVE: 1
ALLERGIC/IMMUNOLOGIC NEGATIVE: 1
GASTROINTESTINAL NEGATIVE: 1

## 2022-09-06 NOTE — PROGRESS NOTES
Active Problem she was seen on consultation at [facility] in August for syncope . ESRD with hemodialysis . 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 She has ESRD on hemodialysis, HTN , DM on insulin  . Day of admission during the end of hemodialysis she became unresponsive with initial concern that she was asleep although found that she could not be aroused . On coming to she was confused disoriented unable to remember what was going on sent to [facility] ER . Head CT mild chronic small vessel disease . Her sister describes that has been having similar episodes of unresponsiveness during hemodialysis since September about ten episodes altogether associated with hypotension with no seizure activity . She will awaken form spell confused disoriented amnestic with spells being of variable duration lasting up to 4 weeks . In between sister says she is foggy headed with no clear memory complaints . There has been numerous changes made with dialysis exchange with her continuing episodes of unresponsiveness the one prior to yesterday being in June . She denies headache or focal motor, sensory,  bulbar or visual complaints  . Head CT mild chronic small vessel disease  . She was at [facility] on June 26 with confusion along with right side numbness . MRI of Head mild chronic periventricular small vessel ischemia . EEG mild diffuse slowing . She does have history of recurrent UTI. She is on aspirin 81 mg po qd and lipitor 80 mg po qd . The condition is she has been off hemodialysis altogether since August 2 seeing nephrologist Dr Gracy Henson . Creatinine has improved 2.12 . Her leg and body strength are doing better able to stamina using walker for support gettinng wined with weak with prolongued distances . There is improvement in cognition able to remember conversations . She lives with her mother in [place] .  Her sister and patient relate that she is able to remember and process new information although still has problem with older memory unable to remember what happens in some holidays or unable to recognize the house where she was born. In hospital MRI of Head chronic periventricular small vessel ischemia . Chronic sinusitis more right maxillary . EEG mild intermittent generalized slowing  . L16 774, folic acid 9.8 , TSH normal , RUSSELL negatuve.  There is numbness of bot feet with neuropathy having diabetes      Past Medical History:   Diagnosis Date    Arthritis     Backache, unspecified     Cerebral artery occlusion with cerebral infarction [facility])     CHF (congestive heart failure) (HCC)     Chronic kidney disease     Coronary atherosclerosis of artery bypass graft     COVID 1/31/2022    Cramp of limb     Gallstones     Hemodialysis patient ([facility]     Hyperlipidemia     Hypertension     Insomnia     Neuromuscular disorder ([facility]     Pneumonia     Psychiatric problem     Thyroid disease     Type II or unspecified type diabetes mellitus with renal manifestations, not stated as uncontrolled(250.40)     Type II or unspecified type diabetes mellitus without mention of complication, not stated as uncontrolled     Unspecified vitamin D deficiency        Past Surgical History:   Procedure Laterality Date    ANKLE FRACTURE SURGERY      AV FISTULA CREATION  12/14/2021    BACK SURGERY      BREAST SURGERY      CARDIAC SURGERY      CARPAL TUNNEL RELEASE      CHOLECYSTECTOMY, OPEN N/A     COLONOSCOPY      CORONARY ARTERY BYPASS GRAFT      x3    DIALYSIS FISTULA CREATION Left 12/14/2021    LEFT AV FISTULA CREATION UPPER EXTREMITY performed by Gregory Giron MD at [address]      IR TUNNELED CATHETER PLACEMENT GREATER THAN 5 YEARS  8/18/2021    IR TUNNELED CATHETER PLACEMENT GREATER THAN 5 YEARS 8/18/2021 [facility] SPECIAL PROCEDURES    KNEE ARTHROSCOPY      right    OTHER SURGICAL HISTORY  04/06/2022    cvc exchange    TONSILLECTOMY         Family History   Problem Relation Age of Onset    Diabetes Father     Heart Failure Father        Social History     Socioeconomic History    Marital status: Single     Spouse name: None    Number of children: None    Years of education: None    Highest education level: None   Tobacco Use    Smoking status: Never    Smokeless tobacco: Never   Vaping Use    Vaping Use: Never used   Substance and Sexual Activity    Alcohol use: No    Drug use: No    Sexual activity: Not Currently     Social Determinants of Health     Financial Resource Strain: Low Risk     Difficulty of Paying Living Expenses: Not very hard   Food Insecurity: No Food Insecurity    Worried About Running Out of Food in the Last Year: Never true    Ran Out of Food in the Last Year: Never true   Transportation Needs: No Transportation Needs    Lack of Transportation (Medical): No    Lack of Transportation (Non-Medical): No   Physical Activity: Inactive    Days of Exercise per Week: 0 days    Minutes of Exercise per Session: 0 min   Stress: Stress Concern Present    Feeling of Stress : To some extent   Social Connections: Socially Isolated    Frequency of Communication with Friends and Family: More than three times a week    Frequency of Social Gatherings with Friends and Family: More than three times a week    Attends Islam Services: Never    Active Member of Clubs or Organizations: No    Attends Club or Organization Meetings: Never    Marital Status: Never    Housing Stability: [address] to Pay for Housing in the Last Year: No    Number of [place] in the Last Year: 1    Unstable Housing in the Last Year: No       Current Outpatient Medications   Medication Sig Dispense Refill    Insulin Pen Needle (EASY TOUCH PEN NEEDLES) 29G X 12MM MISC 1 each by Does not apply route daily 100 each 5    atorvastatin (LIPITOR) 10 MG tablet Take 1 tablet by mouth in the morning. 30 tablet 0    gabapentin (NEURONTIN) 300 MG capsule Take 300 mg by mouth in the morning.       carvedilol (COREG) 3.125 MG tablet Take 3.125 mg by mouth in the morning and 3.125 mg in the evening. Take with meals. furosemide (LASIX) 80 MG tablet Take 80 mg by mouth in the morning and 80 mg before bedtime. docusate sodium (COLACE) 100 MG capsule Take 100 mg by mouth in the morning and 100 mg before bedtime. tamsulosin (FLOMAX) 0.4 MG capsule Take 0.4 mg by mouth in the morning. aspirin 81 MG chewable tablet Take 81 mg by mouth in the morning.       acetaminophen (TYLENOL) 325 MG tablet Take 650 mg by mouth every 6 hours as needed for Pain      loperamide (IMODIUM) 2 MG capsule Take 2 mg by mouth 2 times daily as needed for Diarrhea      carvedilol (COREG) 3.125 MG tablet Take 1 tablet by mouth 2 times daily 60 tablet 11    insulin glargine (BASAGLAR KWIKPEN) 100 UNIT/ML injection pen Inject 50 Units into the skin 2 times daily (Patient taking differently: Inject 55 Units into the skin 2 times daily) 5 pen 11    melatonin 10 MG CAPS capsule Take 10 mg by mouth nightly      furosemide (LASIX) 80 MG tablet Take 1 tablet by mouth 2 times daily 60 tablet 0    sodium bicarbonate 650 MG tablet Take 1 tablet by mouth 3 times daily (Patient taking differently: Take 650 mg by mouth as needed) 90 tablet 0    ReliOn Lancets Micro-Thin 33G MISC USE AS DIRECTED EVERY DAY      HUMALOG 100 UNIT/ML injection vial Inject into the skin 3 times daily (before meals) Indications: 15 units and sliding scale (patient reports only the sliding scale)       Blood Glucose Monitoring Suppl (TRUE METRIX GO GLUCOSE METER) w/Device KIT 1 each by Does not apply route 4 times daily 1 kit 1    Lancets MISC 1 each by Does not apply route daily 100 each 11    docusate sodium (COLACE) 100 MG capsule Take 1 capsule by mouth 2 times daily 60 capsule 0    tamsulosin (FLOMAX) 0.4 MG capsule Take 1 capsule by mouth daily 30 capsule 3    aspirin 81 MG chewable tablet Take 1 tablet by mouth daily 30 tablet 0    ranolazine (RANEXA) 1000 MG extended release tablet Take 1 tablet by mouth 2 times daily 60 tablet 0    pantoprazole (PROTONIX) 40 MG tablet Take 1 tablet by mouth every morning (before breakfast) (Patient taking differently: Take 40 mg by mouth in the morning, at noon, and at bedtime) 30 tablet 0    insulin lispro (HUMALOG) 100 UNIT/ML SOLN injection vial Inject 12 Units into the skin in the morning and 12 Units at noon and 12 Units in the evening. Inject with meals. Substituted for Insulin lispro U-200 (HumaLOG KwikPen). . (Patient not taking: Reported on 9/6/2022)      melatonin 3 MG TABS tablet Take 10 mg by mouth nightly (Patient not taking: Reported on 9/6/2022)      sodium bicarbonate 650 MG tablet Take 650 mg by mouth in the morning and 650 mg at noon and 650 mg before bedtime. (Patient not taking: No sig reported)      ranolazine (RANEXA) 500 MG extended release tablet Take 1,000 mg by mouth in the morning and 1,000 mg before bedtime. (Patient not taking: Reported on 9/6/2022)      pantoprazole (PROTONIX) 40 MG tablet Take 40 mg by mouth in the morning. (Patient not taking: Reported on 9/6/2022)      insulin glargine (LANTUS) 100 UNIT/ML injection vial Inject 50 Units into the skin 2 times daily (Patient not taking: Reported on 9/6/2022)      febuxostat (ULORIC) 40 MG TABS tablet Take 40 mg by mouth in the morning. (Patient not taking: Reported on 9/6/2022)      gabapentin (NEURONTIN) 300 MG capsule Take 300 mg by mouth 2 times daily. (Patient not taking: Reported on 9/6/2022)      tiZANidine (ZANAFLEX) 4 MG tablet Take 4 mg by mouth every 6 hours as needed (Patient not taking: Reported on 9/6/2022)      lidocaine-prilocaine (EMLA) 2.5-2.5 % cream Apply topically as needed for Pain Indications: Apply to dialysis site Apply topically as needed.   (Patient not taking: Reported on 9/6/2022)      febuxostat (ULORIC) 40 MG TABS tablet Take 1 tablet by mouth daily (Patient not taking: Reported on 9/6/2022) 30 tablet 0     No current facility-administered medications for this visit. Allergies   Allergen Reactions    Adhesive Tape Other (See Comments) and Rash     Other reaction(s): Unknown    Metformin And Related Diarrhea    Ace Inhibitors Other (See Comments)     Cough, states not good for her kidneys    Iv Dye [Iodides]      Stage 4 kidney disease    Nsaids      CANNOT TAKE D/T KIDNEY DISEASE    Metformin And Related Hives, Itching and Rash     Stage 4 kidney disease. Review of Systems     Vitals:    09/06/22 1217   BP: (!) 153/76   Pulse: 79     weight: 231 lb (104.8 kg)      Review of Systems   Constitutional: Negative. HENT: Negative. Eyes: Negative. Respiratory: Negative. Cardiovascular: Negative. Gastrointestinal: Negative. Endocrine: Negative. Genitourinary: Negative. Musculoskeletal: Negative. Allergic/Immunologic: Negative. Neurological: Negative. Hematological: Negative. Psychiatric/Behavioral: Negative. Neurological Examination  Constitutional .General exam well groomed   Head/Ears /Nose/Throat: external ear . Normal exam  Neck and thyroid . Normal size. No bruits  Respiratory . Breathsounds clear bilaterally  Cardiovascular: Auscultation of heart with regular rate and rhythm  Musculoskeletal. Muscle bulk and tone normal                                                           Muscle strength 5/5 strength throughout                                                                                No dysmetria or dysdiadokinesis  No tremor   Normal fine motor  Gait mild imbalance   Orientation Alert and oriented x 3 . Tuesday September 4, 2022 . President Manjinder Webster.  ?. WORLD unable to spell forwards and backwards . Serial 7 to 72  Attention and concentration normal  Short term memory 3 words out of 3 in one minute   Language process and speech normal . No aphasia   Cranial nerve 2 normal acuety and visual fields  Cranial nerve 3, 4 and 6 . Extraocular muscles are intact .  Pupils are equal and reactive   Cranial nerve 5 . Intact corneal reflex. Normal facial sensation  Cranial nerve 7 normal exam   Cranial nerve 8. Grossly intact hearing   Cranial nerve 9 and 10. Symmetric palate elevation   Cranial nerve 11 , 5 out of 5 strength   Cranial Nerve 12 midline tongue . No atrophy  Sensation . Decreased pinprick and light touch stocking level  Deep Tendon Reflexes absent ankle levels   Plantar response flexor bilaterally      ASSESSMENT/PLAN      Diagnosis Orders   1. Disequilibrium syndrome        2. Cognitive disorder        3.  Diabetic polyneuropathy associated with diabetes mellitus due to underlying condition [facility])        Patient has had dysequilibrium syndrome with improvement in ability to process new memory       As above

## 2022-09-14 RX ORDER — ALBUTEROL SULFATE 90 UG/1
4 AEROSOL, METERED RESPIRATORY (INHALATION) PRN
OUTPATIENT
Start: 2022-09-14

## 2022-09-14 RX ORDER — EPINEPHRINE 1 MG/ML
0.3 INJECTION, SOLUTION, CONCENTRATE INTRAVENOUS PRN
OUTPATIENT
Start: 2022-09-14

## 2022-09-14 RX ORDER — SODIUM CHLORIDE 9 MG/ML
INJECTION, SOLUTION INTRAVENOUS CONTINUOUS
OUTPATIENT
Start: 2022-09-14

## 2022-09-14 RX ORDER — ACETAMINOPHEN 325 MG/1
650 TABLET ORAL
OUTPATIENT
Start: 2022-09-14

## 2022-09-14 RX ORDER — ONDANSETRON 2 MG/ML
8 INJECTION INTRAMUSCULAR; INTRAVENOUS
OUTPATIENT
Start: 2022-09-14

## 2022-09-14 RX ORDER — DIPHENHYDRAMINE HYDROCHLORIDE 50 MG/ML
50 INJECTION INTRAMUSCULAR; INTRAVENOUS
OUTPATIENT
Start: 2022-09-14

## 2022-09-20 ENCOUNTER — HOSPITAL ENCOUNTER (OUTPATIENT)
Age: 62
Setting detail: SPECIMEN
Discharge: HOME | End: 2022-09-20
Payer: MEDICARE

## 2022-09-20 LAB
FERRITIN: 127 NG/ML (ref 13–150)
HCT VFR BLD CALC: 30.6 % (ref 36–46)
HEMOGLOBIN: 10.6 G/DL (ref 12–16)
IRON SATURATION: 26 % (ref 20–55)
IRON: 55 UG/DL (ref 37–145)
MCH RBC QN AUTO: 33.1 PG (ref 26–34)
MCHC RBC AUTO-ENTMCNC: 34.6 G/DL (ref 31–37)
MCV RBC AUTO: 95.7 FL (ref 80–100)
PDW BLD-RTO: 15.4 % (ref 11.5–14.9)
PLATELET # BLD: 169 K/UL (ref 150–450)
PMV BLD AUTO: 8.3 FL (ref 6–12)
RBC # BLD: 3.2 M/UL (ref 4–5.2)
TOTAL IRON BINDING CAPACITY: 212 UG/DL (ref 250–450)
UNSATURATED IRON BINDING CAPACITY: 157 UG/DL (ref 112–347)
WBC # BLD: 5 K/UL (ref 3.5–11)

## 2022-09-20 PROCEDURE — 82728 ASSAY OF FERRITIN: CPT

## 2022-09-20 PROCEDURE — 85027 COMPLETE CBC AUTOMATED: CPT

## 2022-09-20 PROCEDURE — 83540 ASSAY OF IRON: CPT

## 2022-09-20 PROCEDURE — 83550 IRON BINDING TEST: CPT

## 2022-09-27 ENCOUNTER — HOSPITAL ENCOUNTER (OUTPATIENT)
Dept: INFUSION THERAPY | Age: 62
Discharge: HOME | End: 2022-09-27
Payer: MEDICARE

## 2022-09-27 DIAGNOSIS — N18.4 ANEMIA IN STAGE 4 CHRONIC KIDNEY DISEASE (HCC): ICD-10-CM

## 2022-09-27 DIAGNOSIS — D63.1 ANEMIA IN STAGE 4 CHRONIC KIDNEY DISEASE (HCC): ICD-10-CM

## 2022-09-27 LAB
FERRITIN: 117 NG/ML (ref 13–150)
HCT VFR BLD CALC: 33.8 % (ref 36–46)
HEMOGLOBIN: 11.3 G/DL (ref 12–16)
IRON SATURATION: 38 % (ref 20–55)
IRON: 83 UG/DL (ref 37–145)
MCH RBC QN AUTO: 32.6 PG (ref 26–34)
MCHC RBC AUTO-ENTMCNC: 33.5 G/DL (ref 31–37)
MCV RBC AUTO: 97.2 FL (ref 80–100)
PDW BLD-RTO: 15.4 % (ref 12.5–15.4)
PLATELET # BLD: 188 K/UL (ref 140–450)
PMV BLD AUTO: 7.7 FL (ref 6–12)
RBC # BLD: 3.47 M/UL (ref 4–5.2)
TOTAL IRON BINDING CAPACITY: 217 UG/DL (ref 250–450)
UNSATURATED IRON BINDING CAPACITY: 134 UG/DL (ref 112–347)
WBC # BLD: 6 K/UL (ref 3.5–11)

## 2022-09-27 PROCEDURE — 83550 IRON BINDING TEST: CPT

## 2022-09-27 PROCEDURE — 82728 ASSAY OF FERRITIN: CPT

## 2022-09-27 PROCEDURE — 83540 ASSAY OF IRON: CPT

## 2022-09-27 PROCEDURE — 85027 COMPLETE CBC AUTOMATED: CPT

## 2022-09-27 PROCEDURE — 36415 COLL VENOUS BLD VENIPUNCTURE: CPT

## 2022-09-29 NOTE — PROGRESS NOTES
After discussing with patient and sisters, Tha Yvan and Ximena Culp, all agree extra rehab would benefit patient. Agreeable to [facility] or [facility] of [address] [place] [address]. Message sent to Dr. Adrianna Rome requesting PM&R consult. Pt. Resting in NAD; Sitter at bedside in direct view of patient.

## 2022-10-11 ENCOUNTER — HOSPITAL ENCOUNTER (OUTPATIENT)
Dept: INFUSION THERAPY | Age: 62
Discharge: HOME | End: 2022-10-11
Payer: MEDICARE

## 2022-10-11 LAB
HCT VFR BLD CALC: 34.7 % (ref 36–46)
HEMOGLOBIN: 11.6 G/DL (ref 12–16)

## 2022-10-11 PROCEDURE — 85018 HEMOGLOBIN: CPT

## 2022-10-11 PROCEDURE — 36415 COLL VENOUS BLD VENIPUNCTURE: CPT

## 2022-10-11 PROCEDURE — 85014 HEMATOCRIT: CPT

## 2022-10-22 NOTE — FLOWSHEET NOTE
12/25/21 1116   Encounter Summary   Services provided to: Patient   Referral/Consult From: [facility] Family members   Continue Visiting   (12-25-21)   Complexity of Encounter Moderate   Length of Encounter 15 minutes   Spiritual Assessment Completed Yes   Spiritual/Restorationist   Type Spiritual support   Assessment Approachable; Anxious   Intervention Active listening;Explored feelings, thoughts, concerns;Explored coping resources;Prayer;Sustaining presence/ Ministry of presence; Discussed illness/injury and it's impact   Outcome Expressed gratitude;Engaged in conversation;Expressed feelings/needs/concerns;Receptive · Hold home lisinopril/hydrochlorothiazide and cardizem

## 2022-10-25 ENCOUNTER — HOSPITAL ENCOUNTER (OUTPATIENT)
Dept: INFUSION THERAPY | Age: 62
Discharge: HOME | End: 2022-10-25
Payer: COMMERCIAL

## 2022-10-25 DIAGNOSIS — N18.4 ANEMIA IN STAGE 4 CHRONIC KIDNEY DISEASE (HCC): ICD-10-CM

## 2022-10-25 DIAGNOSIS — I10 ESSENTIAL HYPERTENSION: ICD-10-CM

## 2022-10-25 DIAGNOSIS — N18.4 CKD (CHRONIC KIDNEY DISEASE), STAGE IV (HCC): ICD-10-CM

## 2022-10-25 DIAGNOSIS — D63.1 ANEMIA IN STAGE 4 CHRONIC KIDNEY DISEASE (HCC): ICD-10-CM

## 2022-10-25 LAB
HCT VFR BLD CALC: 37.4 % (ref 36–46)
HEMOGLOBIN: 12.1 G/DL (ref 12–16)
MCH RBC QN AUTO: 31.3 PG (ref 26–34)
MCHC RBC AUTO-ENTMCNC: 32.4 G/DL (ref 31–37)
MCV RBC AUTO: 96.7 FL (ref 80–100)
PDW BLD-RTO: 15.1 % (ref 12.5–15.4)
PLATELET # BLD: 174 K/UL (ref 140–450)
PMV BLD AUTO: 8.4 FL (ref 6–12)
RBC # BLD: 3.87 M/UL (ref 4–5.2)
WBC # BLD: 5.9 K/UL (ref 3.5–11)

## 2022-10-25 PROCEDURE — 85027 COMPLETE CBC AUTOMATED: CPT

## 2022-10-25 PROCEDURE — 36415 COLL VENOUS BLD VENIPUNCTURE: CPT

## 2022-11-02 ENCOUNTER — HOSPITAL ENCOUNTER (OUTPATIENT)
Dept: PHYSICAL THERAPY | Age: 62
Setting detail: THERAPIES SERIES
Discharge: HOME | End: 2022-11-02
Payer: COMMERCIAL

## 2022-11-02 PROCEDURE — 97162 PT EVAL MOD COMPLEX 30 MIN: CPT

## 2022-11-02 NOTE — CONSULTS
[facility] Outpatient Physical Therapy  [address] [place] [address]. [address]         Phone: [phone]       Fax: [phone]     Physical Therapy Spine Evaluation    Date:  2022  Patient: Milton Madrigal  : 1960  MRN: [id number]  Physician: Everett Alanis     Insurance: Dutch Harbortona gomez, then Laurel Tucker  Medical Diagnosis: back pain M54.41, M54.42 Add LE weakness  R53.1 Rehab Codes: M54.5;  R53.1  Onset Date: referral 10/17/22  Next 's appt.: PRN  Visit Count:    Cancel/No Show: 0/0    Subjective: Patient presents to therapy with complaints of midback pain with severe thoracic pain, low back pain, and complaints of (B) LE numbness. Patient with (+) history of lymphedema and significant swelling issues, stated that she cannot feel her feet due to the diabetic neuropathy issues that she has. Patient stated that she was hospitalized on 22- this was due to issues with high blood sugar, disorientation. Patient stated she was in the hospitalized for about 1 week and then was in rehab facility for an extended period of time (around an additional month). Patient with multiple medical issues, see below for more information. Patient also notes severe midback pain with attempting to lay on her back, notes increased complaints of low back pain and LE weakness with prolonged walking and with attempting to do stairs. Patient was limited with balance with clinical balance testing as well.     CC: low back pain, mid back pain, (B) LE weakness, balance and general fatigue issues  HPI: hospitalization ~ one month ago, referral dated 10/17/22    PMHx: [] Unremarkable [x] Diabetes [x] HTN  [] Pacemaker   [x] MI/Heart Problems [] Cancer [x] Arthritis [] Other:              [x] Refer to full medical chart  In EPIC     Comorbidities:   [x] Obesity [] Dialysis  [] Other:   [] Asthma/COPD [] Dementia [] Other:   [x] Stroke [] Sleep apnea [] Other:   [x] Vascular disease [] Rheumatic disease [] Other: Tests: [] X-Ray: [x] MRI: See below brain [] Other:  Chronic microvascular disease without acute intracranial abnormality. Chronic sinusitis most pronounced in the right maxillary sinus. BRAIN MRI    Medications: [x] Refer to full medical record [] None [] Other:  Allergies:      [x] Refer to full medical record [] None [] Other:    Function:  Hand Dominance  [] Right  [] Left  Patient lives with: Mother (takes care of mother)   In what type of home [x]  One story   [] Two story   [] Split level   Number of stairs to enter None- ramp   With handrail on the []  Right to enter   [] Left to enter   Bathroom has a []  Tub only  [] Tub/shower combo   [x] Walk in shower    []  Grab bars   Washing machine is on []  Main level   [] Second level   [] Basement   Employer    Job Status []  Normal duty   [] Light duty   [] Off due to condition    []  Retired   [x] Not employed   [] Disability  [] Other:  []  Return to work:    Work activities/duties    States limited with sister's house- stairs;     ADL/IADL Previous level of function Current level of function Who currently assists the patient with task   Bathing  [] Independent  [] Assist [] Independent  [] Assist    Dress/grooming [] Independent  [] Assist [] Independent  [] Assist    Transfer/mobility [] Independent  [] Assist [] Independent  [] Assist    Feeding [] Independent  [] Assist [] Independent  [] Assist    Toileting [] Independent  [] Assist [] Independent  [] Assist    Driving [] Independent  [] Assist [] Independent  [] Assist    Housekeeping [] Independent  [] Assist [] Independent  [x] Assist Difficulty; avoids bending and squatting   Grocery shop/meal prep [] Independent  [] Assist [] Independent  [x] Assist Difficulty prolonged walking; avoids     Gait Prior level of function Current level of function    [] Independent  [x] Assist [] Independent  [] Assist   Device: [] Independent [] Independent    [] Straight Cane [] Quad cane [] Straight Cane [] Quad cane    [] Standard walker [x] Rolling walker   [] 4 wheeled walker [] Standard walker [] Rolling walker   [] 4 wheeled walker    [] Wheelchair [] Wheelchair   Previously able to ambulate as needed for distance with rolling walker and for short distances around house without (A) device safely. Pain:  [x] Yes  [] No Location: low back, mid back, (B) LEs Pain Rating: (0-10 scale) 4/10  Pain altered Tx:  [] Yes  [] No  Action:    Symptoms:  [] Improving [] Worsening [] Same  Better:  [] AM    [] PM    [] Sit    [] Rise/Sit    []Stand    [] Walk    [] Lying    [] Other:  Worse: [] AM    [] PM    [] Sit    [] Rise/Sit    []Stand    [] Walk    [] Lying    [] Bend                             [] Valsalva    [] Other:  Sleep: [] OK    [] Disturbed    Objective:  LE strength:  4/5 (R) hip flexion, abduction, adduction, knee flexion, extension, ankle DF; 4+/5 (L)    (B) squat to 50 degrees    Balance:  Tandem balance (R) behind 6 seconds, (L) 8 seconds    SL balance (R) unable; (L) less than 5 seconds    Thoracic ROM:  (B) trunk rotation 35 degrees with limitations with end range ROM (B)    Able to ambulate up to 250' with use of walker. FUNCTION Normal Difficult Unable   Sitting [x] [] []   Standing [] [x] []   Ambulation [] [x] []   Groom/Dress [] [x] []   Lift/Carry [] [x] []   Stairs [] [x] []   Bending [] [x] []   OH reach [] [] []   Sit to Stand [] [] []                                   Marinelli Fall Risk Assessment    Risk Factor Scale  Score   History of Falls [] Yes  [] No 25  0 25   Secondary Diagnosis [] Yes  [] No 15  0 0   Ambulatory Aid [] Furniture  [] Crutches/cane/walker  [] None/bedrest/wheelchair/nurse 30  15  0 15   IV/Heparin Lock [] Yes  [] No 20  0 0   Gait/Transferring [] Impaired  [] Weak  [] Normal/bedrest/immobile 20  10  0 10   Mental Status [] Forgets limitations  [] Oriented to own ability 15  0 0      Total:50     Based on the Assessment score: check the appropriate box.     []  No intervention needed   Low =   Score of 0-24    []  Use standard prevention interventions Moderate =  Score of 24-44   [] Give patient handout and discuss fall prevention strategies   [] Establish goal of education for patient/family RE: fall prevention strategies    [x]  Use high risk prevention interventions High = Score of 45 and higher   [x] Give patient handout and discuss fall prevention strategies   [x] Establish goal of education for patient/family Re: fall prevention strategies   [x] Discuss lifeline / other resources    Electronically signed by:   Jessica Rooney PT  Date: 11/2/2022     Comments:    Assessment: Patient with limited thoracic ROM, limited with prolonged walking, limited with doing stairs at sister's house, limited with clinical balance testing, limited with LE strength testing. Patient would continue to benefit from skilled physical therapy services in order to: work on generalized LE strength and function and decreased back pain while improving mobility at the thoracic spine. Problem list, as detailed above:   [x] ? Back Pain: 4/10 up to 8/10 at worst    [x] ? ROM: (B) thoracic rotation at end range     [x] ? Strength: 4/5 (R) side, limited with squatting  [x] ? Function: limited with balance during gait, tolerance of stairs      STG: (to be met in 8 treatments)  ? Pain: 0-1/10 to allow globally improved function  ? ROM: 45 degrees (B) rotation to allow improved turning while in bed  ? Strength: 4+/5 (R) sided strength to allow full functional tolerance of stairs, sit to stand  ? Function: improved balance with clinical testing to allow full gait without risk of falls, able to do 6\" stairs in the clinic, ambulates from parking lot to lobby with minimal to no difficulty and less use of walker grossly with decreased UE (A) grossly  Independent with Home Exercise Programs  Demonstrate Knowledge of fall prevention  LTG: (to be met in 12 treatments)  ?  Pain: 0/10 to allow globally improved function  ? ROM: 45 degrees (B) rotation to allow improved turning while in bed  ? Strength: 4+/5 (R) sided strength to allow full functional tolerance of stairs, sit to stand  ? Function: improved balance with clinical testing to allow full gait without risk of falls, able to do 8\" stairs in the clinic to allow full tolerance of stairs while visiting sister; ambulates for short distances without use of (A) device   Independent with Home Exercise Programs  Demonstrate Knowledge of fall prevention      Patient goals:getting strength      Functional Assessment Used: optimal 11/3 66% limited  Current Status Score:  Goal Status Sore: 5/3    Evaluation Complexity:  History (Personal factors, comorbidities) [] 0 [x] 1-2 [] 3+   Exam (limitations, restrictions) [] 1-2 [x] 3 [] 4+   Clinical presentation (progression) [] Stable [x] Evolving  [] Unstable   Decision Making [] Low [x] Moderate [] High    [] Low Complexity [x] Moderate Complexity [] High Complexity       Rehab Potential:  [] Good  [x] Fair  [] Poor   Suggested Professional Referral:  [x] No  [] Yes:  Barriers to Goal Achievement[de-identified]  [x] No  [] Yes:  Domestic Concerns:  [x] No  [] Yes:    Pt. Education:  [x] Plans/Goals, Risks/Benefits discussed  [x] Home exercise program  Method of Education: [x] Verbal  [] Demo  [x] Written  Comprehension of Education:  [x] Verbalizes understanding. [] Demonstrates understanding. [] Needs Review. [] Demonstrates/verbalizes understanding of HEP/Ed previously given.     Treatment Plan:  [x] Therapeutic Exercise   08707  [] Iontophoresis: 4 mg/mL Dexamethasone Sodium Phosphate  mAmin  72153   [] Therapeutic Activity  34816 [] Vasopneumatic cold with compression  94218    [x] Gait Training   57229 [] Ultrasound   36680   [x] Neuromuscular Re-education  81251 [] Electrical Stimulation Unattended  54362   [x] Manual Therapy  56061 [] Electrical Stimulation Attended  70714   [x] Instruction in HEP  [] Lumbar/Cervical Traction  [id number]   [] Aquatic Therapy   [id number] [] Cold/hotpack    [] Massage   [id number]      [] Dry Needling, 1 or 2 muscles  68625   [] Biofeedback, first 15 minutes   93895  [] Biofeedback, additional 15 minutes   25819 [] Dry Needling, 3 or more muscles  57250       []  Medication allergies reviewed for use of    Dexamethasone Sodium Phosphate 4mg/ml     with iontophoresis treatments. Pt is not allergic. Frequency:  2-3 x/week for 12 visits    Todays Treatment:  Modalities:   Precautions:  Exercises: below exercises for HEP  Exercise Reps/ Time Weight/ Level Comments   Postural education   Sitting posture, support   Thoracic rotation seated with wand 10x 5\" (B)     Thoracic extension seated with wand 10 x 5\"     Scapular retraction seated 10x           Other:    Specific Instructions for next treatment:    Treatment Charges: Mins Units   [x] Evaluation       []  Low       [x]  Moderate       []  High 45 1   []  Modalities     []  Ther Exercise     []  Manual Therapy     []  Ther Activities     []  Aquatics     []  Neuromuscular     []  Gait Training     []  Dry Needling           1-2 muscles     []  Dry Needling           3 or more muscles     [] Vasocompression     []  Other       TOTAL TREATMENT TIME: 45    Time in: 1pm      Time out: 150pm    Electronically signed by: Jean Quezada PT        Physician Signature:________________________________Date:__________________  By signing above or cosigning this note, I have reviewed this plan of care and certify a need for medically necessary rehabilitation services.      *PLEASE SIGN ABOVE AND FAX BACK ALL PAGES*

## 2022-11-21 ENCOUNTER — HOSPITAL ENCOUNTER (OUTPATIENT)
Dept: PHYSICAL THERAPY | Age: 62
Setting detail: THERAPIES SERIES
Discharge: HOME | End: 2022-11-21
Payer: COMMERCIAL

## 2022-11-21 NOTE — FLOWSHEET NOTE
[x] [facility] ([facility]) - [facility] & Therapy  [address]  [place]: [phone]   F: [phone]     Physical Therapy Cancel/No Show note    Date: 2022  Patient: Tonya Camara  : 1960  MRN: [id number]    Visit Count:   Cancels/No Shows to date:     For today's appointment patient:    [x]  Cancelled    [] Rescheduled appointment    [] No-show     Reason given by patient:    []  Patient ill    []  Conflicting appointment    [] No transportation      [] Conflict with work    [] No reason given    [] Weather related    [] COVID-19    [x] Other:      Comments:  Per , pt called to cancel due to being out of town.       [] Next appointment was confirmed    Electronically signed by: Luís Gordon PTA

## 2022-11-23 ENCOUNTER — APPOINTMENT (OUTPATIENT)
Dept: PHYSICAL THERAPY | Age: 62
End: 2022-11-23
Payer: COMMERCIAL

## 2022-12-01 ENCOUNTER — HOSPITAL ENCOUNTER (OUTPATIENT)
Dept: PHYSICAL THERAPY | Age: 62
Setting detail: THERAPIES SERIES
Discharge: HOME | End: 2022-12-01
Payer: COMMERCIAL

## 2022-12-01 ENCOUNTER — HOSPITAL ENCOUNTER (OUTPATIENT)
Age: 62
Setting detail: SPECIMEN
Discharge: HOME | End: 2022-12-01

## 2022-12-01 DIAGNOSIS — N18.4 ANEMIA IN STAGE 4 CHRONIC KIDNEY DISEASE (HCC): ICD-10-CM

## 2022-12-01 DIAGNOSIS — N18.4 CKD (CHRONIC KIDNEY DISEASE), STAGE IV (HCC): ICD-10-CM

## 2022-12-01 DIAGNOSIS — I10 ESSENTIAL HYPERTENSION: ICD-10-CM

## 2022-12-01 DIAGNOSIS — D63.1 ANEMIA IN STAGE 4 CHRONIC KIDNEY DISEASE (HCC): ICD-10-CM

## 2022-12-01 LAB
ALBUMIN SERPL-MCNC: 3.6 G/DL (ref 3.5–5.2)
ALBUMIN/GLOBULIN RATIO: 1.1 (ref 1–2.5)
ALP BLD-CCNC: 146 U/L (ref 35–104)
ALT SERPL-CCNC: 11 U/L (ref 5–33)
ANION GAP SERPL CALCULATED.3IONS-SCNC: 16 MMOL/L (ref 9–17)
AST SERPL-CCNC: 13 U/L
BILIRUB SERPL-MCNC: 0.6 MG/DL (ref 0.3–1.2)
BUN BLDV-MCNC: 36 MG/DL (ref 8–23)
CALCIUM SERPL-MCNC: 9.5 MG/DL (ref 8.6–10.4)
CHLORIDE BLD-SCNC: 103 MMOL/L (ref 98–107)
CO2: 21 MMOL/L (ref 20–31)
CREAT SERPL-MCNC: 2.47 MG/DL (ref 0.5–0.9)
CREATININE URINE: 93.8 MG/DL (ref 28–217)
GFR SERPL CREATININE-BSD FRML MDRD: 21 ML/MIN/1.73M2
GLUCOSE BLD-MCNC: 134 MG/DL (ref 70–99)
MAGNESIUM: 2.2 MG/DL (ref 1.6–2.6)
PHOSPHORUS: 2.9 MG/DL (ref 2.6–4.5)
POTASSIUM SERPL-SCNC: 4.3 MMOL/L (ref 3.7–5.3)
PTH INTACT: 161.2 PG/ML (ref 14–72)
SODIUM BLD-SCNC: 140 MMOL/L (ref 135–144)
TOTAL PROTEIN, URINE: 222 MG/DL
TOTAL PROTEIN: 7 G/DL (ref 6.4–8.3)
URIC ACID: 10.3 MG/DL (ref 2.4–5.7)

## 2022-12-01 PROCEDURE — 97110 THERAPEUTIC EXERCISES: CPT

## 2022-12-01 NOTE — FLOWSHEET NOTE
[x] [facility]) - [facility] & Therapy  [address]  [place]: [phone]   F: [phone]    Physical Therapy Daily Treatment Note      Date:  2022  Patient Name:  Jessica Garcia    :  1960  MRN: [id number]  Physician: Paulino Hernandez                                   Insurance: [employer], then Trinda Likes  Medical Diagnosis: back pain M54.41, M54.42 Add LE weakness  R53.1  Rehab Codes: M54.5;  R53.1  Onset Date: referral 10/17/22                       Next 's appt.: PRN  Visit Count:                                 Cancel/No Show: 1/0    Subjective:  Patient reporting to therapy stating she is very tired and weak today especially in the legs. Patient reporting that today has been especially difficult   Pain:  [] Yes  [x] No Location:  N/A Pain Rating: (0-10 scale) denies/10  Pain altered Tx:  [] No  [] Yes  Action:  Comments:  Vitals taken after walking in and before starting on NuStep- 74% and 76BPM   With proper breathing technique was able to improve to 94% and 80BPM  End of session- 93% 81BPM      Objective:  Modalities:   Precautions:  Exercises: Mat level exercises performed with HOB elevated  Exercise Reps/ Time Weight/ Level Completed Comments   NuStep 5 2 X 87% 83BPM-- pt was able to maintain between 64-68SPM          Mat       LTR 10x 5sec  X    Piriformis stretch 3x 30sec  X 83% 81BPM   SLR R/L 8x/10x  X To pt's tolerance   Abdominal bracing 10x 5sec  X    Marching  with abdominal bracing 10x2  X                         Postural education      Sitting posture, support   Thoracic rotation seated with wand 10x 5\" (B)        Thoracic extension seated with wand 10 x 5\"        Scapular retraction seated 10x                   Other:    Specific Instructions for next treatment:      Assessment: [] Progressing toward goals. [] No change.      [x] Other: Patient became very SOB and vitals were taken upon sitting on NuStep and continued to monitor vitals throughout session. Patient states she has not used/needed oxygen supplement for a couple months but had difficulty recovering after walking in from her car and returning to her car. Began on Nustep and followed with core and hip strengthening with HOB elevated. Patient continues to demonstrate weakness on LLE secondary to a stroke she had affecting her L side. Educated patient on proper breathing technique to inc O2 saturation and also educated on proper breathing to prevent valsalva maneuver. Patient demonstrating decreased endurance and fatiguing quickly with activity. Also educated patient on self pacing to be able to inc tolerance to activity. [x] Patient would continue to benefit from skilled physical therapy services in order to: work on generalized LE strength and function and decreased back pain while improving mobility at the thoracic spine. STG: (to be met in 8 treatments)  ? Pain: 0-1/10 to allow globally improved function  ? ROM: 45 degrees (B) rotation to allow improved turning while in bed  ? Strength: 4+/5 (R) sided strength to allow full functional tolerance of stairs, sit to stand  ? Function: improved balance with clinical testing to allow full gait without risk of falls, able to do 6\" stairs in the clinic, ambulates from parking lot to lobby with minimal to no difficulty and less use of walker grossly with decreased UE (A) grossly  Independent with Home Exercise Programs  Demonstrate Knowledge of fall prevention  LTG: (to be met in 12 treatments)  ? Pain: 0/10 to allow globally improved function  ? ROM: 45 degrees (B) rotation to allow improved turning while in bed  ? Strength: 4+/5 (R) sided strength to allow full functional tolerance of stairs, sit to stand  ?  Function: improved balance with clinical testing to allow full gait without risk of falls, able to do 8\" stairs in the clinic to allow full tolerance of stairs while visiting sister; ambulates for short distances without use

## 2022-12-02 LAB — VITAMIN D 25-HYDROXY: 15 NG/ML

## 2022-12-05 ENCOUNTER — HOSPITAL ENCOUNTER (OUTPATIENT)
Dept: PHYSICAL THERAPY | Age: 62
Setting detail: THERAPIES SERIES
Discharge: HOME | End: 2022-12-05
Payer: COMMERCIAL

## 2022-12-05 NOTE — FLOWSHEET NOTE
[x] [facility]) - [facility] & Therapy  [address]  [place]: [phone]   F: [phone]    Physical Therapy CXL      Date:  2022  Patient Name:  Milton Madrigal    :  1960  MRN: [id number]  Physician: Everett Alanis                                   Insurance: [employer] Zeeshan gomez, cande Tucker  Medical Diagnosis: back pain M54.41, M54.42 Add LE weakness  R53.1  Rehab Codes: M54.5;  R53.1  Onset Date: referral 10/17/22                       Next 's appt.: PRN  Visit Count:                                 Cancel/No Show: 2/0    Patient cancelled appointment today- difficulty breathing. Stated she would be here on scheduled appointment Wednesday.       Electronically signed by:  Goldy Kincaid PT

## 2022-12-07 ENCOUNTER — HOSPITAL ENCOUNTER (OUTPATIENT)
Dept: PHYSICAL THERAPY | Age: 62
Setting detail: THERAPIES SERIES
Discharge: HOME | End: 2022-12-07
Payer: COMMERCIAL

## 2022-12-07 NOTE — FLOWSHEET NOTE
[x] [facility]) - [facility] & Therapy  [address]  [place]: [phone]   F: [phone]    Physical Therapy CXL      Date:  2022  Patient Name:  Meri Munguia    :  1960  MRN: [id number]  Physician: Oziel Gates                                   Insurance: [employer], [facility] [employer]  Medical Diagnosis: back pain M54.41, M54.42 Add LE weakness  R53.1  Rehab Codes: M54.5;  R53.1  Onset Date: referral 10/17/22                       Next 's appt.: PRN  Visit Count:                                 Cancel/No Show: 3/0    Patient still not feeling well- rescheduled to next week.       Electronically signed by:  Jelani Perez PT

## 2022-12-14 ENCOUNTER — HOSPITAL ENCOUNTER (OUTPATIENT)
Age: 62
Setting detail: SPECIMEN
Discharge: HOME | End: 2022-12-14

## 2022-12-14 ENCOUNTER — APPOINTMENT (RX ONLY)
Dept: URBAN - METROPOLITAN AREA CLINIC 77 | Facility: CLINIC | Age: 62
Setting detail: DERMATOLOGY
End: 2022-12-14

## 2022-12-14 ENCOUNTER — HOSPITAL ENCOUNTER (OUTPATIENT)
Dept: PHYSICAL THERAPY | Age: 62
Setting detail: THERAPIES SERIES
Discharge: HOME | End: 2022-12-14
Payer: COMMERCIAL

## 2022-12-14 DIAGNOSIS — L23.9 ALLERGIC CONTACT DERMATITIS, UNSPECIFIED CAUSE: ICD-10-CM | Status: INADEQUATELY CONTROLLED

## 2022-12-14 DIAGNOSIS — N30.00 ACUTE CYSTITIS WITHOUT HEMATURIA: ICD-10-CM

## 2022-12-14 PROCEDURE — ? COUNSELING

## 2022-12-14 PROCEDURE — 97110 THERAPEUTIC EXERCISES: CPT

## 2022-12-14 PROCEDURE — 99203 OFFICE O/P NEW LOW 30 MIN: CPT

## 2022-12-14 PROCEDURE — ? PRESCRIPTION

## 2022-12-14 PROCEDURE — 97530 THERAPEUTIC ACTIVITIES: CPT

## 2022-12-14 PROCEDURE — ? PRESCRIPTION MEDICATION MANAGEMENT

## 2022-12-14 RX ORDER — HYDROCORTISONE 25 MG/G
CREAM TOPICAL BID
Qty: 30 | Refills: 1 | Status: ERX | COMMUNITY
Start: 2022-12-14

## 2022-12-14 RX ORDER — TRIAMCINOLONE ACETONIDE 1 MG/G
CREAM TOPICAL BID
Qty: 453.6 | Refills: 1 | Status: ERX | COMMUNITY
Start: 2022-12-14

## 2022-12-14 RX ADMIN — TRIAMCINOLONE ACETONIDE: 1 CREAM TOPICAL at 00:00

## 2022-12-14 RX ADMIN — HYDROCORTISONE: 25 CREAM TOPICAL at 00:00

## 2022-12-14 ASSESSMENT — LOCATION SIMPLE DESCRIPTION DERM
LOCATION SIMPLE: LEFT FOREHEAD
LOCATION SIMPLE: RIGHT FOREARM
LOCATION SIMPLE: LEFT CHEEK
LOCATION SIMPLE: RIGHT HAND
LOCATION SIMPLE: RIGHT CHEEK
LOCATION SIMPLE: LEFT LIP
LOCATION SIMPLE: LEFT FOREARM
LOCATION SIMPLE: LEFT HAND

## 2022-12-14 ASSESSMENT — LOCATION ZONE DERM
LOCATION ZONE: FACE
LOCATION ZONE: LIP
LOCATION ZONE: ARM
LOCATION ZONE: HAND

## 2022-12-14 ASSESSMENT — LOCATION DETAILED DESCRIPTION DERM
LOCATION DETAILED: RIGHT RADIAL DORSAL HAND
LOCATION DETAILED: LEFT DISTAL DORSAL FOREARM
LOCATION DETAILED: LEFT MEDIAL FOREHEAD
LOCATION DETAILED: RIGHT PROXIMAL DORSAL FOREARM
LOCATION DETAILED: RIGHT SUPERIOR MEDIAL BUCCAL CHEEK
LOCATION DETAILED: LEFT LOWER CUTANEOUS LIP
LOCATION DETAILED: LEFT INFERIOR CENTRAL MALAR CHEEK
LOCATION DETAILED: LEFT RADIAL DORSAL HAND

## 2022-12-14 NOTE — PROCEDURE: PRESCRIPTION MEDICATION MANAGEMENT
Plan: Recommend Cetaphil moisturizing cream and hydrating cleanser and Aquaphor to lips.
Render In Strict Bullet Format?: No
Detail Level: Zone
Initiate Treatment: Hydrocortisone cream BID on face not eyelids, TAC cream BID on arms and hands, Opzelura BID to eyelids (samples provided)

## 2022-12-14 NOTE — FLOWSHEET NOTE
[x] [facility] ([facility]) - [facility] & Therapy  [address]  [place]: [phone]   F: [phone]    Physical Therapy Daily Treatment Note      Date:  2022  Patient Name:  Tara Boo    :  1960  MRN: [id number]  Physician: Bill Mclaughlin                                   Insurance: Colleen gomez, then Sam Palacios  Medical Diagnosis: back pain M54.41, M54.42 Add LE weakness  R53.1  Rehab Codes: M54.5;  R53.1  Onset Date: referral 10/17/22                       Next 's appt.: PRN  Visit Count: 3/12                                Cancel/No Show: 3/0    Subjective:  Patient reporting to therapy stating she saw her kidney doctor and they want to the patient to try to not use     Pain:  [] Yes  [x] No Location:  N/A Pain Rating: (0-10 scale) denies/10  Pain altered Tx:  [] No  [] Yes  Action:  Comments:  Vitals in lobby- 83% 140/69mmHg 76BPM   Walking into clinic and upon sitting- 77% inc 94%       Objective:  Modalities:   Precautions: BP to be taken in RUE ONLY  Exercises: Mat level exercises performed with HOB elevated  Exercise Reps/ Time Weight/ Level Completed Comments   NuStep 5 2            Mat       LTR 10x 5sec      Piriformis stretch 3x 30sec      SLR R/L 8x/10x   To pt's tolerance   Abdominal bracing 10x 5sec      Marching  with abdominal bracing 10x2             Seated       Postural education      Sitting posture, support   Thoracic rotation seated with wand 10x 5\" (B)   X  93%   Thoracic extension seated over foam roll 10 x 5\"   X 12/14 94%   Scapular retraction seated 10x2 5\"   X      trunk side bend 3x 15\"   X  93%   Hip ABD 10x2 3\" green X 95%   LAQ  10x2 green X 93%                        Other:  TA- 10' discussed with patient and primary PT about POC due to patient's O2 saturation dropping below 80% walking short distances.   Vitals at the end of session-169/90mmHg 69BPM 91%    Specific Instructions for next treatment:      Assessment: [] Progressing toward goals.    [] No change. [x] Other: Patient continues to be limited due to shortness of breath with all activity. Consulted with primary and discussed with patient about brining her O2 portable concentrator for safety to be able to increase pt's tolerance to activity. Primarily focused on seated exercises per patient's activity tolerance. Pt performed seated BLE strengthening exercises with focus on transverse abdominal engagement throughout exercises to inc core strength. Cues provided throughout session to correct pt's breathing technique. Patient was able to maintain ~93% O2 sat throughout seated exercises. Patient was wheeled out to her car in a wheelchair for her safety. [x] Patient would continue to benefit from skilled physical therapy services in order to: work on generalized LE strength and function and decreased back pain while improving mobility at the thoracic spine. STG: (to be met in 8 treatments)  ? Pain: 0-1/10 to allow globally improved function  ? ROM: 45 degrees (B) rotation to allow improved turning while in bed  ? Strength: 4+/5 (R) sided strength to allow full functional tolerance of stairs, sit to stand  ? Function: improved balance with clinical testing to allow full gait without risk of falls, able to do 6\" stairs in the clinic, ambulates from parking lot to lobby with minimal to no difficulty and less use of walker grossly with decreased UE (A) grossly  Independent with Home Exercise Programs  Demonstrate Knowledge of fall prevention  LTG: (to be met in 12 treatments)  ? Pain: 0/10 to allow globally improved function  ? ROM: 45 degrees (B) rotation to allow improved turning while in bed  ? Strength: 4+/5 (R) sided strength to allow full functional tolerance of stairs, sit to stand  ?  Function: improved balance with clinical testing to allow full gait without risk of falls, able to do 8\" stairs in the clinic to allow full tolerance of stairs while visiting sister; ambulates for short distances without use of (A) device   Independent with Home Exercise Programs  Demonstrate Knowledge of fall prevention        Patient goals:getting strength    Pt. Education:  [x] Yes  [] No  [] Reviewed Prior HEP/Ed  Method of Education: [x] Verbal  [] Demo  [] Written  Comprehension of Education:  [] Verbalizes understanding. [] Demonstrates understanding. [x] Needs review. [] Demonstrates/verbalizes HEP/Ed previously given. Plan: [x] Continue per plan of care.    [] Other:      Treatment Charges: Mins Units   []  Modalities     [x]  Ther Exercise 45 3   []  Manual Therapy     [x]  Ther Activities 10 1   []  Aquatics     []  Neuromuscular     [] Vasocompression     [] Gait Training     [] Dry needling        [] 1 or 2 muscles        [] 3 or more muscles     []  Other     Total Treatment time 55 4     Time RK:[id number]            Time Out: 3861    Electronically signed by:  Lissa Ragsdale PTA

## 2022-12-16 LAB
CULTURE: ABNORMAL
SPECIMEN DESCRIPTION: ABNORMAL

## 2023-01-10 ENCOUNTER — HOSPITAL ENCOUNTER (OUTPATIENT)
Age: 63
Setting detail: SPECIMEN
Discharge: HOME | End: 2023-01-10

## 2023-01-10 LAB
ALBUMIN SERPL-MCNC: 3 G/DL (ref 3.5–5.2)
ANION GAP SERPL CALCULATED.3IONS-SCNC: 17 MMOL/L (ref 9–17)
BACTERIA: ABNORMAL
BILIRUBIN URINE: NEGATIVE
BUN BLDV-MCNC: 30 MG/DL (ref 8–23)
CALCIUM SERPL-MCNC: 8.8 MG/DL (ref 8.6–10.4)
CASTS UA: ABNORMAL /LPF (ref 0–2)
CASTS UA: ABNORMAL /LPF (ref 0–2)
CHLORIDE BLD-SCNC: 107 MMOL/L (ref 98–107)
CO2: 17 MMOL/L (ref 20–31)
COLOR: YELLOW
CREAT SERPL-MCNC: 2.26 MG/DL (ref 0.5–0.9)
CREATININE URINE: 103.9 MG/DL (ref 28–217)
EPITHELIAL CELLS UA: ABNORMAL /HPF (ref 0–5)
GFR SERPL CREATININE-BSD FRML MDRD: 24 ML/MIN/1.73M2
GLUCOSE BLD-MCNC: 250 MG/DL (ref 70–99)
GLUCOSE URINE: NEGATIVE
HCT VFR BLD CALC: 44.2 % (ref 36.3–47.1)
HEMOGLOBIN: 13.3 G/DL (ref 11.9–15.1)
KETONES, URINE: NEGATIVE
LEUKOCYTE ESTERASE, URINE: ABNORMAL
MCH RBC QN AUTO: 30.7 PG (ref 25.2–33.5)
MCHC RBC AUTO-ENTMCNC: 30.1 G/DL (ref 28.4–34.8)
MCV RBC AUTO: 102.1 FL (ref 82.6–102.9)
NITRITE, URINE: NEGATIVE
NRBC AUTOMATED: 0 PER 100 WBC
PDW BLD-RTO: 16.6 % (ref 11.8–14.4)
PH UA: 7 (ref 5–8)
PHOSPHORUS: 3.4 MG/DL (ref 2.6–4.5)
PLATELET # BLD: 180 K/UL (ref 138–453)
PMV BLD AUTO: 11 FL (ref 8.1–13.5)
POTASSIUM SERPL-SCNC: 5.1 MMOL/L (ref 3.7–5.3)
PROTEIN UA: ABNORMAL
PTH INTACT: 214.5 PG/ML (ref 14–72)
RBC # BLD: 4.33 M/UL (ref 3.95–5.11)
RBC UA: ABNORMAL /HPF (ref 0–2)
SODIUM BLD-SCNC: 141 MMOL/L (ref 135–144)
SPECIFIC GRAVITY UA: 1.02 (ref 1–1.03)
TOTAL PROTEIN, URINE: 540 MG/DL
TURBIDITY: CLEAR
URIC ACID: 7.1 MG/DL (ref 2.4–5.7)
URINE HGB: NEGATIVE
UROBILINOGEN, URINE: NORMAL
VITAMIN D 25-HYDROXY: 14.2 NG/ML
WBC # BLD: 7.9 K/UL (ref 3.5–11.3)
WBC UA: ABNORMAL /HPF (ref 0–5)

## 2023-01-11 LAB
CULTURE: NORMAL
SPECIMEN DESCRIPTION: NORMAL

## 2023-01-18 ENCOUNTER — OFFICE VISIT (OUTPATIENT)
Dept: NEUROLOGY | Age: 63
End: 2023-01-18
Payer: COMMERCIAL

## 2023-01-18 VITALS
WEIGHT: 250.4 LBS | SYSTOLIC BLOOD PRESSURE: 170 MMHG | DIASTOLIC BLOOD PRESSURE: 80 MMHG | BODY MASS INDEX: 41.72 KG/M2 | HEIGHT: 65 IN | HEART RATE: 92 BPM

## 2023-01-18 DIAGNOSIS — E08.42 DIABETIC POLYNEUROPATHY ASSOCIATED WITH DIABETES MELLITUS DUE TO UNDERLYING CONDITION (HCC): ICD-10-CM

## 2023-01-18 DIAGNOSIS — E66.01 OBESITY, CLASS III, BMI 40-49.9 (MORBID OBESITY) (HCC): ICD-10-CM

## 2023-01-18 DIAGNOSIS — F09 COGNITIVE DISORDER: ICD-10-CM

## 2023-01-18 DIAGNOSIS — G81.91 RIGHT HEMIPARESIS (HCC): ICD-10-CM

## 2023-01-18 DIAGNOSIS — E87.8 DISEQUILIBRIUM SYNDROME: Primary | ICD-10-CM

## 2023-01-18 PROCEDURE — 3078F DIAST BP <80 MM HG: CPT | Performed by: PSYCHIATRY & NEUROLOGY

## 2023-01-18 PROCEDURE — G8427 DOCREV CUR MEDS BY ELIG CLIN: HCPCS | Performed by: PSYCHIATRY & NEUROLOGY

## 2023-01-18 PROCEDURE — 99214 OFFICE O/P EST MOD 30 MIN: CPT | Performed by: PSYCHIATRY & NEUROLOGY

## 2023-01-18 PROCEDURE — 1036F TOBACCO NON-USER: CPT | Performed by: PSYCHIATRY & NEUROLOGY

## 2023-01-18 PROCEDURE — 3017F COLORECTAL CA SCREEN DOC REV: CPT | Performed by: PSYCHIATRY & NEUROLOGY

## 2023-01-18 PROCEDURE — G8417 CALC BMI ABV UP PARAM F/U: HCPCS | Performed by: PSYCHIATRY & NEUROLOGY

## 2023-01-18 PROCEDURE — 3077F SYST BP >= 140 MM HG: CPT | Performed by: PSYCHIATRY & NEUROLOGY

## 2023-01-18 PROCEDURE — G8484 FLU IMMUNIZE NO ADMIN: HCPCS | Performed by: PSYCHIATRY & NEUROLOGY

## 2023-01-18 PROCEDURE — 2022F DILAT RTA XM EVC RTNOPTHY: CPT | Performed by: PSYCHIATRY & NEUROLOGY

## 2023-01-18 ASSESSMENT — ENCOUNTER SYMPTOMS
RESPIRATORY NEGATIVE: 1
ALLERGIC/IMMUNOLOGIC NEGATIVE: 1
GASTROINTESTINAL NEGATIVE: 1
EYES NEGATIVE: 1

## 2023-01-18 NOTE — PROGRESS NOTES
Active Problem Patient has had dysequilibrium syndrome with improvement in ability to process new memory . She was seen on consultation at [facility] in August for syncope . ESRD with hemodialysis . The comndition is she reports that memory has improved with short term with some trouble with some long term memory complaints . If she has discussion he may have trouble remembering things over one month . Her sister reports past events of many year ago such as prior wedding of niece she has no recall . Short term memory is  better. She is not undergoing hemodialysis any more creatinine 2.4 with more confusional episodes. There is some baseline imbalance on her feet using walker . Day of admission in August 2012 during the end of hemodialysis she became unresponsive with initial concern that she was asleep although found that she could not be aroused . On coming to she was confused disoriented unable to remember what was going on sent to [facility] ER . Head CT mild chronic small vessel disease . She had had similar episodes of unresponsiveness during hemodialysis since September about ten episodes altogether associated with hypotension with no seizure activity awakening from spell confused disoriented amnestic with spells being of variable duration lasting up to 4 weeks . In between sister says she was foggy headed with no clear memory complaints . There has been numerous changes made with dialysis exchange with her continuing episodes of unresponsiveness the one prior to yesterday being in June . She denies headache or focal motor, sensory,  bulbar or visual complaints  . Head CT mild chronic small vessel disease  . She was at [facility] on June 26 with confusion along with right side numbness . MRI of Head mild chronic periventricular small vessel ischemia . EEG mild diffuse slowing . She does have history of recurrent UTI. She is on aspirin 81 mg po qd and lipitor 80 mg po qd .  The condition is she has been off hemodialysis altogether since August 2 seeing nephrologist Dr Janett Ramirez . Creatinine has improved 2.12 . Her leg and body strength are doing better able to stamina using walker for support gettinng wined with weak with prolongued distances . There is improvement in cognition able to remember conversations . She lives with her mother in [place] . Her sister and patient relate that she is able to remember and process new information although still has problem with older memory unable to remember what happens in some holidays or unable to recognize the house where she was born. In hospital MRI of Head chronic periventricular small vessel ischemia . Chronic sinusitis more right maxillary . EEG mild intermittent generalized slowing  . O90 371, folic acid 9.8 , TSH normal , RUSSELL negatuve.  There is numbness of bot feet with neuropathy having diabetes      Past Medical History:   Diagnosis Date    Arthritis     Backache, unspecified     Cerebral artery occlusion with cerebral infarction (HCC)     CHF (congestive heart failure) (HCC)     Chronic kidney disease     Coronary atherosclerosis of artery bypass graft     COVID 1/31/2022    Cramp of limb     Gallstones     Hemodialysis patient ([facility] [address]     Hyperlipidemia     Hypertension     Insomnia     Neuromuscular disorder ([facility] [address]     Pneumonia     Psychiatric problem     Thyroid disease     Type II or unspecified type diabetes mellitus with renal manifestations, not stated as uncontrolled(250.40)     Type II or unspecified type diabetes mellitus without mention of complication, not stated as uncontrolled     Unspecified vitamin D deficiency        Past Surgical History:   Procedure Laterality Date    ANKLE FRACTURE SURGERY      AV FISTULA CREATION  12/14/2021    BACK SURGERY      BREAST SURGERY      CARDIAC SURGERY      CARPAL TUNNEL RELEASE      CHOLECYSTECTOMY, OPEN N/A     COLONOSCOPY      CORONARY ARTERY BYPASS GRAFT      x3    DIALYSIS FISTULA CREATION Left 12/14/2021    LEFT AV FISTULA CREATION UPPER EXTREMITY performed by Shantel Wu MD at [facility]      IR TUNNELED CATHETER PLACEMENT GREATER THAN 5 YEARS  8/18/2021    IR TUNNELED CATHETER PLACEMENT GREATER THAN 5 YEARS 8/18/2021 [facility] SPECIAL PROCEDURES    KNEE ARTHROSCOPY      right    OTHER SURGICAL HISTORY  04/06/2022    cvc exchange    TONSILLECTOMY         Family History   Problem Relation Age of Onset    Diabetes Father     Heart Failure Father        Social History     Socioeconomic History    Marital status: Single     Spouse name: None    Number of children: None    Years of education: None    Highest education level: None   Tobacco Use    Smoking status: Never    Smokeless tobacco: Never   Vaping Use    Vaping Use: Never used   Substance and Sexual Activity    Alcohol use: No    Drug use: No    Sexual activity: Not Currently     Social Determinants of Health     Financial Resource Strain: Low Risk     Difficulty of Paying Living Expenses: Not very hard   Food Insecurity: No Food Insecurity    Worried About Running Out of Food in the Last Year: Never true    Ran Out of Food in the Last Year: Never true   Transportation Needs: No Transportation Needs    Lack of Transportation (Medical): No    Lack of Transportation (Non-Medical): No   Physical Activity: Inactive    Days of Exercise per Week: 0 days    Minutes of Exercise per Session: 0 min   Stress: Stress Concern Present    Feeling of Stress :  To some extent   Social Connections: Socially Isolated    Frequency of Communication with Friends and Family: More than three times a week    Frequency of Social Gatherings with Friends and Family: More than three times a week    Attends [employer] Services: Never    Active Member of Clubs or Organizations: No    Attends Club or Organization Meetings: Never    Marital Status: Never    Housing Stability: [address] to Pay for Housing in the Last Year: No    Number of Places Lived in the Last Year: 1    Unstable Housing in the Last Year: No       Current Outpatient Medications   Medication Sig Dispense Refill    sodium bicarbonate 650 MG tablet Take 2 tablets by mouth 2 times daily (Patient taking differently: Take 1,300 mg by mouth 2 times daily Two tab BID) 360 tablet 3    insulin glargine (BASAGLAR KWIKPEN) 100 UNIT/ML injection pen Inject 55 Units into the skin 2 times daily 7 Adjustable Dose Pre-filled Pen Syringe 5    allopurinol (ZYLOPRIM) 100 MG tablet Take 1 tablet by mouth daily 30 tablet 5    furosemide (LASIX) 80 MG tablet Take 1.5 tablets by mouth 2 times daily (Patient taking differently: Take 120 mg by mouth 2 times daily Two tabs BID) 270 tablet 3    pantoprazole (PROTONIX) 40 MG tablet TAKE 1 TABLET BY MOUTH ONCE DAILY IN THE MORNING BEFORE BREAKFAST 30 tablet 5    atorvastatin (LIPITOR) 10 MG tablet Take 1 tablet by mouth once daily 30 tablet 5    ranolazine (RANEXA) 1000 MG extended release tablet TAKE 1  BY MOUTH TWICE DAILY 60 tablet 5    tamsulosin (FLOMAX) 0.4 MG capsule Take 1 capsule by mouth daily 30 capsule 3    Insulin Pen Needle (EASY TOUCH PEN NEEDLES) 29G X 12MM MISC 5 each by Does not apply route daily 150 each 5    Blood Glucose Monitoring Suppl (TRUE METRIX GO GLUCOSE METER) w/Device KIT 1 each by Does not apply route 4 times daily 1 kit 1    blood glucose monitor strips Test 3 times a day & as needed for symptoms of irregular blood glucose. Dispense sufficient amount for indicated testing frequency plus additional to accommodate PRN testing needs. 100 strip 11    AZO-CRANBERRY PO Take 2 capsules by mouth daily      Calcium Polycarbophil (FIBER-CAPS PO) Take 2 capsules by mouth in the morning and at bedtime      Lactobacillus (PROBIOTIC ACIDOPHILUS PO) Take by mouth daily      gabapentin (NEURONTIN) 300 MG capsule Take 300 mg by mouth in the morning.       acetaminophen (TYLENOL) 325 MG tablet Take 650 mg by mouth every 6 hours as needed for Pain loperamide (IMODIUM) 2 MG capsule Take 2 mg by mouth 2 times daily as needed for Diarrhea      carvedilol (COREG) 3.125 MG tablet Take 1 tablet by mouth 2 times daily 60 tablet 11    melatonin 10 MG CAPS capsule Take 10 mg by mouth nightly      ReliOn Lancets Micro-Thin 33G MISC USE AS DIRECTED EVERY DAY      HUMALOG 100 UNIT/ML injection vial Inject into the skin 3 times daily (before meals) Indications: 15 units and sliding scale (patient reports only the sliding scale)       Lancets MISC 1 each by Does not apply route daily 100 each 11    docusate sodium (COLACE) 100 MG capsule Take 1 capsule by mouth 2 times daily 60 capsule 0    aspirin 81 MG chewable tablet Take 1 tablet by mouth daily 30 tablet 0     No current facility-administered medications for this visit. Allergies   Allergen Reactions    Adhesive Tape Other (See Comments) and Rash     Other reaction(s): Unknown    Metformin And Related Diarrhea    Ace Inhibitors Other (See Comments)     Cough, states not good for her kidneys    Iv Dye [Iodides]      Stage 4 kidney disease    Nsaids      CANNOT TAKE D/T KIDNEY DISEASE    Metformin And Related Hives, Itching and Rash     Stage 4 kidney disease. Review of Systems     Vitals:    01/18/23 1418   BP: (!) 170/80   Pulse: 92     weight: 250 lb 6.4 oz (113.6 kg)      Review of Systems   Constitutional: Negative. HENT: Negative. Eyes: Negative. Respiratory: Negative. Cardiovascular: Negative. Gastrointestinal: Negative. Endocrine: Negative. Genitourinary: Negative. Musculoskeletal: Negative. Allergic/Immunologic: Negative. Neurological: Negative. Hematological: Negative. Psychiatric/Behavioral: Negative. Neurological Examination  Constitutional .General exam well groomed   Head/Ears /Nose/Throat: external ear . Normal exam  Neck and thyroid . Normal size. No bruits  Respiratory . Breathsounds clear bilaterally  Cardiovascular:  Auscultation of heart with regular rate and rhythm  Musculoskeletal. Muscle bulk and tone normal                                                           Muscle strength 5/5 strength throughout                                                                                No dysmetria or dysdiadokinesis  No tremor   Normal fine motor  Gait mild imbalance   Orientation Alert and oriented x 3 . Wednesday January 16. 2023 . President Lynda Bragg.  a woman . WORLD able to spell forwards and backwards . Serial 7 to 886  Attention and concentration normal  Short term memory 3 words out of 3 in one minute   Language process and speech normal . No aphasia   Cranial nerve 2 normal acuety and visual fields  Cranial nerve 3, 4 and 6 . Extraocular muscles are intact . Pupils are equal and reactive   Cranial nerve 5 . Intact corneal reflex. Normal facial sensation  Cranial nerve 7 normal exam   Cranial nerve 8. Grossly intact hearing   Cranial nerve 9 and 10. Symmetric palate elevation   Cranial nerve 11 , 5 out of 5 strength   Cranial Nerve 12 midline tongue . No atrophy  Sensation . Decreased pinprick and light touch stocking level  Deep Tendon Reflexes absent ankle levels   Plantar response flexor bilaterally      ASSESSMENT/PLAN      Diagnosis Orders   1. Disequilibrium syndrome        2. Right hemiparesis (HCC)        3. Obesity, Class III, BMI 40-49.9 (morbid obesity) ([address]        4. Cognitive disorder        5.  Diabetic polyneuropathy associated with diabetes mellitus due to underlying condition [facility])        Patient has had dysequilibrium syndrome with improvement in establishing memory now off hemodialysis       As above

## 2023-01-19 PROBLEM — L97.521 ULCER OF LEFT FOOT, LIMITED TO BREAKDOWN OF SKIN (HCC): Status: ACTIVE | Noted: 2023-01-19

## 2023-01-19 PROBLEM — N25.81 SECONDARY HYPERPARATHYROIDISM (HCC): Status: ACTIVE | Noted: 2023-01-19

## 2023-01-24 ENCOUNTER — HOSPITAL ENCOUNTER (OUTPATIENT)
Dept: GENERAL RADIOLOGY | Facility: CLINIC | Age: 63
Discharge: HOME | End: 2023-01-26
Payer: COMMERCIAL

## 2023-01-24 ENCOUNTER — HOSPITAL ENCOUNTER (OUTPATIENT)
Facility: CLINIC | Age: 63
Discharge: HOME | End: 2023-01-26
Payer: COMMERCIAL

## 2023-01-24 DIAGNOSIS — R09.02 HYPOXEMIA: ICD-10-CM

## 2023-01-24 DIAGNOSIS — R06.02 SHORTNESS OF BREATH: ICD-10-CM

## 2023-01-24 DIAGNOSIS — R07.89 CHEST PRESSURE: ICD-10-CM

## 2023-01-24 PROCEDURE — 71046 X-RAY EXAM CHEST 2 VIEWS: CPT

## 2023-01-25 ENCOUNTER — APPOINTMENT (OUTPATIENT)
Dept: GENERAL RADIOLOGY | Age: 63
DRG: 291 | End: 2023-01-25
Payer: COMMERCIAL

## 2023-01-25 ENCOUNTER — HOSPITAL ENCOUNTER (INPATIENT)
Age: 63
LOS: 12 days | Discharge: NURSING, REHAB OR LONG-TERM CARE | DRG: 291 | End: 2023-02-06
Attending: EMERGENCY MEDICINE | Admitting: STUDENT IN AN ORGANIZED HEALTH CARE EDUCATION/TRAINING PROGRAM
Payer: COMMERCIAL

## 2023-01-25 DIAGNOSIS — R09.02 HYPOXIA: ICD-10-CM

## 2023-01-25 DIAGNOSIS — I50.9 CONGESTIVE HEART FAILURE, UNSPECIFIED HF CHRONICITY, UNSPECIFIED HEART FAILURE TYPE (HCC): Primary | ICD-10-CM

## 2023-01-25 DIAGNOSIS — N17.9 ACUTE KIDNEY INJURY SUPERIMPOSED ON CKD (HCC): ICD-10-CM

## 2023-01-25 DIAGNOSIS — N18.9 ACUTE KIDNEY INJURY SUPERIMPOSED ON CKD (HCC): ICD-10-CM

## 2023-01-25 PROBLEM — R06.03 ACUTE RESPIRATORY DISTRESS: Status: ACTIVE | Noted: 2023-01-25

## 2023-01-25 PROBLEM — I50.33 ACUTE ON CHRONIC DIASTOLIC HEART FAILURE (HCC): Status: ACTIVE | Noted: 2018-09-20

## 2023-01-25 PROBLEM — I16.0 HYPERTENSIVE URGENCY: Status: ACTIVE | Noted: 2023-01-25

## 2023-01-25 LAB
ANION GAP SERPL CALCULATED.3IONS-SCNC: 11 MMOL/L (ref 9–17)
BUN BLDV-MCNC: 31 MG/DL (ref 8–23)
CALCIUM SERPL-MCNC: 8.9 MG/DL (ref 8.6–10.4)
CARBOXYHEMOGLOBIN: 3.6 % (ref 0–5)
CHLORIDE BLD-SCNC: 108 MMOL/L (ref 98–107)
CHP ED QC CHECK: YES
CO2: 22 MMOL/L (ref 20–31)
CREAT SERPL-MCNC: 2.12 MG/DL (ref 0.5–0.9)
FIO2: ABNORMAL
GFR SERPL CREATININE-BSD FRML MDRD: 26 ML/MIN/1.73M2
GLUCOSE BLD-MCNC: 102 MG/DL (ref 70–99)
GLUCOSE BLD-MCNC: 82 MG/DL
GLUCOSE BLD-MCNC: 84 MG/DL (ref 65–105)
GLUCOSE BLD-MCNC: 94 MG/DL
GLUCOSE BLD-MCNC: 94 MG/DL (ref 65–105)
HCO3 VENOUS: 22.4 MMOL/L (ref 24–30)
HCT VFR BLD CALC: 41.2 % (ref 36.3–47.1)
HEMOGLOBIN: 12.9 G/DL (ref 11.9–15.1)
INR BLD: 1.1
MCH RBC QN AUTO: 31.2 PG (ref 25.2–33.5)
MCHC RBC AUTO-ENTMCNC: 31.3 G/DL (ref 28.4–34.8)
MCV RBC AUTO: 99.5 FL (ref 82.6–102.9)
NEGATIVE BASE EXCESS, VEN: 1.3 MMOL/L (ref 0–2)
NRBC AUTOMATED: 0 PER 100 WBC
O2 SAT, VEN: 91.1 % (ref 60–85)
PATIENT TEMP: 37
PCO2, VEN: 36.2 MM HG (ref 39–55)
PDW BLD-RTO: 16.9 % (ref 11.8–14.4)
PH VENOUS: 7.41 (ref 7.32–7.42)
PLATELET # BLD: 186 K/UL (ref 138–453)
PMV BLD AUTO: 10.2 FL (ref 8.1–13.5)
PO2, VEN: 58.8 MM HG (ref 30–50)
POTASSIUM SERPL-SCNC: 4 MMOL/L (ref 3.7–5.3)
PRO-BNP: 2135 PG/ML
PROTHROMBIN TIME: 11.3 SEC (ref 9.1–12.3)
RBC # BLD: 4.14 M/UL (ref 3.95–5.11)
SODIUM BLD-SCNC: 141 MMOL/L (ref 135–144)
TROPONIN, HIGH SENSITIVITY: 72 NG/L (ref 0–14)
TROPONIN, HIGH SENSITIVITY: 82 NG/L (ref 0–14)
TROPONIN, HIGH SENSITIVITY: 83 NG/L (ref 0–14)
WBC # BLD: 6.8 K/UL (ref 3.5–11.3)

## 2023-01-25 PROCEDURE — 6370000000 HC RX 637 (ALT 250 FOR IP): Performed by: STUDENT IN AN ORGANIZED HEALTH CARE EDUCATION/TRAINING PROGRAM

## 2023-01-25 PROCEDURE — 71045 X-RAY EXAM CHEST 1 VIEW: CPT

## 2023-01-25 PROCEDURE — 85027 COMPLETE CBC AUTOMATED: CPT

## 2023-01-25 PROCEDURE — 82805 BLOOD GASES W/O2 SATURATION: CPT

## 2023-01-25 PROCEDURE — 80048 BASIC METABOLIC PNL TOTAL CA: CPT

## 2023-01-25 PROCEDURE — 99285 EMERGENCY DEPT VISIT HI MDM: CPT

## 2023-01-25 PROCEDURE — 84484 ASSAY OF TROPONIN QUANT: CPT

## 2023-01-25 PROCEDURE — 2500000003 HC RX 250 WO HCPCS: Performed by: EMERGENCY MEDICINE

## 2023-01-25 PROCEDURE — 2060000000 HC ICU INTERMEDIATE R&B

## 2023-01-25 PROCEDURE — 83880 ASSAY OF NATRIURETIC PEPTIDE: CPT

## 2023-01-25 PROCEDURE — 36415 COLL VENOUS BLD VENIPUNCTURE: CPT

## 2023-01-25 PROCEDURE — 6360000002 HC RX W HCPCS: Performed by: CLINICAL NURSE SPECIALIST

## 2023-01-25 PROCEDURE — 96374 THER/PROPH/DIAG INJ IV PUSH: CPT

## 2023-01-25 PROCEDURE — 93005 ELECTROCARDIOGRAM TRACING: CPT | Performed by: STUDENT IN AN ORGANIZED HEALTH CARE EDUCATION/TRAINING PROGRAM

## 2023-01-25 PROCEDURE — 6360000002 HC RX W HCPCS: Performed by: STUDENT IN AN ORGANIZED HEALTH CARE EDUCATION/TRAINING PROGRAM

## 2023-01-25 PROCEDURE — 82947 ASSAY GLUCOSE BLOOD QUANT: CPT

## 2023-01-25 PROCEDURE — 85610 PROTHROMBIN TIME: CPT

## 2023-01-25 PROCEDURE — 99223 1ST HOSP IP/OBS HIGH 75: CPT | Performed by: STUDENT IN AN ORGANIZED HEALTH CARE EDUCATION/TRAINING PROGRAM

## 2023-01-25 PROCEDURE — 6370000000 HC RX 637 (ALT 250 FOR IP): Performed by: CLINICAL NURSE SPECIALIST

## 2023-01-25 PROCEDURE — 2500000003 HC RX 250 WO HCPCS: Performed by: STUDENT IN AN ORGANIZED HEALTH CARE EDUCATION/TRAINING PROGRAM

## 2023-01-25 RX ORDER — SODIUM CHLORIDE 0.9 % (FLUSH) 0.9 %
10 SYRINGE (ML) INJECTION PRN
Status: DISCONTINUED | OUTPATIENT
Start: 2023-01-25 | End: 2023-02-06 | Stop reason: HOSPADM

## 2023-01-25 RX ORDER — AMLODIPINE BESYLATE 10 MG/1
10 TABLET ORAL DAILY
Status: DISCONTINUED | OUTPATIENT
Start: 2023-01-25 | End: 2023-02-06 | Stop reason: HOSPADM

## 2023-01-25 RX ORDER — INSULIN GLARGINE 100 [IU]/ML
55 INJECTION, SOLUTION SUBCUTANEOUS 2 TIMES DAILY
Status: DISCONTINUED | OUTPATIENT
Start: 2023-01-25 | End: 2023-01-25

## 2023-01-25 RX ORDER — AMLODIPINE BESYLATE 10 MG/1
10 TABLET ORAL DAILY
Status: DISCONTINUED | OUTPATIENT
Start: 2023-01-26 | End: 2023-01-25 | Stop reason: SDUPTHER

## 2023-01-25 RX ORDER — POTASSIUM CHLORIDE 20 MEQ/1
40 TABLET, EXTENDED RELEASE ORAL PRN
Status: DISCONTINUED | OUTPATIENT
Start: 2023-01-25 | End: 2023-02-06 | Stop reason: HOSPADM

## 2023-01-25 RX ORDER — SODIUM CHLORIDE 0.9 % (FLUSH) 0.9 %
5-40 SYRINGE (ML) INJECTION EVERY 12 HOURS SCHEDULED
Status: DISCONTINUED | OUTPATIENT
Start: 2023-01-25 | End: 2023-02-06 | Stop reason: HOSPADM

## 2023-01-25 RX ORDER — NITROGLYCERIN 20 MG/100ML
INJECTION INTRAVENOUS
Status: DISCONTINUED
Start: 2023-01-25 | End: 2023-01-25

## 2023-01-25 RX ORDER — HYDRALAZINE HYDROCHLORIDE 20 MG/ML
10 INJECTION INTRAMUSCULAR; INTRAVENOUS EVERY 6 HOURS PRN
Status: DISCONTINUED | OUTPATIENT
Start: 2023-01-25 | End: 2023-02-06 | Stop reason: HOSPADM

## 2023-01-25 RX ORDER — ONDANSETRON 4 MG/1
4 TABLET, ORALLY DISINTEGRATING ORAL EVERY 8 HOURS PRN
Status: DISCONTINUED | OUTPATIENT
Start: 2023-01-25 | End: 2023-02-06 | Stop reason: HOSPADM

## 2023-01-25 RX ORDER — ACETAMINOPHEN 650 MG/1
650 SUPPOSITORY RECTAL EVERY 6 HOURS PRN
Status: DISCONTINUED | OUTPATIENT
Start: 2023-01-25 | End: 2023-02-06 | Stop reason: HOSPADM

## 2023-01-25 RX ORDER — ASPIRIN 81 MG/1
81 TABLET, CHEWABLE ORAL DAILY
Status: DISCONTINUED | OUTPATIENT
Start: 2023-01-25 | End: 2023-02-06 | Stop reason: HOSPADM

## 2023-01-25 RX ORDER — ATORVASTATIN CALCIUM 40 MG/1
40 TABLET, FILM COATED ORAL NIGHTLY
Status: DISCONTINUED | OUTPATIENT
Start: 2023-01-25 | End: 2023-02-06 | Stop reason: HOSPADM

## 2023-01-25 RX ORDER — SODIUM BICARBONATE 650 MG/1
1300 TABLET ORAL 2 TIMES DAILY
Status: DISCONTINUED | OUTPATIENT
Start: 2023-01-25 | End: 2023-02-06 | Stop reason: HOSPADM

## 2023-01-25 RX ORDER — CARVEDILOL 25 MG/1
25 TABLET ORAL 2 TIMES DAILY
Status: DISCONTINUED | OUTPATIENT
Start: 2023-01-25 | End: 2023-01-30

## 2023-01-25 RX ORDER — POLYETHYLENE GLYCOL 3350 17 G/17G
17 POWDER, FOR SOLUTION ORAL DAILY PRN
Status: DISCONTINUED | OUTPATIENT
Start: 2023-01-25 | End: 2023-02-06 | Stop reason: HOSPADM

## 2023-01-25 RX ORDER — POTASSIUM CHLORIDE 7.45 MG/ML
10 INJECTION INTRAVENOUS PRN
Status: DISCONTINUED | OUTPATIENT
Start: 2023-01-25 | End: 2023-02-06 | Stop reason: HOSPADM

## 2023-01-25 RX ORDER — MAGNESIUM SULFATE 1 G/100ML
1000 INJECTION INTRAVENOUS PRN
Status: DISCONTINUED | OUTPATIENT
Start: 2023-01-25 | End: 2023-02-06 | Stop reason: HOSPADM

## 2023-01-25 RX ORDER — CARVEDILOL 12.5 MG/1
12.5 TABLET ORAL 2 TIMES DAILY
Status: DISCONTINUED | OUTPATIENT
Start: 2023-01-25 | End: 2023-01-25

## 2023-01-25 RX ORDER — LABETALOL HYDROCHLORIDE 5 MG/ML
10 INJECTION, SOLUTION INTRAVENOUS EVERY 6 HOURS PRN
Status: DISCONTINUED | OUTPATIENT
Start: 2023-01-25 | End: 2023-02-06 | Stop reason: HOSPADM

## 2023-01-25 RX ORDER — SODIUM CHLORIDE 9 MG/ML
INJECTION, SOLUTION INTRAVENOUS PRN
Status: DISCONTINUED | OUTPATIENT
Start: 2023-01-25 | End: 2023-02-06 | Stop reason: HOSPADM

## 2023-01-25 RX ORDER — PANTOPRAZOLE SODIUM 40 MG/1
40 TABLET, DELAYED RELEASE ORAL
Status: DISCONTINUED | OUTPATIENT
Start: 2023-01-26 | End: 2023-02-06 | Stop reason: HOSPADM

## 2023-01-25 RX ORDER — NITROGLYCERIN 20 MG/100ML
5-200 INJECTION INTRAVENOUS CONTINUOUS
Status: DISCONTINUED | OUTPATIENT
Start: 2023-01-25 | End: 2023-01-26

## 2023-01-25 RX ORDER — ACETAMINOPHEN 325 MG/1
650 TABLET ORAL EVERY 6 HOURS PRN
Status: DISCONTINUED | OUTPATIENT
Start: 2023-01-25 | End: 2023-02-06 | Stop reason: HOSPADM

## 2023-01-25 RX ORDER — TAMSULOSIN HYDROCHLORIDE 0.4 MG/1
0.4 CAPSULE ORAL DAILY
Status: DISCONTINUED | OUTPATIENT
Start: 2023-01-25 | End: 2023-01-29

## 2023-01-25 RX ORDER — INSULIN GLARGINE 100 [IU]/ML
55 INJECTION, SOLUTION SUBCUTANEOUS 2 TIMES DAILY
Status: DISCONTINUED | OUTPATIENT
Start: 2023-01-26 | End: 2023-01-26

## 2023-01-25 RX ORDER — ONDANSETRON 2 MG/ML
4 INJECTION INTRAMUSCULAR; INTRAVENOUS EVERY 6 HOURS PRN
Status: DISCONTINUED | OUTPATIENT
Start: 2023-01-25 | End: 2023-02-06 | Stop reason: HOSPADM

## 2023-01-25 RX ORDER — GABAPENTIN 300 MG/1
300 CAPSULE ORAL DAILY
Status: DISCONTINUED | OUTPATIENT
Start: 2023-01-25 | End: 2023-01-27

## 2023-01-25 RX ORDER — HEPARIN SODIUM 5000 [USP'U]/ML
5000 INJECTION, SOLUTION INTRAVENOUS; SUBCUTANEOUS EVERY 8 HOURS SCHEDULED
Status: DISCONTINUED | OUTPATIENT
Start: 2023-01-25 | End: 2023-02-06 | Stop reason: HOSPADM

## 2023-01-25 RX ORDER — BUMETANIDE 0.25 MG/ML
2 INJECTION, SOLUTION INTRAMUSCULAR; INTRAVENOUS 2 TIMES DAILY
Status: DISCONTINUED | OUTPATIENT
Start: 2023-01-25 | End: 2023-01-29

## 2023-01-25 RX ORDER — NITROGLYCERIN 20 MG/100ML
5-200 INJECTION INTRAVENOUS CONTINUOUS
Status: DISCONTINUED | OUTPATIENT
Start: 2023-01-25 | End: 2023-01-25

## 2023-01-25 RX ADMIN — AMLODIPINE BESYLATE 10 MG: 10 TABLET ORAL at 23:02

## 2023-01-25 RX ADMIN — ATORVASTATIN CALCIUM 40 MG: 80 TABLET, FILM COATED ORAL at 20:24

## 2023-01-25 RX ADMIN — HYDRALAZINE HYDROCHLORIDE 10 MG: 20 INJECTION INTRAMUSCULAR; INTRAVENOUS at 22:59

## 2023-01-25 RX ADMIN — SODIUM BICARBONATE 1300 MG: 648 TABLET ORAL at 20:23

## 2023-01-25 RX ADMIN — Medication 10 MG: at 18:19

## 2023-01-25 RX ADMIN — HEPARIN SODIUM 5000 UNITS: 5000 INJECTION INTRAVENOUS; SUBCUTANEOUS at 15:54

## 2023-01-25 RX ADMIN — BUMETANIDE 2 MG: 0.25 INJECTION INTRAMUSCULAR; INTRAVENOUS at 15:56

## 2023-01-25 RX ADMIN — GABAPENTIN 300 MG: 300 CAPSULE ORAL at 15:54

## 2023-01-25 RX ADMIN — NITROGLYCERIN 20 MCG/MIN: 20 INJECTION INTRAVENOUS at 12:30

## 2023-01-25 RX ADMIN — CARVEDILOL 25 MG: 25 TABLET, FILM COATED ORAL at 20:23

## 2023-01-25 RX ADMIN — BUMETANIDE 2 MG: 0.25 INJECTION INTRAMUSCULAR; INTRAVENOUS at 21:11

## 2023-01-25 RX ADMIN — HEPARIN SODIUM 5000 UNITS: 5000 INJECTION INTRAVENOUS; SUBCUTANEOUS at 23:43

## 2023-01-25 ASSESSMENT — ENCOUNTER SYMPTOMS
CONSTIPATION: 0
SHORTNESS OF BREATH: 1
ABDOMINAL DISTENTION: 0
EYE ITCHING: 0
COLOR CHANGE: 0
EYE DISCHARGE: 0
ABDOMINAL PAIN: 0
BLOOD IN STOOL: 0

## 2023-01-25 ASSESSMENT — LIFESTYLE VARIABLES: HOW OFTEN DO YOU HAVE A DRINK CONTAINING ALCOHOL: NEVER

## 2023-01-25 NOTE — ED NOTES
Pt taken off of BIPAP, given lunch tray, patient tolerating nasal cannula at 3.5L.       Rafy Snell, RN  01/25/23 9801

## 2023-01-25 NOTE — ED NOTES
Pt resting comfortably on the cot, even and nonlabored RR, patient denies any needs at this time. Bed in lowest position. Call light within reach, will continue to monitor.        Darien Rust RN  01/25/23 4450

## 2023-01-25 NOTE — ED NOTES
Notified Dr. José Miguel Russell of patient's current rate of Nitro. Pt to get labetolol dose per Dr. José Miguel Russell.       Fernanda Kline RN  01/25/23 0579

## 2023-01-25 NOTE — H&P
[facility]  Office: [address], DO, Tati Come, DO, Vasquez Esquivel, DO, Nyla Marsh Blood, DO, Daniel Kearney MD, Willey Cooks, MD, Caroline Branch MD, King Emmanuel MD,  Andreea Acosta MD, Rosa Traore MD, Stan Gonzalez, DO, Kendrick Szymanski MD,  Dnea Landaverde MD, Sarath Aaron MD, Goldie Jaime DO, Afua Wick MD, Dinah Bailon MD, Yael Gloria, DO, Bernice Durán MD, Kenzie Gloria MD, Paco Schroeder MD, Chong Sands MD, Oksana Yeung DO, Rocco Alfaro MD, Ashely Farfan MD, Ishaan Faith, CNP,  Burr Schirmer, CNP, Rosa Martinez, CNP, Kwasi Martinez, CNP,  Cezar Reece, [facility], Milagros Yoo, CNP, Real Jones, CNP, Ann-Marie Buchanan, CNP, Juliana Miller, CNP, Brice Juares, CNP, Huyen Cantu PA-C, Apolonia Meyer, CNS, Mackenzie Vallejo, CNP, Koffi Marrow, [address]    HISTORY AND PHYSICAL EXAMINATION            Date:   1/25/2023  Patient name:  Isac Humphries  Date of admission:  1/25/2023 10:13 AM  MRN:   [id number]  Account:  [de-identified]  YOB: 1960  PCP:    AFSANEH Aragon CNP  Room:   21/21  Code Status:    Full Code    Chief Complaint:     Chief Complaint   Patient presents with    Shortness of Breath    Weight Gain       History Obtained From:     patient, family member - sister, electronic medical record    History of Present Illness:     Isac Humphries is a 57 y.o. Non- / non  female who presents with Shortness of Breath and Weight Gain   and is admitted to the hospital for the management of Acute respiratory distress. 57year-old female with known medical history of diastolic heart failure, CKD, hypertension, hyperlipidemia, type 2 diabetes mellitus, coronary artery disease with history of CABG presents to the hospital with worsening shortness of breath and leg swelling for last 1 month.   Patient states that she was on dialysis after she got contrast for a CT PE scan and was recently taken off dialysis in August 2022 by Dr. Kathleen Pineda. Her kidney numbers have remained stable after getting up dialysis. Patient reports that she started noticing increasing swelling of her lower extremities and increasing shortness of breath on minimal exertion. Patient followed up with her PCP who advised patient to get chest x-ray done which showed congestion. Patient was then asked by PCP to come to the hospital.    In the ER patient was noted to be hypertensive with blood pressure 186/88, she was hypoxic saturating 78% on room air. She was initially started on nonrebreather and then switched to BiPAP. On my examination patient was on BiPAP. She was also started on nitro drip for hypertension and pulmonary edema. Her creatinine is around 2.12. Troponin of 82 and 83.     Past Medical History:     Past Medical History:   Diagnosis Date    Arthritis     Backache, unspecified     Cerebral artery occlusion with cerebral infarction (HCC)     CHF (congestive heart failure) (HCC)     Chronic kidney disease     Coronary atherosclerosis of artery bypass graft     COVID 1/31/2022    Cramp of limb     Gallstones     Hemodialysis patient ([facility]     Hyperlipidemia     Hypertension     Insomnia     Neuromuscular disorder ([facility]     Pneumonia     Psychiatric problem     Thyroid disease     Type II or unspecified type diabetes mellitus with renal manifestations, not stated as uncontrolled(250.40)     Type II or unspecified type diabetes mellitus without mention of complication, not stated as uncontrolled     Unspecified vitamin D deficiency         Past Surgical History:     Past Surgical History:   Procedure Laterality Date    ANKLE FRACTURE SURGERY      AV FISTULA CREATION  12/14/2021    BACK SURGERY      BREAST SURGERY      CARDIAC SURGERY      CARPAL TUNNEL RELEASE      CHOLECYSTECTOMY, OPEN N/A     COLONOSCOPY      CORONARY ARTERY BYPASS GRAFT      x3 DIALYSIS FISTULA CREATION Left 12/14/2021    LEFT AV FISTULA CREATION UPPER EXTREMITY performed by Rajwinder Pereira MD at [address]      IR TUNNELED CATHETER PLACEMENT GREATER THAN 5 YEARS  8/18/2021    IR TUNNELED CATHETER PLACEMENT GREATER THAN 5 YEARS 8/18/2021 [facility] SPECIAL PROCEDURES    KNEE ARTHROSCOPY      right    OTHER SURGICAL HISTORY  04/06/2022    cvc exchange    TONSILLECTOMY          Medications Prior to Admission:     Prior to Admission medications    Medication Sig Start Date End Date Taking?  Authorizing Provider   Continuous Blood Gluc Sensor (DEXCOM G6 SENSOR) MISC 1 each by Does not apply route 4 times daily 1/19/23   Mauricio AFSANEH Barnard - CNP   Continuous Blood Gluc  ([address]) [address] 1 each by Does not apply route 4 times daily 1/19/23   AFSANEH Aviles - FRANKY   linaclotide [facility]) 145 MCG capsule Take 1 capsule by mouth every morning (before breakfast) 1/19/23 2/18/23  AFSANEH Aviles CNP   sodium bicarbonate 650 MG tablet Take 2 tablets by mouth 2 times daily  Patient taking differently: Take 1,300 mg by mouth 2 times daily Two tab BID 1/16/23 1/16/24  Tyrell Porter MD   insulin glargine [facility]) 100 UNIT/ML injection pen Inject 55 Units into the skin 2 times daily 12/13/22 1/18/23  AFSANEH Aviles CNP   allopurinol (ZYLOPRIM) 100 MG tablet Take 1 tablet by mouth daily 12/8/22   Tyrell Porter MD   furosemide (LASIX) 80 MG tablet Take 1.5 tablets by mouth 2 times daily  Patient taking differently: Take 120 mg by mouth 2 times daily Two tabs BID 11/2/22   Tyrell Porter MD   pantoprazole (PROTONIX) 40 MG tablet TAKE 1 TABLET BY MOUTH ONCE DAILY IN THE MORNING BEFORE BREAKFAST 10/20/22   AFSANEH Aviles CNP   atorvastatin (LIPITOR) 10 MG tablet Take 1 tablet by mouth once daily 10/20/22   AFSANEH Aviles CNP   ranolazine (RANEXA) 1000 MG extended release tablet TAKE 1  BY MOUTH TWICE DAILY 10/20/22   AFSANEH Madrid CNP   tamsulosin [facility]) 0.4 MG capsule Take 1 capsule by mouth daily 9/27/22   AFSANEH Madrid CNP   Insulin Pen Needle (EASY TOUCH PEN NEEDLES) 29G X 12MM MISC 5 each by Does not apply route daily 9/22/22 1/18/23  AFSANEH Madrid CNP   Blood Glucose Monitoring Suppl (TRUE METRIX GO GLUCOSE METER) w/Device KIT 1 each by Does not apply route 4 times daily 9/19/22   AFSANEH Madrid CNP   blood glucose monitor strips Test 3 times a day & as needed for symptoms of irregular blood glucose. Dispense sufficient amount for indicated testing frequency plus additional to accommodate PRN testing needs. 9/19/22   AFSANEH Madrid CNP   AZO-CRANBERRY PO Take 2 capsules by mouth daily    Historical Provider, MD   Calcium Polycarbophil (FIBER-CAPS PO) Take 2 capsules by mouth in the morning and at bedtime    Historical Provider, MD   Lactobacillus (PROBIOTIC ACIDOPHILUS PO) Take by mouth daily    Historical Provider, MD   gabapentin (NEURONTIN) 300 MG capsule Take 300 mg by mouth in the morning.     Historical Provider, MD   acetaminophen (TYLENOL) 325 MG tablet Take 650 mg by mouth every 6 hours as needed for Pain    Historical Provider, MD   carvedilol (COREG) 3.125 MG tablet Take 1 tablet by mouth 2 times daily 6/15/22 1/18/23  AFSANEH Madrid CNP   melatonin 10 MG CAPS capsule Take 10 mg by mouth nightly    Historical Provider, MD   ReliOn Lancets Micro-Thin 33G MISC USE AS DIRECTED EVERY DAY 1/28/22   Historical Provider, MD   HUMALOG 100 UNIT/ML injection vial Inject into the skin 3 times daily (before meals) Indications: 15 units and sliding scale (patient reports only the sliding scale)  11/29/21   Historical Provider, MD   Lancets MISC 1 each by Does not apply route daily 11/30/21   AFSANEH Madrid CNP   docusate sodium (COLACE) 100 MG capsule Take 1 capsule by mouth 2 times daily 11/22/21   Elder Adrian, MD   aspirin 81 MG chewable tablet Take 1 tablet by mouth daily 21   Mathieu Grady MD   allopurinol (ZYLOPRIM) 100 MG tablet Take 1 tablet by mouth daily 21   AFSANEH Jeronimo CNP        Allergies:     Adhesive tape, Metformin and related, Ace inhibitors, Iv dye [iodides], Nsaids, and Metformin and related    Social History:     Tobacco:    reports that she has never smoked. She has never used smokeless tobacco.  Alcohol:      reports no history of alcohol use. Drug Use:  reports no history of drug use. Family History:     Family History   Problem Relation Age of Onset    Diabetes Father     Heart Failure Father        Review of Systems:     Positive and Negative as described in HPI. Review of Systems   Constitutional:  Positive for activity change and fatigue. Negative for appetite change, chills and diaphoresis. HENT:  Negative for congestion and drooling. Eyes:  Negative for discharge and itching. Respiratory:  Positive for shortness of breath. Cardiovascular:  Positive for leg swelling. Negative for chest pain and palpitations. Gastrointestinal:  Negative for abdominal distention, abdominal pain, blood in stool and constipation. Endocrine: Negative for cold intolerance and heat intolerance. Genitourinary:  Negative for difficulty urinating, flank pain and frequency. Musculoskeletal:  Negative for arthralgias. Skin:  Positive for pallor. Negative for color change. Allergic/Immunologic: Negative for environmental allergies. Neurological:  Negative for dizziness, facial asymmetry and headaches. Hematological:  Negative for adenopathy. Psychiatric/Behavioral:  Negative for agitation and behavioral problems.       Physical Exam:   BP (!) 153/80   Pulse 80   Temp 98.1 °F (36.7 °C) (Oral)   Resp 26   SpO2 90%   Temp (24hrs), Av.1 °F (36.7 °C), Min:98.1 °F (36.7 °C), Max:98.1 °F (36.7 °C)    Recent Labs     23  1451   POCGLU 84     No intake or output data in the 24 hours ending 23 1622    Physical Exam  Constitutional:       Appearance: She is obese. HENT:      Head: Normocephalic. Right Ear: External ear normal.      Left Ear: External ear normal.      Nose: Nose normal.   Eyes:      Pupils: Pupils are equal, round, and reactive to light. Cardiovascular:      Rate and Rhythm: Normal rate and regular rhythm. Pulses: Normal pulses. Pulmonary:      Comments: On bipap,decr breath sounds bilaterally  Occasional crackles  No wheezing  Abdominal:      General: Abdomen is flat. There is no distension. Tenderness: There is no guarding. Musculoskeletal:         General: Normal range of motion. Cervical back: Normal range of motion. Right lower leg: Edema present. Left lower leg: Edema present. Comments: 2+ edema   Skin:     General: Skin is warm. Findings: No bruising or lesion. Neurological:      General: No focal deficit present. Mental Status: She is alert and oriented to person, place, and time.    Psychiatric:         Mood and Affect: Mood normal.       Investigations:      Laboratory Testing:  Recent Results (from the past 24 hour(s))   CBC    Collection Time: 01/25/23 10:59 AM   Result Value Ref Range    WBC 6.8 3.5 - 11.3 k/uL    RBC 4.14 3.95 - 5.11 m/uL    Hemoglobin 12.9 11.9 - 15.1 g/dL    Hematocrit 41.2 36.3 - 47.1 %    MCV 99.5 82.6 - 102.9 fL    MCH 31.2 25.2 - 33.5 pg    MCHC 31.3 28.4 - 34.8 g/dL    RDW 16.9 (H) 11.8 - 14.4 %    Platelets 828 889 - 329 k/uL    MPV 10.2 8.1 - 13.5 fL    NRBC Automated 0.0 0.0 per 100 WBC   Basic Metabolic Panel    Collection Time: 01/25/23 10:59 AM   Result Value Ref Range    Glucose 102 (H) 70 - 99 mg/dL    BUN 31 (H) 8 - 23 mg/dL    Creatinine 2.12 (H) 0.50 - 0.90 mg/dL    Est, Glom Filt Rate 26 (L) >60 mL/min/1.73m2    Calcium 8.9 8.6 - 10.4 mg/dL    Sodium 141 135 - 144 mmol/L    Potassium 4.0 3.7 - 5.3 mmol/L    Chloride 108 (H) 98 - 107 mmol/L    CO2 22 20 - 31 mmol/L    Anion Gap 11 9 - 17 mmol/L   BLOOD GAS, VENOUS    Collection Time: 01/25/23 10:59 AM   Result Value Ref Range    pH, Felix 7.409 7.320 - 7.420    pCO2, Felix 36.2 (L) 39 - 55 mm Hg    pO2, Felix 58.8 (H) 30 - 50 mm Hg    HCO3, Venous 22.4 (L) 24 - 30 mmol/L    Negative Base Excess, Felix 1.3 0.0 - 2.0 mmol/L    O2 Sat, Felix 91.1 (H) 60.0 - 85.0 %    Carboxyhemoglobin 3.6 0 - 5 %    Pt Temp 37.0     FIO2 INFORMATION NOT PROVIDED    Protime-INR    Collection Time: 01/25/23 10:59 AM   Result Value Ref Range    Protime 11.3 9.1 - 12.3 sec    INR 1.1    Brain Natriuretic Peptide    Collection Time: 01/25/23 10:59 AM   Result Value Ref Range    Pro-BNP 2,135 (H) <300 pg/mL   Troponin    Collection Time: 01/25/23 10:59 AM   Result Value Ref Range    Troponin, High Sensitivity 82 (HH) 0 - 14 ng/L   Troponin    Collection Time: 01/25/23 12:14 PM   Result Value Ref Range    Troponin, High Sensitivity 83 (HH) 0 - 14 ng/L   POC Glucose Fingerstick    Collection Time: 01/25/23  2:51 PM   Result Value Ref Range    POC Glucose 84 65 - 105 mg/dL   POCT Glucose    Collection Time: 01/25/23  2:52 PM   Result Value Ref Range    Glucose 82 mg/dL    QC OK? yes        Imaging/Diagnostics:  XR CHEST (2 VW)    Result Date: 1/24/2023  Findings suggest congestive heart failure The findings were sent to the Radiology Results [address] at 5:31 pm on 1/24/2023 to be communicated to a licensed caregiver. XR CHEST PORTABLE    Result Date: 1/25/2023  Appearance favoring worsening Caretha Dontae vascular congestion/interstitial edema.        Assessment :      Hospital Problems             Last Modified POA    * (Principal) Acute respiratory distress 1/25/2023 Yes    Type 2 diabetes mellitus with hyperglycemia, with long-term current use of insulin ([address] 1/25/2023 Yes    Hypertensive urgency 1/25/2023 Yes    Atherosclerosis of coronary artery bypass graft of native heart without angina pectoris 1/25/2023 Yes    Acute on chronic diastolic heart failure ([address] 1/25/2023 Yes    Diabetic polyneuropathy associated with type 2 diabetes mellitus ([facility] 1/25/2023 Yes    History of coronary artery bypass graft 1/25/2023 Yes    Mixed hyperlipidemia 1/25/2023 Yes    Stage 4 chronic kidney disease ([facility] 1/25/2023 Yes    Morbid obesity with BMI of 40.0-44.9, adult ([facility] 1/25/2023 Yes       Plan:     Patient status inpatient in the Progressive Unit/Step down    Acute on chronic diastolic heart failure--patient was on BiPAP, switched to nasal cannula oxygen at 3.5 to 4 L, on nitro drip, maintaining saturation, continue diuresis, switch to Bumex 2 mg IV twice daily, monitor intake and output, will consult nephrology, watch daily weights, fluid restriction to 1500 mm. Acute on chronic respiratory failure with hypoxia-continue diuresis, wean down oxygen as needed, patient uses oxygen only if needed at home. Weaned off BiPAP, continue fluid restriction    Hypertensive urgency-on nitro drip, target SBP of 160 in the first 6 to 8 hours and then normalized thereafter, resume home Coreg. CKD stage 4-creatinine has been stable around 2.1, monitor of nephrotoxic drugs, monitor urine output, nephrology consulted at patient's request.  Continue sodium bicarb    Type 2 diabetes mellitus-continue Lantus 55 units twice daily, low-dose insulin sliding scale, POCT blood sugar checks with meals and at bedtime. Elevated troponin-likely secondary to CKD, troponin around baseline, no chest pain reported, no EKG changes, will trend for     Coronary artery disease with history of CABG-continue aspirin, statin    Disequilibrium syndrome-follows with neurology outpatient, symptoms were more related to dialysis, now improved after dialysis stopped.     Obesity needs lifestyle modifications for weight loss    Heparin for dvt prop  Renal diet with fluid restriction 1500ml    Consultations:   IP CONSULT TO IV TEAM  IP CONSULT TO INTERNAL MEDICINE  IP CONSULT TO HOSPITALIST  IP CONSULT TO NEPHROLOGY     Patient is admitted as inpatient status because of co-morbidities listed above, severity of signs and symptoms as outlined, requirement for current medical therapies and most importantly because of direct risk to patient if care not provided in a hospital setting. Expected length of stay > 48 hours.     Yarelis Staples MD  1/25/2023  4:22 PM    Copy sent to Dr. Alize Noble, APRN - CNP

## 2023-01-25 NOTE — ED NOTES
Patient presents to the ED with c/o SOB and weight gain. Patient reports that she recently had a chest x-ray yesterday that indicated fluid retention. Patient reports that the SOB has been worsening the last few days. Patient does state she wears 3L O2 at home usually, stating she has episodes of O2 sat in the upper 70's. Pt has history of dialysis, but states she has not had dialysis for approximately a year due to her labs being in normal limits. Patient is alert and oriented x4, answering questions appropriately. Patient is changed into a gown, placed on cardiac monitor, EKG done, IV established, will continue to monitor. On arrival into the room, patient is tachypneic with an O2 sat at 78% on room air, patient placed on NRB with O2 increasing to normal limits.       Rafy Snell RN  01/25/23 1120

## 2023-01-25 NOTE — ED PROVIDER NOTES
ATTENDING  ADDENDUM     Care of this patient was assumed from Dr. Dami Borrero. The patient was seen for Shortness of Breath and Weight Gain  . The patient's initial evaluation and plan have been discussed with the prior provider who initially evaluated the patient. Nursing Notes, Past Medical Hx, Past Surgical Hx, Social Hx, Allergies, and Family Hx were all reviewed. ED COURSE      The patient was given the following medications:  Orders Placed This Encounter   Medications    DISCONTD: nitroGLYCERIN 50 mg in dextrose 5% 250 mL infusion     Order Specific Question:   Titrate Infusion? Answer:   Yes     Order Specific Question:   Initial Infusion Dose: Answer:   20 mcg/min     Order Specific Question:   Goal of Therapy is: Answer:   SBP less than 160 mmHg     Order Specific Question:   Contact Provider if:     Answer:   SBP less than 90 mmHg    DISCONTD: nitroGLYCERIN 200-5 MCG/ML-% infusion     Vignesh Console: cabinet override    aspirin chewable tablet 81 mg    atorvastatin (LIPITOR) tablet 40 mg    carvedilol (COREG) tablet 12.5 mg    bumetanide (BUMEX) injection 2 mg    gabapentin (NEURONTIN) capsule 300 mg    tamsulosin (FLOMAX) capsule 0.4 mg    sodium bicarbonate tablet 1,300 mg    pantoprazole (PROTONIX) tablet 40 mg    DISCONTD: insulin glargine (LANTUS) injection vial 55 Units    nitroGLYCERIN 50 mg in dextrose 5% 250 mL infusion     Change dextrose base to saline if possible     Order Specific Question:   Titrate Infusion? Answer:   Yes     Order Specific Question:   Initial Infusion Dose: Answer:   5 mcg/min     Order Specific Question:   Goal of Therapy is:      Answer:   SBP less than 160 mmHg     Order Specific Question:   Contact Provider if:     Answer:   SBP less than 90 mmHg    sodium chloride flush 0.9 % injection 5-40 mL    sodium chloride flush 0.9 % injection 10 mL    0.9 % sodium chloride infusion    OR Linked Order Group     potassium chloride (KLOR-CON M) extended release tablet 40 mEq     potassium bicarb-citric acid (EFFER-K) effervescent tablet 40 mEq     potassium chloride 10 mEq/100 mL IVPB (Peripheral Line)    magnesium sulfate 1000 mg in dextrose 5% 100 mL IVPB    OR Linked Order Group     ondansetron (ZOFRAN-ODT) disintegrating tablet 4 mg     ondansetron (ZOFRAN) injection 4 mg    polyethylene glycol (GLYCOLAX) packet 17 g    OR Linked Order Group     acetaminophen (TYLENOL) tablet 650 mg     acetaminophen (TYLENOL) suppository 650 mg    heparin (porcine) injection 5,000 Units    insulin glargine (LANTUS) injection vial 55 Units       RECENT VITALS:   Temp: 98.1 °F (36.7 °C), Heart Rate: 74, Resp: 17, BP: (!) 173/82    MEDICAL DECISION MAKING       Admitted for CHF. On nitro and Bi-PAP. Nothing pending.        Kenneth Alfaro MD  Emergency Medicine Attending        Kenneth Alfaro MD  01/25/23 [id number]

## 2023-01-25 NOTE — ED PROVIDER NOTES
STV[facility] 4B STEPDOWN  Emergency Department Encounter  Emergency Medicine Resident     Pt Name:Estefany Sun  MRN: [id number]  [place] 1960  Date of evaluation: 1/25/23  PCP:  AFSANEH Rosario CNP      CHIEF COMPLAINT       Chief Complaint   Patient presents with    Shortness of Breath    Weight Gain       HISTORY OF PRESENT ILLNESS  (Location/Symptom, Timing/Onset, Context/Setting, Quality, Duration, Modifying Factors, Severity.)      Anisha Orourke is a 57 y.o. female who presents with worsening shortness of breath, difficulty breathing lower extremity swelling. She has had a history of CABG in 2005. Also has previous history of end-stage renal failure. But kidneys have recovered. She still has baseline CKD but is off dialysis since August.  Follows with Dr. Sukumar Estrada. Was evaluated and had labs done recently. As well as a chest x-ray that did show pulmonary edema and patient was directed to come to the emergency room. She reports worsening symptoms over the last week. She is on a diuretic but reports her legs are more swollen than usual.  Patient also reports 20 pound weight gain over the past 7 months. She is accompanied by her sister who provides much of the medical history. Denies any chest pain. Does have a chronic nasal drainage. Which is ongoing over the last month. No fevers or chills. She does use oxygen as needed. And has been using it at 3.5 L at home. She states that she has been monitoring her pulse ox and after she ambulates. She checks it when she sits down and she is very short of breath and reports that her pulse ox has been reading in the 70s. Upon arrival to the emergency room when placed on the monitor O2 sat on room air was 78%. Patient does have a history of DVT in the past.  Was on anticoagulation for some time but is no longer on it. She does take aspirin.     PAST MEDICAL / SURGICAL / SOCIAL / FAMILY HISTORY      has a past medical history of Arthritis, Backache, unspecified, Cerebral artery occlusion with cerebral infarction ([facility] CHF (congestive heart failure) ([facility] Chronic kidney disease, Coronary atherosclerosis of artery bypass graft, COVID, Cramp of limb, Gallstones, Hemodialysis patient ([facility] Hyperlipidemia, Hypertension, Insomnia, Neuromuscular disorder ([facility] [facility] Pneumonia, Psychiatric problem, Thyroid disease, Type II or unspecified type diabetes mellitus with renal manifestations, not stated as uncontrolled(250.40), Type II or unspecified type diabetes mellitus without mention of complication, not stated as uncontrolled, and Unspecified vitamin D deficiency. has a past surgical history that includes Coronary artery bypass graft; Knee arthroscopy; Carpal tunnel release; Breast surgery; Tonsillectomy; Hand surgery; Ankle fracture surgery; Cholecystectomy, open (N/A); IR TUNNELED CVC PLACE WO SQ PORT/PUMP > 5 YEARS (8/18/2021); AV fistula creation (12/14/2021); Dialysis fistula creation (Left, 12/14/2021); other surgical history (04/06/2022); back surgery; Colonoscopy; eye surgery; fracture surgery; and Cardiac surgery.       Social History     Socioeconomic History    Marital status: Single     Spouse name: Not on file    Number of children: Not on file    Years of education: Not on file    Highest education level: Not on file   Occupational History    Not on file   Tobacco Use    Smoking status: Never    Smokeless tobacco: Never   Vaping Use    Vaping Use: Never used   Substance and Sexual Activity    Alcohol use: No    Drug use: No    Sexual activity: Not Currently   Other Topics Concern    Not on file   Social History Narrative    Not on file     Social Determinants of Health     Financial Resource Strain: Low Risk     Difficulty of Paying Living Expenses: Not very hard   Food Insecurity: No Food Insecurity    Worried About Running Out of Food in the Last Year: Never true    Ran Out of Food in the Last Year: Never true   Transportation Needs: No Transportation Needs    Lack of Transportation (Medical): No    Lack of Transportation (Non-Medical): No   Physical Activity: Insufficiently Active    Days of Exercise per Week: 1 day    Minutes of Exercise per Session: 20 min   Stress: Stress Concern Present    Feeling of Stress : To some extent   Social Connections: Socially Isolated    Frequency of Communication with Friends and Family: More than three times a week    Frequency of Social Gatherings with Friends and Family: More than three times a week    Attends [employer] Services: Never    Active Member of Clubs or Organizations: No    Attends Club or Organization Meetings: Never    Marital Status: Never    Intimate Partner Violence: Not on file   Housing Stability: Low Risk     Unable to Pay for Housing in the Last Year: No    Number of [place] in the Last Year: 1    Unstable Housing in the Last Year: No       Family History   Problem Relation Age of Onset    Diabetes Father     Heart Failure Father        Allergies:  Adhesive tape, Metformin and related, Ace inhibitors, Iv dye [iodides], Nsaids, and Metformin and related    Home Medications:  Prior to Admission medications    Medication Sig Start Date End Date Taking?  Authorizing Provider   Continuous Blood Gluc Sensor (DEXCOM G6 SENSOR) MISC 1 each by Does not apply route 4 times daily 1/19/23   AFSANEH Caruso CNP   Continuous Blood Gluc  ([address]) KODY 1 each by Does not apply route 4 times daily 1/19/23   AFSANEH Caruso CNP   linaclotide [facility]) 145 MCG capsule Take 1 capsule by mouth every morning (before breakfast) 1/19/23 2/18/23  AFSANEH Caruso CNP   sodium bicarbonate 650 MG tablet Take 2 tablets by mouth 2 times daily  Patient taking differently: Take 1,300 mg by mouth 2 times daily Two tab BID 1/16/23 1/16/24  Leeann Malone MD   insulin glargine [facility]) 100 UNIT/ML injection pen Inject 55 Units into the skin 2 times daily 12/13/22 1/18/23  AFSANEH Nicole CNP   allopurinol (ZYLOPRIM) 100 MG tablet Take 1 tablet by mouth daily 12/8/22   Tramaine Anderson MD   furosemide (LASIX) 80 MG tablet Take 1.5 tablets by mouth 2 times daily  Patient taking differently: Take 120 mg by mouth 2 times daily Two tabs BID 11/2/22   Tramaien Anderson MD   pantoprazole (PROTONIX) 40 MG tablet TAKE 1 TABLET BY MOUTH ONCE DAILY IN THE MORNING BEFORE BREAKFAST 10/20/22   AFSANEH Nicole CNP   atorvastatin (LIPITOR) 10 MG tablet Take 1 tablet by mouth once daily 10/20/22   AFSANEH Nicole CNP   ranolazine (RANEXA) 1000 MG extended release tablet TAKE 1  BY MOUTH TWICE DAILY 10/20/22   AFSANEH Nicole CNP   tamsulosin (FLOMAX) 0.4 MG capsule Take 1 capsule by mouth daily 9/27/22   AFSANEH Nicole CNP   Insulin Pen Needle (EASY TOUCH PEN NEEDLES) 29G X 12MM MISC 5 each by Does not apply route daily 9/22/22 1/18/23  AFSANEH Nicole CNP   Blood Glucose Monitoring Suppl (TRUE METRIX GO GLUCOSE METER) w/Device KIT 1 each by Does not apply route 4 times daily 9/19/22   AFSANEH Nicole CNP   blood glucose monitor strips Test 3 times a day & as needed for symptoms of irregular blood glucose. Dispense sufficient amount for indicated testing frequency plus additional to accommodate PRN testing needs. 9/19/22   AFSANEH Nicole CNP   AZO-CRANBERRY PO Take 2 capsules by mouth daily    Historical Provider, MD   Calcium Polycarbophil (FIBER-CAPS PO) Take 2 capsules by mouth in the morning and at bedtime    Historical Provider, MD   Lactobacillus (PROBIOTIC ACIDOPHILUS PO) Take by mouth daily    Historical Provider, MD   gabapentin (NEURONTIN) 300 MG capsule Take 300 mg by mouth in the morning.     Historical Provider, MD   acetaminophen (TYLENOL) 325 MG tablet Take 650 mg by mouth every 6 hours as needed for Pain    Historical Provider, MD   carvedilol (COREG) 3.125 MG tablet Take 1 tablet by mouth 2 times daily 6/15/22 1/18/23  AFSANEH Nicole - CNP   melatonin 10 MG CAPS capsule Take 10 mg by mouth nightly    Historical Provider, MD   ReliOn Lancets Micro-Thin 33G MISC USE AS DIRECTED EVERY DAY 1/28/22   Historical Provider, MD   HUMALOG 100 UNIT/ML injection vial Inject into the skin 3 times daily (before meals) Indications: 15 units and sliding scale (patient reports only the sliding scale)  11/29/21   Historical Provider, MD   Lancets MISC 1 each by Does not apply route daily 11/30/21   AFSANEH Barriga CNP   docusate sodium (COLACE) 100 MG capsule Take 1 capsule by mouth 2 times daily 11/22/21   Shannon Be MD   aspirin 81 MG chewable tablet Take 1 tablet by mouth daily 9/25/21   Miranda Ro MD   allopurinol (ZYLOPRIM) 100 MG tablet Take 1 tablet by mouth daily 4/14/21   AFSANEH Barriga - CNP         REVIEW OF SYSTEMS       Review of Systems   Respiratory:  Positive for shortness of breath. Cardiovascular:  Positive for leg swelling. PHYSICAL EXAM      INITIAL VITALS:   /87   Pulse 68   Temp 97.6 °F (36.4 °C) (Temporal)   Resp 30   Ht 5' 5\" (1.651 m)   Wt 252 lb 6.8 oz (114.5 kg)   SpO2 95%   BMI 42.01 kg/m²     Physical Exam  Constitutional:       Comments: Patient is awake and alert, and speaking in full sentenced, able to answer simple questions, Sister present in the room is answering majority of questions   Pulmonary:      Breath sounds: Examination of the right-lower field reveals rales. Examination of the left-lower field reveals rales. Rales present. Musculoskeletal:      Comments: Diffusely edematous lower extremities equal bilaterally,          DDX/DIAGNOSTIC RESULTS / EMERGENCY DEPARTMENT COURSE / MDM     Medical Decision Making  H/o CHF on diuretics, worsening symptoms over the past week, is compliant with medications, previous DVT in the past, hypoxia upon arrival, requriing oxygen, CXR yesterday showing pulmonary edema. Will check labs, and plan on placing on bipap, check blood gas, and diurese. Anticipate admission. will check labs, and bnp check troponin. Does not sound infectious, she is afebrile. Amount and/or Complexity of Data Reviewed  Independent Historian:      Details: sister  External Data Reviewed: labs and radiology. Details: from yesterday  Labs: ordered. Decision-making details documented in ED Course. Radiology: ordered and independent interpretation performed. Details: fluid overload    Risk  Decision regarding hospitalization. ED Course as of 01/27/23 0720   Wed Jan 25, 2023   1138 On bipap at this time, and 30% fio2 [CE]   1138 INR: 1.1 [CE]   1149 Creatinine(!): 2.12 [CE]   1150 Troponin, High Sensitivity(!!): 82 [CE]   1150 Pro-BNP(!): 2,135 [CE]   1307 Spoke with patient and discussed the needs for admission, she is tolerating bipap well. Plan for admission for CHF, discussed with intermed provider who accepted the patient for admission. I did discuss posible PE r/o patient is hesitant due to h/o Renal failure after IV contrast for CTC, so discussed possible VQ scan with physician. Will see how she does with CHF treatment and if worsening will look for PE.  [CE]   1323 EKG shows normal sinus rhythm, there is low voltage noted, normal axis no ST elevations or depressions noted. Ventricular rate of 79 QRS of 98 QT corrected of 458. [CE]      ED Course User Index  [facility]       CONSULTS:      IP CONSULT TO HOSPITALIST          FINAL IMPRESSION      1. Congestive heart failure, unspecified HF chronicity, unspecified heart failure type ([facility]    2. Hypoxia          DISPOSITION / PLAN     DISPOSITION Admitted 01/25/2023 02:43:24 PM      PATIENT REFERRED TO:  No follow-up provider specified.     DISCHARGE MEDICATIONS:  Current Discharge Medication List          Mary Nelson DO  Emergency Medicine Resident    (Please note that portions of thisnote were completed with a voice recognition program.  Efforts were made to edit the dictations but occasionally words are mis-transcribed.)        Anna Marie Eaton DO  01/27/23 8841

## 2023-01-25 NOTE — ED NOTES
Patient placed and taken off of bedpan, purewick placed on patient.       Crystal Abreu RN  01/25/23 0082

## 2023-01-25 NOTE — ED NOTES
Pt resting comfortably on the cot, even and nonlabored RR, patient denies any needs at this time. Bed in lowest position. Call light within reach, will continue to monitor.        Ruddy Carson RN  01/25/23 2989

## 2023-01-25 NOTE — ED NOTES
Pt resting comfortably on the cot, even and nonlabored RR, patient denies any needs at this time. Bed in lowest position. Call light within reach, will continue to monitor.        Marion Ocampo RN  01/25/23 1511

## 2023-01-26 PROBLEM — I50.9 CONGESTIVE HEART FAILURE (HCC): Status: ACTIVE | Noted: 2023-01-26

## 2023-01-26 PROBLEM — R80.9 NEPHROTIC RANGE PROTEINURIA: Status: ACTIVE | Noted: 2023-01-26

## 2023-01-26 LAB
ABSOLUTE EOS #: 0.24 K/UL (ref 0–0.44)
ABSOLUTE IMMATURE GRANULOCYTE: <0.03 K/UL (ref 0–0.3)
ABSOLUTE LYMPH #: 0.95 K/UL (ref 1.1–3.7)
ABSOLUTE MONO #: 0.53 K/UL (ref 0.1–1.2)
ANION GAP SERPL CALCULATED.3IONS-SCNC: 10 MMOL/L (ref 9–17)
BASOPHILS # BLD: 1 % (ref 0–2)
BASOPHILS ABSOLUTE: 0.05 K/UL (ref 0–0.2)
BUN BLDV-MCNC: 30 MG/DL (ref 8–23)
CALCIUM SERPL-MCNC: 8.5 MG/DL (ref 8.6–10.4)
CHLORIDE BLD-SCNC: 107 MMOL/L (ref 98–107)
CO2: 22 MMOL/L (ref 20–31)
CREAT SERPL-MCNC: 1.99 MG/DL (ref 0.5–0.9)
CREATININE URINE: 65.4 MG/DL (ref 28–217)
EKG ATRIAL RATE: 79 BPM
EKG P AXIS: 55 DEGREES
EKG P-R INTERVAL: 170 MS
EKG Q-T INTERVAL: 400 MS
EKG QRS DURATION: 98 MS
EKG QTC CALCULATION (BAZETT): 458 MS
EKG R AXIS: 5 DEGREES
EKG T AXIS: 19 DEGREES
EKG VENTRICULAR RATE: 79 BPM
EOSINOPHILS RELATIVE PERCENT: 3 % (ref 1–4)
GFR SERPL CREATININE-BSD FRML MDRD: 28 ML/MIN/1.73M2
GLUCOSE BLD-MCNC: 162 MG/DL (ref 70–99)
GLUCOSE BLD-MCNC: 164 MG/DL (ref 65–105)
GLUCOSE BLD-MCNC: 293 MG/DL (ref 65–105)
GLUCOSE BLD-MCNC: 298 MG/DL (ref 65–105)
GLUCOSE BLD-MCNC: 325 MG/DL (ref 65–105)
HCT VFR BLD CALC: 41.7 % (ref 36.3–47.1)
HEMOGLOBIN: 12.5 G/DL (ref 11.9–15.1)
IMMATURE GRANULOCYTES: 0 %
INR BLD: 1
LYMPHOCYTES # BLD: 12 % (ref 24–43)
MCH RBC QN AUTO: 31.5 PG (ref 25.2–33.5)
MCHC RBC AUTO-ENTMCNC: 30 G/DL (ref 28.4–34.8)
MCV RBC AUTO: 105 FL (ref 82.6–102.9)
MONOCYTES # BLD: 7 % (ref 3–12)
NRBC AUTOMATED: 0 PER 100 WBC
PDW BLD-RTO: 16.5 % (ref 11.8–14.4)
PLATELET # BLD: 181 K/UL (ref 138–453)
PMV BLD AUTO: 10.2 FL (ref 8.1–13.5)
POTASSIUM SERPL-SCNC: 4 MMOL/L (ref 3.7–5.3)
PROTHROMBIN TIME: 10.7 SEC (ref 9.1–12.3)
RBC # BLD: 3.97 M/UL (ref 3.95–5.11)
RBC # BLD: ABNORMAL 10*6/UL
SEG NEUTROPHILS: 77 % (ref 36–65)
SEGMENTED NEUTROPHILS ABSOLUTE COUNT: 6.03 K/UL (ref 1.5–8.1)
SODIUM BLD-SCNC: 139 MMOL/L (ref 135–144)
TOTAL PROTEIN, URINE: 184 MG/DL
WBC # BLD: 7.8 K/UL (ref 3.5–11.3)

## 2023-01-26 PROCEDURE — 80048 BASIC METABOLIC PNL TOTAL CA: CPT

## 2023-01-26 PROCEDURE — 6360000002 HC RX W HCPCS: Performed by: STUDENT IN AN ORGANIZED HEALTH CARE EDUCATION/TRAINING PROGRAM

## 2023-01-26 PROCEDURE — 2500000003 HC RX 250 WO HCPCS: Performed by: STUDENT IN AN ORGANIZED HEALTH CARE EDUCATION/TRAINING PROGRAM

## 2023-01-26 PROCEDURE — 97530 THERAPEUTIC ACTIVITIES: CPT

## 2023-01-26 PROCEDURE — 82947 ASSAY GLUCOSE BLOOD QUANT: CPT

## 2023-01-26 PROCEDURE — 97162 PT EVAL MOD COMPLEX 30 MIN: CPT

## 2023-01-26 PROCEDURE — 6370000000 HC RX 637 (ALT 250 FOR IP): Performed by: NURSE PRACTITIONER

## 2023-01-26 PROCEDURE — 93005 ELECTROCARDIOGRAM TRACING: CPT | Performed by: STUDENT IN AN ORGANIZED HEALTH CARE EDUCATION/TRAINING PROGRAM

## 2023-01-26 PROCEDURE — 84156 ASSAY OF PROTEIN URINE: CPT

## 2023-01-26 PROCEDURE — 6370000000 HC RX 637 (ALT 250 FOR IP): Performed by: CLINICAL NURSE SPECIALIST

## 2023-01-26 PROCEDURE — 85025 COMPLETE CBC W/AUTO DIFF WBC: CPT

## 2023-01-26 PROCEDURE — 36415 COLL VENOUS BLD VENIPUNCTURE: CPT

## 2023-01-26 PROCEDURE — 6370000000 HC RX 637 (ALT 250 FOR IP): Performed by: STUDENT IN AN ORGANIZED HEALTH CARE EDUCATION/TRAINING PROGRAM

## 2023-01-26 PROCEDURE — 6370000000 HC RX 637 (ALT 250 FOR IP)

## 2023-01-26 PROCEDURE — 85610 PROTHROMBIN TIME: CPT

## 2023-01-26 PROCEDURE — 99232 SBSQ HOSP IP/OBS MODERATE 35: CPT | Performed by: STUDENT IN AN ORGANIZED HEALTH CARE EDUCATION/TRAINING PROGRAM

## 2023-01-26 PROCEDURE — 82570 ASSAY OF URINE CREATININE: CPT

## 2023-01-26 PROCEDURE — 99221 1ST HOSP IP/OBS SF/LOW 40: CPT | Performed by: INTERNAL MEDICINE

## 2023-01-26 PROCEDURE — 2060000000 HC ICU INTERMEDIATE R&B

## 2023-01-26 PROCEDURE — 2580000003 HC RX 258: Performed by: STUDENT IN AN ORGANIZED HEALTH CARE EDUCATION/TRAINING PROGRAM

## 2023-01-26 PROCEDURE — 97116 GAIT TRAINING THERAPY: CPT

## 2023-01-26 PROCEDURE — 93010 ELECTROCARDIOGRAM REPORT: CPT | Performed by: INTERNAL MEDICINE

## 2023-01-26 RX ORDER — INSULIN GLARGINE 100 [IU]/ML
25 INJECTION, SOLUTION SUBCUTANEOUS 2 TIMES DAILY
Status: DISCONTINUED | OUTPATIENT
Start: 2023-01-26 | End: 2023-01-26

## 2023-01-26 RX ORDER — CHOLECALCIFEROL (VITAMIN D3) 125 MCG
10 CAPSULE ORAL NIGHTLY PRN
Status: DISCONTINUED | OUTPATIENT
Start: 2023-01-26 | End: 2023-02-06 | Stop reason: HOSPADM

## 2023-01-26 RX ORDER — INSULIN LISPRO 100 [IU]/ML
0-4 INJECTION, SOLUTION INTRAVENOUS; SUBCUTANEOUS NIGHTLY
Status: DISCONTINUED | OUTPATIENT
Start: 2023-01-26 | End: 2023-01-28

## 2023-01-26 RX ORDER — INSULIN LISPRO 100 [IU]/ML
0-8 INJECTION, SOLUTION INTRAVENOUS; SUBCUTANEOUS
Status: DISCONTINUED | OUTPATIENT
Start: 2023-01-26 | End: 2023-01-28

## 2023-01-26 RX ORDER — ISOSORBIDE DINITRATE 10 MG/1
10 TABLET ORAL 3 TIMES DAILY
Status: DISCONTINUED | OUTPATIENT
Start: 2023-01-26 | End: 2023-02-06 | Stop reason: HOSPADM

## 2023-01-26 RX ORDER — INSULIN GLARGINE 100 [IU]/ML
40 INJECTION, SOLUTION SUBCUTANEOUS 2 TIMES DAILY
Status: DISCONTINUED | OUTPATIENT
Start: 2023-01-26 | End: 2023-01-28

## 2023-01-26 RX ORDER — CHOLECALCIFEROL (VITAMIN D3) 125 MCG
10 CAPSULE ORAL ONCE
Status: COMPLETED | OUTPATIENT
Start: 2023-01-26 | End: 2023-01-26

## 2023-01-26 RX ORDER — DEXTROSE MONOHYDRATE 100 MG/ML
INJECTION, SOLUTION INTRAVENOUS CONTINUOUS PRN
Status: DISCONTINUED | OUTPATIENT
Start: 2023-01-26 | End: 2023-02-06 | Stop reason: HOSPADM

## 2023-01-26 RX ORDER — RANOLAZINE 500 MG/1
1000 TABLET, EXTENDED RELEASE ORAL DAILY
Status: DISCONTINUED | OUTPATIENT
Start: 2023-01-27 | End: 2023-02-06 | Stop reason: HOSPADM

## 2023-01-26 RX ORDER — UREA 10 %
3 LOTION (ML) TOPICAL NIGHTLY PRN
Status: DISCONTINUED | OUTPATIENT
Start: 2023-01-26 | End: 2023-02-06 | Stop reason: HOSPADM

## 2023-01-26 RX ORDER — DOCUSATE SODIUM 100 MG/1
100 CAPSULE, LIQUID FILLED ORAL 2 TIMES DAILY
Status: DISCONTINUED | OUTPATIENT
Start: 2023-01-26 | End: 2023-02-06 | Stop reason: HOSPADM

## 2023-01-26 RX ADMIN — BUMETANIDE 2 MG: 0.25 INJECTION INTRAMUSCULAR; INTRAVENOUS at 08:43

## 2023-01-26 RX ADMIN — INSULIN GLARGINE 25 UNITS: 100 INJECTION, SOLUTION SUBCUTANEOUS at 08:47

## 2023-01-26 RX ADMIN — BUMETANIDE 2 MG: 0.25 INJECTION INTRAMUSCULAR; INTRAVENOUS at 20:05

## 2023-01-26 RX ADMIN — DOCUSATE SODIUM 100 MG: 100 CAPSULE ORAL at 20:04

## 2023-01-26 RX ADMIN — SODIUM CHLORIDE, PRESERVATIVE FREE 10 ML: 5 INJECTION INTRAVENOUS at 20:06

## 2023-01-26 RX ADMIN — PANTOPRAZOLE SODIUM 40 MG: 40 TABLET, DELAYED RELEASE ORAL at 06:18

## 2023-01-26 RX ADMIN — Medication 10 MG: at 00:33

## 2023-01-26 RX ADMIN — Medication 10 MG: at 02:35

## 2023-01-26 RX ADMIN — HEPARIN SODIUM 5000 UNITS: 5000 INJECTION INTRAVENOUS; SUBCUTANEOUS at 23:37

## 2023-01-26 RX ADMIN — DOCUSATE SODIUM 100 MG: 100 CAPSULE ORAL at 10:02

## 2023-01-26 RX ADMIN — TAMSULOSIN HYDROCHLORIDE 0.4 MG: 0.4 CAPSULE ORAL at 08:39

## 2023-01-26 RX ADMIN — Medication 10 MG: at 21:59

## 2023-01-26 RX ADMIN — GABAPENTIN 300 MG: 300 CAPSULE ORAL at 08:40

## 2023-01-26 RX ADMIN — ISOSORBIDE DINITRATE 10 MG: 10 TABLET ORAL at 20:04

## 2023-01-26 RX ADMIN — INSULIN GLARGINE 40 UNITS: 100 INJECTION, SOLUTION SUBCUTANEOUS at 20:05

## 2023-01-26 RX ADMIN — SODIUM BICARBONATE 1300 MG: 648 TABLET ORAL at 20:04

## 2023-01-26 RX ADMIN — INSULIN LISPRO 2 UNITS: 100 INJECTION, SOLUTION INTRAVENOUS; SUBCUTANEOUS at 11:55

## 2023-01-26 RX ADMIN — SODIUM BICARBONATE 1300 MG: 648 TABLET ORAL at 08:40

## 2023-01-26 RX ADMIN — INSULIN LISPRO 6 UNITS: 100 INJECTION, SOLUTION INTRAVENOUS; SUBCUTANEOUS at 17:15

## 2023-01-26 RX ADMIN — HEPARIN SODIUM 5000 UNITS: 5000 INJECTION INTRAVENOUS; SUBCUTANEOUS at 08:41

## 2023-01-26 RX ADMIN — HEPARIN SODIUM 5000 UNITS: 5000 INJECTION INTRAVENOUS; SUBCUTANEOUS at 16:24

## 2023-01-26 RX ADMIN — ASPIRIN 81 MG: 81 TABLET, CHEWABLE ORAL at 08:40

## 2023-01-26 RX ADMIN — ATORVASTATIN CALCIUM 40 MG: 80 TABLET, FILM COATED ORAL at 20:04

## 2023-01-26 RX ADMIN — SODIUM CHLORIDE, PRESERVATIVE FREE 10 ML: 5 INJECTION INTRAVENOUS at 08:42

## 2023-01-26 RX ADMIN — AMLODIPINE BESYLATE 10 MG: 10 TABLET ORAL at 08:40

## 2023-01-26 RX ADMIN — CARVEDILOL 25 MG: 25 TABLET, FILM COATED ORAL at 20:04

## 2023-01-26 RX ADMIN — CARVEDILOL 25 MG: 25 TABLET, FILM COATED ORAL at 08:40

## 2023-01-26 RX ADMIN — ISOSORBIDE DINITRATE 10 MG: 10 TABLET ORAL at 13:13

## 2023-01-26 ASSESSMENT — PAIN SCALES - GENERAL: PAINLEVEL_OUTOF10: 5

## 2023-01-26 ASSESSMENT — PAIN DESCRIPTION - ORIENTATION: ORIENTATION: LOWER

## 2023-01-26 ASSESSMENT — PAIN DESCRIPTION - LOCATION: LOCATION: BACK

## 2023-01-26 ASSESSMENT — PAIN DESCRIPTION - DESCRIPTORS: DESCRIPTORS: ACHING

## 2023-01-26 ASSESSMENT — PAIN DESCRIPTION - PAIN TYPE: TYPE: CHRONIC PAIN

## 2023-01-26 ASSESSMENT — PAIN - FUNCTIONAL ASSESSMENT: PAIN_FUNCTIONAL_ASSESSMENT: ACTIVITIES ARE NOT PREVENTED

## 2023-01-26 NOTE — ED NOTES
Received pt from [facility]. Pt seen awake, on BIPAP, not in CP distress. Pt updated on POC. Abdiel continue to monitor.       Anneliese Mendosa RN  01/25/23 2013

## 2023-01-26 NOTE — ED NOTES
Pt requested to be off bipap, placed on o2 at 4lpm via nasal cannula.       Richie Sandoval RN  01/25/23 1122

## 2023-01-26 NOTE — ED NOTES
The following labs were labeled with appropriate pt sticker and tubed to lab:     [] Blue     [] Lavender   [] on ice  [x] Green/yellow  [] Green/black [] on ice  [] Mary Fiore  [] on ice  [] Yellow  [] Red  [] Type/ Screen  [] ABG  [] VBG    [] COVID-19 swab    [] Rapid  [] PCR  [] Flu swab  [] Peds Viral Panel     [] Urine Sample  [] Fecal Sample  [] Pelvic Cultures  [] Blood Cultures  [] X 2  [] STREP Cultures       Ken Campbell RN  01/25/23 1920

## 2023-01-26 NOTE — CARE COORDINATION
Case Management Assessment  Initial Evaluation    Date/Time of Evaluation: 1/26/2023 8:27 AM  Assessment Completed by: Fidel Morocho RN    If patient is discharged prior to next notation, then this note serves as note for discharge by case management. Patient Name: Bijal Rodriguez                   YOB: 1960  Diagnosis: Acute respiratory distress [R06.03]  Hypoxia [R09.02]  Congestive heart failure, unspecified HF chronicity, unspecified heart failure type [facility]) [I50.9]                   Date / Time: 1/25/2023 10:13 AM    Patient Admission Status: Inpatient   Readmission Risk (Low < 19, Mod (19-27), High > 27): Readmission Risk Score: 20.4    Current PCP: AFSANEH Samaniego CNP  PCP verified by CM? Yes    Chart Reviewed: Yes      History Provided by: Patient  Patient Orientation: Alert and Oriented    Patient Cognition: Alert    Hospitalization in the last 30 days (Readmission):  No    If yes, Readmission Assessment in CM Navigator will be completed. Advance Directives:      Code Status: Full Code   Patient's Primary Decision Maker is: Legal Next of Kin      Discharge Planning:    Patient lives with: Family Members Type of Home: House  Primary Care Giver: Self  Patient Support Systems include: Family Members   Current Financial resources: Medicaid, Medicare  Current community resources:    Current services prior to admission: Oxygen Therapy            Current DME:              Type of Home Care services:  None    ADLS  Prior functional level: Independent in ADLs/IADLs  Current functional level: Independent in ADLs/IADLs    PT AM-PAC:   /24  OT AM-PAC:   /24    Family can provide assistance at DC: Would you like Case Management to discuss the discharge plan with any other family members/significant others, and if so, who?  No  Plans to Return to Present Housing: Yes  Other Identified Issues/Barriers to RETURNING to current housing:   Potential Assistance needed at discharge: Durable Medical Equipment            Potential DME: Oxygen Therapy (Comment), Walker (O2 3L nc with [employer])  Patient expects to discharge to: [facility] for transportation at discharge: Other (see comment) (one of her sisters)    Financial    Payor: DALTON [place] / Plan: Avinash Rodriguez / Product Type: *No Product type* /     Does insurance require precert for SNF: Yes    Potential assistance Purchasing Medications: No  [facility]        [facility] [address] [place] [address]  Phone: [phone] Fax: [phone] [facility] [phone] [facility] [phone]  [address] [address]  Phone: [phone] Fax: [phone]      Notes:    Factors facilitating achievement of predicted outcomes: Family support    Barriers to discharge: Decreased endurance    Additional Case Management Notes: Home with one of her sisters. Has O2 3L nc at home. Called [facility] at [facility]. [facility]'s respiratory dept and notified of above. Monitor for needs. No current home care. Currently, she is not going to hemodialysis. Pt states, \"I don't do that anymore. \"     The Plan for Transition of Care is related to the following treatment goals of Acute respiratory distress [R06.03]  Hypoxia [R09.02]  Congestive heart failure, unspecified HF chronicity, unspecified heart failure type ([address] [N61.9]    IF APPLICABLE: The Patient and/or patient representative Nguyễn Matias and her family were provided with a choice of provider and agrees with the discharge plan. Freedom of choice list with basic dialogue that supports the patient's individualized plan of care/goals and shares the quality data associated with the providers was provided to:     Patient Representative Name:     The Patient and/or Patient Representative Agree with the Discharge Plan?       Farrukh Sheldon, RN  Case Management Department  Ph: [phone] Fax:

## 2023-01-26 NOTE — ED NOTES
Pt requested to be off bipap for now, placed on nasal caanula at 4 lpm.      Ken Campbell RN  01/25/23 1924

## 2023-01-26 NOTE — ED NOTES
Pt report given to Shweta Nvaa RN. All questions answered.      Felix Alanis RN  01/26/23 0115       Felix Alanis RN  01/26/23 0030

## 2023-01-26 NOTE — ED NOTES
Admitting team notified of patient's nitro dose and blood pressure. Patient unable to be intermediate status per bed placement unless nitro dose below 100mcg/min. Awaiting further orders.       Merlene Cooley RN  01/25/23 2759

## 2023-01-26 NOTE — CONSULTS
Nephrology Consult Note    Reason for Consult:  elevated creat   Requesting Physician:  Dr Emily Goldstein     Chief Complaint:  edema, wt gain     History Obtained From:  patient, electronic medical record    History of Present Illness: This is a 57 y.o. female who presented to the hospital for evaluation of increasing edema, increasing weight gain of about 8 to 9 pounds, and a progressive exertional dyspnea. The symptoms started about 8 or 9 days ago, she did seek medical attention the PCP who ordered a chest x-ray and was concerned about some congestive heart failure. Patient was contacted and advised to come to the hospital.    She is an office patient of Dr. Angela Vargas. Previously she has had acute on chronic renal injury and ended up on dialysis for few months secondary likely to contrast associated renal injury and as of August 2022 she has been off dialysis. Her serum creatinine seems to be hovering around 2.2 -2.4 range. At home she typically takes furosemide twice daily (120 mg twice a day). She tells me she has been very compliant in regards to her that medication use. She does not give any history of recent change in her diet, increasing salt intake, noncompliance to medications, does watch her fluids at home as well. She is going having some exertional dyspnea along with. She denies any palpitations or any episodes of syncope. She did not give any history of orthopnea. She denies any NSAID use. Patient has had a longstanding history of diabetes mellitus type 2. Her CKD was originally thought to be related to diabetic renal disease. She does also have history of essential hypertension, dyslipidemia, diastolic CHF. She was last evaluated in the office approximately 4 to 5 weeks ago at which time her physical exam and her labs were actually relatively stable.     Admission chest x-ray showed evidence of vascular congestion  Last cardiac echo from April 2022 showed evidence of mild diastolic dysfunction, LV function preserved at greater than 55%, moderate left ventricular hypertrophy. On review of most recent urine studies, she seems to have developed worsening proteinuria. Her proteinuria last year was quantified somewhere around 2.5 g and most recent studies indicate proteinuria to be almost at 5 g. She denies any history of diabetic retinopathy but does admit to symptoms of neuropathy. Pt denies any hx of heavy or prolonged NSAID use. There is no history of blood or bone marrow disorders. There is no hx of jaundice or hepatitis or sexually transmitted disease. Pt has no hx of collagen vascular disease or vasculitis. There is no hx of paraprotein disease. No hx of recurrent UTI , incontinence or recurrent nephrolithiasis.      Past Medical History:        Diagnosis Date    Arthritis     Backache, unspecified     Cerebral artery occlusion with cerebral infarction [facility])     CHF (congestive heart failure) (HCC)     Chronic kidney disease     Coronary atherosclerosis of artery bypass graft     COVID 1/31/2022    Cramp of limb     Gallstones     Hemodialysis patient ([facility] [address]     Hyperlipidemia     Hypertension     Insomnia     Neuromuscular disorder ([facility] [address]     Pneumonia     Psychiatric problem     Thyroid disease     Type II or unspecified type diabetes mellitus with renal manifestations, not stated as uncontrolled(250.40)     Type II or unspecified type diabetes mellitus without mention of complication, not stated as uncontrolled     Unspecified vitamin D deficiency        Past Surgical History:        Procedure Laterality Date    ANKLE FRACTURE SURGERY      AV FISTULA CREATION  12/14/2021    BACK SURGERY      BREAST SURGERY      CARDIAC SURGERY      CARPAL TUNNEL RELEASE      CHOLECYSTECTOMY, OPEN N/A     COLONOSCOPY      CORONARY ARTERY BYPASS GRAFT      x3    DIALYSIS FISTULA CREATION Left 12/14/2021    LEFT AV FISTULA CREATION UPPER EXTREMITY performed by Darien Mccollum Christofer Scruggs MD at [address] [facility]      HAND SURGERY      IR TUNNELED CATHETER PLACEMENT GREATER THAN 5 YEARS  8/18/2021    IR TUNNELED CATHETER PLACEMENT GREATER THAN 5 YEARS 8/18/2021 [facility] SPECIAL PROCEDURES    KNEE ARTHROSCOPY      right    OTHER SURGICAL HISTORY  04/06/2022    cvc exchange    TONSILLECTOMY         Current Medications:    melatonin tablet 3 mg, Nightly PRN  melatonin tablet 10 mg, Nightly PRN  insulin glargine (LANTUS) injection vial 25 Units, BID  glucose chewable tablet 16 g, PRN  dextrose bolus 10% 125 mL, PRN   Or  dextrose bolus 10% 250 mL, PRN  glucagon (rDNA) injection 1 mg, PRN  dextrose 10 % infusion, Continuous PRN  docusate sodium (COLACE) capsule 100 mg, BID  [START ON 1/27/2023] ranolazine (RANEXA) extended release tablet 1,000 mg, Daily  isosorbide dinitrate (ISORDIL) tablet 10 mg, TID  insulin lispro (HUMALOG) injection vial 0-8 Units, TID WC  insulin lispro (HUMALOG) injection vial 0-4 Units, Nightly  aspirin chewable tablet 81 mg, Daily  atorvastatin (LIPITOR) tablet 40 mg, Nightly  bumetanide (BUMEX) injection 2 mg, BID  gabapentin (NEURONTIN) capsule 300 mg, Daily  tamsulosin (FLOMAX) capsule 0.4 mg, Daily  sodium bicarbonate tablet 1,300 mg, BID  pantoprazole (PROTONIX) tablet 40 mg, QAM AC  sodium chloride flush 0.9 % injection 5-40 mL, 2 times per day  sodium chloride flush 0.9 % injection 10 mL, PRN  0.9 % sodium chloride infusion, PRN  potassium chloride (KLOR-CON M) extended release tablet 40 mEq, PRN   Or  potassium bicarb-citric acid (EFFER-K) effervescent tablet 40 mEq, PRN   Or  potassium chloride 10 mEq/100 mL IVPB (Peripheral Line), PRN  magnesium sulfate 1000 mg in dextrose 5% 100 mL IVPB, PRN  ondansetron (ZOFRAN-ODT) disintegrating tablet 4 mg, Q8H PRN   Or  ondansetron (ZOFRAN) injection 4 mg, Q6H PRN  polyethylene glycol (GLYCOLAX) packet 17 g, Daily PRN  acetaminophen (TYLENOL) tablet 650 mg, Q6H PRN   Or  acetaminophen (TYLENOL) suppository 650 mg, Q6H PRN  heparin (porcine) injection 5,000 Units, 3 times per day  labetalol (NORMODYNE;TRANDATE) injection 10 mg, Q6H PRN  carvedilol (COREG) tablet 25 mg, BID  amLODIPine (NORVASC) tablet 10 mg, Daily  hydrALAZINE (APRESOLINE) injection 10 mg, Q6H PRN        Allergies:  Adhesive tape, Metformin and related, Ace inhibitors, Iv dye [iodides], Nsaids, and Metformin and related    Social History:   Social History     Socioeconomic History    Marital status: Single     Spouse name: Not on file    Number of children: Not on file    Years of education: Not on file    Highest education level: Not on file   Occupational History    Not on file   Tobacco Use    Smoking status: Never    Smokeless tobacco: Never   Vaping Use    Vaping Use: Never used   Substance and Sexual Activity    Alcohol use: No    Drug use: No    Sexual activity: Not Currently   Other Topics Concern    Not on file   Social History Narrative    Not on file     Social Determinants of Health     Financial Resource Strain: Low Risk     Difficulty of Paying Living Expenses: Not very hard   Food Insecurity: No Food Insecurity    Worried About Running Out of Food in the Last Year: Never true    Ran Out of Food in the Last Year: Never true   Transportation Needs: No Transportation Needs    Lack of Transportation (Medical): No    Lack of Transportation (Non-Medical): No   Physical Activity: Insufficiently Active    Days of Exercise per Week: 1 day    Minutes of Exercise per Session: 20 min   Stress: Stress Concern Present    Feeling of Stress :  To some extent   Social Connections: Socially Isolated    Frequency of Communication with Friends and Family: More than three times a week    Frequency of Social Gatherings with Friends and Family: More than three times a week    Attends [employer] Services: Never    Active Member of Clubs or Organizations: No    Attends Club or Organization Meetings: Never    Marital Status: Never    Intimate Partner Violence: Not on file   Housing Stability: Low Risk     Unable to Pay for Housing in the Last Year: No    Number of Places Lived in the Last Year: 1    Unstable Housing in the Last Year: No       Family History:   Family History   Problem Relation Age of Onset    Diabetes Father     Heart Failure Father        Review of Systems:    Constitutional: No fever, no chills, no lethargy, + weakness. HEENT:  No headache, otalgia, itchy eyes, nasal discharge or sore throat. Cardiac:  No chest pain, + exertional dyspnea, no orthopnea or PND. Chest:              No cough, phlegm or wheezing. Abdomen:  No abdominal pain, nausea or vomiting. Neuro:   No focal weakness, abnormal movements orseizure like activity. Skin:   No rashes, no itching. :   No hematuria, no pyuria, no dysuria, no flank pain. Extremities:  + swelling or joint pains. Objective:  CURRENT TEMPERATURE:  Temp: 97.5 °F (36.4 °C)  MAXIMUM TEMPERATURE OVER 24HRS:  Temp (24hrs), Av.5 °F (36.4 °C), Min:97 °F (36.1 °C), Max:97.9 °F (36.6 °C)    CURRENT RESPIRATORY RATE:  Resp: 30  CURRENT PULSE:  Heart Rate: 66  CURRENT BLOOD PRESSURE:  BP: 122/70  24HR BLOOD PRESSURE RANGE:  Systolic (40CUA), EZZ:658 , Min:109 , CDM:856   ; Diastolic (77LVR), FTW:53, Min:56, Max:105    24HR INTAKE/OUTPUT:    Intake/Output Summary (Last 24 hours) at 2023 1240  Last data filed at 2023 1149  Gross per 24 hour   Intake 799 ml   Output 1725 ml   Net -926 ml     Patient Vitals for the past 96 hrs (Last 3 readings):   Weight   23 0200 244 lb (110.7 kg)     Physical Exam:  General appearance:Awake, alert, in no acute distress  Skin: warm and dry, no rash or erythema  Eyes: conjunctivae normal and sclera anicteric  ENT: :no thrush no pharyngeal congestion    Neck: no carotid bruit ,+/- JVD,no carotid Lymphadenopathy, noThyromegaly   Pulmonary: Mild wheezing with basal crackles heard.   Cardiovascular: Normal S1 & S2,  No S3 or  S4, no Pericardial Rub no Murmur   Abdomen: soft nontender, bowel sounds present, no organomegaly,  no ascites  Extremities:+edema    Labs:   CBC:  Recent Labs     01/25/23  1059 01/26/23  0644   WBC 6.8 7.8   RBC 4.14 3.97   HGB 12.9 12.5   HCT 41.2 41.7   MCV 99.5 105.0*   MCH 31.2 31.5   MCHC 31.3 30.0   RDW 16.9* 16.5*    181   MPV 10.2 10.2      BMP:   Recent Labs     01/25/23  1059 01/25/23  1452 01/25/23  1920 01/26/23  0644     --   --  139   K 4.0  --   --  4.0   *  --   --  107   CO2 22  --   --  22   BUN 31*  --   --  30*   CREATININE 2.12*  --   --  1.99*   GLUCOSE 102* 82 94 162*   CALCIUM 8.9  --   --  8.5*      Urinalysis:  U/A:   Lab Results   Component Value Date/Time    NITRU NEGATIVE 01/10/2023 02:14 PM    COLORU Yellow 01/10/2023 02:14 PM    PHUR 7.0 01/10/2023 02:14 PM    WBCUA 2 TO 5 01/10/2023 02:14 PM    RBCUA 0 TO 2 01/10/2023 02:14 PM    MUCUS 2+ 07/31/2022 12:00 PM    TRICHOMONAS NOT REPORTED 11/14/2021 02:05 AM    YEAST FEW 07/02/2022 09:10 AM    BACTERIA FEW 01/10/2023 02:14 PM    SPECGRAV 1.021 01/10/2023 02:14 PM    LEUKOCYTESUR SMALL 01/10/2023 02:14 PM    UROBILINOGEN Normal 01/10/2023 02:14 PM    BILIRUBINUR NEGATIVE 01/10/2023 02:14 PM    GLUCOSEU NEGATIVE 01/10/2023 02:14 PM    KETUA NEGATIVE 01/10/2023 02:14 PM    AMORPHOUS 2+ 07/31/2022 12:00 PM     Last urine protein creatinine ratio indicates almost 5 g of proteinuria    Radiology:  Chest x-ray showing evidence of mild vascular congestion. Assessment:  1. Stage IV CKD likely from underlying diabetic renal involvement. Was on dialysis for several months, got off dialysis approximately 5-1/2 months ago. 2.  Admitted with weight gain, worsening edema. While this could be a representation of decompensated heart failure I also worry about worsening edema because of worsening proteinuria as quantified within the last 1 month. 3.  Diabetes mellitus type 2  4. Chronic hypertension  5. Diabetic polyneuropathy  6.   History of mild diastolic heart failure, preserved LV function based on last cardiac echo       Plan:  1. Agree and continue with IV Bumex 2 mg twice daily for another day, possibly transition to oral over the next 24 hours or so  2. Likely repeat her random urine protein creatinine ratio this admission as well. 3.  She will do better with 2 g sodium diet and 15 to 1600 cc fluid restriction per day. 4.  She relates allergy primarily cough to lisinopril in the past therefore would not retry ACE inhibitor's at this time. 5.  There might be some merit to adding low-dose losartan instead. 6.  We will make further recommendations  7. Following   Thank you for the consultation. Please do not hesitate to call with questions.     This note is created with the assistance of a speech-recognition program. While intending to generate a document that actually reflects the content of the visit, no guarantees can be provided that every mistake has been identified and corrected by editing    Electronically signed by Kaye Rodríguez MD on 1/26/2023 at 12:40 PM

## 2023-01-26 NOTE — PROGRESS NOTES
Physical Therapy  Facility/Department: [address] STEPDOWN  Physical Therapy Initial Assessment    Name: Francisco Walls  : 1960  MRN: [id number]  Date of Service: 2023  Chief Complaint   Patient presents with    Shortness of Breath    Weight Gain      Discharge Recommendations:  Patient would benefit from continued therapy after discharge   PT Equipment Recommendations  Equipment Needed: No (Pt reports owning a RW, writer recommends  use of RW)      Patient Diagnosis(es): The primary encounter diagnosis was Congestive heart failure, unspecified HF chronicity, unspecified heart failure type ([address] A diagnosis of Hypoxia was also pertinent to this visit. Past Medical History:  has a past medical history of Arthritis, Backache, unspecified, Cerebral artery occlusion with cerebral infarction ([address] CHF (congestive heart failure) ([address] Chronic kidney disease, Coronary atherosclerosis of artery bypass graft, COVID, Cramp of limb, Gallstones, Hemodialysis patient ([address] Hyperlipidemia, Hypertension, Insomnia, Neuromuscular disorder ([address] Pneumonia, Psychiatric problem, Thyroid disease, Type II or unspecified type diabetes mellitus with renal manifestations, not stated as uncontrolled(250.40), Type II or unspecified type diabetes mellitus without mention of complication, not stated as uncontrolled, and Unspecified vitamin D deficiency. Past Surgical History:  has a past surgical history that includes Coronary artery bypass graft; Knee arthroscopy; Carpal tunnel release; Breast surgery; Tonsillectomy; Hand surgery; Ankle fracture surgery; Cholecystectomy, open (N/A); IR TUNNELED CVC PLACE WO SQ PORT/PUMP > 5 YEARS (2021); AV fistula creation (2021); Dialysis fistula creation (Left, 2021); other surgical history (2022); back surgery; Colonoscopy; eye surgery; fracture surgery; and Cardiac surgery.     Assessment   Body Structures, Functions, Activity Limitations Requiring Skilled Therapeutic Intervention: Decreased functional mobility ; Decreased strength;Decreased endurance;Decreased balance  Assessment: Pt with mobility deficits requiring CGA to ambulate 5 feet with a RW with fair stability. Pt significantly limited this date secondary to endurance impairements with increased work of breathing during ambulation and SpO2 dropped from 92-87% following ambulation. Pt would benefit from additional PT upon discharge. PT would be unsafe to return to prior living arrangements based on today's session, will continue to assess pending progress. Therapy Prognosis: Good  Decision Making: Medium Complexity  Requires PT Follow-Up: Yes  Activity Tolerance  Activity Tolerance: Patient limited by endurance; Patient limited by fatigue  Activity Tolerance Comments: SpO2 dropped from 92% to 87% following ambulation bout, quickly recovered to >90% upon return to sitting. Plan   Physcial Therapy Plan  General Plan:  (5-6x/week)  Current Treatment Recommendations: Strengthening, Balance training, Transfer training, Functional mobility training, Home exercise program, Equipment evaluation, education, & procurement, Patient/Caregiver education & training, Safety education & training, Therapeutic activities, Endurance training, Gait training  Safety Devices  Type of Devices: Gait belt, Left in chair, Call light within reach, Nurse notified  Restraints  Restraints Initially in Place: No     Restrictions  Restrictions/Precautions  Required Braces or Orthoses?: No  Position Activity Restriction  Other position/activity restrictions: up with assist     Subjective   General  Patient assessed for rehabilitation services?: Yes  Response To Previous Treatment: Not applicable  Family / Caregiver Present: No  Follows Commands: Within Functional Limits  Subjective  Subjective: Pt supine in bed and agreeable to therapy, RN agreeable to therapy. Pt pleasant and cooperative throughout.   Pt denies pain at rest. Social/Functional History  Social/Functional History  Lives With: Parent (Mother- pt is mother's caregiver)  Type of Home: Condo  Home Layout: One level  Home Access: Ramped entrance  Bathroom Shower/Tub: Walk-in shower  Bathroom Toilet: Handicap height  Bathroom Equipment: Grab bars in shower, Shower chair  Home Equipment: Cane, Rollator, Walker, rolling (pt reports using rollator at a baseline.)  ADL Assistance: [address]: Needs assistance  Meal Prep: Moderate  Laundry: Maximal  Cleaning: Minimal  Shopping: Moderate  Homemaking Responsibilities: Yes (Pt's sister assists with laundry, cooking, and cleaning)  Ambulation Assistance: Independent  Transfer Assistance: Independent  Active : Yes  Mode of Transportation: Car  Occupation: On disability  Leisure & Hobbies: scrapbooking  Additional Comments: Pt reports sisters are able to assist as needed upon discharge. Vision/Hearing  Vision  Vision: Impaired  Vision Exceptions: Wears glasses for reading  Hearing  Hearing: Within functional limits    Cognition   Cognition  Overall Cognitive Status: WFL     Objective                 AROM RLE (degrees)  RLE AROM: WFL  AROM LLE (degrees)  LLE AROM : WFL  AROM RUE (degrees)  RUE AROM : WFL  AROM LUE (degrees)  LUE AROM : WFL  Strength RLE  Strength RLE: WFL  Comment: Grossly 4/5  Strength LLE  Strength LLE: WFL  Comment: Grossly 4/5  Strength RUE  Strength RUE: WFL  Comment: Grossly 4/5  Strength LUE  Strength LUE: WFL  Comment: Grossly 4/5           Bed mobility  Supine to Sit: Contact guard assistance  Sit to Supine:  (pt retired up to a chair at the conclusion of today's session.)  Scooting: Contact guard assistance  Bed Mobility Comments: HOB elevated ~40 degrees with use of handrails. Transfers  Sit to Stand: Contact guard assistance  Stand to Sit: Contact guard assistance  Comment: Verbal cues for hand placement.   Ambulation  Surface: Level tile  Device: Rolling Walker  Assistance: Contact guard assistance  Quality of Gait: fair stability, decreased stride length, no LOB  Gait Deviations: Slow Annie;Decreased step length  Distance: 5 feet  Comments: SpO2 dropped from 92% to 87% following ambulation bout, quickly recovered to >90% upon return to sitting. More Ambulation?: No  Stairs/Curb  Stairs?: No     Balance  Posture: Fair  Sitting - Static: Good;-  Sitting - Dynamic: Fair;+  Standing - Static: Fair  Standing - Dynamic: Fair  Comments: standing balance assessed while using a RW                                                          AM-PAC Score  AM-PAC Inpatient Mobility Raw Score : 17 (01/26/23 1550)  AM-PAC Inpatient T-Scale Score : 42.13 (01/26/23 1550)  Mobility Inpatient CMS 0-100% Score: 50.57 (01/26/23 1550)  Mobility Inpatient CMS G-Code Modifier : CK (01/26/23 1550)            Goals  Short Term Goals  Time Frame for Short Term Goals: 14 visits  Short Term Goal 1: Pt will ambulate 150 feet with a RW and supervision to increase functional independence. Short Term Goal 2: Pt will demonstrate good- dynamic standing balance to decrease fall risk. Short Term Goal 3: Pt will perform sit<>stand transfer independently. Short Term Goal 4: Pt will tolerate a 35 minute therapy session to promote increased endurance. Short Term Goal 5: Pt will perform supine<>sit transfer with supervision  Additional Goals?: No       Education  Patient Education  Education Given To: Patient  Education Provided: Role of Therapy;Transfer Training;Plan of Care; Fall Prevention Strategies  Education Method: Verbal  Barriers to Learning: None  Education Outcome: Verbalized understanding      Therapy Time   Individual Concurrent Group Co-treatment   Time In 6952         Time Out 1424         Minutes 29         Timed Code Treatment Minutes: FRANCISCO Montano 20, PT

## 2023-01-26 NOTE — CONSULTS
Attestation signed by      Attending Physician Statement:    I have discussed the care of  Tameka Romero , including pertinent history and exam findings, with the Cardiology fellow/resident. I have seen and examined the patient and the key elements of all parts of the encounter have been performed by me. I agree with the assessment, plan and orders as documented by the fellow/resident, after I modified exam findings and plan of treatments, and the final version is my approved version of the assessment. Additional Comments: dyspnea with mild occasional chest pressure, weight gain, leg edema. HTN urgency. Acute CHF likely diastolic. Obtain TTE to evaluate for LVEF. Continue diuresis. Has CKD. She has h/o CAD/CABG with last cath 2019- all graft patent. SVG-OM lesion distal to anastomosis small for PCI. Continue BB, CCB, ranexa. Isosorbide dinitrate 10mg TID. DC IV NTG. Apryl Ang MD               [facility] Cardiology Cardiology    Consult / H&P               Today's Date: 1/26/2023  Patient Name: Tameka Romero  Date of admission: 1/25/2023 10:13 AM  Patient's age: 57 y.o., 1960  Admission Dx: Acute respiratory distress [R06.03]  Hypoxia [R09.02]  Congestive heart failure, unspecified HF chronicity, unspecified heart failure type ([address] [I50.9]    Reason for Consult:  Cardiac evaluation    Requesting Physician: Wesley Pennington DO    CHIEF COMPLAINT: Shortness of breath    History Obtained From:  patient, electronic medical record    HISTORY OF PRESENT ILLNESS:      The patient is a 57 y.o.  female who is admitted to the hospital for CHF    Cardiology consulted for elevated troponin, exertional chest pressure    Patient came in with complaints of worsening shortness of breath and pedal edema.   Patient has medical history of diastolic heart failure, CKD, hypertension, hyperlipidemia, type 2 diabetes mellitus, CAD history of CABG    Home Home medications include aspirin, statin, insulin, Ranexa, allopurinol, Lasix 120 mg twice daily, Coreg, Protonix    Patient reports having worsening exertional shortness of breath and chest discomfort since past month. Denies any chest pain. Denies any fevers or chills recently, denies any cough  Reports orthopnea feels better when in a propped up position than lying flat    On arrival to the ER she was afebrile, heart rate in the 70s, hypertensive 186/88  Lab work showed sodium 141, potassium 4, creatinine 2.1 seems close to baseline  Troponin 82> 83> 72, close to baseline  proBNP 2135, elevated from baseline    Admitted for concerns of CHF exacerbation  Was started on nitro infusion for hypertension and pulmonary edema  Initially was on nonrebreather but then needed BiPAP    Last echo in August 2022 showed EF greater than 73% grade 1 diastolic dysfunction  Stress test in 2021, perfusion defect in the lateral wall with minimal reperfusion, patent percent of LV myocardium affected as per chart review medically treated  As per previous cardiology notes cath in October 2019 showed patent grafts with severe OM disease distal to the anastomosis not amenable for PCI    Past Medical History:   has a past medical history of Arthritis, Backache, unspecified, Cerebral artery occlusion with cerebral infarction ([address] CHF (congestive heart failure) ([address] Chronic kidney disease, Coronary atherosclerosis of artery bypass graft, COVID, Cramp of limb, Gallstones, Hemodialysis patient ([address] Hyperlipidemia, Hypertension, Insomnia, Neuromuscular disorder ([address] Pneumonia, Psychiatric problem, Thyroid disease, Type II or unspecified type diabetes mellitus with renal manifestations, not stated as uncontrolled(250.40), Type II or unspecified type diabetes mellitus without mention of complication, not stated as uncontrolled, and Unspecified vitamin D deficiency.     Past Surgical History:   has a past surgical history that includes Coronary artery bypass graft; Knee arthroscopy; Carpal tunnel release; Breast surgery; Tonsillectomy; Hand surgery; Ankle fracture surgery; Cholecystectomy, open (N/A); IR TUNNELED CVC PLACE WO SQ PORT/PUMP > 5 YEARS (8/18/2021); AV fistula creation (12/14/2021); Dialysis fistula creation (Left, 12/14/2021); other surgical history (04/06/2022); back surgery; Colonoscopy; eye surgery; fracture surgery; and Cardiac surgery. Home Medications:    Prior to Admission medications    Medication Sig Start Date End Date Taking?  Authorizing Provider   Continuous Blood Gluc Sensor (DEXCOM G6 SENSOR) MISC 1 each by Does not apply route 4 times daily 1/19/23   AFSANEH Serrato - CNP   Continuous Blood Gluc  ([address]) [address] 1 each by Does not apply route 4 times daily 1/19/23   AFSANEH Serrato - FRANKY   linaclotide [facility]) 145 MCG capsule Take 1 capsule by mouth every morning (before breakfast) 1/19/23 2/18/23  AFSANEH Serrato CNP   sodium bicarbonate 650 MG tablet Take 2 tablets by mouth 2 times daily  Patient taking differently: Take 1,300 mg by mouth 2 times daily Two tab BID 1/16/23 1/16/24  Antonio Glynn MD   insulin glargine [facility]) 100 UNIT/ML injection pen Inject 55 Units into the skin 2 times daily 12/13/22 1/18/23  AFSANEH Serrato CNP   allopurinol (ZYLOPRIM) 100 MG tablet Take 1 tablet by mouth daily 12/8/22   Antonio Glynn MD   furosemide (LASIX) 80 MG tablet Take 1.5 tablets by mouth 2 times daily  Patient taking differently: Take 120 mg by mouth 2 times daily Two tabs BID 11/2/22   Antonio Glynn MD   pantoprazole (PROTONIX) 40 MG tablet TAKE 1 TABLET BY MOUTH ONCE DAILY IN THE MORNING BEFORE BREAKFAST 10/20/22   AFSANEH Serrato CNP   atorvastatin (LIPITOR) 10 MG tablet Take 1 tablet by mouth once daily 10/20/22   AFSANEH Serrato CNP   ranolazine (RANEXA) 1000 MG extended release tablet TAKE 1  BY MOUTH TWICE DAILY 10/20/22   AFSANEH Serrato CNP   tamsulosin (FLOMAX) 0.4 MG capsule Take 1 capsule by mouth daily 9/27/22   AFSANEH Westfall CNP   Insulin Pen Needle (EASY TOUCH PEN NEEDLES) 29G X 12MM MISC 5 each by Does not apply route daily 9/22/22 1/18/23  AFSANEH Westfall CNP   Blood Glucose Monitoring Suppl (TRUE METRIX GO GLUCOSE METER) w/Device KIT 1 each by Does not apply route 4 times daily 9/19/22   AFSANEH Westfall CNP   blood glucose monitor strips Test 3 times a day & as needed for symptoms of irregular blood glucose. Dispense sufficient amount for indicated testing frequency plus additional to accommodate PRN testing needs. 9/19/22   AFSANEH Westfall CNP   AZO-CRANBERRY PO Take 2 capsules by mouth daily    Historical Provider, MD   Calcium Polycarbophil (FIBER-CAPS PO) Take 2 capsules by mouth in the morning and at bedtime    Historical Provider, MD   Lactobacillus (PROBIOTIC ACIDOPHILUS PO) Take by mouth daily    Historical Provider, MD   gabapentin (NEURONTIN) 300 MG capsule Take 300 mg by mouth in the morning.     Historical Provider, MD   acetaminophen (TYLENOL) 325 MG tablet Take 650 mg by mouth every 6 hours as needed for Pain    Historical Provider, MD   carvedilol (COREG) 3.125 MG tablet Take 1 tablet by mouth 2 times daily 6/15/22 1/18/23  AFSANEH Westfall CNP   melatonin 10 MG CAPS capsule Take 10 mg by mouth nightly    Historical Provider, MD   ReliOn Lancets Micro-Thin 33G MISC USE AS DIRECTED EVERY DAY 1/28/22   Historical Provider, MD   HUMALOG 100 UNIT/ML injection vial Inject into the skin 3 times daily (before meals) Indications: 15 units and sliding scale (patient reports only the sliding scale)  11/29/21   Historical Provider, MD   Lancets MISC 1 each by Does not apply route daily 11/30/21   AFSANEH Westfall CNP   docusate sodium (COLACE) 100 MG capsule Take 1 capsule by mouth 2 times daily 11/22/21   Petrina Kehr, MD   aspirin 81 MG chewable tablet Take 1 tablet by mouth daily 9/25/21   Yury Babb MD   allopurinol (ZYLOPRIM) 100 MG tablet Take 1 tablet by mouth daily 4/14/21   Maris Peñajodee, APRN - CNP       Allergies:  Adhesive tape, Metformin and related, Ace inhibitors, Iv dye [iodides], Nsaids, and Metformin and related    Social History:   reports that she has never smoked. She has never used smokeless tobacco. She reports that she does not drink alcohol and does not use drugs. Family History: family history includes Diabetes in her father; Heart Failure in her father. REVIEW OF SYSTEMS:    Constitutional: Reports fatigue and tired  Eyes: No visual changes or diplopia. No scleral icterus. ENT: No Headaches  Cardiovascular: Reports exertional shortness of breath and chest pressure, reports orthopnea  Respiratory: No previous pulmonary problems, No cough  Gastrointestinal: No abdominal pain. No change in bowel or bladder habits. Genitourinary: No dysuria, trouble voiding, or hematuria. Musculoskeletal:  No gait disturbance, No weakness or joint complaints. Neurological: Denies any      PHYSICAL EXAM:      /74   Pulse 77   Temp 97.3 °F (36.3 °C) (Oral)   Resp 20   Ht 5' 5\" (1.651 m)   Wt 244 lb (110.7 kg)   SpO2 90%   BMI 40.60 kg/m²    Constitutional and General Appearance: alert, cooperative, no distress  HEENT: PERRL  Respiratory:  Clear to auscultation  Cardiovascular:  Normal heart sounds  Abdomen:   Soft  Extremities:   Lower extremity edema: yes  Neurological:  Alert and oriented. Moves all extremities well    DATA:    Diagnostics:    EKG: normal sinus rhythm, did not show any significant ischemic changes  ECHO: previously taken August 2022 and showed EF of greater than 55%. Stress Test: not obtained. Cardiac Angiography: not obtained.     Labs:     CBC:   Recent Labs     01/25/23  1059 01/26/23  0644   WBC 6.8 7.8   HGB 12.9 12.5   HCT 41.2 41.7    181     BMP:   Recent Labs     01/25/23  1059 01/25/23  1452 01/25/23  1920 01/26/23  0644     --   --  139   K 4.0  --   --  4.0   CO2 22  --   --  22   BUN 31* --   --  30*   CREATININE 2.12*  --   --  1.99*   LABGLOM 26*  --   --  28*   GLUCOSE 102*   < > 94 162*    < > = values in this interval not displayed. BNP: No results for input(s): BNP in the last 72 hours. PT/INR:   Recent Labs     01/25/23  1059 01/26/23  0644   PROTIME 11.3 10.7   INR 1.1 1.0     APTT:No results for input(s): APTT in the last 72 hours. CARDIAC ENZYMES:No results for input(s): CKTOTAL, CKMB, CKMBINDEX, TROPONINI in the last 72 hours. FASTING LIPID PANEL:  Lab Results   Component Value Date/Time    HDL 43 04/09/2015 12:00 AM    LDLCALC 28 04/09/2015 12:00 AM    TRIG 168 04/09/2015 12:00 AM     LIVER PROFILE:No results for input(s): AST, ALT, LABALBU in the last 72 hours.     IMPRESSION:    Acute on chronic hypoxic respiratory failure  Acute on chronic CHF exacerbation  Hypertensive urgency  CKD  Type 2 diabetes mellitus  CAD status post CABG  Obesity  History of DVT    RECOMMENDATIONS:  Continue aspirin and statin  Continue beta-blocker  Continue diuresis  Wean off nitro as tolerated  Resume home medication Ranexa  Final recommendations to follow after discussing with attending    Martin Farley  PGY-2  Internal Medicine  [facility]   9:48 AM 1/26/2023

## 2023-01-26 NOTE — PROGRESS NOTES
SPIRITUAL CARE DEPARTMENT - [address]  PROGRESS NOTE    Shift date: 1.25.2023  Shift day: Wednesday   Shift # 2    Room # 21/21   Name: Jessie Pennington                Uats[place]: unknown   Place of Church: unknown    Referral: Routine Visit    Admit Date & Time: 1/25/2023 10:13 AM    Assessment:  Jessie Pennington is a 57 y.o. female in the hospital. Upon entering the room writer observes the patient calm and coping. Patient provided hospitality and support. Receptive to care. Intervention:  Writer introduced self and title as  Writer offered space for the patient  to express feelings, needs, and concerns and provided a ministry presence. Outcome:  Patient appears to be calm and coping at this time    Plan:  Chaplains will remain available to offer spiritual and emotional support as needed.       Electronically signed by Chirag Edwards on 1/26/2023 at 12:04 AM.  Crow Cheney  [phone]       01/25/23 2215   Encounter Summary   Service Provided For: Patient   Referral/Consult From: [facility] Family members   Last Encounter  01/25/23   Complexity of Encounter Moderate   Begin Time 2215   End Time  2230   Total Time Calculated 15 min   Encounter    Type Initial Screen/Assessment   Assessment/Intervention/Outcome   Assessment Calm;Coping   Intervention Active listening;Explored/Affirmed feelings, thoughts, concerns   Outcome Coping;Expressed Gratitude     Electronically signed by Alesia Santo on 1/26/2023 at 12:04 AM

## 2023-01-26 NOTE — PROGRESS NOTES
[facility]  Office: [address], DO, Damari Pasquale, DO, Mariela Li, DO, Ellyn Doll Jairo, DO, Macy Blunt MD, Narciso Adames MD, Katia Sal MD, Sebastian Diallo MD,  Troy Rivera MD, Ady Hammond MD, Cedrick Rockwell, DO, Mo Hollis MD,  Felicia Mills MD, Ana Ball MD, Oliverio Goss DO, Verónica Barth MD, Brent Babinski, MD, El Dickinson, DO, Jesus Duron MD, Anai Jett MD, Kassie Cerda MD, Uday Ding MD, Ila Ramirez DO, Rios Moya MD, Bar Vieira MD, Mili Vann, Shawnee Samuel, CNP, Victor Hugo Triplett, CNP, Coco Lawton, CNP,  Andres Yanes, DNP, Jie Richardson, CNP, Brenna Chiu, CNP, Lanny Waldron CNP, Grady Herbert, CNP, Yo Gay CNP, Miguel A Allan PA-C, Tri Villatoro, CNS, Coral Horan, CNP, Jordon Blake, CNP         [address]    Progress Note    1/26/2023    7:32 AM    Name:   Anibal Pinzon  MRN:     [id number]     Acct:      [de-identified]   Room:   [address] [place] [address] Day:  1  Admit Date:  1/25/2023 10:13 AM    PCP:   AFSANEH Torres CNP  Code Status:  Full Code    Subjective:     C/C:   Chief Complaint   Patient presents with    Shortness of Breath    Weight Gain     Interval History Status: improved. Vitals reviewed, afebrile and hemodynamically stable. Currently saturating well on 2 L nasal cannula. Labs reviewed, creatinine stable 1.99 today. Overnight patient had no significant events. On examination patient resting comfortably in bed. Complains of chest pressure intermittently for the past week with radiation to her back. Chest pain is reproducible on examination but states it is not the same pain as her pressure. Patient had elevated troponin above baseline on admission as well as worsening CHF. Cardiology was consulted with plan for echocardiogram.  Wean off nitro infusion.     Brief History:     Tahira Easley New Chris is a 57 y.o. Non- / non  female who presents with Shortness of Breath and Weight Gain   and is admitted to the hospital for the management of Acute respiratory distress. 57year-old female with known medical history of diastolic heart failure, CKD, hypertension, hyperlipidemia, type 2 diabetes mellitus, coronary artery disease with history of CABG presents to the hospital with worsening shortness of breath and leg swelling for last 1 month. Patient states that she was on dialysis after she got contrast for a CT PE scan and was recently taken off dialysis in August 2022 by Dr. Cora Cornelius. Her kidney numbers have remained stable after getting off dialysis. Patient reports that she started noticing increasing swelling of her lower extremities and increasing shortness of breath on minimal exertion. Patient followed up with her PCP who advised patient to get chest x-ray done which showed congestion. Patient was then asked by PCP to come to the hospital.     In the ER patient was noted to be hypertensive with blood pressure 186/88, she was hypoxic saturating 78% on room air. She was initially started on nonrebreather and then switched to BiPAP. On my examination patient was on BiPAP. She was also started on nitro drip for hypertension and pulmonary edema. Her creatinine is around 2.12. Troponin of 82 and 83. Review of Systems:     Constitutional:  negative for chills, fevers, sweats  Respiratory: Positive for cough, dyspnea on exertion, shortness of breath, wheezing  Cardiovascular: Positive for chest pain and lower extremity edema. Negative for palpitations  Gastrointestinal:  negative for abdominal pain, constipation, diarrhea, nausea, vomiting  Neurological:  negative for dizziness, headache    Medications: Allergies:     Allergies   Allergen Reactions    Adhesive Tape Other (See Comments) and Rash     Other reaction(s): Unknown    Metformin And Related Diarrhea    Ace Inhibitors Other (See Comments)     Cough, states not good for her kidneys    Iv Dye [Iodides]      Stage 4 kidney disease    Nsaids      CANNOT TAKE D/T KIDNEY DISEASE    Metformin And Related Hives, Itching and Rash     Stage 4 kidney disease. Current Meds:   Scheduled Meds:    aspirin  81 mg Oral Daily    atorvastatin  40 mg Oral Nightly    bumetanide  2 mg IntraVENous BID    gabapentin  300 mg Oral Daily    tamsulosin  0.4 mg Oral Daily    sodium bicarbonate  1,300 mg Oral BID    pantoprazole  40 mg Oral QAM AC    sodium chloride flush  5-40 mL IntraVENous 2 times per day    heparin (porcine)  5,000 Units SubCUTAneous 3 times per day    insulin glargine  55 Units SubCUTAneous BID    carvedilol  25 mg Oral BID    amLODIPine  10 mg Oral Daily     Continuous Infusions:    nitroGLYCERIN 30 mcg/min (01/26/23 0616)    sodium chloride       PRN Meds: melatonin, melatonin, sodium chloride flush, sodium chloride, potassium chloride **OR** potassium alternative oral replacement **OR** potassium chloride, magnesium sulfate, ondansetron **OR** ondansetron, polyethylene glycol, acetaminophen **OR** acetaminophen, labetalol, hydrALAZINE    Data:     Past Medical History:   has a past medical history of Arthritis, Backache, unspecified, Cerebral artery occlusion with cerebral infarction ([facility] CHF (congestive heart failure) ([facility] Chronic kidney disease, Coronary atherosclerosis of artery bypass graft, COVID, Cramp of limb, Gallstones, Hemodialysis patient ([facility] Hyperlipidemia, Hypertension, Insomnia, Neuromuscular disorder ([facility] Pneumonia, Psychiatric problem, Thyroid disease, Type II or unspecified type diabetes mellitus with renal manifestations, not stated as uncontrolled(250.40), Type II or unspecified type diabetes mellitus without mention of complication, not stated as uncontrolled, and Unspecified vitamin D deficiency. Social History:   reports that she has never smoked.  She has never used smokeless tobacco. She reports that she does not drink alcohol and does not use drugs. Family History:   Family History   Problem Relation Age of Onset    Diabetes Father     Heart Failure Father        Vitals:  BP (!) 146/77   Pulse 71   Temp 97 °F (36.1 °C) (Temporal)   Resp 15   Ht 5' 5\" (1.651 m)   Wt 244 lb (110.7 kg)   SpO2 93%   BMI 40.60 kg/m²   Temp (24hrs), Av.7 °F (36.5 °C), Min:97 °F (36.1 °C), Max:98.1 °F (36.7 °C)    Recent Labs     23  14523   POCGLU 84 94       I/O (24Hr):     Intake/Output Summary (Last 24 hours) at 2023 0732  Last data filed at 2023 0003  Gross per 24 hour   Intake --   Output 1200 ml   Net -1200 ml       Labs:  Hematology:  Recent Labs     23  1059 23  0644   WBC 6.8 7.8   RBC 4.14 3.97   HGB 12.9 12.5   HCT 41.2 41.7   MCV 99.5 105.0*   MCH 31.2 31.5   MCHC 31.3 30.0   RDW 16.9* 16.5*    181   MPV 10.2 10.2   INR 1.1  --      Chemistry:  Recent Labs     23  1059 23  1214 23  1452 23     --   --   --   --    K 4.0  --   --   --   --    *  --   --   --   --    CO2 22  --   --   --   --    GLUCOSE 102*  --  82 94  --    BUN 31*  --   --   --   --    CREATININE 2.12*  --   --   --   --    ANIONGAP 11  --   --   --   --    LABGLOM 26*  --   --   --   --    CALCIUM 8.9  --   --   --   --    PROBNP 2,135*  --   --   --   --    TROPHS 82* 83*  --   --  72*     Recent Labs     23  1451 01/25/23  1914   POCGLU 84 94     ABG:  Lab Results   Component Value Date/Time    PHART 7.429 2022 01:42 PM    OTI9IJU 45.9 2022 01:42 PM    PO2ART 47.6 2022 01:42 PM    JNB4HFT 30.4 2022 01:42 PM    PBEA 6.1 2022 01:42 PM    I8SJRSBM 82.8 2022 01:42 PM    FIO2 INFORMATION NOT PROVIDED 2023 10:59 AM     Lab Results   Component Value Date/Time    SPECIAL RIGHT HAND 2ML 2022 02:22 AM     Lab Results   Component Value Date/Time    CULTURE NO SIGNIFICANT GROWTH 01/10/2023 02:14 PM       Radiology:  XR CHEST (2 VW)    Result Date: 1/24/2023  Findings suggest congestive heart failure The findings were sent to the Radiology Results [address] at 5:31 pm on 1/24/2023 to be communicated to a licensed caregiver. XR CHEST PORTABLE    Result Date: 1/25/2023  Appearance favoring worsening Wes Gell vascular congestion/interstitial edema. Physical Examination:        General appearance:  alert, cooperative and no distress  Mental Status:  oriented to person, place and time and normal affect  Lungs: Rales in bases bilaterally, no wheezing, normal effort  Heart:  regular rate and rhythm, no murmur  Abdomen:  soft, nontender, nondistended, bowel sounds are present. Extremities: 1+ pitting bilateral lower extremity edema, no redness, no tenderness in the calves  Skin:  no gross lesions, rashes, induration    Assessment:        Hospital Problems             Last Modified POA    * (Principal) Acute respiratory distress 1/25/2023 Yes    Type 2 diabetes mellitus with hyperglycemia, with long-term current use of insulin ([address] 1/25/2023 Yes    Hypertensive urgency 1/25/2023 Yes    Hypoxia 1/25/2023 Yes    Atherosclerosis of coronary artery bypass graft of native heart without angina pectoris 1/25/2023 Yes    Acute on chronic diastolic heart failure ([address] 1/25/2023 Yes    Diabetic polyneuropathy associated with type 2 diabetes mellitus ([address] 1/25/2023 Yes    History of coronary artery bypass graft 1/25/2023 Yes    Mixed hyperlipidemia 1/25/2023 Yes    Stage 4 chronic kidney disease ([address] 1/25/2023 Yes    Morbid obesity with BMI of 40.0-44.9, adult ([address] 1/25/2023 Yes     Plan:        Acute respiratory failure with hypoxia. Secondary to #2. Currently saturating well on 2 L nasal cannula and patient states she uses 3 L intermittently at home as needed. Acute on chronic diastolic congestive heart failure.   Echocardiogram reviewed from 8/2/2022 with EF 55% and no obvious wall motion abnormalities. Wean off nitro infusion. Continue Bumex 2 mg IV twice daily today and transition to home Lasix tomorrow, Coreg 25 mg twice daily. Cardiology was consulted and patient was started on isosorbide dinitrate 10 mg 3 times daily. Elevated troponin. Troponin mildly elevated above baseline with complaints of chest pressure. Cardiology consulted. Plan for echocardiogram.    Hypertensive urgency. Resolved. Continue amlodipine 10 mg daily, Coreg 25 mg twice daily. Stop nitro infusion. Stage IV CKD. Follows outpatient with Dr. Tanis Krabbe. Creatinine stable. Nephrology following due to patient's history of requiring hemodialysis in the past.    Type 2 diabetes mellitus. Hemoglobin A1c 7.1 1/19/2023. Continue Lantus 25 units twice daily for now (patient takes Lantus 55 units twice daily at home). POCT glucose and hypoglycemia protocol. Hyperlipidemia. Continue Lipitor 40 mg nightly. CAD status post CABG. continue aspirin 81 mg daily, Lipitor 40 mg nightly, Coreg 25 mg twice daily, Ranexa 100 mg daily. Morbid obesity. Encourage lifestyle modification with diet and exercise. DVT prophylaxis: Heparin 5000 units subcutaneously every 8 hours. GI prophylaxis: Protonix 40 mg daily. Discharge planning: Patient was initially on BiPAP on the emergency department yesterday has been diuresed well with Bumex 2 mg IV twice daily. We will continue Bumex 2 mg IV twice daily through the rest the day and transition to Lasix tomorrow. Cardiology was consulted due to chest pain and elevated troponin we will get echocardiogram.  Discharge pending findings.     Uyen Lorenz DO  1/26/2023  7:32 AM

## 2023-01-26 NOTE — PROGRESS NOTES
Congestive Heart Failure Education completed and charted. CHF booklet given. Patient was receptive to education. Discussed the  importance of medication compliance. Discussed the importance of a heart healthy diet. Discussed 2000 mg sodium-restricted daily diet. Patient instructed to limit fluid intake to  1.5  to 1.6 liters per day, per Nephrology note and status board. Pt advised to fully understand fluid limit per her Nephrology provider at time of d/c. Currently, while admitted, she has a 1.5 L restriction. Her current , favorite, water bottle volume is discussed. Patient instructed to weigh self at the same time of each day each morning, reinforced teaching to monitor for 3-5 lb weight increase over 1-2 days notify physician if change noted. Signs and symptoms of CHF discussed with patient, such as feeling more tired than normal, feeling short of breath, coughing that increases when lying down, sudden weight gain, swelling of the feet, legs or belly. Patient verbalized understanding to notify physician office if these symptoms occur. 8/2022 EF 55%   Additional Echo is pending per Epic. Patient refusing referral to  CHF Clinic today, stating she has excellent support from family and PCP.

## 2023-01-26 NOTE — ED NOTES
Patient to inpatient unit via ED stretcher. Patient on transport monitor. Transported by Raven Linares.       Gabi Franz RN  01/26/23 6799

## 2023-01-27 ENCOUNTER — APPOINTMENT (OUTPATIENT)
Dept: GENERAL RADIOLOGY | Age: 63
DRG: 291 | End: 2023-01-27
Payer: COMMERCIAL

## 2023-01-27 LAB
ABSOLUTE EOS #: 0.12 K/UL (ref 0–0.4)
ABSOLUTE IMMATURE GRANULOCYTE: 0 K/UL (ref 0–0.3)
ABSOLUTE LYMPH #: 0.58 K/UL (ref 1–4.8)
ABSOLUTE MONO #: 0.41 K/UL (ref 0.1–0.8)
ANION GAP SERPL CALCULATED.3IONS-SCNC: 11 MMOL/L (ref 9–17)
BASOPHILS # BLD: 0 % (ref 0–2)
BASOPHILS ABSOLUTE: 0 K/UL (ref 0–0.2)
BUN BLDV-MCNC: 34 MG/DL (ref 8–23)
CALCIUM SERPL-MCNC: 8.6 MG/DL (ref 8.6–10.4)
CHLORIDE BLD-SCNC: 107 MMOL/L (ref 98–107)
CO2: 23 MMOL/L (ref 20–31)
CREAT SERPL-MCNC: 2.24 MG/DL (ref 0.5–0.9)
EKG ATRIAL RATE: 65 BPM
EKG P AXIS: 62 DEGREES
EKG P-R INTERVAL: 164 MS
EKG Q-T INTERVAL: 476 MS
EKG QRS DURATION: 96 MS
EKG QTC CALCULATION (BAZETT): 495 MS
EKG R AXIS: 45 DEGREES
EKG T AXIS: 8 DEGREES
EKG VENTRICULAR RATE: 65 BPM
EOSINOPHILS RELATIVE PERCENT: 2 % (ref 1–4)
GFR SERPL CREATININE-BSD FRML MDRD: 24 ML/MIN/1.73M2
GLUCOSE BLD-MCNC: 119 MG/DL (ref 65–105)
GLUCOSE BLD-MCNC: 178 MG/DL (ref 70–99)
GLUCOSE BLD-MCNC: 179 MG/DL (ref 65–105)
GLUCOSE BLD-MCNC: 262 MG/DL (ref 65–105)
GLUCOSE BLD-MCNC: 380 MG/DL (ref 65–105)
HCT VFR BLD CALC: 37.7 % (ref 36.3–47.1)
HEMOGLOBIN: 11.7 G/DL (ref 11.9–15.1)
IMMATURE GRANULOCYTES: 0 %
LV EF: 53 %
LVEF MODALITY: NORMAL
LYMPHOCYTES # BLD: 10 % (ref 24–44)
MAGNESIUM: 2 MG/DL (ref 1.6–2.6)
MCH RBC QN AUTO: 31.1 PG (ref 25.2–33.5)
MCHC RBC AUTO-ENTMCNC: 31 G/DL (ref 28.4–34.8)
MCV RBC AUTO: 100.3 FL (ref 82.6–102.9)
MONOCYTES # BLD: 7 % (ref 1–7)
MORPHOLOGY: ABNORMAL
NRBC AUTOMATED: 0 PER 100 WBC
PDW BLD-RTO: 15.9 % (ref 11.8–14.4)
PHOSPHORUS: 4 MG/DL (ref 2.6–4.5)
PLATELET # BLD: 157 K/UL (ref 138–453)
PMV BLD AUTO: 10.8 FL (ref 8.1–13.5)
POTASSIUM SERPL-SCNC: 4.3 MMOL/L (ref 3.7–5.3)
RBC # BLD: 3.76 M/UL (ref 3.95–5.11)
SEG NEUTROPHILS: 81 % (ref 36–66)
SEGMENTED NEUTROPHILS ABSOLUTE COUNT: 4.69 K/UL (ref 1.8–7.7)
SODIUM BLD-SCNC: 141 MMOL/L (ref 135–144)
WBC # BLD: 5.8 K/UL (ref 3.5–11.3)

## 2023-01-27 PROCEDURE — 84100 ASSAY OF PHOSPHORUS: CPT

## 2023-01-27 PROCEDURE — 97166 OT EVAL MOD COMPLEX 45 MIN: CPT

## 2023-01-27 PROCEDURE — 6370000000 HC RX 637 (ALT 250 FOR IP): Performed by: CLINICAL NURSE SPECIALIST

## 2023-01-27 PROCEDURE — 6360000002 HC RX W HCPCS: Performed by: CLINICAL NURSE SPECIALIST

## 2023-01-27 PROCEDURE — 99233 SBSQ HOSP IP/OBS HIGH 50: CPT | Performed by: STUDENT IN AN ORGANIZED HEALTH CARE EDUCATION/TRAINING PROGRAM

## 2023-01-27 PROCEDURE — 93010 ELECTROCARDIOGRAM REPORT: CPT | Performed by: INTERNAL MEDICINE

## 2023-01-27 PROCEDURE — 2580000003 HC RX 258: Performed by: STUDENT IN AN ORGANIZED HEALTH CARE EDUCATION/TRAINING PROGRAM

## 2023-01-27 PROCEDURE — 6370000000 HC RX 637 (ALT 250 FOR IP): Performed by: STUDENT IN AN ORGANIZED HEALTH CARE EDUCATION/TRAINING PROGRAM

## 2023-01-27 PROCEDURE — 97535 SELF CARE MNGMENT TRAINING: CPT

## 2023-01-27 PROCEDURE — 93306 TTE W/DOPPLER COMPLETE: CPT

## 2023-01-27 PROCEDURE — 6370000000 HC RX 637 (ALT 250 FOR IP): Performed by: NURSE PRACTITIONER

## 2023-01-27 PROCEDURE — 94660 CPAP INITIATION&MGMT: CPT

## 2023-01-27 PROCEDURE — 6360000002 HC RX W HCPCS: Performed by: STUDENT IN AN ORGANIZED HEALTH CARE EDUCATION/TRAINING PROGRAM

## 2023-01-27 PROCEDURE — 71045 X-RAY EXAM CHEST 1 VIEW: CPT

## 2023-01-27 PROCEDURE — 99232 SBSQ HOSP IP/OBS MODERATE 35: CPT | Performed by: INTERNAL MEDICINE

## 2023-01-27 PROCEDURE — 80048 BASIC METABOLIC PNL TOTAL CA: CPT

## 2023-01-27 PROCEDURE — 2700000000 HC OXYGEN THERAPY PER DAY

## 2023-01-27 PROCEDURE — 2060000000 HC ICU INTERMEDIATE R&B

## 2023-01-27 PROCEDURE — 94640 AIRWAY INHALATION TREATMENT: CPT

## 2023-01-27 PROCEDURE — 36415 COLL VENOUS BLD VENIPUNCTURE: CPT

## 2023-01-27 PROCEDURE — 85025 COMPLETE CBC W/AUTO DIFF WBC: CPT

## 2023-01-27 PROCEDURE — 2500000003 HC RX 250 WO HCPCS

## 2023-01-27 PROCEDURE — 2500000003 HC RX 250 WO HCPCS: Performed by: STUDENT IN AN ORGANIZED HEALTH CARE EDUCATION/TRAINING PROGRAM

## 2023-01-27 PROCEDURE — 82947 ASSAY GLUCOSE BLOOD QUANT: CPT

## 2023-01-27 PROCEDURE — 6370000000 HC RX 637 (ALT 250 FOR IP)

## 2023-01-27 PROCEDURE — 94761 N-INVAS EAR/PLS OXIMETRY MLT: CPT

## 2023-01-27 PROCEDURE — 83735 ASSAY OF MAGNESIUM: CPT

## 2023-01-27 RX ORDER — GABAPENTIN 300 MG/1
300 CAPSULE ORAL NIGHTLY
Status: DISCONTINUED | OUTPATIENT
Start: 2023-01-27 | End: 2023-02-06 | Stop reason: HOSPADM

## 2023-01-27 RX ORDER — DEXAMETHASONE SODIUM PHOSPHATE 4 MG/ML
10 INJECTION, SOLUTION INTRA-ARTICULAR; INTRALESIONAL; INTRAMUSCULAR; INTRAVENOUS; SOFT TISSUE ONCE
Status: COMPLETED | OUTPATIENT
Start: 2023-01-27 | End: 2023-01-27

## 2023-01-27 RX ORDER — IPRATROPIUM BROMIDE AND ALBUTEROL SULFATE 2.5; .5 MG/3ML; MG/3ML
1 SOLUTION RESPIRATORY (INHALATION)
Status: DISCONTINUED | OUTPATIENT
Start: 2023-01-27 | End: 2023-02-06 | Stop reason: HOSPADM

## 2023-01-27 RX ORDER — DIPHENHYDRAMINE HYDROCHLORIDE 50 MG/ML
50 INJECTION INTRAMUSCULAR; INTRAVENOUS ONCE
Status: COMPLETED | OUTPATIENT
Start: 2023-01-27 | End: 2023-01-27

## 2023-01-27 RX ORDER — BUMETANIDE 0.25 MG/ML
2 INJECTION, SOLUTION INTRAMUSCULAR; INTRAVENOUS ONCE
Status: COMPLETED | OUTPATIENT
Start: 2023-01-27 | End: 2023-01-27

## 2023-01-27 RX ORDER — METHYLPREDNISOLONE SODIUM SUCCINATE 40 MG/ML
40 INJECTION, POWDER, LYOPHILIZED, FOR SOLUTION INTRAMUSCULAR; INTRAVENOUS ONCE
Status: DISCONTINUED | OUTPATIENT
Start: 2023-01-27 | End: 2023-01-27

## 2023-01-27 RX ADMIN — BUMETANIDE 2 MG: 0.25 INJECTION INTRAMUSCULAR; INTRAVENOUS at 21:18

## 2023-01-27 RX ADMIN — ASPIRIN 81 MG: 81 TABLET, CHEWABLE ORAL at 07:58

## 2023-01-27 RX ADMIN — IPRATROPIUM BROMIDE AND ALBUTEROL SULFATE 1 AMPULE: 2.5; .5 SOLUTION RESPIRATORY (INHALATION) at 17:07

## 2023-01-27 RX ADMIN — DEXAMETHASONE SODIUM PHOSPHATE 10 MG: 4 INJECTION, SOLUTION INTRAMUSCULAR; INTRAVENOUS at 10:07

## 2023-01-27 RX ADMIN — IPRATROPIUM BROMIDE AND ALBUTEROL SULFATE 1 AMPULE: 2.5; .5 SOLUTION RESPIRATORY (INHALATION) at 11:50

## 2023-01-27 RX ADMIN — BUMETANIDE 2 MG: 0.25 INJECTION INTRAMUSCULAR; INTRAVENOUS at 15:59

## 2023-01-27 RX ADMIN — BUMETANIDE 2 MG: 0.25 INJECTION INTRAMUSCULAR; INTRAVENOUS at 10:11

## 2023-01-27 RX ADMIN — PANTOPRAZOLE SODIUM 40 MG: 40 TABLET, DELAYED RELEASE ORAL at 06:37

## 2023-01-27 RX ADMIN — IPRATROPIUM BROMIDE AND ALBUTEROL SULFATE 1 AMPULE: 2.5; .5 SOLUTION RESPIRATORY (INHALATION) at 21:22

## 2023-01-27 RX ADMIN — SODIUM CHLORIDE, PRESERVATIVE FREE 10 ML: 5 INJECTION INTRAVENOUS at 20:54

## 2023-01-27 RX ADMIN — INSULIN LISPRO 4 UNITS: 100 INJECTION, SOLUTION INTRAVENOUS; SUBCUTANEOUS at 17:31

## 2023-01-27 RX ADMIN — INSULIN GLARGINE 40 UNITS: 100 INJECTION, SOLUTION SUBCUTANEOUS at 20:50

## 2023-01-27 RX ADMIN — INSULIN LISPRO 4 UNITS: 100 INJECTION, SOLUTION INTRAVENOUS; SUBCUTANEOUS at 20:50

## 2023-01-27 RX ADMIN — CARVEDILOL 25 MG: 25 TABLET, FILM COATED ORAL at 07:58

## 2023-01-27 RX ADMIN — CARVEDILOL 25 MG: 25 TABLET, FILM COATED ORAL at 20:50

## 2023-01-27 RX ADMIN — HYDRALAZINE HYDROCHLORIDE 10 MG: 20 INJECTION INTRAMUSCULAR; INTRAVENOUS at 23:15

## 2023-01-27 RX ADMIN — IPRATROPIUM BROMIDE AND ALBUTEROL SULFATE 1 AMPULE: 2.5; .5 SOLUTION RESPIRATORY (INHALATION) at 09:54

## 2023-01-27 RX ADMIN — TAMSULOSIN HYDROCHLORIDE 0.4 MG: 0.4 CAPSULE ORAL at 07:57

## 2023-01-27 RX ADMIN — AMLODIPINE BESYLATE 10 MG: 10 TABLET ORAL at 07:58

## 2023-01-27 RX ADMIN — Medication 10 MG: at 22:04

## 2023-01-27 RX ADMIN — ISOSORBIDE DINITRATE 10 MG: 10 TABLET ORAL at 07:58

## 2023-01-27 RX ADMIN — HEPARIN SODIUM 5000 UNITS: 5000 INJECTION INTRAVENOUS; SUBCUTANEOUS at 07:57

## 2023-01-27 RX ADMIN — ATORVASTATIN CALCIUM 40 MG: 80 TABLET, FILM COATED ORAL at 20:50

## 2023-01-27 RX ADMIN — RANOLAZINE 1000 MG: 500 TABLET, FILM COATED, EXTENDED RELEASE ORAL at 07:58

## 2023-01-27 RX ADMIN — GABAPENTIN 300 MG: 300 CAPSULE ORAL at 21:18

## 2023-01-27 RX ADMIN — HEPARIN SODIUM 5000 UNITS: 5000 INJECTION INTRAVENOUS; SUBCUTANEOUS at 16:48

## 2023-01-27 RX ADMIN — SODIUM BICARBONATE 1300 MG: 648 TABLET ORAL at 20:50

## 2023-01-27 RX ADMIN — DOCUSATE SODIUM 100 MG: 100 CAPSULE ORAL at 20:50

## 2023-01-27 RX ADMIN — DIPHENHYDRAMINE HYDROCHLORIDE 50 MG: 50 INJECTION, SOLUTION INTRAMUSCULAR; INTRAVENOUS at 10:29

## 2023-01-27 RX ADMIN — GABAPENTIN 300 MG: 300 CAPSULE ORAL at 07:58

## 2023-01-27 RX ADMIN — SODIUM BICARBONATE 1300 MG: 648 TABLET ORAL at 07:57

## 2023-01-27 RX ADMIN — INSULIN GLARGINE 40 UNITS: 100 INJECTION, SOLUTION SUBCUTANEOUS at 08:07

## 2023-01-27 RX ADMIN — SODIUM CHLORIDE, PRESERVATIVE FREE 10 ML: 5 INJECTION INTRAVENOUS at 07:58

## 2023-01-27 RX ADMIN — DOCUSATE SODIUM 100 MG: 100 CAPSULE ORAL at 07:58

## 2023-01-27 ASSESSMENT — PAIN SCALES - GENERAL
PAINLEVEL_OUTOF10: 0
PAINLEVEL_OUTOF10: 0

## 2023-01-27 ASSESSMENT — ENCOUNTER SYMPTOMS: SHORTNESS OF BREATH: 1

## 2023-01-27 NOTE — PROGRESS NOTES
Physical Therapy        Physical Therapy Cancel Note      DATE: 2023    NAME: Bijal Rodriguez  MRN: [id number]   : 1960      Patient not seen this date for Physical Therapy due to:    Patient Declined: Pt refusing to participate in PT at this time, Stating \"I'm not feeling well. \" Will check back as able.        Electronically signed by Tomi Mcgraw PTA on 2023 at 1:16 PM

## 2023-01-27 NOTE — PLAN OF CARE
Critical care asked to evaluate pt after possible allergic reaction to Isordil. Pt had increased hoarseness of voice, difficulty moving air and itching after administration of medication. Pt received Decadron 10mg and Benadryl 50mg. Inially pt was having very shallow breathing and speaking in short sentences. Upon my evaluation pt had no wheezing, maintain saturations, no oropharyngeal or tongue swelling, pt was protecting her airway and maintaining her secretions without any complications and was able to speak in full sentences.      Plan:  Pt to remain on the floor as symptoms have improved and she is protecting her airway  If there is concern that pt is not protecting airway please call critical care   Plan discussed with Dr. Jill Smith MD

## 2023-01-27 NOTE — PROGRESS NOTES
[facility]  Progress Note                   Date:   1/27/2023  Patient name: Tameka Romero  Date of admission:  1/25/2023 10:13 AM  MRN:   [id number]  YOB: 1960  PCP: AFSANEH Baker CNP    Reason for Admission: Acute respiratory distress [R06.03]  Hypoxia [R09.02]  Congestive heart failure, unspecified HF chronicity, unspecified heart failure type ([address] [I50.9]    Subjective:       Clinical Changes /Abnormalities: Patient seen and examined in room, laying in bed. She denies chest pain. She states she could not breath yesterday and today breathing has improved dramatically, on O2 via NC without distress. Labs, vitals, tele reviewed, SR/SB.      Review of Systems    Medications:   Scheduled Meds:   ipratropium-albuterol  1 ampule Inhalation Q4H WA    docusate sodium  100 mg Oral BID    ranolazine  1,000 mg Oral Daily    [Held by provider] isosorbide dinitrate  10 mg Oral TID    insulin lispro  0-8 Units SubCUTAneous TID WC    insulin lispro  0-4 Units SubCUTAneous Nightly    insulin glargine  40 Units SubCUTAneous BID    aspirin  81 mg Oral Daily    atorvastatin  40 mg Oral Nightly    bumetanide  2 mg IntraVENous BID    gabapentin  300 mg Oral Daily    tamsulosin  0.4 mg Oral Daily    sodium bicarbonate  1,300 mg Oral BID    pantoprazole  40 mg Oral QAM AC    sodium chloride flush  5-40 mL IntraVENous 2 times per day    heparin (porcine)  5,000 Units SubCUTAneous 3 times per day    carvedilol  25 mg Oral BID    amLODIPine  10 mg Oral Daily     Continuous Infusions:   dextrose      sodium chloride       CBC:   Recent Labs     01/25/23  1059 01/26/23  0644 01/27/23  1035   WBC 6.8 7.8 5.8   HGB 12.9 12.5 11.7*    181 157     BMP:    Recent Labs     01/25/23  1059 01/25/23  1452 01/25/23  1920 01/26/23  0644 01/27/23  0309     --   --  139 141   K 4.0  --   --  4.0 4.3   *  --   --  107 107   CO2 22  --   --  22 23   BUN 31*  --   --  30* 34*   CREATININE 2.12*  --   --  1.99* 2.24*   GLUCOSE 102*   < > 94 162* 178*    < > = values in this interval not displayed. Hepatic:No results for input(s): AST, ALT, ALB, BILITOT, ALKPHOS in the last 72 hours. Troponin:   Recent Labs     01/25/23  1059 01/25/23  1214 01/25/23  1927   TROPHS 82* 83* 72*     BNP: No results for input(s): BNP in the last 72 hours. Lipids: No results for input(s): CHOL, HDL in the last 72 hours. Invalid input(s): LDLCALCU  INR:   Recent Labs     01/25/23  1059 01/26/23  0644   INR 1.1 1.0     Diagnostics:    EKG: normal sinus rhythm, did not show any significant ischemic changes  ECHO: previously taken August 2022 and showed EF of greater than 55%. Stress Test: not obtained. Cardiac Angiography: not obtained. Objective:   Vitals: BP (!) 155/75   Pulse 62   Temp 97.6 °F (36.4 °C) (Temporal)   Resp (!) 33   Ht 5' 5\" (1.651 m)   Wt 252 lb 6.8 oz (114.5 kg)   SpO2 96%   BMI 42.01 kg/m²   General appearance: alert and cooperative with exam  HEENT: Head: Normocephalic, no lesions, without obvious abnormality.   Neck:no JVD, trachea midline, no adenopathy  Lungs: Clear to auscultation  Heart: Regular rate and rhythm, s1/s2 auscultated, no murmurs  Abdomen: soft, non-tender, bowel sounds active  Extremities: 1-2+ edema  Neurologic: not done        Assessment / Acute Cardiac Problems:   Acute on chronic hypoxic respiratory failure  Acute on chronic CHF exacerbation  Hypertensive urgency  CKD  Type 2 diabetes mellitus  CAD status post CABG  Obesity  History of DVT    Patient Active Problem List:     Atherosclerosis of coronary artery bypass graft of native heart without angina pectoris     Acute on chronic diastolic heart failure (HCC)     Diabetic polyneuropathy associated with type 2 diabetes mellitus (HCC)     History of coronary artery bypass graft     Iron deficiency anemia     Spinal stenosis of lumbar region with neurogenic claudication     Mixed hyperlipidemia     Stage 4 chronic kidney disease (HCC)     Type 2 diabetes 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, with long-term current use of insulin (HCC)     Obesity, Class II, BMI 35-39.9     Thyroid nodule greater than or equal to 1 cm in diameter incidentally noted on imaging study     Essential hypertension     Chronic ischemic heart disease     Ischemic stroke of frontal lobe (HCC)     Morbid obesity with BMI of 40.0-44.9, adult (HCC)     Disequilibrium syndrome     Generalized weakness     Long-term memory loss     Muscle right arm weakness     Anxiety     Chronic midline low back pain with bilateral sciatica     AMS (altered mental status)     Tremor     COVID     ESRD (end stage renal disease) ([address]     Diabetic ketoacidosis without coma associated with type 2 diabetes mellitus ([address]     Hypokalemia     Confusion     Myoclonic jerking     Hyperglycemia     Cerebral hypoperfusion     Spasm of muscle     Low back pain     Muscle spasm     Immature arteriovenous fistula (HCC)     History of fusion of cervical spine     Depression with anxiety     Vancomycin resistant Enterococcus UTI     ESRD (end stage renal disease) ([address]     Dialysis disequilibrium syndrome     Cystitis     VRE infection (vancomycin resistant Enterococcus)     Infection due to ESBL-producing Klebsiella pneumoniae     Class 2 severe obesity due to excess calories with serious comorbidity and body mass index (BMI) of 35.0 to 35.9 in adult [facility])     Type 2 diabetes mellitus with hyperglycemia, with long-term current use of insulin (HCC)     Disorientation     Metabolic encephalopathy     Right hemiparesis (HCC)     Ulcer of left foot, limited to breakdown of skin ([address]     Secondary hyperparathyroidism ([address]     Acute respiratory distress     Hypertensive urgency     Hypoxia     Congestive heart failure ([address]     Nephrotic range proteinuria      Plan of Treatment:   ECHO pending to assess LVEF.   CHF. Continue bumex.  Strict I&O's, compression stockings, daily weights and fluid restrictions. Nephrology on board, appreciate recommendations with volume management. CAD with H/o CABG. Continue ASA, and statin, BB, and CCB. Discussed with primary, Ranexa and Isosorbide dinitrate on hold due to possible allergy, laryngeal edema, and only new medications given.      Electronically signed by AFSANEH Hughes CNP on 1/27/2023 at 11:32 AM  [address]  [phone]

## 2023-01-27 NOTE — PROGRESS NOTES
Occupational Therapy  [facility] [address] [facility]  Occupational Therapy Initial Assessment      Name: Efrain Bob  : 1960  MRN: [id number]  Date of Service: 2023    Discharge Recommendations:  Patient would benefit from continued therapy after discharge, 24 hour supervision or assist  OT Equipment Recommendations  Other: AE // DME recommendations for DC are ongoing. Patient Diagnosis(es): The primary encounter diagnosis was Congestive heart failure, unspecified HF chronicity, unspecified heart failure type ([address] A diagnosis of Hypoxia was also pertinent to this visit. Past Medical History:  has a past medical history of Arthritis, Backache, unspecified, Cerebral artery occlusion with cerebral infarction ([address] CHF (congestive heart failure) ([address] Chronic kidney disease, Coronary atherosclerosis of artery bypass graft, COVID, Cramp of limb, Gallstones, Hemodialysis patient ([address] Hyperlipidemia, Hypertension, Insomnia, Neuromuscular disorder ([facility] Pneumonia, Psychiatric problem, Thyroid disease, Type II or unspecified type diabetes mellitus with renal manifestations, not stated as uncontrolled(250.40), Type II or unspecified type diabetes mellitus without mention of complication, not stated as uncontrolled, and Unspecified vitamin D deficiency. Past Surgical History:  has a past surgical history that includes Coronary artery bypass graft; Knee arthroscopy; Carpal tunnel release; Breast surgery; Tonsillectomy; Hand surgery; Ankle fracture surgery; Cholecystectomy, open (N/A); IR TUNNELED CVC PLACE WO SQ PORT/PUMP > 5 YEARS (2021); AV fistula creation (2021); Dialysis fistula creation (Left, 2021); other surgical history (2022); back surgery; Colonoscopy; eye surgery; fracture surgery; and Cardiac surgery. Assessment   Performance deficits / Impairments: Decreased functional mobility ; Decreased endurance;Decreased coordination;Decreased ADL status; Decreased balance;Decreased strength;Decreased safe awareness;Decreased high-level IADLs;Decreased cognition  Assessment: Pt previously functioned Independently / Mod I for all ADLs and Mobility. At this time, she has impairments including: impaired balance, decreased endurance, decreased strength, increased fatigue, decreased overall functional activity tolerance - which impact her ability to regain her PLOF. Pt would benefit from continued (acute and post-acute) OT services, in order to regain functional independence and return to PLOF. Prognosis: Fair  Decision Making: Medium Complexity  REQUIRES OT FOLLOW-UP: Yes  Activity Tolerance  Activity Tolerance: Patient limited by fatigue          Plan   Occupational Therapy Plan  Times Per Week: 3-5x/week  Current Treatment Recommendations: Strengthening, Balance training, Functional mobility training, Endurance training, Equipment evaluation, education, & procurement, Patient/Caregiver education & training, Safety education & training, Self-Care / ADL, Home management training, Cognitive/Perceptual training, Coordination training       Restrictions  Restrictions/Precautions  Restrictions/Precautions: Fall Risk  Required Braces or Orthoses?: No  Position Activity Restriction  Other position/activity restrictions: Up with Assist.  Monitor SpO2. High Anxiety. Subjective   General  Patient assessed for rehabilitation services?: Yes  Family / Caregiver Present: No  Diagnosis: Respiratory Distress  Subjective  Subjective: RN approved Pt to be seen for OT Evaluation. General Comment  Comments: Pt was agreeable and cooperative. She was very anxious throughout re: SOB, but maintained SpO2 >90%.       Social/Functional History  Social/Functional History  Lives With: Parent (Mother- pt is mother's caregiver)  Type of Home: Condo  Home Layout: One level  Home Access: Ramped entrance  Bathroom Shower/Tub: Walk-in shower  Bathroom Toilet: Handicap height  Bathroom Equipment: Grab bars in shower, Shower chair  Home Equipment: Michail Sake, Rollator, Walker, rolling (Rollator)  ADL Assistance: Independent  Homemaking Assistance: Needs assistance  Meal Prep: Moderate  Laundry: Maximal  Cleaning: Minimal  Shopping: Moderate  Homemaking Responsibilities: Yes (Pt's sister assists with laundry, cooking, and cleaning)  Ambulation Assistance: Independent (Rollator)  Transfer Assistance: Independent  Active : Yes  Mode of Transportation: Car  Occupation: On disability  Leisure & Hobbies: scrapbooking  Additional Comments: Pt reports sisters are able to assist as needed upon discharge. Objective   Safety Devices  Type of Devices: Gait belt;Call light within reach;Nurse notified; All fall risk precautions in place; Bed alarm in place; Left in bed  Restraints  Restraints Initially in Place: No    Bed Mobility Training  Bed Mobility Training: Yes  Overall Level of Assistance: Stand-by assistance; Additional time; Adaptive equipment (HOB elevated (~60* d/t increased SOB); Bilateral Bedrails; Increased time // effort)  Interventions: Verbal cues; Visual cues; Safety awareness training (Mod Cues for safe & accurate technique // integration of Ax pacing)  Supine to Sit: Stand-by assistance  Sit to Supine: Stand-by assistance  Scooting: Stand-by assistance    Balance  Sitting: Without support  Standing: With support  Transfer Training  Transfer Training: Yes  Overall Level of Assistance: Minimum assistance; Additional time ([address] [facility] [facility] [address], without DME // AD, with increased time // effort)  Interventions: Verbal cues; Visual cues; Safety awareness training; Tactile cues (Mod Cues for safe & accurate technique // integration of Ax pacing and diaphragmatic breathing)  Sit to Stand: Minimum assistance  Stand to Sit: Minimum assistance  Stand Pivot Transfers: Minimum assistance  Bed to Chair: Minimum assistance  Toilet Transfer: Minimum assistance (Standard toilet c Bilateral grab bars)    Gait  Overall Level of Assistance: Minimum assistance; Additional time (Short distance in-room from Bed to Bathroom (without AD // DME; Using hand-held assist))  Interventions: Verbal cues; Visual cues; Safety awareness training; Tactile cues (Mod Cues for safe & accurate technique // integration of Ax pacing     AROM: Within functional limits  Strength: Generally decreased, functional (Gross BUE Strength 4-/5)  Coordination: Generally decreased, functional  Tone: Normal    ADL  Grooming: Verbal cueing; Increased time to complete;Contact guard assistance;Stand by assistance  Grooming Skilled Clinical Factors: CGA while Standing at the sink for hand hygiene (due to increased SOB, increased anxiety re: SOB, Pt completed hand hygiene seated with SBA). UE Dressing: Minimal assistance; Increased time to complete;Verbal cueing  UE Dressing Skilled Clinical Factors: Sitting EOB to doff // redon gown like a robe. LE Dressing: Moderate assistance; Increased time to complete;Verbal cueing  LE Dressing Skilled Clinical Factors: Sitting EOB, Pt required Assist to don Bilateral socks (became very upset // anxious when she could not complete this task without help). Toileting: Minimal assistance; Increased time to complete; Adaptive equipment  Toileting Skilled Clinical Factors: Standing for completing of toilet hygiene (after urination) and briief management. Additional Comments: Pt required consistent Mod to Max cues and encouragement (d/t high anxiety and increased SOB with activity). Vision  Vision: Impaired  Vision Exceptions: Wears glasses for reading  Hearing  Hearing: Within functional limits    Cognition  Overall Cognitive Status: Exceptions  Arousal/Alertness: Delayed responses to stimuli  Following Commands: Follows multistep commands with repitition; Follows multistep commands with increased time  Attention Span: Attends with cues to redirect  Safety Judgement: Decreased awareness of need for assistance;Decreased awareness of need for safety  Problem Solving: Assistance required to generate solutions;Assistance required to implement solutions;Assistance required to identify errors made  Orientation  Overall Orientation Status: Within Functional Limits  Orientation Level: Oriented X4    Education Given To: Patient;Staff  Education Provided: Role of Therapy;Plan of Care;Precautions; Equipment;ADL Adaptive Strategies;Transfer Training; Fall Prevention Strategies; Energy Conservation  Education Method: Demonstration;Verbal  Barriers to Learning: Cognition  Education Outcome: Verbalized understanding;Continued education needed      AM-PAC Score  AM-PAC Inpatient Daily Activity Raw Score: 17 (01/27/23 1805)  AM-PAC Inpatient ADL T-Scale Score : 37.26 (01/27/23 1805)  ADL Inpatient CMS 0-100% Score: 50.11 (01/27/23 1805)  ADL Inpatient CMS G-Code Modifier : CK (01/27/23 1805)      Goals  Short Term Goals  Time Frame for Short Term Goals: By Discharge, Pt will -  Short Term Goal 1: Demo Mod I and Good Integration of EC // Ax pacing during ADLs. Short Term Goal 2: Demo Sup Assist and Fair+ Integration of AE // DME during LB ADLs and Toileting. Short Term Goal 3: Complete Functional ADL and Bathroom Transfers with SBA and without LOB. Short Term Goal 4: Demo Functional Mobility (in-room // in-home negotiation) with SBA while maintaining SpO2 >90%.        Therapy Time   Individual Concurrent Group Co-treatment   Time In 4013         Time Out 1627         Minutes 32         Timed Code Treatment Minutes: 24 Minutes (ADL)       ELIUD Jain, OTR/L

## 2023-01-27 NOTE — PROGRESS NOTES
0900- Pt received PO isosorbide and ranexa this AM. Several minutes later pt O2 requirements increased, pt reported difficulty breathing, Nick Koch MD at bedside. Respiratory paged, stat breathing treatment given. Pt received decadron and benadryl. Throat swelling decreased. 1100- Pt reported feeling better, easier to breathe and swallow. Pt vitals stable.     Yamel AGUAYO

## 2023-01-27 NOTE — PROGRESS NOTES
Nephrology Progress Note      SUBJECTIVE       Pt was seen and examined. No acute issues overnite. Stable hemodynamics . Patient had angioedema today morning secondary to ? isosorbide and ranolazine. She received Decadron IV 10 mg and IV Benadryl 50 mg. Patient had not passed urine even after getting IV Bumex this morning which might be secondary to Benadryl. She is afebrile and her most recent BP is 122/71 mmHg. Saturating 97% on 4 L NC. UO is 875 ml in last 24 hrs. Labs reviewed today and show Na 141, K 4.3, Cl 107, CO2 23, BUN 34, Cr 2.24 increased from 1.99 yesterday, GFR 24 and Gluc 179. History of presenting illness: This is a 57 y.o. female who presented to the hospital for evaluation of increasing edema, increasing weight gain of about 8 to 9 pounds, and a progressive exertional dyspnea. The symptoms started about 8 or 9 days ago, she did seek medical attention the PCP who ordered a chest x-ray and was concerned about some congestive heart failure. Patient was contacted and advised to come to the hospital.     She is an office patient of Dr. Taylor Goel. Previously she has had acute on chronic renal injury and ended up on dialysis for few months secondary likely to contrast associated renal injury and as of August 2022 she has been off dialysis. Her serum creatinine seems to be hovering around 2.2 -2.4 range. At home she typically takes furosemide twice daily (120 mg twice a day). She tells me she has been very compliant in regards to her that medication use. She does not give any history of recent change in her diet, increasing salt intake, noncompliance to medications, does watch her fluids at home as well. She is going having some exertional dyspnea along with. She denies any palpitations or any episodes of syncope. She did not give any history of orthopnea. She denies any NSAID use. Patient has had a longstanding history of diabetes mellitus type 2.   Her CKD was originally thought to be related to diabetic renal disease. She does also have history of essential hypertension, dyslipidemia, diastolic CHF. She was last evaluated in the office approximately 4 to 5 weeks ago at which time her physical exam and her labs were actually relatively stable. Admission chest x-ray showed evidence of vascular congestion  Last cardiac echo from 2022 showed evidence of mild diastolic dysfunction, LV function preserved at greater than 55%, moderate left ventricular hypertrophy. On review of most recent urine studies, she seems to have developed worsening proteinuria. Her proteinuria last year was quantified somewhere around 2.5 g and most recent studies indicate proteinuria to be almost at 5 g. She denies any history of diabetic retinopathy but does admit to symptoms of neuropathy. Pt denies any hx of heavy or prolonged NSAID use. There is no history of blood or bone marrow disorders. There is no hx of jaundice or hepatitis or sexually transmitted disease. Pt has no hx of collagen vascular disease or vasculitis. There is no hx of paraprotein disease. No hx of recurrent UTI , incontinence or recurrent nephrolithiasis.      OBJECTIVE      CURRENT TEMPERATURE:  Temp: 97.9 °F (36.6 °C)  MAXIMUM TEMPERATURE OVER 24HRS:  Temp (24hrs), Av.7 °F (36.5 °C), Min:97.4 °F (36.3 °C), Max:97.9 °F (36.6 °C)    CURRENT RESPIRATORY RATE:  Resp: 18  CURRENT PULSE:  Heart Rate: 62  CURRENT BLOOD PRESSURE:  BP: 122/71  24HR BLOOD PRESSURE RANGE:  Systolic (64XMG), HHJ:426 , Min:122 , WKD:184   ; Diastolic (87PVD), DHD:15, Min:60, Max:87    24HR INTAKE/OUTPUT:    Intake/Output Summary (Last 24 hours) at 2023 1256  Last data filed at 2023 6285  Gross per 24 hour   Intake 990 ml   Output 350 ml   Net 640 ml     WEIGHT :Patient Vitals for the past 96 hrs (Last 3 readings):   Weight   23 0655 252 lb 6.8 oz (114.5 kg)   23 0200 244 lb (110.7 kg)     PHYSICAL EXAM Constitutional: No fever, no chills, no lethargy, + weakness. HEENT:           No headache, otalgia, itchy eyes, nasal discharge or sore throat. Cardiac:           No chest pain, + exertional dyspnea, no orthopnea or PND. Chest:              No cough, phlegm or wheezing. Abdomen:        No abdominal pain, nausea or vomiting. Neuro:               no focal weakness, abnormal movements orseizure like activity. Skin:                No rashes, no itching. :                  No hematuria, no pyuria, no dysuria, no flank pain. Extremities:     + swelling or joint pains.     CURRENT MEDICATIONS      ipratropium-albuterol (DUONEB) nebulizer solution 1 ampule, Q4H WA  benzocaine-menthol (CEPACOL SORE THROAT) lozenge 1 lozenge, Q2H PRN  melatonin tablet 3 mg, Nightly PRN  melatonin tablet 10 mg, Nightly PRN  glucose chewable tablet 16 g, PRN  dextrose bolus 10% 125 mL, PRN   Or  dextrose bolus 10% 250 mL, PRN  glucagon (rDNA) injection 1 mg, PRN  dextrose 10 % infusion, Continuous PRN  docusate sodium (COLACE) capsule 100 mg, BID  [Held by provider] ranolazine (RANEXA) extended release tablet 1,000 mg, Daily  [Held by provider] isosorbide dinitrate (ISORDIL) tablet 10 mg, TID  insulin lispro (HUMALOG) injection vial 0-8 Units, TID WC  insulin lispro (HUMALOG) injection vial 0-4 Units, Nightly  insulin glargine (LANTUS) injection vial 40 Units, BID  aspirin chewable tablet 81 mg, Daily  atorvastatin (LIPITOR) tablet 40 mg, Nightly  bumetanide (BUMEX) injection 2 mg, BID  gabapentin (NEURONTIN) capsule 300 mg, Daily  tamsulosin (FLOMAX) capsule 0.4 mg, Daily  sodium bicarbonate tablet 1,300 mg, BID  pantoprazole (PROTONIX) tablet 40 mg, QAM AC  sodium chloride flush 0.9 % injection 5-40 mL, 2 times per day  sodium chloride flush 0.9 % injection 10 mL, PRN  0.9 % sodium chloride infusion, PRN  potassium chloride (KLOR-CON M) extended release tablet 40 mEq, PRN   Or  potassium bicarb-citric acid (EFFER-K) effervescent tablet 40 mEq, PRN   Or  potassium chloride 10 mEq/100 mL IVPB (Peripheral Line), PRN  magnesium sulfate 1000 mg in dextrose 5% 100 mL IVPB, PRN  ondansetron (ZOFRAN-ODT) disintegrating tablet 4 mg, Q8H PRN   Or  ondansetron (ZOFRAN) injection 4 mg, Q6H PRN  polyethylene glycol (GLYCOLAX) packet 17 g, Daily PRN  acetaminophen (TYLENOL) tablet 650 mg, Q6H PRN   Or  acetaminophen (TYLENOL) suppository 650 mg, Q6H PRN  heparin (porcine) injection 5,000 Units, 3 times per day  labetalol (NORMODYNE;TRANDATE) injection 10 mg, Q6H PRN  carvedilol (COREG) tablet 25 mg, BID  amLODIPine (NORVASC) tablet 10 mg, Daily  hydrALAZINE (APRESOLINE) injection 10 mg, Q6H PRN          LABS      CBC:   Recent Labs     01/25/23  1059 01/26/23  0644 01/27/23  1035   WBC 6.8 7.8 5.8   RBC 4.14 3.97 3.76*   HGB 12.9 12.5 11.7*   HCT 41.2 41.7 37.7   MCV 99.5 105.0* 100.3   MCH 31.2 31.5 31.1   MCHC 31.3 30.0 31.0   RDW 16.9* 16.5* 15.9*    181 157   MPV 10.2 10.2 10.8      BMP:   Recent Labs     01/25/23  1059 01/25/23  1452 01/25/23  1920 01/26/23  0644 01/27/23  0309     --   --  139 141   K 4.0  --   --  4.0 4.3   *  --   --  107 107   CO2 22  --   --  22 23   BUN 31*  --   --  30* 34*   CREATININE 2.12*  --   --  1.99* 2.24*   GLUCOSE 102*   < > 94 162* 178*   CALCIUM 8.9  --   --  8.5* 8.6    < > = values in this interval not displayed.       BNP:  Lab Results   Component Value Date/Time    BNP 41 04/09/2015 12:00 AM     PHOSPHORUS:    Recent Labs     01/27/23  0309   PHOS 4.0     MAGNESIUM:   Recent Labs     01/27/23  0309   MG 2.0       IRON:    Lab Results   Component Value Date/Time    IRON 83 09/27/2022 01:32 PM     IRON SATURATION:    Lab Results   Component Value Date/Time    LABIRON 38 09/27/2022 01:32 PM     TIBC:    Lab Results   Component Value Date/Time    TIBC 217 09/27/2022 01:32 PM     FERRITIN:    Lab Results   Component Value Date/Time    FERRITIN 117 09/27/2022 01:32 PM     RUSSELL:   Lab Results Component Value Date    RUSSELL NEGATIVE 08/03/2022       SPEP:   Lab Results   Component Value Date/Time    PROT 7.0 12/01/2022 10:25 AM     UPEP:   Lab Results   Component Value Date/Time    TPU 20 11/12/2021 10:30 AM      HEPBSAG:  Lab Results   Component Value Date/Time    HEPBSAG NONREACTIVE 03/30/2022 03:30 PM     HEPCAB:  Lab Results   Component Value Date/Time    HEPCAB NONREACTIVE 03/30/2022 03:30 PM       URINE SODIUM:    Lab Results   Component Value Date/Time    JASON 47 11/14/2021 02:04 AM      URINE CREATININE:    Lab Results   Component Value Date/Time    LABCREA 65.4 01/26/2023 01:10 PM         URINALYSIS:  U/A:   Lab Results   Component Value Date/Time    NITRU NEGATIVE 01/10/2023 02:14 PM    COLORU Yellow 01/10/2023 02:14 PM    PHUR 7.0 01/10/2023 02:14 PM    WBCUA 2 TO 5 01/10/2023 02:14 PM    RBCUA 0 TO 2 01/10/2023 02:14 PM    MUCUS 2+ 07/31/2022 12:00 PM    TRICHOMONAS NOT REPORTED 11/14/2021 02:05 AM    YEAST FEW 07/02/2022 09:10 AM    BACTERIA FEW 01/10/2023 02:14 PM    SPECGRAV 1.021 01/10/2023 02:14 PM    LEUKOCYTESUR SMALL 01/10/2023 02:14 PM    UROBILINOGEN Normal 01/10/2023 02:14 PM    BILIRUBINUR NEGATIVE 01/10/2023 02:14 PM    GLUCOSEU NEGATIVE 01/10/2023 02:14 PM    KETUA NEGATIVE 01/10/2023 02:14 PM    AMORPHOUS 2+ 07/31/2022 12:00 PM     Repeat random urine protein creatinine ratio is approximately at 3.1 g.  As an outpatient it was quantified somewhere around 5 g. RADIOLOGY      XR CHEST (2 VW)    Result Date: 1/24/2023  EXAMINATION: TWO XRAY VIEWS OF THE CHEST 1/24/2023 4:25 pm COMPARISON: 06/18/2022 HISTORY: ORDERING SYSTEM PROVIDED HISTORY: Hypoxemia TECHNOLOGIST PROVIDED HISTORY: worsening of shortness of breath and hypoxemia Reason for Exam: pt state SOB x 1 yr , chest pressure FINDINGS: Status post median sternotomy. Cardiomegaly. Pulmonary vascular congestion. Pulmonary edema. No focal airspace disease. No pneumothorax.      Findings suggest congestive heart failure The findings were sent to the Radiology Results [id number] at 5:31 pm on 1/24/2023 to be communicated to a licensed caregiver. XR CHEST PORTABLE    Result Date: 1/27/2023  EXAMINATION: ONE XRAY VIEW OF THE CHEST 1/27/2023 9:45 am COMPARISON: 01/25/2023 HISTORY: ORDERING SYSTEM PROVIDED HISTORY: dyspnea TECHNOLOGIST PROVIDED HISTORY: Dyspnea FINDINGS: Median sternotomy wires are noted. Cardiomegaly, vascular congestion, and small pleural effusions are not significantly changed. No evidence of pneumothorax. No significant interval change. Vascular congestion and small pleural effusions. XR CHEST PORTABLE    Result Date: 1/25/2023  EXAMINATION: ONE XRAY VIEW OF THE CHEST 1/25/2023 11:20 am COMPARISON: 24 January 2023 HISTORY: ORDERING SYSTEM PROVIDED HISTORY: pulm edema TECHNOLOGIST PROVIDED HISTORY: pulm edema Reason for Exam: uprt port chest FINDINGS: AP portable view of the chest time stamped at 1102 hours demonstrates overlying cardiac monitoring electrodes and prior median sternotomy. Cardiomegaly and vascular congestion are noted with bilateral effusions with mild interval worsening since prior study. No extrapleural air. Osseous structures are age-appropriate. Appearance favoring worsening Ana Lilia vascular congestion/interstitial edema. ASSESSMENT      1. Stage IV CKD likely from underlying diabetic renal involvement. Was on dialysis for several months, got off dialysis approximately 5-1/2 months ago. 2.  Admitted with weight gain, worsening edema. I feel this is a combination of CHF and worsening proteinuria. She has had anywhere between 3 to 5 g of proteinuria likely secondary to either diabetic renal disease versus FSGS. .  3.  Diabetes mellitus type 2  4. Chronic hypertension  5. Diabetic polyneuropathy  6.   History of mild diastolic heart failure, preserved LV function based on last cardiac echo    PLAN      1.  1 extra dose of IV Bumex 2 mg to be given now she got benadryl today after getting angioedema secondary to isosorbide and ranolazine. Will check bladder scan  2. Maintain 2 g sodium, 1500 cc fluid restriction. 3.  I have reviewed her x-ray and it continues to show some evidence of vascular congestion. 4.  She relates allergy primarily cough to lisinopril in the past therefore would not retry ACE inhibitor's at this time. 5.  There might be some merit to adding low-dose losartan instead. 6.  We will make further recommendations  7. Following     Please do not hesitate to call with questions. This note is created with the assistance of a speech-recognition program. While intending to generate a document that actually reflects the content of the visit, no guarantees can be provided that every mistake has been identified and corrected by editing    Graciela Watts MD  Internal medicine resident, PGY 2  Nephrology service  [facility], [place]  1/27/2023    Attending Physician Statement  I have discussed the care of Karina Lind, including pertinent history and exam findings with the resident/fellow. I have reviewed the key elements of all parts of the encounter with the resident/fellow. I have seen and examined the patient with the resident/fellow. I agree with the assessment and plan and status of the problem list as documented. Long discussion with patient was conducted along with the residents. In my opinion her fluid retention is a combination of decompensated heart failure and heavy proteinuria almost in the nephrotic range. This likely secondary to diabetic renal disease versus FSGS. Unfortunately because of her allergy to ACE inhibitor's we cannot use lisinopril. This morning she had an episode of what was possibly an angioedema-like reaction to above meds. Cardiology following for that. Discussed with patient the need to give her 1 extra dose of IV Bumex today afternoon. Will follow up on echo.   Following closely with you.  Luci Spurling, MD , MD

## 2023-01-27 NOTE — PROGRESS NOTES
[facility]  Office: [address], DO, Stacy Matias, DO, Darrick De Luna, DO, Reyes Willie Gill, DO, Jacque Rivera MD, Jason Evans MD, Denise Winn MD, Brianne Bee MD,  Emilio Waddell MD, Augustina Samuel MD, Beka Samuel DO, Chanelle Mc MD,  Latonya Shahid MD, Alvaro Loyola MD, Ezequiel Guadarrama DO, Jessica Figueroa MD, Missy Lopez MD, Prema Abreu DO, Pepper Ibrahim MD, Jovanni Kent MD, Patricia Fontenot MD, Katerin Membreno MD, Andrena Jeans, DO, Xuan Rios MD, Rangel Munguia MD, Julius Trent, Taran Gil, CNP, Olivia Kincaid, CNP, Aureliano Hayes, CNP,  Pat Chacon, DNP, Ashly Scruggs, CNP, Vanessa Mckeon, CNP, Harlan Mclean, CNP, Lennox Portela, CNP, Christina Lindsey, CNP, Jay Mroel PAArsenioC, Shruthi Estrada, CNS, Toño Bajwa, CNP, Lori Charlton CNP         Enrike Rodgers 19    Progress Note    1/27/2023    7:40 AM    Name:   Robert Cuello  MRN:     [id number]     Acct:      [de-identified]   Room:   [address] [place] [address] Day:  2  Admit Date:  1/25/2023 10:13 AM    PCP:   AFSANEH Montemayor CNP  Code Status:  Full Code    Subjective:     C/C:   Chief Complaint   Patient presents with    Shortness of Breath    Weight Gain     Interval History Status: improved. Vitals reviewed, afebrile and hemodynamically stable. Initially saturating well on 4 L nasal cannula. Labs reviewed, creatinine 2.24. Overnight patient had no significant events. On examination patient was struggling to breathe. Complaining that she could not take a deep breath and felt her voice was progressively getting more hoarse. On evaluation patient was tachypneic in the 30s and oxygen saturations on 4 L nasal cannula was in the high 80s. On auscultation patient was moving very little air but was not stridorous or wheezing. Her throat was not erythematous but did appear to be edematous.   Decadron 10 mg IV and Benadryl 50 mg IV were both given. Patient had significant improvement almost immediately following steroids as her oxygen saturation improved to mid 90s and her respiratory rate dropped into the teens. ICU was contacted at the time laryngeal edema was identified. ICU evaluated patient at bedside however at this time patient had improved significantly with some hoarseness remaining but no wheezing and better breath sounds bilaterally. Transfer ICU was held at this time and patient was to be evaluated. Only new medications patient has received over the past 24 hours was isosorbide dinitrate and Ranexa. Ranexa was given this morning however patient states since yesterday afternoon she has been getting progressively more itchy and significantly more itchy after her nightly meds. Isosorbide dinitrate and Ranexa were both held and cardiology was informed. Brief History:     Enrike Espinoza is a 57 y.o. Non- / non  female who presents with Shortness of Breath and Weight Gain   and is admitted to the hospital for the management of Acute respiratory distress. 57year-old female with known medical history of diastolic heart failure, CKD, hypertension, hyperlipidemia, type 2 diabetes mellitus, coronary artery disease with history of CABG presents to the hospital with worsening shortness of breath and leg swelling for last 1 month. Patient states that she was on dialysis after she got contrast for a CT PE scan and was recently taken off dialysis in August 2022 by Dr. Zhen Peng. Her kidney numbers have remained stable after getting off dialysis. Patient reports that she started noticing increasing swelling of her lower extremities and increasing shortness of breath on minimal exertion. Patient followed up with her PCP who advised patient to get chest x-ray done which showed congestion.   Patient was then asked by PCP to come to the hospital.     In the ER patient was noted to be hypertensive with blood pressure 186/88, she was hypoxic saturating 78% on room air. She was initially started on nonrebreather and then switched to BiPAP. On my examination patient was on BiPAP. She was also started on nitro drip for hypertension and pulmonary edema. Her creatinine is around 2.12. Troponin of 82 and 83. Review of Systems:     Constitutional:  negative for chills, fevers, sweats  Respiratory: Positive for cough, dyspnea on exertion, shortness of breath, throat swelling. Negative for wheezing  Cardiovascular: Positive for chest pain and lower extremity edema. Negative for palpitations  Gastrointestinal:  negative for abdominal pain, constipation, diarrhea, nausea, vomiting  Neurological:  negative for dizziness, headache    Medications: Allergies: Allergies   Allergen Reactions    Adhesive Tape Other (See Comments) and Rash     Other reaction(s): Unknown    Metformin And Related Diarrhea    Ace Inhibitors Other (See Comments)     Cough, states not good for her kidneys    Iv Dye [Iodides]      Stage 4 kidney disease    Nsaids      CANNOT TAKE D/T KIDNEY DISEASE    Metformin And Related Hives, Itching and Rash     Stage 4 kidney disease.        Current Meds:   Scheduled Meds:    docusate sodium  100 mg Oral BID    ranolazine  1,000 mg Oral Daily    isosorbide dinitrate  10 mg Oral TID    insulin lispro  0-8 Units SubCUTAneous TID WC    insulin lispro  0-4 Units SubCUTAneous Nightly    insulin glargine  40 Units SubCUTAneous BID    aspirin  81 mg Oral Daily    atorvastatin  40 mg Oral Nightly    bumetanide  2 mg IntraVENous BID    gabapentin  300 mg Oral Daily    tamsulosin  0.4 mg Oral Daily    sodium bicarbonate  1,300 mg Oral BID    pantoprazole  40 mg Oral QAM AC    sodium chloride flush  5-40 mL IntraVENous 2 times per day    heparin (porcine)  5,000 Units SubCUTAneous 3 times per day    carvedilol  25 mg Oral BID    amLODIPine  10 mg Oral Daily     Continuous Infusions:    dextrose      sodium chloride       PRN Meds: melatonin, melatonin, glucose, dextrose bolus **OR** dextrose bolus, glucagon (rDNA), dextrose, sodium chloride flush, sodium chloride, potassium chloride **OR** potassium alternative oral replacement **OR** potassium chloride, magnesium sulfate, ondansetron **OR** ondansetron, polyethylene glycol, acetaminophen **OR** acetaminophen, labetalol, hydrALAZINE    Data:     Past Medical History:   has a past medical history of Arthritis, Backache, unspecified, Cerebral artery occlusion with cerebral infarction ([facility] CHF (congestive heart failure) ([facility] Chronic kidney disease, Coronary atherosclerosis of artery bypass graft, COVID, Cramp of limb, Gallstones, Hemodialysis patient ([facility] Hyperlipidemia, Hypertension, Insomnia, Neuromuscular disorder ([facility] Pneumonia, Psychiatric problem, Thyroid disease, Type II or unspecified type diabetes mellitus with renal manifestations, not stated as uncontrolled(250.40), Type II or unspecified type diabetes mellitus without mention of complication, not stated as uncontrolled, and Unspecified vitamin D deficiency. Social History:   reports that she has never smoked. She has never used smokeless tobacco. She reports that she does not drink alcohol and does not use drugs. Family History:   Family History   Problem Relation Age of Onset    Diabetes Father     Heart Failure Father        Vitals:  /87   Pulse 68   Temp 97.6 °F (36.4 °C) (Temporal)   Resp 30   Ht 5' 5\" (1.651 m)   Wt 252 lb 6.8 oz (114.5 kg)   SpO2 95%   BMI 42.01 kg/m²   Temp (24hrs), Av.6 °F (36.4 °C), Min:97.3 °F (36.3 °C), Max:97.9 °F (36.6 °C)    Recent Labs     23  0744 23  1152 23  1506 23  1911   POCGLU 164* 298* 325* 293*         I/O (24Hr):     Intake/Output Summary (Last 24 hours) at 2023 0740  Last data filed at 2023 9836  Gross per 24 hour   Intake 1789 ml   Output 875 ml   Net 914 ml         Labs:  Hematology:  Recent Labs 01/25/23  1059 01/26/23  0644   WBC 6.8 7.8   RBC 4.14 3.97   HGB 12.9 12.5   HCT 41.2 41.7   MCV 99.5 105.0*   MCH 31.2 31.5   MCHC 31.3 30.0   RDW 16.9* 16.5*    181   MPV 10.2 10.2   INR 1.1 1.0       Chemistry:  Recent Labs     01/25/23  1059 01/25/23  1214 01/25/23  1452 01/25/23  1920 01/25/23  1927 01/26/23  0644 01/27/23  0309     --   --   --   --  139 141   K 4.0  --   --   --   --  4.0 4.3   *  --   --   --   --  107 107   CO2 22  --   --   --   --  22 23   GLUCOSE 102*  --    < > 94  --  162* 178*   BUN 31*  --   --   --   --  30* 34*   CREATININE 2.12*  --   --   --   --  1.99* 2.24*   MG  --   --   --   --   --   --  2.0   ANIONGAP 11  --   --   --   --  10 11   LABGLOM 26*  --   --   --   --  28* 24*   CALCIUM 8.9  --   --   --   --  8.5* 8.6   PHOS  --   --   --   --   --   --  4.0   PROBNP 2,135*  --   --   --   --   --   --    TROPHS 82* 83*  --   --  72*  --   --     < > = values in this interval not displayed. Recent Labs     01/25/23  1451 01/25/23  1914 01/26/23  0744 01/26/23  1152 01/26/23  1506 01/26/23 1911   POCGLU 84 94 164* 298* 325* 293*       ABG:  Lab Results   Component Value Date/Time    PHART 7.429 04/24/2022 01:42 PM    OZX4OIO 45.9 04/24/2022 01:42 PM    PO2ART 47.6 04/24/2022 01:42 PM    AKP6JRG 30.4 04/24/2022 01:42 PM    PBEA 6.1 04/24/2022 01:42 PM    Q7DVBAOG 82.8 04/24/2022 01:42 PM    FIO2 INFORMATION NOT PROVIDED 01/25/2023 10:59 AM     Lab Results   Component Value Date/Time    SPECIAL RIGHT HAND 2ML 08/03/2022 02:22 AM     Lab Results   Component Value Date/Time    CULTURE NO SIGNIFICANT GROWTH 01/10/2023 02:14 PM       Radiology:  XR CHEST (2 VW)    Result Date: 1/24/2023  Findings suggest congestive heart failure The findings were sent to the Radiology Results [address] at 5:31 pm on 1/24/2023 to be communicated to a licensed caregiver.      XR CHEST PORTABLE    Result Date: 1/25/2023  Appearance favoring worsening \[facility] [place] [facility]\"" vascular congestion/interstitial edema. Physical Examination:        General appearance:  alert, cooperative and no distress  Mental Status:  oriented to person, place and time and normal affect  HEENT: No significant tongue edema however throat edematous and patient's voice is hoarse but did improve with Decadron. Lungs: Significantly diminished breath sounds bilaterally with improvement with Decadron. Heart:  regular rate and rhythm, no murmur  Abdomen:  soft, nontender, nondistended, bowel sounds are present. Extremities: 1+ pitting bilateral lower extremity edema, no redness, no tenderness in the calves  Skin:  no gross lesions, rashes, induration    Assessment:        Hospital Problems             Last Modified POA    * (Principal) Acute respiratory distress 1/25/2023 Yes    Type 2 diabetes mellitus with hyperglycemia, with long-term current use of insulin ([address] 1/25/2023 Yes    Hypertensive urgency 1/25/2023 Yes    Hypoxia 1/25/2023 Yes    Congestive heart failure ([address] 1/26/2023 Yes    Nephrotic range proteinuria 1/26/2023 Yes    Atherosclerosis of coronary artery bypass graft of native heart without angina pectoris 1/25/2023 Yes    Acute on chronic diastolic heart failure ([address] 1/25/2023 Yes    Diabetic polyneuropathy associated with type 2 diabetes mellitus ([address] 1/25/2023 Yes    History of coronary artery bypass graft 1/25/2023 Yes    Mixed hyperlipidemia 1/25/2023 Yes    Stage 4 chronic kidney disease ([address] 1/25/2023 Yes    Morbid obesity with BMI of 40.0-44.9, adult ([address] 1/25/2023 Yes     Plan:        Laryngeal edema? Secondary to Ranexa versus isosorbide dinitrate? Patient was given 10 mg of Decadron as well as 50 mg of Benadryl. Significant improvement in symptoms. ICU was initially contacted however patient improved will monitor on floor for now. Ranexa and isosorbide dinitrate held and cardiology was informed. Acute respiratory failure with hypoxia. Secondary to #3.   Currently saturating well on 4 L nasal cannula and patient states she uses 3 L intermittently at home as needed. Acute on chronic diastolic congestive heart failure. Echocardiogram reviewed from 8/2/2022 with EF 55% and no obvious wall motion abnormalities. Wean off nitro infusion. Continue Bumex 2 mg IV twice daily this morning, Coreg 25 mg twice daily. Hold isosorbide dinitrate and Ranexa due to suspected hypersensitivity/angioedema. Elevated troponin. Troponin mildly elevated above baseline with complaints of chest pressure. Cardiology consulted. Plan for echocardiogram.    Hypertension. Continue amlodipine 10 mg daily, Coreg 25 mg twice daily. Stage IV CKD. Follows outpatient with Dr. Taylor Goel. Creatinine stable. Nephrology following due to patient's history of requiring hemodialysis in the past.    Type 2 diabetes mellitus. Hemoglobin A1c 7.1 1/19/2023. Continue Lantus 25 units twice daily for now (patient takes Lantus 55 units twice daily at home). POCT glucose and hypoglycemia protocol. Hyperlipidemia. Continue Lipitor 40 mg nightly. CAD status post CABG. continue aspirin 81 mg daily, Lipitor 40 mg nightly, Coreg 25 mg twice daily, Ranexa 100 mg daily. Morbid obesity. Encourage lifestyle modification with diet and exercise. DVT prophylaxis: Heparin 5000 units subcutaneously every 8 hours. GI prophylaxis: Protonix 40 mg daily. Discharge planning: Awaiting echocardiogram.  Patient also had suspected reaction to medication monitoring response to steroids and Benadryl.     Alonso Dwyer DO  1/27/2023  7:40 AM

## 2023-01-28 LAB
ANION GAP SERPL CALCULATED.3IONS-SCNC: 12 MMOL/L (ref 9–17)
BUN BLDV-MCNC: 42 MG/DL (ref 8–23)
CALCIUM SERPL-MCNC: 8.6 MG/DL (ref 8.6–10.4)
CHLORIDE BLD-SCNC: 105 MMOL/L (ref 98–107)
CO2: 21 MMOL/L (ref 20–31)
CREAT SERPL-MCNC: 2.63 MG/DL (ref 0.5–0.9)
GFR SERPL CREATININE-BSD FRML MDRD: 20 ML/MIN/1.73M2
GLUCOSE BLD-MCNC: 205 MG/DL (ref 65–105)
GLUCOSE BLD-MCNC: 243 MG/DL (ref 65–105)
GLUCOSE BLD-MCNC: 329 MG/DL (ref 65–105)
GLUCOSE BLD-MCNC: 380 MG/DL (ref 65–105)
GLUCOSE BLD-MCNC: 411 MG/DL (ref 70–99)
MAGNESIUM: 2.1 MG/DL (ref 1.6–2.6)
POTASSIUM SERPL-SCNC: 5.3 MMOL/L (ref 3.7–5.3)
SODIUM BLD-SCNC: 138 MMOL/L (ref 135–144)

## 2023-01-28 PROCEDURE — 99232 SBSQ HOSP IP/OBS MODERATE 35: CPT | Performed by: STUDENT IN AN ORGANIZED HEALTH CARE EDUCATION/TRAINING PROGRAM

## 2023-01-28 PROCEDURE — 6370000000 HC RX 637 (ALT 250 FOR IP): Performed by: NURSE PRACTITIONER

## 2023-01-28 PROCEDURE — 2500000003 HC RX 250 WO HCPCS: Performed by: STUDENT IN AN ORGANIZED HEALTH CARE EDUCATION/TRAINING PROGRAM

## 2023-01-28 PROCEDURE — 80048 BASIC METABOLIC PNL TOTAL CA: CPT

## 2023-01-28 PROCEDURE — 6370000000 HC RX 637 (ALT 250 FOR IP): Performed by: STUDENT IN AN ORGANIZED HEALTH CARE EDUCATION/TRAINING PROGRAM

## 2023-01-28 PROCEDURE — 2060000000 HC ICU INTERMEDIATE R&B

## 2023-01-28 PROCEDURE — 6370000000 HC RX 637 (ALT 250 FOR IP): Performed by: CLINICAL NURSE SPECIALIST

## 2023-01-28 PROCEDURE — 82947 ASSAY GLUCOSE BLOOD QUANT: CPT

## 2023-01-28 PROCEDURE — 36415 COLL VENOUS BLD VENIPUNCTURE: CPT

## 2023-01-28 PROCEDURE — 2700000000 HC OXYGEN THERAPY PER DAY

## 2023-01-28 PROCEDURE — 99233 SBSQ HOSP IP/OBS HIGH 50: CPT | Performed by: INTERNAL MEDICINE

## 2023-01-28 PROCEDURE — 83735 ASSAY OF MAGNESIUM: CPT

## 2023-01-28 PROCEDURE — 94761 N-INVAS EAR/PLS OXIMETRY MLT: CPT

## 2023-01-28 PROCEDURE — 2580000003 HC RX 258: Performed by: STUDENT IN AN ORGANIZED HEALTH CARE EDUCATION/TRAINING PROGRAM

## 2023-01-28 PROCEDURE — 94640 AIRWAY INHALATION TREATMENT: CPT

## 2023-01-28 PROCEDURE — 6360000002 HC RX W HCPCS: Performed by: STUDENT IN AN ORGANIZED HEALTH CARE EDUCATION/TRAINING PROGRAM

## 2023-01-28 PROCEDURE — 6360000002 HC RX W HCPCS: Performed by: INTERNAL MEDICINE

## 2023-01-28 PROCEDURE — 51798 US URINE CAPACITY MEASURE: CPT

## 2023-01-28 RX ORDER — INSULIN GLARGINE 100 [IU]/ML
60 INJECTION, SOLUTION SUBCUTANEOUS 2 TIMES DAILY
Status: DISCONTINUED | OUTPATIENT
Start: 2023-01-28 | End: 2023-01-29

## 2023-01-28 RX ORDER — INSULIN LISPRO 100 [IU]/ML
0-16 INJECTION, SOLUTION INTRAVENOUS; SUBCUTANEOUS
Status: DISCONTINUED | OUTPATIENT
Start: 2023-01-28 | End: 2023-02-06 | Stop reason: HOSPADM

## 2023-01-28 RX ORDER — INSULIN GLARGINE 100 [IU]/ML
50 INJECTION, SOLUTION SUBCUTANEOUS 2 TIMES DAILY
Status: DISCONTINUED | OUTPATIENT
Start: 2023-01-28 | End: 2023-01-28

## 2023-01-28 RX ORDER — CHLOROTHIAZIDE SODIUM 500 MG/1
250 INJECTION INTRAVENOUS ONCE
Status: COMPLETED | OUTPATIENT
Start: 2023-01-28 | End: 2023-01-28

## 2023-01-28 RX ORDER — INSULIN LISPRO 100 [IU]/ML
0-4 INJECTION, SOLUTION INTRAVENOUS; SUBCUTANEOUS NIGHTLY
Status: DISCONTINUED | OUTPATIENT
Start: 2023-01-28 | End: 2023-02-05

## 2023-01-28 RX ADMIN — HEPARIN SODIUM 5000 UNITS: 5000 INJECTION INTRAVENOUS; SUBCUTANEOUS at 16:18

## 2023-01-28 RX ADMIN — BUMETANIDE 2 MG: 0.25 INJECTION INTRAMUSCULAR; INTRAVENOUS at 09:05

## 2023-01-28 RX ADMIN — IPRATROPIUM BROMIDE AND ALBUTEROL SULFATE 1 AMPULE: 2.5; .5 SOLUTION RESPIRATORY (INHALATION) at 07:18

## 2023-01-28 RX ADMIN — INSULIN GLARGINE 60 UNITS: 100 INJECTION, SOLUTION SUBCUTANEOUS at 21:00

## 2023-01-28 RX ADMIN — IPRATROPIUM BROMIDE AND ALBUTEROL SULFATE 1 AMPULE: 2.5; .5 SOLUTION RESPIRATORY (INHALATION) at 15:38

## 2023-01-28 RX ADMIN — INSULIN GLARGINE 50 UNITS: 100 INJECTION, SOLUTION SUBCUTANEOUS at 09:02

## 2023-01-28 RX ADMIN — PANTOPRAZOLE SODIUM 40 MG: 40 TABLET, DELAYED RELEASE ORAL at 06:41

## 2023-01-28 RX ADMIN — TAMSULOSIN HYDROCHLORIDE 0.4 MG: 0.4 CAPSULE ORAL at 08:58

## 2023-01-28 RX ADMIN — SODIUM CHLORIDE, PRESERVATIVE FREE 10 ML: 5 INJECTION INTRAVENOUS at 20:51

## 2023-01-28 RX ADMIN — IPRATROPIUM BROMIDE AND ALBUTEROL SULFATE 1 AMPULE: 2.5; .5 SOLUTION RESPIRATORY (INHALATION) at 20:13

## 2023-01-28 RX ADMIN — CARVEDILOL 25 MG: 25 TABLET, FILM COATED ORAL at 08:58

## 2023-01-28 RX ADMIN — CHLOROTHIAZIDE SODIUM 250 MG: 500 INJECTION, POWDER, LYOPHILIZED, FOR SOLUTION INTRAVENOUS at 20:49

## 2023-01-28 RX ADMIN — INSULIN LISPRO 4 UNITS: 100 INJECTION, SOLUTION INTRAVENOUS; SUBCUTANEOUS at 17:32

## 2023-01-28 RX ADMIN — HEPARIN SODIUM 5000 UNITS: 5000 INJECTION INTRAVENOUS; SUBCUTANEOUS at 09:03

## 2023-01-28 RX ADMIN — SODIUM BICARBONATE 1300 MG: 648 TABLET ORAL at 20:49

## 2023-01-28 RX ADMIN — INSULIN LISPRO 8 UNITS: 100 INJECTION, SOLUTION INTRAVENOUS; SUBCUTANEOUS at 06:41

## 2023-01-28 RX ADMIN — INSULIN LISPRO 12 UNITS: 100 INJECTION, SOLUTION INTRAVENOUS; SUBCUTANEOUS at 12:13

## 2023-01-28 RX ADMIN — HEPARIN SODIUM 5000 UNITS: 5000 INJECTION INTRAVENOUS; SUBCUTANEOUS at 00:26

## 2023-01-28 RX ADMIN — GABAPENTIN 300 MG: 300 CAPSULE ORAL at 20:49

## 2023-01-28 RX ADMIN — DOCUSATE SODIUM 100 MG: 100 CAPSULE ORAL at 20:49

## 2023-01-28 RX ADMIN — IPRATROPIUM BROMIDE AND ALBUTEROL SULFATE 1 AMPULE: 2.5; .5 SOLUTION RESPIRATORY (INHALATION) at 11:17

## 2023-01-28 RX ADMIN — BUMETANIDE 2 MG: 0.25 INJECTION INTRAMUSCULAR; INTRAVENOUS at 21:28

## 2023-01-28 RX ADMIN — DOCUSATE SODIUM 100 MG: 100 CAPSULE ORAL at 08:58

## 2023-01-28 RX ADMIN — SODIUM BICARBONATE 1300 MG: 648 TABLET ORAL at 08:58

## 2023-01-28 RX ADMIN — ATORVASTATIN CALCIUM 40 MG: 80 TABLET, FILM COATED ORAL at 20:50

## 2023-01-28 RX ADMIN — ASPIRIN 81 MG: 81 TABLET, CHEWABLE ORAL at 08:58

## 2023-01-28 RX ADMIN — Medication 10 MG: at 22:47

## 2023-01-28 RX ADMIN — CARVEDILOL 25 MG: 25 TABLET, FILM COATED ORAL at 20:49

## 2023-01-28 RX ADMIN — AMLODIPINE BESYLATE 10 MG: 10 TABLET ORAL at 08:59

## 2023-01-28 RX ADMIN — SODIUM CHLORIDE, PRESERVATIVE FREE 10 ML: 5 INJECTION INTRAVENOUS at 09:00

## 2023-01-28 NOTE — PLAN OF CARE
Problem: Respiratory - Adult  Goal: Achieves optimal ventilation and oxygenation  1/28/2023 0301 by Maricruz Van RCP  Outcome: Progressing  1/27/2023 2302 by Dixie Castro RN  Outcome: Progressing

## 2023-01-28 NOTE — PROGRESS NOTES
[facility]  Progress Note                   Date:   1/28/2023  Patient name: Robert Cuello  Date of admission:  1/25/2023 10:13 AM  MRN:   [id number]  YOB: 1960  PCP: AFSANEH Montemayor CNP    Reason for Admission: Acute respiratory distress [R06.03]  Hypoxia [R09.02]  Congestive heart failure, unspecified HF chronicity, unspecified heart failure type ([address] [I50.9]    Subjective:       Clinical Changes /Abnormalities: Patient seen and examined in room, laying in bed. She denies chest pain. She states she could not breath yesterday and today breathing has improved dramatically, on O2 via NC without distress. Labs, vitals, tele reviewed, SR/SB.      Review of Systems    Medications:   Scheduled Meds:   insulin glargine  50 Units SubCUTAneous BID    insulin lispro  0-16 Units SubCUTAneous TID WC    insulin lispro  0-4 Units SubCUTAneous Nightly    ipratropium-albuterol  1 ampule Inhalation Q4H WA    gabapentin  300 mg Oral Nightly    docusate sodium  100 mg Oral BID    [Held by provider] ranolazine  1,000 mg Oral Daily    [Held by provider] isosorbide dinitrate  10 mg Oral TID    aspirin  81 mg Oral Daily    atorvastatin  40 mg Oral Nightly    bumetanide  2 mg IntraVENous BID    tamsulosin  0.4 mg Oral Daily    sodium bicarbonate  1,300 mg Oral BID    pantoprazole  40 mg Oral QAM AC    sodium chloride flush  5-40 mL IntraVENous 2 times per day    heparin (porcine)  5,000 Units SubCUTAneous 3 times per day    carvedilol  25 mg Oral BID    amLODIPine  10 mg Oral Daily     Continuous Infusions:   dextrose      sodium chloride       CBC:   Recent Labs     01/25/23  1059 01/26/23  0644 01/27/23  1035   WBC 6.8 7.8 5.8   HGB 12.9 12.5 11.7*    181 157       BMP:    Recent Labs     01/26/23  0644 01/27/23  0309 01/28/23  0527    141 138   K 4.0 4.3 5.3    107 105   CO2 22 23 21   BUN 30* 34* 42*   CREATININE 1.99* 2.24* 2.63*   GLUCOSE 162* 178* 411*       Hepatic:No results for input(s): AST, ALT, ALB, BILITOT, ALKPHOS in the last 72 hours. Troponin:   Recent Labs     01/25/23  1059 01/25/23  1214 01/25/23  1927   TROPHS 82* 83* 72*       BNP: No results for input(s): BNP in the last 72 hours. Lipids: No results for input(s): CHOL, HDL in the last 72 hours. Invalid input(s): LDLCALCU  INR:   Recent Labs     01/25/23  1059 01/26/23  0644   INR 1.1 1.0       Diagnostics:    EKG: normal sinus rhythm, did not show any significant ischemic changes    ECHO 1/27/23  Summary  Global left ventricular systolic function is normal.  Estimated LVEF 50-55%. Mild concentric left ventricular hypertrophy. Normal right ventricular size and function. No significant valvular regurgitation or stenosis seen. No pericardial effusion seen. ECHO: 4/14/22  Summary  Left ventricle is normal in size. Moderate left ventricular hypertrophy. Global left ventricular systolic function is normal. Estimated LV EF >55 %. No obvious wall motion abnormality seen. Evidence of mild (grade I)  diastolic dysfunction. Left atrium is normal in size. Mild tricuspid regurgitation. No pulmonary hypertension. Estimated right ventricular systolic pressure is  64MOOH. Stress Test: not obtained. Cardiac Angiography: not obtained. Objective:   Vitals: /63   Pulse 80   Temp 97.8 °F (36.6 °C) (Oral)   Resp 19   Ht 5' 5\" (1.651 m)   Wt 252 lb 6.8 oz (114.5 kg)   SpO2 96%   BMI 42.01 kg/m²   General appearance: alert and cooperative with exam  HEENT: Head: Normocephalic, no lesions, without obvious abnormality. Neck:no JVD, trachea midline, no adenopathy  Lungs: Clear to auscultation, crackles left lower base.    Heart: Regular rate and rhythm, s1/s2 auscultated, no murmurs  Abdomen: soft, non-tender, bowel sounds active  Extremities: 1-2+ edema  Neurologic: not done        Assessment / Acute Cardiac Problems:   Acute on chronic hypoxic respiratory failure  Acute on chronic CHF exacerbation  Hypertensive urgency  CKD  Type 2 diabetes mellitus  CAD status post CABG  Obesity  History of DVT    Patient Active Problem List:     Atherosclerosis of coronary artery bypass graft of native heart without angina pectoris     Acute on chronic diastolic heart failure (HCC)     Diabetic polyneuropathy associated with type 2 diabetes mellitus ([address]     History of coronary artery bypass graft     Iron deficiency anemia     Spinal stenosis of lumbar region with neurogenic claudication     Mixed hyperlipidemia     Stage 4 chronic kidney disease ([facility])     Type 2 diabetes 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, with long-term current use of insulin ([facility])     Obesity, Class II, BMI 35-39.9     Thyroid nodule greater than or equal to 1 cm in diameter incidentally noted on imaging study     Essential hypertension     Chronic ischemic heart disease     Ischemic stroke of frontal lobe ([facility])     Morbid obesity with BMI of 40.0-44.9, adult ([facility])     Disequilibrium syndrome     Generalized weakness     Long-term memory loss     Muscle right arm weakness     Anxiety     Chronic midline low back pain with bilateral sciatica     AMS (altered mental status)     Tremor     COVID     ESRD (end stage renal disease) ([address]     Diabetic ketoacidosis without coma associated with type 2 diabetes mellitus ([address]     Hypokalemia     Confusion     Myoclonic jerking     Hyperglycemia     Cerebral hypoperfusion     Spasm of muscle     Low back pain     Muscle spasm     Immature arteriovenous fistula ([facility])     History of fusion of cervical spine     Depression with anxiety     Vancomycin resistant Enterococcus UTI     ESRD (end stage renal disease) ([address]     Dialysis disequilibrium syndrome     Cystitis     VRE infection (vancomycin resistant Enterococcus)     Infection due to ESBL-producing Klebsiella pneumoniae     Class 2 severe obesity due to excess calories with serious comorbidity and body mass index (BMI) of 35.0 to 35.9 in adult [facility])     Type 2 diabetes mellitus with hyperglycemia, with long-term current use of insulin (HCC)     Disorientation     Metabolic encephalopathy     Right hemiparesis (HCC)     Ulcer of left foot, limited to breakdown of skin (HCC)     Secondary hyperparathyroidism ([facility]     Acute respiratory distress     Hypertensive urgency     Hypoxia     Congestive heart failure ([facility]     Nephrotic range proteinuria      Plan of Treatment:   ECHO reviewed as above. LVEF 50-55%. She denies chest pain. CHF. She states her breathing continues to improved. On 4L NC without distress. Continue bumex. Strict I&O's, compression stockings, daily weights and fluid restrictions. Nephrology on board, appreciate recommendations with volume management. HTN controlled. Continue Norvasc and BB. CAD with H/o CABG. Continue ASA, and statin, BB, and CCB. Discussed with primary, Ranexa and Isosorbide dinitrate on hold due to possible allergy, laryngeal edema, and only new medications given. Patient takes Ranexa at home. Cardiology to sign off. Please call with any further questions or concerns. Patient to follow up in 1-3 weeks post DC.      Electronically signed by AFSANEH Villarreal CNP on 1/28/2023 at 9:41 AM  [address]  [phone]

## 2023-01-28 NOTE — PLAN OF CARE
Problem: Discharge Planning  Goal: Discharge to home or other facility with appropriate resources  Outcome: Progressing     Problem: Pain  Goal: Verbalizes/displays adequate comfort level or baseline comfort level  Outcome: Progressing     Problem: Safety - Adult  Goal: Free from fall injury  Outcome: Progressing     Problem: Chronic Conditions and Co-morbidities  Goal: Patient's chronic conditions and co-morbidity symptoms are monitored and maintained or improved  Outcome: Progressing     Problem: Respiratory - Adult  Goal: Achieves optimal ventilation and oxygenation  1/28/2023 1651 by Gómez Zurita RN  Outcome: Progressing     Problem: Skin/Tissue Integrity  Goal: Absence of new skin breakdown  Description: 1. Monitor for areas of redness and/or skin breakdown  2. Assess vascular access sites hourly  3. Every 4-6 hours minimum:  Change oxygen saturation probe site  4. Every 4-6 hours:  If on nasal continuous positive airway pressure, respiratory therapy assess nares and determine need for appliance change or resting period.   Outcome: Progressing

## 2023-01-28 NOTE — PLAN OF CARE
Problem: Discharge Planning  Goal: Discharge to home or other facility with appropriate resources  1/27/2023 2302 by James Gipson RN  Outcome: Progressing     Problem: Pain  Goal: Verbalizes/displays adequate comfort level or baseline comfort level  1/27/2023 2302 by James Gipson RN  Outcome: Progressing     Problem: Chronic Conditions and Co-morbidities  Goal: Patient's chronic conditions and co-morbidity symptoms are monitored and maintained or improved  1/27/2023 2302 by James Gipson RN  Outcome: Progressing

## 2023-01-28 NOTE — PROGRESS NOTES
[facility]  Office: [address], DO, Winnie Machado, DO, Amarilis Esteves, DO, Damaris Gill, DO, Spike Healy MD, Rita Rockwell MD, Kari Romano MD, Natalya Yeung MD,  Ailyn Santiaog MD, Eldon Mehta MD, Camilla Lockett DO, Mnotse Garcia MD,  Apolonia Sagastume MD, Giuseppe Souza MD, Lola Cruz DO, Bhargav Centeno MD, Cady Fu MD, Elena Rivas DO, Livier Jett MD, Courtney Riedel, MD, Rico Mario MD, Yasmany Milan MD, Joan Lopez DO, Renetta Jang MD, Katelyn Mc MD, Anand Dunham, Shonda Melendez, FRANKY, Mike Aguilar, CNP, Axel Slater, CNP,  Charlene Flood, [facility], Jenifer Robert, CNP, Leidy Leung, CNP, Lincoln Stanley, CNP, Heather Kaur, CNP, Nithya Joyce, CNP, eLelee Frank PA-C, Fidel Ballesteros, CNS, Zach King, CNP, Nilson Card, CNP         [address]    Progress Note    1/28/2023    7:36 AM    Name:   Naty Meyer  MRN:     [id number]     Acct:      [de-identified]   Room:   [facility] Day:  3  Admit Date:  1/25/2023 10:13 AM    PCP:   AFSANEH Madrid CNP  Code Status:  Full Code    Subjective:     C/C:   Chief Complaint   Patient presents with    Shortness of Breath    Weight Gain     Interval History Status: improved. Vitals reviewed, afebrile and hemodynamically stable. Initially saturating well on 4 L nasal cannula. Labs reviewed, creatinine 2.24. Overnight patient had no significant events. On examination patient seen comfortably in bed. No complaints at this time. Echocardiogram demonstrated left ventricular ejection fraction 50 to 55% with indeterminate diastolic dysfunction. Per nephrology fluid retention likely secondary to decompensated heart failure and heavy proteinuria believed to be secondary to diabetic renal disease versus FSGS but lisinopril cannot be used.   She was given an additional dose of 2 mg IV Bumex yesterday patient still only had however 950 mL. We will clarify with nephrology regarding diuretics versus possible hemodialysis? Brief History:     Karina Lind is a 57 y.o. Non- / non  female who presents with Shortness of Breath and Weight Gain   and is admitted to the hospital for the management of Acute respiratory distress. 57year-old female with known medical history of diastolic heart failure, CKD, hypertension, hyperlipidemia, type 2 diabetes mellitus, coronary artery disease with history of CABG presents to the hospital with worsening shortness of breath and leg swelling for last 1 month. Patient states that she was on dialysis after she got contrast for a CT PE scan and was recently taken off dialysis in August 2022 by Dr. Kathleen Pineda. Her kidney numbers have remained stable after getting off dialysis. Patient reports that she started noticing increasing swelling of her lower extremities and increasing shortness of breath on minimal exertion. Patient followed up with her PCP who advised patient to get chest x-ray done which showed congestion. Patient was then asked by PCP to come to the hospital.     In the ER patient was noted to be hypertensive with blood pressure 186/88, she was hypoxic saturating 78% on room air. She was initially started on nonrebreather and then switched to BiPAP. On my examination patient was on BiPAP. She was also started on nitro drip for hypertension and pulmonary edema. Her creatinine is around 2.12. Troponin of 82 and 83. Review of Systems:     Constitutional:  negative for chills, fevers, sweats  Respiratory: Positive for cough, dyspnea on exertion, shortness of breath. Negative for wheezing, throat swelling. Cardiovascular: Positive for chest pain and lower extremity edema.  Negative for palpitations  Gastrointestinal:  negative for abdominal pain, constipation, diarrhea, nausea, vomiting  Neurological:  negative for dizziness, headache    Medications: Allergies: Allergies   Allergen Reactions    Adhesive Tape Other (See Comments) and Rash     Other reaction(s): Unknown    Isosorbide Dinitrate Angioedema    Ranexa [Ranolazine] Angioedema    Metformin And Related Diarrhea    Ace Inhibitors Other (See Comments)     Cough, states not good for her kidneys    Iv Dye [Iodides]      Stage 4 kidney disease    Nsaids      CANNOT TAKE D/T KIDNEY DISEASE    Metformin And Related Hives, Itching and Rash     Stage 4 kidney disease.        Current Meds:   Scheduled Meds:    insulin glargine  50 Units SubCUTAneous BID    insulin lispro  0-16 Units SubCUTAneous TID WC    insulin lispro  0-4 Units SubCUTAneous Nightly    ipratropium-albuterol  1 ampule Inhalation Q4H WA    gabapentin  300 mg Oral Nightly    docusate sodium  100 mg Oral BID    [Held by provider] ranolazine  1,000 mg Oral Daily    [Held by provider] isosorbide dinitrate  10 mg Oral TID    aspirin  81 mg Oral Daily    atorvastatin  40 mg Oral Nightly    bumetanide  2 mg IntraVENous BID    tamsulosin  0.4 mg Oral Daily    sodium bicarbonate  1,300 mg Oral BID    pantoprazole  40 mg Oral QAM AC    sodium chloride flush  5-40 mL IntraVENous 2 times per day    heparin (porcine)  5,000 Units SubCUTAneous 3 times per day    carvedilol  25 mg Oral BID    amLODIPine  10 mg Oral Daily     Continuous Infusions:    dextrose      sodium chloride       PRN Meds: benzocaine-menthol, melatonin, melatonin, glucose, dextrose bolus **OR** dextrose bolus, glucagon (rDNA), dextrose, sodium chloride flush, sodium chloride, potassium chloride **OR** potassium alternative oral replacement **OR** potassium chloride, magnesium sulfate, ondansetron **OR** ondansetron, polyethylene glycol, acetaminophen **OR** acetaminophen, labetalol, hydrALAZINE    Data:     Past Medical History:   has a past medical history of Arthritis, Backache, unspecified, Cerebral artery occlusion with cerebral infarction [facility]), CHF (congestive heart failure) ([facility] [address] Chronic kidney disease, Coronary atherosclerosis of artery bypass graft, COVID, Cramp of limb, Gallstones, Hemodialysis patient ([facility] Hyperlipidemia, Hypertension, Insomnia, Neuromuscular disorder ([facility] Pneumonia, Psychiatric problem, Thyroid disease, Type II or unspecified type diabetes mellitus with renal manifestations, not stated as uncontrolled(250.40), Type II or unspecified type diabetes mellitus without mention of complication, not stated as uncontrolled, and Unspecified vitamin D deficiency. Social History:   reports that she has never smoked. She has never used smokeless tobacco. She reports that she does not drink alcohol and does not use drugs. Family History:   Family History   Problem Relation Age of Onset    Diabetes Father     Heart Failure Father        Vitals:  /74   Pulse 80   Temp 97.8 °F (36.6 °C) (Oral)   Resp 19   Ht 5' 5\" (1.651 m)   Wt 252 lb 6.8 oz (114.5 kg)   SpO2 96%   BMI 42.01 kg/m²   Temp (24hrs), Av.7 °F (36.5 °C), Min:97.5 °F (36.4 °C), Max:97.9 °F (36.6 °C)    Recent Labs     23  1220 23  1512 238 23  0641   POCGLU 179* 262* 380* 380*         I/O (24Hr):     Intake/Output Summary (Last 24 hours) at 2023 0736  Last data filed at 2023 0645  Gross per 24 hour   Intake 1730 ml   Output 950 ml   Net 780 ml         Labs:  Hematology:  Recent Labs     23  1059 23  0644 23  1035   WBC 6.8 7.8 5.8   RBC 4.14 3.97 3.76*   HGB 12.9 12.5 11.7*   HCT 41.2 41.7 37.7   MCV 99.5 105.0* 100.3   MCH 31.2 31.5 31.1   MCHC 31.3 30.0 31.0   RDW 16.9* 16.5* 15.9*    181 157   MPV 10.2 10.2 10.8   INR 1.1 1.0  --        Chemistry:  Recent Labs     23  1059 23  1214 23  1452 23  1927 23  0644 23  0309 23  0527     --   --   --  139 141 138   K 4.0  --   --   --  4.0 4.3 5.3   *  --   --   --  107 107 105   CO2 22  --   --   --  22 23 21   GLUCOSE 102*  --    < >  --  162* 178* 411*   BUN 31*  --   --   --  30* 34* 42*   CREATININE 2.12*  --   --   --  1.99* 2.24* 2.63*   MG  --   --   --   --   --  2.0 2.1   ANIONGAP 11  --   --   --  10 11 12   LABGLOM 26*  --   --   --  28* 24* 20*   CALCIUM 8.9  --   --   --  8.5* 8.6 8.6   PHOS  --   --   --   --   --  4.0  --    PROBNP 2,135*  --   --   --   --   --   --    TROPHS 82* 83*  --  72*  --   --   --     < > = values in this interval not displayed. Recent Labs     01/26/23  1911 01/27/23  0752 01/27/23  1220 01/27/23  1512 01/27/23  2038 01/28/23  0641   POCGLU 293* 119* 179* 262* 380* 380*       ABG:  Lab Results   Component Value Date/Time    PHART 7.429 04/24/2022 01:42 PM    DLC4ECI 45.9 04/24/2022 01:42 PM    PO2ART 47.6 04/24/2022 01:42 PM    GNB6SBU 30.4 04/24/2022 01:42 PM    PBEA 6.1 04/24/2022 01:42 PM    O2IFFWKO 82.8 04/24/2022 01:42 PM    FIO2 INFORMATION NOT PROVIDED 01/25/2023 10:59 AM     Lab Results   Component Value Date/Time    SPECIAL RIGHT HAND 2ML 08/03/2022 02:22 AM     Lab Results   Component Value Date/Time    CULTURE NO SIGNIFICANT GROWTH 01/10/2023 02:14 PM       Radiology:  XR CHEST (2 VW)    Result Date: 1/24/2023  Findings suggest congestive heart failure The findings were sent to the Radiology Results [address] at 5:31 pm on 1/24/2023 to be communicated to a licensed caregiver. XR CHEST PORTABLE    Result Date: 1/25/2023  Appearance favoring worsening [facility] vascular congestion/interstitial edema. Physical Examination:        General appearance:  alert, cooperative and no distress  Mental Status:  oriented to person, place and time and normal affect  HEENT: No significant tongue or throat throat edema. Lungs: Clear to auscultation bilaterally however diminished in the bases. Normal effort. Heart:  regular rate and rhythm, no murmur  Abdomen:  soft, nontender, nondistended, bowel sounds are present.   Extremities: 1+ pitting bilateral lower extremity edema, no redness, no tenderness in the calves  Skin:  no gross lesions, rashes, induration    Assessment:        Hospital Problems             Last Modified POA    * (Principal) Acute respiratory distress 1/25/2023 Yes    Type 2 diabetes mellitus with hyperglycemia, with long-term current use of insulin ([facility] [address] 1/25/2023 Yes    Hypertensive urgency 1/25/2023 Yes    Hypoxia 1/25/2023 Yes    Congestive heart failure ([facility] [address] 1/26/2023 Yes    Nephrotic range proteinuria 1/26/2023 Yes    Atherosclerosis of coronary artery bypass graft of native heart without angina pectoris 1/25/2023 Yes    Acute on chronic diastolic heart failure ([facility] [address] 1/25/2023 Yes    Diabetic polyneuropathy associated with type 2 diabetes mellitus ([facility] [address] 1/25/2023 Yes    History of coronary artery bypass graft 1/25/2023 Yes    Mixed hyperlipidemia 1/25/2023 Yes    Stage 4 chronic kidney disease ([facility] [address] 1/25/2023 Yes    Morbid obesity with BMI of 40.0-44.9, adult ([facility] [address] 1/25/2023 Yes   Plan:        Laryngeal edema. Resolved. Angioedema versus fluid overload as patient states she was lying flat the night prior to swelling. However did respond to Decadron and Benadryl. Will avoid isosorbide dinitrate and Ranexa at this time. Acute respiratory failure with hypoxia. Secondary to #3. Currently saturating well on 4 L nasal cannula and patient states she uses 3 L intermittently at home as needed. Acute on chronic diastolic congestive heart failure. Echocardiogram reviewed from 8/2/2022 with EF 55% and no obvious wall motion abnormalities. Wean off nitro infusion. Continue Bumex 2 mg IV twice daily this morning, Coreg 25 mg twice daily. Hold isosorbide dinitrate and Ranexa due to suspected hypersensitivity/angioedema. Elevated troponin. Troponin mildly elevated above baseline with complaints of chest pressure. Cardiology consulted.   Echocardiogram with left ventricular ejection fraction 50 to 55% and indeterminate diastolic dysfunction. Elevated troponin likely secondary to AURELIA and CKD. Hypertension. Continue amlodipine 10 mg daily, Coreg 25 mg twice daily. Stage IV CKD. Follows outpatient with Dr. Zhen Peng. Creatinine stable. Nephrology following due to patient's history of requiring hemodialysis in the past.    Type 2 diabetes mellitus. Hemoglobin A1c 7.1 1/19/2023. Continue Lantus but increase to 50 units twice daily. High-dose corrective algorithm POCT glucose and hypoglycemia protocol. Hyperlipidemia. Continue Lipitor 40 mg nightly. CAD status post CABG. continue aspirin 81 mg daily, Lipitor 40 mg nightly, Coreg 25 mg twice daily, Ranexa 100 mg daily. Morbid obesity. Encourage lifestyle modification with diet and exercise. DVT prophylaxis: Heparin 5000 units subcutaneously every 8 hours. GI prophylaxis: Protonix 40 mg daily. Discharge planning: Remains edematous with decent but not significant urine output. Awaiting nephrology recommendations for diuresis.     Arslan Farias DO  1/28/2023  7:36 AM

## 2023-01-28 NOTE — PLAN OF CARE
Problem: Respiratory - Adult  Goal: Achieves optimal ventilation and oxygenation  1/28/2023 0724 by Nicholas Alarcon RCP  Outcome: Progressing   BRONCHOSPASM/BRONCHOCONSTRICTION     [x]         IMPROVE AERATION/BREATH SOUNDS  [x]   ADMINISTER BRONCHODILATOR THERAPY AS APPROPRIATE  [x]   ASSESS BREATH SOUNDS  []   IMPLEMENT AEROSOL/MDI PROTOCOL  [x]   PATIENT EDUCATION AS NEEDED   PROVIDE ADEQUATE OXYGENATION WITH ACCEPTABLE SP02/ABG'S    [x]  IDENTIFY APPROPRIATE OXYGEN THERAPY  [x]   MONITOR SP02/ABG'S AS NEEDED   [x]   PATIENT EDUCATION AS NEEDED

## 2023-01-28 NOTE — PROGRESS NOTES
Nephrology Progress Note      SUBJECTIVE      Patient seen evaluated at bedside. No acute events noted overnight. Vital signs stable. Patient was given extra dose of diuretic yesterday to help with fluid overload. Secondary possibly to Imdur and/or ranolazine? Patient did receive Decadron and IV Benadryl. Urine output of last 24 hours 950 mL. Continues on Bumex IV 2 mg daily. Sodium bicarb 1.3 g twice daily. Lab work reviewed from this morning creatinine 2.6 up from 2.2 yesterday. Potassium 5.3. Hemoglobin stable 11.7. OBJECTIVE      CURRENT TEMPERATURE:  Temp: 97.8 °F (36.6 °C)  MAXIMUM TEMPERATURE OVER 24HRS:  Temp (24hrs), Av.8 °F (36.6 °C), Min:97.5 °F (36.4 °C), Max:97.9 °F (36.6 °C)    CURRENT RESPIRATORY RATE:  Resp: 19  CURRENT PULSE:  Heart Rate: 80  CURRENT BLOOD PRESSURE:  BP: 139/63  24HR BLOOD PRESSURE RANGE:  Systolic (00WEW), FWU:154 , Min:122 , HMM:788   ; Diastolic (31WJB), PXS:64, Min:59, Max:121    24HR INTAKE/OUTPUT:    Intake/Output Summary (Last 24 hours) at 2023 0918  Last data filed at 2023 0645  Gross per 24 hour   Intake 1550 ml   Output 950 ml   Net 600 ml       WEIGHT :Patient Vitals for the past 96 hrs (Last 3 readings):   Weight   23 0655 252 lb 6.8 oz (114.5 kg)   23 0200 244 lb (110.7 kg)       PHYSICAL EXAM      Constitutional: No fever, no chills, no lethargy, + weakness. HEENT:           No headache, otalgia, itchy eyes, nasal discharge or sore throat. Cardiac:           No chest pain, + exertional dyspnea, no orthopnea or PND. Chest:              No cough, phlegm or wheezing. Abdomen:        No abdominal pain, nausea or vomiting. Neuro:               no focal weakness, abnormal movements orseizure like activity. Skin:                No rashes, no itching. :                  No hematuria, no pyuria, no dysuria, no flank pain. Extremities:     + swelling or joint pains.     CURRENT MEDICATIONS      insulin glargine (LANTUS) injection vial 50 Units, BID  insulin lispro (HUMALOG) injection vial 0-16 Units, TID WC  insulin lispro (HUMALOG) injection vial 0-4 Units, Nightly  ipratropium-albuterol (DUONEB) nebulizer solution 1 ampule, Q4H WA  benzocaine-menthol (CEPACOL SORE THROAT) lozenge 1 lozenge, Q2H PRN  gabapentin (NEURONTIN) capsule 300 mg, Nightly  melatonin tablet 3 mg, Nightly PRN  melatonin tablet 10 mg, Nightly PRN  glucose chewable tablet 16 g, PRN  dextrose bolus 10% 125 mL, PRN   Or  dextrose bolus 10% 250 mL, PRN  glucagon (rDNA) injection 1 mg, PRN  dextrose 10 % infusion, Continuous PRN  docusate sodium (COLACE) capsule 100 mg, BID  [Held by provider] ranolazine (RANEXA) extended release tablet 1,000 mg, Daily  [Held by provider] isosorbide dinitrate (ISORDIL) tablet 10 mg, TID  aspirin chewable tablet 81 mg, Daily  atorvastatin (LIPITOR) tablet 40 mg, Nightly  bumetanide (BUMEX) injection 2 mg, BID  tamsulosin (FLOMAX) capsule 0.4 mg, Daily  sodium bicarbonate tablet 1,300 mg, BID  pantoprazole (PROTONIX) tablet 40 mg, QAM AC  sodium chloride flush 0.9 % injection 5-40 mL, 2 times per day  sodium chloride flush 0.9 % injection 10 mL, PRN  0.9 % sodium chloride infusion, PRN  potassium chloride (KLOR-CON M) extended release tablet 40 mEq, PRN   Or  potassium bicarb-citric acid (EFFER-K) effervescent tablet 40 mEq, PRN   Or  potassium chloride 10 mEq/100 mL IVPB (Peripheral Line), PRN  magnesium sulfate 1000 mg in dextrose 5% 100 mL IVPB, PRN  ondansetron (ZOFRAN-ODT) disintegrating tablet 4 mg, Q8H PRN   Or  ondansetron (ZOFRAN) injection 4 mg, Q6H PRN  polyethylene glycol (GLYCOLAX) packet 17 g, Daily PRN  acetaminophen (TYLENOL) tablet 650 mg, Q6H PRN   Or  acetaminophen (TYLENOL) suppository 650 mg, Q6H PRN  heparin (porcine) injection 5,000 Units, 3 times per day  labetalol (NORMODYNE;TRANDATE) injection 10 mg, Q6H PRN  carvedilol (COREG) tablet 25 mg, BID  amLODIPine (NORVASC) tablet 10 mg, Daily  hydrALAZINE (APRESOLINE) injection 10 mg, Q6H PRN        LABS      CBC:   Recent Labs     01/25/23  1059 01/26/23  0644 01/27/23  1035   WBC 6.8 7.8 5.8   RBC 4.14 3.97 3.76*   HGB 12.9 12.5 11.7*   HCT 41.2 41.7 37.7   MCV 99.5 105.0* 100.3   MCH 31.2 31.5 31.1   MCHC 31.3 30.0 31.0   RDW 16.9* 16.5* 15.9*    181 157   MPV 10.2 10.2 10.8        BMP:   Recent Labs     01/26/23  0644 01/27/23  0309 01/28/23  0527    141 138   K 4.0 4.3 5.3    107 105   CO2 22 23 21   BUN 30* 34* 42*   CREATININE 1.99* 2.24* 2.63*   GLUCOSE 162* 178* 411*   CALCIUM 8.5* 8.6 8.6        PHOSPHORUS:    Recent Labs     01/27/23  0309   PHOS 4.0       MAGNESIUM:   Recent Labs     01/27/23  0309 01/28/23  0527   MG 2.0 2.1           RUSSELL:   Lab Results   Component Value Date    RUSSELL NEGATIVE 08/03/2022       SPEP:   Lab Results   Component Value Date/Time    PROT 7.0 12/01/2022 10:25 AM     UPEP:   Lab Results   Component Value Date/Time    TPU 20 11/12/2021 10:30 AM      HEPBSAG:  Lab Results   Component Value Date/Time    HEPBSAG NONREACTIVE 03/30/2022 03:30 PM     HEPCAB:  Lab Results   Component Value Date/Time    HEPCAB NONREACTIVE 03/30/2022 03:30 PM       URINE SODIUM:    Lab Results   Component Value Date/Time    JASON 47 11/14/2021 02:04 AM      URINE CREATININE:    Lab Results   Component Value Date/Time    LABCREA 65.4 01/26/2023 01:10 PM         URINALYSIS:  U/A:   Lab Results   Component Value Date/Time    NITRU NEGATIVE 01/10/2023 02:14 PM    COLORU Yellow 01/10/2023 02:14 PM    PHUR 7.0 01/10/2023 02:14 PM    WBCUA 2 TO 5 01/10/2023 02:14 PM    RBCUA 0 TO 2 01/10/2023 02:14 PM    MUCUS 2+ 07/31/2022 12:00 PM    TRICHOMONAS NOT REPORTED 11/14/2021 02:05 AM    YEAST FEW 07/02/2022 09:10 AM    BACTERIA FEW 01/10/2023 02:14 PM    SPECGRAV 1.021 01/10/2023 02:14 PM    LEUKOCYTESUR SMALL 01/10/2023 02:14 PM    UROBILINOGEN Normal 01/10/2023 02:14 PM    BILIRUBINUR NEGATIVE 01/10/2023 02:14 PM    GLUCOSEU NEGATIVE 01/10/2023 02:14 PM    KETUA NEGATIVE 01/10/2023 02:14 PM    AMORPHOUS 2+ 07/31/2022 12:00 PM     Repeat random urine protein creatinine ratio is approximately at 3.1 g.  As an outpatient it was quantified somewhere around 5 g. RADIOLOGY      XR CHEST (2 VW)    Result Date: 1/24/2023  EXAMINATION: TWO XRAY VIEWS OF THE CHEST 1/24/2023 4:25 pm COMPARISON: 06/18/2022 HISTORY: ORDERING SYSTEM PROVIDED HISTORY: Hypoxemia TECHNOLOGIST PROVIDED HISTORY: worsening of shortness of breath and hypoxemia Reason for Exam: pt state SOB x 1 yr , chest pressure FINDINGS: Status post median sternotomy. Cardiomegaly. Pulmonary vascular congestion. Pulmonary edema. No focal airspace disease. No pneumothorax. Findings suggest congestive heart failure The findings were sent to the Radiology Results [address] at 5:31 pm on 1/24/2023 to be communicated to a licensed caregiver. XR CHEST PORTABLE    Result Date: 1/27/2023  EXAMINATION: ONE XRAY VIEW OF THE CHEST 1/27/2023 9:45 am COMPARISON: 01/25/2023 HISTORY: ORDERING SYSTEM PROVIDED HISTORY: dyspnea TECHNOLOGIST PROVIDED HISTORY: Dyspnea FINDINGS: Median sternotomy wires are noted. Cardiomegaly, vascular congestion, and small pleural effusions are not significantly changed. No evidence of pneumothorax. No significant interval change. Vascular congestion and small pleural effusions. XR CHEST PORTABLE    Result Date: 1/25/2023  EXAMINATION: ONE XRAY VIEW OF THE CHEST 1/25/2023 11:20 am COMPARISON: 24 January 2023 HISTORY: ORDERING SYSTEM PROVIDED HISTORY: pulm edema TECHNOLOGIST PROVIDED HISTORY: pulm edema Reason for Exam: uprt port chest FINDINGS: AP portable view of the chest time stamped at 1102 hours demonstrates overlying cardiac monitoring electrodes and prior median sternotomy. Cardiomegaly and vascular congestion are noted with bilateral effusions with mild interval worsening since prior study. No extrapleural air.   Osseous structures are age-appropriate. Appearance favoring worsening [place] vascular congestion/interstitial edema. ASSESSMENT      1. Stage IV CKD likely from underlying diabetic renal involvement. Was on dialysis for several months, got off dialysis approximately 5-1/2 months ago. 2.  Admitted with weight gain, worsening edema. I feel this is a combination of CHF and worsening proteinuria. She has had anywhere between 3 to 5 g of proteinuria likely secondary to either diabetic renal disease versus FSGS. 3.  Diabetes mellitus type 2  4. Chronic hypertension  5. Diabetic polyneuropathy  6. History of mild diastolic heart failure, preserved LV function based on last cardiac echo    PLAN    To continue diuresis. 2.  CXR in am   2. Creatinine worsened from yesterday now 2.6. 3.  Do not recommend restarting ARB at this time. 4.  Strict I's and O's and daily weights recorded in the chart  5. Following. Please do not hesitate to call with questions. This note is created with the assistance of a speech-recognition program. While intending to generate a document that actually reflects the content of the visit, no guarantees can be provided that every mistake has been identified and corrected by editing    Electronically signed by Cintia Zavaleta CNP, APRN - CNP on 1/28/2023 at 9:24 AM   Attending Physician Statement  I have discussed the care of Isac Humphries, including pertinent history and exam findings with the resident/fellow. I have reviewed the key elements of all parts of the encounter with the resident/fellow. I have seen and examined the patient with the resident/fellow. I agree with the assessment and plan and status of the problem list as documented. Very long and detailed discussion with patient and patient's sister over the phone was conducted. Numerous questions were addressed. Patient continues to exhibit shortness of breath and her respiratory status is still not back to baseline.   Still on 4 L of O2. Cardiac echo shows preserved LV function, I think this is all diastolic heart failure. I suspect that her volume issues are being compounded by significant proteinuria and sodium avid state. I will leave her on IV diuretics and repeat her chest x-ray tomorrow. I even touched on the fact that dialysis may become necessary if her respiratory status does not improve or if diuretic resistance becomes an increasing issue. Her sugars are clearly not well controlled at this present time either.     Estevan Foster MD , MD

## 2023-01-28 NOTE — PROGRESS NOTES
Pt placed on CPAP of 8 @ 35%. Pt feels much more comfortable and more likely to wear the unit. RN aware.

## 2023-01-29 ENCOUNTER — APPOINTMENT (OUTPATIENT)
Dept: GENERAL RADIOLOGY | Age: 63
DRG: 291 | End: 2023-01-29
Payer: COMMERCIAL

## 2023-01-29 LAB
ANION GAP SERPL CALCULATED.3IONS-SCNC: 11 MMOL/L (ref 9–17)
BUN BLDV-MCNC: 49 MG/DL (ref 8–23)
CALCIUM SERPL-MCNC: 8.4 MG/DL (ref 8.6–10.4)
CHLORIDE BLD-SCNC: 107 MMOL/L (ref 98–107)
CO2: 23 MMOL/L (ref 20–31)
CREAT SERPL-MCNC: 2.89 MG/DL (ref 0.5–0.9)
GFR SERPL CREATININE-BSD FRML MDRD: 18 ML/MIN/1.73M2
GLUCOSE BLD-MCNC: 106 MG/DL (ref 65–105)
GLUCOSE BLD-MCNC: 128 MG/DL (ref 70–99)
GLUCOSE BLD-MCNC: 158 MG/DL (ref 65–105)
GLUCOSE BLD-MCNC: 161 MG/DL (ref 65–105)
GLUCOSE BLD-MCNC: 199 MG/DL (ref 65–105)
GLUCOSE BLD-MCNC: 96 MG/DL (ref 65–105)
MAGNESIUM: 2.1 MG/DL (ref 1.6–2.6)
POTASSIUM SERPL-SCNC: 4.4 MMOL/L (ref 3.7–5.3)
PRO-BNP: 2093 PG/ML
SODIUM BLD-SCNC: 141 MMOL/L (ref 135–144)

## 2023-01-29 PROCEDURE — 6360000002 HC RX W HCPCS: Performed by: STUDENT IN AN ORGANIZED HEALTH CARE EDUCATION/TRAINING PROGRAM

## 2023-01-29 PROCEDURE — 51701 INSERT BLADDER CATHETER: CPT

## 2023-01-29 PROCEDURE — 6370000000 HC RX 637 (ALT 250 FOR IP): Performed by: NURSE PRACTITIONER

## 2023-01-29 PROCEDURE — 97116 GAIT TRAINING THERAPY: CPT

## 2023-01-29 PROCEDURE — 6360000002 HC RX W HCPCS: Performed by: NURSE PRACTITIONER

## 2023-01-29 PROCEDURE — 6370000000 HC RX 637 (ALT 250 FOR IP): Performed by: CLINICAL NURSE SPECIALIST

## 2023-01-29 PROCEDURE — 71045 X-RAY EXAM CHEST 1 VIEW: CPT

## 2023-01-29 PROCEDURE — 94761 N-INVAS EAR/PLS OXIMETRY MLT: CPT

## 2023-01-29 PROCEDURE — 80048 BASIC METABOLIC PNL TOTAL CA: CPT

## 2023-01-29 PROCEDURE — 94640 AIRWAY INHALATION TREATMENT: CPT

## 2023-01-29 PROCEDURE — 2580000003 HC RX 258: Performed by: NURSE PRACTITIONER

## 2023-01-29 PROCEDURE — 83735 ASSAY OF MAGNESIUM: CPT

## 2023-01-29 PROCEDURE — 2500000003 HC RX 250 WO HCPCS: Performed by: STUDENT IN AN ORGANIZED HEALTH CARE EDUCATION/TRAINING PROGRAM

## 2023-01-29 PROCEDURE — 51798 US URINE CAPACITY MEASURE: CPT

## 2023-01-29 PROCEDURE — 2700000000 HC OXYGEN THERAPY PER DAY

## 2023-01-29 PROCEDURE — 2580000003 HC RX 258: Performed by: STUDENT IN AN ORGANIZED HEALTH CARE EDUCATION/TRAINING PROGRAM

## 2023-01-29 PROCEDURE — 6370000000 HC RX 637 (ALT 250 FOR IP): Performed by: STUDENT IN AN ORGANIZED HEALTH CARE EDUCATION/TRAINING PROGRAM

## 2023-01-29 PROCEDURE — 99232 SBSQ HOSP IP/OBS MODERATE 35: CPT | Performed by: STUDENT IN AN ORGANIZED HEALTH CARE EDUCATION/TRAINING PROGRAM

## 2023-01-29 PROCEDURE — 2060000000 HC ICU INTERMEDIATE R&B

## 2023-01-29 PROCEDURE — 83880 ASSAY OF NATRIURETIC PEPTIDE: CPT

## 2023-01-29 PROCEDURE — 36415 COLL VENOUS BLD VENIPUNCTURE: CPT

## 2023-01-29 PROCEDURE — 97530 THERAPEUTIC ACTIVITIES: CPT

## 2023-01-29 PROCEDURE — 82947 ASSAY GLUCOSE BLOOD QUANT: CPT

## 2023-01-29 PROCEDURE — 99231 SBSQ HOSP IP/OBS SF/LOW 25: CPT | Performed by: INTERNAL MEDICINE

## 2023-01-29 RX ORDER — TAMSULOSIN HYDROCHLORIDE 0.4 MG/1
0.8 CAPSULE ORAL DAILY
Status: DISCONTINUED | OUTPATIENT
Start: 2023-01-30 | End: 2023-01-29

## 2023-01-29 RX ORDER — INSULIN GLARGINE 100 [IU]/ML
50 INJECTION, SOLUTION SUBCUTANEOUS 2 TIMES DAILY
Status: DISCONTINUED | OUTPATIENT
Start: 2023-01-29 | End: 2023-01-31

## 2023-01-29 RX ORDER — INSULIN GLARGINE 100 [IU]/ML
30 INJECTION, SOLUTION SUBCUTANEOUS ONCE
Status: COMPLETED | OUTPATIENT
Start: 2023-01-29 | End: 2023-01-29

## 2023-01-29 RX ORDER — TAMSULOSIN HYDROCHLORIDE 0.4 MG/1
0.4 CAPSULE ORAL DAILY
Status: DISCONTINUED | OUTPATIENT
Start: 2023-01-30 | End: 2023-02-06 | Stop reason: HOSPADM

## 2023-01-29 RX ADMIN — DOCUSATE SODIUM 100 MG: 100 CAPSULE ORAL at 20:44

## 2023-01-29 RX ADMIN — HEPARIN SODIUM 5000 UNITS: 5000 INJECTION INTRAVENOUS; SUBCUTANEOUS at 00:40

## 2023-01-29 RX ADMIN — BUMETANIDE 2 MG: 0.25 INJECTION INTRAMUSCULAR; INTRAVENOUS at 10:38

## 2023-01-29 RX ADMIN — DOCUSATE SODIUM 100 MG: 100 CAPSULE ORAL at 07:58

## 2023-01-29 RX ADMIN — INSULIN GLARGINE 30 UNITS: 100 INJECTION, SOLUTION SUBCUTANEOUS at 08:03

## 2023-01-29 RX ADMIN — SODIUM CHLORIDE, PRESERVATIVE FREE 10 ML: 5 INJECTION INTRAVENOUS at 07:58

## 2023-01-29 RX ADMIN — FUROSEMIDE 10 MG/HR: 10 INJECTION, SOLUTION INTRAMUSCULAR; INTRAVENOUS at 14:46

## 2023-01-29 RX ADMIN — ASPIRIN 81 MG: 81 TABLET, CHEWABLE ORAL at 07:58

## 2023-01-29 RX ADMIN — CARVEDILOL 25 MG: 25 TABLET, FILM COATED ORAL at 20:44

## 2023-01-29 RX ADMIN — SODIUM BICARBONATE 1300 MG: 648 TABLET ORAL at 07:57

## 2023-01-29 RX ADMIN — Medication 10 MG: at 22:08

## 2023-01-29 RX ADMIN — HEPARIN SODIUM 5000 UNITS: 5000 INJECTION INTRAVENOUS; SUBCUTANEOUS at 18:06

## 2023-01-29 RX ADMIN — ATORVASTATIN CALCIUM 40 MG: 80 TABLET, FILM COATED ORAL at 20:44

## 2023-01-29 RX ADMIN — GABAPENTIN 300 MG: 300 CAPSULE ORAL at 20:44

## 2023-01-29 RX ADMIN — PANTOPRAZOLE SODIUM 40 MG: 40 TABLET, DELAYED RELEASE ORAL at 06:27

## 2023-01-29 RX ADMIN — HEPARIN SODIUM 5000 UNITS: 5000 INJECTION INTRAVENOUS; SUBCUTANEOUS at 07:59

## 2023-01-29 RX ADMIN — INSULIN GLARGINE 50 UNITS: 100 INJECTION, SOLUTION SUBCUTANEOUS at 20:43

## 2023-01-29 RX ADMIN — IPRATROPIUM BROMIDE AND ALBUTEROL SULFATE 1 AMPULE: 2.5; .5 SOLUTION RESPIRATORY (INHALATION) at 15:49

## 2023-01-29 RX ADMIN — CARVEDILOL 25 MG: 25 TABLET, FILM COATED ORAL at 07:56

## 2023-01-29 RX ADMIN — FUROSEMIDE 10 MG/HR: 10 INJECTION, SOLUTION INTRAMUSCULAR; INTRAVENOUS at 23:23

## 2023-01-29 RX ADMIN — IPRATROPIUM BROMIDE AND ALBUTEROL SULFATE 1 AMPULE: 2.5; .5 SOLUTION RESPIRATORY (INHALATION) at 21:01

## 2023-01-29 RX ADMIN — TAMSULOSIN HYDROCHLORIDE 0.4 MG: 0.4 CAPSULE ORAL at 07:56

## 2023-01-29 RX ADMIN — IPRATROPIUM BROMIDE AND ALBUTEROL SULFATE 1 AMPULE: 2.5; .5 SOLUTION RESPIRATORY (INHALATION) at 07:22

## 2023-01-29 RX ADMIN — AMLODIPINE BESYLATE 10 MG: 10 TABLET ORAL at 07:58

## 2023-01-29 RX ADMIN — IPRATROPIUM BROMIDE AND ALBUTEROL SULFATE 1 AMPULE: 2.5; .5 SOLUTION RESPIRATORY (INHALATION) at 11:39

## 2023-01-29 RX ADMIN — SODIUM BICARBONATE 1300 MG: 648 TABLET ORAL at 20:44

## 2023-01-29 NOTE — PROGRESS NOTES
Physical Therapy  Facility/Department: [facility] 4B STEPDOWN  Daily Treatment Note  NAME: Zander Cha  : 1960  MRN: [id number]    Date of Service: 2023    Discharge Recommendations:  Patient would benefit from continued therapy after discharge   PT Equipment Recommendations  Equipment Needed: No    Patient Diagnosis(es): The primary encounter diagnosis was Congestive heart failure, unspecified HF chronicity, unspecified heart failure type ([address] A diagnosis of Hypoxia was also pertinent to this visit. Assessment   Assessment: Pt ambulates with Gigi, mild general unsteadiness, +SOB, fatigues quickly. She reports feeling unsafe returning home IND and understands a rehab stay is necessary. Pt would benefit from continued therapy following d/c to address functional deficits and reinforce proper breathing patterns. Activity Tolerance: Patient limited by endurance; Patient limited by fatigue  Equipment Needed: No     Plan    Physcial Therapy Plan  General Plan: 5-7 times per week  Current Treatment Recommendations: Strengthening;Balance training;Transfer training;Functional mobility training;Home exercise program;Equipment evaluation, education, & procurement;Patient/Caregiver education & training; Safety education & training; Therapeutic activities; Endurance training;Gait training  PT Plan of Care: Daily     Restrictions  Restrictions/Precautions  Restrictions/Precautions: Fall Risk  Required Braces or Orthoses?: No  Position Activity Restriction  Other position/activity restrictions: Up with Assist.  Monitor SpO2. High Anxiety. Subjective   Subjective: Pt sitting in bedside recliner when therapist arrived. She is finishing her lunch, pleasant and cooperative. Excited she was able to void IND.   Pain: no pain reported  Orientation  Overall Orientation Status: Within Normal Limits  Orientation Level: Oriented X4  Cognition  Overall Cognitive Status: WNL     Objective   Vitals  SpO2: 94 % (SpO2=87%-96% during session.)  O2 Device: Nasal cannula (Pt utilized 4L O2 via nc during entire session.)  Bed Mobility Training  Bed Mobility Training: No  Balance  Sitting: Without support  Standing: With support (2WW)  Transfer Training  Transfer Training: Yes  Overall Level of Assistance: Minimum assistance; Additional time  Interventions: Safety awareness training; Tactile cues; Verbal cues  Sit to Stand: Minimum assistance  Stand to Sit: Minimum assistance  Toilet Transfer: Minimum assistance (pt toileted prior to ambulation with Gigi using 2WW for safety)  Gait Training  Gait Training: Yes  Right Side Weight Bearing: Full  Left Side Weight Bearing: Full  Gait  Overall Level of Assistance: Minimum assistance; Additional time; Adaptive equipment  Interventions: Safety awareness training; Tactile cues; Verbal cues  Base of Support: Widened  Speed/Annie: Slow  Step Length: Left shortened;Right shortened  Gait Abnormalities:  (Pt demonstrates mild general unsteadiness, slow pace, improved step height/length with min vc's, extended seated rest break between gait trials)  Distance (ft): 45 Feet (X2)  Assistive Device: Walker, rolling  Wheelchair Management  Wheelchair Management: No  Neuromuscular Education  Neuromuscular Education: No  Weight Bearing  Weight Bearing Technique: No  PT Exercises  Exercise Treatment: pursed lip and diaphragmatic breathing techniques     Safety Devices  Type of Devices: Gait belt;Call light within reach;Nurse notified; All fall risk precautions in place; Bed alarm in place; Left in bed  Restraints  Restraints Initially in Place: No     Goals  Short Term Goals  Time Frame for Short Term Goals: 14 visits  Short Term Goal 1: Pt will ambulate 150 feet with a RW and supervision to increase functional independence. Short Term Goal 2: Pt will demonstrate good- dynamic standing balance to decrease fall risk. Short Term Goal 3: Pt will perform sit<>stand transfer independently.   Short Term Goal 4: Pt will tolerate a 35 minute therapy session to promote increased endurance. Short Term Goal 5: Pt will perform supine<>sit transfer with supervision  Additional Goals?: No    Education  Patient Education  Education Given To: Patient  Education Provided: Role of Therapy;Transfer Training;Plan of Care; Fall Prevention Strategies  Education Provided Comments: pursed lip breathing and disphragmatic breathing, pt tends to hold her breath during exertion  Education Method: Verbal  Education Outcome: Verbalized understanding;Demonstrated understanding; Unable to demonstrate understanding    Therapy Time   Individual Concurrent Group Co-treatment   Time In 1200         Time Out 200         Minutes 40                 MELITA Hart, PTA

## 2023-01-29 NOTE — PLAN OF CARE
Problem: Discharge Planning  Goal: Discharge to home or other facility with appropriate resources  1/29/2023 0101 by Sindi Parekh RN  Outcome: Progressing  1/28/2023 1651 by Gómez Zurita RN  Outcome: Progressing     Problem: Pain  Goal: Verbalizes/displays adequate comfort level or baseline comfort level  1/29/2023 0101 by Sindi Parekh RN  Outcome: Progressing  1/28/2023 1651 by Gómez Zurita RN  Outcome: Progressing     Problem: Safety - Adult  Goal: Free from fall injury  1/29/2023 0101 by Sindi Parekh RN  Outcome: Progressing  1/28/2023 1651 by Gómez Zurita RN  Outcome: Progressing     Problem: Chronic Conditions and Co-morbidities  Goal: Patient's chronic conditions and co-morbidity symptoms are monitored and maintained or improved  1/29/2023 0101 by Sindi Parekh RN  Outcome: Progressing  1/28/2023 1651 by Gómez Zurita RN  Outcome: Progressing     Problem: ABCDS Injury Assessment  Goal: Absence of physical injury  Outcome: Progressing     Problem: Respiratory - Adult  Goal: Achieves optimal ventilation and oxygenation  1/29/2023 0101 by Sindi Parekh RN  Outcome: Progressing  1/28/2023 1651 by Gómez Zurita RN  Outcome: Progressing     Problem: Skin/Tissue Integrity  Goal: Absence of new skin breakdown  Description: 1. Monitor for areas of redness and/or skin breakdown  2. Assess vascular access sites hourly  3. Every 4-6 hours minimum:  Change oxygen saturation probe site  4. Every 4-6 hours:  If on nasal continuous positive airway pressure, respiratory therapy assess nares and determine need for appliance change or resting period.   1/29/2023 0101 by Sindi Parekh RN  Outcome: Progressing  1/28/2023 1651 by Gómez Zurita RN  Outcome: Progressing

## 2023-01-29 NOTE — PLAN OF CARE
Problem: Respiratory - Adult  Goal: Achieves optimal ventilation and oxygenation  1/29/2023 0727 by Madhavi Sommer RCP  Outcome: Progressing   BRONCHOSPASM/BRONCHOCONSTRICTION     [x]         IMPROVE AERATION/BREATH SOUNDS  [x]   ADMINISTER BRONCHODILATOR THERAPY AS APPROPRIATE  [x]   ASSESS BREATH SOUNDS  []   IMPLEMENT AEROSOL/MDI PROTOCOL  [x]   PATIENT EDUCATION AS NEEDED   PROVIDE ADEQUATE OXYGENATION WITH ACCEPTABLE SP02/ABG'S    [x]  IDENTIFY APPROPRIATE OXYGEN THERAPY  [x]   MONITOR SP02/ABG'S AS NEEDED   [x]   PATIENT EDUCATION AS NEEDED

## 2023-01-29 NOTE — CARE COORDINATION
Met with patient to discuss transitional planning. Plan is SNF. Freedom of choice provided. Patient would like referrals to [facility] at [address] [place] [address].  Referrals sent as requested

## 2023-01-29 NOTE — PLAN OF CARE
Problem: Discharge Planning  Goal: Discharge to home or other facility with appropriate resources  Outcome: Progressing     Problem: Pain  Goal: Verbalizes/displays adequate comfort level or baseline comfort level  Outcome: Progressing     Problem: Chronic Conditions and Co-morbidities  Goal: Patient's chronic conditions and co-morbidity symptoms are monitored and maintained or improved  Outcome: Not Progressing  Flowsheets (Taken 1/29/2023 1835)  Care Plan - Patient's Chronic Conditions and Co-Morbidity Symptoms are Monitored and Maintained or Improved: Monitor and assess patient's chronic conditions and comorbid symptoms for stability, deterioration, or improvement  Note: Started Lasix drip. Placed Pantoja catheter. Problem: ABCDS Injury Assessment  Goal: Absence of physical injury  Outcome: Progressing     Problem: Respiratory - Adult  Goal: Achieves optimal ventilation and oxygenation  1/29/2023 1835 by Kathy Morocoh RN  Outcome: Progressing     Problem: Chronic Conditions and Co-morbidities  Goal: Patient's chronic conditions and co-morbidity symptoms are monitored and maintained or improved  Outcome: Not Progressing  Flowsheets (Taken 1/29/2023 1835)  Care Plan - Patient's Chronic Conditions and Co-Morbidity Symptoms are Monitored and Maintained or Improved: Monitor and assess patient's chronic conditions and comorbid symptoms for stability, deterioration, or improvement  Note: Started Lasix drip. Placed Pantoja catheter.

## 2023-01-29 NOTE — PROGRESS NOTES
Nephrology Progress Note      SUBJECTIVE    Patient seen evaluated up to chair. Patient has been more mobile over the last 24 hours. Patient's creatinine continues to worsen. Now up to 2.8. Explained to the patient that dialysis may be in the near future. Continues on IV diuretics as previous. Patient continues to have the need for straight catheterization may benefit from Pantoja catheter. Urine output over the last 24 hours 1.4 L. OBJECTIVE      CURRENT TEMPERATURE:  Temp: 97.5 °F (36.4 °C)  MAXIMUM TEMPERATURE OVER 24HRS:  Temp (24hrs), Av.6 °F (36.4 °C), Min:97 °F (36.1 °C), Max:98.2 °F (36.8 °C)    CURRENT RESPIRATORY RATE:  Resp: 19  CURRENT PULSE:  Heart Rate: 55  CURRENT BLOOD PRESSURE:  BP: (!) 127/59  24HR BLOOD PRESSURE RANGE:  Systolic (51AMM), UML:431 , Min:117 , IZV:892   ; Diastolic (69JPW), IMR:66, Min:56, Max:81    24HR INTAKE/OUTPUT:    Intake/Output Summary (Last 24 hours) at 2023 1145  Last data filed at 2023 1000  Gross per 24 hour   Intake 1090 ml   Output 1369 ml   Net -279 ml       WEIGHT :Patient Vitals for the past 96 hrs (Last 3 readings):   Weight   23 0447 255 lb 11.7 oz (116 kg)   23 1211 253 lb 8.5 oz (115 kg)   23 0655 252 lb 6.8 oz (114.5 kg)       PHYSICAL EXAM      Constitutional: No fever, no chills, no lethargy, + weakness. HEENT:           No headache, otalgia, itchy eyes, nasal discharge or sore throat. Cardiac:           No chest pain, + exertional dyspnea, no orthopnea or PND. Chest:              No cough, phlegm or wheezing. Abdomen:        No abdominal pain, nausea or vomiting. Neuro:               no focal weakness, abnormal movements orseizure like activity. Skin:                No rashes, no itching. :                  No hematuria, no pyuria, no dysuria, no flank pain. Extremities:     + swelling or joint pains.     CURRENT MEDICATIONS      insulin glargine (LANTUS) injection vial 50 Units, BID  [START ON 2023] tamsulosin (FLOMAX) capsule 0.4 mg, Daily  insulin lispro (HUMALOG) injection vial 0-16 Units, TID WC  insulin lispro (HUMALOG) injection vial 0-4 Units, Nightly  ipratropium-albuterol (DUONEB) nebulizer solution 1 ampule, Q4H WA  benzocaine-menthol (CEPACOL SORE THROAT) lozenge 1 lozenge, Q2H PRN  gabapentin (NEURONTIN) capsule 300 mg, Nightly  melatonin tablet 3 mg, Nightly PRN  melatonin tablet 10 mg, Nightly PRN  glucose chewable tablet 16 g, PRN  dextrose bolus 10% 125 mL, PRN   Or  dextrose bolus 10% 250 mL, PRN  glucagon (rDNA) injection 1 mg, PRN  dextrose 10 % infusion, Continuous PRN  docusate sodium (COLACE) capsule 100 mg, BID  [Held by provider] ranolazine (RANEXA) extended release tablet 1,000 mg, Daily  [Held by provider] isosorbide dinitrate (ISORDIL) tablet 10 mg, TID  aspirin chewable tablet 81 mg, Daily  atorvastatin (LIPITOR) tablet 40 mg, Nightly  bumetanide (BUMEX) injection 2 mg, BID  sodium bicarbonate tablet 1,300 mg, BID  pantoprazole (PROTONIX) tablet 40 mg, QAM AC  sodium chloride flush 0.9 % injection 5-40 mL, 2 times per day  sodium chloride flush 0.9 % injection 10 mL, PRN  0.9 % sodium chloride infusion, PRN  potassium chloride (KLOR-CON M) extended release tablet 40 mEq, PRN   Or  potassium bicarb-citric acid (EFFER-K) effervescent tablet 40 mEq, PRN   Or  potassium chloride 10 mEq/100 mL IVPB (Peripheral Line), PRN  magnesium sulfate 1000 mg in dextrose 5% 100 mL IVPB, PRN  ondansetron (ZOFRAN-ODT) disintegrating tablet 4 mg, Q8H PRN   Or  ondansetron (ZOFRAN) injection 4 mg, Q6H PRN  polyethylene glycol (GLYCOLAX) packet 17 g, Daily PRN  acetaminophen (TYLENOL) tablet 650 mg, Q6H PRN   Or  acetaminophen (TYLENOL) suppository 650 mg, Q6H PRN  heparin (porcine) injection 5,000 Units, 3 times per day  labetalol (NORMODYNE;TRANDATE) injection 10 mg, Q6H PRN  carvedilol (COREG) tablet 25 mg, BID  amLODIPine (NORVASC) tablet 10 mg, Daily  hydrALAZINE (APRESOLINE) injection 10 mg, Q6H PRN        LABS      CBC:   Recent Labs     01/27/23  1035   WBC 5.8   RBC 3.76*   HGB 11.7*   HCT 37.7   .3   MCH 31.1   MCHC 31.0   RDW 15.9*      MPV 10.8        BMP:   Recent Labs     01/27/23  0309 01/28/23  0527 01/29/23  0608    138 141   K 4.3 5.3 4.4    105 107   CO2 23 21 23   BUN 34* 42* 49*   CREATININE 2.24* 2.63* 2.89*   GLUCOSE 178* 411* 128*   CALCIUM 8.6 8.6 8.4*        PHOSPHORUS:    Recent Labs     01/27/23  0309   PHOS 4.0       MAGNESIUM:   Recent Labs     01/27/23  0309 01/28/23  0527 01/29/23  0608   MG 2.0 2.1 2.1           RUSSELL:   Lab Results   Component Value Date    RUSSELL NEGATIVE 08/03/2022       SPEP:   Lab Results   Component Value Date/Time    PROT 7.0 12/01/2022 10:25 AM     UPEP:   Lab Results   Component Value Date/Time    TPU 20 11/12/2021 10:30 AM      HEPBSAG:  Lab Results   Component Value Date/Time    HEPBSAG NONREACTIVE 03/30/2022 03:30 PM     HEPCAB:  Lab Results   Component Value Date/Time    HEPCAB NONREACTIVE 03/30/2022 03:30 PM       URINE SODIUM:    Lab Results   Component Value Date/Time    JASON 47 11/14/2021 02:04 AM      URINE CREATININE:    Lab Results   Component Value Date/Time    LABCREA 65.4 01/26/2023 01:10 PM         URINALYSIS:  U/A:   Lab Results   Component Value Date/Time    NITRU NEGATIVE 01/10/2023 02:14 PM    COLORU Yellow 01/10/2023 02:14 PM    PHUR 7.0 01/10/2023 02:14 PM    WBCUA 2 TO 5 01/10/2023 02:14 PM    RBCUA 0 TO 2 01/10/2023 02:14 PM    MUCUS 2+ 07/31/2022 12:00 PM    TRICHOMONAS NOT REPORTED 11/14/2021 02:05 AM    YEAST FEW 07/02/2022 09:10 AM    BACTERIA FEW 01/10/2023 02:14 PM    SPECGRAV 1.021 01/10/2023 02:14 PM    LEUKOCYTESUR SMALL 01/10/2023 02:14 PM    UROBILINOGEN Normal 01/10/2023 02:14 PM    BILIRUBINUR NEGATIVE 01/10/2023 02:14 PM    GLUCOSEU NEGATIVE 01/10/2023 02:14 PM    KETUA NEGATIVE 01/10/2023 02:14 PM    AMORPHOUS 2+ 07/31/2022 12:00 PM     Repeat random urine protein creatinine ratio is approximately at 3.1 g.  As an outpatient it was quantified somewhere around 5 g. RADIOLOGY      XR CHEST (2 VW)    Result Date: 1/24/2023  EXAMINATION: TWO XRAY VIEWS OF THE CHEST 1/24/2023 4:25 pm COMPARISON: 06/18/2022 HISTORY: ORDERING SYSTEM PROVIDED HISTORY: Hypoxemia TECHNOLOGIST PROVIDED HISTORY: worsening of shortness of breath and hypoxemia Reason for Exam: pt state SOB x 1 yr , chest pressure FINDINGS: Status post median sternotomy. Cardiomegaly. Pulmonary vascular congestion. Pulmonary edema. No focal airspace disease. No pneumothorax. Findings suggest congestive heart failure The findings were sent to the Radiology Results [address] at 5:31 pm on 1/24/2023 to be communicated to a licensed caregiver. XR CHEST PORTABLE    Result Date: 1/27/2023  EXAMINATION: ONE XRAY VIEW OF THE CHEST 1/27/2023 9:45 am COMPARISON: 01/25/2023 HISTORY: ORDERING SYSTEM PROVIDED HISTORY: dyspnea TECHNOLOGIST PROVIDED HISTORY: Dyspnea FINDINGS: Median sternotomy wires are noted. Cardiomegaly, vascular congestion, and small pleural effusions are not significantly changed. No evidence of pneumothorax. No significant interval change. Vascular congestion and small pleural effusions. XR CHEST PORTABLE    Result Date: 1/25/2023  EXAMINATION: ONE XRAY VIEW OF THE CHEST 1/25/2023 11:20 am COMPARISON: 24 January 2023 HISTORY: ORDERING SYSTEM PROVIDED HISTORY: pulm edema TECHNOLOGIST PROVIDED HISTORY: pulm edema Reason for Exam: uprt port chest FINDINGS: AP portable view of the chest time stamped at 1102 hours demonstrates overlying cardiac monitoring electrodes and prior median sternotomy. Cardiomegaly and vascular congestion are noted with bilateral effusions with mild interval worsening since prior study. No extrapleural air. Osseous structures are age-appropriate. Appearance favoring worsening Ana Lilia vascular congestion/interstitial edema. ASSESSMENT    1.   Stage IV CKD likely from underlying diabetic renal involvement. Was on dialysis for several months, got off dialysis approximately 5-1/2 months ago. Creatinine continues to worsen now 2.8.  2.  Admitted with weight gain, worsening edema. I feel this is a combination of CHF and worsening proteinuria. She has had anywhere between 3 to 5 g of proteinuria likely secondary to either diabetic renal disease versus FSGS. 3.  Diabetes mellitus type 2  4. Chronic hypertension  5. Diabetic polyneuropathy  6. History of mild diastolic heart failure, preserved LV function based on last cardiac echo    PLAN      1. To continue diuresis. 2.  No change in chest x-ray from previous. 2.  Creatinine worsened from yesterday now 2.8. 3.  Do not recommend restarting ARB at this time. 4.  Strict I's and O's and daily weights recorded in the chart  5. Following. Please do not hesitate to call with questions. This note is created with the assistance of a speech-recognition program. While intending to generate a document that actually reflects the content of the visit, no guarantees can be provided that every mistake has been identified and corrected by editing    Electronically signed by Mc Lozoya CNP, APRN - CNP on 1/29/2023 at 11:45 AM     Attending Physician Statement  I have discussed the care of Shelly Valenzuela, including pertinent history and exam findings with the resident/fellow. I have reviewed the key elements of all parts of the encounter with the resident/fellow. I have seen and examined the patient with the resident/fellow. I agree with the assessment and plan and status of the problem list as documented. Long discussion with patient and patient's sister conducted over the phone once again. I have looked at her x-rays, her x-ray continues to show congestion. She still gets significantly short of breath on mild exertion. Her serum creatinine is 2.8 today. Her urine output is barely adequate on IV Bumex at this time.   I have elected to start her on IV Lasix infusion. She might need a Pantoja for 24 to 48 hours to closely monitor how her urine output pans out. She is aware of the fact that the hemodialysis may become necessary if inadequate diuresis continues to be an issue. I do feel that her nephrosis is making her somewhat more diuretic resistant and she has developed CRS  Will continue to follow and make additional recommendations.   Patient is not ready for discharge    Jun Macario MD , MD

## 2023-01-29 NOTE — PROGRESS NOTES
[facility]  Office: [address], DO, Alvaro Floras, DO, Efraín Ingram, DO, Shiva Boone Blood, DO, Lina Cali MD, Bernadette Oliver MD, Karen Hunt MD, Wale Zimmerman MD,  aMrni Olivas MD, Jarad Munoz MD, Neftaly Saab, DO, Franklyn Manzanares MD,  Denzel Kawasaki, MD, Zenaida Sanz MD, Walt Begum, DO, Michael Horta MD, Crystal Cheatham MD, Roman Sandifer, DO, Greer Champagne MD, Enma Ghosh MD, Lucy Herron MD, Jennifer Segovia MD, Jose Villarreal, DO, Martha Rose MD, Young Rust MD, Tricia Carroll, Elissa Pop, CNP, Izaiah Reddy, CNP, Anne Church, CNP,  Angela Cai, [facility], Nina Figueroa, CNP, Kinza Araujo, CNP, Quinn Zavala, CNP, Li Lomas, CNP, Otoniel Guzman, CNP, Mallory Mix PA-C, Eldon Daniels, CNS, Sophie Sharif, CNP, Werner Corado, CNP         Enrike Garner [address]    Progress Note    1/29/2023    7:56 AM    Name:   Karina Lind  MRN:     [id number]     Acct:      [de-identified]   Room:   [id number]   Day:  4  Admit Date:  1/25/2023 10:13 AM    PCP:   AFSANEH Albarado CNP  Code Status:  Full Code    Subjective:     C/C:   Chief Complaint   Patient presents with    Shortness of Breath    Weight Gain     Interval History Status: improved. Vitals reviewed, afebrile and hemodynamically stable. Initially saturating well on 4 L nasal cannula. Labs reviewed, creatinine continues to climb but blood sugars better controlled. Overnight patient required straight cath. On examination patient seen comfortably in bed. Continues to complain of edema and shortness of breath. Urine output better 1.4 L in the past 24 hours. We will add bladder scan every 6 hours with instructions to straight cath if greater than 300 mL. Possible Pantoja placement if requiring multiple straight caths. Urinary retention likely secondary to debility.     Brief History:     Jessenia Staples Ericka Waldron is a 57 y.o. Non- / non  female who presents with Shortness of Breath and Weight Gain   and is admitted to the hospital for the management of Acute respiratory distress. 57year-old female with known medical history of diastolic heart failure, CKD, hypertension, hyperlipidemia, type 2 diabetes mellitus, coronary artery disease with history of CABG presents to the hospital with worsening shortness of breath and leg swelling for last 1 month. Patient states that she was on dialysis after she got contrast for a CT PE scan and was recently taken off dialysis in August 2022 by Dr. Mauri Damian. Her kidney numbers have remained stable after getting off dialysis. Patient reports that she started noticing increasing swelling of her lower extremities and increasing shortness of breath on minimal exertion. Patient followed up with her PCP who advised patient to get chest x-ray done which showed congestion. Patient was then asked by PCP to come to the hospital.     In the ER patient was noted to be hypertensive with blood pressure 186/88, she was hypoxic saturating 78% on room air. She was initially started on nonrebreather and then switched to BiPAP. On my examination patient was on BiPAP. She was also started on nitro drip for hypertension and pulmonary edema. Her creatinine is around 2.12. Troponin of 82 and 83. Review of Systems:     Constitutional:  negative for chills, fevers, sweats  Respiratory: Positive for cough, dyspnea on exertion, shortness of breath. Negative for wheezing, throat swelling. Cardiovascular: Positive for chest pain and lower extremity edema. Negative for palpitations  Gastrointestinal:  negative for abdominal pain, constipation, diarrhea, nausea, vomiting  Neurological:  negative for dizziness, headache    Medications: Allergies:     Allergies   Allergen Reactions    Adhesive Tape Other (See Comments) and Rash     Other reaction(s): Unknown    Isosorbide Dinitrate Angioedema    Ranexa [Ranolazine] Angioedema    Metformin And Related Diarrhea    Ace Inhibitors Other (See Comments)     Cough, states not good for her kidneys    Iv Dye [Iodides]      Stage 4 kidney disease    Nsaids      CANNOT TAKE D/T KIDNEY DISEASE    Metformin And Related Hives, Itching and Rash     Stage 4 kidney disease.        Current Meds:   Scheduled Meds:    insulin glargine  30 Units SubCUTAneous Once    insulin glargine  50 Units SubCUTAneous BID    insulin lispro  0-16 Units SubCUTAneous TID WC    insulin lispro  0-4 Units SubCUTAneous Nightly    ipratropium-albuterol  1 ampule Inhalation Q4H WA    gabapentin  300 mg Oral Nightly    docusate sodium  100 mg Oral BID    [Held by provider] ranolazine  1,000 mg Oral Daily    [Held by provider] isosorbide dinitrate  10 mg Oral TID    aspirin  81 mg Oral Daily    atorvastatin  40 mg Oral Nightly    bumetanide  2 mg IntraVENous BID    tamsulosin  0.4 mg Oral Daily    sodium bicarbonate  1,300 mg Oral BID    pantoprazole  40 mg Oral QAM AC    sodium chloride flush  5-40 mL IntraVENous 2 times per day    heparin (porcine)  5,000 Units SubCUTAneous 3 times per day    carvedilol  25 mg Oral BID    amLODIPine  10 mg Oral Daily     Continuous Infusions:    dextrose      sodium chloride       PRN Meds: benzocaine-menthol, melatonin, melatonin, glucose, dextrose bolus **OR** dextrose bolus, glucagon (rDNA), dextrose, sodium chloride flush, sodium chloride, potassium chloride **OR** potassium alternative oral replacement **OR** potassium chloride, magnesium sulfate, ondansetron **OR** ondansetron, polyethylene glycol, acetaminophen **OR** acetaminophen, labetalol, hydrALAZINE    Data:     Past Medical History:   has a past medical history of Arthritis, Backache, unspecified, Cerebral artery occlusion with cerebral infarction ([address] CHF (congestive heart failure) ([facility] Chronic kidney disease, Coronary atherosclerosis of artery bypass graft, COVID, Cramp of limb, Gallstones, Hemodialysis patient ([facility] Hyperlipidemia, Hypertension, Insomnia, Neuromuscular disorder ([facility] Pneumonia, Psychiatric problem, Thyroid disease, Type II or unspecified type diabetes mellitus with renal manifestations, not stated as uncontrolled(250.40), Type II or unspecified type diabetes mellitus without mention of complication, not stated as uncontrolled, and Unspecified vitamin D deficiency. Social History:   reports that she has never smoked. She has never used smokeless tobacco. She reports that she does not drink alcohol and does not use drugs. Family History:   Family History   Problem Relation Age of Onset    Diabetes Father     Heart Failure Father        Vitals:  BP (!) 153/72   Pulse 66   Temp 97.5 °F (36.4 °C) (Oral)   Resp 12   Ht 5' 5\" (1.651 m)   Wt 255 lb 11.7 oz (116 kg)   SpO2 100%   BMI 42.56 kg/m²   Temp (24hrs), Av.6 °F (36.4 °C), Min:97 °F (36.1 °C), Max:98.2 °F (36.8 °C)    Recent Labs     23  1617 23  1924 23  0733 23  0740   POCGLU 243* 205* 106* 96         I/O (24Hr):     Intake/Output Summary (Last 24 hours) at 2023 0756  Last data filed at 2023 0300  Gross per 24 hour   Intake 1090 ml   Output 1369 ml   Net -279 ml         Labs:  Hematology:  Recent Labs     23  1035   WBC 5.8   RBC 3.76*   HGB 11.7*   HCT 37.7   .3   MCH 31.1   MCHC 31.0   RDW 15.9*      MPV 10.8       Chemistry:  Recent Labs     23  0309 23  0527 23  0608    138 141   K 4.3 5.3 4.4    105 107   CO2 23 21 23   GLUCOSE 178* 411* 128*   BUN 34* 42* 49*   CREATININE 2.24* 2.63* 2.89*   MG 2.0 2.1 2.1   ANIONGAP 11 12 11   LABGLOM 24* 20* 18*   CALCIUM 8.6 8.6 8.4*   PHOS 4.0  --   --    PROBNP  --   --  2,093*       Recent Labs     23  0641 23  1123 23  1617 23  1924 23  0733 23  0740   POCGLU 380* 329* 243* 205* 106* 96       ABG:  Lab Results   Component Value Date/Time PHART 7.429 04/24/2022 01:42 PM    BSB0HXY 45.9 04/24/2022 01:42 PM    PO2ART 47.6 04/24/2022 01:42 PM    DYU0OHD 30.4 04/24/2022 01:42 PM    PBEA 6.1 04/24/2022 01:42 PM    A1UWVDAW 82.8 04/24/2022 01:42 PM    FIO2 INFORMATION NOT PROVIDED 01/25/2023 10:59 AM     Lab Results   Component Value Date/Time    SPECIAL RIGHT HAND 2ML 08/03/2022 02:22 AM     Lab Results   Component Value Date/Time    CULTURE NO SIGNIFICANT GROWTH 01/10/2023 02:14 PM       Radiology:  XR CHEST (2 VW)    Result Date: 1/24/2023  Findings suggest congestive heart failure The findings were sent to the Radiology Results [id number] at 5:31 pm on 1/24/2023 to be communicated to a licensed caregiver. XR CHEST PORTABLE    Result Date: 1/25/2023  Appearance favoring worsening Emilia Friar vascular congestion/interstitial edema. Physical Examination:        General appearance:  alert, cooperative and no distress  Mental Status:  oriented to person, place and time and normal affect  HEENT: No significant tongue or throat throat edema. Lungs: Clear to auscultation bilaterally however diminished in the bases. Normal effort. Heart:  regular rate and rhythm, no murmur  Abdomen:  soft, nontender, nondistended, bowel sounds are present.   Extremities: 1+ pitting bilateral lower extremity edema, no redness, no tenderness in the calves  Skin:  no gross lesions, rashes, induration    Assessment:        Hospital Problems             Last Modified POA    * (Principal) Acute respiratory distress 1/25/2023 Yes    Type 2 diabetes mellitus with hyperglycemia, with long-term current use of insulin ([address] 1/25/2023 Yes    Hypertensive urgency 1/25/2023 Yes    Hypoxia 1/25/2023 Yes    Congestive heart failure ([address] 1/26/2023 Yes    Nephrotic range proteinuria 1/26/2023 Yes    Atherosclerosis of coronary artery bypass graft of native heart without angina pectoris 1/25/2023 Yes    Acute on chronic diastolic heart failure ([address] 1/25/2023 Yes    Diabetic polyneuropathy associated with type 2 diabetes mellitus ([facility] 1/25/2023 Yes    History of coronary artery bypass graft 1/25/2023 Yes    Mixed hyperlipidemia 1/25/2023 Yes    Stage 4 chronic kidney disease ([facility] 1/25/2023 Yes    Morbid obesity with BMI of 40.0-44.9, adult ([facility] 1/25/2023 Yes   Plan:        Laryngeal edema. Resolved. Angioedema versus fluid overload as patient states she was lying flat the night prior to swelling. However did respond to Decadron and Benadryl. Will avoid isosorbide dinitrate and Ranexa at this time. Acute respiratory failure with hypoxia. Secondary to #3. Currently saturating well on 4 L nasal cannula and patient states she uses 3 L intermittently at home as needed. Acute on chronic diastolic congestive heart failure. Echocardiogram reviewed from 8/2/2022 with EF 55% and no obvious wall motion abnormalities. Wean off nitro infusion. Continue Bumex 2 mg IV twice daily this morning, Coreg 25 mg twice daily. Hold isosorbide dinitrate and Ranexa due to suspected hypersensitivity/angioedema. Elevated troponin. Troponin mildly elevated above baseline with complaints of chest pressure. Cardiology consulted. Echocardiogram with left ventricular ejection fraction 50 to 55% and indeterminate diastolic dysfunction. Elevated troponin likely secondary to AURELIA and CKD. Hypertension. Continue amlodipine 10 mg daily, Coreg 25 mg twice daily. Stage IV CKD. Follows outpatient with Dr. Rashmi Iyer. Creatinine increasing nephrology following due to patient's history of requiring hemodialysis in the past.  Required straight cath. We will BladderScan every 6 hours with plan to straight cath if retaining greater than 300 mL. Type 2 diabetes mellitus. Hemoglobin A1c 7.1 1/19/2023. Continue Lantus but increase to 60 units twice daily. However blood sugar was 96 this morning so adjustments were made. Continue Lantus 50 units twice daily and high-dose corrective algorithm.  POCT glucose and hypoglycemia protocol. Hyperlipidemia. Continue Lipitor 40 mg nightly. CAD status post CABG. continue aspirin 81 mg daily, Lipitor 40 mg nightly, Coreg 25 mg twice daily, Ranexa 100 mg daily. Morbid obesity. Encourage lifestyle modification with diet and exercise. DVT prophylaxis: Heparin 5000 units subcutaneously every 8 hours. GI prophylaxis: Protonix 40 mg daily. Discharge planning: We will continue to monitor urine output as well as creatinine.     Mathieu Guerrero, DO  1/29/2023  7:56 AM

## 2023-01-30 LAB
ANION GAP SERPL CALCULATED.3IONS-SCNC: 10 MMOL/L (ref 9–17)
BUN BLDV-MCNC: 56 MG/DL (ref 8–23)
CALCIUM SERPL-MCNC: 8.3 MG/DL (ref 8.6–10.4)
CHLORIDE BLD-SCNC: 106 MMOL/L (ref 98–107)
CO2: 25 MMOL/L (ref 20–31)
CREAT SERPL-MCNC: 2.91 MG/DL (ref 0.5–0.9)
GFR SERPL CREATININE-BSD FRML MDRD: 17 ML/MIN/1.73M2
GLUCOSE BLD-MCNC: 101 MG/DL (ref 65–105)
GLUCOSE BLD-MCNC: 155 MG/DL (ref 65–105)
GLUCOSE BLD-MCNC: 156 MG/DL (ref 70–99)
GLUCOSE BLD-MCNC: 172 MG/DL (ref 65–105)
GLUCOSE BLD-MCNC: 229 MG/DL (ref 65–105)
MAGNESIUM: 2.1 MG/DL (ref 1.6–2.6)
POTASSIUM SERPL-SCNC: 4.3 MMOL/L (ref 3.7–5.3)
SODIUM BLD-SCNC: 141 MMOL/L (ref 135–144)

## 2023-01-30 PROCEDURE — 2580000003 HC RX 258: Performed by: NURSE PRACTITIONER

## 2023-01-30 PROCEDURE — 83735 ASSAY OF MAGNESIUM: CPT

## 2023-01-30 PROCEDURE — 97110 THERAPEUTIC EXERCISES: CPT

## 2023-01-30 PROCEDURE — 2060000000 HC ICU INTERMEDIATE R&B

## 2023-01-30 PROCEDURE — 99232 SBSQ HOSP IP/OBS MODERATE 35: CPT | Performed by: STUDENT IN AN ORGANIZED HEALTH CARE EDUCATION/TRAINING PROGRAM

## 2023-01-30 PROCEDURE — 6370000000 HC RX 637 (ALT 250 FOR IP): Performed by: CLINICAL NURSE SPECIALIST

## 2023-01-30 PROCEDURE — 6360000002 HC RX W HCPCS: Performed by: NURSE PRACTITIONER

## 2023-01-30 PROCEDURE — 2580000003 HC RX 258: Performed by: STUDENT IN AN ORGANIZED HEALTH CARE EDUCATION/TRAINING PROGRAM

## 2023-01-30 PROCEDURE — 6360000002 HC RX W HCPCS: Performed by: STUDENT IN AN ORGANIZED HEALTH CARE EDUCATION/TRAINING PROGRAM

## 2023-01-30 PROCEDURE — 36415 COLL VENOUS BLD VENIPUNCTURE: CPT

## 2023-01-30 PROCEDURE — 6370000000 HC RX 637 (ALT 250 FOR IP): Performed by: STUDENT IN AN ORGANIZED HEALTH CARE EDUCATION/TRAINING PROGRAM

## 2023-01-30 PROCEDURE — 2580000003 HC RX 258: Performed by: INTERNAL MEDICINE

## 2023-01-30 PROCEDURE — 6360000002 HC RX W HCPCS: Performed by: INTERNAL MEDICINE

## 2023-01-30 PROCEDURE — 97530 THERAPEUTIC ACTIVITIES: CPT

## 2023-01-30 PROCEDURE — 6370000000 HC RX 637 (ALT 250 FOR IP): Performed by: NURSE PRACTITIONER

## 2023-01-30 PROCEDURE — 80048 BASIC METABOLIC PNL TOTAL CA: CPT

## 2023-01-30 PROCEDURE — 82947 ASSAY GLUCOSE BLOOD QUANT: CPT

## 2023-01-30 PROCEDURE — 2700000000 HC OXYGEN THERAPY PER DAY

## 2023-01-30 PROCEDURE — 99232 SBSQ HOSP IP/OBS MODERATE 35: CPT | Performed by: INTERNAL MEDICINE

## 2023-01-30 PROCEDURE — 94640 AIRWAY INHALATION TREATMENT: CPT

## 2023-01-30 PROCEDURE — 6370000000 HC RX 637 (ALT 250 FOR IP): Performed by: INTERNAL MEDICINE

## 2023-01-30 PROCEDURE — 97116 GAIT TRAINING THERAPY: CPT

## 2023-01-30 PROCEDURE — 94761 N-INVAS EAR/PLS OXIMETRY MLT: CPT

## 2023-01-30 RX ORDER — CARVEDILOL 12.5 MG/1
12.5 TABLET ORAL 2 TIMES DAILY
Status: DISCONTINUED | OUTPATIENT
Start: 2023-01-30 | End: 2023-02-06 | Stop reason: HOSPADM

## 2023-01-30 RX ORDER — METOLAZONE 5 MG/1
10 TABLET ORAL ONCE
Status: COMPLETED | OUTPATIENT
Start: 2023-01-30 | End: 2023-01-30

## 2023-01-30 RX ADMIN — IPRATROPIUM BROMIDE AND ALBUTEROL SULFATE 1 AMPULE: 2.5; .5 SOLUTION RESPIRATORY (INHALATION) at 16:47

## 2023-01-30 RX ADMIN — FUROSEMIDE 40 MG/HR: 10 INJECTION, SOLUTION INTRAMUSCULAR; INTRAVENOUS at 16:07

## 2023-01-30 RX ADMIN — TAMSULOSIN HYDROCHLORIDE 0.4 MG: 0.4 CAPSULE ORAL at 07:55

## 2023-01-30 RX ADMIN — FUROSEMIDE 10 MG/HR: 10 INJECTION, SOLUTION INTRAMUSCULAR; INTRAVENOUS at 10:41

## 2023-01-30 RX ADMIN — INSULIN GLARGINE 50 UNITS: 100 INJECTION, SOLUTION SUBCUTANEOUS at 20:08

## 2023-01-30 RX ADMIN — DOCUSATE SODIUM 100 MG: 100 CAPSULE ORAL at 07:56

## 2023-01-30 RX ADMIN — IPRATROPIUM BROMIDE AND ALBUTEROL SULFATE 1 AMPULE: 2.5; .5 SOLUTION RESPIRATORY (INHALATION) at 07:27

## 2023-01-30 RX ADMIN — Medication 10 MG: at 22:32

## 2023-01-30 RX ADMIN — PANTOPRAZOLE SODIUM 40 MG: 40 TABLET, DELAYED RELEASE ORAL at 06:02

## 2023-01-30 RX ADMIN — CARVEDILOL 12.5 MG: 12.5 TABLET, FILM COATED ORAL at 20:08

## 2023-01-30 RX ADMIN — INSULIN LISPRO 4 UNITS: 100 INJECTION, SOLUTION INTRAVENOUS; SUBCUTANEOUS at 12:48

## 2023-01-30 RX ADMIN — ASPIRIN 81 MG: 81 TABLET, CHEWABLE ORAL at 07:56

## 2023-01-30 RX ADMIN — GABAPENTIN 300 MG: 300 CAPSULE ORAL at 20:08

## 2023-01-30 RX ADMIN — HEPARIN SODIUM 5000 UNITS: 5000 INJECTION INTRAVENOUS; SUBCUTANEOUS at 07:56

## 2023-01-30 RX ADMIN — SODIUM CHLORIDE, PRESERVATIVE FREE 10 ML: 5 INJECTION INTRAVENOUS at 07:56

## 2023-01-30 RX ADMIN — SODIUM CHLORIDE, PRESERVATIVE FREE 10 ML: 5 INJECTION INTRAVENOUS at 20:10

## 2023-01-30 RX ADMIN — AMLODIPINE BESYLATE 10 MG: 10 TABLET ORAL at 07:55

## 2023-01-30 RX ADMIN — IPRATROPIUM BROMIDE AND ALBUTEROL SULFATE 1 AMPULE: 2.5; .5 SOLUTION RESPIRATORY (INHALATION) at 20:29

## 2023-01-30 RX ADMIN — SODIUM BICARBONATE 1300 MG: 648 TABLET ORAL at 20:08

## 2023-01-30 RX ADMIN — METOLAZONE 10 MG: 5 TABLET ORAL at 16:08

## 2023-01-30 RX ADMIN — HEPARIN SODIUM 5000 UNITS: 5000 INJECTION INTRAVENOUS; SUBCUTANEOUS at 18:19

## 2023-01-30 RX ADMIN — ATORVASTATIN CALCIUM 40 MG: 80 TABLET, FILM COATED ORAL at 20:08

## 2023-01-30 RX ADMIN — DOCUSATE SODIUM 100 MG: 100 CAPSULE ORAL at 20:08

## 2023-01-30 RX ADMIN — INSULIN GLARGINE 50 UNITS: 100 INJECTION, SOLUTION SUBCUTANEOUS at 08:06

## 2023-01-30 RX ADMIN — SODIUM BICARBONATE 1300 MG: 648 TABLET ORAL at 07:56

## 2023-01-30 RX ADMIN — HEPARIN SODIUM 5000 UNITS: 5000 INJECTION INTRAVENOUS; SUBCUTANEOUS at 23:48

## 2023-01-30 RX ADMIN — IPRATROPIUM BROMIDE AND ALBUTEROL SULFATE 1 AMPULE: 2.5; .5 SOLUTION RESPIRATORY (INHALATION) at 11:31

## 2023-01-30 RX ADMIN — FUROSEMIDE 10 MG/HR: 10 INJECTION, SOLUTION INTRAMUSCULAR; INTRAVENOUS at 20:30

## 2023-01-30 RX ADMIN — HEPARIN SODIUM 5000 UNITS: 5000 INJECTION INTRAVENOUS; SUBCUTANEOUS at 00:00

## 2023-01-30 ASSESSMENT — PAIN SCALES - GENERAL: PAINLEVEL_OUTOF10: 0

## 2023-01-30 NOTE — PROGRESS NOTES
Nephrology Progress Note      SUBJECTIVE    Patient seen and examined at bedside. Patient has been more mobile over the last 2 days. No acute events overnight. Patient is afebrile and hemodynamically stable. Vital signs are stable. Patient's creatinine continues to worsen. Now up to 2.91. Explained to the patient that dialysis may be needed in the near future. Currently on continuous IV LASIX drip. Patient is now having Pantoja in. Urine output over the last 24 hours 2.5 L. OBJECTIVE      CURRENT TEMPERATURE:  Temp: 97.9 °F (36.6 °C)  MAXIMUM TEMPERATURE OVER 24HRS:  Temp (24hrs), Av.6 °F (36.4 °C), Min:97.3 °F (36.3 °C), Max:98.2 °F (36.8 °C)    CURRENT RESPIRATORY RATE:  Resp: 15  CURRENT PULSE:  Heart Rate: 56  CURRENT BLOOD PRESSURE:  BP: 118/75  24HR BLOOD PRESSURE RANGE:  Systolic (10APD), SQR:890 , Min:117 , ONX:089   ; Diastolic (36SNF), RB, Min:56, Max:75    24HR INTAKE/OUTPUT:    Intake/Output Summary (Last 24 hours) at 2023 1035  Last data filed at 2023 0752  Gross per 24 hour   Intake 800 ml   Output 2627 ml   Net -1827 ml       WEIGHT :Patient Vitals for the past 96 hrs (Last 3 readings):   Weight   23 0525 255 lb 11.7 oz (116 kg)   23 0447 255 lb 11.7 oz (116 kg)   23 1211 253 lb 8.5 oz (115 kg)       PHYSICAL EXAM      Constitutional: No fever, no chills, no lethargy, + weakness. HEENT:           No headache, otalgia, itchy eyes, nasal discharge or sore throat. Cardiac:           No chest pain, + exertional dyspnea, no orthopnea or PND. Chest:              No cough, phlegm or wheezing. Abdomen:        No abdominal pain, nausea or vomiting. Neuro:               no focal weakness, abnormal movements orseizure like activity. Skin:                No rashes, no itching. :                  No hematuria, no pyuria, no dysuria, no flank pain. Extremities:     + swelling or joint pains.     CURRENT MEDICATIONS      carvedilol (COREG) tablet 12.5 mg, BID  insulin glargine (LANTUS) injection vial 50 Units, BID  tamsulosin (FLOMAX) capsule 0.4 mg, Daily  furosemide (LASIX) 100 mg in sodium chloride 0.9 % 100 mL infusion, Continuous  insulin lispro (HUMALOG) injection vial 0-16 Units, TID WC  insulin lispro (HUMALOG) injection vial 0-4 Units, Nightly  ipratropium-albuterol (DUONEB) nebulizer solution 1 ampule, Q4H WA  benzocaine-menthol (CEPACOL SORE THROAT) lozenge 1 lozenge, Q2H PRN  gabapentin (NEURONTIN) capsule 300 mg, Nightly  melatonin tablet 3 mg, Nightly PRN  melatonin tablet 10 mg, Nightly PRN  glucose chewable tablet 16 g, PRN  dextrose bolus 10% 125 mL, PRN   Or  dextrose bolus 10% 250 mL, PRN  glucagon (rDNA) injection 1 mg, PRN  dextrose 10 % infusion, Continuous PRN  docusate sodium (COLACE) capsule 100 mg, BID  [Held by provider] ranolazine (RANEXA) extended release tablet 1,000 mg, Daily  [Held by provider] isosorbide dinitrate (ISORDIL) tablet 10 mg, TID  aspirin chewable tablet 81 mg, Daily  atorvastatin (LIPITOR) tablet 40 mg, Nightly  sodium bicarbonate tablet 1,300 mg, BID  pantoprazole (PROTONIX) tablet 40 mg, QAM AC  sodium chloride flush 0.9 % injection 5-40 mL, 2 times per day  sodium chloride flush 0.9 % injection 10 mL, PRN  0.9 % sodium chloride infusion, PRN  potassium chloride (KLOR-CON M) extended release tablet 40 mEq, PRN   Or  potassium bicarb-citric acid (EFFER-K) effervescent tablet 40 mEq, PRN   Or  potassium chloride 10 mEq/100 mL IVPB (Peripheral Line), PRN  magnesium sulfate 1000 mg in dextrose 5% 100 mL IVPB, PRN  ondansetron (ZOFRAN-ODT) disintegrating tablet 4 mg, Q8H PRN   Or  ondansetron (ZOFRAN) injection 4 mg, Q6H PRN  polyethylene glycol (GLYCOLAX) packet 17 g, Daily PRN  acetaminophen (TYLENOL) tablet 650 mg, Q6H PRN   Or  acetaminophen (TYLENOL) suppository 650 mg, Q6H PRN  heparin (porcine) injection 5,000 Units, 3 times per day  labetalol (NORMODYNE;TRANDATE) injection 10 mg, Q6H PRN  amLODIPine (NORVASC) tablet 10 mg, Daily  hydrALAZINE (APRESOLINE) injection 10 mg, Q6H PRN        LABS      CBC:   No results for input(s): WBC, RBC, HGB, HCT, MCV, MCH, MCHC, RDW, PLT, MPV in the last 72 hours. BMP:   Recent Labs     01/28/23  0527 01/29/23  0608 01/30/23  0207    141 141   K 5.3 4.4 4.3    107 106   CO2 21 23 25   BUN 42* 49* 56*   CREATININE 2.63* 2.89* 2.91*   GLUCOSE 411* 128* 156*   CALCIUM 8.6 8.4* 8.3*        PHOSPHORUS:    No results for input(s): PHOS in the last 72 hours.     MAGNESIUM:   Recent Labs     01/28/23  0527 01/29/23  0608 01/30/23  0207   MG 2.1 2.1 2.1           RUSSELL:   Lab Results   Component Value Date    RUSSELL NEGATIVE 08/03/2022       SPEP:   Lab Results   Component Value Date/Time    PROT 7.0 12/01/2022 10:25 AM     UPEP:   Lab Results   Component Value Date/Time    TPU 20 11/12/2021 10:30 AM      HEPBSAG:  Lab Results   Component Value Date/Time    HEPBSAG NONREACTIVE 03/30/2022 03:30 PM     HEPCAB:  Lab Results   Component Value Date/Time    HEPCAB NONREACTIVE 03/30/2022 03:30 PM       URINE SODIUM:    Lab Results   Component Value Date/Time    JASON 47 11/14/2021 02:04 AM      URINE CREATININE:    Lab Results   Component Value Date/Time    LABCREA 65.4 01/26/2023 01:10 PM         URINALYSIS:  U/A:   Lab Results   Component Value Date/Time    NITRU NEGATIVE 01/10/2023 02:14 PM    COLORU Yellow 01/10/2023 02:14 PM    PHUR 7.0 01/10/2023 02:14 PM    WBCUA 2 TO 5 01/10/2023 02:14 PM    RBCUA 0 TO 2 01/10/2023 02:14 PM    MUCUS 2+ 07/31/2022 12:00 PM    TRICHOMONAS NOT REPORTED 11/14/2021 02:05 AM    YEAST FEW 07/02/2022 09:10 AM    BACTERIA FEW 01/10/2023 02:14 PM    SPECGRAV 1.021 01/10/2023 02:14 PM    LEUKOCYTESUR SMALL 01/10/2023 02:14 PM    UROBILINOGEN Normal 01/10/2023 02:14 PM    BILIRUBINUR NEGATIVE 01/10/2023 02:14 PM    GLUCOSEU NEGATIVE 01/10/2023 02:14 PM    KETUA NEGATIVE 01/10/2023 02:14 PM    AMORPHOUS 2+ 07/31/2022 12:00 PM     Repeat random urine protein creatinine ratio is approximately at 3.1 g.  As an outpatient it was quantified somewhere around 5 g. RADIOLOGY      XR CHEST (2 VW)    Result Date: 1/24/2023  EXAMINATION: TWO XRAY VIEWS OF THE CHEST 1/24/2023 4:25 pm COMPARISON: 06/18/2022 HISTORY: ORDERING SYSTEM PROVIDED HISTORY: Hypoxemia TECHNOLOGIST PROVIDED HISTORY: worsening of shortness of breath and hypoxemia Reason for Exam: pt state SOB x 1 yr , chest pressure FINDINGS: Status post median sternotomy. Cardiomegaly. Pulmonary vascular congestion. Pulmonary edema. No focal airspace disease. No pneumothorax. Findings suggest congestive heart failure The findings were sent to the Radiology Results [id number] at 5:31 pm on 1/24/2023 to be communicated to a licensed caregiver. XR CHEST PORTABLE    Result Date: 1/27/2023  EXAMINATION: ONE XRAY VIEW OF THE CHEST 1/27/2023 9:45 am COMPARISON: 01/25/2023 HISTORY: ORDERING SYSTEM PROVIDED HISTORY: dyspnea TECHNOLOGIST PROVIDED HISTORY: Dyspnea FINDINGS: Median sternotomy wires are noted. Cardiomegaly, vascular congestion, and small pleural effusions are not significantly changed. No evidence of pneumothorax. No significant interval change. Vascular congestion and small pleural effusions. XR CHEST PORTABLE    Result Date: 1/25/2023  EXAMINATION: ONE XRAY VIEW OF THE CHEST 1/25/2023 11:20 am COMPARISON: 24 January 2023 HISTORY: ORDERING SYSTEM PROVIDED HISTORY: pulm edema TECHNOLOGIST PROVIDED HISTORY: pulm edema Reason for Exam: uprt port chest FINDINGS: AP portable view of the chest time stamped at 1102 hours demonstrates overlying cardiac monitoring electrodes and prior median sternotomy. Cardiomegaly and vascular congestion are noted with bilateral effusions with mild interval worsening since prior study. No extrapleural air. Osseous structures are age-appropriate. Appearance favoring worsening Ana Lilia vascular congestion/interstitial edema. ASSESSMENT    1.   Stage IV CKD likely from underlying diabetic renal involvement. Was on dialysis for several months, got off dialysis approximately 5-1/2 months ago. Creatinine continues to worsen now 2.91.  2.  Admitted with weight gain, worsening edema. I feel this is a combination of CHF and worsening proteinuria. She has had anywhere between 3 to 5 g of proteinuria likely secondary to either diabetic renal disease versus FSGS. 3.  Diabetes mellitus type 2  4. Chronic hypertension  5. Diabetic polyneuropathy  6. History of mild diastolic heart failure, preserved LV function based on last cardiac echo    PLAN      1. Her urine output is barely adequate on IV Bumex at this time. She has been elected to start  on IV Lasix infusion. She might need Pantoja for 24 to 48 hours to closely monitor how her urine output pans out. She is aware of the fact that the hemodialysis may become necessary if inadequate diuresis continues to be an issue. 2.  No change in chest x-ray from previous. 2.  Creatinine worsened from yesterday now 2.91.  3.  Do not recommend restarting ARB at this time. 4.  Strict I's and O's and daily weights recorded in the chart  5. Following. Please do not hesitate to call with questions. This note is created with the assistance of a speech-recognition program. While intending to generate a document that actually reflects the content of the visit, no guarantees can be provided that every mistake has been identified and corrected by editing    Elian Mina MD  Internal medicine resident, PGY 2  Nephrology service  [facility], [facility]  1/30/2023      Attending Physician Statement  I have discussed the care of Tameka Romero, including pertinent history and exam findings with the resident/fellow. I have reviewed the key elements of all parts of the encounter with the resident/fellow. I have seen and examined the patient with the resident/fellow.   I agree with the assessment and plan and status of the problem list as documented. Addiitionally I recommend cautious continuing Lasix drip. Maximize the drip as needed to keep urine output 100 to 150 mL an hour with maximum drip rate 40 mg an hour. Chest x-ray continues to show pleural effusions and pulmonary edema. She does have a sensation of orthopnea and shortness of breath at times. She does have edema of the extremities. Creatinine is worsening as documented above. The patient previously was on hemodialysis and this was discontinued many months ago. However, she is aware that dialysis may be necessary if we cannot get her out of congestive heart failure with loop diuretics. Heather Pearson. Rashmi Iyer MD  Nephrology Attending Physician  [facility]  1/30/2023.

## 2023-01-30 NOTE — PROGRESS NOTES
[facility]  Office: [address], DO, Alvaro Ligas, DO, Efraín Ingram, DO, Shiva Gill, DO, Lina Cali MD, Bernadette Oliver MD, Karen Hunt MD, Wale Zimmerman MD,  Marni Olivas MD, Jarad Munoz MD, Neftaly Saab, DO, Franklyn Manzanares MD,  Denzel Kawasaki, MD, Zenaida Sanz MD, Walt Begum, DO, Michael Horta MD, Crystal Cheatham MD, Roman Sandifer, DO, Greer Champagne MD, Enma Ghosh MD, Lucy Herron MD, Jennifer Segovia MD, Jose Villarreal DO, Martha Rose MD, Young Rust MD, Tricia Carroll, Elissa Pop, CNP, Izaiah Reddy, CNP, Anne Church, CNP,  Angela Cai, [facility], Nina Figueroa, CNP, Kinza Araujo, CNP, Quinn Zavala, CNP, Li Lomas, CNP, Otoniel Guzman, CNP, Mallory Mix PA-C, Eldon Daniels, CNS, Sophie Sharif, CNP, Werner Corado, CNP         Enrike Cox [address]    Progress Note    1/30/2023    7:31 AM    Name:   Karina Lind  MRN:     [id number]     Acct:      [de-identified]   Room:   [facility]0434-63   Day:  5  Admit Date:  1/25/2023 10:13 AM    PCP:   AFSANEH Albarado CNP  Code Status:  Full Code    Subjective:     C/C:   Chief Complaint   Patient presents with    Shortness of Breath    Weight Gain     Interval History Status: improved. Vitals reviewed, afebrile and hemodynamically stable. Saturating at 100% on 5 L nasal cannula. Patient refuses to use BiPAP nightly. Labs reviewed, creatinine continues to climb. Overnight patient had no significant events. On examination patient resting comfortably in bed. No complaints at this time stating her breathing actually feels better today. Started on Lasix gtt. per nephrology yesterday with decent urine output 2.5 L over the last 24 hours. Patient is now -1.2 L since admission. Patient requesting SNF placement referral sent to [address] [place] [address].     Brief History:     Jessenia Staples Jose is a 62 y.o. Non- / non  female who presents with Shortness of Breath and Weight Gain   and is admitted to the hospital for the management of Acute respiratory distress.     62 year-old female with known medical history of diastolic heart failure, CKD, hypertension, hyperlipidemia, type 2 diabetes mellitus, coronary artery disease with history of CABG presents to the hospital with worsening shortness of breath and leg swelling for last 1 month.  Patient states that she was on dialysis after she got contrast for a CT PE scan and was recently taken off dialysis in August 2022 by Dr. Darnell.  Her kidney numbers have remained stable after getting off dialysis.  Patient reports that she started noticing increasing swelling of her lower extremities and increasing shortness of breath on minimal exertion.  Patient followed up with her PCP who advised patient to get chest x-ray done which showed congestion.  Patient was then asked by PCP to come to the hospital.     In the ER patient was noted to be hypertensive with blood pressure 186/88, she was hypoxic saturating 78% on room air.  She was initially started on nonrebreather and then switched to BiPAP.  On my examination patient was on BiPAP.  She was also started on nitro drip for hypertension and pulmonary edema.  Her creatinine is around 2.12.  Troponin of 82 and 83.    Review of Systems:     Constitutional:  negative for chills, fevers, sweats  Respiratory: Positive for cough, dyspnea on exertion, shortness of breath. Negative for wheezing, throat swelling.  Cardiovascular: Positive for lower extremity edema. Negative for chest pain, palpitations  Gastrointestinal:  negative for abdominal pain, constipation, diarrhea, nausea, vomiting  Neurological:  negative for dizziness, headache    Medications:     Allergies:    Allergies   Allergen Reactions    Adhesive Tape Other (See Comments) and Rash     Other reaction(s): Unknown    Isosorbide Dinitrate  Angioedema    Ranexa [Ranolazine] Angioedema    Metformin And Related Diarrhea    Ace Inhibitors Other (See Comments)     Cough, states not good for her kidneys    Iv Dye [Iodides]      Stage 4 kidney disease    Nsaids      CANNOT TAKE D/T KIDNEY DISEASE    Metformin And Related Hives, Itching and Rash     Stage 4 kidney disease.        Current Meds:   Scheduled Meds:    insulin glargine  50 Units SubCUTAneous BID    tamsulosin  0.4 mg Oral Daily    insulin lispro  0-16 Units SubCUTAneous TID WC    insulin lispro  0-4 Units SubCUTAneous Nightly    ipratropium-albuterol  1 ampule Inhalation Q4H WA    gabapentin  300 mg Oral Nightly    docusate sodium  100 mg Oral BID    [Held by provider] ranolazine  1,000 mg Oral Daily    [Held by provider] isosorbide dinitrate  10 mg Oral TID    aspirin  81 mg Oral Daily    atorvastatin  40 mg Oral Nightly    sodium bicarbonate  1,300 mg Oral BID    pantoprazole  40 mg Oral QAM AC    sodium chloride flush  5-40 mL IntraVENous 2 times per day    heparin (porcine)  5,000 Units SubCUTAneous 3 times per day    carvedilol  25 mg Oral BID    amLODIPine  10 mg Oral Daily     Continuous Infusions:    furosemide (LASIX) 1mg/mL infusion 10 mg/hr (01/29/23 5793)    dextrose      sodium chloride       PRN Meds: benzocaine-menthol, melatonin, melatonin, glucose, dextrose bolus **OR** dextrose bolus, glucagon (rDNA), dextrose, sodium chloride flush, sodium chloride, potassium chloride **OR** potassium alternative oral replacement **OR** potassium chloride, magnesium sulfate, ondansetron **OR** ondansetron, polyethylene glycol, acetaminophen **OR** acetaminophen, labetalol, hydrALAZINE    Data:     Past Medical History:   has a past medical history of Arthritis, Backache, unspecified, Cerebral artery occlusion with cerebral infarction ([facility] CHF (congestive heart failure) ([facility] Chronic kidney disease, Coronary atherosclerosis of artery bypass graft, COVID, Cramp of limb, Gallstones, Hemodialysis patient ([facility] [address] Hyperlipidemia, Hypertension, Insomnia, Neuromuscular disorder ([facility] [address] Pneumonia, Psychiatric problem, Thyroid disease, Type II or unspecified type diabetes mellitus with renal manifestations, not stated as uncontrolled(250.40), Type II or unspecified type diabetes mellitus without mention of complication, not stated as uncontrolled, and Unspecified vitamin D deficiency. Social History:   reports that she has never smoked. She has never used smokeless tobacco. She reports that she does not drink alcohol and does not use drugs. Family History:   Family History   Problem Relation Age of Onset    Diabetes Father     Heart Failure Father        Vitals:  BP (!) 132/59   Pulse 53   Temp 98.2 °F (36.8 °C) (Oral)   Resp 16   Ht 5' 5\" (1.651 m)   Wt 255 lb 11.7 oz (116 kg)   SpO2 100%   BMI 42.56 kg/m²   Temp (24hrs), Av.6 °F (36.4 °C), Min:97.3 °F (36.3 °C), Max:98.2 °F (36.8 °C)    Recent Labs     23  0740 23  1109 23  1547 23  2054   POCGLU 96 199* 161* 158*         I/O (24Hr):     Intake/Output Summary (Last 24 hours) at 2023 0731  Last data filed at 2023 0700  Gross per 24 hour   Intake 1040 ml   Output 2527 ml   Net -1487 ml         Labs:  Hematology:  Recent Labs     23  1035   WBC 5.8   RBC 3.76*   HGB 11.7*   HCT 37.7   .3   MCH 31.1   MCHC 31.0   RDW 15.9*      MPV 10.8       Chemistry:  Recent Labs     23  0527 23  0608 23  0207    141 141   K 5.3 4.4 4.3    107 106   CO2 21 23 25   GLUCOSE 411* 128* 156*   BUN 42* 49* 56*   CREATININE 2.63* 2.89* 2.91*   MG 2.1 2.1 2.1   ANIONGAP 12 11 10   LABGLOM 20* 18* 17*   CALCIUM 8.6 8.4* 8.3*   PROBNP  --  2,093*  --        Recent Labs     23  1924 23  0733 23  0740 23  1109 23  1547 23   POCGLU 205* 106* 96 199* 161* 158*       ABG:  Lab Results   Component Value Date/Time    PHART 7.429 2022 01:42 PM DMU0QLG 45.9 04/24/2022 01:42 PM    PO2ART 47.6 04/24/2022 01:42 PM    DET0SIV 30.4 04/24/2022 01:42 PM    PBEA 6.1 04/24/2022 01:42 PM    S4UUFIHL 82.8 04/24/2022 01:42 PM    FIO2 INFORMATION NOT PROVIDED 01/25/2023 10:59 AM     Lab Results   Component Value Date/Time    SPECIAL RIGHT HAND 2ML 08/03/2022 02:22 AM     Lab Results   Component Value Date/Time    CULTURE NO SIGNIFICANT GROWTH 01/10/2023 02:14 PM       Radiology:  XR CHEST (2 VW)    Result Date: 1/24/2023  Findings suggest congestive heart failure The findings were sent to the Radiology Results [address] at 5:31 pm on 1/24/2023 to be communicated to a licensed caregiver. XR CHEST PORTABLE    Result Date: 1/25/2023  Appearance favoring worsening [facility] [place] [facility] vascular congestion/interstitial edema. Physical Examination:        General appearance:  alert, cooperative and no distress  Mental Status:  oriented to person, place and time and normal affect  HEENT: No significant tongue or throat throat edema. Lungs: Diminished in the bases L > R with some rales in the left base. Normal effort. Heart:  regular rate and rhythm, no murmur  Abdomen:  soft, nontender, nondistended, bowel sounds are present.   Extremities: 1-2+ pitting bilateral lower extremity edema, no redness, no tenderness in the calves  Skin:  no gross lesions, rashes, induration    Assessment:        Hospital Problems             Last Modified POA    * (Principal) Acute respiratory distress 1/25/2023 Yes    Type 2 diabetes mellitus with hyperglycemia, with long-term current use of insulin ([address] 1/25/2023 Yes    Hypertensive urgency 1/25/2023 Yes    Hypoxia 1/25/2023 Yes    Congestive heart failure ([address] 1/26/2023 Yes    Nephrotic range proteinuria 1/26/2023 Yes    Atherosclerosis of coronary artery bypass graft of native heart without angina pectoris 1/25/2023 Yes    Acute on chronic diastolic heart failure ([address] 1/25/2023 Yes    Diabetic polyneuropathy associated with type 2 diabetes mellitus ([facility] 1/25/2023 Yes    History of coronary artery bypass graft 1/25/2023 Yes    Mixed hyperlipidemia 1/25/2023 Yes    Stage 4 chronic kidney disease ([facility] 1/25/2023 Yes    Morbid obesity with BMI of 40.0-44.9, adult ([facility] 1/25/2023 Yes   Plan:        Laryngeal edema. Resolved. Angioedema versus fluid overload as patient states she was lying flat the night prior to swelling. However did respond to Decadron and Benadryl. Will avoid isosorbide dinitrate and Ranexa at this time. Acute respiratory failure with hypoxia. Secondary to #3. Currently saturating well on 5 L nasal cannula but saturating 100% will wean. Patient states she uses 3 L intermittently at home as needed. Acute on chronic diastolic congestive heart failure. Echocardiogram reviewed from 8/2/2022 with EF 55% and no obvious wall motion abnormalities. Weaned off nitro infusion. Bumex 2 mg IV twice transition to Lasix gtt. by nephrology, Coreg 25 mg twice daily reduced to 12.5 mg twice daily due to bradycardia. Hold isosorbide dinitrate and Ranexa due to suspected hypersensitivity/angioedema. Elevated troponin. Troponin mildly elevated above baseline with complaints of chest pressure. Cardiology consulted. Echocardiogram with left ventricular ejection fraction 50 to 55% and indeterminate diastolic dysfunction. Elevated troponin likely secondary to AURELIA and CKD. Hypertension. Continue amlodipine 10 mg daily, Coreg 25 mg twice daily reduced to 12.5 mg twice daily. Stage IV CKD. Follows outpatient with Dr. Del Davis. Nephrology following. Currently on Lasix gtt. and Pantoja catheter in place. 2.5 L out in the past 24 hours. Type 2 diabetes mellitus. Hemoglobin A1c 7.1 1/19/2023. Continue Lantus 50 units twice daily and high-dose corrective algorithm. POCT glucose and hypoglycemia protocol. Hyperlipidemia. Continue Lipitor 40 mg nightly. CAD status post CABG.  continue aspirin 81 mg daily, Lipitor 40 mg nightly, Coreg 25 mg twice daily reduced to 12.5 mg twice daily due to bradycardia. Morbid obesity. Encourage lifestyle modification with diet and exercise. DVT prophylaxis: Heparin 5000 units subcutaneously every 8 hours. GI prophylaxis: Protonix 40 mg daily. Discharge planning: Continue to monitor urine output, creatinine as well as respiratory status with diuresis. Plan for discharge to SNF once medically stable/optimized.     Junior Leonard DO  1/30/2023  7:31 AM

## 2023-01-30 NOTE — CARE COORDINATION
0915 am  Pt requested SNF placement. Awaiting acceptance. On Lasix drip. Possibility of needing hemodialysis, again. Has Pantoja, monitoring hourly output. Tai Alvarez 17[address] at [facility] admissions inquiring if they can accept pt. Intake , [phone]. Maribel Quijano, RN care manager with [facility] here and discussed with him precert auth. His number is [phone].     136 [facility] [place], admissions to inquire if pt has been accepted. Not in network with [facility] [place]. 1205 Received call from Easton Clark at [facility], asked if they can accept. She will review with staff and call NCM back. 1340 Received call from Radha at [address] [place] [address], they can accept. If pt needs HD, will need plan. Notified that IV and Pantoja will be discontinued at discharge. Notified she is on O2 3L nc.     1508 Received a call from Laurita Diez at [facility], unable to accept, before she had Medicaid, she incurred a $25,000 balance. She said try plan B, will still review with administration if no auth by [facility].

## 2023-01-30 NOTE — PROGRESS NOTES
Physical Therapy  Facility/Department: [facility] 4B STEPDOWN  Daily Treatment Note    Name: Aminta Mortimer  : 1960  MRN: [id number]  Date of Service: 2023    Discharge Recommendations:  Patient would benefit from continued therapy after discharge Further therapy recommended at discharge. The patient should be able to tolerate at least 3 hours of therapy per day over 5 days or 15 hours over 7 days. This patient may benefit from a Physical Medicine and Rehab consult. PT Equipment Recommendations  Equipment Needed: No      Patient Diagnosis(es): The primary encounter diagnosis was Congestive heart failure, unspecified HF chronicity, unspecified heart failure type ([address] A diagnosis of Hypoxia was also pertinent to this visit. Past Medical History:  has a past medical history of Arthritis, Backache, unspecified, Cerebral artery occlusion with cerebral infarction ([address] CHF (congestive heart failure) ([address] Chronic kidney disease, Coronary atherosclerosis of artery bypass graft, COVID, Cramp of limb, Gallstones, Hemodialysis patient ([address] Hyperlipidemia, Hypertension, Insomnia, Neuromuscular disorder ([address] Pneumonia, Psychiatric problem, Thyroid disease, Type II or unspecified type diabetes mellitus with renal manifestations, not stated as uncontrolled(250.40), Type II or unspecified type diabetes mellitus without mention of complication, not stated as uncontrolled, and Unspecified vitamin D deficiency. Past Surgical History:  has a past surgical history that includes Coronary artery bypass graft; Knee arthroscopy; Carpal tunnel release; Breast surgery; Tonsillectomy; Hand surgery; Ankle fracture surgery; Cholecystectomy, open (N/A); IR TUNNELED CVC PLACE WO SQ PORT/PUMP > 5 YEARS (2021); AV fistula creation (2021); Dialysis fistula creation (Left, 2021); other surgical history (2022); back surgery; Colonoscopy; eye surgery; fracture surgery; and Cardiac surgery.     Assessment Body Structures, Functions, Activity Limitations Requiring Skilled Therapeutic Intervention: Decreased functional mobility ; Decreased strength;Decreased endurance;Decreased balance  Assessment: Pt ambulated 65 ft total (15 ft + 50 ft with seated rest period in between to recover from fatigue) with RW CGA on 3L NC. Pt would benefit from additional PT upon discharge. PT would be unsafe to return to prior living arrangements based on today's session, will continue to assess pending progress. Therapy Prognosis: Good  Activity Tolerance  Activity Tolerance: Patient limited by endurance; Patient limited by fatigue  Activity Tolerance Comments: pt having some lightheadedness when transitioning     Plan   Physcial Therapy Plan  General Plan: 5-7 times per week  Current Treatment Recommendations: Strengthening, Balance training, Transfer training, Functional mobility training, Home exercise program, Equipment evaluation, education, & procurement, Patient/Caregiver education & training, Safety education & training, Therapeutic activities, Endurance training, Gait training  Safety Devices  Type of Devices: Gait belt, Call light within reach, Nurse notified, All fall risk precautions in place, Left in chair  Restraints  Restraints Initially in Place: No     Restrictions  Restrictions/Precautions  Restrictions/Precautions: Fall Risk  Required Braces or Orthoses?: No  Position Activity Restriction  Other position/activity restrictions: Up with Assist.  Monitor SpO2. High Anxiety. Subjective   General  Chart Reviewed: Yes  Response To Previous Treatment: Patient with no complaints from previous session. Family / Caregiver Present: No  General Comment  Comments: Pt retired to recliner with call light  Subjective  Subjective: Pt supine in bed and agreeable to therapy, RN agreeable to therapy. Pt pleasant and cooperative throughout.   Pt denies pain at rest.        Cognition   Orientation  Overall Orientation Status: Within Normal Limits  Cognition  Overall Cognitive Status: WNL     Objective   Bed mobility  Supine to Sit: Stand by assistance (increased time and effort)  Sit to Supine: Unable to assess (pt retired to recliner)  Scooting: Stand by assistance  Bed Mobility Comments: HOB elevated ~40 degrees with use of bed rail; pt sat at EOB ~ 5 minutes d/t c/o lightheadedness to recover  Transfers  Sit to Stand: Contact guard assistance  Stand to Sit: Contact guard assistance  Comment: Assessed to RW with good understanding of UE placement. Brief static stand d/t slight lightheadedness. x2 trials  Ambulation  Surface: Level tile  Device: Rolling Walker  Other Apparatus: O2 (3L NC)  Assistance: Contact guard assistance  Quality of Gait: fair stability, decreased stride length, no LOB  Gait Deviations: Slow Annie;Decreased step length;Decreased step height  Distance: 15 ft, seated rest period, 50 ft  Comments: Pt didn't want to ambulate too far from room d/t rosa catheter     Balance  Posture: Good  Sitting - Static: Good  Sitting - Dynamic: Good;-  Standing - Static: Fair;+  Standing - Dynamic: Fair  Comments: standing balance assessed while using a RW  Exercise Treatment: Seated LE exercise program: [employer], heel/toe raises, and marches. Reps: x 20 reps        OutComes Score   AM-PAC Score  AM-PAC Inpatient Mobility Raw Score : 17 (01/30/23 1605)  AM-PAC Inpatient T-Scale Score : 42.13 (01/30/23 1605)  Mobility Inpatient CMS 0-100% Score: 50.57 (01/30/23 1605)  Mobility Inpatient CMS G-Code Modifier : CK (01/30/23 1605)    Goals  Short Term Goals  Time Frame for Short Term Goals: 14 visits  Short Term Goal 1: Pt will ambulate 150 feet with a RW and supervision to increase functional independence. Short Term Goal 2: Pt will demonstrate good- dynamic standing balance to decrease fall risk. Short Term Goal 3: Pt will perform sit<>stand transfer independently.   Short Term Goal 4: Pt will tolerate a 35 minute therapy session to promote increased endurance. Short Term Goal 5: Pt will perform supine<>sit transfer with supervision  Additional Goals?: No       Education  Patient Education  Education Given To: Patient  Education Provided: Role of Therapy;Transfer Training;Plan of Care; Fall Prevention Strategies  Education Method: Verbal  Barriers to Learning: None  Education Outcome: Verbalized understanding;Demonstrated understanding      Therapy Time   Individual Concurrent Group Co-treatment   Time In 8776         Time Out 1601         Minutes 45         Timed Code Treatment Minutes: [facility]

## 2023-01-30 NOTE — PLAN OF CARE
Problem: Discharge Planning  Goal: Discharge to home or other facility with appropriate resources  1/29/2023 2240 by Harrison Gunderson RN  Outcome: Progressing  1/29/2023 1835 by Tessie Ding RN  Outcome: Progressing     Problem: Pain  Goal: Verbalizes/displays adequate comfort level or baseline comfort level  1/29/2023 2240 by Harrison Gunderson RN  Outcome: Progressing  1/29/2023 1835 by Tessie Ding RN  Outcome: Progressing     Problem: Safety - Adult  Goal: Free from fall injury  Outcome: Progressing     Problem: Chronic Conditions and Co-morbidities  Goal: Patient's chronic conditions and co-morbidity symptoms are monitored and maintained or improved  1/29/2023 2240 by Harrison Gunderson RN  Outcome: Progressing  1/29/2023 1835 by Tessie Ding RN  Outcome: Not Progressing  Flowsheets (Taken 1/29/2023 1835)  Care Plan - Patient's Chronic Conditions and Co-Morbidity Symptoms are Monitored and Maintained or Improved: Monitor and assess patient's chronic conditions and comorbid symptoms for stability, deterioration, or improvement  Note: Started Lasix drip. Placed Pantoja catheter. Problem: ABCDS Injury Assessment  Goal: Absence of physical injury  1/29/2023 2240 by Harrison Gunderson RN  Outcome: Progressing  1/29/2023 1835 by Tessie Ding RN  Outcome: Progressing     Problem: Respiratory - Adult  Goal: Achieves optimal ventilation and oxygenation  1/29/2023 2240 by Harrison Gunderson RN  Outcome: Progressing  1/29/2023 1835 by Tessie Ding RN  Outcome: Progressing  1/29/2023 1006 by Anna Bauman RCP  Outcome: Progressing     Problem: Skin/Tissue Integrity  Goal: Absence of new skin breakdown  Description: 1. Monitor for areas of redness and/or skin breakdown  2. Assess vascular access sites hourly  3. Every 4-6 hours minimum:  Change oxygen saturation probe site  4.   Every 4-6 hours:  If on nasal continuous positive airway pressure, respiratory therapy assess nares and determine need for appliance change or resting period. Outcome: Progressing     Problem: Chronic Conditions and Co-morbidities  Goal: Patient's chronic conditions and co-morbidity symptoms are monitored and maintained or improved  1/29/2023 2240 by Stephane Tse RN  Outcome: Progressing  1/29/2023 1835 by Allen Mccain RN  Outcome: Not Progressing  Flowsheets (Taken 1/29/2023 1835)  Care Plan - Patient's Chronic Conditions and Co-Morbidity Symptoms are Monitored and Maintained or Improved: Monitor and assess patient's chronic conditions and comorbid symptoms for stability, deterioration, or improvement  Note: Started Lasix drip. Placed Pantoja catheter.

## 2023-01-31 LAB
ANION GAP SERPL CALCULATED.3IONS-SCNC: 12 MMOL/L (ref 9–17)
BUN BLDV-MCNC: 61 MG/DL (ref 8–23)
CALCIUM SERPL-MCNC: 8.5 MG/DL (ref 8.6–10.4)
CHLORIDE BLD-SCNC: 104 MMOL/L (ref 98–107)
CO2: 25 MMOL/L (ref 20–31)
CREAT SERPL-MCNC: 3.02 MG/DL (ref 0.5–0.9)
GFR SERPL CREATININE-BSD FRML MDRD: 17 ML/MIN/1.73M2
GLUCOSE BLD-MCNC: 206 MG/DL (ref 65–105)
GLUCOSE BLD-MCNC: 234 MG/DL (ref 65–105)
GLUCOSE BLD-MCNC: 256 MG/DL (ref 65–105)
GLUCOSE BLD-MCNC: 55 MG/DL (ref 70–99)
GLUCOSE BLD-MCNC: 59 MG/DL (ref 65–105)
GLUCOSE BLD-MCNC: 72 MG/DL (ref 65–105)
MAGNESIUM: 2.1 MG/DL (ref 1.6–2.6)
POTASSIUM SERPL-SCNC: 3.9 MMOL/L (ref 3.7–5.3)
SODIUM BLD-SCNC: 141 MMOL/L (ref 135–144)
TROPONIN I SERPL DL<=0.01 NG/ML-MCNC: 68 NG/L (ref 0–14)

## 2023-01-31 PROCEDURE — 99232 SBSQ HOSP IP/OBS MODERATE 35: CPT | Performed by: INTERNAL MEDICINE

## 2023-01-31 PROCEDURE — 6370000000 HC RX 637 (ALT 250 FOR IP): Performed by: STUDENT IN AN ORGANIZED HEALTH CARE EDUCATION/TRAINING PROGRAM

## 2023-01-31 PROCEDURE — 2700000000 HC OXYGEN THERAPY PER DAY

## 2023-01-31 PROCEDURE — 94761 N-INVAS EAR/PLS OXIMETRY MLT: CPT

## 2023-01-31 PROCEDURE — 36415 COLL VENOUS BLD VENIPUNCTURE: CPT

## 2023-01-31 PROCEDURE — 97530 THERAPEUTIC ACTIVITIES: CPT

## 2023-01-31 PROCEDURE — 2060000000 HC ICU INTERMEDIATE R&B

## 2023-01-31 PROCEDURE — 6360000002 HC RX W HCPCS: Performed by: STUDENT IN AN ORGANIZED HEALTH CARE EDUCATION/TRAINING PROGRAM

## 2023-01-31 PROCEDURE — 94640 AIRWAY INHALATION TREATMENT: CPT

## 2023-01-31 PROCEDURE — 84484 ASSAY OF TROPONIN QUANT: CPT

## 2023-01-31 PROCEDURE — 6370000000 HC RX 637 (ALT 250 FOR IP): Performed by: NURSE PRACTITIONER

## 2023-01-31 PROCEDURE — 80048 BASIC METABOLIC PNL TOTAL CA: CPT

## 2023-01-31 PROCEDURE — 83735 ASSAY OF MAGNESIUM: CPT

## 2023-01-31 PROCEDURE — 97535 SELF CARE MNGMENT TRAINING: CPT

## 2023-01-31 PROCEDURE — 82947 ASSAY GLUCOSE BLOOD QUANT: CPT

## 2023-01-31 PROCEDURE — 2580000003 HC RX 258: Performed by: INTERNAL MEDICINE

## 2023-01-31 PROCEDURE — 6360000002 HC RX W HCPCS: Performed by: INTERNAL MEDICINE

## 2023-01-31 PROCEDURE — 2580000003 HC RX 258: Performed by: STUDENT IN AN ORGANIZED HEALTH CARE EDUCATION/TRAINING PROGRAM

## 2023-01-31 PROCEDURE — 6370000000 HC RX 637 (ALT 250 FOR IP): Performed by: CLINICAL NURSE SPECIALIST

## 2023-01-31 PROCEDURE — 99232 SBSQ HOSP IP/OBS MODERATE 35: CPT | Performed by: STUDENT IN AN ORGANIZED HEALTH CARE EDUCATION/TRAINING PROGRAM

## 2023-01-31 RX ORDER — INSULIN GLARGINE 100 [IU]/ML
20 INJECTION, SOLUTION SUBCUTANEOUS ONCE
Status: COMPLETED | OUTPATIENT
Start: 2023-01-31 | End: 2023-01-31

## 2023-01-31 RX ORDER — INSULIN GLARGINE 100 [IU]/ML
40 INJECTION, SOLUTION SUBCUTANEOUS 2 TIMES DAILY
Status: DISCONTINUED | OUTPATIENT
Start: 2023-01-31 | End: 2023-02-01

## 2023-01-31 RX ADMIN — FUROSEMIDE 10 MG/HR: 10 INJECTION, SOLUTION INTRAMUSCULAR; INTRAVENOUS at 13:27

## 2023-01-31 RX ADMIN — IPRATROPIUM BROMIDE AND ALBUTEROL SULFATE 1 AMPULE: 2.5; .5 SOLUTION RESPIRATORY (INHALATION) at 20:59

## 2023-01-31 RX ADMIN — ASPIRIN 81 MG: 81 TABLET, CHEWABLE ORAL at 08:55

## 2023-01-31 RX ADMIN — INSULIN LISPRO 8 UNITS: 100 INJECTION, SOLUTION INTRAVENOUS; SUBCUTANEOUS at 13:28

## 2023-01-31 RX ADMIN — INSULIN GLARGINE 20 UNITS: 100 INJECTION, SOLUTION SUBCUTANEOUS at 13:28

## 2023-01-31 RX ADMIN — SODIUM BICARBONATE 1300 MG: 648 TABLET ORAL at 08:54

## 2023-01-31 RX ADMIN — ATORVASTATIN CALCIUM 40 MG: 80 TABLET, FILM COATED ORAL at 19:48

## 2023-01-31 RX ADMIN — SODIUM CHLORIDE, PRESERVATIVE FREE 10 ML: 5 INJECTION INTRAVENOUS at 08:54

## 2023-01-31 RX ADMIN — PANTOPRAZOLE SODIUM 40 MG: 40 TABLET, DELAYED RELEASE ORAL at 05:11

## 2023-01-31 RX ADMIN — INSULIN LISPRO 4 UNITS: 100 INJECTION, SOLUTION INTRAVENOUS; SUBCUTANEOUS at 17:05

## 2023-01-31 RX ADMIN — SODIUM BICARBONATE 1300 MG: 648 TABLET ORAL at 19:48

## 2023-01-31 RX ADMIN — HEPARIN SODIUM 5000 UNITS: 5000 INJECTION INTRAVENOUS; SUBCUTANEOUS at 08:54

## 2023-01-31 RX ADMIN — IPRATROPIUM BROMIDE AND ALBUTEROL SULFATE 1 AMPULE: 2.5; .5 SOLUTION RESPIRATORY (INHALATION) at 11:57

## 2023-01-31 RX ADMIN — GABAPENTIN 300 MG: 300 CAPSULE ORAL at 19:57

## 2023-01-31 RX ADMIN — INSULIN GLARGINE 40 UNITS: 100 INJECTION, SOLUTION SUBCUTANEOUS at 19:48

## 2023-01-31 RX ADMIN — DOCUSATE SODIUM 100 MG: 100 CAPSULE ORAL at 19:48

## 2023-01-31 RX ADMIN — AMLODIPINE BESYLATE 10 MG: 10 TABLET ORAL at 08:54

## 2023-01-31 RX ADMIN — CARVEDILOL 12.5 MG: 12.5 TABLET, FILM COATED ORAL at 08:55

## 2023-01-31 RX ADMIN — HEPARIN SODIUM 5000 UNITS: 5000 INJECTION INTRAVENOUS; SUBCUTANEOUS at 23:28

## 2023-01-31 RX ADMIN — IPRATROPIUM BROMIDE AND ALBUTEROL SULFATE 1 AMPULE: 2.5; .5 SOLUTION RESPIRATORY (INHALATION) at 16:38

## 2023-01-31 RX ADMIN — TAMSULOSIN HYDROCHLORIDE 0.4 MG: 0.4 CAPSULE ORAL at 08:54

## 2023-01-31 RX ADMIN — IPRATROPIUM BROMIDE AND ALBUTEROL SULFATE 1 AMPULE: 2.5; .5 SOLUTION RESPIRATORY (INHALATION) at 08:49

## 2023-01-31 RX ADMIN — Medication 10 MG: at 22:44

## 2023-01-31 RX ADMIN — HEPARIN SODIUM 5000 UNITS: 5000 INJECTION INTRAVENOUS; SUBCUTANEOUS at 17:05

## 2023-01-31 RX ADMIN — SODIUM CHLORIDE, PRESERVATIVE FREE 10 ML: 5 INJECTION INTRAVENOUS at 19:51

## 2023-01-31 RX ADMIN — CARVEDILOL 12.5 MG: 12.5 TABLET, FILM COATED ORAL at 19:48

## 2023-01-31 RX ADMIN — DOCUSATE SODIUM 100 MG: 100 CAPSULE ORAL at 08:54

## 2023-01-31 ASSESSMENT — PAIN - FUNCTIONAL ASSESSMENT: PAIN_FUNCTIONAL_ASSESSMENT: PREVENTS OR INTERFERES SOME ACTIVE ACTIVITIES AND ADLS

## 2023-01-31 ASSESSMENT — PAIN DESCRIPTION - ORIENTATION: ORIENTATION: MID

## 2023-01-31 ASSESSMENT — PAIN DESCRIPTION - FREQUENCY: FREQUENCY: CONTINUOUS

## 2023-01-31 ASSESSMENT — PAIN DESCRIPTION - DESCRIPTORS: DESCRIPTORS: ACHING

## 2023-01-31 ASSESSMENT — PAIN SCALES - GENERAL
PAINLEVEL_OUTOF10: 1
PAINLEVEL_OUTOF10: 4
PAINLEVEL_OUTOF10: 0

## 2023-01-31 ASSESSMENT — PAIN DESCRIPTION - LOCATION
LOCATION: CHEST
LOCATION: CHEST

## 2023-01-31 ASSESSMENT — PAIN DESCRIPTION - ONSET: ONSET: SUDDEN

## 2023-01-31 ASSESSMENT — PAIN DESCRIPTION - PAIN TYPE: TYPE: ACUTE PAIN

## 2023-01-31 NOTE — PLAN OF CARE
Problem: Discharge Planning  Goal: Discharge to home or other facility with appropriate resources  1/30/2023 2302 by Amadeo Parker RN  Outcome: Progressing     Problem: Safety - Adult  Goal: Free from fall injury  1/30/2023 2302 by Amadeo Parker RN  Outcome: Progressing     Problem: Respiratory - Adult  Goal: Achieves optimal ventilation and oxygenation  1/30/2023 2302 by Amadeo Parker RN  Outcome: Progressing

## 2023-01-31 NOTE — PROGRESS NOTES
Occupational Therapy  Facility/Department: [address] STEPDOWN  Occupational Therapy Daily Treatment Note    Name: Jonathan Gonzalez  : 1960  MRN: [id number]  Date of Service: 2023    Discharge Recommendations:  Patient would benefit from continued therapy after discharge, 24 hour supervision or assist  OT Equipment Recommendations  Other: AE // DME recommendations for DC are ongoing. Patient Diagnosis(es): The primary encounter diagnosis was Congestive heart failure, unspecified HF chronicity, unspecified heart failure type ([address] A diagnosis of Hypoxia was also pertinent to this visit. Past Medical History:  has a past medical history of Arthritis, Backache, unspecified, Cerebral artery occlusion with cerebral infarction ([address] CHF (congestive heart failure) ([address] Chronic kidney disease, Coronary atherosclerosis of artery bypass graft, COVID, Cramp of limb, Gallstones, Hemodialysis patient ([address] Hyperlipidemia, Hypertension, Insomnia, Neuromuscular disorder ([address] Pneumonia, Psychiatric problem, Thyroid disease, Type II or unspecified type diabetes mellitus with renal manifestations, not stated as uncontrolled(250.40), Type II or unspecified type diabetes mellitus without mention of complication, not stated as uncontrolled, and Unspecified vitamin D deficiency. Past Surgical History:  has a past surgical history that includes Coronary artery bypass graft; Knee arthroscopy; Carpal tunnel release; Breast surgery; Tonsillectomy; Hand surgery; Ankle fracture surgery; Cholecystectomy, open (N/A); IR TUNNELED CVC PLACE WO SQ PORT/PUMP > 5 YEARS (2021); AV fistula creation (2021); Dialysis fistula creation (Left, 2021); other surgical history (2022); back surgery; Colonoscopy; eye surgery; fracture surgery; and Cardiac surgery. Assessment   Performance deficits / Impairments: Decreased functional mobility ; Decreased endurance;Decreased coordination;Decreased ADL status; Decreased balance;Decreased strength;Decreased safe awareness;Decreased high-level IADLs;Decreased cognition  Prognosis: Good  REQUIRES OT FOLLOW-UP: Yes  Activity Tolerance  Activity Tolerance: Patient Tolerated treatment well        Plan   Occupational Therapy Plan  Times Per Week: 3-5x/week  Current Treatment Recommendations: Strengthening, Balance training, Functional mobility training, Endurance training, Equipment evaluation, education, & procurement, Patient/Caregiver education & training, Safety education & training, Self-Care / ADL, Home management training, Cognitive/Perceptual training, Coordination training     Restrictions  Restrictions/Precautions  Restrictions/Precautions: Fall Risk  Required Braces or Orthoses?: No  Position Activity Restriction  Other position/activity restrictions: Up with Assist.  Monitor SpO2. Appears stressful and anxious at times through therapy session. Subjective   General  Patient assessed for rehabilitation services?: Yes  Response to previous treatment: Patient with no complaints from previous session  Family / Caregiver Present: No  Diagnosis: Respiratory Distress  Subjective  Subjective: Pt reported having pain in her bottom, but did not rate this day. Pt reported feeling occasional SOB. Pt was able to progress with therapy session this date. General Comment  Comments: RN okayed for OT session this PM. Pt was agreeable and cooperative. Upon GARCIA arrival pt was supine in bed w/ HOB elevated. Pt completed bed mobility, ADLs, functional mobility and retired to chair at end of session. Pt had call light within reach, all needs met and Rn notified. Increased time needed for line management and assistance from clinical aid during func mob. Objective   O2 Device: Nasal cannula, 3L    Safety Devices  Type of Devices: Gait belt;Call light within reach;Nurse notified; All fall risk precautions in place; Left in chair  Restraints  Restraints Initially in Place: No  Bed Mobility Training  Bed Mobility Training: Yes  Overall Level of Assistance: Stand-by assistance; Additional time; Adaptive equipment (HOB elevated (~45 degrees); Bedrails; Increased time // effort)  Interventions: Safety awareness training  Supine to Sit: Stand-by assistance  Sit to Supine: Stand-by assistance  Scooting: Stand-by assistance  Balance  Sitting: Without support (pt sat EOB ~10 mins, prior to functional mobility. SUP)  Standing: With support (w/ RW, 2Xs; total ~2 mins prior/post func mob., CGA)  Transfer Training  Transfer Training: Yes  Overall Level of Assistance: Minimum assistance; Additional time; Adaptive equipment (w/ RW)  Interventions: Safety awareness training  Sit to Stand: Minimum assistance; Adaptive equipment; Additional time  Stand to Sit: Minimum assistance; Additional time; Adaptive equipment  Bed to Chair: Minimum assistance; Additional time; Adaptive equipment  Gait  Overall Level of Assistance: Additional time; Adaptive equipment;Contact-guard assistance (Pt completed functional mobility of house hold distances from EOB across room 2Xs and one hallway distance, returned to chair after completing func mob. Pt required 3L O2. Pt continued to be on IV while completing func mob)  Interventions: Safety awareness training  Assistive Device: Walker, rolling;Gait belt    ADL  Grooming: Verbal cueing; Increased time to complete;Supervision  Grooming Skilled Clinical Factors: Pt completed oral hygiene, washed face while seated EOB. Pt completed tasks and continued to with func mob. UE Dressing: Minimal assistance; Increased time to complete;Verbal cueing  UE Dressing Skilled Clinical Factors: Sitting EOB to don gown like a robe. VCs for directions of where to place hands. Cognition  Overall Cognitive Status: WNL  Orientation  Overall Orientation Status: Within Normal Limits  Orientation Level: Oriented X4    Education Given To: Patient  Education Provided: Role of Therapy;Plan of Care;Precautions; Equipment; ADL Adaptive Strategies;Transfer Training; Fall Prevention Strategies; Energy Conservation  Education Provided Comments: Role of therapy, POC, precautions with letting body regulate prior to standing and completing func mob, placement of hands on edge of bed prior to standing, EC when completing func mob and taking rest breaks when needed, - good return  Education Method: Demonstration;Verbal  Education Outcome: Verbalized understanding;Continued education needed    AM-PAC Score  AM-PAC Inpatient Daily Activity Raw Score: 18 (01/31/23 1622)  AM-PAC Inpatient ADL T-Scale Score : 38.66 (01/31/23 1622)  ADL Inpatient CMS 0-100% Score: 46.65 (01/31/23 1622)  ADL Inpatient CMS G-Code Modifier : CK (01/31/23 1622)    Goals  Short Term Goals  Time Frame for Short Term Goals: By Discharge, Pt will -  Short Term Goal 1: Demo Mod I and Good Integration of EC // Ax pacing during ADLs. Short Term Goal 2: Demo Sup Assist and Fair+ Integration of AE // DME during LB ADLs and Toileting. Short Term Goal 3: Complete Functional ADL and Bathroom Transfers with SBA and without LOB. Short Term Goal 4: Demo Functional Mobility (in-room // in-home negotiation) with SBA while maintaining SpO2 >90%.        Therapy Time   Individual Concurrent Group Co-treatment   Time In 0518         Time Out 1511         Minutes 55         Timed Code Treatment Minutes: [address], GARCIA/L

## 2023-01-31 NOTE — CARE COORDINATION
Transitional planning note: Plan is [address]. Spoke with Radhames Aguilar in admissions and she started precert yesterday and it is pending. Patient is agreeable to plan. Left message for sherry with Reena at [facility] to notify that patient is going to [facility].

## 2023-01-31 NOTE — PROGRESS NOTES
Nephrology Progress Note      SUBJECTIVE    Patient seen and examined at bedside. Patient has been more mobile over the last 2 days. No acute events overnight. Patient is afebrile and hemodynamically stable. Vital signs are stable. Patient's creatinine continues to worsen. Now up to 3  Explained to the patient that dialysis may be needed in the near future. Lasix infusion on hold because of brisk diuresis. Since morning only 450 cc. We will resume back to Lasix infusion    OBJECTIVE      CURRENT TEMPERATURE:  Temp: 98.4 °F (36.9 °C)  MAXIMUM TEMPERATURE OVER 24HRS:  Temp (24hrs), Av.3 °F (36.8 °C), Min:98.1 °F (36.7 °C), Max:98.5 °F (36.9 °C)    CURRENT RESPIRATORY RATE:  Resp: 14  CURRENT PULSE:  Heart Rate: 60  CURRENT BLOOD PRESSURE:  BP: 114/67  24HR BLOOD PRESSURE RANGE:  Systolic (16EBM), AUBREY:626 , Min:114 , Y   ; Diastolic (26ULK), MDY:86, Min:55, Max:70    24HR INTAKE/OUTPUT:    Intake/Output Summary (Last 24 hours) at 2023 1248  Last data filed at 2023 1125  Gross per 24 hour   Intake 300 ml   Output 3580 ml   Net -3280 ml     WEIGHT :Patient Vitals for the past 96 hrs (Last 3 readings):   Weight   23 0527 255 lb 1.2 oz (115.7 kg)   23 0525 255 lb 11.7 oz (116 kg)   23 0447 255 lb 11.7 oz (116 kg)     PHYSICAL EXAM      Constitutional: No fever, no chills, no lethargy, + weakness. HEENT:           No headache, otalgia, itchy eyes, nasal discharge or sore throat. Cardiac:           No chest pain, + exertional dyspnea, no orthopnea or PND. Chest:              No cough, phlegm or wheezing. Abdomen:        No abdominal pain, nausea or vomiting. Neuro:               no focal weakness, abnormal movements orseizure like activity. Skin:                No rashes, no itching. :                  No hematuria, no pyuria, no dysuria, no flank pain. Extremities:     + swelling or joint pains.     CURRENT MEDICATIONS      insulin glargine (LANTUS) injection vial 40 Units, BID  carvedilol (COREG) tablet 12.5 mg, BID  tamsulosin (FLOMAX) capsule 0.4 mg, Daily  furosemide (LASIX) 100 mg in sodium chloride 0.9 % 100 mL infusion, Continuous  insulin lispro (HUMALOG) injection vial 0-16 Units, TID WC  insulin lispro (HUMALOG) injection vial 0-4 Units, Nightly  ipratropium-albuterol (DUONEB) nebulizer solution 1 ampule, Q4H WA  benzocaine-menthol (CEPACOL SORE THROAT) lozenge 1 lozenge, Q2H PRN  gabapentin (NEURONTIN) capsule 300 mg, Nightly  melatonin tablet 3 mg, Nightly PRN  melatonin tablet 10 mg, Nightly PRN  glucose chewable tablet 16 g, PRN  dextrose bolus 10% 125 mL, PRN   Or  dextrose bolus 10% 250 mL, PRN  glucagon (rDNA) injection 1 mg, PRN  dextrose 10 % infusion, Continuous PRN  docusate sodium (COLACE) capsule 100 mg, BID  [Held by provider] ranolazine (RANEXA) extended release tablet 1,000 mg, Daily  [Held by provider] isosorbide dinitrate (ISORDIL) tablet 10 mg, TID  aspirin chewable tablet 81 mg, Daily  atorvastatin (LIPITOR) tablet 40 mg, Nightly  sodium bicarbonate tablet 1,300 mg, BID  pantoprazole (PROTONIX) tablet 40 mg, QAM AC  sodium chloride flush 0.9 % injection 5-40 mL, 2 times per day  sodium chloride flush 0.9 % injection 10 mL, PRN  0.9 % sodium chloride infusion, PRN  potassium chloride (KLOR-CON M) extended release tablet 40 mEq, PRN   Or  potassium bicarb-citric acid (EFFER-K) effervescent tablet 40 mEq, PRN   Or  potassium chloride 10 mEq/100 mL IVPB (Peripheral Line), PRN  magnesium sulfate 1000 mg in dextrose 5% 100 mL IVPB, PRN  ondansetron (ZOFRAN-ODT) disintegrating tablet 4 mg, Q8H PRN   Or  ondansetron (ZOFRAN) injection 4 mg, Q6H PRN  polyethylene glycol (GLYCOLAX) packet 17 g, Daily PRN  acetaminophen (TYLENOL) tablet 650 mg, Q6H PRN   Or  acetaminophen (TYLENOL) suppository 650 mg, Q6H PRN  heparin (porcine) injection 5,000 Units, 3 times per day  labetalol (NORMODYNE;TRANDATE) injection 10 mg, Q6H PRN  amLODIPine (NORVASC) tablet 10 mg, Daily  hydrALAZINE (APRESOLINE) injection 10 mg, Q6H PRN        LABS      CBC:   No results for input(s): WBC, RBC, HGB, HCT, MCV, MCH, MCHC, RDW, PLT, MPV in the last 72 hours. BMP:   Recent Labs     01/29/23  0608 01/30/23  0207 01/31/23 0421    141 141   K 4.4 4.3 3.9    106 104   CO2 23 25 25   BUN 49* 56* 61*   CREATININE 2.89* 2.91* 3.02*   GLUCOSE 128* 156* 55*   CALCIUM 8.4* 8.3* 8.5*      PHOSPHORUS:    No results for input(s): PHOS in the last 72 hours.     MAGNESIUM:   Recent Labs     01/29/23  0608 01/30/23  0207 01/31/23 0421   MG 2.1 2.1 2.1         RUSSELL:   Lab Results   Component Value Date    RUSSELL NEGATIVE 08/03/2022       SPEP:   Lab Results   Component Value Date/Time    PROT 7.0 12/01/2022 10:25 AM     UPEP:   Lab Results   Component Value Date/Time    TPU 20 11/12/2021 10:30 AM      HEPBSAG:  Lab Results   Component Value Date/Time    HEPBSAG NONREACTIVE 03/30/2022 03:30 PM     HEPCAB:  Lab Results   Component Value Date/Time    HEPCAB NONREACTIVE 03/30/2022 03:30 PM       URINE SODIUM:    Lab Results   Component Value Date/Time    JASON 47 11/14/2021 02:04 AM      URINE CREATININE:    Lab Results   Component Value Date/Time    LABCREA 65.4 01/26/2023 01:10 PM         URINALYSIS:  U/A:   Lab Results   Component Value Date/Time    NITRU NEGATIVE 01/10/2023 02:14 PM    COLORU Yellow 01/10/2023 02:14 PM    PHUR 7.0 01/10/2023 02:14 PM    WBCUA 2 TO 5 01/10/2023 02:14 PM    RBCUA 0 TO 2 01/10/2023 02:14 PM    MUCUS 2+ 07/31/2022 12:00 PM    TRICHOMONAS NOT REPORTED 11/14/2021 02:05 AM    YEAST FEW 07/02/2022 09:10 AM    BACTERIA FEW 01/10/2023 02:14 PM    SPECGRAV 1.021 01/10/2023 02:14 PM    LEUKOCYTESUR SMALL 01/10/2023 02:14 PM    UROBILINOGEN Normal 01/10/2023 02:14 PM    BILIRUBINUR NEGATIVE 01/10/2023 02:14 PM    GLUCOSEU NEGATIVE 01/10/2023 02:14 PM    KETUA NEGATIVE 01/10/2023 02:14 PM    AMORPHOUS 2+ 07/31/2022 12:00 PM     Repeat random urine protein creatinine ratio is approximately at 3.1 g.  As an outpatient it was quantified somewhere around 5 g. RADIOLOGY      XR CHEST (2 VW)    Result Date: 1/24/2023  EXAMINATION: TWO XRAY VIEWS OF THE CHEST 1/24/2023 4:25 pm COMPARISON: 06/18/2022 HISTORY: ORDERING SYSTEM PROVIDED HISTORY: Hypoxemia TECHNOLOGIST PROVIDED HISTORY: worsening of shortness of breath and hypoxemia Reason for Exam: pt state SOB x 1 yr , chest pressure FINDINGS: Status post median sternotomy. Cardiomegaly. Pulmonary vascular congestion. Pulmonary edema. No focal airspace disease. No pneumothorax. Findings suggest congestive heart failure The findings were sent to the Radiology Results [id number] at 5:31 pm on 1/24/2023 to be communicated to a licensed caregiver. XR CHEST PORTABLE    Result Date: 1/27/2023  EXAMINATION: ONE XRAY VIEW OF THE CHEST 1/27/2023 9:45 am COMPARISON: 01/25/2023 HISTORY: ORDERING SYSTEM PROVIDED HISTORY: dyspnea TECHNOLOGIST PROVIDED HISTORY: Dyspnea FINDINGS: Median sternotomy wires are noted. Cardiomegaly, vascular congestion, and small pleural effusions are not significantly changed. No evidence of pneumothorax. No significant interval change. Vascular congestion and small pleural effusions. XR CHEST PORTABLE    Result Date: 1/25/2023  EXAMINATION: ONE XRAY VIEW OF THE CHEST 1/25/2023 11:20 am COMPARISON: 24 January 2023 HISTORY: ORDERING SYSTEM PROVIDED HISTORY: pulm edema TECHNOLOGIST PROVIDED HISTORY: pulm edema Reason for Exam: uprt port chest FINDINGS: AP portable view of the chest time stamped at 1102 hours demonstrates overlying cardiac monitoring electrodes and prior median sternotomy. Cardiomegaly and vascular congestion are noted with bilateral effusions with mild interval worsening since prior study. No extrapleural air. Osseous structures are age-appropriate. Appearance favoring worsening Ana Lilia vascular congestion/interstitial edema. ASSESSMENT    1.   Stage IV CKD likely from underlying diabetic renal involvement. Was on dialysis for several months, got off dialysis approximately 5-1/2 months ago. Creatinine continues to worsen now 2.91.  2.  Admitted with weight gain, worsening edema. I feel this is a combination of CHF and worsening proteinuria. She has had anywhere between 3 to 5 g of proteinuria likely secondary to either diabetic renal disease versus FSGS. 3.  Diabetes mellitus type 2  4. Chronic hypertension  5. Diabetic polyneuropathy  6. History of mild diastolic heart failure, preserved LV function based on last cardiac echo    PLAN      1. Lasix infusion   She is aware of the fact that the hemodialysis may become necessary if inadequate diuresis continues to be an issue. 2.  No change in chest x-ray from previous. 2.  Creatinine worsened from yesterday now 2.91.  3.  Do not recommend restarting ARB at this time. 4.  Strict I's and O's and daily weights recorded in the chart  5. Following. Please do not hesitate to call with questions.     This note is created with the assistance of a speech-recognition program. While intending to generate a document that actually reflects the content of the visit, no guarantees can be provided that every mistake has been identified and corrected by editing        Mai Khanna MD MD, [facility] Siva Ordonez), [address]   1/31/2023 12:49 PM

## 2023-01-31 NOTE — PROGRESS NOTES
[facility]  Office: [address], DO, Renetta [place], DO, Sun Guardadoer, DO, Jordan Fragoso Blood, DO, Herb Angelucci, MD, Sejal Agrawal MD, Topher Jarquin MD, Tricia Zarate MD,  July Beard MD, Chato Dietz MD, Samuel Hernadez, DO, Maria Victoria Barger MD,  Mc Iverson MD, Eliceo Rooney MD, Janeth Hammond, DO, Lucas Watkins MD, Keegan Desai MD, Lyndsay Curiel, DO, Hector Tamayo MD, Srinivasan Chester MD, Miri Capps MD, Kyra Alanis MD, Renaldo Bosch, DO, Darby Parra MD, Ashlie Anderson MD, Sang Howe, Maki Thornton CNP, Braydon Mandel, CNP, Hans Calix, CNP,  Cher Crooks, [facility], Jennifer Watts, CNP, Alex Butcher, CNP, Fernando Landers, CNP, Yuliana Rios, CNP, Cristopher Ace, CNP, VICTORIANO BairesC, Obdulio Mcghee, CNS, Shira Santana, CNP, Serge Jasso, CNP         Enrike Rodgers [address]    Progress Note    1/31/2023    7:44 AM    Name:   Bijal Rodriguez  MRN:     [id number]     Acct:      [de-identified]   Room:   [address] [place] [address] Day:  6  Admit Date:  1/25/2023 10:13 AM    PCP:   AFSANEH Samaniego CNP  Code Status:  Full Code    Subjective:     C/C:   Chief Complaint   Patient presents with    Shortness of Breath    Weight Gain     Interval History Status: improved. Patient is alert oriented x3. Vital signs stable. Saturating well on 3 L oxygen. Labs reviewed, creatinine continues to worsen. Has refused dialysis in the past.  And does not like this to be even mentioned to her. Her blood sugars were low this morning. Adjusted Lantus. No other current complaints. On Lasix drip. Her output improved with Lasix drip. Brief History:     Bijal Rodriguez is a 57 y.o. Non- / non  female who presents with Shortness of Breath and Weight Gain   and is admitted to the hospital for the management of Acute respiratory distress.      57year-old female with known medical history of diastolic heart failure, CKD, hypertension, hyperlipidemia, type 2 diabetes mellitus, coronary artery disease with history of CABG presents to the hospital with worsening shortness of breath and leg swelling for last 1 month. Patient states that she was on dialysis after she got contrast for a CT PE scan and was recently taken off dialysis in August 2022 by Dr. Kathleen Pineda. Her kidney numbers have remained stable after getting off dialysis. Patient reports that she started noticing increasing swelling of her lower extremities and increasing shortness of breath on minimal exertion. Patient followed up with her PCP who advised patient to get chest x-ray done which showed congestion. Patient was then asked by PCP to come to the hospital.     In the ER patient was noted to be hypertensive with blood pressure 186/88, she was hypoxic saturating 78% on room air. She was initially started on nonrebreather and then switched to BiPAP. On my examination patient was on BiPAP. She was also started on nitro drip for hypertension and pulmonary edema. Her creatinine is around 2.12. Troponin of 82 and 83. Review of Systems:     Constitutional:  negative for chills, fevers, sweats  Respiratory: Positive for shortness of breath. Negative for wheezing, throat swelling. Cardiovascular: Positive for lower extremity edema. Negative for chest pain, palpitations  Gastrointestinal:  negative for abdominal pain, constipation, diarrhea, nausea, vomiting  Neurological:  negative for dizziness, headache    Medications: Allergies:     Allergies   Allergen Reactions    Adhesive Tape Other (See Comments) and Rash     Other reaction(s): Unknown    Isosorbide Dinitrate Angioedema    Ranexa [Ranolazine] Angioedema    Metformin And Related Diarrhea    Ace Inhibitors Other (See Comments)     Cough, states not good for her kidneys    Iv Dye [Iodides]      Stage 4 kidney disease    Nsaids      CANNOT TAKE D/T KIDNEY DISEASE Metformin And Related Hives, Itching and Rash     Stage 4 kidney disease.        Current Meds:   Scheduled Meds:    carvedilol  12.5 mg Oral BID    insulin glargine  50 Units SubCUTAneous BID    tamsulosin  0.4 mg Oral Daily    insulin lispro  0-16 Units SubCUTAneous TID WC    insulin lispro  0-4 Units SubCUTAneous Nightly    ipratropium-albuterol  1 ampule Inhalation Q4H WA    gabapentin  300 mg Oral Nightly    docusate sodium  100 mg Oral BID    [Held by provider] ranolazine  1,000 mg Oral Daily    [Held by provider] isosorbide dinitrate  10 mg Oral TID    aspirin  81 mg Oral Daily    atorvastatin  40 mg Oral Nightly    sodium bicarbonate  1,300 mg Oral BID    pantoprazole  40 mg Oral QAM AC    sodium chloride flush  5-40 mL IntraVENous 2 times per day    heparin (porcine)  5,000 Units SubCUTAneous 3 times per day    amLODIPine  10 mg Oral Daily     Continuous Infusions:    furosemide (LASIX) 1mg/mL infusion Stopped (01/30/23 1112)    dextrose      sodium chloride       PRN Meds: benzocaine-menthol, melatonin, melatonin, glucose, dextrose bolus **OR** dextrose bolus, glucagon (rDNA), dextrose, sodium chloride flush, sodium chloride, potassium chloride **OR** potassium alternative oral replacement **OR** potassium chloride, magnesium sulfate, ondansetron **OR** ondansetron, polyethylene glycol, acetaminophen **OR** acetaminophen, labetalol, hydrALAZINE    Data:     Past Medical History:   has a past medical history of Arthritis, Backache, unspecified, Cerebral artery occlusion with cerebral infarction ([facility] CHF (congestive heart failure) ([facility] [address] [facility] Chronic kidney disease, Coronary atherosclerosis of artery bypass graft, COVID, Cramp of limb, Gallstones, Hemodialysis patient ([facility] [address] Hyperlipidemia, Hypertension, Insomnia, Neuromuscular disorder ([facility] [address] [facility] Pneumonia, Psychiatric problem, Thyroid disease, Type II or unspecified type diabetes mellitus with renal manifestations, not stated as uncontrolled(250.40), Type II or unspecified type diabetes mellitus without mention of complication, not stated as uncontrolled, and Unspecified vitamin D deficiency. Social History:   reports that she has never smoked. She has never used smokeless tobacco. She reports that she does not drink alcohol and does not use drugs. Family History:   Family History   Problem Relation Age of Onset    Diabetes Father     Heart Failure Father        Vitals:  /70   Pulse 63   Temp 98.2 °F (36.8 °C) (Temporal)   Resp 17   Ht 5' 5\" (1.651 m)   Wt 255 lb 1.2 oz (115.7 kg)   SpO2 95%   BMI 42.45 kg/m²   Temp (24hrs), Av.2 °F (36.8 °C), Min:97.9 °F (36.6 °C), Max:98.5 °F (36.9 °C)    Recent Labs     23  1608 23  0537 23  0629   POCGLU 155* 172* 59* 72         I/O (24Hr): Intake/Output Summary (Last 24 hours) at 2023 0744  Last data filed at 2023 0527  Gross per 24 hour   Intake 540 ml   Output 3580 ml   Net -3040 ml         Labs:  Hematology:  No results for input(s): WBC, RBC, HGB, HCT, MCV, MCH, MCHC, RDW, PLT, MPV, SEDRATE, CRP, INR, DDIMER, KF1TUNKL, LABABSO in the last 72 hours.     Invalid input(s): PT    Chemistry:  Recent Labs     23  0608 23  0207 23  0421    141 141   K 4.4 4.3 3.9    106 104   CO2 23 25 25   GLUCOSE 128* 156* 55*   BUN 49* 56* 61*   CREATININE 2.89* 2.91* 3.02*   MG 2.1 2.1 2.1   ANIONGAP 11 10 12   LABGLOM 18* 17* 17*   CALCIUM 8.4* 8.3* 8.5*   PROBNP 2,093*  --   --        Recent Labs     23  0810 23  1229 23  1608 23  0537 23  0629   POCGLU 101 229* 155* 172* 59* 72       ABG:  Lab Results   Component Value Date/Time    PHART 7.429 2022 01:42 PM    JFF7MDZ 45.9 2022 01:42 PM    PO2ART 47.6 2022 01:42 PM    THK9YHW 30.4 2022 01:42 PM    PBEA 6.1 2022 01:42 PM    P3VWOGDP 82.8 2022 01:42 PM    FIO2 INFORMATION NOT PROVIDED 2023 10:59 AM     Lab Results Component Value Date/Time    SPECIAL RIGHT HAND 2ML 08/03/2022 02:22 AM     Lab Results   Component Value Date/Time    CULTURE NO SIGNIFICANT GROWTH 01/10/2023 02:14 PM       Radiology:  XR CHEST (2 VW)    Result Date: 1/24/2023  Findings suggest congestive heart failure The findings were sent to the Radiology Results [address] at 5:31 pm on 1/24/2023 to be communicated to a licensed caregiver. XR CHEST PORTABLE    Result Date: 1/25/2023  Appearance favoring worsening [facility] vascular congestion/interstitial edema. Physical Examination:        General appearance:  alert, cooperative and no distress. Saturating well on 3 L oxygen via nasal cannula  Mental Status:  oriented to person, place and time and normal affect  HEENT: No significant tongue or throat throat edema. Lungs:  Normal effort. Normal bilateral air entry. Heart:  regular rate and rhythm, no murmur  Abdomen:  soft, nontender, nondistended, bowel sounds are present.   Extremities: 1-2+ pitting bilateral lower extremity edema, no redness, no tenderness in the calves  Skin:  no gross lesions, rashes, induration    Assessment:        Hospital Problems             Last Modified POA    * (Principal) Acute respiratory distress 1/25/2023 Yes    Type 2 diabetes mellitus with hyperglycemia, with long-term current use of insulin ([address] 1/25/2023 Yes    Hypertensive urgency 1/25/2023 Yes    Hypoxia 1/25/2023 Yes    Congestive heart failure ([address] 1/26/2023 Yes    Nephrotic range proteinuria 1/26/2023 Yes    Atherosclerosis of coronary artery bypass graft of native heart without angina pectoris 1/25/2023 Yes    Acute on chronic diastolic heart failure ([address] 1/25/2023 Yes    Diabetic polyneuropathy associated with type 2 diabetes mellitus ([address] 1/25/2023 Yes    History of coronary artery bypass graft 1/25/2023 Yes    Mixed hyperlipidemia 1/25/2023 Yes    Stage 4 chronic kidney disease ([address] 1/25/2023 Yes    Morbid obesity with BMI of 40.0-44.9, adult [facility]) 1/25/2023 Yes   Plan:        Laryngeal edema. Resolved. Will avoid isosorbide dinitrate and Ranexa at this time due to suspected anaphylaxis. Acute respiratory failure with hypoxia. Secondary to #3. Currently saturating well on 3 L nasal cannula. Acute on chronic diastolic congestive heart failure- on Lasix gtt. currently. Appreciate nephrology recommendations. Elevated troponin likely from worsening renal function. Appreciate cardiology recommendations. Hypertension. Continue amlodipine 10 mg daily, Coreg 12.5 mg twice daily     Stage IV CKD. Creatinine continues to worsen. Continued on Lasix drip. Appreciate nephrology recommendations. Type 2 diabetes mellitus. Blood sugar levels were low this morning. Changed to Lantus 40 twice daily. high-dose corrective algorithm. POCT glucose and hypoglycemia protocol. Hyperlipidemia. Continue Lipitor 40 mg nightly. CAD status post CABG. last cath 2019 showed all patent grafts. Continue aspirin 81 mg daily, Lipitor 40 mg nightly, Coreg 12.5 mg twice daily. Morbid obesity. Encourage lifestyle modification with diet and exercise. DVT prophylaxis: Heparin 5000 units subcutaneously every 8 hours. GI prophylaxis: Protonix 40 mg daily. Discharge planning: Plan is discharged to [facility] once medically stable. Pre-CERT started yesterday which is pending.     Dung Adames MD  1/31/2023  7:44 AM

## 2023-01-31 NOTE — PLAN OF CARE
Problem: Discharge Planning  Goal: Discharge to home or other facility with appropriate resources  Outcome: Progressing  Flowsheets (Taken 1/31/2023 0800)  Discharge to home or other facility with appropriate resources: Identify barriers to discharge with patient and caregiver     Problem: Pain  Goal: Verbalizes/displays adequate comfort level or baseline comfort level  Outcome: Progressing     Problem: Safety - Adult  Goal: Free from fall injury  Outcome: Progressing     Problem: Chronic Conditions and Co-morbidities  Goal: Patient's chronic conditions and co-morbidity symptoms are monitored and maintained or improved  Outcome: Progressing  Flowsheets (Taken 1/31/2023 0800)  Care Plan - Patient's Chronic Conditions and Co-Morbidity Symptoms are Monitored and Maintained or Improved: Monitor and assess patient's chronic conditions and comorbid symptoms for stability, deterioration, or improvement     Problem: ABCDS Injury Assessment  Goal: Absence of physical injury  Outcome: Progressing     Problem: Respiratory - Adult  Goal: Achieves optimal ventilation and oxygenation  Outcome: Progressing  Flowsheets (Taken 1/31/2023 0800)  Achieves optimal ventilation and oxygenation: Assess for changes in respiratory status     Problem: Skin/Tissue Integrity  Goal: Absence of new skin breakdown  Description: 1. Monitor for areas of redness and/or skin breakdown  2. Assess vascular access sites hourly  3. Every 4-6 hours minimum:  Change oxygen saturation probe site  4. Every 4-6 hours:  If on nasal continuous positive airway pressure, respiratory therapy assess nares and determine need for appliance change or resting period.   Outcome: Progressing

## 2023-01-31 NOTE — PROGRESS NOTES
2145: Writer notified nephrology provider on call of patient's urine output greater than 150mL/hr while on lasix drip as target hourly output is 100-150 ml and gtt is at lowest rate ordered. Writer instructed to decrease gtt to 5mg/hr. 2350: Lasix gtt stopped per Dr. Alfred Gonzáles as patient had greater than 500mL urine output over the past 2 hours.

## 2023-02-01 PROBLEM — J96.01 ACUTE RESPIRATORY FAILURE WITH HYPOXIA (HCC): Status: ACTIVE | Noted: 2023-02-01

## 2023-02-01 LAB
ABSOLUTE EOS #: 0.26 K/UL (ref 0–0.44)
ABSOLUTE IMMATURE GRANULOCYTE: <0.03 K/UL (ref 0–0.3)
ABSOLUTE LYMPH #: 1.11 K/UL (ref 1.1–3.7)
ABSOLUTE MONO #: 0.54 K/UL (ref 0.1–1.2)
ANION GAP SERPL CALCULATED.3IONS-SCNC: 14 MMOL/L (ref 9–17)
BASOPHILS # BLD: 1 % (ref 0–2)
BASOPHILS ABSOLUTE: 0.04 K/UL (ref 0–0.2)
BUN SERPL-MCNC: 65 MG/DL (ref 8–23)
CALCIUM SERPL-MCNC: 8.8 MG/DL (ref 8.6–10.4)
CHLORIDE SERPL-SCNC: 103 MMOL/L (ref 98–107)
CO2 SERPL-SCNC: 25 MMOL/L (ref 20–31)
CREAT SERPL-MCNC: 2.99 MG/DL (ref 0.5–0.9)
EOSINOPHILS RELATIVE PERCENT: 4 % (ref 1–4)
GFR SERPL CREATININE-BSD FRML MDRD: 17 ML/MIN/1.73M2
GLUCOSE BLD-MCNC: 297 MG/DL (ref 65–105)
GLUCOSE BLD-MCNC: 298 MG/DL (ref 65–105)
GLUCOSE BLD-MCNC: 370 MG/DL (ref 65–105)
GLUCOSE BLD-MCNC: 53 MG/DL (ref 65–105)
GLUCOSE BLD-MCNC: 88 MG/DL (ref 65–105)
GLUCOSE SERPL-MCNC: 73 MG/DL (ref 70–99)
HCT VFR BLD AUTO: 37.3 % (ref 36.3–47.1)
HGB BLD-MCNC: 11.7 G/DL (ref 11.9–15.1)
IMMATURE GRANULOCYTES: 0 %
LYMPHOCYTES # BLD: 19 % (ref 24–43)
MCH RBC QN AUTO: 31.5 PG (ref 25.2–33.5)
MCHC RBC AUTO-ENTMCNC: 31.4 G/DL (ref 28.4–34.8)
MCV RBC AUTO: 100.3 FL (ref 82.6–102.9)
MONOCYTES # BLD: 9 % (ref 3–12)
NRBC AUTOMATED: 0 PER 100 WBC
PDW BLD-RTO: 14.9 % (ref 11.8–14.4)
PLATELET # BLD AUTO: 141 K/UL (ref 138–453)
PMV BLD AUTO: 12.2 FL (ref 8.1–13.5)
POTASSIUM SERPL-SCNC: 4.2 MMOL/L (ref 3.7–5.3)
RBC # BLD: 3.72 M/UL (ref 3.95–5.11)
RBC # BLD: ABNORMAL 10*6/UL
SEG NEUTROPHILS: 67 % (ref 36–65)
SEGMENTED NEUTROPHILS ABSOLUTE COUNT: 3.99 K/UL (ref 1.5–8.1)
SODIUM SERPL-SCNC: 142 MMOL/L (ref 135–144)
WBC # BLD AUTO: 6 K/UL (ref 3.5–11.3)

## 2023-02-01 PROCEDURE — 6370000000 HC RX 637 (ALT 250 FOR IP): Performed by: STUDENT IN AN ORGANIZED HEALTH CARE EDUCATION/TRAINING PROGRAM

## 2023-02-01 PROCEDURE — 2500000003 HC RX 250 WO HCPCS: Performed by: INTERNAL MEDICINE

## 2023-02-01 PROCEDURE — 6360000002 HC RX W HCPCS: Performed by: INTERNAL MEDICINE

## 2023-02-01 PROCEDURE — 36415 COLL VENOUS BLD VENIPUNCTURE: CPT

## 2023-02-01 PROCEDURE — 2580000003 HC RX 258: Performed by: INTERNAL MEDICINE

## 2023-02-01 PROCEDURE — 85027 COMPLETE CBC AUTOMATED: CPT

## 2023-02-01 PROCEDURE — 94640 AIRWAY INHALATION TREATMENT: CPT

## 2023-02-01 PROCEDURE — 6360000002 HC RX W HCPCS: Performed by: STUDENT IN AN ORGANIZED HEALTH CARE EDUCATION/TRAINING PROGRAM

## 2023-02-01 PROCEDURE — 80048 BASIC METABOLIC PNL TOTAL CA: CPT

## 2023-02-01 PROCEDURE — 99232 SBSQ HOSP IP/OBS MODERATE 35: CPT | Performed by: INTERNAL MEDICINE

## 2023-02-01 PROCEDURE — 2580000003 HC RX 258: Performed by: STUDENT IN AN ORGANIZED HEALTH CARE EDUCATION/TRAINING PROGRAM

## 2023-02-01 PROCEDURE — 6370000000 HC RX 637 (ALT 250 FOR IP): Performed by: CLINICAL NURSE SPECIALIST

## 2023-02-01 PROCEDURE — 82947 ASSAY GLUCOSE BLOOD QUANT: CPT

## 2023-02-01 PROCEDURE — 2060000000 HC ICU INTERMEDIATE R&B

## 2023-02-01 PROCEDURE — 2700000000 HC OXYGEN THERAPY PER DAY

## 2023-02-01 PROCEDURE — 6370000000 HC RX 637 (ALT 250 FOR IP): Performed by: NURSE PRACTITIONER

## 2023-02-01 PROCEDURE — 94761 N-INVAS EAR/PLS OXIMETRY MLT: CPT

## 2023-02-01 PROCEDURE — 99232 SBSQ HOSP IP/OBS MODERATE 35: CPT | Performed by: STUDENT IN AN ORGANIZED HEALTH CARE EDUCATION/TRAINING PROGRAM

## 2023-02-01 RX ORDER — INSULIN GLARGINE 100 [IU]/ML
20 INJECTION, SOLUTION SUBCUTANEOUS 2 TIMES DAILY
Status: DISCONTINUED | OUTPATIENT
Start: 2023-02-01 | End: 2023-02-03

## 2023-02-01 RX ORDER — BUMETANIDE 0.25 MG/ML
2 INJECTION, SOLUTION INTRAMUSCULAR; INTRAVENOUS 2 TIMES DAILY
Status: DISCONTINUED | OUTPATIENT
Start: 2023-02-01 | End: 2023-02-04

## 2023-02-01 RX ORDER — INSULIN GLARGINE 100 [IU]/ML
10 INJECTION, SOLUTION SUBCUTANEOUS ONCE
Status: COMPLETED | OUTPATIENT
Start: 2023-02-01 | End: 2023-02-01

## 2023-02-01 RX ORDER — INSULIN GLARGINE 100 [IU]/ML
20 INJECTION, SOLUTION SUBCUTANEOUS NIGHTLY
Status: DISCONTINUED | OUTPATIENT
Start: 2023-02-01 | End: 2023-02-01

## 2023-02-01 RX ADMIN — INSULIN GLARGINE 10 UNITS: 100 INJECTION, SOLUTION SUBCUTANEOUS at 12:51

## 2023-02-01 RX ADMIN — AMLODIPINE BESYLATE 10 MG: 10 TABLET ORAL at 09:34

## 2023-02-01 RX ADMIN — HEPARIN SODIUM 5000 UNITS: 5000 INJECTION INTRAVENOUS; SUBCUTANEOUS at 23:39

## 2023-02-01 RX ADMIN — IPRATROPIUM BROMIDE AND ALBUTEROL SULFATE 1 AMPULE: 2.5; .5 SOLUTION RESPIRATORY (INHALATION) at 15:35

## 2023-02-01 RX ADMIN — IPRATROPIUM BROMIDE AND ALBUTEROL SULFATE 1 AMPULE: 2.5; .5 SOLUTION RESPIRATORY (INHALATION) at 21:40

## 2023-02-01 RX ADMIN — FUROSEMIDE 2 MG/HR: 10 INJECTION, SOLUTION INTRAMUSCULAR; INTRAVENOUS at 01:07

## 2023-02-01 RX ADMIN — SODIUM CHLORIDE, PRESERVATIVE FREE 10 ML: 5 INJECTION INTRAVENOUS at 20:02

## 2023-02-01 RX ADMIN — DOCUSATE SODIUM 100 MG: 100 CAPSULE ORAL at 19:59

## 2023-02-01 RX ADMIN — INSULIN LISPRO 8 UNITS: 100 INJECTION, SOLUTION INTRAVENOUS; SUBCUTANEOUS at 12:52

## 2023-02-01 RX ADMIN — BUMETANIDE 2 MG: 0.25 INJECTION INTRAMUSCULAR; INTRAVENOUS at 20:01

## 2023-02-01 RX ADMIN — IPRATROPIUM BROMIDE AND ALBUTEROL SULFATE 1 AMPULE: 2.5; .5 SOLUTION RESPIRATORY (INHALATION) at 11:24

## 2023-02-01 RX ADMIN — HEPARIN SODIUM 5000 UNITS: 5000 INJECTION INTRAVENOUS; SUBCUTANEOUS at 09:34

## 2023-02-01 RX ADMIN — INSULIN GLARGINE 10 UNITS: 100 INJECTION, SOLUTION SUBCUTANEOUS at 17:11

## 2023-02-01 RX ADMIN — POLYETHYLENE GLYCOL 3350 17 G: 17 POWDER, FOR SOLUTION ORAL at 20:14

## 2023-02-01 RX ADMIN — CARVEDILOL 12.5 MG: 12.5 TABLET, FILM COATED ORAL at 19:59

## 2023-02-01 RX ADMIN — GABAPENTIN 300 MG: 300 CAPSULE ORAL at 19:59

## 2023-02-01 RX ADMIN — ATORVASTATIN CALCIUM 40 MG: 80 TABLET, FILM COATED ORAL at 19:58

## 2023-02-01 RX ADMIN — PANTOPRAZOLE SODIUM 40 MG: 40 TABLET, DELAYED RELEASE ORAL at 05:36

## 2023-02-01 RX ADMIN — ASPIRIN 81 MG: 81 TABLET, CHEWABLE ORAL at 09:35

## 2023-02-01 RX ADMIN — INSULIN GLARGINE 20 UNITS: 100 INJECTION, SOLUTION SUBCUTANEOUS at 20:00

## 2023-02-01 RX ADMIN — DOCUSATE SODIUM 100 MG: 100 CAPSULE ORAL at 09:34

## 2023-02-01 RX ADMIN — SODIUM BICARBONATE 1300 MG: 648 TABLET ORAL at 19:59

## 2023-02-01 RX ADMIN — SODIUM BICARBONATE 1300 MG: 648 TABLET ORAL at 09:35

## 2023-02-01 RX ADMIN — Medication 10 MG: at 22:07

## 2023-02-01 RX ADMIN — INSULIN LISPRO 16 UNITS: 100 INJECTION, SOLUTION INTRAVENOUS; SUBCUTANEOUS at 17:10

## 2023-02-01 RX ADMIN — IPRATROPIUM BROMIDE AND ALBUTEROL SULFATE 1 AMPULE: 2.5; .5 SOLUTION RESPIRATORY (INHALATION) at 08:03

## 2023-02-01 RX ADMIN — TAMSULOSIN HYDROCHLORIDE 0.4 MG: 0.4 CAPSULE ORAL at 09:34

## 2023-02-01 RX ADMIN — SODIUM CHLORIDE, PRESERVATIVE FREE 10 ML: 5 INJECTION INTRAVENOUS at 09:34

## 2023-02-01 RX ADMIN — HEPARIN SODIUM 5000 UNITS: 5000 INJECTION INTRAVENOUS; SUBCUTANEOUS at 17:11

## 2023-02-01 ASSESSMENT — PAIN SCALES - GENERAL: PAINLEVEL_OUTOF10: 0

## 2023-02-01 NOTE — PROGRESS NOTES
2100: Patient complaining of left-sided chest pain and shortness of breath similar to that which she had upon admission. RN notified [facility] NP on call. New orders placed. EKG obtained. 2015: Writer notified nephrology provider on call of patient's increased urine output with lasix drip at lowest ordered rate; patient has had about 250 mL urine output per hour since 7pm. Provider advised writer to [employer] it down to 5 mg/ hr and decrease it further by 1 mg/ hr to keep urine output 100-150 ml/hr\".

## 2023-02-01 NOTE — PROGRESS NOTES
Physical Therapy Cancel Note      DATE: 2023    NAME: Keira Storey  MRN: [id number]   : 1960      Patient not seen this date for Physical Therapy due to:     Other: OT working with pt; just getting her up to chair; check 23      Electronically signed by Juan Luis Yeung PT on 2023 at 3:11 PM

## 2023-02-01 NOTE — PLAN OF CARE
Problem: Discharge Planning  Goal: Discharge to home or other facility with appropriate resources  1/31/2023 2332 by Margi Brown RN  Outcome: Progressing     Problem: Pain  Goal: Verbalizes/displays adequate comfort level or baseline comfort level  1/31/2023 2332 by Margi Brown RN  Outcome: Progressing     Problem: Safety - Adult  Goal: Free from fall injury  1/31/2023 2332 by Margi Brown RN  Outcome: Progressing

## 2023-02-01 NOTE — PROGRESS NOTES
Comprehensive Nutrition Assessment    Type and Reason for Visit:  Initial    Nutrition Recommendations/Plan:   Continue diet as ordered. Continue to monitor weights, intakes, labs and follow up. Malnutrition Assessment:  Malnutrition Status:  No malnutrition (02/01/23 1400)    Context:  Acute Illness     Findings of the 6 clinical characteristics of malnutrition:  Energy Intake:  No significant decrease in energy intake  Weight Loss:  No significant weight loss     Body Fat Loss:  No significant body fat loss     Muscle Mass Loss:  No significant muscle mass loss    Fluid Accumulation:  No significant fluid accumulation     Strength:  Not Performed    Nutrition Assessment:    Chart reviewed for length of stay. Patient admitted related to respiratory failure , CHF, nephrotic range proteinuria, weight gain and worsening edema. Patient intakes %. Per EMR weight history, patient has had a significant weight gain of 11.6% x 6 months. Note 1/31 increased urine output with lasix drip; increased mobility over the past two days. Patient has a brief history of HD tx, discontinued five months ago. Transitional planning is in place. Nutrition Related Findings:    Labs/Meds reviewed Wound Type: None       Current Nutrition Intake & Therapies:    Average Meal Intake: %  Average Supplements Intake: None Ordered  ADULT DIET; Regular; Low Sodium (2 gm); Low Potassium (Less than 3000 mg/day); Low Phosphorus (Less than 1000 mg); 1500 ml    Anthropometric Measures:  Height: 5' 5\" (165.1 cm)  Ideal Body Weight (IBW): 125 lbs (57 kg)    Admission Body Weight: 252 lb 7 oz (114.5 kg)  Current Body Weight: 253 lb 8.5 oz (115 kg), 202.8 % IBW.  Weight Source: Bed Scale  Current BMI (kg/m2): 42.2  Weight Adjustment For: No Adjustment  BMI Categories: Obese Class 3 (BMI 40.0 or greater)    Estimated Daily Nutrient Needs:  Energy Requirements Based On: Formula  Weight Used for Energy Requirements: Current  Energy (kcal/day): 1700kcal/day  Weight Used for Protein Requirements: Ideal  Protein (g/day): 70gmsPRO/day  Fluid (ml/day): Per MD    Nutrition Diagnosis:   No nutrition diagnosis at this time related to   as evidenced by      Nutrition Interventions:   Food and/or Nutrient Delivery: Continue Current Diet  Nutrition Education/Counseling: No recommendation at this time  Coordination of Nutrition Care: Continue to monitor while inpatient       Goals:     Goals: Meet at least 75% of estimated needs, within 7 days, prior to discharge       Nutrition Monitoring and Evaluation:   Behavioral-Environmental Outcomes: None Identified  Food/Nutrient Intake Outcomes: Food and Nutrient Intake  Physical Signs/Symptoms Outcomes: Biochemical Data, Weight, Fluid Status or Edema    Discharge Planning:    Continue current diet     Suzanna Barrientos, [address]  Contact: [phone]

## 2023-02-01 NOTE — PROGRESS NOTES
[facility]  Office: [address], DO, Dinah Webster, DO, Herve Santos, DO, Jayla Gill, DO, Rosario Zhou MD, Ludy Manzanares MD, Mason Malone MD, Rod Meadows MD,  Leonard Coe MD, Chilo Morgan MD, Nette Srivastava DO, Kianna Boucher MD,  Chayo Trejo MD, Jose Ayala MD, Dieter Jade DO, Glenys Qureshi MD, Anabelle Chance MD, Denis Smith DO, Farrukh Summers MD, Cony Michel MD, Liliam Ortega MD, Michael Costello MD, Romeo Alamo DO, Jacobo Torrez MD, Marisabel Monae MD, Caesar West, Oli Gutierrez, CNP, Shana Muse, CNP, Lorena Mcneil, CNP,  Vera Gallegos [facility], Ruddy Dominguez, CNP, Jeri Colin, CNP, Abelardo Mas, CNP, Babs Lopez, CNP, Faisal Pressley, CNP, VICTORIANO GarciaC, Anoop Otto, ZEV, Mic Andino, CNP, Reina Brady, CNP         Enrike Rodgers 19    Progress Note    2/1/2023    8:03 AM    Name:   Juan Ramon William  MRN:     [id number]     Acct:      [de-identified]   Room:   [address] [place] [address] Day:  7  Admit Date:  1/25/2023 10:13 AM    PCP:   AFSANEH Caruso CNP  Code Status:  Full Code    Subjective:     C/C:   Chief Complaint   Patient presents with    Shortness of Breath    Weight Gain     Interval History Status: improved. Patient is alert oriented x3. Vital signs stable. Saturating well on 3 L oxygen. Breathing much better today per patient  Creatinine better than yesterday. Her blood sugars were low this morning. Adjusted Lantus. No other current complaints. Continued on Lasix drip. Brief History:     Juan Ramon William is a 57 y.o. Non- / non  female who presents with Shortness of Breath and Weight Gain   and is admitted to the hospital for the management of Acute respiratory distress.      57year-old female with known medical history of diastolic heart failure, CKD, hypertension, hyperlipidemia, type 2 diabetes mellitus, coronary artery disease with history of CABG presents to the hospital with worsening shortness of breath and leg swelling for last 1 month. Patient states that she was on dialysis after she got contrast for a CT PE scan and was recently taken off dialysis in August 2022 by Dr. Cora Cornelius. Her kidney numbers have remained stable after getting off dialysis. Patient reports that she started noticing increasing swelling of her lower extremities and increasing shortness of breath on minimal exertion. Patient followed up with her PCP who advised patient to get chest x-ray done which showed congestion. Patient was then asked by PCP to come to the hospital.     In the ER patient was noted to be hypertensive with blood pressure 186/88, she was hypoxic saturating 78% on room air. She was initially started on nonrebreather and then switched to BiPAP. On my examination patient was on BiPAP. She was also started on nitro drip for hypertension and pulmonary edema. Her creatinine is around 2.12. Troponin of 82 and 83. Review of Systems:     Constitutional:  negative for chills, fevers, sweats  Respiratory: Positive for shortness of breath. Negative for wheezing, throat swelling. Cardiovascular: Positive for lower extremity edema. Negative for chest pain, palpitations  Gastrointestinal:  negative for abdominal pain, constipation, diarrhea, nausea, vomiting  Neurological:  negative for dizziness, headache    Medications: Allergies: Allergies   Allergen Reactions    Adhesive Tape Other (See Comments) and Rash     Other reaction(s): Unknown    Isosorbide Dinitrate Angioedema    Ranexa [Ranolazine] Angioedema    Metformin And Related Diarrhea    Ace Inhibitors Other (See Comments)     Cough, states not good for her kidneys    Iv Dye [Iodides]      Stage 4 kidney disease    Nsaids      CANNOT TAKE D/T KIDNEY DISEASE    Metformin And Related Hives, Itching and Rash     Stage 4 kidney disease.        Current Meds:   Scheduled Meds:    insulin glargine  40 Units SubCUTAneous BID    carvedilol  12.5 mg Oral BID    tamsulosin  0.4 mg Oral Daily    insulin lispro  0-16 Units SubCUTAneous TID WC    insulin lispro  0-4 Units SubCUTAneous Nightly    ipratropium-albuterol  1 ampule Inhalation Q4H WA    gabapentin  300 mg Oral Nightly    docusate sodium  100 mg Oral BID    [Held by provider] ranolazine  1,000 mg Oral Daily    [Held by provider] isosorbide dinitrate  10 mg Oral TID    aspirin  81 mg Oral Daily    atorvastatin  40 mg Oral Nightly    sodium bicarbonate  1,300 mg Oral BID    pantoprazole  40 mg Oral QAM AC    sodium chloride flush  5-40 mL IntraVENous 2 times per day    heparin (porcine)  5,000 Units SubCUTAneous 3 times per day    amLODIPine  10 mg Oral Daily     Continuous Infusions:    furosemide (LASIX) 1mg/mL infusion 2 mg/hr (02/01/23 0629)    dextrose      sodium chloride       PRN Meds: benzocaine-menthol, melatonin, melatonin, glucose, dextrose bolus **OR** dextrose bolus, glucagon (rDNA), dextrose, sodium chloride flush, sodium chloride, potassium chloride **OR** potassium alternative oral replacement **OR** potassium chloride, magnesium sulfate, ondansetron **OR** ondansetron, polyethylene glycol, acetaminophen **OR** acetaminophen, labetalol, hydrALAZINE    Data:     Past Medical History:   has a past medical history of Arthritis, Backache, unspecified, Cerebral artery occlusion with cerebral infarction ([facility] [address] CHF (congestive heart failure) ([facility] [address] Chronic kidney disease, Coronary atherosclerosis of artery bypass graft, COVID, Cramp of limb, Gallstones, Hemodialysis patient ([facility] [address] Hyperlipidemia, Hypertension, Insomnia, Neuromuscular disorder ([facility] [address] Pneumonia, Psychiatric problem, Thyroid disease, Type II or unspecified type diabetes mellitus with renal manifestations, not stated as uncontrolled(250.40), Type II or unspecified type diabetes mellitus without mention of complication, not stated as uncontrolled, and Unspecified vitamin D deficiency. Social History:   reports that she has never smoked. She has never used smokeless tobacco. She reports that she does not drink alcohol and does not use drugs. Family History:   Family History   Problem Relation Age of Onset    Diabetes Father     Heart Failure Father        Vitals:  /68   Pulse 57   Temp 97.9 °F (36.6 °C) (Temporal)   Resp 12   Ht 5' 5\" (1.651 m)   Wt 253 lb 8.5 oz (115 kg)   SpO2 95%   BMI 42.19 kg/m²   Temp (24hrs), Av.1 °F (36.7 °C), Min:97.2 °F (36.2 °C), Max:98.9 °F (37.2 °C)    Recent Labs     23  0629 23  1159 23  1646 23  1943   POCGLU 72 256* 234* 206*         I/O (24Hr): Intake/Output Summary (Last 24 hours) at 2023 0803  Last data filed at 2023 3183  Gross per 24 hour   Intake --   Output 3590 ml   Net -3590 ml         Labs:  Hematology:  No results for input(s): WBC, RBC, HGB, HCT, MCV, MCH, MCHC, RDW, PLT, MPV, SEDRATE, CRP, INR, DDIMER, LC2BLICD, LABABSO in the last 72 hours.     Invalid input(s): PT    Chemistry:  Recent Labs     23  0207 23  0421 23  2104 23  0538    141  --  142   K 4.3 3.9  --  4.2    104  --  103   CO2 25 25  --  25   GLUCOSE 156* 55*  --  73   BUN 56* 61*  --  65*   CREATININE 2.91* 3.02*  --  2.99*   MG 2.1 2.1  --   --    ANIONGAP 10 12  --  14   LABGLOM 17* 17*  --  17*   CALCIUM 8.3* 8.5*  --  8.8   TROPHS  --   --  68*  --        Recent Labs     23  0537 23  0629 23  1159 23  1646 23   POCGLU 172* 59* 72 256* 234* 206*       ABG:  Lab Results   Component Value Date/Time    PHART 7.429 2022 01:42 PM    KEW3NSU 45.9 2022 01:42 PM    PO2ART 47.6 2022 01:42 PM    SCM0XZK 30.4 2022 01:42 PM    PBEA 6.1 2022 01:42 PM    N2HDLTBF 82.8 2022 01:42 PM    FIO2 INFORMATION NOT PROVIDED 2023 10:59 AM     Lab Results   Component Value Date/Time    SPECIAL RIGHT HAND 2ML 08/03/2022 02:22 AM     Lab Results   Component Value Date/Time    CULTURE NO SIGNIFICANT GROWTH 01/10/2023 02:14 PM       Radiology:  XR CHEST (2 VW)    Result Date: 1/24/2023  Findings suggest congestive heart failure The findings were sent to the Radiology Results [address] at 5:31 pm on 1/24/2023 to be communicated to a licensed caregiver. XR CHEST PORTABLE    Result Date: 1/25/2023  Appearance favoring worsening [facility] vascular congestion/interstitial edema. Physical Examination:        General appearance:  alert, cooperative and no distress. Saturating well on 3 L oxygen via nasal cannula  Mental Status:  oriented to person, place and time and normal affect  HEENT: No significant tongue or throat throat edema. Lungs:  Normal effort. Normal bilateral air entry. Heart:  regular rate and rhythm, no murmur  Abdomen:  soft, nontender, nondistended, bowel sounds are present.   Extremities: 1-2+ pitting bilateral lower extremity edema, no redness, no tenderness in the calves  Skin:  no gross lesions, rashes, induration    Assessment:        Hospital Problems             Last Modified POA    * (Principal) Acute respiratory distress 1/25/2023 Yes    Type 2 diabetes mellitus with hyperglycemia, with long-term current use of insulin ([address] 1/25/2023 Yes    Hypertensive urgency 1/25/2023 Yes    Hypoxia 1/25/2023 Yes    Congestive heart failure ([address] 1/26/2023 Yes    Nephrotic range proteinuria 1/26/2023 Yes    Atherosclerosis of coronary artery bypass graft of native heart without angina pectoris 1/25/2023 Yes    Acute on chronic diastolic heart failure ([address] 1/25/2023 Yes    Diabetic polyneuropathy associated with type 2 diabetes mellitus ([address] 1/25/2023 Yes    History of coronary artery bypass graft 1/25/2023 Yes    Mixed hyperlipidemia 1/25/2023 Yes    CKD (chronic kidney disease) stage 4, GFR 15-29 ml/min ([address] 1/31/2023 Yes    Morbid obesity with BMI of 40.0-44.9, adult ([facility] [address] 1/25/2023 Yes   Plan:        Laryngeal edema. Resolved. Will avoid isosorbide dinitrate and Ranexa at this time due to suspected anaphylaxis. Acute respiratory failure with hypoxia. Secondary to #3. Currently saturating well on 3 L nasal cannula. Acute on chronic diastolic congestive heart failure- on Lasix gtt. currently. Appreciate nephrology recommendations. Elevated troponin likely from worsening renal function. Appreciate cardiology recommendations. Hypertension. Continue amlodipine 10 mg daily, Coreg 12.5 mg twice daily     Stage IV CKD. Creatinine improving. .  Continued on Lasix drip. Appreciate nephrology recommendations. Type 2 diabetes mellitus. Adjusted Lantus to 20 units at night. High-dose corrective algorithm. POCT glucose and hypoglycemia protocol. Hyperlipidemia. Continue Lipitor 40 mg nightly. CAD status post CABG. last cath 2019 showed all patent grafts. Continue aspirin 81 mg daily, Lipitor 40 mg nightly, Coreg 12.5 mg twice daily. Morbid obesity. Encourage lifestyle modification with diet and exercise. DVT prophylaxis: Heparin 5000 units subcutaneously every 8 hours. GI prophylaxis: Protonix 40 mg daily. Discharge planning: Plan is discharged to [facility] once medically stable. Pre-CERT is pending.     Latesha Barreto MD  2/1/2023  8:03 AM

## 2023-02-01 NOTE — PLAN OF CARE
Problem: Discharge Planning  Goal: Discharge to home or other facility with appropriate resources  Outcome: Progressing  Flowsheets (Taken 2/1/2023 0800)  Discharge to home or other facility with appropriate resources: Identify barriers to discharge with patient and caregiver     Problem: Pain  Goal: Verbalizes/displays adequate comfort level or baseline comfort level  Outcome: Progressing     Problem: Safety - Adult  Goal: Free from fall injury  Outcome: Progressing     Problem: Chronic Conditions and Co-morbidities  Goal: Patient's chronic conditions and co-morbidity symptoms are monitored and maintained or improved  Outcome: Progressing  Flowsheets (Taken 2/1/2023 0800)  Care Plan - Patient's Chronic Conditions and Co-Morbidity Symptoms are Monitored and Maintained or Improved: Monitor and assess patient's chronic conditions and comorbid symptoms for stability, deterioration, or improvement     Problem: ABCDS Injury Assessment  Goal: Absence of physical injury  Outcome: Progressing     Problem: Respiratory - Adult  Goal: Achieves optimal ventilation and oxygenation  Outcome: Progressing     Problem: Skin/Tissue Integrity  Goal: Absence of new skin breakdown  Description: 1. Monitor for areas of redness and/or skin breakdown  2. Assess vascular access sites hourly  3. Every 4-6 hours minimum:  Change oxygen saturation probe site  4. Every 4-6 hours:  If on nasal continuous positive airway pressure, respiratory therapy assess nares and determine need for appliance change or resting period.   Outcome: Progressing     Problem: Nutrition Deficit:  Goal: Optimize nutritional status  2/1/2023 1809 by Marbin Ruvalcaba RN  Outcome: Progressing  2/1/2023 1410 by Naya Godoy RD  Flowsheets (Taken 2/1/2023 1410)  Nutrient intake appropriate for improving, restoring, or maintaining nutritional needs:   Assess nutritional status and recommend course of action   Recommend appropriate diets, oral nutritional supplements, and vitamin/mineral supplements   Monitor oral intake, labs, and treatment plans

## 2023-02-02 LAB
ABSOLUTE EOS #: 0.17 K/UL (ref 0–0.44)
ABSOLUTE IMMATURE GRANULOCYTE: <0.03 K/UL (ref 0–0.3)
ABSOLUTE LYMPH #: 0.9 K/UL (ref 1.1–3.7)
ABSOLUTE MONO #: 0.48 K/UL (ref 0.1–1.2)
ANION GAP SERPL CALCULATED.3IONS-SCNC: 11 MMOL/L (ref 9–17)
BASOPHILS # BLD: 1 % (ref 0–2)
BASOPHILS ABSOLUTE: 0.03 K/UL (ref 0–0.2)
BUN SERPL-MCNC: 69 MG/DL (ref 8–23)
CALCIUM SERPL-MCNC: 8.9 MG/DL (ref 8.6–10.4)
CHLORIDE SERPL-SCNC: 101 MMOL/L (ref 98–107)
CO2 SERPL-SCNC: 28 MMOL/L (ref 20–31)
CREAT SERPL-MCNC: 3.05 MG/DL (ref 0.5–0.9)
EOSINOPHILS RELATIVE PERCENT: 3 % (ref 1–4)
GFR SERPL CREATININE-BSD FRML MDRD: 17 ML/MIN/1.73M2
GLUCOSE BLD-MCNC: 162 MG/DL (ref 65–105)
GLUCOSE BLD-MCNC: 276 MG/DL (ref 65–105)
GLUCOSE BLD-MCNC: 290 MG/DL (ref 65–105)
GLUCOSE BLD-MCNC: 326 MG/DL (ref 65–105)
GLUCOSE SERPL-MCNC: 163 MG/DL (ref 70–99)
HCT VFR BLD AUTO: 36.1 % (ref 36.3–47.1)
HGB BLD-MCNC: 11.5 G/DL (ref 11.9–15.1)
IMMATURE GRANULOCYTES: 0 %
LYMPHOCYTES # BLD: 16 % (ref 24–43)
MCH RBC QN AUTO: 31.3 PG (ref 25.2–33.5)
MCHC RBC AUTO-ENTMCNC: 31.9 G/DL (ref 28.4–34.8)
MCV RBC AUTO: 98.4 FL (ref 82.6–102.9)
MONOCYTES # BLD: 9 % (ref 3–12)
NRBC AUTOMATED: 0 PER 100 WBC
PDW BLD-RTO: 14.6 % (ref 11.8–14.4)
PLATELET # BLD AUTO: 134 K/UL (ref 138–453)
PMV BLD AUTO: 12 FL (ref 8.1–13.5)
POTASSIUM SERPL-SCNC: 4.9 MMOL/L (ref 3.7–5.3)
RBC # BLD: 3.67 M/UL (ref 3.95–5.11)
RBC # BLD: ABNORMAL 10*6/UL
SEG NEUTROPHILS: 71 % (ref 36–65)
SEGMENTED NEUTROPHILS ABSOLUTE COUNT: 3.96 K/UL (ref 1.5–8.1)
SODIUM SERPL-SCNC: 140 MMOL/L (ref 135–144)
WBC # BLD AUTO: 5.6 K/UL (ref 3.5–11.3)

## 2023-02-02 PROCEDURE — 6370000000 HC RX 637 (ALT 250 FOR IP): Performed by: STUDENT IN AN ORGANIZED HEALTH CARE EDUCATION/TRAINING PROGRAM

## 2023-02-02 PROCEDURE — 85025 COMPLETE CBC W/AUTO DIFF WBC: CPT

## 2023-02-02 PROCEDURE — 2500000003 HC RX 250 WO HCPCS: Performed by: INTERNAL MEDICINE

## 2023-02-02 PROCEDURE — 99232 SBSQ HOSP IP/OBS MODERATE 35: CPT | Performed by: INTERNAL MEDICINE

## 2023-02-02 PROCEDURE — 2060000000 HC ICU INTERMEDIATE R&B

## 2023-02-02 PROCEDURE — 94761 N-INVAS EAR/PLS OXIMETRY MLT: CPT

## 2023-02-02 PROCEDURE — 97535 SELF CARE MNGMENT TRAINING: CPT

## 2023-02-02 PROCEDURE — 94640 AIRWAY INHALATION TREATMENT: CPT

## 2023-02-02 PROCEDURE — 6370000000 HC RX 637 (ALT 250 FOR IP)

## 2023-02-02 PROCEDURE — 36415 COLL VENOUS BLD VENIPUNCTURE: CPT

## 2023-02-02 PROCEDURE — 6370000000 HC RX 637 (ALT 250 FOR IP): Performed by: NURSE PRACTITIONER

## 2023-02-02 PROCEDURE — 97530 THERAPEUTIC ACTIVITIES: CPT

## 2023-02-02 PROCEDURE — 99232 SBSQ HOSP IP/OBS MODERATE 35: CPT | Performed by: STUDENT IN AN ORGANIZED HEALTH CARE EDUCATION/TRAINING PROGRAM

## 2023-02-02 PROCEDURE — 2700000000 HC OXYGEN THERAPY PER DAY

## 2023-02-02 PROCEDURE — 97116 GAIT TRAINING THERAPY: CPT

## 2023-02-02 PROCEDURE — 82947 ASSAY GLUCOSE BLOOD QUANT: CPT

## 2023-02-02 PROCEDURE — 97110 THERAPEUTIC EXERCISES: CPT

## 2023-02-02 PROCEDURE — 2580000003 HC RX 258: Performed by: STUDENT IN AN ORGANIZED HEALTH CARE EDUCATION/TRAINING PROGRAM

## 2023-02-02 PROCEDURE — 6370000000 HC RX 637 (ALT 250 FOR IP): Performed by: CLINICAL NURSE SPECIALIST

## 2023-02-02 PROCEDURE — 80048 BASIC METABOLIC PNL TOTAL CA: CPT

## 2023-02-02 PROCEDURE — 6360000002 HC RX W HCPCS: Performed by: STUDENT IN AN ORGANIZED HEALTH CARE EDUCATION/TRAINING PROGRAM

## 2023-02-02 RX ORDER — METOLAZONE 5 MG/1
5 TABLET ORAL DAILY
Status: DISCONTINUED | OUTPATIENT
Start: 2023-02-02 | End: 2023-02-06

## 2023-02-02 RX ADMIN — INSULIN LISPRO 8 UNITS: 100 INJECTION, SOLUTION INTRAVENOUS; SUBCUTANEOUS at 16:24

## 2023-02-02 RX ADMIN — IPRATROPIUM BROMIDE AND ALBUTEROL SULFATE 1 AMPULE: 2.5; .5 SOLUTION RESPIRATORY (INHALATION) at 20:24

## 2023-02-02 RX ADMIN — SODIUM CHLORIDE, PRESERVATIVE FREE 10 ML: 5 INJECTION INTRAVENOUS at 08:29

## 2023-02-02 RX ADMIN — AMLODIPINE BESYLATE 10 MG: 10 TABLET ORAL at 08:29

## 2023-02-02 RX ADMIN — GABAPENTIN 300 MG: 300 CAPSULE ORAL at 20:21

## 2023-02-02 RX ADMIN — DOCUSATE SODIUM 100 MG: 100 CAPSULE ORAL at 08:29

## 2023-02-02 RX ADMIN — HEPARIN SODIUM 5000 UNITS: 5000 INJECTION INTRAVENOUS; SUBCUTANEOUS at 16:24

## 2023-02-02 RX ADMIN — INSULIN LISPRO 12 UNITS: 100 INJECTION, SOLUTION INTRAVENOUS; SUBCUTANEOUS at 12:22

## 2023-02-02 RX ADMIN — ATORVASTATIN CALCIUM 40 MG: 80 TABLET, FILM COATED ORAL at 20:21

## 2023-02-02 RX ADMIN — ASPIRIN 81 MG: 81 TABLET, CHEWABLE ORAL at 08:29

## 2023-02-02 RX ADMIN — DOCUSATE SODIUM 100 MG: 100 CAPSULE ORAL at 20:21

## 2023-02-02 RX ADMIN — TAMSULOSIN HYDROCHLORIDE 0.4 MG: 0.4 CAPSULE ORAL at 08:29

## 2023-02-02 RX ADMIN — INSULIN GLARGINE 20 UNITS: 100 INJECTION, SOLUTION SUBCUTANEOUS at 20:20

## 2023-02-02 RX ADMIN — Medication 10 MG: at 22:33

## 2023-02-02 RX ADMIN — HEPARIN SODIUM 5000 UNITS: 5000 INJECTION INTRAVENOUS; SUBCUTANEOUS at 08:29

## 2023-02-02 RX ADMIN — PANTOPRAZOLE SODIUM 40 MG: 40 TABLET, DELAYED RELEASE ORAL at 06:24

## 2023-02-02 RX ADMIN — SODIUM CHLORIDE, PRESERVATIVE FREE 10 ML: 5 INJECTION INTRAVENOUS at 20:21

## 2023-02-02 RX ADMIN — SODIUM BICARBONATE 1300 MG: 648 TABLET ORAL at 20:21

## 2023-02-02 RX ADMIN — IPRATROPIUM BROMIDE AND ALBUTEROL SULFATE 1 AMPULE: 2.5; .5 SOLUTION RESPIRATORY (INHALATION) at 11:29

## 2023-02-02 RX ADMIN — METOLAZONE 5 MG: 5 TABLET ORAL at 12:22

## 2023-02-02 RX ADMIN — CARVEDILOL 12.5 MG: 12.5 TABLET, FILM COATED ORAL at 20:21

## 2023-02-02 RX ADMIN — BUMETANIDE 2 MG: 0.25 INJECTION INTRAMUSCULAR; INTRAVENOUS at 20:20

## 2023-02-02 RX ADMIN — INSULIN GLARGINE 20 UNITS: 100 INJECTION, SOLUTION SUBCUTANEOUS at 08:27

## 2023-02-02 RX ADMIN — IPRATROPIUM BROMIDE AND ALBUTEROL SULFATE 1 AMPULE: 2.5; .5 SOLUTION RESPIRATORY (INHALATION) at 07:37

## 2023-02-02 RX ADMIN — IPRATROPIUM BROMIDE AND ALBUTEROL SULFATE 1 AMPULE: 2.5; .5 SOLUTION RESPIRATORY (INHALATION) at 15:22

## 2023-02-02 RX ADMIN — CARVEDILOL 12.5 MG: 12.5 TABLET, FILM COATED ORAL at 08:30

## 2023-02-02 RX ADMIN — SODIUM BICARBONATE 1300 MG: 648 TABLET ORAL at 08:29

## 2023-02-02 RX ADMIN — BUMETANIDE 2 MG: 0.25 INJECTION INTRAMUSCULAR; INTRAVENOUS at 08:29

## 2023-02-02 NOTE — PROGRESS NOTES
Nephrology Progress Note      SUBJECTIVE      Patient seen and examined bedside  No acute issues overnight  Patient denies any new symptoms  Reports shortness of breath, reports feeling the same since the past 2 days  Did work with physical therapy this morning did okay    Diuretics have been changed from Lasix infusion to Bumex 2 mg IV twice daily yesterday  Has been afebrile, heart rate and blood pressure okay, saturating well on 4 L nasal cannula  Morning labs showed sodium 140, potassium 4.9, BUN 69, creatinine 3.05  Urine output last 24 hours 3 L, according to the nurse since midnight to this morning 8 am only 160 cc out, from 8 am to 11 am 260 cc out ? Inaccurate charting      HPI -     This is a 57 y.o. female who presented to the hospital for evaluation of increasing edema, increasing weight gain of about 8 to 9 pounds, and a progressive exertional dyspnea. The symptoms started about 8 or 9 days ago, she did seek medical attention the PCP who ordered a chest x-ray and was concerned about some congestive heart failure. Patient was contacted and advised to come to the hospital.     She is an office patient of Dr. Omer French. Previously she has had acute on chronic renal injury and ended up on dialysis for few months secondary likely to contrast associated renal injury and as of August 2022 she has been off dialysis. Her serum creatinine seems to be hovering around 2.2 -2.4 range. At home she typically takes furosemide twice daily (120 mg twice a day). She tells me she has been very compliant in regards to her that medication use. She does not give any history of recent change in her diet, increasing salt intake, noncompliance to medications, does watch her fluids at home as well. She is going having some exertional dyspnea along with. She denies any palpitations or any episodes of syncope. She did not give any history of orthopnea. She denies any NSAID use.      Patient has had a longstanding history of diabetes mellitus type 2. Her CKD was originally thought to be related to diabetic renal disease. She does also have history of essential hypertension, dyslipidemia, diastolic CHF. She was last evaluated in the office approximately 4 to 5 weeks ago at which time her physical exam and her labs were actually relatively stable. Admission chest x-ray showed evidence of vascular congestion  Last cardiac echo from 2022 showed evidence of mild diastolic dysfunction, LV function preserved at greater than 55%, moderate left ventricular hypertrophy. On review of most recent urine studies, she seems to have developed worsening proteinuria. Her proteinuria last year was quantified somewhere around 2.5 g and most recent studies indicate proteinuria to be almost at 5 g. She denies any history of diabetic retinopathy but does admit to symptoms of neuropathy. Pt denies any hx of heavy or prolonged NSAID use. There is no history of blood or bone marrow disorders. There is no hx of jaundice or hepatitis or sexually transmitted disease. Pt has no hx of collagen vascular disease or vasculitis. There is no hx of paraprotein disease. No hx of recurrent UTI , incontinence or recurrent nephrolithiasis.          Started on IV Lasix infusion on 2023, stop Lasix infusion on 2023, started on Bumex IV 2 mg twice daily    OBJECTIVE      CURRENT TEMPERATURE:  Temp: 98.9 °F (37.2 °C)  MAXIMUM TEMPERATURE OVER 24HRS:  Temp (24hrs), Av.3 °F (36.8 °C), Min:97.8 °F (36.6 °C), Max:98.9 °F (37.2 °C)    CURRENT RESPIRATORY RATE:  Resp: 17  CURRENT PULSE:  Heart Rate: 58  CURRENT BLOOD PRESSURE:  BP: 129/62  24HR BLOOD PRESSURE RANGE:  Systolic (07OUE), PNO:357 , Min:120 , DXS:195   ; Diastolic (33CHU), ZYO:03, Min:49, Max:82    24HR INTAKE/OUTPUT:    Intake/Output Summary (Last 24 hours) at 2023 1000  Last data filed at 2023 0830  Gross per 24 hour   Intake --   Output 2970 ml   Net -2970 ml PHYSICAL EXAM      GENERAL APPEARANCE:Awake, alert, in no acute distress  SKIN: warm and dry, no rash or erythema  EYES: conjunctivae normal and sclera anicteric  ENT: no thrush no pharyngeal congestion   NECK:  JVD: None   PULMONARY: Bilateral inspiratory crackles  CADRDIOVASCULAR: Normal heart sounds  ABDOMEN: Soft and distended  EXTREMITIES: Bilateral pitting pedal edema    CURRENT MEDICATIONS      bumetanide (BUMEX) injection 2 mg, BID  insulin glargine (LANTUS) injection vial 20 Units, BID  carvedilol (COREG) tablet 12.5 mg, BID  tamsulosin (FLOMAX) capsule 0.4 mg, Daily  insulin lispro (HUMALOG) injection vial 0-16 Units, TID WC  insulin lispro (HUMALOG) injection vial 0-4 Units, Nightly  ipratropium-albuterol (DUONEB) nebulizer solution 1 ampule, Q4H WA  benzocaine-menthol (CEPACOL SORE THROAT) lozenge 1 lozenge, Q2H PRN  gabapentin (NEURONTIN) capsule 300 mg, Nightly  melatonin tablet 3 mg, Nightly PRN  melatonin tablet 10 mg, Nightly PRN  glucose chewable tablet 16 g, PRN  dextrose bolus 10% 125 mL, PRN   Or  dextrose bolus 10% 250 mL, PRN  glucagon (rDNA) injection 1 mg, PRN  dextrose 10 % infusion, Continuous PRN  docusate sodium (COLACE) capsule 100 mg, BID  [Held by provider] ranolazine (RANEXA) extended release tablet 1,000 mg, Daily  [Held by provider] isosorbide dinitrate (ISORDIL) tablet 10 mg, TID  aspirin chewable tablet 81 mg, Daily  atorvastatin (LIPITOR) tablet 40 mg, Nightly  sodium bicarbonate tablet 1,300 mg, BID  pantoprazole (PROTONIX) tablet 40 mg, QAM AC  sodium chloride flush 0.9 % injection 5-40 mL, 2 times per day  sodium chloride flush 0.9 % injection 10 mL, PRN  0.9 % sodium chloride infusion, PRN  potassium chloride (KLOR-CON M) extended release tablet 40 mEq, PRN   Or  potassium bicarb-citric acid (EFFER-K) effervescent tablet 40 mEq, PRN   Or  potassium chloride 10 mEq/100 mL IVPB (Peripheral Line), PRN  magnesium sulfate 1000 mg in dextrose 5% 100 mL IVPB, PRN  ondansetron (ZOFRAN-ODT) disintegrating tablet 4 mg, Q8H PRN   Or  ondansetron (ZOFRAN) injection 4 mg, Q6H PRN  polyethylene glycol (GLYCOLAX) packet 17 g, Daily PRN  acetaminophen (TYLENOL) tablet 650 mg, Q6H PRN   Or  acetaminophen (TYLENOL) suppository 650 mg, Q6H PRN  heparin (porcine) injection 5,000 Units, 3 times per day  labetalol (NORMODYNE;TRANDATE) injection 10 mg, Q6H PRN  amLODIPine (NORVASC) tablet 10 mg, Daily  hydrALAZINE (APRESOLINE) injection 10 mg, Q6H PRN          LABS      CBC:   Recent Labs     02/01/23  0538 02/02/23  0642   WBC 6.0 5.6   RBC 3.72* 3.67*   HGB 11.7* 11.5*   HCT 37.3 36.1*   .3 98.4   MCH 31.5 31.3   MCHC 31.4 31.9   RDW 14.9* 14.6*    134*   MPV 12.2 12.0      BMP:   Recent Labs     01/31/23  0421 02/01/23  0538 02/02/23  0642    142 140   K 3.9 4.2 4.9    103 101   CO2 25 25 28   BUN 61* 65* 69*   CREATININE 3.02* 2.99* 3.05*   GLUCOSE 55* 73 163*   CALCIUM 8.5* 8.8 8.9      BNP:  Lab Results   Component Value Date/Time    BNP 41 04/09/2015 12:00 AM     PHOSPHORUS:  No results for input(s): PHOS in the last 72 hours. MAGNESIUM:   Recent Labs     01/31/23  0421   MG 2.1     ALBUMIN: No results for input(s): LABALBU in the last 72 hours.   IRON:    Lab Results   Component Value Date/Time    IRON 83 09/27/2022 01:32 PM     IRON SATURATION:    Lab Results   Component Value Date/Time    LABIRON 38 09/27/2022 01:32 PM     TIBC:    Lab Results   Component Value Date/Time    TIBC 217 09/27/2022 01:32 PM     FERRITIN:    Lab Results   Component Value Date/Time    FERRITIN 117 09/27/2022 01:32 PM     RUSSELL:   Lab Results   Component Value Date    RUSSELL NEGATIVE 08/03/2022       SPEP:   Lab Results   Component Value Date/Time    PROT 7.0 12/01/2022 10:25 AM     UPEP:   Lab Results   Component Value Date/Time    TPU 20 11/12/2021 10:30 AM      HEPBSAG:  Lab Results   Component Value Date/Time    HEPBSAG NONREACTIVE 03/30/2022 03:30 PM     HEPCAB:  Lab Results   Component Value Date/Time    HEPCAB NONREACTIVE 03/30/2022 03:30 PM     C3: No results found for: C3  C4: No results found for: C4  MPO ANCA: No results found for: MPO . PR3 ANCA:  No results found for: PR3  URINE SODIUM:    Lab Results   Component Value Date/Time    JASON 47 11/14/2021 02:04 AM      URINE POTASSIUM:  No results found for: KUR  URINE CHLORIDE:  No results found for: CLU  URINE PH:  No components found for: PO4U  URINE OSMOLARITY:  No results found for: OSMOU  URINE CREATININE:    Lab Results   Component Value Date/Time    LABCREA 65.4 01/26/2023 01:10 PM     URINE EOSINOPHILS: No components found for: EOSU  URINE PROTEIN:    Lab Results   Component Value Date/Time    TPU 20 11/12/2021 10:30 AM     URINALYSIS:  U/A:   Lab Results   Component Value Date/Time    NITRU NEGATIVE 01/10/2023 02:14 PM    COLORU Yellow 01/10/2023 02:14 PM    PHUR 7.0 01/10/2023 02:14 PM    WBCUA 2 TO 5 01/10/2023 02:14 PM    RBCUA 0 TO 2 01/10/2023 02:14 PM    MUCUS 2+ 07/31/2022 12:00 PM    TRICHOMONAS NOT REPORTED 11/14/2021 02:05 AM    YEAST FEW 07/02/2022 09:10 AM    BACTERIA FEW 01/10/2023 02:14 PM    SPECGRAV 1.021 01/10/2023 02:14 PM    LEUKOCYTESUR SMALL 01/10/2023 02:14 PM    UROBILINOGEN Normal 01/10/2023 02:14 PM    BILIRUBINUR NEGATIVE 01/10/2023 02:14 PM    GLUCOSEU NEGATIVE 01/10/2023 02:14 PM    KETUA NEGATIVE 01/10/2023 02:14 PM    AMORPHOUS 2+ 07/31/2022 12:00 PM     ANTIGBM:No results found for: GBMABIGG      RADIOLOGY      Reviewed as available. ASSESSMENT      1. CKD 4 likely due to underlying diabetic renal involvement. Was on dialysis for several months, got off of dialysis about 5-1/2 months ago and later taken off of HD. Her creatinine currently seems to be running around high 2's to low 3's.    2.  Acute on chronic diastolic CHF  3. Hypertensive urgency  3. Acute on chronic hypoxic respiratory failure  4. Diabetes mellitus type 2  5. CAD s/p CABG, last cath in October 2019  6. Essential hypertension  7. Obesity  8. History of DVT    PLAN      1. Continue diuresis. Add Zaroxolyn   2. Strict I's and O's, daily weights, charting has not been accurate in the past 2 days  3. Continue to hold ARB for now  4. BMP in the a.m.  5.  We will follow  6. Final recommendations to follow after discussing with attending      Breana Fletcher  PGY-2  Internal Medicine  [facility]   11:09 AM 2/2/2023     Attending Physician Statement  I have discussed the care of Danitza Mcdonough, including pertinent history and exam findings,  with the Fellow/Residentt. I have reviewed the key elements of all parts of the encounter with the Fellow/ Resident. I agree with the assessment, plan and orders as documented by the resident.     Cinthia Schofield MD MD, [facility], [address]   2/2/2023 11:34 AM    Nephrology [address] [place] [address]

## 2023-02-02 NOTE — PLAN OF CARE
Problem: Respiratory - Adult  Goal: Achieves optimal ventilation and oxygenation  2/2/2023 1658 by MAGI Perry  Outcome: Progressing   BRONCHOSPASM/BRONCHOCONSTRICTION     [x]         IMPROVE AERATION/BREATH SOUNDS  [x]   ADMINISTER BRONCHODILATOR THERAPY AS APPROPRIATE  [x]   ASSESS BREATH SOUNDS  []   IMPLEMENT AEROSOL/MDI PROTOCOL  [x]   PATIENT EDUCATION AS NEEDED   PROVIDE ADEQUATE OXYGENATION WITH ACCEPTABLE SP02/ABG'S    [x]  IDENTIFY APPROPRIATE OXYGEN THERAPY  [x]   MONITOR SP02/ABG'S AS NEEDED   [x]   PATIENT EDUCATION AS NEEDED

## 2023-02-02 NOTE — PROGRESS NOTES
Dr. Esha Pena and Dr. Thao Rick notified of patient's low urine output, 160 ml rosa output charted from midnight to 0800 Pt states that for tears she has been having intermittent c/p and saw her pmd yesterday who did an EKG and wants her to f/u with dr jama. She was unable to get an appoint ment until 6/8 and was sent here

## 2023-02-02 NOTE — PROGRESS NOTES
Occupational Therapy  Facility/Department: [address] STEPDOWN  Occupational Therapy Daily Treatment Note    Name: Keyonna Fontaine  : 1960  MRN: [id number]  Date of Service: 2023    Discharge Recommendations:  Patient would benefit from continued therapy after discharge  OT Equipment Recommendations  Other: AE // DME recommendations for DC are ongoing. Patient Diagnosis(es): The primary encounter diagnosis was Congestive heart failure, unspecified HF chronicity, unspecified heart failure type ([address] A diagnosis of Hypoxia was also pertinent to this visit. Past Medical History:  has a past medical history of Arthritis, Backache, unspecified, Cerebral artery occlusion with cerebral infarction ([address] CHF (congestive heart failure) ([address] Chronic kidney disease, Coronary atherosclerosis of artery bypass graft, COVID, Cramp of limb, Gallstones, Hemodialysis patient ([address] Hyperlipidemia, Hypertension, Insomnia, Neuromuscular disorder ([address] Pneumonia, Psychiatric problem, Thyroid disease, Type II or unspecified type diabetes mellitus with renal manifestations, not stated as uncontrolled(250.40), Type II or unspecified type diabetes mellitus without mention of complication, not stated as uncontrolled, and Unspecified vitamin D deficiency. Past Surgical History:  has a past surgical history that includes Coronary artery bypass graft; Knee arthroscopy; Carpal tunnel release; Breast surgery; Tonsillectomy; Hand surgery; Ankle fracture surgery; Cholecystectomy, open (N/A); IR TUNNELED CVC PLACE WO SQ PORT/PUMP > 5 YEARS (2021); AV fistula creation (2021); Dialysis fistula creation (Left, 2021); other surgical history (2022); back surgery; Colonoscopy; eye surgery; fracture surgery; and Cardiac surgery. Assessment   Performance deficits / Impairments: Decreased functional mobility ; Decreased endurance;Decreased coordination;Decreased ADL status; Decreased balance;Decreased strength;Decreased safe awareness;Decreased high-level IADLs;Decreased cognition  Prognosis: Good  REQUIRES OT FOLLOW-UP: Yes  Activity Tolerance  Activity Tolerance: Patient Tolerated treatment well;Patient limited by fatigue        Plan   Occupational Therapy Plan  Times Per Week: 3-5x/week  Current Treatment Recommendations: Strengthening, Balance training, Functional mobility training, Endurance training, Equipment evaluation, education, & procurement, Patient/Caregiver education & training, Safety education & training, Self-Care / ADL, Home management training, Cognitive/Perceptual training, Coordination training     Restrictions  Restrictions/Precautions  Restrictions/Precautions: Fall Risk  Required Braces or Orthoses?: No  Position Activity Restriction  Other position/activity restrictions: Up with Assist.  Monitor SpO2. Subjective   General  Chart Reviewed: Yes  Patient assessed for rehabilitation services?: Yes  Response to previous treatment: Patient with no complaints from previous session  Family / Caregiver Present: No  Subjective  Subjective: Pt reported that she did not have any pain this date. Pt reported feeling occasional SOB when completing functional mobility. Pt was able to progress with therapy session this date. General Comment  Comments: RN okayed for OT session this AM. Pt was agreeable and cooperative. Upon GARCIA arrival pt was supine in bed w/ HOB elevated. Pt completed bed mobility, functional mobility and retired to chair, where ADLs where completed. Pt stayed in chair at end of session. Pt had call light within reach, all needs met and RN notified. Writer required extended time with pt therapy d/t contact precautions while working with pt. Objective   O2 Device: Nasal cannula, 4.5 L    Safety Devices  Type of Devices: Gait belt;Call light within reach;Nurse notified; All fall risk precautions in place; Left in chair  Restraints  Restraints Initially in Place: No  Balance  Sitting: Without support (pt sat EOB ~12-15 mins, prior to functional mobility. SUP)  Standing: With support (w/ RW, 2Xs; total ~2 mins prior/post func mob., CGA)  Transfer Training  Transfer Training: Yes  Overall Level of Assistance: Minimum assistance; Additional time; Adaptive equipment (w/ RW)  Interventions: Safety awareness training  Sit to Stand: Minimum assistance; Adaptive equipment; Additional time  Stand to Sit: Minimum assistance; Additional time; Adaptive equipment (w/ RW)  Gait  Overall Level of Assistance: Additional time; Adaptive equipment;Contact-guard assistance (Pt completed functional mobility from EOB to hallway and multiple hallway length. Pt required rest break during functional mobility before returning to chair in room. Pt on 6L of O2 during func mob. Took pt ~5-8 mins to complete functional mobility this date.)  Interventions: Safety awareness training  Assistive Device: Walker, rolling;Gait belt    ADL  Grooming: Verbal cueing; Increased time to complete;Supervision  Grooming Skilled Clinical Factors: Pt completed applying lotion on stomach and LE at EOB this date. Pt completed oral hygiene, washed face and laid warm wet wash cloth on face multiple time to help with moister in nose. Pt completed while seated chair. Pt completed tasks this date and returned to resting in chair. UE Dressing: Increased time to complete;Contact guard assistance  UE Dressing Skilled Clinical Factors: Sitting EOB pt doffed/donned dirty/clean gown and donned another gown like a robe. Assisted with tying and buttoning gown. LE Dressing: Moderate assistance; Increased time to complete;Verbal cueing  LE Dressing Skilled Clinical Factors: Sitting EOB, Pt required Assist to doff/don Bilateral socks this date. Mod A d/t lack of mobility in trunk, pt would benefit from sock aid. Will bring for next therapy session. Additional Comments: Pt would benefit from sock-aid when donning socks.  Will plan to bring to next session. Pt was seated in chair for ~10-15 mins while completing ADLs this date. Pt required increased time d/t thoroughness for oral hygiene and taking time to increase moisture in nose. Bed mobility  Supine to Sit: Minimal assistance  Sit to Supine: Unable to assess (pt retired to recliner)  [employer] Mobility Comments: HOB elevated ~40 degrees with use of bed rail;  Transfers  Sit to stand: Minimal assistance (w/ RW)  Stand to sit: Minimal assistance (w/ RW)     Cognition  Overall Cognitive Status: WFL  Orientation  Overall Orientation Status: Within Normal Limits  Orientation Level: Oriented X4    Education Given To: Patient  Education Provided: Role of Therapy;Plan of Care;Precautions; Equipment;ADL Adaptive Strategies;Transfer Training; Fall Prevention Strategies; Energy Conservation  Education Provided Comments: Role of therapy, POC, precautions with letting body regulate prior to standing and completing func mob, placement of hands on edge of bed prior to standing, EC when completing func mob and taking rest breaks when needed, techniques for deep breathing to help with calming self when needed - good return  Education Method: Demonstration;Verbal  Barriers to Learning: Vision  Education Outcome: Verbalized understanding;Continued education needed    AM-PAC Score  AM-[facility] Inpatient Daily Activity Raw Score: 17 (02/02/23 1248)  AM-PAC Inpatient ADL T-Scale Score : 37.26 (02/02/23 1248)  ADL Inpatient CMS 0-100% Score: 50.11 (02/02/23 1248)  ADL Inpatient CMS G-Code Modifier : CK (02/02/23 1248)      Goals  Short Term Goals  Time Frame for Short Term Goals: By Discharge, Pt will -  Short Term Goal 1: Demo Mod I and Good Integration of EC // Ax pacing during ADLs. Short Term Goal 2: Demo Sup Assist and Fair+ Integration of AE // DME during LB ADLs and Toileting. Short Term Goal 3: Complete Functional ADL and Bathroom Transfers with SBA and without LOB.   Short Term Goal 4: Demo Functional Mobility (in-room // in-home negotiation) with SBA while maintaining SpO2 >90%.        Therapy Time   Individual Concurrent Group Co-treatment   Time In 5477         Time Out 1052         Minutes 71         Timed Code Treatment Minutes: Bartolo Bean 405, GARCIA/L

## 2023-02-02 NOTE — PROGRESS NOTES
[facility]  Office: [address], DO, Amanda Jones, DO, Nelida Wilson, DO, Massiel Gill, DO, Nathan Denson MD, Jennifer Zhong MD, Florencia Presley MD, Shamar Lopes MD,  Jani Teixeira MD, Giovanna Johns MD, José Antonio Guadalupe, DO, Mariaa Sykes MD,  Luz Sanchez MD, Manoj Felix MD, Theresa Mac DO, Luisa Cunningham MD, Mercy Oliver MD, Arslan Farias DO, Franklin Linares MD, Alysa Cm MD, Sulaiman Nathan MD, Mckay Arrieta MD, Cassia Joy DO, Ofelia Dickerson MD, Migdalia Salamanca MD, Berenice Esquivel, Hafsa Kelley, CNP, Mitali Beth, CNP, Ruthy Sanchez, CNP,  Milly Das, [facility], Khris Howell, CNP, Delia Chappell, CNP, Israel Weiss, CNP, Junie Lamar, CNP, Emmanuel Bocanegra, CNP, Ashley Vital PA-C, Rachelle Chiu, CNS, Tonja Zapata, CNP, Ida Gonzalez, CNP         Enrike Rodgers [address]    Progress Note    2/2/2023    8:34 AM    Name:   Enrike Espinoza  MRN:     [id number]     Acct:      [de-identified]   Room:   [address] [place] [address] Day:  8  Admit Date:  1/25/2023 10:13 AM    PCP:   AFSANEH Bragg CNP  Code Status:  Full Code    Subjective:     C/C:   Chief Complaint   Patient presents with    Shortness of Breath    Weight Gain     Interval History Status: improved. Patient is alert oriented x3. Vital signs stable. Saturating well on 3 L oxygen. Breathing much better today per patient  Creatinine little worse than yesterday. Continued on Bumex. Added metolazone  Her blood sugars are well controlled. No other current complaints. Brief History:     Enrike Espinoza is a 57 y.o. Non- / non  female who presents with Shortness of Breath and Weight Gain   and is admitted to the hospital for the management of Acute respiratory distress.      57year-old female with known medical history of diastolic heart failure, CKD, hypertension, hyperlipidemia, type 2 diabetes mellitus, coronary artery disease with history of CABG presents to the hospital with worsening shortness of breath and leg swelling for last 1 month.  Patient states that she was on dialysis after she got contrast for a CT PE scan and was recently taken off dialysis in August 2022 by Dr. Darnell.  Her kidney numbers have remained stable after getting off dialysis.  Patient reports that she started noticing increasing swelling of her lower extremities and increasing shortness of breath on minimal exertion.  Patient followed up with her PCP who advised patient to get chest x-ray done which showed congestion.  Patient was then asked by PCP to come to the hospital.     In the ER patient was noted to be hypertensive with blood pressure 186/88, she was hypoxic saturating 78% on room air.  She was initially started on nonrebreather and then switched to BiPAP.  On my examination patient was on BiPAP.  She was also started on nitro drip for hypertension and pulmonary edema.  Her creatinine is around 2.12.  Troponin of 82 and 83.    Review of Systems:     Constitutional:  negative for chills, fevers, sweats  Respiratory: Positive for shortness of breath.   Negative for wheezing, throat swelling.  Cardiovascular: Positive for lower extremity edema. Negative for chest pain, palpitations  Gastrointestinal:  negative for abdominal pain, constipation, diarrhea, nausea, vomiting  Neurological:  negative for dizziness, headache    Medications:     Allergies:    Allergies   Allergen Reactions    Adhesive Tape Other (See Comments) and Rash     Other reaction(s): Unknown    Isosorbide Dinitrate Angioedema    Ranexa [Ranolazine] Angioedema    Metformin And Related Diarrhea    Ace Inhibitors Other (See Comments)     Cough, states not good for her kidneys    Iv Dye [Iodides]      Stage 4 kidney disease    Nsaids      CANNOT TAKE D/T KIDNEY DISEASE    Metformin And Related Hives, Itching and Rash     Stage 4 kidney disease.       Current  Meds:   Scheduled Meds:    bumetanide  2 mg IntraVENous BID    insulin glargine  20 Units SubCUTAneous BID    carvedilol  12.5 mg Oral BID    tamsulosin  0.4 mg Oral Daily    insulin lispro  0-16 Units SubCUTAneous TID WC    insulin lispro  0-4 Units SubCUTAneous Nightly    ipratropium-albuterol  1 ampule Inhalation Q4H WA    gabapentin  300 mg Oral Nightly    docusate sodium  100 mg Oral BID    [Held by provider] ranolazine  1,000 mg Oral Daily    [Held by provider] isosorbide dinitrate  10 mg Oral TID    aspirin  81 mg Oral Daily    atorvastatin  40 mg Oral Nightly    sodium bicarbonate  1,300 mg Oral BID    pantoprazole  40 mg Oral QAM AC    sodium chloride flush  5-40 mL IntraVENous 2 times per day    heparin (porcine)  5,000 Units SubCUTAneous 3 times per day    amLODIPine  10 mg Oral Daily     Continuous Infusions:    dextrose      sodium chloride       PRN Meds: benzocaine-menthol, melatonin, melatonin, glucose, dextrose bolus **OR** dextrose bolus, glucagon (rDNA), dextrose, sodium chloride flush, sodium chloride, potassium chloride **OR** potassium alternative oral replacement **OR** potassium chloride, magnesium sulfate, ondansetron **OR** ondansetron, polyethylene glycol, acetaminophen **OR** acetaminophen, labetalol, hydrALAZINE    Data:     Past Medical History:   has a past medical history of Arthritis, Backache, unspecified, Cerebral artery occlusion with cerebral infarction ([facility] CHF (congestive heart failure) ([facility] Chronic kidney disease, Coronary atherosclerosis of artery bypass graft, COVID, Cramp of limb, Gallstones, Hemodialysis patient ([facility] Hyperlipidemia, Hypertension, Insomnia, Neuromuscular disorder ([facility] Pneumonia, Psychiatric problem, Thyroid disease, Type II or unspecified type diabetes mellitus with renal manifestations, not stated as uncontrolled(250.40), Type II or unspecified type diabetes mellitus without mention of complication, not stated as uncontrolled, and Unspecified vitamin D deficiency. Social History:   reports that she has never smoked. She has never used smokeless tobacco. She reports that she does not drink alcohol and does not use drugs. Family History:   Family History   Problem Relation Age of Onset    Diabetes Father     Heart Failure Father        Vitals:  /62   Pulse 58   Temp 98.9 °F (37.2 °C) (Temporal)   Resp 17   Ht 5' 5\" (1.651 m)   Wt 251 lb 1.7 oz (113.9 kg)   SpO2 93%   BMI 41.79 kg/m²   Temp (24hrs), Av.3 °F (36.8 °C), Min:97.8 °F (36.6 °C), Max:98.9 °F (37.2 °C)    Recent Labs     23  1239 23  1536 23  1954 23  0800   POCGLU 298* 370* 297* 162*         I/O (24Hr):     Intake/Output Summary (Last 24 hours) at 2023 0834  Last data filed at 2023 0615  Gross per 24 hour   Intake --   Output 3090 ml   Net -3090 ml         Labs:  Hematology:  Recent Labs     23  0538 23  0642   WBC 6.0 5.6   RBC 3.72* 3.67*   HGB 11.7* 11.5*   HCT 37.3 36.1*   .3 98.4   MCH 31.5 31.3   MCHC 31.4 31.9   RDW 14.9* 14.6*    134*   MPV 12.2 12.0       Chemistry:  Recent Labs     23  0421 23  2104 23  0538 23  0642     --  142 140   K 3.9  --  4.2 4.9     --  103 101   CO2 25  --  25 28   GLUCOSE 55*  --  73 163*   BUN 61*  --  65* 69*   CREATININE 3.02*  --  2.99* 3.05*   MG 2.1  --   --   --    ANIONGAP 12  --  14 11   LABGLOM 17*  --  17* 17*   CALCIUM 8.5*  --  8.8 8.9   TROPHS  --  68*  --   --        Recent Labs     23  0824 23  0859 23  1239 23  1536 23  1954 23  0800   POCGLU 53* 88 298* 370* 297* 162*       ABG:  Lab Results   Component Value Date/Time    PHART 7.429 2022 01:42 PM    HIN1SNE 45.9 2022 01:42 PM    PO2ART 47.6 2022 01:42 PM    RZN2ZZZ 30.4 2022 01:42 PM    PBEA 6.1 2022 01:42 PM    J0MTCCND 82.8 2022 01:42 PM    FIO2 INFORMATION NOT PROVIDED 2023 10:59 AM     Lab Results   Component Value Date/Time    SPECIAL RIGHT HAND 2ML 08/03/2022 02:22 AM     Lab Results   Component Value Date/Time    CULTURE NO SIGNIFICANT GROWTH 01/10/2023 02:14 PM       Radiology:  XR CHEST (2 VW)    Result Date: 1/24/2023  Findings suggest congestive heart failure The findings were sent to the Radiology Results [address] at 5:31 pm on 1/24/2023 to be communicated to a licensed caregiver. XR CHEST PORTABLE    Result Date: 1/25/2023  Appearance favoring worsening [facility] vascular congestion/interstitial edema. Physical Examination:        General appearance:  alert, cooperative and no distress. Saturating well on 3 L oxygen via nasal cannula  Mental Status:  oriented to person, place and time and normal affect  HEENT: No significant tongue or throat throat edema. Lungs:  Normal effort. Normal bilateral air entry. Heart:  regular rate and rhythm, no murmur  Abdomen:  soft, nontender, nondistended, bowel sounds are present.   Extremities: 1-2+ pitting bilateral lower extremity edema, no redness, no tenderness in the calves  Skin:  no gross lesions, rashes, induration    Assessment:        Hospital Problems             Last Modified POA    * (Principal) Acute respiratory distress 1/25/2023 Yes    Type 2 diabetes mellitus with hyperglycemia, with long-term current use of insulin ([address] 1/25/2023 Yes    Hypertensive urgency 1/25/2023 Yes    Hypoxia 1/25/2023 Yes    Congestive heart failure ([address] 1/26/2023 Yes    Nephrotic range proteinuria 1/26/2023 Yes    Acute respiratory failure with hypoxia ([address] 2/1/2023 Yes    Atherosclerosis of coronary artery bypass graft of native heart without angina pectoris 1/25/2023 Yes    Acute on chronic diastolic heart failure ([address] 1/25/2023 Yes    Diabetic polyneuropathy associated with type 2 diabetes mellitus ([address] 1/25/2023 Yes    History of coronary artery bypass graft 1/25/2023 Yes    Mixed hyperlipidemia 1/25/2023 Yes    CKD (chronic kidney disease) stage 4, GFR 15-29 ml/min [facility] 1/31/2023 Yes    Hypertension, essential 2/1/2023 Yes    Morbid obesity with BMI of 40.0-44.9, adult ([facility] 1/25/2023 Yes   Plan:        Laryngeal edema. Resolved. Will avoid isosorbide dinitrate and Ranexa at this time due to suspected anaphylaxis. Acute respiratory failure with hypoxia. Secondary to #3. Currently saturating well on 3 L nasal cannula. Acute on chronic diastolic congestive heart failure-much better. .On IV Bumex 2 mg twice daily and metolazone 5 mg daily. Appreciate nephrology recommendations. Elevated troponin likely from worsening renal function. Appreciate cardiology recommendations. Cardiology signed off    Hypertension. Continue amlodipine 10 mg daily, Coreg 12.5 mg twice daily     Stage IV CKD. Creatinine little worse than yesterday. Continued on IV Bumex 2 mg twice daily add metolazone 5 mg daily. Nata Collazo Appreciate nephrology recommendations. Type 2 diabetes mellitus. Adjusted Lantus to 20 units BID. High-dose corrective algorithm. POCT glucose and hypoglycemia protocol. Hyperlipidemia. Continue Lipitor 40 mg nightly. CAD status post CABG. last cath 2019 showed all patent grafts. Continue aspirin 81 mg daily, Lipitor 40 mg nightly, Coreg 12.5 mg twice daily. Morbid obesity. Encourage lifestyle modification with diet and exercise. DVT prophylaxis: Heparin 5000 units subcutaneously every 8 hours. GI prophylaxis: Protonix 40 mg daily. Discharge planning: Plan is discharged to [facility] once medically stable.      Mc Grimm MD  2/2/2023  8:34 AM

## 2023-02-02 NOTE — PLAN OF CARE
Problem: Discharge Planning  Goal: Discharge to home or other facility with appropriate resources  2/2/2023 0003 by Margi Brown RN  Outcome: Progressing     Problem: Safety - Adult  Goal: Free from fall injury  2/1/2023 1809 by Radha Golden RN  Outcome: Progressing     Problem: Respiratory - Adult  Goal: Achieves optimal ventilation and oxygenation  2/2/2023 0003 by Margi Brown RN  Outcome: Progressing

## 2023-02-02 NOTE — CARE COORDINATION
Met with patient to discuss transitional planning. Plan is [address] [employer] [place] is pending. Patient agreeable to plan.

## 2023-02-02 NOTE — PROGRESS NOTES
Physical Therapy  Facility/Department: [address] STEPDOWN  Physical Therapy Daily Treatment Note    Name: Keyonna Fontaine  : 1960  MRN: [id number]  Date of Service: 2023    Discharge Recommendations:  Patient would benefit from continued therapy after discharge   PT Equipment Recommendations  Equipment Needed: No      Patient Diagnosis(es): The primary encounter diagnosis was Congestive heart failure, unspecified HF chronicity, unspecified heart failure type ([address] A diagnosis of Hypoxia was also pertinent to this visit. Past Medical History:  has a past medical history of Arthritis, Backache, unspecified, Cerebral artery occlusion with cerebral infarction ([address] CHF (congestive heart failure) ([address] Chronic kidney disease, Coronary atherosclerosis of artery bypass graft, COVID, Cramp of limb, Gallstones, Hemodialysis patient ([address] Hyperlipidemia, Hypertension, Insomnia, Neuromuscular disorder ([address] Pneumonia, Psychiatric problem, Thyroid disease, Type II or unspecified type diabetes mellitus with renal manifestations, not stated as uncontrolled(250.40), Type II or unspecified type diabetes mellitus without mention of complication, not stated as uncontrolled, and Unspecified vitamin D deficiency. Past Surgical History:  has a past surgical history that includes Coronary artery bypass graft; Knee arthroscopy; Carpal tunnel release; Breast surgery; Tonsillectomy; Hand surgery; Ankle fracture surgery; Cholecystectomy, open (N/A); IR TUNNELED CVC PLACE WO SQ PORT/PUMP > 5 YEARS (2021); AV fistula creation (2021); Dialysis fistula creation (Left, 2021); other surgical history (2022); back surgery; Colonoscopy; eye surgery; fracture surgery; and Cardiac surgery. Assessment   Body Structures, Functions, Activity Limitations Requiring Skilled Therapeutic Intervention: Decreased functional mobility ; Decreased strength;Decreased endurance;Decreased balance  Assessment: Pt ambulated 60 ft with RW CGA on 3L NC. Pt most limited by decreased endurance and mild balance deficits. Pt would benefit from continued PT to address deficits, at this time pt would require 24hr assistance for mobility. Therapy Prognosis: Good  Requires PT Follow-Up: Yes  Activity Tolerance  Activity Tolerance: Patient limited by endurance     Plan   Physcial Therapy Plan  General Plan: 5-7 times per week  Current Treatment Recommendations: Strengthening, Balance training, Transfer training, Functional mobility training, Home exercise program, Equipment evaluation, education, & procurement, Patient/Caregiver education & training, Safety education & training, Therapeutic activities, Endurance training, Gait training  Safety Devices  Type of Devices: Gait belt, Call light within reach, Nurse notified, All fall risk precautions in place, Left in chair  Restraints  Restraints Initially in Place: No     Restrictions  Restrictions/Precautions  Restrictions/Precautions: Fall Risk  Required Braces or Orthoses?: No  Position Activity Restriction  Other position/activity restrictions: Up with Assist.  Monitor SpO2. Subjective   Pain: pt denies pain  General  Chart Reviewed: Yes  Patient assessed for rehabilitation services?: Yes  Response To Previous Treatment: Patient with no complaints from previous session. Family / Caregiver Present: No  Follows Commands: Within Functional Limits  General Comment  Comments: Pt left seated in recliner with call light within reach  Subjective  Subjective: Pt and RN agreeable to PT. pt seated in recliner upon arrival, very pleasant and cooperative with treatment. Pt denies pain at rest, states \"I feel like I'm in a fog\"       Cognition   Orientation  Overall Orientation Status: Within Functional Limits  Orientation Level: Oriented X4  Cognition  Overall Cognitive Status: WFL  Arousal/Alertness: Appropriate responses to stimuli  Following Commands:  Follows multistep commands with increased time  Attention Span: Attends with cues to redirect  Memory: Decreased long term memory  Safety Judgement: Decreased awareness of need for assistance;Decreased awareness of need for safety  Problem Solving: Assistance required to generate solutions;Assistance required to identify errors made  Insights: Fully aware of deficits  Initiation: Requires cues for some  Sequencing: Requires cues for some     Objective   Heart Rate: 55  Heart Rate Source: Monitor  SpO2: 97 %  O2 Device: Nasal cannula (3L NC)     Observation/Palpation  Posture: Good    Bed mobility  Bed Mobility Comments: pt began and concluded session in recliner  Transfers  Sit to Stand: Contact guard assistance  Stand to Sit: Stand by assistance  Comment: Assessed to RW with good demonstration of UE placement  Ambulation  Surface: Level tile  Device: Rolling Walker  Other Apparatus: O2 (3L)  Assistance: Contact guard assistance  Quality of Gait: fair stability, decreased stride length, no LOB  Gait Deviations: Slow Annie;Decreased step length;Decreased step height  Distance: 30ft x2  Comments: brief standing rest break required between ambulations  More Ambulation?: No  Stairs/Curb  Stairs?: No     Balance  Posture: Good  Sitting - Static: Good  Sitting - Dynamic: Good  Standing - Static: Fair;+  Standing - Dynamic: Fair  Comments: standing balance assessed while using a RW  Exercise Treatment:   Seated LE exercise program: Long Arc Quads, hip abduction/adduction, heel/toe raises, and marches. Reps: 20x     AM-PAC Score  AM-PAC Inpatient Mobility Raw Score : 16 (02/02/23 1604)  AM-PAC Inpatient T-Scale Score : 40.78 (02/02/23 1604)  Mobility Inpatient CMS 0-100% Score: 54.16 (02/02/23 1604)  Mobility Inpatient CMS G-Code Modifier : CK (02/02/23 1604)    Goals  Short Term Goals  Time Frame for Short Term Goals: 14 visits  Short Term Goal 1: Pt will ambulate 150 feet with a RW and supervision to increase functional independence.   Short Term Goal 2: Pt will demonstrate good- dynamic standing balance to decrease fall risk. Short Term Goal 3: Pt will perform sit<>stand transfer independently. Short Term Goal 4: Pt will tolerate a 35 minute therapy session to promote increased endurance. Short Term Goal 5: Pt will perform supine<>sit transfer with supervision  Additional Goals?: No       Education  Patient Education  Education Given To: Patient  Education Provided: Role of Therapy;Transfer Training;Plan of Care; Fall Prevention Strategies  Education Method: Verbal  Barriers to Learning: None  Education Outcome: Verbalized understanding;Demonstrated understanding      Therapy Time   Individual Concurrent Group Co-treatment   Time In 1430         Time Out 1454         Minutes 24         Timed Code Treatment Minutes: 25 Minutes       [place], [place]

## 2023-02-02 NOTE — PLAN OF CARE
Problem: Discharge Planning  Goal: Discharge to home or other facility with appropriate resources  Outcome: Progressing  Flowsheets (Taken 2/2/2023 0830)  Discharge to home or other facility with appropriate resources: Identify barriers to discharge with patient and caregiver     Problem: Pain  Goal: Verbalizes/displays adequate comfort level or baseline comfort level  Outcome: Progressing  Flowsheets (Taken 2/2/2023 1626)  Verbalizes/displays adequate comfort level or baseline comfort level: Encourage patient to monitor pain and request assistance     Problem: Safety - Adult  Goal: Free from fall injury  Outcome: Progressing     Problem: Chronic Conditions and Co-morbidities  Goal: Patient's chronic conditions and co-morbidity symptoms are monitored and maintained or improved  Outcome: Progressing  Flowsheets (Taken 2/2/2023 0830)  Care Plan - Patient's Chronic Conditions and Co-Morbidity Symptoms are Monitored and Maintained or Improved: Monitor and assess patient's chronic conditions and comorbid symptoms for stability, deterioration, or improvement     Problem: ABCDS Injury Assessment  Goal: Absence of physical injury  Outcome: Progressing     Problem: Respiratory - Adult  Goal: Achieves optimal ventilation and oxygenation  Outcome: Progressing  Flowsheets (Taken 2/2/2023 0830)  Achieves optimal ventilation and oxygenation: Assess for changes in respiratory status     Problem: Skin/Tissue Integrity  Goal: Absence of new skin breakdown  Description: 1. Monitor for areas of redness and/or skin breakdown  2. Assess vascular access sites hourly  3. Every 4-6 hours minimum:  Change oxygen saturation probe site  4. Every 4-6 hours:  If on nasal continuous positive airway pressure, respiratory therapy assess nares and determine need for appliance change or resting period.   Outcome: Progressing     Problem: Nutrition Deficit:  Goal: Optimize nutritional status  Outcome: Progressing

## 2023-02-03 LAB
ABSOLUTE EOS #: 0.24 K/UL (ref 0–0.44)
ABSOLUTE IMMATURE GRANULOCYTE: <0.03 K/UL (ref 0–0.3)
ABSOLUTE LYMPH #: 0.99 K/UL (ref 1.1–3.7)
ABSOLUTE MONO #: 0.52 K/UL (ref 0.1–1.2)
ANION GAP SERPL CALCULATED.3IONS-SCNC: 10 MMOL/L (ref 9–17)
BASOPHILS # BLD: 1 % (ref 0–2)
BASOPHILS ABSOLUTE: 0.05 K/UL (ref 0–0.2)
BUN SERPL-MCNC: 67 MG/DL (ref 8–23)
CALCIUM SERPL-MCNC: 8.9 MG/DL (ref 8.6–10.4)
CHLORIDE SERPL-SCNC: 99 MMOL/L (ref 98–107)
CO2 SERPL-SCNC: 29 MMOL/L (ref 20–31)
CREAT SERPL-MCNC: 2.92 MG/DL (ref 0.5–0.9)
EOSINOPHILS RELATIVE PERCENT: 4 % (ref 1–4)
GFR SERPL CREATININE-BSD FRML MDRD: 17 ML/MIN/1.73M2
GLUCOSE BLD-MCNC: 164 MG/DL (ref 65–105)
GLUCOSE BLD-MCNC: 339 MG/DL (ref 65–105)
GLUCOSE BLD-MCNC: 353 MG/DL (ref 65–105)
GLUCOSE BLD-MCNC: 366 MG/DL (ref 65–105)
GLUCOSE SERPL-MCNC: 233 MG/DL (ref 70–99)
HCT VFR BLD AUTO: 34.4 % (ref 36.3–47.1)
HGB BLD-MCNC: 11.1 G/DL (ref 11.9–15.1)
IMMATURE GRANULOCYTES: 0 %
LYMPHOCYTES # BLD: 17 % (ref 24–43)
MCH RBC QN AUTO: 31.6 PG (ref 25.2–33.5)
MCHC RBC AUTO-ENTMCNC: 32.3 G/DL (ref 28.4–34.8)
MCV RBC AUTO: 98 FL (ref 82.6–102.9)
MONOCYTES # BLD: 9 % (ref 3–12)
NRBC AUTOMATED: 0 PER 100 WBC
PDW BLD-RTO: 14.3 % (ref 11.8–14.4)
PLATELET # BLD AUTO: 144 K/UL (ref 138–453)
PMV BLD AUTO: 12 FL (ref 8.1–13.5)
POTASSIUM SERPL-SCNC: 4.6 MMOL/L (ref 3.7–5.3)
RBC # BLD: 3.51 M/UL (ref 3.95–5.11)
SEG NEUTROPHILS: 69 % (ref 36–65)
SEGMENTED NEUTROPHILS ABSOLUTE COUNT: 4.08 K/UL (ref 1.5–8.1)
SODIUM SERPL-SCNC: 138 MMOL/L (ref 135–144)
WBC # BLD AUTO: 5.9 K/UL (ref 3.5–11.3)

## 2023-02-03 PROCEDURE — 36415 COLL VENOUS BLD VENIPUNCTURE: CPT

## 2023-02-03 PROCEDURE — 85025 COMPLETE CBC W/AUTO DIFF WBC: CPT

## 2023-02-03 PROCEDURE — 94761 N-INVAS EAR/PLS OXIMETRY MLT: CPT

## 2023-02-03 PROCEDURE — 2500000003 HC RX 250 WO HCPCS: Performed by: INTERNAL MEDICINE

## 2023-02-03 PROCEDURE — 6370000000 HC RX 637 (ALT 250 FOR IP): Performed by: STUDENT IN AN ORGANIZED HEALTH CARE EDUCATION/TRAINING PROGRAM

## 2023-02-03 PROCEDURE — 6370000000 HC RX 637 (ALT 250 FOR IP)

## 2023-02-03 PROCEDURE — 2500000003 HC RX 250 WO HCPCS: Performed by: NURSE PRACTITIONER

## 2023-02-03 PROCEDURE — 99232 SBSQ HOSP IP/OBS MODERATE 35: CPT | Performed by: STUDENT IN AN ORGANIZED HEALTH CARE EDUCATION/TRAINING PROGRAM

## 2023-02-03 PROCEDURE — 99232 SBSQ HOSP IP/OBS MODERATE 35: CPT | Performed by: INTERNAL MEDICINE

## 2023-02-03 PROCEDURE — 2700000000 HC OXYGEN THERAPY PER DAY

## 2023-02-03 PROCEDURE — 6370000000 HC RX 637 (ALT 250 FOR IP): Performed by: NURSE PRACTITIONER

## 2023-02-03 PROCEDURE — 97110 THERAPEUTIC EXERCISES: CPT

## 2023-02-03 PROCEDURE — 6370000000 HC RX 637 (ALT 250 FOR IP): Performed by: CLINICAL NURSE SPECIALIST

## 2023-02-03 PROCEDURE — 2060000000 HC ICU INTERMEDIATE R&B

## 2023-02-03 PROCEDURE — 82947 ASSAY GLUCOSE BLOOD QUANT: CPT

## 2023-02-03 PROCEDURE — 2580000003 HC RX 258: Performed by: STUDENT IN AN ORGANIZED HEALTH CARE EDUCATION/TRAINING PROGRAM

## 2023-02-03 PROCEDURE — 80048 BASIC METABOLIC PNL TOTAL CA: CPT

## 2023-02-03 PROCEDURE — 6360000002 HC RX W HCPCS: Performed by: STUDENT IN AN ORGANIZED HEALTH CARE EDUCATION/TRAINING PROGRAM

## 2023-02-03 PROCEDURE — 94640 AIRWAY INHALATION TREATMENT: CPT

## 2023-02-03 RX ORDER — INSULIN GLARGINE 100 [IU]/ML
30 INJECTION, SOLUTION SUBCUTANEOUS 2 TIMES DAILY
Status: DISCONTINUED | OUTPATIENT
Start: 2023-02-03 | End: 2023-02-04

## 2023-02-03 RX ORDER — GUAIFENESIN 200 MG/10ML
200 LIQUID ORAL EVERY 4 HOURS PRN
Status: DISCONTINUED | OUTPATIENT
Start: 2023-02-03 | End: 2023-02-06 | Stop reason: HOSPADM

## 2023-02-03 RX ADMIN — SODIUM CHLORIDE, PRESERVATIVE FREE 10 ML: 5 INJECTION INTRAVENOUS at 08:22

## 2023-02-03 RX ADMIN — INSULIN LISPRO 12 UNITS: 100 INJECTION, SOLUTION INTRAVENOUS; SUBCUTANEOUS at 12:28

## 2023-02-03 RX ADMIN — PANTOPRAZOLE SODIUM 40 MG: 40 TABLET, DELAYED RELEASE ORAL at 06:25

## 2023-02-03 RX ADMIN — BUMETANIDE 2 MG: 0.25 INJECTION INTRAMUSCULAR; INTRAVENOUS at 08:21

## 2023-02-03 RX ADMIN — HEPARIN SODIUM 5000 UNITS: 5000 INJECTION INTRAVENOUS; SUBCUTANEOUS at 00:24

## 2023-02-03 RX ADMIN — DOCUSATE SODIUM 100 MG: 100 CAPSULE ORAL at 08:20

## 2023-02-03 RX ADMIN — INSULIN LISPRO 4 UNITS: 100 INJECTION, SOLUTION INTRAVENOUS; SUBCUTANEOUS at 20:25

## 2023-02-03 RX ADMIN — HEPARIN SODIUM 5000 UNITS: 5000 INJECTION INTRAVENOUS; SUBCUTANEOUS at 08:23

## 2023-02-03 RX ADMIN — ATORVASTATIN CALCIUM 40 MG: 80 TABLET, FILM COATED ORAL at 20:24

## 2023-02-03 RX ADMIN — Medication 10 MG: at 22:21

## 2023-02-03 RX ADMIN — DOCUSATE SODIUM 100 MG: 100 CAPSULE ORAL at 20:24

## 2023-02-03 RX ADMIN — ASPIRIN 81 MG: 81 TABLET, CHEWABLE ORAL at 08:20

## 2023-02-03 RX ADMIN — GUAIFENESIN 200 MG: 200 SOLUTION ORAL at 22:23

## 2023-02-03 RX ADMIN — IPRATROPIUM BROMIDE AND ALBUTEROL SULFATE 1 AMPULE: 2.5; .5 SOLUTION RESPIRATORY (INHALATION) at 12:14

## 2023-02-03 RX ADMIN — CARVEDILOL 12.5 MG: 12.5 TABLET, FILM COATED ORAL at 08:20

## 2023-02-03 RX ADMIN — GABAPENTIN 300 MG: 300 CAPSULE ORAL at 20:24

## 2023-02-03 RX ADMIN — INSULIN GLARGINE 20 UNITS: 100 INJECTION, SOLUTION SUBCUTANEOUS at 08:23

## 2023-02-03 RX ADMIN — AMLODIPINE BESYLATE 10 MG: 10 TABLET ORAL at 08:20

## 2023-02-03 RX ADMIN — TAMSULOSIN HYDROCHLORIDE 0.4 MG: 0.4 CAPSULE ORAL at 08:21

## 2023-02-03 RX ADMIN — IPRATROPIUM BROMIDE AND ALBUTEROL SULFATE 1 AMPULE: 2.5; .5 SOLUTION RESPIRATORY (INHALATION) at 16:19

## 2023-02-03 RX ADMIN — INSULIN LISPRO 16 UNITS: 100 INJECTION, SOLUTION INTRAVENOUS; SUBCUTANEOUS at 17:09

## 2023-02-03 RX ADMIN — SODIUM BICARBONATE 1300 MG: 648 TABLET ORAL at 08:21

## 2023-02-03 RX ADMIN — IPRATROPIUM BROMIDE AND ALBUTEROL SULFATE 1 AMPULE: 2.5; .5 SOLUTION RESPIRATORY (INHALATION) at 19:49

## 2023-02-03 RX ADMIN — METOLAZONE 5 MG: 5 TABLET ORAL at 08:21

## 2023-02-03 RX ADMIN — BUMETANIDE 2 MG: 0.25 INJECTION INTRAMUSCULAR; INTRAVENOUS at 20:25

## 2023-02-03 RX ADMIN — INSULIN GLARGINE 30 UNITS: 100 INJECTION, SOLUTION SUBCUTANEOUS at 20:25

## 2023-02-03 RX ADMIN — SODIUM CHLORIDE, PRESERVATIVE FREE 10 ML: 5 INJECTION INTRAVENOUS at 20:25

## 2023-02-03 RX ADMIN — HEPARIN SODIUM 5000 UNITS: 5000 INJECTION INTRAVENOUS; SUBCUTANEOUS at 17:09

## 2023-02-03 RX ADMIN — CARVEDILOL 12.5 MG: 12.5 TABLET, FILM COATED ORAL at 20:24

## 2023-02-03 RX ADMIN — SODIUM BICARBONATE 1300 MG: 648 TABLET ORAL at 20:24

## 2023-02-03 NOTE — PROGRESS NOTES
Physical Therapy  Facility/Department: [address] STEPDOWN  Physical Therapy Daily Treatment Note    Name: Maria R Timmons  : 1960  MRN: [id number]  Date of Service: 2/3/2023    Discharge Recommendations:  Patient would benefit from continued therapy after discharge   PT Equipment Recommendations  Equipment Needed: No      Patient Diagnosis(es): The primary encounter diagnosis was Congestive heart failure, unspecified HF chronicity, unspecified heart failure type ([address] A diagnosis of Hypoxia was also pertinent to this visit. Past Medical History:  has a past medical history of Arthritis, Backache, unspecified, Cerebral artery occlusion with cerebral infarction ([address] CHF (congestive heart failure) ([address] Chronic kidney disease, Coronary atherosclerosis of artery bypass graft, COVID, Cramp of limb, Gallstones, Hemodialysis patient ([address] Hyperlipidemia, Hypertension, Insomnia, Neuromuscular disorder ([address] Pneumonia, Psychiatric problem, Thyroid disease, Type II or unspecified type diabetes mellitus with renal manifestations, not stated as uncontrolled(250.40), Type II or unspecified type diabetes mellitus without mention of complication, not stated as uncontrolled, and Unspecified vitamin D deficiency. Past Surgical History:  has a past surgical history that includes Coronary artery bypass graft; Knee arthroscopy; Carpal tunnel release; Breast surgery; Tonsillectomy; Hand surgery; Ankle fracture surgery; Cholecystectomy, open (N/A); IR TUNNELED CVC PLACE WO SQ PORT/PUMP > 5 YEARS (2021); AV fistula creation (2021); Dialysis fistula creation (Left, 2021); other surgical history (2022); back surgery; Colonoscopy; eye surgery; fracture surgery; and Cardiac surgery. Assessment   Body Structures, Functions, Activity Limitations Requiring Skilled Therapeutic Intervention: Decreased functional mobility ; Decreased strength;Decreased endurance;Decreased balance  Assessment: Pt ambulated 60 ft with RW CGA on 3L NC. Pt most limited by decreased endurance and mild balance deficits. Pt would benefit from continued PT to address deficits, at this time pt would require 24hr assistance for mobility. Therapy Prognosis: Good  Requires PT Follow-Up: Yes  Activity Tolerance  Activity Tolerance: Patient limited by endurance; Patient tolerated treatment well     Plan   Physcial Therapy Plan  General Plan: 5-7 times per week  Current Treatment Recommendations: Strengthening, Balance training, Transfer training, Functional mobility training, Home exercise program, Equipment evaluation, education, & procurement, Patient/Caregiver education & training, Safety education & training, Therapeutic activities, Endurance training, Gait training  Safety Devices  Type of Devices: Gait belt, Call light within reach, Nurse notified, All fall risk precautions in place, Left in chair  Restraints  Restraints Initially in Place: No     Restrictions  Restrictions/Precautions  Restrictions/Precautions: Fall Risk  Required Braces or Orthoses?: No  Position Activity Restriction  Other position/activity restrictions: Up with Assist.  Monitor SpO2. Subjective   Pain: pt denies pain  General  Chart Reviewed: Yes  Patient assessed for rehabilitation services?: Yes  Response To Previous Treatment: Patient with no complaints from previous session. Family / Caregiver Present: No  Follows Commands: Within Functional Limits  General Comment  Comments: Pt left seated in recliner with call light within reach  Subjective  Subjective: Pt and RN agreeable to PT. pt resting in bed upon arrival, very pleasant and cooperative with treatment. Pt denies pain at rest       Cognition   Orientation  Overall Orientation Status: Within Functional Limits  Orientation Level: Oriented X4  Cognition  Overall Cognitive Status: WFL  Arousal/Alertness: Appropriate responses to stimuli  Following Commands:  Follows multistep commands with increased time  Attention Span: Appears intact  Memory: Decreased long term memory  Safety Judgement: Decreased awareness of need for assistance  Problem Solving: Assistance required to generate solutions;Assistance required to identify errors made  Insights: Fully aware of deficits  Initiation: Requires cues for some  Sequencing: Requires cues for some     Objective   Heart Rate: 68  Heart Rate Source: Monitor  SpO2: 96 %  O2 Device: Nasal cannula (3L)     Observation/Palpation  Posture: Good     Bed mobility  Supine to Sit: Minimal assistance  Sit to Supine:  (pt left seated in recliner)  Scooting: Contact guard assistance  Bed Mobility Comments: HOB elevated, increased time to complete  Transfers  Sit to Stand: Minimal Assistance  Stand to Sit: Contact guard assistance  Comment: Assessed to  with good demonstration of UE placement  Ambulation  Surface: Level tile  Device: Rolling Walker  Other Apparatus: O2  Assistance: Contact guard assistance  Quality of Gait: fair stability, decreased stride length, no LOB  Gait Deviations: Slow Annie;Decreased step length;Decreased step height  Distance: 30ft x2  Comments: brief standing rest break required between ambulations, noted B LE weakness with increased distance  More Ambulation?: No  Stairs/Curb  Stairs?: No     Balance  Posture: Good  Sitting - Static: Good  Sitting - Dynamic: Good  Standing - Static: Fair;+  Standing - Dynamic: Fair  Comments: standing balance assessed while using a RW  Exercise Treatment:   Seated LE exercise program: Long Arc Quads, hip abduction/adduction, heel/toe raises, and marches.  Reps: 20x     AM-PAC Score  AM-PAC Inpatient Mobility Raw Score : 16 (02/02/23 1604)  AM-PAC Inpatient T-Scale Score : 40.78 (02/02/23 1604)  Mobility Inpatient CMS 0-100% Score: 54.16 (02/02/23 1604)  Mobility Inpatient CMS G-Code Modifier : CK (02/02/23 1604)     Goals  Short Term Goals  Time Frame for Short Term Goals: 14 visits  Short Term Goal 1: Pt will ambulate 150 feet with a RW and supervision to increase functional independence. Short Term Goal 2: Pt will demonstrate good- dynamic standing balance to decrease fall risk. Short Term Goal 3: Pt will perform sit<>stand transfer independently. Short Term Goal 4: Pt will tolerate a 35 minute therapy session to promote increased endurance. Short Term Goal 5: Pt will perform supine<>sit transfer with supervision  Additional Goals?: No       Education  Patient Education  Education Given To: Patient  Education Provided: Role of Therapy;Transfer Training;Plan of Care; Fall Prevention Strategies  Education Method: Demonstration;Verbal  Barriers to Learning: None  Education Outcome: Verbalized understanding;Demonstrated understanding      Therapy Time   Individual Concurrent Group Co-treatment   Time In 1506         Time Out 1530         Minutes 24         Timed Code Treatment Minutes: 25 Minutes       [place]izet [place], [place]

## 2023-02-03 NOTE — PROGRESS NOTES
Nephrology Progress Note      SUBJECTIVE      Patient seen and examined bedside  Denies any new symptoms  Comfortably lying on the bed  Reports feeling little better when compared to yesterday    Patient is currently on Bumex 2 mg IV twice daily and metolazone 5mg daily for now. Has been afebrile, heart rate and blood pressure okay, on 4 L nasal cannula  Labs this morning showed sodium 138, potassium 4.6, CO2 29, creatinine 2.9, WBC 5.9, hemoglobin 11.1, platelets 762  Urine output documented as about 3 L in the last 24 hours. HPI -     This is a 57 y.o. female who presented to the hospital for evaluation of increasing edema, increasing weight gain of about 8 to 9 pounds, and a progressive exertional dyspnea. The symptoms started about 8 or 9 days ago, she did seek medical attention the PCP who ordered a chest x-ray and was concerned about some congestive heart failure. Patient was contacted and advised to come to the hospital.     She is an office patient of Dr. Claribel Mcgarry. Previously she has had acute on chronic renal injury and ended up on dialysis for few months secondary likely to contrast associated renal injury and as of August 2022 she has been off dialysis. Her serum creatinine seems to be hovering around 2.2 -2.4 range. At home she typically takes furosemide twice daily (120 mg twice a day). She tells me she has been very compliant in regards to her that medication use. She does not give any history of recent change in her diet, increasing salt intake, noncompliance to medications, does watch her fluids at home as well. She is going having some exertional dyspnea along with. She denies any palpitations or any episodes of syncope. She did not give any history of orthopnea. She denies any NSAID use. Patient has had a longstanding history of diabetes mellitus type 2. Her CKD was originally thought to be related to diabetic renal disease.   She does also have history of essential hypertension, dyslipidemia, diastolic CHF. She was last evaluated in the office approximately 4 to 5 weeks ago at which time her physical exam and her labs were actually relatively stable. Admission chest x-ray showed evidence of vascular congestion  Last cardiac echo from 2022 showed evidence of mild diastolic dysfunction, LV function preserved at greater than 55%, moderate left ventricular hypertrophy. On review of most recent urine studies, she seems to have developed worsening proteinuria. Her proteinuria last year was quantified somewhere around 2.5 g and most recent studies indicate proteinuria to be almost at 5 g. She denies any history of diabetic retinopathy but does admit to symptoms of neuropathy. Pt denies any hx of heavy or prolonged NSAID use. There is no history of blood or bone marrow disorders. There is no hx of jaundice or hepatitis or sexually transmitted disease. Pt has no hx of collagen vascular disease or vasculitis. There is no hx of paraprotein disease. No hx of recurrent UTI , incontinence or recurrent nephrolithiasis.       Started on IV Lasix infusion on 2023, stop Lasix infusion on 2023, started on Bumex IV 2 mg twice daily  OBJECTIVE      CURRENT TEMPERATURE:  Temp: 97.6 °F (36.4 °C)  MAXIMUM TEMPERATURE OVER 24HRS:  Temp (24hrs), Av.2 °F (36.8 °C), Min:97.6 °F (36.4 °C), Max:98.6 °F (37 °C)    CURRENT RESPIRATORY RATE:  Resp: 14  CURRENT PULSE:  Heart Rate: 61  CURRENT BLOOD PRESSURE:  BP: (!) 130/52  24HR BLOOD PRESSURE RANGE:  Systolic (57GWJ), QNN:918 , Min:125 , ZWC:187   ; Diastolic (36URQ), ZJY:05, Min:52, Max:68    24HR INTAKE/OUTPUT:    Intake/Output Summary (Last 24 hours) at 2/3/2023 1200  Last data filed at 2/3/2023 0820  Gross per 24 hour   Intake 300 ml   Output 2910 ml   Net -2610 ml       PHYSICAL EXAM      GENERAL APPEARANCE:Awake, alert, in no acute distress  SKIN: warm and dry, no rash or erythema  EYES: conjunctivae normal and sclera anicteric  NECK: JVD: None   PULMONARY: Clear on auscultation  CADRDIOVASCULAR: Normal heart sounds  ABDOMEN: Soft and distended  EXTREMITIES: Bilateral pitting pedal edema    CURRENT MEDICATIONS      metOLazone (ZAROXOLYN) tablet 5 mg, Daily  bumetanide (BUMEX) injection 2 mg, BID  insulin glargine (LANTUS) injection vial 20 Units, BID  carvedilol (COREG) tablet 12.5 mg, BID  tamsulosin (FLOMAX) capsule 0.4 mg, Daily  insulin lispro (HUMALOG) injection vial 0-16 Units, TID WC  insulin lispro (HUMALOG) injection vial 0-4 Units, Nightly  ipratropium-albuterol (DUONEB) nebulizer solution 1 ampule, Q4H WA  benzocaine-menthol (CEPACOL SORE THROAT) lozenge 1 lozenge, Q2H PRN  gabapentin (NEURONTIN) capsule 300 mg, Nightly  melatonin tablet 3 mg, Nightly PRN  melatonin tablet 10 mg, Nightly PRN  glucose chewable tablet 16 g, PRN  dextrose bolus 10% 125 mL, PRN   Or  dextrose bolus 10% 250 mL, PRN  glucagon (rDNA) injection 1 mg, PRN  dextrose 10 % infusion, Continuous PRN  docusate sodium (COLACE) capsule 100 mg, BID  [Held by provider] ranolazine (RANEXA) extended release tablet 1,000 mg, Daily  [Held by provider] isosorbide dinitrate (ISORDIL) tablet 10 mg, TID  aspirin chewable tablet 81 mg, Daily  atorvastatin (LIPITOR) tablet 40 mg, Nightly  sodium bicarbonate tablet 1,300 mg, BID  pantoprazole (PROTONIX) tablet 40 mg, QAM AC  sodium chloride flush 0.9 % injection 5-40 mL, 2 times per day  sodium chloride flush 0.9 % injection 10 mL, PRN  0.9 % sodium chloride infusion, PRN  potassium chloride (KLOR-CON M) extended release tablet 40 mEq, PRN   Or  potassium bicarb-citric acid (EFFER-K) effervescent tablet 40 mEq, PRN   Or  potassium chloride 10 mEq/100 mL IVPB (Peripheral Line), PRN  magnesium sulfate 1000 mg in dextrose 5% 100 mL IVPB, PRN  ondansetron (ZOFRAN-ODT) disintegrating tablet 4 mg, Q8H PRN   Or  ondansetron (ZOFRAN) injection 4 mg, Q6H PRN  polyethylene glycol (GLYCOLAX) packet 17 g, Daily PRN  acetaminophen (TYLENOL) tablet 650 mg, Q6H PRN   Or  acetaminophen (TYLENOL) suppository 650 mg, Q6H PRN  heparin (porcine) injection 5,000 Units, 3 times per day  labetalol (NORMODYNE;TRANDATE) injection 10 mg, Q6H PRN  amLODIPine (NORVASC) tablet 10 mg, Daily  hydrALAZINE (APRESOLINE) injection 10 mg, Q6H PRN          LABS      CBC:   Recent Labs     02/01/23 0538 02/02/23 0642 02/03/23 0323   WBC 6.0 5.6 5.9   RBC 3.72* 3.67* 3.51*   HGB 11.7* 11.5* 11.1*   HCT 37.3 36.1* 34.4*   .3 98.4 98.0   MCH 31.5 31.3 31.6   MCHC 31.4 31.9 32.3   RDW 14.9* 14.6* 14.3    134* 144   MPV 12.2 12.0 12.0      BMP:   Recent Labs     02/01/23 0538 02/02/23 0642 02/03/23 0323    140 138   K 4.2 4.9 4.6    101 99   CO2 25 28 29   BUN 65* 69* 67*   CREATININE 2.99* 3.05* 2.92*   GLUCOSE 73 163* 233*   CALCIUM 8.8 8.9 8.9      BNP:  Lab Results   Component Value Date/Time    BNP 41 04/09/2015 12:00 AM     PHOSPHORUS:  No results for input(s): PHOS in the last 72 hours. MAGNESIUM: No results for input(s): MG in the last 72 hours. ALBUMIN: No results for input(s): LABALBU in the last 72 hours.   IRON:    Lab Results   Component Value Date/Time    IRON 83 09/27/2022 01:32 PM     IRON SATURATION:    Lab Results   Component Value Date/Time    LABIRON 38 09/27/2022 01:32 PM     TIBC:    Lab Results   Component Value Date/Time    TIBC 217 09/27/2022 01:32 PM     FERRITIN:    Lab Results   Component Value Date/Time    FERRITIN 117 09/27/2022 01:32 PM     RUSSELL:   Lab Results   Component Value Date    RUSSELL NEGATIVE 08/03/2022       SPEP:   Lab Results   Component Value Date/Time    PROT 7.0 12/01/2022 10:25 AM     UPEP:   Lab Results   Component Value Date/Time    TPU 20 11/12/2021 10:30 AM      HEPBSAG:  Lab Results   Component Value Date/Time    HEPBSAG NONREACTIVE 03/30/2022 03:30 PM     HEPCAB:  Lab Results   Component Value Date/Time    HEPCAB NONREACTIVE 03/30/2022 03:30 PM     URINE SODIUM:    Lab Results   Component Value Date/Time    JASON 47 11/14/2021 02:04 AM     URINE CREATININE:    Lab Results   Component Value Date/Time    LABCREA 65.4 01/26/2023 01:10 PM     URINE PROTEIN:    Lab Results   Component Value Date/Time    TPU 20 11/12/2021 10:30 AM     URINALYSIS:  U/A:   Lab Results   Component Value Date/Time    NITRU NEGATIVE 01/10/2023 02:14 PM    COLORU Yellow 01/10/2023 02:14 PM    PHUR 7.0 01/10/2023 02:14 PM    WBCUA 2 TO 5 01/10/2023 02:14 PM    RBCUA 0 TO 2 01/10/2023 02:14 PM    MUCUS 2+ 07/31/2022 12:00 PM    TRICHOMONAS NOT REPORTED 11/14/2021 02:05 AM    YEAST FEW 07/02/2022 09:10 AM    BACTERIA FEW 01/10/2023 02:14 PM    SPECGRAV 1.021 01/10/2023 02:14 PM    LEUKOCYTESUR SMALL 01/10/2023 02:14 PM    UROBILINOGEN Normal 01/10/2023 02:14 PM    BILIRUBINUR NEGATIVE 01/10/2023 02:14 PM    GLUCOSEU NEGATIVE 01/10/2023 02:14 PM    KETUA NEGATIVE 01/10/2023 02:14 PM    AMORPHOUS 2+ 07/31/2022 12:00 PM     RADIOLOGY      Reviewed as available. ASSESSMENT      1. CKD 4 likely due to underlying diabetic renal involvement. Was on dialysis for several months, got off of dialysis about 5-1/2 months ago and later taken off of HD. Her creatinine currently seems to be running around high 2's to low 3's.    2.  Acute on chronic diastolic CHF  3. Hypertensive urgency  3. Acute on chronic hypoxic respiratory failure  4. Diabetes mellitus type 2  5. CAD s/p CABG, last cath in October 2019  6. Essential hypertension  7. Obesity  8. History of DVT    PLAN      1. Continue diuresis with IV Bumex and metolazone for now. 2.  Strict I's and O's, daily weights,  3. Continue to hold on starting ARB for now  4. BMP in the a.m.  5.  Will follow    Martin Farley  PGY-2  Internal Medicine  [address] [facility], [address] [place] [address]    12:01 [place] 2/3/2023     Attending Physician Statement  I have discussed the care of this patient, including pertinent history and exam findings, with the Resident/CNP.  I have reviewed and edited the key elements of all parts of the encounter with the Resident/CNP. I agree with the assessment, plan and orders as documented by the Resident/CNP. Charlie Kay MD   Nephrology [address] [place] [address] Drive    This note is created with the assistance of a speech-recognition program. While intending to generate a document that actually reflects the content of the visit, no guarantees can be provided that every mistake has been identified and corrected by editing.

## 2023-02-03 NOTE — PLAN OF CARE
Problem: Chronic Conditions and Co-morbidities  Goal: Patient's chronic conditions and co-morbidity symptoms are monitored and maintained or improved  2/3/2023 0415 by Darnell Goldstein RN  Outcome: Progressing     Problem: Respiratory - Adult  Goal: Achieves optimal ventilation and oxygenation  2/3/2023 0415 by Darnell Goldstein RN  Outcome: Progressing

## 2023-02-03 NOTE — CARE COORDINATION
Transitional Planning:  Call received from Ama[facility] at [facility]. Yonatan Mullen. They have auth and can receive before 4 p today or tomorrow am.   Per Yady Mann they will need order for bipap prior to transport. Report # [phone] ask for [facility]  Fax:  [phone] with nurse Bing Jiang. Pt is not discharged and ready. Call to Yady Mann and informed that pt is not ready for discharge. Per Carmen Templeton is good through weekend. If pt does not discharge over weekend she will need to restart precert.

## 2023-02-03 NOTE — PROGRESS NOTES
[facility]  Office: [address], DO, [place], DO, Estevan Rosen, DO, Teediana Jones Blood, DO, Juliana Ramirez MD, Guilherme Peterson MD, Raymon Sheriff MD, Boyd Gomez MD,  Frantz Elias MD, Constanza Vieyra MD, Hector Pena, DO, Leander Pickard MD,  Raffi Marsh MD, Vielka Lyon MD, Danne Lennox, DO, Radha Sparks MD, Abena Muhammad MD, Brennan Harris DO, Vasile Ramires MD, Sai Champion MD, Raine Dempsey MD, Meliton Bruno MD, Steph Grissom DO, Sienna Gray MD, Agustín Burks MD, Leilani Nowak, Chaya Atkins, CNP, Governor Eli, CNP, Kylee Platt, CNP,  Paola Benson, [facility], Librado Napoles, CNP, Jesus Moser, CNP, Shorty Boucher, CNP, Kaitlynn Du, CNP, Stephen Shetty, CNP, Primo Loyd PAArsenioC, Ac Carrion, CNS, Alex Yanes, CNP, Buddy Garcia, [facility]         Enrike Rodgers 19    Progress Note    2/3/2023    8:04 AM    Name:   Francisco Walls  MRN:     [id number]     Acct:      [de-identified]   Room:   [address] [place] [address] Day:  9  Admit Date:  1/25/2023 10:13 AM    PCP:   Prudence Sandifer, APRN - CNP  Code Status:  Full Code    Subjective:     C/C:   Chief Complaint   Patient presents with    Shortness of Breath    Weight Gain     Interval History Status: improved. Patient is alert oriented x3. Vital signs stable. Saturating well on 3 L oxygen. Breathing much better today per patient  Creatinine improving. Net-13l since admission  No other current complaints. Brief History:     Francisco Walls is a 57 y.o. Non- / non  female who presents with Shortness of Breath and Weight Gain   and is admitted to the hospital for the management of Acute respiratory distress.      57year-old female with known medical history of diastolic heart failure, CKD, hypertension, hyperlipidemia, type 2 diabetes mellitus, coronary artery disease with history of CABG presents to the hospital with worsening shortness of breath and leg swelling for last 1 month. Patient states that she was on dialysis after she got contrast for a CT PE scan and was recently taken off dialysis in August 2022 by Dr. Mauri Damian. Her kidney numbers have remained stable after getting off dialysis. Patient reports that she started noticing increasing swelling of her lower extremities and increasing shortness of breath on minimal exertion. Patient followed up with her PCP who advised patient to get chest x-ray done which showed congestion. Patient was then asked by PCP to come to the hospital.     In the ER patient was noted to be hypertensive with blood pressure 186/88, she was hypoxic saturating 78% on room air. She was initially started on nonrebreather and then switched to BiPAP. On my examination patient was on BiPAP. She was also started on nitro drip for hypertension and pulmonary edema. Her creatinine is around 2.12. Troponin of 82 and 83. Review of Systems:     Constitutional:  negative for chills, fevers, sweats  Respiratory:  Neg shortness of breath. Negative for wheezing, throat swelling. Cardiovascular: Positive for 1 +lower extremity edema. Negative for chest pain, palpitations  Gastrointestinal:  negative for abdominal pain, constipation, diarrhea, nausea, vomiting  Neurological:  negative for dizziness, headache    Medications: Allergies: Allergies   Allergen Reactions    Adhesive Tape Other (See Comments) and Rash     Other reaction(s): Unknown    Isosorbide Dinitrate Angioedema    Ranexa [Ranolazine] Angioedema    Metformin And Related Diarrhea    Ace Inhibitors Other (See Comments)     Cough, states not good for her kidneys    Iv Dye [Iodides]      Stage 4 kidney disease    Nsaids      CANNOT TAKE D/T KIDNEY DISEASE    Metformin And Related Hives, Itching and Rash     Stage 4 kidney disease.        Current Meds:   Scheduled Meds:    metOLazone  5 mg Oral Daily    bumetanide  2 mg IntraVENous BID    insulin glargine  20 Units SubCUTAneous BID    carvedilol  12.5 mg Oral BID    tamsulosin  0.4 mg Oral Daily    insulin lispro  0-16 Units SubCUTAneous TID     insulin lispro  0-4 Units SubCUTAneous Nightly    ipratropium-albuterol  1 ampule Inhalation Q4H WA    gabapentin  300 mg Oral Nightly    docusate sodium  100 mg Oral BID    [Held by provider] ranolazine  1,000 mg Oral Daily    [Held by provider] isosorbide dinitrate  10 mg Oral TID    aspirin  81 mg Oral Daily    atorvastatin  40 mg Oral Nightly    sodium bicarbonate  1,300 mg Oral BID    pantoprazole  40 mg Oral QAM AC    sodium chloride flush  5-40 mL IntraVENous 2 times per day    heparin (porcine)  5,000 Units SubCUTAneous 3 times per day    amLODIPine  10 mg Oral Daily     Continuous Infusions:    dextrose      sodium chloride       PRN Meds: benzocaine-menthol, melatonin, melatonin, glucose, dextrose bolus **OR** dextrose bolus, glucagon (rDNA), dextrose, sodium chloride flush, sodium chloride, potassium chloride **OR** potassium alternative oral replacement **OR** potassium chloride, magnesium sulfate, ondansetron **OR** ondansetron, polyethylene glycol, acetaminophen **OR** acetaminophen, labetalol, hydrALAZINE    Data:     Past Medical History:   has a past medical history of Arthritis, Backache, unspecified, Cerebral artery occlusion with cerebral infarction ([facility] CHF (congestive heart failure) ([facility] Chronic kidney disease, Coronary atherosclerosis of artery bypass graft, COVID, Cramp of limb, Gallstones, Hemodialysis patient ([facility] Hyperlipidemia, Hypertension, Insomnia, Neuromuscular disorder ([facility] Pneumonia, Psychiatric problem, Thyroid disease, Type II or unspecified type diabetes mellitus with renal manifestations, not stated as uncontrolled(250.40), Type II or unspecified type diabetes mellitus without mention of complication, not stated as uncontrolled, and Unspecified vitamin D deficiency.     Social History: reports that she has never smoked. She has never used smokeless tobacco. She reports that she does not drink alcohol and does not use drugs. Family History:   Family History   Problem Relation Age of Onset    Diabetes Father     Heart Failure Father        Vitals:  /68   Pulse 53   Temp 98 °F (36.7 °C) (Temporal)   Resp 17   Ht 5' 5\" (1.651 m)   Wt 248 lb 2.6 oz (112.6 kg)   SpO2 94%   BMI 41.30 kg/m²   Temp (24hrs), Av.2 °F (36.8 °C), Min:97.9 °F (36.6 °C), Max:98.6 °F (37 °C)    Recent Labs     23  0800 23  1155 23  1623 23  1938   POCGLU 162* 326* 276* 290*         I/O (24Hr):     Intake/Output Summary (Last 24 hours) at 2/3/2023 0804  Last data filed at 2/3/2023 0631  Gross per 24 hour   Intake 650 ml   Output 3070 ml   Net -2420 ml         Labs:  Hematology:  Recent Labs     23  0538 23  0642 23  0323   WBC 6.0 5.6 5.9   RBC 3.72* 3.67* 3.51*   HGB 11.7* 11.5* 11.1*   HCT 37.3 36.1* 34.4*   .3 98.4 98.0   MCH 31.5 31.3 31.6   MCHC 31.4 31.9 32.3   RDW 14.9* 14.6* 14.3    134* 144   MPV 12.2 12.0 12.0       Chemistry:  Recent Labs     23  2104 23  0538 23  0642 23  0323   NA  --  142 140 138   K  --  4.2 4.9 4.6   CL  --  103 101 99   CO2  --  25 28 29   GLUCOSE  --  73 163* 233*   BUN  --  65* 69* 67*   CREATININE  --  2.99* 3.05* 2.92*   ANIONGAP  --  14 11 10   LABGLOM  --  17* 17* 17*   CALCIUM  --  8.8 8.9 8.9   TROPHS 68*  --   --   --        Recent Labs     23  1536 23  1954 23  0800 23  1155 23  1623 23  193   POCGLU 370* 297* 162* 326* 276* 290*       ABG:  Lab Results   Component Value Date/Time    PHART 7.429 2022 01:42 PM    KYS7VTQ 45.9 2022 01:42 PM    PO2ART 47.6 2022 01:42 PM    POE4BVC 30.4 2022 01:42 PM    PBEA 6.1 2022 01:42 PM    M8DAVOHU 82.8 2022 01:42 PM    FIO2 INFORMATION NOT PROVIDED 2023 10:59 AM     Lab Results   Component Value Date/Time    SPECIAL RIGHT HAND 2ML 08/03/2022 02:22 AM     Lab Results   Component Value Date/Time    CULTURE NO SIGNIFICANT GROWTH 01/10/2023 02:14 PM       Radiology:  XR CHEST (2 VW)    Result Date: 1/24/2023  Findings suggest congestive heart failure The findings were sent to the Radiology Results [address] at 5:31 pm on 1/24/2023 to be communicated to a licensed caregiver. XR CHEST PORTABLE    Result Date: 1/25/2023  Appearance favoring worsening Vivian Cheatham vascular congestion/interstitial edema. Physical Examination:        General appearance:  alert, cooperative and no distress. Saturating well on 3 L oxygen via nasal cannula  Mental Status:  oriented to person, place and time and normal affect  HEENT: No significant tongue or throat throat edema. Lungs:  Normal effort. Normal bilateral air entry. Heart:  regular rate and rhythm, no murmur  Abdomen:  soft, nontender, nondistended, bowel sounds are present.   Extremities: 1+ pitting bilateral lower extremity edema, no redness, no tenderness in the calves  Skin:  no gross lesions, rashes, induration    Assessment:        Hospital Problems             Last Modified POA    * (Principal) Acute respiratory distress 1/25/2023 Yes    Type 2 diabetes mellitus with hyperglycemia, with long-term current use of insulin ([address] 1/25/2023 Yes    Hypertensive urgency 1/25/2023 Yes    Hypoxia 1/25/2023 Yes    Congestive heart failure ([address] 1/26/2023 Yes    Nephrotic range proteinuria 1/26/2023 Yes    Acute respiratory failure with hypoxia ([address] 2/1/2023 Yes    Atherosclerosis of coronary artery bypass graft of native heart without angina pectoris 1/25/2023 Yes    Acute on chronic diastolic heart failure ([address] 1/25/2023 Yes    Diabetic polyneuropathy associated with type 2 diabetes mellitus ([address] 1/25/2023 Yes    History of coronary artery bypass graft 1/25/2023 Yes    Mixed hyperlipidemia 1/25/2023 Yes    CKD (chronic kidney disease) stage 4, GFR 15-29 ml/min ([facility] 1/31/2023 Yes    Hypertension, essential 2/1/2023 Yes    Morbid obesity with BMI of 40.0-44.9, adult ([facility] 1/25/2023 Yes   Plan:        Laryngeal edema. Resolved. Will avoid isosorbide dinitrate and Ranexa at this time due to suspected anaphylaxis. Acute respiratory failure with hypoxia. Secondary to #3. Currently saturating well on 3 L nasal cannula. Acute on chronic diastolic congestive heart failure-much better. .On IV Bumex 2 mg twice daily and metolazone 5 mg daily. Appreciate nephrology recommendations. Elevated troponin likely from worsening renal function. Appreciate cardiology recommendations. Cardiology signed off    Hypertension. Continue amlodipine 10 mg daily, Coreg 12.5 mg twice daily     AURELIA on Stage IV CKD. Creatinine better. Continued on IV Bumex 2 mg twice daily and metolazone 5 mg daily. Sonia Hoover Appreciate nephrology recommendations. Type 2 diabetes mellitus. on Lantus to 230 units BID. High-dose corrective algorithm. POCT glucose and hypoglycemia protocol. Hyperlipidemia. Continue Lipitor 40 mg nightly. CAD status post CABG. last cath 2019 showed all patent grafts. Continue aspirin 81 mg daily, Lipitor 40 mg nightly, Coreg 12.5 mg twice daily. Morbid obesity. Encourage lifestyle modification with diet and exercise. DVT prophylaxis: Heparin 5000 units subcutaneously every 8 hours. GI prophylaxis: Protonix 40 mg daily. Discharge planning: Plan is discharged to [facility] once medically stable.      Muna Cabrera MD  2/3/2023  8:04 AM

## 2023-02-03 NOTE — PLAN OF CARE
Problem: Respiratory - Adult  Goal: Achieves optimal ventilation and oxygenation  2/3/2023 1627 by Inge Ly RCP  Outcome: Progressing  Flowsheets (Taken 2/3/2023 1627)  Achieves optimal ventilation and oxygenation:   Assess for changes in respiratory status   Respiratory therapy support as indicated   Initiate smoking cessation protocol as indicated   Encourage broncho-pulmonary hygiene including cough, deep breathe, incentive spirometry   Assess and instruct to report shortness of breath or any respiratory difficulty

## 2023-02-04 LAB
ABSOLUTE EOS #: 0.24 K/UL (ref 0–0.44)
ABSOLUTE IMMATURE GRANULOCYTE: 0.04 K/UL (ref 0–0.3)
ABSOLUTE LYMPH #: 0.96 K/UL (ref 1.1–3.7)
ABSOLUTE MONO #: 0.45 K/UL (ref 0.1–1.2)
ANION GAP SERPL CALCULATED.3IONS-SCNC: 12 MMOL/L (ref 9–17)
BASOPHILS # BLD: 1 % (ref 0–2)
BASOPHILS ABSOLUTE: 0.04 K/UL (ref 0–0.2)
BUN SERPL-MCNC: 64 MG/DL (ref 8–23)
CALCIUM SERPL-MCNC: 9.3 MG/DL (ref 8.6–10.4)
CHLORIDE SERPL-SCNC: 98 MMOL/L (ref 98–107)
CO2 SERPL-SCNC: 28 MMOL/L (ref 20–31)
CREAT SERPL-MCNC: 2.59 MG/DL (ref 0.5–0.9)
EOSINOPHILS RELATIVE PERCENT: 5 % (ref 1–4)
GFR SERPL CREATININE-BSD FRML MDRD: 20 ML/MIN/1.73M2
GLUCOSE BLD-MCNC: 223 MG/DL (ref 65–105)
GLUCOSE BLD-MCNC: 272 MG/DL (ref 65–105)
GLUCOSE BLD-MCNC: 332 MG/DL (ref 65–105)
GLUCOSE BLD-MCNC: 364 MG/DL (ref 65–105)
GLUCOSE BLD-MCNC: 405 MG/DL (ref 65–105)
GLUCOSE BLD-MCNC: 417 MG/DL (ref 65–105)
GLUCOSE SERPL-MCNC: 219 MG/DL (ref 70–99)
HCT VFR BLD AUTO: 36.5 % (ref 36.3–47.1)
HGB BLD-MCNC: 11.4 G/DL (ref 11.9–15.1)
IMMATURE GRANULOCYTES: 1 %
LYMPHOCYTES # BLD: 18 % (ref 24–43)
MCH RBC QN AUTO: 30.9 PG (ref 25.2–33.5)
MCHC RBC AUTO-ENTMCNC: 31.2 G/DL (ref 28.4–34.8)
MCV RBC AUTO: 98.9 FL (ref 82.6–102.9)
MONOCYTES # BLD: 8 % (ref 3–12)
NRBC AUTOMATED: 0 PER 100 WBC
PDW BLD-RTO: 14 % (ref 11.8–14.4)
PLATELET # BLD AUTO: 145 K/UL (ref 138–453)
PMV BLD AUTO: 12.2 FL (ref 8.1–13.5)
POTASSIUM SERPL-SCNC: 4.8 MMOL/L (ref 3.7–5.3)
RBC # BLD: 3.69 M/UL (ref 3.95–5.11)
SEG NEUTROPHILS: 67 % (ref 36–65)
SEGMENTED NEUTROPHILS ABSOLUTE COUNT: 3.66 K/UL (ref 1.5–8.1)
SODIUM SERPL-SCNC: 138 MMOL/L (ref 135–144)
WBC # BLD AUTO: 5.4 K/UL (ref 3.5–11.3)

## 2023-02-04 PROCEDURE — 6370000000 HC RX 637 (ALT 250 FOR IP): Performed by: STUDENT IN AN ORGANIZED HEALTH CARE EDUCATION/TRAINING PROGRAM

## 2023-02-04 PROCEDURE — 6360000002 HC RX W HCPCS: Performed by: STUDENT IN AN ORGANIZED HEALTH CARE EDUCATION/TRAINING PROGRAM

## 2023-02-04 PROCEDURE — 80048 BASIC METABOLIC PNL TOTAL CA: CPT

## 2023-02-04 PROCEDURE — 85025 COMPLETE CBC W/AUTO DIFF WBC: CPT

## 2023-02-04 PROCEDURE — 6370000000 HC RX 637 (ALT 250 FOR IP): Performed by: CLINICAL NURSE SPECIALIST

## 2023-02-04 PROCEDURE — 94640 AIRWAY INHALATION TREATMENT: CPT

## 2023-02-04 PROCEDURE — 36415 COLL VENOUS BLD VENIPUNCTURE: CPT

## 2023-02-04 PROCEDURE — 2060000000 HC ICU INTERMEDIATE R&B

## 2023-02-04 PROCEDURE — 6370000000 HC RX 637 (ALT 250 FOR IP): Performed by: NURSE PRACTITIONER

## 2023-02-04 PROCEDURE — 82947 ASSAY GLUCOSE BLOOD QUANT: CPT

## 2023-02-04 PROCEDURE — 6370000000 HC RX 637 (ALT 250 FOR IP)

## 2023-02-04 PROCEDURE — 99232 SBSQ HOSP IP/OBS MODERATE 35: CPT | Performed by: STUDENT IN AN ORGANIZED HEALTH CARE EDUCATION/TRAINING PROGRAM

## 2023-02-04 PROCEDURE — 2500000003 HC RX 250 WO HCPCS: Performed by: INTERNAL MEDICINE

## 2023-02-04 PROCEDURE — 94761 N-INVAS EAR/PLS OXIMETRY MLT: CPT

## 2023-02-04 PROCEDURE — 99232 SBSQ HOSP IP/OBS MODERATE 35: CPT | Performed by: INTERNAL MEDICINE

## 2023-02-04 PROCEDURE — 2700000000 HC OXYGEN THERAPY PER DAY

## 2023-02-04 PROCEDURE — 2500000003 HC RX 250 WO HCPCS: Performed by: NURSE PRACTITIONER

## 2023-02-04 PROCEDURE — 2580000003 HC RX 258: Performed by: STUDENT IN AN ORGANIZED HEALTH CARE EDUCATION/TRAINING PROGRAM

## 2023-02-04 RX ORDER — INSULIN GLARGINE 100 [IU]/ML
10 INJECTION, SOLUTION SUBCUTANEOUS ONCE
Status: COMPLETED | OUTPATIENT
Start: 2023-02-04 | End: 2023-02-04

## 2023-02-04 RX ORDER — INSULIN LISPRO 100 [IU]/ML
15 INJECTION, SOLUTION INTRAVENOUS; SUBCUTANEOUS ONCE
Status: COMPLETED | OUTPATIENT
Start: 2023-02-04 | End: 2023-02-04

## 2023-02-04 RX ORDER — BUMETANIDE 1 MG/1
3 TABLET ORAL 2 TIMES DAILY
Status: DISCONTINUED | OUTPATIENT
Start: 2023-02-04 | End: 2023-02-06 | Stop reason: HOSPADM

## 2023-02-04 RX ORDER — INSULIN GLARGINE 100 [IU]/ML
40 INJECTION, SOLUTION SUBCUTANEOUS 2 TIMES DAILY
Status: DISCONTINUED | OUTPATIENT
Start: 2023-02-04 | End: 2023-02-05

## 2023-02-04 RX ADMIN — DOCUSATE SODIUM 100 MG: 100 CAPSULE ORAL at 21:17

## 2023-02-04 RX ADMIN — IPRATROPIUM BROMIDE AND ALBUTEROL SULFATE 1 AMPULE: 2.5; .5 SOLUTION RESPIRATORY (INHALATION) at 15:22

## 2023-02-04 RX ADMIN — GUAIFENESIN 200 MG: 200 SOLUTION ORAL at 21:16

## 2023-02-04 RX ADMIN — Medication 10 MG: at 21:23

## 2023-02-04 RX ADMIN — HEPARIN SODIUM 5000 UNITS: 5000 INJECTION INTRAVENOUS; SUBCUTANEOUS at 17:24

## 2023-02-04 RX ADMIN — AMLODIPINE BESYLATE 10 MG: 10 TABLET ORAL at 08:46

## 2023-02-04 RX ADMIN — INSULIN LISPRO 16 UNITS: 100 INJECTION, SOLUTION INTRAVENOUS; SUBCUTANEOUS at 17:25

## 2023-02-04 RX ADMIN — SODIUM BICARBONATE 1300 MG: 648 TABLET ORAL at 21:15

## 2023-02-04 RX ADMIN — IPRATROPIUM BROMIDE AND ALBUTEROL SULFATE 1 AMPULE: 2.5; .5 SOLUTION RESPIRATORY (INHALATION) at 07:59

## 2023-02-04 RX ADMIN — SODIUM CHLORIDE, PRESERVATIVE FREE 10 ML: 5 INJECTION INTRAVENOUS at 21:17

## 2023-02-04 RX ADMIN — HEPARIN SODIUM 5000 UNITS: 5000 INJECTION INTRAVENOUS; SUBCUTANEOUS at 00:36

## 2023-02-04 RX ADMIN — SODIUM CHLORIDE, PRESERVATIVE FREE 10 ML: 5 INJECTION INTRAVENOUS at 08:49

## 2023-02-04 RX ADMIN — INSULIN LISPRO 4 UNITS: 100 INJECTION, SOLUTION INTRAVENOUS; SUBCUTANEOUS at 08:58

## 2023-02-04 RX ADMIN — CARVEDILOL 12.5 MG: 12.5 TABLET, FILM COATED ORAL at 21:15

## 2023-02-04 RX ADMIN — INSULIN LISPRO 16 UNITS: 100 INJECTION, SOLUTION INTRAVENOUS; SUBCUTANEOUS at 12:07

## 2023-02-04 RX ADMIN — BUMETANIDE 2 MG: 0.25 INJECTION INTRAMUSCULAR; INTRAVENOUS at 09:02

## 2023-02-04 RX ADMIN — INSULIN LISPRO 15 UNITS: 100 INJECTION, SOLUTION INTRAVENOUS; SUBCUTANEOUS at 19:02

## 2023-02-04 RX ADMIN — SODIUM BICARBONATE 1300 MG: 648 TABLET ORAL at 08:46

## 2023-02-04 RX ADMIN — IPRATROPIUM BROMIDE AND ALBUTEROL SULFATE 1 AMPULE: 2.5; .5 SOLUTION RESPIRATORY (INHALATION) at 20:39

## 2023-02-04 RX ADMIN — GABAPENTIN 300 MG: 300 CAPSULE ORAL at 21:15

## 2023-02-04 RX ADMIN — METOLAZONE 5 MG: 5 TABLET ORAL at 10:16

## 2023-02-04 RX ADMIN — ASPIRIN 81 MG: 81 TABLET, CHEWABLE ORAL at 08:46

## 2023-02-04 RX ADMIN — IPRATROPIUM BROMIDE AND ALBUTEROL SULFATE 1 AMPULE: 2.5; .5 SOLUTION RESPIRATORY (INHALATION) at 12:07

## 2023-02-04 RX ADMIN — HEPARIN SODIUM 5000 UNITS: 5000 INJECTION INTRAVENOUS; SUBCUTANEOUS at 08:55

## 2023-02-04 RX ADMIN — INSULIN GLARGINE 30 UNITS: 100 INJECTION, SOLUTION SUBCUTANEOUS at 08:59

## 2023-02-04 RX ADMIN — PANTOPRAZOLE SODIUM 40 MG: 40 TABLET, DELAYED RELEASE ORAL at 05:55

## 2023-02-04 RX ADMIN — ATORVASTATIN CALCIUM 40 MG: 80 TABLET, FILM COATED ORAL at 21:16

## 2023-02-04 RX ADMIN — BUMETANIDE 3 MG: 1 TABLET ORAL at 21:16

## 2023-02-04 RX ADMIN — DOCUSATE SODIUM 100 MG: 100 CAPSULE ORAL at 08:47

## 2023-02-04 RX ADMIN — INSULIN GLARGINE 40 UNITS: 100 INJECTION, SOLUTION SUBCUTANEOUS at 21:17

## 2023-02-04 RX ADMIN — TAMSULOSIN HYDROCHLORIDE 0.4 MG: 0.4 CAPSULE ORAL at 08:46

## 2023-02-04 RX ADMIN — INSULIN GLARGINE 10 UNITS: 100 INJECTION, SOLUTION SUBCUTANEOUS at 19:02

## 2023-02-04 NOTE — PLAN OF CARE
Problem: Discharge Planning  Goal: Discharge to home or other facility with appropriate resources  Outcome: Progressing     Problem: Nutrition Deficit:  Goal: Optimize nutritional status  Outcome: Progressing

## 2023-02-04 NOTE — PROGRESS NOTES
Nephrology Progress Note      SUBJECTIVE      Patient seen and examined bedside  No acute issues overnight  Patient is awake alert and oriented  Reports feeling okay overall    Has been afebrile, heart rate and blood pressure okay, on 3 L nasal cannula  Labs this morning solidum 138, potassium 4.8, bicarb 28, BUN 64, creatinine 2.59, wbc 5.4, hb 11.4, platelets 851  Urine output according to chart 3.7 L    Discharge planning to SNF, got accepted, pre-CERT good over the weekend      HPI -   This is a 57 y.o. female who presented to the hospital for evaluation of increasing edema, increasing weight gain of about 8 to 9 pounds, and a progressive exertional dyspnea. The symptoms started about 8 or 9 days ago, she did seek medical attention the PCP who ordered a chest x-ray and was concerned about some congestive heart failure. Patient was contacted and advised to come to the hospital.     She is an office patient of Dr. Angela Vargas. Previously she has had acute on chronic renal injury and ended up on dialysis for few months secondary likely to contrast associated renal injury and as of August 2022 she has been off dialysis. Her serum creatinine seems to be hovering around 2.2 -2.4 range. At home she typically takes furosemide twice daily (120 mg twice a day). She tells me she has been very compliant in regards to her that medication use. She does not give any history of recent change in her diet, increasing salt intake, noncompliance to medications, does watch her fluids at home as well. She is going having some exertional dyspnea along with. She denies any palpitations or any episodes of syncope. She did not give any history of orthopnea. She denies any NSAID use. Patient has had a longstanding history of diabetes mellitus type 2. Her CKD was originally thought to be related to diabetic renal disease. She does also have history of essential hypertension, dyslipidemia, diastolic CHF.   She was last evaluated in the office approximately 4 to 5 weeks ago at which time her physical exam and her labs were actually relatively stable. Admission chest x-ray showed evidence of vascular congestion  Last cardiac echo from 2022 showed evidence of mild diastolic dysfunction, LV function preserved at greater than 55%, moderate left ventricular hypertrophy. On review of most recent urine studies, she seems to have developed worsening proteinuria. Her proteinuria last year was quantified somewhere around 2.5 g and most recent studies indicate proteinuria to be almost at 5 g. She denies any history of diabetic retinopathy but does admit to symptoms of neuropathy. Pt denies any hx of heavy or prolonged NSAID use. There is no history of blood or bone marrow disorders. There is no hx of jaundice or hepatitis or sexually transmitted disease. Pt has no hx of collagen vascular disease or vasculitis. There is no hx of paraprotein disease. No hx of recurrent UTI , incontinence or recurrent nephrolithiasis.       Started on IV Lasix infusion on 2023, stop Lasix infusion on 2023, started on Bumex IV 2 mg twice daily    OBJECTIVE      CURRENT TEMPERATURE:  Temp: 97.9 °F (36.6 °C)  MAXIMUM TEMPERATURE OVER 24HRS:  Temp (24hrs), Av.9 °F (36.6 °C), Min:97.6 °F (36.4 °C), Max:98.4 °F (36.9 °C)    CURRENT RESPIRATORY RATE:  Resp: 16  CURRENT PULSE:  Heart Rate: 61  CURRENT BLOOD PRESSURE:  BP: (!) 118/58  24HR BLOOD PRESSURE RANGE:  Systolic (26KYB), GBA:251 , Min:118 , SOX:208   ; Diastolic (45QSZ), PZD:71, Min:52, Max:78    24HR INTAKE/OUTPUT:    Intake/Output Summary (Last 24 hours) at 2023 9753  Last data filed at 2023 0556  Gross per 24 hour   Intake 90 ml   Output 3750 ml   Net -3660 ml       PHYSICAL EXAM      GENERAL APPEARANCE:Awake, alert, in no acute distress  SKIN: warm and dry, no rash or erythema  EYES: conjunctivae normal and sclera anicteric  NECK:  JVD: None   PULMONARY: Clear on auscultation  CADRDIOVASCULAR: Normal heart sounds  ABDOMEN: Soft, nontender  EXTREMITIES: Pedal edema much improved    CURRENT MEDICATIONS      insulin glargine (LANTUS) injection vial 30 Units, BID  guaiFENesin (ROBITUSSIN) 100 MG/5ML liquid 200 mg, Q4H PRN  metOLazone (ZAROXOLYN) tablet 5 mg, Daily  bumetanide (BUMEX) injection 2 mg, BID  carvedilol (COREG) tablet 12.5 mg, BID  tamsulosin (FLOMAX) capsule 0.4 mg, Daily  insulin lispro (HUMALOG) injection vial 0-16 Units, TID WC  insulin lispro (HUMALOG) injection vial 0-4 Units, Nightly  ipratropium-albuterol (DUONEB) nebulizer solution 1 ampule, Q4H WA  benzocaine-menthol (CEPACOL SORE THROAT) lozenge 1 lozenge, Q2H PRN  gabapentin (NEURONTIN) capsule 300 mg, Nightly  melatonin tablet 3 mg, Nightly PRN  melatonin tablet 10 mg, Nightly PRN  glucose chewable tablet 16 g, PRN  dextrose bolus 10% 125 mL, PRN   Or  dextrose bolus 10% 250 mL, PRN  glucagon (rDNA) injection 1 mg, PRN  dextrose 10 % infusion, Continuous PRN  docusate sodium (COLACE) capsule 100 mg, BID  [Held by provider] ranolazine (RANEXA) extended release tablet 1,000 mg, Daily  [Held by provider] isosorbide dinitrate (ISORDIL) tablet 10 mg, TID  aspirin chewable tablet 81 mg, Daily  atorvastatin (LIPITOR) tablet 40 mg, Nightly  sodium bicarbonate tablet 1,300 mg, BID  pantoprazole (PROTONIX) tablet 40 mg, QAM AC  sodium chloride flush 0.9 % injection 5-40 mL, 2 times per day  sodium chloride flush 0.9 % injection 10 mL, PRN  0.9 % sodium chloride infusion, PRN  potassium chloride (KLOR-CON M) extended release tablet 40 mEq, PRN   Or  potassium bicarb-citric acid (EFFER-K) effervescent tablet 40 mEq, PRN   Or  potassium chloride 10 mEq/100 mL IVPB (Peripheral Line), PRN  magnesium sulfate 1000 mg in dextrose 5% 100 mL IVPB, PRN  ondansetron (ZOFRAN-ODT) disintegrating tablet 4 mg, Q8H PRN   Or  ondansetron (ZOFRAN) injection 4 mg, Q6H PRN  polyethylene glycol (GLYCOLAX) packet 17 g, Daily PRN  acetaminophen (TYLENOL) tablet 650 mg, Q6H PRN   Or  acetaminophen (TYLENOL) suppository 650 mg, Q6H PRN  heparin (porcine) injection 5,000 Units, 3 times per day  labetalol (NORMODYNE;TRANDATE) injection 10 mg, Q6H PRN  amLODIPine (NORVASC) tablet 10 mg, Daily  hydrALAZINE (APRESOLINE) injection 10 mg, Q6H PRN          LABS      CBC:   Recent Labs     02/02/23  0642 02/03/23 0323 02/04/23 0312   WBC 5.6 5.9 5.4   RBC 3.67* 3.51* 3.69*   HGB 11.5* 11.1* 11.4*   HCT 36.1* 34.4* 36.5   MCV 98.4 98.0 98.9   MCH 31.3 31.6 30.9   MCHC 31.9 32.3 31.2   RDW 14.6* 14.3 14.0   * 144 145   MPV 12.0 12.0 12.2      BMP:   Recent Labs     02/02/23 0642 02/03/23 0323    138   K 4.9 4.6    99   CO2 28 29   BUN 69* 67*   CREATININE 3.05* 2.92*   GLUCOSE 163* 233*   CALCIUM 8.9 8.9      BNP:  Lab Results   Component Value Date/Time    BNP 41 04/09/2015 12:00 AM     PHOSPHORUS:  No results for input(s): PHOS in the last 72 hours. MAGNESIUM: No results for input(s): MG in the last 72 hours. ALBUMIN: No results for input(s): LABALBU in the last 72 hours.   IRON:    Lab Results   Component Value Date/Time    IRON 83 09/27/2022 01:32 PM     IRON SATURATION:    Lab Results   Component Value Date/Time    LABIRON 38 09/27/2022 01:32 PM     TIBC:    Lab Results   Component Value Date/Time    TIBC 217 09/27/2022 01:32 PM     FERRITIN:    Lab Results   Component Value Date/Time    FERRITIN 117 09/27/2022 01:32 PM     RUSSELL:   Lab Results   Component Value Date    RUSSELL NEGATIVE 08/03/2022       SPEP:   Lab Results   Component Value Date/Time    PROT 7.0 12/01/2022 10:25 AM     UPEP:   Lab Results   Component Value Date/Time    TPU 20 11/12/2021 10:30 AM      HEPBSAG:  Lab Results   Component Value Date/Time    HEPBSAG NONREACTIVE 03/30/2022 03:30 PM     HEPCAB:  Lab Results   Component Value Date/Time    HEPCAB NONREACTIVE 03/30/2022 03:30 PM     C3: No results found for: C3  C4: No results found for: C4  MPO ANCA: No results found for: MPO . PR3 ANCA:  No results found for: PR3  URINE SODIUM:    Lab Results   Component Value Date/Time    JASON 47 11/14/2021 02:04 AM      URINE POTASSIUM:  No results found for: KUR  URINE CHLORIDE:  No results found for: CLU  URINE PH:  No components found for: PO4U  URINE OSMOLARITY:  No results found for: OSMOU  URINE CREATININE:    Lab Results   Component Value Date/Time    LABCREA 65.4 01/26/2023 01:10 PM     URINE EOSINOPHILS: No components found for: EOSU  URINE PROTEIN:    Lab Results   Component Value Date/Time    TPU 20 11/12/2021 10:30 AM     URINALYSIS:  U/A:   Lab Results   Component Value Date/Time    NITRU NEGATIVE 01/10/2023 02:14 PM    COLORU Yellow 01/10/2023 02:14 PM    PHUR 7.0 01/10/2023 02:14 PM    WBCUA 2 TO 5 01/10/2023 02:14 PM    RBCUA 0 TO 2 01/10/2023 02:14 PM    MUCUS 2+ 07/31/2022 12:00 PM    TRICHOMONAS NOT REPORTED 11/14/2021 02:05 AM    YEAST FEW 07/02/2022 09:10 AM    BACTERIA FEW 01/10/2023 02:14 PM    SPECGRAV 1.021 01/10/2023 02:14 PM    LEUKOCYTESUR SMALL 01/10/2023 02:14 PM    UROBILINOGEN Normal 01/10/2023 02:14 PM    BILIRUBINUR NEGATIVE 01/10/2023 02:14 PM    GLUCOSEU NEGATIVE 01/10/2023 02:14 PM    KETUA NEGATIVE 01/10/2023 02:14 PM    AMORPHOUS 2+ 07/31/2022 12:00 PM     ANTIGBM:No results found for: GBMABIGG      RADIOLOGY      Reviewed as available. ASSESSMENT      1. CKD 4 likely due to underlying diabetic renal involvement. Was on dialysis for several months, got off of dialysis about 5-1/2 months ago and later taken off of HD. Her creatinine currently seems to be running around high 2's to low 3's.    2.  Acute on chronic diastolic CHF  3. Hypertensive urgency  3. Acute on chronic hypoxic respiratory failure  4. Diabetes mellitus type 2  5. CAD s/p CABG, last cath in October 2019  6. Essential hypertension  7. Obesity  8. History of DVT    PLAN      1.   Change Bumex IV to oral 3 mg twice daily, continue metolazone 5 mg daily, plan to see the response with oral diuretics  2. Strict I's and O's, daily weights  3. Continue to hold on starting ARB for now  4. BMP in the a.m.  5.  Will follow      Martin Farley  PGY-2  Internal Medicine  [facility]   8:39 AM 2/4/2023   Attending Physician Statement  I have discussed the care of Maria R Timmons, including pertinent history and exam findings,  with the Fellow/Residentt. I have reviewed the key elements of all parts of the encounter with the Fellow/ Resident. I agree with the assessment, plan and orders as documented by the resident.     Isela Bhatia MD MD, MRCP Cammie Burton, FACP   2/4/2023 2:25 PM    Nephrology [address] [place] [address]

## 2023-02-04 NOTE — PLAN OF CARE
Problem: Respiratory - Adult  Goal: Achieves optimal ventilation and oxygenation  Outcome: Progressing   BRONCHOSPASM/BRONCHOCONSTRICTION   [x]         IMPROVE AERATION/BREATH SOUNDS  [x]   ADMINISTER BRONCHODILATOR THERAPY AS APPROPRIATE  [x]   ASSESS BREATH SOUNDS  []   IMPLEMENT AEROSOL/MDI PROTOCOL  [x]   PATIENT EDUCATION AS NEEDED   PROVIDE ADEQUATE OXYGENATION WITH ACCEPTABLE SP02/ABG'S  [x]  IDENTIFY APPROPRIATE OXYGEN THERAPY  [x]   MONITOR SP02/ABG'S AS NEEDED   [x]   PATIENT EDUCATION AS NEEDED

## 2023-02-04 NOTE — PROGRESS NOTES
[facility]  Office: [address], DO, Teresa Barcenas, DO, Torey Garciafranco, DO, Stephanie Armando Gill, DO, Jessica Jefferson MD, Mouna Post MD, Adrianna Gautam MD, Eliezer Valenzuela MD,  Sylvester Demarco MD, Peggy Garcia MD, Diomedes Tineo, DO, Clarissa Guevara MD,  Orville Corcoran MD, Anson Desai MD, Monique Nieves, DO, Clif Krause MD, José Miguel Lama MD, Alfredo Mckeon DO, Mono Castro MD, Jessica Castano MD, Angel Ospina MD, Celso Manrique MD, Griselda Contreras DO, Pepper Leyva MD, Bert Goncalves MD, Gavin Coates, Ian Reynoso, CNP, Collette Mercy, CNP, Judy Pressley, CNP,  Wilder Pinto, [facility], Silvestre Brooks, CNP, Aurora Rodriguez, CNP, Christine Traylor, CNP, Carlos Best, CNP, Ian Nino, CNP, Eren Ryan PA-C, Marina Carrillo, CNS, Luz Jansen, CNP, Adam Camejo, CNP         Enrike Rodgers [address]    Progress Note    2/4/2023    7:34 AM    Name:   Ronney Apley  MRN:     [id number]     Acct:      [de-identified]   Room:   [address] [place] [address] Day:  10  Admit Date:  1/25/2023 10:13 AM    PCP:   AFSANEH Miramontes CNP  Code Status:  Full Code    Subjective:     C/C:   Chief Complaint   Patient presents with    Shortness of Breath    Weight Gain     Interval History Status: improved. Hemodynamically stable. Saturating well on 3 L oxygen. Weaning of  Creatinine improving. Good urine output overnight . switch to Bumex oral 3 mg twice daily and continue metolazone. Likely discharge tomorrow. Brief History:     Ronney Apley is a 57 y.o. Non- / non  female who presents with Shortness of Breath and Weight Gain   and is admitted to the hospital for the management of Acute respiratory distress. Complicated by AURELIA on CKD.      57year-old female with known medical history of diastolic heart failure, CKD, hypertension, hyperlipidemia, type 2 diabetes mellitus, coronary artery disease with history of CABG presents to the hospital with worsening shortness of breath and leg swelling for last 1 month. Patient states that she was on dialysis after she got contrast for a CT PE scan and was recently taken off dialysis in August 2022 by Dr. Cora Cornelius. Her kidney numbers have remained stable after getting off dialysis. Patient reports that she started noticing increasing swelling of her lower extremities and increasing shortness of breath on minimal exertion. Patient followed up with her PCP who advised patient to get chest x-ray done which showed congestion. Patient was then asked by PCP to come to the hospital.     In the ER patient was noted to be hypertensive with blood pressure 186/88, she was hypoxic saturating 78% on room air. She was initially started on nonrebreather and then switched to BiPAP. On my examination patient was on BiPAP. She was also started on nitro drip for hypertension and pulmonary edema. Her creatinine is around 2.12. Troponin of 82 and 83. Review of Systems:     Constitutional:  negative for chills, fevers, sweats  Respiratory:  Neg shortness of breath. Negative for wheezing, throat swelling. Cardiovascular: Positive for 1 +lower extremity edema. Negative for chest pain, palpitations  Gastrointestinal:  negative for abdominal pain, constipation, diarrhea, nausea, vomiting  Neurological:  negative for dizziness, headache    Medications: Allergies: Allergies   Allergen Reactions    Adhesive Tape Other (See Comments) and Rash     Other reaction(s): Unknown    Isosorbide Dinitrate Angioedema    Ranexa [Ranolazine] Angioedema    Metformin And Related Diarrhea    Ace Inhibitors Other (See Comments)     Cough, states not good for her kidneys    Iv Dye [Iodides]      Stage 4 kidney disease    Nsaids      CANNOT TAKE D/T KIDNEY DISEASE    Metformin And Related Hives, Itching and Rash     Stage 4 kidney disease.        Current Meds:   Scheduled Meds:    insulin glargine 30 Units SubCUTAneous BID    metOLazone  5 mg Oral Daily    bumetanide  2 mg IntraVENous BID    carvedilol  12.5 mg Oral BID    tamsulosin  0.4 mg Oral Daily    insulin lispro  0-16 Units SubCUTAneous TID     insulin lispro  0-4 Units SubCUTAneous Nightly    ipratropium-albuterol  1 ampule Inhalation Q4H WA    gabapentin  300 mg Oral Nightly    docusate sodium  100 mg Oral BID    [Held by provider] ranolazine  1,000 mg Oral Daily    [Held by provider] isosorbide dinitrate  10 mg Oral TID    aspirin  81 mg Oral Daily    atorvastatin  40 mg Oral Nightly    sodium bicarbonate  1,300 mg Oral BID    pantoprazole  40 mg Oral QAM AC    sodium chloride flush  5-40 mL IntraVENous 2 times per day    heparin (porcine)  5,000 Units SubCUTAneous 3 times per day    amLODIPine  10 mg Oral Daily     Continuous Infusions:    dextrose      sodium chloride       PRN Meds: guaiFENesin, benzocaine-menthol, melatonin, melatonin, glucose, dextrose bolus **OR** dextrose bolus, glucagon (rDNA), dextrose, sodium chloride flush, sodium chloride, potassium chloride **OR** potassium alternative oral replacement **OR** potassium chloride, magnesium sulfate, ondansetron **OR** ondansetron, polyethylene glycol, acetaminophen **OR** acetaminophen, labetalol, hydrALAZINE    Data:     Past Medical History:   has a past medical history of Arthritis, Backache, unspecified, Cerebral artery occlusion with cerebral infarction ([address] CHF (congestive heart failure) ([address] Chronic kidney disease, Coronary atherosclerosis of artery bypass graft, COVID, Cramp of limb, Gallstones, Hemodialysis patient ([address] Hyperlipidemia, Hypertension, Insomnia, Neuromuscular disorder ([address] Pneumonia, Psychiatric problem, Thyroid disease, Type II or unspecified type diabetes mellitus with renal manifestations, not stated as uncontrolled(250.40), Type II or unspecified type diabetes mellitus without mention of complication, not stated as uncontrolled, and Unspecified vitamin D deficiency. Social History:   reports that she has never smoked. She has never used smokeless tobacco. She reports that she does not drink alcohol and does not use drugs. Family History:   Family History   Problem Relation Age of Onset    Diabetes Father     Heart Failure Father        Vitals:  BP (!) 118/58   Pulse 59   Temp 97.9 °F (36.6 °C) (Oral)   Resp 12   Ht 5' 5\" (1.651 m)   Wt 248 lb 2.6 oz (112.6 kg)   SpO2 91%   BMI 41.30 kg/m²   Temp (24hrs), Av.9 °F (36.6 °C), Min:97.6 °F (36.4 °C), Max:98.4 °F (36.9 °C)    Recent Labs     23  0805 23  1123 02/03/23  16323  192   POCGLU 164* 353* 366* 339*         I/O (24Hr):     Intake/Output Summary (Last 24 hours) at 2023 0734  Last data filed at 2023 0556  Gross per 24 hour   Intake 100 ml   Output 3750 ml   Net -3650 ml         Labs:  Hematology:  Recent Labs     23  0642 23  0323 23  0312   WBC 5.6 5.9 5.4   RBC 3.67* 3.51* 3.69*   HGB 11.5* 11.1* 11.4*   HCT 36.1* 34.4* 36.5   MCV 98.4 98.0 98.9   MCH 31.3 31.6 30.9   MCHC 31.9 32.3 31.2   RDW 14.6* 14.3 14.0   * 144 145   MPV 12.0 12.0 12.2       Chemistry:  Recent Labs     23  0642 23  0323    138   K 4.9 4.6    99   CO2 28 29   GLUCOSE 163* 233*   BUN 69* 67*   CREATININE 3.05* 2.92*   ANIONGAP 11 10   LABGLOM 17* 17*   CALCIUM 8.9 8.9       Recent Labs     23  1623 23  1938 23  0805 23  1123 02/03/23  1639 23   POCGLU 276* 290* 164* 353* 366* 339*       ABG:  Lab Results   Component Value Date/Time    PHART 7.429 2022 01:42 PM    FOJ3XNW 45.9 2022 01:42 PM    PO2ART 47.6 2022 01:42 PM    SJQ9XUK 30.4 2022 01:42 PM    PBEA 6.1 2022 01:42 PM    Q4TTTUWY 82.8 2022 01:42 PM    FIO2 INFORMATION NOT PROVIDED 2023 10:59 AM     Lab Results   Component Value Date/Time    SPECIAL RIGHT HAND 2ML 2022 02:22 AM     Lab Results Component Value Date/Time    CULTURE NO SIGNIFICANT GROWTH 01/10/2023 02:14 PM       Radiology:  XR CHEST (2 VW)    Result Date: 1/24/2023  Findings suggest congestive heart failure The findings were sent to the Radiology Results [address] at 5:31 pm on 1/24/2023 to be communicated to a licensed caregiver. XR CHEST PORTABLE    Result Date: 1/25/2023  Appearance favoring worsening Neoma Donato vascular congestion/interstitial edema. Physical Examination:        General appearance:  alert, cooperative and no distress. Saturating well on 3 L oxygen via nasal cannula  Mental Status:  oriented to person, place and time and normal affect  HEENT: No significant tongue or throat throat edema. Lungs:  Normal effort. Normal bilateral air entry. Heart:  regular rate and rhythm, no murmur  Abdomen:  soft, nontender, nondistended, bowel sounds are present.   Extremities: 1+ pitting bilateral lower extremity edema, no redness, no tenderness in the calves  Skin:  no gross lesions, rashes, induration    Assessment:        Hospital Problems             Last Modified POA    * (Principal) Acute respiratory distress 1/25/2023 Yes    Type 2 diabetes mellitus with hyperglycemia, with long-term current use of insulin ([address] 1/25/2023 Yes    Hypertensive urgency 1/25/2023 Yes    Hypoxia 1/25/2023 Yes    Congestive heart failure ([address] 1/26/2023 Yes    Nephrotic range proteinuria 1/26/2023 Yes    Acute respiratory failure with hypoxia ([address] 2/1/2023 Yes    Atherosclerosis of coronary artery bypass graft of native heart without angina pectoris 1/25/2023 Yes    Acute on chronic diastolic heart failure ([address] 1/25/2023 Yes    Diabetic polyneuropathy associated with type 2 diabetes mellitus ([address] 1/25/2023 Yes    History of coronary artery bypass graft 1/25/2023 Yes    Mixed hyperlipidemia 1/25/2023 Yes    CKD (chronic kidney disease) stage 4, GFR 15-29 ml/min ([address] 1/31/2023 Yes    Hypertension, essential 2/1/2023 Yes    Morbid obesity with BMI of 40.0-44.9, adult ([facility] [address] 1/25/2023 Yes   Plan:        Laryngeal edema. Resolved. Will avoid isosorbide dinitrate and Ranexa at this time due to suspected anaphylaxis. Acute respiratory failure with hypoxia. Secondary to #3. Currently saturating well on 3 L nasal cannula. Weaning. Acute on chronic diastolic congestive heart failure-much better. .On oral  Bumex  and metolazone 5 mg daily. Appreciate nephrology recommendations. Elevated troponin likely from worsening renal function. Appreciate cardiology recommendations. Cardiology signed off    Hypertension. Continue amlodipine 10 mg daily, Coreg 12.5 mg twice daily     AURELIA on Stage IV CKD. Creatinine better. Creatinine improving. switched to Bumex oral 3 mg twice daily and continue metolazone. Type 2 diabetes mellitus. Fluctuating blood sugar. Patient is noncompliant with diet. Changed to Lantus to 40 units BID. High-dose corrective algorithm. POCT glucose and hypoglycemia protocol. Hyperlipidemia. Continue Lipitor 40 mg nightly. CAD status post CABG. last cath 2019 showed all patent grafts. Continue aspirin 81 mg daily, Lipitor 40 mg nightly, Coreg 12.5 mg twice daily. Morbid obesity. Encourage lifestyle modification with diet and exercise. DVT prophylaxis: Heparin 5000 units subcutaneously every 8 hours. GI prophylaxis: Protonix 40 mg daily. Discharge planning: Plan to discharge tomorrow to [address] once cleared by nephrology.       Verner Lesser, MD  2/4/2023  7:34 AM

## 2023-02-05 LAB
ABSOLUTE EOS #: 0.26 K/UL (ref 0–0.44)
ABSOLUTE IMMATURE GRANULOCYTE: 0.04 K/UL (ref 0–0.3)
ABSOLUTE LYMPH #: 0.82 K/UL (ref 1.1–3.7)
ABSOLUTE MONO #: 0.59 K/UL (ref 0.1–1.2)
ANION GAP SERPL CALCULATED.3IONS-SCNC: 12 MMOL/L (ref 9–17)
BASOPHILS # BLD: 1 % (ref 0–2)
BASOPHILS ABSOLUTE: 0.04 K/UL (ref 0–0.2)
BUN SERPL-MCNC: 70 MG/DL (ref 8–23)
CALCIUM SERPL-MCNC: 9.3 MG/DL (ref 8.6–10.4)
CHLORIDE SERPL-SCNC: 98 MMOL/L (ref 98–107)
CO2 SERPL-SCNC: 29 MMOL/L (ref 20–31)
CREAT SERPL-MCNC: 3.11 MG/DL (ref 0.5–0.9)
EOSINOPHILS RELATIVE PERCENT: 4 % (ref 1–4)
GFR SERPL CREATININE-BSD FRML MDRD: 16 ML/MIN/1.73M2
GLUCOSE BLD-MCNC: 157 MG/DL (ref 65–105)
GLUCOSE BLD-MCNC: 218 MG/DL (ref 65–105)
GLUCOSE BLD-MCNC: 287 MG/DL (ref 65–105)
GLUCOSE BLD-MCNC: 357 MG/DL (ref 65–105)
GLUCOSE SERPL-MCNC: 159 MG/DL (ref 70–99)
HCT VFR BLD AUTO: 35.8 % (ref 36.3–47.1)
HGB BLD-MCNC: 11.2 G/DL (ref 11.9–15.1)
IMMATURE GRANULOCYTES: 1 %
LYMPHOCYTES # BLD: 12 % (ref 24–43)
MCH RBC QN AUTO: 31 PG (ref 25.2–33.5)
MCHC RBC AUTO-ENTMCNC: 31.3 G/DL (ref 28.4–34.8)
MCV RBC AUTO: 99.2 FL (ref 82.6–102.9)
MONOCYTES # BLD: 9 % (ref 3–12)
NRBC AUTOMATED: 0 PER 100 WBC
PDW BLD-RTO: 13.8 % (ref 11.8–14.4)
PLATELET # BLD AUTO: 175 K/UL (ref 138–453)
PMV BLD AUTO: 12.1 FL (ref 8.1–13.5)
POTASSIUM SERPL-SCNC: 4 MMOL/L (ref 3.7–5.3)
RBC # BLD: 3.61 M/UL (ref 3.95–5.11)
SEG NEUTROPHILS: 73 % (ref 36–65)
SEGMENTED NEUTROPHILS ABSOLUTE COUNT: 4.94 K/UL (ref 1.5–8.1)
SODIUM SERPL-SCNC: 139 MMOL/L (ref 135–144)
WBC # BLD AUTO: 6.7 K/UL (ref 3.5–11.3)

## 2023-02-05 PROCEDURE — 6370000000 HC RX 637 (ALT 250 FOR IP): Performed by: NURSE PRACTITIONER

## 2023-02-05 PROCEDURE — 6360000002 HC RX W HCPCS: Performed by: STUDENT IN AN ORGANIZED HEALTH CARE EDUCATION/TRAINING PROGRAM

## 2023-02-05 PROCEDURE — 97116 GAIT TRAINING THERAPY: CPT

## 2023-02-05 PROCEDURE — 6370000000 HC RX 637 (ALT 250 FOR IP): Performed by: STUDENT IN AN ORGANIZED HEALTH CARE EDUCATION/TRAINING PROGRAM

## 2023-02-05 PROCEDURE — 6370000000 HC RX 637 (ALT 250 FOR IP)

## 2023-02-05 PROCEDURE — 6370000000 HC RX 637 (ALT 250 FOR IP): Performed by: CLINICAL NURSE SPECIALIST

## 2023-02-05 PROCEDURE — 97110 THERAPEUTIC EXERCISES: CPT

## 2023-02-05 PROCEDURE — 94640 AIRWAY INHALATION TREATMENT: CPT

## 2023-02-05 PROCEDURE — 82947 ASSAY GLUCOSE BLOOD QUANT: CPT

## 2023-02-05 PROCEDURE — 99232 SBSQ HOSP IP/OBS MODERATE 35: CPT | Performed by: STUDENT IN AN ORGANIZED HEALTH CARE EDUCATION/TRAINING PROGRAM

## 2023-02-05 PROCEDURE — 85025 COMPLETE CBC W/AUTO DIFF WBC: CPT

## 2023-02-05 PROCEDURE — 94761 N-INVAS EAR/PLS OXIMETRY MLT: CPT

## 2023-02-05 PROCEDURE — 97530 THERAPEUTIC ACTIVITIES: CPT

## 2023-02-05 PROCEDURE — 2580000003 HC RX 258: Performed by: STUDENT IN AN ORGANIZED HEALTH CARE EDUCATION/TRAINING PROGRAM

## 2023-02-05 PROCEDURE — 99232 SBSQ HOSP IP/OBS MODERATE 35: CPT | Performed by: INTERNAL MEDICINE

## 2023-02-05 PROCEDURE — 2060000000 HC ICU INTERMEDIATE R&B

## 2023-02-05 PROCEDURE — 2700000000 HC OXYGEN THERAPY PER DAY

## 2023-02-05 PROCEDURE — 36415 COLL VENOUS BLD VENIPUNCTURE: CPT

## 2023-02-05 PROCEDURE — 2500000003 HC RX 250 WO HCPCS: Performed by: NURSE PRACTITIONER

## 2023-02-05 PROCEDURE — 80048 BASIC METABOLIC PNL TOTAL CA: CPT

## 2023-02-05 RX ORDER — INSULIN LISPRO 100 [IU]/ML
15 INJECTION, SOLUTION INTRAVENOUS; SUBCUTANEOUS ONCE
Status: DISCONTINUED | OUTPATIENT
Start: 2023-02-05 | End: 2023-02-06 | Stop reason: HOSPADM

## 2023-02-05 RX ORDER — INSULIN GLARGINE 100 [IU]/ML
50 INJECTION, SOLUTION SUBCUTANEOUS 2 TIMES DAILY
Status: DISCONTINUED | OUTPATIENT
Start: 2023-02-05 | End: 2023-02-06 | Stop reason: HOSPADM

## 2023-02-05 RX ORDER — INSULIN GLARGINE 100 [IU]/ML
10 INJECTION, SOLUTION SUBCUTANEOUS ONCE
Status: COMPLETED | OUTPATIENT
Start: 2023-02-05 | End: 2023-02-05

## 2023-02-05 RX ADMIN — DOCUSATE SODIUM 100 MG: 100 CAPSULE ORAL at 08:47

## 2023-02-05 RX ADMIN — INSULIN LISPRO 16 UNITS: 100 INJECTION, SOLUTION INTRAVENOUS; SUBCUTANEOUS at 12:49

## 2023-02-05 RX ADMIN — BUMETANIDE 3 MG: 1 TABLET ORAL at 20:43

## 2023-02-05 RX ADMIN — INSULIN LISPRO 8 UNITS: 100 INJECTION, SOLUTION INTRAVENOUS; SUBCUTANEOUS at 17:40

## 2023-02-05 RX ADMIN — AMLODIPINE BESYLATE 10 MG: 10 TABLET ORAL at 08:48

## 2023-02-05 RX ADMIN — IPRATROPIUM BROMIDE AND ALBUTEROL SULFATE 1 AMPULE: 2.5; .5 SOLUTION RESPIRATORY (INHALATION) at 15:39

## 2023-02-05 RX ADMIN — TAMSULOSIN HYDROCHLORIDE 0.4 MG: 0.4 CAPSULE ORAL at 08:47

## 2023-02-05 RX ADMIN — SODIUM CHLORIDE, PRESERVATIVE FREE 10 ML: 5 INJECTION INTRAVENOUS at 09:09

## 2023-02-05 RX ADMIN — GABAPENTIN 300 MG: 300 CAPSULE ORAL at 20:44

## 2023-02-05 RX ADMIN — METOLAZONE 5 MG: 5 TABLET ORAL at 09:51

## 2023-02-05 RX ADMIN — BUMETANIDE 3 MG: 1 TABLET ORAL at 08:47

## 2023-02-05 RX ADMIN — Medication 10 MG: at 22:26

## 2023-02-05 RX ADMIN — ATORVASTATIN CALCIUM 40 MG: 80 TABLET, FILM COATED ORAL at 20:44

## 2023-02-05 RX ADMIN — SODIUM BICARBONATE 1300 MG: 648 TABLET ORAL at 20:44

## 2023-02-05 RX ADMIN — CARVEDILOL 12.5 MG: 12.5 TABLET, FILM COATED ORAL at 20:43

## 2023-02-05 RX ADMIN — HEPARIN SODIUM 5000 UNITS: 5000 INJECTION INTRAVENOUS; SUBCUTANEOUS at 17:38

## 2023-02-05 RX ADMIN — IPRATROPIUM BROMIDE AND ALBUTEROL SULFATE 1 AMPULE: 2.5; .5 SOLUTION RESPIRATORY (INHALATION) at 12:16

## 2023-02-05 RX ADMIN — CARVEDILOL 12.5 MG: 12.5 TABLET, FILM COATED ORAL at 08:47

## 2023-02-05 RX ADMIN — INSULIN GLARGINE 50 UNITS: 100 INJECTION, SOLUTION SUBCUTANEOUS at 20:42

## 2023-02-05 RX ADMIN — PANTOPRAZOLE SODIUM 40 MG: 40 TABLET, DELAYED RELEASE ORAL at 06:58

## 2023-02-05 RX ADMIN — SODIUM CHLORIDE, PRESERVATIVE FREE 10 ML: 5 INJECTION INTRAVENOUS at 09:51

## 2023-02-05 RX ADMIN — SODIUM CHLORIDE, PRESERVATIVE FREE 10 ML: 5 INJECTION INTRAVENOUS at 20:44

## 2023-02-05 RX ADMIN — HEPARIN SODIUM 5000 UNITS: 5000 INJECTION INTRAVENOUS; SUBCUTANEOUS at 08:53

## 2023-02-05 RX ADMIN — INSULIN GLARGINE 40 UNITS: 100 INJECTION, SOLUTION SUBCUTANEOUS at 08:46

## 2023-02-05 RX ADMIN — INSULIN GLARGINE 10 UNITS: 100 INJECTION, SOLUTION SUBCUTANEOUS at 12:49

## 2023-02-05 RX ADMIN — ASPIRIN 81 MG: 81 TABLET, CHEWABLE ORAL at 08:47

## 2023-02-05 RX ADMIN — GUAIFENESIN 200 MG: 200 SOLUTION ORAL at 20:43

## 2023-02-05 RX ADMIN — SODIUM BICARBONATE 1300 MG: 648 TABLET ORAL at 08:47

## 2023-02-05 RX ADMIN — DOCUSATE SODIUM 100 MG: 100 CAPSULE ORAL at 20:43

## 2023-02-05 RX ADMIN — IPRATROPIUM BROMIDE AND ALBUTEROL SULFATE 1 AMPULE: 2.5; .5 SOLUTION RESPIRATORY (INHALATION) at 08:07

## 2023-02-05 NOTE — PLAN OF CARE
Problem: Respiratory - Adult  Goal: Achieves optimal ventilation and oxygenation  2/5/2023 0821 by Pasquale Lucas RCP  Outcome: Progressing   PROVIDE ADEQUATE OXYGENATION WITH ACCEPTABLE SP02/ABG'S    [x]  IDENTIFY APPROPRIATE OXYGEN THERAPY  [x]   MONITOR SP02/ABG'S AS NEEDED   [x]   PATIENT EDUCATION AS NEEDED   BRONCHOSPASM/BRONCHOCONSTRICTION     [x]         IMPROVE AERATION/BREATH SOUNDS  [x]   ADMINISTER BRONCHODILATOR THERAPY AS APPROPRIATE  [x]   ASSESS BREATH SOUNDS  []   IMPLEMENT AEROSOL/MDI PROTOCOL  [x]   PATIENT EDUCATION AS NEEDED

## 2023-02-05 NOTE — PROGRESS NOTES
[facility]  Office: [address], DO, Amanda Jnoes, DO, Nelida Wilson, DO, Massiel Gill, DO, Nathan Denson MD, Jennifer Zhong MD, Florencia Presley MD, Shamar Lopes MD,  Jani Teixeira MD, Giovanna Johns MD, José Antonio Guadalupe, DO, Mariaa Sykes MD,  Luz Sanchez MD, Manoj Felix MD, Theresa Mac DO, Luisa Cunningham MD, Mercy Oliver MD, Arslan Farias DO, Franklin Linares MD, Alysa Cm MD, Sulaiman Nathan MD, Mckay Arrieta MD, Cassia Joy DO, Ofelia Dickerson MD, Migdalia Salamanca MD, Berenice Esquivel, Hafsa Kelley, CNP, Mitali Beth, CNP, Ruthy Sanchez, CNP,  Milly Das, [facility], Khris Howell, CNP, Delia Chappell, CNP, Israel Weiss, CNP, Junie Lamar, CNP, Emmanuel Bocanegra, CNP, Ashley Vital PA-C, Rachelle Chiu, CNS, Tonja Zapata, CNP, Ida Gonzalez CNP         Enrike Rodgers 19    Progress Note    2/5/2023    7:58 AM    Name:   Enrike Espinoza  MRN:     [id number]     Acct:      [de-identified]   Room:   [address] [place] [address] Day:  11  Admit Date:  1/25/2023 10:13 AM    PCP:   AFSANEH Bragg CNP  Code Status:  Full Code    Subjective:     C/C:   Chief Complaint   Patient presents with    Shortness of Breath    Weight Gain     Interval History Status: improved. Hemodynamically stable. Saturating well on 3 L oxygen. Weaning off  Creatinine slightly worse. Continued on oral Bumex. Net -19 L since admission. Discharge to facility once cleared by nephrology. Brief History:     Enrike Espinoza is a 57 y.o. Non- / non  female who presents with Shortness of Breath and Weight Gain   and is admitted to the hospital for the management of Acute respiratory distress. Complicated by AURELIA on CKD.      57year-old female with known medical history of diastolic heart failure, CKD, hypertension, hyperlipidemia, type 2 diabetes mellitus, coronary artery disease with history of CABG presents to the hospital with worsening shortness of breath and leg swelling for last 1 month. Patient states that she was on dialysis after she got contrast for a CT PE scan and was recently taken off dialysis in August 2022 by Dr. Sravani Curran. Her kidney numbers have remained stable after getting off dialysis. Patient reports that she started noticing increasing swelling of her lower extremities and increasing shortness of breath on minimal exertion. Patient followed up with her PCP who advised patient to get chest x-ray done which showed congestion. Patient was then asked by PCP to come to the hospital.     In the ER patient was noted to be hypertensive with blood pressure 186/88, she was hypoxic saturating 78% on room air. She was initially started on nonrebreather and then switched to BiPAP. On my examination patient was on BiPAP. She was also started on nitro drip for hypertension and pulmonary edema. Her creatinine is around 2.12. Troponin of 82 and 83. Stay her respiratory status got better. She stated on 3 L nasal telemetry throughout stay. Her creatinine got better with Lasix drip. Was later switched to IV Bumex. Currently on oral Bumex. Has net -19 L out since admission. Okay to be discharged to facility once cleared by nephrology. Review of Systems:     Constitutional:  negative for chills, fevers, sweats  Respiratory:  Neg shortness of breath. Negative for wheezing, throat swelling. Cardiovascular: Positive for 1 +lower extremity edema. Negative for chest pain, palpitations  Gastrointestinal:  negative for abdominal pain, constipation, diarrhea, nausea, vomiting  Neurological:  negative for dizziness, headache    Medications: Allergies:     Allergies   Allergen Reactions    Adhesive Tape Other (See Comments) and Rash     Other reaction(s): Unknown    Isosorbide Dinitrate Angioedema    Ranexa [Ranolazine] Angioedema    Metformin And Related Diarrhea    Ace Inhibitors Other (See Comments)     Cough, states not good for her kidneys    Iv Dye [Iodides]      Stage 4 kidney disease    Nsaids      CANNOT TAKE D/T KIDNEY DISEASE    Metformin And Related Hives, Itching and Rash     Stage 4 kidney disease.        Current Meds:   Scheduled Meds:    bumetanide  3 mg Oral BID    insulin glargine  40 Units SubCUTAneous BID    metOLazone  5 mg Oral Daily    carvedilol  12.5 mg Oral BID    tamsulosin  0.4 mg Oral Daily    insulin lispro  0-16 Units SubCUTAneous TID WC    insulin lispro  0-4 Units SubCUTAneous Nightly    ipratropium-albuterol  1 ampule Inhalation Q4H WA    gabapentin  300 mg Oral Nightly    docusate sodium  100 mg Oral BID    [Held by provider] ranolazine  1,000 mg Oral Daily    [Held by provider] isosorbide dinitrate  10 mg Oral TID    aspirin  81 mg Oral Daily    atorvastatin  40 mg Oral Nightly    sodium bicarbonate  1,300 mg Oral BID    pantoprazole  40 mg Oral QAM AC    sodium chloride flush  5-40 mL IntraVENous 2 times per day    heparin (porcine)  5,000 Units SubCUTAneous 3 times per day    amLODIPine  10 mg Oral Daily     Continuous Infusions:    dextrose      sodium chloride       PRN Meds: guaiFENesin, benzocaine-menthol, melatonin, melatonin, glucose, dextrose bolus **OR** dextrose bolus, glucagon (rDNA), dextrose, sodium chloride flush, sodium chloride, potassium chloride **OR** potassium alternative oral replacement **OR** potassium chloride, magnesium sulfate, ondansetron **OR** ondansetron, polyethylene glycol, acetaminophen **OR** acetaminophen, labetalol, hydrALAZINE    Data:     Past Medical History:   has a past medical history of Arthritis, Backache, unspecified, Cerebral artery occlusion with cerebral infarction ([facility] CHF (congestive heart failure) ([facility] [facility] Chronic kidney disease, Coronary atherosclerosis of artery bypass graft, COVID, Cramp of limb, Gallstones, Hemodialysis patient ([facility] [address] [facility] Hyperlipidemia, Hypertension, Insomnia, Neuromuscular disorder ([facility] [address] [facility] Pneumonia, Psychiatric problem, Thyroid disease, Type II or unspecified type diabetes mellitus with renal manifestations, not stated as uncontrolled(250.40), Type II or unspecified type diabetes mellitus without mention of complication, not stated as uncontrolled, and Unspecified vitamin D deficiency. Social History:   reports that she has never smoked. She has never used smokeless tobacco. She reports that she does not drink alcohol and does not use drugs. Family History:   Family History   Problem Relation Age of Onset    Diabetes Father     Heart Failure Father        Vitals:  BP (!) 131/56   Pulse 62   Temp 98 °F (36.7 °C) (Oral)   Resp 14   Ht 5' 5\" (1.651 m)   Wt 248 lb 2.6 oz (112.6 kg)   SpO2 94%   BMI 41.30 kg/m²   Temp (24hrs), Av.5 °F (36.9 °C), Min:98 °F (36.7 °C), Max:98.9 °F (37.2 °C)    Recent Labs     23  1653 23  1901 23  2112 23  0735   POCGLU 364* 332* 272* 157*         I/O (24Hr):     Intake/Output Summary (Last 24 hours) at 2023 0758  Last data filed at 2023 0430  Gross per 24 hour   Intake 480 ml   Output 3500 ml   Net -3020 ml         Labs:  Hematology:  Recent Labs     23  0323 23  0312 23  0320   WBC 5.9 5.4 6.7   RBC 3.51* 3.69* 3.61*   HGB 11.1* 11.4* 11.2*   HCT 34.4* 36.5 35.8*   MCV 98.0 98.9 99.2   MCH 31.6 30.9 31.0   MCHC 32.3 31.2 31.3   RDW 14.3 14.0 13.8    145 175   MPV 12.0 12.2 12.1       Chemistry:  Recent Labs     23  0323 23  0830 23  0320    138 139   K 4.6 4.8 4.0   CL 99 98 98   CO2 29 28 29   GLUCOSE 233* 219* 159*   BUN 67* 64* 70*   CREATININE 2.92* 2.59* 3.11*   ANIONGAP 10 12 12   LABGLOM 17* 20* 16*   CALCIUM 8.9 9.3 9.3       Recent Labs     23  1107 23  1203 23  1653 23  1901 23  2112 23  0735   POCGLU 417* 405* 364* 332* 272* 157*       ABG:  Lab Results   Component Value Date/Time    PHART 7.429 2022 01:42 PM    KFF3VJB 45.9 2022 01:42 PM    PO2ART 47.6 04/24/2022 01:42 PM    RCB0HOL 30.4 04/24/2022 01:42 PM    PBEA 6.1 04/24/2022 01:42 PM    K4CLHNDX 82.8 04/24/2022 01:42 PM    FIO2 INFORMATION NOT PROVIDED 01/25/2023 10:59 AM     Lab Results   Component Value Date/Time    SPECIAL RIGHT HAND 2ML 08/03/2022 02:22 AM     Lab Results   Component Value Date/Time    CULTURE NO SIGNIFICANT GROWTH 01/10/2023 02:14 PM       Radiology:  XR CHEST (2 VW)    Result Date: 1/24/2023  Findings suggest congestive heart failure The findings were sent to the Radiology Results [address] at 5:31 pm on 1/24/2023 to be communicated to a licensed caregiver. XR CHEST PORTABLE    Result Date: 1/25/2023  Appearance favoring worsening Lesleigh Bird vascular congestion/interstitial edema. Physical Examination:        General appearance:  alert, cooperative and no distress. Saturating well on 3 L oxygen via nasal cannula  Mental Status:  oriented to person, place and time and normal affect  HEENT: No significant tongue or throat throat edema. Lungs:  Normal effort. Normal bilateral air entry. Heart:  regular rate and rhythm, no murmur  Abdomen:  soft, nontender, nondistended, bowel sounds are present.   Extremities: 1+ pitting bilateral lower extremity edema, no redness, no tenderness in the calves  Skin:  no gross lesions, rashes, induration    Assessment:        Hospital Problems             Last Modified POA    * (Principal) Acute respiratory distress 1/25/2023 Yes    Type 2 diabetes mellitus with hyperglycemia, with long-term current use of insulin ([address] 1/25/2023 Yes    Hypertensive urgency 1/25/2023 Yes    Hypoxia 1/25/2023 Yes    Congestive heart failure ([address] 1/26/2023 Yes    Nephrotic range proteinuria 1/26/2023 Yes    Acute respiratory failure with hypoxia ([address] 2/1/2023 Yes    Atherosclerosis of coronary artery bypass graft of native heart without angina pectoris 1/25/2023 Yes    Acute on chronic diastolic heart failure ([address] 1/25/2023 Yes Diabetic polyneuropathy associated with type 2 diabetes mellitus ([facility] 1/25/2023 Yes    History of coronary artery bypass graft 1/25/2023 Yes    Mixed hyperlipidemia 1/25/2023 Yes    CKD (chronic kidney disease) stage 4, GFR 15-29 ml/min ([facility] 1/31/2023 Yes    Hypertension, essential 2/1/2023 Yes    Morbid obesity with BMI of 40.0-44.9, adult ([facility] 1/25/2023 Yes   Plan:        Laryngeal edema. Resolved. Will avoid isosorbide dinitrate and Ranexa at this time due to suspected anaphylaxis. Acute respiratory failure with hypoxia. Secondary to #3. Currently saturating well on 3 L nasal cannula. Weaning. Home O2 eval at time of discharge. Outpatient sleep study. Acute on chronic diastolic congestive heart failure-much better. .on oral  Bumex  and metolazone 5 mg daily. Appreciate nephrology recommendations. Hypertension. Continue amlodipine 10 mg daily, Coreg 12.5 mg twice daily     AURELIA on Stage IV CKD. Creatinine little worse today. On oral Bumex and metolazone. Good urine output. Continue daily BMP. Intake output. Type 2 diabetes mellitus. Blood sugar better this morning. Continue fl Lantus to 40 units BID. High-dose corrective algorithm. POCT glucose and hypoglycemia protocol. Advised compliance with diabetic diet    Hyperlipidemia. Continue Lipitor 40 mg nightly. CAD status post CABG. last cath 2019 showed all patent grafts. Continue aspirin 81 mg daily, Lipitor 40 mg nightly, Coreg 12.5 mg twice daily. Morbid obesity. Encourage lifestyle modification with diet and exercise. DVT prophylaxis: Heparin 5000 units subcutaneously every 8 hours. GI prophylaxis: Protonix 40 mg daily. Discharge planning: Plan to discharge to [facility] once cleared by nephrology.       Godfrey Navarro MD  2/5/2023  7:58 AM

## 2023-02-05 NOTE — PROGRESS NOTES
Physical Therapy  Facility/Department: [address] STEPDOWN  Physical Therapy Daily Treatment Note    Name: Zander Cha  : 1960  MRN: [id number]  Date of Service: 2023    Discharge Recommendations:  Patient would benefit from continued therapy after discharge   PT Equipment Recommendations  Equipment Needed: No      Patient Diagnosis(es): The primary encounter diagnosis was Congestive heart failure, unspecified HF chronicity, unspecified heart failure type ([address] A diagnosis of Hypoxia was also pertinent to this visit. Past Medical History:  has a past medical history of Arthritis, Backache, unspecified, Cerebral artery occlusion with cerebral infarction ([address] CHF (congestive heart failure) ([address] Chronic kidney disease, Coronary atherosclerosis of artery bypass graft, COVID, Cramp of limb, Gallstones, Hemodialysis patient ([address] Hyperlipidemia, Hypertension, Insomnia, Neuromuscular disorder ([facility] Pneumonia, Psychiatric problem, Thyroid disease, Type II or unspecified type diabetes mellitus with renal manifestations, not stated as uncontrolled(250.40), Type II or unspecified type diabetes mellitus without mention of complication, not stated as uncontrolled, and Unspecified vitamin D deficiency. Past Surgical History:  has a past surgical history that includes Coronary artery bypass graft; Knee arthroscopy; Carpal tunnel release; Breast surgery; Tonsillectomy; Hand surgery; Ankle fracture surgery; Cholecystectomy, open (N/A); IR TUNNELED CVC PLACE WO SQ PORT/PUMP > 5 YEARS (2021); AV fistula creation (2021); Dialysis fistula creation (Left, 2021); other surgical history (2022); back surgery; Colonoscopy; eye surgery; fracture surgery; and Cardiac surgery. Assessment   Body Structures, Functions, Activity Limitations Requiring Skilled Therapeutic Intervention: Decreased functional mobility ; Decreased strength;Decreased endurance;Decreased balance  Assessment: Pt ambulated 60 ft with RW CGA/Gigi on 3L NC. Pt most limited by decreased endurance and mild balance deficits. Pt would benefit from continued PT to address deficits, at this time pt would require 24hr assistance for mobility. Therapy Prognosis: Good  Requires PT Follow-Up: Yes  Activity Tolerance  Activity Tolerance: Patient limited by endurance; Patient tolerated treatment well     Plan   Physcial Therapy Plan  General Plan: 5-7 times per week  Current Treatment Recommendations: Strengthening, Balance training, Transfer training, Functional mobility training, Home exercise program, Equipment evaluation, education, & procurement, Patient/Caregiver education & training, Safety education & training, Therapeutic activities, Endurance training, Gait training  Safety Devices  Type of Devices: Gait belt, Call light within reach, Nurse notified, All fall risk precautions in place, Left in chair  Restraints  Restraints Initially in Place: No     Restrictions  Restrictions/Precautions  Restrictions/Precautions: Fall Risk  Required Braces or Orthoses?: No  Position Activity Restriction  Other position/activity restrictions: Up with Assist.  Monitor SpO2. Subjective   Pain: pt c/o pain in B LEs, L foot greater than R. Does not rate  General  Chart Reviewed: Yes  Patient assessed for rehabilitation services?: Yes  Response To Previous Treatment: Patient with no complaints from previous session. Follows Commands: Within Functional Limits  General Comment  Comments: Pt left seated in recliner with call light within reach  Subjective  Subjective: Pt and RN agreeable to PT. pt resting in bed upon arrival, very pleasant and cooperative with treatment. Pt denies pain at rest       Cognition   Orientation  Overall Orientation Status: Within Functional Limits  Orientation Level: Oriented X4  Cognition  Overall Cognitive Status: WFL  Arousal/Alertness: Appropriate responses to stimuli  Following Commands:  Follows multistep commands with increased time;Follows one step commands consistently; Follows multistep commands with repitition  Attention Span: Appears intact  Memory: Decreased long term memory  Safety Judgement: Decreased awareness of need for assistance  Problem Solving: Assistance required to generate solutions;Assistance required to identify errors made  Insights: Fully aware of deficits  Initiation: Requires cues for some  Sequencing: Does not require cues     Objective   Heart Rate: 61  Heart Rate Source: Monitor  SpO2: 92 %  O2 Device: Nasal cannula     Observation/Palpation  Posture: Good     Bed mobility  Supine to Sit: Minimal assistance (HHA)  Sit to Supine:  (pt left seated in recliner)  Scooting: Contact guard assistance  Bed Mobility Comments: HOB elevated, increased time to complete  Transfers  Sit to Stand: Minimal Assistance  Stand to Sit: Contact guard assistance  Comment: Assessed to RW with good demonstration of UE placement  Ambulation  Surface: Level tile  Device: Rolling Walker  Other Apparatus: O2  Assistance: Contact guard assistance;Minimal assistance (initially CGA, pt became very fatigued with remaining 20ft of amb requiring Jose for balance and RW navigation)  Quality of Gait: fair stability, decreased stride length, no LOB  Gait Deviations: Slow Annie;Decreased step length;Decreased step height  Distance: 30ft x2  Comments: brief standing rest break required between ambulations, noted B LE weakness with increased distance  More Ambulation?: No  Stairs/Curb  Stairs?: No     Balance  Posture: Good  Sitting - Static: Good  Sitting - Dynamic: Good  Standing - Static: Fair;+  Standing - Dynamic: Fair  Comments: standing balance assessed while using a RW  Exercise Treatment:   Seated LE exercise program: Long Arc Quads, hip abduction/adduction, heel/toe raises, and marches.  Reps: 20x    AM-PAC Score  AM-PAC Inpatient Mobility Raw Score : 16 (02/05/23 1540)  AM-PAC Inpatient T-Scale Score : 40.78 (02/05/23 1540)  Mobility Inpatient CMS 0-100% Score: 54.16 (02/05/23 1540)  Mobility Inpatient CMS G-Code Modifier : CK (02/05/23 1540)      Goals  Short Term Goals  Time Frame for Short Term Goals: 14 visits  Short Term Goal 1: Pt will ambulate 150 feet with a RW and supervision to increase functional independence. Short Term Goal 2: Pt will demonstrate good- dynamic standing balance to decrease fall risk. Short Term Goal 3: Pt will perform sit<>stand transfer independently. Short Term Goal 4: Pt will tolerate a 35 minute therapy session to promote increased endurance. Short Term Goal 5: Pt will perform supine<>sit transfer with supervision  Additional Goals?: No       Education  Patient Education  Education Given To: Patient  Education Provided: Role of Therapy;Transfer Training;Plan of Care; Fall Prevention Strategies  Education Method: Demonstration;Verbal  Barriers to Learning: None  Education Outcome: Verbalized understanding;Demonstrated understanding      Therapy Time   Individual Concurrent Group Co-treatment   Time In 8262         Time Out 1124         Minutes 41         Timed Code Treatment Minutes: [place], [place]

## 2023-02-05 NOTE — PLAN OF CARE
Problem: Discharge Planning  Goal: Discharge to home or other facility with appropriate resources  2/4/2023 2312 by Ryan Sarmiento RN  Outcome: Progressing  2/4/2023 1933 by Britta Cevallos RN  Outcome: Progressing

## 2023-02-05 NOTE — PROGRESS NOTES
Nephrology Progress Note      SUBJECTIVE      Patient seen and examined bedside  No acute issues overnight  Urine output over the last 24 hours 3.5 L. Lab work reviewed. Normokalemia. Hemoglobin 11.2. Bumex changed to 3 mg IV 3 times daily plus metolazone. Kidney function seems to have worsened from 2.5-3.1 overnight. Discharge planning to SNF, got accepted, pre-CERT good over the weekend      HPI -   This is a 57 y.o. female who presented to the hospital for evaluation of increasing edema, increasing weight gain of about 8 to 9 pounds, and a progressive exertional dyspnea. The symptoms started about 8 or 9 days ago, she did seek medical attention the PCP who ordered a chest x-ray and was concerned about some congestive heart failure. Patient was contacted and advised to come to the hospital.     She is an office patient of Dr. Del Davis. Previously she has had acute on chronic renal injury and ended up on dialysis for few months secondary likely to contrast associated renal injury and as of August 2022 she has been off dialysis. Her serum creatinine seems to be hovering around 2.2 -2.4 range. At home she typically takes furosemide twice daily (120 mg twice a day). She tells me she has been very compliant in regards to her that medication use. She does not give any history of recent change in her diet, increasing salt intake, noncompliance to medications, does watch her fluids at home as well. She is going having some exertional dyspnea along with. She denies any palpitations or any episodes of syncope. She did not give any history of orthopnea. She denies any NSAID use. Patient has had a longstanding history of diabetes mellitus type 2. Her CKD was originally thought to be related to diabetic renal disease. She does also have history of essential hypertension, dyslipidemia, diastolic CHF.   She was last evaluated in the office approximately 4 to 5 weeks ago at which time her physical exam and her labs were actually relatively stable. Admission chest x-ray showed evidence of vascular congestion  Last cardiac echo from 2022 showed evidence of mild diastolic dysfunction, LV function preserved at greater than 55%, moderate left ventricular hypertrophy. On review of most recent urine studies, she seems to have developed worsening proteinuria. Her proteinuria last year was quantified somewhere around 2.5 g and most recent studies indicate proteinuria to be almost at 5 g. She denies any history of diabetic retinopathy but does admit to symptoms of neuropathy. Pt denies any hx of heavy or prolonged NSAID use. There is no history of blood or bone marrow disorders. There is no hx of jaundice or hepatitis or sexually transmitted disease. Pt has no hx of collagen vascular disease or vasculitis. There is no hx of paraprotein disease. No hx of recurrent UTI , incontinence or recurrent nephrolithiasis. Started on IV Lasix infusion on 2023, stop Lasix infusion on 2023, started on Bumex IV 2 mg twice daily.     OBJECTIVE      CURRENT TEMPERATURE:  Temp: 98 °F (36.7 °C)  MAXIMUM TEMPERATURE OVER 24HRS:  Temp (24hrs), Av.5 °F (36.9 °C), Min:98 °F (36.7 °C), Max:98.9 °F (37.2 °C)    CURRENT RESPIRATORY RATE:  Resp: 15  CURRENT PULSE:  Heart Rate: 63  CURRENT BLOOD PRESSURE:  BP: (!) 132/52  24HR BLOOD PRESSURE RANGE:  Systolic (48ODI), HUZ:224 , Min:120 , VJK:457   ; Diastolic (98OUM), TMA:62, Min:52, Max:76    24HR INTAKE/OUTPUT:    Intake/Output Summary (Last 24 hours) at 2023 0950  Last data filed at 2023 0430  Gross per 24 hour   Intake 480 ml   Output 3500 ml   Net -3020 ml         PHYSICAL EXAM      GENERAL APPEARANCE:Awake, alert, in no acute distress  SKIN: warm and dry, no rash or erythema  EYES: conjunctivae normal and sclera anicteric  NECK:  JVD: None   PULMONARY: Clear on auscultation  CADRDIOVASCULAR: Normal heart sounds  ABDOMEN: Soft, nontender  EXTREMITIES: Pedal edema much improved    CURRENT MEDICATIONS      bumetanide (BUMEX) tablet 3 mg, BID  insulin glargine (LANTUS) injection vial 40 Units, BID  guaiFENesin (ROBITUSSIN) 100 MG/5ML liquid 200 mg, Q4H PRN  metOLazone (ZAROXOLYN) tablet 5 mg, Daily  carvedilol (COREG) tablet 12.5 mg, BID  tamsulosin (FLOMAX) capsule 0.4 mg, Daily  insulin lispro (HUMALOG) injection vial 0-16 Units, TID WC  insulin lispro (HUMALOG) injection vial 0-4 Units, Nightly  ipratropium-albuterol (DUONEB) nebulizer solution 1 ampule, Q4H WA  benzocaine-menthol (CEPACOL SORE THROAT) lozenge 1 lozenge, Q2H PRN  gabapentin (NEURONTIN) capsule 300 mg, Nightly  melatonin tablet 3 mg, Nightly PRN  melatonin tablet 10 mg, Nightly PRN  glucose chewable tablet 16 g, PRN  dextrose bolus 10% 125 mL, PRN   Or  dextrose bolus 10% 250 mL, PRN  glucagon (rDNA) injection 1 mg, PRN  dextrose 10 % infusion, Continuous PRN  docusate sodium (COLACE) capsule 100 mg, BID  [Held by provider] ranolazine (RANEXA) extended release tablet 1,000 mg, Daily  [Held by provider] isosorbide dinitrate (ISORDIL) tablet 10 mg, TID  aspirin chewable tablet 81 mg, Daily  atorvastatin (LIPITOR) tablet 40 mg, Nightly  sodium bicarbonate tablet 1,300 mg, BID  pantoprazole (PROTONIX) tablet 40 mg, QAM AC  sodium chloride flush 0.9 % injection 5-40 mL, 2 times per day  sodium chloride flush 0.9 % injection 10 mL, PRN  0.9 % sodium chloride infusion, PRN  potassium chloride (KLOR-CON M) extended release tablet 40 mEq, PRN   Or  potassium bicarb-citric acid (EFFER-K) effervescent tablet 40 mEq, PRN   Or  potassium chloride 10 mEq/100 mL IVPB (Peripheral Line), PRN  magnesium sulfate 1000 mg in dextrose 5% 100 mL IVPB, PRN  ondansetron (ZOFRAN-ODT) disintegrating tablet 4 mg, Q8H PRN   Or  ondansetron (ZOFRAN) injection 4 mg, Q6H PRN  polyethylene glycol (GLYCOLAX) packet 17 g, Daily PRN  acetaminophen (TYLENOL) tablet 650 mg, Q6H PRN   Or  acetaminophen (TYLENOL) suppository 650 mg, Q6H PRN  heparin (porcine) injection 5,000 Units, 3 times per day  labetalol (NORMODYNE;TRANDATE) injection 10 mg, Q6H PRN  amLODIPine (NORVASC) tablet 10 mg, Daily  hydrALAZINE (APRESOLINE) injection 10 mg, Q6H PRN        LABS      CBC:   Recent Labs     02/03/23  0323 02/04/23  0312 02/05/23  0320   WBC 5.9 5.4 6.7   RBC 3.51* 3.69* 3.61*   HGB 11.1* 11.4* 11.2*   HCT 34.4* 36.5 35.8*   MCV 98.0 98.9 99.2   MCH 31.6 30.9 31.0   MCHC 32.3 31.2 31.3   RDW 14.3 14.0 13.8    145 175   MPV 12.0 12.2 12.1        BMP:   Recent Labs     02/03/23  0323 02/04/23  0830 02/05/23  0320    138 139   K 4.6 4.8 4.0   CL 99 98 98   CO2 29 28 29   BUN 67* 64* 70*   CREATININE 2.92* 2.59* 3.11*   GLUCOSE 233* 219* 159*   CALCIUM 8.9 9.3 9.3        BNP:  Lab Results   Component Value Date/Time    BNP 41 04/09/2015 12:00 AM     IRON:    Lab Results   Component Value Date/Time    IRON 83 09/27/2022 01:32 PM     IRON SATURATION:    Lab Results   Component Value Date/Time    LABIRON 38 09/27/2022 01:32 PM     TIBC:    Lab Results   Component Value Date/Time    TIBC 217 09/27/2022 01:32 PM     FERRITIN:    Lab Results   Component Value Date/Time    FERRITIN 117 09/27/2022 01:32 PM     RUSSELL:   Lab Results   Component Value Date    RUSSELL NEGATIVE 08/03/2022       SPEP:   Lab Results   Component Value Date/Time    PROT 7.0 12/01/2022 10:25 AM     UPEP:   Lab Results   Component Value Date/Time    TPU 20 11/12/2021 10:30 AM      HEPBSAG:  Lab Results   Component Value Date/Time    HEPBSAG NONREACTIVE 03/30/2022 03:30 PM     HEPCAB:  Lab Results   Component Value Date/Time    HEPCAB NONREACTIVE 03/30/2022 03:30 PM     URINE SODIUM:    Lab Results   Component Value Date/Time    JASON 47 11/14/2021 02:04 AM     URINE CREATININE:    Lab Results   Component Value Date/Time    LABCREA 65.4 01/26/2023 01:10 PM     URINE PROTEIN:    Lab Results   Component Value Date/Time    TPU 20 11/12/2021 10:30 AM     URINALYSIS:  U/A:   Lab Results Component Value Date/Time    NITRU NEGATIVE 01/10/2023 02:14 PM    COLORU Yellow 01/10/2023 02:14 PM    PHUR 7.0 01/10/2023 02:14 PM    WBCUA 2 TO 5 01/10/2023 02:14 PM    RBCUA 0 TO 2 01/10/2023 02:14 PM    MUCUS 2+ 07/31/2022 12:00 PM    TRICHOMONAS NOT REPORTED 11/14/2021 02:05 AM    YEAST FEW 07/02/2022 09:10 AM    BACTERIA FEW 01/10/2023 02:14 PM    SPECGRAV 1.021 01/10/2023 02:14 PM    LEUKOCYTESUR SMALL 01/10/2023 02:14 PM    UROBILINOGEN Normal 01/10/2023 02:14 PM    BILIRUBINUR NEGATIVE 01/10/2023 02:14 PM    GLUCOSEU NEGATIVE 01/10/2023 02:14 PM    KETUA NEGATIVE 01/10/2023 02:14 PM    AMORPHOUS 2+ 07/31/2022 12:00 PM     RADIOLOGY    Reviewed as available. ASSESSMENT      1. CKD 4 likely due to underlying diabetic renal involvement. Was on dialysis for several months, got off of dialysis about 5-1/2 months ago and later taken off of HD. Her creatinine currently seems to be running around high 2's to low 3's.    2.  Acute on chronic diastolic CHF  3. Hypertensive urgency  3. Acute on chronic hypoxic respiratory failure  4. Diabetes mellitus type 2  5. CAD s/p CABG, last cath in October 2019  6. Essential hypertension  7. Obesity  8. History of DVT    PLAN      1. Change Bumex IV to oral 3 mg twice daily, continue metolazone 5 mg every other day  2. Strict I's and O's, daily weights  3. Continue to hold on starting ARB for now  4. BMP in the a.m.  5.  Will follow    Electronically signed by Bhakti Mitchell CNP, APRN - CNP on 2/5/2023 at 9:52 AM   Nephrology [address] [place] [address]     Attending Physician Statement  I have discussed the care of Maria R Timmons, including pertinent history and exam findings,  with the CNP. I have reviewed the key elements of all parts of the encounter with the CNP. I agree with the assessment, plan and orders as documented.     Isela Bhatia MD MD, MRCP Cammie Burton, FACP   2/5/2023 3:04 PM    Nephrology [address] [place] [address] Drive

## 2023-02-05 NOTE — PLAN OF CARE
Problem: Discharge Planning  Goal: Discharge to home or other facility with appropriate resources  Outcome: Progressing     Problem: Pain  Goal: Verbalizes/displays adequate comfort level or baseline comfort level  Outcome: Progressing     Problem: Chronic Conditions and Co-morbidities  Goal: Patient's chronic conditions and co-morbidity symptoms are monitored and maintained or improved  Outcome: Progressing     Problem: Respiratory - Adult  Goal: Achieves optimal ventilation and oxygenation  2/4/2023 1933 by Emily Nuñez RN  Outcome: Progressing     Problem: Skin/Tissue Integrity  Goal: Absence of new skin breakdown  Description: 1. Monitor for areas of redness and/or skin breakdown  2. Assess vascular access sites hourly  3. Every 4-6 hours minimum:  Change oxygen saturation probe site  4. Every 4-6 hours:  If on nasal continuous positive airway pressure, respiratory therapy assess nares and determine need for appliance change or resting period.   Outcome: Progressing

## 2023-02-06 VITALS
OXYGEN SATURATION: 97 % | SYSTOLIC BLOOD PRESSURE: 128 MMHG | TEMPERATURE: 98 F | HEART RATE: 57 BPM | RESPIRATION RATE: 26 BRPM | HEIGHT: 65 IN | BODY MASS INDEX: 41.34 KG/M2 | DIASTOLIC BLOOD PRESSURE: 52 MMHG | WEIGHT: 248.16 LBS

## 2023-02-06 PROBLEM — R41.0 DISORIENTATION: Status: RESOLVED | Noted: 2022-08-04 | Resolved: 2023-02-06

## 2023-02-06 PROBLEM — G93.41 METABOLIC ENCEPHALOPATHY: Status: RESOLVED | Noted: 2022-08-05 | Resolved: 2023-02-06

## 2023-02-06 PROBLEM — R06.03 ACUTE RESPIRATORY DISTRESS: Status: RESOLVED | Noted: 2023-01-25 | Resolved: 2023-02-06

## 2023-02-06 PROBLEM — R41.3: Status: RESOLVED | Noted: 2021-09-30 | Resolved: 2023-02-06

## 2023-02-06 PROBLEM — M54.50 LOW BACK PAIN: Status: RESOLVED | Noted: 2022-06-23 | Resolved: 2023-02-06

## 2023-02-06 PROBLEM — R53.1 GENERALIZED WEAKNESS: Status: RESOLVED | Noted: 2021-09-19 | Resolved: 2023-02-06

## 2023-02-06 PROBLEM — N18.6 ESRD (END STAGE RENAL DISEASE) (HCC): Status: RESOLVED | Noted: 2022-02-14 | Resolved: 2023-02-06

## 2023-02-06 PROBLEM — N18.6 ESRD (END STAGE RENAL DISEASE) (HCC): Status: RESOLVED | Noted: 2022-08-03 | Resolved: 2023-02-06

## 2023-02-06 PROBLEM — R25.1 TREMOR: Status: RESOLVED | Noted: 2021-12-26 | Resolved: 2023-02-06

## 2023-02-06 PROBLEM — E11.10 DIABETIC KETOACIDOSIS WITHOUT COMA ASSOCIATED WITH TYPE 2 DIABETES MELLITUS (HCC): Status: RESOLVED | Noted: 2022-04-13 | Resolved: 2023-02-06

## 2023-02-06 PROBLEM — M62.81 MUSCLE RIGHT ARM WEAKNESS: Status: RESOLVED | Noted: 2021-09-30 | Resolved: 2023-02-06

## 2023-02-06 PROBLEM — N30.90 CYSTITIS: Status: RESOLVED | Noted: 2022-08-04 | Resolved: 2023-02-06

## 2023-02-06 PROBLEM — E87.6 HYPOKALEMIA: Status: RESOLVED | Noted: 2022-04-14 | Resolved: 2023-02-06

## 2023-02-06 PROBLEM — R73.9 HYPERGLYCEMIA: Status: RESOLVED | Noted: 2022-06-06 | Resolved: 2023-02-06

## 2023-02-06 PROBLEM — M62.838 SPASM OF MUSCLE: Status: RESOLVED | Noted: 2022-06-23 | Resolved: 2023-02-06

## 2023-02-06 LAB
ABSOLUTE EOS #: 0.29 K/UL (ref 0–0.44)
ABSOLUTE IMMATURE GRANULOCYTE: 0.05 K/UL (ref 0–0.3)
ABSOLUTE LYMPH #: 1.15 K/UL (ref 1.1–3.7)
ABSOLUTE MONO #: 0.57 K/UL (ref 0.1–1.2)
ANION GAP SERPL CALCULATED.3IONS-SCNC: 13 MMOL/L (ref 9–17)
BASOPHILS # BLD: 1 % (ref 0–2)
BASOPHILS ABSOLUTE: 0.05 K/UL (ref 0–0.2)
BUN SERPL-MCNC: 68 MG/DL (ref 8–23)
CALCIUM SERPL-MCNC: 9.3 MG/DL (ref 8.6–10.4)
CHLORIDE SERPL-SCNC: 98 MMOL/L (ref 98–107)
CO2 SERPL-SCNC: 28 MMOL/L (ref 20–31)
CREAT SERPL-MCNC: 2.92 MG/DL (ref 0.5–0.9)
EOSINOPHILS RELATIVE PERCENT: 5 % (ref 1–4)
GFR SERPL CREATININE-BSD FRML MDRD: 17 ML/MIN/1.73M2
GLUCOSE BLD-MCNC: 141 MG/DL (ref 65–105)
GLUCOSE BLD-MCNC: 247 MG/DL (ref 65–105)
GLUCOSE SERPL-MCNC: 181 MG/DL (ref 70–99)
HCT VFR BLD AUTO: 34.1 % (ref 36.3–47.1)
HGB BLD-MCNC: 10.7 G/DL (ref 11.9–15.1)
IMMATURE GRANULOCYTES: 1 %
LYMPHOCYTES # BLD: 19 % (ref 24–43)
MCH RBC QN AUTO: 31.3 PG (ref 25.2–33.5)
MCHC RBC AUTO-ENTMCNC: 31.4 G/DL (ref 28.4–34.8)
MCV RBC AUTO: 99.7 FL (ref 82.6–102.9)
MONOCYTES # BLD: 9 % (ref 3–12)
NRBC AUTOMATED: 0 PER 100 WBC
PDW BLD-RTO: 13.7 % (ref 11.8–14.4)
PLATELET # BLD AUTO: 167 K/UL (ref 138–453)
PMV BLD AUTO: 11.9 FL (ref 8.1–13.5)
POTASSIUM SERPL-SCNC: 3.9 MMOL/L (ref 3.7–5.3)
RBC # BLD: 3.42 M/UL (ref 3.95–5.11)
SARS-COV-2 RDRP RESP QL NAA+PROBE: NOT DETECTED
SEG NEUTROPHILS: 65 % (ref 36–65)
SEGMENTED NEUTROPHILS ABSOLUTE COUNT: 4.05 K/UL (ref 1.5–8.1)
SODIUM SERPL-SCNC: 139 MMOL/L (ref 135–144)
SPECIMEN DESCRIPTION: NORMAL
WBC # BLD AUTO: 6.2 K/UL (ref 3.5–11.3)

## 2023-02-06 PROCEDURE — 2580000003 HC RX 258: Performed by: STUDENT IN AN ORGANIZED HEALTH CARE EDUCATION/TRAINING PROGRAM

## 2023-02-06 PROCEDURE — 6360000002 HC RX W HCPCS: Performed by: STUDENT IN AN ORGANIZED HEALTH CARE EDUCATION/TRAINING PROGRAM

## 2023-02-06 PROCEDURE — 99239 HOSP IP/OBS DSCHRG MGMT >30: CPT | Performed by: STUDENT IN AN ORGANIZED HEALTH CARE EDUCATION/TRAINING PROGRAM

## 2023-02-06 PROCEDURE — 6370000000 HC RX 637 (ALT 250 FOR IP)

## 2023-02-06 PROCEDURE — 85025 COMPLETE CBC W/AUTO DIFF WBC: CPT

## 2023-02-06 PROCEDURE — 6370000000 HC RX 637 (ALT 250 FOR IP): Performed by: STUDENT IN AN ORGANIZED HEALTH CARE EDUCATION/TRAINING PROGRAM

## 2023-02-06 PROCEDURE — 94761 N-INVAS EAR/PLS OXIMETRY MLT: CPT

## 2023-02-06 PROCEDURE — 80048 BASIC METABOLIC PNL TOTAL CA: CPT

## 2023-02-06 PROCEDURE — 94640 AIRWAY INHALATION TREATMENT: CPT

## 2023-02-06 PROCEDURE — 2700000000 HC OXYGEN THERAPY PER DAY

## 2023-02-06 PROCEDURE — 36415 COLL VENOUS BLD VENIPUNCTURE: CPT

## 2023-02-06 PROCEDURE — 82947 ASSAY GLUCOSE BLOOD QUANT: CPT

## 2023-02-06 PROCEDURE — 99232 SBSQ HOSP IP/OBS MODERATE 35: CPT | Performed by: INTERNAL MEDICINE

## 2023-02-06 PROCEDURE — 51798 US URINE CAPACITY MEASURE: CPT

## 2023-02-06 PROCEDURE — 6370000000 HC RX 637 (ALT 250 FOR IP): Performed by: CLINICAL NURSE SPECIALIST

## 2023-02-06 PROCEDURE — 97535 SELF CARE MNGMENT TRAINING: CPT

## 2023-02-06 PROCEDURE — 87635 SARS-COV-2 COVID-19 AMP PRB: CPT

## 2023-02-06 RX ORDER — METOLAZONE 5 MG/1
5 TABLET ORAL EVERY OTHER DAY
Status: DISCONTINUED | OUTPATIENT
Start: 2023-02-07 | End: 2023-02-06 | Stop reason: HOSPADM

## 2023-02-06 RX ORDER — ATORVASTATIN CALCIUM 40 MG/1
40 TABLET, FILM COATED ORAL NIGHTLY
Qty: 30 TABLET | Refills: 3 | Status: ON HOLD | OUTPATIENT
Start: 2023-02-06

## 2023-02-06 RX ORDER — CARVEDILOL 12.5 MG/1
12.5 TABLET ORAL 2 TIMES DAILY
Qty: 60 TABLET | Refills: 3 | Status: ON HOLD | OUTPATIENT
Start: 2023-02-06

## 2023-02-06 RX ORDER — BUMETANIDE 1 MG/1
3 TABLET ORAL 2 TIMES DAILY
Qty: 180 TABLET | Refills: 0 | Status: ON HOLD | OUTPATIENT
Start: 2023-02-06 | End: 2023-03-08

## 2023-02-06 RX ORDER — AMLODIPINE BESYLATE 10 MG/1
10 TABLET ORAL DAILY
Qty: 30 TABLET | Refills: 3 | Status: ON HOLD | OUTPATIENT
Start: 2023-02-07

## 2023-02-06 RX ADMIN — ASPIRIN 81 MG: 81 TABLET, CHEWABLE ORAL at 09:44

## 2023-02-06 RX ADMIN — IPRATROPIUM BROMIDE AND ALBUTEROL SULFATE 1 AMPULE: 2.5; .5 SOLUTION RESPIRATORY (INHALATION) at 12:38

## 2023-02-06 RX ADMIN — SODIUM BICARBONATE 1300 MG: 648 TABLET ORAL at 09:43

## 2023-02-06 RX ADMIN — AMLODIPINE BESYLATE 10 MG: 10 TABLET ORAL at 09:44

## 2023-02-06 RX ADMIN — IPRATROPIUM BROMIDE AND ALBUTEROL SULFATE 1 AMPULE: 2.5; .5 SOLUTION RESPIRATORY (INHALATION) at 08:43

## 2023-02-06 RX ADMIN — INSULIN LISPRO 4 UNITS: 100 INJECTION, SOLUTION INTRAVENOUS; SUBCUTANEOUS at 13:43

## 2023-02-06 RX ADMIN — SODIUM CHLORIDE, PRESERVATIVE FREE 10 ML: 5 INJECTION INTRAVENOUS at 09:45

## 2023-02-06 RX ADMIN — TAMSULOSIN HYDROCHLORIDE 0.4 MG: 0.4 CAPSULE ORAL at 09:43

## 2023-02-06 RX ADMIN — DOCUSATE SODIUM 100 MG: 100 CAPSULE ORAL at 09:43

## 2023-02-06 RX ADMIN — HEPARIN SODIUM 5000 UNITS: 5000 INJECTION INTRAVENOUS; SUBCUTANEOUS at 09:44

## 2023-02-06 RX ADMIN — BUMETANIDE 3 MG: 1 TABLET ORAL at 09:43

## 2023-02-06 RX ADMIN — INSULIN GLARGINE 50 UNITS: 100 INJECTION, SOLUTION SUBCUTANEOUS at 09:00

## 2023-02-06 RX ADMIN — PANTOPRAZOLE SODIUM 40 MG: 40 TABLET, DELAYED RELEASE ORAL at 05:51

## 2023-02-06 RX ADMIN — HEPARIN SODIUM 5000 UNITS: 5000 INJECTION INTRAVENOUS; SUBCUTANEOUS at 15:57

## 2023-02-06 RX ADMIN — CARVEDILOL 12.5 MG: 12.5 TABLET, FILM COATED ORAL at 09:43

## 2023-02-06 ASSESSMENT — PAIN SCALES - GENERAL
PAINLEVEL_OUTOF10: 0
PAINLEVEL_OUTOF10: 3

## 2023-02-06 ASSESSMENT — PAIN DESCRIPTION - LOCATION: LOCATION: BACK

## 2023-02-06 NOTE — DISCHARGE INSTR - COC
Continuity of Care Form    Patient Name: Kailey Verdin   :  1960  MRN:  [id number]    Admit date:  2023  Discharge date:  2023    Code Status Order: Full Code   Advance Directives:     Admitting Physician:  Dung Adames MD  PCP: Fritz Denson, APRN - CNP    Discharging Nurse: Yayo Mcgovern Unit/Room#: 0614/9766-60  Discharging Unit Phone Number: [phone]    Emergency Contact:   Extended Emergency Contact Information  Primary Emergency Contact:  [address], [address] Phone: [phone]  Relation: Brother/Sister  Secondary Emergency Contact: Hector Quintanilla  [place] Phone: [phone]  Relation: Brother/Sister    Past Surgical History:  Past Surgical History:   Procedure Laterality Date    ANKLE FRACTURE SURGERY      AV FISTULA CREATION  2021    BACK SURGERY      BREAST SURGERY      CARDIAC SURGERY      CARPAL TUNNEL RELEASE      CHOLECYSTECTOMY, OPEN N/A     COLONOSCOPY      CORONARY ARTERY BYPASS GRAFT      x3    DIALYSIS FISTULA CREATION Left 2021    LEFT AV FISTULA CREATION UPPER EXTREMITY performed by Dimas Levine MD at [address]      IR TUNNELED [address] 5 YEARS  2021    IR TUNNELED CATHETER PLACEMENT GREATER THAN 5 YEARS 2021 [facility] SPECIAL PROCEDURES    KNEE ARTHROSCOPY      right    OTHER SURGICAL HISTORY  2022    cvc exchange    TONSILLECTOMY         Immunization History:   Immunization History   Administered Date(s) Administered    COVID-19, MODERNA Booster BLUE border, (age 18y+), IM, 50mcg/0.25mL 2022    COVID-19, PFIZER PURPLE top, DILUTE for use, (age 15 y+), 30mcg/0.3mL 2021, 2021    Influenza Virus Vaccine 10/18/2018, 09/10/2019, 2020    Influenza, AFLURIA (age 1 yrs+), FLUZONE, (age 10 mo+), MDV, 0.5mL 10/16/2017    Influenza, FLUARIX, FLULAVAL, FLUZONE (age 10 mo+) AND AFLURIA, (age 1 y+), PF, 0.5mL 10/18/2018, 09/10/2019, 09/20/2019, 11/03/2020    Influenza, FLUCELVAX, (age 10 mo+), MDCK, PF, 0.5mL 09/30/2021    Pneumococcal Conjugate 13-valent ([id number]) 08/06/2019    Pneumococcal Polysaccharide ([id number]) 10/30/2014    Tdap (Boostrix, Adacel) 11/18/2017    Zoster Recombinant (Shingrix) 09/23/2022, 01/10/2023       Active Problems:  Patient Active Problem List   Diagnosis Code    Atherosclerosis of coronary artery bypass graft of native heart without angina pectoris I25.810    Acute kidney injury superimposed on CKD ([facility] N17.9, N18.9    Acute on chronic diastolic heart failure ([facility]) I50.33    Diabetic polyneuropathy associated with type 2 diabetes mellitus ([facility]) E11.42    History of coronary artery bypass graft Z95.1    Iron deficiency anemia D50.9    Spinal stenosis of lumbar region with neurogenic claudication M48.062    Mixed hyperlipidemia E78.2    CKD (chronic kidney disease) stage 4, GFR 15-29 ml/min ([facility]) N18.4    Type 2 diabetes 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, with long-term current use of insulin ([facility]) E11.22, N18.6, Z99.2, Z79.4    Obesity, Class II, BMI 35-39.9 E66.9    Thyroid nodule greater than or equal to 1 cm in diameter incidentally noted on imaging study E04.1    Hypertension, essential I10    Chronic ischemic heart disease I25.9    Ischemic stroke of frontal lobe ([facility]) I63.9    Morbid obesity with BMI of 40.0-44.9, adult ([facility]) E66.01, Z68.41    Disequilibrium syndrome E87.8    Anxiety F41.9    Chronic midline low back pain with bilateral sciatica M54.41, M54.42, G89.29    COVID U07.1    Cerebral hypoperfusion I67.9    Immature arteriovenous fistula ([facility]) I77.0    History of fusion of cervical spine Z98.1    Depression with anxiety F41.8    Vancomycin resistant Enterococcus UTI A49.1, Z16.21    Dialysis disequilibrium syndrome E87.8    VRE infection (vancomycin resistant Enterococcus) A49.1, Z16.21    Infection due to ESBL-producing Klebsiella pneumoniae A49.8, Z16.12    Class 2 severe obesity due to excess calories with serious comorbidity and body mass index (BMI) of 35.0 to 35.9 in adult [facility]) E66.01, Z68.35    Type 2 diabetes mellitus with hyperglycemia, with long-term current use of insulin ([facility]) E11.65, Z79.4    Right hemiparesis ([facility]) G81.91    Ulcer of left foot, limited to breakdown of skin ([facility] [address] L97.521    Secondary hyperparathyroidism ([facility] [address] [facility] N25.81    Hypertensive urgency I16.0    Hypoxia R09.02    Congestive heart failure ([facility]) I50.9    Nephrotic range proteinuria R80.9    Acute respiratory failure with hypoxia ([facility]) J96.01       Isolation/Infection:   Isolation            Contact          Patient Infection Status       Infection Onset Added Last Indicated Last Indicated By Review Planned Expiration Resolved Resolved By    ESBL (Extended Spectrum Beta Lactamase) 08/02/22 08/05/22 08/02/22 Culture, Urine        MDRO (multi-drug resistant organism) 08/02/22 08/05/22 08/02/22 Culture, Urine        VRE 08/02/22 08/04/22 08/02/22 Culture, Urine        Urine- 8/22      ESBL (Extended Spectrum Beta Lactamase) 07/02/22 07/04/22 08/02/22 Culture, Urine        MDRO (multi-drug resistant organism) 07/02/22 07/04/22 08/02/22 Culture, Urine        VRE 05/09/22 05/13/22 08/02/22 Culture, Urine        MRSA 08/03/20 08/13/21 08/13/21 Levell Pica, RN        Added from external infection.     Resolved    COVID-19 (Rule Out) 08/05/22 08/05/22 08/05/22 COVID-19, Rapid (Ordered)   08/08/22 Rule-Out Test Resulted    COVID-19 (Rule Out) 06/18/22 06/18/22 06/18/22 COVID-19, Rapid (Ordered)   06/18/22 Rule-Out Test Resulted    COVID-19 (Rule Out) 06/06/22 06/06/22 06/06/22 COVID-19 & Influenza Combo (Ordered)   06/06/22 Rule-Out Test Resulted    COVID-19 (Rule Out) 04/14/22 04/14/22 04/14/22 COVID-19 & Influenza Combo (Ordered)   04/15/22 Corine Reyes RN    COVID-19 (Rule Out) 04/13/22 04/13/22 04/13/22 COVID-19 & Influenza Combo (Ordered)   04/13/22 Rule-Out Test Resulted    COVID-19 (Rule Out) 22 COVID-19 (Ordered)   22 Rule-Out Test Resulted    COVID-19 22 COVID-19   22     COVID-19 (Rule Out) 22 COVID-19 (Ordered)   22 Rule-Out Test Resulted            Nurse Assessment:  Last Vital Signs: BP (!) 128/52   Pulse 59   Temp 98 °F (36.7 °C) (Temporal)   Resp 16   Ht 5' 5\" (1.651 m)   Wt 248 lb 2.6 oz (112.6 kg)   SpO2 94%   BMI 41.30 kg/m²     Last documented pain score (0-10 scale): Pain Level:  (yes)  Last Weight:   Wt Readings from Last 1 Encounters:   23 248 lb 2.6 oz (112.6 kg)     Mental Status:  oriented, alert, coherent, logical, thought processes intact, and able to concentrate and follow conversation    IV Access:  Fistula left forearm     Nursing Mobility/ADLs:  Walking   Assisted  Transfer  Assisted  Bathing  Independent  Dressing  Assisted  Toileting  Assisted  Feeding  [facility] Delivery   whole    Wound Care Documentation and Therapy:        Elimination:  Continence: Bowel: Yes  Bladder: Yes  Urinary Catheter: None and Removal Date 2023    Colostomy/Ileostomy/Ileal Conduit: No       Date of Last BM: 2023    Intake/Output Summary (Last 24 hours) at 2023 1138  Last data filed at 2023 1049  Gross per 24 hour   Intake 1260 ml   Output 3000 ml   Net -1740 ml     I/O last 3 completed shifts: In: 1140 [P.O.:1140]  Out: 3900 [Urine:3900]    Safety Concerns: At Risk for Falls    Impairments/Disabilities:      None    Nutrition Therapy:  Current Nutrition Therapy:   - Oral Diet:  General    Routes of Feeding: Oral  Liquids: No Restrictions  Daily Fluid Restriction: no  Last Modified Barium Swallow with Video (Video Swallowing Test): not done    Treatments at the Time of Hospital Discharge:   Respiratory Treatments: none  Oxygen Therapy:   intermittently uses oxygen when SPO2 is low.    Ventilator:    - No ventilator support    Rehab Therapies: none  Weight Bearing Status/Restrictions: No weight bearing restrictions  Other Medical Equipment (for information only, NOT a DME order):  walker  Other Treatments: none    Patient's personal belongings (please select all that are sent with patient):  Glasses, pants, shoes, shirt and jacket    RN SIGNATURE:  Electronically signed by Jayla Peterson RN on 2/6/23 at 3:32 PM EST    CASE MANAGEMENT/SOCIAL WORK SECTION    Inpatient Status Date: ***    Readmission Risk Assessment Score:  Readmission Risk              Risk of Unplanned Readmission:  43           Discharging to Facility/ Agency   Name: [facility]  Address:  Phone:  Fax:        / signature: Electronically signed by Eulogio Johansen RN on 2/6/23 at 11:51 AM EST    PHYSICIAN SECTION    Prognosis: Good    Condition at Discharge: Stable    Rehab Potential (if transferring to Rehab): Good    Recommended Labs or Other Treatments After Discharge: Imdur and Ranexa held due to concerns of anaphylaxis. Follow-up with cardiology for recommendations on medications. Margarita LAMAS in 4 weeks. Follow-up with nephrology in 4 weeks. Physician Certification: I certify the above information and transfer of Rakan Silva  is necessary for the continuing treatment of the diagnosis listed and that she requires [facility] for less 30 days.      Update Admission H&P: No change in H&P    PHYSICIAN SIGNATURE:  Electronically signed by Melinda Duran MD on 2/6/23 at 11:39 AM EST

## 2023-02-06 NOTE — PLAN OF CARE
Problem: Discharge Planning  Goal: Discharge to home or other facility with appropriate resources  Outcome: Progressing     Problem: Pain  Goal: Verbalizes/displays adequate comfort level or baseline comfort level  Outcome: Progressing     Problem: Safety - Adult  Goal: Free from fall injury  Outcome: Progressing     Problem: Chronic Conditions and Co-morbidities  Goal: Patient's chronic conditions and co-morbidity symptoms are monitored and maintained or improved  Outcome: Progressing     Problem: Respiratory - Adult  Goal: Achieves optimal ventilation and oxygenation  2/5/2023 1956 by Jhon Estrada RN  Outcome: Progressing     Problem: Skin/Tissue Integrity  Goal: Absence of new skin breakdown  Description: 1. Monitor for areas of redness and/or skin breakdown  2. Assess vascular access sites hourly  3. Every 4-6 hours minimum:  Change oxygen saturation probe site  4. Every 4-6 hours:  If on nasal continuous positive airway pressure, respiratory therapy assess nares and determine need for appliance change or resting period.   Outcome: Progressing     Problem: Nutrition Deficit:  Goal: Optimize nutritional status  Outcome: Progressing

## 2023-02-06 NOTE — CARE COORDINATION
Met with patient to discuss transitional planning. Plan is [facility]. Call placed to [facility] who states that precert . She is calling jhoana now to update precert. Facility does need rapid covid prior to transfer    1215: spoke with [facility] from [facility] who states that the auth from [facility] has been updated and they can accept patient today. 1220: faxed request to [address] [place] [address] for 4pm w/c . Notified bedside nurse of need for rapid covid test.     3684: notified [facility] of 4pm  time. Patient also notified of confirmed 4pm  time.      1545: AVS, HENS, ANTHONY, and negative rapid covid faxed to facility

## 2023-02-06 NOTE — PROGRESS NOTES
[facility]  Office: [address], DO, Yanique French, DO, Kirsten Gloria, DO, Sobia Navarrow Blood, DO, Pia Shipley MD, Pam Carlin MD, Rochelle Mckoy MD, Autry Collet, MD,  Barb Stark MD, Li Hinkle MD, David Clayton DO, Micah Trejo MD,  Gene Borrero MD, Don Knowles MD, Marco Antonio Gallegos DO, Shraddha Santos MD, Aleida Wright MD, Nita Haider DO, Renan Issa MD, Latha Molina MD, Leslie Mccartney MD, Anju Martin MD, Sarah Paulino DO, Marisol Brand MD, Taran Kwan MD, Lisa Sneed, Lor Jones, CNP, Tia Andrew CNP, Doc Michele, CNP,  Zoran Daigle, [facility], Britney Franco, CNP, Apple Mcgill, CNP, Gabino Matthew, CNP, Lasha Rodriguez, CNP, General Hansen, CNP, Roberto Singh PA-C, Yosef Mckeon, CNS, Horton Blizzard, CNP, Leslie Coleman, [facility]         Enrike Rodgers [address]    Progress Note    2/6/2023    7:27 AM    Name:   Kailey Verdin  MRN:     [id number]     Acct:      [de-identified]   Room:   [address] [place] [address] Day:  12  Admit Date:  1/25/2023 10:13 AM    PCP:   AFSANEH Cisneros CNP  Code Status:  Full Code    Subjective:     C/C:   Chief Complaint   Patient presents with    Shortness of Breath    Weight Gain     Interval History Status: improved. Hemodynamically stable. Saturating well on 3 L oxygen. Weaning off  Creatinine 2.92. Continued on oral Bumex and metolazone. Net -21 L since admission. Okay to be discharged to SNF per nephrology standpoint. Brief History:     Kailey Verdin is a 57 y.o. Non- / non  female who presents with Shortness of Breath and Weight Gain   and is admitted to the hospital for the management of Acute respiratory distress. Complicated by AURELIA on CKD.      57year-old female with known medical history of diastolic heart failure, CKD, hypertension, hyperlipidemia, type 2 diabetes mellitus, coronary artery disease with history of CABG presents to the hospital with worsening shortness of breath and leg swelling for last 1 month. Patient states that she was on dialysis after she got contrast for a CT PE scan and was recently taken off dialysis in August 2022 by Dr. Efrem Dalton. Her kidney numbers have remained stable after getting off dialysis. Patient reports that she started noticing increasing swelling of her lower extremities and increasing shortness of breath on minimal exertion. Patient followed up with her PCP who advised patient to get chest x-ray done which showed congestion. Patient was then asked by PCP to come to the hospital.     In the ER patient was noted to be hypertensive with blood pressure 186/88, she was hypoxic saturating 78% on room air. She was initially started on nonrebreather and then switched to BiPAP. On my examination patient was on BiPAP. She was also started on nitro drip for hypertension and pulmonary edema. Her creatinine is around 2.12. Troponin of 82 and 83. Stay her respiratory status got better. She stated on 3 L nasal telemetry throughout stay. Her creatinine got better with Lasix drip. Was later switched to IV Bumex. Currently on oral Bumex and metolazone. Okay to be discharged to facility per  nephrology. Review of Systems:     Constitutional:  negative for chills, fevers, sweats  Respiratory:  Neg shortness of breath. Negative for wheezing, throat swelling. Cardiovascular: Positive for 1 +lower extremity edema. Negative for chest pain, palpitations  Gastrointestinal:  negative for abdominal pain, constipation, diarrhea, nausea, vomiting  Neurological:  negative for dizziness, headache    Medications: Allergies:     Allergies   Allergen Reactions    Adhesive Tape Other (See Comments) and Rash     Other reaction(s): Unknown    Isosorbide Dinitrate Angioedema    Ranexa [Ranolazine] Angioedema    Metformin And Related Diarrhea    Ace Inhibitors Other (See Comments)     Cough, states not good for her kidneys    Iv Dye [Iodides]      Stage 4 kidney disease    Nsaids      CANNOT TAKE D/T KIDNEY DISEASE    Metformin And Related Hives, Itching and Rash     Stage 4 kidney disease.        Current Meds:   Scheduled Meds:    insulin lispro  15 Units SubCUTAneous Once    insulin glargine  50 Units SubCUTAneous BID    bumetanide  3 mg Oral BID    metOLazone  5 mg Oral Daily    carvedilol  12.5 mg Oral BID    tamsulosin  0.4 mg Oral Daily    insulin lispro  0-16 Units SubCUTAneous TID WC    ipratropium-albuterol  1 ampule Inhalation Q4H WA    gabapentin  300 mg Oral Nightly    docusate sodium  100 mg Oral BID    [Held by provider] ranolazine  1,000 mg Oral Daily    [Held by provider] isosorbide dinitrate  10 mg Oral TID    aspirin  81 mg Oral Daily    atorvastatin  40 mg Oral Nightly    sodium bicarbonate  1,300 mg Oral BID    pantoprazole  40 mg Oral QAM AC    sodium chloride flush  5-40 mL IntraVENous 2 times per day    heparin (porcine)  5,000 Units SubCUTAneous 3 times per day    amLODIPine  10 mg Oral Daily     Continuous Infusions:    dextrose      sodium chloride       PRN Meds: guaiFENesin, benzocaine-menthol, melatonin, melatonin, glucose, dextrose bolus **OR** dextrose bolus, glucagon (rDNA), dextrose, sodium chloride flush, sodium chloride, potassium chloride **OR** potassium alternative oral replacement **OR** potassium chloride, magnesium sulfate, ondansetron **OR** ondansetron, polyethylene glycol, acetaminophen **OR** acetaminophen, labetalol, hydrALAZINE    Data:     Past Medical History:   has a past medical history of Arthritis, Backache, unspecified, Cerebral artery occlusion with cerebral infarction ([facility] CHF (congestive heart failure) ([facility] Chronic kidney disease, Coronary atherosclerosis of artery bypass graft, COVID, Cramp of limb, Gallstones, Hemodialysis patient ([facility] Hyperlipidemia, Hypertension, Insomnia, Neuromuscular disorder ([facility] [address] [facility] Pneumonia, Psychiatric problem, Thyroid disease, Type II or unspecified type diabetes mellitus with renal manifestations, not stated as uncontrolled(250.40), Type II or unspecified type diabetes mellitus without mention of complication, not stated as uncontrolled, and Unspecified vitamin D deficiency.    Social History:   reports that she has never smoked. She has never used smokeless tobacco. She reports that she does not drink alcohol and does not use drugs.     Family History:   Family History   Problem Relation Age of Onset    Diabetes Father     Heart Failure Father        Vitals:  /66   Pulse 56   Temp 98.4 °F (36.9 °C) (Temporal)   Resp 14   Ht 5' 5\" (1.651 m)   Wt 248 lb 2.6 oz (112.6 kg)   SpO2 94%   BMI 41.30 kg/m²   Temp (24hrs), Av.7 °F (37.1 °C), Min:98.4 °F (36.9 °C), Max:99.2 °F (37.3 °C)    Recent Labs     23  1138 23  1506 23  1929 23  0711   POCGLU 357* 287* 218* 141*         I/O (24Hr):    Intake/Output Summary (Last 24 hours) at 2023 0727  Last data filed at 2023 0400  Gross per 24 hour   Intake 900 ml   Output 2400 ml   Net -1500 ml         Labs:  Hematology:  Recent Labs     23  0312 23  0320 23  0318   WBC 5.4 6.7 6.2   RBC 3.69* 3.61* 3.42*   HGB 11.4* 11.2* 10.7*   HCT 36.5 35.8* 34.1*   MCV 98.9 99.2 99.7   MCH 30.9 31.0 31.3   MCHC 31.2 31.3 31.4   RDW 14.0 13.8 13.7    175 167   MPV 12.2 12.1 11.9       Chemistry:  Recent Labs     23  0830 23  0320 23  0318    139 139   K 4.8 4.0 3.9   CL 98 98 98   CO2 28 29 28   GLUCOSE 219* 159* 181*   BUN 64* 70* 68*   CREATININE 2.59* 3.11* 2.92*   ANIONGAP 12 12 13   LABGLOM 20* 16* 17*   CALCIUM 9.3 9.3 9.3       Recent Labs     23  2112 23  0735 23  1138 23  1506 23  1929 23  0711   POCGLU 272* 157* 357* 287* 218* 141*       ABG:  Lab Results   Component Value Date/Time    PHART 7.429 2022 01:42 PM    HDD9XHL 45.9 2022 01:42 PM     PO2ART 47.6 04/24/2022 01:42 PM    HZE0JIX 30.4 04/24/2022 01:42 PM    PBEA 6.1 04/24/2022 01:42 PM    O2PNNEFP 82.8 04/24/2022 01:42 PM    FIO2 INFORMATION NOT PROVIDED 01/25/2023 10:59 AM     Lab Results   Component Value Date/Time    SPECIAL RIGHT HAND 2ML 08/03/2022 02:22 AM     Lab Results   Component Value Date/Time    CULTURE NO SIGNIFICANT GROWTH 01/10/2023 02:14 PM       Radiology:  XR CHEST (2 VW)    Result Date: 1/24/2023  Findings suggest congestive heart failure The findings were sent to the Radiology Results [address] at 5:31 pm on 1/24/2023 to be communicated to a licensed caregiver. XR CHEST PORTABLE    Result Date: 1/25/2023  Appearance favoring worsening [facility] [place] vascular congestion/interstitial edema. Physical Examination:        General appearance:  alert, cooperative and no distress. Saturating well on 3 L oxygen via nasal cannula  Mental Status:  oriented to person, place and time and normal affect  HEENT: No significant tongue or throat throat edema. Lungs:  Normal effort. Normal bilateral air entry. Heart:  regular rate and rhythm, no murmur  Abdomen:  soft, nontender, nondistended, bowel sounds are present.   Extremities: 1+ pitting bilateral lower extremity edema, no redness, no tenderness in the calves  Skin:  no gross lesions, rashes, induration    Assessment:        Hospital Problems             Last Modified POA    * (Principal) Acute respiratory distress 1/25/2023 Yes    Type 2 diabetes mellitus with hyperglycemia, with long-term current use of insulin ([address] 1/25/2023 Yes    Hypertensive urgency 1/25/2023 Yes    Hypoxia 1/25/2023 Yes    Congestive heart failure ([address] 1/26/2023 Yes    Nephrotic range proteinuria 1/26/2023 Yes    Acute respiratory failure with hypoxia ([address] 2/1/2023 Yes    Atherosclerosis of coronary artery bypass graft of native heart without angina pectoris 1/25/2023 Yes    Acute on chronic diastolic heart failure ([address] 1/25/2023 Yes    Diabetic polyneuropathy associated with type 2 diabetes mellitus ([facility] 1/25/2023 Yes    History of coronary artery bypass graft 1/25/2023 Yes    Mixed hyperlipidemia 1/25/2023 Yes    CKD (chronic kidney disease) stage 4, GFR 15-29 ml/min ([facility] 1/31/2023 Yes    Hypertension, essential 2/1/2023 Yes    Morbid obesity with BMI of 40.0-44.9, adult ([facility] 1/25/2023 Yes   Plan:        Laryngeal edema. Resolved. Will avoid isosorbide dinitrate and Ranexa at this time due to suspected anaphylaxis. Acute respiratory failure with hypoxia. Secondary to #3. Currently saturating well on 3 L nasal cannula. Weaning. Home O2 eval at time of discharge. Outpatient sleep study. Acute on chronic diastolic congestive heart failure-much better. .on oral  Bumex  and metolazone 5 mg daily. Appreciate nephrology recommendations. Hypertension. Continue amlodipine 10 mg daily, Coreg 12.5 mg twice daily     AURELIA on Stage IV CKD. Creatinine bett. On oral Bumex and metolazone. Good urine output. Continue daily BMP. Intake output. Type 2 diabetes mellitus. Blood sugar better this morning. Continue fl Lantus to 40 units BID. High-dose corrective algorithm. POCT glucose and hypoglycemia protocol. Advised compliance with diabetic diet    Hyperlipidemia. Continue Lipitor 40 mg nightly. CAD status post CABG. last cath 2019 showed all patent grafts. Continue aspirin 81 mg daily, Lipitor 40 mg nightly, Coreg 12.5 mg twice daily. Morbid obesity. Encourage lifestyle modification with diet and exercise. DVT prophylaxis: Heparin 5000 units subcutaneously every 8 hours. GI prophylaxis: Protonix 40 mg daily. Discharge planning: Plan to discharge to [facility]. Cleared by nephrology.       Fawn Mercado MD  2/6/2023  7:27 AM

## 2023-02-06 NOTE — PLAN OF CARE
Problem: Discharge Planning  Goal: Discharge to home or other facility with appropriate resources  2/5/2023 2325 by Rupert Foster RN  Outcome: Progressing  2/5/2023 1956 by Wendy Bueno RN  Outcome: Progressing

## 2023-02-06 NOTE — DISCHARGE INSTRUCTIONS
Imdur and Ranexa held. Will advised to follow-up with cardiology for further recommendations on these. BMP on 2/8/23, fax to [phone].   Follow-up with nephrology in 4 weeks with Dr Haseeb Le

## 2023-02-06 NOTE — PROGRESS NOTES
Nephrology Progress Note      SUBJECTIVE      Patient seen and examined bedside  No acute issues overnight  Denies any new symptoms    Patient has been afebrile, heart rate and blood pressure okay, saturating well on 3 L nasal cannula  Labs this morning sodium 139, potassium 3.9, bicarb 28, BUN 68, creatinine 2.92, WBC 6.2, hemoglobin 10.7, platelets 693  Urine output charted 2.4 L / 24 hours  Currently on Bumex 3 mg oral twice daily, metolazone every other day  Discharge planning to SNF    OBJECTIVE      CURRENT TEMPERATURE:  Temp: 98.4 °F (36.9 °C)  MAXIMUM TEMPERATURE OVER 24HRS:  Temp (24hrs), Av.6 °F (37 °C), Min:98.4 °F (36.9 °C), Max:99.2 °F (37.3 °C)    CURRENT RESPIRATORY RATE:  Resp: 14  CURRENT PULSE:  Heart Rate: 56  CURRENT BLOOD PRESSURE:  BP: 127/66  24HR BLOOD PRESSURE RANGE:  Systolic (76HOV), WLS:720 , Min:112 , IUR:033   ; Diastolic (01JRF), OHI:74, Min:47, Max:69    24HR INTAKE/OUTPUT:    Intake/Output Summary (Last 24 hours) at 2023 0820  Last data filed at 2023 0400  Gross per 24 hour   Intake 900 ml   Output 2400 ml   Net -1500 ml       PHYSICAL EXAM      GENERAL APPEARANCE:Awake, alert, in no acute distress  SKIN: warm and dry, no rash or erythema  EYES: conjunctivae normal and sclera anicteric  NECK:  JVD: None   PULMONARY: Bibasilar crackles  CADRDIOVASCULAR: Normal heart sounds  ABDOMEN: Soft , nontender  EXTREMITIES: Bilateral pedal edema    CURRENT MEDICATIONS      insulin lispro (HUMALOG) injection vial 15 Units, Once  insulin glargine (LANTUS) injection vial 50 Units, BID  bumetanide (BUMEX) tablet 3 mg, BID  guaiFENesin (ROBITUSSIN) 100 MG/5ML liquid 200 mg, Q4H PRN  metOLazone (ZAROXOLYN) tablet 5 mg, Daily  carvedilol (COREG) tablet 12.5 mg, BID  tamsulosin (FLOMAX) capsule 0.4 mg, Daily  insulin lispro (HUMALOG) injection vial 0-16 Units, TID WC  ipratropium-albuterol (DUONEB) nebulizer solution 1 ampule, Q4H WA  benzocaine-menthol (CEPACOL SORE THROAT) lozenge 1 lozenge, Q2H PRN  gabapentin (NEURONTIN) capsule 300 mg, Nightly  melatonin tablet 3 mg, Nightly PRN  melatonin tablet 10 mg, Nightly PRN  glucose chewable tablet 16 g, PRN  dextrose bolus 10% 125 mL, PRN   Or  dextrose bolus 10% 250 mL, PRN  glucagon (rDNA) injection 1 mg, PRN  dextrose 10 % infusion, Continuous PRN  docusate sodium (COLACE) capsule 100 mg, BID  [Held by provider] ranolazine (RANEXA) extended release tablet 1,000 mg, Daily  [Held by provider] isosorbide dinitrate (ISORDIL) tablet 10 mg, TID  aspirin chewable tablet 81 mg, Daily  atorvastatin (LIPITOR) tablet 40 mg, Nightly  sodium bicarbonate tablet 1,300 mg, BID  pantoprazole (PROTONIX) tablet 40 mg, QAM AC  sodium chloride flush 0.9 % injection 5-40 mL, 2 times per day  sodium chloride flush 0.9 % injection 10 mL, PRN  0.9 % sodium chloride infusion, PRN  potassium chloride (KLOR-CON M) extended release tablet 40 mEq, PRN   Or  potassium bicarb-citric acid (EFFER-K) effervescent tablet 40 mEq, PRN   Or  potassium chloride 10 mEq/100 mL IVPB (Peripheral Line), PRN  magnesium sulfate 1000 mg in dextrose 5% 100 mL IVPB, PRN  ondansetron (ZOFRAN-ODT) disintegrating tablet 4 mg, Q8H PRN   Or  ondansetron (ZOFRAN) injection 4 mg, Q6H PRN  polyethylene glycol (GLYCOLAX) packet 17 g, Daily PRN  acetaminophen (TYLENOL) tablet 650 mg, Q6H PRN   Or  acetaminophen (TYLENOL) suppository 650 mg, Q6H PRN  heparin (porcine) injection 5,000 Units, 3 times per day  labetalol (NORMODYNE;TRANDATE) injection 10 mg, Q6H PRN  amLODIPine (NORVASC) tablet 10 mg, Daily  hydrALAZINE (APRESOLINE) injection 10 mg, Q6H PRN          LABS      CBC:   Recent Labs     02/04/23  0312 02/05/23  0320 02/06/23  0318   WBC 5.4 6.7 6.2   RBC 3.69* 3.61* 3.42*   HGB 11.4* 11.2* 10.7*   HCT 36.5 35.8* 34.1*   MCV 98.9 99.2 99.7   MCH 30.9 31.0 31.3   MCHC 31.2 31.3 31.4   RDW 14.0 13.8 13.7    175 167   MPV 12.2 12.1 11.9      BMP:   Recent Labs     02/04/23  0830 02/05/23  0320 02/06/23  0318    139 139   K 4.8 4.0 3.9   CL 98 98 98   CO2 28 29 28   BUN 64* 70* 68*   CREATININE 2.59* 3.11* 2.92*   GLUCOSE 219* 159* 181*   CALCIUM 9.3 9.3 9.3      BNP:  Lab Results   Component Value Date/Time    BNP 41 04/09/2015 12:00 AM     PHOSPHORUS:  No results for input(s): PHOS in the last 72 hours. MAGNESIUM: No results for input(s): MG in the last 72 hours. ALBUMIN: No results for input(s): LABALBU in the last 72 hours. IRON:    Lab Results   Component Value Date/Time    IRON 83 09/27/2022 01:32 PM     IRON SATURATION:    Lab Results   Component Value Date/Time    LABIRON 38 09/27/2022 01:32 PM     TIBC:    Lab Results   Component Value Date/Time    TIBC 217 09/27/2022 01:32 PM     FERRITIN:    Lab Results   Component Value Date/Time    FERRITIN 117 09/27/2022 01:32 PM     RUSSELL:   Lab Results   Component Value Date    RUSSELL NEGATIVE 08/03/2022       SPEP:   Lab Results   Component Value Date/Time    PROT 7.0 12/01/2022 10:25 AM     UPEP:   Lab Results   Component Value Date/Time    TPU 20 11/12/2021 10:30 AM      HEPBSAG:  Lab Results   Component Value Date/Time    HEPBSAG NONREACTIVE 03/30/2022 03:30 PM     HEPCAB:  Lab Results   Component Value Date/Time    HEPCAB NONREACTIVE 03/30/2022 03:30 PM     C3: No results found for: C3  C4: No results found for: C4  MPO ANCA: No results found for: MPO .   PR3 ANCA:  No results found for: PR3  URINE SODIUM:    Lab Results   Component Value Date/Time    JASON 47 11/14/2021 02:04 AM      URINE POTASSIUM:  No results found for: KUR  URINE CHLORIDE:  No results found for: CLU  URINE PH:  No components found for: PO4U  URINE OSMOLARITY:  No results found for: OSMOU  URINE CREATININE:    Lab Results   Component Value Date/Time    LABCREA 65.4 01/26/2023 01:10 PM     URINE EOSINOPHILS: No components found for: EOSU  URINE PROTEIN:    Lab Results   Component Value Date/Time    TPU 20 11/12/2021 10:30 AM     URINALYSIS:  U/A:   Lab Results   Component Value Date/Time    NITRU NEGATIVE 01/10/2023 02:14 PM    COLORU Yellow 01/10/2023 02:14 PM    PHUR 7.0 01/10/2023 02:14 PM    WBCUA 2 TO 5 01/10/2023 02:14 PM    RBCUA 0 TO 2 01/10/2023 02:14 PM    MUCUS 2+ 07/31/2022 12:00 PM    TRICHOMONAS NOT REPORTED 11/14/2021 02:05 AM    YEAST FEW 07/02/2022 09:10 AM    BACTERIA FEW 01/10/2023 02:14 PM    SPECGRAV 1.021 01/10/2023 02:14 PM    LEUKOCYTESUR SMALL 01/10/2023 02:14 PM    UROBILINOGEN Normal 01/10/2023 02:14 PM    BILIRUBINUR NEGATIVE 01/10/2023 02:14 PM    GLUCOSEU NEGATIVE 01/10/2023 02:14 PM    KETUA NEGATIVE 01/10/2023 02:14 PM    AMORPHOUS 2+ 07/31/2022 12:00 PM     ANTIGBM:No results found for: Luly. Osmani 135 as available. ASSESSMENT      1. CKD 4 likely due to underlying diabetic renal involvement. Was on dialysis for several months, got off of dialysis about 5-1/2 months ago and later taken off of HD. Her creatinine currently seems to be running around 2.6- 3 BASED on volume status    2. Acute on chronic diastolic CHF- improved   3. Hypertensive urgency  3. Acute on chronic hypoxic respiratory failure  4. Diabetes mellitus type 2  5. CAD s/p CABG, last cath in October 2019  6. Essential hypertension  7. Obesity  8. History of DVT     PLAN      1. Change Bumex IV to oral 3 mg twice daily, continue metolazone 5 mg every other day  2. Strict I's and O's, daily weights  3. Continue to hold on starting ARB for now  4. Okay for discharge from nephrology standpoint, outpatient follow-up and BMP as ordered  5. Final recommendations to follow after discussing with attending      eHctor Spears  PGY-2  Internal Medicine  [address], [facility]   10:19 AM 2/6/2023     Attending Physician Statement  I have discussed the care of Francisco Walls, including pertinent history and exam findings,  with the Fellow/Residentt.  I have reviewed the key elements of all parts of the encounter with the Fellow/ Angiography possible intervention for ruptured aneurysm, sdh Resident. I agree with the assessment, plan and orders as documented by the resident.     Rocio Krause MD MD, MRCP Mehul Saavedra, FACP   2/6/2023 11:00 AM    Nephrology [address] [place] [address] Drive Cerebral angiogram possible intervention cerebral angiogram

## 2023-02-06 NOTE — DISCHARGE SUMMARY
[unfilled]    [facility]    [facility] Summary     Patient ID: Juan Ramon William  :  1960   MRN: [id number]     ACCOUNT:  [de-identified]   Patient's PCP: AFSANEH Caruso CNP  Admit Date: 2023   Discharge Date: 2023  Length of Stay: 12  Code Status:  Prior  Admitting Physician: Maurilio Mart MD  Discharge Physician: Maurilio Mart MD     Active Discharge Diagnoses:     Hospital Problem Lists:  Principal Problem (Resolved):    Acute respiratory distress  Active Problems:    Acute respiratory failure with hypoxia ([address]    Type 2 diabetes mellitus with hyperglycemia, with long-term current use of insulin (HCC)    Hypertensive urgency    Hypoxia    Congestive heart failure ([facility]    Nephrotic range proteinuria    Atherosclerosis of coronary artery bypass graft of native heart without angina pectoris    Acute kidney injury superimposed on CKD ([facility]    Acute on chronic diastolic heart failure ([facility] [facility]    Diabetic polyneuropathy associated with type 2 diabetes mellitus ([facility]    History of coronary artery bypass graft    Mixed hyperlipidemia    CKD (chronic kidney disease) stage 4, GFR 15-29 ml/min ([facility])    Hypertension, essential    Morbid obesity with BMI of 40.0-44.9, adult [facility])      Admission Condition:  poor     Discharged Condition: stable    Hospital Stay:     Hospital Course:    Juan Ramon William is a 57 y.o. Non- / non  female who presents with Shortness of Breath and Weight Gain and found to be in  Acute respiratory distress. Complicated by AURELIA on CKD. 57year-old female with known medical history of diastolic heart failure, CKD, hypertension, hyperlipidemia, type 2 diabetes mellitus, coronary artery disease with history of CABG presents to the hospital with worsening shortness of breath and leg swelling for last 1 month.   Patient states that she was on dialysis after she got contrast for a CT PE scan and was recently taken off dialysis in August 2022 by Dr. Claribel Mcgarry. During this admission she was initially hypertensive and in acute respiratory distress requiring BiPAP initially. She also had NSTEMI was evaluated by cardiology. Her respiratory status got better eventually. Still requires 3 L of oxygen on nasal cannula. Will wean off. Her creatinine got better with Lasix drip. Later she was switched to oral Bumex and metolazone. Okay to be discharged to facility per  nephrology. Significant therapeutic interventions:   Laryngeal edema. Resolved. Will avoid isosorbide dinitrate and Ranexa at this time due to suspected anaphylaxis. Acute respiratory failure with hypoxia. Secondary to #3. Currently saturating well on 3 L nasal cannula. Weaning. Home O2 eval at time of discharge. Outpatient sleep study. Acute on chronic diastolic congestive heart failure-much better. .on oral  Bumex  and metolazone 5 mg daily. Appreciate nephrology recommendations. Hypertension. Continue amlodipine 10 mg daily, Coreg 12.5 mg twice daily      AURELIA on Stage IV CKD. Creatinine bett. On oral Bumex and metolazone. Good urine output. Continue daily BMP. Intake output. Type 2 diabetes mellitus. Blood sugar better this morning. Continue fl Lantus to 40 units BID. High-dose corrective algorithm. POCT glucose and hypoglycemia protocol. Advised compliance with diabetic diet     Hyperlipidemia. Continue Lipitor 40 mg nightly. CAD status post CABG. last cath 2019 showed all patent grafts. Continue aspirin 81 mg daily, Lipitor 40 mg nightly, Coreg 12.5 mg twice daily.          Significant Diagnostic Studies:   Labs / Micro:  CBC:   Lab Results   Component Value Date/Time    WBC 6.1 02/07/2023 05:45 AM    RBC 3.42 02/07/2023 05:45 AM    HGB 10.7 02/07/2023 05:45 AM    HCT 33.5 02/07/2023 05:45 AM    MCV 98.0 02/07/2023 05:45 AM    MCH 31.3 02/07/2023 05:45 AM    MCHC 31.9 02/07/2023 05:45 AM    RDW 13.7 02/07/2023 05:45 AM     02/07/2023 05:45 AM     CMP:    Lab Results   Component Value Date/Time    GLUCOSE 281 02/07/2023 05:45 AM     02/07/2023 05:45 AM    K 3.8 02/07/2023 05:45 AM    CL 97 02/07/2023 05:45 AM    CO2 30 02/07/2023 05:45 AM    BUN 73 02/07/2023 05:45 AM    CREATININE 3.35 02/07/2023 05:45 AM    ANIONGAP 15 02/07/2023 05:45 AM    ALKPHOS 120 02/07/2023 05:45 AM    ALT 11 02/07/2023 05:45 AM    AST 12 02/07/2023 05:45 AM    BILITOT 0.3 02/07/2023 05:45 AM    LABALBU 3.0 02/07/2023 05:45 AM    ALBUMIN 0.9 02/07/2023 05:45 AM    LABGLOM 15 02/07/2023 05:45 AM    GFRAA 27 09/06/2022 04:40 PM    GFR      09/06/2022 04:40 PM    PROT 6.4 02/07/2023 05:45 AM    CALCIUM 9.3 02/07/2023 05:45 AM        Radiology:  No results found. Consultations:    Consults:     Final Specialist Recommendations/Findings:   IP CONSULT TO IV TEAM  IP CONSULT TO INTERNAL MEDICINE  IP CONSULT TO HOSPITALIST  IP CONSULT TO NEPHROLOGY  IP CONSULT TO CARDIOLOGY      The patient was seen and examined on day of discharge and this discharge summary is in conjunction with any daily progress note from day of discharge. Discharge plan:     Disposition: To a non-[facility] facility    Physician Follow Up: [facility]  [address] [place], [place] [address] [address]  [phone]  Call in 1 week(s)  recommendations on imdur and ranexa    Jarad Reyna MD  [address] [place] [address]    Follow up in 4 week(s)  f/u BMP       Requiring Further Evaluation/Follow Up POST HOSPITALIZATION/Incidental Findings: Follow-up with cardiology. Recommendations ranexa  Imdur. Diet: cardiac diet    Activity: As tolerated    Instructions to Patient: Follow-up with cardiology for recommendations on Ranexa and Imdur. Follow-up with nephrology.   O2 eval    Discharge Medications:      Medication List        START taking these medications      amLODIPine 10 MG tablet  Commonly known as: NORVASC  Take 1 tablet by mouth daily     bumetanide 1 MG tablet  Commonly known as: BUMEX  Take 3 tablets by mouth 2 times daily F/u with nephro for dose adjustment            CHANGE how you take these medications      atorvastatin 40 MG tablet  Commonly known as: LIPITOR  Take 1 tablet by mouth nightly  What changed:   medication strength  See the new instructions. carvedilol 12.5 MG tablet  Commonly known as: COREG  Take 1 tablet by mouth 2 times daily  What changed:   medication strength  how much to take            CONTINUE taking these medications      acetaminophen 325 MG tablet  Commonly known as: TYLENOL     allopurinol 100 MG tablet  Commonly known as: ZYLOPRIM  Take 1 tablet by mouth daily     aspirin 81 MG chewable tablet  Take 1 tablet by mouth daily     AZO-CRANBERRY PO     Basaglar KwikPen 100 UNIT/ML injection pen  Generic drug: insulin glargine  Inject 55 Units into the skin 2 times daily     blood glucose test strips  Test 3 times a day & as needed for symptoms of irregular blood glucose. Dispense sufficient amount for indicated testing frequency plus additional to accommodate PRN testing needs.      Dexcom G6  Ray Settler  1 each by Does not apply route 4 times daily     Dexcom G6 Sensor Misc  1 each by Does not apply route 4 times daily     docusate sodium 100 MG capsule  Commonly known as: COLACE  Take 1 capsule by mouth 2 times daily     Easy Touch Pen Needles 29G X 12MM Misc  Generic drug: Insulin Pen Needle  5 each by Does not apply route daily     FIBER-CAPS PO     gabapentin 300 MG capsule  Commonly known as: NEURONTIN     HumaLOG 100 UNIT/ML injection vial  Generic drug: insulin lispro     * Lancets Misc  1 each by Does not apply route daily     * ReliOn Lancets Micro-Thin 33G Misc     linaclotide 145 MCG capsule  Commonly known as: Linzess  Take 1 capsule by mouth every morning (before breakfast)     pantoprazole 40 MG tablet  Commonly known as: PROTONIX  TAKE 1 TABLET BY MOUTH ONCE DAILY IN THE MORNING BEFORE BREAKFAST     PROBIOTIC ACIDOPHILUS PO     tamsulosin 0.4 MG capsule  Commonly known as: FLOMAX  Take 1 capsule by mouth daily     True Metrix Go Glucose Meter w/Device Kit  1 each by Does not apply route 4 times daily           * This list has 2 medication(s) that are the same as other medications prescribed for you. Read the directions carefully, and ask your doctor or other care provider to review them with you. STOP taking these medications      furosemide 80 MG tablet  Commonly known as: LASIX     melatonin 10 MG Caps capsule     ranolazine 1000 MG extended release tablet  Commonly known as: RANEXA            ASK your doctor about these medications      sodium bicarbonate 650 MG tablet  Take 2 tablets by mouth 2 times daily               Where to Get Your Medications        These medications were sent to [facility] [address] [facility], [address], [address] [facility] [address]      Phone: [phone]   amLODIPine 10 MG tablet  atorvastatin 40 MG tablet  bumetanide 1 MG tablet  carvedilol 12.5 MG tablet         Discharge Procedure Orders   Basic Metabolic Panel   Standing Status: Future Standing Exp. Date: 02/06/24     Basic Metabolic Panel   Standing Status: Future Standing Exp. Date: 02/06/24   Order Comments: Fax results to [phone]       Time Spent on discharge is  35 mins in patient examination, evaluation, counseling as well as medication reconciliation, prescriptions for required medications, discharge plan and follow up. Electronically signed by   Acacia Jhaveri MD  2/8/2023  1:52 PM      Thank you Dr. Selena Beal, APRN - CNP for the opportunity to be involved in this patient's care.

## 2023-02-06 NOTE — PROGRESS NOTES
Occupational Therapy  Facility/Department: [facility] 4B STEPDOWN  Occupational Therapy Daily Treatment Note    Name: Ronney Apley  : 1960  MRN: [id number]  Date of Service: 2023    Discharge Recommendations:  Patient would benefit from continued therapy after discharge    Patient Diagnosis(es): The primary encounter diagnosis was Congestive heart failure, unspecified HF chronicity, unspecified heart failure type ([facility] [address] Diagnoses of Hypoxia and Acute kidney injury superimposed on CKD [facility]) were also pertinent to this visit. Past Medical History:  has a past medical history of Arthritis, Backache, unspecified, Cerebral artery occlusion with cerebral infarction ([facility] CHF (congestive heart failure) ([facility] [address] [facility] Chronic kidney disease, Coronary atherosclerosis of artery bypass graft, COVID, Cramp of limb, Gallstones, Hemodialysis patient ([facility] [facility] Hyperlipidemia, Hypertension, Insomnia, Neuromuscular disorder ([facility] Pneumonia, Psychiatric problem, Thyroid disease, Type II or unspecified type diabetes mellitus with renal manifestations, not stated as uncontrolled(250.40), Type II or unspecified type diabetes mellitus without mention of complication, not stated as uncontrolled, and Unspecified vitamin D deficiency. Past Surgical History:  has a past surgical history that includes Coronary artery bypass graft; Knee arthroscopy; Carpal tunnel release; Breast surgery; Tonsillectomy; Hand surgery; Ankle fracture surgery; Cholecystectomy, open (N/A); IR TUNNELED CVC PLACE WO SQ PORT/PUMP > 5 YEARS (2021); AV fistula creation (2021); Dialysis fistula creation (Left, 2021); other surgical history (2022); back surgery; Colonoscopy; eye surgery; fracture surgery; and Cardiac surgery. Assessment   Performance deficits / Impairments: Decreased functional mobility ; Decreased endurance;Decreased coordination;Decreased ADL status; Decreased balance;Decreased strength;Decreased safe awareness;Decreased high-level IADLs;Decreased cognition  Assessment: Pt making steady gains towards goals and is motivated to get well. Pt will continue to benefit from further OT services  Prognosis: Good  Activity Tolerance  Activity Tolerance: Patient Tolerated treatment well        Plan   Occupational Therapy Plan  Times Per Week: 3-5x/week  Current Treatment Recommendations: Strengthening, Balance training, Functional mobility training, Endurance training, Equipment evaluation, education, & procurement, Patient/Caregiver education & training, Safety education & training, Self-Care / ADL, Home management training, Cognitive/Perceptual training, Coordination training     Restrictions  Restrictions/Precautions  Restrictions/Precautions: Fall Risk, Contact Precautions  Required Braces or Orthoses?: No  Position Activity Restriction  Other position/activity restrictions: Up with Assist.  Monitor SpO2. Subjective   General  Chart Reviewed: Yes  Patient assessed for rehabilitation services?: Yes  Response to previous treatment: Patient with no complaints from previous session  Family / Caregiver Present: No  Diagnosis: Respiratory Distress  Subjective  General Comment  Comments: RN ok'd patient for OT treatment this morning, Pt supine in bed upon GARCIA arrival. Pt was agreeable and cooperative. Pt denied pain       Objective   Safety Devices  Type of Devices: Gait belt;Call light within reach;Nurse notified; All fall risk precautions in place; Left in chair  Restraints  Restraints Initially in Place: No  Balance  Sitting: Without support (EOB 5 minutes and upright in recliner 25+ minutes SBA>Supervision)  Standing: With support ( CGA for safety stood ~2 minutes with no LOB)  Transfer Training  Bed to Chair: Contact-guard assistance  Gait  Assistive Device: Walker, rolling;Gait belt        ADL  Grooming: Modified independent ;Setup  Grooming Skilled Clinical Factors: Pt completed oral care, shampoo cap and dry hair, comb hair and don lotion to body  UE Bathing: Modified independent   UE Dressing: Supervision  UE Dressing Skilled Clinical Factors: Doff/don hospital gown  LE Dressing: Moderate assistance; Increased time to complete;Verbal cueing  LE Dressing Skilled Clinical Factors: Sitting EOB, Pt required Assist to doff/don Bilateral socks this date d/t decreased reach to foot level  Additional Comments: Pt completed all tasks above while seated in recliner except for don slipper socks completed at EOB prior to transfer. Pt required 3 30-45 second rest breaks and 4LO2 throughout        Bed mobility  Supine to Sit: Stand by assistance  Sit to Supine: Unable to assess  Scooting: Stand by assistance  Bed Mobility Comments: HOB elevated, bedrail use and  increased time to complete. Pt concluded session seated in recliner  Transfers  Sit to stand: Contact guard assistance  Stand to sit: Contact guard assistance     Cognition  Overall Cognitive Status: WFL  Orientation  Overall Orientation Status: Within Functional Limits         Education Given To: Patient  Education Provided: Role of Therapy;Precautions; Equipment;ADL Adaptive Strategies;Transfer Training;Energy Conservation  Education Provided Comments: Body mechanics, safety  Education Method: Demonstration;Verbal  Barriers to Learning: Vision  Education Outcome: Verbalized understanding;Continued education needed;Demonstrated understanding    AM-PAC Score  AM-[facility] Inpatient Daily Activity Raw Score: 21 (02/06/23 1357)  AM-PAC Inpatient ADL T-Scale Score : 44.27 (02/06/23 [facility])  ADL Inpatient CMS 0-100% Score: 32.79 (02/06/23 1357)  ADL Inpatient CMS G-Code Modifier : CJ (02/06/23 [facility]7)      Goals  Short Term Goals  Time Frame for Short Term Goals: By Discharge, Pt will -  Short Term Goal 1: Demo Mod I and Good Integration of EC // Ax pacing during ADLs. Short Term Goal 2: Demo Sup Assist and Fair+ Integration of AE // DME during LB ADLs and Toileting.   Short Term Goal 3: Complete Functional ADL and Bathroom Transfers with SBA and without LOB. Short Term Goal 4: Demo Functional Mobility (in-room // in-home negotiation) with SBA while maintaining SpO2 >90%.        Therapy Time   Individual Concurrent Group Co-treatment   Time In 9268         Time Out 1030         Minutes 40         Timed Code Treatment Minutes: RADHA Chirinos/L

## 2023-02-07 ENCOUNTER — HOSPITAL ENCOUNTER (OUTPATIENT)
Age: 63
Setting detail: SPECIMEN
Discharge: HOME | End: 2023-02-07

## 2023-02-07 LAB
ALBUMIN SERPL-MCNC: 3 G/DL (ref 3.5–5.2)
ALBUMIN/GLOBULIN RATIO: 0.9 (ref 1–2.5)
ALP SERPL-CCNC: 120 U/L (ref 35–104)
ALT SERPL-CCNC: 11 U/L (ref 5–33)
ANION GAP SERPL CALCULATED.3IONS-SCNC: 15 MMOL/L (ref 9–17)
AST SERPL-CCNC: 12 U/L
BILIRUB SERPL-MCNC: 0.3 MG/DL (ref 0.3–1.2)
BUN SERPL-MCNC: 73 MG/DL (ref 8–23)
CALCIUM SERPL-MCNC: 9.3 MG/DL (ref 8.6–10.4)
CHLORIDE SERPL-SCNC: 97 MMOL/L (ref 98–107)
CO2 SERPL-SCNC: 30 MMOL/L (ref 20–31)
CREAT SERPL-MCNC: 3.35 MG/DL (ref 0.5–0.9)
GFR SERPL CREATININE-BSD FRML MDRD: 15 ML/MIN/1.73M2
GLUCOSE SERPL-MCNC: 281 MG/DL (ref 70–99)
HCT VFR BLD AUTO: 33.5 % (ref 36.3–47.1)
HGB BLD-MCNC: 10.7 G/DL (ref 11.9–15.1)
MCH RBC QN AUTO: 31.3 PG (ref 25.2–33.5)
MCHC RBC AUTO-ENTMCNC: 31.9 G/DL (ref 28.4–34.8)
MCV RBC AUTO: 98 FL (ref 82.6–102.9)
NRBC AUTOMATED: 0 PER 100 WBC
PDW BLD-RTO: 13.7 % (ref 11.8–14.4)
PLATELET # BLD AUTO: 168 K/UL (ref 138–453)
PMV BLD AUTO: 12.1 FL (ref 8.1–13.5)
POTASSIUM SERPL-SCNC: 3.8 MMOL/L (ref 3.7–5.3)
PROT SERPL-MCNC: 6.4 G/DL (ref 6.4–8.3)
RBC # BLD: 3.42 M/UL (ref 3.95–5.11)
SODIUM SERPL-SCNC: 142 MMOL/L (ref 135–144)
URATE SERPL-MCNC: 13.3 MG/DL (ref 2.4–5.7)
WBC # BLD AUTO: 6.1 K/UL (ref 3.5–11.3)

## 2023-02-07 PROCEDURE — 87186 SC STD MICRODIL/AGAR DIL: CPT

## 2023-02-07 PROCEDURE — 80053 COMPREHEN METABOLIC PANEL: CPT

## 2023-02-07 PROCEDURE — 85027 COMPLETE CBC AUTOMATED: CPT

## 2023-02-07 PROCEDURE — 81001 URINALYSIS AUTO W/SCOPE: CPT

## 2023-02-07 PROCEDURE — 84550 ASSAY OF BLOOD/URIC ACID: CPT

## 2023-02-07 PROCEDURE — P9603 ONE-WAY ALLOW PRORATED MILES: HCPCS

## 2023-02-07 PROCEDURE — 36415 COLL VENOUS BLD VENIPUNCTURE: CPT

## 2023-02-07 PROCEDURE — 87077 CULTURE AEROBIC IDENTIFY: CPT

## 2023-02-07 PROCEDURE — 87086 URINE CULTURE/COLONY COUNT: CPT

## 2023-02-08 LAB
BACTERIA: ABNORMAL
BILIRUBIN URINE: NEGATIVE
COLOR: YELLOW
EPITHELIAL CELLS UA: ABNORMAL /HPF (ref 0–5)
GLUCOSE UR STRIP.AUTO-MCNC: NEGATIVE MG/DL
KETONES UR STRIP.AUTO-MCNC: NEGATIVE MG/DL
LEUKOCYTE ESTERASE UR QL STRIP.AUTO: ABNORMAL
MUCUS: ABNORMAL
NITRITE UR QL STRIP.AUTO: NEGATIVE
PROT UR STRIP.AUTO-MCNC: 5.5 MG/DL (ref 5–8)
PROT UR STRIP.AUTO-MCNC: ABNORMAL MG/DL
RBC CLUMPS #/AREA URNS AUTO: ABNORMAL /HPF (ref 0–2)
SPECIFIC GRAVITY UA: 1.02 (ref 1–1.03)
TURBIDITY: CLEAR
URINE HGB: NEGATIVE
UROBILINOGEN, URINE: NORMAL
WBC UA: ABNORMAL /HPF (ref 0–5)
YEAST: ABNORMAL

## 2023-02-09 LAB
MICROORGANISM SPEC CULT: ABNORMAL
SERVICE CMNT-IMP: ABNORMAL
SPECIMEN DESCRIPTION: ABNORMAL

## 2023-02-11 ENCOUNTER — TELEPHONE (OUTPATIENT)
Dept: OTHER | Facility: CLINIC | Age: 63
End: 2023-02-11

## 2023-02-11 ENCOUNTER — APPOINTMENT (OUTPATIENT)
Dept: GENERAL RADIOLOGY | Age: 63
DRG: 247 | End: 2023-02-11
Payer: COMMERCIAL

## 2023-02-11 ENCOUNTER — APPOINTMENT (OUTPATIENT)
Dept: CT IMAGING | Age: 63
DRG: 247 | End: 2023-02-11
Payer: COMMERCIAL

## 2023-02-11 ENCOUNTER — HOSPITAL ENCOUNTER (INPATIENT)
Age: 63
LOS: 6 days | Discharge: OTHER INSTITUTION | DRG: 247 | End: 2023-02-17
Attending: STUDENT IN AN ORGANIZED HEALTH CARE EDUCATION/TRAINING PROGRAM | Admitting: STUDENT IN AN ORGANIZED HEALTH CARE EDUCATION/TRAINING PROGRAM
Payer: COMMERCIAL

## 2023-02-11 DIAGNOSIS — R55 SYNCOPE AND COLLAPSE: Primary | ICD-10-CM

## 2023-02-11 LAB
ABSOLUTE EOS #: 0.3 K/UL (ref 0–0.4)
ABSOLUTE LYMPH #: 1 K/UL (ref 1–4.8)
ABSOLUTE MONO #: 0.5 K/UL (ref 0.1–1.3)
ANION GAP SERPL CALCULATED.3IONS-SCNC: 10 MMOL/L (ref 9–17)
BASOPHILS # BLD: 0 % (ref 0–2)
BASOPHILS ABSOLUTE: 0 K/UL (ref 0–0.2)
BNP SERPL-MCNC: 2406 PG/ML
BUN SERPL-MCNC: 69 MG/DL (ref 8–23)
CALCIUM SERPL-MCNC: 9 MG/DL (ref 8.6–10.4)
CHLORIDE SERPL-SCNC: 100 MMOL/L (ref 98–107)
CO2 SERPL-SCNC: 34 MMOL/L (ref 20–31)
CREAT SERPL-MCNC: 3.04 MG/DL (ref 0.5–0.9)
EOSINOPHILS RELATIVE PERCENT: 3 % (ref 0–4)
GFR SERPL CREATININE-BSD FRML MDRD: 17 ML/MIN/1.73M2
GLUCOSE SERPL-MCNC: 127 MG/DL (ref 70–99)
HCT VFR BLD AUTO: 35.8 % (ref 36–46)
HGB BLD-MCNC: 11.9 G/DL (ref 12–16)
LYMPHOCYTES # BLD: 11 % (ref 24–44)
MCH RBC QN AUTO: 31.3 PG (ref 26–34)
MCHC RBC AUTO-ENTMCNC: 33.2 G/DL (ref 31–37)
MCV RBC AUTO: 94.3 FL (ref 80–100)
MONOCYTES # BLD: 6 % (ref 1–7)
PDW BLD-RTO: 15.2 % (ref 11.5–14.9)
PLATELET # BLD AUTO: 212 K/UL (ref 150–450)
PMV BLD AUTO: 8.9 FL (ref 6–12)
POTASSIUM SERPL-SCNC: 3.5 MMOL/L (ref 3.7–5.3)
RBC # BLD: 3.8 M/UL (ref 4–5.2)
SEG NEUTROPHILS: 80 % (ref 36–66)
SEGMENTED NEUTROPHILS ABSOLUTE COUNT: 6.9 K/UL (ref 1.3–9.1)
SODIUM SERPL-SCNC: 144 MMOL/L (ref 135–144)
TROPONIN I SERPL DL<=0.01 NG/ML-MCNC: 78 NG/L (ref 0–14)
TROPONIN I SERPL DL<=0.01 NG/ML-MCNC: 87 NG/L (ref 0–14)
WBC # BLD AUTO: 8.7 K/UL (ref 3.5–11)

## 2023-02-11 PROCEDURE — 70450 CT HEAD/BRAIN W/O DYE: CPT

## 2023-02-11 PROCEDURE — 73030 X-RAY EXAM OF SHOULDER: CPT

## 2023-02-11 PROCEDURE — 85025 COMPLETE CBC W/AUTO DIFF WBC: CPT

## 2023-02-11 PROCEDURE — 1200000000 HC SEMI PRIVATE

## 2023-02-11 PROCEDURE — 84484 ASSAY OF TROPONIN QUANT: CPT

## 2023-02-11 PROCEDURE — 99285 EMERGENCY DEPT VISIT HI MDM: CPT

## 2023-02-11 PROCEDURE — 73502 X-RAY EXAM HIP UNI 2-3 VIEWS: CPT

## 2023-02-11 PROCEDURE — 80048 BASIC METABOLIC PNL TOTAL CA: CPT

## 2023-02-11 PROCEDURE — 73590 X-RAY EXAM OF LOWER LEG: CPT

## 2023-02-11 PROCEDURE — 93005 ELECTROCARDIOGRAM TRACING: CPT | Performed by: STUDENT IN AN ORGANIZED HEALTH CARE EDUCATION/TRAINING PROGRAM

## 2023-02-11 PROCEDURE — 71045 X-RAY EXAM CHEST 1 VIEW: CPT

## 2023-02-11 PROCEDURE — 83880 ASSAY OF NATRIURETIC PEPTIDE: CPT

## 2023-02-11 PROCEDURE — 36415 COLL VENOUS BLD VENIPUNCTURE: CPT

## 2023-02-11 ASSESSMENT — PAIN DESCRIPTION - PAIN TYPE: TYPE: CHRONIC PAIN

## 2023-02-11 ASSESSMENT — PAIN SCALES - GENERAL: PAINLEVEL_OUTOF10: 6

## 2023-02-11 ASSESSMENT — PAIN - FUNCTIONAL ASSESSMENT: PAIN_FUNCTIONAL_ASSESSMENT: 0-10

## 2023-02-11 ASSESSMENT — PAIN DESCRIPTION - LOCATION: LOCATION: NECK;BACK;SHOULDER;LEG

## 2023-02-11 ASSESSMENT — PAIN DESCRIPTION - DESCRIPTORS: DESCRIPTORS: ACHING;DISCOMFORT;PRESSURE

## 2023-02-12 PROBLEM — R05.2 SUBACUTE COUGH: Status: ACTIVE | Noted: 2023-02-12

## 2023-02-12 LAB
ABSOLUTE EOS #: 0.3 K/UL (ref 0–0.4)
ABSOLUTE LYMPH #: 1.2 K/UL (ref 1–4.8)
ABSOLUTE MONO #: 0.6 K/UL (ref 0.1–1.3)
ANION GAP SERPL CALCULATED.3IONS-SCNC: 11 MMOL/L (ref 9–17)
BASOPHILS # BLD: 1 % (ref 0–2)
BASOPHILS ABSOLUTE: 0 K/UL (ref 0–0.2)
BUN SERPL-MCNC: 65 MG/DL (ref 8–23)
CALCIUM SERPL-MCNC: 8.8 MG/DL (ref 8.6–10.4)
CHLORIDE SERPL-SCNC: 101 MMOL/L (ref 98–107)
CO2 SERPL-SCNC: 33 MMOL/L (ref 20–31)
CREAT SERPL-MCNC: 2.96 MG/DL (ref 0.5–0.9)
EOSINOPHILS RELATIVE PERCENT: 4 % (ref 0–4)
GFR SERPL CREATININE-BSD FRML MDRD: 17 ML/MIN/1.73M2
GLUCOSE BLD-MCNC: 104 MG/DL (ref 65–105)
GLUCOSE BLD-MCNC: 111 MG/DL (ref 65–105)
GLUCOSE BLD-MCNC: 253 MG/DL (ref 65–105)
GLUCOSE BLD-MCNC: 303 MG/DL (ref 65–105)
GLUCOSE BLD-MCNC: 322 MG/DL (ref 65–105)
GLUCOSE SERPL-MCNC: 93 MG/DL (ref 70–99)
HCT VFR BLD AUTO: 31.9 % (ref 36–46)
HGB BLD-MCNC: 10.6 G/DL (ref 12–16)
LYMPHOCYTES # BLD: 16 % (ref 24–44)
MAGNESIUM SERPL-MCNC: 2.2 MG/DL (ref 1.6–2.6)
MCH RBC QN AUTO: 31.4 PG (ref 26–34)
MCHC RBC AUTO-ENTMCNC: 33.3 G/DL (ref 31–37)
MCV RBC AUTO: 94.4 FL (ref 80–100)
MONOCYTES # BLD: 7 % (ref 1–7)
PDW BLD-RTO: 15 % (ref 11.5–14.9)
PLATELET # BLD AUTO: 197 K/UL (ref 150–450)
PMV BLD AUTO: 9 FL (ref 6–12)
POTASSIUM SERPL-SCNC: 3.4 MMOL/L (ref 3.7–5.3)
RBC # BLD: 3.38 M/UL (ref 4–5.2)
SEG NEUTROPHILS: 72 % (ref 36–66)
SEGMENTED NEUTROPHILS ABSOLUTE COUNT: 5.4 K/UL (ref 1.3–9.1)
SODIUM SERPL-SCNC: 145 MMOL/L (ref 135–144)
TROPONIN I SERPL DL<=0.01 NG/ML-MCNC: 89 NG/L (ref 0–14)
TROPONIN I SERPL DL<=0.01 NG/ML-MCNC: 90 NG/L (ref 0–14)
WBC # BLD AUTO: 7.6 K/UL (ref 3.5–11)

## 2023-02-12 PROCEDURE — 80048 BASIC METABOLIC PNL TOTAL CA: CPT

## 2023-02-12 PROCEDURE — 1200000000 HC SEMI PRIVATE

## 2023-02-12 PROCEDURE — 99223 1ST HOSP IP/OBS HIGH 75: CPT | Performed by: PSYCHIATRY & NEUROLOGY

## 2023-02-12 PROCEDURE — 83735 ASSAY OF MAGNESIUM: CPT

## 2023-02-12 PROCEDURE — 6370000000 HC RX 637 (ALT 250 FOR IP): Performed by: STUDENT IN AN ORGANIZED HEALTH CARE EDUCATION/TRAINING PROGRAM

## 2023-02-12 PROCEDURE — 84484 ASSAY OF TROPONIN QUANT: CPT

## 2023-02-12 PROCEDURE — 2580000003 HC RX 258: Performed by: STUDENT IN AN ORGANIZED HEALTH CARE EDUCATION/TRAINING PROGRAM

## 2023-02-12 PROCEDURE — 99223 1ST HOSP IP/OBS HIGH 75: CPT | Performed by: STUDENT IN AN ORGANIZED HEALTH CARE EDUCATION/TRAINING PROGRAM

## 2023-02-12 PROCEDURE — 85025 COMPLETE CBC W/AUTO DIFF WBC: CPT

## 2023-02-12 PROCEDURE — 6370000000 HC RX 637 (ALT 250 FOR IP): Performed by: INTERNAL MEDICINE

## 2023-02-12 PROCEDURE — 36415 COLL VENOUS BLD VENIPUNCTURE: CPT

## 2023-02-12 PROCEDURE — 82947 ASSAY GLUCOSE BLOOD QUANT: CPT

## 2023-02-12 PROCEDURE — 6360000002 HC RX W HCPCS: Performed by: STUDENT IN AN ORGANIZED HEALTH CARE EDUCATION/TRAINING PROGRAM

## 2023-02-12 RX ORDER — AMLODIPINE BESYLATE 10 MG/1
10 TABLET ORAL DAILY
Status: DISCONTINUED | OUTPATIENT
Start: 2023-02-12 | End: 2023-02-12

## 2023-02-12 RX ORDER — CARVEDILOL 6.25 MG/1
6.25 TABLET ORAL 2 TIMES DAILY
Status: DISCONTINUED | OUTPATIENT
Start: 2023-02-12 | End: 2023-02-17 | Stop reason: HOSPADM

## 2023-02-12 RX ORDER — LANOLIN ALCOHOL/MO/W.PET/CERES
10 CREAM (GRAM) TOPICAL NIGHTLY PRN
COMMUNITY

## 2023-02-12 RX ORDER — ASPIRIN 81 MG/1
81 TABLET, CHEWABLE ORAL DAILY
Status: DISCONTINUED | OUTPATIENT
Start: 2023-02-12 | End: 2023-02-17 | Stop reason: HOSPADM

## 2023-02-12 RX ORDER — CARVEDILOL 12.5 MG/1
12.5 TABLET ORAL 2 TIMES DAILY
Status: DISCONTINUED | OUTPATIENT
Start: 2023-02-12 | End: 2023-02-12

## 2023-02-12 RX ORDER — BUMETANIDE 1 MG/1
2 TABLET ORAL 2 TIMES DAILY
Status: DISCONTINUED | OUTPATIENT
Start: 2023-02-12 | End: 2023-02-15

## 2023-02-12 RX ORDER — SODIUM BICARBONATE 650 MG/1
1300 TABLET ORAL 2 TIMES DAILY
Status: DISCONTINUED | OUTPATIENT
Start: 2023-02-12 | End: 2023-02-15

## 2023-02-12 RX ORDER — DEXTROSE MONOHYDRATE 100 MG/ML
INJECTION, SOLUTION INTRAVENOUS CONTINUOUS PRN
Status: DISCONTINUED | OUTPATIENT
Start: 2023-02-12 | End: 2023-02-17 | Stop reason: HOSPADM

## 2023-02-12 RX ORDER — ATORVASTATIN CALCIUM 40 MG/1
40 TABLET, FILM COATED ORAL NIGHTLY
Status: DISCONTINUED | OUTPATIENT
Start: 2023-02-12 | End: 2023-02-14

## 2023-02-12 RX ORDER — HEPARIN SODIUM 5000 [USP'U]/ML
5000 INJECTION, SOLUTION INTRAVENOUS; SUBCUTANEOUS EVERY 8 HOURS SCHEDULED
Status: DISCONTINUED | OUTPATIENT
Start: 2023-02-12 | End: 2023-02-17 | Stop reason: HOSPADM

## 2023-02-12 RX ORDER — PANTOPRAZOLE SODIUM 40 MG/1
40 TABLET, DELAYED RELEASE ORAL
Status: DISCONTINUED | OUTPATIENT
Start: 2023-02-12 | End: 2023-02-17 | Stop reason: HOSPADM

## 2023-02-12 RX ORDER — SODIUM CHLORIDE 0.9 % (FLUSH) 0.9 %
5-40 SYRINGE (ML) INJECTION PRN
Status: DISCONTINUED | OUTPATIENT
Start: 2023-02-12 | End: 2023-02-17 | Stop reason: HOSPADM

## 2023-02-12 RX ORDER — INSULIN LISPRO 100 [IU]/ML
0-8 INJECTION, SOLUTION INTRAVENOUS; SUBCUTANEOUS
Status: DISCONTINUED | OUTPATIENT
Start: 2023-02-12 | End: 2023-02-12

## 2023-02-12 RX ORDER — ACETAMINOPHEN 650 MG/1
650 SUPPOSITORY RECTAL EVERY 6 HOURS PRN
Status: DISCONTINUED | OUTPATIENT
Start: 2023-02-12 | End: 2023-02-17 | Stop reason: HOSPADM

## 2023-02-12 RX ORDER — BENZONATATE 100 MG/1
100 CAPSULE ORAL 3 TIMES DAILY PRN
Status: DISCONTINUED | OUTPATIENT
Start: 2023-02-12 | End: 2023-02-16

## 2023-02-12 RX ORDER — INSULIN LISPRO 100 [IU]/ML
0-4 INJECTION, SOLUTION INTRAVENOUS; SUBCUTANEOUS NIGHTLY
Status: DISCONTINUED | OUTPATIENT
Start: 2023-02-12 | End: 2023-02-17 | Stop reason: HOSPADM

## 2023-02-12 RX ORDER — SODIUM CHLORIDE 0.9 % (FLUSH) 0.9 %
5-40 SYRINGE (ML) INJECTION EVERY 12 HOURS SCHEDULED
Status: DISCONTINUED | OUTPATIENT
Start: 2023-02-12 | End: 2023-02-17 | Stop reason: HOSPADM

## 2023-02-12 RX ORDER — GABAPENTIN 300 MG/1
300 CAPSULE ORAL DAILY
Status: DISCONTINUED | OUTPATIENT
Start: 2023-02-12 | End: 2023-02-17 | Stop reason: HOSPADM

## 2023-02-12 RX ORDER — INSULIN LISPRO 100 [IU]/ML
0-16 INJECTION, SOLUTION INTRAVENOUS; SUBCUTANEOUS
Status: DISCONTINUED | OUTPATIENT
Start: 2023-02-12 | End: 2023-02-17 | Stop reason: HOSPADM

## 2023-02-12 RX ORDER — ACETAMINOPHEN 325 MG/1
650 TABLET ORAL EVERY 6 HOURS PRN
Status: DISCONTINUED | OUTPATIENT
Start: 2023-02-12 | End: 2023-02-17 | Stop reason: HOSPADM

## 2023-02-12 RX ORDER — POLYETHYLENE GLYCOL 3350 17 G/17G
17 POWDER, FOR SOLUTION ORAL DAILY PRN
Status: DISCONTINUED | OUTPATIENT
Start: 2023-02-12 | End: 2023-02-17 | Stop reason: HOSPADM

## 2023-02-12 RX ORDER — ALLOPURINOL 100 MG/1
100 TABLET ORAL DAILY
Status: DISCONTINUED | OUTPATIENT
Start: 2023-02-12 | End: 2023-02-17 | Stop reason: HOSPADM

## 2023-02-12 RX ORDER — TAMSULOSIN HYDROCHLORIDE 0.4 MG/1
0.4 CAPSULE ORAL DAILY
Status: DISCONTINUED | OUTPATIENT
Start: 2023-02-12 | End: 2023-02-16

## 2023-02-12 RX ORDER — INSULIN GLARGINE 100 [IU]/ML
45 INJECTION, SOLUTION SUBCUTANEOUS 2 TIMES DAILY
Status: DISCONTINUED | OUTPATIENT
Start: 2023-02-12 | End: 2023-02-17 | Stop reason: HOSPADM

## 2023-02-12 RX ORDER — INSULIN LISPRO 100 [IU]/ML
0-4 INJECTION, SOLUTION INTRAVENOUS; SUBCUTANEOUS NIGHTLY
Status: DISCONTINUED | OUTPATIENT
Start: 2023-02-12 | End: 2023-02-12

## 2023-02-12 RX ORDER — POTASSIUM CHLORIDE 20 MEQ/1
30 TABLET, EXTENDED RELEASE ORAL ONCE
Status: COMPLETED | OUTPATIENT
Start: 2023-02-12 | End: 2023-02-12

## 2023-02-12 RX ORDER — AMLODIPINE BESYLATE 5 MG/1
5 TABLET ORAL DAILY
Status: DISCONTINUED | OUTPATIENT
Start: 2023-02-12 | End: 2023-02-17 | Stop reason: HOSPADM

## 2023-02-12 RX ORDER — ONDANSETRON 4 MG/1
4 TABLET, ORALLY DISINTEGRATING ORAL EVERY 8 HOURS PRN
Status: DISCONTINUED | OUTPATIENT
Start: 2023-02-12 | End: 2023-02-17 | Stop reason: HOSPADM

## 2023-02-12 RX ORDER — SODIUM CHLORIDE 9 MG/ML
INJECTION, SOLUTION INTRAVENOUS PRN
Status: DISCONTINUED | OUTPATIENT
Start: 2023-02-12 | End: 2023-02-17 | Stop reason: HOSPADM

## 2023-02-12 RX ORDER — ONDANSETRON 2 MG/ML
4 INJECTION INTRAMUSCULAR; INTRAVENOUS EVERY 6 HOURS PRN
Status: DISCONTINUED | OUTPATIENT
Start: 2023-02-12 | End: 2023-02-17 | Stop reason: HOSPADM

## 2023-02-12 RX ORDER — DOCUSATE SODIUM 100 MG/1
100 CAPSULE, LIQUID FILLED ORAL 2 TIMES DAILY
Status: DISCONTINUED | OUTPATIENT
Start: 2023-02-12 | End: 2023-02-17 | Stop reason: HOSPADM

## 2023-02-12 RX ORDER — DEXTROMETHORPHAN POLISTIREX 30 MG/5ML
30 SUSPENSION ORAL EVERY 12 HOURS SCHEDULED
Status: DISCONTINUED | OUTPATIENT
Start: 2023-02-12 | End: 2023-02-14

## 2023-02-12 RX ADMIN — BUMETANIDE 2 MG: 1 TABLET ORAL at 21:12

## 2023-02-12 RX ADMIN — AMLODIPINE BESYLATE 5 MG: 5 TABLET ORAL at 08:40

## 2023-02-12 RX ADMIN — ATORVASTATIN CALCIUM 40 MG: 40 TABLET, FILM COATED ORAL at 21:12

## 2023-02-12 RX ADMIN — SODIUM CHLORIDE, PRESERVATIVE FREE 10 ML: 5 INJECTION INTRAVENOUS at 08:44

## 2023-02-12 RX ADMIN — HEPARIN SODIUM 5000 UNITS: 5000 INJECTION INTRAVENOUS; SUBCUTANEOUS at 05:34

## 2023-02-12 RX ADMIN — LINACLOTIDE 145 MCG: 145 CAPSULE, GELATIN COATED ORAL at 08:43

## 2023-02-12 RX ADMIN — HEPARIN SODIUM 5000 UNITS: 5000 INJECTION INTRAVENOUS; SUBCUTANEOUS at 16:25

## 2023-02-12 RX ADMIN — CARVEDILOL 6.25 MG: 6.25 TABLET, FILM COATED ORAL at 08:40

## 2023-02-12 RX ADMIN — ASPIRIN 81 MG: 81 TABLET, CHEWABLE ORAL at 08:40

## 2023-02-12 RX ADMIN — INSULIN LISPRO 12 UNITS: 100 INJECTION, SOLUTION INTRAVENOUS; SUBCUTANEOUS at 18:00

## 2023-02-12 RX ADMIN — SODIUM BICARBONATE 1300 MG: 650 TABLET ORAL at 08:40

## 2023-02-12 RX ADMIN — ACETAMINOPHEN 650 MG: 325 TABLET ORAL at 16:25

## 2023-02-12 RX ADMIN — PANTOPRAZOLE SODIUM 40 MG: 40 TABLET, DELAYED RELEASE ORAL at 05:34

## 2023-02-12 RX ADMIN — POTASSIUM CHLORIDE 30 MEQ: 1500 TABLET, EXTENDED RELEASE ORAL at 19:10

## 2023-02-12 RX ADMIN — BENZONATATE 100 MG: 100 CAPSULE ORAL at 19:10

## 2023-02-12 RX ADMIN — INSULIN GLARGINE 45 UNITS: 100 INJECTION, SOLUTION SUBCUTANEOUS at 12:23

## 2023-02-12 RX ADMIN — INSULIN LISPRO 12 UNITS: 100 INJECTION, SOLUTION INTRAVENOUS; SUBCUTANEOUS at 12:23

## 2023-02-12 RX ADMIN — INSULIN GLARGINE 45 UNITS: 100 INJECTION, SOLUTION SUBCUTANEOUS at 21:13

## 2023-02-12 RX ADMIN — GABAPENTIN 300 MG: 300 CAPSULE ORAL at 08:40

## 2023-02-12 RX ADMIN — ALLOPURINOL 100 MG: 100 TABLET ORAL at 08:40

## 2023-02-12 RX ADMIN — CARVEDILOL 6.25 MG: 6.25 TABLET, FILM COATED ORAL at 21:13

## 2023-02-12 RX ADMIN — DOCUSATE SODIUM 100 MG: 100 CAPSULE, LIQUID FILLED ORAL at 08:40

## 2023-02-12 RX ADMIN — HEPARIN SODIUM 5000 UNITS: 5000 INJECTION INTRAVENOUS; SUBCUTANEOUS at 21:13

## 2023-02-12 RX ADMIN — DOCUSATE SODIUM 100 MG: 100 CAPSULE, LIQUID FILLED ORAL at 21:12

## 2023-02-12 RX ADMIN — SODIUM BICARBONATE 1300 MG: 650 TABLET ORAL at 21:12

## 2023-02-12 RX ADMIN — Medication 30 MG: at 21:13

## 2023-02-12 RX ADMIN — TAMSULOSIN HYDROCHLORIDE 0.4 MG: 0.4 CAPSULE ORAL at 08:40

## 2023-02-12 RX ADMIN — ACETAMINOPHEN 650 MG: 325 TABLET ORAL at 08:43

## 2023-02-12 ASSESSMENT — ENCOUNTER SYMPTOMS
COUGH: 0
COUGH: 1
DIARRHEA: 1
CHEST TIGHTNESS: 0
VOMITING: 0
SORE THROAT: 0
DIARRHEA: 0
ABDOMINAL DISTENTION: 0
NAUSEA: 0
BLOOD IN STOOL: 0
RHINORRHEA: 0
SHORTNESS OF BREATH: 1
TROUBLE SWALLOWING: 0
ABDOMINAL PAIN: 0
BACK PAIN: 0
SHORTNESS OF BREATH: 0
CONSTIPATION: 0

## 2023-02-12 NOTE — PROGRESS NOTES
02/12/23 1022   Encounter Summary   Support System [facility]   Plan and Referrals   Plan/Referrals Contacted support as requested per patient/family request Plan  ([id number] [facility] [phone])

## 2023-02-12 NOTE — DISCHARGE INSTR - COC
Continuity of Care Form    Patient Name: Jamel Clayton   :  1960  MRN:  [id number]    Admit date:  2023  Discharge date:  23    Code Status Order: Full Code   Advance Directives:     Admitting Physician:  Sofya Villa MD  PCP: AFSANEH Cope CNP    Discharging Nurse: Tacho Mccloud Unit/Room#: 6163/3051-67  Discharging Unit Phone Number: [phone]    Emergency Contact:   Extended Emergency Contact Information  Primary Emergency Contact:  [address], [address] Phone: [phone]  Relation: Brother/Sister  Secondary Emergency Contact: Ming Cardona, [address] Phone: [phone]  Relation: Brother/Sister    Past Surgical History:  Past Surgical History:   Procedure Laterality Date    ANKLE FRACTURE SURGERY      AV FISTULA CREATION  2021    BACK SURGERY      BREAST SURGERY      CARDIAC SURGERY      CARPAL TUNNEL RELEASE      CHOLECYSTECTOMY, OPEN N/A     COLONOSCOPY      CORONARY ARTERY BYPASS GRAFT      x3    DIALYSIS FISTULA CREATION Left 2021    LEFT AV FISTULA CREATION UPPER EXTREMITY performed by Gregory Giron MD at [address]      IR TUNNELED [address] 5 YEARS  2021    IR TUNNELED CATHETER PLACEMENT GREATER THAN 5 YEARS 2021 [facility] SPECIAL PROCEDURES    KNEE ARTHROSCOPY      right    OTHER SURGICAL HISTORY  2022    cvc exchange    TONSILLECTOMY         Immunization History:   Immunization History   Administered Date(s) Administered    COVID-19, MODERNA Booster BLUE border, (age 18y+), IM, 50mcg/0.25mL 2022    COVID-19, PFIZER PURPLE top, DILUTE for use, (age 15 y+), 30mcg/0.3mL 2021, 2021    Influenza Virus Vaccine 10/18/2018, 09/10/2019, 2020    Influenza, AFLURIA (age 1 yrs+), FLUZONE, (age 10 mo+), MDV, 0.5mL 10/16/2017    Influenza, FLUARIX, FLULAVAL, FLUZONE (age 10 mo+) AND AFLURIA, (age 1 y+), PF, 0.5mL 10/18/2018, 09/10/2019, 09/20/2019, 11/03/2020    Influenza, FLUCELVAX, (age 10 mo+), MDCK, PF, 0.5mL 09/30/2021    Pneumococcal Conjugate 13-valent ([id number]) 08/06/2019    Pneumococcal Polysaccharide ([id number]) 10/30/2014    Tdap (Boostrix, Adacel) 11/18/2017    Zoster Recombinant (Shingrix) 09/23/2022, 01/10/2023       Active Problems:  Patient Active Problem List   Diagnosis Code    Atherosclerosis of coronary artery bypass graft of native heart without angina pectoris I25.810    Acute kidney injury superimposed on CKD ([facility] [address] N17.9, N18.9    Acute on chronic diastolic heart failure ([facility]) I50.33    Diabetic polyneuropathy associated with type 2 diabetes mellitus ([facility]) E11.42    History of coronary artery bypass graft Z95.1    Iron deficiency anemia D50.9    Spinal stenosis of lumbar region with neurogenic claudication M48.062    Mixed hyperlipidemia E78.2    CKD (chronic kidney disease) stage 4, GFR 15-29 ml/min ([facility]) N18.4    Type 2 diabetes 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, with long-term current use of insulin ([facility]) E11.22, N18.6, Z99.2, Z79.4    Obesity, Class II, BMI 35-39.9 E66.9    Thyroid nodule greater than or equal to 1 cm in diameter incidentally noted on imaging study E04.1    Hypertension, essential I10    Chronic ischemic heart disease I25.9    Ischemic stroke of frontal lobe ([facility]) I63.9    Morbid obesity with BMI of 40.0-44.9, adult ([facility]) E66.01, Z68.41    Disequilibrium syndrome E87.8    Anxiety F41.9    Chronic midline low back pain with bilateral sciatica M54.41, M54.42, G89.29    COVID U07.1    Cerebral hypoperfusion I67.9    Immature arteriovenous fistula ([facility]) I77.0    History of fusion of cervical spine Z98.1    Depression with anxiety F41.8    Vancomycin resistant Enterococcus UTI A49.1, Z16.21    Dialysis disequilibrium syndrome E87.8    VRE infection (vancomycin resistant Enterococcus) A49.1, Z16.21    Infection due to ESBL-producing Klebsiella pneumoniae A49.8, Z16.12    Class 2 severe obesity due to excess calories with serious comorbidity and body mass index (BMI) of 35.0 to 35.9 in adult [facility]) E66.01, Z68.35    Type 2 diabetes mellitus with hyperglycemia, with long-term current use of insulin ([facility]) E11.65, Z79.4    Right hemiparesis ([facility]) G81.91    Ulcer of left foot, limited to breakdown of skin ([address] L97.521    Secondary hyperparathyroidism ([facility]) N25.81    Hypertensive urgency I16.0    Hypoxia R09.02    Congestive heart failure ([facility]) I50.9    Nephrotic range proteinuria R80.9    Acute respiratory failure with hypoxia ([facility]) J96.01    Syncope and collapse R55       Isolation/Infection:   Isolation            Contact          Patient Infection Status       Infection Onset Added Last Indicated Last Indicated By Review Planned Expiration Resolved Resolved By    ESBL (Extended Spectrum Beta Lactamase) 08/02/22 08/05/22 08/02/22 Culture, Urine        MDRO (multi-drug resistant organism) 08/02/22 08/05/22 08/02/22 Culture, Urine        VRE 08/02/22 08/04/22 08/02/22 Culture, Urine        Urine- 8/22      ESBL (Extended Spectrum Beta Lactamase) 07/02/22 07/04/22 08/02/22 Culture, Urine        MDRO (multi-drug resistant organism) 07/02/22 07/04/22 08/02/22 Culture, Urine        VRE 05/09/22 05/13/22 08/02/22 Culture, Urine        MRSA 08/03/20 08/13/21 08/13/21 Jamila Dixon RN        Added from external infection.     Resolved    COVID-19 (Rule Out) 08/05/22 08/05/22 08/05/22 COVID-19, Rapid (Ordered)   08/08/22 Rule-Out Test Resulted    COVID-19 (Rule Out) 06/18/22 06/18/22 06/18/22 COVID-19, Rapid (Ordered)   06/18/22 Rule-Out Test Resulted    COVID-19 (Rule Out) 06/06/22 06/06/22 06/06/22 COVID-19 & Influenza Combo (Ordered)   06/06/22 Rule-Out Test Resulted    COVID-19 (Rule Out) 04/14/22 04/14/22 04/14/22 COVID-19 & Influenza Combo (Ordered)   04/15/22 Valentin Mercado RN    COVID-19 (Rule Out) 04/13/22 04/13/22 04/13/22 COVID-19 & Influenza Combo (Ordered)   04/13/22 Rule-Out Test Resulted COVID-19 (Rule Out) 22 COVID-19 (Ordered)   22 Rule-Out Test Resulted    COVID-19 22 COVID-19   22     COVID-19 (Rule Out) 22 COVID-19 (Ordered)   22 Rule-Out Test Resulted            Nurse Assessment:  Last Vital Signs: /68   Pulse 65   Temp 98.4 °F (36.9 °C)   Resp 20   Wt 248 lb (112.5 kg)   SpO2 90%   BMI 41.27 kg/m²     Last documented pain score (0-10 scale): Pain Level: 6  Last Weight:   Wt Readings from Last 1 Encounters:   23 248 lb (112.5 kg)     Mental Status:  oriented, alert, coherent, and logical    IV Access:  - None    Nursing Mobility/ADLs:  Walking   Assisted  Transfer  Assisted  Bathing  Assisted  Dressing  Assisted  Toileting  Assisted  Feeding  Independent  Med Admin  Independent  Med Delivery   whole    Wound Care Documentation and Therapy:        Elimination:  Continence: Bowel: Yes  Bladder: Yes  Urinary Catheter: None   Colostomy/Ileostomy/Ileal Conduit: No       Date of Last BM:     Intake/Output Summary (Last 24 hours) at 2023 1551  Last data filed at 2023 1534  Gross per 24 hour   Intake --   Output 2 ml   Net -2 ml     No intake/output data recorded. Safety Concerns: At Risk for Falls    Impairments/Disabilities:      None    Nutrition Therapy:  Current Nutrition Therapy:   - Oral Diet:  General    Routes of Feeding: Oral  Liquids: No Restrictions  Daily Fluid Restriction: no  Last Modified Barium Swallow with Video (Video Swallowing Test): not done    Treatments at the Time of Hospital Discharge:   Respiratory Treatments: n/a  Oxygen Therapy:  is on oxygen at 3 L/min per nasal cannula. Ventilator:    - No ventilator support    Rehab Therapies: Physical Therapy and Occupational Therapy  Weight Bearing Status/Restrictions: No weight bearing restrictions  Other Medical Equipment (for information only, NOT a DME order):     Other Treatments: Skilled Nursing assessment and monitoring. Medication education and monitoring per protocol. Resume previous orders    Patient's personal belongings (please select all that are sent with patient):  None    RN SIGNATURE:  Electronically signed by Shital Fleming RN on 2/17/23 at 3:13 PM EST    CASE MANAGEMENT/SOCIAL WORK SECTION    Inpatient Status Date: 2/11/23    Readmission Risk Assessment Score:  Readmission Risk              Risk of Unplanned Readmission:  37           Discharging to Facility/ [facility] [address] Phone: [phone]  Fax: [phone]      Dialysis Facility (if applicable)   Name:  Address:  Dialysis Schedule:  Phone:  Fax:    / signature: Electronically signed by Candace Costello RN on 2/12/23 at 3:51 PM EST    PHYSICIAN SECTION    Prognosis: Fair    Condition at Discharge: Stable    Rehab Potential (if transferring to Rehab): Fair    Recommended Labs or Other Treatments After Discharge:     Physician Certification: I certify the above information and transfer of Tadeo Price  is necessary for the continuing treatment of the diagnosis listed and that she requires [facility] for greater 30 days.      Update Admission H&P: No change in H&P    PHYSICIAN SIGNATURE:  Electronically signed by Lalito Sage MD on 2/17/23 at 4:20 PM EST

## 2023-02-12 NOTE — CONSULTS
[facility]  In Patient Cardiology Consult             Date:   2/12/2023  Patient name: Aleksandar Hoang  Date of admission:  2/11/2023  7:52 PM  MRN:   [id number]  YOB: 1960    Reason for Admission: Syncope    CHIEF COMPLAINT: Syncope    History Obtained From: The patient and chart    HISTORY OF PRESENT ILLNESS:    This is a 75-year-old female. She was at [facility] facility. Over there she had episode of syncope. Per the patient her blood pressure was not very high over the past few days. She had a number of her blood pressure medications being held. She does not recall completely what occurred. Cardiology was consulted due to syncope. I was notified overnight that she had some bradycardia with a heart rate in the 50s. She denies any chest pains, shortness of breath, orthopnea, PND, lower extremity edema.     Past Medical History:    Past Medical History:   Diagnosis Date    Arthritis     Backache, unspecified     Cerebral artery occlusion with cerebral infarction (HCC)     CHF (congestive heart failure) (HCC)     Chronic kidney disease     Coronary atherosclerosis of artery bypass graft     COVID 1/31/2022    Cramp of limb     Gallstones     Hemodialysis patient ([facility] [address]     Hyperlipidemia     Hypertension     Insomnia     Neuromuscular disorder ([facility] [address]     Pneumonia     Psychiatric problem     Thyroid disease     Type II or unspecified type diabetes mellitus with renal manifestations, not stated as uncontrolled(250.40)     Type II or unspecified type diabetes mellitus without mention of complication, not stated as uncontrolled     Unspecified vitamin D deficiency          Past Surgical History:    Past Surgical History:   Procedure Laterality Date    ANKLE FRACTURE SURGERY      AV FISTULA CREATION  12/14/2021    BACK SURGERY      BREAST SURGERY      CARDIAC SURGERY      CARPAL TUNNEL RELEASE      CHOLECYSTECTOMY, OPEN N/A     COLONOSCOPY      CORONARY ARTERY BYPASS GRAFT      x3 DIALYSIS FISTULA CREATION Left 12/14/2021    LEFT AV FISTULA CREATION UPPER EXTREMITY performed by Elgin Interiano MD at [facility]      IR TUNNELED CATHETER PLACEMENT GREATER THAN 5 YEARS  8/18/2021    IR TUNNELED CATHETER PLACEMENT GREATER THAN 5 YEARS 8/18/2021 [facility] SPECIAL PROCEDURES    KNEE ARTHROSCOPY      right    OTHER SURGICAL HISTORY  04/06/2022    cvc exchange    TONSILLECTOMY           Home Medications:    Outpatient Medications Marked as Taking for the 2/11/23 encounter [facility] Encounter)   Medication Sig Dispense Refill    melatonin 3 MG TABS tablet Take 10 mg by mouth nightly as needed (for sleep) Indications: Trouble Sleeping          Allergies:  Adhesive tape, Isosorbide dinitrate, Ranexa [ranolazine], Metformin and related, Ace inhibitors, Iv dye [iodides], Nsaids, and Metformin and related    Social History:    Social History     Socioeconomic History    Marital status: Single     Spouse name: None    Number of children: None    Years of education: None    Highest education level: None   Tobacco Use    Smoking status: Never    Smokeless tobacco: Never   Vaping Use    Vaping Use: Never used   Substance and Sexual Activity    Alcohol use: No    Drug use: No    Sexual activity: Not Currently     Social Determinants of Health     Financial Resource Strain: Low Risk     Difficulty of Paying Living Expenses: Not very hard   Food Insecurity: No Food Insecurity    Worried About Running Out of Food in the Last Year: Never true    Ran Out of Food in the Last Year: Never true   Transportation Needs: No Transportation Needs    Lack of Transportation (Medical): No    Lack of Transportation (Non-Medical): No   Physical Activity: Insufficiently Active    Days of Exercise per Week: 1 day    Minutes of Exercise per Session: 20 min   Stress: Stress Concern Present    Feeling of Stress :  To some extent   Social Connections: Socially Isolated Frequency of Communication with Friends and Family: More than three times a week    Frequency of Social Gatherings with Friends and Family: More than three times a week    Attends [employer] Services: Never    Active Member of Clubs or Organizations: No    Attends Club or Organization Meetings: Never    Marital Status: Never    Housing Stability: Low Risk     Unable to Pay for Housing in the Last Year: No    Number of [place] in the Last Year: 1    Unstable Housing in the Last Year: No        Family History:   Family History   Problem Relation Age of Onset    Diabetes Father     Heart Failure Father        REVIEW OF SYSTEMS:    Constitutional: there has been no unanticipated weight loss. There's been No change in energy level, No change in activity level. Eyes: No visual changes or diplopia. No scleral icterus. ENT: No Headaches, hearing loss or vertigo. No mouth sores or sore throat. Cardiovascular: As HPI  Respiratory: As HPI  Gastrointestinal: No abdominal pain, appetite loss, blood in stools. No change in bowel or bladder habits. Genitourinary: No dysuria, trouble voiding, or hematuria. Musculoskeletal:  No gait disturbance, No weakness or joint complaints. Integumentary: No rash or pruritis. Neurological: No headache, diplopia, change in muscle strength, numbness or tingling. No change in gait, balance, coordination, mood, affect, memory, mentation, behavior. Psychiatric: No anxiety, or depression. Endocrine: No temperature intolerance. No excessive thirst, fluid intake, or urination. No tremor. Hematologic/Lymphatic: No abnormal bruising or bleeding, blood clots or swollen lymph nodes. Allergic/Immunologic: No nasal congestion or hives.     PHYSICAL EXAM:    Physical Examination:    /68   Pulse 65   Temp 98.4 °F (36.9 °C)   Resp 20   Wt 248 lb (112.5 kg)   SpO2 90%   BMI 41.27 kg/m²    Constitutional and General Appearance: alert, cooperative, no distress and appears stated age  [de-identified]: PERRL, no cervical lymphadenopathy. No masses palpable. Normal oral mucosa  Respiratory:  Normal excursion and expansion without use of accessory muscles  Resp Auscultation: Good respiratory effort. No for increased work of breathing. On auscultation: clear  Cardiovascular:  Heart tones are crisp and normal. regular S1 and S2. Murmurs:linette  Jugular venous pulsation Normal  Abdomen:  No masses or tenderness  Bowel sounds present  Extremities:   No Cyanosis or Clubbing   Lower extremity edema: no   Skin: Warm and dry  Neurological:  Alert and oriented.   Moves all extremities well  No abnormalities of mood, affect, memory, mentation, or behavior are noted    DATA:    Diagnostics:      EKG:   Results for orders placed or performed during the hospital encounter of 01/25/23   EKG 12 Lead   Result Value Ref Range    Ventricular Rate 65 BPM    Atrial Rate 65 BPM    P-R Interval 164 ms    QRS Duration 96 ms    Q-T Interval 476 ms    QTc Calculation (Bazett) 495 ms    P Axis 62 degrees    R Axis 45 degrees    T Axis 8 degrees    Narrative    Normal sinus rhythm  Low voltage QRS  Septal infarct , age undetermined  Abnormal ECG  When compared with ECG of 25-JAN-2023 10:23,  Septal infarct is now Present       Echo:  Results for orders placed or performed during the hospital encounter of 01/25/23   ECHO Complete 2D W Doppler W Color   Result Value Ref Range    Left Ventricular Ejection Fraction 53     LVEF MODALITY ECHO     Narrative    Transthoracic Echocardiography Report (TTE)     Patient Name Trent Bains Date of Study               01/27/2023                TRAMAINE      Date of      1960  Gender                      Female   Birth      Age          57 year(s)  Race                              Room Number  0415        Height:                     65 inch, 165.1 cm      Corporate ID [id number]    Weight:                     244 pounds, 110.7 kg   #      Patient Acct [de-identified]   BSA:          2.15 m^2 BMI:       40.6   #                                                               kg/m^2      MR #         [id number]     Sonographer                 Partha De Paz      Accession #  [id number]  Interpreting Physician      Ruddy Das      Fellow                   Referring Nurse                            Practitioner      Interpreting             Referring Physician         Greg Garcia MD   Fellow     Type of Study      TTE procedure:2D Echocardiogram, M-Mode, Doppler, Color Doppler. Procedure Date  Date: 01/27/2023 Start: 01:33 PM    Study Location: [facility]  Technical Quality: Adequate visualization    Indications:LV Function. History / Tech. Comments:  Echo done at patient bedside. Procedure explained to patient. Type 2 DM, CHF, Hx Covid-19    Patient Status: Inpatient    Height: 65 inches Weight: 244.01 pounds BSA: 2.15 m^2 BMI: 40.6 kg/m^2    HR: 66 bpm    Allergies    - Tape. - Ace Inhibitors. - Contrast.      - Metformin.      - NSAIDS. CONCLUSIONS    Summary  Global left ventricular systolic function is normal.  Estimated LVEF 50-55%. Mild concentric left ventricular hypertrophy. Normal right ventricular size and function. No significant valvular regurgitation or stenosis seen. No pericardial effusion seen. Signature  ----------------------------------------------------------------------------   Electronically signed by Libby Ornelas(Sonographer) on 01/27/2023   03:00 PM  ----------------------------------------------------------------------------    ----------------------------------------------------------------------------   Electronically signed by Ruddy Das(Interpreting physician) on   01/27/2023 04:42 PM  ----------------------------------------------------------------------------  FINDINGS  Left Atrium  Left atrium is normal in size. Left Ventricle  Left ventricle is normal in size.  Global left ventricular systolic function  is normal. Estimated LVEF 50-55%. Mild concentric left ventricular hypertrophy. Indeterminate diastolic function. Right Atrium  Right atrium is normal in size. Right Ventricle  Normal right ventricular size and function. TAPSE value of 1.27cm noted. Mitral Valve  Normal mitral valve structure and function. No mitral regurgitation. Aortic Valve  Aortic valve is trileaflet and opens adequately. No aortic insufficiency. Tricuspid Valve  Normal tricuspid valve structure. Trivial tricuspid regurgitation. Pulmonic Valve  The pulmonic valve is normal in structure. Trivial pulmonic insufficiency. Pericardial Effusion  No pericardial effusion seen. Miscellaneous  E/E' average = 16.6. IVC normal diameter & inspiratory collapse indicating normal RA filling  pressure .     M-mode / 2D Measurements & Calculations:      LVIDd:4.1 cm(3.7 - 5.6 cm)       Diastolic RGBQOA:96.8 ml   LVIDs:3.6 cm(2.2 - 4.0 cm)       Systolic UJNUYZ:14.7 ml   IVSd:1.2 cm(0.6 - 1.1 cm)        Aortic Root:2.6 cm(2.0 - 3.7 cm)   LVPWd:1.2 cm(0.6 - 1.1 cm)       LA Dimension: 4.1 cm(1.9 - 4.0 cm)   Fractional Shortenin.2 %     LA volume/Index: 35.8 ml /17m^2   Calculated LVEF (%): 51.98 %     LVOT:1.9 cm                                    RVDd:3.4 cm      Mitral:                                 Aortic      Valve Area (P1/2-Time): 4.31 cm^2       Peak Velocity: 1.16 m/s   Peak E-Wave: 1.07 m/s                   Mean Velocity: 0.77 m/s   Peak A-Wave: 0.61 m/s                   Peak Gradient: 5.38 mmHg   E/A Ratio: 1.77                         Mean Gradient: 3 mmHg   Peak Gradient: 4.58 mmHg   Mean Gradient: 2 mmHg   Deceleration Time: 174 msec             Area (continuity): 2.38 cm^2   P1/2t: 51 msec                          AV VTI: 23.7 cm      Area (continuity): 1.79 cm^2   Mean Velocity: 0.59 m/s                                              Pulmonic:                                              Peak Velocity: 0.94 m/s Peak Gradient: 3.51 mmHg     Diastology / Tissue Doppler  Septal Wall E' velocity:0.07 m/s  Septal Wall E/E':15.6  Lateral Wall E' velocity:0.06 m/s  Lateral Wall E/E':17.6     Labs:     CBC:   Recent Labs     02/11/23 2130 02/12/23  0607   WBC 8.7 7.6   HGB 11.9* 10.6*   HCT 35.8* 31.9*    197     BMP:   Recent Labs     02/11/23 2130 02/12/23  0607    145*   K 3.5* 3.4*   CO2 34* 33*   BUN 69* 65*   CREATININE 3.04* 2.96*   LABGLOM 17* 17*   GLUCOSE 127* 93     BNP: No results for input(s): BNP in the last 72 hours. PT/INR: No results for input(s): PROTIME, INR in the last 72 hours. APTT:No results for input(s): APTT in the last 72 hours. CARDIAC ENZYMES:  Recent Labs     02/11/23 2130 02/11/23 2230 02/12/23  0607   TROPHS 87* 78* 89*     FASTING LIPID PANEL:  Lab Results   Component Value Date/Time    HDL 43 04/09/2015 12:00 AM    LDLCALC 28 04/09/2015 12:00 AM    TRIG 168 04/09/2015 12:00 AM     LIVER PROFILE:No results for input(s): AST, ALT, LABALBU in the last 72 hours. IMPRESSION:      - Syncope- ? Due to hypotension  - CAD- CABG  - HFpEF  - Bradycardia  - Advanced CKD was on HD prior  - H/o DVT  - Recent echo reviewed  - H/o DM2    RECOMMENDATIONS:  - please consult nephro  - decrease coreg  - decrease norvasc  - will check Lexiscan stress test to rule out ischemia/further risk stratify  in AM  - keep npo at [place]    Discussed with patient and nursing. Thank you for allowing me to participate in the care of this patient, please do not hesitate to call if you have any questions. Nicole Douglas DO, [facility], [address], [address], [place] [address] [facility]  [email or web address]  [phone]

## 2023-02-12 NOTE — CONSULTS
Neurology Consult Note - Neurology Inpatient Service    Patient Name: Jorge Renae  Patient : 1960  MRN: [id number]     Acct: [de-identified]  Date of Admission: 2023  Room/Bed:   PCP: AFSANEH Awan - CNP    2023    Reason for Consult:      History of Presenting Illness: The patient is 57 y.o. -- who is being seen as a new consult for reported episode of loss of consciousness. Symptom: Episode of loss of consciousness. Manner of onset: Sudden. Description of event(s): The patient was lying on her bed. She stood up to go to the toilet. At that time, she felt lightheaded and suddenly lost consciousness. The total duration of symptom was about a minute or so after which, she spontaneously regained consciousness without recurrence of symptoms afterwards. Duration: About a minute or so. Current state: The patient is asymptomatic at this time. Severity: Symptoms are severe at the time of occurrence and, were incapacitating. Currently, the patient is asymptomatic. Modifying factors: Changes in position and posture make patient lightheaded and, have previously also, caused episodes of loss of consciousness. Neurology service was consulted. Thank you. Review of systems:    General: No reported chills, no fever, reports obesity. HEENT: No reported headache, no head injury. No reported eye pain or eye congestion. No reported ear pain or ear congestion. No reported nasal congestion or nosebleed. No reported throat congestion or infection. Neck: No reported neck pain. No reported neck stiffness. Respiratory: No reported wheezing and no reported shortness of breath. Reports snoring and apneic spells. Cardiac: No reported chest pain and no reported palpitations. Reports dizziness, described as lightheadedness. Episode of loss of consciousness.     Gastrointestinal: No reported nausea, vomiting, abdominal pain and, no reported diarrhea. Musculoskeletal: Reports generalized body ache and low back pain. Endocrine: Reports hypothyroidism. Reports type 2 diabetes mellitus. Psychiatry: No reported anxiety and no reported depression. Neurological: Episode of loss of consciousness-resolved. No reported weakness in extremities. No reported speech deficit. No reported seizures. No reported tongue bite or loss of bowel/bladder control. No reported stroke. No reported visual changes. No reported vertigo. No reported hearing loss. Profound gait and ambulatory difficulties. Past medical History, surgical history, family history and social history were reviewed with the patient/care provider and were reviewed in the chart. Only the available information is documented below. Thank you.     Past Medical History:        Diagnosis Date    Arthritis     Backache, unspecified     Cerebral artery occlusion with cerebral infarction [facility])     CHF (congestive heart failure) (HCC)     Chronic kidney disease     Coronary atherosclerosis of artery bypass graft     COVID 1/31/2022    Cramp of limb     Gallstones     Hemodialysis patient ([facility] [address]     Hyperlipidemia     Hypertension     Insomnia     Neuromuscular disorder ([facility] [address]     Pneumonia     Psychiatric problem     Thyroid disease     Type II or unspecified type diabetes mellitus with renal manifestations, not stated as uncontrolled(250.40)     Type II or unspecified type diabetes mellitus without mention of complication, not stated as uncontrolled     Unspecified vitamin D deficiency        Past Surgical History:        Procedure Laterality Date    ANKLE FRACTURE SURGERY      AV FISTULA CREATION  12/14/2021    BACK SURGERY      BREAST SURGERY      CARDIAC SURGERY      CARPAL TUNNEL RELEASE      CHOLECYSTECTOMY, OPEN N/A     COLONOSCOPY      CORONARY ARTERY BYPASS GRAFT      x3    DIALYSIS FISTULA CREATION Left 12/14/2021    LEFT AV FISTULA CREATION UPPER EXTREMITY performed by Nancy Yanes Leola Licea MD at [facility]      IR TUNNELED CATHETER PLACEMENT GREATER THAN 5 YEARS  8/18/2021    IR TUNNELED CATHETER PLACEMENT GREATER THAN 5 YEARS 8/18/2021 [facility] SPECIAL PROCEDURES    KNEE ARTHROSCOPY      right    OTHER SURGICAL HISTORY  04/06/2022    cvc exchange    TONSILLECTOMY         Family History:           Problem Relation Age of Onset    Diabetes Father     Heart Failure Father         Social History:      TOBACCO:   reports that she has never smoked. She has never used smokeless tobacco.  ETOH:   reports no history of alcohol use. RECREATIONAL DRUG USE:   Social History     Substance and Sexual Activity   Drug Use No       The patient's medications and allergies were reviewed and the available information is documented below. Thank you. Allergies: Adhesive tape, Isosorbide dinitrate, Ranexa [ranolazine], Metformin and related, Ace inhibitors, Iv dye [iodides], Nsaids, and Metformin and related    Home Medications:   Prior to Admission medications    Medication Sig Start Date End Date Taking?  Authorizing Provider   melatonin 3 MG TABS tablet Take 10 mg by mouth nightly as needed (for sleep) Indications: Trouble Sleeping   Yes Historical Provider, MD   atorvastatin (LIPITOR) 40 MG tablet Take 1 tablet by mouth nightly 2/6/23   Jonna Traylor MD   amLODIPine (NORVASC) 10 MG tablet Take 1 tablet by mouth daily 2/7/23   Jonna Traylor MD   bumetanide (BUMEX) 1 MG tablet Take 3 tablets by mouth 2 times daily F/u with nephro for dose adjustment 2/6/23 3/8/23  Jonna Traylor MD   carvedilol (COREG) 12.5 MG tablet Take 1 tablet by mouth 2 times daily 2/6/23   Jonna Traylor MD   Continuous Blood Gluc Sensor (DEXCOM G6 SENSOR) MISC 1 each by Does not apply route 4 times daily 1/19/23   AFSANEH Perez CNP   Continuous Blood Gluc  ([address]) KODY 1 each by Does not apply route 4 times daily 1/19/23   AFSANEH Perez CNP linaclotide (LINZESS) 145 MCG capsule Take 1 capsule by mouth every morning (before breakfast) 1/19/23 2/18/23  AFSANEH Daly CNP   sodium bicarbonate 650 MG tablet Take 2 tablets by mouth 2 times daily  Patient taking differently: Take 1,300 mg by mouth 2 times daily Two tab BID 1/16/23 1/16/24  Charlotte Guardado MD   insulin glargine [facility]) 100 UNIT/ML injection pen Inject 55 Units into the skin 2 times daily 12/13/22 1/18/23  AFSANEH Daly CNP   allopurinol (ZYLOPRIM) 100 MG tablet Take 1 tablet by mouth daily 12/8/22   Charlotte Guardado MD   pantoprazole (PROTONIX) 40 MG tablet TAKE 1 TABLET BY MOUTH ONCE DAILY IN THE MORNING BEFORE BREAKFAST 10/20/22   AFSANEH Daly CNP   tamsulosin (FLOMAX) 0.4 MG capsule Take 1 capsule by mouth daily 9/27/22   AFSANEH Daly CNP   Insulin Pen Needle (EASY TOUCH PEN NEEDLES) 29G X 12MM MISC 5 each by Does not apply route daily 9/22/22 1/18/23  AFSANEH Daly CNP   Blood Glucose Monitoring Suppl (TRUE METRIX GO GLUCOSE METER) w/Device KIT 1 each by Does not apply route 4 times daily 9/19/22   AFSANEH Daly CNP   blood glucose monitor strips Test 3 times a day & as needed for symptoms of irregular blood glucose. Dispense sufficient amount for indicated testing frequency plus additional to accommodate PRN testing needs. 9/19/22   AFSANEH Daly CNP   AZO-CRANBERRY PO Take 2 capsules by mouth daily    Historical Provider, MD   Calcium Polycarbophil (FIBER-CAPS PO) Take 2 capsules by mouth in the morning and at bedtime    Historical Provider, MD   Lactobacillus (PROBIOTIC ACIDOPHILUS PO) Take by mouth daily  Patient not taking: Reported on 2/12/2023    Historical Provider, MD   gabapentin (NEURONTIN) 300 MG capsule Take 300 mg by mouth in the morning.     Historical Provider, MD   acetaminophen (TYLENOL) 325 MG tablet Take 650 mg by mouth every 6 hours as needed for Pain    Historical Provider, MD   ReliOn Lancets Micro-Thin 33G MISC USE AS DIRECTED EVERY DAY 1/28/22   Historical Provider, MD   HUMALOG 100 UNIT/ML injection vial Inject into the skin 3 times daily (before meals) Indications: 15 units and sliding scale (patient reports only the sliding scale)  11/29/21   Historical Provider, MD   Lancets MISC 1 each by Does not apply route daily 11/30/21   AFSANEH Hart CNP   docusate sodium (COLACE) 100 MG capsule Take 1 capsule by mouth 2 times daily 11/22/21   Ramin Freeman MD   aspirin 81 MG chewable tablet Take 1 tablet by mouth daily 9/25/21   Kendra Boland MD   allopurinol (ZYLOPRIM) 100 MG tablet Take 1 tablet by mouth daily 4/14/21   AFSANEH Hart CNP       Current Hospital Medications:    Current Facility-Administered Medications:     amLODIPine (NORVASC) tablet 10 mg, 10 mg, Oral, Daily, Diane Busby MD    atorvastatin (LIPITOR) tablet 40 mg, 40 mg, Oral, Nightly, Diane Busby MD    aspirin chewable tablet 81 mg, 81 mg, Oral, Daily, Diane Busby MD    carvedilol (COREG) tablet 12.5 mg, 12.5 mg, Oral, BID, Diane Busby MD    docusate sodium (COLACE) capsule 100 mg, 100 mg, Oral, BID, Diane Busby MD    linaclotide (LINZESS) capsule 145 mcg, 145 mcg, Oral, QAM MARYAM, Diane Busby MD    pantoprazole (PROTONIX) tablet 40 mg, 40 mg, Oral, QAM AC, Diane Busby MD, 40 mg at 02/12/23 0534    tamsulosin (FLOMAX) capsule 0.4 mg, 0.4 mg, Oral, Daily, Diane Busby MD    allopurinol (ZYLOPRIM) tablet 100 mg, 100 mg, Oral, Daily, Diane Busby MD    sodium chloride flush 0.9 % injection 5-40 mL, 5-40 mL, IntraVENous, 2 times per day, Diane Busby MD    sodium chloride flush 0.9 % injection 5-40 mL, 5-40 mL, IntraVENous, PRN, Diane Busby MD    0.9 % sodium chloride infusion, , IntraVENous, PRN, Diane Busby MD    polyethylene glycol (GLYCOLAX) packet 17 g, 17 g, Oral, Daily PRN, Diane Busby MD    acetaminophen (TYLENOL) tablet 650 mg, 650 mg, Oral, Q6H PRN **OR** acetaminophen (TYLENOL) suppository 650 mg, 650 mg, Rectal, Q6H PRN, Evie Roman MD    ondansetron (ZOFRAN-ODT) disintegrating tablet 4 mg, 4 mg, Oral, Q8H PRN **OR** ondansetron (ZOFRAN) injection 4 mg, 4 mg, IntraVENous, Q6H PRN, Evie Roman MD    heparin (porcine) injection 5,000 Units, 5,000 Units, SubCUTAneous, 3 times per day, Evie Roman MD, 5,000 Units at 02/12/23 0534    insulin lispro (HUMALOG) injection vial 0-8 Units, 0-8 Units, SubCUTAneous, TID WC, Evie Roman MD    insulin lispro (HUMALOG) injection vial 0-4 Units, 0-4 Units, SubCUTAneous, Nightly, Evie Roman MD    glucose chewable tablet 16 g, 4 tablet, Oral, PRN, Evie Roman MD    dextrose bolus 10% 125 mL, 125 mL, IntraVENous, PRN **OR** dextrose bolus 10% 250 mL, 250 mL, IntraVENous, PRN, Evie Roman MD    glucagon (rDNA) injection 1 mg, 1 mg, SubCUTAneous, PRN, Evie Roman MD    dextrose 10 % infusion, , IntraVENous, Continuous PRN, Evie Roman MD    gabapentin (NEURONTIN) capsule 300 mg, 300 mg, Oral, Daily, Evie Roman MD    sodium bicarbonate tablet 1,300 mg, 1,300 mg, Oral, BID, Evie Roman MD     Continuous Infusions:   sodium chloride      dextrose         Vital Signs:    Patient Vitals for the past 24 hrs:   BP Temp Temp src Pulse Resp SpO2 Weight   02/12/23 0646 117/68 98.4 °F (36.9 °C) -- 65 20 90 % --   02/12/23 0006 135/65 98.2 °F (36.8 °C) -- 57 -- 94 % --   02/11/23 2305 (!) 128/59 -- -- 61 18 -- --   02/11/23 2304 -- -- -- -- -- 97 % --   02/11/23 2110 138/63 -- -- 62 16 94 % --   02/11/23 2003 (!) 107/59 98.1 °F (36.7 °C) Oral 60 18 96 % 248 lb (112.5 kg)        Physical Examination:    General Exam:    Constitutional: The patient is  morbidly obese. Cardiovascular: S1 and S2, regular rate and rhythm no overt murmurs. No carotid bruit and normal carotid pulse. Extremities: No cyanosis, no clubbing, and 1+ bilateral pedal edema.   Deformed toes bilaterally, particularly in the right foot. Ophthalmology/funduscopic exam: Unremarkable/normal.    Neurological Exam:    Dexterity: The patient is RIGHT handed. Mental Status: Alert, Awake, Oriented x 4, Normal Speech and intact comprehension - 3/3 repetition and recall. Follows 1-3 step commands. Fund of knowledge: Normal.    Attention/concentration: Normal.    Cranial Nerves: CN-II, CN-III, CN-IV, CN-V, CN-VI, CN-VII, CN-VIII, CN-IX, CN-X, CN-XI and, CN-XII are within normal limits bilaterally except for modest bilateral hearing loss. Motor:   Bulk = Symmetric and within normal limits bilaterally. Tone = Symmetric and within normal limits bilaterally. Strength = 5+/5 in all 4 extremities. DTRs = Absent throughout. Plantars = Down-going bilaterally. Abnormal movements = None. Opposition = Normal in both upper extremities. Pronator Drift = No pronator drift on the RIGHT and, no pronator drift on the LEFT side. Sensory:   Light touch, pinprick and vibration exams are within normal limits except for absent vibration in both feet up to the ankles. Coordination:   Finger to nose is normal on the right side and, on the left side. Heel to shin is normal on the right side and, on the left side. Rapid alternation movements are normal on the right side and, on the left side. Gait:  Deferred due to patient's inability to participate at this time. Romberg:  Deferred due to patient's inability to participate at this time. NIHSS score:  N/A.     Results:    Labs:    Last 24hrs  Recent Results (from the past 24 hour(s))   Basic Metabolic Panel    Collection Time: 02/11/23  9:30 PM   Result Value Ref Range    Glucose 127 (H) 70 - 99 mg/dL    BUN 69 (H) 8 - 23 mg/dL    Creatinine 3.04 (H) 0.50 - 0.90 mg/dL    Est, Glom Filt Rate 17 (L) >60 mL/min/1.73m2    Calcium 9.0 8.6 - 10.4 mg/dL    Sodium 144 135 - 144 mmol/L    Potassium 3.5 (L) 3.7 - 5.3 mmol/L    Chloride 100 98 - 107 mmol/L    CO2 34 (H) 20 - 31 mmol/L    Anion Gap 10 9 - 17 mmol/L   CBC with Auto Differential    Collection Time: 02/11/23  9:30 PM   Result Value Ref Range    WBC 8.7 3.5 - 11.0 k/uL    RBC 3.80 (L) 4.0 - 5.2 m/uL    Hemoglobin 11.9 (L) 12.0 - 16.0 g/dL    Hematocrit 35.8 (L) 36 - 46 %    MCV 94.3 80 - 100 fL    MCH 31.3 26 - 34 pg    MCHC 33.2 31 - 37 g/dL    RDW 15.2 (H) 11.5 - 14.9 %    Platelets 432 018 - 951 k/uL    MPV 8.9 6.0 - 12.0 fL    Seg Neutrophils 80 (H) 36 - 66 %    Lymphocytes 11 (L) 24 - 44 %    Monocytes 6 1 - 7 %    Eosinophils % 3 0 - 4 %    Basophils 0 0 - 2 %    Segs Absolute 6.90 1.3 - 9.1 k/uL    Absolute Lymph # 1.00 1.0 - 4.8 k/uL    Absolute Mono # 0.50 0.1 - 1.3 k/uL    Absolute Eos # 0.30 0.0 - 0.4 k/uL    Basophils Absolute 0.00 0.0 - 0.2 k/uL   Brain Natriuretic Peptide    Collection Time: 02/11/23  9:30 PM   Result Value Ref Range    Pro-BNP 2,406 (H) <300 pg/mL   Troponin    Collection Time: 02/11/23  9:30 PM   Result Value Ref Range    Troponin, High Sensitivity 87 (HH) 0 - 14 ng/L   Troponin    Collection Time: 02/11/23 10:30 PM   Result Value Ref Range    Troponin, High Sensitivity 78 (HH) 0 - 14 ng/L   POC Glucose Fingerstick    Collection Time: 02/12/23  1:28 AM   Result Value Ref Range    POC Glucose 111 (H) 65 - 105 mg/dL   Basic Metabolic Panel w/ Reflex to MG    Collection Time: 02/12/23  6:07 AM   Result Value Ref Range    Glucose 93 70 - 99 mg/dL    BUN 65 (H) 8 - 23 mg/dL    Creatinine 2.96 (H) 0.50 - 0.90 mg/dL    Est, Glom Filt Rate 17 (L) >60 mL/min/1.73m2    Calcium 8.8 8.6 - 10.4 mg/dL    Sodium 145 (H) 135 - 144 mmol/L    Potassium 3.4 (L) 3.7 - 5.3 mmol/L    Chloride 101 98 - 107 mmol/L    CO2 33 (H) 20 - 31 mmol/L    Anion Gap 11 9 - 17 mmol/L   CBC with Auto Differential    Collection Time: 02/12/23  6:07 AM   Result Value Ref Range    WBC 7.6 3.5 - 11.0 k/uL    RBC 3.38 (L) 4.0 - 5.2 m/uL    Hemoglobin 10.6 (L) 12.0 - 16.0 g/dL    Hematocrit 31.9 (L) 36 - 46 %    MCV 94.4 80 - 100 fL MCH 31.4 26 - 34 pg    MCHC 33.3 31 - 37 g/dL    RDW 15.0 (H) 11.5 - 14.9 %    Platelets 813 682 - 622 k/uL    MPV 9.0 6.0 - 12.0 fL    Seg Neutrophils 72 (H) 36 - 66 %    Lymphocytes 16 (L) 24 - 44 %    Monocytes 7 1 - 7 %    Eosinophils % 4 0 - 4 %    Basophils 1 0 - 2 %    Segs Absolute 5.40 1.3 - 9.1 k/uL    Absolute Lymph # 1.20 1.0 - 4.8 k/uL    Absolute Mono # 0.60 0.1 - 1.3 k/uL    Absolute Eos # 0.30 0.0 - 0.4 k/uL    Basophils Absolute 0.00 0.0 - 0.2 k/uL   Troponin    Collection Time: 02/12/23  6:07 AM   Result Value Ref Range    Troponin, High Sensitivity 89 (HH) 0 - 14 ng/L   Magnesium    Collection Time: 02/12/23  6:07 AM   Result Value Ref Range    Magnesium 2.2 1.6 - 2.6 mg/dL   POC Glucose Fingerstick    Collection Time: 02/12/23  6:47 AM   Result Value Ref Range    POC Glucose 104 65 - 105 mg/dL     TSH:     Lab Results   Component Value Date    TSH 1.89 08/03/2022     Radiology Personal review:    CT head without contrast is unremarkable. Neurophysiology Results:    EEG: N/A      EMG/NCS: N/A.    ASSESSMENT / PLAN / RECOMMENDATIONS :    Assessment:    Vasovagal versus cardiogenic syncope. Rule out cardiac arrhythmia. Obesity. Presumed obstructive sleep apnea. Ataxia including sensory ataxia. Recommendations:    30-day event monitor placement and, if the study is negative then, please consider loop recorder evaluation. Orthostatic hypotension evaluation. Nephrology consult for hemodialysis management. Cardiology consult for further evaluation. Outpatient sleep medicine consult for obstructive sleep apnea evaluation. Consider bilateral carotid Doppler exam for further evaluation. Outpatient neurology follow-up, 3 to 4 weeks postdischarge. Disposition/Discharge issues:    Neurology services signing off. Please recall us if/as needed. Further recommendations, per primary team.    Thank you.     Electronically signed by Keisha Rose MD on 2/12/2023 at 7:26 AM

## 2023-02-12 NOTE — CARE COORDINATION
Case Management Assessment  Initial Evaluation    Date/Time of Evaluation: 2/12/2023 3:49 PM  Assessment Completed by: Jesse Orourke RN    If patient is discharged prior to next notation, then this note serves as note for discharge by case management. Patient Name: Jorge Renae                   YOB: 1960  Diagnosis: Syncope and collapse [R55]                   Date / Time: 2/11/2023  7:52 PM    Patient Admission Status: Inpatient   Readmission Risk (Low < 19, Mod (19-27), High > 27): Readmission Risk Score: 27.6    Current PCP: AFSANEH Awan CNP  PCP verified by CM? Chart Reviewed: Yes      History Provided by: Patient  Patient Orientation: Alert and Oriented    Patient Cognition: Alert    Hospitalization in the last 30 days (Readmission):  Yes    If yes, Readmission Assessment in  Navigator will be completed. Advance Directives:      Code Status: Full Code   Patient's Primary Decision Maker is:        Discharge Planning:    Patient lives with: Other (Comment) (SNF) Type of Home: [place]  Primary Care Giver: Other (Comment) (SNF [address])  Patient Support Systems include: Family Members   Current Financial resources: Medicaid, Medicare  Current community resources:    Current services prior to admission: [facility]            Current DME:              Type of Home Care services:  None    ADLS  Prior functional level: Assistance with the following:, Bathing, Dressing, Toileting  Current functional level: Assistance with the following:, Bathing, Dressing, Toileting    PT AM-PAC:   /24  OT AM-PAC:   /24    Family can provide assistance at DC: Yes  Would you like Case Management to discuss the discharge plan with any other family members/significant others, and if so, who?  No  Plans to Return to Present Housing: Yes (pt states she plans to return to [address] [place] [address])  Other Identified Issues/Barriers to RETURNING to current housing: no, pt plans to return to [facility]. She states \"they are holding my bed. \"   Potential Assistance needed at discharge: [facility]            Potential DME:    Patient expects to discharge to: [facility] for transportation at discharge: [address]    [address] / Plan: Rayray Robertson / Product Type: *No Product type* /     Does insurance require precert for SNF: Will have to follow up with [facility] tomorrow to see if pre-cert is needed. Potential assistance Purchasing Medications: No  Meds-to-Beds request:        [facility] [address] [place] [address]  Phone: [phone] Fax: [address] [place] [address], [phone] [place] [phone]  [place] [address]  Phone: [phone] Fax: [phone]      Notes:    Factors facilitating achievement of predicted outcomes: Family support    Barriers to discharge: n/a    Additional Case Management Notes: from [facility] and plans to return. The Plan for Transition of Care is related to the following treatment goals of Syncope and collapse [S39]    IF APPLICABLE: The Patient and/or patient representative Billy Chaudhary and her family were provided with a choice of provider and agrees with the discharge plan. Freedom of choice list with basic dialogue that supports the patient's individualized plan of care/goals and shares the quality data associated with the providers was provided to: Patient   Patient Representative Name:       The Patient and/or Patient Representative Agree with the Discharge Plan?  Yes    Dipesh Kurtz RN  Case Management Department  Ph: [phone] Fax: [phone]

## 2023-02-12 NOTE — ED PROVIDER NOTES
[address]  Emergency Department Encounter  Emergency Medicine Resident     Pt Ulysses Hinds  MRN: [id number]  [place] 1960  Date of evaluation: 2/11/23  PCP:  AFSANEH Mathew CNP      CHIEF COMPLAINT       Chief Complaint   Patient presents with    Loss of Consciousness       HISTORY OF PRESENT ILLNESS  (Location/Symptom, Timing/Onset, Context/Setting, Quality, Duration, Modifying Factors, Severity.)      Lisa Huber is a 57 y.o. female who presents with EMS due to syncopal episode at her facility. Patient has syncopal episode at home was on the ground unable to get up when she alerted staff. On arrival patient is reporting left-sided shoulder pain, right-sided leg pain. Patient has chronic back pain denies any changes to that pain. Is unsure of if she struck her head, is on aspirin at home. Denies any nausea or vomiting lightheadedness or dizziness at this time. Patient states that she was attempting to get up to use the bathroom and was using her walker before she fell. Is any numbness, tingling or weakness. PAST MEDICAL / SURGICAL / SOCIAL / FAMILY HISTORY      has a past medical history of Arthritis, Backache, unspecified, Cerebral artery occlusion with cerebral infarction ([address] CHF (congestive heart failure) ([address] Chronic kidney disease, Coronary atherosclerosis of artery bypass graft, COVID, Cramp of limb, Gallstones, Hemodialysis patient ([address] Hyperlipidemia, Hypertension, Insomnia, Neuromuscular disorder ([address] Pneumonia, Psychiatric problem, Thyroid disease, Type II or unspecified type diabetes mellitus with renal manifestations, not stated as uncontrolled(250.40), Type II or unspecified type diabetes mellitus without mention of complication, not stated as uncontrolled, and Unspecified vitamin D deficiency. has a past surgical history that includes Coronary artery bypass graft; Knee arthroscopy; Carpal tunnel release; Breast surgery;  Tonsillectomy; Hand surgery; Ankle fracture surgery; Cholecystectomy, open (N/A); IR TUNNELED CVC PLACE WO SQ PORT/PUMP > 5 YEARS (8/18/2021); AV fistula creation (12/14/2021); Dialysis fistula creation (Left, 12/14/2021); other surgical history (04/06/2022); back surgery; Colonoscopy; eye surgery; fracture surgery; and Cardiac surgery. Social History     Socioeconomic History    Marital status: Single     Spouse name: Not on file    Number of children: Not on file    Years of education: Not on file    Highest education level: Not on file   Occupational History    Not on file   Tobacco Use    Smoking status: Never    Smokeless tobacco: Never   Vaping Use    Vaping Use: Never used   Substance and Sexual Activity    Alcohol use: No    Drug use: No    Sexual activity: Not Currently   Other Topics Concern    Not on file   Social History Narrative    Not on file     Social Determinants of Health     Financial Resource Strain: Low Risk     Difficulty of Paying Living Expenses: Not very hard   Food Insecurity: No Food Insecurity    Worried About Running Out of Food in the Last Year: Never true    Ran Out of Food in the Last Year: Never true   Transportation Needs: No Transportation Needs    Lack of Transportation (Medical): No    Lack of Transportation (Non-Medical): No   Physical Activity: Insufficiently Active    Days of Exercise per Week: 1 day    Minutes of Exercise per Session: 20 min   Stress: Stress Concern Present    Feeling of Stress :  To some extent   Social Connections: Socially Isolated    Frequency of Communication with Friends and Family: More than three times a week    Frequency of Social Gatherings with Friends and Family: More than three times a week    Attends Zoroastrianism Services: Never    Active Member of Clubs or Organizations: No    Attends Club or Organization Meetings: Never    Marital Status: Never    Intimate Partner Violence: Not on file   Housing Stability: [address] to Pay for Housing in the Last Year: No    Number of Places Lived in the Last Year: 1    Unstable Housing in the Last Year: No       Family History   Problem Relation Age of Onset    Diabetes Father     Heart Failure Father        Allergies:  Adhesive tape, Isosorbide dinitrate, Ranexa [ranolazine], Metformin and related, Ace inhibitors, Iv dye [iodides], Nsaids, and Metformin and related    Home Medications:  Prior to Admission medications    Medication Sig Start Date End Date Taking?  Authorizing Provider   atorvastatin (LIPITOR) 40 MG tablet Take 1 tablet by mouth nightly 2/6/23   Jennifer Guzmán MD   amLODIPine (NORVASC) 10 MG tablet Take 1 tablet by mouth daily 2/7/23   Jennifer Guzmán MD   bumetanide (BUMEX) 1 MG tablet Take 3 tablets by mouth 2 times daily F/u with nephro for dose adjustment 2/6/23 3/8/23  Jennifer Guzmán MD   carvedilol (COREG) 12.5 MG tablet Take 1 tablet by mouth 2 times daily 2/6/23   Jennifer Guzmán MD   Continuous Blood Gluc Sensor (DEXCOM G6 SENSOR) MISC 1 each by Does not apply route 4 times daily 1/19/23   AFSANEH Mayberry CNP   Continuous Blood Gluc  ([address]) [address] 1 each by Does not apply route 4 times daily 1/19/23   AFSANEH Mayberry CNP   linaclotide Oral Draft) 145 MCG capsule Take 1 capsule by mouth every morning (before breakfast) 1/19/23 2/18/23  AFSANEH Mayberry CNP   sodium bicarbonate 650 MG tablet Take 2 tablets by mouth 2 times daily  Patient taking differently: Take 1,300 mg by mouth 2 times daily Two tab BID 1/16/23 1/16/24  Merlyn Smith MD   insulin glargine [facility]) 100 UNIT/ML injection pen Inject 55 Units into the skin 2 times daily 12/13/22 1/18/23  AFSANEH Mayberry CNP   allopurinol (ZYLOPRIM) 100 MG tablet Take 1 tablet by mouth daily 12/8/22   Merlyn Smith MD   pantoprazole (PROTONIX) 40 MG tablet TAKE 1 TABLET BY MOUTH ONCE DAILY IN THE MORNING BEFORE BREAKFAST 10/20/22   AFSANEH Mayberry - CNP   tamsulosin (FLOMAX) 0.4 MG capsule Take 1 capsule by mouth daily 9/27/22   AFSANEH Urrutia CNP   Insulin Pen Needle (EASY TOUCH PEN NEEDLES) 29G X 12MM MISC 5 each by Does not apply route daily 9/22/22 1/18/23  AFSANEH Urrutia CNP   Blood Glucose Monitoring Suppl (TRUE METRIX GO GLUCOSE METER) w/Device KIT 1 each by Does not apply route 4 times daily 9/19/22   AFSANEH Urrutia CNP   blood glucose monitor strips Test 3 times a day & as needed for symptoms of irregular blood glucose. Dispense sufficient amount for indicated testing frequency plus additional to accommodate PRN testing needs. 9/19/22   AFSANEH Urrutia CNP   AZO-CRANBERRY PO Take 2 capsules by mouth daily    Historical Provider, MD   Calcium Polycarbophil (FIBER-CAPS PO) Take 2 capsules by mouth in the morning and at bedtime    Historical Provider, MD   Lactobacillus (PROBIOTIC ACIDOPHILUS PO) Take by mouth daily    Historical Provider, MD   gabapentin (NEURONTIN) 300 MG capsule Take 300 mg by mouth in the morning.     Historical Provider, MD   acetaminophen (TYLENOL) 325 MG tablet Take 650 mg by mouth every 6 hours as needed for Pain    Historical Provider, MD   ReliOn Lancets Micro-Thin 33G MISC USE AS DIRECTED EVERY DAY 1/28/22   Historical Provider, MD   HUMALOG 100 UNIT/ML injection vial Inject into the skin 3 times daily (before meals) Indications: 15 units and sliding scale (patient reports only the sliding scale)  11/29/21   Historical Provider, MD   Lancets MISC 1 each by Does not apply route daily 11/30/21   AFSANEH Urrutia CNP   docusate sodium (COLACE) 100 MG capsule Take 1 capsule by mouth 2 times daily 11/22/21   Prosper Hernandez MD   aspirin 81 MG chewable tablet Take 1 tablet by mouth daily 9/25/21   Elsy Campbell MD   allopurinol (ZYLOPRIM) 100 MG tablet Take 1 tablet by mouth daily 4/14/21   AFSANEH Urrutia CNP       REVIEW OF SYSTEMS    (2-9 systems for level 4, 10 or more for level 5)      Review of Systems   Constitutional:  Negative for chills and fever. HENT:  Negative for ear pain, postnasal drip, sore throat and trouble swallowing. Eyes:  Negative for visual disturbance. Respiratory:  Negative for cough, chest tightness and shortness of breath. Cardiovascular:  Negative for chest pain. Gastrointestinal:  Negative for abdominal distention, abdominal pain, constipation, diarrhea and vomiting. Genitourinary:  Negative for difficulty urinating, dysuria, flank pain and vaginal discharge. Musculoskeletal:  Positive for gait problem and joint swelling. Negative for back pain and neck pain. Neurological:  Positive for syncope. Negative for weakness, numbness and headaches. Psychiatric/Behavioral:  Negative for confusion. PHYSICAL EXAM   (up to 7 for level 4, 8 or more for level 5)      INITIAL VITALS:   BP (!) 128/59   Pulse 61   Temp 98.1 °F (36.7 °C) (Oral)   Resp 18   Wt 248 lb (112.5 kg)   SpO2 97%   BMI 41.27 kg/m²     Physical Exam  Constitutional:       Appearance: Normal appearance. HENT:      Head: Normocephalic and atraumatic. Right Ear: External ear normal.      Left Ear: External ear normal.   Eyes:      Extraocular Movements: Extraocular movements intact. Cardiovascular:      Rate and Rhythm: Normal rate. Pulses: Normal pulses. Pulmonary:      Effort: Pulmonary effort is normal.      Breath sounds: Normal breath sounds. Abdominal:      Palpations: Abdomen is soft. Tenderness: There is no abdominal tenderness. Musculoskeletal:         General: Normal range of motion. Cervical back: Normal range of motion. Comments: Tenderness to the right hip, right lower leg. No obvious deformity appreciated. Tenderness to the left shoulder with no obvious deformity appreciated. Neurological:      General: No focal deficit present. Mental Status: She is alert and oriented to person, place, and time.    Psychiatric:         Mood and Affect: Mood normal.       DIFFERENTIAL  DIAGNOSIS/ MDM/ ED COURSE     PLAN (LABS / IMAGING / EKG):  Orders Placed This Encounter   Procedures    XR SHOULDER LEFT (MIN 2 VIEWS)    XR HIP 2-3 VW W PELVIS RIGHT    XR TIBIA FIBULA RIGHT (2 VIEWS)    CT HEAD WO CONTRAST    XR CHEST (SINGLE VIEW FRONTAL)    Basic Metabolic Panel    CBC with Auto Differential    Brain Natriuretic Peptide    Troponin    Troponin    Inpatient consult to Primary Care Provider    EKG 12 Lead    ADMIT TO INPATIENT       MEDICATIONS ORDERED:  No orders of the defined types were placed in this encounter. DDX: ACS, CHF exacerbation, COPD exacerbation, syncope, arrhythmia, ICH, CHI    Medical Decision Making  49-year-old female with a history of CKD stage III, previously on hemodialysis, and admission for CHF exacerbation. Presents with EMS due to syncopal episode and fall. Patient is reporting right-sided leg pain as well as left shoulder pain. No obvious signs of trauma. CT head, x-rays of the painful joints ordered. Cardiac work-up ordered to rule out cardiac etiology of syncope. Patient will likely need admission due to syncopal episode. Amount and/or Complexity of Data Reviewed  Labs: ordered. Radiology: ordered. ECG/medicine tests: ordered. Risk  Decision regarding hospitalization. ED Course as of 02/11/23 2358   Sat Feb 11, 2023 2117 IMPRESSION:  No acute intracranial abnormality. [SS]   2204 No fractures [SS]      ED Course User Index  [SS] Jeyson Lyman MD     Admit patient to [facility] due to syncopal episode and collapse.          DIAGNOSTIC RESULTS    LAB RESULTS:  Results for orders placed or performed during the hospital encounter of 85/10/08   Basic Metabolic Panel   Result Value Ref Range    Glucose 127 (H) 70 - 99 mg/dL    BUN 69 (H) 8 - 23 mg/dL    Creatinine 3.04 (H) 0.50 - 0.90 mg/dL    Est, Glom Filt Rate 17 (L) >60 mL/min/1.73m2    Calcium 9.0 8.6 - 10.4 mg/dL    Sodium 144 135 - 144 mmol/L    Potassium 3.5 (L) 3.7 - 5.3 mmol/L    Chloride 100 98 - 107 mmol/L    CO2 34 (H) 20 - 31 mmol/L    Anion Gap 10 9 - 17 mmol/L   CBC with Auto Differential   Result Value Ref Range    WBC 8.7 3.5 - 11.0 k/uL    RBC 3.80 (L) 4.0 - 5.2 m/uL    Hemoglobin 11.9 (L) 12.0 - 16.0 g/dL    Hematocrit 35.8 (L) 36 - 46 %    MCV 94.3 80 - 100 fL    MCH 31.3 26 - 34 pg    MCHC 33.2 31 - 37 g/dL    RDW 15.2 (H) 11.5 - 14.9 %    Platelets 071 010 - 022 k/uL    MPV 8.9 6.0 - 12.0 fL    Seg Neutrophils 80 (H) 36 - 66 %    Lymphocytes 11 (L) 24 - 44 %    Monocytes 6 1 - 7 %    Eosinophils % 3 0 - 4 %    Basophils 0 0 - 2 %    Segs Absolute 6.90 1.3 - 9.1 k/uL    Absolute Lymph # 1.00 1.0 - 4.8 k/uL    Absolute Mono # 0.50 0.1 - 1.3 k/uL    Absolute Eos # 0.30 0.0 - 0.4 k/uL    Basophils Absolute 0.00 0.0 - 0.2 k/uL   Brain Natriuretic Peptide   Result Value Ref Range    Pro-BNP 2,406 (H) <300 pg/mL   Troponin   Result Value Ref Range    Troponin, High Sensitivity 78 (HH) 0 - 14 ng/L   Troponin   Result Value Ref Range    Troponin, High Sensitivity 87 (HH) 0 - 14 ng/L           RADIOLOGY:  XR SHOULDER LEFT (MIN 2 VIEWS)   Final Result   No fracture         XR HIP 2-3 VW W PELVIS RIGHT   Final Result   No visualized fracture         XR TIBIA FIBULA RIGHT (2 VIEWS)   Final Result   No visualized fracture         XR CHEST (SINGLE VIEW FRONTAL)   Final Result      No definite consolidation         CT HEAD WO CONTRAST   Final Result   No acute intracranial abnormality. CONSULTS:  IP CONSULT TO PRIMARY CARE PROVIDER  IP CONSULT TO SOCIAL WORK  IP CONSULT TO NEUROLOGY  IP CONSULT TO CARDIOLOGY    CRITICAL CARE:  There was significant risk of life threatening deterioration of patient's condition requiring my direct management. Critical care time 0 minutes, excluding any documented procedures. FINAL IMPRESSION      1.  Syncope and collapse          DISPOSITION / PLAN     DISPOSITION Admitted 02/11/2023 11:24:13 PM      PATIENT REFERRED TO:  Anna Service, APRN - CNP  [address]            DISCHARGE MEDICATIONS:  New Prescriptions    No medications on file       Julieth Bianchi MD  Emergency Medicine Resident    (Please note that portions of thisnote were completed with a voice recognition program.  Efforts were made to edit the dictations but occasionally words are mis-transcribed.)       Julieth Bianchi MD  Resident  02/12/23 4013

## 2023-02-12 NOTE — H&P
[address] [facility]      History & Physical              Date:   2/12/2023  Patient name:  Altagracia Palafox  Date of admission:  2/11/2023  7:52 PM  MRN:   [id number]  YOB: 1960    CHIEF COMPLAINT:       Chief Complaint   Patient presents with    Loss of Consciousness       History Obtained From:  Patient and chart review. HPI:     The patient is a 57 y.o.  female who presented with syncope and collapse at her nursing facility. She was unsure if she hit her head, she denies any dizziness or lightheadedness prior to the episode. She states she was trying to stand up from a seated position when she fell. She has an extensive medical history of T2DM, CKD, HTN. She has a known history of disequilibrium and was recently seen by neurology, however, she denies feeling dizzy prior to the syncopal episode. At ER trop, creatinine were near her baseline. Imaging did not show any acute fractures, CT head negative. Vitals showed a lower end of normal for her BP, pulse. and she is admitted to the hospital for syncope and collapse. The patient was seen and examined at bedside. No acute events overnight. Pt has a cough for the past week. Occasionally productive. Dyspnea for several weeks, since she was at [facility]. Vs when she moves around. All specialist notes, labs & imaging reviewed. The case was discussed with nursing staff.         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 PAST SURGICAL HISTORY:      has a past surgical history that includes Coronary artery bypass graft; Knee arthroscopy; Carpal tunnel release; Breast surgery; Tonsillectomy; Hand surgery; Ankle fracture surgery; Cholecystectomy, open (N/A); IR TUNNELED CVC PLACE WO SQ PORT/PUMP > 5 YEARS (8/18/2021); AV fistula creation (12/14/2021); Dialysis fistula creation (Left, 12/14/2021); other surgical history (04/06/2022); back surgery; Colonoscopy; eye surgery; fracture surgery; and Cardiac surgery. FAMILY HISTORY:     family history includes Diabetes in her father; Heart Failure in her father. HOME MEDICATIONS:     Prior to Admission medications    Medication Sig Start Date End Date Taking?  Authorizing Provider   melatonin 3 MG TABS tablet Take 10 mg by mouth nightly as needed (for sleep) Indications: Trouble Sleeping   Yes Historical Provider, MD   atorvastatin (LIPITOR) 40 MG tablet Take 1 tablet by mouth nightly 2/6/23   Jj Gould MD   amLODIPine (NORVASC) 10 MG tablet Take 1 tablet by mouth daily 2/7/23   Jj Gould MD   bumetanide (BUMEX) 1 MG tablet Take 3 tablets by mouth 2 times daily F/u with nephro for dose adjustment 2/6/23 3/8/23  Jj Gould MD   carvedilol (COREG) 12.5 MG tablet Take 1 tablet by mouth 2 times daily 2/6/23   Jj Gould MD   Continuous Blood Gluc Sensor (DEXCOM G6 SENSOR) MISC 1 each by Does not apply route 4 times daily 1/19/23   AFSANEH Post CNP   Continuous Blood Gluc  ([address]) KODY 1 each by Does not apply route 4 times daily 1/19/23   AFSANEH Post - CNP   linaclotide [place] Romanole) 145 MCG capsule Take 1 capsule by mouth every morning (before breakfast) 1/19/23 2/18/23  AFSANEH Post - CNP   sodium bicarbonate 650 MG tablet Take 2 tablets by mouth 2 times daily  Patient taking differently: Take 1,300 mg by mouth 2 times daily Two tab BID 1/16/23 1/16/24  Piedad Pastor MD   insulin glargine [facility]) 100 UNIT/ML injection pen Inject 55 Units into the skin 2 times daily 12/13/22 1/18/23  AFSANEH David CNP   allopurinol (ZYLOPRIM) 100 MG tablet Take 1 tablet by mouth daily 12/8/22   Piedad Pastor MD   pantoprazole (PROTONIX) 40 MG tablet TAKE 1 TABLET BY MOUTH ONCE DAILY IN THE MORNING BEFORE BREAKFAST 10/20/22   AFSANEH David CNP   tamsulosin (FLOMAX) 0.4 MG capsule Take 1 capsule by mouth daily 9/27/22   AFSANEH David CNP   Insulin Pen Needle (EASY TOUCH PEN NEEDLES) 29G X 12MM MISC 5 each by Does not apply route daily 9/22/22 1/18/23  AFSANEH David CNP   Blood Glucose Monitoring Suppl (TRUE METRIX GO GLUCOSE METER) w/Device KIT 1 each by Does not apply route 4 times daily 9/19/22   AFSANEH David CNP   blood glucose monitor strips Test 3 times a day & as needed for symptoms of irregular blood glucose. Dispense sufficient amount for indicated testing frequency plus additional to accommodate PRN testing needs. 9/19/22   AFSANEH David CNP   AZO-CRANBERRY PO Take 2 capsules by mouth daily    Historical Provider, MD   Calcium Polycarbophil (FIBER-CAPS PO) Take 2 capsules by mouth in the morning and at bedtime    Historical Provider, MD   Lactobacillus (PROBIOTIC ACIDOPHILUS PO) Take by mouth daily  Patient not taking: Reported on 2/12/2023    Historical Provider, MD   gabapentin (NEURONTIN) 300 MG capsule Take 300 mg by mouth in the morning.     Historical Provider, MD   acetaminophen (TYLENOL) 325 MG tablet Take 650 mg by mouth every 6 hours as needed for Pain    Historical Provider, MD   ReliOn Lancets Micro-Thin 33G MISC USE AS DIRECTED EVERY DAY 1/28/22   Historical Provider, MD   HUMALOG 100 UNIT/ML injection vial Inject into the skin 3 times daily (before meals) Indications: 15 units and sliding scale (patient reports only the sliding scale)  11/29/21   Historical Provider, MD   Lancets MISC 1 each by Does not apply route daily 11/30/21   AFSANEH Pollard CNP   docusate sodium (COLACE) 100 MG capsule Take 1 capsule by mouth 2 times daily 11/22/21   John Perez MD   aspirin 81 MG chewable tablet Take 1 tablet by mouth daily 9/25/21   Emelia Chang MD   allopurinol (ZYLOPRIM) 100 MG tablet Take 1 tablet by mouth daily 4/14/21   AFSANEH Pollard CNP       ALLERGIES:      Adhesive tape, Isosorbide dinitrate, Ranexa [ranolazine], Metformin and related, Ace inhibitors, Iv dye [iodides], Nsaids, and Metformin and related    SOCIAL HISTORY:      reports that she has never smoked. She has never used smokeless tobacco. She reports that she does not drink alcohol and does not use drugs. REVIEW OF SYSTEMS:     Review of Systems   Constitutional:  Positive for chills. Negative for fever. HENT:  Negative for congestion, rhinorrhea and sore throat. Respiratory:  Positive for cough and shortness of breath. Gastrointestinal:  Positive for diarrhea. Negative for abdominal pain, blood in stool, constipation, nausea and vomiting. Genitourinary:  Positive for dysuria. Negative for difficulty urinating. Musculoskeletal:  Positive for arthralgias. Neurological:  Positive for dizziness, syncope, light-headedness and headaches. PHYSICAL EXAM:     Vitals:    02/11/23 2304 02/11/23 2305 02/12/23 0006 02/12/23 0646   BP:  (!) 128/59 135/65 117/68   Pulse:  61 57 65   Resp:  18  20   Temp:   98.2 °F (36.8 °C) 98.4 °F (36.9 °C)   TempSrc:       SpO2: 97%  94% 90%   Weight:           No intake or output data in the 24 hours ending 02/12/23 1143    Physical Exam  Vitals and nursing note reviewed. Constitutional:       General: She is not in acute distress. Appearance: Normal appearance. Eyes:      Extraocular Movements: Extraocular movements intact.       Conjunctiva/sclera: Conjunctivae normal. Cardiovascular:      Rate and Rhythm: Normal rate and regular rhythm. Pulses: Normal pulses. Heart sounds: Normal heart sounds. Pulmonary:      Effort: Pulmonary effort is normal.      Breath sounds: Rales present. Abdominal:      General: Bowel sounds are normal. There is no distension. Palpations: Abdomen is soft. Tenderness: There is no abdominal tenderness. There is no guarding. Musculoskeletal:      Comments: Tenderness right hand digits 2 & 3, no deformity  Pt can not fully  with right hand due to pain    Trace LE edema, L>R   Neurological:      General: No focal deficit present. Mental Status: She is alert and oriented to person, place, and time. Cranial Nerves: No cranial nerve deficit. Sensory: No sensory deficit.          DIAGNOSTICS:      Laboratory Testing:    Recent Results (from the past 24 hour(s))   Basic Metabolic Panel    Collection Time: 02/11/23  9:30 PM   Result Value Ref Range    Glucose 127 (H) 70 - 99 mg/dL    BUN 69 (H) 8 - 23 mg/dL    Creatinine 3.04 (H) 0.50 - 0.90 mg/dL    Est, Glom Filt Rate 17 (L) >60 mL/min/1.73m2    Calcium 9.0 8.6 - 10.4 mg/dL    Sodium 144 135 - 144 mmol/L    Potassium 3.5 (L) 3.7 - 5.3 mmol/L    Chloride 100 98 - 107 mmol/L    CO2 34 (H) 20 - 31 mmol/L    Anion Gap 10 9 - 17 mmol/L   CBC with Auto Differential    Collection Time: 02/11/23  9:30 PM   Result Value Ref Range    WBC 8.7 3.5 - 11.0 k/uL    RBC 3.80 (L) 4.0 - 5.2 m/uL    Hemoglobin 11.9 (L) 12.0 - 16.0 g/dL    Hematocrit 35.8 (L) 36 - 46 %    MCV 94.3 80 - 100 fL    MCH 31.3 26 - 34 pg    MCHC 33.2 31 - 37 g/dL    RDW 15.2 (H) 11.5 - 14.9 %    Platelets 814 008 - 434 k/uL    MPV 8.9 6.0 - 12.0 fL    Seg Neutrophils 80 (H) 36 - 66 %    Lymphocytes 11 (L) 24 - 44 %    Monocytes 6 1 - 7 %    Eosinophils % 3 0 - 4 %    Basophils 0 0 - 2 %    Segs Absolute 6.90 1.3 - 9.1 k/uL    Absolute Lymph # 1.00 1.0 - 4.8 k/uL    Absolute Mono # 0.50 0.1 - 1.3 k/uL Absolute Eos # 0.30 0.0 - 0.4 k/uL    Basophils Absolute 0.00 0.0 - 0.2 k/uL   Brain Natriuretic Peptide    Collection Time: 02/11/23  9:30 PM   Result Value Ref Range    Pro-BNP 2,406 (H) <300 pg/mL   Troponin    Collection Time: 02/11/23  9:30 PM   Result Value Ref Range    Troponin, High Sensitivity 87 (HH) 0 - 14 ng/L   Troponin    Collection Time: 02/11/23 10:30 PM   Result Value Ref Range    Troponin, High Sensitivity 78 (HH) 0 - 14 ng/L   POC Glucose Fingerstick    Collection Time: 02/12/23  1:28 AM   Result Value Ref Range    POC Glucose 111 (H) 65 - 105 mg/dL   Basic Metabolic Panel w/ Reflex to MG    Collection Time: 02/12/23  6:07 AM   Result Value Ref Range    Glucose 93 70 - 99 mg/dL    BUN 65 (H) 8 - 23 mg/dL    Creatinine 2.96 (H) 0.50 - 0.90 mg/dL    Est, Glom Filt Rate 17 (L) >60 mL/min/1.73m2    Calcium 8.8 8.6 - 10.4 mg/dL    Sodium 145 (H) 135 - 144 mmol/L    Potassium 3.4 (L) 3.7 - 5.3 mmol/L    Chloride 101 98 - 107 mmol/L    CO2 33 (H) 20 - 31 mmol/L    Anion Gap 11 9 - 17 mmol/L   CBC with Auto Differential    Collection Time: 02/12/23  6:07 AM   Result Value Ref Range    WBC 7.6 3.5 - 11.0 k/uL    RBC 3.38 (L) 4.0 - 5.2 m/uL    Hemoglobin 10.6 (L) 12.0 - 16.0 g/dL    Hematocrit 31.9 (L) 36 - 46 %    MCV 94.4 80 - 100 fL    MCH 31.4 26 - 34 pg    MCHC 33.3 31 - 37 g/dL    RDW 15.0 (H) 11.5 - 14.9 %    Platelets 029 457 - 545 k/uL    MPV 9.0 6.0 - 12.0 fL    Seg Neutrophils 72 (H) 36 - 66 %    Lymphocytes 16 (L) 24 - 44 %    Monocytes 7 1 - 7 %    Eosinophils % 4 0 - 4 %    Basophils 1 0 - 2 %    Segs Absolute 5.40 1.3 - 9.1 k/uL    Absolute Lymph # 1.20 1.0 - 4.8 k/uL    Absolute Mono # 0.60 0.1 - 1.3 k/uL    Absolute Eos # 0.30 0.0 - 0.4 k/uL    Basophils Absolute 0.00 0.0 - 0.2 k/uL   Troponin    Collection Time: 02/12/23  6:07 AM   Result Value Ref Range    Troponin, High Sensitivity 89 (HH) 0 - 14 ng/L   Magnesium    Collection Time: 02/12/23  6:07 AM   Result Value Ref Range    Magnesium 2. 2 1.6 - 2.6 mg/dL   POC Glucose Fingerstick    Collection Time: 02/12/23  6:47 AM   Result Value Ref Range    POC Glucose 104 65 - 105 mg/dL   POC Glucose Fingerstick    Collection Time: 02/12/23 11:21 AM   Result Value Ref Range    POC Glucose 322 (H) 65 - 105 mg/dL         Imaging/Diagonstics:  XR CHEST (SINGLE VIEW FRONTAL)    Result Date: 2/11/2023  EXAMINATION: ONE XRAY VIEW OF THE CHEST 2/11/2023 9:20 pm COMPARISON: 01/29/2023 HISTORY: ORDERING SYSTEM PROVIDED HISTORY: Syncopal episode TECHNOLOGIST PROVIDED HISTORY: Syncopal episode Reason for Exam: pt experienced loss of consciousness,fall,pain in left shoulder,right hip and pelvis,and right lower leg. FINDINGS: Heart size is mildly enlarged aorta is normal.  Lungs are normally expanded. Mild right infrahilar opacity but the frontal views apical lordotic. No definite consolidation. No pleural effusions. Mild spondylosis     No definite consolidation     XR CHEST (2 VW)    Result Date: 1/24/2023  EXAMINATION: TWO XRAY VIEWS OF THE CHEST 1/24/2023 4:25 pm COMPARISON: 06/18/2022 HISTORY: ORDERING SYSTEM PROVIDED HISTORY: Hypoxemia TECHNOLOGIST PROVIDED HISTORY: worsening of shortness of breath and hypoxemia Reason for Exam: pt state SOB x 1 yr , chest pressure FINDINGS: Status post median sternotomy. Cardiomegaly. Pulmonary vascular congestion. Pulmonary edema. No focal airspace disease. No pneumothorax. Findings suggest congestive heart failure The findings were sent to the Radiology Results [address] at 5:31 pm on 1/24/2023 to be communicated to a licensed caregiver. XR TIBIA FIBULA RIGHT (2 VIEWS)    Result Date: 2/11/2023  EXAMINATION: 2 XRAY VIEWS OF THE RIGHT TIBIA AND FIBULA 2/11/2023 9:20 pm COMPARISON: None.  HISTORY: ORDERING SYSTEM PROVIDED HISTORY: Fall TECHNOLOGIST PROVIDED HISTORY: Fall Reason for Exam: pt experienced loss of consciousness,fall,pain in left shoulder,right hip and pelvis,and right lower leg FINDINGS: Negative for fracture or dislocation. Marginal spurring medially. Dense meniscal calcifications laterally. No definite joint effusion. Osteophytes posterior patella. Phleboliths in the soft tissues. Vascular calcifications. No visualized fracture     CT HEAD WO CONTRAST    Result Date: 2/11/2023  EXAMINATION: CT OF THE HEAD WITHOUT CONTRAST  2/11/2023 8:42 pm TECHNIQUE: CT of the head was performed without the administration of intravenous contrast. Automated exposure control, iterative reconstruction, and/or weight based adjustment of the mA/kV was utilized to reduce the radiation dose to as low as reasonably achievable. COMPARISON: June 25, 2022 brain MRI. HISTORY: ORDERING SYSTEM PROVIDED HISTORY: Fall; TECHNOLOGIST PROVIDED HISTORY: Fall; Decision Support Exception - unselect if not a suspected or confirmed emergency medical condition->Emergency Medical Condition (MA) Reason for Exam: fall, ha FINDINGS: BRAIN/VENTRICLES: There is no acute intracranial hemorrhage, mass effect or midline shift. No abnormal extra-axial fluid collection. The gray-white differentiation is maintained without evidence of an acute infarct. There is no evidence of hydrocephalus. ORBITS: The visualized portion of the orbits demonstrate no acute abnormality. SINUSES: The visualized paranasal sinuses and mastoid air cells demonstrate no acute abnormality. SOFT TISSUES/SKULL:  No acute abnormality of the visualized skull or soft tissues. No acute intracranial abnormality. XR SHOULDER LEFT (MIN 2 VIEWS)    Result Date: 2/11/2023  EXAMINATION: 3 XRAY VIEWS OF THE LEFT SHOULDER 2/11/2023 9:13 pm COMPARISON: 04/08/2021 HISTORY: ORDERING SYSTEM PROVIDED HISTORY: Fall TECHNOLOGIST PROVIDED HISTORY: Fall Reason for Exam: pt experienced loss of consciousness,fall,pain in left shoulder,right hip and pelvis,and right lower leg FINDINGS: Negative for fracture or dislocation. Acromioclavicular arthropathy.   No widening of the acromioclavicular joint. No fracture     XR CHEST PORTABLE    Result Date: 1/29/2023  EXAMINATION: ONE XRAY VIEW OF THE CHEST 1/29/2023 11:19 am COMPARISON: 27 January 2023 HISTORY: ORDERING SYSTEM PROVIDED HISTORY: sob TECHNOLOGIST PROVIDED HISTORY: sob Reason for Exam: port upright FINDINGS: AP portable view of the chest time stamped at 1044 hours demonstrates overlying cardiac monitoring electrodes and prior median sternotomy. Compression plate and screws are noted at the base of the neck. Stable cardiomegaly is noted well as vascular congestion and bilateral effusions with bibasilar opacities consistent with pulmonary edema. Effusion on the right appears larger. No extrapleural air. Cardiomegaly, vascular congestion and effusions with appearance favoring edema with worsening basilar opacities. No extrapleural air. XR CHEST PORTABLE    Result Date: 1/27/2023  EXAMINATION: ONE XRAY VIEW OF THE CHEST 1/27/2023 9:45 am COMPARISON: 01/25/2023 HISTORY: ORDERING SYSTEM PROVIDED HISTORY: dyspnea TECHNOLOGIST PROVIDED HISTORY: Dyspnea FINDINGS: Median sternotomy wires are noted. Cardiomegaly, vascular congestion, and small pleural effusions are not significantly changed. No evidence of pneumothorax. No significant interval change. Vascular congestion and small pleural effusions. XR CHEST PORTABLE    Result Date: 1/25/2023  EXAMINATION: ONE XRAY VIEW OF THE CHEST 1/25/2023 11:20 am COMPARISON: 24 January 2023 HISTORY: ORDERING SYSTEM PROVIDED HISTORY: pulm edema TECHNOLOGIST PROVIDED HISTORY: pulm edema Reason for Exam: uprt port chest FINDINGS: AP portable view of the chest time stamped at 1102 hours demonstrates overlying cardiac monitoring electrodes and prior median sternotomy. Cardiomegaly and vascular congestion are noted with bilateral effusions with mild interval worsening since prior study. No extrapleural air. Osseous structures are age-appropriate.      Appearance favoring worsening [place] vascular congestion/interstitial edema. XR HIP 2-3 VW W PELVIS RIGHT    Result Date: 2/11/2023  EXAMINATION: ONE XRAY VIEW OF THE PELVIS AND TWO XRAY VIEWS RIGHT HIP 2/11/2023 9:19 pm COMPARISON: None. HISTORY: ORDERING SYSTEM PROVIDED HISTORY: Fall TECHNOLOGIST PROVIDED HISTORY: Fall Reason for Exam: pt experienced loss of consciousness,fall,pain in left shoulder,right hip and pelvis,and right lower leg FINDINGS: No fracture or dislocation. Dense vascular calcifications. No widening of the SI joints or the pubic symphysis. Previous lumbar fixation. No visualized fracture         ASSESSMENT/PLAN       Principal Problem:    Syncope and collapse  Active Problems:    Type 2 diabetes mellitus with hyperglycemia, with long-term current use of insulin (HCC)  Resolved Problems:    * No resolved hospital problems. *      1. Cardio, neuro, nephro consult  2. Pt will be getting stress test Monday, NPO midnight  3. Cardio reduced Coreg Norvasc doses  4. Orthostatics, neurochecks  5. Lantus 45 units BID, high dose sliding scale, hypoglycemia protocol, POCT glucose  6. Hold linzess, nephro stopping sodium bicarb  7. Repeat cxr tomorrow am  8. Tessalon, delsym for cough  9. PO potassium per nephro    DVT prophylaxis: heparin (porcine) injection 5,000 TID  GI prophylaxis: Protonix 40 mg daily      Consultations:   Consults: IP CONSULT TO PRIMARY CARE PROVIDER  IP CONSULT TO SOCIAL WORK  IP CONSULT TO NEUROLOGY  IP CONSULT TO CARDIOLOGY  IP CONSULT TO NEPHROLOGY  PT/OT       The severity of this patient's signs and symptoms (specify syncope & collapse) indicate the need for an inpatient admission.       Above plan discussed with the patient       Sonam Harrell MD  2/12/2023 11:43 AM

## 2023-02-12 NOTE — TELEPHONE ENCOUNTER
Writer contacted Dr. Raghavendra Peralta to inform of 30 day readmission risk. No Decision on disposition at this time.     Call Back: If you need to call back to inform of disposition you can contact me at [phone]

## 2023-02-12 NOTE — ED PROVIDER NOTES
EMERGENCY DEPARTMENT ENCOUNTER   ATTENDING ATTESTATION     Pt Name: Sunny Rivera  MRN: [id number]  [place] 1960  Date of evaluation: 2/11/23       Sunny Rivera is a 57 y.o. female who presents with Loss of Consciousness    Multiple medical problems including hypertension, hyperlipidemia, diabetes, CAD with CABG, CKD, diastolic heart failure    Had a syncopal episode and fall at her nursing facility    Will obtain syncope work-up trauma work-up and admit      MDM:     Has a mildly elevated troponin with a history of the same    In the setting of CKD    No active chest pain no EKG changes we will trend and admit for telemetry and syncope work-up    Vitals:   Vitals:    02/11/23 2003   BP: (!) 107/59   Pulse: 60   Resp: 18   Temp: 98.1 °F (36.7 °C)   TempSrc: Oral   SpO2: 96%   Weight: 248 lb (112.5 kg)         I personally saw and examined the patient. I have reviewed and agree with the resident's findings, including all diagnostic interpretations and treatment plan as written. I was present for the key portions of any procedures performed and the inclusive time noted for any critical care statement. The care is provided during an unprecedented national emergency due to the novel coronavirus, COVID 19.   Jazmin Tyson MD  Attending Emergency Physician           Jazmin Tyson MD  02/11/23 3841

## 2023-02-12 NOTE — ED NOTES
Pt. To the ED via EMS after fall d/t syncopal episode at facility. Pt. States that she was getting up to use the restroom and while reaching for her walker she passed out. Pt. States that she was just recently at [facility] d/t CHF exacerbation and was sent to a facility for rehab. Pt. Is complaining of R leg pain, L shoulder pain, neck and back pain. Pt. States that she has chronic pain but this does feel different than her chronic pain. Pt. Doesn't believe that she hit her head. Pt. Is A&Ox4, RR even and non-labored, placed on full cardiac monitor. Dr. Andreas Sullivan and Dr. Aydee Patton have been at bedside to assess.       Sierra Hassan RN  02/11/23 2040

## 2023-02-12 NOTE — PROGRESS NOTES
02/12/23 1527   Encounter Summary   Encounter Overview/Reason  Volunteer Encounter   Service Provided For: Patient   Referral/Consult From: Rounding   Last Encounter  02/12/23  (V)   Complexity of Encounter Low   Spiritual/Emotional needs   Type Spiritual Support   Rituals, Rites and [address]

## 2023-02-12 NOTE — PLAN OF CARE
Problem: Discharge Planning  Goal: Discharge to home or other facility with appropriate resources  Flowsheets (Taken 2/12/2023 0116)  Discharge to home or other facility with appropriate resources:   Identify barriers to discharge with patient and caregiver   Arrange for needed discharge resources and transportation as appropriate   Identify discharge learning needs (meds, wound care, etc)     Problem: Pain  Goal: Verbalizes/displays adequate comfort level or baseline comfort level  Flowsheets (Taken 2/12/2023 0116)  Verbalizes/displays adequate comfort level or baseline comfort level:   Encourage patient to monitor pain and request assistance   Assess pain using appropriate pain scale   Administer analgesics based on type and severity of pain and evaluate response     Problem: Safety - Adult  Goal: Free from fall injury  Flowsheets (Taken 2/12/2023 0116)  Free From Fall Injury: Instruct family/caregiver on patient safety     Problem: ABCDS Injury Assessment  Goal: Absence of physical injury  Flowsheets  Taken 2/12/2023 0116  Absence of Physical Injury: Implement safety measures based on patient assessment  Taken 2/12/2023 0029  Absence of Physical Injury: Implement safety measures based on patient assessment

## 2023-02-12 NOTE — CARE COORDINATION
Writer placed new inpatient consult to Dr. Dill Nurse covering for Dr. Sohrty Lopez neuro and Dr. Ovidio Lowe covering for Dr. Lenore Garza cardiology.

## 2023-02-12 NOTE — CONSULTS
NEPHROLOGY CONSULT       Reason for Consult:Renal insufficiency, CKD stage IV      Chief Complaint: Syncopy   History Obtained From: Patient and EHR records,    History of Present Illness: This is a 57 y.o. female with history of hypertension, longstanding diabetes mellitus with diabetic nephropathy, Coronary artery disease status post CABG x 3  Chronic CHF with diastolic dysfunction, who presents the ER of [facility] with syncopal episode. Patient has underlying CKD stage IV secondary to diabetic nephropathy. and previous history of dialysis dependency which was  discontinued Aug 2022 due to regaining residual kidney function. She recently was discharged from [facility] on 2/8/2023  after presenting with weight gain and shortness of breath, acute hypoxic respiratory failure secondary to decompensated acute on chronic diastolic heart failure and Hypertensive urgency. Patient was optimally diuresed and  was discharged to [address] [place] [address] on a regimen of Bumex 3 mg twice daily and Metolazone 5 mg every other day. She reports passing out at the [facility] while standing up and attempting to walk across her room yesterday. She has recent onset of lightheadedness 4 days prior . She was  hypotensive and reports two of her  blood pressure medications were held. She  was found to have a UTI isolating  Enterococcus faecalis on 2/7/2022 sensitive to Cipro and commenced on treatment with ciprofloxacin. She denies any flank pain. She has been experiencing dysuria and urgency. No fever, nausea vomiting . She has mild decrease in oral intake. Serum creatinine had risen to 3.3 mg/dl on 2/7/2023 and on presentation to [facility] on 2/11/2023 creatinine was 3.04 mg/dL. Creatinine today is 2.96 mg/dL with BUN of 65, potassium 3.4 and sodium of 145. She has normal white count and hemoglobin of 10.6    She was previously on dialysis initiated in August 2021 following episode of ATN after contrast exposure. She had a brief period of transient recovery of renal function between February and April 2022 Following which dialysis was restarted due to volume overload resistant to diuretic treatment. 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 She was given a trial off dialysis in August 2022 and has not required dialysis since then. Volume overload has been managed with diuretics. Patient's current baseline creatinine has ranged between  2.6 to 3.0 mg/dL. She had previously followed up with Dr. Irene Park and currently receives nephrological care with [facility].         Past Medical History:        Diagnosis Date    Arthritis     Backache, unspecified     Cerebral artery occlusion with cerebral infarction [facility])     CHF (congestive heart failure) (HCC)     Chronic kidney disease     Coronary atherosclerosis of artery bypass graft     COVID 1/31/2022    Cramp of limb     Gallstones     Hemodialysis patient ([facility]     Hyperlipidemia     Hypertension     Insomnia     Neuromuscular disorder ([facility]     Pneumonia     Psychiatric problem     Thyroid disease     Type II or unspecified type diabetes mellitus with renal manifestations, not stated as uncontrolled(250.40)     Type II or unspecified type diabetes mellitus without mention of complication, not stated as uncontrolled     Unspecified vitamin D deficiency        Past Surgical History:        Procedure Laterality Date    ANKLE FRACTURE SURGERY      AV FISTULA CREATION  12/14/2021    BACK SURGERY      BREAST SURGERY      CARDIAC SURGERY      CARPAL TUNNEL RELEASE      CHOLECYSTECTOMY, OPEN N/A     COLONOSCOPY      CORONARY ARTERY BYPASS GRAFT      x3    DIALYSIS FISTULA CREATION Left 12/14/2021    LEFT AV FISTULA CREATION UPPER EXTREMITY performed by Hunter Ann MD at [address]      IR TUNNELED CATHETER PLACEMENT GREATER THAN 5 YEARS  8/18/2021    IR TUNNELED CATHETER PLACEMENT GREATER THAN 5 YEARS 8/18/2021 [facility] SPECIAL PROCEDURES    KNEE ARTHROSCOPY      right    OTHER SURGICAL HISTORY  04/06/2022    cvc exchange    TONSILLECTOMY         Current Medications:    atorvastatin (LIPITOR) tablet 40 mg, Nightly  aspirin chewable tablet 81 mg, Daily  docusate sodium (COLACE) capsule 100 mg, BID  linaclotide (LINZESS) capsule 145 mcg, QAM AC  pantoprazole (PROTONIX) tablet 40 mg, QAM AC  tamsulosin (FLOMAX) capsule 0.4 mg, Daily  allopurinol (ZYLOPRIM) tablet 100 mg, Daily  sodium chloride flush 0.9 % injection 5-40 mL, 2 times per day  sodium chloride flush 0.9 % injection 5-40 mL, PRN  0.9 % sodium chloride infusion, PRN  polyethylene glycol (GLYCOLAX) packet 17 g, Daily PRN  acetaminophen (TYLENOL) tablet 650 mg, Q6H PRN   Or  acetaminophen (TYLENOL) suppository 650 mg, Q6H PRN  ondansetron (ZOFRAN-ODT) disintegrating tablet 4 mg, Q8H PRN   Or  ondansetron (ZOFRAN) injection 4 mg, Q6H PRN  heparin (porcine) injection 5,000 Units, 3 times per day  glucose chewable tablet 16 g, PRN  dextrose bolus 10% 125 mL, PRN   Or  dextrose bolus 10% 250 mL, PRN  glucagon (rDNA) injection 1 mg, PRN  dextrose 10 % infusion, Continuous PRN  gabapentin (NEURONTIN) capsule 300 mg, Daily  sodium bicarbonate tablet 1,300 mg, BID  carvedilol (COREG) tablet 6.25 mg, BID  amLODIPine (NORVASC) tablet 5 mg, Daily  insulin glargine (LANTUS) injection vial 45 Units, BID  insulin lispro (HUMALOG) injection vial 0-16 Units, TID WC  insulin lispro (HUMALOG) injection vial 0-4 Units, Nightly        Allergies:  Adhesive tape, Isosorbide dinitrate, Ranexa [ranolazine], Metformin and related, Ace inhibitors, Iv dye [iodides], Nsaids, and Metformin and related    Social History:   Social History     Socioeconomic History    Marital status: Single     Spouse name: Not on file    Number of children: Not on file    Years of education: Not on file    Highest education level: Not on file   Occupational History    Not on file   Tobacco Use    Smoking status: Never    Smokeless tobacco: Never   Vaping Use    Vaping Use: Never used   Substance and Sexual Activity    Alcohol use: No    Drug use: No    Sexual activity: Not Currently   Other Topics Concern    Not on file   Social History Narrative    Not on file     Social Determinants of Health     Financial Resource Strain: Low Risk     Difficulty of Paying Living Expenses: Not very hard   Food Insecurity: No Food Insecurity    Worried About Running Out of Food in the Last Year: Never true    Ran Out of Food in the Last Year: Never true   Transportation Needs: No Transportation Needs    Lack of Transportation (Medical): No    Lack of Transportation (Non-Medical): No   Physical Activity: Insufficiently Active    Days of Exercise per Week: 1 day    Minutes of Exercise per Session: 20 min   Stress: Stress Concern Present    Feeling of Stress : To some extent   Social Connections: Socially Isolated    Frequency of Communication with Friends and Family: More than three times a week    Frequency of Social Gatherings with Friends and Family: More than three times a week    Attends [employer] Services: Never    Active Member of Clubs or Organizations: No    Attends Club or Organization Meetings: Never    Marital Status: Never    Intimate Partner Violence: Not on file   Housing Stability: Low Risk     Unable to Pay for Housing in the Last Year: No    Number of [place] in the Last Year: 1    Unstable Housing in the Last Year: No       Family History:   Family History   Problem Relation Age of Onset    Diabetes Father     Heart Failure Father        Review of Systems:    Constitutional: No fever, no chills, no night sweats, fatigue, generalized weakness, loss of appetite  HEENT:  No headache, otalgia, itchy eyes, epistaxis, nasal discharge or sore throat.   Cardiac:  No chest pain, dyspnea, orthopnea or PND, palpitations  Chest:  No cough, hemoptysis, pleuritic chest pain, wheezing,SOB  Abdomen:  No abdominal pain, nausea, vomiting, diarrhea, melena, dysphagia hematemesis,constipation, abdominal bloating, flank pain  Neuro:  No CVA, TIA or seizure like activity. Skin:   No rashes, no itching. :   No hematuria, no pyuria, no dysuria, no flank pain. Extremities:  No swelling or joint pains. Objective:  CURRENT TEMPERATURE:  Temp: 98.4 °F (36.9 °C)  MAXIMUM TEMPERATURE OVER 24HRS:  Temp (24hrs), Av.2 °F (36.8 °C), Min:98.1 °F (36.7 °C), Max:98.4 °F (36.9 °C)    CURRENT RESPIRATORY RATE:  Resp: 20  CURRENT PULSE:  Heart Rate: 65  CURRENT BLOOD PRESSURE:  BP: 117/68  24HR BLOOD PRESSURE RANGE:  Systolic (18NUX), BZL:830 , Min:107 , HKE:404   ; Diastolic (11OPL), GCJ:72, Min:59, Max:68    24HR INTAKE/OUTPUT:    Intake/Output Summary (Last 24 hours) at 2023 1704  Last data filed at 2023 1534  Gross per 24 hour   Intake --   Output 2 ml   Net -2 ml     Patient Vitals for the past 96 hrs (Last 3 readings):   Weight   23 248 lb (112.5 kg)         Physical Exam:  GENERAL APPEARANCE: Alert and cooperative, and appears to be in no acute distress. HEAD: normocephalic  EYES: PERRL, EOMI. Not pale, anicteric   NOSE:  No nasal discharge. THROAT:  Oral cavity and pharynx normal. Moist  NECK: Neck supple, non-tender without lymphadenopathy, masses or thyromegaly. JVD-neg  CARDIAC: Normal S1 and S2. No S3, S4 or murmurs. Rhythm is regular. LUNGS: Clear to auscultation and percussion without rales, rhonchi, wheezing or diminished breath sounds. ABD-Soft non distended, BS+ Non tender no organomegally  BACK: Examination of the spine reveals  no spinal deformity, without tenderness,   MUSKULOSKELETAL: Adequately aligned spine. No joint erythema or tenderness. EXTREMITIES: No edema. Peripheral pulses intact.    NEURO:Alert oriented x 3 ,power 5/5 in all extremities      Labs:   CBC:  Recent Labs     23  2130 23  0607   WBC 8.7 7.6   RBC 3.80* 3.38*   HGB 11.9* 10.6*   HCT 35.8* 31.9*   MCV 94.3 94.4   MCH 31.3 31.4   MCHC 33.2 33.3   RDW 15.2* 15.0*    197   MPV 8.9 9.0      BMP:   Recent Labs     02/11/23  2130 02/12/23  0607    145*   K 3.5* 3.4*    101   CO2 34* 33*   BUN 69* 65*   CREATININE 3.04* 2.96*   GLUCOSE 127* 93   CALCIUM 9.0 8.8        Phosphorus:  No results for input(s): PHOS in the last 72 hours. Magnesium:   Recent Labs     02/12/23  0607   MG 2.2     Albumin: No results for input(s): LABALBU in the last 72 hours. IRON:    Lab Results   Component Value Date/Time    IRON 83 09/27/2022 01:32 PM     Iron Saturation:  No components found for: PERCENTFE  TIBC:    Lab Results   Component Value Date/Time    TIBC 217 09/27/2022 01:32 PM     FERRITIN:    Lab Results   Component Value Date/Time    FERRITIN 117 09/27/2022 01:32 PM     SPEP:   Lab Results   Component Value Date/Time    PROT 6.4 02/07/2023 05:45 AM     UPEP:   Lab Results   Component Value Date/Time    TPU 20 11/12/2021 10:30 AM        C3: No results found for: C3  C4: No results found for: C4  MPO ANCA:  No results found for: MPO .   PR3 ANCA:  No results found for: PR3  Urine Sodium:    Lab Results   Component Value Date/Time    JASON 47 11/14/2021 02:04 AM      Urine Potassium:  No results found for: KUR  Urine Chloride:  No results found for: CLU  Urine Ph:  No components found for: PO4U  Urine Osmolarity:  No results found for: OSMOU  Urine Creatinine:    Lab Results   Component Value Date/Time    LABCREA 65.4 01/26/2023 01:10 PM     Urine Eosinophils: No components found for: EOSU  Urine Protein:    Lab Results   Component Value Date/Time    TPU 20 11/12/2021 10:30 AM     Urinalysis:  U/A:   Lab Results   Component Value Date/Time    NITRU NEGATIVE 02/07/2023 03:00 PM    COLORU Yellow 02/07/2023 03:00 PM    PHUR 5.5 02/07/2023 03:00 PM    WBCUA 2 TO 5 02/07/2023 03:00 PM    RBCUA None 02/07/2023 03:00 PM    MUCUS 1+ 02/07/2023 03:00 PM TRICHOMONAS NOT REPORTED 11/14/2021 02:05 AM    YEAST FEW 02/07/2023 03:00 PM    BACTERIA MANY 02/07/2023 03:00 PM    SPECGRAV 1.016 02/07/2023 03:00 PM    LEUKOCYTESUR SMALL 02/07/2023 03:00 PM    UROBILINOGEN Normal 02/07/2023 03:00 PM    BILIRUBINUR NEGATIVE 02/07/2023 03:00 PM    GLUCOSEU NEGATIVE 02/07/2023 03:00 PM    KETUA NEGATIVE 02/07/2023 03:00 PM    AMORPHOUS 2+ 07/31/2022 12:00 PM         Radiology:  Reviewed as available. Assessment:  1. Diabetic nephropathy associated with chronic kidney disease stage IV ,off  dialysis since August 2022  due to regaining residual kidney function with baseline creatinine range of 2.6 to 3.0 mg/dL[eGFR 17-20 mls/min]. 2.Syncopal episode likely orthostatic in nature secondary to hypotension and recent onset of UTI-Rule out cardiac causes. 3. Essential hypertension-stable today    4. Chronic diastolic heart failure-appears compensated-on bumex 3 mg bid in the O/P.    5, CAD s/p CABG, last cath in October 2019[Patent grafts]    6. UTI  with Enterococcus faecalis sensitive to ciprofloxacin    7. Hypokalemia with mild hypernatremia secondary to loop diuretic    8. Chronic anemia of CKD    9. Metabolic alkalosis reflecting contraction alkalosis 2nd to loop diuretic . Plan:  1. Restart Bumex at lower dose of 2 mg twice daily  and increase dose as BP tolerates -hold metolazone  2. Decrease  amlodipine to 5 mg daily and carvedilol to 6.25 mg twice daily  3. Myocardial stress test tomorrow by  Cardiology  4. Check orthostatic blood pressure and pulse  5. P. O  potassium chloride 30 mEq x 1  6. Repeat a urine culture  7. Basic metabolic panel daily  8. 2199 mils fluid restriction and 2 g sodium diet  9. Hold oral sodium bicarbonate patient due to  metabolic alkalosis-Adjust dose  on discharge. Thank you for the consultation.       Electronically signed by Helen Garcia MD on 2/12/2023 at 5:04 PM

## 2023-02-13 ENCOUNTER — APPOINTMENT (OUTPATIENT)
Dept: NUCLEAR MEDICINE | Age: 63
DRG: 247 | End: 2023-02-13
Payer: COMMERCIAL

## 2023-02-13 ENCOUNTER — APPOINTMENT (OUTPATIENT)
Dept: NON INVASIVE DIAGNOSTICS | Age: 63
DRG: 247 | End: 2023-02-13
Payer: COMMERCIAL

## 2023-02-13 ENCOUNTER — APPOINTMENT (OUTPATIENT)
Dept: GENERAL RADIOLOGY | Age: 63
DRG: 247 | End: 2023-02-13
Payer: COMMERCIAL

## 2023-02-13 LAB
ABSOLUTE EOS #: 0.3 K/UL (ref 0–0.4)
ABSOLUTE LYMPH #: 0.8 K/UL (ref 1–4.8)
ABSOLUTE MONO #: 0.5 K/UL (ref 0.1–1.3)
ANION GAP SERPL CALCULATED.3IONS-SCNC: 10 MMOL/L (ref 9–17)
BASOPHILS # BLD: 1 % (ref 0–2)
BASOPHILS ABSOLUTE: 0.1 K/UL (ref 0–0.2)
BUN SERPL-MCNC: 70 MG/DL (ref 8–23)
CALCIUM SERPL-MCNC: 8.8 MG/DL (ref 8.6–10.4)
CHLORIDE SERPL-SCNC: 101 MMOL/L (ref 98–107)
CO2 SERPL-SCNC: 32 MMOL/L (ref 20–31)
CREAT SERPL-MCNC: 3.12 MG/DL (ref 0.5–0.9)
EKG ATRIAL RATE: 59 BPM
EKG P AXIS: 70 DEGREES
EKG P-R INTERVAL: 160 MS
EKG Q-T INTERVAL: 492 MS
EKG QRS DURATION: 88 MS
EKG QTC CALCULATION (BAZETT): 487 MS
EKG R AXIS: 71 DEGREES
EKG T AXIS: -8 DEGREES
EKG VENTRICULAR RATE: 59 BPM
EOSINOPHILS RELATIVE PERCENT: 4 % (ref 0–4)
GFR SERPL CREATININE-BSD FRML MDRD: 16 ML/MIN/1.73M2
GLUCOSE BLD-MCNC: 149 MG/DL (ref 65–105)
GLUCOSE BLD-MCNC: 186 MG/DL (ref 65–105)
GLUCOSE BLD-MCNC: 219 MG/DL (ref 65–105)
GLUCOSE BLD-MCNC: 324 MG/DL (ref 65–105)
GLUCOSE SERPL-MCNC: 212 MG/DL (ref 70–99)
HCT VFR BLD AUTO: 32.7 % (ref 36–46)
HGB BLD-MCNC: 10.9 G/DL (ref 12–16)
LV EF: 60 %
LVEF MODALITY: NORMAL
LYMPHOCYTES # BLD: 14 % (ref 24–44)
MCH RBC QN AUTO: 31.4 PG (ref 26–34)
MCHC RBC AUTO-ENTMCNC: 33.2 G/DL (ref 31–37)
MCV RBC AUTO: 94.7 FL (ref 80–100)
MONOCYTES # BLD: 8 % (ref 1–7)
PDW BLD-RTO: 15.1 % (ref 11.5–14.9)
PLATELET # BLD AUTO: 182 K/UL (ref 150–450)
PMV BLD AUTO: 8.9 FL (ref 6–12)
POTASSIUM SERPL-SCNC: 3.7 MMOL/L (ref 3.7–5.3)
RBC # BLD: 3.46 M/UL (ref 4–5.2)
REASON FOR REJECTION: NORMAL
SEG NEUTROPHILS: 73 % (ref 36–66)
SEGMENTED NEUTROPHILS ABSOLUTE COUNT: 4.4 K/UL (ref 1.3–9.1)
SODIUM SERPL-SCNC: 143 MMOL/L (ref 135–144)
WBC # BLD AUTO: 6 K/UL (ref 3.5–11)
ZZ NTE CLEAN UP: ORDERED TEST: NORMAL
ZZ NTE WITH NAME CLEAN UP: SPECIMEN SOURCE: NORMAL

## 2023-02-13 PROCEDURE — A9500 TC99M SESTAMIBI: HCPCS | Performed by: INTERNAL MEDICINE

## 2023-02-13 PROCEDURE — 6360000002 HC RX W HCPCS: Performed by: STUDENT IN AN ORGANIZED HEALTH CARE EDUCATION/TRAINING PROGRAM

## 2023-02-13 PROCEDURE — 3430000000 HC RX DIAGNOSTIC RADIOPHARMACEUTICAL: Performed by: INTERNAL MEDICINE

## 2023-02-13 PROCEDURE — 97166 OT EVAL MOD COMPLEX 45 MIN: CPT

## 2023-02-13 PROCEDURE — 6370000000 HC RX 637 (ALT 250 FOR IP): Performed by: STUDENT IN AN ORGANIZED HEALTH CARE EDUCATION/TRAINING PROGRAM

## 2023-02-13 PROCEDURE — 6360000002 HC RX W HCPCS: Performed by: INTERNAL MEDICINE

## 2023-02-13 PROCEDURE — 87086 URINE CULTURE/COLONY COUNT: CPT

## 2023-02-13 PROCEDURE — 78452 HT MUSCLE IMAGE SPECT MULT: CPT

## 2023-02-13 PROCEDURE — 1200000000 HC SEMI PRIVATE

## 2023-02-13 PROCEDURE — 6370000000 HC RX 637 (ALT 250 FOR IP): Performed by: INTERNAL MEDICINE

## 2023-02-13 PROCEDURE — 2580000003 HC RX 258: Performed by: STUDENT IN AN ORGANIZED HEALTH CARE EDUCATION/TRAINING PROGRAM

## 2023-02-13 PROCEDURE — 36415 COLL VENOUS BLD VENIPUNCTURE: CPT

## 2023-02-13 PROCEDURE — 71045 X-RAY EXAM CHEST 1 VIEW: CPT

## 2023-02-13 PROCEDURE — 82947 ASSAY GLUCOSE BLOOD QUANT: CPT

## 2023-02-13 PROCEDURE — 93010 ELECTROCARDIOGRAM REPORT: CPT | Performed by: INTERNAL MEDICINE

## 2023-02-13 PROCEDURE — 97530 THERAPEUTIC ACTIVITIES: CPT

## 2023-02-13 PROCEDURE — 97162 PT EVAL MOD COMPLEX 30 MIN: CPT

## 2023-02-13 PROCEDURE — 85025 COMPLETE CBC W/AUTO DIFF WBC: CPT

## 2023-02-13 PROCEDURE — 2580000003 HC RX 258: Performed by: INTERNAL MEDICINE

## 2023-02-13 PROCEDURE — 99233 SBSQ HOSP IP/OBS HIGH 50: CPT | Performed by: FAMILY MEDICINE

## 2023-02-13 PROCEDURE — 80048 BASIC METABOLIC PNL TOTAL CA: CPT

## 2023-02-13 PROCEDURE — 93017 CV STRESS TEST TRACING ONLY: CPT

## 2023-02-13 PROCEDURE — 97535 SELF CARE MNGMENT TRAINING: CPT

## 2023-02-13 RX ORDER — TECHNETIUM TC-99M SESTAMIBI 1 MG/10ML
16.7 INJECTION INTRAVENOUS
Status: COMPLETED | OUTPATIENT
Start: 2023-02-13 | End: 2023-02-13

## 2023-02-13 RX ORDER — METOPROLOL TARTRATE 5 MG/5ML
5 INJECTION INTRAVENOUS EVERY 5 MIN PRN
Status: ACTIVE | OUTPATIENT
Start: 2023-02-13 | End: 2023-02-13

## 2023-02-13 RX ORDER — NITROGLYCERIN 0.4 MG/1
0.4 TABLET SUBLINGUAL EVERY 5 MIN PRN
Status: ACTIVE | OUTPATIENT
Start: 2023-02-13 | End: 2023-02-13

## 2023-02-13 RX ORDER — ATROPINE SULFATE 0.1 MG/ML
0.5 INJECTION INTRAVENOUS EVERY 5 MIN PRN
Status: ACTIVE | OUTPATIENT
Start: 2023-02-13 | End: 2023-02-13

## 2023-02-13 RX ORDER — LANOLIN ALCOHOL/MO/W.PET/CERES
10 CREAM (GRAM) TOPICAL NIGHTLY PRN
Status: DISCONTINUED | OUTPATIENT
Start: 2023-02-13 | End: 2023-02-13

## 2023-02-13 RX ORDER — SODIUM CHLORIDE 0.9 % (FLUSH) 0.9 %
5-40 SYRINGE (ML) INJECTION PRN
Status: ACTIVE | OUTPATIENT
Start: 2023-02-13 | End: 2023-02-13

## 2023-02-13 RX ORDER — ALBUTEROL SULFATE 90 UG/1
2 AEROSOL, METERED RESPIRATORY (INHALATION) PRN
Status: ACTIVE | OUTPATIENT
Start: 2023-02-13 | End: 2023-02-13

## 2023-02-13 RX ORDER — SODIUM CHLORIDE 0.9 % (FLUSH) 0.9 %
10 SYRINGE (ML) INJECTION PRN
Status: DISCONTINUED | OUTPATIENT
Start: 2023-02-13 | End: 2023-02-17 | Stop reason: HOSPADM

## 2023-02-13 RX ORDER — LANOLIN ALCOHOL/MO/W.PET/CERES
9 CREAM (GRAM) TOPICAL NIGHTLY PRN
Status: DISCONTINUED | OUTPATIENT
Start: 2023-02-13 | End: 2023-02-17 | Stop reason: HOSPADM

## 2023-02-13 RX ORDER — AMINOPHYLLINE DIHYDRATE 25 MG/ML
50 INJECTION, SOLUTION INTRAVENOUS PRN
Status: ACTIVE | OUTPATIENT
Start: 2023-02-13 | End: 2023-02-13

## 2023-02-13 RX ORDER — SODIUM CHLORIDE 9 MG/ML
500 INJECTION, SOLUTION INTRAVENOUS CONTINUOUS PRN
Status: ACTIVE | OUTPATIENT
Start: 2023-02-13 | End: 2023-02-13

## 2023-02-13 RX ORDER — TECHNETIUM TC-99M SESTAMIBI 1 MG/10ML
35 INJECTION INTRAVENOUS
Status: COMPLETED | OUTPATIENT
Start: 2023-02-13 | End: 2023-02-13

## 2023-02-13 RX ADMIN — BUMETANIDE 2 MG: 1 TABLET ORAL at 20:34

## 2023-02-13 RX ADMIN — ATORVASTATIN CALCIUM 40 MG: 40 TABLET, FILM COATED ORAL at 20:35

## 2023-02-13 RX ADMIN — SODIUM CHLORIDE, PRESERVATIVE FREE 10 ML: 5 INJECTION INTRAVENOUS at 13:18

## 2023-02-13 RX ADMIN — DOCUSATE SODIUM 100 MG: 100 CAPSULE, LIQUID FILLED ORAL at 09:24

## 2023-02-13 RX ADMIN — ACETAMINOPHEN 650 MG: 325 TABLET ORAL at 00:27

## 2023-02-13 RX ADMIN — GABAPENTIN 300 MG: 300 CAPSULE ORAL at 09:24

## 2023-02-13 RX ADMIN — ACETAMINOPHEN 650 MG: 325 TABLET ORAL at 09:26

## 2023-02-13 RX ADMIN — SODIUM BICARBONATE 1300 MG: 650 TABLET ORAL at 20:34

## 2023-02-13 RX ADMIN — INSULIN LISPRO 4 UNITS: 100 INJECTION, SOLUTION INTRAVENOUS; SUBCUTANEOUS at 20:43

## 2023-02-13 RX ADMIN — Medication 30 MG: at 09:27

## 2023-02-13 RX ADMIN — CEFTRIAXONE SODIUM 1000 MG: 1 INJECTION, POWDER, FOR SOLUTION INTRAMUSCULAR; INTRAVENOUS at 19:32

## 2023-02-13 RX ADMIN — DOCUSATE SODIUM 100 MG: 100 CAPSULE, LIQUID FILLED ORAL at 20:34

## 2023-02-13 RX ADMIN — Medication 9 MG: at 20:33

## 2023-02-13 RX ADMIN — SODIUM CHLORIDE, PRESERVATIVE FREE 10 ML: 5 INJECTION INTRAVENOUS at 20:34

## 2023-02-13 RX ADMIN — TAMSULOSIN HYDROCHLORIDE 0.4 MG: 0.4 CAPSULE ORAL at 09:25

## 2023-02-13 RX ADMIN — BENZONATATE 100 MG: 100 CAPSULE ORAL at 20:34

## 2023-02-13 RX ADMIN — Medication 36.7 MILLICURIE: at 13:16

## 2023-02-13 RX ADMIN — AMLODIPINE BESYLATE 5 MG: 5 TABLET ORAL at 09:25

## 2023-02-13 RX ADMIN — SODIUM CHLORIDE, PRESERVATIVE FREE 10 ML: 5 INJECTION INTRAVENOUS at 09:27

## 2023-02-13 RX ADMIN — CARVEDILOL 6.25 MG: 6.25 TABLET, FILM COATED ORAL at 20:33

## 2023-02-13 RX ADMIN — ALLOPURINOL 100 MG: 100 TABLET ORAL at 09:25

## 2023-02-13 RX ADMIN — HEPARIN SODIUM 5000 UNITS: 5000 INJECTION INTRAVENOUS; SUBCUTANEOUS at 20:34

## 2023-02-13 RX ADMIN — CARVEDILOL 6.25 MG: 6.25 TABLET, FILM COATED ORAL at 09:25

## 2023-02-13 RX ADMIN — Medication 30 MG: at 20:33

## 2023-02-13 RX ADMIN — REGADENOSON 0.4 MG: 0.08 INJECTION, SOLUTION INTRAVENOUS at 10:16

## 2023-02-13 RX ADMIN — ASPIRIN 81 MG: 81 TABLET, CHEWABLE ORAL at 09:25

## 2023-02-13 RX ADMIN — INSULIN LISPRO 4 UNITS: 100 INJECTION, SOLUTION INTRAVENOUS; SUBCUTANEOUS at 11:50

## 2023-02-13 RX ADMIN — BUMETANIDE 2 MG: 1 TABLET ORAL at 09:25

## 2023-02-13 RX ADMIN — Medication 9 MG: at 00:27

## 2023-02-13 RX ADMIN — HEPARIN SODIUM 5000 UNITS: 5000 INJECTION INTRAVENOUS; SUBCUTANEOUS at 15:42

## 2023-02-13 RX ADMIN — Medication 16.7 MILLICURIE: at 10:19

## 2023-02-13 RX ADMIN — SODIUM BICARBONATE 1300 MG: 650 TABLET ORAL at 09:25

## 2023-02-13 ASSESSMENT — PAIN DESCRIPTION - LOCATION: LOCATION: HEAD

## 2023-02-13 ASSESSMENT — PAIN SCALES - GENERAL: PAINLEVEL_OUTOF10: 3

## 2023-02-13 ASSESSMENT — PAIN DESCRIPTION - DESCRIPTORS: DESCRIPTORS: ACHING

## 2023-02-13 NOTE — PROCEDURES
[address] [facility]                    [facility]                              CARDIAC STRESS TEST    PATIENT NAME: Burton Liu                  :        1960  MED REC NO:   [id number]                              ROOM:       2043  ACCOUNT NO:   [de-identified]                           ADMIT DATE: 2023  PROVIDER:     Smiley Miller    DATE OF STUDY:  2023    TEST TYPE: LEXISCAN CARDIOLYTE STRESS TEST  INDICATION: SYNCOPE  REFERRING PHYSICIAN: DYANA MOLINA    RESTING HEART RATE: 57 BEATS PER MINUTE  RESTING BLOOD PRESSURE: 134/43    MEDICATION(S) GIVEN: 0.4MG IV LEXISCAN  REASON FOR TERMINATION: MEDICATION INFUSION COMPLETE    RESTING EKG: ABNORMAL. SINUS RHYTHM 57 BEATS PER MINUTE. NON SPECIFIC  ST CHANGES. STRESS HEART RESPONSE: NORMAL  BLOOD PRESSURE RESPONSE: NORMAL  STRESS EKGs: NO CHANGES SEEN  CHEST DISCOMFORT: PAIN DURING STRESS. CHEST PAIN WITH HEAVINESS /10. ISCHEMIC EKG CHANGES: UNINTERPRETABLE    EKG IMPRESSION: ELECTROCARDIOGRAPHICALLY UNINTERPRETABLE FOR EKG CHANGES  LEXISCAN STRESS TEST. RADIOISOTOPE RESULTS TO FOLLOW FROM THE DEPARTMENT  OF NUCLEAR MEDICINE. COMMENTS: SHORTNESS OF BREATH ++. ABNORMAL PRETEST - ECG PRECLUDED. EKG CRITERIA FOR MYOCARDIAL ISCHEMIA.       Armand Luciano    D: 2023 14:08:54       T: 2023 14:09:58     AS/[email or web address]  Job#: [id number]     Doc#: Unknown    CC:    (Retain this field even if not dictated or not decipherable)

## 2023-02-13 NOTE — PROGRESS NOTES
Physical Therapy  Facility/Department: [facility] MED SURG  Physical Therapy Initial Assessment    Name: Lyndsay Archer  : 1960  MRN: [id number]  Date of Service: 2023    Discharge Recommendations:  Patient would benefit from continued therapy after discharge   PT Equipment Recommendations  Equipment Needed:  (TBD)      Patient Diagnosis(es): The encounter diagnosis was Syncope and collapse. Past Medical History:  has a past medical history of Arthritis, Backache, unspecified, Cerebral artery occlusion with cerebral infarction ([address] CHF (congestive heart failure) ([address] Chronic kidney disease, Coronary atherosclerosis of artery bypass graft, COVID, Cramp of limb, Gallstones, Hemodialysis patient ([address] Hyperlipidemia, Hypertension, Insomnia, Neuromuscular disorder ([address] Pneumonia, Psychiatric problem, Thyroid disease, Type II or unspecified type diabetes mellitus with renal manifestations, not stated as uncontrolled(250.40), Type II or unspecified type diabetes mellitus without mention of complication, not stated as uncontrolled, and Unspecified vitamin D deficiency. Past Surgical History:  has a past surgical history that includes Coronary artery bypass graft; Knee arthroscopy; Carpal tunnel release; Breast surgery; Tonsillectomy; Hand surgery; Ankle fracture surgery; Cholecystectomy, open (N/A); IR TUNNELED CVC PLACE WO SQ PORT/PUMP > 5 YEARS (2021); AV fistula creation (2021); Dialysis fistula creation (Left, 2021); other surgical history (2022); back surgery; Colonoscopy; eye surgery; fracture surgery; and Cardiac surgery. Assessment   Body Structures, Functions, Activity Limitations Requiring Skilled Therapeutic Intervention: Decreased functional mobility ; Decreased balance;Decreased strength;Decreased endurance; Increased pain  Assessment: continue per POC to maxmize potential for safe D/C  Treatment Diagnosis: impaired mobility due to syncope  Specific Instructions for Next Treatment: advance to gait as tolerated, continue exercise program and transfers using wheeled walker  Therapy Prognosis: Good  Decision Making: Medium Complexity  History: pt admitted due to syncope and collapse  Exam: ROM, MMT, balance and mobility assessments  Clinical Presentation: SBA for rolling and supine <> sit, CGA x 1 sit <> stand, CGA x 2 bed > BSC using wheeled walker, FALL RISK due to C/O dizziness  Requires PT Follow-Up: Yes  Activity Tolerance  Activity Tolerance: Patient limited by fatigue;Patient limited by endurance;Treatment limited secondary to medical complications (dizziness)     Plan   Physcial Therapy Plan  General Plan: 5-7 times per week  Specific Instructions for Next Treatment: advance to gait as tolerated, continue exercise program and transfers using wheeled walker  Current Treatment Recommendations: Strengthening, Equipment evaluation, education, & procurement, Balance training, Endurance training, Home exercise program, Safety education & training, Functional mobility training, Transfer training, Gait training, Patient/Caregiver education & training, Therapeutic activities  Safety Devices  Type of Devices: Nurse notified, Left in bed, Call light within reach (X-ray tech at bedside upon OT exit)  Restraints  Restraints Initially in Place: No     Restrictions  Restrictions/Precautions  Restrictions/Precautions: Fall Risk, Up as Tolerated (O2 at 3.5 L, AV fistula left forearm, midline right cephalic)  Required Braces or Orthoses?: No  Implants present? :  (denies, hx CABG ?  sternal wires)     Subjective   Pain: denies  General  Patient assessed for rehabilitation services?: Yes  Response To Previous Treatment: Not applicable  Family / Caregiver Present: No  Referring Practitioner: Dr. Luisa Palomo  Referral Date : 02/12/23  Diagnosis: syncope and collapse  Follows Commands: Within Functional Limits  Other (Comment): OK per nurse  General Comment  Comments: CT scan of the head showed no acute intracranial abnormalities. Subjective  Subjective: pt reported that she passed out at the nursing home when standing up to go to the bathroom. Pt passed out and fell to the floor.          Social/Functional History  Social/Functional History  Lives With: Parent (caregiver for mother)  Type of Home: Condo ([facility])  Home Layout: One level  Home Access: Ramped entrance  Bathroom Shower/Tub: Walk-in shower, Shower chair with back, Curtain  Bathroom Toilet: Handicap height  Bathroom Equipment: Grab bars in shower, Shower chair, Hand-held shower  Home Equipment: Cane, Rollator, Walker, rolling, Oxygen (O2 at 3.5L PRN)  Has the patient had two or more falls in the past year or any fall with injury in the past year?: Yes (at [employer])  Receives Help From: Family  ADL Assistance: Independent  Homemaking Assistance: Needs assistance ((Pt's sister assists with laundry, cooking, and cleaning))  Homemaking Responsibilities: Yes ((Pt's sister assists with laundry, cooking, and cleaning))  Ambulation Assistance: Independent (using rollator and O2 at 3.5 L)  Transfer Assistance: Independent  Active : Yes  Mode of Transportation: Car  Occupation: On disability  Leisure & Hobbies: scrapbooking  IADL Comments: sleeps in an adjustable bed  Additional Comments: was in rehab at [facility] prior to this admission,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  Vision/Hearing  Vision  Vision: Impaired  Vision Exceptions: Wears glasses for reading  Hearing  Hearing: Within functional limits    Cognition   Orientation  Overall Orientation Status: Within Functional Limits  Orientation Level: Oriented to place;Oriented to time;Oriented to situation;Oriented to person;Oriented X4  Cognition  Overall Cognitive Status: WFL     Objective   O2 Device: Nasal cannula  Comment: 3.5 L     Observation/Palpation  Observation: O2 at 3.5 L, AV fistula left forearm, midline right cephalic  Edema: LE edema bilateral LEs  Gross Assessment  Sensation: Impaired (C/O pins and needles and numbness bilateral feet and hands)     AROM RLE (degrees)  RLE AROM: WFL  AROM LLE (degrees)  LLE AROM : WFL  AROM RUE (degrees)  RUE General AROM: see OT for UE assessment  AROM LUE (degrees)  LUE General AROM: see OT for UE assessment  Strength RLE  Comment: : Grossly 4/5  Strength LLE  Comment: : Grossly 4/5  Strength RUE  Comment: see OT for UE assessment  Strength LUE  Comment: see OT for UE assessment           Bed mobility  Supine to Sit: Stand by assistance  Sit to Supine: Stand by assistance  Scooting: Stand by assistance  Bed Mobility Comments: HOB partially elevated with use of bedrail, pt dangled at the EOB w/ SBA due to C/O dizziness  Transfers  Sit to Stand: Contact guard assistance  Stand to Sit: Contact guard assistance  Stand Pivot Transfers: Contact guard assistance;2 Person Assistance (used wheeled walker, 2nd assist used for pivot bed > BSC for safety due to C/O  dizziness)  Comment: BSC transfer completed  Ambulation  Comments: deferred due to dizziness, pt placed back into bed as portable x-ray brought to room to be done after PT evaluation  Stairs/Curb  Stairs?: No (has ramp at home)     Balance  Sitting - Static: Good;-  Sitting - Dynamic: Good;-  Standing - Static: Good;- (used wheeled walker)  Standing - Dynamic: Good;- (used wheeled walker)           OutComes Score                                                  AM-PAC Score  AM-PAC Inpatient Mobility Raw Score : 12 (02/13/23 0842)  AM-PAC Inpatient T-Scale Score : 35.33 (02/13/23 0842)  Mobility Inpatient CMS 0-100% Score: 68.66 (02/13/23 0842)  Mobility Inpatient CMS G-Code Modifier : CL (02/13/23 0842)          Tinneti Score       Goals  Short Term Goals  Time Frame for Short Term Goals: 5-7 treatments/ week  Short Term Goal 1: pt to tolerate 1/2 hour of therapuetic exercise and activity  Short Term Goal 2: pt to demonstrate good technique for LE strengthening, balance  and energy conservation  Short Term Goal 3: pt to demonstrate MODIFIED  independent rolling in bed for position change  Short Term Goal 4: pt to demonstrate supine <> sit w/ supervision  Short Term Goal 5: pt to demonstrate sit <> stand and bed <> chair using wheeled walker w/ SBA x 1  Short Term Goal 6: pt to demonstrate gait 30-50' using wheeled walker w/ SBA x 1  Short Term Goal 7: pt to demonstrate good ambulatory balance using wheeled walker  Patient Goals   Patient Goals : therapy at nursing home at [address] [facility]  Patient Education  Education Given To: Patient  Education Provided: Role of Therapy;Plan of Care  Education Method: Demonstration;Verbal  Barriers to Learning: None  Education Outcome: Continued education needed      Therapy Time   Individual Concurrent Group Co-treatment   Time In 0842         Time Out 0906         Minutes 24         Timed Code Treatment Minutes: [address] [facility], PT

## 2023-02-13 NOTE — PROGRESS NOTES
[address]   Occupational Therapy Evaluation  Date: 23  Patient Name: Dipesh Johnston       Room: 2362/0133-49  MRN: [id number]  Account: [de-identified]   : 1960  (60 y.o.) Gender: female     Discharge Recommendations:  Further Occupational Therapy is recommended upon facility discharge. OT Equipment Recommendations  Other: TBD    Referring Practitioner: Antonieta Moya MD  Diagnosis: Syncope and collapse Additional Pertinent Hx: Dipesh Johnston is a 57 y.o. female who presents with EMS due to syncopal episode at her facility. Patient has syncopal episode at home was on the ground unable to get up when she alerted staff. On arrival patient is reporting left-sided shoulder pain, right-sided leg pain. Patient has chronic back pain denies any changes to that pain. Is unsure of if she struck her head, is on aspirin at home. Denies any nausea or vomiting lightheadedness or dizziness at this time. Patient states that she was attempting to get up to use the bathroom and was using her walker before she fell. Is any numbness, tingling or weakness    Treatment Diagnosis: impaired self care status    Past Medical History:  has a past medical history of Arthritis, Backache, unspecified, Cerebral artery occlusion with cerebral infarction ([address] CHF (congestive heart failure) ([address] Chronic kidney disease, Coronary atherosclerosis of artery bypass graft, COVID, Cramp of limb, Gallstones, Hemodialysis patient ([address] Hyperlipidemia, Hypertension, Insomnia, Neuromuscular disorder ([address] Pneumonia, Psychiatric problem, Thyroid disease, Type II or unspecified type diabetes mellitus with renal manifestations, not stated as uncontrolled(250.40), Type II or unspecified type diabetes mellitus without mention of complication, not stated as uncontrolled, and Unspecified vitamin D deficiency.     Past Surgical History:   has a past surgical history that includes Coronary artery bypass graft; Knee arthroscopy; Carpal tunnel release; Breast surgery; Tonsillectomy; Hand surgery; Ankle fracture surgery; Cholecystectomy, open (N/A); IR TUNNELED CVC PLACE WO SQ PORT/PUMP > 5 YEARS (8/18/2021); AV fistula creation (12/14/2021); Dialysis fistula creation (Left, 12/14/2021); other surgical history (04/06/2022); back surgery; Colonoscopy; eye surgery; fracture surgery; and Cardiac surgery. Restrictions  Restrictions/Precautions  Restrictions/Precautions: Fall Risk;Up as Tolerated (O2 at 3.5 L, AV fistula left forearm, midline right cephalic)  Required Braces or Orthoses?: No  Implants present? :  (denies, hx CABG ? sternal wires)      Vitals  Vitals  Heart Rate: 60  BP: (!) 136/51  MAP (Calculated): 79  Resp: 20  SpO2: 92 %  O2 Device: Nasal cannula  Comment: 3.5 L     Subjective  Subjective: \"they have been very swollen lately, I have a hard time getting my socks on\" pt reports in response to BLEs. Comments: DEDE Lima ok'd pt for OT/PT eval and treat. Pt agreeable to participate and pleasant/cooperative throughout.   Subjective  Pain: denies      Social/Functional History  Social/Functional History  Lives With: Parent (caregiver for mother)  Type of Home: [facility] ([address])  Home Layout: One level  Home Access: Ramped entrance  Bathroom Shower/Tub: Walk-in shower, Shower chair with back, Curtain  Bathroom Toilet: Handicap height  Bathroom Equipment: Grab bars in shower, Shower chair, Hand-held shower  Home Equipment: Cane, Rollator, Walker, rolling, Oxygen (O2 at 3.5L PRN)  Has the patient had two or more falls in the past year or any fall with injury in the past year?: Yes (at 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      Objective  Orientation  Overall Orientation Status: Within Functional Limits  Orientation Level: Oriented to place, Oriented to time, Oriented to situation, Oriented to person, Oriented X4  Cognition  Overall Cognitive Status: WFL   Sensation  Overall Sensation Status: WFL    ADL  Feeding: Setup  Grooming: Setup  UE Bathing: Stand by assistance  LE Bathing: Moderate assistance  UE Dressing: Stand by assistance  LE Dressing: Maximum assistance  LE Dressing Skilled Clinical Factors: A to don B socks this date  Toileting: Moderate assistance  Toileting Skilled Clinical Factors: pt able to complete clothing management while standing at [facility] with CGA; completes stalin care weight shifting on [facility]  Additional Comments: ADL scores based on skilled observation and clinical reasoning unless otherwise noted. Pt is currently limited by dizziness, balance, strength, and endurance impacting performance in self care tasks.          UE Function  LUE AROM (degrees)  LUE AROM : WFL  Left Hand AROM (degrees)  Left Hand AROM: WFL  Tone LUE  LUE Tone: Normotonic  LUE Strength  Gross LUE Strength: WFL  L Hand General: 4-/5  LUE Strength Comment: grossly 4-/5    RUE AROM (degrees)  RUE AROM : WFL  Right Hand AROM (degrees)  Right Hand AROM: Exceptions  Right Hand General AROM: pt only able to complete thumb opposition with index finger due to edema in hand  Tone RUE  RUE Tone: Normotonic  RUE Strength  Gross RUE Strength: WFL  R Hand General: 2+/5  RUE Strength Comment: shoulder, elbow, wrist grossly 4-/5    Hand Dominance  Hand Dominance: Right    Fine Motor Skills/Coordination  Coordination  Movements Are Fluid And Coordinated: Yes              Bed Mobility  Bed mobility  Supine to Sit: Stand by assistance  Sit to Supine: Stand by assistance  Scooting: Stand by assistance  Bed Mobility Comments: HOB partially elevated with use of bedrail    Balance  Balance  Sitting Balance: Stand by assistance (static unsupported sitting EOB ~4 minutes, no loss of balance.  SBA for safety due to dizziness)  Standing Balance: Contact guard assistance  Standing Balance  Time: 1 minute  Activity: functional transfers, functional mobility, toileting  Comment: with BUE support on RW    Transfers  Transfers  Sit to stand: Contact guard assistance  Stand to sit: Contact guard assistance  Transfer Comments: verbal cues for proper hand placement prior to transfers with Good carryover  Toilet Transfers  Toilet - Technique: Stand step  Equipment Used: Standard bedside commode  Toilet Transfer: Contact guard assistance  Toilet Transfers Comments: CGA for safe descent/ascent; pt reaches for GB without verbal cues    Functional Mobility  Functional - Mobility Device: Rolling Walker  Activity: Other (from EOB to [facility])  Assist Level: Contact guard assistance  Functional Mobility Comments: 2 person assistance this date for safety due to dizziness with positional changes    Assessment  Assessment  Performance deficits / Impairments: Decreased functional mobility , Decreased ADL status, Decreased strength, Decreased endurance, Decreased balance, Decreased high-level IADLs, Decreased safe awareness  Treatment Diagnosis: impaired self care status  Prognosis: Good  Decision Making: Medium Complexity  Discharge Recommendations: Patient would benefit from continued therapy after discharge    Activity Tolerance  Activity Tolerance: Patient limited by fatigue, Treatment limited secondary to medical complications (free text) (dizziness)    Safety Devices  Type of Devices: Nurse notified, Left in bed, Call light within reach (X-ray tech at bedside upon OT exit)    Patient Education  Patient Education  Education Given To: Patient  Education Provided: Role of Therapy, Plan of Care, Transfer Training  Education Method: Verbal  Barriers to Learning: None  Education Outcome: Verbalized understanding, Continued education needed      Functional Outcome Measures  AM-PAC Daily Activity - Inpatient   How much help is needed for putting on and taking off regular lower body clothing?: A Lot  How much help is needed for bathing (which includes washing, rinsing, drying)?: A Lot  How much help is needed for toileting (which includes using toilet, bedpan, or urinal)?: A Lot  How much help is needed for putting on and taking off regular upper body clothing?: A Little  How much help is needed for taking care of personal grooming?: A Little  How much help for eating meals?: A Little  [facility] Daily [facility] Raw Score: 15  AM-PAC Inpatient ADL T-Scale Score : 34.69  ADL Inpatient CMS 0-100% Score: 56.46  ADL Inpatient CMS G-Code Modifier : CK       Goals     Short Term Goals  Time Frame for Short Term Goals: By discharge, pt will  Short Term Goal 1: perform functional transfers/mobility during self care tasks with SBA, least restrictive device, and Good safety  Short Term Goal 2: be educated on and explore use of DME/AE to increase ease and independence during ADLs  Short Term Goal 3: perform lower body ADLs with CGA and adaptive equipment/techs as needed  Short Term Goal 4: tolerate standing and reaching for 6+ minutes with SBA and no LOB during functional activity  Short Term Goal 5: actively participate in 15+ minutes of therapeutic exercise/functional activity to increase overall strength/endurance needed for ADLs    Plan  Occupational Therapy Plan  Times Per Week: 4-6  Current Treatment Recommendations: Strengthening, Functional mobility training, Balance training, Endurance training, Pain management, Safety education & training, Equipment evaluation, education, & procurement, Patient/Caregiver education & training, Self-Care / ADL, Home management training      OT Individual Minutes  OT Individual Minutes  Time In: 4323  Time Out: 7660  Minutes: 24  Time Code Minutes   Timed Code Treatment Minutes: 10 Minutes        Electronically signed by ZAYDA Thomas on 2/13/23 at 11:13 AM EST

## 2023-02-13 NOTE — PROGRESS NOTES
Eulalia Mora. Stress Tech performs patient preparation of physical comfort, review test procedures, pre-stress EKG. Lung Sounds clear in all fields. Consent verified. Educated patient on test procedure and possible side effects of Lexiscan. Cardiologist reviewed pre-test EKG and is present for test. This RN reviewed troponin rates and advised Dr Jeri Mas of elevated Hx back to August of 2022.  approved continuing with test. Patient tolerated test well with minor SOB which resolved to baseline after test with caffeine. EKG portion of test complete, Nuc Med portion pending.   Pretest VS: /43 HR 57  Post test VS: /44 HR 59

## 2023-02-13 NOTE — PROGRESS NOTES
[facility]   Progress Note                   Date:   2/13/2023  Patient name: Cristhian Hanson  Date of admission:  2/11/2023  7:52 PM  MRN:   [id number]  YOB: 1960  PCP: AFSANEH Terry CNP    Reason for Admission:      Subjective:       Clinical Changes / Abnormalities: Patient denies any chest pain now but feels tired and weak lethargic  Patient had a stress test done result as below large anterior MI with stalin-infarct ischemia      Medications:   Scheduled Meds:   atorvastatin  40 mg Oral Nightly    aspirin  81 mg Oral Daily    docusate sodium  100 mg Oral BID    linaclotide  145 mcg Oral QAM AC    pantoprazole  40 mg Oral QAM AC    tamsulosin  0.4 mg Oral Daily    allopurinol  100 mg Oral Daily    sodium chloride flush  5-40 mL IntraVENous 2 times per day    heparin (porcine)  5,000 Units SubCUTAneous 3 times per day    gabapentin  300 mg Oral Daily    sodium bicarbonate  1,300 mg Oral BID    carvedilol  6.25 mg Oral BID    amLODIPine  5 mg Oral Daily    [Held by provider] insulin glargine  45 Units SubCUTAneous BID    insulin lispro  0-16 Units SubCUTAneous TID WC    insulin lispro  0-4 Units SubCUTAneous Nightly    cefTRIAXone (ROCEPHIN) IV  1,000 mg IntraVENous Q24H    bumetanide  2 mg Oral BID    dextromethorphan  30 mg Oral 2 times per day     Continuous Infusions:   sodium chloride      dextrose       CBC:   Recent Labs     02/11/23 2130 02/12/23  0607 02/13/23  0639   WBC 8.7 7.6 6.0   HGB 11.9* 10.6* 10.9*    197 182     BMP:    Recent Labs     02/11/23 2130 02/12/23  0607 02/13/23  0724    145* 143   K 3.5* 3.4* 3.7    101 101   CO2 34* 33* 32*   BUN 69* 65* 70*   CREATININE 3.04* 2.96* 3.12*   GLUCOSE 127* 93 212*     Hepatic: No results for input(s): AST, ALT, ALB, BILITOT, ALKPHOS in the last 72 hours. Troponin: No results for input(s): TROPONINI in the last 72 hours. BNP: No results for input(s): BNP in the last 72 hours.   Lipids: No results for input(s): CHOL, HDL in the last 72 hours. Invalid input(s): LDLCALCU  INR: No results for input(s): INR in the last 72 hours. Objective:   Vitals: BP (!) 136/51   Pulse 60   Temp 98.1 °F (36.7 °C)   Resp 20   Wt 248 lb (112.5 kg)   SpO2 92%   BMI 41.27 kg/m²   I/O last 3 completed shifts:  In: -   Out: 7314 [Urine:1402]  No intake/output data recorded.   Scheduled Meds:   atorvastatin  40 mg Oral Nightly    aspirin  81 mg Oral Daily    docusate sodium  100 mg Oral BID    linaclotide  145 mcg Oral QAM AC    pantoprazole  40 mg Oral QAM AC    tamsulosin  0.4 mg Oral Daily    allopurinol  100 mg Oral Daily    sodium chloride flush  5-40 mL IntraVENous 2 times per day    heparin (porcine)  5,000 Units SubCUTAneous 3 times per day    gabapentin  300 mg Oral Daily    sodium bicarbonate  1,300 mg Oral BID    carvedilol  6.25 mg Oral BID    amLODIPine  5 mg Oral Daily    [Held by provider] insulin glargine  45 Units SubCUTAneous BID    insulin lispro  0-16 Units SubCUTAneous TID WC    insulin lispro  0-4 Units SubCUTAneous Nightly    cefTRIAXone (ROCEPHIN) IV  1,000 mg IntraVENous Q24H    bumetanide  2 mg Oral BID    dextromethorphan  30 mg Oral 2 times per day     Continuous Infusions:   sodium chloride      dextrose       PRN Meds:.melatonin, sodium chloride flush, sodium chloride flush, sodium chloride, polyethylene glycol, acetaminophen **OR** acetaminophen, ondansetron **OR** ondansetron, glucose, dextrose bolus **OR** dextrose bolus, glucagon (rDNA), dextrose, benzonatate    CONSTITUTIONAL: AOx4, no apparent distress, appears stated age   HEAD: normocephalic, atraumatic   EYES: PERRLA, EOMI   ENT: moist mucous membranes, uvula midline   NECK: symmetric, no midline tenderness to palpation   LUNGS: clear to auscultation bilaterally   CARDIOVASCULAR: regular rate and rhythm, no murmurs, rubs or gallops   ABDOMEN: Soft, non-tender, non-distended with normal active bowel sounds   SKIN: no rash EKG: NSR non-specific T changes QTc 495 ms     ECHO: 1/27/23  Summary  Global left ventricular systolic function is normal.  Estimated LVEF 50-55%. Mild concentric left ventricular hypertrophy. Normal right ventricular size and function. No significant valvular regurgitation or stenosis seen. No pericardial effusion seen. Stress Test: 2/13/23  Impression       Large infarct in the lateral wall extending into the cardiac apex with some   stalin-infarct ischemia in anterolateral wall. LVEF normal.       Risk stratification: High risk patient has no prior history of evidence of   MI. Low risk if there is known prior history.                    Assessment / Acute Cardiac Problems:       Patient Active Problem List:     Atherosclerosis of coronary artery bypass graft of native heart without angina pectoris     Acute kidney injury superimposed on CKD ([facility] [address]     Acute on chronic diastolic heart failure ([facility])     Diabetic polyneuropathy associated with type 2 diabetes mellitus ([facility] [address]     History of coronary artery bypass graft     Iron deficiency anemia     Spinal stenosis of lumbar region with neurogenic claudication     Mixed hyperlipidemia     CKD (chronic kidney disease) stage 4, GFR 15-29 ml/min ([facility])     Type 2 diabetes 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, with long-term current use of insulin ([facility])     Obesity, Class II, BMI 35-39.9     Thyroid nodule greater than or equal to 1 cm in diameter incidentally noted on imaging study     Hypertension, essential     Chronic ischemic heart disease     Ischemic stroke of frontal lobe ([facility])     Morbid obesity with BMI of 40.0-44.9, adult ([facility])     Disequilibrium syndrome     Anxiety     Chronic midline low back pain with bilateral sciatica     COVID     Cerebral hypoperfusion     Immature arteriovenous fistula ([facility])     History of fusion of cervical spine     Depression with anxiety     Vancomycin resistant Enterococcus UTI     Dialysis disequilibrium syndrome     Acute cystitis without hematuria     VRE infection (vancomycin resistant Enterococcus)     Infection due to ESBL-producing Klebsiella pneumoniae     Class 2 severe obesity due to excess calories with serious comorbidity and body mass index (BMI) of 35.0 to 35.9 in adult [facility])     Type 2 diabetes mellitus with hyperglycemia, with long-term current use of insulin (HCC)     Right hemiparesis (HCC)     Ulcer of left foot, limited to breakdown of skin ([address]     Secondary hyperparathyroidism ([address]     Hypertensive urgency     Hypoxia     Congestive heart failure ([address]     Nephrotic range proteinuria     Acute respiratory failure with hypoxia (HCC)     Syncope and collapse     Subacute cough      Plan of Treatment:   Known CAD two-vessel bypass surgery in 2005, had Cardiolite stress test as above has large infarct stalin-infarct ischemia  At this time talked in detail regarding the heart cath as her CKD patient need to be cleared by nephrology  Hypertension much improved  Plan for heart cath once cleared by nephrology    Ramila Sal MD, MD  [facility]  [phone]

## 2023-02-13 NOTE — CARE COORDINATION
DISCHARGE PLANNING NOTE:    Plan is for patient to return to [facility] [place]. [facility]. Pre-cert will be started today. Active order for IV Rocephin. Will continue to follow for additional discharge needs.     Electronically signed by Chad Burns RN on 2/13/2023 at 9:12 AM

## 2023-02-13 NOTE — PLAN OF CARE
Problem: Discharge Planning  Goal: Discharge to home or other facility with appropriate resources  2/13/2023 0523 by Wing Taj RN  Outcome: Progressing  2/12/2023 1641 by Jocelyn Garcia RN  Outcome: Progressing     Problem: Pain  Goal: Verbalizes/displays adequate comfort level or baseline comfort level  2/13/2023 0523 by Wing Taj RN  Outcome: Progressing  2/12/2023 1641 by Jocelyn Garcia RN  Outcome: Progressing     Problem: Safety - Adult  Goal: Free from fall injury  2/13/2023 0523 by Wing Taj RN  Outcome: Progressing  2/12/2023 1641 by Jocelyn Garcia RN  Outcome: Progressing     Problem: ABCDS Injury Assessment  Goal: Absence of physical injury  2/13/2023 0523 by Wing Taj RN  Outcome: Progressing  2/12/2023 1641 by Jocelyn Garcia RN  Outcome: Progressing

## 2023-02-13 NOTE — PROGRESS NOTES
FAMILY MEDICINE  - PROGRESS NOTE    Date:  2/13/2023  Art Jumper  [id number]      Chief Complaint   Patient presents with    Loss of Consciousness         Interval History:  not changed, no significant events overnight. She is sleeping soundly this am. She is scheduled for a stress test this am. Specialists notes, labs & imaging reviewed.       Subjective  Constitutional: positive for obesity  Genitourinary:positive for dysuria and frequency  Neurological: positive for syncope  Endocrine: positive for diabetic symptoms including hyperglycemia :    Objective:    /63   Pulse 62   Temp 98.8 °F (37.1 °C)   Resp 17   Wt 248 lb (112.5 kg)   SpO2 92%   BMI 41.27 kg/m²   General appearance - overweight  Mental status - drowsy  Eyes - not examined  Ears - hearing grossly normal bilaterally  Nose - normal and patent, no erythema, discharge or polyps  Mouth - mucous membranes moist, pharynx normal without lesions  Neck - supple, no significant adenopathy  Lymphatics - no palpable lymphadenopathy, no hepatosplenomegaly  Chest - clear to auscultation, no wheezes, rales or rhonchi, symmetric air entry  Heart - normal rate, regular rhythm, normal S1, S2, no murmurs, rubs, clicks or gallops  Abdomen - soft, nontender, nondistended, no masses or organomegaly  Breasts - not examined  Back exam - not examined  Neurological - neck supple without rigidity  Musculoskeletal - no joint tenderness, deformity or swelling  Extremities - peripheral pulses normal, no pedal edema, no clubbing or cyanosis  Skin - normal coloration and turgor, no rashes, no suspicious skin lesions noted    Data:   Medications:   Current Facility-Administered Medications   Medication Dose Route Frequency Provider Last Rate Last Admin    melatonin tablet 9 mg  9 mg Oral Nightly PRN Sonam Harrell MD   9 mg at 02/13/23 0027    atorvastatin (LIPITOR) tablet 40 mg  40 mg Oral Nightly Priscila CHAVES Izabella Lucia MD   40 mg at 02/12/23 2112    aspirin chewable tablet 81 mg  81 mg Oral Daily Jessica Durán MD   81 mg at 02/12/23 0840    docusate sodium (COLACE) capsule 100 mg  100 mg Oral BID Jessica Durán MD   100 mg at 02/12/23 2112    linaclotide (LINZESS) capsule 145 mcg  145 mcg Oral QAM AC Jessica Durán MD   145 mcg at 02/12/23 0843    pantoprazole (PROTONIX) tablet 40 mg  40 mg Oral QAM AC Jessica Durán MD   40 mg at 02/12/23 0534    tamsulosin (FLOMAX) capsule 0.4 mg  0.4 mg Oral Daily Jessica Durán MD   0.4 mg at 02/12/23 0840    allopurinol (ZYLOPRIM) tablet 100 mg  100 mg Oral Daily Jessica Durán MD   100 mg at 02/12/23 0840    sodium chloride flush 0.9 % injection 5-40 mL  5-40 mL IntraVENous 2 times per day Jessica Durán MD   10 mL at 02/12/23 0844    sodium chloride flush 0.9 % injection 5-40 mL  5-40 mL IntraVENous PRN Jessica Durán MD        0.9 % sodium chloride infusion   IntraVENous PRN Jessica Durán MD        polyethylene glycol (GLYCOLAX) packet 17 g  17 g Oral Daily PRN Jessica Durán MD        acetaminophen (TYLENOL) tablet 650 mg  650 mg Oral Q6H PRN Jessica Durán MD   650 mg at 02/13/23 7317    Or    acetaminophen (TYLENOL) suppository 650 mg  650 mg Rectal Q6H PRN Jessica Durán MD        ondansetron (ZOFRAN-ODT) disintegrating tablet 4 mg  4 mg Oral Q8H PRN Jessica Durán MD        Or    ondansetron [facility]) injection 4 mg  4 mg IntraVENous Q6H PRN Jessica Durán MD        heparin (porcine) injection 5,000 Units  5,000 Units SubCUTAneous 3 times per day Jessica Durán MD   5,000 Units at 02/12/23 2113    glucose chewable tablet 16 g  4 tablet Oral PRN Jessica Durán MD        dextrose bolus 10% 125 mL  125 mL IntraVENous PRN Jessica Durán MD        Or    dextrose bolus 10% 250 mL  250 mL IntraVENous PRN Jessica Durán MD        glucagon (rDNA) injection 1 mg  1 mg SubCUTAneous PRN Jessica Durán MD        dextrose 10 % infusion   IntraVENous Continuous PRN Yasmin Xavier MD        gabapentin (NEURONTIN) capsule 300 mg  300 mg Oral Daily Yasmin Xavier MD   300 mg at 02/12/23 0840    sodium bicarbonate tablet 1,300 mg  1,300 mg Oral BID Yasmin Xavier MD   1,300 mg at 02/12/23 2112    carvedilol (COREG) tablet 6.25 mg  6.25 mg Oral BID Rustam Sterling, DO   6.25 mg at 02/12/23 2113    amLODIPine (NORVASC) tablet 5 mg  5 mg Oral Daily Rustam Sterling, DO   5 mg at 02/12/23 0840    [Held by provider] insulin glargine (LANTUS) injection vial 45 Units  45 Units SubCUTAneous BID Yasmin Xavier MD   45 Units at 02/12/23 2113    insulin lispro (HUMALOG) injection vial 0-16 Units  0-16 Units SubCUTAneous TID WC Yasmin Xavier MD   12 Units at 02/12/23 1800    insulin lispro (HUMALOG) injection vial 0-4 Units  0-4 Units SubCUTAneous Nightly Yasmin Xavier MD        benzonatate (TESSALON) capsule 100 mg  100 mg Oral TID PRN Yasmin Xavier MD   100 mg at 02/12/23 1910    cefTRIAXone (ROCEPHIN) 1,000 mg in sodium chloride 0.9 % 50 mL IVPB (mini-bag)  1,000 mg IntraVENous Q24H Yasmin Xavier MD        bumetanide (BUMEX) tablet 2 mg  2 mg Oral BID Yasmin Xvaier MD   2 mg at 02/12/23 2112    dextromethorphan (DELSYM) 30 MG/5ML extended release liquid 30 mg  30 mg Oral 2 times per day Yasmin Xavier MD   30 mg at 02/12/23 2113       Intake/Output Summary (Last 24 hours) at 2/13/2023 0611  Last data filed at 2/12/2023 1759  Gross per 24 hour   Intake --   Output 1402 ml   Net -1402 ml     Recent Results (from the past 24 hour(s))   POC Glucose Fingerstick    Collection Time: 02/12/23  6:47 AM   Result Value Ref Range    POC Glucose 104 65 - 105 mg/dL   POC Glucose Fingerstick    Collection Time: 02/12/23 11:21 AM   Result Value Ref Range    POC Glucose 322 (H) 65 - 105 mg/dL   Troponin    Collection Time: 02/12/23 11:47 AM   Result Value Ref Range    Troponin, High Sensitivity 90 (HH) 0 - 14 ng/L   POC Glucose Fingerstick    Collection Time: 02/12/23  4:17 PM Result Value Ref Range    POC Glucose 303 (H) 65 - 105 mg/dL   POC Glucose Fingerstick    Collection Time: 02/12/23  7:55 PM   Result Value Ref Range    POC Glucose 253 (H) 65 - 105 mg/dL     -----------------------------------------------------------------  RAD:  EKG:  Micro:     Assessment & Plan:    Patient Active Problem List:     Atherosclerosis of coronary artery bypass graft of native heart without angina pectoris     Acute kidney injury superimposed on CKD ([facility])     Acute on chronic diastolic heart failure ([facility])     Diabetic polyneuropathy associated with type 2 diabetes mellitus ([facility] [address]     History of coronary artery bypass graft     Iron deficiency anemia     Spinal stenosis of lumbar region with neurogenic claudication     Mixed hyperlipidemia     CKD (chronic kidney disease) stage 4, GFR 15-29 ml/min ([facility])     Type 2 diabetes 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, with long-term current use of insulin ([facility])     Obesity, Class II, BMI 35-39.9     Thyroid nodule greater than or equal to 1 cm in diameter incidentally noted on imaging study     Hypertension, essential     Chronic ischemic heart disease     Ischemic stroke of frontal lobe ([facility])     Morbid obesity with BMI of 40.0-44.9, adult ([facility])     Disequilibrium syndrome     Anxiety     Chronic midline low back pain with bilateral sciatica     COVID     Cerebral hypoperfusion     Immature arteriovenous fistula ([facility])     History of fusion of cervical spine     Depression with anxiety     Vancomycin resistant Enterococcus UTI     Dialysis disequilibrium syndrome     Acute cystitis without hematuria     VRE infection (vancomycin resistant Enterococcus)     Infection due to ESBL-producing Klebsiella pneumoniae     Class 2 severe obesity due to excess calories with serious comorbidity and body mass index (BMI) of 35.0 to 35.9 in adult [facility])     Type 2 diabetes mellitus with hyperglycemia, with long-term current use of insulin ([facility])     Right hemiparesis ([facility] [address]     Ulcer of left foot, limited to breakdown of skin ([address]     Secondary hyperparathyroidism ([facility] [address]     Hypertensive urgency     Hypoxia     Congestive heart failure ([facility] [address]     Nephrotic range proteinuria     Acute respiratory failure with hypoxia ([facility])     Syncope and collapse     Subacute cough           Plan:  -Syncope and collapse - Cardiology consulted, stress test scheduled for this am.  -UTI - on IV Rocephin. -Justina.Crooks - Nephrology consulted. -Disequilibrium syndrome - Neurology consulted. -Type 2 DM - stable on Lantus and SS coverage.  -Continue current treatments. -D/C planning ongoing.  -Complete orders per chart.     See orders   Disposition:    Electronically signed by Ariel Rebolledo MD on 2/13/2023 at 6:11 AM

## 2023-02-13 NOTE — PROGRESS NOTES
NEPHROLOGY PROGRESS NOTE    Reason for consultation: Management of chronic kidney disease stage IV. Consulting physician: Anuj Ballard MD.    Interval history: Patient was seen and examined today and she complains of insomnia and dizziness. She does not have nausea or vomiting. She had previously been on in center hemodialysis from February 2022 to August 2022 and has been relatively stable since then until recent admission to [facility] for volume overload. History of Present Illness: This is a 57 y.o. female with history of hypertension, longstanding diabetes mellitus with diabetic nephropathy, Coronary artery disease status post CABG x 3  Chronic CHF with diastolic dysfunction, who presents the [facility] with syncopal episode. Patient has underlying CKD stage IV secondary to diabetic nephropathy. and previous history of dialysis dependency which was  discontinued Aug 2022 due to regaining residual kidney function. She recently was discharged from [facility] on 2/8/2023  after presenting with weight gain and shortness of breath, acute hypoxic respiratory failure secondary to decompensated acute on chronic diastolic heart failure and Hypertensive urgency. Patient was optimally diuresed and  was discharged to [address] [place] [address] on a regimen of Bumex 3 mg twice daily and Metolazone 5 mg every other day. She reports passing out at the [facility] while standing up and attempting to walk across her room yesterday. She has recent onset of lightheadedness 4 days prior . She was  hypotensive and reports two of her  blood pressure medications were held. She  was found to have a UTI isolating  Enterococcus faecalis on 2/7/2022 sensitive to Cipro and commenced on treatment with ciprofloxacin. She denies any flank pain. She has been experiencing dysuria and urgency. No fever, nausea vomiting . She has mild decrease in oral intake.  Serum creatinine had risen to 3.3 mg/dl on 2/7/2023 Tolerated infusion well today Discharge instructions given Verbally acknowledged understanding.  Ambulated to private vehicle without difficulty  NAD   and on presentation to [facility] on 2/11/2023 creatinine was 3.04 mg/dL. Creatinine today is 2.96 mg/dL with BUN of 65, potassium 3.4 and sodium of 145. She has normal white count and hemoglobin of 10.6  She was previously on dialysis initiated in August 2021 following episode of ATN after contrast exposure. She had a brief period of transient recovery of renal function between February and April 2022 Following which dialysis was restarted due to volume overload resistant to diuretic treatment. 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 She was given a trial off dialysis in August 2022 and has not required dialysis since then. Volume overload has been managed with diuretics. Patient's current baseline creatinine has ranged between  2.6 to 3.0 mg/dL. She had previously followed up with Dr. Annabella Russell and currently receives nephrological care with [facility].     Current Medications:    melatonin tablet 9 mg, Nightly PRN  sodium chloride flush 0.9 % injection 5-40 mL, PRN  0.9 % sodium chloride infusion, Continuous PRN  albuterol sulfate HFA (PROVENTIL;VENTOLIN;PROAIR) 108 (90 Base) MCG/ACT inhaler 2 puff, PRN  atropine injection 0.5 mg, Q5 Min PRN  nitroGLYCERIN (NITROSTAT) SL tablet 0.4 mg, Q5 Min PRN  metoprolol (LOPRESSOR) injection 5 mg, Q5 Min PRN  aminophylline injection 50 mg, PRN  atorvastatin (LIPITOR) tablet 40 mg, Nightly  aspirin chewable tablet 81 mg, Daily  docusate sodium (COLACE) capsule 100 mg, BID  linaclotide (LINZESS) capsule 145 mcg, QAM AC  pantoprazole (PROTONIX) tablet 40 mg, QAM AC  tamsulosin (FLOMAX) capsule 0.4 mg, Daily  allopurinol (ZYLOPRIM) tablet 100 mg, Daily  sodium chloride flush 0.9 % injection 5-40 mL, 2 times per day  sodium chloride flush 0.9 % injection 5-40 mL, PRN  0.9 % sodium chloride infusion, PRN  polyethylene glycol (GLYCOLAX) packet 17 g, Daily PRN  acetaminophen (TYLENOL) tablet 650 mg, Q6H PRN   Or  acetaminophen (TYLENOL) suppository 650 mg, Q6H PRN  ondansetron (ZOFRAN-ODT) disintegrating tablet 4 mg, Q8H PRN   Or  ondansetron (ZOFRAN) injection 4 mg, Q6H PRN  heparin (porcine) injection 5,000 Units, 3 times per day  glucose chewable tablet 16 g, PRN  dextrose bolus 10% 125 mL, PRN   Or  dextrose bolus 10% 250 mL, PRN  glucagon (rDNA) injection 1 mg, PRN  dextrose 10 % infusion, Continuous PRN  gabapentin (NEURONTIN) capsule 300 mg, Daily  sodium bicarbonate tablet 1,300 mg, BID  carvedilol (COREG) tablet 6.25 mg, BID  amLODIPine (NORVASC) tablet 5 mg, Daily  [Held by provider] insulin glargine (LANTUS) injection vial 45 Units, BID  insulin lispro (HUMALOG) injection vial 0-16 Units, TID WC  insulin lispro (HUMALOG) injection vial 0-4 Units, Nightly  benzonatate (TESSALON) capsule 100 mg, TID PRN  cefTRIAXone (ROCEPHIN) 1,000 mg in sodium chloride 0.9 % 50 mL IVPB (mini-bag), Q24H  bumetanide (BUMEX) tablet 2 mg, BID  dextromethorphan (DELSYM) 30 MG/5ML extended release liquid 30 mg, 2 times per day    Objective:  CURRENT TEMPERATURE:  Temp: 98.1 °F (36.7 °C)  MAXIMUM TEMPERATURE OVER 24HRS:  Temp (24hrs), Av.4 °F (36.9 °C), Min:98.1 °F (36.7 °C), Max:98.8 °F (37.1 °C)    CURRENT RESPIRATORY RATE:  Resp: 20  CURRENT PULSE:  Heart Rate: 60  CURRENT BLOOD PRESSURE:  BP: (!) 136/51  24HR BLOOD PRESSURE RANGE:  Systolic (59KTA), EWM:856 , Min:131 , OPU:116   ; Diastolic (85IAZ), VKH:01, Min:51, Max:77    24HR INTAKE/OUTPUT:    Intake/Output Summary (Last 24 hours) at 2023 1255  Last data filed at 2023 1759  Gross per 24 hour   Intake --   Output 902 ml   Net -902 ml       Patient Vitals for the past 96 hrs (Last 3 readings):   Weight   23 248 lb (112.5 kg)       Physical Exam:  GENERAL APPEARANCE: Alert and cooperative, and appears to be in no acute distress. HEAD: normocephalic  EYES: PERRL, EOMI. Not pale, anicteric   NOSE:  No nasal discharge.     THROAT: Oral cavity and pharynx normal. Moist  NECK: Neck supple, non-tender without lymphadenopathy, masses or thyromegaly. JVD-neg  CARDIAC: Normal S1 and S2. No S3, S4 or murmurs. Rhythm is regular. LUNGS: Clear to auscultation and percussion without rales, rhonchi, wheezing or diminished breath sounds. ABDOMEN: soft non distended, BS+ Non tender no organomegally  MUSKULOSKELETAL: Adequately aligned spine. No joint erythema or tenderness. EXTREMITIES: No edema. Peripheral pulses intact.    NEURO:Alert oriented x 3 ,power 5/5 in all extremities    Labs:   CBC:  Recent Labs     02/11/23 2130 02/12/23  0607 02/13/23  0639   WBC 8.7 7.6 6.0   RBC 3.80* 3.38* 3.46*   HGB 11.9* 10.6* 10.9*   HCT 35.8* 31.9* 32.7*   MCV 94.3 94.4 94.7   MCH 31.3 31.4 31.4   MCHC 33.2 33.3 33.2   RDW 15.2* 15.0* 15.1*    197 182   MPV 8.9 9.0 8.9        BMP:   Recent Labs     02/11/23 2130 02/12/23  0607 02/13/23  0724    145* 143   K 3.5* 3.4* 3.7    101 101   CO2 34* 33* 32*   BUN 69* 65* 70*   CREATININE 3.04* 2.96* 3.12*   GLUCOSE 127* 93 212*   CALCIUM 9.0 8.8 8.8        Magnesium:   Recent Labs     02/12/23  0607   MG 2.2       IRON:    Lab Results   Component Value Date/Time    IRON 83 09/27/2022 01:32 PM     TIBC:    Lab Results   Component Value Date/Time    TIBC 217 09/27/2022 01:32 PM     FERRITIN:    Lab Results   Component Value Date/Time    FERRITIN 117 09/27/2022 01:32 PM     SPEP:   Lab Results   Component Value Date/Time    PROT 6.4 02/07/2023 05:45 AM     UPEP:   Lab Results   Component Value Date/Time    TPU 20 11/12/2021 10:30 AM      Urine Sodium:    Lab Results   Component Value Date/Time    JASON 47 11/14/2021 02:04 AM      Urine Creatinine:    Lab Results   Component Value Date/Time    LABCREA 65.4 01/26/2023 01:10 PM     Urine Protein:    Lab Results   Component Value Date/Time    TPU 20 11/12/2021 10:30 AM     Urinalysis:  U/A:   Lab Results   Component Value Date/Time    NITRU NEGATIVE 02/07/2023 03:00 PM    COLORU Yellow 02/07/2023 03:00 PM    PHUR 5.5 02/07/2023 03:00 PM    WBCUA 2 TO 5 02/07/2023 03:00 PM    RBCUA None 02/07/2023 03:00 PM    MUCUS 1+ 02/07/2023 03:00 PM    TRICHOMONAS NOT REPORTED 11/14/2021 02:05 AM    YEAST FEW 02/07/2023 03:00 PM    BACTERIA MANY 02/07/2023 03:00 PM    SPECGRAV 1.016 02/07/2023 03:00 PM    LEUKOCYTESUR SMALL 02/07/2023 03:00 PM    UROBILINOGEN Normal 02/07/2023 03:00 PM    BILIRUBINUR NEGATIVE 02/07/2023 03:00 PM    GLUCOSEU NEGATIVE 02/07/2023 03:00 PM    KETUA NEGATIVE 02/07/2023 03:00 PM    AMORPHOUS 2+ 07/31/2022 12:00 PM     Assessment/plan:    1. Chronic kidney disease stage IV [Baseline serum creatinine 2.6 to 3.0 mg/dL over the past few months] - secondary to diabetic nephropathy. He is nonoliguric with BUN/creatinine 70/3.12 mg/dL today [estimated GFR 16 mL/min]. There are no indications for reinitiation of dialysis. 2.  Systemic hypertension - blood pressure is adequately controlled. 3.  Syncope associated with orthostasis. She is currently on Bumex 2 mg twice daily and is clinically not in pulmonary edema. We will decrease to 1 mg p.o. twice daily and monitor weight and urine output closely. Lexiscan cardiac stress test ordered by cardiology. 4.  Coronary artery disease s/p CABG - Cardiac catheterization in 2019 showed patent grafts. 5.  Enterococcus faecalis urinary tract infection - on IV ceftriaxone. Prognosis is guarded.     Electronically signed by Radha Gonzalez MD on 2/13/2023 at 12:55 PM

## 2023-02-14 ENCOUNTER — HOSPITAL ENCOUNTER (OUTPATIENT)
Dept: CARDIAC CATH/INVASIVE PROCEDURES | Age: 63
Discharge: HOME | End: 2023-02-14
Payer: COMMERCIAL

## 2023-02-14 VITALS
SYSTOLIC BLOOD PRESSURE: 126 MMHG | RESPIRATION RATE: 17 BRPM | OXYGEN SATURATION: 96 % | DIASTOLIC BLOOD PRESSURE: 48 MMHG | HEART RATE: 57 BPM

## 2023-02-14 LAB
ABSOLUTE EOS #: 0.3 K/UL (ref 0–0.4)
ABSOLUTE LYMPH #: 0.9 K/UL (ref 1–4.8)
ABSOLUTE MONO #: 0.4 K/UL (ref 0.1–1.3)
ANION GAP SERPL CALCULATED.3IONS-SCNC: 13 MMOL/L (ref 9–17)
BASOPHILS # BLD: 1 % (ref 0–2)
BASOPHILS ABSOLUTE: 0 K/UL (ref 0–0.2)
BUN SERPL-MCNC: 70 MG/DL (ref 8–23)
CALCIUM SERPL-MCNC: 8.9 MG/DL (ref 8.6–10.4)
CHLORIDE SERPL-SCNC: 98 MMOL/L (ref 98–107)
CO2 SERPL-SCNC: 31 MMOL/L (ref 20–31)
CREAT SERPL-MCNC: 3.01 MG/DL (ref 0.5–0.9)
EOSINOPHILS RELATIVE PERCENT: 6 % (ref 0–4)
GFR SERPL CREATININE-BSD FRML MDRD: 17 ML/MIN/1.73M2
GLUCOSE BLD-MCNC: 262 MG/DL (ref 65–105)
GLUCOSE BLD-MCNC: 267 MG/DL (ref 65–105)
GLUCOSE BLD-MCNC: 295 MG/DL (ref 65–105)
GLUCOSE BLD-MCNC: 355 MG/DL (ref 65–105)
GLUCOSE SERPL-MCNC: 269 MG/DL (ref 70–99)
HCT VFR BLD AUTO: 34.5 % (ref 36–46)
HGB BLD-MCNC: 11.3 G/DL (ref 12–16)
LYMPHOCYTES # BLD: 17 % (ref 24–44)
MCH RBC QN AUTO: 31.1 PG (ref 26–34)
MCHC RBC AUTO-ENTMCNC: 32.9 G/DL (ref 31–37)
MCV RBC AUTO: 94.7 FL (ref 80–100)
MICROORGANISM SPEC CULT: NORMAL
MONOCYTES # BLD: 8 % (ref 1–7)
PDW BLD-RTO: 15 % (ref 11.5–14.9)
PLATELET # BLD AUTO: 174 K/UL (ref 150–450)
PMV BLD AUTO: 9 FL (ref 6–12)
POTASSIUM SERPL-SCNC: 3.8 MMOL/L (ref 3.7–5.3)
RBC # BLD: 3.64 M/UL (ref 4–5.2)
SEG NEUTROPHILS: 68 % (ref 36–66)
SEGMENTED NEUTROPHILS ABSOLUTE COUNT: 3.5 K/UL (ref 1.3–9.1)
SODIUM SERPL-SCNC: 142 MMOL/L (ref 135–144)
SPECIMEN DESCRIPTION: NORMAL
WBC # BLD AUTO: 5.1 K/UL (ref 3.5–11)

## 2023-02-14 PROCEDURE — 6360000002 HC RX W HCPCS: Performed by: STUDENT IN AN ORGANIZED HEALTH CARE EDUCATION/TRAINING PROGRAM

## 2023-02-14 PROCEDURE — 36415 COLL VENOUS BLD VENIPUNCTURE: CPT

## 2023-02-14 PROCEDURE — 99232 SBSQ HOSP IP/OBS MODERATE 35: CPT | Performed by: FAMILY MEDICINE

## 2023-02-14 PROCEDURE — C1874 STENT, COATED/COV W/DEL SYS: HCPCS

## 2023-02-14 PROCEDURE — C1725 CATH, TRANSLUMIN NON-LASER: HCPCS

## 2023-02-14 PROCEDURE — 6370000000 HC RX 637 (ALT 250 FOR IP)

## 2023-02-14 PROCEDURE — 97535 SELF CARE MNGMENT TRAINING: CPT

## 2023-02-14 PROCEDURE — 80048 BASIC METABOLIC PNL TOTAL CA: CPT

## 2023-02-14 PROCEDURE — 2580000003 HC RX 258: Performed by: STUDENT IN AN ORGANIZED HEALTH CARE EDUCATION/TRAINING PROGRAM

## 2023-02-14 PROCEDURE — 6370000000 HC RX 637 (ALT 250 FOR IP): Performed by: INTERNAL MEDICINE

## 2023-02-14 PROCEDURE — 4A023N8 MEASUREMENT OF CARDIAC SAMPLING AND PRESSURE, BILATERAL, PERCUTANEOUS APPROACH: ICD-10-PCS | Performed by: INTERNAL MEDICINE

## 2023-02-14 PROCEDURE — 027034Z DILATION OF CORONARY ARTERY, ONE ARTERY WITH DRUG-ELUTING INTRALUMINAL DEVICE, PERCUTANEOUS APPROACH: ICD-10-PCS | Performed by: INTERNAL MEDICINE

## 2023-02-14 PROCEDURE — C1760 CLOSURE DEV, VASC: HCPCS

## 2023-02-14 PROCEDURE — B2111ZZ FLUOROSCOPY OF MULTIPLE CORONARY ARTERIES USING LOW OSMOLAR CONTRAST: ICD-10-PCS | Performed by: INTERNAL MEDICINE

## 2023-02-14 PROCEDURE — 7100000011 HC PHASE II RECOVERY - ADDTL 15 MIN

## 2023-02-14 PROCEDURE — C1887 CATHETER, GUIDING: HCPCS

## 2023-02-14 PROCEDURE — 6370000000 HC RX 637 (ALT 250 FOR IP): Performed by: STUDENT IN AN ORGANIZED HEALTH CARE EDUCATION/TRAINING PROGRAM

## 2023-02-14 PROCEDURE — 6360000004 HC RX CONTRAST MEDICATION

## 2023-02-14 PROCEDURE — 82947 ASSAY GLUCOSE BLOOD QUANT: CPT

## 2023-02-14 PROCEDURE — 93455 CORONARY ART/GRFT ANGIO S&I: CPT

## 2023-02-14 PROCEDURE — B2151ZZ FLUOROSCOPY OF LEFT HEART USING LOW OSMOLAR CONTRAST: ICD-10-PCS | Performed by: INTERNAL MEDICINE

## 2023-02-14 PROCEDURE — 85025 COMPLETE CBC W/AUTO DIFF WBC: CPT

## 2023-02-14 PROCEDURE — 7100000010 HC PHASE II RECOVERY - FIRST 15 MIN

## 2023-02-14 PROCEDURE — 97530 THERAPEUTIC ACTIVITIES: CPT

## 2023-02-14 PROCEDURE — 2580000003 HC RX 258: Performed by: INTERNAL MEDICINE

## 2023-02-14 PROCEDURE — 92928 PRQ TCAT PLMT NTRAC ST 1 LES: CPT

## 2023-02-14 PROCEDURE — C1894 INTRO/SHEATH, NON-LASER: HCPCS

## 2023-02-14 PROCEDURE — 2709999900 HC NON-CHARGEABLE SUPPLY

## 2023-02-14 PROCEDURE — 1200000000 HC SEMI PRIVATE

## 2023-02-14 PROCEDURE — 6360000002 HC RX W HCPCS

## 2023-02-14 PROCEDURE — C1769 GUIDE WIRE: HCPCS

## 2023-02-14 RX ORDER — ACETAMINOPHEN 325 MG/1
650 TABLET ORAL EVERY 4 HOURS PRN
Status: DISCONTINUED | OUTPATIENT
Start: 2023-02-14 | End: 2023-02-15 | Stop reason: HOSPADM

## 2023-02-14 RX ORDER — SODIUM CHLORIDE 9 MG/ML
INJECTION, SOLUTION INTRAVENOUS CONTINUOUS
Status: DISCONTINUED | OUTPATIENT
Start: 2023-02-14 | End: 2023-02-15 | Stop reason: HOSPADM

## 2023-02-14 RX ORDER — SODIUM CHLORIDE 0.9 % (FLUSH) 0.9 %
5-40 SYRINGE (ML) INJECTION PRN
Status: DISCONTINUED | OUTPATIENT
Start: 2023-02-14 | End: 2023-02-15 | Stop reason: HOSPADM

## 2023-02-14 RX ORDER — SODIUM CHLORIDE 0.9 % (FLUSH) 0.9 %
5-40 SYRINGE (ML) INJECTION EVERY 12 HOURS SCHEDULED
Status: DISCONTINUED | OUTPATIENT
Start: 2023-02-14 | End: 2023-02-15 | Stop reason: HOSPADM

## 2023-02-14 RX ORDER — DEXTROMETHORPHAN POLISTIREX 30 MG/5ML
60 SUSPENSION ORAL EVERY 12 HOURS SCHEDULED
Status: DISCONTINUED | OUTPATIENT
Start: 2023-02-15 | End: 2023-02-17 | Stop reason: HOSPADM

## 2023-02-14 RX ORDER — SODIUM CHLORIDE 9 MG/ML
INJECTION, SOLUTION INTRAVENOUS CONTINUOUS
Status: ACTIVE | OUTPATIENT
Start: 2023-02-14 | End: 2023-02-15

## 2023-02-14 RX ORDER — ATORVASTATIN CALCIUM 80 MG/1
80 TABLET, FILM COATED ORAL NIGHTLY
Qty: 30 TABLET | Refills: 4 | Status: SHIPPED | OUTPATIENT
Start: 2023-02-14 | End: 2023-05-15

## 2023-02-14 RX ORDER — SODIUM CHLORIDE 9 MG/ML
INJECTION, SOLUTION INTRAVENOUS PRN
Status: DISCONTINUED | OUTPATIENT
Start: 2023-02-14 | End: 2023-02-15 | Stop reason: HOSPADM

## 2023-02-14 RX ORDER — ATORVASTATIN CALCIUM 80 MG/1
80 TABLET, FILM COATED ORAL NIGHTLY
Status: DISCONTINUED | OUTPATIENT
Start: 2023-02-14 | End: 2023-02-15 | Stop reason: HOSPADM

## 2023-02-14 RX ORDER — LABETALOL HYDROCHLORIDE 5 MG/ML
10 INJECTION, SOLUTION INTRAVENOUS EVERY 4 HOURS PRN
Status: DISCONTINUED | OUTPATIENT
Start: 2023-02-14 | End: 2023-02-15 | Stop reason: HOSPADM

## 2023-02-14 RX ORDER — ATORVASTATIN CALCIUM 80 MG/1
80 TABLET, FILM COATED ORAL NIGHTLY
Status: DISCONTINUED | OUTPATIENT
Start: 2023-02-14 | End: 2023-02-17 | Stop reason: HOSPADM

## 2023-02-14 RX ADMIN — GABAPENTIN 300 MG: 300 CAPSULE ORAL at 09:14

## 2023-02-14 RX ADMIN — SODIUM CHLORIDE, PRESERVATIVE FREE 10 ML: 5 INJECTION INTRAVENOUS at 09:15

## 2023-02-14 RX ADMIN — SODIUM CHLORIDE: 9 INJECTION, SOLUTION INTRAVENOUS at 16:57

## 2023-02-14 RX ADMIN — Medication 9 MG: at 21:52

## 2023-02-14 RX ADMIN — INSULIN LISPRO 8 UNITS: 100 INJECTION, SOLUTION INTRAVENOUS; SUBCUTANEOUS at 09:17

## 2023-02-14 RX ADMIN — INSULIN LISPRO 4 UNITS: 100 INJECTION, SOLUTION INTRAVENOUS; SUBCUTANEOUS at 21:53

## 2023-02-14 RX ADMIN — HEPARIN SODIUM 5000 UNITS: 5000 INJECTION INTRAVENOUS; SUBCUTANEOUS at 21:52

## 2023-02-14 RX ADMIN — Medication 30 MG: at 09:14

## 2023-02-14 RX ADMIN — INSULIN GLARGINE 45 UNITS: 100 INJECTION, SOLUTION SUBCUTANEOUS at 21:53

## 2023-02-14 RX ADMIN — DOCUSATE SODIUM 100 MG: 100 CAPSULE, LIQUID FILLED ORAL at 09:14

## 2023-02-14 RX ADMIN — BENZONATATE 100 MG: 100 CAPSULE ORAL at 05:04

## 2023-02-14 RX ADMIN — SODIUM CHLORIDE, PRESERVATIVE FREE 10 ML: 5 INJECTION INTRAVENOUS at 19:55

## 2023-02-14 RX ADMIN — Medication 30 MG: at 19:55

## 2023-02-14 RX ADMIN — ATORVASTATIN CALCIUM 80 MG: 80 TABLET, FILM COATED ORAL at 21:52

## 2023-02-14 RX ADMIN — HEPARIN SODIUM 5000 UNITS: 5000 INJECTION INTRAVENOUS; SUBCUTANEOUS at 05:04

## 2023-02-14 RX ADMIN — ACETAMINOPHEN 650 MG: 325 TABLET ORAL at 16:48

## 2023-02-14 RX ADMIN — SODIUM BICARBONATE 1300 MG: 650 TABLET ORAL at 19:55

## 2023-02-14 RX ADMIN — TICAGRELOR 90 MG: 90 TABLET ORAL at 21:52

## 2023-02-14 RX ADMIN — CARVEDILOL 6.25 MG: 6.25 TABLET, FILM COATED ORAL at 19:55

## 2023-02-14 RX ADMIN — PANTOPRAZOLE SODIUM 40 MG: 40 TABLET, DELAYED RELEASE ORAL at 05:04

## 2023-02-14 RX ADMIN — AMLODIPINE BESYLATE 5 MG: 5 TABLET ORAL at 09:14

## 2023-02-14 RX ADMIN — BENZONATATE 100 MG: 100 CAPSULE ORAL at 19:55

## 2023-02-14 RX ADMIN — ASPIRIN 81 MG: 81 TABLET, CHEWABLE ORAL at 09:14

## 2023-02-14 RX ADMIN — SODIUM BICARBONATE 1300 MG: 650 TABLET ORAL at 09:14

## 2023-02-14 RX ADMIN — ALLOPURINOL 100 MG: 100 TABLET ORAL at 09:14

## 2023-02-14 RX ADMIN — CEFTRIAXONE SODIUM 1000 MG: 1 INJECTION, POWDER, FOR SOLUTION INTRAMUSCULAR; INTRAVENOUS at 17:17

## 2023-02-14 RX ADMIN — DOCUSATE SODIUM 100 MG: 100 CAPSULE, LIQUID FILLED ORAL at 19:55

## 2023-02-14 RX ADMIN — TAMSULOSIN HYDROCHLORIDE 0.4 MG: 0.4 CAPSULE ORAL at 09:14

## 2023-02-14 RX ADMIN — CARVEDILOL 6.25 MG: 6.25 TABLET, FILM COATED ORAL at 09:14

## 2023-02-14 RX ADMIN — INSULIN LISPRO 8 UNITS: 100 INJECTION, SOLUTION INTRAVENOUS; SUBCUTANEOUS at 16:50

## 2023-02-14 RX ADMIN — LINACLOTIDE 145 MCG: 145 CAPSULE, GELATIN COATED ORAL at 05:04

## 2023-02-14 ASSESSMENT — PAIN SCALES - GENERAL
PAINLEVEL_OUTOF10: 5
PAINLEVEL_OUTOF10: 2
PAINLEVEL_OUTOF10: 0

## 2023-02-14 ASSESSMENT — PAIN DESCRIPTION - ORIENTATION: ORIENTATION: POSTERIOR

## 2023-02-14 ASSESSMENT — PAIN DESCRIPTION - LOCATION: LOCATION: BUTTOCKS

## 2023-02-14 NOTE — PROGRESS NOTES
Pt. Alert and active still NPO will be getting Cardiac Cath today Pt. Spoke with provider understands risk to kidneys from cath knows Dialysis will have to start again Pt. States she wants to be well mouth is dry but no pain.

## 2023-02-14 NOTE — PROGRESS NOTES
Physical Therapy        Physical Therapy Cancel Note      DATE: 2023    NAME: Nancy Roman  MRN: [id number]   : 1960      Patient not seen this date for Physical Therapy due to:    Surgery/Procedure: Per nursing, pt is not available, at cath lab. Will continue to follow pt for PT needs.       Electronically signed by Thanh Maire PTA on 2023 at 11:53 AM

## 2023-02-14 NOTE — PROGRESS NOTES
FAMILY MEDICINE  - PROGRESS NOTE    Date:  2/14/2023  Yaneli Rowell  [id number]      Chief Complaint   Patient presents with    Loss of Consciousness         Interval History:  not changed, no significant events overnight. She is denying dizziness this am. She is scheduled for a cardiac cath this am. Specialists notes, labs & imaging reviewed.       Subjective  Constitutional: positive for obesity  Genitourinary:positive for dysuria and frequency  Neurological: positive for syncope  Endocrine: positive for diabetic symptoms including hyperglycemia :    Objective:    BP (!) 132/54   Pulse 55   Temp 98.1 °F (36.7 °C)   Resp 18   Wt 248 lb (112.5 kg)   SpO2 94%   BMI 41.27 kg/m²   General appearance - overweight  Mental status - drowsy  Eyes - not examined  Ears - hearing grossly normal bilaterally  Nose - normal and patent, no erythema, discharge or polyps  Mouth - mucous membranes moist, pharynx normal without lesions  Neck - supple, no significant adenopathy  Lymphatics - no palpable lymphadenopathy, no hepatosplenomegaly  Chest - clear to auscultation, no wheezes, rales or rhonchi, symmetric air entry  Heart - normal rate, regular rhythm, normal S1, S2, no murmurs, rubs, clicks or gallops  Abdomen - soft, nontender, nondistended, no masses or organomegaly  Breasts - not examined  Back exam - not examined  Neurological - neck supple without rigidity  Musculoskeletal - no joint tenderness, deformity or swelling  Extremities - peripheral pulses normal, no pedal edema, no clubbing or cyanosis  Skin - normal coloration and turgor, no rashes, no suspicious skin lesions noted    Data:   Medications:   Current Facility-Administered Medications   Medication Dose Route Frequency Provider Last Rate Last Admin    melatonin tablet 9 mg  9 mg Oral Nightly PRN Mariana Lyon MD   9 mg at 02/13/23 2033    sodium chloride flush 0.9 % injection 10 mL  10 mL IntraVENous PRN Rustam Katz DO   10 mL at 02/13/23 1318    atorvastatin (LIPITOR) tablet 40 mg  40 mg Oral Nightly Yasmin Xavier MD   40 mg at 02/13/23 2035    aspirin chewable tablet 81 mg  81 mg Oral Daily Yasmin Xavier MD   81 mg at 02/13/23 0925    docusate sodium (COLACE) capsule 100 mg  100 mg Oral BID Yasmin Xavier MD   100 mg at 02/13/23 2034    linaclotide (LINZESS) capsule 145 mcg  145 mcg Oral QAM AC Yasmin Xavier MD   145 mcg at 02/14/23 0504    pantoprazole (PROTONIX) tablet 40 mg  40 mg Oral QAM AC Yasmin Xavier MD   40 mg at 02/14/23 0504    tamsulosin (FLOMAX) capsule 0.4 mg  0.4 mg Oral Daily Yasmin Xavier MD   0.4 mg at 02/13/23 0925    allopurinol (ZYLOPRIM) tablet 100 mg  100 mg Oral Daily Yasmin Xavier MD   100 mg at 02/13/23 3471    sodium chloride flush 0.9 % injection 5-40 mL  5-40 mL IntraVENous 2 times per day Yasmin Xavier MD   10 mL at 02/13/23 2034    sodium chloride flush 0.9 % injection 5-40 mL  5-40 mL IntraVENous PRN Yasmin Xavier MD        0.9 % sodium chloride infusion   IntraVENous PRN Yasmin Xavier MD        polyethylene glycol (GLYCOLAX) packet 17 g  17 g Oral Daily PRN Yasmin Xavier MD        acetaminophen (TYLENOL) tablet 650 mg  650 mg Oral Q6H PRN Yasmin Xavier MD   650 mg at 02/13/23 4152    Or    acetaminophen (TYLENOL) suppository 650 mg  650 mg Rectal Q6H PRN Yasmin Xavier MD        ondansetron (ZOFRAN-ODT) disintegrating tablet 4 mg  4 mg Oral Q8H PRN Yasmin Xavier MD        Or    ondansetron [facility]) injection 4 mg  4 mg IntraVENous Q6H PRN Yasmin Xavier MD        heparin (porcine) injection 5,000 Units  5,000 Units SubCUTAneous 3 times per day Yasmin Xavier MD   5,000 Units at 02/14/23 0504    glucose chewable tablet 16 g  4 tablet Oral PRN Yasmin Xavier MD        dextrose bolus 10% 125 mL  125 mL IntraVENous PRN Yasmin Xavier MD        Or    dextrose bolus 10% 250 mL  250 mL IntraVENous PRN Yasmin Xavier MD        glucagon (rDNA) injection 1 mg  1 mg SubCUTAneous PRN Azul Encarnacion MD        dextrose 10 % infusion   IntraVENous Continuous PRN Azul Encarnacion MD        gabapentin (NEURONTIN) capsule 300 mg  300 mg Oral Daily Azul Encarnacion MD   300 mg at 02/13/23 6718    sodium bicarbonate tablet 1,300 mg  1,300 mg Oral BID Azul Encarnacion MD   1,300 mg at 02/13/23 2034    carvedilol (COREG) tablet 6.25 mg  6.25 mg Oral BID Rustam Sterling, DO   6.25 mg at 02/13/23 2033    amLODIPine (NORVASC) tablet 5 mg  5 mg Oral Daily Rustam Sterling, DO   5 mg at 02/13/23 0925    [Held by provider] insulin glargine (LANTUS) injection vial 45 Units  45 Units SubCUTAneous BID Azul Encarnacion MD   45 Units at 02/12/23 2113    insulin lispro (HUMALOG) injection vial 0-16 Units  0-16 Units SubCUTAneous TID WC Azul Encarnacion MD   4 Units at 02/13/23 1150    insulin lispro (HUMALOG) injection vial 0-4 Units  0-4 Units SubCUTAneous Nightly Azul Encarnacion MD   4 Units at 02/13/23 2043    benzonatate (TESSALON) capsule 100 mg  100 mg Oral TID PRN Azul Encarnacion MD   100 mg at 02/14/23 0504    cefTRIAXone (ROCEPHIN) 1,000 mg in sodium chloride 0.9 % 50 mL IVPB (mini-bag)  1,000 mg IntraVENous Q24H Azul Encarnacion MD   Stopped at 02/13/23 2029    bumetanide (BUMEX) tablet 2 mg  2 mg Oral BID Azul Encarnacion MD   2 mg at 02/13/23 2034    dextromethorphan (DELSYM) 30 MG/5ML extended release liquid 30 mg  30 mg Oral 2 times per day Azul Encarnacion MD   30 mg at 02/13/23 2033     No intake or output data in the 24 hours ending 02/14/23 0069    Recent Results (from the past 24 hour(s))   CBC with Auto Differential    Collection Time: 02/13/23  6:39 AM   Result Value Ref Range    WBC 6.0 3.5 - 11.0 k/uL    RBC 3.46 (L) 4.0 - 5.2 m/uL    Hemoglobin 10.9 (L) 12.0 - 16.0 g/dL    Hematocrit 32.7 (L) 36 - 46 %    MCV 94.7 80 - 100 fL    MCH 31.4 26 - 34 pg    MCHC 33.2 31 - 37 g/dL    RDW 15.1 (H) 11.5 - 14.9 %    Platelets 893 013 - 292 k/uL    MPV 8.9 6.0 - 12.0 fL    Seg Neutrophils 73 (H) 36 - 66 %    Lymphocytes 14 (L) 24 - 44 %    Monocytes 8 (H) 1 - 7 %    Eosinophils % 4 0 - 4 %    Basophils 1 0 - 2 %    Segs Absolute 4.40 1.3 - 9.1 k/uL    Absolute Lymph # 0.80 (L) 1.0 - 4.8 k/uL    Absolute Mono # 0.50 0.1 - 1.3 k/uL    Absolute Eos # 0.30 0.0 - 0.4 k/uL    Basophils Absolute 0.10 0.0 - 0.2 k/uL   SPECIMEN REJECTION    Collection Time: 02/13/23  6:39 AM   Result Value Ref Range    Specimen Source . BLOOD     Ordered Test BMPX     Reason for Rejection Unable to perform testing: Specimen hemolyzed.     Basic Metabolic Panel w/ Reflex to MG    Collection Time: 02/13/23  7:24 AM   Result Value Ref Range    Glucose 212 (H) 70 - 99 mg/dL    BUN 70 (H) 8 - 23 mg/dL    Creatinine 3.12 (H) 0.50 - 0.90 mg/dL    Est, Glom Filt Rate 16 (L) >60 mL/min/1.73m2    Calcium 8.8 8.6 - 10.4 mg/dL    Sodium 143 135 - 144 mmol/L    Potassium 3.7 3.7 - 5.3 mmol/L    Chloride 101 98 - 107 mmol/L    CO2 32 (H) 20 - 31 mmol/L    Anion Gap 10 9 - 17 mmol/L   POC Glucose Fingerstick    Collection Time: 02/13/23 11:10 AM   Result Value Ref Range    POC Glucose 219 (H) 65 - 105 mg/dL   POC Glucose Fingerstick    Collection Time: 02/13/23  4:04 PM   Result Value Ref Range    POC Glucose 149 (H) 65 - 105 mg/dL   POC Glucose Fingerstick    Collection Time: 02/13/23  8:40 PM   Result Value Ref Range    POC Glucose 324 (H) 65 - 105 mg/dL   CBC with Auto Differential    Collection Time: 02/14/23  5:44 AM   Result Value Ref Range    WBC 5.1 3.5 - 11.0 k/uL    RBC 3.64 (L) 4.0 - 5.2 m/uL    Hemoglobin 11.3 (L) 12.0 - 16.0 g/dL    Hematocrit 34.5 (L) 36 - 46 %    MCV 94.7 80 - 100 fL    MCH 31.1 26 - 34 pg    MCHC 32.9 31 - 37 g/dL    RDW 15.0 (H) 11.5 - 14.9 %    Platelets 784 697 - 004 k/uL    MPV 9.0 6.0 - 12.0 fL    Seg Neutrophils 68 (H) 36 - 66 %    Lymphocytes 17 (L) 24 - 44 %    Monocytes 8 (H) 1 - 7 %    Eosinophils % 6 (H) 0 - 4 %    Basophils 1 0 - 2 %    Segs Absolute 3.50 1.3 - 9.1 k/uL Absolute Lymph # 0.90 (L) 1.0 - 4.8 k/uL    Absolute Mono # 0.40 0.1 - 1.3 k/uL    Absolute Eos # 0.30 0.0 - 0.4 k/uL    Basophils Absolute 0.00 0.0 - 0.2 k/uL     -----------------------------------------------------------------  RAD:  EKG:  Micro:     Assessment & Plan:    Patient Active Problem List:     Atherosclerosis of coronary artery bypass graft of native heart without angina pectoris     Acute kidney injury superimposed on CKD ([facility])     Acute on chronic diastolic heart failure ([facility])     Diabetic polyneuropathy associated with type 2 diabetes mellitus ([facility] [address]     History of coronary artery bypass graft     Iron deficiency anemia     Spinal stenosis of lumbar region with neurogenic claudication     Mixed hyperlipidemia     CKD (chronic kidney disease) stage 4, GFR 15-29 ml/min ([facility])     Type 2 diabetes 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, with long-term current use of insulin ([facility])     Obesity, Class II, BMI 35-39.9     Thyroid nodule greater than or equal to 1 cm in diameter incidentally noted on imaging study     Hypertension, essential     Chronic ischemic heart disease     Ischemic stroke of frontal lobe ([facility])     Morbid obesity with BMI of 40.0-44.9, adult ([facility])     Disequilibrium syndrome     Anxiety     Chronic midline low back pain with bilateral sciatica     COVID     Cerebral hypoperfusion     Immature arteriovenous fistula ([facility])     History of fusion of cervical spine     Depression with anxiety     Vancomycin resistant Enterococcus UTI     Dialysis disequilibrium syndrome     Acute cystitis without hematuria     VRE infection (vancomycin resistant Enterococcus)     Infection due to ESBL-producing Klebsiella pneumoniae     Class 2 severe obesity due to excess calories with serious comorbidity and body mass index (BMI) of 35.0 to 35.9 in adult [facility])     Type 2 diabetes mellitus with hyperglycemia, with long-term current use of insulin ([facility])     Right hemiparesis ([facility])     Ulcer of left foot, limited to breakdown of skin ([address]     Secondary hyperparathyroidism ([address]     Hypertensive urgency     Hypoxia     Congestive heart failure ([address]     Nephrotic range proteinuria     Acute respiratory failure with hypoxia ([facility])     Syncope and collapse     Subacute cough           Plan:  -Syncope and collapse - Cardiology consulted, stress test scheduled for this am.  -UTI - on IV Rocephin. -[place] - Nephrology consulted. -Disequilibrium syndrome - Neurology consulted. -Type 2 DM - hyperglycemia, will resume Lantus and SS coverage with a carb control diet.  -Continue current treatments. -D/C planning ongoing.  -Complete orders per chart.     See orders   Disposition:    Electronically signed by Fredi Dinh MD on 2/14/2023 at 6:24 AM

## 2023-02-14 NOTE — CARE COORDINATION
DISCHARGE PLANNING NOTE:     Plan is for [facility]. Spoke with [place] from the [facility] who states pt will require pre-cert to return. Pre-cert started yesterday. Had stress test yesterday and will have cardiac cath today. Active order for IV Rocephin. Will continue to follow for additional discharge needs.     Electronically signed by Melanie Paula RN on 2/14/2023 at 8:50 AM

## 2023-02-14 NOTE — PROGRESS NOTES
Received post cardiac cath procedure to [facility] room 2. Assessment obtained. Restrictions reviewed with patient. Post procedure pathway initiated. Rt. groin site soft , dressing dry and intact. No hematoma noted. Family at side. Patient without complaints. Head of bed flat with Rt. leg straight.

## 2023-02-14 NOTE — PROGRESS NOTES
Patient admitted, consent signed and questions answered. Patient ready for procedure. Call light to reach with side rails up 2 of 2. B/L Groin clipped with writer and Annette RN present. Family at bedside with patient. History and physical complete.

## 2023-02-14 NOTE — PROGRESS NOTES
[facility]   INPATIENT OCCUPATIONAL THERAPY  PROGRESS NOTE  Date: 2023  Patient Name: Ian Max       Room: 4510/9878-30  MRN: [id number]    : 1960  (57 y.o.)  Gender: female   Referring Practitioner: Ronda Joseph MD  Diagnosis: Syncope and collapse    Restrictions/Precautions  Restrictions/Precautions  Restrictions/Precautions: Fall Risk;Up as Tolerated  Required Braces or Orthoses?: No    Vitals  O2 Device: Nasal cannula  Comment: 4L (Fatigue noted with brief removal of NC for grooming.)    Subjective  Subjective  Subjective: \"I just get so tired standing there\" Pt stated regarding sinkside tasks. Pt requesting to complete tasks while seated in bedside chair. Subjective  Subjective: Pt denies pain. Comments: DEDE Le ok'd pt for OT treat. Pt agreeable to participate and pleasant/cooperative throughout. Objective  Orientation  Overall Orientation Status: Within Functional Limits  Cognition  Overall Cognitive Status: WFL    Activities of Daily Living  ADL  Equipment Provided: Reacher, Long-handled sponge  Feeding: Setup  Grooming: Setup  Grooming Skilled Clinical Factors: pt engaged in face washing, oral care, hair care (shampoo cap and combing) while seated in bedside chair. UE Bathing: Setup  LE Bathing: Minimal assistance  LE Bathing Skilled Clinical Factors: with LHS use for lower legs/feet. UE Dressing: Stand by assistance  LE Dressing: Moderate assistance  LE Dressing Skilled Clinical Factors: Assist to maria de jesus B socks, pt able to demonstrate use of reacher for threading. Toileting: Stand by assistance  Toileting Skilled Clinical Factors: per pt report completed earlier this morning. Additional Comments: ADL scores based on skilled observation and clinical reasoning unless otherwise noted. Pt is currently limited by dizziness, balance, strength, and endurance impacting performance in self care tasks.      Balance  Balance  Sitting Balance: Stand by assistance (Pt tolerates ~5 min of sitting EOB)  Standing Balance: Stand by assistance  Standing Balance  Time: ~1 min  Activity: functional transfer/mobility around bed to bedside chair. Transfers/Mobility  Bed mobility  Supine to Sit: Stand by assistance  Scooting: Stand by assistance  Bed Mobility Comments: HOB elevated with use of bedrail  Transfers  Sit to stand: Stand by assistance  Stand to sit: Stand by assistance  Transfer Comments: good hand placement noted.     Functional Mobility  Functional - Mobility Device: Rolling Walker  Activity: Other (around bed>chair.)  Assist Level: Stand by assistance (close SBA)         Patient Education  Patient Education  Education Given To: Patient  Education Provided: Role of Therapy, Plan of Care, Transfer Training  Education Method: Verbal  Barriers to Learning: None  Education Outcome: Verbalized understanding, Continued education needed      Goals  Short Term Goals  Time Frame for Short Term Goals: By discharge, pt will  Short Term Goal 1: perform functional transfers/mobility during self care tasks with SBA, least restrictive device, and Good safety  Short Term Goal 2: be educated on and explore use of DME/AE to increase ease and independence during ADLs  Short Term Goal 3: perform lower body ADLs with CGA and adaptive equipment/techs as needed  Short Term Goal 4: tolerate standing and reaching for 6+ minutes with SBA and no LOB during functional activity  Short Term Goal 5: actively participate in 15+ minutes of therapeutic exercise/functional activity to increase overall strength/endurance needed for ADLs  Occupational Therapy Plan  Times Per Week: 4-6  Current Treatment Recommendations: Strengthening, Functional mobility training, Balance training, Endurance training, Pain management, Safety education & training, Equipment evaluation, education, & procurement, Patient/Caregiver education & training, Self-Care / ADL, Home management training      Assessment  Activity Tolerance  Activity Tolerance: Patient limited by fatigue, Patient Tolerated treatment well  Assessment  Performance deficits / Impairments: Decreased functional mobility , Decreased ADL status, Decreased strength, Decreased endurance, Decreased balance, Decreased high-level IADLs, Decreased safe awareness  Treatment Diagnosis: impaired self care status  Prognosis: Good  Decision Making: Medium Complexity  Discharge Recommendations: Patient would benefit from continued therapy after discharge  OT Equipment Recommendations  Other: TBD  Safety Devices  Type of Devices: Nurse notified, Call light within reach, Left in chair, Gait belt      AM-PAC Daily Activities Inpatient  AM-PAC Daily Activity - Inpatient   How much help is needed for putting on and taking off regular lower body clothing?: A Lot  How much help is needed for bathing (which includes washing, rinsing, drying)?: A Little  How much help is needed for toileting (which includes using toilet, bedpan, or urinal)?: A Little  How much help is needed for putting on and taking off regular upper body clothing?: A Little  How much help is needed for taking care of personal grooming?: A Little  How much help for eating meals?: A Little  AM-[facility] Inpatient Daily Activity Raw Score: 17  AM-PAC Inpatient ADL T-Scale Score : 37.26  ADL Inpatient CMS 0-100% Score: 50.11  ADL Inpatient CMS G-Code Modifier : CK    OT Minutes  OT Individual Minutes  Time In: 1054  Time Out: 0913  Minutes: 45      Electronically signed by DAYANA Kincaid on 2/14/2023 at 11:14 AM

## 2023-02-14 NOTE — PROGRESS NOTES
Dr. Henry Gilmore was notified via perfect serve of patient wanting to move forward with cardiac cath and the risks of possibly having to get back on dialysis, she has been NPO since midnight. He will see patient this morning and discuss.

## 2023-02-14 NOTE — PROGRESS NOTES
[facility]   Progress Note                   Date:   2/14/2023  Patient name: Lisa Huber  Date of admission:  2/11/2023  7:52 PM  MRN:   [id number]  YOB: 1960  PCP: AFSANEH Mathew CNP    Reason for Admission:      Subjective:       Clinical Changes / Abnormalities:   Seen and examined. No cp. She spoke with nephro today and after risks/benefits, she wants to proceed with cath. Medications:   Scheduled Meds:   atorvastatin  40 mg Oral Nightly    aspirin  81 mg Oral Daily    docusate sodium  100 mg Oral BID    linaclotide  145 mcg Oral QAM AC    pantoprazole  40 mg Oral QAM AC    tamsulosin  0.4 mg Oral Daily    allopurinol  100 mg Oral Daily    sodium chloride flush  5-40 mL IntraVENous 2 times per day    heparin (porcine)  5,000 Units SubCUTAneous 3 times per day    gabapentin  300 mg Oral Daily    sodium bicarbonate  1,300 mg Oral BID    carvedilol  6.25 mg Oral BID    amLODIPine  5 mg Oral Daily    [Held by provider] insulin glargine  45 Units SubCUTAneous BID    insulin lispro  0-16 Units SubCUTAneous TID WC    insulin lispro  0-4 Units SubCUTAneous Nightly    cefTRIAXone (ROCEPHIN) IV  1,000 mg IntraVENous Q24H    bumetanide  2 mg Oral BID    dextromethorphan  30 mg Oral 2 times per day     Continuous Infusions:   sodium chloride      sodium chloride      dextrose       CBC:   Recent Labs     02/12/23  0607 02/13/23  0639 02/14/23  0544   WBC 7.6 6.0 5.1   HGB 10.6* 10.9* 11.3*    182 174       BMP:    Recent Labs     02/12/23  0607 02/13/23  0724 02/14/23  0544   * 143 142   K 3.4* 3.7 3.8    101 98   CO2 33* 32* 31   BUN 65* 70* 70*   CREATININE 2.96* 3.12* 3.01*   GLUCOSE 93 212* 269*       Hepatic: No results for input(s): AST, ALT, ALB, BILITOT, ALKPHOS in the last 72 hours. Troponin: No results for input(s): TROPONINI in the last 72 hours. BNP: No results for input(s): BNP in the last 72 hours.   Lipids: No results for input(s): CHOL, HDL in the last 72 hours. Invalid input(s): LDLCALCU  INR: No results for input(s): INR in the last 72 hours. Objective:   Vitals: BP (!) 132/54   Pulse 55   Temp 98.1 °F (36.7 °C)   Resp 18   Wt 248 lb (112.5 kg)   SpO2 94%   BMI 41.27 kg/m²   No intake/output data recorded. No intake/output data recorded.   Scheduled Meds:   atorvastatin  40 mg Oral Nightly    aspirin  81 mg Oral Daily    docusate sodium  100 mg Oral BID    linaclotide  145 mcg Oral QAM AC    pantoprazole  40 mg Oral QAM AC    tamsulosin  0.4 mg Oral Daily    allopurinol  100 mg Oral Daily    sodium chloride flush  5-40 mL IntraVENous 2 times per day    heparin (porcine)  5,000 Units SubCUTAneous 3 times per day    gabapentin  300 mg Oral Daily    sodium bicarbonate  1,300 mg Oral BID    carvedilol  6.25 mg Oral BID    amLODIPine  5 mg Oral Daily    [Held by provider] insulin glargine  45 Units SubCUTAneous BID    insulin lispro  0-16 Units SubCUTAneous TID WC    insulin lispro  0-4 Units SubCUTAneous Nightly    cefTRIAXone (ROCEPHIN) IV  1,000 mg IntraVENous Q24H    bumetanide  2 mg Oral BID    dextromethorphan  30 mg Oral 2 times per day     Continuous Infusions:   sodium chloride      sodium chloride      dextrose       PRN Meds:.melatonin, sodium chloride flush, sodium chloride flush, sodium chloride, polyethylene glycol, acetaminophen **OR** acetaminophen, ondansetron **OR** ondansetron, glucose, dextrose bolus **OR** dextrose bolus, glucagon (rDNA), dextrose, benzonatate    CONSTITUTIONAL: AOx4, no apparent distress, appears stated age   HEAD: normocephalic, atraumatic   EYES: PERRLA, EOMI   ENT: moist mucous membranes, uvula midline   NECK: symmetric, no midline tenderness to palpation   LUNGS: clear to auscultation bilaterally   CARDIOVASCULAR: regular rate and rhythm, no murmurs, rubs or gallops   ABDOMEN: Soft, non-tender, non-distended with normal active bowel sounds   SKIN: no rash       EKG: NSR non-specific T changes QTc 495 ms     ECHO: 1/27/23  Summary  Global left ventricular systolic function is normal.  Estimated LVEF 50-55%. Mild concentric left ventricular hypertrophy. Normal right ventricular size and function. No significant valvular regurgitation or stenosis seen. No pericardial effusion seen. Stress Test: 2/13/23  Impression       Large infarct in the lateral wall extending into the cardiac apex with some   stalin-infarct ischemia in anterolateral wall. LVEF normal.       Risk stratification: High risk patient has no prior history of evidence of   MI. Low risk if there is known prior history. Assessment / Acute Cardiac Problems:   - Syncope- ? Due to hypotension  - CAD- CABG  - HFpEF  - Bradycardia  - Advanced CKD was on HD prior  - H/o DVT  - Recent echo reviewed  - H/o DM2    Plan of Treatment:   - she discussed with nephro- after discussion of risk/benefits, she accepts risks and wants to proceed with cardiac cath  - will arrange for today  - I have recommend left heart catheterization with possible PCI. I have discussed risks (including but not limited to vascular injury, infection, hematoma, contrast induced kidney dysfunction, CVA and MI), benefits, alternatives in detail. All questions answered. Patient agrees to proceed.    - contineu ASA, statin, norvasc, coreg  - on bumex per nephro     Linda Dutta MD  [facility]  [phone]

## 2023-02-14 NOTE — OP NOTE
[facility]    CARDIAC CATHETERIZATION    Date:   2/14/2023  Patient name:  Gilford Rather  Date of admission:  2/14/2023 10:20 AM  MRN:   [id number]  YOB: 1960    Operators:  Primary:   ISAURO Mitchell MD (Attending Physician)    Procedure performed:     [x] Left Heart Catheterization. [] Graft Angiography. [x] Left Ventriculography. [] Right Heart Catheterization. [x] Coronary Angiography. [] Aortic Valve Studies. [] PCI:      [] Other:       Pre Procedure Conscious Sedation Data:  ASA Class:    [] I [] II [x] III [] IV    Mallampati Class:  [] I [x] II [] III [] IV      Indication:   Syncope   Abnormal Cardiolyte/Myoview      Procedure:  Diagnostic procedure: Lt Heart, Coronary Angio, LV pressure, Ultrasound used for access - Femoral  PCI procedure: PTCA / Drug Eluting Stent:, RCA and / or branches    Procedure: After informed consent was obtained with explanation of the risks and benefits, patient was brought to the cath lab. The access area was prepped and draped in sterile fashion. 1% lidocaine was used for local block. The artery was cannulated with 6  Fr sheath with brisk arterial blood return. The side port was frequently flushed and aspirated with normal saline. Estimated Blood Loss:  [x] Minimal < 25 cc [] Moderate 25-50 cc  [] >50 cc    Findings:  Cardiac Arteries and Lesion Findings  LMCA: has 80% diffuse disease. LAD: has proximal 100% occlusion. LIMA-LAD is patent. SVG-D is patent. LCx: has proximal 100% occlusion. SVG-OM 1 is patent with severe diffuse disease post anastomosis of  SVG-OM is known and not amenable to PCI. RCA: has proximal patent stent with mid 85% stenosis. RPDA has diffuse 80% stenosis but is a small  vessel. RPL is normal.  Lesion on Mid RCA: Mid subsection. 85% stenosis 20 mm length reduced to 0%. Pre procedure  ABEL III flow was noted. Post Procedure ABEL III flow was present. Good runoff was present.  The lesion  was diagnosed as High Risk (C). The lesion was discrete and eccentric. The lesion showed with  irregular contour, mild angulation and mild tortuosity. Devices used  Capital One Flex 180 cm. Number of passes: 1.   Euphora Balloon 2.5mm x 15mm. 3 inflation(s) to a max pressure of: 20 jayne.  Galliano [place] 2.75x22. 1 inflation(s) to a max pressure of: 20 jayne.  NC Euphora Balloon 3.0mm x 20mm. 2 inflation(s) to a max pressure of: 20 jayne    Dominance: Right    Conclusions:    Multivessel coronary artery disease. Patent LIMA-LAD, SVG-D and SVG-OM1. Severe diffuse disease post anastomosis of SVG-OM is known  and not amenable to PCI. Patent proximal RCA stent with new mid severe stenosis. RPDA has diffuse severe stenosis but small for  PCI. Successful PTCA/FADIA of mid RCA. Total contrast used 70ml. Recommendations:  Medical therapy as needed. Risk factor modification. Routine Post PCI Orders. ____________________________________________________________________    History and Risk Factors    [x] Hypertension     [] Family history of CAD  [] Hyperlipidemia     [] Cerebrovascular Disease   [] Prior MI       [] Peripheral Vascular disease   [] Prior PCI              [x] Diabetes Mellitus    [] Left Main PCI. [] Currently on Dialysis. [x] Prior CABG. [] Currently smoker. [] Cardiac Arrest outside of healthcare facility. [] Yes    [] No        Witnessed     [] Yes   [] No     Arrest after arrival of EMS  [] Yes   [] No     [] Cardiac Arrest at other Facility. [] Yes   [x] No    Pre-Procedure Information. Heart Failure       [] Yes    [x] No        Class  [] I      [] II  [] III    [] IV. New Diagnosis    [] Yes  [] No    HF Type      [] Systolic   [] Diastolic          [] Unknown. Diagnostic Test:   EKG       [x] Normal   [] Abnormal    New antiarrhythmia medications:    [] Yes   [] No   New onset atrial fibrillation / Flutter     [] Yes   [] No   ECG Abnormalities:      [] V. Fib   [] Rosey V.  Tach [] NS V. T   [] New LBBB           [] T. Inv  []  ST dev > 0.5 mm         [] PVC's freq  [] PVC's infrequent    Stress Test Performed:      [x] Yes    [] No     Type:     [] Stress Echo   [] Exercise Stress Test (no imaging)      [x] Stress Nuclear  [] Stress Imaging     Results   [] Negative   [x] Positive        [] Indeterminate  [] Unavailable     If Positive/ Risk / Extent of Ischemia:       [] Low  [] Intermediate         [x] High  [] Unavailable      Cardiac CTA Performed:     [] Yes    [x] No      Results   [] CAD   [] Non obstructive CAD      [] No CAD   [] Uncertain      [] Unknown   [] Structural Disease. Pre Procedure Medications:   [] Yes    [] No         [x] ASA   [x] Beta Blockers      [] Nitrate   [] Ca Channel Blockers      [] Ranolazine   [x] Statin       [] Plavix/Others antiplatelets      Electronically signed on 2/14/2023 at 1:39 PM by:    John Petersen MD  Fellow, [address]    I was present during entire procedure and performed all critical elements of the procedure.     Salbador Weems MD

## 2023-02-14 NOTE — PROGRESS NOTES
Patient arrived back on floor. She is remaining supine until 1625 per cath lab. Groin examined and there is no drainage and is covered with gauze and transparent dressing. Patient states she is having pain and would like a diet order asap.

## 2023-02-14 NOTE — PROGRESS NOTES
Report called to Gloria Wild, RN at [facility] cardiac cath lab. Patient's heart monitor was removed. She is taking all belongings. Patient being transported via stretcher. They were made aware of painful IV access when we went to start the NS 40/hr that was ordered and that I had tried to call down and get ultrasound guided IV but I have not got a call back from house supervisor.

## 2023-02-14 NOTE — PROGRESS NOTES
NEPHROLOGY PROGRESS NOTE    Reason for consultation: Management of chronic kidney disease stage IV. Consulting physician: Kylah Streeter MD.    Interval history: Patient was seen and examined today when she is scheduled to undergo cardiac catheterization at [facility] today. I had prolonged discussion with and emphasized that she is at about the highest risk category for contrast nephropathy and has over 50% probability of needing dialysis post contrast exposure. Cardiac stress test performed 2/13/2023 showed large infarct in the lateral wall extending into the cardiac apex with some stalin-infarct ischemia in the anterolateral wall and with normal left ventricular ejection fraction. History of Present Illness: This is a 57 y.o. female with history of hypertension, longstanding diabetes mellitus with diabetic nephropathy, Coronary artery disease status post CABG x 3  Chronic CHF with diastolic dysfunction, who presents the ER of [facility] with syncopal episode. Patient has underlying CKD stage IV secondary to diabetic nephropathy. and previous history of dialysis dependency which was  discontinued Aug 2022 due to regaining residual kidney function. She recently was discharged from [facility] on 2/8/2023  after presenting with weight gain and shortness of breath, acute hypoxic respiratory failure secondary to decompensated acute on chronic diastolic heart failure and Hypertensive urgency. Patient was optimally diuresed and  was discharged to [address] [place] [address] on a regimen of Bumex 3 mg twice daily and Metolazone 5 mg every other day. She reports passing out at the [facility] while standing up and attempting to walk across her room yesterday. She has recent onset of lightheadedness 4 days prior . She was  hypotensive and reports two of her  blood pressure medications were held.  She  was found to have a UTI isolating  Enterococcus faecalis on 2/7/2022 sensitive to Cipro and commenced on treatment with ciprofloxacin. She denies any flank pain. She has been experiencing dysuria and urgency. No fever, nausea vomiting . She has mild decrease in oral intake. Serum creatinine had risen to 3.3 mg/dl on 2/7/2023 and on presentation to [facility] on 2/11/2023 creatinine was 3.04 mg/dL. Creatinine today is 2.96 mg/dL with BUN of 65, potassium 3.4 and sodium of 145. She has normal white count and hemoglobin of 10.6  She was previously on dialysis initiated in August 2021 following episode of ATN after contrast exposure. She had a brief period of transient recovery of renal function between February and April 2022 Following which dialysis was restarted due to volume overload resistant to diuretic treatment. 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 She was given a trial off dialysis in August 2022 and has not required dialysis since then. Volume overload has been managed with diuretics. Patient's current baseline creatinine has ranged between  2.6 to 3.0 mg/dL. She had previously followed up with Dr. Sheila Wayne and currently receives nephrological care with [facility].     Current Medications:    0.9 % sodium chloride infusion, Continuous  melatonin tablet 9 mg, Nightly PRN  sodium chloride flush 0.9 % injection 10 mL, PRN  atorvastatin (LIPITOR) tablet 40 mg, Nightly  aspirin chewable tablet 81 mg, Daily  docusate sodium (COLACE) capsule 100 mg, BID  linaclotide (LINZESS) capsule 145 mcg, QAM AC  pantoprazole (PROTONIX) tablet 40 mg, QAM AC  tamsulosin (FLOMAX) capsule 0.4 mg, Daily  allopurinol (ZYLOPRIM) tablet 100 mg, Daily  sodium chloride flush 0.9 % injection 5-40 mL, 2 times per day  sodium chloride flush 0.9 % injection 5-40 mL, PRN  0.9 % sodium chloride infusion, PRN  polyethylene glycol (GLYCOLAX) packet 17 g, Daily PRN  acetaminophen (TYLENOL) tablet 650 mg, Q6H PRN   Or  acetaminophen (TYLENOL) suppository 650 mg, Q6H PRN  ondansetron (ZOFRAN-ODT) disintegrating tablet 4 mg, Q8H PRN   Or  ondansetron (ZOFRAN) injection 4 mg, Q6H PRN  heparin (porcine) injection 5,000 Units, 3 times per day  glucose chewable tablet 16 g, PRN  dextrose bolus 10% 125 mL, PRN   Or  dextrose bolus 10% 250 mL, PRN  glucagon (rDNA) injection 1 mg, PRN  dextrose 10 % infusion, Continuous PRN  gabapentin (NEURONTIN) capsule 300 mg, Daily  sodium bicarbonate tablet 1,300 mg, BID  carvedilol (COREG) tablet 6.25 mg, BID  amLODIPine (NORVASC) tablet 5 mg, Daily  [Held by provider] insulin glargine (LANTUS) injection vial 45 Units, BID  insulin lispro (HUMALOG) injection vial 0-16 Units, TID WC  insulin lispro (HUMALOG) injection vial 0-4 Units, Nightly  benzonatate (TESSALON) capsule 100 mg, TID PRN  cefTRIAXone (ROCEPHIN) 1,000 mg in sodium chloride 0.9 % 50 mL IVPB (mini-bag), Q24H  bumetanide (BUMEX) tablet 2 mg, BID  dextromethorphan (DELSYM) 30 MG/5ML extended release liquid 30 mg, 2 times per day    Objective:  CURRENT TEMPERATURE:  Temp: 98.1 °F (36.7 °C)  MAXIMUM TEMPERATURE OVER 24HRS:  Temp (24hrs), Av.1 °F (36.7 °C), Min:98.1 °F (36.7 °C), Max:98.1 °F (36.7 °C)    CURRENT RESPIRATORY RATE:  Resp: 18  CURRENT PULSE:  Heart Rate: 55  CURRENT BLOOD PRESSURE:  BP: (!) 132/54  24HR BLOOD PRESSURE RANGE:  Systolic (57FCJ), NXN:696 , Min:100 , GRT:653   ; Diastolic (49LZT), IRQ:16, Min:48, Max:54    24HR INTAKE/OUTPUT:  No intake or output data in the 24 hours ending 23 1006    Patient Vitals for the past 96 hrs (Last 3 readings):   Weight   23 248 lb (112.5 kg)       Physical Exam:  GENERAL APPEARANCE: Alert and cooperative, and appears to be in no acute distress. HEAD: normocephalic  EYES: PERRL, EOMI. Not pale, anicteric   NOSE:  No nasal discharge. THROAT:  Oral cavity and pharynx normal. Moist  NECK: Neck supple, non-tender without lymphadenopathy, masses or thyromegaly.  JVD-neg  CARDIAC: Normal S1 and S2. No S3, S4 or murmurs. Rhythm is regular. LUNGS: Clear to auscultation and percussion without rales, rhonchi, wheezing or diminished breath sounds. ABDOMEN: soft non distended, BS+ Non tender no organomegally  MUSKULOSKELETAL: Adequately aligned spine. No joint erythema or tenderness. EXTREMITIES: No edema. Peripheral pulses intact.    NEURO:Alert oriented x 3 ,power 5/5 in all extremities    Labs:   CBC:  Recent Labs     02/12/23  0607 02/13/23  0639 02/14/23  0544   WBC 7.6 6.0 5.1   RBC 3.38* 3.46* 3.64*   HGB 10.6* 10.9* 11.3*   HCT 31.9* 32.7* 34.5*   MCV 94.4 94.7 94.7   MCH 31.4 31.4 31.1   MCHC 33.3 33.2 32.9   RDW 15.0* 15.1* 15.0*    182 174   MPV 9.0 8.9 9.0        BMP:   Recent Labs     02/12/23  0607 02/13/23  0724 02/14/23  0544   * 143 142   K 3.4* 3.7 3.8    101 98   CO2 33* 32* 31   BUN 65* 70* 70*   CREATININE 2.96* 3.12* 3.01*   GLUCOSE 93 212* 269*   CALCIUM 8.8 8.8 8.9        Magnesium:   Recent Labs     02/12/23  0607   MG 2.2       IRON:    Lab Results   Component Value Date/Time    IRON 83 09/27/2022 01:32 PM     TIBC:    Lab Results   Component Value Date/Time    TIBC 217 09/27/2022 01:32 PM     FERRITIN:    Lab Results   Component Value Date/Time    FERRITIN 117 09/27/2022 01:32 PM     SPEP:   Lab Results   Component Value Date/Time    PROT 6.4 02/07/2023 05:45 AM     UPEP:   Lab Results   Component Value Date/Time    TPU 20 11/12/2021 10:30 AM      Urine Sodium:    Lab Results   Component Value Date/Time    JASON 47 11/14/2021 02:04 AM      Urine Creatinine:    Lab Results   Component Value Date/Time    LABCREA 65.4 01/26/2023 01:10 PM     Urine Protein:    Lab Results   Component Value Date/Time    TPU 20 11/12/2021 10:30 AM     Urinalysis:  U/A:   Lab Results   Component Value Date/Time    NITRU NEGATIVE 02/07/2023 03:00 PM    COLORU Yellow 02/07/2023 03:00 PM    PHUR 5.5 02/07/2023 03:00 PM    WBCUA 2 TO 5 02/07/2023 03:00 PM    RBCUA None 02/07/2023 03:00 PM    MUCUS 1+ 02/07/2023 03:00 PM    TRICHOMONAS NOT REPORTED 11/14/2021 02:05 AM    YEAST FEW 02/07/2023 03:00 PM    BACTERIA MANY 02/07/2023 03:00 PM    SPECGRAV 1.016 02/07/2023 03:00 PM    LEUKOCYTESUR SMALL 02/07/2023 03:00 PM    UROBILINOGEN Normal 02/07/2023 03:00 PM    BILIRUBINUR NEGATIVE 02/07/2023 03:00 PM    GLUCOSEU NEGATIVE 02/07/2023 03:00 PM    KETUA NEGATIVE 02/07/2023 03:00 PM    AMORPHOUS 2+ 07/31/2022 12:00 PM     Assessment/plan:    1. Chronic kidney disease stage IV [Baseline serum creatinine 2.6 to 3.0 mg/dL over the past few months] - secondary to diabetic nephropathy. Serum creatinine today is 3.01 mg/dL [estimated GFR 17 mL/min]. Patient understands our risk profile and agrees to undergo cardiac catheterization. Plan: Hold Bumex x24 hours. IV fluid 0.9 normal saline at 40 mill per hour x24 hours. Check basic metabolic profile daily. Minimize volume of contrast and avoid left ventriculogram if necessary. 2.  Systemic hypertension - blood pressure is adequately controlled. 3.  Syncope associated with orthostasis. Clinically improved. 4.  Coronary artery disease s/p CABG - Cardiac catheterization in 2019 showed patent grafts. For cardiac cath today. 5.  Enterococcus faecalis urinary tract infection - on IV ceftriaxone. Prognosis is guarded.     Electronically signed by Padmaja Cool MD on 2/14/2023 at 10:06 AM

## 2023-02-15 ENCOUNTER — APPOINTMENT (OUTPATIENT)
Dept: GENERAL RADIOLOGY | Age: 63
DRG: 247 | End: 2023-02-15
Payer: COMMERCIAL

## 2023-02-15 LAB
ABSOLUTE EOS #: 0.3 K/UL (ref 0–0.4)
ABSOLUTE LYMPH #: 0.6 K/UL (ref 1–4.8)
ABSOLUTE MONO #: 0.4 K/UL (ref 0.1–1.3)
ANION GAP SERPL CALCULATED.3IONS-SCNC: 10 MMOL/L (ref 9–17)
BASOPHILS # BLD: 0 % (ref 0–2)
BASOPHILS ABSOLUTE: 0 K/UL (ref 0–0.2)
BNP SERPL-MCNC: 2010 PG/ML
BUN SERPL-MCNC: 60 MG/DL (ref 8–23)
CALCIUM SERPL-MCNC: 8.9 MG/DL (ref 8.6–10.4)
CHLORIDE SERPL-SCNC: 100 MMOL/L (ref 98–107)
CO2 SERPL-SCNC: 31 MMOL/L (ref 20–31)
CREAT SERPL-MCNC: 2.62 MG/DL (ref 0.5–0.9)
EOSINOPHILS RELATIVE PERCENT: 4 % (ref 0–4)
GFR SERPL CREATININE-BSD FRML MDRD: 20 ML/MIN/1.73M2
GLUCOSE BLD-MCNC: 170 MG/DL (ref 65–105)
GLUCOSE BLD-MCNC: 189 MG/DL (ref 65–105)
GLUCOSE BLD-MCNC: 223 MG/DL (ref 65–105)
GLUCOSE BLD-MCNC: 359 MG/DL (ref 65–105)
GLUCOSE SERPL-MCNC: 262 MG/DL (ref 70–99)
HCT VFR BLD AUTO: 34.4 % (ref 36–46)
HGB BLD-MCNC: 11.7 G/DL (ref 12–16)
LYMPHOCYTES # BLD: 10 % (ref 24–44)
MCH RBC QN AUTO: 31.8 PG (ref 26–34)
MCHC RBC AUTO-ENTMCNC: 33.9 G/DL (ref 31–37)
MCV RBC AUTO: 93.8 FL (ref 80–100)
MONOCYTES # BLD: 6 % (ref 1–7)
PDW BLD-RTO: 14.6 % (ref 11.5–14.9)
PLATELET # BLD AUTO: 190 K/UL (ref 150–450)
PMV BLD AUTO: 9.2 FL (ref 6–12)
POTASSIUM SERPL-SCNC: 3.7 MMOL/L (ref 3.7–5.3)
RBC # BLD: 3.67 M/UL (ref 4–5.2)
SEG NEUTROPHILS: 80 % (ref 36–66)
SEGMENTED NEUTROPHILS ABSOLUTE COUNT: 4.9 K/UL (ref 1.3–9.1)
SODIUM SERPL-SCNC: 141 MMOL/L (ref 135–144)
WBC # BLD AUTO: 6.2 K/UL (ref 3.5–11)

## 2023-02-15 PROCEDURE — 6360000002 HC RX W HCPCS: Performed by: STUDENT IN AN ORGANIZED HEALTH CARE EDUCATION/TRAINING PROGRAM

## 2023-02-15 PROCEDURE — 80048 BASIC METABOLIC PNL TOTAL CA: CPT

## 2023-02-15 PROCEDURE — 6370000000 HC RX 637 (ALT 250 FOR IP): Performed by: FAMILY MEDICINE

## 2023-02-15 PROCEDURE — 1200000000 HC SEMI PRIVATE

## 2023-02-15 PROCEDURE — 99232 SBSQ HOSP IP/OBS MODERATE 35: CPT | Performed by: FAMILY MEDICINE

## 2023-02-15 PROCEDURE — 6370000000 HC RX 637 (ALT 250 FOR IP): Performed by: INTERNAL MEDICINE

## 2023-02-15 PROCEDURE — 36415 COLL VENOUS BLD VENIPUNCTURE: CPT

## 2023-02-15 PROCEDURE — 2580000003 HC RX 258: Performed by: STUDENT IN AN ORGANIZED HEALTH CARE EDUCATION/TRAINING PROGRAM

## 2023-02-15 PROCEDURE — 82947 ASSAY GLUCOSE BLOOD QUANT: CPT

## 2023-02-15 PROCEDURE — 6360000002 HC RX W HCPCS: Performed by: INTERNAL MEDICINE

## 2023-02-15 PROCEDURE — 71045 X-RAY EXAM CHEST 1 VIEW: CPT

## 2023-02-15 PROCEDURE — 83880 ASSAY OF NATRIURETIC PEPTIDE: CPT

## 2023-02-15 PROCEDURE — 97110 THERAPEUTIC EXERCISES: CPT

## 2023-02-15 PROCEDURE — 97530 THERAPEUTIC ACTIVITIES: CPT

## 2023-02-15 PROCEDURE — 6370000000 HC RX 637 (ALT 250 FOR IP): Performed by: STUDENT IN AN ORGANIZED HEALTH CARE EDUCATION/TRAINING PROGRAM

## 2023-02-15 PROCEDURE — 85025 COMPLETE CBC W/AUTO DIFF WBC: CPT

## 2023-02-15 RX ORDER — BUMETANIDE 1 MG/1
1 TABLET ORAL 2 TIMES DAILY
Status: DISCONTINUED | OUTPATIENT
Start: 2023-02-15 | End: 2023-02-17

## 2023-02-15 RX ORDER — FUROSEMIDE 10 MG/ML
40 INJECTION INTRAMUSCULAR; INTRAVENOUS ONCE
Status: COMPLETED | OUTPATIENT
Start: 2023-02-15 | End: 2023-02-15

## 2023-02-15 RX ADMIN — CARVEDILOL 6.25 MG: 6.25 TABLET, FILM COATED ORAL at 21:20

## 2023-02-15 RX ADMIN — BUMETANIDE 1 MG: 1 TABLET ORAL at 21:20

## 2023-02-15 RX ADMIN — HEPARIN SODIUM 5000 UNITS: 5000 INJECTION INTRAVENOUS; SUBCUTANEOUS at 21:21

## 2023-02-15 RX ADMIN — ATORVASTATIN CALCIUM 80 MG: 80 TABLET, FILM COATED ORAL at 21:20

## 2023-02-15 RX ADMIN — HEPARIN SODIUM 5000 UNITS: 5000 INJECTION INTRAVENOUS; SUBCUTANEOUS at 15:02

## 2023-02-15 RX ADMIN — BUMETANIDE 1 MG: 1 TABLET ORAL at 15:02

## 2023-02-15 RX ADMIN — DOCUSATE SODIUM 100 MG: 100 CAPSULE, LIQUID FILLED ORAL at 08:22

## 2023-02-15 RX ADMIN — AMLODIPINE BESYLATE 5 MG: 5 TABLET ORAL at 08:23

## 2023-02-15 RX ADMIN — HEPARIN SODIUM 5000 UNITS: 5000 INJECTION INTRAVENOUS; SUBCUTANEOUS at 05:17

## 2023-02-15 RX ADMIN — FUROSEMIDE 40 MG: 10 INJECTION, SOLUTION INTRAMUSCULAR; INTRAVENOUS at 09:38

## 2023-02-15 RX ADMIN — TICAGRELOR 90 MG: 90 TABLET ORAL at 21:20

## 2023-02-15 RX ADMIN — ALLOPURINOL 100 MG: 100 TABLET ORAL at 08:22

## 2023-02-15 RX ADMIN — TICAGRELOR 90 MG: 90 TABLET ORAL at 08:32

## 2023-02-15 RX ADMIN — SODIUM CHLORIDE, PRESERVATIVE FREE 10 ML: 5 INJECTION INTRAVENOUS at 08:24

## 2023-02-15 RX ADMIN — Medication 60 MG: at 21:21

## 2023-02-15 RX ADMIN — BENZONATATE 100 MG: 100 CAPSULE ORAL at 04:15

## 2023-02-15 RX ADMIN — INSULIN LISPRO 16 UNITS: 100 INJECTION, SOLUTION INTRAVENOUS; SUBCUTANEOUS at 11:47

## 2023-02-15 RX ADMIN — LINACLOTIDE 145 MCG: 145 CAPSULE, GELATIN COATED ORAL at 05:17

## 2023-02-15 RX ADMIN — INSULIN GLARGINE 45 UNITS: 100 INJECTION, SOLUTION SUBCUTANEOUS at 08:27

## 2023-02-15 RX ADMIN — INSULIN GLARGINE 45 UNITS: 100 INJECTION, SOLUTION SUBCUTANEOUS at 21:24

## 2023-02-15 RX ADMIN — DOCUSATE SODIUM 100 MG: 100 CAPSULE, LIQUID FILLED ORAL at 21:20

## 2023-02-15 RX ADMIN — TAMSULOSIN HYDROCHLORIDE 0.4 MG: 0.4 CAPSULE ORAL at 08:22

## 2023-02-15 RX ADMIN — ASPIRIN 81 MG: 81 TABLET, CHEWABLE ORAL at 08:22

## 2023-02-15 RX ADMIN — PANTOPRAZOLE SODIUM 40 MG: 40 TABLET, DELAYED RELEASE ORAL at 05:17

## 2023-02-15 RX ADMIN — Medication 9 MG: at 21:38

## 2023-02-15 RX ADMIN — CARVEDILOL 6.25 MG: 6.25 TABLET, FILM COATED ORAL at 08:23

## 2023-02-15 RX ADMIN — Medication 60 MG: at 08:22

## 2023-02-15 RX ADMIN — SODIUM CHLORIDE, PRESERVATIVE FREE 10 ML: 5 INJECTION INTRAVENOUS at 21:24

## 2023-02-15 RX ADMIN — GABAPENTIN 300 MG: 300 CAPSULE ORAL at 08:21

## 2023-02-15 RX ADMIN — SODIUM BICARBONATE 1300 MG: 650 TABLET ORAL at 08:22

## 2023-02-15 RX ADMIN — INSULIN LISPRO 4 UNITS: 100 INJECTION, SOLUTION INTRAVENOUS; SUBCUTANEOUS at 08:27

## 2023-02-15 NOTE — PROGRESS NOTES
Physical Therapy  Facility/Department: [facility] MED SURG  Daily Treatment Note  NAME: Cornelius Fuentes  : 1960  MRN: [id number]    Date of Service: 2/15/2023    Discharge Recommendations:  Patient would benefit from continued therapy after discharge        Patient Diagnosis(es): The encounter diagnosis was Syncope and collapse. Assessment   Activity Tolerance: Patient limited by fatigue;Patient limited by endurance (Shortness of breathe)     Plan    Physcial Therapy Plan  General Plan: 5-7 times per week  Specific Instructions for Next Treatment: advance to gait as tolerated, continue exercise program and transfers using wheeled walker  Current Treatment Recommendations: Strengthening;Equipment evaluation, education, & procurement;Balance training; Endurance training;Home exercise program;Safety education & training;Functional mobility training;Transfer training;Gait training;Patient/Caregiver education & training; Therapeutic activities     Restrictions  Restrictions/Precautions  Restrictions/Precautions: Fall Risk, Up as Tolerated  Required Braces or Orthoses?: No  Implants present? :  (denies, hx CABG ? sternal wires)     Subjective    Subjective  Subjective: Pt denies pain.   Pain: denies  Orientation  Overall Orientation Status: Within Functional Limits  Orientation Level: Oriented X4  Cognition  Overall Cognitive Status: WFL     Objective   Vitals  SpO2: (!) 83 % (SpO2 dropped after ambulation)  O2 Device: Nasal cannula  Comment: 4L (RN present when pt destated to 80 RN Nilda approved to bump oxygen up to 5 L)  Bed Mobility Training  Bed Mobility Training: Yes  Overall Level of Assistance: Stand-by assistance  Rolling: Stand-by assistance  Supine to Sit: Stand-by assistance  Sit to Supine: Stand-by assistance  Scooting: Stand-by assistance  Balance  Sitting: Without support  Standing: With support  Transfer Training  Transfer Training: Yes  Overall Level of Assistance: Contact-guard assistance  Sit to Stand: Contact-guard assistance  Stand to Sit: Contact-guard assistance  Stand Pivot Transfers: Contact-guard assistance  Bed to Chair: Contact-guard assistance     PT Exercises  A/AROM Exercises: Seated LAQ, ankle pumps x20  Resistive Exercises: hamstring curls and hip abduction with orange tband x20  Static Standing Balance Exercises: 1 min 30 sec x2  Breathing Techniques: Deep breathing in nose out by mouth     Safety Devices  Type of Devices: Nurse notified;Call light within reach; Left in chair;Gait belt       Goals  Short Term Goals  Time Frame for Short Term Goals: 5-7 treatments/ week  Short Term Goal 1: pt to tolerate 1/2 hour of therapuetic exercise and activity  Short Term Goal 2: pt to demonstrate good technique for LE strengthening, balance  and energy conservation  Short Term Goal 3: pt to demonstrate MODIFIED  independent rolling in bed for position change  Short Term Goal 4: pt to demonstrate supine <> sit w/ supervision  Short Term Goal 5: pt to demonstrate sit <> stand and bed <> chair using wheeled walker w/ SBA x 1  Short Term Goal 6: pt to demonstrate gait 30-50' using wheeled walker w/ SBA x 1  Short Term Goal 7: pt to demonstrate good ambulatory balance using wheeled walker  Patient Goals   Patient Goals : therapy at nursing home at [address] [place] [address] - Inpatient   How much help is needed turning from your back to your side while in a flat bed without using bedrails?: A Little  How much help is needed moving from lying on your back to sitting on the side of a flat bed without using bedrails?: A Little  How much help is needed moving to and from a bed to a chair?: A Little  How much help is needed standing up from a chair using your arms?: A Little  How much help is needed walking in hospital room?: A Little  How much help is needed climbing 3-5 steps with a railing?: Total  AM-PAC Inpatient Mobility Raw Score : 16  AM-PAC Inpatient T-Scale Score : 40.78  Mobility Inpatient CMS 0-100% Score: 54.16  Mobility Inpatient CMS G-Code Modifier : CK         Therapy Time   Individual Concurrent Group Co-treatment   Time In 1113         Time Out 1152         Minutes 39                 Electronically signed by Edward Lewis PTA on 2/15/23 at 12:13 PM EST

## 2023-02-15 NOTE — PROGRESS NOTES
NEPHROLOGY PROGRESS NOTE    Reason for consultation: Management of chronic kidney disease stage IV. Consulting physician: Juliane Zarate MD.    Interval history: Patient underwent cardiac catheterization at [facility] [2/14/2023] with finding of wild-type vessel coronary artery disease although LIMA-LAD, SVG-D and SVG-OM1 were patent but she required drug-eluting stent placement in mid right coronary artery. Diuretics were held prior to procedure and she was gently hydrated. Serum creatinine actually improved and is 2.62 mg/dL today. History of Present Illness: This is a 57 y.o. female with history of hypertension, longstanding diabetes mellitus with diabetic nephropathy, Coronary artery disease status post CABG x 3  Chronic CHF with diastolic dysfunction, who presents the ER of [facility] with syncopal episode. Patient has underlying CKD stage IV secondary to diabetic nephropathy. and previous history of dialysis dependency which was  discontinued Aug 2022 due to regaining residual kidney function. She recently was discharged from [facility] on 2/8/2023  after presenting with weight gain and shortness of breath, acute hypoxic respiratory failure secondary to decompensated acute on chronic diastolic heart failure and Hypertensive urgency. Patient was optimally diuresed and  was discharged to [address] [place] [address] on a regimen of Bumex 3 mg twice daily and Metolazone 5 mg every other day. She reports passing out at the [facility] while standing up and attempting to walk across her room yesterday. She has recent onset of lightheadedness 4 days prior . She was  hypotensive and reports two of her  blood pressure medications were held. She  was found to have a UTI isolating  Enterococcus faecalis on 2/7/2022 sensitive to Cipro and commenced on treatment with ciprofloxacin. She denies any flank pain. She has been experiencing dysuria and urgency. No fever, nausea vomiting .  She has mild decrease in oral intake. Serum creatinine had risen to 3.3 mg/dl on 2/7/2023 and on presentation to [facility] on 2/11/2023 creatinine was 3.04 mg/dL. Creatinine today is 2.96 mg/dL with BUN of 65, potassium 3.4 and sodium of 145. She has normal white count and hemoglobin of 10.6  She was previously on dialysis initiated in August 2021 following episode of ATN after contrast exposure. She had a brief period of transient recovery of renal function between February and April 2022 Following which dialysis was restarted due to volume overload resistant to diuretic treatment. 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 She was given a trial off dialysis in August 2022 and has not required dialysis since then. Volume overload has been managed with diuretics. Patient's current baseline creatinine has ranged between  2.6 to 3.0 mg/dL. She had previously followed up with Dr. Cadence Adam and currently receives nephrological care with [facility].     Current Medications:    ticagrelor (BRILINTA) tablet 90 mg, BID  atorvastatin (LIPITOR) tablet 80 mg, Nightly  dextromethorphan (DELSYM) 30 MG/5ML extended release liquid 60 mg, 2 times per day  melatonin tablet 9 mg, Nightly PRN  sodium chloride flush 0.9 % injection 10 mL, PRN  aspirin chewable tablet 81 mg, Daily  docusate sodium (COLACE) capsule 100 mg, BID  linaclotide (LINZESS) capsule 145 mcg, QAM AC  pantoprazole (PROTONIX) tablet 40 mg, QAM AC  tamsulosin (FLOMAX) capsule 0.4 mg, Daily  allopurinol (ZYLOPRIM) tablet 100 mg, Daily  sodium chloride flush 0.9 % injection 5-40 mL, 2 times per day  sodium chloride flush 0.9 % injection 5-40 mL, PRN  0.9 % sodium chloride infusion, PRN  polyethylene glycol (GLYCOLAX) packet 17 g, Daily PRN  acetaminophen (TYLENOL) tablet 650 mg, Q6H PRN   Or  acetaminophen (TYLENOL) suppository 650 mg, Q6H PRN  ondansetron (ZOFRAN-ODT) disintegrating tablet 4 mg, Q8H PRN   Or  ondansetron (ZOFRAN) injection 4 mg, Q6H PRN  heparin (porcine) injection 5,000 Units, 3 times per day  glucose chewable tablet 16 g, PRN  dextrose bolus 10% 125 mL, PRN   Or  dextrose bolus 10% 250 mL, PRN  glucagon (rDNA) injection 1 mg, PRN  dextrose 10 % infusion, Continuous PRN  gabapentin (NEURONTIN) capsule 300 mg, Daily  sodium bicarbonate tablet 1,300 mg, BID  carvedilol (COREG) tablet 6.25 mg, BID  amLODIPine (NORVASC) tablet 5 mg, Daily  insulin glargine (LANTUS) injection vial 45 Units, BID  insulin lispro (HUMALOG) injection vial 0-16 Units, TID WC  insulin lispro (HUMALOG) injection vial 0-4 Units, Nightly  benzonatate (TESSALON) capsule 100 mg, TID PRN  [Held by provider] bumetanide (BUMEX) tablet 2 mg, BID    Objective:  CURRENT TEMPERATURE:  Temp: 98.2 °F (36.8 °C)  MAXIMUM TEMPERATURE OVER 24HRS:  Temp (24hrs), Av.5 °F (36.4 °C), Min:97 °F (36.1 °C), Max:98.2 °F (36.8 °C)    CURRENT RESPIRATORY RATE:  Resp: 17  CURRENT PULSE:  Heart Rate: 57  CURRENT BLOOD PRESSURE:  BP: (!) 139/55  24HR BLOOD PRESSURE RANGE:  Systolic (79OCA), HIS:205 , Min:110 , HBQ:102   ; Diastolic (60WDW), XJL:14, Min:41, Max:55    24HR INTAKE/OUTPUT:    Intake/Output Summary (Last 24 hours) at 2/15/2023 1258  Last data filed at 2023 1800  Gross per 24 hour   Intake --   Output 350 ml   Net -350 ml       Patient Vitals for the past 96 hrs (Last 3 readings):   Weight   23 248 lb (112.5 kg)       Physical Exam:  GENERAL APPEARANCE: Alert and cooperative, and appears to be in no acute distress. HEAD: normocephalic  EYES: PERRL, EOMI. Not pale, anicteric   NOSE:  No nasal discharge. THROAT:  Oral cavity and pharynx normal. Moist  NECK: Neck supple, non-tender without lymphadenopathy, masses or thyromegaly. JVD-neg  CARDIAC: Normal S1 and S2. No S3, S4 or murmurs. Rhythm is regular.   LUNGS: Clear to auscultation and percussion without rales, rhonchi, wheezing or diminished breath sounds. ABDOMEN: soft non distended, BS+ Non tender no organomegally  MUSKULOSKELETAL: Adequately aligned spine. No joint erythema or tenderness. EXTREMITIES: No edema. Peripheral pulses intact.    NEURO:Alert oriented x 3 ,power 5/5 in all extremities    Labs:   CBC:  Recent Labs     02/13/23  0639 02/14/23  0544 02/15/23  0724   WBC 6.0 5.1 6.2   RBC 3.46* 3.64* 3.67*   HGB 10.9* 11.3* 11.7*   HCT 32.7* 34.5* 34.4*   MCV 94.7 94.7 93.8   MCH 31.4 31.1 31.8   MCHC 33.2 32.9 33.9   RDW 15.1* 15.0* 14.6    174 190   MPV 8.9 9.0 9.2        BMP:   Recent Labs     02/13/23  0724 02/14/23  0544 02/15/23  0724    142 141   K 3.7 3.8 3.7    98 100   CO2 32* 31 31   BUN 70* 70* 60*   CREATININE 3.12* 3.01* 2.62*   GLUCOSE 212* 269* 262*   CALCIUM 8.8 8.9 8.9        IRON:    Lab Results   Component Value Date/Time    IRON 83 09/27/2022 01:32 PM     TIBC:    Lab Results   Component Value Date/Time    TIBC 217 09/27/2022 01:32 PM     FERRITIN:    Lab Results   Component Value Date/Time    FERRITIN 117 09/27/2022 01:32 PM     SPEP:   Lab Results   Component Value Date/Time    PROT 6.4 02/07/2023 05:45 AM     UPEP:   Lab Results   Component Value Date/Time    TPU 20 11/12/2021 10:30 AM      Urine Sodium:    Lab Results   Component Value Date/Time    JASON 47 11/14/2021 02:04 AM      Urine Creatinine:    Lab Results   Component Value Date/Time    LABCREA 65.4 01/26/2023 01:10 PM     Urine Protein:    Lab Results   Component Value Date/Time    TPU 20 11/12/2021 10:30 AM     Urinalysis:  U/A:   Lab Results   Component Value Date/Time    NITRU NEGATIVE 02/07/2023 03:00 PM    COLORU Yellow 02/07/2023 03:00 PM    PHUR 5.5 02/07/2023 03:00 PM    WBCUA 2 TO 5 02/07/2023 03:00 PM    RBCUA None 02/07/2023 03:00 PM    MUCUS 1+ 02/07/2023 03:00 PM    TRICHOMONAS NOT REPORTED 11/14/2021 02:05 AM    YEAST FEW 02/07/2023 03:00 PM    BACTERIA MANY 02/07/2023 03:00 PM    SPECGRAV 1.016 02/07/2023 03:00 PM    LEUKOCYTESUR SMALL 02/07/2023 03:00 PM    UROBILINOGEN Normal 02/07/2023 03:00 PM    BILIRUBINUR NEGATIVE 02/07/2023 03:00 PM    GLUCOSEU NEGATIVE 02/07/2023 03:00 PM    KETUA NEGATIVE 02/07/2023 03:00 PM    AMORPHOUS 2+ 07/31/2022 12:00 PM     Assessment/plan:    1. Chronic kidney disease stage IV [baseline serum creatinine 2.6 to 3.0 mg/dL over the past few months] - secondary to diabetic nephropathy. Renal function is stable and there is no evidence of contrast nephropathy at this time. Plan: Discontinue IV fluid. Restart diuretics. Check basic metabolic profile daily. 2.  Systemic hypertension - blood pressure is adequately controlled. 3.  Syncope associated with orthostasis. Clinically improved. 4.  Coronary artery disease s/p CABG - Patient required treatment of drug eluting stent into right coronary artery 2/14/2023.    5.  Enterococcus faecalis urinary tract infection (2/7/2023) - Completed course of IV ceftriaxone with repeat blood culture negative on 2/13/2023. Prognosis is guarded.     Electronically signed by Bev Melgar MD on 2/15/2023 at 12:58 PM

## 2023-02-15 NOTE — PROGRESS NOTES
[facility]   INPATIENT OCCUPATIONAL THERAPY  PROGRESS NOTE  Date: 2/15/2023  Patient Name: Ian Max       Room: 4993/8839-28  MRN: [id number]    : 1960  (57 y.o.)  Gender: female   Referring Practitioner: Ronda Joseph MD  Diagnosis: Syncope and collapse    Restrictions/Precautions  Restrictions/Precautions  Restrictions/Precautions: Fall Risk;Up as Tolerated  Required Braces or Orthoses?: No    Vitals  O2 Device: Nasal cannula  Comment: SOB with min exertion requiring frequent RB. O2 sats monitored during activity and ranges 88%-92%. 5L O2    Subjective  Subjective  Subjective: pt states that she fell at [address] after passing out. pt becomes SOB with min exertion requiring frequent RB. O2 sats monitored during activity and ranges 88%-92%. 5L O2  Subjective  Subjective: Pt denies pain. Comments: DEDE Le ok'd pt for OT treat. Pt agreeable to participate and pleasant/cooperative throughout. Objective  Orientation  Overall Orientation Status: Within Functional Limits  Orientation Level: Oriented X4  Cognition  Overall Cognitive Status: WFL    Activities of Daily Living  ADL  Equipment Provided: Reacher, Long-handled sponge  Feeding: Setup  Grooming: Setup  UE Bathing: Setup  LE Bathing: Minimal assistance  UE Dressing: Stand by assistance  LE Dressing: Moderate assistance  Toileting: Stand by assistance  Toileting Skilled Clinical Factors: per pt report completed earlier this morning. Additional Comments: ADL scores based on skilled observation and clinical reasoning unless otherwise noted. Pt is currently limited by dizziness, balance, strength, and endurance impacting performance in self care tasks.       Balance  Balance  Sitting Balance: Stand by assistance   Standing Balance: Stand by assistance  Standing Balance  Time: 90 seconds x 2  Activity: dynamic stand activity    Transfers/Mobility  Transfers  Sit to stand: Stand by assistance  Stand to sit: Stand by assistance  Transfer Comments: good hand placement noted. OT Exercises  Dynamic Sitting Balance Exercises: pt engages in dynamic reach activity to increase safety and endurance during functional tasks. eg reaching high/low and crossing midline outside ELISABET.  no LOB noted but limited to 90seconds x 2 due to SOB      Patient Education  Patient Education  Education Given To: Patient  Education Provided: Role of Therapy, Plan of Care, Transfer Training (pursed lip breathing, EC during ADLs)  Education Method: Verbal  Barriers to Learning: None  Education Outcome: Verbalized understanding, Continued education needed      Goals  Short Term Goals  Time Frame for Short Term Goals: By discharge, pt will  Short Term Goal 1: perform functional transfers/mobility during self care tasks with SBA, least restrictive device, and Good safety  Short Term Goal 2: be educated on and explore use of DME/AE to increase ease and independence during ADLs  Short Term Goal 3: perform lower body ADLs with CGA and adaptive equipment/techs as needed  Short Term Goal 4: tolerate standing and reaching for 6+ minutes with SBA and no LOB during functional activity  Short Term Goal 5: actively participate in 15+ minutes of therapeutic exercise/functional activity to increase overall strength/endurance needed for ADLs  Occupational Therapy Plan  Times Per Week: 4-6  Current Treatment Recommendations: Strengthening, Functional mobility training, Balance training, Endurance training, Pain management, Safety education & training, Equipment evaluation, education, & procurement, Patient/Caregiver education & training, Self-Care / ADL, Home management training      Assessment  Activity Tolerance  Activity Tolerance: Treatment limited secondary to medical complications (free text) (pt limited 2* SOB)  Assessment  Performance deficits / Impairments: Decreased functional mobility , Decreased ADL status, Decreased strength, Decreased endurance, Decreased balance, Decreased high-level IADLs, Decreased safe awareness  Treatment Diagnosis: impaired self care status  Prognosis: Good  Decision Making: Medium Complexity  Discharge Recommendations: Patient would benefit from continued therapy after discharge  OT Equipment Recommendations  Other: TBD  Safety Devices  Type of Devices: Nurse notified, Call light within reach, Left in chair, Gait belt      AM-PAC Daily Activities Inpatient  AM-PAC Daily Activity - Inpatient   How much help is needed for putting on and taking off regular lower body clothing?: A Lot  How much help is needed for bathing (which includes washing, rinsing, drying)?: A Little  How much help is needed for toileting (which includes using toilet, bedpan, or urinal)?: A Little  How much help is needed for putting on and taking off regular upper body clothing?: A Little  How much help is needed for taking care of personal grooming?: A Little  How much help for eating meals?: A Little  AM-PAC Inpatient Daily Activity Raw Score: 17  AM-PAC Inpatient ADL T-Scale Score : 37.26  ADL Inpatient CMS 0-100% Score: 50.11  ADL Inpatient CMS G-Code Modifier : CK    OT Minutes  OT Individual Minutes  Time In: [address]  Time Out: [address] [facility] [employer]  Minutes: [address], [place]

## 2023-02-15 NOTE — PROGRESS NOTES
Patient known to writer from previous admissions; patient friendly and welcoming but was unable to stay awake during visit; listening presence and support; welcomed prayer     02/15/23 2204   Encounter Summary   Encounter Overview/Reason  Spiritual/Emotional Needs   Service Provided For: Patient   Referral/Consult From: Patient;Nurse   Last Encounter  02/15/23   Complexity of Encounter Moderate   Spiritual/Emotional needs   Type Spiritual Support   Grief, Loss, and Adjustments   Type Adjustment to illness   Assessment/Intervention/Outcome   Assessment Coping; Impaired resilience; Impaired social interaction; Powerlessness   Intervention Discussed illness injury and its impact; Explored/Affirmed feelings, thoughts, concerns;Prayer (assurance of)/[place];Sustaining Presence/Ministry of presence   Outcome Engaged in conversation;Expressed feelings, needs, and concerns;Receptive; Expressed Gratitude

## 2023-02-15 NOTE — CARE COORDINATION
Authorization submitted for [employer]. Nilo Peters 2/13.     Electronically signed by Arlene Snellen, LSW on 2/15/2023 at 9:55 AM

## 2023-02-15 NOTE — PROGRESS NOTES
[facility]   Progress Note                   Date:   2/15/2023  Patient name: Andrew Durbin  Date of admission:  2/11/2023  7:52 PM  MRN:   [id number]  YOB: 1960  PCP: AFSANEH Pollard CNP    Reason for Admission:      Subjective:       Clinical Changes / Abnormalities:   Seen and examined. No cp. Has some SOB. S/p Cath on 2/14/23- with PCI. Medications:   Scheduled Meds:   furosemide  40 mg IntraVENous Once    ticagrelor  90 mg Oral BID    atorvastatin  80 mg Oral Nightly    dextromethorphan  60 mg Oral 2 times per day    aspirin  81 mg Oral Daily    docusate sodium  100 mg Oral BID    linaclotide  145 mcg Oral QAM AC    pantoprazole  40 mg Oral QAM AC    tamsulosin  0.4 mg Oral Daily    allopurinol  100 mg Oral Daily    sodium chloride flush  5-40 mL IntraVENous 2 times per day    heparin (porcine)  5,000 Units SubCUTAneous 3 times per day    gabapentin  300 mg Oral Daily    sodium bicarbonate  1,300 mg Oral BID    carvedilol  6.25 mg Oral BID    amLODIPine  5 mg Oral Daily    insulin glargine  45 Units SubCUTAneous BID    insulin lispro  0-16 Units SubCUTAneous TID WC    insulin lispro  0-4 Units SubCUTAneous Nightly    cefTRIAXone (ROCEPHIN) IV  1,000 mg IntraVENous Q24H    [Held by provider] bumetanide  2 mg Oral BID     Continuous Infusions:   sodium chloride      dextrose       CBC:   Recent Labs     02/13/23  0639 02/14/23  0544 02/15/23  0724   WBC 6.0 5.1 6.2   HGB 10.9* 11.3* 11.7*    174 190       BMP:    Recent Labs     02/13/23  0724 02/14/23  0544 02/15/23  0724    142 141   K 3.7 3.8 3.7    98 100   CO2 32* 31 31   BUN 70* 70* 60*   CREATININE 3.12* 3.01* 2.62*   GLUCOSE 212* 269* 262*       Hepatic: No results for input(s): AST, ALT, ALB, BILITOT, ALKPHOS in the last 72 hours. Troponin: No results for input(s): TROPONINI in the last 72 hours. BNP: No results for input(s): BNP in the last 72 hours.   Lipids: No results for input(s): CHOL, HDL in the last 72 hours. Invalid input(s): LDLCALCU  INR: No results for input(s): INR in the last 72 hours. Objective:   Vitals: BP (!) 139/55   Pulse 57   Temp 98.2 °F (36.8 °C)   Resp 17   Wt 248 lb (112.5 kg)   SpO2 94%   BMI 41.27 kg/m²   I/O last 3 completed shifts:  In: -   Out: 350 [Urine:350]  No intake/output data recorded.   Scheduled Meds:   furosemide  40 mg IntraVENous Once    ticagrelor  90 mg Oral BID    atorvastatin  80 mg Oral Nightly    dextromethorphan  60 mg Oral 2 times per day    aspirin  81 mg Oral Daily    docusate sodium  100 mg Oral BID    linaclotide  145 mcg Oral QAM AC    pantoprazole  40 mg Oral QAM AC    tamsulosin  0.4 mg Oral Daily    allopurinol  100 mg Oral Daily    sodium chloride flush  5-40 mL IntraVENous 2 times per day    heparin (porcine)  5,000 Units SubCUTAneous 3 times per day    gabapentin  300 mg Oral Daily    sodium bicarbonate  1,300 mg Oral BID    carvedilol  6.25 mg Oral BID    amLODIPine  5 mg Oral Daily    insulin glargine  45 Units SubCUTAneous BID    insulin lispro  0-16 Units SubCUTAneous TID WC    insulin lispro  0-4 Units SubCUTAneous Nightly    cefTRIAXone (ROCEPHIN) IV  1,000 mg IntraVENous Q24H    [Held by provider] bumetanide  2 mg Oral BID     Continuous Infusions:   sodium chloride      dextrose       PRN Meds:.melatonin, sodium chloride flush, sodium chloride flush, sodium chloride, polyethylene glycol, acetaminophen **OR** acetaminophen, ondansetron **OR** ondansetron, glucose, dextrose bolus **OR** dextrose bolus, glucagon (rDNA), dextrose, benzonatate    CONSTITUTIONAL: AOx4, no apparent distress, appears stated age   HEAD: normocephalic, atraumatic   EYES: PERRLA, EOMI   ENT: moist mucous membranes, uvula midline   NECK: symmetric, no midline tenderness to palpation   LUNGS: clear to auscultation bilaterally   CARDIOVASCULAR: regular rate and rhythm, no murmurs, rubs or gallops   ABDOMEN: Groin soft, no hematoma, normal pulse in R Femoral. Soft, non-tender, non-distended with normal active bowel sounds   SKIN: no rash       EKG: NSR non-specific T changes QTc 495 ms     ECHO: 1/27/23  Summary  Global left ventricular systolic function is normal.  Estimated LVEF 50-55%. Mild concentric left ventricular hypertrophy. Normal right ventricular size and function. No significant valvular regurgitation or stenosis seen. No pericardial effusion seen. Stress Test: 2/13/23  Impression       Large infarct in the lateral wall extending into the cardiac apex with some   stalin-infarct ischemia in anterolateral wall. LVEF normal.       Risk stratification: High risk patient has no prior history of evidence of   MI. Low risk if there is known prior history. Cath on 2/14/23:  Findings:  Cardiac Arteries and Lesion Findings  LMCA: has 80% diffuse disease. LAD: has proximal 100% occlusion. LIMA-LAD is patent. SVG-D is patent. LCx: has proximal 100% occlusion. SVG-OM 1 is patent with severe diffuse disease post anastomosis of  SVG-OM is known and not amenable to PCI. RCA: has proximal patent stent with mid 85% stenosis. RPDA has diffuse 80% stenosis but is a small  vessel. RPL is normal.  Lesion on Mid RCA: Mid subsection. 85% stenosis 20 mm length reduced to 0%. Pre procedure  ABEL III flow was noted. Post Procedure ABEL III flow was present. Good runoff was present. The lesion  was diagnosed as High Risk (C). The lesion was discrete and eccentric. The lesion showed with  irregular contour, mild angulation and mild tortuosity. Devices used  Capital One Flex 180 cm. Number of passes: 1.   Euphora Balloon 2.5mm x 15mm. 3 inflation(s) to a max pressure of: 20 jayne.  Christiano [place] 2.75x22. 1 inflation(s) to a max pressure of: 20 jayne.  NC Euphora Balloon 3.0mm x 20mm. 2 inflation(s) to a max pressure of: 20 jayne      Conclusions:     Multivessel coronary artery disease. Patent LIMA-LAD, SVG-D and SVG-OM1.  Severe diffuse disease post anastomosis of SVG-OM is known  and not amenable to PCI. Patent proximal RCA stent with new mid severe stenosis. RPDA has diffuse severe stenosis but small for  PCI. Successful PTCA/FADIA of mid RCA. Total contrast used 70ml. Assessment / Acute Cardiac Problems:   - Syncope- ? Due to hypotension  - CAD- CABG- s/p PCI to M RCA Cath 2/14/23- patent LIMA-LAD, SVG-D, SVG-OM1 patent with severe disease not amenable to PCI. RCA had 85% stenosis- s/p PCI.   - HFpEF  - Bradycardia  - Advanced CKD was on HD prior  - H/o DVT  - Recent echo reviewed  - H/o DM2  - SOB- ? Fluid overload (bumex was on hold and she go IVF prior to cath)    Plan of Treatment:   - give 1 time dose of IV lasix  - check CXR  - SOB maybe due to Brilinta  - continue ASA, statin, Brilinta, norvasc, Coreg.   - nephro to see- ?  Restart po bumex      Live Pedroza MD  [facility]  [phone]

## 2023-02-15 NOTE — PROGRESS NOTES
02/15/23 1458   Encounter Summary   Encounter Overview/Reason  Volunteer Encounter   Service Provided For: Patient   Referral/Consult From: Rounding   Last Encounter  02/15/23  (V)   Complexity of Encounter Low   Spiritual/Emotional needs   Type Spiritual Support   Rituals, Rites and [address]

## 2023-02-15 NOTE — PROGRESS NOTES
FAMILY MEDICINE  - PROGRESS NOTE    Date:  2/15/2023  Celio Martínez  [id number]      Chief Complaint   Patient presents with    Loss of Consciousness         Interval History:  not changed, no significant events overnight. She is complaining of shortness of breath & dyspnea this am. She had a cardiac cath with stent placement done yesterday. Specialists notes, labs & imaging reviewed.       Subjective  Constitutional: positive for obesity  Genitourinary:positive for dysuria and frequency  Neurological: positive for syncope  Endocrine: positive for diabetic symptoms including hyperglycemia :    Objective:    BP (!) 110/41   Pulse 61   Temp 97 °F (36.1 °C) (Axillary)   Resp 17   Wt 248 lb (112.5 kg)   SpO2 97%   BMI 41.27 kg/m²   General appearance - overweight  Mental status - drowsy  Eyes - not examined  Ears - hearing grossly normal bilaterally  Nose - normal and patent, no erythema, discharge or polyps  Mouth - mucous membranes moist, pharynx normal without lesions  Neck - supple, no significant adenopathy  Lymphatics - no palpable lymphadenopathy, no hepatosplenomegaly  Chest - clear to auscultation, no wheezes, rales or rhonchi, symmetric air entry  Heart - normal rate, regular rhythm, normal S1, S2, no murmurs, rubs, clicks or gallops  Abdomen - soft, nontender, nondistended, no masses or organomegaly  Breasts - not examined  Back exam - not examined  Neurological - neck supple without rigidity  Musculoskeletal - no joint tenderness, deformity or swelling  Extremities - peripheral pulses normal, no pedal edema, no clubbing or cyanosis  Skin - normal coloration and turgor, no rashes, no suspicious skin lesions noted    Data:   Medications:   Current Facility-Administered Medications   Medication Dose Route Frequency Provider Last Rate Last Admin    0.9 % sodium chloride infusion   IntraVENous Continuous Georgi Rhodes MD 40 mL/hr at 02/14/23 3670 New Bag at 02/14/23 1657    ticagrelor (BRILINTA) tablet 90 mg  90 mg Oral BID Rustam Sterling, DO   90 mg at 02/14/23 2152    atorvastatin (LIPITOR) tablet 80 mg  80 mg Oral Nightly Rustam Sterling, DO   80 mg at 02/14/23 2152    dextromethorphan (DELSYM) 30 MG/5ML extended release liquid 60 mg  60 mg Oral 2 times per day Lubna So MD        melatonin tablet 9 mg  9 mg Oral Nightly PRN Karen Du MD   9 mg at 02/14/23 2152    sodium chloride flush 0.9 % injection 10 mL  10 mL IntraVENous PRN Rustam Sterling, DO   10 mL at 02/13/23 1318    aspirin chewable tablet 81 mg  81 mg Oral Daily Karen Du MD   81 mg at 02/14/23 0914    docusate sodium (COLACE) capsule 100 mg  100 mg Oral BID Karen Du MD   100 mg at 02/14/23 1955    linaclotide (LINZESS) capsule 145 mcg  145 mcg Oral QAM AC Karen Du MD   145 mcg at 02/15/23 0517    pantoprazole (PROTONIX) tablet 40 mg  40 mg Oral QAM AC Karen Du MD   40 mg at 02/15/23 0517    tamsulosin (FLOMAX) capsule 0.4 mg  0.4 mg Oral Daily Karen Du MD   0.4 mg at 02/14/23 0914    allopurinol (ZYLOPRIM) tablet 100 mg  100 mg Oral Daily Karen Du MD   100 mg at 02/14/23 0914    sodium chloride flush 0.9 % injection 5-40 mL  5-40 mL IntraVENous 2 times per day Karen Du MD   10 mL at 02/14/23 1955    sodium chloride flush 0.9 % injection 5-40 mL  5-40 mL IntraVENous PRN Karen Du MD        0.9 % sodium chloride infusion   IntraVENous PRN Karen Du MD        polyethylene glycol (GLYCOLAX) packet 17 g  17 g Oral Daily PRN Karen Du MD        acetaminophen (TYLENOL) tablet 650 mg  650 mg Oral Q6H PRN Karen Du MD   650 mg at 02/14/23 1648    Or    acetaminophen (TYLENOL) suppository 650 mg  650 mg Rectal Q6H PRN Karen Du MD        ondansetron (ZOFRAN-ODT) disintegrating tablet 4 mg  4 mg Oral Q8H PRN Karen Du MD        Or    ondansetron (ZOFRAN) injection 4 mg  4 mg IntraVENous Q6H PRN Karen Du, MD        heparin (porcine) injection 5,000 Units  5,000 Units SubCUTAneous 3 times per day Kris Bourne MD   5,000 Units at 02/15/23 0517    glucose chewable tablet 16 g  4 tablet Oral PRN Kris Bourne MD        dextrose bolus 10% 125 mL  125 mL IntraVENous PRN Kris Bourne MD        Or    dextrose bolus 10% 250 mL  250 mL IntraVENous PRN Kris Bourne MD        glucagon (rDNA) injection 1 mg  1 mg SubCUTAneous PRN Kris Bourne MD        dextrose 10 % infusion   IntraVENous Continuous PRN Kris Bourne MD        gabapentin (NEURONTIN) capsule 300 mg  300 mg Oral Daily Kris Bourne MD   300 mg at 02/14/23 0914    sodium bicarbonate tablet 1,300 mg  1,300 mg Oral BID Kris Bourne MD   1,300 mg at 02/14/23 1955    carvedilol (COREG) tablet 6.25 mg  6.25 mg Oral BID Rustam Sterling, DO   6.25 mg at 02/14/23 1955    amLODIPine (NORVASC) tablet 5 mg  5 mg Oral Daily Rustam Sterling, DO   5 mg at 02/14/23 0914    insulin glargine (LANTUS) injection vial 45 Units  45 Units SubCUTAneous BID Kris Bourne MD   45 Units at 02/14/23 2153    insulin lispro (HUMALOG) injection vial 0-16 Units  0-16 Units SubCUTAneous TID WC Kris Bourne MD   8 Units at 02/14/23 1650    insulin lispro (HUMALOG) injection vial 0-4 Units  0-4 Units SubCUTAneous Nightly Kris Bourne MD   4 Units at 02/14/23 2153    benzonatate (TESSALON) capsule 100 mg  100 mg Oral TID PRN Kris Bourne MD   100 mg at 02/15/23 0415    cefTRIAXone (ROCEPHIN) 1,000 mg in sodium chloride 0.9 % 50 mL IVPB (mini-bag)  1,000 mg IntraVENous Q24H Kris Bourne MD   Stopped at 02/14/23 1747    bumetanide (BUMEX) tablet 2 mg  2 mg Oral BID Kris Bourne MD   2 mg at 02/13/23 2034       Intake/Output Summary (Last 24 hours) at 2/15/2023 6659  Last data filed at 2/14/2023 1800  Gross per 24 hour   Intake --   Output 350 ml   Net -350 ml       Recent Results (from the past 24 hour(s))   POC Glucose Fingerstick    Collection Time: 02/14/23 6:08 AM   Result Value Ref Range    POC Glucose 267 (H) 65 - 105 mg/dL   POC Glucose Fingerstick    Collection Time: 02/14/23  7:52 AM   Result Value Ref Range    POC Glucose 295 (H) 65 - 105 mg/dL   POC Glucose Fingerstick    Collection Time: 02/14/23  4:35 PM   Result Value Ref Range    POC Glucose 262 (H) 65 - 105 mg/dL   POC Glucose Fingerstick    Collection Time: 02/14/23  8:03 PM   Result Value Ref Range    POC Glucose 355 (H) 65 - 105 mg/dL     -----------------------------------------------------------------  RAD:  EKG:  Micro:     Assessment & Plan:    Patient Active Problem List:     Atherosclerosis of coronary artery bypass graft of native heart without angina pectoris     Acute kidney injury superimposed on CKD ([facility])     Acute on chronic diastolic heart failure ([facility])     Diabetic polyneuropathy associated with type 2 diabetes mellitus ([facility] [address]     History of coronary artery bypass graft     Iron deficiency anemia     Spinal stenosis of lumbar region with neurogenic claudication     Mixed hyperlipidemia     CKD (chronic kidney disease) stage 4, GFR 15-29 ml/min ([facility])     Type 2 diabetes 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, with long-term current use of insulin ([facility])     Obesity, Class II, BMI 35-39.9     Thyroid nodule greater than or equal to 1 cm in diameter incidentally noted on imaging study     Hypertension, essential     Chronic ischemic heart disease     Ischemic stroke of frontal lobe ([facility])     Morbid obesity with BMI of 40.0-44.9, adult ([facility])     Disequilibrium syndrome     Anxiety     Chronic midline low back pain with bilateral sciatica     COVID     Cerebral hypoperfusion     Immature arteriovenous fistula ([facility])     History of fusion of cervical spine     Depression with anxiety     Vancomycin resistant Enterococcus UTI     Dialysis disequilibrium syndrome     Acute cystitis without hematuria     VRE infection (vancomycin resistant Enterococcus)     Infection due to ESBL-producing Klebsiella pneumoniae     Class 2 severe obesity due to excess calories with serious comorbidity and body mass index (BMI) of 35.0 to 35.9 in adult [facility])     Type 2 diabetes mellitus with hyperglycemia, with long-term current use of insulin (HCC)     Right hemiparesis (HCC)     Ulcer of left foot, limited to breakdown of skin ([address]     Secondary hyperparathyroidism ([facility] [address]     Hypertensive urgency     Hypoxia     Congestive heart failure (HCC)     Nephrotic range proteinuria     Acute respiratory failure with hypoxia (HCC)     Syncope and collapse     Subacute cough           Plan:  -Syncope and collapse - Cardiology consulted, s/p cardiac cath with stent placement yesterday.  -Dyspnea - will check repeat CXR. -UTI - cx showed no growth, IV Rocephin D/C'd.  -CKD4 - Nephrology consulted. -Disequilibrium syndrome - Neurology consulted. -Type 2 DM - hyperglycemia, will resume Lantus and SS coverage with a carb control diet.  -Continue current treatments. -D/C planning ongoing.  -Complete orders per chart.     See orders   Disposition:    Electronically signed by Lalito Sage MD on 2/15/2023 at 6:07 AM

## 2023-02-16 ENCOUNTER — APPOINTMENT (OUTPATIENT)
Dept: VASCULAR LAB | Age: 63
DRG: 247 | End: 2023-02-16
Payer: COMMERCIAL

## 2023-02-16 ENCOUNTER — APPOINTMENT (OUTPATIENT)
Dept: CT IMAGING | Age: 63
DRG: 247 | End: 2023-02-16
Payer: COMMERCIAL

## 2023-02-16 LAB
ABSOLUTE EOS #: 0.3 K/UL (ref 0–0.4)
ABSOLUTE LYMPH #: 0.7 K/UL (ref 1–4.8)
ABSOLUTE MONO #: 0.4 K/UL (ref 0.1–1.3)
ANION GAP SERPL CALCULATED.3IONS-SCNC: 12 MMOL/L (ref 9–17)
BASOPHILS # BLD: 1 % (ref 0–2)
BASOPHILS ABSOLUTE: 0.1 K/UL (ref 0–0.2)
BUN SERPL-MCNC: 59 MG/DL (ref 8–23)
CALCIUM SERPL-MCNC: 9.1 MG/DL (ref 8.6–10.4)
CHLORIDE SERPL-SCNC: 101 MMOL/L (ref 98–107)
CO2 SERPL-SCNC: 30 MMOL/L (ref 20–31)
CREAT SERPL-MCNC: 2.74 MG/DL (ref 0.5–0.9)
EOSINOPHILS RELATIVE PERCENT: 4 % (ref 0–4)
GFR SERPL CREATININE-BSD FRML MDRD: 19 ML/MIN/1.73M2
GLUCOSE BLD-MCNC: 134 MG/DL (ref 65–105)
GLUCOSE BLD-MCNC: 196 MG/DL (ref 65–105)
GLUCOSE BLD-MCNC: 216 MG/DL (ref 65–105)
GLUCOSE BLD-MCNC: 260 MG/DL (ref 65–105)
GLUCOSE SERPL-MCNC: 139 MG/DL (ref 70–99)
HCT VFR BLD AUTO: 33.2 % (ref 36–46)
HGB BLD-MCNC: 11.1 G/DL (ref 12–16)
LYMPHOCYTES # BLD: 12 % (ref 24–44)
MCH RBC QN AUTO: 31.7 PG (ref 26–34)
MCHC RBC AUTO-ENTMCNC: 33.6 G/DL (ref 31–37)
MCV RBC AUTO: 94.3 FL (ref 80–100)
MONOCYTES # BLD: 7 % (ref 1–7)
PDW BLD-RTO: 14.9 % (ref 11.5–14.9)
PLATELET # BLD AUTO: 173 K/UL (ref 150–450)
PMV BLD AUTO: 9 FL (ref 6–12)
POTASSIUM SERPL-SCNC: 3.8 MMOL/L (ref 3.7–5.3)
RBC # BLD: 3.52 M/UL (ref 4–5.2)
SEG NEUTROPHILS: 76 % (ref 36–66)
SEGMENTED NEUTROPHILS ABSOLUTE COUNT: 4.2 K/UL (ref 1.3–9.1)
SODIUM SERPL-SCNC: 143 MMOL/L (ref 135–144)
WBC # BLD AUTO: 5.7 K/UL (ref 3.5–11)

## 2023-02-16 PROCEDURE — 6370000000 HC RX 637 (ALT 250 FOR IP): Performed by: STUDENT IN AN ORGANIZED HEALTH CARE EDUCATION/TRAINING PROGRAM

## 2023-02-16 PROCEDURE — 82947 ASSAY GLUCOSE BLOOD QUANT: CPT

## 2023-02-16 PROCEDURE — 2580000003 HC RX 258: Performed by: STUDENT IN AN ORGANIZED HEALTH CARE EDUCATION/TRAINING PROGRAM

## 2023-02-16 PROCEDURE — 1200000000 HC SEMI PRIVATE

## 2023-02-16 PROCEDURE — 6370000000 HC RX 637 (ALT 250 FOR IP): Performed by: FAMILY MEDICINE

## 2023-02-16 PROCEDURE — 6360000002 HC RX W HCPCS: Performed by: STUDENT IN AN ORGANIZED HEALTH CARE EDUCATION/TRAINING PROGRAM

## 2023-02-16 PROCEDURE — 94667 MNPJ CHEST WALL 1ST: CPT

## 2023-02-16 PROCEDURE — 85025 COMPLETE CBC W/AUTO DIFF WBC: CPT

## 2023-02-16 PROCEDURE — 2700000000 HC OXYGEN THERAPY PER DAY

## 2023-02-16 PROCEDURE — 71250 CT THORAX DX C-: CPT

## 2023-02-16 PROCEDURE — 99232 SBSQ HOSP IP/OBS MODERATE 35: CPT | Performed by: FAMILY MEDICINE

## 2023-02-16 PROCEDURE — 94761 N-INVAS EAR/PLS OXIMETRY MLT: CPT

## 2023-02-16 PROCEDURE — 80048 BASIC METABOLIC PNL TOTAL CA: CPT

## 2023-02-16 PROCEDURE — 94664 DEMO&/EVAL PT USE INHALER: CPT

## 2023-02-16 PROCEDURE — 97116 GAIT TRAINING THERAPY: CPT

## 2023-02-16 PROCEDURE — 97110 THERAPEUTIC EXERCISES: CPT

## 2023-02-16 PROCEDURE — 36415 COLL VENOUS BLD VENIPUNCTURE: CPT

## 2023-02-16 PROCEDURE — 6370000000 HC RX 637 (ALT 250 FOR IP): Performed by: INTERNAL MEDICINE

## 2023-02-16 PROCEDURE — 93970 EXTREMITY STUDY: CPT

## 2023-02-16 PROCEDURE — 97530 THERAPEUTIC ACTIVITIES: CPT

## 2023-02-16 RX ORDER — BENZONATATE 200 MG/1
200 CAPSULE ORAL 3 TIMES DAILY
Status: DISCONTINUED | OUTPATIENT
Start: 2023-02-16 | End: 2023-02-17 | Stop reason: HOSPADM

## 2023-02-16 RX ADMIN — Medication 60 MG: at 08:01

## 2023-02-16 RX ADMIN — INSULIN GLARGINE 45 UNITS: 100 INJECTION, SOLUTION SUBCUTANEOUS at 21:04

## 2023-02-16 RX ADMIN — BENZONATATE 200 MG: 200 CAPSULE ORAL at 20:46

## 2023-02-16 RX ADMIN — HEPARIN SODIUM 5000 UNITS: 5000 INJECTION INTRAVENOUS; SUBCUTANEOUS at 05:31

## 2023-02-16 RX ADMIN — PANTOPRAZOLE SODIUM 40 MG: 40 TABLET, DELAYED RELEASE ORAL at 05:31

## 2023-02-16 RX ADMIN — BUMETANIDE 1 MG: 1 TABLET ORAL at 07:59

## 2023-02-16 RX ADMIN — SODIUM CHLORIDE, PRESERVATIVE FREE 5 ML: 5 INJECTION INTRAVENOUS at 21:00

## 2023-02-16 RX ADMIN — SODIUM CHLORIDE, PRESERVATIVE FREE 10 ML: 5 INJECTION INTRAVENOUS at 08:02

## 2023-02-16 RX ADMIN — DOCUSATE SODIUM 100 MG: 100 CAPSULE, LIQUID FILLED ORAL at 20:45

## 2023-02-16 RX ADMIN — ATORVASTATIN CALCIUM 80 MG: 80 TABLET, FILM COATED ORAL at 20:45

## 2023-02-16 RX ADMIN — CARVEDILOL 6.25 MG: 6.25 TABLET, FILM COATED ORAL at 20:45

## 2023-02-16 RX ADMIN — INSULIN LISPRO 4 UNITS: 100 INJECTION, SOLUTION INTRAVENOUS; SUBCUTANEOUS at 13:14

## 2023-02-16 RX ADMIN — BENZONATATE 200 MG: 200 CAPSULE ORAL at 13:14

## 2023-02-16 RX ADMIN — HEPARIN SODIUM 5000 UNITS: 5000 INJECTION INTRAVENOUS; SUBCUTANEOUS at 13:14

## 2023-02-16 RX ADMIN — TICAGRELOR 90 MG: 90 TABLET ORAL at 21:25

## 2023-02-16 RX ADMIN — ASPIRIN 81 MG: 81 TABLET, CHEWABLE ORAL at 07:59

## 2023-02-16 RX ADMIN — ALLOPURINOL 100 MG: 100 TABLET ORAL at 07:59

## 2023-02-16 RX ADMIN — DOCUSATE SODIUM 100 MG: 100 CAPSULE, LIQUID FILLED ORAL at 08:00

## 2023-02-16 RX ADMIN — GABAPENTIN 300 MG: 300 CAPSULE ORAL at 07:59

## 2023-02-16 RX ADMIN — LINACLOTIDE 145 MCG: 145 CAPSULE, GELATIN COATED ORAL at 05:31

## 2023-02-16 RX ADMIN — TAMSULOSIN HYDROCHLORIDE 0.4 MG: 0.4 CAPSULE ORAL at 07:59

## 2023-02-16 RX ADMIN — HEPARIN SODIUM 5000 UNITS: 5000 INJECTION INTRAVENOUS; SUBCUTANEOUS at 21:04

## 2023-02-16 RX ADMIN — BUMETANIDE 1 MG: 1 TABLET ORAL at 20:43

## 2023-02-16 RX ADMIN — TICAGRELOR 90 MG: 90 TABLET ORAL at 08:05

## 2023-02-16 RX ADMIN — Medication 9 MG: at 21:05

## 2023-02-16 RX ADMIN — BENZONATATE 100 MG: 100 CAPSULE ORAL at 05:31

## 2023-02-16 RX ADMIN — Medication 60 MG: at 20:46

## 2023-02-16 RX ADMIN — INSULIN GLARGINE 45 UNITS: 100 INJECTION, SOLUTION SUBCUTANEOUS at 07:57

## 2023-02-16 RX ADMIN — CARVEDILOL 6.25 MG: 6.25 TABLET, FILM COATED ORAL at 07:59

## 2023-02-16 RX ADMIN — AMLODIPINE BESYLATE 5 MG: 5 TABLET ORAL at 07:59

## 2023-02-16 NOTE — PROGRESS NOTES
[address] [facility]   INPATIENT OCCUPATIONAL THERAPY  PROGRESS NOTE  Date: 2023  Patient Name: Ian Max       Room: 6905/1255-06  MRN: [id number]    : 1960  (57 y.o.)  Gender: female   Referring Practitioner: Ronda Joseph MD  Diagnosis: Syncope and collapse    Restrictions/Precautions  Restrictions/Precautions  Restrictions/Precautions: Fall Risk;Up as Tolerated  Required Braces or Orthoses?: No    Vitals  O2 Device: Nasal cannula  Comment: 4.5L O2 via NC. Pt averaged 92% during seated ex's. Pt demonstrates increased SOB initally upon transfer to EOB. Subjective  Subjective  Subjective: \"I used to hate having this oxygen but not I hate not having it\"  Subjective  Subjective: Pt only reports pain in chest with coughing. Comments: Pt lying in bed upon arrival, pleasant and agreeable to participate. Pt reports washing up earlier this date with PCT assist prn. Objective  Orientation  Overall Orientation Status: Within Functional Limits  Cognition  Overall Cognitive Status: WFL    Activities of Daily Living  ADL  Equipment Provided: Reacher, Long-handled sponge  Feeding: Setup  Grooming: Setup  Grooming Skilled Clinical Factors: pt engaged in face washing, oral care, hair care (shampoo cap and combing) while seated in bedside chair. UE Bathing: Setup  LE Bathing: Minimal assistance  LE Bathing Skilled Clinical Factors: Assist with buttocks per pt report. UE Dressing: Setup  LE Dressing: Minimal assistance  LE Dressing Skilled Clinical Factors: Assist to mari ade jesus B socks, pt able to demonstrate use of reacher for threading. Toileting: Stand by assistance  Toileting Skilled Clinical Factors: urinated only earlier this date. Additional Comments: ADL scores based on skilled clinical reasoning and pt report. Pt is currently limited by dizziness, balance, strength, and endurance impacting performance in self care tasks.      Balance  Balance  Sitting Balance: Supervision (seated EOB for exercise participation, ~25 min EOB)  Standing Balance: Stand by assistance  Standing Balance  Time: <30 secs    Transfers/Mobility  Bed mobility  Supine to Sit: Supervision  Sit to Supine: Supervision  Bed Mobility Comments: HOB elevated with use of bedrail  Transfers  Sit to stand: Stand by assistance  Stand to sit: Stand by assistance     OT Exercises  Exercise Treatment: Pt participated in UB ex's while seated EOB to address overall strength and endurance for functional care tasks. Pt initiates with 2# however due to weakness, requested lighter weight of 1# to increased tolerance and technique. Pt completes in all available planes x10 reps with increased rest breaks.       Patient Education  Patient Education  Education Given To: Patient  Education Provided: Role of Therapy, Plan of Care, Transfer Training  Education Method: Verbal  Barriers to Learning: None  Education Outcome: Verbalized understanding, Continued education needed      Goals  Short Term Goals  Time Frame for Short Term Goals: By discharge, pt will  Short Term Goal 1: perform functional transfers/mobility during self care tasks with SBA, least restrictive device, and Good safety  Short Term Goal 2: be educated on and explore use of DME/AE to increase ease and independence during ADLs  Short Term Goal 3: perform lower body ADLs with CGA and adaptive equipment/techs as needed  Short Term Goal 4: tolerate standing and reaching for 6+ minutes with SBA and no LOB during functional activity  Short Term Goal 5: actively participate in 15+ minutes of therapeutic exercise/functional activity to increase overall strength/endurance needed for ADLs  Occupational Therapy Plan  Times Per Week: 4-6  Current Treatment Recommendations: Strengthening, Functional mobility training, Balance training, Endurance training, Pain management, Safety education & training, Equipment evaluation, education, & procurement, Patient/Caregiver education & training, Self-Care / ADL, Home management training      Assessment  Activity Tolerance  Activity Tolerance: Treatment limited secondary to medical complications (free text), Patient limited by fatigue (SOB with minimal exertion.)  Activity Tolerance Comments: Pt presents with increased dry cough this date.   Assessment  Performance deficits / Impairments: Decreased functional mobility , Decreased ADL status, Decreased strength, Decreased endurance, Decreased balance, Decreased high-level IADLs, Decreased safe awareness  Treatment Diagnosis: impaired self care status  Prognosis: Good  Decision Making: Medium Complexity  Discharge Recommendations: Patient would benefit from continued therapy after discharge  OT Equipment Recommendations  Other: TBD  Safety Devices  Type of Devices: Nurse notified, Call light within reach, Left in chair, Gait belt      AM-PAC Daily Activities Inpatient  AM-PAC Daily Activity - Inpatient   How much help is needed for putting on and taking off regular lower body clothing?: A Little  How much help is needed for bathing (which includes washing, rinsing, drying)?: A Little  How much help is needed for toileting (which includes using toilet, bedpan, or urinal)?: A Little  How much help is needed for putting on and taking off regular upper body clothing?: A Little  How much help is needed for taking care of personal grooming?: A Little  How much help for eating meals?: A Little  AM-PAC Inpatient Daily Activity Raw Score: 18  AM-PAC Inpatient ADL T-Scale Score : 38.66  ADL Inpatient CMS 0-100% Score: 46.65  ADL Inpatient CMS G-Code Modifier : CK    OT Minutes  OT Individual Minutes  Time In: 1082  Time Out: 0907  Minutes: 30      Electronically signed by DAYANA Murphy on 2/16/2023 at 10:17 AM

## 2023-02-16 NOTE — PLAN OF CARE
Problem: Discharge Planning  Goal: Discharge to home or other facility with appropriate resources  2/16/2023 0451 by Tyrese Fan RN  Outcome: Progressing  2/15/2023 1700 by Bart Rodrigues RN  Outcome: Progressing     Problem: Pain  Goal: Verbalizes/displays adequate comfort level or baseline comfort level  2/16/2023 0451 by Tyrese Fan RN  Outcome: Progressing  2/15/2023 1700 by Bart Rodrigues RN  Outcome: Progressing     Problem: Safety - Adult  Goal: Free from fall injury  2/16/2023 0451 by Tyrese Fan RN  Outcome: Progressing  2/15/2023 1700 by Bart Rodrigues RN  Outcome: Progressing     Problem: ABCDS Injury Assessment  Goal: Absence of physical injury  2/15/2023 1700 by Bart Rodrigues RN  Outcome: Progressing     Problem: Skin/Tissue Integrity  Goal: Absence of new skin breakdown  Description: 1. Monitor for areas of redness and/or skin breakdown  2. Assess vascular access sites hourly  3. Every 4-6 hours minimum:  Change oxygen saturation probe site  4. Every 4-6 hours:  If on nasal continuous positive airway pressure, respiratory therapy assess nares and determine need for appliance change or resting period.   2/15/2023 1700 by Bart Rodrigues RN  Outcome: Progressing

## 2023-02-16 NOTE — CONSULTS
[facility]   CONSULT NOTE:      DATE OF CONSULT 2/16/2023    REASON FOR CONSULTATION:  Persistent cough      PCP AFSANEH Chavez CNP     CHIEF COMPLAINT: Syncope and collapse    HISTORY OF PRESENT ILLNESS: This is a 76-year-old female with a history of DVT history of CHF with preserved ejection, CAD s/p stent, CKD stage 4 (previously dialysis dependent), type 2 diabetes mellitus on insulin, chronic hypoxic respiratory failure came in 4 days ago after an episode of syncope and collapse. Patient was recently discharged with a concern of acute respiratory distress from [place], CT PE was done which was unremarkable, patient was initially put on BiPAP and Lasix drip and eventually switched to Bumex and metolazone and discharged. Patient said that she was feeling dizzy and lightheaded and had an episode of fall, not sure if she hit her head, brought by the EMS to the ED for further evaluation. Orthostatic were not done, neurology evaluated the patient and suggested that patient fall is most likely overmedication. Cardiology and nephrology did adjust the medication, patient with stress test which is high risk and eventually  Nephrology cleared her and patient has successful FADIA on RCA 2/14/2023. Pulmonology was consulted due to concern of cough, patient said that she is coughing up without any phlegm is mostly dry, no history of smoking, exposure to secondhand smoking exposure. PFTs done on March 22 of this year which showed an FVC and FEV1 of 50 and 54% the ratio was normal there was no response to bronchodilators. Her diffusing capacity was severely decreased even when corrected for alveolar ventilation was only 59% of predicted. Her lung volumes showed a mild to moderate restrictive pattern with an total lung capacity of 64% of predicted.           ALLERGIES:  Allergies   Allergen Reactions    Adhesive Tape Other (See Comments) and Rash Other reaction(s): Unknown    Isosorbide Dinitrate Angioedema    Ranexa [Ranolazine] Angioedema    Metformin And Related Diarrhea    Ace Inhibitors Other (See Comments)     Cough, states not good for her kidneys    Iv Dye [Iodides]      Stage 4 kidney disease    Nsaids      CANNOT TAKE D/T KIDNEY DISEASE    Metformin And Related Hives, Itching and Rash     Stage 4 kidney disease.        HOME MEDICATIONS:  Medications Prior to Admission: melatonin 3 MG TABS tablet, Take 10 mg by mouth nightly as needed (for sleep) Indications: Trouble Sleeping  amLODIPine (NORVASC) 10 MG tablet, Take 1 tablet by mouth daily  bumetanide (BUMEX) 1 MG tablet, Take 3 tablets by mouth 2 times daily F/u with nephro for dose adjustment  carvedilol (COREG) 12.5 MG tablet, Take 1 tablet by mouth 2 times daily  [DISCONTINUED] atorvastatin (LIPITOR) 40 MG tablet, Take 1 tablet by mouth nightly  Continuous Blood Gluc Sensor (DEXCOM G6 SENSOR) MISC, 1 each by Does not apply route 4 times daily  Continuous Blood Gluc  (DEXCOM G6 ) KODY, 1 each by Does not apply route 4 times daily  linaclotide (LINZESS) 145 MCG capsule, Take 1 capsule by mouth every morning (before breakfast)  sodium bicarbonate 650 MG tablet, Take 2 tablets by mouth 2 times daily (Patient taking differently: Take 1,300 mg by mouth 2 times daily Two tab BID)  insulin glargine (BASAGLAR KWIKPEN) 100 UNIT/ML injection pen, Inject 55 Units into the skin 2 times daily  allopurinol (ZYLOPRIM) 100 MG tablet, Take 1 tablet by mouth daily  pantoprazole (PROTONIX) 40 MG tablet, TAKE 1 TABLET BY MOUTH ONCE DAILY IN THE MORNING BEFORE BREAKFAST  tamsulosin (FLOMAX) 0.4 MG capsule, Take 1 capsule by mouth daily  Insulin Pen Needle (EASY TOUCH PEN NEEDLES) 29G X 12MM MISC, 5 each by Does not apply route daily  Blood Glucose Monitoring Suppl (TRUE METRIX GO GLUCOSE METER) w/Device KIT, 1 each by Does not apply route 4 times daily  blood glucose monitor strips, Test 3 times a day & as needed for symptoms of irregular blood glucose. Dispense sufficient amount for indicated testing frequency plus additional to accommodate PRN testing needs. AZO-CRANBERRY PO, Take 2 capsules by mouth daily  Calcium Polycarbophil (FIBER-CAPS PO), Take 2 capsules by mouth in the morning and at bedtime  Lactobacillus (PROBIOTIC ACIDOPHILUS PO), Take by mouth daily (Patient not taking: Reported on 2/12/2023)  gabapentin (NEURONTIN) 300 MG capsule, Take 300 mg by mouth in the morning.   acetaminophen (TYLENOL) 325 MG tablet, Take 650 mg by mouth every 6 hours as needed for Pain  ReliOn Lancets Micro-Thin 33G MISC, USE AS DIRECTED EVERY DAY  HUMALOG 100 UNIT/ML injection vial, Inject into the skin 3 times daily (before meals) Indications: 15 units and sliding scale (patient reports only the sliding scale)   Lancets MISC, 1 each by Does not apply route daily  docusate sodium (COLACE) 100 MG capsule, Take 1 capsule by mouth 2 times daily  aspirin 81 MG chewable tablet, Take 1 tablet by mouth daily      PAST MEDICAL HISTORY:  Past Medical History:   Diagnosis Date    Arthritis     Backache, unspecified     Cerebral artery occlusion with cerebral infarction (HCC)     CHF (congestive heart failure) (HCC)     Chronic kidney disease     Coronary atherosclerosis of artery bypass graft     COVID 1/31/2022    Cramp of limb     Gallstones     Hemodialysis patient ([facility] [address]     Hyperlipidemia     Hypertension     Insomnia     Neuromuscular disorder ([facility] [address]     Pneumonia     Psychiatric problem     Thyroid disease     Type II or unspecified type diabetes mellitus with renal manifestations, not stated as uncontrolled(250.40)     Type II or unspecified type diabetes mellitus without mention of complication, not stated as uncontrolled     Unspecified vitamin D deficiency        PAST SURGICAL HISTORY:  Past Surgical History:   Procedure Laterality Date    ANKLE FRACTURE SURGERY      AV FISTULA CREATION  12/14/2021    BACK SURGERY BREAST SURGERY      CARDIAC CATHETERIZATION  02/14/2023    Dr. Augustina Matthew, OPEN N/A     COLONOSCOPY      CORONARY ARTERY BYPASS GRAFT      x3    DIALYSIS FISTULA CREATION Left 12/14/2021    LEFT AV FISTULA CREATION UPPER EXTREMITY performed by Bro Burk MD at [facility]      IR TUNNELED CATHETER PLACEMENT GREATER THAN 5 YEARS  08/18/2021    IR TUNNELED CATHETER PLACEMENT GREATER THAN 5 YEARS 8/18/2021 [facility]    KNEE ARTHROSCOPY      right    OTHER SURGICAL HISTORY  04/06/2022    cvc exchange    TONSILLECTOMY            SOCIAL HISTORY:  Social History     Socioeconomic History    Marital status: Single     Spouse name: Not on file    Number of children: Not on file    Years of education: Not on file    Highest education level: Not on file   Occupational History    Not on file   Tobacco Use    Smoking status: Never    Smokeless tobacco: Never   Vaping Use    Vaping Use: Never used   Substance and Sexual Activity    Alcohol use: No    Drug use: No    Sexual activity: Not Currently   Other Topics Concern    Not on file   Social History Narrative    Not on file     Social Determinants of Health     Financial Resource Strain: Low Risk     Difficulty of Paying Living Expenses: Not very hard   Food Insecurity: No Food Insecurity    Worried About Running Out of Food in the Last Year: Never true    Ran Out of Food in the Last Year: Never true   Transportation Needs: No Transportation Needs    Lack of Transportation (Medical): No    Lack of Transportation (Non-Medical): No   Physical Activity: Insufficiently Active    Days of Exercise per Week: 1 day    Minutes of Exercise per Session: 20 min   Stress: Stress Concern Present    Feeling of Stress :  To some extent   Social Connections: Socially Isolated    Frequency of Communication with Friends and Family: More than three times a week Frequency of Social Gatherings with Friends and Family: More than three times a week    Attends Christianity Services: Never    Active Member of Clubs or Organizations: No    Attends Club or Organization Meetings: Never    Marital Status: Never    Intimate Partner Violence: Not on file   Housing Stability: Low Risk     Unable to Pay for Housing in the Last Year: No    Number of Places Lived in the Last Year: 1    Unstable Housing in the Last Year: No       FAMILY HISTORY:  Family History   Problem Relation Age of Onset    Diabetes Father     Heart Failure Father        REVIEW OF SYSTEMS:  All other systems reviewed and are negative. PHYSICAL EXAM:  Vital Signs Blood pressure (!) 116/55, pulse 58, temperature 98.1 °F (36.7 °C), resp. rate 22, weight 248 lb (112.5 kg), SpO2 93 %. Oxygen Amount and Delivery: O2 Flow Rate (L/min): 4 L/min    Admission Weight Weight: 248 lb (112.5 kg)    General Appearance     Head  Normocephalic, morbid obese    Eyes  conjunctivae/corneas clear. PERRL, EOM's intact. Fundi benign. ENT  crowded neck, low hanging palate  Neck  no adenopathy, no carotid bruit, no JVD, supple, symmetrical, trachea midline and thyroid not enlarged, symmetric, no tenderness/mass/nodules  Lungs  Bilateral crackles, no wheeze  Heart: regular rate and rhythm, S1, S2 normal,  Abdomen  soft, non-tender; bowel sounds normal; no masses,  no organomegaly  Extremities +1 peripheral edema, left leg swollen more as compared to right    Skin  Skin color, texture, turgor normal. No rashes or lesions  Neurologic: Alert and oriented X 3, normal strength and tone.          Imaging      Lab Review  CBC     Lab Results   Component Value Date/Time    WBC 5.7 02/16/2023 06:02 AM    RBC 3.52 02/16/2023 06:02 AM    HGB 11.1 02/16/2023 06:02 AM    HCT 33.2 02/16/2023 06:02 AM     02/16/2023 06:02 AM    MCV 94.3 02/16/2023 06:02 AM    MCH 31.7 02/16/2023 06:02 AM    MCHC 33.6 02/16/2023 06:02 AM    RDW 14.9 02/16/2023 06:02 AM    LYMPHOPCT 12 02/16/2023 06:02 AM    LYMPHOPCT 23.1 04/09/2015 12:00 AM    MONOPCT 7 02/16/2023 06:02 AM    MONOPCT 4.6 04/09/2015 12:00 AM    EOSPCT 6.7 04/09/2015 12:00 AM    BASOPCT 1 02/16/2023 06:02 AM    BASOPCT 0.7 04/09/2015 12:00 AM    MONOSABS 0.40 02/16/2023 06:02 AM    MONOSABS 0.4 04/09/2015 12:00 AM    LYMPHSABS 0.70 02/16/2023 06:02 AM    LYMPHSABS 1.8 04/09/2015 12:00 AM    EOSABS 0.30 02/16/2023 06:02 AM    EOSABS 0.5 04/09/2015 12:00 AM    BASOSABS 0.10 02/16/2023 06:02 AM    DIFFTYPE NOT REPORTED 02/16/2022 07:01 PM       BMP   Lab Results   Component Value Date/Time     02/16/2023 06:02 AM    K 3.8 02/16/2023 06:02 AM     02/16/2023 06:02 AM    CO2 30 02/16/2023 06:02 AM    BUN 59 02/16/2023 06:02 AM    CREATININE 2.74 02/16/2023 06:02 AM    GLUCOSE 139 02/16/2023 06:02 AM    CALCIUM 9.1 02/16/2023 06:02 AM       LFTS  Lab Results   Component Value Date/Time    ALKPHOS 120 02/07/2023 05:45 AM    ALT 11 02/07/2023 05:45 AM    AST 12 02/07/2023 05:45 AM    PROT 6.4 02/07/2023 05:45 AM    BILITOT 0.3 02/07/2023 05:45 AM    BILIDIR 0.23 04/26/2022 05:48 AM    IBILI 0.59 04/26/2022 05:48 AM    LABALBU 3.0 02/07/2023 05:45 AM       INR  No results for input(s): PROTIME, INR in the last 72 hours. APTT  No results for input(s): APTT in the last 72 hours.     Lactic Acid  No results found for: LACTA     PRO-BNP   Recent Labs     02/15/23  0724   PROBNP 2,010*           ABGs:   Lab Results   Component Value Date/Time    PHART 7.429 04/24/2022 01:42 PM    PO2ART 47.6 04/24/2022 01:42 PM    QPN3CAS 45.9 04/24/2022 01:42 PM       Lab Results   Component Value Date/Time    MODE CONDITION NO LONGER WARRANTS 04/06/2021 07:49 PM         Impression  Syncope and collapse due to orthostatic hypotension likely secondary to polypharmacy  Acute chronic hypoxic respiratory failure due to CHF (3 L oxygen at baseline)  CHF with preserved ejection fraction  CAD s/p stent placement on 2/14/2023  CKD stage IV (previously on dialysis, taken off 6 months ago)  Obstructive sleep apnea  History of Enterococcus facialis and UTI completed course of ceftriaxone (2/7/2023)  Type 2 diabetes mellitus  Morbid obesity  Debility due to above conditions  Restrictive lung pattern on recent PFTs  Hx of DVT, chronic lymphedema  Non-smoker  Full code      Plan:    Patient is hypoxic due to multiple comorbidities, currently maintaining on 5 L nasal cannula oxygen, keep saturation above 88. We will discuss with Dr. Vivek Sierra about getting a CT chest without contrast for further evaluation of pulmonary infiltrates. We will add Tessalon Perles to help patient with coughing. Doubt that patient cough is likely secondary to pulmonary edema. Doubt that patient has pneumonia since she is not spiking fever and cough is not productive. No leukocytosis. We will repeat the Doppler scan since her previous 1 was unremarkable but patient keeps on having a syncopal episode. Discussed the CODE STATUS with the patient in details, sister is DURABLE [address] [place] [address]. Patient might benefit from palliative consult. Case will be discussed with Dr. Vivek Sierra, follow-up for further recommendations. Blake Stern  PGY-3  Family Medicine     2/16/2023  12:12 PM      Patient seen and examined independently by me. Above discussed and I agree with resident note except where indicated in the EMR revision history. Also see my additional comments and changes indicated by discrete font, text color, italics, and/or initials. Labs, cultures, and radiographs where available were reviewed. Patient known to our service from multiple inpatient visits, last seen in April of last year. She has an appoint with me in March. She presents with syncopal episode and a dry cough. Given her clinical history, it does not appear that she has had a pulmonary embolism but we will check venous Dopplers.   We will not do CT of the chest for PE protocol due to her kidney insufficiency. I D-dimer will be elevated so there is no point in getting that. She has an extensive cardiac history and has cardiomegaly. She has a untreated sleep apnea but her previous PFT showed a restrictive not obstructive pattern. She needs to have those repeated as an outpatient. She has chronic swelling in her lower extremities left greater than right, but she complains of right calf pain, Braxton Daniel' sign is positive on that side. Agree with making her antitussives scheduled. We will also add flutter valve therapy. I do not think she needs antibiotics since clinically she does not appear to have pneumonia. Wean oxygen to keep saturations greater than 88%. She normally is on 3 L now she is on 5. We will place her on a continuous pulse oximetry.   Electronically signed by Brittany Pearson MD on 2/16/2023 at 12:34 PM

## 2023-02-16 NOTE — PROGRESS NOTES
Physician Progress Note      PATIENT:               Librado Morel  CSN #:                  [id number]  :                       1960  ADMIT DATE:       2023 7:52 PM  [address] [place] DATE:  Linda Memira  PROVIDER #:        Leilani Cheek MD          QUERY TEXT:    Pt admitted w/ syncopal episode. Noted documentation of \" large anterior MI\"   in  Cardio PN. If possible, please document in PNs & d/c summary:    The medical record reflects the following:  Risk Factors: PMH of Type II DM, CKD, & HTN  Clinical Indicators: ED Provider Note :  Pt has syncopal episode at home   was on the ground unable to get up. Admit pt d/t syncopal episode & collapse. H&P : Pt will be getting stress test Monday. Cardio Consult :   Cardiology was consulted d/t syncope. I was notified overnight that she had   some bradycardia w/ a HR in the 50s. Syncope-? d/t hypotension. NM Stress Test   : Large infarct in the lateral wall extending into the cardiac apex w/   some stalin-infarct ischemia in anterolateral wall. Cardio PN : Pt had a   stress test done result as below large anterior MI   Treatment: Cardiac cath w/ PCI placement. Options provided:  -- Large anterior MI confirmed present on admission  -- Defer to Cardio consultant documentation regarding large anterior MI  -- Other - I will add my own diagnosis  -- Disagree - Not applicable / Not valid  -- Disagree - Clinically unable to determine / Unknown  -- Refer to Clinical Documentation Reviewer    PROVIDER RESPONSE TEXT:    I defer to Cardio consultant regarding documentation of large anterior MI. Query created by: Estela Peterson on 2/15/2023 12:40 PM      Electronically signed by:   Leilani Cheek MD 2023 10:04 AM

## 2023-02-16 NOTE — PLAN OF CARE
Problem: Discharge Planning  Goal: Discharge to home or other facility with appropriate resources  2/16/2023 1711 by Shirley Rivera RN  Outcome: Progressing  2/16/2023 0451 by Julius Pena RN  Outcome: Progressing     Problem: Pain  Goal: Verbalizes/displays adequate comfort level or baseline comfort level  2/16/2023 1711 by Shirley Rivera RN  Outcome: Progressing  Note: Assess the location, characteristics, onset, duration, frequency, quality, and severity of pain. Encourage immediate report of pain. Use appropriate pain scale to rate pain. Manage pain using nonpharmacologic/pharmacologic interventions. 2/16/2023 0451 by Julius Pena RN  Outcome: Progressing     Problem: Safety - Adult  Goal: Free from fall injury  2/16/2023 1711 by Shirley Rivera RN  Outcome: Progressing  2/16/2023 0451 by Julius Pena RN  Outcome: Progressing     Problem: ABCDS Injury Assessment  Goal: Absence of physical injury  Outcome: Progressing     Problem: Skin/Tissue Integrity  Goal: Absence of new skin breakdown  Description: 1. Monitor for areas of redness and/or skin breakdown  2. Assess vascular access sites hourly  3. Every 4-6 hours minimum:  Change oxygen saturation probe site  4. Every 4-6 hours:  If on nasal continuous positive airway pressure, respiratory therapy assess nares and determine need for appliance change or resting period.   Outcome: Progressing

## 2023-02-16 NOTE — CARE COORDINATION
Writer contacted [facility] for update. Hero said insurance never received the ppw.  She refaxed ppw today

## 2023-02-16 NOTE — PROGRESS NOTES
Physical Therapy  Facility/Department: [facility] MED SURG  Daily Treatment Note  NAME: Edwin Singh  : 1960  MRN: [id number]    Date of Service: 2023    Discharge Recommendations:  Patient would benefit from continued therapy after discharge   PT Equipment Recommendations  Equipment Needed:  (TBD)    Patient Diagnosis(es): The encounter diagnosis was Syncope and collapse. Assessment   Activity Tolerance: Patient limited by fatigue;Patient limited by endurance (Shortness of breathe)  Equipment Needed:  (TBD)     Plan    Physcial Therapy Plan  General Plan: 5-7 times per week  Specific Instructions for Next Treatment: advance to gait as tolerated, continue exercise program and transfers using wheeled walker  Current Treatment Recommendations: Strengthening;Equipment evaluation, education, & procurement;Balance training; Endurance training;Home exercise program;Safety education & training;Functional mobility training;Transfer training;Gait training;Patient/Caregiver education & training; Therapeutic activities  PT Plan of Care:  (5-7 treatments/ week)     Restrictions  Restrictions/Precautions  Restrictions/Precautions: Fall Risk, Up as Tolerated  Required Braces or Orthoses?: No  Implants present? :  (denies, hx CABG ? sternal wires)     Subjective    Subjective  Subjective: Patient reports discomfort with coughing, decreased discomfort with breathing treatments. Pain: Denies pain.   Orientation  Overall Orientation Status: Within Functional Limits  Orientation Level: Oriented X4  Cognition  Overall Cognitive Status: WFL     Objective   Vitals  BP Location: Right lower arm  O2 Device: Nasal cannula  Bed Mobility Training  Bed Mobility Training: Yes  Overall Level of Assistance: Supervision (increased time to complete, HOB slightly elevated)  Interventions: Safety awareness training  Rolling:  (not tested 2* coughing spells)  Supine to Sit: Supervision  Sit to Supine: Supervision  Scooting: Supervision  Balance  Sitting: Without support  Standing: With support  Transfer Training  Transfer Training: Yes  Overall Level of Assistance: Stand-by assistance  Interventions: Safety awareness training;Verbal cues (vcs for hand placement, transfers completed with RW)  Sit to Stand: Stand-by assistance  Stand to Sit: Stand-by assistance  Stand Pivot Transfers: Stand-by assistance  Bed to Chair: Stand-by assistance  Gait Training  Gait Training: Yes  Gait  Overall Level of Assistance: Stand-by assistance  Interventions: Safety awareness training  Base of Support: Widened  Speed/Annie: Slow  Step Length: Left shortened;Right shortened  Swing Pattern: Left symmetrical;Right symmetrical  Distance (ft): 45 Feet  Assistive Device: Walker, rolling  Wheelchair Management  Wheelchair Management: No     PT Exercises  Exercise Treatment: transfers, bed mobility, gait, seated ther ex  A/AROM Exercises: seated LAQ, marches, hamstring curls, heel/toe raises, calmshells, hip abd x15 reps with orange TB  Resistive Exercises: hamstring curls and hip abduction with orange tband x20  Circulation/Endurance Exercises: ankle pumps, quad sets, glut sets  Pressure Relief Exercises: weight shifting L<>R, scooting towards Hi 94 Training: STS x4 reps  Static Sitting Balance Exercises: dangled 17 min  Dynamic Sitting Balance Exercises: reaching outside ELISABET, trunk rotation, weight shifting, postural ex  Breathing Techniques: dyphargmatic breathing and pursed lip breathing     Safety Devices  Type of Devices: Nurse notified;Call light within reach; Left in chair;Gait belt  Restraints  Restraints Initially in Place: No     AM-PAC Basic Mobility - Inpatient   How much help is needed turning from your back to your side while in a flat bed without using bedrails?: A Little  How much help is needed moving from lying on your back to sitting on the side of a flat bed without using bedrails?: A Little  How much help is needed moving to and from a bed to a chair?: A Little  How much help is needed standing up from a chair using your arms?: A Little  How much help is needed walking in hospital room?: A Little  How much help is needed climbing 3-5 steps with a railing?: A Little  AM-[facility] Inpatient Mobility Raw Score : 18  AM-PAC Inpatient T-Scale Score : 43.63  Mobility Inpatient CMS 0-100% Score: 46.58  Mobility Inpatient CMS G-Code Modifier : CK     Goals  Short Term Goals  Time Frame for Short Term Goals: 5-7 treatments/ week  Short Term Goal 1: pt to tolerate 1/2 hour of therapuetic exercise and activity  Short Term Goal 2: pt to demonstrate good technique for LE strengthening, balance  and energy conservation  Short Term Goal 3: pt to demonstrate MODIFIED  independent rolling in bed for position change  Short Term Goal 4: pt to demonstrate supine <> sit w/ supervision  Short Term Goal 5: pt to demonstrate sit <> stand and bed <> chair using wheeled walker w/ SBA x 1  Short Term Goal 6: pt to demonstrate gait 30-50' using wheeled walker w/ SBA x 1  Short Term Goal 7: pt to demonstrate good ambulatory balance using wheeled walker  Patient Goals   Patient Goals : therapy at nursing home at [facility] Food    Education  Patient Education  Education Given To: Patient  Education Provided: Role of Therapy;Plan of Care;Energy Conservation; Fall Prevention Strategies  Education Method: Demonstration;Verbal  Barriers to Learning: None  Education Outcome: Continued education needed    Therapy Time   Individual Concurrent Group Co-treatment   Time In 1024         Time Out [address] [place], [place]

## 2023-02-16 NOTE — PROGRESS NOTES
FAMILY MEDICINE  - PROGRESS NOTE    Date:  2/16/2023  Hayden Trevizo  [id number]      Chief Complaint   Patient presents with    Loss of Consciousness         Interval History:  not changed, no significant events overnight. She is complaining of continued shortness of breath & dyspnea this am. Her pro-BNP was 2,010 yesterday. She had a cardiac cath with stent placement done yesterday. Specialists notes, labs & imaging reviewed.       Subjective  Constitutional: positive for obesity  Genitourinary:positive for dysuria and frequency  Neurological: positive for syncope  Endocrine: positive for diabetic symptoms including hyperglycemia :    Objective:    /65   Pulse 63   Temp 97.8 °F (36.6 °C)   Resp 16   Wt 248 lb (112.5 kg)   SpO2 94%   BMI 41.27 kg/m²   General appearance - overweight  Mental status - drowsy  Eyes - not examined  Ears - hearing grossly normal bilaterally  Nose - normal and patent, no erythema, discharge or polyps  Mouth - mucous membranes moist, pharynx normal without lesions  Neck - supple, no significant adenopathy  Lymphatics - no palpable lymphadenopathy, no hepatosplenomegaly  Chest - clear to auscultation, no wheezes, rales or rhonchi, symmetric air entry  Heart - normal rate, regular rhythm, normal S1, S2, no murmurs, rubs, clicks or gallops  Abdomen - soft, nontender, nondistended, no masses or organomegaly  Breasts - not examined  Back exam - not examined  Neurological - neck supple without rigidity  Musculoskeletal - no joint tenderness, deformity or swelling  Extremities - peripheral pulses normal, no pedal edema, no clubbing or cyanosis  Skin - normal coloration and turgor, no rashes, no suspicious skin lesions noted    Data:   Medications:   Current Facility-Administered Medications   Medication Dose Route Frequency Provider Last Rate Last Admin    bumetanide (BUMEX) tablet 1 mg  1 mg Oral BID Greer Saenz MD   1 mg at 02/15/23 2120    ticagrelor (BRILINTA) tablet 90 mg  90 mg Oral BID Rustam Sterling, DO   90 mg at 02/15/23 2120    atorvastatin (LIPITOR) tablet 80 mg  80 mg Oral Nightly Rustam Sterling, DO   80 mg at 02/15/23 2120    dextromethorphan (DELSYM) 30 MG/5ML extended release liquid 60 mg  60 mg Oral 2 times per day Patricio Swan MD   60 mg at 02/15/23 2121    melatonin tablet 9 mg  9 mg Oral Nightly PRN Whitney Vanegas MD   9 mg at 02/15/23 2138    sodium chloride flush 0.9 % injection 10 mL  10 mL IntraVENous PRN Rustam Sterling, DO   10 mL at 02/13/23 1318    aspirin chewable tablet 81 mg  81 mg Oral Daily Whitney Vanegas MD   81 mg at 02/15/23 3400    docusate sodium (COLACE) capsule 100 mg  100 mg Oral BID Whitney Vanegas MD   100 mg at 02/15/23 2120    linaclotide (LINZESS) capsule 145 mcg  145 mcg Oral QAM AC Whitney Vanegas MD   145 mcg at 02/16/23 0531    pantoprazole (PROTONIX) tablet 40 mg  40 mg Oral QAM AC Whitney Vanegas MD   40 mg at 02/16/23 0531    tamsulosin (FLOMAX) capsule 0.4 mg  0.4 mg Oral Daily Whitney Vanegas MD   0.4 mg at 02/15/23 8271    allopurinol (ZYLOPRIM) tablet 100 mg  100 mg Oral Daily Whitney Vanegas MD   100 mg at 02/15/23 7167    sodium chloride flush 0.9 % injection 5-40 mL  5-40 mL IntraVENous 2 times per day Whitney Vanegas MD   10 mL at 02/15/23 2124    sodium chloride flush 0.9 % injection 5-40 mL  5-40 mL IntraVENous PRN Whitney Vanegas MD        0.9 % sodium chloride infusion   IntraVENous PRN Whitney Vanegas MD        polyethylene glycol (GLYCOLAX) packet 17 g  17 g Oral Daily PRN Whitney Vanegas MD        acetaminophen (TYLENOL) tablet 650 mg  650 mg Oral Q6H PRN Whitney Vanegas MD   650 mg at 02/14/23 1648    Or    acetaminophen (TYLENOL) suppository 650 mg  650 mg Rectal Q6H PRN Whitney Vanegas MD        ondansetron (ZOFRAN-ODT) disintegrating tablet 4 mg  4 mg Oral Q8H PRN Whitney Vanegas MD        Or    ondansetron (ZOFRAN) injection 4 mg  4 mg IntraVENous Q6H PRN Christopher Fonseca MD        heparin (porcine) injection 5,000 Units  5,000 Units SubCUTAneous 3 times per day Christopher Fonseca MD   5,000 Units at 02/16/23 0531    glucose chewable tablet 16 g  4 tablet Oral PRN Christopher Fonseca MD        dextrose bolus 10% 125 mL  125 mL IntraVENous PRN Christopher Fonseca MD        Or    dextrose bolus 10% 250 mL  250 mL IntraVENous PRN Christopher Fonseca MD        glucagon (rDNA) injection 1 mg  1 mg SubCUTAneous PRN Christopher Fonseca MD        dextrose 10 % infusion   IntraVENous Continuous PRN Christopher Fonseca MD        gabapentin (NEURONTIN) capsule 300 mg  300 mg Oral Daily Christopher Fonseca MD   300 mg at 02/15/23 0821    carvedilol (COREG) tablet 6.25 mg  6.25 mg Oral BID Rustam Sterling, DO   6.25 mg at 02/15/23 2120    amLODIPine (NORVASC) tablet 5 mg  5 mg Oral Daily Rustam Sterling, DO   5 mg at 02/15/23 0823    insulin glargine (LANTUS) injection vial 45 Units  45 Units SubCUTAneous BID Christopher Fonseca MD   45 Units at 02/15/23 2124    insulin lispro (HUMALOG) injection vial 0-16 Units  0-16 Units SubCUTAneous TID WC Christopher Fonseca MD   16 Units at 02/15/23 1147    insulin lispro (HUMALOG) injection vial 0-4 Units  0-4 Units SubCUTAneous Nightly Christopher Fonseca MD   4 Units at 02/14/23 2153    benzonatate (TESSALON) capsule 100 mg  100 mg Oral TID PRN Christopher Fonseca MD   100 mg at 02/16/23 0531     No intake or output data in the 24 hours ending 02/16/23 0601    Recent Results (from the past 24 hour(s))   POC Glucose Fingerstick    Collection Time: 02/15/23  6:37 AM   Result Value Ref Range    POC Glucose 223 (H) 65 - 105 mg/dL   Basic Metabolic Panel w/ Reflex to MG    Collection Time: 02/15/23  7:24 AM   Result Value Ref Range    Glucose 262 (H) 70 - 99 mg/dL    BUN 60 (H) 8 - 23 mg/dL    Creatinine 2.62 (H) 0.50 - 0.90 mg/dL    Est, Glom Filt Rate 20 (L) >60 mL/min/1.73m2    Calcium 8.9 8.6 - 10.4 mg/dL    Sodium 141 135 - 144 mmol/L    Potassium 3.7 3.7 - 5.3 mmol/L    Chloride 100 98 - 107 mmol/L    CO2 31 20 - 31 mmol/L    Anion Gap 10 9 - 17 mmol/L   CBC with Auto Differential    Collection Time: 02/15/23  7:24 AM   Result Value Ref Range    WBC 6.2 3.5 - 11.0 k/uL    RBC 3.67 (L) 4.0 - 5.2 m/uL    Hemoglobin 11.7 (L) 12.0 - 16.0 g/dL    Hematocrit 34.4 (L) 36 - 46 %    MCV 93.8 80 - 100 fL    MCH 31.8 26 - 34 pg    MCHC 33.9 31 - 37 g/dL    RDW 14.6 11.5 - 14.9 %    Platelets 938 433 - 729 k/uL    MPV 9.2 6.0 - 12.0 fL    Seg Neutrophils 80 (H) 36 - 66 %    Lymphocytes 10 (L) 24 - 44 %    Monocytes 6 1 - 7 %    Eosinophils % 4 0 - 4 %    Basophils 0 0 - 2 %    Segs Absolute 4.90 1.3 - 9.1 k/uL    Absolute Lymph # 0.60 (L) 1.0 - 4.8 k/uL    Absolute Mono # 0.40 0.1 - 1.3 k/uL    Absolute Eos # 0.30 0.0 - 0.4 k/uL    Basophils Absolute 0.00 0.0 - 0.2 k/uL   Brain Natriuretic Peptide    Collection Time: 02/15/23  7:24 AM   Result Value Ref Range    Pro-BNP 2,010 (H) <300 pg/mL   POC Glucose Fingerstick    Collection Time: 02/15/23 11:13 AM   Result Value Ref Range    POC Glucose 359 (H) 65 - 105 mg/dL   POC Glucose Fingerstick    Collection Time: 02/15/23  4:57 PM   Result Value Ref Range    POC Glucose 170 (H) 65 - 105 mg/dL   POC Glucose Fingerstick    Collection Time: 02/15/23  8:18 PM   Result Value Ref Range    POC Glucose 189 (H) 65 - 105 mg/dL     -----------------------------------------------------------------  RAD:  EKG:  Micro:     Assessment & Plan:    Patient Active Problem List:     Atherosclerosis of coronary artery bypass graft of native heart without angina pectoris     Acute kidney injury superimposed on CKD (HCC)     Acute on chronic diastolic heart failure (HCC)     Diabetic polyneuropathy associated with type 2 diabetes mellitus (HCC)     History of coronary artery bypass graft     Iron deficiency anemia     Spinal stenosis of lumbar region with neurogenic claudication     Mixed hyperlipidemia     CKD (chronic kidney disease) stage 4, GFR 15-29 ml/min ([facility])     Type 2 diabetes 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, with long-term current use of insulin (HCC)     Obesity, Class II, BMI 35-39.9     Thyroid nodule greater than or equal to 1 cm in diameter incidentally noted on imaging study     Hypertension, essential     Chronic ischemic heart disease     Ischemic stroke of frontal lobe (HCC)     Morbid obesity with BMI of 40.0-44.9, adult (HCC)     Disequilibrium syndrome     Anxiety     Chronic midline low back pain with bilateral sciatica     COVID     Cerebral hypoperfusion     Immature arteriovenous fistula (HCC)     History of fusion of cervical spine     Depression with anxiety     Vancomycin resistant Enterococcus UTI     Dialysis disequilibrium syndrome     Acute cystitis without hematuria     VRE infection (vancomycin resistant Enterococcus)     Infection due to ESBL-producing Klebsiella pneumoniae     Class 2 severe obesity due to excess calories with serious comorbidity and body mass index (BMI) of 35.0 to 35.9 in adult [facility])     Type 2 diabetes mellitus with hyperglycemia, with long-term current use of insulin (HCC)     Right hemiparesis (HCC)     Ulcer of left foot, limited to breakdown of skin ([address]     Secondary hyperparathyroidism ([facility]     Hypertensive urgency     Hypoxia     Congestive heart failure ([address]     Nephrotic range proteinuria     Acute respiratory failure with hypoxia (HCC)     Syncope and collapse     Subacute cough           Plan:  -Syncope and collapse - Cardiology consulted, s/p cardiac cath with stent placement yesterday.  -Dyspnea - repeat CXR shows pulmonary vascular congestion. -UTI - cx showed no growth, IV Rocephin D/C'd.  -CKD4 - Nephrology consulted. -Disequilibrium syndrome - Neurology consulted. -Type 2 DM - hyperglycemia, will resume Lantus and SS coverage with a carb control diet.  -Continue current treatments. -D/C planning ongoing - awaiting pre-cert to SNF, [facility].  -Complete orders per chart.     See orders Disposition:    Electronically signed by Yang Wolf MD on 2/16/2023 at 6:01 AM

## 2023-02-17 VITALS
SYSTOLIC BLOOD PRESSURE: 156 MMHG | RESPIRATION RATE: 18 BRPM | DIASTOLIC BLOOD PRESSURE: 60 MMHG | OXYGEN SATURATION: 96 % | WEIGHT: 248 LBS | HEIGHT: 65 IN | BODY MASS INDEX: 41.32 KG/M2 | TEMPERATURE: 97.6 F | HEART RATE: 62 BPM

## 2023-02-17 LAB
ABSOLUTE EOS #: 0.3 K/UL (ref 0–0.4)
ABSOLUTE LYMPH #: 0.8 K/UL (ref 1–4.8)
ABSOLUTE MONO #: 0.4 K/UL (ref 0.1–1.3)
ANION GAP SERPL CALCULATED.3IONS-SCNC: 12 MMOL/L (ref 9–17)
BASOPHILS # BLD: 1 % (ref 0–2)
BASOPHILS ABSOLUTE: 0 K/UL (ref 0–0.2)
BUN SERPL-MCNC: 55 MG/DL (ref 8–23)
CALCIUM SERPL-MCNC: 8.8 MG/DL (ref 8.6–10.4)
CHLORIDE SERPL-SCNC: 102 MMOL/L (ref 98–107)
CO2 SERPL-SCNC: 28 MMOL/L (ref 20–31)
CREAT SERPL-MCNC: 2.61 MG/DL (ref 0.5–0.9)
EOSINOPHILS RELATIVE PERCENT: 5 % (ref 0–4)
GFR SERPL CREATININE-BSD FRML MDRD: 20 ML/MIN/1.73M2
GLUCOSE BLD-MCNC: 129 MG/DL (ref 65–105)
GLUCOSE BLD-MCNC: 147 MG/DL (ref 65–105)
GLUCOSE BLD-MCNC: 247 MG/DL (ref 65–105)
GLUCOSE BLD-MCNC: 254 MG/DL (ref 65–105)
GLUCOSE SERPL-MCNC: 159 MG/DL (ref 70–99)
HCT VFR BLD AUTO: 33.7 % (ref 36–46)
HGB BLD-MCNC: 11.2 G/DL (ref 12–16)
LYMPHOCYTES # BLD: 13 % (ref 24–44)
MCH RBC QN AUTO: 31.5 PG (ref 26–34)
MCHC RBC AUTO-ENTMCNC: 33.3 G/DL (ref 31–37)
MCV RBC AUTO: 94.5 FL (ref 80–100)
MONOCYTES # BLD: 7 % (ref 1–7)
PDW BLD-RTO: 14.9 % (ref 11.5–14.9)
PLATELET # BLD AUTO: 180 K/UL (ref 150–450)
PMV BLD AUTO: 8.6 FL (ref 6–12)
POTASSIUM SERPL-SCNC: 3.6 MMOL/L (ref 3.7–5.3)
RBC # BLD: 3.57 M/UL (ref 4–5.2)
SEG NEUTROPHILS: 74 % (ref 36–66)
SEGMENTED NEUTROPHILS ABSOLUTE COUNT: 4.4 K/UL (ref 1.3–9.1)
SODIUM SERPL-SCNC: 142 MMOL/L (ref 135–144)
WBC # BLD AUTO: 5.8 K/UL (ref 3.5–11)

## 2023-02-17 PROCEDURE — 6370000000 HC RX 637 (ALT 250 FOR IP): Performed by: INTERNAL MEDICINE

## 2023-02-17 PROCEDURE — 36415 COLL VENOUS BLD VENIPUNCTURE: CPT

## 2023-02-17 PROCEDURE — 97110 THERAPEUTIC EXERCISES: CPT

## 2023-02-17 PROCEDURE — 6370000000 HC RX 637 (ALT 250 FOR IP): Performed by: FAMILY MEDICINE

## 2023-02-17 PROCEDURE — 80048 BASIC METABOLIC PNL TOTAL CA: CPT

## 2023-02-17 PROCEDURE — 94761 N-INVAS EAR/PLS OXIMETRY MLT: CPT

## 2023-02-17 PROCEDURE — 2580000003 HC RX 258: Performed by: STUDENT IN AN ORGANIZED HEALTH CARE EDUCATION/TRAINING PROGRAM

## 2023-02-17 PROCEDURE — 97530 THERAPEUTIC ACTIVITIES: CPT

## 2023-02-17 PROCEDURE — 99239 HOSP IP/OBS DSCHRG MGMT >30: CPT | Performed by: FAMILY MEDICINE

## 2023-02-17 PROCEDURE — 85025 COMPLETE CBC W/AUTO DIFF WBC: CPT

## 2023-02-17 PROCEDURE — 6370000000 HC RX 637 (ALT 250 FOR IP): Performed by: STUDENT IN AN ORGANIZED HEALTH CARE EDUCATION/TRAINING PROGRAM

## 2023-02-17 PROCEDURE — 2700000000 HC OXYGEN THERAPY PER DAY

## 2023-02-17 PROCEDURE — 6360000002 HC RX W HCPCS: Performed by: STUDENT IN AN ORGANIZED HEALTH CARE EDUCATION/TRAINING PROGRAM

## 2023-02-17 PROCEDURE — 82947 ASSAY GLUCOSE BLOOD QUANT: CPT

## 2023-02-17 RX ORDER — BUMETANIDE 1 MG/1
1 TABLET ORAL 3 TIMES DAILY
Status: DISCONTINUED | OUTPATIENT
Start: 2023-02-18 | End: 2023-02-17 | Stop reason: HOSPADM

## 2023-02-17 RX ORDER — BUMETANIDE 1 MG/1
1 TABLET ORAL ONCE
Status: COMPLETED | OUTPATIENT
Start: 2023-02-17 | End: 2023-02-17

## 2023-02-17 RX ORDER — AMLODIPINE BESYLATE 5 MG/1
5 TABLET ORAL DAILY
Qty: 30 TABLET | Refills: 3 | Status: SHIPPED | OUTPATIENT
Start: 2023-02-18

## 2023-02-17 RX ORDER — CARVEDILOL 6.25 MG/1
6.25 TABLET ORAL 2 TIMES DAILY
Qty: 60 TABLET | Refills: 3 | Status: SHIPPED | OUTPATIENT
Start: 2023-02-17

## 2023-02-17 RX ADMIN — AMLODIPINE BESYLATE 5 MG: 5 TABLET ORAL at 08:58

## 2023-02-17 RX ADMIN — BENZONATATE 200 MG: 200 CAPSULE ORAL at 19:49

## 2023-02-17 RX ADMIN — CARVEDILOL 6.25 MG: 6.25 TABLET, FILM COATED ORAL at 08:58

## 2023-02-17 RX ADMIN — ASPIRIN 81 MG: 81 TABLET, CHEWABLE ORAL at 08:57

## 2023-02-17 RX ADMIN — SODIUM CHLORIDE, PRESERVATIVE FREE 10 ML: 5 INJECTION INTRAVENOUS at 20:12

## 2023-02-17 RX ADMIN — HEPARIN SODIUM 5000 UNITS: 5000 INJECTION INTRAVENOUS; SUBCUTANEOUS at 06:06

## 2023-02-17 RX ADMIN — ATORVASTATIN CALCIUM 80 MG: 80 TABLET, FILM COATED ORAL at 19:49

## 2023-02-17 RX ADMIN — TICAGRELOR 90 MG: 90 TABLET ORAL at 19:59

## 2023-02-17 RX ADMIN — INSULIN LISPRO 8 UNITS: 100 INJECTION, SOLUTION INTRAVENOUS; SUBCUTANEOUS at 17:22

## 2023-02-17 RX ADMIN — BUMETANIDE 1 MG: 1 TABLET ORAL at 15:44

## 2023-02-17 RX ADMIN — TICAGRELOR 90 MG: 90 TABLET ORAL at 08:58

## 2023-02-17 RX ADMIN — DOCUSATE SODIUM 100 MG: 100 CAPSULE, LIQUID FILLED ORAL at 08:58

## 2023-02-17 RX ADMIN — LINACLOTIDE 145 MCG: 145 CAPSULE, GELATIN COATED ORAL at 06:06

## 2023-02-17 RX ADMIN — HEPARIN SODIUM 5000 UNITS: 5000 INJECTION INTRAVENOUS; SUBCUTANEOUS at 12:50

## 2023-02-17 RX ADMIN — INSULIN LISPRO 4 UNITS: 100 INJECTION, SOLUTION INTRAVENOUS; SUBCUTANEOUS at 12:51

## 2023-02-17 RX ADMIN — BENZONATATE 200 MG: 200 CAPSULE ORAL at 08:57

## 2023-02-17 RX ADMIN — BUMETANIDE 1 MG: 1 TABLET ORAL at 08:57

## 2023-02-17 RX ADMIN — BUMETANIDE 1 MG: 1 TABLET ORAL at 12:51

## 2023-02-17 RX ADMIN — Medication 60 MG: at 08:58

## 2023-02-17 RX ADMIN — INSULIN GLARGINE 45 UNITS: 100 INJECTION, SOLUTION SUBCUTANEOUS at 08:54

## 2023-02-17 RX ADMIN — PANTOPRAZOLE SODIUM 40 MG: 40 TABLET, DELAYED RELEASE ORAL at 06:06

## 2023-02-17 RX ADMIN — Medication 60 MG: at 19:49

## 2023-02-17 RX ADMIN — SODIUM CHLORIDE, PRESERVATIVE FREE 10 ML: 5 INJECTION INTRAVENOUS at 09:03

## 2023-02-17 RX ADMIN — GABAPENTIN 300 MG: 300 CAPSULE ORAL at 08:57

## 2023-02-17 RX ADMIN — INSULIN GLARGINE 45 UNITS: 100 INJECTION, SOLUTION SUBCUTANEOUS at 20:00

## 2023-02-17 RX ADMIN — CARVEDILOL 6.25 MG: 6.25 TABLET, FILM COATED ORAL at 19:49

## 2023-02-17 RX ADMIN — BENZONATATE 200 MG: 200 CAPSULE ORAL at 15:44

## 2023-02-17 RX ADMIN — Medication 9 MG: at 19:59

## 2023-02-17 RX ADMIN — DOCUSATE SODIUM 100 MG: 100 CAPSULE, LIQUID FILLED ORAL at 19:49

## 2023-02-17 RX ADMIN — ALLOPURINOL 100 MG: 100 TABLET ORAL at 08:58

## 2023-02-17 NOTE — PROGRESS NOTES
Patient talked about her chronic health issues and requested information regarding palliative care services; brochures provided to patient in this regard; patient hopes to be discharged tomorrow back to [facility]; patient is coping and realistic about her medical issues; patient has good support; listening presence and support; welcomed prayer; see ACP note this date;     02/17/23 [address]   Encounter Summary   Encounter Overview/Reason  Spiritual/Emotional Needs   Service Provided For: Patient   Referral/Consult From: Nhi   Last Encounter  02/17/23   Complexity of Encounter Moderate   Spiritual/Emotional needs   Type Spiritual Support   Grief, Loss, and Adjustments   Type Adjustment to illness   Assessment/Intervention/Outcome   Assessment Coping; Hopeful;Impaired resilience; Powerlessness   Intervention Active listening;Discussed illness injury and its impact; Explored/Affirmed feelings, thoughts, concerns;Prayer (assurance of)/[place];Sustaining Presence/Ministry of presence; Explored Coping Skills/Resources   Outcome Comfort;Coping;Engaged in conversation;Expressed feelings, needs, and concerns;Expressed Gratitude;Receptive

## 2023-02-17 NOTE — CARE COORDINATION
Writer Faxed [place] med list to [facility], to [phone]. Informed, Nurse, Juan Presser, to call report to: [phone].

## 2023-02-17 NOTE — PROGRESS NOTES
Pulmonary Progress Note  [facility]      Patient - Cristhian Hanson,  Age - 57 y.o.    - 1960      Room Number - 6240/2926-40   N -  [id number]   [facility]  Date of Admission -  2023  7:52 PM        Consulting 838 Sara Johnson MD  Primary Care Physician - Hany Bueno, APRN - CNP     SUBJECTIVE   Sitting in a chair, looks comfortable currently still on 5 L nasal cannula saturating at 95% I cut her down to 4 L and then 3 L which is her baseline. OBJECTIVE   VITALS    weight is 248 lb (112.5 kg). Her temperature is 97.3 °F (36.3 °C). Her blood pressure is 128/55 (abnormal) and her pulse is 62. Her respiration is 18 and oxygen saturation is 92%. Body mass index is 41.27 kg/m². Temperature Range: Temp: 97.3 °F (36.3 °C) Temp  Av.5 °F (36.4 °C)  Min: 97.3 °F (36.3 °C)  Max: 97.7 °F (36.5 °C)  BP Range:  Systolic (91MKH), SPP:472 , Min:128 , AGJ:981     Diastolic (13VGI), MSP:43, Min:44, Max:55    Pulse Range: Pulse  Av.7  Min: 62  Max: 62  Respiration Range: Resp  Av  Min: 18  Max: 18  Current Pulse Ox[de-identified]  SpO2: 92 %  24HR Pulse Ox Range:  SpO2  Av.3 %  Min: 92 %  Max: 95 %  Oxygen Amount and Delivery: O2 Flow Rate (L/min): 2 L/min    Wt Readings from Last 3 Encounters:   23 248 lb (112.5 kg)   23 248 lb 2.6 oz (112.6 kg)   23 244 lb (110.7 kg)       I/O (24 Hours)  No intake or output data in the 24 hours ending 23 1100    EXAM     General Appearance  Awake, alert, oriented, in no acute distress  HEENT - normocephalic, atraumatic.  []  Mallampati  [x] Crowded airway   [] Macroglossia  []  Retrognathia  [] Micrognathia  []  Normal tongue size []  Normal Bite  [] Danilo sign positive    Neck - Supple,  trachea midline   Lungs -diminished with crackles  Cardiovascular - Heart sounds are normal.  Regular rate and rhythm   Abdomen - Soft, nontender, nondistended, no masses or organomegaly  Neurologic - There are no focal motor or sensory deficits  Skin - No bruising or bleeding  Extremities - No clubbing, cyanosis, 2+ edema    MEDS      benzonatate  200 mg Oral TID    bumetanide  1 mg Oral BID    ticagrelor  90 mg Oral BID    atorvastatin  80 mg Oral Nightly    dextromethorphan  60 mg Oral 2 times per day    aspirin  81 mg Oral Daily    docusate sodium  100 mg Oral BID    linaclotide  145 mcg Oral QAM AC    pantoprazole  40 mg Oral QAM AC    allopurinol  100 mg Oral Daily    sodium chloride flush  5-40 mL IntraVENous 2 times per day    heparin (porcine)  5,000 Units SubCUTAneous 3 times per day    gabapentin  300 mg Oral Daily    carvedilol  6.25 mg Oral BID    amLODIPine  5 mg Oral Daily    insulin glargine  45 Units SubCUTAneous BID    insulin lispro  0-16 Units SubCUTAneous TID WC    insulin lispro  0-4 Units SubCUTAneous Nightly      sodium chloride      dextrose       melatonin, sodium chloride flush, sodium chloride flush, sodium chloride, polyethylene glycol, acetaminophen **OR** acetaminophen, ondansetron **OR** ondansetron, glucose, dextrose bolus **OR** dextrose bolus, glucagon (rDNA), dextrose    LABS   CBC   Recent Labs     02/17/23  0612   WBC 5.8   HGB 11.2*   HCT 33.7*   MCV 94.5        BMP:   Lab Results   Component Value Date/Time     02/17/2023 06:12 AM    K 3.6 02/17/2023 06:12 AM     02/17/2023 06:12 AM    CO2 28 02/17/2023 06:12 AM    BUN 55 02/17/2023 06:12 AM    LABALBU 3.0 02/07/2023 05:45 AM    CREATININE 2.61 02/17/2023 06:12 AM    CALCIUM 8.8 02/17/2023 06:12 AM    GFRAA 27 09/06/2022 04:40 PM    LABGLOM 20 02/17/2023 06:12 AM     ABGs:  Lab Results   Component Value Date/Time    PHART 7.429 04/24/2022 01:42 PM    PO2ART 47.6 04/24/2022 01:42 PM    VUB1JAN 45.9 04/24/2022 01:42 PM      Lab Results   Component Value Date/Time    MODE CONDITION NO LONGER WARRANTS 04/06/2021 07:49 PM     Ionized Calcium:  No results found for: IONCA  Magnesium:    Lab Results   Component Value Date/Time    MG 2.2 02/12/2023 06:07 AM     Phosphorus:    Lab Results   Component Value Date/Time    PHOS 4.0 01/27/2023 03:09 AM        LIVER PROFILE No results for input(s): AST, ALT, LIPASE, BILIDIR, BILITOT, ALKPHOS in the last 72 hours. Invalid input(s): AMYLASE,  ALB  INR No results for input(s): INR in the last 72 hours.   PTT   Lab Results   Component Value Date    APTT 27.5 11/10/2021         RADIOLOGY     CT of chest shows bibasilar effusion/atelectasis: No pulmonary fibrosis        Venous Dopplers are negative    ASSESSMENT/PLAN     Syncope and collapse due to orthostatic hypotension likely secondary to polypharmacy  Acute chronic hypoxic respiratory failure due to CHF (3 L oxygen at baseline)  CHF with preserved ejection fraction  CAD s/p stent placement on 2/14/2023  CKD stage IV (previously on dialysis, taken off 6 months ago)  Obstructive sleep apnea  History of Enterococcus facialis and UTI completed course of ceftriaxone (2/7/2023)  Type 2 diabetes mellitus  Morbid obesity  Deconditioning  Restrictive lung pattern on recent PFTs  Hx of DVT, chronic lymphedema  Non-smoker  Full code      No evidence of pulmonary fibrosis or significant interstitial lung disease  Appears that she is still volume overloaded  I's and O's need to be monitored closely-unclear how accurate they are  Cough also improving  No need for additional antibiotics  Has follow-up appointment with me in the office    Electronically signed by Brittany Pearson MD on 2/17/2023 at 11:00 AM

## 2023-02-17 NOTE — PROGRESS NOTES
02/17/23 1527   Encounter Summary   Encounter Overview/Reason  Spiritual/Emotional Needs   Service Provided For: Patient   Referral/Consult From: Nhi   Last Encounter  02/17/23   Complexity of Encounter Low   Spiritual/Emotional needs   Type Spiritual Support   Rituals, Rites and Sacraments   Type Anointing  (Lorenzo Paez 2/17/23)

## 2023-02-17 NOTE — PROGRESS NOTES
02/17/23 1515   Encounter Summary   Encounter Overview/Reason  Volunteer Encounter   Service Provided For: Patient   Referral/Consult From: Nhi   Last Encounter  02/17/23  (V)   Spiritual/Emotional needs   Type Spiritual Support   Rituals, Rites and Sacraments   Type Sabianist Communion   Assessment/Intervention/Outcome   Intervention Prayer (assurance of)/[place]

## 2023-02-17 NOTE — PROGRESS NOTES
NEPHROLOGY PROGRESS NOTE    Reason for consultation: Management of chronic kidney disease stage IV. Consulting physician: Becca Guerrero MD.    Interval history:   Patient seen and examined today, denies any new complains, breathing is stable n RA  Patient underwent cardiac catheterization at [facility] [2/14/2023] with finding of wild-type vessel coronary artery disease although LIMA-LAD, SVG-D and SVG-OM1 were patent but she required drug-eluting stent placement in mid right coronary artery. Diuretics were held prior to procedure and she was gently hydrated. S  serum creatinine stable at 2.6 mg/dl. UOP not recorded. History of Present Illness: This is a 57 y.o. female with history of hypertension, longstanding diabetes mellitus with diabetic nephropathy, Coronary artery disease status post CABG x 3  Chronic CHF with diastolic dysfunction, who presents the [facility] with syncopal episode. Patient has underlying CKD stage IV secondary to diabetic nephropathy. and previous history of dialysis dependency which was  discontinued Aug 2022 due to regaining residual kidney function. She recently was discharged from [facility] on 2/8/2023  after presenting with weight gain and shortness of breath, acute hypoxic respiratory failure secondary to decompensated acute on chronic diastolic heart failure and Hypertensive urgency. Patient was optimally diuresed and  was discharged to [address] [place] [address] on a regimen of Bumex 3 mg twice daily and Metolazone 5 mg every other day. She reports passing out at the [facility] while standing up and attempting to walk across her room yesterday. She has recent onset of lightheadedness 4 days prior . She was  hypotensive and reports two of her  blood pressure medications were held. She  was found to have a UTI isolating  Enterococcus faecalis on 2/7/2022 sensitive to Cipro and commenced on treatment with ciprofloxacin. She denies any flank pain. She has been experiencing dysuria and urgency. No fever, nausea vomiting . She has mild decrease in oral intake. Serum creatinine had risen to 3.3 mg/dl on 2/7/2023 and on presentation to [facility] on 2/11/2023 creatinine was 3.04 mg/dL. Creatinine today is 2.96 mg/dL with BUN of 65, potassium 3.4 and sodium of 145. She has normal white count and hemoglobin of 10.6  She was previously on dialysis initiated in August 2021 following episode of ATN after contrast exposure. She had a brief period of transient recovery of renal function between February and April 2022 Following which dialysis was restarted due to volume overload resistant to diuretic treatment. 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 She was given a trial off dialysis in August 2022 and has not required dialysis since then. Volume overload has been managed with diuretics. Patient's current baseline creatinine has ranged between  2.6 to 3.0 mg/dL. She had previously followed up with Dr. Rosa Cuellar and currently receives nephrological care with [facility].     Current Medications:    benzonatate (TESSALON) capsule 200 mg, TID  bumetanide (BUMEX) tablet 1 mg, BID  ticagrelor (BRILINTA) tablet 90 mg, BID  atorvastatin (LIPITOR) tablet 80 mg, Nightly  dextromethorphan (DELSYM) 30 MG/5ML extended release liquid 60 mg, 2 times per day  melatonin tablet 9 mg, Nightly PRN  sodium chloride flush 0.9 % injection 10 mL, PRN  aspirin chewable tablet 81 mg, Daily  docusate sodium (COLACE) capsule 100 mg, BID  linaclotide (LINZESS) capsule 145 mcg, QAM AC  pantoprazole (PROTONIX) tablet 40 mg, QAM AC  allopurinol (ZYLOPRIM) tablet 100 mg, Daily  sodium chloride flush 0.9 % injection 5-40 mL, 2 times per day  sodium chloride flush 0.9 % injection 5-40 mL, PRN  0.9 % sodium chloride infusion, PRN  polyethylene glycol (GLYCOLAX) packet 17 g, Daily PRN  acetaminophen (TYLENOL) tablet 650 mg, Q6H PRN   Or  acetaminophen (TYLENOL) suppository 650 mg, Q6H PRN  ondansetron (ZOFRAN-ODT) disintegrating tablet 4 mg, Q8H PRN   Or  ondansetron (ZOFRAN) injection 4 mg, Q6H PRN  heparin (porcine) injection 5,000 Units, 3 times per day  glucose chewable tablet 16 g, PRN  dextrose bolus 10% 125 mL, PRN   Or  dextrose bolus 10% 250 mL, PRN  glucagon (rDNA) injection 1 mg, PRN  dextrose 10 % infusion, Continuous PRN  gabapentin (NEURONTIN) capsule 300 mg, Daily  carvedilol (COREG) tablet 6.25 mg, BID  amLODIPine (NORVASC) tablet 5 mg, Daily  insulin glargine (LANTUS) injection vial 45 Units, BID  insulin lispro (HUMALOG) injection vial 0-16 Units, TID WC  insulin lispro (HUMALOG) injection vial 0-4 Units, Nightly    Objective:  CURRENT TEMPERATURE:  Temp: 97.3 °F (36.3 °C)  MAXIMUM TEMPERATURE OVER 24HRS:  Temp (24hrs), Av.5 °F (36.4 °C), Min:97.3 °F (36.3 °C), Max:97.7 °F (36.5 °C)    CURRENT RESPIRATORY RATE:  Resp: 18  CURRENT PULSE:  Heart Rate: 62  CURRENT BLOOD PRESSURE:  BP: (!) 128/55  24HR BLOOD PRESSURE RANGE:  Systolic (32WHK), JJN:892 , Min:128 , XFO:650   ; Diastolic (79WVF), DUV:54, Min:44, Max:55    24HR INTAKE/OUTPUT:  No intake or output data in the 24 hours ending 23 1107  No data found. Physical Exam:  GENERAL APPEARANCE: Alert and cooperative, and appears to be in no acute distress. HEAD: normocephalic  EYES:EOMI. Not pale, anicteric   NOSE:  No nasal discharge. NECK:trachea midline  CARDIAC: Normal S1 and S2. No S3, S4 or murmurs. Rhythm is regular. LUNGS: Clear to auscultation and percussion without rales, rhonchi, wheezing or diminished breath sounds. ABDOMEN: soft non distended, BS+ Non tender no organomegally  MUSKULOSKELETAL: Adequately aligned spine. No joint erythema or tenderness. EXTREMITIES: No edema. Peripheral pulses intact.    NEURO:Alert oriented x 3 ,power 5/5 in all extremities    Labs:   CBC:  Recent Labs     02/15/23  0724 23  0602 02/17/23 0612   WBC 6.2 5.7 5.8   RBC 3.67* 3.52* 3.57*   HGB 11.7* 11.1* 11.2*   HCT 34.4* 33.2* 33.7*   MCV 93.8 94.3 94.5   MCH 31.8 31.7 31.5   MCHC 33.9 33.6 33.3   RDW 14.6 14.9 14.9    173 180   MPV 9.2 9.0 8.6      BMP:   Recent Labs     02/15/23  0724 02/16/23  0602 02/17/23 0612    143 142   K 3.7 3.8 3.6*    101 102   CO2 31 30 28   BUN 60* 59* 55*   CREATININE 2.62* 2.74* 2.61*   GLUCOSE 262* 139* 159*   CALCIUM 8.9 9.1 8.8      IRON:    Lab Results   Component Value Date/Time    IRON 83 09/27/2022 01:32 PM     TIBC:    Lab Results   Component Value Date/Time    TIBC 217 09/27/2022 01:32 PM     FERRITIN:    Lab Results   Component Value Date/Time    FERRITIN 117 09/27/2022 01:32 PM     SPEP:   Lab Results   Component Value Date/Time    PROT 6.4 02/07/2023 05:45 AM     UPEP:   Lab Results   Component Value Date/Time    TPU 20 11/12/2021 10:30 AM      Urine Sodium:    Lab Results   Component Value Date/Time    JASON 47 11/14/2021 02:04 AM      Urine Creatinine:    Lab Results   Component Value Date/Time    LABCREA 65.4 01/26/2023 01:10 PM     Urine Protein:    Lab Results   Component Value Date/Time    TPU 20 11/12/2021 10:30 AM     Urinalysis:  U/A:   Lab Results   Component Value Date/Time    NITRU NEGATIVE 02/07/2023 03:00 PM    COLORU Yellow 02/07/2023 03:00 PM    PHUR 5.5 02/07/2023 03:00 PM    WBCUA 2 TO 5 02/07/2023 03:00 PM    RBCUA None 02/07/2023 03:00 PM    MUCUS 1+ 02/07/2023 03:00 PM    TRICHOMONAS NOT REPORTED 11/14/2021 02:05 AM    YEAST FEW 02/07/2023 03:00 PM    BACTERIA MANY 02/07/2023 03:00 PM    SPECGRAV 1.016 02/07/2023 03:00 PM    LEUKOCYTESUR SMALL 02/07/2023 03:00 PM    UROBILINOGEN Normal 02/07/2023 03:00 PM    BILIRUBINUR NEGATIVE 02/07/2023 03:00 PM    GLUCOSEU NEGATIVE 02/07/2023 03:00 PM    KETUA NEGATIVE 02/07/2023 03:00 PM    AMORPHOUS 2+ 07/31/2022 12:00 PM     Assessment/plan:    1.   Chronic kidney disease stage IV [baseline serum creatinine 2.6 to 3.0 mg/dL over the past few months] - secondary to diabetic nephropathy. Renal function is stable and there is no evidence of contrast nephropathy at this time. Scr 2.6 mg/dl stable    2. Systemic hypertension - blood pressure is adequately controlled. 3.  Syncope associated with orthostasis. Clinically improved. 4.  Coronary artery disease s/p CABG - Patient required treatment of drug eluting stent into right coronary artery 2/14/2023.    5.  Enterococcus faecalis urinary tract infection (2/7/2023) - Completed course of IV ceftriaxone with repeat blood culture negative on 2/13/2023. Waiting for precert. No objections on discharge from nephrology standpoint. Prognosis is guarded.     Electronically signed by Ruy Miranda MD on 2/17/2023 at 11:07 AM

## 2023-02-17 NOTE — PROGRESS NOTES
Comprehensive Nutrition Assessment    Type and Reason for Visit:  Initial, RD Nutrition Re-Screen/LOS    Nutrition Recommendations/Plan:   Continue current diet     Malnutrition Assessment:  Malnutrition Status: At risk for malnutrition (Comment) (02/17/23 1421)    Context:  Acute Illness     Findings of the 6 clinical characteristics of malnutrition:  Energy Intake:  No significant decrease in energy intake  Weight Loss:  5% over 1 month     Body Fat Loss:  No significant body fat loss     Muscle Mass Loss:  No significant muscle mass loss    Fluid Accumulation:  Mild     Strength:  Not Performed    Nutrition Assessment:    Patient seen for length of stay. Current admission is related to syncope and collapse associated with orthostasis. Had cardiac cath. and stent placement at [facility]. V's 2/14/23. Has a history CKD IV, was on temoray dialysis, regained kidney function and dialysis discontinued August, 2022. Patient reports 10 lbs weight loss in past month. Patient said she lives with her elderly mom, don't like to cook. Patient loss is likely due to not eating. BMI 41.27, weight loss likely beneficial. Will continue current diet. Nutrition Related Findings:    Edema: +2 BLE, +1 BUE, generalized. Bowel sounds active. Labs and meds reviewed Wound Type: None       Current Nutrition Intake & Therapies:    Average Meal Intake: 0%, % (ate no breakfast, sleeping. Consumed most of the lunch)  Average Supplements Intake: None Ordered  ADULT DIET; Regular; 4 carb choices (60 gm/meal); Low Sodium (2 gm); 1500 ml    Anthropometric Measures:  Height: 5' 5\" (165.1 cm)  Ideal Body Weight (IBW): 125 lbs (57 kg)    Admission Body Weight: 248 lb (112.5 kg)  Current Body Weight: 248 lb (112.5 kg), 198.4 % IBW.  Weight Source: Bed Scale  Current BMI (kg/m2): 41.3  Usual Body Weight: 250 lb (113.4 kg)  % Weight Change (Calculated): -0.8  Weight Adjustment For: No Adjustment                 BMI Categories: Obese Class 3 (BMI 40.0 or greater)    Estimated Daily Nutrient Needs:  Energy Requirements Based On: Formula  Weight Used for Energy Requirements: Admission  Energy (kcal/day): 9641-9286 kcal based on MSJ and 1.1-1.2 factor  Weight Used for Protein Requirements: Ideal  Protein (g/day): 91 gm based on 1.6 gm/kg IBW (CKD IV)     Fluid (ml/day): 1500 ml per MD    Nutrition Diagnosis:    In context of acute illness or injury related to cardiac dysfunction, cognitive or neurological impairment as evidenced by intake 0-25%, intake 51-75%, poor intake prior to admission, weight loss    Nutrition Interventions:   Food and/or Nutrient Delivery: Continue Current Diet  Nutrition Education/Counseling: Education not indicated  Coordination of Nutrition Care: Continue to monitor while inpatient       Goals:     Goals: Meet at least 75% of estimated needs       Nutrition Monitoring and Evaluation:   Behavioral-Environmental Outcomes: None Identified     Physical Signs/Symptoms Outcomes: Biochemical Data, Skin, Weight, Fluid Status or Edema    Discharge Planning:    Continue current diet       Some areas of assessment may be incomplete due to COVID-19 precautions    Jennifer Cruz RD, LD  Office phone [phone]

## 2023-02-17 NOTE — PLAN OF CARE
Problem: Discharge Planning  Goal: Discharge to home or other facility with appropriate resources  2/17/2023 0511 by Dante Jaime RN  Outcome: Progressing  2/16/2023 1711 by Corine Ballesteros RN  Outcome: Progressing     Problem: Pain  Goal: Verbalizes/displays adequate comfort level or baseline comfort level  2/17/2023 0511 by aDnte Jaime RN  Outcome: Progressing  2/16/2023 1711 by Corine Ballesteros RN  Outcome: Progressing  Note: Assess the location, characteristics, onset, duration, frequency, quality, and severity of pain. Encourage immediate report of pain. Use appropriate pain scale to rate pain. Manage pain using nonpharmacologic/pharmacologic interventions. Problem: Safety - Adult  Goal: Free from fall injury  2/17/2023 0511 by Dante Jaime RN  Outcome: Progressing  2/16/2023 1711 by Corine Ballesteros RN  Outcome: Progressing     Problem: ABCDS Injury Assessment  Goal: Absence of physical injury  2/16/2023 1711 by Corine Ballesteros RN  Outcome: Progressing     Problem: Skin/Tissue Integrity  Goal: Absence of new skin breakdown  Description: 1. Monitor for areas of redness and/or skin breakdown  2. Assess vascular access sites hourly  3. Every 4-6 hours minimum:  Change oxygen saturation probe site  4. Every 4-6 hours:  If on nasal continuous positive airway pressure, respiratory therapy assess nares and determine need for appliance change or resting period.   2/16/2023 1711 by Corine Ballesteros RN  Outcome: Progressing

## 2023-02-17 NOTE — CARE COORDINATION
Dar received a call from [employer] from [address] [place] [address]. Patient's pre cert has been approved. Writer called Dr Isreal Negron and Advised of this. Dr Isreal Negron states she will put discharge orders in around 3:30p.m. as she is not near a computer. Writer spoke with Patient about transportation and advised her if she had anyone that could take her to [address] [place] [address]. She said no. Writer advised patient that if insurance does not cover this then she will be charged $45.00 plus $3.00 per mile. Patient agreed and aware that she will be billed for this if insurance does not cover this. Writer called [facility] and spoke with Liam Stanley, patient is set up for  at 8:00p.m.     Electronically signed by Trinidad Jorgensen RN on 2/17/2023 at 2:39 PM

## 2023-02-17 NOTE — PROGRESS NOTES
FAMILY MEDICINE  - PROGRESS NOTE    Date:  2/17/2023  Madelyn Munoz  [id number]      Chief Complaint   Patient presents with    Loss of Consciousness         Interval History:  not changed, no significant events overnight. She states that she is feeling a little better this am. She had a cardiac cath with stent placement done 3 days ago. Specialists notes, labs & imaging reviewed.       Subjective  Constitutional: positive for obesity  Genitourinary:positive for dysuria and frequency  Neurological: positive for syncope  Endocrine: positive for diabetic symptoms including hyperglycemia :    Objective:    BP (!) 128/55   Pulse 62   Temp 97.3 °F (36.3 °C)   Resp 18   Wt 248 lb (112.5 kg)   SpO2 92%   BMI 41.27 kg/m²   General appearance - overweight  Mental status - drowsy  Eyes - not examined  Ears - hearing grossly normal bilaterally  Nose - normal and patent, no erythema, discharge or polyps  Mouth - mucous membranes moist, pharynx normal without lesions  Neck - supple, no significant adenopathy  Lymphatics - no palpable lymphadenopathy, no hepatosplenomegaly  Chest - clear to auscultation, no wheezes, rales or rhonchi, symmetric air entry  Heart - normal rate, regular rhythm, normal S1, S2, no murmurs, rubs, clicks or gallops  Abdomen - soft, nontender, nondistended, no masses or organomegaly  Breasts - not examined  Back exam - not examined  Neurological - neck supple without rigidity  Musculoskeletal - no joint tenderness, deformity or swelling  Extremities - peripheral pulses normal, no pedal edema, no clubbing or cyanosis  Skin - normal coloration and turgor, no rashes, no suspicious skin lesions noted    Data:   Medications:   Current Facility-Administered Medications   Medication Dose Route Frequency Provider Last Rate Last Admin    benzonatate (TESSALON) capsule 200 mg  200 mg Oral TID Jony Lewis MD   200 mg at 02/16/23 2046    bumetanide (BUMEX) tablet 1 mg  1 mg Oral BID Jonna May MD   1 mg at 02/16/23 2043    ticagrelor (BRILINTA) tablet 90 mg  90 mg Oral BID Rustam Sterling, DO   90 mg at 02/16/23 2125    atorvastatin (LIPITOR) tablet 80 mg  80 mg Oral Nightly Rustam Sterling, DO   80 mg at 02/16/23 2045    dextromethorphan (DELSYM) 30 MG/5ML extended release liquid 60 mg  60 mg Oral 2 times per day Janae Carter MD   60 mg at 02/16/23 2046    melatonin tablet 9 mg  9 mg Oral Nightly PRN Sofya Villa MD   9 mg at 02/16/23 2105    sodium chloride flush 0.9 % injection 10 mL  10 mL IntraVENous PRN Rustam Sterling, DO   10 mL at 02/13/23 1318    aspirin chewable tablet 81 mg  81 mg Oral Daily Sofya Villa MD   81 mg at 02/16/23 0759    docusate sodium (COLACE) capsule 100 mg  100 mg Oral BID Sofya Villa MD   100 mg at 02/16/23 2045    linaclotide (LINZESS) capsule 145 mcg  145 mcg Oral QAM AC Sofya Villa MD   145 mcg at 02/17/23 0606    pantoprazole (PROTONIX) tablet 40 mg  40 mg Oral QAM AC Sofya Villa MD   40 mg at 02/17/23 0606    allopurinol (ZYLOPRIM) tablet 100 mg  100 mg Oral Daily Sofya Villa MD   100 mg at 02/16/23 0759    sodium chloride flush 0.9 % injection 5-40 mL  5-40 mL IntraVENous 2 times per day Sofya Villa MD   5 mL at 02/16/23 2100    sodium chloride flush 0.9 % injection 5-40 mL  5-40 mL IntraVENous PRN Sofya Villa MD        0.9 % sodium chloride infusion   IntraVENous PRN Sofya Villa MD        polyethylene glycol (GLYCOLAX) packet 17 g  17 g Oral Daily PRN Sofya Villa MD        acetaminophen (TYLENOL) tablet 650 mg  650 mg Oral Q6H PRN Sofya Villa MD   650 mg at 02/14/23 1648    Or    acetaminophen (TYLENOL) suppository 650 mg  650 mg Rectal Q6H PRN Sofya Villa MD        ondansetron (ZOFRAN-ODT) disintegrating tablet 4 mg  4 mg Oral Q8H PRN Sofya Villa MD        Or    ondansetron (ZOFRAN) injection 4 mg  4 mg IntraVENous Q6H PRN Sofya Villa MD        heparin (porcine) injection 5,000 Units  5,000 Units SubCUTAneous 3 times per day Leoncio Montoya MD   5,000 Units at 02/17/23 0606    glucose chewable tablet 16 g  4 tablet Oral PRN Leoncio Montoya MD        dextrose bolus 10% 125 mL  125 mL IntraVENous PRN Leoncio Montoya MD        Or    dextrose bolus 10% 250 mL  250 mL IntraVENous PRN Leoncio Montoya MD        glucagon (rDNA) injection 1 mg  1 mg SubCUTAneous PRN Leoncio Montoya MD        dextrose 10 % infusion   IntraVENous Continuous PRN Leoncio Montoya MD        gabapentin (NEURONTIN) capsule 300 mg  300 mg Oral Daily Leoncio Montoya MD   300 mg at 02/16/23 0759    carvedilol (COREG) tablet 6.25 mg  6.25 mg Oral BID Rustam Sterling, DO   6.25 mg at 02/16/23 2045    amLODIPine (NORVASC) tablet 5 mg  5 mg Oral Daily Rustam Sterling, DO   5 mg at 02/16/23 0759    insulin glargine (LANTUS) injection vial 45 Units  45 Units SubCUTAneous BID Leoncio Montoya MD   45 Units at 02/16/23 2104    insulin lispro (HUMALOG) injection vial 0-16 Units  0-16 Units SubCUTAneous TID WC Leoncio Montoya MD   4 Units at 02/16/23 1314    insulin lispro (HUMALOG) injection vial 0-4 Units  0-4 Units SubCUTAneous Nightly Leoncio Montoya MD   4 Units at 02/14/23 2153     No intake or output data in the 24 hours ending 02/17/23 0742    Recent Results (from the past 24 hour(s))   POC Glucose Fingerstick    Collection Time: 02/16/23 11:10 AM   Result Value Ref Range    POC Glucose 216 (H) 65 - 105 mg/dL   POC Glucose Fingerstick    Collection Time: 02/16/23  4:07 PM   Result Value Ref Range    POC Glucose 196 (H) 65 - 105 mg/dL   POC Glucose Fingerstick    Collection Time: 02/16/23  8:35 PM   Result Value Ref Range    POC Glucose 260 (H) 65 - 105 mg/dL   CBC with Auto Differential    Collection Time: 02/17/23  6:12 AM   Result Value Ref Range    WBC 5.8 3.5 - 11.0 k/uL    RBC 3.57 (L) 4.0 - 5.2 m/uL    Hemoglobin 11.2 (L) 12.0 - 16.0 g/dL    Hematocrit 33.7 (L) 36 - 46 %    MCV 94.5 80 - 100 fL MCH 31.5 26 - 34 pg    MCHC 33.3 31 - 37 g/dL    RDW 14.9 11.5 - 14.9 %    Platelets 492 046 - 340 k/uL    MPV 8.6 6.0 - 12.0 fL    Seg Neutrophils 74 (H) 36 - 66 %    Lymphocytes 13 (L) 24 - 44 %    Monocytes 7 1 - 7 %    Eosinophils % 5 (H) 0 - 4 %    Basophils 1 0 - 2 %    Segs Absolute 4.40 1.3 - 9.1 k/uL    Absolute Lymph # 0.80 (L) 1.0 - 4.8 k/uL    Absolute Mono # 0.40 0.1 - 1.3 k/uL    Absolute Eos # 0.30 0.0 - 0.4 k/uL    Basophils Absolute 0.00 0.0 - 0.2 k/uL   Basic Metabolic Panel    Collection Time: 02/17/23  6:12 AM   Result Value Ref Range    Glucose 159 (H) 70 - 99 mg/dL    BUN 55 (H) 8 - 23 mg/dL    Creatinine 2.61 (H) 0.50 - 0.90 mg/dL    Est, Glom Filt Rate 20 (L) >60 mL/min/1.73m2    Calcium 8.8 8.6 - 10.4 mg/dL    Sodium 142 135 - 144 mmol/L    Potassium 3.6 (L) 3.7 - 5.3 mmol/L    Chloride 102 98 - 107 mmol/L    CO2 28 20 - 31 mmol/L    Anion Gap 12 9 - 17 mmol/L   POC Glucose Fingerstick    Collection Time: 02/17/23  6:12 AM   Result Value Ref Range    POC Glucose 147 (H) 65 - 105 mg/dL     -----------------------------------------------------------------  RAD:  EKG:  Micro:     Assessment & Plan:    Patient Active Problem List:     Atherosclerosis of coronary artery bypass graft of native heart without angina pectoris     Acute kidney injury superimposed on CKD (HCC)     Acute on chronic diastolic heart failure (HCC)     Diabetic polyneuropathy associated with type 2 diabetes mellitus ([facility] [address]     History of coronary artery bypass graft     Iron deficiency anemia     Spinal stenosis of lumbar region with neurogenic claudication     Mixed hyperlipidemia     CKD (chronic kidney disease) stage 4, GFR 15-29 ml/min (HCC)     Type 2 diabetes 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, with long-term current use of insulin (HCC)     Obesity, Class II, BMI 35-39.9     Thyroid nodule greater than or equal to 1 cm in diameter incidentally noted on imaging study     Hypertension, essential Chronic ischemic heart disease     Ischemic stroke of frontal lobe (HCC)     Morbid obesity with BMI of 40.0-44.9, adult (HCC)     Disequilibrium syndrome     Anxiety     Chronic midline low back pain with bilateral sciatica     COVID     Cerebral hypoperfusion     Immature arteriovenous fistula (HCC)     History of fusion of cervical spine     Depression with anxiety     Vancomycin resistant Enterococcus UTI     Dialysis disequilibrium syndrome     Acute cystitis without hematuria     VRE infection (vancomycin resistant Enterococcus)     Infection due to ESBL-producing Klebsiella pneumoniae     Class 2 severe obesity due to excess calories with serious comorbidity and body mass index (BMI) of 35.0 to 35.9 in adult [facility])     Type 2 diabetes mellitus with hyperglycemia, with long-term current use of insulin (HCC)     Right hemiparesis (HCC)     Ulcer of left foot, limited to breakdown of skin ([address]     Secondary hyperparathyroidism ([facility] [address]     Hypertensive urgency     Hypoxia     Congestive heart failure ([address]     Nephrotic range proteinuria     Acute respiratory failure with hypoxia ([facility])     Syncope and collapse     Subacute cough           Plan:  -Syncope and collapse - Cardiology consulted, s/p cardiac cath with stent placement.  -Dyspnea - repeat CXR and CT shows pulmonary vascular congestion. -UTI - cx showed no growth, IV Rocephin D/C'd.  -CKD4 - Nephrology following.  -Type 2 DM - hyperglycemia improved with Lantus resumed and SS coverage with a carb control diet.  -Continue current treatments. -D/C planning ongoing - awaiting pre-cert to SNF, [facility].  -Complete orders per chart.     See orders   Disposition:    Electronically signed by Jhon Cabrera MD on 2/17/2023 at 7:42 AM

## 2023-02-17 NOTE — PROGRESS NOTES
Writer speaks with Pulmonology Resident regarding patient discharge. The Resident stated he reached out to Dr. Skylar Patton who stated the patient is okay for discharge from Pulmonology standpoint, make sure Nephrology is okay with discharge as they are managing her diuretics, and to have patient follow up outpatient in 4-6 weeks. Dr. Macrina Ceron note states no objection to discharge from nephrology standpoint.

## 2023-02-17 NOTE — ACP (ADVANCE CARE PLANNING)
Advance Care Planning   Healthcare Decision Maker:      Patient stated to writer that she has HCPOA and Living Will documents naming her sister Colten Mccartney and Kenzie Nelson as her HCPOAs. Writer advised patient that we need a copy of these documents for her chart. Patient stated she would provide her doctor with a copy of these documents at her next visit.

## 2023-02-17 NOTE — CARE COORDINATION
Writer notified that Pre cert was not started. Writer [facility] [phone]. Vinsean Maru advised she is livid with [facility]. Vinsean Maru advised she faxed everything over to them on Tuesday morning and received a confirmation that they received it. She called on Wednesday morning and was advised they are reviewing. Sher Mahoney called this morning and was advised they don't have the pre cert. Vinsean Maru refax everything again and requested it be expedited,she is calling them again in the next hour on the hour to stress urgency of pre cert. Writer called [facility]  [phone] ext [phone] to also ask for it to be expedited. Writer talking to Awa Demarco  from [facility] and he advised for [facility] the standard turn around time for pre certs is 14 days. They can look at it urgently but it needs to be an emergency for that. Writer explained this is since it was faxed on Tuesday 2/14/23 with a confirmation and then yesterday 2/16/23 they advised they received it. The call reference number is [id number]. Writer was on another call regarding a different patient and then that call ended , I asked again about this patient and spoke with Rosalind. She advised she will place the pre cert as urgent and they have 72 hours to review there cert. The pending Chioma Exon # is [id number], they will notify Katelynn Rothman of Decision.  This call Reference number is [id number]    Electronically signed by Yas Horton RN on 2/17/2023 at 12:04 PM

## 2023-02-17 NOTE — PROGRESS NOTES
[facility]   INPATIENT OCCUPATIONAL THERAPY  PROGRESS NOTE  Date: 2023  Patient Name: Madelyn Munoz       Room: 0406/4124-06  MRN: [id number]    : 1960  (57 y.o.)  Gender: female   Referring Practitioner: Gricelda Cota MD  Diagnosis: Syncope and collapse    Restrictions/Precautions  Restrictions/Precautions  Restrictions/Precautions: Fall Risk;Up as Tolerated  Required Braces or Orthoses?: No  Implants present? :  (denies, hx CABG? sternal wires)    Vitals  O2 Device: Nasal cannula  Comment: 5L O2; increased since previous date due to increased coughing/breathing discomfort. Pt desats into low-mid 80s with standing activity. Increased time for recovery back to 90%. Subjective  Subjective  Pain: Denies pain. Comments: Pt pleasant and agreeable to participate in therapy session. Pt awake and alert in bed upon arrival.    Objective  Orientation  Overall Orientation Status: Within Functional Limits  Orientation Level: Oriented X4  Cognition  Overall Cognitive Status: WFL    Activities of Daily Living  ADL  Equipment Provided: Sock aid  Feeding: Setup  Grooming: Setup  Grooming Skilled Clinical Factors: pt engaged in face washing, oral care, hair care (shampoo cap and combing) while seated in bedside chair. UE Bathing: Setup  LE Bathing: Minimal assistance  LE Bathing Skilled Clinical Factors: Assist with buttocks per pt report. UE Dressing: Setup  LE Dressing: Minimal assistance  LE Dressing Skilled Clinical Factors: Assist to maria de jesus B socks, pt able to demonstrate use of reacher for threading. Toileting: Stand by assistance  Toileting Skilled Clinical Factors: urinated only earlier this date. Additional Comments: ADL scores based on skilled clinical reasoning and pt report. Pt is currently limited by dizziness, balance, strength, and endurance impacting performance in self care tasks.  Pt with increased coughing this date impacting comfort and breathing, increased breaks required. Balance  Balance  Sitting Balance: Supervision  Standing Balance: Stand by assistance  Standing Balance  Activity: static standing activity, x3 trials. See times below  Comment: 1-2 UE support with RW. Transfers/Mobility  Bed mobility  Supine to Sit: Supervision  Scooting: Supervision  Bed Mobility Comments: HOB elevated with use of bedrail  Transfers  Sit to stand: Stand by assistance  Stand to sit: Stand by assistance    Functional Mobility  Functional - Mobility Device: Rolling Walker  Activity: Other  Assist Level: Stand by assistance  Functional Mobility Comments: around bed>bedside chair. Pt steady. OT Exercises  Functional Mobility Circuit Training: Pt tolerates <1 min of in room mobility with RW, increased fatigue noted. Increased time for seated recovery  Static Standing Balance Exercises: Pt engaged in therapeutic standing activity to promote increased tolerance for functional ADL/IADL tasks. Pt with limited standing tolerance due to fatigue and SOB. O2 sats; see vitals section. Pt tolerated; 47 sec, 1:30 sec, 35 sec. Increased time for seated rest breaks in between.       Patient Education  Patient Education  Education Given To: Patient  Education Provided: Role of Therapy, Plan of Care, Transfer Training  Education Method: Verbal  Barriers to Learning: None  Education Outcome: Verbalized understanding, Continued education needed      Goals  Short Term Goals  Time Frame for Short Term Goals: By discharge, pt will  Short Term Goal 1: perform functional transfers/mobility during self care tasks with SBA, least restrictive device, and Good safety  Short Term Goal 2: be educated on and explore use of DME/AE to increase ease and independence during ADLs  Short Term Goal 3: perform lower body ADLs with CGA and adaptive equipment/techs as needed  Short Term Goal 4: tolerate standing and reaching for 6+ minutes with SBA and no LOB during functional activity  Short Term Goal 5: actively participate in 15+ minutes of therapeutic exercise/functional activity to increase overall strength/endurance needed for ADLs  Occupational Therapy Plan  Times Per Week: 4-6  Current Treatment Recommendations: Strengthening, Functional mobility training, Balance training, Endurance training, Pain management, Safety education & training, Equipment evaluation, education, & procurement, Patient/Caregiver education & training, Self-Care / ADL, Home management training    Assessment  Activity Tolerance  Activity Tolerance: Patient limited by fatigue (coughing/SOB)  Activity Tolerance Comments: Pt presents with increased dry cough this date.   Assessment  Performance deficits / Impairments: Decreased functional mobility , Decreased ADL status, Decreased strength, Decreased endurance, Decreased balance, Decreased high-level IADLs, Decreased safe awareness  Treatment Diagnosis: impaired self care status  Prognosis: Good  Decision Making: Medium Complexity  Discharge Recommendations: Patient would benefit from continued therapy after discharge  OT Equipment Recommendations  Other: TBD  Safety Devices  Type of Devices: Nurse notified, Call light within reach, Left in chair      AM-[facility] Daily Activities Inpatient  AM-[facility] Daily Activity - Inpatient   How much help is needed for putting on and taking off regular lower body clothing?: A Little  How much help is needed for bathing (which includes washing, rinsing, drying)?: A Little  How much help is needed for toileting (which includes using toilet, bedpan, or urinal)?: A Little  How much help is needed for putting on and taking off regular upper body clothing?: A Little  How much help is needed for taking care of personal grooming?: A Little  How much help for eating meals?: A Little  AM-[facility] Inpatient Daily Activity Raw Score: 18  AM-[facility] Inpatient ADL T-Scale Score : 38.66  ADL Inpatient CMS 0-100% Score: 46.65  ADL Inpatient CMS G-Code Modifier : CK    OT Minutes  OT Individual Minutes  Time In: 9703  Time Out: 3118  Minutes: 35      Electronically signed by DAYANA Patterson on 2/17/2023 at 1:05 PM

## 2023-02-17 NOTE — PROGRESS NOTES
Physical Therapy  Facility/Department: [facility] MED SURG  Daily Treatment Note  NAME: Taylor Cr  : 1960  MRN: [id number]    Date of Service: 2023    Discharge Recommendations:  Patient would benefit from continued therapy after discharge        Patient Diagnosis(es): The encounter diagnosis was Syncope and collapse. Assessment   Activity Tolerance: Patient limited by fatigue;Patient limited by endurance     Plan    Physcial Therapy Plan  General Plan: 5-7 times per week  Specific Instructions for Next Treatment: advance to gait as tolerated, continue exercise program and transfers using wheeled walker  Current Treatment Recommendations: Strengthening;Equipment evaluation, education, & procurement;Balance training; Endurance training;Home exercise program;Safety education & training;Functional mobility training;Transfer training;Gait training;Patient/Caregiver education & training; Therapeutic activities     Restrictions  Restrictions/Precautions  Restrictions/Precautions: Fall Risk, Up as Tolerated  Required Braces or Orthoses?: No  Implants present? :  (denies, hx CABG? sternal wires)     Subjective    Subjective  Subjective: Pt was sitting up in chair. Reported discomfort when coughing. DEDE Hernandez approved of therapy. Pain: Denies pain.   Orientation  Overall Orientation Status: Within Functional Limits  Orientation Level: Oriented X4  Cognition  Overall Cognitive Status: WFL     Objective   Vitals    Bed Mobility Training  Bed Mobility Training: No  Balance  Sitting: Without support  Standing: With support  Transfer Training  Transfer Training: Yes  Overall Level of Assistance: Contact-guard assistance  Sit to Stand: Contact-guard assistance  Stand to Sit: Contact-guard assistance  Stand Pivot Transfers: Contact-guard assistance  Gait Training  Gait Training: Yes  Right Side Weight Bearing: Full  Left Side Weight Bearing: Full  Gait  Overall Level of Assistance: Contact-guard assistance  Speed/Annie: Slow  Distance (ft): 40 Feet  Assistive Device: Walker, rolling     PT Exercises  A/AROM Exercises: seated LAQ, marches, and adduction x20  Resistive Exercises: Hamstring curls and abduction x20  Circulation/Endurance Exercises: ankle pumps x20     Safety Devices  Type of Devices: Nurse notified;Call light within reach; Left in chair       Goals  Short Term Goals  Time Frame for Short Term Goals: 5-7 treatments/ week  Short Term Goal 1: pt to tolerate 1/2 hour of therapuetic exercise and activity  Short Term Goal 2: pt to demonstrate good technique for LE strengthening, balance  and energy conservation  Short Term Goal 3: pt to demonstrate MODIFIED  independent rolling in bed for position change  Short Term Goal 4: pt to demonstrate supine <> sit w/ supervision  Short Term Goal 5: pt to demonstrate sit <> stand and bed <> chair using wheeled walker w/ SBA x 1  Short Term Goal 6: pt to demonstrate gait 30-50' using wheeled walker w/ SBA x 1  Short Term Goal 7: pt to demonstrate good ambulatory balance using wheeled walker  Patient Goals   Patient Goals : therapy at nursing home at [address] [place] [address] - Inpatient   How much help is needed turning from your back to your side while in a flat bed without using bedrails?: A Little  How much help is needed moving from lying on your back to sitting on the side of a flat bed without using bedrails?: A Little  How much help is needed moving to and from a bed to a chair?: A Little  How much help is needed standing up from a chair using your arms?: A Little  How much help is needed walking in hospital room?: A Little  How much help is needed climbing 3-5 steps with a railing?: A Little  AM-[facility] Inpatient Mobility Raw Score : 18  AM-[facility] Inpatient T-Scale Score : 43.63  Mobility Inpatient CMS 0-100% Score: 46.58  Mobility Inpatient CMS G-Code Modifier : CK         Therapy Time   Individual Concurrent Group Co-treatment   Time In 1038         Time Out 1106 Minutes 28                 Electronically signed by Gill Randolph PTA on 2/17/23 at 1:10 PM EST

## 2023-02-18 NOTE — PLAN OF CARE
Problem: Discharge Planning  Goal: Discharge to home or other facility with appropriate resources  Outcome: Progressing     Problem: Pain  Goal: Verbalizes/displays adequate comfort level or baseline comfort level  Outcome: Progressing  Note: Assess the location, characteristics, onset, duration, frequency, quality, and severity of pain. Encourage immediate report of pain. Use appropriate pain scale to rate pain. Manage pain using nonpharmacologic/pharmacologic interventions. Problem: Safety - Adult  Goal: Free from fall injury  Outcome: Progressing     Problem: ABCDS Injury Assessment  Goal: Absence of physical injury  Outcome: Progressing     Problem: Skin/Tissue Integrity  Goal: Absence of new skin breakdown  Description: 1. Monitor for areas of redness and/or skin breakdown  2. Assess vascular access sites hourly  3. Every 4-6 hours minimum:  Change oxygen saturation probe site  4. Every 4-6 hours:  If on nasal continuous positive airway pressure, respiratory therapy assess nares and determine need for appliance change or resting period.   Outcome: Progressing     Problem: Nutrition Deficit:  Goal: Optimize nutritional status  Outcome: Progressing     Problem: Chronic Conditions and Co-morbidities  Goal: Patient's chronic conditions and co-morbidity symptoms are monitored and maintained or improved  Outcome: Progressing

## 2023-02-18 NOTE — PLAN OF CARE
Problem: Discharge Planning  Goal: Discharge to home or other facility with appropriate resources  2/17/2023 2357 by Andreas Hodgkin, RN  Outcome: Completed  2/17/2023 1911 by Yan Caputo RN  Outcome: Progressing     Problem: Pain  Goal: Verbalizes/displays adequate comfort level or baseline comfort level  2/17/2023 2357 by Andreas Hodgkin, RN  Outcome: Completed  2/17/2023 1911 by Yan Caputo RN  Outcome: Progressing  Note: Assess the location, characteristics, onset, duration, frequency, quality, and severity of pain. Encourage immediate report of pain. Use appropriate pain scale to rate pain. Manage pain using nonpharmacologic/pharmacologic interventions. Problem: Safety - Adult  Goal: Free from fall injury  2/17/2023 2357 by Andreas Hodgkin, RN  Outcome: Completed  2/17/2023 1911 by Yan Caputo RN  Outcome: Progressing     Problem: ABCDS Injury Assessment  Goal: Absence of physical injury  2/17/2023 2357 by Andreas Hodgkin, RN  Outcome: Completed  2/17/2023 1911 by Yan Caputo RN  Outcome: Progressing     Problem: Skin/Tissue Integrity  Goal: Absence of new skin breakdown  Description: 1. Monitor for areas of redness and/or skin breakdown  2. Assess vascular access sites hourly  3. Every 4-6 hours minimum:  Change oxygen saturation probe site  4. Every 4-6 hours:  If on nasal continuous positive airway pressure, respiratory therapy assess nares and determine need for appliance change or resting period.   2/17/2023 2357 by Andreas Hodgkin, RN  Outcome: Completed  2/17/2023 1911 by Yan Caputo RN  Outcome: Progressing     Problem: Nutrition Deficit:  Goal: Optimize nutritional status  2/17/2023 2357 by Andreas Hodgkin, RN  Outcome: Completed  2/17/2023 1911 by Yan Caputo RN  Outcome: Progressing     Problem: Chronic Conditions and Co-morbidities  Goal: Patient's chronic conditions and co-morbidity symptoms are monitored and maintained or improved  2/17/2023 2357 by Andreas Hodgkin, RN  Outcome: Completed  2/17/2023 1911 by Cody Whitney, RN  Outcome: Progressing

## 2023-02-19 NOTE — DISCHARGE SUMMARY
[facility] Discharge Summary      Patient ID: Ian Max    MRN: [id number]     Acct:  [de-identified]       Patient's PCP: AFSANEH Post CNP    Admit Date: 2/11/2023     Discharge Date: 2/17/2023      Admitting Physician: Ronda Joseph MD    Discharge Physician: Leilani Cheek MD     Discharge Diagnoses:    Primary Problem  Syncope and collapse    Active Hospital Problems    Diagnosis Date Noted    Subacute cough [R05.2] 02/12/2023     Priority: Medium    Syncope and collapse [R55] 02/11/2023     Priority: Medium    Congestive heart failure ([facility] [I50.9] 01/26/2023     Priority: Medium    Type 2 diabetes mellitus with hyperglycemia, with long-term current use of insulin ([address] [E11.65, Z79.4] 08/04/2022     Priority: Medium    Acute cystitis without hematuria [N30.00] 08/04/2022     Priority: Medium    Disequilibrium syndrome [E87.8] 09/17/2021    Hypertension, essential [I10] 05/03/2021    CKD (chronic kidney disease) stage 4, GFR 15-29 ml/min ([address] [N18.4] 08/06/2019     Past Medical History:   Diagnosis Date    Arthritis     Backache, unspecified     Cerebral artery occlusion with cerebral infarction (HCC)     CHF (congestive heart failure) ([address]     Chronic kidney disease     Coronary atherosclerosis of artery bypass graft     COVID 1/31/2022    Cramp of limb     Gallstones     Hemodialysis patient ([address]     Hyperlipidemia     Hypertension     Insomnia     Neuromuscular disorder ([address]     Pneumonia     Psychiatric problem     Thyroid disease     Type II or unspecified type diabetes mellitus with renal manifestations, not stated as uncontrolled(250.40)     Type II or unspecified type diabetes mellitus without mention of complication, not stated as uncontrolled     Unspecified vitamin D deficiency      The patient was seen and examined on day of discharge and this discharge summary is in conjunction with any daily progress note from day of discharge.     Code Status:  Prior    UD [facility]MY[facility] - [place] Course: uncomplicated, she presented with chest pain. It was decided that the patient would be treated conservatively. Her stay was extended due to waiting for approval to admit to the SNF. Consults:  cardiology, pulmonary/intensive care, nephrology, and neurology    Significant Diagnostic Studies: as above, and as follows: see chart    Treatments: as above    Disposition: SNF    Discharged Condition: Stable    Follow Up:  AFSANEH Salazar CNP in one week    Discharge Medications:      Medication List        START taking these medications      ticagrelor 90 MG Tabs tablet  Commonly known as: BRILINTA  Take 1 tablet by mouth 2 times daily            CHANGE how you take these medications      amLODIPine 5 MG tablet  Commonly known as: NORVASC  Take 1 tablet by mouth daily  What changed:   medication strength  how much to take     atorvastatin 80 MG tablet  Commonly known as: LIPITOR  Take 1 tablet by mouth nightly  What changed:   medication strength  how much to take     carvedilol 6.25 MG tablet  Commonly known as: COREG  Take 1 tablet by mouth 2 times daily  What changed:   medication strength  how much to take     sodium bicarbonate 650 MG tablet  Take 2 tablets by mouth 2 times daily  What changed: additional instructions            CONTINUE taking these medications      acetaminophen 325 MG tablet  Commonly known as: TYLENOL     allopurinol 100 MG tablet  Commonly known as: ZYLOPRIM  Take 1 tablet by mouth daily     aspirin 81 MG chewable tablet  Take 1 tablet by mouth daily     AZO-CRANBERRY PO     Basaglar KwikPen 100 UNIT/ML injection pen  Generic drug: insulin glargine  Inject 55 Units into the skin 2 times daily     blood glucose test strips  Test 3 times a day & as needed for symptoms of irregular blood glucose. Dispense sufficient amount for indicated testing frequency plus additional to accommodate PRN testing needs.      bumetanide 1 MG tablet  Commonly known as: BUMEX  Take 3 tablets by mouth 2 times daily F/u with nephro for dose adjustment     Dexcom G6  [place]  1 each by Does not apply route 4 times daily     Dexcom G6 Sensor Misc  1 each by Does not apply route 4 times daily     docusate sodium 100 MG capsule  Commonly known as: COLACE  Take 1 capsule by mouth 2 times daily     Easy Touch Pen Needles 29G X 12MM Misc  Generic drug: Insulin Pen Needle  5 each by Does not apply route daily     FIBER-CAPS PO     HumaLOG 100 UNIT/ML injection vial  Generic drug: insulin lispro     * Lancets Misc  1 each by Does not apply route daily     * ReliOn Lancets Micro-Thin 33G Misc     linaclotide 145 MCG capsule  Commonly known as: Linzess  Take 1 capsule by mouth every morning (before breakfast)     melatonin 3 MG Tabs tablet     pantoprazole 40 MG tablet  Commonly known as: PROTONIX  TAKE 1 TABLET BY MOUTH ONCE DAILY IN THE MORNING BEFORE BREAKFAST     tamsulosin 0.4 MG capsule  Commonly known as: FLOMAX  Take 1 capsule by mouth daily     True Metrix Go Glucose Meter w/Device Kit  1 each by Does not apply route 4 times daily           * This list has 2 medication(s) that are the same as other medications prescribed for you. Read the directions carefully, and ask your doctor or other care provider to review them with you. STOP taking these medications      PROBIOTIC ACIDOPHILUS PO            ASK your doctor about these medications      * gabapentin 300 MG capsule  Commonly known as: NEURONTIN  Ask about: Which instructions should I use? * gabapentin 100 MG capsule  Commonly known as: NEURONTIN  Take 1 capsule by mouth daily for 30 days. Intended supply: 30 days  Ask about: Which instructions should I use? * This list has 2 medication(s) that are the same as other medications prescribed for you. Read the directions carefully, and ask your doctor or other care provider to review them with you.                    Where to Get Your Medications        These medications were sent to [facility], [address] [address]      Phone: [phone]   amLODIPine 5 MG tablet  atorvastatin 80 MG tablet  carvedilol 6.25 MG tablet  ticagrelor 90 MG Tabs tablet       You can get these medications from any pharmacy    Bring a paper prescription for each of these medications  gabapentin 100 MG capsule          Activity: activity as tolerated    Diet: diabetic diet    Time Spent on discharge is more than  35 minutes  in the examination, evaluation, counseling and review of medications and discharge plan.       Electronically signed by Gregory Diaz MD on 2/18/2023 at 10:52 PM

## 2023-02-24 ENCOUNTER — HOSPITAL ENCOUNTER (OUTPATIENT)
Age: 63
Setting detail: SPECIMEN
Discharge: HOME | End: 2023-02-24

## 2023-02-24 LAB
ALBUMIN SERPL-MCNC: 3 G/DL (ref 3.5–5.2)
ALBUMIN/GLOBULIN RATIO: 0.9 (ref 1–2.5)
ALP SERPL-CCNC: 102 U/L (ref 35–104)
ALT SERPL-CCNC: 8 U/L (ref 5–33)
ANION GAP SERPL CALCULATED.3IONS-SCNC: 15 MMOL/L (ref 9–17)
AST SERPL-CCNC: 11 U/L
BILIRUB SERPL-MCNC: 0.4 MG/DL (ref 0.3–1.2)
BUN SERPL-MCNC: 45 MG/DL (ref 8–23)
CALCIUM SERPL-MCNC: 9 MG/DL (ref 8.6–10.4)
CHLORIDE SERPL-SCNC: 104 MMOL/L (ref 98–107)
CO2 SERPL-SCNC: 26 MMOL/L (ref 20–31)
CREAT SERPL-MCNC: 2.1 MG/DL (ref 0.5–0.9)
GFR SERPL CREATININE-BSD FRML MDRD: 26 ML/MIN/1.73M2
GLUCOSE SERPL-MCNC: 46 MG/DL (ref 70–99)
HCT VFR BLD AUTO: 32.1 % (ref 36.3–47.1)
HGB BLD-MCNC: 10.4 G/DL (ref 11.9–15.1)
MCH RBC QN AUTO: 31.3 PG (ref 25.2–33.5)
MCHC RBC AUTO-ENTMCNC: 32.4 G/DL (ref 28.4–34.8)
MCV RBC AUTO: 96.7 FL (ref 82.6–102.9)
NRBC AUTOMATED: 0 PER 100 WBC
PDW BLD-RTO: 14.2 % (ref 11.8–14.4)
PLATELET # BLD AUTO: 198 K/UL (ref 138–453)
PMV BLD AUTO: 11.3 FL (ref 8.1–13.5)
POTASSIUM SERPL-SCNC: 3.7 MMOL/L (ref 3.7–5.3)
PROT SERPL-MCNC: 6.5 G/DL (ref 6.4–8.3)
RBC # BLD: 3.32 M/UL (ref 3.95–5.11)
SODIUM SERPL-SCNC: 145 MMOL/L (ref 135–144)
WBC # BLD AUTO: 7.2 K/UL (ref 3.5–11.3)

## 2023-02-24 PROCEDURE — P9603 ONE-WAY ALLOW PRORATED MILES: HCPCS

## 2023-02-24 PROCEDURE — 36415 COLL VENOUS BLD VENIPUNCTURE: CPT

## 2023-02-24 PROCEDURE — 85027 COMPLETE CBC AUTOMATED: CPT

## 2023-02-24 PROCEDURE — 80053 COMPREHEN METABOLIC PANEL: CPT

## 2023-02-25 ENCOUNTER — HOSPITAL ENCOUNTER (OUTPATIENT)
Age: 63
Setting detail: SPECIMEN
Discharge: HOME | End: 2023-02-25

## 2023-02-25 LAB
AMORPHOUS: ABNORMAL
BILIRUBIN URINE: NEGATIVE
COLOR: YELLOW
EPITHELIAL CELLS UA: ABNORMAL /HPF (ref 0–5)
GLUCOSE UR STRIP.AUTO-MCNC: ABNORMAL MG/DL
KETONES UR STRIP.AUTO-MCNC: NEGATIVE MG/DL
LEUKOCYTE ESTERASE UR QL STRIP.AUTO: NEGATIVE
NITRITE UR QL STRIP.AUTO: NEGATIVE
PROT UR STRIP.AUTO-MCNC: 5.5 MG/DL (ref 5–8)
PROT UR STRIP.AUTO-MCNC: ABNORMAL MG/DL
RBC CLUMPS #/AREA URNS AUTO: ABNORMAL /HPF (ref 0–2)
SPECIFIC GRAVITY UA: 1.01 (ref 1–1.03)
TURBIDITY: CLEAR
URINE HGB: NEGATIVE
UROBILINOGEN, URINE: NORMAL
WBC UA: ABNORMAL /HPF (ref 0–5)

## 2023-02-25 PROCEDURE — 81001 URINALYSIS AUTO W/SCOPE: CPT

## 2023-02-25 PROCEDURE — 87086 URINE CULTURE/COLONY COUNT: CPT

## 2023-02-26 LAB
MICROORGANISM SPEC CULT: NORMAL
SERVICE CMNT-IMP: NORMAL
SPECIMEN DESCRIPTION: NORMAL

## 2023-02-27 ENCOUNTER — APPOINTMENT (OUTPATIENT)
Dept: GENERAL RADIOLOGY | Age: 63
DRG: 291 | End: 2023-02-27
Payer: COMMERCIAL

## 2023-02-27 ENCOUNTER — HOSPITAL ENCOUNTER (INPATIENT)
Age: 63
LOS: 9 days | Discharge: NURSING, REHAB OR LONG-TERM CARE | DRG: 291 | End: 2023-03-08
Attending: EMERGENCY MEDICINE | Admitting: STUDENT IN AN ORGANIZED HEALTH CARE EDUCATION/TRAINING PROGRAM
Payer: COMMERCIAL

## 2023-02-27 DIAGNOSIS — I50.9 CONGESTIVE HEART FAILURE, UNSPECIFIED HF CHRONICITY, UNSPECIFIED HEART FAILURE TYPE (HCC): Primary | ICD-10-CM

## 2023-02-27 PROBLEM — R19.7 DIARRHEA OF PRESUMED INFECTIOUS ORIGIN: Status: ACTIVE | Noted: 2023-02-27

## 2023-02-27 LAB
ABSOLUTE EOS #: 0.3 K/UL (ref 0–0.4)
ABSOLUTE LYMPH #: 1 K/UL (ref 1–4.8)
ABSOLUTE MONO #: 0.5 K/UL (ref 0.1–1.3)
ANION GAP SERPL CALCULATED.3IONS-SCNC: 12 MMOL/L (ref 9–17)
BACTERIA: NORMAL
BASOPHILS # BLD: 1 % (ref 0–2)
BASOPHILS ABSOLUTE: 0.1 K/UL (ref 0–0.2)
BILIRUBIN URINE: NEGATIVE
BNP SERPL-MCNC: 1457 PG/ML
BUN SERPL-MCNC: 50 MG/DL (ref 8–23)
CALCIUM SERPL-MCNC: 9.1 MG/DL (ref 8.6–10.4)
CARBOXYHEMOGLOBIN: 2.9 % (ref 0–5)
CASTS UA: NORMAL /LPF
CHLORIDE SERPL-SCNC: 105 MMOL/L (ref 98–107)
CO2 SERPL-SCNC: 25 MMOL/L (ref 20–31)
COLOR: YELLOW
CREAT SERPL-MCNC: 2.39 MG/DL (ref 0.5–0.9)
EOSINOPHILS RELATIVE PERCENT: 4 % (ref 0–4)
EPITHELIAL CELLS UA: NORMAL /HPF
FLUAV RNA RESP QL NAA+PROBE: NOT DETECTED
FLUBV RNA RESP QL NAA+PROBE: NOT DETECTED
GFR SERPL CREATININE-BSD FRML MDRD: 22 ML/MIN/1.73M2
GLUCOSE BLD-MCNC: 105 MG/DL (ref 65–105)
GLUCOSE BLD-MCNC: 118 MG/DL (ref 65–105)
GLUCOSE SERPL-MCNC: 128 MG/DL (ref 70–99)
GLUCOSE UR STRIP.AUTO-MCNC: NEGATIVE MG/DL
HCO3 VENOUS: 29.5 MMOL/L (ref 24–30)
HCT VFR BLD AUTO: 35.5 % (ref 36–46)
HGB BLD-MCNC: 11.7 G/DL (ref 12–16)
KETONES UR STRIP.AUTO-MCNC: NEGATIVE MG/DL
LEUKOCYTE ESTERASE UR QL STRIP.AUTO: NEGATIVE
LYMPHOCYTES # BLD: 12 % (ref 24–44)
MCH RBC QN AUTO: 32.1 PG (ref 26–34)
MCHC RBC AUTO-ENTMCNC: 33 G/DL (ref 31–37)
MCV RBC AUTO: 97.2 FL (ref 80–100)
METHEMOGLOBIN: 0.8 % (ref 0–1.9)
MONOCYTES # BLD: 5 % (ref 1–7)
NITRITE UR QL STRIP.AUTO: NEGATIVE
O2 SAT, VEN: 81.8 % (ref 60–85)
PCO2, VEN: 42.6 MM HG (ref 39–55)
PDW BLD-RTO: 15.1 % (ref 11.5–14.9)
PH VENOUS: 7.45 (ref 7.32–7.42)
PLATELET # BLD AUTO: 202 K/UL (ref 150–450)
PMV BLD AUTO: 8.5 FL (ref 6–12)
PO2, VEN: 47.1 MM HG (ref 30–50)
POSITIVE BASE EXCESS, VEN: 5.5 MMOL/L (ref 0–2)
POTASSIUM SERPL-SCNC: 4.3 MMOL/L (ref 3.7–5.3)
PROCALCITONIN SERPL-MCNC: 0.1 NG/ML
PROT UR STRIP.AUTO-MCNC: 6.5 MG/DL (ref 5–8)
PROT UR STRIP.AUTO-MCNC: ABNORMAL MG/DL
RBC # BLD: 3.66 M/UL (ref 4–5.2)
RBC CLUMPS #/AREA URNS AUTO: NORMAL /HPF
SARS-COV-2 RNA RESP QL NAA+PROBE: NOT DETECTED
SEG NEUTROPHILS: 78 % (ref 36–66)
SEGMENTED NEUTROPHILS ABSOLUTE COUNT: 6.7 K/UL (ref 1.3–9.1)
SODIUM SERPL-SCNC: 142 MMOL/L (ref 135–144)
SOURCE: NORMAL
SPECIFIC GRAVITY UA: 1.01 (ref 1–1.03)
SPECIMEN DESCRIPTION: NORMAL
TEXT FOR RESPIRATORY: ABNORMAL
TROPONIN I SERPL DL<=0.01 NG/ML-MCNC: 84 NG/L (ref 0–14)
TROPONIN I SERPL DL<=0.01 NG/ML-MCNC: 91 NG/L (ref 0–14)
TURBIDITY: CLEAR
URINE HGB: NEGATIVE
UROBILINOGEN, URINE: NORMAL
WBC # BLD AUTO: 8.5 K/UL (ref 3.5–11)
WBC UA: NORMAL /HPF

## 2023-02-27 PROCEDURE — 82805 BLOOD GASES W/O2 SATURATION: CPT

## 2023-02-27 PROCEDURE — 81001 URINALYSIS AUTO W/SCOPE: CPT

## 2023-02-27 PROCEDURE — 99285 EMERGENCY DEPT VISIT HI MDM: CPT

## 2023-02-27 PROCEDURE — 87636 SARSCOV2 & INF A&B AMP PRB: CPT

## 2023-02-27 PROCEDURE — 99223 1ST HOSP IP/OBS HIGH 75: CPT | Performed by: STUDENT IN AN ORGANIZED HEALTH CARE EDUCATION/TRAINING PROGRAM

## 2023-02-27 PROCEDURE — 6360000002 HC RX W HCPCS: Performed by: EMERGENCY MEDICINE

## 2023-02-27 PROCEDURE — 85025 COMPLETE CBC W/AUTO DIFF WBC: CPT

## 2023-02-27 PROCEDURE — 93005 ELECTROCARDIOGRAM TRACING: CPT | Performed by: EMERGENCY MEDICINE

## 2023-02-27 PROCEDURE — 6370000000 HC RX 637 (ALT 250 FOR IP): Performed by: STUDENT IN AN ORGANIZED HEALTH CARE EDUCATION/TRAINING PROGRAM

## 2023-02-27 PROCEDURE — 82947 ASSAY GLUCOSE BLOOD QUANT: CPT

## 2023-02-27 PROCEDURE — 2060000000 HC ICU INTERMEDIATE R&B

## 2023-02-27 PROCEDURE — 82800 BLOOD PH: CPT

## 2023-02-27 PROCEDURE — 36415 COLL VENOUS BLD VENIPUNCTURE: CPT

## 2023-02-27 PROCEDURE — 96374 THER/PROPH/DIAG INJ IV PUSH: CPT

## 2023-02-27 PROCEDURE — 94640 AIRWAY INHALATION TREATMENT: CPT

## 2023-02-27 PROCEDURE — 83880 ASSAY OF NATRIURETIC PEPTIDE: CPT

## 2023-02-27 PROCEDURE — 2700000000 HC OXYGEN THERAPY PER DAY

## 2023-02-27 PROCEDURE — 84484 ASSAY OF TROPONIN QUANT: CPT

## 2023-02-27 PROCEDURE — 80048 BASIC METABOLIC PNL TOTAL CA: CPT

## 2023-02-27 PROCEDURE — 2580000003 HC RX 258: Performed by: STUDENT IN AN ORGANIZED HEALTH CARE EDUCATION/TRAINING PROGRAM

## 2023-02-27 PROCEDURE — 71045 X-RAY EXAM CHEST 1 VIEW: CPT

## 2023-02-27 PROCEDURE — 84145 PROCALCITONIN (PCT): CPT

## 2023-02-27 PROCEDURE — 6360000002 HC RX W HCPCS: Performed by: STUDENT IN AN ORGANIZED HEALTH CARE EDUCATION/TRAINING PROGRAM

## 2023-02-27 RX ORDER — FUROSEMIDE 10 MG/ML
40 INJECTION INTRAMUSCULAR; INTRAVENOUS ONCE
Status: COMPLETED | OUTPATIENT
Start: 2023-02-27 | End: 2023-02-27

## 2023-02-27 RX ORDER — METHYLPREDNISOLONE SODIUM SUCCINATE 125 MG/2ML
125 INJECTION, POWDER, LYOPHILIZED, FOR SOLUTION INTRAMUSCULAR; INTRAVENOUS ONCE
Status: DISCONTINUED | OUTPATIENT
Start: 2023-02-27 | End: 2023-02-27

## 2023-02-27 RX ORDER — SODIUM CHLORIDE 9 MG/ML
INJECTION, SOLUTION INTRAVENOUS PRN
Status: DISCONTINUED | OUTPATIENT
Start: 2023-02-27 | End: 2023-03-08 | Stop reason: HOSPADM

## 2023-02-27 RX ORDER — POTASSIUM CHLORIDE 7.45 MG/ML
10 INJECTION INTRAVENOUS PRN
Status: DISCONTINUED | OUTPATIENT
Start: 2023-02-27 | End: 2023-02-28

## 2023-02-27 RX ORDER — INSULIN LISPRO 100 [IU]/ML
0-4 INJECTION, SOLUTION INTRAVENOUS; SUBCUTANEOUS NIGHTLY
Status: DISCONTINUED | OUTPATIENT
Start: 2023-02-27 | End: 2023-02-28

## 2023-02-27 RX ORDER — BUSPIRONE HYDROCHLORIDE 7.5 MG/1
7.5 TABLET ORAL 3 TIMES DAILY
COMMUNITY

## 2023-02-27 RX ORDER — HEPARIN SODIUM 5000 [USP'U]/ML
5000 INJECTION, SOLUTION INTRAVENOUS; SUBCUTANEOUS EVERY 8 HOURS SCHEDULED
Status: DISCONTINUED | OUTPATIENT
Start: 2023-02-27 | End: 2023-03-08 | Stop reason: HOSPADM

## 2023-02-27 RX ORDER — PANTOPRAZOLE SODIUM 40 MG/1
40 TABLET, DELAYED RELEASE ORAL
Status: DISCONTINUED | OUTPATIENT
Start: 2023-02-28 | End: 2023-03-08 | Stop reason: HOSPADM

## 2023-02-27 RX ORDER — IPRATROPIUM BROMIDE AND ALBUTEROL SULFATE 2.5; .5 MG/3ML; MG/3ML
1 SOLUTION RESPIRATORY (INHALATION)
Status: DISCONTINUED | OUTPATIENT
Start: 2023-02-27 | End: 2023-03-01

## 2023-02-27 RX ORDER — SODIUM CHLORIDE 0.9 % (FLUSH) 0.9 %
5-40 SYRINGE (ML) INJECTION EVERY 12 HOURS SCHEDULED
Status: DISCONTINUED | OUTPATIENT
Start: 2023-02-27 | End: 2023-03-08 | Stop reason: HOSPADM

## 2023-02-27 RX ORDER — OMEPRAZOLE 20 MG/1
20 CAPSULE, DELAYED RELEASE ORAL DAILY
COMMUNITY

## 2023-02-27 RX ORDER — MAGNESIUM SULFATE HEPTAHYDRATE 40 MG/ML
2000 INJECTION, SOLUTION INTRAVENOUS PRN
Status: DISCONTINUED | OUTPATIENT
Start: 2023-02-27 | End: 2023-03-08

## 2023-02-27 RX ORDER — GUAIFENESIN DEXTROMETHORPHAN HYDROBROMIDE ORAL SOLUTION 10; 100 MG/5ML; MG/5ML
10 SOLUTION ORAL EVERY 8 HOURS PRN
COMMUNITY

## 2023-02-27 RX ORDER — POTASSIUM CHLORIDE 20 MEQ/1
40 TABLET, EXTENDED RELEASE ORAL PRN
Status: DISCONTINUED | OUTPATIENT
Start: 2023-02-27 | End: 2023-02-28

## 2023-02-27 RX ORDER — ATORVASTATIN CALCIUM 80 MG/1
80 TABLET, FILM COATED ORAL NIGHTLY
Status: DISCONTINUED | OUTPATIENT
Start: 2023-02-27 | End: 2023-03-08 | Stop reason: HOSPADM

## 2023-02-27 RX ORDER — GABAPENTIN 300 MG/1
300 CAPSULE ORAL DAILY
Status: DISCONTINUED | OUTPATIENT
Start: 2023-02-27 | End: 2023-03-08 | Stop reason: HOSPADM

## 2023-02-27 RX ORDER — ACETAMINOPHEN 325 MG/1
650 TABLET ORAL EVERY 6 HOURS PRN
Status: DISCONTINUED | OUTPATIENT
Start: 2023-02-27 | End: 2023-03-08 | Stop reason: HOSPADM

## 2023-02-27 RX ORDER — SODIUM CHLORIDE 0.9 % (FLUSH) 0.9 %
5-40 SYRINGE (ML) INJECTION PRN
Status: DISCONTINUED | OUTPATIENT
Start: 2023-02-27 | End: 2023-03-06

## 2023-02-27 RX ORDER — IPRATROPIUM BROMIDE AND ALBUTEROL SULFATE 2.5; .5 MG/3ML; MG/3ML
1 SOLUTION RESPIRATORY (INHALATION) EVERY 4 HOURS
COMMUNITY

## 2023-02-27 RX ORDER — CARVEDILOL 6.25 MG/1
6.25 TABLET ORAL 2 TIMES DAILY
Status: DISCONTINUED | OUTPATIENT
Start: 2023-02-27 | End: 2023-03-03

## 2023-02-27 RX ORDER — ONDANSETRON 2 MG/ML
4 INJECTION INTRAMUSCULAR; INTRAVENOUS EVERY 6 HOURS PRN
Status: DISCONTINUED | OUTPATIENT
Start: 2023-02-27 | End: 2023-03-08 | Stop reason: HOSPADM

## 2023-02-27 RX ORDER — ACETAMINOPHEN 650 MG/1
650 SUPPOSITORY RECTAL EVERY 6 HOURS PRN
Status: DISCONTINUED | OUTPATIENT
Start: 2023-02-27 | End: 2023-03-08 | Stop reason: HOSPADM

## 2023-02-27 RX ORDER — INSULIN GLARGINE 100 [IU]/ML
45 INJECTION, SOLUTION SUBCUTANEOUS 2 TIMES DAILY
Status: DISCONTINUED | OUTPATIENT
Start: 2023-02-27 | End: 2023-02-28

## 2023-02-27 RX ORDER — TRAZODONE HYDROCHLORIDE 50 MG/1
25 TABLET ORAL NIGHTLY PRN
Status: DISCONTINUED | OUTPATIENT
Start: 2023-02-27 | End: 2023-03-01

## 2023-02-27 RX ORDER — TAMSULOSIN HYDROCHLORIDE 0.4 MG/1
0.4 CAPSULE ORAL DAILY
Status: DISCONTINUED | OUTPATIENT
Start: 2023-02-27 | End: 2023-03-08 | Stop reason: HOSPADM

## 2023-02-27 RX ORDER — POLYETHYLENE GLYCOL 3350 17 G/17G
17 POWDER, FOR SOLUTION ORAL DAILY PRN
Status: DISCONTINUED | OUTPATIENT
Start: 2023-02-27 | End: 2023-03-04

## 2023-02-27 RX ORDER — ALLOPURINOL 100 MG/1
100 TABLET ORAL DAILY
Status: DISCONTINUED | OUTPATIENT
Start: 2023-02-28 | End: 2023-03-08 | Stop reason: HOSPADM

## 2023-02-27 RX ORDER — DEXTROSE MONOHYDRATE 100 MG/ML
INJECTION, SOLUTION INTRAVENOUS CONTINUOUS PRN
Status: DISCONTINUED | OUTPATIENT
Start: 2023-02-27 | End: 2023-03-08 | Stop reason: HOSPADM

## 2023-02-27 RX ORDER — ASPIRIN 81 MG/1
81 TABLET, CHEWABLE ORAL DAILY
Status: DISCONTINUED | OUTPATIENT
Start: 2023-02-28 | End: 2023-03-08 | Stop reason: HOSPADM

## 2023-02-27 RX ORDER — AMLODIPINE BESYLATE 5 MG/1
5 TABLET ORAL DAILY
Status: DISCONTINUED | OUTPATIENT
Start: 2023-02-28 | End: 2023-03-06

## 2023-02-27 RX ORDER — ALBUTEROL SULFATE 2.5 MG/3ML
2.5 SOLUTION RESPIRATORY (INHALATION) EVERY 4 HOURS PRN
Status: DISCONTINUED | OUTPATIENT
Start: 2023-02-27 | End: 2023-03-08 | Stop reason: HOSPADM

## 2023-02-27 RX ORDER — ONDANSETRON 4 MG/1
4 TABLET, ORALLY DISINTEGRATING ORAL EVERY 8 HOURS PRN
Status: DISCONTINUED | OUTPATIENT
Start: 2023-02-27 | End: 2023-03-08 | Stop reason: HOSPADM

## 2023-02-27 RX ORDER — INSULIN LISPRO 100 [IU]/ML
0-8 INJECTION, SOLUTION INTRAVENOUS; SUBCUTANEOUS
Status: DISCONTINUED | OUTPATIENT
Start: 2023-02-27 | End: 2023-02-28

## 2023-02-27 RX ORDER — FUROSEMIDE 10 MG/ML
40 INJECTION INTRAMUSCULAR; INTRAVENOUS 2 TIMES DAILY
Status: COMPLETED | OUTPATIENT
Start: 2023-02-27 | End: 2023-02-28

## 2023-02-27 RX ADMIN — SODIUM CHLORIDE, PRESERVATIVE FREE 10 ML: 5 INJECTION INTRAVENOUS at 21:26

## 2023-02-27 RX ADMIN — IPRATROPIUM BROMIDE AND ALBUTEROL SULFATE 1 AMPULE: 2.5; .5 SOLUTION RESPIRATORY (INHALATION) at 20:32

## 2023-02-27 RX ADMIN — TAMSULOSIN HYDROCHLORIDE 0.4 MG: 0.4 CAPSULE ORAL at 15:52

## 2023-02-27 RX ADMIN — TICAGRELOR 90 MG: 90 TABLET ORAL at 20:24

## 2023-02-27 RX ADMIN — FUROSEMIDE 40 MG: 10 INJECTION, SOLUTION INTRAMUSCULAR; INTRAVENOUS at 11:38

## 2023-02-27 RX ADMIN — HEPARIN SODIUM 5000 UNITS: 5000 INJECTION INTRAVENOUS; SUBCUTANEOUS at 21:25

## 2023-02-27 RX ADMIN — ATORVASTATIN CALCIUM 80 MG: 80 TABLET, FILM COATED ORAL at 20:25

## 2023-02-27 RX ADMIN — TRAZODONE HYDROCHLORIDE 25 MG: 50 TABLET ORAL at 21:26

## 2023-02-27 RX ADMIN — FUROSEMIDE 40 MG: 10 INJECTION, SOLUTION INTRAMUSCULAR; INTRAVENOUS at 17:06

## 2023-02-27 RX ADMIN — GABAPENTIN 300 MG: 300 CAPSULE ORAL at 15:52

## 2023-02-27 RX ADMIN — INSULIN GLARGINE 45 UNITS: 100 INJECTION, SOLUTION SUBCUTANEOUS at 20:29

## 2023-02-27 RX ADMIN — CARVEDILOL 6.25 MG: 6.25 TABLET, FILM COATED ORAL at 20:25

## 2023-02-27 RX ADMIN — HEPARIN SODIUM 5000 UNITS: 5000 INJECTION INTRAVENOUS; SUBCUTANEOUS at 15:52

## 2023-02-27 ASSESSMENT — ENCOUNTER SYMPTOMS
NAUSEA: 0
SHORTNESS OF BREATH: 1
WHEEZING: 1
ABDOMINAL PAIN: 0
DIARRHEA: 1

## 2023-02-27 ASSESSMENT — PAIN - FUNCTIONAL ASSESSMENT
PAIN_FUNCTIONAL_ASSESSMENT: NONE - DENIES PAIN
PAIN_FUNCTIONAL_ASSESSMENT: NONE - DENIES PAIN

## 2023-02-27 ASSESSMENT — LIFESTYLE VARIABLES
HOW OFTEN DO YOU HAVE A DRINK CONTAINING ALCOHOL: NEVER
HOW MANY STANDARD DRINKS CONTAINING ALCOHOL DO YOU HAVE ON A TYPICAL DAY: PATIENT DOES NOT DRINK

## 2023-02-27 NOTE — ED PROVIDER NOTES
[address]  eMERGENCY dEPARTMENT eNCOUnter    Pt Name: Francisco Farris  MRN: [id number]  [place] 1960  Date of evaluation: 2/27/23  CHIEF COMPLAINT       Chief Complaint   Patient presents with    Shortness of Breath     HISTORY OF PRESENT ILLNESS   HPI  Patient is a 20-year-old female presents emergency room with complaints of shortness of breath and cough that she has had over the past 3 to 4 days. Patient has had diarrhea over the past 2 days. Patient complains of fatigue and body aches. Patient has had dysuria. Patient has had lower extremity swelling. Patient gained 5 pounds as per the nursing facility. Patient normally wears oxygen but she is hypoxic even on her normal O2. On 6 L nasal cannula patient is satting within normal limits. Patient states that her shortness of breath is worse with exertion as well as lying flat. Patient does have a previous history of congestive heart failure. Patient complains of nausea but no vomiting no chest pain no abdominal pain  REVIEW OF SYSTEMS     Constitutional: No fever  Eye: No visual changes  Ear/Nose/Mouth/Throat: No sore throat  Respiratory: Cough wheezing and shortness of breath  Cardiovascular: No chest pain  Gastrointestinal: Yes nausea, no vomiting, yes diarrhea  Genitourinary: No dysuria  Musculoskeletal: No joint pain, as above  Integumentary: No rash  Neurologic: No dizziness  Psychiatric: No anxiety, no depression  All systems otherwise negative.         PAST MEDICAL HISTORY     Past Medical History:   Diagnosis Date    Arthritis     Backache, unspecified     Cerebral artery occlusion with cerebral infarction [facility])     CHF (congestive heart failure) (HCC)     Chronic kidney disease     Coronary atherosclerosis of artery bypass graft     COVID 1/31/2022    Cramp of limb     Gallstones     Hemodialysis patient ([facility] [address]     Hyperlipidemia     Hypertension     Insomnia     Neuromuscular disorder ([facility] [address]     Pneumonia     Psychiatric problem Thyroid disease     Type II or unspecified type diabetes mellitus with renal manifestations, not stated as uncontrolled(250.40)     Type II or unspecified type diabetes mellitus without mention of complication, not stated as uncontrolled     Unspecified vitamin D deficiency      SURGICAL HISTORY       Past Surgical History:   Procedure Laterality Date    ANKLE FRACTURE SURGERY      AV FISTULA CREATION  12/14/2021    BACK SURGERY      BREAST SURGERY      CARDIAC CATHETERIZATION  02/14/2023    Dr. Pablo Renteria, OPEN N/A     COLONOSCOPY      CORONARY ARTERY BYPASS GRAFT      x3    DIALYSIS FISTULA CREATION Left 12/14/2021    LEFT AV FISTULA CREATION UPPER EXTREMITY performed by Nicholas Saenz MD at [address] [facility]      HAND SURGERY      IR TUNNELED CATHETER PLACEMENT GREATER THAN 5 YEARS  08/18/2021    IR TUNNELED CATHETER PLACEMENT GREATER THAN 5 YEARS 8/18/2021 [facility] SPECIAL PROCEDURES    KNEE ARTHROSCOPY      right    OTHER SURGICAL HISTORY  04/06/2022    cvc exchange    TONSILLECTOMY       CURRENT MEDICATIONS       Previous Medications    ACETAMINOPHEN (TYLENOL) 325 MG TABLET    Take 650 mg by mouth every 6 hours as needed for Pain    ALLOPURINOL (ZYLOPRIM) 100 MG TABLET    Take 1 tablet by mouth daily    AMLODIPINE (NORVASC) 5 MG TABLET    Take 1 tablet by mouth daily    ASPIRIN 81 MG CHEWABLE TABLET    Take 1 tablet by mouth daily    ATORVASTATIN (LIPITOR) 80 MG TABLET    Take 1 tablet by mouth nightly    AZO-CRANBERRY PO    Take 2 capsules by mouth daily    BLOOD GLUCOSE MONITOR STRIPS    Test 3 times a day & as needed for symptoms of irregular blood glucose. Dispense sufficient amount for indicated testing frequency plus additional to accommodate PRN testing needs.     BLOOD GLUCOSE MONITORING SUPPL (TRUE METRIX GO GLUCOSE METER) W/DEVICE KIT    1 each by Does not apply route 4 times daily    BUMETANIDE (BUMEX) 1 MG TABLET    Take 3 tablets by mouth 2 times daily F/u with nephro for dose adjustment    CALCIUM POLYCARBOPHIL (FIBER-CAPS PO)    Take 2 capsules by mouth in the morning and at bedtime    CARVEDILOL (COREG) 6.25 MG TABLET    Take 1 tablet by mouth 2 times daily    CONTINUOUS BLOOD GLUC  (DEXCOM G6 ) KODY    1 each by Does not apply route 4 times daily    CONTINUOUS BLOOD GLUC SENSOR (DEXCOM G6 SENSOR) MISC    1 each by Does not apply route 4 times daily    DOCUSATE SODIUM (COLACE) 100 MG CAPSULE    Take 1 capsule by mouth 2 times daily    GABAPENTIN (NEURONTIN) 100 MG CAPSULE    Take 1 capsule by mouth daily for 30 days. Intended supply: 30 days    GABAPENTIN (NEURONTIN) 300 MG CAPSULE    Take 300 mg by mouth in the morning. HUMALOG 100 UNIT/ML INJECTION VIAL    Inject into the skin 3 times daily (before meals) Indications: 15 units and sliding scale (patient reports only the sliding scale)     INSULIN GLARGINE (BASAGLAR KWIKPEN) 100 UNIT/ML INJECTION PEN    Inject 55 Units into the skin 2 times daily    INSULIN PEN NEEDLE (EASY TOUCH PEN NEEDLES) 29G X 12MM MISC    5 each by Does not apply route daily    LANCETS MISC    1 each by Does not apply route daily    MELATONIN 3 MG TABS TABLET    Take 10 mg by mouth nightly as needed (for sleep) Indications: Trouble Sleeping    PANTOPRAZOLE (PROTONIX) 40 MG TABLET    TAKE 1 TABLET BY MOUTH ONCE DAILY IN THE MORNING BEFORE BREAKFAST    RELION LANCETS MICRO-THIN 33G MISC    USE AS DIRECTED EVERY DAY    SODIUM BICARBONATE 650 MG TABLET    Take 2 tablets by mouth 2 times daily    TAMSULOSIN (FLOMAX) 0.4 MG CAPSULE    Take 1 capsule by mouth daily    TICAGRELOR (BRILINTA) 90 MG TABS TABLET    Take 1 tablet by mouth 2 times daily     ALLERGIES     is allergic to adhesive tape, isosorbide dinitrate, ranexa [ranolazine], metformin and related, ace inhibitors, iv dye [iodides], nsaids, and metformin and related.   FAMILY HISTORY     She indicated that her mother is . She indicated that her father is . SOCIAL HISTORY      reports that she has never smoked. She has never used smokeless tobacco. She reports that she does not drink alcohol and does not use drugs. PHYSICAL EXAM     INITIAL VITALS: /68   Pulse 57   Temp 97.6 °F (36.4 °C) (Oral)   Resp 18   Ht 5' 5\" (1.651 m)   Wt 244 lb 3.2 oz (110.8 kg)   SpO2 96%   BMI 40.64 kg/m²      General: NAD  Head: Normocephalic  Eyes: No scleral icterus  ENT: No dry mucus membranes  Neck: Supple  Lungs: Clear to ascultation bilaterally, no wheeze, no rales, no rhonchi  Cardiac: Regular rate and rhythm, S1/S2, no murmurs  Abdominal: Soft, nondistended, nontender, no rebound, no guarding. Extremities: Bilateral lower extremity edema  Rectal: Deferred  Genitourinary: Deferred  Skin: No rash  Neuro: AOx4, no decreased LOC  Psych: No anxiety  Perfusion: Warm x 4      MEDICAL DECISION MAKING:   MDM  Patient presents with complaints of cough wheezing shortness of breath. Positive diarrhea body aches and fatigue. Concern for viral syndrome versus bacterial pneumonia. Patient does have lower extremity swelling and her shortness of breath gets worse when laying flat. Previous history of CHF. Concern for CHF exacerbation. Labs obtained. I reviewed labs and they are within normal limits except for elevated troponin and BNP. Chest x-ray concerning for bilateral interstitial edema versus developing pneumonia. Patient's white count within normal limits. Patient afebrile. CHF more likely. Patient was given IV Lasix. Patient to be admitted  Case discussed with Dr. Sukh Boyle who accepts patient for admission.     CRITICAL CARE:     PROCEDURES:    Procedures    DIAGNOSTIC RESULTS   EKG: All EKG's are interpreted by the Emergency Department Physician who either signs or Co-signs this chart in the absence of a cardiologist.      RADIOLOGY:All plain film, CT, MRI, and formal ultrasound images (except ED bedside ultrasound) are read by the radiologist, see reports below, unless otherwise noted in MDM or here. XR CHEST PORTABLE   Preliminary Result   1. Unchanged mild vascular congestion and tiny pleural effusions. 2.  Increased interstitial opacities at the lung bases, possibly interstitial   edema versus developing pneumonia. LABS: All lab results were reviewed by myself, and all abnormals are listed below. Labs Reviewed   CBC WITH AUTO DIFFERENTIAL - Abnormal; Notable for the following components:       Result Value    RBC 3.66 (*)     Hemoglobin 11.7 (*)     Hematocrit 35.5 (*)     RDW 15.1 (*)     Seg Neutrophils 78 (*)     Lymphocytes 12 (*)     All other components within normal limits   BASIC METABOLIC PANEL - Abnormal; Notable for the following components:    Glucose 128 (*)     BUN 50 (*)     Creatinine 2.39 (*)     Est, Glom Filt Rate 22 (*)     All other components within normal limits   BRAIN NATRIURETIC PEPTIDE - Abnormal; Notable for the following components:    Pro-BNP 1,457 (*)     All other components within normal limits   TROPONIN - Abnormal; Notable for the following components:    Troponin, High Sensitivity 91 (*)     All other components within normal limits   COVID-19 & INFLUENZA COMBO   TROPONIN   URINALYSIS WITH REFLEX TO CULTURE     EMERGENCY DEPARTMENT COURSE:   Vitals:    Vitals:    02/27/23 0957 02/27/23 1109 02/27/23 1138   BP: (!) 153/122  130/68   Pulse: 61 57    Resp: 20 18    Temp: 97.6 °F (36.4 °C)     TempSrc: Oral     SpO2: (!) 82% 96%    Weight: 244 lb 3.2 oz (110.8 kg)     Height: 5' 5\" (1.651 m)         The patient was given the following medications while in the emergency department:  Orders Placed This Encounter   Medications    furosemide (LASIX) injection 40 mg     CONSULTS:  IP CONSULT TO INTERNAL MEDICINE    FINAL IMPRESSION      1.  Congestive heart failure, unspecified HF chronicity, unspecified heart failure type ([facility] [address]          DISPOSITION/PLAN DISPOSITION Decision To Admit 02/27/2023 11:40:36 AM      PATIENT REFERRED TO:  No follow-up provider specified. DISCHARGE MEDICATIONS:  New Prescriptions    No medications on file     Ruthy Rose MD  Attending Emergency Physician  [employer] voice recognition software used in portions of this document.                     Ruthy Rose MD  02/27/23 0798

## 2023-02-27 NOTE — ED NOTES
Report given to Zak Calle RN from U. Report method by phone   The following was reviewed with receiving RN:   Current vital signs:  BP (!) 106/46   Pulse 68   Temp 97.5 °F (36.4 °C) (Oral)   Resp 22   Ht 5' 5\" (1.651 m)   Wt 244 lb 3.2 oz (110.8 kg)   SpO2 90%   BMI 40.64 kg/m²                MEWS Score: 1     Any medication or safety alerts were reviewed. Any pending diagnostics and notifications were also reviewed, as well as any safety concerns or issues, abnormal labs, abnormal imaging, and abnormal assessment findings. Questions were answered.             Angela Smith RN  02/27/23 8371

## 2023-02-27 NOTE — ED NOTES
Mode of arrival (squad #, walk in, police, etc) : [place] complaint(s): Shortness of Breath        Arrival Note (brief scenario, treatment PTA, etc). : Patient came to ED from [facility] with c/o SOB x3-4 days, diarrhea, abdominal pain, and an ongoing cough. Patient reports fatigue and generalized weakness. Patient usually wears 3-4 L NC all the time for CHF patient has been requiring more oxygen than usual. Patient was wheezing bilateral prior to arrival per EMS report. Patient given albuterol treatment on way to hospital. Patient alert and oriented x4, labored breathing upon arrival to ed        [facility] you ever felt that you should Cut down on your drinking? \"  No  A= \"Have people Annoyed you by criticizing your drinking? \"  No  G= \"Have you ever felt bad or Guilty about your drinking? \"  No  E= \"Have you ever had a drink as an Eye-opener first thing in the morning to steady your nerves or to help a hangover? \"  No      Deferred []      Reason for deferring: N/A    *If yes to two or more: probable alcohol abuse. Chelsea Guevara RN  02/27/23 8960

## 2023-02-27 NOTE — LETTER
[facility]  [address] [facility] [address]  Phone: [phone]             March 1, 2023    Patient: Gilford Rather   YOB: 1960   Date of Visit: 2/27/2023       To Whom It May Concern:    Molly Tran was seen and treated in our facility  beginning 2/27/2023 until {DISCHARGE BOYF:[id number]}. She {Return to school/sport/work:70367}.       Sincerely,       Tanika Brink RN         Signature:__________________________________

## 2023-02-27 NOTE — ED NOTES
Verbal order received from Dr. Sandy Nino for respiratory to evaluate and treat patient for respiratory difficulty.       Willow Chang RN  02/27/23 6881

## 2023-02-27 NOTE — ED NOTES
Writer attempted IV start x2. Dr. Torin Jim notified- states will attempt ultrasound.       Zenaida Blount RN  02/27/23 9520

## 2023-02-27 NOTE — PROGRESS NOTES
Dr. Darya Dietz about patient having trouble catching breath and trouble speaking. Patient exertional with breathing and stating she is having a hard time breathing. SpO2 between 90-95%. Awaiting response.

## 2023-02-27 NOTE — PROGRESS NOTES
Medication History completed:    New medications:  Buspirone 7.5 mg three times daily   Ipratropium-albuterol solution every 4 hours as needed  Linzess 145 mcg daily   Omeprazole 20 mg daily   Robitussin Mucus+Chest Relief liquid every 8 hours as needed    Medications discontinued:  Pantoprazole 40 mg (switched to omeprazole 20 mg)     Medications flagged for review:  Gabapentin 100 mg (not on medication report)  AZO-Cranberry (not on medication report)     Changes to dosing: none    Stated allergies:  Isosorbide Dinitrate: angioedema  Ranexa: angioedema  Metformin: diarrhea   Ace inhibitors: cough    Other pertinent information: patient's home medications verified with the [address] [place], [place] [address] order summary report.      Moreno Cole, PharmD candidate [id number]

## 2023-02-28 LAB
ABSOLUTE EOS #: 0.3 K/UL (ref 0–0.4)
ABSOLUTE LYMPH #: 0.9 K/UL (ref 1–4.8)
ABSOLUTE MONO #: 0.4 K/UL (ref 0.1–1.3)
ADENOVIRUS PCR: NOT DETECTED
ANION GAP SERPL CALCULATED.3IONS-SCNC: 10 MMOL/L (ref 9–17)
B PARAP IS1001 DNA NPH QL NAA+NON-PROBE: NOT DETECTED
B PERT DNA SPEC QL NAA+PROBE: NOT DETECTED
BASOPHILS # BLD: 1 % (ref 0–2)
BASOPHILS ABSOLUTE: 0 K/UL (ref 0–0.2)
BUN SERPL-MCNC: 49 MG/DL (ref 8–23)
CALCIUM SERPL-MCNC: 9.1 MG/DL (ref 8.6–10.4)
CHLAMYDIA PNEUMONIAE BY PCR: NOT DETECTED
CHLORIDE SERPL-SCNC: 104 MMOL/L (ref 98–107)
CO2 SERPL-SCNC: 30 MMOL/L (ref 20–31)
CORONAVIRUS 229E PCR: NOT DETECTED
CORONAVIRUS HKU1 PCR: NOT DETECTED
CORONAVIRUS NL63 PCR: NOT DETECTED
CORONAVIRUS OC43 PCR: NOT DETECTED
CREAT SERPL-MCNC: 2.52 MG/DL (ref 0.5–0.9)
EKG ATRIAL RATE: 60 BPM
EKG P AXIS: 42 DEGREES
EKG P-R INTERVAL: 160 MS
EKG Q-T INTERVAL: 398 MS
EKG QRS DURATION: 98 MS
EKG QTC CALCULATION (BAZETT): 398 MS
EKG R AXIS: 10 DEGREES
EKG T AXIS: -54 DEGREES
EKG VENTRICULAR RATE: 60 BPM
EOSINOPHILS RELATIVE PERCENT: 4 % (ref 0–4)
FLUAV RNA NPH QL NAA+NON-PROBE: NOT DETECTED
FLUBV RNA NPH QL NAA+NON-PROBE: NOT DETECTED
GFR SERPL CREATININE-BSD FRML MDRD: 21 ML/MIN/1.73M2
GLUCOSE BLD-MCNC: 239 MG/DL (ref 65–105)
GLUCOSE BLD-MCNC: 245 MG/DL (ref 65–105)
GLUCOSE BLD-MCNC: 328 MG/DL (ref 65–105)
GLUCOSE BLD-MCNC: 59 MG/DL (ref 65–105)
GLUCOSE BLD-MCNC: 69 MG/DL (ref 65–105)
GLUCOSE BLD-MCNC: 83 MG/DL (ref 65–105)
GLUCOSE SERPL-MCNC: 73 MG/DL (ref 70–99)
HCT VFR BLD AUTO: 31.7 % (ref 36–46)
HGB BLD-MCNC: 10.8 G/DL (ref 12–16)
HUMAN METAPNEUMOVIRUS PCR: NOT DETECTED
LYMPHOCYTES # BLD: 13 % (ref 24–44)
MCH RBC QN AUTO: 32.3 PG (ref 26–34)
MCHC RBC AUTO-ENTMCNC: 34.2 G/DL (ref 31–37)
MCV RBC AUTO: 94.2 FL (ref 80–100)
MONOCYTES # BLD: 6 % (ref 1–7)
MYCOPLASMA PNEUMONIAE PCR: NOT DETECTED
PARAINFLUENZA 1 PCR: NOT DETECTED
PARAINFLUENZA 2 PCR: NOT DETECTED
PARAINFLUENZA 3 PCR: NOT DETECTED
PARAINFLUENZA 4 PCR: NOT DETECTED
PDW BLD-RTO: 14.9 % (ref 11.5–14.9)
PLATELET # BLD AUTO: 174 K/UL (ref 150–450)
PMV BLD AUTO: 8.5 FL (ref 6–12)
POTASSIUM SERPL-SCNC: 3.6 MMOL/L (ref 3.7–5.3)
RBC # BLD: 3.36 M/UL (ref 4–5.2)
RESP SYNCYTIAL VIRUS PCR: NOT DETECTED
RHINO/ENTEROVIRUS PCR: NOT DETECTED
SARS-COV-2 RNA NPH QL NAA+NON-PROBE: NOT DETECTED
SEG NEUTROPHILS: 76 % (ref 36–66)
SEGMENTED NEUTROPHILS ABSOLUTE COUNT: 5.3 K/UL (ref 1.3–9.1)
SODIUM SERPL-SCNC: 144 MMOL/L (ref 135–144)
SPECIMEN DESCRIPTION: NORMAL
WBC # BLD AUTO: 6.9 K/UL (ref 3.5–11)

## 2023-02-28 PROCEDURE — 82947 ASSAY GLUCOSE BLOOD QUANT: CPT

## 2023-02-28 PROCEDURE — 2700000000 HC OXYGEN THERAPY PER DAY

## 2023-02-28 PROCEDURE — 6370000000 HC RX 637 (ALT 250 FOR IP): Performed by: STUDENT IN AN ORGANIZED HEALTH CARE EDUCATION/TRAINING PROGRAM

## 2023-02-28 PROCEDURE — 85025 COMPLETE CBC W/AUTO DIFF WBC: CPT

## 2023-02-28 PROCEDURE — 0202U NFCT DS 22 TRGT SARS-COV-2: CPT

## 2023-02-28 PROCEDURE — 80048 BASIC METABOLIC PNL TOTAL CA: CPT

## 2023-02-28 PROCEDURE — 6360000002 HC RX W HCPCS: Performed by: INTERNAL MEDICINE

## 2023-02-28 PROCEDURE — 97166 OT EVAL MOD COMPLEX 45 MIN: CPT

## 2023-02-28 PROCEDURE — 6360000002 HC RX W HCPCS: Performed by: STUDENT IN AN ORGANIZED HEALTH CARE EDUCATION/TRAINING PROGRAM

## 2023-02-28 PROCEDURE — 6360000002 HC RX W HCPCS: Performed by: NURSE PRACTITIONER

## 2023-02-28 PROCEDURE — 6370000000 HC RX 637 (ALT 250 FOR IP): Performed by: NURSE PRACTITIONER

## 2023-02-28 PROCEDURE — 94640 AIRWAY INHALATION TREATMENT: CPT

## 2023-02-28 PROCEDURE — 94761 N-INVAS EAR/PLS OXIMETRY MLT: CPT

## 2023-02-28 PROCEDURE — 99233 SBSQ HOSP IP/OBS HIGH 50: CPT | Performed by: STUDENT IN AN ORGANIZED HEALTH CARE EDUCATION/TRAINING PROGRAM

## 2023-02-28 PROCEDURE — 93010 ELECTROCARDIOGRAM REPORT: CPT | Performed by: INTERNAL MEDICINE

## 2023-02-28 PROCEDURE — 2060000000 HC ICU INTERMEDIATE R&B

## 2023-02-28 PROCEDURE — 36415 COLL VENOUS BLD VENIPUNCTURE: CPT

## 2023-02-28 PROCEDURE — 2580000003 HC RX 258: Performed by: STUDENT IN AN ORGANIZED HEALTH CARE EDUCATION/TRAINING PROGRAM

## 2023-02-28 PROCEDURE — 97530 THERAPEUTIC ACTIVITIES: CPT

## 2023-02-28 RX ORDER — INSULIN LISPRO 100 [IU]/ML
0-16 INJECTION, SOLUTION INTRAVENOUS; SUBCUTANEOUS
Status: DISCONTINUED | OUTPATIENT
Start: 2023-03-01 | End: 2023-03-01

## 2023-02-28 RX ORDER — DEXTROMETHORPHAN POLISTIREX 30 MG/5ML
60 SUSPENSION ORAL EVERY 12 HOURS SCHEDULED
Status: DISCONTINUED | OUTPATIENT
Start: 2023-02-28 | End: 2023-03-08 | Stop reason: HOSPADM

## 2023-02-28 RX ORDER — METHYLPREDNISOLONE SODIUM SUCCINATE 40 MG/ML
40 INJECTION, POWDER, LYOPHILIZED, FOR SOLUTION INTRAMUSCULAR; INTRAVENOUS EVERY 12 HOURS
Status: DISCONTINUED | OUTPATIENT
Start: 2023-02-28 | End: 2023-03-02

## 2023-02-28 RX ORDER — INSULIN LISPRO 100 [IU]/ML
0-4 INJECTION, SOLUTION INTRAVENOUS; SUBCUTANEOUS NIGHTLY
Status: DISCONTINUED | OUTPATIENT
Start: 2023-02-28 | End: 2023-03-01

## 2023-02-28 RX ORDER — METHYLPREDNISOLONE SODIUM SUCCINATE 40 MG/ML
40 INJECTION, POWDER, LYOPHILIZED, FOR SOLUTION INTRAMUSCULAR; INTRAVENOUS EVERY 6 HOURS
Status: DISCONTINUED | OUTPATIENT
Start: 2023-02-28 | End: 2023-02-28

## 2023-02-28 RX ORDER — DEXTROMETHORPHAN POLISTIREX 30 MG/5ML
30 SUSPENSION ORAL EVERY 12 HOURS SCHEDULED
Status: DISCONTINUED | OUTPATIENT
Start: 2023-02-28 | End: 2023-02-28

## 2023-02-28 RX ORDER — BUMETANIDE 1 MG/1
3 TABLET ORAL 2 TIMES DAILY
Status: DISCONTINUED | OUTPATIENT
Start: 2023-03-01 | End: 2023-03-08 | Stop reason: HOSPADM

## 2023-02-28 RX ORDER — INSULIN GLARGINE 100 [IU]/ML
50 INJECTION, SOLUTION SUBCUTANEOUS 2 TIMES DAILY
Status: DISCONTINUED | OUTPATIENT
Start: 2023-03-01 | End: 2023-03-01

## 2023-02-28 RX ORDER — GUAIFENESIN 600 MG/1
600 TABLET, EXTENDED RELEASE ORAL 2 TIMES DAILY
Status: DISCONTINUED | OUTPATIENT
Start: 2023-02-28 | End: 2023-03-08 | Stop reason: HOSPADM

## 2023-02-28 RX ORDER — METHYLPREDNISOLONE SODIUM SUCCINATE 40 MG/ML
40 INJECTION, POWDER, LYOPHILIZED, FOR SOLUTION INTRAMUSCULAR; INTRAVENOUS EVERY 12 HOURS
Status: DISCONTINUED | OUTPATIENT
Start: 2023-03-01 | End: 2023-02-28

## 2023-02-28 RX ORDER — BENZONATATE 100 MG/1
100 CAPSULE ORAL 3 TIMES DAILY PRN
Status: DISCONTINUED | OUTPATIENT
Start: 2023-02-28 | End: 2023-03-08 | Stop reason: HOSPADM

## 2023-02-28 RX ADMIN — INSULIN GLARGINE 45 UNITS: 100 INJECTION, SOLUTION SUBCUTANEOUS at 21:19

## 2023-02-28 RX ADMIN — IPRATROPIUM BROMIDE AND ALBUTEROL SULFATE 1 AMPULE: 2.5; .5 SOLUTION RESPIRATORY (INHALATION) at 07:38

## 2023-02-28 RX ADMIN — GABAPENTIN 300 MG: 300 CAPSULE ORAL at 09:06

## 2023-02-28 RX ADMIN — CARVEDILOL 6.25 MG: 6.25 TABLET, FILM COATED ORAL at 21:32

## 2023-02-28 RX ADMIN — TICAGRELOR 90 MG: 90 TABLET ORAL at 21:19

## 2023-02-28 RX ADMIN — AMLODIPINE BESYLATE 5 MG: 5 TABLET ORAL at 09:08

## 2023-02-28 RX ADMIN — HEPARIN SODIUM 5000 UNITS: 5000 INJECTION INTRAVENOUS; SUBCUTANEOUS at 21:18

## 2023-02-28 RX ADMIN — IPRATROPIUM BROMIDE AND ALBUTEROL SULFATE 1 AMPULE: 2.5; .5 SOLUTION RESPIRATORY (INHALATION) at 19:33

## 2023-02-28 RX ADMIN — INSULIN LISPRO 2 UNITS: 100 INJECTION, SOLUTION INTRAVENOUS; SUBCUTANEOUS at 11:39

## 2023-02-28 RX ADMIN — FUROSEMIDE 40 MG: 10 INJECTION, SOLUTION INTRAMUSCULAR; INTRAVENOUS at 16:58

## 2023-02-28 RX ADMIN — ASPIRIN 81 MG: 81 TABLET, CHEWABLE ORAL at 09:06

## 2023-02-28 RX ADMIN — ALBUTEROL SULFATE 2.5 MG: 2.5 SOLUTION RESPIRATORY (INHALATION) at 00:55

## 2023-02-28 RX ADMIN — GUAIFENESIN 600 MG: 600 TABLET, EXTENDED RELEASE ORAL at 15:24

## 2023-02-28 RX ADMIN — HEPARIN SODIUM 5000 UNITS: 5000 INJECTION INTRAVENOUS; SUBCUTANEOUS at 15:24

## 2023-02-28 RX ADMIN — TICAGRELOR 90 MG: 90 TABLET ORAL at 09:06

## 2023-02-28 RX ADMIN — FUROSEMIDE 40 MG: 10 INJECTION, SOLUTION INTRAMUSCULAR; INTRAVENOUS at 09:08

## 2023-02-28 RX ADMIN — TAMSULOSIN HYDROCHLORIDE 0.4 MG: 0.4 CAPSULE ORAL at 09:06

## 2023-02-28 RX ADMIN — TRAZODONE HYDROCHLORIDE 25 MG: 50 TABLET ORAL at 21:30

## 2023-02-28 RX ADMIN — ONDANSETRON 4 MG: 4 TABLET, ORALLY DISINTEGRATING ORAL at 16:26

## 2023-02-28 RX ADMIN — IPRATROPIUM BROMIDE AND ALBUTEROL SULFATE 1 AMPULE: 2.5; .5 SOLUTION RESPIRATORY (INHALATION) at 14:43

## 2023-02-28 RX ADMIN — ATORVASTATIN CALCIUM 80 MG: 80 TABLET, FILM COATED ORAL at 21:19

## 2023-02-28 RX ADMIN — HEPARIN SODIUM 5000 UNITS: 5000 INJECTION INTRAVENOUS; SUBCUTANEOUS at 05:37

## 2023-02-28 RX ADMIN — GUAIFENESIN 600 MG: 600 TABLET, EXTENDED RELEASE ORAL at 21:19

## 2023-02-28 RX ADMIN — INSULIN LISPRO 6 UNITS: 100 INJECTION, SOLUTION INTRAVENOUS; SUBCUTANEOUS at 16:57

## 2023-02-28 RX ADMIN — SODIUM CHLORIDE, PRESERVATIVE FREE 10 ML: 5 INJECTION INTRAVENOUS at 09:08

## 2023-02-28 RX ADMIN — ALLOPURINOL 100 MG: 100 TABLET ORAL at 09:06

## 2023-02-28 RX ADMIN — SODIUM CHLORIDE, PRESERVATIVE FREE 10 ML: 5 INJECTION INTRAVENOUS at 19:54

## 2023-02-28 RX ADMIN — Medication 60 MG: at 21:19

## 2023-02-28 RX ADMIN — METHYLPREDNISOLONE SODIUM SUCCINATE 40 MG: 40 INJECTION, POWDER, FOR SOLUTION INTRAMUSCULAR; INTRAVENOUS at 17:57

## 2023-02-28 RX ADMIN — PANTOPRAZOLE SODIUM 40 MG: 40 TABLET, DELAYED RELEASE ORAL at 05:37

## 2023-02-28 RX ADMIN — IPRATROPIUM BROMIDE AND ALBUTEROL SULFATE 1 AMPULE: 2.5; .5 SOLUTION RESPIRATORY (INHALATION) at 10:54

## 2023-02-28 RX ADMIN — CARVEDILOL 6.25 MG: 6.25 TABLET, FILM COATED ORAL at 09:06

## 2023-02-28 ASSESSMENT — ENCOUNTER SYMPTOMS
COUGH: 1
SHORTNESS OF BREATH: 1
VOMITING: 0
ABDOMINAL PAIN: 0
NAUSEA: 0

## 2023-02-28 NOTE — PROGRESS NOTES
Physician Progress Note      PATIENT:               Bhargavi Ruiz  CSN #:                  [id number]  :                       1960  ADMIT DATE:       2023 9:54 AM  DISCH DATE:  Laurie Service  PROVIDER #:        Trell Mckeon MD          QUERY TEXT:    Pt admitted with CHF exacerbation. If possible, please document in progress   notes and discharge summary further specificity regarding the type and acuity   of CHF:    The medical record reflects the following:  Risk Factors: Hx CHF  Clinical Indicators: ECHO from 23--> Estimated LVEF 50-55%; BNP 1457;   CXR-->Unchanged mild vascular congestion and tiny pleural effusions. Increased   interstitial opacities at the lung bases, possibly interstitial edema versus   developing pneumonia; per ED notes--> Patient does have lower extremity   swelling and her shortness of breath gets worse when laying flat;  Treatment: IV Lasix BID; BNP, cardio consult, JESUS MANUEL diet with 1500ml Fluid   restriction; monitoring, hospital admission,  Options provided:  -- Acute on Chronic Diastolic CHF/HFpEF  -- Other - I will add my own diagnosis  -- Disagree - Not applicable / Not valid  -- Disagree - Clinically unable to determine / Unknown  -- Refer to Clinical Documentation Reviewer    PROVIDER RESPONSE TEXT:    This patient is in acute on chronic diastolic CHF/HFpEF. Query created by: Rakan Mas on 2023 11:01 AM      Electronically signed by:   Trell Mckeon MD 2023 1:45 PM

## 2023-02-28 NOTE — PROGRESS NOTES
Pt desatted to low 80's on 5L NC with dangling on bedside with physical therapy. Very sob, unable to speak in full sentence. Deep slow breaths encouraged. 87% on 6 L and repositioned back in bed. RT Earnestine Stager called for prn albuterol tx. Primary rn alaina updated.

## 2023-02-28 NOTE — FLOWSHEET NOTE
02/28/23 0110   Treatment Team Notification   Reason for Communication Change in status   Team Member Name Dr. Marlen Jesus Provider   Method of Communication Secure Message   Response Waiting for response   Notification Time 0110     RN notify Dr. Ramirez Caller of new onset bilateral wheezing.  No increase in work of breathing noted, SpO2 91-93% on 6L NC.

## 2023-02-28 NOTE — CARE COORDINATION
Case Management Assessment  Initial Evaluation    Date/Time of Evaluation: 2/28/2023 12:22 PM  Assessment Completed by: Lauren Juan RN    If patient is discharged prior to next notation, then this note serves as note for discharge by case management. Patient Name: Jessica Marin                   YOB: 1960  Diagnosis: Hypoxia [R09.02]  Acute on chronic heart failure, unspecified heart failure type ([facility] [I50.9]  Congestive heart failure, unspecified HF chronicity, unspecified heart failure type [facility]) [I50.9]                   Date / Time: 2/27/2023  9:54 AM    Patient Admission Status: Inpatient   Readmission Risk (Low < 19, Mod (19-27), High > 27): Readmission Risk Score: 30.5    Current PCP: AFSANEH Parra CNP  PCP verified by CM? Yes    Chart Reviewed: Yes      History Provided by: Patient  Patient Orientation: Alert and Oriented    Patient Cognition: Alert    Hospitalization in the last 30 days (Readmission):  Yes    If yes, Readmission Assessment in CM Navigator will be completed. Advance Directives:      Code Status: Full Code   Patient's Primary Decision Maker is:        Discharge Planning:    Patient lives with: Other (Comment) Type of Home: [place]  Primary Care Giver: Self  Patient Support Systems include: Family Members   Current Financial resources: Medicaid, Medicare  Current community resources: None  Current services prior to admission: Oxygen Therapy, Durable Medical Equipment (Pt. wears 3/4 LNC, Oxygen, 24/7 already at home, uses [facility].)            Current DME: Areta Nay, Wheelchair            Type of Home Care services:  None    ADLS  Prior functional level:  (From SNF)  Current functional level:  (From SNF)    PT AM-PAC:   /24  OT AM-PAC:   /24    Family can provide assistance at DC: Yes  Would you like Case Management to discuss the discharge plan with any other family members/significant others, and if so, who?  No  Plans to Return to Present Housing: Yes (Return to [facility], will need New Pre-Cert.)  Other Identified Issues/Barriers to RETURNING to current housing: None  Potential Assistance needed at discharge: N/A            Potential DME:  (Pt. wears 3-4LNC, 24/7 at home, uses [employer] [place])  Patient expects to discharge to: [address] for transportation at discharge: [address]    Payor: Annabelle Greer / Plan: [employer] Leaf / Product Type: *No Product type* /     Does insurance require precert for SNF: Yes    Potential assistance Purchasing Medications: No  Meds-to-Beds request: No      [address] [facility], [address]  [facility] [address]  Phone: [phone] Fax: [phone]    [facility] [address] SARAH DELGADO [address] [place], [place] - [address] [place] [address]  [phone] [facility] [phone]  [place] [address]  [address] [place] [address]  Phone: [phone] Fax: [phone]      Notes:    Factors facilitating achievement of predicted outcomes: Family support, Cooperative, Pleasant, and Has needed Durable Medical Equipment at home    Barriers to discharge: None    Additional Case Management Notes: 2/28/23 , Sue Scott Dual Pt. Came from [facility], They are holding her Bed, Will need New Pre-Cert to return. PT/OT, IV Lasix, BID, Pulm/Cardio, Pro BNP 1457. Christiano will need signed/completed//KB    The Plan for Transition of Care is related to the following treatment goals of Hypoxia [R09.02]  Acute on chronic heart failure, unspecified heart failure type ([address] [I50.9]  Congestive heart failure, unspecified HF chronicity, unspecified heart failure type ([address] [D87.5]    IF APPLICABLE: The Patient and/or patient representative Nilam Landeros and her family were provided with a choice of provider and agrees with the discharge plan.  Freedom of choice list with basic dialogue that supports the patient's individualized plan of care/goals and shares the quality data associated with the providers was provided to: Patient   Patient Representative Name:       The Patient and/or Patient Representative Agree with the Discharge Plan?  Yes    Jessika Marquez RN  Case Management Department  Ph: [phone] Fax: [phone]

## 2023-02-28 NOTE — PROGRESS NOTES
[address]   Occupational Therapy Evaluation  Date: 23  Patient Name: Yvette Spence       Room: 5809/9498-60  MRN: [id number]  Account: [de-identified]   : 1960  (60 y.o.) Gender: female     Discharge Recommendations:  Further Occupational Therapy is recommended upon facility discharge. OT Equipment Recommendations  Other: TBD    Referring Practitioner: Vicente Riddle MD  Diagnosis: Hypoxia      Treatment Diagnosis: Impaired self care status    Past Medical History:  has a past medical history of Arthritis, Backache, unspecified, Cerebral artery occlusion with cerebral infarction [facility]), CHF (congestive heart failure) ([facility] [address] Chronic kidney disease, Coronary atherosclerosis of artery bypass graft, COVID, Cramp of limb, Gallstones, Hemodialysis patient ([facility] [address] Hyperlipidemia, Hypertension, Insomnia, Neuromuscular disorder ([facility] [address] Pneumonia, Psychiatric problem, Thyroid disease, Type II or unspecified type diabetes mellitus with renal manifestations, not stated as uncontrolled(250.40), Type II or unspecified type diabetes mellitus without mention of complication, not stated as uncontrolled, and Unspecified vitamin D deficiency. Past Surgical History:   has a past surgical history that includes Coronary artery bypass graft; Knee arthroscopy; Carpal tunnel release; Breast surgery; Tonsillectomy; Hand surgery; Ankle fracture surgery; Cholecystectomy, open (N/A); IR TUNNELED CVC PLACE WO SQ PORT/PUMP > 5 YEARS (2021); AV fistula creation (2021); Dialysis fistula creation (Left, 2021); other surgical history (2022); back surgery; Colonoscopy; eye surgery; fracture surgery; Cardiac surgery; and Cardiac catheterization (2023).     Restrictions  Restrictions/Precautions  Restrictions/Precautions: Fall Risk;General Precautions  Required Braces or Orthoses?: Yes (Lymphedema)      Vitals  Vitals  Heart Rate: 68  Heart Rate Source: Monitor  BP: 138/66  BP Location: Right upper arm  BP Method: Automatic  Patient Position: Semi fowlers  MAP (Calculated): 90  Resp: 20  SpO2: 90 %  O2 Device: Nasal cannula     Subjective  Subjective:  \"Thanks T\" Pt was pleasant and agreeable to OT eval  Comments: Ok per [employer] for OT eval  Subjective  Pain: Pt denies pain      Social/Functional History  Social/Functional History  Lives With: Parent (Mother-caretaker for mom)  Type of Home: Condo  Home Layout: One level  Home Access: Ramped entrance  Bathroom Shower/Tub: Walk-in shower, Shower chair with back, Curtain  Bathroom Toilet: Handicap height  Bathroom Equipment: Grab bars in shower, Shower chair, Hand-held shower  Home Equipment: Cane, Rollator, Walker, rolling, Oxygen, Wheelchair-manual (O2 3-4L)  ADL Assistance: Independent  Homemaking Assistance: Needs assistance (Sisters assisting)  Homemaking Responsibilities: No  Ambulation Assistance: Independent (with use of rollator)  Transfer Assistance: Independent  Active : No  Patient's  Info: sisters assist with transportation, med cab to and from dialysis  Mode of Transportation:  (Pt. can return back to [facility], in [place])  Occupation: On disability  IADL Comments: Pt sleeps in adjustable bed  Additional Comments: Pt recently at gardens      Objective  Orientation  Overall Orientation Status: Within Functional Limits           ADL  Feeding: Setup  Grooming: Setup  UE Bathing: Contact guard assistance  LE Bathing: Maximum assistance  UE Dressing: Contact guard assistance  LE Dressing: Maximum assistance  Toileting: Dependent/Total  Toileting Skilled Clinical Factors: External catheter/brief  Additional Comments: ADL scores based on clinical reasoning and skilled observation unless otherwise noted         UE Function  LUE AROM (degrees)  LUE AROM : WFL  Left Hand AROM (degrees)  Left Hand AROM: WFL  Tone LUE  LUE Tone: Normotonic  LUE Strength  Gross LUE Strength: WFL  L Hand General: 4-/5    RUE AROM (degrees)  RUE AROM : Exceptions  RUE General AROM: ~60 degrees shoulder flexion; WFL otherwise  Right Hand AROM (degrees)  Right Hand AROM: Exceptions  Right Hand General AROM: unable to fully close due to swelling  Tone RUE  RUE Tone: Normotonic  RUE Strength  Gross RUE Strength: Exceptions to [facility]  R Hand General: 2+/5         Fine Motor Skills/Coordination  Coordination  Movements Are Fluid And Coordinated: No  Coordination and Movement Description: Fine motor impairments, Gross motor impairments, Decreased speed, Decreased accuracy, Right UE              Bed Mobility  Bed mobility  Supine to Sit: Moderate assistance  Sit to Supine: Moderate assistance  Scooting: Minimal assistance  Bed Mobility Comments: Bed mobility completed with HOB elevated with increased time and rest breaks due to labored breathing. Verbal cues for pursed lip breathing with Good carryover. Balance  Balance  Sitting Balance: Contact guard assistance  Standing Balance: Unable to assess(comment)       Transfers  Transfers  Transfer Comments: PINKY         Assessment  Assessment  Performance deficits / Impairments: Decreased ADL status, Decreased functional mobility , Decreased ROM, Decreased strength, Decreased safe awareness, Decreased endurance, Decreased balance, Decreased high-level IADLs, Decreased fine motor control, Decreased coordination  Treatment Diagnosis: Impaired self care status  Prognosis: Fair  Decision Making: Medium Complexity  Discharge Recommendations: Patient would benefit from continued therapy after discharge    Activity Tolerance  Activity Tolerance: Patient Tolerated treatment well, Patient limited by fatigue    Safety Devices  Type of Devices:  All fall risk precautions in place, Call light within reach, Bed alarm in place, Gait belt, Patient at risk for falls, Left in bed, Nurse notified    Patient Education  Patient Education  Education Given To: Patient  Education Provided: Role of Therapy, Plan of Care, Energy Conservation  Education Method: Verbal  Barriers to Learning: None  Education Outcome: Verbalized understanding, Continued education needed      Functional Outcome Measures  AM-PAC Daily Activity - Inpatient   How much help is needed for putting on and taking off regular lower body clothing?: A Lot  How much help is needed for bathing (which includes washing, rinsing, drying)?: A Lot  How much help is needed for toileting (which includes using toilet, bedpan, or urinal)?: Total  How much help is needed for putting on and taking off regular upper body clothing?: A Little  How much help is needed for taking care of personal grooming?: A Little  How much help for eating meals?: A Little  AM-[facility] Inpatient Daily Activity Raw Score: 14  AM-PAC Inpatient ADL T-Scale Score : 33.39  ADL Inpatient CMS 0-100% Score: 59.67  ADL Inpatient CMS G-Code Modifier : CK       Goals     Short Term Goals  Time Frame for Short Term Goals: By discharge  Short Term Goal 1: Pt will complete bed mobility to sitting EOB with Mod I to sitting EOB for 10+ minutes unsupported during self care tasks while maintaining SpO2 above 90%  Short Term Goal 2: Pt will complete lower body bathing/dressing with min A and Good safety with use of AE as needed  Short Term Goal 3: Pt will progress to functional transfers/mobility during self care tasks with SBA and Good safety with use of least restrictive device while maintaining Spo2 above 90%  Short Term Goal 4: Pt will verbalize/demonstrate Good understanding of energy conservation/fall prevention strategies to increase safety and independence with self care tasks  Short Term Goal 5: Pt will participate in 15+ minutes of therapeutic exercises/functional activities to increase safety and independence with self care and mobility    Plan  Occupational Therapy Plan  Times Per Week: 4-6  Current Treatment Recommendations: Self-Care / ADL, Strengthening, Balance training, Functional mobility training, Endurance training, Pain management, Safety education & training, Patient/Caregiver education & training, Equipment evaluation, education, & procurement, Home management training      OT Individual Minutes  OT Individual Minutes  Time In: 1106  Time Out: 3688  Minutes: 33  Time Code Minutes   Timed Code Treatment Minutes: 8 Minutes        Electronically signed by ZAYDA Cardona on 2/28/23 at 2:50 PM EST

## 2023-02-28 NOTE — H&P
[address]  Family Medicine      History & Physical              Date:   2/27/2023  Patient name:  Edwin Singh  Date of admission:  2/27/2023  9:54 AM  MRN:   [id number]  YOB: 1960    CHIEF COMPLAINT:       Chief Complaint   Patient presents with    Shortness of Breath       History Obtained From:  Patient and chart review. HPI:     The patient is a 57 y.o.  female who presented with worsening shortness of breath for the past 3 days. She also has had diarrhea. Pt complains of lower leg swelling though this is a chronic finding. Pt came from nursing facility. She was desatting to the 80s on home O2. Pt required 6L in the ER. CXR showed interstitial opacities at the bases and elevated pro-bnp. The patient was seen and examined at bedside. She feels better on 6L, denies fevers. she is admitted to the hospital for acute hypoxic respiratory failure & CHF exacerbation. 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 PAST SURGICAL HISTORY:      has a past surgical history that includes Coronary artery bypass graft; Knee arthroscopy; Carpal tunnel release; Breast surgery; Tonsillectomy; Hand surgery; Ankle fracture surgery; Cholecystectomy, open (N/A); IR TUNNELED CVC PLACE WO SQ PORT/PUMP > 5 YEARS (08/18/2021);  AV fistula creation (12/14/2021); Dialysis fistula creation (Left, 12/14/2021); other surgical history (04/06/2022); back surgery; Colonoscopy; eye surgery; fracture surgery; Cardiac surgery; and Cardiac catheterization (02/14/2023). FAMILY HISTORY:     family history includes Diabetes in her father; Heart Failure in her father. HOME MEDICATIONS:     Prior to Admission medications    Medication Sig Start Date End Date Taking? Authorizing Provider   busPIRone (BUSPAR) 7.5 MG tablet Take 7.5 mg by mouth 3 times daily   Yes Historical Provider, MD   ipratropium-albuterol (DUONEB) 0.5-2.5 (3) MG/3ML SOLN nebulizer solution Inhale 1 vial into the lungs every 4 hours   Yes Historical Provider, MD   linaclotide (LINZESS) 145 MCG capsule Take 145 mcg by mouth every morning (before breakfast)   Yes Historical Provider, MD   omeprazole (PRILOSEC) 20 MG delayed release capsule Take 20 mg by mouth daily   Yes Historical Provider, MD   dextromethorphan-guaiFENesin (ROBITUSSIN COLD COUGH+ CHEST)  MG/5ML syrup Take 10 mLs by mouth every 8 hours as needed for Cough   Yes Historical Provider, MD   carvedilol (COREG) 6.25 MG tablet Take 1 tablet by mouth 2 times daily 2/17/23   Soumya Horn MD   amLODIPine (NORVASC) 5 MG tablet Take 1 tablet by mouth daily 2/18/23   Soumya Horn MD   gabapentin (NEURONTIN) 100 MG capsule Take 1 capsule by mouth daily for 30 days.  Intended supply: 30 days 2/17/23 3/19/23  AFSANEH Verde - CNP   ticagrelor (BRILINTA) 90 MG TABS tablet Take 1 tablet by mouth 2 times daily 2/14/23 5/15/23  Danuta Whitten MD   atorvastatin (LIPITOR) 80 MG tablet Take 1 tablet by mouth nightly 2/14/23 5/15/23  Danuta Whitten MD   melatonin 3 MG TABS tablet Take 10 mg by mouth nightly as needed (for sleep) Indications: Crow Henderson Provider, MD   bumetanide (BUMEX) 1 MG tablet Take 3 tablets by mouth 2 times daily F/u with nephro for dose adjustment 2/6/23 3/8/23  Saba Carver MD Continuous Blood Gluc Sensor (DEXCOM G6 SENSOR) MISC 1 each by Does not apply route 4 times daily 1/19/23   Anna Service, AFSANEH - CNP   Continuous Blood Gluc  ([address]) KODY 1 each by Does not apply route 4 times daily 1/19/23   Anna Service, AFSANEH - CNP   sodium bicarbonate 650 MG tablet Take 2 tablets by mouth 2 times daily 1/16/23 1/16/24  Shannon Cueto MD   insulin glargine [facility]) 100 UNIT/ML injection pen Inject 55 Units into the skin 2 times daily 12/13/22 2/27/23  Anna Service, APRN - CNP   allopurinol (ZYLOPRIM) 100 MG tablet Take 1 tablet by mouth daily 12/8/22   Shannon Cueto MD   tamsulosin [facility]) 0.4 MG capsule Take 1 capsule by mouth daily 9/27/22   Anna Service, APRN - CNP   Insulin Pen Needle (EASY TOUCH PEN NEEDLES) 29G X 12MM MISC 5 each by Does not apply route daily 9/22/22 1/18/23  Anna Service, AFSANEH - CNP   Blood Glucose Monitoring Suppl (TRUE METRIX GO GLUCOSE METER) w/Device KIT 1 each by Does not apply route 4 times daily 9/19/22   Anna Service, AFSANEH - CNP   blood glucose monitor strips Test 3 times a day & as needed for symptoms of irregular blood glucose. Dispense sufficient amount for indicated testing frequency plus additional to accommodate PRN testing needs. 9/19/22   Anna Service, AFSANEH Cesar CNP   AZO-CRANBERRY PO Take 2 capsules by mouth daily    Historical Provider, MD   Calcium Polycarbophil (FIBER-CAPS PO) Take 2 capsules by mouth in the morning and at bedtime    Historical Provider, MD   gabapentin (NEURONTIN) 300 MG capsule Take 300 mg by mouth in the morning.     Historical Provider, MD   acetaminophen (TYLENOL) 325 MG tablet Take 650 mg by mouth every 6 hours as needed for Pain    Historical Provider, MD   ReliOn Lancets Micro-Thin 33G MISC USE AS DIRECTED EVERY DAY 1/28/22   Historical Provider, MD   HUMALOG 100 UNIT/ML injection vial Inject 15 Units into the skin 3 times daily (before meals) 11/29/21   Historical Provider, MD   Lancets MISC 1 each by Does not apply route daily 11/30/21   Reford Second, APRN - CNP   docusate sodium (COLACE) 100 MG capsule Take 1 capsule by mouth 2 times daily 11/22/21   Jonathan Allen MD   aspirin 81 MG chewable tablet Take 1 tablet by mouth daily 9/25/21   Fito Araiza MD   allopurinol (ZYLOPRIM) 100 MG tablet Take 1 tablet by mouth daily 4/14/21   Reford Second, APRN - CNP       ALLERGIES:      Adhesive tape, Isosorbide dinitrate, Ranexa [ranolazine], Metformin and related, Ace inhibitors, Iv dye [iodides], Nsaids, and Metformin and related    SOCIAL HISTORY:      reports that she has never smoked. She has never used smokeless tobacco. She reports that she does not drink alcohol and does not use drugs. REVIEW OF SYSTEMS:     Review of Systems   Constitutional:  Negative for fever. Respiratory:  Positive for shortness of breath and wheezing. Cardiovascular:  Positive for leg swelling. Negative for palpitations. Gastrointestinal:  Positive for diarrhea. Negative for abdominal pain and nausea. Genitourinary:  Negative for difficulty urinating. Neurological: Negative. PHYSICAL EXAM:     Vitals:    02/27/23 1815 02/27/23 1915 02/27/23 1930 02/27/23 2033   BP: 133/70 137/81 137/81    Pulse: 66 66  61   Resp: 20 17  20   Temp: 97.7 °F (36.5 °C)      TempSrc: Oral      SpO2: 97%   92%   Weight:       Height:             Intake/Output Summary (Last 24 hours) at 2/27/2023 2101  Last data filed at 2/27/2023 2035  Gross per 24 hour   Intake 1160 ml   Output --   Net 1160 ml       Physical Exam  Vitals reviewed. Constitutional:       Appearance: Normal appearance. Eyes:      Extraocular Movements: Extraocular movements intact. Conjunctiva/sclera: Conjunctivae normal.   Cardiovascular:      Rate and Rhythm: Normal rate and regular rhythm. Pulses: Normal pulses. Heart sounds: Normal heart sounds. Pulmonary:      Breath sounds: Wheezing and rales present.    Abdominal:      General: There is no distension. Palpations: Abdomen is soft. Tenderness: There is no abdominal tenderness. Musculoskeletal:      Right lower leg: Edema present. Left lower leg: Edema present. Neurological:      General: No focal deficit present. Mental Status: She is alert and oriented to person, place, and time. DIAGNOSTICS:      Laboratory Testing:    Recent Results (from the past 24 hour(s))   CBC with Auto Differential    Collection Time: 02/27/23 10:27 AM   Result Value Ref Range    WBC 8.5 3.5 - 11.0 k/uL    RBC 3.66 (L) 4.0 - 5.2 m/uL    Hemoglobin 11.7 (L) 12.0 - 16.0 g/dL    Hematocrit 35.5 (L) 36 - 46 %    MCV 97.2 80 - 100 fL    MCH 32.1 26 - 34 pg    MCHC 33.0 31 - 37 g/dL    RDW 15.1 (H) 11.5 - 14.9 %    Platelets 766 854 - 166 k/uL    MPV 8.5 6.0 - 12.0 fL    Seg Neutrophils 78 (H) 36 - 66 %    Lymphocytes 12 (L) 24 - 44 %    Monocytes 5 1 - 7 %    Eosinophils % 4 0 - 4 %    Basophils 1 0 - 2 %    Segs Absolute 6.70 1.3 - 9.1 k/uL    Absolute Lymph # 1.00 1.0 - 4.8 k/uL    Absolute Mono # 0.50 0.1 - 1.3 k/uL    Absolute Eos # 0.30 0.0 - 0.4 k/uL    Basophils Absolute 0.10 0.0 - 0.2 k/uL   BMP    Collection Time: 02/27/23 10:27 AM   Result Value Ref Range    Glucose 128 (H) 70 - 99 mg/dL    BUN 50 (H) 8 - 23 mg/dL    Creatinine 2.39 (H) 0.50 - 0.90 mg/dL    Est, Glom Filt Rate 22 (L) >60 mL/min/1.73m2    Calcium 9.1 8.6 - 10.4 mg/dL    Sodium 142 135 - 144 mmol/L    Potassium 4.3 3.7 - 5.3 mmol/L    Chloride 105 98 - 107 mmol/L    CO2 25 20 - 31 mmol/L    Anion Gap 12 9 - 17 mmol/L   Brain Natriuretic Peptide    Collection Time: 02/27/23 10:27 AM   Result Value Ref Range    Pro-BNP 1,457 (H) <300 pg/mL   Troponin    Collection Time: 02/27/23 10:27 AM   Result Value Ref Range    Troponin, High Sensitivity 91 (HH) 0 - 14 ng/L   COVID-19 & Influenza Combo    Collection Time: 02/27/23 10:28 AM    Specimen: Nasopharyngeal Swab   Result Value Ref Range    Specimen Description . NASOPHARYNGEAL SWAB     Source . NASOPHARYNGEAL SWAB     SARS-CoV-2 RNA, RT PCR Not Detected Not Detected    INFLUENZA A Not Detected Not Detected    INFLUENZA B Not Detected Not Detected   EKG 12 Lead    Collection Time: 02/27/23 11:26 AM   Result Value Ref Range    Ventricular Rate 60 BPM    Atrial Rate 60 BPM    P-R Interval 160 ms    QRS Duration 98 ms    Q-T Interval 398 ms    QTc Calculation (Bazett) 398 ms    P Axis 42 degrees    R Axis 10 degrees    T Axis -54 degrees   Troponin    Collection Time: 02/27/23 12:53 PM   Result Value Ref Range    Troponin, High Sensitivity 84 (HH) 0 - 14 ng/L   Urinalysis with Reflex to Culture    Collection Time: 02/27/23  3:47 PM   Result Value Ref Range    Color, UA Yellow Yellow    Turbidity UA Clear Clear    Glucose, Ur NEGATIVE NEGATIVE    Bilirubin Urine NEGATIVE NEGATIVE    Ketones, Urine NEGATIVE NEGATIVE    Specific [place], UA 1.011 1.000 - 1.030    Urine Hgb NEGATIVE NEGATIVE    pH, UA 6.5 5.0 - 8.0    Protein, UA 2+ (A) NEGATIVE    Urobilinogen, Urine Normal Normal    Nitrite, Urine NEGATIVE NEGATIVE    Leukocyte Esterase, Urine NEGATIVE NEGATIVE   Microscopic Urinalysis    Collection Time: 02/27/23  3:47 PM   Result Value Ref Range    WBC, UA 0 TO 2 /HPF    RBC, UA 0 TO 2 /HPF    Casts UA 3 to 5 /LPF    Epithelial Cells UA 3 to 5 /HPF    Bacteria, UA None None   POC Glucose Fingerstick    Collection Time: 02/27/23  4:03 PM   Result Value Ref Range    POC Glucose 105 65 - 105 mg/dL   BLOOD GAS, VENOUS    Collection Time: 02/27/23  6:55 PM   Result Value Ref Range    pH, Felix 7.449 (H) 7.320 - 7.420    pCO2, Felix 42.6 39.0 - 55.0 mm Hg    pO2, Felix 47.1 30.0 - 50.0 mm Hg    HCO3, Venous 29.5 24.0 - 30.0 mmol/L    Positive Base Excess, Felix 5.5 (H) 0.0 - 2.0 mmol/L    O2 Sat, Felix 81.8 60.0 - 85.0 %    Carboxyhemoglobin 2.9 0 - 5 %    Methemoglobin 0.8 0.0 - 1.9 %    Text for Respiratory VENOUS BLOOD BY LAB    POC Glucose Fingerstick    Collection Time: 02/27/23  8:21 PM   Result Value Ref Range    POC Glucose 118 (H) 65 - 105 mg/dL         Imaging/[employer]:  XR CHEST (SINGLE VIEW FRONTAL)    Result Date: 2/15/2023  EXAMINATION: ONE XRAY VIEW OF THE CHEST 2/15/2023 8:42 am COMPARISON: 02/13/2023, 02/11/2023 HISTORY: ORDERING SYSTEM PROVIDED HISTORY: worsening SOB TECHNOLOGIST PROVIDED HISTORY: worsening SOB FINDINGS: The cardiac and mediastinal contours appear unchanged. Vascular congestion appears more prominent in the interval.  Basilar subsegmental atelectasis again noted. No focal area of consolidation or significant effusion appreciated. No evidence for pneumothorax. Vascular congestion appears progressed in the interval.     XR CHEST (SINGLE VIEW FRONTAL)    Result Date: 2/11/2023  EXAMINATION: ONE XRAY VIEW OF THE CHEST 2/11/2023 9:20 pm COMPARISON: 01/29/2023 HISTORY: ORDERING SYSTEM PROVIDED HISTORY: Syncopal episode TECHNOLOGIST PROVIDED HISTORY: Syncopal episode Reason for Exam: pt experienced loss of consciousness,fall,pain in left shoulder,right hip and pelvis,and right lower leg. FINDINGS: Heart size is mildly enlarged aorta is normal.  Lungs are normally expanded. Mild right infrahilar opacity but the frontal views apical lordotic. No definite consolidation. No pleural effusions. Mild spondylosis     No definite consolidation     XR TIBIA FIBULA RIGHT (2 VIEWS)    Result Date: 2/11/2023  EXAMINATION: 2 XRAY VIEWS OF THE RIGHT TIBIA AND FIBULA 2/11/2023 9:20 pm COMPARISON: None. HISTORY: ORDERING SYSTEM PROVIDED HISTORY: Fall TECHNOLOGIST PROVIDED HISTORY: Fall Reason for Exam: pt experienced loss of consciousness,fall,pain in left shoulder,right hip and pelvis,and right lower leg FINDINGS: Negative for fracture or dislocation. Marginal spurring medially. Dense meniscal calcifications laterally. No definite joint effusion. Osteophytes posterior patella. Phleboliths in the soft tissues. Vascular calcifications.      No visualized fracture     CT HEAD WO CONTRAST    Result Date: 2/11/2023  EXAMINATION: CT OF THE HEAD WITHOUT CONTRAST  2/11/2023 8:42 pm TECHNIQUE: CT of the head was performed without the administration of intravenous contrast. Automated exposure control, iterative reconstruction, and/or weight based adjustment of the mA/kV was utilized to reduce the radiation dose to as low as reasonably achievable. COMPARISON: June 25, 2022 brain MRI. HISTORY: ORDERING SYSTEM PROVIDED HISTORY: Fall; TECHNOLOGIST PROVIDED HISTORY: Fall; Decision Support Exception - unselect if not a suspected or confirmed emergency medical condition->Emergency Medical Condition (MA) Reason for Exam: fall, ha FINDINGS: BRAIN/VENTRICLES: There is no acute intracranial hemorrhage, mass effect or midline shift. No abnormal extra-axial fluid collection. The gray-white differentiation is maintained without evidence of an acute infarct. There is no evidence of hydrocephalus. ORBITS: The visualized portion of the orbits demonstrate no acute abnormality. SINUSES: The visualized paranasal sinuses and mastoid air cells demonstrate no acute abnormality. SOFT TISSUES/SKULL:  No acute abnormality of the visualized skull or soft tissues. No acute intracranial abnormality. XR SHOULDER LEFT (MIN 2 VIEWS)    Result Date: 2/11/2023  EXAMINATION: 3 XRAY VIEWS OF THE LEFT SHOULDER 2/11/2023 9:13 pm COMPARISON: 04/08/2021 HISTORY: ORDERING SYSTEM PROVIDED HISTORY: Fall TECHNOLOGIST PROVIDED HISTORY: Fall Reason for Exam: pt experienced loss of consciousness,fall,pain in left shoulder,right hip and pelvis,and right lower leg FINDINGS: Negative for fracture or dislocation. Acromioclavicular arthropathy. No widening of the acromioclavicular joint.      No fracture     XR CHEST PORTABLE    Result Date: 2/27/2023  EXAMINATION: ONE X-RAY VIEW OF THE CHEST 2/27/2023 10:23 am COMPARISON: 02/16/2023 HISTORY: ORDERING SYSTEM PROVIDED HISTORY: shortness of breath TECHNOLOGIST PROVIDED HISTORY: shortness of breath Reason for Exam: SOB X 3 days FINDINGS: Median sternotomy wires are noted. Cardiomegaly is unchanged. There are increased interstitial opacities at the lung bases. Mild vascular congestion is unchanged. Tiny pleural effusions are unchanged. No evidence of pneumothorax. 1.  Unchanged mild vascular congestion and tiny pleural effusions. 2.  Increased interstitial opacities at the lung bases, possibly interstitial edema versus developing pneumonia. XR CHEST PORTABLE    Result Date: 2/13/2023  EXAMINATION: ONE XRAY VIEW OF THE CHEST 2/13/2023 8:56 am COMPARISON: Chest radiograph dated 02/11/2023 HISTORY: ORDERING SYSTEM PROVIDED HISTORY: dyspnea TECHNOLOGIST PROVIDED HISTORY: dyspnea cough Reason for Exam: dyspnea; cough FINDINGS: The cardiomediastinal silhouette is stable with cardiomegaly. Median sternotomy wires and mediastinal surgical clips are again seen. The monitoring wires remain in place. There is vascular congestion, improved compared to prior exam.  There are small bilateral pleural effusions, greater on the right. Atelectatic changes are seen in the lung bases, greater on the left. No pneumothorax. No acute bony abnormalities. Surgical fusion hardware is seen in the lower cervical spine. Pulmonary vascular congestion, slightly improved. Small bilateral pole effusions, with adjacent lung subsegmental atelectasis. XR CHEST PORTABLE    Result Date: 1/29/2023  EXAMINATION: ONE XRAY VIEW OF THE CHEST 1/29/2023 11:19 am COMPARISON: 27 January 2023 HISTORY: ORDERING SYSTEM PROVIDED HISTORY: sob TECHNOLOGIST PROVIDED HISTORY: sob Reason for Exam: port upright FINDINGS: AP portable view of the chest time stamped at 1044 hours demonstrates overlying cardiac monitoring electrodes and prior median sternotomy. Compression plate and screws are noted at the base of the neck.   Stable cardiomegaly is noted well as vascular congestion and bilateral effusions with bibasilar opacities consistent with pulmonary edema. Effusion on the right appears larger. No extrapleural air. Cardiomegaly, vascular congestion and effusions with appearance favoring edema with worsening basilar opacities. No extrapleural air. CT CHEST HIGH RESOLUTION    Result Date: 2/16/2023  EXAMINATION: CT IMAGES OF THE CHEST WITHOUT CONTRAST, HIGH RESOLUTION 2/16/2023 TECHNIQUE: CT of the chest was performed without the administration of intravenous contrast. High resolution CT imaging was performed of the lungs. Multiplanar reformatted images are provided for review. Automated exposure control, iterative reconstruction, and/or weight based adjustment of the mA/kV was utilized to reduce the radiation dose to as low as reasonably achievable. High resolution CT images were performed in the supine inspiration, supine expiration, and prone inspiration positions. COMPARISON: None. HISTORY: ORDERING SYSTEM PROVIDED HISTORY: dry cough, restrictive pattern on PFTs check for pulmonary fibrosis TECHNOLOGIST PROVIDED HISTORY: dry cough, restrictive pattern on PFTs check for pulmonary fibrosis Reason for Exam: dry cough, restrictive pattern on PFTs check for pulmonary fibrosis Relevant Medical/Surgical History: CHF FINDINGS: Mediastinum: There are no enlarged lymph nodes. There is mild cardiomegaly. There is no pericardial effusion. HRCT Findings/Lungs/pleura: There are moderate right and small left pleural effusions with adjacent consolidation in the lower lobes. The pulmonary vessels are congested. There is mild septal thickening at the lung bases the interstitium appears otherwise normal.  No air trapping is noted. Upper Abdomen: Limited views the upper abdomen are unremarkable. Soft Tissues/Bones: There is no destructive bone lesion. 1. Congestive heart failure with mild interstitial edema at the lung bases.  Moderate right and small left pleural effusions are likely related with mild adjacent atelectasis in the lower lobes. 2. No evidence of interstitial lung disease. VL Lower Extremity Bilateral Venous Duplex    Result Date: 2/16/2023    [facility]  Vascular Lower Extremities DVT Study Procedure   Patient Name  Corine Rushing Date of Study           02/16/2023                TRAMAINE   Date of Birth 1960  Gender                  Female   Age           57 year(s)  Race                       Room Number   2043   Corporate ID  [id number]  #   Naveen Asif  [de-identified]  #   MR #          [id number]      [facility]   Accession #   [id number]  Interpreting Physician  Dion Pineda   Referring                 Referring Physician     Jun Elaine MD  Nurse  Practitioner  Procedure Type of Study:   Veins: Lower Extremities DVT Study, Venous Scan Lower Bilateral.  Indications for Study:R/O DVT. Patient Status: In Patient. Technical Quality:Limited visualization. Limitation reason:Groin bandage, recent heart cath. Conclusions   Summary   Simultaneous real time imaging utilizing B-Mode, color doppler and  spectral waveform analysis was performed on the bilateral lower  extremities for venous examination of the deep and superficial systems. Findings are:   Right: No evidence of superficial or deep venous thrombosis in the right  lower extremity. Left: No evidence of superficial or deep venous thrombosis in the left  lower extremity.    Signature   ----------------------------------------------------------------  Electronically signed by Renato Brewster RVT(Sonographer) on  02/16/2023 02:07 PM  ----------------------------------------------------------------   ----------------------------------------------------------------  Electronically signed by Tom ChoudharyInterpreting physician)  on 02/16/2023 08:14 PM  ----------------------------------------------------------------  Findings:   Right Impression:                  Left Impression:  Limited visualization of groin     The common femoral, femoral, popliteal  vessels due to recent heart cath. and tibial veins demonstrate normal                                     compressibility and augmentation. The femoral, popliteal and tibial  veins demonstrate normal           Normal compressibility of the great  compressibility and augmentation. saphenous vein. Normal compressibility of the      Normal compressibility of the small  great saphenous vein. saphenous vein. Normal compressibility of the  small saphenous vein. Allergies   - Allergy:Tape(Miscellaneous). - Allergy: Ace Inhibitors(Drug). - Allergy:Contrast(Miscellaneous). - Allergy:Metformin(Drug). - Allergy:NSAIDS(Drug). Comments:Adhesive tape, isosorbide ranexa, ace inhibitors, iv dye, nsaids, metformin Velocities are measured in cm/s ; Diameters are measured in cm Right Lower Extremities DVT Study Measurements Right 2D Measurements +------------------------------------+----------+---------------+----------+ ! Location                            ! Visualized! Compressibility! Thrombosis! +------------------------------------+----------+---------------+----------+ ! Common Femoral                      !No        !               !          ! +------------------------------------+----------+---------------+----------+ ! Prox Femoral                        !Partial   !Yes            ! None      ! +------------------------------------+----------+---------------+----------+ ! Mid Femoral                         !Yes       ! Yes            ! None      ! +------------------------------------+----------+---------------+----------+ ! Dist Femoral                        !Yes       ! Yes            ! None      ! +------------------------------------+----------+---------------+----------+ ! Deep Femoral                        !Yes       ! Yes            ! None      ! +------------------------------------+----------+---------------+----------+ ! Popliteal                           !Yes !Yes            !None      ! +------------------------------------+----------+---------------+----------+ ! Sapheno Femoral Junction            ! No        !               !          ! +------------------------------------+----------+---------------+----------+ ! PTV                                 ! Yes       ! Yes            ! None      ! +------------------------------------+----------+---------------+----------+ ! Peroneal                            !Yes       ! Yes            ! None      ! +------------------------------------+----------+---------------+----------+ ! Gastroc                             ! Yes       ! Yes            ! None      ! +------------------------------------+----------+---------------+----------+ ! GSV Thigh                           ! Yes       ! Yes            ! None      ! +------------------------------------+----------+---------------+----------+ ! GSV Knee                            ! Yes       ! Yes            ! None      ! +------------------------------------+----------+---------------+----------+ ! GSV Ankle                           ! Yes       ! Yes            ! None      ! +------------------------------------+----------+---------------+----------+ ! SSV                                 ! Yes       ! Yes            ! None      ! +------------------------------------+----------+---------------+----------+    NM MYOCARDIAL SPECT REST EXERCISE OR RX    Result Date: 2/13/2023  EXAMINATION: MYOCARDIAL PERFUSION IMAGING 2/13/2023 10:20 am TECHNIQUE: For the rest study, 36.7 mCi of Tc-99m labeled sestamibi were injected. SPECT images with ECG gating were acquired. Under cardiology supervision, 0.4 mg Lexiscan was infused. After pharmacologic stress, 16.7 mCi of Tc-99m labeled sestamibi were injected. SPECT images were acquired. COMPARISON: None Available.  HISTORY: ORDERING SYSTEM PROVIDED HISTORY: Syncope TECHNOLOGIST PROVIDED HISTORY: Reason for Exam: Syncope Procedure Type->Rx Reason for Exam: Syncope FINDINGS: Patient reportedly refused stress prone imaging. Large moderate fixed perfusion defect involving the anterolateral wall and into the adjoining cardiac apex. There is some reversibility in the anterolateral wall. Decreased myocardial thickening in the lateral wall although motion is fairly satisfactory. Findings compatible with lateral wall and apex infarct with anterolateral wall stalin-infarct ischemia. Perfusion scores are visually adjusted to account for artifact. Summed stress score:  14 Summed rest score:  8 Summed difference score: 5 Function: End diastolic volume:  811IA Left ventricular ejection fraction:  60% TID score:  1.02 (scores greater than 1.39 are considered elevated for Lexiscan stress with Tc99m) Notes concerning risk stratification: Risk stratification incorporates both clinical history and some testing results. Final risk determination is the responsibility of the ordering provider as other patient information and test results may increase or decrease the risk assessment reported for this examination. Risk stratification criteria are adapted from \"Noninvasive Risk Stratification\" criteria from [facility]. Al, ACC/AATS/AHA/ASE/ASNC/SCAI/SCCT/STS 2017 Appropriate Use Criteria For Coronary Revascularization in Patients With Stable Ischemic Heart Disease [facility] Volume 69, Issue 17, May 2017 High risk (>3% annual death or MI) 1. Severe resting LV dysfunction (LVEF <35%) not readily explained by non coronary causes 2. Resting perfusion abnormalities greater than 10% of the myocardium in patients without prior history or evidence of MI 3. Stress-induced perfusion abnormalities encumbering greater than or equal to 10% myocardium or stress segmental scores indicating multiple vascular territories with abnormalities 4. Stress-induced LV dilatation (TID ratio greater than 1.19 for exercise and greater than 1.39 for regadenoson) Intermediate risk (1% to 3% annual death or MI) 1.  Mild/moderate resting LV dysfunction (LVEF 35% to 49%) not readily explained by non coronary causes. 2. Resting perfusion abnormalities in 5%-9.9% of the myocardium in patients without a history or prior evidence of MI 3. Stress-induced perfusion abnormality encumbering 5%-9.9% of the myocardium or stress segmental scores indicating 1 vascular territory with abnormalities but without LV dilation 4. Small wall motion abnormality involving 1-2 segments and only 1 coronary bed. Low Risk (Less than 1% annual death or MI) 1. Normal or small myocardial perfusion defect at rest or with stress encumbering less than 5% of the myocardium. Large infarct in the lateral wall extending into the cardiac apex with some stalin-infarct ischemia in anterolateral wall. LVEF normal. Risk stratification: High risk patient has no prior history of evidence of MI. Low risk if there is known prior history. XR HIP 2-3 VW W PELVIS RIGHT    Result Date: 2/11/2023  EXAMINATION: ONE XRAY VIEW OF THE PELVIS AND TWO XRAY VIEWS RIGHT HIP 2/11/2023 9:19 pm COMPARISON: None. HISTORY: ORDERING SYSTEM PROVIDED HISTORY: Fall TECHNOLOGIST PROVIDED HISTORY: Fall Reason for Exam: pt experienced loss of consciousness,fall,pain in left shoulder,right hip and pelvis,and right lower leg FINDINGS: No fracture or dislocation. Dense vascular calcifications. No widening of the SI joints or the pubic symphysis. Previous lumbar fixation. No visualized fracture         ASSESSMENT/PLAN       Principal Problem:    Acute respiratory failure with hypoxia ([facility])  Active Problems:    Type 2 diabetes mellitus with hyperglycemia, with long-term current use of insulin ([facility])    Acute on chronic heart failure, unspecified heart failure type ([facility])    Diarrhea of presumed infectious origin    CKD (chronic kidney disease) stage 4, GFR 15-29 ml/min ([facility])  Resolved Problems:    * No resolved hospital problems. *      1. Lasix 40 IV BID  2. Continue amlodipine, coreg  3. Lantus 45 units BID, medium dose sliding scale,   4.C. Diff pending  5. Continue brilinta  6. Dupneb q4h while awake  7. Pulm, cardio consult  8. Creatinine is at baseline  9. Trop trending down      DVT prophylaxis: heparin (porcine) injection 5,000 TID  GI prophylaxis: Protonix 40 mg daily      Consultations:   Consults: IP CONSULT TO INTERNAL MEDICINE  IP CONSULT TO SOCIAL WORK  IP CONSULT TO PULMONOLOGY  IP CONSULT TO CARDIOLOGY  PT/OT       The severity of this patient's signs and symptoms (specify acute respiratory failure) indicate the need for an inpatient admission.       Above plan discussed with the patient        Cole Aguirre MD  2/27/2023 9:01 PM

## 2023-02-28 NOTE — CARDIO/PULMONARY
Breath Sounds: Very tight and wheezy and diminished  RR[de-identified] 20  Pulse Sat: 6 L/m N/C  Home Meds: 0    Bronchodilator assessment at level: 3  []    Home Level  BRONCHODILATOR ASSESSMENT SCORE  Score 0 1 2 3 4 5   Breath Sounds   []  Patient Baseline []  No Wheeze good aeration [x]  Faint, scattered wheezing, good aeration []  Expiratory Wheezing and or moderately diminished []  Insp/Exp wheeze and/or very diminished []  Insp/Exp and/ or marked distress   Respiratory Rate   []  Patient Baseline []  Less than 20 []  Less than 20 [x]  20-25 []  Greater than 25 []  Greater than 25   Peak flow % of Pred or PB [x]  NA   []  Greater than 90%  []  81-90% []  71-80% []  Less than or equal to 70%  or unable to perform []  Unable due to Respiratory Distress   Dyspnea re []  Patient Baseline []  No SOB []  No SOB [x]  SOB on exertion []  SOB min activity []  At rest/acute   e FEV% Predicted       [x]  NA []  Above 69%  []  Unable []  Above 60-69%  []  Unable []  Above 50-59%  []  Unable []  Above 35-49%  []  Unable []  Less than 35%  []  Unable

## 2023-02-28 NOTE — CONSULTS
Jason Bhatt MD/Carter Riley MD/ Dr. Roni Shane AGACNP-BC, NP-C      Clayton Huangrupa APRN NP-C          [id number] [facility] APRN - NP-C                                      Critical Care / Pulmonary Consult Note    Patient - Taylor Cr   Age - 57 y.o.   - 1960  MRN - [id number]  Acct # - [de-identified]  Date of Admission - 2023  9:54 AM    Consulting Service/Physician:       Primary Care Physician: Anna Chavis, APRN - CNP    SUBJECTIVE:     Chief Complaint:   Chief Complaint   Patient presents with    Shortness of Breath     Subjective:    Cheryl Rehman presented to the emergency department for having shortness of breath and cough for last 3 to 4 days as well as some diarrhea.  7 fatigue and body aches. She was also having dysuria and some lower leg swelling. She admits to gaining 5 pounds at the nursing facility. In the emergency department she had a normal white blood cell count. Hemoglobin was 11.7. She was negative for influenza and COVID-19 all negative. Urinalysis was negative for UTI. She had a creatinine of 2.39, BUN 50 procalcitonin 0.10. proBNP 1457. Elevated high-sensitivity troponin of 84. She had had a recent high-resolution CT scan performed earlier this month and was noted to have congestive heart failure with mild interstitial edema at the lung bases moderate right and small left pleural effusion. No evidence of interstitial lung disease. She had an echocardiogram performed in January of this year that showed left ventricular ejection fraction of 50 to 55% with mild concentric left ventricular hypertrophy. Normal right ventricular size and function. There is no significant valvular regurgitation or stenosis seen. She is known to our service. She has a history of DVT as well as congestive heart failure with preserved ejection fraction.   History of coronary artery disease status post stent. Stage IV chronic kidney disease previously dialysis dependent, type 2 diabetes as well as chronic hypoxemic respiratory failure baseline of 3 L. She had had a pulmonary function test performed March 22 of this year which showed normal FVC and FEV1 of 50 and 54% predicted. The ratio is normal.  There is no response to bronchodilators. Her diffusing capacity was severely decreased even when corrected for a failure volume was only 59%. Her lung volumes showed mild to moderate restrictive pattern with a total lung capacity of 64% predicted    Upon my evaluation she is on 5 L nasal cannula. She does have a bronchospastic sounding cough. She describes having a cough for over 2 weeks. However her shortness of breath became more abrupt a couple days ago. He denies any fevers or chills. Denies any headache or dizziness. No chest pain. Shortness of breath with minimal exertion. Denies any abdominal pain nausea or vomiting. VITALS  /66   Pulse 65   Temp 98.4 °F (36.9 °C) (Oral)   Resp 22   Ht 5' 5\" (1.651 m)   Wt 247 lb 9.2 oz (112.3 kg)   SpO2 90%   BMI 41.20 kg/m²   Wt Readings from Last 3 Encounters:   02/27/23 247 lb 9.2 oz (112.3 kg)   02/11/23 248 lb (112.5 kg)   02/03/23 248 lb 2.6 oz (112.6 kg)     I/O (24 Hours)    Intake/Output Summary (Last 24 hours) at 2/28/2023 1333  Last data filed at 2/28/2023 1100  Gross per 24 hour   Intake 1640 ml   Output 1200 ml   Net 440 ml     Ventilator:      Exam:   Physical Exam   Constitutional:  Oriented to person, place, and time. Appears well-developed and well-nourished. HENT: Crowded neck, low hanging soft palate  Head: Normocephalic and atraumatic. Eyes: EOM are normal. Pupils are equal, round, and reactive to light. Cardiovascular:  Regular rate and rhythm. Normal heart tones. Pulmonary/Chest: Effort normal and breath sounds are diminished in bases more so on the right.   She does elicit a bronchospastic cough  Abdominal: Nondistended, obese  Neurological:patient is alert and oriented to person, place, and time. Skin: Skin is warm and dry. No rash noted.    Extremities: Chronic lymphedema  Infusions:      sodium chloride      dextrose       Meds:     Current Facility-Administered Medications:     dextromethorphan (DELSYM) 30 MG/5ML extended release liquid 30 mg, 30 mg, Oral, 2 times per day, Antonieta Moya MD    methylPREDNISolone sodium (SOLU-MEDROL) injection 40 mg, 40 mg, IntraVENous, Q6H, Antonieta Moya MD    guaiFENesin [facility]) extended release tablet 600 mg, 600 mg, Oral, BID, Antonieta Moya MD    benzonatate (TESSALON) capsule 100 mg, 100 mg, Oral, TID PRN, Antonieta Moya MD    Doloris Plater ON 3/1/2023] bumetanide (BUMEX) tablet 3 mg, 3 mg, Oral, BID, Samanta De La Paz, APRN - CNP    sodium chloride flush 0.9 % injection 5-40 mL, 5-40 mL, IntraVENous, 2 times per day, Antonieta Moya MD, 10 mL at 02/28/23 0908    sodium chloride flush 0.9 % injection 5-40 mL, 5-40 mL, IntraVENous, PRN, Antonieta Moya MD    0.9 % sodium chloride infusion, , IntraVENous, PRN, Antonieta Moya MD    potassium chloride (KLOR-CON M) extended release tablet 40 mEq, 40 mEq, Oral, PRN **OR** potassium bicarb-citric acid (EFFER-K) effervescent tablet 40 mEq, 40 mEq, Oral, PRN **OR** potassium chloride 10 mEq/100 mL IVPB (Peripheral Line), 10 mEq, IntraVENous, PRN, Antonieta Moya MD    magnesium sulfate 2000 mg in water 50 mL IVPB, 2,000 mg, IntraVENous, PRN, Antonieta Moya MD    ondansetron (ZOFRAN-ODT) disintegrating tablet 4 mg, 4 mg, Oral, Q8H PRN **OR** ondansetron (ZOFRAN) injection 4 mg, 4 mg, IntraVENous, Q6H PRN, Antonieta Moya MD    polyethylene glycol (GLYCOLAX) packet 17 g, 17 g, Oral, Daily PRN, Antonieta Moya MD    acetaminophen (TYLENOL) tablet 650 mg, 650 mg, Oral, Q6H PRN **OR** acetaminophen (TYLENOL) suppository 650 mg, 650 mg, Rectal, Q6H PRN, Antonieta Moya MD    heparin (porcine) injection 5,000 Units, 5,000 Units, SubCUTAneous, 3 times per day, Maurizio Zapata MD, 5,000 Units at 02/28/23 0537    allopurinol (ZYLOPRIM) tablet 100 mg, 100 mg, Oral, Daily, Maurizio Zapata MD, 100 mg at 02/28/23 0949    aspirin chewable tablet 81 mg, 81 mg, Oral, Daily, Maurizio Zapata MD, 81 mg at 02/28/23 0906    amLODIPine (NORVASC) tablet 5 mg, 5 mg, Oral, Daily, Maurizio Zapata MD, 5 mg at 02/28/23 0908    atorvastatin (LIPITOR) tablet 80 mg, 80 mg, Oral, Nightly, Maurizio Zapata MD, 80 mg at 02/27/23 2025    carvedilol (COREG) tablet 6.25 mg, 6.25 mg, Oral, BID, Maurizio Zapata MD, 6.25 mg at 02/28/23 1365    gabapentin (NEURONTIN) capsule 300 mg, 300 mg, Oral, Daily, Maurizio Zapata MD, 300 mg at 02/28/23 0906    insulin glargine (LANTUS) injection vial 45 Units, 45 Units, SubCUTAneous, BID, Maurizio Zapata MD, 45 Units at 02/27/23 2029    glucose chewable tablet 16 g, 4 tablet, Oral, PRN, Maurizio Zapata MD    dextrose bolus 10% 125 mL, 125 mL, IntraVENous, PRN **OR** dextrose bolus 10% 250 mL, 250 mL, IntraVENous, PRN, Maurizio Zapata MD    glucagon (rDNA) injection 1 mg, 1 mg, SubCUTAneous, PRN, Maurizio Zapata MD    dextrose 10 % infusion, , IntraVENous, Continuous PRN, Maurizio Zapata MD    insulin lispro (HUMALOG) injection vial 0-8 Units, 0-8 Units, SubCUTAneous, TID WC, Maurizio Zapata MD, 2 Units at 02/28/23 1139    insulin lispro (HUMALOG) injection vial 0-4 Units, 0-4 Units, SubCUTAneous, Nightly, Maurizio Zapata MD    pantoprazole (PROTONIX) tablet 40 mg, 40 mg, Oral, QAM AC, Maurizio Zapata MD, 40 mg at 02/28/23 0537    tamsulosin (FLOMAX) capsule 0.4 mg, 0.4 mg, Oral, Daily, Maurizio Zapata MD, 0.4 mg at 02/28/23 4663    ticagrelor (BRILINTA) tablet 90 mg, 90 mg, Oral, BID, Maurizio Zapata MD, 90 mg at 02/28/23 0906    furosemide (LASIX) injection 40 mg, 40 mg, IntraVENous, BID, AFSANEH Khan - CNP, 40 mg at 02/28/23 0908    ipratropium-albuterol (Wenda Sink) nebulizer solution 1 ampule, 1 ampule, Inhalation, Q4H WA, Elisa Bowen MD, 1 ampule at 02/28/23 1054    traZODone (DESYREL) tablet 25 mg, 25 mg, Oral, Nightly PRN, Elisa Bowen MD, 25 mg at 02/27/23 2126    albuterol (PROVENTIL) nebulizer solution 2.5 mg, 2.5 mg, Nebulization, Q4H PRN, Dominic Peck MD, 2.5 mg at 02/28/23 0055    Lab Results:     [unfilled]  Lab Results   Component Value Date    WBC 6.9 02/28/2023    HGB 10.8 (L) 02/28/2023    HCT 31.7 (L) 02/28/2023    MCV 94.2 02/28/2023     02/28/2023     Lab Results   Component Value Date    CALCIUM 9.1 02/28/2023     02/28/2023    K 3.6 (L) 02/28/2023    CO2 30 02/28/2023     02/28/2023    BUN 49 (H) 02/28/2023    CREATININE 2.52 (H) 02/28/2023     No components found for: ABGSAMPLETYP, ABGBODYTEMP, ABGPHCORRFOR, [id number], ABGPHCORRFOOR, ABGPH, ABGPCO2, ABGPO2, ABGBASEEXCES, ABGBASEDEFIC, ABGHCO3, SJMJ2PAT, ABGENDTIDALC, ABGALLENSTES, ABGSPO2, ABGSAMEPLESIT, [id number], ABGOXYGENSOU  Lab Results   Component Value Date    INR 1.0 01/26/2023    PROTIME 10.7 01/26/2023       Radiology:   Chest x-ray had showed some vascular congestion with bilateral pleural effusions worse on the right and increased interstitial opacities at the bases.     ASSESSMENT:       Acute CHF exacerbation; evaded BNP  History of diastolic heart failure with preserved ejection fraction  Bilateral pleural effusions  Elevated high-sensitivity troponin  Acute on chronic hypoxemic respiratory failure baseline 3 L nasal cannula  History of coronary artery disease status post stent placement February 2023 on Brilinta  Stage IV chronic kidney disease previously on dialysis taken off approximately 6 months ago  Obstructive sleep apnea  Type 2 diabetes  Morbid obesity  Restrictive lung pattern on recent PFTs  History of DVT  Lymphedema-chronic  Life long non-smoker  Full Code  PLAN:   Recommend nephrology consult and to increase diuresing  Respiratory pathogen panel was added by primary service  We will decrease steroids to 40 mg to every 12. No wheezing on exam.  She does elicit a bronchospastic cough though she has no underlying lung disease  We will increase Delsym to 60 mg twice daily  Unfortunately patient had a recent stent and is on Brilinta would not be a good idea to hold her Brilinta with a recent stent for thoracentesis would like diuresing to be increased if possible  I discussed case and images directly with Dr. Dara Quijano who agrees with current plan of care  I discussed with nursing staff  I spoke with patient's sister over the phone    Thank you for theconsultation ---we will continue to follow      Electronically signed by AFSANEH Lizarraga CNP on 02/28/23     This progress note was completed using a voice transcription system. Every effort was made to ensure accuracy. However, inadvertent computerized transcription errors may be present. [address] [facility]       Clinical status discussed with me. Imaging studies reviewed    Concur with plans.     Ron Anthony M.D.

## 2023-02-28 NOTE — CONSULTS
[facility]    Consult                        Today's Date: 2/28/2023  Patient Name: Vanessa Huang  Date of admission: 2/27/2023  9:54 AM  Patient's age: 57 y.o., 1960  Admission Dx: Hypoxia [R09.02]  Acute on chronic heart failure, unspecified heart failure type ([facility] [address] [I50.9]  Congestive heart failure, unspecified HF chronicity, unspecified heart failure type ([address] [I50.9]    Reason for Consult:  Cardiac evaluation    Requesting Physician: Sulaiman Bass MD    CHIEF COMPLAINT:  shortness of breath    History Obtained From:  patient, electronic medical record    HISTORY OF PRESENT ILLNESS:      The patient is a 57 y.o.  female who is admitted to the hospital for shortness of breath. Patient is a 38-year-old female presents emergency room with complaints of shortness of breath and cough that she has had over the past 3 to 4 days. Patient has had diarrhea over the past 2 days. Patient complains of fatigue and body aches. Patient has had dysuria. Patient has had lower extremity swelling. Patient gained 5 pounds as per the nursing facility. Patient normally wears oxygen but she is hypoxic even on her normal O2. On 6 L nasal cannula patient is satting within normal limits. Patient states that her shortness of breath is worse with exertion as well as lying flat. Patient does have a previous history of congestive heart failure. Patient complains of nausea but no vomiting no chest pain no abdominal pain. Patient was recently seen in hospital earlier this month at which time she underwent a cath and stent placement. She is known to our practice for significant CAD/prior CABG & PCI, HTN, CHF. On my exam patient denies any chest pain. States she was at the rehab facility and all of a sudden could not catch her breath. States she finally got her breathing rate down but then her oxygen would not come up and that is why they called the squad.  States a \"terrible dry cough\" that she states then takes her breath away. States when she coughs her whole torso hurts but denies any localized CP. Patient's sister on phone during exam.     Past Medical History:   has a past medical history of Arthritis, Backache, unspecified, Cerebral artery occlusion with cerebral infarction ([facility] [address] CHF (congestive heart failure) ([facility] [address] Chronic kidney disease, Coronary atherosclerosis of artery bypass graft, COVID, Cramp of limb, Gallstones, Hemodialysis patient ([facility] [address] Hyperlipidemia, Hypertension, Insomnia, Neuromuscular disorder ([facility] [address] Pneumonia, Psychiatric problem, Thyroid disease, Type II or unspecified type diabetes mellitus with renal manifestations, not stated as uncontrolled(250.40), Type II or unspecified type diabetes mellitus without mention of complication, not stated as uncontrolled, and Unspecified vitamin D deficiency. Past Surgical History:   has a past surgical history that includes Coronary artery bypass graft; Knee arthroscopy; Carpal tunnel release; Breast surgery; Tonsillectomy; Hand surgery; Ankle fracture surgery; Cholecystectomy, open (N/A); IR TUNNELED CVC PLACE WO SQ PORT/PUMP > 5 YEARS (08/18/2021); AV fistula creation (12/14/2021); Dialysis fistula creation (Left, 12/14/2021); other surgical history (04/06/2022); back surgery; Colonoscopy; eye surgery; fracture surgery; Cardiac surgery; and Cardiac catheterization (02/14/2023). Home Medications:    Prior to Admission medications    Medication Sig Start Date End Date Taking?  Authorizing Provider   busPIRone (BUSPAR) 7.5 MG tablet Take 7.5 mg by mouth 3 times daily   Yes Historical Provider, MD   ipratropium-albuterol (DUONEB) 0.5-2.5 (3) MG/3ML SOLN nebulizer solution Inhale 1 vial into the lungs every 4 hours   Yes Historical Provider, MD   linaclotide (LINZESS) 145 MCG capsule Take 145 mcg by mouth every morning (before breakfast)   Yes Historical Provider, MD   omeprazole (PRILOSEC) 20 MG delayed release capsule Take 20 mg by mouth daily   Yes Historical Provider, MD   dextromethorphan-guaiFENesin (ROBITUSSIN COLD COUGH+ CHEST)  MG/5ML syrup Take 10 mLs by mouth every 8 hours as needed for Cough   Yes Historical Provider, MD   carvedilol (COREG) 6.25 MG tablet Take 1 tablet by mouth 2 times daily 2/17/23   Leta Roland MD   amLODIPine (NORVASC) 5 MG tablet Take 1 tablet by mouth daily 2/18/23   Leta Roland MD   gabapentin (NEURONTIN) 100 MG capsule Take 1 capsule by mouth daily for 30 days.  Intended supply: 30 days 2/17/23 3/19/23  AFSANEH Mehta CNP   ticagrelor (BRILINTA) 90 MG TABS tablet Take 1 tablet by mouth 2 times daily 2/14/23 5/15/23  Chasity Anderson MD   atorvastatin (LIPITOR) 80 MG tablet Take 1 tablet by mouth nightly 2/14/23 5/15/23  Chasity Andersno MD   melatonin 3 MG TABS tablet Take 10 mg by mouth nightly as needed (for sleep) Indications: Crow SantoyoBellona Provider, MD   bumetanide (BUMEX) 1 MG tablet Take 3 tablets by mouth 2 times daily F/u with nephro for dose adjustment 2/6/23 3/8/23  Cedric Rangel MD   Continuous Blood Gluc Sensor (DEXCOM G6 SENSOR) MISC 1 each by Does not apply route 4 times daily 1/19/23   AFSANEH Kamara CNP   Continuous Blood Gluc  ([address]) [address] 1 each by Does not apply route 4 times daily 1/19/23   AFSANEH Kamara CNP   sodium bicarbonate 650 MG tablet Take 2 tablets by mouth 2 times daily 1/16/23 1/16/24  Gia Reed MD   insulin glargine [facility]) 100 UNIT/ML injection pen Inject 55 Units into the skin 2 times daily 12/13/22 2/27/23  AFSANEH Kamara CNP   allopurinol (ZYLOPRIM) 100 MG tablet Take 1 tablet by mouth daily 12/8/22   Gia Reed MD   tamsulosin [facility]) 0.4 MG capsule Take 1 capsule by mouth daily 9/27/22   AFSANEH Kamara CNP   Insulin Pen Needle (EASY TOUCH PEN NEEDLES) 29G X 12MM MISC 5 each by Does not apply route daily 9/22/22 1/18/23  AFSANEH Kamara CNP   Blood Glucose Monitoring Suppl (TRUE METRIX GO GLUCOSE METER) w/Device KIT 1 each by Does not apply route 4 times daily 9/19/22   AFSANEH Mark CNP   blood glucose monitor strips Test 3 times a day & as needed for symptoms of irregular blood glucose. Dispense sufficient amount for indicated testing frequency plus additional to accommodate PRN testing needs. 9/19/22   AFSANEH Mark CNP   AZO-CRANBERRY PO Take 2 capsules by mouth daily    Historical Provider, MD   Calcium Polycarbophil (FIBER-CAPS PO) Take 2 capsules by mouth in the morning and at bedtime    Historical Provider, MD   gabapentin (NEURONTIN) 300 MG capsule Take 300 mg by mouth in the morning. Historical Provider, MD   acetaminophen (TYLENOL) 325 MG tablet Take 650 mg by mouth every 6 hours as needed for Pain    Historical Provider, MD   ReliOn Lancets Micro-Thin 33G MISC USE AS DIRECTED EVERY DAY 1/28/22   Historical Provider, MD   HUMALOG 100 UNIT/ML injection vial Inject 15 Units into the skin 3 times daily (before meals) 11/29/21   Historical Provider, MD   Lancets MISC 1 each by Does not apply route daily 11/30/21   AFSANEH Mark CNP   docusate sodium (COLACE) 100 MG capsule Take 1 capsule by mouth 2 times daily 11/22/21   Crow Romano MD   aspirin 81 MG chewable tablet Take 1 tablet by mouth daily 9/25/21   Irvin Merlos MD   allopurinol (ZYLOPRIM) 100 MG tablet Take 1 tablet by mouth daily 4/14/21   AFSANEH Mark CNP       Allergies:  Adhesive tape, Isosorbide dinitrate, Ranexa [ranolazine], Metformin and related, Ace inhibitors, Iv dye [iodides], Nsaids, and Metformin and related    Social History:   reports that she has never smoked. She has never used smokeless tobacco. She reports that she does not drink alcohol and does not use drugs. Family History: family history includes Diabetes in her father; Heart Failure in her father. No h/o sudden cardiac death. No for premature CAD    REVIEW OF SYSTEMS:    Constitutional: there has been no unanticipated weight loss. There's been No change in energy level, No change in activity level. Eyes: No visual changes or diplopia. No scleral icterus. ENT: No Headaches, hearing loss or vertigo. No mouth sores or sore throat. Cardiovascular: see above  Respiratory: see above  Gastrointestinal: No abdominal pain, appetite loss, blood in stools. Genitourinary: No dysuria, trouble voiding, or hematuria. Musculoskeletal:  No gait disturbance, No weakness or joint complaints. Integumentary: No rash or pruritis. Neurological: No headache or diplopia. No tingling  Psychiatric: No anxiety, or depression. Endocrine: No temperature intolerance. Hematologic/Lymphatic: No abnormal bruising or bleeding, blood clots or swollen lymph nodes. Allergic/Immunologic: No nasal congestion or hives. PHYSICAL EXAM:      /66   Pulse 65   Temp 98.4 °F (36.9 °C) (Oral)   Resp 22   Ht 5' 5\" (1.651 m)   Wt 247 lb 9.2 oz (112.3 kg)   SpO2 90%   BMI 41.20 kg/m²    Constitutional and General Appearance: alert, cooperative, no distress and appears stated age  HEENT: PERRL, no cervical lymphadenopathy. No masses palpable. Normal oral mucosa  Respiratory:  Normal excursion and expansion without use of accessory muscles until she gets anxious  Resp Auscultation: Good respiratory effort while calm. Once she gets anxious she has accessory muscle use and tachypnea  Lungs EXTREMELY tight throughout with inspiratory and expiratory wheezing  On NC oxygen 5L  Cardiovascular:  Heart tones are crisp and normal. regular S1 and S2.  Jugular venous pulsation Normal  The carotid upstroke is normal in amplitude and contour without delay or bruit   Abdomen:   soft  Bowel sounds present  Morbidly obese  Extremities:   No edema  Neurological:  Alert and oriented. DATA:    Diagnostics:    EKG: normal sinus rhythm, nonspecific ST and T waves changes. ECHO: previously taken and show as below.    Ejection fraction: 50%  Stress Test: previously taken and show as below. Cardiac Angiography: obtained and reviewed as below. ECHO: 1/27/23  Summary  Global left ventricular systolic function is normal.  Estimated LVEF 50-55%. Mild concentric left ventricular hypertrophy. Normal right ventricular size and function. No significant valvular regurgitation or stenosis seen. No pericardial effusion seen. Stress Test: 2/13/23  Impression       Large infarct in the lateral wall extending into the cardiac apex with some   stalin-infarct ischemia in anterolateral wall. LVEF normal.       Risk stratification: High risk patient has no prior history of evidence of   MI. Low risk if there is known prior history. Cardiac Angiography: 2/14/23  Findings:  Cardiac Arteries and Lesion Findings  LMCA: has 80% diffuse disease. LAD: has proximal 100% occlusion. LIMA-LAD is patent. SVG-D is patent. LCx: has proximal 100% occlusion. SVG-OM 1 is patent with severe diffuse disease post anastomosis of  SVG-OM is known and not amenable to PCI. RCA: has proximal patent stent with mid 85% stenosis. RPDA has diffuse 80% stenosis but is a small  vessel. RPL is normal.  Lesion on Mid RCA: Mid subsection. 85% stenosis 20 mm length reduced to 0%. Pre procedure  ABEL III flow was noted. Post Procedure ABEL III flow was present. Good runoff was present. The lesion  was diagnosed as High Risk (C). The lesion was discrete and eccentric. The lesion showed with  irregular contour, mild angulation and mild tortuosity. Devices used  Capital One Flex 180 cm. Number of passes: 1.   [place] Balloon 2.5mm x 15mm. 3 inflation(s) to a max pressure of: 20 jayne.  [place] 2.75x22. 1 inflation(s) to a max pressure of: 20 jayne.  [place] Euphora Balloon 3.0mm x 20mm. 2 inflation(s) to a max pressure of: 20 jayne     Dominance: Right     Conclusions:     Multivessel coronary artery disease. Patent LIMA-LAD, SVG-D and SVG-OM1.  Severe diffuse disease post anastomosis of SVG-OM is known  and not amenable to PCI. Patent proximal RCA stent with new mid severe stenosis. RPDA has diffuse severe stenosis but small for  PCI. Successful PTCA/FADIA of mid RCA. Total contrast used 70ml. Labs:     CBC:   Recent Labs     02/27/23  1027 02/28/23  0537   WBC 8.5 6.9   HGB 11.7* 10.8*   HCT 35.5* 31.7*    174     BMP:   Recent Labs     02/27/23  1027 02/28/23  0537    144   K 4.3 3.6*   CO2 25 30   BUN 50* 49*   CREATININE 2.39* 2.52*   LABGLOM 22* 21*   GLUCOSE 128* 73     BNP: No results for input(s): BNP in the last 72 hours. PT/INR: No results for input(s): PROTIME, INR in the last 72 hours. APTT:No results for input(s): APTT in the last 72 hours. CARDIAC ENZYMES:No results for input(s): CKTOTAL, CKMB, CKMBINDEX, TROPONINI in the last 72 hours. FASTING LIPID PANEL:  Lab Results   Component Value Date/Time    HDL 43 04/09/2015 12:00 AM    LDLCALC 28 04/09/2015 12:00 AM    TRIG 168 04/09/2015 12:00 AM     LIVER PROFILE:No results for input(s): AST, ALT, LABALBU in the last 72 hours.     IMPRESSION:    Patient Active Problem List   Diagnosis    Atherosclerosis of coronary artery bypass graft of native heart without angina pectoris    Acute kidney injury superimposed on CKD ([address]    Acute on chronic diastolic heart failure ([address]    Diabetic polyneuropathy associated with type 2 diabetes mellitus ([address]    History of coronary artery bypass graft    Iron deficiency anemia    Spinal stenosis of lumbar region with neurogenic claudication    Mixed hyperlipidemia    CKD (chronic kidney disease) stage 4, GFR 15-29 ml/min ([facility])    Type 2 diabetes 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, with long-term current use of insulin ([facility])    Obesity, Class II, BMI 35-39.9    Thyroid nodule greater than or equal to 1 cm in diameter incidentally noted on imaging study    Hypertension, essential    Chronic ischemic heart disease    Ischemic stroke of frontal lobe (HCC)    Morbid obesity with BMI of 40.0-44.9, adult (HCC)    Disequilibrium syndrome    Anxiety    Chronic midline low back pain with bilateral sciatica    COVID    Cerebral hypoperfusion    Immature arteriovenous fistula (HCC)    History of fusion of cervical spine    Depression with anxiety    Vancomycin resistant Enterococcus UTI    Dialysis disequilibrium syndrome    Acute cystitis without hematuria    VRE infection (vancomycin resistant Enterococcus)    Infection due to ESBL-producing Klebsiella pneumoniae    Class 2 severe obesity due to excess calories with serious comorbidity and body mass index (BMI) of 35.0 to 35.9 in adult [facility])    Type 2 diabetes mellitus with hyperglycemia, with long-term current use of insulin (HCC)    Right hemiparesis (HCC)    Ulcer of left foot, limited to breakdown of skin ([address]    Secondary hyperparathyroidism ([facility] [address]    Hypertensive urgency    Hypoxia    Congestive heart failure ([facility] [address]    Nephrotic range proteinuria    Acute respiratory failure with hypoxia (HCC)    Syncope and collapse    Subacute cough    Acute on chronic heart failure, unspecified heart failure type (HCC)    Diarrhea of presumed infectious origin     Chronic HFpEF  Chronic hypoxic resp failure  CAD with hx of CABG and recent PCI as above  CKD IV with baseline creat 2.6-3  HTN  Hx of frequent/recurrent UTI  ERICK  Morbid obesity with BMI > 40  Hx of DVT  Chronic lymphedema      RECOMMENDATIONS:  Seen & examined in room. Clinically she is extremely tight and wheezing. She does not have any LE edema and pBNP is down from 2/15/23. Creat stable. Would hold further IV diuresis and reassess in AM. Continue PO BB. EF on recent echo normal  Recent PTCA/FADIA RCA as above. No ECG changes. Continue PO ASA, statin, Coreg, & Brilinta. BP stable. Continue Coreg & Norvasc. Pulmonary eval recommended. On IV steriods presently.   Will follow      Discussed with patient and Nurse    AFSANEH Douglas - [address] [facility] Cardiology Consult           [phone]

## 2023-02-28 NOTE — PROGRESS NOTES
Physical Therapy  Facility/Department: [facility] PROGRESSIVE CARE  Physical Therapy Initial Assessment    Name: Laurent Hicks  : 1960  MRN: [id number]  Date of Service: 2023    Discharge Recommendations:  Patient would benefit from continued therapy after discharge          Patient Diagnosis(es): The encounter diagnosis was Congestive heart failure, unspecified HF chronicity, unspecified heart failure type ([address] Past Medical History:  has a past medical history of Arthritis, Backache, unspecified, Cerebral artery occlusion with cerebral infarction ([address] CHF (congestive heart failure) ([address] Chronic kidney disease, Coronary atherosclerosis of artery bypass graft, COVID, Cramp of limb, Gallstones, Hemodialysis patient ([address] Hyperlipidemia, Hypertension, Insomnia, Neuromuscular disorder ([address] Pneumonia, Psychiatric problem, Thyroid disease, Type II or unspecified type diabetes mellitus with renal manifestations, not stated as uncontrolled(250.40), Type II or unspecified type diabetes mellitus without mention of complication, not stated as uncontrolled, and Unspecified vitamin D deficiency. Past Surgical History:  has a past surgical history that includes Coronary artery bypass graft; Knee arthroscopy; Carpal tunnel release; Breast surgery; Tonsillectomy; Hand surgery; Ankle fracture surgery; Cholecystectomy, open (N/A); IR TUNNELED CVC PLACE WO SQ PORT/PUMP > 5 YEARS (2021); AV fistula creation (2021); Dialysis fistula creation (Left, 2021); other surgical history (2022); back surgery; Colonoscopy; eye surgery; fracture surgery; Cardiac surgery; and Cardiac catheterization (2023).     Assessment   Assessment: The patient demonstrates impaired activity tolerance and endurance 2* acute respiratory failure with hypoxia  Treatment Diagnosis: Impaired endurance and mobility 2* respiratory failure  Specific Instructions for Next Treatment: Bed mobility, Supine/seated Exercise; Diaphragmatic breathing exercises  Therapy Prognosis: Fair  Decision Making: Medium Complexity  Exam: ROM, Observational strength assessment, Bed mobility  Clinical Presentation: pt is alert, pleasant, anxious  Requires PT Follow-Up: Yes  Activity Tolerance  Activity Tolerance: Treatment limited secondary to medical complications; Patient limited by endurance  Activity Tolerance Comments: Limited by respiratory status and anxiety. Pt becomes tearful few times throughout session along with episodes of anxiety/panic     Plan   Physcial Therapy Plan  General Plan: 5-7 times per week  Specific Instructions for Next Treatment: Bed mobility, Supine/seated Exercise; Diaphragmatic breathing exercises  Current Treatment Recommendations: Strengthening, Balance training, Functional mobility training, Transfer training, Endurance training, Gait training, Patient/Caregiver education & training, Safety education & training, Equipment evaluation, education, & procurement, Therapeutic activities  Safety Devices  Type of Devices: All fall risk precautions in place, Call light within reach, Bed alarm in place, Gait belt, Patient at risk for falls, Left in bed, Nurse notified     Restrictions  Restrictions/Precautions  Restrictions/Precautions: Fall Risk, General Precautions  Required Braces or Orthoses?: Yes (Lymphedema)     Subjective   Pain: pt denies pain but does state chest discomfort from frequent coughing  General  Chart Reviewed: Yes  Patient assessed for rehabilitation services?: Yes  Additional Pertinent Hx: The patient is a 57 y.o.  female who presented with worsening shortness of breath for the past 3 days. She also has had diarrhea. Pt complains of lower leg swelling though this is a chronic finding. Pt came from nursing facility. She was desatting to the 80s on home O2. Pt required 6L in the ER. CXR showed interstitial opacities at the bases and elevated pro-bnp. The patient was seen and examined at bedside.  She feels better on 6L, denies fevers.     she is admitted to the hospital for acute hypoxic respiratory failure & CHF exacerbation.  Response To Previous Treatment: Not applicable  Family / Caregiver Present: No  Referring Practitioner: Priscila Rob MD  Referral Date : 02/27/23  Diagnosis: Acute Respiratory failure with hypoxia; Acute on chronic heart failure, unspecified heart failure type  Follows Commands: Within Functional Limits  General Comment  Comments: DEDE Montaño permits PT to proceed with light mobility and assessments  Subjective  Subjective: Pt is agreeable to mobilize to EOB at most this date but does explain in detail her difficulties breathing this date, \"I don't even think I could get up to walk right now even if I wanted to\"         Social/Functional History  Social/Functional History  Lives With: Parent (Mother-caretaker for mom)  Type of Home: Condo  Home Layout: One level  Home Access: Ramped entrance  Bathroom Shower/Tub: Walk-in shower, Shower chair with back, Curtain  Bathroom Toilet: Handicap height  Bathroom Equipment: Grab bars in shower, Shower chair, Hand-held shower  Home Equipment: Cane, Rollator, Walker, rolling, Oxygen, Wheelchair-manual (O2 3-4L)  ADL Assistance: Independent  Homemaking Assistance: Needs assistance (Sisters assisting)  Homemaking Responsibilities: No  Ambulation Assistance: Independent (with use of rollator)  Transfer Assistance: Independent  Active : No  Patient's  Info: sisters assist with transportation, med cab to and from dialysis  Mode of Transportation:  (Pt. can return back to [facility], in [facility] Van)  Occupation: On disability  IADL Comments: Pt sleeps in adjustable bed  Additional Comments: Pt recently at [facility] x2-3 weeks. Admitted from facility on this admission  Vision/Hearing  Vision  Vision: Impaired  Vision Exceptions: Wears glasses for reading  Hearing  Hearing: Within functional limits    Cognition   Orientation  Overall Orientation  Status: Within Functional Limits  Orientation Level: Oriented X4     Objective   Heart Rate: 68  Heart Rate Source: Monitor  BP: 138/66  BP Location: Right upper arm  BP Method: Automatic  Patient Position: Semi fowlers  MAP (Calculated): 90  Resp: 20  SpO2: 90 %  O2 Device: Nasal cannula  Comment: pt demos Spo2 desaturation with light bed mobilty and while seated EOB. Lowest Desaturation noted at 77% during an episode of panic/anxiety, with delayed rebound to mid 80's with calming therapeutic presence and cues for diaphragmatic and pursed lip breathing     Observation/Palpation  Observation: Pt mildly SOB at rest lying in bed, poor tolerance for activity and positional change        AROM RLE (degrees)  RLE AROM: WFL  AROM LLE (degrees)  LLE AROM : WFL  AROM RUE (degrees)  RUE General AROM: See OT  AROM LUE (degrees)  LUE General AROM: See OT  Strength RLE  Strength RLE: WFL  Comment: pt demo at least 3/5 strength via observation of BLE AROM against gravity and bed mobility  Strength LLE  Strength LLE: WFL  Comment: pt demo at least 3/5 strength via observation of BLE AROM against gravity and bed mobility  Strength RUE  Comment: See OT  Strength LUE  Comment: See OT           Bed mobility  Supine to Sit: Moderate assistance  Sit to Supine: Moderate assistance  Scooting: Minimal assistance  Bed Mobility Comments: Bed mobility completed with HOB elevated, use of rails, with increased time and rest breaks due to labored breathing. Verbal and visual cues for pursed lip and diaphragmatic breathing with Fair carryover. Pt complains of dizzy/lightheadedness and nausea, no episodes of emesis. Pt states, \"i'm going to pass out\" and is prompty assisted back into bed, pt becomes panicked requesting writer to help her sit back up \"I can't breathe! \". Pt dangles EOB ~6 minutes in total while vitals are monitored and Nurse is notified.   Transfers  Comment: PINKY transfers this date d/t respiratory status and SpO2 Desaturation with light bed mobility  Ambulation  Comments: PINKY gait this date d/t respiratory status and SpO2 Desaturation with light bed mobility  Stairs/Curb  Stairs?: No     Balance  Posture: Fair  Sitting - Static: Fair;+  Sitting - Dynamic: Fair  Standing - Static:  (PINKY)  Standing - Dynamic:  (PINKY)  Exercise Treatment: Bed mobility, EOB dangling x6 min  Breathing Techniques: diaphragmatic breathing and pursed lip breathing techniques        OutComes Score    AM-PAC Score  AM-PAC Inpatient Mobility Raw Score : 11 (02/28/23 1410)  AM-PAC Inpatient T-Scale Score : 33.86 (02/28/23 1410)  Mobility Inpatient CMS 0-100% Score: 72.57 (02/28/23 1410)  Mobility Inpatient CMS G-Code Modifier : CL (02/28/23 1410)     Goals  Short Term Goals  Time Frame for Short Term Goals: 8 visits  Short Term Goal 1: pt to demo functional bed mobility with Jose  Short Term Goal 2: pt to tolerate 30 minutes of therapeutic activity and exercise while maintaining SpO2 of 90% or greater  Short Term Goal 3: Pt to demo good technique for Core and BLE strengthening exercises  Short Term Goal 4: PT to assess OOB transfers, gait, and stair climbing when medically appropriate       Education  Patient Education  Education Given To: Patient  Education Provided: Role of Therapy;Plan of Care  Education Provided Comments: Breathing techniques  Education Method: Demonstration;Verbal  Barriers to Learning: None  Education Outcome: Verbalized understanding;Demonstrated understanding;Continued education needed    Therapy Time   Individual Concurrent Group Co-treatment   Time In 1408         Time Out 1434         Minutes 26         Timed Code Treatment Minutes: 9 Minutes     Dell Merlos, PT

## 2023-03-01 ENCOUNTER — APPOINTMENT (OUTPATIENT)
Dept: GENERAL RADIOLOGY | Age: 63
DRG: 291 | End: 2023-03-01
Payer: COMMERCIAL

## 2023-03-01 LAB
ABSOLUTE EOS #: 0 K/UL (ref 0–0.4)
ABSOLUTE LYMPH #: 0.4 K/UL (ref 1–4.8)
ABSOLUTE MONO #: 0.1 K/UL (ref 0.1–1.3)
ANION GAP SERPL CALCULATED.3IONS-SCNC: 10 MMOL/L (ref 9–17)
BASOPHILS # BLD: 0 % (ref 0–2)
BASOPHILS ABSOLUTE: 0 K/UL (ref 0–0.2)
BUN SERPL-MCNC: 55 MG/DL (ref 8–23)
CALCIUM SERPL-MCNC: 8.8 MG/DL (ref 8.6–10.4)
CHLORIDE SERPL-SCNC: 100 MMOL/L (ref 98–107)
CO2 SERPL-SCNC: 28 MMOL/L (ref 20–31)
CREAT SERPL-MCNC: 2.66 MG/DL (ref 0.5–0.9)
EOSINOPHILS RELATIVE PERCENT: 0 % (ref 0–4)
GFR SERPL CREATININE-BSD FRML MDRD: 19 ML/MIN/1.73M2
GLUCOSE BLD-MCNC: 309 MG/DL (ref 65–105)
GLUCOSE BLD-MCNC: 350 MG/DL (ref 65–105)
GLUCOSE BLD-MCNC: 382 MG/DL (ref 65–105)
GLUCOSE BLD-MCNC: 427 MG/DL (ref 65–105)
GLUCOSE BLD-MCNC: 442 MG/DL (ref 65–105)
GLUCOSE BLD-MCNC: 458 MG/DL (ref 65–105)
GLUCOSE BLD-MCNC: 486 MG/DL (ref 65–105)
GLUCOSE SERPL-MCNC: 355 MG/DL (ref 70–99)
HCT VFR BLD AUTO: 32.6 % (ref 36–46)
HGB BLD-MCNC: 10.8 G/DL (ref 12–16)
LYMPHOCYTES # BLD: 5 % (ref 24–44)
MCH RBC QN AUTO: 32 PG (ref 26–34)
MCHC RBC AUTO-ENTMCNC: 33.3 G/DL (ref 31–37)
MCV RBC AUTO: 96.1 FL (ref 80–100)
MONOCYTES # BLD: 1 % (ref 1–7)
PDW BLD-RTO: 14.8 % (ref 11.5–14.9)
PLATELET # BLD AUTO: 154 K/UL (ref 150–450)
PMV BLD AUTO: 8.7 FL (ref 6–12)
POTASSIUM SERPL-SCNC: 5 MMOL/L (ref 3.7–5.3)
RBC # BLD: 3.39 M/UL (ref 4–5.2)
SEG NEUTROPHILS: 94 % (ref 36–66)
SEGMENTED NEUTROPHILS ABSOLUTE COUNT: 6.8 K/UL (ref 1.3–9.1)
SODIUM SERPL-SCNC: 138 MMOL/L (ref 135–144)
WBC # BLD AUTO: 7.2 K/UL (ref 3.5–11)

## 2023-03-01 PROCEDURE — 6370000000 HC RX 637 (ALT 250 FOR IP): Performed by: STUDENT IN AN ORGANIZED HEALTH CARE EDUCATION/TRAINING PROGRAM

## 2023-03-01 PROCEDURE — 6370000000 HC RX 637 (ALT 250 FOR IP): Performed by: NURSE PRACTITIONER

## 2023-03-01 PROCEDURE — 94664 DEMO&/EVAL PT USE INHALER: CPT

## 2023-03-01 PROCEDURE — 80048 BASIC METABOLIC PNL TOTAL CA: CPT

## 2023-03-01 PROCEDURE — 85025 COMPLETE CBC W/AUTO DIFF WBC: CPT

## 2023-03-01 PROCEDURE — 82947 ASSAY GLUCOSE BLOOD QUANT: CPT

## 2023-03-01 PROCEDURE — 6360000002 HC RX W HCPCS: Performed by: STUDENT IN AN ORGANIZED HEALTH CARE EDUCATION/TRAINING PROGRAM

## 2023-03-01 PROCEDURE — 6360000002 HC RX W HCPCS: Performed by: NURSE PRACTITIONER

## 2023-03-01 PROCEDURE — 99232 SBSQ HOSP IP/OBS MODERATE 35: CPT | Performed by: STUDENT IN AN ORGANIZED HEALTH CARE EDUCATION/TRAINING PROGRAM

## 2023-03-01 PROCEDURE — 36415 COLL VENOUS BLD VENIPUNCTURE: CPT

## 2023-03-01 PROCEDURE — 94640 AIRWAY INHALATION TREATMENT: CPT

## 2023-03-01 PROCEDURE — 97530 THERAPEUTIC ACTIVITIES: CPT

## 2023-03-01 PROCEDURE — 71045 X-RAY EXAM CHEST 1 VIEW: CPT

## 2023-03-01 PROCEDURE — 97110 THERAPEUTIC EXERCISES: CPT

## 2023-03-01 PROCEDURE — 94761 N-INVAS EAR/PLS OXIMETRY MLT: CPT

## 2023-03-01 PROCEDURE — 2580000003 HC RX 258: Performed by: STUDENT IN AN ORGANIZED HEALTH CARE EDUCATION/TRAINING PROGRAM

## 2023-03-01 PROCEDURE — 2700000000 HC OXYGEN THERAPY PER DAY

## 2023-03-01 PROCEDURE — 2060000000 HC ICU INTERMEDIATE R&B

## 2023-03-01 RX ORDER — INSULIN LISPRO 100 [IU]/ML
0-16 INJECTION, SOLUTION INTRAVENOUS; SUBCUTANEOUS EVERY 4 HOURS
Status: DISCONTINUED | OUTPATIENT
Start: 2023-03-01 | End: 2023-03-02

## 2023-03-01 RX ORDER — INSULIN GLARGINE 100 [IU]/ML
55 INJECTION, SOLUTION SUBCUTANEOUS 2 TIMES DAILY
Status: DISCONTINUED | OUTPATIENT
Start: 2023-03-01 | End: 2023-03-01

## 2023-03-01 RX ORDER — INSULIN LISPRO 100 [IU]/ML
0-16 INJECTION, SOLUTION INTRAVENOUS; SUBCUTANEOUS
Status: DISCONTINUED | OUTPATIENT
Start: 2023-03-01 | End: 2023-03-01

## 2023-03-01 RX ORDER — LANOLIN ALCOHOL/MO/W.PET/CERES
3 CREAM (GRAM) TOPICAL NIGHTLY PRN
Status: DISCONTINUED | OUTPATIENT
Start: 2023-03-02 | End: 2023-03-01

## 2023-03-01 RX ORDER — LANOLIN ALCOHOL/MO/W.PET/CERES
3 CREAM (GRAM) TOPICAL NIGHTLY PRN
Status: DISCONTINUED | OUTPATIENT
Start: 2023-03-01 | End: 2023-03-08 | Stop reason: HOSPADM

## 2023-03-01 RX ORDER — INSULIN LISPRO 100 [IU]/ML
0-4 INJECTION, SOLUTION INTRAVENOUS; SUBCUTANEOUS NIGHTLY
Status: DISCONTINUED | OUTPATIENT
Start: 2023-03-01 | End: 2023-03-01

## 2023-03-01 RX ORDER — INSULIN GLARGINE 100 [IU]/ML
60 INJECTION, SOLUTION SUBCUTANEOUS 2 TIMES DAILY
Status: DISCONTINUED | OUTPATIENT
Start: 2023-03-01 | End: 2023-03-02

## 2023-03-01 RX ORDER — ALBUTEROL SULFATE 2.5 MG/3ML
2.5 SOLUTION RESPIRATORY (INHALATION) EVERY 4 HOURS PRN
Status: DISCONTINUED | OUTPATIENT
Start: 2023-03-01 | End: 2023-03-08 | Stop reason: SDUPTHER

## 2023-03-01 RX ADMIN — INSULIN LISPRO 16 UNITS: 100 INJECTION, SOLUTION INTRAVENOUS; SUBCUTANEOUS at 12:11

## 2023-03-01 RX ADMIN — METHYLPREDNISOLONE SODIUM SUCCINATE 40 MG: 40 INJECTION, POWDER, FOR SOLUTION INTRAMUSCULAR; INTRAVENOUS at 18:25

## 2023-03-01 RX ADMIN — CARVEDILOL 6.25 MG: 6.25 TABLET, FILM COATED ORAL at 08:34

## 2023-03-01 RX ADMIN — GABAPENTIN 300 MG: 300 CAPSULE ORAL at 08:34

## 2023-03-01 RX ADMIN — TAMSULOSIN HYDROCHLORIDE 0.4 MG: 0.4 CAPSULE ORAL at 08:35

## 2023-03-01 RX ADMIN — IPRATROPIUM BROMIDE AND ALBUTEROL SULFATE 1 AMPULE: 2.5; .5 SOLUTION RESPIRATORY (INHALATION) at 15:43

## 2023-03-01 RX ADMIN — HEPARIN SODIUM 5000 UNITS: 5000 INJECTION INTRAVENOUS; SUBCUTANEOUS at 22:08

## 2023-03-01 RX ADMIN — Medication 60 MG: at 22:07

## 2023-03-01 RX ADMIN — Medication 60 MG: at 08:35

## 2023-03-01 RX ADMIN — SODIUM CHLORIDE, PRESERVATIVE FREE 10 ML: 5 INJECTION INTRAVENOUS at 08:40

## 2023-03-01 RX ADMIN — INSULIN LISPRO 16 UNITS: 100 INJECTION, SOLUTION INTRAVENOUS; SUBCUTANEOUS at 04:42

## 2023-03-01 RX ADMIN — SODIUM CHLORIDE, PRESERVATIVE FREE 10 ML: 5 INJECTION INTRAVENOUS at 22:08

## 2023-03-01 RX ADMIN — TICAGRELOR 90 MG: 90 TABLET ORAL at 22:07

## 2023-03-01 RX ADMIN — INSULIN LISPRO 16 UNITS: 100 INJECTION, SOLUTION INTRAVENOUS; SUBCUTANEOUS at 16:11

## 2023-03-01 RX ADMIN — BUMETANIDE 3 MG: 1 TABLET ORAL at 18:26

## 2023-03-01 RX ADMIN — ACETAMINOPHEN 650 MG: 325 TABLET ORAL at 00:02

## 2023-03-01 RX ADMIN — GUAIFENESIN 600 MG: 600 TABLET, EXTENDED RELEASE ORAL at 08:35

## 2023-03-01 RX ADMIN — ASPIRIN 81 MG: 81 TABLET, CHEWABLE ORAL at 08:35

## 2023-03-01 RX ADMIN — BUMETANIDE 3 MG: 1 TABLET ORAL at 08:35

## 2023-03-01 RX ADMIN — INSULIN GLARGINE 55 UNITS: 100 INJECTION, SOLUTION SUBCUTANEOUS at 08:38

## 2023-03-01 RX ADMIN — INSULIN LISPRO 16 UNITS: 100 INJECTION, SOLUTION INTRAVENOUS; SUBCUTANEOUS at 18:26

## 2023-03-01 RX ADMIN — PANTOPRAZOLE SODIUM 40 MG: 40 TABLET, DELAYED RELEASE ORAL at 06:42

## 2023-03-01 RX ADMIN — METHYLPREDNISOLONE SODIUM SUCCINATE 40 MG: 40 INJECTION, POWDER, FOR SOLUTION INTRAMUSCULAR; INTRAVENOUS at 06:43

## 2023-03-01 RX ADMIN — IPRATROPIUM BROMIDE AND ALBUTEROL SULFATE 1 AMPULE: 2.5; .5 SOLUTION RESPIRATORY (INHALATION) at 07:30

## 2023-03-01 RX ADMIN — GUAIFENESIN 600 MG: 600 TABLET, EXTENDED RELEASE ORAL at 22:07

## 2023-03-01 RX ADMIN — AMLODIPINE BESYLATE 5 MG: 5 TABLET ORAL at 08:34

## 2023-03-01 RX ADMIN — IPRATROPIUM BROMIDE AND ALBUTEROL SULFATE 1 AMPULE: 2.5; .5 SOLUTION RESPIRATORY (INHALATION) at 20:04

## 2023-03-01 RX ADMIN — ALLOPURINOL 100 MG: 100 TABLET ORAL at 08:34

## 2023-03-01 RX ADMIN — TICAGRELOR 90 MG: 90 TABLET ORAL at 08:35

## 2023-03-01 RX ADMIN — ATORVASTATIN CALCIUM 80 MG: 80 TABLET, FILM COATED ORAL at 22:07

## 2023-03-01 RX ADMIN — INSULIN LISPRO 16 UNITS: 100 INJECTION, SOLUTION INTRAVENOUS; SUBCUTANEOUS at 22:16

## 2023-03-01 RX ADMIN — HEPARIN SODIUM 5000 UNITS: 5000 INJECTION INTRAVENOUS; SUBCUTANEOUS at 15:28

## 2023-03-01 RX ADMIN — INSULIN GLARGINE 60 UNITS: 100 INJECTION, SOLUTION SUBCUTANEOUS at 22:16

## 2023-03-01 RX ADMIN — IPRATROPIUM BROMIDE AND ALBUTEROL SULFATE 1 AMPULE: 2.5; .5 SOLUTION RESPIRATORY (INHALATION) at 10:55

## 2023-03-01 RX ADMIN — HEPARIN SODIUM 5000 UNITS: 5000 INJECTION INTRAVENOUS; SUBCUTANEOUS at 06:42

## 2023-03-01 RX ADMIN — CARVEDILOL 6.25 MG: 6.25 TABLET, FILM COATED ORAL at 22:07

## 2023-03-01 ASSESSMENT — ENCOUNTER SYMPTOMS
COUGH: 1
SHORTNESS OF BREATH: 1
ABDOMINAL PAIN: 0
NAUSEA: 0
VOMITING: 0

## 2023-03-01 ASSESSMENT — PAIN DESCRIPTION - LOCATION: LOCATION: HEAD

## 2023-03-01 ASSESSMENT — PAIN DESCRIPTION - DESCRIPTORS: DESCRIPTORS: ACHING

## 2023-03-01 ASSESSMENT — PAIN SCALES - GENERAL: PAINLEVEL_OUTOF10: 3

## 2023-03-01 NOTE — CONSULTS
Department of Internal Medicine  Nephrology George Pool MD   Consult Note    Reason for consultation: Management of chronic kidney disease stage IV. Consulting physician: Romi Medina MD.    History of present illness: This is a 57 y.o. female with a significant past medical history of Obesity, type 2 diabetes mellitus with diabetic nephropathy, coronary artery disease [s/p CABG x3], systemic hypertension, heart failure with preserved ejection fraction [HFrEF] and chronic kidney disease stage IV secondary to diabetic and hypertensive nephropathy [was on hemodialysis for several months until discontinuation in August 2022 due to some recovery of renal function], who was sent in from the [address] [place] [address] for further evaluation of hypoxia. She stated that she has had progressively worsening shortness of breath and cough over the past 3 days prior to presentation and had diarrhea for 2 days. She also complains of general body aches, fatigue and dysuria. She is on chronic oxygen at the facility usually at 3 L/min flow. Oxygen saturation was in the 80% range even with increasing O2 flow to 6 L/min and hence presentation to the hospital.  Vital signs were stable at presentation with blood pressure 130/68 mmHg. Chest x-ray at presentation showed mild pulmonary vascular congestion with increased interstitial opacities at the lung bases. Laboratory studies revealed BUN/creatinine 50/2.39 mg/dL and hence nephrology consultation.     Adhesive tape, Isosorbide dinitrate, Ranexa [ranolazine], Metformin and related, Ace inhibitors, Iv dye [iodides], Nsaids, and Metformin and related    Past Medical History:   Diagnosis Date    Arthritis     Backache, unspecified     Cerebral artery occlusion with cerebral infarction [facility])     CHF (congestive heart failure) (HCC)     Chronic kidney disease     Coronary atherosclerosis of artery bypass graft     COVID 1/31/2022    Cramp of limb     Gallstones Hemodialysis patient ([facility]     Hyperlipidemia     Hypertension     Insomnia     Neuromuscular disorder ([facility]     Pneumonia     Psychiatric problem     Thyroid disease     Type II or unspecified type diabetes mellitus with renal manifestations, not stated as uncontrolled(250.40)     Type II or unspecified type diabetes mellitus without mention of complication, not stated as uncontrolled     Unspecified vitamin D deficiency      Scheduled Meds:   insulin lispro  0-16 Units SubCUTAneous TID WC    insulin glargine  60 Units SubCUTAneous BID    guaiFENesin  600 mg Oral BID    bumetanide  3 mg Oral BID    dextromethorphan  60 mg Oral 2 times per day    methylPREDNISolone  40 mg IntraVENous Q12H    insulin lispro  0-4 Units SubCUTAneous Nightly    sodium chloride flush  5-40 mL IntraVENous 2 times per day    heparin (porcine)  5,000 Units SubCUTAneous 3 times per day    allopurinol  100 mg Oral Daily    aspirin  81 mg Oral Daily    amLODIPine  5 mg Oral Daily    atorvastatin  80 mg Oral Nightly    carvedilol  6.25 mg Oral BID    gabapentin  300 mg Oral Daily    pantoprazole  40 mg Oral QAM AC    tamsulosin  0.4 mg Oral Daily    ticagrelor  90 mg Oral BID    ipratropium-albuterol  1 ampule Inhalation Q4H WA     Continuous Infusions:   sodium chloride      dextrose       PRN Meds:.benzonatate, sodium chloride flush, sodium chloride, magnesium sulfate, ondansetron **OR** ondansetron, polyethylene glycol, acetaminophen **OR** acetaminophen, glucose, dextrose bolus **OR** dextrose bolus, glucagon (rDNA), dextrose, albuterol    Family History   Problem Relation Age of Onset    Diabetes Father     Heart Failure Father      Social History     Socioeconomic History    Marital status: Single   Tobacco Use    Smoking status: Never    Smokeless tobacco: Never   Vaping Use    Vaping Use: Never used   Substance and Sexual Activity    Alcohol use: No    Drug use: No    Sexual activity: Not Currently     Social Determinants of Health Financial Resource Strain: Low Risk     Difficulty of Paying Living Expenses: Not very hard   Physical Activity: Insufficiently Active    Days of Exercise per Week: 1 day    Minutes of Exercise per Session: 20 min     Review of systems: CNS - no headache or dizziness; Cardiac - no chest pain; Respiratory - +ve cough and shortness of breath; Gastrointestinal - No nausea or vomiting; +diarrhea; Musculoskeletal - general body aches; Skin/Integument - no rashes. Physical Exam:    VITALS:  /75   Pulse 72   Temp 98.2 °F (36.8 °C) (Oral)   Resp 16   Ht 5' 5\" (1.651 m)   Wt 243 lb 13.3 oz (110.6 kg)   SpO2 94%   BMI 40.58 kg/m²   TEMPERATURE:  Current - Temp: 98.2 °F (36.8 °C); Max - Temp  Av.5 °F (36.9 °C)  Min: 98.2 °F (36.8 °C)  Max: 99.1 °F (37.3 °C)  RESPIRATIONS RANGE: Resp  Av.7  Min: 16  Max: 20  PULSE RANGE: Pulse  Av.2  Min: 64  Max: 74  BLOOD PRESSURE RANGE:  Systolic (10ZPU), IKI:600 , Min:119 , QHL:442  ; Diastolic (49SJS), GFC:39, Min:47, Max:75   PULSE OXIMETRY RANGE: SpO2  Av.7 %  Min: 84 %  Max: 94 %  24HR INTAKE/OUTPUT:    Intake/Output Summary (Last 24 hours) at 3/1/2023 1404  Last data filed at 3/1/2023 1243  Gross per 24 hour   Intake 1510 ml   Output 950 ml   Net 560 ml     Constitutional: alert, appears stated age, and cooperative    Skin: Skin color, texture, turgor normal. No rashes or lesions    Head: Normocephalic, without obvious abnormality, atraumatic     Cardiovascular/Edema: regular rate and rhythm, S1, S2 normal, no murmur, click, rub or gallop    Respiratory: Diminished breath sounds with bilateral basal Rales    Abdomen: soft, non-tender; bowel sounds normal; no masses,  no organomegaly    Back: symmetric, no curvature. ROM normal. No CVA tenderness.     Extremities: + edema    Neuro:  Grossly normal    CBC:   Recent Labs     23  1027 23  0537 23  0613   WBC 8.5 6.9 7.2   HGB 11.7* 10.8* 10.8*    174 154     BMP:    Recent Labs     02/27/23  1027 02/28/23  0537 03/01/23  0613    144 138   K 4.3 3.6* 5.0    104 100   CO2 25 30 28   BUN 50* 49* 55*   CREATININE 2.39* 2.52* 2.66*   GLUCOSE 128* 73 355*       Lab Results   Component Value Date/Time    NITRU NEGATIVE 02/27/2023 03:47 PM    COLORU Yellow 02/27/2023 03:47 PM    PHUR 6.5 02/27/2023 03:47 PM    WBCUA 0 TO 2 02/27/2023 03:47 PM    RBCUA 0 TO 2 02/27/2023 03:47 PM    MUCUS 1+ 02/07/2023 03:00 PM    TRICHOMONAS NOT REPORTED 11/14/2021 02:05 AM    YEAST FEW 02/07/2023 03:00 PM    BACTERIA None 02/27/2023 03:47 PM    SPECGRAV 1.011 02/27/2023 03:47 PM    LEUKOCYTESUR NEGATIVE 02/27/2023 03:47 PM    UROBILINOGEN Normal 02/27/2023 03:47 PM    BILIRUBINUR NEGATIVE 02/27/2023 03:47 PM    GLUCOSEU NEGATIVE 02/27/2023 03:47 PM    KETUA NEGATIVE 02/27/2023 03:47 PM    AMORPHOUS 1+ 02/25/2023 03:15 AM     Urine Sodium:     Lab Results   Component Value Date/Time    JASON 47 11/14/2021 02:04 AM     Urine Chloride:    Lab Results   Component Value Date/Time    CLUR 26 11/14/2021 02:04 AM     Urine Protein:     Lab Results   Component Value Date/Time    TPU 20 11/12/2021 10:30 AM     Urine Creatinine:     Lab Results   Component Value Date/Time    LABCREA 65.4 01/26/2023 01:10 PM     IMPRESSION/RECOMMENDATIONS:      1. Chronic kidney disease stage IV [baseline serum creatinine 2 to 3 mg/dL] renal function is stable and CKD in this patient is consistent with diabetic and hypertensive nephropathy as well as cardiorenal syndrome. She has peripheral edema and has indications for continuation of diuretics. There are no indications for acute hemodialysis at this time. Plan: Continue Bumex 3 mg p.o. twice daily. Monitor urine output closely. Basic metabolic profile daily. 2.  Shortness of breath - multifactorial etiology including secondary to decompensated diastolic heart failure. Continue current diuretic regiment. Daily weights.  2 g sodium per day.     3.  Systemic hypertension - blood pressure control is adequate. Prognosis is guarded. Thank you very much for the courtesy and confidence of this consultation.     Asher Gallo MD FACP  Attending Nephrologist  3/1/2023 1:50 PM

## 2023-03-01 NOTE — CARE COORDINATION
Patient is on 6L of O2. [facility] can not accept patient with nasal canula that high. Monica Juárez will follow for updates and submit for authorization tomorrow if appropriate.     Electronically signed by AZ Gamez on 3/1/2023 at 3:23 PM

## 2023-03-01 NOTE — PROGRESS NOTES
[facility]  Family Medicine        Progress Note      Date:   3/1/2023  Patient name:  Taylor Cr  Date of admission:  2/27/2023  9:54 AM  MRN:   [id number]  YOB: 1960    SUBJECTIVE:     Patient was seen and examined at bedside. Pt continues to have dyspnea, cough. She quickly desaturates with movement. Sugars are trending up on steroids. Consultant notes, labs & imaging reviewed. Case was discussed with nursing staff. Brief HPI    Pt admitted for respiratory failure requiring higher oxygen than normal    Review of Systems   Constitutional:  Negative for chills, diaphoresis and fever. Respiratory:  Positive for cough and shortness of breath. Cardiovascular:  Negative for palpitations. Chest pain: Chest pressure substernal, chronic. Gastrointestinal:  Negative for abdominal pain, nausea and vomiting. Genitourinary:  Negative for difficulty urinating and dysuria. Neurological:  Negative for dizziness, light-headedness and headaches. OBJECTIVE:     /75   Pulse 72   Temp 98.2 °F (36.8 °C) (Oral)   Resp 16   Ht 5' 5\" (1.651 m)   Wt 243 lb 13.3 oz (110.6 kg)   SpO2 94%   BMI 40.58 kg/m²      Physical Exam  Vitals and nursing note reviewed. Eyes:      Extraocular Movements: Extraocular movements intact. Conjunctiva/sclera: Conjunctivae normal.   Cardiovascular:      Rate and Rhythm: Normal rate and regular rhythm. Pulses: Normal pulses. Heart sounds: Murmur heard. Pulmonary:      Breath sounds: No wheezing. Comments: Fine rales at bases  Abdominal:      General: There is no distension. Palpations: Abdomen is soft. Tenderness: There is no abdominal tenderness. There is no guarding. Musculoskeletal:      Right lower leg: Edema present. Left lower leg: Edema present. Neurological:      General: No focal deficit present. Mental Status: She is alert and oriented to person, place, and time. Intake/Output:    Intake/Output Summary (Last 24 hours) at 3/1/2023 1306  Last data filed at 3/1/2023 1243  Gross per 24 hour   Intake 1510 ml   Output 950 ml   Net 560 ml       Laboratory Testing:  CBC:   Recent Labs     03/01/23  0613   WBC 7.2   HGB 10.8*          BMP:    Recent Labs     02/27/23  1027 02/28/23  0537 03/01/23  0613    144 138   K 4.3 3.6* 5.0    104 100   CO2 25 30 28   BUN 50* 49* 55*   CREATININE 2.39* 2.52* 2.66*   GLUCOSE 128* 73 355*       Magnesium:   Lab Results   Component Value Date/Time    MG 2.2 02/12/2023 06:07 AM     Phosphorus:   Lab Results   Component Value Date/Time    PHOS 4.0 01/27/2023 03:09 AM     Ionized Calcium:   Lab Results   Component Value Date/Time    CAION 1.04 04/18/2022 12:27 PM      PT/INR:    Lab Results   Component Value Date/Time    PROTIME 10.7 01/26/2023 06:44 AM    PROTIME 16.4 08/27/2015 12:00 AM    PROTIME 16.4 08/27/2015 12:00 AM    INR 1.0 01/26/2023 06:44 AM     PTT:    Lab Results   Component Value Date/Time    APTT 27.5 11/10/2021 03:50 PM       ASSESSMENT/PLAN     Principal Problem:    Acute respiratory failure with hypoxia (HCC)  Active Problems:    Type 2 diabetes mellitus with hyperglycemia, with long-term current use of insulin (HCC)    Acute on chronic heart failure, unspecified heart failure type ([facility])    Diarrhea of presumed infectious origin    CKD (chronic kidney disease) stage 4, GFR 15-29 ml/min ([facility])  Resolved Problems:    * No resolved hospital problems. *      1. Solu-medrol 40 q12h  2.nephro on board, will continue Bumex 3 mg twice daily  4. Increase lantus to 60 BID, high dose scale while on steroid  5. Potassium 3.6, replaced  6. Continue amlodipine, coreg.   6. Cardio, pulm on board    Naomy Salgado MD   3/1/2023 1:06 PM

## 2023-03-01 NOTE — PROGRESS NOTES
02/28/23 1940   Encounter Summary   Encounter Overview/Reason   Encounter   Service Provided For: Patient   Referral/Consult From: Nhi   Last Encounter  02/28/23   Complexity of Encounter Low   Spiritual/Emotional needs   Type Spiritual Support   Rituals, Rites and Sacraments   Type Anointing  (Irlanda Goes 2/28/23)

## 2023-03-01 NOTE — PROGRESS NOTES
[facility]   Progress Note                   Date:   3/1/2023  Patient name: Jill Harrell  Date of admission:  2/27/2023  9:54 AM  MRN:   [id number]  YOB: 1960  PCP: AFSANEH Salazar CNP    Reason for Admission:     Subjective:       Clinical Changes / Abnormalities:  Pt still notes SOB at rest; denies CP or lightheadedness. SpO2 94% on 6L NC      Medications:   Scheduled Meds:   insulin lispro  0-16 Units SubCUTAneous TID WC    insulin glargine  60 Units SubCUTAneous BID    guaiFENesin  600 mg Oral BID    bumetanide  3 mg Oral BID    dextromethorphan  60 mg Oral 2 times per day    methylPREDNISolone  40 mg IntraVENous Q12H    insulin lispro  0-4 Units SubCUTAneous Nightly    sodium chloride flush  5-40 mL IntraVENous 2 times per day    heparin (porcine)  5,000 Units SubCUTAneous 3 times per day    allopurinol  100 mg Oral Daily    aspirin  81 mg Oral Daily    amLODIPine  5 mg Oral Daily    atorvastatin  80 mg Oral Nightly    carvedilol  6.25 mg Oral BID    gabapentin  300 mg Oral Daily    pantoprazole  40 mg Oral QAM AC    tamsulosin  0.4 mg Oral Daily    ticagrelor  90 mg Oral BID    ipratropium-albuterol  1 ampule Inhalation Q4H WA     Continuous Infusions:   sodium chloride      dextrose       CBC:   Recent Labs     02/27/23  1027 02/28/23  0537 03/01/23  0613   WBC 8.5 6.9 7.2   HGB 11.7* 10.8* 10.8*    174 154     BMP:    Recent Labs     02/27/23  1027 02/28/23  0537 03/01/23  0613    144 138   K 4.3 3.6* 5.0    104 100   CO2 25 30 28   BUN 50* 49* 55*   CREATININE 2.39* 2.52* 2.66*   GLUCOSE 128* 73 355*     Hepatic: No results for input(s): AST, ALT, ALB, BILITOT, ALKPHOS in the last 72 hours. Troponin: No results for input(s): TROPONINI in the last 72 hours. BNP: No results for input(s): BNP in the last 72 hours. Lipids: No results for input(s): CHOL, HDL in the last 72 hours.     Invalid input(s): LDLCALCU  INR: No results for input(s): INR in the last 72 hours. Objective:   Vitals: /75   Pulse 72   Temp 98.2 °F (36.8 °C) (Oral)   Resp 16   Ht 5' 5\" (1.651 m)   Wt 243 lb 13.3 oz (110.6 kg)   SpO2 93%   BMI 40.58 kg/m²   General appearance: alert and cooperative with exam  HEENT: Head: Normocephalic, no lesions, without obvious abnormality. Neck: no adenopathy, no carotid bruit, no JVD, supple, symmetrical, trachea midline and thyroid not enlarged, symmetric, no tenderness/mass/nodules  Lungs: Diminished BS with faint bibasilar rales  Heart: regular rate and rhythm, S1, S2 normal, no murmur, click, rub or gallop  Abdomen: soft, non-tender; bowel sounds normal; no masses,  no organomegaly  Extremities: extremities normal, atraumatic, no cyanosis; 2-3+ edema  Neurologic: Mental status: Alert, oriented, thought content appropriate      ECHO: 1/27/23  Summary  Global left ventricular systolic function is normal.  Estimated LVEF 50-55%. Mild concentric left ventricular hypertrophy. Normal right ventricular size and function. No significant valvular regurgitation or stenosis seen. No pericardial effusion seen. LEXISCAN STRESS TEST 2/13/23      Impression       Large infarct in the lateral wall extending into the cardiac apex with some   stalin-infarct ischemia in anterolateral wall. LVEF normal.       Risk stratification: High risk patient has no prior history of evidence of   MI. Low risk if there is known prior history. CARDIAC CATHETERIZATION ( 2/14/23)    Conclusions:     Multivessel coronary artery disease. Patent LIMA-LAD, SVG-D and SVG-OM1. Severe diffuse disease post anastomosis of SVG-OM is known  and not amenable to PCI. Patent proximal RCA stent with new mid severe stenosis. RPDA has diffuse severe stenosis but small for  PCI. Successful PTCA/FADIA of mid RCA. Findings:  Cardiac Arteries and Lesion Findings  LMCA: has 80% diffuse disease. LAD: has proximal 100% occlusion. LIMA-LAD is patent.  SVG-D is patent. LCx: has proximal 100% occlusion. SVG-OM 1 is patent with severe diffuse disease post anastomosis of  SVG-OM is known and not amenable to PCI. RCA: has proximal patent stent with mid 85% stenosis. RPDA has diffuse 80% stenosis but is a small  vessel. RPL is normal.  Lesion on Mid RCA: Mid subsection. 85% stenosis 20 mm length reduced to 0%. Pre procedure  ABEL III flow was noted. Post Procedure ABEL III flow was present. Good runoff was present. The lesion  was diagnosed as High Risk (C). The lesion was discrete and eccentric. The lesion showed with  irregular contour, mild angulation and mild tortuosity. Devices used  Capital One Flex 180 cm. Number of passes: 1.   [place] Balloon 2.5mm x 15mm. 3 inflation(s) to a max pressure of: 20 jayne.  [place] Tripp 2.75x22. 1 inflation(s) to a max pressure of: 20 jayne.  [place] Euphora Balloon 3.0mm x 20mm.  2 inflation(s) to a max pressure of: 20 jayne     Dominance: Right     ssessment / Acute Cardiac Problems:   Acute-on-chronic HFpEF  Chronic hypoxic respiratory failure  CAD with hx of CABG and recent PCI with FADIA to mid-RCA on 2/14/23  Chronic troponin elevation, stable and c/w reduced renal clearance; no evidence of acute coronary syndrome  CKD Stage IV with baseline creat 2.6-3  HTN  Hx of frequent/recurrent UTI  ERICK  Morbid obesity with BMI > 40  Hx of DVT  Chronic lymphedema    Patient Active Problem List:     Atherosclerosis of coronary artery bypass graft of native heart without angina pectoris     Acute kidney injury superimposed on CKD (HCC)     Acute on chronic diastolic heart failure (HCC)     Diabetic polyneuropathy associated with type 2 diabetes mellitus ([facility] [address]     History of coronary artery bypass graft     Iron deficiency anemia     Spinal stenosis of lumbar region with neurogenic claudication     Mixed hyperlipidemia     CKD (chronic kidney disease) stage 4, GFR 15-29 ml/min ([facility])     Type 2 diabetes 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, with long-term current use of insulin (HCC)     Obesity, Class II, BMI 35-39.9     Thyroid nodule greater than or equal to 1 cm in diameter incidentally noted on imaging study     Hypertension, essential     Chronic ischemic heart disease     Ischemic stroke of frontal lobe (HCC)     Morbid obesity with BMI of 40.0-44.9, adult (HCC)     Disequilibrium syndrome     Anxiety     Chronic midline low back pain with bilateral sciatica     COVID     Cerebral hypoperfusion     Immature arteriovenous fistula (HCC)     History of fusion of cervical spine     Depression with anxiety     Vancomycin resistant Enterococcus UTI     Dialysis disequilibrium syndrome     Acute cystitis without hematuria     VRE infection (vancomycin resistant Enterococcus)     Infection due to ESBL-producing Klebsiella pneumoniae     Class 2 severe obesity due to excess calories with serious comorbidity and body mass index (BMI) of 35.0 to 35.9 in adult [facility])     Type 2 diabetes mellitus with hyperglycemia, with long-term current use of insulin (HCC)     Right hemiparesis (HCC)     Ulcer of left foot, limited to breakdown of skin ([address]     Secondary hyperparathyroidism ([address]     Hypertensive urgency     Hypoxia     Congestive heart failure ([address]     Nephrotic range proteinuria     Acute respiratory failure with hypoxia (HCC)     Syncope and collapse     Subacute cough     Acute on chronic heart failure, unspecified heart failure type (HCC)     Diarrhea of presumed infectious origin      Plan of Treatment:   Continue Bumex 3 mg po BID per Renal Service  Continue amlodipine and carvedilol  Continue ASA and Brilinta  Continuing IV Solumedrol and nebulizers.     Electronically signed by Cameron Roa MD on 3/1/2023 at 6:06 PM  [facility]  [phone]

## 2023-03-01 NOTE — PROGRESS NOTES
Physical Therapy  Facility/Department: [facility] PROGRESSIVE CARE  Daily Treatment Note  NAME: Aleksandar Hoang  : 1960  MRN: [id number]    Date of Service: 3/1/2023    Discharge Recommendations:  Patient would benefit from continued therapy after discharge        Patient Diagnosis(es): The encounter diagnosis was Congestive heart failure, unspecified HF chronicity, unspecified heart failure type ([facility] [address] Assessment   Activity Tolerance: Treatment limited secondary to medical complications; Patient limited by endurance     Plan    Physcial Therapy Plan  General Plan: 5-7 times per week  Specific Instructions for Next Treatment: Bed mobility, Supine/seated Exercise; Diaphragmatic breathing exercises  Current Treatment Recommendations: Strengthening;Balance training;Functional mobility training;Transfer training; Endurance training;Gait training;Patient/Caregiver education & training; Safety education & training;Equipment evaluation, education, & procurement; Therapeutic activities     Restrictions  Restrictions/Precautions  Restrictions/Precautions: Fall Risk, General Precautions  Required Braces or Orthoses?: Yes (Lymphedema)     Subjective    Subjective  Subjective: Patient sitting up in bed upon arrival; DEDE Rodriguez approved PT. Patient reports discomfort with coughing and difficulty speaking when SpO2 levels drop. Pain: Patient denies pain but does state chest discomfort from frequent coughing. Orientation  Overall Orientation Status: Within Functional Limits  Orientation Level: Oriented X4     Objective   Vitals  O2 Device: Nasal cannula  Bed Mobility Training  Bed Mobility Training: Yes  Overall Level of Assistance: Contact-guard assistance; Additional time  Interventions: Safety awareness training;Verbal cues; Weight shifting training/pressure relief  Scooting: Contact-guard assistance (scooting left<>right in long sitting for repositioning and pressure relief, SpO2 decreased from 90% on 5Lt > 88% on 5Lt with mvoement.)  Balance  Sitting: Without support  Standing: With support  Transfer Training  Transfer Training: No (patient declined transfer training this date 2* unstable O2 levels)  Gait Training  Gait Training: No  Wheelchair Management  Wheelchair Management: No    PT Exercises  Exercise Treatment: repositioning, breathing ex/education, and ther ex  Circulation/Endurance Exercises: ankle pumps, quad sets, glut sets x20 reps  Pressure Relief Exercises: weight shifting L<>R  Breathing Techniques: diaphragmatic breathing and pursed lip breathing techniques  Disease-specific Exercises: education on spirometer use (increased time and effort to complete, pt breathing through nose during task vs mouth; would benefit from more education. )    AM-PAC Basic Mobility - Inpatient   How much help is needed turning from your back to your side while in a flat bed without using bedrails?: A Little  How much help is needed moving from lying on your back to sitting on the side of a flat bed without using bedrails?: Total  How much help is needed moving to and from a bed to a chair?: A Lot  How much help is needed standing up from a chair using your arms?: A Lot  How much help is needed walking in hospital room?: A Lot  How much help is needed climbing 3-5 steps with a railing?: A Lot  AM-PAC Inpatient Mobility Raw Score : 12  AM-PAC Inpatient T-Scale Score : 35.33  Mobility Inpatient CMS 0-100% Score: 68.66  Mobility Inpatient CMS G-Code Modifier : CL     Goals  Short Term Goals  Time Frame for Short Term Goals: 8 visits  Short Term Goal 1: pt to demo functional bed mobility with Jose  Short Term Goal 2: pt to tolerate 30 minutes of therapeutic activity and exercise while maintaining SpO2 of 90% or greater  Short Term Goal 3: Pt to demo good technique for Core and BLE strengthening exercises  Short Term Goal 4: PT to assess OOB transfers, gait, and stair climbing when medically appropriate    Education  Patient Education  Education Given To: Patient  Education Provided: Home Exercise Program;Energy Conservation;Fall Prevention Strategies  Education Provided Comments: Breathing techniques  Education Method: Demonstration;Verbal  Barriers to Learning: None  Education Outcome: Verbalized understanding;Demonstrated understanding;Continued education needed    Therapy Time   Individual Concurrent Group Co-treatment   Time In 1115         Time Out 1143         Minutes 28                 Sharifa Sethi, NATALIYA

## 2023-03-01 NOTE — PLAN OF CARE
Problem: Discharge Planning  Goal: Discharge to home or other facility with appropriate resources  3/1/2023 1653 by Adriana Hutchinson RN  Outcome: Progressing  Flowsheets (Taken 3/1/2023 8709)  Discharge to home or other facility with appropriate resources: Identify barriers to discharge with patient and caregiver     Problem: Safety - Adult  Goal: Free from fall injury  3/1/2023 1653 by Adriana Hutchinson RN  Outcome: Progressing     Problem: ABCDS Injury Assessment  Goal: Absence of physical injury  3/1/2023 1653 by Adriana Hutchinson RN  Outcome: Progressing  Flowsheets (Taken 3/1/2023 0846)  Absence of Physical Injury: Implement safety measures based on patient assessment     Problem: Chronic Conditions and Co-morbidities  Goal: Patient's chronic conditions and co-morbidity symptoms are monitored and maintained or improved  3/1/2023 1653 by Adriana Hutchinson RN  Outcome: Progressing  Flowsheets (Taken 3/1/2023 0841)  Care Plan - Patient's Chronic Conditions and Co-Morbidity Symptoms are Monitored and Maintained or Improved: Monitor and assess patient's chronic conditions and comorbid symptoms for stability, deterioration, or improvement     Problem: Skin/Tissue Integrity  Goal: Absence of new skin breakdown  Description: 1. Monitor for areas of redness and/or skin breakdown  2. Assess vascular access sites hourly  3. Every 4-6 hours minimum:  Change oxygen saturation probe site  4. Every 4-6 hours:  If on nasal continuous positive airway pressure, respiratory therapy assess nares and determine need for appliance change or resting period.   3/1/2023 1653 by Adriana Hutchinson RN  Outcome: Progressing

## 2023-03-01 NOTE — PLAN OF CARE
Problem: Discharge Planning  Goal: Discharge to home or other facility with appropriate resources  3/1/2023 0718 by Danny Da Silva RN  Outcome: Progressing     Problem: Safety - Adult  Goal: Free from fall injury  3/1/2023 0718 by Danny Da Silva RN  Outcome: Progressing     Problem: ABCDS Injury Assessment  Goal: Absence of physical injury  3/1/2023 0718 by Danny Da Silva RN  Outcome: Progressing     Problem: Chronic Conditions and Co-morbidities  Goal: Patient's chronic conditions and co-morbidity symptoms are monitored and maintained or improved  3/1/2023 0718 by Danny Da Silva RN  Outcome: Progressing     Problem: Skin/Tissue Integrity  Goal: Absence of new skin breakdown  Description: 1. Monitor for areas of redness and/or skin breakdown  2. Assess vascular access sites hourly  3. Every 4-6 hours minimum:  Change oxygen saturation probe site  4. Every 4-6 hours:  If on nasal continuous positive airway pressure, respiratory therapy assess nares and determine need for appliance change or resting period.   Outcome: Progressing

## 2023-03-01 NOTE — PROGRESS NOTES
[facility]  Family Medicine        Progress Note      Date:   2/28/2023  Patient name:  Estefany Garcia  Date of admission:  2/27/2023  9:54 AM  MRN:   [id number]  YOB: 1960    SUBJECTIVE:     Patient was seen and examined at bedside. Pt continues to have dyspnea, cough. She quickly desaturates with movement.  Consultant notes, labs & imaging reviewed. Case was discussed with nursing staff.       Brief HPI    Pt admitted for respiratory failure requiring higher oxygen than normal    Review of Systems   Constitutional:  Negative for chills, diaphoresis and fever.   Respiratory:  Positive for cough and shortness of breath.    Cardiovascular:  Positive for chest pain. Negative for palpitations.   Gastrointestinal:  Negative for abdominal pain, nausea and vomiting.   Genitourinary:  Negative for difficulty urinating and dysuria.   Neurological:  Negative for dizziness, light-headedness and headaches.       OBJECTIVE:     BP (!) 131/54   Pulse 74   Temp 99.1 °F (37.3 °C) (Oral)   Resp 18   Ht 5' 5\" (1.651 m)   Wt 247 lb 9.2 oz (112.3 kg)   SpO2 (!) 86%   BMI 41.20 kg/m²      Physical Exam  Vitals and nursing note reviewed.   Eyes:      Extraocular Movements: Extraocular movements intact.      Conjunctiva/sclera: Conjunctivae normal.   Cardiovascular:      Rate and Rhythm: Normal rate and regular rhythm.      Pulses: Normal pulses.      Heart sounds: Murmur heard.   Pulmonary:      Breath sounds: No wheezing.      Comments: Fine rales at bases  Abdominal:      General: There is no distension.      Palpations: Abdomen is soft.      Tenderness: There is no abdominal tenderness. There is no guarding.   Musculoskeletal:      Right lower leg: Edema present.      Left lower leg: Edema present.   Neurological:      General: No focal deficit present.      Mental Status: She is alert and oriented to person, place, and time.        Intake/Output:    Intake/Output Summary (Last 24 hours)  at 2/28/2023 2215  Last data filed at 2/28/2023 1808  Gross per 24 hour   Intake 1190 ml   Output 1075 ml   Net 115 ml     Laboratory Testing:  CBC:   Recent Labs     02/28/23  0537   WBC 6.9   HGB 10.8*        BMP:    Recent Labs     02/27/23  1027 02/28/23  0537    144   K 4.3 3.6*    104   CO2 25 30   BUN 50* 49*   CREATININE 2.39* 2.52*   GLUCOSE 128* 73     Magnesium:   Lab Results   Component Value Date/Time    MG 2.2 02/12/2023 06:07 AM     Phosphorus:   Lab Results   Component Value Date/Time    PHOS 4.0 01/27/2023 03:09 AM     Ionized Calcium:   Lab Results   Component Value Date/Time    CAION 1.04 04/18/2022 12:27 PM      PT/INR:    Lab Results   Component Value Date/Time    PROTIME 10.7 01/26/2023 06:44 AM    PROTIME 16.4 08/27/2015 12:00 AM    PROTIME 16.4 08/27/2015 12:00 AM    INR 1.0 01/26/2023 06:44 AM     PTT:    Lab Results   Component Value Date/Time    APTT 27.5 11/10/2021 03:50 PM       ASSESSMENT/PLAN     Principal Problem:    Acute respiratory failure with hypoxia (HCC)  Active Problems:    Type 2 diabetes mellitus with hyperglycemia, with long-term current use of insulin (HCC)    Acute on chronic heart failure, unspecified heart failure type ([facility])    Diarrhea of presumed infectious origin    CKD (chronic kidney disease) stage 4, GFR 15-29 ml/min ([facility])  Resolved Problems:    * No resolved hospital problems. *      1. Solu-medrol 40 q12h  2. Pt has pleural effusion however pulm is concerned that tap can not be done due to brilinta, will need diuresis. Pts renal function is compromised, but still near baseline  3. Consult placed for nephro  4. Increase lantus to 50 BID, high dose scale while on steroid  5. Potassium 3.6, replaced  6. Continue amlodipine, coreg. Bumex was restarted by cardio.    6. Cardio, pulm on board    Nicolas Guadalupe MD   2/28/2023 10:15 PM [facility]

## 2023-03-01 NOTE — DISCHARGE INSTR - COC
Continuity of Care Form    Patient Name: Estefany Garcia   :  1960  MRN:  [id number]    Admit date:  2023  Discharge date:  2023    Code Status Order: Full Code   Advance Directives:     Admitting Physician:  Priscila Rob MD  PCP: Zulma Payne, APRN - CNP    Discharging Nurse: Sabra GUPTA RN  Discharging Hospital Unit/Room#: /-  Discharging Unit Phone Number: [phone]    Emergency Contact:   Extended Emergency Contact Information  Primary Emergency Contact: Hailey Guido  Home Phone: [phone]  Relation: Brother/Sister  Secondary Emergency Contact: Nancy Perdomo  Home Phone: [phone]  Relation: Brother/Sister    Past Surgical History:  Past Surgical History:   Procedure Laterality Date    ANKLE FRACTURE SURGERY      AV FISTULA CREATION  2021    BACK SURGERY      BREAST SURGERY      CARDIAC CATHETERIZATION  2023    Dr. Beard    CARDIAC SURGERY      CARPAL TUNNEL RELEASE      CHOLECYSTECTOMY, OPEN N/A     COLONOSCOPY      CORONARY ARTERY BYPASS GRAFT      x3    DIALYSIS FISTULA CREATION Left 2021    LEFT AV FISTULA CREATION UPPER EXTREMITY performed by Jairo Singh MD at [facility]    EYE SURGERY      FRACTURE SURGERY      HAND SURGERY      IR TUNNELED CATHETER PLACEMENT GREATER THAN 5 YEARS  2021    IR TUNNELED CATHETER PLACEMENT GREATER THAN 5 YEARS 2021 [facility] SPECIAL PROCEDURES    KNEE ARTHROSCOPY      right    OTHER SURGICAL HISTORY  2022    cvc exchange    TONSILLECTOMY         Immunization History:   Immunization History   Administered Date(s) Administered    COVID-19, MODERNA Booster BLUE border, (age 18y+), IM, 50mcg/0.25mL 2022    COVID-19, PFIZER PURPLE top, DILUTE for use, (age 12 y+), 30mcg/0.3mL 2021, 2021    Influenza Virus Vaccine 10/18/2018, 09/10/2019, 2020    Influenza, AFLURIA (age 3 yrs+), FLUZONE, (age 6 mo+), MDV, 0.5mL 10/16/2017    Influenza, FLUARIX, FLULAVAL, FLUZONE (age 6 mo+) AND  AFLURIA, (age 1 y+), PF, 0.5mL 10/18/2018, 09/10/2019, 09/20/2019, 11/03/2020    Influenza, FLUCELVAX, (age 10 mo+), MDCK, PF, 0.5mL 09/30/2021    Pneumococcal Conjugate 13-valent ([id number]) 08/06/2019    Pneumococcal Polysaccharide ([id number]) 10/30/2014    Tdap (Boostrix, Adacel) 11/18/2017    Zoster Recombinant (Shingrix) 09/23/2022, 01/10/2023       Active Problems:  Patient Active Problem List   Diagnosis Code    Atherosclerosis of coronary artery bypass graft of native heart without angina pectoris I25.810    Acute kidney injury superimposed on CKD ([facility]) N17.9, N18.9    Acute on chronic diastolic heart failure ([facility]) I50.33    Diabetic polyneuropathy associated with type 2 diabetes mellitus ([facility]) E11.42    History of coronary artery bypass graft Z95.1    Iron deficiency anemia D50.9    Spinal stenosis of lumbar region with neurogenic claudication M48.062    Mixed hyperlipidemia E78.2    CKD (chronic kidney disease) stage 4, GFR 15-29 ml/min ([facility]) N18.4    Type 2 diabetes 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, with long-term current use of insulin ([facility]) E11.22, N18.6, Z99.2, Z79.4    Obesity, Class II, BMI 35-39.9 E66.9    Thyroid nodule greater than or equal to 1 cm in diameter incidentally noted on imaging study E04.1    Hypertension, essential I10    Chronic ischemic heart disease I25.9    Ischemic stroke of frontal lobe ([facility]) I63.9    Morbid obesity with BMI of 40.0-44.9, adult ([facility]) E66.01, Z68.41    Disequilibrium syndrome E87.8    Anxiety F41.9    Chronic midline low back pain with bilateral sciatica M54.41, M54.42, G89.29    COVID U07.1    Cerebral hypoperfusion I67.9    Immature arteriovenous fistula ([facility]) I77.0    History of fusion of cervical spine Z98.1    Depression with anxiety F41.8    Vancomycin resistant Enterococcus UTI A49.1, Z16.21    Dialysis disequilibrium syndrome E87.8    Acute cystitis without hematuria N30.00    VRE infection (vancomycin resistant Enterococcus) A49.1, Z16.21    Infection due to ESBL-producing Klebsiella pneumoniae A49.8, Z16.12    Class 2 severe obesity due to excess calories with serious comorbidity and body mass index (BMI) of 35.0 to 35.9 in adult [facility]) E66.01, Z68.35    Type 2 diabetes mellitus with hyperglycemia, with long-term current use of insulin (HCC) E11.65, Z79.4    Right hemiparesis ([facility]) G81.91    Ulcer of left foot, limited to breakdown of skin ([address] L97.521    Secondary hyperparathyroidism ([address] N25.81    Hypertensive urgency I16.0    Hypoxia R09.02    Congestive heart failure ([facility]) I50.9    Nephrotic range proteinuria R80.9    Acute respiratory failure with hypoxia ([facility]) J96.01    Syncope and collapse R55    Subacute cough R05.2    Acute on chronic heart failure, unspecified heart failure type ([facility]) I50.9    Diarrhea of presumed infectious origin R19.7       Isolation/Infection:   Isolation            C Diff Contact  Contact  Contact          Patient Infection Status       Infection Onset Added Last Indicated Last Indicated By Review Planned Expiration Resolved Resolved By    C-diff Rule Out 02/27/23 02/27/23 02/27/23 C DIFF TOXIN/ANTIGEN (Ordered) 03/06/23 03/09/23      ESBL (Extended Spectrum Beta Lactamase) 08/02/22 08/05/22 08/02/22 Culture, Urine        MDRO (multi-drug resistant organism) 08/02/22 08/05/22 08/02/22 Culture, Urine        VRE 08/02/22 08/04/22 02/25/23 Culture, Urine        Urine- 8/22      ESBL (Extended Spectrum Beta Lactamase) 07/02/22 07/04/22 08/02/22 Culture, Urine        MDRO (multi-drug resistant organism) 07/02/22 07/04/22 08/02/22 Culture, Urine        VRE 05/09/22 05/13/22 02/25/23 Culture, Urine        MRSA 08/03/20 08/13/21 08/13/21 Jamila Dixon RN        Added from external infection.     Resolved    COVID-19 (Rule Out) 02/28/23 02/28/23 02/28/23 Respiratory Panel, Molecular, with COVID-19 (Restricted: peds pts or suitable admitted adults) (Ordered)   02/28/23 Rule-Out Test Resulted    COVID-19 (Rule Out) 23 COVID-19 & Influenza Combo (Ordered)   23 Rule-Out Test Resulted    COVID-19 (Rule Out) 22 COVID-19, Rapid (Ordered)   22 Rule-Out Test Resulted    COVID-19 (Rule Out) 22 COVID-19, Rapid (Ordered)   22 Rule-Out Test Resulted    COVID-19 (Rule Out) 22 COVID-19 & Influenza Combo (Ordered)   22 Rule-Out Test Resulted    COVID-19 (Rule Out) 22 COVID-19 & Influenza Combo (Ordered)   04/15/22 Raya Vega RN    COVID-19 (Rule Out) 22 COVID-19 & Influenza Combo (Ordered)   22 Rule-Out Test Resulted    COVID-19 (Rule Out) 22 COVID-19 (Ordered)   22 Rule-Out Test Resulted    COVID-19 22 COVID-19   22     COVID-19 (Rule Out) 22 COVID-19 (Ordered)   22 Rule-Out Test Resulted            Nurse Assessment:  Last Vital Signs: /71   Pulse 69   Temp 98.3 °F (36.8 °C) (Oral)   Resp 18   Ht 5' 5\" (1.651 m)   Wt 243 lb 13.3 oz (110.6 kg)   SpO2 91%   BMI 40.58 kg/m²     Last documented pain score (0-10 scale): Pain Level: 3  Last Weight:   Wt Readings from Last 1 Encounters:   23 243 lb 13.3 oz (110.6 kg)     Mental Status:  oriented and alert    IV Access:  - None    Nursing Mobility/ADLs:  Walking   Assisted  Transfer  Assisted  Bathing  Assisted  Dressing  Assisted  Toileting  Assisted  Feeding  Independent  Med Admin  Independent  Med Delivery   whole    Wound Care Documentation and Therapy:        Elimination:  Continence:    Bowel: Yes  Bladder: Yes  Urinary Catheter: None   Colostomy/Ileostomy/Ileal Conduit: No       Date of Last BM: 2023    Intake/Output Summary (Last 24 hours) at 3/1/2023 0847  Last data filed at 3/1/2023 0403  Gross per 24 hour   Intake 1030 ml   Output 1350 ml   Net -320 ml     I/O last 3 completed shifts: In: 2670 [P.O.:2670]  Out: 3508 [Urine:1725]    Safety Concerns:     None    Impairments/Disabilities:      None    Nutrition Therapy:  Current Nutrition Therapy:   - Oral Diet:  Carb Control 4 carbs/meal (1800kcals/day) and Low Sodium (3-4gm)    Routes of Feeding: Oral  Liquids: No Restrictions  Daily Fluid Restriction: yes - amount 1500  Last Modified Barium Swallow with Video (Video Swallowing Test): not done    Treatments at the Time of Hospital Discharge:   Respiratory Treatments: See [facility]  Oxygen Therapy:  is on oxygen at 3-4 L/min per nasal cannula. Ventilator:    - No ventilator support    Rehab Therapies: Physical Therapy and Occupational Therapy  Weight Bearing Status/Restrictions: No weight bearing restrictions  Other Medical Equipment (for information only, NOT a DME order):  wheelchair and walker  Other Treatments: Skilled Nursing assessment and monitoring. Medication education and monitoring per protocol. Patient's personal belongings (please select all that are sent with patient):  Rose Marie    RN SIGNATURE:  Electronically signed by Bigg Multani RN on 3/8/23 at 11:37 AM EST    CASE MANAGEMENT/SOCIAL WORK SECTION    Inpatient Status Date: 2/27/23    Readmission Risk Assessment Score:  Readmission Risk              Risk of Unplanned Readmission:  47           Discharging to Facility/ Agency   [facility] [address] [place] [address] [place].    Phone: [phone]  Fax: [phone]     Dialysis Facility (if applicable)   Name:  Address:  Dialysis Schedule:  Phone:  Fax:    / signature: Electronically signed by Tien Banks RN on 3/1/23 at 8:47 AM EST    PHYSICIAN SECTION    Prognosis: Guarded    Condition at Discharge: Stable    Rehab Potential (if transferring to Rehab): Guarded    Recommended Labs or Other Treatments After Discharge: dialysis    Physician Certification: I certify the above information and transfer of Lisa Huber  is necessary for the continuing treatment of the diagnosis listed and that she requires [place] for greater 30 days.      Update Admission H&P: No change in H&P    PHYSICIAN SIGNATURE:  Electronically signed by Em Steele MD on 3/6/23 at 10:19 PM EST

## 2023-03-01 NOTE — PROGRESS NOTES
03/01/23 1743   Encounter Summary   Encounter Overview/Reason  Volunteer Encounter   Service Provided For: Patient   Referral/Consult From: Nhi   Last Encounter  03/01/23  (V)   Complexity of Encounter Low   Spiritual/Emotional needs   Type Spiritual Support   Rituals, Rites and Sacraments   Type Faith Communion   Assessment/Intervention/Outcome   Intervention Prayer (assurance of)/[place]

## 2023-03-01 NOTE — FLOWSHEET NOTE
03/01/23 0436   Treatment Team Notification   Reason for Communication Evaluate   Type of Critical Result POC test   Critical POC Result Type Glucose   Team Member Name Dr. Radha Garcia Team Role Attending Provider   Method of Communication Secure Message   Response See orders   Notification Time 0411     Writer informed Dr. Shaq Mcdonough of blood sugar 350. Orders received for high dose sliding scale and increase Lantus to 55 units.

## 2023-03-02 LAB
ABSOLUTE EOS #: 0 K/UL (ref 0–0.4)
ABSOLUTE LYMPH #: 0.51 K/UL (ref 1–4.8)
ABSOLUTE MONO #: 0.25 K/UL (ref 0.1–1.3)
ANION GAP SERPL CALCULATED.3IONS-SCNC: 11 MMOL/L (ref 9–17)
BASOPHILS # BLD: 0 % (ref 0–2)
BASOPHILS ABSOLUTE: 0 K/UL (ref 0–0.2)
BUN SERPL-MCNC: 65 MG/DL (ref 8–23)
CALCIUM SERPL-MCNC: 9.3 MG/DL (ref 8.6–10.4)
CHLORIDE SERPL-SCNC: 101 MMOL/L (ref 98–107)
CO2 SERPL-SCNC: 28 MMOL/L (ref 20–31)
CREAT SERPL-MCNC: 3 MG/DL (ref 0.5–0.9)
EOSINOPHILS RELATIVE PERCENT: 0 % (ref 0–4)
GFR SERPL CREATININE-BSD FRML MDRD: 17 ML/MIN/1.73M2
GLUCOSE BLD-MCNC: 282 MG/DL (ref 65–105)
GLUCOSE BLD-MCNC: 296 MG/DL (ref 65–105)
GLUCOSE BLD-MCNC: 342 MG/DL (ref 65–105)
GLUCOSE BLD-MCNC: 377 MG/DL (ref 65–105)
GLUCOSE BLD-MCNC: 393 MG/DL (ref 65–105)
GLUCOSE BLD-MCNC: 414 MG/DL (ref 65–105)
GLUCOSE SERPL-MCNC: 340 MG/DL (ref 70–99)
HBV CORE IGM SER QL: NONREACTIVE
HBV SURFACE AG SER QL: NONREACTIVE
HCT VFR BLD AUTO: 34.5 % (ref 36–46)
HCV AB SER QL: NONREACTIVE
HGB BLD-MCNC: 11.3 G/DL (ref 12–16)
LYMPHOCYTES # BLD: 4 % (ref 24–44)
MCH RBC QN AUTO: 31.4 PG (ref 26–34)
MCHC RBC AUTO-ENTMCNC: 32.7 G/DL (ref 31–37)
MCV RBC AUTO: 96.1 FL (ref 80–100)
MONOCYTES # BLD: 2 % (ref 1–7)
MORPHOLOGY: ABNORMAL
PDW BLD-RTO: 14.8 % (ref 11.5–14.9)
PLATELET # BLD AUTO: 175 K/UL (ref 150–450)
PMV BLD AUTO: 8.9 FL (ref 6–12)
POTASSIUM SERPL-SCNC: 5.3 MMOL/L (ref 3.7–5.3)
RBC # BLD: 3.59 M/UL (ref 4–5.2)
SEG NEUTROPHILS: 94 % (ref 36–66)
SEGMENTED NEUTROPHILS ABSOLUTE COUNT: 11.94 K/UL (ref 1.3–9.1)
SODIUM SERPL-SCNC: 140 MMOL/L (ref 135–144)
WBC # BLD AUTO: 12.7 K/UL (ref 3.5–11)

## 2023-03-02 PROCEDURE — 85025 COMPLETE CBC W/AUTO DIFF WBC: CPT

## 2023-03-02 PROCEDURE — 6360000002 HC RX W HCPCS: Performed by: STUDENT IN AN ORGANIZED HEALTH CARE EDUCATION/TRAINING PROGRAM

## 2023-03-02 PROCEDURE — 6370000000 HC RX 637 (ALT 250 FOR IP): Performed by: STUDENT IN AN ORGANIZED HEALTH CARE EDUCATION/TRAINING PROGRAM

## 2023-03-02 PROCEDURE — 2060000000 HC ICU INTERMEDIATE R&B

## 2023-03-02 PROCEDURE — 6370000000 HC RX 637 (ALT 250 FOR IP): Performed by: NURSE PRACTITIONER

## 2023-03-02 PROCEDURE — 99233 SBSQ HOSP IP/OBS HIGH 50: CPT | Performed by: STUDENT IN AN ORGANIZED HEALTH CARE EDUCATION/TRAINING PROGRAM

## 2023-03-02 PROCEDURE — 86803 HEPATITIS C AB TEST: CPT

## 2023-03-02 PROCEDURE — 82947 ASSAY GLUCOSE BLOOD QUANT: CPT

## 2023-03-02 PROCEDURE — 6360000002 HC RX W HCPCS: Performed by: NURSE PRACTITIONER

## 2023-03-02 PROCEDURE — 86705 HEP B CORE ANTIBODY IGM: CPT

## 2023-03-02 PROCEDURE — 80048 BASIC METABOLIC PNL TOTAL CA: CPT

## 2023-03-02 PROCEDURE — 36415 COLL VENOUS BLD VENIPUNCTURE: CPT

## 2023-03-02 PROCEDURE — 87340 HEPATITIS B SURFACE AG IA: CPT

## 2023-03-02 PROCEDURE — 2580000003 HC RX 258: Performed by: STUDENT IN AN ORGANIZED HEALTH CARE EDUCATION/TRAINING PROGRAM

## 2023-03-02 RX ORDER — INSULIN LISPRO 100 [IU]/ML
0-16 INJECTION, SOLUTION INTRAVENOUS; SUBCUTANEOUS
Status: DISCONTINUED | OUTPATIENT
Start: 2023-03-02 | End: 2023-03-08 | Stop reason: HOSPADM

## 2023-03-02 RX ORDER — INSULIN GLARGINE 100 [IU]/ML
55 INJECTION, SOLUTION SUBCUTANEOUS 2 TIMES DAILY
Status: DISCONTINUED | OUTPATIENT
Start: 2023-03-02 | End: 2023-03-06

## 2023-03-02 RX ADMIN — HEPARIN SODIUM 5000 UNITS: 5000 INJECTION INTRAVENOUS; SUBCUTANEOUS at 06:34

## 2023-03-02 RX ADMIN — ASPIRIN 81 MG: 81 TABLET, CHEWABLE ORAL at 10:51

## 2023-03-02 RX ADMIN — INSULIN LISPRO 12 UNITS: 100 INJECTION, SOLUTION INTRAVENOUS; SUBCUTANEOUS at 02:45

## 2023-03-02 RX ADMIN — INSULIN LISPRO 16 UNITS: 100 INJECTION, SOLUTION INTRAVENOUS; SUBCUTANEOUS at 16:37

## 2023-03-02 RX ADMIN — INSULIN LISPRO 8 UNITS: 100 INJECTION, SOLUTION INTRAVENOUS; SUBCUTANEOUS at 10:52

## 2023-03-02 RX ADMIN — GUAIFENESIN 600 MG: 600 TABLET, EXTENDED RELEASE ORAL at 20:54

## 2023-03-02 RX ADMIN — GUAIFENESIN 600 MG: 600 TABLET, EXTENDED RELEASE ORAL at 08:39

## 2023-03-02 RX ADMIN — ACETAMINOPHEN 650 MG: 325 TABLET ORAL at 08:48

## 2023-03-02 RX ADMIN — Medication 60 MG: at 08:48

## 2023-03-02 RX ADMIN — GABAPENTIN 300 MG: 300 CAPSULE ORAL at 08:39

## 2023-03-02 RX ADMIN — ALLOPURINOL 100 MG: 100 TABLET ORAL at 08:39

## 2023-03-02 RX ADMIN — INSULIN LISPRO 12 UNITS: 100 INJECTION, SOLUTION INTRAVENOUS; SUBCUTANEOUS at 06:34

## 2023-03-02 RX ADMIN — SODIUM CHLORIDE, PRESERVATIVE FREE 10 ML: 5 INJECTION INTRAVENOUS at 20:56

## 2023-03-02 RX ADMIN — METHYLPREDNISOLONE SODIUM SUCCINATE 40 MG: 40 INJECTION, POWDER, FOR SOLUTION INTRAMUSCULAR; INTRAVENOUS at 07:04

## 2023-03-02 RX ADMIN — CARVEDILOL 6.25 MG: 6.25 TABLET, FILM COATED ORAL at 20:54

## 2023-03-02 RX ADMIN — PANTOPRAZOLE SODIUM 40 MG: 40 TABLET, DELAYED RELEASE ORAL at 06:34

## 2023-03-02 RX ADMIN — Medication 3 MG: at 22:10

## 2023-03-02 RX ADMIN — HEPARIN SODIUM 5000 UNITS: 5000 INJECTION INTRAVENOUS; SUBCUTANEOUS at 22:10

## 2023-03-02 RX ADMIN — TAMSULOSIN HYDROCHLORIDE 0.4 MG: 0.4 CAPSULE ORAL at 10:51

## 2023-03-02 RX ADMIN — BUMETANIDE 3 MG: 1 TABLET ORAL at 16:36

## 2023-03-02 RX ADMIN — Medication 60 MG: at 20:54

## 2023-03-02 RX ADMIN — SODIUM CHLORIDE, PRESERVATIVE FREE 10 ML: 5 INJECTION INTRAVENOUS at 08:59

## 2023-03-02 RX ADMIN — TICAGRELOR 90 MG: 90 TABLET ORAL at 20:54

## 2023-03-02 RX ADMIN — INSULIN GLARGINE 55 UNITS: 100 INJECTION, SOLUTION SUBCUTANEOUS at 20:56

## 2023-03-02 RX ADMIN — INSULIN GLARGINE 60 UNITS: 100 INJECTION, SOLUTION SUBCUTANEOUS at 08:40

## 2023-03-02 RX ADMIN — INSULIN LISPRO 16 UNITS: 100 INJECTION, SOLUTION INTRAVENOUS; SUBCUTANEOUS at 20:55

## 2023-03-02 RX ADMIN — AMLODIPINE BESYLATE 5 MG: 5 TABLET ORAL at 08:39

## 2023-03-02 RX ADMIN — BUMETANIDE 3 MG: 1 TABLET ORAL at 08:39

## 2023-03-02 RX ADMIN — ATORVASTATIN CALCIUM 80 MG: 80 TABLET, FILM COATED ORAL at 20:54

## 2023-03-02 RX ADMIN — TICAGRELOR 90 MG: 90 TABLET ORAL at 13:10

## 2023-03-02 RX ADMIN — CARVEDILOL 6.25 MG: 6.25 TABLET, FILM COATED ORAL at 08:39

## 2023-03-02 ASSESSMENT — ENCOUNTER SYMPTOMS
ABDOMINAL PAIN: 0
SHORTNESS OF BREATH: 1
NAUSEA: 0
COUGH: 1
VOMITING: 0

## 2023-03-02 ASSESSMENT — PAIN DESCRIPTION - DESCRIPTORS: DESCRIPTORS: ACHING

## 2023-03-02 ASSESSMENT — PAIN SCALES - GENERAL: PAINLEVEL_OUTOF10: 3

## 2023-03-02 ASSESSMENT — PAIN DESCRIPTION - LOCATION: LOCATION: HEAD

## 2023-03-02 NOTE — PROGRESS NOTES
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 Expressing concern about previous HD treatments causing her to pass out and not remember who her family is. Teachings given as to only 2 hours and removing 2 liters. Hailey requesting someone to be at bedside to make sure she's awake and talking and without LOC. Writer asks that if staff unable to sit with her, then can she? Roland Anderson agrees to sit with her. HD nurse aware of above concerns and tx is scheduled to be around 1:15 pm today. Dr Manav Joyce aware of above concerns also.

## 2023-03-02 NOTE — PROGRESS NOTES
[facility]   Progress Note                   Date:   3/2/2023  Patient name: Laurent Hicks  Date of admission:  2/27/2023  9:54 AM  MRN:   [id number]  YOB: 1960  PCP: Tarah Cevallos, APRN - CNP    Reason for Admission:     Subjective:       Clinical Changes / Abnormalities: Seen & examined alone in room. No acute CV issues/concerns overnight. Labs, vitals, & tele reviewed. Remains SOB at rest; denies CP or lightheadedness. Remains SR. Remains anxious at times. Medications:   Scheduled Meds:   insulin glargine  55 Units SubCUTAneous BID    insulin lispro  0-16 Units SubCUTAneous Q4H    guaiFENesin  600 mg Oral BID    bumetanide  3 mg Oral BID    dextromethorphan  60 mg Oral 2 times per day    sodium chloride flush  5-40 mL IntraVENous 2 times per day    heparin (porcine)  5,000 Units SubCUTAneous 3 times per day    allopurinol  100 mg Oral Daily    aspirin  81 mg Oral Daily    amLODIPine  5 mg Oral Daily    atorvastatin  80 mg Oral Nightly    carvedilol  6.25 mg Oral BID    gabapentin  300 mg Oral Daily    pantoprazole  40 mg Oral QAM AC    tamsulosin  0.4 mg Oral Daily    ticagrelor  90 mg Oral BID     Continuous Infusions:   sodium chloride      dextrose       CBC:   Recent Labs     02/28/23  0537 03/01/23  0613 03/02/23  0512   WBC 6.9 7.2 12.7*   HGB 10.8* 10.8* 11.3*    154 175       BMP:    Recent Labs     02/28/23  0537 03/01/23  0613 03/02/23  0512    138 140   K 3.6* 5.0 5.3    100 101   CO2 30 28 28   BUN 49* 55* 65*   CREATININE 2.52* 2.66* 3.00*   GLUCOSE 73 355* 340*       Hepatic: No results for input(s): AST, ALT, ALB, BILITOT, ALKPHOS in the last 72 hours. Troponin: No results for input(s): TROPONINI in the last 72 hours. BNP: No results for input(s): BNP in the last 72 hours. Lipids: No results for input(s): CHOL, HDL in the last 72 hours. Invalid input(s): LDLCALCU  INR: No results for input(s): INR in the last 72 hours.     Objective: Vitals: BP (!) 106/55   Pulse 62   Temp 97.7 °F (36.5 °C) (Axillary)   Resp 18   Ht 5' 5\" (1.651 m)   Wt 250 lb 3.6 oz (113.5 kg)   SpO2 92%   BMI 41.64 kg/m²   General appearance: alert and cooperative with exam  HEENT: Head: Normocephalic, no lesions, without obvious abnormality. Neck: no adenopathy, no carotid bruit, no JVD, supple, symmetrical, trachea midline and thyroid not enlarged, symmetric, no tenderness/mass/nodules  Lungs: Diminished BS with faint bibasilar rales  Heart: regular rate and rhythm, S1, S2 normal, no murmur, click, rub or gallop  Abdomen: soft, non-tender; bowel sounds normal; no masses,  no organomegaly  Extremities: extremities normal, atraumatic, no cyanosis; 2-3+ edema  Neurologic: Mental status: Alert, oriented, thought content appropriate      ECHO: 1/27/23  Summary  Global left ventricular systolic function is normal.  Estimated LVEF 50-55%. Mild concentric left ventricular hypertrophy. Normal right ventricular size and function. No significant valvular regurgitation or stenosis seen. No pericardial effusion seen. LEXISCAN STRESS TEST 2/13/23      Impression       Large infarct in the lateral wall extending into the cardiac apex with some   stalin-infarct ischemia in anterolateral wall. LVEF normal.       Risk stratification: High risk patient has no prior history of evidence of   MI. Low risk if there is known prior history. CARDIAC CATHETERIZATION ( 2/14/23)    Conclusions:     Multivessel coronary artery disease. Patent LIMA-LAD, SVG-D and SVG-OM1. Severe diffuse disease post anastomosis of SVG-OM is known  and not amenable to PCI. Patent proximal RCA stent with new mid severe stenosis. RPDA has diffuse severe stenosis but small for  PCI. Successful PTCA/FADIA of mid RCA. Findings:  Cardiac Arteries and Lesion Findings  LMCA: has 80% diffuse disease. LAD: has proximal 100% occlusion. LIMA-LAD is patent. SVG-D is patent.   LCx: has proximal 100% occlusion. SVG-OM 1 is patent with severe diffuse disease post anastomosis of  SVG-OM is known and not amenable to PCI. RCA: has proximal patent stent with mid 85% stenosis. RPDA has diffuse 80% stenosis but is a small  vessel. RPL is normal.  Lesion on Mid RCA: Mid subsection. 85% stenosis 20 mm length reduced to 0%. Pre procedure  ABEL III flow was noted. Post Procedure ABEL III flow was present. Good runoff was present. The lesion  was diagnosed as High Risk (C). The lesion was discrete and eccentric. The lesion showed with  irregular contour, mild angulation and mild tortuosity. Devices used  Capital One Flex 180 cm. Number of passes: 1.   Euphora Balloon 2.5mm x 15mm. 3 inflation(s) to a max pressure of: 20 jayne.  Christiano [place] 2.75x22. 1 inflation(s) to a max pressure of: 20 jayne.  NC Euphora Balloon 3.0mm x 20mm.  2 inflation(s) to a max pressure of: 20 jayne     Dominance: Right     ssessment / Acute Cardiac Problems:   Acute-on-chronic HFpEF  Chronic hypoxic respiratory failure  CAD with hx of CABG and recent PCI with FADIA to mid-RCA on 2/14/23  Chronic troponin elevation, stable and c/w reduced renal clearance; no evidence of acute coronary syndrome  CKD Stage IV with baseline creat 2.6-3  HTN  Hx of frequent/recurrent UTI  ERICK  Morbid obesity with BMI > 40  Hx of DVT  Chronic lymphedema    Patient Active Problem List:     Atherosclerosis of coronary artery bypass graft of native heart without angina pectoris     Acute kidney injury superimposed on CKD (HCC)     Acute on chronic diastolic heart failure (HCC)     Diabetic polyneuropathy associated with type 2 diabetes mellitus ([facility] [address]     History of coronary artery bypass graft     Iron deficiency anemia     Spinal stenosis of lumbar region with neurogenic claudication     Mixed hyperlipidemia     CKD (chronic kidney disease) stage 4, GFR 15-29 ml/min ([facility])     Type 2 diabetes 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, with long-term current use of insulin ([facility])     Obesity, Class II, BMI 35-39.9     Thyroid nodule greater than or equal to 1 cm in diameter incidentally noted on imaging study     Hypertension, essential     Chronic ischemic heart disease     Ischemic stroke of frontal lobe (HCC)     Morbid obesity with BMI of 40.0-44.9, adult ([facility])     Disequilibrium syndrome     Anxiety     Chronic midline low back pain with bilateral sciatica     COVID     Cerebral hypoperfusion     Immature arteriovenous fistula ([facility])     History of fusion of cervical spine     Depression with anxiety     Vancomycin resistant Enterococcus UTI     Dialysis disequilibrium syndrome     Acute cystitis without hematuria     VRE infection (vancomycin resistant Enterococcus)     Infection due to ESBL-producing Klebsiella pneumoniae     Class 2 severe obesity due to excess calories with serious comorbidity and body mass index (BMI) of 35.0 to 35.9 in adult ([facility])     Type 2 diabetes mellitus with hyperglycemia, with long-term current use of insulin ([facility])     Right hemiparesis ([facility])     Ulcer of left foot, limited to breakdown of skin ([facility])     Secondary hyperparathyroidism (HCC)     Hypertensive urgency     Hypoxia     Congestive heart failure ([facility])     Nephrotic range proteinuria     Acute respiratory failure with hypoxia ([facility])     Syncope and collapse     Subacute cough     Acute on chronic heart failure, unspecified heart failure type ([facility])     Diarrhea of presumed infectious origin      Plan of Treatment:   Stable with slow improvement. Creat 3 today. Continue diuresis per nephrology recommendations, appreciate assistance with volume management. EF normal as above  Daily weight, increase activity, LE ACE wraps   Clinically no ischemic concerns at this time. Continue PO ASA, statin, BB, & Brilinta  BP stable. Continue Coreg & Norvasc.   Continuing IV Solumedrol and nebulizer per primary  Recommend increase activity    Electronically signed by Kera IRVIN  Lexy Blood - CNP on 3/2/2023 at 9:21 [address] [facility]  [phone]

## 2023-03-02 NOTE — PROGRESS NOTES
Dialysis RN spoke with Dr. Lauren Mccartney regarding patient. RN reported that patient's left arm AV Fistula was unable to be accessed, RN was able to access the arterial with sluggish blood return, RN attempted to access the venous and was unable to do so. Needles were removed, bleeding time was less than 10 minutes, gauze dressings applied. Orders received for an IR consult to insert a tunneled HD CVC tomorrow morning. Orders were entered and patient's primary nurse, Solange Cueto RN was updated. Patient returned to room.

## 2023-03-02 NOTE — PROGRESS NOTES
Pt is known to writer from previous admissions. Pt is concerned about HD starting again due to issues she had in the past. Writer provided listening presence and prayed with pt; she is also on communion list.    03/02/23 1232   Encounter Summary   Encounter Overview/Reason  Spiritual/Emotional Needs   Service Provided For: Patient   Referral/Consult From: Patient   Support System Family members   Last Encounter  03/02/23   Complexity of Encounter Moderate   Begin Time 1030   End Time  1050   Total Time Calculated 20 min   Spiritual/Emotional needs   Type Spiritual Support;Emotional Distress   Assessment/Intervention/Outcome   Assessment Anxious; Impaired resilience   Intervention Active listening;Discussed illness injury and its impact; Explored/Affirmed feelings, thoughts, concerns;Explored Coping Skills/Resources;Prayer (assurance of)/[place];Sustaining Presence/Ministry of presence   Outcome Engaged in conversation;Expressed feelings, needs, and concerns;Expressed Gratitude;Receptive;Comfort

## 2023-03-02 NOTE — CONSULTS
Physical Medicine & Rehabilitation  Consult Note      Admitting Physician: Priscila Rob MD    Primary Care Provider: Zulma Payne, APRN - CNP     Reason for Consult:  Acute Inpatient Rehabilitation    Chief Complaint: Shortness of breath    History of Present Illness:  Referring Provider is requesting an evaluation for appropriate placement upon discharge from acute care.     Ms. Estefany Garcia is a 62 y.o. female who was admitted to [facility] on 2/27/2023 with Shortness of Breath    62-year-old female with worsening shortness of breath for 3 days associated diarrhea came from nursing facility desatting 80s required 6 L of O2 in ER.  Chest x-ray showed interstitial opacities.  Admitted with acute hypoxic respiratory failure and CHF exacerbation.  Per patient and sister patient has been in and out of skilled nurse facility    [facility], reduce insulin, beginning dialysis ultrafiltration cardiology pulmonary nephrology on board,    Nephrology chronic kidney disease stage IV starting ultrafiltration continue Bumex shortness of breath multifactorial borderline hyperkalemia correct with dialysis    Pulmonary acute CHF exacerbation elevated BNP diastolic heart failure with preserved ejection fraction bilateral pleural effusions history of coronary artery disease with stent in February 2023 on Brilinta morbid obesity-restrictive lung disease, weaning oxygen diuresis given cardiorenal syndrome monitor labs noted recommendation for possible thoracentesis but unable to take off Brilinta    Cardiology-Daily weights continue aspirin beta-blocker statin and Brilinta        Radiology:  XR CHEST PORTABLE    Result Date: 3/1/2023  Cardiomegaly and congestive changes with increasing small pleural effusions and bibasilar atelectasis/edema.     XR CHEST PORTABLE    Result Date: 2/27/2023  1.  Unchanged mild vascular congestion and tiny pleural effusions. 2.  Increased interstitial opacities at the lung  bases, possibly interstitial edema versus developing pneumonia. Review of Systems:  Constitutional: negative for anorexia, chills, fatigue, fevers, sweats and weight loss  Eyes: negative for redness and visual disturbance  Ears, nose, mouth, throat, and face: negative for earaches, sore throat and tinnitus  Respiratory: negative for cough and shortness of breath  Cardiovascular: negative for chest pain, dyspnea and palpitations  Gastrointestinal: negative for abdominal pain, change in bowel habits, constipation, nausea and vomiting  Genitourinary:negative for dysuria, frequency, hesitancy and urinary incontinence  Integument/breast: negative for pruritus and rash  Musculoskeletal:negative for muscle weakness and stiff joints  Neurological: negative for dizziness, headaches and weakness  Behavioral/Psych: negative for decreased appetite, depression and fatigue    Functional History:  PTA: Independent with all activities. Current:  PT:    Bed Mobility Training  Bed Mobility Training: Yes  Overall Level of Assistance: Contact-guard assistance, Additional time  Interventions: Safety awareness training, Verbal cues, Weight shifting training/pressure relief  Scooting: Contact-guard assistance (scooting left<>right in long sitting for repositioning and pressure relief, SpO2 decreased from 90% on 5Lt > 88% on 5Lt with mvoement.)  Restrictions/Precautions  Restrictions/Precautions: Fall Risk, General Precautions  Required Braces or Orthoses?: Yes (Lymphedema)    Transfer Training  Transfer Training: No (patient declined transfer training this date 2* unstable O2 levels)  Bed mobility  Supine to Sit: Moderate assistance  Sit to Supine: Moderate assistance  Scooting: Minimal assistance  Bed Mobility Comments: Bed mobility completed with HOB elevated, use of rails, with increased time and rest breaks due to labored breathing. Verbal and visual cues for pursed lip and diaphragmatic breathing with Fair carryover.  Pt complains of dizzy/lightheadedness and nausea, no episodes of emesis. Pt states, \"i'm going to pass out\" and is prompty assisted back into bed, pt becomes panicked requesting writer to help her sit back up \"I can't breathe! \". Pt dangles EOB ~6 minutes in total while vitals are monitored and Nurse is notified.     Gait Training  Gait Training: No  Transfers  Comment: PINKY transfers this date d/t respiratory status and SpO2 Desaturation with light bed mobility         OT:   ADL  Feeding: Setup  Grooming: Setup  UE Bathing: Contact guard assistance  LE Bathing: Maximum assistance  UE Dressing: Contact guard assistance  LE Dressing: Maximum assistance  Toileting: Dependent/Total  Toileting Skilled Clinical Factors: External catheter/brief  Additional Comments: ADL scores based on clinical reasoning and skilled observation unless otherwise noted         ST:      Past Medical History:        Diagnosis Date    Arthritis     Backache, unspecified     Cerebral artery occlusion with cerebral infarction (HCC)     CHF (congestive heart failure) (HCC)     Chronic kidney disease     Coronary atherosclerosis of artery bypass graft     COVID 1/31/2022    Cramp of limb     Gallstones     Hemodialysis patient ([facility] [address]     Hyperlipidemia     Hypertension     Insomnia     Neuromuscular disorder ([facility] [address]     Pneumonia     Psychiatric problem     Thyroid disease     Type II or unspecified type diabetes mellitus with renal manifestations, not stated as uncontrolled(250.40)     Type II or unspecified type diabetes mellitus without mention of complication, not stated as uncontrolled     Unspecified vitamin D deficiency        Past Surgical History:        Procedure Laterality Date    ANKLE FRACTURE SURGERY      AV FISTULA CREATION  12/14/2021    BACK SURGERY      BREAST SURGERY      CARDIAC CATHETERIZATION  02/14/2023    Dr. Carley Salmeron, OPEN N/A     COLONOSCOPY      CORONARY ARTERY BYPASS GRAFT      x3    DIALYSIS FISTULA CREATION Left 12/14/2021    LEFT AV FISTULA CREATION UPPER EXTREMITY performed by Nivia Sandoval MD at [address]      IR TUNNELED CATHETER PLACEMENT GREATER THAN 5 YEARS  08/18/2021    IR TUNNELED CATHETER PLACEMENT GREATER THAN 5 YEARS 8/18/2021 [facility] SPECIAL PROCEDURES    KNEE ARTHROSCOPY      right    OTHER SURGICAL HISTORY  04/06/2022    cvc exchange    TONSILLECTOMY         Allergies: Allergies   Allergen Reactions    Adhesive Tape Other (See Comments) and Rash     Other reaction(s): Unknown    Isosorbide Dinitrate Angioedema    Ranexa [Ranolazine] Angioedema    Metformin And Related Diarrhea    Ace Inhibitors Other (See Comments)     Cough, states not good for her kidneys    Iv Dye [Iodides]      Stage 4 kidney disease    Nsaids      CANNOT TAKE D/T KIDNEY DISEASE    Metformin And Related Hives, Itching and Rash     Stage 4 kidney disease.         Current Medications:   Current Facility-Administered Medications: insulin glargine (LANTUS) injection vial 55 Units, 55 Units, SubCUTAneous, BID  insulin lispro (HUMALOG) injection vial 0-16 Units, 0-16 Units, SubCUTAneous, 4x Daily AC & HS  albuterol (PROVENTIL) nebulizer solution 2.5 mg, 2.5 mg, Nebulization, Q4H PRN  melatonin tablet 3 mg, 3 mg, Oral, Nightly PRN  guaiFENesin (MUCINEX) extended release tablet 600 mg, 600 mg, Oral, BID  benzonatate (TESSALON) capsule 100 mg, 100 mg, Oral, TID PRN  bumetanide (BUMEX) tablet 3 mg, 3 mg, Oral, BID  dextromethorphan (DELSYM) 30 MG/5ML extended release liquid 60 mg, 60 mg, Oral, 2 times per day  sodium chloride flush 0.9 % injection 5-40 mL, 5-40 mL, IntraVENous, 2 times per day  sodium chloride flush 0.9 % injection 5-40 mL, 5-40 mL, IntraVENous, PRN  0.9 % sodium chloride infusion, , IntraVENous, PRN  magnesium sulfate 2000 mg in water 50 mL IVPB, 2,000 mg, IntraVENous, PRN  ondansetron (ZOFRAN-ODT) disintegrating tablet 4 mg, 4 mg, Oral, Q8H PRN **OR** ondansetron (ZOFRAN) injection 4 mg, 4 mg, IntraVENous, Q6H PRN  polyethylene glycol (GLYCOLAX) packet 17 g, 17 g, Oral, Daily PRN  acetaminophen (TYLENOL) tablet 650 mg, 650 mg, Oral, Q6H PRN **OR** acetaminophen (TYLENOL) suppository 650 mg, 650 mg, Rectal, Q6H PRN  heparin (porcine) injection 5,000 Units, 5,000 Units, SubCUTAneous, 3 times per day  allopurinol (ZYLOPRIM) tablet 100 mg, 100 mg, Oral, Daily  aspirin chewable tablet 81 mg, 81 mg, Oral, Daily  amLODIPine (NORVASC) tablet 5 mg, 5 mg, Oral, Daily  atorvastatin (LIPITOR) tablet 80 mg, 80 mg, Oral, Nightly  carvedilol (COREG) tablet 6.25 mg, 6.25 mg, Oral, BID  gabapentin (NEURONTIN) capsule 300 mg, 300 mg, Oral, Daily  glucose chewable tablet 16 g, 4 tablet, Oral, PRN  dextrose bolus 10% 125 mL, 125 mL, IntraVENous, PRN **OR** dextrose bolus 10% 250 mL, 250 mL, IntraVENous, PRN  glucagon (rDNA) injection 1 mg, 1 mg, SubCUTAneous, PRN  dextrose 10 % infusion, , IntraVENous, Continuous PRN  pantoprazole (PROTONIX) tablet 40 mg, 40 mg, Oral, QAM AC  tamsulosin (FLOMAX) capsule 0.4 mg, 0.4 mg, Oral, Daily  ticagrelor (BRILINTA) tablet 90 mg, 90 mg, Oral, BID  albuterol (PROVENTIL) nebulizer solution 2.5 mg, 2.5 mg, Nebulization, Q4H PRN    Social History:  Social History     Socioeconomic History    Marital status: Single     Spouse name: Not on file    Number of children: Not on file    Years of education: Not on file    Highest education level: Not on file   Occupational History    Not on file   Tobacco Use    Smoking status: Never    Smokeless tobacco: Never   Vaping Use    Vaping Use: Never used   Substance and Sexual Activity    Alcohol use: No    Drug use: No    Sexual activity: Not Currently   Other Topics Concern    Not on file   Social History Narrative    Not on file     Social Determinants of Health     Financial Resource Strain: Low Risk     Difficulty of Paying Living Expenses: Not very hard   Food Insecurity:  Not on file   Transportation Needs: Not on file   Physical Activity: Insufficiently Active    Days of Exercise per Week: 1 day    Minutes of Exercise per Session: 20 min   Stress: Not on file   Social Connections: Not on file   Intimate Partner Violence: Not on file   Housing Stability: Not on file     Lives With: Parent (caregiver for mother)  [facility] ([address])  Home Layout: One level  Home Access: Ramped entrance  Bathroom Shower/Tub: Walk-in shower, Shower chair with back, Curtain  Bathroom Toilet: Handicap height  Bathroom Equipment: Grab bars in shower, Shower chair, Hand-held shower  Home Equipment: Cane, Rollator, Walker, rolling, Oxygen (O2 at 3.5L PRN)  Has the patient had two or more falls in the past year or any fall with injury in the past year?: Yes (at [employer])  Receives Help From: Family  ADL Assistance: Independent  Homemaking Assistance: Needs assistance ((Pt's sister assists with laundry, cooking, and cleaning))  Homemaking Responsibilities: Yes ((Pt's sister assists with laundry, cooking, and cleaning))  Ambulation Assistance: Independent (using rollator and O2 at 3.5 L)  Transfer Assistance: Independent  Active : Yes  Mode of Transportation: Car  Occupation: On disability  Leisure & Hobbies: scrapbooking  IADL Comments: sleeps in an adjustable bed  Additional Comments: was in rehab at [address] prior to this admission,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    Family History:       Problem Relation Age of Onset    Diabetes Father     Heart Failure Father            Physical Exam:    /63   Pulse 70   Temp 98.3 °F (36.8 °C)   Resp 18   Ht 5' 5\" (1.651 m)   Wt 242 lb 4.6 oz (109.9 kg)   SpO2 90%   BMI 40.32 kg/m²     General appearance: alert, appears stated age, cooperative, and no distress  HEENT: Normocephalic, without obvious abnormality, atraumatic               Eyes: conjunctivae clear.                 Throat: tongue normal. Neck:  symmetrical, trachea midline. Pulm: clear to auscultation bilaterally. Cardiac: regular rate and rhythm, no murmur. Abdomen: soft, non-tender; bowel sounds normal.  MSK: extremities normal, atraumatic, no edema, normal tone. ROM: Functional range of upper extremities, able to partially straight leg raise  Mental status/Psych: Alert, orientedX3, thought content appropriate. Year, president and location, follows commands  Skin:     Neuro:  Sensory: Intact in BUE and BLE to soft and pin sensation. Motor: Muscle tone and bulk are normal bilaterally. No pronator drift. Coordination: finger to nose normal bilaterally. Diagnostics:  CBC   Lab Results   Component Value Date/Time    WBC 12.7 03/02/2023 05:12 AM    RBC 3.59 03/02/2023 05:12 AM    HGB 11.3 03/02/2023 05:12 AM    HCT 34.5 03/02/2023 05:12 AM    MCV 96.1 03/02/2023 05:12 AM    RDW 14.8 03/02/2023 05:12 AM     03/02/2023 05:12 AM     BMP    Lab Results   Component Value Date/Time     03/02/2023 05:12 AM    K 5.3 03/02/2023 05:12 AM     03/02/2023 05:12 AM    CO2 28 03/02/2023 05:12 AM    BUN 65 03/02/2023 05:12 AM     Uric Acid  No components found for: URIC  VITAMIN B12 No components found for: B12  PT/INR  No results found for: PTINR      Impression: Ms. Antoinette Tsai is a 57 y.o. female with a history of Acute respiratory failure with hypoxia ([address]    Acute CHF exacerbation Bumex  History of diastolic heart failure with preserved ejection fraction  Bilateral pleural effusions  Acute on chronic hypoxic respiratory failure baseline 3 L nasal cannula-Tessalon, Mucinex, Proventil  Very artery disease status post stent February 2023 on Brilinta  Hypertension/hyperlipidemia-Norvasc, Lipitor, Coreg  chronic kidney disease previously on dialysis taken off approxi-6 months ago  Obstructive sleep apnea/obesity  Type 2 diabetes insulin  Restrictive lung disease  Lymphedema  Gout-allopurinol    Recommendations:  1.  Diagnosis: Deconditioning due to hypoxic respiratory failure secondary to CHF, diastolic heart failure, restrictive lung disease  2. Therapy: Minimal tolerance to therapy mod assist supine to sit unable to do further transfers or ambulation due to significant shortness of breath  3. Medical  Necessity: As above  4. Support: Lives with 80year-old mother  11. Rehab recommendation: Currently do not feel patient could tolerate intensive acute inpatient rehabilitation, minimal tolerance to therapy gets short of breath has not been able to transfer or do any ambulation-recommend SNF  6. DVT proph: Subcu heparin    Discussed with patient and sister present    It was my pleasure to evaluate Cristhian Hanson today. Please call with questions. Shelle Mortimer. Nguyen Riggs MD          This note is created with the assistance of a speech recognition program.  While intending to generate a document that actually reflects the content of the visit, the document can still have some errors including those of syntax and sound a like substitutions which may escape proof reading.   In such instances, actual meaning can be extrapolated by contextual diversion

## 2023-03-02 NOTE — CARE COORDINATION
Authorization has been approved for [facility], [address] [place] [address] through 3/6.     Electronically signed by [place] Peraza on 3/2/2023 at 3:54 PM

## 2023-03-02 NOTE — PROGRESS NOTES
Spoke with sister Gilda Cantoralka. Would like to speak with Dr. Sean Leigh prior to patient receiving dialysis today.  Notified primary RN, to page nephrologist.

## 2023-03-02 NOTE — CARE COORDINATION
ONGOING DISCHARGE PLAN:    Patient is alert and oriented x4.    Spoke with patient and sister regarding discharge plan and patient is requesting to go to Acute Rehab. I requested a PM&&R consult. Sister states patient has been to rehab before. They understand she would need to be able to tolerate 3 hours a day.     Plan for Ultrafiltration today. Patient has been on dialysis in the past for about 11 months. She has been off since August. She states Dr Darnell is the nephrologist. She was going to [facility] [address]. If she needs Hemodialysis again, they  would like [facility].    93% on 6L  Creatinine 3.00, BUN 65, on oral Bumex      Will continue to follow for additional discharge needs.    Electronically signed by Jasmin Pfeiffer RN on 3/2/2023 at 2:18 PM

## 2023-03-02 NOTE — PROGRESS NOTES
Department of Internal Medicine  Nephrology George Pool MD  Progress Note    Reason for consultation: Management of chronic kidney disease stage IV. Consulting physician: Romi Medina MD.    Interval history: Patient was seen and examined today and she still has dyspnea as well as peripheral edema. Spoke with the primary attending physician who also spoke with the patient as well as the pulmonologist, Dr. Grace Strange regarding potential benefits of ultrafiltration. Patient is agreeable to ultrafiltration and has a functional left brachiocephalic AV fistula which has good thrill and bruit. History of present illness: This is a 57 y.o. female with a significant past medical history of Obesity, type 2 diabetes mellitus with diabetic nephropathy, coronary artery disease [s/p CABG x3], systemic hypertension, heart failure with preserved ejection fraction [HFrEF] and chronic kidney disease stage IV secondary to diabetic and hypertensive nephropathy [was on hemodialysis for several months until discontinuation in August 2022 due to some recovery of renal function], who was sent in from the [address] [place] [address] for further evaluation of hypoxia. She stated that she has had progressively worsening shortness of breath and cough over the past 3 days prior to presentation and had diarrhea for 2 days. She also complains of general body aches, fatigue and dysuria. She is on chronic oxygen at the facility usually at 3 L/min flow. Oxygen saturation was in the 80% range even with increasing O2 flow to 6 L/min and hence presentation to the hospital.  Vital signs were stable at presentation with blood pressure 130/68 mmHg. Chest x-ray at presentation showed mild pulmonary vascular congestion with increased interstitial opacities at the lung bases. Laboratory studies revealed BUN/creatinine 50/2.39 mg/dL and hence nephrology consultation.     Scheduled Meds:   insulin glargine  55 Units SubCUTAneous BID    insulin lispro  0-16 Units SubCUTAneous Q4H    guaiFENesin  600 mg Oral BID    bumetanide  3 mg Oral BID    dextromethorphan  60 mg Oral 2 times per day    sodium chloride flush  5-40 mL IntraVENous 2 times per day    heparin (porcine)  5,000 Units SubCUTAneous 3 times per day    allopurinol  100 mg Oral Daily    aspirin  81 mg Oral Daily    amLODIPine  5 mg Oral Daily    atorvastatin  80 mg Oral Nightly    carvedilol  6.25 mg Oral BID    gabapentin  300 mg Oral Daily    pantoprazole  40 mg Oral QAM AC    tamsulosin  0.4 mg Oral Daily    ticagrelor  90 mg Oral BID     Continuous Infusions:   sodium chloride      dextrose       Physical Exam:    VITALS:  BP (!) 106/55   Pulse 62   Temp 97.7 °F (36.5 °C) (Axillary)   Resp 18   Ht 5' 5\" (1.651 m)   Wt 250 lb 3.6 oz (113.5 kg)   SpO2 92%   BMI 41.64 kg/m²   TEMPERATURE:  Current - Temp: 97.7 °F (36.5 °C); Max - Temp  Av.1 °F (36.7 °C)  Min: 97.4 °F (36.3 °C)  Max: 98.9 °F (37.2 °C)  RESPIRATIONS RANGE: Resp  Av.8  Min: 16  Max: 20  PULSE RANGE: Pulse  Av.2  Min: 62  Max: 72  BLOOD PRESSURE RANGE:  Systolic (74YTG), JWJ:505 , Min:106 , GNV:498 ; Diastolic (66LRW), WTL:94, Min:50, Max:75  PULSE OXIMETRY RANGE: SpO2  Av.8 %  Min: 92 %  Max: 94 %  24HR INTAKE/OUTPUT:    Intake/Output Summary (Last 24 hours) at 3/2/2023 1215  Last data filed at 3/2/2023 1027  Gross per 24 hour   Intake 1075 ml   Output 500 ml   Net 575 ml       Constitutional: alert, appears stated age, and cooperative    Skin: Skin color, texture, turgor normal. No rashes or lesions    Head: Normocephalic, without obvious abnormality, atraumatic     Cardiovascular/Edema: regular rate and rhythm, S1, S2 normal, no murmur, click, rub or gallop    Respiratory: Diminished breath sounds with bilateral basal Rales    Abdomen: soft, non-tender; bowel sounds normal; no masses,  no organomegaly    Back: symmetric, no curvature. ROM normal. No CVA tenderness.     Extremities: Bilateral pitting pedal edema 2+; left arm AV fistula with good thrill and bruit. Neuro:  Grossly normal    CBC:   Recent Labs     02/28/23  0537 03/01/23  0613 03/02/23  0512   WBC 6.9 7.2 12.7*   HGB 10.8* 10.8* 11.3*    154 175       BMP:    Recent Labs     02/28/23  0537 03/01/23  0613 03/02/23  0512    138 140   K 3.6* 5.0 5.3    100 101   CO2 30 28 28   BUN 49* 55* 65*   CREATININE 2.52* 2.66* 3.00*   GLUCOSE 73 355* 340*       Lab Results   Component Value Date/Time    NITRU NEGATIVE 02/27/2023 03:47 PM    COLORU Yellow 02/27/2023 03:47 PM    PHUR 6.5 02/27/2023 03:47 PM    WBCUA 0 TO 2 02/27/2023 03:47 PM    RBCUA 0 TO 2 02/27/2023 03:47 PM    MUCUS 1+ 02/07/2023 03:00 PM    TRICHOMONAS NOT REPORTED 11/14/2021 02:05 AM    YEAST FEW 02/07/2023 03:00 PM    BACTERIA None 02/27/2023 03:47 PM    SPECGRAV 1.011 02/27/2023 03:47 PM    LEUKOCYTESUR NEGATIVE 02/27/2023 03:47 PM    UROBILINOGEN Normal 02/27/2023 03:47 PM    BILIRUBINUR NEGATIVE 02/27/2023 03:47 PM    GLUCOSEU NEGATIVE 02/27/2023 03:47 PM    KETUA NEGATIVE 02/27/2023 03:47 PM    AMORPHOUS 1+ 02/25/2023 03:15 AM     Urine Sodium:     Lab Results   Component Value Date/Time    JASON 47 11/14/2021 02:04 AM     Urine Chloride:    Lab Results   Component Value Date/Time    CLUR 26 11/14/2021 02:04 AM     Urine Protein:     Lab Results   Component Value Date/Time    TPU 20 11/12/2021 10:30 AM     Urine Creatinine:     Lab Results   Component Value Date/Time    LABCREA 65.4 01/26/2023 01:10 PM     IMPRESSION/RECOMMENDATIONS:      1. Chronic kidney disease stage IV [baseline serum creatinine 2 to 3 mg/dL] renal function is stable and CKD in this patient is consistent with diabetic and hypertensive nephropathy as well as cardiorenal syndrome. She has peripheral edema and has indications for continuation of diuretics. Patient has agreed to undergo ultrafiltration and we will schedule for today using AV fistula.   Plan: Continue Bumex 3 mg p.o. twice daily. Hepatitis B and C serologies. We will schedule for hemodialysis with ultrafiltration today for 2 hours. Monitor urine output closely. Basic metabolic profile daily. 2.  Shortness of breath - multifactorial etiology including secondary to decompensated diastolic heart failure. Schedule for removal of 2 L of fluid with ultrafiltration today. Daily weights.  2 g sodium per day. 3.  Systemic hypertension - blood pressure control is adequate. 4.  Borderline hyperkalemia - We will correct with dialysis. Prognosis is guarded.     George Pool MD FACP  Attending Nephrologist  3/2/2023 12:15 PM

## 2023-03-02 NOTE — PROGRESS NOTES
[address]  Family Medicine        Progress Note      Date:   3/2/2023  Patient name:  Lyndsay Archer  Date of admission:  2/27/2023  9:54 AM  MRN:   [id number]  YOB: 1960    SUBJECTIVE:     Patient was seen and examined at bedside patient is still having significant dyspnea and nonproductive cough. Currently there is no improvement with the steroids I will stop them as her sugars are climbing above 400. I had a discussion with Dr. Doroteo Munguia yesterday, as well as with Dr. Evangelist Mchugh regarding our options. Her dyspnea is secondary to pleural effusion and venous congestion, there is no intrinsic lung process. Her options are thoracentesis, ultrafiltration or staying the course with Bumex. Currently there has been no improvement with Bumex and creatinine is trending up. Patient is hesitant to do dialysis due to history of cognitive impairment after dialysis sessions. I spoke to the patient's sister Gogo Ernst and we discussed this at length. Thoracentesis is currently not a good option because she would have to be off Brilinta for 7 days, cardiology is not comfortable with that as the patient recently had cardiac stent. Consultant notes, labs & imaging reviewed. Case was discussed with nursing staff. Brief HPI    Pt admitted for respiratory failure requiring higher oxygen than normal    Review of Systems   Constitutional:  Negative for chills, diaphoresis and fever. Respiratory:  Positive for cough and shortness of breath. Cardiovascular:  Negative for palpitations. Chest pain: Chest pressure substernal, chronic. Gastrointestinal:  Negative for abdominal pain, nausea and vomiting. Genitourinary:  Negative for difficulty urinating and dysuria. Neurological:  Negative for dizziness, light-headedness and headaches.        OBJECTIVE:     /62   Pulse 65   Temp 97.6 °F (36.4 °C)   Resp 18   Ht 5' 5\" (1.651 m)   Wt 250 lb 3.6 oz (113.5 kg)   SpO2 93%   BMI 41.64 kg/m²      Physical Exam  Vitals and nursing note reviewed. Eyes:      Extraocular Movements: Extraocular movements intact. Conjunctiva/sclera: Conjunctivae normal.   Cardiovascular:      Rate and Rhythm: Normal rate and regular rhythm. Pulses: Normal pulses. Heart sounds: Normal heart sounds. Pulmonary:      Breath sounds: Rales present. No wheezing. Comments: Fine rales at bases  Abdominal:      General: There is no distension. Palpations: Abdomen is soft. Tenderness: There is no abdominal tenderness. There is no guarding. Musculoskeletal:      Right lower leg: Edema present. Left lower leg: Edema present. Neurological:      General: No focal deficit present. Mental Status: She is alert and oriented to person, place, and time.         Intake/Output:    Intake/Output Summary (Last 24 hours) at 3/2/2023 1233  Last data filed at 3/2/2023 1222  Gross per 24 hour   Intake 1475 ml   Output 500 ml   Net 975 ml       Laboratory Testing:  CBC:   Recent Labs     03/02/23  0512   WBC 12.7*   HGB 11.3*          BMP:    Recent Labs     02/28/23  0537 03/01/23  0613 03/02/23  0512    138 140   K 3.6* 5.0 5.3    100 101   CO2 30 28 28   BUN 49* 55* 65*   CREATININE 2.52* 2.66* 3.00*   GLUCOSE 73 355* 340*       Magnesium:   Lab Results   Component Value Date/Time    MG 2.2 02/12/2023 06:07 AM     Phosphorus:   Lab Results   Component Value Date/Time    PHOS 4.0 01/27/2023 03:09 AM     Ionized Calcium:   Lab Results   Component Value Date/Time    CAION 1.04 04/18/2022 12:27 PM      PT/INR:    Lab Results   Component Value Date/Time    PROTIME 10.7 01/26/2023 06:44 AM    PROTIME 16.4 08/27/2015 12:00 AM    PROTIME 16.4 08/27/2015 12:00 AM    INR 1.0 01/26/2023 06:44 AM     PTT:    Lab Results   Component Value Date/Time    APTT 27.5 11/10/2021 03:50 PM       ASSESSMENT/PLAN     Principal Problem:    Acute respiratory failure with hypoxia (HCC)  Active Problems:    Type 2 diabetes mellitus with hyperglycemia, with long-term current use of insulin ([facility])    Acute on chronic heart failure, unspecified heart failure type ([facility]    Diarrhea of presumed infectious origin    CKD (chronic kidney disease) stage 4, GFR 15-29 ml/min ([facility])  Resolved Problems:    * No resolved hospital problems. *      1.stop Solu-Medrol  2.reduce insulin back to 55 units twice daily with a high dose scale  3. Patient will undergo dialysis with ultrafiltration  4. There is a sepsis warning however I do not have high suspicion for sepsis at this time, will continue to monitor  6. Continue amlodipine, coreg.,  Bumex  6. Cardio, pulm, nephro on board    DVT:heparin subcu    >50 Minutes spent reviewing previous notes and test results, interviewing the patient, discussing the diagnosis and treatment plan with the patient, family and other physicians, as well as documenting on the day of the visit.      Jessica Durán MD   3/2/2023 12:33 PM

## 2023-03-02 NOTE — PROGRESS NOTES
Jason Bhatt MD/Carter Goins MD/ Diann Duarte MD/Dr Gamal Nuñez APRN AGACNP-BC, NP-C      Julia Celaya APRN NP-C     Luis Villarreal APRN NP-C                                           Pulmonary Progress Note    Patient - Nancy Roman   Age - 57 y.o.   - 1960  MRN - [id number]  Acct # - [de-identified]  Date of Admission - 2023  9:54 AM    Consulting Service/Physician:       Primary Care Physician: AFSANEH Castillo - CNP    SUBJECTIVE:     Chief Complaint:   Chief Complaint   Patient presents with    Shortness of Breath     Subjective:    Leah Craig continues on 6 L nasal cannula. Pulse ox is 92%. She has been afebrile. She continues on Bumex 3 mg twice a day. Chest x-ray yesterday showed some congestion with continued small bilateral pleural effusions slightly greater on the right. Creatinine today is 3.0. She tells me there is a plan for possible ultrafiltration dialysis. She tells me at home she was using oxygen intermittently at 3 L. She feels her swelling is slightly improved. VITALS  BP (!) 106/55   Pulse 62   Temp 97.7 °F (36.5 °C) (Axillary)   Resp 18   Ht 5' 5\" (1.651 m)   Wt 250 lb 3.6 oz (113.5 kg)   SpO2 92%   BMI 41.64 kg/m²   Wt Readings from Last 3 Encounters:   23 250 lb 3.6 oz (113.5 kg)   23 248 lb (112.5 kg)   23 248 lb 2.6 oz (112.6 kg)     I/O (24 Hours)    Intake/Output Summary (Last 24 hours) at 3/2/2023 0820  Last data filed at 3/1/2023 2208  Gross per 24 hour   Intake 825 ml   Output --   Net 825 ml     Ventilator:      Exam:   Physical Exam   Constitutional: Obese female sitting up in bed on the phone with her sister on 6 L nasal cannula in no distress  HENT: Unremarkable  Head: Normocephalic and atraumatic. Eyes: EOM are normal. Pupils are equal, round, and reactive to light. Neck: Neck supple. Cardiovascular:  Regular rate and rhythm. Normal heart tones.   No JVD.    Pulmonary/Chest: Diminished posteriorly, no wheezing, on 6 L nasal cannula with pulse ox 92%  Abdominal: Soft. Bowel sounds are normal.   Musculoskeletal: Normal range of motion. Neurological: Patient is alert and oriented to person, place, and time. Skin: Skin is warm and dry. No rash noted.    Extremities: Bilateral lower extremity edema  Infusions:      sodium chloride      dextrose       Meds:     Current Facility-Administered Medications:     insulin glargine (LANTUS) injection vial 60 Units, 60 Units, SubCUTAneous, BID, Elisa Bowen MD, 60 Units at 03/01/23 2216    insulin lispro (HUMALOG) injection vial 0-16 Units, 0-16 Units, SubCUTAneous, Q4H, Elisa Bowen MD, 12 Units at 03/02/23 5017    albuterol (PROVENTIL) nebulizer solution 2.5 mg, 2.5 mg, Nebulization, Q4H PRN, Dominic Peck MD    melatonin tablet 3 mg, 3 mg, Oral, Nightly PRN, Elisa Bowen MD    guaiFENesin [facility]) extended release tablet 600 mg, 600 mg, Oral, BID, Elisa Bowen MD, 600 mg at 03/01/23 2207    benzonatate (TESSALON) capsule 100 mg, 100 mg, Oral, TID PRN, Elisa Bowen MD    bumetanide (BUMEX) tablet 3 mg, 3 mg, Oral, BID, AFSANEH Noguera - CNP, 3 mg at 03/01/23 1826    dextromethorphan (DELSYM) 30 MG/5ML extended release liquid 60 mg, 60 mg, Oral, 2 times per day, AFSANEH Villanueva - CNP, 60 mg at 03/01/23 2207    sodium chloride flush 0.9 % injection 5-40 mL, 5-40 mL, IntraVENous, 2 times per day, Elisa Bowen MD, 10 mL at 03/01/23 2208    sodium chloride flush 0.9 % injection 5-40 mL, 5-40 mL, IntraVENous, PRN, Elisa Bowen MD    0.9 % sodium chloride infusion, , IntraVENous, PRN, Elisa Bowen MD    magnesium sulfate 2000 mg in water 50 mL IVPB, 2,000 mg, IntraVENous, PRN, Elisa Bowen MD    ondansetron (ZOFRAN-ODT) disintegrating tablet 4 mg, 4 mg, Oral, Q8H PRN, 4 mg at 02/28/23 1626 **OR** ondansetron (ZOFRAN) injection 4 mg, 4 mg, IntraVENous, Q6H PRN, Arianna Olvera Izabella Lucia MD    polyethylene glycol (GLYCOLAX) packet 17 g, 17 g, Oral, Daily PRN, Jessica Durán MD    acetaminophen (TYLENOL) tablet 650 mg, 650 mg, Oral, Q6H PRN, 650 mg at 03/01/23 0002 **OR** acetaminophen (TYLENOL) suppository 650 mg, 650 mg, Rectal, Q6H PRN, Jessica Durán MD    heparin (porcine) injection 5,000 Units, 5,000 Units, SubCUTAneous, 3 times per day, Jessica Durán MD, 5,000 Units at 03/02/23 3801    allopurinol (ZYLOPRIM) tablet 100 mg, 100 mg, Oral, Daily, Jessica Durán MD, 100 mg at 03/01/23 7243    aspirin chewable tablet 81 mg, 81 mg, Oral, Daily, Jessica Durán MD, 81 mg at 03/01/23 0835    amLODIPine (NORVASC) tablet 5 mg, 5 mg, Oral, Daily, Jessica Durán MD, 5 mg at 03/01/23 0834    atorvastatin (LIPITOR) tablet 80 mg, 80 mg, Oral, Nightly, Jessica Durán MD, 80 mg at 03/01/23 2207    carvedilol (COREG) tablet 6.25 mg, 6.25 mg, Oral, BID, Jessica Durán MD, 6.25 mg at 03/01/23 2207    gabapentin (NEURONTIN) capsule 300 mg, 300 mg, Oral, Daily, Jessica Durán MD, 300 mg at 03/01/23 0834    glucose chewable tablet 16 g, 4 tablet, Oral, PRN, Jessica Durán MD    dextrose bolus 10% 125 mL, 125 mL, IntraVENous, PRN **OR** dextrose bolus 10% 250 mL, 250 mL, IntraVENous, PRN, Jessica Durán MD    glucagon (rDNA) injection 1 mg, 1 mg, SubCUTAneous, PRN, Jessica Durán MD    dextrose 10 % infusion, , IntraVENous, Continuous PRN, Jessica Durán MD    pantoprazole (PROTONIX) tablet 40 mg, 40 mg, Oral, QAM AC, Jessica Durán MD, 40 mg at 03/02/23 6330    tamsulosin (FLOMAX) capsule 0.4 mg, 0.4 mg, Oral, Daily, Jessica Durán MD, 0.4 mg at 03/01/23 0835    ticagrelor (BRILINTA) tablet 90 mg, 90 mg, Oral, BID, Jessica Durán MD, 90 mg at 03/01/23 2207    albuterol (PROVENTIL) nebulizer solution 2.5 mg, 2.5 mg, Nebulization, Q4H PRN, Tracee Mcghee MD, 2.5 mg at 02/28/23 0055    Lab Results:     Lab Results   Component Value Date    WBC 12.7 (H) 03/02/2023    HGB 11.3 (L) 03/02/2023    HCT 34.5 (L) 03/02/2023    MCV 96.1 03/02/2023     03/02/2023     Lab Results   Component Value Date    CALCIUM 9.3 03/02/2023     03/02/2023    K 5.3 03/02/2023    CO2 28 03/02/2023     03/02/2023    BUN 65 (H) 03/02/2023    CREATININE 3.00 (H) 03/02/2023       Lab Results   Component Value Date    INR 1.0 01/26/2023    PROTIME 10.7 01/26/2023       Radiology:       ASSESSMENT:       Acute CHF exacerbation; elevated BNP  History of diastolic heart failure with preserved ejection fraction  Bilateral pleural effusions  Elevated high-sensitivity troponin  Acute on chronic hypoxemic respiratory failure baseline 3 L nasal cannula currently on 6 L  History of coronary artery disease status post stent placement February 2023 on Brilinta  Stage IV chronic kidney disease previously on dialysis taken off approximately 6 months ago  Obstructive sleep apnea  Type 2 diabetes  Morbid obesity  Restrictive lung pattern on recent PFTs  History of DVT  Lymphedema-chronic  Life long non-smoker  Full Code  PLAN:   Wean oxygen as able, keep pulse ox above 89%  Diuresis per nephrology, given her cardiorenal syndrome and fluid overload. Continue to monitor labs    Repeat BNP  Discussed by phone with Sister       Electronically signed by AFSANEH Wright CNP on 03/02/23     This progress note was completed using a voice transcription system. Every effort was made to ensure accuracy. However, inadvertent computerized transcription errors may be present. Russell Maya, NP-C, MSN  [facility]monary, Critical Care [facility] Sleep       I had a lengthy conversation with patient's Sister [facility],[phone]     She voiced understanding that we probably should not do the thoracentesis given the patient is on [address]. She voiced understanding that we will take 5 to 7 days for the Brilinta to wear off as if we stop it today. She is on it today.     She understands that the general medicine admitting team will reach out to nephrology regarding plan of care on removing fluid.     Kenzie Almaraz, patient's sister, voiced appreciation of the call    eJsu Donaldson MD

## 2023-03-02 NOTE — PROGRESS NOTES
Pulmonary Progress Note  [facility]      Patient - Flores Leach,  Age - 57 y.o.    - 1960      Room Number - 01   MRN -  [id number]   Acct # - [de-identified]  Date of Admission -  2023  9:54 AM      Consulting Ruperto Mart MD  Primary Care Physician - AFSANEH Barrera - CNP     SUBJECTIVE   Patient is not in any distress. She is on 6 L of oxygen with O2 saturations 92-93%    OBJECTIVE   VITALS    height is 5' 5\" (1.651 m) and weight is 250 lb 3.6 oz (113.5 kg). Her oral temperature is 97.4 °F (36.3 °C). Her blood pressure is 127/75 and her pulse is 72. Her respiration is 17 and oxygen saturation is 93%. Body mass index is 41.64 kg/m². Temperature Range: Temp: 97.4 °F (36.3 °C) Temp  Av °F (36.7 °C)  Min: 97.4 °F (36.3 °C)  Max: 98.3 °F (36.8 °C)  BP Range:  Systolic (46DFX), RUDDY:103 , Min:119 , XWX:421     Diastolic (52UKU), EPQ:84, Min:47, Max:75    Pulse Range: Pulse  Av.2  Min: 69  Max: 74  Respiration Range: Resp  Av.8  Min: 16  Max: 18  Current Pulse Ox[de-identified]  SpO2: 93 %  24HR Pulse Ox Range:  SpO2  Av.9 %  Min: 89 %  Max: 94 %  Oxygen Amount and Delivery: O2 Flow Rate (L/min): 6 L/min    Wt Readings from Last 3 Encounters:   23 250 lb 3.6 oz (113.5 kg)   23 248 lb (112.5 kg)   23 248 lb 2.6 oz (112.6 kg)       I/O (24 Hours)    Intake/Output Summary (Last 24 hours) at 3/1/2023 2059  Last data filed at 3/1/2023 1243  Gross per 24 hour   Intake 800 ml   Output 650 ml   Net 150 ml       EXAM     General Appearance  Awake, alert, oriented, in no acute distress  HEENT - normocephalic, atraumatic. Neck - Supple,  trachea midline   Lungs -coarse breath sounds no crackles rales or wheezes. Breath sounds at the base on the right  Heart Exam:PMI normal. No lifts, heaves, or thrills. RRR. No murmurs, clicks, gallops, or rubs  Abdomen Exam: Abdomen soft, non-tender.    Extremity Exam: No signs of cyanosis    MEDS      insulin glargine  60 Units SubCUTAneous BID    insulin lispro  0-16 Units SubCUTAneous Q4H    guaiFENesin  600 mg Oral BID    bumetanide  3 mg Oral BID    dextromethorphan  60 mg Oral 2 times per day    methylPREDNISolone  40 mg IntraVENous Q12H    sodium chloride flush  5-40 mL IntraVENous 2 times per day    heparin (porcine)  5,000 Units SubCUTAneous 3 times per day    allopurinol  100 mg Oral Daily    aspirin  81 mg Oral Daily    amLODIPine  5 mg Oral Daily    atorvastatin  80 mg Oral Nightly    carvedilol  6.25 mg Oral BID    gabapentin  300 mg Oral Daily    pantoprazole  40 mg Oral QAM AC    tamsulosin  0.4 mg Oral Daily    ticagrelor  90 mg Oral BID      sodium chloride      dextrose       albuterol, benzonatate, sodium chloride flush, sodium chloride, magnesium sulfate, ondansetron **OR** ondansetron, polyethylene glycol, acetaminophen **OR** acetaminophen, glucose, dextrose bolus **OR** dextrose bolus, glucagon (rDNA), dextrose, albuterol    LABS   CBC   Recent Labs     03/01/23  0613   WBC 7.2   HGB 10.8*   HCT 32.6*   MCV 96.1        BMP:   Lab Results   Component Value Date/Time     03/01/2023 06:13 AM    K 5.0 03/01/2023 06:13 AM     03/01/2023 06:13 AM    CO2 28 03/01/2023 06:13 AM    BUN 55 03/01/2023 06:13 AM    LABALBU 3.0 02/24/2023 06:15 AM    CREATININE 2.66 03/01/2023 06:13 AM    CALCIUM 8.8 03/01/2023 06:13 AM    GFRAA 27 09/06/2022 04:40 PM    LABGLOM 19 03/01/2023 06:13 AM     ABGs:  Lab Results   Component Value Date/Time    PHART 7.429 04/24/2022 01:42 PM    PO2ART 47.6 04/24/2022 01:42 PM    SWO5ABY 45.9 04/24/2022 01:42 PM      Lab Results   Component Value Date/Time    MODE CONDITION NO LONGER WARRANTS 04/06/2021 07:49 PM     Ionized Calcium:  No results found for: IONCA  Magnesium:    Lab Results   Component Value Date/Time    MG 2.2 02/12/2023 06:07 AM     Phosphorus:    Lab Results   Component Value Date/Time    PHOS 4.0 01/27/2023 03:09 AM LIVER PROFILE No results for input(s): AST, ALT, LIPASE, BILIDIR, BILITOT, ALKPHOS in the last 72 hours. Invalid input(s): AMYLASE,  ALB  INR No results for input(s): INR in the last 72 hours.   PTT   Lab Results   Component Value Date    APTT 27.5 11/10/2021         RADIOLOGY     (See actual reports for details)    ASSESSMENT/PLAN     Patient Active Problem List   Diagnosis    Atherosclerosis of coronary artery bypass graft of native heart without angina pectoris    Acute kidney injury superimposed on CKD ([facility])    Acute on chronic diastolic heart failure ([facility])    Diabetic polyneuropathy associated with type 2 diabetes mellitus ([facility] [address]    History of coronary artery bypass graft    Iron deficiency anemia    Spinal stenosis of lumbar region with neurogenic claudication    Mixed hyperlipidemia    CKD (chronic kidney disease) stage 4, GFR 15-29 ml/min ([facility])    Type 2 diabetes 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, with long-term current use of insulin ([facility])    Obesity, Class II, BMI 35-39.9    Thyroid nodule greater than or equal to 1 cm in diameter incidentally noted on imaging study    Hypertension, essential    Chronic ischemic heart disease    Ischemic stroke of frontal lobe ([facility] [address]    Morbid obesity with BMI of 40.0-44.9, adult ([facility])    Disequilibrium syndrome    Anxiety    Chronic midline low back pain with bilateral sciatica    COVID    Cerebral hypoperfusion    Immature arteriovenous fistula ([facility])    History of fusion of cervical spine    Depression with anxiety    Vancomycin resistant Enterococcus UTI    Dialysis disequilibrium syndrome    Acute cystitis without hematuria    VRE infection (vancomycin resistant Enterococcus)    Infection due to ESBL-producing Klebsiella pneumoniae    Class 2 severe obesity due to excess calories with serious comorbidity and body mass index (BMI) of 35.0 to 35.9 in adult [facility])    Type 2 diabetes mellitus with hyperglycemia, with long-term current use of insulin (HCC)    Right hemiparesis (HCC)    Ulcer of left foot, limited to breakdown of skin ([address]    Secondary hyperparathyroidism ([address]    Hypertensive urgency    Hypoxia    Congestive heart failure (HCC)    Nephrotic range proteinuria    Acute respiratory failure with hypoxia (HCC)    Syncope and collapse    Subacute cough    Acute on chronic heart failure, unspecified heart failure type (HCC)    Diarrhea of presumed infectious origin     Acute CHF exacerbation; obese BNP  History of diastolic heart failure with preserved ejection fraction  Bilateral pleural effusions  Elevated high-sensitivity troponin  Acute on chronic hypoxemic respiratory failure baseline 3 L nasal cannula  History of coronary artery disease status post stent placement February 2023 on Brilinta  Stage IV chronic kidney disease previously on dialysis taken off approximately 6 months ago  Obstructive sleep apnea  Type 2 diabetes  Morbid obesity  Restrictive lung pattern on recent PFTs  History of DVT  Lymphedema-chronic  Life long non-smoker  Full Code    Patient does not have COPD. She is a non-smoker. We will stop the DuoNeb treatments scheduled and do albuterol treatments as needed. I had an extremely lengthy conversation with with patient and patient's Sister Jocelyn Kothari over the telephone. My conversation with the patient's sister over the telephone lasted almost 40 minutes    I explained to the daughter that I went and took the time to show the patient the labs and chest x-rays on the computer. I told her that she has evidence of a cardiorenal type syndrome where she is got pulmonary edema and bilateral pleural effusions and severe kidney disease. Initially, the patient's sister was upset with me because I upset the with all this information to her. Reportedly she was told that her heart was strong and that she did not need dialysis.      I was able to spend the time to explain things and the patient's sister was extremely appreciative of the time spent. Certainly we can consider a thoracentesis to drain the fluid. I do not see a sign of the pleural space infection having said that. Certainly the patient may not need hemodialysis but may benefit from ultrafiltration    I had a conversation with Dr. Cristal Fernandez, and I updated him with the above information. He voiced appreciation of my call and time spent with family. He plans on talking to the patient's sister as well as talking to the other providers on her care plan to see if we can come up with a good solution moving forward.        After I spoke with patient's sister for extreme amount of time, patient shook my hand stating \" Princess Natyfanny came to me tonight.'    I told her, \" I do not deserve those words but thank you \"    Total time spent with this visit probably 60-minutes    Electronically signed by George Bautista MD on 3/1/2023 at 8:59 PM

## 2023-03-03 ENCOUNTER — APPOINTMENT (OUTPATIENT)
Dept: GENERAL RADIOLOGY | Age: 63
DRG: 291 | End: 2023-03-03
Payer: COMMERCIAL

## 2023-03-03 ENCOUNTER — APPOINTMENT (OUTPATIENT)
Dept: INTERVENTIONAL RADIOLOGY/VASCULAR | Age: 63
DRG: 291 | End: 2023-03-03
Payer: COMMERCIAL

## 2023-03-03 LAB
ABSOLUTE EOS #: 0 K/UL (ref 0–0.4)
ABSOLUTE LYMPH #: 0.55 K/UL (ref 1–4.8)
ABSOLUTE MONO #: 0.27 K/UL (ref 0.1–1.3)
ANION GAP SERPL CALCULATED.3IONS-SCNC: 12 MMOL/L (ref 9–17)
BASOPHILS # BLD: 0 % (ref 0–2)
BASOPHILS ABSOLUTE: 0 K/UL (ref 0–0.2)
BNP SERPL-MCNC: 3544 PG/ML
BUN SERPL-MCNC: 78 MG/DL (ref 8–23)
CALCIUM SERPL-MCNC: 8.8 MG/DL (ref 8.6–10.4)
CHLORIDE SERPL-SCNC: 100 MMOL/L (ref 98–107)
CO2 SERPL-SCNC: 27 MMOL/L (ref 20–31)
CREAT SERPL-MCNC: 3.34 MG/DL (ref 0.5–0.9)
EOSINOPHILS RELATIVE PERCENT: 0 % (ref 0–4)
GFR SERPL CREATININE-BSD FRML MDRD: 15 ML/MIN/1.73M2
GLUCOSE BLD-MCNC: 116 MG/DL (ref 65–105)
GLUCOSE BLD-MCNC: 162 MG/DL (ref 65–105)
GLUCOSE BLD-MCNC: 164 MG/DL (ref 65–105)
GLUCOSE BLD-MCNC: 200 MG/DL (ref 65–105)
GLUCOSE BLD-MCNC: 258 MG/DL (ref 65–105)
GLUCOSE BLD-MCNC: 270 MG/DL (ref 65–105)
GLUCOSE SERPL-MCNC: 331 MG/DL (ref 70–99)
HCT VFR BLD AUTO: 33 % (ref 36–46)
HGB BLD-MCNC: 10.7 G/DL (ref 12–16)
INR PPP: 1
LYMPHOCYTES # BLD: 4 % (ref 24–44)
MCH RBC QN AUTO: 30.8 PG (ref 26–34)
MCHC RBC AUTO-ENTMCNC: 32.5 G/DL (ref 31–37)
MCV RBC AUTO: 94.9 FL (ref 80–100)
MONOCYTES # BLD: 2 % (ref 1–7)
MORPHOLOGY: ABNORMAL
PDW BLD-RTO: 14.7 % (ref 11.5–14.9)
PLATELET # BLD AUTO: 180 K/UL (ref 150–450)
PMV BLD AUTO: 9.2 FL (ref 6–12)
POTASSIUM SERPL-SCNC: 4.8 MMOL/L (ref 3.7–5.3)
PROTHROMBIN TIME: 13.3 SEC (ref 11.8–14.6)
RBC # BLD: 3.48 M/UL (ref 4–5.2)
SEG NEUTROPHILS: 94 % (ref 36–66)
SEGMENTED NEUTROPHILS ABSOLUTE COUNT: 12.88 K/UL (ref 1.3–9.1)
SODIUM SERPL-SCNC: 139 MMOL/L (ref 135–144)
WBC # BLD AUTO: 13.7 K/UL (ref 3.5–11)

## 2023-03-03 PROCEDURE — 82947 ASSAY GLUCOSE BLOOD QUANT: CPT

## 2023-03-03 PROCEDURE — 6370000000 HC RX 637 (ALT 250 FOR IP): Performed by: NURSE PRACTITIONER

## 2023-03-03 PROCEDURE — 2580000003 HC RX 258: Performed by: RADIOLOGY

## 2023-03-03 PROCEDURE — 71045 X-RAY EXAM CHEST 1 VIEW: CPT

## 2023-03-03 PROCEDURE — 2709999900 IR TUNNELED CVC PLACE WO SQ PORT/PUMP > 5 YEARS

## 2023-03-03 PROCEDURE — 0JH63XZ INSERTION OF TUNNELED VASCULAR ACCESS DEVICE INTO CHEST SUBCUTANEOUS TISSUE AND FASCIA, PERCUTANEOUS APPROACH: ICD-10-PCS | Performed by: RADIOLOGY

## 2023-03-03 PROCEDURE — 76937 US GUIDE VASCULAR ACCESS: CPT

## 2023-03-03 PROCEDURE — 2060000000 HC ICU INTERMEDIATE R&B

## 2023-03-03 PROCEDURE — 6360000002 HC RX W HCPCS: Performed by: STUDENT IN AN ORGANIZED HEALTH CARE EDUCATION/TRAINING PROGRAM

## 2023-03-03 PROCEDURE — 3209999900 FLUORO FOR SURGICAL PROCEDURES

## 2023-03-03 PROCEDURE — 2580000003 HC RX 258: Performed by: STUDENT IN AN ORGANIZED HEALTH CARE EDUCATION/TRAINING PROGRAM

## 2023-03-03 PROCEDURE — 02H633Z INSERTION OF INFUSION DEVICE INTO RIGHT ATRIUM, PERCUTANEOUS APPROACH: ICD-10-PCS | Performed by: RADIOLOGY

## 2023-03-03 PROCEDURE — 85025 COMPLETE CBC W/AUTO DIFF WBC: CPT

## 2023-03-03 PROCEDURE — 6370000000 HC RX 637 (ALT 250 FOR IP): Performed by: STUDENT IN AN ORGANIZED HEALTH CARE EDUCATION/TRAINING PROGRAM

## 2023-03-03 PROCEDURE — 6360000002 HC RX W HCPCS: Performed by: RADIOLOGY

## 2023-03-03 PROCEDURE — 83880 ASSAY OF NATRIURETIC PEPTIDE: CPT

## 2023-03-03 PROCEDURE — 36415 COLL VENOUS BLD VENIPUNCTURE: CPT

## 2023-03-03 PROCEDURE — 2580000003 HC RX 258: Performed by: INTERNAL MEDICINE

## 2023-03-03 PROCEDURE — 36558 INSERT TUNNELED CV CATH: CPT

## 2023-03-03 PROCEDURE — 2500000003 HC RX 250 WO HCPCS: Performed by: INTERNAL MEDICINE

## 2023-03-03 PROCEDURE — 99232 SBSQ HOSP IP/OBS MODERATE 35: CPT | Performed by: STUDENT IN AN ORGANIZED HEALTH CARE EDUCATION/TRAINING PROGRAM

## 2023-03-03 PROCEDURE — 6360000002 HC RX W HCPCS: Performed by: INTERNAL MEDICINE

## 2023-03-03 PROCEDURE — 80048 BASIC METABOLIC PNL TOTAL CA: CPT

## 2023-03-03 PROCEDURE — 6370000000 HC RX 637 (ALT 250 FOR IP): Performed by: RADIOLOGY

## 2023-03-03 PROCEDURE — 85610 PROTHROMBIN TIME: CPT

## 2023-03-03 PROCEDURE — 5A1D70Z PERFORMANCE OF URINARY FILTRATION, INTERMITTENT, LESS THAN 6 HOURS PER DAY: ICD-10-PCS | Performed by: INTERNAL MEDICINE

## 2023-03-03 PROCEDURE — 90935 HEMODIALYSIS ONE EVALUATION: CPT

## 2023-03-03 PROCEDURE — 77001 FLUOROGUIDE FOR VEIN DEVICE: CPT

## 2023-03-03 RX ORDER — FENTANYL CITRATE 0.05 MG/ML
INJECTION, SOLUTION INTRAMUSCULAR; INTRAVENOUS
Status: COMPLETED | OUTPATIENT
Start: 2023-03-03 | End: 2023-03-03

## 2023-03-03 RX ORDER — CARVEDILOL 3.12 MG/1
3.12 TABLET ORAL 2 TIMES DAILY
Status: DISCONTINUED | OUTPATIENT
Start: 2023-03-03 | End: 2023-03-08 | Stop reason: HOSPADM

## 2023-03-03 RX ADMIN — ALLOPURINOL 100 MG: 100 TABLET ORAL at 18:08

## 2023-03-03 RX ADMIN — GUAIFENESIN 600 MG: 600 TABLET, EXTENDED RELEASE ORAL at 09:21

## 2023-03-03 RX ADMIN — ASPIRIN 81 MG: 81 TABLET, CHEWABLE ORAL at 18:08

## 2023-03-03 RX ADMIN — FENTANYL CITRATE 50 MCG: 0.05 INJECTION, SOLUTION INTRAMUSCULAR; INTRAVENOUS at 12:00

## 2023-03-03 RX ADMIN — ACETAMINOPHEN 650 MG: 325 TABLET ORAL at 18:09

## 2023-03-03 RX ADMIN — Medication 1.6 ML: at 17:36

## 2023-03-03 RX ADMIN — Medication 3 MG: at 21:06

## 2023-03-03 RX ADMIN — TICAGRELOR 90 MG: 90 TABLET ORAL at 20:41

## 2023-03-03 RX ADMIN — INSULIN GLARGINE 55 UNITS: 100 INJECTION, SOLUTION SUBCUTANEOUS at 09:21

## 2023-03-03 RX ADMIN — GUAIFENESIN 600 MG: 600 TABLET, EXTENDED RELEASE ORAL at 20:41

## 2023-03-03 RX ADMIN — HEPARIN SODIUM 5000 UNITS: 5000 INJECTION INTRAVENOUS; SUBCUTANEOUS at 20:36

## 2023-03-03 RX ADMIN — INSULIN GLARGINE 55 UNITS: 100 INJECTION, SOLUTION SUBCUTANEOUS at 20:35

## 2023-03-03 RX ADMIN — SODIUM CHLORIDE, PRESERVATIVE FREE 10 ML: 5 INJECTION INTRAVENOUS at 20:46

## 2023-03-03 RX ADMIN — ATORVASTATIN CALCIUM 80 MG: 80 TABLET, FILM COATED ORAL at 20:42

## 2023-03-03 RX ADMIN — CEFAZOLIN 1000 MG: 1 INJECTION, POWDER, FOR SOLUTION INTRAMUSCULAR; INTRAVENOUS at 12:03

## 2023-03-03 RX ADMIN — BUMETANIDE 3 MG: 1 TABLET ORAL at 18:08

## 2023-03-03 RX ADMIN — TAMSULOSIN HYDROCHLORIDE 0.4 MG: 0.4 CAPSULE ORAL at 18:08

## 2023-03-03 RX ADMIN — Medication 60 MG: at 20:40

## 2023-03-03 RX ADMIN — Medication 1 EACH: at 12:23

## 2023-03-03 RX ADMIN — PANTOPRAZOLE SODIUM 40 MG: 40 TABLET, DELAYED RELEASE ORAL at 06:13

## 2023-03-03 RX ADMIN — CEFAZOLIN 1000 MG: 1 INJECTION, POWDER, FOR SOLUTION INTRAMUSCULAR; INTRAVENOUS at 09:12

## 2023-03-03 RX ADMIN — GABAPENTIN 300 MG: 300 CAPSULE ORAL at 18:08

## 2023-03-03 RX ADMIN — HEPARIN SODIUM 5000 UNITS: 5000 INJECTION INTRAVENOUS; SUBCUTANEOUS at 06:13

## 2023-03-03 ASSESSMENT — PAIN SCALES - GENERAL
PAINLEVEL_OUTOF10: 6
PAINLEVEL_OUTOF10: 0
PAINLEVEL_OUTOF10: 5
PAINLEVEL_OUTOF10: 0
PAINLEVEL_OUTOF10: 5

## 2023-03-03 ASSESSMENT — ENCOUNTER SYMPTOMS
ABDOMINAL PAIN: 0
VOMITING: 0
CONSTIPATION: 1
NAUSEA: 0
SHORTNESS OF BREATH: 1
COUGH: 1
WHEEZING: 0

## 2023-03-03 ASSESSMENT — PAIN DESCRIPTION - LOCATION
LOCATION: NECK
LOCATION: OTHER (COMMENT)

## 2023-03-03 NOTE — FLOWSHEET NOTE
03/02/23 2008   Treatment Team Notification   Reason for Communication Review case   Team Member Name Dr Glen Amezcua Team Role Attending Provider   Method of Communication Secure Message   Response No new orders   Notification Time 2010     Dr Tr Arcos notified of pt BG of 377. Dr Tr Arcos requests no new orders at this time.

## 2023-03-03 NOTE — PROGRESS NOTES
RN rounded on patient and she complained of feeling like her sugar was low. RN checked glucose and it was 200. Vitals taken. /79 and HR in the 40s. Oxygen saturation was 95 on 6 L. RN paged Dr. Angy Cline.

## 2023-03-03 NOTE — PROGRESS NOTES
Jason Bhatt MD/Carter Alexander MD/ Elvie Schrader MD/Dr Ayesha Rangel APRN AGACNP-BC, NP-C      Clayton Morel APRN NP-C     [id number] [employer] APRN NP-C                                           Pulmonary Progress Note    Patient - Taylor Cr   Age - 57 y.o.   - 1960  MRN - [id number]  Acct # - [de-identified]  Date of Admission - 2023  9:54 AM    Consulting Service/Physician:       Primary Care Physician: Anna Chavis, APRN - CNP    SUBJECTIVE:     Chief Complaint:   Chief Complaint   Patient presents with    Shortness of Breath     Subjective:    Cheryl Rehman continues on 6 L nasal cannula. Pulse ox is 96%. She has been afebrile. She continues on Bumex 3 mg twice a day. They attempted ultrafiltration dialysis yesterday although her fistula was not functional.  Plan is for a tunneled cath today with dialysis to follow. Edema seems slightly worse today. She states breathing feels about the same. BNP is elevated at 3544. Creatinine is worse today at 3.34. Dr. Julian Jasso did talk with patient and sister regarding holding off on any thoracentesis at this point. She is on Brilinta. If pleural effusions do not improve with ultrafiltration dialysis may need eventual thoracentesis although would need to be off Brilinta for 5 days.     VITALS  /64   Pulse 55   Temp 97.5 °F (36.4 °C) (Oral)   Resp 18   Ht 5' 5\" (1.651 m)   Wt 253 lb 1.4 oz (114.8 kg)   SpO2 97%   BMI 42.12 kg/m²   Wt Readings from Last 3 Encounters:   23 253 lb 1.4 oz (114.8 kg)   23 248 lb (112.5 kg)   23 248 lb 2.6 oz (112.6 kg)     I/O (24 Hours)    Intake/Output Summary (Last 24 hours) at 3/3/2023 1025  Last data filed at 3/3/2023 0125  Gross per 24 hour   Intake 750 ml   Output 925 ml   Net -175 ml     Ventilator:      Exam:   Physical Exam   Constitutional: Obese female sitting up in bed on 6 L nasal cannula in no distress  HENT: Unremarkable  Head: Normocephalic and atraumatic. Eyes: EOM are normal. Pupils are equal, round, and reactive to light. Neck: Neck supple. Cardiovascular:  Regular rate and rhythm. Normal heart tones. No JVD. Pulmonary/Chest: Diminished posteriorly, no wheezing, on 6 L nasal cannula with pulse ox 96%  Abdominal: Soft. Bowel sounds are normal.   Musculoskeletal: Normal range of motion. Neurological: Patient is alert and oriented to person, place, and time. Skin: Skin is warm and dry. No rash noted.    Extremities: Bilateral lower extremity edema  Infusions:      sodium chloride      dextrose       Meds:     Current Facility-Administered Medications:     insulin glargine (LANTUS) injection vial 55 Units, 55 Units, SubCUTAneous, BID, Karen Du MD, 55 Units at 03/03/23 0921    insulin lispro (HUMALOG) injection vial 0-16 Units, 0-16 Units, SubCUTAneous, 4x Daily AC & HS, Karen Du MD, 16 Units at 03/02/23 2055    albuterol (PROVENTIL) nebulizer solution 2.5 mg, 2.5 mg, Nebulization, Q4H PRN, Bailey Domínguez MD    melatonin tablet 3 mg, 3 mg, Oral, Nightly PRN, Karen Du MD, 3 mg at 03/02/23 2210    guaiFENesin (MUCINEX) extended release tablet 600 mg, 600 mg, Oral, BID, Karen Du MD, 600 mg at 03/03/23 3344    benzonatate (TESSALON) capsule 100 mg, 100 mg, Oral, TID PRN, Karen Du MD    bumetanide (BUMEX) tablet 3 mg, 3 mg, Oral, BID, AFSANEH Galeana CNP, 3 mg at 03/02/23 1636    dextromethorphan (DELSYM) 30 MG/5ML extended release liquid 60 mg, 60 mg, Oral, 2 times per day, AFSANEH Morillo CNP, 60 mg at 03/02/23 2054    sodium chloride flush 0.9 % injection 5-40 mL, 5-40 mL, IntraVENous, 2 times per day, Karen Du MD, 10 mL at 03/02/23 2056    sodium chloride flush 0.9 % injection 5-40 mL, 5-40 mL, IntraVENous, PRN, Karen Du MD    0.9 % sodium chloride infusion, , IntraVENous, PRN, Karen Du MD    magnesium sulfate 2000 mg in water 50 mL IVPB, 2,000 mg, IntraVENous, PRN, Kenna Goins MD    ondansetron (ZOFRAN-ODT) disintegrating tablet 4 mg, 4 mg, Oral, Q8H PRN, 4 mg at 02/28/23 1626 **OR** ondansetron (ZOFRAN) injection 4 mg, 4 mg, IntraVENous, Q6H PRN, Kenna Goins MD    polyethylene glycol (GLYCOLAX) packet 17 g, 17 g, Oral, Daily PRN, Kenna Goins MD    acetaminophen (TYLENOL) tablet 650 mg, 650 mg, Oral, Q6H PRN, 650 mg at 03/02/23 0848 **OR** acetaminophen (TYLENOL) suppository 650 mg, 650 mg, Rectal, Q6H PRN, Kenna Goins MD    heparin (porcine) injection 5,000 Units, 5,000 Units, SubCUTAneous, 3 times per day, Kenna Goins MD, 5,000 Units at 03/03/23 3974    allopurinol (ZYLOPRIM) tablet 100 mg, 100 mg, Oral, Daily, Kenna Goins MD, 100 mg at 03/02/23 9320    aspirin chewable tablet 81 mg, 81 mg, Oral, Daily, Kenna Goins MD, 81 mg at 03/02/23 1051    amLODIPine (NORVASC) tablet 5 mg, 5 mg, Oral, Daily, Kenna Goins MD, 5 mg at 03/02/23 8540    atorvastatin (LIPITOR) tablet 80 mg, 80 mg, Oral, Nightly, Kenna Goins MD, 80 mg at 03/02/23 2054    carvedilol (COREG) tablet 6.25 mg, 6.25 mg, Oral, BID, Kenna Goins MD, 6.25 mg at 03/02/23 2054    gabapentin (NEURONTIN) capsule 300 mg, 300 mg, Oral, Daily, Kenna Goins MD, 300 mg at 03/02/23 0839    glucose chewable tablet 16 g, 4 tablet, Oral, PRN, Kenna Goins MD    dextrose bolus 10% 125 mL, 125 mL, IntraVENous, PRN **OR** dextrose bolus 10% 250 mL, 250 mL, IntraVENous, PRN, Kenna Goins MD    glucagon (rDNA) injection 1 mg, 1 mg, SubCUTAneous, PRN, Kenna Goins MD    dextrose 10 % infusion, , IntraVENous, Continuous PRN, Kenna Goins MD    pantoprazole (PROTONIX) tablet 40 mg, 40 mg, Oral, QAM AC, Kenna Goins MD, 40 mg at 03/03/23 5959    tamsulosin (FLOMAX) capsule 0.4 mg, 0.4 mg, Oral, Daily, Kenna Goins MD, 0.4 mg at 03/02/23 1051    ticagrelor (BRILINTA) tablet 90 mg, 90 mg, Oral, BID, Kenna Goins MD, 90 mg at 03/02/23 2054    albuterol (PROVENTIL) nebulizer solution 2.5 mg, 2.5 mg, Nebulization, Q4H PRN, Kim Go MD, 2.5 mg at 02/28/23 0055    Lab Results:     Lab Results   Component Value Date    WBC 13.7 (H) 03/03/2023    HGB 10.7 (L) 03/03/2023    HCT 33.0 (L) 03/03/2023    MCV 94.9 03/03/2023     03/03/2023     Lab Results   Component Value Date    CALCIUM 8.8 03/03/2023     03/03/2023    K 4.8 03/03/2023    CO2 27 03/03/2023     03/03/2023    BUN 78 (H) 03/03/2023    CREATININE 3.34 (H) 03/03/2023       Lab Results   Component Value Date    INR 1.0 03/03/2023    PROTIME 13.3 03/03/2023       Radiology:       ASSESSMENT:       Acute CHF exacerbation; elevated BNP  History of diastolic heart failure with preserved ejection fraction  Bilateral pleural effusions  Elevated high-sensitivity troponin  Acute on chronic hypoxemic respiratory failure baseline 3 L nasal cannula currently on 6 L  History of coronary artery disease status post stent placement February 2023 on Brilinta  Stage IV chronic kidney disease previously on dialysis taken off approximately 6 months ago  Obstructive sleep apnea  Type 2 diabetes  Morbid obesity  Restrictive lung pattern on recent PFTs  History of DVT  Lymphedema-chronic  Life long non-smoker  Full Code  PLAN:   Wean oxygen as able, keep pulse ox above 89%  Diuresis per nephrology, given her cardiorenal syndrome and fluid overload she will definitely benefit from ultrafiltration dialysis  Continue to monitor labs  Note plan for tunneled catheter today followed by ultrafiltration dialysis  No current plan for thoracentesis per Dr. Nahomy Moya, will recheck chest x-ray after dialysis treatments      Electronically signed by AFSANEH Wright - FRANKY on 03/03/23     This progress note was completed using a voice transcription system. Every effort was made to ensure accuracy. However, inadvertent computerized transcription errors may be present.     Russell Maya, NP-C, MSN  [facility]

## 2023-03-03 NOTE — PROGRESS NOTES
Physical Therapy        Physical Therapy Cancel Note      DATE: 3/3/2023    NAME: Estefany Garcia  MRN: [id number]   : 1960      Patient not seen this date for Physical Therapy due to:    Pt is off the floor for a procedure, will check again tomorrow.      Electronically signed by Taran Anaya PT on 3/3/2023 at 3:29 PM

## 2023-03-03 NOTE — PROGRESS NOTES
[facility]   OCCUPATIONAL THERAPY MISSED TREATMENT NOTE   INPATIENT   Date: 3/3/23  Patient Name: Luzma Henderson       Room: [facility]/3237-  MRN: [id number]   Account #: [de-identified]    : 1960  (57 y.o.)  Gender: female   Referring Practitioner: Cadence Waters MD  Diagnosis: Hypoxia           REASON FOR MISSED TREATMENT:  Patient at testing and/or off the floor   -    procedure. Attempt made at 1417. OT will continue to follow.          [address], GARCIA/L

## 2023-03-03 NOTE — PROGRESS NOTES
Came in to see patient but patient is off the floor for procedure. Monitor rhythm strips reviewed with all sinus bradycardia in the 40s with no significant arrhythmia.     We will decrease beta-blocker dose for now and continue to monitor    Electronically signed by Rosa Leung MD on 3/3/2023 at 12:29 PM      [facility]  [phone]

## 2023-03-03 NOTE — CARE COORDINATION
ONGOING DISCHARGE PLAN:    Patient is alert and oriented x4. Spoke with patient regarding discharge plan and patient confirms that plan is still [address]. I discussed the denial for ARU. She verbalizes understanding. IR tunneled cath today  Plan for UF, waiting for nephrology to say if she needs HD slot re-established. Oral Bumex 3mg BID, BUN 78, creatinine 3.34    HR in the 40's, cardiology decreased BB    92% on 6L oxygen       Will continue to follow for additional discharge needs.     Electronically signed by Asmita Wilson RN on 3/3/2023 at 3:14 PM

## 2023-03-03 NOTE — PROGRESS NOTES
Department of Internal Medicine  Nephrology Jose Collins MD  Progress Note    Reason for consultation: Management of chronic kidney disease stage IV. Consulting physician: Mari Culp MD.    Interval history: Patient was seen and examined today and she still has dyspnea as well as peripheral edema. Patient is agreeable to ultrafiltration and has a functional left brachiocephalic AV fistula which was not successfully cannulated yesterday due to infiltration and hence she had a right IJ tunneled catheter placed today by IR    History of present illness: This is a 57 y.o. female with a significant past medical history of Obesity, type 2 diabetes mellitus with diabetic nephropathy, coronary artery disease [s/p CABG x3], systemic hypertension, heart failure with preserved ejection fraction [HFrEF] and chronic kidney disease stage IV secondary to diabetic and hypertensive nephropathy [was on hemodialysis for several months until discontinuation in August 2022 due to some recovery of renal function], who was sent in from the [address] [place] [address] for further evaluation of hypoxia. She stated that she has had progressively worsening shortness of breath and cough over the past 3 days prior to presentation and had diarrhea for 2 days. She also complains of general body aches, fatigue and dysuria. She is on chronic oxygen at the facility usually at 3 L/min flow. Oxygen saturation was in the 80% range even with increasing O2 flow to 6 L/min and hence presentation to the hospital.  Vital signs were stable at presentation with blood pressure 130/68 mmHg. Chest x-ray at presentation showed mild pulmonary vascular congestion with increased interstitial opacities at the lung bases. Laboratory studies revealed BUN/creatinine 50/2.39 mg/dL and hence nephrology consultation.     Scheduled Meds:   carvedilol  3.125 mg Oral BID    insulin glargine  55 Units SubCUTAneous BID    insulin lispro  0-16 Units SubCUTAneous 4x Daily AC & HS    guaiFENesin  600 mg Oral BID    bumetanide  3 mg Oral BID    dextromethorphan  60 mg Oral 2 times per day    sodium chloride flush  5-40 mL IntraVENous 2 times per day    heparin (porcine)  5,000 Units SubCUTAneous 3 times per day    allopurinol  100 mg Oral Daily    aspirin  81 mg Oral Daily    amLODIPine  5 mg Oral Daily    atorvastatin  80 mg Oral Nightly    gabapentin  300 mg Oral Daily    pantoprazole  40 mg Oral QAM AC    tamsulosin  0.4 mg Oral Daily    ticagrelor  90 mg Oral BID     Continuous Infusions:   sodium chloride      dextrose       Physical Exam:    VITALS:  BP (!) 113/46   Pulse (!) 49   Temp 96.8 °F (36 °C)   Resp 18   Ht 5' 5\" (1.651 m)   Wt 253 lb 1.4 oz (114.8 kg)   SpO2 92%   BMI 42.12 kg/m²   TEMPERATURE:  Current - Temp: 96.8 °F (36 °C); Max - Temp  Av.4 °F (36.3 °C)  Min: 96.8 °F (36 °C)  Max: 97.9 °F (36.6 °C)  RESPIRATIONS RANGE: Resp  Av.9  Min: 6  Max: 20  PULSE RANGE: Pulse  Av.1  Min: 46  Max: 75  BLOOD PRESSURE RANGE:  Systolic (79PRI), DMA:812 , Min:110 , QDD:080 ; Diastolic (41APQ), SQK:21, Min:41, Max:79  PULSE OXIMETRY RANGE: SpO2  Av.3 %  Min: 91 %  Max: 97 %  24HR INTAKE/OUTPUT:    Intake/Output Summary (Last 24 hours) at 3/3/2023 1540  Last data filed at 3/3/2023 0125  Gross per 24 hour   Intake 350 ml   Output 425 ml   Net -75 ml       Constitutional: alert, appears stated age, and cooperative    Skin: Skin color, texture, turgor normal. No rashes or lesions    Head: Normocephalic, without obvious abnormality, atraumatic     Cardiovascular/Edema: regular rate and rhythm, S1, S2 normal, no murmur, click, rub or gallop    Respiratory: Diminished breath sounds with bilateral basal Rales    Abdomen: soft, non-tender; bowel sounds normal; no masses,  no organomegaly    Back: symmetric, no curvature. ROM normal. No CVA tenderness.     Extremities: Bilateral pitting pedal edema 2+; left arm AV fistula with good thrill and bruit. Neuro:  Grossly normal    CBC:   Recent Labs     03/01/23  0613 03/02/23  0512 03/03/23  0547   WBC 7.2 12.7* 13.7*   HGB 10.8* 11.3* 10.7*    175 180       BMP:    Recent Labs     03/01/23  0613 03/02/23  0512 03/03/23  0547    140 139   K 5.0 5.3 4.8    101 100   CO2 28 28 27   BUN 55* 65* 78*   CREATININE 2.66* 3.00* 3.34*   GLUCOSE 355* 340* 331*       Lab Results   Component Value Date/Time    NITRU NEGATIVE 02/27/2023 03:47 PM    COLORU Yellow 02/27/2023 03:47 PM    PHUR 6.5 02/27/2023 03:47 PM    WBCUA 0 TO 2 02/27/2023 03:47 PM    RBCUA 0 TO 2 02/27/2023 03:47 PM    MUCUS 1+ 02/07/2023 03:00 PM    TRICHOMONAS NOT REPORTED 11/14/2021 02:05 AM    YEAST FEW 02/07/2023 03:00 PM    BACTERIA None 02/27/2023 03:47 PM    SPECGRAV 1.011 02/27/2023 03:47 PM    LEUKOCYTESUR NEGATIVE 02/27/2023 03:47 PM    UROBILINOGEN Normal 02/27/2023 03:47 PM    BILIRUBINUR NEGATIVE 02/27/2023 03:47 PM    GLUCOSEU NEGATIVE 02/27/2023 03:47 PM    KETUA NEGATIVE 02/27/2023 03:47 PM    AMORPHOUS 1+ 02/25/2023 03:15 AM     Urine Sodium:     Lab Results   Component Value Date/Time    JASNO 47 11/14/2021 02:04 AM     Urine Chloride:    Lab Results   Component Value Date/Time    CLUR 26 11/14/2021 02:04 AM     Urine Protein:     Lab Results   Component Value Date/Time    TPU 20 11/12/2021 10:30 AM     Urine Creatinine:     Lab Results   Component Value Date/Time    LABCREA 65.4 01/26/2023 01:10 PM     IMPRESSION/RECOMMENDATIONS:      1. Chronic kidney disease stage IV [baseline serum creatinine 2 to 3 mg/dL] renal function is stable and CKD in this patient is consistent with diabetic and hypertensive nephropathy as well as cardiorenal syndrome. She has peripheral edema and has indications for continuation of diuretics. Patient has agreed to undergo ultrafiltration and we will schedule for today using AV fistula. Plan: Continue Bumex 3 mg p.o. twice daily.     We will schedule for isolated ultrafiltration today for 3 hours with goal to remove 2 kg  Monitor urine output closely. Basic metabolic profile daily. 2.  Shortness of breath - multifactorial etiology including secondary to decompensated diastolic heart failure. Schedule for removal of 2 L of fluid with ultrafiltration today. Daily weights.  2 g sodium per day. 3.  Systemic hypertension - blood pressure control is adequate. 4.  Borderline hyperkalemia -stable    Prognosis is guarded.     MD LESIA Aguirre  Attending Nephrologist  3/3/2023 3:40 PM

## 2023-03-03 NOTE — PROGRESS NOTES
Dr. Tunde Benjamin returned call. RN notified him that the patient is SB in the 45s. He stated that he will be rounding soon and he will see her.

## 2023-03-03 NOTE — PROGRESS NOTES
HEMODIALYSIS PRE-TREATMENT NOTE    Patient Identifiers prior to treatment:   Name, birthdate and band  Isolation Required: MRSA                      Isolation Type: contact       (please document if patient is being managed as a PUI/COVID-19 patient)        Hepatitis status:                           Date Drawn                             Result  Hepatitis B Surface Antigen 03/02/2023 neg        Hepatitis B Surface Antibody 03/30/2022 pos     60.08   Hepatitis B Core Antibody            How was Hepatitis Status verified: labs and chart     Was a copy of the labs you documented provided to facility for the patient's chart: yes    Hemodialysis orders verified: yes    Access Within normal limits ( I.e. s/s of infection,...): yes     Pre-Assessment completed: yes    Pre-dialysis report received from: Sinan Davenport                      Time: 1200

## 2023-03-04 LAB
ABSOLUTE EOS #: 0.1 K/UL (ref 0–0.4)
ABSOLUTE LYMPH #: 0.9 K/UL (ref 1–4.8)
ABSOLUTE MONO #: 0.5 K/UL (ref 0.1–1.3)
ANION GAP SERPL CALCULATED.3IONS-SCNC: 11 MMOL/L (ref 9–17)
BASOPHILS # BLD: 1 % (ref 0–2)
BASOPHILS ABSOLUTE: 0.1 K/UL (ref 0–0.2)
BUN SERPL-MCNC: 85 MG/DL (ref 8–23)
CALCIUM SERPL-MCNC: 8.4 MG/DL (ref 8.6–10.4)
CHLORIDE SERPL-SCNC: 105 MMOL/L (ref 98–107)
CO2 SERPL-SCNC: 26 MMOL/L (ref 20–31)
CREAT SERPL-MCNC: 3.5 MG/DL (ref 0.5–0.9)
EOSINOPHILS RELATIVE PERCENT: 2 % (ref 0–4)
GFR SERPL CREATININE-BSD FRML MDRD: 14 ML/MIN/1.73M2
GLUCOSE BLD-MCNC: 166 MG/DL (ref 65–105)
GLUCOSE BLD-MCNC: 189 MG/DL (ref 65–105)
GLUCOSE BLD-MCNC: 306 MG/DL (ref 65–105)
GLUCOSE BLD-MCNC: 74 MG/DL (ref 65–105)
GLUCOSE SERPL-MCNC: 98 MG/DL (ref 70–99)
HCT VFR BLD AUTO: 33.7 % (ref 36–46)
HGB BLD-MCNC: 11 G/DL (ref 12–16)
LYMPHOCYTES # BLD: 14 % (ref 24–44)
MCH RBC QN AUTO: 31.3 PG (ref 26–34)
MCHC RBC AUTO-ENTMCNC: 32.6 G/DL (ref 31–37)
MCV RBC AUTO: 96 FL (ref 80–100)
MONOCYTES # BLD: 7 % (ref 1–7)
PDW BLD-RTO: 15 % (ref 11.5–14.9)
PLATELET # BLD AUTO: 150 K/UL (ref 150–450)
PMV BLD AUTO: 9 FL (ref 6–12)
POTASSIUM SERPL-SCNC: 4.4 MMOL/L (ref 3.7–5.3)
RBC # BLD: 3.51 M/UL (ref 4–5.2)
SEG NEUTROPHILS: 76 % (ref 36–66)
SEGMENTED NEUTROPHILS ABSOLUTE COUNT: 5.2 K/UL (ref 1.3–9.1)
SODIUM SERPL-SCNC: 142 MMOL/L (ref 135–144)
WBC # BLD AUTO: 6.7 K/UL (ref 3.5–11)

## 2023-03-04 PROCEDURE — 6370000000 HC RX 637 (ALT 250 FOR IP): Performed by: STUDENT IN AN ORGANIZED HEALTH CARE EDUCATION/TRAINING PROGRAM

## 2023-03-04 PROCEDURE — 2580000003 HC RX 258: Performed by: STUDENT IN AN ORGANIZED HEALTH CARE EDUCATION/TRAINING PROGRAM

## 2023-03-04 PROCEDURE — 6370000000 HC RX 637 (ALT 250 FOR IP): Performed by: INTERNAL MEDICINE

## 2023-03-04 PROCEDURE — 90935 HEMODIALYSIS ONE EVALUATION: CPT

## 2023-03-04 PROCEDURE — 80048 BASIC METABOLIC PNL TOTAL CA: CPT

## 2023-03-04 PROCEDURE — 85025 COMPLETE CBC W/AUTO DIFF WBC: CPT

## 2023-03-04 PROCEDURE — 6360000002 HC RX W HCPCS: Performed by: STUDENT IN AN ORGANIZED HEALTH CARE EDUCATION/TRAINING PROGRAM

## 2023-03-04 PROCEDURE — 6370000000 HC RX 637 (ALT 250 FOR IP): Performed by: NURSE PRACTITIONER

## 2023-03-04 PROCEDURE — 82947 ASSAY GLUCOSE BLOOD QUANT: CPT

## 2023-03-04 PROCEDURE — 36415 COLL VENOUS BLD VENIPUNCTURE: CPT

## 2023-03-04 PROCEDURE — 2060000000 HC ICU INTERMEDIATE R&B

## 2023-03-04 PROCEDURE — 99232 SBSQ HOSP IP/OBS MODERATE 35: CPT | Performed by: STUDENT IN AN ORGANIZED HEALTH CARE EDUCATION/TRAINING PROGRAM

## 2023-03-04 RX ORDER — DOCUSATE SODIUM 100 MG/1
100 CAPSULE, LIQUID FILLED ORAL 2 TIMES DAILY
Status: DISCONTINUED | OUTPATIENT
Start: 2023-03-04 | End: 2023-03-08 | Stop reason: HOSPADM

## 2023-03-04 RX ORDER — BUSPIRONE HYDROCHLORIDE 5 MG/1
7.5 TABLET ORAL 3 TIMES DAILY
Status: DISCONTINUED | OUTPATIENT
Start: 2023-03-04 | End: 2023-03-08 | Stop reason: HOSPADM

## 2023-03-04 RX ORDER — POLYETHYLENE GLYCOL 3350 17 G/17G
17 POWDER, FOR SOLUTION ORAL DAILY
Status: DISCONTINUED | OUTPATIENT
Start: 2023-03-04 | End: 2023-03-08 | Stop reason: HOSPADM

## 2023-03-04 RX ORDER — MIDODRINE HYDROCHLORIDE 10 MG/1
10 TABLET ORAL
Status: COMPLETED | OUTPATIENT
Start: 2023-03-04 | End: 2023-03-04

## 2023-03-04 RX ADMIN — BUMETANIDE 3 MG: 1 TABLET ORAL at 16:33

## 2023-03-04 RX ADMIN — Medication 3 MG: at 21:53

## 2023-03-04 RX ADMIN — DOCUSATE SODIUM 100 MG: 100 CAPSULE, LIQUID FILLED ORAL at 21:54

## 2023-03-04 RX ADMIN — MIDODRINE HYDROCHLORIDE 10 MG: 10 TABLET ORAL at 11:50

## 2023-03-04 RX ADMIN — TICAGRELOR 90 MG: 90 TABLET ORAL at 21:54

## 2023-03-04 RX ADMIN — GUAIFENESIN 600 MG: 600 TABLET, EXTENDED RELEASE ORAL at 21:54

## 2023-03-04 RX ADMIN — TAMSULOSIN HYDROCHLORIDE 0.4 MG: 0.4 CAPSULE ORAL at 08:47

## 2023-03-04 RX ADMIN — ACETAMINOPHEN 650 MG: 325 TABLET ORAL at 22:24

## 2023-03-04 RX ADMIN — ATORVASTATIN CALCIUM 80 MG: 80 TABLET, FILM COATED ORAL at 21:54

## 2023-03-04 RX ADMIN — INSULIN GLARGINE 55 UNITS: 100 INJECTION, SOLUTION SUBCUTANEOUS at 21:46

## 2023-03-04 RX ADMIN — Medication 60 MG: at 08:47

## 2023-03-04 RX ADMIN — SODIUM CHLORIDE, PRESERVATIVE FREE 10 ML: 5 INJECTION INTRAVENOUS at 08:49

## 2023-03-04 RX ADMIN — HEPARIN SODIUM 5000 UNITS: 5000 INJECTION INTRAVENOUS; SUBCUTANEOUS at 14:12

## 2023-03-04 RX ADMIN — BUMETANIDE 3 MG: 1 TABLET ORAL at 14:12

## 2023-03-04 RX ADMIN — GUAIFENESIN 600 MG: 600 TABLET, EXTENDED RELEASE ORAL at 08:47

## 2023-03-04 RX ADMIN — ACETAMINOPHEN 650 MG: 325 TABLET ORAL at 08:52

## 2023-03-04 RX ADMIN — GABAPENTIN 300 MG: 300 CAPSULE ORAL at 08:47

## 2023-03-04 RX ADMIN — BUSPIRONE HYDROCHLORIDE 7.5 MG: 5 TABLET ORAL at 21:54

## 2023-03-04 RX ADMIN — TICAGRELOR 90 MG: 90 TABLET ORAL at 08:47

## 2023-03-04 RX ADMIN — ALLOPURINOL 100 MG: 100 TABLET ORAL at 08:48

## 2023-03-04 RX ADMIN — Medication 60 MG: at 21:53

## 2023-03-04 RX ADMIN — ASPIRIN 81 MG: 81 TABLET, CHEWABLE ORAL at 08:47

## 2023-03-04 RX ADMIN — HEPARIN SODIUM 5000 UNITS: 5000 INJECTION INTRAVENOUS; SUBCUTANEOUS at 21:55

## 2023-03-04 RX ADMIN — INSULIN LISPRO 12 UNITS: 100 INJECTION, SOLUTION INTRAVENOUS; SUBCUTANEOUS at 21:47

## 2023-03-04 RX ADMIN — HEPARIN SODIUM 5000 UNITS: 5000 INJECTION INTRAVENOUS; SUBCUTANEOUS at 05:04

## 2023-03-04 RX ADMIN — POLYETHYLENE GLYCOL 3350 17 G: 17 POWDER, FOR SOLUTION ORAL at 18:23

## 2023-03-04 RX ADMIN — PANTOPRAZOLE SODIUM 40 MG: 40 TABLET, DELAYED RELEASE ORAL at 05:04

## 2023-03-04 ASSESSMENT — ENCOUNTER SYMPTOMS
ABDOMINAL PAIN: 0
WHEEZING: 0
VOMITING: 0
CONSTIPATION: 1
NAUSEA: 0

## 2023-03-04 ASSESSMENT — PAIN SCALES - GENERAL
PAINLEVEL_OUTOF10: 0
PAINLEVEL_OUTOF10: 3
PAINLEVEL_OUTOF10: 6
PAINLEVEL_OUTOF10: 5
PAINLEVEL_OUTOF10: 3

## 2023-03-04 ASSESSMENT — PAIN DESCRIPTION - LOCATION
LOCATION: FOOT
LOCATION: NECK

## 2023-03-04 ASSESSMENT — PAIN DESCRIPTION - ORIENTATION: ORIENTATION: RIGHT;LEFT

## 2023-03-04 NOTE — PROGRESS NOTES
Patient was not able to tolerate the collection of ABG with two attempts per respiratory therapist. At this time, patient has had a steady oxygen saturation above 94. No distress and no complain of pain. Writer will continue to monitor and assess patient through the rest of the shift.

## 2023-03-04 NOTE — PLAN OF CARE
Problem: Discharge Planning  Goal: Discharge to home or other facility with appropriate resources  3/4/2023 0315 by Richard Mittal RN  Outcome: Progressing  Flowsheets (Taken 3/4/2023 0315)  Discharge to home or other facility with appropriate resources:   Identify barriers to discharge with patient and caregiver   Arrange for interpreters to assist at discharge as needed     Problem: Safety - Adult  Goal: Free from fall injury  3/4/2023 0315 by Richard Mittal RN  Outcome: Progressing  Note: Pt assessed as a fall risk this shift. Remains free from falls and accidental injury at this time. Fall precautions in place, including falling star sign and fall risk band on pt. Floor free from obstacles, and bed is locked and in lowest position. Adequate lighting provided. Pt encouraged to call  for any need. Bed alarm activated. Will continue to monitor needs during hourly rounding, and reinforce education on use of call light. Problem: Pain  Goal: Verbalizes/displays adequate comfort level or baseline comfort level  Outcome: Progressing  Note: Pt medicated with pain medication prn. Assessed all pain characteristics including level, type, location, frequency, and onset. Non-pharmacologic interventions offered to pt as well. Pt states pain is tolerable at this time. Will continue to monitor.

## 2023-03-04 NOTE — PROGRESS NOTES
Patient not available. Will try again later. Thank you for allowing me to participate in the care of this patient, please do not hesitate to call if you have any questions. Judie Olea, [id number] [facility]  [facility]. [facility]  [phone]

## 2023-03-04 NOTE — PROGRESS NOTES
Pulmonary Progress Note  [facility]      Patient - Estefany Garcia,  Age - 62 y.o.    - 1960      Room Number - -01   N -  [id number]   [facility] # - [id number]  Date of Admission -  2023  9:54 AM        Consulting Service/Physician   Consulting - Pirscila Rob MD  Primary Care Physician - Zulma Payne, APRN - CNP     SUBJECTIVE   She has no complaints today other than her cough MI: Currently on room air about to go to dialysis.    OBJECTIVE   VITALS    height is 5' 5\" (1.651 m) and weight is 248 lb 3.8 oz (112.6 kg). Her temperature is 96.8 °F (36 °C). Her blood pressure is 108/47 (abnormal) and her pulse is 46 (abnormal). Her respiration is 18 and oxygen saturation is 96%.     Body mass index is 41.31 kg/m².  Temperature Range: Temp: 96.8 °F (36 °C) Temp  Av.4 °F (36.3 °C)  Min: 96.8 °F (36 °C)  Max: 98.5 °F (36.9 °C)  BP Range:  Systolic (24hrs), Av , Min:108 , Max:154     Diastolic (24hrs), Av, Min:41, Max:78    Pulse Range: Pulse  Av.1  Min: 46  Max: 57  Respiration Range: Resp  Avg: 15.2  Min: 6  Max: 19  Current Pulse Ox::  SpO2: 96 %  24HR Pulse Ox Range:  SpO2  Av.4 %  Min: 91 %  Max: 96 %  Oxygen Amount and Delivery: O2 Flow Rate (L/min): 6 L/min    Wt Readings from Last 3 Encounters:   23 248 lb 3.8 oz (112.6 kg)   23 248 lb (112.5 kg)   23 248 lb 2.6 oz (112.6 kg)       I/O (24 Hours)    Intake/Output Summary (Last 24 hours) at 3/4/2023 1132  Last data filed at 3/4/2023 0500  Gross per 24 hour   Intake 940 ml   Output 4650 ml   Net -3710 ml       EXAM     General Appearance  Awake, alert, oriented, in no acute distress  HEENT - normocephalic, atraumatic. []  Mallampati  [x] Crowded airway   [x] Macroglossia  []  Retrognathia  [] Micrognathia  []  Normal tongue size []  Normal Bite  [] Danilo sign positive    Neck - Supple,  trachea midline   Lungs -diminished right side no  tenderness  Cardiovascular - Heart sounds are normal.  Regular rate and rhythm   Abdomen - Soft, nontender, nondistended, no masses or organomegaly  Neurologic - There are no focal motor or sensory deficits  Skin - No bruising or bleeding  Extremities - No clubbing, cyanosis, edema    MEDS      carvedilol  3.125 mg Oral BID    insulin glargine  55 Units SubCUTAneous BID    insulin lispro  0-16 Units SubCUTAneous 4x Daily AC & HS    guaiFENesin  600 mg Oral BID    bumetanide  3 mg Oral BID    dextromethorphan  60 mg Oral 2 times per day    sodium chloride flush  5-40 mL IntraVENous 2 times per day    heparin (porcine)  5,000 Units SubCUTAneous 3 times per day    allopurinol  100 mg Oral Daily    aspirin  81 mg Oral Daily    amLODIPine  5 mg Oral Daily    atorvastatin  80 mg Oral Nightly    gabapentin  300 mg Oral Daily    pantoprazole  40 mg Oral QAM AC    tamsulosin  0.4 mg Oral Daily    ticagrelor  90 mg Oral BID      sodium chloride      dextrose       anticoagulant sodium citrate, anticoagulant sodium citrate, albuterol, melatonin, benzonatate, sodium chloride flush, sodium chloride, magnesium sulfate, ondansetron **OR** ondansetron, polyethylene glycol, acetaminophen **OR** acetaminophen, glucose, dextrose bolus **OR** dextrose bolus, glucagon (rDNA), dextrose, albuterol    LABS   CBC   Recent Labs     03/04/23  0552   WBC 6.7   HGB 11.0*   HCT 33.7*   MCV 96.0        BMP:   Lab Results   Component Value Date/Time     03/04/2023 05:52 AM    K 4.4 03/04/2023 05:52 AM     03/04/2023 05:52 AM    CO2 26 03/04/2023 05:52 AM    BUN 85 03/04/2023 05:52 AM    LABALBU 3.0 02/24/2023 06:15 AM    CREATININE 3.50 03/04/2023 05:52 AM    CALCIUM 8.4 03/04/2023 05:52 AM    GFRAA 27 09/06/2022 04:40 PM    LABGLOM 14 03/04/2023 05:52 AM     ABGs:  Lab Results   Component Value Date/Time    PHART 7.429 04/24/2022 01:42 PM    PO2ART 47.6 04/24/2022 01:42 PM    NYH3MHD 45.9 04/24/2022 01:42 PM      Lab Results Component Value Date/Time    MODE CONDITION NO LONGER WARRANTS 04/06/2021 07:49 PM     Ionized Calcium:  No results found for: IONCA  Magnesium:    Lab Results   Component Value Date/Time    MG 2.2 02/12/2023 06:07 AM     Phosphorus:    Lab Results   Component Value Date/Time    PHOS 4.0 01/27/2023 03:09 AM        LIVER PROFILE No results for input(s): AST, ALT, LIPASE, BILIDIR, BILITOT, ALKPHOS in the last 72 hours. Invalid input(s):   AMYLASE,  ALB  INR   Recent Labs     03/03/23  0547   INR 1.0     PTT   Lab Results   Component Value Date    APTT 27.5 11/10/2021         RADIOLOGY     Chest x-ray done yesterday shows persistent right pleural effusion        ASSESSMENT/PLAN       Volume overload/CHF  Bilateral pleural effusions right greater than left  Acute hypoxic respiratory failure, currently on 6 L nasal cannula, baseline is 3 L  Chronic kidney disease stage IV  ERICK  CHF with ejection fraction  Type 2 diabetes  Morbid obesity  Restrictive pattern on PFTs  History of DVT with chronic lymphedema  Non-smoker  Full code    Aggressively wean down oxygen, I did decrease her down to 4 L keep saturations greater than 88%  Continue antitussive medication, cough is improving  Strongly suggest reevaluation of antihypertensives as she consistently gets lightheaded especially on dialysis  Would like to avoid thoracentesis if at all possible    Electronically signed by Germaine Carter MD on 3/4/2023 at 11:32 AM

## 2023-03-04 NOTE — PROGRESS NOTES
Department of Internal Medicine  Nephrology Raul Stern MD  Progress Note    Reason for consultation: Management of chronic kidney disease stage IV. Consulting physician: Jesus Mercado MD.    Interval history: Patient was seen and examined on hemodialysis today receiving a second treatment-patient has a functional left brachiocephalic AV fistula which was not successfully cannulated yesterday due to infiltration and hence she had a right IJ tunneled catheter placed. History of present illness: This is a 57 y.o. female with a significant past medical history of Obesity, type 2 diabetes mellitus with diabetic nephropathy, coronary artery disease [s/p CABG x3], systemic hypertension, heart failure with preserved ejection fraction [HFrEF] and chronic kidney disease stage IV secondary to diabetic and hypertensive nephropathy [was on hemodialysis for several months until discontinuation in August 2022 due to some recovery of renal function], who was sent in from the [address] [place] [address] for further evaluation of hypoxia. She stated that she has had progressively worsening shortness of breath and cough over the past 3 days prior to presentation and had diarrhea for 2 days. She also complains of general body aches, fatigue and dysuria. She is on chronic oxygen at the facility usually at 3 L/min flow. Oxygen saturation was in the 80% range even with increasing O2 flow to 6 L/min and hence presentation to the hospital.  Vital signs were stable at presentation with blood pressure 130/68 mmHg. Chest x-ray at presentation showed mild pulmonary vascular congestion with increased interstitial opacities at the lung bases. Laboratory studies revealed BUN/creatinine 50/2.39 mg/dL and hence nephrology consultation.     Scheduled Meds:   carvedilol  3.125 mg Oral BID    insulin glargine  55 Units SubCUTAneous BID    insulin lispro  0-16 Units SubCUTAneous 4x Daily AC & HS    guaiFENesin  600 mg Oral BID bumetanide  3 mg Oral BID    dextromethorphan  60 mg Oral 2 times per day    sodium chloride flush  5-40 mL IntraVENous 2 times per day    heparin (porcine)  5,000 Units SubCUTAneous 3 times per day    allopurinol  100 mg Oral Daily    aspirin  81 mg Oral Daily    amLODIPine  5 mg Oral Daily    atorvastatin  80 mg Oral Nightly    gabapentin  300 mg Oral Daily    pantoprazole  40 mg Oral QAM AC    tamsulosin  0.4 mg Oral Daily    ticagrelor  90 mg Oral BID     Continuous Infusions:   sodium chloride      dextrose       Physical Exam:    VITALS:  BP (!) 113/43   Pulse 55   Temp 96.8 °F (36 °C)   Resp 18   Ht 5' 5\" (1.651 m)   Wt 248 lb 3.8 oz (112.6 kg)   SpO2 96%   BMI 41.31 kg/m²   TEMPERATURE:  Current - Temp: 96.8 °F (36 °C); Max - Temp  Av.4 °F (36.3 °C)  Min: 96.8 °F (36 °C)  Max: 98.5 °F (36.9 °C)  RESPIRATIONS RANGE: Resp  Avg: 15.2  Min: 6  Max: 19  PULSE RANGE: Pulse  Av.4  Min: 46  Max: 57  BLOOD PRESSURE RANGE:  Systolic (57QYY), QAH:520 , Min:109 , ENMA:814 ; Diastolic (79TTV), OXY:20, Min:41, Max:78  PULSE OXIMETRY RANGE: SpO2  Av.4 %  Min: 91 %  Max: 96 %  24HR INTAKE/OUTPUT:    Intake/Output Summary (Last 24 hours) at 3/4/2023 1123  Last data filed at 3/4/2023 0500  Gross per 24 hour   Intake 940 ml   Output 4650 ml   Net -3710 ml       Constitutional: alert, appears stated age, and cooperative    Skin: Skin color, texture, turgor normal. No rashes or lesions    Head: Normocephalic, without obvious abnormality, atraumatic     Cardiovascular/Edema: regular rate and rhythm, S1, S2 normal, no murmur, click, rub or gallop    Respiratory: Diminished breath sounds with bilateral basal Rales    Abdomen: soft, non-tender; bowel sounds normal; no masses,  no organomegaly    Back: symmetric, no curvature. ROM normal. No CVA tenderness. Extremities: Bilateral pitting pedal edema 2+; left arm AV fistula with good thrill and bruit.     Neuro:  Grossly normal    CBC:   Recent Labs 03/02/23  0512 03/03/23  0547 03/04/23  0552   WBC 12.7* 13.7* 6.7   HGB 11.3* 10.7* 11.0*    180 150       BMP:    Recent Labs     03/02/23  0512 03/03/23  0547 03/04/23  0552    139 142   K 5.3 4.8 4.4    100 105   CO2 28 27 26   BUN 65* 78* 85*   CREATININE 3.00* 3.34* 3.50*   GLUCOSE 340* 331* 98       Lab Results   Component Value Date/Time    NITRU NEGATIVE 02/27/2023 03:47 PM    COLORU Yellow 02/27/2023 03:47 PM    PHUR 6.5 02/27/2023 03:47 PM    WBCUA 0 TO 2 02/27/2023 03:47 PM    RBCUA 0 TO 2 02/27/2023 03:47 PM    MUCUS 1+ 02/07/2023 03:00 PM    TRICHOMONAS NOT REPORTED 11/14/2021 02:05 AM    YEAST FEW 02/07/2023 03:00 PM    BACTERIA None 02/27/2023 03:47 PM    SPECGRAV 1.011 02/27/2023 03:47 PM    LEUKOCYTESUR NEGATIVE 02/27/2023 03:47 PM    UROBILINOGEN Normal 02/27/2023 03:47 PM    BILIRUBINUR NEGATIVE 02/27/2023 03:47 PM    GLUCOSEU NEGATIVE 02/27/2023 03:47 PM    KETUA NEGATIVE 02/27/2023 03:47 PM    AMORPHOUS 1+ 02/25/2023 03:15 AM     Urine Sodium:     Lab Results   Component Value Date/Time    JASON 47 11/14/2021 02:04 AM     Urine Chloride:    Lab Results   Component Value Date/Time    CLUR 26 11/14/2021 02:04 AM     Urine Protein:     Lab Results   Component Value Date/Time    TPU 20 11/12/2021 10:30 AM     Urine Creatinine:     Lab Results   Component Value Date/Time    LABCREA 65.4 01/26/2023 01:10 PM     IMPRESSION/RECOMMENDATIONS:      1. Chronic kidney disease stage IV [baseline serum creatinine 2 to 3 mg/dL] renal function is stable and CKD in this patient is consistent with diabetic and hypertensive nephropathy as well as cardiorenal syndrome. She has peripheral edema and has indications for continuation of diuretics. Status post tunnel catheter placed on 3/3/2023 and receiving second treatment of dialysis today   plan: Continue Bumex 3 mg p.o. twice daily. UF goal decreased to 1.5 kg today.   Midodrine 10 mg 4 systolic less than 638  Monitor urine output closely. Basic metabolic profile daily. 2.  Shortness of breath - multifactorial etiology including secondary to decompensated diastolic heart failure. Schedule for removal of 2 L of fluid with ultrafiltration today. Daily weights.  2 g sodium per day. 3.  Systemic hypertension - blood pressure control is adequate. 4.  Borderline hyperkalemia -stable    Prognosis is guarded.     Patient will require outpatient dialysis given risk for uremic symptoms and fluid overload  MD LESIA Aj  Attending Nephrologist  3/4/2023 11:23 AM

## 2023-03-04 NOTE — PROGRESS NOTES
[facility]  Family Medicine        Progress Note      Date:   3/3/2023  Patient name:  Estefany Garcia  Date of admission:  2/27/2023  9:54 AM  MRN:   [id number]  YOB: 1960    SUBJECTIVE:     Patient was seen and examined at bedside patient, still has dyspnea and cough, her fistula did not function and pt got a dialysis catheter placed.  Pt had ultrafiltration treatment, 2L removed, tolerated well. Consultant notes, labs & imaging reviewed. Case was discussed with nursing staff.       Brief HPI    Respiratory failure secondary to CHF exacerbation, renal failure, requiring ultrafiltration    Review of Systems   Constitutional:  Negative for chills, diaphoresis and fever.   Respiratory:  Positive for cough and shortness of breath. Negative for wheezing.    Cardiovascular:  Positive for leg swelling. Negative for palpitations.   Gastrointestinal:  Positive for constipation. Negative for abdominal pain, nausea and vomiting.   Genitourinary:  Negative for difficulty urinating and dysuria.   Neurological:  Negative for dizziness, light-headedness and headaches.       OBJECTIVE:     /77   Pulse 54   Temp 97.5 °F (36.4 °C)   Resp 18   Ht 5' 5\" (1.651 m)   Wt 253 lb 1.4 oz (114.8 kg)   SpO2 92%   BMI 42.12 kg/m²      Physical Exam  Vitals and nursing note reviewed.   Eyes:      Extraocular Movements: Extraocular movements intact.      Conjunctiva/sclera: Conjunctivae normal.   Cardiovascular:      Rate and Rhythm: Normal rate and regular rhythm.      Pulses: Normal pulses.      Heart sounds: Normal heart sounds.   Pulmonary:      Breath sounds: Rales present. No wheezing.      Comments: Fine rales at bases  Abdominal:      General: There is no distension.      Palpations: Abdomen is soft.      Tenderness: There is no abdominal tenderness. There is no guarding.   Musculoskeletal:      Right lower leg: Edema present.      Left lower leg: Edema present.   Neurological:       General: No focal deficit present. Mental Status: She is alert and oriented to person, place, and time. Intake/Output:    Intake/Output Summary (Last 24 hours) at 3/3/2023 2020  Last data filed at 3/3/2023 1737  Gross per 24 hour   Intake 850 ml   Output 2925 ml   Net -2075 ml       Laboratory Testing:  CBC:   Recent Labs     03/03/23  0547   WBC 13.7*   HGB 10.7*          BMP:    Recent Labs     03/01/23  0613 03/02/23  0512 03/03/23  0547    140 139   K 5.0 5.3 4.8    101 100   CO2 28 28 27   BUN 55* 65* 78*   CREATININE 2.66* 3.00* 3.34*   GLUCOSE 355* 340* 331*       Magnesium:   Lab Results   Component Value Date/Time    MG 2.2 02/12/2023 06:07 AM     Phosphorus:   Lab Results   Component Value Date/Time    PHOS 4.0 01/27/2023 03:09 AM     Ionized Calcium:   Lab Results   Component Value Date/Time    CAION 1.04 04/18/2022 12:27 PM      PT/INR:    Lab Results   Component Value Date/Time    PROTIME 13.3 03/03/2023 05:47 AM    PROTIME 16.4 08/27/2015 12:00 AM    PROTIME 16.4 08/27/2015 12:00 AM    INR 1.0 03/03/2023 05:47 AM     PTT:    Lab Results   Component Value Date/Time    APTT 27.5 11/10/2021 03:50 PM       ASSESSMENT/PLAN     Principal Problem:    Acute respiratory failure with hypoxia ([facility])  Active Problems:    Type 2 diabetes mellitus with hyperglycemia, with long-term current use of insulin ([facility])    Acute on chronic heart failure, unspecified heart failure type ([facility])    Diarrhea of presumed infectious origin    CKD (chronic kidney disease) stage 4, GFR 15-29 ml/min ([facility])  Resolved Problems:    * No resolved hospital problems. *      1. Ultrafiltration done today, will monitor breathing status  2. Lantus 55 units twice daily with a high dose scale  3.  BB dose reduced due to bradycardia  4. There is a sepsis warning however I do not have high suspicion for sepsis at this time, will continue to monitor  6. Continue amlodipine, coreg, Bumex  6.  Cardio, pulm, nephro on board    Josy Mendez MD   3/3/2023 8:20 PM

## 2023-03-04 NOTE — PROGRESS NOTES
HEMODIALYSIS POST TREATMENT NOTE    Treatment time ordered: 180    Actual treatment time: 180    UltraFiltration Goal: 1500  UltraFiltration Removed: 1500      Pre Treatment weight: 112.6   Post Treatment weight: 111.1  Estimated Dry Weight: na    Access used:     Central Venous Catheter:          Tunneled or Non-tunneled: tunneled           Site: right chest          Access Flow: good      Internal Access:       AV Fistula or AV Graft: na         Site: na       Access Flow: na       Sign and symptoms of infection: none       If YES: na    Medications or blood products given: midodrine    Chronic outpatient schedule: na    Chronic outpatient unit: na    Summary of response to treatment: the pt did well on treatment    Explain if orders NOT met, was physician notified:trinh      ACES flowsheet faxed to patient unit/ placed in patient chart: yes    Post assessment completed: yes    Report given to: [facility] [place] documented Safety Checks include the followin) Access and face visible at all times. 2) All connections and blood lines are secure with no kinks. 3) NVL alarm engaged. 4) Hemosafe device applied (if applicable). 5) No collapse of Arterial or Venous blood chambers. 6) All blood lines / pump segments in the air detectors.

## 2023-03-04 NOTE — PROGRESS NOTES
[facility]   Family Medicine        Progress Note      Date:   3/4/2023  Patient name:  Scott Mccarthy  Date of admission:  2/27/2023  9:54 AM  MRN:   [id number]  YOB: 1960      Brief HPI    Respiratory failure secondary to CHF exacerbation, renal failure, requiring ultrafiltration    ASSESSMENT/PLAN     Hospital Problems             Last Modified POA    * (Principal) Acute respiratory failure with hypoxia ([address] 2/27/2023 Yes    Type 2 diabetes mellitus with hyperglycemia, with long-term current use of insulin ([address] 2/27/2023 Yes    Acute on chronic heart failure, unspecified heart failure type ([address] 2/27/2023 Yes    Diarrhea of presumed infectious origin 2/27/2023 Yes    CKD (chronic kidney disease) stage 4, GFR 15-29 ml/min ([address] 2/27/2023 Yes         1. Ultrafiltration done again today, breathing is improving  2. Lantus 55 units twice daily with a high dose scale  3.  BB dose reduced due to bradycardia, hold amlodipine due to low BP  4. Continue bumex  6. Cardio, pulm, nephro on board  7. Miralax, colace daily, will start senna if no BM by tomorrow    DVT:heparin subcu      SUBJECTIVE:     Patient was seen and examined at bedside patient, cough and dyspnea have improved s/p 2 rounds of dialysis with ultrafiltration. Pt is tearful and concerned about ongoing dialysis treatment due to history of cognitive decline s/p dialysis in the past. Pt has not passed BM in several days. Consultant notes, labs & imaging reviewed. Review of Systems   Constitutional:  Negative for chills, diaphoresis and fever. Respiratory:  Negative for wheezing. Dyspnea and cough have improved   Cardiovascular:  Positive for leg swelling. Negative for palpitations. Gastrointestinal:  Positive for constipation. Negative for abdominal pain, nausea and vomiting. Genitourinary:  Negative for difficulty urinating and dysuria.    Neurological:  Negative for dizziness, light-headedness and headaches. OBJECTIVE:     BP (!) 108/47   Pulse (!) 46   Temp 96.8 °F (36 °C)   Resp 18   Ht 5' 5\" (1.651 m)   Wt 248 lb 3.8 oz (112.6 kg)   SpO2 96%   BMI 41.31 kg/m²      Physical Exam  Vitals and nursing note reviewed. Eyes:      Extraocular Movements: Extraocular movements intact. Conjunctiva/sclera: Conjunctivae normal.   Cardiovascular:      Rate and Rhythm: Normal rate and regular rhythm. Pulses: Normal pulses. Heart sounds: Normal heart sounds. Pulmonary:      Breath sounds: Rales present. No wheezing. Comments: Fine rales at bases, improving  Abdominal:      General: There is no distension. Palpations: Abdomen is soft. Tenderness: There is no abdominal tenderness. There is no guarding. Musculoskeletal:      Right lower leg: Edema present. Left lower leg: Edema present. Comments: Edema is improving   Neurological:      General: No focal deficit present. Mental Status: She is alert and oriented to person, place, and time.         Intake/Output:    Intake/Output Summary (Last 24 hours) at 3/4/2023 1136  Last data filed at 3/4/2023 0500  Gross per 24 hour   Intake 940 ml   Output 4650 ml   Net -3710 ml       Laboratory Testing:  CBC:   Recent Labs     03/04/23  0552   WBC 6.7   HGB 11.0*          BMP:    Recent Labs     03/02/23  0512 03/03/23  0547 03/04/23  0552    139 142   K 5.3 4.8 4.4    100 105   CO2 28 27 26   BUN 65* 78* 85*   CREATININE 3.00* 3.34* 3.50*   GLUCOSE 340* 331* 98       Magnesium:   Lab Results   Component Value Date/Time    MG 2.2 02/12/2023 06:07 AM     Phosphorus:   Lab Results   Component Value Date/Time    PHOS 4.0 01/27/2023 03:09 AM     Ionized Calcium:   Lab Results   Component Value Date/Time    CAION 1.04 04/18/2022 12:27 PM      PT/INR:    Lab Results   Component Value Date/Time    PROTIME 13.3 03/03/2023 05:47 AM    PROTIME 16.4 08/27/2015 12:00 AM    PROTIME 16.4 08/27/2015 12:00 AM    INR 1.0 03/03/2023 05:47 AM     PTT:    Lab Results   Component Value Date/Time    APTT 27.5 11/10/2021 03:50 PM           Azul Encarnacion MD   3/4/2023 11:36 AM

## 2023-03-04 NOTE — FLOWSHEET NOTE
03/03/23 1737   Vital Signs   BP (!) 130/42   Heart Rate (!) 48   Resp 16   Post-Hemodialysis Assessment   Post-Treatment Procedures Blood returned;Catheter capped, clamped with Citrate x 2 ports   Machine Disinfection Process Acid/Vinegar Clean;Heat Disinfect;Exterior Machine Disinfection   Rinseback Volume (ml) 250 ml   Dialyzer Clearance Lightly streaked   Duration of Treatment (minutes) 180 minutes   Hemodialysis Intake (ml) 500 ml   Hemodialysis Output (ml) 2500 ml   NET Removed (ml) 2000   Tolerated Treatment Fair   Patient Response to Treatment Patient completed 3 hour Ultrafiltration treatment, patient had a net fluid removal of 2000 ml, pt did not require any medications during tx for BP support, HD CVC maintained good blood flow throughout treatment, post tx vitals stable, post tx report given to patient's primary nurse, DEDE Roche.   Time Off 1735   Patient Disposition Return to room

## 2023-03-04 NOTE — PROGRESS NOTES
Patient's oxygen saturation dropped to 85% on her 6 L NC. Dr. Krystina Frausto notified. He said she was okay for salter but oxygen level returned to 98% on 6L. He gave orders for a CXR and ABG d/t change in status.

## 2023-03-04 NOTE — PLAN OF CARE
Problem: Discharge Planning  Goal: Discharge to home or other facility with appropriate resources  3/4/2023 1652 by Faisal Doll RN  Outcome: Progressing  3/4/2023 0315 by Marion Tinoco RN  Outcome: Progressing  Flowsheets (Taken 3/4/2023 0315)  Discharge to home or other facility with appropriate resources:   Identify barriers to discharge with patient and caregiver   Arrange for interpreters to assist at discharge as needed     Problem: Safety - Adult  Goal: Free from fall injury  3/4/2023 1652 by Faisal Doll RN  Outcome: Progressing  3/4/2023 0315 by Marion Tinoco RN  Outcome: Progressing  Note: Pt assessed as a fall risk this shift. Remains free from falls and accidental injury at this time. Fall precautions in place, including falling star sign and fall risk band on pt. Floor free from obstacles, and bed is locked and in lowest position. Adequate lighting provided. Pt encouraged to call  for any need. Bed alarm activated. Will continue to monitor needs during hourly rounding, and reinforce education on use of call light. Problem: ABCDS Injury Assessment  Goal: Absence of physical injury  3/4/2023 1652 by Faisal Doll RN  Outcome: Progressing  3/4/2023 0315 by Marion Tinoco RN  Outcome: Progressing     Problem: Chronic Conditions and Co-morbidities  Goal: Patient's chronic conditions and co-morbidity symptoms are monitored and maintained or improved  3/4/2023 1652 by Faisal Doll RN  Outcome: Progressing  3/4/2023 0315 by Marion Tinoco RN  Outcome: Progressing     Problem: Skin/Tissue Integrity  Goal: Absence of new skin breakdown  Description: 1. Monitor for areas of redness and/or skin breakdown  2. Assess vascular access sites hourly  3. Every 4-6 hours minimum:  Change oxygen saturation probe site  4.   Every 4-6 hours:  If on nasal continuous positive airway pressure, respiratory therapy assess nares and determine need for appliance change or resting period. 3/4/2023 1652 by America Grider RN  Outcome: Progressing  3/4/2023 0315 by Jordan Ivory RN  Outcome: Progressing     Problem: Pain  Goal: Verbalizes/displays adequate comfort level or baseline comfort level  3/4/2023 1652 by America Grider RN  Outcome: Progressing  3/4/2023 0315 by Jordan Ivory RN  Outcome: Progressing  Note: Pt medicated with pain medication prn. Assessed all pain characteristics including level, type, location, frequency, and onset. Non-pharmacologic interventions offered to pt as well. Pt states pain is tolerable at this time. Will continue to monitor.

## 2023-03-04 NOTE — PROGRESS NOTES
HEMODIALYSIS PRE-TREATMENT NOTE    Patient Identifiers prior to treatment: name, band and birthdate    Isolation Required: MRSA                      Isolation Type: Contact       (please document if patient is being managed as a PUI/COVID-19 patient)        Hepatitis status:                           Date Drawn                             Result  Hepatitis B Surface Antigen 03/02/2023     neg                     Hepatitis B Surface Antibody 03/02/2023 pos     60.08   Hepatitis B Core Antibody            How was Hepatitis Status verified: labs     Was a copy of the labs you documented provided to facility for the patient's chart: yes    Hemodialysis orders verified: yes    Access Within normal limits ( I.e. s/s of infection,...): yes     Pre-Assessment completed: yes    Pre-dialysis report received from: Muna Salguero                      Time: 0945

## 2023-03-05 PROBLEM — R04.0 EPISTAXIS: Status: ACTIVE | Noted: 2023-03-05

## 2023-03-05 LAB
ANION GAP SERPL CALCULATED.3IONS-SCNC: 10 MMOL/L (ref 9–17)
BUN SERPL-MCNC: 66 MG/DL (ref 8–23)
CALCIUM SERPL-MCNC: 8.5 MG/DL (ref 8.6–10.4)
CHLORIDE SERPL-SCNC: 107 MMOL/L (ref 98–107)
CO2 SERPL-SCNC: 25 MMOL/L (ref 20–31)
CREAT SERPL-MCNC: 2.75 MG/DL (ref 0.5–0.9)
GFR SERPL CREATININE-BSD FRML MDRD: 19 ML/MIN/1.73M2
GLUCOSE BLD-MCNC: 169 MG/DL (ref 65–105)
GLUCOSE BLD-MCNC: 262 MG/DL (ref 65–105)
GLUCOSE BLD-MCNC: 325 MG/DL (ref 65–105)
GLUCOSE BLD-MCNC: 72 MG/DL (ref 65–105)
GLUCOSE SERPL-MCNC: 230 MG/DL (ref 70–99)
POTASSIUM SERPL-SCNC: 4.1 MMOL/L (ref 3.7–5.3)
SODIUM SERPL-SCNC: 142 MMOL/L (ref 135–144)

## 2023-03-05 PROCEDURE — 36415 COLL VENOUS BLD VENIPUNCTURE: CPT

## 2023-03-05 PROCEDURE — 6370000000 HC RX 637 (ALT 250 FOR IP): Performed by: STUDENT IN AN ORGANIZED HEALTH CARE EDUCATION/TRAINING PROGRAM

## 2023-03-05 PROCEDURE — 6370000000 HC RX 637 (ALT 250 FOR IP): Performed by: NURSE PRACTITIONER

## 2023-03-05 PROCEDURE — 80048 BASIC METABOLIC PNL TOTAL CA: CPT

## 2023-03-05 PROCEDURE — 82947 ASSAY GLUCOSE BLOOD QUANT: CPT

## 2023-03-05 PROCEDURE — 6360000002 HC RX W HCPCS: Performed by: STUDENT IN AN ORGANIZED HEALTH CARE EDUCATION/TRAINING PROGRAM

## 2023-03-05 PROCEDURE — 97530 THERAPEUTIC ACTIVITIES: CPT

## 2023-03-05 PROCEDURE — 99232 SBSQ HOSP IP/OBS MODERATE 35: CPT | Performed by: STUDENT IN AN ORGANIZED HEALTH CARE EDUCATION/TRAINING PROGRAM

## 2023-03-05 PROCEDURE — 97110 THERAPEUTIC EXERCISES: CPT

## 2023-03-05 PROCEDURE — 2060000000 HC ICU INTERMEDIATE R&B

## 2023-03-05 RX ADMIN — HEPARIN SODIUM 5000 UNITS: 5000 INJECTION INTRAVENOUS; SUBCUTANEOUS at 20:21

## 2023-03-05 RX ADMIN — DOCUSATE SODIUM 100 MG: 100 CAPSULE, LIQUID FILLED ORAL at 20:28

## 2023-03-05 RX ADMIN — ACETAMINOPHEN 650 MG: 325 TABLET ORAL at 20:27

## 2023-03-05 RX ADMIN — INSULIN GLARGINE 55 UNITS: 100 INJECTION, SOLUTION SUBCUTANEOUS at 20:22

## 2023-03-05 RX ADMIN — INSULIN LISPRO 8 UNITS: 100 INJECTION, SOLUTION INTRAVENOUS; SUBCUTANEOUS at 17:09

## 2023-03-05 RX ADMIN — TICAGRELOR 90 MG: 90 TABLET ORAL at 20:29

## 2023-03-05 RX ADMIN — SALINE NASAL SPRAY 1 SPRAY: 1.5 SOLUTION NASAL at 13:32

## 2023-03-05 RX ADMIN — HEPARIN SODIUM 5000 UNITS: 5000 INJECTION INTRAVENOUS; SUBCUTANEOUS at 05:35

## 2023-03-05 RX ADMIN — GUAIFENESIN 600 MG: 600 TABLET, EXTENDED RELEASE ORAL at 08:33

## 2023-03-05 RX ADMIN — POLYETHYLENE GLYCOL 3350 17 G: 17 POWDER, FOR SOLUTION ORAL at 08:31

## 2023-03-05 RX ADMIN — ALLOPURINOL 100 MG: 100 TABLET ORAL at 08:33

## 2023-03-05 RX ADMIN — BUSPIRONE HYDROCHLORIDE 7.5 MG: 5 TABLET ORAL at 13:32

## 2023-03-05 RX ADMIN — Medication 3 MG: at 20:27

## 2023-03-05 RX ADMIN — BUMETANIDE 3 MG: 1 TABLET ORAL at 08:34

## 2023-03-05 RX ADMIN — TAMSULOSIN HYDROCHLORIDE 0.4 MG: 0.4 CAPSULE ORAL at 08:33

## 2023-03-05 RX ADMIN — Medication 60 MG: at 20:26

## 2023-03-05 RX ADMIN — ASPIRIN 81 MG: 81 TABLET, CHEWABLE ORAL at 08:34

## 2023-03-05 RX ADMIN — ATORVASTATIN CALCIUM 80 MG: 80 TABLET, FILM COATED ORAL at 20:29

## 2023-03-05 RX ADMIN — BUSPIRONE HYDROCHLORIDE 7.5 MG: 5 TABLET ORAL at 08:33

## 2023-03-05 RX ADMIN — BUSPIRONE HYDROCHLORIDE 7.5 MG: 5 TABLET ORAL at 20:29

## 2023-03-05 RX ADMIN — INSULIN LISPRO 12 UNITS: 100 INJECTION, SOLUTION INTRAVENOUS; SUBCUTANEOUS at 20:22

## 2023-03-05 RX ADMIN — GUAIFENESIN 600 MG: 600 TABLET, EXTENDED RELEASE ORAL at 20:29

## 2023-03-05 RX ADMIN — DOCUSATE SODIUM 100 MG: 100 CAPSULE, LIQUID FILLED ORAL at 08:33

## 2023-03-05 RX ADMIN — BUMETANIDE 3 MG: 1 TABLET ORAL at 17:09

## 2023-03-05 RX ADMIN — GABAPENTIN 300 MG: 300 CAPSULE ORAL at 08:34

## 2023-03-05 RX ADMIN — PANTOPRAZOLE SODIUM 40 MG: 40 TABLET, DELAYED RELEASE ORAL at 05:35

## 2023-03-05 RX ADMIN — Medication 60 MG: at 08:31

## 2023-03-05 RX ADMIN — HEPARIN SODIUM 5000 UNITS: 5000 INJECTION INTRAVENOUS; SUBCUTANEOUS at 13:32

## 2023-03-05 RX ADMIN — TICAGRELOR 90 MG: 90 TABLET ORAL at 08:34

## 2023-03-05 ASSESSMENT — ENCOUNTER SYMPTOMS
WHEEZING: 0
CONSTIPATION: 0
NAUSEA: 0
ABDOMINAL PAIN: 0
VOMITING: 0

## 2023-03-05 ASSESSMENT — PAIN SCALES - GENERAL
PAINLEVEL_OUTOF10: 5
PAINLEVEL_OUTOF10: 0
PAINLEVEL_OUTOF10: 5

## 2023-03-05 ASSESSMENT — PAIN DESCRIPTION - LOCATION
LOCATION: FOOT
LOCATION: CHEST

## 2023-03-05 ASSESSMENT — PAIN DESCRIPTION - ORIENTATION
ORIENTATION: RIGHT;LEFT
ORIENTATION: RIGHT

## 2023-03-05 ASSESSMENT — PAIN DESCRIPTION - PAIN TYPE: TYPE: ACUTE PAIN

## 2023-03-05 NOTE — PLAN OF CARE
Problem: Discharge Planning  Goal: Discharge to home or other facility with appropriate resources  3/5/2023 1439 by Shital Shukla RN  Outcome: Progressing  3/5/2023 0342 by Cassia Pedraza RN  Outcome: Progressing  Flowsheets (Taken 3/5/2023 9106)  Discharge to home or other facility with appropriate resources:   Identify barriers to discharge with patient and caregiver   Arrange for interpreters to assist at discharge as needed     Problem: Safety - Adult  Goal: Free from fall injury  3/5/2023 1439 by Shital Shukla RN  Outcome: Progressing  3/5/2023 0342 by Cassia Pedraza RN  Outcome: Progressing  Note: Pt assessed as a fall risk this shift. Remains free from falls and accidental injury at this time. Fall precautions in place, including falling star sign and fall risk band on pt. Floor free from obstacles, and bed is locked and in lowest position. Adequate lighting provided. Pt encouraged to call  for any need. Bed alarm activated. Will continue to monitor needs during hourly rounding, and reinforce education on use of call light. Problem: ABCDS Injury Assessment  Goal: Absence of physical injury  3/5/2023 1439 by Shital Shukla RN  Outcome: Progressing  3/5/2023 0342 by Cassia Pedraza RN  Outcome: Progressing     Problem: Chronic Conditions and Co-morbidities  Goal: Patient's chronic conditions and co-morbidity symptoms are monitored and maintained or improved  3/5/2023 1439 by Shital Shukla RN  Outcome: Progressing  3/5/2023 0342 by Cassia Pedraza RN  Outcome: Progressing     Problem: Skin/Tissue Integrity  Goal: Absence of new skin breakdown  Description: 1. Monitor for areas of redness and/or skin breakdown  2. Assess vascular access sites hourly  3. Every 4-6 hours minimum:  Change oxygen saturation probe site  4.   Every 4-6 hours:  If on nasal continuous positive airway pressure, respiratory therapy assess nares and determine need for appliance change or resting period.   3/5/2023 1439 by Tunde Clemente RN  Outcome: Progressing  3/5/2023 0342 by Stephanie Matthews RN  Outcome: Progressing     Problem: Pain  Goal: Verbalizes/displays adequate comfort level or baseline comfort level  3/5/2023 1439 by Tunde Clemente RN  Outcome: Progressing  3/5/2023 0342 by Stephanie Matthews RN  Outcome: Progressing

## 2023-03-05 NOTE — PROGRESS NOTES
[facility]   Family Medicine        Progress Note      Date:   3/5/2023  Patient name:  Jessica Powell  Date of admission:  2/27/2023  9:54 AM  MRN:   [id number]  YOB: 1960      Brief HPI    Respiratory failure secondary to CHF exacerbation, renal failure, requiring ultrafiltration    ASSESSMENT/PLAN     Hospital Problems             Last Modified POA    * (Principal) Acute respiratory failure with hypoxia ([address] 2/27/2023 Yes    Type 2 diabetes mellitus with hyperglycemia, with long-term current use of insulin ([address] 2/27/2023 Yes    Acute on chronic heart failure, unspecified heart failure type ([address] 2/27/2023 Yes    Diarrhea of presumed infectious origin 2/27/2023 Yes    Epistaxis 3/5/2023 Yes    CKD (chronic kidney disease) stage 4, GFR 15-29 ml/min ([address] 2/27/2023 Yes       1. MWF dialysis with ultrafiltration  2. Lantus 55 units twice daily with a high dose scale  3.  BB dose reduced due to bradycardia, hold amlodipine due to low BP  4. Continue bumex  6. Cardio, pulm, nephro on board  7. Miralax, colace daily, will start senna if no BM by tomorrow  8. Afrin for nose likely anterior nose bleed secondary to continuous nasal cannula    DVT:heparin subcu      SUBJECTIVE:     Patient was seen and examined at bedside patient, cough and dyspnea are improved. Pt is having nosebleeds from the right nostril likely secondary to nasal cannula. PT had a BM today. Pt has not passed BM in several days. Consultant notes, labs & imaging reviewed. Review of Systems   Constitutional:  Negative for chills, diaphoresis and fever. Respiratory:  Negative for wheezing. Dyspnea and cough have improved   Cardiovascular:  Negative for palpitations and leg swelling. Gastrointestinal:  Negative for abdominal pain, constipation, nausea and vomiting. Genitourinary:  Negative for difficulty urinating and dysuria.    Neurological:  Negative for dizziness, light-headedness and headaches. OBJECTIVE:     /71   Pulse 63   Temp 97.7 °F (36.5 °C) (Oral)   Resp 18   Ht 5' 5\" (1.651 m)   Wt 245 lb 6 oz (111.3 kg)   SpO2 93%   BMI 40.83 kg/m²      Physical Exam  Vitals and nursing note reviewed. HENT:      Nose:      Comments: Right nostril has clots & dried blood, no brisk bleeding to indicated posterior bleed  Eyes:      Extraocular Movements: Extraocular movements intact. Conjunctiva/sclera: Conjunctivae normal.   Cardiovascular:      Rate and Rhythm: Normal rate and regular rhythm. Pulses: Normal pulses. Heart sounds: Normal heart sounds. Pulmonary:      Breath sounds: Rales present. No wheezing. Comments: Fine rales at bases, improving  Abdominal:      General: There is no distension. Palpations: Abdomen is soft. Tenderness: There is no abdominal tenderness. There is no guarding. Musculoskeletal:      Right lower leg: No edema. Left lower leg: No edema. Comments: Edema is improving   Neurological:      General: No focal deficit present. Mental Status: She is alert and oriented to person, place, and time.         Intake/Output:    Intake/Output Summary (Last 24 hours) at 3/5/2023 1653  Last data filed at 3/5/2023 1338  Gross per 24 hour   Intake 600 ml   Output 750 ml   Net -150 ml       Laboratory Testing:  CBC:   Recent Labs     03/04/23  0552   WBC 6.7   HGB 11.0*          BMP:    Recent Labs     03/03/23  0547 03/04/23  0552 03/05/23  1326    142 142   K 4.8 4.4 4.1    105 107   CO2 27 26 25   BUN 78* 85* 66*   CREATININE 3.34* 3.50* 2.75*   GLUCOSE 331* 98 230*       Magnesium:   Lab Results   Component Value Date/Time    MG 2.2 02/12/2023 06:07 AM     Phosphorus:   Lab Results   Component Value Date/Time    PHOS 4.0 01/27/2023 03:09 AM     Ionized Calcium:   Lab Results   Component Value Date/Time    CAION 1.04 04/18/2022 12:27 PM      PT/INR:    Lab Results   Component Value Date/Time    PROTIME 13.3 03/03/2023 05:47 AM    PROTIME 16.4 08/27/2015 12:00 AM    PROTIME 16.4 08/27/2015 12:00 AM    INR 1.0 03/03/2023 05:47 AM     PTT:    Lab Results   Component Value Date/Time    APTT 27.5 11/10/2021 03:50 PM           Leoncio Montoya MD   3/5/2023 4:53 PM

## 2023-03-05 NOTE — PROGRESS NOTES
Physical Therapy  Facility/Department: [facility] PROGRESSIVE CARE  Daily Treatment Note  NAME: Altagracia Palafox  : 1960  MRN: [id number]    Date of Service: 3/5/2023    Discharge Recommendations:  Patient would benefit from continued therapy after discharge        Patient Diagnosis(es): The encounter diagnosis was Congestive heart failure, unspecified HF chronicity, unspecified heart failure type ([facility] Assessment   Assessment: Patient quickly fatigued with STS, requiring seated rest break. Complaint of dizziness following STS but quickly relieving with seated break. Activity Tolerance: Patient limited by endurance; Patient tolerated treatment well     Plan    Physcial Therapy Plan  General Plan: 5-7 times per week  Specific Instructions for Next Treatment: Bed mobility, Supine/seated Exercise; Diaphragmatic breathing exercises  Current Treatment Recommendations: Strengthening;Balance training;Functional mobility training;Transfer training; Endurance training;Gait training;Patient/Caregiver education & training; Safety education & training;Equipment evaluation, education, & procurement; Therapeutic activities     Restrictions  Restrictions/Precautions  Restrictions/Precautions: Fall Risk, General Precautions  Required Braces or Orthoses?: Yes (Lymphedema)     Subjective    Subjective  Subjective: Patient in bed upon arrival and agreeable to PT treatment  Pain: Denies pain     Objective   Vitals     Bed Mobility Training  Bed Mobility Training: Yes  Overall Level of Assistance: Stand-by assistance  Interventions: Safety awareness training  Rolling: Stand-by assistance (use of bed rails)  Supine to Sit: Stand-by assistance (use of bed rails)  Sit to Supine: Stand-by assistance (use of bed rails)  Scooting: Stand-by assistance  Balance  Sitting: Without support  Standing: With support (RW)  Transfer Training  Transfer Training: Yes  Overall Level of Assistance: Contact-guard assistance  Sit to Stand: Contact-guard assistance (x3, use of RW)  Stand to Sit: Contact-guard assistance  Gait Training  Gait Training: No     PT Exercises  A/AROM Exercises: Seated EOB 20 reps ea: LAQ, marching, hip abd/add, ankle pumps  Functional Mobility Circuit Training: STS x3 reps, Scooting up in bed x3 for repositioning  Static Sitting Balance Exercises: Dangle x20 min  Static Standing Balance Exercises: 10 sec x3 with use of RW for BUE support             Goals  Short Term Goals  Time Frame for Short Term Goals: 8 visits  Short Term Goal 1: pt to demo functional bed mobility with Jose  Short Term Goal 2: pt to tolerate 30 minutes of therapeutic activity and exercise while maintaining SpO2 of 90% or greater  Short Term Goal 3: Pt to demo good technique for Core and BLE strengthening exercises  Short Term Goal 4: PT to assess OOB transfers, gait, and stair climbing when medically appropriate    Education  Patient Education  Education Given To: Patient  Education Provided: Role of Therapy;Plan of Care  Education Method: Verbal  Barriers to Learning: None  Education Outcome: Verbalized understanding;Demonstrated understanding    Therapy Time   Individual Concurrent Group Co-treatment   Time In 1115         Time Out 1143         Minutes 29                 [place], [place]

## 2023-03-05 NOTE — CARE COORDINATION
ONGOING DISCHARGE PLANNING NOTE:    Writer reviewed LSW notes, and discharge plan is DC to [address]. Authorization, has been approved through 3/6.      Electronically signed by Gabriela Riggs RN on 3/5/2023 at 12:32 PM

## 2023-03-05 NOTE — PROGRESS NOTES
Pulmonary Progress Note  [facility]      Patient - Andrew Durbin,  Age - 57 y.o.    - 1960      Room Number - -01   MRN -  [id number]   Acct # - [de-identified]  Date of Admission -  2023  9:54 AM        Consulting Ivy Boles MD  Primary Care Physician - Jose Frost, APRN - CNP     SUBJECTIVE   She looks comfortable on 3 L nasal cannula. Not in any distress    OBJECTIVE   VITALS    height is 5' 5\" (1.651 m) and weight is 245 lb 6 oz (111.3 kg). Her oral temperature is 97.9 °F (36.6 °C). Her blood pressure is 125/76 and her pulse is 52. Her respiration is 18 and oxygen saturation is 95%. Body mass index is 40.83 kg/m². Temperature Range: Temp: 97.9 °F (36.6 °C) Temp  Av.9 °F (36.6 °C)  Min: 96.8 °F (36 °C)  Max: 98.8 °F (37.1 °C)  BP Range:  Systolic (84KYR), QTL:268 , Min:111 , ДМИТРИЙ:569     Diastolic (35RMK), FSX:79, Min:39, Max:76    Pulse Range: Pulse  Av.4  Min: 45  Max: 65  Respiration Range: Resp  Av.4  Min: 18  Max: 20  Current Pulse Ox[de-identified]  SpO2: 95 %  24HR Pulse Ox Range:  SpO2  Av.3 %  Min: 92 %  Max: 95 %  Oxygen Amount and Delivery: O2 Flow Rate (L/min): 4 L/min    Wt Readings from Last 3 Encounters:   23 245 lb 6 oz (111.3 kg)   23 248 lb (112.5 kg)   23 248 lb 2.6 oz (112.6 kg)       I/O (24 Hours)    Intake/Output Summary (Last 24 hours) at 3/5/2023 1239  Last data filed at 3/5/2023 0530  Gross per 24 hour   Intake 500 ml   Output 2750 ml   Net -2250 ml       EXAM     General Appearance  Awake, alert, oriented, in no acute distress  HEENT - normocephalic, atraumatic.  []  Mallampati  [x] Crowded airway   [x] Macroglossia  []  Retrognathia  [] Micrognathia  []  Normal tongue size []  Normal Bite  [] Galax sign positive    Neck - Supple,  trachea midline   Lungs -diminished right greater than left  Cardiovascular - Heart sounds are normal. Regular rate and rhythm   Abdomen - Soft, nontender, nondistended, no masses or organomegaly  Neurologic - There are no focal motor or sensory deficits  Skin - No bruising or bleeding  Extremities - No clubbing, cyanosis, +lymphedema    MEDS      polyethylene glycol  17 g Oral Daily    docusate sodium  100 mg Oral BID    busPIRone  7.5 mg Oral TID    carvedilol  3.125 mg Oral BID    insulin glargine  55 Units SubCUTAneous BID    insulin lispro  0-16 Units SubCUTAneous 4x Daily AC & HS    guaiFENesin  600 mg Oral BID    bumetanide  3 mg Oral BID    dextromethorphan  60 mg Oral 2 times per day    sodium chloride flush  5-40 mL IntraVENous 2 times per day    heparin (porcine)  5,000 Units SubCUTAneous 3 times per day    allopurinol  100 mg Oral Daily    aspirin  81 mg Oral Daily    [Held by provider] amLODIPine  5 mg Oral Daily    atorvastatin  80 mg Oral Nightly    gabapentin  300 mg Oral Daily    pantoprazole  40 mg Oral QAM AC    tamsulosin  0.4 mg Oral Daily    ticagrelor  90 mg Oral BID      sodium chloride      dextrose       sodium chloride, anticoagulant sodium citrate, anticoagulant sodium citrate, albuterol, melatonin, benzonatate, sodium chloride flush, sodium chloride, magnesium sulfate, ondansetron **OR** ondansetron, acetaminophen **OR** acetaminophen, glucose, dextrose bolus **OR** dextrose bolus, glucagon (rDNA), dextrose, albuterol    LABS   CBC   Recent Labs     03/04/23  0552   WBC 6.7   HGB 11.0*   HCT 33.7*   MCV 96.0        BMP:   Lab Results   Component Value Date/Time     03/04/2023 05:52 AM    K 4.4 03/04/2023 05:52 AM     03/04/2023 05:52 AM    CO2 26 03/04/2023 05:52 AM    BUN 85 03/04/2023 05:52 AM    LABALBU 3.0 02/24/2023 06:15 AM    CREATININE 3.50 03/04/2023 05:52 AM    CALCIUM 8.4 03/04/2023 05:52 AM    GFRAA 27 09/06/2022 04:40 PM    LABGLOM 14 03/04/2023 05:52 AM     ABGs:  Lab Results   Component Value Date/Time    PHART 7.429 04/24/2022 01:42 PM    PO2ART 47.6 04/24/2022 01:42 PM    OAM5LLL 45.9 04/24/2022 01:42 PM      Lab Results   Component Value Date/Time    MODE CONDITION NO LONGER WARRANTS 04/06/2021 07:49 PM     Ionized Calcium:  No results found for: IONCA  Magnesium:    Lab Results   Component Value Date/Time    MG 2.2 02/12/2023 06:07 AM     Phosphorus:    Lab Results   Component Value Date/Time    PHOS 4.0 01/27/2023 03:09 AM        LIVER PROFILE No results for input(s): AST, ALT, LIPASE, BILIDIR, BILITOT, ALKPHOS in the last 72 hours. Invalid input(s): AMYLASE,  ALB  INR   Recent Labs     03/03/23  0547   INR 1.0     PTT   Lab Results   Component Value Date    APTT 27.5 11/10/2021         RADIOLOGY     (See actual reports for details)    ASSESSMENT/PLAN     Volume overload/CHF  Bilateral pleural effusions right greater than left  Acute hypoxic respiratory failure, currently on 4 L nasal cannula, baseline is 3 L  Chronic kidney disease stage IV  ERICK  CHF with ejection fraction  Type 2 diabetes  Morbid obesity  Restrictive pattern on PFTs  History of DVT with chronic lymphedema  Non-smoker  Full code    I weaned her down to 3 L nasal cannula; keep saturation greater than 88%  Continue antitussive medication  She is on Brilinta, which makes increased risk of bleeding with thoracentesis  I would like to avoid it because she is not in any distress and does not have a fever or any pleurisy.   Not unreasonable to discharge her tomorrow to [facility] with continued dialysis as an outpatient to remove fluid      Electronically signed by Reyna Altamirano MD on 3/5/2023 at 12:39 PM

## 2023-03-05 NOTE — PLAN OF CARE
Problem: Discharge Planning  Goal: Discharge to home or other facility with appropriate resources  3/5/2023 0342 by Fidel Castro RN  Outcome: Progressing  Flowsheets (Taken 3/5/2023 0342)  Discharge to home or other facility with appropriate resources:   Identify barriers to discharge with patient and caregiver   Arrange for interpreters to assist at discharge as needed     Problem: Safety - Adult  Goal: Free from fall injury  3/5/2023 0342 by Fidel Castro RN  Outcome: Progressing  Note: Pt assessed as a fall risk this shift. Remains free from falls and accidental injury at this time. Fall precautions in place, including falling star sign and fall risk band on pt. Floor free from obstacles, and bed is locked and in lowest position. Adequate lighting provided. Pt encouraged to call  for any need. Bed alarm activated. Will continue to monitor needs during hourly rounding, and reinforce education on use of call light.        Problem: Chronic Conditions and Co-morbidities  Goal: Patient's chronic conditions and co-morbidity symptoms are monitored and maintained or improved  3/5/2023 0342 by Fidel Castro RN  Outcome: Progressing     Problem: Pain  Goal: Verbalizes/displays adequate comfort level or baseline comfort level  3/5/2023 0342 by Fidel Castro RN  Outcome: Progressing

## 2023-03-05 NOTE — PROGRESS NOTES
Department of Internal Medicine  Nephrology Raul Stern MD  Progress Note    Reason for consultation: Management of chronic kidney disease stage IV. Consulting physician: Jesus Mercado MD.    Interval history: Patient was seen and examined-hemodialysis yesterday 1.5 kg removed. She had mild fatigue associated with IDH with dialysis yesterday      patient has a functional left brachiocephalic AV fistula which was not successfully cannulated yesterday due to infiltration and hence she had a right IJ tunneled catheter placed. History of present illness: This is a 57 y.o. female with a significant past medical history of Obesity, type 2 diabetes mellitus with diabetic nephropathy, coronary artery disease [s/p CABG x3], systemic hypertension, heart failure with preserved ejection fraction [HFrEF] and chronic kidney disease stage IV secondary to diabetic and hypertensive nephropathy [was on hemodialysis for several months until discontinuation in August 2022 due to some recovery of renal function], who was sent in from the [address] [place] [address] for further evaluation of hypoxia. She stated that she has had progressively worsening shortness of breath and cough over the past 3 days prior to presentation and had diarrhea for 2 days. She also complains of general body aches, fatigue and dysuria. She is on chronic oxygen at the facility usually at 3 L/min flow. Oxygen saturation was in the 80% range even with increasing O2 flow to 6 L/min and hence presentation to the hospital.  Vital signs were stable at presentation with blood pressure 130/68 mmHg. Chest x-ray at presentation showed mild pulmonary vascular congestion with increased interstitial opacities at the lung bases. Laboratory studies revealed BUN/creatinine 50/2.39 mg/dL and hence nephrology consultation.     Scheduled Meds:   polyethylene glycol  17 g Oral Daily    docusate sodium  100 mg Oral BID    busPIRone  7.5 mg Oral TID carvedilol  3.125 mg Oral BID    insulin glargine  55 Units SubCUTAneous BID    insulin lispro  0-16 Units SubCUTAneous 4x Daily AC & HS    guaiFENesin  600 mg Oral BID    bumetanide  3 mg Oral BID    dextromethorphan  60 mg Oral 2 times per day    sodium chloride flush  5-40 mL IntraVENous 2 times per day    heparin (porcine)  5,000 Units SubCUTAneous 3 times per day    allopurinol  100 mg Oral Daily    aspirin  81 mg Oral Daily    [Held by provider] amLODIPine  5 mg Oral Daily    atorvastatin  80 mg Oral Nightly    gabapentin  300 mg Oral Daily    pantoprazole  40 mg Oral QAM AC    tamsulosin  0.4 mg Oral Daily    ticagrelor  90 mg Oral BID     Continuous Infusions:   sodium chloride      dextrose       Physical Exam:    VITALS:  /76   Pulse 52   Temp 97.9 °F (36.6 °C) (Oral)   Resp 18   Ht 5' 5\" (1.651 m)   Wt 245 lb 6 oz (111.3 kg)   SpO2 95%   BMI 40.83 kg/m²   TEMPERATURE:  Current - Temp: 97.9 °F (36.6 °C);  Max - Temp  Av.1 °F (36.7 °C)  Min: 97.6 °F (36.4 °C)  Max: 98.8 °F (37.1 °C)  RESPIRATIONS RANGE: Resp  Av.5  Min: 18  Max: 20  PULSE RANGE: Pulse  Av.2  Min: 52  Max: 65  BLOOD PRESSURE RANGE:  Systolic (62ZLC), VNK:972 , Min:111 , OEX:365 ; Diastolic (63AVV), WPK:11, Min:43, Max:76  PULSE OXIMETRY RANGE: SpO2  Av.3 %  Min: 92 %  Max: 95 %  24HR INTAKE/OUTPUT:    Intake/Output Summary (Last 24 hours) at 3/5/2023 1355  Last data filed at 3/5/2023 1338  Gross per 24 hour   Intake 600 ml   Output 750 ml   Net -150 ml       Constitutional: alert, appears stated age, and cooperative    Skin: Skin color, texture, turgor normal. No rashes or lesions    Head: Normocephalic, without obvious abnormality, atraumatic     Cardiovascular/Edema: regular rate and rhythm, S1, S2 normal, no murmur, click, rub or gallop    Respiratory: Diminished breath sounds with bilateral basal Rales    Abdomen: soft, non-tender; bowel sounds normal; no masses,  no organomegaly    Back: symmetric, no curvature. ROM normal. No CVA tenderness. Extremities: Bilateral pitting pedal edema 2+; left arm AV fistula with good thrill and bruit. Neuro:  Grossly normal    CBC:   Recent Labs     03/03/23  0547 03/04/23  0552   WBC 13.7* 6.7   HGB 10.7* 11.0*    150       BMP:    Recent Labs     03/03/23  0547 03/04/23  0552    142   K 4.8 4.4    105   CO2 27 26   BUN 78* 85*   CREATININE 3.34* 3.50*   GLUCOSE 331* 98       Lab Results   Component Value Date/Time    NITRU NEGATIVE 02/27/2023 03:47 PM    COLORU Yellow 02/27/2023 03:47 PM    PHUR 6.5 02/27/2023 03:47 PM    WBCUA 0 TO 2 02/27/2023 03:47 PM    RBCUA 0 TO 2 02/27/2023 03:47 PM    MUCUS 1+ 02/07/2023 03:00 PM    TRICHOMONAS NOT REPORTED 11/14/2021 02:05 AM    YEAST FEW 02/07/2023 03:00 PM    BACTERIA None 02/27/2023 03:47 PM    SPECGRAV 1.011 02/27/2023 03:47 PM    LEUKOCYTESUR NEGATIVE 02/27/2023 03:47 PM    UROBILINOGEN Normal 02/27/2023 03:47 PM    BILIRUBINUR NEGATIVE 02/27/2023 03:47 PM    GLUCOSEU NEGATIVE 02/27/2023 03:47 PM    KETUA NEGATIVE 02/27/2023 03:47 PM    AMORPHOUS 1+ 02/25/2023 03:15 AM     Urine Sodium:     Lab Results   Component Value Date/Time    JASON 47 11/14/2021 02:04 AM     Urine Chloride:    Lab Results   Component Value Date/Time    CLUR 26 11/14/2021 02:04 AM     Urine Protein:     Lab Results   Component Value Date/Time    TPU 20 11/12/2021 10:30 AM     Urine Creatinine:     Lab Results   Component Value Date/Time    LABCREA 65.4 01/26/2023 01:10 PM     IMPRESSION/RECOMMENDATIONS:      1. Chronic kidney disease stage IV [baseline serum creatinine 2 to 3 mg/dL] renal function is stable and CKD in this patient is consistent with diabetic and hypertensive nephropathy as well as cardiorenal syndrome. She has peripheral edema and has indications for continuation of diuretics. Status post tunnel catheter placed on 3/3/2023   plan: Continue Bumex 3 mg p.o. twice daily. Monitor urine output closely.   Basic metabolic profile daily. Plan on Monday Wednesday Friday schedule dialysis with ultrafiltration    2. Shortness of breath - multifactorial etiology including secondary to decompensated diastolic heart failure. Daily weights.  2 g sodium per day. 3.  Systemic hypertension - blood pressure control is adequate. 4.  Borderline hyperkalemia -stable    5.   Right pleural effusion pulmonary vascular congestion on x-ray 3/3/2023      Patient will require outpatient dialysis given risk for uremic symptoms and recurrent fluid overload  MD LESIA Ragland  Attending Nephrologist  3/5/2023 1:55 PM

## 2023-03-06 ENCOUNTER — APPOINTMENT (OUTPATIENT)
Dept: GENERAL RADIOLOGY | Age: 63
DRG: 291 | End: 2023-03-06
Payer: COMMERCIAL

## 2023-03-06 PROBLEM — J96.11 CHRONIC RESPIRATORY FAILURE WITH HYPOXIA (HCC): Status: ACTIVE | Noted: 2023-03-06

## 2023-03-06 LAB
ABSOLUTE EOS #: 0.3 K/UL (ref 0–0.4)
ABSOLUTE LYMPH #: 0.6 K/UL (ref 1–4.8)
ABSOLUTE MONO #: 0.4 K/UL (ref 0.1–1.3)
ANION GAP SERPL CALCULATED.3IONS-SCNC: 12 MMOL/L (ref 9–17)
BASOPHILS # BLD: 0 % (ref 0–2)
BASOPHILS ABSOLUTE: 0 K/UL (ref 0–0.2)
BUN SERPL-MCNC: 69 MG/DL (ref 8–23)
CALCIUM SERPL-MCNC: 8.7 MG/DL (ref 8.6–10.4)
CHLORIDE SERPL-SCNC: 105 MMOL/L (ref 98–107)
CO2 SERPL-SCNC: 24 MMOL/L (ref 20–31)
CREAT SERPL-MCNC: 2.65 MG/DL (ref 0.5–0.9)
EOSINOPHILS RELATIVE PERCENT: 4 % (ref 0–4)
GFR SERPL CREATININE-BSD FRML MDRD: 20 ML/MIN/1.73M2
GLUCOSE BLD-MCNC: 193 MG/DL (ref 65–105)
GLUCOSE BLD-MCNC: 311 MG/DL (ref 65–105)
GLUCOSE BLD-MCNC: 330 MG/DL (ref 65–105)
GLUCOSE SERPL-MCNC: 307 MG/DL (ref 70–99)
HCT VFR BLD AUTO: 35.2 % (ref 36–46)
HGB BLD-MCNC: 11.4 G/DL (ref 12–16)
LYMPHOCYTES # BLD: 6 % (ref 24–44)
MCH RBC QN AUTO: 31.6 PG (ref 26–34)
MCHC RBC AUTO-ENTMCNC: 32.5 G/DL (ref 31–37)
MCV RBC AUTO: 97 FL (ref 80–100)
MONOCYTES # BLD: 5 % (ref 1–7)
PDW BLD-RTO: 15.7 % (ref 11.5–14.9)
PLATELET # BLD AUTO: 131 K/UL (ref 150–450)
PMV BLD AUTO: 9.1 FL (ref 6–12)
POTASSIUM SERPL-SCNC: 4.3 MMOL/L (ref 3.7–5.3)
RBC # BLD: 3.63 M/UL (ref 4–5.2)
SEG NEUTROPHILS: 85 % (ref 36–66)
SEGMENTED NEUTROPHILS ABSOLUTE COUNT: 8.1 K/UL (ref 1.3–9.1)
SODIUM SERPL-SCNC: 141 MMOL/L (ref 135–144)
WBC # BLD AUTO: 9.5 K/UL (ref 3.5–11)

## 2023-03-06 PROCEDURE — 2580000003 HC RX 258: Performed by: STUDENT IN AN ORGANIZED HEALTH CARE EDUCATION/TRAINING PROGRAM

## 2023-03-06 PROCEDURE — 90935 HEMODIALYSIS ONE EVALUATION: CPT

## 2023-03-06 PROCEDURE — 71045 X-RAY EXAM CHEST 1 VIEW: CPT

## 2023-03-06 PROCEDURE — 6370000000 HC RX 637 (ALT 250 FOR IP): Performed by: INTERNAL MEDICINE

## 2023-03-06 PROCEDURE — 97110 THERAPEUTIC EXERCISES: CPT

## 2023-03-06 PROCEDURE — 97530 THERAPEUTIC ACTIVITIES: CPT

## 2023-03-06 PROCEDURE — 85025 COMPLETE CBC W/AUTO DIFF WBC: CPT

## 2023-03-06 PROCEDURE — 82947 ASSAY GLUCOSE BLOOD QUANT: CPT

## 2023-03-06 PROCEDURE — 97116 GAIT TRAINING THERAPY: CPT

## 2023-03-06 PROCEDURE — 6360000002 HC RX W HCPCS: Performed by: STUDENT IN AN ORGANIZED HEALTH CARE EDUCATION/TRAINING PROGRAM

## 2023-03-06 PROCEDURE — 6370000000 HC RX 637 (ALT 250 FOR IP): Performed by: NURSE PRACTITIONER

## 2023-03-06 PROCEDURE — 97535 SELF CARE MNGMENT TRAINING: CPT

## 2023-03-06 PROCEDURE — 80048 BASIC METABOLIC PNL TOTAL CA: CPT

## 2023-03-06 PROCEDURE — 6370000000 HC RX 637 (ALT 250 FOR IP): Performed by: STUDENT IN AN ORGANIZED HEALTH CARE EDUCATION/TRAINING PROGRAM

## 2023-03-06 PROCEDURE — 2060000000 HC ICU INTERMEDIATE R&B

## 2023-03-06 PROCEDURE — 36415 COLL VENOUS BLD VENIPUNCTURE: CPT

## 2023-03-06 PROCEDURE — 99232 SBSQ HOSP IP/OBS MODERATE 35: CPT | Performed by: STUDENT IN AN ORGANIZED HEALTH CARE EDUCATION/TRAINING PROGRAM

## 2023-03-06 PROCEDURE — 2500000003 HC RX 250 WO HCPCS: Performed by: INTERNAL MEDICINE

## 2023-03-06 RX ORDER — CARVEDILOL 3.12 MG/1
3.12 TABLET ORAL 2 TIMES DAILY
Qty: 60 TABLET | Refills: 3
Start: 2023-03-06

## 2023-03-06 RX ORDER — PANTOPRAZOLE SODIUM 40 MG/1
40 TABLET, DELAYED RELEASE ORAL
Qty: 30 TABLET | Refills: 3
Start: 2023-03-07

## 2023-03-06 RX ORDER — POLYETHYLENE GLYCOL 3350 17 G/17G
17 POWDER, FOR SOLUTION ORAL DAILY PRN
Qty: 527 G | Refills: 1
Start: 2023-03-06 | End: 2023-04-05

## 2023-03-06 RX ORDER — INSULIN GLARGINE 100 [IU]/ML
57 INJECTION, SOLUTION SUBCUTANEOUS 2 TIMES DAILY
Qty: 7 ADJUSTABLE DOSE PRE-FILLED PEN SYRINGE | Refills: 5
Start: 2023-03-06 | End: 2023-04-05

## 2023-03-06 RX ORDER — INSULIN GLARGINE 100 [IU]/ML
57 INJECTION, SOLUTION SUBCUTANEOUS 2 TIMES DAILY
Qty: 7 ADJUSTABLE DOSE PRE-FILLED PEN SYRINGE | Refills: 5 | Status: SHIPPED | OUTPATIENT
Start: 2023-03-06 | End: 2023-03-06 | Stop reason: SDUPTHER

## 2023-03-06 RX ORDER — INSULIN GLARGINE 100 [IU]/ML
57 INJECTION, SOLUTION SUBCUTANEOUS 2 TIMES DAILY
Status: DISCONTINUED | OUTPATIENT
Start: 2023-03-06 | End: 2023-03-08 | Stop reason: HOSPADM

## 2023-03-06 RX ORDER — BUMETANIDE 1 MG/1
3 TABLET ORAL 2 TIMES DAILY
Qty: 120 TABLET | Refills: 2
Start: 2023-03-06

## 2023-03-06 RX ADMIN — DOCUSATE SODIUM 100 MG: 100 CAPSULE, LIQUID FILLED ORAL at 21:39

## 2023-03-06 RX ADMIN — INSULIN LISPRO 12 UNITS: 100 INJECTION, SOLUTION INTRAVENOUS; SUBCUTANEOUS at 18:31

## 2023-03-06 RX ADMIN — TICAGRELOR 90 MG: 90 TABLET ORAL at 08:36

## 2023-03-06 RX ADMIN — BUSPIRONE HYDROCHLORIDE 7.5 MG: 5 TABLET ORAL at 08:35

## 2023-03-06 RX ADMIN — BUSPIRONE HYDROCHLORIDE 7.5 MG: 5 TABLET ORAL at 13:55

## 2023-03-06 RX ADMIN — BUMETANIDE 3 MG: 1 TABLET ORAL at 08:35

## 2023-03-06 RX ADMIN — HEPARIN SODIUM 5000 UNITS: 5000 INJECTION INTRAVENOUS; SUBCUTANEOUS at 21:39

## 2023-03-06 RX ADMIN — ASPIRIN 81 MG: 81 TABLET, CHEWABLE ORAL at 08:35

## 2023-03-06 RX ADMIN — Medication 60 MG: at 08:34

## 2023-03-06 RX ADMIN — ATORVASTATIN CALCIUM 80 MG: 80 TABLET, FILM COATED ORAL at 21:39

## 2023-03-06 RX ADMIN — HEPARIN SODIUM 5000 UNITS: 5000 INJECTION INTRAVENOUS; SUBCUTANEOUS at 13:56

## 2023-03-06 RX ADMIN — GABAPENTIN 300 MG: 300 CAPSULE ORAL at 08:36

## 2023-03-06 RX ADMIN — SODIUM CHLORIDE, PRESERVATIVE FREE 10 ML: 5 INJECTION INTRAVENOUS at 21:39

## 2023-03-06 RX ADMIN — Medication 1.6 ML: at 17:50

## 2023-03-06 RX ADMIN — INSULIN GLARGINE 57 UNITS: 100 INJECTION, SOLUTION SUBCUTANEOUS at 21:44

## 2023-03-06 RX ADMIN — TAMSULOSIN HYDROCHLORIDE 0.4 MG: 0.4 CAPSULE ORAL at 08:35

## 2023-03-06 RX ADMIN — GUAIFENESIN 600 MG: 600 TABLET, EXTENDED RELEASE ORAL at 08:35

## 2023-03-06 RX ADMIN — INSULIN LISPRO 12 UNITS: 100 INJECTION, SOLUTION INTRAVENOUS; SUBCUTANEOUS at 21:43

## 2023-03-06 RX ADMIN — Medication 60 MG: at 21:41

## 2023-03-06 RX ADMIN — INSULIN GLARGINE 55 UNITS: 100 INJECTION, SOLUTION SUBCUTANEOUS at 08:52

## 2023-03-06 RX ADMIN — Medication 3 MG: at 22:15

## 2023-03-06 RX ADMIN — ALLOPURINOL 100 MG: 100 TABLET ORAL at 08:36

## 2023-03-06 RX ADMIN — TICAGRELOR 90 MG: 90 TABLET ORAL at 21:39

## 2023-03-06 RX ADMIN — GUAIFENESIN 600 MG: 600 TABLET, EXTENDED RELEASE ORAL at 21:39

## 2023-03-06 RX ADMIN — CARVEDILOL 3.12 MG: 3.12 TABLET, FILM COATED ORAL at 21:43

## 2023-03-06 RX ADMIN — BUMETANIDE 3 MG: 1 TABLET ORAL at 18:30

## 2023-03-06 RX ADMIN — DOCUSATE SODIUM 100 MG: 100 CAPSULE, LIQUID FILLED ORAL at 08:35

## 2023-03-06 RX ADMIN — HEPARIN SODIUM 5000 UNITS: 5000 INJECTION INTRAVENOUS; SUBCUTANEOUS at 05:55

## 2023-03-06 RX ADMIN — BUSPIRONE HYDROCHLORIDE 7.5 MG: 5 TABLET ORAL at 21:39

## 2023-03-06 RX ADMIN — SALINE NASAL SPRAY 1 SPRAY: 1.5 SOLUTION NASAL at 08:36

## 2023-03-06 RX ADMIN — INSULIN LISPRO 4 UNITS: 100 INJECTION, SOLUTION INTRAVENOUS; SUBCUTANEOUS at 12:25

## 2023-03-06 RX ADMIN — PANTOPRAZOLE SODIUM 40 MG: 40 TABLET, DELAYED RELEASE ORAL at 05:55

## 2023-03-06 ASSESSMENT — ENCOUNTER SYMPTOMS
NAUSEA: 0
WHEEZING: 0
VOMITING: 0
ABDOMINAL PAIN: 0
CONSTIPATION: 0

## 2023-03-06 ASSESSMENT — PAIN SCALES - GENERAL: PAINLEVEL_OUTOF10: 0

## 2023-03-06 NOTE — PROGRESS NOTES
03/06/23 1217   Encounter Summary   Encounter Overview/Reason  Volunteer Encounter   Service Provided For: Patient   Referral/Consult From: Nhi   Last Encounter  03/06/23  (V)   Spiritual/Emotional needs   Type Spiritual Support   Rituals, Rites and Sacraments   Type [employer]

## 2023-03-06 NOTE — CARE COORDINATION
I called Dr Sana Ruiz office to see if he follows at [facility]. I spoke with the nurse Valera Homans. She states that Dr Suri Ayala, Dr Justa Casanova, and Dr Negrita Diez go to [facility]. Dr Jenny Gunn goes to [facility], [address], and [address] [place] [address]. Ibis Kohli-Dr Pablo Jorgensen from the same office can follow. I informed patient of the information I was able to gather. She called her sister, [facility] to ask for assistance in choosing facility. Hailey stated she wanted to make a couple phone calls and would let us know. [facility] is being realistic about transportation and distance for transport, once she is out of [address] [place] [address].  Electronically signed by Elisa Mares RN on 3/6/2023 at 3:10 PM

## 2023-03-06 NOTE — CARE COORDINATION
Patient and her sister Gordon Mann would like \[facility] [place] [facility]\"" to submit for [facility] with Dr Tomasz Ramirez to follow.  Electronically signed by Sindi Duarte RN on 3/6/2023 at 4:15 PM

## 2023-03-06 NOTE — PROGRESS NOTES
Per Dr. Cierra Bermudez, RN notified, we don't have the means to do a fistulogram during IR suite remodel. Please refer the patient to Dr. Jett Soto who placed the fistula or schedule as outpatient when IR room completed.

## 2023-03-06 NOTE — CARE COORDINATION
ONGOING DISCHARGE PLAN:    Patient is alert and oriented x4. Spoke with patient regarding discharge plan and patient confirms that plan is still [address]. Nephrology notes state a MWF ultrafiltration schedule. I discussed with patient HD placement. She states no to [facility] [address]. She would like us to submit for [facility]. [facility] is another option. Labs to submit for HD slot is being entered by Dr Reinier Handy. Tunneled cath placed by IR. Unable to access fistula. 1st treatment was on Friday 3/3. Oral Bumex 3 mg BID, 95% on 3.5L    Will continue to follow for additional discharge needs.     Electronically signed by Raghavendra Abel RN on 3/6/2023 at 9:38 AM

## 2023-03-06 NOTE — PROGRESS NOTES
Dr. Deon Hernadez paged regarding IR unable to complete the fistulogram ordered due to the IR room unavailable for up to 5 weeks.

## 2023-03-06 NOTE — CARE COORDINATION
I called to update Karol with [facility] that we are working on a hemodialysis slot. She will submit for extension of precert. Electronically signed by Jasmin Pfeiffer RN on 3/6/2023 at 11:00 AM

## 2023-03-06 NOTE — PROGRESS NOTES
Physical Therapy  Facility/Department: [facility] CARE  Physical Therapy  Name: Dipesh Johnston  : 1960  MRN: [id number]  Date of Service: 3/6/2023    Discharge Recommendations:  Patient would benefit from continued therapy after discharge          Patient Diagnosis(es): The encounter diagnosis was Congestive heart failure, unspecified HF chronicity, unspecified heart failure type ([address] Past Medical History:  has a past medical history of Arthritis, Backache, unspecified, Cerebral artery occlusion with cerebral infarction ([address] CHF (congestive heart failure) ([address] Chronic kidney disease, Coronary atherosclerosis of artery bypass graft, COVID, Cramp of limb, Gallstones, Hemodialysis patient ([address] Hyperlipidemia, Hypertension, Insomnia, Neuromuscular disorder ([address] Pneumonia, Psychiatric problem, Thyroid disease, Type II or unspecified type diabetes mellitus with renal manifestations, not stated as uncontrolled(250.40), Type II or unspecified type diabetes mellitus without mention of complication, not stated as uncontrolled, and Unspecified vitamin D deficiency. Past Surgical History:  has a past surgical history that includes Coronary artery bypass graft; Knee arthroscopy; Carpal tunnel release; Breast surgery; Tonsillectomy; Hand surgery; Ankle fracture surgery; Cholecystectomy, open (N/A); IR TUNNELED CVC PLACE WO SQ PORT/PUMP > 5 YEARS (2021); AV fistula creation (2021); Dialysis fistula creation (Left, 2021); other surgical history (2022); back surgery; Colonoscopy; eye surgery; fracture surgery; Cardiac surgery; Cardiac catheterization (2023); and IR TUNNELED CVC PLACE WO SQ PORT/PUMP > 5 YEARS (3/3/2023).     Assessment   Assessment: The patient demonstrates impaired activity tolerance and endurance 2* acute respiratory failure with hypoxia  Treatment Diagnosis: Impaired endurance and mobility 2* respiratory failure  Therapy Prognosis: Fair  Decision Making: Medium Complexity  Exam: ROM, Observational strength assessment, Bed mobility  Clinical Presentation: pt is alert, pleasant, anxious  Requires PT Follow-Up: Yes  Activity Tolerance  Activity Tolerance: Patient tolerated treatment well;Treatment limited secondary to medical complications (SOB destating with activity)     Plan   Physcial Therapy Plan  General Plan: 5-7 times per week  Specific Instructions for Next Treatment: Bed mobility, Supine/seated Exercise; Diaphragmatic breathing exercises  Current Treatment Recommendations: Strengthening, Balance training, Functional mobility training, Transfer training, Endurance training, Gait training, Patient/Caregiver education & training, Safety education & training, Equipment evaluation, education, & procurement, Therapeutic activities  Safety Devices  Type of Devices: All fall risk precautions in place, Bed alarm in place, Gait belt, Left in bed     Restrictions  Restrictions/Precautions  Restrictions/Precautions: Fall Risk, General Precautions  Required Braces or Orthoses?: Yes (Lymphedema)     Subjective   Pain: Denies pain  General  Chart Reviewed: Yes  Patient assessed for rehabilitation services?: Yes  Additional Pertinent Hx: The patient is a 57 y.o.  female who presented with worsening shortness of breath for the past 3 days. She also has had diarrhea. Pt complains of lower leg swelling though this is a chronic finding. Pt came from nursing facility. She was desatting to the 80s on home O2. Pt required 6L in the ER. CXR showed interstitial opacities at the bases and elevated pro-bnp. The patient was seen and examined at bedside. She feels better on 6L, denies fevers. she is admitted to the hospital for acute hypoxic respiratory failure & CHF exacerbation. Family / Caregiver Present: No  Referring Practitioner: Michele Kc MD  Referral Date : 02/27/23  Diagnosis: Acute Respiratory failure with hypoxia;  Acute on chronic heart failure, unspecified heart failure type  General Comment  Comments: Agreeable to work with Mancil Cast. Pt's HR 61bpm and SPO2 at 96% on 3.5L/min resting. (B) AGUSTIN stocking pablo [facility] pt sitting EOB. Social/Functional History  Social/Functional History  Lives With: Parent (Mother-caretaker for mom)  Type of Home: Condo  Home Layout: One level  Home Access: Ramped entrance  Bathroom Shower/Tub: Walk-in shower, Shower chair with back, Curtain  Bathroom Toilet: Handicap height  Bathroom Equipment: Grab bars in shower, Shower chair, Hand-held shower  Home Equipment: Cane, Rollator, Walker, rolling, Oxygen, Wheelchair-manual (O2 3-4L)  ADL Assistance: Independent  Homemaking Assistance: Needs assistance (Sisters assisting)  Homemaking Responsibilities: No  Ambulation Assistance: Independent (with use of rollator)  Transfer Assistance: Independent  Active : No  Patient's  Info: sisters assist with transportation, med cab to and from dialysis  Mode of Transportation:  (Pt. can return back to [facility], in [place])  Occupation: On disability  IADL Comments: Pt sleeps in adjustable bed  Additional Comments: Pt recently at [facility] x2-3 weeks. Admitted from facility on this admission    Objective   Heart Rate: 55  Heart Rate Source: Monitor  BP: 134/63  BP Location: Right upper arm  BP Method: Automatic  MAP (Calculated): 87  Resp: 19  SpO2: 95 %  O2 Device: Nasal cannula        Bed mobility  Rolling to Left: Stand by assistance  Rolling to Right: Stand by assistance  Supine to Sit: Supervision  Sit to Supine: Supervision  Scooting: Stand by assistance (Hips to EOB)  Bed Mobility Comments: Bed mobiity completed with HOB elevated no HR's to simulate home set up( has adjustable mattress no HR's)  Transfers  Sit to Stand: Contact guard assistance  Stand to Sit: Contact guard assistance  Ambulation  Surface: Level tile  Device: Rolling Walker  Assistance: Contact guard assistance  Quality of Gait: slow gait, shaky however no LOB. Pt experiences feeling of being light headed and increase SOB. Destated from 93% sitting EOB to 76% on 4L  Gait Deviations: Slow Annie;Decreased step length;Decreased step height  Distance: 3steps fwd/3steps retro back to bed. (x2 attempts)  Comments: Amb with RW completed twice with seated rest break between. Not able to leave pt up in chair due to x-ray entering room(taking chest x-ray)  Stairs/Curb  Stairs?: No     Balance  Posture: Fair  Sitting - Static: Good  Sitting - Dynamic: Good  Standing - Static: Fair;- (w/RW)  Standing - Dynamic: Fair;- (w/RW)  A/AROM Exercises: (B) LE seated ex x 15  Resistive Exercises: (B) LE seated lime green t-band ex x 15, hip adduction pebbles using pillow 15x 3sec hold  Dynamic Sitting Balance Exercises: Pt tolerated sitting up at EOB x 40mins while performing various LE strengthening ex.   Breathing Techniques: diaphragmatic breathing and pursed lip breathing techniques      AM-PAC Basic Mobility - Inpatient   How much help is needed turning from your back to your side while in a flat bed without using bedrails?: A Little  How much help is needed moving from lying on your back to sitting on the side of a flat bed without using bedrails?: A Little (pt has adjustable bed at home, completed task with elevated HOB)  How much help is needed moving to and from a bed to a chair?: A Little  How much help is needed standing up from a chair using your arms?: A Little  How much help is needed walking in hospital room?: A Little  How much help is needed climbing 3-5 steps with a railing?: Total  AM-PAC Inpatient Mobility Raw Score : 16  AM-PAC Inpatient T-Scale Score : 40.78  Mobility Inpatient CMS 0-100% Score: 54.16  Mobility Inpatient CMS G-Code Modifier : CK     Goals  Short Term Goals  Time Frame for Short Term Goals: 8 visits  Short Term Goal 1: pt to demo functional bed mobility with Jose  Short Term Goal 2: pt to tolerate 30 minutes of therapeutic activity and exercise while maintaining SpO2 of 90% or greater  Short Term Goal 3: Pt to demo good technique for Core and BLE strengthening exercises  Short Term Goal 4: PT to assess OOB transfers, gait, and stair climbing when medically appropriate         03/06/23 0858   Education   Education Given To Patient   Education Provided Transfer Training;Mobility Training   Education Provided Comments Benefits of sitting up in chair vs laying in bed   Education Method Verbal   Barriers to Learning None   Education Outcome Verbalized understanding         Therapy Time   Individual Concurrent Group Co-treatment   Time In 0855         Time Out 0945         Minutes [address], [place]

## 2023-03-06 NOTE — PLAN OF CARE
Problem: Discharge Planning  Goal: Discharge to home or other facility with appropriate resources  3/6/2023 0417 by Ela Uribe RN  Outcome: Progressing  Flowsheets (Taken 3/5/2023 0342 by Araseli Leyva RN)  Discharge to home or other facility with appropriate resources:   Identify barriers to discharge with patient and caregiver   Arrange for interpreters to assist at discharge as needed  3/5/2023 1439 by Karen Peralta RN  Outcome: Progressing     Problem: Safety - Adult  Goal: Free from fall injury  3/6/2023 0417 by Ela Uribe RN  Outcome: Progressing  3/5/2023 1439 by Karen Peralta RN  Outcome: Progressing     Problem: ABCDS Injury Assessment  Goal: Absence of physical injury  3/6/2023 0417 by Ela Uribe RN  Outcome: Progressing  Flowsheets (Taken 3/1/2023 0846 by Uma Brush RN)  Absence of Physical Injury: Implement safety measures based on patient assessment  3/5/2023 1439 by Karen Peralta RN  Outcome: Progressing     Problem: Chronic Conditions and Co-morbidities  Goal: Patient's chronic conditions and co-morbidity symptoms are monitored and maintained or improved  3/6/2023 0417 by Ela Uribe RN  Outcome: Progressing  Flowsheets (Taken 3/3/2023 0759 by Zara Hutchison RN)  Care Plan - Patient's Chronic Conditions and Co-Morbidity Symptoms are Monitored and Maintained or Improved: Monitor and assess patient's chronic conditions and comorbid symptoms for stability, deterioration, or improvement  3/5/2023 1439 by Karen Peralta RN  Outcome: Progressing     Problem: Skin/Tissue Integrity  Goal: Absence of new skin breakdown  Description: 1. Monitor for areas of redness and/or skin breakdown  2. Assess vascular access sites hourly  3. Every 4-6 hours minimum:  Change oxygen saturation probe site  4.   Every 4-6 hours:  If on nasal continuous positive airway pressure, respiratory therapy assess nares and determine need for appliance change or resting period. 3/6/2023 0417 by Koki Bennett RN  Outcome: Progressing  3/5/2023 1439 by Bigg Multani RN  Outcome: Progressing     Problem: Pain  Goal: Verbalizes/displays adequate comfort level or baseline comfort level  3/6/2023 0417 by Koki Bennett RN  Outcome: Progressing  Flowsheets (Taken 3/6/2023 0417)  Verbalizes/displays adequate comfort level or baseline comfort level:   Encourage patient to monitor pain and request assistance   Assess pain using appropriate pain scale   Administer analgesics based on type and severity of pain and evaluate response  Note: Pain denies pain at this time. Monitoring vital signs and status. Call light within reach. Needs addressed.   3/5/2023 1439 by Bigg Multani RN  Outcome: Progressing

## 2023-03-06 NOTE — PLAN OF CARE
Problem: Discharge Planning  Goal: Discharge to home or other facility with appropriate resources  3/6/2023 1610 by Marina Joseph RN  Outcome: Progressing     Problem: Safety - Adult  Goal: Free from fall injury  3/6/2023 1610 by Marina Joseph RN  Outcome: Progressing     Problem: ABCDS Injury Assessment  Goal: Absence of physical injury  3/6/2023 1610 by Marina Joseph RN  Outcome: Progressing     Problem: Chronic Conditions and Co-morbidities  Goal: Patient's chronic conditions and co-morbidity symptoms are monitored and maintained or improved  3/6/2023 1610 by Marina Joseph RN  Outcome: Progressing     Problem: Skin/Tissue Integrity  Goal: Absence of new skin breakdown  Description: 1. Monitor for areas of redness and/or skin breakdown  2. Assess vascular access sites hourly  3. Every 4-6 hours minimum:  Change oxygen saturation probe site  4. Every 4-6 hours:  If on nasal continuous positive airway pressure, respiratory therapy assess nares and determine need for appliance change or resting period.   3/6/2023 1610 by Marina Joseph RN  Outcome: Progressing     Problem: Pain  Goal: Verbalizes/displays adequate comfort level or baseline comfort level  3/6/2023 1610 by Marina Joseph RN  Outcome: Progressing     Problem: Nutrition Deficit:  Goal: Optimize nutritional status  Outcome: Progressing

## 2023-03-06 NOTE — PROGRESS NOTES
[address]  Family Medicine        Progress Note      Date:   3/6/2023  Patient name:  Taylor Cr  Date of admission:  2/27/2023  9:54 AM  MRN:   [id number]  YOB: 1960      Brief HPI    Respiratory failure secondary to CHF exacerbation, renal failure, requiring ultrafiltration    ASSESSMENT/PLAN     Hospital Problems             Last Modified POA    * (Principal) Acute respiratory failure with hypoxia ([address] 2/27/2023 Yes    Type 2 diabetes mellitus with hyperglycemia, with long-term current use of insulin ([address] 2/27/2023 Yes    Acute on chronic heart failure, unspecified heart failure type ([address] 2/27/2023 Yes    Diarrhea of presumed infectious origin 2/27/2023 Yes    Epistaxis 3/5/2023 Yes    CKD (chronic kidney disease) stage 4, GFR 15-29 ml/min ([address] 2/27/2023 Yes     1. MWF dialysis with ultrafiltration  2.increase Lantus to 57 units twice daily with a high dose scale  3.  BB dose reduced due to bradycardia, will discontinue amlodipine  4. Continue bumex  6. Cardio, pulm, nephro on board  7. Miralax, colace daily  8. Repeat chest x-ray    DVT:heparin subcu  Dispo: If patient is able to get a spot for dialysis will discharge today    SUBJECTIVE:     Patient was seen and examined at bedside patient, breathing is comfortable on nasal cannula, still having some cough but that is improved from admission. Patient is having normal BMs daily now with no bowel regimen. Nosebleed resolved PT had a BM today. Consultant notes, labs & imaging reviewed. Review of Systems   Constitutional:  Negative for chills, diaphoresis and fever. Respiratory:  Negative for wheezing. Dyspnea and cough have improving   Cardiovascular:  Negative for palpitations and leg swelling. Gastrointestinal:  Negative for abdominal pain, constipation, nausea and vomiting. Genitourinary:  Negative for difficulty urinating and dysuria.    Neurological:  Negative for dizziness, light-headedness and headaches. OBJECTIVE:     /63   Pulse 55   Temp 97.4 °F (36.3 °C) (Oral)   Resp 19   Ht 5' 5\" (1.651 m)   Wt 245 lb 6 oz (111.3 kg)   SpO2 95%   BMI 40.83 kg/m²      Physical Exam  Vitals and nursing note reviewed. Eyes:      Extraocular Movements: Extraocular movements intact. Conjunctiva/sclera: Conjunctivae normal.   Cardiovascular:      Rate and Rhythm: Normal rate and regular rhythm. Pulses: Normal pulses. Heart sounds: Normal heart sounds. Pulmonary:      Breath sounds: Rales present. No wheezing. Comments: Good air movement in all lung fields however there are rales right lower lung field  Abdominal:      General: There is no distension. Palpations: Abdomen is soft. Tenderness: There is no abdominal tenderness. There is no guarding. Musculoskeletal:      Right lower leg: No edema. Left lower leg: No edema. Comments: Edema is improving   Neurological:      General: No focal deficit present. Mental Status: She is alert and oriented to person, place, and time.         Intake/Output:    Intake/Output Summary (Last 24 hours) at 3/6/2023 1049  Last data filed at 3/5/2023 2040  Gross per 24 hour   Intake 900 ml   Output 1175 ml   Net -275 ml       Laboratory Testing:  CBC:   Recent Labs     03/06/23  0925   WBC 9.5   HGB 11.4*   *       BMP:    Recent Labs     03/04/23  0552 03/05/23  1326 03/06/23  0925    142 141   K 4.4 4.1 4.3    107 105   CO2 26 25 24   BUN 85* 66* 69*   CREATININE 3.50* 2.75* 2.65*   GLUCOSE 98 230* 307*       Magnesium:   Lab Results   Component Value Date/Time    MG 2.2 02/12/2023 06:07 AM     Phosphorus:   Lab Results   Component Value Date/Time    PHOS 4.0 01/27/2023 03:09 AM     Ionized Calcium:   Lab Results   Component Value Date/Time    CAION 1.04 04/18/2022 12:27 PM      PT/INR:    Lab Results   Component Value Date/Time    PROTIME 13.3 03/03/2023 05:47 AM    PROTIME 16.4 08/27/2015 12:00 AM    PROTIME 16.4 08/27/2015 12:00 AM    INR 1.0 03/03/2023 05:47 AM     PTT:    Lab Results   Component Value Date/Time    APTT 27.5 11/10/2021 03:50 PM           Evie Roman MD   3/6/2023 10:49 AM

## 2023-03-06 NOTE — PROGRESS NOTES
Department of Internal Medicine  Nephrology Chata Price MD  Progress Note    Reason for consultation: Management of chronic kidney disease stage IV. Consulting physician: Kenan Zamora MD.    Interval history: Patient was seen and examined-Last hemodialysis On Saturday 1.5 kg removed. She had mild fatigue associated with IDH with dialysis. Renal function labs stable creatinine 2.65 with BUN of 69 mg/dl. Shortness of breath is improving and chest x-ray shows decreasing interstitial edema    patient has a functional left brachiocephalic AV fistula which was not successfully cannulated due to infiltration and hence she had a right IJ tunneled catheter placed. History of present illness: This is a 57 y.o. female with a significant past medical history of Obesity, type 2 diabetes mellitus with diabetic nephropathy, coronary artery disease [s/p CABG x3], systemic hypertension, heart failure with preserved ejection fraction [HFrEF] and chronic kidney disease stage IV secondary to diabetic and hypertensive nephropathy [was on hemodialysis for several months until discontinuation in August 2022 due to some recovery of renal function], who was sent in from the [address] [place] [address] for further evaluation of hypoxia. She stated that she has had progressively worsening shortness of breath and cough over the past 3 days prior to presentation and had diarrhea for 2 days. She also complains of general body aches, fatigue and dysuria. She is on chronic oxygen at the facility usually at 3 L/min flow. Oxygen saturation was in the 80% range even with increasing O2 flow to 6 L/min and hence presentation to the hospital.  Vital signs were stable at presentation with blood pressure 130/68 mmHg. Chest x-ray at presentation showed mild pulmonary vascular congestion with increased interstitial opacities at the lung bases.   Laboratory studies revealed BUN/creatinine 50/2.39 mg/dL and hence nephrology consultation. Scheduled Meds:   polyethylene glycol  17 g Oral Daily    docusate sodium  100 mg Oral BID    busPIRone  7.5 mg Oral TID    carvedilol  3.125 mg Oral BID    insulin glargine  55 Units SubCUTAneous BID    insulin lispro  0-16 Units SubCUTAneous 4x Daily AC & HS    guaiFENesin  600 mg Oral BID    bumetanide  3 mg Oral BID    dextromethorphan  60 mg Oral 2 times per day    sodium chloride flush  5-40 mL IntraVENous 2 times per day    heparin (porcine)  5,000 Units SubCUTAneous 3 times per day    allopurinol  100 mg Oral Daily    aspirin  81 mg Oral Daily    atorvastatin  80 mg Oral Nightly    gabapentin  300 mg Oral Daily    pantoprazole  40 mg Oral QAM AC    tamsulosin  0.4 mg Oral Daily    ticagrelor  90 mg Oral BID     Continuous Infusions:   sodium chloride      dextrose       Physical Exam:    VITALS:  BP (!) 153/61   Pulse 63   Temp 98 °F (36.7 °C) (Oral)   Resp 17   Ht 5' 5\" (1.651 m)   Wt 245 lb 6 oz (111.3 kg)   SpO2 97%   BMI 40.83 kg/m²   TEMPERATURE:  Current - Temp: 98 °F (36.7 °C);  Max - Temp  Av.8 °F (36.6 °C)  Min: 97.4 °F (36.3 °C)  Max: 98 °F (36.7 °C)  RESPIRATIONS RANGE: Resp  Av.5  Min: 17  Max: 20  PULSE RANGE: Pulse  Av.6  Min: 55  Max: 66  BLOOD PRESSURE RANGE:  Systolic (60JGX), XQZ:516 , Min:107 , ETD:862 ; Diastolic (72XKG), CXA:23, Min:53, Max:63  PULSE OXIMETRY RANGE: SpO2  Av %  Min: 95 %  Max: 97 %  24HR INTAKE/OUTPUT:    Intake/Output Summary (Last 24 hours) at 3/6/2023 1428  Last data filed at 3/6/2023 0843  Gross per 24 hour   Intake 540 ml   Output 1175 ml   Net -635 ml       Constitutional: alert, appears stated age, and cooperative    Skin: Skin color, texture, turgor normal. No rashes or lesions    Head: Normocephalic, without obvious abnormality, atraumatic     Cardiovascular/Edema: regular rate and rhythm, S1, S2 normal, no murmur, click, rub or gallop    Respiratory: Diminished breath sounds with bilateral basal Rales    Abdomen: soft, non-tender; bowel sounds normal; no masses,  no organomegaly    Back: symmetric, no curvature. ROM normal. No CVA tenderness. Extremities: Bilateral pitting pedal edema 2+; left arm AV fistula with good thrill and bruit. Neuro:  Grossly normal    CBC:   Recent Labs     03/04/23  0552 03/06/23  0925   WBC 6.7 9.5   HGB 11.0* 11.4*    131*       BMP:    Recent Labs     03/04/23  0552 03/05/23  1326 03/06/23  0925    142 141   K 4.4 4.1 4.3    107 105   CO2 26 25 24   BUN 85* 66* 69*   CREATININE 3.50* 2.75* 2.65*   GLUCOSE 98 230* 307*       Lab Results   Component Value Date/Time    NITRU NEGATIVE 02/27/2023 03:47 PM    COLORU Yellow 02/27/2023 03:47 PM    PHUR 6.5 02/27/2023 03:47 PM    WBCUA 0 TO 2 02/27/2023 03:47 PM    RBCUA 0 TO 2 02/27/2023 03:47 PM    MUCUS 1+ 02/07/2023 03:00 PM    TRICHOMONAS NOT REPORTED 11/14/2021 02:05 AM    YEAST FEW 02/07/2023 03:00 PM    BACTERIA None 02/27/2023 03:47 PM    SPECGRAV 1.011 02/27/2023 03:47 PM    LEUKOCYTESUR NEGATIVE 02/27/2023 03:47 PM    UROBILINOGEN Normal 02/27/2023 03:47 PM    BILIRUBINUR NEGATIVE 02/27/2023 03:47 PM    GLUCOSEU NEGATIVE 02/27/2023 03:47 PM    KETUA NEGATIVE 02/27/2023 03:47 PM    AMORPHOUS 1+ 02/25/2023 03:15 AM     Urine Sodium:     Lab Results   Component Value Date/Time    JASON 47 11/14/2021 02:04 AM     Urine Chloride:    Lab Results   Component Value Date/Time    CLUR 26 11/14/2021 02:04 AM     Urine Protein:     Lab Results   Component Value Date/Time    TPU 20 11/12/2021 10:30 AM     Urine Creatinine:     Lab Results   Component Value Date/Time    LABCREA 65.4 01/26/2023 01:10 PM     IMPRESSION/RECOMMENDATIONS:      1. Chronic kidney disease stage IV [baseline serum creatinine 2 to 3 mg/dL]  in this patient is consistent with diabetic and hypertensive nephropathy as well as cardiorenal syndrome.   She has pulmonary edema, worsening renal function while on high dose diuretics and has indications for  initiation of acute dialysis/Ultrafiltration. Status post tunnel catheter placed on 3/3/2023 with first dialysis/UF on 3/3/2023. Left arm AVF unsuccessfully accessed due to infiltration. Plan:   Continue Bumex 3 mg p.o. twice daily. Monitor urine output closely. Basic metabolic profile daily. Plan for Intermittent ultrafiltration with HD Monday Wednesday Friday schedule. Ultrafiltration today 2 kg fluid removal    2. Shortness of breath - multifactorial etiology including secondary to decompensated diastolic heart failure. Daily weights. 1500 mls fluid restriction. 2 g sodium per day. 3.  Systemic hypertension - blood pressure control is adequate. 4.  Borderline hyperkalemia -stable    5. Right pleural effusion and pulmonary vascular congestion on x-ray 3/3/2023-improving       We will order a fistulogram prior to discharge to assess AV fistula. R/O access stenosis. Patient will require outpatient dialysis given risk for uremic complications and recurrent fluid overload   working on an outpatient chair.   Patient's preference MD LESIA Massey  Attending Nephrologist  3/6/2023 2:28 PM

## 2023-03-06 NOTE — PROGRESS NOTES
Lizbeth in dialysis notified that we are unable to complete fistulogram during IR suite down time, referred them back to Dr. Katya Agarwal who originally placed it.

## 2023-03-06 NOTE — DIALYSIS
HEMODIALYSIS PRE-TREATMENT NOTE    Patient Identifiers prior to treatment: name, , mrn    Isolation Required: esbl, mdro, vre, esbl, mrsa                     Isolation Type: contact       (please document if patient is being managed as a PUI/COVID-19 patient)        Hepatitis status: immune                          Date Drawn                             Result  Hepatitis B Surface Antigen 3.     negative                     Hepatitis B Surface Antibody 3. 60.08        Hepatitis B Core Antibody            How was Hepatitis Status verified: epic results     Was a copy of the labs you documented provided to facility for the patient's chart: yes    Hemodialysis orders verified: yes    Access Within normal limits ( I.e. s/s of infection,...): wnl     Pre-Assessment completed: yes    Pre-dialysis report received from: marzena fiore                      Time: 3959

## 2023-03-06 NOTE — PROGRESS NOTES
[address]   INPATIENT OCCUPATIONAL THERAPY  PROGRESS NOTE  Date: 3/6/2023  Patient Name: Altagracia Palafox       Room: 7181/3026-27  MRN: [id number]    : 1960  (57 y.o.)  Gender: female   Referring Practitioner: Abiola Knight MD  Diagnosis: Hypoxia    Restrictions/Precautions  Restrictions/Precautions  Restrictions/Precautions: Fall Risk;General Precautions  Required Braces or Orthoses?: Yes (lymphedema)    Vitals  O2 Device: Nasal cannula  Comment: 3.5L. O2 montitored throghout, 86% lowest drop was with mobility/transfer into shower, 89-95% throughout full ADL session. Subjective  Subjective  Subjective: Pt requesting to take shower for OT session. \"I feel okay\" pt reports once transferred and seated onto shower chair. Subjective  Pain: Denies pain  Comments: DEDE Multani'd treatment, pt pleasant and motivated. Objective  Orientation  Overall Orientation Status: Within Functional Limits  Cognition  Overall Cognitive Status: WFL    Activities of Daily Living  ADL  Feeding: Setup  Feeding Skilled Clinical Factors: per pt report. Grooming: Setup  UE Bathing: Supervision, Setup  UE Bathing Skilled Clinical Factors: seated on shower chair with back. LE Bathing: Maximum assistance  LE Bathing Skilled Clinical Factors: Assist with BLEs, back of upper thighs and buttocks. Pt completed shower this date with decreased space for manuvering, no long handled sponge present at this time however would benefit from in future. UE Dressing: Minimal assistance  LE Dressing: Moderate assistance  LE Dressing Skilled Clinical Factors: TA for doffing/donning TEDs, A to doff footie however able to maria de jesus with sock aide. Pt able to bend forward from sitting position at EOB to initiate and thread pull up brief with SBA for safety, with tolerates well with O2 and breathing following short upright rest break. CGA in standing to pull up over hips.   Toileting: Dependent/Total  Toileting Skilled Clinical Factors: External catheter  Additional Comments: Full Shower completed this date. Balance  Balance  Sitting Balance: Stand by assistance  Standing Balance: Contact guard assistance  Standing Balance  Time: 1-2 min x2, <30 secs x2  Activity: functional mobility bed<>shower, LB bathing and dressing. Comment: no LOB    Transfers/Mobility  Bed mobility  Supine to Sit: Supervision  Sit to Supine: Supervision  Scooting: Stand by assistance (hips toward EOB)  Bed Mobility Comments: Bed mobiity completed with HOB elevated and hand rails. Transfers  Sit to stand: Contact guard assistance  Stand to sit: Contact guard assistance  Transfer Comments: with RW, good hand placement noted. Toilet Transfers  Toilet Transfers Comments: Pt deferred due to low height of toilet this date. Shower Transfers  Shower - Transfer From: Cesia Monahan - Transfer Type: To and From  Shower - Transfer To:  Shower seat with back  Shower - Technique: Ambulating (to stand step with grab bars for support and over incline.)  Shower Transfers: Contact Guard;Verbal cues (increased time.)    Functional Mobility  Functional - Mobility Device: Rolling Walker  Activity: To/from bathroom  Assist Level: Contact guard assistance  Functional Mobility Comments: some lightheadness reported however completes with rest breaks, minor cueing on RW placement, and increased time       Patient Education  Patient Education  Education Given To: Patient  Education Provided: Role of Therapy, Plan of Care, Energy Conservation  Education Method: Verbal  Barriers to Learning: None  Education Outcome: Verbalized understanding, Continued education needed, Demonstrated understanding      Goals  Short Term Goals  Time Frame for Short Term Goals: By discharge  Short Term Goal 1: Pt will complete bed mobility to sitting EOB with Mod I to sitting EOB for 10+ minutes unsupported during self care tasks while maintaining SpO2 above 90%  Short Term Goal 2: Pt will complete lower body bathing/dressing with min A and Good safety with use of AE as needed  Short Term Goal 3: Pt will progress to functional transfers/mobility during self care tasks with SBA and Good safety with use of least restrictive device while maintaining Spo2 above 90%  Short Term Goal 4: Pt will verbalize/demonstrate Good understanding of energy conservation/fall prevention strategies to increase safety and independence with self care tasks  Short Term Goal 5: Pt will participate in 15+ minutes of therapeutic exercises/functional activities to increase safety and independence with self care and mobility  Occupational Therapy Plan  Times Per Week: 4-6  Current Treatment Recommendations: Self-Care / ADL, Strengthening, Balance training, Functional mobility training, Endurance training, Pain management, Safety education & training, Patient/Caregiver education & training, Equipment evaluation, education, & procurement, Home management training      Assessment  Activity Tolerance  Activity Tolerance: Patient Tolerated treatment well, Patient limited by fatigue  Assessment  Performance deficits / Impairments: Decreased ADL status, Decreased functional mobility , Decreased ROM, Decreased strength, Decreased safe awareness, Decreased endurance, Decreased balance, Decreased high-level IADLs, Decreased fine motor control, Decreased coordination  Treatment Diagnosis: Impaired self care status  Prognosis: Fair  Decision Making: Medium Complexity  Discharge Recommendations: Patient would benefit from continued therapy after discharge  OT Equipment Recommendations  Other: TBD  Safety Devices  Type of Devices:  All fall risk precautions in place, Bed alarm in place, Gait belt, Left in bed      AM-PAC Daily Activities Inpatient  AM-PAC Daily Activity - Inpatient   How much help is needed for putting on and taking off regular lower body clothing?: A Lot  How much help is needed for bathing (which includes washing, rinsing, drying)?: A Lot  How much help is needed for toileting (which includes using toilet, bedpan, or urinal)?: Total  How much help is needed for putting on and taking off regular upper body clothing?: A Little  How much help is needed for taking care of personal grooming?: A Little  How much help for eating meals?: A Little  AM-[facility] Inpatient Daily Activity Raw Score: 14  AM-PAC Inpatient ADL T-Scale Score : 33.39  ADL Inpatient CMS 0-100% Score: 59.67  ADL Inpatient CMS G-Code Modifier : CK    OT Minutes  OT Individual Minutes  Time In: 1033  Time Out: [address] [facility]  Minutes: 69      Electronically signed by DAYANA Ovalle on 3/6/2023 at 3:12 PM

## 2023-03-06 NOTE — PROGRESS NOTES
Jason Bhatt MD/Carter Ahn MD/ Cedric Werner MD/Jordan SHELBY AGACNP-BC, NP-C      Lisa Fox APRN NP-C     Luis Maurer APRN NP-C                                           Pulmonary Progress Note    Patient - Tadeo Price   Age - 57 y.o.   - 1960  MRN - [id number]  Acct # - [de-identified]  Date of Admission - 2023  9:54 AM    Consulting Service/Physician:       Primary Care Physician: AFSANEH Daley - CNP    SUBJECTIVE:     Chief Complaint:   Chief Complaint   Patient presents with    Shortness of Breath     Subjective:    She states she is doing ok, she has an occasional cough, less short of breath with exertion per RN. She is stable on low flow oxygen. No chest pain or pleuritic pain. Awaiting placement with hemodialysis chair, poss hemodialysis later today per [facility]  /63   Pulse 55   Temp 97.4 °F (36.3 °C) (Oral)   Resp 19   Ht 5' 5\" (1.651 m)   Wt 245 lb 6 oz (111.3 kg)   SpO2 95%   BMI 40.83 kg/m²   Wt Readings from Last 3 Encounters:   23 245 lb 6 oz (111.3 kg)   23 248 lb (112.5 kg)   23 248 lb 2.6 oz (112.6 kg)     I/O (24 Hours)    Intake/Output Summary (Last 24 hours) at 3/6/2023 0956  Last data filed at 3/5/2023 2040  Gross per 24 hour   Intake 900 ml   Output 1175 ml   Net -275 ml     Ventilator:      Exam:   Physical Exam   Constitutional:  Oriented to person, place, and time. Appears well-developed and well-nourished. Lying in bed in no distress  HENT: Unremarkable, nasal cannula in place  Head: Normocephalic and atraumatic. Eyes: EOM are normal. Pupils are equal, round, and reactive to light. Neck: Neck supple. Cardiovascular:  Regular rate and rhythm. Normal heart tones. No JVD.     Pulmonary/Chest: Effort normal and breath sounds diminished with few rales in bases, no wheezing or rhonchi, respirations easy and nonlabored at rest  Abdominal: Soft. Bowel sounds are normal. There is no tenderness. Musculoskeletal: Normal range of motion. Some generalized weakness  Neurological:patient is alert and oriented to person, place, and time. Skin: Skin is warm and dry.    Extremities: No edema   Infusions:      sodium chloride      dextrose       Meds:     Current Facility-Administered Medications:     sodium chloride (OCEAN, BABY AYR) 0.65 % nasal spray 1 spray, 1 spray, Each Nostril, Q4H PRN, Duncan Staples MD, 1 spray at 03/06/23 0836    polyethylene glycol (GLYCOLAX) packet 17 g, 17 g, Oral, Daily, Duncan Staples MD, 17 g at 03/05/23 0831    docusate sodium (COLACE) capsule 100 mg, 100 mg, Oral, BID, Duncan Staples MD, 100 mg at 03/06/23 0835    busPIRone (BUSPAR) tablet 7.5 mg, 7.5 mg, Oral, TID, Duncan Staples MD, 7.5 mg at 03/06/23 0835    carvedilol (COREG) tablet 3.125 mg, 3.125 mg, Oral, BID, Evangelina Allison MD    anticoagulant sodium citrate 4 % injection 1.6 mL, 1.6 mL, IntraCATHeter, PRN, Yosef Bedolla MD, 1.6 mL at 03/03/23 1736    anticoagulant sodium citrate 4 % injection 1.6 mL, 1.6 mL, IntraCATHeter, PRN, Yosef Bedolla MD, 1.6 mL at 03/03/23 1736    insulin glargine (LANTUS) injection vial 55 Units, 55 Units, SubCUTAneous, BID, Duncan Staples MD, 55 Units at 03/06/23 0852    insulin lispro (HUMALOG) injection vial 0-16 Units, 0-16 Units, SubCUTAneous, 4x Daily AC & HS, Duncan Staples MD, 12 Units at 03/05/23 2022    albuterol (PROVENTIL) nebulizer solution 2.5 mg, 2.5 mg, Nebulization, Q4H PRN, Vishnu Goff MD    melatonin tablet 3 mg, 3 mg, Oral, Nightly PRN, Duncan Staples MD, 3 mg at 03/05/23 2027    guaiFENesin [facility]) extended release tablet 600 mg, 600 mg, Oral, BID, Duncan Staples MD, 600 mg at 03/06/23 3456    benzonatate (TESSALON) capsule 100 mg, 100 mg, Oral, TID PRN, Duncan Staples MD    bumetanide (BUMEX) tablet 3 mg, 3 mg, Oral, BID, Lyndsay Wright, APRN - CNP, 3 mg at 03/06/23 0835    dextromethorphan (DELSYM) 30 MG/5ML extended release liquid 60 mg, 60 mg, Oral, 2 times per day, Rajwinder Stevens, APRN - CNP, 60 mg at 03/06/23 0834    sodium chloride flush 0.9 % injection 5-40 mL, 5-40 mL, IntraVENous, 2 times per day, Diane Busby MD, 10 mL at 03/04/23 0849    sodium chloride flush 0.9 % injection 5-40 mL, 5-40 mL, IntraVENous, PRN, Diane Busby MD    0.9 % sodium chloride infusion, , IntraVENous, PRN, Diane Busby MD    magnesium sulfate 2000 mg in water 50 mL IVPB, 2,000 mg, IntraVENous, PRN, Diane Busby MD    ondansetron (ZOFRAN-ODT) disintegrating tablet 4 mg, 4 mg, Oral, Q8H PRN, 4 mg at 02/28/23 1626 **OR** ondansetron (ZOFRAN) injection 4 mg, 4 mg, IntraVENous, Q6H PRN, Diane Busby MD    acetaminophen (TYLENOL) tablet 650 mg, 650 mg, Oral, Q6H PRN, 650 mg at 03/05/23 2027 **OR** acetaminophen (TYLENOL) suppository 650 mg, 650 mg, Rectal, Q6H PRN, Diane Busby MD    heparin (porcine) injection 5,000 Units, 5,000 Units, SubCUTAneous, 3 times per day, Diane Busby MD, 5,000 Units at 03/06/23 0555    allopurinol (ZYLOPRIM) tablet 100 mg, 100 mg, Oral, Daily, Diane Busby MD, 100 mg at 03/06/23 9706    aspirin chewable tablet 81 mg, 81 mg, Oral, Daily, Diane Busby MD, 81 mg at 03/06/23 0835    [Held by provider] amLODIPine (NORVASC) tablet 5 mg, 5 mg, Oral, Daily, Diane Busby MD, 5 mg at 03/02/23 0839    atorvastatin (LIPITOR) tablet 80 mg, 80 mg, Oral, Nightly, Diane Busby MD, 80 mg at 03/05/23 2029    gabapentin (NEURONTIN) capsule 300 mg, 300 mg, Oral, Daily, Diane Busby MD, 300 mg at 03/06/23 0836    glucose chewable tablet 16 g, 4 tablet, Oral, PRN, Diane Busby MD    dextrose bolus 10% 125 mL, 125 mL, IntraVENous, PRN **OR** dextrose bolus 10% 250 mL, 250 mL, IntraVENous, PRN, Diane Busby MD    glucagon (rDNA) injection 1 mg, 1 mg, SubCUTAneous, PRN, Diane Busby MD    dextrose 10 % infusion, , IntraVENous, Continuous PRN, Michele Kc MD    pantoprazole (PROTONIX) tablet 40 mg, 40 mg, Oral, QAM AC, Michele Kc MD, 40 mg at 03/06/23 0555    tamsulosin (FLOMAX) capsule 0.4 mg, 0.4 mg, Oral, Daily, Michele Kc MD, 0.4 mg at 03/06/23 8649    ticagrelor (BRILINTA) tablet 90 mg, 90 mg, Oral, BID, Michele Kc MD, 90 mg at 03/06/23 0836    albuterol (PROVENTIL) nebulizer solution 2.5 mg, 2.5 mg, Nebulization, Q4H PRN, Rno Irby MD, 2.5 mg at 02/28/23 0055    Lab Results:     Lab Results   Component Value Date    WBC 9.5 03/06/2023    HGB 11.4 (L) 03/06/2023    HCT 35.2 (L) 03/06/2023    MCV 97.0 03/06/2023     (L) 03/06/2023     Lab Results   Component Value Date    CALCIUM 8.5 (L) 03/05/2023     03/05/2023    K 4.1 03/05/2023    CO2 25 03/05/2023     03/05/2023    BUN 66 (H) 03/05/2023    CREATININE 2.75 (H) 03/05/2023     Lab Results   Component Value Date    INR 1.0 03/03/2023    PROTIME 13.3 03/03/2023       Radiology:   CXR ordered for today    3/6/23                                                       3/3/23  Less vasc congestion, still some to LLL, improved aeration  ASSESSMENT:       Acute/chronic diast heart failure with fluid volume overload  Bilat pleural effusions R>L sec to above  Acute on chronic hypoxic resp failure on 4L, baseline is 3L  CKD Stage IV  S/p tunnel catheter placement 3/3/23  ERICK  Restrictive pattern on PFTs  Hx of DVT  Chronic Lymphedema  Lifelone nonsmoke  Full code  PLAN:   Wean o2 to keep pulse ox >88%  Diurese/fluid removal per nephrology  On Brillenta, no plans for thoro at this time  PT/OT  Reviewed/interpreted cxr from this am, much improved  Discussed with RN  No objection to discharge to [facility] once arrangements are made  We will continue to follow along while inpatient        Electronically signed by AFSANEH Chaves CNP on 03/06/23     This progress note was completed using a voice transcription system.  Every effort was made to ensure accuracy. However, inadvertent computerized transcription errors may be present.     SACHA VAZQUEZ-BC, NP-C  [facility] Pulmonary, Critical Care & Sleep

## 2023-03-06 NOTE — PROGRESS NOTES
Comprehensive Nutrition Assessment    Type and Reason for Visit:  Initial, RD Nutrition Re-Screen/LOS    Nutrition Recommendations/Plan:   Will continue to provide 4 carbohydrate choices per tray with No Added Salt and 1500 ml fluid restrictions     Malnutrition Assessment:  Malnutrition Status: At risk for malnutrition (Comment) (03/06/23 1404)    Context:  Chronic Illness     Findings of the 6 clinical characteristics of malnutrition:  Energy Intake:  No significant decrease in energy intake  Weight Loss:  No significant weight loss     Body Fat Loss:  No significant body fat loss     Muscle Mass Loss:  No significant muscle mass loss    Fluid Accumulation:  Mild Extremities, Generalized   Strength:  Not Performed    Nutrition Assessment:    Pt was admitted due to acute on chronic heart failure. UF has been started. Pt has been consistently consuming more than 75% of food provided during her stay. Plan is for discharge to SNF. Nutrition Related Findings:    Glu 307, Meds: Reviewed, PMH:CVA, CHF, CKD ESRD, DM, BM 3/5 Wound Type: None       Current Nutrition Intake & Therapies:    Average Meal Intake: %     ADULT DIET; Regular; 4 carb choices (60 gm/meal); No Added Salt (3-4 gm); 1500 ml    Anthropometric Measures:  Height: 5' 5\" (165.1 cm)  Ideal Body Weight (IBW): 125 lbs (57 kg)    Admission Body Weight: 243 lb (110.2 kg)  Current Body Weight: 245 lb (111.1 kg),   IBW.  Weight Source: Bed Scale  Current BMI (kg/m2): 40.8  Usual Body Weight: 227 lb (103 kg) (6/22)  % Weight Change (Calculated): 7.9                    BMI Categories: Obese Class 3 (BMI 40.0 or greater)    Estimated Daily Nutrient Needs:  Energy Requirements Based On: Formula     Energy (kcal/day): Reynolds x 1-1.1= 3418-4347 kcal  Weight Used for Protein Requirements: Admission  Protein (g/day): 1.1g/kg= 120 g         Nutrition Diagnosis:   Altered nutrition-related lab values related to endocrine dysfuntion as evidenced by  (Glu greater than 200)    Nutrition Interventions:   Food and/or Nutrient Delivery: Continue Current Diet  Nutrition Education/Counseling: No recommendation at this time  Coordination of Nutrition Care: Continue to monitor while inpatient       Goals:     Goals:  (Glu less than 200)       Nutrition Monitoring and Evaluation:      Food/Nutrient Intake Outcomes: Food and Nutrient Intake  Physical Signs/Symptoms Outcomes: Biochemical Data, GI Status, Fluid Status or Edema, Skin, Weight    Discharge Planning:    Continue current diet     Jessica Elaine, [address],   Contact: [phone]

## 2023-03-06 NOTE — PROGRESS NOTES
03/06/23 0808   Encounter Summary   Encounter Overview/Reason  Volunteer Encounter   Service Provided For: Patient   Referral/Consult From: Nhi   Last Encounter  03/05/23   Complexity of Encounter Low   Begin Time 1000   End Time  1015   Total Time Calculated 15 min   Rituals, Rites and Sacraments   Type Nondenominational Communion

## 2023-03-07 LAB
ANION GAP SERPL CALCULATED.3IONS-SCNC: 11 MMOL/L (ref 9–17)
BUN SERPL-MCNC: 69 MG/DL (ref 8–23)
CALCIUM SERPL-MCNC: 8.8 MG/DL (ref 8.6–10.4)
CHLORIDE SERPL-SCNC: 106 MMOL/L (ref 98–107)
CO2 SERPL-SCNC: 26 MMOL/L (ref 20–31)
CREAT SERPL-MCNC: 2.62 MG/DL (ref 0.5–0.9)
GFR SERPL CREATININE-BSD FRML MDRD: 20 ML/MIN/1.73M2
GLUCOSE BLD-MCNC: 197 MG/DL (ref 65–105)
GLUCOSE BLD-MCNC: 273 MG/DL (ref 65–105)
GLUCOSE BLD-MCNC: 332 MG/DL (ref 65–105)
GLUCOSE BLD-MCNC: 343 MG/DL (ref 65–105)
GLUCOSE SERPL-MCNC: 189 MG/DL (ref 70–99)
POTASSIUM SERPL-SCNC: 4 MMOL/L (ref 3.7–5.3)
SODIUM SERPL-SCNC: 143 MMOL/L (ref 135–144)

## 2023-03-07 PROCEDURE — 6360000002 HC RX W HCPCS: Performed by: STUDENT IN AN ORGANIZED HEALTH CARE EDUCATION/TRAINING PROGRAM

## 2023-03-07 PROCEDURE — 6370000000 HC RX 637 (ALT 250 FOR IP): Performed by: INTERNAL MEDICINE

## 2023-03-07 PROCEDURE — 97110 THERAPEUTIC EXERCISES: CPT

## 2023-03-07 PROCEDURE — 36415 COLL VENOUS BLD VENIPUNCTURE: CPT

## 2023-03-07 PROCEDURE — 6370000000 HC RX 637 (ALT 250 FOR IP): Performed by: NURSE PRACTITIONER

## 2023-03-07 PROCEDURE — 2060000000 HC ICU INTERMEDIATE R&B

## 2023-03-07 PROCEDURE — 6370000000 HC RX 637 (ALT 250 FOR IP): Performed by: STUDENT IN AN ORGANIZED HEALTH CARE EDUCATION/TRAINING PROGRAM

## 2023-03-07 PROCEDURE — 80048 BASIC METABOLIC PNL TOTAL CA: CPT

## 2023-03-07 PROCEDURE — 97530 THERAPEUTIC ACTIVITIES: CPT

## 2023-03-07 PROCEDURE — 99231 SBSQ HOSP IP/OBS SF/LOW 25: CPT | Performed by: STUDENT IN AN ORGANIZED HEALTH CARE EDUCATION/TRAINING PROGRAM

## 2023-03-07 RX ORDER — OXYMETAZOLINE HYDROCHLORIDE 0.05 G/100ML
2 SPRAY NASAL 2 TIMES DAILY
Status: DISCONTINUED | OUTPATIENT
Start: 2023-03-07 | End: 2023-03-08 | Stop reason: HOSPADM

## 2023-03-07 RX ADMIN — INSULIN LISPRO 8 UNITS: 100 INJECTION, SOLUTION INTRAVENOUS; SUBCUTANEOUS at 11:41

## 2023-03-07 RX ADMIN — OXYMETAZOLINE HCL 2 SPRAY: 0.05 SPRAY NASAL at 15:02

## 2023-03-07 RX ADMIN — BUMETANIDE 3 MG: 1 TABLET ORAL at 09:35

## 2023-03-07 RX ADMIN — HEPARIN SODIUM 5000 UNITS: 5000 INJECTION INTRAVENOUS; SUBCUTANEOUS at 21:14

## 2023-03-07 RX ADMIN — INSULIN GLARGINE 57 UNITS: 100 INJECTION, SOLUTION SUBCUTANEOUS at 09:32

## 2023-03-07 RX ADMIN — TICAGRELOR 90 MG: 90 TABLET ORAL at 09:34

## 2023-03-07 RX ADMIN — INSULIN GLARGINE 57 UNITS: 100 INJECTION, SOLUTION SUBCUTANEOUS at 21:11

## 2023-03-07 RX ADMIN — DOCUSATE SODIUM 100 MG: 100 CAPSULE, LIQUID FILLED ORAL at 21:13

## 2023-03-07 RX ADMIN — BUMETANIDE 3 MG: 1 TABLET ORAL at 17:13

## 2023-03-07 RX ADMIN — TAMSULOSIN HYDROCHLORIDE 0.4 MG: 0.4 CAPSULE ORAL at 09:34

## 2023-03-07 RX ADMIN — TICAGRELOR 90 MG: 90 TABLET ORAL at 21:13

## 2023-03-07 RX ADMIN — GUAIFENESIN 600 MG: 600 TABLET, EXTENDED RELEASE ORAL at 21:12

## 2023-03-07 RX ADMIN — BUSPIRONE HYDROCHLORIDE 7.5 MG: 5 TABLET ORAL at 21:13

## 2023-03-07 RX ADMIN — BUSPIRONE HYDROCHLORIDE 7.5 MG: 5 TABLET ORAL at 15:00

## 2023-03-07 RX ADMIN — PANTOPRAZOLE SODIUM 40 MG: 40 TABLET, DELAYED RELEASE ORAL at 05:52

## 2023-03-07 RX ADMIN — Medication 60 MG: at 09:34

## 2023-03-07 RX ADMIN — ATORVASTATIN CALCIUM 80 MG: 80 TABLET, FILM COATED ORAL at 21:12

## 2023-03-07 RX ADMIN — Medication 60 MG: at 21:13

## 2023-03-07 RX ADMIN — ACETAMINOPHEN 650 MG: 325 TABLET ORAL at 21:13

## 2023-03-07 RX ADMIN — INSULIN LISPRO 12 UNITS: 100 INJECTION, SOLUTION INTRAVENOUS; SUBCUTANEOUS at 21:11

## 2023-03-07 RX ADMIN — GABAPENTIN 300 MG: 300 CAPSULE ORAL at 09:35

## 2023-03-07 RX ADMIN — SALINE NASAL SPRAY 1 SPRAY: 1.5 SOLUTION NASAL at 09:36

## 2023-03-07 RX ADMIN — ALLOPURINOL 100 MG: 100 TABLET ORAL at 09:35

## 2023-03-07 RX ADMIN — OXYMETAZOLINE HCL 2 SPRAY: 0.05 SPRAY NASAL at 21:27

## 2023-03-07 RX ADMIN — ASPIRIN 81 MG: 81 TABLET, CHEWABLE ORAL at 09:35

## 2023-03-07 RX ADMIN — GUAIFENESIN 600 MG: 600 TABLET, EXTENDED RELEASE ORAL at 09:35

## 2023-03-07 RX ADMIN — CARVEDILOL 3.12 MG: 3.12 TABLET, FILM COATED ORAL at 21:12

## 2023-03-07 RX ADMIN — BUSPIRONE HYDROCHLORIDE 7.5 MG: 5 TABLET ORAL at 09:35

## 2023-03-07 RX ADMIN — SALINE NASAL SPRAY 1 SPRAY: 1.5 SOLUTION NASAL at 00:57

## 2023-03-07 RX ADMIN — POLYETHYLENE GLYCOL 3350 17 G: 17 POWDER, FOR SOLUTION ORAL at 09:33

## 2023-03-07 RX ADMIN — HEPARIN SODIUM 5000 UNITS: 5000 INJECTION INTRAVENOUS; SUBCUTANEOUS at 15:01

## 2023-03-07 RX ADMIN — DOCUSATE SODIUM 100 MG: 100 CAPSULE, LIQUID FILLED ORAL at 09:35

## 2023-03-07 RX ADMIN — HEPARIN SODIUM 5000 UNITS: 5000 INJECTION INTRAVENOUS; SUBCUTANEOUS at 05:52

## 2023-03-07 RX ADMIN — Medication 3 MG: at 21:12

## 2023-03-07 ASSESSMENT — ENCOUNTER SYMPTOMS
CONSTIPATION: 0
ABDOMINAL PAIN: 0
VOMITING: 0
WHEEZING: 0
NAUSEA: 0

## 2023-03-07 NOTE — PROGRESS NOTES
Jason Bhatt MD/Carter Goins MD/ Diann Duarte MD/Jordan Damon MD  [facility] AFSANEH VAZQUEZ-BC, NP-C      Julia SHELBY NP-C     Luis Villarreal APRN NP-C                                           Pulmonary Progress Note    Patient - Nancy Roman   Age - 57 y.o.   - 1960  MRN - [id number]  Acct # - [de-identified]  Date of Admission - 2023  9:54 AM    Consulting Service/Physician:       Primary Care Physician: AFSANEH Catsillo - CNP    SUBJECTIVE:     Chief Complaint:   Chief Complaint   Patient presents with    Shortness of Breath     Subjective:    She continues to feel better, she is on 3.5L of oxygen, her baseline is 3L, likely can be turned down to 3L. She denies any orthopnea or significant shortness of breath    She had hemodialysis yesterday with 2L removed, she tells me. VITALS  BP (!) 117/35   Pulse 58   Temp 98.2 °F (36.8 °C)   Resp 18   Ht 5' 5\" (1.651 m)   Wt 234 lb 9.1 oz (106.4 kg)   SpO2 99%   BMI 39.03 kg/m²   Wt Readings from Last 3 Encounters:   23 234 lb 9.1 oz (106.4 kg)   23 248 lb (112.5 kg)   23 248 lb 2.6 oz (112.6 kg)     I/O (24 Hours)    Intake/Output Summary (Last 24 hours) at 3/7/2023 0959  Last data filed at 3/7/2023 0554  Gross per 24 hour   Intake 1670 ml   Output 4150 ml   Net -2480 ml     Ventilator:      Exam:   Physical Exam   Constitutional:  Oriented to person, place, and time. Appears well-developed and well-nourished. Lying in bed in no distress, looks comfortable  HENT: Unremarkable, nasal cannula in place  Head: Normocephalic and atraumatic. Eyes: EOM are normal. Pupils are equal, round, and reactive to light. Neck: Neck supple. Cardiovascular:  Regular rate and rhythm. Normal heart tones. No JVD. Pulmonary/Chest: Effort normal and breath sounds diminished, no rales noted today, respirations easy and nonlabored on 3.5L  Abdominal: Soft.  Bowel sounds are normal. There is no tenderness. Musculoskeletal: Normal range of motion. Some generalized weakness  Neurological:patient is alert and oriented to person, place, and time. Skin: Skin is warm and dry.    Extremities: No edema   Infusions:      sodium chloride      dextrose       Meds:     Current Facility-Administered Medications:     insulin glargine (LANTUS) injection vial 57 Units, 57 Units, SubCUTAneous, BID, Cadence Waters MD, 57 Units at 03/07/23 0932    sodium chloride (OCEAN, BABY AYR) 0.65 % nasal spray 1 spray, 1 spray, Each Nostril, Q4H PRN, Cadence Waters MD, 1 spray at 03/07/23 0936    polyethylene glycol (GLYCOLAX) packet 17 g, 17 g, Oral, Daily, Cadence Waters MD, 17 g at 03/07/23 0933    docusate sodium (COLACE) capsule 100 mg, 100 mg, Oral, BID, Cadence Waters MD, 100 mg at 03/07/23 0935    busPIRone (BUSPAR) tablet 7.5 mg, 7.5 mg, Oral, TID, Cadence Waters MD, 7.5 mg at 03/07/23 0935    carvedilol (COREG) tablet 3.125 mg, 3.125 mg, Oral, BID, Edward Ya MD, 3.125 mg at 03/06/23 2143    anticoagulant sodium citrate 4 % injection 1.6 mL, 1.6 mL, IntraCATHeter, PRN, Cristian Wilson MD, 1.6 mL at 03/06/23 1750    anticoagulant sodium citrate 4 % injection 1.6 mL, 1.6 mL, IntraCATHeter, PRN, Cristian Wilson MD, 1.6 mL at 03/06/23 1750    insulin lispro (HUMALOG) injection vial 0-16 Units, 0-16 Units, SubCUTAneous, 4x Daily AC & HS, Cadence Waters MD, 12 Units at 03/06/23 2143    albuterol (PROVENTIL) nebulizer solution 2.5 mg, 2.5 mg, Nebulization, Q4H PRN, Mir Marin MD    melatonin tablet 3 mg, 3 mg, Oral, Nightly PRN, Cadence Waters MD, 3 mg at 03/06/23 2215    guaiFENesin [facility]) extended release tablet 600 mg, 600 mg, Oral, BID, Cadence Waters MD, 600 mg at 03/07/23 0935    benzonatate (TESSALON) capsule 100 mg, 100 mg, Oral, TID PRN, Cadence Waters MD    bumetanide (BUMEX) tablet 3 mg, 3 mg, Oral, BID, Katelynn Push, APRN - CNP, 3 mg at 03/07/23 0935    dextromethorphan (DELSYM) 30 MG/5ML extended release liquid 60 mg, 60 mg, Oral, 2 times per day, Osvaldo Garsia, AFSANEH - CNP, 60 mg at 03/07/23 0934    sodium chloride flush 0.9 % injection 5-40 mL, 5-40 mL, IntraVENous, 2 times per day, Elisa Bowen MD, 10 mL at 03/06/23 2139    0.9 % sodium chloride infusion, , IntraVENous, PRN, Elisa Bowen MD    magnesium sulfate 2000 mg in water 50 mL IVPB, 2,000 mg, IntraVENous, PRN, Elisa Bowen MD    ondansetron (ZOFRAN-ODT) disintegrating tablet 4 mg, 4 mg, Oral, Q8H PRN, 4 mg at 02/28/23 1626 **OR** ondansetron (ZOFRAN) injection 4 mg, 4 mg, IntraVENous, Q6H PRN, Elisa Bowen MD    acetaminophen (TYLENOL) tablet 650 mg, 650 mg, Oral, Q6H PRN, 650 mg at 03/05/23 2027 **OR** acetaminophen (TYLENOL) suppository 650 mg, 650 mg, Rectal, Q6H PRN, Elisa Bowen MD    heparin (porcine) injection 5,000 Units, 5,000 Units, SubCUTAneous, 3 times per day, Elisa Bowen MD, 5,000 Units at 03/07/23 0552    allopurinol (ZYLOPRIM) tablet 100 mg, 100 mg, Oral, Daily, Elisa Bowen MD, 100 mg at 03/07/23 0935    aspirin chewable tablet 81 mg, 81 mg, Oral, Daily, Elisa Bowen MD, 81 mg at 03/07/23 0935    atorvastatin (LIPITOR) tablet 80 mg, 80 mg, Oral, Nightly, Elisa Bowen MD, 80 mg at 03/06/23 2139    gabapentin (NEURONTIN) capsule 300 mg, 300 mg, Oral, Daily, Elisa Bowen MD, 300 mg at 03/07/23 0935    glucose chewable tablet 16 g, 4 tablet, Oral, PRN, Elisa Bowen MD    dextrose bolus 10% 125 mL, 125 mL, IntraVENous, PRN **OR** dextrose bolus 10% 250 mL, 250 mL, IntraVENous, PRN, Elisa Bowen MD    glucagon (rDNA) injection 1 mg, 1 mg, SubCUTAneous, PRN, Elisa Bowen MD    dextrose 10 % infusion, , IntraVENous, Continuous PRN, Elisa Bowen MD    pantoprazole (PROTONIX) tablet 40 mg, 40 mg, Oral, QAM AC, Elisa Bowen MD, 40 mg at 03/07/23 0552    tamsulosin (FLOMAX) capsule 0.4 mg, 0.4 mg, Oral, Daily, Sasha CHAVES Violeta Bob MD, 0.4 mg at 03/07/23 0934    ticagrelor (BRILINTA) tablet 90 mg, 90 mg, Oral, BID, Karen Du MD, 90 mg at 03/07/23 0934    albuterol (PROVENTIL) nebulizer solution 2.5 mg, 2.5 mg, Nebulization, Q4H PRN, Bailey Domínguez MD, 2.5 mg at 02/28/23 0055    Lab Results:     Lab Results   Component Value Date    WBC 9.5 03/06/2023    HGB 11.4 (L) 03/06/2023    HCT 35.2 (L) 03/06/2023    MCV 97.0 03/06/2023     (L) 03/06/2023     Lab Results   Component Value Date    CALCIUM 8.8 03/07/2023     03/07/2023    K 4.0 03/07/2023    CO2 26 03/07/2023     03/07/2023    BUN 69 (H) 03/07/2023    CREATININE 2.62 (H) 03/07/2023     Lab Results   Component Value Date    INR 1.0 03/03/2023    PROTIME 13.3 03/03/2023       Radiology:   CXR ordered for today    3/6/23                                                       3/3/23  Less vasc congestion, still some to LLL, improved aeration  ASSESSMENT:       Acute/chronic diast heart failure with fluid volume overload- improved  Bilat pleural effusions R>L sec to above, improved  Acute on chronic hypoxic resp failure on 3.5L, baseline is 3L  CKD Stage IV  S/p tunnel catheter placement 3/3/23  ERICK  Restrictive pattern on PFTs  Hx of DVT  Chronic Lymphedema  Lifelone nonsmoke  Full code  PLAN:   Wean o2 to keep pulse ox >88%  Diurese/fluid removal per nephrology  On Brillenta, no plans for thoro, has improved with fluid removal  PT/OT  No objection to discharge to snf once arrangements are made  We will continue to follow along while inpatient        Electronically signed by AFSANEH Patel - CNP on 03/07/23     This progress note was completed using a voice transcription system. Every effort was made to ensure accuracy. However, inadvertent computerized transcription errors may be present.     SACHA SHELBY AGACNP-BC, NP-C  [facility] Pulmonary, Critical Care & Sleep

## 2023-03-07 NOTE — PROGRESS NOTES
[address]    INPATIENT OCCUPATIONAL THERAPY  PROGRESS NOTE  Date: 3/7/2023  Patient Name: Edwin Singh       Room: 9481/9289-85  MRN: [id number]    : 1960  (57 y.o.)  Gender: female   Referring Practitioner: Leoncio Montoya MD  Diagnosis: Hypoxia    Restrictions/Precautions  Restrictions/Precautions  Restrictions/Precautions: Fall Risk;General Precautions  Required Braces or Orthoses?: Yes (lymphedema)     Subjective  Subjective  Pain: Denies pain  Comments: RN Vijaya Strange treatment, pt pleasant and cooperative to particiapate. Objective  Orientation  Overall Orientation Status: Within Functional Limits  Cognition  Overall Cognitive Status: WFL    Activities of Daily Living  ADL  Feeding: Setup  Feeding Skilled Clinical Factors: per pt report. Grooming: Setup  UE Bathing: Setup  UE Bathing Skilled Clinical Factors: seated on shower chair with back. LE Bathing: Maximum assistance  LE Bathing Skilled Clinical Factors: Assist with BLEs, back of upper thighs and buttocks. Pt completed shower this date with decreased space for manuvering, no long handled sponge present at this time however would benefit from in future. UE Dressing: Minimal assistance  LE Dressing: Moderate assistance  LE Dressing Skilled Clinical Factors: max A with donning B TEDs  Toileting: Dependent/Total  Toileting Skilled Clinical Factors: External catheter  Additional Comments: ADL scores based on clinical reasoning and skilled obervation and pt report this date. Balance  Balance  Sitting Balance: Supervision (seated EOB 25+ min for seated exercise.)  Standing Balance: Stand by assistance  Standing Balance  Time: <10 secs  Activity: stand step towards HOB  Comment: no LOB without device    Transfers/Mobility  Bed mobility  Supine to Sit: Supervision  Sit to Supine: Supervision  Bed Mobility Comments: no bed rail.  HOB elevated  Transfers  Sit to stand: Stand by assistance  Stand to sit: Stand by assistance  Transfer Comments: good hand placement noted. OT Exercises  Exercise Treatment: Pt participated in UB ex's while seated EOB to address overall strength and endurance for functional care tasks. Pt completes with 2# weight 2x10 reps in RUE, 3# 2x15 reps in LUE. Pt completes in all available planes with rest breaks, SPO2 WNL throughout.       Patient Education  Patient Education  Education Given To: Patient  Education Provided: Role of Therapy, Plan of Care, Energy Conservation  Education Method: Verbal  Barriers to Learning: None  Education Outcome: Verbalized understanding, Continued education needed, Demonstrated understanding      Goals  Short Term Goals  Time Frame for Short Term Goals: By discharge  Short Term Goal 1: Pt will complete bed mobility to sitting EOB with Mod I to sitting EOB for 10+ minutes unsupported during self care tasks while maintaining SpO2 above 90%  Short Term Goal 2: Pt will complete lower body bathing/dressing with min A and Good safety with use of AE as needed  Short Term Goal 3: Pt will progress to functional transfers/mobility during self care tasks with SBA and Good safety with use of least restrictive device while maintaining Spo2 above 90%  Short Term Goal 4: Pt will verbalize/demonstrate Good understanding of energy conservation/fall prevention strategies to increase safety and independence with self care tasks  Short Term Goal 5: Pt will participate in 15+ minutes of therapeutic exercises/functional activities to increase safety and independence with self care and mobility  Occupational Therapy Plan  Times Per Week: 4-6  Current Treatment Recommendations: Self-Care / ADL, Strengthening, Balance training, Functional mobility training, Endurance training, Pain management, Safety education & training, Patient/Caregiver education & training, Equipment evaluation, education, & procurement, Home management training      Assessment  Activity Tolerance  Activity Tolerance: Patient Tolerated treatment well  Assessment  Performance deficits / Impairments: Decreased ADL status, Decreased functional mobility , Decreased ROM, Decreased strength, Decreased safe awareness, Decreased endurance, Decreased balance, Decreased high-level IADLs, Decreased fine motor control, Decreased coordination  Treatment Diagnosis: Impaired self care status  Prognosis: Fair  Decision Making: Medium Complexity  Discharge Recommendations: Patient would benefit from continued therapy after discharge  OT Equipment Recommendations  Other: TBD  Safety Devices  Type of Devices:  All fall risk precautions in place, Left in bed, Nurse notified, Bed alarm in place, Call light within reach      AM[facility] Daily Activities Inpatient  AM-[facility] Daily Activity - Inpatient   How much help is needed for putting on and taking off regular lower body clothing?: A Lot  How much help is needed for bathing (which includes washing, rinsing, drying)?: A Lot  How much help is needed for toileting (which includes using toilet, bedpan, or urinal)?: Total  How much help is needed for putting on and taking off regular upper body clothing?: A Little  How much help is needed for taking care of personal grooming?: A Little  How much help for eating meals?: A Little  [facility] Inpatient Daily Activity Raw Score: 14  AM-[facility] Inpatient ADL T-Scale Score : 33.39  ADL Inpatient CMS 0-100% Score: 59.67  ADL Inpatient CMS G-Code Modifier : CK    OT Minutes  OT Individual Minutes  Time In: 1019  Time Out: Morris 178  Minutes: 38      Electronically signed by DAYANA Elkins on 3/7/2023 at 2:39 PM

## 2023-03-07 NOTE — CARE COORDINATION
Submitted referral to [facility] Admissions for [facility] for Dr. Earnest Aguilar per patients/sisters request.    Electronically signed by AZ Chaudhry on 3/7/2023 at 8:10 AM

## 2023-03-07 NOTE — CARE COORDINATION
Transportation arranged for patient to go to [address] [place] [address] at 5:00pm 3/8 via [facility]. Facility informed. Bedside nurse informed. Informed patient and sister was on the phone as well.     Number for Report: [phone] ask for Km Poster    Electronically signed by AZ Bhandari on 3/7/2023 at 3:30 PM

## 2023-03-07 NOTE — PROGRESS NOTES
Department of Internal Medicine  Nephrology Tono Lara MD  Progress Note    Reason for consultation: Management of chronic kidney disease stage IV. Consulting physician: Kishan Bianchi MD.    Interval history:    Patient seen and examined today bedside. Doing well. No concerns from nephrology stand point at this time. Last HD yesterday 03/07/2023. 2L removed. Urine output 1650 mL. External urinary catheter in place. On bumex 3 mg oral bid. History of present illness: This is a 57 y.o. female with a significant past medical history of Obesity, type 2 diabetes mellitus with diabetic nephropathy, coronary artery disease [s/p CABG x3], systemic hypertension, heart failure with preserved ejection fraction [HFrEF] and chronic kidney disease stage IV secondary to diabetic and hypertensive nephropathy [was on hemodialysis for several months until discontinuation in August 2022 due to some recovery of renal function], who was sent in from the [address] [place] [address] for further evaluation of hypoxia. She stated that she has had progressively worsening shortness of breath and cough over the past 3 days prior to presentation and had diarrhea for 2 days. She also complains of general body aches, fatigue and dysuria. She is on chronic oxygen at the facility usually at 3 L/min flow. Oxygen saturation was in the 80% range even with increasing O2 flow to 6 L/min and hence presentation to the hospital.  Vital signs were stable at presentation with blood pressure 130/68 mmHg. Chest x-ray at presentation showed mild pulmonary vascular congestion with increased interstitial opacities at the lung bases. Laboratory studies revealed BUN/creatinine 50/2.39 mg/dL and hence nephrology consultation.     Scheduled Meds:   insulin glargine  57 Units SubCUTAneous BID    polyethylene glycol  17 g Oral Daily    docusate sodium  100 mg Oral BID    busPIRone  7.5 mg Oral TID    carvedilol  3.125 mg Oral BID    insulin lispro  0-16 Units SubCUTAneous 4x Daily AC & HS    guaiFENesin  600 mg Oral BID    bumetanide  3 mg Oral BID    dextromethorphan  60 mg Oral 2 times per day    sodium chloride flush  5-40 mL IntraVENous 2 times per day    heparin (porcine)  5,000 Units SubCUTAneous 3 times per day    allopurinol  100 mg Oral Daily    aspirin  81 mg Oral Daily    atorvastatin  80 mg Oral Nightly    gabapentin  300 mg Oral Daily    pantoprazole  40 mg Oral QAM AC    tamsulosin  0.4 mg Oral Daily    ticagrelor  90 mg Oral BID     Continuous Infusions:   sodium chloride      dextrose       Physical Exam:    VITALS:  BP (!) 117/35   Pulse 58   Temp 98.2 °F (36.8 °C)   Resp 18   Ht 5' 5\" (1.651 m)   Wt 234 lb 9.1 oz (106.4 kg)   SpO2 99%   BMI 39.03 kg/m²   TEMPERATURE:  Current - Temp: 98.2 °F (36.8 °C); Max - Temp  Av °F (36.7 °C)  Min: 97.6 °F (36.4 °C)  Max: 98.2 °F (36.8 °C)  RESPIRATIONS RANGE: Resp  Av.2  Min: 17  Max: 20  PULSE RANGE: Pulse  Av.9  Min: 52  Max: 69  BLOOD PRESSURE RANGE:  Systolic (52IGO), QAB:864 , Min:107 , BPU:176 ; Diastolic (23WRK), SGQ:32, Min:35, Max:67  PULSE OXIMETRY RANGE: SpO2  Av %  Min: 95 %  Max: 99 %  24HR INTAKE/OUTPUT:    Intake/Output Summary (Last 24 hours) at 3/7/2023 0757  Last data filed at 3/7/2023 0554  Gross per 24 hour   Intake 1910 ml   Output 4150 ml   Net -2240 ml     Constitutional: alert, appears stated age, and cooperative    Skin: Skin color, texture, turgor normal. No rashes or lesions    Head: Normocephalic, without obvious abnormality, atraumatic     Cardiovascular/Edema: regular rate and rhythm, S1, S2 normal, no murmur, click, rub or gallop    Respiratory: Diminished but significantly improved breath sounds with bilateral basal rales    Abdomen: soft, non-tender; bowel sounds normal; no masses,  no organomegaly    Back: symmetric, no curvature. ROM normal. No CVA tenderness. Extremities: Pitting edema more on left foot.  Edema significantly improved from before. Left arm AV fistula with good thrill and bruit. Neuro:  Grossly normal    CBC:   Recent Labs     03/06/23  0925   WBC 9.5   HGB 11.4*   *     BMP:    Recent Labs     03/05/23  1326 03/06/23  0925 03/07/23  0531    141 143   K 4.1 4.3 4.0    105 106   CO2 25 24 26   BUN 66* 69* 69*   CREATININE 2.75* 2.65* 2.62*   GLUCOSE 230* 307* 189*     Lab Results   Component Value Date/Time    NITRU NEGATIVE 02/27/2023 03:47 PM    COLORU Yellow 02/27/2023 03:47 PM    PHUR 6.5 02/27/2023 03:47 PM    WBCUA 0 TO 2 02/27/2023 03:47 PM    RBCUA 0 TO 2 02/27/2023 03:47 PM    MUCUS 1+ 02/07/2023 03:00 PM    TRICHOMONAS NOT REPORTED 11/14/2021 02:05 AM    YEAST FEW 02/07/2023 03:00 PM    BACTERIA None 02/27/2023 03:47 PM    SPECGRAV 1.011 02/27/2023 03:47 PM    LEUKOCYTESUR NEGATIVE 02/27/2023 03:47 PM    UROBILINOGEN Normal 02/27/2023 03:47 PM    BILIRUBINUR NEGATIVE 02/27/2023 03:47 PM    GLUCOSEU NEGATIVE 02/27/2023 03:47 PM    KETUA NEGATIVE 02/27/2023 03:47 PM    AMORPHOUS 1+ 02/25/2023 03:15 AM     Urine Sodium:     Lab Results   Component Value Date/Time    JASON 47 11/14/2021 02:04 AM     Urine Chloride:    Lab Results   Component Value Date/Time    CLUR 26 11/14/2021 02:04 AM     Urine Protein:     Lab Results   Component Value Date/Time    TPU 20 11/12/2021 10:30 AM     Urine Creatinine:     Lab Results   Component Value Date/Time    LABCREA 65.4 01/26/2023 01:10 PM     IMPRESSION/RECOMMENDATIONS:      1. Chronic kidney disease stage IV [baseline serum creatinine 2 to 3 mg/dL]  in this patient is consistent with diabetic and hypertensive nephropathy as well as cardiorenal syndrome. She has pulmonary edema, worsening renal function while on high dose diuretics and has indications for  initiation of acute dialysis/Ultrafiltration. Status post tunnel catheter placed on 3/3/2023 with first dialysis/UF on 3/3/2023. Left arm AVF unsuccessfully accessed due to infiltration.   Plan: Continue Bumex 3 mg p.o. twice daily. Monitor urine output closely. Basic metabolic profile daily. Plan for Intermittent ultrafiltration with HD Monday Wednesday Friday schedule. Ultrafiltration yesterday 2 kg fluid removal    2. Shortness of breath - multifactorial etiology including secondary to decompensated diastolic heart failure. Daily weights. 1500 mls fluid restriction. 2 g sodium per day. 3.  Systemic hypertension - blood pressure control is adequate. 4.  Borderline hyperkalemia -stable    5. Right pleural effusion and pulmonary vascular congestion on x-ray 3/3/2023-improving       Fistulogram pending. Patient will require outpatient dialysis given risk for uremic complications and recurrent fluid overload   working on an outpatient chair. Patient's preference [facility]      Pierce Fowler MD  [place] PGY-2  Off-service nephro resident  3/7/2023 7:59 AM          Nephrology attending. Pt seen and examined today. I have reviewed in detail  with the resident  the pertinent findings and physical exam. Furthermore, I have reviewed the elements of all parts of the encounter with the resident. I agree with assessment, diagnosis, management and plan.     Jhonny Lundy M.D, LESIA  Nephrologist

## 2023-03-07 NOTE — PROGRESS NOTES
[facility]   Progress Note                   Date:   3/7/2023  Patient name: Lisa Huber  Date of admission:  2/27/2023  9:54 AM  MRN:   [id number]  YOB: 1960  PCP: AFSANEH Mathew CNP    Reason for Admission:     Subjective:       Clinical Changes / Abnormalities:  Pt seen and examined alone in room lying quietly in bed. No acute CV issues/concerns overnight. Labs, vitals, & tele reviewed. Had HD yesterday. Denies any CP or palpitations. Discharge planning in progress. Medications:   Scheduled Meds:   insulin glargine  57 Units SubCUTAneous BID    polyethylene glycol  17 g Oral Daily    docusate sodium  100 mg Oral BID    busPIRone  7.5 mg Oral TID    carvedilol  3.125 mg Oral BID    insulin lispro  0-16 Units SubCUTAneous 4x Daily AC & HS    guaiFENesin  600 mg Oral BID    bumetanide  3 mg Oral BID    dextromethorphan  60 mg Oral 2 times per day    sodium chloride flush  5-40 mL IntraVENous 2 times per day    heparin (porcine)  5,000 Units SubCUTAneous 3 times per day    allopurinol  100 mg Oral Daily    aspirin  81 mg Oral Daily    atorvastatin  80 mg Oral Nightly    gabapentin  300 mg Oral Daily    pantoprazole  40 mg Oral QAM AC    tamsulosin  0.4 mg Oral Daily    ticagrelor  90 mg Oral BID     Continuous Infusions:   sodium chloride      dextrose       CBC:   Recent Labs     03/06/23  0925   WBC 9.5   HGB 11.4*   *       BMP:    Recent Labs     03/05/23  1326 03/06/23  0925 03/07/23  0531    141 143   K 4.1 4.3 4.0    105 106   CO2 25 24 26   BUN 66* 69* 69*   CREATININE 2.75* 2.65* 2.62*   GLUCOSE 230* 307* 189*       Hepatic: No results for input(s): AST, ALT, ALB, BILITOT, ALKPHOS in the last 72 hours. Troponin: No results for input(s): TROPONINI in the last 72 hours. BNP: No results for input(s): BNP in the last 72 hours. Lipids: No results for input(s): CHOL, HDL in the last 72 hours.     Invalid input(s): LDLCALCU  INR: No results for input(s): INR in the last 72 hours. Objective:   Vitals: BP (!) 117/35   Pulse 58   Temp 98.2 °F (36.8 °C)   Resp 18   Ht 5' 5\" (1.651 m)   Wt 234 lb 9.1 oz (106.4 kg)   SpO2 99%   BMI 39.03 kg/m²   General appearance: alert and cooperative with exam  HEENT: Head: Normocephalic, no lesions, without obvious abnormality. Neck: no adenopathy, no carotid bruit, no JVD, supple, symmetrical, trachea midline and thyroid not enlarged, symmetric, no tenderness/mass/nodules  Lungs: Course and diminished throughout, no rales, no distress on NC. Had nose bleed earlier today  Heart: regular rate and rhythm, S1, S2 normal, no murmur, click, rub or gallop  Abdomen: soft, non-tender; bowel sounds normal; no masses,  no organomegaly, Obese  Extremities: extremities normal, atraumatic, no cyanosis with +1/ generalized edema  Neurologic: Mental status: Alert, oriented, thought content appropriate    ECHO: 1/27/23  Summary  Global left ventricular systolic function is normal.  Estimated LVEF 50-55%. Mild concentric left ventricular hypertrophy. Normal right ventricular size and function. No significant valvular regurgitation or stenosis seen. No pericardial effusion seen. LEXISCAN STRESS TEST 2/13/23        Impression       Large infarct in the lateral wall extending into the cardiac apex with some   stalin-infarct ischemia in anterolateral wall. LVEF normal.       Risk stratification: High risk patient has no prior history of evidence of   MI. Low risk if there is known prior history. CARDIAC CATHETERIZATION ( 2/14/23)     Conclusions:     Multivessel coronary artery disease. Patent LIMA-LAD, SVG-D and SVG-OM1. Severe diffuse disease post anastomosis of SVG-OM is known  and not amenable to PCI. Patent proximal RCA stent with new mid severe stenosis. RPDA has diffuse severe stenosis but small for  PCI. Successful PTCA/FADIA of mid RCA.      Findings:  Cardiac Arteries and Lesion Findings  LMCA: has 80% diffuse disease. LAD: has proximal 100% occlusion. LIMA-LAD is patent. SVG-D is patent. LCx: has proximal 100% occlusion. SVG-OM 1 is patent with severe diffuse disease post anastomosis of  SVG-OM is known and not amenable to PCI. RCA: has proximal patent stent with mid 85% stenosis. RPDA has diffuse 80% stenosis but is a small  vessel. RPL is normal.  Lesion on Mid RCA: Mid subsection. 85% stenosis 20 mm length reduced to 0%. Pre procedure  ABEL III flow was noted. Post Procedure ABEL III flow was present. Good runoff was present. The lesion  was diagnosed as High Risk (C). The lesion was discrete and eccentric. The lesion showed with  irregular contour, mild angulation and mild tortuosity. Devices used  Capital One Flex 180 cm. Number of passes: 1.   Euphora Balloon 2.5mm x 15mm. 3 inflation(s) to a max pressure of: 20 jayne.  Christiano Dyer 2.75x22. 1 inflation(s) to a max pressure of: 20 jayne.  NC Euphora Balloon 3.0mm x 20mm.  2 inflation(s) to a max pressure of: 20 jayne     Dominance: Right        Assessment / Acute Cardiac Problems:   Acute-on-chronic HFpEF; improving  Chronic hypoxic respiratory failure  CAD with hx of CABG and recent PCI with FADIA to mid-RCA on 2/14/23  Chronic troponin elevation, stable and c/w reduced renal clearance; no evidence of acute coronary syndrome  CKD Stage IV with baseline creat 2.6-3  HTN  Type II DM  ERICK  Morbid obesity with BMI > 40  Hx of DVT  Chronic lymphedema    Patient Active Problem List:     Atherosclerosis of coronary artery bypass graft of native heart without angina pectoris     Acute kidney injury superimposed on CKD ([address]     Acute on chronic diastolic heart failure (HCC)     Diabetic polyneuropathy associated with type 2 diabetes mellitus ([address]     History of coronary artery bypass graft     Iron deficiency anemia     Spinal stenosis of lumbar region with neurogenic claudication     Mixed hyperlipidemia     CKD (chronic kidney disease) stage 4, GFR 15-29 ml/min (HCC)     Type 2 diabetes 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, with long-term current use of insulin (HCC)     Obesity, Class II, BMI 35-39.9     Thyroid nodule greater than or equal to 1 cm in diameter incidentally noted on imaging study     Hypertension, essential     Chronic ischemic heart disease     Ischemic stroke of frontal lobe (HCC)     Morbid obesity with BMI of 40.0-44.9, adult (HCC)     Disequilibrium syndrome     Anxiety     Chronic midline low back pain with bilateral sciatica     COVID     Cerebral hypoperfusion     Immature arteriovenous fistula (HCC)     History of fusion of cervical spine     Depression with anxiety     Vancomycin resistant Enterococcus UTI     Dialysis disequilibrium syndrome     Acute cystitis without hematuria     VRE infection (vancomycin resistant Enterococcus)     Infection due to ESBL-producing Klebsiella pneumoniae     Class 2 severe obesity due to excess calories with serious comorbidity and body mass index (BMI) of 35.0 to 35.9 in adult [facility])     Type 2 diabetes mellitus with hyperglycemia, with long-term current use of insulin (HCC)     Right hemiparesis (HCC)     Ulcer of left foot, limited to breakdown of skin ([address]     Secondary hyperparathyroidism ([address]     Hypertensive urgency     Hypoxia     Congestive heart failure ([address]     Nephrotic range proteinuria     Acute respiratory failure with hypoxia (HCC)     Syncope and collapse     Subacute cough     Acute on chronic heart failure, unspecified heart failure type ([address]     Diarrhea of presumed infectious origin     Epistaxis      Plan of Treatment:   Stable from CV standpoint. Continue UF/ HD and Bumex 3 mg po BID per Renal Service - appreciate assistance with volume management. Continue ASA, statin, BB, & Brilinta for recent PCI as above.  Presently no anginal symptoms  Discharge planning     Electronically signed by AFSANEH Garcia CNP on 3/7/2023 at [place] [address] Cardiology  [phone] no

## 2023-03-07 NOTE — PLAN OF CARE
Problem: Discharge Planning  Goal: Discharge to home or other facility with appropriate resources  Outcome: Progressing     Problem: Safety - Adult  Goal: Free from fall injury  Outcome: Progressing     Problem: ABCDS Injury Assessment  Goal: Absence of physical injury  Outcome: Progressing     Problem: Chronic Conditions and Co-morbidities  Goal: Patient's chronic conditions and co-morbidity symptoms are monitored and maintained or improved  Outcome: Progressing     Problem: Skin/Tissue Integrity  Goal: Absence of new skin breakdown  Description: 1. Monitor for areas of redness and/or skin breakdown  2. Assess vascular access sites hourly  3. Every 4-6 hours minimum:  Change oxygen saturation probe site  4. Every 4-6 hours:  If on nasal continuous positive airway pressure, respiratory therapy assess nares and determine need for appliance change or resting period.   Outcome: Progressing     Problem: Pain  Goal: Verbalizes/displays adequate comfort level or baseline comfort level  Outcome: Progressing     Problem: Nutrition Deficit:  Goal: Optimize nutritional status  Outcome: Progressing

## 2023-03-07 NOTE — DISCHARGE SUMMARY
[address]  Family Medicine      Discharge Summary      NAME:  Carole Oglesby  :  1960  MRN:  [id number]    Admit date:  2023  Discharge date:  3/8/2023    Admitting Physician:  Kris Bourne MD    Primary Diagnosis on Admission:   Present on Admission:   Acute on chronic heart failure, unspecified heart failure type ([facility]   Type 2 diabetes mellitus with hyperglycemia, with long-term current use of insulin ([facility])   Acute respiratory failure with hypoxia ([facility])   CKD (chronic kidney disease) stage 4, GFR 15-29 ml/min ([facility])   Diarrhea of presumed infectious origin   Epistaxis   Chronic respiratory failure with hypoxia ([facility]      Secondary Diagnoses:  does not have any pertinent problems on file. Admission Condition:  poor     Discharged Condition: good      Hospital Course: The patient was admitted for the management of acute hypoxic respiratory failure. Patient has a history of heart failure, renal failure and type 2 diabetes. Patient was noted to have bilateral pleural effusions and she was treated with several days of diuretics however patient's pleural effusions and dyspnea did not improve. Patient was later agreeable to dialysis and after several rounds of dialysis with ultrafiltration patient's breathing improved significantly and chest x-ray improved as well. Patient does have fistula in the left arm however it is not functioning and we are not able to get a fistulogram during this admission. Patient will need to continue outpatient dialysis with intermittent ultrafiltration. Patient also was noted to be bradycardic, Coreg was reduced and amlodipine was stopped. Insulin was increased slightly to 57 units twice daily although this may need to be increased further outpatient. Patient was seen and assessed at bedside today. She is feeling better and coughing has resolved. She is at baseline nasal cannula. Rales at the bases are mostly resolved.   Patient will go for 1 round of dialysis today before leaving. Today the patient feels better with no further complaints. Vitals and Labs are at pts baseline. All consultants involved during this admission are agreeable to d/c. Consults:  cardiology, pulmonary/intensive care, nephrology, and rehabilitation medicine    Significant Diagnostic/theraputic interventions: Dialysis, CT      Disposition:   [facility]    Instructions to Patient:      Follow up with AFSANEH Mathew CNP in  1-2 weeks    Discharge Medications:       Medication List        START taking these medications      polyethylene glycol 17 g packet  Commonly known as: GLYCOLAX  Take 17 g by mouth daily as needed for Constipation     sodium chloride 0.65 % nasal spray  Commonly known as: OCEAN, BABY AYR  1 spray by Nasal route every 4 hours as needed for Congestion            CHANGE how you take these medications      Basaglar KwikPen 100 UNIT/ML injection pen  Generic drug: insulin glargine  Inject 57 Units into the skin 2 times daily  What changed: how much to take     bumetanide 1 MG tablet  Commonly known as: BUMEX  Take 3 tablets by mouth 2 times daily  What changed: additional instructions     carvedilol 3.125 MG tablet  Commonly known as: COREG  Take 1 tablet by mouth 2 times daily  What changed:   medication strength  how much to take     gabapentin 100 MG capsule  Commonly known as: NEURONTIN  Take 1 capsule by mouth daily for 30 days. Intended supply: 30 days  What changed: Another medication with the same name was removed. Continue taking this medication, and follow the directions you see here. pantoprazole 40 MG tablet  Commonly known as: PROTONIX  Take 1 tablet by mouth every morning (before breakfast)  What changed: See the new instructions.             CONTINUE taking these medications      acetaminophen 325 MG tablet  Commonly known as: TYLENOL     allopurinol 100 MG tablet  Commonly known as: ZYLOPRIM  Take 1 tablet by mouth daily aspirin 81 MG chewable tablet  Take 1 tablet by mouth daily     atorvastatin 80 MG tablet  Commonly known as: LIPITOR  Take 1 tablet by mouth nightly     AZO-CRANBERRY PO     blood glucose test strips  Test 3 times a day & as needed for symptoms of irregular blood glucose. Dispense sufficient amount for indicated testing frequency plus additional to accommodate PRN testing needs. busPIRone 7.5 MG tablet  Commonly known as: BUSPAR     Dexcom G6  Lily  1 each by Does not apply route 4 times daily     Dexcom G6 Sensor Misc  1 each by Does not apply route 4 times daily     docusate sodium 100 MG capsule  Commonly known as: COLACE  Take 1 capsule by mouth 2 times daily     Easy Touch Pen Needles 29G X 12MM Misc  Generic drug: Insulin Pen Needle  5 each by Does not apply route daily     FIBER-CAPS PO     HumaLOG 100 UNIT/ML injection vial  Generic drug: insulin lispro     ipratropium-albuterol 0.5-2.5 (3) MG/3ML Soln nebulizer solution  Commonly known as: DUONEB     * Lancets Misc  1 each by Does not apply route daily     * ReliOn Lancets Micro-Thin 33G Misc     linaclotide 145 MCG capsule  Commonly known as: LINZESS     melatonin 3 MG Tabs tablet     omeprazole 20 MG delayed release capsule  Commonly known as: PRILOSEC     Robitussin Cold Cough+ Chest  MG/5ML syrup  Generic drug: dextromethorphan-guaiFENesin     sodium bicarbonate 650 MG tablet  Take 2 tablets by mouth 2 times daily     tamsulosin 0.4 MG capsule  Commonly known as: FLOMAX  Take 1 capsule by mouth daily     ticagrelor 90 MG Tabs tablet  Commonly known as: BRILINTA  Take 1 tablet by mouth 2 times daily     True Metrix Go Glucose Meter w/Device Kit  1 each by Does not apply route 4 times daily           * This list has 2 medication(s) that are the same as other medications prescribed for you. Read the directions carefully, and ask your doctor or other care provider to review them with you.                 STOP taking these medications amLODIPine 5 MG tablet  Commonly known as: Khang Quinn               Where to Get Your Medications        Information about where to get these medications is not yet available    Ask your nurse or doctor about these medications  Basaglar KwikPen 100 UNIT/ML injection pen  bumetanide 1 MG tablet  carvedilol 3.125 MG tablet  pantoprazole 40 MG tablet  polyethylene glycol 17 g packet  sodium chloride 0.65 % nasal spray         Send Copies to: AFSANEH Loya - CNP      Note that over 30 minutes was spent in preparing discharge papers, discussing discharge with patient and family, medication review, etc.    Signed:   Estela Fan MD   3/7/2023, 4:34 PM

## 2023-03-07 NOTE — PROGRESS NOTES
Physical Therapy  Facility/Department: [facility] PROGRESSIVE CARE  Daily Treatment Note  NAME: Taylor Cr  : 1960  MRN: [id number]    Date of Service: 3/7/2023    Discharge Recommendations:  Patient would benefit from continued therapy after discharge        Patient Diagnosis(es): The encounter diagnosis was Congestive heart failure, unspecified HF chronicity, unspecified heart failure type ([facility] [address] Assessment   Activity Tolerance: Patient tolerated treatment well;Treatment limited secondary to medical complications (SOB destating with activity)     Plan    Physcial Therapy Plan  General Plan: 5-7 times per week  Specific Instructions for Next Treatment: Bed mobility, Supine/seated Exercise; Diaphragmatic breathing exercises  Current Treatment Recommendations: Strengthening;Balance training;Functional mobility training;Transfer training; Endurance training;Gait training;Patient/Caregiver education & training; Safety education & training;Equipment evaluation, education, & procurement; Therapeutic activities     Restrictions  Restrictions/Precautions  Restrictions/Precautions: Fall Risk, General Precautions  Required Braces or Orthoses?: Yes (lymphedema)     Subjective    Subjective  Subjective: Patient in bed upon arrival and agreeable to PT treatment. SpO2 at 86% on room air upon arrival; patient was sleeping when nasal cannula came dislodged; patient donned nasal cannula and quickly recovered to 94% on 3Lt.   Pain: Denies pain  Orientation  Overall Orientation Status: Within Functional Limits  Orientation Level: Oriented X4  Cognition  Overall Cognitive Status: WFL     Objective   Vitals  O2 Device: Nasal cannula  Bed Mobility Training  Bed Mobility Training: Yes  Overall Level of Assistance: Stand-by assistance  Interventions: Safety awareness training (increased time to ccomplete)  Rolling: Stand-by assistance  Supine to Sit: Stand-by assistance (use of bed rails)  Sit to Supine: Stand-by assistance (use of bed rails)  Scooting: Stand-by assistance (seated scooting towards EOB)  Balance  Sitting: Without support  Standing: With support (RW)  Transfer Training  Transfer Training: Yes  Overall Level of Assistance: Contact-guard assistance  Interventions: Safety awareness training;Verbal cues (vcs for hand placement, transfers completed with RW)  Sit to Stand: Contact-guard assistance (use of RW)  Stand to Sit: Contact-guard assistance  Gait Training  Gait Training: No  Right Side Weight Bearing: Full  Left Side Weight Bearing: Full  Wheelchair Management  Wheelchair Management: No     PT Exercises  Resistive Exercises: bilat LE seated ther ex with lime green TB x15 reps (marches, LAQ, hamstring curls, clamshells, hip abd, int/ext hip rotation)  Circulation/Endurance Exercises: ankle pumps, quad sets, glut sets x20 reps  Pressure Relief Exercises: positional changes  Functional Mobility Circuit Training: STS x2 reps  Static Sitting Balance Exercises: dangled 15 min  Dynamic Sitting Balance Exercises: Pt tolerated sitting up at EOB x 34 mins while performing various LE strengthening ex. Static Standing Balance Exercises: static stand suppotr with RW 1 min and unsupported 25 sec  Dynamic Standing Balance Exercises: weight shifting, lateral stepping, postural ex  Breathing Techniques: diaphragmatic breathing and pursed lip breathing techniques  Disease-specific Exercises: SpO2 WFL on 3Lt fluctuating between 905<>96% throughout exercise demonstrating good breathing technique with vcs     Safety Devices  Type of Devices: All fall risk precautions in place; Left in bed;Nurse notified; Bed alarm in place;Call light within reach     AM-PAC Basic Mobility - Inpatient   How much help is needed turning from your back to your side while in a flat bed without using bedrails?: A Little  How much help is needed moving from lying on your back to sitting on the side of a flat bed without using bedrails?: A Little  How much help is needed moving to and from a bed to a chair?: A Little  How much help is needed standing up from a chair using your arms?: A Little  How much help is needed walking in hospital room?: A Little  How much help is needed climbing 3-5 steps with a railing?: Total  AM-PAC Inpatient Mobility Raw Score : 16  AM-PAC Inpatient T-Scale Score : 40.78  Mobility Inpatient CMS 0-100% Score: 54.16  Mobility Inpatient CMS G-Code Modifier : CK     Goals  Short Term Goals  Time Frame for Short Term Goals: 8 visits  Short Term Goal 1: pt to demo functional bed mobility with Jose  Short Term Goal 2: pt to tolerate 30 minutes of therapeutic activity and exercise while maintaining SpO2 of 90% or greater  Short Term Goal 3: Pt to demo good technique for Core and BLE strengthening exercises  Short Term Goal 4: PT to assess OOB transfers, gait, and stair climbing when medically appropriate    Education  Patient Education  Education Given To: Patient  Education Provided: Role of Therapy;Plan of Care  Education Provided Comments: Breathing techniques  Education Method: Verbal  Barriers to Learning: None  Education Outcome: Verbalized understanding;Demonstrated understanding    Therapy Time   Individual Concurrent Group Co-treatment   Time In 1337         Time Out 1423         Minutes [address]

## 2023-03-07 NOTE — PLAN OF CARE
Problem: Safety - Adult  Goal: Free from fall injury  3/7/2023 0315 by Aura Salinas RN  Outcome: Progressing  Note: Pt free from falls     Problem: ABCDS Injury Assessment  Goal: Absence of physical injury  3/7/2023 0315 by Aura Salinas RN  Outcome: Progressing  Note: Pt absent of any physical injury     Problem: Skin/Tissue Integrity  Goal: Absence of new skin breakdown  Description: 1. Monitor for areas of redness and/or skin breakdown  2. Assess vascular access sites hourly  3. Every 4-6 hours minimum:  Change oxygen saturation probe site  4. Every 4-6 hours:  If on nasal continuous positive airway pressure, respiratory therapy assess nares and determine need for appliance change or resting period.   3/7/2023 0315 by Aura Salinas RN  Outcome: Progressing  Note: Pt absent of any new skin breakdown

## 2023-03-07 NOTE — DIALYSIS
HEMODIALYSIS POST TREATMENT NOTE    Treatment time ordered: 3 hours    Actual treatment time: 3 hours    UltraFiltration Goal: 2 L  UltraFiltration Removed: 2 L      Pre Treatment weight: 108.4 kg  Post Treatment weight: 106.4 kg  Estimated Dry Weight:     Access used:     Central Venous Catheter:          Tunneled or Non-tunneled: tunneled           Site: r neck          Access Flow: good      Internal Access:       AV Fistula or AV Graft:          Site: geneva       Access Flow: fistulagram ordered       Sign and symptoms of infection: no       If YES:     Medications or blood products given: none    Chronic outpatient schedule: mwf    Chronic outpatient unit:     Summary of response to treatment: Patient tolerated treatment well. Net fluid removal 2 L over 3 hours.    Explain if orders NOT met, was physician notified: n/a      ACES flowsheet faxed to patient unit/ placed in patient chart: yes    Post assessment completed: yes    Report given to: Emelina AGUAYO      * Intra-treatment documented Safety Checks include the followin) Access and face visible at all times.     2) All connections and blood lines are secure with no kinks.     3) NVL alarm engaged.     4) Hemosafe device applied (if applicable).     5) No collapse of Arterial or Venous blood chambers.     6) All blood lines / pump segments in the air detectors.

## 2023-03-07 NOTE — PROGRESS NOTES
[address]  Family Medicine        Progress Note      Date:   3/7/2023  Patient name:  Jill Harrell  Date of admission:  2/27/2023  9:54 AM  MRN:   [id number]  YOB: 1960      Brief HPI    Respiratory failure secondary to CHF exacerbation, renal failure, requiring ultrafiltration    ASSESSMENT/PLAN     Hospital Problems             Last Modified POA    * (Principal) Acute respiratory failure with hypoxia ([address] 2/27/2023 Yes    Type 2 diabetes mellitus with hyperglycemia, with long-term current use of insulin ([address] 2/27/2023 Yes    Acute on chronic heart failure, unspecified heart failure type ([address] 2/27/2023 Yes    Diarrhea of presumed infectious origin 2/27/2023 Yes    Epistaxis 3/5/2023 Yes    Chronic respiratory failure with hypoxia ([address] 3/6/2023 Yes    CKD (chronic kidney disease) stage 4, GFR 15-29 ml/min ([address] 2/27/2023 Yes   1. MWF dialysis with ultrafiltration  2. Lantus 57 units twice daily with a high dose scale  3.  BB dose reduced due to bradycardia, will discontinue amlodipine  4. Continue bumex  6. Cardio, pulm, nephro on board  7. Miralax, restart Linzess  8. Chest x-ray clear, patient okay for discharge    DVT:heparin subcu  Dispo: If patient is able to get a spot for dialysis will discharge today    SUBJECTIVE:     Patient was seen and examined at bedside patient, breathing is comfortable on nasal cannula, only complaint today is fatigue. Patient is still having occasional nosebleeds due to the nasal cannula. We will start Afrin. Consultant notes, labs & imaging reviewed. Review of Systems   Constitutional:  Negative for chills, diaphoresis and fever. Respiratory:  Negative for wheezing. Dyspnea and cough have improving   Cardiovascular:  Negative for palpitations and leg swelling. Gastrointestinal:  Negative for abdominal pain, constipation, nausea and vomiting. Genitourinary:  Negative for difficulty urinating and dysuria. Neurological:  Negative for dizziness, light-headedness and headaches. OBJECTIVE:     BP (!) 117/35   Pulse 58   Temp 98.2 °F (36.8 °C)   Resp 18   Ht 5' 5\" (1.651 m)   Wt 234 lb 9.1 oz (106.4 kg)   SpO2 99%   BMI 39.03 kg/m²      Physical Exam  Vitals and nursing note reviewed. Eyes:      Extraocular Movements: Extraocular movements intact. Conjunctiva/sclera: Conjunctivae normal.   Cardiovascular:      Rate and Rhythm: Normal rate and regular rhythm. Pulses: Normal pulses. Heart sounds: Normal heart sounds. Pulmonary:      Breath sounds: No wheezing or rales. Comments: Coarse breath sounds but good air movement and no rales today  Abdominal:      General: There is no distension. Palpations: Abdomen is soft. Tenderness: There is no abdominal tenderness. There is no guarding. Musculoskeletal:      Right lower leg: No edema. Left lower leg: No edema. Comments: Edema is improving   Neurological:      General: No focal deficit present. Mental Status: She is alert and oriented to person, place, and time.         Intake/Output:    Intake/Output Summary (Last 24 hours) at 3/7/2023 1157  Last data filed at 3/7/2023 1015  Gross per 24 hour   Intake 1670 ml   Output 4650 ml   Net -2980 ml       Laboratory Testing:  CBC:   Recent Labs     03/06/23  0925   WBC 9.5   HGB 11.4*   *       BMP:    Recent Labs     03/05/23  1326 03/06/23  0925 03/07/23  0531    141 143   K 4.1 4.3 4.0    105 106   CO2 25 24 26   BUN 66* 69* 69*   CREATININE 2.75* 2.65* 2.62*   GLUCOSE 230* 307* 189*       Magnesium:   Lab Results   Component Value Date/Time    MG 2.2 02/12/2023 06:07 AM     Phosphorus:   Lab Results   Component Value Date/Time    PHOS 4.0 01/27/2023 03:09 AM     Ionized Calcium:   Lab Results   Component Value Date/Time    CAION 1.04 04/18/2022 12:27 PM      PT/INR:    Lab Results   Component Value Date/Time    PROTIME 13.3 03/03/2023 05:47 AM PROTIME 16.4 08/27/2015 12:00 AM    PROTIME 16.4 08/27/2015 12:00 AM    INR 1.0 03/03/2023 05:47 AM     PTT:    Lab Results   Component Value Date/Time    APTT 27.5 11/10/2021 03:50 PM           Cole Aguirre MD   3/7/2023 11:57 AM

## 2023-03-07 NOTE — PROGRESS NOTES
[facility]   Progress Note                   Date:   3/6/2023  Patient name: Lyndsay Archer  Date of admission:  2/27/2023  9:54 AM  MRN:   [id number]  YOB: 1960  PCP: AFSANEH Mcmahon CNP    Reason for Admission:     Subjective:       Clinical Changes / Abnormalities:  Pt seen at HD; feeling better with no SOB at rest; SpO2 97% on 3.5 L. She has had no CP or lightheadedness with net output of 6.7 L since admission. Medications:   Scheduled Meds:   insulin glargine  57 Units SubCUTAneous BID    polyethylene glycol  17 g Oral Daily    docusate sodium  100 mg Oral BID    busPIRone  7.5 mg Oral TID    carvedilol  3.125 mg Oral BID    insulin lispro  0-16 Units SubCUTAneous 4x Daily AC & HS    guaiFENesin  600 mg Oral BID    bumetanide  3 mg Oral BID    dextromethorphan  60 mg Oral 2 times per day    sodium chloride flush  5-40 mL IntraVENous 2 times per day    heparin (porcine)  5,000 Units SubCUTAneous 3 times per day    allopurinol  100 mg Oral Daily    aspirin  81 mg Oral Daily    atorvastatin  80 mg Oral Nightly    gabapentin  300 mg Oral Daily    pantoprazole  40 mg Oral QAM AC    tamsulosin  0.4 mg Oral Daily    ticagrelor  90 mg Oral BID     Continuous Infusions:   sodium chloride      dextrose       CBC:   Recent Labs     03/04/23  0552 03/06/23  0925   WBC 6.7 9.5   HGB 11.0* 11.4*    131*     BMP:    Recent Labs     03/04/23  0552 03/05/23  1326 03/06/23  0925    142 141   K 4.4 4.1 4.3    107 105   CO2 26 25 24   BUN 85* 66* 69*   CREATININE 3.50* 2.75* 2.65*   GLUCOSE 98 230* 307*     Hepatic: No results for input(s): AST, ALT, ALB, BILITOT, ALKPHOS in the last 72 hours. Troponin: No results for input(s): TROPONINI in the last 72 hours. BNP: No results for input(s): BNP in the last 72 hours. Lipids: No results for input(s): CHOL, HDL in the last 72 hours.     Invalid input(s): LDLCALCU  INR: No results for input(s): INR in the last 72 hours.    Objective:   Vitals: BP (!) 144/63   Pulse 61   Temp 97.6 °F (36.4 °C) (Oral)   Resp 18   Ht 5' 5\" (1.651 m)   Wt 238 lb 15.7 oz (108.4 kg)   SpO2 97%   BMI 39.77 kg/m²   General appearance: alert and cooperative with exam  HEENT: Head: Normocephalic, no lesions, without obvious abnormality. Neck: no adenopathy, no carotid bruit, no JVD, supple, symmetrical, trachea midline and thyroid not enlarged, symmetric, no tenderness/mass/nodules  Lungs: clear to auscultation bilaterally  Heart: regular rate and rhythm, S1, S2 normal, no murmur, click, rub or gallop  Abdomen: soft, non-tender; bowel sounds normal; no masses,  no organomegaly  Extremities: extremities normal, atraumatic, no cyanosis with 2+ LE edema  Neurologic: Mental status: Alert, oriented, thought content appropriate    ECHO: 1/27/23  Summary  Global left ventricular systolic function is normal.  Estimated LVEF 50-55%. Mild concentric left ventricular hypertrophy. Normal right ventricular size and function. No significant valvular regurgitation or stenosis seen. No pericardial effusion seen. LEXISCAN STRESS TEST 2/13/23        Impression       Large infarct in the lateral wall extending into the cardiac apex with some   stalin-infarct ischemia in anterolateral wall. LVEF normal.       Risk stratification: High risk patient has no prior history of evidence of   MI. Low risk if there is known prior history. CARDIAC CATHETERIZATION ( 2/14/23)     Conclusions:     Multivessel coronary artery disease. Patent LIMA-LAD, SVG-D and SVG-OM1. Severe diffuse disease post anastomosis of SVG-OM is known  and not amenable to PCI. Patent proximal RCA stent with new mid severe stenosis. RPDA has diffuse severe stenosis but small for  PCI. Successful PTCA/FADIA of mid RCA. Findings:  Cardiac Arteries and Lesion Findings  LMCA: has 80% diffuse disease. LAD: has proximal 100% occlusion. LIMA-LAD is patent.  SVG-D is patent. LCx: has proximal 100% occlusion. SVG-OM 1 is patent with severe diffuse disease post anastomosis of  SVG-OM is known and not amenable to PCI. RCA: has proximal patent stent with mid 85% stenosis. RPDA has diffuse 80% stenosis but is a small  vessel. RPL is normal.  Lesion on Mid RCA: Mid subsection. 85% stenosis 20 mm length reduced to 0%. Pre procedure  ABEL III flow was noted. Post Procedure ABEL III flow was present. Good runoff was present. The lesion  was diagnosed as High Risk (C). The lesion was discrete and eccentric. The lesion showed with  irregular contour, mild angulation and mild tortuosity. Devices used  Capital One Flex 180 cm. Number of passes: 1.   Euphora Balloon 2.5mm x 15mm. 3 inflation(s) to a max pressure of: 20 jayne.  Lolo Kelliher 2.75x22. 1 inflation(s) to a max pressure of: 20 jayne.  NC Euphora Balloon 3.0mm x 20mm.  2 inflation(s) to a max pressure of: 20 jayne     Dominance: Right        Assessment / Acute Cardiac Problems:   Acute-on-chronic HFpEF; improving  Chronic hypoxic respiratory failure  CAD with hx of CABG and recent PCI with FADIA to mid-RCA on 2/14/23  Chronic troponin elevation, stable and c/w reduced renal clearance; no evidence of acute coronary syndrome  CKD Stage IV with baseline creat 2.6-3  HTN  Type II DM  ERICK  Morbid obesity with BMI > 40  Hx of DVT  Chronic lymphedema    Patient Active Problem List:     Atherosclerosis of coronary artery bypass graft of native heart without angina pectoris     Acute kidney injury superimposed on CKD ([address]     Acute on chronic diastolic heart failure (HCC)     Diabetic polyneuropathy associated with type 2 diabetes mellitus ([address]     History of coronary artery bypass graft     Iron deficiency anemia     Spinal stenosis of lumbar region with neurogenic claudication     Mixed hyperlipidemia     CKD (chronic kidney disease) stage 4, GFR 15-29 ml/min ([facility])     Type 2 diabetes 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, with long-term current use of insulin (HCC)     Obesity, Class II, BMI 35-39.9     Thyroid nodule greater than or equal to 1 cm in diameter incidentally noted on imaging study     Hypertension, essential     Chronic ischemic heart disease     Ischemic stroke of frontal lobe (HCC)     Morbid obesity with BMI of 40.0-44.9, adult (HCC)     Disequilibrium syndrome     Anxiety     Chronic midline low back pain with bilateral sciatica     COVID     Cerebral hypoperfusion     Immature arteriovenous fistula (HCC)     History of fusion of cervical spine     Depression with anxiety     Vancomycin resistant Enterococcus UTI     Dialysis disequilibrium syndrome     Acute cystitis without hematuria     VRE infection (vancomycin resistant Enterococcus)     Infection due to ESBL-producing Klebsiella pneumoniae     Class 2 severe obesity due to excess calories with serious comorbidity and body mass index (BMI) of 35.0 to 35.9 in adult [facility])     Type 2 diabetes mellitus with hyperglycemia, with long-term current use of insulin (HCC)     Right hemiparesis (HCC)     Ulcer of left foot, limited to breakdown of skin ([address]     Secondary hyperparathyroidism ([address]     Hypertensive urgency     Hypoxia     Congestive heart failure ([address]     Nephrotic range proteinuria     Acute respiratory failure with hypoxia ([address]     Syncope and collapse     Subacute cough     Acute on chronic heart failure, unspecified heart failure type ([address]     Diarrhea of presumed infectious origin     Epistaxis      Plan of Treatment:   Continue UF/ HD and Bumex 3 mg po BID per Renal Service  Continue carvedilol 3.125 mg po bid  Continue ASA and Brilinta     Electronically signed by Kennedi Black MD on 3/6/2023 at [address] [place] [address] Cardiology  [phone]

## 2023-03-08 VITALS
SYSTOLIC BLOOD PRESSURE: 140 MMHG | WEIGHT: 235.23 LBS | DIASTOLIC BLOOD PRESSURE: 57 MMHG | RESPIRATION RATE: 18 BRPM | BODY MASS INDEX: 39.19 KG/M2 | HEART RATE: 50 BPM | HEIGHT: 65 IN | TEMPERATURE: 97.6 F | OXYGEN SATURATION: 97 %

## 2023-03-08 LAB
ANION GAP SERPL CALCULATED.3IONS-SCNC: 9 MMOL/L (ref 9–17)
BUN SERPL-MCNC: 71 MG/DL (ref 8–23)
CALCIUM SERPL-MCNC: 9 MG/DL (ref 8.6–10.4)
CHLORIDE SERPL-SCNC: 107 MMOL/L (ref 98–107)
CO2 SERPL-SCNC: 26 MMOL/L (ref 20–31)
CREAT SERPL-MCNC: 2.71 MG/DL (ref 0.5–0.9)
GFR SERPL CREATININE-BSD FRML MDRD: 19 ML/MIN/1.73M2
GLUCOSE BLD-MCNC: 147 MG/DL (ref 65–105)
GLUCOSE BLD-MCNC: 263 MG/DL (ref 65–105)
GLUCOSE BLD-MCNC: 310 MG/DL (ref 65–105)
GLUCOSE SERPL-MCNC: 159 MG/DL (ref 70–99)
POTASSIUM SERPL-SCNC: 3.9 MMOL/L (ref 3.7–5.3)
SODIUM SERPL-SCNC: 142 MMOL/L (ref 135–144)

## 2023-03-08 PROCEDURE — 90935 HEMODIALYSIS ONE EVALUATION: CPT

## 2023-03-08 PROCEDURE — 80048 BASIC METABOLIC PNL TOTAL CA: CPT

## 2023-03-08 PROCEDURE — 2500000003 HC RX 250 WO HCPCS: Performed by: INTERNAL MEDICINE

## 2023-03-08 PROCEDURE — 99239 HOSP IP/OBS DSCHRG MGMT >30: CPT | Performed by: STUDENT IN AN ORGANIZED HEALTH CARE EDUCATION/TRAINING PROGRAM

## 2023-03-08 PROCEDURE — 6370000000 HC RX 637 (ALT 250 FOR IP): Performed by: STUDENT IN AN ORGANIZED HEALTH CARE EDUCATION/TRAINING PROGRAM

## 2023-03-08 PROCEDURE — 6370000000 HC RX 637 (ALT 250 FOR IP): Performed by: NURSE PRACTITIONER

## 2023-03-08 PROCEDURE — 82947 ASSAY GLUCOSE BLOOD QUANT: CPT

## 2023-03-08 PROCEDURE — 36415 COLL VENOUS BLD VENIPUNCTURE: CPT

## 2023-03-08 PROCEDURE — 6360000002 HC RX W HCPCS: Performed by: STUDENT IN AN ORGANIZED HEALTH CARE EDUCATION/TRAINING PROGRAM

## 2023-03-08 RX ADMIN — ALLOPURINOL 100 MG: 100 TABLET ORAL at 08:17

## 2023-03-08 RX ADMIN — INSULIN LISPRO 8 UNITS: 100 INJECTION, SOLUTION INTRAVENOUS; SUBCUTANEOUS at 13:09

## 2023-03-08 RX ADMIN — Medication 1.6 ML: at 12:55

## 2023-03-08 RX ADMIN — BUMETANIDE 3 MG: 1 TABLET ORAL at 17:35

## 2023-03-08 RX ADMIN — POLYETHYLENE GLYCOL 3350 17 G: 17 POWDER, FOR SOLUTION ORAL at 08:15

## 2023-03-08 RX ADMIN — OXYMETAZOLINE HCL 2 SPRAY: 0.05 SPRAY NASAL at 08:17

## 2023-03-08 RX ADMIN — INSULIN GLARGINE 57 UNITS: 100 INJECTION, SOLUTION SUBCUTANEOUS at 08:19

## 2023-03-08 RX ADMIN — Medication 60 MG: at 08:15

## 2023-03-08 RX ADMIN — DOCUSATE SODIUM 100 MG: 100 CAPSULE, LIQUID FILLED ORAL at 08:17

## 2023-03-08 RX ADMIN — BUMETANIDE 3 MG: 1 TABLET ORAL at 08:17

## 2023-03-08 RX ADMIN — INSULIN LISPRO 12 UNITS: 100 INJECTION, SOLUTION INTRAVENOUS; SUBCUTANEOUS at 17:35

## 2023-03-08 RX ADMIN — GUAIFENESIN 600 MG: 600 TABLET, EXTENDED RELEASE ORAL at 08:17

## 2023-03-08 RX ADMIN — PANTOPRAZOLE SODIUM 40 MG: 40 TABLET, DELAYED RELEASE ORAL at 05:21

## 2023-03-08 RX ADMIN — HEPARIN SODIUM 5000 UNITS: 5000 INJECTION INTRAVENOUS; SUBCUTANEOUS at 05:21

## 2023-03-08 RX ADMIN — GABAPENTIN 300 MG: 300 CAPSULE ORAL at 08:17

## 2023-03-08 RX ADMIN — ASPIRIN 81 MG: 81 TABLET, CHEWABLE ORAL at 08:17

## 2023-03-08 RX ADMIN — TICAGRELOR 90 MG: 90 TABLET ORAL at 08:17

## 2023-03-08 RX ADMIN — HEPARIN SODIUM 5000 UNITS: 5000 INJECTION INTRAVENOUS; SUBCUTANEOUS at 13:11

## 2023-03-08 RX ADMIN — BUSPIRONE HYDROCHLORIDE 7.5 MG: 5 TABLET ORAL at 13:11

## 2023-03-08 RX ADMIN — BUSPIRONE HYDROCHLORIDE 7.5 MG: 5 TABLET ORAL at 08:17

## 2023-03-08 RX ADMIN — TAMSULOSIN HYDROCHLORIDE 0.4 MG: 0.4 CAPSULE ORAL at 08:17

## 2023-03-08 NOTE — PLAN OF CARE
Problem: Discharge Planning  Goal: Discharge to home or other facility with appropriate resources  3/8/2023 1700 by Bigg Multani RN  Outcome: Adequate for Discharge  3/8/2023 0440 by Liza Zarco RN  Outcome: Progressing     Problem: Safety - Adult  Goal: Free from fall injury  3/8/2023 1700 by Bigg Multani RN  Outcome: Adequate for Discharge  3/8/2023 0440 by Liza Zarco RN  Outcome: Progressing     Problem: ABCDS Injury Assessment  Goal: Absence of physical injury  3/8/2023 1700 by Bigg Multani RN  Outcome: Adequate for Discharge  3/8/2023 0440 by Liza Zarco RN  Outcome: Progressing     Problem: Chronic Conditions and Co-morbidities  Goal: Patient's chronic conditions and co-morbidity symptoms are monitored and maintained or improved  3/8/2023 1700 by Bigg Multani RN  Outcome: Adequate for Discharge  3/8/2023 0440 by Liza Zarco RN  Outcome: Progressing     Problem: Skin/Tissue Integrity  Goal: Absence of new skin breakdown  Description: 1. Monitor for areas of redness and/or skin breakdown  2. Assess vascular access sites hourly  3. Every 4-6 hours minimum:  Change oxygen saturation probe site  4. Every 4-6 hours:  If on nasal continuous positive airway pressure, respiratory therapy assess nares and determine need for appliance change or resting period.   3/8/2023 1700 by Bigg Multani RN  Outcome: Adequate for Discharge  3/8/2023 0440 by Liza Zarco RN  Outcome: Progressing     Problem: Pain  Goal: Verbalizes/displays adequate comfort level or baseline comfort level  3/8/2023 1700 by Bigg Multani RN  Outcome: Adequate for Discharge  3/8/2023 0440 by Liza Zarco RN  Outcome: Progressing     Problem: Nutrition Deficit:  Goal: Optimize nutritional status  3/8/2023 1700 by Bigg Multani RN  Outcome: Adequate for Discharge  3/8/2023 0440 by Liza Zarco RN  Outcome: Progressing

## 2023-03-08 NOTE — PROGRESS NOTES
Pulmonary Progress Note  [facility]      Patient - Jill Harrell,  Age - 57 y.o.    - 1960      Room Number - -01   N -  [id number]   [facility]  Date of Admission -  2023  9:54 AM        Consulting Kelsey Reyes MD  Primary Care Physician - Shane Vu, APRN - CNP     SUBJECTIVE   She looks quite comfortable, denies any worsening dyspnea. Chest x-ray reviewed, and there is significant improvement in right pleural effusion    OBJECTIVE   VITALS    height is 5' 5\" (1.651 m) and weight is 235 lb 3.7 oz (106.7 kg). Her oral temperature is 97.6 °F (36.4 °C). Her blood pressure is 140/57 (abnormal) and her pulse is 50. Her respiration is 18 and oxygen saturation is 97%. Body mass index is 39.14 kg/m². Temperature Range: Temp: 97.6 °F (36.4 °C) Temp  Av.7 °F (36.5 °C)  Min: 96.8 °F (36 °C)  Max: 98.4 °F (36.9 °C)  BP Range:  Systolic (53KIM), GBO:286 , Min:93 , COD:273     Diastolic (07LXH), DXK:87, Min:39, Max:68    Pulse Range: Pulse  Av.8  Min: 46  Max: 64  Respiration Range: Resp  Av.3  Min: 16  Max: 18  Current Pulse Ox[de-identified]  SpO2: 97 %  24HR Pulse Ox Range:  SpO2  Av %  Min: 96 %  Max: 100 %  Oxygen Amount and Delivery: O2 Flow Rate (L/min): 3.5 L/min    Wt Readings from Last 3 Encounters:   23 235 lb 3.7 oz (106.7 kg)   23 248 lb (112.5 kg)   23 248 lb 2.6 oz (112.6 kg)       I/O (24 Hours)    Intake/Output Summary (Last 24 hours) at 3/8/2023 1452  Last data filed at 3/8/2023 1245  Gross per 24 hour   Intake 1590 ml   Output 2550 ml   Net -960 ml       EXAM     General Appearance  Awake, alert, oriented, in no acute distress  HEENT - normocephalic, atraumatic.  []  Mallampati  [x] Crowded airway   [] Macroglossia  []  Retrognathia  [] Micrognathia  []  Normal tongue size []  Normal Bite  [] [place] sign positive    Neck - Supple,  trachea midline   Lungs -diminished but no wheezes or rhonchi  Cardiovascular - Heart sounds are normal.  Regular rate and rhythm   Abdomen - Soft, nontender, nondistended, no masses or organomegaly  Neurologic - There are no focal motor or sensory deficits  Skin - No bruising or bleeding  Extremities - No clubbing, cyanosis, +edema    MEDS      [START ON 3/9/2023] linaCLOtide  72 mcg Oral QAM AC    oxymetazoline  2 spray Each Nostril BID    insulin glargine  57 Units SubCUTAneous BID    polyethylene glycol  17 g Oral Daily    docusate sodium  100 mg Oral BID    busPIRone  7.5 mg Oral TID    carvedilol  3.125 mg Oral BID    insulin lispro  0-16 Units SubCUTAneous 4x Daily AC & HS    guaiFENesin  600 mg Oral BID    bumetanide  3 mg Oral BID    dextromethorphan  60 mg Oral 2 times per day    sodium chloride flush  5-40 mL IntraVENous 2 times per day    heparin (porcine)  5,000 Units SubCUTAneous 3 times per day    allopurinol  100 mg Oral Daily    aspirin  81 mg Oral Daily    atorvastatin  80 mg Oral Nightly    gabapentin  300 mg Oral Daily    pantoprazole  40 mg Oral QAM AC    tamsulosin  0.4 mg Oral Daily    ticagrelor  90 mg Oral BID      sodium chloride      dextrose       sodium chloride, anticoagulant sodium citrate, anticoagulant sodium citrate, melatonin, benzonatate, sodium chloride, magnesium sulfate, ondansetron **OR** ondansetron, acetaminophen **OR** acetaminophen, glucose, dextrose bolus **OR** dextrose bolus, glucagon (rDNA), dextrose, albuterol    LABS   CBC   Recent Labs     03/06/23  0925   WBC 9.5   HGB 11.4*   HCT 35.2*   MCV 97.0   *     BMP:   Lab Results   Component Value Date/Time     03/08/2023 06:01 AM    K 3.9 03/08/2023 06:01 AM     03/08/2023 06:01 AM    CO2 26 03/08/2023 06:01 AM    BUN 71 03/08/2023 06:01 AM    LABALBU 3.0 02/24/2023 06:15 AM    CREATININE 2.71 03/08/2023 06:01 AM    CALCIUM 9.0 03/08/2023 06:01 AM    GFRAA 27 09/06/2022 04:40 PM    LABGLOM 19 03/08/2023 06:01 AM     ABGs:  Lab Results   Component Value Date/Time    PHART 7.429 04/24/2022 01:42 PM    PO2ART 47.6 04/24/2022 01:42 PM    QTC4HYV 45.9 04/24/2022 01:42 PM      Lab Results   Component Value Date/Time    MODE CONDITION NO LONGER WARRANTS 04/06/2021 07:49 PM     Ionized Calcium:  No results found for: IONCA  Magnesium:    Lab Results   Component Value Date/Time    MG 2.2 02/12/2023 06:07 AM     Phosphorus:    Lab Results   Component Value Date/Time    PHOS 4.0 01/27/2023 03:09 AM        LIVER PROFILE No results for input(s): AST, ALT, LIPASE, BILIDIR, BILITOT, ALKPHOS in the last 72 hours. Invalid input(s): AMYLASE,  ALB  INR No results for input(s): INR in the last 72 hours.   PTT   Lab Results   Component Value Date    APTT 27.5 11/10/2021         RADIOLOGY     (See actual reports for details)    ASSESSMENT/PLAN   Volume overload/CHF with diastolic heart failure  Bilateral pleural effusions right greater than left  Acute hypoxic respiratory failure, currently on 4 L nasal cannula, baseline is 3 L  Chronic kidney disease stage IV  ERICK  CHF with ejection fraction  Type 2 diabetes  Morbid obesity  Restrictive pattern on PFTs  History of DVT with chronic lymphedema  Non-smoker  Full code    Significantly improved over the last several days  No objection to discharge  The big issue will be trying to keep her volume status controlled  No need for thoracentesis  She has an appointment with me March 26 and the [place] office already  Electronically signed by Aarti Hunter MD on 3/8/2023 at 2:52 PM

## 2023-03-08 NOTE — CARE COORDINATION
Transportation arranged for patient to go to [address] [place] [address] at 5:00pm via [employer]. Facility informed. Bedside nurse informed. Informed patient and sister was on the phone as well.      Number for Report: [phone] ask for Janel Neely    Electronically signed by AZ Gamez on 3/8/2023 at 9:24 AM

## 2023-03-08 NOTE — PROGRESS NOTES
[address]   OCCUPATIONAL THERAPY MISSED TREATMENT NOTE   INPATIENT   Date: 3/8/23  Patient Name: Raffi Pfeiffer       Room: [facility]/2925-27  MRN: [id number]   Account #: [de-identified]    : 1960  (57 y.o.)  Gender: female   Referring Practitioner: Elisa Bowen MD  Diagnosis: Hypoxia             REASON FOR MISSED TREATMENT:  Patient at testing and/or off the floor   928-   Hemodialysis         Nguyen Cottrell SYBIL

## 2023-03-08 NOTE — PROGRESS NOTES
Department of Internal Medicine  Nephrology Lovely Bains MD  Progress Note    Reason for consultation: Management of chronic kidney disease stage IV. Consulting physician: Geeta Mckeon MD.    Interval history:    Patient seen and examined today bedside. Patient had isolated ultrafiltration treatment with  2 consecutive treatments her renal function appears stable at 2.6 to 2.7 mg/dL she has increase in exercise tolerance and radiologically the chest x-ray shows improvement in edema   Patient remains On bumex 3 mg oral bid. History of present illness: This is a 57 y.o. female with a significant past medical history of Obesity, type 2 diabetes mellitus with diabetic nephropathy, coronary artery disease [s/p CABG x3], systemic hypertension, heart failure with preserved ejection fraction [HFrEF] and chronic kidney disease stage IV secondary to diabetic and hypertensive nephropathy [was on hemodialysis for several months until discontinuation in August 2022 due to some recovery of renal function], who was sent in from the [address] [place] [address] for further evaluation of hypoxia. She stated that she has had progressively worsening shortness of breath and cough over the past 3 days prior to presentation and had diarrhea for 2 days. She also complains of general body aches, fatigue and dysuria. She is on chronic oxygen at the facility usually at 3 L/min flow. Oxygen saturation was in the 80% range even with increasing O2 flow to 6 L/min and hence presentation to the hospital.  Vital signs were stable at presentation with blood pressure 130/68 mmHg. Chest x-ray at presentation showed mild pulmonary vascular congestion with increased interstitial opacities at the lung bases. Laboratory studies revealed BUN/creatinine 50/2.39 mg/dL and hence nephrology consultation.     Scheduled Meds:   [START ON 3/9/2023] linaCLOtide  72 mcg Oral QAM AC    oxymetazoline  2 spray Each Nostril BID    insulin glargine  57 Units SubCUTAneous BID    polyethylene glycol  17 g Oral Daily    docusate sodium  100 mg Oral BID    busPIRone  7.5 mg Oral TID    carvedilol  3.125 mg Oral BID    insulin lispro  0-16 Units SubCUTAneous 4x Daily AC & HS    guaiFENesin  600 mg Oral BID    bumetanide  3 mg Oral BID    dextromethorphan  60 mg Oral 2 times per day    sodium chloride flush  5-40 mL IntraVENous 2 times per day    heparin (porcine)  5,000 Units SubCUTAneous 3 times per day    allopurinol  100 mg Oral Daily    aspirin  81 mg Oral Daily    atorvastatin  80 mg Oral Nightly    gabapentin  300 mg Oral Daily    pantoprazole  40 mg Oral QAM AC    tamsulosin  0.4 mg Oral Daily    ticagrelor  90 mg Oral BID     Continuous Infusions:   sodium chloride      dextrose       Physical Exam:    VITALS:  BP (!) 140/57   Pulse 50   Temp 97.6 °F (36.4 °C) (Oral)   Resp 18   Ht 5' 5\" (1.651 m)   Wt 235 lb 3.7 oz (106.7 kg)   SpO2 97%   BMI 39.14 kg/m²   TEMPERATURE:  Current - Temp: 97.6 °F (36.4 °C);  Max - Temp  Av.7 °F (36.5 °C)  Min: 96.8 °F (36 °C)  Max: 98.4 °F (36.9 °C)  RESPIRATIONS RANGE: Resp  Av.3  Min: 16  Max: 18  PULSE RANGE: Pulse  Av.8  Min: 46  Max: 64  BLOOD PRESSURE RANGE:  Systolic (61LQM), JNN:184 , Min:93 , OIP:273 ; Diastolic (99WVH), QFK:43, Min:39, Max:68  PULSE OXIMETRY RANGE: SpO2  Av %  Min: 96 %  Max: 100 %  24HR INTAKE/OUTPUT:    Intake/Output Summary (Last 24 hours) at 3/8/2023 1559  Last data filed at 3/8/2023 1245  Gross per 24 hour   Intake 1590 ml   Output 2550 ml   Net -960 ml       Constitutional: alert, appears stated age, and cooperative    Skin: Skin color, texture, turgor normal. No rashes or lesions    Head: Normocephalic, without obvious abnormality, atraumatic     Cardiovascular/Edema: regular rate and rhythm, S1, S2 normal, no murmur, click, rub or gallop    Respiratory: Diminished but significantly improved breath sounds with bilateral basal rales    Abdomen: soft, non-tender; bowel sounds normal; no masses,  no organomegaly    Back: symmetric, no curvature. ROM normal. No CVA tenderness. Extremities: Pitting edema more on left foot. Edema significantly improved from before. Left arm AV fistula with good thrill and bruit. Neuro:  Grossly normal    CBC:   Recent Labs     03/06/23  0925   WBC 9.5   HGB 11.4*   *       BMP:    Recent Labs     03/06/23  0925 03/07/23  0531 03/08/23  0601    143 142   K 4.3 4.0 3.9    106 107   CO2 24 26 26   BUN 69* 69* 71*   CREATININE 2.65* 2.62* 2.71*   GLUCOSE 307* 189* 159*       Lab Results   Component Value Date/Time    NITRU NEGATIVE 02/27/2023 03:47 PM    COLORU Yellow 02/27/2023 03:47 PM    PHUR 6.5 02/27/2023 03:47 PM    WBCUA 0 TO 2 02/27/2023 03:47 PM    RBCUA 0 TO 2 02/27/2023 03:47 PM    MUCUS 1+ 02/07/2023 03:00 PM    TRICHOMONAS NOT REPORTED 11/14/2021 02:05 AM    YEAST FEW 02/07/2023 03:00 PM    BACTERIA None 02/27/2023 03:47 PM    SPECGRAV 1.011 02/27/2023 03:47 PM    LEUKOCYTESUR NEGATIVE 02/27/2023 03:47 PM    UROBILINOGEN Normal 02/27/2023 03:47 PM    BILIRUBINUR NEGATIVE 02/27/2023 03:47 PM    GLUCOSEU NEGATIVE 02/27/2023 03:47 PM    KETUA NEGATIVE 02/27/2023 03:47 PM    AMORPHOUS 1+ 02/25/2023 03:15 AM     Urine Sodium:     Lab Results   Component Value Date/Time    JASON 47 11/14/2021 02:04 AM     Urine Chloride:    Lab Results   Component Value Date/Time    CLUR 26 11/14/2021 02:04 AM     Urine Protein:     Lab Results   Component Value Date/Time    TPU 20 11/12/2021 10:30 AM     Urine Creatinine:     Lab Results   Component Value Date/Time    LABCREA 65.4 01/26/2023 01:10 PM     IMPRESSION/RECOMMENDATIONS:      1. Chronic kidney disease stage IV [baseline serum creatinine 2 to 3 mg/dL]  in this patient is consistent with diabetic and hypertensive nephropathy as well as cardiorenal syndrome.   She has pulmonary edema, worsening renal function while on high dose diuretics and has indications for initiation of acute dialysis/Ultrafiltration. Status post tunnel catheter placed on 3/3/2023 with first dialysis/UF on 3/3/2023. Left arm AVF unsuccessfully accessed due to infiltration. Plan:   Continue Bumex 3 mg p.o. twice daily. Monitor urine output closely. Basic metabolic profile daily. Plan for Intermittent ultrafiltration with HD Monday Wednesday Friday schedule. 2.  Shortness of breath - multifactorial etiology including 2nd  to decompensated diastolic heart failure-Improving  Daily weights. 1200 mls fluid restriction. 2 g sodium per day. 3.  Systemic hypertension - blood pressure control is adequate. 4.  Borderline hyperkalemia -stable    5. Right pleural effusion -Improving on chest x-ray    Plan for discharge today to [facility] to follow-up with .         Yosef Bedolla MD    3/8/2023 3:59 PM       Jhonny Lundy M.D, [facility]  Nephrologist

## 2023-03-08 NOTE — PROGRESS NOTES
HEMODIALYSIS PRE-TREATMENT NOTE    Patient Identifiers prior to treatment: name, birthdate and band    Isolation Required: MRSA                      Isolation Type: contact       (please document if patient is being managed as a PUI/COVID-19 patient)        Hepatitis status:                           Date Drawn                             Result  Hepatitis B Surface Antigen 03/02/2023     neg                     Hepatitis B Surface Antibody 03/02/2023 neg        Hepatitis B Core Antibody            How was Hepatitis Status verified: labs     Was a copy of the labs you documented provided to facility for the patient's chart: yes    Hemodialysis orders verified: yes    Access Within normal limits ( I.e. s/s of infection,...): yes     Pre-Assessment completed: yes By Dinesh Figueroa    Pre-dialysis report received from: Rn                      Time: 0915

## 2023-03-08 NOTE — PLAN OF CARE
Problem: Discharge Planning  Goal: Discharge to home or other facility with appropriate resources  3/8/2023 0440 by Yenni Chan RN  Outcome: Progressing     Problem: Safety - Adult  Goal: Free from fall injury  3/8/2023 0440 by Yenni Chan RN  Outcome: Progressing     Problem: Chronic Conditions and Co-morbidities  Goal: Patient's chronic conditions and co-morbidity symptoms are monitored and maintained or improved  3/8/2023 0440 by Yenni Chan RN  Outcome: Progressing     Problem: Skin/Tissue Integrity  Goal: Absence of new skin breakdown  Description: 1. Monitor for areas of redness and/or skin breakdown  2. Assess vascular access sites hourly  3. Every 4-6 hours minimum:  Change oxygen saturation probe site  4. Every 4-6 hours:  If on nasal continuous positive airway pressure, respiratory therapy assess nares and determine need for appliance change or resting period.   3/8/2023 0440 by Yenni Chan RN  Outcome: Progressing     Problem: Nutrition Deficit:  Goal: Optimize nutritional status  3/8/2023 0440 by Yenni Chan RN  Outcome: Progressing

## 2023-03-08 NOTE — PROGRESS NOTES
Attempted to call report to [facility]. No answer. Will attempt to call report again as time allows.

## 2023-03-08 NOTE — PROGRESS NOTES
HEMODIALYSIS POST TREATMENT NOTE    Treatment time ordered: 180    Actual treatment time: 180    UltraFiltration Goal: 1500  UltraFiltration Removed: 1500      Pre Treatment weight: 108.2  Post Treatment weight: 106.7  Estimated Dry Weight: na    Access used:     Central Venous Catheter:          Tunneled or Non-tunneled: tunneled           Site: right chest          Access Flow: good      Internal Access:       AV Fistula or AV Graft: na         Site: na       Access Flow: na       Sign and symptoms of infection: none       If YES: na    Medications or blood products given: no    Chronic outpatient schedule: MWF    Chronic outpatient unit: [facility]    Summary of response to treatment: the pt did well on treatment    Explain if orders NOT met, was physician notified:trinh      ACES flowsheet faxed to patient unit/ placed in patient chart: yes    Post assessment completed: yes By Pio Au    Report given to: [address] [place] [address] documented Safety Checks include the followin) Access and face visible at all times. 2) All connections and blood lines are secure with no kinks. 3) NVL alarm engaged. 4) Hemosafe device applied (if applicable). 5) No collapse of Arterial or Venous blood chambers. 6) All blood lines / pump segments in the air detectors.

## 2023-03-08 NOTE — PROGRESS NOTES
Physical Therapy Cancel Note      DATE: 3/8/2023    NAME: Aleksandar Hoang  MRN: [id number]   : 1960      Patient not seen this date for Physical Therapy due to:    Cx, pt is in dialysis at this time per RN Nilesh Martinez, pt will then be discharging to [facility].       Electronically signed by Riya Dexter PTA on 3/8/2023 at 1:15 PM

## 2023-03-08 NOTE — PROGRESS NOTES
..Discharge teaching and instructions for diagnosis of hypoxia and CHF completed with patient using teachback method. AVS reviewed. Printed prescriptions given to patient. Patient voiced understanding regarding prescriptions, follow up appointments, and care of self at home.  Discharged in a wheelchair to  skilled nursing per EMS transportation

## 2023-03-09 ENCOUNTER — HOSPITAL ENCOUNTER (OUTPATIENT)
Age: 63
Setting detail: SPECIMEN
Discharge: HOME | End: 2023-03-09

## 2023-03-09 LAB — URATE SERPL-MCNC: 10.1 MG/DL (ref 2.4–5.7)

## 2023-03-09 PROCEDURE — P9603 ONE-WAY ALLOW PRORATED MILES: HCPCS

## 2023-03-09 PROCEDURE — 84550 ASSAY OF BLOOD/URIC ACID: CPT

## 2023-03-09 PROCEDURE — 36415 COLL VENOUS BLD VENIPUNCTURE: CPT

## 2023-03-21 ENCOUNTER — HOSPITAL ENCOUNTER (INPATIENT)
Age: 63
LOS: 5 days | Discharge: NURSING, REHAB OR LONG-TERM CARE | DRG: 313 | End: 2023-03-27
Attending: EMERGENCY MEDICINE | Admitting: STUDENT IN AN ORGANIZED HEALTH CARE EDUCATION/TRAINING PROGRAM
Payer: COMMERCIAL

## 2023-03-21 ENCOUNTER — APPOINTMENT (OUTPATIENT)
Dept: GENERAL RADIOLOGY | Age: 63
DRG: 313 | End: 2023-03-21
Payer: COMMERCIAL

## 2023-03-21 DIAGNOSIS — G89.29 CHRONIC MIDLINE LOW BACK PAIN WITH BILATERAL SCIATICA: ICD-10-CM

## 2023-03-21 DIAGNOSIS — R07.9 CHEST PAIN, UNSPECIFIED TYPE: Primary | ICD-10-CM

## 2023-03-21 DIAGNOSIS — M54.41 CHRONIC MIDLINE LOW BACK PAIN WITH BILATERAL SCIATICA: ICD-10-CM

## 2023-03-21 DIAGNOSIS — M54.42 CHRONIC MIDLINE LOW BACK PAIN WITH BILATERAL SCIATICA: ICD-10-CM

## 2023-03-21 LAB
ABSOLUTE EOS #: 0.2 K/UL (ref 0–0.4)
ABSOLUTE LYMPH #: 1.1 K/UL (ref 1–4.8)
ABSOLUTE MONO #: 0.6 K/UL (ref 0.1–1.3)
ANION GAP SERPL CALCULATED.3IONS-SCNC: 11 MMOL/L (ref 9–17)
BASOPHILS # BLD: 1 % (ref 0–2)
BASOPHILS ABSOLUTE: 0 K/UL (ref 0–0.2)
BNP SERPL-MCNC: 1309 PG/ML
BUN SERPL-MCNC: 23 MG/DL (ref 8–23)
CALCIUM SERPL-MCNC: 9.1 MG/DL (ref 8.6–10.4)
CHLORIDE SERPL-SCNC: 101 MMOL/L (ref 98–107)
CO2 SERPL-SCNC: 28 MMOL/L (ref 20–31)
CREAT SERPL-MCNC: 2.51 MG/DL (ref 0.5–0.9)
CRP SERPL HS-MCNC: 3.2 MG/L (ref 0–5)
D DIMER BLD IA.RAPID-MCNC: 1.35 MG/L FEU (ref 0–0.59)
EOSINOPHILS RELATIVE PERCENT: 3 % (ref 0–4)
GFR SERPL CREATININE-BSD FRML MDRD: 21 ML/MIN/1.73M2
GLUCOSE SERPL-MCNC: 190 MG/DL (ref 70–99)
HCT VFR BLD AUTO: 31.6 % (ref 36–46)
HGB BLD-MCNC: 10.3 G/DL (ref 12–16)
LYMPHOCYTES # BLD: 15 % (ref 24–44)
MCH RBC QN AUTO: 31.6 PG (ref 26–34)
MCHC RBC AUTO-ENTMCNC: 32.5 G/DL (ref 31–37)
MCV RBC AUTO: 97.2 FL (ref 80–100)
MONOCYTES # BLD: 8 % (ref 1–7)
PDW BLD-RTO: 15.5 % (ref 11.5–14.9)
PLATELET # BLD AUTO: 198 K/UL (ref 150–450)
PMV BLD AUTO: 8.4 FL (ref 6–12)
POTASSIUM SERPL-SCNC: 3.5 MMOL/L (ref 3.7–5.3)
RBC # BLD: 3.25 M/UL (ref 4–5.2)
SEG NEUTROPHILS: 73 % (ref 36–66)
SEGMENTED NEUTROPHILS ABSOLUTE COUNT: 5.5 K/UL (ref 1.3–9.1)
SODIUM SERPL-SCNC: 140 MMOL/L (ref 135–144)
TROPONIN I SERPL DL<=0.01 NG/ML-MCNC: 86 NG/L (ref 0–14)
TROPONIN I SERPL DL<=0.01 NG/ML-MCNC: 91 NG/L (ref 0–14)
WBC # BLD AUTO: 7.4 K/UL (ref 3.5–11)

## 2023-03-21 PROCEDURE — 85379 FIBRIN DEGRADATION QUANT: CPT

## 2023-03-21 PROCEDURE — 99285 EMERGENCY DEPT VISIT HI MDM: CPT

## 2023-03-21 PROCEDURE — 85025 COMPLETE CBC W/AUTO DIFF WBC: CPT

## 2023-03-21 PROCEDURE — 96376 TX/PRO/DX INJ SAME DRUG ADON: CPT

## 2023-03-21 PROCEDURE — 80048 BASIC METABOLIC PNL TOTAL CA: CPT

## 2023-03-21 PROCEDURE — 83880 ASSAY OF NATRIURETIC PEPTIDE: CPT

## 2023-03-21 PROCEDURE — 84484 ASSAY OF TROPONIN QUANT: CPT

## 2023-03-21 PROCEDURE — 96374 THER/PROPH/DIAG INJ IV PUSH: CPT

## 2023-03-21 PROCEDURE — 86140 C-REACTIVE PROTEIN: CPT

## 2023-03-21 PROCEDURE — 96372 THER/PROPH/DIAG INJ SC/IM: CPT

## 2023-03-21 PROCEDURE — 71045 X-RAY EXAM CHEST 1 VIEW: CPT

## 2023-03-21 PROCEDURE — 6360000002 HC RX W HCPCS: Performed by: STUDENT IN AN ORGANIZED HEALTH CARE EDUCATION/TRAINING PROGRAM

## 2023-03-21 PROCEDURE — 93005 ELECTROCARDIOGRAM TRACING: CPT | Performed by: STUDENT IN AN ORGANIZED HEALTH CARE EDUCATION/TRAINING PROGRAM

## 2023-03-21 PROCEDURE — 36415 COLL VENOUS BLD VENIPUNCTURE: CPT

## 2023-03-21 PROCEDURE — 6370000000 HC RX 637 (ALT 250 FOR IP): Performed by: STUDENT IN AN ORGANIZED HEALTH CARE EDUCATION/TRAINING PROGRAM

## 2023-03-21 RX ORDER — FENTANYL CITRATE 0.05 MG/ML
50 INJECTION, SOLUTION INTRAMUSCULAR; INTRAVENOUS ONCE
Status: COMPLETED | OUTPATIENT
Start: 2023-03-21 | End: 2023-03-21

## 2023-03-21 RX ORDER — LIDOCAINE 4 G/G
1 PATCH TOPICAL ONCE
Status: COMPLETED | OUTPATIENT
Start: 2023-03-21 | End: 2023-03-22

## 2023-03-21 RX ORDER — ORPHENADRINE CITRATE 30 MG/ML
60 INJECTION INTRAMUSCULAR; INTRAVENOUS ONCE
Status: COMPLETED | OUTPATIENT
Start: 2023-03-21 | End: 2023-03-21

## 2023-03-21 RX ADMIN — FENTANYL CITRATE 50 MCG: 0.05 INJECTION, SOLUTION INTRAMUSCULAR; INTRAVENOUS at 22:15

## 2023-03-21 RX ADMIN — FENTANYL CITRATE 50 MCG: 0.05 INJECTION, SOLUTION INTRAMUSCULAR; INTRAVENOUS at 19:51

## 2023-03-21 RX ADMIN — ORPHENADRINE CITRATE 60 MG: 30 INJECTION INTRAMUSCULAR; INTRAVENOUS at 22:54

## 2023-03-21 ASSESSMENT — ENCOUNTER SYMPTOMS: BACK PAIN: 1

## 2023-03-21 ASSESSMENT — PAIN - FUNCTIONAL ASSESSMENT: PAIN_FUNCTIONAL_ASSESSMENT: 0-10

## 2023-03-21 ASSESSMENT — PAIN DESCRIPTION - LOCATION: LOCATION: BACK;CHEST

## 2023-03-21 ASSESSMENT — PAIN SCALES - GENERAL
PAINLEVEL_OUTOF10: 10
PAINLEVEL_OUTOF10: 10

## 2023-03-21 ASSESSMENT — PAIN DESCRIPTION - PAIN TYPE: TYPE: ACUTE PAIN

## 2023-03-21 NOTE — ED TRIAGE NOTES
Mode of arrival (squad #, walk in, police, etc) : life squad 8    Chief complaint(s): back pain    Arrival Note (brief scenario, treatment PTA, etc). : Pt arrives to ED c/o 10/10 upper lumbar back pain, radiating into chest (7/10). Pt has cardiac (CHF, stents) and renal history (completed dialysis yesterday; still makes urine). C= \"Have you ever felt that you should Cut down on your drinking? \"  No  A= \"Have people Annoyed you by criticizing your drinking? \"  No  G= \"Have you ever felt bad or Guilty about your drinking? \"  No  E= \"Have you ever had a drink as an Eye-opener first thing in the morning to steady your nerves or to help a hangover? \"  No      Deferred []      Reason for deferring: N/A    *If yes to two or more: probable alcohol abuse. *
generalized

## 2023-03-21 NOTE — ED NOTES
No success in first IV start attempt; pt states she usually requires an ultrasound IV     Nidhi Cheek RN  03/21/23 1934

## 2023-03-22 ENCOUNTER — APPOINTMENT (OUTPATIENT)
Dept: CT IMAGING | Age: 63
DRG: 313 | End: 2023-03-22
Payer: COMMERCIAL

## 2023-03-22 ENCOUNTER — APPOINTMENT (OUTPATIENT)
Dept: GENERAL RADIOLOGY | Age: 63
DRG: 313 | End: 2023-03-22
Payer: COMMERCIAL

## 2023-03-22 ENCOUNTER — APPOINTMENT (OUTPATIENT)
Dept: NON INVASIVE DIAGNOSTICS | Age: 63
DRG: 313 | End: 2023-03-22
Payer: COMMERCIAL

## 2023-03-22 PROBLEM — R07.89 ATYPICAL CHEST PAIN: Status: ACTIVE | Noted: 2023-03-22

## 2023-03-22 PROBLEM — R07.9 CHEST PAIN: Status: ACTIVE | Noted: 2023-03-22

## 2023-03-22 LAB
ANION GAP SERPL CALCULATED.3IONS-SCNC: 12 MMOL/L (ref 9–17)
BUN SERPL-MCNC: 24 MG/DL (ref 8–23)
CALCIUM SERPL-MCNC: 8.9 MG/DL (ref 8.6–10.4)
CHLORIDE SERPL-SCNC: 102 MMOL/L (ref 98–107)
CO2 SERPL-SCNC: 27 MMOL/L (ref 20–31)
CREAT SERPL-MCNC: 2.87 MG/DL (ref 0.5–0.9)
GFR SERPL CREATININE-BSD FRML MDRD: 18 ML/MIN/1.73M2
GLUCOSE BLD-MCNC: 131 MG/DL (ref 65–105)
GLUCOSE BLD-MCNC: 190 MG/DL (ref 65–105)
GLUCOSE BLD-MCNC: 229 MG/DL (ref 65–105)
GLUCOSE BLD-MCNC: 283 MG/DL (ref 65–105)
GLUCOSE SERPL-MCNC: 357 MG/DL (ref 70–99)
PHOSPHATE SERPL-MCNC: 3.8 MG/DL (ref 2.6–4.5)
POTASSIUM SERPL-SCNC: 3.9 MMOL/L (ref 3.7–5.3)
SODIUM SERPL-SCNC: 141 MMOL/L (ref 135–144)

## 2023-03-22 PROCEDURE — 6370000000 HC RX 637 (ALT 250 FOR IP): Performed by: STUDENT IN AN ORGANIZED HEALTH CARE EDUCATION/TRAINING PROGRAM

## 2023-03-22 PROCEDURE — 72100 X-RAY EXAM L-S SPINE 2/3 VWS: CPT

## 2023-03-22 PROCEDURE — 71260 CT THORAX DX C+: CPT | Performed by: STUDENT IN AN ORGANIZED HEALTH CARE EDUCATION/TRAINING PROGRAM

## 2023-03-22 PROCEDURE — 2500000003 HC RX 250 WO HCPCS: Performed by: INTERNAL MEDICINE

## 2023-03-22 PROCEDURE — 97530 THERAPEUTIC ACTIVITIES: CPT

## 2023-03-22 PROCEDURE — 90935 HEMODIALYSIS ONE EVALUATION: CPT

## 2023-03-22 PROCEDURE — 97166 OT EVAL MOD COMPLEX 45 MIN: CPT

## 2023-03-22 PROCEDURE — 82947 ASSAY GLUCOSE BLOOD QUANT: CPT

## 2023-03-22 PROCEDURE — 36415 COLL VENOUS BLD VENIPUNCTURE: CPT

## 2023-03-22 PROCEDURE — 6360000004 HC RX CONTRAST MEDICATION: Performed by: STUDENT IN AN ORGANIZED HEALTH CARE EDUCATION/TRAINING PROGRAM

## 2023-03-22 PROCEDURE — 5A1D70Z PERFORMANCE OF URINARY FILTRATION, INTERMITTENT, LESS THAN 6 HOURS PER DAY: ICD-10-PCS | Performed by: INTERNAL MEDICINE

## 2023-03-22 PROCEDURE — 84100 ASSAY OF PHOSPHORUS: CPT

## 2023-03-22 PROCEDURE — 99223 1ST HOSP IP/OBS HIGH 75: CPT | Performed by: STUDENT IN AN ORGANIZED HEALTH CARE EDUCATION/TRAINING PROGRAM

## 2023-03-22 PROCEDURE — 6360000002 HC RX W HCPCS: Performed by: STUDENT IN AN ORGANIZED HEALTH CARE EDUCATION/TRAINING PROGRAM

## 2023-03-22 PROCEDURE — 1200000000 HC SEMI PRIVATE

## 2023-03-22 PROCEDURE — 2580000003 HC RX 258: Performed by: STUDENT IN AN ORGANIZED HEALTH CARE EDUCATION/TRAINING PROGRAM

## 2023-03-22 PROCEDURE — 80048 BASIC METABOLIC PNL TOTAL CA: CPT

## 2023-03-22 RX ORDER — SODIUM CHLORIDE 0.9 % (FLUSH) 0.9 %
10 SYRINGE (ML) INJECTION PRN
Status: DISCONTINUED | OUTPATIENT
Start: 2023-03-22 | End: 2023-03-27 | Stop reason: HOSPADM

## 2023-03-22 RX ORDER — SODIUM CHLORIDE 0.9 % (FLUSH) 0.9 %
5-40 SYRINGE (ML) INJECTION PRN
Status: DISCONTINUED | OUTPATIENT
Start: 2023-03-22 | End: 2023-03-27 | Stop reason: HOSPADM

## 2023-03-22 RX ORDER — ASPIRIN 81 MG/1
81 TABLET, CHEWABLE ORAL DAILY
Status: DISCONTINUED | OUTPATIENT
Start: 2023-03-22 | End: 2023-03-27 | Stop reason: HOSPADM

## 2023-03-22 RX ORDER — POLYETHYLENE GLYCOL 3350 17 G/17G
17 POWDER, FOR SOLUTION ORAL DAILY PRN
Status: DISCONTINUED | OUTPATIENT
Start: 2023-03-22 | End: 2023-03-27 | Stop reason: HOSPADM

## 2023-03-22 RX ORDER — INSULIN GLARGINE 100 [IU]/ML
50 INJECTION, SOLUTION SUBCUTANEOUS 2 TIMES DAILY
Status: DISCONTINUED | OUTPATIENT
Start: 2023-03-22 | End: 2023-03-22

## 2023-03-22 RX ORDER — SODIUM CHLORIDE 0.9 % (FLUSH) 0.9 %
5-40 SYRINGE (ML) INJECTION EVERY 12 HOURS SCHEDULED
Status: DISCONTINUED | OUTPATIENT
Start: 2023-03-22 | End: 2023-03-27 | Stop reason: HOSPADM

## 2023-03-22 RX ORDER — INSULIN LISPRO 100 [IU]/ML
0-8 INJECTION, SOLUTION INTRAVENOUS; SUBCUTANEOUS
Status: DISCONTINUED | OUTPATIENT
Start: 2023-03-22 | End: 2023-03-27 | Stop reason: HOSPADM

## 2023-03-22 RX ORDER — ACETAMINOPHEN 650 MG/1
650 SUPPOSITORY RECTAL EVERY 6 HOURS PRN
Status: DISCONTINUED | OUTPATIENT
Start: 2023-03-22 | End: 2023-03-27 | Stop reason: HOSPADM

## 2023-03-22 RX ORDER — ONDANSETRON 2 MG/ML
4 INJECTION INTRAMUSCULAR; INTRAVENOUS EVERY 6 HOURS PRN
Status: DISCONTINUED | OUTPATIENT
Start: 2023-03-22 | End: 2023-03-27 | Stop reason: HOSPADM

## 2023-03-22 RX ORDER — BUMETANIDE 1 MG/1
3 TABLET ORAL 2 TIMES DAILY
Status: DISCONTINUED | OUTPATIENT
Start: 2023-03-22 | End: 2023-03-27 | Stop reason: HOSPADM

## 2023-03-22 RX ORDER — DEXTROSE MONOHYDRATE 100 MG/ML
INJECTION, SOLUTION INTRAVENOUS CONTINUOUS PRN
Status: DISCONTINUED | OUTPATIENT
Start: 2023-03-22 | End: 2023-03-26 | Stop reason: SDUPTHER

## 2023-03-22 RX ORDER — HYDROCODONE BITARTRATE AND ACETAMINOPHEN 5; 325 MG/1; MG/1
1 TABLET ORAL ONCE
Status: COMPLETED | OUTPATIENT
Start: 2023-03-22 | End: 2023-03-22

## 2023-03-22 RX ORDER — 0.9 % SODIUM CHLORIDE 0.9 %
100 INTRAVENOUS SOLUTION INTRAVENOUS ONCE
Status: COMPLETED | OUTPATIENT
Start: 2023-03-22 | End: 2023-03-22

## 2023-03-22 RX ORDER — DEXTROSE MONOHYDRATE 100 MG/ML
INJECTION, SOLUTION INTRAVENOUS CONTINUOUS PRN
Status: DISCONTINUED | OUTPATIENT
Start: 2023-03-22 | End: 2023-03-27 | Stop reason: HOSPADM

## 2023-03-22 RX ORDER — TAMSULOSIN HYDROCHLORIDE 0.4 MG/1
0.4 CAPSULE ORAL DAILY
Status: DISCONTINUED | OUTPATIENT
Start: 2023-03-22 | End: 2023-03-27 | Stop reason: HOSPADM

## 2023-03-22 RX ORDER — ONDANSETRON 4 MG/1
4 TABLET, ORALLY DISINTEGRATING ORAL EVERY 8 HOURS PRN
Status: DISCONTINUED | OUTPATIENT
Start: 2023-03-22 | End: 2023-03-27 | Stop reason: HOSPADM

## 2023-03-22 RX ORDER — HEPARIN SODIUM 5000 [USP'U]/ML
5000 INJECTION, SOLUTION INTRAVENOUS; SUBCUTANEOUS EVERY 8 HOURS SCHEDULED
Status: DISCONTINUED | OUTPATIENT
Start: 2023-03-22 | End: 2023-03-27 | Stop reason: HOSPADM

## 2023-03-22 RX ORDER — ACETAMINOPHEN 325 MG/1
650 TABLET ORAL EVERY 6 HOURS PRN
Status: DISCONTINUED | OUTPATIENT
Start: 2023-03-22 | End: 2023-03-27 | Stop reason: HOSPADM

## 2023-03-22 RX ORDER — ATORVASTATIN CALCIUM 80 MG/1
80 TABLET, FILM COATED ORAL NIGHTLY
Status: DISCONTINUED | OUTPATIENT
Start: 2023-03-22 | End: 2023-03-27 | Stop reason: HOSPADM

## 2023-03-22 RX ORDER — INSULIN LISPRO 100 [IU]/ML
0-4 INJECTION, SOLUTION INTRAVENOUS; SUBCUTANEOUS NIGHTLY
Status: DISCONTINUED | OUTPATIENT
Start: 2023-03-22 | End: 2023-03-27 | Stop reason: HOSPADM

## 2023-03-22 RX ORDER — PANTOPRAZOLE SODIUM 40 MG/1
40 TABLET, DELAYED RELEASE ORAL
Status: DISCONTINUED | OUTPATIENT
Start: 2023-03-22 | End: 2023-03-27 | Stop reason: HOSPADM

## 2023-03-22 RX ORDER — BUSPIRONE HYDROCHLORIDE 5 MG/1
7.5 TABLET ORAL 3 TIMES DAILY
Status: DISCONTINUED | OUTPATIENT
Start: 2023-03-22 | End: 2023-03-27 | Stop reason: HOSPADM

## 2023-03-22 RX ORDER — SODIUM CHLORIDE 9 MG/ML
INJECTION, SOLUTION INTRAVENOUS PRN
Status: DISCONTINUED | OUTPATIENT
Start: 2023-03-22 | End: 2023-03-27 | Stop reason: HOSPADM

## 2023-03-22 RX ORDER — INSULIN GLARGINE 100 [IU]/ML
57 INJECTION, SOLUTION SUBCUTANEOUS 2 TIMES DAILY
Status: DISCONTINUED | OUTPATIENT
Start: 2023-03-22 | End: 2023-03-26

## 2023-03-22 RX ORDER — METHOCARBAMOL 750 MG/1
750 TABLET, FILM COATED ORAL 4 TIMES DAILY
Status: DISCONTINUED | OUTPATIENT
Start: 2023-03-22 | End: 2023-03-27 | Stop reason: HOSPADM

## 2023-03-22 RX ORDER — MORPHINE SULFATE 2 MG/ML
2 INJECTION, SOLUTION INTRAMUSCULAR; INTRAVENOUS EVERY 4 HOURS PRN
Status: DISCONTINUED | OUTPATIENT
Start: 2023-03-22 | End: 2023-03-23

## 2023-03-22 RX ORDER — DOCUSATE SODIUM 100 MG/1
100 CAPSULE, LIQUID FILLED ORAL 2 TIMES DAILY
Status: DISCONTINUED | OUTPATIENT
Start: 2023-03-22 | End: 2023-03-27 | Stop reason: HOSPADM

## 2023-03-22 RX ORDER — CARVEDILOL 3.12 MG/1
3.12 TABLET ORAL 2 TIMES DAILY
Status: DISCONTINUED | OUTPATIENT
Start: 2023-03-22 | End: 2023-03-27 | Stop reason: HOSPADM

## 2023-03-22 RX ADMIN — MORPHINE SULFATE 2 MG: 2 INJECTION, SOLUTION INTRAMUSCULAR; INTRAVENOUS at 08:43

## 2023-03-22 RX ADMIN — MORPHINE SULFATE 2 MG: 2 INJECTION, SOLUTION INTRAMUSCULAR; INTRAVENOUS at 13:27

## 2023-03-22 RX ADMIN — LINACLOTIDE 145 MCG: 145 CAPSULE, GELATIN COATED ORAL at 06:14

## 2023-03-22 RX ADMIN — METHOCARBAMOL 750 MG: 750 TABLET ORAL at 22:44

## 2023-03-22 RX ADMIN — BUSPIRONE HYDROCHLORIDE 7.5 MG: 5 TABLET ORAL at 22:28

## 2023-03-22 RX ADMIN — INSULIN GLARGINE 50 UNITS: 100 INJECTION, SOLUTION SUBCUTANEOUS at 07:50

## 2023-03-22 RX ADMIN — TICAGRELOR 90 MG: 90 TABLET ORAL at 22:43

## 2023-03-22 RX ADMIN — SODIUM CHLORIDE 100 ML: 9 INJECTION, SOLUTION INTRAVENOUS at 01:02

## 2023-03-22 RX ADMIN — ACETAMINOPHEN 650 MG: 325 TABLET ORAL at 07:50

## 2023-03-22 RX ADMIN — HYDROCODONE BITARTRATE AND ACETAMINOPHEN 1 TABLET: 5; 325 TABLET ORAL at 04:23

## 2023-03-22 RX ADMIN — TICAGRELOR 90 MG: 90 TABLET ORAL at 09:00

## 2023-03-22 RX ADMIN — HEPARIN SODIUM 5000 UNITS: 5000 INJECTION INTRAVENOUS; SUBCUTANEOUS at 13:26

## 2023-03-22 RX ADMIN — DOCUSATE SODIUM 100 MG: 100 CAPSULE, LIQUID FILLED ORAL at 22:41

## 2023-03-22 RX ADMIN — DOCUSATE SODIUM 100 MG: 100 CAPSULE, LIQUID FILLED ORAL at 09:26

## 2023-03-22 RX ADMIN — TAMSULOSIN HYDROCHLORIDE 0.4 MG: 0.4 CAPSULE ORAL at 13:26

## 2023-03-22 RX ADMIN — INSULIN GLARGINE 57 UNITS: 100 INJECTION, SOLUTION SUBCUTANEOUS at 22:42

## 2023-03-22 RX ADMIN — SODIUM CHLORIDE, PRESERVATIVE FREE 10 ML: 5 INJECTION INTRAVENOUS at 01:02

## 2023-03-22 RX ADMIN — INSULIN LISPRO 2 UNITS: 100 INJECTION, SOLUTION INTRAVENOUS; SUBCUTANEOUS at 07:51

## 2023-03-22 RX ADMIN — SODIUM CHLORIDE, PRESERVATIVE FREE 10 ML: 5 INJECTION INTRAVENOUS at 07:57

## 2023-03-22 RX ADMIN — HYDROCODONE BITARTRATE AND ACETAMINOPHEN 1 TABLET: 5; 325 TABLET ORAL at 09:27

## 2023-03-22 RX ADMIN — PANTOPRAZOLE SODIUM 40 MG: 40 TABLET, DELAYED RELEASE ORAL at 06:13

## 2023-03-22 RX ADMIN — ATORVASTATIN CALCIUM 80 MG: 80 TABLET, FILM COATED ORAL at 22:29

## 2023-03-22 RX ADMIN — BUSPIRONE HYDROCHLORIDE 7.5 MG: 5 TABLET ORAL at 13:26

## 2023-03-22 RX ADMIN — METHOCARBAMOL 750 MG: 750 TABLET ORAL at 17:19

## 2023-03-22 RX ADMIN — BUSPIRONE HYDROCHLORIDE 7.5 MG: 5 TABLET ORAL at 09:26

## 2023-03-22 RX ADMIN — HEPARIN SODIUM 5000 UNITS: 5000 INJECTION INTRAVENOUS; SUBCUTANEOUS at 22:28

## 2023-03-22 RX ADMIN — IOPAMIDOL 75 ML: 755 INJECTION, SOLUTION INTRAVENOUS at 01:01

## 2023-03-22 RX ADMIN — Medication 1.6 ML: at 12:47

## 2023-03-22 RX ADMIN — HEPARIN SODIUM 5000 UNITS: 5000 INJECTION INTRAVENOUS; SUBCUTANEOUS at 06:13

## 2023-03-22 RX ADMIN — Medication 1.6 ML: at 12:46

## 2023-03-22 RX ADMIN — SODIUM CHLORIDE, PRESERVATIVE FREE 10 ML: 5 INJECTION INTRAVENOUS at 22:49

## 2023-03-22 RX ADMIN — ASPIRIN 81 MG 81 MG: 81 TABLET ORAL at 09:26

## 2023-03-22 ASSESSMENT — PAIN DESCRIPTION - ORIENTATION
ORIENTATION: LOWER
ORIENTATION: LOWER
ORIENTATION: POSTERIOR
ORIENTATION: POSTERIOR

## 2023-03-22 ASSESSMENT — ENCOUNTER SYMPTOMS
WHEEZING: 0
NAUSEA: 0
VOMITING: 0
ABDOMINAL PAIN: 0
BACK PAIN: 1
DIARRHEA: 0
EYES NEGATIVE: 1
COUGH: 0

## 2023-03-22 ASSESSMENT — PAIN SCALES - GENERAL
PAINLEVEL_OUTOF10: 10
PAINLEVEL_OUTOF10: 0
PAINLEVEL_OUTOF10: 10
PAINLEVEL_OUTOF10: 2
PAINLEVEL_OUTOF10: 0
PAINLEVEL_OUTOF10: 10
PAINLEVEL_OUTOF10: 10
PAINLEVEL_OUTOF10: 0
PAINLEVEL_OUTOF10: 0
PAINLEVEL_OUTOF10: 10
PAINLEVEL_OUTOF10: 10
PAINLEVEL_OUTOF10: 0

## 2023-03-22 ASSESSMENT — PAIN DESCRIPTION - LOCATION
LOCATION: BACK

## 2023-03-22 ASSESSMENT — PAIN DESCRIPTION - DESCRIPTORS
DESCRIPTORS: SHARP
DESCRIPTORS: CRAMPING;DULL
DESCRIPTORS: ACHING
DESCRIPTORS: ACHING
DESCRIPTORS: SHARP

## 2023-03-22 ASSESSMENT — PAIN DESCRIPTION - PAIN TYPE: TYPE: ACUTE PAIN

## 2023-03-22 ASSESSMENT — PAIN DESCRIPTION - FREQUENCY: FREQUENCY: CONTINUOUS

## 2023-03-22 NOTE — ED NOTES
Pt was cleaned, linens changed, and new brief applied by write at this time.       Richelle Saunders RN  03/22/23 4655

## 2023-03-22 NOTE — H&P
intact. Sternum and manubrium appear intact. 1. No pulmonary emboli. 2. Trace left and small right-sided pleural effusions, with cardiomegaly, patchy ground-glass change, interlobular smooth septal thickening felt to represent cardiogenic pulmonary edema. 3. Subsegmental atelectatic changes. 4. There is a hypodense 17 mm thyroid nodule on the right. Follow-up outpatient nonemergent ultrasound is recommended for further evaluation. 5. Mildly enlarged precarinal lymph node which is likely reactive. No other significant lymphadenopathy. RECOMMENDATIONS: 1.7 cm incidental right thyroid nodule. Recommend thyroid US. Reference: J Am Toy Radiol. 2015 Feb;12(2): 143-50     IR TUNNELED CVC PLACE WO SQ PORT/PUMP > 5 YEARS    Addendum Date: 3/17/2023    ADDENDUM: Ultrasound image demonstrating needle access of the right internal jugular vein was obtained and stored in PACS. Result Date: 3/17/2023  PROCEDURE: ULTRASOUND GUIDED VASCULAR ACCESS. FLUOROSCOPY GUIDED PLACEMENT OF A TUNNELED CATHETER. 3/3/2023. HISTORY: ORDERING SYSTEM PROVIDED HISTORY: chronic dialysis TECHNOLOGIST PROVIDED HISTORY: chronic dialysis How many lumens are being requested?->2 What side should this line be placed? ->Either What site is the preferred site? ->No preference Clotted graft SEDATION: None FLUOROSCOPY DOSE AND TYPE: Radiation Exposure Index: 46.3 seconds; cumulative dose 10.95 mGy TECHNIQUE: This procedure was performed by Dr. Sabra Hernandez. Informed consent was obtained after a detailed explanation of the procedure including risks, benefits, and alternatives. All elements of maximal sterile barrier technique were utilized. Standard timeout was performed using two patient identifiers. The patient was pretreated with intravenous antibiotics. Initial ultrasound evaluation shows the right internal jugular vein to be patent and accessible. Static image was obtained for the patient's medical record.  The right neck and chest were prepped and

## 2023-03-22 NOTE — ED NOTES
Report given to Kyle Jimenez from ED. Report method in person   The following was reviewed with receiving RN:   Current vital signs:  /80   Pulse 78   Temp 98.4 °F (36.9 °C) (Oral)   Resp 18   Ht 5' 5\" (1.651 m)   Wt 235 lb (106.6 kg)   SpO2 98%   BMI 39.11 kg/m²                MEWS Score: 1     Any medication or safety alerts were reviewed. Any pending diagnostics and notifications were also reviewed, as well as any safety concerns or issues, abnormal labs, abnormal imaging, and abnormal assessment findings. Questions were answered.             Blayne Hicks RN  03/21/23 [id number]

## 2023-03-22 NOTE — ED PROVIDER NOTES
surgery; Cholecystectomy, open (N/A); IR TUNNELED CVC PLACE WO SQ PORT/PUMP > 5 YEARS (08/18/2021); AV fistula creation (12/14/2021); Dialysis fistula creation (Left, 12/14/2021); other surgical history (04/06/2022); back surgery; Colonoscopy; eye surgery; fracture surgery; Cardiac surgery; Cardiac catheterization (02/14/2023); and IR TUNNELED CVC PLACE WO SQ PORT/PUMP > 5 YEARS (3/3/2023). Social History     Socioeconomic History    Marital status: Single     Spouse name: Not on file    Number of children: Not on file    Years of education: Not on file    Highest education level: Not on file   Occupational History    Not on file   Tobacco Use    Smoking status: Never    Smokeless tobacco: Never   Vaping Use    Vaping Use: Never used   Substance and Sexual Activity    Alcohol use: No    Drug use: No    Sexual activity: Not Currently   Other Topics Concern    Not on file   Social History Narrative    Not on file     Social Determinants of Health     Financial Resource Strain: Low Risk     Difficulty of Paying Living Expenses: Not very hard   Food Insecurity: Not on file   Transportation Needs: Not on file   Physical Activity: Insufficiently Active    Days of Exercise per Week: 1 day    Minutes of Exercise per Session: 20 min   Stress: Not on file   Social Connections: Not on file   Intimate Partner Violence: Not on file   Housing Stability: Not on file       Family History   Problem Relation Age of Onset    Diabetes Father     Heart Failure Father        Allergies:  Adhesive tape, Isosorbide dinitrate, Ranexa [ranolazine], Metformin and related, Ace inhibitors, Iv dye [iodides], Nsaids, and Metformin and related    Home Medications:  Prior to Admission medications    Medication Sig Start Date End Date Taking?  Authorizing Provider   bumetanide (BUMEX) 1 MG tablet Take 3 tablets by mouth 2 times daily 3/6/23   Fernanda Fox MD   carvedilol (COREG) 3.125 MG tablet Take 1 tablet by mouth 2 times daily 3/6/23

## 2023-03-22 NOTE — ACP (ADVANCE CARE PLANNING)
Advance Care Planning     Advance Care Planning Activator (Inpatient)  Conversation Note      Date of ACP Conversation: 3/22/2023     Conversation Conducted with: Patient with Decision Making Capacity    ACP Activator: Diana Monahan RN    Health Care Decision Maker:     Current Designated Health Care Decision Maker:     Click here to complete Healthcare Decision Makers including section of the Healthcare Decision Maker Relationship (ie \"Primary\")  Today we documented Decision Maker(s) consistent with Legal Next of Kin hierarchy. Care Preferences    Ventilation: \"If you were in your present state of health and suddenly became very ill and were unable to breathe on your own, what would your preference be about the use of a ventilator (breathing machine) if it were available to you? \"      Would the patient desire the use of ventilator (breathing machine)?: yes    \"If your health worsens and it becomes clear that your chance of recovery is unlikely, what would your preference be about the use of a ventilator (breathing machine) if it were available to you? \"     Would the patient desire the use of ventilator (breathing machine)?: Yes      Resuscitation  \"CPR works best to restart the heart when there is a sudden event, like a heart attack, in someone who is otherwise healthy. Unfortunately, CPR does not typically restart the heart for people who have serious health conditions or who are very sick. \"    \"In the event your heart stopped as a result of an underlying serious health condition, would you want attempts to be made to restart your heart (answer \"yes\" for attempt to resuscitate) or would you prefer a natural death (answer \"no\" for do not attempt to resuscitate)? \" yes       [] Yes   [x] No   Educated Patient / Cheryle Bane regarding differences between Advance Directives and portable DNR orders.     Length of ACP Conversation in minutes:      Conversation Outcomes:  ACP discussion completed    Follow-up
Yes    India Eaton RN  Case Management Department  Ph: [phone] Fax: [phone]

## 2023-03-22 NOTE — ED NOTES
Writer to bedside due to desaturations to 85%. Patient states she is on home O2, 3L. NC administered 2L, maintains O2 at 96-97%.      Pete Mayo RN  03/22/23 012

## 2023-03-22 NOTE — ED NOTES
TRANSFER - OUT REPORT:    Verbal report given to Violet Salamanca RN on Edwin Singh  being transferred to [facility] for routine progression of patient care       Report consisted of patient's Situation, Background, Assessment and   Recommendations(SBAR). Information from the following report(s) ED Encounter Summary, ED SBAR, [facility], Recent Results and Event Log was reviewed with the receiving nurse. [place] Assessment: Presents to emergency department  because of falls (Syncope, seizure, or loss of consciousness): No, Age > 79: No, Altered Mental Status, Intoxication with alcohol or substance confusion (Disorientation, impaired judgment, poor safety awaremess, or inability to follow instructions): No, Impaired Mobility: Ambulates or transfers with assistive devices or assistance; Unable to ambulate or transer.: No, Nursing Judgement: No  Lines:   Peripheral IV 03/21/23 Right Wrist (Active)        Opportunity for questions and clarification was provided.       Patient transported with:  Nisha Yi RN  03/22/23 2691

## 2023-03-23 ENCOUNTER — APPOINTMENT (OUTPATIENT)
Dept: NON INVASIVE DIAGNOSTICS | Age: 63
DRG: 313 | End: 2023-03-23
Payer: COMMERCIAL

## 2023-03-23 ENCOUNTER — APPOINTMENT (OUTPATIENT)
Dept: GENERAL RADIOLOGY | Age: 63
DRG: 313 | End: 2023-03-23
Payer: COMMERCIAL

## 2023-03-23 LAB
ABSOLUTE EOS #: 0.4 K/UL (ref 0–0.4)
ABSOLUTE LYMPH #: 0.9 K/UL (ref 1–4.8)
ABSOLUTE MONO #: 0.4 K/UL (ref 0.1–1.3)
ALBUMIN SERPL-MCNC: 3.4 G/DL (ref 3.5–5.2)
ALP SERPL-CCNC: 121 U/L (ref 35–104)
ALT SERPL-CCNC: 7 U/L (ref 5–33)
ANION GAP SERPL CALCULATED.3IONS-SCNC: 12 MMOL/L (ref 9–17)
AST SERPL-CCNC: 12 U/L
BASOPHILS # BLD: 1 % (ref 0–2)
BASOPHILS ABSOLUTE: 0 K/UL (ref 0–0.2)
BILIRUB DIRECT SERPL-MCNC: 0.3 MG/DL
BILIRUB INDIRECT SERPL-MCNC: 0.9 MG/DL (ref 0–1)
BILIRUB SERPL-MCNC: 1.2 MG/DL (ref 0.3–1.2)
BUN SERPL-MCNC: 15 MG/DL (ref 8–23)
CALCIUM SERPL-MCNC: 9.2 MG/DL (ref 8.6–10.4)
CHLORIDE SERPL-SCNC: 100 MMOL/L (ref 98–107)
CO2 SERPL-SCNC: 27 MMOL/L (ref 20–31)
CREAT SERPL-MCNC: 2.43 MG/DL (ref 0.5–0.9)
EKG ATRIAL RATE: 70 BPM
EKG P AXIS: 63 DEGREES
EKG P-R INTERVAL: 152 MS
EKG Q-T INTERVAL: 606 MS
EKG QRS DURATION: 92 MS
EKG QTC CALCULATION (BAZETT): 654 MS
EKG R AXIS: 15 DEGREES
EKG T AXIS: 54 DEGREES
EKG VENTRICULAR RATE: 70 BPM
EOSINOPHILS RELATIVE PERCENT: 6 % (ref 0–4)
GFR SERPL CREATININE-BSD FRML MDRD: 22 ML/MIN/1.73M2
GLUCOSE BLD-MCNC: 167 MG/DL (ref 65–105)
GLUCOSE BLD-MCNC: 212 MG/DL (ref 65–105)
GLUCOSE BLD-MCNC: 265 MG/DL (ref 65–105)
GLUCOSE BLD-MCNC: 268 MG/DL (ref 65–105)
GLUCOSE SERPL-MCNC: 172 MG/DL (ref 70–99)
HCT VFR BLD AUTO: 31.8 % (ref 36–46)
HGB BLD-MCNC: 10.5 G/DL (ref 12–16)
LV EF: 58 %
LVEF MODALITY: NORMAL
LYMPHOCYTES # BLD: 16 % (ref 24–44)
MCH RBC QN AUTO: 32 PG (ref 26–34)
MCHC RBC AUTO-ENTMCNC: 33.1 G/DL (ref 31–37)
MCV RBC AUTO: 96.7 FL (ref 80–100)
MONOCYTES # BLD: 7 % (ref 1–7)
PDW BLD-RTO: 15.8 % (ref 11.5–14.9)
PLATELET # BLD AUTO: 187 K/UL (ref 150–450)
PMV BLD AUTO: 8.3 FL (ref 6–12)
POTASSIUM SERPL-SCNC: 4 MMOL/L (ref 3.7–5.3)
PROT SERPL-MCNC: 6.5 G/DL (ref 6.4–8.3)
RBC # BLD: 3.29 M/UL (ref 4–5.2)
SARS-COV-2 RDRP RESP QL NAA+PROBE: NOT DETECTED
SEG NEUTROPHILS: 70 % (ref 36–66)
SEGMENTED NEUTROPHILS ABSOLUTE COUNT: 4.1 K/UL (ref 1.3–9.1)
SODIUM SERPL-SCNC: 139 MMOL/L (ref 135–144)
SPECIMEN DESCRIPTION: NORMAL
TROPONIN I SERPL DL<=0.01 NG/ML-MCNC: 105 NG/L (ref 0–14)
WBC # BLD AUTO: 5.8 K/UL (ref 3.5–11)

## 2023-03-23 PROCEDURE — 93005 ELECTROCARDIOGRAM TRACING: CPT | Performed by: EMERGENCY MEDICINE

## 2023-03-23 PROCEDURE — 36415 COLL VENOUS BLD VENIPUNCTURE: CPT

## 2023-03-23 PROCEDURE — 99232 SBSQ HOSP IP/OBS MODERATE 35: CPT | Performed by: STUDENT IN AN ORGANIZED HEALTH CARE EDUCATION/TRAINING PROGRAM

## 2023-03-23 PROCEDURE — 93308 TTE F-UP OR LMTD: CPT

## 2023-03-23 PROCEDURE — 71045 X-RAY EXAM CHEST 1 VIEW: CPT

## 2023-03-23 PROCEDURE — 6360000002 HC RX W HCPCS: Performed by: STUDENT IN AN ORGANIZED HEALTH CARE EDUCATION/TRAINING PROGRAM

## 2023-03-23 PROCEDURE — 85025 COMPLETE CBC W/AUTO DIFF WBC: CPT

## 2023-03-23 PROCEDURE — 6360000002 HC RX W HCPCS: Performed by: INTERNAL MEDICINE

## 2023-03-23 PROCEDURE — 84484 ASSAY OF TROPONIN QUANT: CPT

## 2023-03-23 PROCEDURE — 93010 ELECTROCARDIOGRAM REPORT: CPT | Performed by: INTERNAL MEDICINE

## 2023-03-23 PROCEDURE — 97162 PT EVAL MOD COMPLEX 30 MIN: CPT

## 2023-03-23 PROCEDURE — 1200000000 HC SEMI PRIVATE

## 2023-03-23 PROCEDURE — 6370000000 HC RX 637 (ALT 250 FOR IP): Performed by: STUDENT IN AN ORGANIZED HEALTH CARE EDUCATION/TRAINING PROGRAM

## 2023-03-23 PROCEDURE — 2580000003 HC RX 258: Performed by: STUDENT IN AN ORGANIZED HEALTH CARE EDUCATION/TRAINING PROGRAM

## 2023-03-23 PROCEDURE — 97530 THERAPEUTIC ACTIVITIES: CPT

## 2023-03-23 PROCEDURE — 80076 HEPATIC FUNCTION PANEL: CPT

## 2023-03-23 PROCEDURE — 82947 ASSAY GLUCOSE BLOOD QUANT: CPT

## 2023-03-23 PROCEDURE — 97535 SELF CARE MNGMENT TRAINING: CPT

## 2023-03-23 PROCEDURE — 87635 SARS-COV-2 COVID-19 AMP PRB: CPT

## 2023-03-23 PROCEDURE — 80048 BASIC METABOLIC PNL TOTAL CA: CPT

## 2023-03-23 RX ORDER — HEPARIN SODIUM 10000 [USP'U]/100ML
5-30 INJECTION, SOLUTION INTRAVENOUS CONTINUOUS
Status: DISCONTINUED | OUTPATIENT
Start: 2023-03-23 | End: 2023-03-23

## 2023-03-23 RX ORDER — HEPARIN SODIUM 1000 [USP'U]/ML
4000 INJECTION, SOLUTION INTRAVENOUS; SUBCUTANEOUS ONCE
Status: DISCONTINUED | OUTPATIENT
Start: 2023-03-23 | End: 2023-03-23

## 2023-03-23 RX ORDER — HEPARIN SODIUM 1000 [USP'U]/ML
2000 INJECTION, SOLUTION INTRAVENOUS; SUBCUTANEOUS PRN
Status: DISCONTINUED | OUTPATIENT
Start: 2023-03-23 | End: 2023-03-26

## 2023-03-23 RX ORDER — MORPHINE SULFATE 2 MG/ML
2 INJECTION, SOLUTION INTRAMUSCULAR; INTRAVENOUS EVERY 8 HOURS PRN
Status: DISCONTINUED | OUTPATIENT
Start: 2023-03-23 | End: 2023-03-27 | Stop reason: HOSPADM

## 2023-03-23 RX ORDER — FUROSEMIDE 10 MG/ML
20 INJECTION INTRAMUSCULAR; INTRAVENOUS ONCE
Status: COMPLETED | OUTPATIENT
Start: 2023-03-23 | End: 2023-03-23

## 2023-03-23 RX ORDER — HEPARIN SODIUM 1000 [USP'U]/ML
4000 INJECTION, SOLUTION INTRAVENOUS; SUBCUTANEOUS PRN
Status: DISCONTINUED | OUTPATIENT
Start: 2023-03-23 | End: 2023-03-23

## 2023-03-23 RX ADMIN — SODIUM CHLORIDE, PRESERVATIVE FREE 10 ML: 5 INJECTION INTRAVENOUS at 09:48

## 2023-03-23 RX ADMIN — ATORVASTATIN CALCIUM 80 MG: 80 TABLET, FILM COATED ORAL at 21:38

## 2023-03-23 RX ADMIN — HEPARIN SODIUM 5000 UNITS: 5000 INJECTION INTRAVENOUS; SUBCUTANEOUS at 13:12

## 2023-03-23 RX ADMIN — DOCUSATE SODIUM 100 MG: 100 CAPSULE, LIQUID FILLED ORAL at 21:38

## 2023-03-23 RX ADMIN — TAMSULOSIN HYDROCHLORIDE 0.4 MG: 0.4 CAPSULE ORAL at 09:03

## 2023-03-23 RX ADMIN — INSULIN GLARGINE 57 UNITS: 100 INJECTION, SOLUTION SUBCUTANEOUS at 21:46

## 2023-03-23 RX ADMIN — ASPIRIN 81 MG 81 MG: 81 TABLET ORAL at 09:03

## 2023-03-23 RX ADMIN — BUMETANIDE 3 MG: 1 TABLET ORAL at 21:38

## 2023-03-23 RX ADMIN — TICAGRELOR 90 MG: 90 TABLET ORAL at 21:38

## 2023-03-23 RX ADMIN — HEPARIN SODIUM 5000 UNITS: 5000 INJECTION INTRAVENOUS; SUBCUTANEOUS at 21:37

## 2023-03-23 RX ADMIN — BUSPIRONE HYDROCHLORIDE 7.5 MG: 5 TABLET ORAL at 13:12

## 2023-03-23 RX ADMIN — PANTOPRAZOLE SODIUM 40 MG: 40 TABLET, DELAYED RELEASE ORAL at 05:40

## 2023-03-23 RX ADMIN — METHOCARBAMOL 750 MG: 750 TABLET ORAL at 17:23

## 2023-03-23 RX ADMIN — BUSPIRONE HYDROCHLORIDE 7.5 MG: 5 TABLET ORAL at 09:03

## 2023-03-23 RX ADMIN — MORPHINE SULFATE 2 MG: 2 INJECTION, SOLUTION INTRAMUSCULAR; INTRAVENOUS at 16:34

## 2023-03-23 RX ADMIN — LINACLOTIDE 145 MCG: 145 CAPSULE, GELATIN COATED ORAL at 05:40

## 2023-03-23 RX ADMIN — METHOCARBAMOL 750 MG: 750 TABLET ORAL at 09:02

## 2023-03-23 RX ADMIN — BUSPIRONE HYDROCHLORIDE 7.5 MG: 5 TABLET ORAL at 21:39

## 2023-03-23 RX ADMIN — CARVEDILOL 3.12 MG: 3.12 TABLET, FILM COATED ORAL at 09:03

## 2023-03-23 RX ADMIN — FUROSEMIDE 20 MG: 10 INJECTION, SOLUTION INTRAMUSCULAR; INTRAVENOUS at 16:51

## 2023-03-23 RX ADMIN — INSULIN GLARGINE 57 UNITS: 100 INJECTION, SOLUTION SUBCUTANEOUS at 09:04

## 2023-03-23 RX ADMIN — INSULIN LISPRO 2 UNITS: 100 INJECTION, SOLUTION INTRAVENOUS; SUBCUTANEOUS at 16:54

## 2023-03-23 RX ADMIN — METHOCARBAMOL 750 MG: 750 TABLET ORAL at 13:12

## 2023-03-23 RX ADMIN — METHOCARBAMOL 750 MG: 750 TABLET ORAL at 21:41

## 2023-03-23 RX ADMIN — HEPARIN SODIUM 5000 UNITS: 5000 INJECTION INTRAVENOUS; SUBCUTANEOUS at 05:40

## 2023-03-23 RX ADMIN — BUMETANIDE 3 MG: 1 TABLET ORAL at 09:02

## 2023-03-23 RX ADMIN — DOCUSATE SODIUM 100 MG: 100 CAPSULE, LIQUID FILLED ORAL at 09:02

## 2023-03-23 RX ADMIN — TICAGRELOR 90 MG: 90 TABLET ORAL at 09:02

## 2023-03-23 RX ADMIN — INSULIN LISPRO 4 UNITS: 100 INJECTION, SOLUTION INTRAVENOUS; SUBCUTANEOUS at 11:17

## 2023-03-23 RX ADMIN — SODIUM CHLORIDE, PRESERVATIVE FREE 10 ML: 5 INJECTION INTRAVENOUS at 21:39

## 2023-03-23 RX ADMIN — POLYETHYLENE GLYCOL 3350 17 G: 17 POWDER, FOR SOLUTION ORAL at 09:04

## 2023-03-23 ASSESSMENT — ENCOUNTER SYMPTOMS
DIARRHEA: 0
SHORTNESS OF BREATH: 0
VOMITING: 0
ABDOMINAL PAIN: 0
RHINORRHEA: 0
COUGH: 0
WHEEZING: 0
EYES NEGATIVE: 1
NAUSEA: 0
SORE THROAT: 0

## 2023-03-23 ASSESSMENT — PAIN DESCRIPTION - ORIENTATION: ORIENTATION: MID

## 2023-03-23 ASSESSMENT — PAIN DESCRIPTION - DESCRIPTORS: DESCRIPTORS: PRESSURE

## 2023-03-23 ASSESSMENT — PAIN SCALES - GENERAL
PAINLEVEL_OUTOF10: 2
PAINLEVEL_OUTOF10: 0

## 2023-03-23 ASSESSMENT — PAIN DESCRIPTION - LOCATION: LOCATION: CHEST

## 2023-03-23 NOTE — CARE COORDINATION
BONNIE spoke with [facility] of [address] [place] [address], authorization can not be started until PT notes are in the chart. BONNIE will notify facility once PT notes are inputted in the chart. Electronically signed by AZ Shahid on 3/23/2023 at 10:37 [facility] that updated PT note has been inputted in chart.      Electronically signed by AZ Shahid on 3/23/2023 at 1:00 PM

## 2023-03-23 NOTE — CARE COORDINATION
Confirmed with Philipp at [facility], and authorization was submitted 3/23. SW informed patient that we are waiting on authorization to be approved.     Electronically signed by AZ Rodríguez on 3/23/2023 at 4:05 PM

## 2023-03-24 LAB
ABSOLUTE EOS #: 0.3 K/UL (ref 0–0.4)
ABSOLUTE LYMPH #: 0.9 K/UL (ref 1–4.8)
ABSOLUTE MONO #: 0.4 K/UL (ref 0.1–1.3)
ANION GAP SERPL CALCULATED.3IONS-SCNC: 11 MMOL/L (ref 9–17)
BASOPHILS # BLD: 1 % (ref 0–2)
BASOPHILS ABSOLUTE: 0 K/UL (ref 0–0.2)
BUN SERPL-MCNC: 26 MG/DL (ref 8–23)
CALCIUM SERPL-MCNC: 8.6 MG/DL (ref 8.6–10.4)
CHLORIDE SERPL-SCNC: 102 MMOL/L (ref 98–107)
CO2 SERPL-SCNC: 29 MMOL/L (ref 20–31)
CREAT SERPL-MCNC: 3.26 MG/DL (ref 0.5–0.9)
EOSINOPHILS RELATIVE PERCENT: 5 % (ref 0–4)
GFR SERPL CREATININE-BSD FRML MDRD: 15 ML/MIN/1.73M2
GLUCOSE BLD-MCNC: 183 MG/DL (ref 65–105)
GLUCOSE BLD-MCNC: 183 MG/DL (ref 65–105)
GLUCOSE BLD-MCNC: 218 MG/DL (ref 65–105)
GLUCOSE BLD-MCNC: 288 MG/DL (ref 65–105)
GLUCOSE SERPL-MCNC: 205 MG/DL (ref 70–99)
HCT VFR BLD AUTO: 28.7 % (ref 36–46)
HGB BLD-MCNC: 9.8 G/DL (ref 12–16)
LYMPHOCYTES # BLD: 17 % (ref 24–44)
MCH RBC QN AUTO: 32.5 PG (ref 26–34)
MCHC RBC AUTO-ENTMCNC: 34.2 G/DL (ref 31–37)
MCV RBC AUTO: 95.1 FL (ref 80–100)
MONOCYTES # BLD: 8 % (ref 1–7)
PDW BLD-RTO: 16.1 % (ref 11.5–14.9)
PLATELET # BLD AUTO: 141 K/UL (ref 150–450)
PMV BLD AUTO: 8.3 FL (ref 6–12)
POTASSIUM SERPL-SCNC: 4.1 MMOL/L (ref 3.7–5.3)
RBC # BLD: 3.01 M/UL (ref 4–5.2)
SEG NEUTROPHILS: 69 % (ref 36–66)
SEGMENTED NEUTROPHILS ABSOLUTE COUNT: 3.6 K/UL (ref 1.3–9.1)
SODIUM SERPL-SCNC: 142 MMOL/L (ref 135–144)
WBC # BLD AUTO: 5.3 K/UL (ref 3.5–11)

## 2023-03-24 PROCEDURE — 2500000003 HC RX 250 WO HCPCS: Performed by: INTERNAL MEDICINE

## 2023-03-24 PROCEDURE — 82947 ASSAY GLUCOSE BLOOD QUANT: CPT

## 2023-03-24 PROCEDURE — 6360000002 HC RX W HCPCS: Performed by: STUDENT IN AN ORGANIZED HEALTH CARE EDUCATION/TRAINING PROGRAM

## 2023-03-24 PROCEDURE — 2580000003 HC RX 258: Performed by: STUDENT IN AN ORGANIZED HEALTH CARE EDUCATION/TRAINING PROGRAM

## 2023-03-24 PROCEDURE — 6370000000 HC RX 637 (ALT 250 FOR IP): Performed by: STUDENT IN AN ORGANIZED HEALTH CARE EDUCATION/TRAINING PROGRAM

## 2023-03-24 PROCEDURE — 94761 N-INVAS EAR/PLS OXIMETRY MLT: CPT

## 2023-03-24 PROCEDURE — 1200000000 HC SEMI PRIVATE

## 2023-03-24 PROCEDURE — 90935 HEMODIALYSIS ONE EVALUATION: CPT

## 2023-03-24 PROCEDURE — 36415 COLL VENOUS BLD VENIPUNCTURE: CPT

## 2023-03-24 PROCEDURE — 80048 BASIC METABOLIC PNL TOTAL CA: CPT

## 2023-03-24 PROCEDURE — 99232 SBSQ HOSP IP/OBS MODERATE 35: CPT | Performed by: STUDENT IN AN ORGANIZED HEALTH CARE EDUCATION/TRAINING PROGRAM

## 2023-03-24 PROCEDURE — 93005 ELECTROCARDIOGRAM TRACING: CPT | Performed by: STUDENT IN AN ORGANIZED HEALTH CARE EDUCATION/TRAINING PROGRAM

## 2023-03-24 PROCEDURE — 85025 COMPLETE CBC W/AUTO DIFF WBC: CPT

## 2023-03-24 PROCEDURE — 94640 AIRWAY INHALATION TREATMENT: CPT

## 2023-03-24 PROCEDURE — 2700000000 HC OXYGEN THERAPY PER DAY

## 2023-03-24 RX ORDER — ALBUTEROL SULFATE 2.5 MG/3ML
2.5 SOLUTION RESPIRATORY (INHALATION) EVERY 6 HOURS PRN
Status: DISCONTINUED | OUTPATIENT
Start: 2023-03-24 | End: 2023-03-27 | Stop reason: HOSPADM

## 2023-03-24 RX ORDER — VENLAFAXINE HYDROCHLORIDE 37.5 MG/1
37.5 CAPSULE, EXTENDED RELEASE ORAL
Status: DISCONTINUED | OUTPATIENT
Start: 2023-03-24 | End: 2023-03-27 | Stop reason: HOSPADM

## 2023-03-24 RX ADMIN — HEPARIN SODIUM 5000 UNITS: 5000 INJECTION INTRAVENOUS; SUBCUTANEOUS at 05:46

## 2023-03-24 RX ADMIN — ALBUTEROL SULFATE 2.5 MG: 2.5 SOLUTION RESPIRATORY (INHALATION) at 15:12

## 2023-03-24 RX ADMIN — TICAGRELOR 90 MG: 90 TABLET ORAL at 21:16

## 2023-03-24 RX ADMIN — ATORVASTATIN CALCIUM 80 MG: 80 TABLET, FILM COATED ORAL at 21:15

## 2023-03-24 RX ADMIN — INSULIN GLARGINE 57 UNITS: 100 INJECTION, SOLUTION SUBCUTANEOUS at 07:50

## 2023-03-24 RX ADMIN — DOCUSATE SODIUM 100 MG: 100 CAPSULE, LIQUID FILLED ORAL at 07:44

## 2023-03-24 RX ADMIN — HEPARIN SODIUM 5000 UNITS: 5000 INJECTION INTRAVENOUS; SUBCUTANEOUS at 13:28

## 2023-03-24 RX ADMIN — BUMETANIDE 3 MG: 1 TABLET ORAL at 21:15

## 2023-03-24 RX ADMIN — METHOCARBAMOL 750 MG: 750 TABLET ORAL at 07:45

## 2023-03-24 RX ADMIN — METHOCARBAMOL 750 MG: 750 TABLET ORAL at 13:29

## 2023-03-24 RX ADMIN — TAMSULOSIN HYDROCHLORIDE 0.4 MG: 0.4 CAPSULE ORAL at 07:44

## 2023-03-24 RX ADMIN — DOCUSATE SODIUM 100 MG: 100 CAPSULE, LIQUID FILLED ORAL at 21:15

## 2023-03-24 RX ADMIN — BUSPIRONE HYDROCHLORIDE 7.5 MG: 5 TABLET ORAL at 21:16

## 2023-03-24 RX ADMIN — METHOCARBAMOL 750 MG: 750 TABLET ORAL at 21:17

## 2023-03-24 RX ADMIN — SODIUM CHLORIDE, PRESERVATIVE FREE 10 ML: 5 INJECTION INTRAVENOUS at 07:45

## 2023-03-24 RX ADMIN — PANTOPRAZOLE SODIUM 40 MG: 40 TABLET, DELAYED RELEASE ORAL at 05:46

## 2023-03-24 RX ADMIN — ASPIRIN 81 MG 81 MG: 81 TABLET ORAL at 07:43

## 2023-03-24 RX ADMIN — BUSPIRONE HYDROCHLORIDE 7.5 MG: 5 TABLET ORAL at 13:28

## 2023-03-24 RX ADMIN — TICAGRELOR 90 MG: 90 TABLET ORAL at 07:50

## 2023-03-24 RX ADMIN — INSULIN GLARGINE 57 UNITS: 100 INJECTION, SOLUTION SUBCUTANEOUS at 21:21

## 2023-03-24 RX ADMIN — HEPARIN SODIUM 5000 UNITS: 5000 INJECTION INTRAVENOUS; SUBCUTANEOUS at 21:16

## 2023-03-24 RX ADMIN — LINACLOTIDE 145 MCG: 145 CAPSULE, GELATIN COATED ORAL at 05:46

## 2023-03-24 RX ADMIN — Medication 1.6 ML: at 11:41

## 2023-03-24 RX ADMIN — METHOCARBAMOL 750 MG: 750 TABLET ORAL at 17:36

## 2023-03-24 RX ADMIN — CARVEDILOL 3.12 MG: 3.12 TABLET, FILM COATED ORAL at 07:44

## 2023-03-24 RX ADMIN — INSULIN LISPRO 4 UNITS: 100 INJECTION, SOLUTION INTRAVENOUS; SUBCUTANEOUS at 17:34

## 2023-03-24 RX ADMIN — BUSPIRONE HYDROCHLORIDE 7.5 MG: 5 TABLET ORAL at 07:44

## 2023-03-24 RX ADMIN — BUMETANIDE 3 MG: 1 TABLET ORAL at 07:43

## 2023-03-24 RX ADMIN — SODIUM CHLORIDE, PRESERVATIVE FREE 10 ML: 5 INJECTION INTRAVENOUS at 21:24

## 2023-03-24 RX ADMIN — POLYETHYLENE GLYCOL 3350 17 G: 17 POWDER, FOR SOLUTION ORAL at 07:58

## 2023-03-24 ASSESSMENT — ENCOUNTER SYMPTOMS
SHORTNESS OF BREATH: 0
SORE THROAT: 0
VOMITING: 0
EYES NEGATIVE: 1
RHINORRHEA: 0
WHEEZING: 0
ABDOMINAL PAIN: 0
NAUSEA: 0
COUGH: 0
DIARRHEA: 0

## 2023-03-24 ASSESSMENT — PAIN SCALES - GENERAL: PAINLEVEL_OUTOF10: 0

## 2023-03-24 NOTE — DISCHARGE INSTR - COC
EDT    CASE MANAGEMENT/SOCIAL WORK SECTION    Inpatient Status Date: 3/22/2023    Readmission Risk Assessment Score:  Readmission Risk              Risk of Unplanned Readmission:  54           Discharging to Facility/ Agency   [facility]. Phone: [phone]  Fax: [phone]     Dialysis Facility (if applicable)   [facility]  [facility],[address]  P: [phone]  F: [phone]   MWF @ 1015A    / signature: Electronically signed by Rudy Yung RN on 3/24/23 at 3:57 PM EDT    PHYSICIAN SECTION    Prognosis: Guarded    Condition at Discharge: Stable    Rehab Potential (if transferring to Rehab): Guarded    Recommended Labs or Other Treatments After Discharge: Dialysis    Physician Certification: I certify the above information and transfer of Cornelius Fuentes  is necessary for the continuing treatment of the diagnosis listed and that she requires [facility] for greater 30 days.      Update Admission H&P: No change in H&P    PHYSICIAN SIGNATURE:  Electronically signed by Domonique Lyons MD on 3/24/23 at 11:52 AM EDT

## 2023-03-24 NOTE — CARE COORDINATION
ONGOING DISCHARGE PLAN:    Patient is alert and oriented x4. Spoke with patient regarding discharge plan and patient confirms that plan is still [address]. I spoke with Zach Rutledge at [facility] and they did not get authorization yet. He states all documents were submitted on 3/23. Uses [facility] for transport    Echo completed  Lantus increased  3/24 hemodialysis, 2 KGs removed    Will continue to follow for additional discharge needs. If patient is discharged prior to next notation, then this note serves as note for discharge by case management.     Electronically signed by Elsy Oliver RN on 3/24/2023 at 3:58 PM

## 2023-03-24 NOTE — DISCHARGE INSTR - DIET

## 2023-03-25 LAB
ABSOLUTE EOS #: 0.2 K/UL (ref 0–0.4)
ABSOLUTE LYMPH #: 0.9 K/UL (ref 1–4.8)
ABSOLUTE MONO #: 0.4 K/UL (ref 0.1–1.3)
ANION GAP SERPL CALCULATED.3IONS-SCNC: 11 MMOL/L (ref 9–17)
BASOPHILS # BLD: 1 % (ref 0–2)
BASOPHILS ABSOLUTE: 0 K/UL (ref 0–0.2)
BUN SERPL-MCNC: 17 MG/DL (ref 8–23)
CALCIUM SERPL-MCNC: 9 MG/DL (ref 8.6–10.4)
CHLORIDE SERPL-SCNC: 100 MMOL/L (ref 98–107)
CO2 SERPL-SCNC: 27 MMOL/L (ref 20–31)
CREAT SERPL-MCNC: 2.52 MG/DL (ref 0.5–0.9)
EOSINOPHILS RELATIVE PERCENT: 5 % (ref 0–4)
GFR SERPL CREATININE-BSD FRML MDRD: 21 ML/MIN/1.73M2
GLUCOSE BLD-MCNC: 159 MG/DL (ref 65–105)
GLUCOSE BLD-MCNC: 262 MG/DL (ref 65–105)
GLUCOSE BLD-MCNC: 272 MG/DL (ref 65–105)
GLUCOSE BLD-MCNC: 351 MG/DL (ref 65–105)
GLUCOSE SERPL-MCNC: 186 MG/DL (ref 70–99)
HCT VFR BLD AUTO: 31.3 % (ref 36–46)
HGB BLD-MCNC: 10.5 G/DL (ref 12–16)
LYMPHOCYTES # BLD: 19 % (ref 24–44)
MCH RBC QN AUTO: 32.3 PG (ref 26–34)
MCHC RBC AUTO-ENTMCNC: 33.5 G/DL (ref 31–37)
MCV RBC AUTO: 96.4 FL (ref 80–100)
MONOCYTES # BLD: 9 % (ref 1–7)
PDW BLD-RTO: 16 % (ref 11.5–14.9)
PLATELET # BLD AUTO: 153 K/UL (ref 150–450)
PMV BLD AUTO: 8.6 FL (ref 6–12)
POTASSIUM SERPL-SCNC: 3.7 MMOL/L (ref 3.7–5.3)
RBC # BLD: 3.25 M/UL (ref 4–5.2)
SEG NEUTROPHILS: 66 % (ref 36–66)
SEGMENTED NEUTROPHILS ABSOLUTE COUNT: 3.4 K/UL (ref 1.3–9.1)
SODIUM SERPL-SCNC: 138 MMOL/L (ref 135–144)
WBC # BLD AUTO: 5 K/UL (ref 3.5–11)

## 2023-03-25 PROCEDURE — 6360000002 HC RX W HCPCS: Performed by: STUDENT IN AN ORGANIZED HEALTH CARE EDUCATION/TRAINING PROGRAM

## 2023-03-25 PROCEDURE — 1200000000 HC SEMI PRIVATE

## 2023-03-25 PROCEDURE — 85025 COMPLETE CBC W/AUTO DIFF WBC: CPT

## 2023-03-25 PROCEDURE — 99231 SBSQ HOSP IP/OBS SF/LOW 25: CPT | Performed by: STUDENT IN AN ORGANIZED HEALTH CARE EDUCATION/TRAINING PROGRAM

## 2023-03-25 PROCEDURE — 6370000000 HC RX 637 (ALT 250 FOR IP): Performed by: STUDENT IN AN ORGANIZED HEALTH CARE EDUCATION/TRAINING PROGRAM

## 2023-03-25 PROCEDURE — 80048 BASIC METABOLIC PNL TOTAL CA: CPT

## 2023-03-25 PROCEDURE — 2580000003 HC RX 258: Performed by: STUDENT IN AN ORGANIZED HEALTH CARE EDUCATION/TRAINING PROGRAM

## 2023-03-25 PROCEDURE — 36415 COLL VENOUS BLD VENIPUNCTURE: CPT

## 2023-03-25 PROCEDURE — 82947 ASSAY GLUCOSE BLOOD QUANT: CPT

## 2023-03-25 RX ORDER — METHOCARBAMOL 750 MG/1
750 TABLET, FILM COATED ORAL 3 TIMES DAILY PRN
Qty: 90 TABLET | Refills: 0 | Status: SHIPPED | OUTPATIENT
Start: 2023-03-25 | End: 2023-03-27 | Stop reason: SDUPTHER

## 2023-03-25 RX ORDER — VENLAFAXINE HYDROCHLORIDE 37.5 MG/1
37.5 CAPSULE, EXTENDED RELEASE ORAL
Qty: 30 CAPSULE | Refills: 0 | Status: SHIPPED | OUTPATIENT
Start: 2023-03-26 | End: 2023-04-08 | Stop reason: SDUPTHER

## 2023-03-25 RX ADMIN — BUMETANIDE 3 MG: 1 TABLET ORAL at 08:01

## 2023-03-25 RX ADMIN — BUSPIRONE HYDROCHLORIDE 7.5 MG: 5 TABLET ORAL at 07:59

## 2023-03-25 RX ADMIN — HEPARIN SODIUM 5000 UNITS: 5000 INJECTION INTRAVENOUS; SUBCUTANEOUS at 13:29

## 2023-03-25 RX ADMIN — HEPARIN SODIUM 5000 UNITS: 5000 INJECTION INTRAVENOUS; SUBCUTANEOUS at 05:59

## 2023-03-25 RX ADMIN — ACETAMINOPHEN 650 MG: 325 TABLET ORAL at 08:01

## 2023-03-25 RX ADMIN — TAMSULOSIN HYDROCHLORIDE 0.4 MG: 0.4 CAPSULE ORAL at 08:01

## 2023-03-25 RX ADMIN — DOCUSATE SODIUM 100 MG: 100 CAPSULE, LIQUID FILLED ORAL at 08:03

## 2023-03-25 RX ADMIN — INSULIN GLARGINE 57 UNITS: 100 INJECTION, SOLUTION SUBCUTANEOUS at 08:04

## 2023-03-25 RX ADMIN — PANTOPRAZOLE SODIUM 40 MG: 40 TABLET, DELAYED RELEASE ORAL at 05:59

## 2023-03-25 RX ADMIN — METHOCARBAMOL 750 MG: 750 TABLET ORAL at 08:01

## 2023-03-25 RX ADMIN — TICAGRELOR 90 MG: 90 TABLET ORAL at 20:33

## 2023-03-25 RX ADMIN — ATORVASTATIN CALCIUM 80 MG: 80 TABLET, FILM COATED ORAL at 20:31

## 2023-03-25 RX ADMIN — SODIUM CHLORIDE, PRESERVATIVE FREE 10 ML: 5 INJECTION INTRAVENOUS at 22:20

## 2023-03-25 RX ADMIN — BUSPIRONE HYDROCHLORIDE 7.5 MG: 5 TABLET ORAL at 20:31

## 2023-03-25 RX ADMIN — MORPHINE SULFATE 2 MG: 2 INJECTION, SOLUTION INTRAMUSCULAR; INTRAVENOUS at 09:57

## 2023-03-25 RX ADMIN — ASPIRIN 81 MG 81 MG: 81 TABLET ORAL at 08:01

## 2023-03-25 RX ADMIN — HEPARIN SODIUM 5000 UNITS: 5000 INJECTION INTRAVENOUS; SUBCUTANEOUS at 20:30

## 2023-03-25 RX ADMIN — INSULIN GLARGINE 57 UNITS: 100 INJECTION, SOLUTION SUBCUTANEOUS at 20:47

## 2023-03-25 RX ADMIN — INSULIN LISPRO 8 UNITS: 100 INJECTION, SOLUTION INTRAVENOUS; SUBCUTANEOUS at 11:27

## 2023-03-25 RX ADMIN — TICAGRELOR 90 MG: 90 TABLET ORAL at 08:01

## 2023-03-25 RX ADMIN — DOCUSATE SODIUM 100 MG: 100 CAPSULE, LIQUID FILLED ORAL at 20:33

## 2023-03-25 RX ADMIN — INSULIN LISPRO 2 UNITS: 100 INJECTION, SOLUTION INTRAVENOUS; SUBCUTANEOUS at 17:01

## 2023-03-25 RX ADMIN — SODIUM CHLORIDE, PRESERVATIVE FREE 10 ML: 5 INJECTION INTRAVENOUS at 08:03

## 2023-03-25 RX ADMIN — METHOCARBAMOL 750 MG: 750 TABLET ORAL at 12:10

## 2023-03-25 RX ADMIN — BUSPIRONE HYDROCHLORIDE 7.5 MG: 5 TABLET ORAL at 13:29

## 2023-03-25 RX ADMIN — VENLAFAXINE HYDROCHLORIDE 37.5 MG: 37.5 CAPSULE, EXTENDED RELEASE ORAL at 09:15

## 2023-03-25 RX ADMIN — METHOCARBAMOL 750 MG: 750 TABLET ORAL at 20:31

## 2023-03-25 RX ADMIN — LINACLOTIDE 145 MCG: 145 CAPSULE, GELATIN COATED ORAL at 06:06

## 2023-03-25 ASSESSMENT — ENCOUNTER SYMPTOMS
RHINORRHEA: 0
EYES NEGATIVE: 1
SORE THROAT: 0
NAUSEA: 0
COUGH: 0
DIARRHEA: 0
VOMITING: 0
WHEEZING: 0
ABDOMINAL PAIN: 0
SHORTNESS OF BREATH: 0

## 2023-03-25 ASSESSMENT — PAIN DESCRIPTION - DESCRIPTORS
DESCRIPTORS: THROBBING
DESCRIPTORS: THROBBING

## 2023-03-25 ASSESSMENT — PAIN DESCRIPTION - FREQUENCY: FREQUENCY: CONTINUOUS

## 2023-03-25 ASSESSMENT — PAIN SCALES - GENERAL
PAINLEVEL_OUTOF10: 4
PAINLEVEL_OUTOF10: 7
PAINLEVEL_OUTOF10: 5
PAINLEVEL_OUTOF10: 4

## 2023-03-25 ASSESSMENT — PAIN DESCRIPTION - ORIENTATION
ORIENTATION: RIGHT;LEFT
ORIENTATION: RIGHT;LEFT
ORIENTATION: MID;RIGHT

## 2023-03-25 ASSESSMENT — PAIN DESCRIPTION - LOCATION
LOCATION: BACK
LOCATION: BACK
LOCATION: FOOT
LOCATION: FOOT

## 2023-03-25 NOTE — CARE COORDINATION
ONGOING DISCHARGE  NOTE:      Writer reviewed notes, Patient is agreeable to discharge to [facility] of [address] [place] [address]. Phone: [phone]  Fax: [phone]      Discharge is pending pre cert. Pre cert has been started. Authorization started on 3/23    Will continue to follow for additional discharge needs. If patient is discharged prior to next notation, then this note serves as note for discharge by case management.     Electronically signed by Latonya Gatica RN on 3/25/2023 at 2:19 PM

## 2023-03-26 LAB
ABSOLUTE EOS #: 0.2 K/UL (ref 0–0.4)
ABSOLUTE LYMPH #: 0.9 K/UL (ref 1–4.8)
ABSOLUTE MONO #: 0.4 K/UL (ref 0.1–1.3)
ANION GAP SERPL CALCULATED.3IONS-SCNC: 12 MMOL/L (ref 9–17)
BASOPHILS # BLD: 1 % (ref 0–2)
BASOPHILS ABSOLUTE: 0 K/UL (ref 0–0.2)
BUN SERPL-MCNC: 28 MG/DL (ref 8–23)
CALCIUM SERPL-MCNC: 8.7 MG/DL (ref 8.6–10.4)
CHLORIDE SERPL-SCNC: 101 MMOL/L (ref 98–107)
CO2 SERPL-SCNC: 25 MMOL/L (ref 20–31)
CREAT SERPL-MCNC: 2.85 MG/DL (ref 0.5–0.9)
EOSINOPHILS RELATIVE PERCENT: 5 % (ref 0–4)
GFR SERPL CREATININE-BSD FRML MDRD: 18 ML/MIN/1.73M2
GLUCOSE BLD-MCNC: 191 MG/DL (ref 65–105)
GLUCOSE BLD-MCNC: 212 MG/DL (ref 65–105)
GLUCOSE BLD-MCNC: 217 MG/DL (ref 65–105)
GLUCOSE BLD-MCNC: 260 MG/DL (ref 65–105)
GLUCOSE SERPL-MCNC: 253 MG/DL (ref 70–99)
HCT VFR BLD AUTO: 30.3 % (ref 36–46)
HGB BLD-MCNC: 10.5 G/DL (ref 12–16)
LYMPHOCYTES # BLD: 20 % (ref 24–44)
MCH RBC QN AUTO: 32.9 PG (ref 26–34)
MCHC RBC AUTO-ENTMCNC: 34.6 G/DL (ref 31–37)
MCV RBC AUTO: 95.1 FL (ref 80–100)
MONOCYTES # BLD: 10 % (ref 1–7)
PDW BLD-RTO: 15.8 % (ref 11.5–14.9)
PLATELET # BLD AUTO: 147 K/UL (ref 150–450)
PMV BLD AUTO: 8.6 FL (ref 6–12)
POTASSIUM SERPL-SCNC: 4 MMOL/L (ref 3.7–5.3)
RBC # BLD: 3.19 M/UL (ref 4–5.2)
SEG NEUTROPHILS: 64 % (ref 36–66)
SEGMENTED NEUTROPHILS ABSOLUTE COUNT: 2.9 K/UL (ref 1.3–9.1)
SODIUM SERPL-SCNC: 138 MMOL/L (ref 135–144)
WBC # BLD AUTO: 4.5 K/UL (ref 3.5–11)

## 2023-03-26 PROCEDURE — 82947 ASSAY GLUCOSE BLOOD QUANT: CPT

## 2023-03-26 PROCEDURE — 99231 SBSQ HOSP IP/OBS SF/LOW 25: CPT | Performed by: STUDENT IN AN ORGANIZED HEALTH CARE EDUCATION/TRAINING PROGRAM

## 2023-03-26 PROCEDURE — 6370000000 HC RX 637 (ALT 250 FOR IP): Performed by: STUDENT IN AN ORGANIZED HEALTH CARE EDUCATION/TRAINING PROGRAM

## 2023-03-26 PROCEDURE — 36415 COLL VENOUS BLD VENIPUNCTURE: CPT

## 2023-03-26 PROCEDURE — 85025 COMPLETE CBC W/AUTO DIFF WBC: CPT

## 2023-03-26 PROCEDURE — 80048 BASIC METABOLIC PNL TOTAL CA: CPT

## 2023-03-26 PROCEDURE — 6360000002 HC RX W HCPCS: Performed by: STUDENT IN AN ORGANIZED HEALTH CARE EDUCATION/TRAINING PROGRAM

## 2023-03-26 PROCEDURE — 2580000003 HC RX 258: Performed by: STUDENT IN AN ORGANIZED HEALTH CARE EDUCATION/TRAINING PROGRAM

## 2023-03-26 PROCEDURE — 1200000000 HC SEMI PRIVATE

## 2023-03-26 RX ORDER — INSULIN GLARGINE 100 [IU]/ML
58 INJECTION, SOLUTION SUBCUTANEOUS 2 TIMES DAILY
Status: DISCONTINUED | OUTPATIENT
Start: 2023-03-26 | End: 2023-03-27 | Stop reason: HOSPADM

## 2023-03-26 RX ORDER — INSULIN GLARGINE 100 [IU]/ML
58 INJECTION, SOLUTION SUBCUTANEOUS 2 TIMES DAILY
Qty: 10 ML | Refills: 3
Start: 2023-03-26 | End: 2023-04-08 | Stop reason: SDUPTHER

## 2023-03-26 RX ADMIN — SODIUM CHLORIDE, PRESERVATIVE FREE 10 ML: 5 INJECTION INTRAVENOUS at 20:49

## 2023-03-26 RX ADMIN — HEPARIN SODIUM 5000 UNITS: 5000 INJECTION INTRAVENOUS; SUBCUTANEOUS at 05:50

## 2023-03-26 RX ADMIN — INSULIN LISPRO 2 UNITS: 100 INJECTION, SOLUTION INTRAVENOUS; SUBCUTANEOUS at 09:18

## 2023-03-26 RX ADMIN — TAMSULOSIN HYDROCHLORIDE 0.4 MG: 0.4 CAPSULE ORAL at 09:12

## 2023-03-26 RX ADMIN — LINACLOTIDE 145 MCG: 145 CAPSULE, GELATIN COATED ORAL at 05:51

## 2023-03-26 RX ADMIN — BUSPIRONE HYDROCHLORIDE 7.5 MG: 5 TABLET ORAL at 09:12

## 2023-03-26 RX ADMIN — METHOCARBAMOL 750 MG: 750 TABLET ORAL at 14:05

## 2023-03-26 RX ADMIN — CARVEDILOL 3.12 MG: 3.12 TABLET, FILM COATED ORAL at 09:12

## 2023-03-26 RX ADMIN — HEPARIN SODIUM 5000 UNITS: 5000 INJECTION INTRAVENOUS; SUBCUTANEOUS at 14:27

## 2023-03-26 RX ADMIN — BUMETANIDE 3 MG: 1 TABLET ORAL at 09:05

## 2023-03-26 RX ADMIN — INSULIN GLARGINE 58 UNITS: 100 INJECTION, SOLUTION SUBCUTANEOUS at 20:42

## 2023-03-26 RX ADMIN — HEPARIN SODIUM 5000 UNITS: 5000 INJECTION INTRAVENOUS; SUBCUTANEOUS at 22:02

## 2023-03-26 RX ADMIN — ASPIRIN 81 MG 81 MG: 81 TABLET ORAL at 09:05

## 2023-03-26 RX ADMIN — BUSPIRONE HYDROCHLORIDE 7.5 MG: 5 TABLET ORAL at 14:27

## 2023-03-26 RX ADMIN — SODIUM CHLORIDE, PRESERVATIVE FREE 10 ML: 5 INJECTION INTRAVENOUS at 09:17

## 2023-03-26 RX ADMIN — ATORVASTATIN CALCIUM 80 MG: 80 TABLET, FILM COATED ORAL at 20:42

## 2023-03-26 RX ADMIN — VENLAFAXINE HYDROCHLORIDE 37.5 MG: 37.5 CAPSULE, EXTENDED RELEASE ORAL at 09:13

## 2023-03-26 RX ADMIN — INSULIN LISPRO 2 UNITS: 100 INJECTION, SOLUTION INTRAVENOUS; SUBCUTANEOUS at 17:15

## 2023-03-26 RX ADMIN — MORPHINE SULFATE 2 MG: 2 INJECTION, SOLUTION INTRAMUSCULAR; INTRAVENOUS at 20:50

## 2023-03-26 RX ADMIN — INSULIN LISPRO 4 UNITS: 100 INJECTION, SOLUTION INTRAVENOUS; SUBCUTANEOUS at 12:53

## 2023-03-26 RX ADMIN — TICAGRELOR 90 MG: 90 TABLET ORAL at 20:42

## 2023-03-26 RX ADMIN — DOCUSATE SODIUM 100 MG: 100 CAPSULE, LIQUID FILLED ORAL at 09:05

## 2023-03-26 RX ADMIN — PANTOPRAZOLE SODIUM 40 MG: 40 TABLET, DELAYED RELEASE ORAL at 05:51

## 2023-03-26 RX ADMIN — METHOCARBAMOL 750 MG: 750 TABLET ORAL at 09:13

## 2023-03-26 RX ADMIN — METHOCARBAMOL 750 MG: 750 TABLET ORAL at 20:43

## 2023-03-26 RX ADMIN — MORPHINE SULFATE 2 MG: 2 INJECTION, SOLUTION INTRAMUSCULAR; INTRAVENOUS at 12:59

## 2023-03-26 RX ADMIN — METHOCARBAMOL 750 MG: 750 TABLET ORAL at 17:16

## 2023-03-26 RX ADMIN — BUMETANIDE 3 MG: 1 TABLET ORAL at 17:16

## 2023-03-26 RX ADMIN — TICAGRELOR 90 MG: 90 TABLET ORAL at 09:05

## 2023-03-26 RX ADMIN — BUSPIRONE HYDROCHLORIDE 7.5 MG: 5 TABLET ORAL at 20:42

## 2023-03-26 RX ADMIN — DOCUSATE SODIUM 100 MG: 100 CAPSULE, LIQUID FILLED ORAL at 20:42

## 2023-03-26 RX ADMIN — CARVEDILOL 3.12 MG: 3.12 TABLET, FILM COATED ORAL at 20:42

## 2023-03-26 ASSESSMENT — ENCOUNTER SYMPTOMS
ABDOMINAL PAIN: 0
EYES NEGATIVE: 1
DIARRHEA: 0
NAUSEA: 0
SHORTNESS OF BREATH: 0
VOMITING: 0

## 2023-03-26 ASSESSMENT — PAIN SCALES - GENERAL
PAINLEVEL_OUTOF10: 8
PAINLEVEL_OUTOF10: 0
PAINLEVEL_OUTOF10: 2
PAINLEVEL_OUTOF10: 5

## 2023-03-26 ASSESSMENT — PAIN DESCRIPTION - DESCRIPTORS
DESCRIPTORS: SPASM
DESCRIPTORS: ACHING

## 2023-03-26 ASSESSMENT — PAIN DESCRIPTION - LOCATION
LOCATION: BACK
LOCATION: BACK

## 2023-03-26 ASSESSMENT — PAIN DESCRIPTION - ORIENTATION: ORIENTATION: LEFT

## 2023-03-26 NOTE — CARE COORDINATION
ONGOING DISCHARGE PLAN:    Patient is alert and oriented x4. Writer reviewed notes, Patient is agreeable to discharge to The [address] [place] [address]. Phone: [phone]  Fax: [phone]        Discharge is pending pre cert. Pre cert has been started. Authorization started on 3/23     Will continue to follow for additional discharge needs. If patient is discharged prior to next notation, then this note serves as note for discharge by case management.       Electronically signed by Wong Cherry RN on 3/26/2023 at 12:11 PM

## 2023-03-27 VITALS
HEIGHT: 65 IN | TEMPERATURE: 98 F | WEIGHT: 216.93 LBS | OXYGEN SATURATION: 96 % | HEART RATE: 58 BPM | RESPIRATION RATE: 16 BRPM | DIASTOLIC BLOOD PRESSURE: 59 MMHG | SYSTOLIC BLOOD PRESSURE: 151 MMHG | BODY MASS INDEX: 36.14 KG/M2

## 2023-03-27 LAB
ANION GAP SERPL CALCULATED.3IONS-SCNC: 15 MMOL/L (ref 9–17)
BUN SERPL-MCNC: 32 MG/DL (ref 8–23)
CALCIUM SERPL-MCNC: 9.2 MG/DL (ref 8.6–10.4)
CHLORIDE SERPL-SCNC: 104 MMOL/L (ref 98–107)
CO2 SERPL-SCNC: 22 MMOL/L (ref 20–31)
CREAT SERPL-MCNC: 2.83 MG/DL (ref 0.5–0.9)
EKG ATRIAL RATE: 62 BPM
EKG Q-T INTERVAL: 468 MS
EKG QRS DURATION: 90 MS
EKG QTC CALCULATION (BAZETT): 475 MS
EKG R AXIS: 69 DEGREES
EKG T AXIS: 85 DEGREES
EKG VENTRICULAR RATE: 62 BPM
GFR SERPL CREATININE-BSD FRML MDRD: 18 ML/MIN/1.73M2
GLUCOSE BLD-MCNC: 114 MG/DL (ref 65–105)
GLUCOSE SERPL-MCNC: 106 MG/DL (ref 70–99)
HBV CORE IGM SER QL: NONREACTIVE
HBV SURFACE AB SERPL IA-ACNC: >1000 MIU/ML
HBV SURFACE AG SER QL: NONREACTIVE
HCT VFR BLD AUTO: 33.8 % (ref 36–46)
HGB BLD-MCNC: 11.5 G/DL (ref 12–16)
MCH RBC QN AUTO: 32.4 PG (ref 26–34)
MCHC RBC AUTO-ENTMCNC: 33.9 G/DL (ref 31–37)
MCV RBC AUTO: 95.5 FL (ref 80–100)
PDW BLD-RTO: 15.4 % (ref 11.5–14.9)
PLATELET # BLD AUTO: 209 K/UL (ref 150–450)
PMV BLD AUTO: 8.7 FL (ref 6–12)
POTASSIUM SERPL-SCNC: 3.9 MMOL/L (ref 3.7–5.3)
RBC # BLD: 3.54 M/UL (ref 4–5.2)
SODIUM SERPL-SCNC: 141 MMOL/L (ref 135–144)
WBC # BLD AUTO: 6.6 K/UL (ref 3.5–11)

## 2023-03-27 PROCEDURE — 85027 COMPLETE CBC AUTOMATED: CPT

## 2023-03-27 PROCEDURE — 6360000002 HC RX W HCPCS: Performed by: STUDENT IN AN ORGANIZED HEALTH CARE EDUCATION/TRAINING PROGRAM

## 2023-03-27 PROCEDURE — 2580000003 HC RX 258: Performed by: STUDENT IN AN ORGANIZED HEALTH CARE EDUCATION/TRAINING PROGRAM

## 2023-03-27 PROCEDURE — 6370000000 HC RX 637 (ALT 250 FOR IP): Performed by: STUDENT IN AN ORGANIZED HEALTH CARE EDUCATION/TRAINING PROGRAM

## 2023-03-27 PROCEDURE — 2500000003 HC RX 250 WO HCPCS: Performed by: INTERNAL MEDICINE

## 2023-03-27 PROCEDURE — 99239 HOSP IP/OBS DSCHRG MGMT >30: CPT | Performed by: STUDENT IN AN ORGANIZED HEALTH CARE EDUCATION/TRAINING PROGRAM

## 2023-03-27 PROCEDURE — 80048 BASIC METABOLIC PNL TOTAL CA: CPT

## 2023-03-27 PROCEDURE — 90935 HEMODIALYSIS ONE EVALUATION: CPT

## 2023-03-27 PROCEDURE — 86705 HEP B CORE ANTIBODY IGM: CPT

## 2023-03-27 PROCEDURE — 82947 ASSAY GLUCOSE BLOOD QUANT: CPT

## 2023-03-27 PROCEDURE — 36415 COLL VENOUS BLD VENIPUNCTURE: CPT

## 2023-03-27 PROCEDURE — 86317 IMMUNOASSAY INFECTIOUS AGENT: CPT

## 2023-03-27 PROCEDURE — 87340 HEPATITIS B SURFACE AG IA: CPT

## 2023-03-27 RX ORDER — HYDROCODONE BITARTRATE AND ACETAMINOPHEN 5; 325 MG/1; MG/1
1 TABLET ORAL EVERY 8 HOURS PRN
Qty: 9 TABLET | Refills: 0
Start: 2023-03-27 | End: 2023-03-30

## 2023-03-27 RX ORDER — METHOCARBAMOL 750 MG/1
750 TABLET, FILM COATED ORAL 3 TIMES DAILY PRN
Qty: 90 TABLET | Refills: 0 | Status: ON HOLD
Start: 2023-03-27 | End: 2023-04-13 | Stop reason: ALTCHOICE

## 2023-03-27 RX ORDER — HYDROCODONE BITARTRATE AND ACETAMINOPHEN 5; 325 MG/1; MG/1
1 TABLET ORAL EVERY 8 HOURS PRN
Qty: 9 TABLET | Refills: 0 | Status: SHIPPED | OUTPATIENT
Start: 2023-03-27 | End: 2023-03-27 | Stop reason: SDUPTHER

## 2023-03-27 RX ADMIN — METHOCARBAMOL 750 MG: 750 TABLET ORAL at 14:41

## 2023-03-27 RX ADMIN — Medication 1.6 ML: at 13:22

## 2023-03-27 RX ADMIN — BUSPIRONE HYDROCHLORIDE 7.5 MG: 5 TABLET ORAL at 07:54

## 2023-03-27 RX ADMIN — ONDANSETRON 4 MG: 4 TABLET, ORALLY DISINTEGRATING ORAL at 06:03

## 2023-03-27 RX ADMIN — ASPIRIN 81 MG 81 MG: 81 TABLET ORAL at 07:54

## 2023-03-27 RX ADMIN — BUSPIRONE HYDROCHLORIDE 7.5 MG: 5 TABLET ORAL at 14:42

## 2023-03-27 RX ADMIN — METHOCARBAMOL 750 MG: 750 TABLET ORAL at 07:55

## 2023-03-27 RX ADMIN — SODIUM CHLORIDE, PRESERVATIVE FREE 10 ML: 5 INJECTION INTRAVENOUS at 07:59

## 2023-03-27 RX ADMIN — BUMETANIDE 3 MG: 1 TABLET ORAL at 07:54

## 2023-03-27 RX ADMIN — VENLAFAXINE HYDROCHLORIDE 37.5 MG: 37.5 CAPSULE, EXTENDED RELEASE ORAL at 07:55

## 2023-03-27 RX ADMIN — LINACLOTIDE 145 MCG: 145 CAPSULE, GELATIN COATED ORAL at 05:56

## 2023-03-27 RX ADMIN — HEPARIN SODIUM 5000 UNITS: 5000 INJECTION INTRAVENOUS; SUBCUTANEOUS at 14:42

## 2023-03-27 RX ADMIN — DOCUSATE SODIUM 100 MG: 100 CAPSULE, LIQUID FILLED ORAL at 07:54

## 2023-03-27 RX ADMIN — ACETAMINOPHEN 650 MG: 325 TABLET ORAL at 14:42

## 2023-03-27 RX ADMIN — PANTOPRAZOLE SODIUM 40 MG: 40 TABLET, DELAYED RELEASE ORAL at 05:56

## 2023-03-27 RX ADMIN — INSULIN GLARGINE 58 UNITS: 100 INJECTION, SOLUTION SUBCUTANEOUS at 07:56

## 2023-03-27 RX ADMIN — MORPHINE SULFATE 2 MG: 2 INJECTION, SOLUTION INTRAMUSCULAR; INTRAVENOUS at 07:59

## 2023-03-27 RX ADMIN — TAMSULOSIN HYDROCHLORIDE 0.4 MG: 0.4 CAPSULE ORAL at 07:54

## 2023-03-27 RX ADMIN — HEPARIN SODIUM 5000 UNITS: 5000 INJECTION INTRAVENOUS; SUBCUTANEOUS at 05:56

## 2023-03-27 RX ADMIN — TICAGRELOR 90 MG: 90 TABLET ORAL at 07:54

## 2023-03-27 ASSESSMENT — ENCOUNTER SYMPTOMS
EYES NEGATIVE: 1
SHORTNESS OF BREATH: 0
NAUSEA: 0
VOMITING: 0
ABDOMINAL PAIN: 0
BACK PAIN: 1
DIARRHEA: 0

## 2023-03-27 ASSESSMENT — PAIN SCALES - GENERAL
PAINLEVEL_OUTOF10: 5
PAINLEVEL_OUTOF10: 6
PAINLEVEL_OUTOF10: 2

## 2023-03-27 ASSESSMENT — PAIN DESCRIPTION - DESCRIPTORS
DESCRIPTORS: ACHING
DESCRIPTORS: ACHING

## 2023-03-27 ASSESSMENT — PAIN - FUNCTIONAL ASSESSMENT
PAIN_FUNCTIONAL_ASSESSMENT: ACTIVITIES ARE NOT PREVENTED
PAIN_FUNCTIONAL_ASSESSMENT: ACTIVITIES ARE NOT PREVENTED

## 2023-03-27 ASSESSMENT — PAIN DESCRIPTION - LOCATION
LOCATION: BACK
LOCATION: BACK

## 2023-03-27 ASSESSMENT — PAIN DESCRIPTION - ORIENTATION
ORIENTATION: MID
ORIENTATION: POSTERIOR

## 2023-03-27 NOTE — CONSULTS
Department of Internal Medicine  Nephrology Georgi Rhodes MD   Consult Note    Reason for consultation: Management of hemodialysis dependent end-stage renal disease. Consulting physician: Fiona Rivera MD.    History of present illness: This is a 70 y.o. female with a significant past medical history of type 2 diabetes mellitus, systemic hypertension, hyperlipidemia and end-stage renal disease secondary to diabetic and hypertensive nephropathy [on routine hemodialysis Monday/Wednesday/Friday at [address] dialysis unit V[facility] under the care of Dr. Haresh Chacon using IJ tunneled hemodialysis catheter], who presented to the hospital with increased muscle cramps and chest pain. She was seen and examined whilst receiving acute hemodialysis today and chest pain is described as retrosternal pressure.     Adhesive tape, Isosorbide dinitrate, Ranexa [ranolazine], Metformin and related, Ace inhibitors, Iv dye [iodides], Nsaids, and Metformin and related    Past Medical History:   Diagnosis Date    Arthritis     Backache, unspecified     Cerebral artery occlusion with cerebral infarction (HCC)     CHF (congestive heart failure) (HCC)     Chronic kidney disease     Coronary atherosclerosis of artery bypass graft     COVID 1/31/2022    Cramp of limb     Gallstones     Hemodialysis patient ([facility] [address]     Hyperlipidemia     Hypertension     Insomnia     Neuromuscular disorder ([facility] [address]     Pneumonia     Psychiatric problem     Thyroid disease     Type II or unspecified type diabetes mellitus with renal manifestations, not stated as uncontrolled(250.40)     Type II or unspecified type diabetes mellitus without mention of complication, not stated as uncontrolled     Unspecified vitamin D deficiency      Scheduled Meds:   sodium chloride flush  5-40 mL IntraVENous 2 times per day    heparin (porcine)  5,000 Units SubCUTAneous 3 times per day    aspirin  81 mg Oral Daily    atorvastatin  80 mg Oral Nightly    bumetanide  3 mg Oral BID
Department of Internal Medicine  Nephrology Maddison Roy MD   Consult Note    Reason for consultation: Management of hemodialysis dependent end-stage renal disease. Consulting physician: Chava Stallings MD.      Interval history  Pt  has no worsening shortness of breath or chest pain. Echocardiogram showed no wall motion abnormalities. She is seen at dialysis today tolerating 1.5 kg fluid removal.    History of present illness: This is a 70 y.o. female with a significant past medical history of type 2 diabetes mellitus, systemic hypertension, hyperlipidemia and end-stage renal disease secondary to diabetic and hypertensive nephropathy [on routine hemodialysis Monday/Wednesday/Friday at [address] dialysis unit [facility] under the care of Dr. Earnest Aguilar using IJ tunneled hemodialysis catheter], who presented to the hospital with increased muscle cramps and chest pain. She was seen and examined whilst receiving acute hemodialysis today and chest pain is described as retrosternal pressure.     Adhesive tape, Isosorbide dinitrate, Ranexa [ranolazine], Metformin and related, Ace inhibitors, Iv dye [iodides], Nsaids, and Metformin and related    Past Medical History:   Diagnosis Date    Arthritis     Backache, unspecified     Cerebral artery occlusion with cerebral infarction (HCC)     CHF (congestive heart failure) (HCC)     Chronic kidney disease     Coronary atherosclerosis of artery bypass graft     COVID 1/31/2022    Cramp of limb     Gallstones     Hemodialysis patient ([address]     Hyperlipidemia     Hypertension     Insomnia     Neuromuscular disorder ([facility] [address]     Pneumonia     Psychiatric problem     Thyroid disease     Type II or unspecified type diabetes mellitus with renal manifestations, not stated as uncontrolled(250.40)     Type II or unspecified type diabetes mellitus without mention of complication, not stated as uncontrolled     Unspecified vitamin D deficiency      Scheduled Meds:   insulin glargine
Department of Internal Medicine  Nephrology Raul Stern MD   Consult Note    Reason for consultation: Management of hemodialysis dependent end-stage renal disease. Consulting physician: Jesus Mercado MD.    Interval history:  Patient is seen and examined dialysis today tolerating 2 kg fluid removal denies any chest pain currently had echocardiogram with no wall motion abnormalities seen by cardiology. History of present illness: This is a 70 y.o. female with a significant past medical history of type 2 diabetes mellitus, systemic hypertension, hyperlipidemia and end-stage renal disease secondary to diabetic and hypertensive nephropathy [on routine hemodialysis Monday/Wednesday/Friday at [address] dialysis unit [facility] under the care of Dr. Bo Cruz using IJ tunneled hemodialysis catheter], who presented to the hospital with increased muscle cramps and chest pain. She was seen and examined whilst receiving acute hemodialysis today and chest pain is described as retrosternal pressure.     Adhesive tape, Isosorbide dinitrate, Ranexa [ranolazine], Metformin and related, Ace inhibitors, Iv dye [iodides], Nsaids, and Metformin and related    Past Medical History:   Diagnosis Date    Arthritis     Backache, unspecified     Cerebral artery occlusion with cerebral infarction (HCC)     CHF (congestive heart failure) (HCC)     Chronic kidney disease     Coronary atherosclerosis of artery bypass graft     COVID 1/31/2022    Cramp of limb     Gallstones     Hemodialysis patient ([address]     Hyperlipidemia     Hypertension     Insomnia     Neuromuscular disorder ([address]     Pneumonia     Psychiatric problem     Thyroid disease     Type II or unspecified type diabetes mellitus with renal manifestations, not stated as uncontrolled(250.40)     Type II or unspecified type diabetes mellitus without mention of complication, not stated as uncontrolled     Unspecified vitamin D deficiency      Scheduled Meds:   sodium
Inpatient consult to Orthopedic Surgery  Consult performed by: Faustino Gillis MD  Consult ordered by: Ronda Joseph MD      Patient: Ian Max  Unit/Bed: 8757/7664-81  YOB: 1960  MRN: [id number]  Acct: [de-identified]   Admitting Diagnosis: Chest pain [R07.9]  Chest pain, unspecified type [R07.9]  Admit Date:  3/21/2023  Hospital Day: 0    Subjective:    Patient is having problems with  thoracic back pain originating in right inferior periscapular region associated with muscle spasm. Yesterday in that location only today with some radiation to left inferior periscapular region  Back Pain  Associated symptoms include chest pain. Chest Pain   Associated symptoms include back pain. Patient Seen, Chart, Labs, Radiologystudies, and Consults reviewed. See above and J&P    Objective:   BP (!) 105/53   Pulse 63   Temp 96.8 °F (36 °C)   Resp 18   Ht 5' 5\" (1.651 m)   Wt 234 lb 2.1 oz (106.2 kg)   SpO2 99%   BMI 38.96 kg/m²   Intake/Output Summary (Last 24 hours) at 3/22/2023 1209  Last data filed at 3/22/2023 0800  Gross per 24 hour   Intake 480 ml   Output --   Net 480 ml     Review of Systems   Cardiovascular:  Positive for chest pain. Musculoskeletal:  Positive for back pain. Physical Exam  Vitals and nursing note reviewed. Constitutional:       Appearance: She is well-developed. HENT:      Head: Normocephalic and atraumatic. Nose: Nose normal.   Eyes:      Conjunctiva/sclera: Conjunctivae normal.   Pulmonary:      Effort: Pulmonary effort is normal. No respiratory distress. Musculoskeletal:      Cervical back: Normal range of motion and neck supple. 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 Neurological:      Mental Status: She is alert and oriented to person, place, and time. Sensory: No sensory deficit.
visual changes or diplopia. No scleral icterus. ENT: No Headaches, hearing loss or vertigo. No mouth sores or sore throat. Cardiovascular: As HPI  Respiratory: As HPI  Gastrointestinal: No abdominal pain, appetite loss, blood in stools. No change in bowel or bladder habits. Genitourinary: No dysuria, trouble voiding, or hematuria. Musculoskeletal:  No gait disturbance, No weakness or joint complaints. Integumentary: No rash or pruritis. Neurological: No headache, diplopia, change in muscle strength, numbness or tingling. No change in gait, balance, coordination, mood, affect, memory, mentation, behavior. Psychiatric: No anxiety, or depression. Endocrine: No temperature intolerance. No excessive thirst, fluid intake, or urination. No tremor. Hematologic/Lymphatic: No abnormal bruising or bleeding, blood clots or swollen lymph nodes. Allergic/Immunologic: No nasal congestion or hives. PHYSICAL EXAM:    Physical Examination:    /71   Pulse 80   Temp 98.7 °F (37.1 °C) (Oral)   Resp 18   Ht 5' 5\" (1.651 m)   Wt 235 lb (106.6 kg)   SpO2 99%   BMI 39.11 kg/m²    Constitutional and General Appearance: alert, cooperative, no distress and appears stated age  HEENT: PERRL, no cervical lymphadenopathy. No masses palpable. Normal oral mucosa  Respiratory:  Normal excursion and expansion without use of accessory muscles  Resp Auscultation: Good respiratory effort. No for increased work of breathing. On auscultation: clear  Cardiovascular:  Heart tones are crisp and normal. regular S1 and S2. Murmurs: none  Jugular venous pulsation Normal  Reproducible CP  Abdomen:  No masses or tenderness  Bowel sounds present  Extremities:   No Cyanosis or Clubbing   Lower extremity edema: none   Skin: Warm and dry  Neurological:  Alert and oriented.   Moves all extremities well  No abnormalities of mood, affect, memory, mentation, or behavior are noted    DATA:    Diagnostics:      EKG:   Results for orders placed or
02/27/2023 03:47 PM    WBCUA 0 TO 2 02/27/2023 03:47 PM    RBCUA 0 TO 2 02/27/2023 03:47 PM    MUCUS 1+ 02/07/2023 03:00 PM    TRICHOMONAS NOT REPORTED 11/14/2021 02:05 AM    YEAST FEW 02/07/2023 03:00 PM    BACTERIA None 02/27/2023 03:47 PM    SPECGRAV 1.011 02/27/2023 03:47 PM    LEUKOCYTESUR NEGATIVE 02/27/2023 03:47 PM    UROBILINOGEN Normal 02/27/2023 03:47 PM    BILIRUBINUR NEGATIVE 02/27/2023 03:47 PM    GLUCOSEU NEGATIVE 02/27/2023 03:47 PM    KETUA NEGATIVE 02/27/2023 03:47 PM    AMORPHOUS 1+ 02/25/2023 03:15 AM     Urine Sodium:     Lab Results   Component Value Date/Time    JASON 47 11/14/2021 02:04 AM     Urine Chloride:    Lab Results   Component Value Date/Time    CLUR 26 11/14/2021 02:04 AM     Urine Protein:     Lab Results   Component Value Date/Time    TPU 20 11/12/2021 10:30 AM     Urine Creatinine:     Lab Results   Component Value Date/Time    LABCREA 65.4 01/26/2023 01:10 PM     IMPRESSION/RECOMMENDATIONS:      1.  End-stage renal disease - will maintain MWF hemodialysis schedule. Dialysis catheter is functioning well. We will set target weight at 106 kg. Renal diet,i.e 2-gram sodium,2-gram potassium,1500 ml fluid restriction,1-gram phosphorus, 1800 KCal and 1.2 gram/kg protein per day. 2.  Systemic hypertension - blood pressure control is adequate. 3.  Chest pain - atypical -Cleared by cardiology    4. Mineral and bone disease profile -phosphorus is within goal at 3.8    5. Anemia of end-stage renal disease - hemoglobin 9.8 g/dL     Prognosis is guarded. Plan    No objections to discharge from renal standpoint.     MD LESIA Naqvi  Attending Nephrologist  3/26/2023 4:25 PM
NEGATIVE 02/27/2023 03:47 PM    COLORU Yellow 02/27/2023 03:47 PM    PHUR 6.5 02/27/2023 03:47 PM    WBCUA 0 TO 2 02/27/2023 03:47 PM    RBCUA 0 TO 2 02/27/2023 03:47 PM    MUCUS 1+ 02/07/2023 03:00 PM    TRICHOMONAS NOT REPORTED 11/14/2021 02:05 AM    YEAST FEW 02/07/2023 03:00 PM    BACTERIA None 02/27/2023 03:47 PM    SPECGRAV 1.011 02/27/2023 03:47 PM    LEUKOCYTESUR NEGATIVE 02/27/2023 03:47 PM    UROBILINOGEN Normal 02/27/2023 03:47 PM    BILIRUBINUR NEGATIVE 02/27/2023 03:47 PM    GLUCOSEU NEGATIVE 02/27/2023 03:47 PM    KETUA NEGATIVE 02/27/2023 03:47 PM    AMORPHOUS 1+ 02/25/2023 03:15 AM     Urine Sodium:     Lab Results   Component Value Date/Time    JASON 47 11/14/2021 02:04 AM     Urine Chloride:    Lab Results   Component Value Date/Time    CLUR 26 11/14/2021 02:04 AM     Urine Protein:     Lab Results   Component Value Date/Time    TPU 20 11/12/2021 10:30 AM     Urine Creatinine:     Lab Results   Component Value Date/Time    LABCREA 65.4 01/26/2023 01:10 PM     IMPRESSION/RECOMMENDATIONS:      1.  End-stage renal disease - will maintain MWF hemodialysis schedule. Dialysis catheter is functioning well. We will set target weight at 106 kg. Renal diet,i.e 2-gram sodium,2-gram potassium,1500 ml fluid restriction,1-gram phosphorus, 1800 KCal and 1.2 gram/kg protein per day. 2.  Systemic hypertension - blood pressure control is adequate. 3.  Chest pain - atypical Cleared by cardiology    4. Mineral and bone disease profile - check serum phosphorus level. 5.  Anemia of end-stage renal disease - hemoglobin 10.3 g/dL is within target range. Prognosis is guarded. No objections to discharge from renal standpoint.     MD LESIA Ragland  Attending Nephrologist  3/23/2023 1:22 PM

## 2023-03-27 NOTE — CARE COORDINATION
DISCHARGE PLANNING NOTE:    Spoke with Dr. Violeta Bob over the phone and he is unable to make it here before 3pm to write scripts for [place] and [place]. [address] [place] [address] does not have any other time slots available for transport today. Pt will be going to [facility] without prescriptions.     Electronically signed by Gibran Snow RN on 3/27/2023 at 2:38 PM

## 2023-03-27 NOTE — PLAN OF CARE
Problem: Discharge Planning  Goal: Discharge to home or other facility with appropriate resources  3/23/2023 0415 by Bill Fisher RN  Outcome: Progressing  Flowsheets (Taken 3/23/2023 1440)  Discharge to home or other facility with appropriate resources:   Identify barriers to discharge with patient and caregiver   Arrange for needed discharge resources and transportation as appropriate   Identify discharge learning needs (meds, wound care, etc)   Refer to discharge planning if patient needs post-hospital services based on physician order or complex needs related to functional status, cognitive ability or social support system  3/22/2023 1747 by Sylvia Gordon RN  Outcome: Progressing  [address] (Taken 3/22/2023 8259)  Discharge to home or other facility with appropriate resources: Refer to discharge planning if patient needs post-hospital services based on physician order or complex needs related to functional status, cognitive ability or social support system     Problem: Pain  Goal: Verbalizes/displays adequate comfort level or baseline comfort level  3/23/2023 0415 by Bill Fisher RN  Outcome: Progressing  Flowsheets (Taken 3/23/2023 0415)  Verbalizes/displays adequate comfort level or baseline comfort level:   Encourage patient to monitor pain and request assistance   Assess pain using appropriate pain scale   Administer analgesics based on type and severity of pain and evaluate response   Implement non-pharmacological measures as appropriate and evaluate response   Consider cultural and social influences on pain and pain management   Notify Licensed Independent Practitioner if interventions unsuccessful or patient reports new pain  3/22/2023 1747 by Sylvia Gordon RN  Outcome: Progressing  Flowsheets  Taken 3/22/2023 1246  Verbalizes/displays adequate comfort level or baseline comfort level:   Encourage patient to monitor pain and request assistance   Assess pain using
Problem: Discharge Planning  Goal: Discharge to home or other facility with appropriate resources  Outcome: Progressing     Problem: Pain  Goal: Verbalizes/displays adequate comfort level or baseline comfort level  Outcome: Progressing     Problem: Safety - Adult  Goal: Free from fall injury  Outcome: Progressing
Problem: Discharge Planning  Goal: Discharge to home or other facility with appropriate resources  Outcome: Progressing     Problem: Pain  Goal: Verbalizes/displays adequate comfort level or baseline comfort level  Outcome: Progressing  Note: Adequate pain control maintained this shift. Medicated per PRN orders. See MAR. Problem: Safety - Adult  Goal: Free from fall injury  Outcome: Progressing  Note: Patient is alert and oriented and remains free from falls this shift. Call light is within reach and patient is using appropriately.       Problem: Chronic Conditions and Co-morbidities  Goal: Patient's chronic conditions and co-morbidity symptoms are monitored and maintained or improved  Outcome: Progressing     Problem: ABCDS Injury Assessment  Goal: Absence of physical injury  Outcome: Progressing
Problem: Discharge Planning  Goal: Discharge to home or other facility with appropriate resources  Outcome: Progressing  Flowsheets (Taken 3/22/2023 8450)  Discharge to home or other facility with appropriate resources: Refer to discharge planning if patient needs post-hospital services based on physician order or complex needs related to functional status, cognitive ability or social support system     Problem: Pain  Goal: Verbalizes/displays adequate comfort level or baseline comfort level  Outcome: Progressing  Flowsheets  Taken 3/22/2023 1246  Verbalizes/displays adequate comfort level or baseline comfort level:   Encourage patient to monitor pain and request assistance   Assess pain using appropriate pain scale  Taken 3/22/2023 0915  Verbalizes/displays adequate comfort level or baseline comfort level:   Encourage patient to monitor pain and request assistance   Assess pain using appropriate pain scale  Taken 3/22/2023 0800  Verbalizes/displays adequate comfort level or baseline comfort level:   Encourage patient to monitor pain and request assistance   Assess pain using appropriate pain scale   Administer analgesics based on type and severity of pain and evaluate response     Problem: Chronic Conditions and Co-morbidities  Goal: Patient's chronic conditions and co-morbidity symptoms are monitored and maintained or improved  Outcome: Progressing  Flowsheets (Taken 3/22/2023 0922)  Care Plan - Patient's Chronic Conditions and Co-Morbidity Symptoms are Monitored and Maintained or Improved:   Monitor and assess patient's chronic conditions and comorbid symptoms for stability, deterioration, or improvement   Collaborate with multidisciplinary team to address chronic and comorbid conditions and prevent exacerbation or deterioration
Problem: Discharge Planning  Goal: Discharge to home or other facility with appropriate resources  Outcome: Progressing  Flowsheets (Taken 3/24/2023 2115)  Discharge to home or other facility with appropriate resources:   Identify barriers to discharge with patient and caregiver   Arrange for needed discharge resources and transportation as appropriate   Identify discharge learning needs (meds, wound care, etc)     Problem: Pain  Goal: Verbalizes/displays adequate comfort level or baseline comfort level  Outcome: Progressing     Problem: Safety - Adult  Goal: Free from fall injury  Outcome: Progressing     Problem: Chronic Conditions and Co-morbidities  Goal: Patient's chronic conditions and co-morbidity symptoms are monitored and maintained or improved  Outcome: Progressing  Flowsheets (Taken 3/24/2023 2115)  Care Plan - Patient's Chronic Conditions and Co-Morbidity Symptoms are Monitored and Maintained or Improved:   Monitor and assess patient's chronic conditions and comorbid symptoms for stability, deterioration, or improvement   Collaborate with multidisciplinary team to address chronic and comorbid conditions and prevent exacerbation or deterioration   Update acute care plan with appropriate goals if chronic or comorbid symptoms are exacerbated and prevent overall improvement and discharge     Problem: ABCDS Injury Assessment  Goal: Absence of physical injury  Outcome: Progressing
Problem: Discharge Planning  Goal: Discharge to home or other facility with appropriate resources  Outcome: Progressing  Flowsheets (Taken 3/27/2023 0145)  Discharge to home or other facility with appropriate resources: Identify barriers to discharge with patient and caregiver     Problem: Pain  Goal: Verbalizes/displays adequate comfort level or baseline comfort level  Outcome: Progressing     Problem: Safety - Adult  Goal: Free from fall injury  Outcome: Progressing  Flowsheets (Taken 3/27/2023 0332)  Free From Fall Injury: Instruct family/caregiver on patient safety     Problem: Chronic Conditions and Co-morbidities  Goal: Patient's chronic conditions and co-morbidity symptoms are monitored and maintained or improved  Outcome: Progressing  Flowsheets (Taken 3/27/2023 0145)  Care Plan - Patient's Chronic Conditions and Co-Morbidity Symptoms are Monitored and Maintained or Improved: Monitor and assess patient's chronic conditions and comorbid symptoms for stability, deterioration, or improvement     Problem: ABCDS Injury Assessment  Goal: Absence of physical injury  Outcome: Progressing  Flowsheets (Taken 3/27/2023 0332)  Absence of Physical Injury: Implement safety measures based on patient assessment
Problem: Pain  Goal: Verbalizes/displays adequate comfort level or baseline comfort level  3/23/2023 1755 by Miguelina Anderson RN  Outcome: Progressing  3/23/2023 0415 by Roberth Machuca RN  Outcome: Progressing  Flowsheets (Taken 3/23/2023 0336)  Verbalizes/displays adequate comfort level or baseline comfort level:   Encourage patient to monitor pain and request assistance   Assess pain using appropriate pain scale   Administer analgesics based on type and severity of pain and evaluate response   Implement non-pharmacological measures as appropriate and evaluate response   Consider cultural and social influences on pain and pain management   Notify Licensed Independent Practitioner if interventions unsuccessful or patient reports new pain     Problem: Safety - Adult  Goal: Free from fall injury  3/23/2023 1755 by Miguelina Anderson RN  Outcome: Progressing  Flowsheets (Taken 3/23/2023 1038 by Lorrie Oh)  Free From Fall Injury:   Instruct family/caregiver on patient safety   Based on caregiver fall risk screen, instruct family/caregiver to ask for assistance with transferring infant if caregiver noted to have fall risk factors  3/23/2023 0415 by Roberth Machuca RN  Outcome: Progressing  [address] (Taken 3/23/2023 0415)  Free From Fall Injury:   Instruct family/caregiver on patient safety   Based on caregiver fall risk screen, instruct family/caregiver to ask for assistance with transferring infant if caregiver noted to have fall risk factors     Problem: Chronic Conditions and Co-morbidities  Goal: Patient's chronic conditions and co-morbidity symptoms are monitored and maintained or improved  3/23/2023 1755 by Miguelina Anderson RN  Outcome: Progressing  Flowsheets (Taken 3/23/2023 1025 by Lorrie Oh)  Care Plan - Patient's Chronic Conditions and Co-Morbidity Symptoms are Monitored and Maintained or Improved:   Monitor and assess patient's chronic conditions and
chronic and comorbid conditions and prevent exacerbation or deterioration   Update acute care plan with appropriate goals if chronic or comorbid symptoms are exacerbated and prevent overall improvement and discharge

## 2023-03-27 NOTE — CARE COORDINATION
Informed Diana Woodson at facility that physician will not write scripts for medication because he will not be here before  time. Diana Woodson informed  that if patient comes without script she will not receive pain medications for a few days. BONNIE attempted to push transportation time back until 5:00pm or later. There is no other time slot available unless patient discharges tomorrow.     Electronically signed by AZ Rodríguez on 3/27/2023 at 2:18 PM

## 2023-03-27 NOTE — CARE COORDINATION
Transportation arranged for patient to go to [facility] [employer] via [employer]. Facility informed. Bedside nurse informed.      Number for Report: [phone]    Electronically signed by AZ Man on 3/27/2023 at 11:12 AM

## 2023-03-29 NOTE — DISCHARGE SUMMARY
[address] [facility]      Discharge Summary      NAME:  Celio Martínez  :  1960  MRN:  [id number]    Admit date:  3/21/2023  Discharge date:  3/27/2023    Admitting Physician:  Duncan Staples MD    Primary Diagnosis on Admission:   Present on Admission:   Atypical chest pain   Depression with anxiety   Type 2 diabetes mellitus with hyperglycemia, with long-term current use of insulin (HCC)   Congestive heart failure (HCC)   Chronic respiratory failure with hypoxia (HCC)   Acute thoracic back pain   ESRD on hemodialysis ([facility]   Diabetic polyneuropathy associated with type 2 diabetes mellitus ([facility]      Secondary Diagnoses:  does not have any pertinent problems on file. Admission Condition:  fair     Discharged Condition: good      Hospital Course: The patient was admitted for the management of acute onset chest and back pain. Back pain was thoracic and severe in nature. Patient has a history of coronary artery disease with a recent stent in February and she is considered high risk. There was one artery during her cardiac cath which was unable to be stented, which may be the cause of her continued chest pressure. Imaging was negative, echo showed normal cardiac function, patient is unable to tolerate Ranexa. Cardio signed off due to no further intervention planned. Patient was started on Robaxin and had improvement in back spasms. Patient also has numerous comorbid conditions including renal failure on hemodialysis, type 2 diabetes on insulin. Effexor was started at 37.5. Patient required another authorization for transfer to facility after discharge. Today on day of discharge pt feels better with no further complaints. Vitals and Labs are at pts baseline. All consultants involved during this admission are agreeable to d/c.     Consults:  cardiology, nephrology, and orthopedic surgery    Significant Diagnostic/theraputic interventions: Echo, dialysis      Disposition:

## 2023-03-31 LAB
EKG ATRIAL RATE: 70 BPM
EKG P AXIS: 57 DEGREES
EKG P-R INTERVAL: 178 MS
EKG Q-T INTERVAL: 438 MS
EKG QRS DURATION: 84 MS
EKG QTC CALCULATION (BAZETT): 473 MS
EKG R AXIS: 41 DEGREES
EKG T AXIS: -102 DEGREES
EKG VENTRICULAR RATE: 70 BPM

## 2023-04-03 PROCEDURE — 93010 ELECTROCARDIOGRAM REPORT: CPT | Performed by: INTERNAL MEDICINE

## 2023-04-04 ENCOUNTER — TELEPHONE (OUTPATIENT)
Dept: VASCULAR SURGERY | Age: 63
End: 2023-04-04

## 2023-04-04 NOTE — TELEPHONE ENCOUNTER
Patient left a voice mail that she needs to reschedule her appointment with Dr. Zhen Mendez on Thursday 04/06/2023. I attempted to call the patient back twice and have left her a voice mail to get this appointment rescheduled.

## 2023-04-13 ENCOUNTER — HOSPITAL ENCOUNTER (OUTPATIENT)
Age: 63
Setting detail: OUTPATIENT SURGERY
Discharge: HOME | End: 2023-04-13
Attending: SURGERY
Payer: COMMERCIAL

## 2023-04-13 VITALS
SYSTOLIC BLOOD PRESSURE: 134 MMHG | OXYGEN SATURATION: 95 % | DIASTOLIC BLOOD PRESSURE: 54 MMHG | RESPIRATION RATE: 19 BRPM | TEMPERATURE: 98 F | HEART RATE: 58 BPM

## 2023-04-13 PROBLEM — T82.590A DIALYSIS AV FISTULA MALFUNCTION, INITIAL ENCOUNTER (HCC): Status: ACTIVE | Noted: 2023-04-13

## 2023-04-13 PROBLEM — T82.41XA HEMODIALYSIS CATHETER DYSFUNCTION (HCC): Status: ACTIVE | Noted: 2023-04-13

## 2023-04-13 PROCEDURE — 7100000010 HC PHASE II RECOVERY - FIRST 15 MIN

## 2023-04-13 PROCEDURE — 7100000011 HC PHASE II RECOVERY - ADDTL 15 MIN

## 2023-04-13 PROCEDURE — 2580000003 HC RX 258: Performed by: SURGERY

## 2023-04-13 PROCEDURE — A4217 STERILE WATER/SALINE, 500 ML: HCPCS | Performed by: SURGERY

## 2023-04-13 PROCEDURE — 6360000002 HC RX W HCPCS: Performed by: SURGERY

## 2023-04-13 PROCEDURE — 36581 REPLACE TUNNELED CV CATH: CPT | Performed by: SURGERY

## 2023-04-13 PROCEDURE — 99214 OFFICE O/P EST MOD 30 MIN: CPT | Performed by: SURGERY

## 2023-04-13 PROCEDURE — 3600000007 HC SURGERY HYBRID BASE: Performed by: SURGERY

## 2023-04-13 PROCEDURE — 99153 MOD SED SAME PHYS/QHP EA: CPT | Performed by: SURGERY

## 2023-04-13 PROCEDURE — 77001 FLUOROGUIDE FOR VEIN DEVICE: CPT | Performed by: SURGERY

## 2023-04-13 PROCEDURE — 3600000017 HC SURGERY HYBRID ADDL 15MIN: Performed by: SURGERY

## 2023-04-13 PROCEDURE — 2500000003 HC RX 250 WO HCPCS: Performed by: SURGERY

## 2023-04-13 PROCEDURE — 99152 MOD SED SAME PHYS/QHP 5/>YRS: CPT | Performed by: SURGERY

## 2023-04-13 PROCEDURE — 2709999900 HC NON-CHARGEABLE SUPPLY: Performed by: SURGERY

## 2023-04-13 RX ORDER — LIDOCAINE HYDROCHLORIDE 10 MG/ML
INJECTION, SOLUTION EPIDURAL; INFILTRATION; INTRACAUDAL; PERINEURAL PRN
Status: DISCONTINUED | OUTPATIENT
Start: 2023-04-13 | End: 2023-04-13 | Stop reason: ALTCHOICE

## 2023-04-13 RX ORDER — LIDOCAINE HYDROCHLORIDE 10 MG/ML
INJECTION, SOLUTION INFILTRATION; PERINEURAL
Status: DISCONTINUED
Start: 2023-04-13 | End: 2023-04-13 | Stop reason: HOSPADM

## 2023-04-13 RX ORDER — SODIUM CHLORIDE 9 MG/ML
INJECTION, SOLUTION INTRAVENOUS CONTINUOUS
Status: DISCONTINUED | OUTPATIENT
Start: 2023-04-13 | End: 2023-04-15 | Stop reason: HOSPADM

## 2023-04-13 RX ORDER — IODIXANOL 320 MG/ML
INJECTION, SOLUTION INTRAVASCULAR
Status: DISCONTINUED
Start: 2023-04-13 | End: 2023-04-13 | Stop reason: WASHOUT

## 2023-04-13 RX ORDER — TIZANIDINE 4 MG/1
4 TABLET ORAL EVERY 6 HOURS PRN
Status: ON HOLD | COMMUNITY

## 2023-04-13 RX ADMIN — SODIUM CHLORIDE: 9 INJECTION, SOLUTION INTRAVENOUS at 08:55

## 2023-04-13 ASSESSMENT — ENCOUNTER SYMPTOMS
VOMITING: 0
ABDOMINAL DISTENTION: 0
CHEST TIGHTNESS: 0
EYE PAIN: 0
ABDOMINAL PAIN: 0
VOICE CHANGE: 0
SHORTNESS OF BREATH: 0
COLOR CHANGE: 0
TROUBLE SWALLOWING: 0
COUGH: 0

## 2023-04-13 NOTE — H&P
Dalton Alexandre is an 70 y.o.  female. She is on dialysis now for several months. She has a tunneled catheter in the right IJ vein. This has not worked very well over the last several weeks. Currently it is not usable. She has a fistula in the left upper extremity based off the distal brachial artery just below the elbow crease. This has been placed in 2021. It is never been used. She is a diabetic with high blood pressure and high cholesterol. She is here for catheter exchange today. I am more interested in getting her fistula operational at some point in the near future. I will discuss this with her at a later date. Past Medical History:   Diagnosis Date    Arthritis     Backache, unspecified     Cerebral artery occlusion with cerebral infarction [facility])     CHF (congestive heart failure) (HCC)     Chronic kidney disease     Coronary atherosclerosis of artery bypass graft     COVID 1/31/2022    Cramp of limb     Gallstones     Hemodialysis patient ([facility]     Hyperlipidemia     Hypertension     Insomnia     Neuromuscular disorder ([facility]     Pneumonia     Psychiatric problem     Thyroid disease     Type II or unspecified type diabetes mellitus with renal manifestations, not stated as uncontrolled(250.40)     Type II or unspecified type diabetes mellitus without mention of complication, not stated as uncontrolled     Unspecified vitamin D deficiency        Allergies: Allergies   Allergen Reactions    Adhesive Tape Other (See Comments) and Rash     Other reaction(s): Unknown    Isosorbide Dinitrate Angioedema    Ranexa [Ranolazine] Angioedema    Metformin And Related Diarrhea    Ace Inhibitors Other (See Comments)     Cough, states not good for her kidneys    Iv Dye [Iodides]      Stage 4 kidney disease    Nsaids      CANNOT TAKE D/T KIDNEY DISEASE    Metformin And Related Hives, Itching and Rash     Stage 4 kidney disease. Active Problems:    * No active hospital problems.  *  Resolved

## 2023-04-13 NOTE — PROGRESS NOTES
Received post tunnel cath procedure to [facility] room 04. Assessment obtained. Restrictions reviewed with patient. Post procedure pathway initiated. [place] cath with blood noted under dressing. Family at side. Patient without complaints.

## 2023-04-13 NOTE — PROGRESS NOTES
Patient admitted, consent signed and questions answered. Patient ready for procedure. Call light to reach with side rails up 2 of 2 Chest wiped down. Sister at bedside with patient. History and physical needs completed.

## 2023-04-13 NOTE — PROGRESS NOTES
All discharge instructions reviewed, questions answered, paper signed and given copy. Patient discharged per wheelchair with sister, d/c paperwork and belongings.

## 2023-04-13 NOTE — DISCHARGE INSTRUCTIONS
SEDATION / ANALGESIA INFORMATION / Rudy Jennifer 83 have received the sedation/analgesia medication during your visit    Sedation/analgesia is used during short medical procedures under controlled supervision. The medication will produce a strong relaxation. You will be able to hear, speak and follow instructions, but your memory and alertness will be decreased. You will be able to swallow and breathe on your own. During sedation/analgesia your blood pressure, heart and breathing will be watched closely. After the procedure, you may not remember what was said or done. You may have the following effects from the medication. \" Drowsiness, dizziness, sleepiness or confusion. \" Difficulty remembering or delayed reaction times. \" Loss of fine muscle control or difficulty with your balance especially while walking. \" Difficulty focusing or blurred vision. You may not be aware of slight changes in your behavior and/or your reaction time because of the medication used during the procedure. Therefore you should follow these instructions. \" Have someone responsible help you with your care. \" Do not drive for 24 hours. \" Do not operate equipment for 24 hours (lawnmowers, power tools, kitchen accessories, stove). \" Do not drink any alcoholic beverages for a minimum of 24 hours. \" Do not make important personal, legal or business decisions for 24 hours. \" You may experience dizziness or lightheadedness. Move slowly and carefully, do not make sudden position changes. \" Drink extra amounts of fluids today. \" Increase your diet as tolerated (unless you have received specific instructions from your doctor). \" If you feel nauseated, continue with liquids until the nausea is gone. \" Notify your physician if you have not urinated within 8 hours after the procedure. \" Resume your medications unless otherwise instructed.
Yes

## 2023-04-13 NOTE — OP NOTE
Operative Note      Patient: Naty Meyer  YOB: 1960  MRN: [id number]    Date of Procedure: 4/13/2023    Pre-Op Diagnosis Codes:     * Hemodialysis catheter dysfunction, initial encounter ([facility] Estevan Cox    Post-Op Diagnosis: Post-Op Diagnosis Codes:     * Hemodialysis catheter dysfunction, initial encounter ([facility] [address] Estevan Cox       Procedure:  Replacement of tunneled dialysis access catheter over a wire from a 19 cm long catheter to a 23 cm long catheter    Surgeon(s):  Dung Szymanski MD    Assistant:   Daisy Ortiz    Anesthesia: IV Sedation    Estimated Blood Loss (mL): Minimal    Complications: None    Specimens:   * No specimens in log *    Implants:  * No implants in log *      Drains:   [REMOVED] External Urinary Catheter (Removed)   Site Assessment Clean,dry & intact 03/26/23 0800   Placement Initiated 03/23/23 1754   Securement Method Securing device (Describe) 03/24/23 1600   Catheter Care Catheter/Wick replaced;Suction Canister/Tubing changed 03/24/23 1600   Perineal Care Yes 03/24/23 0800   Suction 40 mmgHg continuous 03/24/23 1600   Output (mL) 400 mL 03/24/23 1949       Findings: The catheter exchange went smoothly. It was placed to the bottom of the atrium. Detailed Description of Procedure:   Patient was brought to the operating room and placed in a supine position with her arms tucked at her sides. She was identified as Shawn Feil for right IJ tunneled catheter exchange as the tunneled catheter is not currently operating well. She was given light sedation with 2 of Versed and 50 of fentanyl. Her chest and neck were prepped and draped in a sterile fashion. Timeout was held before the sutures were removed from the existing catheter and lidocaine without epinephrine was infiltrated into the skin and subcutaneous tissue around the catheter entry site and the cuff. The cuff was then dislodged from the surrounding connective tissues with blunt dissection using a hemostat.

## 2023-04-17 ENCOUNTER — ANESTHESIA EVENT (OUTPATIENT)
Dept: OPERATING ROOM | Age: 63
End: 2023-04-17
Payer: COMMERCIAL

## 2023-04-18 ENCOUNTER — HOSPITAL ENCOUNTER (INPATIENT)
Age: 63
LOS: 13 days | Discharge: NURSING, REHAB OR LONG-TERM CARE | DRG: 252 | End: 2023-05-01
Attending: SURGERY | Admitting: SURGERY
Payer: COMMERCIAL

## 2023-04-18 ENCOUNTER — ANESTHESIA (OUTPATIENT)
Dept: OPERATING ROOM | Age: 63
End: 2023-04-18
Payer: COMMERCIAL

## 2023-04-18 DIAGNOSIS — G89.18 ACUTE POSTOPERATIVE PAIN: Primary | ICD-10-CM

## 2023-04-18 PROBLEM — E87.6 HYPOKALEMIA: Status: ACTIVE | Noted: 2023-04-18

## 2023-04-18 PROBLEM — N18.6 END STAGE CHRONIC KIDNEY DISEASE (HCC): Status: ACTIVE | Noted: 2023-04-18

## 2023-04-18 PROBLEM — N18.6 ESRD (END STAGE RENAL DISEASE) (HCC): Status: ACTIVE | Noted: 2023-04-18

## 2023-04-18 LAB
ANION GAP SERPL CALCULATED.3IONS-SCNC: 13 MMOL/L (ref 9–17)
BUN SERPL-MCNC: 37 MG/DL (ref 8–23)
CALCIUM SERPL-MCNC: 9.2 MG/DL (ref 8.6–10.4)
CHLORIDE SERPL-SCNC: 98 MMOL/L (ref 98–107)
CO2 SERPL-SCNC: 25 MMOL/L (ref 20–31)
CREAT SERPL-MCNC: 2.74 MG/DL (ref 0.5–0.9)
EGFR, POC: 18 ML/MIN/1.73M2
GFR SERPL CREATININE-BSD FRML MDRD: 19 ML/MIN/1.73M2
GLUCOSE BLD-MCNC: 264 MG/DL (ref 65–105)
GLUCOSE BLD-MCNC: 338 MG/DL (ref 65–105)
GLUCOSE BLD-MCNC: 366 MG/DL (ref 74–100)
GLUCOSE BLD-MCNC: 374 MG/DL (ref 65–105)
GLUCOSE SERPL-MCNC: 350 MG/DL (ref 70–99)
POC BUN: 37 MG/DL (ref 8–26)
POC CHLORIDE: 98 MMOL/L (ref 98–107)
POC CREATININE: 2.82 MG/DL (ref 0.51–1.19)
POC HEMATOCRIT: 38 % (ref 36–46)
POC HEMOGLOBIN: 12.8 G/DL (ref 12–16)
POC POTASSIUM: 3 MMOL/L (ref 3.5–4.5)
POC SODIUM: 142 MMOL/L (ref 138–146)
POTASSIUM SERPL-SCNC: 2.9 MMOL/L (ref 3.7–5.3)
POTASSIUM SERPL-SCNC: 3.3 MMOL/L (ref 3.7–5.3)
POTASSIUM SERPL-SCNC: 4 MMOL/L (ref 3.7–5.3)
REASON FOR REJECTION: NORMAL
SODIUM SERPL-SCNC: 136 MMOL/L (ref 135–144)
ZZ NTE CLEAN UP: ORDERED TEST: NORMAL
ZZ NTE WITH NAME CLEAN UP: SPECIMEN SOURCE: NORMAL

## 2023-04-18 PROCEDURE — 6360000002 HC RX W HCPCS

## 2023-04-18 PROCEDURE — 82435 ASSAY OF BLOOD CHLORIDE: CPT

## 2023-04-18 PROCEDURE — 6370000000 HC RX 637 (ALT 250 FOR IP)

## 2023-04-18 PROCEDURE — 82947 ASSAY GLUCOSE BLOOD QUANT: CPT

## 2023-04-18 PROCEDURE — 84520 ASSAY OF UREA NITROGEN: CPT

## 2023-04-18 PROCEDURE — 84132 ASSAY OF SERUM POTASSIUM: CPT

## 2023-04-18 PROCEDURE — 80048 BASIC METABOLIC PNL TOTAL CA: CPT

## 2023-04-18 PROCEDURE — 84295 ASSAY OF SERUM SODIUM: CPT

## 2023-04-18 PROCEDURE — 85014 HEMATOCRIT: CPT

## 2023-04-18 PROCEDURE — 2580000003 HC RX 258

## 2023-04-18 PROCEDURE — 36415 COLL VENOUS BLD VENIPUNCTURE: CPT

## 2023-04-18 PROCEDURE — 1200000000 HC SEMI PRIVATE

## 2023-04-18 PROCEDURE — 6370000000 HC RX 637 (ALT 250 FOR IP): Performed by: INTERNAL MEDICINE

## 2023-04-18 PROCEDURE — 82565 ASSAY OF CREATININE: CPT

## 2023-04-18 PROCEDURE — 2580000003 HC RX 258: Performed by: SURGERY

## 2023-04-18 RX ORDER — POTASSIUM CHLORIDE 7.45 MG/ML
10 INJECTION INTRAVENOUS
Status: ACTIVE | OUTPATIENT
Start: 2023-04-18 | End: 2023-04-18

## 2023-04-18 RX ORDER — ALLOPURINOL 100 MG/1
100 TABLET ORAL DAILY
Status: DISCONTINUED | OUTPATIENT
Start: 2023-04-19 | End: 2023-05-01 | Stop reason: HOSPADM

## 2023-04-18 RX ORDER — POTASSIUM CHLORIDE 20 MEQ/1
40 TABLET, EXTENDED RELEASE ORAL ONCE
Status: COMPLETED | OUTPATIENT
Start: 2023-04-18 | End: 2023-04-18

## 2023-04-18 RX ORDER — SODIUM CHLORIDE 0.9 % (FLUSH) 0.9 %
5-40 SYRINGE (ML) INJECTION PRN
Status: DISCONTINUED | OUTPATIENT
Start: 2023-04-18 | End: 2023-04-23

## 2023-04-18 RX ORDER — SODIUM CHLORIDE 0.9 % (FLUSH) 0.9 %
5-40 SYRINGE (ML) INJECTION EVERY 12 HOURS SCHEDULED
Status: DISCONTINUED | OUTPATIENT
Start: 2023-04-18 | End: 2023-05-01 | Stop reason: HOSPADM

## 2023-04-18 RX ORDER — SODIUM CHLORIDE 9 MG/ML
INJECTION, SOLUTION INTRAVENOUS CONTINUOUS
Status: DISCONTINUED | OUTPATIENT
Start: 2023-04-18 | End: 2023-04-20

## 2023-04-18 RX ORDER — BUSPIRONE HYDROCHLORIDE 5 MG/1
7.5 TABLET ORAL 3 TIMES DAILY
Status: DISCONTINUED | OUTPATIENT
Start: 2023-04-18 | End: 2023-05-01 | Stop reason: HOSPADM

## 2023-04-18 RX ORDER — INSULIN LISPRO 100 [IU]/ML
0-16 INJECTION, SOLUTION INTRAVENOUS; SUBCUTANEOUS ONCE
Status: COMPLETED | OUTPATIENT
Start: 2023-04-18 | End: 2023-04-18

## 2023-04-18 RX ORDER — GLUCAGON 1 MG/ML
1 KIT INJECTION PRN
Status: DISCONTINUED | OUTPATIENT
Start: 2023-04-18 | End: 2023-05-01 | Stop reason: HOSPADM

## 2023-04-18 RX ORDER — ONDANSETRON 2 MG/ML
4 INJECTION INTRAMUSCULAR; INTRAVENOUS EVERY 6 HOURS PRN
Status: DISCONTINUED | OUTPATIENT
Start: 2023-04-18 | End: 2023-05-01 | Stop reason: HOSPADM

## 2023-04-18 RX ORDER — CARVEDILOL 3.12 MG/1
3.12 TABLET ORAL 2 TIMES DAILY
Status: DISCONTINUED | OUTPATIENT
Start: 2023-04-18 | End: 2023-05-01 | Stop reason: HOSPADM

## 2023-04-18 RX ORDER — SODIUM CHLORIDE 9 MG/ML
INJECTION, SOLUTION INTRAVENOUS CONTINUOUS
Status: DISCONTINUED | OUTPATIENT
Start: 2023-04-18 | End: 2023-04-18

## 2023-04-18 RX ORDER — SODIUM BICARBONATE 650 MG/1
1300 TABLET ORAL 2 TIMES DAILY
Status: DISCONTINUED | OUTPATIENT
Start: 2023-04-18 | End: 2023-05-01 | Stop reason: HOSPADM

## 2023-04-18 RX ORDER — POTASSIUM CHLORIDE 7.45 MG/ML
10 INJECTION INTRAVENOUS
Status: COMPLETED | OUTPATIENT
Start: 2023-04-18 | End: 2023-04-18

## 2023-04-18 RX ORDER — BUMETANIDE 1 MG/1
3 TABLET ORAL 2 TIMES DAILY
Status: DISCONTINUED | OUTPATIENT
Start: 2023-04-18 | End: 2023-04-23

## 2023-04-18 RX ORDER — PROPOFOL 10 MG/ML
INJECTION, EMULSION INTRAVENOUS
Status: DISPENSED
Start: 2023-04-18 | End: 2023-04-18

## 2023-04-18 RX ORDER — VENLAFAXINE HYDROCHLORIDE 37.5 MG/1
37.5 CAPSULE, EXTENDED RELEASE ORAL
Status: DISCONTINUED | OUTPATIENT
Start: 2023-04-19 | End: 2023-05-01 | Stop reason: HOSPADM

## 2023-04-18 RX ORDER — ATORVASTATIN CALCIUM 80 MG/1
80 TABLET, FILM COATED ORAL NIGHTLY
Status: DISCONTINUED | OUTPATIENT
Start: 2023-04-19 | End: 2023-05-01 | Stop reason: HOSPADM

## 2023-04-18 RX ORDER — IPRATROPIUM BROMIDE AND ALBUTEROL SULFATE 2.5; .5 MG/3ML; MG/3ML
1 SOLUTION RESPIRATORY (INHALATION) EVERY 4 HOURS PRN
Status: DISCONTINUED | OUTPATIENT
Start: 2023-04-18 | End: 2023-05-01 | Stop reason: HOSPADM

## 2023-04-18 RX ORDER — DEXTROSE MONOHYDRATE 100 MG/ML
INJECTION, SOLUTION INTRAVENOUS CONTINUOUS PRN
Status: DISCONTINUED | OUTPATIENT
Start: 2023-04-18 | End: 2023-04-23

## 2023-04-18 RX ORDER — POTASSIUM CHLORIDE 20 MEQ/1
20 TABLET, EXTENDED RELEASE ORAL ONCE
Status: COMPLETED | OUTPATIENT
Start: 2023-04-18 | End: 2023-04-18

## 2023-04-18 RX ORDER — ONDANSETRON 4 MG/1
4 TABLET, ORALLY DISINTEGRATING ORAL EVERY 8 HOURS PRN
Status: DISCONTINUED | OUTPATIENT
Start: 2023-04-18 | End: 2023-05-01 | Stop reason: HOSPADM

## 2023-04-18 RX ORDER — CHOLECALCIFEROL (VITAMIN D3) 125 MCG
10 CAPSULE ORAL NIGHTLY PRN
Status: DISCONTINUED | OUTPATIENT
Start: 2023-04-18 | End: 2023-05-01 | Stop reason: HOSPADM

## 2023-04-18 RX ORDER — INSULIN LISPRO 100 [IU]/ML
0-16 INJECTION, SOLUTION INTRAVENOUS; SUBCUTANEOUS EVERY 4 HOURS
Status: DISCONTINUED | OUTPATIENT
Start: 2023-04-18 | End: 2023-04-19

## 2023-04-18 RX ORDER — HEPARIN SODIUM 5000 [USP'U]/ML
5000 INJECTION, SOLUTION INTRAVENOUS; SUBCUTANEOUS EVERY 8 HOURS SCHEDULED
Status: DISCONTINUED | OUTPATIENT
Start: 2023-04-18 | End: 2023-05-01 | Stop reason: HOSPADM

## 2023-04-18 RX ORDER — SODIUM CHLORIDE 9 MG/ML
INJECTION, SOLUTION INTRAVENOUS PRN
Status: DISCONTINUED | OUTPATIENT
Start: 2023-04-18 | End: 2023-04-23

## 2023-04-18 RX ADMIN — INSULIN LISPRO 16 UNITS: 100 INJECTION, SOLUTION INTRAVENOUS; SUBCUTANEOUS at 23:04

## 2023-04-18 RX ADMIN — SODIUM CHLORIDE: 9 INJECTION, SOLUTION INTRAVENOUS at 23:12

## 2023-04-18 RX ADMIN — POTASSIUM CHLORIDE 40 MEQ: 1500 TABLET, EXTENDED RELEASE ORAL at 12:59

## 2023-04-18 RX ADMIN — POTASSIUM CHLORIDE 10 MEQ: 7.45 INJECTION INTRAVENOUS at 07:59

## 2023-04-18 RX ADMIN — POTASSIUM CHLORIDE 20 MEQ: 1500 TABLET, EXTENDED RELEASE ORAL at 22:03

## 2023-04-18 RX ADMIN — POTASSIUM CHLORIDE 10 MEQ: 7.45 INJECTION INTRAVENOUS at 09:28

## 2023-04-18 RX ADMIN — HEPARIN SODIUM 5000 UNITS: 5000 INJECTION INTRAVENOUS; SUBCUTANEOUS at 21:54

## 2023-04-18 RX ADMIN — BUMETANIDE 3 MG: 1 TABLET ORAL at 22:04

## 2023-04-18 RX ADMIN — INSULIN LISPRO 12 UNITS: 100 INJECTION, SOLUTION INTRAVENOUS; SUBCUTANEOUS at 17:56

## 2023-04-18 RX ADMIN — SODIUM BICARBONATE 1300 MG: 648 TABLET ORAL at 22:05

## 2023-04-18 RX ADMIN — Medication 10 MG: at 22:00

## 2023-04-18 RX ADMIN — INSULIN LISPRO 16 UNITS: 100 INJECTION, SOLUTION INTRAVENOUS; SUBCUTANEOUS at 08:09

## 2023-04-18 RX ADMIN — BUSPIRONE HYDROCHLORIDE 7.5 MG: 5 TABLET ORAL at 22:03

## 2023-04-18 RX ADMIN — SODIUM CHLORIDE: 9 INJECTION, SOLUTION INTRAVENOUS at 07:54

## 2023-04-18 NOTE — ANESTHESIA PRE PROCEDURE
Diarrhea of presumed infectious origin R19.7    Epistaxis R04.0    Chronic respiratory failure with hypoxia ([facility]) J96.11    Atypical chest pain R07.89    Hemodialysis catheter dysfunction ([facility]) T82.41XA    Dialysis AV fistula malfunction, initial encounter ([facility] T82.590A       Past Medical History:        Diagnosis Date    Arthritis     Backache, unspecified     Cerebral artery occlusion with cerebral infarction ([facility]     CHF (congestive heart failure) (HCC)     Chronic kidney disease     Coronary atherosclerosis of artery bypass graft     COVID 1/31/2022    Cramp of limb     Gallstones     Hemodialysis patient ([facility]     Hyperlipidemia     Hypertension     Insomnia     [facility]     Pneumonia     Psychiatric problem     Thyroid disease     Type II or unspecified type diabetes mellitus with renal manifestations, not stated as uncontrolled(250.40)     Type II or unspecified type diabetes mellitus without mention of complication, not stated as uncontrolled     Unspecified vitamin D deficiency        Past Surgical History:        Procedure Laterality Date    ANKLE FRACTURE SURGERY      AV FISTULA CREATION  12/14/2021    BACK SURGERY      BREAST SURGERY      CARDIAC CATHETERIZATION  02/14/2023    Dr. Marjorie Danielle, OPEN N/A     COLONOSCOPY      CORONARY ARTERY BYPASS GRAFT      x3    DIALYSIS CATHETER INSERTION Right 04/13/2023    [facility] CATHETER REPLACEMENT right chest performed by Paras Pineda MD at [address] [facility] Left 12/14/2021    LEFT AV FISTULA CREATION UPPER EXTREMITY performed by Fidel Zaldivar MD at [address] [facility]      HAND SURGERY      IR TUNNELED CATHETER PLACEMENT GREATER THAN 5 YEARS  08/18/2021    IR TUNNELED CATHETER PLACEMENT GREATER THAN 5 YEARS 8/18/2021 [facility]    IR TUNNELED CATHETER PLACEMENT GREATER

## 2023-04-19 PROBLEM — D63.8 ANEMIA OF CHRONIC DISEASE: Status: ACTIVE | Noted: 2021-05-03

## 2023-04-19 PROBLEM — Z99.2 ESRD ON DIALYSIS (HCC): Status: ACTIVE | Noted: 2023-04-18

## 2023-04-19 LAB
ANION GAP SERPL CALCULATED.3IONS-SCNC: 11 MMOL/L (ref 9–17)
BUN SERPL-MCNC: 41 MG/DL (ref 8–23)
CALCIUM SERPL-MCNC: 9.3 MG/DL (ref 8.6–10.4)
CHLORIDE SERPL-SCNC: 101 MMOL/L (ref 98–107)
CO2 SERPL-SCNC: 25 MMOL/L (ref 20–31)
CREAT SERPL-MCNC: 2.8 MG/DL (ref 0.5–0.9)
GFR SERPL CREATININE-BSD FRML MDRD: 18 ML/MIN/1.73M2
GLUCOSE BLD-MCNC: 284 MG/DL (ref 65–105)
GLUCOSE BLD-MCNC: 292 MG/DL (ref 65–105)
GLUCOSE BLD-MCNC: 309 MG/DL (ref 65–105)
GLUCOSE BLD-MCNC: 410 MG/DL (ref 65–105)
GLUCOSE BLD-MCNC: 444 MG/DL (ref 65–105)
GLUCOSE BLD-MCNC: 504 MG/DL (ref 65–105)
GLUCOSE BLD-MCNC: 527 MG/DL (ref 65–105)
GLUCOSE BLD-MCNC: 548 MG/DL (ref 65–105)
GLUCOSE SERPL-MCNC: 354 MG/DL (ref 70–99)
MAGNESIUM SERPL-MCNC: 2 MG/DL (ref 1.6–2.6)
PHOSPHATE SERPL-MCNC: 4.1 MG/DL (ref 2.6–4.5)
POTASSIUM SERPL-SCNC: 3.9 MMOL/L (ref 3.7–5.3)
SODIUM SERPL-SCNC: 137 MMOL/L (ref 135–144)

## 2023-04-19 PROCEDURE — 3600000003 HC SURGERY LEVEL 3 BASE: Performed by: SURGERY

## 2023-04-19 PROCEDURE — 6370000000 HC RX 637 (ALT 250 FOR IP): Performed by: STUDENT IN AN ORGANIZED HEALTH CARE EDUCATION/TRAINING PROGRAM

## 2023-04-19 PROCEDURE — 2500000003 HC RX 250 WO HCPCS: Performed by: NURSE ANESTHETIST, CERTIFIED REGISTERED

## 2023-04-19 PROCEDURE — 6360000002 HC RX W HCPCS

## 2023-04-19 PROCEDURE — 83735 ASSAY OF MAGNESIUM: CPT

## 2023-04-19 PROCEDURE — 6370000000 HC RX 637 (ALT 250 FOR IP)

## 2023-04-19 PROCEDURE — 36415 COLL VENOUS BLD VENIPUNCTURE: CPT

## 2023-04-19 PROCEDURE — 6370000000 HC RX 637 (ALT 250 FOR IP): Performed by: SURGERY

## 2023-04-19 PROCEDURE — 2580000003 HC RX 258: Performed by: SURGERY

## 2023-04-19 PROCEDURE — 6360000002 HC RX W HCPCS: Performed by: SURGERY

## 2023-04-19 PROCEDURE — 2500000003 HC RX 250 WO HCPCS: Performed by: SURGERY

## 2023-04-19 PROCEDURE — 3700000000 HC ANESTHESIA ATTENDED CARE: Performed by: SURGERY

## 2023-04-19 PROCEDURE — 6360000002 HC RX W HCPCS: Performed by: STUDENT IN AN ORGANIZED HEALTH CARE EDUCATION/TRAINING PROGRAM

## 2023-04-19 PROCEDURE — 36832 AV FISTULA REVISION OPEN: CPT | Performed by: SURGERY

## 2023-04-19 PROCEDURE — 7100000000 HC PACU RECOVERY - FIRST 15 MIN: Performed by: SURGERY

## 2023-04-19 PROCEDURE — 6360000002 HC RX W HCPCS: Performed by: NURSE ANESTHETIST, CERTIFIED REGISTERED

## 2023-04-19 PROCEDURE — 3600000013 HC SURGERY LEVEL 3 ADDTL 15MIN: Performed by: SURGERY

## 2023-04-19 PROCEDURE — 6360000002 HC RX W HCPCS: Performed by: INTERNAL MEDICINE

## 2023-04-19 PROCEDURE — 99233 SBSQ HOSP IP/OBS HIGH 50: CPT | Performed by: INTERNAL MEDICINE

## 2023-04-19 PROCEDURE — 90935 HEMODIALYSIS ONE EVALUATION: CPT

## 2023-04-19 PROCEDURE — 80048 BASIC METABOLIC PNL TOTAL CA: CPT

## 2023-04-19 PROCEDURE — A4217 STERILE WATER/SALINE, 500 ML: HCPCS | Performed by: SURGERY

## 2023-04-19 PROCEDURE — 1200000000 HC SEMI PRIVATE

## 2023-04-19 PROCEDURE — 5A1D70Z PERFORMANCE OF URINARY FILTRATION, INTERMITTENT, LESS THAN 6 HOURS PER DAY: ICD-10-PCS | Performed by: SURGERY

## 2023-04-19 PROCEDURE — 2709999900 HC NON-CHARGEABLE SUPPLY: Performed by: SURGERY

## 2023-04-19 PROCEDURE — 7100000011 HC PHASE II RECOVERY - ADDTL 15 MIN

## 2023-04-19 PROCEDURE — 82947 ASSAY GLUCOSE BLOOD QUANT: CPT

## 2023-04-19 PROCEDURE — 7100000001 HC PACU RECOVERY - ADDTL 15 MIN: Performed by: SURGERY

## 2023-04-19 PROCEDURE — 05WY37Z REVISION OF AUTOLOGOUS TISSUE SUBSTITUTE IN UPPER VEIN, PERCUTANEOUS APPROACH: ICD-10-PCS | Performed by: SURGERY

## 2023-04-19 PROCEDURE — 7100000010 HC PHASE II RECOVERY - FIRST 15 MIN

## 2023-04-19 PROCEDURE — 3700000001 HC ADD 15 MINUTES (ANESTHESIA): Performed by: SURGERY

## 2023-04-19 PROCEDURE — 84100 ASSAY OF PHOSPHORUS: CPT

## 2023-04-19 RX ORDER — SODIUM CHLORIDE 9 MG/ML
INJECTION, SOLUTION INTRAVENOUS PRN
Status: DISCONTINUED | OUTPATIENT
Start: 2023-04-19 | End: 2023-04-19 | Stop reason: HOSPADM

## 2023-04-19 RX ORDER — MAGNESIUM HYDROXIDE 1200 MG/15ML
LIQUID ORAL CONTINUOUS PRN
Status: COMPLETED | OUTPATIENT
Start: 2023-04-19 | End: 2023-04-19

## 2023-04-19 RX ORDER — PROTAMINE SULFATE 10 MG/ML
INJECTION, SOLUTION INTRAVENOUS PRN
Status: DISCONTINUED | OUTPATIENT
Start: 2023-04-19 | End: 2023-04-19 | Stop reason: SDUPTHER

## 2023-04-19 RX ORDER — INSULIN LISPRO 100 [IU]/ML
5 INJECTION, SOLUTION INTRAVENOUS; SUBCUTANEOUS ONCE
Status: COMPLETED | OUTPATIENT
Start: 2023-04-19 | End: 2023-04-19

## 2023-04-19 RX ORDER — ACETAMINOPHEN 500 MG
1000 TABLET ORAL EVERY 8 HOURS SCHEDULED
Status: DISCONTINUED | OUTPATIENT
Start: 2023-04-19 | End: 2023-05-01 | Stop reason: HOSPADM

## 2023-04-19 RX ORDER — OXYCODONE HYDROCHLORIDE 5 MG/1
2.5 TABLET ORAL EVERY 4 HOURS PRN
Status: DISCONTINUED | OUTPATIENT
Start: 2023-04-19 | End: 2023-05-01 | Stop reason: HOSPADM

## 2023-04-19 RX ORDER — SODIUM CHLORIDE 0.9 % (FLUSH) 0.9 %
5-40 SYRINGE (ML) INJECTION EVERY 12 HOURS SCHEDULED
Status: DISCONTINUED | OUTPATIENT
Start: 2023-04-19 | End: 2023-04-19 | Stop reason: HOSPADM

## 2023-04-19 RX ORDER — ONDANSETRON 2 MG/ML
4 INJECTION INTRAMUSCULAR; INTRAVENOUS ONCE
Status: COMPLETED | OUTPATIENT
Start: 2023-04-19 | End: 2023-04-19

## 2023-04-19 RX ORDER — INSULIN LISPRO 100 [IU]/ML
0-16 INJECTION, SOLUTION INTRAVENOUS; SUBCUTANEOUS
Status: DISCONTINUED | OUTPATIENT
Start: 2023-04-20 | End: 2023-04-20

## 2023-04-19 RX ORDER — PROPOFOL 10 MG/ML
INJECTION, EMULSION INTRAVENOUS PRN
Status: DISCONTINUED | OUTPATIENT
Start: 2023-04-19 | End: 2023-04-19 | Stop reason: SDUPTHER

## 2023-04-19 RX ORDER — CEFAZOLIN SODIUM 1 G/3ML
INJECTION, POWDER, FOR SOLUTION INTRAMUSCULAR; INTRAVENOUS PRN
Status: DISCONTINUED | OUTPATIENT
Start: 2023-04-19 | End: 2023-04-19 | Stop reason: SDUPTHER

## 2023-04-19 RX ORDER — INSULIN GLARGINE 100 [IU]/ML
56 INJECTION, SOLUTION SUBCUTANEOUS 2 TIMES DAILY
Status: DISCONTINUED | OUTPATIENT
Start: 2023-04-19 | End: 2023-04-20

## 2023-04-19 RX ORDER — HEPARIN SODIUM 1000 [USP'U]/ML
1900 INJECTION, SOLUTION INTRAVENOUS; SUBCUTANEOUS PRN
Status: DISCONTINUED | OUTPATIENT
Start: 2023-04-19 | End: 2023-05-01 | Stop reason: HOSPADM

## 2023-04-19 RX ORDER — HEPARIN SODIUM 1000 [USP'U]/ML
INJECTION, SOLUTION INTRAVENOUS; SUBCUTANEOUS PRN
Status: DISCONTINUED | OUTPATIENT
Start: 2023-04-19 | End: 2023-04-19 | Stop reason: SDUPTHER

## 2023-04-19 RX ORDER — LIDOCAINE HYDROCHLORIDE 10 MG/ML
INJECTION, SOLUTION EPIDURAL; INFILTRATION; INTRACAUDAL; PERINEURAL PRN
Status: DISCONTINUED | OUTPATIENT
Start: 2023-04-19 | End: 2023-04-19 | Stop reason: SDUPTHER

## 2023-04-19 RX ORDER — FENTANYL CITRATE 50 UG/ML
INJECTION, SOLUTION INTRAMUSCULAR; INTRAVENOUS PRN
Status: DISCONTINUED | OUTPATIENT
Start: 2023-04-19 | End: 2023-04-19 | Stop reason: SDUPTHER

## 2023-04-19 RX ORDER — DEXAMETHASONE SODIUM PHOSPHATE 10 MG/ML
INJECTION, SOLUTION INTRAMUSCULAR; INTRAVENOUS PRN
Status: DISCONTINUED | OUTPATIENT
Start: 2023-04-19 | End: 2023-04-19 | Stop reason: SDUPTHER

## 2023-04-19 RX ORDER — SODIUM CHLORIDE 0.9 % (FLUSH) 0.9 %
5-40 SYRINGE (ML) INJECTION PRN
Status: DISCONTINUED | OUTPATIENT
Start: 2023-04-19 | End: 2023-04-19 | Stop reason: HOSPADM

## 2023-04-19 RX ORDER — FENTANYL CITRATE 50 UG/ML
25 INJECTION, SOLUTION INTRAMUSCULAR; INTRAVENOUS EVERY 5 MIN PRN
Status: DISCONTINUED | OUTPATIENT
Start: 2023-04-19 | End: 2023-04-19 | Stop reason: HOSPADM

## 2023-04-19 RX ORDER — LIDOCAINE HYDROCHLORIDE 10 MG/ML
INJECTION, SOLUTION INFILTRATION; PERINEURAL
Status: DISPENSED
Start: 2023-04-19 | End: 2023-04-20

## 2023-04-19 RX ORDER — ONDANSETRON 2 MG/ML
INJECTION INTRAMUSCULAR; INTRAVENOUS PRN
Status: DISCONTINUED | OUTPATIENT
Start: 2023-04-19 | End: 2023-04-19 | Stop reason: SDUPTHER

## 2023-04-19 RX ORDER — HEPARIN SODIUM 1000 [USP'U]/ML
INJECTION, SOLUTION INTRAVENOUS; SUBCUTANEOUS
Status: DISPENSED
Start: 2023-04-19 | End: 2023-04-20

## 2023-04-19 RX ORDER — LIDOCAINE HYDROCHLORIDE 10 MG/ML
INJECTION, SOLUTION EPIDURAL; INFILTRATION; INTRACAUDAL; PERINEURAL PRN
Status: DISCONTINUED | OUTPATIENT
Start: 2023-04-19 | End: 2023-04-19 | Stop reason: ALTCHOICE

## 2023-04-19 RX ADMIN — INSULIN GLARGINE 56 UNITS: 100 INJECTION, SOLUTION SUBCUTANEOUS at 21:06

## 2023-04-19 RX ADMIN — INSULIN LISPRO 20 UNITS: 100 INJECTION, SOLUTION INTRAVENOUS; SUBCUTANEOUS at 21:05

## 2023-04-19 RX ADMIN — PROPOFOL 150 MG: 10 INJECTION, EMULSION INTRAVENOUS at 13:32

## 2023-04-19 RX ADMIN — OXYCODONE HYDROCHLORIDE 2.5 MG: 5 TABLET ORAL at 19:58

## 2023-04-19 RX ADMIN — DEXAMETHASONE SODIUM PHOSPHATE 10 MG: 10 INJECTION, SOLUTION INTRAMUSCULAR; INTRAVENOUS at 13:46

## 2023-04-19 RX ADMIN — ALTEPLASE 1 MG: 2.2 INJECTION, POWDER, LYOPHILIZED, FOR SOLUTION INTRAVENOUS at 21:26

## 2023-04-19 RX ADMIN — CEFAZOLIN 2 G: 1 INJECTION, POWDER, FOR SOLUTION INTRAMUSCULAR; INTRAVENOUS at 13:41

## 2023-04-19 RX ADMIN — PROTAMINE SULFATE 60 MG: 10 INJECTION, SOLUTION INTRAVENOUS at 15:25

## 2023-04-19 RX ADMIN — INSULIN LISPRO 20 UNITS: 100 INJECTION, SOLUTION INTRAVENOUS; SUBCUTANEOUS at 23:01

## 2023-04-19 RX ADMIN — ACETAMINOPHEN 1000 MG: 500 TABLET ORAL at 21:07

## 2023-04-19 RX ADMIN — HEPARIN SODIUM 5000 UNITS: 5000 INJECTION INTRAVENOUS; SUBCUTANEOUS at 21:06

## 2023-04-19 RX ADMIN — ONDANSETRON 4 MG: 2 INJECTION INTRAMUSCULAR; INTRAVENOUS at 16:54

## 2023-04-19 RX ADMIN — PROPOFOL 50 MG: 10 INJECTION, EMULSION INTRAVENOUS at 13:48

## 2023-04-19 RX ADMIN — FENTANYL CITRATE 25 MCG: 50 INJECTION, SOLUTION INTRAMUSCULAR; INTRAVENOUS at 16:18

## 2023-04-19 RX ADMIN — FENTANYL CITRATE 50 MCG: 50 INJECTION, SOLUTION INTRAMUSCULAR; INTRAVENOUS at 13:32

## 2023-04-19 RX ADMIN — INSULIN LISPRO 5 UNITS: 100 INJECTION, SOLUTION INTRAVENOUS; SUBCUTANEOUS at 19:58

## 2023-04-19 RX ADMIN — ATORVASTATIN CALCIUM 80 MG: 80 TABLET, FILM COATED ORAL at 21:07

## 2023-04-19 RX ADMIN — LIDOCAINE HYDROCHLORIDE 50 MG: 10 INJECTION, SOLUTION EPIDURAL; INFILTRATION; INTRACAUDAL; PERINEURAL at 13:32

## 2023-04-19 RX ADMIN — FENTANYL CITRATE 50 MCG: 50 INJECTION, SOLUTION INTRAMUSCULAR; INTRAVENOUS at 13:48

## 2023-04-19 RX ADMIN — FENTANYL CITRATE 100 MCG: 50 INJECTION, SOLUTION INTRAMUSCULAR; INTRAVENOUS at 15:36

## 2023-04-19 RX ADMIN — BUSPIRONE HYDROCHLORIDE 7.5 MG: 5 TABLET ORAL at 21:07

## 2023-04-19 RX ADMIN — FENTANYL CITRATE 25 MCG: 50 INJECTION, SOLUTION INTRAMUSCULAR; INTRAVENOUS at 16:35

## 2023-04-19 RX ADMIN — INSULIN LISPRO 12 UNITS: 100 INJECTION, SOLUTION INTRAVENOUS; SUBCUTANEOUS at 05:38

## 2023-04-19 RX ADMIN — HEPARIN SODIUM 10000 UNITS: 1000 INJECTION INTRAVENOUS; SUBCUTANEOUS at 14:44

## 2023-04-19 RX ADMIN — SODIUM BICARBONATE 1300 MG: 648 TABLET ORAL at 21:07

## 2023-04-19 RX ADMIN — INSULIN LISPRO 15 UNITS: 100 INJECTION, SOLUTION INTRAVENOUS; SUBCUTANEOUS at 19:45

## 2023-04-19 RX ADMIN — CARVEDILOL 3.12 MG: 3.12 TABLET, FILM COATED ORAL at 21:08

## 2023-04-19 RX ADMIN — Medication 10 MG: at 21:08

## 2023-04-19 RX ADMIN — HYDROMORPHONE HYDROCHLORIDE 0.5 MG: 1 INJECTION, SOLUTION INTRAMUSCULAR; INTRAVENOUS; SUBCUTANEOUS at 16:43

## 2023-04-19 RX ADMIN — ALTEPLASE 1 MG: 2.2 INJECTION, POWDER, LYOPHILIZED, FOR SOLUTION INTRAVENOUS at 21:25

## 2023-04-19 RX ADMIN — ONDANSETRON 4 MG: 2 INJECTION INTRAMUSCULAR; INTRAVENOUS at 15:57

## 2023-04-19 ASSESSMENT — PAIN DESCRIPTION - DESCRIPTORS
DESCRIPTORS: SHARP
DESCRIPTORS: SHARP;SHOOTING

## 2023-04-19 ASSESSMENT — PAIN SCALES - GENERAL
PAINLEVEL_OUTOF10: 7
PAINLEVEL_OUTOF10: 4
PAINLEVEL_OUTOF10: 7

## 2023-04-19 ASSESSMENT — PAIN DESCRIPTION - ORIENTATION
ORIENTATION: LEFT

## 2023-04-19 ASSESSMENT — PAIN DESCRIPTION - PAIN TYPE
TYPE: ACUTE PAIN
TYPE: ACUTE PAIN
TYPE: SURGICAL PAIN

## 2023-04-19 ASSESSMENT — PAIN DESCRIPTION - ONSET: ONSET: GRADUAL

## 2023-04-19 ASSESSMENT — PAIN DESCRIPTION - LOCATION
LOCATION: ARM
LOCATION: ARM
LOCATION: BACK
LOCATION: ARM

## 2023-04-19 ASSESSMENT — PAIN DESCRIPTION - FREQUENCY: FREQUENCY: CONTINUOUS

## 2023-04-19 NOTE — ANESTHESIA POSTPROCEDURE EVALUATION
Department of Anesthesiology  Postprocedure Note    Patient: Taya Blandon  MRN: [id number]  YOB: 1960  Date of evaluation: 4/19/2023      Procedure Summary     Date: 04/19/23 Room / Location: [address]    Anesthesia Start: 1326 Anesthesia Stop: 1618    Procedure: LEFT AV FISTULA REVISION WITH SUPERFICIALIZATION (Left: Arm Upper) Diagnosis:       End stage renal disease ([address]      (END STAGE RENAL DISEASE)    Surgeons: Bertha Cade MD Responsible Provider: Suzanna Jack MD    Anesthesia Type: general ASA Status: 4          Anesthesia Type: No value filed.     Rambo Phase I: Rambo Score: 9    Rambo Phase II:        Anesthesia Post Evaluation    Patient location during evaluation: bedside  Patient participation: complete - patient participated  Level of consciousness: awake  Airway patency: patent  Nausea & Vomiting: no nausea and no vomiting  Complications: no  Cardiovascular status: hemodynamically stable  Respiratory status: acceptable  Hydration status: stable  Comments: BP (!) 146/52   Pulse 73   Temp 98.2 °F (36.8 °C) (Axillary)   Resp 19   Wt 223 lb 8.7 oz (101.4 kg)   SpO2 98%   BMI 37.20 kg/m²

## 2023-04-20 LAB
ABSOLUTE EOS #: <0.03 K/UL (ref 0–0.44)
ABSOLUTE IMMATURE GRANULOCYTE: 0.16 K/UL (ref 0–0.3)
ABSOLUTE LYMPH #: 1.15 K/UL (ref 1.1–3.7)
ABSOLUTE MONO #: 0.66 K/UL (ref 0.1–1.2)
ANION GAP SERPL CALCULATED.3IONS-SCNC: 11 MMOL/L (ref 9–17)
BASOPHILS # BLD: 0 % (ref 0–2)
BASOPHILS ABSOLUTE: 0.03 K/UL (ref 0–0.2)
BETA-HYDROXYBUTYRATE: 0.09 MMOL/L (ref 0.02–0.27)
BUN SERPL-MCNC: 28 MG/DL (ref 8–23)
CALCIUM SERPL-MCNC: 8.3 MG/DL (ref 8.6–10.4)
CHLORIDE SERPL-SCNC: 98 MMOL/L (ref 98–107)
CO2 SERPL-SCNC: 27 MMOL/L (ref 20–31)
CREAT SERPL-MCNC: 2.29 MG/DL (ref 0.5–0.9)
EOSINOPHILS RELATIVE PERCENT: 0 % (ref 1–4)
GFR SERPL CREATININE-BSD FRML MDRD: 23 ML/MIN/1.73M2
GLUCOSE BLD-MCNC: 185 MG/DL (ref 65–105)
GLUCOSE BLD-MCNC: 224 MG/DL (ref 65–105)
GLUCOSE BLD-MCNC: 234 MG/DL (ref 65–105)
GLUCOSE BLD-MCNC: 258 MG/DL (ref 65–105)
GLUCOSE BLD-MCNC: 292 MG/DL (ref 65–105)
GLUCOSE BLD-MCNC: 331 MG/DL (ref 65–105)
GLUCOSE BLD-MCNC: 344 MG/DL (ref 65–105)
GLUCOSE SERPL-MCNC: 179 MG/DL (ref 70–99)
HCT VFR BLD AUTO: 30.1 % (ref 36.3–47.1)
HGB BLD-MCNC: 9.8 G/DL (ref 11.9–15.1)
IMMATURE GRANULOCYTES: 1 %
LYMPHOCYTES # BLD: 8 % (ref 24–43)
MAGNESIUM SERPL-MCNC: 1.7 MG/DL (ref 1.6–2.6)
MCH RBC QN AUTO: 32.3 PG (ref 25.2–33.5)
MCHC RBC AUTO-ENTMCNC: 32.6 G/DL (ref 28.4–34.8)
MCV RBC AUTO: 99.3 FL (ref 82.6–102.9)
MONOCYTES # BLD: 5 % (ref 3–12)
NRBC AUTOMATED: 0.4 PER 100 WBC
PDW BLD-RTO: 15.4 % (ref 11.8–14.4)
PHOSPHATE SERPL-MCNC: 2.6 MG/DL (ref 2.6–4.5)
PLATELET # BLD AUTO: 164 K/UL (ref 138–453)
PMV BLD AUTO: 10.7 FL (ref 8.1–13.5)
POTASSIUM SERPL-SCNC: 4.3 MMOL/L (ref 3.7–5.3)
RBC # BLD: 3.03 M/UL (ref 3.95–5.11)
RBC # BLD: ABNORMAL 10*6/UL
SEG NEUTROPHILS: 86 % (ref 36–65)
SEGMENTED NEUTROPHILS ABSOLUTE COUNT: 12.37 K/UL (ref 1.5–8.1)
SODIUM SERPL-SCNC: 136 MMOL/L (ref 135–144)
WBC # BLD AUTO: 14.4 K/UL (ref 3.5–11.3)

## 2023-04-20 PROCEDURE — 99232 SBSQ HOSP IP/OBS MODERATE 35: CPT | Performed by: INTERNAL MEDICINE

## 2023-04-20 PROCEDURE — 84100 ASSAY OF PHOSPHORUS: CPT

## 2023-04-20 PROCEDURE — 6370000000 HC RX 637 (ALT 250 FOR IP)

## 2023-04-20 PROCEDURE — 85025 COMPLETE CBC W/AUTO DIFF WBC: CPT

## 2023-04-20 PROCEDURE — 6370000000 HC RX 637 (ALT 250 FOR IP): Performed by: STUDENT IN AN ORGANIZED HEALTH CARE EDUCATION/TRAINING PROGRAM

## 2023-04-20 PROCEDURE — 82947 ASSAY GLUCOSE BLOOD QUANT: CPT

## 2023-04-20 PROCEDURE — 6370000000 HC RX 637 (ALT 250 FOR IP): Performed by: SURGERY

## 2023-04-20 PROCEDURE — 1200000000 HC SEMI PRIVATE

## 2023-04-20 PROCEDURE — 6360000002 HC RX W HCPCS: Performed by: SURGERY

## 2023-04-20 PROCEDURE — 36415 COLL VENOUS BLD VENIPUNCTURE: CPT

## 2023-04-20 PROCEDURE — 80048 BASIC METABOLIC PNL TOTAL CA: CPT

## 2023-04-20 PROCEDURE — 2580000003 HC RX 258: Performed by: SURGERY

## 2023-04-20 PROCEDURE — 83735 ASSAY OF MAGNESIUM: CPT

## 2023-04-20 PROCEDURE — 6360000002 HC RX W HCPCS: Performed by: INTERNAL MEDICINE

## 2023-04-20 PROCEDURE — 82010 KETONE BODYS QUAN: CPT

## 2023-04-20 PROCEDURE — 94761 N-INVAS EAR/PLS OXIMETRY MLT: CPT

## 2023-04-20 PROCEDURE — 97530 THERAPEUTIC ACTIVITIES: CPT

## 2023-04-20 PROCEDURE — 97162 PT EVAL MOD COMPLEX 30 MIN: CPT

## 2023-04-20 RX ORDER — INSULIN LISPRO 100 [IU]/ML
0-16 INJECTION, SOLUTION INTRAVENOUS; SUBCUTANEOUS EVERY 4 HOURS
Status: DISCONTINUED | OUTPATIENT
Start: 2023-04-20 | End: 2023-04-25

## 2023-04-20 RX ORDER — INSULIN GLARGINE 100 [IU]/ML
56 INJECTION, SOLUTION SUBCUTANEOUS 2 TIMES DAILY
Status: DISCONTINUED | OUTPATIENT
Start: 2023-04-20 | End: 2023-05-01 | Stop reason: HOSPADM

## 2023-04-20 RX ORDER — DOCUSATE SODIUM 100 MG/1
100 CAPSULE, LIQUID FILLED ORAL DAILY
Status: DISCONTINUED | OUTPATIENT
Start: 2023-04-20 | End: 2023-05-01 | Stop reason: HOSPADM

## 2023-04-20 RX ORDER — OXYCODONE HYDROCHLORIDE 5 MG/1
2.5 TABLET ORAL EVERY 6 HOURS PRN
Qty: 10 TABLET | Refills: 0 | Status: SHIPPED | OUTPATIENT
Start: 2023-04-20 | End: 2023-04-23

## 2023-04-20 RX ADMIN — OXYCODONE HYDROCHLORIDE 2.5 MG: 5 TABLET ORAL at 21:39

## 2023-04-20 RX ADMIN — OXYCODONE HYDROCHLORIDE 2.5 MG: 5 TABLET ORAL at 06:54

## 2023-04-20 RX ADMIN — INSULIN LISPRO 4 UNITS: 100 INJECTION, SOLUTION INTRAVENOUS; SUBCUTANEOUS at 08:41

## 2023-04-20 RX ADMIN — CARVEDILOL 3.12 MG: 3.12 TABLET, FILM COATED ORAL at 08:36

## 2023-04-20 RX ADMIN — INSULIN LISPRO 8 UNITS: 100 INJECTION, SOLUTION INTRAVENOUS; SUBCUTANEOUS at 02:14

## 2023-04-20 RX ADMIN — INSULIN LISPRO 15 UNITS: 100 INJECTION, SOLUTION INTRAVENOUS; SUBCUTANEOUS at 08:03

## 2023-04-20 RX ADMIN — INSULIN LISPRO 14 UNITS: 100 INJECTION, SOLUTION INTRAVENOUS; SUBCUTANEOUS at 21:40

## 2023-04-20 RX ADMIN — INSULIN LISPRO 8 UNITS: 100 INJECTION, SOLUTION INTRAVENOUS; SUBCUTANEOUS at 04:26

## 2023-04-20 RX ADMIN — HEPARIN SODIUM 1900 UNITS: 1000 INJECTION INTRAVENOUS; SUBCUTANEOUS at 01:06

## 2023-04-20 RX ADMIN — INSULIN LISPRO 16 UNITS: 100 INJECTION, SOLUTION INTRAVENOUS; SUBCUTANEOUS at 17:42

## 2023-04-20 RX ADMIN — HEPARIN SODIUM 1900 UNITS: 1000 INJECTION INTRAVENOUS; SUBCUTANEOUS at 01:02

## 2023-04-20 RX ADMIN — BUSPIRONE HYDROCHLORIDE 7.5 MG: 5 TABLET ORAL at 21:44

## 2023-04-20 RX ADMIN — ALLOPURINOL 100 MG: 100 TABLET ORAL at 08:35

## 2023-04-20 RX ADMIN — BUMETANIDE 3 MG: 1 TABLET ORAL at 21:47

## 2023-04-20 RX ADMIN — DOCUSATE SODIUM 100 MG: 100 CAPSULE ORAL at 12:49

## 2023-04-20 RX ADMIN — INSULIN LISPRO 15 UNITS: 100 INJECTION, SOLUTION INTRAVENOUS; SUBCUTANEOUS at 12:20

## 2023-04-20 RX ADMIN — Medication 10 MG: at 21:39

## 2023-04-20 RX ADMIN — OXYCODONE HYDROCHLORIDE 2.5 MG: 5 TABLET ORAL at 11:31

## 2023-04-20 RX ADMIN — INSULIN LISPRO 14 UNITS: 100 INJECTION, SOLUTION INTRAVENOUS; SUBCUTANEOUS at 12:20

## 2023-04-20 RX ADMIN — BUSPIRONE HYDROCHLORIDE 7.5 MG: 5 TABLET ORAL at 08:35

## 2023-04-20 RX ADMIN — ACETAMINOPHEN 1000 MG: 500 TABLET ORAL at 21:39

## 2023-04-20 RX ADMIN — ACETAMINOPHEN 1000 MG: 500 TABLET ORAL at 06:50

## 2023-04-20 RX ADMIN — HEPARIN SODIUM 5000 UNITS: 5000 INJECTION INTRAVENOUS; SUBCUTANEOUS at 21:48

## 2023-04-20 RX ADMIN — ATORVASTATIN CALCIUM 80 MG: 80 TABLET, FILM COATED ORAL at 21:46

## 2023-04-20 RX ADMIN — INSULIN LISPRO 15 UNITS: 100 INJECTION, SOLUTION INTRAVENOUS; SUBCUTANEOUS at 17:41

## 2023-04-20 RX ADMIN — BUSPIRONE HYDROCHLORIDE 7.5 MG: 5 TABLET ORAL at 14:24

## 2023-04-20 RX ADMIN — SODIUM BICARBONATE 1300 MG: 648 TABLET ORAL at 08:35

## 2023-04-20 RX ADMIN — INSULIN GLARGINE 56 UNITS: 100 INJECTION, SOLUTION SUBCUTANEOUS at 21:40

## 2023-04-20 RX ADMIN — SODIUM CHLORIDE, PRESERVATIVE FREE 10 ML: 5 INJECTION INTRAVENOUS at 21:49

## 2023-04-20 RX ADMIN — BUMETANIDE 3 MG: 1 TABLET ORAL at 08:35

## 2023-04-20 RX ADMIN — VENLAFAXINE HYDROCHLORIDE 37.5 MG: 37.5 CAPSULE, EXTENDED RELEASE ORAL at 08:35

## 2023-04-20 RX ADMIN — SODIUM BICARBONATE 1300 MG: 648 TABLET ORAL at 21:48

## 2023-04-20 RX ADMIN — CARVEDILOL 3.12 MG: 3.12 TABLET, FILM COATED ORAL at 21:47

## 2023-04-20 RX ADMIN — ACETAMINOPHEN 1000 MG: 500 TABLET ORAL at 14:24

## 2023-04-20 RX ADMIN — INSULIN GLARGINE 56 UNITS: 100 INJECTION, SOLUTION SUBCUTANEOUS at 12:20

## 2023-04-20 RX ADMIN — HEPARIN SODIUM 5000 UNITS: 5000 INJECTION INTRAVENOUS; SUBCUTANEOUS at 14:24

## 2023-04-20 ASSESSMENT — PAIN DESCRIPTION - ORIENTATION
ORIENTATION: LEFT
ORIENTATION: LEFT

## 2023-04-20 ASSESSMENT — PAIN DESCRIPTION - ONSET: ONSET: GRADUAL

## 2023-04-20 ASSESSMENT — PAIN DESCRIPTION - LOCATION
LOCATION: ARM
LOCATION: ARM

## 2023-04-20 ASSESSMENT — PAIN SCALES - GENERAL
PAINLEVEL_OUTOF10: 2
PAINLEVEL_OUTOF10: 5
PAINLEVEL_OUTOF10: 6
PAINLEVEL_OUTOF10: 6

## 2023-04-20 ASSESSMENT — PAIN DESCRIPTION - FREQUENCY: FREQUENCY: INTERMITTENT

## 2023-04-20 ASSESSMENT — PAIN DESCRIPTION - PAIN TYPE: TYPE: SURGICAL PAIN

## 2023-04-20 ASSESSMENT — PAIN DESCRIPTION - DESCRIPTORS: DESCRIPTORS: ACHING

## 2023-04-21 LAB
ABSOLUTE EOS #: 0.27 K/UL (ref 0–0.44)
ABSOLUTE IMMATURE GRANULOCYTE: 0.1 K/UL (ref 0–0.3)
ABSOLUTE LYMPH #: 1.88 K/UL (ref 1.1–3.7)
ABSOLUTE MONO #: 0.47 K/UL (ref 0.1–1.2)
ANION GAP SERPL CALCULATED.3IONS-SCNC: 13 MMOL/L (ref 9–17)
BASOPHILS # BLD: 1 % (ref 0–2)
BASOPHILS ABSOLUTE: 0.06 K/UL (ref 0–0.2)
BUN SERPL-MCNC: 40 MG/DL (ref 8–23)
CALCIUM SERPL-MCNC: 8.3 MG/DL (ref 8.6–10.4)
CHLORIDE SERPL-SCNC: 99 MMOL/L (ref 98–107)
CO2 SERPL-SCNC: 25 MMOL/L (ref 20–31)
CREAT SERPL-MCNC: 3.25 MG/DL (ref 0.5–0.9)
EOSINOPHILS RELATIVE PERCENT: 3 % (ref 1–4)
GFR SERPL CREATININE-BSD FRML MDRD: 15 ML/MIN/1.73M2
GLUCOSE BLD-MCNC: 156 MG/DL (ref 65–105)
GLUCOSE BLD-MCNC: 178 MG/DL (ref 65–105)
GLUCOSE BLD-MCNC: 179 MG/DL (ref 65–105)
GLUCOSE BLD-MCNC: 184 MG/DL (ref 65–105)
GLUCOSE BLD-MCNC: 236 MG/DL (ref 65–105)
GLUCOSE BLD-MCNC: 245 MG/DL (ref 65–105)
GLUCOSE SERPL-MCNC: 170 MG/DL (ref 70–99)
HCT VFR BLD AUTO: 30.6 % (ref 36.3–47.1)
HGB BLD-MCNC: 9.6 G/DL (ref 11.9–15.1)
IMMATURE GRANULOCYTES: 1 %
LYMPHOCYTES # BLD: 22 % (ref 24–43)
MAGNESIUM SERPL-MCNC: 1.9 MG/DL (ref 1.6–2.6)
MCH RBC QN AUTO: 33.4 PG (ref 25.2–33.5)
MCHC RBC AUTO-ENTMCNC: 31.4 G/DL (ref 28.4–34.8)
MCV RBC AUTO: 106.6 FL (ref 82.6–102.9)
MONOCYTES # BLD: 6 % (ref 3–12)
NRBC AUTOMATED: 0.4 PER 100 WBC
PDW BLD-RTO: 16.1 % (ref 11.8–14.4)
PHOSPHATE SERPL-MCNC: 5.2 MG/DL (ref 2.6–4.5)
PLATELET # BLD AUTO: 91 K/UL (ref 138–453)
PMV BLD AUTO: 11.2 FL (ref 8.1–13.5)
POTASSIUM SERPL-SCNC: 4.1 MMOL/L (ref 3.7–5.3)
RBC # BLD: 2.87 M/UL (ref 3.95–5.11)
RBC # BLD: ABNORMAL 10*6/UL
RBC # BLD: ABNORMAL 10*6/UL
SEG NEUTROPHILS: 67 % (ref 36–65)
SEGMENTED NEUTROPHILS ABSOLUTE COUNT: 5.75 K/UL (ref 1.5–8.1)
SODIUM SERPL-SCNC: 137 MMOL/L (ref 135–144)
WBC # BLD AUTO: 8.5 K/UL (ref 3.5–11.3)

## 2023-04-21 PROCEDURE — 2580000003 HC RX 258: Performed by: SURGERY

## 2023-04-21 PROCEDURE — 6360000002 HC RX W HCPCS: Performed by: INTERNAL MEDICINE

## 2023-04-21 PROCEDURE — 6370000000 HC RX 637 (ALT 250 FOR IP): Performed by: SURGERY

## 2023-04-21 PROCEDURE — 1200000000 HC SEMI PRIVATE

## 2023-04-21 PROCEDURE — 97116 GAIT TRAINING THERAPY: CPT

## 2023-04-21 PROCEDURE — 6370000000 HC RX 637 (ALT 250 FOR IP): Performed by: STUDENT IN AN ORGANIZED HEALTH CARE EDUCATION/TRAINING PROGRAM

## 2023-04-21 PROCEDURE — 90935 HEMODIALYSIS ONE EVALUATION: CPT | Performed by: INTERNAL MEDICINE

## 2023-04-21 PROCEDURE — 85025 COMPLETE CBC W/AUTO DIFF WBC: CPT

## 2023-04-21 PROCEDURE — 83735 ASSAY OF MAGNESIUM: CPT

## 2023-04-21 PROCEDURE — 36415 COLL VENOUS BLD VENIPUNCTURE: CPT

## 2023-04-21 PROCEDURE — 84100 ASSAY OF PHOSPHORUS: CPT

## 2023-04-21 PROCEDURE — 6370000000 HC RX 637 (ALT 250 FOR IP)

## 2023-04-21 PROCEDURE — 6360000002 HC RX W HCPCS: Performed by: SURGERY

## 2023-04-21 PROCEDURE — 80048 BASIC METABOLIC PNL TOTAL CA: CPT

## 2023-04-21 PROCEDURE — 97110 THERAPEUTIC EXERCISES: CPT

## 2023-04-21 PROCEDURE — 82947 ASSAY GLUCOSE BLOOD QUANT: CPT

## 2023-04-21 PROCEDURE — 90935 HEMODIALYSIS ONE EVALUATION: CPT

## 2023-04-21 RX ADMIN — CARVEDILOL 3.12 MG: 3.12 TABLET, FILM COATED ORAL at 20:50

## 2023-04-21 RX ADMIN — CARVEDILOL 3.12 MG: 3.12 TABLET, FILM COATED ORAL at 07:48

## 2023-04-21 RX ADMIN — INSULIN LISPRO 15 UNITS: 100 INJECTION, SOLUTION INTRAVENOUS; SUBCUTANEOUS at 17:18

## 2023-04-21 RX ADMIN — INSULIN GLARGINE 56 UNITS: 100 INJECTION, SOLUTION SUBCUTANEOUS at 20:45

## 2023-04-21 RX ADMIN — BUSPIRONE HYDROCHLORIDE 7.5 MG: 5 TABLET ORAL at 20:50

## 2023-04-21 RX ADMIN — SODIUM CHLORIDE, PRESERVATIVE FREE 10 ML: 5 INJECTION INTRAVENOUS at 20:49

## 2023-04-21 RX ADMIN — BUMETANIDE 3 MG: 1 TABLET ORAL at 07:49

## 2023-04-21 RX ADMIN — ACETAMINOPHEN 1000 MG: 500 TABLET ORAL at 05:00

## 2023-04-21 RX ADMIN — OXYCODONE HYDROCHLORIDE 2.5 MG: 5 TABLET ORAL at 14:30

## 2023-04-21 RX ADMIN — TICAGRELOR 90 MG: 90 TABLET ORAL at 20:52

## 2023-04-21 RX ADMIN — OXYCODONE HYDROCHLORIDE 2.5 MG: 5 TABLET ORAL at 05:43

## 2023-04-21 RX ADMIN — BUSPIRONE HYDROCHLORIDE 7.5 MG: 5 TABLET ORAL at 07:48

## 2023-04-21 RX ADMIN — SODIUM BICARBONATE 1300 MG: 648 TABLET ORAL at 20:50

## 2023-04-21 RX ADMIN — HEPARIN SODIUM 5000 UNITS: 5000 INJECTION INTRAVENOUS; SUBCUTANEOUS at 22:30

## 2023-04-21 RX ADMIN — BUMETANIDE 3 MG: 1 TABLET ORAL at 20:49

## 2023-04-21 RX ADMIN — ACETAMINOPHEN 1000 MG: 500 TABLET ORAL at 20:57

## 2023-04-21 RX ADMIN — SODIUM BICARBONATE 1300 MG: 648 TABLET ORAL at 07:49

## 2023-04-21 RX ADMIN — HEPARIN SODIUM 5000 UNITS: 5000 INJECTION INTRAVENOUS; SUBCUTANEOUS at 05:43

## 2023-04-21 RX ADMIN — DOCUSATE SODIUM 100 MG: 100 CAPSULE ORAL at 07:49

## 2023-04-21 RX ADMIN — HEPARIN SODIUM 5000 UNITS: 5000 INJECTION INTRAVENOUS; SUBCUTANEOUS at 14:30

## 2023-04-21 RX ADMIN — ACETAMINOPHEN 1000 MG: 500 TABLET ORAL at 14:30

## 2023-04-21 RX ADMIN — INSULIN LISPRO 15 UNITS: 100 INJECTION, SOLUTION INTRAVENOUS; SUBCUTANEOUS at 07:54

## 2023-04-21 RX ADMIN — INSULIN LISPRO 8 UNITS: 100 INJECTION, SOLUTION INTRAVENOUS; SUBCUTANEOUS at 17:17

## 2023-04-21 RX ADMIN — BUSPIRONE HYDROCHLORIDE 7.5 MG: 5 TABLET ORAL at 14:30

## 2023-04-21 RX ADMIN — VENLAFAXINE HYDROCHLORIDE 37.5 MG: 37.5 CAPSULE, EXTENDED RELEASE ORAL at 07:49

## 2023-04-21 RX ADMIN — SODIUM CHLORIDE, PRESERVATIVE FREE 10 ML: 5 INJECTION INTRAVENOUS at 07:57

## 2023-04-21 RX ADMIN — ATORVASTATIN CALCIUM 80 MG: 80 TABLET, FILM COATED ORAL at 20:50

## 2023-04-21 RX ADMIN — HEPARIN SODIUM 1900 UNITS: 1000 INJECTION INTRAVENOUS; SUBCUTANEOUS at 12:40

## 2023-04-21 RX ADMIN — OXYCODONE HYDROCHLORIDE 2.5 MG: 5 TABLET ORAL at 20:58

## 2023-04-21 RX ADMIN — ALLOPURINOL 100 MG: 100 TABLET ORAL at 07:48

## 2023-04-21 RX ADMIN — INSULIN LISPRO 8 UNITS: 100 INJECTION, SOLUTION INTRAVENOUS; SUBCUTANEOUS at 20:46

## 2023-04-21 RX ADMIN — Medication 10 MG: at 20:47

## 2023-04-21 RX ADMIN — INSULIN GLARGINE 56 UNITS: 100 INJECTION, SOLUTION SUBCUTANEOUS at 07:46

## 2023-04-21 ASSESSMENT — PAIN SCALES - GENERAL
PAINLEVEL_OUTOF10: 5
PAINLEVEL_OUTOF10: 4
PAINLEVEL_OUTOF10: 0
PAINLEVEL_OUTOF10: 3
PAINLEVEL_OUTOF10: 6
PAINLEVEL_OUTOF10: 6
PAINLEVEL_OUTOF10: 0

## 2023-04-21 ASSESSMENT — PAIN DESCRIPTION - PAIN TYPE: TYPE: SURGICAL PAIN

## 2023-04-21 ASSESSMENT — PAIN DESCRIPTION - LOCATION
LOCATION: ARM
LOCATION: ARM
LOCATION: FOOT
LOCATION: ARM

## 2023-04-21 ASSESSMENT — PAIN - FUNCTIONAL ASSESSMENT: PAIN_FUNCTIONAL_ASSESSMENT: ACTIVITIES ARE NOT PREVENTED

## 2023-04-21 ASSESSMENT — PAIN DESCRIPTION - FREQUENCY: FREQUENCY: INTERMITTENT

## 2023-04-21 ASSESSMENT — PAIN DESCRIPTION - ORIENTATION
ORIENTATION: LEFT
ORIENTATION: LEFT
ORIENTATION: RIGHT;LEFT

## 2023-04-21 ASSESSMENT — PAIN DESCRIPTION - DESCRIPTORS
DESCRIPTORS: ACHING
DESCRIPTORS: ACHING

## 2023-04-21 ASSESSMENT — PAIN DESCRIPTION - ONSET: ONSET: GRADUAL

## 2023-04-22 LAB
ANION GAP SERPL CALCULATED.3IONS-SCNC: 12 MMOL/L (ref 9–17)
BUN SERPL-MCNC: 25 MG/DL (ref 8–23)
CALCIUM SERPL-MCNC: 8.3 MG/DL (ref 8.6–10.4)
CHLORIDE SERPL-SCNC: 99 MMOL/L (ref 98–107)
CO2 SERPL-SCNC: 28 MMOL/L (ref 20–31)
CREAT SERPL-MCNC: 2.32 MG/DL (ref 0.5–0.9)
GFR SERPL CREATININE-BSD FRML MDRD: 23 ML/MIN/1.73M2
GLUCOSE BLD-MCNC: 136 MG/DL (ref 65–105)
GLUCOSE BLD-MCNC: 141 MG/DL (ref 65–105)
GLUCOSE BLD-MCNC: 142 MG/DL (ref 65–105)
GLUCOSE BLD-MCNC: 189 MG/DL (ref 65–105)
GLUCOSE BLD-MCNC: 235 MG/DL (ref 65–105)
GLUCOSE SERPL-MCNC: 187 MG/DL (ref 70–99)
MAGNESIUM SERPL-MCNC: 1.6 MG/DL (ref 1.6–2.6)
PHOSPHATE SERPL-MCNC: 3.3 MG/DL (ref 2.6–4.5)
POTASSIUM SERPL-SCNC: 3.8 MMOL/L (ref 3.7–5.3)
SODIUM SERPL-SCNC: 139 MMOL/L (ref 135–144)

## 2023-04-22 PROCEDURE — 6360000002 HC RX W HCPCS: Performed by: SURGERY

## 2023-04-22 PROCEDURE — 6370000000 HC RX 637 (ALT 250 FOR IP): Performed by: SURGERY

## 2023-04-22 PROCEDURE — 84100 ASSAY OF PHOSPHORUS: CPT

## 2023-04-22 PROCEDURE — 6370000000 HC RX 637 (ALT 250 FOR IP)

## 2023-04-22 PROCEDURE — 1200000000 HC SEMI PRIVATE

## 2023-04-22 PROCEDURE — 80048 BASIC METABOLIC PNL TOTAL CA: CPT

## 2023-04-22 PROCEDURE — 6370000000 HC RX 637 (ALT 250 FOR IP): Performed by: STUDENT IN AN ORGANIZED HEALTH CARE EDUCATION/TRAINING PROGRAM

## 2023-04-22 PROCEDURE — 6360000002 HC RX W HCPCS

## 2023-04-22 PROCEDURE — 36415 COLL VENOUS BLD VENIPUNCTURE: CPT

## 2023-04-22 PROCEDURE — 2580000003 HC RX 258: Performed by: SURGERY

## 2023-04-22 PROCEDURE — 83735 ASSAY OF MAGNESIUM: CPT

## 2023-04-22 PROCEDURE — 82947 ASSAY GLUCOSE BLOOD QUANT: CPT

## 2023-04-22 PROCEDURE — 99232 SBSQ HOSP IP/OBS MODERATE 35: CPT | Performed by: INTERNAL MEDICINE

## 2023-04-22 RX ORDER — MAGNESIUM SULFATE IN WATER 40 MG/ML
2000 INJECTION, SOLUTION INTRAVENOUS ONCE
Status: COMPLETED | OUTPATIENT
Start: 2023-04-22 | End: 2023-04-22

## 2023-04-22 RX ADMIN — INSULIN LISPRO 4 UNITS: 100 INJECTION, SOLUTION INTRAVENOUS; SUBCUTANEOUS at 08:57

## 2023-04-22 RX ADMIN — ACETAMINOPHEN 1000 MG: 500 TABLET ORAL at 21:30

## 2023-04-22 RX ADMIN — OXYCODONE HYDROCHLORIDE 2.5 MG: 5 TABLET ORAL at 08:55

## 2023-04-22 RX ADMIN — SODIUM CHLORIDE, PRESERVATIVE FREE 10 ML: 5 INJECTION INTRAVENOUS at 21:31

## 2023-04-22 RX ADMIN — CARVEDILOL 3.12 MG: 3.12 TABLET, FILM COATED ORAL at 21:30

## 2023-04-22 RX ADMIN — HEPARIN SODIUM 5000 UNITS: 5000 INJECTION INTRAVENOUS; SUBCUTANEOUS at 21:30

## 2023-04-22 RX ADMIN — OXYCODONE HYDROCHLORIDE 2.5 MG: 5 TABLET ORAL at 21:30

## 2023-04-22 RX ADMIN — DOCUSATE SODIUM 100 MG: 100 CAPSULE ORAL at 08:55

## 2023-04-22 RX ADMIN — SODIUM CHLORIDE: 9 INJECTION, SOLUTION INTRAVENOUS at 09:14

## 2023-04-22 RX ADMIN — INSULIN LISPRO 15 UNITS: 100 INJECTION, SOLUTION INTRAVENOUS; SUBCUTANEOUS at 17:50

## 2023-04-22 RX ADMIN — ACETAMINOPHEN 1000 MG: 500 TABLET ORAL at 16:35

## 2023-04-22 RX ADMIN — BUMETANIDE 3 MG: 1 TABLET ORAL at 08:56

## 2023-04-22 RX ADMIN — INSULIN LISPRO 8 UNITS: 100 INJECTION, SOLUTION INTRAVENOUS; SUBCUTANEOUS at 12:45

## 2023-04-22 RX ADMIN — INSULIN GLARGINE 56 UNITS: 100 INJECTION, SOLUTION SUBCUTANEOUS at 21:30

## 2023-04-22 RX ADMIN — TICAGRELOR 90 MG: 90 TABLET ORAL at 21:31

## 2023-04-22 RX ADMIN — HEPARIN SODIUM 5000 UNITS: 5000 INJECTION INTRAVENOUS; SUBCUTANEOUS at 16:35

## 2023-04-22 RX ADMIN — TICAGRELOR 90 MG: 90 TABLET ORAL at 08:57

## 2023-04-22 RX ADMIN — MAGNESIUM SULFATE HEPTAHYDRATE 2000 MG: 40 INJECTION, SOLUTION INTRAVENOUS at 09:15

## 2023-04-22 RX ADMIN — ATORVASTATIN CALCIUM 80 MG: 80 TABLET, FILM COATED ORAL at 21:30

## 2023-04-22 RX ADMIN — HEPARIN SODIUM 5000 UNITS: 5000 INJECTION INTRAVENOUS; SUBCUTANEOUS at 06:34

## 2023-04-22 RX ADMIN — ACETAMINOPHEN 1000 MG: 500 TABLET ORAL at 06:34

## 2023-04-22 RX ADMIN — BUMETANIDE 3 MG: 1 TABLET ORAL at 21:31

## 2023-04-22 RX ADMIN — VENLAFAXINE HYDROCHLORIDE 37.5 MG: 37.5 CAPSULE, EXTENDED RELEASE ORAL at 08:57

## 2023-04-22 RX ADMIN — SODIUM BICARBONATE 1300 MG: 648 TABLET ORAL at 21:31

## 2023-04-22 RX ADMIN — BUSPIRONE HYDROCHLORIDE 7.5 MG: 5 TABLET ORAL at 08:55

## 2023-04-22 RX ADMIN — BUSPIRONE HYDROCHLORIDE 7.5 MG: 5 TABLET ORAL at 16:35

## 2023-04-22 RX ADMIN — ALLOPURINOL 100 MG: 100 TABLET ORAL at 08:56

## 2023-04-22 RX ADMIN — INSULIN GLARGINE 56 UNITS: 100 INJECTION, SOLUTION SUBCUTANEOUS at 08:58

## 2023-04-22 RX ADMIN — CARVEDILOL 3.12 MG: 3.12 TABLET, FILM COATED ORAL at 08:56

## 2023-04-22 RX ADMIN — INSULIN LISPRO 15 UNITS: 100 INJECTION, SOLUTION INTRAVENOUS; SUBCUTANEOUS at 09:00

## 2023-04-22 RX ADMIN — Medication 10 MG: at 21:29

## 2023-04-22 RX ADMIN — BUSPIRONE HYDROCHLORIDE 7.5 MG: 5 TABLET ORAL at 21:30

## 2023-04-22 RX ADMIN — SODIUM BICARBONATE 1300 MG: 648 TABLET ORAL at 08:55

## 2023-04-22 RX ADMIN — INSULIN LISPRO 15 UNITS: 100 INJECTION, SOLUTION INTRAVENOUS; SUBCUTANEOUS at 12:45

## 2023-04-22 ASSESSMENT — PAIN DESCRIPTION - ORIENTATION: ORIENTATION: LEFT

## 2023-04-22 ASSESSMENT — PAIN DESCRIPTION - LOCATION: LOCATION: ARM

## 2023-04-22 ASSESSMENT — PAIN DESCRIPTION - DESCRIPTORS: DESCRIPTORS: DULL;ACHING

## 2023-04-22 ASSESSMENT — PAIN SCALES - GENERAL: PAINLEVEL_OUTOF10: 6

## 2023-04-23 LAB
GLUCOSE BLD-MCNC: 103 MG/DL (ref 65–105)
GLUCOSE BLD-MCNC: 144 MG/DL (ref 65–105)
GLUCOSE BLD-MCNC: 170 MG/DL (ref 65–105)
GLUCOSE BLD-MCNC: 197 MG/DL (ref 65–105)
GLUCOSE BLD-MCNC: 199 MG/DL (ref 65–105)
GLUCOSE BLD-MCNC: 77 MG/DL (ref 65–105)

## 2023-04-23 PROCEDURE — 99232 SBSQ HOSP IP/OBS MODERATE 35: CPT | Performed by: INTERNAL MEDICINE

## 2023-04-23 PROCEDURE — 6370000000 HC RX 637 (ALT 250 FOR IP): Performed by: SURGERY

## 2023-04-23 PROCEDURE — 6370000000 HC RX 637 (ALT 250 FOR IP): Performed by: STUDENT IN AN ORGANIZED HEALTH CARE EDUCATION/TRAINING PROGRAM

## 2023-04-23 PROCEDURE — 1200000000 HC SEMI PRIVATE

## 2023-04-23 PROCEDURE — 97535 SELF CARE MNGMENT TRAINING: CPT

## 2023-04-23 PROCEDURE — 6370000000 HC RX 637 (ALT 250 FOR IP)

## 2023-04-23 PROCEDURE — 2580000003 HC RX 258: Performed by: SURGERY

## 2023-04-23 PROCEDURE — 6360000002 HC RX W HCPCS: Performed by: SURGERY

## 2023-04-23 PROCEDURE — 6370000000 HC RX 637 (ALT 250 FOR IP): Performed by: NURSE PRACTITIONER

## 2023-04-23 PROCEDURE — 97166 OT EVAL MOD COMPLEX 45 MIN: CPT

## 2023-04-23 PROCEDURE — 82947 ASSAY GLUCOSE BLOOD QUANT: CPT

## 2023-04-23 PROCEDURE — 97167 OT EVAL HIGH COMPLEX 60 MIN: CPT

## 2023-04-23 RX ORDER — BUMETANIDE 1 MG/1
3 TABLET ORAL 2 TIMES DAILY
Status: DISCONTINUED | OUTPATIENT
Start: 2023-04-23 | End: 2023-05-01 | Stop reason: HOSPADM

## 2023-04-23 RX ORDER — OXYCODONE HYDROCHLORIDE 5 MG/1
5 TABLET ORAL ONCE
Status: DISCONTINUED | OUTPATIENT
Start: 2023-04-23 | End: 2023-05-01 | Stop reason: HOSPADM

## 2023-04-23 RX ADMIN — SODIUM BICARBONATE 1300 MG: 648 TABLET ORAL at 21:15

## 2023-04-23 RX ADMIN — SODIUM BICARBONATE 1300 MG: 648 TABLET ORAL at 09:56

## 2023-04-23 RX ADMIN — TICAGRELOR 90 MG: 90 TABLET ORAL at 21:15

## 2023-04-23 RX ADMIN — HEPARIN SODIUM 5000 UNITS: 5000 INJECTION INTRAVENOUS; SUBCUTANEOUS at 21:15

## 2023-04-23 RX ADMIN — Medication 10 MG: at 21:15

## 2023-04-23 RX ADMIN — CARVEDILOL 3.12 MG: 3.12 TABLET, FILM COATED ORAL at 21:15

## 2023-04-23 RX ADMIN — OXYCODONE HYDROCHLORIDE 2.5 MG: 5 TABLET ORAL at 07:50

## 2023-04-23 RX ADMIN — ACETAMINOPHEN 1000 MG: 500 TABLET ORAL at 21:15

## 2023-04-23 RX ADMIN — CARVEDILOL 3.12 MG: 3.12 TABLET, FILM COATED ORAL at 09:56

## 2023-04-23 RX ADMIN — INSULIN LISPRO 15 UNITS: 100 INJECTION, SOLUTION INTRAVENOUS; SUBCUTANEOUS at 18:01

## 2023-04-23 RX ADMIN — HEPARIN SODIUM 5000 UNITS: 5000 INJECTION INTRAVENOUS; SUBCUTANEOUS at 06:11

## 2023-04-23 RX ADMIN — INSULIN LISPRO 4 UNITS: 100 INJECTION, SOLUTION INTRAVENOUS; SUBCUTANEOUS at 21:16

## 2023-04-23 RX ADMIN — DOCUSATE SODIUM 100 MG: 100 CAPSULE ORAL at 09:56

## 2023-04-23 RX ADMIN — BUSPIRONE HYDROCHLORIDE 7.5 MG: 5 TABLET ORAL at 09:56

## 2023-04-23 RX ADMIN — BUMETANIDE 3 MG: 1 TABLET ORAL at 09:56

## 2023-04-23 RX ADMIN — INSULIN GLARGINE 56 UNITS: 100 INJECTION, SOLUTION SUBCUTANEOUS at 21:16

## 2023-04-23 RX ADMIN — TICAGRELOR 90 MG: 90 TABLET ORAL at 09:56

## 2023-04-23 RX ADMIN — ACETAMINOPHEN 1000 MG: 500 TABLET ORAL at 14:36

## 2023-04-23 RX ADMIN — INSULIN GLARGINE 56 UNITS: 100 INJECTION, SOLUTION SUBCUTANEOUS at 09:57

## 2023-04-23 RX ADMIN — ACETAMINOPHEN 1000 MG: 500 TABLET ORAL at 06:11

## 2023-04-23 RX ADMIN — INSULIN LISPRO 15 UNITS: 100 INJECTION, SOLUTION INTRAVENOUS; SUBCUTANEOUS at 12:35

## 2023-04-23 RX ADMIN — OXYCODONE HYDROCHLORIDE 2.5 MG: 5 TABLET ORAL at 12:01

## 2023-04-23 RX ADMIN — OXYCODONE HYDROCHLORIDE 2.5 MG: 5 TABLET ORAL at 16:01

## 2023-04-23 RX ADMIN — HEPARIN SODIUM 5000 UNITS: 5000 INJECTION INTRAVENOUS; SUBCUTANEOUS at 14:36

## 2023-04-23 RX ADMIN — VENLAFAXINE HYDROCHLORIDE 37.5 MG: 37.5 CAPSULE, EXTENDED RELEASE ORAL at 11:18

## 2023-04-23 RX ADMIN — ATORVASTATIN CALCIUM 80 MG: 80 TABLET, FILM COATED ORAL at 21:15

## 2023-04-23 RX ADMIN — BUSPIRONE HYDROCHLORIDE 7.5 MG: 5 TABLET ORAL at 21:15

## 2023-04-23 RX ADMIN — OXYCODONE HYDROCHLORIDE 2.5 MG: 5 TABLET ORAL at 21:15

## 2023-04-23 RX ADMIN — BUSPIRONE HYDROCHLORIDE 7.5 MG: 5 TABLET ORAL at 14:36

## 2023-04-23 RX ADMIN — SODIUM CHLORIDE, PRESERVATIVE FREE 10 ML: 5 INJECTION INTRAVENOUS at 21:17

## 2023-04-23 RX ADMIN — BUMETANIDE 3 MG: 1 TABLET ORAL at 16:02

## 2023-04-23 RX ADMIN — ALLOPURINOL 100 MG: 100 TABLET ORAL at 09:56

## 2023-04-23 ASSESSMENT — PAIN SCALES - GENERAL
PAINLEVEL_OUTOF10: 5
PAINLEVEL_OUTOF10: 6
PAINLEVEL_OUTOF10: 5
PAINLEVEL_OUTOF10: 6
PAINLEVEL_OUTOF10: 1

## 2023-04-24 LAB
ANION GAP SERPL CALCULATED.3IONS-SCNC: 15 MMOL/L (ref 9–17)
BUN SERPL-MCNC: 38 MG/DL (ref 8–23)
CALCIUM SERPL-MCNC: 8.8 MG/DL (ref 8.6–10.4)
CHLORIDE SERPL-SCNC: 104 MMOL/L (ref 98–107)
CO2 SERPL-SCNC: 22 MMOL/L (ref 20–31)
CREAT SERPL-MCNC: 2.93 MG/DL (ref 0.5–0.9)
GFR SERPL CREATININE-BSD FRML MDRD: 17 ML/MIN/1.73M2
GLUCOSE BLD-MCNC: 131 MG/DL (ref 65–105)
GLUCOSE BLD-MCNC: 149 MG/DL (ref 65–105)
GLUCOSE BLD-MCNC: 158 MG/DL (ref 65–105)
GLUCOSE BLD-MCNC: 271 MG/DL (ref 65–105)
GLUCOSE BLD-MCNC: 85 MG/DL (ref 65–105)
GLUCOSE BLD-MCNC: 86 MG/DL (ref 65–105)
GLUCOSE SERPL-MCNC: 84 MG/DL (ref 70–99)
POTASSIUM SERPL-SCNC: 4.2 MMOL/L (ref 3.7–5.3)
SODIUM SERPL-SCNC: 141 MMOL/L (ref 135–144)

## 2023-04-24 PROCEDURE — 90935 HEMODIALYSIS ONE EVALUATION: CPT

## 2023-04-24 PROCEDURE — 6370000000 HC RX 637 (ALT 250 FOR IP): Performed by: SURGERY

## 2023-04-24 PROCEDURE — 6370000000 HC RX 637 (ALT 250 FOR IP): Performed by: STUDENT IN AN ORGANIZED HEALTH CARE EDUCATION/TRAINING PROGRAM

## 2023-04-24 PROCEDURE — 2580000003 HC RX 258: Performed by: SURGERY

## 2023-04-24 PROCEDURE — 82947 ASSAY GLUCOSE BLOOD QUANT: CPT

## 2023-04-24 PROCEDURE — 1200000000 HC SEMI PRIVATE

## 2023-04-24 PROCEDURE — 80048 BASIC METABOLIC PNL TOTAL CA: CPT

## 2023-04-24 PROCEDURE — 6370000000 HC RX 637 (ALT 250 FOR IP)

## 2023-04-24 PROCEDURE — 90935 HEMODIALYSIS ONE EVALUATION: CPT | Performed by: INTERNAL MEDICINE

## 2023-04-24 PROCEDURE — 6360000002 HC RX W HCPCS: Performed by: INTERNAL MEDICINE

## 2023-04-24 PROCEDURE — 6370000000 HC RX 637 (ALT 250 FOR IP): Performed by: NURSE PRACTITIONER

## 2023-04-24 PROCEDURE — 6360000002 HC RX W HCPCS: Performed by: SURGERY

## 2023-04-24 PROCEDURE — 36415 COLL VENOUS BLD VENIPUNCTURE: CPT

## 2023-04-24 RX ORDER — POLYETHYLENE GLYCOL 3350 17 G/17G
17 POWDER, FOR SOLUTION ORAL DAILY
Status: DISCONTINUED | OUTPATIENT
Start: 2023-04-24 | End: 2023-05-01 | Stop reason: HOSPADM

## 2023-04-24 RX ADMIN — Medication 10 MG: at 21:42

## 2023-04-24 RX ADMIN — OXYCODONE HYDROCHLORIDE 2.5 MG: 5 TABLET ORAL at 13:42

## 2023-04-24 RX ADMIN — CARVEDILOL 3.12 MG: 3.12 TABLET, FILM COATED ORAL at 21:41

## 2023-04-24 RX ADMIN — POLYETHYLENE GLYCOL 3350 17 G: 17 POWDER, FOR SOLUTION ORAL at 13:54

## 2023-04-24 RX ADMIN — INSULIN GLARGINE 56 UNITS: 100 INJECTION, SOLUTION SUBCUTANEOUS at 08:20

## 2023-04-24 RX ADMIN — HEPARIN SODIUM 1900 UNITS: 1000 INJECTION INTRAVENOUS; SUBCUTANEOUS at 13:00

## 2023-04-24 RX ADMIN — SODIUM BICARBONATE 1300 MG: 648 TABLET ORAL at 08:20

## 2023-04-24 RX ADMIN — BUMETANIDE 3 MG: 1 TABLET ORAL at 13:40

## 2023-04-24 RX ADMIN — INSULIN LISPRO 5 UNITS: 100 INJECTION, SOLUTION INTRAVENOUS; SUBCUTANEOUS at 18:04

## 2023-04-24 RX ADMIN — DOCUSATE SODIUM 100 MG: 100 CAPSULE ORAL at 08:20

## 2023-04-24 RX ADMIN — INSULIN LISPRO 15 UNITS: 100 INJECTION, SOLUTION INTRAVENOUS; SUBCUTANEOUS at 14:47

## 2023-04-24 RX ADMIN — HEPARIN SODIUM 5000 UNITS: 5000 INJECTION INTRAVENOUS; SUBCUTANEOUS at 05:47

## 2023-04-24 RX ADMIN — INSULIN GLARGINE 56 UNITS: 100 INJECTION, SOLUTION SUBCUTANEOUS at 22:04

## 2023-04-24 RX ADMIN — HEPARIN SODIUM 1900 UNITS: 1000 INJECTION INTRAVENOUS; SUBCUTANEOUS at 12:59

## 2023-04-24 RX ADMIN — ATORVASTATIN CALCIUM 80 MG: 80 TABLET, FILM COATED ORAL at 21:41

## 2023-04-24 RX ADMIN — HEPARIN SODIUM 5000 UNITS: 5000 INJECTION INTRAVENOUS; SUBCUTANEOUS at 21:42

## 2023-04-24 RX ADMIN — ACETAMINOPHEN 1000 MG: 500 TABLET ORAL at 21:46

## 2023-04-24 RX ADMIN — ACETAMINOPHEN 1000 MG: 500 TABLET ORAL at 13:42

## 2023-04-24 RX ADMIN — ALLOPURINOL 100 MG: 100 TABLET ORAL at 08:20

## 2023-04-24 RX ADMIN — CARVEDILOL 3.12 MG: 3.12 TABLET, FILM COATED ORAL at 13:40

## 2023-04-24 RX ADMIN — BUSPIRONE HYDROCHLORIDE 7.5 MG: 5 TABLET ORAL at 08:20

## 2023-04-24 RX ADMIN — OXYCODONE HYDROCHLORIDE 2.5 MG: 5 TABLET ORAL at 18:08

## 2023-04-24 RX ADMIN — VENLAFAXINE HYDROCHLORIDE 37.5 MG: 37.5 CAPSULE, EXTENDED RELEASE ORAL at 08:20

## 2023-04-24 RX ADMIN — SODIUM CHLORIDE, PRESERVATIVE FREE 10 ML: 5 INJECTION INTRAVENOUS at 21:48

## 2023-04-24 RX ADMIN — TICAGRELOR 90 MG: 90 TABLET ORAL at 21:41

## 2023-04-24 RX ADMIN — BUSPIRONE HYDROCHLORIDE 7.5 MG: 5 TABLET ORAL at 21:41

## 2023-04-24 RX ADMIN — BUSPIRONE HYDROCHLORIDE 7.5 MG: 5 TABLET ORAL at 13:41

## 2023-04-24 RX ADMIN — INSULIN LISPRO 15 UNITS: 100 INJECTION, SOLUTION INTRAVENOUS; SUBCUTANEOUS at 18:07

## 2023-04-24 RX ADMIN — TICAGRELOR 90 MG: 90 TABLET ORAL at 08:20

## 2023-04-24 RX ADMIN — OXYCODONE HYDROCHLORIDE 2.5 MG: 5 TABLET ORAL at 21:41

## 2023-04-24 RX ADMIN — SODIUM BICARBONATE 1300 MG: 648 TABLET ORAL at 21:41

## 2023-04-24 RX ADMIN — OXYCODONE HYDROCHLORIDE 2.5 MG: 5 TABLET ORAL at 05:49

## 2023-04-24 RX ADMIN — ACETAMINOPHEN 1000 MG: 500 TABLET ORAL at 05:47

## 2023-04-24 ASSESSMENT — PAIN SCALES - GENERAL
PAINLEVEL_OUTOF10: 6
PAINLEVEL_OUTOF10: 0
PAINLEVEL_OUTOF10: 6
PAINLEVEL_OUTOF10: 0
PAINLEVEL_OUTOF10: 6
PAINLEVEL_OUTOF10: 6
PAINLEVEL_OUTOF10: 7
PAINLEVEL_OUTOF10: 7

## 2023-04-25 LAB
ANION GAP SERPL CALCULATED.3IONS-SCNC: 12 MMOL/L (ref 9–17)
BUN SERPL-MCNC: 21 MG/DL (ref 8–23)
CALCIUM SERPL-MCNC: 8.2 MG/DL (ref 8.6–10.4)
CHLORIDE SERPL-SCNC: 102 MMOL/L (ref 98–107)
CO2 SERPL-SCNC: 27 MMOL/L (ref 20–31)
CREAT SERPL-MCNC: 2.2 MG/DL (ref 0.5–0.9)
GFR SERPL CREATININE-BSD FRML MDRD: 24 ML/MIN/1.73M2
GLUCOSE BLD-MCNC: 112 MG/DL (ref 65–105)
GLUCOSE BLD-MCNC: 113 MG/DL (ref 65–105)
GLUCOSE BLD-MCNC: 151 MG/DL (ref 65–105)
GLUCOSE BLD-MCNC: 167 MG/DL (ref 65–105)
GLUCOSE BLD-MCNC: 188 MG/DL (ref 65–105)
GLUCOSE BLD-MCNC: 201 MG/DL (ref 65–105)
GLUCOSE SERPL-MCNC: 111 MG/DL (ref 70–99)
POTASSIUM SERPL-SCNC: 4.6 MMOL/L (ref 3.7–5.3)
SODIUM SERPL-SCNC: 141 MMOL/L (ref 135–144)

## 2023-04-25 PROCEDURE — 6370000000 HC RX 637 (ALT 250 FOR IP): Performed by: SURGERY

## 2023-04-25 PROCEDURE — 97110 THERAPEUTIC EXERCISES: CPT

## 2023-04-25 PROCEDURE — 1200000000 HC SEMI PRIVATE

## 2023-04-25 PROCEDURE — 6370000000 HC RX 637 (ALT 250 FOR IP): Performed by: NURSE PRACTITIONER

## 2023-04-25 PROCEDURE — 2580000003 HC RX 258: Performed by: SURGERY

## 2023-04-25 PROCEDURE — 36415 COLL VENOUS BLD VENIPUNCTURE: CPT

## 2023-04-25 PROCEDURE — 97530 THERAPEUTIC ACTIVITIES: CPT

## 2023-04-25 PROCEDURE — 6360000002 HC RX W HCPCS: Performed by: SURGERY

## 2023-04-25 PROCEDURE — 6370000000 HC RX 637 (ALT 250 FOR IP)

## 2023-04-25 PROCEDURE — 6370000000 HC RX 637 (ALT 250 FOR IP): Performed by: STUDENT IN AN ORGANIZED HEALTH CARE EDUCATION/TRAINING PROGRAM

## 2023-04-25 PROCEDURE — 82947 ASSAY GLUCOSE BLOOD QUANT: CPT

## 2023-04-25 PROCEDURE — 80048 BASIC METABOLIC PNL TOTAL CA: CPT

## 2023-04-25 PROCEDURE — 99231 SBSQ HOSP IP/OBS SF/LOW 25: CPT | Performed by: INTERNAL MEDICINE

## 2023-04-25 RX ORDER — INSULIN LISPRO 100 [IU]/ML
0-16 INJECTION, SOLUTION INTRAVENOUS; SUBCUTANEOUS EVERY 6 HOURS
Status: DISCONTINUED | OUTPATIENT
Start: 2023-04-26 | End: 2023-05-01 | Stop reason: HOSPADM

## 2023-04-25 RX ADMIN — INSULIN LISPRO 15 UNITS: 100 INJECTION, SOLUTION INTRAVENOUS; SUBCUTANEOUS at 12:15

## 2023-04-25 RX ADMIN — DOCUSATE SODIUM 100 MG: 100 CAPSULE ORAL at 09:17

## 2023-04-25 RX ADMIN — INSULIN GLARGINE 56 UNITS: 100 INJECTION, SOLUTION SUBCUTANEOUS at 09:13

## 2023-04-25 RX ADMIN — OXYCODONE HYDROCHLORIDE 2.5 MG: 5 TABLET ORAL at 22:09

## 2023-04-25 RX ADMIN — BUSPIRONE HYDROCHLORIDE 7.5 MG: 5 TABLET ORAL at 14:11

## 2023-04-25 RX ADMIN — TICAGRELOR 90 MG: 90 TABLET ORAL at 21:03

## 2023-04-25 RX ADMIN — HEPARIN SODIUM 5000 UNITS: 5000 INJECTION INTRAVENOUS; SUBCUTANEOUS at 15:08

## 2023-04-25 RX ADMIN — BUSPIRONE HYDROCHLORIDE 7.5 MG: 5 TABLET ORAL at 21:03

## 2023-04-25 RX ADMIN — INSULIN LISPRO 15 UNITS: 100 INJECTION, SOLUTION INTRAVENOUS; SUBCUTANEOUS at 17:31

## 2023-04-25 RX ADMIN — CARVEDILOL 3.12 MG: 3.12 TABLET, FILM COATED ORAL at 21:03

## 2023-04-25 RX ADMIN — INSULIN LISPRO 8 UNITS: 100 INJECTION, SOLUTION INTRAVENOUS; SUBCUTANEOUS at 12:16

## 2023-04-25 RX ADMIN — BUMETANIDE 3 MG: 1 TABLET ORAL at 09:17

## 2023-04-25 RX ADMIN — VENLAFAXINE HYDROCHLORIDE 37.5 MG: 37.5 CAPSULE, EXTENDED RELEASE ORAL at 09:18

## 2023-04-25 RX ADMIN — Medication 10 MG: at 22:09

## 2023-04-25 RX ADMIN — ACETAMINOPHEN 1000 MG: 500 TABLET ORAL at 22:19

## 2023-04-25 RX ADMIN — INSULIN LISPRO 15 UNITS: 100 INJECTION, SOLUTION INTRAVENOUS; SUBCUTANEOUS at 09:14

## 2023-04-25 RX ADMIN — TICAGRELOR 90 MG: 90 TABLET ORAL at 09:17

## 2023-04-25 RX ADMIN — SODIUM BICARBONATE 1300 MG: 648 TABLET ORAL at 21:03

## 2023-04-25 RX ADMIN — SODIUM BICARBONATE 1300 MG: 648 TABLET ORAL at 09:19

## 2023-04-25 RX ADMIN — INSULIN GLARGINE 56 UNITS: 100 INJECTION, SOLUTION SUBCUTANEOUS at 22:19

## 2023-04-25 RX ADMIN — ALLOPURINOL 100 MG: 100 TABLET ORAL at 09:17

## 2023-04-25 RX ADMIN — INSULIN LISPRO 4 UNITS: 100 INJECTION, SOLUTION INTRAVENOUS; SUBCUTANEOUS at 00:47

## 2023-04-25 RX ADMIN — SODIUM CHLORIDE, PRESERVATIVE FREE 10 ML: 5 INJECTION INTRAVENOUS at 22:12

## 2023-04-25 RX ADMIN — SODIUM CHLORIDE, PRESERVATIVE FREE 10 ML: 5 INJECTION INTRAVENOUS at 09:23

## 2023-04-25 RX ADMIN — ACETAMINOPHEN 1000 MG: 500 TABLET ORAL at 06:00

## 2023-04-25 RX ADMIN — BUSPIRONE HYDROCHLORIDE 7.5 MG: 5 TABLET ORAL at 09:16

## 2023-04-25 RX ADMIN — ACETAMINOPHEN 1000 MG: 500 TABLET ORAL at 14:10

## 2023-04-25 RX ADMIN — POLYETHYLENE GLYCOL 3350 17 G: 17 POWDER, FOR SOLUTION ORAL at 09:16

## 2023-04-25 RX ADMIN — HEPARIN SODIUM 5000 UNITS: 5000 INJECTION INTRAVENOUS; SUBCUTANEOUS at 06:00

## 2023-04-25 RX ADMIN — HEPARIN SODIUM 5000 UNITS: 5000 INJECTION INTRAVENOUS; SUBCUTANEOUS at 21:02

## 2023-04-25 RX ADMIN — OXYCODONE HYDROCHLORIDE 2.5 MG: 5 TABLET ORAL at 14:11

## 2023-04-25 RX ADMIN — ATORVASTATIN CALCIUM 80 MG: 80 TABLET, FILM COATED ORAL at 21:03

## 2023-04-25 ASSESSMENT — PAIN SCALES - GENERAL
PAINLEVEL_OUTOF10: 7
PAINLEVEL_OUTOF10: 5
PAINLEVEL_OUTOF10: 5

## 2023-04-26 ENCOUNTER — APPOINTMENT (OUTPATIENT)
Dept: DIALYSIS | Age: 63
DRG: 252 | End: 2023-04-26
Attending: SURGERY
Payer: COMMERCIAL

## 2023-04-26 LAB
ANION GAP SERPL CALCULATED.3IONS-SCNC: 13 MMOL/L (ref 9–17)
BUN SERPL-MCNC: 33 MG/DL (ref 8–23)
CALCIUM SERPL-MCNC: 9.1 MG/DL (ref 8.6–10.4)
CHLORIDE SERPL-SCNC: 104 MMOL/L (ref 98–107)
CO2 SERPL-SCNC: 24 MMOL/L (ref 20–31)
CREAT SERPL-MCNC: 2.87 MG/DL (ref 0.5–0.9)
GFR SERPL CREATININE-BSD FRML MDRD: 18 ML/MIN/1.73M2
GLUCOSE BLD-MCNC: 145 MG/DL (ref 65–105)
GLUCOSE BLD-MCNC: 153 MG/DL (ref 65–105)
GLUCOSE BLD-MCNC: 169 MG/DL (ref 65–105)
GLUCOSE BLD-MCNC: 192 MG/DL (ref 65–105)
GLUCOSE BLD-MCNC: 244 MG/DL (ref 65–105)
GLUCOSE SERPL-MCNC: 158 MG/DL (ref 70–99)
POTASSIUM SERPL-SCNC: 4.3 MMOL/L (ref 3.7–5.3)
SODIUM SERPL-SCNC: 141 MMOL/L (ref 135–144)

## 2023-04-26 PROCEDURE — 36415 COLL VENOUS BLD VENIPUNCTURE: CPT

## 2023-04-26 PROCEDURE — 6370000000 HC RX 637 (ALT 250 FOR IP)

## 2023-04-26 PROCEDURE — 6370000000 HC RX 637 (ALT 250 FOR IP): Performed by: STUDENT IN AN ORGANIZED HEALTH CARE EDUCATION/TRAINING PROGRAM

## 2023-04-26 PROCEDURE — 90935 HEMODIALYSIS ONE EVALUATION: CPT

## 2023-04-26 PROCEDURE — 6370000000 HC RX 637 (ALT 250 FOR IP): Performed by: SURGERY

## 2023-04-26 PROCEDURE — 6360000002 HC RX W HCPCS: Performed by: INTERNAL MEDICINE

## 2023-04-26 PROCEDURE — 2580000003 HC RX 258: Performed by: SURGERY

## 2023-04-26 PROCEDURE — 90935 HEMODIALYSIS ONE EVALUATION: CPT | Performed by: INTERNAL MEDICINE

## 2023-04-26 PROCEDURE — 6360000002 HC RX W HCPCS: Performed by: SURGERY

## 2023-04-26 PROCEDURE — 82947 ASSAY GLUCOSE BLOOD QUANT: CPT

## 2023-04-26 PROCEDURE — 6370000000 HC RX 637 (ALT 250 FOR IP): Performed by: NURSE PRACTITIONER

## 2023-04-26 PROCEDURE — 80048 BASIC METABOLIC PNL TOTAL CA: CPT

## 2023-04-26 PROCEDURE — 1200000000 HC SEMI PRIVATE

## 2023-04-26 RX ORDER — BISACODYL 10 MG
10 SUPPOSITORY, RECTAL RECTAL DAILY PRN
Status: DISCONTINUED | OUTPATIENT
Start: 2023-04-26 | End: 2023-05-01 | Stop reason: HOSPADM

## 2023-04-26 RX ADMIN — BUSPIRONE HYDROCHLORIDE 7.5 MG: 5 TABLET ORAL at 21:13

## 2023-04-26 RX ADMIN — INSULIN LISPRO 15 UNITS: 100 INJECTION, SOLUTION INTRAVENOUS; SUBCUTANEOUS at 08:27

## 2023-04-26 RX ADMIN — HEPARIN SODIUM 1900 UNITS: 1000 INJECTION INTRAVENOUS; SUBCUTANEOUS at 12:29

## 2023-04-26 RX ADMIN — SODIUM BICARBONATE 1300 MG: 648 TABLET ORAL at 08:15

## 2023-04-26 RX ADMIN — INSULIN GLARGINE 56 UNITS: 100 INJECTION, SOLUTION SUBCUTANEOUS at 21:43

## 2023-04-26 RX ADMIN — TICAGRELOR 90 MG: 90 TABLET ORAL at 21:12

## 2023-04-26 RX ADMIN — HEPARIN SODIUM 5000 UNITS: 5000 INJECTION INTRAVENOUS; SUBCUTANEOUS at 21:13

## 2023-04-26 RX ADMIN — INSULIN LISPRO 15 UNITS: 100 INJECTION, SOLUTION INTRAVENOUS; SUBCUTANEOUS at 18:07

## 2023-04-26 RX ADMIN — VENLAFAXINE HYDROCHLORIDE 37.5 MG: 37.5 CAPSULE, EXTENDED RELEASE ORAL at 14:32

## 2023-04-26 RX ADMIN — Medication 10 MG: at 21:22

## 2023-04-26 RX ADMIN — ACETAMINOPHEN 1000 MG: 500 TABLET ORAL at 21:13

## 2023-04-26 RX ADMIN — INSULIN LISPRO 4 UNITS: 100 INJECTION, SOLUTION INTRAVENOUS; SUBCUTANEOUS at 02:13

## 2023-04-26 RX ADMIN — ATORVASTATIN CALCIUM 80 MG: 80 TABLET, FILM COATED ORAL at 21:13

## 2023-04-26 RX ADMIN — ACETAMINOPHEN 1000 MG: 500 TABLET ORAL at 06:43

## 2023-04-26 RX ADMIN — INSULIN GLARGINE 56 UNITS: 100 INJECTION, SOLUTION SUBCUTANEOUS at 08:26

## 2023-04-26 RX ADMIN — HEPARIN SODIUM 5000 UNITS: 5000 INJECTION INTRAVENOUS; SUBCUTANEOUS at 06:43

## 2023-04-26 RX ADMIN — MAGNESIUM HYDROXIDE 30 ML: 400 SUSPENSION ORAL at 21:12

## 2023-04-26 RX ADMIN — CARVEDILOL 3.12 MG: 3.12 TABLET, FILM COATED ORAL at 21:13

## 2023-04-26 RX ADMIN — INSULIN LISPRO 15 UNITS: 100 INJECTION, SOLUTION INTRAVENOUS; SUBCUTANEOUS at 14:32

## 2023-04-26 RX ADMIN — CARVEDILOL 3.12 MG: 3.12 TABLET, FILM COATED ORAL at 14:31

## 2023-04-26 RX ADMIN — BUSPIRONE HYDROCHLORIDE 7.5 MG: 5 TABLET ORAL at 14:31

## 2023-04-26 RX ADMIN — SODIUM CHLORIDE, PRESERVATIVE FREE 10 ML: 5 INJECTION INTRAVENOUS at 21:13

## 2023-04-26 RX ADMIN — ALLOPURINOL 100 MG: 100 TABLET ORAL at 08:15

## 2023-04-26 RX ADMIN — OXYCODONE HYDROCHLORIDE 2.5 MG: 5 TABLET ORAL at 08:15

## 2023-04-26 RX ADMIN — BUSPIRONE HYDROCHLORIDE 7.5 MG: 5 TABLET ORAL at 08:15

## 2023-04-26 RX ADMIN — INSULIN LISPRO 8 UNITS: 100 INJECTION, SOLUTION INTRAVENOUS; SUBCUTANEOUS at 21:44

## 2023-04-26 RX ADMIN — TICAGRELOR 90 MG: 90 TABLET ORAL at 08:15

## 2023-04-26 RX ADMIN — DOCUSATE SODIUM 100 MG: 100 CAPSULE ORAL at 08:15

## 2023-04-26 RX ADMIN — SODIUM BICARBONATE 1300 MG: 648 TABLET ORAL at 21:13

## 2023-04-26 RX ADMIN — POLYETHYLENE GLYCOL 3350 17 G: 17 POWDER, FOR SOLUTION ORAL at 14:32

## 2023-04-26 RX ADMIN — ACETAMINOPHEN 1000 MG: 500 TABLET ORAL at 14:31

## 2023-04-26 RX ADMIN — BUMETANIDE 3 MG: 1 TABLET ORAL at 14:31

## 2023-04-26 ASSESSMENT — PAIN SCALES - GENERAL
PAINLEVEL_OUTOF10: 0
PAINLEVEL_OUTOF10: 1

## 2023-04-26 NOTE — PROGRESS NOTES
03/22/23 1024   Encounter Summary   Encounter Overview/Reason  Spiritual/Emotional Needs   Support System Hindu/mary anne community   Plan and Referrals   Plan/Referrals Contacted support as requested per patient/family request Plan  ([address])
03/23/23 1416   Encounter Summary   Encounter Overview/Reason  Volunteer Encounter   Service Provided For: Patient   Referral/Consult From: Nhi   Last Encounter  03/23/23  (V)   Complexity of Encounter Low   Begin Time 0100   End Time  0115   Total Time Calculated 15 min   Spiritual/Emotional needs   Type Spiritual Support   Rituals, Rites and Sacraments   Type Jehovah's witness Communion   Assessment/Intervention/Outcome   Intervention Prayer (assurance of)/[place]
03/26/23 1210   Encounter Summary   Encounter Overview/Reason   Encounter   Service Provided For: Patient   Referral/Consult From: Rounding   Last Encounter  03/26/23   Spiritual/Emotional needs   Type Spiritual Support   Rituals, Rites and Sacraments   Type Anointing  (Fr Morrison 3/26/23)
[address] [facility] [address]  Family Medicine        Progress Note      Date:   3/25/2023  Patient name:  Flores Leach  Date of admission:  3/21/2023  6:48 PM  MRN:   [id number]  YOB: 1960        Brief HPI    Patient admitted for chest pain and thoracic back pain, echo normal    ASSESSMENT/PLAN     Hospital Problems             Last Modified POA    * (Principal) Atypical chest pain 3/22/2023 Yes    Depression with anxiety 3/22/2023 Yes    ESRD on hemodialysis ([address] 3/22/2023 Yes    Type 2 diabetes mellitus with hyperglycemia, with long-term current use of insulin ([address] 3/22/2023 Yes    Congestive heart failure ([address] 3/22/2023 Yes    Chronic respiratory failure with hypoxia ([address] 3/22/2023 Yes    Diabetic polyneuropathy associated with type 2 diabetes mellitus ([address] 3/22/2023 Yes    Acute thoracic back pain 3/22/2023 Yes       Cardiac function is preserved, troponins are flat  Continue Robaxin  Dialysis yesterday  Lantus 57 units twice daily with sliding scale  5. Continue Coreg, Bumex, Brilinta and Lipitor  6. Maintain O2 saturation above 88%, nasal cannula  7. Continue BuSpar 3 times daily  8. Start Effexor  9. OK to DC once placement approved    SUBJECTIVE:     Patient was seen and examined at bedside. Pt feels better today without any new concerned. Chest pressure is persistent but has not worsened. Back pain has improved. Patient denied SOB, fever, chills, nausea, vomiting or diarrhea. Patient denied any new complaints or concerns. Consultant notes, labs & imaging reviewed. Case was discussed with nursing staff. Review of Systems   Constitutional:  Negative for chills, diaphoresis and fever. HENT:  Negative for congestion, rhinorrhea and sore throat. Eyes: Negative. Respiratory:  Negative for cough, shortness of breath and wheezing.     Cardiovascular:  Negative for palpitations and leg swelling.        +substernal chest pressure, chronic   Gastrointestinal:  Negative
[address]   Family Medicine        Progress Note      Date:   3/26/2023  Patient name:  Francisco Farris  Date of admission:  3/21/2023  6:48 PM  MRN:   [id number]  YOB: 1960        Brief HPI    Patient admitted for chest pain and thoracic back pain, echo normal    ASSESSMENT/PLAN     Hospital Problems             Last Modified POA    * (Principal) Atypical chest pain 3/22/2023 Yes    Depression with anxiety 3/22/2023 Yes    ESRD on hemodialysis ([address] 3/22/2023 Yes    Type 2 diabetes mellitus with hyperglycemia, with long-term current use of insulin ([address] 3/22/2023 Yes    Congestive heart failure ([address] 3/22/2023 Yes    Chronic respiratory failure with hypoxia ([address] 3/22/2023 Yes    Diabetic polyneuropathy associated with type 2 diabetes mellitus ([address] 3/22/2023 Yes    Acute thoracic back pain 3/22/2023 Yes       Continue Robaxin  Increase Lantus to 58 units twice daily with sliding scale  5. Continue Coreg, Bumex, Brilinta and Lipitor  6. Maintain O2 saturation above 88%, nasal cannula  7. Continue BuSpar 3 times daily  8. Continue Effexor  9. Awaiting placement    SUBJECTIVE:     Patient was seen and examined at bedside. Pt feels better today without any new concerned. Chest pressure is persistent but has not worsened. Patient denied SOB, fever, chills, nausea, vomiting or diarrhea. Patient denied any new complaints or concerns. Consultant notes, labs & imaging reviewed. Review of Systems   Constitutional:  Negative for chills, diaphoresis and fever. Eyes: Negative. Respiratory:  Negative for shortness of breath. Cardiovascular:         +substernal chest pressure, chronic   Gastrointestinal:  Negative for abdominal pain, diarrhea, nausea and vomiting. Genitourinary:  Negative for difficulty urinating and dysuria. Neurological:  Negative for headaches.        OBJECTIVE:     BP (!) 107/32   Pulse 60   Temp 98.5 °F (36.9 °C)   Resp 16   Ht 5' 5\" (1.651 m)
[facility]   Progress Note                   Date:   3/23/2023  Patient name: Yvette Spence  Date of admission:  3/21/2023  6:48 PM  MRN:   [id number]  YOB: 1960  PCP: AFSANEH Urrutia CNP    Reason for Admission:    Subjective:       Clinical Changes / Abnormalities: Patient still have some constant pain in the sternal sternotomy area and below it and she showed me in that there is a bulge  Patient is a limited echo done which is normal as below      Medications:   Scheduled Meds:   sodium chloride flush  5-40 mL IntraVENous 2 times per day    heparin (porcine)  5,000 Units SubCUTAneous 3 times per day    aspirin  81 mg Oral Daily    atorvastatin  80 mg Oral Nightly    bumetanide  3 mg Oral BID    busPIRone  7.5 mg Oral TID    carvedilol  3.125 mg Oral BID    docusate sodium  100 mg Oral BID    linaclotide  145 mcg Oral QAM AC    pantoprazole  40 mg Oral QAM AC    tamsulosin  0.4 mg Oral Daily    ticagrelor  90 mg Oral BID    insulin lispro  0-8 Units SubCUTAneous TID WC    insulin lispro  0-4 Units SubCUTAneous Nightly    insulin glargine  57 Units SubCUTAneous BID    methocarbamol  750 mg Oral 4x Daily     Continuous Infusions:   sodium chloride      dextrose      dextrose       CBC:   Recent Labs     03/21/23 2005 03/23/23  0603   WBC 7.4 5.8   HGB 10.3* 10.5*    187     BMP:    Recent Labs     03/21/23 2005 03/22/23  0825 03/23/23  0603    141 139   K 3.5* 3.9 4.0    102 100   CO2 28 27 27   BUN 23 24* 15   CREATININE 2.51* 2.87* 2.43*   GLUCOSE 190* 357* 172*     Hepatic:   Recent Labs     03/23/23  0603   AST 12   ALT 7   BILITOT 1.2   ALKPHOS 121*     Troponin: No results for input(s): TROPONINI in the last 72 hours. BNP: No results for input(s): BNP in the last 72 hours. Lipids: No results for input(s): CHOL, HDL in the last 72 hours. Invalid input(s): LDLCALCU  INR: No results for input(s): INR in the last 72 hours.     Objective:   Vitals: BP
Dom 167   OCCUPATIONAL THERAPY MISSED TREATMENT NOTE   INPATIENT   Date: 3/22/23  Patient Name: Piotr Wheatley       Room: 8609/7131-75  MRN: [id number]   Account #: [de-identified]    : 1960  (70 y.o.)  Gender: female     REASON FOR MISSED TREATMENT:  Patient at testing and/or off the floor   -   Hemodialysis 1003  Per chart/RN report, pt is to have STAT CT of thoracic spine after HD, OT will await results of CT and MD recs prior to completing evaluation.     Adan Fletcher, OTR/L
Dr. Carla Ojeda ok for evening dose of Bumex to be re-timed to 1700 or 1800. Pt refused evening dose last night, reporting difficulties sleeping due to urine frequency at night. Pharmacy notified of change.     Janett Ortiz RN
Dr. Chelly Weaver unable to do a paper script for  methocarbamol since he is gone for the day.
Dr. Shaq Mcdonough notified of patient's increased shortness of breath, is now hoarse when talking, and states her chest feels heavy. Pt currently on 4 L nasal cannula. Dr. Shaq Mcdonough notified that patient had patchy ground-glass changes in her CT and right-sided pleural effusions. New orders to check troponin, chest xray and to covid swab patient.
Dr. Verner Gable notified of patient's troponin level of 105. New orders obtained for 20 mg Iv Lasix.
HEMODIALYSIS POST TREATMENT NOTE    Treatment time ordered: 210    Actual treatment time: 202    UltraFiltration Goal: 1900  UltraFiltration Removed: 1900      Pre Treatment weight: 106.2  Post Treatment weight: 104.3  Estimated Dry Weight: na    Access used:     Central Venous Catheter:          Tunneled or Non-tunneled: tunneled           Site: right chest          Access Flow: good      Internal Access:       AV Fistula or AV Graft: na         Site: na       Access Flow: na       Sign and symptoms of infection: no       If YES: na    Medications or blood products given: no    Chronic outpatient schedule: [facility]    Chronic outpatient unit: [facility]    Summary of response to treatment: the pt did well on treatment. She came off 8 mins early do to dialyzer clotting    Explain if orders NOT met, was physician notified:trinh      ACES flowsheet faxed to patient unit/ placed in patient chart: yes    Post assessment completed: yes By Jonh Madrid    Report given to: [facility] documented Safety Checks include the followin) Access and face visible at all times. 2) All connections and blood lines are secure with no kinks. 3) NVL alarm engaged. 4) Hemosafe device applied (if applicable). 5) No collapse of Arterial or Venous blood chambers. 6) All blood lines / pump segments in the air detectors.
HEMODIALYSIS POST TREATMENT NOTE    Treatment time ordered: 3.5Hrs    Actual treatment time: 3.5Hrs    UltraFiltration Goal: 1.5Kgs  UltraFiltration Removed: 1.5Kgs      Pre Treatment weight: 99.7Kgs  Post Treatment weight: 98.4Kgs  Estimated Dry Weight: 98.4Kgs (New EDW per Dr. Shannon Davila)    Access used:     Central Venous Catheter:          Tunneled or Non-tunneled: Tunneled           Site: right Chest          Access Flow: Good      Internal Access:       AV Fistula or AV Graft: N/A         Site: N/A       Access Flow: N/A       Sign and symptoms of infection: No       If YES: N/A    Medications or blood products given: See MAR    Chronic outpatient schedule: [facility]    Chronic outpatient unit: [facility]    Summary of response to treatment: Patient tolerated treatment good. 1.0Kgs of fluid removed with out difficulty. Patient post weight today is patients new EDW per Dr. Shannon Davila. 98.4Kgs. CVC clamped and capped    Explain if orders NOT met, was physician notified:N/A      ACES flowsheet faxed to patient unit/ placed in patient chart: Yes    Post assessment completed: Juan Francisco Signs    Report given to: Yaquelin Khan documented Safety Checks include the followin) Access and face visible at all times. 2) All connections and blood lines are secure with no kinks. 3) NVL alarm engaged. 4) Hemosafe device applied (if applicable). 5) No collapse of Arterial or Venous blood chambers. 6) All blood lines / pump segments in the air detectors.
HEMODIALYSIS POST TREATMENT NOTE    Treatment time ordered: 3.5Hrs    Actual treatment time: 3.5Hrs    UltraFiltration Goal: 2.0Kgs  UltraFiltration Removed: 2.0Kgs      Pre Treatment weight: 102.4Kgs  Post Treatment weight: 100.4Kgs  Estimated Dry Weight: N/A    Access used:     Central Venous Catheter:          Tunneled or Non-tunneled: Tunneled           Site: Right Chest          Access Flow: Good      Internal Access:       AV Fistula or AV Graft: N/A         Site: N/A       Access Flow: N/A       Sign and symptoms of infection: N/A       If YES: N/A    Medications or blood products given: See MAR    Chronic outpatient schedule: [facility]    Chronic outpatient unit: [facility]    Summary of response to treatment: Patient tolerated treatment good. 2.0Kgs of fluid removed without difficulty. CVC clamped and capped. Explain if orders NOT met, was physician notified:N/A      ACES flowsheet faxed to patient unit/ placed in patient chart: Yes    Post assessment completed: Sherley Montelongo    Report given to: Rosmery Beltrán documented Safety Checks include the followin) Access and face visible at all times. 2) All connections and blood lines are secure with no kinks. 3) NVL alarm engaged. 4) Hemosafe device applied (if applicable). 5) No collapse of Arterial or Venous blood chambers. 6) All blood lines / pump segments in the air detectors.
HEMODIALYSIS PRE-TREATMENT NOTE    Patient Identifiers prior to treatment: Name  MRN    Isolation Required:  Yes                      Isolation Type: Contact       (please document if patient is being managed as a PUI/COVID-19 patient)        Hepatitis status:                           Date Drawn                             Result  Hepatitis B Surface Antigen 23     NEG                     Hepatitis B Surface Antibody 22 POS     60.08   Hepatitis B Core Antibody 23 NEG          How was Hepatitis Status verified: Epic chart     Was a copy of the labs you documented provided to facility for the patient's chart: Yes    Hemodialysis orders verified: Yes by Christianne Swan    Access Within normal limits ( I.e. s/s of infection,...): WNL     Pre-Assessment completed: Yes by Christianne Swan    Pre-dialysis report received from: Andrew Evans                      Time: 09
HEMODIALYSIS PRE-TREATMENT NOTE    Patient Identifiers prior to treatment: Name  MRN    Isolation Required:  Yes                      Isolation Type: Contact       (please document if patient is being managed as a PUI/COVID-19 patient)        Hepatitis status:                           Date Drawn                             Result  Hepatitis B Surface Antigen 23     NEG                     Hepatitis B Surface Antibody 22 POS     60.08   Hepatitis B Core Antibody N/A N/A          How was Hepatitis Status verified: Epic Chart     Was a copy of the labs you documented provided to facility for the patient's chart: Yes    Hemodialysis orders verified: Yes by Piedad Bajwa    Access Within normal limits ( I.e. s/s of infection,...): WNL     Pre-Assessment completed: Yes by Piedad Bajwa    Pre-dialysis report received from: Bushra Reilly                       Time: 830
HEMODIALYSIS PRE-TREATMENT NOTE    Patient Identifiers prior to treatment: name, band and birthdate    Isolation Required: ESBL                      Isolation Type: contact       (please document if patient is being managed as a PUI/COVID-19 patient)        Hepatitis status:                           Date Drawn                             Result  Hepatitis B Surface Antigen 03/02/23 neg        Hepatitis B Surface Antibody 03/30/22 pos     60.08   Hepatitis B Core Antibody            How was Hepatitis Status verified: labs     Was a copy of the labs you documented provided to facility for the patient's chart: yes    Hemodialysis orders verified: yes    Access Within normal limits ( I.e. s/s of infection,...): yes     Pre-Assessment completed: yes by Amina Montes    Pre-dialysis report received from: [place]                      Time: 0900
Patient known to writer from previous admissions; patient anxious and tearful, writer called patient's sister Douglas  on patient's phone per patient's request; listening presence and support; welcomed prayer;     03/21/23 1945   Encounter Summary   Encounter Overview/Reason  Spiritual/Emotional Needs   Service Provided For: Patient and family together   Referral/Consult From: [facility] Family members   Last Encounter  03/21/23   Complexity of Encounter Moderate   Spiritual/Emotional needs   Type Spiritual Support   Assessment/Intervention/Outcome   Assessment Anxious; Coping; Hopeful;Impaired resilience; Powerlessness; Tearful   Intervention Active listening;Discussed illness injury and its impact; Explored/Affirmed feelings, thoughts, concerns;Prayer (assurance of)/[place];Sustaining Presence/Ministry of presence   Outcome Engaged in conversation;Expressed feelings, needs, and concerns;Comfort;Expressed Gratitude;Receptive
Patient provided medical update and talked about the medical care she has received since she arrived in the ED; write reminded patient we needed a copy of her HCPOA; facilitated communication between patient and case management per patient's request; listening presence and support;     03/24/23 4752   Encounter Summary   Encounter Overview/Reason  Spiritual/Emotional Needs   Service Provided For: Patient   Referral/Consult From: Rounding   Last Encounter  03/24/23   Complexity of Encounter Moderate   Spiritual/Emotional needs   Type Spiritual Support   Assessment/Intervention/Outcome   Assessment Coping; Hopeful;Impaired resilience; Powerlessness   Intervention Active listening;Discussed illness injury and its impact; Explored/Affirmed feelings, thoughts, concerns;Sustaining Presence/Ministry of presence   Outcome Comfort;Coping;Engaged in conversation;Expressed feelings, needs, and concerns;Expressed Gratitude;Receptive
Patient states she dialyzes at [facility] on MWF. Per Dr. Allyssa Spain new dry weight will be patient's post treatment weight today.
Physical Therapy          Physical Therapy Cancel Note      DATE: 3/24/2023    NAME: Cristhian Hanson  MRN: [id number]   : 1960      Patient not seen this date for Physical Therapy due to:    Patient is not appropriate for PT evaluation/treatment at this time d/t dyspnea per RN.       Electronically signed by Enma Perez PTA on 3/24/2023 at 1:54 PM
Physical Therapy        Physical Therapy Cancel Note      DATE: 3/22/2023    NAME: Piotr Wheatley  MRN: [id number]   : 1960      Patient not seen this date for Physical Therapy due to:    3-- Patient at [facility] 1003  Per chart/RN report, pt is to have STAT CT of thoracic spine after HD, PT will await results of CT and MD recs prior to completing evaluation.          Electronically signed by Arvind Diggs PT on 3/22/2023 at 12:31 PM
Physician Progress Note      PATIENT:               Beronica Shah  CSN #:                  [id number]  :                       1960  ADMIT DATE:       3/21/2023 6:48 PM  [address] DATE:  Patrice Murillo  PROVIDER #:        Christopher Fonseca MD          QUERY TEXT:    Pt admitted with Chest pain and has CHF documented. If possible, please   document in progress notes and discharge summary further specificity regarding   the type and acuity of CHF:    The medical record reflects the following:  Risk Factors: Hx CHF  Clinical Indicators: ECHO from 23--> Estimated LVEF 50-55%.; BNP 1309; CT   CHEST--> Trace left and small right-sided pleural effusions, with   cardiomegaly, patchy ground-glass change, interlobular smooth septal   thickening felt to represent cardiogenic pulmonary edema. ; per ED notes--> BNP   1309, improved from previous  Treatment: PO Bumex Held, ECHO ordered, BNP level, CT chest, monitoring,  Options provided:  -- Chronic Diastolic CHF/HFpEF  -- Other - I will add my own diagnosis  -- Disagree - Not applicable / Not valid  -- Disagree - Clinically unable to determine / Unknown  -- Refer to Clinical Documentation Reviewer    PROVIDER RESPONSE TEXT:    This patient has chronic diastolic CHF/HFpEF. Query created by: Hima Solorio on 3/22/2023 3:10 PM      Electronically signed by:   Christopher Fonseca MD 3/22/2023 3:41 PM
Physician Progress Note      PATIENT:               Lidia Cevallos  CSN #:                  [id number]  :                       1960  ADMIT DATE:       3/21/2023 6:48 PM  [address] DATE:        3/27/2023 3:02 PM  RESPONDING  PROVIDER #:        Shira Ricketts MD          QUERY TEXT:    Pt admitted with chest and back pain. Pt noted to have CAD with recent stent   and muscle spasm. If possible, please document in progress note if you are   evaluating and/or treating any of the following: The medical record reflects the following:  Risk Factors: Hx CAD w/ recent stent  Clinical Indicators: per 3/22 cardio consult--> Chest pain- less likely   cardiac- reproducible on exam- likely MSK pain,  Known CAD s/p CABG with PCI   to RCA on - no ECG changes or troponin rise above baseline, pain seeking   behavior; per 3/22 ortho consult-->Patient is having problems with thoracic   back pain originating in right inferior periscapular region associated with   muscle spasm. Yesterday in that location only today with some radiation to   left inferior periscapular region, Associated symptoms include chest pain; per   d/c summary-->Patient has a history of coronary a  Treatment: PO Robaxin started, echo, cardio consult, ortho consult, troponin   levels, monitoring, hospital admission  Options provided:  -- Chest pain due to musculoskeletal spasm/pain  -- Chest pain due to CAD with angina  -- Chest pain due to, please specify cause. -- Other - I will add my own diagnosis  -- Disagree - Not applicable / Not valid  -- Disagree - Clinically unable to determine / Unknown  -- Refer to Clinical Documentation Reviewer    PROVIDER RESPONSE TEXT:    This patient has CAD with angina. Query created by: Vermell Carrel on 2023 10:35 AM      Electronically signed by:   Shira Ricketts MD 2023 11:29 AM
Pt alert, oriented and medically stable for discharge. Pt items sent to facility with patient including cell phone, , tissues glasses, wallet. Reviewed discharge instructions, new medications and their side effects as well as follow up appointments with patient. Pt transported by EMTs via oxygen with . Packet given to EMTs. IV removed. Report called to [address] [place] [address]. Report given to [facility], nurse- see progress note [id number].
Pt needs scripts for norco and methocarbamol. Per Dr. Sharifa Stanton, he has never sent scripts for d/c to facility for narcotics and would like to speak to SW or care navigation about this. Gave information to Maddie Lobato to call him back.
Report called to Angel Caicedo, nurse at [facility]. Reviewed medications with Maddie. States will need script for methocarbamol 750 MG tablet tid prn. Billie served Dr. Ayanna Fuentes for it.
Spoke to [employer] about pts stat ct thoracic spine ordered by dr Demond Singleton. She stated pt was about to go to dialysis. [place] CT at [phone] when pt is done to coordinate transport for stat CT order.
Surgical Progress Note    POD:      Patient doing fairly well  Vitals:    23 0902   BP: 124/63   Pulse:    Resp:    Temp:    SpO2:       Temp (24hrs), Av.3 °F (36.8 °C), Min:97.9 °F (36.6 °C), Max:98.8 °F (37.1 °C)       Pain Control improved  No unusual nausea    Exam: Reports thoracic back pain is improv ing. Reports chest heaviness remains unchanged. ambulatory        Lungs:  No respiratory distress    Labs reviewed:  Labs:  WBC/Hgb/Hct/Plts:  5.8/10.5/31.8/187 ( 8115)  BUN/Cr/glu/ALT/AST/amyl/lip:  15/2.43/--/7/12/--/-- ( 3800)     Na/K/Cl/CO2:  139/4.0/100/27 ( 0603)    I/O last 3 completed shifts:   In: 6340 [P.O.:1030]  Out: 3079     Assessment:    Patient Active Problem List   Diagnosis    Atherosclerosis of coronary artery bypass graft of native heart without angina pectoris    Acute kidney injury superimposed on CKD ([facility])    Acute on chronic diastolic heart failure ([facility])    Diabetic polyneuropathy associated with type 2 diabetes mellitus [facility]    History of coronary artery bypass graft    Iron deficiency anemia    Spinal stenosis of lumbar region with neurogenic claudication    Mixed hyperlipidemia    CKD (chronic kidney disease) stage 4, GFR 15-29 ml/min ([facility])    Type 2 diabetes 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, with long-term current use of insulin ([facility])    Obesity, Class II, BMI 35-39.9    Thyroid nodule greater than or equal to 1 cm in diameter incidentally noted on imaging study    Hypertension, essential    Chronic ischemic heart disease    Ischemic stroke of frontal lobe ([facility]    Morbid obesity with BMI of 40.0-44.9, adult ([facility] [facility])    Disequilibrium syndrome    Anxiety    Chronic midline low back pain with bilateral sciatica    Acute thoracic back pain    COVID    Cerebral hypoperfusion    Immature arteriovenous fistula ([facility])    History of fusion of cervical spine    Depression with anxiety    Vancomycin resistant Enterococcus UTI    ESRD on
139 142   K 3.9 4.0 4.1    100 102   CO2 27 27 29   BUN 24* 15 26*   CREATININE 2.87* 2.43* 3.26*   GLUCOSE 357* 172* 205*       Magnesium:   Lab Results   Component Value Date/Time    MG 2.2 02/12/2023 06:07 AM     Phosphorus:   Lab Results   Component Value Date/Time    PHOS 3.8 03/22/2023 08:25 AM     Ionized Calcium:   Lab Results   Component Value Date/Time    CAION 1.04 04/18/2022 12:27 PM      PT/INR:    Lab Results   Component Value Date/Time    PROTIME 13.3 03/03/2023 05:47 AM    PROTIME 16.4 08/27/2015 12:00 AM    PROTIME 16.4 08/27/2015 12:00 AM    INR 1.0 03/03/2023 05:47 AM     PTT:    Lab Results   Component Value Date/Time    APTT 27.5 11/10/2021 03:50 PM         Elisa Bowen MD   3/24/2023 11:52 AM
CREATININE 2.51* 2.87* 2.43*   GLUCOSE 190* 357* 172*     Magnesium:   Lab Results   Component Value Date/Time    MG 2.2 02/12/2023 06:07 AM     Phosphorus:   Lab Results   Component Value Date/Time    PHOS 3.8 03/22/2023 08:25 AM     Ionized Calcium:   Lab Results   Component Value Date/Time    CAION 1.04 04/18/2022 12:27 PM      PT/INR:    Lab Results   Component Value Date/Time    PROTIME 13.3 03/03/2023 05:47 AM    PROTIME 16.4 08/27/2015 12:00 AM    PROTIME 16.4 08/27/2015 12:00 AM    INR 1.0 03/03/2023 05:47 AM     PTT:    Lab Results   Component Value Date/Time    APTT 27.5 11/10/2021 03:50 PM         Jose Mccray MD   3/23/2023 8:22 AM
PT/INR:    Lab Results   Component Value Date/Time    PROTIME 13.3 03/03/2023 05:47 AM    PROTIME 16.4 08/27/2015 12:00 AM    PROTIME 16.4 08/27/2015 12:00 AM    INR 1.0 03/03/2023 05:47 AM     PTT:    Lab Results   Component Value Date/Time    APTT 27.5 11/10/2021 03:50 PM         Azul Encarnacion MD   3/27/2023 11:29 AM
Tolerance: Patient limited by fatigue, Patient Tolerated treatment well  Assessment  Performance deficits / Impairments: Decreased functional mobility , Decreased ADL status, Decreased strength, Decreased cognition, Decreased endurance, Decreased balance, Decreased fine motor control, Decreased coordination  Treatment Diagnosis: Impaired self-care status  Prognosis: Good  Decision Making: Medium Complexity  Discharge Recommendations: Patient would benefit from continued therapy after discharge  OT Equipment Recommendations  Other: TBD  Safety Devices  Type of Devices: Patient at risk for falls, Call light within reach, Left in bed      AM-PAC Daily Activities Inpatient  AM-PAC Daily Activity - Inpatient   How much help is needed for putting on and taking off regular lower body clothing?: A Lot  How much help is needed for bathing (which includes washing, rinsing, drying)?: A Lot  How much help is needed for toileting (which includes using toilet, bedpan, or urinal)?: A Little  How much help is needed for putting on and taking off regular upper body clothing?: A Little  How much help is needed for taking care of personal grooming?: A Little  How much help for eating meals?: None  AM-PAC Inpatient Daily Activity Raw Score: 17  AM-PAC Inpatient ADL T-Scale Score : 37.26  ADL Inpatient CMS 0-100% Score: 50.11  ADL Inpatient CMS G-Code Modifier : CK    OT Minutes  OT Individual Minutes  Time In: 1022  Time Out: 1048  Minutes: 26      DAYANA Romero
ambulation  Stairs/Curb  Stairs?: No     Balance  Sitting - Static: Good  Sitting - Dynamic: Good  Standing - Static: Fair;+  Standing - Dynamic: Fair     AM-PAC Score  AM-PAC Inpatient Mobility Raw Score : 16 (03/23/23 1128)  AM-PAC Inpatient T-Scale Score : 40.78 (03/23/23 1128)  Mobility Inpatient CMS 0-100% Score: 54.16 (03/23/23 1128)  Mobility Inpatient CMS G-Code Modifier : CK (03/23/23 1128)        Goals  Short Term Goals  Time Frame for Short Term Goals: 1-2 days  Short Term Goal 1: transfers with SBA  Short Term Goal 2: ambulate with RW/O2 x 30ft without dizziness           Therapy Time   Individual Concurrent Group Co-treatment   Time In 1051         Time Out 1115         Minutes 24         Timed Code Treatment Minutes: [address] [facility] [address] Radha Carson, PT
taking off regular upper body clothing?: A Little  How much help is needed for taking care of personal grooming?: A Little  How much help for eating meals?: None  AM-PAC Inpatient Daily Activity Raw Score: 17  AM-PAC Inpatient ADL T-Scale Score : 37.26  ADL Inpatient CMS 0-100% Score: 50.11  ADL Inpatient CMS G-Code Modifier : CK    Goals  Patient Goals   Patient goals : Return to [facility] for rehab. Short Term Goals  Time Frame for Short Term Goals: By Discharge  Short Term Goal 1: Pt will complete LB ADL's with Min A using AE/modified techniques/AE as needed. Short Term Goal 2: Pt will complete toileting and toilet transfers with SBA and Good safety using least restrictive device. Short Term Goal 3: Pt will tolerate standing for 3-4 minutes with 1-2 UE support and no LOB while completing a functional task. Short Term Goal 4: Pt will V/D at least 3 fall prevention strategies that she can utilize during daily routines. Short Term Goal 5: Pt will actively participate in 15-20 minutes of therapeutic exercise/activity to promote increased independence and safety with self-care and mobility. Plan  Occupational Therapy Plan  Times Per Week: 5-6  Times Per Day:  Once a day  Current Treatment Recommendations: Balance training, Functional mobility training, Endurance training, Strengthening, Pain management, Safety education & training, Patient/Caregiver education & training, Equipment evaluation, education, & procurement, Self-Care / ADL    OT Individual Minutes  OT Individual Minutes  Time In: 1880  Time Out: 6995  Minutes: 25  Time Code Minutes   Timed Code Treatment Minutes: 10 Minutes    Electronically signed by ZAYDA Stringer on 3/22/23 at 3:27 PM EDT

## 2023-04-27 LAB
GLUCOSE BLD-MCNC: 112 MG/DL (ref 65–105)
GLUCOSE BLD-MCNC: 113 MG/DL (ref 65–105)
GLUCOSE BLD-MCNC: 222 MG/DL (ref 65–105)
GLUCOSE BLD-MCNC: 274 MG/DL (ref 65–105)
GLUCOSE BLD-MCNC: 72 MG/DL (ref 65–105)
GLUCOSE BLD-MCNC: 77 MG/DL (ref 65–105)

## 2023-04-27 PROCEDURE — 6370000000 HC RX 637 (ALT 250 FOR IP): Performed by: STUDENT IN AN ORGANIZED HEALTH CARE EDUCATION/TRAINING PROGRAM

## 2023-04-27 PROCEDURE — 6370000000 HC RX 637 (ALT 250 FOR IP): Performed by: SURGERY

## 2023-04-27 PROCEDURE — 97535 SELF CARE MNGMENT TRAINING: CPT

## 2023-04-27 PROCEDURE — 6360000002 HC RX W HCPCS: Performed by: SURGERY

## 2023-04-27 PROCEDURE — 6370000000 HC RX 637 (ALT 250 FOR IP)

## 2023-04-27 PROCEDURE — 82947 ASSAY GLUCOSE BLOOD QUANT: CPT

## 2023-04-27 PROCEDURE — 1200000000 HC SEMI PRIVATE

## 2023-04-27 PROCEDURE — 2580000003 HC RX 258: Performed by: SURGERY

## 2023-04-27 PROCEDURE — 99232 SBSQ HOSP IP/OBS MODERATE 35: CPT | Performed by: INTERNAL MEDICINE

## 2023-04-27 PROCEDURE — 6370000000 HC RX 637 (ALT 250 FOR IP): Performed by: NURSE PRACTITIONER

## 2023-04-27 RX ORDER — LACTULOSE 10 G/15ML
20 SOLUTION ORAL
Status: DISCONTINUED | OUTPATIENT
Start: 2023-04-27 | End: 2023-05-01 | Stop reason: HOSPADM

## 2023-04-27 RX ADMIN — INSULIN GLARGINE 56 UNITS: 100 INJECTION, SOLUTION SUBCUTANEOUS at 09:27

## 2023-04-27 RX ADMIN — HEPARIN SODIUM 5000 UNITS: 5000 INJECTION INTRAVENOUS; SUBCUTANEOUS at 14:53

## 2023-04-27 RX ADMIN — Medication 10 MG: at 21:38

## 2023-04-27 RX ADMIN — BUSPIRONE HYDROCHLORIDE 7.5 MG: 5 TABLET ORAL at 09:21

## 2023-04-27 RX ADMIN — INSULIN LISPRO 15 UNITS: 100 INJECTION, SOLUTION INTRAVENOUS; SUBCUTANEOUS at 18:10

## 2023-04-27 RX ADMIN — BISACODYL 10 MG: 10 SUPPOSITORY RECTAL at 08:24

## 2023-04-27 RX ADMIN — BUSPIRONE HYDROCHLORIDE 7.5 MG: 5 TABLET ORAL at 21:30

## 2023-04-27 RX ADMIN — DOCUSATE SODIUM 100 MG: 100 CAPSULE ORAL at 09:21

## 2023-04-27 RX ADMIN — INSULIN GLARGINE 56 UNITS: 100 INJECTION, SOLUTION SUBCUTANEOUS at 21:28

## 2023-04-27 RX ADMIN — SODIUM BICARBONATE 1300 MG: 648 TABLET ORAL at 09:21

## 2023-04-27 RX ADMIN — INSULIN LISPRO 15 UNITS: 100 INJECTION, SOLUTION INTRAVENOUS; SUBCUTANEOUS at 13:13

## 2023-04-27 RX ADMIN — TICAGRELOR 90 MG: 90 TABLET ORAL at 21:30

## 2023-04-27 RX ADMIN — ACETAMINOPHEN 1000 MG: 500 TABLET ORAL at 09:26

## 2023-04-27 RX ADMIN — HEPARIN SODIUM 5000 UNITS: 5000 INJECTION INTRAVENOUS; SUBCUTANEOUS at 05:56

## 2023-04-27 RX ADMIN — POLYETHYLENE GLYCOL 3350 17 G: 17 POWDER, FOR SOLUTION ORAL at 09:22

## 2023-04-27 RX ADMIN — ACETAMINOPHEN 1000 MG: 500 TABLET ORAL at 21:28

## 2023-04-27 RX ADMIN — ACETAMINOPHEN 1000 MG: 500 TABLET ORAL at 14:52

## 2023-04-27 RX ADMIN — ATORVASTATIN CALCIUM 80 MG: 80 TABLET, FILM COATED ORAL at 21:30

## 2023-04-27 RX ADMIN — SODIUM CHLORIDE, PRESERVATIVE FREE 10 ML: 5 INJECTION INTRAVENOUS at 21:29

## 2023-04-27 RX ADMIN — MAGNESIUM HYDROXIDE 30 ML: 400 SUSPENSION ORAL at 13:14

## 2023-04-27 RX ADMIN — CARVEDILOL 3.12 MG: 3.12 TABLET, FILM COATED ORAL at 09:21

## 2023-04-27 RX ADMIN — BUSPIRONE HYDROCHLORIDE 7.5 MG: 5 TABLET ORAL at 14:52

## 2023-04-27 RX ADMIN — HEPARIN SODIUM 5000 UNITS: 5000 INJECTION INTRAVENOUS; SUBCUTANEOUS at 21:30

## 2023-04-27 RX ADMIN — BUMETANIDE 3 MG: 1 TABLET ORAL at 09:21

## 2023-04-27 RX ADMIN — SODIUM BICARBONATE 1300 MG: 648 TABLET ORAL at 21:30

## 2023-04-27 RX ADMIN — TICAGRELOR 90 MG: 90 TABLET ORAL at 09:21

## 2023-04-27 RX ADMIN — ALLOPURINOL 100 MG: 100 TABLET ORAL at 09:21

## 2023-04-27 RX ADMIN — VENLAFAXINE HYDROCHLORIDE 37.5 MG: 37.5 CAPSULE, EXTENDED RELEASE ORAL at 09:21

## 2023-04-27 RX ADMIN — CARVEDILOL 3.12 MG: 3.12 TABLET, FILM COATED ORAL at 21:30

## 2023-04-27 ASSESSMENT — PAIN SCALES - GENERAL
PAINLEVEL_OUTOF10: 8
PAINLEVEL_OUTOF10: 8

## 2023-04-28 ENCOUNTER — APPOINTMENT (OUTPATIENT)
Dept: DIALYSIS | Age: 63
DRG: 252 | End: 2023-04-28
Attending: SURGERY
Payer: COMMERCIAL

## 2023-04-28 LAB
ANION GAP SERPL CALCULATED.3IONS-SCNC: 12 MMOL/L (ref 9–17)
BUN SERPL-MCNC: 34 MG/DL (ref 8–23)
CALCIUM SERPL-MCNC: 9 MG/DL (ref 8.6–10.4)
CHLORIDE SERPL-SCNC: 103 MMOL/L (ref 98–107)
CO2 SERPL-SCNC: 24 MMOL/L (ref 20–31)
CREAT SERPL-MCNC: 2.85 MG/DL (ref 0.5–0.9)
GFR SERPL CREATININE-BSD FRML MDRD: 18 ML/MIN/1.73M2
GLUCOSE BLD-MCNC: 275 MG/DL (ref 65–105)
GLUCOSE BLD-MCNC: 320 MG/DL (ref 65–105)
GLUCOSE SERPL-MCNC: 91 MG/DL (ref 70–99)
POTASSIUM SERPL-SCNC: 4.1 MMOL/L (ref 3.7–5.3)
SODIUM SERPL-SCNC: 139 MMOL/L (ref 135–144)

## 2023-04-28 PROCEDURE — 6370000000 HC RX 637 (ALT 250 FOR IP): Performed by: SURGERY

## 2023-04-28 PROCEDURE — 90935 HEMODIALYSIS ONE EVALUATION: CPT

## 2023-04-28 PROCEDURE — 36415 COLL VENOUS BLD VENIPUNCTURE: CPT

## 2023-04-28 PROCEDURE — 97116 GAIT TRAINING THERAPY: CPT

## 2023-04-28 PROCEDURE — 82947 ASSAY GLUCOSE BLOOD QUANT: CPT

## 2023-04-28 PROCEDURE — 97110 THERAPEUTIC EXERCISES: CPT

## 2023-04-28 PROCEDURE — 80048 BASIC METABOLIC PNL TOTAL CA: CPT

## 2023-04-28 PROCEDURE — 2580000003 HC RX 258: Performed by: SURGERY

## 2023-04-28 PROCEDURE — 6370000000 HC RX 637 (ALT 250 FOR IP): Performed by: STUDENT IN AN ORGANIZED HEALTH CARE EDUCATION/TRAINING PROGRAM

## 2023-04-28 PROCEDURE — 6370000000 HC RX 637 (ALT 250 FOR IP): Performed by: NURSE PRACTITIONER

## 2023-04-28 PROCEDURE — 6370000000 HC RX 637 (ALT 250 FOR IP)

## 2023-04-28 PROCEDURE — 6360000002 HC RX W HCPCS: Performed by: SURGERY

## 2023-04-28 PROCEDURE — 90935 HEMODIALYSIS ONE EVALUATION: CPT | Performed by: INTERNAL MEDICINE

## 2023-04-28 PROCEDURE — 6360000002 HC RX W HCPCS: Performed by: INTERNAL MEDICINE

## 2023-04-28 PROCEDURE — 1200000000 HC SEMI PRIVATE

## 2023-04-28 RX ADMIN — CARVEDILOL 3.12 MG: 3.12 TABLET, FILM COATED ORAL at 21:03

## 2023-04-28 RX ADMIN — INSULIN LISPRO 16 UNITS: 100 INJECTION, SOLUTION INTRAVENOUS; SUBCUTANEOUS at 21:02

## 2023-04-28 RX ADMIN — BUSPIRONE HYDROCHLORIDE 7.5 MG: 5 TABLET ORAL at 21:03

## 2023-04-28 RX ADMIN — HEPARIN SODIUM 1900 UNITS: 1000 INJECTION INTRAVENOUS; SUBCUTANEOUS at 13:34

## 2023-04-28 RX ADMIN — HEPARIN SODIUM 5000 UNITS: 5000 INJECTION INTRAVENOUS; SUBCUTANEOUS at 21:02

## 2023-04-28 RX ADMIN — HEPARIN SODIUM 5000 UNITS: 5000 INJECTION INTRAVENOUS; SUBCUTANEOUS at 15:48

## 2023-04-28 RX ADMIN — TICAGRELOR 90 MG: 90 TABLET ORAL at 08:54

## 2023-04-28 RX ADMIN — ACETAMINOPHEN 1000 MG: 500 TABLET ORAL at 05:52

## 2023-04-28 RX ADMIN — HEPARIN SODIUM 1900 UNITS: 1000 INJECTION INTRAVENOUS; SUBCUTANEOUS at 13:35

## 2023-04-28 RX ADMIN — ACETAMINOPHEN 1000 MG: 500 TABLET ORAL at 21:02

## 2023-04-28 RX ADMIN — INSULIN LISPRO 15 UNITS: 100 INJECTION, SOLUTION INTRAVENOUS; SUBCUTANEOUS at 17:23

## 2023-04-28 RX ADMIN — INSULIN LISPRO 14 UNITS: 100 INJECTION, SOLUTION INTRAVENOUS; SUBCUTANEOUS at 17:22

## 2023-04-28 RX ADMIN — BUSPIRONE HYDROCHLORIDE 7.5 MG: 5 TABLET ORAL at 15:48

## 2023-04-28 RX ADMIN — TICAGRELOR 90 MG: 90 TABLET ORAL at 21:03

## 2023-04-28 RX ADMIN — POLYETHYLENE GLYCOL 3350 17 G: 17 POWDER, FOR SOLUTION ORAL at 08:54

## 2023-04-28 RX ADMIN — DOCUSATE SODIUM 100 MG: 100 CAPSULE ORAL at 08:54

## 2023-04-28 RX ADMIN — BUMETANIDE 3 MG: 1 TABLET ORAL at 08:54

## 2023-04-28 RX ADMIN — Medication 10 MG: at 21:11

## 2023-04-28 RX ADMIN — BUMETANIDE 3 MG: 1 TABLET ORAL at 15:48

## 2023-04-28 RX ADMIN — SODIUM CHLORIDE, PRESERVATIVE FREE 10 ML: 5 INJECTION INTRAVENOUS at 08:55

## 2023-04-28 RX ADMIN — CARVEDILOL 3.12 MG: 3.12 TABLET, FILM COATED ORAL at 08:54

## 2023-04-28 RX ADMIN — ALLOPURINOL 100 MG: 100 TABLET ORAL at 08:55

## 2023-04-28 RX ADMIN — BUSPIRONE HYDROCHLORIDE 7.5 MG: 5 TABLET ORAL at 08:54

## 2023-04-28 RX ADMIN — ACETAMINOPHEN 1000 MG: 500 TABLET ORAL at 15:48

## 2023-04-28 RX ADMIN — SODIUM BICARBONATE 1300 MG: 648 TABLET ORAL at 08:54

## 2023-04-28 RX ADMIN — SODIUM CHLORIDE, PRESERVATIVE FREE 10 ML: 5 INJECTION INTRAVENOUS at 21:02

## 2023-04-28 RX ADMIN — ATORVASTATIN CALCIUM 80 MG: 80 TABLET, FILM COATED ORAL at 21:03

## 2023-04-28 RX ADMIN — INSULIN GLARGINE 56 UNITS: 100 INJECTION, SOLUTION SUBCUTANEOUS at 21:03

## 2023-04-28 RX ADMIN — HEPARIN SODIUM 5000 UNITS: 5000 INJECTION INTRAVENOUS; SUBCUTANEOUS at 05:52

## 2023-04-28 RX ADMIN — VENLAFAXINE HYDROCHLORIDE 37.5 MG: 37.5 CAPSULE, EXTENDED RELEASE ORAL at 08:54

## 2023-04-28 RX ADMIN — SODIUM BICARBONATE 1300 MG: 648 TABLET ORAL at 21:03

## 2023-04-28 ASSESSMENT — PAIN SCALES - GENERAL
PAINLEVEL_OUTOF10: 0
PAINLEVEL_OUTOF10: 0
PAINLEVEL_OUTOF10: 5
PAINLEVEL_OUTOF10: 0

## 2023-04-28 ASSESSMENT — PAIN DESCRIPTION - DESCRIPTORS: DESCRIPTORS: ACHING

## 2023-04-28 ASSESSMENT — PAIN SCALES - WONG BAKER: WONGBAKER_NUMERICALRESPONSE: 0

## 2023-04-28 ASSESSMENT — PAIN DESCRIPTION - LOCATION: LOCATION: ABDOMEN;ARM

## 2023-04-28 ASSESSMENT — PAIN DESCRIPTION - ORIENTATION: ORIENTATION: LEFT

## 2023-04-28 ASSESSMENT — PAIN - FUNCTIONAL ASSESSMENT: PAIN_FUNCTIONAL_ASSESSMENT: ACTIVITIES ARE NOT PREVENTED

## 2023-04-29 LAB
GLUCOSE BLD-MCNC: 162 MG/DL (ref 65–105)
GLUCOSE BLD-MCNC: 170 MG/DL (ref 65–105)
GLUCOSE BLD-MCNC: 176 MG/DL (ref 65–105)
GLUCOSE BLD-MCNC: 208 MG/DL (ref 65–105)
GLUCOSE BLD-MCNC: 314 MG/DL (ref 65–105)

## 2023-04-29 PROCEDURE — 6370000000 HC RX 637 (ALT 250 FOR IP): Performed by: SURGERY

## 2023-04-29 PROCEDURE — 97116 GAIT TRAINING THERAPY: CPT

## 2023-04-29 PROCEDURE — 6370000000 HC RX 637 (ALT 250 FOR IP): Performed by: NURSE PRACTITIONER

## 2023-04-29 PROCEDURE — 6370000000 HC RX 637 (ALT 250 FOR IP)

## 2023-04-29 PROCEDURE — 6360000002 HC RX W HCPCS: Performed by: SURGERY

## 2023-04-29 PROCEDURE — 2580000003 HC RX 258: Performed by: SURGERY

## 2023-04-29 PROCEDURE — 97110 THERAPEUTIC EXERCISES: CPT

## 2023-04-29 PROCEDURE — 1200000000 HC SEMI PRIVATE

## 2023-04-29 PROCEDURE — 97530 THERAPEUTIC ACTIVITIES: CPT

## 2023-04-29 PROCEDURE — 6370000000 HC RX 637 (ALT 250 FOR IP): Performed by: STUDENT IN AN ORGANIZED HEALTH CARE EDUCATION/TRAINING PROGRAM

## 2023-04-29 PROCEDURE — 82947 ASSAY GLUCOSE BLOOD QUANT: CPT

## 2023-04-29 RX ADMIN — CARVEDILOL 3.12 MG: 3.12 TABLET, FILM COATED ORAL at 20:43

## 2023-04-29 RX ADMIN — SODIUM BICARBONATE 1300 MG: 648 TABLET ORAL at 09:40

## 2023-04-29 RX ADMIN — TICAGRELOR 90 MG: 90 TABLET ORAL at 20:43

## 2023-04-29 RX ADMIN — INSULIN LISPRO 15 UNITS: 100 INJECTION, SOLUTION INTRAVENOUS; SUBCUTANEOUS at 13:16

## 2023-04-29 RX ADMIN — SODIUM BICARBONATE 1300 MG: 648 TABLET ORAL at 20:43

## 2023-04-29 RX ADMIN — VENLAFAXINE HYDROCHLORIDE 37.5 MG: 37.5 CAPSULE, EXTENDED RELEASE ORAL at 09:41

## 2023-04-29 RX ADMIN — SODIUM CHLORIDE, PRESERVATIVE FREE 10 ML: 5 INJECTION INTRAVENOUS at 20:42

## 2023-04-29 RX ADMIN — INSULIN GLARGINE 56 UNITS: 100 INJECTION, SOLUTION SUBCUTANEOUS at 20:42

## 2023-04-29 RX ADMIN — Medication 10 MG: at 20:50

## 2023-04-29 RX ADMIN — ACETAMINOPHEN 1000 MG: 500 TABLET ORAL at 14:14

## 2023-04-29 RX ADMIN — INSULIN LISPRO 15 UNITS: 100 INJECTION, SOLUTION INTRAVENOUS; SUBCUTANEOUS at 17:49

## 2023-04-29 RX ADMIN — HEPARIN SODIUM 5000 UNITS: 5000 INJECTION INTRAVENOUS; SUBCUTANEOUS at 20:42

## 2023-04-29 RX ADMIN — ACETAMINOPHEN 1000 MG: 500 TABLET ORAL at 06:33

## 2023-04-29 RX ADMIN — DOCUSATE SODIUM 100 MG: 100 CAPSULE ORAL at 09:41

## 2023-04-29 RX ADMIN — BUSPIRONE HYDROCHLORIDE 7.5 MG: 5 TABLET ORAL at 14:15

## 2023-04-29 RX ADMIN — BUSPIRONE HYDROCHLORIDE 7.5 MG: 5 TABLET ORAL at 20:43

## 2023-04-29 RX ADMIN — TICAGRELOR 90 MG: 90 TABLET ORAL at 09:41

## 2023-04-29 RX ADMIN — ACETAMINOPHEN 1000 MG: 500 TABLET ORAL at 20:42

## 2023-04-29 RX ADMIN — ALLOPURINOL 100 MG: 100 TABLET ORAL at 09:40

## 2023-04-29 RX ADMIN — ATORVASTATIN CALCIUM 80 MG: 80 TABLET, FILM COATED ORAL at 20:42

## 2023-04-29 RX ADMIN — HEPARIN SODIUM 5000 UNITS: 5000 INJECTION INTRAVENOUS; SUBCUTANEOUS at 06:33

## 2023-04-29 RX ADMIN — BUMETANIDE 3 MG: 1 TABLET ORAL at 09:40

## 2023-04-29 RX ADMIN — POLYETHYLENE GLYCOL 3350 17 G: 17 POWDER, FOR SOLUTION ORAL at 09:41

## 2023-04-29 RX ADMIN — HEPARIN SODIUM 5000 UNITS: 5000 INJECTION INTRAVENOUS; SUBCUTANEOUS at 14:15

## 2023-04-29 RX ADMIN — INSULIN GLARGINE 56 UNITS: 100 INJECTION, SOLUTION SUBCUTANEOUS at 09:44

## 2023-04-29 RX ADMIN — CARVEDILOL 3.12 MG: 3.12 TABLET, FILM COATED ORAL at 09:41

## 2023-04-29 RX ADMIN — SODIUM CHLORIDE, PRESERVATIVE FREE 10 ML: 5 INJECTION INTRAVENOUS at 09:42

## 2023-04-29 RX ADMIN — BUSPIRONE HYDROCHLORIDE 7.5 MG: 5 TABLET ORAL at 09:40

## 2023-04-29 ASSESSMENT — PAIN DESCRIPTION - ORIENTATION: ORIENTATION: LEFT

## 2023-04-29 ASSESSMENT — PAIN DESCRIPTION - DESCRIPTORS: DESCRIPTORS: ACHING

## 2023-04-29 ASSESSMENT — PAIN SCALES - WONG BAKER
WONGBAKER_NUMERICALRESPONSE: 0

## 2023-04-29 ASSESSMENT — PAIN - FUNCTIONAL ASSESSMENT: PAIN_FUNCTIONAL_ASSESSMENT: ACTIVITIES ARE NOT PREVENTED

## 2023-04-29 ASSESSMENT — PAIN DESCRIPTION - LOCATION: LOCATION: ARM

## 2023-04-29 ASSESSMENT — PAIN SCALES - GENERAL
PAINLEVEL_OUTOF10: 5
PAINLEVEL_OUTOF10: 0

## 2023-04-30 LAB
GLUCOSE BLD-MCNC: 192 MG/DL (ref 65–105)
GLUCOSE BLD-MCNC: 237 MG/DL (ref 65–105)
GLUCOSE BLD-MCNC: 264 MG/DL (ref 65–105)
GLUCOSE BLD-MCNC: 294 MG/DL (ref 65–105)

## 2023-04-30 PROCEDURE — 97110 THERAPEUTIC EXERCISES: CPT

## 2023-04-30 PROCEDURE — 6370000000 HC RX 637 (ALT 250 FOR IP): Performed by: SURGERY

## 2023-04-30 PROCEDURE — 1200000000 HC SEMI PRIVATE

## 2023-04-30 PROCEDURE — 6360000002 HC RX W HCPCS: Performed by: SURGERY

## 2023-04-30 PROCEDURE — 6370000000 HC RX 637 (ALT 250 FOR IP): Performed by: STUDENT IN AN ORGANIZED HEALTH CARE EDUCATION/TRAINING PROGRAM

## 2023-04-30 PROCEDURE — 99231 SBSQ HOSP IP/OBS SF/LOW 25: CPT | Performed by: INTERNAL MEDICINE

## 2023-04-30 PROCEDURE — 2580000003 HC RX 258: Performed by: SURGERY

## 2023-04-30 PROCEDURE — 82947 ASSAY GLUCOSE BLOOD QUANT: CPT

## 2023-04-30 PROCEDURE — 97116 GAIT TRAINING THERAPY: CPT

## 2023-04-30 PROCEDURE — 6370000000 HC RX 637 (ALT 250 FOR IP)

## 2023-04-30 PROCEDURE — 6370000000 HC RX 637 (ALT 250 FOR IP): Performed by: NURSE PRACTITIONER

## 2023-04-30 PROCEDURE — 97535 SELF CARE MNGMENT TRAINING: CPT

## 2023-04-30 RX ADMIN — INSULIN GLARGINE 56 UNITS: 100 INJECTION, SOLUTION SUBCUTANEOUS at 21:52

## 2023-04-30 RX ADMIN — BUSPIRONE HYDROCHLORIDE 7.5 MG: 5 TABLET ORAL at 16:17

## 2023-04-30 RX ADMIN — SODIUM BICARBONATE 1300 MG: 648 TABLET ORAL at 08:36

## 2023-04-30 RX ADMIN — ACETAMINOPHEN 1000 MG: 500 TABLET ORAL at 06:09

## 2023-04-30 RX ADMIN — ALLOPURINOL 100 MG: 100 TABLET ORAL at 08:36

## 2023-04-30 RX ADMIN — SODIUM CHLORIDE, PRESERVATIVE FREE 10 ML: 5 INJECTION INTRAVENOUS at 19:38

## 2023-04-30 RX ADMIN — HEPARIN SODIUM 5000 UNITS: 5000 INJECTION INTRAVENOUS; SUBCUTANEOUS at 06:09

## 2023-04-30 RX ADMIN — SODIUM BICARBONATE 1300 MG: 648 TABLET ORAL at 19:36

## 2023-04-30 RX ADMIN — ATORVASTATIN CALCIUM 80 MG: 80 TABLET, FILM COATED ORAL at 19:36

## 2023-04-30 RX ADMIN — ACETAMINOPHEN 1000 MG: 500 TABLET ORAL at 21:54

## 2023-04-30 RX ADMIN — DOCUSATE SODIUM 100 MG: 100 CAPSULE ORAL at 08:36

## 2023-04-30 RX ADMIN — Medication 10 MG: at 21:54

## 2023-04-30 RX ADMIN — TICAGRELOR 90 MG: 90 TABLET ORAL at 19:36

## 2023-04-30 RX ADMIN — HEPARIN SODIUM 5000 UNITS: 5000 INJECTION INTRAVENOUS; SUBCUTANEOUS at 16:17

## 2023-04-30 RX ADMIN — INSULIN LISPRO 8 UNITS: 100 INJECTION, SOLUTION INTRAVENOUS; SUBCUTANEOUS at 21:53

## 2023-04-30 RX ADMIN — INSULIN LISPRO 15 UNITS: 100 INJECTION, SOLUTION INTRAVENOUS; SUBCUTANEOUS at 12:29

## 2023-04-30 RX ADMIN — CARVEDILOL 3.12 MG: 3.12 TABLET, FILM COATED ORAL at 08:36

## 2023-04-30 RX ADMIN — HEPARIN SODIUM 5000 UNITS: 5000 INJECTION INTRAVENOUS; SUBCUTANEOUS at 21:54

## 2023-04-30 RX ADMIN — SODIUM CHLORIDE, PRESERVATIVE FREE 10 ML: 5 INJECTION INTRAVENOUS at 08:37

## 2023-04-30 RX ADMIN — ACETAMINOPHEN 1000 MG: 500 TABLET ORAL at 16:16

## 2023-04-30 RX ADMIN — INSULIN LISPRO 15 UNITS: 100 INJECTION, SOLUTION INTRAVENOUS; SUBCUTANEOUS at 08:41

## 2023-04-30 RX ADMIN — BUMETANIDE 3 MG: 1 TABLET ORAL at 16:17

## 2023-04-30 RX ADMIN — VENLAFAXINE HYDROCHLORIDE 37.5 MG: 37.5 CAPSULE, EXTENDED RELEASE ORAL at 08:36

## 2023-04-30 RX ADMIN — BUMETANIDE 3 MG: 1 TABLET ORAL at 08:36

## 2023-04-30 RX ADMIN — INSULIN LISPRO 15 UNITS: 100 INJECTION, SOLUTION INTRAVENOUS; SUBCUTANEOUS at 17:26

## 2023-04-30 RX ADMIN — INSULIN GLARGINE 56 UNITS: 100 INJECTION, SOLUTION SUBCUTANEOUS at 08:40

## 2023-04-30 RX ADMIN — TICAGRELOR 90 MG: 90 TABLET ORAL at 08:36

## 2023-04-30 RX ADMIN — BUSPIRONE HYDROCHLORIDE 7.5 MG: 5 TABLET ORAL at 08:37

## 2023-04-30 RX ADMIN — POLYETHYLENE GLYCOL 3350 17 G: 17 POWDER, FOR SOLUTION ORAL at 08:41

## 2023-04-30 RX ADMIN — BUSPIRONE HYDROCHLORIDE 7.5 MG: 5 TABLET ORAL at 19:36

## 2023-04-30 RX ADMIN — CARVEDILOL 3.12 MG: 3.12 TABLET, FILM COATED ORAL at 19:36

## 2023-04-30 ASSESSMENT — PAIN SCALES - WONG BAKER

## 2023-04-30 ASSESSMENT — PAIN SCALES - GENERAL
PAINLEVEL_OUTOF10: 0
PAINLEVEL_OUTOF10: 0
PAINLEVEL_OUTOF10: 3
PAINLEVEL_OUTOF10: 5

## 2023-04-30 ASSESSMENT — PAIN DESCRIPTION - LOCATION: LOCATION: ARM

## 2023-04-30 ASSESSMENT — PAIN DESCRIPTION - DESCRIPTORS: DESCRIPTORS: ACHING

## 2023-04-30 ASSESSMENT — PAIN - FUNCTIONAL ASSESSMENT: PAIN_FUNCTIONAL_ASSESSMENT: ACTIVITIES ARE NOT PREVENTED

## 2023-04-30 ASSESSMENT — PAIN DESCRIPTION - ORIENTATION: ORIENTATION: LEFT

## 2023-05-01 ENCOUNTER — APPOINTMENT (OUTPATIENT)
Dept: DIALYSIS | Age: 63
DRG: 252 | End: 2023-05-01
Attending: SURGERY
Payer: COMMERCIAL

## 2023-05-01 VITALS
WEIGHT: 220.68 LBS | SYSTOLIC BLOOD PRESSURE: 143 MMHG | RESPIRATION RATE: 18 BRPM | DIASTOLIC BLOOD PRESSURE: 54 MMHG | HEART RATE: 58 BPM | HEIGHT: 67 IN | BODY MASS INDEX: 34.64 KG/M2 | TEMPERATURE: 98.1 F | OXYGEN SATURATION: 98 %

## 2023-05-01 LAB
ABSOLUTE EOS #: 0.17 K/UL (ref 0–0.44)
ABSOLUTE IMMATURE GRANULOCYTE: 0.03 K/UL (ref 0–0.3)
ABSOLUTE LYMPH #: 1.25 K/UL (ref 1.1–3.7)
ABSOLUTE MONO #: 0.42 K/UL (ref 0.1–1.2)
ANION GAP SERPL CALCULATED.3IONS-SCNC: 14 MMOL/L (ref 9–17)
BASOPHILS # BLD: 1 % (ref 0–2)
BASOPHILS ABSOLUTE: 0.04 K/UL (ref 0–0.2)
BUN SERPL-MCNC: 38 MG/DL (ref 8–23)
CALCIUM SERPL-MCNC: 8.7 MG/DL (ref 8.6–10.4)
CHLORIDE SERPL-SCNC: 107 MMOL/L (ref 98–107)
CO2 SERPL-SCNC: 21 MMOL/L (ref 20–31)
CREAT SERPL-MCNC: 3.35 MG/DL (ref 0.5–0.9)
EOSINOPHILS RELATIVE PERCENT: 3 % (ref 1–4)
GFR SERPL CREATININE-BSD FRML MDRD: 15 ML/MIN/1.73M2
GLUCOSE BLD-MCNC: 118 MG/DL (ref 65–105)
GLUCOSE BLD-MCNC: 135 MG/DL (ref 65–105)
GLUCOSE BLD-MCNC: 142 MG/DL (ref 65–105)
GLUCOSE BLD-MCNC: 209 MG/DL (ref 65–105)
GLUCOSE SERPL-MCNC: 147 MG/DL (ref 70–99)
HCT VFR BLD AUTO: 31.2 % (ref 36.3–47.1)
HGB BLD-MCNC: 9.9 G/DL (ref 11.9–15.1)
IMMATURE GRANULOCYTES: 1 %
LYMPHOCYTES # BLD: 24 % (ref 24–43)
MAGNESIUM SERPL-MCNC: 2.3 MG/DL (ref 1.6–2.6)
MCH RBC QN AUTO: 33.1 PG (ref 25.2–33.5)
MCHC RBC AUTO-ENTMCNC: 31.7 G/DL (ref 28.4–34.8)
MCV RBC AUTO: 104.3 FL (ref 82.6–102.9)
MONOCYTES # BLD: 8 % (ref 3–12)
NRBC AUTOMATED: 0 PER 100 WBC
PDW BLD-RTO: 16.7 % (ref 11.8–14.4)
PHOSPHATE SERPL-MCNC: 6.2 MG/DL (ref 2.6–4.5)
PLATELET # BLD AUTO: 177 K/UL (ref 138–453)
PMV BLD AUTO: 10.3 FL (ref 8.1–13.5)
POTASSIUM SERPL-SCNC: 4.3 MMOL/L (ref 3.7–5.3)
RBC # BLD: 2.99 M/UL (ref 3.95–5.11)
RBC # BLD: ABNORMAL 10*6/UL
RBC # BLD: ABNORMAL 10*6/UL
SEG NEUTROPHILS: 63 % (ref 36–65)
SEGMENTED NEUTROPHILS ABSOLUTE COUNT: 3.31 K/UL (ref 1.5–8.1)
SODIUM SERPL-SCNC: 142 MMOL/L (ref 135–144)
WBC # BLD AUTO: 5.2 K/UL (ref 3.5–11.3)

## 2023-05-01 PROCEDURE — 84100 ASSAY OF PHOSPHORUS: CPT

## 2023-05-01 PROCEDURE — 6360000002 HC RX W HCPCS: Performed by: SURGERY

## 2023-05-01 PROCEDURE — 6370000000 HC RX 637 (ALT 250 FOR IP)

## 2023-05-01 PROCEDURE — 6370000000 HC RX 637 (ALT 250 FOR IP): Performed by: SURGERY

## 2023-05-01 PROCEDURE — 82947 ASSAY GLUCOSE BLOOD QUANT: CPT

## 2023-05-01 PROCEDURE — 97530 THERAPEUTIC ACTIVITIES: CPT

## 2023-05-01 PROCEDURE — 85025 COMPLETE CBC W/AUTO DIFF WBC: CPT

## 2023-05-01 PROCEDURE — 36415 COLL VENOUS BLD VENIPUNCTURE: CPT

## 2023-05-01 PROCEDURE — 80048 BASIC METABOLIC PNL TOTAL CA: CPT

## 2023-05-01 PROCEDURE — 6360000002 HC RX W HCPCS: Performed by: INTERNAL MEDICINE

## 2023-05-01 PROCEDURE — 90935 HEMODIALYSIS ONE EVALUATION: CPT | Performed by: INTERNAL MEDICINE

## 2023-05-01 PROCEDURE — 2580000003 HC RX 258: Performed by: SURGERY

## 2023-05-01 PROCEDURE — 90935 HEMODIALYSIS ONE EVALUATION: CPT

## 2023-05-01 PROCEDURE — 97110 THERAPEUTIC EXERCISES: CPT

## 2023-05-01 PROCEDURE — 83735 ASSAY OF MAGNESIUM: CPT

## 2023-05-01 PROCEDURE — 6370000000 HC RX 637 (ALT 250 FOR IP): Performed by: STUDENT IN AN ORGANIZED HEALTH CARE EDUCATION/TRAINING PROGRAM

## 2023-05-01 RX ADMIN — HEPARIN SODIUM 1900 UNITS: 1000 INJECTION INTRAVENOUS; SUBCUTANEOUS at 13:18

## 2023-05-01 RX ADMIN — ACETAMINOPHEN 1000 MG: 500 TABLET ORAL at 13:52

## 2023-05-01 RX ADMIN — VENLAFAXINE HYDROCHLORIDE 37.5 MG: 37.5 CAPSULE, EXTENDED RELEASE ORAL at 08:36

## 2023-05-01 RX ADMIN — INSULIN GLARGINE 56 UNITS: 100 INJECTION, SOLUTION SUBCUTANEOUS at 08:39

## 2023-05-01 RX ADMIN — SODIUM CHLORIDE, PRESERVATIVE FREE 10 ML: 5 INJECTION INTRAVENOUS at 08:46

## 2023-05-01 RX ADMIN — HEPARIN SODIUM 5000 UNITS: 5000 INJECTION INTRAVENOUS; SUBCUTANEOUS at 06:34

## 2023-05-01 RX ADMIN — ALLOPURINOL 100 MG: 100 TABLET ORAL at 08:39

## 2023-05-01 RX ADMIN — BUSPIRONE HYDROCHLORIDE 7.5 MG: 5 TABLET ORAL at 13:54

## 2023-05-01 RX ADMIN — TICAGRELOR 90 MG: 90 TABLET ORAL at 08:36

## 2023-05-01 RX ADMIN — HEPARIN SODIUM 1900 UNITS: 1000 INJECTION INTRAVENOUS; SUBCUTANEOUS at 13:19

## 2023-05-01 RX ADMIN — DOCUSATE SODIUM 100 MG: 100 CAPSULE ORAL at 08:36

## 2023-05-01 RX ADMIN — INSULIN LISPRO 15 UNITS: 100 INJECTION, SOLUTION INTRAVENOUS; SUBCUTANEOUS at 08:39

## 2023-05-01 RX ADMIN — ACETAMINOPHEN 1000 MG: 500 TABLET ORAL at 06:34

## 2023-05-01 RX ADMIN — INSULIN LISPRO 8 UNITS: 100 INJECTION, SOLUTION INTRAVENOUS; SUBCUTANEOUS at 08:45

## 2023-05-01 RX ADMIN — BUSPIRONE HYDROCHLORIDE 7.5 MG: 5 TABLET ORAL at 08:36

## 2023-05-01 RX ADMIN — SODIUM BICARBONATE 1300 MG: 648 TABLET ORAL at 08:36

## 2023-05-01 RX ADMIN — HEPARIN SODIUM 5000 UNITS: 5000 INJECTION INTRAVENOUS; SUBCUTANEOUS at 14:27

## 2023-05-01 ASSESSMENT — PAIN SCALES - WONG BAKER
WONGBAKER_NUMERICALRESPONSE: 0

## 2023-05-01 ASSESSMENT — PAIN SCALES - GENERAL: PAINLEVEL_OUTOF10: 0

## 2023-05-01 NOTE — PROGRESS NOTES
NEPHROLOGY DIALYSIS NOTE    PROCEDURE    Patient seen on Hemodialysis  5/1/2023 at 10:21 AM  Access cannulated without problems      TREATMENT ORDERS   See dialysis flowsheet for specifics on access, blood flow rate, dialysate baths, duration of dialysis, anticoagulation and other technical information. Dialysis Bath  K+ (Potassium): 3  Ca+ (Calcium): 2.25  Na+ (Sodium): 138  HCO3 (Bicarb): 35       INVESTIGATIONS     Last 3 CMP:    Recent Labs     05/01/23  0544      K 4.3      CO2 21   BUN 38*   CREATININE 3.35*   CALCIUM 8.7       Last 3 CBC:  Recent Labs     05/01/23  0544   WBC 5.2   RBC 2.99*   HGB 9.9*   HCT 31.2*   .3*   MCH 33.1   MCHC 31.7   RDW 16.7*      MPV 10.3         GFR: Estimated Creatinine Clearance: 20 mL/min (A) (based on SCr of 3.35 mg/dL (H)). Phosphorus:    Recent Labs     05/01/23  0544   PHOS 6.2*     Magnesium:   Recent Labs     05/01/23  0544   MG 2.3     Albumin: No results for input(s): LABALBU in the last 72 hours.   PTH                :No results found for: PTH           MEDICATIONS     Scheduled Meds:    lactulose  20 g Oral QHS    insulin lispro  0-16 Units SubCUTAneous Q6H    polyethylene glycol  17 g Oral Daily    bumetanide  3 mg Oral BID    oxyCODONE  5 mg Oral Once    ticagrelor  90 mg Oral BID    insulin glargine  56 Units SubCUTAneous BID    docusate sodium  100 mg Oral Daily    insulin lispro  15 Units SubCUTAneous TID AC    acetaminophen  1,000 mg Oral 3 times per day    sodium chloride flush  5-40 mL IntraVENous 2 times per day    allopurinol  100 mg Oral Daily    atorvastatin  80 mg Oral Nightly    busPIRone  7.5 mg Oral TID    carvedilol  3.125 mg Oral BID    venlafaxine  37.5 mg Oral Daily with breakfast    sodium bicarbonate  1,300 mg Oral BID    heparin (porcine)  5,000 Units SubCUTAneous 3 times per day     Continuous Infusions:   PRN Meds:  magnesium hydroxide, bisacodyl, oxyCODONE, heparin (porcine) PF, heparin (porcine) PF, ondansetron

## 2023-05-01 NOTE — CARE COORDINATION
Writer called and left message with Central intake ([phone]) inquiring about precert. Await call back     Chiragrohini Rodríguez from [place] [facility] that the precert is approved. PASRR and AVS are complete and faxed to [phone]. Report is to be called to [phone]. Rocklin Isadora asks that writer call her back at [phone] with transport time when avail. [address] [place] [address] Rd is arranged through [address] and will pick the patient up at 1600. RN is notified. Patient is in dialysis. 1300 patient is notified of above and agress to the plan. Patient states that she does not want the writer to notify anyone and that she would do that. Kei Yoon at [facility] [facility] is notified of the  time.

## 2023-05-01 NOTE — PROGRESS NOTES
Physical Therapy  Facility/Department: [facility] RENAL//MED SURG  Daily Treatment Note  NAME: Rakan Silva  : 1960  MRN: [id number]    Date of Service: 2023    Discharge Recommendations:  Patient would benefit from continued therapy after discharge        Patient Diagnosis(es): The encounter diagnosis was Acute postoperative pain. Assessment   Assessment: pt amb 80' with RW & close supervision. pt ilana sit <-> stand with supervision. pt ilana bilat LE seated ther ex. pt with noticeable fatigue during therpay. pt could benefit from cont therapies to improve her dynamic functional mobility. Activity Tolerance: Patient tolerated evaluation without incident;Patient limited by endurance     Plan    Physcial Therapy Plan  General Plan: 5-7 times per week  Current Treatment Recommendations: Strengthening; Functional mobility training;Transfer training; Endurance training;Stair training;Gait training; Safety education & training;ROM;Balance training;Pain management;Return to work related activity;Home exercise program;Patient/Caregiver education & training;Equipment evaluation, education, & procurement;Positioning; Therapeutic activities  PT Plan of Care: Daily     Restrictions  Restrictions/Precautions  Restrictions/Precautions: Up as Tolerated  Required Braces or Orthoses?: No  Position Activity Restriction  Other position/activity restrictions: LEFT AV FISTULA REVISION WITH SUPERFICIALIZATION23     Subjective    Subjective  Subjective: pt agreeableto participate with therapy. pt reports that she is to leave for dialysis soon. Pain: pt denies pain however reports L UE discomfort @ incision. Orientation  Overall Orientation Status: Within Functional Limits  Orientation Level: Oriented X4  Cognition  Overall Cognitive Status: WFL     Objective      Bed Mobility Training  Bed Mobility Training: No  Balance  Sitting: Without support (static seated balance with bilat foot support & no UE support -Independent.

## 2023-05-01 NOTE — PLAN OF CARE
Problem: Discharge Planning  Goal: Discharge to home or other facility with appropriate resources  Outcome: Completed     Problem: Safety - Adult  Goal: Free from fall injury  Outcome: Completed     Problem: ABCDS Injury Assessment  Goal: Absence of physical injury  Outcome: Completed     Problem: Chronic Conditions and Co-morbidities  Goal: Patient's chronic conditions and co-morbidity symptoms are monitored and maintained or improved  Outcome: Completed     Problem: Pain  Goal: Verbalizes/displays adequate comfort level or baseline comfort level  Outcome: Completed     Problem: Nutrition Deficit:  Goal: Optimize nutritional status  Outcome: Completed     Problem: Skin/Tissue Integrity  Goal: Absence of new skin breakdown  Description: 1. Monitor for areas of redness and/or skin breakdown  2. Assess vascular access sites hourly  3. Every 4-6 hours minimum:  Change oxygen saturation probe site  4. Every 4-6 hours:  If on nasal continuous positive airway pressure, respiratory therapy assess nares and determine need for appliance change or resting period.   Outcome: Completed

## 2023-05-01 NOTE — PROGRESS NOTES
Vascular Surgery Progress Note          PATIENT NAME: Rakan Silva     TODAY'S DATE: 5/1/2023    SUBJECTIVE:    Pt seen and examined. Doing well. No changes - awaiting placement. Hand remains perfused. Denies any numbness of extremities. Dialysis per nephro. Tolerating diet. She has no concerns this AM.     OBJECTIVE:   Vitals:  BP (!) 142/56   Pulse 64   Temp 98.4 °F (36.9 °C) (Oral)   Resp 16   Ht 5' 7\" (1.702 m)   Wt 217 lb 2.5 oz (98.5 kg)   SpO2 99%   BMI 34.01 kg/m²      INTAKE/OUTPUT:    No intake or output data in the 24 hours ending 05/01/23 0654    General: AOx3, NAD  Lungs: Normal effort, no accessory muscle use, no wheezing   Heart: Reg rate and rhythm   Abdomen: Soft, NT, ND  Extremity: moves all extremities x4. LUE incision CDI with nylon sutures in place.  Some expected ecchymosis around LUE incision     Data:  CBC with Differential:    Lab Results   Component Value Date/Time    WBC 5.2 05/01/2023 05:44 AM    RBC 2.99 05/01/2023 05:44 AM    HGB 9.9 05/01/2023 05:44 AM    HCT 31.2 05/01/2023 05:44 AM     05/01/2023 05:44 AM    .3 05/01/2023 05:44 AM    MCH 33.1 05/01/2023 05:44 AM    MCHC 31.7 05/01/2023 05:44 AM    RDW 16.7 05/01/2023 05:44 AM    LYMPHOPCT 24 05/01/2023 05:44 AM    LYMPHOPCT 23.1 04/09/2015 12:00 AM    MONOPCT 8 05/01/2023 05:44 AM    MONOPCT 4.6 04/09/2015 12:00 AM    EOSPCT 6.7 04/09/2015 12:00 AM    BASOPCT 1 05/01/2023 05:44 AM    BASOPCT 0.7 04/09/2015 12:00 AM    MONOSABS 0.42 05/01/2023 05:44 AM    MONOSABS 0.4 04/09/2015 12:00 AM    LYMPHSABS 1.25 05/01/2023 05:44 AM    LYMPHSABS 1.8 04/09/2015 12:00 AM    EOSABS 0.17 05/01/2023 05:44 AM    EOSABS 0.5 04/09/2015 12:00 AM    BASOSABS 0.04 05/01/2023 05:44 AM    DIFFTYPE NOT REPORTED 02/16/2022 07:01 PM     BMP:    Lab Results   Component Value Date/Time     05/01/2023 05:44 AM    K 4.3 05/01/2023 05:44 AM     05/01/2023 05:44 AM    CO2 21 05/01/2023 05:44 AM    BUN 38 05/01/2023 05:44 AM

## 2023-05-01 NOTE — PROGRESS NOTES
Dialysis Post Treatment Note  Vitals:    05/01/23 1327   BP: (!) 143/54   Pulse: 58   Resp: 18   Temp: 98.1 °F (36.7 °C)   SpO2:      Pre-Weight = 103 kg  Post-weight = Weight: 220 lb 10.9 oz (100.1 kg)  Total Liters Processed = Blood Volume Processed (Liters): 0 l/min  Rinseback Volume (mL) = Rinseback Volume (ml): 30 ml  Net Removal (mL) =  1900  Patient's dry weight=remove 2 kg  Type of access used=right perm cath  Length of treatment=210    Post treatment all blood returned, heparin instilled sterile end caps applied. Pt tolerated treatment well, returning to room via stretcher. Pt post treatment called to floor. Removed 1900 ml bp 143/54.

## 2023-05-01 NOTE — PROGRESS NOTES
Dialysis Time Out  To be done by RN and tech or 2 RNs  Staff Names Pranav BRYAN RN, Vinny Motne RN    [x]  Identity of the patient using 2 patient identifiers  [x]  Consent for treatment  [x]  Equipment-proper machine and dialyzer  [x]  B-Hep B status  [x]  Orders- to include bath, blood flow, dialyzer, time and fluid removal  [x]  Access-Correct site and in working order  [x]  Time for patient to ask questions.

## 2023-05-04 ENCOUNTER — HOSPITAL ENCOUNTER (OUTPATIENT)
Age: 63
Setting detail: SPECIMEN
Discharge: HOME | End: 2023-05-04

## 2023-05-04 ENCOUNTER — FOLLOWUP TELEPHONE ENCOUNTER (OUTPATIENT)
Dept: SOCIAL WORK | Age: 63
End: 2023-05-04

## 2023-05-04 LAB
ANION GAP SERPL CALCULATED.3IONS-SCNC: 15 MMOL/L (ref 9–17)
BUN SERPL-MCNC: 22 MG/DL (ref 8–23)
BUN/CREAT BLD: 8 (ref 9–20)
CALCIUM SERPL-MCNC: 9.1 MG/DL (ref 8.6–10.4)
CHLORIDE SERPL-SCNC: 97 MMOL/L (ref 98–107)
CO2 SERPL-SCNC: 27 MMOL/L (ref 20–31)
CREAT SERPL-MCNC: 2.61 MG/DL (ref 0.5–0.9)
GFR SERPL CREATININE-BSD FRML MDRD: 20 ML/MIN/1.73M2
GLUCOSE SERPL-MCNC: 268 MG/DL (ref 70–99)
HCT VFR BLD AUTO: 32.4 % (ref 36.3–47.1)
HGB BLD-MCNC: 9.8 G/DL (ref 11.9–15.1)
MCH RBC QN AUTO: 32.5 PG (ref 25.2–33.5)
MCHC RBC AUTO-ENTMCNC: 30.2 G/DL (ref 28.4–34.8)
MCV RBC AUTO: 107.3 FL (ref 82.6–102.9)
NRBC AUTOMATED: 0 PER 100 WBC
PDW BLD-RTO: 15.9 % (ref 11.8–14.4)
PLATELET # BLD AUTO: 211 K/UL (ref 138–453)
PMV BLD AUTO: 10.5 FL (ref 8.1–13.5)
POTASSIUM SERPL-SCNC: 4.2 MMOL/L (ref 3.7–5.3)
RBC # BLD: 3.02 M/UL (ref 3.95–5.11)
SODIUM SERPL-SCNC: 139 MMOL/L (ref 135–144)
WBC # BLD AUTO: 6.9 K/UL (ref 3.5–11.3)

## 2023-05-04 PROCEDURE — 85027 COMPLETE CBC AUTOMATED: CPT

## 2023-05-04 PROCEDURE — 36415 COLL VENOUS BLD VENIPUNCTURE: CPT

## 2023-05-04 PROCEDURE — 80048 BASIC METABOLIC PNL TOTAL CA: CPT

## 2023-05-04 PROCEDURE — P9603 ONE-WAY ALLOW PRORATED MILES: HCPCS

## 2023-05-04 NOTE — PROGRESS NOTES
BONNIE received [facility]. Jeramie Tellez told admissions that patient did not go to SNF and ended up continuing HD at [facility]. BONNIE left follow up message for Danni stating per chart patient was discharged to [facility] on 5/1/23. Unclear if circumstances changed once patient arrived at [facility]. Encouraged Danni to contact facility.

## 2023-05-05 ENCOUNTER — OFFICE VISIT (OUTPATIENT)
Dept: VASCULAR SURGERY | Age: 63
End: 2023-05-05

## 2023-05-05 VITALS
OXYGEN SATURATION: 95 % | HEIGHT: 67 IN | RESPIRATION RATE: 17 BRPM | SYSTOLIC BLOOD PRESSURE: 156 MMHG | DIASTOLIC BLOOD PRESSURE: 62 MMHG | HEART RATE: 84 BPM | WEIGHT: 220 LBS | BODY MASS INDEX: 34.53 KG/M2 | TEMPERATURE: 98 F

## 2023-05-05 DIAGNOSIS — Z09 POSTOPERATIVE EXAMINATION: Primary | ICD-10-CM

## 2023-05-05 NOTE — PROGRESS NOTES
Postop Progress Note    Subjective    Naty Meyer presents to the office for postop follow up. Recall that she had superficialization of the left upper extremity distal radiocephalic fistula with revision to a brachiocephalic position. There is an outstanding thrill in the distal radial pulse. The sutures have been removed partially. She is here today for removal of remaining sutures. She has just been discharged from the hospital to a rehab facility. Objective    Vitals:    05/05/23 1458   BP: (!) 156/62   Pulse:    Resp:    Temp:    SpO2:      General: alert, cooperative and no distress  Incision: healing well. There are some gaps and some minor skin dehiscence is without visibility of the vein. The sutures are still intact and I removed the upper portion of the incision in the lower portion as well as the transverse brachial incision and at the transverse proximal right cephalic incision. There are no signs of infection. Steri-Strips were applied. Assessment  Doing well postoperatively. Plan  We will have her back in another week to remove the remainder of the sutures. She understands and agrees.       Electronically signed by Dung Szymanski MD on 5/5/2023 at 3:15 PM

## 2023-05-12 ENCOUNTER — OFFICE VISIT (OUTPATIENT)
Dept: VASCULAR SURGERY | Age: 63
End: 2023-05-12

## 2023-05-12 VITALS
BODY MASS INDEX: 34.53 KG/M2 | HEIGHT: 67 IN | TEMPERATURE: 98 F | OXYGEN SATURATION: 98 % | HEART RATE: 84 BPM | RESPIRATION RATE: 17 BRPM | DIASTOLIC BLOOD PRESSURE: 84 MMHG | WEIGHT: 220 LBS | SYSTOLIC BLOOD PRESSURE: 132 MMHG

## 2023-05-12 DIAGNOSIS — T82.590S MECHANICAL COMPLICATION OF ARTERIOVENOUS FISTULA SURGICALLY CREATED, SEQUELA: Primary | ICD-10-CM

## 2023-05-12 PROCEDURE — 99024 POSTOP FOLLOW-UP VISIT: CPT | Performed by: SURGERY

## 2023-05-12 NOTE — PROGRESS NOTES
[facility]  [address] [facility]  [address] [place] [address]  Dept: [phone]     Patient: Alejandro Hall  : 1960  MRN: [id number]  DOS: 2023            HPI:  Alejandro Hall is a 70 y.o. female who returns to the office regarding her left upper extremity fistula revision with superficialization. I removed the remainder of the sutures today. She has no other complaints except for back spasms. Review of Systems    Vitals:    23 1326   BP: 132/84   Site: Right Upper Arm   Position: Sitting   Cuff Size: Large Adult   Pulse: 84   Resp: 17   Temp: 98 °F (36.7 °C)   TempSrc: Temporal   SpO2: 98%   Weight: 220 lb (99.8 kg)   Height: 5' 7\" (1.702 m)          Physical Exam  The incision continues to heal.  There is some scabbing in the midportion where I removed the sutures. There is no significant dehiscence. There is a palpable thrill in the fistula. Assessment:  1. Mechanical complication of arteriovenous fistula surgically created, sequela          Plan: At this point we will need to allow it to heal for another month before it can be used. She understands and agrees we will see her back to make certain that it will be sufficiently healed before it is used.     Electronically signed by:  Jace Anguiano MD

## 2023-05-18 ENCOUNTER — HOSPITAL ENCOUNTER (OUTPATIENT)
Age: 63
Setting detail: SPECIMEN
Discharge: HOME | End: 2023-05-18

## 2023-05-18 LAB
ANION GAP SERPL CALCULATED.3IONS-SCNC: 14 MMOL/L (ref 9–17)
BUN SERPL-MCNC: 31 MG/DL (ref 8–23)
BUN/CREAT SERPL: 9 (ref 9–20)
CALCIUM SERPL-MCNC: 9.1 MG/DL (ref 8.6–10.4)
CHLORIDE SERPL-SCNC: 96 MMOL/L (ref 98–107)
CO2 SERPL-SCNC: 27 MMOL/L (ref 20–31)
CREAT SERPL-MCNC: 3.29 MG/DL (ref 0.5–0.9)
ERYTHROCYTE [DISTWIDTH] IN BLOOD BY AUTOMATED COUNT: 14.1 % (ref 11.8–14.4)
GFR SERPL CREATININE-BSD FRML MDRD: 15 ML/MIN/1.73M2
GLUCOSE SERPL-MCNC: 320 MG/DL (ref 70–99)
HCT VFR BLD AUTO: 34.3 % (ref 36.3–47.1)
HGB BLD-MCNC: 10.5 G/DL (ref 11.9–15.1)
MCH RBC QN AUTO: 30.8 PG (ref 25.2–33.5)
MCHC RBC AUTO-ENTMCNC: 30.6 G/DL (ref 28.4–34.8)
MCV RBC AUTO: 100.6 FL (ref 82.6–102.9)
NRBC AUTOMATED: 0 PER 100 WBC
PLATELET # BLD AUTO: 195 K/UL (ref 138–453)
PMV BLD AUTO: 11.1 FL (ref 8.1–13.5)
POTASSIUM SERPL-SCNC: 4.3 MMOL/L (ref 3.7–5.3)
RBC # BLD AUTO: 3.41 M/UL (ref 3.95–5.11)
SODIUM SERPL-SCNC: 137 MMOL/L (ref 135–144)
WBC OTHER # BLD: 6 K/UL (ref 3.5–11.3)

## 2023-05-18 PROCEDURE — P9603 ONE-WAY ALLOW PRORATED MILES: HCPCS

## 2023-05-18 PROCEDURE — 80048 BASIC METABOLIC PNL TOTAL CA: CPT

## 2023-05-18 PROCEDURE — 85027 COMPLETE CBC AUTOMATED: CPT

## 2023-05-18 PROCEDURE — 36415 COLL VENOUS BLD VENIPUNCTURE: CPT

## 2023-05-22 ENCOUNTER — APPOINTMENT (OUTPATIENT)
Dept: INTERVENTIONAL RADIOLOGY/VASCULAR | Age: 63
End: 2023-05-22
Payer: COMMERCIAL

## 2023-05-22 ENCOUNTER — HOSPITAL ENCOUNTER (EMERGENCY)
Age: 63
Discharge: HOME | End: 2023-05-22
Attending: EMERGENCY MEDICINE
Payer: COMMERCIAL

## 2023-05-22 ENCOUNTER — HOSPITAL ENCOUNTER (EMERGENCY)
Dept: DIALYSIS | Age: 63
Discharge: HOME | End: 2023-05-22
Payer: COMMERCIAL

## 2023-05-22 VITALS
RESPIRATION RATE: 16 BRPM | HEART RATE: 60 BPM | BODY MASS INDEX: 34.46 KG/M2 | DIASTOLIC BLOOD PRESSURE: 60 MMHG | WEIGHT: 220 LBS | TEMPERATURE: 97.8 F | SYSTOLIC BLOOD PRESSURE: 139 MMHG | OXYGEN SATURATION: 93 %

## 2023-05-22 VITALS
WEIGHT: 226.19 LBS | OXYGEN SATURATION: 99 % | TEMPERATURE: 98.1 F | BODY MASS INDEX: 35.43 KG/M2 | RESPIRATION RATE: 15 BRPM | DIASTOLIC BLOOD PRESSURE: 60 MMHG | HEART RATE: 60 BPM | SYSTOLIC BLOOD PRESSURE: 132 MMHG

## 2023-05-22 DIAGNOSIS — T82.9XXA COMPLICATION ASSOCIATED WITH DIALYSIS CATHETER: Primary | ICD-10-CM

## 2023-05-22 LAB
ANION GAP SERPL CALCULATED.3IONS-SCNC: 11 MMOL/L (ref 9–17)
BASOPHILS # BLD: 0.04 K/UL (ref 0–0.2)
BASOPHILS NFR BLD: 0 % (ref 0–2)
BUN SERPL-MCNC: 39 MG/DL (ref 8–23)
CALCIUM SERPL-MCNC: 9.1 MG/DL (ref 8.6–10.4)
CHLORIDE SERPL-SCNC: 101 MMOL/L (ref 98–107)
CO2 SERPL-SCNC: 26 MMOL/L (ref 20–31)
CREAT SERPL-MCNC: 3.38 MG/DL (ref 0.5–0.9)
EOSINOPHIL # BLD: 0.22 K/UL (ref 0–0.44)
EOSINOPHILS RELATIVE PERCENT: 2 % (ref 1–4)
ERYTHROCYTE [DISTWIDTH] IN BLOOD BY AUTOMATED COUNT: 14.8 % (ref 11.8–14.4)
GFR SERPL CREATININE-BSD FRML MDRD: 14 ML/MIN/1.73M2
GLUCOSE SERPL-MCNC: 200 MG/DL (ref 70–99)
HCT VFR BLD AUTO: 29.1 % (ref 36.3–47.1)
HGB BLD-MCNC: 9.5 G/DL (ref 11.9–15.1)
IMM GRANULOCYTES # BLD AUTO: 0.07 K/UL (ref 0–0.3)
IMM GRANULOCYTES NFR BLD: 1 %
INR PPP: 1
LYMPHOCYTES # BLD: 9 % (ref 24–43)
LYMPHOCYTES NFR BLD: 1.05 K/UL (ref 1.1–3.7)
MCH RBC QN AUTO: 32.5 PG (ref 25.2–33.5)
MCHC RBC AUTO-ENTMCNC: 32.6 G/DL (ref 28.4–34.8)
MCV RBC AUTO: 99.7 FL (ref 82.6–102.9)
MONOCYTES NFR BLD: 0.56 K/UL (ref 0.1–1.2)
MONOCYTES NFR BLD: 5 % (ref 3–12)
NEUTROPHILS NFR BLD: 83 % (ref 36–65)
NEUTS SEG NFR BLD: 9.82 K/UL (ref 1.5–8.1)
NRBC AUTOMATED: 0 PER 100 WBC
PARTIAL THROMBOPLASTIN TIME: 54.7 SEC (ref 23–36.5)
PLATELET # BLD AUTO: 154 K/UL (ref 138–453)
PMV BLD AUTO: 10.3 FL (ref 8.1–13.5)
POTASSIUM SERPL-SCNC: 4.2 MMOL/L (ref 3.7–5.3)
PROTHROMBIN TIME: 12.7 SEC (ref 11.7–14.9)
RBC # BLD AUTO: 2.92 M/UL (ref 3.95–5.11)
RBC # BLD: ABNORMAL 10*6/UL
SODIUM SERPL-SCNC: 138 MMOL/L (ref 135–144)
WBC OTHER # BLD: 11.8 K/UL (ref 3.5–11.3)

## 2023-05-22 PROCEDURE — 96366 THER/PROPH/DIAG IV INF ADDON: CPT

## 2023-05-22 PROCEDURE — 85730 THROMBOPLASTIN TIME PARTIAL: CPT

## 2023-05-22 PROCEDURE — 85610 PROTHROMBIN TIME: CPT

## 2023-05-22 PROCEDURE — 85025 COMPLETE CBC W/AUTO DIFF WBC: CPT

## 2023-05-22 PROCEDURE — 6360000002 HC RX W HCPCS: Performed by: INTERNAL MEDICINE

## 2023-05-22 PROCEDURE — 90935 HEMODIALYSIS ONE EVALUATION: CPT

## 2023-05-22 PROCEDURE — 2709999900 HC NON-CHARGEABLE SUPPLY

## 2023-05-22 PROCEDURE — C1769 GUIDE WIRE: HCPCS

## 2023-05-22 PROCEDURE — 96365 THER/PROPH/DIAG IV INF INIT: CPT

## 2023-05-22 PROCEDURE — 77001 FLUOROGUIDE FOR VEIN DEVICE: CPT

## 2023-05-22 PROCEDURE — 99284 EMERGENCY DEPT VISIT MOD MDM: CPT

## 2023-05-22 PROCEDURE — 36581 REPLACE TUNNELED CV CATH: CPT

## 2023-05-22 PROCEDURE — 80048 BASIC METABOLIC PNL TOTAL CA: CPT

## 2023-05-22 PROCEDURE — C1750 CATH, HEMODIALYSIS,LONG-TERM: HCPCS

## 2023-05-22 PROCEDURE — 6360000002 HC RX W HCPCS: Performed by: RADIOLOGY

## 2023-05-22 PROCEDURE — 2580000003 HC RX 258: Performed by: RADIOLOGY

## 2023-05-22 RX ORDER — HEPARIN SODIUM 1000 [USP'U]/ML
1900 INJECTION, SOLUTION INTRAVENOUS; SUBCUTANEOUS ONCE
Status: COMPLETED | OUTPATIENT
Start: 2023-05-22 | End: 2023-05-22

## 2023-05-22 RX ORDER — HEPARIN SODIUM 1000 [USP'U]/ML
INJECTION, SOLUTION INTRAVENOUS; SUBCUTANEOUS PRN
Status: COMPLETED | OUTPATIENT
Start: 2023-05-22 | End: 2023-05-22

## 2023-05-22 RX ADMIN — HEPARIN SODIUM 1900 UNITS: 1000 INJECTION INTRAVENOUS; SUBCUTANEOUS at 17:47

## 2023-05-22 RX ADMIN — HEPARIN SODIUM 1900 UNITS: 1000 INJECTION, SOLUTION INTRAVENOUS; SUBCUTANEOUS at 14:09

## 2023-05-22 RX ADMIN — CEFAZOLIN 1000 MG: 1 INJECTION, POWDER, FOR SOLUTION INTRAMUSCULAR; INTRAVENOUS at 14:03

## 2023-05-22 RX ADMIN — HEPARIN SODIUM 1900 UNITS: 1000 INJECTION, SOLUTION INTRAVENOUS; SUBCUTANEOUS at 14:10

## 2023-05-22 RX ADMIN — HEPARIN SODIUM 1900 UNITS: 1000 INJECTION INTRAVENOUS; SUBCUTANEOUS at 17:48

## 2023-05-22 ASSESSMENT — PAIN SCALES - GENERAL
PAINLEVEL_OUTOF10: 0
PAINLEVEL_OUTOF10: 0

## 2023-05-22 ASSESSMENT — PAIN - FUNCTIONAL ASSESSMENT
PAIN_FUNCTIONAL_ASSESSMENT: NONE - DENIES PAIN

## 2023-05-22 ASSESSMENT — ENCOUNTER SYMPTOMS: COLOR CHANGE: 0

## 2023-05-22 NOTE — ED PROVIDER NOTES
[facility]  Emergency Department  Emergency Medicine Resident Sign-out     Care of Jonathan Gonzalez was assumed from Dr. Margie Ortiz and is being seen for Vascular Access Problem (Dialysis port )  . The patient's initial evaluation and plan have been discussed with the prior provider who initially evaluated the patient. EMERGENCY DEPARTMENT COURSE / MEDICAL DECISION MAKING:       MEDICATIONS GIVEN:  Orders Placed This Encounter   Medications    ceFAZolin (ANCEF) 1,000 mg in sodium chloride 0.9 % 50 mL IVPB (mini-bag)    heparin (porcine) PF injection       LABS / RADIOLOGY:     Labs Reviewed   BASIC METABOLIC PANEL - Abnormal; Notable for the following components:       Result Value    Glucose 200 (*)     BUN 39 (*)     Creatinine 3.38 (*)     Est, Glom Filt Rate 14 (*)     All other components within normal limits   CBC WITH AUTO DIFFERENTIAL - Abnormal; Notable for the following components:    WBC 11.8 (*)     RBC 2.92 (*)     Hemoglobin 9.5 (*)     Hematocrit 29.1 (*)     RDW 14.8 (*)     Seg Neutrophils 83 (*)     Lymphocytes 9 (*)     Immature Granulocytes 1 (*)     Segs Absolute 9.82 (*)     Absolute Lymph # 1.05 (*)     All other components within normal limits   APTT - Abnormal; Notable for the following components:    PTT 54.7 (*)     All other components within normal limits   PROTIME-INR       No results found. RECENT VITALS:     Temp: 97.8 °F (36.6 °C),  Pulse: 75, Respirations: 16, BP: (!) 140/61, SpO2: 97 %      This patient is a 70 y.o. Female with dialysis catheter obstruction. IR switched catheter, patient in IR. Will need reevaluation and discharge. ED Course as of 05/22/23 1723   Mon May 22, 2023   1300 Evaluated by plastic surgery, recommending line placement. They will perform her interventional radiology will perform, they are placing orders.  [CR]   2279 Patient pleated line placement, discussed nephrology about dialysis, plan for dialysis here tonight. [CR]

## 2023-05-22 NOTE — ED PROVIDER NOTES
[facility] ED  Emergency Department Encounter  Emergency Medicine Resident     Pt Name:Estefany Graves  MRN: [id number]  [place] 1960  Date of evaluation: 5/22/23  PCP:  AFSANEH Albarado CNP  Note Started: 10:01 AM EDT      CHIEF COMPLAINT       Chief Complaint   Patient presents with    Vascular Access Problem     Dialysis port        HISTORY OF PRESENT ILLNESS  (Location/Symptom, Timing/Onset, Context/Setting, Quality, Duration, Modifying Factors, Severity.)      Karina Lind is a 70 y.o. female who presents with dialysis catheter issues. Patient was at dialysis Friday and stated dialysis was difficult due to catheter problems. Patient was able to obtain dialysis, received 30min. He denies any signs infection, drainage from catheter. Patient has bleeding from catheter. Patient does have a fistula is not fully matured yet and will be ready about a month. Patient does not know exactly what the problem with her catheter concern for low flow. Patient takes dialysis Monday, Wednesday, and Friday. PAST MEDICAL / SURGICAL / SOCIAL / FAMILY HISTORY      has a past medical history of Arthritis, Backache, unspecified, CAD (coronary artery disease), Cerebral artery occlusion with cerebral infarction ([address] CHF (congestive heart failure) ([address] Chronic kidney disease, Coronary atherosclerosis of artery bypass graft, COVID, Cramp of limb, Gallstones, Hemodialysis patient ([address] Hyperlipidemia, Hypertension, Insomnia, Neuromuscular disorder ([address] Pneumonia, Psychiatric problem, Thyroid disease, Type II or unspecified type diabetes mellitus with renal manifestations, not stated as uncontrolled(250.40), Type II or unspecified type diabetes mellitus without mention of complication, not stated as uncontrolled, and Unspecified vitamin D deficiency. No additional pertinent        has a past surgical history that includes Coronary artery bypass graft (02/2005); Knee arthroscopy;  [place]arp[place]

## 2023-05-22 NOTE — ED NOTES
Pt back from dialysis. Resident notified. Pt respirations are even and unlabored, pt is alert and oriented X 4, speaking in complete sentences, bed is in the lowest position, call light is within reach.          Joseph Gaxiola RN  05/22/23 9585

## 2023-05-22 NOTE — DISCHARGE INSTRUCTIONS
Thank you for visiting [facility] Emergency Department. You need to call AFSANEH Gregorio CNP to make an appointment as directed for follow up. Should you have any questions regarding your care or further treatment, please call [facility] at [phone]. Continue dialysis on Monday Wednesdays and Fridays. Patient demonstrated with any other dialysis catheter issues. Please follow-up with vascular surgery and nephrology. Return for any worsening or concerning signs of infection, including fevers Controlled with ibuprofen or Tylenol, erythema/red skin, drainage or any other concerns.

## 2023-05-22 NOTE — CONSULTS
Division of Vascular Surgery          Vascular Consult      Name: Efrain Bob  MRN: [id number]     5/22/2023   11:25 AM   30/31 - Room   Physician Requesting Consult:  Dr. Wendi Christie    Reason for Consult:   Malfunctioning tunneled dialysis catheter    Chief Complaint:      Malfunctioning Tunneled dialysis catheter at the dialysis centre    History of Present Illness:      Efrain Bob is a 70 y.o.  female with medical history of CAD, CHF, CKD on hemodialysis Monday Wednesdays and Fridays, HTN, hypothyroidism, DM 2 with left brachiocephalic fistula with superficialization done on 4/19/2023. She came to ED after being sent from dialysis center for malfunctioning tunneled dialysis catheter. According to the patient there was increased resistance at the catheter site. She received incomplete dialysis which was 30 minutes short of her 4 times on Friday. Her tunneled dialysis catheter was exchanged over wire on 4/13/2023. Does not complain of shortness of breath, swelling in the extremities, chest pain, fever, chills.     On aspirin and Brilinta for antiplatelet therapy  Past Medical History:     Past Medical History:   Diagnosis Date    Arthritis     Backache, unspecified     CAD (coronary artery disease)     Cerebral artery occlusion with cerebral infarction (HCC)     CHF (congestive heart failure) (HCC)     Chronic kidney disease     Coronary atherosclerosis of artery bypass graft     COVID 01/31/2022    Cramp of limb     Gallstones     Hemodialysis patient ([facility] [address]     [id number] [facility]  /  [place]    Hyperlipidemia     Hypertension     Insomnia     Neuromuscular disorder ([facility] [address]     Pneumonia     Psychiatric problem     Thyroid disease     Type II or unspecified type diabetes mellitus with renal manifestations, not stated as uncontrolled(250.40)     Type II or unspecified type diabetes mellitus without mention of complication, not stated as uncontrolled     Unspecified vitamin D

## 2023-05-22 NOTE — BRIEF OP NOTE
Brief Postoperative Note    Bijal Rodriguez  YOB: 1960  [id number]    Pre-operative Diagnosis: Chronic Renal Failure/Malfunctioning HD Catheter    Post-operative Diagnosis: Same    Procedure: Tunneled HD Catheter Exchange    Anesthesia: Local 2% Lidocaine    Medications Given: none    Surgeons/Assistants:  Charles Joseph MD    Estimated Blood Loss: Minimal    Complications: none    Specimens: were not obtained    Tunneled HD catheter exchanged for new 14Fr x 23 cm tip to cuff Palindrome HD catheter. Dressing applied. May use HD catheter for dialysis. Catheter locked with heparin. Vitals signs were reviewed and were stable after the procedure.       Electronically signed by ULICES Norris on 5/22/2023 at 2:02 PM

## 2023-05-22 NOTE — ED PROVIDER NOTES
FACULTY SIGN-OUT  ADDENDUM     Care of this patient was assumed from previous attending physician. The patient's initial evaluation and plan have been discussed with the prior provider who initially evaluated the patient. Note Started: 4:38 PM EDT    Attestation  I was available and discussed any additional care issues that arose and coordinated the management plans with the resident(s) caring for the patient during my duty period. Any areas of disagreement with resident's documentation of care or procedures are noted on the chart. I was personally present for the key portions of any/all procedures, during my duty period. I have documented in the chart those procedures where I was not present during the key portions. ED COURSE      The patient was given the following medications:  Orders Placed This Encounter   Medications    ceFAZolin (ANCEF) 1,000 mg in sodium chloride 0.9 % 50 mL IVPB (mini-bag)    heparin (porcine) PF injection       RECENT VITALS:   Temp: 97.8 °F (36.6 °C), Pulse: 75, Respirations: 16, BP: (!) 140/61    MEDICAL DECISION MAKING        Keyonna Fontaine is a 70 y.o. female who presents to the Emergency Department with complaints of complications of dialysis catheter which has been replaced. Currently in dialysis. Anticipate disposition of discharged home after dialysis treatment. Anamkia Jackson MD, MD, F.A.C.E.P.   Attending Emergency Physician    (Please note that portions of this note were completed with a voice recognition program.  Efforts were made to edit the dictations but occasionally words are mis-transcribed.)         Anamika Jackson MD  05/22/23 1854

## 2023-05-22 NOTE — ED TRIAGE NOTES
Patient was wheeled (her own [facility]) to room 28 from triage with her sister for c/o of having dialysis access issues. The RN at her dialysis center stated she cant access it for her treatment. Her last session was Friday but she received 30% less of it and shes supposed to have a session today. Pt respirations are even and unlabored, pt is alert and oriented X 4, speaking in complete sentences, bed is in the lowest position, call light is within reach.

## 2023-05-22 NOTE — PROGRESS NOTES
Dialysis Post Treatment Note  Vitals:    05/22/23 1810   BP: 132/60   Pulse: 60   Resp: 15   Temp: 98.1 °F (36.7 °C)   SpO2: 99%     Pre-Weight = 106 kg  Post-weight = Weight - Scale: 226 lb 3.1 oz (102.6 kg)  Total Liters Processed = Blood Volume Processed (Liters): 54.39 l/min  Rinseback Volume (mL) = Rinseback Volume (ml): 270 ml  Net Removal (mL) =  3000 ml  Patient's dry weight=100 kg  Type of access used=CVC Double Lumen @ Right Internal Jugular  Length of treatment=182 minutes      Pt completed HD TX well, with v/s stable throughout dialysis. Total fluids taken off was 3 L. Transported back to ED awake and ambulatory, with stable v/s.

## 2023-05-22 NOTE — PROGRESS NOTES
Dialysis Time Out  To be done by RN and tech or 2 RNs  Staff Names Bonita Gloria. DEDE and Oliver Fong    [x]  Identity of the patient using 2 patient identifiers  [x]  Consent for treatment  [x]  Equipment-proper machine and dialyzer  [x]  B-Hep B status  [x]  Orders- to include bath, blood flow, dialyzer, time and fluid removal  [x]  Access-Correct site and in working order  [x]  Time for patient to ask questions.

## 2023-05-22 NOTE — ED PROVIDER NOTES
[facility]  Emergency Department  Faculty Attestation     I performed a history and physical examination of the patient and discussed management with the resident. I reviewed the residents note and agree with the documented findings and plan of care. Any areas of disagreement are noted on the chart. I was personally present for the key portions of any procedures. I have documented in the chart those procedures where I was not present during the key portions. I have reviewed the emergency nurses triage note. I agree with the chief complaint, past medical history, past surgical history, allergies, medications, social and family history as documented unless otherwise noted below. For Physician Assistant/ Nurse Practitioner cases/documentation I have personally evaluated this patient and have completed at least one if not all key elements of the E/M (history, physical exam, and MDM). Additional findings are as noted. Preliminary note started at 10:10 AM EDT    Primary Care Physician:  AFSANEH Albarado - CNP    Screenings:  [unfilled]    CHIEF COMPLAINT       Chief Complaint   Patient presents with    Vascular Access Problem     Dialysis port        RECENT VITALS:   /68   Pulse 68   Temp 97.8 °F (36.6 °C) (Oral)   Resp 17   Wt 220 lb (99.8 kg)   SpO2 92%   BMI 34.46 kg/m²     LABS:  Labs Reviewed - No data to display    Radiology  No orders to display       Attending Physician Additional  Notes    Patient was last dialysis treatment was 1/2-hour shorter due to low flow. Today when she presented to her dialysis center they were unable to access the catheter. She has a new left upper extremity AV graft which is fresh and has a thrill. She has no illness such as difficulty breathing orthopnea fever chills sweats numbness weakness paresthesias or abdominal pain. She is at her dry weight.   On exam she is nontoxic afebrile borderline blood pressure and heart

## 2023-05-25 PROBLEM — G47.33 OSA (OBSTRUCTIVE SLEEP APNEA): Status: ACTIVE | Noted: 2023-05-25

## 2023-05-31 ENCOUNTER — HOSPITAL ENCOUNTER (INPATIENT)
Age: 63
LOS: 8 days | Discharge: HOME HEALTH | End: 2023-06-08
Attending: EMERGENCY MEDICINE | Admitting: INTERNAL MEDICINE
Payer: COMMERCIAL

## 2023-05-31 ENCOUNTER — APPOINTMENT (OUTPATIENT)
Dept: CT IMAGING | Age: 63
End: 2023-05-31
Payer: COMMERCIAL

## 2023-05-31 DIAGNOSIS — R41.0 CONFUSION: Primary | ICD-10-CM

## 2023-05-31 PROBLEM — R41.82 AMS (ALTERED MENTAL STATUS): Status: ACTIVE | Noted: 2023-05-31

## 2023-05-31 LAB
ALBUMIN SERPL-MCNC: 3.6 G/DL (ref 3.5–5.2)
ALP SERPL-CCNC: 145 U/L (ref 35–104)
ALT SERPL-CCNC: 10 U/L (ref 5–33)
AMMONIA PLAS-SCNC: 21 UMOL/L (ref 11–51)
ANION GAP SERPL CALCULATED.3IONS-SCNC: 14 MMOL/L (ref 9–17)
AST SERPL-CCNC: 20 U/L
BASOPHILS # BLD: 0.1 K/UL (ref 0–0.2)
BASOPHILS NFR BLD: 1 % (ref 0–2)
BILIRUB SERPL-MCNC: 0.9 MG/DL (ref 0.3–1.2)
BUN SERPL-MCNC: 19 MG/DL (ref 8–23)
CALCIUM SERPL-MCNC: 9 MG/DL (ref 8.6–10.4)
CHLORIDE SERPL-SCNC: 94 MMOL/L (ref 98–107)
CO2 SERPL-SCNC: 29 MMOL/L (ref 20–31)
CREAT SERPL-MCNC: 1.78 MG/DL (ref 0.5–0.9)
EOSINOPHIL # BLD: 0.2 K/UL (ref 0–0.4)
EOSINOPHILS RELATIVE PERCENT: 2 % (ref 0–4)
ERYTHROCYTE [DISTWIDTH] IN BLOOD BY AUTOMATED COUNT: 16.5 % (ref 11.5–14.9)
GFR SERPL CREATININE-BSD FRML MDRD: 31 ML/MIN/1.73M2
GLUCOSE BLD-MCNC: 196 MG/DL (ref 65–105)
GLUCOSE SERPL-MCNC: 252 MG/DL (ref 70–99)
HCT VFR BLD AUTO: 30.7 % (ref 36–46)
HGB BLD-MCNC: 10 G/DL (ref 12–16)
LYMPHOCYTES # BLD: 14 % (ref 24–44)
LYMPHOCYTES NFR BLD: 1.3 K/UL (ref 1–4.8)
MAGNESIUM SERPL-MCNC: 2 MG/DL (ref 1.6–2.6)
MCH RBC QN AUTO: 30.2 PG (ref 26–34)
MCHC RBC AUTO-ENTMCNC: 32.7 G/DL (ref 31–37)
MCV RBC AUTO: 92.4 FL (ref 80–100)
MONOCYTES NFR BLD: 0.6 K/UL (ref 0.1–1.3)
MONOCYTES NFR BLD: 7 % (ref 1–7)
NEUTROPHILS NFR BLD: 76 % (ref 36–66)
NEUTS SEG NFR BLD: 6.8 K/UL (ref 1.3–9.1)
PLATELET # BLD AUTO: 216 K/UL (ref 150–450)
PMV BLD AUTO: 8.3 FL (ref 6–12)
POTASSIUM SERPL-SCNC: 3 MMOL/L (ref 3.7–5.3)
POTASSIUM SERPL-SCNC: 3 MMOL/L (ref 3.7–5.3)
PROT SERPL-MCNC: 7.2 G/DL (ref 6.4–8.3)
RBC # BLD AUTO: 3.32 M/UL (ref 4–5.2)
SODIUM SERPL-SCNC: 137 MMOL/L (ref 135–144)
WBC OTHER # BLD: 8.9 K/UL (ref 3.5–11)

## 2023-05-31 PROCEDURE — 70450 CT HEAD/BRAIN W/O DYE: CPT

## 2023-05-31 PROCEDURE — 6370000000 HC RX 637 (ALT 250 FOR IP): Performed by: NURSE PRACTITIONER

## 2023-05-31 PROCEDURE — 2060000000 HC ICU INTERMEDIATE R&B

## 2023-05-31 PROCEDURE — 36415 COLL VENOUS BLD VENIPUNCTURE: CPT

## 2023-05-31 PROCEDURE — 6360000002 HC RX W HCPCS: Performed by: INTERNAL MEDICINE

## 2023-05-31 PROCEDURE — 82140 ASSAY OF AMMONIA: CPT

## 2023-05-31 PROCEDURE — 82947 ASSAY GLUCOSE BLOOD QUANT: CPT

## 2023-05-31 PROCEDURE — 99285 EMERGENCY DEPT VISIT HI MDM: CPT

## 2023-05-31 PROCEDURE — 6370000000 HC RX 637 (ALT 250 FOR IP): Performed by: EMERGENCY MEDICINE

## 2023-05-31 PROCEDURE — 99223 1ST HOSP IP/OBS HIGH 75: CPT | Performed by: INTERNAL MEDICINE

## 2023-05-31 PROCEDURE — 93005 ELECTROCARDIOGRAM TRACING: CPT | Performed by: EMERGENCY MEDICINE

## 2023-05-31 PROCEDURE — 83735 ASSAY OF MAGNESIUM: CPT

## 2023-05-31 PROCEDURE — 84132 ASSAY OF SERUM POTASSIUM: CPT

## 2023-05-31 PROCEDURE — 85025 COMPLETE CBC W/AUTO DIFF WBC: CPT

## 2023-05-31 PROCEDURE — 80053 COMPREHEN METABOLIC PANEL: CPT

## 2023-05-31 PROCEDURE — 6370000000 HC RX 637 (ALT 250 FOR IP): Performed by: INTERNAL MEDICINE

## 2023-05-31 PROCEDURE — 2580000003 HC RX 258: Performed by: INTERNAL MEDICINE

## 2023-05-31 RX ORDER — MIDODRINE HYDROCHLORIDE 5 MG/1
5 TABLET ORAL PRN
COMMUNITY

## 2023-05-31 RX ORDER — POTASSIUM CHLORIDE 7.45 MG/ML
10 INJECTION INTRAVENOUS ONCE
Status: DISCONTINUED | OUTPATIENT
Start: 2023-05-31 | End: 2023-05-31

## 2023-05-31 RX ORDER — ASPIRIN 81 MG/1
81 TABLET, CHEWABLE ORAL DAILY
Status: DISCONTINUED | OUTPATIENT
Start: 2023-06-01 | End: 2023-06-08 | Stop reason: HOSPADM

## 2023-05-31 RX ORDER — POLYETHYLENE GLYCOL 3350 17 G/17G
17 POWDER, FOR SOLUTION ORAL DAILY PRN
Status: DISCONTINUED | OUTPATIENT
Start: 2023-05-31 | End: 2023-06-08 | Stop reason: HOSPADM

## 2023-05-31 RX ORDER — POTASSIUM CHLORIDE 20 MEQ/1
40 TABLET, EXTENDED RELEASE ORAL PRN
Status: DISCONTINUED | OUTPATIENT
Start: 2023-05-31 | End: 2023-06-01

## 2023-05-31 RX ORDER — CYCLOBENZAPRINE HCL 5 MG
5 TABLET ORAL 3 TIMES DAILY PRN
COMMUNITY

## 2023-05-31 RX ORDER — ALLOPURINOL 100 MG/1
100 TABLET ORAL DAILY
Status: DISCONTINUED | OUTPATIENT
Start: 2023-05-31 | End: 2023-05-31

## 2023-05-31 RX ORDER — ALLOPURINOL 100 MG/1
100 TABLET ORAL DAILY
Status: DISCONTINUED | OUTPATIENT
Start: 2023-06-01 | End: 2023-06-08 | Stop reason: HOSPADM

## 2023-05-31 RX ORDER — ACETAMINOPHEN 325 MG/1
650 TABLET ORAL EVERY 6 HOURS PRN
Status: DISCONTINUED | OUTPATIENT
Start: 2023-05-31 | End: 2023-06-08 | Stop reason: HOSPADM

## 2023-05-31 RX ORDER — SODIUM CHLORIDE 0.9 % (FLUSH) 0.9 %
5-40 SYRINGE (ML) INJECTION EVERY 12 HOURS SCHEDULED
Status: DISCONTINUED | OUTPATIENT
Start: 2023-05-31 | End: 2023-06-08 | Stop reason: HOSPADM

## 2023-05-31 RX ORDER — BUMETANIDE 1 MG/1
3 TABLET ORAL 2 TIMES DAILY
Status: DISCONTINUED | OUTPATIENT
Start: 2023-05-31 | End: 2023-06-08

## 2023-05-31 RX ORDER — CARVEDILOL 3.12 MG/1
3.12 TABLET ORAL 2 TIMES DAILY
Status: DISCONTINUED | OUTPATIENT
Start: 2023-05-31 | End: 2023-06-08 | Stop reason: HOSPADM

## 2023-05-31 RX ORDER — INSULIN GLARGINE 100 [IU]/ML
62 INJECTION, SOLUTION SUBCUTANEOUS 2 TIMES DAILY
COMMUNITY

## 2023-05-31 RX ORDER — BUMETANIDE 1 MG/1
1 TABLET ORAL 2 TIMES DAILY
COMMUNITY

## 2023-05-31 RX ORDER — BUSPIRONE HYDROCHLORIDE 5 MG/1
7.5 TABLET ORAL 3 TIMES DAILY
Status: DISCONTINUED | OUTPATIENT
Start: 2023-05-31 | End: 2023-06-08 | Stop reason: HOSPADM

## 2023-05-31 RX ORDER — ONDANSETRON 4 MG/1
4 TABLET, ORALLY DISINTEGRATING ORAL EVERY 8 HOURS PRN
Status: DISCONTINUED | OUTPATIENT
Start: 2023-05-31 | End: 2023-05-31 | Stop reason: SDUPTHER

## 2023-05-31 RX ORDER — SODIUM CHLORIDE 0.9 % (FLUSH) 0.9 %
5-40 SYRINGE (ML) INJECTION PRN
Status: DISCONTINUED | OUTPATIENT
Start: 2023-05-31 | End: 2023-06-08 | Stop reason: HOSPADM

## 2023-05-31 RX ORDER — ONDANSETRON 4 MG/1
4 TABLET, ORALLY DISINTEGRATING ORAL EVERY 8 HOURS PRN
Status: DISCONTINUED | OUTPATIENT
Start: 2023-05-31 | End: 2023-05-31

## 2023-05-31 RX ORDER — ONDANSETRON 2 MG/ML
4 INJECTION INTRAMUSCULAR; INTRAVENOUS EVERY 6 HOURS PRN
Status: DISCONTINUED | OUTPATIENT
Start: 2023-05-31 | End: 2023-05-31 | Stop reason: SDUPTHER

## 2023-05-31 RX ORDER — ASPIRIN 81 MG/1
81 TABLET ORAL DAILY
Status: DISCONTINUED | OUTPATIENT
Start: 2023-05-31 | End: 2023-05-31

## 2023-05-31 RX ORDER — ENOXAPARIN SODIUM 100 MG/ML
30 INJECTION SUBCUTANEOUS 2 TIMES DAILY
Status: DISCONTINUED | OUTPATIENT
Start: 2023-05-31 | End: 2023-05-31

## 2023-05-31 RX ORDER — ATORVASTATIN CALCIUM 80 MG/1
80 TABLET, FILM COATED ORAL NIGHTLY
Status: DISCONTINUED | OUTPATIENT
Start: 2023-05-31 | End: 2023-06-08 | Stop reason: HOSPADM

## 2023-05-31 RX ORDER — INSULIN GLARGINE 100 [IU]/ML
62 INJECTION, SOLUTION SUBCUTANEOUS 2 TIMES DAILY
Status: DISCONTINUED | OUTPATIENT
Start: 2023-05-31 | End: 2023-06-04

## 2023-05-31 RX ORDER — ACETAMINOPHEN 650 MG/1
650 SUPPOSITORY RECTAL EVERY 6 HOURS PRN
Status: DISCONTINUED | OUTPATIENT
Start: 2023-05-31 | End: 2023-06-08 | Stop reason: HOSPADM

## 2023-05-31 RX ORDER — BUMETANIDE 1 MG/1
2 TABLET ORAL 2 TIMES DAILY
Status: DISCONTINUED | OUTPATIENT
Start: 2023-05-31 | End: 2023-05-31

## 2023-05-31 RX ORDER — BUMETANIDE 2 MG/1
2 TABLET ORAL 2 TIMES DAILY
COMMUNITY

## 2023-05-31 RX ORDER — POTASSIUM CHLORIDE 20 MEQ/1
20 TABLET, EXTENDED RELEASE ORAL ONCE
Status: COMPLETED | OUTPATIENT
Start: 2023-05-31 | End: 2023-05-31

## 2023-05-31 RX ORDER — ASPIRIN 300 MG/1
300 SUPPOSITORY RECTAL DAILY
Status: DISCONTINUED | OUTPATIENT
Start: 2023-05-31 | End: 2023-05-31

## 2023-05-31 RX ORDER — SODIUM BICARBONATE 650 MG/1
1300 TABLET ORAL 2 TIMES DAILY
Status: DISCONTINUED | OUTPATIENT
Start: 2023-05-31 | End: 2023-06-08 | Stop reason: HOSPADM

## 2023-05-31 RX ORDER — POTASSIUM CHLORIDE 7.45 MG/ML
10 INJECTION INTRAVENOUS PRN
Status: DISCONTINUED | OUTPATIENT
Start: 2023-05-31 | End: 2023-06-01

## 2023-05-31 RX ORDER — SODIUM CHLORIDE 9 MG/ML
25 INJECTION, SOLUTION INTRAVENOUS PRN
Status: DISCONTINUED | OUTPATIENT
Start: 2023-05-31 | End: 2023-06-08 | Stop reason: HOSPADM

## 2023-05-31 RX ORDER — HEPARIN SODIUM 5000 [USP'U]/ML
5000 INJECTION, SOLUTION INTRAVENOUS; SUBCUTANEOUS EVERY 8 HOURS SCHEDULED
Status: DISCONTINUED | OUTPATIENT
Start: 2023-05-31 | End: 2023-06-08 | Stop reason: HOSPADM

## 2023-05-31 RX ORDER — ONDANSETRON 2 MG/ML
4 INJECTION INTRAMUSCULAR; INTRAVENOUS EVERY 6 HOURS PRN
Status: DISCONTINUED | OUTPATIENT
Start: 2023-05-31 | End: 2023-05-31

## 2023-05-31 RX ORDER — ATORVASTATIN CALCIUM 80 MG/1
80 TABLET, FILM COATED ORAL NIGHTLY
Status: DISCONTINUED | OUTPATIENT
Start: 2023-05-31 | End: 2023-05-31

## 2023-05-31 RX ORDER — ACETAMINOPHEN 325 MG/1
325 TABLET ORAL ONCE
Status: COMPLETED | OUTPATIENT
Start: 2023-05-31 | End: 2023-05-31

## 2023-05-31 RX ORDER — PANTOPRAZOLE SODIUM 40 MG/1
40 TABLET, DELAYED RELEASE ORAL
Status: DISCONTINUED | OUTPATIENT
Start: 2023-06-01 | End: 2023-06-08 | Stop reason: HOSPADM

## 2023-05-31 RX ORDER — POLYETHYLENE GLYCOL 3350 17 G/17G
17 POWDER, FOR SOLUTION ORAL DAILY PRN
Status: DISCONTINUED | OUTPATIENT
Start: 2023-05-31 | End: 2023-05-31 | Stop reason: SDUPTHER

## 2023-05-31 RX ADMIN — BUMETANIDE 3 MG: 1 TABLET ORAL at 21:37

## 2023-05-31 RX ADMIN — POTASSIUM CHLORIDE 20 MEQ: 1500 TABLET, EXTENDED RELEASE ORAL at 17:28

## 2023-05-31 RX ADMIN — BUSPIRONE HYDROCHLORIDE 7.5 MG: 5 TABLET ORAL at 21:36

## 2023-05-31 RX ADMIN — TICAGRELOR 90 MG: 90 TABLET ORAL at 21:37

## 2023-05-31 RX ADMIN — INSULIN GLARGINE 62 UNITS: 100 INJECTION, SOLUTION SUBCUTANEOUS at 21:38

## 2023-05-31 RX ADMIN — ATORVASTATIN CALCIUM 80 MG: 80 TABLET, FILM COATED ORAL at 21:37

## 2023-05-31 RX ADMIN — ACETAMINOPHEN 325 MG: 325 TABLET ORAL at 19:27

## 2023-05-31 RX ADMIN — CARVEDILOL 3.12 MG: 3.12 TABLET, FILM COATED ORAL at 21:37

## 2023-05-31 RX ADMIN — HEPARIN SODIUM 5000 UNITS: 5000 INJECTION INTRAVENOUS; SUBCUTANEOUS at 21:37

## 2023-05-31 RX ADMIN — SODIUM CHLORIDE, PRESERVATIVE FREE 10 ML: 5 INJECTION INTRAVENOUS at 21:40

## 2023-05-31 RX ADMIN — SODIUM BICARBONATE 1300 MG: 650 TABLET ORAL at 21:37

## 2023-05-31 ASSESSMENT — PAIN DESCRIPTION - FREQUENCY: FREQUENCY: CONTINUOUS

## 2023-05-31 ASSESSMENT — PAIN DESCRIPTION - DESCRIPTORS: DESCRIPTORS: PRESSURE

## 2023-05-31 ASSESSMENT — PAIN DESCRIPTION - ORIENTATION: ORIENTATION: MID

## 2023-05-31 ASSESSMENT — ENCOUNTER SYMPTOMS
COUGH: 0
SHORTNESS OF BREATH: 0
EYE PAIN: 0
VOMITING: 0
ABDOMINAL PAIN: 0
SORE THROAT: 0
EYE REDNESS: 0
BACK PAIN: 0
DIARRHEA: 0

## 2023-05-31 ASSESSMENT — PAIN SCALES - GENERAL: PAINLEVEL_OUTOF10: 3

## 2023-05-31 ASSESSMENT — PAIN - FUNCTIONAL ASSESSMENT
PAIN_FUNCTIONAL_ASSESSMENT: 0-10
PAIN_FUNCTIONAL_ASSESSMENT: ACTIVITIES ARE NOT PREVENTED
PAIN_FUNCTIONAL_ASSESSMENT: NONE - DENIES PAIN

## 2023-05-31 ASSESSMENT — PAIN DESCRIPTION - LOCATION: LOCATION: CHEST

## 2023-05-31 ASSESSMENT — PAIN DESCRIPTION - PAIN TYPE: TYPE: ACUTE PAIN

## 2023-05-31 ASSESSMENT — PAIN DESCRIPTION - ONSET: ONSET: ON-GOING

## 2023-06-01 ENCOUNTER — APPOINTMENT (OUTPATIENT)
Dept: MRI IMAGING | Age: 63
End: 2023-06-01
Payer: COMMERCIAL

## 2023-06-01 LAB
ANION GAP SERPL CALCULATED.3IONS-SCNC: 12 MMOL/L (ref 9–17)
BACTERIA URNS QL MICRO: ABNORMAL
BILIRUB UR QL STRIP: NEGATIVE
BUN SERPL-MCNC: 24 MG/DL (ref 8–23)
CALCIUM SERPL-MCNC: 9.1 MG/DL (ref 8.6–10.4)
CASTS #/AREA URNS LPF: ABNORMAL /LPF
CASTS #/AREA URNS LPF: ABNORMAL /LPF
CHLORIDE SERPL-SCNC: 94 MMOL/L (ref 98–107)
CHOLEST SERPL-MCNC: 110 MG/DL
CHOLESTEROL/HDL RATIO: 2.7
CLARITY UR: ABNORMAL
CO2 SERPL-SCNC: 29 MMOL/L (ref 20–31)
COLOR UR: YELLOW
CREAT SERPL-MCNC: 2.48 MG/DL (ref 0.5–0.9)
EKG ATRIAL RATE: 72 BPM
EKG P AXIS: 81 DEGREES
EKG P-R INTERVAL: 154 MS
EKG Q-T INTERVAL: 450 MS
EKG QRS DURATION: 102 MS
EKG QTC CALCULATION (BAZETT): 492 MS
EKG R AXIS: 30 DEGREES
EKG T AXIS: 146 DEGREES
EKG VENTRICULAR RATE: 72 BPM
EPI CELLS #/AREA URNS HPF: ABNORMAL /HPF
ERYTHROCYTE [DISTWIDTH] IN BLOOD BY AUTOMATED COUNT: 16.2 % (ref 11.5–14.9)
EST. AVERAGE GLUCOSE BLD GHB EST-MCNC: 169 MG/DL
GFR SERPL CREATININE-BSD FRML MDRD: 21 ML/MIN/1.73M2
GLUCOSE BLD-MCNC: 155 MG/DL (ref 65–105)
GLUCOSE BLD-MCNC: 305 MG/DL (ref 65–105)
GLUCOSE BLD-MCNC: 331 MG/DL (ref 65–105)
GLUCOSE BLD-MCNC: 434 MG/DL (ref 65–105)
GLUCOSE SERPL-MCNC: 169 MG/DL (ref 70–99)
GLUCOSE UR STRIP-MCNC: NEGATIVE MG/DL
HBA1C MFR BLD: 7.5 % (ref 4–6)
HCT VFR BLD AUTO: 30 % (ref 36–46)
HDLC SERPL-MCNC: 41 MG/DL
HGB BLD-MCNC: 10.1 G/DL (ref 12–16)
HGB UR QL STRIP.AUTO: NEGATIVE
KETONES UR STRIP-MCNC: NEGATIVE MG/DL
LDLC SERPL CALC-MCNC: 34 MG/DL (ref 0–130)
LEUKOCYTE ESTERASE UR QL STRIP: ABNORMAL
MCH RBC QN AUTO: 31.5 PG (ref 26–34)
MCHC RBC AUTO-ENTMCNC: 33.8 G/DL (ref 31–37)
MCV RBC AUTO: 93.1 FL (ref 80–100)
NITRITE UR QL STRIP: NEGATIVE
PH UR STRIP: 7.5 [PH] (ref 5–8)
PLATELET # BLD AUTO: 199 K/UL (ref 150–450)
PMV BLD AUTO: 8.2 FL (ref 6–12)
POTASSIUM SERPL-SCNC: 3.4 MMOL/L (ref 3.7–5.3)
POTASSIUM SERPL-SCNC: 4.2 MMOL/L (ref 3.7–5.3)
PROT UR STRIP-MCNC: ABNORMAL MG/DL
RBC # BLD AUTO: 3.22 M/UL (ref 4–5.2)
RBC #/AREA URNS HPF: ABNORMAL /HPF
SODIUM SERPL-SCNC: 135 MMOL/L (ref 135–144)
SP GR UR STRIP: 1.01 (ref 1–1.03)
TRIGL SERPL-MCNC: 176 MG/DL
UROBILINOGEN UR STRIP-ACNC: NORMAL
WBC #/AREA URNS HPF: ABNORMAL /HPF
WBC OTHER # BLD: 6.7 K/UL (ref 3.5–11)
YEAST URNS QL MICRO: ABNORMAL

## 2023-06-01 PROCEDURE — 97530 THERAPEUTIC ACTIVITIES: CPT

## 2023-06-01 PROCEDURE — 6360000002 HC RX W HCPCS: Performed by: NURSE PRACTITIONER

## 2023-06-01 PROCEDURE — 92523 SPEECH SOUND LANG COMPREHEN: CPT

## 2023-06-01 PROCEDURE — 6370000000 HC RX 637 (ALT 250 FOR IP): Performed by: NURSE PRACTITIONER

## 2023-06-01 PROCEDURE — 6360000002 HC RX W HCPCS: Performed by: INTERNAL MEDICINE

## 2023-06-01 PROCEDURE — 70551 MRI BRAIN STEM W/O DYE: CPT

## 2023-06-01 PROCEDURE — 99233 SBSQ HOSP IP/OBS HIGH 50: CPT | Performed by: INTERNAL MEDICINE

## 2023-06-01 PROCEDURE — 84132 ASSAY OF SERUM POTASSIUM: CPT

## 2023-06-01 PROCEDURE — 81001 URINALYSIS AUTO W/SCOPE: CPT

## 2023-06-01 PROCEDURE — 99222 1ST HOSP IP/OBS MODERATE 55: CPT | Performed by: PSYCHIATRY & NEUROLOGY

## 2023-06-01 PROCEDURE — 6370000000 HC RX 637 (ALT 250 FOR IP): Performed by: INTERNAL MEDICINE

## 2023-06-01 PROCEDURE — 85027 COMPLETE CBC AUTOMATED: CPT

## 2023-06-01 PROCEDURE — 2060000000 HC ICU INTERMEDIATE R&B

## 2023-06-01 PROCEDURE — 82947 ASSAY GLUCOSE BLOOD QUANT: CPT

## 2023-06-01 PROCEDURE — 83036 HEMOGLOBIN GLYCOSYLATED A1C: CPT

## 2023-06-01 PROCEDURE — 80048 BASIC METABOLIC PNL TOTAL CA: CPT

## 2023-06-01 PROCEDURE — 36415 COLL VENOUS BLD VENIPUNCTURE: CPT

## 2023-06-01 PROCEDURE — 80061 LIPID PANEL: CPT

## 2023-06-01 PROCEDURE — 93010 ELECTROCARDIOGRAM REPORT: CPT | Performed by: INTERNAL MEDICINE

## 2023-06-01 PROCEDURE — 2580000003 HC RX 258: Performed by: INTERNAL MEDICINE

## 2023-06-01 PROCEDURE — 97166 OT EVAL MOD COMPLEX 45 MIN: CPT

## 2023-06-01 PROCEDURE — 97162 PT EVAL MOD COMPLEX 30 MIN: CPT

## 2023-06-01 RX ORDER — INSULIN LISPRO 100 [IU]/ML
0-8 INJECTION, SOLUTION INTRAVENOUS; SUBCUTANEOUS
Status: DISCONTINUED | OUTPATIENT
Start: 2023-06-01 | End: 2023-06-04

## 2023-06-01 RX ORDER — DEXTROSE MONOHYDRATE 100 MG/ML
INJECTION, SOLUTION INTRAVENOUS CONTINUOUS PRN
Status: DISCONTINUED | OUTPATIENT
Start: 2023-06-01 | End: 2023-06-08 | Stop reason: HOSPADM

## 2023-06-01 RX ORDER — LANOLIN ALCOHOL/MO/W.PET/CERES
3 CREAM (GRAM) TOPICAL NIGHTLY PRN
Status: DISCONTINUED | OUTPATIENT
Start: 2023-06-01 | End: 2023-06-06

## 2023-06-01 RX ORDER — INSULIN LISPRO 100 [IU]/ML
0-4 INJECTION, SOLUTION INTRAVENOUS; SUBCUTANEOUS NIGHTLY
Status: DISCONTINUED | OUTPATIENT
Start: 2023-06-01 | End: 2023-06-04

## 2023-06-01 RX ADMIN — Medication 3 MG: at 22:48

## 2023-06-01 RX ADMIN — INSULIN LISPRO 6 UNITS: 100 INJECTION, SOLUTION INTRAVENOUS; SUBCUTANEOUS at 17:42

## 2023-06-01 RX ADMIN — CARVEDILOL 3.12 MG: 3.12 TABLET, FILM COATED ORAL at 08:08

## 2023-06-01 RX ADMIN — TICAGRELOR 90 MG: 90 TABLET ORAL at 20:12

## 2023-06-01 RX ADMIN — POTASSIUM CHLORIDE 10 MEQ: 7.46 INJECTION, SOLUTION INTRAVENOUS at 00:57

## 2023-06-01 RX ADMIN — SODIUM BICARBONATE 1300 MG: 650 TABLET ORAL at 20:12

## 2023-06-01 RX ADMIN — ALLOPURINOL 100 MG: 100 TABLET ORAL at 08:09

## 2023-06-01 RX ADMIN — POTASSIUM CHLORIDE 10 MEQ: 7.46 INJECTION, SOLUTION INTRAVENOUS at 10:26

## 2023-06-01 RX ADMIN — CEFTRIAXONE SODIUM 1000 MG: 1 INJECTION, POWDER, FOR SOLUTION INTRAMUSCULAR; INTRAVENOUS at 15:07

## 2023-06-01 RX ADMIN — POTASSIUM CHLORIDE 10 MEQ: 7.46 INJECTION, SOLUTION INTRAVENOUS at 02:06

## 2023-06-01 RX ADMIN — BUMETANIDE 3 MG: 1 TABLET ORAL at 20:12

## 2023-06-01 RX ADMIN — HEPARIN SODIUM 5000 UNITS: 5000 INJECTION INTRAVENOUS; SUBCUTANEOUS at 15:08

## 2023-06-01 RX ADMIN — INSULIN GLARGINE 62 UNITS: 100 INJECTION, SOLUTION SUBCUTANEOUS at 08:07

## 2023-06-01 RX ADMIN — POTASSIUM CHLORIDE 10 MEQ: 7.46 INJECTION, SOLUTION INTRAVENOUS at 06:32

## 2023-06-01 RX ADMIN — TICAGRELOR 90 MG: 90 TABLET ORAL at 08:09

## 2023-06-01 RX ADMIN — POTASSIUM CHLORIDE 10 MEQ: 7.46 INJECTION, SOLUTION INTRAVENOUS at 03:50

## 2023-06-01 RX ADMIN — PANTOPRAZOLE SODIUM 40 MG: 40 TABLET, DELAYED RELEASE ORAL at 06:33

## 2023-06-01 RX ADMIN — INSULIN GLARGINE 62 UNITS: 100 INJECTION, SOLUTION SUBCUTANEOUS at 20:36

## 2023-06-01 RX ADMIN — BUSPIRONE HYDROCHLORIDE 7.5 MG: 5 TABLET ORAL at 08:09

## 2023-06-01 RX ADMIN — HEPARIN SODIUM 5000 UNITS: 5000 INJECTION INTRAVENOUS; SUBCUTANEOUS at 06:32

## 2023-06-01 RX ADMIN — CARVEDILOL 3.12 MG: 3.12 TABLET, FILM COATED ORAL at 20:12

## 2023-06-01 RX ADMIN — SODIUM CHLORIDE, PRESERVATIVE FREE 10 ML: 5 INJECTION INTRAVENOUS at 20:13

## 2023-06-01 RX ADMIN — POTASSIUM CHLORIDE 10 MEQ: 7.46 INJECTION, SOLUTION INTRAVENOUS at 08:41

## 2023-06-01 RX ADMIN — ATORVASTATIN CALCIUM 80 MG: 80 TABLET, FILM COATED ORAL at 20:12

## 2023-06-01 RX ADMIN — BUMETANIDE 3 MG: 1 TABLET ORAL at 08:09

## 2023-06-01 RX ADMIN — BUSPIRONE HYDROCHLORIDE 7.5 MG: 5 TABLET ORAL at 15:12

## 2023-06-01 RX ADMIN — HEPARIN SODIUM 5000 UNITS: 5000 INJECTION INTRAVENOUS; SUBCUTANEOUS at 20:36

## 2023-06-01 RX ADMIN — BUSPIRONE HYDROCHLORIDE 7.5 MG: 5 TABLET ORAL at 20:11

## 2023-06-01 RX ADMIN — ASPIRIN 81 MG 81 MG: 81 TABLET ORAL at 08:09

## 2023-06-01 RX ADMIN — INSULIN LISPRO 4 UNITS: 100 INJECTION, SOLUTION INTRAVENOUS; SUBCUTANEOUS at 22:48

## 2023-06-01 RX ADMIN — SODIUM BICARBONATE 1300 MG: 650 TABLET ORAL at 08:09

## 2023-06-02 LAB
ANION GAP SERPL CALCULATED.3IONS-SCNC: 14 MMOL/L (ref 9–17)
BUN SERPL-MCNC: 36 MG/DL (ref 8–23)
CALCIUM SERPL-MCNC: 9 MG/DL (ref 8.6–10.4)
CHLORIDE SERPL-SCNC: 98 MMOL/L (ref 98–107)
CO2 SERPL-SCNC: 26 MMOL/L (ref 20–31)
CREAT SERPL-MCNC: 3.38 MG/DL (ref 0.5–0.9)
ERYTHROCYTE [DISTWIDTH] IN BLOOD BY AUTOMATED COUNT: 16.2 % (ref 11.5–14.9)
GFR SERPL CREATININE-BSD FRML MDRD: 14 ML/MIN/1.73M2
GLUCOSE BLD-MCNC: 261 MG/DL (ref 65–105)
GLUCOSE BLD-MCNC: 276 MG/DL (ref 65–105)
GLUCOSE BLD-MCNC: 301 MG/DL (ref 65–105)
GLUCOSE BLD-MCNC: 501 MG/DL (ref 65–105)
GLUCOSE SERPL-MCNC: 286 MG/DL (ref 70–99)
HCT VFR BLD AUTO: 30.3 % (ref 36–46)
HGB BLD-MCNC: 10.2 G/DL (ref 12–16)
MCH RBC QN AUTO: 31.9 PG (ref 26–34)
MCHC RBC AUTO-ENTMCNC: 33.6 G/DL (ref 31–37)
MCV RBC AUTO: 95 FL (ref 80–100)
PLATELET # BLD AUTO: 201 K/UL (ref 150–450)
PMV BLD AUTO: 8.1 FL (ref 6–12)
POTASSIUM SERPL-SCNC: 3.5 MMOL/L (ref 3.7–5.3)
RBC # BLD AUTO: 3.19 M/UL (ref 4–5.2)
SODIUM SERPL-SCNC: 138 MMOL/L (ref 135–144)
WBC OTHER # BLD: 6.1 K/UL (ref 3.5–11)

## 2023-06-02 PROCEDURE — 6360000002 HC RX W HCPCS: Performed by: INTERNAL MEDICINE

## 2023-06-02 PROCEDURE — 36415 COLL VENOUS BLD VENIPUNCTURE: CPT

## 2023-06-02 PROCEDURE — 2060000000 HC ICU INTERMEDIATE R&B

## 2023-06-02 PROCEDURE — 6370000000 HC RX 637 (ALT 250 FOR IP): Performed by: INTERNAL MEDICINE

## 2023-06-02 PROCEDURE — 90935 HEMODIALYSIS ONE EVALUATION: CPT

## 2023-06-02 PROCEDURE — 99232 SBSQ HOSP IP/OBS MODERATE 35: CPT | Performed by: INTERNAL MEDICINE

## 2023-06-02 PROCEDURE — 82947 ASSAY GLUCOSE BLOOD QUANT: CPT

## 2023-06-02 PROCEDURE — 6370000000 HC RX 637 (ALT 250 FOR IP): Performed by: NURSE PRACTITIONER

## 2023-06-02 PROCEDURE — 2500000003 HC RX 250 WO HCPCS: Performed by: INTERNAL MEDICINE

## 2023-06-02 PROCEDURE — 2580000003 HC RX 258: Performed by: INTERNAL MEDICINE

## 2023-06-02 PROCEDURE — 80048 BASIC METABOLIC PNL TOTAL CA: CPT

## 2023-06-02 PROCEDURE — 85027 COMPLETE CBC AUTOMATED: CPT

## 2023-06-02 RX ORDER — MIDODRINE HYDROCHLORIDE 5 MG/1
5 TABLET ORAL PRN
Status: DISCONTINUED | OUTPATIENT
Start: 2023-06-02 | End: 2023-06-06

## 2023-06-02 RX ORDER — ALBUMIN (HUMAN) 12.5 G/50ML
25 SOLUTION INTRAVENOUS AS NEEDED
Status: DISCONTINUED | OUTPATIENT
Start: 2023-06-02 | End: 2023-06-08 | Stop reason: HOSPADM

## 2023-06-02 RX ORDER — POTASSIUM CHLORIDE 20 MEQ/1
40 TABLET, EXTENDED RELEASE ORAL PRN
Status: DISCONTINUED | OUTPATIENT
Start: 2023-06-02 | End: 2023-06-02

## 2023-06-02 RX ORDER — POTASSIUM CHLORIDE 7.45 MG/ML
10 INJECTION INTRAVENOUS PRN
Status: DISCONTINUED | OUTPATIENT
Start: 2023-06-02 | End: 2023-06-02

## 2023-06-02 RX ORDER — INSULIN LISPRO 100 [IU]/ML
6 INJECTION, SOLUTION INTRAVENOUS; SUBCUTANEOUS ONCE
Status: COMPLETED | OUTPATIENT
Start: 2023-06-02 | End: 2023-06-02

## 2023-06-02 RX ADMIN — ATORVASTATIN CALCIUM 80 MG: 80 TABLET, FILM COATED ORAL at 20:25

## 2023-06-02 RX ADMIN — INSULIN GLARGINE 62 UNITS: 100 INJECTION, SOLUTION SUBCUTANEOUS at 21:48

## 2023-06-02 RX ADMIN — ACETAMINOPHEN 650 MG: 325 TABLET ORAL at 20:32

## 2023-06-02 RX ADMIN — CEFTRIAXONE SODIUM 1000 MG: 1 INJECTION, POWDER, FOR SOLUTION INTRAMUSCULAR; INTRAVENOUS at 16:30

## 2023-06-02 RX ADMIN — BUSPIRONE HYDROCHLORIDE 7.5 MG: 5 TABLET ORAL at 20:25

## 2023-06-02 RX ADMIN — BUMETANIDE 3 MG: 1 TABLET ORAL at 15:15

## 2023-06-02 RX ADMIN — SODIUM BICARBONATE 1300 MG: 650 TABLET ORAL at 20:25

## 2023-06-02 RX ADMIN — ALTEPLASE 2 MG: 2.2 INJECTION, POWDER, LYOPHILIZED, FOR SOLUTION INTRAVENOUS at 10:28

## 2023-06-02 RX ADMIN — SODIUM BICARBONATE 1300 MG: 650 TABLET ORAL at 15:15

## 2023-06-02 RX ADMIN — SODIUM CHLORIDE, PRESERVATIVE FREE 10 ML: 5 INJECTION INTRAVENOUS at 20:25

## 2023-06-02 RX ADMIN — CARVEDILOL 3.12 MG: 3.12 TABLET, FILM COATED ORAL at 20:25

## 2023-06-02 RX ADMIN — INSULIN LISPRO 8 UNITS: 100 INJECTION, SOLUTION INTRAVENOUS; SUBCUTANEOUS at 15:14

## 2023-06-02 RX ADMIN — Medication 1.9 ML: at 13:57

## 2023-06-02 RX ADMIN — PANTOPRAZOLE SODIUM 40 MG: 40 TABLET, DELAYED RELEASE ORAL at 05:13

## 2023-06-02 RX ADMIN — BUSPIRONE HYDROCHLORIDE 7.5 MG: 5 TABLET ORAL at 15:25

## 2023-06-02 RX ADMIN — Medication 2 ML: at 13:58

## 2023-06-02 RX ADMIN — INSULIN LISPRO 6 UNITS: 100 INJECTION, SOLUTION INTRAVENOUS; SUBCUTANEOUS at 21:48

## 2023-06-02 RX ADMIN — INSULIN LISPRO 4 UNITS: 100 INJECTION, SOLUTION INTRAVENOUS; SUBCUTANEOUS at 08:28

## 2023-06-02 RX ADMIN — CARVEDILOL 3.12 MG: 3.12 TABLET, FILM COATED ORAL at 15:15

## 2023-06-02 RX ADMIN — TICAGRELOR 90 MG: 90 TABLET ORAL at 15:15

## 2023-06-02 RX ADMIN — Medication 3 MG: at 21:53

## 2023-06-02 RX ADMIN — TICAGRELOR 90 MG: 90 TABLET ORAL at 20:25

## 2023-06-02 RX ADMIN — ASPIRIN 81 MG 81 MG: 81 TABLET ORAL at 15:15

## 2023-06-02 RX ADMIN — HEPARIN SODIUM 5000 UNITS: 5000 INJECTION INTRAVENOUS; SUBCUTANEOUS at 20:27

## 2023-06-02 RX ADMIN — HEPARIN SODIUM 5000 UNITS: 5000 INJECTION INTRAVENOUS; SUBCUTANEOUS at 05:13

## 2023-06-02 RX ADMIN — ALLOPURINOL 100 MG: 100 TABLET ORAL at 15:16

## 2023-06-02 RX ADMIN — HEPARIN SODIUM 5000 UNITS: 5000 INJECTION INTRAVENOUS; SUBCUTANEOUS at 15:16

## 2023-06-02 ASSESSMENT — PAIN DESCRIPTION - DESCRIPTORS: DESCRIPTORS: ACHING

## 2023-06-02 ASSESSMENT — PAIN DESCRIPTION - LOCATION: LOCATION: FOOT

## 2023-06-02 ASSESSMENT — PAIN SCALES - GENERAL: PAINLEVEL_OUTOF10: 8

## 2023-06-03 LAB
ANION GAP SERPL CALCULATED.3IONS-SCNC: 14 MMOL/L (ref 9–17)
BUN SERPL-MCNC: 26 MG/DL (ref 8–23)
CALCIUM SERPL-MCNC: 8.8 MG/DL (ref 8.6–10.4)
CHLORIDE SERPL-SCNC: 98 MMOL/L (ref 98–107)
CO2 SERPL-SCNC: 24 MMOL/L (ref 20–31)
CREAT SERPL-MCNC: 2.76 MG/DL (ref 0.5–0.9)
ERYTHROCYTE [DISTWIDTH] IN BLOOD BY AUTOMATED COUNT: 16.4 % (ref 11.5–14.9)
GFR SERPL CREATININE-BSD FRML MDRD: 18 ML/MIN/1.73M2
GLUCOSE BLD-MCNC: 387 MG/DL (ref 65–105)
GLUCOSE BLD-MCNC: 414 MG/DL (ref 65–105)
GLUCOSE BLD-MCNC: 448 MG/DL (ref 65–105)
GLUCOSE BLD-MCNC: 454 MG/DL (ref 65–105)
GLUCOSE BLD-MCNC: 521 MG/DL (ref 65–105)
GLUCOSE SERPL-MCNC: 499 MG/DL (ref 70–99)
HCT VFR BLD AUTO: 30.9 % (ref 36–46)
HGB BLD-MCNC: 10.4 G/DL (ref 12–16)
MCH RBC QN AUTO: 31.3 PG (ref 26–34)
MCHC RBC AUTO-ENTMCNC: 33.8 G/DL (ref 31–37)
MCV RBC AUTO: 92.7 FL (ref 80–100)
PLATELET # BLD AUTO: 207 K/UL (ref 150–450)
PMV BLD AUTO: 8 FL (ref 6–12)
POTASSIUM SERPL-SCNC: 4 MMOL/L (ref 3.7–5.3)
RBC # BLD AUTO: 3.33 M/UL (ref 4–5.2)
SODIUM SERPL-SCNC: 136 MMOL/L (ref 135–144)
WBC OTHER # BLD: 6 K/UL (ref 3.5–11)

## 2023-06-03 PROCEDURE — 2580000003 HC RX 258: Performed by: INTERNAL MEDICINE

## 2023-06-03 PROCEDURE — 6370000000 HC RX 637 (ALT 250 FOR IP): Performed by: NURSE PRACTITIONER

## 2023-06-03 PROCEDURE — 6360000002 HC RX W HCPCS: Performed by: INTERNAL MEDICINE

## 2023-06-03 PROCEDURE — 85027 COMPLETE CBC AUTOMATED: CPT

## 2023-06-03 PROCEDURE — 80048 BASIC METABOLIC PNL TOTAL CA: CPT

## 2023-06-03 PROCEDURE — 1200000000 HC SEMI PRIVATE

## 2023-06-03 PROCEDURE — 99232 SBSQ HOSP IP/OBS MODERATE 35: CPT | Performed by: INTERNAL MEDICINE

## 2023-06-03 PROCEDURE — 36415 COLL VENOUS BLD VENIPUNCTURE: CPT

## 2023-06-03 PROCEDURE — 82947 ASSAY GLUCOSE BLOOD QUANT: CPT

## 2023-06-03 PROCEDURE — 6370000000 HC RX 637 (ALT 250 FOR IP): Performed by: INTERNAL MEDICINE

## 2023-06-03 RX ORDER — INSULIN LISPRO 100 [IU]/ML
4 INJECTION, SOLUTION INTRAVENOUS; SUBCUTANEOUS ONCE
Status: COMPLETED | OUTPATIENT
Start: 2023-06-03 | End: 2023-06-03

## 2023-06-03 RX ADMIN — SODIUM BICARBONATE 1300 MG: 650 TABLET ORAL at 21:38

## 2023-06-03 RX ADMIN — CARVEDILOL 3.12 MG: 3.12 TABLET, FILM COATED ORAL at 08:06

## 2023-06-03 RX ADMIN — TICAGRELOR 90 MG: 90 TABLET ORAL at 08:05

## 2023-06-03 RX ADMIN — HEPARIN SODIUM 5000 UNITS: 5000 INJECTION INTRAVENOUS; SUBCUTANEOUS at 16:25

## 2023-06-03 RX ADMIN — BUSPIRONE HYDROCHLORIDE 7.5 MG: 5 TABLET ORAL at 16:25

## 2023-06-03 RX ADMIN — BUSPIRONE HYDROCHLORIDE 7.5 MG: 5 TABLET ORAL at 08:04

## 2023-06-03 RX ADMIN — CARVEDILOL 3.12 MG: 3.12 TABLET, FILM COATED ORAL at 21:38

## 2023-06-03 RX ADMIN — INSULIN GLARGINE 62 UNITS: 100 INJECTION, SOLUTION SUBCUTANEOUS at 08:06

## 2023-06-03 RX ADMIN — INSULIN GLARGINE 62 UNITS: 100 INJECTION, SOLUTION SUBCUTANEOUS at 21:38

## 2023-06-03 RX ADMIN — SODIUM CHLORIDE, PRESERVATIVE FREE 10 ML: 5 INJECTION INTRAVENOUS at 08:06

## 2023-06-03 RX ADMIN — ALLOPURINOL 100 MG: 100 TABLET ORAL at 08:04

## 2023-06-03 RX ADMIN — Medication 3 MG: at 21:44

## 2023-06-03 RX ADMIN — INSULIN LISPRO 8 UNITS: 100 INJECTION, SOLUTION INTRAVENOUS; SUBCUTANEOUS at 11:29

## 2023-06-03 RX ADMIN — SODIUM CHLORIDE, PRESERVATIVE FREE 10 ML: 5 INJECTION INTRAVENOUS at 21:39

## 2023-06-03 RX ADMIN — INSULIN LISPRO 8 UNITS: 100 INJECTION, SOLUTION INTRAVENOUS; SUBCUTANEOUS at 08:07

## 2023-06-03 RX ADMIN — ATORVASTATIN CALCIUM 80 MG: 80 TABLET, FILM COATED ORAL at 21:38

## 2023-06-03 RX ADMIN — INSULIN LISPRO 4 UNITS: 100 INJECTION, SOLUTION INTRAVENOUS; SUBCUTANEOUS at 08:06

## 2023-06-03 RX ADMIN — HEPARIN SODIUM 5000 UNITS: 5000 INJECTION INTRAVENOUS; SUBCUTANEOUS at 06:14

## 2023-06-03 RX ADMIN — PANTOPRAZOLE SODIUM 40 MG: 40 TABLET, DELAYED RELEASE ORAL at 06:14

## 2023-06-03 RX ADMIN — CEFTRIAXONE SODIUM 1000 MG: 1 INJECTION, POWDER, FOR SOLUTION INTRAMUSCULAR; INTRAVENOUS at 11:33

## 2023-06-03 RX ADMIN — BUMETANIDE 3 MG: 1 TABLET ORAL at 08:04

## 2023-06-03 RX ADMIN — SODIUM BICARBONATE 1300 MG: 650 TABLET ORAL at 08:05

## 2023-06-03 RX ADMIN — TICAGRELOR 90 MG: 90 TABLET ORAL at 21:38

## 2023-06-03 RX ADMIN — BUSPIRONE HYDROCHLORIDE 7.5 MG: 5 TABLET ORAL at 21:38

## 2023-06-03 RX ADMIN — INSULIN LISPRO 8 UNITS: 100 INJECTION, SOLUTION INTRAVENOUS; SUBCUTANEOUS at 16:25

## 2023-06-03 RX ADMIN — INSULIN LISPRO 4 UNITS: 100 INJECTION, SOLUTION INTRAVENOUS; SUBCUTANEOUS at 21:38

## 2023-06-03 RX ADMIN — ASPIRIN 81 MG 81 MG: 81 TABLET ORAL at 08:05

## 2023-06-03 RX ADMIN — HEPARIN SODIUM 5000 UNITS: 5000 INJECTION INTRAVENOUS; SUBCUTANEOUS at 21:38

## 2023-06-04 LAB
GLUCOSE BLD-MCNC: 301 MG/DL (ref 65–105)
GLUCOSE BLD-MCNC: 305 MG/DL (ref 65–105)
GLUCOSE BLD-MCNC: 410 MG/DL (ref 65–105)
GLUCOSE BLD-MCNC: 430 MG/DL (ref 65–105)
TROPONIN I SERPL HS-MCNC: 77 NG/L (ref 0–14)
TROPONIN I SERPL HS-MCNC: 81 NG/L (ref 0–14)

## 2023-06-04 PROCEDURE — 84484 ASSAY OF TROPONIN QUANT: CPT

## 2023-06-04 PROCEDURE — 6370000000 HC RX 637 (ALT 250 FOR IP): Performed by: NURSE PRACTITIONER

## 2023-06-04 PROCEDURE — 36415 COLL VENOUS BLD VENIPUNCTURE: CPT

## 2023-06-04 PROCEDURE — 6360000002 HC RX W HCPCS: Performed by: INTERNAL MEDICINE

## 2023-06-04 PROCEDURE — 6370000000 HC RX 637 (ALT 250 FOR IP): Performed by: INTERNAL MEDICINE

## 2023-06-04 PROCEDURE — 1200000000 HC SEMI PRIVATE

## 2023-06-04 PROCEDURE — 6370000000 HC RX 637 (ALT 250 FOR IP)

## 2023-06-04 PROCEDURE — 93005 ELECTROCARDIOGRAM TRACING: CPT

## 2023-06-04 PROCEDURE — 82947 ASSAY GLUCOSE BLOOD QUANT: CPT

## 2023-06-04 PROCEDURE — 2580000003 HC RX 258: Performed by: INTERNAL MEDICINE

## 2023-06-04 PROCEDURE — 97129 THER IVNTJ 1ST 15 MIN: CPT

## 2023-06-04 PROCEDURE — 97130 THER IVNTJ EA ADDL 15 MIN: CPT

## 2023-06-04 PROCEDURE — 99232 SBSQ HOSP IP/OBS MODERATE 35: CPT | Performed by: INTERNAL MEDICINE

## 2023-06-04 RX ORDER — POLYVINYL ALCOHOL 14 MG/ML
1 SOLUTION/ DROPS OPHTHALMIC EVERY 4 HOURS PRN
Status: DISCONTINUED | OUTPATIENT
Start: 2023-06-04 | End: 2023-06-08 | Stop reason: HOSPADM

## 2023-06-04 RX ORDER — INSULIN LISPRO 100 [IU]/ML
0-4 INJECTION, SOLUTION INTRAVENOUS; SUBCUTANEOUS NIGHTLY
Status: DISCONTINUED | OUTPATIENT
Start: 2023-06-04 | End: 2023-06-06

## 2023-06-04 RX ORDER — INSULIN GLARGINE 100 [IU]/ML
10 INJECTION, SOLUTION SUBCUTANEOUS ONCE
Status: COMPLETED | OUTPATIENT
Start: 2023-06-04 | End: 2023-06-04

## 2023-06-04 RX ORDER — INSULIN LISPRO 100 [IU]/ML
0-16 INJECTION, SOLUTION INTRAVENOUS; SUBCUTANEOUS
Status: DISCONTINUED | OUTPATIENT
Start: 2023-06-04 | End: 2023-06-08 | Stop reason: HOSPADM

## 2023-06-04 RX ORDER — ONDANSETRON 4 MG/1
4 TABLET, FILM COATED ORAL EVERY 8 HOURS PRN
Status: DISCONTINUED | OUTPATIENT
Start: 2023-06-04 | End: 2023-06-08 | Stop reason: HOSPADM

## 2023-06-04 RX ORDER — POLYVINYL ALCOHOL 14 MG/ML
1 SOLUTION/ DROPS OPHTHALMIC PRN
Status: DISCONTINUED | OUTPATIENT
Start: 2023-06-04 | End: 2023-06-04

## 2023-06-04 RX ORDER — ONDANSETRON 2 MG/ML
4 INJECTION INTRAMUSCULAR; INTRAVENOUS EVERY 6 HOURS PRN
Status: DISCONTINUED | OUTPATIENT
Start: 2023-06-04 | End: 2023-06-08 | Stop reason: HOSPADM

## 2023-06-04 RX ORDER — INSULIN GLARGINE 100 [IU]/ML
70 INJECTION, SOLUTION SUBCUTANEOUS 2 TIMES DAILY
Status: DISCONTINUED | OUTPATIENT
Start: 2023-06-04 | End: 2023-06-06

## 2023-06-04 RX ADMIN — INSULIN GLARGINE 70 UNITS: 100 INJECTION, SOLUTION SUBCUTANEOUS at 21:25

## 2023-06-04 RX ADMIN — INSULIN LISPRO 16 UNITS: 100 INJECTION, SOLUTION INTRAVENOUS; SUBCUTANEOUS at 11:34

## 2023-06-04 RX ADMIN — HEPARIN SODIUM 5000 UNITS: 5000 INJECTION INTRAVENOUS; SUBCUTANEOUS at 21:25

## 2023-06-04 RX ADMIN — PANTOPRAZOLE SODIUM 40 MG: 40 TABLET, DELAYED RELEASE ORAL at 06:05

## 2023-06-04 RX ADMIN — BUSPIRONE HYDROCHLORIDE 7.5 MG: 5 TABLET ORAL at 21:26

## 2023-06-04 RX ADMIN — Medication 3 MG: at 21:36

## 2023-06-04 RX ADMIN — HEPARIN SODIUM 5000 UNITS: 5000 INJECTION INTRAVENOUS; SUBCUTANEOUS at 15:47

## 2023-06-04 RX ADMIN — BUMETANIDE 3 MG: 1 TABLET ORAL at 08:14

## 2023-06-04 RX ADMIN — CEFTRIAXONE SODIUM 1000 MG: 1 INJECTION, POWDER, FOR SOLUTION INTRAMUSCULAR; INTRAVENOUS at 10:54

## 2023-06-04 RX ADMIN — CARVEDILOL 3.12 MG: 3.12 TABLET, FILM COATED ORAL at 08:14

## 2023-06-04 RX ADMIN — ASPIRIN 81 MG 81 MG: 81 TABLET ORAL at 08:15

## 2023-06-04 RX ADMIN — INSULIN GLARGINE 62 UNITS: 100 INJECTION, SOLUTION SUBCUTANEOUS at 08:15

## 2023-06-04 RX ADMIN — ATORVASTATIN CALCIUM 80 MG: 80 TABLET, FILM COATED ORAL at 21:26

## 2023-06-04 RX ADMIN — INSULIN GLARGINE 10 UNITS: 100 INJECTION, SOLUTION SUBCUTANEOUS at 11:35

## 2023-06-04 RX ADMIN — SODIUM BICARBONATE 1300 MG: 650 TABLET ORAL at 21:26

## 2023-06-04 RX ADMIN — SODIUM CHLORIDE, PRESERVATIVE FREE 10 ML: 5 INJECTION INTRAVENOUS at 21:42

## 2023-06-04 RX ADMIN — TICAGRELOR 90 MG: 90 TABLET ORAL at 21:26

## 2023-06-04 RX ADMIN — TICAGRELOR 90 MG: 90 TABLET ORAL at 08:14

## 2023-06-04 RX ADMIN — ALLOPURINOL 100 MG: 100 TABLET ORAL at 08:14

## 2023-06-04 RX ADMIN — INSULIN LISPRO 16 UNITS: 100 INJECTION, SOLUTION INTRAVENOUS; SUBCUTANEOUS at 16:07

## 2023-06-04 RX ADMIN — CARVEDILOL 3.12 MG: 3.12 TABLET, FILM COATED ORAL at 21:25

## 2023-06-04 RX ADMIN — POLYVINYL ALCOHOL 1 DROP: 14 SOLUTION/ DROPS OPHTHALMIC at 10:55

## 2023-06-04 RX ADMIN — INSULIN LISPRO 6 UNITS: 100 INJECTION, SOLUTION INTRAVENOUS; SUBCUTANEOUS at 08:15

## 2023-06-04 RX ADMIN — HEPARIN SODIUM 5000 UNITS: 5000 INJECTION INTRAVENOUS; SUBCUTANEOUS at 06:05

## 2023-06-04 RX ADMIN — INSULIN LISPRO 4 UNITS: 100 INJECTION, SOLUTION INTRAVENOUS; SUBCUTANEOUS at 21:25

## 2023-06-04 RX ADMIN — BUMETANIDE 3 MG: 1 TABLET ORAL at 15:47

## 2023-06-04 RX ADMIN — ONDANSETRON HYDROCHLORIDE 4 MG: 4 TABLET, FILM COATED ORAL at 09:51

## 2023-06-04 RX ADMIN — SODIUM BICARBONATE 1300 MG: 650 TABLET ORAL at 08:15

## 2023-06-04 RX ADMIN — BUSPIRONE HYDROCHLORIDE 7.5 MG: 5 TABLET ORAL at 08:15

## 2023-06-04 RX ADMIN — BUSPIRONE HYDROCHLORIDE 7.5 MG: 5 TABLET ORAL at 15:47

## 2023-06-04 RX ADMIN — SODIUM CHLORIDE, PRESERVATIVE FREE 10 ML: 5 INJECTION INTRAVENOUS at 08:16

## 2023-06-05 LAB
ALBUMIN SERPL-MCNC: 3.3 G/DL (ref 3.5–5.2)
ANION GAP SERPL CALCULATED.3IONS-SCNC: 17 MMOL/L (ref 9–17)
BASOPHILS # BLD: 0.1 K/UL (ref 0–0.2)
BASOPHILS NFR BLD: 1 % (ref 0–2)
BUN SERPL-MCNC: 37 MG/DL (ref 8–23)
CALCIUM SERPL-MCNC: 9.2 MG/DL (ref 8.6–10.4)
CHLORIDE SERPL-SCNC: 101 MMOL/L (ref 98–107)
CO2 SERPL-SCNC: 23 MMOL/L (ref 20–31)
CREAT SERPL-MCNC: 3.02 MG/DL (ref 0.5–0.9)
EOSINOPHIL # BLD: 0.3 K/UL (ref 0–0.4)
EOSINOPHILS RELATIVE PERCENT: 4 % (ref 0–4)
ERYTHROCYTE [DISTWIDTH] IN BLOOD BY AUTOMATED COUNT: 16.7 % (ref 11.5–14.9)
GFR SERPL CREATININE-BSD FRML MDRD: 17 ML/MIN/1.73M2
GLUCOSE BLD-MCNC: 111 MG/DL (ref 65–105)
GLUCOSE BLD-MCNC: 233 MG/DL (ref 65–105)
GLUCOSE BLD-MCNC: 294 MG/DL (ref 65–105)
GLUCOSE BLD-MCNC: 425 MG/DL (ref 65–105)
GLUCOSE SERPL-MCNC: 262 MG/DL (ref 70–99)
HCT VFR BLD AUTO: 29.9 % (ref 36–46)
HGB BLD-MCNC: 9.9 G/DL (ref 12–16)
LYMPHOCYTES # BLD: 16 % (ref 24–44)
LYMPHOCYTES NFR BLD: 1.1 K/UL (ref 1–4.8)
MCH RBC QN AUTO: 32.3 PG (ref 26–34)
MCHC RBC AUTO-ENTMCNC: 33.2 G/DL (ref 31–37)
MCV RBC AUTO: 97.1 FL (ref 80–100)
MONOCYTES NFR BLD: 0.4 K/UL (ref 0.1–1.3)
MONOCYTES NFR BLD: 6 % (ref 1–7)
NEUTROPHILS NFR BLD: 73 % (ref 36–66)
NEUTS SEG NFR BLD: 5.2 K/UL (ref 1.3–9.1)
PHOSPHATE SERPL-MCNC: 4.9 MG/DL (ref 2.6–4.5)
PLATELET # BLD AUTO: 194 K/UL (ref 150–450)
PMV BLD AUTO: 8 FL (ref 6–12)
POTASSIUM SERPL-SCNC: 3.9 MMOL/L (ref 3.7–5.3)
RBC # BLD AUTO: 3.08 M/UL (ref 4–5.2)
SODIUM SERPL-SCNC: 141 MMOL/L (ref 135–144)
WBC OTHER # BLD: 7.1 K/UL (ref 3.5–11)

## 2023-06-05 PROCEDURE — 85027 COMPLETE CBC AUTOMATED: CPT

## 2023-06-05 PROCEDURE — 6360000002 HC RX W HCPCS: Performed by: INTERNAL MEDICINE

## 2023-06-05 PROCEDURE — 90935 HEMODIALYSIS ONE EVALUATION: CPT

## 2023-06-05 PROCEDURE — 97129 THER IVNTJ 1ST 15 MIN: CPT

## 2023-06-05 PROCEDURE — 80069 RENAL FUNCTION PANEL: CPT

## 2023-06-05 PROCEDURE — 1200000000 HC SEMI PRIVATE

## 2023-06-05 PROCEDURE — 99254 IP/OBS CNSLTJ NEW/EST MOD 60: CPT | Performed by: INTERNAL MEDICINE

## 2023-06-05 PROCEDURE — 97530 THERAPEUTIC ACTIVITIES: CPT

## 2023-06-05 PROCEDURE — 2580000003 HC RX 258: Performed by: INTERNAL MEDICINE

## 2023-06-05 PROCEDURE — 6370000000 HC RX 637 (ALT 250 FOR IP): Performed by: NURSE PRACTITIONER

## 2023-06-05 PROCEDURE — 2500000003 HC RX 250 WO HCPCS: Performed by: INTERNAL MEDICINE

## 2023-06-05 PROCEDURE — 82947 ASSAY GLUCOSE BLOOD QUANT: CPT

## 2023-06-05 PROCEDURE — 36415 COLL VENOUS BLD VENIPUNCTURE: CPT

## 2023-06-05 PROCEDURE — 6370000000 HC RX 637 (ALT 250 FOR IP)

## 2023-06-05 PROCEDURE — 6370000000 HC RX 637 (ALT 250 FOR IP): Performed by: INTERNAL MEDICINE

## 2023-06-05 PROCEDURE — 97130 THER IVNTJ EA ADDL 15 MIN: CPT

## 2023-06-05 PROCEDURE — 97110 THERAPEUTIC EXERCISES: CPT

## 2023-06-05 RX ORDER — DOCUSATE SODIUM 100 MG/1
100 CAPSULE, LIQUID FILLED ORAL 2 TIMES DAILY
Status: DISCONTINUED | OUTPATIENT
Start: 2023-06-05 | End: 2023-06-06 | Stop reason: SDUPTHER

## 2023-06-05 RX ORDER — OXYCODONE HYDROCHLORIDE 5 MG/1
2.5 CAPSULE ORAL EVERY 4 HOURS PRN
COMMUNITY
End: 2023-06-09

## 2023-06-05 RX ADMIN — Medication 3 MG: at 22:22

## 2023-06-05 RX ADMIN — CARVEDILOL 3.12 MG: 3.12 TABLET, FILM COATED ORAL at 22:17

## 2023-06-05 RX ADMIN — ASPIRIN 81 MG 81 MG: 81 TABLET ORAL at 09:10

## 2023-06-05 RX ADMIN — SODIUM BICARBONATE 1300 MG: 650 TABLET ORAL at 09:10

## 2023-06-05 RX ADMIN — INSULIN GLARGINE 70 UNITS: 100 INJECTION, SOLUTION SUBCUTANEOUS at 07:35

## 2023-06-05 RX ADMIN — DOCUSATE SODIUM 100 MG: 100 CAPSULE, LIQUID FILLED ORAL at 23:31

## 2023-06-05 RX ADMIN — TICAGRELOR 90 MG: 90 TABLET ORAL at 09:18

## 2023-06-05 RX ADMIN — ATORVASTATIN CALCIUM 80 MG: 80 TABLET, FILM COATED ORAL at 22:17

## 2023-06-05 RX ADMIN — BUMETANIDE 3 MG: 1 TABLET ORAL at 09:10

## 2023-06-05 RX ADMIN — SODIUM CHLORIDE, PRESERVATIVE FREE 10 ML: 5 INJECTION INTRAVENOUS at 22:19

## 2023-06-05 RX ADMIN — POLYVINYL ALCOHOL 1 DROP: 14 SOLUTION/ DROPS OPHTHALMIC at 09:10

## 2023-06-05 RX ADMIN — SODIUM BICARBONATE 1300 MG: 650 TABLET ORAL at 22:17

## 2023-06-05 RX ADMIN — ACETAMINOPHEN 650 MG: 325 TABLET ORAL at 23:31

## 2023-06-05 RX ADMIN — CARVEDILOL 3.12 MG: 3.12 TABLET, FILM COATED ORAL at 09:11

## 2023-06-05 RX ADMIN — BUSPIRONE HYDROCHLORIDE 7.5 MG: 5 TABLET ORAL at 16:23

## 2023-06-05 RX ADMIN — HEPARIN SODIUM 5000 UNITS: 5000 INJECTION INTRAVENOUS; SUBCUTANEOUS at 22:19

## 2023-06-05 RX ADMIN — Medication 1.9 ML: at 15:17

## 2023-06-05 RX ADMIN — CEFTRIAXONE SODIUM 1000 MG: 1 INJECTION, POWDER, FOR SOLUTION INTRAMUSCULAR; INTRAVENOUS at 16:27

## 2023-06-05 RX ADMIN — SODIUM CHLORIDE, PRESERVATIVE FREE 10 ML: 5 INJECTION INTRAVENOUS at 09:15

## 2023-06-05 RX ADMIN — EPOETIN ALFA-EPBX 3000 UNITS: 3000 INJECTION, SOLUTION INTRAVENOUS; SUBCUTANEOUS at 18:41

## 2023-06-05 RX ADMIN — INSULIN GLARGINE 70 UNITS: 100 INJECTION, SOLUTION SUBCUTANEOUS at 22:18

## 2023-06-05 RX ADMIN — BUSPIRONE HYDROCHLORIDE 7.5 MG: 5 TABLET ORAL at 22:17

## 2023-06-05 RX ADMIN — PANTOPRAZOLE SODIUM 40 MG: 40 TABLET, DELAYED RELEASE ORAL at 06:14

## 2023-06-05 RX ADMIN — HEPARIN SODIUM 5000 UNITS: 5000 INJECTION INTRAVENOUS; SUBCUTANEOUS at 06:14

## 2023-06-05 RX ADMIN — Medication 2 ML: at 15:17

## 2023-06-05 RX ADMIN — BUMETANIDE 3 MG: 1 TABLET ORAL at 16:25

## 2023-06-05 RX ADMIN — TICAGRELOR 90 MG: 90 TABLET ORAL at 22:17

## 2023-06-05 RX ADMIN — ALLOPURINOL 100 MG: 100 TABLET ORAL at 09:10

## 2023-06-05 RX ADMIN — INSULIN LISPRO 16 UNITS: 100 INJECTION, SOLUTION INTRAVENOUS; SUBCUTANEOUS at 11:11

## 2023-06-05 RX ADMIN — HEPARIN SODIUM 5000 UNITS: 5000 INJECTION INTRAVENOUS; SUBCUTANEOUS at 16:24

## 2023-06-05 RX ADMIN — BUSPIRONE HYDROCHLORIDE 7.5 MG: 5 TABLET ORAL at 09:08

## 2023-06-05 ASSESSMENT — PAIN DESCRIPTION - ORIENTATION: ORIENTATION: RIGHT;LEFT

## 2023-06-05 ASSESSMENT — PAIN DESCRIPTION - LOCATION: LOCATION: LEG

## 2023-06-05 ASSESSMENT — PAIN SCALES - GENERAL: PAINLEVEL_OUTOF10: 6

## 2023-06-05 NOTE — PLAN OF CARE
Problem: Discharge Planning  Goal: Discharge to home or other facility with appropriate resources  Outcome: Progressing     Problem: Safety - Adult  Goal: Free from fall injury  Outcome: Progressing     Problem: ABCDS Injury Assessment  Goal: Absence of physical injury  Outcome: Progressing     Problem: Metabolic/Fluid and Electrolytes - Adult  Goal: Electrolytes maintained within normal limits  Outcome: Progressing  Goal: Hemodynamic stability and optimal renal function maintained  Outcome: Progressing  Goal: Glucose maintained within prescribed range  Outcome: Progressing     Problem: Chronic Conditions and Co-morbidities  Goal: Patient's chronic conditions and co-morbidity symptoms are monitored and maintained or improved  Outcome: Progressing

## 2023-06-05 NOTE — CONSULTS
eccentric. The lesion showed with  irregular contour, mild angulation and mild tortuosity. Devices used  Capital One Flex 180 cm. Number of passes: 1.   Euphora Balloon 2.5mm x 15mm. 3 inflation(s) to a max pressure of: 20 jayne.  [place] 2.75x22. 1 inflation(s) to a max pressure of: 20 jayne.  NC Euphora Balloon 3.0mm x 20mm. 2 inflation(s) to a max pressure of: 20 jayne        Labs:     CBC:   Recent Labs     06/03/23  0730 06/05/23  0546   WBC 6.0 7.1   HGB 10.4* 9.9*   HCT 30.9* 29.9*    194     BMP:   Recent Labs     06/03/23  0730 06/05/23  0546    141   K 4.0 3.9   CO2 24 23   BUN 26* 37*   CREATININE 2.76* 3.02*   LABGLOM 18* 17*   GLUCOSE 499* 262*     BNP: No results for input(s): BNP in the last 72 hours. PT/INR: No results for input(s): PROTIME, INR in the last 72 hours. APTT:No results for input(s): APTT in the last 72 hours. CARDIAC ENZYMES:No results for input(s): CKTOTAL, CKMB, CKMBINDEX, TROPONINI in the last 72 hours. FASTING LIPID PANEL:  Lab Results   Component Value Date/Time    HDL 41 06/01/2023 05:39 AM    LDLCALC 28 04/09/2015 12:00 AM    TRIG 176 06/01/2023 05:39 AM     LIVER PROFILE:  Recent Labs     06/05/23  0546   LABALBU 3.3*         IMPRESSION:    Transient amnesia/confusion, now resolved   MV CAD with hx of CABG and recent PCI with FADIA to mid-RCA on 2/14/23  Chronic troponin elevation, stable and c/w reduced renal clearance; no evidence of acute coronary syndrome  ESRD on HD   HTN  Type II DM  ERICK  Morbid obesity with BMI > 40  Hx of DVT      RECOMMENDATIONS:  Neurology work up in progress   Continue asa, brilinta, statin and coreg  Will add Imdur if any further episodes of chest pressure  Cardiology to sign off. Op follow up as scheduled.        Camilo Montelongo MD, [facility]

## 2023-06-06 LAB
EKG ATRIAL RATE: 68 BPM
EKG P AXIS: 45 DEGREES
EKG P-R INTERVAL: 158 MS
EKG Q-T INTERVAL: 444 MS
EKG QRS DURATION: 92 MS
EKG QTC CALCULATION (BAZETT): 472 MS
EKG R AXIS: 3 DEGREES
EKG T AXIS: 172 DEGREES
EKG VENTRICULAR RATE: 68 BPM
GLUCOSE BLD-MCNC: 278 MG/DL (ref 65–105)
GLUCOSE BLD-MCNC: 286 MG/DL (ref 65–105)
GLUCOSE BLD-MCNC: 355 MG/DL (ref 65–105)
GLUCOSE BLD-MCNC: 459 MG/DL (ref 65–105)

## 2023-06-06 PROCEDURE — 97110 THERAPEUTIC EXERCISES: CPT

## 2023-06-06 PROCEDURE — 6360000002 HC RX W HCPCS: Performed by: INTERNAL MEDICINE

## 2023-06-06 PROCEDURE — 97129 THER IVNTJ 1ST 15 MIN: CPT

## 2023-06-06 PROCEDURE — 97130 THER IVNTJ EA ADDL 15 MIN: CPT

## 2023-06-06 PROCEDURE — 6370000000 HC RX 637 (ALT 250 FOR IP): Performed by: INTERNAL MEDICINE

## 2023-06-06 PROCEDURE — 93010 ELECTROCARDIOGRAM REPORT: CPT | Performed by: INTERNAL MEDICINE

## 2023-06-06 PROCEDURE — 1200000000 HC SEMI PRIVATE

## 2023-06-06 PROCEDURE — 99232 SBSQ HOSP IP/OBS MODERATE 35: CPT | Performed by: INTERNAL MEDICINE

## 2023-06-06 PROCEDURE — 6370000000 HC RX 637 (ALT 250 FOR IP)

## 2023-06-06 PROCEDURE — 97116 GAIT TRAINING THERAPY: CPT

## 2023-06-06 PROCEDURE — 2580000003 HC RX 258: Performed by: INTERNAL MEDICINE

## 2023-06-06 PROCEDURE — 97530 THERAPEUTIC ACTIVITIES: CPT

## 2023-06-06 PROCEDURE — 82947 ASSAY GLUCOSE BLOOD QUANT: CPT

## 2023-06-06 PROCEDURE — 6370000000 HC RX 637 (ALT 250 FOR IP): Performed by: NURSE PRACTITIONER

## 2023-06-06 PROCEDURE — 97535 SELF CARE MNGMENT TRAINING: CPT

## 2023-06-06 RX ORDER — GABAPENTIN 100 MG/1
100 CAPSULE ORAL DAILY
Status: DISCONTINUED | OUTPATIENT
Start: 2023-06-06 | End: 2023-06-08 | Stop reason: HOSPADM

## 2023-06-06 RX ORDER — ACETAMINOPHEN 325 MG/1
650 TABLET ORAL EVERY 6 HOURS PRN
Status: DISCONTINUED | OUTPATIENT
Start: 2023-06-06 | End: 2023-06-06 | Stop reason: SDUPTHER

## 2023-06-06 RX ORDER — DEXTROMETHORPHAN POLISTIREX 30 MG/5ML
30 SUSPENSION ORAL EVERY 12 HOURS PRN
Status: DISCONTINUED | OUTPATIENT
Start: 2023-06-06 | End: 2023-06-08 | Stop reason: HOSPADM

## 2023-06-06 RX ORDER — INSULIN GLARGINE 100 [IU]/ML
60 INJECTION, SOLUTION SUBCUTANEOUS
Status: DISCONTINUED | OUTPATIENT
Start: 2023-06-07 | End: 2023-06-08 | Stop reason: HOSPADM

## 2023-06-06 RX ORDER — VENLAFAXINE HYDROCHLORIDE 37.5 MG/1
37.5 CAPSULE, EXTENDED RELEASE ORAL
Status: DISCONTINUED | OUTPATIENT
Start: 2023-06-07 | End: 2023-06-08 | Stop reason: HOSPADM

## 2023-06-06 RX ORDER — INSULIN GLARGINE 100 [IU]/ML
70 INJECTION, SOLUTION SUBCUTANEOUS NIGHTLY
Status: DISCONTINUED | OUTPATIENT
Start: 2023-06-07 | End: 2023-06-06

## 2023-06-06 RX ORDER — TAMSULOSIN HYDROCHLORIDE 0.4 MG/1
0.4 CAPSULE ORAL DAILY
Status: DISCONTINUED | OUTPATIENT
Start: 2023-06-06 | End: 2023-06-08 | Stop reason: HOSPADM

## 2023-06-06 RX ORDER — LANOLIN ALCOHOL/MO/W.PET/CERES
9 CREAM (GRAM) TOPICAL NIGHTLY PRN
Status: DISCONTINUED | OUTPATIENT
Start: 2023-06-06 | End: 2023-06-08 | Stop reason: HOSPADM

## 2023-06-06 RX ORDER — INSULIN GLARGINE 100 [IU]/ML
70 INJECTION, SOLUTION SUBCUTANEOUS NIGHTLY
Status: DISCONTINUED | OUTPATIENT
Start: 2023-06-06 | End: 2023-06-08 | Stop reason: HOSPADM

## 2023-06-06 RX ORDER — MIDODRINE HYDROCHLORIDE 5 MG/1
5 TABLET ORAL PRN
Status: DISCONTINUED | OUTPATIENT
Start: 2023-06-06 | End: 2023-06-08 | Stop reason: HOSPADM

## 2023-06-06 RX ORDER — CYCLOBENZAPRINE HCL 10 MG
5 TABLET ORAL 3 TIMES DAILY PRN
Status: DISCONTINUED | OUTPATIENT
Start: 2023-06-06 | End: 2023-06-08 | Stop reason: HOSPADM

## 2023-06-06 RX ORDER — INSULIN LISPRO 100 [IU]/ML
15 INJECTION, SOLUTION INTRAVENOUS; SUBCUTANEOUS
Status: DISCONTINUED | OUTPATIENT
Start: 2023-06-07 | End: 2023-06-08 | Stop reason: HOSPADM

## 2023-06-06 RX ORDER — DOCUSATE SODIUM 100 MG/1
100 CAPSULE, LIQUID FILLED ORAL 2 TIMES DAILY
Status: DISCONTINUED | OUTPATIENT
Start: 2023-06-06 | End: 2023-06-08 | Stop reason: HOSPADM

## 2023-06-06 RX ADMIN — INSULIN GLARGINE 70 UNITS: 100 INJECTION, SOLUTION SUBCUTANEOUS at 08:03

## 2023-06-06 RX ADMIN — ALLOPURINOL 100 MG: 100 TABLET ORAL at 08:02

## 2023-06-06 RX ADMIN — SODIUM BICARBONATE 1300 MG: 650 TABLET ORAL at 08:03

## 2023-06-06 RX ADMIN — ATORVASTATIN CALCIUM 80 MG: 80 TABLET, FILM COATED ORAL at 21:23

## 2023-06-06 RX ADMIN — BUSPIRONE HYDROCHLORIDE 7.5 MG: 5 TABLET ORAL at 08:03

## 2023-06-06 RX ADMIN — INSULIN LISPRO 8 UNITS: 100 INJECTION, SOLUTION INTRAVENOUS; SUBCUTANEOUS at 08:03

## 2023-06-06 RX ADMIN — DOCUSATE SODIUM 100 MG: 100 CAPSULE, LIQUID FILLED ORAL at 08:03

## 2023-06-06 RX ADMIN — Medication 9 MG: at 21:23

## 2023-06-06 RX ADMIN — HEPARIN SODIUM 5000 UNITS: 5000 INJECTION INTRAVENOUS; SUBCUTANEOUS at 14:05

## 2023-06-06 RX ADMIN — HEPARIN SODIUM 5000 UNITS: 5000 INJECTION INTRAVENOUS; SUBCUTANEOUS at 06:13

## 2023-06-06 RX ADMIN — SODIUM CHLORIDE, PRESERVATIVE FREE 10 ML: 5 INJECTION INTRAVENOUS at 08:04

## 2023-06-06 RX ADMIN — INSULIN LISPRO 16 UNITS: 100 INJECTION, SOLUTION INTRAVENOUS; SUBCUTANEOUS at 12:18

## 2023-06-06 RX ADMIN — SODIUM BICARBONATE 1300 MG: 650 TABLET ORAL at 21:23

## 2023-06-06 RX ADMIN — CARVEDILOL 3.12 MG: 3.12 TABLET, FILM COATED ORAL at 08:03

## 2023-06-06 RX ADMIN — GABAPENTIN 100 MG: 100 CAPSULE ORAL at 12:18

## 2023-06-06 RX ADMIN — BUSPIRONE HYDROCHLORIDE 7.5 MG: 5 TABLET ORAL at 21:23

## 2023-06-06 RX ADMIN — BUMETANIDE 3 MG: 1 TABLET ORAL at 08:03

## 2023-06-06 RX ADMIN — SODIUM CHLORIDE, PRESERVATIVE FREE 10 ML: 5 INJECTION INTRAVENOUS at 21:30

## 2023-06-06 RX ADMIN — TAMSULOSIN HYDROCHLORIDE 0.4 MG: 0.4 CAPSULE ORAL at 12:17

## 2023-06-06 RX ADMIN — CEFTRIAXONE SODIUM 1000 MG: 1 INJECTION, POWDER, FOR SOLUTION INTRAMUSCULAR; INTRAVENOUS at 12:19

## 2023-06-06 RX ADMIN — Medication 30 MG: at 21:46

## 2023-06-06 RX ADMIN — TICAGRELOR 90 MG: 90 TABLET ORAL at 21:23

## 2023-06-06 RX ADMIN — PANTOPRAZOLE SODIUM 40 MG: 40 TABLET, DELAYED RELEASE ORAL at 06:13

## 2023-06-06 RX ADMIN — ASPIRIN 81 MG 81 MG: 81 TABLET ORAL at 08:03

## 2023-06-06 RX ADMIN — TICAGRELOR 90 MG: 90 TABLET ORAL at 08:03

## 2023-06-06 RX ADMIN — BUMETANIDE 3 MG: 1 TABLET ORAL at 16:40

## 2023-06-06 RX ADMIN — BUSPIRONE HYDROCHLORIDE 7.5 MG: 5 TABLET ORAL at 14:05

## 2023-06-06 RX ADMIN — DOCUSATE SODIUM 100 MG: 100 CAPSULE, LIQUID FILLED ORAL at 21:23

## 2023-06-06 RX ADMIN — CARVEDILOL 3.12 MG: 3.12 TABLET, FILM COATED ORAL at 21:23

## 2023-06-06 RX ADMIN — INSULIN LISPRO 16 UNITS: 100 INJECTION, SOLUTION INTRAVENOUS; SUBCUTANEOUS at 16:40

## 2023-06-06 RX ADMIN — INSULIN GLARGINE 70 UNITS: 100 INJECTION, SOLUTION SUBCUTANEOUS at 21:23

## 2023-06-06 RX ADMIN — HEPARIN SODIUM 5000 UNITS: 5000 INJECTION INTRAVENOUS; SUBCUTANEOUS at 21:24

## 2023-06-06 NOTE — PLAN OF CARE
Problem: Discharge Planning  Goal: Discharge to home or other facility with appropriate resources  Outcome: Progressing     Problem: Safety - Adult  Goal: Free from fall injury  Outcome: Progressing     Problem: ABCDS Injury Assessment  Goal: Absence of physical injury  Outcome: Progressing     Problem: Metabolic/Fluid and Electrolytes - Adult  Goal: Electrolytes maintained within normal limits  Outcome: Progressing  Goal: Hemodynamic stability and optimal renal function maintained  Outcome: Progressing  Goal: Glucose maintained within prescribed range  Outcome: Progressing     Problem: Neurosensory - Adult  Goal: Achieves maximal functionality and self care  Outcome: Progressing     Problem: Skin/Tissue Integrity - Adult  Goal: Skin integrity remains intact  Outcome: Progressing     Problem: Infection - Adult  Goal: Absence of infection at discharge  Outcome: Progressing     Problem: Chronic Conditions and Co-morbidities  Goal: Patient's chronic conditions and co-morbidity symptoms are monitored and maintained or improved  Outcome: Progressing

## 2023-06-07 ENCOUNTER — APPOINTMENT (OUTPATIENT)
Dept: INTERVENTIONAL RADIOLOGY/VASCULAR | Age: 63
End: 2023-06-07
Payer: COMMERCIAL

## 2023-06-07 LAB
ANION GAP SERPL CALCULATED.3IONS-SCNC: 13 MMOL/L (ref 9–17)
BUN SERPL-MCNC: 32 MG/DL (ref 8–23)
CALCIUM SERPL-MCNC: 9.1 MG/DL (ref 8.6–10.4)
CHLORIDE SERPL-SCNC: 105 MMOL/L (ref 98–107)
CO2 SERPL-SCNC: 23 MMOL/L (ref 20–31)
CREAT SERPL-MCNC: 2.6 MG/DL (ref 0.5–0.9)
ERYTHROCYTE [DISTWIDTH] IN BLOOD BY AUTOMATED COUNT: 16.9 % (ref 11.5–14.9)
GFR SERPL CREATININE-BSD FRML MDRD: 20 ML/MIN/1.73M2
GLUCOSE BLD-MCNC: 166 MG/DL (ref 65–105)
GLUCOSE BLD-MCNC: 200 MG/DL (ref 65–105)
GLUCOSE BLD-MCNC: 238 MG/DL (ref 65–105)
GLUCOSE BLD-MCNC: 316 MG/DL (ref 65–105)
GLUCOSE SERPL-MCNC: 205 MG/DL (ref 70–99)
HCT VFR BLD AUTO: 27.7 % (ref 36–46)
HGB BLD-MCNC: 9.5 G/DL (ref 12–16)
MCH RBC QN AUTO: 32.5 PG (ref 26–34)
MCHC RBC AUTO-ENTMCNC: 34.3 G/DL (ref 31–37)
MCV RBC AUTO: 94.8 FL (ref 80–100)
PLATELET # BLD AUTO: 159 K/UL (ref 150–450)
PMV BLD AUTO: 8.2 FL (ref 6–12)
POTASSIUM SERPL-SCNC: 3.9 MMOL/L (ref 3.7–5.3)
RBC # BLD AUTO: 2.93 M/UL (ref 4–5.2)
SODIUM SERPL-SCNC: 141 MMOL/L (ref 135–144)
WBC OTHER # BLD: 6.1 K/UL (ref 3.5–11)

## 2023-06-07 PROCEDURE — 6370000000 HC RX 637 (ALT 250 FOR IP): Performed by: INTERNAL MEDICINE

## 2023-06-07 PROCEDURE — 99239 HOSP IP/OBS DSCHRG MGMT >30: CPT | Performed by: INTERNAL MEDICINE

## 2023-06-07 PROCEDURE — 97530 THERAPEUTIC ACTIVITIES: CPT

## 2023-06-07 PROCEDURE — 1200000000 HC SEMI PRIVATE

## 2023-06-07 PROCEDURE — 6370000000 HC RX 637 (ALT 250 FOR IP): Performed by: NURSE PRACTITIONER

## 2023-06-07 PROCEDURE — 97116 GAIT TRAINING THERAPY: CPT

## 2023-06-07 PROCEDURE — 80048 BASIC METABOLIC PNL TOTAL CA: CPT

## 2023-06-07 PROCEDURE — 6360000002 HC RX W HCPCS: Performed by: INTERNAL MEDICINE

## 2023-06-07 PROCEDURE — 82947 ASSAY GLUCOSE BLOOD QUANT: CPT

## 2023-06-07 PROCEDURE — 2500000003 HC RX 250 WO HCPCS: Performed by: INTERNAL MEDICINE

## 2023-06-07 PROCEDURE — 36415 COLL VENOUS BLD VENIPUNCTURE: CPT

## 2023-06-07 PROCEDURE — 6370000000 HC RX 637 (ALT 250 FOR IP)

## 2023-06-07 PROCEDURE — 36581 REPLACE TUNNELED CV CATH: CPT

## 2023-06-07 PROCEDURE — 85027 COMPLETE CBC AUTOMATED: CPT

## 2023-06-07 PROCEDURE — 77001 FLUOROGUIDE FOR VEIN DEVICE: CPT

## 2023-06-07 PROCEDURE — 2580000003 HC RX 258: Performed by: INTERNAL MEDICINE

## 2023-06-07 PROCEDURE — 90935 HEMODIALYSIS ONE EVALUATION: CPT

## 2023-06-07 PROCEDURE — C1769 GUIDE WIRE: HCPCS

## 2023-06-07 PROCEDURE — 97110 THERAPEUTIC EXERCISES: CPT

## 2023-06-07 RX ORDER — POLYVINYL ALCOHOL 14 MG/ML
1 SOLUTION/ DROPS OPHTHALMIC EVERY 4 HOURS PRN
Qty: 15 ML | Refills: 1 | Status: SHIPPED | OUTPATIENT
Start: 2023-06-07 | End: 2023-07-07

## 2023-06-07 RX ADMIN — INSULIN LISPRO 15 UNITS: 100 INJECTION, SOLUTION INTRAVENOUS; SUBCUTANEOUS at 20:51

## 2023-06-07 RX ADMIN — INSULIN LISPRO 15 UNITS: 100 INJECTION, SOLUTION INTRAVENOUS; SUBCUTANEOUS at 17:51

## 2023-06-07 RX ADMIN — BUMETANIDE 3 MG: 1 TABLET ORAL at 17:51

## 2023-06-07 RX ADMIN — SODIUM BICARBONATE 1300 MG: 650 TABLET ORAL at 20:51

## 2023-06-07 RX ADMIN — CARVEDILOL 3.12 MG: 3.12 TABLET, FILM COATED ORAL at 08:20

## 2023-06-07 RX ADMIN — SODIUM BICARBONATE 1300 MG: 650 TABLET ORAL at 08:20

## 2023-06-07 RX ADMIN — CARVEDILOL 3.12 MG: 3.12 TABLET, FILM COATED ORAL at 20:51

## 2023-06-07 RX ADMIN — HEPARIN SODIUM 5000 UNITS: 5000 INJECTION INTRAVENOUS; SUBCUTANEOUS at 06:17

## 2023-06-07 RX ADMIN — HEPARIN SODIUM 5000 UNITS: 5000 INJECTION INTRAVENOUS; SUBCUTANEOUS at 20:51

## 2023-06-07 RX ADMIN — INSULIN LISPRO 15 UNITS: 100 INJECTION, SOLUTION INTRAVENOUS; SUBCUTANEOUS at 06:17

## 2023-06-07 RX ADMIN — Medication 1.9 ML: at 16:00

## 2023-06-07 RX ADMIN — BUMETANIDE 3 MG: 1 TABLET ORAL at 08:20

## 2023-06-07 RX ADMIN — BUSPIRONE HYDROCHLORIDE 7.5 MG: 5 TABLET ORAL at 13:51

## 2023-06-07 RX ADMIN — VENLAFAXINE HYDROCHLORIDE 37.5 MG: 37.5 CAPSULE, EXTENDED RELEASE ORAL at 08:26

## 2023-06-07 RX ADMIN — ATORVASTATIN CALCIUM 80 MG: 80 TABLET, FILM COATED ORAL at 20:50

## 2023-06-07 RX ADMIN — TAMSULOSIN HYDROCHLORIDE 0.4 MG: 0.4 CAPSULE ORAL at 08:20

## 2023-06-07 RX ADMIN — SODIUM CHLORIDE, PRESERVATIVE FREE 10 ML: 5 INJECTION INTRAVENOUS at 08:26

## 2023-06-07 RX ADMIN — INSULIN LISPRO 4 UNITS: 100 INJECTION, SOLUTION INTRAVENOUS; SUBCUTANEOUS at 11:42

## 2023-06-07 RX ADMIN — GABAPENTIN 100 MG: 100 CAPSULE ORAL at 08:20

## 2023-06-07 RX ADMIN — Medication 9 MG: at 20:50

## 2023-06-07 RX ADMIN — INSULIN GLARGINE 60 UNITS: 100 INJECTION, SOLUTION SUBCUTANEOUS at 08:19

## 2023-06-07 RX ADMIN — CEFTRIAXONE SODIUM 1000 MG: 1 INJECTION, POWDER, FOR SOLUTION INTRAMUSCULAR; INTRAVENOUS at 13:48

## 2023-06-07 RX ADMIN — INSULIN LISPRO 4 UNITS: 100 INJECTION, SOLUTION INTRAVENOUS; SUBCUTANEOUS at 17:52

## 2023-06-07 RX ADMIN — ASPIRIN 81 MG 81 MG: 81 TABLET ORAL at 08:20

## 2023-06-07 RX ADMIN — DOCUSATE SODIUM 100 MG: 100 CAPSULE, LIQUID FILLED ORAL at 20:51

## 2023-06-07 RX ADMIN — TICAGRELOR 90 MG: 90 TABLET ORAL at 08:20

## 2023-06-07 RX ADMIN — TICAGRELOR 90 MG: 90 TABLET ORAL at 20:50

## 2023-06-07 RX ADMIN — EPOETIN ALFA-EPBX 3000 UNITS: 3000 INJECTION, SOLUTION INTRAVENOUS; SUBCUTANEOUS at 17:51

## 2023-06-07 RX ADMIN — SODIUM CHLORIDE, PRESERVATIVE FREE 10 ML: 5 INJECTION INTRAVENOUS at 20:55

## 2023-06-07 RX ADMIN — BUSPIRONE HYDROCHLORIDE 7.5 MG: 5 TABLET ORAL at 08:19

## 2023-06-07 RX ADMIN — INSULIN LISPRO 15 UNITS: 100 INJECTION, SOLUTION INTRAVENOUS; SUBCUTANEOUS at 11:41

## 2023-06-07 RX ADMIN — Medication 2 ML: at 16:00

## 2023-06-07 RX ADMIN — PANTOPRAZOLE SODIUM 40 MG: 40 TABLET, DELAYED RELEASE ORAL at 06:18

## 2023-06-07 RX ADMIN — ALLOPURINOL 100 MG: 100 TABLET ORAL at 08:20

## 2023-06-07 RX ADMIN — DOCUSATE SODIUM 100 MG: 100 CAPSULE, LIQUID FILLED ORAL at 08:19

## 2023-06-07 RX ADMIN — INSULIN GLARGINE 70 UNITS: 100 INJECTION, SOLUTION SUBCUTANEOUS at 20:51

## 2023-06-07 RX ADMIN — BUSPIRONE HYDROCHLORIDE 7.5 MG: 5 TABLET ORAL at 20:50

## 2023-06-07 NOTE — DISCHARGE SUMMARY
Plan for d/c home with home care           Significant therapeutic interventions:     Significant Diagnostic Studies:   Labs / Micro:  CBC:   Lab Results   Component Value Date/Time    WBC 6.1 06/07/2023 05:13 AM    RBC 2.93 06/07/2023 05:13 AM    HGB 9.5 06/07/2023 05:13 AM    HCT 27.7 06/07/2023 05:13 AM    MCV 94.8 06/07/2023 05:13 AM    MCH 32.5 06/07/2023 05:13 AM    MCHC 34.3 06/07/2023 05:13 AM    RDW 16.9 06/07/2023 05:13 AM     06/07/2023 05:13 AM     BMP:    Lab Results   Component Value Date/Time    GLUCOSE 205 06/07/2023 05:13 AM     06/07/2023 05:13 AM    K 3.9 06/07/2023 05:13 AM     06/07/2023 05:13 AM    CO2 23 06/07/2023 05:13 AM    ANIONGAP 13 06/07/2023 05:13 AM    BUN 32 06/07/2023 05:13 AM    CREATININE 2.60 06/07/2023 05:13 AM    BUNCRER 9 05/18/2023 09:25 AM    CALCIUM 9.1 06/07/2023 05:13 AM    LABGLOM 20 06/07/2023 05:13 AM    GFRAA 27 09/06/2022 04:40 PM    GFR      09/06/2022 04:40 PM     U/A:    Lab Results   Component Value Date/Time    COLORU Yellow 06/01/2023 05:24 AM    TURBIDITY Cloudy 06/01/2023 05:24 AM    SPECGRAV 1.012 06/01/2023 05:24 AM    HGBUR NEGATIVE 06/01/2023 05:24 AM    PHUR 7.5 06/01/2023 05:24 AM    PROTEINU 2+ 06/01/2023 05:24 AM    GLUCOSEU NEGATIVE 06/01/2023 05:24 AM    KETUA NEGATIVE 06/01/2023 05:24 AM    BILIRUBINUR NEGATIVE 06/01/2023 05:24 AM    UROBILINOGEN Normal 06/01/2023 05:24 AM    NITRU NEGATIVE 06/01/2023 05:24 AM    LEUKOCYTESUR MOD 06/01/2023 05:24 AM        Radiology:  CT Head W/O Contrast    Result Date: 5/31/2023  No acute intracranial abnormality. MRI BRAIN WO CONTRAST    Result Date: 6/1/2023  1. No acute intracranial process identified. 2. Mild chronic small vessel ischemic changes.        Consultations:    Consults:     Final Specialist Recommendations/Findings:   IP CONSULT TO NEPHROLOGY  IP CONSULT TO HOSPITALIST  IP CONSULT TO NEUROLOGY  IP CONSULT TO CARDIOLOGY  IP CONSULT TO HOME CARE NEEDS      The patient was seen

## 2023-06-07 NOTE — BRIEF OP NOTE
Brief Postoperative Note    Jeffrey Farias  YOB: 1960  [id number]    Pre-operative Diagnosis: Chronic Renal Failure; malfunctioning Rt IJ permcath      Post-operative Diagnosis: Same    Procedure: Tunneled Dialysis Catheter    Medication Given: none    Anesthesia: 2%Lidocaine Local Injection     Surgeons/Assistants: Sukh Bach MD    Estimated Blood Loss: Minimal    Complications: none    11.9 Fr x 23 cm (tip to cuff) tunneled HD Catheter replaced over a wire Site:  Right Internal Jugular Vein. Dressing applied. May use HD cath for dialysis. Vital signs were reviewed and were stable after the procedure. Discharged to dialysis.     Electronically signed by Sukh Bach MD on 6/7/2023 at 5:19 PM

## 2023-06-08 ENCOUNTER — APPOINTMENT (OUTPATIENT)
Dept: INTERVENTIONAL RADIOLOGY/VASCULAR | Age: 63
End: 2023-06-08
Payer: COMMERCIAL

## 2023-06-08 VITALS
HEART RATE: 59 BPM | DIASTOLIC BLOOD PRESSURE: 41 MMHG | HEIGHT: 67 IN | TEMPERATURE: 97.7 F | BODY MASS INDEX: 37.23 KG/M2 | SYSTOLIC BLOOD PRESSURE: 128 MMHG | OXYGEN SATURATION: 94 % | WEIGHT: 237.22 LBS | RESPIRATION RATE: 18 BRPM

## 2023-06-08 LAB
ANION GAP SERPL CALCULATED.3IONS-SCNC: 15 MMOL/L (ref 9–17)
BUN SERPL-MCNC: 34 MG/DL (ref 8–23)
CALCIUM SERPL-MCNC: 9 MG/DL (ref 8.6–10.4)
CHLORIDE SERPL-SCNC: 102 MMOL/L (ref 98–107)
CO2 SERPL-SCNC: 20 MMOL/L (ref 20–31)
CREAT SERPL-MCNC: 2.58 MG/DL (ref 0.5–0.9)
ERYTHROCYTE [DISTWIDTH] IN BLOOD BY AUTOMATED COUNT: 16.9 % (ref 11.5–14.9)
GFR SERPL CREATININE-BSD FRML MDRD: 20 ML/MIN/1.73M2
GLUCOSE BLD-MCNC: 136 MG/DL (ref 65–105)
GLUCOSE BLD-MCNC: 191 MG/DL (ref 65–105)
GLUCOSE BLD-MCNC: 317 MG/DL (ref 65–105)
GLUCOSE BLD-MCNC: 357 MG/DL (ref 65–105)
GLUCOSE SERPL-MCNC: 154 MG/DL (ref 70–99)
HCT VFR BLD AUTO: 29.5 % (ref 36–46)
HGB BLD-MCNC: 9.6 G/DL (ref 12–16)
MCH RBC QN AUTO: 32.2 PG (ref 26–34)
MCHC RBC AUTO-ENTMCNC: 32.7 G/DL (ref 31–37)
MCV RBC AUTO: 98.4 FL (ref 80–100)
PLATELET # BLD AUTO: 147 K/UL (ref 150–450)
PMV BLD AUTO: 8.5 FL (ref 6–12)
POTASSIUM SERPL-SCNC: 4.4 MMOL/L (ref 3.7–5.3)
RBC # BLD AUTO: 3 M/UL (ref 4–5.2)
REASON FOR REJECTION: NORMAL
SODIUM SERPL-SCNC: 137 MMOL/L (ref 135–144)
SPECIMEN SOURCE: NORMAL
WBC OTHER # BLD: 5.2 K/UL (ref 3.5–11)
ZZ NTE CLEAN UP: ORDERED TEST: NORMAL

## 2023-06-08 PROCEDURE — 6360000002 HC RX W HCPCS: Performed by: INTERNAL MEDICINE

## 2023-06-08 PROCEDURE — 85027 COMPLETE CBC AUTOMATED: CPT

## 2023-06-08 PROCEDURE — 6370000000 HC RX 637 (ALT 250 FOR IP): Performed by: INTERNAL MEDICINE

## 2023-06-08 PROCEDURE — 90935 HEMODIALYSIS ONE EVALUATION: CPT

## 2023-06-08 PROCEDURE — 6360000002 HC RX W HCPCS: Performed by: RADIOLOGY

## 2023-06-08 PROCEDURE — 6370000000 HC RX 637 (ALT 250 FOR IP): Performed by: NURSE PRACTITIONER

## 2023-06-08 PROCEDURE — 36010 PLACE CATHETER IN VEIN: CPT

## 2023-06-08 PROCEDURE — 97129 THER IVNTJ 1ST 15 MIN: CPT

## 2023-06-08 PROCEDURE — 2500000003 HC RX 250 WO HCPCS: Performed by: INTERNAL MEDICINE

## 2023-06-08 PROCEDURE — 36581 REPLACE TUNNELED CV CATH: CPT

## 2023-06-08 PROCEDURE — 77001 FLUOROGUIDE FOR VEIN DEVICE: CPT

## 2023-06-08 PROCEDURE — C1769 GUIDE WIRE: HCPCS

## 2023-06-08 PROCEDURE — 2580000003 HC RX 258: Performed by: INTERNAL MEDICINE

## 2023-06-08 PROCEDURE — 97130 THER IVNTJ EA ADDL 15 MIN: CPT

## 2023-06-08 PROCEDURE — 99231 SBSQ HOSP IP/OBS SF/LOW 25: CPT | Performed by: INTERNAL MEDICINE

## 2023-06-08 PROCEDURE — 82947 ASSAY GLUCOSE BLOOD QUANT: CPT

## 2023-06-08 PROCEDURE — 36415 COLL VENOUS BLD VENIPUNCTURE: CPT

## 2023-06-08 PROCEDURE — 37248 TRLUML BALO ANGIOP 1ST VEIN: CPT

## 2023-06-08 PROCEDURE — 80048 BASIC METABOLIC PNL TOTAL CA: CPT

## 2023-06-08 RX ORDER — HEPARIN SODIUM 1000 [USP'U]/ML
INJECTION, SOLUTION INTRAVENOUS; SUBCUTANEOUS PRN
Status: COMPLETED | OUTPATIENT
Start: 2023-06-08 | End: 2023-06-08

## 2023-06-08 RX ADMIN — TICAGRELOR 90 MG: 90 TABLET ORAL at 20:52

## 2023-06-08 RX ADMIN — HEPARIN SODIUM 5000 UNITS: 5000 INJECTION INTRAVENOUS; SUBCUTANEOUS at 20:37

## 2023-06-08 RX ADMIN — ASPIRIN 81 MG 81 MG: 81 TABLET ORAL at 08:01

## 2023-06-08 RX ADMIN — TAMSULOSIN HYDROCHLORIDE 0.4 MG: 0.4 CAPSULE ORAL at 08:01

## 2023-06-08 RX ADMIN — BUSPIRONE HYDROCHLORIDE 7.5 MG: 5 TABLET ORAL at 14:40

## 2023-06-08 RX ADMIN — HEPARIN SODIUM 1800 UNITS: 1000 INJECTION INTRAVENOUS; SUBCUTANEOUS at 17:08

## 2023-06-08 RX ADMIN — PANTOPRAZOLE SODIUM 40 MG: 40 TABLET, DELAYED RELEASE ORAL at 06:29

## 2023-06-08 RX ADMIN — INSULIN LISPRO 15 UNITS: 100 INJECTION, SOLUTION INTRAVENOUS; SUBCUTANEOUS at 11:31

## 2023-06-08 RX ADMIN — Medication 2 ML: at 19:38

## 2023-06-08 RX ADMIN — SODIUM BICARBONATE 1300 MG: 650 TABLET ORAL at 20:36

## 2023-06-08 RX ADMIN — HEPARIN SODIUM 5000 UNITS: 5000 INJECTION INTRAVENOUS; SUBCUTANEOUS at 06:29

## 2023-06-08 RX ADMIN — SODIUM BICARBONATE 1300 MG: 650 TABLET ORAL at 08:05

## 2023-06-08 RX ADMIN — VENLAFAXINE HYDROCHLORIDE 37.5 MG: 37.5 CAPSULE, EXTENDED RELEASE ORAL at 08:10

## 2023-06-08 RX ADMIN — BUSPIRONE HYDROCHLORIDE 7.5 MG: 5 TABLET ORAL at 08:01

## 2023-06-08 RX ADMIN — GABAPENTIN 100 MG: 100 CAPSULE ORAL at 08:01

## 2023-06-08 RX ADMIN — DOCUSATE SODIUM 100 MG: 100 CAPSULE, LIQUID FILLED ORAL at 20:35

## 2023-06-08 RX ADMIN — TICAGRELOR 90 MG: 90 TABLET ORAL at 08:04

## 2023-06-08 RX ADMIN — DOCUSATE SODIUM 100 MG: 100 CAPSULE, LIQUID FILLED ORAL at 08:03

## 2023-06-08 RX ADMIN — HEPARIN SODIUM 1700 UNITS: 1000 INJECTION INTRAVENOUS; SUBCUTANEOUS at 17:09

## 2023-06-08 RX ADMIN — INSULIN GLARGINE 60 UNITS: 100 INJECTION, SOLUTION SUBCUTANEOUS at 08:06

## 2023-06-08 RX ADMIN — INSULIN GLARGINE 70 UNITS: 100 INJECTION, SOLUTION SUBCUTANEOUS at 20:37

## 2023-06-08 RX ADMIN — INSULIN LISPRO 15 UNITS: 100 INJECTION, SOLUTION INTRAVENOUS; SUBCUTANEOUS at 20:36

## 2023-06-08 RX ADMIN — BUSPIRONE HYDROCHLORIDE 7.5 MG: 5 TABLET ORAL at 20:36

## 2023-06-08 RX ADMIN — ATORVASTATIN CALCIUM 80 MG: 80 TABLET, FILM COATED ORAL at 20:36

## 2023-06-08 RX ADMIN — INSULIN LISPRO 15 UNITS: 100 INJECTION, SOLUTION INTRAVENOUS; SUBCUTANEOUS at 06:28

## 2023-06-08 RX ADMIN — ALLOPURINOL 100 MG: 100 TABLET ORAL at 08:03

## 2023-06-08 RX ADMIN — BUMETANIDE 3 MG: 1 TABLET ORAL at 08:04

## 2023-06-08 RX ADMIN — SODIUM CHLORIDE, PRESERVATIVE FREE 10 ML: 5 INJECTION INTRAVENOUS at 08:01

## 2023-06-08 RX ADMIN — Medication 2000 MG: at 16:45

## 2023-06-08 RX ADMIN — Medication 2000 MG: at 11:31

## 2023-06-08 RX ADMIN — Medication 1.9 ML: at 19:38

## 2023-06-08 ASSESSMENT — PAIN SCALES - GENERAL: PAINLEVEL_OUTOF10: 0

## 2023-06-08 NOTE — DISCHARGE INSTR - DIET

## 2023-06-08 NOTE — CARE COORDINATION
DISCHARGE PLANNING NOTE:    Message left for Carole rendon from [address] to check status of pre-cert. Electronically signed by Lara Pike RN on 6/5/2023 at 9:08 AM    [place] from [facility] states she just spoke with [facility] over the phone and they are expediting pre-cert. Spoke with patient who is alert and oriented x4. She confirmed that plan is [facility] of [address] [place] [address]. [facility]. Pt states she can transport via w/c. Active order for IV Rocephin. Will continue to follow for additional discharge needs.     Electronically signed by Lara Pike RN on 6/5/2023 at 9:55 AM
DISCHARGE PLANNING NOTE:    The patient is a M-W-F dialysis patient at 10:15 a.m., therefore she will need to receive dialysis today prior to discharge. [facility] reviewing insurance, awaiting acceptance. Edward Moreno, updated. Electronically signed by Antwon Warren RN on 6/7/2023 at 11:43 AM    ADDENDUM:  ANTHONY and DC orders faxed to [facility] and Pat Watson from [facility] updated on proposed discharge. Electronically signed by Antwon Warren RN on 6/7/2023 at 1:24 PM    ADDENDUM:    Spoke with Annabella Best RN, clinical lead regarding the patient's discharge. Dialysis was attempted on the patient and was unsuccessful due to a line problem. Patient went to IR and had a procedure performed (unsure of what) and dialysis was attempted a second time with no luck. Patient will likely have to have a catheter exchange tomorrow, 6/8/23 and dialysis prior to discharge. Voicemail left for Pat Watson with [facility] to update that the patient will likely discharge tomorrow now.      Electronically signed by Antwon Warren RN on 6/7/2023 at 4:41 PM
DISCHARGE PLANNING NOTE:    The [id number] evaluation with Dr. Jamel Wright will be tomorrow, 6/7/23, at 10:00 a.m.      Patient updated    Electronically signed by Dominic Romeo RN on 6/6/2023 at 1:56 PM
ONGOING DISCHARGE PLAN:    Patient is alert and oriented x4. Spoke with patient regarding discharge plan and patient confirms that plan is still [address] of [address] [facility]. Kelsey Canales from [facility] reached out to this writer and explained that a obpe-ks-ejyj was requested for the patient. This writer discussed this with the patient in detail, as well as the patient's sister, Jt Holbrook, on telephone, that insurance denied at this time SNF, however we can appeal with a yhns-qg-qcky process if the doctor accepts the kypd-pq-fuek. This writer inquired as to why the patient was seeking SNF admission based on the fact that the patient is mostly independent with some contact guard per therapy notes. Patient and her sister stated that the patient lives alone and that she has been suffering from altered mental status quite frequently with dialysis causing her to return to the hospital. The patient states that she would like to go to SNF so that she can work with therapy, speech therapy, as well as work on her mentation. This writer contacted Dr. Felipe Chand and explained that the facility is requesting [id number] ([phone], option #3, option #1, authorization [id number], two business days to complete). Dr. Felipe Chand reviewing. Will continue to follow for additional discharge needs. If patient is discharged prior to next notation, then this note serves as note for discharge by case management.     Electronically signed by Timi Borrego RN on 6/6/2023 at 11:43 AM
ONGOING DISCHARGE PLAN:    Patient is alert and oriented x4. Spoke with patient regarding discharge plan and patient confirms that plan is still to return home with [id number]. Patient's line was clotted yesterday and unable to complete dialysis; IR attempted to clear line and was unsuccessful. Plan is for repeat dialysis attempt today and IR to either exchange or clear catheter for use, then discharge home. Sister can transport. Will continue to follow for additional discharge needs. If patient is discharged prior to next notation, then this note serves as note for discharge by case management.     Electronically signed by Tulio Murray RN on 6/8/2023 at 11:24 AM
ONGOING DISCHARGE PLAN:    Patient is alert and oriented x4. Spoke with patient regarding discharge plan and updated that the patient's [id number] was denied. At this time [facility] list provided for [facility] and referral requested for [facility]. Post Acute Facility/Agency List     Provided patient with the following list, the list includes the overall star ratings obtained from CMS per the Medicare Web site (www.Medicare.gov):     [] [address]  [] Acute Inpatient Rehabilitation Facilities  [] Skilled Nursing Facilities  [x] Home Care    Provided verbal instructions on how to utilize the QR Code to obtain additional detailed star ratings from www. Medicare. gov     offered to print and provide the detailed list:    [x]Accepted   []Declined    The following printed detailed lists were provided:     ROB Gaspar RN, clinical lead updated. Tara Serna with [facility] updated on new referral, awaiting acceptance. If patient is discharged prior to next notation, then this note serves as note for discharge by case management.     Electronically signed by David Vasques RN on 6/7/2023 at 10:35 AM
E11.22, N18.6, Z99.2, Z79.4    Obesity, Class II, BMI 35-39.9 E66.9    Thyroid nodule greater than or equal to 1 cm in diameter incidentally noted on imaging study E04.1    Hypertension, essential I10    Anemia of chronic disease D63.8    Chronic ischemic heart disease I25.9    Ischemic stroke of frontal lobe ([facility]) I63.9    Morbid obesity with BMI of 40.0-44.9, adult ([facility]) E66.01, Z68.41    Disequilibrium syndrome E87.8    Anxiety F41.9    Chronic midline low back pain with bilateral sciatica M54.41, M54.42, G89.29    Acute thoracic back pain M54.6    COVID U07.1    Cerebral hypoperfusion I67.9    Immature arteriovenous fistula ([facility]) I77.0    History of fusion of cervical spine Z98.1    Depression with anxiety F41.8    Vancomycin resistant Enterococcus UTI A49.1, Z16.21    ESRD on hemodialysis ([facility]) N18.6, Z99.2    Dialysis disequilibrium syndrome E87.8    Acute cystitis without hematuria N30.00    VRE infection (vancomycin resistant Enterococcus) A49.1, Z16.21    Infection due to ESBL-producing Klebsiella pneumoniae A49.8, Z16.12    Class 2 severe obesity due to excess calories with serious comorbidity and body mass index (BMI) of 35.0 to 35.9 in adult ([facility]) E66.01, Z68.35    Type 2 diabetes mellitus with hyperglycemia, with long-term current use of insulin ([facility]) E11.65, Z79.4    Right hemiparesis ([facility]) G81.91    Ulcer of left foot, limited to breakdown of skin ([facility] [address] L97.521    Secondary hyperparathyroidism ([facility]) N25.81    Hypertensive urgency I16.0    Hypoxia R09.02    Congestive heart failure ([facility]) I50.9    Nephrotic range proteinuria R80.9    Acute respiratory failure with hypoxia ([facility]) J96.01    Syncope and collapse R55    Subacute cough R05.2    Acute on chronic heart failure, unspecified heart failure type ([facility]) I50.9    Diarrhea of presumed infectious origin R19.7    Epistaxis R04.0    Chronic respiratory failure with hypoxia ([facility]) J96.11    Atypical chest pain R07.89    Hemodialysis catheter dysfunction

## 2023-06-08 NOTE — DIALYSIS
HEMODIALYSIS PRE-TREATMENT NOTE    Patient Identifiers prior to treatment: name, , mrn    Isolation Required: yes                      Isolation Type: contact       (please document if patient is being managed as a PUI/COVID-19 patient)        Hepatitis status: immune                          Date Drawn                             Result  Hepatitis B Surface Antigen 3.27.23 negative        Hepatitis B Surface Antibody 3.27.23 1000        Hepatitis B Core Antibody 3.27.23 negative          How was Hepatitis Status verified: epic results     Was a copy of the labs you documented provided to facility for the patient's chart: yes    Hemodialysis orders verified: yes    Access Within normal limits ( I.e. s/s of infection,...): wnl     Pre-Assessment completed: yes    Pre-dialysis report received from: Mary AGUAYO                     Time: 0327

## 2023-06-09 ENCOUNTER — CARE COORDINATION (OUTPATIENT)
Dept: CARE COORDINATION | Age: 63
End: 2023-06-09

## 2023-06-09 ENCOUNTER — TELEPHONE (OUTPATIENT)
Dept: VASCULAR SURGERY | Age: 63
End: 2023-06-09

## 2023-06-09 DIAGNOSIS — I16.0 HYPERTENSIVE URGENCY: Primary | ICD-10-CM

## 2023-06-09 DIAGNOSIS — I50.9 ACUTE ON CHRONIC HEART FAILURE, UNSPECIFIED HEART FAILURE TYPE (HCC): ICD-10-CM

## 2023-06-09 NOTE — PROGRESS NOTES
6/9/23 4:05 PM       Remote Patient Monitoring Treatment Plan    Received request from ACM/CTANAHY Hoskins   to order remote patient monitoring for in home monitoring of CHF and HTN and order completed. Patient will be monitoring blood pressure   pulse ox . Pt has hx of ESRD and is currently on dialysis. RPM weight monitoring will not be initiated as per our protocol. Patient will engage in Remote Patient Monitoring each day to develop the skills necessary for self management. RPM Care Team Responsibilities:   Alerts will be reviewed daily and addressed within 2-4 hours during operational hours (Monday -Friday 9 am-4 pm)  Alert response and intervention documented in patient medical record  Alert response escalated to PCP per protocol and documented in patient medical record  Patient monitored over approximately  days  Discharge from program based on self-management readiness    See care coordination encounters for additional details.      Debi Lubin DNP, FNP-C, Remote Patient Monitoring NP, () [phone]

## 2023-06-09 NOTE — DIALYSIS
HEMODIALYSIS POST TREATMENT NOTE    Treatment time ordered: 2 hours    Actual treatment time: 2 hours    UltraFiltration Goal: 1 L  UltraFiltration Removed: 1 L      Pre Treatment weight: 108.6 kg  Post Treatment weight: 107.6 kg  Estimated Dry Weight:     Access used:     Central Venous Catheter:          Tunneled or Non-tunneled: tunneled           Site: right chest          Access Flow: good      Internal Access:       AV Fistula or AV Graft: fistula         Site: left arm       Access Flow: maturing       Sign and symptoms of infection: none       If YES:     Medications or blood products given: none    Chronic outpatient schedule: mwf    Chronic outpatient unit: [facility]    Summary of response to treatment: Treatment tolerated fair. No complications from CVC. At time of treatment completion patient reports dizziness and became tearful. Floor RN notified and came to unit to assess patient. Net fluid removal 1 L over 2 hours. Explain if orders NOT met, was physician notified:n/a      ACES flowsheet faxed to patient unit/ placed in patient chart: yes    Post assessment completed: yes    Report given to: Ann-Marie Blackmon RN      * Intra-treatment documented Safety Checks include the followin) Access and face visible at all times. 2) All connections and blood lines are secure with no kinks. 3) NVL alarm engaged. 4) Hemosafe device applied (if applicable). 5) No collapse of Arterial or Venous blood chambers. 6) All blood lines / pump segments in the air detectors.

## 2023-06-09 NOTE — TELEPHONE ENCOUNTER
Sidney Love NP from Nephrology called. She is at diaylsis with patient. Fistula that was done end of April is beautiful. Great bruit and thrill, and it looks great. Wanted to know if they could start using it. Talked to Dr. Abraham Mendez - he Ok'd them starting to use the fistula. We moved her appointment with Dr. Abraham Mendez to 6/30/2023 so he can hopefully remove CVC at that time (per Dr Abraham Mendez). Called Sidney Love back and gave her all the information. She is going to make sure patient is aware of the new appointment and they will start using the fistula.

## 2023-06-09 NOTE — PROGRESS NOTES
06/05/23 1321   Encounter Summary   Encounter Overview/Reason  Volunteer Encounter   Service Provided For: Patient   Referral/Consult From: Rounding   Last Encounter  06/05/23  (V)   Complexity of Encounter Low   Spiritual/Emotional needs   Type Spiritual Support   Rituals, Rites and [address]
06/06/23 1208   Encounter Summary   Encounter Overview/Reason   Encounter   Service Provided For: Patient   Referral/Consult From: Nhi   Last Encounter  06/06/23   Complexity of Encounter Low   Spiritual/Emotional needs   Type Spiritual Support   Rituals, Rites and Sacraments   Type Anointing  (Mirta Moody 6/6/23)
06/06/23 1834   Encounter Summary   Encounter Overview/Reason  Volunteer Encounter   Service Provided For: Patient   Referral/Consult From: Nhi   Last Encounter  06/06/23  (V)   Complexity of Encounter Low   Spiritual/Emotional needs   Type Spiritual Support   Rituals, Rites and Sacraments   Type [employer]ion   Assessment/Intervention/Outcome   Intervention Prayer (assurance of)/[place]
06/07/23 1500   Encounter Summary   Encounter Overview/Reason  Attempted Encounter   Service Provided For: Patient not available  (patient not in room)
06/07/23 1814   Encounter Summary   Encounter Overview/Reason  Volunteer Encounter   Service Provided For: Patient   Referral/Consult From: hNi   Last Encounter  06/07/23  (V)   Complexity of Encounter Low   Spiritual/Emotional needs   Type Spiritual Support   Rituals, Rites and [address]
06/08/23 1452   Encounter Summary   Encounter Overview/Reason  Volunteer Encounter   Service Provided For: Patient   Referral/Consult From: Nhi   Last Encounter  06/08/23  (V)   Complexity of Encounter Low   Spiritual/Emotional needs   Type Spiritual Support   Rituals, Rites and [address]
06/08/23 1853   Encounter Summary   Encounter Overview/Reason  Spiritual/Emotional Needs   Service Provided For: Patient   Referral/Consult From: [address] Family members   Last Encounter  06/08/23   Complexity of Encounter Moderate   Spiritual/Emotional needs   Type Spiritual Support   Assessment/Intervention/Outcome   Assessment Calm   Intervention Active listening;Discussed illness injury and its impact; Explored/Affirmed feelings, thoughts, concerns;Prayer (assurance of)/[place];Sustaining Presence/Ministry of presence   Outcome Engaged in conversation;Expressed feelings, needs, and concerns;Expressed Gratitude;Receptive
[facility]   INPATIENT OCCUPATIONAL THERAPY  PROGRESS NOTE  Date: 2023  Patient Name: Salomon Velasco       Room: 9398/0564-23  MRN: [id number]    : 1960  (70 y.o.)  Gender: female   Referring Practitioner: Vivian Howell MD  Diagnosis: AMS (altered mental status)    Discharge Recommendations:  Further Occupational Therapy is recommended upon facility discharge. OT Equipment Recommendations  Other: TBD      Restrictions/Precautions  Restrictions/Precautions  Restrictions/Precautions: Fall Risk;Contact Precautions  Required Braces or Orthoses?: No  Implants present? :  (pt denies)  Position Activity Restriction  Other position/activity restrictions: OT PT eval and treat     Subjective  Subjective  Subjective: Pt reports frustration with discharge plan to home, \"I don't even remember where I live, how do they expect me to function? \". Subjective  Pain: Pt reports 5/10 B foot neuropathic pain. Comments: Pt seated in chair, awake and alert upon arrival.    Objective  Orientation  Overall Orientation Status: Impaired  Orientation Level: Oriented to place;Oriented to situation (states hospital-uses context clues in room to state name, pt unable to state year or president. Pt reports situation is memory loss as reason in hospital-pt is able to utilize context clues on personal name band and whiteboard in room.)  Cognition  Overall Cognitive Status: Exceptions  Arousal/Alertness: Appropriate responses to stimuli  Following Commands: Follows one step commands with increased time; Follows one step commands with repetition  Attention Span: Appears intact  Memory: Decreased recall of precautions;Decreased recall of recent events;Decreased short term memory;Decreased long term memory  Safety Judgement: Decreased awareness of need for assistance;Decreased awareness of need for safety  Problem Solving: Assistance required to generate solutions;Assistance required to
[address]   OCCUPATIONAL THERAPY MISSED TREATMENT NOTE   INPATIENT   Date: 23  Patient Name: Shasha Velasco       Room: 7698/8719-25  MRN: [id number]   Account #: [de-identified]    : 1960  (70 y.o.)  Gender: female   Referring Practitioner: Kristin Mead MD  Diagnosis: AMS (altered mental status)             REASON FOR MISSED TREATMENT:  Patient unable to participate   -    Per case mgmt, dialysis to IR to discharge home today.              Electronically signed by DAYANA Nelson on 23 at 1:18 PM EDT
Dialysis Post Treatment Report:     -Access Assessment  WNL     -Ultrafiltration   UF Goal 1000   Prime (-) 300   NS Flush (-) 200   Other (-/+)    Total UF Removed 500     -Medications / Blood Administration  Medications Given (Y/N) y   Blood Products (Y/N) n     Narrative: Tolerated well. VSS. Denies complaints. Pt voiced she voids several times a day.
Dialysis Safety Checks:    Patient ID Verified (Y/N) yes     -Hepatitis Test                   Date      Result  Hepatitis B Surface Antigen   3/27/23 neg     Hepatitis B Surface Antibody 3/27/23 1000     Hepatitis B Core Antibody        -Treatment Initiation  Blood Vol Processed Goal (Liters)  Pump Speed x Treatment Hours x 60 Minutes    Target Fluid Removal 500     * Intra-treatment documented Safety Checks include the followin) Access and face visible at all times. 2) All connections and blood lines are secure with no kinks. 3) NVL alarm engaged. 4) Hemosafe device applied (if applicable). 5) No collapse of Arterial or Venous blood chambers. 6) All blood lines / pump segments in the air detectors. --------------------------------------------------------------------------------  Denies complaints. VSS. No issues with catheter today. Large clot evacuated pretreatment. No issues during tx.
Dr. Nahed Moss MD,    Your patient is on a medication that requires a renal dose adjustment. Renal Function Assessment:    Date Body Weight IBW  Adjusted BW SCr  CrCl Dialysis status   6/8/2023 242 lb 15.2 oz (110.2 kg)  Ideal body weight: 61.6 kg (135 lb 12.9 oz)  Adjusted ideal body weight: 81 kg (178 lb 10.6 oz) Serum creatinine: 2.58 mg/dL (H) 06/08/23 0545  Estimated creatinine clearance: 28 mL/min (A) HD       Pharmacy has renally dose-adjusted the following medication(s):    Date Original Order Renally Adjusted Order   6/8/2023 Cefazolin 1000 mg IV every 8 hours surgical prophylaxis Cefazolin 2000 mg IV every 24 hours       These changes were made per protocol according to the [facility] Renal Function-Based Dose Adjustments Policy    *Please note this dose may need readjusted if your patient's renal function significantly improves. Please contact pharmacy with any questions regarding these changes.     Brea Alcazar, [address] [facility]  6/8/2023  10:27 AM
HEMODIALYSIS POST TREATMENT NOTE    Treatment time ordered: 3Hrs & 15Mins    Actual treatment time: 10Mins    UltraFiltration Goal: 500Mls  UltraFiltration Removed: 100Mls      Pre Treatment weight: 110.2Kgs  Post Treatment weight: 110.2Kgs  Estimated Dry Weight: Unknown at this time    Access used:     Central Venous Catheter:          Tunneled or Non-tunneled: Tunneled           Site: R Chest          Access Flow: Non-functioning      Internal Access:       AV Fistula or AV Graft: AVF         Site: L arm       Access Flow: maturing       Sign and symptoms of infection: No       If YES: N/A    Medications or blood products given: N/A    Chronic outpatient schedule: MWF    Chronic outpatient unit: [facility]    Summary of response to treatment: Patients HD was unable to be performed today due to non-functioning CVC in AM which was replaced by by IR at 15:00 today then dialysis was attempted for a second time, again with new CVC now non-functional. Writer attempted to call IR with no response. Dr. Chon Echeverria notified and new CVC will be placed once more in AM for HD. Isa kilgore RN also notified of multiple attemps to use CVCs today with all interventions not successful. Explain if orders NOT met, was physician notified:Yes      ACES flowsheet faxed to patient unit/ placed in patient chart: Yes    Post assessment completed: Yes by Tano Sánchez RN    Report given to: Enrike Gore documented Safety Checks include the followin) Access and face visible at all times. 2) All connections and blood lines are secure with no kinks. 3) NVL alarm engaged. 4) Hemosafe device applied (if applicable). 5) No collapse of Arterial or Venous blood chambers. 6) All blood lines / pump segments in the air detectors.
HEMODIALYSIS PRE-TREATMENT NOTE    Patient Identifiers prior to treatment: Name//MRN    Isolation Required:  Yes                      Isolation Type: Contact       (please document if patient is being managed as a PUI/COVID-19 patient)        Hepatitis status:                           Date Drawn                             Result  Hepatitis B Surface Antigen 2023     NEG                     Hepatitis B Surface Antibody 2023 POS     1000   Hepatitis B Core Antibody 2023 NEG          How was Hepatitis Status verified: Epic chart     Was a copy of the labs you documented provided to facility for the patient's chart: Yes    Hemodialysis orders verified: Yes    Access Within normal limits ( I.e. s/s of infection,...): WNL     Pre-Assessment completed: Yes by Jesus Kay RN    Pre-dialysis report received from: Jennifer                      Time: 15:00
Nephrology Progress Note    Reason for consultation: Management of hemodialysis dependent end-stage renal disease. Consulting physician: Erma Matos MD.    Interval history: Patient was seen and examined today and she still has short-term memory deficits but is much improved. Internal jugular tunneled hemodialysis catheter was replaced yesterday but again did not function well and is scheduled to be exchanged today. Patient could not complete dialysis yesterday and would therefore be dialyzed today. She has 1 month new AV fistula follow-up with Dr. Nish Schaefer tomorrow. She does not have shortness of breath or chest pain. History of Present Illness: This is a 70 y.o. female with This is a 70 y.o. female with a significant past medical history of type 2 diabetes mellitus, systemic hypertension, CAD S/P CABG, hyperlipidemia and end-stage renal disease secondary to diabetic and hypertensive nephropathy [on routine hemodialysis Monday/Wednesday/Friday at [address] dialysis unit [facility] under the care of Dr. John Kline using IJ tunneled hemodialysis catheter] with recent placement of left brachiocephalic AVF one month ago  who presents to the hospital with unresponsiveness. Patient was at dialysis when the dialysis RN noticed patient to be unresponsive towards the end of her 3.5 hour dialysis tx. Patient had a period of hypotension but no report of seizure, tongue biting or urinary incontinence. Patient was given midodrine and appeared to go to sleep soon after however when she wake up she did not know where she was. Most of the history has been taken from her [facility],  who reports that similar episodes have been recurrent recurrent since she started dialysis in 2020. She was investigated extensively but it is unclear whether she had a stroke at that time. She was able to go off of dialysis from August 2022 to February 2023 and did not have these episodes.     She had head CT of the brain which was negative
Nephrology Progress Note    Reason for consultation: Management of hemodialysis dependent end-stage renal disease. Consulting physician: Rylie Farr MD.    Interval history:   Patient was seen and examined today during dialysis and she is awake alert oriented, still forgetful, complains of memory loss   MRI of the brain did not show any acute findings. There was however mild chronic small vessel ischemic cerebral changes noted. History of Present Illness: This is a 70 y.o. female with This is a 70 y.o. female with a significant past medical history of type 2 diabetes mellitus, systemic hypertension, CAD S/P CABG, hyperlipidemia and end-stage renal disease secondary to diabetic and hypertensive nephropathy [on routine hemodialysis Monday/Wednesday/Friday at [address] dialysis unit [facility] under the care of Dr. Omar Solo using IJ tunneled hemodialysis catheter] with recent placement of left brachiocephalic AVF one month ago  who presents to the hospital with unresponsiveness. Patient was at dialysis when the dialysis RN noticed patient to be unresponsive towards the end of her 3.5 hour dialysis tx. Patient had a period of hypotension but no report of seizure, tongue biting or urinary incontinence. Patient was given midodrine and appeared to go to sleep soon after however when she wake up she did not know where she was. Most of the history has been taken from her [facility],  who reports that similar episodes have been recurrent recurrent since she started dialysis in 2020. She was investigated extensively but it is unclear whether she had a stroke at that time. She was able to go off of dialysis from August 2022 to February 2023 and did not have these episodes. She had head CT of the brain which was negative for acute stroke.     Current Facility-Administered Medications   Medication Dose Route Frequency Provider Last Rate Last Admin    ondansetron (ZOFRAN) tablet 4 mg  4 mg Oral Q8H PRN Tom Diaz
Nutrition Education    Educated on Renal Diet  Learners: Patient  Readiness: Acceptance  Method: Explanation and Handout  Response: Verbalizes Understanding  Contact name and number provided.     Del Lal RD, LD  Contact Number: [phone]
Page out to Dr. Saran Enriquez. Salvatore Gill call back.
Patient off floor to IR
Patient talked about her memory loss and provided medical update; listening presence and support; welcomed prayer     06/06/23 1821   Encounter Summary   Encounter Overview/Reason  Spiritual/Emotional Needs   Service Provided For: Patient   Referral/Consult From: [address] [facility] [facility] [address] Family members   Last Encounter  06/06/23   Complexity of Encounter Moderate   Spiritual/Emotional needs   Type Spiritual Support   Grief, Loss, and Adjustments   Type Adjustment to illness   Assessment/Intervention/Outcome   Assessment Anxious; Coping; Hopeful;Impaired resilience; Impaired social interaction; Powerlessness   Intervention Active listening;Discussed illness injury and its impact; Explored/Affirmed feelings, thoughts, concerns;Prayer (assurance of)/[place];Sustaining Presence/Ministry of presence   Outcome Coping;Engaged in conversation;Expressed feelings, needs, and concerns;Expressed Gratitude;Receptive
Physical Therapy  [address]    Date: 23  Patient Name: Tonya Camara       Room: 9683/7363-55  MRN: [id number]   Account: [de-identified]   : 1960  (70 y.o.) Gender: female     Referring Practitioner: Brendolyn Rinne, MD  Diagnosis: AMS (altered mental status)  Past Medical History:  has a past medical history of Arthritis, Backache, unspecified, CAD (coronary artery disease), Cerebral artery occlusion with cerebral infarction ([address] CHF (congestive heart failure) ([address] Chronic kidney disease, Coronary atherosclerosis of artery bypass graft, COVID, Cramp of limb, Gallstones, Hemodialysis patient ([address] Hyperlipidemia, Hypertension, Insomnia, Neuromuscular disorder ([address] Pneumonia, Psychiatric problem, Thyroid disease, Type II or unspecified type diabetes mellitus with renal manifestations, not stated as uncontrolled(250.40), Type II or unspecified type diabetes mellitus without mention of complication, not stated as uncontrolled, and Unspecified vitamin D deficiency. Past Surgical History:   has a past surgical history that includes Coronary artery bypass graft (2005); Knee arthroscopy; Carpal tunnel release; Breast surgery; Tonsillectomy; Hand surgery; Ankle fracture surgery; Cholecystectomy, open (N/A); IR TUNNELED CVC PLACE WO SQ PORT/PUMP > 5 YEARS (2021); AV fistula creation (2021); Dialysis fistula creation (Left, 2021); other surgical history (2022); back surgery; Colonoscopy; eye surgery; fracture surgery; Cardiac surgery; IR TUNNELED CVC PLACE WO SQ PORT/PUMP > 5 YEARS (2023); IR TUNNELED CVC PLACE WO SQ PORT/PUMP > 5 YEARS (Right, 2023); Dialysis Catheter Insertion (Right, 2023); other surgical history (Left, 2023); Coronary angioplasty with stent (2023); Cardiac catheterization (); Coronary angioplasty with stent (); and Dialysis fistula creation (Left, 2023).   Additional Pertinent Hx: CHF,
Physician Progress Note      PATIENT:               Jim Araiaz  CSN #:                  [id number]  :                       1960  ADMIT DATE:       2023 1:51 PM  [address] [place] DATE:  RESPONDING  PROVIDER #:        Kings Baker MD          QUERY TEXT:    Pt admitted with AMS. Pt noted to have he AMS episodes during dialysis. If   possible, please document in the progress notes and discharge summary if you   are evaluating and / or treating any of the following: The medical record reflects the following:  Risk Factors: dialysis  Clinical Indicators: presents with AMS during dialysis, related to global   cerebral hypoperfusion with hypovolemia with fluid removal during dialysis per   nephrology note  Treatment: Labs, imaging, monitoring, Nephrology consult, Neurology consult    Thanks, Olegario Diaz RN, BSN, CRCR  Options provided:  -- Metabolic encephalopathy  -- Toxic metabolic encephalopathy  -- Other - I will add my own diagnosis  -- Disagree - Not applicable / Not valid  -- Disagree - Clinically unable to determine / Unknown  -- Refer to Clinical Documentation Reviewer    PROVIDER RESPONSE TEXT:    This patient has metabolic encephalopathy.     Query created by: Ivan Mckeon on 2023 8:53 AM      Electronically signed by:  Kings Baker MD 2023 11:44 AM
Pt discharged home via private vehicle. INT removed with no issues. Discharge instructions reviewed with patient. Pt verbalized understanding with no further questions.
Pt returned from dialysis. VSS and patient resting comfortably.
SLP ALL NOTES  [facility]  Speech Language Pathology    Date: 6/7/2023  Patient Name: Zak Gomes  YOB: 1960   AGE: 70 y.o. MRN: [id number]        Patient Not Available for Speech Therapy     Due to:  [] Testing  [x] Hemodialysis  [] Cancelled by RN  [] Surgery   [] Intubation/Sedation/Pain Medication  [] Medical instability  [] Other:        Juan Pablo Farr A.CCC/SLP
SPEECH LANGUAGE PATHOLOGY  Speech Language Pathology  [facility]    Cognitive Treatment Note    Date: 6/8/2023  Patients Name: Milton Madrigal  MRN: [id number]  Diagnosis:   Patient Active Problem List   Diagnosis Code    Atherosclerosis of coronary artery bypass graft of native heart without angina pectoris I25.810    Acute kidney injury superimposed on CKD [address] N17.9, N18.9    Acute on chronic diastolic heart failure ([facility]) I50.33    Diabetic polyneuropathy associated with type 2 diabetes mellitus ([facility]) E11.42    History of coronary artery bypass graft Z95.1    Iron deficiency anemia D50.9    Spinal stenosis of lumbar region with neurogenic claudication M48.062    Mixed hyperlipidemia E78.2    CKD (chronic kidney disease) stage 4, GFR 15-29 ml/min ([facility]) N18.4    Type 2 diabetes 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, with long-term current use of insulin ([facility]) E11.22, N18.6, Z99.2, Z79.4    Obesity, Class II, BMI 35-39.9 E66.9    Thyroid nodule greater than or equal to 1 cm in diameter incidentally noted on imaging study E04.1    Hypertension, essential I10    Anemia of chronic disease D63.8    Chronic ischemic heart disease I25.9    Ischemic stroke of frontal lobe ([facility]) I63.9    Morbid obesity with BMI of 40.0-44.9, adult ([facility]) E66.01, Z68.41    Disequilibrium syndrome E87.8    Anxiety F41.9    Chronic midline low back pain with bilateral sciatica M54.41, M54.42, G89.29    Acute thoracic back pain M54.6    COVID U07.1    Confusion R41.0    Cerebral hypoperfusion I67.9    Immature arteriovenous fistula ([facility]) I77.0    History of fusion of cervical spine Z98.1    Depression with anxiety F41.8    Vancomycin resistant Enterococcus UTI A49.1, Z16.21    ESRD on hemodialysis ([facility]) N18.6, Z99.2    Dialysis disequilibrium syndrome E87.8    Acute cystitis without hematuria N30.00    VRE infection (vancomycin resistant Enterococcus) A49.1, Z16.21    Infection due to ESBL-producing Klebsiella
SPEECH LANGUAGE PATHOLOGY  Speech Language Pathology  [facility]    Cognitive Treatment Note    Date: 6/6/2023  Patients Name: Shasha Velasco  MRN: [id number]  Diagnosis:   Patient Active Problem List   Diagnosis Code    Atherosclerosis of coronary artery bypass graft of native heart without angina pectoris I25.810    Acute kidney injury superimposed on CKD ([facility] N17.9, N18.9    Acute on chronic diastolic heart failure (HCC) I50.33    Diabetic polyneuropathy associated with type 2 diabetes mellitus ([facility]) E11.42    History of coronary artery bypass graft Z95.1    Iron deficiency anemia D50.9    Spinal stenosis of lumbar region with neurogenic claudication M48.062    Mixed hyperlipidemia E78.2    CKD (chronic kidney disease) stage 4, GFR 15-29 ml/min ([facility]) N18.4    Type 2 diabetes 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, with long-term current use of insulin ([facility]) E11.22, N18.6, Z99.2, Z79.4    Obesity, Class II, BMI 35-39.9 E66.9    Thyroid nodule greater than or equal to 1 cm in diameter incidentally noted on imaging study E04.1    Hypertension, essential I10    Anemia of chronic disease D63.8    Chronic ischemic heart disease I25.9    Ischemic stroke of frontal lobe ([facility]) I63.9    Morbid obesity with BMI of 40.0-44.9, adult ([facility]) E66.01, Z68.41    Disequilibrium syndrome E87.8    Anxiety F41.9    Chronic midline low back pain with bilateral sciatica M54.41, M54.42, G89.29    Acute thoracic back pain M54.6    COVID U07.1    Confusion R41.0    Cerebral hypoperfusion I67.9    Immature arteriovenous fistula ([facility]) I77.0    History of fusion of cervical spine Z98.1    Depression with anxiety F41.8    Vancomycin resistant Enterococcus UTI A49.1, Z16.21    ESRD on hemodialysis (HCC) N18.6, Z99.2    Dialysis disequilibrium syndrome E87.8    Acute cystitis without hematuria N30.00    VRE infection (vancomycin resistant Enterococcus) A49.1, Z16.21    Infection due to ESBL-producing Klebsiella
Writer jessica Amezcua Argue NP as pt takes oxycodone 2.5 mg and flexeril at home. Pt states she is in pain currently. Discussed AMS with NP. Will try tylenol first and notify if pts pain continues.
Writer jessica Gaffney NP for clarification on pts lantus order that was adjusted today by Dr. Jane [place]. Pts new lantus orders are not set to start until tomorrow but pts blood sugar is 278 tonight. Order modified by NP to start lantus tonight.
Writer notified Dr. Emiliano Jasso on call for Dr. Elkin Kasper of consult and updated on current pt status. Message read and MD aware.
Writer spoke with Dr. Ayse De La Torre and he is aware that pts CVC for dialysis failed today after exchange. Disccused that pt has a new fistula that was placed by Dr. Lindy Fernandez and pt is supposed to see him Friday to see if the fistula is able to be utilized now. Writer asked Dr. Ayse De La Torre if he would like Dr. Lindy Fernandez consulted to assess her here considering complications with her CVC. As pt is returning to IR tomorrow to attempt to exchange CVC again Dr. Lindy Fernandez can be consulted incase exchange is unsuccessful again.
Writer spoke with Yarelis Smith NP as pts pain has continued. Due to pts BP of 105/34 it is contraindicated to give pain medication at this time. Writer attempted to discuss with pt but pt resting comfortably in bed.
Term Goal 2: Patient will perform BADLs with Mod I, good safety, and use of AE/DME/Modified techniques as needed  Short Term Goal 3: Patient will actively participate in 15+ minutes of therapeutic exercise/functional activity to promote safety and independence with self-care  Short Term Goal 4: Patient will tolerate standing for 5+ minutes of therapeutic exercise/functional activity of choice to promote improved safety and independence with self-care  Short Term Goal 5: Patientt will sequence self-care tasks and transfers with Min cues and demonstrate improved safety awareness with transfers/functional mobility  Occupational Therapy Plan  Times Per Week: 4-6  Times Per Day:  Once a day  Current Treatment Recommendations: Self-Care / ADL, Strengthening, ROM, Balance training, Functional mobility training, Endurance training, Cognitive reorientation, Pain management, Safety education & training, Patient/Caregiver education & training, Equipment evaluation, education, & procurement, Positioning, Home management training      Assessment  Activity Tolerance  Activity Tolerance: Patient Tolerated treatment well, Treatment limited secondary to decreased cognition  Assessment  Performance deficits / Impairments: Decreased functional mobility , Decreased ADL status, Decreased strength, Decreased safe awareness, Decreased cognition, Decreased endurance, Decreased sensation, Decreased balance, Decreased high-level IADLs  Treatment Diagnosis: Impaired self-care status  Prognosis: Good  Decision Making: Medium Complexity  Discharge Recommendations: Patient would benefit from continued therapy after discharge  OT Equipment Recommendations  Other: TBD  Safety Devices  Type of Devices: Chair alarm in place, All fall risk precautions in place, Call light within reach, Gait belt, Patient at risk for falls, Left in chair, Nurse notified      AM-PAC Daily Activities Inpatient  AM-PAC Daily Activity - Inpatient   How much help is needed
hospital room?: A Little  How much help is needed climbing 3-5 steps with a railing?: A Little  AM-PAC Inpatient Mobility Raw Score : 18  AM-PAC Inpatient T-Scale Score : 43.63  Mobility Inpatient CMS 0-100% Score: 46.58  Mobility Inpatient CMS G-Code Modifier : CK      Goals  Short Term Goals  Time Frame for Short Term Goals: 3-5 days  Short Term Goal 1: Pt to demo bed mobility from flat surface IND. Short Term Goal 2: Pt to demo transfers supervision level wtih device as needed. Short Term Goal 3: pt to amb 76' with device supervision level. Short Term Goal 4: Pt to improve standing balance to GOOD. Short Term Goal 5: Pt to improve BLE strength by 1/2 MMG. Additional Goals?: Yes  Short Term Goal 6: Pt to perform 5 minutes on pedaler without change in symptoms. Short Term Goal 7: Pt to perform 5xSTS test in 13 seconds or less.        06/06/23 1124   PT Individual Minutes   Time In 0906   Time Out 0947   Minutes 41         Electronically signed by Sebastian Montez PTA on 6/6/23 at 11:24 AM EDT
information and frequently looks at her phone for pictures/videos of family members to assist c memory. ST reinforced continued use of notebook and pictures/videos on her phone to assist c recall of family members. Discussed use of family/friends memory book (including photos of different family members/friends), information re: relation to Pt and facts about them. Pt verbalized understanding, \"I like that idea\". Working memory:  Mental manipulation (3 units, word order)- 60% accuracy (I), 100% c cues. Pt becoming tearful when encountering difficulty with task. Emotional support provided by writer. Other: Pt occasionally tearful during session -- related to memory deficits, her mother being in the hospital and missing family members. Frequent emotional support and encouragement provided by writer. Plan:  [x] Continue ST services    [] Discharge from ST:      Discharge recommendations: []  Further therapy recommended at discharge. The patient should be able to tolerate at least 3 hours of therapy per day over 5 days or 15 hours over 7 days. [] Further therapy recommended at discharge. [] No therapy recommended at discharge. Treatment completed by:  Milagros Peace M.A., CCC-SLP
TempSrc:       SpO2:  94% 99%    Weight:       Height:         24HR INTAKE/OUTPUT:    Intake/Output Summary (Last 24 hours) at 6/6/2023 1205  Last data filed at 6/5/2023 2046  Gross per 24 hour   Intake --   Output 900 ml   Net -900 ml       Patient Vitals for the past 96 hrs (Last 3 readings):   Weight   06/05/23 1508 264 lb 8.8 oz (120 kg)   06/02/23 1430 226 lb (102.5 kg)   06/02/23 1417 224 lb 13.9 oz (102 kg)     Constitutional:  Alert, awake, no apparent distress  Cardiovascular:  S1, S2 without m/r/g  Respiratory: Clinically clear on auscultation. Abdomen: Soft with normal bowel sounds. Extremities: Trace pedal edema    Data/  Recent Labs     06/05/23  0546   WBC 7.1   HGB 9.9*   HCT 29.9*   MCV 97.1        Recent Labs     06/05/23  0546      K 3.9      CO2 23   GLUCOSE 262*   PHOS 4.9*   BUN 37*   CREATININE 3.02*   LABGLOM 17*     Assessment/Plan:     1. End-stage renal disease - will maintain F hemodialysis schedule. Dialysis catheter is functioning well. She is s/p placement of left brachiocephalic AVF one month ago. We will keep outpatient dry weight 100 kg   Renal diet,i.e 2-gram sodium,2-gram potassium,1500 ml fluid restriction,1-gram phosphorus, 1800 KCal and 1.2 gram/kg protein per day. 2.  Systemic hypertension - blood pressure control is adequate. Patient is clinically euvolemic on exam.     3.  Worsening short-term memory and confusion - rule out vascular dementia     4. Mineral and bone disease profile - Serum phosphorus is within target range. 5.  Anemia of end-stage renal disease - hemoglobin 10.4 g/dL is within target range. 6.  Leukocyturia - we will check urine culture and sensitivity. Plan  Continue hemodialysis Monday Wednesday Friday   hemodialysis tomorrow    Prognosis is guarded.     Erum Santos MD  Attending nephrologist
adequate. Patient is clinically euvolemic on exam.     3.  Worsening short-term memory and confusion - rule out vascular dementia     4. Mineral and bone disease profile - Serum phosphorus is within target range. 5.  Anemia of end-stage renal disease - hemoglobin 10.4 g/dL is within target range. 6.  Leukocyturia - we will check urine culture and sensitivity. Plan  Continue hemodialysis Monday Wednesday Friday   hemodialysis today    Discharge planning in progress    Prognosis is guarded.     Julieta Humphreys MD  Attending nephrologist
reports that she has never smoked. She has never used smokeless tobacco.  Alcohol:      reports no history of alcohol use. Drug Use:  reports no history of drug use. Family History:     Family History   Problem Relation Age of Onset    Diabetes Father     Heart Failure Father            Physical Exam:   BP (!) 106/52   Pulse 61   Temp 97.7 °F (36.5 °C)   Resp 17   Ht 5' 7\" (1.702 m)   Wt 264 lb 8.8 oz (120 kg)   SpO2 100%   BMI 41.43 kg/m²   Recent Labs     06/06/23  1556 06/06/23 2011 06/07/23  0619 06/07/23  1104   POCGLU 355* 278* 166* 316*       General Appearance:  alert, well appearing, and in no acute distress  Mental status: oriented to person, place, and time with normal affect  Head:  normocephalic, atraumatic. Eye: no icterus, redness, pupils equal and reactive, extraocular eye movements intact, conjunctiva clear  Ear: normal external ear, no discharge, hearing intact  Nose:  no drainage noted  Mouth: mucous membranes moist  Neck: supple, no carotid bruits, thyroid not palpable  Lungs: Bilateral equal air entry, clear to ausculation, no wheezing, rales or rhonchi, normal effort  Cardiovascular: normal rate, regular rhythm, no murmur, gallop, rub. Abdomen: Soft, nontender, nondistended, normal bowel sounds, no hepatomegaly or splenomegaly  Neurologic: Patient is unable to answer orientation questions only able to tell me her name appears alert does not appear confused but complaining of memory loss.   There are no new focal motor or sensory deficits, normal muscle tone and bulk, no abnormal sensation, normal speech, cranial nerves II through XII grossly intact  Skin: No gross lesions, rashes, bruising or bleeding on exposed skin area  Extremities:  peripheral pulses palpable, no pedal edema or calf pain with palpation  Psych: normal affect     Investigations:      Laboratory Testing:  Recent Results (from the past 24 hour(s))   POC Glucose Fingerstick    Collection Time: 06/06/23  3:56 PM
moderate leuk esterase and few bacteria on UA-we will start on Rocephin   Culture still negative,  We will discontinue the antibiotics at the time of discharge,  We will discharge if okay with nephrology  PT/OT   Suggested SNF       6/4  Patient seen and examined at bedside  Complaining of dry eyes, nausea and chest pressure. also mentions amnesia. Will check EKG and troponin. Blood pressure 144/65, afebrile, pulse 72, on room air. Labs reviewed glycemia suboptimal but trending down. Increase Lantus and change to high-dose sliding scale  Pending placement      6/5/23    Troponins were elevated yesterday . C/o chest pain . Ekg ok . Cardiology csaw patient   Troponin elevation related to esrd   Await placement. No new symptoms  Vitals stable . Cardiopulmonary stable . Continue current rx ,                 6/6/23    Blood sugars are labile  So I changed Lantus insulin to 70 units at night and 60 units in the morning  She used to take 15 units of Humalog before meals  Right now she is on coverage high-dose  Will monitor  Precert efforts at being made               Yamil Gamble MD  Internal Medicine Resident, [facility]- [facility];  [place], [place]  6/6/2023, 12:13 PM
radiology, medications reviewed,  Appreciate neurology consult, they signed off, MRI negative,  Appreciate nephrology,  Amnesia possibly due to fluid shift, nephrology working on diet,  Electrolytes reviewed,   moderate leuk esterase and few bacteria on UA-we will start on Rocephin   Culture still negative,  We will discontinue the antibiotics at the time of discharge,  We will discharge if okay with nephrology  PT/OT   Suggested SNF       6/4  Patient seen and examined at bedside  Complaining of dry eyes, nausea and chest pressure. also mentions amnesia. Will check EKG and troponin. Blood pressure 144/65, afebrile, pulse 72, on room air. Labs reviewed glycemia suboptimal but trending down. Increase Lantus and change to high-dose sliding scale  Pending placement      6/5/23    Troponins were elevated yesterday . C/o chest pain . Ekg ok . Cardiology csaw patient   Troponin elevation related to esrd   Await placement. No new symptoms  Vitals stable . Cardiopulmonary stable . Continue current rx ,                 Landen Arias MD  Internal Medicine Resident, PGY- [address], [place]  6/5/2023, 11:28 AM
Patient seen and examined at bedside  /52, pulse 61, on room air  Cr 2.6, glycemia 316, Hb 9.6. On lantus 60 morning and 70 nightly. Received 55 humalog in last 24 hrs. SNF and [id number] declined. Plan for d/c home with home care    6/8  Patient seen and examined at bedside  No complains today. Had dialysis catheter replaced by IR, but still malfunctioned, reason she could not be discharged  /44, pulse 63, on room air  BUN 34, K+ 4.4, glycemia 136  Will discharge after regularization of dialysis catheter and completion of HD today        John Das MD  Internal Medicine Resident, PGY- [place]; [place], [place]  6/8/2023, 9:59 AM  ,    Attestation and add on       I have discussed the care of Luis Eduardo Salvador , including pertinent history and exam findings,      6/8/23    with the resident. I have seen and examined the patient and the key elements of all parts of the encounter have been performed by me . I agree with the assessment, plan and orders as documented by the resident. Home after dialysis , if catheter works      MD MOE Keller [address], [address] [place] [address].    Phone [phone]   Fax: [phone]  Answering Service: [phone]

## 2023-06-09 NOTE — CARE COORDINATION
Remote Patient Kit Ordering Note      Date/Time:  6/9/2023 12:32 PM      [] CCSS confirmed patient shipping address  [] Patient will receive package over the next 1-3 business days. Someone 21 years or older must be present to sign for UPS delivery. [] HRS will contact patient within 24 hours, an [address] will call the patient directly: If the patient does not answer, HRS will follow up with the clinical team notifying them about the unsuccessful attempt to contact the patient. HRS will make three call attempts to the patient. Provide patient with [facility] Virtual install number is: [phone]. [] ACM will contact patient once equipment is active to welcome them to the program.                                                         [] Hours of RPM monitoring - Monday-Friday 4187-4256; encourage patient to get vitals entered by [facility] each day to have the alert addressed same day. [][facility] to patient. All questions answered at this time. ACM made aware the RPM kit has been ordered. [facility] notified patient of RPM equipment order. Writer was unable to reach pt and voicemail message left regarding kit order and estimated UPS delivery time.

## 2023-06-09 NOTE — PLAN OF CARE
Problem: Discharge Planning  Goal: Discharge to home or other facility with appropriate resources  Outcome: Adequate for Discharge     Problem: Safety - Adult  Goal: Free from fall injury  Outcome: Adequate for Discharge     Problem: ABCDS Injury Assessment  Goal: Absence of physical injury  Outcome: Adequate for Discharge     Problem: Metabolic/Fluid and Electrolytes - Adult  Goal: Electrolytes maintained within normal limits  Outcome: Adequate for Discharge  Goal: Hemodynamic stability and optimal renal function maintained  Outcome: Adequate for Discharge  Goal: Glucose maintained within prescribed range  Outcome: Adequate for Discharge     Problem: Chronic Conditions and Co-morbidities  Goal: Patient's chronic conditions and co-morbidity symptoms are monitored and maintained or improved  Outcome: Adequate for Discharge     Problem: Neurosensory - Adult  Goal: Achieves maximal functionality and self care  Outcome: Adequate for Discharge     Problem: Skin/Tissue Integrity - Adult  Goal: Skin integrity remains intact  Outcome: Adequate for Discharge     Problem: Infection - Adult  Goal: Absence of infection at discharge  Outcome: Adequate for Discharge

## 2023-06-19 ENCOUNTER — CARE COORDINATION (OUTPATIENT)
Dept: CASE MANAGEMENT | Age: 63
End: 2023-06-19

## 2023-06-19 NOTE — CARE COORDINATION
[facility] Care Transitions Follow Up Call    Patient Current Location:  Home: [facility]    Care Transition Nurse contacted the patient by telephone to follow up after admission on 23. Verified name and  with patient as identifiers. Patient: Russ Gillis  Patient : 1960   MRN: [id number]  Reason for Admission:   Discharge Date: 23 RARS: Readmission Risk Score: 39.1      Needs to be reviewed by the provider   Additional needs identified to be addressed with provider: No  none             Method of communication with provider: none. Writer spoke to patient, she is doing ok, denies any c/o fever, chills, n/v/d sob or chest pain at this time, appetite has been good, her sister is coming over to help her with the RPM equipment, writer provided number for tech support for RPM, patient had f/u with pcp , she has bronchitis, no antibiotic prescribed, still has a cough, reviewed s/s/tx of CHF again with patient, she v/u, will continue to follow//JU    Addressed changes since last contact:      Discussed follow-up appointments. If no appointment was previously scheduled, appointment scheduling offered: Yes. Is follow up appointment scheduled within 7 days of discharge? Yes. Follow Up  Future Appointments   Date Time Provider Balbir Suzanne   2023  3:30 PM [facility]   8/3/2023 12:20 PM Yas Waters MD Neuro Spec Neurology -   2023  1:15 PM [address] patient enrollment in the Remote Patient Monitoring (RPM) program for in-home monitoring: Yes, patient already enrolled.      Care Transitions Subsequent and Final Call    Subsequent and Final Calls  Are you currently active with any services?: Home Health, Outpatient/Community Services  Care Transitions Interventions     Other Therapy Services: Completed     Physical Therapy: Completed Other Services: Completed     Registered

## 2023-06-21 ENCOUNTER — CARE COORDINATION (OUTPATIENT)
Dept: CASE MANAGEMENT | Age: 63
End: 2023-06-21

## 2023-06-21 NOTE — CARE COORDINATION
[facility] Care Transitions Follow Up Call    Patient Current Location:  Home: [facility]    Care Transition Nurse contacted the patient by telephone to follow up after admission on 23. Verified name and  with patient as identifiers. Patient: Medardo Fuentes  Patient : 1960   MRN: [id number]  Reason for Admission:   Discharge Date: 23 RARS: Readmission Risk Score: 39.1      Needs to be reviewed by the provider   Additional needs identified to be addressed with provider: No  none             Method of communication with provider: none. Writer spoke to patient, she is doing ok, still has the cough, taking the mucinex her pcp started wit a little relief, denies any c/o fever, chills, n/v/d, sob or chills, ROM equipment is up and running, welcome call completed    Remote Patient Monitoring Welcome Note  Date/Time:  2023 4:12 PM  Patient Current Location: Home: [address] [place] [address]  Verified patients name and  as identifiers.   Completed and confirmed the following:   Emergency Contact: Hailey Guido (Brother/Sister)   [phone] (Home Phone)  [x] Patient received all RPM equipment (tablet, scale, blood pressure device and cuff, and pulse oximeter)  Cuff Size: regular (9.05\"-15.74\")    Weight Scale:  regular (<330lbs)                    [x] Instructed patient keep box for use when returning equipment                                                          [x] Reviewed Patient Welcome Letter with patient                         [x] Reviewed expectations for patient and care team  Monitoring hours M-F 9-4pm  Completing monitoring by 12pm on  so that alerts can be responded to in the same day  Patient weighs self at same time every day (or after urinating and waking up)  Take blood pressure 1-2 hrs after medications   RPM team may have different phone area code (including [facility], [place], [facility] or [place])                              [x] Instructed patient to keep

## 2023-06-26 ENCOUNTER — CARE COORDINATION (OUTPATIENT)
Dept: CARE COORDINATION | Age: 63
End: 2023-06-26

## 2023-06-26 ENCOUNTER — TELEPHONE (OUTPATIENT)
Dept: VASCULAR SURGERY | Age: 63
End: 2023-06-26

## 2023-06-27 ENCOUNTER — CARE COORDINATION (OUTPATIENT)
Dept: CARE COORDINATION | Age: 63
End: 2023-06-27

## 2023-06-28 ENCOUNTER — CARE COORDINATION (OUTPATIENT)
Dept: CASE MANAGEMENT | Age: 63
End: 2023-06-28

## 2023-07-06 ENCOUNTER — CARE COORDINATION (OUTPATIENT)
Dept: CARE COORDINATION | Age: 63
End: 2023-07-06

## 2023-07-06 ENCOUNTER — CARE COORDINATION (OUTPATIENT)
Dept: CASE MANAGEMENT | Age: 63
End: 2023-07-06

## 2023-07-06 NOTE — CARE COORDINATION
Remote Alert Monitoring Note  Rpm alert to be reviewed by the provider   red alert   pulse ox reading (91%)   Additional needs to be addressed by N/A: No      Date/Time:  2023 10:56 AM  Patient Current Location: [facility] contacted patient by telephone regarding red alert received for pulse ox reading (91%). Verified patients name and  as identifiers. Background: Pt enrolled in RPM r/t HTN, CHF. Refer to 911 immediately if:  Patient unresponsive or unable to provide history  Change in cognition or sudden confusion  Patient unable to respond in complete sentences  Intense chest pain/tightness  Any concern for any clinical emergency  Red Alert: Provider response time of 1 hr required for any red alert requiring intervention  Yellow Alert: Provider response time of 3hr required for any escalated yellow alert  O2 Triage  Are you having any Chest Pain? no   Are you having any Shortness of Breath? no   Swelling in your hands or feet? no   Are you having any other health concerns or issues? no   Clinical Interventions: Reviewed and followed up on alerts and treatments-Pt speaking in full sentences, no apparent distress. Denies any SOB/dyspnea at this time. She states hands cold. Instructed pt to rub hands together to warm and requested she recheck oxygen level with updated reading of 97%. Reminded pt that if oxygen level below 92% she should take a couple of deep breaths, reposition oximeter and recheck oxygen level. Verbalizes understanding. Plan/Follow Up: Will continue to review, monitor and address alerts with follow up based on severity of symptoms and risk factors.

## 2023-07-06 NOTE — CARE COORDINATION
medical condition-CKD,CHF,DM2      Offered patient enrollment in the Remote Patient Monitoring (RPM) program for in-home monitoring: Yes, patient already enrolled. Care Transitions Subsequent and Final Call    Subsequent and Final Calls  Do you have any ongoing symptoms?: No  Have your medications changed?: No  Do you have any questions related to your medications?: No  Do you currently have any active services?: Yes  Are you currently active with any services?: Home Health, Outpatient/Community Services  Do you have any needs or concerns that I can assist you with?: No  Care Transitions Interventions     Other Therapy Services: Completed     Physical Therapy: Completed Other Services: Completed     Registered Dietician: Completed DME Assistance: Completed    Occupational Therapy: Completed     Disease Association: Completed  Specialty Service Referral: Completed    Other Interventions:             LPN Care Coordinator provided contact information for future needs. Plan for follow-up call in 1-2 days based on severity of symptoms and risk factors. Plan for next call: referral to ambulatory care [address] [place] [address] Transition Summary:    Patient: Torin Li Patient : 1960        Is patient active with support services?  yes -  rpm  Home Self-managing equipment:   Continued Care Coordination Recommended:  yes - rpm      Problem List:   Patient Active Problem List   Diagnosis    Atherosclerosis of coronary artery bypass graft of native heart without angina pectoris    Acute kidney injury superimposed on CKD ([address])    Acute on chronic diastolic heart failure ([address])    Diabetic polyneuropathy associated with type 2 diabetes mellitus ([address])    History of coronary artery bypass graft    Iron deficiency anemia    Spinal stenosis of lumbar region with neurogenic claudication    Mixed hyperlipidemia    CKD (chronic kidney disease) stage 4, GFR 15-29 ml/min (formerly [facility])    Type 2 diabetes mellitus with

## 2023-07-07 ENCOUNTER — CARE COORDINATION (OUTPATIENT)
Dept: CASE MANAGEMENT | Age: 63
End: 2023-07-07

## 2023-07-07 ENCOUNTER — OFFICE VISIT (OUTPATIENT)
Dept: VASCULAR SURGERY | Age: 63
End: 2023-07-07

## 2023-07-07 VITALS
OXYGEN SATURATION: 98 % | HEIGHT: 67 IN | SYSTOLIC BLOOD PRESSURE: 118 MMHG | HEART RATE: 81 BPM | BODY MASS INDEX: 35.79 KG/M2 | RESPIRATION RATE: 20 BRPM | WEIGHT: 228 LBS | DIASTOLIC BLOOD PRESSURE: 67 MMHG | TEMPERATURE: 97.8 F

## 2023-07-07 DIAGNOSIS — N18.6 END STAGE RENAL DISEASE (HCC): Primary | ICD-10-CM

## 2023-07-07 NOTE — PROGRESS NOTES
Division of Vascular Surgery           The patient returns for tunnel dialysis access catheter removal.  The catheter was no longer needed as her fistula is working well after superficialization. They are very pleased with his progress. She has a palpable thrill in the fistula and the skin has healed over the fistula quite well. Procedure Note    Preoperative/Postoperative Diagnosis: ESRD on hemodialysis    Procedure: Removal of tunneled central venous dialysis catheter    Surgeon: Dr. Lizabeth Collet    EBL: 8 ml    Complications: none    Procedure:  Patient's dressing around her tunneled CV dialysis catheter was removed, the sutures were cut and the area cleaned. Local anesthetic was delivered around the cuff. Gentle traction and blunt dissection performed. The cuff was identified and the scar tissue was excised with a scalpel around this, releasing the catheter. The tunneled catheter was then removed completely intact. Manual pressure was held for hemostasis and dressing applied. Patient tolerated procedure well, ok to shower.     Electronically signed by Efrain Blanton MD on 7/7/2023 at 5:00 PM

## 2023-07-07 NOTE — CARE COORDINATION
Writer called pt to check in she was ended on 7/6 just wanted to see if she [facility] . Pt also had a high BS yesterday . Writer wanted to see what her BS was today . Writer left  requesting a call back. Ivonne PARIS [facility] ,[address] [place] [address]    Care Transitions Nurse  Cell

## 2023-07-10 ENCOUNTER — CARE COORDINATION (OUTPATIENT)
Dept: CASE MANAGEMENT | Age: 63
End: 2023-07-10

## 2023-07-10 NOTE — CARE COORDINATION
Patient has graduated from the Care Transitions program on 7/10/23. Patient/family has the ability to self-manage at this time. Patient accepted referral to the [facility] team for further management. Warm hand off to [address] Johnson spoke to patient, she is doing well, was at dialysis waiting for transportation to pick her up, informed her of final call and that Lizbeth TERAN would be following from here on, patient v/u, Chayo Brandon spoke to Admeld with update, care transition episode resolved  Patient has Care Transition Nurse's contact information for any further questions, concerns, or needs.   Patients upcoming visits:    Future Appointments   Date Time Provider [address]   7/27/2023 10:00 AM DO MARCIA Powell [facility] [place] MARCIA AGUILAR C   8/3/2023 12:20 PM Michelle Pastor MD Neuro Spec Neurology -   8/22/2023  1:15 PM Maya Garcia, [address] [place] [address]

## 2023-07-13 ENCOUNTER — CARE COORDINATION (OUTPATIENT)
Dept: CARE COORDINATION | Age: 63
End: 2023-07-13

## 2023-07-13 NOTE — CARE COORDINATION
Ambulatory Care Coordination Note  2023  CTN HAND OFF  Patient Current Location:  Home: [address] [place] [id number]    ACM contacted the patient by telephone. Verified name and  with patient as identifiers. Provided introduction to self, and explanation of the ACM role. ACM: Antonio Crowder RN    Challenges to be reviewed by the provider   Additional needs identified to be addressed with provider: No  none               Method of communication with provider: none. Spoke with patient, explained care coordination. Patient is accepting of program.  Patient reports RPM is going well. She has home health through [facility], nursing, PT and OT. Stated her insurance nurse is coming out for a visit today. DM, no concerns today  CHF, stable at this time per patient. Patient has ACM contact information and encouraged to call with any needs. Offered patient enrollment in the Remote Patient Monitoring (RPM) program for in-home monitoring: Yes, patient already enrolled. Ambulatory Care Coordination Assessment    Care Coordination Protocol  Referral from Primary Care Provider: No  Week 1 - Initial Assessment     Do you have all of your prescriptions and are they filled?: Yes  Barriers to medication adherence: None  Are you able to afford your medications?: Yes  How often do you have trouble taking your medications the way you have been told to take them?: Sometimes I take them as prescribed. Do you have Home O2 Therapy?: Yes      Ability to seek help/take action for Emergent Urgent situations i.e. fire, crime, inclement weather or health crisis. : Independent  Ability to ambulate to restroom: Independent  Ability handle personal hygeine needs (bathing/dressing/grooming): Independent  Ability to manage Medications: Independent  Ability to prepare Food Preparation: Independent  Ability to maintain home (clean home, laundry):  Independent  Ability to drive and/or has transportation: Independent  Ability

## 2023-07-17 ENCOUNTER — CARE COORDINATION (OUTPATIENT)
Dept: CARE COORDINATION | Age: 63
End: 2023-07-17

## 2023-07-17 NOTE — CARE COORDINATION
Remote Alert Monitoring Note  Rpm alert to be reviewed by the provider   red alert   blood pressure reading (184/80)   Additional needs to be addressed by N/A: No      Date/Time:  2023 9:14 AM  Patient Current Location: [address] [place] [address] contacted patient by telephone regarding red alert received for blood pressure reading (184/80). Verified patients name and  as identifiers. Background: Pt enrolled in RPM r/t HTN, CHF. Refer to 911 immediately if:  Patient unresponsive or unable to provide history  Change in cognition or sudden confusion  Patient unable to respond in complete sentences  Intense chest pain/tightness  Any concern for any clinical emergency  Red Alert: Provider response time of 1 hr required for any red alert requiring intervention  Yellow Alert: Provider response time of 3hr required for any escalated yellow alert  BP Triage  Are you having any Chest Pain? no   Are you having any Shortness of Breath? no   Do you have a headache or have any vision changes? no   Are you having any numbness or tingling? no   Are you having any other health concerns or issues? no   Clinical Interventions: Reviewed and followed up on alerts and treatments-Spoke with pt who is in no apparent distress. States she checked BP this morning prior to taking HTN medications. She is currently walking in to dialysis appointment and states she will recheck BP later today when she is home. Plan/Follow Up: Will continue to review, monitor and address alerts with follow up based on severity of symptoms and risk factors.

## 2023-07-17 NOTE — CARE COORDINATION
7/17/23 4:00 PM Patient is currently enrolled in Remote Patient Monitoring for CHF and HTN. Pt has not updated BP as of this time. Plan/Follow Up: Will continue to review, monitor and address alerts with follow up based on severity of symptoms and risk factors.

## 2023-07-21 ENCOUNTER — CARE COORDINATION (OUTPATIENT)
Dept: CARE COORDINATION | Age: 63
End: 2023-07-21

## 2023-07-21 NOTE — CARE COORDINATION
Ambulatory Care Coordination Note  2023    Patient Current Location: [place]     ACM contacted the patient by telephone. Verified name and  with patient as identifiers. Provided introduction to self, and explanation of the ACM role. Challenges to be reviewed by the provider   Additional needs identified to be addressed with provider: Yes  Question about labs from dialysis. Method of communication with provider: chart routing. Date Care Coordination Episode Started:  23      Reason patient is in Care Coordination:     CTN referral    Topics Discussed Today:     Medications, refill request sent to PCP. Labs, patient asked about her recent labs from dialysis and if PCP wants to address them, ACM explained that we can not see them, she will have them faxed to PCP. DM- patient reports her blood sugar has still been running high. Her A1C is 8.3 and she stated she is working with a dietician at dialysis. CHF- no selling but some SOB with ambulation, not worse than baseline per patient. RPM- going well, lats reading was:    RPM green alert reviewed. Blood Pressure: 138/71, 97bpm Pulseox: 97%, 99bpm Survey: - Note Created at: 2023 09:17 AM   Assessments completed today:     Fall risk  Initial assessment  Medication reconciliation  SDOH  DM, CHF (Health assessments)      Care Coordinator plan of care:     Pend refills to PCP, follow up in 1 week, continue education and support. Offered patient enrollment in the Remote Patient Monitoring (RPM) program for in-home monitoring: Yes, patient already enrolled. Care Coordination Interventions    Referral from Primary Care Provider: No  Suggested Interventions and [address]: Completed (Comment: med [facility])  Transportation Support: Declined  Zone Management Tools:  In Process          Goals Addressed                   This Visit's Progress     Self Monitoring   On track     Self-Monitored Blood

## 2023-07-21 NOTE — TELEPHONE ENCOUNTER
These labs are completed and addressed by nephrology with each dialysis visit. This is not for me to interfere with. If low, she should increase her potassium rich foods slightly in the diet. But too much potassium is also not a good thing in someone with renal failure, and therefore they do address this and adjust the dialysis bath accordingly.

## 2023-07-27 ENCOUNTER — CARE COORDINATION (OUTPATIENT)
Dept: CARE COORDINATION | Age: 63
End: 2023-07-27

## 2023-07-27 NOTE — CARE COORDINATION
7/27/23 9:16 AM Patient is currently enrolled in Remote Patient Monitoring for CHF and HTN. Pt has scheduled cardiology visit today at 10 am.  RPM metrics added for review.

## 2023-07-28 ENCOUNTER — CARE COORDINATION (OUTPATIENT)
Dept: CARE COORDINATION | Age: 63
End: 2023-07-28

## 2023-07-28 NOTE — CARE COORDINATION
Spoke with patient briefly who denied any needs, stated everything is good today. Will follow up in 1 week.

## 2023-07-31 ENCOUNTER — APPOINTMENT (OUTPATIENT)
Dept: GENERAL RADIOLOGY | Age: 63
DRG: 302 | End: 2023-07-31
Payer: COMMERCIAL

## 2023-07-31 ENCOUNTER — HOSPITAL ENCOUNTER (INPATIENT)
Age: 63
LOS: 8 days | Discharge: HOME HEALTH | DRG: 302 | End: 2023-08-08
Attending: EMERGENCY MEDICINE | Admitting: STUDENT IN AN ORGANIZED HEALTH CARE EDUCATION/TRAINING PROGRAM
Payer: COMMERCIAL

## 2023-07-31 ENCOUNTER — APPOINTMENT (OUTPATIENT)
Dept: GENERAL RADIOLOGY | Age: 63
DRG: 302 | End: 2023-07-31
Attending: EMERGENCY MEDICINE
Payer: COMMERCIAL

## 2023-07-31 ENCOUNTER — TELEPHONE (OUTPATIENT)
Dept: VASCULAR SURGERY | Age: 63
End: 2023-07-31

## 2023-07-31 DIAGNOSIS — R94.31 ACUTE ELECTROCARDIOGRAM CHANGES: ICD-10-CM

## 2023-07-31 DIAGNOSIS — E11.65 TYPE 2 DIABETES MELLITUS WITH HYPERGLYCEMIA, WITH LONG-TERM CURRENT USE OF INSULIN (HCC): ICD-10-CM

## 2023-07-31 DIAGNOSIS — R07.9 CHEST PAIN, UNSPECIFIED TYPE: Primary | ICD-10-CM

## 2023-07-31 DIAGNOSIS — I20.0 UNSTABLE ANGINA (HCC): ICD-10-CM

## 2023-07-31 DIAGNOSIS — Z79.4 TYPE 2 DIABETES MELLITUS WITH HYPERGLYCEMIA, WITH LONG-TERM CURRENT USE OF INSULIN (HCC): ICD-10-CM

## 2023-07-31 DIAGNOSIS — E87.6 HYPOKALEMIA: ICD-10-CM

## 2023-07-31 DIAGNOSIS — E66.9 OBESITY, CLASS II, BMI 35-39.9: ICD-10-CM

## 2023-07-31 DIAGNOSIS — I50.33 ACUTE ON CHRONIC DIASTOLIC HEART FAILURE (HCC): ICD-10-CM

## 2023-07-31 DIAGNOSIS — F41.9 ANXIETY: ICD-10-CM

## 2023-07-31 LAB
ALBUMIN SERPL-MCNC: 3.5 G/DL (ref 3.5–5.2)
ALP SERPL-CCNC: 137 U/L (ref 35–104)
ALT SERPL-CCNC: 19 U/L (ref 5–33)
ANION GAP SERPL CALCULATED.3IONS-SCNC: 17 MMOL/L (ref 9–17)
ANTI-XA UNFRAC HEPARIN: <0.1 IU/L (ref 0.3–0.7)
AST SERPL-CCNC: 26 U/L
BASOPHILS # BLD: 0.1 K/UL (ref 0–0.2)
BASOPHILS NFR BLD: 1 % (ref 0–2)
BILIRUB SERPL-MCNC: 0.8 MG/DL (ref 0.3–1.2)
BNP SERPL-MCNC: 1221 PG/ML
BUN SERPL-MCNC: 16 MG/DL (ref 8–23)
CALCIUM SERPL-MCNC: 9 MG/DL (ref 8.6–10.4)
CHLORIDE SERPL-SCNC: 97 MMOL/L (ref 98–107)
CO2 SERPL-SCNC: 25 MMOL/L (ref 20–31)
CREAT SERPL-MCNC: 2.1 MG/DL (ref 0.5–0.9)
EOSINOPHIL # BLD: 0.2 K/UL (ref 0–0.4)
EOSINOPHILS RELATIVE PERCENT: 3 % (ref 0–4)
ERYTHROCYTE [DISTWIDTH] IN BLOOD BY AUTOMATED COUNT: 16.4 % (ref 11.5–14.9)
GFR SERPL CREATININE-BSD FRML MDRD: 26 ML/MIN/1.73M2
GLUCOSE SERPL-MCNC: 364 MG/DL (ref 70–99)
HCT VFR BLD AUTO: 32.7 % (ref 36–46)
HGB BLD-MCNC: 10.6 G/DL (ref 12–16)
LYMPHOCYTES NFR BLD: 1.5 K/UL (ref 1–4.8)
LYMPHOCYTES RELATIVE PERCENT: 19 % (ref 24–44)
MAGNESIUM SERPL-MCNC: 1.7 MG/DL (ref 1.6–2.6)
MCH RBC QN AUTO: 31.8 PG (ref 26–34)
MCHC RBC AUTO-ENTMCNC: 32.5 G/DL (ref 31–37)
MCV RBC AUTO: 97.9 FL (ref 80–100)
METER GLUCOSE: 302 MG/DL (ref 65–105)
METER GLUCOSE: 380 MG/DL (ref 65–105)
MONOCYTES NFR BLD: 0.5 K/UL (ref 0.1–1.3)
MONOCYTES NFR BLD: 7 % (ref 1–7)
NEUTROPHILS NFR BLD: 70 % (ref 36–66)
NEUTS SEG NFR BLD: 5.4 K/UL (ref 1.3–9.1)
PLATELET # BLD AUTO: 184 K/UL (ref 150–450)
PMV BLD AUTO: 8.7 FL (ref 6–12)
POTASSIUM SERPL-SCNC: 3.1 MMOL/L (ref 3.7–5.3)
PROT SERPL-MCNC: 6.6 G/DL (ref 6.4–8.3)
RBC # BLD AUTO: 3.34 M/UL (ref 4–5.2)
REASON FOR REJECTION: NORMAL
SODIUM SERPL-SCNC: 139 MMOL/L (ref 135–144)
SPECIMEN SOURCE: NORMAL
TROPONIN I SERPL HS-MCNC: 79 NG/L (ref 0–14)
TROPONIN I SERPL HS-MCNC: 80 NG/L (ref 0–14)
WBC OTHER # BLD: 7.8 K/UL (ref 3.5–11)
ZZ NTE CLEAN UP: ORDERED TEST: NORMAL

## 2023-07-31 PROCEDURE — 2580000003 HC RX 258: Performed by: FAMILY MEDICINE

## 2023-07-31 PROCEDURE — 36415 COLL VENOUS BLD VENIPUNCTURE: CPT

## 2023-07-31 PROCEDURE — 85025 COMPLETE CBC W/AUTO DIFF WBC: CPT

## 2023-07-31 PROCEDURE — 71045 X-RAY EXAM CHEST 1 VIEW: CPT

## 2023-07-31 PROCEDURE — 6360000002 HC RX W HCPCS: Performed by: EMERGENCY MEDICINE

## 2023-07-31 PROCEDURE — 83880 ASSAY OF NATRIURETIC PEPTIDE: CPT

## 2023-07-31 PROCEDURE — 96374 THER/PROPH/DIAG INJ IV PUSH: CPT

## 2023-07-31 PROCEDURE — 83735 ASSAY OF MAGNESIUM: CPT

## 2023-07-31 PROCEDURE — 80053 COMPREHEN METABOLIC PANEL: CPT

## 2023-07-31 PROCEDURE — 6370000000 HC RX 637 (ALT 250 FOR IP): Performed by: FAMILY MEDICINE

## 2023-07-31 PROCEDURE — 2060000000 HC ICU INTERMEDIATE R&B

## 2023-07-31 PROCEDURE — 93005 ELECTROCARDIOGRAM TRACING: CPT | Performed by: FAMILY MEDICINE

## 2023-07-31 PROCEDURE — 85520 HEPARIN ASSAY: CPT

## 2023-07-31 PROCEDURE — 99285 EMERGENCY DEPT VISIT HI MDM: CPT

## 2023-07-31 PROCEDURE — 84484 ASSAY OF TROPONIN QUANT: CPT

## 2023-07-31 PROCEDURE — 6370000000 HC RX 637 (ALT 250 FOR IP): Performed by: EMERGENCY MEDICINE

## 2023-07-31 PROCEDURE — 82947 ASSAY GLUCOSE BLOOD QUANT: CPT

## 2023-07-31 RX ORDER — BUMETANIDE 1 MG/1
1 TABLET ORAL 2 TIMES DAILY
Status: DISCONTINUED | OUTPATIENT
Start: 2023-07-31 | End: 2023-08-08 | Stop reason: HOSPADM

## 2023-07-31 RX ORDER — BUMETANIDE 1 MG/1
2 TABLET ORAL 2 TIMES DAILY
Status: DISCONTINUED | OUTPATIENT
Start: 2023-07-31 | End: 2023-08-05

## 2023-07-31 RX ORDER — INSULIN LISPRO 100 [IU]/ML
15 INJECTION, SOLUTION INTRAVENOUS; SUBCUTANEOUS
Status: DISCONTINUED | OUTPATIENT
Start: 2023-07-31 | End: 2023-08-08

## 2023-07-31 RX ORDER — LANOLIN ALCOHOL/MO/W.PET/CERES
3 CREAM (GRAM) TOPICAL NIGHTLY PRN
Status: DISCONTINUED | OUTPATIENT
Start: 2023-07-31 | End: 2023-08-08 | Stop reason: HOSPADM

## 2023-07-31 RX ORDER — HEPARIN SODIUM 1000 [USP'U]/ML
2000 INJECTION, SOLUTION INTRAVENOUS; SUBCUTANEOUS PRN
Status: DISCONTINUED | OUTPATIENT
Start: 2023-07-31 | End: 2023-08-01

## 2023-07-31 RX ORDER — CYCLOBENZAPRINE HCL 10 MG
5 TABLET ORAL 3 TIMES DAILY PRN
Status: DISCONTINUED | OUTPATIENT
Start: 2023-07-31 | End: 2023-08-08 | Stop reason: HOSPADM

## 2023-07-31 RX ORDER — DOCUSATE SODIUM 100 MG/1
100 CAPSULE, LIQUID FILLED ORAL 2 TIMES DAILY
Status: DISCONTINUED | OUTPATIENT
Start: 2023-07-31 | End: 2023-08-08 | Stop reason: HOSPADM

## 2023-07-31 RX ORDER — SODIUM CHLORIDE 0.9 % (FLUSH) 0.9 %
5-40 SYRINGE (ML) INJECTION EVERY 12 HOURS SCHEDULED
Status: DISCONTINUED | OUTPATIENT
Start: 2023-07-31 | End: 2023-08-08 | Stop reason: HOSPADM

## 2023-07-31 RX ORDER — CARVEDILOL 3.12 MG/1
3.12 TABLET ORAL 2 TIMES DAILY
Status: DISCONTINUED | OUTPATIENT
Start: 2023-07-31 | End: 2023-08-08 | Stop reason: HOSPADM

## 2023-07-31 RX ORDER — ONDANSETRON 4 MG/1
4 TABLET, ORALLY DISINTEGRATING ORAL EVERY 8 HOURS PRN
Status: DISCONTINUED | OUTPATIENT
Start: 2023-07-31 | End: 2023-08-08 | Stop reason: HOSPADM

## 2023-07-31 RX ORDER — ASPIRIN 81 MG/1
81 TABLET, CHEWABLE ORAL DAILY
Status: DISCONTINUED | OUTPATIENT
Start: 2023-07-31 | End: 2023-08-08 | Stop reason: HOSPADM

## 2023-07-31 RX ORDER — INSULIN LISPRO 100 [IU]/ML
0-4 INJECTION, SOLUTION INTRAVENOUS; SUBCUTANEOUS NIGHTLY
Status: DISCONTINUED | OUTPATIENT
Start: 2023-07-31 | End: 2023-08-03

## 2023-07-31 RX ORDER — MIDODRINE HYDROCHLORIDE 5 MG/1
5 TABLET ORAL PRN
Status: DISCONTINUED | OUTPATIENT
Start: 2023-07-31 | End: 2023-08-08 | Stop reason: HOSPADM

## 2023-07-31 RX ORDER — INSULIN LISPRO 100 [IU]/ML
15 INJECTION, SOLUTION INTRAVENOUS; SUBCUTANEOUS
Status: DISCONTINUED | OUTPATIENT
Start: 2023-07-31 | End: 2023-08-01 | Stop reason: SDUPTHER

## 2023-07-31 RX ORDER — OXYMETAZOLINE HCL 0.05 %
1 SPRAY, NON-AEROSOL (ML) NASAL NIGHTLY PRN
COMMUNITY

## 2023-07-31 RX ORDER — ACETAMINOPHEN 325 MG/1
650 TABLET ORAL EVERY 6 HOURS PRN
Status: DISCONTINUED | OUTPATIENT
Start: 2023-07-31 | End: 2023-08-08 | Stop reason: HOSPADM

## 2023-07-31 RX ORDER — GABAPENTIN 100 MG/1
100 CAPSULE ORAL DAILY
Status: DISCONTINUED | OUTPATIENT
Start: 2023-07-31 | End: 2023-08-08 | Stop reason: HOSPADM

## 2023-07-31 RX ORDER — ATORVASTATIN CALCIUM 40 MG/1
40 TABLET, FILM COATED ORAL NIGHTLY
Status: DISCONTINUED | OUTPATIENT
Start: 2023-07-31 | End: 2023-08-08 | Stop reason: HOSPADM

## 2023-07-31 RX ORDER — BUSPIRONE HYDROCHLORIDE 5 MG/1
7.5 TABLET ORAL 3 TIMES DAILY
Status: DISCONTINUED | OUTPATIENT
Start: 2023-07-31 | End: 2023-08-08

## 2023-07-31 RX ORDER — POTASSIUM CHLORIDE 7.45 MG/ML
10 INJECTION INTRAVENOUS PRN
Status: DISCONTINUED | OUTPATIENT
Start: 2023-07-31 | End: 2023-08-08 | Stop reason: HOSPADM

## 2023-07-31 RX ORDER — HEPARIN SODIUM 10000 [USP'U]/100ML
5-30 INJECTION, SOLUTION INTRAVENOUS CONTINUOUS
Status: DISCONTINUED | OUTPATIENT
Start: 2023-07-31 | End: 2023-08-01

## 2023-07-31 RX ORDER — VENLAFAXINE HYDROCHLORIDE 37.5 MG/1
37.5 CAPSULE, EXTENDED RELEASE ORAL
Status: DISCONTINUED | OUTPATIENT
Start: 2023-08-01 | End: 2023-08-08

## 2023-07-31 RX ORDER — MORPHINE SULFATE 2 MG/ML
2 INJECTION, SOLUTION INTRAMUSCULAR; INTRAVENOUS EVERY 4 HOURS PRN
Status: DISCONTINUED | OUTPATIENT
Start: 2023-07-31 | End: 2023-08-07

## 2023-07-31 RX ORDER — SODIUM CHLORIDE 9 MG/ML
INJECTION, SOLUTION INTRAVENOUS PRN
Status: DISCONTINUED | OUTPATIENT
Start: 2023-07-31 | End: 2023-08-08 | Stop reason: HOSPADM

## 2023-07-31 RX ORDER — SODIUM CHLORIDE 0.9 % (FLUSH) 0.9 %
10 SYRINGE (ML) INJECTION PRN
Status: DISCONTINUED | OUTPATIENT
Start: 2023-07-31 | End: 2023-08-08 | Stop reason: HOSPADM

## 2023-07-31 RX ORDER — ALLOPURINOL 100 MG/1
100 TABLET ORAL DAILY
Status: DISCONTINUED | OUTPATIENT
Start: 2023-07-31 | End: 2023-08-08 | Stop reason: HOSPADM

## 2023-07-31 RX ORDER — DEXTROSE MONOHYDRATE 100 MG/ML
INJECTION, SOLUTION INTRAVENOUS CONTINUOUS PRN
Status: DISCONTINUED | OUTPATIENT
Start: 2023-07-31 | End: 2023-08-08 | Stop reason: HOSPADM

## 2023-07-31 RX ORDER — POTASSIUM CHLORIDE 20 MEQ/1
20 TABLET, EXTENDED RELEASE ORAL ONCE
Status: COMPLETED | OUTPATIENT
Start: 2023-07-31 | End: 2023-07-31

## 2023-07-31 RX ORDER — ACETAMINOPHEN 650 MG/1
650 SUPPOSITORY RECTAL EVERY 6 HOURS PRN
Status: DISCONTINUED | OUTPATIENT
Start: 2023-07-31 | End: 2023-08-08 | Stop reason: HOSPADM

## 2023-07-31 RX ORDER — INSULIN GLARGINE 100 [IU]/ML
62 INJECTION, SOLUTION SUBCUTANEOUS 2 TIMES DAILY
Status: DISCONTINUED | OUTPATIENT
Start: 2023-07-31 | End: 2023-08-08

## 2023-07-31 RX ORDER — INSULIN LISPRO 100 [IU]/ML
0-8 INJECTION, SOLUTION INTRAVENOUS; SUBCUTANEOUS
Status: DISCONTINUED | OUTPATIENT
Start: 2023-07-31 | End: 2023-08-03

## 2023-07-31 RX ORDER — MORPHINE SULFATE 4 MG/ML
4 INJECTION, SOLUTION INTRAMUSCULAR; INTRAVENOUS ONCE
Status: COMPLETED | OUTPATIENT
Start: 2023-07-31 | End: 2023-07-31

## 2023-07-31 RX ORDER — ONDANSETRON 2 MG/ML
4 INJECTION INTRAMUSCULAR; INTRAVENOUS EVERY 6 HOURS PRN
Status: DISCONTINUED | OUTPATIENT
Start: 2023-07-31 | End: 2023-08-08 | Stop reason: HOSPADM

## 2023-07-31 RX ORDER — POTASSIUM CHLORIDE 20 MEQ/1
40 TABLET, EXTENDED RELEASE ORAL PRN
Status: DISCONTINUED | OUTPATIENT
Start: 2023-07-31 | End: 2023-08-08 | Stop reason: HOSPADM

## 2023-07-31 RX ORDER — HEPARIN SODIUM 1000 [USP'U]/ML
4000 INJECTION, SOLUTION INTRAVENOUS; SUBCUTANEOUS ONCE
Status: COMPLETED | OUTPATIENT
Start: 2023-07-31 | End: 2023-07-31

## 2023-07-31 RX ORDER — HEPARIN SODIUM 1000 [USP'U]/ML
4000 INJECTION, SOLUTION INTRAVENOUS; SUBCUTANEOUS PRN
Status: DISCONTINUED | OUTPATIENT
Start: 2023-07-31 | End: 2023-08-01

## 2023-07-31 RX ORDER — MAGNESIUM SULFATE 1 G/100ML
1000 INJECTION INTRAVENOUS PRN
Status: DISCONTINUED | OUTPATIENT
Start: 2023-07-31 | End: 2023-08-08 | Stop reason: HOSPADM

## 2023-07-31 RX ORDER — SODIUM BICARBONATE 650 MG/1
1300 TABLET ORAL 2 TIMES DAILY
Status: DISCONTINUED | OUTPATIENT
Start: 2023-07-31 | End: 2023-08-08 | Stop reason: HOSPADM

## 2023-07-31 RX ORDER — SODIUM CHLORIDE 9 MG/ML
INJECTION, SOLUTION INTRAVENOUS CONTINUOUS
Status: DISCONTINUED | OUTPATIENT
Start: 2023-07-31 | End: 2023-08-05

## 2023-07-31 RX ADMIN — BUMETANIDE 2 MG: 1 TABLET ORAL at 21:38

## 2023-07-31 RX ADMIN — TICAGRELOR 90 MG: 90 TABLET ORAL at 21:38

## 2023-07-31 RX ADMIN — POTASSIUM CHLORIDE 20 MEQ: 1500 TABLET, EXTENDED RELEASE ORAL at 17:25

## 2023-07-31 RX ADMIN — INSULIN LISPRO 4 UNITS: 100 INJECTION, SOLUTION INTRAVENOUS; SUBCUTANEOUS at 21:39

## 2023-07-31 RX ADMIN — CARVEDILOL 3.12 MG: 3.12 TABLET, FILM COATED ORAL at 21:38

## 2023-07-31 RX ADMIN — HEPARIN SODIUM 4000 UNITS: 1000 INJECTION INTRAVENOUS; SUBCUTANEOUS at 17:27

## 2023-07-31 RX ADMIN — BUSPIRONE HYDROCHLORIDE 7.5 MG: 5 TABLET ORAL at 21:38

## 2023-07-31 RX ADMIN — INSULIN LISPRO 8 UNITS: 100 INJECTION, SOLUTION INTRAVENOUS; SUBCUTANEOUS at 18:25

## 2023-07-31 RX ADMIN — SODIUM BICARBONATE 1300 MG: 650 TABLET ORAL at 21:38

## 2023-07-31 RX ADMIN — ATORVASTATIN CALCIUM 40 MG: 40 TABLET, FILM COATED ORAL at 21:38

## 2023-07-31 RX ADMIN — SODIUM CHLORIDE: 9 INJECTION, SOLUTION INTRAVENOUS at 21:50

## 2023-07-31 RX ADMIN — INSULIN GLARGINE 62 UNITS: 100 INJECTION, SOLUTION SUBCUTANEOUS at 21:39

## 2023-07-31 RX ADMIN — BUMETANIDE 1 MG: 1 TABLET ORAL at 21:40

## 2023-07-31 RX ADMIN — DOCUSATE SODIUM 100 MG: 100 CAPSULE, LIQUID FILLED ORAL at 21:37

## 2023-07-31 RX ADMIN — MORPHINE SULFATE 4 MG: 4 INJECTION, SOLUTION INTRAMUSCULAR; INTRAVENOUS at 15:19

## 2023-07-31 RX ADMIN — HEPARIN SODIUM 9 UNITS/KG/HR: 10000 INJECTION, SOLUTION INTRAVENOUS at 17:30

## 2023-07-31 ASSESSMENT — PAIN - FUNCTIONAL ASSESSMENT: PAIN_FUNCTIONAL_ASSESSMENT: 0-10

## 2023-07-31 ASSESSMENT — PAIN SCALES - GENERAL
PAINLEVEL_OUTOF10: 5
PAINLEVEL_OUTOF10: 3
PAINLEVEL_OUTOF10: 7

## 2023-07-31 ASSESSMENT — PAIN DESCRIPTION - LOCATION
LOCATION: CHEST
LOCATION: CHEST

## 2023-07-31 ASSESSMENT — PAIN DESCRIPTION - PAIN TYPE: TYPE: ACUTE PAIN

## 2023-07-31 ASSESSMENT — PAIN DESCRIPTION - FREQUENCY: FREQUENCY: CONTINUOUS

## 2023-07-31 ASSESSMENT — PAIN DESCRIPTION - DESCRIPTORS: DESCRIPTORS: DISCOMFORT;CRAMPING

## 2023-07-31 ASSESSMENT — LIFESTYLE VARIABLES
HOW MANY STANDARD DRINKS CONTAINING ALCOHOL DO YOU HAVE ON A TYPICAL DAY: PATIENT DOES NOT DRINK
HOW OFTEN DO YOU HAVE A DRINK CONTAINING ALCOHOL: NEVER

## 2023-07-31 ASSESSMENT — PAIN DESCRIPTION - ORIENTATION: ORIENTATION: MID

## 2023-07-31 NOTE — ED NOTES
At 16:21 patient reporting back and chest pain, pt states the tingling n her arm hasn't gone away. Repeat EKG completed at 16:25:01. Dr. Reji Carrington notified at 16:30. Dr. Reji Carrington waiting for troponin.       Gennaro Lin RN  07/31/23 1640

## 2023-07-31 NOTE — ED PROVIDER NOTES
(*)     Creatinine 2.1 (*)     Est, Glom Filt Rate 26 (*)     Potassium 3.1 (*)     Chloride 97 (*)     Alkaline Phosphatase 137 (*)     All other components within normal limits   TROPONIN - Abnormal; Notable for the following components:    Troponin, High Sensitivity 79 (*)     All other components within normal limits   SPECIMEN REJECTION   MAGNESIUM   TROPONIN   ANTI-XA, UNFRACTIONATED HEPARIN   ANTI-XA, UNFRACTIONATED HEPARIN   ANTI-XA, UNFRACTIONATED HEPARIN       Vitals Reviewed:    Vitals:    07/31/23 1558 07/31/23 1618 07/31/23 1628 07/31/23 1658   BP: (!) 114/44 (!) 112/42 (!) 123/48    Pulse: 72 70 70 70   Resp: 16 21 18 16   Temp:       TempSrc:       SpO2: 100% 100% 100% 100%   Weight:       Height:         MEDICATIONS GIVEN TO PATIENT THIS ENCOUNTER:  Orders Placed This Encounter   Medications    morphine sulfate (PF) injection 4 mg    potassium chloride (KLOR-CON M) extended release tablet 20 mEq    heparin (porcine) injection 4,000 Units    heparin (porcine) injection 4,000 Units    heparin (porcine) injection 2,000 Units    heparin 25,000 units in dextrose 5% 250 mL (premix) infusion     DISCHARGE PRESCRIPTIONS:  New Prescriptions    No medications on file     PHYSICIAN CONSULTS ORDERED THIS ENCOUNTER:  IP CONSULT TO FAMILY MEDICINE  IP CONSULT TO CARDIOLOGY  FINAL IMPRESSION      1. Chest pain, unspecified type    2. Acute electrocardiogram changes    3. Hypokalemia    4. Unstable angina ([address])          DISPOSITION/PLAN   DISPOSITION Admitted 07/31/2023 05:14:21 PM      OUTPATIENT FOLLOW UP THE PATIENT:  No follow-up provider specified.     MD Aissatou Figueroa MD  07/31/23 8438

## 2023-07-31 NOTE — TELEPHONE ENCOUNTER
----- Message from Jeyson Hutchins sent at 7/31/2023  2:31 PM EDT -----  Fistulagram scheduled Thursday 8/10/2023 9:30 am - arrive 8:30 am  Dialysis wanted this day. Per [id number] to tell patient location / [place]on Gambino / arrival time /   ----- Message -----  From: Caren Dave MA  Sent: 7/31/2023   2:25 PM EDT  To: David Ocampo MA    Are you having issues cannulating? Getting Navi Tafoya    If so which needle are you having issues with? Venous    Is the bruit and thrill present? Yes    Is the patient running at their prescribed Blood Flow Rate? No down    Does the patient have a good cleaning number? Yes    Is there any prolonged bleeding after treatment? No    What days does the patient run dialysis so we can ensure if we do schedule the Fistulagram its not on a Dialysis day.    DRAGAN Kline

## 2023-08-01 PROBLEM — R94.31 ACUTE ELECTROCARDIOGRAM CHANGES: Status: ACTIVE | Noted: 2023-08-01

## 2023-08-01 LAB
ANTI-XA UNFRAC HEPARIN: 0.47 IU/L (ref 0.3–0.7)
ANTI-XA UNFRAC HEPARIN: <0.1 IU/L (ref 0.3–0.7)
ANTI-XA UNFRAC HEPARIN: >1.1 IU/L (ref 0.3–0.7)
CHOLEST SERPL-MCNC: 106 MG/DL
CHOLESTEROL/HDL RATIO: 2.7
D DIMER PPP FEU-MCNC: 0.55 UG/ML FEU (ref 0–0.59)
EKG ATRIAL RATE: 70 BPM
EKG P AXIS: 55 DEGREES
EKG P-R INTERVAL: 160 MS
EKG Q-T INTERVAL: 492 MS
EKG QRS DURATION: 94 MS
EKG QTC CALCULATION (BAZETT): 531 MS
EKG R AXIS: 48 DEGREES
EKG T AXIS: 99 DEGREES
EKG VENTRICULAR RATE: 70 BPM
ERYTHROCYTE [DISTWIDTH] IN BLOOD BY AUTOMATED COUNT: 15.8 % (ref 11.5–14.9)
HCT VFR BLD AUTO: 31.6 % (ref 36–46)
HDLC SERPL-MCNC: 39 MG/DL
HGB BLD-MCNC: 10.6 G/DL (ref 12–16)
LDLC SERPL CALC-MCNC: 34 MG/DL (ref 0–130)
MAGNESIUM SERPL-MCNC: 2.1 MG/DL (ref 1.6–2.6)
MCH RBC QN AUTO: 33 PG (ref 26–34)
MCHC RBC AUTO-ENTMCNC: 33.6 G/DL (ref 31–37)
MCV RBC AUTO: 98.3 FL (ref 80–100)
METER GLUCOSE: 209 MG/DL (ref 65–105)
METER GLUCOSE: 252 MG/DL (ref 65–105)
METER GLUCOSE: 253 MG/DL (ref 65–105)
METER GLUCOSE: 388 MG/DL (ref 65–105)
PLATELET # BLD AUTO: 153 K/UL (ref 150–450)
PMV BLD AUTO: 8.7 FL (ref 6–12)
RBC # BLD AUTO: 3.21 M/UL (ref 4–5.2)
TRIGL SERPL-MCNC: 166 MG/DL
WBC OTHER # BLD: 6.6 K/UL (ref 3.5–11)

## 2023-08-01 PROCEDURE — 82947 ASSAY GLUCOSE BLOOD QUANT: CPT

## 2023-08-01 PROCEDURE — 80061 LIPID PANEL: CPT

## 2023-08-01 PROCEDURE — 99223 1ST HOSP IP/OBS HIGH 75: CPT | Performed by: FAMILY MEDICINE

## 2023-08-01 PROCEDURE — 83735 ASSAY OF MAGNESIUM: CPT

## 2023-08-01 PROCEDURE — 2060000000 HC ICU INTERMEDIATE R&B

## 2023-08-01 PROCEDURE — 6360000002 HC RX W HCPCS: Performed by: FAMILY MEDICINE

## 2023-08-01 PROCEDURE — 85520 HEPARIN ASSAY: CPT

## 2023-08-01 PROCEDURE — 85027 COMPLETE CBC AUTOMATED: CPT

## 2023-08-01 PROCEDURE — 93010 ELECTROCARDIOGRAM REPORT: CPT | Performed by: INTERNAL MEDICINE

## 2023-08-01 PROCEDURE — 93005 ELECTROCARDIOGRAM TRACING: CPT | Performed by: FAMILY MEDICINE

## 2023-08-01 PROCEDURE — 85379 FIBRIN DEGRADATION QUANT: CPT

## 2023-08-01 PROCEDURE — 36415 COLL VENOUS BLD VENIPUNCTURE: CPT

## 2023-08-01 PROCEDURE — 99223 1ST HOSP IP/OBS HIGH 75: CPT | Performed by: INTERNAL MEDICINE

## 2023-08-01 PROCEDURE — 6370000000 HC RX 637 (ALT 250 FOR IP): Performed by: FAMILY MEDICINE

## 2023-08-01 RX ADMIN — DOCUSATE SODIUM 100 MG: 100 CAPSULE, LIQUID FILLED ORAL at 09:50

## 2023-08-01 RX ADMIN — MORPHINE SULFATE 2 MG: 2 INJECTION, SOLUTION INTRAMUSCULAR; INTRAVENOUS at 12:29

## 2023-08-01 RX ADMIN — ATORVASTATIN CALCIUM 40 MG: 40 TABLET, FILM COATED ORAL at 20:18

## 2023-08-01 RX ADMIN — SODIUM BICARBONATE 1300 MG: 650 TABLET ORAL at 09:50

## 2023-08-01 RX ADMIN — DOCUSATE SODIUM 100 MG: 100 CAPSULE, LIQUID FILLED ORAL at 20:18

## 2023-08-01 RX ADMIN — INSULIN GLARGINE 62 UNITS: 100 INJECTION, SOLUTION SUBCUTANEOUS at 09:50

## 2023-08-01 RX ADMIN — TICAGRELOR 90 MG: 90 TABLET ORAL at 09:50

## 2023-08-01 RX ADMIN — INSULIN LISPRO 2 UNITS: 100 INJECTION, SOLUTION INTRAVENOUS; SUBCUTANEOUS at 09:49

## 2023-08-01 RX ADMIN — CYCLOBENZAPRINE 5 MG: 10 TABLET, FILM COATED ORAL at 18:25

## 2023-08-01 RX ADMIN — HEPARIN SODIUM 4000 UNITS: 1000 INJECTION INTRAVENOUS; SUBCUTANEOUS at 02:06

## 2023-08-01 RX ADMIN — GABAPENTIN 100 MG: 100 CAPSULE ORAL at 20:22

## 2023-08-01 RX ADMIN — BUMETANIDE 2 MG: 1 TABLET ORAL at 20:18

## 2023-08-01 RX ADMIN — BUMETANIDE 2 MG: 1 TABLET ORAL at 09:50

## 2023-08-01 RX ADMIN — BUSPIRONE HYDROCHLORIDE 7.5 MG: 5 TABLET ORAL at 20:19

## 2023-08-01 RX ADMIN — INSULIN LISPRO 15 UNITS: 100 INJECTION, SOLUTION INTRAVENOUS; SUBCUTANEOUS at 18:30

## 2023-08-01 RX ADMIN — MORPHINE SULFATE 2 MG: 2 INJECTION, SOLUTION INTRAMUSCULAR; INTRAVENOUS at 16:33

## 2023-08-01 RX ADMIN — BUSPIRONE HYDROCHLORIDE 7.5 MG: 5 TABLET ORAL at 14:16

## 2023-08-01 RX ADMIN — INSULIN LISPRO 8 UNITS: 100 INJECTION, SOLUTION INTRAVENOUS; SUBCUTANEOUS at 12:20

## 2023-08-01 RX ADMIN — BUSPIRONE HYDROCHLORIDE 7.5 MG: 5 TABLET ORAL at 09:50

## 2023-08-01 RX ADMIN — INSULIN GLARGINE 62 UNITS: 100 INJECTION, SOLUTION SUBCUTANEOUS at 20:19

## 2023-08-01 RX ADMIN — Medication 3 MG: at 20:17

## 2023-08-01 RX ADMIN — INSULIN LISPRO 4 UNITS: 100 INJECTION, SOLUTION INTRAVENOUS; SUBCUTANEOUS at 18:24

## 2023-08-01 RX ADMIN — ALLOPURINOL 100 MG: 100 TABLET ORAL at 09:50

## 2023-08-01 RX ADMIN — MORPHINE SULFATE 2 MG: 2 INJECTION, SOLUTION INTRAMUSCULAR; INTRAVENOUS at 03:14

## 2023-08-01 RX ADMIN — INSULIN LISPRO 15 UNITS: 100 INJECTION, SOLUTION INTRAVENOUS; SUBCUTANEOUS at 12:19

## 2023-08-01 RX ADMIN — ASPIRIN 81 MG 81 MG: 81 TABLET ORAL at 09:50

## 2023-08-01 RX ADMIN — SODIUM BICARBONATE 1300 MG: 650 TABLET ORAL at 20:18

## 2023-08-01 RX ADMIN — VENLAFAXINE HYDROCHLORIDE 37.5 MG: 37.5 CAPSULE, EXTENDED RELEASE ORAL at 10:02

## 2023-08-01 RX ADMIN — TICAGRELOR 90 MG: 90 TABLET ORAL at 20:18

## 2023-08-01 ASSESSMENT — PAIN DESCRIPTION - LOCATION
LOCATION: CHEST;BACK;ARM
LOCATION: CHEST
LOCATION: CHEST;BACK
LOCATION: CHEST
LOCATION: BACK

## 2023-08-01 ASSESSMENT — PAIN DESCRIPTION - DESCRIPTORS
DESCRIPTORS: DULL;ACHING
DESCRIPTORS: SHARP
DESCRIPTORS: DULL
DESCRIPTORS: STABBING
DESCRIPTORS: DISCOMFORT

## 2023-08-01 ASSESSMENT — PAIN SCALES - GENERAL
PAINLEVEL_OUTOF10: 9
PAINLEVEL_OUTOF10: 6
PAINLEVEL_OUTOF10: 9
PAINLEVEL_OUTOF10: 6
PAINLEVEL_OUTOF10: 6

## 2023-08-01 ASSESSMENT — PAIN DESCRIPTION - ORIENTATION
ORIENTATION: MID
ORIENTATION: MID

## 2023-08-01 NOTE — H&P
Family Medicine Admit Note    PCP: Elissa Lefort, APRN - CNP    Date of Admission: 7/31/2023    Date of Service: Pt seen/examined on 8/1/23 and Admitted to Inpatient. Chief Complaint:  Chest Pain      History Of Present Illness: The patient is a 70 y.o. female who presents to [facility] with complaints of midsternal chest pain that radiated to her right arm that began yesterday suddenly about 1 hour before she arrived to the ED. She was within the last 15 minutes of her dialysis when the pain started. She was given NTG and ASA prior to arriving in the ED. Her chest pain improved but did not resolve, this am her pain is about the same. She has multiple co-morbid conditions and a hx of CABG with 2 stents placed. She denies any fevers, chills, cough, congestion, abdominal pain, N/V/D, headaches, numbness or tingling.     Past Medical History:        Diagnosis Date    Arthritis     Backache, unspecified     CAD (coronary artery disease)     Cerebral artery occlusion with cerebral infarction (HCC)     CHF (congestive heart failure) (HCC)     Chronic kidney disease     Coronary atherosclerosis of artery bypass graft     COVID 01/31/2022    Cramp of limb     Gallstones     Hemodialysis patient ([address] )     [address]  /  [address]    Hyperlipidemia     Hypertension     Insomnia     Neuromuscular disorder ([address] )     Pneumonia     Psychiatric problem     Thyroid disease     Type II 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

## 2023-08-01 NOTE — CARE COORDINATION
Case Management Assessment  Initial Evaluation    Date/Time of Evaluation: 8/1/2023 1:15 PM  Assessment Completed by: Chrissy Mathews RN    If patient is discharged prior to next notation, then this note serves as note for discharge by case management. Patient Name: Hemant Ramsay                   YOB: 1960  Diagnosis: Unstable angina ([address]) [I20.0]  Hypokalemia [E87.6]  Acute electrocardiogram changes [R94.31]  Chest pain, unspecified type [R07.9]                   Date / Time: 7/31/2023  2:23 PM    Patient Admission Status: Inpatient   Readmission Risk (Low < 19, Mod (19-27), High > 27): Readmission Risk Score: 34.2    Current PCP: AFSANEH Garza CNP  PCP verified by CM? Yes    Chart Reviewed: Yes      History Provided by: Patient  Patient Orientation: Alert and Oriented    Patient Cognition: Alert    Hospitalization in the last 30 days (Readmission):  No    If yes, Readmission Assessment in CM Navigator will be completed. Advance Directives:      Code Status: Full Code   Patient's Primary Decision Maker is: Legal Next of Kin    Primary Decision Maker: Hailey Guido - Brother/Sister - [phone]    Primary Decision Maker: Brett Castano - Brother/Sister - [phone]    Discharge Planning:    Patient lives with: Alone Type of Home: House  Primary Care Giver: Self  Patient Support Systems include: Family Members   Current Financial resources: Medicare  Current community resources:    Current services prior to admission: None            Current DME:              Type of Home Care services:  None    ADLS  Prior functional level: Independent in ADLs/IADLs  Current functional level: Independent in ADLs/IADLs    PT AM-PAC:   /24  OT AM-PAC:   /24    Family can provide assistance at DC: Yes  Would you like Case Management to discuss the discharge plan with any other family members/significant others, and if so, who?  Yes  Plans to Return to Present Housing: Yes  Other Identified

## 2023-08-01 NOTE — FLOWSHEET NOTE
08/01/23 1700   Treatment Team Notification   Reason for Communication Review case   Name of Team Member Notified Dr. Vicky Tillman Provider   Method of Communication Call   Response See orders   Notification Time [phone]     Notified physician about pt condition. Pt complain on chest pain radiating to the L arm and back. Pt states it is a stabbing pain and she is dizzy. Dr. Armin Deras ordered a EKG, D-dimer, and VQ scan.

## 2023-08-01 NOTE — FLOWSHEET NOTE
08/01/23 0816   Treatment Team Notification   Reason for Communication Review case   Name of Team Member Notified Dr. Corwin Smith Team Role Attending Provider   Method of Communication Secure Message   Response No new orders   Notification Time 0800     RN saw there was two orders for PO Bumex one being 1mg and the other being 2mg. Reached out to Dr. Marleny Hong to clarify. Per physician, holding the 1mg PO Bumex and giving the 2mg PO Bumex until cardiology confirms dosing.

## 2023-08-01 NOTE — FLOWSHEET NOTE
08/01/23 1336   Treatment Team Notification   Reason for Communication Review case   Name of Team Member Notified Dr. Ameena Gordillo Team Role Attending Provider   Method of Communication Secure Message   Response No new orders   Notification Time [phone]     RN notified Dr. Edmund Montez of pt blood sugar of 388 per sliding scale. RN gave 8 units of insulin per sliding scale along with 15 units that was scheduled.

## 2023-08-01 NOTE — ACP (ADVANCE CARE PLANNING)
Advance Care Planning     Advance Care Planning Activator (Inpatient)  Conversation Note      Date of ACP Conversation: 8/1/2023     Conversation Conducted with: Patient with Decision Making Capacity    ACP Activator: Man Duarte RN        Health Care Decision Maker:     Current Designated Health Care Decision Maker:     Primary Decision Maker: Laura Silva - Brother/Sister - [phone]    Primary Decision Maker: Katheryn Lyles - Brother/Sister - [phone]  Click here to complete Healthcare Decision Makers including section of the Healthcare Decision Maker Relationship (ie \"Primary\")      Care Preferences    Ventilation: \"If you were in your present state of health and suddenly became very ill and were unable to breathe on your own, what would your preference be about the use of a ventilator (breathing machine) if it were available to you? \"      Would the patient desire the use of ventilator (breathing machine)?: yes    \"If your health worsens and it becomes clear that your chance of recovery is unlikely, what would your preference be about the use of a ventilator (breathing machine) if it were available to you? \"     Would the patient desire the use of ventilator (breathing machine)?: Yes      Resuscitation  \"CPR works best to restart the heart when there is a sudden event, like a heart attack, in someone who is otherwise healthy. Unfortunately, CPR does not typically restart the heart for people who have serious health conditions or who are very sick. \"    \"In the event your heart stopped as a result of an underlying serious health condition, would you want attempts to be made to restart your heart (answer \"yes\" for attempt to resuscitate) or would you prefer a natural death (answer \"no\" for do not attempt to resuscitate)? \" yes       [] Yes   [] No   Educated Patient / Saleem Romanr regarding differences between Advance Directives and portable DNR orders.     Length of ACP Conversation in minutes:

## 2023-08-02 ENCOUNTER — APPOINTMENT (OUTPATIENT)
Dept: NUCLEAR MEDICINE | Age: 63
DRG: 302 | End: 2023-08-02
Payer: COMMERCIAL

## 2023-08-02 ENCOUNTER — CARE COORDINATION (OUTPATIENT)
Dept: CARE COORDINATION | Age: 63
End: 2023-08-02

## 2023-08-02 ENCOUNTER — APPOINTMENT (OUTPATIENT)
Dept: GENERAL RADIOLOGY | Age: 63
DRG: 302 | End: 2023-08-02
Payer: COMMERCIAL

## 2023-08-02 LAB
ALBUMIN SERPL-MCNC: 3 G/DL (ref 3.5–5.2)
ANION GAP SERPL CALCULATED.3IONS-SCNC: 14 MMOL/L (ref 9–17)
BASOPHILS # BLD: 0 K/UL (ref 0–0.2)
BASOPHILS NFR BLD: 1 % (ref 0–2)
BUN SERPL-MCNC: 33 MG/DL (ref 8–23)
CALCIUM SERPL-MCNC: 8.3 MG/DL (ref 8.6–10.4)
CHLORIDE SERPL-SCNC: 96 MMOL/L (ref 98–107)
CO2 SERPL-SCNC: 25 MMOL/L (ref 20–31)
CREAT SERPL-MCNC: 3.3 MG/DL (ref 0.5–0.9)
EKG ATRIAL RATE: 62 BPM
EKG P AXIS: 79 DEGREES
EKG P-R INTERVAL: 152 MS
EKG Q-T INTERVAL: 482 MS
EKG QRS DURATION: 94 MS
EKG QTC CALCULATION (BAZETT): 489 MS
EKG R AXIS: 14 DEGREES
EKG T AXIS: 68 DEGREES
EKG VENTRICULAR RATE: 62 BPM
EOSINOPHIL # BLD: 0.1 K/UL (ref 0–0.4)
EOSINOPHILS RELATIVE PERCENT: 3 % (ref 0–4)
ERYTHROCYTE [DISTWIDTH] IN BLOOD BY AUTOMATED COUNT: 15.7 % (ref 11.5–14.9)
GFR SERPL CREATININE-BSD FRML MDRD: 15 ML/MIN/1.73M2
GLUCOSE SERPL-MCNC: 367 MG/DL (ref 70–99)
HCT VFR BLD AUTO: 28.2 % (ref 36–46)
HGB BLD-MCNC: 9.6 G/DL (ref 12–16)
LYMPHOCYTES NFR BLD: 0.8 K/UL (ref 1–4.8)
LYMPHOCYTES RELATIVE PERCENT: 14 % (ref 24–44)
MCH RBC QN AUTO: 33.3 PG (ref 26–34)
MCHC RBC AUTO-ENTMCNC: 34 G/DL (ref 31–37)
MCV RBC AUTO: 98 FL (ref 80–100)
METER GLUCOSE: 236 MG/DL (ref 65–105)
METER GLUCOSE: 296 MG/DL (ref 65–105)
METER GLUCOSE: 319 MG/DL (ref 65–105)
METER GLUCOSE: 365 MG/DL (ref 65–105)
MONOCYTES NFR BLD: 0.3 K/UL (ref 0.1–1.3)
MONOCYTES NFR BLD: 6 % (ref 1–7)
NEUTROPHILS NFR BLD: 76 % (ref 36–66)
NEUTS SEG NFR BLD: 4.4 K/UL (ref 1.3–9.1)
PHOSPHATE SERPL-MCNC: 4.1 MG/DL (ref 2.6–4.5)
PLATELET # BLD AUTO: 132 K/UL (ref 150–450)
PMV BLD AUTO: 8.7 FL (ref 6–12)
POTASSIUM SERPL-SCNC: 3.7 MMOL/L (ref 3.7–5.3)
RBC # BLD AUTO: 2.88 M/UL (ref 4–5.2)
SODIUM SERPL-SCNC: 135 MMOL/L (ref 135–144)
TROPONIN I SERPL HS-MCNC: 83 NG/L (ref 0–14)
TROPONIN I SERPL HS-MCNC: 83 NG/L (ref 0–14)
WBC OTHER # BLD: 5.7 K/UL (ref 3.5–11)

## 2023-08-02 PROCEDURE — 6370000000 HC RX 637 (ALT 250 FOR IP): Performed by: FAMILY MEDICINE

## 2023-08-02 PROCEDURE — 2580000003 HC RX 258: Performed by: NURSE PRACTITIONER

## 2023-08-02 PROCEDURE — A9539 TC99M PENTETATE: HCPCS | Performed by: NURSE PRACTITIONER

## 2023-08-02 PROCEDURE — 93010 ELECTROCARDIOGRAM REPORT: CPT | Performed by: INTERNAL MEDICINE

## 2023-08-02 PROCEDURE — 85025 COMPLETE CBC W/AUTO DIFF WBC: CPT

## 2023-08-02 PROCEDURE — 3430000000 HC RX DIAGNOSTIC RADIOPHARMACEUTICAL: Performed by: NURSE PRACTITIONER

## 2023-08-02 PROCEDURE — 6360000002 HC RX W HCPCS: Performed by: FAMILY MEDICINE

## 2023-08-02 PROCEDURE — 71045 X-RAY EXAM CHEST 1 VIEW: CPT

## 2023-08-02 PROCEDURE — 84484 ASSAY OF TROPONIN QUANT: CPT

## 2023-08-02 PROCEDURE — 93005 ELECTROCARDIOGRAM TRACING: CPT | Performed by: INTERNAL MEDICINE

## 2023-08-02 PROCEDURE — 2580000003 HC RX 258: Performed by: FAMILY MEDICINE

## 2023-08-02 PROCEDURE — 82947 ASSAY GLUCOSE BLOOD QUANT: CPT

## 2023-08-02 PROCEDURE — 5A1D70Z PERFORMANCE OF URINARY FILTRATION, INTERMITTENT, LESS THAN 6 HOURS PER DAY: ICD-10-PCS | Performed by: INTERNAL MEDICINE

## 2023-08-02 PROCEDURE — 2060000000 HC ICU INTERMEDIATE R&B

## 2023-08-02 PROCEDURE — 99232 SBSQ HOSP IP/OBS MODERATE 35: CPT | Performed by: FAMILY MEDICINE

## 2023-08-02 PROCEDURE — 78582 LUNG VENTILAT&PERFUS IMAGING: CPT

## 2023-08-02 PROCEDURE — 36415 COLL VENOUS BLD VENIPUNCTURE: CPT

## 2023-08-02 PROCEDURE — 90935 HEMODIALYSIS ONE EVALUATION: CPT

## 2023-08-02 PROCEDURE — 80069 RENAL FUNCTION PANEL: CPT

## 2023-08-02 PROCEDURE — A9540 TC99M MAA: HCPCS | Performed by: NURSE PRACTITIONER

## 2023-08-02 RX ORDER — HEPARIN SODIUM 5000 [USP'U]/ML
5000 INJECTION, SOLUTION INTRAVENOUS; SUBCUTANEOUS EVERY 8 HOURS SCHEDULED
Status: DISCONTINUED | OUTPATIENT
Start: 2023-08-02 | End: 2023-08-08 | Stop reason: HOSPADM

## 2023-08-02 RX ORDER — SODIUM CHLORIDE 0.9 % (FLUSH) 0.9 %
10 SYRINGE (ML) INJECTION PRN
Status: DISCONTINUED | OUTPATIENT
Start: 2023-08-02 | End: 2023-08-08 | Stop reason: HOSPADM

## 2023-08-02 RX ORDER — KIT FOR THE PREPARATION OF TECHNETIUM TC 99M PENTETATE 20 MG/1
44 INJECTION, POWDER, LYOPHILIZED, FOR SOLUTION INTRAVENOUS; RESPIRATORY (INHALATION)
Status: COMPLETED | OUTPATIENT
Start: 2023-08-02 | End: 2023-08-02

## 2023-08-02 RX ADMIN — SODIUM CHLORIDE: 9 INJECTION, SOLUTION INTRAVENOUS at 17:46

## 2023-08-02 RX ADMIN — INSULIN GLARGINE 62 UNITS: 100 INJECTION, SOLUTION SUBCUTANEOUS at 20:34

## 2023-08-02 RX ADMIN — DOCUSATE SODIUM 100 MG: 100 CAPSULE, LIQUID FILLED ORAL at 08:47

## 2023-08-02 RX ADMIN — INSULIN LISPRO 4 UNITS: 100 INJECTION, SOLUTION INTRAVENOUS; SUBCUTANEOUS at 20:44

## 2023-08-02 RX ADMIN — SODIUM BICARBONATE 1300 MG: 650 TABLET ORAL at 20:44

## 2023-08-02 RX ADMIN — MORPHINE SULFATE 2 MG: 2 INJECTION, SOLUTION INTRAMUSCULAR; INTRAVENOUS at 19:24

## 2023-08-02 RX ADMIN — BUMETANIDE 1 MG: 1 TABLET ORAL at 08:47

## 2023-08-02 RX ADMIN — SODIUM CHLORIDE, PRESERVATIVE FREE 10 ML: 5 INJECTION INTRAVENOUS at 10:01

## 2023-08-02 RX ADMIN — INSULIN LISPRO 6 UNITS: 100 INJECTION, SOLUTION INTRAVENOUS; SUBCUTANEOUS at 17:45

## 2023-08-02 RX ADMIN — INSULIN LISPRO 15 UNITS: 100 INJECTION, SOLUTION INTRAVENOUS; SUBCUTANEOUS at 08:48

## 2023-08-02 RX ADMIN — KIT FOR THE PREPARATION OF TECHNETIUM TC 99M PENTETATE 52.5 MILLICURIE: 20 INJECTION, POWDER, LYOPHILIZED, FOR SOLUTION INTRAVENOUS; RESPIRATORY (INHALATION) at 09:46

## 2023-08-02 RX ADMIN — HEPARIN SODIUM 5000 UNITS: 5000 INJECTION INTRAVENOUS; SUBCUTANEOUS at 20:44

## 2023-08-02 RX ADMIN — INSULIN LISPRO 15 UNITS: 100 INJECTION, SOLUTION INTRAVENOUS; SUBCUTANEOUS at 17:44

## 2023-08-02 RX ADMIN — VENLAFAXINE HYDROCHLORIDE 37.5 MG: 37.5 CAPSULE, EXTENDED RELEASE ORAL at 08:51

## 2023-08-02 RX ADMIN — BUSPIRONE HYDROCHLORIDE 7.5 MG: 5 TABLET ORAL at 15:24

## 2023-08-02 RX ADMIN — INSULIN LISPRO 2 UNITS: 100 INJECTION, SOLUTION INTRAVENOUS; SUBCUTANEOUS at 08:48

## 2023-08-02 RX ADMIN — TICAGRELOR 90 MG: 90 TABLET ORAL at 20:36

## 2023-08-02 RX ADMIN — ALLOPURINOL 100 MG: 100 TABLET ORAL at 08:47

## 2023-08-02 RX ADMIN — TICAGRELOR 90 MG: 90 TABLET ORAL at 08:47

## 2023-08-02 RX ADMIN — BUSPIRONE HYDROCHLORIDE 7.5 MG: 5 TABLET ORAL at 20:36

## 2023-08-02 RX ADMIN — Medication 7.5 MILLICURIE: at 10:01

## 2023-08-02 RX ADMIN — CYCLOBENZAPRINE 5 MG: 10 TABLET, FILM COATED ORAL at 20:35

## 2023-08-02 RX ADMIN — ASPIRIN 81 MG 81 MG: 81 TABLET ORAL at 08:47

## 2023-08-02 RX ADMIN — BUMETANIDE 2 MG: 1 TABLET ORAL at 20:35

## 2023-08-02 RX ADMIN — SODIUM BICARBONATE 1300 MG: 650 TABLET ORAL at 08:47

## 2023-08-02 RX ADMIN — DOCUSATE SODIUM 100 MG: 100 CAPSULE, LIQUID FILLED ORAL at 20:37

## 2023-08-02 RX ADMIN — GABAPENTIN 100 MG: 100 CAPSULE ORAL at 20:36

## 2023-08-02 RX ADMIN — INSULIN GLARGINE 62 UNITS: 100 INJECTION, SOLUTION SUBCUTANEOUS at 08:48

## 2023-08-02 RX ADMIN — BUMETANIDE 1 MG: 1 TABLET ORAL at 20:44

## 2023-08-02 RX ADMIN — BUMETANIDE 2 MG: 1 TABLET ORAL at 08:48

## 2023-08-02 RX ADMIN — BUSPIRONE HYDROCHLORIDE 7.5 MG: 5 TABLET ORAL at 08:51

## 2023-08-02 RX ADMIN — ATORVASTATIN CALCIUM 40 MG: 40 TABLET, FILM COATED ORAL at 20:36

## 2023-08-02 ASSESSMENT — PAIN SCALES - GENERAL
PAINLEVEL_OUTOF10: 7
PAINLEVEL_OUTOF10: 0

## 2023-08-02 ASSESSMENT — PAIN DESCRIPTION - DESCRIPTORS: DESCRIPTORS: SORE;SPASM;TIGHTNESS

## 2023-08-02 ASSESSMENT — PAIN DESCRIPTION - LOCATION: LOCATION: CHEST

## 2023-08-02 ASSESSMENT — PAIN DESCRIPTION - ONSET: ONSET: SUDDEN

## 2023-08-02 ASSESSMENT — PAIN DESCRIPTION - ORIENTATION: ORIENTATION: MID

## 2023-08-02 NOTE — CARE COORDINATION
ONGOING DISCHARGE PLAN:    Patient is alert and oriented x4. Spoke with patient regarding discharge plan and patient confirms that plan is still home with [address]. HD-today     VQ-low probability    Stress test ordered per cardio, need to wait 48 hours after VQ     Will continue to follow for additional discharge needs. If patient is discharged prior to next notation, then this note serves as note for discharge by case management.     Electronically signed by Eva Rolon RN on 8/2/2023 at 1:41 PM

## 2023-08-02 NOTE — CARE COORDINATION
Remote Patient Monitoring Note  Date/Time:  8/2/2023 2:29 PM  Patient Current Location: [place]  RN  reviewed HRS yellow alert for no VS x 2 days. Background: Pt enrolled in RPM r/t HTN, CHF. Clinical Interventions: Reviewed and followed up on alerts and treatments-Chart review completed. Pt admitted to [address] [place] [address] 7/31/2023. RN will pause RPM. Will send message to alert ACM and request notice be sent to RPM pool upon pt d/c from hospital.     Plan/Follow Up: Will continue to review, monitor and address alerts with follow up based on severity of symptoms and risk factors.

## 2023-08-02 NOTE — DISCHARGE INSTR - COC
Continuity of Care Form    Patient Name: Melina Franco   :  1960  MRN:  [id number]    Admit date:  2023  Discharge date:  23    Code Status Order: Full Code   Advance Directives:     Admitting Physician:  Liliana Sheldon MD  PCP: AFSANEH Tiwari CNP    Discharging Nurse: [facility] [facility] Unit/Room#: 2114/2114-01  Discharging Unit Phone Number: [phone]    Emergency Contact:   Extended Emergency Contact Information  Primary Emergency Contact: Hailey Guido  Home Phone: [phone]  Relation: Brother/Sister  Secondary Emergency Contact: [address] [facility],[address] Phone: [phone]  Relation: Brother/Sister    Past Surgical History:  Past Surgical History:   Procedure Laterality Date    ANKLE FRACTURE SURGERY      AV FISTULA CREATION  2021    BACK SURGERY      BREAST SURGERY      CARDIAC CATHETERIZATION      MVD  /  CT CONSULT    CARDIAC SURGERY      CARPAL TUNNEL RELEASE      CHOLECYSTECTOMY, OPEN N/A     COLONOSCOPY      CORONARY ANGIOPLASTY WITH STENT PLACEMENT  2023    PTCA / FADIA RCA    CORONARY ANGIOPLASTY WITH STENT PLACEMENT  2019    STENT X1  [facility] GRAFT  2005    [facility] Right 2023    CVC CATHETER REPLACEMENT right chest performed by Pranav Muhammad MD at [facility] Left 2021    LEFT AV FISTULA CREATION UPPER EXTREMITY performed by Ajit Price MD at [facility] 2023    LEFT AV FISTULA REVISION WITH SUPERFICIALIZATION performed by Pranav Muhammad MD at [facility]      IR TUNNELED CATHETER PLACEMENT GREATER THAN 5 YEARS  2021    IR TUNNELED CATHETER PLACEMENT GREATER THAN 5 YEARS 2021 [facility]    IR TUNNELED CATHETER PLACEMENT GREATER THAN 5 YEARS  2023    IR TUNNELED CATHETER PLACEMENT GREATER

## 2023-08-03 PROBLEM — R77.8 ELEVATED TROPONIN: Status: ACTIVE | Noted: 2023-08-03

## 2023-08-03 PROBLEM — I15.0 RENOVASCULAR HYPERTENSION: Status: ACTIVE | Noted: 2023-08-03

## 2023-08-03 PROBLEM — R79.89 ELEVATED TROPONIN: Status: ACTIVE | Noted: 2023-08-03

## 2023-08-03 LAB
METER GLUCOSE: 197 MG/DL (ref 65–105)
METER GLUCOSE: 311 MG/DL (ref 65–105)
METER GLUCOSE: 318 MG/DL (ref 65–105)

## 2023-08-03 PROCEDURE — 6360000002 HC RX W HCPCS: Performed by: FAMILY MEDICINE

## 2023-08-03 PROCEDURE — 99232 SBSQ HOSP IP/OBS MODERATE 35: CPT | Performed by: FAMILY MEDICINE

## 2023-08-03 PROCEDURE — 99233 SBSQ HOSP IP/OBS HIGH 50: CPT | Performed by: INTERNAL MEDICINE

## 2023-08-03 PROCEDURE — 2580000003 HC RX 258: Performed by: FAMILY MEDICINE

## 2023-08-03 PROCEDURE — 82947 ASSAY GLUCOSE BLOOD QUANT: CPT

## 2023-08-03 PROCEDURE — 6370000000 HC RX 637 (ALT 250 FOR IP): Performed by: FAMILY MEDICINE

## 2023-08-03 PROCEDURE — 2060000000 HC ICU INTERMEDIATE R&B

## 2023-08-03 RX ORDER — INSULIN LISPRO 100 [IU]/ML
0-16 INJECTION, SOLUTION INTRAVENOUS; SUBCUTANEOUS
Status: DISCONTINUED | OUTPATIENT
Start: 2023-08-03 | End: 2023-08-05

## 2023-08-03 RX ORDER — INSULIN LISPRO 100 [IU]/ML
0-4 INJECTION, SOLUTION INTRAVENOUS; SUBCUTANEOUS NIGHTLY
Status: DISCONTINUED | OUTPATIENT
Start: 2023-08-03 | End: 2023-08-08

## 2023-08-03 RX ADMIN — BUSPIRONE HYDROCHLORIDE 7.5 MG: 5 TABLET ORAL at 14:39

## 2023-08-03 RX ADMIN — ASPIRIN 81 MG 81 MG: 81 TABLET ORAL at 10:03

## 2023-08-03 RX ADMIN — HEPARIN SODIUM 5000 UNITS: 5000 INJECTION INTRAVENOUS; SUBCUTANEOUS at 14:40

## 2023-08-03 RX ADMIN — HEPARIN SODIUM 5000 UNITS: 5000 INJECTION INTRAVENOUS; SUBCUTANEOUS at 06:19

## 2023-08-03 RX ADMIN — BUMETANIDE 2 MG: 1 TABLET ORAL at 21:24

## 2023-08-03 RX ADMIN — BUSPIRONE HYDROCHLORIDE 7.5 MG: 5 TABLET ORAL at 10:03

## 2023-08-03 RX ADMIN — INSULIN LISPRO 15 UNITS: 100 INJECTION, SOLUTION INTRAVENOUS; SUBCUTANEOUS at 13:03

## 2023-08-03 RX ADMIN — INSULIN GLARGINE 62 UNITS: 100 INJECTION, SOLUTION SUBCUTANEOUS at 10:04

## 2023-08-03 RX ADMIN — INSULIN LISPRO 2 UNITS: 100 INJECTION, SOLUTION INTRAVENOUS; SUBCUTANEOUS at 10:05

## 2023-08-03 RX ADMIN — BUMETANIDE 2 MG: 1 TABLET ORAL at 10:19

## 2023-08-03 RX ADMIN — MAGNESIUM HYDROXIDE 30 ML: 400 SUSPENSION ORAL at 21:35

## 2023-08-03 RX ADMIN — CARVEDILOL 3.12 MG: 3.12 TABLET, FILM COATED ORAL at 10:03

## 2023-08-03 RX ADMIN — DOCUSATE SODIUM 100 MG: 100 CAPSULE, LIQUID FILLED ORAL at 21:24

## 2023-08-03 RX ADMIN — SODIUM CHLORIDE, PRESERVATIVE FREE 10 ML: 5 INJECTION INTRAVENOUS at 21:26

## 2023-08-03 RX ADMIN — ALLOPURINOL 100 MG: 100 TABLET ORAL at 10:03

## 2023-08-03 RX ADMIN — BUMETANIDE 1 MG: 1 TABLET ORAL at 21:26

## 2023-08-03 RX ADMIN — ATORVASTATIN CALCIUM 40 MG: 40 TABLET, FILM COATED ORAL at 21:24

## 2023-08-03 RX ADMIN — BUSPIRONE HYDROCHLORIDE 7.5 MG: 5 TABLET ORAL at 21:25

## 2023-08-03 RX ADMIN — INSULIN LISPRO 12 UNITS: 100 INJECTION, SOLUTION INTRAVENOUS; SUBCUTANEOUS at 13:03

## 2023-08-03 RX ADMIN — SODIUM BICARBONATE 1300 MG: 650 TABLET ORAL at 21:24

## 2023-08-03 RX ADMIN — INSULIN GLARGINE 62 UNITS: 100 INJECTION, SOLUTION SUBCUTANEOUS at 21:26

## 2023-08-03 RX ADMIN — TICAGRELOR 90 MG: 90 TABLET ORAL at 21:24

## 2023-08-03 RX ADMIN — HEPARIN SODIUM 5000 UNITS: 5000 INJECTION INTRAVENOUS; SUBCUTANEOUS at 21:24

## 2023-08-03 RX ADMIN — TICAGRELOR 90 MG: 90 TABLET ORAL at 10:03

## 2023-08-03 RX ADMIN — DOCUSATE SODIUM 100 MG: 100 CAPSULE, LIQUID FILLED ORAL at 10:03

## 2023-08-03 RX ADMIN — SODIUM BICARBONATE 1300 MG: 650 TABLET ORAL at 10:03

## 2023-08-03 RX ADMIN — BUMETANIDE 1 MG: 1 TABLET ORAL at 10:19

## 2023-08-03 RX ADMIN — INSULIN LISPRO 15 UNITS: 100 INJECTION, SOLUTION INTRAVENOUS; SUBCUTANEOUS at 10:05

## 2023-08-03 RX ADMIN — INSULIN LISPRO 4 UNITS: 100 INJECTION, SOLUTION INTRAVENOUS; SUBCUTANEOUS at 21:25

## 2023-08-03 RX ADMIN — VENLAFAXINE HYDROCHLORIDE 37.5 MG: 37.5 CAPSULE, EXTENDED RELEASE ORAL at 10:24

## 2023-08-03 RX ADMIN — GABAPENTIN 100 MG: 100 CAPSULE ORAL at 21:25

## 2023-08-03 NOTE — CARE COORDINATION
ONGOING DISCHARGE PLAN:    Patient is alert and oriented x4. Spoke with patient regarding discharge plan and patient confirms that plan is still home with [address]. VQ low probability    Plan for stress test on 8/4    Will continue to follow for additional discharge needs. If patient is discharged prior to next notation, then this note serves as note for discharge by case management.     Electronically signed by Cameron Saint, RN on 8/3/2023 at 11:41 AM

## 2023-08-04 ENCOUNTER — APPOINTMENT (OUTPATIENT)
Dept: NUCLEAR MEDICINE | Age: 63
DRG: 302 | End: 2023-08-04
Payer: COMMERCIAL

## 2023-08-04 ENCOUNTER — HOSPITAL ENCOUNTER (INPATIENT)
Age: 63
Discharge: HOME | DRG: 302 | End: 2023-08-06
Payer: COMMERCIAL

## 2023-08-04 LAB
ECHO BSA: 2.16 M2
EKG ATRIAL RATE: 72 BPM
EKG ATRIAL RATE: 77 BPM
EKG P AXIS: 41 DEGREES
EKG P AXIS: 72 DEGREES
EKG P-R INTERVAL: 154 MS
EKG P-R INTERVAL: 160 MS
EKG Q-T INTERVAL: 376 MS
EKG Q-T INTERVAL: 430 MS
EKG QRS DURATION: 92 MS
EKG QRS DURATION: 94 MS
EKG QTC CALCULATION (BAZETT): 411 MS
EKG QTC CALCULATION (BAZETT): 486 MS
EKG R AXIS: 0 DEGREES
EKG R AXIS: 87 DEGREES
EKG T AXIS: -161 DEGREES
EKG T AXIS: -168 DEGREES
EKG VENTRICULAR RATE: 72 BPM
EKG VENTRICULAR RATE: 77 BPM
METER GLUCOSE: 275 MG/DL (ref 65–105)
METER GLUCOSE: 308 MG/DL (ref 65–105)
METER GLUCOSE: 319 MG/DL (ref 65–105)
METER GLUCOSE: 335 MG/DL (ref 65–105)
METER GLUCOSE: 372 MG/DL (ref 65–105)
METER GLUCOSE: 394 MG/DL (ref 65–105)
STRESS BASELINE DIAS BP: 57 MMHG
STRESS BASELINE HR: 74 BPM
STRESS BASELINE SYS BP: 116 MMHG
STRESS STAGE RECOVERY 1 BP: NORMAL MMHG
STRESS STAGE RECOVERY 1 DURATION: 1 MIN:SEC
STRESS STAGE RECOVERY 1 HR: 84 BPM
STRESS STAGE RECOVERY 2 BP: NORMAL MMHG
STRESS STAGE RECOVERY 2 COMMENTS: NORMAL
STRESS STAGE RECOVERY 2 DURATION: 10 MIN:SEC
STRESS STAGE RECOVERY 2 HR: 74 BPM
STRESS STAGE RECOVERY 3 BP: NORMAL MMHG
STRESS STAGE RECOVERY 3 COMMENTS: NORMAL
STRESS STAGE RECOVERY 3 DURATION: 28 MIN:SEC
STRESS STAGE RECOVERY 3 HR: 61 BPM
STRESS TARGET HR: 155 BPM
TID: 1.35

## 2023-08-04 PROCEDURE — 2580000003 HC RX 258: Performed by: NURSE PRACTITIONER

## 2023-08-04 PROCEDURE — 3430000000 HC RX DIAGNOSTIC RADIOPHARMACEUTICAL: Performed by: NURSE PRACTITIONER

## 2023-08-04 PROCEDURE — 93017 CV STRESS TEST TRACING ONLY: CPT

## 2023-08-04 PROCEDURE — 82947 ASSAY GLUCOSE BLOOD QUANT: CPT

## 2023-08-04 PROCEDURE — 2060000000 HC ICU INTERMEDIATE R&B

## 2023-08-04 PROCEDURE — 99232 SBSQ HOSP IP/OBS MODERATE 35: CPT | Performed by: FAMILY MEDICINE

## 2023-08-04 PROCEDURE — 6360000002 HC RX W HCPCS: Performed by: INTERNAL MEDICINE

## 2023-08-04 PROCEDURE — 6370000000 HC RX 637 (ALT 250 FOR IP): Performed by: FAMILY MEDICINE

## 2023-08-04 PROCEDURE — 2500000003 HC RX 250 WO HCPCS: Performed by: INTERNAL MEDICINE

## 2023-08-04 PROCEDURE — 99239 HOSP IP/OBS DSCHRG MGMT >30: CPT | Performed by: FAMILY MEDICINE

## 2023-08-04 PROCEDURE — 78452 HT MUSCLE IMAGE SPECT MULT: CPT

## 2023-08-04 PROCEDURE — 93010 ELECTROCARDIOGRAM REPORT: CPT | Performed by: INTERNAL MEDICINE

## 2023-08-04 PROCEDURE — 2580000003 HC RX 258: Performed by: FAMILY MEDICINE

## 2023-08-04 PROCEDURE — A9500 TC99M SESTAMIBI: HCPCS | Performed by: NURSE PRACTITIONER

## 2023-08-04 PROCEDURE — 6360000002 HC RX W HCPCS: Performed by: FAMILY MEDICINE

## 2023-08-04 PROCEDURE — 90935 HEMODIALYSIS ONE EVALUATION: CPT

## 2023-08-04 RX ORDER — METOPROLOL TARTRATE 5 MG/5ML
5 INJECTION INTRAVENOUS EVERY 5 MIN PRN
Status: ACTIVE | OUTPATIENT
Start: 2023-08-04 | End: 2023-08-04

## 2023-08-04 RX ORDER — SODIUM CHLORIDE 0.9 % (FLUSH) 0.9 %
10 SYRINGE (ML) INJECTION PRN
Status: DISCONTINUED | OUTPATIENT
Start: 2023-08-04 | End: 2023-08-08 | Stop reason: HOSPADM

## 2023-08-04 RX ORDER — SODIUM BICARBONATE 650 MG/1
1300 TABLET ORAL 3 TIMES DAILY
Qty: 270 TABLET | Refills: 0 | Status: SHIPPED | OUTPATIENT
Start: 2023-08-04 | End: 2024-08-03

## 2023-08-04 RX ORDER — SODIUM CHLORIDE 0.9 % (FLUSH) 0.9 %
5-40 SYRINGE (ML) INJECTION PRN
Status: ACTIVE | OUTPATIENT
Start: 2023-08-04 | End: 2023-08-04

## 2023-08-04 RX ORDER — REGADENOSON 0.08 MG/ML
0.4 INJECTION, SOLUTION INTRAVENOUS
Status: COMPLETED | OUTPATIENT
Start: 2023-08-04 | End: 2023-08-04

## 2023-08-04 RX ORDER — TETRAKIS(2-METHOXYISOBUTYLISOCYANIDE)COPPER(I) TETRAFLUOROBORATE 1 MG/ML
35 INJECTION, POWDER, LYOPHILIZED, FOR SOLUTION INTRAVENOUS
Status: COMPLETED | OUTPATIENT
Start: 2023-08-04 | End: 2023-08-04

## 2023-08-04 RX ORDER — ALBUTEROL SULFATE 90 UG/1
2 AEROSOL, METERED RESPIRATORY (INHALATION) PRN
Status: ACTIVE | OUTPATIENT
Start: 2023-08-04 | End: 2023-08-04

## 2023-08-04 RX ORDER — TETRAKIS(2-METHOXYISOBUTYLISOCYANIDE)COPPER(I) TETRAFLUOROBORATE 1 MG/ML
17 INJECTION, POWDER, LYOPHILIZED, FOR SOLUTION INTRAVENOUS
Status: COMPLETED | OUTPATIENT
Start: 2023-08-04 | End: 2023-08-04

## 2023-08-04 RX ORDER — ATROPINE SULFATE 0.1 MG/ML
0.5 INJECTION INTRAVENOUS EVERY 5 MIN PRN
Status: ACTIVE | OUTPATIENT
Start: 2023-08-04 | End: 2023-08-04

## 2023-08-04 RX ORDER — NITROGLYCERIN 0.4 MG/1
0.4 TABLET SUBLINGUAL EVERY 5 MIN PRN
Status: DISCONTINUED | OUTPATIENT
Start: 2023-08-04 | End: 2023-08-04

## 2023-08-04 RX ORDER — AMINOPHYLLINE DIHYDRATE 25 MG/ML
50 INJECTION, SOLUTION INTRAVENOUS PRN
Status: ACTIVE | OUTPATIENT
Start: 2023-08-04 | End: 2023-08-04

## 2023-08-04 RX ORDER — SODIUM CHLORIDE 9 MG/ML
500 INJECTION, SOLUTION INTRAVENOUS CONTINUOUS PRN
Status: ACTIVE | OUTPATIENT
Start: 2023-08-04 | End: 2023-08-04

## 2023-08-04 RX ADMIN — BUMETANIDE 2 MG: 1 TABLET ORAL at 12:59

## 2023-08-04 RX ADMIN — Medication 37.2 MILLICURIE: at 13:36

## 2023-08-04 RX ADMIN — BUSPIRONE HYDROCHLORIDE 7.5 MG: 5 TABLET ORAL at 23:43

## 2023-08-04 RX ADMIN — BUSPIRONE HYDROCHLORIDE 7.5 MG: 5 TABLET ORAL at 12:47

## 2023-08-04 RX ADMIN — Medication 17 MILLICURIE: at 10:38

## 2023-08-04 RX ADMIN — ASPIRIN 81 MG 81 MG: 81 TABLET ORAL at 12:49

## 2023-08-04 RX ADMIN — CARVEDILOL 3.12 MG: 3.12 TABLET, FILM COATED ORAL at 23:49

## 2023-08-04 RX ADMIN — REGADENOSON 0.4 MG: 0.08 INJECTION, SOLUTION INTRAVENOUS at 10:35

## 2023-08-04 RX ADMIN — BUMETANIDE 1 MG: 1 TABLET ORAL at 23:52

## 2023-08-04 RX ADMIN — ATORVASTATIN CALCIUM 40 MG: 40 TABLET, FILM COATED ORAL at 23:42

## 2023-08-04 RX ADMIN — BUMETANIDE 1 MG: 1 TABLET ORAL at 12:57

## 2023-08-04 RX ADMIN — DOCUSATE SODIUM 100 MG: 100 CAPSULE, LIQUID FILLED ORAL at 23:42

## 2023-08-04 RX ADMIN — SALINE NASAL SPRAY 1 SPRAY: 1.5 SOLUTION NASAL at 12:51

## 2023-08-04 RX ADMIN — BUMETANIDE 2 MG: 1 TABLET ORAL at 23:45

## 2023-08-04 RX ADMIN — SODIUM BICARBONATE 1300 MG: 650 TABLET ORAL at 23:42

## 2023-08-04 RX ADMIN — INSULIN GLARGINE 62 UNITS: 100 INJECTION, SOLUTION SUBCUTANEOUS at 23:53

## 2023-08-04 RX ADMIN — HEPARIN SODIUM 5000 UNITS: 5000 INJECTION INTRAVENOUS; SUBCUTANEOUS at 23:46

## 2023-08-04 RX ADMIN — INSULIN LISPRO 12 UNITS: 100 INJECTION, SOLUTION INTRAVENOUS; SUBCUTANEOUS at 12:58

## 2023-08-04 RX ADMIN — INSULIN LISPRO 8 UNITS: 100 INJECTION, SOLUTION INTRAVENOUS; SUBCUTANEOUS at 19:22

## 2023-08-04 RX ADMIN — INSULIN GLARGINE 62 UNITS: 100 INJECTION, SOLUTION SUBCUTANEOUS at 12:50

## 2023-08-04 RX ADMIN — SODIUM CHLORIDE, PRESERVATIVE FREE 10 ML: 5 INJECTION INTRAVENOUS at 12:50

## 2023-08-04 RX ADMIN — HEPARIN SODIUM 5000 UNITS: 5000 INJECTION INTRAVENOUS; SUBCUTANEOUS at 06:29

## 2023-08-04 RX ADMIN — DOCUSATE SODIUM 100 MG: 100 CAPSULE, LIQUID FILLED ORAL at 12:52

## 2023-08-04 RX ADMIN — INSULIN LISPRO 15 UNITS: 100 INJECTION, SOLUTION INTRAVENOUS; SUBCUTANEOUS at 12:48

## 2023-08-04 RX ADMIN — HEPARIN SODIUM 5000 UNITS: 5000 INJECTION INTRAVENOUS; SUBCUTANEOUS at 12:48

## 2023-08-04 RX ADMIN — SODIUM BICARBONATE 1300 MG: 650 TABLET ORAL at 12:49

## 2023-08-04 RX ADMIN — ALLOPURINOL 100 MG: 100 TABLET ORAL at 12:50

## 2023-08-04 RX ADMIN — VENLAFAXINE HYDROCHLORIDE 37.5 MG: 37.5 CAPSULE, EXTENDED RELEASE ORAL at 12:49

## 2023-08-04 RX ADMIN — INSULIN LISPRO 4 UNITS: 100 INJECTION, SOLUTION INTRAVENOUS; SUBCUTANEOUS at 23:52

## 2023-08-04 RX ADMIN — GABAPENTIN 100 MG: 100 CAPSULE ORAL at 23:42

## 2023-08-04 RX ADMIN — CARVEDILOL 3.12 MG: 3.12 TABLET, FILM COATED ORAL at 12:52

## 2023-08-04 RX ADMIN — METOPROLOL TARTRATE 5 MG: 1 INJECTION, SOLUTION INTRAVENOUS at 10:57

## 2023-08-04 RX ADMIN — SODIUM CHLORIDE, PRESERVATIVE FREE 10 ML: 5 INJECTION INTRAVENOUS at 13:36

## 2023-08-04 RX ADMIN — TICAGRELOR 90 MG: 90 TABLET ORAL at 12:50

## 2023-08-04 RX ADMIN — TICAGRELOR 90 MG: 90 TABLET ORAL at 23:42

## 2023-08-04 NOTE — CARE COORDINATION
ONGOING DISCHARGE PLAN:    Patient is alert and oriented x4. Spoke with patient regarding discharge plan and patient confirms that plan is still home with [address]. Stress test pending    Home if negative after HD    Will continue to follow for additional discharge needs. If patient is discharged prior to next notation, then this note serves as note for discharge by case management.     Electronically signed by Lizandro Mayberry RN on 8/4/2023 at 1:22 PM

## 2023-08-05 ENCOUNTER — APPOINTMENT (OUTPATIENT)
Dept: CT IMAGING | Age: 63
DRG: 302 | End: 2023-08-05
Payer: COMMERCIAL

## 2023-08-05 ENCOUNTER — APPOINTMENT (OUTPATIENT)
Dept: MRI IMAGING | Age: 63
DRG: 302 | End: 2023-08-05
Payer: COMMERCIAL

## 2023-08-05 PROBLEM — R41.0 EPISODIC CONFUSION: Status: ACTIVE | Noted: 2023-08-05

## 2023-08-05 LAB
ALBUMIN SERPL-MCNC: 3.3 G/DL (ref 3.5–5.2)
ALP SERPL-CCNC: 180 U/L (ref 35–104)
ALT SERPL-CCNC: 59 U/L (ref 5–33)
AMMONIA PLAS-SCNC: 24 UMOL/L (ref 11–51)
ANION GAP SERPL CALCULATED.3IONS-SCNC: 19 MMOL/L (ref 9–17)
AST SERPL-CCNC: 59 U/L
BACTERIA URNS QL MICRO: ABNORMAL
BASOPHILS # BLD: 0 K/UL (ref 0–0.2)
BASOPHILS NFR BLD: 1 % (ref 0–2)
BILIRUB DIRECT SERPL-MCNC: 0.2 MG/DL
BILIRUB INDIRECT SERPL-MCNC: 0.5 MG/DL (ref 0–1)
BILIRUB SERPL-MCNC: 0.7 MG/DL (ref 0.3–1.2)
BILIRUB UR QL STRIP: NEGATIVE
BUN SERPL-MCNC: 29 MG/DL (ref 8–23)
CALCIUM SERPL-MCNC: 9.7 MG/DL (ref 8.6–10.4)
CHLORIDE SERPL-SCNC: 97 MMOL/L (ref 98–107)
CLARITY UR: ABNORMAL
CO2 SERPL-SCNC: 20 MMOL/L (ref 20–31)
COLOR UR: YELLOW
CREAT SERPL-MCNC: 2.7 MG/DL (ref 0.5–0.9)
EOSINOPHIL # BLD: 0.2 K/UL (ref 0–0.4)
EOSINOPHILS RELATIVE PERCENT: 3 % (ref 0–4)
EPI CELLS #/AREA URNS HPF: ABNORMAL /HPF
ERYTHROCYTE [DISTWIDTH] IN BLOOD BY AUTOMATED COUNT: 16.6 % (ref 11.5–14.9)
GFR SERPL CREATININE-BSD FRML MDRD: 19 ML/MIN/1.73M2
GLUCOSE SERPL-MCNC: 395 MG/DL (ref 70–99)
GLUCOSE UR STRIP-MCNC: ABNORMAL MG/DL
HCT VFR BLD AUTO: 31.8 % (ref 36–46)
HGB BLD-MCNC: 10.7 G/DL (ref 12–16)
HGB UR QL STRIP.AUTO: ABNORMAL
KETONES UR STRIP-MCNC: NEGATIVE MG/DL
LEUKOCYTE ESTERASE UR QL STRIP: ABNORMAL
LYMPHOCYTES NFR BLD: 1.1 K/UL (ref 1–4.8)
LYMPHOCYTES RELATIVE PERCENT: 20 % (ref 24–44)
MCH RBC QN AUTO: 33.5 PG (ref 26–34)
MCHC RBC AUTO-ENTMCNC: 33.7 G/DL (ref 31–37)
MCV RBC AUTO: 99.4 FL (ref 80–100)
METER GLUCOSE: 242 MG/DL (ref 65–105)
METER GLUCOSE: 298 MG/DL (ref 65–105)
METER GLUCOSE: 331 MG/DL (ref 65–105)
METER GLUCOSE: 414 MG/DL (ref 65–105)
METER GLUCOSE: 453 MG/DL (ref 65–105)
MONOCYTES NFR BLD: 0.4 K/UL (ref 0.1–1.3)
MONOCYTES NFR BLD: 7 % (ref 1–7)
NEUTROPHILS NFR BLD: 69 % (ref 36–66)
NEUTS SEG NFR BLD: 3.8 K/UL (ref 1.3–9.1)
NITRITE UR QL STRIP: NEGATIVE
PH UR STRIP: 5.5 [PH] (ref 5–8)
PLATELET # BLD AUTO: 158 K/UL (ref 150–450)
PMV BLD AUTO: 8.6 FL (ref 6–12)
POTASSIUM SERPL-SCNC: 3.6 MMOL/L (ref 3.7–5.3)
PROT SERPL-MCNC: 7 G/DL (ref 6.4–8.3)
PROT UR STRIP-MCNC: ABNORMAL MG/DL
RBC # BLD AUTO: 3.2 M/UL (ref 4–5.2)
RBC #/AREA URNS HPF: ABNORMAL /HPF
SODIUM SERPL-SCNC: 136 MMOL/L (ref 135–144)
SP GR UR STRIP: 1.01 (ref 1–1.03)
UROBILINOGEN UR STRIP-ACNC: NORMAL EU/DL (ref 0–1)
WBC #/AREA URNS HPF: ABNORMAL /HPF
WBC OTHER # BLD: 5.4 K/UL (ref 3.5–11)
YEAST URNS QL MICRO: ABNORMAL

## 2023-08-05 PROCEDURE — 87086 URINE CULTURE/COLONY COUNT: CPT

## 2023-08-05 PROCEDURE — 85025 COMPLETE CBC W/AUTO DIFF WBC: CPT

## 2023-08-05 PROCEDURE — 82248 BILIRUBIN DIRECT: CPT

## 2023-08-05 PROCEDURE — 6370000000 HC RX 637 (ALT 250 FOR IP): Performed by: FAMILY MEDICINE

## 2023-08-05 PROCEDURE — 36415 COLL VENOUS BLD VENIPUNCTURE: CPT

## 2023-08-05 PROCEDURE — 82140 ASSAY OF AMMONIA: CPT

## 2023-08-05 PROCEDURE — 70450 CT HEAD/BRAIN W/O DYE: CPT

## 2023-08-05 PROCEDURE — 82947 ASSAY GLUCOSE BLOOD QUANT: CPT

## 2023-08-05 PROCEDURE — 99223 1ST HOSP IP/OBS HIGH 75: CPT | Performed by: PSYCHIATRY & NEUROLOGY

## 2023-08-05 PROCEDURE — 6360000002 HC RX W HCPCS: Performed by: FAMILY MEDICINE

## 2023-08-05 PROCEDURE — 80053 COMPREHEN METABOLIC PANEL: CPT

## 2023-08-05 PROCEDURE — 2060000000 HC ICU INTERMEDIATE R&B

## 2023-08-05 PROCEDURE — 99233 SBSQ HOSP IP/OBS HIGH 50: CPT | Performed by: INTERNAL MEDICINE

## 2023-08-05 PROCEDURE — 81001 URINALYSIS AUTO W/SCOPE: CPT

## 2023-08-05 PROCEDURE — 70551 MRI BRAIN STEM W/O DYE: CPT

## 2023-08-05 PROCEDURE — 2500000003 HC RX 250 WO HCPCS: Performed by: FAMILY MEDICINE

## 2023-08-05 RX ADMIN — SODIUM BICARBONATE 1300 MG: 650 TABLET ORAL at 22:13

## 2023-08-05 RX ADMIN — INSULIN GLARGINE 62 UNITS: 100 INJECTION, SOLUTION SUBCUTANEOUS at 09:33

## 2023-08-05 RX ADMIN — DOCUSATE SODIUM 100 MG: 100 CAPSULE, LIQUID FILLED ORAL at 09:31

## 2023-08-05 RX ADMIN — SODIUM BICARBONATE 1300 MG: 650 TABLET ORAL at 09:30

## 2023-08-05 RX ADMIN — BUSPIRONE HYDROCHLORIDE 7.5 MG: 5 TABLET ORAL at 09:30

## 2023-08-05 RX ADMIN — INSULIN LISPRO 8 UNITS: 100 INJECTION, SOLUTION INTRAVENOUS; SUBCUTANEOUS at 09:34

## 2023-08-05 RX ADMIN — CARVEDILOL 3.12 MG: 3.12 TABLET, FILM COATED ORAL at 22:12

## 2023-08-05 RX ADMIN — GABAPENTIN 100 MG: 100 CAPSULE ORAL at 22:14

## 2023-08-05 RX ADMIN — CARVEDILOL 3.12 MG: 3.12 TABLET, FILM COATED ORAL at 09:32

## 2023-08-05 RX ADMIN — BUMETANIDE 1 MG: 1 TABLET ORAL at 22:14

## 2023-08-05 RX ADMIN — INSULIN GLARGINE 62 UNITS: 100 INJECTION, SOLUTION SUBCUTANEOUS at 22:18

## 2023-08-05 RX ADMIN — LIDOCAINE HYDROCHLORIDE 1000 MG: 10 INJECTION, SOLUTION EPIDURAL; INFILTRATION; INTRACAUDAL; PERINEURAL at 22:26

## 2023-08-05 RX ADMIN — BUMETANIDE 2 MG: 1 TABLET ORAL at 09:41

## 2023-08-05 RX ADMIN — BUSPIRONE HYDROCHLORIDE 7.5 MG: 5 TABLET ORAL at 22:17

## 2023-08-05 RX ADMIN — VENLAFAXINE HYDROCHLORIDE 37.5 MG: 37.5 CAPSULE, EXTENDED RELEASE ORAL at 09:44

## 2023-08-05 RX ADMIN — INSULIN LISPRO 15 UNITS: 100 INJECTION, SOLUTION INTRAVENOUS; SUBCUTANEOUS at 18:11

## 2023-08-05 RX ADMIN — ALLOPURINOL 100 MG: 100 TABLET ORAL at 09:32

## 2023-08-05 RX ADMIN — Medication 3 MG: at 00:06

## 2023-08-05 RX ADMIN — TICAGRELOR 90 MG: 90 TABLET ORAL at 22:14

## 2023-08-05 RX ADMIN — ATORVASTATIN CALCIUM 40 MG: 40 TABLET, FILM COATED ORAL at 22:14

## 2023-08-05 RX ADMIN — INSULIN LISPRO 15 UNITS: 100 INJECTION, SOLUTION INTRAVENOUS; SUBCUTANEOUS at 12:58

## 2023-08-05 RX ADMIN — BUSPIRONE HYDROCHLORIDE 7.5 MG: 5 TABLET ORAL at 13:03

## 2023-08-05 RX ADMIN — DOCUSATE SODIUM 100 MG: 100 CAPSULE, LIQUID FILLED ORAL at 22:12

## 2023-08-05 RX ADMIN — HEPARIN SODIUM 5000 UNITS: 5000 INJECTION INTRAVENOUS; SUBCUTANEOUS at 12:58

## 2023-08-05 RX ADMIN — HEPARIN SODIUM 5000 UNITS: 5000 INJECTION INTRAVENOUS; SUBCUTANEOUS at 22:14

## 2023-08-05 RX ADMIN — BUMETANIDE 1 MG: 1 TABLET ORAL at 09:42

## 2023-08-05 RX ADMIN — TICAGRELOR 90 MG: 90 TABLET ORAL at 09:30

## 2023-08-05 RX ADMIN — ASPIRIN 81 MG 81 MG: 81 TABLET ORAL at 09:31

## 2023-08-05 RX ADMIN — INSULIN LISPRO 15 UNITS: 100 INJECTION, SOLUTION INTRAVENOUS; SUBCUTANEOUS at 09:34

## 2023-08-05 RX ADMIN — Medication 3 MG: at 22:26

## 2023-08-05 RX ADMIN — INSULIN HUMAN 15 UNITS: 100 INJECTION, SOLUTION PARENTERAL at 14:30

## 2023-08-05 NOTE — CARE COORDINATION
ONGOING DISCHARGE PLAN:    Patient is alert and oriented to person and place. Confused at times and her sister states she has Rogelio. \"    Spoke with patient regarding discharge plan and patient confirms that plan is still home with [facility]. Sisters are bedside (including Melany Rothman) who are agreeable to plan. Neuro and psych consulted. Pt goes to HD at [facility] MWF @ 21 [phone]. Will continue to follow for additional discharge needs. If patient is discharged prior to next notation, then this note serves as note for discharge by case management.     Electronically signed by Nasrin Yanez RN on 8/5/2023 at 12:46 PM

## 2023-08-05 NOTE — FLOWSHEET NOTE
Dr Ramin Nick perfect serve messaged: repeat bs after 15 units of regular without sliding scale went from 453 to 414. Please address.

## 2023-08-05 NOTE — FLOWSHEET NOTE
Dr Valerie Riggs perfect served: bs for lunch is 453 and sliding scale high dose algorithm coverage states  \"349 and over 16 units and notify physician. \" she is also been getting scheduled 15 units basal with meals and lantus 62 units bid. she is due for 31 units with her meal. please address.

## 2023-08-05 NOTE — FLOWSHEET NOTE
Dr Boland Going perfect serve messaged: I see you put in rocephin iv orders. she does not have iv access since last night after her potential dc. her family states she is a hard poke and need US iv. Is there any chance to change to po or do you want to keep the rocephin? ? See new orders.

## 2023-08-05 NOTE — FLOWSHEET NOTE
Latest Reference Range & Units 08/05/23 15:47   Color, UA Yellow  Yellow   Turbidity UA Clear  Turbid ! Glucose, UA NEGATIVE mg/dL MOD ! Bilirubin, Urine NEGATIVE  NEGATIVE   Ketones, Urine NEGATIVE mg/dL NEGATIVE   Specific Gravity, UA 1.000 - 1.030  1.013   pH, UA 5.0 - 8.0  5.5   Protein, UA NEGATIVE mg/dL 2+ ! Urobilinogen, Urine 0.0 - 1.0 EU/dL Normal   Nitrite, Urine NEGATIVE  NEGATIVE   Leukocyte Esterase, Urine NEGATIVE  LARGE ! WBC, UA /HPF TOO NUMEROUS TO COUNT   RBC, UA /HPF 3 to 5   Epithelial Cells, UA /HPF 10 TO 20   Bacteria, UA None  MANY ! Yeast, Urine None  MODERATE ! Urine Hgb NEGATIVE  SMALL !    URINALYSIS WITH REFLEX TO CULTURE  Rpt !   !: Data is abnormal  Rpt: View report in Results Review for more information    Dr Uziel mckeon served above results

## 2023-08-06 LAB
METER GLUCOSE: 179 MG/DL (ref 65–105)
METER GLUCOSE: 337 MG/DL (ref 65–105)
METER GLUCOSE: 373 MG/DL (ref 65–105)
METER GLUCOSE: 386 MG/DL (ref 65–105)
MICROORGANISM SPEC CULT: NORMAL
SPECIMEN DESCRIPTION: NORMAL

## 2023-08-06 PROCEDURE — 6370000000 HC RX 637 (ALT 250 FOR IP): Performed by: FAMILY MEDICINE

## 2023-08-06 PROCEDURE — 99231 SBSQ HOSP IP/OBS SF/LOW 25: CPT | Performed by: PSYCHIATRY & NEUROLOGY

## 2023-08-06 PROCEDURE — 2060000000 HC ICU INTERMEDIATE R&B

## 2023-08-06 PROCEDURE — 2500000003 HC RX 250 WO HCPCS: Performed by: FAMILY MEDICINE

## 2023-08-06 PROCEDURE — 6360000002 HC RX W HCPCS: Performed by: FAMILY MEDICINE

## 2023-08-06 PROCEDURE — 82947 ASSAY GLUCOSE BLOOD QUANT: CPT

## 2023-08-06 RX ADMIN — TICAGRELOR 90 MG: 90 TABLET ORAL at 21:41

## 2023-08-06 RX ADMIN — BUSPIRONE HYDROCHLORIDE 7.5 MG: 5 TABLET ORAL at 08:58

## 2023-08-06 RX ADMIN — HEPARIN SODIUM 5000 UNITS: 5000 INJECTION INTRAVENOUS; SUBCUTANEOUS at 06:59

## 2023-08-06 RX ADMIN — INSULIN LISPRO 15 UNITS: 100 INJECTION, SOLUTION INTRAVENOUS; SUBCUTANEOUS at 11:22

## 2023-08-06 RX ADMIN — INSULIN LISPRO 15 UNITS: 100 INJECTION, SOLUTION INTRAVENOUS; SUBCUTANEOUS at 16:59

## 2023-08-06 RX ADMIN — TICAGRELOR 90 MG: 90 TABLET ORAL at 08:57

## 2023-08-06 RX ADMIN — INSULIN GLARGINE 62 UNITS: 100 INJECTION, SOLUTION SUBCUTANEOUS at 09:00

## 2023-08-06 RX ADMIN — VENLAFAXINE HYDROCHLORIDE 37.5 MG: 37.5 CAPSULE, EXTENDED RELEASE ORAL at 09:01

## 2023-08-06 RX ADMIN — DOCUSATE SODIUM 100 MG: 100 CAPSULE, LIQUID FILLED ORAL at 08:59

## 2023-08-06 RX ADMIN — DOCUSATE SODIUM 100 MG: 100 CAPSULE, LIQUID FILLED ORAL at 21:41

## 2023-08-06 RX ADMIN — CARVEDILOL 3.12 MG: 3.12 TABLET, FILM COATED ORAL at 21:41

## 2023-08-06 RX ADMIN — INSULIN LISPRO 15 UNITS: 100 INJECTION, SOLUTION INTRAVENOUS; SUBCUTANEOUS at 08:59

## 2023-08-06 RX ADMIN — BUSPIRONE HYDROCHLORIDE 7.5 MG: 5 TABLET ORAL at 21:41

## 2023-08-06 RX ADMIN — ALLOPURINOL 100 MG: 100 TABLET ORAL at 08:59

## 2023-08-06 RX ADMIN — SODIUM BICARBONATE 1300 MG: 650 TABLET ORAL at 21:41

## 2023-08-06 RX ADMIN — ASPIRIN 81 MG 81 MG: 81 TABLET ORAL at 08:59

## 2023-08-06 RX ADMIN — CARVEDILOL 3.12 MG: 3.12 TABLET, FILM COATED ORAL at 08:59

## 2023-08-06 RX ADMIN — ATORVASTATIN CALCIUM 40 MG: 40 TABLET, FILM COATED ORAL at 21:41

## 2023-08-06 RX ADMIN — LIDOCAINE HYDROCHLORIDE 1000 MG: 10 INJECTION, SOLUTION EPIDURAL; INFILTRATION; INTRACAUDAL; PERINEURAL at 21:42

## 2023-08-06 RX ADMIN — INSULIN GLARGINE 62 UNITS: 100 INJECTION, SOLUTION SUBCUTANEOUS at 21:41

## 2023-08-06 RX ADMIN — HEPARIN SODIUM 5000 UNITS: 5000 INJECTION INTRAVENOUS; SUBCUTANEOUS at 14:00

## 2023-08-06 RX ADMIN — HEPARIN SODIUM 5000 UNITS: 5000 INJECTION INTRAVENOUS; SUBCUTANEOUS at 21:41

## 2023-08-06 RX ADMIN — ACETAMINOPHEN 650 MG: 325 TABLET ORAL at 09:08

## 2023-08-06 RX ADMIN — Medication 3 MG: at 21:46

## 2023-08-06 RX ADMIN — SODIUM BICARBONATE 1300 MG: 650 TABLET ORAL at 08:59

## 2023-08-06 RX ADMIN — BUSPIRONE HYDROCHLORIDE 7.5 MG: 5 TABLET ORAL at 14:00

## 2023-08-06 RX ADMIN — BUMETANIDE 1 MG: 1 TABLET ORAL at 21:41

## 2023-08-06 RX ADMIN — BUMETANIDE 1 MG: 1 TABLET ORAL at 08:59

## 2023-08-06 RX ADMIN — GABAPENTIN 100 MG: 100 CAPSULE ORAL at 21:42

## 2023-08-06 RX ADMIN — INSULIN LISPRO 4 UNITS: 100 INJECTION, SOLUTION INTRAVENOUS; SUBCUTANEOUS at 21:40

## 2023-08-06 ASSESSMENT — PAIN SCALES - GENERAL: PAINLEVEL_OUTOF10: 6

## 2023-08-06 ASSESSMENT — PAIN DESCRIPTION - LOCATION: LOCATION: BUTTOCKS

## 2023-08-06 ASSESSMENT — PAIN DESCRIPTION - DESCRIPTORS: DESCRIPTORS: ACHING

## 2023-08-06 NOTE — FLOWSHEET NOTE
Dr Boucher Falling perfect serve messaged: lunch bs is 386. There is no sliding scale ordered, only basal 15 with meals. she was on a high sliding scale yesterday until lunch and was in the 400s. please address.

## 2023-08-07 LAB
METER GLUCOSE: 146 MG/DL (ref 65–105)
METER GLUCOSE: 288 MG/DL (ref 65–105)
METER GLUCOSE: 290 MG/DL (ref 65–105)
METER GLUCOSE: 350 MG/DL (ref 65–105)
METER GLUCOSE: 354 MG/DL (ref 65–105)

## 2023-08-07 PROCEDURE — 6360000002 HC RX W HCPCS: Performed by: FAMILY MEDICINE

## 2023-08-07 PROCEDURE — 6370000000 HC RX 637 (ALT 250 FOR IP): Performed by: FAMILY MEDICINE

## 2023-08-07 PROCEDURE — 82947 ASSAY GLUCOSE BLOOD QUANT: CPT

## 2023-08-07 PROCEDURE — 90935 HEMODIALYSIS ONE EVALUATION: CPT

## 2023-08-07 PROCEDURE — 97166 OT EVAL MOD COMPLEX 45 MIN: CPT

## 2023-08-07 PROCEDURE — 99232 SBSQ HOSP IP/OBS MODERATE 35: CPT | Performed by: STUDENT IN AN ORGANIZED HEALTH CARE EDUCATION/TRAINING PROGRAM

## 2023-08-07 PROCEDURE — 2060000000 HC ICU INTERMEDIATE R&B

## 2023-08-07 PROCEDURE — 2500000003 HC RX 250 WO HCPCS: Performed by: FAMILY MEDICINE

## 2023-08-07 PROCEDURE — 97162 PT EVAL MOD COMPLEX 30 MIN: CPT

## 2023-08-07 RX ADMIN — HEPARIN SODIUM 5000 UNITS: 5000 INJECTION INTRAVENOUS; SUBCUTANEOUS at 22:25

## 2023-08-07 RX ADMIN — SODIUM BICARBONATE 1300 MG: 650 TABLET ORAL at 10:08

## 2023-08-07 RX ADMIN — BUSPIRONE HYDROCHLORIDE 7.5 MG: 5 TABLET ORAL at 15:15

## 2023-08-07 RX ADMIN — ACETAMINOPHEN 650 MG: 325 TABLET ORAL at 18:25

## 2023-08-07 RX ADMIN — ATORVASTATIN CALCIUM 40 MG: 40 TABLET, FILM COATED ORAL at 21:23

## 2023-08-07 RX ADMIN — BUSPIRONE HYDROCHLORIDE 7.5 MG: 5 TABLET ORAL at 10:08

## 2023-08-07 RX ADMIN — CARVEDILOL 3.12 MG: 3.12 TABLET, FILM COATED ORAL at 15:14

## 2023-08-07 RX ADMIN — BUSPIRONE HYDROCHLORIDE 7.5 MG: 5 TABLET ORAL at 21:23

## 2023-08-07 RX ADMIN — TICAGRELOR 90 MG: 90 TABLET ORAL at 10:09

## 2023-08-07 RX ADMIN — INSULIN LISPRO 4 UNITS: 100 INJECTION, SOLUTION INTRAVENOUS; SUBCUTANEOUS at 21:27

## 2023-08-07 RX ADMIN — INSULIN LISPRO 15 UNITS: 100 INJECTION, SOLUTION INTRAVENOUS; SUBCUTANEOUS at 17:34

## 2023-08-07 RX ADMIN — Medication 3 MG: at 21:23

## 2023-08-07 RX ADMIN — DOCUSATE SODIUM 100 MG: 100 CAPSULE, LIQUID FILLED ORAL at 10:08

## 2023-08-07 RX ADMIN — INSULIN LISPRO 15 UNITS: 100 INJECTION, SOLUTION INTRAVENOUS; SUBCUTANEOUS at 10:09

## 2023-08-07 RX ADMIN — ASPIRIN 81 MG 81 MG: 81 TABLET ORAL at 15:15

## 2023-08-07 RX ADMIN — HEPARIN SODIUM 5000 UNITS: 5000 INJECTION INTRAVENOUS; SUBCUTANEOUS at 05:05

## 2023-08-07 RX ADMIN — BUMETANIDE 1 MG: 1 TABLET ORAL at 21:23

## 2023-08-07 RX ADMIN — SODIUM BICARBONATE 1300 MG: 650 TABLET ORAL at 21:23

## 2023-08-07 RX ADMIN — TICAGRELOR 90 MG: 90 TABLET ORAL at 21:23

## 2023-08-07 RX ADMIN — DOCUSATE SODIUM 100 MG: 100 CAPSULE, LIQUID FILLED ORAL at 21:24

## 2023-08-07 RX ADMIN — LIDOCAINE HYDROCHLORIDE 1000 MG: 10 INJECTION, SOLUTION EPIDURAL; INFILTRATION; INTRACAUDAL; PERINEURAL at 22:25

## 2023-08-07 RX ADMIN — INSULIN GLARGINE 62 UNITS: 100 INJECTION, SOLUTION SUBCUTANEOUS at 21:27

## 2023-08-07 RX ADMIN — CARVEDILOL 3.12 MG: 3.12 TABLET, FILM COATED ORAL at 21:23

## 2023-08-07 RX ADMIN — GABAPENTIN 100 MG: 100 CAPSULE ORAL at 21:23

## 2023-08-07 RX ADMIN — VENLAFAXINE HYDROCHLORIDE 37.5 MG: 37.5 CAPSULE, EXTENDED RELEASE ORAL at 15:21

## 2023-08-07 RX ADMIN — HEPARIN SODIUM 5000 UNITS: 5000 INJECTION INTRAVENOUS; SUBCUTANEOUS at 15:15

## 2023-08-07 RX ADMIN — ALLOPURINOL 100 MG: 100 TABLET ORAL at 10:08

## 2023-08-07 RX ADMIN — BUMETANIDE 1 MG: 1 TABLET ORAL at 15:15

## 2023-08-07 RX ADMIN — INSULIN GLARGINE 62 UNITS: 100 INJECTION, SOLUTION SUBCUTANEOUS at 10:09

## 2023-08-07 ASSESSMENT — PAIN SCALES - GENERAL: PAINLEVEL_OUTOF10: 3

## 2023-08-07 ASSESSMENT — ENCOUNTER SYMPTOMS
COUGH: 0
VOMITING: 0
ABDOMINAL PAIN: 0
NAUSEA: 0
CONSTIPATION: 0
DIARRHEA: 0
WHEEZING: 0

## 2023-08-07 NOTE — CONSULTS
NEUROLOGY INPATIENT CONSULT NOTE    8/5/2023         Mario Ayoub is a  70 y.o. female admitted on 7/31/2023 with  Unstable angina (HCC) [I20.0]  Hypokalemia [E87.6]  Acute electrocardiogram changes [R94.31]  Chest pain, unspecified type [R07.9]    Reason for consult: selective amnesia  History is obtained mostly from the patient's sister and patient and the medical record and from the caregivers. Chart is reviewed and patient is examined. Briefly, this is a  70 y.o. female with hx of HTN, HLD, CAD, DM, CKD on dialysis was admitted on 7/31/2023 with c/o chest pain radiating to right upper extremity. Evaluated by cardiology and felt elevated troponins likely type II; stress test with large inferior wall infarct. Patient was able to be discharged last night and patient became extremely anxious and did not know where she was or why she was here. This morning she had again similar episode. Patient had history of episodes of confusion and was noted to be hypotensive with dialysis. Patient had acute episode of disorientation last night for which neurology is consulted. Patient stated that she found out today about her \"dad dying in January and mom dying in April this year\". Patient is \"not sure about living by herself in condo\". Patient is worried about it. She is about to be discharged and that made her extremely agitated and anxious. Patient also said that she has been having episodes of anxiety and she is not sure whether she is getting any medications that help her anxiety. She is requesting to make sure that she is getting \"anxiety medications\". Patient also stated that she did not know where she was staying before hospitalized but during this encounter she stated that she was living in condo\". There were inconsistencies in narration of her story. She states \"I dont' know where I am\" but she is able to answer questions regarding orientation.  She is able to tell me that she is in hospital,
100 MG capsule, Take 1 capsule by mouth daily for 30 days. Intended supply: 30 days (Patient taking differently: Take 1 capsule by mouth daily.)  venlafaxine (EFFEXOR XR) 37.5 MG extended release capsule, Take 1 capsule by mouth daily (with breakfast)  bumetanide (BUMEX) 2 MG tablet, Take 1 tablet by mouth 2 times daily Indications: with 1 mg tablet to make 3 mg dose  bumetanide (BUMEX) 1 MG tablet, Take 1 tablet by mouth 2 times daily Indications: with 2 mg tablet to make 3 mg dose  cyclobenzaprine (FLEXERIL) 5 MG tablet, Take 1 tablet by mouth 3 times daily as needed for Muscle spasms  midodrine (PROAMATINE) 5 MG tablet, Take 1 tablet by mouth as needed (up to 2 tablets as needed for SBP <100 during dialysis)  Continuous Blood Gluc Sensor (DEXCOM G7 SENSOR) MISC, 1 each by Does not apply route every 14 days  Continuous Blood Gluc Transmit (DEXCOM G6 TRANSMITTER) MISC, 1 each by Does not apply route in the morning, at noon, in the evening, and at bedtime  busPIRone (BUSPAR) 7.5 MG tablet, Take 1 tablet by mouth 3 times daily  sodium chloride (OCEAN, BABY AYR) 0.65 % nasal spray, 1 spray by Nasal route every 4 hours as needed for Congestion  atorvastatin (LIPITOR) 80 MG tablet, Take 1 tablet by mouth nightly  Melatonin 10 MG TABS, Take 10 mg by mouth nightly as needed (for sleep) Indications: Trouble Sleeping  Continuous Blood Gluc Sensor (DEXCOM G6 SENSOR) MISC, 1 each by Does not apply route 4 times daily  Continuous Blood Gluc  (DEXCOM G6 ) KODY, 1 each by Does not apply route 4 times daily  allopurinol (ZYLOPRIM) 100 MG tablet, Take 1 tablet by mouth daily  Insulin Pen Needle (EASY TOUCH PEN NEEDLES) 29G X 12MM MISC, 5 each by Does not apply route daily  Blood Glucose Monitoring Suppl (TRUE METRIX GO GLUCOSE METER) w/Device KIT, 1 each by Does not apply route 4 times daily  blood glucose monitor strips, Test 3 times a day & as needed for symptoms of irregular blood glucose.  Dispense sufficient
chest pain    ESRD on hemodialysis Monday Wednesday Friday via left arm AV fistula  Essential hypertension  Mineral bone disease. Anemia of chronic kidney disease       Plan:  Hemodialysis today as per orders  Continue hemodialysis Monday Wednesday Friday    Thank you for the consultation.       Electronically signed by Aida Cook MD on 8/2/2023 at 11:10 AM
failure (HCC)    Nephrotic range proteinuria    Acute respiratory failure with hypoxia (HCC)    Syncope and collapse    Subacute cough    Acute on chronic heart failure, unspecified heart failure type (HCC)    Diarrhea of presumed infectious origin    Epistaxis    Chronic respiratory failure with hypoxia (HCC)    Atypical chest pain    Hemodialysis catheter dysfunction ([address])    Dialysis AV fistula malfunction (HCC)    ESRD (end stage renal disease) ([address])    ESRD on dialysis ([address])    Hypokalemia    ERICK (obstructive sleep apnea)    AMS (altered mental status)    Unstable angina ([address])     CARDIAC CATHETERIZATION ( 2/14/23)     Conclusions:     Multivessel coronary artery disease. Patent LIMA-LAD, SVG-D and SVG-OM1. Severe diffuse disease post anastomosis of SVG-OM is known  and not amenable to PCI. Patent proximal RCA stent with new mid severe stenosis. RPDA has diffuse severe stenosis but small for  PCI. Successful PTCA/FADIA of mid RCA. Limited Echo 3/23:  Limited study ordered to evaluate ejection fraction, pericardial effusion. Normal left ventricle size, wall thickness and function with an estimated EF  55-60%. No segmental wall motion abnormalities seen. No significant pericardial effusion is seen. IMPRESSION:      Recurrent episodes of chest pain with no new EKG changes and elevated cardiac enzymes secondary to underlying CKD. History of CAD with prior CABG and multiple PCI's last cath was 02/2023 as above with PCI to the RPDA  Preserved LV systolic function  Chronic diastolic heart failure  Hypertension  Hyperlipidemia  End-stage renal disease on hemodialysis  Obstructive sleep apnea  Diabetes mellitus  Obesity      RECOMMENDATIONS:    Continue aspirin, beta-blockers, Brilinta, and statins. Cannot tolerate Ranexa or long-acting nitrates due to angioedema. May discontinue heparin drip  We will plan nuclear stress test in a.m. to evaluate for ischemia.     Discussed with patient and

## 2023-08-07 NOTE — CARE COORDINATION
Writer was notified by Loren Cruz, from [address] [place] [address], that pt. Is denied for admission, R/T Staffing issues.

## 2023-08-07 NOTE — CARE COORDINATION
ONGOING DISCHARGE PLAN:    Patient is alert and oriented x4. Spoke with patient regarding discharge plan and patient confirms that plan is to go to SNF. Pt is from Home alone & is current w/ [facility]. PT/OT on board, awaiting Rec. Per Pt.she would like referrals sent to :     1) [address] [place] [address]    2) [facility]    Awaiting on acceptance. Neuro/Psych has been consulted. Nephro on board. Pt. Getting Dialysis today. Follows at [facility] at 10:15 AM.     Will continue to follow for additional discharge needs. If patient is discharged prior to next notation, then this note serves as note for discharge by case management.     Electronically signed by Segundo May RN on 8/7/2023 at 12:12 PM

## 2023-08-08 VITALS
HEIGHT: 65 IN | OXYGEN SATURATION: 97 % | WEIGHT: 231.7 LBS | BODY MASS INDEX: 38.6 KG/M2 | TEMPERATURE: 98.1 F | DIASTOLIC BLOOD PRESSURE: 42 MMHG | RESPIRATION RATE: 18 BRPM | SYSTOLIC BLOOD PRESSURE: 110 MMHG | HEART RATE: 65 BPM

## 2023-08-08 LAB
ANION GAP SERPL CALCULATED.3IONS-SCNC: 15 MMOL/L (ref 9–17)
BUN SERPL-MCNC: 37 MG/DL (ref 8–23)
CALCIUM SERPL-MCNC: 9.7 MG/DL (ref 8.6–10.4)
CHLORIDE SERPL-SCNC: 94 MMOL/L (ref 98–107)
CO2 SERPL-SCNC: 26 MMOL/L (ref 20–31)
CREAT SERPL-MCNC: 3.5 MG/DL (ref 0.5–0.9)
GFR SERPL CREATININE-BSD FRML MDRD: 14 ML/MIN/1.73M2
GLUCOSE SERPL-MCNC: 430 MG/DL (ref 70–99)
METER GLUCOSE: 356 MG/DL (ref 65–105)
METER GLUCOSE: 381 MG/DL (ref 65–105)
METER GLUCOSE: 396 MG/DL (ref 65–105)
METER GLUCOSE: 431 MG/DL (ref 65–105)
METER GLUCOSE: 441 MG/DL (ref 65–105)
METER GLUCOSE: 472 MG/DL (ref 65–105)
METER GLUCOSE: 497 MG/DL (ref 65–105)
POTASSIUM SERPL-SCNC: 4.3 MMOL/L (ref 3.7–5.3)
SODIUM SERPL-SCNC: 135 MMOL/L (ref 135–144)

## 2023-08-08 PROCEDURE — 6360000002 HC RX W HCPCS: Performed by: FAMILY MEDICINE

## 2023-08-08 PROCEDURE — 99239 HOSP IP/OBS DSCHRG MGMT >30: CPT | Performed by: STUDENT IN AN ORGANIZED HEALTH CARE EDUCATION/TRAINING PROGRAM

## 2023-08-08 PROCEDURE — 6370000000 HC RX 637 (ALT 250 FOR IP): Performed by: FAMILY MEDICINE

## 2023-08-08 PROCEDURE — 82947 ASSAY GLUCOSE BLOOD QUANT: CPT

## 2023-08-08 PROCEDURE — 97110 THERAPEUTIC EXERCISES: CPT

## 2023-08-08 PROCEDURE — 97116 GAIT TRAINING THERAPY: CPT

## 2023-08-08 PROCEDURE — 99213 OFFICE O/P EST LOW 20 MIN: CPT

## 2023-08-08 PROCEDURE — 80048 BASIC METABOLIC PNL TOTAL CA: CPT

## 2023-08-08 PROCEDURE — 36415 COLL VENOUS BLD VENIPUNCTURE: CPT

## 2023-08-08 PROCEDURE — 6370000000 HC RX 637 (ALT 250 FOR IP): Performed by: STUDENT IN AN ORGANIZED HEALTH CARE EDUCATION/TRAINING PROGRAM

## 2023-08-08 RX ORDER — VENLAFAXINE HYDROCHLORIDE 75 MG/1
75 CAPSULE, EXTENDED RELEASE ORAL
Qty: 90 CAPSULE | Refills: 0 | Status: SHIPPED | OUTPATIENT
Start: 2023-08-09

## 2023-08-08 RX ORDER — INSULIN LISPRO 100 [IU]/ML
20 INJECTION, SOLUTION INTRAVENOUS; SUBCUTANEOUS
Status: DISCONTINUED | OUTPATIENT
Start: 2023-08-08 | End: 2023-08-08 | Stop reason: HOSPADM

## 2023-08-08 RX ORDER — BUSPIRONE HYDROCHLORIDE 10 MG/1
10 TABLET ORAL 3 TIMES DAILY
Qty: 90 TABLET | Refills: 0 | Status: SHIPPED | OUTPATIENT
Start: 2023-08-08

## 2023-08-08 RX ORDER — VENLAFAXINE HYDROCHLORIDE 75 MG/1
75 CAPSULE, EXTENDED RELEASE ORAL
Status: DISCONTINUED | OUTPATIENT
Start: 2023-08-09 | End: 2023-08-08 | Stop reason: HOSPADM

## 2023-08-08 RX ORDER — CARBOXYMETHYLCELLULOSE SODIUM 5 MG/ML
1 SOLUTION/ DROPS OPHTHALMIC 3 TIMES DAILY
Status: DISCONTINUED | OUTPATIENT
Start: 2023-08-08 | End: 2023-08-08 | Stop reason: HOSPADM

## 2023-08-08 RX ORDER — INSULIN LISPRO 100 [IU]/ML
0-4 INJECTION, SOLUTION INTRAVENOUS; SUBCUTANEOUS NIGHTLY
Status: DISCONTINUED | OUTPATIENT
Start: 2023-08-08 | End: 2023-08-08 | Stop reason: HOSPADM

## 2023-08-08 RX ORDER — INSULIN ASPART 100 [IU]/ML
17 INJECTION, SOLUTION INTRAVENOUS; SUBCUTANEOUS
Qty: 8 ADJUSTABLE DOSE PRE-FILLED PEN SYRINGE | Refills: 3 | Status: SHIPPED
Start: 2023-08-08 | End: 2023-09-07

## 2023-08-08 RX ORDER — INSULIN GLARGINE 100 [IU]/ML
65 INJECTION, SOLUTION SUBCUTANEOUS 2 TIMES DAILY
Qty: 5 ADJUSTABLE DOSE PRE-FILLED PEN SYRINGE | Refills: 5 | Status: SHIPPED
Start: 2023-08-08 | End: 2023-09-07

## 2023-08-08 RX ORDER — INSULIN GLARGINE 100 [IU]/ML
65 INJECTION, SOLUTION SUBCUTANEOUS 2 TIMES DAILY
Status: DISCONTINUED | OUTPATIENT
Start: 2023-08-08 | End: 2023-08-08 | Stop reason: HOSPADM

## 2023-08-08 RX ORDER — INSULIN LISPRO 100 [IU]/ML
0-16 INJECTION, SOLUTION INTRAVENOUS; SUBCUTANEOUS
Status: DISCONTINUED | OUTPATIENT
Start: 2023-08-08 | End: 2023-08-08 | Stop reason: HOSPADM

## 2023-08-08 RX ORDER — BUSPIRONE HYDROCHLORIDE 10 MG/1
10 TABLET ORAL 3 TIMES DAILY
Status: DISCONTINUED | OUTPATIENT
Start: 2023-08-08 | End: 2023-08-08 | Stop reason: HOSPADM

## 2023-08-08 RX ADMIN — ACETAMINOPHEN 650 MG: 325 TABLET ORAL at 00:28

## 2023-08-08 RX ADMIN — CARBOXYMETHYLCELLULOSE SODIUM 1 DROP: 5 SOLUTION/ DROPS OPHTHALMIC at 16:28

## 2023-08-08 RX ADMIN — INSULIN LISPRO 16 UNITS: 100 INJECTION, SOLUTION INTRAVENOUS; SUBCUTANEOUS at 17:02

## 2023-08-08 RX ADMIN — HEPARIN SODIUM 5000 UNITS: 5000 INJECTION INTRAVENOUS; SUBCUTANEOUS at 13:47

## 2023-08-08 RX ADMIN — INSULIN LISPRO 15 UNITS: 100 INJECTION, SOLUTION INTRAVENOUS; SUBCUTANEOUS at 12:06

## 2023-08-08 RX ADMIN — INSULIN LISPRO 20 UNITS: 100 INJECTION, SOLUTION INTRAVENOUS; SUBCUTANEOUS at 17:03

## 2023-08-08 RX ADMIN — HEPARIN SODIUM 5000 UNITS: 5000 INJECTION INTRAVENOUS; SUBCUTANEOUS at 06:35

## 2023-08-08 RX ADMIN — BUSPIRONE HYDROCHLORIDE 10 MG: 5 TABLET ORAL at 13:47

## 2023-08-08 RX ADMIN — BUMETANIDE 1 MG: 1 TABLET ORAL at 08:33

## 2023-08-08 RX ADMIN — VENLAFAXINE HYDROCHLORIDE 37.5 MG: 37.5 CAPSULE, EXTENDED RELEASE ORAL at 08:32

## 2023-08-08 RX ADMIN — TICAGRELOR 90 MG: 90 TABLET ORAL at 08:32

## 2023-08-08 RX ADMIN — ASPIRIN 81 MG 81 MG: 81 TABLET ORAL at 08:33

## 2023-08-08 RX ADMIN — INSULIN LISPRO 15 UNITS: 100 INJECTION, SOLUTION INTRAVENOUS; SUBCUTANEOUS at 08:31

## 2023-08-08 RX ADMIN — INSULIN LISPRO 16 UNITS: 100 INJECTION, SOLUTION INTRAVENOUS; SUBCUTANEOUS at 12:07

## 2023-08-08 RX ADMIN — ALLOPURINOL 100 MG: 100 TABLET ORAL at 08:33

## 2023-08-08 RX ADMIN — DOCUSATE SODIUM 100 MG: 100 CAPSULE, LIQUID FILLED ORAL at 08:32

## 2023-08-08 RX ADMIN — INSULIN GLARGINE 62 UNITS: 100 INJECTION, SOLUTION SUBCUTANEOUS at 08:32

## 2023-08-08 RX ADMIN — BUSPIRONE HYDROCHLORIDE 7.5 MG: 5 TABLET ORAL at 08:32

## 2023-08-08 RX ADMIN — SODIUM BICARBONATE 1300 MG: 650 TABLET ORAL at 08:33

## 2023-08-08 ASSESSMENT — PAIN DESCRIPTION - ORIENTATION: ORIENTATION: LEFT;RIGHT

## 2023-08-08 ASSESSMENT — PAIN DESCRIPTION - DESCRIPTORS: DESCRIPTORS: THROBBING

## 2023-08-08 ASSESSMENT — PAIN DESCRIPTION - LOCATION: LOCATION: FOOT;LEG;HAND

## 2023-08-08 ASSESSMENT — PAIN SCALES - GENERAL: PAINLEVEL_OUTOF10: 7

## 2023-08-08 NOTE — CARE COORDINATION
Continuity of Care Form    Patient Name: Faina Heart   :  1960  MRN:  [id number]    Admit date:  2023  Discharge date:  23    Code Status Order: Full Code   Advance Directives:     Admitting Physician:  Narciso Bhatia MD  PCP: Sonja Vegas, APRN - CNP    Discharging Nurse: [facility] [employer] [facility] Unit/Room#: 2114/2114-01  Discharging Unit Phone Number: [phone]    Emergency Contact:   Extended Emergency Contact Information  Primary Emergency Contact: Hailey Guido  Home Phone: [phone]  Relation: Brother/Sister  Secondary Emergency Contact: [address] [facility],[address] Phone: [phone]  Relation: Brother/Sister    Past Surgical History:  Past Surgical History:   Procedure Laterality Date    ANKLE FRACTURE SURGERY      AV FISTULA CREATION  2021    BACK SURGERY      BREAST SURGERY      CARDIAC CATHETERIZATION      MVD  /  CT CONSULT    CARDIAC SURGERY      CARPAL TUNNEL RELEASE      CHOLECYSTECTOMY, OPEN N/A     COLONOSCOPY      CORONARY ANGIOPLASTY WITH STENT PLACEMENT  2023    PTCA / FADIA RCA    CORONARY ANGIOPLASTY WITH STENT PLACEMENT  2019    STENT X1  [facility] GRAFT  2005    [address] [facility] Right 2023    CVC CATHETER REPLACEMENT right chest performed by Donovan Palacio MD at [facility] Left 2021    LEFT AV FISTULA CREATION UPPER EXTREMITY performed by Estrada Bernard MD at [facility] 2023    LEFT AV FISTULA REVISION WITH SUPERFICIALIZATION performed by Donovan Palacio MD at [address] [facility]      IR TUNNELED CATHETER PLACEMENT GREATER THAN 5 YEARS  2021    IR TUNNELED CATHETER PLACEMENT GREATER THAN 5 YEARS 2021 [facility]    IR TUNNELED CATHETER PLACEMENT GREATER THAN 5 YEARS  2023    IR TUNNELED CATHETER PLACEMENT GREATER

## 2023-08-08 NOTE — CARE COORDINATION
ONGOING DISCHARGE PLAN:    Patient is alert, up in chair. Pt. Did NOT know her last Name & did NOT know how to sign it. Spoke with patient regarding discharge plan and patient confirms that plan is to meet w/ Jh Weston, from [facility] of  for possible admission. Pt. Did stay she wanted to go home w/ [facility], however, she appears forgetful, lives alone & per PT was only walking 35 FT. PT/OT are Rec SNF. Per Rogers from [facility], they only have Semi-Private rooms, which pt. Is OK with & they most likely can get her to her Dialysis, for she can go in a WC. Pt. Wants to call her Sister & get her input on SNF admission, otherwise, she will go home w/ [facility]. Writer will follow after Claudette Perales sees. Will continue to follow for additional discharge needs. If patient is discharged prior to next notation, then this note serves as note for discharge by case management.     Electronically signed by Jim Aceves RN on 8/8/2023 at 11:23 AM

## 2023-08-08 NOTE — CARE COORDINATION
Continuity of Care Form    Patient Name: Mar Roe   :  1960  MRN:  [id number]    Admit date:  2023  Discharge date:  23    Code Status Order: Full Code   Advance Directives:     Admitting Physician:  Eufemia Rodriguez MD  PCP: Bing Ramsay, APRN - CNP    Discharging Nurse: [employer] [employer] [facility] Unit/Room#: 2114/2114-01  Discharging Unit Phone Number: [phone]    Emergency Contact:   Extended Emergency Contact Information  Primary Emergency Contact: Hailey Guido  Home Phone: [phone]  Relation: Brother/Sister  Secondary Emergency Contact: [address] [facility],[address] Phone: [phone]  Relation: Brother/Sister    Past Surgical History:  Past Surgical History:   Procedure Laterality Date    ANKLE FRACTURE SURGERY      AV FISTULA CREATION  2021    BACK SURGERY      BREAST SURGERY      CARDIAC CATHETERIZATION      MVD  /  CT CONSULT    CARDIAC SURGERY      CARPAL TUNNEL RELEASE      CHOLECYSTECTOMY, OPEN N/A     COLONOSCOPY      CORONARY ANGIOPLASTY WITH STENT PLACEMENT  2023    PTCA / FADIA RCA    CORONARY ANGIOPLASTY WITH STENT PLACEMENT  2019    STENT X1  [facility] GRAFT  2005    [address] [facility] Right 2023    CVC CATHETER REPLACEMENT right chest performed by Angelina Mesa MD at [facility] Left 2021    LEFT AV FISTULA CREATION UPPER EXTREMITY performed by Alia Barrios MD at [facility] Left 2023    LEFT AV FISTULA REVISION WITH SUPERFICIALIZATION performed by Angelina Mesa MD at [address] [facility]      IR TUNNELED CATHETER PLACEMENT GREATER THAN 5 YEARS  2021    IR TUNNELED CATHETER PLACEMENT GREATER THAN 5 YEARS 2021 [facility]    IR TUNNELED CATHETER PLACEMENT GREATER THAN 5 YEARS  2023    IR TUNNELED CATHETER PLACEMENT GREATER

## 2023-08-08 NOTE — PLAN OF CARE
Problem: Discharge Planning  Goal: Discharge to home or other facility with appropriate resources  8/1/2023 1808 by Lisandra Pineda RN  Outcome: Progressing     Problem: Pain  Goal: Verbalizes/displays adequate comfort level or baseline comfort level  8/1/2023 1808 by Lisandra Pineda RN  Outcome: Progressing     Problem: ABCDS Injury Assessment  Goal: Absence of physical injury  8/1/2023 1808 by Lisandra Pineda RN  Outcome: Progressing     Problem: Safety - Adult  Goal: Free from fall injury  8/1/2023 1808 by Lisandra Pineda RN  Outcome: Progressing     Problem: Chronic Conditions and Co-morbidities  Goal: Patient's chronic conditions and co-morbidity symptoms are monitored and maintained or improved  8/1/2023 1808 by Lisandra Pineda RN  Outcome: Progressing
Problem: Discharge Planning  Goal: Discharge to home or other facility with appropriate resources  8/2/2023 1649 by Jorge Youssef RN  Outcome: Progressing     Problem: Pain  Goal: Verbalizes/displays adequate comfort level or baseline comfort level  8/2/2023 1649 by Jorge Youssef RN  Outcome: Progressing     Problem: ABCDS Injury Assessment  Goal: Absence of physical injury  8/2/2023 1649 by Jorge Youssef RN  Outcome: Progressing     Problem: Safety - Adult  Goal: Free from fall injury  8/2/2023 1649 by Jorge Youssef RN  Outcome: Progressing
Problem: Discharge Planning  Goal: Discharge to home or other facility with appropriate resources  8/4/2023 1628 by Satnam Carreno RN  Outcome: Progressing     Problem: Pain  Goal: Verbalizes/displays adequate comfort level or baseline comfort level  8/4/2023 1628 by Satnam Carreno RN  Outcome: Progressing     Problem: ABCDS Injury Assessment  Goal: Absence of physical injury  8/4/2023 1628 by Satnam Carreno RN  Outcome: Progressing     Problem: Safety - Adult  Goal: Free from fall injury  8/4/2023 1628 by Satnam Carreno RN  Outcome: Progressing     Problem: Chronic Conditions and Co-morbidities  Goal: Patient's chronic conditions and co-morbidity symptoms are monitored and maintained or improved  8/4/2023 1628 by Satnam Carreno RN  Outcome: Progressing     Problem: Skin/Tissue Integrity  Goal: Absence of new skin breakdown  8/4/2023 1628 by Satnam Carreno RN  Outcome: Progressing
Problem: Discharge Planning  Goal: Discharge to home or other facility with appropriate resources  Outcome: Progressing     Problem: Pain  Goal: Verbalizes/displays adequate comfort level or baseline comfort level  Outcome: Progressing     Problem: ABCDS Injury Assessment  Goal: Absence of physical injury  Outcome: Progressing     Problem: Safety - Adult  Goal: Free from fall injury  Outcome: Progressing     Problem: Chronic Conditions and Co-morbidities  Goal: Patient's chronic conditions and co-morbidity symptoms are monitored and maintained or improved  Outcome: Progressing     Problem: Skin/Tissue Integrity  Goal: Absence of new skin breakdown  Description: 1. Monitor for areas of redness and/or skin breakdown  2. Assess vascular access sites hourly  3. Every 4-6 hours minimum:  Change oxygen saturation probe site  4. Every 4-6 hours:  If on nasal continuous positive airway pressure, respiratory therapy assess nares and determine need for appliance change or resting period.   Outcome: Progressing
Problem: Discharge Planning  Goal: Discharge to home or other facility with appropriate resources  Outcome: Progressing  Dc plan to go home     Problem: Pain  Goal: Verbalizes/displays adequate comfort level or baseline comfort level  Outcome: Progressing  Pt medicated with pain medication prn. Assessed all pain characteristics including level, type, location, frequency, and onset. Non-pharmacologic interventions offered to pt as well. Pt states pain is tolerable at this time. Will continue to monitor. Problem: Safety - Adult  Goal: Free from fall injury  Outcome: Progressing  Pt assessed as a fall risk this shift. Remains free from falls and accidental injury at this time. Fall precautions in place, including falling star sign and fall risk band on pt. Floor free from obstacles, and bed is locked and in lowest position. Adequate lighting provided. Pt encouraged to call before getting OOB for any need. Bed alarm activated. Will continue to monitor needs during hourly rounding, and reinforce education on use of call light. Problem: Skin/Tissue Integrity  Goal: Absence of new skin breakdown  Description: 1. Monitor for areas of redness and/or skin breakdown  2. Assess vascular access sites hourly  3. Every 4-6 hours minimum:  Change oxygen saturation probe site  4. Every 4-6 hours:  If on nasal continuous positive airway pressure, respiratory therapy assess nares and determine need for appliance change or resting period. Outcome: Progressing  Skin assessment complete. Waffle mattress in place. Coccyx reddened. Sensicare applied PRN. Turned and repositioned every two hours. Area kept free from moisture. Proper nourishment and fluids encouraged, as appropriate. Will continue to monitor for additional needs and changes in skin breakdown.
Problem: Discharge Planning  Goal: Discharge to home or other facility with appropriate resources  Outcome: Progressing  Flowsheets (Taken 8/3/2023 1649)  Discharge to home or other facility with appropriate resources:   Identify barriers to discharge with patient and caregiver   Arrange for needed discharge resources and transportation as appropriate   Identify discharge learning needs (meds, wound care, etc)     Problem: Pain  Goal: Verbalizes/displays adequate comfort level or baseline comfort level  Outcome: Progressing  Flowsheets (Taken 8/3/2023 1649)  Verbalizes/displays adequate comfort level or baseline comfort level:   Encourage patient to monitor pain and request assistance   Assess pain using appropriate pain scale   Implement non-pharmacological measures as appropriate and evaluate response  Note: Patient denies pain this shift. Problem: ABCDS Injury Assessment  Goal: Absence of physical injury  Outcome: Progressing  Flowsheets (Taken 8/3/2023 1649)  Absence of Physical Injury: Implement safety measures based on patient assessment     Problem: Safety - Adult  Goal: Free from fall injury  Outcome: Progressing  Flowsheets (Taken 8/3/2023 1649)  Free From Fall Injury: Instruct family/caregiver on patient safety  Note: The patient remained free from falls this shift, call light within reach, bed in locked and lowest position. Side rails up x2. Continue to monitor closely.       Problem: Chronic Conditions and Co-morbidities  Goal: Patient's chronic conditions and co-morbidity symptoms are monitored and maintained or improved  Outcome: Progressing  Flowsheets (Taken 8/3/2023 1649)  Care Plan - Patient's Chronic Conditions and Co-Morbidity Symptoms are Monitored and Maintained or Improved:   Monitor and assess patient's chronic conditions and comorbid symptoms for stability, deterioration, or improvement   Collaborate with multidisciplinary team to address chronic and comorbid conditions and prevent
Problem: Safety - Adult  Goal: Free from fall injury  8/2/2023 0450 by Brunilda Mack RN  Outcome: Progressing     Problem: Chronic Conditions and Co-morbidities  Goal: Patient's chronic conditions and co-morbidity symptoms are monitored and maintained or improved  8/2/2023 0450 by Brunilda Mack RN  Outcome: Progressing     Problem: Skin/Tissue Integrity  Goal: Absence of new skin breakdown  Description: 1. Monitor for areas of redness and/or skin breakdown  2. Assess vascular access sites hourly  3. Every 4-6 hours minimum:  Change oxygen saturation probe site  4. Every 4-6 hours:  If on nasal continuous positive airway pressure, respiratory therapy assess nares and determine need for appliance change or resting period.   Outcome: Progressing     Problem: Pain  Goal: Verbalizes/displays adequate comfort level or baseline comfort level  8/2/2023 0450 by Brunilda Mack RN  Outcome: Progressing     Problem: Discharge Planning  Goal: Discharge to home or other facility with appropriate resources  8/2/2023 0450 by Brunilda Mack RN  Outcome: Progressing
Problem: Safety - Adult  Goal: Free from fall injury  8/4/2023 0455 by Ignacio Fu RN  Outcome: Progressing     Problem: Pain  Goal: Verbalizes/displays adequate comfort level or baseline comfort level  8/4/2023 0455 by Ignacio Fu RN  Outcome: Progressing     Problem: Skin/Tissue Integrity  Goal: Absence of new skin breakdown  Description: 1. Monitor for areas of redness and/or skin breakdown  2. Assess vascular access sites hourly  3. Every 4-6 hours minimum:  Change oxygen saturation probe site  4. Every 4-6 hours:  If on nasal continuous positive airway pressure, respiratory therapy assess nares and determine need for appliance change or resting period.   8/4/2023 0455 by Ignacio Fu RN  Outcome: Progressing     Problem: Chronic Conditions and Co-morbidities  Goal: Patient's chronic conditions and co-morbidity symptoms are monitored and maintained or improved  8/4/2023 0455 by Ignacio Fu RN  Outcome: Progressing     Problem: Discharge Planning  Goal: Discharge to home or other facility with appropriate resources  8/4/2023 0455 by Ignacio Fu RN  Outcome: Progressing
Problem: Safety - Adult  Goal: Free from fall injury  Outcome: Progressing     Problem: Chronic Conditions and Co-morbidities  Goal: Patient's chronic conditions and co-morbidity symptoms are monitored and maintained or improved  Outcome: Progressing     Problem: Pain  Goal: Verbalizes/displays adequate comfort level or baseline comfort level  Outcome: Progressing     Problem: Discharge Planning  Goal: Discharge to home or other facility with appropriate resources  Outcome: Progressing
provided. Also made sure that the pt. Was wearing non-slip socks.

## 2023-08-08 NOTE — CARE COORDINATION
Writer was notified, by Kim Lemon, from [facility], that pt & Pt's Sister, wants her to return to home w/ [facility]. Writer informed José Ruiz, from [facility] & will fax [facility]/DC med list & informed of DC today.

## 2023-08-09 ENCOUNTER — CARE COORDINATION (OUTPATIENT)
Dept: CARE COORDINATION | Age: 63
End: 2023-08-09

## 2023-08-09 ENCOUNTER — CARE COORDINATION (OUTPATIENT)
Dept: CASE MANAGEMENT | Age: 63
End: 2023-08-09

## 2023-08-09 DIAGNOSIS — I20.0 UNSTABLE ANGINA (HCC): Primary | ICD-10-CM

## 2023-08-09 PROCEDURE — 1111F DSCHRG MED/CURRENT MED MERGE: CPT | Performed by: NURSE PRACTITIONER

## 2023-08-09 NOTE — TELEPHONE ENCOUNTER
LM on VM stating appointment has been rescheduled and left day and time.  Stated if this does not work to call our office [phone]

## 2023-08-09 NOTE — CARE COORDINATION
Remote Patient Monitoring Note  Date/Time:  2023 4:11 PM  Patient Current Location: [place]  LPN contacted patient by telephone regarding yellow alert received for no BP x > 2 days. Verified patients name and  as identifiers. Background: Pt enrolled in RPM r/t HTN, CHF. Clinical Interventions: Reviewed and followed up on alerts and treatments-Pt updated that RPM tablet resumed and she can continue monitoring. Pt will update metrics later today or in the morning. Plan/Follow Up: Will continue to review, monitor and address alerts with follow up based on severity of symptoms and risk factors.

## 2023-08-09 NOTE — DISCHARGE SUMMARY
chest pain. All specialist notes, labs & imaging reviewed. The case was discussed with nursing staff. All consultants involved during this admission are agreeable to d/c. Consults:  cardiology, nephrology, neurology, and psychiatry    Significant Diagnostic/theraputic interventions: CT, dialysis      Disposition:   home with home care    Instructions to Patient:      Follow up with AFSANEH Mcfarlane CNP in  1-2 weeks    Discharge Medications:       Medication List        CHANGE how you take these medications      busPIRone 10 MG tablet  Commonly known as: BUSPAR  Take 1 tablet by mouth 3 times daily  What changed:   medication strength  how much to take     Lantus SoloStar 100 UNIT/ML injection pen  Generic drug: insulin glargine  Inject 65 Units into the skin 2 times daily  What changed: how much to take     NovoLOG FlexPen 100 UNIT/ML injection pen  Generic drug: insulin aspart  Inject 17 Units into the skin 3 times daily (before meals) Plus sliding scale coverage as needed:  Glucose:Dose:(: No Insulin)  (140-199: 3 Units)  (200-249: 6 Units)  (250-299: 9 Units)  (300-349: 12 Units)  (350-400: 15 Units)  (Over 400: 18 Units)  What changed:   how much to take  additional instructions     venlafaxine 75 MG extended release capsule  Commonly known as: EFFEXOR XR  Take 1 capsule by mouth daily (with breakfast)  Start taking on: August 9, 2023  What changed:   medication strength  how much to take            CONTINUE taking these medications      acetaminophen 325 MG tablet  Commonly known as: TYLENOL     allopurinol 100 MG tablet  Commonly known as: ZYLOPRIM  Take 1 tablet by mouth daily     aspirin 81 MG chewable tablet  Take 1 tablet by mouth daily     atorvastatin 80 MG tablet  Commonly known as: LIPITOR  Take 1 tablet by mouth nightly     AZO-CRANBERRY PO     blood glucose test strips  Test 3 times a day & as needed for symptoms of irregular blood glucose.  Dispense sufficient amount for indicated

## 2023-08-09 NOTE — CARE COORDINATION
[facility] Care Transitions Initial Follow Up Call    Call within 2 business days of discharge: Yes    Patient Current Location: [address] [place], [place] [address] Transition Nurse contacted the patient by telephone to perform post hospital discharge assessment. Verified name and  with patient as identifiers. Provided introduction to self, and explanation of the Care Transition Nurse role. Patient: Adriana Arcos Patient : 1960   MRN: [id number]  Reason for Admission: Chest pain  Discharge Date: 23 RARS: Readmission Risk Score: 34      Last Discharge [address] [facility] [facility]       Date Complaint Diagnosis Description Type Department Provider    23 Chest Pain Chest pain, unspecified type . .. ED to Hosp-Admission (Discharged) (ADMITTED) MOE Vieyra MD; Ashley Cornelius... Was this an external facility discharge? No Discharge Facility: [facility] to be reviewed by the provider   Additional needs identified to be addressed with provider: Yes  7 day hospital follow up appointment               Method of communication with provider: staff message. Was able to contact Vahid Martinez for initial transitional outreach. She stated that she was doing \"ok\". She said that she did not sleep swell last night and that she was currently at dialysis. She said that she had all her home medications and will be picking up the newly prescribed after she completes dialysis. She is aware of the medications changes. She said that Med 1 care nurse will be out today She will be calling her follow ups today or tomorrow. Will reach out again tomorrow due to pt being at dialysis. She did ask about reactivating her RPM.  Informed that Rivas Miley will notify RPM to have it reactivated. Care Transition Nurse reviewed discharge instructions with patient who verbalized understanding. The patient was given an opportunity to ask questions and does not have any further questions or concerns at this time.  Were discharge instructions

## 2023-08-10 ENCOUNTER — CARE COORDINATION (OUTPATIENT)
Dept: CASE MANAGEMENT | Age: 63
End: 2023-08-10

## 2023-08-10 NOTE — CARE COORDINATION
Remote Patient Monitoring Note  Date/Time:  8/10/2023 2:08 PM  Patient Current Location: [place]  Background: ENROLLED IN [facility] FOR HTN AND CHF  No metrics entered  Clinical Interventions: Reviewed and followed up on alerts and treatments-Pt noted to have surgery today   UPPER EXTREMITY FISTULAGRAM  Plan/Follow Up: Will continue to review, monitor and address alerts with follow up based on severity of symptoms and risk factors.

## 2023-08-11 ENCOUNTER — TELEPHONE (OUTPATIENT)
Dept: PHARMACY | Age: 63
End: 2023-08-11

## 2023-08-11 ENCOUNTER — CARE COORDINATION (OUTPATIENT)
Dept: CARE COORDINATION | Age: 63
End: 2023-08-11

## 2023-08-11 NOTE — CARE COORDINATION
Ambulatory Care Coordination Note  2023    Patient Current Location: [place]     ACM contacted the patient by telephone. Verified name and  with patient as identifiers. Provided introduction to self, and explanation of the ACM role. Challenges to be reviewed by the provider   Additional needs identified to be addressed with provider: No  none               Method of communication with provider: none. Date Care Coordination Episode Started:  23      Reason patient is in Care Coordination:     CTN hand off    Topics Discussed Today:     Medications, no needs  Home health started on 8.10.23, will be out 2 x weekly per patient. CHF, DM, no concerns today. Chest pain resolved since hospitalization. PCP appointment reminder provided. Assessments completed today:     Fall risk  Initial assessment  Medication reconciliation  SDOH  DM, CHF (Health assessments)      Care Coordinator plan of care: Follow up call in 1-2 weeks, continue education and support. Offered patient enrollment in the Remote Patient Monitoring (RPM) program for in-home monitoring: Yes, patient already enrolled. Care Coordination Interventions    Referral from Primary Care Provider: No  Suggested Interventions and [address]: Completed (Comment: med 1)  Transportation Support: Declined  Zone Management Tools: In Process          Goals Addressed                   This Visit's Progress     Self Monitoring   On track     Self-Monitored Blood Glucose - I will check my blood sugar Fasting blood sugar  I will notify my provider of any trends of increasing or decreasing blood sugars over a 1 month period. I will notify my provider if I have any blood sugar readings less than 70 more than 2 times a month. Daily Weights - I will notify my provider of any increase in weight by 3 or more pounds in 2 days OR 5 or more pounds in a week.   Blood Pressure - I will notify my provider of any

## 2023-08-11 NOTE — TELEPHONE ENCOUNTER
New referral to our service for diabetes. Called to discuss with patient and to clarify patient's primary care provider. Referral may have been placed during recently inpatient stay by provider seeing patient in hospital. If patient interested [facility] seem [facility]. Shalonda Ovalles [facility],Pharm. D,, BCPS, CACP  8/11/2023  4:22 PM

## 2023-08-14 ENCOUNTER — TELEPHONE (OUTPATIENT)
Dept: PHARMACY | Age: 63
End: 2023-08-14

## 2023-08-17 ENCOUNTER — CARE COORDINATION (OUTPATIENT)
Dept: CARE COORDINATION | Age: 63
End: 2023-08-17

## 2023-08-17 ENCOUNTER — TELEPHONE (OUTPATIENT)
Dept: PHARMACY | Age: 63
End: 2023-08-17

## 2023-08-17 DIAGNOSIS — E11.65 TYPE 2 DIABETES MELLITUS WITH HYPERGLYCEMIA, WITH LONG-TERM CURRENT USE OF INSULIN (HCC): Primary | ICD-10-CM

## 2023-08-17 DIAGNOSIS — Z79.4 TYPE 2 DIABETES MELLITUS WITH HYPERGLYCEMIA, WITH LONG-TERM CURRENT USE OF INSULIN (HCC): Primary | ICD-10-CM

## 2023-08-17 NOTE — CARE COORDINATION
Remote Alert Monitoring Note  Rpm alert to be reviewed by the provider   red alert   pulse ox reading (86%)   Additional needs to be addressed by N/A: No      Date/Time:  2023 12:59 PM  Patient Current Location: [facility] contacted patient by telephone. Verified patients name and  as identifiers. Background: Pt enrolled in RPM r/t CHF, HTN. Refer to 911 immediately if:  Patient unresponsive or unable to provide history  Change in cognition or sudden confusion  Patient unable to respond in complete sentences  Intense chest pain/tightness  Any concern for any clinical emergency  Red Alert: Provider response time of 1 hr required for any red alert requiring intervention  Yellow Alert: Provider response time of 3hr required for any escalated yellow alert  O2 Triage  Are you having any Chest Pain? no   Are you having any Shortness of Breath? no   Swelling in your hands or feet? no   Are you having any other health concerns or issues? no   Clinical Interventions: Reviewed and followed up on alerts and treatments-Spoke with pt who is in no apparent distress. States she had just walked from the bedroom to the bathroom prior to check oxygen level. She is agreeable to recheck at this time, but oximeter will not come on. Reached out to tech support for pt and they will call to assist with equipment. Plan/Follow Up: Will continue to review, monitor and address alerts with follow up based on severity of symptoms and risk factors.

## 2023-08-17 NOTE — CARE COORDINATION
8/17/23 3:27 PM Patient is currently enrolled in Remote Patient Monitoring for CHF and HTN. Pt called and stated that tech support is going to be sending her a new oximeter. Plan/Follow Up: Will continue to review, monitor and address alerts with follow up based on severity of symptoms and risk factors.

## 2023-08-17 NOTE — TELEPHONE ENCOUNTER
Yes, referral placed. Thank you for any input. Previous hemoglobin A1c in June was 7.5. However recent hospitalizations her sliding scale was not followed apparently. Has gotten away out of range with sugars running from 300-400.

## 2023-08-17 NOTE — TELEPHONE ENCOUNTER
Spoke with patient in regards to referral to [facility] for Diabetes medication management from Dr. Malini Benton. Pt states she is interested in coming to the clinic and would like to set up initial appointment. Patient sees Xander Rousseau CNP at the same practice as Dr. Malini Benton. Will send request to Xander Rousseau CNP to get referral from the provider patient is established with. Patient has dialysis Mon/Wed/Fri and requests a Thursday appointment so sister can attend with her. Pt instructed to bring in blood sugar log and medication list. Patient has clinic location and telephone number to call if she needs to reschedule.      Orion Escobar, Pharm D, [facility]  [facility]  8/17/2023 9:21 AM

## 2023-08-18 ENCOUNTER — CARE COORDINATION (OUTPATIENT)
Dept: CARE COORDINATION | Age: 63
End: 2023-08-18

## 2023-08-18 NOTE — CARE COORDINATION
Writer received an email message from Corey Farfan LPN, that patient will need a kit exchange due to defective pulse ox. Writer spoke to the patient she declined kit exchange. Patient sates she initially put the batteries in wrong. She has placed them in correctly and it is working now. Writer did not place kit exchange order. Writer will update LPN and ACM.

## 2023-08-21 ENCOUNTER — CARE COORDINATION (OUTPATIENT)
Dept: CARE COORDINATION | Age: 63
End: 2023-08-21

## 2023-08-21 NOTE — CARE COORDINATION
Writer reached out to patient and unable to reach, a voicemail message left regarding kit exchange. CCSS place call to patient to arrange equipment exchange per HRS IT due to pulse oximeter not working. RMA submitted for [id number]. New equipment has been ordered. CCSS explained to patient equipment arrival, return, and installation of new equipment. Will route to LPN and ACM/CTN for awareness.

## 2023-08-23 ENCOUNTER — CARE COORDINATION (OUTPATIENT)
Dept: CARE COORDINATION | Age: 63
End: 2023-08-23

## 2023-08-23 VITALS — DIASTOLIC BLOOD PRESSURE: 79 MMHG | SYSTOLIC BLOOD PRESSURE: 167 MMHG | HEART RATE: 93 BPM | OXYGEN SATURATION: 98 %

## 2023-08-23 DIAGNOSIS — I10 HYPERTENSION, ESSENTIAL: Primary | ICD-10-CM

## 2023-08-23 DIAGNOSIS — I50.33 ACUTE ON CHRONIC DIASTOLIC HEART FAILURE (HCC): ICD-10-CM

## 2023-08-23 NOTE — PROGRESS NOTES
Remote Patient Order Discontinued    Received request from Baldomero Zamora RN  to discontinue order for remote patient monitoring of CHF and HTN and order completed.
solids

## 2023-08-23 NOTE — CARE COORDINATION
Ambulatory Care Coordination Note  2023    Patient Current Location:  Home: [address] [place] [id number]     ACM contacted the patient by telephone. Verified name and  with patient as identifiers. Provided introduction to self, and explanation of the ACM role. Challenges to be reviewed by the provider   Additional needs identified to be addressed with provider: Yes  Patient reports her blood sugar has been between 400-500, she is asking if she needs to add any additional diabetic medications? She would also like a CGM (Dexcom) ordered. Method of communication with provider: chart routing. (staff calls)    Date Care Coordination Episode Started:  23      Reason patient is in Care Coordination:     CTN hand off     Topics Discussed Today:     DM, patient reports blood sugar has been 400-500 most of the time, is taking insulin as directed. She is asking if she should have another medication added, routed to PCP. She would also like a dexcom CGM. CHF, no concerns today. Dialysis, patient was at dialysis the time of this call. RPM, patient has only had 1 alert ever per RPM, she is ready and agrees to graduating. Assessments completed today:     Fall risk  Initial assessment  Medication reconciliation  SDOH  DM, CHF (Health assessments)      Care Coordinator plan of care: Follow up with PCP recommendations. Offered patient enrollment in the Remote Patient Monitoring (RPM) program for in-home monitoring: Remote Patient Monitoring Graduation      Date/Time:  2023 12:45 PM  Patient Current Location: [place]  Patient has graduated from the Remote Patient Monitoring program on 2023. RPM goals have been met at this time. Patient has been provided instruction on process to return RPM equipment and RPM has been deactivated. Patient has ACM's contact information for any further questions, concerns, or needs.  .          Care Coordination Interventions

## 2023-08-23 NOTE — CARE COORDINATION
CCSS placed call to patient to arrange RPM kit  through [facility]. Reviewed with patient how to pack equipment in original packing. Verified patient's availability to schedule UPS  time. UPS  time requested.  Anticipated  date range 2-4 business days

## 2023-08-30 ENCOUNTER — CARE COORDINATION (OUTPATIENT)
Dept: CARE COORDINATION | Age: 63
End: 2023-08-30

## 2023-08-30 NOTE — CARE COORDINATION
Ambulatory Care Coordination Note  2023    Patient Current Location:  Home: [address] [facility] [address]     ACM contacted the patient by telephone. Verified name and  with patient as identifiers. Provided introduction to self, and explanation of the ACM role. Challenges to be reviewed by the provider   Additional needs identified to be addressed with provider: Yes  Continued high blood sugar, per patient she is taking all of her medications correctly. Blood sugar running 200-300. She is asking about jardiance. Method of communication with provider: chart routing. Date Care Coordination Episode Started:  23      Reason patient is in Care Coordination:     CTN handoff    Topics Discussed Today:     DM, patient reports blood sugar has been 200-300 most of the time, is taking insulin as directed. Message routed to PCP. CHF, no concerns today. Dialysis, going fine per patient she is fatigued following dialysis. Dexcom was ordered by PCP. Assessments completed today:     Fall risk  Initial assessment  Medication reconciliation  SDOH  CHF, DM  (Health assessments)      Care Coordinator plan of care: Follow up call in 1 week, check on needs, continue education and support. Offered patient enrollment in the Remote Patient Monitoring (RPM) program for in-home monitoring:  patient recently graduated from RPM .       Goals Addressed                   This Visit's Progress     Self Monitoring   Improving     Self-Monitored Blood Glucose - I will check my blood sugar Fasting blood sugar  I will notify my provider of any trends of increasing or decreasing blood sugars over a 1 month period. I will notify my provider if I have any blood sugar readings less than 70 more than 2 times a month. Daily Weights - I will notify my provider of any increase in weight by 3 or more pounds in 2 days OR 5 or more pounds in a week.   Blood Pressure - I will notify my provider of any

## 2023-08-31 NOTE — CARE COORDINATION
Patient notified of PCP recommendations and stated that she understood. Meenakshi Drilling, APRN - CNP Scarlet Son and Irene Morgan are not appropriate for those with renal failure, the kidneys have to work properly for the medication to work. Labs as planned       Patient denied any needs today, she is resting at this time.

## 2023-09-01 ENCOUNTER — CARE COORDINATION (OUTPATIENT)
Dept: CARE COORDINATION | Age: 63
End: 2023-09-01

## 2023-09-01 NOTE — CARE COORDINATION
Patient called stating she does not think her wrist BP cuff works very well. She is asking for an arm BP cuff to be ordered to her pharmacy. Will pend to PCP.

## 2023-09-02 PROBLEM — R77.8 ELEVATED TROPONIN: Status: RESOLVED | Noted: 2023-08-03 | Resolved: 2023-09-02

## 2023-09-02 PROBLEM — R79.89 ELEVATED TROPONIN: Status: RESOLVED | Noted: 2023-08-03 | Resolved: 2023-09-02

## 2023-09-06 ENCOUNTER — CARE COORDINATION (OUTPATIENT)
Dept: CARE COORDINATION | Age: 63
End: 2023-09-06

## 2023-09-06 NOTE — CARE COORDINATION
Spoke briefly to patient who was currently at dialysis. She denied any needs and said that she will check with her pharmacy regarding the BP cuff order. Denied any needs today, kept conversation short due to dialysis.

## 2023-09-07 ENCOUNTER — HOSPITAL ENCOUNTER (OUTPATIENT)
Dept: PHARMACY | Age: 63
Setting detail: THERAPIES SERIES
Discharge: HOME | End: 2023-09-07
Payer: COMMERCIAL

## 2023-09-07 VITALS
SYSTOLIC BLOOD PRESSURE: 133 MMHG | DIASTOLIC BLOOD PRESSURE: 68 MMHG | BODY MASS INDEX: 38.07 KG/M2 | HEART RATE: 83 BPM | OXYGEN SATURATION: 99 % | WEIGHT: 228.8 LBS

## 2023-09-07 PROCEDURE — 99213 OFFICE O/P EST LOW 20 MIN: CPT

## 2023-09-07 NOTE — DISCHARGE INSTRUCTIONS
Increase Lantus to 65 units twice daily  Increase Novolog to 18 units with meals plus sliding scale coverage  Eat more lean meats and veggies  Exercise (walk) as able  Make Eye Appt   [facility]:[phone]

## 2023-09-07 NOTE — PROGRESS NOTES
mg dose, Disp: 60 tablet, Rfl: 2    bumetanide (BUMEX) 1 MG tablet, Take 1 tablet by mouth 2 times daily Indications: with 2 mg tablet to make 3 mg dose, Disp: , Rfl:     cyclobenzaprine (FLEXERIL) 5 MG tablet, Take 1 tablet by mouth 3 times daily as needed for Muscle spasms, Disp: , Rfl:     midodrine (PROAMATINE) 5 MG tablet, Take 1 tablet by mouth as needed (up to 2 tablets as needed for SBP <100 during dialysis), Disp: , Rfl:     Continuous Blood Gluc Sensor (DEXCOM G7 SENSOR) MISC, 1 each by Does not apply route every 14 days, Disp: 2 each, Rfl: 5    Continuous Blood Gluc Transmit (DEXCOM G6 TRANSMITTER) MISC, 1 each by Does not apply route in the morning, at noon, in the evening, and at bedtime, Disp: 1 each, Rfl: 1    sodium chloride (OCEAN, BABY AYR) 0.65 % nasal spray, 1 spray by Nasal route every 4 hours as needed for Congestion, Disp: 88 mL, Rfl: 1    atorvastatin (LIPITOR) 80 MG tablet, Take 1 tablet by mouth nightly, Disp: 30 tablet, Rfl: 4    Melatonin 10 MG TABS, Take 10 mg by mouth nightly as needed (for sleep) Indications: Trouble Sleeping (Patient not taking: Reported on 9/7/2023), Disp: , Rfl:     allopurinol (ZYLOPRIM) 100 MG tablet, Take 1 tablet by mouth daily, Disp: 30 tablet, Rfl: 5    Insulin Pen Needle (EASY TOUCH PEN NEEDLES) 29G X 12MM MISC, 5 each by Does not apply route daily, Disp: 150 each, Rfl: 5    Blood Glucose Monitoring Suppl (TRUE METRIX GO GLUCOSE METER) w/Device KIT, 1 each by Does not apply route 4 times daily, Disp: 1 kit, Rfl: 1    blood glucose monitor strips, Test 3 times a day & as needed for symptoms of irregular blood glucose. Dispense sufficient amount for indicated testing frequency plus additional to accommodate PRN testing needs. , Disp: 100 strip, Rfl: 11    AZO-CRANBERRY PO, Take 2 capsules by mouth at bedtime, Disp: , Rfl:     Calcium Polycarbophil (FIBER-CAPS PO), Take 2 capsules by mouth in the morning and at bedtime, Disp: , Rfl:     acetaminophen (TYLENOL)

## 2023-09-13 ENCOUNTER — CARE COORDINATION (OUTPATIENT)
Dept: CARE COORDINATION | Age: 63
End: 2023-09-13

## 2023-09-13 NOTE — CARE COORDINATION
Ambulatory Care Coordination Note  2023    Patient Current Location:  Home: [address] [place] [id number]     ACM contacted the patient by telephone. Verified name and  with patient as identifiers. Provided introduction to self, and explanation of the ACM role. Challenges to be reviewed by the provider   Additional needs identified to be addressed with provider: Yes  medications-refill pended to provider. Method of communication with provider: chart routing. Date Care Coordination Episode Started:  23      Reason patient is in Care Coordination:     CTN hand off     Topics Discussed Today:     DM, per patient her glucose readings are up and down, no different than usually. Patient is at dialysis and could not provide readings. CHF, no concerns today. Equipment, would like the BP cuff order sent to [facility] on [address]. Needs the CGM sent to a formerly [facility] does not have these. Will route to MA at PCP office. Assessments completed today:     Fall risk  Initial assessment  Medication reconciliation  SDOH  DM, CHF (Health assessments)      Care Coordinator plan of care: Follow up call in 1-2 weeks, continue education and support. Offered patient enrollment in the Remote Patient Monitoring (RPM) program for in-home monitoring:  graduated on 23       Goals Addressed                   This Visit's Progress     Self Monitoring   Improving     Self-Monitored Blood Glucose - I will check my blood sugar Fasting blood sugar  I will notify my provider of any trends of increasing or decreasing blood sugars over a 1 month period. I will notify my provider if I have any blood sugar readings less than 70 more than 2 times a month. Daily Weights - I will notify my provider of any increase in weight by 3 or more pounds in 2 days OR 5 or more pounds in a week.   Blood Pressure - I will notify my provider of any changes in blood pressure associated with symptoms of

## 2023-09-18 NOTE — CARE COORDINATION
Patient has been approved for Dialysis at [facility] on Monday, Wednesday, and Friday at 2:00pm. First date of service is 3/10 and patient is to arrive at 1:45pm.    SW informed facility, and patient requires a new authorization to admit to [facility]. Electronically signed by AZ Johnston on 3/7/2023 at 12:25 PM     Authorization has been extended. Patient can admit to [facility] after dialysis 3/8. Informed bedside nurse Robby Russell RN.     Electronically signed by AZ Johnston on 3/7/2023 at 3:31 PM good balance

## 2023-09-25 ENCOUNTER — CARE COORDINATION (OUTPATIENT)
Dept: CARE COORDINATION | Age: 63
End: 2023-09-25

## 2023-09-27 ENCOUNTER — CARE COORDINATION (OUTPATIENT)
Dept: CARE COORDINATION | Age: 63
End: 2023-09-27

## 2023-09-27 NOTE — CARE COORDINATION
Patient called and eft VM asking about the BP cuff order, Dexcom order and Psychology  referral.   ACM returned call gave patient the information and contact number for the referral.   Explained that I will have Yarelis Edwards MA at the office follow up on equipment.

## 2023-10-03 ENCOUNTER — APPOINTMENT (OUTPATIENT)
Dept: GENERAL RADIOLOGY | Age: 63
End: 2023-10-03
Payer: COMMERCIAL

## 2023-10-03 ENCOUNTER — TELEPHONE (OUTPATIENT)
Dept: PHARMACY | Age: 63
End: 2023-10-03

## 2023-10-03 ENCOUNTER — HOSPITAL ENCOUNTER (OUTPATIENT)
Age: 63
Setting detail: OBSERVATION
LOS: 2 days | Discharge: HOME | End: 2023-10-05
Attending: EMERGENCY MEDICINE | Admitting: STUDENT IN AN ORGANIZED HEALTH CARE EDUCATION/TRAINING PROGRAM
Payer: COMMERCIAL

## 2023-10-03 DIAGNOSIS — I20.2 REFRACTORY ANGINA PECTORIS (HCC): Primary | ICD-10-CM

## 2023-10-03 DIAGNOSIS — R07.9 CHEST PAIN WITH HIGH RISK FOR CARDIAC ETIOLOGY: ICD-10-CM

## 2023-10-03 LAB
ALBUMIN SERPL-MCNC: 3.9 G/DL (ref 3.5–5.2)
ALP SERPL-CCNC: 127 U/L (ref 35–104)
ALT SERPL-CCNC: 21 U/L (ref 5–33)
ANION GAP SERPL CALCULATED.3IONS-SCNC: 20 MMOL/L (ref 9–17)
AST SERPL-CCNC: 22 U/L
BASOPHILS # BLD: 0.1 K/UL (ref 0–0.2)
BASOPHILS NFR BLD: 2 % (ref 0–2)
BILIRUB SERPL-MCNC: 0.9 MG/DL (ref 0.3–1.2)
BNP SERPL-MCNC: 1411 PG/ML
BUN SERPL-MCNC: 29 MG/DL (ref 8–23)
CALCIUM SERPL-MCNC: 9.7 MG/DL (ref 8.6–10.4)
CHLORIDE SERPL-SCNC: 96 MMOL/L (ref 98–107)
CHP ED QC CHECK: YES
CHP ED QC CHECK: YES
CO2 SERPL-SCNC: 22 MMOL/L (ref 20–31)
CREAT SERPL-MCNC: 3.5 MG/DL (ref 0.5–0.9)
D DIMER PPP FEU-MCNC: 0.46 UG/ML FEU (ref 0–0.59)
EOSINOPHIL # BLD: 0.1 K/UL (ref 0–0.4)
EOSINOPHILS RELATIVE PERCENT: 2 % (ref 0–4)
ERYTHROCYTE [DISTWIDTH] IN BLOOD BY AUTOMATED COUNT: 15.4 % (ref 11.5–14.9)
GFR SERPL CREATININE-BSD FRML MDRD: 14 ML/MIN/1.73M2
GLUCOSE BLD-MCNC: 277 MG/DL
GLUCOSE BLD-MCNC: 277 MG/DL (ref 65–105)
GLUCOSE BLD-MCNC: 304 MG/DL (ref 65–105)
GLUCOSE BLD-MCNC: 419 MG/DL
GLUCOSE BLD-MCNC: 419 MG/DL (ref 65–105)
GLUCOSE SERPL-MCNC: 413 MG/DL (ref 70–99)
HCT VFR BLD AUTO: 34.9 % (ref 36–46)
HGB BLD-MCNC: 11.7 G/DL (ref 12–16)
LYMPHOCYTES NFR BLD: 1.5 K/UL (ref 1–4.8)
LYMPHOCYTES RELATIVE PERCENT: 19 % (ref 24–44)
MAGNESIUM SERPL-MCNC: 2.1 MG/DL (ref 1.6–2.6)
MCH RBC QN AUTO: 34.2 PG (ref 26–34)
MCHC RBC AUTO-ENTMCNC: 33.4 G/DL (ref 31–37)
MCV RBC AUTO: 102.3 FL (ref 80–100)
MONOCYTES NFR BLD: 0.5 K/UL (ref 0.1–1.3)
MONOCYTES NFR BLD: 6 % (ref 1–7)
NEUTROPHILS NFR BLD: 71 % (ref 36–66)
NEUTS SEG NFR BLD: 5.6 K/UL (ref 1.3–9.1)
PLATELET # BLD AUTO: 218 K/UL (ref 150–450)
PMV BLD AUTO: 8.3 FL (ref 6–12)
POTASSIUM SERPL-SCNC: 3.6 MMOL/L (ref 3.7–5.3)
PROT SERPL-MCNC: 7.1 G/DL (ref 6.4–8.3)
RBC # BLD AUTO: 3.42 M/UL (ref 4–5.2)
SODIUM SERPL-SCNC: 138 MMOL/L (ref 135–144)
TROPONIN I SERPL HS-MCNC: 110 NG/L (ref 0–14)
TROPONIN I SERPL HS-MCNC: 112 NG/L (ref 0–14)
WBC OTHER # BLD: 7.8 K/UL (ref 3.5–11)

## 2023-10-03 PROCEDURE — 99285 EMERGENCY DEPT VISIT HI MDM: CPT

## 2023-10-03 PROCEDURE — 6370000000 HC RX 637 (ALT 250 FOR IP)

## 2023-10-03 PROCEDURE — 6370000000 HC RX 637 (ALT 250 FOR IP): Performed by: STUDENT IN AN ORGANIZED HEALTH CARE EDUCATION/TRAINING PROGRAM

## 2023-10-03 PROCEDURE — 80053 COMPREHEN METABOLIC PANEL: CPT

## 2023-10-03 PROCEDURE — 2060000000 HC ICU INTERMEDIATE R&B

## 2023-10-03 PROCEDURE — 2580000003 HC RX 258: Performed by: STUDENT IN AN ORGANIZED HEALTH CARE EDUCATION/TRAINING PROGRAM

## 2023-10-03 PROCEDURE — 83735 ASSAY OF MAGNESIUM: CPT

## 2023-10-03 PROCEDURE — 85025 COMPLETE CBC W/AUTO DIFF WBC: CPT

## 2023-10-03 PROCEDURE — 85379 FIBRIN DEGRADATION QUANT: CPT

## 2023-10-03 PROCEDURE — 84484 ASSAY OF TROPONIN QUANT: CPT

## 2023-10-03 PROCEDURE — 71045 X-RAY EXAM CHEST 1 VIEW: CPT

## 2023-10-03 PROCEDURE — 36415 COLL VENOUS BLD VENIPUNCTURE: CPT

## 2023-10-03 PROCEDURE — 83880 ASSAY OF NATRIURETIC PEPTIDE: CPT

## 2023-10-03 PROCEDURE — 96372 THER/PROPH/DIAG INJ SC/IM: CPT

## 2023-10-03 PROCEDURE — 6360000002 HC RX W HCPCS: Performed by: STUDENT IN AN ORGANIZED HEALTH CARE EDUCATION/TRAINING PROGRAM

## 2023-10-03 PROCEDURE — 93005 ELECTROCARDIOGRAM TRACING: CPT

## 2023-10-03 PROCEDURE — 82947 ASSAY GLUCOSE BLOOD QUANT: CPT

## 2023-10-03 RX ORDER — CYCLOBENZAPRINE HCL 10 MG
5 TABLET ORAL 3 TIMES DAILY PRN
Status: DISCONTINUED | OUTPATIENT
Start: 2023-10-03 | End: 2023-10-05 | Stop reason: HOSPADM

## 2023-10-03 RX ORDER — DEXTROSE MONOHYDRATE 100 MG/ML
INJECTION, SOLUTION INTRAVENOUS CONTINUOUS PRN
Status: DISCONTINUED | OUTPATIENT
Start: 2023-10-03 | End: 2023-10-05 | Stop reason: HOSPADM

## 2023-10-03 RX ORDER — ACETAMINOPHEN 325 MG/1
650 TABLET ORAL EVERY 6 HOURS PRN
Status: DISCONTINUED | OUTPATIENT
Start: 2023-10-03 | End: 2023-10-05 | Stop reason: HOSPADM

## 2023-10-03 RX ORDER — SODIUM CHLORIDE 0.9 % (FLUSH) 0.9 %
5-40 SYRINGE (ML) INJECTION EVERY 12 HOURS SCHEDULED
Status: DISCONTINUED | OUTPATIENT
Start: 2023-10-03 | End: 2023-10-05 | Stop reason: HOSPADM

## 2023-10-03 RX ORDER — HEPARIN SODIUM 5000 [USP'U]/ML
5000 INJECTION, SOLUTION INTRAVENOUS; SUBCUTANEOUS EVERY 8 HOURS SCHEDULED
Status: DISCONTINUED | OUTPATIENT
Start: 2023-10-03 | End: 2023-10-05 | Stop reason: HOSPADM

## 2023-10-03 RX ORDER — ACETAMINOPHEN 650 MG/1
650 SUPPOSITORY RECTAL EVERY 6 HOURS PRN
Status: DISCONTINUED | OUTPATIENT
Start: 2023-10-03 | End: 2023-10-05 | Stop reason: HOSPADM

## 2023-10-03 RX ORDER — INSULIN LISPRO 100 [IU]/ML
0-16 INJECTION, SOLUTION INTRAVENOUS; SUBCUTANEOUS
Status: DISCONTINUED | OUTPATIENT
Start: 2023-10-04 | End: 2023-10-05 | Stop reason: HOSPADM

## 2023-10-03 RX ORDER — BUMETANIDE 1 MG/1
2 TABLET ORAL 2 TIMES DAILY
Status: DISCONTINUED | OUTPATIENT
Start: 2023-10-03 | End: 2023-10-05 | Stop reason: HOSPADM

## 2023-10-03 RX ORDER — SODIUM CHLORIDE 0.9 % (FLUSH) 0.9 %
5-40 SYRINGE (ML) INJECTION PRN
Status: DISCONTINUED | OUTPATIENT
Start: 2023-10-03 | End: 2023-10-05 | Stop reason: HOSPADM

## 2023-10-03 RX ORDER — BUSPIRONE HYDROCHLORIDE 10 MG/1
10 TABLET ORAL 3 TIMES DAILY
Status: DISCONTINUED | OUTPATIENT
Start: 2023-10-03 | End: 2023-10-05 | Stop reason: HOSPADM

## 2023-10-03 RX ORDER — ONDANSETRON 2 MG/ML
4 INJECTION INTRAMUSCULAR; INTRAVENOUS EVERY 6 HOURS PRN
Status: DISCONTINUED | OUTPATIENT
Start: 2023-10-03 | End: 2023-10-05 | Stop reason: HOSPADM

## 2023-10-03 RX ORDER — BUSPIRONE HYDROCHLORIDE 10 MG/1
10 TABLET ORAL ONCE
Status: COMPLETED | OUTPATIENT
Start: 2023-10-03 | End: 2023-10-03

## 2023-10-03 RX ORDER — LANOLIN ALCOHOL/MO/W.PET/CERES
3 CREAM (GRAM) TOPICAL NIGHTLY PRN
Status: DISCONTINUED | OUTPATIENT
Start: 2023-10-03 | End: 2023-10-05 | Stop reason: HOSPADM

## 2023-10-03 RX ORDER — ALLOPURINOL 100 MG/1
50 TABLET ORAL
Status: DISCONTINUED | OUTPATIENT
Start: 2023-10-04 | End: 2023-10-05 | Stop reason: HOSPADM

## 2023-10-03 RX ORDER — GABAPENTIN 100 MG/1
100 CAPSULE ORAL NIGHTLY
Status: DISCONTINUED | OUTPATIENT
Start: 2023-10-03 | End: 2023-10-05 | Stop reason: HOSPADM

## 2023-10-03 RX ORDER — ONDANSETRON 4 MG/1
4 TABLET, ORALLY DISINTEGRATING ORAL EVERY 8 HOURS PRN
Status: DISCONTINUED | OUTPATIENT
Start: 2023-10-03 | End: 2023-10-05 | Stop reason: HOSPADM

## 2023-10-03 RX ORDER — POLYETHYLENE GLYCOL 3350 17 G/17G
17 POWDER, FOR SOLUTION ORAL DAILY PRN
Status: DISCONTINUED | OUTPATIENT
Start: 2023-10-03 | End: 2023-10-05 | Stop reason: HOSPADM

## 2023-10-03 RX ORDER — INSULIN LISPRO 100 [IU]/ML
10 INJECTION, SOLUTION INTRAVENOUS; SUBCUTANEOUS ONCE
Status: COMPLETED | OUTPATIENT
Start: 2023-10-03 | End: 2023-10-03

## 2023-10-03 RX ORDER — ATORVASTATIN CALCIUM 80 MG/1
80 TABLET, FILM COATED ORAL NIGHTLY
Status: DISCONTINUED | OUTPATIENT
Start: 2023-10-03 | End: 2023-10-05 | Stop reason: HOSPADM

## 2023-10-03 RX ORDER — NITROGLYCERIN 0.4 MG/1
0.4 TABLET SUBLINGUAL EVERY 5 MIN PRN
Status: DISCONTINUED | OUTPATIENT
Start: 2023-10-03 | End: 2023-10-05 | Stop reason: HOSPADM

## 2023-10-03 RX ORDER — CARVEDILOL 3.12 MG/1
3.12 TABLET ORAL 2 TIMES DAILY
Status: DISCONTINUED | OUTPATIENT
Start: 2023-10-03 | End: 2023-10-05 | Stop reason: HOSPADM

## 2023-10-03 RX ORDER — ASPIRIN 81 MG/1
324 TABLET, CHEWABLE ORAL ONCE
Status: COMPLETED | OUTPATIENT
Start: 2023-10-03 | End: 2023-10-03

## 2023-10-03 RX ORDER — SODIUM CHLORIDE 9 MG/ML
INJECTION, SOLUTION INTRAVENOUS PRN
Status: DISCONTINUED | OUTPATIENT
Start: 2023-10-03 | End: 2023-10-05 | Stop reason: HOSPADM

## 2023-10-03 RX ORDER — SODIUM BICARBONATE 650 MG/1
1300 TABLET ORAL 3 TIMES DAILY
Status: DISCONTINUED | OUTPATIENT
Start: 2023-10-03 | End: 2023-10-05 | Stop reason: HOSPADM

## 2023-10-03 RX ORDER — ASPIRIN 81 MG/1
81 TABLET, CHEWABLE ORAL DAILY
Status: DISCONTINUED | OUTPATIENT
Start: 2023-10-03 | End: 2023-10-05 | Stop reason: HOSPADM

## 2023-10-03 RX ORDER — INSULIN LISPRO 100 [IU]/ML
0-4 INJECTION, SOLUTION INTRAVENOUS; SUBCUTANEOUS NIGHTLY
Status: DISCONTINUED | OUTPATIENT
Start: 2023-10-03 | End: 2023-10-05 | Stop reason: HOSPADM

## 2023-10-03 RX ORDER — INSULIN GLARGINE 100 [IU]/ML
50 INJECTION, SOLUTION SUBCUTANEOUS 2 TIMES DAILY
Status: DISCONTINUED | OUTPATIENT
Start: 2023-10-03 | End: 2023-10-04

## 2023-10-03 RX ORDER — ALLOPURINOL 100 MG/1
100 TABLET ORAL DAILY
Status: DISCONTINUED | OUTPATIENT
Start: 2023-10-03 | End: 2023-10-03

## 2023-10-03 RX ORDER — VENLAFAXINE HYDROCHLORIDE 75 MG/1
75 CAPSULE, EXTENDED RELEASE ORAL
Status: DISCONTINUED | OUTPATIENT
Start: 2023-10-04 | End: 2023-10-05 | Stop reason: HOSPADM

## 2023-10-03 RX ADMIN — ASPIRIN 324 MG: 81 TABLET, CHEWABLE ORAL at 15:55

## 2023-10-03 RX ADMIN — INSULIN GLARGINE 50 UNITS: 100 INJECTION, SOLUTION SUBCUTANEOUS at 22:07

## 2023-10-03 RX ADMIN — BUMETANIDE 2 MG: 1 TABLET ORAL at 22:05

## 2023-10-03 RX ADMIN — BUSPIRONE HYDROCHLORIDE 10 MG: 10 TABLET ORAL at 22:05

## 2023-10-03 RX ADMIN — INSULIN LISPRO 4 UNITS: 100 INJECTION, SOLUTION INTRAVENOUS; SUBCUTANEOUS at 22:07

## 2023-10-03 RX ADMIN — CARVEDILOL 3.12 MG: 3.12 TABLET, FILM COATED ORAL at 22:09

## 2023-10-03 RX ADMIN — TICAGRELOR 90 MG: 90 TABLET ORAL at 22:05

## 2023-10-03 RX ADMIN — SODIUM BICARBONATE 1300 MG: 650 TABLET ORAL at 22:06

## 2023-10-03 RX ADMIN — HEPARIN SODIUM 5000 UNITS: 5000 INJECTION INTRAVENOUS; SUBCUTANEOUS at 22:08

## 2023-10-03 RX ADMIN — ATORVASTATIN CALCIUM 80 MG: 80 TABLET, FILM COATED ORAL at 22:05

## 2023-10-03 RX ADMIN — GABAPENTIN 100 MG: 100 CAPSULE ORAL at 22:05

## 2023-10-03 RX ADMIN — INSULIN LISPRO 10 UNITS: 100 INJECTION, SOLUTION INTRAVENOUS; SUBCUTANEOUS at 15:32

## 2023-10-03 RX ADMIN — SODIUM CHLORIDE, PRESERVATIVE FREE 10 ML: 5 INJECTION INTRAVENOUS at 22:08

## 2023-10-03 RX ADMIN — BUSPIRONE HYDROCHLORIDE 10 MG: 10 TABLET ORAL at 18:42

## 2023-10-03 ASSESSMENT — ENCOUNTER SYMPTOMS
COUGH: 0
NAUSEA: 0
ABDOMINAL PAIN: 0
SHORTNESS OF BREATH: 1
BLOOD IN STOOL: 0
VOMITING: 0
WHEEZING: 0

## 2023-10-03 ASSESSMENT — PAIN SCALES - GENERAL: PAINLEVEL_OUTOF10: 8

## 2023-10-03 ASSESSMENT — PAIN - FUNCTIONAL ASSESSMENT: PAIN_FUNCTIONAL_ASSESSMENT: 0-10

## 2023-10-03 ASSESSMENT — LIFESTYLE VARIABLES
HOW MANY STANDARD DRINKS CONTAINING ALCOHOL DO YOU HAVE ON A TYPICAL DAY: PATIENT DOES NOT DRINK
HOW OFTEN DO YOU HAVE A DRINK CONTAINING ALCOHOL: NEVER
HOW MANY STANDARD DRINKS CONTAINING ALCOHOL DO YOU HAVE ON A TYPICAL DAY: PATIENT DOES NOT DRINK
HOW OFTEN DO YOU HAVE A DRINK CONTAINING ALCOHOL: NEVER

## 2023-10-03 ASSESSMENT — HEART SCORE: ECG: 0

## 2023-10-03 NOTE — ED PROVIDER NOTES
[facility],[address] Floor ED  Emergency Department Encounter  Emergency Medicine Resident     Pt Name:Estefany Ro  MRN: [id number]  [address] [facility], [place], [facility] 1960  Date of evaluation: 10/3/23  PCP:  AFSANEH Brantley CNP  Note Started: 3:31 PM EDT      CHIEF COMPLAINT       Chief Complaint   Patient presents with    Chest Pain       HISTORY OF PRESENT ILLNESS  (Location/Symptom, Timing/Onset, Context/Setting, Quality, Duration, Modifying Factors, Severity.)      Timo Christopher is a 70 y.o. female who presents with ongoing chest pain and associated shortness of breath since her dialysis appointment last evening. Patient states the pain radiates into her back, left shoulder, and jaw. Patient does have history of unstable angina, status post CABG, status post PCI, ESRD - on MWF dialysis, last dialyzed yesterday with no complication, IDDM, and history of previous DVT. Patient states she has this chronic chest pain on and off for the past year or so. She denies hemoptysis, states that the pain has been intermittent. Does endorse left lower limb tenderness and edema however states that this is chronic. Denies any wounds. Does endorse good compliance with her medications including her insulin regimen. Patient did have a previous cardiac work-up with stress test back in August showing large inferior wall infarct with no significant stalin-infarct ischemia, with no further cardiac work-up necessary.     PAST MEDICAL / SURGICAL / SOCIAL / FAMILY HISTORY      has a past medical history of Arthritis, Backache, unspecified, CAD (coronary artery disease), Cerebral artery occlusion with cerebral infarction ([address]), CHF (congestive heart failure) ([address]), Chronic kidney disease, Coronary atherosclerosis of artery bypass graft, COVID, Cramp of limb, Gallstones, Hemodialysis patient ([address]), Hyperlipidemia, Hypertension, Insomnia, Neuromuscular disorder ([address]), Pneumonia, Psychiatric problem, Thyroid disease, Type II or unspecified type

## 2023-10-03 NOTE — TELEPHONE ENCOUNTER
Patient now being seen by Primary Care Physician for Diabetes medication Management  Will discharge from our clinic

## 2023-10-03 NOTE — ED TRIAGE NOTES
Mode of arrival (squad #, walk in, police, etc) : Walk in        Chief complaint(s): Chest pain        Arrival Note (brief scenario, treatment PTA, etc). : Pt arrives to ED c/o chest pain that started last night. Patient reports  history of bypass surgery at [facility] in 2005. Patient is a M/W/F dialysis patient and had a full treatment yesterday. Patient is on cardiac monitor and continuous pulse oximetry.

## 2023-10-04 ENCOUNTER — APPOINTMENT (OUTPATIENT)
Age: 63
End: 2023-10-04
Attending: INTERNAL MEDICINE
Payer: COMMERCIAL

## 2023-10-04 LAB
ALBUMIN SERPL-MCNC: 3.2 G/DL (ref 3.5–5.2)
ALP SERPL-CCNC: 120 U/L (ref 35–104)
ALT SERPL-CCNC: 21 U/L (ref 5–33)
ANION GAP SERPL CALCULATED.3IONS-SCNC: 16 MMOL/L (ref 9–17)
AST SERPL-CCNC: 35 U/L
BASOPHILS # BLD: 0 K/UL (ref 0–0.2)
BASOPHILS NFR BLD: 1 % (ref 0–2)
BILIRUB DIRECT SERPL-MCNC: 0.2 MG/DL
BILIRUB INDIRECT SERPL-MCNC: 0.6 MG/DL (ref 0–1)
BILIRUB SERPL-MCNC: 0.8 MG/DL (ref 0.3–1.2)
BUN SERPL-MCNC: 38 MG/DL (ref 8–23)
CALCIUM SERPL-MCNC: 9 MG/DL (ref 8.6–10.4)
CHLORIDE SERPL-SCNC: 97 MMOL/L (ref 98–107)
CO2 SERPL-SCNC: 26 MMOL/L (ref 20–31)
CREAT SERPL-MCNC: 3.5 MG/DL (ref 0.5–0.9)
ECHO AO ROOT DIAM: 2.6 CM
ECHO AO ROOT INDEX: 1.26 CM/M2
ECHO AV AREA PEAK VELOCITY: 2 CM2
ECHO AV AREA VTI: 2 CM2
ECHO AV AREA/BSA PEAK VELOCITY: 1 CM2/M2
ECHO AV AREA/BSA VTI: 1 CM2/M2
ECHO AV MEAN GRADIENT: 3 MMHG
ECHO AV MEAN VELOCITY: 0.9 M/S
ECHO AV PEAK GRADIENT: 6 MMHG
ECHO AV PEAK VELOCITY: 1.2 M/S
ECHO AV VELOCITY RATIO: 0.67
ECHO AV VTI: 30.2 CM
ECHO BSA: 2.17 M2
ECHO EST RA PRESSURE: 8 MMHG
ECHO LA AREA 2C: 18.8 CM2
ECHO LA AREA 4C: 18.1 CM2
ECHO LA DIAMETER INDEX: 1.84 CM/M2
ECHO LA DIAMETER: 3.8 CM
ECHO LA MAJOR AXIS: 5.7 CM
ECHO LA MINOR AXIS: 6 CM
ECHO LA TO AORTIC ROOT RATIO: 1.46
ECHO LA VOL 2C: 49 ML (ref 22–52)
ECHO LA VOL 4C: 47 ML (ref 22–52)
ECHO LA VOL BP: 49 ML (ref 22–52)
ECHO LA VOL/BSA BIPLANE: 24 ML/M2 (ref 16–34)
ECHO LA VOLUME INDEX A2C: 24 ML/M2 (ref 16–34)
ECHO LA VOLUME INDEX A4C: 23 ML/M2 (ref 16–34)
ECHO LV E' LATERAL VELOCITY: 5 CM/S
ECHO LV E' SEPTAL VELOCITY: 7 CM/S
ECHO LV FRACTIONAL SHORTENING: 30 % (ref 28–44)
ECHO LV INTERNAL DIMENSION DIASTOLE INDEX: 2.61 CM/M2
ECHO LV INTERNAL DIMENSION DIASTOLIC: 5.4 CM (ref 3.9–5.3)
ECHO LV INTERNAL DIMENSION SYSTOLIC INDEX: 1.84 CM/M2
ECHO LV INTERNAL DIMENSION SYSTOLIC: 3.8 CM
ECHO LV IVSD: 0.9 CM (ref 0.6–0.9)
ECHO LV MASS 2D: 180.1 G (ref 67–162)
ECHO LV MASS INDEX 2D: 87 G/M2 (ref 43–95)
ECHO LV POSTERIOR WALL DIASTOLIC: 0.9 CM (ref 0.6–0.9)
ECHO LV RELATIVE WALL THICKNESS RATIO: 0.33
ECHO LVOT AREA: 3.1 CM2
ECHO LVOT AV VTI INDEX: 0.63
ECHO LVOT DIAM: 2 CM
ECHO LVOT MEAN GRADIENT: 1 MMHG
ECHO LVOT PEAK GRADIENT: 2 MMHG
ECHO LVOT PEAK VELOCITY: 0.8 M/S
ECHO LVOT STROKE VOLUME INDEX: 28.7 ML/M2
ECHO LVOT SV: 59.3 ML
ECHO LVOT VTI: 18.9 CM
ECHO MV A VELOCITY: 0.84 M/S
ECHO MV AREA VTI: 1.7 CM2
ECHO MV E DECELERATION TIME (DT): 173 MS
ECHO MV E VELOCITY: 0.85 M/S
ECHO MV E/A RATIO: 1.01
ECHO MV E/E' LATERAL: 17
ECHO MV E/E' RATIO (AVERAGED): 14.57
ECHO MV E/E' SEPTAL: 12.14
ECHO MV LVOT VTI INDEX: 1.8
ECHO MV MAX VELOCITY: 1.1 M/S
ECHO MV MEAN GRADIENT: 2 MMHG
ECHO MV MEAN VELOCITY: 0.6 M/S
ECHO MV PEAK GRADIENT: 5 MMHG
ECHO MV VTI: 34 CM
ECHO RA AREA 4C: 15.9 CM2
ECHO RA END SYSTOLIC VOLUME APICAL 4 CHAMBER INDEX BSA: 21 ML/M2
ECHO RA VOLUME: 43 ML
ECHO RIGHT VENTRICULAR SYSTOLIC PRESSURE (RVSP): 11 MMHG
ECHO RV TAPSE: 1 CM (ref 1.7–?)
ECHO TV REGURGITANT MAX VELOCITY: 0.91 M/S
ECHO TV REGURGITANT PEAK GRADIENT: 3 MMHG
EKG ATRIAL RATE: 70 BPM
EKG P AXIS: 46 DEGREES
EKG P-R INTERVAL: 162 MS
EKG Q-T INTERVAL: 448 MS
EKG QRS DURATION: 96 MS
EKG QTC CALCULATION (BAZETT): 483 MS
EKG R AXIS: 17 DEGREES
EKG T AXIS: 159 DEGREES
EKG VENTRICULAR RATE: 70 BPM
EOSINOPHIL # BLD: 0.2 K/UL (ref 0–0.4)
EOSINOPHILS RELATIVE PERCENT: 2 % (ref 0–4)
ERYTHROCYTE [DISTWIDTH] IN BLOOD BY AUTOMATED COUNT: 15.8 % (ref 11.5–14.9)
GFR SERPL CREATININE-BSD FRML MDRD: 14 ML/MIN/1.73M2
GLUCOSE BLD-MCNC: 159 MG/DL (ref 65–105)
GLUCOSE BLD-MCNC: 248 MG/DL (ref 65–105)
GLUCOSE BLD-MCNC: 310 MG/DL (ref 65–105)
GLUCOSE BLD-MCNC: 346 MG/DL (ref 65–105)
GLUCOSE SERPL-MCNC: 355 MG/DL (ref 70–99)
HCT VFR BLD AUTO: 30.4 % (ref 36–46)
HGB BLD-MCNC: 10.4 G/DL (ref 12–16)
LYMPHOCYTES NFR BLD: 1.5 K/UL (ref 1–4.8)
LYMPHOCYTES RELATIVE PERCENT: 24 % (ref 24–44)
MAGNESIUM SERPL-MCNC: 2.3 MG/DL (ref 1.6–2.6)
MCH RBC QN AUTO: 35 PG (ref 26–34)
MCHC RBC AUTO-ENTMCNC: 34.1 G/DL (ref 31–37)
MCV RBC AUTO: 102.7 FL (ref 80–100)
MONOCYTES NFR BLD: 0.6 K/UL (ref 0.1–1.3)
MONOCYTES NFR BLD: 9 % (ref 1–7)
NEUTROPHILS NFR BLD: 64 % (ref 36–66)
NEUTS SEG NFR BLD: 4.1 K/UL (ref 1.3–9.1)
PHOSPHATE SERPL-MCNC: 4.2 MG/DL (ref 2.6–4.5)
PLATELET # BLD AUTO: 167 K/UL (ref 150–450)
PMV BLD AUTO: 8.2 FL (ref 6–12)
POTASSIUM SERPL-SCNC: 3.4 MMOL/L (ref 3.7–5.3)
PROT SERPL-MCNC: 6.5 G/DL (ref 6.4–8.3)
RBC # BLD AUTO: 2.96 M/UL (ref 4–5.2)
SODIUM SERPL-SCNC: 139 MMOL/L (ref 135–144)
TROPONIN I SERPL HS-MCNC: 96 NG/L (ref 0–14)
WBC OTHER # BLD: 6.4 K/UL (ref 3.5–11)

## 2023-10-04 PROCEDURE — 99223 1ST HOSP IP/OBS HIGH 75: CPT | Performed by: STUDENT IN AN ORGANIZED HEALTH CARE EDUCATION/TRAINING PROGRAM

## 2023-10-04 PROCEDURE — 93306 TTE W/DOPPLER COMPLETE: CPT

## 2023-10-04 PROCEDURE — 93306 TTE W/DOPPLER COMPLETE: CPT | Performed by: INTERNAL MEDICINE

## 2023-10-04 PROCEDURE — 84484 ASSAY OF TROPONIN QUANT: CPT

## 2023-10-04 PROCEDURE — 99223 1ST HOSP IP/OBS HIGH 75: CPT | Performed by: INTERNAL MEDICINE

## 2023-10-04 PROCEDURE — 36415 COLL VENOUS BLD VENIPUNCTURE: CPT

## 2023-10-04 PROCEDURE — 85025 COMPLETE CBC W/AUTO DIFF WBC: CPT

## 2023-10-04 PROCEDURE — 6360000002 HC RX W HCPCS: Performed by: STUDENT IN AN ORGANIZED HEALTH CARE EDUCATION/TRAINING PROGRAM

## 2023-10-04 PROCEDURE — 2060000000 HC ICU INTERMEDIATE R&B

## 2023-10-04 PROCEDURE — 90935 HEMODIALYSIS ONE EVALUATION: CPT

## 2023-10-04 PROCEDURE — 84100 ASSAY OF PHOSPHORUS: CPT

## 2023-10-04 PROCEDURE — 6370000000 HC RX 637 (ALT 250 FOR IP): Performed by: STUDENT IN AN ORGANIZED HEALTH CARE EDUCATION/TRAINING PROGRAM

## 2023-10-04 PROCEDURE — 83735 ASSAY OF MAGNESIUM: CPT

## 2023-10-04 PROCEDURE — 82947 ASSAY GLUCOSE BLOOD QUANT: CPT

## 2023-10-04 PROCEDURE — 6370000000 HC RX 637 (ALT 250 FOR IP): Performed by: INTERNAL MEDICINE

## 2023-10-04 PROCEDURE — 93010 ELECTROCARDIOGRAM REPORT: CPT | Performed by: INTERNAL MEDICINE

## 2023-10-04 PROCEDURE — 80076 HEPATIC FUNCTION PANEL: CPT

## 2023-10-04 PROCEDURE — 2580000003 HC RX 258: Performed by: STUDENT IN AN ORGANIZED HEALTH CARE EDUCATION/TRAINING PROGRAM

## 2023-10-04 PROCEDURE — 80048 BASIC METABOLIC PNL TOTAL CA: CPT

## 2023-10-04 RX ORDER — POTASSIUM CHLORIDE 20 MEQ/1
10 TABLET, EXTENDED RELEASE ORAL ONCE
Status: COMPLETED | OUTPATIENT
Start: 2023-10-04 | End: 2023-10-04

## 2023-10-04 RX ORDER — INSULIN GLARGINE 100 [IU]/ML
57 INJECTION, SOLUTION SUBCUTANEOUS 2 TIMES DAILY
Status: DISCONTINUED | OUTPATIENT
Start: 2023-10-05 | End: 2023-10-05

## 2023-10-04 RX ORDER — PRASUGREL 10 MG/1
60 TABLET, FILM COATED ORAL ONCE
Status: COMPLETED | OUTPATIENT
Start: 2023-10-04 | End: 2023-10-04

## 2023-10-04 RX ORDER — PRASUGREL 10 MG/1
10 TABLET, FILM COATED ORAL DAILY
Status: DISCONTINUED | OUTPATIENT
Start: 2023-10-05 | End: 2023-10-05 | Stop reason: HOSPADM

## 2023-10-04 RX ORDER — INSULIN GLARGINE 100 [IU]/ML
55 INJECTION, SOLUTION SUBCUTANEOUS 2 TIMES DAILY
Status: DISCONTINUED | OUTPATIENT
Start: 2023-10-04 | End: 2023-10-04

## 2023-10-04 RX ORDER — NITROGLYCERIN 0.4 MG/1
0.4 TABLET SUBLINGUAL ONCE
Status: COMPLETED | OUTPATIENT
Start: 2023-10-04 | End: 2023-10-04

## 2023-10-04 RX ADMIN — BUSPIRONE HYDROCHLORIDE 10 MG: 10 TABLET ORAL at 15:37

## 2023-10-04 RX ADMIN — SODIUM CHLORIDE, PRESERVATIVE FREE 10 ML: 5 INJECTION INTRAVENOUS at 07:37

## 2023-10-04 RX ADMIN — Medication 3 MG: at 00:11

## 2023-10-04 RX ADMIN — SODIUM BICARBONATE 1300 MG: 650 TABLET ORAL at 15:32

## 2023-10-04 RX ADMIN — HEPARIN SODIUM 5000 UNITS: 5000 INJECTION INTRAVENOUS; SUBCUTANEOUS at 22:17

## 2023-10-04 RX ADMIN — POLYETHYLENE GLYCOL 3350 17 G: 17 POWDER, FOR SOLUTION ORAL at 00:11

## 2023-10-04 RX ADMIN — ATORVASTATIN CALCIUM 80 MG: 80 TABLET, FILM COATED ORAL at 20:40

## 2023-10-04 RX ADMIN — SODIUM BICARBONATE 1300 MG: 650 TABLET ORAL at 20:41

## 2023-10-04 RX ADMIN — BUMETANIDE 2 MG: 1 TABLET ORAL at 20:41

## 2023-10-04 RX ADMIN — INSULIN LISPRO 12 UNITS: 100 INJECTION, SOLUTION INTRAVENOUS; SUBCUTANEOUS at 07:35

## 2023-10-04 RX ADMIN — POTASSIUM CHLORIDE 10 MEQ: 1500 TABLET, EXTENDED RELEASE ORAL at 10:08

## 2023-10-04 RX ADMIN — CARVEDILOL 3.12 MG: 3.12 TABLET, FILM COATED ORAL at 20:43

## 2023-10-04 RX ADMIN — NITROGLYCERIN 0.4 MG: 0.4 TABLET, ORALLY DISINTEGRATING SUBLINGUAL at 19:15

## 2023-10-04 RX ADMIN — HEPARIN SODIUM 5000 UNITS: 5000 INJECTION INTRAVENOUS; SUBCUTANEOUS at 05:48

## 2023-10-04 RX ADMIN — INSULIN GLARGINE 50 UNITS: 100 INJECTION, SOLUTION SUBCUTANEOUS at 07:35

## 2023-10-04 RX ADMIN — ALLOPURINOL 50 MG: 100 TABLET ORAL at 15:38

## 2023-10-04 RX ADMIN — PRASUGREL 60 MG: 10 TABLET, FILM COATED ORAL at 15:32

## 2023-10-04 RX ADMIN — INSULIN LISPRO 4 UNITS: 100 INJECTION, SOLUTION INTRAVENOUS; SUBCUTANEOUS at 20:41

## 2023-10-04 RX ADMIN — BUSPIRONE HYDROCHLORIDE 10 MG: 10 TABLET ORAL at 20:41

## 2023-10-04 RX ADMIN — INSULIN LISPRO 8 UNITS: 100 INJECTION, SOLUTION INTRAVENOUS; SUBCUTANEOUS at 17:49

## 2023-10-04 RX ADMIN — BUMETANIDE 2 MG: 1 TABLET ORAL at 15:37

## 2023-10-04 RX ADMIN — HEPARIN SODIUM 5000 UNITS: 5000 INJECTION INTRAVENOUS; SUBCUTANEOUS at 17:49

## 2023-10-04 RX ADMIN — SODIUM CHLORIDE, PRESERVATIVE FREE 10 ML: 5 INJECTION INTRAVENOUS at 20:42

## 2023-10-04 RX ADMIN — ASPIRIN 81 MG: 81 TABLET, CHEWABLE ORAL at 15:32

## 2023-10-04 RX ADMIN — GABAPENTIN 100 MG: 100 CAPSULE ORAL at 20:41

## 2023-10-04 RX ADMIN — VENLAFAXINE HYDROCHLORIDE 75 MG: 75 CAPSULE, EXTENDED RELEASE ORAL at 15:32

## 2023-10-04 RX ADMIN — INSULIN GLARGINE 55 UNITS: 100 INJECTION, SOLUTION SUBCUTANEOUS at 20:42

## 2023-10-04 ASSESSMENT — ENCOUNTER SYMPTOMS
ABDOMINAL PAIN: 0
SHORTNESS OF BREATH: 1
VOMITING: 0
NAUSEA: 0

## 2023-10-04 NOTE — DISCHARGE INSTR - COC
Risk Assessment Score:  Readmission Risk              Risk of Unplanned Readmission:  47           Discharging to Facility/ [facility] [address]. Karen Fisher, [place]  Phone:  [phone]  Fax:  [phone]     Dialysis Facility (if applicable)   Name: [facility]  Address:  Dialysis Schedule:M/W/F @ 10:15  Phone:  Fax:    / signature: Electronically signed by Isaura Gale RN on 10/4/23 at 11:02 AM EDT    PHYSICIAN SECTION    Prognosis: Guarded    Condition at Discharge: Stable    Rehab Potential (if transferring to Rehab): Guarded    Recommended Labs or Other Treatments After Discharge:     Physician Certification: I certify the above information and transfer of Robert Juan  is necessary for the continuing treatment of the diagnosis listed and that she requires Home Care for greater 30 days.      Update Admission H&P: No change in H&P    PHYSICIAN SIGNATURE:  Electronically signed by Aryan Rosenbaum MD on 10/5/23 at 12:51 PM EDT

## 2023-10-04 NOTE — H&P
infiltration, CHF or pneumothorax. Patient has had prior lower cervical fusion. Bony structures are otherwise unremarkable. No active cardiopulmonary disease. No change from prior examination.            Keary Aschoff, MD  10/4/2023 12:51 PM

## 2023-10-04 NOTE — FLOWSHEET NOTE
10/03/23 2238   Treatment Team Notification   Reason for Communication Critical results   Type of Critical Result Cardiology   Name of Team Member Notified Dr. Manuela Sommer Provider   Method of Communication Call   Response No new orders   Notification Time [phone]       RN consulted Nicholas Joe of Patient's Chest pain that radiates into her back, left shoulder and Jaw, Troponin 112 repeat 110 and BNP 1,114. EKG showed NSR. No new orders.

## 2023-10-04 NOTE — ACP (ADVANCE CARE PLANNING)
Advance Care Planning     Advance Care Planning Activator (Inpatient)  Conversation Note      Date of ACP Conversation: 10/4/2023     Conversation Conducted with: Patient with Decision Making Capacity    ACP Activator: Mónica Gonzales RN        Health Care Decision Maker:     Current Designated Health Care Decision Maker:     Primary Decision Maker: Chuyita Ordonez - Brother/Sister - [phone]    Primary Decision Maker: Jose Miller - Brother/Sister - [phone]        Care Preferences    Ventilation: \"If you were in your present state of health and suddenly became very ill and were unable to breathe on your own, what would your preference be about the use of a ventilator (breathing machine) if it were available to you? \"      Would the patient desire the use of ventilator (breathing machine)?: no    \"If your health worsens and it becomes clear that your chance of recovery is unlikely, what would your preference be about the use of a ventilator (breathing machine) if it were available to you? \"     Would the patient desire the use of ventilator (breathing machine)?: No      Resuscitation  \"CPR works best to restart the heart when there is a sudden event, like a heart attack, in someone who is otherwise healthy. Unfortunately, CPR does not typically restart the heart for people who have serious health conditions or who are very sick. \"    \"In the event your heart stopped as a result of an underlying serious health condition, would you want attempts to be made to restart your heart (answer \"yes\" for attempt to resuscitate) or would you prefer a natural death (answer \"no\" for do not attempt to resuscitate)? \" yes       [] Yes   [] No   Educated Patient / Amalia Jackson regarding differences between Advance Directives and portable DNR orders.     Length of ACP Conversation in minutes:      Conversation Outcomes:  ACP discussion completed    Follow-up plan:    [] Schedule follow-up conversation to continue

## 2023-10-05 VITALS
HEART RATE: 70 BPM | SYSTOLIC BLOOD PRESSURE: 117 MMHG | DIASTOLIC BLOOD PRESSURE: 81 MMHG | HEIGHT: 65 IN | WEIGHT: 223.11 LBS | TEMPERATURE: 97.7 F | RESPIRATION RATE: 18 BRPM | BODY MASS INDEX: 37.17 KG/M2 | OXYGEN SATURATION: 96 %

## 2023-10-05 LAB
ANION GAP SERPL CALCULATED.3IONS-SCNC: 17 MMOL/L (ref 9–17)
BASOPHILS # BLD: 0 K/UL (ref 0–0.2)
BASOPHILS NFR BLD: 1 % (ref 0–2)
BUN SERPL-MCNC: 25 MG/DL (ref 8–23)
CALCIUM SERPL-MCNC: 9.3 MG/DL (ref 8.6–10.4)
CHLORIDE SERPL-SCNC: 97 MMOL/L (ref 98–107)
CO2 SERPL-SCNC: 24 MMOL/L (ref 20–31)
CREAT SERPL-MCNC: 2.6 MG/DL (ref 0.5–0.9)
EOSINOPHIL # BLD: 0.2 K/UL (ref 0–0.4)
EOSINOPHILS RELATIVE PERCENT: 3 % (ref 0–4)
ERYTHROCYTE [DISTWIDTH] IN BLOOD BY AUTOMATED COUNT: 15.5 % (ref 11.5–14.9)
GFR SERPL CREATININE-BSD FRML MDRD: 20 ML/MIN/1.73M2
GLUCOSE BLD-MCNC: 472 MG/DL (ref 65–105)
GLUCOSE BLD-MCNC: 514 MG/DL (ref 65–105)
GLUCOSE SERPL-MCNC: 375 MG/DL (ref 70–99)
HCT VFR BLD AUTO: 31.7 % (ref 36–46)
HGB BLD-MCNC: 10.7 G/DL (ref 12–16)
LYMPHOCYTES NFR BLD: 1.5 K/UL (ref 1–4.8)
LYMPHOCYTES RELATIVE PERCENT: 27 % (ref 24–44)
MCH RBC QN AUTO: 34.4 PG (ref 26–34)
MCHC RBC AUTO-ENTMCNC: 33.6 G/DL (ref 31–37)
MCV RBC AUTO: 102.4 FL (ref 80–100)
MONOCYTES NFR BLD: 0.4 K/UL (ref 0.1–1.3)
MONOCYTES NFR BLD: 7 % (ref 1–7)
NEUTROPHILS NFR BLD: 62 % (ref 36–66)
NEUTS SEG NFR BLD: 3.4 K/UL (ref 1.3–9.1)
PLATELET # BLD AUTO: 162 K/UL (ref 150–450)
PMV BLD AUTO: 8.3 FL (ref 6–12)
POTASSIUM SERPL-SCNC: 4 MMOL/L (ref 3.7–5.3)
RBC # BLD AUTO: 3.1 M/UL (ref 4–5.2)
SODIUM SERPL-SCNC: 138 MMOL/L (ref 135–144)
WBC OTHER # BLD: 5.5 K/UL (ref 3.5–11)

## 2023-10-05 PROCEDURE — G0378 HOSPITAL OBSERVATION PER HR: HCPCS

## 2023-10-05 PROCEDURE — 80048 BASIC METABOLIC PNL TOTAL CA: CPT

## 2023-10-05 PROCEDURE — 97166 OT EVAL MOD COMPLEX 45 MIN: CPT

## 2023-10-05 PROCEDURE — 6370000000 HC RX 637 (ALT 250 FOR IP): Performed by: INTERNAL MEDICINE

## 2023-10-05 PROCEDURE — 99233 SBSQ HOSP IP/OBS HIGH 50: CPT | Performed by: INTERNAL MEDICINE

## 2023-10-05 PROCEDURE — 99222 1ST HOSP IP/OBS MODERATE 55: CPT | Performed by: PSYCHIATRY & NEUROLOGY

## 2023-10-05 PROCEDURE — 97535 SELF CARE MNGMENT TRAINING: CPT

## 2023-10-05 PROCEDURE — 82947 ASSAY GLUCOSE BLOOD QUANT: CPT

## 2023-10-05 PROCEDURE — 6360000002 HC RX W HCPCS: Performed by: STUDENT IN AN ORGANIZED HEALTH CARE EDUCATION/TRAINING PROGRAM

## 2023-10-05 PROCEDURE — 6370000000 HC RX 637 (ALT 250 FOR IP): Performed by: STUDENT IN AN ORGANIZED HEALTH CARE EDUCATION/TRAINING PROGRAM

## 2023-10-05 PROCEDURE — 85025 COMPLETE CBC W/AUTO DIFF WBC: CPT

## 2023-10-05 PROCEDURE — 36415 COLL VENOUS BLD VENIPUNCTURE: CPT

## 2023-10-05 PROCEDURE — 2580000003 HC RX 258: Performed by: STUDENT IN AN ORGANIZED HEALTH CARE EDUCATION/TRAINING PROGRAM

## 2023-10-05 RX ORDER — PRASUGREL 10 MG/1
10 TABLET, FILM COATED ORAL DAILY
Qty: 90 TABLET | Refills: 3 | Status: SHIPPED | OUTPATIENT
Start: 2023-10-06

## 2023-10-05 RX ORDER — AMLODIPINE BESYLATE 2.5 MG/1
2.5 TABLET ORAL DAILY
Status: DISCONTINUED | OUTPATIENT
Start: 2023-10-05 | End: 2023-10-05 | Stop reason: HOSPADM

## 2023-10-05 RX ORDER — NITROGLYCERIN 0.4 MG/1
0.4 TABLET SUBLINGUAL EVERY 5 MIN PRN
Qty: 25 TABLET | Refills: 3 | Status: SHIPPED | OUTPATIENT
Start: 2023-10-05

## 2023-10-05 RX ORDER — INSULIN GLARGINE 100 [IU]/ML
62 INJECTION, SOLUTION SUBCUTANEOUS 2 TIMES DAILY
Status: DISCONTINUED | OUTPATIENT
Start: 2023-10-05 | End: 2023-10-05 | Stop reason: HOSPADM

## 2023-10-05 RX ADMIN — AMLODIPINE BESYLATE 2.5 MG: 2.5 TABLET ORAL at 11:04

## 2023-10-05 RX ADMIN — HEPARIN SODIUM 5000 UNITS: 5000 INJECTION INTRAVENOUS; SUBCUTANEOUS at 05:53

## 2023-10-05 RX ADMIN — BUSPIRONE HYDROCHLORIDE 10 MG: 10 TABLET ORAL at 10:44

## 2023-10-05 RX ADMIN — PRASUGREL 10 MG: 10 TABLET, FILM COATED ORAL at 10:42

## 2023-10-05 RX ADMIN — INSULIN LISPRO 16 UNITS: 100 INJECTION, SOLUTION INTRAVENOUS; SUBCUTANEOUS at 10:43

## 2023-10-05 RX ADMIN — SODIUM BICARBONATE 1300 MG: 650 TABLET ORAL at 14:20

## 2023-10-05 RX ADMIN — SODIUM CHLORIDE, PRESERVATIVE FREE 10 ML: 5 INJECTION INTRAVENOUS at 10:42

## 2023-10-05 RX ADMIN — CARVEDILOL 3.12 MG: 3.12 TABLET, FILM COATED ORAL at 10:44

## 2023-10-05 RX ADMIN — INSULIN GLARGINE 62 UNITS: 100 INJECTION, SOLUTION SUBCUTANEOUS at 10:42

## 2023-10-05 RX ADMIN — VENLAFAXINE HYDROCHLORIDE 75 MG: 75 CAPSULE, EXTENDED RELEASE ORAL at 10:44

## 2023-10-05 RX ADMIN — BUMETANIDE 2 MG: 1 TABLET ORAL at 10:43

## 2023-10-05 RX ADMIN — INSULIN LISPRO 16 UNITS: 100 INJECTION, SOLUTION INTRAVENOUS; SUBCUTANEOUS at 17:44

## 2023-10-05 RX ADMIN — BUSPIRONE HYDROCHLORIDE 10 MG: 10 TABLET ORAL at 14:20

## 2023-10-05 RX ADMIN — SODIUM BICARBONATE 1300 MG: 650 TABLET ORAL at 10:43

## 2023-10-05 RX ADMIN — INSULIN LISPRO 16 UNITS: 100 INJECTION, SOLUTION INTRAVENOUS; SUBCUTANEOUS at 14:20

## 2023-10-05 RX ADMIN — ASPIRIN 81 MG: 81 TABLET, CHEWABLE ORAL at 10:43

## 2023-10-05 RX ADMIN — ACETAMINOPHEN 650 MG: 325 TABLET ORAL at 10:59

## 2023-10-05 ASSESSMENT — PAIN SCALES - GENERAL: PAINLEVEL_OUTOF10: 8

## 2023-10-05 NOTE — DISCHARGE INSTR - DIET

## 2023-10-05 NOTE — CONSULTS
[facility]  In Patient Cardiology Consult             Date:   10/4/2023  Patient name: Susan Weems  Date of admission:  10/3/2023  3:06 PM  MRN:   [id number]  YOB: 1960    Reason for Admission: Elevated troponin    CHIEF COMPLAINT: Worsening shortness of breath    History Obtained From: Patient and chart    HISTORY OF PRESENT ILLNESS:    This is a 70year old female with history as below. Presents due to worsening SOB/IVEY. Also had back pain that radiated to her shoulder and jaw. No CP now. Denies any orthopnea, pnd, le edema, palpitations. Had recent stress test 8/23.      Past Medical History:    Past Medical History:   Diagnosis Date    Arthritis     Backache, unspecified     CAD (coronary artery disease)     Cerebral artery occlusion with cerebral infarction (HCC)     CHF (congestive heart failure) (HCC)     Chronic kidney disease     Coronary atherosclerosis of artery bypass graft     COVID 01/31/2022    Cramp of limb     Gallstones     Hemodialysis patient ([facility])     [address]  /  [place]    Hyperlipidemia     Hypertension     Insomnia     Neuromuscular disorder ([facility])     Pneumonia     Psychiatric problem     Thyroid disease     Type II or unspecified type diabetes mellitus with renal manifestations, not stated as uncontrolled(250.40)     Type II or unspecified type diabetes mellitus without mention of complication, not stated as uncontrolled     Unspecified vitamin D deficiency          Past Surgical History:    Past Surgical History:   Procedure Laterality Date    ANKLE FRACTURE SURGERY      AV FISTULA CREATION  12/14/2021    BACK SURGERY      BREAST SURGERY      CARDIAC CATHETERIZATION  2005    MVD  /  CT CONSULT    CARDIAC SURGERY      CARPAL TUNNEL RELEASE      CHOLECYSTECTOMY, OPEN N/A     COLONOSCOPY      CORONARY ANGIOPLASTY WITH STENT PLACEMENT  02/14/2023    PTCA / FADIA RCA    CORONARY ANGIOPLASTY WITH STENT PLACEMENT  2019
11/14/2021 02:04 AM     Urine Protein:     Lab Results   Component Value Date/Time    TPU 20 11/12/2021 10:30 AM     Urine Creatinine:     Lab Results   Component Value Date/Time    LABCREA 65.4 01/26/2023 01:10 PM     IMPRESSION/RECOMMENDATIONS:      1.  End-stage renal disease - we will maintain MWF hemodialysis schedule. Renal diet,i.e 2-gram sodium,2-gram potassium,1500 ml fluid restriction,1-gram phosphorus, 1800 KCal and 1.2 gram/kg protein per day. 2.  Systemic hypertension - blood pressure control is adequate. 3.  Recurrent chest pain - not consistent with uremic pericarditis. We will await cardiology input. 4.  Mineral bone disease profile - check serum phosphorus level. 5.  Anemia of end-stage renal disease -  Hemoglobin 10.4 g/dL is within target range. 6.  Hypokalemia - we will correct by dialyzing against a high potassium bath    Prognosis is guarded. Thank you very much for the courtesy and confidence of this consultation.     Kingsley Ogden MD FACP  Attending Nephrologist  10/4/2023 9:31 AM
07/31/23    CT HEAD WO CONTRAST    Narrative  EXAMINATION:  CT OF THE HEAD WITHOUT CONTRAST  8/5/2023 10:05 am    TECHNIQUE:  CT of the head was performed without the administration of intravenous  contrast. Automated exposure control, iterative reconstruction, and/or weight  based adjustment of the mA/kV was utilized to reduce the radiation dose to as  low as reasonably achievable. COMPARISON:  CT brain performed 05/31/2023. HISTORY:  ORDERING SYSTEM PROVIDED HISTORY: confusion  TECHNOLOGIST PROVIDED HISTORY:  confusion    Reason for Exam: memory loss x1 day- all of a sudden  Additional signs and symptoms: hx of stroke    FINDINGS:  BRAIN/VENTRICLES: There is no acute intracranial hemorrhage, mass effect, or  midline shift. There is satisfactory overall gray-white matter  differentiation. The ventricular structures are symmetric and unremarkable. The infratentorial structures are unremarkable. ORBITS: The visualized portion of the orbits demonstrate no acute abnormality. SINUSES: The visualized paranasal sinuses and mastoid air cells demonstrate  no acute abnormality. SOFT TISSUES/SKULL:  No acute abnormality of the visualized skull or soft  tissues. Impression  No acute intracranial abnormality. I personally reviewed all of the above medications, clinical laboratory, imaging and other diagnostic tests. 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 Has had multiple MRI's and HCT's that are unremarkable. Patient is completely back to baseline currently and AxOx3. ESRD  Chest pain     Plan:      MRI brain done on previous admission is negative for acute abnormalities  Her symptoms are selective and do not have a neurologic etiology  At this point no further neurologic work-up. We will sign off, please do not hesitate to contact with any further questions or concerns.    Can update HCT is felt needed       Thank you for

## 2023-10-05 NOTE — PLAN OF CARE
Problem: Discharge Planning  Goal: Discharge to home or other facility with appropriate resources  Outcome: Adequate for Discharge     Problem: Pain  Goal: Verbalizes/displays adequate comfort level or baseline comfort level  10/5/2023 1620 by Roseline Sharp RN  Outcome: Adequate for Discharge     Problem: Safety - Adult  Goal: Free from fall injury  10/5/2023 1620 by Roseline Sharp RN  Outcome: Adequate for Discharge     Problem: Cardiovascular - Adult  Goal: Maintains optimal cardiac output and hemodynamic stability  10/5/2023 1620 by Roseline Sharp RN  Outcome: Adequate for Discharge     Problem: Metabolic/Fluid and Electrolytes - Adult  Goal: Electrolytes maintained within normal limits  10/5/2023 1620 by Roseline Sharp RN  Outcome: Adequate for Discharge     Problem: Hematologic - Adult  Goal: Maintains hematologic stability  10/5/2023 1620 by Roseline Sharp RN  Outcome: Adequate for Discharge     Problem: Skin/Tissue Integrity  Goal: Absence of new skin breakdown  Outcome: Adequate for Discharge     Problem: ABCDS Injury Assessment  Goal: Absence of physical injury  Outcome: Adequate for Discharge
Problem: Discharge Planning  Goal: Discharge to home or other facility with appropriate resources  Outcome: Progressing  Flowsheets (Taken 10/4/2023 1656)  Discharge to home or other facility with appropriate resources:   Identify barriers to discharge with patient and caregiver   Identify discharge learning needs (meds, wound care, etc)   Arrange for needed discharge resources and transportation as appropriate     Problem: Pain  Goal: Verbalizes/displays adequate comfort level or baseline comfort level  10/4/2023 1656 by Marina Larose RN  Outcome: Progressing  Flowsheets (Taken 10/4/2023 1656)  Verbalizes/displays adequate comfort level or baseline comfort level:   Assess pain using appropriate pain scale   Administer analgesics based on type and severity of pain and evaluate response   Encourage patient to monitor pain and request assistance  Note: Patient's pain has been well controlled throughout the entire shift, please see MAR. 10/4/2023 0425 by Mackenzie Conroy RN  Outcome: Progressing  Note: Assessed all pain characteristics including level, type, location, frequency, and onset. Non-pharmacologic interventions offered to pt as well. Pt states pain is tolerable at this time. Will continue to monitor. 10/4/2023 0424 by Mackenzie Conroy RN  Outcome: Progressing     Problem: Safety - Adult  Goal: Free from fall injury  10/4/2023 1656 by Marina Larose RN  Outcome: Progressing  Flowsheets (Taken 10/4/2023 1656)  Free From Fall Injury: Instruct family/caregiver on patient safety  Note: The patient remained free from falls this shift, call light within reach, bed in locked and lowest position. Side rails up x2. Continue to monitor closely. 10/4/2023 0425 by Mackenzie Conroy RN  Outcome: Progressing  Note: No falls noted this shift. Patient ambulates with x1 staff assistance without difficulty. Bed kept in low position. Safe environment maintained. Bedside table & call light in reach.  Uses call light
Problem: Safety - Adult  Goal: Free from fall injury  10/4/2023 0425 by Liam Cox RN  Outcome: Progressing  Note: No falls noted this shift. Patient ambulates with x1 staff assistance without difficulty. Bed kept in low position. Safe environment maintained. Bedside table & call light in reach. Uses call light appropriately when needing assistance. Problem: Cardiovascular - Adult  Goal: Maintains optimal cardiac output and hemodynamic stability  Outcome: Progressing     Problem: Pain  Goal: Verbalizes/displays adequate comfort level or baseline comfort level  10/4/2023 0425 by Liam Cox RN  Outcome: Progressing  Note: Assessed all pain characteristics including level, type, location, frequency, and onset. Non-pharmacologic interventions offered to pt as well. Pt states pain is tolerable at this time. Will continue to monitor. Problem: Skin/Tissue Integrity  Goal: Absence of new skin breakdown  Description: 1. Monitor for areas of redness and/or skin breakdown  2. Assess vascular access sites hourly  3. Every 4-6 hours minimum:  Change oxygen saturation probe site  4. Every 4-6 hours:  If on nasal continuous positive airway pressure, respiratory therapy assess nares and determine need for appliance change or resting period.   Outcome: Progressing     Problem: Hematologic - Adult  Goal: Maintains hematologic stability  Outcome: Progressing
independently. Area kept free from moisture. Proper nourishment and fluids encouraged, as appropriate. Skin remains clean, dry, and intact. Will continue to monitor for additional needs and changes in skin breakdown.

## 2023-10-05 NOTE — CARE COORDINATION
ONGOING DISCHARGE PLAN:    Patient is alert . Pt. Did not know, where she was at, or what year it, & was having trouble remembering her name. She states, \"My Sister said, I did this before\". Pt. Was reoriented. Neuro to see. Spoke with patient regarding discharge plan and patient confirms that plan is still to return to home w/ [facility]. Pt. Was informed that [facility], would mostly not have an available bed available. She does not want to go anywhere else & wants to go home. PT/OT On Board. Will follow for any rec/needs. Cardio on board. Remains on Effient. Will continue to follow for additional discharge needs. If patient is discharged prior to next notation, then this note serves as note for discharge by case management.     Electronically signed by Ariel Garcia RN on 10/5/2023 at 9:35 AM
Writer faxed [facility]/DC med list to [facility]. Informed Carolyn of DC today & to call with any questions.
agrees with the discharge plan. Freedom of choice list with basic dialogue that supports the patient's individualized plan of care/goals and shares the quality data associated with the providers was provided to: Patient   Patient Representative Name:       The Patient and/or Patient Representative Agree with the Discharge Plan?  Yes    Segundo May RN  Case Management Department  Ph:  Fax:
Risk Assessment Score:  Readmission Risk              Risk of Unplanned Readmission:  47           Discharging to Facility/ [facility] [address] Chinedu G[place]am, [place]  Phone:  [phone]  Fax:  [phone]     Dialysis Facility (if applicable)   Name: [facility]  Address:  Dialysis Schedule:M/W/F @ 10:15  Phone:  Fax:    / signature: Electronically signed by Lindy Pillai RN on 10/4/23 at 11:02 AM EDT    PHYSICIAN SECTION    Prognosis: Guarded    Condition at Discharge: Stable    Rehab Potential (if transferring to Rehab): Guarded    Recommended Labs or Other Treatments After Discharge:     Physician Certification: I certify the above information and transfer of Kapil Acosta  is necessary for the continuing treatment of the diagnosis listed and that she requires Home Care for greater 30 days. Update Admission H&P: No change in H&P    PHYSICIAN SIGNATURE:  Electronically signed by Malcolm Salgado MD on 10/5/23 at 12:51 PM EDT    Medication List    START taking these medications    START taking these medications   Melatonin 10 MG Tabs  Take 10 mg by mouth nightly as needed (for sleep) Indications: Trouble Sleeping   prasugrel 10 MG Tabs  Commonly known as: EFFIENT  Take 1 tablet by mouth daily  Start taking on: October 6, 2023     CHANGE how you take these medications    CHANGE how you take these medications   Basaglar KwikPen 100 UNIT/ML injection pen  Generic drug: insulin glargine  INJECT 55 UNITS SUBCUTANEOUSLY TWICE DAILY  What changed: See the new instructions. bumetanide 2 MG tablet  Commonly known as: BUMEX  Take 1 tablet by mouth 2 times daily Indications: with 1 mg tablet to make 3 mg dose  What changed: Another medication with the same name was removed.  Continue taking this medication, and follow the directions you see here.   gabapentin 100 MG capsule  Commonly known as: NEURONTIN  Take 1 capsule by mouth once daily for 30

## 2023-10-05 NOTE — FLOWSHEET NOTE
10/05/23 0012   Treatment Team Notification   Reason for Communication Change in status   Name of Team Member Notified Dr. Jason Alonso Team Role Attending Provider   Method of Communication Secure Message   Response Waiting for response   Notification Time 0013       RN notified Dr. Kayla Swartz that, RN was rounding and found out pt was experiencing confusion. RN asked the pt what was going on but pt did not recognize RN nor herself. Pt stated, \" I do not remember my name, where I am or what is happening to me. \" RN attempted to remind the pt her name,what brought her here and what is going on, but pt continuous to show confusion. RN ok'd to place neuro check orders q4h and Dr. Kayla Swartz will see her in the A.M. See Orders.

## 2023-10-06 ENCOUNTER — CARE COORDINATION (OUTPATIENT)
Dept: CASE MANAGEMENT | Age: 63
End: 2023-10-06

## 2023-10-06 NOTE — CARE COORDINATION
Care Transitions Outreach Attempt    Call within 2 business days of discharge: Yes   Attempted to reach patient for transitions of care follow up. Unable to reach patient. Patient: Jameel Claire Patient : 1960 MRN: [id number]    Last Discharge Facility       Date Complaint Diagnosis Description Type Department Provider    10/3/23 Chest Pain Refractory angina pectoris ([facility] [address] [facility]) . .. ED to Hosp-Admission (Discharged) (ADMITTED) MOE Queen MD; [place] Montez. .. # 1 attempt-Attempted initial 24 hour hospital follow up call. Left a Hipaa compliant message with name and call back information. Requested return call to [phone]. Was this an external facility discharge?  No Discharge Facility Name: [facility]    Noted following upcoming appointments from discharge chart review:   [facility] follow up appointment(s):   Future Appointments   Date Time Provider [address]   2023  1:30 PM Tara Garcia, APRN - [address]     Non-[facility]  follow up appointment(s):

## 2023-10-09 ENCOUNTER — CARE COORDINATION (OUTPATIENT)
Dept: CASE MANAGEMENT | Age: 63
End: 2023-10-09

## 2023-10-09 DIAGNOSIS — R07.9 CHEST PAIN WITH HIGH RISK FOR CARDIAC ETIOLOGY: Primary | ICD-10-CM

## 2023-10-09 PROCEDURE — 1111F DSCHRG MED/CURRENT MED MERGE: CPT | Performed by: NURSE PRACTITIONER

## 2023-10-09 NOTE — CARE COORDINATION
Request from Jason Bertrand RN.,  please schedule patient for hospital f/u. Called PCP they will contact and schedule patient direct due to her request of certain day and time. Cardio appt 10/24 @10:30AM  patient aware of time and date.     Dorothea Daniels, [address] [facility] Coordination Transition

## 2023-10-09 NOTE — CARE COORDINATION
Care Transitions Initial Follow Up Call    Call within 2 business days of discharge: Yes    Patient Current Location: [address] [place] [address] Transition Nurse contacted the patient by telephone to perform post hospital discharge assessment. Verified name and  with patient as identifiers. Provided introduction to self, and explanation of the Care Transition Nurse role. Patient: Timo Christopher Patient : 1960   MRN: [id number]  Reason for Admission: CP  Discharge Date: 10/5/23 RARS: Readmission Risk Score: 31.7      Last Discharge Facility       Date Complaint Diagnosis Description Type Department Provider    10/3/23 Chest Pain Refractory angina pectoris ([address] [facility]) . .. ED to Hosp-Admission (Discharged) (ADMITTED) MOE Atkinson MD; Cordell Tubbs. .. Was this an external facility discharge? No Discharge Facility: [facility]    Challenges to be reviewed by the provider   Additional needs identified to be addressed with provider: Yes  Needs order for BP cuff, needs HFU appt               Method of communication with provider: phone. Spoke to patient for transitions initial call. Stated she is currently at HD, goes MWF @ 9:15-1:30pm. Patient was admitted for intermittent CP, SOB after HD last week. Stated CP and SOB resolved, still fatigued. Pt had [facility] visit yesterday. Sugars remain elevated @ 300-400's. Reviewed medications. Pt is taking insulin Basaglar 62 U bid, changed to 55 U bid at discharge. Pt also on Novolog 17 U tid ac plus s/s. Pt is awaiting call from pharmacy for Effient. Reviewed order from [facility] , clarification to take Tylenol 650 MG bid prn. Pt would like a BP cuff, will request order from PCP. Pt had RPM in past, stated she graduated from program. Pt is followed by [facility] for care management. Pt needs HFU for PCP and Cardiology, requests a Thursday appt so her sister can go with her. Attempted to call PCP office, no answer.  Will refer to  Mary Jane Maya

## 2023-10-10 LAB
EKG ATRIAL RATE: 81 BPM
EKG P AXIS: 70 DEGREES
EKG P-R INTERVAL: 162 MS
EKG Q-T INTERVAL: 402 MS
EKG QRS DURATION: 92 MS
EKG QTC CALCULATION (BAZETT): 466 MS
EKG R AXIS: 13 DEGREES
EKG T AXIS: -159 DEGREES
EKG VENTRICULAR RATE: 81 BPM

## 2023-10-13 ENCOUNTER — CARE COORDINATION (OUTPATIENT)
Dept: CASE MANAGEMENT | Age: 63
End: 2023-10-13

## 2023-10-13 NOTE — CARE COORDINATION
Care Transitions Outreach Attempt #1      Attempted to reach patient for transitions of care follow up. Unable to reach patient. HIPAA compliant message left on  requesting a return call. Patient: Ed Dafter Patient : 1960 MRN: [id number]    Last Discharge Facility       Date Complaint Diagnosis Description Type Department Provider    10/3/23 Chest Pain Refractory angina pectoris ([facility] [address] [facility]) . .. ED to Hosp-Admission (Discharged) (ADMITTED) MOE Damon MD; Sonu Garrison. .. Was this an external facility discharge?  No Discharge Facility: [facility]    Noted following upcoming appointments from discharge chart review:   [facility] follow up appointment(s):   Future Appointments   Date Time Provider [address]   10/17/2023 11:00 AM Preet White, [facility]   10/24/2023 10:30 AM AFSANEH Palomares - CNP [facility] [place] [facility]   2023  1:30 PM Chapo Payne, [address] [place] [address] LPN  Care Transition Nurse

## 2023-10-16 ENCOUNTER — CARE COORDINATION (OUTPATIENT)
Dept: CASE MANAGEMENT | Age: 63
End: 2023-10-16

## 2023-10-16 NOTE — CARE COORDINATION
Care Transitions Follow Up Call    Patient Current Location:  Home: [address] [place] [address] Care Coordinator contacted the patient by telephone to follow up after admission on 10/03/2023. Verified name and  with patient as identifiers. Patient: Lary Hussein  Patient : 1960   MRN: [id number]  Reason for Admission: CP  Discharge Date: 10/5/23 RARS: Readmission Risk Score: 31.7      Needs to be reviewed by the provider   Additional needs identified to be addressed with provider: No  none             Method of communication with provider: none. Writer spoke with Mariia Ladd for a follow up care transitions call. She states she is doing well. She was able to get her Dexcom and states reading have been good. She has her PCP and cardiology appointments scheduled. No more episodes of CP or SOB. Patient is tolerating Effient without side effects. Will refer back to ACM. Addressed changes since last contact:  none  Discussed follow-up appointments. If no appointment was previously scheduled, appointment scheduling offered: Yes. Is follow up appointment scheduled within 7 days of discharge? No.    Follow Up  Future Appointments   Date Time Provider [address]   10/17/2023 11:00 AM Loco Ady, APRN - CNP [facility]   10/24/2023 10:30 AM AFSANEH Saldaña CNP [facility]   2023  1:30 PM [facility]     External follow up appointment(s): N/A    LPN Care Coordinator reviewed discharge instructions and red flags with patient and discussed any barriers to care and/or understanding of plan of care after discharge. Discussed appropriate site of care based on symptoms and resources available to patient including: PCP  Specialist  Urgent care clinics  [facility] [facility]. The patient agrees to contact the PCP office for questions related to their healthcare. Advance Care Planning:   not on file.      Patients top

## 2023-10-30 ENCOUNTER — APPOINTMENT (OUTPATIENT)
Dept: GENERAL RADIOLOGY | Age: 63
DRG: 302 | End: 2023-10-30
Payer: COMMERCIAL

## 2023-10-30 ENCOUNTER — HOSPITAL ENCOUNTER (INPATIENT)
Age: 63
LOS: 3 days | Discharge: STILL ADMITTED OR ADMINISTRATIVE | DRG: 302 | End: 2023-11-03
Attending: STUDENT IN AN ORGANIZED HEALTH CARE EDUCATION/TRAINING PROGRAM | Admitting: STUDENT IN AN ORGANIZED HEALTH CARE EDUCATION/TRAINING PROGRAM
Payer: COMMERCIAL

## 2023-10-30 DIAGNOSIS — R07.9 CHEST PAIN, UNSPECIFIED TYPE: Primary | ICD-10-CM

## 2023-10-30 DIAGNOSIS — I21.4 NSTEMI (NON-ST ELEVATED MYOCARDIAL INFARCTION) (HCC): ICD-10-CM

## 2023-10-30 LAB
ANION GAP SERPL CALCULATED.3IONS-SCNC: 13 MMOL/L (ref 9–17)
BASOPHILS # BLD: 0.1 K/UL (ref 0–0.2)
BASOPHILS NFR BLD: 1 % (ref 0–2)
BUN SERPL-MCNC: 21 MG/DL (ref 8–23)
CALCIUM SERPL-MCNC: 9.9 MG/DL (ref 8.6–10.4)
CHLORIDE SERPL-SCNC: 91 MMOL/L (ref 98–107)
CO2 SERPL-SCNC: 32 MMOL/L (ref 20–31)
CREAT SERPL-MCNC: 2 MG/DL (ref 0.5–0.9)
EKG ATRIAL RATE: 69 BPM
EKG P AXIS: 57 DEGREES
EKG P-R INTERVAL: 160 MS
EKG Q-T INTERVAL: 414 MS
EKG QRS DURATION: 98 MS
EKG QTC CALCULATION (BAZETT): 443 MS
EKG R AXIS: 12 DEGREES
EKG T AXIS: -176 DEGREES
EKG VENTRICULAR RATE: 69 BPM
EOSINOPHIL # BLD: 0.2 K/UL (ref 0–0.4)
EOSINOPHILS RELATIVE PERCENT: 3 % (ref 0–4)
ERYTHROCYTE [DISTWIDTH] IN BLOOD BY AUTOMATED COUNT: 16.5 % (ref 11.5–14.9)
GFR SERPL CREATININE-BSD FRML MDRD: 27 ML/MIN/1.73M2
GLUCOSE BLD-MCNC: 230 MG/DL (ref 65–105)
GLUCOSE SERPL-MCNC: 211 MG/DL (ref 70–99)
HCT VFR BLD AUTO: 32.6 % (ref 36–46)
HGB BLD-MCNC: 11.2 G/DL (ref 12–16)
LYMPHOCYTES NFR BLD: 1.4 K/UL (ref 1–4.8)
LYMPHOCYTES RELATIVE PERCENT: 25 % (ref 24–44)
MAGNESIUM SERPL-MCNC: 2.4 MG/DL (ref 1.6–2.6)
MCH RBC QN AUTO: 35.5 PG (ref 26–34)
MCHC RBC AUTO-ENTMCNC: 34.3 G/DL (ref 31–37)
MCV RBC AUTO: 103.3 FL (ref 80–100)
MONOCYTES NFR BLD: 0.5 K/UL (ref 0.1–1.3)
MONOCYTES NFR BLD: 8 % (ref 1–7)
NEUTROPHILS NFR BLD: 63 % (ref 36–66)
NEUTS SEG NFR BLD: 3.7 K/UL (ref 1.3–9.1)
PLATELET # BLD AUTO: 268 K/UL (ref 150–450)
PMV BLD AUTO: 8.1 FL (ref 6–12)
POTASSIUM SERPL-SCNC: 3.2 MMOL/L (ref 3.7–5.3)
RBC # BLD AUTO: 3.15 M/UL (ref 4–5.2)
SODIUM SERPL-SCNC: 136 MMOL/L (ref 135–144)
TROPONIN I SERPL HS-MCNC: 76 NG/L (ref 0–14)
TROPONIN I SERPL HS-MCNC: 76 NG/L (ref 0–14)
WBC OTHER # BLD: 5.8 K/UL (ref 3.5–11)

## 2023-10-30 PROCEDURE — 6370000000 HC RX 637 (ALT 250 FOR IP): Performed by: STUDENT IN AN ORGANIZED HEALTH CARE EDUCATION/TRAINING PROGRAM

## 2023-10-30 PROCEDURE — 80048 BASIC METABOLIC PNL TOTAL CA: CPT

## 2023-10-30 PROCEDURE — 84484 ASSAY OF TROPONIN QUANT: CPT

## 2023-10-30 PROCEDURE — 96372 THER/PROPH/DIAG INJ SC/IM: CPT

## 2023-10-30 PROCEDURE — 93005 ELECTROCARDIOGRAM TRACING: CPT | Performed by: STUDENT IN AN ORGANIZED HEALTH CARE EDUCATION/TRAINING PROGRAM

## 2023-10-30 PROCEDURE — G0378 HOSPITAL OBSERVATION PER HR: HCPCS

## 2023-10-30 PROCEDURE — 82947 ASSAY GLUCOSE BLOOD QUANT: CPT

## 2023-10-30 PROCEDURE — 96374 THER/PROPH/DIAG INJ IV PUSH: CPT

## 2023-10-30 PROCEDURE — 71046 X-RAY EXAM CHEST 2 VIEWS: CPT

## 2023-10-30 PROCEDURE — 2580000003 HC RX 258: Performed by: STUDENT IN AN ORGANIZED HEALTH CARE EDUCATION/TRAINING PROGRAM

## 2023-10-30 PROCEDURE — 6360000002 HC RX W HCPCS: Performed by: STUDENT IN AN ORGANIZED HEALTH CARE EDUCATION/TRAINING PROGRAM

## 2023-10-30 PROCEDURE — 83735 ASSAY OF MAGNESIUM: CPT

## 2023-10-30 PROCEDURE — 85025 COMPLETE CBC W/AUTO DIFF WBC: CPT

## 2023-10-30 PROCEDURE — 93010 ELECTROCARDIOGRAM REPORT: CPT | Performed by: INTERNAL MEDICINE

## 2023-10-30 PROCEDURE — 99285 EMERGENCY DEPT VISIT HI MDM: CPT

## 2023-10-30 PROCEDURE — 36415 COLL VENOUS BLD VENIPUNCTURE: CPT

## 2023-10-30 RX ORDER — HEPARIN SODIUM 5000 [USP'U]/ML
5000 INJECTION, SOLUTION INTRAVENOUS; SUBCUTANEOUS EVERY 8 HOURS SCHEDULED
Status: DISCONTINUED | OUTPATIENT
Start: 2023-10-30 | End: 2023-10-30

## 2023-10-30 RX ORDER — GABAPENTIN 100 MG/1
100 CAPSULE ORAL NIGHTLY
Status: DISCONTINUED | OUTPATIENT
Start: 2023-10-30 | End: 2023-11-03 | Stop reason: HOSPADM

## 2023-10-30 RX ORDER — LANOLIN ALCOHOL/MO/W.PET/CERES
3 CREAM (GRAM) TOPICAL NIGHTLY PRN
Status: DISCONTINUED | OUTPATIENT
Start: 2023-10-30 | End: 2023-11-03 | Stop reason: HOSPADM

## 2023-10-30 RX ORDER — CARVEDILOL 3.12 MG/1
3.12 TABLET ORAL 2 TIMES DAILY
Status: DISCONTINUED | OUTPATIENT
Start: 2023-10-30 | End: 2023-11-03 | Stop reason: HOSPADM

## 2023-10-30 RX ORDER — ACETAMINOPHEN 325 MG/1
650 TABLET ORAL 2 TIMES DAILY
Status: DISCONTINUED | OUTPATIENT
Start: 2023-10-30 | End: 2023-11-03 | Stop reason: HOSPADM

## 2023-10-30 RX ORDER — POTASSIUM CHLORIDE 7.45 MG/ML
10 INJECTION INTRAVENOUS PRN
Status: DISCONTINUED | OUTPATIENT
Start: 2023-10-30 | End: 2023-11-03 | Stop reason: HOSPADM

## 2023-10-30 RX ORDER — VENLAFAXINE HYDROCHLORIDE 75 MG/1
75 CAPSULE, EXTENDED RELEASE ORAL
Status: DISCONTINUED | OUTPATIENT
Start: 2023-10-31 | End: 2023-11-03 | Stop reason: HOSPADM

## 2023-10-30 RX ORDER — MIDODRINE HYDROCHLORIDE 5 MG/1
5 TABLET ORAL PRN
Status: DISCONTINUED | OUTPATIENT
Start: 2023-10-30 | End: 2023-11-03 | Stop reason: HOSPADM

## 2023-10-30 RX ORDER — INSULIN GLARGINE 100 [IU]/ML
62 INJECTION, SOLUTION SUBCUTANEOUS 2 TIMES DAILY
Status: DISCONTINUED | OUTPATIENT
Start: 2023-10-30 | End: 2023-11-03 | Stop reason: HOSPADM

## 2023-10-30 RX ORDER — FENTANYL CITRATE 0.05 MG/ML
50 INJECTION, SOLUTION INTRAMUSCULAR; INTRAVENOUS ONCE
Status: COMPLETED | OUTPATIENT
Start: 2023-10-30 | End: 2023-10-30

## 2023-10-30 RX ORDER — SODIUM CHLORIDE 9 MG/ML
INJECTION, SOLUTION INTRAVENOUS PRN
Status: DISCONTINUED | OUTPATIENT
Start: 2023-10-30 | End: 2023-11-03 | Stop reason: HOSPADM

## 2023-10-30 RX ORDER — SODIUM BICARBONATE 650 MG/1
1300 TABLET ORAL 3 TIMES DAILY
Status: DISCONTINUED | OUTPATIENT
Start: 2023-10-30 | End: 2023-11-03 | Stop reason: HOSPADM

## 2023-10-30 RX ORDER — POTASSIUM CHLORIDE 20 MEQ/1
40 TABLET, EXTENDED RELEASE ORAL PRN
Status: DISCONTINUED | OUTPATIENT
Start: 2023-10-30 | End: 2023-11-03 | Stop reason: HOSPADM

## 2023-10-30 RX ORDER — ALLOPURINOL 100 MG/1
100 TABLET ORAL DAILY
Status: DISCONTINUED | OUTPATIENT
Start: 2023-10-31 | End: 2023-11-03 | Stop reason: HOSPADM

## 2023-10-30 RX ORDER — BUMETANIDE 1 MG/1
3 TABLET ORAL 2 TIMES DAILY
Status: DISCONTINUED | OUTPATIENT
Start: 2023-10-30 | End: 2023-11-03 | Stop reason: HOSPADM

## 2023-10-30 RX ORDER — NITROGLYCERIN 0.4 MG/1
0.4 TABLET SUBLINGUAL EVERY 5 MIN PRN
Status: DISCONTINUED | OUTPATIENT
Start: 2023-10-30 | End: 2023-11-03 | Stop reason: HOSPADM

## 2023-10-30 RX ORDER — ONDANSETRON 4 MG/1
4 TABLET, ORALLY DISINTEGRATING ORAL EVERY 8 HOURS PRN
Status: DISCONTINUED | OUTPATIENT
Start: 2023-10-30 | End: 2023-11-03 | Stop reason: HOSPADM

## 2023-10-30 RX ORDER — ASPIRIN 81 MG/1
81 TABLET, CHEWABLE ORAL DAILY
Status: DISCONTINUED | OUTPATIENT
Start: 2023-10-31 | End: 2023-11-03 | Stop reason: HOSPADM

## 2023-10-30 RX ORDER — POLYETHYLENE GLYCOL 3350 17 G/17G
17 POWDER, FOR SOLUTION ORAL DAILY PRN
Status: DISCONTINUED | OUTPATIENT
Start: 2023-10-30 | End: 2023-11-03 | Stop reason: HOSPADM

## 2023-10-30 RX ORDER — ONDANSETRON 2 MG/ML
4 INJECTION INTRAMUSCULAR; INTRAVENOUS EVERY 6 HOURS PRN
Status: DISCONTINUED | OUTPATIENT
Start: 2023-10-30 | End: 2023-11-03 | Stop reason: HOSPADM

## 2023-10-30 RX ORDER — DOCUSATE SODIUM 100 MG/1
100 CAPSULE, LIQUID FILLED ORAL 2 TIMES DAILY
Status: DISCONTINUED | OUTPATIENT
Start: 2023-10-30 | End: 2023-11-03 | Stop reason: HOSPADM

## 2023-10-30 RX ORDER — SODIUM CHLORIDE 0.9 % (FLUSH) 0.9 %
5-40 SYRINGE (ML) INJECTION EVERY 12 HOURS SCHEDULED
Status: DISCONTINUED | OUTPATIENT
Start: 2023-10-30 | End: 2023-11-03 | Stop reason: HOSPADM

## 2023-10-30 RX ORDER — POLYVINYL ALCOHOL 14 MG/ML
1 SOLUTION/ DROPS OPHTHALMIC PRN
COMMUNITY

## 2023-10-30 RX ORDER — ATORVASTATIN CALCIUM 80 MG/1
80 TABLET, FILM COATED ORAL NIGHTLY
Status: DISCONTINUED | OUTPATIENT
Start: 2023-10-30 | End: 2023-11-03 | Stop reason: HOSPADM

## 2023-10-30 RX ORDER — MAGNESIUM SULFATE HEPTAHYDRATE 40 MG/ML
2000 INJECTION, SOLUTION INTRAVENOUS PRN
Status: DISCONTINUED | OUTPATIENT
Start: 2023-10-30 | End: 2023-11-03 | Stop reason: HOSPADM

## 2023-10-30 RX ORDER — PRASUGREL 10 MG/1
10 TABLET, FILM COATED ORAL DAILY
Status: DISCONTINUED | OUTPATIENT
Start: 2023-10-31 | End: 2023-11-03 | Stop reason: HOSPADM

## 2023-10-30 RX ORDER — SODIUM CHLORIDE 0.9 % (FLUSH) 0.9 %
5-40 SYRINGE (ML) INJECTION PRN
Status: DISCONTINUED | OUTPATIENT
Start: 2023-10-30 | End: 2023-11-03 | Stop reason: HOSPADM

## 2023-10-30 RX ORDER — BUSPIRONE HYDROCHLORIDE 10 MG/1
10 TABLET ORAL 3 TIMES DAILY
Status: DISCONTINUED | OUTPATIENT
Start: 2023-10-30 | End: 2023-11-03 | Stop reason: HOSPADM

## 2023-10-30 RX ORDER — DEXTROSE MONOHYDRATE 100 MG/ML
INJECTION, SOLUTION INTRAVENOUS CONTINUOUS PRN
Status: DISCONTINUED | OUTPATIENT
Start: 2023-10-30 | End: 2023-11-03 | Stop reason: HOSPADM

## 2023-10-30 RX ADMIN — DOCUSATE SODIUM 100 MG: 100 CAPSULE, LIQUID FILLED ORAL at 21:22

## 2023-10-30 RX ADMIN — SODIUM BICARBONATE 1300 MG: 650 TABLET ORAL at 21:21

## 2023-10-30 RX ADMIN — ATORVASTATIN CALCIUM 80 MG: 80 TABLET, FILM COATED ORAL at 21:22

## 2023-10-30 RX ADMIN — ACETAMINOPHEN 650 MG: 325 TABLET ORAL at 21:23

## 2023-10-30 RX ADMIN — BUSPIRONE HYDROCHLORIDE 10 MG: 10 TABLET ORAL at 21:22

## 2023-10-30 RX ADMIN — HEPARIN SODIUM 5000 UNITS: 5000 INJECTION INTRAVENOUS; SUBCUTANEOUS at 21:30

## 2023-10-30 RX ADMIN — CARVEDILOL 3.12 MG: 3.12 TABLET, FILM COATED ORAL at 21:24

## 2023-10-30 RX ADMIN — Medication 3 MG: at 21:22

## 2023-10-30 RX ADMIN — POTASSIUM BICARBONATE 40 MEQ: 782 TABLET, EFFERVESCENT ORAL at 18:53

## 2023-10-30 RX ADMIN — BUMETANIDE 3 MG: 1 TABLET ORAL at 21:24

## 2023-10-30 RX ADMIN — FENTANYL CITRATE 50 MCG: 0.05 INJECTION, SOLUTION INTRAMUSCULAR; INTRAVENOUS at 15:54

## 2023-10-30 RX ADMIN — SODIUM CHLORIDE, PRESERVATIVE FREE 10 ML: 5 INJECTION INTRAVENOUS at 21:25

## 2023-10-30 RX ADMIN — INSULIN GLARGINE 62 UNITS: 100 INJECTION, SOLUTION SUBCUTANEOUS at 21:25

## 2023-10-30 RX ADMIN — GABAPENTIN 100 MG: 100 CAPSULE ORAL at 21:21

## 2023-10-30 ASSESSMENT — PAIN DESCRIPTION - LOCATION: LOCATION: CHEST;SHOULDER;LEG

## 2023-10-30 ASSESSMENT — PAIN - FUNCTIONAL ASSESSMENT
PAIN_FUNCTIONAL_ASSESSMENT: PREVENTS OR INTERFERES SOME ACTIVE ACTIVITIES AND ADLS
PAIN_FUNCTIONAL_ASSESSMENT: PREVENTS OR INTERFERES SOME ACTIVE ACTIVITIES AND ADLS

## 2023-10-30 ASSESSMENT — LIFESTYLE VARIABLES
HOW MANY STANDARD DRINKS CONTAINING ALCOHOL DO YOU HAVE ON A TYPICAL DAY: PATIENT DOES NOT DRINK
HOW MANY STANDARD DRINKS CONTAINING ALCOHOL DO YOU HAVE ON A TYPICAL DAY: PATIENT DOES NOT DRINK
HOW OFTEN DO YOU HAVE A DRINK CONTAINING ALCOHOL: NEVER
HOW OFTEN DO YOU HAVE A DRINK CONTAINING ALCOHOL: NEVER

## 2023-10-30 ASSESSMENT — PAIN DESCRIPTION - DIRECTION
RADIATING_TOWARDS: LEFT SHOULDER
RADIATING_TOWARDS: LEFT SHOULDER

## 2023-10-30 ASSESSMENT — ENCOUNTER SYMPTOMS
COUGH: 0
VOMITING: 0
RHINORRHEA: 0
NAUSEA: 0
ABDOMINAL PAIN: 0
SHORTNESS OF BREATH: 0

## 2023-10-30 ASSESSMENT — PAIN DESCRIPTION - ORIENTATION
ORIENTATION: MID;LEFT;RIGHT
ORIENTATION: RIGHT;LEFT;MID

## 2023-10-30 ASSESSMENT — PAIN DESCRIPTION - PAIN TYPE
TYPE: ACUTE PAIN
TYPE: ACUTE PAIN

## 2023-10-30 ASSESSMENT — PAIN SCALES - GENERAL
PAINLEVEL_OUTOF10: 4
PAINLEVEL_OUTOF10: 6
PAINLEVEL_OUTOF10: 3

## 2023-10-30 ASSESSMENT — PAIN DESCRIPTION - ONSET
ONSET: ON-GOING
ONSET: ON-GOING

## 2023-10-30 ASSESSMENT — PAIN DESCRIPTION - FREQUENCY
FREQUENCY: CONTINUOUS
FREQUENCY: CONTINUOUS

## 2023-10-30 ASSESSMENT — PAIN DESCRIPTION - DESCRIPTORS: DESCRIPTORS: ACHING;PRESSURE

## 2023-10-30 NOTE — ED NOTES
The following labs were labeled with patient stickers & tubed to lab;    []Lavender   []On Ice  []Blue  [x]Green/ Yellow  []Green/ Black []On Ice  []Pink  []Red  []Yellow    []COVID-19 Swab []Rapid    []Urine Sample  []Pelvic Cultures    []Blood Cultures      Lyudmila Leslie RN  10/30/23 1276

## 2023-10-30 NOTE — PROGRESS NOTES
Pharmacy Medication History Note      List of current medications patient is taking is complete. Source of information: patient, dispense report, OARRS    Changes made to medication list:  Medications flagged for removal (include reason, ex. noncompliance):  Diphenhydramine - patient reports she was instructed to stop taking diphenhydramine and start melatonin instead    Medications removed (include reason, ex. therapy complete or physician discontinued):  None     Medications added/doses adjusted: The patient reports her current bumetanide dose is 3 mg (one 2 mg + one 1 mg) three times daily   Patient reports taking Humalog as 15 units base plus a sliding scale  Polyvinyl alcohol eye drops added    Other notes (ex. Recent course of antibiotics, Coumadin dosing):  Per OARRS, alst fill of gabapentin was on 10/14/23 with quantity 30 for 30 days. Denies use of other OTC or herbal medications.       Allergies clarified    Medication list provided to the patient: no   Medication education provided to the patient: none      Electronically signed by Brett Parker, [address] [place] [address] on 10/30/2023 at 7:15 PM

## 2023-10-30 NOTE — ED PROVIDER NOTES
[facility]  Emergency Department Encounter  Emergency Medicine Physician     Pt Name: Dara Quinn  MRN: [id number]  [address] [facility] 1960  Date of evaluation: 10/30/23  PCP:  Michael Rinaldi, APRN - [address] [place] [address]       Chief Complaint   Patient presents with    Chest Pain       HISTORY OF PRESENT ILLNESS  (Location/Symptom, Timing/Onset, Context/Setting, Quality, Duration, Modifying Factors, Severity.)    Dara Quinn is a 70 y.o. female who presents with chest pain. Patient states that she was at dialysis today getting her usual hemodialysis when she began having some centralized chest pain that radiated throughout her chest.  States it went up into her shoulders. States that she was able to finish her dialysis treatment. She states that her nurse called squad. EMS gave her 4 baby aspirin. Patient states that she did take 1 nitro prior to EMS arriving which she states did not help her pain. States that she does have a history of 2 stents but no open heart surgery. States that she is currently on Eliquis for a history of atrial fibrillation and blood clots. She currently denies any nausea or vomiting. Denies any recent illness. States that she took all of her medications as prescribed. States that she will having some centralized chest pain.         PAST MEDICAL / SURGICAL / SOCIAL / FAMILY HISTORY    has a past medical history of Arthritis, Backache, unspecified, CAD (coronary artery disease), Cerebral artery occlusion with cerebral infarction ([address]), CHF (congestive heart failure) ([address]), Chronic kidney disease, Coronary atherosclerosis of artery bypass graft, COVID, Cramp of limb, Gallstones, Hemodialysis patient ([address]), Hyperlipidemia, Hypertension, Insomnia, Neuromuscular disorder ([address]), Pneumonia, Psychiatric problem, Thyroid disease, Type II or unspecified type diabetes mellitus with renal manifestations, not stated as uncontrolled(250.40), Type II or unspecified type

## 2023-10-31 PROBLEM — I21.4 NSTEMI (NON-ST ELEVATED MYOCARDIAL INFARCTION) (HCC): Status: ACTIVE | Noted: 2023-10-30

## 2023-10-31 PROBLEM — I20.89 ANGINA AT REST: Status: ACTIVE | Noted: 2023-10-31

## 2023-10-31 LAB
ALBUMIN SERPL-MCNC: 3.5 G/DL (ref 3.5–5.2)
ALP SERPL-CCNC: 102 U/L (ref 35–104)
ALT SERPL-CCNC: 23 U/L (ref 5–33)
ANION GAP SERPL CALCULATED.3IONS-SCNC: 12 MMOL/L (ref 9–17)
AST SERPL-CCNC: 39 U/L
BASOPHILS # BLD: 0.1 K/UL (ref 0–0.2)
BASOPHILS NFR BLD: 2 % (ref 0–2)
BILIRUB DIRECT SERPL-MCNC: 0.2 MG/DL
BILIRUB INDIRECT SERPL-MCNC: 0.5 MG/DL (ref 0–1)
BILIRUB SERPL-MCNC: 0.7 MG/DL (ref 0.3–1.2)
BUN SERPL-MCNC: 30 MG/DL (ref 8–23)
CALCIUM SERPL-MCNC: 9.9 MG/DL (ref 8.6–10.4)
CHLORIDE SERPL-SCNC: 95 MMOL/L (ref 98–107)
CO2 SERPL-SCNC: 31 MMOL/L (ref 20–31)
CREAT SERPL-MCNC: 2.9 MG/DL (ref 0.5–0.9)
EOSINOPHIL # BLD: 0.2 K/UL (ref 0–0.4)
EOSINOPHILS RELATIVE PERCENT: 3 % (ref 0–4)
ERYTHROCYTE [DISTWIDTH] IN BLOOD BY AUTOMATED COUNT: 16.2 % (ref 11.5–14.9)
ERYTHROCYTE [DISTWIDTH] IN BLOOD BY AUTOMATED COUNT: 16.3 % (ref 11.5–14.9)
GFR SERPL CREATININE-BSD FRML MDRD: 17 ML/MIN/1.73M2
GLUCOSE BLD-MCNC: 215 MG/DL (ref 65–105)
GLUCOSE BLD-MCNC: 242 MG/DL (ref 65–105)
GLUCOSE BLD-MCNC: 293 MG/DL (ref 65–105)
GLUCOSE BLD-MCNC: 81 MG/DL (ref 65–105)
GLUCOSE SERPL-MCNC: 86 MG/DL (ref 70–99)
HCT VFR BLD AUTO: 29.1 % (ref 36–46)
HCT VFR BLD AUTO: 29.5 % (ref 36–46)
HGB BLD-MCNC: 10 G/DL (ref 12–16)
HGB BLD-MCNC: 10 G/DL (ref 12–16)
INR PPP: 1.1
LYMPHOCYTES NFR BLD: 1.9 K/UL (ref 1–4.8)
LYMPHOCYTES RELATIVE PERCENT: 34 % (ref 24–44)
MAGNESIUM SERPL-MCNC: 2.5 MG/DL (ref 1.6–2.6)
MCH RBC QN AUTO: 34.6 PG (ref 26–34)
MCH RBC QN AUTO: 35.5 PG (ref 26–34)
MCHC RBC AUTO-ENTMCNC: 34 G/DL (ref 31–37)
MCHC RBC AUTO-ENTMCNC: 34.2 G/DL (ref 31–37)
MCV RBC AUTO: 101.7 FL (ref 80–100)
MCV RBC AUTO: 103.8 FL (ref 80–100)
MONOCYTES NFR BLD: 0.5 K/UL (ref 0.1–1.3)
MONOCYTES NFR BLD: 9 % (ref 1–7)
NEUTROPHILS NFR BLD: 52 % (ref 36–66)
NEUTS SEG NFR BLD: 2.9 K/UL (ref 1.3–9.1)
PLATELET # BLD AUTO: 192 K/UL (ref 150–450)
PLATELET # BLD AUTO: 213 K/UL (ref 150–450)
PMV BLD AUTO: 8 FL (ref 6–12)
PMV BLD AUTO: 8 FL (ref 6–12)
POTASSIUM SERPL-SCNC: 3.6 MMOL/L (ref 3.7–5.3)
PROT SERPL-MCNC: 6.3 G/DL (ref 6.4–8.3)
PROTHROMBIN TIME: 14.1 SEC (ref 11.8–14.6)
RBC # BLD AUTO: 2.8 M/UL (ref 4–5.2)
RBC # BLD AUTO: 2.9 M/UL (ref 4–5.2)
SODIUM SERPL-SCNC: 138 MMOL/L (ref 135–144)
WBC OTHER # BLD: 4.8 K/UL (ref 3.5–11)
WBC OTHER # BLD: 5.6 K/UL (ref 3.5–11)

## 2023-10-31 PROCEDURE — 1200000000 HC SEMI PRIVATE

## 2023-10-31 PROCEDURE — G0378 HOSPITAL OBSERVATION PER HR: HCPCS

## 2023-10-31 PROCEDURE — 6360000002 HC RX W HCPCS: Performed by: NURSE PRACTITIONER

## 2023-10-31 PROCEDURE — 99223 1ST HOSP IP/OBS HIGH 75: CPT | Performed by: NURSE PRACTITIONER

## 2023-10-31 PROCEDURE — 99223 1ST HOSP IP/OBS HIGH 75: CPT | Performed by: STUDENT IN AN ORGANIZED HEALTH CARE EDUCATION/TRAINING PROGRAM

## 2023-10-31 PROCEDURE — 97530 THERAPEUTIC ACTIVITIES: CPT

## 2023-10-31 PROCEDURE — 2580000003 HC RX 258: Performed by: STUDENT IN AN ORGANIZED HEALTH CARE EDUCATION/TRAINING PROGRAM

## 2023-10-31 PROCEDURE — 83735 ASSAY OF MAGNESIUM: CPT

## 2023-10-31 PROCEDURE — 6370000000 HC RX 637 (ALT 250 FOR IP): Performed by: STUDENT IN AN ORGANIZED HEALTH CARE EDUCATION/TRAINING PROGRAM

## 2023-10-31 PROCEDURE — 97162 PT EVAL MOD COMPLEX 30 MIN: CPT

## 2023-10-31 PROCEDURE — 80076 HEPATIC FUNCTION PANEL: CPT

## 2023-10-31 PROCEDURE — 97166 OT EVAL MOD COMPLEX 45 MIN: CPT

## 2023-10-31 PROCEDURE — 2060000000 HC ICU INTERMEDIATE R&B

## 2023-10-31 PROCEDURE — 36415 COLL VENOUS BLD VENIPUNCTURE: CPT

## 2023-10-31 PROCEDURE — 85027 COMPLETE CBC AUTOMATED: CPT

## 2023-10-31 PROCEDURE — 80048 BASIC METABOLIC PNL TOTAL CA: CPT

## 2023-10-31 PROCEDURE — 85610 PROTHROMBIN TIME: CPT

## 2023-10-31 PROCEDURE — 85025 COMPLETE CBC W/AUTO DIFF WBC: CPT

## 2023-10-31 PROCEDURE — 82947 ASSAY GLUCOSE BLOOD QUANT: CPT

## 2023-10-31 RX ORDER — HEPARIN SODIUM 1000 [USP'U]/ML
4000 INJECTION, SOLUTION INTRAVENOUS; SUBCUTANEOUS PRN
Status: DISCONTINUED | OUTPATIENT
Start: 2023-10-31 | End: 2023-10-31

## 2023-10-31 RX ORDER — HEPARIN SODIUM 1000 [USP'U]/ML
2000 INJECTION, SOLUTION INTRAVENOUS; SUBCUTANEOUS PRN
Status: DISCONTINUED | OUTPATIENT
Start: 2023-10-31 | End: 2023-11-03 | Stop reason: HOSPADM

## 2023-10-31 RX ORDER — HEPARIN SODIUM 10000 [USP'U]/100ML
5-30 INJECTION, SOLUTION INTRAVENOUS CONTINUOUS
Status: DISCONTINUED | OUTPATIENT
Start: 2023-10-31 | End: 2023-10-31

## 2023-10-31 RX ORDER — INSULIN LISPRO 100 [IU]/ML
0-16 INJECTION, SOLUTION INTRAVENOUS; SUBCUTANEOUS
Status: DISCONTINUED | OUTPATIENT
Start: 2023-10-31 | End: 2023-11-03 | Stop reason: HOSPADM

## 2023-10-31 RX ORDER — HEPARIN SODIUM 10000 [USP'U]/100ML
5-30 INJECTION, SOLUTION INTRAVENOUS CONTINUOUS
Status: DISCONTINUED | OUTPATIENT
Start: 2023-10-31 | End: 2023-11-03 | Stop reason: HOSPADM

## 2023-10-31 RX ORDER — INSULIN LISPRO 100 [IU]/ML
0-4 INJECTION, SOLUTION INTRAVENOUS; SUBCUTANEOUS NIGHTLY
Status: DISCONTINUED | OUTPATIENT
Start: 2023-10-31 | End: 2023-11-03 | Stop reason: HOSPADM

## 2023-10-31 RX ORDER — HEPARIN SODIUM 1000 [USP'U]/ML
4000 INJECTION, SOLUTION INTRAVENOUS; SUBCUTANEOUS ONCE
Status: COMPLETED | OUTPATIENT
Start: 2023-10-31 | End: 2023-10-31

## 2023-10-31 RX ORDER — HEPARIN SODIUM 1000 [USP'U]/ML
4000 INJECTION, SOLUTION INTRAVENOUS; SUBCUTANEOUS PRN
Status: DISCONTINUED | OUTPATIENT
Start: 2023-10-31 | End: 2023-11-03 | Stop reason: HOSPADM

## 2023-10-31 RX ORDER — INSULIN LISPRO 100 [IU]/ML
15 INJECTION, SOLUTION INTRAVENOUS; SUBCUTANEOUS
Status: DISCONTINUED | OUTPATIENT
Start: 2023-10-31 | End: 2023-11-02

## 2023-10-31 RX ORDER — HEPARIN SODIUM 1000 [USP'U]/ML
4000 INJECTION, SOLUTION INTRAVENOUS; SUBCUTANEOUS ONCE
Status: DISCONTINUED | OUTPATIENT
Start: 2023-10-31 | End: 2023-10-31

## 2023-10-31 RX ORDER — HEPARIN SODIUM 1000 [USP'U]/ML
30 INJECTION, SOLUTION INTRAVENOUS; SUBCUTANEOUS PRN
Status: DISCONTINUED | OUTPATIENT
Start: 2023-10-31 | End: 2023-10-31

## 2023-10-31 RX ADMIN — INSULIN LISPRO 15 UNITS: 100 INJECTION, SOLUTION INTRAVENOUS; SUBCUTANEOUS at 11:52

## 2023-10-31 RX ADMIN — NITROGLYCERIN 0.4 MG: 0.4 TABLET, ORALLY DISINTEGRATING SUBLINGUAL at 00:13

## 2023-10-31 RX ADMIN — GABAPENTIN 100 MG: 100 CAPSULE ORAL at 21:29

## 2023-10-31 RX ADMIN — ASPIRIN 81 MG: 81 TABLET, CHEWABLE ORAL at 09:44

## 2023-10-31 RX ADMIN — BUMETANIDE 3 MG: 1 TABLET ORAL at 09:55

## 2023-10-31 RX ADMIN — BUSPIRONE HYDROCHLORIDE 10 MG: 10 TABLET ORAL at 09:44

## 2023-10-31 RX ADMIN — SODIUM BICARBONATE 1300 MG: 650 TABLET ORAL at 21:57

## 2023-10-31 RX ADMIN — MIDODRINE HYDROCHLORIDE 5 MG: 5 TABLET ORAL at 04:22

## 2023-10-31 RX ADMIN — ATORVASTATIN CALCIUM 80 MG: 80 TABLET, FILM COATED ORAL at 21:57

## 2023-10-31 RX ADMIN — SODIUM BICARBONATE 1300 MG: 650 TABLET ORAL at 09:44

## 2023-10-31 RX ADMIN — INSULIN LISPRO 15 UNITS: 100 INJECTION, SOLUTION INTRAVENOUS; SUBCUTANEOUS at 17:45

## 2023-10-31 RX ADMIN — INSULIN LISPRO 4 UNITS: 100 INJECTION, SOLUTION INTRAVENOUS; SUBCUTANEOUS at 17:46

## 2023-10-31 RX ADMIN — SODIUM BICARBONATE 1300 MG: 650 TABLET ORAL at 16:17

## 2023-10-31 RX ADMIN — CARVEDILOL 3.12 MG: 3.12 TABLET, FILM COATED ORAL at 09:44

## 2023-10-31 RX ADMIN — HEPARIN SODIUM AND DEXTROSE 10 UNITS/KG/HR: 10000; 5 INJECTION INTRAVENOUS at 22:17

## 2023-10-31 RX ADMIN — INSULIN GLARGINE 62 UNITS: 100 INJECTION, SOLUTION SUBCUTANEOUS at 21:29

## 2023-10-31 RX ADMIN — ACETAMINOPHEN 650 MG: 325 TABLET ORAL at 09:45

## 2023-10-31 RX ADMIN — DOCUSATE SODIUM 100 MG: 100 CAPSULE, LIQUID FILLED ORAL at 09:45

## 2023-10-31 RX ADMIN — VENLAFAXINE HYDROCHLORIDE 75 MG: 75 CAPSULE, EXTENDED RELEASE ORAL at 09:45

## 2023-10-31 RX ADMIN — APIXABAN 5 MG: 5 TABLET, FILM COATED ORAL at 09:45

## 2023-10-31 RX ADMIN — DOCUSATE SODIUM 100 MG: 100 CAPSULE, LIQUID FILLED ORAL at 21:57

## 2023-10-31 RX ADMIN — SODIUM CHLORIDE, PRESERVATIVE FREE 10 ML: 5 INJECTION INTRAVENOUS at 09:45

## 2023-10-31 RX ADMIN — BUMETANIDE 3 MG: 1 TABLET ORAL at 21:57

## 2023-10-31 RX ADMIN — MIDODRINE HYDROCHLORIDE 5 MG: 5 TABLET ORAL at 16:17

## 2023-10-31 RX ADMIN — Medication 3 MG: at 22:23

## 2023-10-31 RX ADMIN — BUSPIRONE HYDROCHLORIDE 10 MG: 10 TABLET ORAL at 21:56

## 2023-10-31 RX ADMIN — BUSPIRONE HYDROCHLORIDE 10 MG: 10 TABLET ORAL at 16:17

## 2023-10-31 RX ADMIN — INSULIN LISPRO 4 UNITS: 100 INJECTION, SOLUTION INTRAVENOUS; SUBCUTANEOUS at 11:52

## 2023-10-31 RX ADMIN — SODIUM CHLORIDE, PRESERVATIVE FREE 10 ML: 5 INJECTION INTRAVENOUS at 22:05

## 2023-10-31 RX ADMIN — CARVEDILOL 3.12 MG: 3.12 TABLET, FILM COATED ORAL at 21:57

## 2023-10-31 RX ADMIN — HEPARIN SODIUM 4000 UNITS: 1000 INJECTION INTRAVENOUS; SUBCUTANEOUS at 21:29

## 2023-10-31 RX ADMIN — APIXABAN 5 MG: 5 TABLET, FILM COATED ORAL at 00:17

## 2023-10-31 RX ADMIN — PRASUGREL 10 MG: 10 TABLET, FILM COATED ORAL at 11:44

## 2023-10-31 RX ADMIN — ALLOPURINOL 100 MG: 100 TABLET ORAL at 09:44

## 2023-10-31 RX ADMIN — ACETAMINOPHEN 650 MG: 325 TABLET ORAL at 21:56

## 2023-10-31 RX ADMIN — INSULIN GLARGINE 62 UNITS: 100 INJECTION, SOLUTION SUBCUTANEOUS at 09:44

## 2023-10-31 ASSESSMENT — PAIN DESCRIPTION - DESCRIPTORS
DESCRIPTORS: ACHING
DESCRIPTORS: ACHING

## 2023-10-31 ASSESSMENT — ENCOUNTER SYMPTOMS
COUGH: 0
SHORTNESS OF BREATH: 0
ABDOMINAL PAIN: 0
NAUSEA: 0
VOMITING: 0

## 2023-10-31 ASSESSMENT — PAIN DESCRIPTION - ONSET
ONSET: ON-GOING
ONSET: ON-GOING

## 2023-10-31 ASSESSMENT — PAIN SCALES - GENERAL
PAINLEVEL_OUTOF10: 5
PAINLEVEL_OUTOF10: 5
PAINLEVEL_OUTOF10: 3

## 2023-10-31 ASSESSMENT — PAIN DESCRIPTION - FREQUENCY
FREQUENCY: CONTINUOUS
FREQUENCY: CONTINUOUS

## 2023-10-31 ASSESSMENT — PAIN DESCRIPTION - ORIENTATION
ORIENTATION: RIGHT;LEFT;LOWER
ORIENTATION: RIGHT;LEFT;LOWER

## 2023-10-31 ASSESSMENT — PAIN DESCRIPTION - LOCATION
LOCATION: BACK;LEG
LOCATION: BACK;LEG

## 2023-10-31 ASSESSMENT — PAIN DESCRIPTION - PAIN TYPE
TYPE: CHRONIC PAIN
TYPE: CHRONIC PAIN

## 2023-10-31 NOTE — PROGRESS NOTES
Physical Therapy  Facility/Department: [address] [place] [address]  Physical Therapy Initial Assessment    Name: Lay Templeton  : 1960  MRN: [id number]  Date of Service: 10/31/2023    Discharge Recommendations:  Continue to assess pending progress   PT Equipment Recommendations  Equipment Needed: No      Patient Diagnosis(es): The encounter diagnosis was Chest pain, unspecified type. Past Medical History:  has a past medical history of Arthritis, Backache, unspecified, CAD (coronary artery disease), Cerebral artery occlusion with cerebral infarction ([address]), CHF (congestive heart failure) ([address]), Chronic kidney disease, Coronary atherosclerosis of artery bypass graft, COVID, Cramp of limb, Gallstones, Hemodialysis patient ([address]), Hyperlipidemia, Hypertension, Insomnia, Neuromuscular disorder ([address]), Pneumonia, Psychiatric problem, Thyroid disease, Type II or unspecified type diabetes mellitus with renal manifestations, not stated as uncontrolled(250.40), Type II or unspecified type diabetes mellitus without mention of complication, not stated as uncontrolled, and Unspecified vitamin D deficiency. Past Surgical History:  has a past surgical history that includes Coronary artery bypass graft (2005); Knee arthroscopy; Carpal tunnel release; Breast surgery; Tonsillectomy; Hand surgery; Ankle fracture surgery; Cholecystectomy, open (N/A); IR TUNNELED CVC PLACE WO SQ PORT/PUMP > 5 YEARS (2021); AV fistula creation (2021); Dialysis fistula creation (Left, 2021); other surgical history (2022); back surgery; Colonoscopy; eye surgery; fracture surgery; Cardiac surgery; IR TUNNELED CVC PLACE WO SQ PORT/PUMP > 5 YEARS (2023); IR TUNNELED CVC PLACE WO SQ PORT/PUMP > 5 YEARS (Right, 2023); Dialysis Catheter Insertion (Right, 2023); other surgical history (Left, 2023); Coronary angioplasty with stent (2023); Cardiac catheterization ();  Coronary angioplasty with stent (2019); Dialysis fistula creation (Left, 4/19/2023); IR TUNNELED CVC PLACE WO SQ PORT/PUMP > 5 YEARS (6/7/2023); and IR TUNNELED CVC PLACE WO SQ PORT/PUMP > 5 YEARS (6/8/2023). Assessment   Body Structures, Functions, Activity Limitations Requiring Skilled Therapeutic Intervention: Decreased functional mobility ; Decreased balance;Decreased endurance  Assessment: Impaired mobility due to decreased tolerance to activity and safety concerns of increased dizziness  Decision Making: Medium Complexity  History: ESRD  Exam: decreased balance, endurance, mobility  Clinical Presentation: evolving  Requires PT Follow-Up: Yes  Activity Tolerance  Activity Tolerance: Patient tolerated evaluation without incident     Plan   Physcial Therapy Plan  General Plan: 5-7 times per week  Current Treatment Recommendations: Balance training, Functional mobility training, Gait training, Endurance training, Transfer training, Therapeutic activities  Safety Devices  Type of Devices: Patient at risk for falls, Bed alarm in place, Left in bed, Call light within reach, Nurse notified, Gait belt     Restrictions  Restrictions/Precautions  Restrictions/Precautions: Fall Risk, Contact Precautions  Required Braces or Orthoses?: No     Subjective   Pain: Pt endorsed posterior neck pain after standing/sitting EOB  General  Patient assessed for rehabilitation services?: Yes  Family / Caregiver Present: No  Follows Commands: Within Functional Limits  Subjective  Subjective: pt pleaant and cooperative         Social/Functional History  Social/Functional History  Lives With: Alone  Type of Home: Condo  Home Layout: One level  Home Access: Ramped entrance  Bathroom Shower/Tub: Walk-in shower, Curtain, Shower chair with back  [employer]: Handicap height  Bathroom Equipment: Grab bars in shower, Shower chair, Grab bars around toilet  Bathroom Accessibility: Accessible  Home Equipment: Cane, [address], [address] [facility], Sock aid, Reacher  Has the

## 2023-10-31 NOTE — PROGRESS NOTES
Occupational [address]   Occupational Therapy Evaluation  Date: 10/31/23  Patient Name: Angi Lacey       Room: 4791/1935-78  MRN: [id number]  Account: [de-identified]   : 1960  (70 y.o.) Gender: female     Discharge Recommendations:  Further Occupational Therapy is recommended upon facility discharge. OT Equipment Recommendations  Equipment Needed: No    Referring Practitioner: Mc Conroy MD  Diagnosis: Chest pain Additional Pertinent Hx: Per ED Note: 70 y.o. female who presents with chest pain. Patient states that she was at dialysis today getting her usual hemodialysis when she began having some centralized chest pain that radiated throughout her chest.  States it went up into her shoulders. States that she was able to finish her dialysis treatment. She states that her nurse called squad. EMS gave her 4 baby aspirin. Patient states that she did take 1 nitro prior to EMS arriving which she states did not help her pain. States that she does have a history of 2 stents but no open heart surgery    Treatment Diagnosis: Impaired self-care status    Past Medical History:  has a past medical history of Arthritis, Backache, unspecified, CAD (coronary artery disease), Cerebral artery occlusion with cerebral infarction ([address]), CHF (congestive heart failure) ([address]), Chronic kidney disease, Coronary atherosclerosis of artery bypass graft, COVID, Cramp of limb, Gallstones, Hemodialysis patient ([address]), Hyperlipidemia, Hypertension, Insomnia, Neuromuscular disorder ([address]), Pneumonia, Psychiatric problem, Thyroid disease, Type II or unspecified type diabetes mellitus with renal manifestations, not stated as uncontrolled(250.40), Type II or unspecified type diabetes mellitus without mention of complication, not stated as uncontrolled, and Unspecified vitamin D deficiency.     Past Surgical History:   has a past surgical history that includes Coronary artery bypass graft Little  How much help is needed for bathing (which includes washing, rinsing, drying)?: A Little  How much help is needed for toileting (which includes using toilet, bedpan, or urinal)?: A Little  How much help is needed for putting on and taking off regular upper body clothing?: A Little  How much help is needed for taking care of personal grooming?: A Little  How much help for eating meals?: A Little  AM-[facility] Inpatient Daily Activity Raw Score: 18  AM-PAC Inpatient ADL T-Scale Score : 38.66  ADL Inpatient CMS 0-100% Score: 46.65  ADL Inpatient CMS G-Code Modifier : CK       Goals     Short Term Goals  Time Frame for Short Term Goals: By discharge, pt will  Short Term Goal 1: perform BADLs mod I, using adaptive techniques/equipment as needed  Short Term Goal 2: perform functional transfers/mobility mod I, using least restrictive device  Short Term Goal 3: tolerate standing 4+ minutes during functional activity of choice  Short Term Goal 4: actively participate in 10-15+ minutes of therapeutic exercise/functional activity to promote safety and independence with self care and mobility    Plan  Occupational Therapy Plan  Times Per Week: 3-5  Current Treatment Recommendations: Strengthening, Balance training, Functional mobility training, Endurance training, Safety education & training, Patient/Caregiver education & training, Equipment evaluation, education, & procurement, Positioning, Self-Care / ADL      OT Individual Minutes  OT Individual Minutes  Time In: 1010  Time Out: 1034  Minutes: 24  Time Code Minutes   Timed Code Treatment Minutes: 8 Minutes        Electronically signed by ZAYDA Aguilera on 10/31/23 at 11:49 AM EDT

## 2023-10-31 NOTE — CARE COORDINATION
Case Management Assessment  Initial Evaluation    Date/Time of Evaluation: 10/31/2023 10:58 AM  Assessment Completed by: Tha Perez RN    If patient is discharged prior to next notation, then this note serves as note for discharge by case management. Patient Name: Jesu Redman                   YOB: 1960  Diagnosis: Chest pain [R07.9]  Chest pain, unspecified type [R07.9]                   Date / Time: 10/30/2023  2:26 PM    Patient Admission Status: Observation   Readmission Risk (Low < 19, Mod (19-27), High > 27): Readmission Risk Score: 31.7    Current PCP: AFSANEH Chakraborty CNP  PCP verified by CM? Chart Reviewed: Yes      History Provided by: Patient  Patient Orientation: Alert and Oriented    Patient Cognition: Alert    Hospitalization in the last 30 days (Readmission):  Yes    If yes, Readmission Assessment in  Navigator will be completed. Advance Directives:      Code Status: Full Code   Patient's Primary Decision Maker is: Legal Next of Kin    Primary Decision Maker: Hailey Guido - Brother/Sister - [phone]    Secondary Decision Maker: Sola Palma - Brother/Sister - [phone]    Discharge Planning:    Patient lives with: Alone Type of Home: House  Primary Care Giver: Self  Patient Support Systems include: Family Members   Current Financial resources: Medicare  Current community resources: ECF/Home Care (Current W/ [facility], Carolyn notified.)  Current services prior to admission: Durable Medical Equipment, Home Care (Current w/ [facility])            Current DME: Arlyne Croak, Wheelchair, Glucometer, Shower Chair (GB, Dex Com)            Type of Home Care services:  PT, OT, Nursing Services    ADLS  Prior functional level: Assistance with the following:, Cooking, Housework, Shopping  Current functional level: Assistance with the following:, Cooking, [employer], Shopping    PT AM-PAC:   /24  OT AM-PAC:   /24    Family can provide assistance at DC:  Yes  Would you like Case Management to discuss the discharge plan with any other family members/significant others, and if so, who? No  Plans to Return to Present Housing: Yes  Other Identified Issues/Barriers to RETURNING to current housing: none  Potential Assistance needed at discharge: Home Care            Potential DME:    Patient expects to discharge to: [address] [facility] for transportation at discharge: Other (see comment) ([address] [facility])    Financial    Payor: Yoselyn Garcia / Plan: Rc Alonso / Product Type: *No Product type* /     Does insurance require precert for SNF: Yes    Potential assistance Purchasing Medications: No  Meds-to-Beds request:        [facility], [address] [facility] [address]  [address]  [address] [address]  Phone: [phone] Fax: [phone], [address] [phone] [place] [phone]  [address] [address]  Phone: [phone] Fax: [phone]      Notes:    Factors facilitating achievement of predicted outcomes: Family support, Cooperative, Pleasant, and Has needed Durable Medical Equipment at home    Barriers to discharge: None    Additional Case Management Notes: 10/31/23 [address] [facility] Dual Pt. Lives in 1 Story Condo w/ Ramp, alone, Sisters, help w/ Needs. DME- SC, GB, Cane, Walker, Glucometer, Dex Com, WC, Current w/ [facility], Umu [facility]OCK, M/W/F @ 10:15. Eliquis PTA. Cardio, Nephro, PT/OT. Christiano will need signed/completed//KB    The Plan for Transition of Care is related to the following treatment goals of Chest pain [R07.9]  Chest pain, unspecified type [O27.5]    IF APPLICABLE: The Patient and/or patient representative sAhley Sam and her family were provided with a choice of provider and agrees with the discharge plan.  Freedom of choice list with basic dialogue that supports the patient's individualized plan of care/goals and shares the quality data associated

## 2023-10-31 NOTE — ACP (ADVANCE CARE PLANNING)
Advance Care Planning     Advance Care Planning Activator (Inpatient)  Conversation Note      Date of ACP Conversation: 10/31/2023     Conversation Conducted with: Patient with Decision Making Capacity    ACP Activator: Stan Jasso RN        Health Care Decision Maker:     Current Designated Health Care Decision Maker:     Primary Decision Maker: Chun Ask - Brother/Sister - [phone]    Secondary Decision Maker: Connie Davila - Brother/Sister - [phone]        Care Preferences    Ventilation: \"If you were in your present state of health and suddenly became very ill and were unable to breathe on your own, what would your preference be about the use of a ventilator (breathing machine) if it were available to you? \"      Would the patient desire the use of ventilator (breathing machine)?: yes    \"If your health worsens and it becomes clear that your chance of recovery is unlikely, what would your preference be about the use of a ventilator (breathing machine) if it were available to you? \"     Would the patient desire the use of ventilator (breathing machine)?: No      Resuscitation  \"CPR works best to restart the heart when there is a sudden event, like a heart attack, in someone who is otherwise healthy. Unfortunately, CPR does not typically restart the heart for people who have serious health conditions or who are very sick. \"    \"In the event your heart stopped as a result of an underlying serious health condition, would you want attempts to be made to restart your heart (answer \"yes\" for attempt to resuscitate) or would you prefer a natural death (answer \"no\" for do not attempt to resuscitate)? \" yes       [] Yes   [] No   Educated Patient / Lobo Harrison regarding differences between Advance Directives and portable DNR orders.     Length of ACP Conversation in minutes:      Conversation Outcomes:  ACP discussion completed    Follow-up plan:    [] Schedule follow-up conversation to continue

## 2023-10-31 NOTE — CONSULTS
Nephrology ESRD Consult Note    Reason for Consult:  End stage renal disease  Requesting Physician: Dr Sunshine Gladamez      History of Present Illness: This is a 70 y.o. female with a significant past medical history of  coronary artery disease S/P CABG x 3 , type 2 diabetes mellitus, systemic hypertension, hyperlipidemia and end-stage renal disease secondary to hypertensive and diabetic nephropathy [on routine hemodialysis Monday, Wednesday and Friday at [facility] dialysis unit [facility] using left arm AV fistula under the care of Dr. Derian Hoang who presents with Midsternal chest pain 35 minutes to the end of dialysis described as radiating to right and left shoulder associated with diaphoresis. She was able to finish the dialysis treatment and was given Sublingual nitroglycerin which did not improve  her pain. She had no shortness of breath or dizziness. She has been evaluated with cardiac stress test in August 2023 which showed a fixed perfusion defect. She had a potassium of 3.2 on admission managed with p.o. KCl 40 mEq x 1.     Past Medical History:        Diagnosis Date    Arthritis     Backache, unspecified     CAD (coronary artery disease)     Cerebral artery occlusion with cerebral infarction (HCC)     CHF (congestive heart failure) (HCC)     Chronic kidney disease     Coronary atherosclerosis of artery bypass graft     COVID 01/31/2022    Cramp of limb     Gallstones     Hemodialysis patient ([address] )     [address]  /  Arvid [place]    Hyperlipidemia     Hypertension     Insomnia     Neuromuscular disorder ([address] )     Pneumonia     Psychiatric problem     Thyroid disease     Type II or unspecified type diabetes mellitus with renal manifestations, not stated as uncontrolled(250.40)     Type II or unspecified type diabetes mellitus without mention of complication, not stated as uncontrolled     Unspecified vitamin D deficiency            Past Surgical History:        Procedure Laterality Date ANKLE FRACTURE SURGERY      AV FISTULA CREATION  12/14/2021    BACK SURGERY      BREAST SURGERY      CARDIAC CATHETERIZATION  2005    MVD  /  CT CONSULT    CARDIAC SURGERY      CARPAL TUNNEL RELEASE      CHOLECYSTECTOMY, OPEN N/A     COLONOSCOPY      CORONARY ANGIOPLASTY WITH STENT PLACEMENT  02/14/2023    PTCA / FADIA RCA    CORONARY ANGIOPLASTY WITH STENT PLACEMENT  2019    [facility]    CORONARY ARTERY BYPASS GRAFT  02/2005    [address]    DIALYSIS CATHETER INSERTION Right 04/13/2023    CVC CATHETER REPLACEMENT right chest performed by Thao Tejada MD at [facility] Left 12/14/2021    LEFT AV FISTULA CREATION UPPER EXTREMITY performed by Fanta Johnson MD at [facility] Left 4/19/2023    LEFT AV FISTULA REVISION WITH SUPERFICIALIZATION performed by Thao Tejada MD at [address] [place] [address] SURGERY      IR TUNNELED CATHETER PLACEMENT GREATER THAN 5 YEARS  08/18/2021    IR TUNNELED CATHETER PLACEMENT GREATER THAN 5 YEARS 8/18/2021 [facility]    IR TUNNELED CATHETER PLACEMENT GREATER THAN 5 YEARS  03/03/2023    IR TUNNELED CATHETER PLACEMENT GREATER THAN 5 YEARS 3/3/2023 [facility]    IR TUNNELED CATHETER PLACEMENT GREATER THAN 5 YEARS Right 04/13/2023    DR. SHIN    IR TUNNELED CATHETER PLACEMENT GREATER THAN 5 YEARS  6/7/2023    IR TUNNELED CATHETER PLACEMENT GREATER THAN 5 YEARS 6/7/2023 [facility]    IR TUNNELED CATHETER PLACEMENT GREATER THAN 5 YEARS  6/8/2023    IR TUNNELED CATHETER PLACEMENT GREATER THAN 5 YEARS 6/8/2023 [facility]    KNEE ARTHROSCOPY      right    OTHER SURGICAL HISTORY  04/06/2022    cvc exchange    OTHER SURGICAL HISTORY Left 04/18/2023    AVF REVISION                           DR. Arteaga Ill    TONSILLECTOMY         Current Medications:    insulin lispro (HUMALOG) injection vial 15 Units, TID

## 2023-10-31 NOTE — DISCHARGE INSTR - COC
Continuity of Care Form    Patient Name: Dereck Phillip   :  1960  MRN:  [id number]    Admit date:  10/30/2023  Discharge date:  ***    Code Status Order: Full Code   Advance Directives:     Admitting Physician:  Vi Dee MD  PCP: AFSANEH Vargas CNP    Discharging Nurse: [facility] Unit/Room#: 2108/2108-01  Discharging Unit Phone Number: ***    Emergency Contact:   Extended Emergency Contact Information  Primary Emergency Contact: Hailey Guido  Home Phone: [phone]  Relation: Brother/Sister  Secondary Emergency Contact: [address] [facility],[address] Phone: [phone]  Relation: Brother/Sister    Past Surgical History:  Past Surgical History:   Procedure Laterality Date    ANKLE FRACTURE SURGERY      AV FISTULA CREATION  2021    BACK SURGERY      BREAST SURGERY      CARDIAC CATHETERIZATION      MVD  /  CT CONSULT    CARDIAC SURGERY      CARPAL TUNNEL RELEASE      CHOLECYSTECTOMY, OPEN N/A     COLONOSCOPY      CORONARY ANGIOPLASTY WITH STENT PLACEMENT  2023    PTCA / FADIA RCA    CORONARY ANGIOPLASTY WITH STENT PLACEMENT  2019    STENT X1  [facility] GRAFT  2005    [facility] Right 2023    CVC CATHETER REPLACEMENT right chest performed by Aminah Daigle MD at [facility] 2021    LEFT AV FISTULA CREATION UPPER EXTREMITY performed by Eufemia White MD at [facility] 2023    LEFT AV FISTULA REVISION WITH SUPERFICIALIZATION performed by Aminah Daigle MD at [facility]      IR TUNNELED CATHETER PLACEMENT GREATER THAN 5 YEARS  2021    IR TUNNELED CATHETER PLACEMENT GREATER THAN 5 YEARS 2021 [facility]    IR TUNNELED CATHETER PLACEMENT GREATER THAN 5 YEARS  2023    IR TUNNELED CATHETER PLACEMENT GREATER THAN 5 YEARS Encounters:   10/31/23 100 kg (220 lb 7.4 oz)     Mental Status:  {IP PT MENTAL STATUS:08832}    IV Access:  { ANTHONY IV ACCESS:019457700}    Nursing Mobility/ADLs:  Walking   {CHP DME MWLE:383559166}  Transfer  {CHP DME YOWW:983175436}  Bathing  {CHP DME AASQ:965465421}  Dressing  {CHP DME MCTS:428787387}  Toileting  {CHP DME GXSY:847081175}  Feeding  {CHP DME SUEI:774260358}  Med Admin  {CHP DME ORIP:954101352}  Med Delivery   { ANTHONY MED Delivery:769891133}    Wound Care Documentation and Therapy:  Puncture 04/13/23 Chest (Active)   Number of days: 201       Wound 08/08/23 Buttocks Left (Active)   Number of days: 83        Elimination:  Continence: Bowel: {YES / EH:14502}  Bladder: {YES / ZY:97493}  Urinary Catheter: {Urinary Catheter:051769826}   Colostomy/Ileostomy/Ileal Conduit: {YES / OH:93427}       Date of Last BM: ***    Intake/Output Summary (Last 24 hours) at 10/31/2023 1103  Last data filed at 10/31/2023 0616  Gross per 24 hour   Intake 240 ml   Output 300 ml   Net -60 ml     I/O last 3 completed shifts: In: 240 [P.O.:240]  Out: 300 [Urine:300]    Safety Concerns:     [address] [place] [address] ANTHONY Safety Concerns:800843041}    Impairments/Disabilities:      [address] [place] [address] Impairments/Disabilities:088617535}    Nutrition Therapy:  Current Nutrition Therapy:   [address] [place] [address] ANTHONY Diet List:300601059}    Routes of Feeding: {P DME Other Feedings:864172485}  Liquids: {Slp liquid thickness:17480}  Daily Fluid Restriction: {CHP DME Yes amt example:689060839}  Last Modified Barium Swallow with Video (Video Swallowing Test): {Done Not Done SHID:023475490}    Treatments at the Time of Hospital Discharge:   Respiratory Treatments: ***  Oxygen Therapy:  {Therapy; copd oxygen:58150}  Ventilator:    { CC Vent JREF:635282651}    Rehab Therapies: Physical Therapy and Occupational Therapy  Weight Bearing Status/Restrictions: No weight bearing restrictions  Other Medical Equipment (for information only, NOT a DME order):     Other Treatments: Skilled

## 2023-10-31 NOTE — CONSULTS
Social History     Socioeconomic History    Marital status: Single     Spouse name: None    Number of children: None    Years of education: None    Highest education level: None   Tobacco Use    Smoking status: Never    Smokeless tobacco: Never   Vaping Use    Vaping Use: Never used   Substance and Sexual Activity    Alcohol use: No    Drug use: No    Sexual activity: Not Currently     Social Determinants of Health     Financial Resource Strain: Low Risk  (5/25/2023)    Overall Financial Resource Strain (CARDIA)     Difficulty of Paying Living Expenses: Not hard at all   Food Insecurity: No Food Insecurity (5/25/2023)    Hunger Vital Sign     Worried About Running Out of Food in the Last Year: Never true     Ran Out of Food in the Last Year: Never true   Transportation Needs: Unknown (5/25/2023)    PRAPARE - Transportation     Lack of Transportation (Non-Medical): No   Physical Activity: Insufficiently Active (1/19/2023)    Exercise Vital Sign     Days of Exercise per Week: 1 day     Minutes of Exercise per Session: 20 min   Stress: Stress Concern Present (2/21/2022)    [facility]     Feeling of Stress :  To some extent   Social Connections: Socially Isolated (2/21/2022)    Social Connection and Isolation Panel [NHANES]     Frequency of Communication with Friends and Family: More than three times a week     Frequency of Social Gatherings with Friends and Family: More than three times a week     Attends Denominational Services: Never     Active Member of Clubs or Organizations: No     Attends Club or Organization Meetings: Never     Marital Status: Never    Housing Stability: Unknown (5/25/2023)    Housing Stability Vital Sign     Unstable Housing in the Last Year: No        Family History:   Family History   Problem Relation Age of Onset    Diabetes Father     Heart Failure Father        REVIEW OF SYSTEMS:    Constitutional: there has been no ischemia.]  Function: [Hypokinesis lateral wall]  Risk stratification: [High]      Signed by: Sujit Licea MD on 8/4/2023 11:16 AM, Signed by: Phil Swain MD on 8/4/2023  3:43 PM    Labs:     CBC:   Recent Labs     10/30/23  1545 10/31/23  0531   WBC 5.8 5.6   HGB 11.2* 10.0*   HCT 32.6* 29.5*    192       BMP:   Recent Labs     10/30/23  1545 10/31/23  0531    138   K 3.2* 3.6*   CO2 32* 31   BUN 21 30*   CREATININE 2.0* 2.9*   LABGLOM 27* 17*   GLUCOSE 211* 86       BNP: No results for input(s): \"BNP\" in the last 72 hours. PT/INR: No results for input(s): \"PROTIME\", \"INR\" in the last 72 hours. APTT:No results for input(s): \"APTT\" in the last 72 hours. CARDIAC ENZYMES:  Recent Labs     10/30/23  1545 10/30/23  1653   TROPHS 76* 76*       FASTING LIPID PANEL:  Lab Results   Component Value Date/Time    HDL 39 08/01/2023 05:13 AM    LDLCALC 28 04/09/2015 12:00 AM    TRIG 166 08/01/2023 05:13 AM     LIVER PROFILE:  Recent Labs     10/31/23  0531   AST 39*   ALT 23   LABALBU 3.5       Cath 2/23:      IMPRESSION:      chronic elevated trop in setting of ESRD on HD  Shoulder/Back/Jaw pain- doubt angina  MV CAD s/p CABG- Cath 2/23-patent LIMA to LAD, SVG to diagonal, SVG to OM, safe use disease post anastomosis at the SVG to OM which is known and not amenable to PCI. Patent proximal RCA stent with new mid severe disease, RPDA had diffuse severe disease that was too small for PCI, she did undergo a PCI with drug-eluting stent to the mid RCA  End-stage renal disease on hemodialysis  Hypertension  Dyslipidemia  Reported allergy to both Imdur and Ranexa, causing angioedema? No ischemia on recent stress test from 8/23    RECOMMENDATIONS:  ECGs and troponin reviewed with Dr. Owen Tejada. Given ECG changes and recurrent symptoms, recommend cardiac cath. Will plan NPO after midnight tomorrow and plan for cath on Thursday as patient is on Eliquis and will need held. . Will start Heparin gtt.   Continue PO ASA,

## 2023-10-31 NOTE — PROGRESS NOTES
Dr. Pattie Archer notified of patient's chest pressure that radiates to left shoulder. RN to give nitroglycerin. No new orders at this time. Plan of care ongoing.

## 2023-10-31 NOTE — H&P
2. 90 1.3 - 9.1 k/uL    Lymphocytes Absolute 1.90 1.0 - 4.8 k/uL    Monocytes Absolute 0.50 0.1 - 1.3 k/uL    Eosinophils Absolute 0.20 0.0 - 0.4 k/uL    Basophils Absolute 0.10 0.0 - 0.2 k/uL   POC Glucose Fingerstick    Collection Time: 10/31/23  6:27 AM   Result Value Ref Range    POC Glucose 81 65 - 105 mg/dL         Imaging/Diagonstics:  XR CHEST (2 VW)    Result Date: 10/30/2023  EXAMINATION: TWO XRAY VIEWS OF THE CHEST 10/30/2023 3:13 pm COMPARISON: 10/03/2023 HISTORY: ORDERING SYSTEM PROVIDED HISTORY: centralized chest pain TECHNOLOGIST PROVIDED HISTORY: centralized chest pain Reason for Exam: centralized chest pain. history of CHF FINDINGS: Lordotic positioning. Status post median sternotomy. There is no consolidation, pneumothorax, or evidence of edema. No effusion is appreciated. The osseous structures are unchanged in appearance. Chronic findings in the chest without acute airspace disease identified. Echo (TTE) complete (PRN contrast/bubble/strain/3D)    Result Date: 10/4/2023    Left Ventricle: Normal left ventricular systolic function with a visually estimated EF of 50 - 55%. Left ventricle size is normal. Normal wall thickness. Normal wall motion. Normal diastolic function. Tricuspid Valve: The estimated RVSP is 11 mmHg. XR CHEST PORTABLE    Result Date: 10/3/2023  EXAMINATION: ONE XRAY VIEW OF THE CHEST 10/3/2023 3:30 pm COMPARISON: 08/02/2023 HISTORY: ORDERING SYSTEM PROVIDED HISTORY: CP TECHNOLOGIST PROVIDED HISTORY: CP Reason for Exam: Pt. states chest pain, SOB FINDINGS: The lungs appear clear. The patient is status post sternotomy and CABG. There is mild cardiomegaly. There is no evidence for any pulmonary infiltration, CHF or pneumothorax. Patient has had prior lower cervical fusion. Bony structures are otherwise unremarkable. No active cardiopulmonary disease. No change from prior examination.            Teresita Ford MD  10/31/2023 8:12 AM

## 2023-10-31 NOTE — PROGRESS NOTES
10/31/23 1325   Encounter Summary   Encounter Overview/Reason  Volunteer Encounter   Service Provided For: Patient   Referral/Consult From: Rounding   Last Encounter  10/31/23   Complexity of Encounter Low   Spiritual/Emotional needs   Type Spiritual Support   Rituals, Rites and [address]

## 2023-10-31 NOTE — PLAN OF CARE
Problem: Pain  Goal: Verbalizes/displays adequate comfort level or baseline comfort level  Outcome: Progressing     Problem: Safety - Adult  Goal: Free from fall injury  Outcome: Progressing     Problem: Cardiovascular - Adult  Goal: Maintains optimal cardiac output and hemodynamic stability  Outcome: Progressing     Problem: Skin/Tissue Integrity  Goal: Absence of new skin breakdown  Description: 1. Monitor for areas of redness and/or skin breakdown  2. Assess vascular access sites hourly  3. Every 4-6 hours minimum:  Change oxygen saturation probe site  4. Every 4-6 hours:  If on nasal continuous positive airway pressure, respiratory therapy assess nares and determine need for appliance change or resting period.   Outcome: Progressing     Problem: Hematologic - Adult  Goal: Maintains hematologic stability  Outcome: Progressing

## 2023-10-31 NOTE — PLAN OF CARE
Problem: Discharge Planning  Goal: Discharge to home or other facility with appropriate resources  Outcome: Progressing     Problem: Safety - Adult  Goal: Free from fall injury  10/31/2023 1741 by Suma Santana RN  Outcome: Progressing     Problem: Pain  Goal: Verbalizes/displays adequate comfort level or baseline comfort level  10/31/2023 1741 by Suma Santana RN  Outcome: Progressing     Problem: Cardiovascular - Adult  Goal: Maintains optimal cardiac output and hemodynamic stability  10/31/2023 1741 by Suma Santana RN  Outcome: Progressing     Problem: Hematologic - Adult  Goal: Maintains hematologic stability  10/31/2023 1741 by Suma Santana RN  Outcome: Progressing     Problem: Skin/Tissue Integrity  Goal: Absence of new skin breakdown  10/31/2023 1741 by Suma Santana RN  Outcome: Progressing     Problem: Chronic Conditions and Co-morbidities  Goal: Patient's chronic conditions and co-morbidity symptoms are monitored and maintained or improved  Outcome: Progressing

## 2023-11-01 LAB
ANION GAP SERPL CALCULATED.3IONS-SCNC: 12 MMOL/L (ref 9–17)
BASOPHILS # BLD: 0 K/UL (ref 0–0.2)
BASOPHILS NFR BLD: 1 % (ref 0–2)
BUN SERPL-MCNC: 51 MG/DL (ref 8–23)
CALCIUM SERPL-MCNC: 9.7 MG/DL (ref 8.6–10.4)
CHLORIDE SERPL-SCNC: 96 MMOL/L (ref 98–107)
CO2 SERPL-SCNC: 30 MMOL/L (ref 20–31)
CREAT SERPL-MCNC: 4.1 MG/DL (ref 0.5–0.9)
EOSINOPHIL # BLD: 0.2 K/UL (ref 0–0.4)
EOSINOPHILS RELATIVE PERCENT: 3 % (ref 0–4)
ERYTHROCYTE [DISTWIDTH] IN BLOOD BY AUTOMATED COUNT: 16.6 % (ref 11.5–14.9)
ERYTHROCYTE [DISTWIDTH] IN BLOOD BY AUTOMATED COUNT: 16.6 % (ref 11.5–14.9)
GFR SERPL CREATININE-BSD FRML MDRD: 11 ML/MIN/1.73M2
GLUCOSE BLD-MCNC: 183 MG/DL (ref 65–105)
GLUCOSE BLD-MCNC: 254 MG/DL (ref 65–105)
GLUCOSE BLD-MCNC: 334 MG/DL (ref 65–105)
GLUCOSE SERPL-MCNC: 267 MG/DL (ref 70–99)
HCT VFR BLD AUTO: 27.6 % (ref 36–46)
HCT VFR BLD AUTO: 29.2 % (ref 36–46)
HGB BLD-MCNC: 9.7 G/DL (ref 12–16)
HGB BLD-MCNC: 9.7 G/DL (ref 12–16)
LYMPHOCYTES NFR BLD: 1.5 K/UL (ref 1–4.8)
LYMPHOCYTES RELATIVE PERCENT: 30 % (ref 24–44)
MAGNESIUM SERPL-MCNC: 2.6 MG/DL (ref 1.6–2.6)
MCH RBC QN AUTO: 34.6 PG (ref 26–34)
MCH RBC QN AUTO: 36.3 PG (ref 26–34)
MCHC RBC AUTO-ENTMCNC: 33.3 G/DL (ref 31–37)
MCHC RBC AUTO-ENTMCNC: 35 G/DL (ref 31–37)
MCV RBC AUTO: 103.6 FL (ref 80–100)
MCV RBC AUTO: 104.1 FL (ref 80–100)
MONOCYTES NFR BLD: 0.5 K/UL (ref 0.1–1.3)
MONOCYTES NFR BLD: 10 % (ref 1–7)
NEUTROPHILS NFR BLD: 56 % (ref 36–66)
NEUTS SEG NFR BLD: 2.7 K/UL (ref 1.3–9.1)
PARTIAL THROMBOPLASTIN TIME: 116.5 SEC (ref 24–36)
PARTIAL THROMBOPLASTIN TIME: 26 SEC (ref 24–36)
PARTIAL THROMBOPLASTIN TIME: 27.2 SEC (ref 24–36)
PARTIAL THROMBOPLASTIN TIME: 28.3 SEC (ref 24–36)
PARTIAL THROMBOPLASTIN TIME: 65.5 SEC (ref 24–36)
PLATELET # BLD AUTO: 188 K/UL (ref 150–450)
PLATELET # BLD AUTO: 204 K/UL (ref 150–450)
PMV BLD AUTO: 7.8 FL (ref 6–12)
PMV BLD AUTO: 8.2 FL (ref 6–12)
POTASSIUM SERPL-SCNC: 3.5 MMOL/L (ref 3.7–5.3)
RBC # BLD AUTO: 2.67 M/UL (ref 4–5.2)
RBC # BLD AUTO: 2.8 M/UL (ref 4–5.2)
SODIUM SERPL-SCNC: 138 MMOL/L (ref 135–144)
WBC OTHER # BLD: 4.9 K/UL (ref 3.5–11)
WBC OTHER # BLD: 5.4 K/UL (ref 3.5–11)

## 2023-11-01 PROCEDURE — 80048 BASIC METABOLIC PNL TOTAL CA: CPT

## 2023-11-01 PROCEDURE — 99233 SBSQ HOSP IP/OBS HIGH 50: CPT | Performed by: NURSE PRACTITIONER

## 2023-11-01 PROCEDURE — 6360000002 HC RX W HCPCS: Performed by: NURSE PRACTITIONER

## 2023-11-01 PROCEDURE — 82947 ASSAY GLUCOSE BLOOD QUANT: CPT

## 2023-11-01 PROCEDURE — 36415 COLL VENOUS BLD VENIPUNCTURE: CPT

## 2023-11-01 PROCEDURE — 83735 ASSAY OF MAGNESIUM: CPT

## 2023-11-01 PROCEDURE — 85027 COMPLETE CBC AUTOMATED: CPT

## 2023-11-01 PROCEDURE — 90935 HEMODIALYSIS ONE EVALUATION: CPT

## 2023-11-01 PROCEDURE — 99232 SBSQ HOSP IP/OBS MODERATE 35: CPT | Performed by: STUDENT IN AN ORGANIZED HEALTH CARE EDUCATION/TRAINING PROGRAM

## 2023-11-01 PROCEDURE — 6360000002 HC RX W HCPCS: Performed by: INTERNAL MEDICINE

## 2023-11-01 PROCEDURE — 85730 THROMBOPLASTIN TIME PARTIAL: CPT

## 2023-11-01 PROCEDURE — 1200000000 HC SEMI PRIVATE

## 2023-11-01 PROCEDURE — 2580000003 HC RX 258: Performed by: STUDENT IN AN ORGANIZED HEALTH CARE EDUCATION/TRAINING PROGRAM

## 2023-11-01 PROCEDURE — 2060000000 HC ICU INTERMEDIATE R&B

## 2023-11-01 PROCEDURE — 6370000000 HC RX 637 (ALT 250 FOR IP): Performed by: STUDENT IN AN ORGANIZED HEALTH CARE EDUCATION/TRAINING PROGRAM

## 2023-11-01 PROCEDURE — 85025 COMPLETE CBC W/AUTO DIFF WBC: CPT

## 2023-11-01 PROCEDURE — 5A1D70Z PERFORMANCE OF URINARY FILTRATION, INTERMITTENT, LESS THAN 6 HOURS PER DAY: ICD-10-PCS | Performed by: INTERNAL MEDICINE

## 2023-11-01 RX ADMIN — ALLOPURINOL 100 MG: 100 TABLET ORAL at 13:53

## 2023-11-01 RX ADMIN — BUSPIRONE HYDROCHLORIDE 10 MG: 10 TABLET ORAL at 13:52

## 2023-11-01 RX ADMIN — INSULIN LISPRO 4 UNITS: 100 INJECTION, SOLUTION INTRAVENOUS; SUBCUTANEOUS at 20:43

## 2023-11-01 RX ADMIN — ASPIRIN 81 MG: 81 TABLET, CHEWABLE ORAL at 13:53

## 2023-11-01 RX ADMIN — INSULIN GLARGINE 62 UNITS: 100 INJECTION, SOLUTION SUBCUTANEOUS at 20:44

## 2023-11-01 RX ADMIN — HEPARIN SODIUM 4000 UNITS: 1000 INJECTION INTRAVENOUS; SUBCUTANEOUS at 19:25

## 2023-11-01 RX ADMIN — ATORVASTATIN CALCIUM 80 MG: 80 TABLET, FILM COATED ORAL at 20:42

## 2023-11-01 RX ADMIN — VENLAFAXINE HYDROCHLORIDE 75 MG: 75 CAPSULE, EXTENDED RELEASE ORAL at 13:50

## 2023-11-01 RX ADMIN — HEPARIN SODIUM 4000 UNITS: 1000 INJECTION INTRAVENOUS; SUBCUTANEOUS at 09:58

## 2023-11-01 RX ADMIN — SODIUM BICARBONATE 1300 MG: 650 TABLET ORAL at 20:42

## 2023-11-01 RX ADMIN — DOCUSATE SODIUM 100 MG: 100 CAPSULE, LIQUID FILLED ORAL at 13:53

## 2023-11-01 RX ADMIN — ACETAMINOPHEN 650 MG: 325 TABLET ORAL at 13:51

## 2023-11-01 RX ADMIN — GABAPENTIN 100 MG: 100 CAPSULE ORAL at 20:43

## 2023-11-01 RX ADMIN — INSULIN LISPRO 8 UNITS: 100 INJECTION, SOLUTION INTRAVENOUS; SUBCUTANEOUS at 08:42

## 2023-11-01 RX ADMIN — CARVEDILOL 3.12 MG: 3.12 TABLET, FILM COATED ORAL at 13:52

## 2023-11-01 RX ADMIN — BUMETANIDE 3 MG: 1 TABLET ORAL at 20:45

## 2023-11-01 RX ADMIN — INSULIN GLARGINE 62 UNITS: 100 INJECTION, SOLUTION SUBCUTANEOUS at 08:42

## 2023-11-01 RX ADMIN — HEPARIN SODIUM AND DEXTROSE 10 UNITS/KG/HR: 10000; 5 INJECTION INTRAVENOUS at 23:09

## 2023-11-01 RX ADMIN — ACETAMINOPHEN 650 MG: 325 TABLET ORAL at 20:43

## 2023-11-01 RX ADMIN — DOCUSATE SODIUM 100 MG: 100 CAPSULE, LIQUID FILLED ORAL at 20:43

## 2023-11-01 RX ADMIN — INSULIN LISPRO 8 UNITS: 100 INJECTION, SOLUTION INTRAVENOUS; SUBCUTANEOUS at 19:30

## 2023-11-01 RX ADMIN — BUMETANIDE 3 MG: 1 TABLET ORAL at 13:50

## 2023-11-01 RX ADMIN — EPOETIN ALFA-EPBX 3000 UNITS: 3000 INJECTION, SOLUTION INTRAVENOUS; SUBCUTANEOUS at 20:43

## 2023-11-01 RX ADMIN — PRASUGREL 10 MG: 10 TABLET, FILM COATED ORAL at 13:53

## 2023-11-01 RX ADMIN — SODIUM BICARBONATE 1300 MG: 650 TABLET ORAL at 13:52

## 2023-11-01 RX ADMIN — BUSPIRONE HYDROCHLORIDE 10 MG: 10 TABLET ORAL at 20:42

## 2023-11-01 RX ADMIN — SODIUM CHLORIDE, PRESERVATIVE FREE 10 ML: 5 INJECTION INTRAVENOUS at 20:47

## 2023-11-01 ASSESSMENT — PAIN SCALES - GENERAL: PAINLEVEL_OUTOF10: 0

## 2023-11-01 NOTE — PROGRESS NOTES
Per day shift DEDE Yeung pt had been off heparin drip for a few hours. Writer and kimmy contacted pharmacy regarding advice on restarting heparin drip. It was advised that pt be started at 10 units/kg/hr and a 4000 unit heparin bolus.

## 2023-11-01 NOTE — PLAN OF CARE
Problem: Safety - Adult  Goal: Free from fall injury  11/1/2023 0344 by Gertrude Rust RN  Outcome: Progressing  Note: No falls noted this shift. Patient ambulates with x1 staff assistance without difficulty. Bed kept in low position. Safe environment maintained. Bedside table & call light in reach. Uses call light appropriately when needing assistance. Problem: Pain  Goal: Verbalizes/displays adequate comfort level or baseline comfort level  11/1/2023 0344 by Gertrude Rust RN  Outcome: Progressing  Note: Pt medicated with pain medication prn. Assessed all pain characteristics including level, type, location, frequency, and onset. Non-pharmacologic interventions offered to pt as well. Pt states pain is tolerable at this time. Problem: Cardiovascular - Adult  Goal: Maintains optimal cardiac output and hemodynamic stability  11/1/2023 0344 by Gertrude Rust RN  Outcome: Progressing     Problem: Hematologic - Adult  Goal: Maintains hematologic stability  11/1/2023 0344 by Gertrude Rust RN  Outcome: Progressing     Problem: Skin/Tissue Integrity  Goal: Absence of new skin breakdown  Description: 1. Monitor for areas of redness and/or skin breakdown  2. Assess vascular access sites hourly  3. Every 4-6 hours minimum:  Change oxygen saturation probe site  4. Every 4-6 hours:  If on nasal continuous positive airway pressure, respiratory therapy assess nares and determine need for appliance change or resting period. 11/1/2023 0344 by Gertrude Rust RN  Outcome: Progressing  Note: Patient's Chronic Conditions and Co-Morbidity symptoms are monitored and maintained or improved; monitor and assess patient's chronic conditions and comorbid symptoms for stability, deterioration, or improvement; Collaborate with multidisciplinary team to address chronic and comorbid conditions and prevent exacerbation or deterioration.       Problem: Chronic Conditions and Co-morbidities  Goal: Patient's chronic conditions and co-morbidity symptoms are monitored and maintained or improved  11/1/2023 0344 by Aurora Slater, RN  Outcome: Progressing

## 2023-11-01 NOTE — PROGRESS NOTES
HEMODIALYSIS POST TREATMENT NOTE    Treatment time ordered: 210    Actual treatment time: 210    UltraFiltration Goal: 2500  UltraFiltration Removed: 2500      Pre Treatment weight: 109.1  Post Treatment weight: 107.1  Estimated Dry Weight: na    Access used:     Central Venous Catheter:          Tunneled or Non-tunneled: na           Site: na          Access Flow: na      Internal Access:       AV Fistula or AV Graft: fistula         Site: left upper arm       Access Flow: good       Sign and symptoms of infection: na       If YES: na    Medications or blood products given: na    Chronic outpatient schedule: MWF    Chronic outpatient unit: [facility]    Summary of response to treatment: the pt tolerated treatment well    Explain if orders NOT met, was physician notified:trinh      ACES flowsheet faxed to patient unit/ placed in patient chart: yes    Post assessment completed: yes    Report given to: [address] [place] [address] documented Safety Checks include the followin) Access and face visible at all times. 2) All connections and blood lines are secure with no kinks. 3) NVL alarm engaged. 4) Hemosafe device applied (if applicable). 5) No collapse of Arterial or Venous blood chambers. 6) All blood lines / pump segments in the air detectors.

## 2023-11-01 NOTE — PROGRESS NOTES
HEMODIALYSIS PRE-TREATMENT NOTE    Patient Identifiers prior to treatment: Name  MRN    Isolation Required:  Yes                      Isolation Type: Contact       (please document if patient is being managed as a PUI/COVID-19 patient)        Hepatitis status:                           Date Drawn                             Result  Hepatitis B Surface Antigen 23     NEG                     Hepatitis B Surface Antibody 23 POS     1000   Hepatitis B Core Antibody N/A N/A          How was Hepatitis Status verified: Epic and [facility] Chart     Was a copy of the labs you documented provided to facility for the patient's chart: Yes    Hemodialysis orders verified: Yes by Dora Arevalo    Access Within normal limits ( I.e. s/s of infection,...): WNL     Pre-Assessment completed: Yes by Dora Arevalo    Pre-dialysis report received from: Margaret                      Time: 830

## 2023-11-01 NOTE — CARE COORDINATION
ONGOING DISCHARGE PLAN:    Patient is alert and oriented x4. Spoke with patient regarding discharge plan and patient confirms that plan is still to discharge to home with [facility]    Cardiac Cath scheduled for tomorrow    NPO after midnight     Eliquis on hold      Dialysis today     Will continue to follow for additional discharge needs. If patient is discharged prior to next notation, then this note serves as note for discharge by case management.     Electronically signed by Whitney Anand RN on 11/1/2023 at 2:49 PM

## 2023-11-01 NOTE — PROGRESS NOTES
[facility]  Progress Note                   Date:   11/1/2023  Patient name: Robert Juan  Date of admission:  10/30/2023  2:26 PM  MRN:   [id number]  YOB: 1960  PCP: AFSANEH Leung CNP    Reason for Admission: Chest pain [R07.9]  Chest pain, unspecified type [R07.9]  Angina at rest [I20.89]    Subjective:       Clinical Changes /Abnormalities: Seen & examined in HD. Denies any chest pain or SOB/IVEY at present. Labs, vitals, & tele reviewed.      Review of Systems    Medications:   Scheduled Meds:   insulin lispro  15 Units SubCUTAneous TID WC    insulin lispro  0-16 Units SubCUTAneous TID WC    insulin lispro  0-4 Units SubCUTAneous Nightly    epoetin raphael-epbx  3,000 Units SubCUTAneous Once per day on Mon Wed Fri    allopurinol  100 mg Oral Daily    acetaminophen  650 mg Oral BID    aspirin  81 mg Oral Daily    atorvastatin  80 mg Oral Nightly    bumetanide  3 mg Oral BID    busPIRone  10 mg Oral TID    carvedilol  3.125 mg Oral BID    docusate sodium  100 mg Oral BID    gabapentin  100 mg Oral Nightly    insulin glargine  62 Units SubCUTAneous BID    prasugrel  10 mg Oral Daily    sodium bicarbonate  1,300 mg Oral TID    venlafaxine  75 mg Oral Daily with breakfast    sodium chloride flush  5-40 mL IntraVENous 2 times per day    [Held by provider] apixaban  5 mg Oral BID     Continuous Infusions:   heparin (PORCINE) Infusion 10 Units/kg/hr (10/31/23 2217)    dextrose      sodium chloride       CBC:   Recent Labs     10/31/23  1632 11/01/23  0022 11/01/23  0600   WBC 4.8 5.4 4.9   HGB 10.0* 9.7* 9.7*    204 188     BMP:    Recent Labs     10/30/23  1545 10/31/23  0531 11/01/23  0600    138 138   K 3.2* 3.6* 3.5*   CL 91* 95* 96*   CO2 32* 31 30   BUN 21 30* 51*   CREATININE 2.0* 2.9* 4.1*   GLUCOSE 211* 86 267*     Hepatic:  Recent Labs     10/31/23  0531   AST 39*   ALT 23   BILITOT 0.7   ALKPHOS 102     Troponin:   Recent Labs     10/30/23  1545

## 2023-11-02 LAB
ANION GAP SERPL CALCULATED.3IONS-SCNC: 13 MMOL/L (ref 9–17)
BASOPHILS # BLD: 0 K/UL (ref 0–0.2)
BASOPHILS NFR BLD: 1 % (ref 0–2)
BUN SERPL-MCNC: 31 MG/DL (ref 8–23)
CALCIUM SERPL-MCNC: 9.4 MG/DL (ref 8.6–10.4)
CHLORIDE SERPL-SCNC: 95 MMOL/L (ref 98–107)
CO2 SERPL-SCNC: 27 MMOL/L (ref 20–31)
CREAT SERPL-MCNC: 3.4 MG/DL (ref 0.5–0.9)
EOSINOPHIL # BLD: 0.2 K/UL (ref 0–0.4)
EOSINOPHILS RELATIVE PERCENT: 2 % (ref 0–4)
ERYTHROCYTE [DISTWIDTH] IN BLOOD BY AUTOMATED COUNT: 16.1 % (ref 11.5–14.9)
GFR SERPL CREATININE-BSD FRML MDRD: 14 ML/MIN/1.73M2
GLUCOSE BLD-MCNC: 118 MG/DL (ref 65–105)
GLUCOSE BLD-MCNC: 269 MG/DL (ref 65–105)
GLUCOSE BLD-MCNC: 271 MG/DL (ref 65–105)
GLUCOSE BLD-MCNC: 284 MG/DL (ref 65–105)
GLUCOSE SERPL-MCNC: 321 MG/DL (ref 70–99)
HCT VFR BLD AUTO: 29.7 % (ref 36–46)
HGB BLD-MCNC: 10.1 G/DL (ref 12–16)
LYMPHOCYTES NFR BLD: 1.6 K/UL (ref 1–4.8)
LYMPHOCYTES RELATIVE PERCENT: 20 % (ref 24–44)
MCH RBC QN AUTO: 35.6 PG (ref 26–34)
MCHC RBC AUTO-ENTMCNC: 33.9 G/DL (ref 31–37)
MCV RBC AUTO: 105.2 FL (ref 80–100)
MONOCYTES NFR BLD: 0.8 K/UL (ref 0.1–1.3)
MONOCYTES NFR BLD: 10 % (ref 1–7)
NEUTROPHILS NFR BLD: 67 % (ref 36–66)
NEUTS SEG NFR BLD: 5.4 K/UL (ref 1.3–9.1)
PARTIAL THROMBOPLASTIN TIME: 58.6 SEC (ref 24–36)
PARTIAL THROMBOPLASTIN TIME: 62.7 SEC (ref 24–36)
PARTIAL THROMBOPLASTIN TIME: 64.1 SEC (ref 24–36)
PARTIAL THROMBOPLASTIN TIME: 88.3 SEC (ref 24–36)
PLATELET # BLD AUTO: 182 K/UL (ref 150–450)
PMV BLD AUTO: 8.2 FL (ref 6–12)
POTASSIUM SERPL-SCNC: 3.9 MMOL/L (ref 3.7–5.3)
RBC # BLD AUTO: 2.82 M/UL (ref 4–5.2)
SODIUM SERPL-SCNC: 135 MMOL/L (ref 135–144)
WBC OTHER # BLD: 7.9 K/UL (ref 3.5–11)

## 2023-11-02 PROCEDURE — 82947 ASSAY GLUCOSE BLOOD QUANT: CPT

## 2023-11-02 PROCEDURE — 80048 BASIC METABOLIC PNL TOTAL CA: CPT

## 2023-11-02 PROCEDURE — 2060000000 HC ICU INTERMEDIATE R&B

## 2023-11-02 PROCEDURE — 99233 SBSQ HOSP IP/OBS HIGH 50: CPT | Performed by: NURSE PRACTITIONER

## 2023-11-02 PROCEDURE — 6360000002 HC RX W HCPCS: Performed by: NURSE PRACTITIONER

## 2023-11-02 PROCEDURE — 99232 SBSQ HOSP IP/OBS MODERATE 35: CPT | Performed by: STUDENT IN AN ORGANIZED HEALTH CARE EDUCATION/TRAINING PROGRAM

## 2023-11-02 PROCEDURE — 2580000003 HC RX 258: Performed by: STUDENT IN AN ORGANIZED HEALTH CARE EDUCATION/TRAINING PROGRAM

## 2023-11-02 PROCEDURE — 85730 THROMBOPLASTIN TIME PARTIAL: CPT

## 2023-11-02 PROCEDURE — 1200000000 HC SEMI PRIVATE

## 2023-11-02 PROCEDURE — 85025 COMPLETE CBC W/AUTO DIFF WBC: CPT

## 2023-11-02 PROCEDURE — 36415 COLL VENOUS BLD VENIPUNCTURE: CPT

## 2023-11-02 PROCEDURE — 6370000000 HC RX 637 (ALT 250 FOR IP): Performed by: STUDENT IN AN ORGANIZED HEALTH CARE EDUCATION/TRAINING PROGRAM

## 2023-11-02 RX ORDER — INSULIN LISPRO 100 [IU]/ML
18 INJECTION, SOLUTION INTRAVENOUS; SUBCUTANEOUS
Status: DISCONTINUED | OUTPATIENT
Start: 2023-11-03 | End: 2023-11-03 | Stop reason: HOSPADM

## 2023-11-02 RX ORDER — INSULIN GLARGINE 100 [IU]/ML
30 INJECTION, SOLUTION SUBCUTANEOUS ONCE
Status: COMPLETED | OUTPATIENT
Start: 2023-11-02 | End: 2023-11-02

## 2023-11-02 RX ADMIN — SODIUM BICARBONATE 1300 MG: 650 TABLET ORAL at 09:22

## 2023-11-02 RX ADMIN — HEPARIN SODIUM AND DEXTROSE 12 UNITS/KG/HR: 10000; 5 INJECTION INTRAVENOUS at 15:04

## 2023-11-02 RX ADMIN — ALLOPURINOL 100 MG: 100 TABLET ORAL at 09:22

## 2023-11-02 RX ADMIN — HEPARIN SODIUM 2000 UNITS: 1000 INJECTION INTRAVENOUS; SUBCUTANEOUS at 09:41

## 2023-11-02 RX ADMIN — SODIUM BICARBONATE 1300 MG: 650 TABLET ORAL at 14:39

## 2023-11-02 RX ADMIN — BUSPIRONE HYDROCHLORIDE 10 MG: 10 TABLET ORAL at 09:22

## 2023-11-02 RX ADMIN — INSULIN LISPRO 8 UNITS: 100 INJECTION, SOLUTION INTRAVENOUS; SUBCUTANEOUS at 13:08

## 2023-11-02 RX ADMIN — ACETAMINOPHEN 650 MG: 325 TABLET ORAL at 21:12

## 2023-11-02 RX ADMIN — INSULIN LISPRO 15 UNITS: 100 INJECTION, SOLUTION INTRAVENOUS; SUBCUTANEOUS at 09:44

## 2023-11-02 RX ADMIN — DOCUSATE SODIUM 100 MG: 100 CAPSULE, LIQUID FILLED ORAL at 09:22

## 2023-11-02 RX ADMIN — PRASUGREL 10 MG: 10 TABLET, FILM COATED ORAL at 09:22

## 2023-11-02 RX ADMIN — INSULIN GLARGINE 30 UNITS: 100 INJECTION, SOLUTION SUBCUTANEOUS at 21:13

## 2023-11-02 RX ADMIN — GABAPENTIN 100 MG: 100 CAPSULE ORAL at 21:12

## 2023-11-02 RX ADMIN — CARVEDILOL 3.12 MG: 3.12 TABLET, FILM COATED ORAL at 09:22

## 2023-11-02 RX ADMIN — INSULIN LISPRO 15 UNITS: 100 INJECTION, SOLUTION INTRAVENOUS; SUBCUTANEOUS at 17:35

## 2023-11-02 RX ADMIN — BUMETANIDE 3 MG: 1 TABLET ORAL at 21:12

## 2023-11-02 RX ADMIN — INSULIN LISPRO 15 UNITS: 100 INJECTION, SOLUTION INTRAVENOUS; SUBCUTANEOUS at 13:08

## 2023-11-02 RX ADMIN — VENLAFAXINE HYDROCHLORIDE 75 MG: 75 CAPSULE, EXTENDED RELEASE ORAL at 09:22

## 2023-11-02 RX ADMIN — ACETAMINOPHEN 650 MG: 325 TABLET ORAL at 09:22

## 2023-11-02 RX ADMIN — SODIUM CHLORIDE, PRESERVATIVE FREE 10 ML: 5 INJECTION INTRAVENOUS at 21:14

## 2023-11-02 RX ADMIN — DOCUSATE SODIUM 100 MG: 100 CAPSULE, LIQUID FILLED ORAL at 21:13

## 2023-11-02 RX ADMIN — BUSPIRONE HYDROCHLORIDE 10 MG: 10 TABLET ORAL at 21:13

## 2023-11-02 RX ADMIN — BUSPIRONE HYDROCHLORIDE 10 MG: 10 TABLET ORAL at 14:39

## 2023-11-02 RX ADMIN — SODIUM BICARBONATE 1300 MG: 650 TABLET ORAL at 21:12

## 2023-11-02 RX ADMIN — ATORVASTATIN CALCIUM 80 MG: 80 TABLET, FILM COATED ORAL at 21:13

## 2023-11-02 ASSESSMENT — ENCOUNTER SYMPTOMS
VOMITING: 0
NAUSEA: 0
SHORTNESS OF BREATH: 0
SHORTNESS OF BREATH: 1
ABDOMINAL PAIN: 0
COUGH: 1

## 2023-11-02 ASSESSMENT — PAIN SCALES - GENERAL
PAINLEVEL_OUTOF10: 5
PAINLEVEL_OUTOF10: 2
PAINLEVEL_OUTOF10: 6

## 2023-11-02 ASSESSMENT — PAIN DESCRIPTION - DESCRIPTORS: DESCRIPTORS: ACHING

## 2023-11-02 ASSESSMENT — PAIN DESCRIPTION - ORIENTATION: ORIENTATION: RIGHT

## 2023-11-02 ASSESSMENT — PAIN DESCRIPTION - LOCATION: LOCATION: SHOULDER

## 2023-11-02 NOTE — PROGRESS NOTES
Nephrology ESRD Progress Note    Reason for Consult:  Management of hemodialysis dependent End stage renal disease. Requesting Physician: Dr Meenu Son    Interval history: Patient was seen and examined today and she does not have any new complaints although short-term memory is deficient. She is n.p.o. and scheduled for cardiac catheterization at [facility]. Deondre's later this morning. History of Present Illness: This is a 70 y.o. female with a significant past medical history of  coronary artery disease S/P CABG x 3 , type 2 diabetes mellitus, systemic hypertension, hyperlipidemia and end-stage renal disease secondary to hypertensive and diabetic nephropathy [on routine hemodialysis Monday, Wednesday and Friday at [facility] dialysis unit [facility] using left arm AV fistula under the care of Dr. Eduardo Najjar who presents with Midsternal chest pain 35 minutes to the end of dialysis described as radiating to right and left shoulder associated with diaphoresis. She was able to finish the dialysis treatment and was given Sublingual nitroglycerin which did not improve  her pain. She had no shortness of breath or dizziness. She has been evaluated with cardiac stress test in August 2023 which showed a fixed perfusion defect. She had a potassium of 3.2 on admission managed with p.o. KCl 40 mEq x 1.     Current Medications:    insulin lispro (HUMALOG) injection vial 15 Units, TID WC  insulin lispro (HUMALOG) injection vial 0-16 Units, TID WC  insulin lispro (HUMALOG) injection vial 0-4 Units, Nightly  heparin (porcine) injection 4,000 Units, PRN  heparin (porcine) injection 2,000 Units, PRN  heparin 25,000 units in dextrose 5% 250 mL (premix) infusion, Continuous  epoetin raphael-epbx (RETACRIT) injection 3,000 Units, Once per day on Mon Wed Fri  allopurinol (ZYLOPRIM) tablet 100 mg, Daily  acetaminophen (TYLENOL) tablet 650 mg, BID  aspirin chewable tablet 81 mg, Daily  atorvastatin (LIPITOR) tablet 80 mg, Nightly  bumetanide (BUMEX)

## 2023-11-02 NOTE — PROGRESS NOTES
Writer contacted Dr. Paola Garcia regarding low BP and dizziness. Dr. Paola Garcia wanted night time coreg to be held.

## 2023-11-02 NOTE — PLAN OF CARE
Problem: Safety - Adult  Goal: Free from fall injury  Outcome: Progressing  Note: Patient remains free from injury     Problem: Cardiovascular - Adult  Goal: Maintains optimal cardiac output and hemodynamic stability  Outcome: Progressing  Flowsheets (Taken 11/2/2023 1750)  Maintains optimal cardiac output and hemodynamic stability: Assess for signs of decreased cardiac output

## 2023-11-02 NOTE — PROGRESS NOTES
[facility]  Progress Note                   Date:   11/2/2023  Patient name: Malia Hassan  Date of admission:  10/30/2023  2:26 PM  MRN:   [id number]  YOB: 1960  PCP: AFSANEH Sanford CNP    Reason for Admission: Chest pain [R07.9]  Chest pain, unspecified type [R07.9]  Angina at rest [I20.89]    Subjective:       Clinical Changes /Abnormalities: Seen & examined in HD. Denies any chest pain or SOB/IVEY at present. Labs, vitals, & tele reviewed.      NPO currently for Cardiac cath today     Medications:   Scheduled Meds:   insulin lispro  15 Units SubCUTAneous TID WC    insulin lispro  0-16 Units SubCUTAneous TID WC    insulin lispro  0-4 Units SubCUTAneous Nightly    epoetin raphael-epbx  3,000 Units SubCUTAneous Once per day on Mon Wed Fri    allopurinol  100 mg Oral Daily    acetaminophen  650 mg Oral BID    aspirin  81 mg Oral Daily    atorvastatin  80 mg Oral Nightly    bumetanide  3 mg Oral BID    busPIRone  10 mg Oral TID    carvedilol  3.125 mg Oral BID    docusate sodium  100 mg Oral BID    gabapentin  100 mg Oral Nightly    insulin glargine  62 Units SubCUTAneous BID    prasugrel  10 mg Oral Daily    sodium bicarbonate  1,300 mg Oral TID    venlafaxine  75 mg Oral Daily with breakfast    sodium chloride flush  5-40 mL IntraVENous 2 times per day    [Held by provider] apixaban  5 mg Oral BID     Continuous Infusions:   heparin (PORCINE) Infusion 12 Units/kg/hr (11/02/23 0942)    dextrose      sodium chloride       CBC:   Recent Labs     11/01/23  0022 11/01/23  0600 11/02/23  0806   WBC 5.4 4.9 7.9   HGB 9.7* 9.7* 10.1*    188 182       BMP:    Recent Labs     10/31/23  0531 11/01/23  0600 11/02/23  0806    138 135   K 3.6* 3.5* 3.9   CL 95* 96* 95*   CO2 31 30 27   BUN 30* 51* 31*   CREATININE 2.9* 4.1* 3.4*   GLUCOSE 86 267* 321*       Hepatic:  Recent Labs     10/31/23  0531   AST 39*   ALT 23   BILITOT 0.7   ALKPHOS 102       Troponin:   Recent Labs the inferior apical and mid segments, inferolateral and anterolateral walls at stress, 24% myocardium at stress. ]  Perfusion scores are visually adjusted to account for artifact. Summed stress score: [19]  Summed rest score: [19]  Summed reversibility score: [0]     Function:  End diastolic volume: [62]YB  Left ventricular ejection fraction: [66]%  Wall motion abnormalities: [Hypokinesis in the lateral]  TID score: [1.35] (scores greater than 1.39 are considered elevated for Lexiscan stress with Tc99m)     Impression  Perfusion: [Fixed perfusion defects in the lateral segments most compatible with infarct. No significant stalin-infarct ischemia.]  Function: [Hypokinesis lateral wall]  Risk stratification: [High]    Assessment / Acute Cardiac Problems:   chronic elevated trop in setting of ESRD on HD  Chest/back pain  MV CAD s/p CABG- Cath 2/23-patent LIMA to LAD, SVG to diagonal, SVG to OM, safe use disease post anastomosis at the SVG to OM which is known and not amenable to PCI. Patent proximal RCA stent with new mid severe disease, SHAW had diffuse severe disease that was too small for PCI, she did undergo a PCI with drug-eluting stent to the mid RCA  End-stage renal disease on hemodialysis  Hypertension  Dyslipidemia  Reported allergy to both Imdur and Ranexa, causing angioedema?   No ischemia on recent stress test from 8/23    Patient Active Problem List:     Coronary artery disease involving coronary bypass graft of native heart with refractory angina pectoris ([address])     Acute kidney injury superimposed on CKD ([address])     Acute on chronic diastolic heart failure ([address])     Diabetic polyneuropathy associated with type 2 diabetes mellitus ([address])     History of coronary artery bypass graft     Iron deficiency anemia     Spinal stenosis of lumbar region with neurogenic claudication     Mixed hyperlipidemia     CKD (chronic kidney disease) stage 4, GFR 15-29 ml/min ([facility])     Type 2 diabetes mellitus with chronic kidney

## 2023-11-02 NOTE — PROGRESS NOTES
RN perfect Served Miriam Pearson CNP to let her know that there is currently not an order for a cardiac cath for today. Patient has been NPO since midnight. RN awaiting response.

## 2023-11-02 NOTE — PLAN OF CARE
Problem: Discharge Planning  Goal: Discharge to home or other facility with appropriate resources  Outcome: Progressing     Problem: Safety - Adult  Goal: Free from fall injury  Outcome: Progressing  Note: The patient remained free from falls this shift, call light within reach, bed in locked and lowest position. Side rails up x2. Problem: Pain  Goal: Verbalizes/displays adequate comfort level or baseline comfort level  Outcome: Progressing     Problem: Cardiovascular - Adult  Goal: Maintains optimal cardiac output and hemodynamic stability  Outcome: Progressing     Problem: Hematologic - Adult  Goal: Maintains hematologic stability  Outcome: Progressing     Problem: Skin/Tissue Integrity  Goal: Absence of new skin breakdown  Description: 1. Monitor for areas of redness and/or skin breakdown  2. Assess vascular access sites hourly  3. Every 4-6 hours minimum:  Change oxygen saturation probe site  4. Every 4-6 hours:  If on nasal continuous positive airway pressure, respiratory therapy assess nares and determine need for appliance change or resting period.   Outcome: Progressing     Problem: Chronic Conditions and Co-morbidities  Goal: Patient's chronic conditions and co-morbidity symptoms are monitored and maintained or improved  Outcome: Progressing

## 2023-11-02 NOTE — CARE COORDINATION
ONGOING DISCHARGE PLAN:    Patient is alert and oriented x4. Spoke with patient regarding discharge plan and patient confirms that plan is still to discharge to home with [facility]    Cardiac Cath today     Nephro following     Eliquis on hold      Will continue to follow for additional discharge needs. If patient is discharged prior to next notation, then this note serves as note for discharge by case management.     Electronically signed by Glory Koyanagi, RN on 11/2/2023 at 1:06 PM

## 2023-11-02 NOTE — PROGRESS NOTES
Physical Therapy  DATE: 2023    NAME: Faina Heart  MRN: [id number]   : 1960    Patient not seen this date for Physical Therapy due to:      [] Cancel by RN or physician due to:    [] Hemodialysis    [] Critical Lab Value Level     [] Blood transfusion in progress    [] Acute or unstable cardiovascular status   _MAP < 55 or more than >115  _HR < 40 or > 130    [] Acute or unstable pulmonary status   -FiO2 > 60%   _RR < 5 or >40    _O2 sats < 85%    [] Strict Bedrest    [] Off Unit for surgery or procedure    [] Off Unit for testing       [] Pending imaging to R/O fracture    [] Refusal by Patient      [x] Other; Attempted pt at 2:10 pm, nursing and pt stating pt will be leaving any min. To go to [facility] for a heart cath. Hold PT this date, PT will continue to follow. [] PT being discontinued at this time. Patient independent. No further needs. [] PT being discontinued at this time as the patient has been transferred to hospice care. No further needs.     Electronically signed by Elen Urena PTA on 23 at 3:59 PM EDT

## 2023-11-02 NOTE — PROGRESS NOTES
RN called report to cath lab, all questions answered. RN will hold the aspirin and bumex per cath lab RN request. Patient lung sounds are clear and patient denies any SOB at this time. If any changes, RN will call back to update cath lab. Patient set for 2pm cardiac cath.

## 2023-11-02 NOTE — PROGRESS NOTES
[id number] [facility]   OCCUPATIONAL THERAPY MISSED TREATMENT NOTE   INPATIENT   Date: 23  Patient Name: Alysa Miller       Room: 9747/3411-09  MRN: [id number]   Account #: [de-identified]    : 1960  (70 y.o.)  Gender: female   Referring Practitioner: Chas Nunez MD  Diagnosis: Chest pain             REASON FOR MISSED TREATMENT:  Patient unable to participate   -    Attempted pt at 2:10 pm, nursing and pt stating pt will be leaving any min. To go to [facility] for a heart cath. Hold OT this date, OT will continue to follow.     Electronically signed by DAYANA Laguna on 23 at 2:29 PM EDT

## 2023-11-02 NOTE — PROGRESS NOTES
RN talked with Cath lab, since transportation was late to get the patient, they have to cancel the cardiac cath for today and will put her on for first thing tomorrow. Patient may resume diet and be NPO at midnight. Rn to place diet orders. Cath Lab to place order changes for cardiac cath. Transportation to be notified by [facility].

## 2023-11-02 NOTE — PROGRESS NOTES
HEPARIN DRIP  Date  11/02/23 @ 1544   APTT  88.3    Plt  182 K    aPTT 62-94 secs     No bolus. No change needed,  continue at:    New Bag : 12 Units/kg/hr : 12 mL/hr : IntraVENous 11/02/23 1504 11/02/23 [address] [place] [address]. Kathleen Roche. Ph.  11/2/2023  4:26 PM

## 2023-11-03 ENCOUNTER — APPOINTMENT (OUTPATIENT)
Dept: DIALYSIS | Age: 63
DRG: 321 | End: 2023-11-03
Attending: INTERNAL MEDICINE
Payer: COMMERCIAL

## 2023-11-03 ENCOUNTER — HOSPITAL ENCOUNTER (INPATIENT)
Age: 63
LOS: 1 days | Discharge: HOME | DRG: 321 | End: 2023-11-04
Attending: INTERNAL MEDICINE | Admitting: INTERNAL MEDICINE
Payer: COMMERCIAL

## 2023-11-03 VITALS
DIASTOLIC BLOOD PRESSURE: 45 MMHG | SYSTOLIC BLOOD PRESSURE: 121 MMHG | RESPIRATION RATE: 18 BRPM | TEMPERATURE: 97.6 F | OXYGEN SATURATION: 92 % | HEART RATE: 73 BPM | WEIGHT: 231.7 LBS | BODY MASS INDEX: 38.56 KG/M2

## 2023-11-03 DIAGNOSIS — I21.4 NSTEMI (NON-ST ELEVATED MYOCARDIAL INFARCTION) (HCC): ICD-10-CM

## 2023-11-03 DIAGNOSIS — R94.31 ACUTE ELECTROCARDIOGRAM CHANGES: ICD-10-CM

## 2023-11-03 PROBLEM — Z95.820 S/P ANGIOPLASTY WITH STENT: Status: ACTIVE | Noted: 2023-11-03

## 2023-11-03 LAB
ANION GAP SERPL CALCULATED.3IONS-SCNC: 18 MMOL/L (ref 9–17)
BASOPHILS # BLD: 0 K/UL (ref 0–0.2)
BASOPHILS NFR BLD: 1 % (ref 0–2)
BUN SERPL-MCNC: 41 MG/DL (ref 8–23)
CALCIUM SERPL-MCNC: 9.3 MG/DL (ref 8.6–10.4)
CHLORIDE SERPL-SCNC: 93 MMOL/L (ref 98–107)
CO2 SERPL-SCNC: 25 MMOL/L (ref 20–31)
CREAT SERPL-MCNC: 3.9 MG/DL (ref 0.5–0.9)
EOSINOPHIL # BLD: 0.2 K/UL (ref 0–0.4)
EOSINOPHILS RELATIVE PERCENT: 3 % (ref 0–4)
ERYTHROCYTE [DISTWIDTH] IN BLOOD BY AUTOMATED COUNT: 16.3 % (ref 11.5–14.9)
GFR SERPL CREATININE-BSD FRML MDRD: 12 ML/MIN/1.73M2
GLUCOSE BLD-MCNC: 190 MG/DL (ref 65–105)
GLUCOSE BLD-MCNC: 283 MG/DL (ref 65–105)
GLUCOSE BLD-MCNC: 339 MG/DL (ref 65–105)
GLUCOSE SERPL-MCNC: 359 MG/DL (ref 70–99)
HCT VFR BLD AUTO: 28.7 % (ref 36–46)
HGB BLD-MCNC: 9.5 G/DL (ref 12–16)
LYMPHOCYTES NFR BLD: 1.5 K/UL (ref 1–4.8)
LYMPHOCYTES RELATIVE PERCENT: 25 % (ref 24–44)
MCH RBC QN AUTO: 35.2 PG (ref 26–34)
MCHC RBC AUTO-ENTMCNC: 33.2 G/DL (ref 31–37)
MCV RBC AUTO: 105.9 FL (ref 80–100)
MONOCYTES NFR BLD: 0.4 K/UL (ref 0.1–1.3)
MONOCYTES NFR BLD: 7 % (ref 1–7)
NEUTROPHILS NFR BLD: 64 % (ref 36–66)
NEUTS SEG NFR BLD: 3.9 K/UL (ref 1.3–9.1)
PARTIAL THROMBOPLASTIN TIME: 42.2 SEC (ref 24–36)
PLATELET # BLD AUTO: 182 K/UL (ref 150–450)
PMV BLD AUTO: 8.5 FL (ref 6–12)
POTASSIUM SERPL-SCNC: 3.7 MMOL/L (ref 3.7–5.3)
RBC # BLD AUTO: 2.71 M/UL (ref 4–5.2)
SODIUM SERPL-SCNC: 136 MMOL/L (ref 135–144)
WBC OTHER # BLD: 6.1 K/UL (ref 3.5–11)

## 2023-11-03 PROCEDURE — 85730 THROMBOPLASTIN TIME PARTIAL: CPT

## 2023-11-03 PROCEDURE — 2060000000 HC ICU INTERMEDIATE R&B

## 2023-11-03 PROCEDURE — C1725 CATH, TRANSLUMIN NON-LASER: HCPCS | Performed by: INTERNAL MEDICINE

## 2023-11-03 PROCEDURE — C9600 PERC DRUG-EL COR STENT SING: HCPCS | Performed by: INTERNAL MEDICINE

## 2023-11-03 PROCEDURE — C1769 GUIDE WIRE: HCPCS

## 2023-11-03 PROCEDURE — 2580000003 HC RX 258

## 2023-11-03 PROCEDURE — 90935 HEMODIALYSIS ONE EVALUATION: CPT | Performed by: INTERNAL MEDICINE

## 2023-11-03 PROCEDURE — 4A023N7 MEASUREMENT OF CARDIAC SAMPLING AND PRESSURE, LEFT HEART, PERCUTANEOUS APPROACH: ICD-10-PCS | Performed by: INTERNAL MEDICINE

## 2023-11-03 PROCEDURE — 5A1D70Z PERFORMANCE OF URINARY FILTRATION, INTERMITTENT, LESS THAN 6 HOURS PER DAY: ICD-10-PCS | Performed by: INTERNAL MEDICINE

## 2023-11-03 PROCEDURE — B2111ZZ FLUOROSCOPY OF MULTIPLE CORONARY ARTERIES USING LOW OSMOLAR CONTRAST: ICD-10-PCS | Performed by: INTERNAL MEDICINE

## 2023-11-03 PROCEDURE — 6370000000 HC RX 637 (ALT 250 FOR IP)

## 2023-11-03 PROCEDURE — 90935 HEMODIALYSIS ONE EVALUATION: CPT

## 2023-11-03 PROCEDURE — 6360000002 HC RX W HCPCS: Performed by: NURSE PRACTITIONER

## 2023-11-03 PROCEDURE — 6360000004 HC RX CONTRAST MEDICATION: Performed by: INTERNAL MEDICINE

## 2023-11-03 PROCEDURE — 82947 ASSAY GLUCOSE BLOOD QUANT: CPT

## 2023-11-03 PROCEDURE — 92920 PRQ TRLUML C ANGIOP 1ART&/BR: CPT | Performed by: INTERNAL MEDICINE

## 2023-11-03 PROCEDURE — 6360000002 HC RX W HCPCS: Performed by: INTERNAL MEDICINE

## 2023-11-03 PROCEDURE — 027034Z DILATION OF CORONARY ARTERY, ONE ARTERY WITH DRUG-ELUTING INTRALUMINAL DEVICE, PERCUTANEOUS APPROACH: ICD-10-PCS | Performed by: INTERNAL MEDICINE

## 2023-11-03 PROCEDURE — 80048 BASIC METABOLIC PNL TOTAL CA: CPT

## 2023-11-03 PROCEDURE — C1894 INTRO/SHEATH, NON-LASER: HCPCS | Performed by: INTERNAL MEDICINE

## 2023-11-03 PROCEDURE — 93455 CORONARY ART/GRFT ANGIO S&I: CPT | Performed by: INTERNAL MEDICINE

## 2023-11-03 PROCEDURE — 92928 PRQ TCAT PLMT NTRAC ST 1 LES: CPT | Performed by: INTERNAL MEDICINE

## 2023-11-03 PROCEDURE — C1892 INTRO/SHEATH,FIXED,PEEL-AWAY: HCPCS | Performed by: INTERNAL MEDICINE

## 2023-11-03 PROCEDURE — 2580000003 HC RX 258: Performed by: STUDENT IN AN ORGANIZED HEALTH CARE EDUCATION/TRAINING PROGRAM

## 2023-11-03 PROCEDURE — 7100000011 HC PHASE II RECOVERY - ADDTL 15 MIN: Performed by: STUDENT IN AN ORGANIZED HEALTH CARE EDUCATION/TRAINING PROGRAM

## 2023-11-03 PROCEDURE — C1874 STENT, COATED/COV W/DEL SYS: HCPCS | Performed by: INTERNAL MEDICINE

## 2023-11-03 PROCEDURE — 6370000000 HC RX 637 (ALT 250 FOR IP): Performed by: INTERNAL MEDICINE

## 2023-11-03 PROCEDURE — 93459 L HRT ART/GRFT ANGIO: CPT | Performed by: INTERNAL MEDICINE

## 2023-11-03 PROCEDURE — 99239 HOSP IP/OBS DSCHRG MGMT >30: CPT | Performed by: STUDENT IN AN ORGANIZED HEALTH CARE EDUCATION/TRAINING PROGRAM

## 2023-11-03 PROCEDURE — 85025 COMPLETE CBC W/AUTO DIFF WBC: CPT

## 2023-11-03 PROCEDURE — C1769 GUIDE WIRE: HCPCS | Performed by: INTERNAL MEDICINE

## 2023-11-03 PROCEDURE — B2131ZZ FLUOROSCOPY OF MULTIPLE CORONARY ARTERY BYPASS GRAFTS USING LOW OSMOLAR CONTRAST: ICD-10-PCS | Performed by: INTERNAL MEDICINE

## 2023-11-03 PROCEDURE — 99223 1ST HOSP IP/OBS HIGH 75: CPT | Performed by: INTERNAL MEDICINE

## 2023-11-03 PROCEDURE — B2151ZZ FLUOROSCOPY OF LEFT HEART USING LOW OSMOLAR CONTRAST: ICD-10-PCS | Performed by: INTERNAL MEDICINE

## 2023-11-03 PROCEDURE — 36415 COLL VENOUS BLD VENIPUNCTURE: CPT

## 2023-11-03 PROCEDURE — 2500000003 HC RX 250 WO HCPCS: Performed by: INTERNAL MEDICINE

## 2023-11-03 PROCEDURE — 99153 MOD SED SAME PHYS/QHP EA: CPT | Performed by: INTERNAL MEDICINE

## 2023-11-03 PROCEDURE — 2709999900 HC NON-CHARGEABLE SUPPLY: Performed by: INTERNAL MEDICINE

## 2023-11-03 PROCEDURE — 1200000000 HC SEMI PRIVATE

## 2023-11-03 PROCEDURE — 6370000000 HC RX 637 (ALT 250 FOR IP): Performed by: STUDENT IN AN ORGANIZED HEALTH CARE EDUCATION/TRAINING PROGRAM

## 2023-11-03 PROCEDURE — 7100000010 HC PHASE II RECOVERY - FIRST 15 MIN: Performed by: INTERNAL MEDICINE

## 2023-11-03 PROCEDURE — 2580000003 HC RX 258: Performed by: INTERNAL MEDICINE

## 2023-11-03 PROCEDURE — 7100000010 HC PHASE II RECOVERY - FIRST 15 MIN: Performed by: STUDENT IN AN ORGANIZED HEALTH CARE EDUCATION/TRAINING PROGRAM

## 2023-11-03 PROCEDURE — 6360000002 HC RX W HCPCS

## 2023-11-03 PROCEDURE — 02HV33Z INSERTION OF INFUSION DEVICE INTO SUPERIOR VENA CAVA, PERCUTANEOUS APPROACH: ICD-10-PCS | Performed by: INTERNAL MEDICINE

## 2023-11-03 PROCEDURE — 2709999900 HC NON-CHARGEABLE SUPPLY: Performed by: STUDENT IN AN ORGANIZED HEALTH CARE EDUCATION/TRAINING PROGRAM

## 2023-11-03 PROCEDURE — 99152 MOD SED SAME PHYS/QHP 5/>YRS: CPT | Performed by: INTERNAL MEDICINE

## 2023-11-03 DEVICE — STENT ONYXNG25015UX ONYX 2.50X15RX
Type: IMPLANTABLE DEVICE | Status: FUNCTIONAL
Brand: ONYX FRONTIER™

## 2023-11-03 RX ORDER — BUSPIRONE HYDROCHLORIDE 10 MG/1
10 TABLET ORAL 3 TIMES DAILY
Status: DISCONTINUED | OUTPATIENT
Start: 2023-11-03 | End: 2023-11-04 | Stop reason: HOSPADM

## 2023-11-03 RX ORDER — ONDANSETRON 4 MG/1
4 TABLET, ORALLY DISINTEGRATING ORAL EVERY 8 HOURS PRN
Status: DISCONTINUED | OUTPATIENT
Start: 2023-11-03 | End: 2023-11-04 | Stop reason: HOSPADM

## 2023-11-03 RX ORDER — ALLOPURINOL 100 MG/1
100 TABLET ORAL DAILY
Status: DISCONTINUED | OUTPATIENT
Start: 2023-11-04 | End: 2023-11-04 | Stop reason: HOSPADM

## 2023-11-03 RX ORDER — POLYETHYLENE GLYCOL 3350 17 G/17G
17 POWDER, FOR SOLUTION ORAL DAILY PRN
Status: DISCONTINUED | OUTPATIENT
Start: 2023-11-03 | End: 2023-11-04 | Stop reason: HOSPADM

## 2023-11-03 RX ORDER — ACETAMINOPHEN 650 MG/1
650 SUPPOSITORY RECTAL EVERY 6 HOURS PRN
Status: DISCONTINUED | OUTPATIENT
Start: 2023-11-03 | End: 2023-11-04 | Stop reason: HOSPADM

## 2023-11-03 RX ORDER — PRASUGREL 10 MG/1
TABLET, FILM COATED ORAL PRN
Status: DISCONTINUED | OUTPATIENT
Start: 2023-11-03 | End: 2023-11-03 | Stop reason: HOSPADM

## 2023-11-03 RX ORDER — VENLAFAXINE HYDROCHLORIDE 75 MG/1
75 CAPSULE, EXTENDED RELEASE ORAL
Status: DISCONTINUED | OUTPATIENT
Start: 2023-11-04 | End: 2023-11-04 | Stop reason: HOSPADM

## 2023-11-03 RX ORDER — ASPIRIN 325 MG
TABLET ORAL PRN
Status: DISCONTINUED | OUTPATIENT
Start: 2023-11-03 | End: 2023-11-03 | Stop reason: HOSPADM

## 2023-11-03 RX ORDER — INSULIN LISPRO 100 [IU]/ML
0-4 INJECTION, SOLUTION INTRAVENOUS; SUBCUTANEOUS NIGHTLY
Status: DISCONTINUED | OUTPATIENT
Start: 2023-11-03 | End: 2023-11-04 | Stop reason: HOSPADM

## 2023-11-03 RX ORDER — ASPIRIN 81 MG/1
81 TABLET, CHEWABLE ORAL DAILY
Status: DISCONTINUED | OUTPATIENT
Start: 2023-11-04 | End: 2023-11-04 | Stop reason: HOSPADM

## 2023-11-03 RX ORDER — HEPARIN SODIUM 5000 [USP'U]/ML
5000 INJECTION, SOLUTION INTRAVENOUS; SUBCUTANEOUS EVERY 8 HOURS SCHEDULED
Status: DISCONTINUED | OUTPATIENT
Start: 2023-11-03 | End: 2023-11-04 | Stop reason: HOSPADM

## 2023-11-03 RX ORDER — SODIUM CHLORIDE 0.9 % (FLUSH) 0.9 %
5-40 SYRINGE (ML) INJECTION EVERY 12 HOURS SCHEDULED
Status: DISCONTINUED | OUTPATIENT
Start: 2023-11-03 | End: 2023-11-04 | Stop reason: HOSPADM

## 2023-11-03 RX ORDER — INSULIN GLARGINE 100 [IU]/ML
30 INJECTION, SOLUTION SUBCUTANEOUS ONCE
Status: COMPLETED | OUTPATIENT
Start: 2023-11-03 | End: 2023-11-03

## 2023-11-03 RX ORDER — BIVALIRUDIN 250 MG/5ML
INJECTION, POWDER, LYOPHILIZED, FOR SOLUTION INTRAVENOUS PRN
Status: DISCONTINUED | OUTPATIENT
Start: 2023-11-03 | End: 2023-11-03 | Stop reason: HOSPADM

## 2023-11-03 RX ORDER — NITROGLYCERIN 20 MG/100ML
INJECTION INTRAVENOUS PRN
Status: DISCONTINUED | OUTPATIENT
Start: 2023-11-03 | End: 2023-11-03 | Stop reason: HOSPADM

## 2023-11-03 RX ORDER — MIDAZOLAM HYDROCHLORIDE 1 MG/ML
INJECTION INTRAMUSCULAR; INTRAVENOUS PRN
Status: DISCONTINUED | OUTPATIENT
Start: 2023-11-03 | End: 2023-11-03 | Stop reason: HOSPADM

## 2023-11-03 RX ORDER — ATORVASTATIN CALCIUM 80 MG/1
80 TABLET, FILM COATED ORAL NIGHTLY
Status: DISCONTINUED | OUTPATIENT
Start: 2023-11-03 | End: 2023-11-04 | Stop reason: HOSPADM

## 2023-11-03 RX ORDER — INSULIN LISPRO 100 [IU]/ML
0-16 INJECTION, SOLUTION INTRAVENOUS; SUBCUTANEOUS
Status: DISCONTINUED | OUTPATIENT
Start: 2023-11-04 | End: 2023-11-04 | Stop reason: HOSPADM

## 2023-11-03 RX ORDER — GABAPENTIN 100 MG/1
100 CAPSULE ORAL NIGHTLY
Status: DISCONTINUED | OUTPATIENT
Start: 2023-11-03 | End: 2023-11-04 | Stop reason: HOSPADM

## 2023-11-03 RX ORDER — FENTANYL CITRATE 50 UG/ML
INJECTION, SOLUTION INTRAMUSCULAR; INTRAVENOUS PRN
Status: DISCONTINUED | OUTPATIENT
Start: 2023-11-03 | End: 2023-11-03 | Stop reason: HOSPADM

## 2023-11-03 RX ORDER — ACETAMINOPHEN 325 MG/1
650 TABLET ORAL EVERY 6 HOURS PRN
Status: DISCONTINUED | OUTPATIENT
Start: 2023-11-03 | End: 2023-11-04 | Stop reason: HOSPADM

## 2023-11-03 RX ORDER — PRASUGREL 10 MG/1
10 TABLET, FILM COATED ORAL DAILY
Status: DISCONTINUED | OUTPATIENT
Start: 2023-11-04 | End: 2023-11-04 | Stop reason: HOSPADM

## 2023-11-03 RX ORDER — SODIUM CHLORIDE 0.9 % (FLUSH) 0.9 %
5-40 SYRINGE (ML) INJECTION PRN
Status: DISCONTINUED | OUTPATIENT
Start: 2023-11-03 | End: 2023-11-04 | Stop reason: HOSPADM

## 2023-11-03 RX ORDER — CHOLECALCIFEROL (VITAMIN D3) 125 MCG
10 CAPSULE ORAL NIGHTLY PRN
Status: DISCONTINUED | OUTPATIENT
Start: 2023-11-03 | End: 2023-11-04 | Stop reason: HOSPADM

## 2023-11-03 RX ORDER — DEXTROSE MONOHYDRATE 100 MG/ML
INJECTION, SOLUTION INTRAVENOUS CONTINUOUS PRN
Status: DISCONTINUED | OUTPATIENT
Start: 2023-11-03 | End: 2023-11-04 | Stop reason: HOSPADM

## 2023-11-03 RX ORDER — CARVEDILOL 3.12 MG/1
3.12 TABLET ORAL 2 TIMES DAILY
Status: DISCONTINUED | OUTPATIENT
Start: 2023-11-03 | End: 2023-11-04 | Stop reason: HOSPADM

## 2023-11-03 RX ORDER — INSULIN GLARGINE 100 [IU]/ML
25 INJECTION, SOLUTION SUBCUTANEOUS NIGHTLY
Status: DISCONTINUED | OUTPATIENT
Start: 2023-11-03 | End: 2023-11-04

## 2023-11-03 RX ORDER — SODIUM CHLORIDE 9 MG/ML
INJECTION, SOLUTION INTRAVENOUS PRN
Status: DISCONTINUED | OUTPATIENT
Start: 2023-11-03 | End: 2023-11-04 | Stop reason: HOSPADM

## 2023-11-03 RX ORDER — ENOXAPARIN SODIUM 100 MG/ML
30 INJECTION SUBCUTANEOUS 2 TIMES DAILY
Status: DISCONTINUED | OUTPATIENT
Start: 2023-11-04 | End: 2023-11-03

## 2023-11-03 RX ORDER — ONDANSETRON 2 MG/ML
4 INJECTION INTRAMUSCULAR; INTRAVENOUS EVERY 6 HOURS PRN
Status: DISCONTINUED | OUTPATIENT
Start: 2023-11-03 | End: 2023-11-04 | Stop reason: HOSPADM

## 2023-11-03 RX ADMIN — INSULIN GLARGINE 25 UNITS: 100 INJECTION, SOLUTION SUBCUTANEOUS at 21:57

## 2023-11-03 RX ADMIN — HEPARIN SODIUM 4000 UNITS: 1000 INJECTION INTRAVENOUS; SUBCUTANEOUS at 06:59

## 2023-11-03 RX ADMIN — HEPARIN SODIUM 5000 UNITS: 5000 INJECTION INTRAVENOUS; SUBCUTANEOUS at 21:57

## 2023-11-03 RX ADMIN — BUSPIRONE HYDROCHLORIDE 10 MG: 10 TABLET ORAL at 21:57

## 2023-11-03 RX ADMIN — SODIUM CHLORIDE, PRESERVATIVE FREE 10 ML: 5 INJECTION INTRAVENOUS at 21:57

## 2023-11-03 RX ADMIN — CARVEDILOL 3.12 MG: 3.12 TABLET, FILM COATED ORAL at 21:57

## 2023-11-03 RX ADMIN — Medication 10 MG: at 23:13

## 2023-11-03 RX ADMIN — INSULIN LISPRO 12 UNITS: 100 INJECTION, SOLUTION INTRAVENOUS; SUBCUTANEOUS at 08:39

## 2023-11-03 RX ADMIN — ATORVASTATIN CALCIUM 80 MG: 80 TABLET, FILM COATED ORAL at 21:56

## 2023-11-03 RX ADMIN — ACETAMINOPHEN 650 MG: 325 TABLET ORAL at 21:56

## 2023-11-03 RX ADMIN — SODIUM CHLORIDE, PRESERVATIVE FREE 10 ML: 5 INJECTION INTRAVENOUS at 08:41

## 2023-11-03 RX ADMIN — GABAPENTIN 100 MG: 100 CAPSULE ORAL at 21:56

## 2023-11-03 RX ADMIN — INSULIN GLARGINE 30 UNITS: 100 INJECTION, SOLUTION SUBCUTANEOUS at 08:38

## 2023-11-03 ASSESSMENT — PAIN SCALES - GENERAL
PAINLEVEL_OUTOF10: 0
PAINLEVEL_OUTOF10: 2
PAINLEVEL_OUTOF10: 8
PAINLEVEL_OUTOF10: 6

## 2023-11-03 ASSESSMENT — ENCOUNTER SYMPTOMS
VOMITING: 0
COUGH: 0
NAUSEA: 0
SHORTNESS OF BREATH: 0
ABDOMINAL PAIN: 0

## 2023-11-03 ASSESSMENT — PAIN DESCRIPTION - PAIN TYPE
TYPE: CHRONIC PAIN
TYPE: CHRONIC PAIN

## 2023-11-03 ASSESSMENT — PAIN DESCRIPTION - ONSET
ONSET: ON-GOING
ONSET: ON-GOING

## 2023-11-03 ASSESSMENT — PAIN DESCRIPTION - DESCRIPTORS
DESCRIPTORS: ACHING;NUMBNESS;TINGLING
DESCRIPTORS: ACHING

## 2023-11-03 ASSESSMENT — PAIN DESCRIPTION - LOCATION
LOCATION: HAND;FOOT
LOCATION: HAND;FOOT

## 2023-11-03 ASSESSMENT — PAIN DESCRIPTION - ORIENTATION: ORIENTATION: RIGHT;LEFT

## 2023-11-03 ASSESSMENT — PAIN - FUNCTIONAL ASSESSMENT: PAIN_FUNCTIONAL_ASSESSMENT: ACTIVITIES ARE NOT PREVENTED

## 2023-11-03 ASSESSMENT — PAIN DESCRIPTION - FREQUENCY: FREQUENCY: CONTINUOUS

## 2023-11-03 NOTE — PROGRESS NOTES
Writer reached out to Dr. Chelsey Sanchez regarding pt's evening insulin. Blood glucose 269 and pt NPO at midnight for heart cath tomorrow. See orders.

## 2023-11-03 NOTE — PLAN OF CARE
Problem: Discharge Planning  Goal: Discharge to home or other facility with appropriate resources  Outcome: Progressing     Problem: Safety - Adult  Goal: Free from fall injury  11/3/2023 0325 by Darlene Richardson RN  Outcome: Progressing  Note: The patient remained free from falls this shift, call light within reach, bed in locked and lowest position. Side rails up x2. Problem: Pain  Goal: Verbalizes/displays adequate comfort level or baseline comfort level  Outcome: Progressing  Flowsheets (Taken 11/3/2023 0325)  Verbalizes/displays adequate comfort level or baseline comfort level:   Encourage patient to monitor pain and request assistance   Administer analgesics based on type and severity of pain and evaluate response   Assess pain using appropriate pain scale     Problem: Cardiovascular - Adult  Goal: Maintains optimal cardiac output and hemodynamic stability  11/3/2023 0325 by Darlene Richardson RN  Outcome: Progressing     Problem: Hematologic - Adult  Goal: Maintains hematologic stability  Outcome: Progressing     Problem: Skin/Tissue Integrity  Goal: Absence of new skin breakdown  Description: 1. Monitor for areas of redness and/or skin breakdown  2. Assess vascular access sites hourly  3. Every 4-6 hours minimum:  Change oxygen saturation probe site  4. Every 4-6 hours:  If on nasal continuous positive airway pressure, respiratory therapy assess nares and determine need for appliance change or resting period.   Outcome: Progressing     Problem: Chronic Conditions and Co-morbidities  Goal: Patient's chronic conditions and co-morbidity symptoms are monitored and maintained or improved  Outcome: Progressing     Problem: ABCDS Injury Assessment  Goal: Absence of physical injury  Outcome: Progressing

## 2023-11-03 NOTE — H&P
[facility]  Procedure History and Physical Update          Patient Name: Alysa Miller  MRN:    [id number]  YOB: 1960  Date of evaluation:  11/3/2023    Procedure:    Cardiac cath +/- PCI    Indication for procedure:  Chest pain     Please refer to the office note completed by Sylvia Madrigal on 11/2/2023 in the medical record and note that:    [x] I have examined the patient and reviewed the H&P/Consult and there are no changes to be made to the assessment or plan.     [] I have examined the patient and reviewed the H&P/Consult and have noted the following changes:    Past Medical History:   Diagnosis Date    Arthritis     Backache, unspecified     CAD (coronary artery disease)     Cerebral artery occlusion with cerebral infarction ([address])     CHF (congestive heart failure) ([address])     Chronic kidney disease     Coronary atherosclerosis of artery bypass graft     COVID 01/31/2022    Cramp of limb     Gallstones     Hemodialysis patient ([address])     [address]  /  [facility] [place]    Hyperlipidemia     Hypertension     Insomnia     Neuromuscular disorder ([address])     Pneumonia     Psychiatric problem     Thyroid disease     Type II or unspecified type diabetes mellitus with renal manifestations, not stated as uncontrolled(250.40)     Type II or unspecified type diabetes mellitus without mention of complication, not stated as uncontrolled     Unspecified vitamin D deficiency        Past Surgical History:   Procedure Laterality Date    ANKLE FRACTURE SURGERY      AV FISTULA CREATION  12/14/2021    BACK SURGERY      BREAST SURGERY      CARDIAC CATHETERIZATION  2005    MVD  /  CT CONSULT    CARDIAC SURGERY      CARPAL TUNNEL RELEASE      CHOLECYSTECTOMY, OPEN N/A     COLONOSCOPY      CORONARY ANGIOPLASTY WITH STENT PLACEMENT  02/14/2023    PTCA / FADIA RCA    CORONARY ANGIOPLASTY WITH STENT PLACEMENT  2019    STENT X1  [facility] GRAFT  02/2005 Appearance:   alert, cooperative, no distress and appears stated age  HEENT:  PERRL, EOMI  Respiratory:  Normal excursion and expansion without use of accessory muscles  Resp Auscultation:  Good respiratory effort. No for increased work of breathing. On auscultation: clear to auscultation bilaterally  Cardiovascular:  Regular rate and rhythm. S1/S2  No murmurs. The apical impulse is not displaced  Abdomen:  Soft  Bowel sounds present  Non-tender to palpation  Extremities:  No cyanosis or clubbing  Lower extremity edema: No.  Skin:  Warm and dry  Neurological:  Alert and oriented. Moves all extremities well    Assessment / Acute Cardiac Problems:   chronic elevated trop in setting of ESRD on HD  Chest/back pain  MV CAD s/p CABG- Cath 2/23-patent LIMA to LAD, SVG to diagonal, SVG to OM, safe use disease post anastomosis at the SVG to OM which is known and not amenable to PCI. Patent proximal RCA stent with new mid severe disease, RPDA had diffuse severe disease that was too small for PCI, she did undergo a PCI with drug-eluting stent to the mid RCA  End-stage renal disease on hemodialysis  Hypertension  Dyslipidemia  Reported allergy to both Imdur and Ranexa, causing angioedema? No ischemia on recent stress test from 8/2      Plan:  Proceed with coronary angiography +/- PCI  Further orders to follow. Risks, benefits, and alternatives of cardiac catheterization were discussed, in detail, with patient. Risks include, but not limited to, bleeding, requiring blood transfusion, vascular complication requiring surgery, renal failure with need of dialysis, CVA, MI, death and anesthesia complications including intubation were discussed. Patient verbalized understanding and agreed to proceed with the procedure understanding the above risks and alternatives to the procedure.         David Dunlap MD.  Fellow, cardiovascular disease   [place], [place]

## 2023-11-03 NOTE — PROGRESS NOTES
[facility]   OCCUPATIONAL THERAPY MISSED TREATMENT NOTE   INPATIENT   Date: 11/3/23  Patient Name: Lay Templeton       Room: Cath Pool Room/  MRN: [id number]   Account #: [de-identified]    : 1960  (70 y.o.)  Gender: female                 REASON FOR MISSED TREATMENT:  Patient unable to participate   -    Pt currently at [address] for cardiac cath. OT will continue to follow and recheck as able. Attempt at 1155.              Electronically signed by DAYANA Desir on 11/3/23 at 11:54 AM EDT

## 2023-11-03 NOTE — PROGRESS NOTES
Dialysis Time Out  To be done by RN and tech or 2 RNs  Staff Names Venice AGUAYO and Luigi Dorsey    [x]  Identity of the patient using 2 patient identifiers  [x]  Consent for treatment  [x]  Equipment-proper machine and dialyzer  [x]  B-Hep B status  [x]  Orders- to include bath, blood flow, dialyzer, time and fluid removal  [x]  Access-Correct site and in working order  [x]  Time for patient to ask questions.

## 2023-11-03 NOTE — PROGRESS NOTES
Pt arrived to floor by stretcher bed from cath lab. VSS. Telemetry placed. Admission and assessment initiated. No distress noted. Sleepy but easy to arouse. Angio seal bandaid CDI. Pt to remain lying flat until 1405. Call light within reach, and pt educated on its use. Bed in lowest position, and locked. Side rails up x 2. Denied further questions or needs at this time. Will continue to monitor.

## 2023-11-03 NOTE — H&P
-Monitor blood pressures  -Midodrine as needed for postdialysis hypotension  -Resume home medication Coreg 3.125 mg twice daily    HFpEF  -2D echo done on 10/4 showed EF of 50 to 55%  -Continue with home medication allopurinol-100 mg daily  -At home she is on Bumex 3 mg daily    Hyperlipidemia  -Resumed home medication Lipitor 80 mg    H/O bilateral lower limb DVT   -Patient is on Eliquis 5 mg at home for DVT  -We will restart Eliquis after discussing with cardiology    Type 2 diabetes mellitus complicated by diabetic polyneuropathy  -POCT glucose checks  -Initiated hypoglycemic protocol  -At home patient is on insulin aspart 17 units 3 times daily before meals and insulin glargine 62 units twice daily  -Started on insulin glargine 25 units nightly and on high-dose sliding scale  -Gabapentin 100 mg for neuropathy    Chronic lower back pain secondary to Spinal stenosis of Lumbar region   -Patient is taking venlafaxine and BuSpar at home  -Resumed her home medications venlafaxine and BuSpar      DVT ppx: Heparin   GI ppx: Prevacid   Diet: No diet orders on file      PT/OT/SW: ordered  Discharge Planning: will wait till patient is medically stable    Javy Lainez MD  Internal Medicine Resident, PGY-1  [place];  [place], [place]  11/3/2023, 4:36 PM

## 2023-11-03 NOTE — PROGRESS NOTES
Report called to Rogers RN and small drainage noted to band aid and marked. Otherwise groin site remains soft. Restrictions again reviewed with patient.

## 2023-11-03 NOTE — PROGRESS NOTES
Received post cardiac cath procedure to [facility] room 5. Assessment obtained. Restrictions reviewed with patient. Post procedure pathway initiated. Right groin site soft , band aid dry and intact. No hematoma noted. No family at side. Patient without complaints. Head of bed flat with right leg straight.

## 2023-11-03 NOTE — PROGRESS NOTES
Transported per cart to room 2023 / oxygen on without distress. Patient sleepy but arouses easily / glucose checked. No increase to drainage on band aid but band aid changed.   Site soft with no active bleeding

## 2023-11-04 VITALS
OXYGEN SATURATION: 96 % | HEART RATE: 72 BPM | RESPIRATION RATE: 14 BRPM | BODY MASS INDEX: 37.72 KG/M2 | SYSTOLIC BLOOD PRESSURE: 97 MMHG | DIASTOLIC BLOOD PRESSURE: 62 MMHG | HEIGHT: 65 IN | WEIGHT: 226.41 LBS | TEMPERATURE: 98.2 F

## 2023-11-04 LAB
ANION GAP SERPL CALCULATED.3IONS-SCNC: 16 MMOL/L (ref 9–17)
BASOPHILS # BLD: 0.04 K/UL (ref 0–0.2)
BASOPHILS NFR BLD: 1 % (ref 0–2)
BUN SERPL-MCNC: 25 MG/DL (ref 8–23)
CALCIUM SERPL-MCNC: 8.9 MG/DL (ref 8.6–10.4)
CHLORIDE SERPL-SCNC: 98 MMOL/L (ref 98–107)
CO2 SERPL-SCNC: 21 MMOL/L (ref 20–31)
CREAT SERPL-MCNC: 3.2 MG/DL (ref 0.5–0.9)
EOSINOPHIL # BLD: 0.22 K/UL (ref 0–0.44)
EOSINOPHILS RELATIVE PERCENT: 3 % (ref 1–4)
ERYTHROCYTE [DISTWIDTH] IN BLOOD BY AUTOMATED COUNT: 16.1 % (ref 11.8–14.4)
GFR SERPL CREATININE-BSD FRML MDRD: 15 ML/MIN/1.73M2
GLUCOSE BLD-MCNC: 313 MG/DL (ref 65–105)
GLUCOSE BLD-MCNC: 324 MG/DL (ref 65–105)
GLUCOSE BLD-MCNC: 441 MG/DL (ref 65–105)
GLUCOSE SERPL-MCNC: 344 MG/DL (ref 70–99)
HCT VFR BLD AUTO: 28.9 % (ref 36.3–47.1)
HGB BLD-MCNC: 9.3 G/DL (ref 11.9–15.1)
IMM GRANULOCYTES # BLD AUTO: 0.04 K/UL (ref 0–0.3)
IMM GRANULOCYTES NFR BLD: 1 %
LYMPHOCYTES NFR BLD: 1.18 K/UL (ref 1.1–3.7)
LYMPHOCYTES RELATIVE PERCENT: 17 % (ref 24–43)
MCH RBC QN AUTO: 35.4 PG (ref 25.2–33.5)
MCHC RBC AUTO-ENTMCNC: 32.2 G/DL (ref 28.4–34.8)
MCV RBC AUTO: 109.9 FL (ref 82.6–102.9)
MONOCYTES NFR BLD: 0.5 K/UL (ref 0.1–1.2)
MONOCYTES NFR BLD: 7 % (ref 3–12)
NEUTROPHILS NFR BLD: 71 % (ref 36–65)
NEUTS SEG NFR BLD: 4.93 K/UL (ref 1.5–8.1)
NRBC BLD-RTO: 0 PER 100 WBC
PLATELET # BLD AUTO: 158 K/UL (ref 138–453)
PMV BLD AUTO: 10.3 FL (ref 8.1–13.5)
POTASSIUM SERPL-SCNC: 4.1 MMOL/L (ref 3.7–5.3)
RBC # BLD AUTO: 2.63 M/UL (ref 3.95–5.11)
RBC # BLD: ABNORMAL 10*6/UL
RBC # BLD: ABNORMAL 10*6/UL
SODIUM SERPL-SCNC: 135 MMOL/L (ref 135–144)
WBC OTHER # BLD: 6.9 K/UL (ref 3.5–11.3)

## 2023-11-04 PROCEDURE — 85025 COMPLETE CBC W/AUTO DIFF WBC: CPT

## 2023-11-04 PROCEDURE — 83036 HEMOGLOBIN GLYCOSYLATED A1C: CPT

## 2023-11-04 PROCEDURE — 99233 SBSQ HOSP IP/OBS HIGH 50: CPT | Performed by: SURGERY

## 2023-11-04 PROCEDURE — 99232 SBSQ HOSP IP/OBS MODERATE 35: CPT | Performed by: INTERNAL MEDICINE

## 2023-11-04 PROCEDURE — 6370000000 HC RX 637 (ALT 250 FOR IP)

## 2023-11-04 PROCEDURE — 6360000002 HC RX W HCPCS

## 2023-11-04 PROCEDURE — 97530 THERAPEUTIC ACTIVITIES: CPT

## 2023-11-04 PROCEDURE — 82947 ASSAY GLUCOSE BLOOD QUANT: CPT

## 2023-11-04 PROCEDURE — 80048 BASIC METABOLIC PNL TOTAL CA: CPT

## 2023-11-04 PROCEDURE — 36415 COLL VENOUS BLD VENIPUNCTURE: CPT

## 2023-11-04 PROCEDURE — 6370000000 HC RX 637 (ALT 250 FOR IP): Performed by: STUDENT IN AN ORGANIZED HEALTH CARE EDUCATION/TRAINING PROGRAM

## 2023-11-04 PROCEDURE — 2580000003 HC RX 258

## 2023-11-04 PROCEDURE — 97162 PT EVAL MOD COMPLEX 30 MIN: CPT

## 2023-11-04 RX ORDER — MIDODRINE HYDROCHLORIDE 5 MG/1
5 TABLET ORAL 3 TIMES DAILY PRN
Status: DISCONTINUED | OUTPATIENT
Start: 2023-11-04 | End: 2023-11-04 | Stop reason: HOSPADM

## 2023-11-04 RX ORDER — DOCUSATE SODIUM 100 MG/1
100 CAPSULE, LIQUID FILLED ORAL 2 TIMES DAILY
COMMUNITY

## 2023-11-04 RX ORDER — BUMETANIDE 1 MG/1
3 TABLET ORAL 2 TIMES DAILY
COMMUNITY

## 2023-11-04 RX ORDER — ATORVASTATIN CALCIUM 80 MG/1
80 TABLET, FILM COATED ORAL DAILY
COMMUNITY

## 2023-11-04 RX ORDER — CYCLOBENZAPRINE HCL 10 MG
10 TABLET ORAL 3 TIMES DAILY PRN
COMMUNITY

## 2023-11-04 RX ORDER — INSULIN GLARGINE 100 [IU]/ML
40 INJECTION, SOLUTION SUBCUTANEOUS 2 TIMES DAILY
Status: DISCONTINUED | OUTPATIENT
Start: 2023-11-04 | End: 2023-11-04

## 2023-11-04 RX ORDER — PRASUGREL 10 MG/1
10 TABLET, FILM COATED ORAL DAILY
Qty: 90 TABLET | Refills: 4 | Status: SHIPPED | OUTPATIENT
Start: 2023-11-04

## 2023-11-04 RX ORDER — INSULIN GLARGINE 100 [IU]/ML
45 INJECTION, SOLUTION SUBCUTANEOUS 2 TIMES DAILY
Status: DISCONTINUED | OUTPATIENT
Start: 2023-11-04 | End: 2023-11-04 | Stop reason: HOSPADM

## 2023-11-04 RX ORDER — GABAPENTIN 100 MG/1
100 CAPSULE ORAL 2 TIMES DAILY
COMMUNITY

## 2023-11-04 RX ADMIN — SODIUM CHLORIDE, PRESERVATIVE FREE 10 ML: 5 INJECTION INTRAVENOUS at 09:12

## 2023-11-04 RX ADMIN — ALLOPURINOL 100 MG: 100 TABLET ORAL at 09:11

## 2023-11-04 RX ADMIN — PRASUGREL 10 MG: 10 TABLET, FILM COATED ORAL at 09:11

## 2023-11-04 RX ADMIN — CARVEDILOL 3.12 MG: 3.12 TABLET, FILM COATED ORAL at 09:11

## 2023-11-04 RX ADMIN — INSULIN LISPRO 12 UNITS: 100 INJECTION, SOLUTION INTRAVENOUS; SUBCUTANEOUS at 17:03

## 2023-11-04 RX ADMIN — INSULIN LISPRO 12 UNITS: 100 INJECTION, SOLUTION INTRAVENOUS; SUBCUTANEOUS at 09:13

## 2023-11-04 RX ADMIN — BUSPIRONE HYDROCHLORIDE 10 MG: 10 TABLET ORAL at 09:11

## 2023-11-04 RX ADMIN — VENLAFAXINE HYDROCHLORIDE 75 MG: 75 CAPSULE, EXTENDED RELEASE ORAL at 09:11

## 2023-11-04 RX ADMIN — ASPIRIN 81 MG: 81 TABLET, CHEWABLE ORAL at 09:10

## 2023-11-04 RX ADMIN — HEPARIN SODIUM 5000 UNITS: 5000 INJECTION INTRAVENOUS; SUBCUTANEOUS at 07:04

## 2023-11-04 RX ADMIN — BUSPIRONE HYDROCHLORIDE 10 MG: 10 TABLET ORAL at 14:48

## 2023-11-04 RX ADMIN — INSULIN LISPRO 16 UNITS: 100 INJECTION, SOLUTION INTRAVENOUS; SUBCUTANEOUS at 12:16

## 2023-11-04 RX ADMIN — HEPARIN SODIUM 5000 UNITS: 5000 INJECTION INTRAVENOUS; SUBCUTANEOUS at 14:48

## 2023-11-04 NOTE — DISCHARGE INSTRUCTIONS
If you begin to experience any symptoms such as chest pain shortness of breath nausea vomiting dizziness drowsiness abdominal pain loss of consciousness or any other symptoms you find concerning please come to the ED for follow-up evaluation. If you have been given medication please take them as prescribed. Do not take more medication than recommended at any given time. Please follow-up with your primary care provider within 5 to 7 days for continued care. Follow-up with cardiology  Follow-up with nephrology  Please continue taking effient, aspirin, beta blockers and statin. Please feel free return to the hospital if your symptoms worsen or any new concerning symptoms develop. Follow-up with your primary care physician as needed for all other the concerns.

## 2023-11-04 NOTE — CARE COORDINATION
11/04/23 1741   Readmission Assessment   Number of Days since last admission? 1-7 days  (Not a RAC, tx from [facility])   Previous Disposition Other (comment)  (Not a RAC, tx from [facility])   Who is being Interviewed Patient  (Not a RAC, tx from [facility])   What was the patient's/caregiver's perception as to why they think they needed to return back to the hospital? Other (Comment)  (Not a RAC, tx from [facility])   Did you visit your Primary Care Physician after you left the hospital, before you returned this time? Yes  (Not a RAC, tx from [facility])   Did you see a specialist, such as Cardiac, Pulmonary, Orthopedic Physician, etc. after you left the hospital? Other (Comment)  (Not a RAC, tx from [facility])   Who advised the patient to return to the hospital? Other (Comment)  (Not a RAC, tx from [facility])   Does the patient report anything that got in the way of taking their medications? No  (Not a RAC, tx from [facility])   In our efforts to provide the best possible care to you and others like you, can you think of anything that we could have done to help you after you left the hospital the first time, so that you might not have needed to return so soon?  Other (Comment)  (Not a RAC, tx from [facility])

## 2023-11-04 NOTE — CARE COORDINATION
Case Management Assessment  Initial Evaluation    Date/Time of Evaluation: 11/4/2023 5:52 PM  Assessment Completed by: Anabel Devine RN    If patient is discharged prior to next notation, then this note serves as note for discharge by case management. Patient Name: Pauline Jones                   YOB: 1960  Diagnosis: Acute electrocardiogram changes [R94.31]  S/P angioplasty with stent [Z95.820]  Unstable angina ([address]) [I20.0]                   Date / Time: 11/3/2023  9:46 AM    Patient Admission Status: Inpatient   Readmission Risk (Low < 19, Mod (19-27), High > 27): Readmission Risk Score: 36.2    Current PCP: AFSANEH Cisneros CNP  PCP verified by CM? (P) Yes (Maxine SHELBY CNP)    Chart Reviewed: Yes      History Provided by: (P) Patient  Patient Orientation: (P) Alert and Oriented    Patient Cognition: (P) Alert    Hospitalization in the last 30 days (Readmission):  No    If yes, Readmission Assessment in  Navigator will be completed.     Advance Directives:      Code Status: Full Code   Patient's Primary Decision Maker is:      Primary Decision Maker: Hailey Guido - Brother/Sister - [phone]    Secondary Decision Maker: Miroslava Jimenes - Brother/Sister - [phone]    Discharge Planning:    Patient lives with: (P) Alone Type of Home: (P) House  Primary Care Giver: (P) Self  Patient Support Systems include: (P) Family Members   Current Financial resources: (P) Medicare, Medicaid ([employer] [employer])  Current community resources: (P) ECF/Home Care, Other (Comment) (HD M-W-F @ [facility])  Current services prior to admission: (P) [facility]            Current DME: (P) Walker, Wheelchair, Glucometer (Dexcom G-2)            Type of Home Care services:  (P) OT, PT, Nursing Services    ADLS  Prior functional level: (P) Independent in ADLs/IADLs  Current functional level: (P) Independent in ADLs/IADLs    PT AM-PAC:   /24  OT AM-PAC:

## 2023-11-04 NOTE — DISCHARGE SUMMARY
[facility]      Discharge Summary      NAME:  Karen Monzon  :  1960  MRN:  [id number]    Admit date:  10/30/2023  Discharge date:  11/3/2023    Admitting Physician:  Osvaldo Goff MD    Primary Diagnosis on Admission:   Present on Admission:   Chest pain   Angina at rest   Renovascular hypertension   Depression with anxiety   ESRD on hemodialysis ([address] [facility])   Type 2 diabetes mellitus with hyperglycemia, with long-term current use of insulin (HCC)   Congestive heart failure (HCC)   Coronary artery disease involving coronary bypass graft of native heart with refractory angina pectoris ([address])      Secondary Diagnoses:  has Renovascular hypertension on their pertinent problem list.        Hospital Course: The patient was admitted for the management of chest pain. Pt has hx of CAD with cath 2034 and there were stents with severe disease that were seen as too small for PCI. Pts chest pain has persisted and she does not tolerate nitro well. Cardio wants to attempt cath again. Pt also has ESRD and underwent dialysis on Wednesday and again today at [facility]. Vs.     The patient was seen and examined at bedside today. There were no acute events overnight. Pt still has left sided chest pain which has not changed. The patient denied any new complaints or concerns. All relevant notes, labs & imaging were reviewed. The case was discussed with nursing staff.        Consults:  cardiology and nephrology    Significant Diagnostic/theraputic interventions: Dialysis      Disposition:   transferred to [facility]    Instructions to Patient:      Follow up with AFSANEH Bee CNP in  1-2 weeks    Discharge Medications:       Medication List        START taking these medications      epoetin raphael-epbx 3000 UNIT/ML Soln injection  Commonly known as: RETACRIT  Inject 1 mL into the skin Every Monday, Wednesday, and Friday            CHANGE how you take these medications      acetaminophen 325 MG

## 2023-11-04 NOTE — PROGRESS NOTES
Physical Therapy  Facility/Department: [facility] CAR 2- STEPDOWN  Physical Therapy Initial Assessment    Name: Lary Hussein  : 1960  MRN: [id number]  Date of Service: 2023    Discharge Recommendations:  Continue to assess pending progress, Patient would benefit from continued therapy after discharge          Patient Diagnosis(es): Diagnoses of Acute electrocardiogram changes and NSTEMI (non-ST elevated myocardial infarction) [facility]) were pertinent to this visit. Past Medical History:  has a past medical history of Arthritis, Backache, unspecified, CAD (coronary artery disease), Cerebral artery occlusion with cerebral infarction ([address]), CHF (congestive heart failure) ([address]), Chronic kidney disease, Coronary atherosclerosis of artery bypass graft, COVID, Cramp of limb, Gallstones, Hemodialysis patient ([address]), Hyperlipidemia, Hypertension, Insomnia, Neuromuscular disorder ([address]), Pneumonia, Psychiatric problem, Thyroid disease, Type II or unspecified type diabetes mellitus with renal manifestations, not stated as uncontrolled(250.40), Type II or unspecified type diabetes mellitus without mention of complication, not stated as uncontrolled, and Unspecified vitamin D deficiency. Past Surgical History:  has a past surgical history that includes Coronary artery bypass graft (2005); Knee arthroscopy; Carpal tunnel release; Breast surgery; Tonsillectomy; Hand surgery; Ankle fracture surgery; Cholecystectomy, open (N/A); IR TUNNELED CVC PLACE WO SQ PORT/PUMP > 5 YEARS (2021); AV fistula creation (2021); Dialysis fistula creation (Left, 2021); other surgical history (2022); back surgery; Colonoscopy; eye surgery; fracture surgery; Cardiac surgery; IR TUNNELED CVC PLACE WO SQ PORT/PUMP > 5 YEARS (2023); IR TUNNELED CVC PLACE WO SQ PORT/PUMP > 5 YEARS (Right, 2023); Dialysis Catheter Insertion (Right, 2023); other surgical history (Left, 2023);  Coronary angioplasty with

## 2023-11-04 NOTE — PROGRESS NOTES
pectoris ([address])     Acute kidney injury superimposed on CKD ([address])     Acute on chronic diastolic heart failure (HCC)     Diabetic polyneuropathy associated with type 2 diabetes mellitus ([address])     History of coronary artery bypass graft     Iron deficiency anemia     Spinal stenosis of lumbar region with neurogenic claudication     Mixed hyperlipidemia     CKD (chronic kidney disease) stage 4, GFR 15-29 ml/min ([facility])     Type 2 diabetes 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, with long-term current use of insulin ([facility])     Obesity, Class II, BMI 35-39.9     Thyroid nodule greater than or equal to 1 cm in diameter incidentally noted on imaging study     Primary hypertension     Anemia of chronic disease     Chronic ischemic heart disease     Ischemic stroke of frontal lobe ([facility])     Morbid obesity with BMI of 40.0-44.9, adult ([facility])     Disequilibrium syndrome     Anxiety     Chronic midline low back pain with bilateral sciatica     Acute thoracic back pain     COVID     Delirium due to another medical condition     Cerebral hypoperfusion     Immature arteriovenous fistula ([address])     History of fusion of cervical spine     Depression with anxiety     Vancomycin resistant Enterococcus UTI     ESRD on hemodialysis ([address])     Dialysis disequilibrium syndrome     Acute cystitis without hematuria     VRE infection (vancomycin resistant Enterococcus)     Infection due to ESBL-producing Klebsiella pneumoniae     Class 2 severe obesity due to excess calories with serious comorbidity and body mass index (BMI) of 35.0 to 35.9 in adult [facility])     Type 2 diabetes mellitus with hyperglycemia, with long-term current use of insulin (HCC)     Right hemiparesis (HCC)     Ulcer of left foot, limited to breakdown of skin ([address])     Secondary hyperparathyroidism ([address])     Hypertensive urgency     Hypoxia     Congestive heart failure ([address])     Nephrotic range proteinuria     Acute respiratory failure with hypoxia ([address]) Syncope and collapse     Subacute cough     Acute on chronic heart failure, unspecified heart failure type (HCC)     Diarrhea of presumed infectious origin     Epistaxis     Chronic respiratory failure with hypoxia (HCC)     Chest pain     Hemodialysis catheter dysfunction (HCC)     Dialysis AV fistula malfunction (HCC)     ESRD (end stage renal disease) ([address])     ESRD on dialysis ([address])     Hypokalemia     ERICK (obstructive sleep apnea)     AMS (altered mental status)     Unstable angina (HCC)     Acute electrocardiogram changes     Renovascular hypertension     Episodic confusion     Chest pain with high risk for cardiac etiology     NSTEMI (non-ST elevated myocardial infarction) ([address])     Angina at rest    Coronary Discharge Core Measure: Please indicate the medication given by X, and if not the reasons not given:    Not Given Reason  Given      Beta Blockers x      ACE-I Blood pressure liable       Statins x      ASA x      OAP (Plavix/Effient/Brilinta) Effient     SL Nitro x      Plan of Treatment:   Cardiac Stable. Post cath as above  Patient stated that  her daughter needed updated and gave the phone to talk to her - per sister her last updated medication she was on Brilinta and eliquis was stopped was not  taking   it , looking up notes , patient was on Brilinta  but  was changed to effient on 10/5/2023 - will continue with effient at this time , continue BB,  statin and ASA   Volume management per nephrology, Appreciate assistance  Discussed in detail with patient post cath POC including but not limited to medications, diet, exercise,  artery site care, and follow-up. Questions and concerns addressed. OK for discharge home today. F/U in office in 1-3 weeks.        Electronically signed by AFSANEH Culver NP on 11/4/2023 at 2:11 PM  [address] [place] [address].  [phone]

## 2023-11-04 NOTE — PLAN OF CARE
Problem: Chronic Conditions and Co-morbidities  Goal: Patient's chronic conditions and co-morbidity symptoms are monitored and maintained or improved  11/4/2023 1721 by Ying Demarco RN  Outcome: Completed  11/4/2023 1015 by Ying Demarco RN  Outcome: Progressing  Flowsheets (Taken 11/4/2023 0800)  Care Plan - Patient's Chronic Conditions and Co-Morbidity Symptoms are Monitored and Maintained or Improved: Monitor and assess patient's chronic conditions and comorbid symptoms for stability, deterioration, or improvement     Problem: Safety - Adult  Goal: Free from fall injury  11/4/2023 1721 by Ying Demarco RN  Outcome: Completed  11/4/2023 1015 by Ying Demarco RN  Outcome: Progressing     Problem: Discharge Planning  Goal: Discharge to home or other facility with appropriate resources  11/4/2023 1721 by Ying Demarco RN  Outcome: Completed  11/4/2023 1015 by Ying Demarco RN  Outcome: Progressing  Flowsheets (Taken 11/4/2023 0800)  Discharge to home or other facility with appropriate resources: Identify barriers to discharge with patient and caregiver     Problem: Pain  Goal: Verbalizes/displays adequate comfort level or baseline comfort level  11/4/2023 1721 by Ying Demarco RN  Outcome: Completed  11/4/2023 1015 by Ying Demarco RN  Outcome: Progressing     Problem: ABCDS Injury Assessment  Goal: Absence of physical injury  11/4/2023 1721 by Ying Demarco RN  Outcome: Completed  11/4/2023 1015 by Ying Demarco RN  Outcome: Progressing  Flowsheets (Taken 11/4/2023 0443 by Tara Pickett RN)  Absence of Physical Injury: Implement safety measures based on patient assessment

## 2023-11-04 NOTE — DISCHARGE INSTR - COC
polyneuropathy associated with type 2 diabetes mellitus ([facility]) E11.42    History of coronary artery bypass graft Z95.1    Iron deficiency anemia D50.9    Spinal stenosis of lumbar region with neurogenic claudication M48.062    Mixed hyperlipidemia E78.2    CKD (chronic kidney disease) stage 4, GFR 15-29 ml/min ([facility]) N18.4    Type 2 diabetes 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, with long-term current use of insulin ([facility]) E11.22, N18.6, Z99.2, Z79.4    Obesity, Class II, BMI 35-39.9 E66.9    Thyroid nodule greater than or equal to 1 cm in diameter incidentally noted on imaging study E04.1    Primary hypertension I10    Anemia of chronic disease D63.8    Chronic ischemic heart disease I25.9    Ischemic stroke of frontal lobe ([facility]) I63.9    Morbid obesity with BMI of 40.0-44.9, adult ([facility]) E66.01, Z68.41    Disequilibrium syndrome E87.8    Anxiety F41.9    Chronic midline low back pain with bilateral sciatica M54.41, M54.42, G89.29    Acute thoracic back pain M54.6    COVID U07.1    Delirium due to another medical condition F05    Cerebral hypoperfusion I67.9    Immature arteriovenous fistula ([facility]) I77.0    History of fusion of cervical spine Z98.1    Depression with anxiety F41.8    Vancomycin resistant Enterococcus UTI A49.1, Z16.21    ESRD on hemodialysis ([facility]) N18.6, Z99.2    Dialysis disequilibrium syndrome E87.8    Acute cystitis without hematuria N30.00    VRE infection (vancomycin resistant Enterococcus) A49.1, Z16.21    Infection due to ESBL-producing Klebsiella pneumoniae A49.8, Z16.12    Class 2 severe obesity due to excess calories with serious comorbidity and body mass index (BMI) of 35.0 to 35.9 in adult ([facility]) E66.01, Z68.35    Type 2 diabetes mellitus with hyperglycemia, with long-term current use of insulin ([facility]) E11.65, Z79.4    Right hemiparesis ([facility]) G81.91    Ulcer of left foot, limited to breakdown of skin ([address]) L97.521    Secondary hyperparathyroidism ([address]) N25.81 with patient):  None    RN SIGNATURE:  Electronically signed by Ashly Goodrich RN on 11/4/23 at 5:26 PM EDT    CASE MANAGEMENT/SOCIAL WORK SECTION    Inpatient Status Date: ***    Readmission Risk Assessment Score:  Readmission Risk              Risk of Unplanned Readmission:  50           [facility]   [facility]  Address:  [address] [place] [address]  Fax:    Dialysis Facility (if applicable)   Name:  Address:  Dialysis Schedule:  Phone:  Fax:    / signature: Electronically signed by Joey Ruelas RN on 11/4/23 at 5:27 PM EDT    PHYSICIAN SECTION    Prognosis: Fair    Condition at Discharge: Stable    Rehab Potential (if transferring to Rehab): Fair    Recommended Labs or Other Treatments After Discharge: follow up with cardiologist     Physician Certification: I certify the above information and transfer of Dara Quinn  is necessary for the continuing treatment of the diagnosis listed and that she requires Home Care for greater 30 days.      Update Admission H&P: No change in H&P    PHYSICIAN SIGNATURE:  Electronically signed by Stephenie Cuba MD on 11/4/23 at 5:27 PM EDT

## 2023-11-04 NOTE — PLAN OF CARE
Problem: Chronic Conditions and Co-morbidities  Goal: Patient's chronic conditions and co-morbidity symptoms are monitored and maintained or improved  Outcome: Progressing  Flowsheets (Taken 11/4/2023 0800)  Care Plan - Patient's Chronic Conditions and Co-Morbidity Symptoms are Monitored and Maintained or Improved: Monitor and assess patient's chronic conditions and comorbid symptoms for stability, deterioration, or improvement     Problem: Safety - Adult  Goal: Free from fall injury  Outcome: Progressing     Problem: Discharge Planning  Goal: Discharge to home or other facility with appropriate resources  Outcome: Progressing  Flowsheets (Taken 11/4/2023 0800)  Discharge to home or other facility with appropriate resources: Identify barriers to discharge with patient and caregiver     Problem: Pain  Goal: Verbalizes/displays adequate comfort level or baseline comfort level  Outcome: Progressing     Problem: ABCDS Injury Assessment  Goal: Absence of physical injury  Outcome: Progressing  Flowsheets (Taken 11/4/2023 0443 by Manuel Plaza RN)  Absence of Physical Injury: Implement safety measures based on patient assessment

## 2023-11-05 LAB
EST. AVERAGE GLUCOSE BLD GHB EST-MCNC: 140 MG/DL
HBA1C MFR BLD: 6.5 % (ref 4–6)

## 2023-11-06 ENCOUNTER — CARE COORDINATION (OUTPATIENT)
Dept: CASE MANAGEMENT | Age: 63
End: 2023-11-06

## 2023-11-06 DIAGNOSIS — R07.9 CHEST PAIN WITH HIGH RISK FOR CARDIAC ETIOLOGY: Primary | ICD-10-CM

## 2023-11-06 PROCEDURE — 1111F DSCHRG MED/CURRENT MED MERGE: CPT | Performed by: NURSE PRACTITIONER

## 2023-11-06 NOTE — CARE COORDINATION
that week. Per notes, office to call and schedule her appt on a Tuesday or Saturday. She denies any current needs at this time, takes TLC for transportation to dialysis. Care Transition Nurse reviewed discharge instructions with patient who verbalized understanding. The patient was given an opportunity to ask questions and does not have any further questions or concerns at this time. Were discharge instructions available to patient? Yes. Reviewed appropriate site of care based on symptoms and resources available to patient including: PCP  Specialist. The patient agrees to contact the PCP office for questions related to their healthcare. Advance Care Planning:   Does patient have an Advance Directive: not on file; education provided. Medication reconciliation was performed with patient, who verbalizes understanding of administration of home medications. Medications reviewed, 1111F entered: yes    Was patient discharged with a pulse oximeter? no    Non-face-to-face services provided:  Obtained and reviewed discharge summary and/or continuity of care documents    Offered patient enrollment in the Remote Patient Monitoring (RPM) program for in-home monitoring: Yes, but did not enroll at this time.     Care Transitions 24 Hour Call    Schedule Follow Up Appointment with PCP: Completed  Do you have a copy of your discharge instructions?: Yes  Do you have all of your prescriptions and are they filled?: Yes  Have you been contacted by a [facility] Pharmacist?: No  Have you scheduled your follow up appointment?: No  Post Acute Services: Home Health, Outpatient/Community Services (Comment: Ana PT, SN)  Patient Home Equipment: Other (Comment: pulse ox)  Do you have support at home?: Parent(s)  Do you feel like you have everything you need to keep you well at home?: Yes  Care Transitions Interventions     Other Therapy Services: Completed     Physical Therapy: Completed Other Services: Completed     Registered

## 2023-11-07 NOTE — DISCHARGE SUMMARY
[address]     Department of Internal Medicine - Staff Internal Medicine Teaching Service    INPATIENT DISCHARGE SUMMARY      Patient Identification:  Luke Zaman is a 70 y.o. female. :  1960  MRN: [id number]     Acct: [de-identified]   PCP: AFSANEH Riley CNP  Admit Date:  11/3/2023  Discharge date and time: 2023  6:58 PM   Attending Provider: No att. providers found                                     [address] [facility] Problem Lists:  Principal Problem:    Acute electrocardiogram changes  Active Problems:    S/P angioplasty with stent    Unstable angina (HCC)    NSTEMI (non-ST elevated myocardial infarction) ([address])  Resolved Problems:    * No resolved hospital problems. *      HOSPITAL STAY     Brief Inpatient course:   Luke Zaman is a 70 y.o. female who was admitted originally at [facility] with complaints of left sided Chest pain with acute concerning EKG changes at the end of scheduled dialysis, Cardiology evaluated the patient and recommended Cardiac cath for which she was transferred directly to [facility] cardiac ICU with internal medicine service as primary given her cardiac history. Pt underwent cardiac catheterization and FADIA placed in the mid RCA. Post procedure pt was stable. Also Nephrology evaluated the patient and recommended no additional dialysis this time. Cardiology switched her brilinta to prasugrel and was OK to discharge. Once the patient was medically stable enough she was discharged to home with intructions to follow up with cardiology and Nephrology on OP basis.        Procedures/ Significant Interventions:    Left heart catheterization with FADIA to mid RCA    Consults:     Consults:     Final Specialist Recommendations/Findings:   IP CONSULT TO CASE MANAGEMENT  IP CONSULT TO HOME CARE NEEDS      Any Hospital Acquired Infections: none    Discharge Functional Status:  stable    DISCHARGE PLAN     Disposition:

## 2023-11-14 ENCOUNTER — CARE COORDINATION (OUTPATIENT)
Dept: CASE MANAGEMENT | Age: 63
End: 2023-11-14

## 2023-11-14 NOTE — CARE COORDINATION
and/or understanding of plan of care after discharge. Discussed appropriate site of care based on symptoms and resources available to patient including: PCP  Specialist. The patient agrees to contact the PCP office for questions related to their healthcare. Advance Care Planning:   not on file. Patients top risk factors for readmission: medical condition-NSTEMI  Interventions to address risk factors: Obtained and reviewed discharge summary and/or continuity of care documents    Offered patient enrollment in the Remote Patient Monitoring (RPM) program for in-home monitoring: Yes, but did not enroll at this time. Care Transitions Subsequent and Final Call    Schedule Follow Up Appointment with PCP: Completed  Subsequent and Final Calls  Do you have any ongoing symptoms?: Yes  Onset of Patient-reported symptoms: In the past 7 days  Patient-reported symptoms: Chest Pain  Have your medications changed?: No  Do you have any questions related to your medications?: No  Do you currently have any active services?: Yes  Are you currently active with any services?: Home Health, Outpatient/Community Services  Do you have any needs or concerns that I can assist you with?: No  Identified Barriers: Lack of Education  Care Transitions Interventions     Other Therapy Services: Completed     Physical Therapy: Completed Other Services: Completed     Registered Dietician: Completed DME Assistance: Completed                Occupational Therapy: Completed     Disease Association: Completed  Specialty Service Referral: Completed    Other Interventions:             Care Transition Nurse provided contact information for future needs. Plan for follow-up call in 1-2 days based on severity of symptoms and risk factors. Plan for next call:  check how cardiac cath went, see if she has any improvement of chest pain, dyspnea.      Mini Palafox RN

## 2023-11-15 ENCOUNTER — HOSPITAL ENCOUNTER (INPATIENT)
Age: 63
LOS: 1 days | Discharge: HOME | DRG: 321 | End: 2023-11-16
Attending: INTERNAL MEDICINE | Admitting: INTERNAL MEDICINE
Payer: COMMERCIAL

## 2023-11-15 DIAGNOSIS — I25.119 CORONARY ARTERY DISEASE WITH ANGINA PECTORIS, UNSPECIFIED VESSEL OR LESION TYPE, UNSPECIFIED WHETHER NATIVE OR TRANSPLANTED HEART (HCC): ICD-10-CM

## 2023-11-15 DIAGNOSIS — Z95.5 S/P PRIMARY ANGIOPLASTY WITH CORONARY STENT: Primary | ICD-10-CM

## 2023-11-15 LAB
BUN BLD-MCNC: 28 MG/DL (ref 8–26)
EGFR, POC: 18 ML/MIN/1.73M2
GLUCOSE BLD-MCNC: 140 MG/DL (ref 65–105)
GLUCOSE BLD-MCNC: 260 MG/DL (ref 65–105)
GLUCOSE BLD-MCNC: 263 MG/DL (ref 74–100)
HCT VFR BLD AUTO: 32 % (ref 36–46)
POC CREATININE: 2.8 MG/DL (ref 0.51–1.19)
POC HEMOGLOBIN (CALC): 10.9 G/DL (ref 12–16)
POTASSIUM BLD-SCNC: 3.8 MMOL/L (ref 3.5–4.5)
SODIUM BLD-SCNC: 140 MMOL/L (ref 138–146)

## 2023-11-15 PROCEDURE — 6360000004 HC RX CONTRAST MEDICATION: Performed by: INTERNAL MEDICINE

## 2023-11-15 PROCEDURE — 6370000000 HC RX 637 (ALT 250 FOR IP): Performed by: STUDENT IN AN ORGANIZED HEALTH CARE EDUCATION/TRAINING PROGRAM

## 2023-11-15 PROCEDURE — C1725 CATH, TRANSLUMIN NON-LASER: HCPCS | Performed by: INTERNAL MEDICINE

## 2023-11-15 PROCEDURE — C1769 GUIDE WIRE: HCPCS | Performed by: INTERNAL MEDICINE

## 2023-11-15 PROCEDURE — 92921 HC PRQ CARDIAC ANGIO ADDL ART: CPT | Performed by: INTERNAL MEDICINE

## 2023-11-15 PROCEDURE — 93454 CORONARY ARTERY ANGIO S&I: CPT | Performed by: INTERNAL MEDICINE

## 2023-11-15 PROCEDURE — 027237Z DILATION OF CORONARY ARTERY, THREE ARTERIES WITH FOUR OR MORE DRUG-ELUTING INTRALUMINAL DEVICES, PERCUTANEOUS APPROACH: ICD-10-PCS | Performed by: INTERNAL MEDICINE

## 2023-11-15 PROCEDURE — 82565 ASSAY OF CREATININE: CPT

## 2023-11-15 PROCEDURE — 84520 ASSAY OF UREA NITROGEN: CPT

## 2023-11-15 PROCEDURE — 7100000010 HC PHASE II RECOVERY - FIRST 15 MIN: Performed by: INTERNAL MEDICINE

## 2023-11-15 PROCEDURE — 6360000002 HC RX W HCPCS: Performed by: STUDENT IN AN ORGANIZED HEALTH CARE EDUCATION/TRAINING PROGRAM

## 2023-11-15 PROCEDURE — C9600 PERC DRUG-EL COR STENT SING: HCPCS | Performed by: INTERNAL MEDICINE

## 2023-11-15 PROCEDURE — 82947 ASSAY GLUCOSE BLOOD QUANT: CPT

## 2023-11-15 PROCEDURE — 2709999900 HC NON-CHARGEABLE SUPPLY: Performed by: INTERNAL MEDICINE

## 2023-11-15 PROCEDURE — 2500000003 HC RX 250 WO HCPCS: Performed by: INTERNAL MEDICINE

## 2023-11-15 PROCEDURE — 92929 PR PRQ TRLUML CORONARY STENT W/ANGIO ADDL ART/BRNCH: CPT | Performed by: INTERNAL MEDICINE

## 2023-11-15 PROCEDURE — 85014 HEMATOCRIT: CPT

## 2023-11-15 PROCEDURE — 2580000003 HC RX 258: Performed by: INTERNAL MEDICINE

## 2023-11-15 PROCEDURE — 6370000000 HC RX 637 (ALT 250 FOR IP): Performed by: INTERNAL MEDICINE

## 2023-11-15 PROCEDURE — 99152 MOD SED SAME PHYS/QHP 5/>YRS: CPT | Performed by: INTERNAL MEDICINE

## 2023-11-15 PROCEDURE — C9601 PERC DRUG-EL COR STENT BRAN: HCPCS | Performed by: INTERNAL MEDICINE

## 2023-11-15 PROCEDURE — 92920 PRQ TRLUML C ANGIOP 1ART&/BR: CPT | Performed by: INTERNAL MEDICINE

## 2023-11-15 PROCEDURE — C1874 STENT, COATED/COV W/DEL SYS: HCPCS | Performed by: INTERNAL MEDICINE

## 2023-11-15 PROCEDURE — 84132 ASSAY OF SERUM POTASSIUM: CPT

## 2023-11-15 PROCEDURE — 92928 PRQ TCAT PLMT NTRAC ST 1 LES: CPT | Performed by: INTERNAL MEDICINE

## 2023-11-15 PROCEDURE — 99153 MOD SED SAME PHYS/QHP EA: CPT | Performed by: INTERNAL MEDICINE

## 2023-11-15 PROCEDURE — 7100000011 HC PHASE II RECOVERY - ADDTL 15 MIN: Performed by: INTERNAL MEDICINE

## 2023-11-15 PROCEDURE — 2580000003 HC RX 258: Performed by: STUDENT IN AN ORGANIZED HEALTH CARE EDUCATION/TRAINING PROGRAM

## 2023-11-15 PROCEDURE — 84295 ASSAY OF SERUM SODIUM: CPT

## 2023-11-15 PROCEDURE — 6360000002 HC RX W HCPCS: Performed by: INTERNAL MEDICINE

## 2023-11-15 PROCEDURE — 2060000000 HC ICU INTERMEDIATE R&B

## 2023-11-15 PROCEDURE — C1894 INTRO/SHEATH, NON-LASER: HCPCS | Performed by: INTERNAL MEDICINE

## 2023-11-15 DEVICE — STENT ONYXNG20008UX ONYX 2.00X08RX
Type: IMPLANTABLE DEVICE | Status: FUNCTIONAL
Brand: ONYX FRONTIER™

## 2023-11-15 DEVICE — STENT ONYXNG20015UX ONYX 2.00X15RX
Type: IMPLANTABLE DEVICE | Status: FUNCTIONAL
Brand: ONYX FRONTIER™

## 2023-11-15 DEVICE — STENT ONYXNG20030UX ONYX 2.00X30RX
Type: IMPLANTABLE DEVICE | Status: FUNCTIONAL
Brand: ONYX FRONTIER™

## 2023-11-15 DEVICE — STENT ONYXNG30038UX ONYX 3.00X38RX
Type: IMPLANTABLE DEVICE | Status: FUNCTIONAL
Brand: ONYX FRONTIER™

## 2023-11-15 RX ORDER — BUMETANIDE 1 MG/1
2 TABLET ORAL 2 TIMES DAILY
Status: DISCONTINUED | OUTPATIENT
Start: 2023-11-15 | End: 2023-11-15 | Stop reason: SDUPTHER

## 2023-11-15 RX ORDER — PRASUGREL 10 MG/1
10 TABLET, FILM COATED ORAL DAILY
Status: DISCONTINUED | OUTPATIENT
Start: 2023-11-16 | End: 2023-11-16 | Stop reason: HOSPADM

## 2023-11-15 RX ORDER — BUMETANIDE 1 MG/1
3 TABLET ORAL 2 TIMES DAILY
Status: DISCONTINUED | OUTPATIENT
Start: 2023-11-15 | End: 2023-11-16 | Stop reason: HOSPADM

## 2023-11-15 RX ORDER — CHOLECALCIFEROL (VITAMIN D3) 125 MCG
10 CAPSULE ORAL NIGHTLY PRN
Status: DISCONTINUED | OUTPATIENT
Start: 2023-11-15 | End: 2023-11-16 | Stop reason: HOSPADM

## 2023-11-15 RX ORDER — CARVEDILOL 3.12 MG/1
3.12 TABLET ORAL 2 TIMES DAILY
Status: DISCONTINUED | OUTPATIENT
Start: 2023-11-15 | End: 2023-11-16 | Stop reason: HOSPADM

## 2023-11-15 RX ORDER — DOCUSATE SODIUM 100 MG/1
100 CAPSULE, LIQUID FILLED ORAL 2 TIMES DAILY
Status: DISCONTINUED | OUTPATIENT
Start: 2023-11-15 | End: 2023-11-16 | Stop reason: HOSPADM

## 2023-11-15 RX ORDER — POTASSIUM CHLORIDE 1.5 G/1.58G
20 POWDER, FOR SOLUTION ORAL DAILY
COMMUNITY

## 2023-11-15 RX ORDER — ASPIRIN 325 MG
TABLET, DELAYED RELEASE (ENTERIC COATED) ORAL PRN
Status: DISCONTINUED | OUTPATIENT
Start: 2023-11-15 | End: 2023-11-15 | Stop reason: HOSPADM

## 2023-11-15 RX ORDER — ASPIRIN 81 MG/1
81 TABLET, CHEWABLE ORAL DAILY
Status: DISCONTINUED | OUTPATIENT
Start: 2023-11-16 | End: 2023-11-16 | Stop reason: HOSPADM

## 2023-11-15 RX ORDER — CYCLOBENZAPRINE HCL 10 MG
10 TABLET ORAL 3 TIMES DAILY PRN
Status: DISCONTINUED | OUTPATIENT
Start: 2023-11-15 | End: 2023-11-16 | Stop reason: HOSPADM

## 2023-11-15 RX ORDER — FENTANYL CITRATE 50 UG/ML
50 INJECTION, SOLUTION INTRAMUSCULAR; INTRAVENOUS
Status: COMPLETED | OUTPATIENT
Start: 2023-11-15 | End: 2023-11-16

## 2023-11-15 RX ORDER — NITROGLYCERIN 20 MG/100ML
INJECTION INTRAVENOUS PRN
Status: DISCONTINUED | OUTPATIENT
Start: 2023-11-15 | End: 2023-11-15 | Stop reason: HOSPADM

## 2023-11-15 RX ORDER — BUSPIRONE HYDROCHLORIDE 10 MG/1
10 TABLET ORAL 3 TIMES DAILY
Status: DISCONTINUED | OUTPATIENT
Start: 2023-11-15 | End: 2023-11-16 | Stop reason: HOSPADM

## 2023-11-15 RX ORDER — SODIUM CHLORIDE 0.9 % (FLUSH) 0.9 %
5-40 SYRINGE (ML) INJECTION EVERY 12 HOURS SCHEDULED
Status: DISCONTINUED | OUTPATIENT
Start: 2023-11-15 | End: 2023-11-16 | Stop reason: HOSPADM

## 2023-11-15 RX ORDER — INSULIN GLARGINE 100 [IU]/ML
72 INJECTION, SOLUTION SUBCUTANEOUS 2 TIMES DAILY
Status: DISCONTINUED | OUTPATIENT
Start: 2023-11-15 | End: 2023-11-15

## 2023-11-15 RX ORDER — MIDAZOLAM HYDROCHLORIDE 1 MG/ML
INJECTION INTRAMUSCULAR; INTRAVENOUS PRN
Status: DISCONTINUED | OUTPATIENT
Start: 2023-11-15 | End: 2023-11-15 | Stop reason: HOSPADM

## 2023-11-15 RX ORDER — DEXTROSE MONOHYDRATE 100 MG/ML
INJECTION, SOLUTION INTRAVENOUS CONTINUOUS PRN
Status: DISCONTINUED | OUTPATIENT
Start: 2023-11-15 | End: 2023-11-16 | Stop reason: HOSPADM

## 2023-11-15 RX ORDER — NITROGLYCERIN 0.4 MG/1
0.4 TABLET SUBLINGUAL EVERY 5 MIN PRN
Status: DISCONTINUED | OUTPATIENT
Start: 2023-11-15 | End: 2023-11-16 | Stop reason: HOSPADM

## 2023-11-15 RX ORDER — BIVALIRUDIN 250 MG/5ML
INJECTION, POWDER, LYOPHILIZED, FOR SOLUTION INTRAVENOUS PRN
Status: DISCONTINUED | OUTPATIENT
Start: 2023-11-15 | End: 2023-11-15 | Stop reason: HOSPADM

## 2023-11-15 RX ORDER — VENLAFAXINE HYDROCHLORIDE 75 MG/1
75 CAPSULE, EXTENDED RELEASE ORAL
Status: DISCONTINUED | OUTPATIENT
Start: 2023-11-16 | End: 2023-11-16 | Stop reason: HOSPADM

## 2023-11-15 RX ORDER — GABAPENTIN 100 MG/1
100 CAPSULE ORAL 2 TIMES DAILY
Status: DISCONTINUED | OUTPATIENT
Start: 2023-11-15 | End: 2023-11-16 | Stop reason: HOSPADM

## 2023-11-15 RX ORDER — LABETALOL HYDROCHLORIDE 5 MG/ML
10 INJECTION, SOLUTION INTRAVENOUS EVERY 6 HOURS PRN
Status: DISCONTINUED | OUTPATIENT
Start: 2023-11-15 | End: 2023-11-16 | Stop reason: HOSPADM

## 2023-11-15 RX ORDER — INSULIN GLARGINE 100 [IU]/ML
72 INJECTION, SOLUTION SUBCUTANEOUS DAILY
Status: DISCONTINUED | OUTPATIENT
Start: 2023-11-16 | End: 2023-11-16 | Stop reason: HOSPADM

## 2023-11-15 RX ORDER — ALLOPURINOL 100 MG/1
100 TABLET ORAL DAILY
Status: DISCONTINUED | OUTPATIENT
Start: 2023-11-15 | End: 2023-11-16 | Stop reason: HOSPADM

## 2023-11-15 RX ORDER — FENTANYL CITRATE 50 UG/ML
INJECTION, SOLUTION INTRAMUSCULAR; INTRAVENOUS PRN
Status: DISCONTINUED | OUTPATIENT
Start: 2023-11-15 | End: 2023-11-15 | Stop reason: HOSPADM

## 2023-11-15 RX ORDER — PRASUGREL 10 MG/1
TABLET, FILM COATED ORAL PRN
Status: DISCONTINUED | OUTPATIENT
Start: 2023-11-15 | End: 2023-11-15 | Stop reason: HOSPADM

## 2023-11-15 RX ORDER — SODIUM CHLORIDE 0.9 % (FLUSH) 0.9 %
5-40 SYRINGE (ML) INJECTION PRN
Status: DISCONTINUED | OUTPATIENT
Start: 2023-11-15 | End: 2023-11-16 | Stop reason: HOSPADM

## 2023-11-15 RX ORDER — ACETAMINOPHEN 325 MG/1
TABLET ORAL PRN
Status: DISCONTINUED | OUTPATIENT
Start: 2023-11-15 | End: 2023-11-15 | Stop reason: HOSPADM

## 2023-11-15 RX ORDER — INSULIN GLARGINE 100 [IU]/ML
42 INJECTION, SOLUTION SUBCUTANEOUS NIGHTLY
Status: DISCONTINUED | OUTPATIENT
Start: 2023-11-15 | End: 2023-11-16 | Stop reason: HOSPADM

## 2023-11-15 RX ORDER — MIDODRINE HYDROCHLORIDE 5 MG/1
5 TABLET ORAL PRN
Status: DISCONTINUED | OUTPATIENT
Start: 2023-11-15 | End: 2023-11-16 | Stop reason: HOSPADM

## 2023-11-15 RX ORDER — INSULIN LISPRO 100 [IU]/ML
17 INJECTION, SOLUTION INTRAVENOUS; SUBCUTANEOUS
Status: DISCONTINUED | OUTPATIENT
Start: 2023-11-15 | End: 2023-11-16 | Stop reason: HOSPADM

## 2023-11-15 RX ORDER — SODIUM CHLORIDE 9 MG/ML
INJECTION, SOLUTION INTRAVENOUS CONTINUOUS
Status: DISCONTINUED | OUTPATIENT
Start: 2023-11-15 | End: 2023-11-16 | Stop reason: HOSPADM

## 2023-11-15 RX ORDER — SODIUM CHLORIDE 9 MG/ML
INJECTION, SOLUTION INTRAVENOUS PRN
Status: DISCONTINUED | OUTPATIENT
Start: 2023-11-15 | End: 2023-11-16 | Stop reason: HOSPADM

## 2023-11-15 RX ORDER — SODIUM BICARBONATE 650 MG/1
1300 TABLET ORAL 3 TIMES DAILY
Status: DISCONTINUED | OUTPATIENT
Start: 2023-11-15 | End: 2023-11-16 | Stop reason: HOSPADM

## 2023-11-15 RX ORDER — ATORVASTATIN CALCIUM 80 MG/1
80 TABLET, FILM COATED ORAL NIGHTLY
Status: DISCONTINUED | OUTPATIENT
Start: 2023-11-15 | End: 2023-11-16 | Stop reason: HOSPADM

## 2023-11-15 RX ADMIN — INSULIN LISPRO 17 UNITS: 100 INJECTION, SOLUTION INTRAVENOUS; SUBCUTANEOUS at 19:01

## 2023-11-15 RX ADMIN — FENTANYL CITRATE 50 MCG: 50 INJECTION, SOLUTION INTRAMUSCULAR; INTRAVENOUS at 22:44

## 2023-11-15 RX ADMIN — SODIUM CHLORIDE, PRESERVATIVE FREE 10 ML: 5 INJECTION INTRAVENOUS at 20:38

## 2023-11-15 RX ADMIN — APIXABAN 5 MG: 2.5 TABLET, FILM COATED ORAL at 20:38

## 2023-11-15 RX ADMIN — BUSPIRONE HYDROCHLORIDE 10 MG: 10 TABLET ORAL at 20:38

## 2023-11-15 RX ADMIN — SODIUM BICARBONATE 1300 MG: 648 TABLET ORAL at 20:40

## 2023-11-15 RX ADMIN — GABAPENTIN 100 MG: 100 CAPSULE ORAL at 20:31

## 2023-11-15 RX ADMIN — CARVEDILOL 3.12 MG: 3.12 TABLET, FILM COATED ORAL at 20:31

## 2023-11-15 RX ADMIN — INSULIN GLARGINE 42 UNITS: 100 INJECTION, SOLUTION SUBCUTANEOUS at 20:46

## 2023-11-15 RX ADMIN — SODIUM CHLORIDE: 9 INJECTION, SOLUTION INTRAVENOUS at 13:27

## 2023-11-15 RX ADMIN — DOCUSATE SODIUM 100 MG: 100 CAPSULE, LIQUID FILLED ORAL at 20:31

## 2023-11-15 RX ADMIN — ATORVASTATIN CALCIUM 80 MG: 80 TABLET, FILM COATED ORAL at 20:31

## 2023-11-15 RX ADMIN — BUMETANIDE 3 MG: 1 TABLET ORAL at 20:38

## 2023-11-15 NOTE — PROGRESS NOTES
Received post PCI procedure to [facility] room 6. Assessment obtained. Restrictions reviewed with patient. Post procedure pathway initiated. Right groin site soft , dressing clean, dry and intact. No hematoma noted. Family at side. Patient without complaints. Head of bed flat with right leg straight.

## 2023-11-15 NOTE — H&P
[facility]  Procedure History and Physical Update          Patient Name: Dara Quinn  MRN:    [id number]  YOB: 1960  Date of evaluation:  11/15/2023    Procedure:    Cardiac cath +/- PCI    Indication for procedure:  Abnormal stress test      Please refer to the note completed by RYAN Leach on 11/4/23 in the medical record and note that:    [x] I have examined the patient and reviewed the H&P/Consult and there are no changes to be made to the assessment or plan.     [] I have examined the patient and reviewed the H&P/Consult and have noted the following changes:    Past Medical History:   Diagnosis Date    Arthritis     Backache, unspecified     CAD (coronary artery disease)     Cerebral artery occlusion with cerebral infarction (HCC)     CHF (congestive heart failure) (HCC)     Chronic kidney disease     Coronary atherosclerosis of artery bypass graft     COVID 01/31/2022    Cramp of limb     Gallstones     Hemodialysis patient ([address])     [address]  /  [facility] [place]    Hyperlipidemia     Hypertension     Insomnia     Neuromuscular disorder ([address] )     Pneumonia     Psychiatric problem     Thyroid disease     Type II or unspecified type diabetes mellitus with renal manifestations, not stated as uncontrolled(250.40)     Type II or unspecified type diabetes mellitus without mention of complication, not stated as uncontrolled     Unspecified vitamin D deficiency        Past Surgical History:   Procedure Laterality Date    ANKLE FRACTURE SURGERY      AV FISTULA CREATION  12/14/2021    BACK SURGERY      BREAST SURGERY      CARDIAC CATHETERIZATION  2005    MVD  /  CT CONSULT    CARDIAC PROCEDURE N/A 11/3/2023    taleb / Coronary angiography / op [facility] performed by Laney Hansen MD at [address] [facility] N/A     COLONOSCOPY      CORONARY ANGIOPLASTY WITH STENT PLACEMENT  02/14/2023    PTCA /

## 2023-11-15 NOTE — PROGRESS NOTES
Report called to Leigh Ann Alfaro, car 2 RN. All questions answered, no questions or concerns. Pt transported upstairs with all belongings.

## 2023-11-15 NOTE — PROGRESS NOTES
Irina is a 37 year old who is being evaluated via a billable video visit.      How would you like to obtain your AVS? MyChart  If the video visit is dropped, the invitation should be resent by: Send to e-mail at: [email or web address]  Will anyone else be joining your video visit? No        Video-Visit Details    Type of service:  Video Visit     Originating Location (pt. Location): Home    Distant Location (provider location):  On-site  Platform used for Video Visit: Tommy       Nephrology Progress Note      SUBJECTIVE      Patient was seen and examined. Overnight nephrology was contacted for increased urine output with Lasix infusion was instructed to try to treat the Lasix infusion to keep the urine output between 100-150 mill per hour  Patient is awake alert and oriented  Denies any chest pain, reports shortness of breath during physical activity when walking around with physical therapy. Reports that she is not at her baseline yet appears comfortable on the bed propped up. Currently on Lasix infusion at the rate of 4 mg/h, since morning making around 90 -100 cc/h    And afebrile, heart rate and blood pressure are acceptable  On 3 L nasal cannula which is her baseline  Morning labs showed -   sodium 142, potassium 4.2, BUN 65, creatinine 2.99, glucose 53  Urine output last 24 hours 3.5 L, 1.3 cc/kg/h    HPI -   Patient has history of diastolic heart failure, CKD, hypertension, hyperlipidemia, type 2 diabetes mellitus, CAD s/p CABG. Home medications include aspirin, statin, insulin, Ranexa, Lupron, Lasix 120 mg twice daily, Coreg, Protonix  Uses oxygen at home  Admitted with symptoms of worsening shortness of breath, worsening pedal edema. On arrival he was noted to be hypertensive. Initially on nitro infusion for hypertension and pulmonary edema. Nephrology following for stage IV CKD, creatinine ranging between 2.2-2.4  Renal urine study showing worsening proteinuria    During this hospital admission patient was started on Imdur by cardiology. On 2023 concern for allergic reaction due to patient reporting difficulty breathing.   Received breathing treatment, Decadron and Benadryl for which she improved    Started on IV Lasix infusion on 2023      OBJECTIVE      CURRENT TEMPERATURE:  Temp: 97.9 °F (36.6 °C)  MAXIMUM TEMPERATURE OVER 24HRS:  Temp (24hrs), Av.1 °F (36.7 °C), Min:97.2 °F (36.2 °C), Max:98.9 °F (37.2 °C)    CURRENT RESPIRATORY RATE:  Resp: 19  CURRENT PULSE:  Heart Rate: 58  CURRENT BLOOD PRESSURE:  BP: 127/68  24HR BLOOD PRESSURE RANGE:  Systolic (95USQ), KARINE:242 , Min:114 , XWS:674   ; Diastolic (60FFN), PVV:92, Min:55, Max:94    24HR INTAKE/OUTPUT:    Intake/Output Summary (Last 24 hours) at 2/1/2023 0840  Last data filed at 2/1/2023 4380  Gross per 24 hour   Intake --   Output 3590 ml   Net -3590 ml       PHYSICAL EXAM      GENERAL APPEARANCE:Awake, alert, in no acute distress  SKIN: warm and dry, no rash or erythema  EYES: conjunctivae normal and sclera anicteric  ENT: no thrush no pharyngeal congestion   NECK:  JVD: None   PULMONARY: Bilateral inspiratory crackles  CADRDIOVASCULAR: Normal heart sounds  ABDOMEN: Soft and distended  EXTREMITIES: Bilateral pitting pedal edema    CURRENT MEDICATIONS      insulin glargine (LANTUS) injection vial 40 Units, BID  carvedilol (COREG) tablet 12.5 mg, BID  tamsulosin (FLOMAX) capsule 0.4 mg, Daily  furosemide (LASIX) 100 mg in sodium chloride 0.9 % 100 mL infusion, Continuous  insulin lispro (HUMALOG) injection vial 0-16 Units, TID WC  insulin lispro (HUMALOG) injection vial 0-4 Units, Nightly  ipratropium-albuterol (DUONEB) nebulizer solution 1 ampule, Q4H WA  benzocaine-menthol (CEPACOL SORE THROAT) lozenge 1 lozenge, Q2H PRN  gabapentin (NEURONTIN) capsule 300 mg, Nightly  melatonin tablet 3 mg, Nightly PRN  melatonin tablet 10 mg, Nightly PRN  glucose chewable tablet 16 g, PRN  dextrose bolus 10% 125 mL, PRN   Or  dextrose bolus 10% 250 mL, PRN  glucagon (rDNA) injection 1 mg, PRN  dextrose 10 % infusion, Continuous PRN  docusate sodium (COLACE) capsule 100 mg, BID  [Held by provider] ranolazine (RANEXA) extended release tablet 1,000 mg, Daily  [Held by provider] isosorbide dinitrate (ISORDIL) tablet 10 mg, TID  aspirin chewable tablet 81 mg, Daily  atorvastatin (LIPITOR) tablet 40 mg, Nightly  sodium bicarbonate tablet 1,300 mg, BID  pantoprazole (PROTONIX) tablet 40 mg, QAM AC  sodium chloride flush 0.9 % injection 5-40 mL, 2 times per day  sodium chloride flush 0.9 % injection 10 mL, PRN  0.9 % sodium chloride infusion, PRN  potassium chloride (KLOR-CON M) extended release tablet 40 mEq, PRN   Or  potassium bicarb-citric acid (EFFER-K) effervescent tablet 40 mEq, PRN   Or  potassium chloride 10 mEq/100 mL IVPB (Peripheral Line), PRN  magnesium sulfate 1000 mg in dextrose 5% 100 mL IVPB, PRN  ondansetron (ZOFRAN-ODT) disintegrating tablet 4 mg, Q8H PRN   Or  ondansetron (ZOFRAN) injection 4 mg, Q6H PRN  polyethylene glycol (GLYCOLAX) packet 17 g, Daily PRN  acetaminophen (TYLENOL) tablet 650 mg, Q6H PRN   Or  acetaminophen (TYLENOL) suppository 650 mg, Q6H PRN  heparin (porcine) injection 5,000 Units, 3 times per day  labetalol (NORMODYNE;TRANDATE) injection 10 mg, Q6H PRN  amLODIPine (NORVASC) tablet 10 mg, Daily  hydrALAZINE (APRESOLINE) injection 10 mg, Q6H PRN          LABS       BMP:   Recent Labs     01/30/23  0207 01/31/23  0421 02/01/23  0538    141 142   K 4.3 3.9 4.2    104 103   CO2 25 25 25   BUN 56* 61* 65*   CREATININE 2.91* 3.02* 2.99*   GLUCOSE 156* 55* 73   CALCIUM 8.3* 8.5* 8.8      BNP:  Lab Results   Component Value Date/Time    BNP 41 04/09/2015 12:00 AM     MAGNESIUM:   Recent Labs     01/30/23  0207 01/31/23  0421   MG 2.1 2.1     IRON:    Lab Results   Component Value Date/Time    IRON 83 09/27/2022 01:32 PM     IRON SATURATION:    Lab Results   Component Value Date/Time    LABIRON 38 09/27/2022 01:32 PM     TIBC:    Lab Results   Component Value Date/Time    TIBC 217 09/27/2022 01:32 PM     FERRITIN:    Lab Results   Component Value Date/Time    FERRITIN 117 09/27/2022 01:32 PM     RUSSELL:   Lab Results   Component Value Date    RUSSELL NEGATIVE 08/03/2022       SPEP:   Lab Results   Component Value Date/Time    PROT 7.0 12/01/2022 10:25 AM     UPEP:   Lab Results   Component Value Date/Time    TPU 20 11/12/2021 10:30 AM      HEPBSAG:  Lab Results   Component Value Date/Time    HEPBSAG NONREACTIVE 03/30/2022 03:30 PM     HEPCAB:  Lab Results   Component Value Date/Time    HEPCAB NONREACTIVE 03/30/2022 03:30 PM     URINE SODIUM:    Lab Results   Component Value Date/Time    JASON 47 11/14/2021 02:04 AM      URINE CREATININE:    Lab Results   Component Value Date/Time    LABCREA 65.4 01/26/2023 01:10 PM     URINE PROTEIN:    Lab Results   Component Value Date/Time    TPU 20 11/12/2021 10:30 AM     URINALYSIS:  U/A:   Lab Results   Component Value Date/Time    NITRU NEGATIVE 01/10/2023 02:14 PM    COLORU Yellow 01/10/2023 02:14 PM    PHUR 7.0 01/10/2023 02:14 PM    WBCUA 2 TO 5 01/10/2023 02:14 PM    RBCUA 0 TO 2 01/10/2023 02:14 PM    MUCUS 2+ 07/31/2022 12:00 PM    TRICHOMONAS NOT REPORTED 11/14/2021 02:05 AM    YEAST FEW 07/02/2022 09:10 AM    BACTERIA FEW 01/10/2023 02:14 PM    SPECGRAV 1.021 01/10/2023 02:14 PM    LEUKOCYTESUR SMALL 01/10/2023 02:14 PM    UROBILINOGEN Normal 01/10/2023 02:14 PM    BILIRUBINUR NEGATIVE 01/10/2023 02:14 PM    GLUCOSEU NEGATIVE 01/10/2023 02:14 PM    KETUA NEGATIVE 01/10/2023 02:14 PM    AMORPHOUS 2+ 07/31/2022 12:00 PM     RADIOLOGY      Reviewed as available. ASSESSMENT      1. CKD 4 likely due to underlying diabetic renal involvement. Was on dialysis for several months, got off of dialysis about 5-1/2 months ago and later taken off of HD. Her creatinine currently seems to be running around high 2's to low 3's.    2.  Acute on chronic diastolic CHF  3. Hypertensive urgency  3. Acute on chronic hypoxic respiratory failure  4. Diabetes mellitus type 2  5. CAD s/p CABG, last cath in October 2019  6. Essential hypertension  7. Obesity  8. History of DVT    PLAN      1. Change to Bumex 2 mg IV BID. 2.  Monitor strict I's and O's, daily weights  3. Continue to hold ARB for now. 4.  BMP in AM.  5.  Will follow.     Martin Farley  PGY-2  Internal Medicine  [facility], [facility]   53:[address] [place] 2/1/2023     Attending Physician Statement  I have discussed the care of this patient, including pertinent history and exam findings, with the Resident/CNP. I have reviewed and edited the key elements of all parts of the encounter with the Resident/CNP. I agree with the assessment, plan and orders as documented by the Resident/CNP. Charlie Taylor MD   Nephrology [address] [place] [address] Drive    This note is created with the assistance of a speech-recognition program. While intending to generate a document that actually reflects the content of the visit, no guarantees can be provided that every mistake has been identified and corrected by editing.

## 2023-11-16 VITALS
DIASTOLIC BLOOD PRESSURE: 52 MMHG | TEMPERATURE: 98.7 F | OXYGEN SATURATION: 99 % | BODY MASS INDEX: 38.64 KG/M2 | HEART RATE: 79 BPM | RESPIRATION RATE: 11 BRPM | HEIGHT: 65 IN | SYSTOLIC BLOOD PRESSURE: 121 MMHG | WEIGHT: 231.92 LBS

## 2023-11-16 LAB
ANION GAP SERPL CALCULATED.3IONS-SCNC: 18 MMOL/L (ref 9–17)
BUN SERPL-MCNC: 37 MG/DL (ref 8–23)
CALCIUM SERPL-MCNC: 8.7 MG/DL (ref 8.6–10.4)
CHLORIDE SERPL-SCNC: 93 MMOL/L (ref 98–107)
CO2 SERPL-SCNC: 25 MMOL/L (ref 20–31)
CREAT SERPL-MCNC: 3.6 MG/DL (ref 0.5–0.9)
ERYTHROCYTE [DISTWIDTH] IN BLOOD BY AUTOMATED COUNT: 17.4 % (ref 11.8–14.4)
GFR SERPL CREATININE-BSD FRML MDRD: 13 ML/MIN/1.73M2
GLUCOSE BLD-MCNC: 249 MG/DL (ref 65–105)
GLUCOSE BLD-MCNC: 323 MG/DL (ref 65–105)
GLUCOSE SERPL-MCNC: 264 MG/DL (ref 70–99)
HCT VFR BLD AUTO: 28.2 % (ref 36.3–47.1)
HGB BLD-MCNC: 9.5 G/DL (ref 11.9–15.1)
MCH RBC QN AUTO: 36.1 PG (ref 25.2–33.5)
MCHC RBC AUTO-ENTMCNC: 33.7 G/DL (ref 28.4–34.8)
MCV RBC AUTO: 107.2 FL (ref 82.6–102.9)
NRBC BLD-RTO: 0 PER 100 WBC
PLATELET # BLD AUTO: 153 K/UL (ref 138–453)
PMV BLD AUTO: 10.1 FL (ref 8.1–13.5)
POTASSIUM SERPL-SCNC: 4 MMOL/L (ref 3.7–5.3)
RBC # BLD AUTO: 2.63 M/UL (ref 3.95–5.11)
SODIUM SERPL-SCNC: 136 MMOL/L (ref 135–144)
WBC OTHER # BLD: 7.4 K/UL (ref 3.5–11.3)

## 2023-11-16 PROCEDURE — 85027 COMPLETE CBC AUTOMATED: CPT

## 2023-11-16 PROCEDURE — 6360000002 HC RX W HCPCS: Performed by: STUDENT IN AN ORGANIZED HEALTH CARE EDUCATION/TRAINING PROGRAM

## 2023-11-16 PROCEDURE — 6370000000 HC RX 637 (ALT 250 FOR IP): Performed by: SURGERY

## 2023-11-16 PROCEDURE — 2700000000 HC OXYGEN THERAPY PER DAY

## 2023-11-16 PROCEDURE — 36415 COLL VENOUS BLD VENIPUNCTURE: CPT

## 2023-11-16 PROCEDURE — 94761 N-INVAS EAR/PLS OXIMETRY MLT: CPT

## 2023-11-16 PROCEDURE — 80048 BASIC METABOLIC PNL TOTAL CA: CPT

## 2023-11-16 PROCEDURE — 2580000003 HC RX 258: Performed by: STUDENT IN AN ORGANIZED HEALTH CARE EDUCATION/TRAINING PROGRAM

## 2023-11-16 PROCEDURE — 6370000000 HC RX 637 (ALT 250 FOR IP): Performed by: STUDENT IN AN ORGANIZED HEALTH CARE EDUCATION/TRAINING PROGRAM

## 2023-11-16 PROCEDURE — 82947 ASSAY GLUCOSE BLOOD QUANT: CPT

## 2023-11-16 PROCEDURE — 99238 HOSP IP/OBS DSCHRG MGMT 30/<: CPT | Performed by: SURGERY

## 2023-11-16 RX ORDER — OXYCODONE HYDROCHLORIDE AND ACETAMINOPHEN 5; 325 MG/1; MG/1
1 TABLET ORAL ONCE
Status: COMPLETED | OUTPATIENT
Start: 2023-11-16 | End: 2023-11-16

## 2023-11-16 RX ADMIN — VENLAFAXINE HYDROCHLORIDE 75 MG: 75 CAPSULE, EXTENDED RELEASE ORAL at 09:13

## 2023-11-16 RX ADMIN — SODIUM CHLORIDE, PRESERVATIVE FREE 10 ML: 5 INJECTION INTRAVENOUS at 09:14

## 2023-11-16 RX ADMIN — INSULIN LISPRO 17 UNITS: 100 INJECTION, SOLUTION INTRAVENOUS; SUBCUTANEOUS at 09:12

## 2023-11-16 RX ADMIN — PRASUGREL 10 MG: 10 TABLET, FILM COATED ORAL at 09:13

## 2023-11-16 RX ADMIN — BUSPIRONE HYDROCHLORIDE 10 MG: 10 TABLET ORAL at 14:01

## 2023-11-16 RX ADMIN — OXYCODONE AND ACETAMINOPHEN 1 TABLET: 5; 325 TABLET ORAL at 11:43

## 2023-11-16 RX ADMIN — INSULIN LISPRO 17 UNITS: 100 INJECTION, SOLUTION INTRAVENOUS; SUBCUTANEOUS at 12:05

## 2023-11-16 RX ADMIN — BUSPIRONE HYDROCHLORIDE 10 MG: 10 TABLET ORAL at 09:13

## 2023-11-16 RX ADMIN — SODIUM BICARBONATE 1300 MG: 648 TABLET ORAL at 09:13

## 2023-11-16 RX ADMIN — INSULIN GLARGINE 72 UNITS: 100 INJECTION, SOLUTION SUBCUTANEOUS at 09:12

## 2023-11-16 RX ADMIN — BUMETANIDE 3 MG: 1 TABLET ORAL at 09:13

## 2023-11-16 RX ADMIN — SODIUM BICARBONATE 1300 MG: 648 TABLET ORAL at 14:01

## 2023-11-16 RX ADMIN — FENTANYL CITRATE 50 MCG: 50 INJECTION, SOLUTION INTRAMUSCULAR; INTRAVENOUS at 03:39

## 2023-11-16 RX ADMIN — CARVEDILOL 3.12 MG: 3.12 TABLET, FILM COATED ORAL at 09:13

## 2023-11-16 RX ADMIN — GABAPENTIN 100 MG: 100 CAPSULE ORAL at 09:13

## 2023-11-16 RX ADMIN — ASPIRIN 81 MG: 81 TABLET, CHEWABLE ORAL at 09:13

## 2023-11-16 RX ADMIN — DOCUSATE SODIUM 100 MG: 100 CAPSULE, LIQUID FILLED ORAL at 09:13

## 2023-11-16 RX ADMIN — ALLOPURINOL 100 MG: 100 TABLET ORAL at 09:14

## 2023-11-16 ASSESSMENT — PAIN DESCRIPTION - LOCATION
LOCATION: GROIN
LOCATION: GROIN

## 2023-11-16 ASSESSMENT — PAIN SCALES - GENERAL
PAINLEVEL_OUTOF10: 7
PAINLEVEL_OUTOF10: 7

## 2023-11-16 ASSESSMENT — PAIN - FUNCTIONAL ASSESSMENT: PAIN_FUNCTIONAL_ASSESSMENT: ACTIVITIES ARE NOT PREVENTED

## 2023-11-16 ASSESSMENT — PAIN DESCRIPTION - ORIENTATION
ORIENTATION: RIGHT
ORIENTATION: RIGHT

## 2023-11-16 ASSESSMENT — PAIN DESCRIPTION - DESCRIPTORS: DESCRIPTORS: ACHING

## 2023-11-16 ASSESSMENT — PAIN DESCRIPTION - PAIN TYPE: TYPE: ACUTE PAIN

## 2023-11-16 NOTE — DISCHARGE INSTR - COC
Continuity of Care Form    Patient Name: Angi Lacey   :  1960  MRN:  [id number]    [address] date:  11/15/2023  Discharge date:  23    Code Status Order: Full Code   Advance Directives:     Admitting Physician:  America Stover MD  PCP: AFSANEH Greenfield CNP    Discharging Nurse: [address] Unit/Room#:   Discharging Unit Phone Number: [phone]    Emergency Contact:   Extended Emergency Contact Information  Primary Emergency Contact: Hailey Guido  Home Phone: [phone]  Relation: Brother/Sister  Secondary Emergency Contact: [address] [facility] [address] Phone: [phone]  Relation: Brother/Sister    Past Surgical History:  Past Surgical History:   Procedure Laterality Date    ANKLE FRACTURE SURGERY      AV FISTULA CREATION  2021    BACK SURGERY      BREAST SURGERY      CARDIAC CATHETERIZATION      MVD  /  CT CONSULT    CARDIAC PROCEDURE N/A 2023    taleb / Coronary angiography / op [facility] performed by America Stover MD at [address] [facility] N/A     COLONOSCOPY      CORONARY ANGIOPLASTY WITH STENT PLACEMENT  2023    PTCA / FADIA RCA    CORONARY ANGIOPLASTY WITH STENT PLACEMENT  2019    STENT X1 AT [place]  2023    DR CEJA  /  FADIA SVG-DIAG  /  NEEDS RCA DONE    CORONARY ARTERY BYPASS GRAFT  02/2005    X3 [facility]    DIALYSIS CATHETER INSERTION Right 2023    CVC CATHETER REPLACEMENT right chest performed by Sandra Oh MD at [facility] Left 2021    LEFT AV FISTULA CREATION UPPER EXTREMITY performed by Carlene Bangura MD at [facility] Left 2023    LEFT AV FISTULA REVISION WITH SUPERFICIALIZATION performed by Sandra Oh MD at [address] [facility] bearing restrictions  Other Medical Equipment (for information only, NOT a DME order):  walker  Other Treatments: skilled nursing    Patient's personal belongings (please select all that are sent with patient):  {[facility] DME Belongings:[id number]}    RN SIGNATURE:  Electronically signed by Hernan Zazueta RN on 11/16/23 at 12:26 PM EST    CASE MANAGEMENT/SOCIAL WORK SECTION    Inpatient Status Date: ***    Readmission Risk Assessment Score:  Readmission Risk              Risk of Unplanned Readmission:  53           Discharging to Facility/ Agency   Name: [address]  Phone: [phone]    / signature: Electronically signed by Helen Manning RN on 11/16/23 at 11:45 AM EST    PHYSICIAN SECTION    Prognosis: Fair    Condition at Discharge: Stable    Rehab Potential (if transferring to Rehab): Fair    Recommended Labs or Other Treatments After Discharge: PT/OT    Physician Certification: I certify the above information and transfer of Atul Hastings  is necessary for the continuing treatment of the diagnosis listed and that she requires Home Care for less 30 days.      Update Admission H&P: No change in H&P    PHYSICIAN SIGNATURE:  Electronically signed by AFSANEH Shine NP on 11/16/23 at 12:22 PM EST

## 2023-11-16 NOTE — PLAN OF CARE
Problem: Chronic Conditions and Co-morbidities  Goal: Patient's chronic conditions and co-morbidity symptoms are monitored and maintained or improved  11/16/2023 1702 by Maria E Graf RN  Outcome: Completed  11/16/2023 1221 by Malou Martinez RN  Outcome: Progressing  11/16/2023 0621 by Daina Garcia RN  Outcome: Progressing     Problem: Safety - Adult  Goal: Free from fall injury  11/16/2023 1702 by Maria E Graf RN  Outcome: Completed  11/16/2023 1221 by Malou Martinez RN  Outcome: Progressing  11/16/2023 0621 by Daina Garcia RN  Outcome: Progressing     Problem: Discharge Planning  Goal: Discharge to home or other facility with appropriate resources  11/16/2023 1702 by Maria E Graf RN  Outcome: Completed  11/16/2023 1221 by Malou Martinez RN  Outcome: Progressing  11/16/2023 0621 by Daina Garcia RN  Outcome: Progressing  Flowsheets (Taken 11/15/2023 1829 by Malou Martinez RN)  Discharge to home or other facility with appropriate resources:   Identify barriers to discharge with patient and caregiver   Identify discharge learning needs (meds, wound care, etc)   Arrange for needed discharge resources and transportation as appropriate     Problem: ABCDS Injury Assessment  Goal: Absence of physical injury  11/16/2023 1702 by Maria E Graf RN  Outcome: Completed  11/16/2023 1221 by Malou Martinez RN  Outcome: Progressing  11/16/2023 0621 by Daina Garcia RN  Outcome: Progressing     Problem: Pain  Goal: Verbalizes/displays adequate comfort level or baseline comfort level  11/16/2023 1702 by Maria E Graf RN  Outcome: Completed  11/16/2023 1221 by Malou Martinez RN  Outcome: Progressing

## 2023-11-16 NOTE — PROGRESS NOTES
Nutrition Note    Pt requested to see RD, originally scheduled as outpatient for today. Spoke with Pt and 2 sisters at length (>60 minutes) regarding many dietary needs, including low sodium, low potassium, low phosphorus, heart healthy and diabetic diet. Pt/family initially report concern with controlling BG levels as Pt frequently goes low at night and high during the day. However, they report that PCP has recently adjusted long acting insulin because of this. They report they have worked with dialysis dietitian, though there is some confusion and with adjusting dietary intakes based on lab values. Questions were answered, and encouraged Pt/sisters to maintain consistent meal times, even on non-dialysis days. Strongly encouraged Pt and family to continue working with dialysis dietitian to follow appropriate diet for dialysis, and provided written information on high/low potassium foods and high/low phosphorus foods from [employer]. Also strongly encouraged Plate Method of meal planning, with written resources on Plate Method and CHO Counting provided from Nutrition Care Manual.  Reviewed portion control and encouraged low sodium intake.     Electronically signed by Tomi Washington RD on 11/16/23 at 11:59 AM EST    Contact: [phone]

## 2023-11-16 NOTE — PLAN OF CARE
Problem: Chronic Conditions and Co-morbidities  Goal: Patient's chronic conditions and co-morbidity symptoms are monitored and maintained or improved  11/16/2023 1221 by Noe Dominguez RN  Outcome: Progressing     Problem: Safety - Adult  Goal: Free from fall injury  11/16/2023 1221 by Noe Dominguez RN  Outcome: Progressing     Problem: Discharge Planning  Goal: Discharge to home or other facility with appropriate resources  11/16/2023 1221 by Noe Dominguez RN  Outcome: Progressing     Problem: ABCDS Injury Assessment  Goal: Absence of physical injury  11/16/2023 1221 by Noe Dominguez RN  Outcome: Progressing     Problem: Pain  Goal: Verbalizes/displays adequate comfort level or baseline comfort level  Outcome: Progressing

## 2023-11-16 NOTE — DISCHARGE SUMMARY
[facility]  Discharge Note                 Name:  Pauline Jones  YOB: 1960  Social Security Number:  [id number]  Medical Record Number:  [id number]    Date of Admission:  11/15/2023  Date of Discharge:  11/16/2023    Admitting physician: Patricia Rainey MD    Discharge Attending: AFSANEH Mir NP, CNP  Primary Care Physician: AFSANEH Cisneros CNP  Consultants: none   Discharge to home in stable condition     HOSPITAL ADMISSION PROBLEM LIST:  Patient Active Problem List   Diagnosis    Coronary artery disease with angina pectoris ([address])    Acute kidney injury superimposed on CKD ([address])    Acute on chronic diastolic heart failure ([address])    Diabetic polyneuropathy associated with type 2 diabetes mellitus ([address])    History of coronary artery bypass graft    Iron deficiency anemia    Spinal stenosis of lumbar region with neurogenic claudication    Mixed hyperlipidemia    CKD (chronic kidney disease) stage 4, GFR 15-29 ml/min ([facility])    Type 2 diabetes 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, with long-term current use of insulin ([facility])    Obesity, Class II, BMI 35-39.9    Thyroid nodule greater than or equal to 1 cm in diameter incidentally noted on imaging study    Primary hypertension    Anemia of chronic disease    Chronic ischemic heart disease    Ischemic stroke of frontal lobe ([facility])    Morbid obesity with BMI of 40.0-44.9, adult ([facility])    Disequilibrium syndrome    Anxiety    Chronic midline low back pain with bilateral sciatica    Acute thoracic back pain    COVID    Delirium due to another medical condition    Cerebral hypoperfusion    Immature arteriovenous fistula ([address])    History of fusion of cervical spine    Depression with anxiety    Vancomycin resistant Enterococcus UTI    ESRD on hemodialysis ([address])    Dialysis disequilibrium syndrome    Acute cystitis without hematuria    VRE infection (vancomycin resistant Enterococcus)    Infection due to tablet under the tongue every 5 minutes as needed for Chest pain up to max   of 3 total doses. If no relief after 1 dose, call 911. NovoLOG FlexPen 100 UNIT/ML injection pen; Generic drug: insulin aspart; Inject 17 Units into the skin 3 times daily (before meals) Additionally,   sliding scale coverage as needed 3 times daily: For sugar  no   additional insulin. Sugar 140-199 give 3 units. Sugar 200-249 give 6   units. Sugar 250-299 give 9 units. Sugar 300-349 give 12 units. Sugar   350-400 give 15 units. Sugar over 400 give 18 units  Maximum allowable   amount 103 units daily   polyvinyl alcohol 1.4 % ophthalmic solution; Commonly known as:   LIQUIFILM TEARS   potassium chloride 20 MEQ packet; Commonly known as: KLOR-CON   prasugrel 10 MG Tabs; Commonly known as: EFFIENT; Take 1 tablet by mouth   daily   sodium bicarbonate 650 MG tablet; Take 2 tablets by mouth 3 times daily   sodium chloride 0.65 % nasal spray; Commonly known as: OCEAN, BABY AYR;   1 spray by Nasal route every 4 hours as needed for Congestion   True Metrix Go Glucose Meter w/Device Kit; 1 each by Does not apply   route 4 times daily   * TRUEplus 5-Bevel Pen Needles 29G X 12.7MM Misc; Generic drug: Insulin   Pen Needle; USE FIVE TIMES DAILY   * Easy Touch Pen Needles 29G X 12MM Misc; Generic drug: Insulin Pen   Needle; 5 each by Does not apply route daily   venlafaxine 75 MG extended release capsule; Commonly known as: EFFEXOR   XR; Take 1 capsule by mouth daily (with breakfast)  * This list has 8 medication(s) that are the same as other medications   prescribed for you. Read the directions carefully, and ask your doctor or   other care provider to review them with you.      STOP taking these medications     apixaban 5 MG Tabs tablet; Commonly known as: ELIQUIS          Coronary Discharge Core Measure: Please indicate the medication given by X, and if not the reasons not given:    Not Given Reason  Given      Beta Blockers x

## 2023-11-16 NOTE — PLAN OF CARE
Problem: Chronic Conditions and Co-morbidities  Goal: Patient's chronic conditions and co-morbidity symptoms are monitored and maintained or improved  Outcome: Progressing     Problem: Safety - Adult  Goal: Free from fall injury  Outcome: Progressing     Problem: Discharge Planning  Goal: Discharge to home or other facility with appropriate resources  Outcome: Progressing  Flowsheets (Taken 11/15/2023 1829 by Sharon Carrington RN)  Discharge to home or other facility with appropriate resources:   Identify barriers to discharge with patient and caregiver   Identify discharge learning needs (meds, wound care, etc)   Arrange for needed discharge resources and transportation as appropriate     Problem: ABCDS Injury Assessment  Goal: Absence of physical injury  Outcome: Progressing

## 2023-11-16 NOTE — CARE COORDINATION
Case Management Assessment  Initial Evaluation    Date/Time of Evaluation: 11/16/2023 11:47 AM  Assessment Completed by: Christoph Chong RN    If patient is discharged prior to next notation, then this note serves as note for discharge by case management. Patient Name: Jesu Redman                   YOB: 1960  Diagnosis: Coronary artery disease with angina pectoris, unspecified vessel or lesion type, unspecified whether native or transplanted heart ([address]) [I25.119]  S/P primary angioplasty with coronary stent [Z95.5]                   Date / Time: 11/15/2023 12:06 PM    Patient Admission Status: Inpatient   Readmission Risk (Low < 19, Mod (19-27), High > 27): Readmission Risk Score: 36.2    Current PCP: AFSANEH Chakraborty CNP  PCP verified by CM? (P) Yes    Chart Reviewed: Yes      History Provided by: (P) Patient  Patient Orientation: (P) Alert and Oriented    Patient Cognition: (P) Alert    Hospitalization in the last 30 days (Readmission):  Yes    If yes, Readmission Assessment in  Navigator will be completed.     Advance Directives:      Code Status: Full Code   Patient's Primary Decision Maker is: (P) Legal Next of Kin    Primary Decision Maker: Hailey Gudio - Brother/Sister - [phone]    Secondary Decision Maker: Sola Palma - Brother/Sister - [phone]    Discharge Planning:    Patient lives with: (P) Alone Type of Home: (P) House  Primary Care Giver: (P) Self  Patient Support Systems include: (P) Family Members   Current Financial resources: (P) Medicare, Medicaid  Current community resources:    Current services prior to admission: (P) [address] [place] [address], Durable Medical Equipment            Current DME: (P) Glucometer, Walker, Wheelchair            Type of Home Care services:  (P) OT, PT, Nursing Services    ADLS  Prior functional level: (P) Independent in ADLs/IADLs  Current functional level: (P) Independent in ADLs/IADLs    PT AM-PAC:   /24  OT AM-PAC:   /24    Family can

## 2023-11-17 ENCOUNTER — CARE COORDINATION (OUTPATIENT)
Dept: CASE MANAGEMENT | Age: 63
End: 2023-11-17

## 2023-11-17 DIAGNOSIS — Z95.820 S/P ANGIOPLASTY WITH STENT: Primary | ICD-10-CM

## 2023-11-17 PROCEDURE — 1111F DSCHRG MED/CURRENT MED MERGE: CPT | Performed by: NURSE PRACTITIONER

## 2023-11-17 NOTE — CARE COORDINATION
Care Transitions Initial Follow Up Call    Call within 2 business days of discharge: Yes    Patient Current Location:  Home: [address] [place] [address]    Care Transition Nurse contacted the patient by telephone to perform post hospital discharge assessment. Verified name and  with patient as identifiers. Provided introduction to self, and explanation of the Care Transition Nurse role. Patient: Devon Quintero Patient : 1960   MRN: [id number]  Reason for Admission: [address] STV  Discharge Date: 23 RARS: Readmission Risk Score: 36.1      Last Discharge Facility       Date Complaint Diagnosis Description Type Department Provider    11/15/23  S/P primary angioplasty with coronary stent . .. Admission (Discharged) Steve Bosch 2 Cely Boyd MD            Was this an external facility discharge? No Discharge Facility: [facility]    Challenges to be reviewed by the provider   Additional needs identified to be addressed with provider: Yes  medications-Patient wanting clarification why Eliquis was stopped                Method of communication with provider: none. Spoke with Danitza Licea this morning, she is currently at dialysis. Patient came in on Wednesday for planned cardiac cath and stent placement. She had 4 stents placed, feeling pretty tired today but denies any chest pains or shortness of breath. Groin site is tender but no redness, drainage, hardened area or redness. She is getting dialysis today d/t missing yesterday while inpatient. She has [facility] care that will resume her care. Discharge instructions reviewed, 1 medication change, Eliquis was stopped this admission, patient is unsure why, was taking both the Eliquis and the Effient in past couple of weeks. Discharge instructions reviewed, will call [facility] for clarification on the Eliquis. TC to [facility], had to leave message on nurse line requesting return call.   Expressed to patient I would attempt to find out why this was stopped as

## 2023-11-28 ENCOUNTER — CARE COORDINATION (OUTPATIENT)
Dept: CASE MANAGEMENT | Age: 63
End: 2023-11-28

## 2023-11-28 NOTE — CARE COORDINATION
Care Transitions Outreach Attempt    Call within 2 business days of discharge: Yes   Attempted to reach patient for transitions of care follow up. Unable to reach patient. 1st attempt to reach. Noted patient had follow up with cardiology last week, notes reviewed. Patient: Tia Herb Patient : 1960 MRN: [id number]    Last Discharge Facility       Date Complaint Diagnosis Description Type Department Provider    11/15/23  S/P primary angioplasty with coronary stent . .. Admission (Discharged) Carmel Grissom 2 Santino Hutchinson MD              Was this an external facility discharge?  No Discharge Facility Name: [address] [place] [address]    Noted following upcoming appointments from discharge chart review:   [facility] follow up appointment(s):   Future Appointments   Date Time Provider [address]   2024  4:00 PM Rustam Katz DO [facility]     Non-[facility]  follow up appointment(s): N/A

## 2023-11-29 ENCOUNTER — CARE COORDINATION (OUTPATIENT)
Dept: CASE MANAGEMENT | Age: 63
End: 2023-11-29

## 2023-11-29 ENCOUNTER — HOSPITAL ENCOUNTER (INPATIENT)
Age: 63
LOS: 7 days | Discharge: NURSING, REHAB OR LONG-TERM CARE | DRG: 313 | End: 2023-12-06
Attending: EMERGENCY MEDICINE | Admitting: FAMILY MEDICINE
Payer: COMMERCIAL

## 2023-11-29 ENCOUNTER — APPOINTMENT (OUTPATIENT)
Dept: GENERAL RADIOLOGY | Age: 63
DRG: 313 | End: 2023-11-29
Payer: COMMERCIAL

## 2023-11-29 DIAGNOSIS — R07.9 CHEST PAIN, UNSPECIFIED TYPE: Primary | ICD-10-CM

## 2023-11-29 DIAGNOSIS — I21.4 NSTEMI (NON-ST ELEVATED MYOCARDIAL INFARCTION) (HCC): ICD-10-CM

## 2023-11-29 DIAGNOSIS — Z79.4 DIABETES MELLITUS DUE TO UNDERLYING CONDITION WITH DIABETIC NEPHROPATHY, WITH LONG-TERM CURRENT USE OF INSULIN (HCC): ICD-10-CM

## 2023-11-29 DIAGNOSIS — E08.21 DIABETES MELLITUS DUE TO UNDERLYING CONDITION WITH DIABETIC NEPHROPATHY, WITH LONG-TERM CURRENT USE OF INSULIN (HCC): ICD-10-CM

## 2023-11-29 LAB
ALBUMIN SERPL-MCNC: 4.1 G/DL (ref 3.5–5.2)
ALP SERPL-CCNC: 196 U/L (ref 35–104)
ALT SERPL-CCNC: 59 U/L (ref 5–33)
ANION GAP SERPL CALCULATED.3IONS-SCNC: 18 MMOL/L (ref 9–17)
AST SERPL-CCNC: 55 U/L
BASOPHILS # BLD: 0.07 K/UL (ref 0–0.2)
BASOPHILS NFR BLD: 1 % (ref 0–2)
BILIRUB SERPL-MCNC: 0.7 MG/DL (ref 0.3–1.2)
BUN SERPL-MCNC: 27 MG/DL (ref 8–23)
CALCIUM SERPL-MCNC: 9.9 MG/DL (ref 8.6–10.4)
CHLORIDE SERPL-SCNC: 93 MMOL/L (ref 98–107)
CO2 SERPL-SCNC: 27 MMOL/L (ref 20–31)
CREAT SERPL-MCNC: 2.6 MG/DL (ref 0.5–0.9)
EOSINOPHIL # BLD: 0.21 K/UL (ref 0–0.4)
EOSINOPHILS RELATIVE PERCENT: 3 % (ref 0–4)
ERYTHROCYTE [DISTWIDTH] IN BLOOD BY AUTOMATED COUNT: 18.2 % (ref 11.5–14.9)
GFR SERPL CREATININE-BSD FRML MDRD: 20 ML/MIN/1.73M2
GLUCOSE BLD-MCNC: 331 MG/DL (ref 65–105)
GLUCOSE SERPL-MCNC: 332 MG/DL (ref 70–99)
HCT VFR BLD AUTO: 28.7 % (ref 36–46)
HGB BLD-MCNC: 9.4 G/DL (ref 12–16)
INR PPP: 0.9
LIPASE SERPL-CCNC: 24 U/L (ref 13–60)
LYMPHOCYTES NFR BLD: 1.19 K/UL (ref 1–4.8)
LYMPHOCYTES RELATIVE PERCENT: 17 % (ref 24–44)
MAGNESIUM SERPL-MCNC: 2.5 MG/DL (ref 1.6–2.6)
MCH RBC QN AUTO: 36.1 PG (ref 26–34)
MCHC RBC AUTO-ENTMCNC: 32.7 G/DL (ref 31–37)
MCV RBC AUTO: 110.3 FL (ref 80–100)
MONOCYTES NFR BLD: 0.56 K/UL (ref 0.1–1.3)
MONOCYTES NFR BLD: 8 % (ref 1–7)
MORPHOLOGY: ABNORMAL
NEUTROPHILS NFR BLD: 71 % (ref 36–66)
NEUTS SEG NFR BLD: 4.97 K/UL (ref 1.3–9.1)
PLATELET # BLD AUTO: 179 K/UL (ref 150–450)
PMV BLD AUTO: 7.6 FL (ref 6–12)
POTASSIUM SERPL-SCNC: 3.8 MMOL/L (ref 3.7–5.3)
PROT SERPL-MCNC: 7.9 G/DL (ref 6.4–8.3)
PROTHROMBIN TIME: 12.4 SEC (ref 11.8–14.6)
RBC # BLD AUTO: 2.6 M/UL (ref 4–5.2)
RETICS # AUTO: 0.15 M/UL (ref 0.02–0.1)
RETICS/RBC NFR AUTO: 5.9 % (ref 0.5–2)
SODIUM SERPL-SCNC: 138 MMOL/L (ref 135–144)
TROPONIN I SERPL HS-MCNC: 107 NG/L (ref 0–14)
TROPONIN I SERPL HS-MCNC: 116 NG/L (ref 0–14)
WBC OTHER # BLD: 7 K/UL (ref 3.5–11)

## 2023-11-29 PROCEDURE — 71045 X-RAY EXAM CHEST 1 VIEW: CPT

## 2023-11-29 PROCEDURE — 82947 ASSAY GLUCOSE BLOOD QUANT: CPT

## 2023-11-29 PROCEDURE — 85610 PROTHROMBIN TIME: CPT

## 2023-11-29 PROCEDURE — 80053 COMPREHEN METABOLIC PANEL: CPT

## 2023-11-29 PROCEDURE — 83735 ASSAY OF MAGNESIUM: CPT

## 2023-11-29 PROCEDURE — 6360000002 HC RX W HCPCS: Performed by: EMERGENCY MEDICINE

## 2023-11-29 PROCEDURE — 96374 THER/PROPH/DIAG INJ IV PUSH: CPT

## 2023-11-29 PROCEDURE — 85025 COMPLETE CBC W/AUTO DIFF WBC: CPT

## 2023-11-29 PROCEDURE — 6370000000 HC RX 637 (ALT 250 FOR IP): Performed by: FAMILY MEDICINE

## 2023-11-29 PROCEDURE — 93005 ELECTROCARDIOGRAM TRACING: CPT | Performed by: EMERGENCY MEDICINE

## 2023-11-29 PROCEDURE — 84484 ASSAY OF TROPONIN QUANT: CPT

## 2023-11-29 PROCEDURE — 85045 AUTOMATED RETICULOCYTE COUNT: CPT

## 2023-11-29 PROCEDURE — 83690 ASSAY OF LIPASE: CPT

## 2023-11-29 PROCEDURE — 2060000000 HC ICU INTERMEDIATE R&B

## 2023-11-29 PROCEDURE — 36415 COLL VENOUS BLD VENIPUNCTURE: CPT

## 2023-11-29 PROCEDURE — 99285 EMERGENCY DEPT VISIT HI MDM: CPT

## 2023-11-29 RX ORDER — ACETAMINOPHEN 650 MG/1
650 SUPPOSITORY RECTAL EVERY 6 HOURS PRN
Status: DISCONTINUED | OUTPATIENT
Start: 2023-11-29 | End: 2023-12-06 | Stop reason: HOSPADM

## 2023-11-29 RX ORDER — SODIUM CHLORIDE 0.9 % (FLUSH) 0.9 %
10 SYRINGE (ML) INJECTION PRN
Status: DISCONTINUED | OUTPATIENT
Start: 2023-11-29 | End: 2023-12-06 | Stop reason: HOSPADM

## 2023-11-29 RX ORDER — ALLOPURINOL 100 MG/1
100 TABLET ORAL DAILY
Status: DISCONTINUED | OUTPATIENT
Start: 2023-11-30 | End: 2023-12-06 | Stop reason: HOSPADM

## 2023-11-29 RX ORDER — CYCLOBENZAPRINE HCL 10 MG
10 TABLET ORAL 3 TIMES DAILY PRN
Status: DISCONTINUED | OUTPATIENT
Start: 2023-11-29 | End: 2023-12-06 | Stop reason: HOSPADM

## 2023-11-29 RX ORDER — INSULIN GLARGINE 100 [IU]/ML
62 INJECTION, SOLUTION SUBCUTANEOUS 2 TIMES DAILY
Status: DISCONTINUED | OUTPATIENT
Start: 2023-11-29 | End: 2023-12-06 | Stop reason: HOSPADM

## 2023-11-29 RX ORDER — NITROGLYCERIN 0.4 MG/1
0.4 TABLET SUBLINGUAL EVERY 5 MIN PRN
Status: DISCONTINUED | OUTPATIENT
Start: 2023-11-29 | End: 2023-12-06 | Stop reason: HOSPADM

## 2023-11-29 RX ORDER — HEPARIN SODIUM 1000 [USP'U]/ML
4000 INJECTION, SOLUTION INTRAVENOUS; SUBCUTANEOUS ONCE
Status: COMPLETED | OUTPATIENT
Start: 2023-11-29 | End: 2023-11-29

## 2023-11-29 RX ORDER — DOCUSATE SODIUM 100 MG/1
100 CAPSULE, LIQUID FILLED ORAL 2 TIMES DAILY
Status: DISCONTINUED | OUTPATIENT
Start: 2023-11-29 | End: 2023-12-06 | Stop reason: HOSPADM

## 2023-11-29 RX ORDER — HEPARIN SODIUM 1000 [USP'U]/ML
4000 INJECTION, SOLUTION INTRAVENOUS; SUBCUTANEOUS PRN
Status: DISCONTINUED | OUTPATIENT
Start: 2023-11-29 | End: 2023-11-30

## 2023-11-29 RX ORDER — ENOXAPARIN SODIUM 100 MG/ML
40 INJECTION SUBCUTANEOUS DAILY
Status: DISCONTINUED | OUTPATIENT
Start: 2023-11-30 | End: 2023-11-29 | Stop reason: SDUPTHER

## 2023-11-29 RX ORDER — ATORVASTATIN CALCIUM 80 MG/1
80 TABLET, FILM COATED ORAL NIGHTLY
Status: DISCONTINUED | OUTPATIENT
Start: 2023-11-29 | End: 2023-12-06 | Stop reason: HOSPADM

## 2023-11-29 RX ORDER — ATORVASTATIN CALCIUM 40 MG/1
40 TABLET, FILM COATED ORAL NIGHTLY
Status: DISCONTINUED | OUTPATIENT
Start: 2023-11-29 | End: 2023-11-29 | Stop reason: SDUPTHER

## 2023-11-29 RX ORDER — PRASUGREL 10 MG/1
10 TABLET, FILM COATED ORAL DAILY
Status: DISCONTINUED | OUTPATIENT
Start: 2023-11-30 | End: 2023-12-06 | Stop reason: HOSPADM

## 2023-11-29 RX ORDER — GABAPENTIN 300 MG/1
300 CAPSULE ORAL 2 TIMES DAILY
Status: DISCONTINUED | OUTPATIENT
Start: 2023-11-29 | End: 2023-12-06 | Stop reason: HOSPADM

## 2023-11-29 RX ORDER — ASPIRIN 81 MG/1
81 TABLET, CHEWABLE ORAL DAILY
Status: DISCONTINUED | OUTPATIENT
Start: 2023-11-30 | End: 2023-12-06 | Stop reason: HOSPADM

## 2023-11-29 RX ORDER — SODIUM CHLORIDE 0.9 % (FLUSH) 0.9 %
5-40 SYRINGE (ML) INJECTION EVERY 12 HOURS SCHEDULED
Status: DISCONTINUED | OUTPATIENT
Start: 2023-11-29 | End: 2023-12-06 | Stop reason: HOSPADM

## 2023-11-29 RX ORDER — HEPARIN SODIUM 10000 [USP'U]/100ML
5-30 INJECTION, SOLUTION INTRAVENOUS CONTINUOUS
Status: DISCONTINUED | OUTPATIENT
Start: 2023-11-29 | End: 2023-11-30

## 2023-11-29 RX ORDER — CARBOXYMETHYLCELLULOSE SODIUM 10 MG/ML
1 GEL OPHTHALMIC PRN
Status: DISCONTINUED | OUTPATIENT
Start: 2023-11-29 | End: 2023-12-06 | Stop reason: HOSPADM

## 2023-11-29 RX ORDER — LIDOCAINE AND PRILOCAINE 25; 25 MG/G; MG/G
CREAM TOPICAL PRN
Status: DISCONTINUED | OUTPATIENT
Start: 2023-11-29 | End: 2023-12-06 | Stop reason: HOSPADM

## 2023-11-29 RX ORDER — ACETAMINOPHEN 325 MG/1
650 TABLET ORAL EVERY 6 HOURS PRN
Status: DISCONTINUED | OUTPATIENT
Start: 2023-11-29 | End: 2023-12-06 | Stop reason: HOSPADM

## 2023-11-29 RX ORDER — ACETAMINOPHEN 325 MG/1
650 TABLET ORAL EVERY 6 HOURS PRN
Status: DISCONTINUED | OUTPATIENT
Start: 2023-11-29 | End: 2023-12-04 | Stop reason: SDUPTHER

## 2023-11-29 RX ORDER — BUMETANIDE 1 MG/1
2 TABLET ORAL 2 TIMES DAILY
Status: DISCONTINUED | OUTPATIENT
Start: 2023-11-29 | End: 2023-11-29 | Stop reason: SDUPTHER

## 2023-11-29 RX ORDER — PHENOL 1.4 %
10 AEROSOL, SPRAY (ML) MUCOUS MEMBRANE NIGHTLY PRN
Status: DISCONTINUED | OUTPATIENT
Start: 2023-11-29 | End: 2023-11-29 | Stop reason: CLARIF

## 2023-11-29 RX ORDER — ONDANSETRON 4 MG/1
4 TABLET, ORALLY DISINTEGRATING ORAL EVERY 8 HOURS PRN
Status: DISCONTINUED | OUTPATIENT
Start: 2023-11-29 | End: 2023-12-06 | Stop reason: HOSPADM

## 2023-11-29 RX ORDER — ONDANSETRON 2 MG/ML
4 INJECTION INTRAMUSCULAR; INTRAVENOUS EVERY 6 HOURS PRN
Status: DISCONTINUED | OUTPATIENT
Start: 2023-11-29 | End: 2023-12-06 | Stop reason: HOSPADM

## 2023-11-29 RX ORDER — VENLAFAXINE HYDROCHLORIDE 75 MG/1
75 CAPSULE, EXTENDED RELEASE ORAL
Status: DISCONTINUED | OUTPATIENT
Start: 2023-11-30 | End: 2023-12-06 | Stop reason: HOSPADM

## 2023-11-29 RX ORDER — BUSPIRONE HYDROCHLORIDE 10 MG/1
10 TABLET ORAL 3 TIMES DAILY
Status: DISCONTINUED | OUTPATIENT
Start: 2023-11-29 | End: 2023-12-06 | Stop reason: HOSPADM

## 2023-11-29 RX ORDER — SODIUM CHLORIDE 9 MG/ML
INJECTION, SOLUTION INTRAVENOUS PRN
Status: DISCONTINUED | OUTPATIENT
Start: 2023-11-29 | End: 2023-12-06 | Stop reason: HOSPADM

## 2023-11-29 RX ORDER — HEPARIN SODIUM 1000 [USP'U]/ML
2000 INJECTION, SOLUTION INTRAVENOUS; SUBCUTANEOUS PRN
Status: DISCONTINUED | OUTPATIENT
Start: 2023-11-29 | End: 2023-11-30

## 2023-11-29 RX ORDER — CARVEDILOL 3.12 MG/1
3.12 TABLET ORAL 2 TIMES DAILY
Status: DISCONTINUED | OUTPATIENT
Start: 2023-11-29 | End: 2023-12-06 | Stop reason: HOSPADM

## 2023-11-29 RX ORDER — POTASSIUM CHLORIDE 1.5 G/1.58G
20 POWDER, FOR SOLUTION ORAL DAILY
Status: DISCONTINUED | OUTPATIENT
Start: 2023-11-30 | End: 2023-12-06 | Stop reason: HOSPADM

## 2023-11-29 RX ORDER — LANOLIN ALCOHOL/MO/W.PET/CERES
3 CREAM (GRAM) TOPICAL NIGHTLY PRN
Status: DISCONTINUED | OUTPATIENT
Start: 2023-11-29 | End: 2023-12-06 | Stop reason: HOSPADM

## 2023-11-29 RX ORDER — MIDODRINE HYDROCHLORIDE 5 MG/1
5 TABLET ORAL PRN
Status: DISCONTINUED | OUTPATIENT
Start: 2023-11-29 | End: 2023-12-06 | Stop reason: HOSPADM

## 2023-11-29 RX ORDER — BUMETANIDE 1 MG/1
3 TABLET ORAL 2 TIMES DAILY
Status: DISCONTINUED | OUTPATIENT
Start: 2023-11-29 | End: 2023-12-06 | Stop reason: HOSPADM

## 2023-11-29 RX ORDER — SODIUM BICARBONATE 650 MG/1
1300 TABLET ORAL 3 TIMES DAILY
Status: DISCONTINUED | OUTPATIENT
Start: 2023-11-29 | End: 2023-12-06

## 2023-11-29 RX ADMIN — HEPARIN SODIUM 4000 UNITS: 1000 INJECTION INTRAVENOUS; SUBCUTANEOUS at 20:51

## 2023-11-29 RX ADMIN — ATORVASTATIN CALCIUM 80 MG: 80 TABLET, FILM COATED ORAL at 22:54

## 2023-11-29 RX ADMIN — DOCUSATE SODIUM 100 MG: 100 CAPSULE, LIQUID FILLED ORAL at 22:55

## 2023-11-29 RX ADMIN — INSULIN GLARGINE 62 UNITS: 100 INJECTION, SOLUTION SUBCUTANEOUS at 22:55

## 2023-11-29 RX ADMIN — CARVEDILOL 3.12 MG: 3.12 TABLET, FILM COATED ORAL at 22:54

## 2023-11-29 RX ADMIN — HEPARIN SODIUM 9 UNITS/KG/HR: 10000 INJECTION, SOLUTION INTRAVENOUS at 20:51

## 2023-11-29 RX ADMIN — ACETAMINOPHEN 650 MG: 325 TABLET ORAL at 22:54

## 2023-11-29 RX ADMIN — Medication 3 MG: at 22:54

## 2023-11-29 RX ADMIN — BUMETANIDE 3 MG: 1 TABLET ORAL at 22:54

## 2023-11-29 RX ADMIN — BUSPIRONE HYDROCHLORIDE 10 MG: 10 TABLET ORAL at 22:54

## 2023-11-29 RX ADMIN — GABAPENTIN 300 MG: 300 CAPSULE ORAL at 22:54

## 2023-11-29 RX ADMIN — SODIUM BICARBONATE 1300 MG: 650 TABLET ORAL at 22:54

## 2023-11-29 ASSESSMENT — PAIN DESCRIPTION - DESCRIPTORS: DESCRIPTORS: SORE

## 2023-11-29 ASSESSMENT — PAIN DESCRIPTION - LOCATION
LOCATION: CHEST
LOCATION: FOOT

## 2023-11-29 ASSESSMENT — PAIN - FUNCTIONAL ASSESSMENT
PAIN_FUNCTIONAL_ASSESSMENT: 0-10
PAIN_FUNCTIONAL_ASSESSMENT: ACTIVITIES ARE NOT PREVENTED

## 2023-11-29 ASSESSMENT — ENCOUNTER SYMPTOMS
COLOR CHANGE: 0
BACK PAIN: 0
EYE PAIN: 0
ABDOMINAL PAIN: 0
SHORTNESS OF BREATH: 0

## 2023-11-29 ASSESSMENT — PAIN SCALES - GENERAL
PAINLEVEL_OUTOF10: 10
PAINLEVEL_OUTOF10: 6

## 2023-11-29 ASSESSMENT — PAIN DESCRIPTION - ORIENTATION: ORIENTATION: RIGHT;LEFT

## 2023-11-29 NOTE — CARE COORDINATION
Care Transitions Follow Up Call    Patient Current Location:  Home: [address] [place] [address] Care Coordinator contacted the patient by telephone to follow up after admission on 1/15 . Verified name and  with patient as identifiers. Patient: Luke Zaman  Patient : 1960   MRN: [id number]  Reason for Admission: S/P primary angioplasty with coronary stent   Discharge Date: 23 RARS: Readmission Risk Score: 36.1      Needs to be reviewed by the provider   Additional needs identified to be addressed with provider: Yes  Spoke to patient for a DAMI call she said she was okay but I think I hurt my ribs. Yesterday while leaning over the arm of her chair trying to  her cell phone she heard a crack and has worsening shortness of breath and pain. Writer asked if she had let her PCP know and cardiology know she said no but she will call them right now. Writer advised her to go get checked out at ER, Writer tried to call your office. Method of communication with provider: chart routing. Subsequent transitional call. Spoke to Malini Foster today . Writer asked how she was doing she sates I'm okay but I think I hurt my ribs. She said yesterday she leaned over arm of the chair to  cell phone. She said she heard a crack and has worsening sob. Writer asked if she had let her PCP know. She said no. I just got stents. I'm going to call my cardiologist. Sachin Goins advised patient go to ER. To be evaluated. Writer called PCP office phone was disconnected message routed to pcp office. Addressed changes since last contact:   rib pain   Discussed follow-up appointments. If no appointment was previously scheduled, appointment scheduling offered: Yes. Is follow up appointment scheduled within 7 days of discharge? Yes.     Follow Up  Future Appointments   Date Time Provider [address]   2024  4:00 PM Rustam Katz DO [facility]         LPN Care Coordinator

## 2023-11-29 NOTE — ED NOTES
Mode of arrival (squad #, walk in, police, etc) : Walk in        Chief complaint(s): Chest pain        Arrival Note (brief scenario, treatment PTA, etc). : Pt to the ED after recent stent placement at 's with report of mid sternal chest pain with radiation to under her left breast. Pt denies being SOB however after ambulating she was maintaining a spo2 of 88% RA. After resting pt was maintaining 94% RA. Pt is A&Ox4 and accompanied by her sister to the ED today        C= \"Have you ever felt that you should Cut down on your drinking? \"  No  A= \"Have people Annoyed you by criticizing your drinking? \"  No  G= \"Have you ever felt bad or Guilty about your drinking? \"  No  E= \"Have you ever had a drink as an Eye-opener first thing in the morning to steady your nerves or to help a hangover? \"  No      Deferred []      Reason for deferring: N/A    *If yes to two or more: probable alcohol abuse. Isai Celaya RN  11/29/23 3207

## 2023-11-30 ENCOUNTER — APPOINTMENT (OUTPATIENT)
Dept: NUCLEAR MEDICINE | Age: 63
DRG: 313 | End: 2023-11-30
Payer: COMMERCIAL

## 2023-11-30 ENCOUNTER — APPOINTMENT (OUTPATIENT)
Dept: GENERAL RADIOLOGY | Age: 63
DRG: 313 | End: 2023-11-30
Payer: COMMERCIAL

## 2023-11-30 LAB
ANTI-XA UNFRAC HEPARIN: 0.29 IU/L (ref 0.3–0.7)
ANTI-XA UNFRAC HEPARIN: <0.1 IU/L (ref 0.3–0.7)
CHOLEST SERPL-MCNC: 118 MG/DL
CHOLESTEROL/HDL RATIO: 3.1
EKG ATRIAL RATE: 88 BPM
EKG P AXIS: 29 DEGREES
EKG P-R INTERVAL: 152 MS
EKG Q-T INTERVAL: 414 MS
EKG QRS DURATION: 100 MS
EKG QTC CALCULATION (BAZETT): 500 MS
EKG R AXIS: 34 DEGREES
EKG T AXIS: -170 DEGREES
EKG VENTRICULAR RATE: 88 BPM
ERYTHROCYTE [DISTWIDTH] IN BLOOD BY AUTOMATED COUNT: 17.7 % (ref 11.5–14.9)
GLUCOSE BLD-MCNC: 257 MG/DL (ref 65–105)
GLUCOSE BLD-MCNC: 406 MG/DL (ref 65–105)
GLUCOSE BLD-MCNC: 461 MG/DL (ref 65–105)
GLUCOSE BLD-MCNC: 494 MG/DL (ref 65–105)
HCT VFR BLD AUTO: 28.1 % (ref 36–46)
HDLC SERPL-MCNC: 38 MG/DL
HGB BLD-MCNC: 9.2 G/DL (ref 12–16)
LDLC SERPL CALC-MCNC: 14 MG/DL (ref 0–130)
MAGNESIUM SERPL-MCNC: 2.6 MG/DL (ref 1.6–2.6)
MCH RBC QN AUTO: 36.1 PG (ref 26–34)
MCHC RBC AUTO-ENTMCNC: 32.9 G/DL (ref 31–37)
MCV RBC AUTO: 109.8 FL (ref 80–100)
PLATELET # BLD AUTO: 184 K/UL (ref 150–450)
PMV BLD AUTO: 8 FL (ref 6–12)
RBC # BLD AUTO: 2.56 M/UL (ref 4–5.2)
TRIGL SERPL-MCNC: 329 MG/DL
WBC OTHER # BLD: 6.1 K/UL (ref 3.5–11)

## 2023-11-30 PROCEDURE — 83735 ASSAY OF MAGNESIUM: CPT

## 2023-11-30 PROCEDURE — 82947 ASSAY GLUCOSE BLOOD QUANT: CPT

## 2023-11-30 PROCEDURE — 3430000000 HC RX DIAGNOSTIC RADIOPHARMACEUTICAL: Performed by: INTERNAL MEDICINE

## 2023-11-30 PROCEDURE — 6360000002 HC RX W HCPCS: Performed by: FAMILY MEDICINE

## 2023-11-30 PROCEDURE — 85027 COMPLETE CBC AUTOMATED: CPT

## 2023-11-30 PROCEDURE — 99223 1ST HOSP IP/OBS HIGH 75: CPT | Performed by: FAMILY MEDICINE

## 2023-11-30 PROCEDURE — A9539 TC99M PENTETATE: HCPCS | Performed by: INTERNAL MEDICINE

## 2023-11-30 PROCEDURE — A9540 TC99M MAA: HCPCS | Performed by: INTERNAL MEDICINE

## 2023-11-30 PROCEDURE — 2580000003 HC RX 258: Performed by: INTERNAL MEDICINE

## 2023-11-30 PROCEDURE — 80061 LIPID PANEL: CPT

## 2023-11-30 PROCEDURE — 78582 LUNG VENTILAT&PERFUS IMAGING: CPT

## 2023-11-30 PROCEDURE — 6370000000 HC RX 637 (ALT 250 FOR IP): Performed by: FAMILY MEDICINE

## 2023-11-30 PROCEDURE — 93010 ELECTROCARDIOGRAM REPORT: CPT | Performed by: INTERNAL MEDICINE

## 2023-11-30 PROCEDURE — 85520 HEPARIN ASSAY: CPT

## 2023-11-30 PROCEDURE — 2580000003 HC RX 258: Performed by: FAMILY MEDICINE

## 2023-11-30 PROCEDURE — 36415 COLL VENOUS BLD VENIPUNCTURE: CPT

## 2023-11-30 PROCEDURE — 99223 1ST HOSP IP/OBS HIGH 75: CPT | Performed by: INTERNAL MEDICINE

## 2023-11-30 PROCEDURE — 2060000000 HC ICU INTERMEDIATE R&B

## 2023-11-30 PROCEDURE — 71111 X-RAY EXAM RIBS/CHEST4/> VWS: CPT

## 2023-11-30 RX ORDER — SODIUM CHLORIDE 0.9 % (FLUSH) 0.9 %
10 SYRINGE (ML) INJECTION PRN
Status: DISCONTINUED | OUTPATIENT
Start: 2023-11-30 | End: 2023-12-06 | Stop reason: HOSPADM

## 2023-11-30 RX ORDER — INSULIN LISPRO 100 [IU]/ML
0-16 INJECTION, SOLUTION INTRAVENOUS; SUBCUTANEOUS
Status: DISCONTINUED | OUTPATIENT
Start: 2023-11-30 | End: 2023-12-06 | Stop reason: HOSPADM

## 2023-11-30 RX ORDER — INSULIN LISPRO 100 [IU]/ML
15 INJECTION, SOLUTION INTRAVENOUS; SUBCUTANEOUS ONCE
Status: COMPLETED | OUTPATIENT
Start: 2023-11-30 | End: 2023-11-30

## 2023-11-30 RX ORDER — KIT FOR THE PREPARATION OF TECHNETIUM TC 99M PENTETATE 20 MG/1
40 INJECTION, POWDER, LYOPHILIZED, FOR SOLUTION INTRAVENOUS; RESPIRATORY (INHALATION)
Status: COMPLETED | OUTPATIENT
Start: 2023-11-30 | End: 2023-11-30

## 2023-11-30 RX ORDER — DEXTROSE MONOHYDRATE 100 MG/ML
INJECTION, SOLUTION INTRAVENOUS CONTINUOUS PRN
Status: DISCONTINUED | OUTPATIENT
Start: 2023-11-30 | End: 2023-12-06 | Stop reason: HOSPADM

## 2023-11-30 RX ORDER — INSULIN LISPRO 100 [IU]/ML
10 INJECTION, SOLUTION INTRAVENOUS; SUBCUTANEOUS ONCE
Status: COMPLETED | OUTPATIENT
Start: 2023-11-30 | End: 2023-11-30

## 2023-11-30 RX ORDER — INSULIN LISPRO 100 [IU]/ML
0-4 INJECTION, SOLUTION INTRAVENOUS; SUBCUTANEOUS NIGHTLY
Status: DISCONTINUED | OUTPATIENT
Start: 2023-11-30 | End: 2023-12-06 | Stop reason: HOSPADM

## 2023-11-30 RX ADMIN — SODIUM CHLORIDE, PRESERVATIVE FREE 10 ML: 5 INJECTION INTRAVENOUS at 13:21

## 2023-11-30 RX ADMIN — BUSPIRONE HYDROCHLORIDE 10 MG: 10 TABLET ORAL at 21:41

## 2023-11-30 RX ADMIN — INSULIN GLARGINE 62 UNITS: 100 INJECTION, SOLUTION SUBCUTANEOUS at 11:57

## 2023-11-30 RX ADMIN — ALLOPURINOL 100 MG: 100 TABLET ORAL at 10:54

## 2023-11-30 RX ADMIN — INSULIN LISPRO 16 UNITS: 100 INJECTION, SOLUTION INTRAVENOUS; SUBCUTANEOUS at 17:44

## 2023-11-30 RX ADMIN — CARVEDILOL 3.12 MG: 3.12 TABLET, FILM COATED ORAL at 10:54

## 2023-11-30 RX ADMIN — VENLAFAXINE HYDROCHLORIDE 75 MG: 75 CAPSULE, EXTENDED RELEASE ORAL at 10:54

## 2023-11-30 RX ADMIN — Medication 3 MG: at 21:41

## 2023-11-30 RX ADMIN — BUSPIRONE HYDROCHLORIDE 10 MG: 10 TABLET ORAL at 10:54

## 2023-11-30 RX ADMIN — INSULIN LISPRO 15 UNITS: 100 INJECTION, SOLUTION INTRAVENOUS; SUBCUTANEOUS at 21:41

## 2023-11-30 RX ADMIN — INSULIN LISPRO 16 UNITS: 100 INJECTION, SOLUTION INTRAVENOUS; SUBCUTANEOUS at 12:40

## 2023-11-30 RX ADMIN — GABAPENTIN 300 MG: 300 CAPSULE ORAL at 10:54

## 2023-11-30 RX ADMIN — ATORVASTATIN CALCIUM 80 MG: 80 TABLET, FILM COATED ORAL at 21:41

## 2023-11-30 RX ADMIN — HEPARIN SODIUM 4000 UNITS: 1000 INJECTION INTRAVENOUS; SUBCUTANEOUS at 03:16

## 2023-11-30 RX ADMIN — DOCUSATE SODIUM 100 MG: 100 CAPSULE, LIQUID FILLED ORAL at 21:41

## 2023-11-30 RX ADMIN — HEPARIN SODIUM 2000 UNITS: 1000 INJECTION INTRAVENOUS; SUBCUTANEOUS at 11:02

## 2023-11-30 RX ADMIN — ASPIRIN 81 MG: 81 TABLET, CHEWABLE ORAL at 10:55

## 2023-11-30 RX ADMIN — DOCUSATE SODIUM 100 MG: 100 CAPSULE, LIQUID FILLED ORAL at 10:54

## 2023-11-30 RX ADMIN — GABAPENTIN 300 MG: 300 CAPSULE ORAL at 21:41

## 2023-11-30 RX ADMIN — SODIUM BICARBONATE 1300 MG: 650 TABLET ORAL at 21:41

## 2023-11-30 RX ADMIN — SODIUM CHLORIDE, PRESERVATIVE FREE 10 ML: 5 INJECTION INTRAVENOUS at 21:42

## 2023-11-30 RX ADMIN — SODIUM BICARBONATE 1300 MG: 650 TABLET ORAL at 10:54

## 2023-11-30 RX ADMIN — KIT FOR THE PREPARATION OF TECHNETIUM TC 99M PENTETATE 42.2 MILLICURIE: 20 INJECTION, POWDER, LYOPHILIZED, FOR SOLUTION INTRAVENOUS; RESPIRATORY (INHALATION) at 12:57

## 2023-11-30 RX ADMIN — BUMETANIDE 3 MG: 1 TABLET ORAL at 10:54

## 2023-11-30 RX ADMIN — BUMETANIDE 3 MG: 1 TABLET ORAL at 21:41

## 2023-11-30 RX ADMIN — INSULIN GLARGINE 62 UNITS: 100 INJECTION, SOLUTION SUBCUTANEOUS at 21:41

## 2023-11-30 RX ADMIN — Medication 7.4 MILLICURIE: at 13:21

## 2023-11-30 RX ADMIN — POTASSIUM CHLORIDE 20 MEQ: 1.5 FOR SOLUTION ORAL at 10:55

## 2023-11-30 RX ADMIN — CARVEDILOL 3.12 MG: 3.12 TABLET, FILM COATED ORAL at 21:41

## 2023-11-30 RX ADMIN — SODIUM BICARBONATE 1300 MG: 650 TABLET ORAL at 16:17

## 2023-11-30 RX ADMIN — BUSPIRONE HYDROCHLORIDE 10 MG: 10 TABLET ORAL at 16:16

## 2023-11-30 RX ADMIN — INSULIN LISPRO 10 UNITS: 100 INJECTION, SOLUTION INTRAVENOUS; SUBCUTANEOUS at 18:22

## 2023-11-30 NOTE — H&P
Diagnosis Date Noted    Type 2 diabetes mellitus with hyperglycemia, with long-term current use of insulin ([address]) [E11.65, Z79.4] 08/04/2022     Priority: Medium    ESRD on hemodialysis ([address]) [N18.6, Z99.2] 08/03/2022     Priority: Medium    Depression with anxiety [F41.8]      Priority: Medium    Cerebral hypoperfusion [I67.9]      Priority: Medium    NSTEMI (non-ST elevated myocardial infarction) ([address]) [I21.4] 10/30/2023    Anxiety [F41.9] 09/30/2021    Obesity, Class II, BMI 35-39.9 [E66.9] 04/07/2021         ASSESSMENT/PLAN:  Patient admitted with Chest pain r/o ACS. Co-morbidities as above.    -Cardiology consulted - recent stents x 2 placed, troponin minimally elevated, chest pain musculoskeletal vs cardiac. -HTN - stable. -DM2 - elevated BS's, on Lantus 62 units BID and SS coverage.  -Home medications reviewed & reconciled.  -Complete orders per chart. DVT Prophylaxis:   Diet: ADULT DIET; Regular; 4 carb choices (60 gm/meal);  No Caffeine  Code Status: Full Code      Dispo - admitted to [facility]       @[facility]@
61 hx of cva, R carotid artery occlusion presents with dizziness. patient notes he was at the store getting a new phone when he had an episode of light-headedness; he was worked up for CVA in the ED and CDU and had negative MRI, but he was planned for nuc after cardiology eval and decided he does not want to stay for it. Pt reports he is feeling well, can f/u with Dr Iqbal as an outpt. Spoke with Dr Iqbal, pt will sign out AMA (voiced understanding of risk of leaving without full evaluation, including worsening dz and even death) and f/u with Dr Iqbal as an outpt. He was advised to return to the ED immediately for recurrent sx.

## 2023-11-30 NOTE — DISCHARGE INSTR - COC
Recombinant (Shingrix) 09/23/2022, 01/10/2023       Active Problems:  Patient Active Problem List   Diagnosis Code    Atherosclerosis of coronary artery bypass graft of native heart with angina pectoris ([facility]) I25.709    Acute kidney injury superimposed on CKD ([address]) N17.9, N18.9    Acute on chronic diastolic heart failure ([facility]) I50.33    Diabetic polyneuropathy associated with type 2 diabetes mellitus ([facility]) E11.42    History of coronary artery bypass graft Z95.1    Iron deficiency anemia D50.9    Spinal stenosis of lumbar region with neurogenic claudication M48.062    Mixed hyperlipidemia E78.2    CKD (chronic kidney disease) stage 4, GFR 15-29 ml/min ([facility]) N18.4    Type 2 diabetes 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, with long-term current use of insulin ([facility]) E11.22, N18.6, Z99.2, Z79.4    Obesity, Class II, BMI 35-39.9 E66.9    Thyroid nodule greater than or equal to 1 cm in diameter incidentally noted on imaging study E04.1    Primary hypertension I10    Anemia of chronic disease D63.8    Chronic ischemic heart disease I25.9    Ischemic stroke of frontal lobe ([facility]) I63.9    Morbid obesity with BMI of 40.0-44.9, adult ([facility]) E66.01, Z68.41    Disequilibrium syndrome E87.8    Anxiety F41.9    Chronic midline low back pain with bilateral sciatica M54.41, M54.42, G89.29    Acute thoracic back pain M54.6    COVID U07.1    Delirium due to another medical condition F05    Cerebral hypoperfusion I67.9    Immature arteriovenous fistula ([facility]) I77.0    History of fusion of cervical spine Z98.1    Depression with anxiety F41.8    Vancomycin resistant Enterococcus UTI A49.1, Z16.21    ESRD on hemodialysis ([facility]) N18.6, Z99.2    Dialysis disequilibrium syndrome E87.8    Acute cystitis without hematuria N30.00    VRE infection (vancomycin resistant Enterococcus) A49.1, Z16.21    Infection due to ESBL-producing Klebsiella pneumoniae A49.8, Z16.12    Class 2 severe obesity due to excess calories with serious

## 2023-11-30 NOTE — PLAN OF CARE
Problem: Safety - Adult  Goal: Free from fall injury  Outcome: Progressing  Note: No falls noted this shift. Patient ambulates with x1 staff assistance without difficulty. Bed kept in low position. Safe environment maintained. Bedside table & call light in reach. Uses call light appropriately when needing assistance. Problem: Chronic Conditions and Co-morbidities  Goal: Patient's chronic conditions and co-morbidity symptoms are monitored and maintained or improved  Outcome: Progressing  Note: Patient recently had stents placed at [facility]. Vs     Problem: Pain  Goal: Verbalizes/displays adequate comfort level or baseline comfort level  Outcome: Progressing  Note: Pt medicated with pain medication prn. Assessed all pain characteristics including level, type, location, frequency, and onset. Non-pharmacologic interventions offered to pt as well. Pt states pain is tolerable at this time.        Problem: Discharge Planning  Goal: Discharge to home or other facility with appropriate resources  Outcome: Progressing

## 2023-11-30 NOTE — ACP (ADVANCE CARE PLANNING)
Advance Care Planning     Advance Care Planning Activator (Inpatient)  Conversation Note      Date of ACP Conversation: 11/30/2023     Conversation Conducted with: Patient with Decision Making Capacity    ACP Activator: Angelita Macias RN        Health Care Decision Maker:     Current Designated Health Care Decision Maker:     Primary Decision Maker: Elin Whitten - Brother/Sister - [phone]    Secondary Decision Maker: Tano Ignacio - Brother/Sister - [phone]        Care Preferences    Ventilation: \"If you were in your present state of health and suddenly became very ill and were unable to breathe on your own, what would your preference be about the use of a ventilator (breathing machine) if it were available to you? \"      Would the patient desire the use of ventilator (breathing machine)?: yes    \"If your health worsens and it becomes clear that your chance of recovery is unlikely, what would your preference be about the use of a ventilator (breathing machine) if it were available to you? \"     Would the patient desire the use of ventilator (breathing machine)?: No      Resuscitation  \"CPR works best to restart the heart when there is a sudden event, like a heart attack, in someone who is otherwise healthy. Unfortunately, CPR does not typically restart the heart for people who have serious health conditions or who are very sick. \"    \"In the event your heart stopped as a result of an underlying serious health condition, would you want attempts to be made to restart your heart (answer \"yes\" for attempt to resuscitate) or would you prefer a natural death (answer \"no\" for do not attempt to resuscitate)? \" yes       [] Yes   [] No   Educated Patient / Emily Verma regarding differences between Advance Directives and portable DNR orders.     Length of ACP Conversation in minutes:      Conversation Outcomes:  ACP discussion completed    Follow-up plan:    [] Schedule follow-up conversation to continue

## 2023-12-01 ENCOUNTER — APPOINTMENT (OUTPATIENT)
Dept: CT IMAGING | Age: 63
DRG: 313 | End: 2023-12-01
Payer: COMMERCIAL

## 2023-12-01 LAB
ERYTHROCYTE [DISTWIDTH] IN BLOOD BY AUTOMATED COUNT: 17 % (ref 11.5–14.9)
GLUCOSE BLD-MCNC: 212 MG/DL (ref 65–105)
GLUCOSE BLD-MCNC: 218 MG/DL (ref 65–105)
GLUCOSE BLD-MCNC: 352 MG/DL (ref 65–105)
GLUCOSE BLD-MCNC: 375 MG/DL (ref 65–105)
GLUCOSE BLD-MCNC: 392 MG/DL (ref 65–105)
GLUCOSE BLD-MCNC: 411 MG/DL (ref 65–105)
HCT VFR BLD AUTO: 26.2 % (ref 36–46)
HGB BLD-MCNC: 8.9 G/DL (ref 12–16)
MCH RBC QN AUTO: 36.7 PG (ref 26–34)
MCHC RBC AUTO-ENTMCNC: 33.8 G/DL (ref 31–37)
MCV RBC AUTO: 108.7 FL (ref 80–100)
PATH REV BLD -IMP: NORMAL
PHOSPHATE SERPL-MCNC: 6.3 MG/DL (ref 2.6–4.5)
PLATELET # BLD AUTO: 149 K/UL (ref 150–450)
PMV BLD AUTO: 8.2 FL (ref 6–12)
RBC # BLD AUTO: 2.41 M/UL (ref 4–5.2)
SURGICAL PATHOLOGY REPORT: NORMAL
WBC OTHER # BLD: 5.6 K/UL (ref 3.5–11)

## 2023-12-01 PROCEDURE — 4A03X5D MEASUREMENT OF ARTERIAL FLOW, INTRACRANIAL, EXTERNAL APPROACH: ICD-10-PCS | Performed by: FAMILY MEDICINE

## 2023-12-01 PROCEDURE — 72125 CT NECK SPINE W/O DYE: CPT

## 2023-12-01 PROCEDURE — 99239 HOSP IP/OBS DSCHRG MGMT >30: CPT | Performed by: FAMILY MEDICINE

## 2023-12-01 PROCEDURE — 2060000000 HC ICU INTERMEDIATE R&B

## 2023-12-01 PROCEDURE — 85027 COMPLETE CBC AUTOMATED: CPT

## 2023-12-01 PROCEDURE — 36415 COLL VENOUS BLD VENIPUNCTURE: CPT

## 2023-12-01 PROCEDURE — 6360000002 HC RX W HCPCS: Performed by: FAMILY MEDICINE

## 2023-12-01 PROCEDURE — 70450 CT HEAD/BRAIN W/O DYE: CPT

## 2023-12-01 PROCEDURE — 82947 ASSAY GLUCOSE BLOOD QUANT: CPT

## 2023-12-01 PROCEDURE — 2W32XYZ IMMOBILIZATION OF NECK USING OTHER DEVICE: ICD-10-PCS | Performed by: FAMILY MEDICINE

## 2023-12-01 PROCEDURE — 99233 SBSQ HOSP IP/OBS HIGH 50: CPT | Performed by: INTERNAL MEDICINE

## 2023-12-01 PROCEDURE — 99222 1ST HOSP IP/OBS MODERATE 55: CPT | Performed by: PSYCHIATRY & NEUROLOGY

## 2023-12-01 PROCEDURE — 70498 CT ANGIOGRAPHY NECK: CPT

## 2023-12-01 PROCEDURE — 90935 HEMODIALYSIS ONE EVALUATION: CPT

## 2023-12-01 PROCEDURE — 5A1D70Z PERFORMANCE OF URINARY FILTRATION, INTERMITTENT, LESS THAN 6 HOURS PER DAY: ICD-10-PCS | Performed by: INTERNAL MEDICINE

## 2023-12-01 PROCEDURE — 84100 ASSAY OF PHOSPHORUS: CPT

## 2023-12-01 PROCEDURE — 6360000004 HC RX CONTRAST MEDICATION: Performed by: FAMILY MEDICINE

## 2023-12-01 PROCEDURE — 2580000003 HC RX 258: Performed by: FAMILY MEDICINE

## 2023-12-01 PROCEDURE — 6370000000 HC RX 637 (ALT 250 FOR IP): Performed by: FAMILY MEDICINE

## 2023-12-01 RX ORDER — INSULIN GLARGINE 100 [IU]/ML
INJECTION, SOLUTION SUBCUTANEOUS
Qty: 15 ML | Refills: 2 | Status: SHIPPED | OUTPATIENT
Start: 2023-12-01

## 2023-12-01 RX ORDER — INSULIN GLARGINE 100 [IU]/ML
72 INJECTION, SOLUTION SUBCUTANEOUS 2 TIMES DAILY
Qty: 15 ML | Refills: 2 | Status: SHIPPED | OUTPATIENT
Start: 2023-12-01 | End: 2023-12-01 | Stop reason: SDUPTHER

## 2023-12-01 RX ORDER — INSULIN LISPRO 100 [IU]/ML
20 INJECTION, SOLUTION INTRAVENOUS; SUBCUTANEOUS ONCE
Status: COMPLETED | OUTPATIENT
Start: 2023-12-01 | End: 2023-12-01

## 2023-12-01 RX ORDER — LIDOCAINE 4 G/G
1 PATCH TOPICAL DAILY
Qty: 30 PATCH | Refills: 1 | Status: SHIPPED | OUTPATIENT
Start: 2023-12-01

## 2023-12-01 RX ORDER — LIDOCAINE 4 G/G
1 PATCH TOPICAL DAILY
Status: DISCONTINUED | OUTPATIENT
Start: 2023-12-01 | End: 2023-12-06 | Stop reason: HOSPADM

## 2023-12-01 RX ORDER — SODIUM CHLORIDE 0.9 % (FLUSH) 0.9 %
10 SYRINGE (ML) INJECTION PRN
Status: DISCONTINUED | OUTPATIENT
Start: 2023-12-01 | End: 2023-12-06 | Stop reason: HOSPADM

## 2023-12-01 RX ORDER — 0.9 % SODIUM CHLORIDE 0.9 %
100 INTRAVENOUS SOLUTION INTRAVENOUS ONCE
Status: COMPLETED | OUTPATIENT
Start: 2023-12-01 | End: 2023-12-01

## 2023-12-01 RX ADMIN — ACETAMINOPHEN 650 MG: 325 TABLET ORAL at 21:54

## 2023-12-01 RX ADMIN — IOPAMIDOL 75 ML: 755 INJECTION, SOLUTION INTRAVENOUS at 15:24

## 2023-12-01 RX ADMIN — EPOETIN ALFA-EPBX 3000 UNITS: 3000 INJECTION, SOLUTION INTRAVENOUS; SUBCUTANEOUS at 22:09

## 2023-12-01 RX ADMIN — CARVEDILOL 3.12 MG: 3.12 TABLET, FILM COATED ORAL at 09:38

## 2023-12-01 RX ADMIN — GABAPENTIN 300 MG: 300 CAPSULE ORAL at 21:53

## 2023-12-01 RX ADMIN — INSULIN GLARGINE 62 UNITS: 100 INJECTION, SOLUTION SUBCUTANEOUS at 10:51

## 2023-12-01 RX ADMIN — Medication 3 MG: at 22:05

## 2023-12-01 RX ADMIN — GABAPENTIN 300 MG: 300 CAPSULE ORAL at 09:37

## 2023-12-01 RX ADMIN — VENLAFAXINE HYDROCHLORIDE 75 MG: 75 CAPSULE, EXTENDED RELEASE ORAL at 09:38

## 2023-12-01 RX ADMIN — SODIUM CHLORIDE 100 ML: 9 INJECTION, SOLUTION INTRAVENOUS at 15:25

## 2023-12-01 RX ADMIN — INSULIN LISPRO 20 UNITS: 100 INJECTION, SOLUTION INTRAVENOUS; SUBCUTANEOUS at 13:51

## 2023-12-01 RX ADMIN — ASPIRIN 81 MG: 81 TABLET, CHEWABLE ORAL at 09:38

## 2023-12-01 RX ADMIN — CYCLOBENZAPRINE 10 MG: 10 TABLET, FILM COATED ORAL at 21:53

## 2023-12-01 RX ADMIN — SODIUM CHLORIDE, PRESERVATIVE FREE 10 ML: 5 INJECTION INTRAVENOUS at 21:56

## 2023-12-01 RX ADMIN — BUSPIRONE HYDROCHLORIDE 10 MG: 10 TABLET ORAL at 09:38

## 2023-12-01 RX ADMIN — SODIUM BICARBONATE 1300 MG: 650 TABLET ORAL at 15:43

## 2023-12-01 RX ADMIN — BUSPIRONE HYDROCHLORIDE 10 MG: 10 TABLET ORAL at 21:53

## 2023-12-01 RX ADMIN — INSULIN LISPRO 4 UNITS: 100 INJECTION, SOLUTION INTRAVENOUS; SUBCUTANEOUS at 09:38

## 2023-12-01 RX ADMIN — POTASSIUM CHLORIDE 20 MEQ: 1.5 FOR SOLUTION ORAL at 10:50

## 2023-12-01 RX ADMIN — INSULIN LISPRO 16 UNITS: 100 INJECTION, SOLUTION INTRAVENOUS; SUBCUTANEOUS at 11:41

## 2023-12-01 RX ADMIN — BUMETANIDE 3 MG: 1 TABLET ORAL at 21:52

## 2023-12-01 RX ADMIN — BUSPIRONE HYDROCHLORIDE 10 MG: 10 TABLET ORAL at 15:43

## 2023-12-01 RX ADMIN — SODIUM CHLORIDE, PRESERVATIVE FREE 10 ML: 5 INJECTION INTRAVENOUS at 09:40

## 2023-12-01 RX ADMIN — PRASUGREL 10 MG: 10 TABLET, FILM COATED ORAL at 10:51

## 2023-12-01 RX ADMIN — ALLOPURINOL 100 MG: 100 TABLET ORAL at 09:37

## 2023-12-01 RX ADMIN — ATORVASTATIN CALCIUM 80 MG: 80 TABLET, FILM COATED ORAL at 21:52

## 2023-12-01 RX ADMIN — SODIUM BICARBONATE 1300 MG: 650 TABLET ORAL at 21:53

## 2023-12-01 RX ADMIN — DOCUSATE SODIUM 100 MG: 100 CAPSULE, LIQUID FILLED ORAL at 09:38

## 2023-12-01 RX ADMIN — INSULIN GLARGINE 62 UNITS: 100 INJECTION, SOLUTION SUBCUTANEOUS at 21:54

## 2023-12-01 RX ADMIN — ACETAMINOPHEN 650 MG: 325 TABLET ORAL at 15:43

## 2023-12-01 RX ADMIN — SODIUM CHLORIDE, PRESERVATIVE FREE 10 ML: 5 INJECTION INTRAVENOUS at 15:24

## 2023-12-01 RX ADMIN — BUMETANIDE 3 MG: 1 TABLET ORAL at 09:38

## 2023-12-01 RX ADMIN — SODIUM BICARBONATE 1300 MG: 650 TABLET ORAL at 09:38

## 2023-12-01 RX ADMIN — CARVEDILOL 3.12 MG: 3.12 TABLET, FILM COATED ORAL at 21:53

## 2023-12-01 ASSESSMENT — PAIN SCALES - GENERAL: PAINLEVEL_OUTOF10: 3

## 2023-12-01 ASSESSMENT — PAIN DESCRIPTION - LOCATION: LOCATION: GENERALIZED

## 2023-12-01 NOTE — VIRTUAL HEALTH
[facility] Stroke and Telestroke Consult for  [facility] Stroke Alert through [address] @ 12.28 pm  12/1/2023 4:31 PM    Pt Name: Gabi Brown  MRN: [id number]  YOB: 1960  Date of evaluation: 12/1/2023  Primary Care Physician: AFSANEH Orosco CNP  Reason for Evaluation: Stroke evaluation with Phone Consult, Discussion and Review of imaging    Gabi Brown is a 70 y.o. female with past medical history of incisional disease on dialysis who presented yesterday with chest pain while bending down on her chair to  a remote. This morning patient woke into the bathroom and when she got up she felt like her legs were weak and when she tried to put on her undergarments she fell. Patient recalls falling. Stroke code was activated. Writer spoke with The charge nurse @ 12.40 pmwho reported patient was confused in no hand weakness in both upper extremities and right lower extremity. The plan was to get a CT head, CTA and CT C-spine. Writer on the video assessment was able to talk to the family. Sisters at bedside reported patient having around 10 total episodes of confusion and right-sided weakness. Extensive workup was done with no etiology and the conclusion was that this was psychological/nonorganic and patient was referred for psychological assessment which patient refused. Per the sisters restarted after series of life stressors. She improves all the time with therapy. LKW: 12.58  NIH:  14    Allergies  is allergic to adhesive tape, imdur [isosorbide nitrate], isosorbide dinitrate, ranexa [ranolazine], metformin and related, ace inhibitors, iv dye [iodides], nsaids, metformin and related, and silicone. Medications  Prior to Admission medications    Medication Sig Start Date End Date Taking?  Authorizing Provider   lidocaine 4 % external patch Place 1 patch onto the skin daily 12/1/23  Yes Chiara Cadr MD   insulin glargine [facility]) 100

## 2023-12-01 NOTE — DIALYSIS
HEMODIALYSIS PRE-TREATMENT NOTE    Patient Identifiers prior to treatment: name  mrn    Isolation Required: esbl  mdro, vre, mrsa                      Isolation Type: contact       (please document if patient is being managed as a PUI/COVID-19 patient)        Hepatitis status: immune                          Date Drawn                             Result  Hepatitis B Surface Antigen 3.27.23     negative                     Hepatitis B Surface Antibody 3.27.23 > 1000        Hepatitis B Core Antibody 3.29.23 negative          How was Hepatitis Status verified: epic results     Was a copy of the labs you documented provided to facility for the patient's chart: yes    Hemodialysis orders verified: yes    Access Within normal limits ( I.e. s/s of infection,...): fistula to left upper arm wnl     Pre-Assessment completed: yes    Pre-dialysis report received from: Mic Melchor RN                      Time: [id number]

## 2023-12-01 NOTE — PLAN OF CARE
Problem: Safety - Adult  Goal: Free from fall injury  Outcome: Progressing  Note: No falls noted this shift. Patient ambulates with x1 staff assistance without difficulty. Bed kept in low position. Safe environment maintained. Bedside table & call light in reach. Uses call light appropriately when needing assistance.        Problem: Chronic Conditions and Co-morbidities  Goal: Patient's chronic conditions and co-morbidity symptoms are monitored and maintained or improved  Outcome: Progressing     Problem: Pain  Goal: Verbalizes/displays adequate comfort level or baseline comfort level  Outcome: Progressing     Problem: Discharge Planning  Goal: Discharge to home or other facility with appropriate resources  Outcome: Progressing

## 2023-12-02 LAB
ANION GAP SERPL CALCULATED.3IONS-SCNC: 15 MMOL/L (ref 9–17)
BUN SERPL-MCNC: 34 MG/DL (ref 8–23)
CALCIUM SERPL-MCNC: 9.2 MG/DL (ref 8.6–10.4)
CHLORIDE SERPL-SCNC: 95 MMOL/L (ref 98–107)
CO2 SERPL-SCNC: 26 MMOL/L (ref 20–31)
CREAT SERPL-MCNC: 3.4 MG/DL (ref 0.5–0.9)
GFR SERPL CREATININE-BSD FRML MDRD: 14 ML/MIN/1.73M2
GLUCOSE BLD-MCNC: 358 MG/DL (ref 65–105)
GLUCOSE BLD-MCNC: 370 MG/DL (ref 65–105)
GLUCOSE BLD-MCNC: 376 MG/DL (ref 65–105)
GLUCOSE BLD-MCNC: 469 MG/DL (ref 65–105)
GLUCOSE SERPL-MCNC: 400 MG/DL (ref 70–99)
POTASSIUM SERPL-SCNC: 4.6 MMOL/L (ref 3.7–5.3)
SODIUM SERPL-SCNC: 136 MMOL/L (ref 135–144)

## 2023-12-02 PROCEDURE — 6370000000 HC RX 637 (ALT 250 FOR IP): Performed by: INTERNAL MEDICINE

## 2023-12-02 PROCEDURE — 6370000000 HC RX 637 (ALT 250 FOR IP): Performed by: FAMILY MEDICINE

## 2023-12-02 PROCEDURE — 36415 COLL VENOUS BLD VENIPUNCTURE: CPT

## 2023-12-02 PROCEDURE — 82947 ASSAY GLUCOSE BLOOD QUANT: CPT

## 2023-12-02 PROCEDURE — 80048 BASIC METABOLIC PNL TOTAL CA: CPT

## 2023-12-02 PROCEDURE — 2060000000 HC ICU INTERMEDIATE R&B

## 2023-12-02 PROCEDURE — 2580000003 HC RX 258: Performed by: FAMILY MEDICINE

## 2023-12-02 RX ORDER — SEVELAMER CARBONATE 800 MG/1
800 TABLET, FILM COATED ORAL
Status: DISCONTINUED | OUTPATIENT
Start: 2023-12-02 | End: 2023-12-06 | Stop reason: HOSPADM

## 2023-12-02 RX ORDER — INSULIN LISPRO 100 [IU]/ML
10 INJECTION, SOLUTION INTRAVENOUS; SUBCUTANEOUS
Status: DISCONTINUED | OUTPATIENT
Start: 2023-12-02 | End: 2023-12-04

## 2023-12-02 RX ADMIN — CARVEDILOL 3.12 MG: 3.12 TABLET, FILM COATED ORAL at 21:17

## 2023-12-02 RX ADMIN — BUSPIRONE HYDROCHLORIDE 10 MG: 10 TABLET ORAL at 15:01

## 2023-12-02 RX ADMIN — BUMETANIDE 3 MG: 1 TABLET ORAL at 21:17

## 2023-12-02 RX ADMIN — BUSPIRONE HYDROCHLORIDE 10 MG: 10 TABLET ORAL at 21:17

## 2023-12-02 RX ADMIN — INSULIN GLARGINE 62 UNITS: 100 INJECTION, SOLUTION SUBCUTANEOUS at 10:25

## 2023-12-02 RX ADMIN — INSULIN LISPRO 16 UNITS: 100 INJECTION, SOLUTION INTRAVENOUS; SUBCUTANEOUS at 12:52

## 2023-12-02 RX ADMIN — BUSPIRONE HYDROCHLORIDE 10 MG: 10 TABLET ORAL at 10:26

## 2023-12-02 RX ADMIN — INSULIN LISPRO 16 UNITS: 100 INJECTION, SOLUTION INTRAVENOUS; SUBCUTANEOUS at 10:25

## 2023-12-02 RX ADMIN — BUMETANIDE 3 MG: 1 TABLET ORAL at 10:26

## 2023-12-02 RX ADMIN — ATORVASTATIN CALCIUM 80 MG: 80 TABLET, FILM COATED ORAL at 21:17

## 2023-12-02 RX ADMIN — ALLOPURINOL 100 MG: 100 TABLET ORAL at 10:26

## 2023-12-02 RX ADMIN — GABAPENTIN 300 MG: 300 CAPSULE ORAL at 21:17

## 2023-12-02 RX ADMIN — INSULIN GLARGINE 62 UNITS: 100 INJECTION, SOLUTION SUBCUTANEOUS at 21:17

## 2023-12-02 RX ADMIN — SODIUM BICARBONATE 1300 MG: 650 TABLET ORAL at 15:01

## 2023-12-02 RX ADMIN — DOCUSATE SODIUM 100 MG: 100 CAPSULE, LIQUID FILLED ORAL at 10:26

## 2023-12-02 RX ADMIN — SODIUM CHLORIDE, PRESERVATIVE FREE 10 ML: 5 INJECTION INTRAVENOUS at 12:47

## 2023-12-02 RX ADMIN — PRASUGREL 10 MG: 10 TABLET, FILM COATED ORAL at 12:46

## 2023-12-02 RX ADMIN — INSULIN LISPRO 12 UNITS: 100 INJECTION, SOLUTION INTRAVENOUS; SUBCUTANEOUS at 17:34

## 2023-12-02 RX ADMIN — SODIUM CHLORIDE, PRESERVATIVE FREE 10 ML: 5 INJECTION INTRAVENOUS at 21:18

## 2023-12-02 RX ADMIN — INSULIN LISPRO 10 UNITS: 100 INJECTION, SOLUTION INTRAVENOUS; SUBCUTANEOUS at 17:33

## 2023-12-02 RX ADMIN — CARVEDILOL 3.12 MG: 3.12 TABLET, FILM COATED ORAL at 10:26

## 2023-12-02 RX ADMIN — INSULIN LISPRO 4 UNITS: 100 INJECTION, SOLUTION INTRAVENOUS; SUBCUTANEOUS at 21:27

## 2023-12-02 RX ADMIN — VENLAFAXINE HYDROCHLORIDE 75 MG: 75 CAPSULE, EXTENDED RELEASE ORAL at 12:47

## 2023-12-02 RX ADMIN — POTASSIUM CHLORIDE 20 MEQ: 1.5 FOR SOLUTION ORAL at 12:46

## 2023-12-02 RX ADMIN — DOCUSATE SODIUM 100 MG: 100 CAPSULE, LIQUID FILLED ORAL at 21:17

## 2023-12-02 RX ADMIN — GABAPENTIN 300 MG: 300 CAPSULE ORAL at 10:26

## 2023-12-02 RX ADMIN — SODIUM BICARBONATE 1300 MG: 650 TABLET ORAL at 21:17

## 2023-12-02 RX ADMIN — SODIUM BICARBONATE 1300 MG: 650 TABLET ORAL at 10:26

## 2023-12-02 RX ADMIN — ASPIRIN 81 MG: 81 TABLET, CHEWABLE ORAL at 10:25

## 2023-12-02 RX ADMIN — SEVELAMER CARBONATE 800 MG: 800 TABLET, FILM COATED ORAL at 17:33

## 2023-12-02 ASSESSMENT — PAIN SCALES - GENERAL: PAINLEVEL_OUTOF10: 6

## 2023-12-02 ASSESSMENT — PAIN DESCRIPTION - ORIENTATION: ORIENTATION: RIGHT

## 2023-12-02 ASSESSMENT — PAIN DESCRIPTION - DESCRIPTORS: DESCRIPTORS: SHARP

## 2023-12-02 ASSESSMENT — PAIN DESCRIPTION - LOCATION: LOCATION: LEG

## 2023-12-02 NOTE — PLAN OF CARE
Problem: Pain  Goal: Verbalizes/displays adequate comfort level or baseline comfort level  Outcome: Progressing     Problem: Safety - Adult  Goal: Free from fall injury  Outcome: Progressing     Problem: ABCDS Injury Assessment  Goal: Absence of physical injury  Outcome: Progressing  Flowsheets (Taken 12/2/2023 0915 by Kirsten Cardozo RN)  Absence of Physical Injury: Implement safety measures based on patient assessment     Problem: Chronic Conditions and Co-morbidities  Goal: Patient's chronic conditions and co-morbidity symptoms are monitored and maintained or improved  Outcome: Progressing

## 2023-12-02 NOTE — DIALYSIS
HEMODIALYSIS POST TREATMENT NOTE    Treatment time ordered: 3 hours    Actual treatment time: 3 hours    UltraFiltration Goal: 2L  UltraFiltration Removed: 2L      Pre Treatment weight: 105.8 kg  Post Treatment weight: 103.8 kg  Estimated Dry Weight: 102.5 kg    Access used: Internal Access:       AV Fistula or AV Graft: fistula         Site: left upper arm       Access Flow: great       Sign and symptoms of infection: wnl       If YES:     Medications or blood products given: none    Chronic outpatient schedule: [facility]    Chronic outpatient unit: [facility]    Summary of response to treatment: Treatment tolerated well. Net fluid removal 2L over 3 hours. Explain if orders NOT met, was physician notified:n/a      ACES flowsheet faxed to patient unit/ placed in patient chart: yes    Post assessment completed: yes    Report given to: [facility] documented Safety Checks include the followin) Access and face visible at all times. 2) All connections and blood lines are secure with no kinks. 3) NVL alarm engaged. 4) Hemosafe device applied (if applicable). 5) No collapse of Arterial or Venous blood chambers. 6) All blood lines / pump segments in the air detectors.

## 2023-12-02 NOTE — CONSULTS
Attempted to return page for reason for consult five separate times without answer. No reason given in [employer]. Upon chart review, consulting reason is for C-spine clearance. I do not perform spine surgery. Will need to consult orthopedic spine surgery, or neurosurgery for clearance.     Maximiano Halsted, DO  10:11 AM 12/2/2023
General Surgery   Consultation        Pt Name: Alysa Miller  MRN: [id number]  YOB: 1960  Date of evaluation: 12/1/2023  Primary Care Physician: Sheilah Snellen, APRN - CNP   Patient evaluated at the request of  Dr. Salima Santizo  Reason for evaluation: C-Spine clearance    SUBJECTIVE:   History of Chief Complaint:    Alysa Miller is a 70 y.o. female who presents with chest pain. Today she had strokelike symptoms and rapid response stroke alert was called. Patient had investigations by the neurologist.  One of them was C-spine evaluation. Patient with history of anterior fusion of the C-spine. Was complaining of neck pain when she was seen in dialysis after her imaging. Patient is complaining of some weakness on the right upper extremity as well. Patient is awake alert in no acute distress. CT of the head did not show any bleed. CT of the C-spine shows no acute injury. Evidence of anterior fusion along with degenerative disease. C-collar in place. Past Medical History   has a past medical history of Arthritis, Backache, unspecified, CAD (coronary artery disease), Cerebral artery occlusion with cerebral infarction ([address]), CHF (congestive heart failure) ([address]), Chronic kidney disease, Coronary atherosclerosis of artery bypass graft, COVID, Cramp of limb, Gallstones, Hemodialysis patient ([address]), Hyperlipidemia, Hypertension, Insomnia, Neuromuscular disorder ([address]), Pneumonia, Psychiatric problem, Thyroid disease, Type II or unspecified type diabetes mellitus with renal manifestations, not stated as uncontrolled(250.40), Type II or unspecified type diabetes mellitus without mention of complication, not stated as uncontrolled, and Unspecified vitamin D deficiency. Past Surgical History   has a past surgical history that includes Coronary artery bypass graft (02/2005); Knee arthroscopy; Carpal tunnel release; Breast surgery; Tonsillectomy; Hand surgery; Ankle fracture surgery;  Cholecystectomy, open
Component Value Date/Time    JASON 47 11/14/2021 02:04 AM     Urine Chloride:    Lab Results   Component Value Date/Time    CLUR 26 11/14/2021 02:04 AM     Urine Protein:     Lab Results   Component Value Date/Time    TPU 20 11/12/2021 10:30 AM     Urine Creatinine:     Lab Results   Component Value Date/Time    LABCREA 65.4 01/26/2023 01:10 PM     IMPRESSION/RECOMMENDATIONS:      1.  End-stage renal disease - we will schedule patient for acute hemodialysis today for 3 hours. Renal diet,i.e 2-gram sodium,2-gram potassium,1500 ml fluid restriction,1-gram phosphorus, 1800 KCal and 1.2 gram/kg protein per day. 2.  Chest pain - atypical and likely musculoskeletal in etiology. Will follow cardiology recommendations. 3.  Coronary artery disease - s/p cardiac catheterization at [facility] on 11/2/2023 with finding of 99% mid stenosis of the saphenous vein graft to the diagonal coronary artery which improved with balloon angioplasty and stenting. 4.  Systemic hypertension - blood pressure control is adequate. 5.  Mineral bone disease profile - check serum phosphorus level. Prognosis is guarded. Thank you very much for the courtesy and confidence of this consultation.     Anny Avalos MD FACP  Attending Nephrologist  12/1/2023 9:34 AM
times daily 8/4/23 8/3/24  Bebeto Zarco MD   carvedilol (COREG) 3.125 MG tablet Take 1 tablet by mouth 2 times daily Hold for SBP < 120 7/27/23   Rustam Katz DO   midodrine (PROAMATINE) 5 MG tablet Take 1 tablet by mouth as needed (up to 2 tablets as needed for SBP <100 during dialysis)    Danay Sun MD   Continuous Blood Gluc Sensor (DEXCOM G7 SENSOR) MISC 1 each by Does not apply route every 14 days 5/25/23   AFSANEH Bee CNP   Continuous Blood Gluc Transmit (DEXCOM G6 TRANSMITTER) MISC 1 each by Does not apply route in the morning, at noon, in the evening, and at bedtime 5/25/23   Maegan Payne APRN - CNP   sodium chloride (OCEAN, BABY AYR) 0.65 % nasal spray 1 spray by Nasal route every 4 hours as needed for Congestion 3/6/23   Osvaldo Goff MD   Melatonin 10 MG TABS Take 10 mg by mouth nightly as needed (for sleep) Indications: Trouble Sleeping    ProviderDanay MD   allopurinol (ZYLOPRIM) 100 MG tablet Take 1 tablet by mouth daily 12/8/22   Guerda Yeung MD   Blood Glucose Monitoring Suppl (TRUE METRIX GO GLUCOSE METER) w/Device KIT 1 each by Does not apply route 4 times daily 9/19/22   AFSANEH Bee CNP   blood glucose monitor strips Test 3 times a day & as needed for symptoms of irregular blood glucose. Dispense sufficient amount for indicated testing frequency plus additional to accommodate PRN testing needs.  9/19/22   AFSANEH Bee CNP   Calcium Polycarbophil (FIBER-CAPS PO) Take 2 capsules by mouth in the morning and at bedtime    Danay Sun MD   ReliOn Lancets Micro-Thin 33G MISC USE AS DIRECTED EVERY DAY 1/28/22   Danay Sun MD   Lancets MISC 1 each by Does not apply route daily 11/30/21   AFSANEH Bee CNP       Current Medications: Scheduled Meds:   allopurinol  100 mg Oral Daily    carvedilol  3.125 mg Oral BID    sodium bicarbonate  1,300 mg Oral TID    docusate sodium  100 mg Oral BID    aspirin  81 mg Oral Daily

## 2023-12-03 LAB
ERYTHROCYTE [DISTWIDTH] IN BLOOD BY AUTOMATED COUNT: 16.8 % (ref 11.5–14.9)
GLUCOSE BLD-MCNC: 227 MG/DL (ref 65–105)
GLUCOSE BLD-MCNC: 383 MG/DL (ref 65–105)
GLUCOSE BLD-MCNC: 392 MG/DL (ref 65–105)
GLUCOSE BLD-MCNC: 395 MG/DL (ref 65–105)
HCT VFR BLD AUTO: 26.5 % (ref 36–46)
HGB BLD-MCNC: 8.8 G/DL (ref 12–16)
MCH RBC QN AUTO: 36.3 PG (ref 26–34)
MCHC RBC AUTO-ENTMCNC: 33.3 G/DL (ref 31–37)
MCV RBC AUTO: 108.9 FL (ref 80–100)
PLATELET # BLD AUTO: 137 K/UL (ref 150–450)
PMV BLD AUTO: 8.4 FL (ref 6–12)
RBC # BLD AUTO: 2.44 M/UL (ref 4–5.2)
WBC OTHER # BLD: 5.9 K/UL (ref 3.5–11)

## 2023-12-03 PROCEDURE — 97530 THERAPEUTIC ACTIVITIES: CPT

## 2023-12-03 PROCEDURE — 97166 OT EVAL MOD COMPLEX 45 MIN: CPT

## 2023-12-03 PROCEDURE — 6370000000 HC RX 637 (ALT 250 FOR IP): Performed by: FAMILY MEDICINE

## 2023-12-03 PROCEDURE — 97162 PT EVAL MOD COMPLEX 30 MIN: CPT

## 2023-12-03 PROCEDURE — 99232 SBSQ HOSP IP/OBS MODERATE 35: CPT | Performed by: FAMILY MEDICINE

## 2023-12-03 PROCEDURE — 82947 ASSAY GLUCOSE BLOOD QUANT: CPT

## 2023-12-03 PROCEDURE — 2060000000 HC ICU INTERMEDIATE R&B

## 2023-12-03 PROCEDURE — 36415 COLL VENOUS BLD VENIPUNCTURE: CPT

## 2023-12-03 PROCEDURE — 6370000000 HC RX 637 (ALT 250 FOR IP): Performed by: INTERNAL MEDICINE

## 2023-12-03 PROCEDURE — 2580000003 HC RX 258: Performed by: FAMILY MEDICINE

## 2023-12-03 PROCEDURE — 85027 COMPLETE CBC AUTOMATED: CPT

## 2023-12-03 RX ADMIN — INSULIN LISPRO 16 UNITS: 100 INJECTION, SOLUTION INTRAVENOUS; SUBCUTANEOUS at 12:20

## 2023-12-03 RX ADMIN — ATORVASTATIN CALCIUM 80 MG: 80 TABLET, FILM COATED ORAL at 21:30

## 2023-12-03 RX ADMIN — INSULIN LISPRO 10 UNITS: 100 INJECTION, SOLUTION INTRAVENOUS; SUBCUTANEOUS at 09:19

## 2023-12-03 RX ADMIN — DOCUSATE SODIUM 100 MG: 100 CAPSULE, LIQUID FILLED ORAL at 21:31

## 2023-12-03 RX ADMIN — INSULIN LISPRO 8 UNITS: 100 INJECTION, SOLUTION INTRAVENOUS; SUBCUTANEOUS at 09:20

## 2023-12-03 RX ADMIN — BUSPIRONE HYDROCHLORIDE 10 MG: 10 TABLET ORAL at 09:19

## 2023-12-03 RX ADMIN — ALLOPURINOL 100 MG: 100 TABLET ORAL at 09:19

## 2023-12-03 RX ADMIN — INSULIN LISPRO 4 UNITS: 100 INJECTION, SOLUTION INTRAVENOUS; SUBCUTANEOUS at 21:30

## 2023-12-03 RX ADMIN — POTASSIUM CHLORIDE 20 MEQ: 1.5 FOR SOLUTION ORAL at 11:05

## 2023-12-03 RX ADMIN — VENLAFAXINE HYDROCHLORIDE 75 MG: 75 CAPSULE, EXTENDED RELEASE ORAL at 09:20

## 2023-12-03 RX ADMIN — BUSPIRONE HYDROCHLORIDE 10 MG: 10 TABLET ORAL at 13:46

## 2023-12-03 RX ADMIN — SEVELAMER CARBONATE 800 MG: 800 TABLET, FILM COATED ORAL at 17:13

## 2023-12-03 RX ADMIN — GABAPENTIN 300 MG: 300 CAPSULE ORAL at 21:31

## 2023-12-03 RX ADMIN — GABAPENTIN 300 MG: 300 CAPSULE ORAL at 09:18

## 2023-12-03 RX ADMIN — INSULIN LISPRO 10 UNITS: 100 INJECTION, SOLUTION INTRAVENOUS; SUBCUTANEOUS at 12:21

## 2023-12-03 RX ADMIN — INSULIN GLARGINE 62 UNITS: 100 INJECTION, SOLUTION SUBCUTANEOUS at 21:29

## 2023-12-03 RX ADMIN — SODIUM CHLORIDE, PRESERVATIVE FREE 10 ML: 5 INJECTION INTRAVENOUS at 21:32

## 2023-12-03 RX ADMIN — BUMETANIDE 3 MG: 1 TABLET ORAL at 21:30

## 2023-12-03 RX ADMIN — SEVELAMER CARBONATE 800 MG: 800 TABLET, FILM COATED ORAL at 09:19

## 2023-12-03 RX ADMIN — CARVEDILOL 3.12 MG: 3.12 TABLET, FILM COATED ORAL at 21:30

## 2023-12-03 RX ADMIN — SEVELAMER CARBONATE 800 MG: 800 TABLET, FILM COATED ORAL at 12:20

## 2023-12-03 RX ADMIN — BUMETANIDE 3 MG: 1 TABLET ORAL at 09:19

## 2023-12-03 RX ADMIN — INSULIN LISPRO 16 UNITS: 100 INJECTION, SOLUTION INTRAVENOUS; SUBCUTANEOUS at 17:13

## 2023-12-03 RX ADMIN — SODIUM BICARBONATE 1300 MG: 650 TABLET ORAL at 13:46

## 2023-12-03 RX ADMIN — SODIUM CHLORIDE, PRESERVATIVE FREE 10 ML: 5 INJECTION INTRAVENOUS at 09:19

## 2023-12-03 RX ADMIN — INSULIN LISPRO 10 UNITS: 100 INJECTION, SOLUTION INTRAVENOUS; SUBCUTANEOUS at 17:14

## 2023-12-03 RX ADMIN — PRASUGREL 10 MG: 10 TABLET, FILM COATED ORAL at 11:05

## 2023-12-03 RX ADMIN — DOCUSATE SODIUM 100 MG: 100 CAPSULE, LIQUID FILLED ORAL at 09:19

## 2023-12-03 RX ADMIN — SODIUM BICARBONATE 1300 MG: 650 TABLET ORAL at 09:19

## 2023-12-03 RX ADMIN — ASPIRIN 81 MG: 81 TABLET, CHEWABLE ORAL at 09:19

## 2023-12-03 RX ADMIN — CARVEDILOL 3.12 MG: 3.12 TABLET, FILM COATED ORAL at 09:19

## 2023-12-03 RX ADMIN — SODIUM BICARBONATE 1300 MG: 650 TABLET ORAL at 21:31

## 2023-12-03 RX ADMIN — INSULIN GLARGINE 62 UNITS: 100 INJECTION, SOLUTION SUBCUTANEOUS at 09:19

## 2023-12-03 RX ADMIN — BUSPIRONE HYDROCHLORIDE 10 MG: 10 TABLET ORAL at 21:31

## 2023-12-03 NOTE — PLAN OF CARE
Problem: Discharge Planning  Goal: Discharge to home or other facility with appropriate resources  12/2/2023 2358 by Deborah Nieto RN  Outcome: Progressing  Flowsheets (Taken 12/2/2023 2000)  Discharge to home or other facility with appropriate resources:   Identify barriers to discharge with patient and caregiver   Arrange for needed discharge resources and transportation as appropriate   Identify discharge learning needs (meds, wound care, etc)     Problem: Pain  Goal: Verbalizes/displays adequate comfort level or baseline comfort level  12/2/2023 2358 by Deborah Nieto RN  Outcome: Progressing     Problem: Safety - Adult  Goal: Free from fall injury  12/2/2023 2358 by Deborah Nieto RN  Outcome: Progressing     Problem: ABCDS Injury Assessment  Goal: Absence of physical injury  12/2/2023 2358 by Deborah Nieto RN  Outcome: Progressing     Problem: Chronic Conditions and Co-morbidities  Goal: Patient's chronic conditions and co-morbidity symptoms are monitored and maintained or improved  12/2/2023 2358 by Deborah Nieto RN  Outcome: Progressing  Flowsheets (Taken 12/2/2023 2000)  Care Plan - Patient's Chronic Conditions and Co-Morbidity Symptoms are Monitored and Maintained or Improved: Monitor and assess patient's chronic conditions and comorbid symptoms for stability, deterioration, or improvement     Problem: Skin/Tissue Integrity  Goal: Absence of new skin breakdown  Description: 1. Monitor for areas of redness and/or skin breakdown  2. Assess vascular access sites hourly  3. Every 4-6 hours minimum:  Change oxygen saturation probe site  4. Every 4-6 hours:  If on nasal continuous positive airway pressure, respiratory therapy assess nares and determine need for appliance change or resting period.   12/2/2023 2358 by Deborah Nieto RN  Outcome: Progressing

## 2023-12-04 LAB
ANION GAP SERPL CALCULATED.3IONS-SCNC: 14 MMOL/L (ref 9–17)
BASOPHILS # BLD: 0.06 K/UL (ref 0–0.2)
BASOPHILS NFR BLD: 1 % (ref 0–2)
BUN SERPL-MCNC: 64 MG/DL (ref 8–23)
CALCIUM SERPL-MCNC: 9 MG/DL (ref 8.6–10.4)
CHLORIDE SERPL-SCNC: 98 MMOL/L (ref 98–107)
CO2 SERPL-SCNC: 25 MMOL/L (ref 20–31)
CREAT SERPL-MCNC: 5.3 MG/DL (ref 0.5–0.9)
EOSINOPHIL # BLD: 0.22 K/UL (ref 0–0.4)
EOSINOPHILS RELATIVE PERCENT: 4 % (ref 0–4)
ERYTHROCYTE [DISTWIDTH] IN BLOOD BY AUTOMATED COUNT: 17.1 % (ref 11.5–14.9)
GFR SERPL CREATININE-BSD FRML MDRD: 8 ML/MIN/1.73M2
GLUCOSE BLD-MCNC: 286 MG/DL (ref 65–105)
GLUCOSE BLD-MCNC: 304 MG/DL (ref 65–105)
GLUCOSE BLD-MCNC: 419 MG/DL (ref 65–105)
GLUCOSE BLD-MCNC: 422 MG/DL (ref 65–105)
GLUCOSE SERPL-MCNC: 333 MG/DL (ref 70–99)
HCT VFR BLD AUTO: 27.2 % (ref 36–46)
HGB BLD-MCNC: 8.8 G/DL (ref 12–16)
LYMPHOCYTES NFR BLD: 0.95 K/UL (ref 1–4.8)
LYMPHOCYTES RELATIVE PERCENT: 17 % (ref 24–44)
MCH RBC QN AUTO: 36.2 PG (ref 26–34)
MCHC RBC AUTO-ENTMCNC: 32.4 G/DL (ref 31–37)
MCV RBC AUTO: 111.8 FL (ref 80–100)
MONOCYTES NFR BLD: 0.39 K/UL (ref 0.1–1.3)
MONOCYTES NFR BLD: 7 % (ref 1–7)
MORPHOLOGY: ABNORMAL
MORPHOLOGY: ABNORMAL
NEUTROPHILS NFR BLD: 71 % (ref 36–66)
NEUTS SEG NFR BLD: 3.98 K/UL (ref 1.3–9.1)
PLATELET # BLD AUTO: 137 K/UL (ref 150–450)
PMV BLD AUTO: 9.2 FL (ref 6–12)
POTASSIUM SERPL-SCNC: 4.4 MMOL/L (ref 3.7–5.3)
RBC # BLD AUTO: 2.44 M/UL (ref 4–5.2)
SODIUM SERPL-SCNC: 137 MMOL/L (ref 135–144)
WBC OTHER # BLD: 5.6 K/UL (ref 3.5–11)

## 2023-12-04 PROCEDURE — 85025 COMPLETE CBC W/AUTO DIFF WBC: CPT

## 2023-12-04 PROCEDURE — 82947 ASSAY GLUCOSE BLOOD QUANT: CPT

## 2023-12-04 PROCEDURE — 6370000000 HC RX 637 (ALT 250 FOR IP): Performed by: INTERNAL MEDICINE

## 2023-12-04 PROCEDURE — 80048 BASIC METABOLIC PNL TOTAL CA: CPT

## 2023-12-04 PROCEDURE — 36415 COLL VENOUS BLD VENIPUNCTURE: CPT

## 2023-12-04 PROCEDURE — 6370000000 HC RX 637 (ALT 250 FOR IP): Performed by: FAMILY MEDICINE

## 2023-12-04 PROCEDURE — 2060000000 HC ICU INTERMEDIATE R&B

## 2023-12-04 PROCEDURE — 90935 HEMODIALYSIS ONE EVALUATION: CPT

## 2023-12-04 PROCEDURE — 99232 SBSQ HOSP IP/OBS MODERATE 35: CPT | Performed by: FAMILY MEDICINE

## 2023-12-04 PROCEDURE — 6360000002 HC RX W HCPCS: Performed by: FAMILY MEDICINE

## 2023-12-04 PROCEDURE — 2580000003 HC RX 258: Performed by: FAMILY MEDICINE

## 2023-12-04 RX ORDER — INSULIN LISPRO 100 [IU]/ML
20 INJECTION, SOLUTION INTRAVENOUS; SUBCUTANEOUS
Status: DISCONTINUED | OUTPATIENT
Start: 2023-12-05 | End: 2023-12-06 | Stop reason: HOSPADM

## 2023-12-04 RX ORDER — INSULIN LISPRO 100 [IU]/ML
20 INJECTION, SOLUTION INTRAVENOUS; SUBCUTANEOUS ONCE
Status: COMPLETED | OUTPATIENT
Start: 2023-12-04 | End: 2023-12-04

## 2023-12-04 RX ADMIN — DOCUSATE SODIUM 100 MG: 100 CAPSULE, LIQUID FILLED ORAL at 10:06

## 2023-12-04 RX ADMIN — SODIUM CHLORIDE, PRESERVATIVE FREE 10 ML: 5 INJECTION INTRAVENOUS at 22:18

## 2023-12-04 RX ADMIN — SODIUM BICARBONATE 1300 MG: 650 TABLET ORAL at 15:37

## 2023-12-04 RX ADMIN — SODIUM CHLORIDE, PRESERVATIVE FREE 10 ML: 5 INJECTION INTRAVENOUS at 10:09

## 2023-12-04 RX ADMIN — INSULIN GLARGINE 62 UNITS: 100 INJECTION, SOLUTION SUBCUTANEOUS at 10:06

## 2023-12-04 RX ADMIN — EPOETIN ALFA-EPBX 3000 UNITS: 3000 INJECTION, SOLUTION INTRAVENOUS; SUBCUTANEOUS at 18:39

## 2023-12-04 RX ADMIN — INSULIN GLARGINE 62 UNITS: 100 INJECTION, SOLUTION SUBCUTANEOUS at 21:00

## 2023-12-04 RX ADMIN — INSULIN LISPRO 16 UNITS: 100 INJECTION, SOLUTION INTRAVENOUS; SUBCUTANEOUS at 18:01

## 2023-12-04 RX ADMIN — ATORVASTATIN CALCIUM 80 MG: 80 TABLET, FILM COATED ORAL at 22:19

## 2023-12-04 RX ADMIN — PRASUGREL 10 MG: 10 TABLET, FILM COATED ORAL at 15:36

## 2023-12-04 RX ADMIN — BUMETANIDE 3 MG: 1 TABLET ORAL at 10:06

## 2023-12-04 RX ADMIN — BUSPIRONE HYDROCHLORIDE 10 MG: 10 TABLET ORAL at 22:17

## 2023-12-04 RX ADMIN — INSULIN LISPRO 10 UNITS: 100 INJECTION, SOLUTION INTRAVENOUS; SUBCUTANEOUS at 18:01

## 2023-12-04 RX ADMIN — INSULIN LISPRO 10 UNITS: 100 INJECTION, SOLUTION INTRAVENOUS; SUBCUTANEOUS at 10:09

## 2023-12-04 RX ADMIN — INSULIN LISPRO 20 UNITS: 100 INJECTION, SOLUTION INTRAVENOUS; SUBCUTANEOUS at 22:17

## 2023-12-04 RX ADMIN — GABAPENTIN 300 MG: 300 CAPSULE ORAL at 21:00

## 2023-12-04 RX ADMIN — ALLOPURINOL 100 MG: 100 TABLET ORAL at 10:05

## 2023-12-04 RX ADMIN — SEVELAMER CARBONATE 800 MG: 800 TABLET, FILM COATED ORAL at 18:01

## 2023-12-04 RX ADMIN — DOCUSATE SODIUM 100 MG: 100 CAPSULE, LIQUID FILLED ORAL at 22:16

## 2023-12-04 RX ADMIN — INSULIN LISPRO 16 UNITS: 100 INJECTION, SOLUTION INTRAVENOUS; SUBCUTANEOUS at 15:36

## 2023-12-04 RX ADMIN — SEVELAMER CARBONATE 800 MG: 800 TABLET, FILM COATED ORAL at 10:05

## 2023-12-04 RX ADMIN — INSULIN LISPRO 10 UNITS: 100 INJECTION, SOLUTION INTRAVENOUS; SUBCUTANEOUS at 15:36

## 2023-12-04 RX ADMIN — BUSPIRONE HYDROCHLORIDE 10 MG: 10 TABLET ORAL at 10:06

## 2023-12-04 RX ADMIN — CARVEDILOL 3.12 MG: 3.12 TABLET, FILM COATED ORAL at 10:05

## 2023-12-04 RX ADMIN — VENLAFAXINE HYDROCHLORIDE 75 MG: 75 CAPSULE, EXTENDED RELEASE ORAL at 10:05

## 2023-12-04 RX ADMIN — CARVEDILOL 3.12 MG: 3.12 TABLET, FILM COATED ORAL at 22:16

## 2023-12-04 RX ADMIN — GABAPENTIN 300 MG: 300 CAPSULE ORAL at 10:06

## 2023-12-04 RX ADMIN — INSULIN LISPRO 8 UNITS: 100 INJECTION, SOLUTION INTRAVENOUS; SUBCUTANEOUS at 10:07

## 2023-12-04 RX ADMIN — SEVELAMER CARBONATE 800 MG: 800 TABLET, FILM COATED ORAL at 15:37

## 2023-12-04 RX ADMIN — SODIUM BICARBONATE 1300 MG: 650 TABLET ORAL at 10:06

## 2023-12-04 RX ADMIN — POTASSIUM CHLORIDE 20 MEQ: 1.5 FOR SOLUTION ORAL at 15:37

## 2023-12-04 RX ADMIN — SODIUM BICARBONATE 1300 MG: 650 TABLET ORAL at 22:17

## 2023-12-04 RX ADMIN — BUMETANIDE 3 MG: 1 TABLET ORAL at 22:16

## 2023-12-04 RX ADMIN — ASPIRIN 81 MG: 81 TABLET, CHEWABLE ORAL at 10:06

## 2023-12-04 RX ADMIN — BUSPIRONE HYDROCHLORIDE 10 MG: 10 TABLET ORAL at 15:37

## 2023-12-04 ASSESSMENT — ENCOUNTER SYMPTOMS
VOMITING: 0
NAUSEA: 0
ABDOMINAL PAIN: 0

## 2023-12-04 NOTE — PLAN OF CARE
Problem: Discharge Planning  Goal: Discharge to home or other facility with appropriate resources  Outcome: Progressing  Flowsheets (Taken 12/3/2023 1935)  Discharge to home or other facility with appropriate resources:   Identify barriers to discharge with patient and caregiver   Arrange for needed discharge resources and transportation as appropriate     Problem: Pain  Goal: Verbalizes/displays adequate comfort level or baseline comfort level  Outcome: Progressing     Problem: Safety - Adult  Goal: Free from fall injury  Outcome: Progressing     Problem: ABCDS Injury Assessment  Goal: Absence of physical injury  Outcome: Progressing     Problem: Chronic Conditions and Co-morbidities  Goal: Patient's chronic conditions and co-morbidity symptoms are monitored and maintained or improved  Outcome: Progressing  Flowsheets (Taken 12/3/2023 1935)  Care Plan - Patient's Chronic Conditions and Co-Morbidity Symptoms are Monitored and Maintained or Improved:   Monitor and assess patient's chronic conditions and comorbid symptoms for stability, deterioration, or improvement   Collaborate with multidisciplinary team to address chronic and comorbid conditions and prevent exacerbation or deterioration     Problem: Skin/Tissue Integrity  Goal: Absence of new skin breakdown  Description: 1. Monitor for areas of redness and/or skin breakdown  2. Assess vascular access sites hourly  3. Every 4-6 hours minimum:  Change oxygen saturation probe site  4. Every 4-6 hours:  If on nasal continuous positive airway pressure, respiratory therapy assess nares and determine need for appliance change or resting period.   Outcome: Progressing

## 2023-12-04 NOTE — PLAN OF CARE
Problem: Discharge Planning  Goal: Discharge to home or other facility with appropriate resources  Outcome: Progressing     Problem: Pain  Goal: Verbalizes/displays adequate comfort level or baseline comfort level  Outcome: Progressing     Problem: Safety - Adult  Goal: Free from fall injury  Outcome: Progressing     Problem: ABCDS Injury Assessment  Goal: Absence of physical injury  Outcome: Progressing  Flowsheets (Taken 12/4/2023 7715)  Absence of Physical Injury: Implement safety measures based on patient assessment

## 2023-12-05 LAB
ERYTHROCYTE [DISTWIDTH] IN BLOOD BY AUTOMATED COUNT: 16.4 % (ref 11.5–14.9)
GLUCOSE BLD-MCNC: 171 MG/DL (ref 65–105)
GLUCOSE BLD-MCNC: 338 MG/DL (ref 65–105)
GLUCOSE BLD-MCNC: 343 MG/DL (ref 65–105)
GLUCOSE BLD-MCNC: 346 MG/DL (ref 65–105)
HCT VFR BLD AUTO: 27.3 % (ref 36–46)
HGB BLD-MCNC: 8.9 G/DL (ref 12–16)
MCH RBC QN AUTO: 36.1 PG (ref 26–34)
MCHC RBC AUTO-ENTMCNC: 32.7 G/DL (ref 31–37)
MCV RBC AUTO: 110.4 FL (ref 80–100)
PLATELET # BLD AUTO: 150 K/UL (ref 150–450)
PMV BLD AUTO: 8.8 FL (ref 6–12)
RBC # BLD AUTO: 2.47 M/UL (ref 4–5.2)
WBC OTHER # BLD: 6.3 K/UL (ref 3.5–11)

## 2023-12-05 PROCEDURE — 2060000000 HC ICU INTERMEDIATE R&B

## 2023-12-05 PROCEDURE — 82947 ASSAY GLUCOSE BLOOD QUANT: CPT

## 2023-12-05 PROCEDURE — 2580000003 HC RX 258: Performed by: FAMILY MEDICINE

## 2023-12-05 PROCEDURE — 6370000000 HC RX 637 (ALT 250 FOR IP): Performed by: FAMILY MEDICINE

## 2023-12-05 PROCEDURE — 97116 GAIT TRAINING THERAPY: CPT

## 2023-12-05 PROCEDURE — 99232 SBSQ HOSP IP/OBS MODERATE 35: CPT | Performed by: FAMILY MEDICINE

## 2023-12-05 PROCEDURE — 6370000000 HC RX 637 (ALT 250 FOR IP): Performed by: INTERNAL MEDICINE

## 2023-12-05 PROCEDURE — 36415 COLL VENOUS BLD VENIPUNCTURE: CPT

## 2023-12-05 PROCEDURE — 97110 THERAPEUTIC EXERCISES: CPT

## 2023-12-05 PROCEDURE — 85027 COMPLETE CBC AUTOMATED: CPT

## 2023-12-05 PROCEDURE — 97530 THERAPEUTIC ACTIVITIES: CPT

## 2023-12-05 RX ADMIN — GABAPENTIN 300 MG: 300 CAPSULE ORAL at 09:32

## 2023-12-05 RX ADMIN — INSULIN LISPRO 4 UNITS: 100 INJECTION, SOLUTION INTRAVENOUS; SUBCUTANEOUS at 20:41

## 2023-12-05 RX ADMIN — GABAPENTIN 300 MG: 300 CAPSULE ORAL at 20:42

## 2023-12-05 RX ADMIN — ALLOPURINOL 100 MG: 100 TABLET ORAL at 09:32

## 2023-12-05 RX ADMIN — INSULIN GLARGINE 62 UNITS: 100 INJECTION, SOLUTION SUBCUTANEOUS at 20:41

## 2023-12-05 RX ADMIN — VENLAFAXINE HYDROCHLORIDE 75 MG: 75 CAPSULE, EXTENDED RELEASE ORAL at 09:31

## 2023-12-05 RX ADMIN — BUMETANIDE 3 MG: 1 TABLET ORAL at 09:32

## 2023-12-05 RX ADMIN — PRASUGREL 10 MG: 10 TABLET, FILM COATED ORAL at 09:31

## 2023-12-05 RX ADMIN — SODIUM CHLORIDE, PRESERVATIVE FREE 10 ML: 5 INJECTION INTRAVENOUS at 20:42

## 2023-12-05 RX ADMIN — INSULIN LISPRO 20 UNITS: 100 INJECTION, SOLUTION INTRAVENOUS; SUBCUTANEOUS at 09:32

## 2023-12-05 RX ADMIN — INSULIN LISPRO 12 UNITS: 100 INJECTION, SOLUTION INTRAVENOUS; SUBCUTANEOUS at 17:57

## 2023-12-05 RX ADMIN — INSULIN GLARGINE 62 UNITS: 100 INJECTION, SOLUTION SUBCUTANEOUS at 09:29

## 2023-12-05 RX ADMIN — SODIUM BICARBONATE 1300 MG: 650 TABLET ORAL at 09:32

## 2023-12-05 RX ADMIN — SEVELAMER CARBONATE 800 MG: 800 TABLET, FILM COATED ORAL at 12:28

## 2023-12-05 RX ADMIN — SODIUM BICARBONATE 1300 MG: 650 TABLET ORAL at 20:40

## 2023-12-05 RX ADMIN — BUSPIRONE HYDROCHLORIDE 10 MG: 10 TABLET ORAL at 09:32

## 2023-12-05 RX ADMIN — INSULIN LISPRO 12 UNITS: 100 INJECTION, SOLUTION INTRAVENOUS; SUBCUTANEOUS at 12:28

## 2023-12-05 RX ADMIN — BUSPIRONE HYDROCHLORIDE 10 MG: 10 TABLET ORAL at 20:41

## 2023-12-05 RX ADMIN — SODIUM BICARBONATE 1300 MG: 650 TABLET ORAL at 15:47

## 2023-12-05 RX ADMIN — BUMETANIDE 3 MG: 1 TABLET ORAL at 20:40

## 2023-12-05 RX ADMIN — DOCUSATE SODIUM 100 MG: 100 CAPSULE, LIQUID FILLED ORAL at 20:40

## 2023-12-05 RX ADMIN — INSULIN LISPRO 20 UNITS: 100 INJECTION, SOLUTION INTRAVENOUS; SUBCUTANEOUS at 17:58

## 2023-12-05 RX ADMIN — SEVELAMER CARBONATE 800 MG: 800 TABLET, FILM COATED ORAL at 09:31

## 2023-12-05 RX ADMIN — ATORVASTATIN CALCIUM 80 MG: 80 TABLET, FILM COATED ORAL at 20:41

## 2023-12-05 RX ADMIN — INSULIN LISPRO 20 UNITS: 100 INJECTION, SOLUTION INTRAVENOUS; SUBCUTANEOUS at 12:28

## 2023-12-05 RX ADMIN — SEVELAMER CARBONATE 800 MG: 800 TABLET, FILM COATED ORAL at 17:58

## 2023-12-05 RX ADMIN — CARVEDILOL 3.12 MG: 3.12 TABLET, FILM COATED ORAL at 20:40

## 2023-12-05 RX ADMIN — DOCUSATE SODIUM 100 MG: 100 CAPSULE, LIQUID FILLED ORAL at 09:32

## 2023-12-05 RX ADMIN — BUSPIRONE HYDROCHLORIDE 10 MG: 10 TABLET ORAL at 15:47

## 2023-12-05 RX ADMIN — CARVEDILOL 3.12 MG: 3.12 TABLET, FILM COATED ORAL at 09:32

## 2023-12-05 RX ADMIN — SODIUM CHLORIDE, PRESERVATIVE FREE 10 ML: 5 INJECTION INTRAVENOUS at 09:35

## 2023-12-05 RX ADMIN — ASPIRIN 81 MG: 81 TABLET, CHEWABLE ORAL at 09:32

## 2023-12-05 RX ADMIN — POTASSIUM CHLORIDE 20 MEQ: 1.5 FOR SOLUTION ORAL at 09:29

## 2023-12-05 ASSESSMENT — PAIN SCALES - GENERAL
PAINLEVEL_OUTOF10: 0
PAINLEVEL_OUTOF10: 5

## 2023-12-05 ASSESSMENT — PAIN DESCRIPTION - LOCATION: LOCATION: HIP

## 2023-12-05 ASSESSMENT — PAIN DESCRIPTION - ORIENTATION: ORIENTATION: RIGHT

## 2023-12-05 NOTE — PLAN OF CARE
Problem: Discharge Planning  Goal: Discharge to home or other facility with appropriate resources  Outcome: Progressing     Problem: Pain  Goal: Verbalizes/displays adequate comfort level or baseline comfort level  Outcome: Progressing     Problem: ABCDS Injury Assessment  Goal: Absence of physical injury  Outcome: Progressing  Flowsheets (Taken 12/5/2023 2464)  Absence of Physical Injury: Implement safety measures based on patient assessment     Problem: Chronic Conditions and Co-morbidities  Goal: Patient's chronic conditions and co-morbidity symptoms are monitored and maintained or improved  Outcome: Progressing

## 2023-12-06 VITALS
SYSTOLIC BLOOD PRESSURE: 131 MMHG | BODY MASS INDEX: 39.6 KG/M2 | TEMPERATURE: 98.4 F | DIASTOLIC BLOOD PRESSURE: 51 MMHG | HEART RATE: 70 BPM | OXYGEN SATURATION: 97 % | WEIGHT: 237.66 LBS | RESPIRATION RATE: 17 BRPM | HEIGHT: 65 IN

## 2023-12-06 LAB
ANION GAP SERPL CALCULATED.3IONS-SCNC: 14 MMOL/L (ref 9–17)
BUN SERPL-MCNC: 57 MG/DL (ref 8–23)
CALCIUM SERPL-MCNC: 8.9 MG/DL (ref 8.6–10.4)
CHLORIDE SERPL-SCNC: 98 MMOL/L (ref 98–107)
CO2 SERPL-SCNC: 24 MMOL/L (ref 20–31)
CREAT SERPL-MCNC: 4.4 MG/DL (ref 0.5–0.9)
GFR SERPL CREATININE-BSD FRML MDRD: 11 ML/MIN/1.73M2
GLUCOSE BLD-MCNC: 143 MG/DL (ref 65–105)
GLUCOSE BLD-MCNC: 229 MG/DL (ref 65–105)
GLUCOSE BLD-MCNC: 266 MG/DL (ref 65–105)
GLUCOSE BLD-MCNC: 276 MG/DL (ref 65–105)
GLUCOSE SERPL-MCNC: 259 MG/DL (ref 70–99)
PHOSPHATE SERPL-MCNC: 2.8 MG/DL (ref 2.6–4.5)
POTASSIUM SERPL-SCNC: 4.6 MMOL/L (ref 3.7–5.3)
SODIUM SERPL-SCNC: 136 MMOL/L (ref 135–144)

## 2023-12-06 PROCEDURE — 6360000002 HC RX W HCPCS: Performed by: FAMILY MEDICINE

## 2023-12-06 PROCEDURE — 97110 THERAPEUTIC EXERCISES: CPT

## 2023-12-06 PROCEDURE — 36415 COLL VENOUS BLD VENIPUNCTURE: CPT

## 2023-12-06 PROCEDURE — 97116 GAIT TRAINING THERAPY: CPT

## 2023-12-06 PROCEDURE — 6370000000 HC RX 637 (ALT 250 FOR IP): Performed by: INTERNAL MEDICINE

## 2023-12-06 PROCEDURE — 99232 SBSQ HOSP IP/OBS MODERATE 35: CPT | Performed by: FAMILY MEDICINE

## 2023-12-06 PROCEDURE — 82947 ASSAY GLUCOSE BLOOD QUANT: CPT

## 2023-12-06 PROCEDURE — 6370000000 HC RX 637 (ALT 250 FOR IP): Performed by: FAMILY MEDICINE

## 2023-12-06 PROCEDURE — 97530 THERAPEUTIC ACTIVITIES: CPT

## 2023-12-06 PROCEDURE — 2580000003 HC RX 258: Performed by: FAMILY MEDICINE

## 2023-12-06 PROCEDURE — 84100 ASSAY OF PHOSPHORUS: CPT

## 2023-12-06 PROCEDURE — 80048 BASIC METABOLIC PNL TOTAL CA: CPT

## 2023-12-06 RX ORDER — HEPARIN SODIUM 5000 [USP'U]/ML
5000 INJECTION, SOLUTION INTRAVENOUS; SUBCUTANEOUS DAILY
Status: DISCONTINUED | OUTPATIENT
Start: 2023-12-06 | End: 2023-12-06 | Stop reason: HOSPADM

## 2023-12-06 RX ADMIN — DOCUSATE SODIUM 100 MG: 100 CAPSULE, LIQUID FILLED ORAL at 08:26

## 2023-12-06 RX ADMIN — INSULIN LISPRO 20 UNITS: 100 INJECTION, SOLUTION INTRAVENOUS; SUBCUTANEOUS at 08:28

## 2023-12-06 RX ADMIN — POTASSIUM CHLORIDE 20 MEQ: 1.5 FOR SOLUTION ORAL at 08:26

## 2023-12-06 RX ADMIN — INSULIN GLARGINE 62 UNITS: 100 INJECTION, SOLUTION SUBCUTANEOUS at 08:27

## 2023-12-06 RX ADMIN — ASPIRIN 81 MG: 81 TABLET, CHEWABLE ORAL at 15:00

## 2023-12-06 RX ADMIN — VENLAFAXINE HYDROCHLORIDE 75 MG: 75 CAPSULE, EXTENDED RELEASE ORAL at 08:26

## 2023-12-06 RX ADMIN — PRASUGREL 10 MG: 10 TABLET, FILM COATED ORAL at 08:26

## 2023-12-06 RX ADMIN — SODIUM BICARBONATE 1300 MG: 650 TABLET ORAL at 14:00

## 2023-12-06 RX ADMIN — SEVELAMER CARBONATE 800 MG: 800 TABLET, FILM COATED ORAL at 08:26

## 2023-12-06 RX ADMIN — SEVELAMER CARBONATE 800 MG: 800 TABLET, FILM COATED ORAL at 14:00

## 2023-12-06 RX ADMIN — SODIUM CHLORIDE, PRESERVATIVE FREE 10 ML: 5 INJECTION INTRAVENOUS at 08:28

## 2023-12-06 RX ADMIN — INSULIN LISPRO 20 UNITS: 100 INJECTION, SOLUTION INTRAVENOUS; SUBCUTANEOUS at 14:01

## 2023-12-06 RX ADMIN — BUSPIRONE HYDROCHLORIDE 10 MG: 10 TABLET ORAL at 08:26

## 2023-12-06 RX ADMIN — HEPARIN SODIUM 5000 UNITS: 5000 INJECTION INTRAVENOUS; SUBCUTANEOUS at 14:00

## 2023-12-06 RX ADMIN — BUSPIRONE HYDROCHLORIDE 10 MG: 10 TABLET ORAL at 14:00

## 2023-12-06 RX ADMIN — ALLOPURINOL 100 MG: 100 TABLET ORAL at 08:26

## 2023-12-06 RX ADMIN — GABAPENTIN 300 MG: 300 CAPSULE ORAL at 08:26

## 2023-12-06 RX ADMIN — SODIUM BICARBONATE 1300 MG: 650 TABLET ORAL at 08:26

## 2023-12-06 RX ADMIN — INSULIN LISPRO 8 UNITS: 100 INJECTION, SOLUTION INTRAVENOUS; SUBCUTANEOUS at 08:27

## 2023-12-06 ASSESSMENT — PAIN SCALES - GENERAL: PAINLEVEL_OUTOF10: 5

## 2023-12-06 ASSESSMENT — PAIN DESCRIPTION - ORIENTATION: ORIENTATION: RIGHT

## 2023-12-06 ASSESSMENT — PAIN DESCRIPTION - LOCATION: LOCATION: HIP

## 2023-12-06 NOTE — CARE COORDINATION
Case Management Assessment  Initial Evaluation    Date/Time of Evaluation: 11/30/2023 11:52 AM  Assessment Completed by: Kriss Dela Cruz RN    If patient is discharged prior to next notation, then this note serves as note for discharge by case management. Patient Name: Kacy Hinds                   YOB: 1960  Diagnosis: NSTEMI (non-ST elevated myocardial infarction) [facility]) [I21.4]  Chest pain, unspecified type [R07.9]                   Date / Time: 11/29/2023  5:17 PM    Patient Admission Status: Inpatient   Readmission Risk (Low < 19, Mod (19-27), High > 27): Readmission Risk Score: 36.7    Current PCP: AFSANEH Newton CNP  PCP verified by CM? Yes    Chart Reviewed: Yes      History Provided by: Patient  Patient Orientation: Alert and Oriented    Patient Cognition: Alert    Hospitalization in the last 30 days (Readmission):  Yes    If yes, Readmission Assessment in CM Navigator will be completed. Advance Directives:      Code Status: Full Code   Patient's Primary Decision Maker is: Legal Next of Kin    Primary Decision Maker: Hailey Guido - Brother/Sister - [phone]    Secondary Decision Maker: Keena Lema - Brother/Sister - [phone]    Discharge Planning:    Patient lives with: Alone Type of Home: House  Primary Care Giver: Self  Patient Support Systems include: Family Members (Sisters)   Current Financial resources: Medicare, Medicaid  Current community resources: ECF/Home Care  Current services prior to admission: Durable Medical Equipment, Home Care, Other (Comment) (Dialysis, [facility], M/W/F at 10:15. Dr Ivonne Centeno)            Current DME: [facility] [place], Glucometer, [place], Wheelchair, Other (Comment) (Dex Com)            Type of Home Care services:  PT, OT, Skilled Therapy ([facility])    ADLS  Prior functional level: Independent in ADLs/IADLs, Assistance with the following:, Shopping, Other (see comment) (Driving)  Current functional level:  Shopping, Other
HENS complete.   Document ID : [id number]   Electronically signed by AZ Maxwell on 12/6/2023 at 3:26 PM
I was notified no beds available at [facility] today per [employer]. Writer called and left message for [employer] to start auth. Electronically signed by Radha Chambers RN on 12/4/2023 at 1:23 PM     Yodit with [facility] to start [address] today.  Electronically signed by Radha Chambers RN on 12/4/2023 at 1:45 PM
IMM letter provided to patient. Patient offered four hours to make informed decision regarding appeal process; patient agreeable to discharge.
IMM letter provided to patient. Patient offered four hours to make informed decision regarding appeal process; patient agreeable to discharge.      Electronically signed by AZ Evans on 12/6/2023 at 3:41 PM
ONGOING DISCHARGE PLAN:    Patient is alert and oriented x4. Spoke with patient regarding discharge plan and patient confirms that plan is still to DC to home alone. Sisters help with needs & will provide transportation home today. Pt. Will Continue w/ VNS, [facility]. VQ Scan/CXR neg. Cardio/Nephro on board. Pt. Will get Dialysis today. Will continue to follow for additional discharge needs. If patient is discharged prior to next notation, then this note serves as note for discharge by case management.     Electronically signed by Roberto Vance RN on 12/1/2023 at 11:08 AM
ONGOING DISCHARGE PLAN:    Patient is alert and oriented x4. Spoke with patient regarding discharge plan and patient confirms that plan is still to discharge to a facility     Referral sent to [address] [place] [address] 1st choice    Referral sent to [facility] 2nd choice    C collar removed       Will continue to follow for additional discharge needs. If patient is discharged prior to next notation, then this note serves as note for discharge by case management.     Electronically signed by Madison Barfield RN on 12/4/2023 at 8:53 AM
Transportation arranged for patient to go to [employer] at [address] [facility] at [address] [employer] [address] by wheelchair. Facility informed. Bedside nurse informed.      Number for Report: [phone]   Electronically signed by AZ Bruce on 12/6/2023 at 2:39 PM
Writer placed call to [facility] at [employer] [facility]. No answer, voicemail left. Electronically signed by AZ Lopes on 12/5/2023 at 8:55 AM    Writer spoke to Nella Tyson is still pending.   Electronically signed by AZ Lopes on 12/5/2023 at 1:37 PM
known as: OCEAN, BABY AYR  1 spray by Nasal route every 4 hours as needed for Congestion   True Metrix Go Glucose Meter w/Device Kit  1 each by Does not apply route 4 times daily   * TRUEplus 5-Bevel Pen Needles 29G X 12.7MM Misc  Generic drug: Insulin Pen Needle  USE FIVE TIMES DAILY   * Easy Touch Pen Needles 29G X 12MM Misc  Generic drug: Insulin Pen Needle  5 each by Does not apply route daily   venlafaxine 75 MG extended release capsule  Commonly known as: EFFEXOR XR  TAKE 1 CAPSULE BY MOUTH ONCE DAILY WITH BREAKFAST    very important  * This list has 10 medication(s) that are the same as other medications prescribed for you. Read the directions carefully, and ask your doctor or other care provider to review them with you.      Where to Get Your Medications      These medications were sent to   [address], [address] [place] [address] [address]  Phone: [phone]       Kunal Amador 100 UNIT/ML injection pen        lidocaine 4 % external patch      Printing Report    Report Name Print   Discharge Meds Print

## 2023-12-06 NOTE — PLAN OF CARE
Problem: Pain  Goal: Verbalizes/displays adequate comfort level or baseline comfort level  12/6/2023 0451 by Brian Almaraz RN  Outcome: Progressing  Flowsheets (Taken 12/6/2023 0451)  Verbalizes/displays adequate comfort level or baseline comfort level: Encourage patient to monitor pain and request assistance     Problem: Safety - Adult  Goal: Free from fall injury  Outcome: Progressing  Flowsheets (Taken 12/6/2023 0451)  Free From Fall Injury: Instruct family/caregiver on patient safety     Problem: Skin/Tissue Integrity  Goal: Absence of new skin breakdown  Description: 1. Monitor for areas of redness and/or skin breakdown  2. Assess vascular access sites hourly  3. Every 4-6 hours minimum:  Change oxygen saturation probe site  4. Every 4-6 hours:  If on nasal continuous positive airway pressure, respiratory therapy assess nares and determine need for appliance change or resting period. Outcome: Progressing  Note: Skin assessment as charted, assist patient with Q2 and PRN turning.

## 2024-01-01 ENCOUNTER — OFFICE VISIT (OUTPATIENT)
Dept: OPERATING ROOM | Age: 64
End: 2024-01-01
Attending: SURGERY

## 2024-01-15 RX ORDER — POTASSIUM CHLORIDE 750 MG/1
10 TABLET, EXTENDED RELEASE ORAL DAILY
Qty: 30 TABLET | Refills: 0 | OUTPATIENT
Start: 2024-01-15

## 2024-01-25 ENCOUNTER — HOSPITAL ENCOUNTER (OUTPATIENT)
Dept: CARDIAC REHAB | Age: 64
Setting detail: THERAPIES SERIES
Discharge: HOME | End: 2024-01-25
Payer: COMMERCIAL

## 2024-01-25 VITALS — OXYGEN SATURATION: 95 % | HEIGHT: 65 IN | WEIGHT: 242.5 LBS | BODY MASS INDEX: 40.4 KG/M2 | RESPIRATION RATE: 16 BRPM

## 2024-01-25 PROCEDURE — 93797 PHYS/QHP OP CAR RHAB WO ECG: CPT

## 2024-01-25 PROCEDURE — 93798 PHYS/QHP OP CAR RHAB W/ECG: CPT

## 2024-01-25 ASSESSMENT — PATIENT HEALTH QUESTIONNAIRE - PHQ9
1. LITTLE INTEREST OR PLEASURE IN DOING THINGS: 0
SUM OF ALL RESPONSES TO PHQ9 QUESTIONS 1 & 2: 0
SUM OF ALL RESPONSES TO PHQ QUESTIONS 1-9: 0
2. FEELING DOWN, DEPRESSED OR HOPELESS: 0
SUM OF ALL RESPONSES TO PHQ QUESTIONS 1-9: 0

## 2024-01-25 ASSESSMENT — EXERCISE STRESS TEST
PEAK_BP: 98/60
PEAK_RPE: 12
PEAK_BP: 98/60
PEAK_HR: 86
PEAK_METS: 2.1

## 2024-01-25 ASSESSMENT — EJECTION FRACTION: EF_VALUE: 50

## 2024-01-25 NOTE — DISCHARGE INSTRUCTIONS
effectiveness (potency) after the expiration date. Generally, tablets are good for 6 months. Ask for new tablets at least 2 weeks before they are due to .    SIDE EFFECTS:  The most common side effects of nitroglycerin are headache, dizziness, and flushing of the face and neck. These side effects are caused by increased blood flow through the relaxed blood vessels. If you have any concerns, let your health care provider know. If you develop a skin rash let your healthcare provider know.  Nitroglycerin is not habit forming or addicting. Do not be afraid to use it.  Record all angina attacks relieved by taking nitroglycerin and show this record to your healthcare provider at your regular check - up. If you have an increasing number of controlled attacks (symptoms go away after taking two or fewer nitroglycerin tablets and resting), notify your healthcare provider. He or she may need to adjust your daily medication or want to see you. Additionally, report any change in the type of angina to your healthcare provider within 24 hours of its occurrence. Always report angina experienced at night while sleeping winthin 24 hours of its occurrence.     Where can you learn more?   Go to [email or web address] and sign in to your [employer] account. Enter D709 in the Search Health Information box to learn more about “Cardiac Rehabilitation: After Your Visit.”    If you do not have an account, please click on the “Sign Up Now” link.     © 4049-9934 [employer]. Care instructions adapted under license by [facility] [employer] [facility]. This care instruction is for use with your licensed healthcare professional. If you have questions about a medical condition or this instruction, always ask your healthcare professional. [employer] disclaims any warranty or liability for your use of this information.  Content Version: 9.9.894470; Last Revised: 2013  Using Metabolic Equivalent for

## 2024-01-25 NOTE — FLOWSHEET NOTE
01/25/24 0955   Treatment Diagnosis   Treatment Diagnosis 1 PCI  (x4)   PCI Date 11/15/23   Treatment Diagnosis 2 PCI  (x1)   PCI Date 11/03/23   Referral Date 11/16/23   Significant Cardiovascular History Previous CABG;Previous myocardial infarction;History of heart failure  (CABG X 3 IN 2005, NSTEMI 10/30/2023, HTN, HLD, CHF)   Co-morbidities Diabetes;Renal disease;Previous MI;Cerebrovascular disease;Pulmonary disease  (ESRD - DIALYSIS  ON M/W/F, CVA W/ RIGHT SIDED DEFICIT IN 2021, ERICK - NO CPAP, HTN, HLD, LEFT AV FISTULA.)   Oxygen Saturation / Titration    Stages of Change  Maintenance   Oxygen Intervention   Oxygen Use No   O2 Sat Greater Than 90% Yes  (95%)   Nurse/Patient Discussion  YES   Individual Treatment Plan   ITP Visit Type Initial assessment   1st Date of Exercise  01/25/24   ITP Next Review Date 02/22/24   Visit #/Total Visits 1&2/36   EF% 50 %  (50-55%)   Risk Stratification Moderate   ITP Exercise;Psychosocial;Tobacco;Nutrition;Education   Exercise    DASI Total Score 13.2   Marinelli Total Score 30   Stages of Change Action   Assisted Devices Walker;Wheelchair   Exercise Prescription   Mode Treadmill;Bike;Stepper;Ergometer  (FREE WEIGHTS)   Frequency per week 2   Duration Per Session 31 MIN   Intensity METS       2.1   RPE 12   Progression INITIAL   Resistance Training Yes   Exercise Blood Pressures   Resting /64   Peak BP 98/60   Is BP WDL Yes   Exercise Activity at Home   Type DOES ARM AND LEG EXERCISES FROM HOME THERAPY   Frequency 2 TIMES A WEEK   Duration 15 MINS   Resistance Training Yes  (DOES HAVE WEIGHTS - LIGHT ONES)   Exercise Education   Education Self pulse;Exercise safety;Signs/symptoms to report;RPE scale;Equipment orientation;Warm up/cool down;Physically active   Exercise Target Goal   Target Goal(s) Individual exercise RX;BP < 140/90 or < 130/80, if DM or CKD;Aerobic activity 30 + minutes/day  5 days/week   Patient Stated Exercise Goals TO INCREASE STRENGTH AND ENDURANCE -

## 2024-01-25 NOTE — PROGRESS NOTES
Pt oriented to Cardiac Rehab, goals set and ITP initiated. CAD Risk Factors & Prevention video viewed.

## 2024-01-26 ENCOUNTER — TELEPHONE (OUTPATIENT)
Dept: VASCULAR SURGERY | Age: 64
End: 2024-01-26

## 2024-01-26 NOTE — TELEPHONE ENCOUNTER
Beth from [facility] called into the office and said patient is having pain in her had and coolness during dialysis.  Possible steal syndrome.  Patient has dialysis on M-W-F.      Patient scheduled for 01/29/2024 to see Dr. Marinelli for evaluation.    Are you having issues cannulating? No    If so which needle are you having issues with?  N/A    Is the bruit and thrill present? Yes    Is the patient running at their prescribed Blood Flow Rate? Yes      Does the patient have a good cleaning number?  Yes    Is there any prolonged bleeding after treatment? No

## 2024-01-29 ENCOUNTER — OFFICE VISIT (OUTPATIENT)
Dept: VASCULAR SURGERY | Age: 64
End: 2024-01-29
Payer: COMMERCIAL

## 2024-01-29 ENCOUNTER — TELEPHONE (OUTPATIENT)
Dept: VASCULAR SURGERY | Age: 64
End: 2024-01-29

## 2024-01-29 VITALS
OXYGEN SATURATION: 92 % | WEIGHT: 242 LBS | HEIGHT: 65 IN | BODY MASS INDEX: 40.32 KG/M2 | DIASTOLIC BLOOD PRESSURE: 63 MMHG | SYSTOLIC BLOOD PRESSURE: 105 MMHG | RESPIRATION RATE: 16 BRPM | HEART RATE: 66 BPM

## 2024-01-29 DIAGNOSIS — N18.6 END STAGE RENAL DISEASE (HCC): ICD-10-CM

## 2024-01-29 DIAGNOSIS — T82.898A STEAL SYNDROME AS COMPLICATION OF DIALYSIS ACCESS, INITIAL ENCOUNTER (HCC): Primary | ICD-10-CM

## 2024-01-29 PROCEDURE — 99214 OFFICE O/P EST MOD 30 MIN: CPT | Performed by: SURGERY

## 2024-01-29 PROCEDURE — 3078F DIAST BP <80 MM HG: CPT | Performed by: SURGERY

## 2024-01-29 PROCEDURE — 1123F ACP DISCUSS/DSCN MKR DOCD: CPT | Performed by: SURGERY

## 2024-01-29 PROCEDURE — 3017F COLORECTAL CA SCREEN DOC REV: CPT | Performed by: SURGERY

## 2024-01-29 PROCEDURE — 1090F PRES/ABSN URINE INCON ASSESS: CPT | Performed by: SURGERY

## 2024-01-29 PROCEDURE — G8417 CALC BMI ABV UP PARAM F/U: HCPCS | Performed by: SURGERY

## 2024-01-29 PROCEDURE — 3074F SYST BP LT 130 MM HG: CPT | Performed by: SURGERY

## 2024-01-29 PROCEDURE — G8400 PT W/DXA NO RESULTS DOC: HCPCS | Performed by: SURGERY

## 2024-01-29 PROCEDURE — G8484 FLU IMMUNIZE NO ADMIN: HCPCS | Performed by: SURGERY

## 2024-01-29 PROCEDURE — G8427 DOCREV CUR MEDS BY ELIG CLIN: HCPCS | Performed by: SURGERY

## 2024-01-29 PROCEDURE — 1036F TOBACCO NON-USER: CPT | Performed by: SURGERY

## 2024-01-29 NOTE — TELEPHONE ENCOUNTER
Schedule for left upper extremity arteriography via the right groin to evaluate steal syndrome of left upper extremity AV fistula.  Monitored anesthesia care.  30 minutes.  [facility]. Consent was signed in office.

## 2024-01-29 NOTE — PROGRESS NOTES
[facility] HEART AND VASCULAR  [address], [place] [facility] [address]  Dept: [phone]     Patient: Estefany Garcia  : 1960  MRN: [id number]  DOS: 2024    Referring provider:  No ref. provider found         HPI:  Estefany Garcia is a 63 y.o. female who returns to the office for evaluation of a complication of her left upper extremity brachiocephalic fistula.  Recall that she required revision of her brachiocephalic fistula to move it to the surface with superficialization.  She is running well with dialysis currently.  Over the last 1-1/2 to 2 months she has developed numbness in her hand and some pain in her forearm.  Her hand becomes more numb during dialysis.  She has no ulcers on the fingers.  She has no significant pain in the hand except when it becomes significantly numb.  This happens regularly.  She is not currently.  Past Medical History:   Diagnosis Date    Arthritis     Backache, unspecified     CAD (coronary artery disease)     Cerebral artery occlusion with cerebral infarction (HCC)     CHF (congestive heart failure) (HCC)     Chronic kidney disease     Coronary atherosclerosis of artery bypass graft     COVID 2022    Cramp of limb     Epistaxis 2023    Gallstones     Hemodialysis patient (HCC)      AND   /   IN [place]    Hyperlipidemia     Hypertension     Insomnia     Neuromuscular disorder ([facility])     Pneumonia     Psychiatric problem     Thyroid disease     Type II or unspecified type diabetes mellitus with renal manifestations, not stated as uncontrolled(250.40)     Type II or unspecified type diabetes mellitus without mention of complication, not stated as uncontrolled     Unspecified vitamin D deficiency      Family History   Problem Relation Age of Onset    Diabetes Father     Heart Failure Father       Social History     Socioeconomic History    Marital status: Single

## 2024-01-29 NOTE — H&P (VIEW-ONLY)
going through the fistula at the expense of the forearm and hand.  If there is reversal of flow in the distal brachial artery distal to the level of the fistula, 1 can be more certain that his steal syndrome is truly occurring.  We will see her in the operating room in the near future for fistulography and arteriography of the left upper extremity.  She understands that there are risks to these procedures including but not limited to bleeding, infection, hematoma, the need for open operation and the need for fistula revision.    Electronically signed by:  Bib Marinelli MD

## 2024-01-30 ENCOUNTER — HOSPITAL ENCOUNTER (OUTPATIENT)
Dept: CARDIAC REHAB | Age: 64
Setting detail: THERAPIES SERIES
Discharge: HOME | End: 2024-01-30
Payer: COMMERCIAL

## 2024-01-30 VITALS — BODY MASS INDEX: 40.92 KG/M2 | WEIGHT: 245.9 LBS

## 2024-01-30 PROCEDURE — 9900000065 HC CARDIAC REHAB PHASE 3 - 1 VISIT

## 2024-01-30 ASSESSMENT — EXERCISE STRESS TEST
PEAK_RPE: 12
PEAK_BP: 92/50
PEAK_HR: 82
PEAK_METS: 2.2

## 2024-02-01 ENCOUNTER — HOSPITAL ENCOUNTER (INPATIENT)
Age: 64
LOS: 1 days | Discharge: HOME | DRG: 313 | End: 2024-02-03
Attending: EMERGENCY MEDICINE | Admitting: STUDENT IN AN ORGANIZED HEALTH CARE EDUCATION/TRAINING PROGRAM
Payer: COMMERCIAL

## 2024-02-01 ENCOUNTER — APPOINTMENT (OUTPATIENT)
Dept: GENERAL RADIOLOGY | Age: 64
DRG: 313 | End: 2024-02-01
Payer: COMMERCIAL

## 2024-02-01 ENCOUNTER — HOSPITAL ENCOUNTER (OUTPATIENT)
Dept: CARDIAC REHAB | Age: 64
Setting detail: THERAPIES SERIES
Discharge: HOME | End: 2024-02-01
Payer: COMMERCIAL

## 2024-02-01 VITALS — WEIGHT: 246.3 LBS | BODY MASS INDEX: 40.99 KG/M2

## 2024-02-01 DIAGNOSIS — Z99.2 ESRD (END STAGE RENAL DISEASE) ON DIALYSIS (HCC): ICD-10-CM

## 2024-02-01 DIAGNOSIS — R07.9 CHEST PAIN, UNSPECIFIED TYPE: Primary | ICD-10-CM

## 2024-02-01 DIAGNOSIS — N18.6 ESRD (END STAGE RENAL DISEASE) ON DIALYSIS (HCC): ICD-10-CM

## 2024-02-01 DIAGNOSIS — Z95.5 H/O HEART ARTERY STENT: ICD-10-CM

## 2024-02-01 DIAGNOSIS — I21.4 NSTEMI (NON-ST ELEVATED MYOCARDIAL INFARCTION) (HCC): ICD-10-CM

## 2024-02-01 LAB
ALBUMIN SERPL-MCNC: 3.8 G/DL (ref 3.5–5.2)
ALP SERPL-CCNC: 164 U/L (ref 35–104)
ALT SERPL-CCNC: 37 U/L (ref 5–33)
ANION GAP SERPL CALCULATED.3IONS-SCNC: 15 MMOL/L (ref 9–17)
AST SERPL-CCNC: 34 U/L
BASOPHILS # BLD: 0 K/UL (ref 0–0.2)
BASOPHILS NFR BLD: 1 % (ref 0–2)
BILIRUB SERPL-MCNC: 0.5 MG/DL (ref 0.3–1.2)
BNP SERPL-MCNC: 987 PG/ML
BUN SERPL-MCNC: 45 MG/DL (ref 8–23)
CALCIUM SERPL-MCNC: 10.3 MG/DL (ref 8.6–10.4)
CHLORIDE SERPL-SCNC: 93 MMOL/L (ref 98–107)
CO2 SERPL-SCNC: 27 MMOL/L (ref 20–31)
CREAT SERPL-MCNC: 4.2 MG/DL (ref 0.5–0.9)
EOSINOPHIL # BLD: 0.2 K/UL (ref 0–0.4)
EOSINOPHILS RELATIVE PERCENT: 3 % (ref 0–4)
ERYTHROCYTE [DISTWIDTH] IN BLOOD BY AUTOMATED COUNT: 16.6 % (ref 11.5–14.9)
GFR SERPL CREATININE-BSD FRML MDRD: 11 ML/MIN/1.73M2
GLUCOSE SERPL-MCNC: 174 MG/DL (ref 70–99)
HCT VFR BLD AUTO: 37.3 % (ref 36–46)
HGB BLD-MCNC: 12.4 G/DL (ref 12–16)
INR PPP: 0.9
LYMPHOCYTES NFR BLD: 1.5 K/UL (ref 1–4.8)
LYMPHOCYTES RELATIVE PERCENT: 22 % (ref 24–44)
MAGNESIUM SERPL-MCNC: 3.1 MG/DL (ref 1.6–2.6)
MCH RBC QN AUTO: 35.5 PG (ref 26–34)
MCHC RBC AUTO-ENTMCNC: 33.2 G/DL (ref 31–37)
MCV RBC AUTO: 107 FL (ref 80–100)
MONOCYTES NFR BLD: 0.5 K/UL (ref 0.1–1.3)
MONOCYTES NFR BLD: 7 % (ref 1–7)
NEUTROPHILS NFR BLD: 67 % (ref 36–66)
NEUTS SEG NFR BLD: 4.6 K/UL (ref 1.3–9.1)
PLATELET # BLD AUTO: 194 K/UL (ref 150–450)
PMV BLD AUTO: 8.2 FL (ref 6–12)
POTASSIUM SERPL-SCNC: 4.7 MMOL/L (ref 3.7–5.3)
PROT SERPL-MCNC: 7.6 G/DL (ref 6.4–8.3)
PROTHROMBIN TIME: 12.9 SEC (ref 11.8–14.6)
RBC # BLD AUTO: 3.48 M/UL (ref 4–5.2)
SODIUM SERPL-SCNC: 135 MMOL/L (ref 135–144)
TROPONIN I SERPL HS-MCNC: 84 NG/L (ref 0–14)
TROPONIN I SERPL HS-MCNC: 86 NG/L (ref 0–14)
WBC OTHER # BLD: 6.8 K/UL (ref 3.5–11)

## 2024-02-01 PROCEDURE — 93005 ELECTROCARDIOGRAM TRACING: CPT

## 2024-02-01 PROCEDURE — 84484 ASSAY OF TROPONIN QUANT: CPT

## 2024-02-01 PROCEDURE — 6360000002 HC RX W HCPCS: Performed by: EMERGENCY MEDICINE

## 2024-02-01 PROCEDURE — 71045 X-RAY EXAM CHEST 1 VIEW: CPT

## 2024-02-01 PROCEDURE — 96374 THER/PROPH/DIAG INJ IV PUSH: CPT

## 2024-02-01 PROCEDURE — 85610 PROTHROMBIN TIME: CPT

## 2024-02-01 PROCEDURE — 99285 EMERGENCY DEPT VISIT HI MDM: CPT

## 2024-02-01 PROCEDURE — 83735 ASSAY OF MAGNESIUM: CPT

## 2024-02-01 PROCEDURE — 36415 COLL VENOUS BLD VENIPUNCTURE: CPT

## 2024-02-01 PROCEDURE — 85025 COMPLETE CBC W/AUTO DIFF WBC: CPT

## 2024-02-01 PROCEDURE — 83880 ASSAY OF NATRIURETIC PEPTIDE: CPT

## 2024-02-01 PROCEDURE — 93798 PHYS/QHP OP CAR RHAB W/ECG: CPT

## 2024-02-01 PROCEDURE — 80053 COMPREHEN METABOLIC PANEL: CPT

## 2024-02-01 RX ORDER — MORPHINE SULFATE 4 MG/ML
4 INJECTION, SOLUTION INTRAMUSCULAR; INTRAVENOUS ONCE
Status: COMPLETED | OUTPATIENT
Start: 2024-02-01 | End: 2024-02-01

## 2024-02-01 RX ADMIN — MORPHINE SULFATE 4 MG: 4 INJECTION, SOLUTION INTRAMUSCULAR; INTRAVENOUS at 23:01

## 2024-02-01 ASSESSMENT — EXERCISE STRESS TEST
PEAK_METS: 2.6
PEAK_RPE: 12
PEAK_BP: 102/56
PEAK_HR: 82

## 2024-02-01 ASSESSMENT — PAIN SCALES - GENERAL: PAINLEVEL_OUTOF10: 9

## 2024-02-01 ASSESSMENT — PAIN DESCRIPTION - LOCATION: LOCATION: CHEST

## 2024-02-02 ENCOUNTER — APPOINTMENT (OUTPATIENT)
Age: 64
DRG: 313 | End: 2024-02-02
Attending: INTERNAL MEDICINE
Payer: COMMERCIAL

## 2024-02-02 LAB
ECHO AO ASC DIAM: 2.8 CM
ECHO AO ASCENDING AORTA INDEX: 1.3 CM/M2
ECHO AO ROOT DIAM: 2.9 CM
ECHO AO ROOT INDEX: 1.35 CM/M2
ECHO AV AREA PEAK VELOCITY: 2.1 CM2
ECHO AV AREA/BSA PEAK VELOCITY: 1 CM2/M2
ECHO AV PEAK GRADIENT: 7 MMHG
ECHO AV PEAK VELOCITY: 1.3 M/S
ECHO AV VELOCITY RATIO: 0.69
ECHO BSA: 2.25 M2
ECHO LA AREA 2C: 22.1 CM2
ECHO LA AREA 4C: 13.7 CM2
ECHO LA DIAMETER INDEX: 2.05 CM/M2
ECHO LA DIAMETER: 4.4 CM
ECHO LA MAJOR AXIS: 5 CM
ECHO LA MINOR AXIS: 5.7 CM
ECHO LA TO AORTIC ROOT RATIO: 1.52
ECHO LA VOL BP: 49 ML (ref 22–52)
ECHO LA VOL MOD A2C: 69 ML (ref 22–52)
ECHO LA VOL MOD A4C: 30 ML (ref 22–52)
ECHO LA VOL/BSA BIPLANE: 23 ML/M2 (ref 16–34)
ECHO LA VOLUME INDEX MOD A2C: 32 ML/M2 (ref 16–34)
ECHO LA VOLUME INDEX MOD A4C: 14 ML/M2 (ref 16–34)
ECHO LV E' LATERAL VELOCITY: 6 CM/S
ECHO LV E' SEPTAL VELOCITY: 5 CM/S
ECHO LV FRACTIONAL SHORTENING: 23 % (ref 28–44)
ECHO LV INTERNAL DIMENSION DIASTOLE INDEX: 2.05 CM/M2
ECHO LV INTERNAL DIMENSION DIASTOLIC: 4.4 CM (ref 3.9–5.3)
ECHO LV INTERNAL DIMENSION SYSTOLIC INDEX: 1.58 CM/M2
ECHO LV INTERNAL DIMENSION SYSTOLIC: 3.4 CM
ECHO LV IVSD: 1 CM (ref 0.6–0.9)
ECHO LV MASS 2D: 137.8 G (ref 67–162)
ECHO LV MASS INDEX 2D: 64.1 G/M2 (ref 43–95)
ECHO LV POSTERIOR WALL DIASTOLIC: 0.9 CM (ref 0.6–0.9)
ECHO LV RELATIVE WALL THICKNESS RATIO: 0.41
ECHO LVOT AREA: 3.1 CM2
ECHO LVOT DIAM: 2 CM
ECHO LVOT MEAN GRADIENT: 2 MMHG
ECHO LVOT PEAK GRADIENT: 3 MMHG
ECHO LVOT PEAK VELOCITY: 0.9 M/S
ECHO LVOT STROKE VOLUME INDEX: 30.8 ML/M2
ECHO LVOT SV: 66.3 ML
ECHO LVOT VTI: 21.1 CM
ECHO MV A VELOCITY: 0.69 M/S
ECHO MV E DECELERATION TIME (DT): 243 MS
ECHO MV E VELOCITY: 0.98 M/S
ECHO MV E/A RATIO: 1.42
ECHO MV E/E' LATERAL: 16.33
ECHO MV E/E' RATIO (AVERAGED): 17.97
ECHO PV MAX VELOCITY: 0.8 M/S
ECHO PV PEAK GRADIENT: 2 MMHG
ECHO RV TAPSE: 1.4 CM (ref 1.7–?)
ECHO TV REGURGITANT MAX VELOCITY: 1.78 M/S
ECHO TV REGURGITANT PEAK GRADIENT: 13 MMHG
EKG ATRIAL RATE: 60 BPM
EKG P AXIS: 55 DEGREES
EKG P-R INTERVAL: 176 MS
EKG Q-T INTERVAL: 456 MS
EKG QRS DURATION: 98 MS
EKG QTC CALCULATION (BAZETT): 456 MS
EKG R AXIS: 68 DEGREES
EKG T AXIS: -24 DEGREES
EKG VENTRICULAR RATE: 60 BPM
GLUCOSE BLD-MCNC: 322 MG/DL (ref 65–105)
GLUCOSE BLD-MCNC: 329 MG/DL (ref 65–105)
GLUCOSE BLD-MCNC: 336 MG/DL (ref 65–105)
GLUCOSE BLD-MCNC: 397 MG/DL (ref 65–105)

## 2024-02-02 PROCEDURE — 97161 PT EVAL LOW COMPLEX 20 MIN: CPT

## 2024-02-02 PROCEDURE — 99223 1ST HOSP IP/OBS HIGH 75: CPT | Performed by: INTERNAL MEDICINE

## 2024-02-02 PROCEDURE — 2580000003 HC RX 258: Performed by: STUDENT IN AN ORGANIZED HEALTH CARE EDUCATION/TRAINING PROGRAM

## 2024-02-02 PROCEDURE — 99223 1ST HOSP IP/OBS HIGH 75: CPT | Performed by: STUDENT IN AN ORGANIZED HEALTH CARE EDUCATION/TRAINING PROGRAM

## 2024-02-02 PROCEDURE — 97166 OT EVAL MOD COMPLEX 45 MIN: CPT

## 2024-02-02 PROCEDURE — 5A1D70Z PERFORMANCE OF URINARY FILTRATION, INTERMITTENT, LESS THAN 6 HOURS PER DAY: ICD-10-PCS | Performed by: INTERNAL MEDICINE

## 2024-02-02 PROCEDURE — 93306 TTE W/DOPPLER COMPLETE: CPT

## 2024-02-02 PROCEDURE — 90935 HEMODIALYSIS ONE EVALUATION: CPT

## 2024-02-02 PROCEDURE — 93306 TTE W/DOPPLER COMPLETE: CPT | Performed by: INTERNAL MEDICINE

## 2024-02-02 PROCEDURE — 82947 ASSAY GLUCOSE BLOOD QUANT: CPT

## 2024-02-02 PROCEDURE — 6370000000 HC RX 637 (ALT 250 FOR IP): Performed by: STUDENT IN AN ORGANIZED HEALTH CARE EDUCATION/TRAINING PROGRAM

## 2024-02-02 PROCEDURE — 2060000000 HC ICU INTERMEDIATE R&B

## 2024-02-02 PROCEDURE — 6360000002 HC RX W HCPCS: Performed by: STUDENT IN AN ORGANIZED HEALTH CARE EDUCATION/TRAINING PROGRAM

## 2024-02-02 RX ORDER — HEPARIN SODIUM 5000 [USP'U]/ML
5000 INJECTION, SOLUTION INTRAVENOUS; SUBCUTANEOUS EVERY 8 HOURS SCHEDULED
Status: DISCONTINUED | OUTPATIENT
Start: 2024-02-02 | End: 2024-02-03 | Stop reason: HOSPADM

## 2024-02-02 RX ORDER — BUSPIRONE HYDROCHLORIDE 10 MG/1
10 TABLET ORAL 3 TIMES DAILY
Status: DISCONTINUED | OUTPATIENT
Start: 2024-02-02 | End: 2024-02-03 | Stop reason: HOSPADM

## 2024-02-02 RX ORDER — INSULIN GLARGINE 100 [IU]/ML
42 INJECTION, SOLUTION SUBCUTANEOUS NIGHTLY
Status: DISCONTINUED | OUTPATIENT
Start: 2024-02-03 | End: 2024-02-03 | Stop reason: HOSPADM

## 2024-02-02 RX ORDER — PRASUGREL 10 MG/1
10 TABLET, FILM COATED ORAL DAILY
Status: DISCONTINUED | OUTPATIENT
Start: 2024-02-02 | End: 2024-02-03 | Stop reason: HOSPADM

## 2024-02-02 RX ORDER — NITROGLYCERIN 0.4 MG/1
0.4 TABLET SUBLINGUAL EVERY 5 MIN PRN
Status: DISCONTINUED | OUTPATIENT
Start: 2024-02-02 | End: 2024-02-03 | Stop reason: HOSPADM

## 2024-02-02 RX ORDER — SODIUM CHLORIDE 0.9 % (FLUSH) 0.9 %
5-40 SYRINGE (ML) INJECTION PRN
Status: DISCONTINUED | OUTPATIENT
Start: 2024-02-02 | End: 2024-02-03 | Stop reason: HOSPADM

## 2024-02-02 RX ORDER — ONDANSETRON 2 MG/ML
4 INJECTION INTRAMUSCULAR; INTRAVENOUS EVERY 6 HOURS PRN
Status: DISCONTINUED | OUTPATIENT
Start: 2024-02-02 | End: 2024-02-03 | Stop reason: HOSPADM

## 2024-02-02 RX ORDER — INSULIN GLARGINE 100 [IU]/ML
65 INJECTION, SOLUTION SUBCUTANEOUS NIGHTLY
Status: DISCONTINUED | OUTPATIENT
Start: 2024-02-02 | End: 2024-02-02

## 2024-02-02 RX ORDER — SEVELAMER CARBONATE 800 MG/1
1600 TABLET, FILM COATED ORAL
Status: DISCONTINUED | OUTPATIENT
Start: 2024-02-02 | End: 2024-02-03 | Stop reason: HOSPADM

## 2024-02-02 RX ORDER — SODIUM BICARBONATE 650 MG/1
1300 TABLET ORAL 3 TIMES DAILY
Status: DISCONTINUED | OUTPATIENT
Start: 2024-02-02 | End: 2024-02-03 | Stop reason: HOSPADM

## 2024-02-02 RX ORDER — ONDANSETRON 4 MG/1
4 TABLET, ORALLY DISINTEGRATING ORAL EVERY 8 HOURS PRN
Status: DISCONTINUED | OUTPATIENT
Start: 2024-02-02 | End: 2024-02-03 | Stop reason: HOSPADM

## 2024-02-02 RX ORDER — ASPIRIN 81 MG/1
81 TABLET, CHEWABLE ORAL DAILY
Status: DISCONTINUED | OUTPATIENT
Start: 2024-02-02 | End: 2024-02-03 | Stop reason: HOSPADM

## 2024-02-02 RX ORDER — ALLOPURINOL 100 MG/1
100 TABLET ORAL DAILY
Status: DISCONTINUED | OUTPATIENT
Start: 2024-02-02 | End: 2024-02-03 | Stop reason: HOSPADM

## 2024-02-02 RX ORDER — DEXTROSE MONOHYDRATE 100 MG/ML
INJECTION, SOLUTION INTRAVENOUS CONTINUOUS PRN
Status: DISCONTINUED | OUTPATIENT
Start: 2024-02-02 | End: 2024-02-03 | Stop reason: HOSPADM

## 2024-02-02 RX ORDER — GABAPENTIN 300 MG/1
300 CAPSULE ORAL 2 TIMES DAILY
Status: DISCONTINUED | OUTPATIENT
Start: 2024-02-02 | End: 2024-02-03 | Stop reason: HOSPADM

## 2024-02-02 RX ORDER — CYCLOBENZAPRINE HCL 10 MG
5 TABLET ORAL 3 TIMES DAILY PRN
Status: DISCONTINUED | OUTPATIENT
Start: 2024-02-02 | End: 2024-02-03 | Stop reason: HOSPADM

## 2024-02-02 RX ORDER — INSULIN GLARGINE 100 [IU]/ML
35 INJECTION, SOLUTION SUBCUTANEOUS DAILY
Status: DISCONTINUED | OUTPATIENT
Start: 2024-02-02 | End: 2024-02-02

## 2024-02-02 RX ORDER — VENLAFAXINE HYDROCHLORIDE 75 MG/1
75 CAPSULE, EXTENDED RELEASE ORAL
Status: DISCONTINUED | OUTPATIENT
Start: 2024-02-02 | End: 2024-02-03 | Stop reason: HOSPADM

## 2024-02-02 RX ORDER — ACETAMINOPHEN 325 MG/1
650 TABLET ORAL EVERY 6 HOURS PRN
Status: DISCONTINUED | OUTPATIENT
Start: 2024-02-02 | End: 2024-02-03 | Stop reason: HOSPADM

## 2024-02-02 RX ORDER — POLYETHYLENE GLYCOL 3350 17 G/17G
17 POWDER, FOR SOLUTION ORAL DAILY PRN
Status: DISCONTINUED | OUTPATIENT
Start: 2024-02-02 | End: 2024-02-03 | Stop reason: HOSPADM

## 2024-02-02 RX ORDER — SODIUM CHLORIDE 9 MG/ML
INJECTION, SOLUTION INTRAVENOUS PRN
Status: DISCONTINUED | OUTPATIENT
Start: 2024-02-02 | End: 2024-02-03 | Stop reason: HOSPADM

## 2024-02-02 RX ORDER — LANOLIN ALCOHOL/MO/W.PET/CERES
9 CREAM (GRAM) TOPICAL NIGHTLY PRN
Status: DISCONTINUED | OUTPATIENT
Start: 2024-02-02 | End: 2024-02-03 | Stop reason: HOSPADM

## 2024-02-02 RX ORDER — ACETAMINOPHEN 650 MG/1
650 SUPPOSITORY RECTAL EVERY 6 HOURS PRN
Status: DISCONTINUED | OUTPATIENT
Start: 2024-02-02 | End: 2024-02-03 | Stop reason: HOSPADM

## 2024-02-02 RX ORDER — SODIUM CHLORIDE 0.9 % (FLUSH) 0.9 %
5-40 SYRINGE (ML) INJECTION EVERY 12 HOURS SCHEDULED
Status: DISCONTINUED | OUTPATIENT
Start: 2024-02-02 | End: 2024-02-03 | Stop reason: HOSPADM

## 2024-02-02 RX ORDER — INSULIN GLARGINE 100 [IU]/ML
65 INJECTION, SOLUTION SUBCUTANEOUS EVERY MORNING
Status: DISCONTINUED | OUTPATIENT
Start: 2024-02-02 | End: 2024-02-02

## 2024-02-02 RX ORDER — ATORVASTATIN CALCIUM 80 MG/1
80 TABLET, FILM COATED ORAL NIGHTLY
Status: DISCONTINUED | OUTPATIENT
Start: 2024-02-02 | End: 2024-02-03 | Stop reason: HOSPADM

## 2024-02-02 RX ORDER — CARBOXYMETHYLCELLULOSE SODIUM 5 MG/ML
1 SOLUTION/ DROPS OPHTHALMIC PRN
Status: DISCONTINUED | OUTPATIENT
Start: 2024-02-02 | End: 2024-02-03 | Stop reason: HOSPADM

## 2024-02-02 RX ORDER — BUMETANIDE 1 MG/1
2 TABLET ORAL 2 TIMES DAILY
Status: DISCONTINUED | OUTPATIENT
Start: 2024-02-02 | End: 2024-02-03 | Stop reason: HOSPADM

## 2024-02-02 RX ORDER — INSULIN GLARGINE 100 [IU]/ML
70 INJECTION, SOLUTION SUBCUTANEOUS EVERY MORNING
Status: DISCONTINUED | OUTPATIENT
Start: 2024-02-03 | End: 2024-02-03 | Stop reason: HOSPADM

## 2024-02-02 RX ORDER — INSULIN GLARGINE 100 [IU]/ML
35 INJECTION, SOLUTION SUBCUTANEOUS NIGHTLY
Status: DISCONTINUED | OUTPATIENT
Start: 2024-02-03 | End: 2024-02-02

## 2024-02-02 RX ORDER — MIDODRINE HYDROCHLORIDE 5 MG/1
5 TABLET ORAL PRN
Status: DISCONTINUED | OUTPATIENT
Start: 2024-02-02 | End: 2024-02-03 | Stop reason: HOSPADM

## 2024-02-02 RX ORDER — CARVEDILOL 3.12 MG/1
3.12 TABLET ORAL 2 TIMES DAILY WITH MEALS
Status: DISCONTINUED | OUTPATIENT
Start: 2024-02-02 | End: 2024-02-03 | Stop reason: HOSPADM

## 2024-02-02 RX ORDER — INSULIN LISPRO 100 [IU]/ML
20 INJECTION, SOLUTION INTRAVENOUS; SUBCUTANEOUS
Status: DISCONTINUED | OUTPATIENT
Start: 2024-02-02 | End: 2024-02-03 | Stop reason: HOSPADM

## 2024-02-02 RX ADMIN — SODIUM BICARBONATE 1300 MG: 650 TABLET ORAL at 14:49

## 2024-02-02 RX ADMIN — INSULIN LISPRO 20 UNITS: 100 INJECTION, SOLUTION INTRAVENOUS; SUBCUTANEOUS at 18:35

## 2024-02-02 RX ADMIN — BUSPIRONE HYDROCHLORIDE 10 MG: 10 TABLET ORAL at 21:58

## 2024-02-02 RX ADMIN — SODIUM BICARBONATE 1300 MG: 650 TABLET ORAL at 08:27

## 2024-02-02 RX ADMIN — HEPARIN SODIUM 5000 UNITS: 5000 INJECTION INTRAVENOUS; SUBCUTANEOUS at 06:16

## 2024-02-02 RX ADMIN — MIDODRINE HYDROCHLORIDE 5 MG: 5 TABLET ORAL at 08:41

## 2024-02-02 RX ADMIN — BUMETANIDE 2 MG: 1 TABLET ORAL at 18:36

## 2024-02-02 RX ADMIN — VENLAFAXINE HYDROCHLORIDE 75 MG: 75 CAPSULE, EXTENDED RELEASE ORAL at 08:27

## 2024-02-02 RX ADMIN — NITROGLYCERIN 0.4 MG: 0.4 TABLET, ORALLY DISINTEGRATING SUBLINGUAL at 12:45

## 2024-02-02 RX ADMIN — INSULIN GLARGINE 65 UNITS: 100 INJECTION, SOLUTION SUBCUTANEOUS at 14:49

## 2024-02-02 RX ADMIN — CARVEDILOL 3.12 MG: 3.12 TABLET, FILM COATED ORAL at 18:36

## 2024-02-02 RX ADMIN — PRASUGREL 10 MG: 10 TABLET, FILM COATED ORAL at 15:02

## 2024-02-02 RX ADMIN — HEPARIN SODIUM 5000 UNITS: 5000 INJECTION INTRAVENOUS; SUBCUTANEOUS at 21:58

## 2024-02-02 RX ADMIN — CARVEDILOL 3.12 MG: 3.12 TABLET, FILM COATED ORAL at 08:28

## 2024-02-02 RX ADMIN — SODIUM CHLORIDE, PRESERVATIVE FREE 10 ML: 5 INJECTION INTRAVENOUS at 08:30

## 2024-02-02 RX ADMIN — ATORVASTATIN CALCIUM 80 MG: 80 TABLET, FILM COATED ORAL at 21:58

## 2024-02-02 RX ADMIN — SEVELAMER CARBONATE 1600 MG: 800 TABLET, FILM COATED ORAL at 15:02

## 2024-02-02 RX ADMIN — BUSPIRONE HYDROCHLORIDE 10 MG: 10 TABLET ORAL at 08:28

## 2024-02-02 RX ADMIN — HEPARIN SODIUM 5000 UNITS: 5000 INJECTION INTRAVENOUS; SUBCUTANEOUS at 14:50

## 2024-02-02 RX ADMIN — SODIUM CHLORIDE, PRESERVATIVE FREE 10 ML: 5 INJECTION INTRAVENOUS at 22:01

## 2024-02-02 RX ADMIN — SEVELAMER CARBONATE 1600 MG: 800 TABLET, FILM COATED ORAL at 08:27

## 2024-02-02 RX ADMIN — SODIUM BICARBONATE 1300 MG: 650 TABLET ORAL at 21:58

## 2024-02-02 RX ADMIN — CYCLOBENZAPRINE 5 MG: 10 TABLET, FILM COATED ORAL at 14:50

## 2024-02-02 RX ADMIN — BUMETANIDE 2 MG: 1 TABLET ORAL at 08:27

## 2024-02-02 RX ADMIN — ASPIRIN 81 MG 81 MG: 81 TABLET ORAL at 08:27

## 2024-02-02 RX ADMIN — BUSPIRONE HYDROCHLORIDE 10 MG: 10 TABLET ORAL at 14:49

## 2024-02-02 RX ADMIN — ALLOPURINOL 100 MG: 100 TABLET ORAL at 08:27

## 2024-02-02 RX ADMIN — SEVELAMER CARBONATE 1600 MG: 800 TABLET, FILM COATED ORAL at 18:36

## 2024-02-02 ASSESSMENT — PAIN SCALES - GENERAL
PAINLEVEL_OUTOF10: 0
PAINLEVEL_OUTOF10: 7
PAINLEVEL_OUTOF10: 0

## 2024-02-02 ASSESSMENT — ENCOUNTER SYMPTOMS
VOMITING: 0
NAUSEA: 0
ABDOMINAL PAIN: 0
SHORTNESS OF BREATH: 1

## 2024-02-02 ASSESSMENT — PAIN DESCRIPTION - LOCATION: LOCATION: CHEST

## 2024-02-02 NOTE — PLAN OF CARE
Problem: Discharge Planning  Goal: Discharge to home or other facility with appropriate resources  Outcome: Progressing     Problem: ABCDS Injury Assessment  Goal: Absence of physical injury  Outcome: Progressing     Problem: Pain  Goal: Verbalizes/displays adequate comfort level or baseline comfort level  Outcome: Progressing

## 2024-02-02 NOTE — ED PROVIDER NOTES
EMERGENCY DEPARTMENT ENCOUNTER    Pt Name: Estefany Garcia  MRN: [id number]  Birthdate 1960  Date of evaluation: 2/1/24  CHIEF COMPLAINT       Chief Complaint   Patient presents with    Chest Pain     HISTORY OF PRESENT ILLNESS   HPI  Recurrent chest pain that started tonight.  Radiates to her back.  Feels like her typical angina.  Took 3 nitroglycerin at home, took an aspirin prehospital with EMS.  Some moderate improvement.  Pain is improved but she still getting some mild recurrence.  Having some dyspnea.  Some nausea.  Feeling clammy.    History of coronary disease with CABG in the past and coronary artery graft stenting and coronary artery stenting.  She went to [place] for that.  Compliant with her medications.    Completed dialysis yesterday      REVIEW OF SYSTEMS     Review of Systems   All other systems reviewed and are negative.    PASTMEDICAL HISTORY     Past Medical History:   Diagnosis Date    Arthritis     Backache, unspecified     CAD (coronary artery disease)     Cerebral artery occlusion with cerebral infarction (HCC)     CHF (congestive heart failure) (HCC)     Chronic kidney disease     Coronary atherosclerosis of artery bypass graft     COVID 01/31/2022    Cramp of limb     Epistaxis 03/05/2023    Gallstones     Hemodialysis patient (HCC)     MONDAY WEDNESDAY AND FRIDAYS  /  [facility] IN [place]    Hyperlipidemia     Hypertension     Insomnia     Neuromuscular disorder (HCC)     Pneumonia     Psychiatric problem     Thyroid disease     Type II or unspecified type diabetes mellitus with renal manifestations, not stated as uncontrolled(250.40)     Type II or unspecified type diabetes mellitus without mention of complication, not stated as uncontrolled     Unspecified vitamin D deficiency      Past Problem List  Patient Active Problem List   Diagnosis Code    Atherosclerosis of coronary artery bypass graft of native heart with angina pectoris ([facility]) I25.709    Acute kidney injury superimposed on  limits   PROTIME-INR       Vitals Reviewed:    Vitals:    02/01/24 2122 02/01/24 2129 02/01/24 2301   BP:  (!) 129/51    Pulse: 65     Resp: 17  15   Temp:  98.1 °F (36.7 °C)    TempSrc:  Oral    SpO2: 98%     Weight:  110.7 kg (244 lb)    Height:  1.651 m (5' 5\")      MEDICATIONS GIVEN TO PATIENT THIS ENCOUNTER:  Orders Placed This Encounter   Medications    morphine injection 4 mg     DISCHARGE PRESCRIPTIONS:  New Prescriptions    No medications on file     PHYSICIAN CONSULTS ORDERED THIS ENCOUNTER:  IP CONSULT TO FAMILY MEDICINE  FINAL IMPRESSION      1. Chest pain, unspecified type    2. ESRD (end stage renal disease) on dialysis ([facility])          DISPOSITION/PLAN   DISPOSITION Decision To Admit 02/01/2024 10:50:07 PM      OUTPATIENT FOLLOW UP THE PATIENT:  No follow-up provider specified.    MD Willie Hernandez Wesley D, MD  02/02/24 0010

## 2024-02-02 NOTE — PROGRESS NOTES
Physical Therapy  Facility/Department: [facility]  Physical Therapy Initial Assessment    Name: Estefany Garcia  : 1960  MRN: [id number]  Date of Service: 2024    Discharge Recommendations:  Home with assist PRN   PT Equipment Recommendations  Equipment Needed: No      Patient Diagnosis(es): The primary encounter diagnosis was Chest pain, unspecified type. Diagnoses of ESRD (end stage renal disease) on dialysis ([facility]), H/O heart artery stent, and NSTEMI (non-ST elevated myocardial infarction) ([facility]) were also pertinent to this visit.  Past Medical History:  has a past medical history of Arthritis, Backache, unspecified, CAD (coronary artery disease), Cerebral artery occlusion with cerebral infarction ([facility]), CHF (congestive heart failure) ([facility]), Chronic kidney disease, Coronary atherosclerosis of artery bypass graft, COVID, Cramp of limb, Epistaxis, Gallstones, Hemodialysis patient ([facility]), Hyperlipidemia, Hypertension, Insomnia, Neuromuscular disorder ([facility]), Pneumonia, Psychiatric problem, Thyroid disease, Type II or unspecified type diabetes mellitus with renal manifestations, not stated as uncontrolled(250.40), Type II or unspecified type diabetes mellitus without mention of complication, not stated as uncontrolled, and Unspecified vitamin D deficiency.  Past Surgical History:  has a past surgical history that includes Coronary artery bypass graft (2005); Knee arthroscopy; Carpal tunnel release; Breast surgery; Tonsillectomy; Hand surgery; Ankle fracture surgery; Cholecystectomy, open (N/A); IR TUNNELED CVC PLACE WO SQ PORT/PUMP > 5 YEARS (2021); AV fistula creation (2021); Dialysis fistula creation (Left, 2021); other surgical history (2022); back surgery; Colonoscopy; eye surgery; fracture surgery; Cardiac surgery; IR TUNNELED CVC PLACE WO SQ PORT/PUMP > 5 YEARS (2023); IR TUNNELED CVC PLACE WO SQ PORT/PUMP > 5 YEARS (Right, 2023); Dialysis Catheter    Orientation  Overall Orientation Status: Within Functional Limits  Cognition  Overall Cognitive Status: WFL     Objective   O2 Device: None (Room air)           AROM RLE (degrees)  RLE AROM: WFL  AROM LLE (degrees)  LLE AROM : WFL  Strength RLE  Strength RLE: WNL  Strength LLE  Strength LLE: WNL           Bed mobility  Supine to Sit: Modified independent  Sit to Supine:  (pt left in WC to go for test)  Scooting: Modified independent  Transfers  Sit to Stand: Stand by assistance  Stand to Sit: Stand by assistance  Bed to Chair: Stand by assistance (with RW)  Comment: on/off toilet with SBA  Ambulation  Device: Rolling Walker  Assistance: Stand by assistance  Distance: 6ft + 12ft  More Ambulation?: No  Stairs/Curb  Stairs?: No     Balance  Sitting - Static: Good  Sitting - Dynamic: Good  Standing - Static: Good (with RW)  Standing - Dynamic: Good (with RW)           Therapy Time   Individual Concurrent Group Co-treatment   Time In 0844         Time Out 0900         Minutes 16                 Cristy Pierre, PT

## 2024-02-02 NOTE — CONSULTS
[facility]  In Patient Cardiology Consult             Date:   2/2/2024  Patient name: Estefany Garcia  Date of admission:  2/1/2024  9:18 PM  MRN:   [id number]  YOB: 1960    Reason for Admission: Chest pain    CHIEF COMPLAINT: Chest pain    History Obtained From: The patient and chart    HISTORY OF PRESENT ILLNESS:    This is a 63-year-old female.  She comes in complaining of chest pain  She states it was sharp.  She states was also heavy.  She states that she tried nitro.  She ended up taking 3 pills of nitro.  There is no improvement in her symptoms.  She had minimally elevated troponins, which are below her baseline, and therefore she was admitted for further cardiac workup.  She is planning to undergo dialysis today.  She denies any orthopnea, PND, lower extremity edema.  She denies any diaphoresis, nausea, vomiting, arm pain, jaw pain.  She is unable to tolerate both Imdur and Ranexa for different reasons.  Her chest pain is reproducible on my examination.    Past Medical History:    Past Medical History:   Diagnosis Date    Arthritis     Backache, unspecified     CAD (coronary artery disease)     Cerebral artery occlusion with cerebral infarction (HCC)     CHF (congestive heart failure) (HCC)     Chronic kidney disease     Coronary atherosclerosis of artery bypass graft     COVID 01/31/2022    Cramp of limb     Epistaxis 03/05/2023    Gallstones     Hemodialysis patient (HCC)     MONDAY WEDNESDAY AND FRIDAYS  /  [facility] IN [place]    Hyperlipidemia     Hypertension     Insomnia     Neuromuscular disorder (HCC)     Pneumonia     Psychiatric problem     Thyroid disease     Type II or unspecified type diabetes mellitus with renal manifestations, not stated as uncontrolled(250.40)     Type II or unspecified type diabetes mellitus without mention of complication, not stated as uncontrolled     Unspecified vitamin D deficiency          Past Surgical History:    Past Surgical History:  Tricuspid Valve: The estimated RVSP is 11 mmHg.        NM STRESS TEST WITH MYOCARDIAL PERFUSION 08/04/2023 11:16 AM, 08/04/2023  3:43 PM (Final)    Interpretation Summary    Stress Combined Conclusion: The study is most consistent with myocardial infarction. Findings suggest a high risk of cardiac events.    ECG: Resting ECG demonstrates normal sinus rhythm. Non-specific ST abnormality. Prolonged QT    Stress ECG: The Stress ECG was negative for ischemia.    Stress Test: Blood pressure demonstrated a normal response and heart rate demonstrated a normal response to stress.    FINDINGS:    [Fixed perfusion defects involve the inferior apical and mid segments, inferolateral and anterolateral walls at stress, 24% myocardium at stress.]  Perfusion scores are visually adjusted to account for artifact.    Summed stress score: [19]  Summed rest score: [19]  Summed reversibility score: [0]    Function:  End diastolic volume: [82]mL  Left ventricular ejection fraction: [66]%  Wall motion abnormalities: [Hypokinesis in the lateral]  TID score: [1.35] (scores greater than 1.39 are considered elevated for Lexiscan stress with Tc99m)    Impression  Perfusion: [Fixed perfusion defects in the lateral segments most compatible with infarct. No significant stalin-infarct ischemia.]  Function: [Hypokinesis lateral wall]  Risk stratification: [High]      11/15/23    CARDIAC PROCEDURE 11/15/2023  4:37 PM (Final)    Conclusion  RCA 80% mid stenosis reduced to 0% 3x38 mm Resolyute Page FADIA. 99% RPDA stenosis reduced to 0% 2x26 and 2x8 mm Resolute Christiano FADIA. RPL 80% stenosis reduced to 0% using 2x12 mm Resolute Page FADIA.    Signed by: Jairo Hurley MD on 11/15/2023  4:37 PM         Labs:     CBC:   Recent Labs     02/01/24  2130   WBC 6.8   HGB 12.4   HCT 37.3        BMP:   Recent Labs     02/01/24  2140      K 4.7   CO2 27   BUN 45*   CREATININE 4.2*   LABGLOM 11*   GLUCOSE 174*     BNP: No results for input(s): \"BNP\" in the

## 2024-02-02 NOTE — H&P
[facility]  Family Medicine      History & Physical              Date:   2/2/2024  Patient name:  Estefany Garcia  Date of admission:  2/1/2024  9:18 PM  MRN:   [id number]  YOB: 1960    CHIEF COMPLAINT:     Chief Complaint   Patient presents with    Chest Pain         ASSESSMENT/PLAN       Hospital Problems             Last Modified POA    * (Principal) Chest pain 2/2/2024 Yes    Renovascular hypertension 2/2/2024 Yes    S/P angioplasty with stent 2/2/2024 Yes    Depression with anxiety 2/2/2024 Yes    ESRD on hemodialysis (HCC) 2/2/2024 Yes    Type 2 diabetes mellitus with hyperglycemia, with long-term current use of insulin (HCC) 2/2/2024 Yes         Chest pain is reproducible, case discussed with Dr. Katz, echo recommended - update, echo shows normal EF and no wall motion abnormalities  Continue home dose insulin  Nephro consulted for dialysis  BP lower end of normal, pt does have midodrine PRN  Continue Effexor, Buspar  Continue bumex, coreg, atorvastatin      Full Code   ADULT DIET; Regular; 4 carb choices (60 gm/meal); No Added Salt (3-4 gm); Low Potassium (Less than 3000 mg/day); Low Phosphorus (Less than 1000 mg); 60 to 80 gm; 1200 ml   DVT:Heparin SubQ       The severity of this patient's signs and symptoms (specify Chest pain with recent stent) indicate the need for an inpatient admission.      Above plan discussed with the patient    Consultations:   Consults: IP CONSULT TO FAMILY MEDICINE  IP CONSULT TO SOCIAL WORK  IP CONSULT TO CARDIOLOGY  IP CONSULT TO NEPHROLOGY      HPI:       History Obtained From:  Patient and chart review.    The patient is a 63 y.o.  female who presented with Left sided chest pain of 1 day duration. Pt has hx of CAD, recent stent, chest pain was sharp, associated with shortness of breath. In the ER pt troponin were near baseline and flat. No significant EKG changes. Pt has hx of ESRD on HD & T2DM.     The patient was seen and examined at bedside. No

## 2024-02-02 NOTE — PROGRESS NOTES
HEMODIALYSIS POST TREATMENT NOTE    Treatment time ordered: 3.5     Actual treatment time: 3.5    UltraFiltration Goal: 2000  UltraFiltration Removed: 1500      Pre Treatment weight: 112kg   Post Treatment weight: 110.5kg  Estimated Dry Weight: 102.5    Access used:     Central Venous Catheter:          Tunneled or Non-tunneled: na           Site: ANN           Access Flow: 400-450       Internal Access:       AV Fistula or AV Graft: AVF          Site: ANN        Access Flow: 400-450        Sign and symptoms of infection: na       If YES: na    Medications or blood products given: see MAR     Chronic outpatient schedule: [facility]    Chronic outpatient unit: [facility] [place]     Summary of response to treatment: Pt tx d/c, all blood returned dialyzer mildly streaked, 1500ml removed. Needles pulled clamps applied     Explain if orders NOT met, was physician notified:trinh RODRIGUEZ flowsheet faxed to patient unit/ placed in patient chart: yes    Post assessment completed: Sandy Verdin Rn    Report given to: Octavia Chavira Rn      * Intra-treatment documented Safety Checks include the followin) Access and face visible at all times.     2) All connections and blood lines are secure with no kinks.     3) NVL alarm engaged.     4) Hemosafe device applied (if applicable).     5) No collapse of Arterial or Venous blood chambers.     6) All blood lines / pump segments in the air detectors.

## 2024-02-02 NOTE — PROGRESS NOTES
[facility]   Occupational Therapy Evaluation  Date: 24  Patient Name: Estefany Garcia       Room: 2114/2114-01  MRN: [id number]  Account: [id number]   : 1960  (63 y.o.) Gender: female     Discharge Recommendations:  The patient would benefit from additional Occupational Therapy after discharge from the facility upon return to their Home.    OT Equipment Recommendations  Equipment Needed: No  Other: Pt has AE/DME at home    Referring Practitioner: Priscila Rob MD  Diagnosis: Chest pain Additional Pertinent Hx: Per ED Note: Recurrent chest pain that started tonight.  Radiates to her back.  Feels like her typical angina.  Took 3 nitroglycerin at home, took an aspirin prehospital with EMS.  Some moderate improvement.  Pain is improved but she still getting some mild recurrence.  Having some dyspnea.  Some nausea.  Feeling clammy.    Treatment Diagnosis: Impaired self-care status    Past Medical History:  has a past medical history of Arthritis, Backache, unspecified, CAD (coronary artery disease), Cerebral artery occlusion with cerebral infarction (HCC), CHF (congestive heart failure) ([facility]), Chronic kidney disease, Coronary atherosclerosis of artery bypass graft, COVID, Cramp of limb, Epistaxis, Gallstones, Hemodialysis patient ([facility]), Hyperlipidemia, Hypertension, Insomnia, Neuromuscular disorder (HCC), Pneumonia, Psychiatric problem, Thyroid disease, Type II or unspecified type diabetes mellitus with renal manifestations, not stated as uncontrolled(250.40), Type II or unspecified type diabetes mellitus without mention of complication, not stated as uncontrolled, and Unspecified vitamin D deficiency.    Past Surgical History:   has a past surgical history that includes Coronary artery bypass graft (2005); Knee arthroscopy; Carpal tunnel release; Breast surgery; Tonsillectomy; Hand surgery; Ankle fracture surgery; Cholecystectomy, open (N/A); IR TUNNELED CVC PLACE WO SQ

## 2024-02-02 NOTE — ED TRIAGE NOTES
Mode of arrival (squad #, walk in, police, etc) : Squad        Chief complaint(s): Chest pain        Arrival Note (brief scenario, treatment PTA, etc).: Patient was at home sitting in a chair when she leaned over to pick something up and experienced some sharp chest pain. Patient is complaining of intermittent chest pain at 10/10. Patient has hx of 4 stent placements and pt is on dialysis. Pt was given 324 mg chewable Aspirin by EMS, and pt states she took 3x nitro at home prior to EMS arrival with no relief.        C= \"Have you ever felt that you should Cut down on your drinking?\"  No  A= \"Have people Annoyed you by criticizing your drinking?\"  No  G= \"Have you ever felt bad or Guilty about your drinking?\"  No  E= \"Have you ever had a drink as an Eye-opener first thing in the morning to steady your nerves or to help a hangover?\"  No      Deferred []      Reason for deferring: N/A    *If yes to two or more: probable alcohol abuse.*

## 2024-02-02 NOTE — CARE COORDINATION
Case Management Assessment  Initial Evaluation    Date/Time of Evaluation: 2/2/2024 3:28 PM  Assessment Completed by: Di Mendosa RN    If patient is discharged prior to next notation, then this note serves as note for discharge by case management.    Patient Name: Estefany Garcia                   YOB: 1960  Diagnosis: Chest pain [R07.9]  ESRD (end stage renal disease) on dialysis (HCC) [N18.6, Z99.2]  Chest pain, unspecified type [R07.9]                   Date / Time: 2/1/2024  9:18 PM    Patient Admission Status: Inpatient   Readmission Risk (Low < 19, Mod (19-27), High > 27): Readmission Risk Score: 32.9    Current PCP: Zulma Payne APRN - CNP  PCP verified by CM?        Current PCP: Zulma Payne APRN - CNP  PCP verified by CM? Yes     Chart Reviewed: Yes      History Provided by: Patient  Patient Orientation: Alert and Oriented    Patient Cognition: Alert     Hospitalization in the last 30 days (Readmission):  Yes    If yes, Readmission Assessment in CM Navigator will be completed.     Advance Directives:       Code Status: Full Code   Patient's Primary Decision Maker is: Legal Next of Kin    Primary Decision Maker: Hailey Guido - Brother/Sister - [phone]    Secondary Decision Maker: Nancy Perdomo - Brother/Sister - [phone]     Discharge Planning:     Patient lives with: Alone Type of Home: House  Primary Care Giver: Self  Patient Support Systems include: Family Members (Sisters)   Current Financial resources: Medicare, Medicaid  Current community resources: ECF/Home Care  Current services prior to admission: Durable Medical Equipment, Home Care, Other (Comment) (Dialysis, [facility], M/W/F at 10:15. Dr Darnell)            Current DME: Walker, Shower Chair, Glucometer, Cane, Wheelchair, Other (Comment) (Dex Com)            Type of Home Care services:  PT, OT, Skilled Therapy ([facility])     ADLS  Prior functional level: Independent in ADLs/IADLs, Assistance with the

## 2024-02-02 NOTE — CONSULTS
Department of Internal Medicine  Nephrology Cherri Landis MD   Consult Note    Reason for consultation: Management of hemodialysis dependent end-stage renal disease.    Consulting physician: Priscila Rob MD.    History of present illness: This is a 63 y.o. female with a significant past medical history of systemic hypertension, osteoarthritis,  coronary artery disease s/p CABG with subsequent graft stent,  s/p cerebrovascular accident, type 2 diabetes mellitus and end-stage renal disease secondary to hypertensive and diabetic nephropathy [on routine hemodialysis Monday/Wednesday/Friday at [facility] dialysis unit [facility] urinary care of Dr. Graham using left arm AV fistula], who presented to the emergency department  yesterday February 1, 2024 with sudden onset of left-sided chest pain.  She described pain as radiating today back and took 3 tablets of sublingual nitroglycerin as 1 tablet of aspirin.  During my encounter this morning, patient continues to have pain   Vital signs were stable at presentation.  EKG showed stable nonspecific ST-T changes.  Laboratory studies remarkable for serum creatinine 4.2 mg/dL.    Adhesive tape, Imdur [isosorbide nitrate], Isosorbide dinitrate, Ranexa [ranolazine], Metformin and related, Ace inhibitors, Iv dye [iodides], Nsaids, Metformin and related, and Silicone    Past Medical History:   Diagnosis Date    Arthritis     Backache, unspecified     CAD (coronary artery disease)     Cerebral artery occlusion with cerebral infarction (HCC)     CHF (congestive heart failure) (HCC)     Chronic kidney disease     Coronary atherosclerosis of artery bypass graft     COVID 01/31/2022    Cramp of limb     Epistaxis 03/05/2023    Gallstones     Hemodialysis patient (HCC)     MONDAY WEDNESDAY AND FRIDAYS  /  [facility] IN [place]    Hyperlipidemia     Hypertension     Insomnia     Neuromuscular disorder (HCC)     Pneumonia     Psychiatric problem     Thyroid disease     Type II or

## 2024-02-02 NOTE — ED NOTES
Report given to DEDE Llanos from U.   Report method by phone   The following was reviewed with receiving RN:   Current vital signs:  BP (!) 115/40   Pulse 63   Temp 98 °F (36.7 °C) (Oral)   Resp 18   Ht 1.651 m (5' 5\")   Wt 110.7 kg (244 lb)   SpO2 95%   BMI 40.60 kg/m²                MEWS Score: 1     Any medication or safety alerts were reviewed. Any pending diagnostics and notifications were also reviewed, as well as any safety concerns or issues, abnormal labs, abnormal imaging, and abnormal assessment findings. Questions were answered.

## 2024-02-02 NOTE — PROGRESS NOTES
HEMODIALYSIS PRE-TREATMENT NOTE    Patient Identifiers prior to treatment: NAME  MRN    Isolation Required: na                      Isolation Type: na       (please document if patient is being managed as a PUI/COVID-19 patient)        Hepatitis status:                           Date Drawn                             Result  Hepatitis B Surface Antigen 23     neg                     Hepatitis B Surface Antibody 3-27-23 Immune     >1000   Hepatitis B Core Antibody na na          How was Hepatitis Status verified: epic and Sentri chart     Was a copy of the labs you documented provided to facility for the patient's chart: yes    Hemodialysis orders verified: yes    Access Within normal limits ( I.e. s/s of infection,...): WNL     Pre-Assessment completed: Yes Sandy Verdin Rn    Pre-dialysis report received from: Octavia Chavira Rn                      Time: 930

## 2024-02-03 VITALS
DIASTOLIC BLOOD PRESSURE: 58 MMHG | TEMPERATURE: 97.9 F | OXYGEN SATURATION: 97 % | BODY MASS INDEX: 40.59 KG/M2 | HEART RATE: 67 BPM | HEIGHT: 65 IN | WEIGHT: 243.61 LBS | RESPIRATION RATE: 18 BRPM | SYSTOLIC BLOOD PRESSURE: 117 MMHG

## 2024-02-03 LAB
ANION GAP SERPL CALCULATED.3IONS-SCNC: 14 MMOL/L (ref 9–17)
BASOPHILS # BLD: 0 K/UL (ref 0–0.2)
BASOPHILS NFR BLD: 1 % (ref 0–2)
BUN SERPL-MCNC: 28 MG/DL (ref 8–23)
CALCIUM SERPL-MCNC: 9.6 MG/DL (ref 8.6–10.4)
CHLORIDE SERPL-SCNC: 95 MMOL/L (ref 98–107)
CO2 SERPL-SCNC: 27 MMOL/L (ref 20–31)
CREAT SERPL-MCNC: 3.6 MG/DL (ref 0.5–0.9)
EOSINOPHIL # BLD: 0.2 K/UL (ref 0–0.4)
EOSINOPHILS RELATIVE PERCENT: 3 % (ref 0–4)
ERYTHROCYTE [DISTWIDTH] IN BLOOD BY AUTOMATED COUNT: 17 % (ref 11.5–14.9)
GFR SERPL CREATININE-BSD FRML MDRD: 13 ML/MIN/1.73M2
GLUCOSE BLD-MCNC: 256 MG/DL (ref 65–105)
GLUCOSE BLD-MCNC: 309 MG/DL (ref 65–105)
GLUCOSE BLD-MCNC: 326 MG/DL (ref 65–105)
GLUCOSE SERPL-MCNC: 284 MG/DL (ref 70–99)
HCT VFR BLD AUTO: 34.4 % (ref 36–46)
HGB BLD-MCNC: 11.4 G/DL (ref 12–16)
LYMPHOCYTES NFR BLD: 1.4 K/UL (ref 1–4.8)
LYMPHOCYTES RELATIVE PERCENT: 19 % (ref 24–44)
MCH RBC QN AUTO: 35.1 PG (ref 26–34)
MCHC RBC AUTO-ENTMCNC: 33.2 G/DL (ref 31–37)
MCV RBC AUTO: 106 FL (ref 80–100)
MONOCYTES NFR BLD: 0.6 K/UL (ref 0.1–1.3)
MONOCYTES NFR BLD: 8 % (ref 1–7)
NEUTROPHILS NFR BLD: 69 % (ref 36–66)
NEUTS SEG NFR BLD: 5.4 K/UL (ref 1.3–9.1)
PLATELET # BLD AUTO: 188 K/UL (ref 150–450)
PMV BLD AUTO: 8.2 FL (ref 6–12)
POTASSIUM SERPL-SCNC: 4.4 MMOL/L (ref 3.7–5.3)
RBC # BLD AUTO: 3.24 M/UL (ref 4–5.2)
SODIUM SERPL-SCNC: 136 MMOL/L (ref 135–144)
WBC OTHER # BLD: 7.7 K/UL (ref 3.5–11)

## 2024-02-03 PROCEDURE — 82947 ASSAY GLUCOSE BLOOD QUANT: CPT

## 2024-02-03 PROCEDURE — 36415 COLL VENOUS BLD VENIPUNCTURE: CPT

## 2024-02-03 PROCEDURE — 80048 BASIC METABOLIC PNL TOTAL CA: CPT

## 2024-02-03 PROCEDURE — 99239 HOSP IP/OBS DSCHRG MGMT >30: CPT | Performed by: FAMILY MEDICINE

## 2024-02-03 PROCEDURE — 85025 COMPLETE CBC W/AUTO DIFF WBC: CPT

## 2024-02-03 PROCEDURE — 2580000003 HC RX 258: Performed by: STUDENT IN AN ORGANIZED HEALTH CARE EDUCATION/TRAINING PROGRAM

## 2024-02-03 PROCEDURE — 6370000000 HC RX 637 (ALT 250 FOR IP): Performed by: STUDENT IN AN ORGANIZED HEALTH CARE EDUCATION/TRAINING PROGRAM

## 2024-02-03 PROCEDURE — 6360000002 HC RX W HCPCS: Performed by: STUDENT IN AN ORGANIZED HEALTH CARE EDUCATION/TRAINING PROGRAM

## 2024-02-03 RX ADMIN — INSULIN LISPRO 20 UNITS: 100 INJECTION, SOLUTION INTRAVENOUS; SUBCUTANEOUS at 11:33

## 2024-02-03 RX ADMIN — SEVELAMER CARBONATE 1600 MG: 800 TABLET, FILM COATED ORAL at 16:02

## 2024-02-03 RX ADMIN — GABAPENTIN 300 MG: 300 CAPSULE ORAL at 07:56

## 2024-02-03 RX ADMIN — SODIUM BICARBONATE 1300 MG: 650 TABLET ORAL at 07:56

## 2024-02-03 RX ADMIN — BUMETANIDE 2 MG: 1 TABLET ORAL at 16:02

## 2024-02-03 RX ADMIN — BUSPIRONE HYDROCHLORIDE 10 MG: 10 TABLET ORAL at 07:57

## 2024-02-03 RX ADMIN — ALLOPURINOL 100 MG: 100 TABLET ORAL at 07:56

## 2024-02-03 RX ADMIN — SODIUM BICARBONATE 1300 MG: 650 TABLET ORAL at 13:49

## 2024-02-03 RX ADMIN — SODIUM CHLORIDE, PRESERVATIVE FREE 10 ML: 5 INJECTION INTRAVENOUS at 07:57

## 2024-02-03 RX ADMIN — SEVELAMER CARBONATE 1600 MG: 800 TABLET, FILM COATED ORAL at 07:57

## 2024-02-03 RX ADMIN — Medication 9 MG: at 00:10

## 2024-02-03 RX ADMIN — SEVELAMER CARBONATE 1600 MG: 800 TABLET, FILM COATED ORAL at 11:23

## 2024-02-03 RX ADMIN — PRASUGREL 10 MG: 10 TABLET, FILM COATED ORAL at 07:56

## 2024-02-03 RX ADMIN — BUSPIRONE HYDROCHLORIDE 10 MG: 10 TABLET ORAL at 13:50

## 2024-02-03 RX ADMIN — HEPARIN SODIUM 5000 UNITS: 5000 INJECTION INTRAVENOUS; SUBCUTANEOUS at 06:31

## 2024-02-03 RX ADMIN — INSULIN GLARGINE 70 UNITS: 100 INJECTION, SOLUTION SUBCUTANEOUS at 09:38

## 2024-02-03 RX ADMIN — ASPIRIN 81 MG 81 MG: 81 TABLET ORAL at 07:57

## 2024-02-03 RX ADMIN — CARVEDILOL 3.12 MG: 3.12 TABLET, FILM COATED ORAL at 07:56

## 2024-02-03 RX ADMIN — CARVEDILOL 3.12 MG: 3.12 TABLET, FILM COATED ORAL at 16:03

## 2024-02-03 RX ADMIN — INSULIN LISPRO 20 UNITS: 100 INJECTION, SOLUTION INTRAVENOUS; SUBCUTANEOUS at 07:55

## 2024-02-03 RX ADMIN — HEPARIN SODIUM 5000 UNITS: 5000 INJECTION INTRAVENOUS; SUBCUTANEOUS at 13:50

## 2024-02-03 RX ADMIN — GABAPENTIN 300 MG: 300 CAPSULE ORAL at 00:05

## 2024-02-03 RX ADMIN — VENLAFAXINE HYDROCHLORIDE 75 MG: 75 CAPSULE, EXTENDED RELEASE ORAL at 07:57

## 2024-02-03 RX ADMIN — BUMETANIDE 2 MG: 1 TABLET ORAL at 07:57

## 2024-02-03 NOTE — CARE COORDINATION
Writer faxed Christiano/DC med list to [address], informed of DC & to call with any questions.

## 2024-02-03 NOTE — PROGRESS NOTES
Department of Internal Medicine  Nephrology Cherri Landis MD Progress Note    Reason for consultation: Management of hemodialysis dependent end-stage renal disease.    Consulting physician: Priscila Rob MD.    Interval history: Patient was seen and examined today and she tolerated hemodialysis well yesterday.  She is currently asymptomatic and denies chest pain or shortness of breath.    History of present illness: This is a 63 y.o. female with a significant past medical history of systemic hypertension, osteoarthritis,  coronary artery disease s/p CABG with subsequent graft stent,  s/p cerebrovascular accident, type 2 diabetes mellitus and end-stage renal disease secondary to hypertensive and diabetic nephropathy [on routine hemodialysis Monday/Wednesday/Friday at [facility] dialysis unit [facility] urinary care of Dr. Graham using left arm AV fistula], who presented to the emergency department  yesterday February 1, 2024 with sudden onset of left-sided chest pain.  She described pain as radiating today back and took 3 tablets of sublingual nitroglycerin as 1 tablet of aspirin.  During my encounter this morning, patient continues to have pain   Vital signs were stable at presentation.  EKG showed stable nonspecific ST-T changes.  Laboratory studies remarkable for serum creatinine 4.2 mg/dL.    Scheduled Meds:   sodium chloride flush  5-40 mL IntraVENous 2 times per day    heparin (porcine)  5,000 Units SubCUTAneous 3 times per day    allopurinol  100 mg Oral Daily    aspirin  81 mg Oral Daily    atorvastatin  80 mg Oral Nightly    bumetanide  2 mg Oral BID    busPIRone  10 mg Oral TID    carvedilol  3.125 mg Oral BID WC    insulin lispro  20 Units SubCUTAneous TID WC    prasugrel  10 mg Oral Daily    sevelamer  1,600 mg Oral TID WC    sodium bicarbonate  1,300 mg Oral TID    venlafaxine  75 mg Oral Daily with breakfast    insulin glargine  42 Units SubCUTAneous Nightly    insulin glargine  70 Units  SubCUTAneous QAM    gabapentin  300 mg Oral BID     Continuous Infusions:   sodium chloride      dextrose       Physical Exam:    VITALS:  BP (!) 128/42   Pulse 66   Temp 98.6 °F (37 °C) (Oral)   Resp 18   Ht 1.651 m (5' 5\")   Wt 110.5 kg (243 lb 9.7 oz)   SpO2 96%   BMI 40.54 kg/m²   TEMPERATURE:  Current - Temp: 98.6 °F (37 °C); Max - Temp  Av.3 °F (36.8 °C)  Min: 97.7 °F (36.5 °C)  Max: 98.9 °F (37.2 °C)  RESPIRATIONS RANGE: Resp  Av.6  Min: 16  Max: 18  PULSE RANGE: Pulse  Av.6  Min: 66  Max: 76  BLOOD PRESSURE RANGE:  Systolic (24hrs), Av , Min:95 , Max:140   ; Diastolic (24hrs), Av, Min:33, Max:90  PULSE OXIMETRY RANGE: SpO2  Av.5 %  Min: 90 %  Max: 98 %  24HR INTAKE/OUTPUT:    Intake/Output Summary (Last 24 hours) at 2/3/2024 1143  Last data filed at 2024 1319  Gross per 24 hour   Intake 500 ml   Output 2000 ml   Net -1500 ml       Constitutional: alert, appears stated age, and cooperative    Skin: Skin color, texture, turgor normal. No rashes or lesions    Head: Normocephalic, without obvious abnormality, atraumatic     Cardiovascular/Edema: regular rate and rhythm, S1, S2 normal, no murmur, click, rub or gallop    Respiratory: Lungs: clear to auscultation bilaterally    Abdomen: soft, non-tender; bowel sounds normal; no masses,  no organomegaly    Back: symmetric, no curvature. ROM normal. No CVA tenderness.    Extremities: extremities normal, atraumatic, no cyanosis or edema and left arm AV fistula with good bruit    Neuro:  Grossly normal    CBC:   Recent Labs     24  0445   WBC 6.8 7.7   HGB 12.4 11.4*    188       BMP:    Recent Labs     24  0445    136   K 4.7 4.4   CL 93* 95*   CO2 27 27   BUN 45* 28*   CREATININE 4.2* 3.6*   GLUCOSE 174* 284*         Lab Results   Component Value Date/Time    NITRU NEGATIVE 2023 03:47 PM    COLORU Yellow 2023 03:47 PM    PHUR 5.5 2023 03:47 PM    WBCUA TOO

## 2024-02-03 NOTE — PLAN OF CARE
Problem: Discharge Planning  Goal: Discharge to home or other facility with appropriate resources  2/3/2024 0311 by Romi Bautista RN  Outcome: Progressing     Problem: ABCDS Injury Assessment  Goal: Absence of physical injury  2/3/2024 0311 by Romi Bautista RN  Outcome: Progressing     Problem: Pain  Goal: Verbalizes/displays adequate comfort level or baseline comfort level  2/3/2024 0311 by Romi Bautista RN  Outcome: Progressing     Problem: Chronic Conditions and Co-morbidities  Goal: Patient's chronic conditions and co-morbidity symptoms are monitored and maintained or improved  2/3/2024 0311 by Romi Bautista RN  Outcome: Progressing     Problem: Safety - Adult  Goal: Free from fall injury  Outcome: Progressing

## 2024-02-03 NOTE — CARE COORDINATION
Continuity of Care Form    Patient Name: Estefany Garcia   :  1960  MRN:  [id number]    Admit date:  2024  Discharge date:  2/3/24    Code Status Order: Full Code   Advance Directives:     Admitting Physician:  Priscila Rob MD  PCP: Zulma Payne, APRN - CNP    Discharging Nurse: arline Alejoarging [facility] Unit/Room#: 2114/2114-01  Discharging Unit Phone Number: [phone]    Emergency Contact:   Extended Emergency Contact Information  Primary Emergency Contact: Hailey Guido  Home Phone: [phone]  Relation: Brother/Sister  Secondary Emergency Contact: Nancy Perdomo  Home Phone: [phone]  Relation: Brother/Sister    Past Surgical History:  Past Surgical History:   Procedure Laterality Date    ANKLE FRACTURE SURGERY      AV FISTULA CREATION  2021    BACK SURGERY      BREAST SURGERY      CARDIAC CATHETERIZATION      MVD  /  CT CONSULT    CARDIAC PROCEDURE N/A 2023    talgenie / Coronary angiography / op [facility] performed by Jairo Ceja MD at [facility] CARDIAC CATH LAB    CARDIAC PROCEDURE N/A 11/15/2023    ron / Percutaneous coronary intervention performed by Jairo Ceja MD at [facility] CARDIAC CATH LAB    CARDIAC SURGERY      CARPAL TUNNEL RELEASE      CHOLECYSTECTOMY, OPEN N/A     COLONOSCOPY      CORONARY ANGIOPLASTY WITH STENT PLACEMENT  2023    PTCA / FADIA RCA    CORONARY ANGIOPLASTY WITH STENT PLACEMENT  2019    STENT X1 AT [facility]    CORONARY ANGIOPLASTY WITH STENT PLACEMENT  2023    DR CEJA  /  FADIA SVG-DIAG  /  NEEDS RCA DONE    CORONARY ARTERY BYPASS GRAFT  02/2005    X3 [facility]    DIALYSIS CATHETER INSERTION Right 2023    CVC CATHETER REPLACEMENT right chest performed by Bib Marinelli MD at [facility] CVOR    DIALYSIS FISTULA CREATION Left 2021    LEFT AV FISTULA CREATION UPPER EXTREMITY performed by Jairo Singh MD at [facility] CVOR    DIALYSIS FISTULA CREATION Left 2023    LEFT AV FISTULA REVISION WITH

## 2024-02-03 NOTE — CARE COORDINATION
ONGOING DISCHARGE PLAN:    Patient is alert and oriented x4.    Spoke with patient regarding discharge plan and patient confirms that plan is still to return to home w/ [facility], [facility].    Pt's Sister's will transport her home when ready.    Writer will fax [place] med list to [facility], when completed.    Will continue to follow for additional discharge needs.    If patient is discharged prior to next notation, then this note serves as note for discharge by case management.    Electronically signed by Kaley Dee RN on 2/3/2024 at 1:39 PM

## 2024-02-03 NOTE — DISCHARGE INSTR - COC
Continuity of Care Form    Patient Name: Estefany Garcia   :  1960  MRN:  [id number]    Admit date:  2024  Discharge date:  2/3/24    Code Status Order: Full Code   Advance Directives:     Admitting Physician:  Priscila Rob MD  PCP: Zulma Payne, APRN - CNP    Discharging Nurse: arline Alejoarging Hospital Unit/Room#: 2114/2114-01  Discharging Unit Phone Number: [phone]    Emergency Contact:   Extended Emergency Contact Information  Primary Emergency Contact: Hailey Guido  Home Phone: [phone]  Relation: Brother/Sister  Secondary Emergency Contact: Nancy Perdomo  Home Phone: [phone]  Relation: Brother/Sister    Past Surgical History:  Past Surgical History:   Procedure Laterality Date    ANKLE FRACTURE SURGERY      AV FISTULA CREATION  2021    BACK SURGERY      BREAST SURGERY      CARDIAC CATHETERIZATION      MVD  /  CT CONSULT    CARDIAC PROCEDURE N/A 2023    talgenie / Coronary angiography / op [facility] performed by Jairo Ceja MD at [facility] CARDIAC CATH LAB    CARDIAC PROCEDURE N/A 11/15/2023    ron / Percutaneous coronary intervention performed by Jairo Ceja MD at [facility] CARDIAC CATH LAB    CARDIAC SURGERY      CARPAL TUNNEL RELEASE      CHOLECYSTECTOMY, OPEN N/A     COLONOSCOPY      CORONARY ANGIOPLASTY WITH STENT PLACEMENT  2023    PTCA / FADIA RCA    CORONARY ANGIOPLASTY WITH STENT PLACEMENT  2019    STENT X1 AT [facility]    CORONARY ANGIOPLASTY WITH STENT PLACEMENT  2023    DR CEJA  /  FADIA SVG-DIAG  /  NEEDS RCA DONE    CORONARY ARTERY BYPASS GRAFT  02/2005    X3 [facility]    DIALYSIS CATHETER INSERTION Right 2023    CVC CATHETER REPLACEMENT right chest performed by Bib Marinelli MD at [facility] CVOR    DIALYSIS FISTULA CREATION Left 2021    LEFT AV FISTULA CREATION UPPER EXTREMITY performed by Jairo Singh MD at [facility] CVOR    DIALYSIS FISTULA CREATION Left 2023    LEFT AV FISTULA REVISION WITH  22 Culture, Urine        MDRO (multi-drug resistant organism) 22 Culture, Urine        VRE 22 Culture, Urine        MRSA 20 Thao Harris RN        Added from external infection.    Resolved    COVID-19 22 COVID-19   22 Infection                        Nurse Assessment:  Last Vital Signs: BP (!) 117/58   Pulse 67   Temp 97.9 °F (36.6 °C) (Oral)   Resp 18   Ht 1.651 m (5' 5\")   Wt 110.5 kg (243 lb 9.7 oz)   SpO2 97%   BMI 40.54 kg/m²     Last documented pain score (0-10 scale): Pain Level: 7  Last Weight:   Wt Readings from Last 1 Encounters:   24 110.5 kg (243 lb 9.7 oz)     Mental Status:  oriented    IV Access:  - None    Nursing Mobility/ADLs:  Walking   Independent  Transfer  Independent  Bathing  Independent  Dressing  Independent  Toileting  Independent  Feeding  Independent  Med Admin  Independent  Med Delivery   whole    Wound Care Documentation and Therapy:  Puncture 23 Chest (Active)   Number of days: 296       Wound 23 Buttocks Left (Active)   Number of days: 178        Elimination:  Continence:   Bowel: Yes  Bladder: Yes  Urinary Catheter: None   Colostomy/Ileostomy/Ileal Conduit: Yes       Date of Last BM: 24    Intake/Output Summary (Last 24 hours) at 2/3/2024 1236  Last data filed at 2024 1319  Gross per 24 hour   Intake 500 ml   Output 2000 ml   Net -1500 ml     I/O last 3 completed shifts:  In: 500   Out: 2000     Safety Concerns:     None    Impairments/Disabilities:      None    Nutrition Therapy:  Current Nutrition Therapy:   - Oral Diet:  Renal    Routes of Feeding: Oral  Liquids: Thin Liquids  Daily Fluid Restriction: yes - amount 1500  Last Modified Barium Swallow with Video (Video Swallowing Test): not done    Treatments at the Time of Hospital Discharge:   Respiratory Treatments: Oxygen Therapy:  is not on home oxygen

## 2024-02-03 NOTE — PLAN OF CARE
Problem: Discharge Planning  Goal: Discharge to home or other facility with appropriate resources  Outcome: Progressing     Problem: ABCDS Injury Assessment  Goal: Absence of physical injury  Outcome: Progressing  Flowsheets (Taken 2/2/2024 1945)  Absence of Physical Injury: Implement safety measures based on patient assessment     Problem: Pain  Goal: Verbalizes/displays adequate comfort level or baseline comfort level  Outcome: Progressing     Problem: Chronic Conditions and Co-morbidities  Goal: Patient's chronic conditions and co-morbidity symptoms are monitored and maintained or improved  Outcome: Progressing

## 2024-02-03 NOTE — PROGRESS NOTES
Patient refusing telemetry. Nurse educated patient. Patient verbalizes understanding and states she wants a break.    Nurse updated clin lead patient refusing telemetry at this time.

## 2024-02-03 NOTE — PROGRESS NOTES
Writer responded to consult to see patient for prayer; patient known to writer from previous admissions; patient provided medical update; listening presence and support; welcomed prayer;     02/02/24 1945   Encounter Summary   Encounter Overview/Reason  Spiritual/Emotional Needs   Service Provided For: Patient   Referral/Consult From: Rounding;Nurse   Last Encounter  02/02/24   Complexity of Encounter Moderate   Spiritual/Emotional needs   Type Spiritual Support   Assessment/Intervention/Outcome   Assessment Coping;Hopeful;Powerlessness   Intervention Discussed illness injury and it’s impact;Active listening;Explored/Affirmed feelings, thoughts, concerns;Prayer (assurance of)/[place];Sustaining Presence/Ministry of presence   Outcome Coping;Engaged in conversation;Expressed feelings, needs, and concerns;Expressed Gratitude;Receptive

## 2024-02-03 NOTE — DISCHARGE SUMMARY
Family Medicine Discharge Summary    Estefany Garcia  :  1960  MRN:  [id number]    Admit date:  2024  Discharge date:  2/3/2024    Admitting Physician:  Priscila Rob MD    Discharge Diagnoses:    Patient Active Problem List   Diagnosis    Atherosclerosis of coronary artery bypass graft of native heart with angina pectoris ([facility])    Acute kidney injury superimposed on CKD ([facility])    Acute on chronic diastolic heart failure ([facility])    Diabetic polyneuropathy associated with type 2 diabetes mellitus ([facility])    History of coronary artery bypass graft    Iron deficiency anemia    Spinal stenosis of lumbar region with neurogenic claudication    Mixed hyperlipidemia    CKD (chronic kidney disease) stage 4, GFR 15-29 ml/min ([facility])    Type 2 diabetes 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, with long-term current use of insulin ([facility])    Obesity, Class II, BMI 35-39.9    Thyroid nodule greater than or equal to 1 cm in diameter incidentally noted on imaging study    Primary hypertension    Anemia of chronic disease    Chronic ischemic heart disease    Ischemic stroke of frontal lobe ([facility])    Morbid obesity with BMI of 40.0-44.9, adult ([facility])    Disequilibrium syndrome    Anxiety    Chronic midline low back pain with bilateral sciatica    Acute thoracic back pain    COVID    Delirium due to another medical condition    Cerebral hypoperfusion    Immature arteriovenous fistula ([facility])    History of fusion of cervical spine    Depression with anxiety    Vancomycin resistant Enterococcus UTI    ESRD on hemodialysis ([facility])    Dialysis disequilibrium syndrome    Acute cystitis without hematuria    VRE infection (vancomycin resistant Enterococcus)    Infection due to ESBL-producing Klebsiella pneumoniae    Class 2 severe obesity due to excess calories with serious comorbidity and body mass index (BMI) of 35.0 to 35.9 in adult ([facility])    Type 2 diabetes mellitus with hyperglycemia, with long-term current use of insulin  MG tablet  Commonly known as: BUMEX     * bumetanide 2 MG tablet  Commonly known as: BUMEX  Take 1 tablet by mouth 2 times daily Indications: with 1 mg tablet to make 3 mg dose Total of 3 mg     busPIRone 10 MG tablet  Commonly known as: BUSPAR  Take 1 tablet by mouth 3 times daily     carvedilol 3.125 MG tablet  Commonly known as: Coreg  Take 1 tablet by mouth 2 times daily Hold for SBP < 120     Cranberry 600 MG Tabs  Take 2 tablets by mouth at bedtime     cyclobenzaprine 5 MG tablet  Commonly known as: FLEXERIL  Take 1 tablet by mouth three times daily as needed for muscle spasm     Dexcom G6  Lily  1 each by Does not apply route 4 times daily     Dexcom G6 Sensor Misc  USE TO CHECK BLOOD SUGAR FOUR TIMES DAILY . CHANGE EVERY 10 DAYS     docusate sodium 100 MG capsule  Commonly known as: COLACE  Take 1 capsule by mouth 2 times daily     Easy Touch Pen Needles 29G X 12MM Misc  Generic drug: Insulin Pen Needle  5 each by Does not apply route daily     epoetin raphael-epbx 3000 UNIT/ML Soln injection  Commonly known as: RETACRIT  Inject 1 mL into the skin Every Monday, Wednesday, and Friday     FIBER-CAPS PO     gabapentin 300 MG capsule  Commonly known as: NEURONTIN     Lancets Misc  1 each by Does not apply route daily     lidocaine 4 % external patch  Place 1 patch onto the skin daily     lidocaine-prilocaine 2.5-2.5 % cream  Commonly known as: EMLA     Magnesium 400 MG Caps  Take 1 capsule by mouth daily     Melatonin 10 MG Tabs     midodrine 5 MG tablet  Commonly known as: PROAMATINE     nitroGLYCERIN 0.4 MG SL tablet  Commonly known as: NITROSTAT  Place 1 tablet under the tongue every 5 minutes as needed for Chest pain up to max of 3 total doses. If no relief after 1 dose, call 911.     NovoLOG FlexPen 100 UNIT/ML injection pen  Generic drug: insulin aspart  Inject 20 Units into the skin 3 times daily (before meals) Inject 20 units into the skin 3 times daily, before meals.  Additionally sliding scale

## 2024-02-05 ENCOUNTER — CARE COORDINATION (OUTPATIENT)
Dept: CASE MANAGEMENT | Age: 64
End: 2024-02-05

## 2024-02-05 DIAGNOSIS — R07.9 CHEST PAIN WITH HIGH RISK FOR CARDIAC ETIOLOGY: Primary | ICD-10-CM

## 2024-02-05 LAB
EKG ATRIAL RATE: 60 BPM
EKG P AXIS: 55 DEGREES
EKG P-R INTERVAL: 176 MS
EKG Q-T INTERVAL: 456 MS
EKG QRS DURATION: 98 MS
EKG QTC CALCULATION (BAZETT): 456 MS
EKG R AXIS: 68 DEGREES
EKG T AXIS: -24 DEGREES
EKG VENTRICULAR RATE: 60 BPM

## 2024-02-05 PROCEDURE — 1111F DSCHRG MED/CURRENT MED MERGE: CPT | Performed by: NURSE PRACTITIONER

## 2024-02-05 RX ORDER — POTASSIUM CHLORIDE 750 MG/1
10 TABLET, EXTENDED RELEASE ORAL DAILY
Qty: 30 TABLET | Refills: 0 | OUTPATIENT
Start: 2024-02-05

## 2024-02-05 NOTE — CARE COORDINATION
Care Transitions Initial Follow Up Call    Call within 2 business days of discharge: Yes    Patient Current Location:  Home: [address] [place] [id number]    Care Transition Nurse contacted the patient by telephone to perform post hospital discharge assessment. Verified name and  with patient as identifiers. Provided introduction to self, and explanation of the Care Transition Nurse role.     Patient: Estefany Garcia Patient : 1960   MRN: [id number]  Reason for Admission: Chest pain  Discharge Date: 2/3/24 RARS: Readmission Risk Score: 33.7      Last Discharge Facility       Date Complaint Diagnosis Description Type Department Provider    24 Chest Pain Chest pain, unspecified type ... ED to Hosp-Admission (Discharged) (ADMITTED) Priscila Beck MD; Abel Tapia...            Was this an external facility discharge? No Discharge Facility: [facility]    Challenges to be reviewed by the provider   Additional needs identified to be addressed with provider: Yes  7 day hospital follow up appointment               Method of communication with provider: chart routing.    Was able to contact Estefany for initial transitional outreach.  She stated that she was doing \"fine\".  She denied any further chest pain.  She then asked if writer could call in an hour.  Will re attempt as requested.    Was able to contact Estefany as requested.  She said that she was still fine.  She stated that she had all her medications and attempted to schedule PCP follow up, but no date is available for her needs .  Did say that her sister is good friends with NP and will wait for follow up call for an appointment.  She is having a procedure with vascular for her evaluation of her fistula this Thursday and does go to cardiac rehab twice a week. Was to have [facility], but she said that she can not have home care while she is going to cardiac rehab.   She is aware to make follow up with cardio for outpatient stress.  She had no

## 2024-02-05 NOTE — CARE COORDINATION
Spoke with patient.  She declined a TCM for this discharge. Provider has been notified and is ok with this.

## 2024-02-06 ENCOUNTER — HOSPITAL ENCOUNTER (OUTPATIENT)
Dept: CARDIAC REHAB | Age: 64
Setting detail: THERAPIES SERIES
Discharge: HOME | End: 2024-02-06
Payer: COMMERCIAL

## 2024-02-06 VITALS — BODY MASS INDEX: 41.12 KG/M2 | WEIGHT: 247.1 LBS

## 2024-02-06 PROCEDURE — 93798 PHYS/QHP OP CAR RHAB W/ECG: CPT

## 2024-02-06 ASSESSMENT — EXERCISE STRESS TEST
PEAK_HR: 81
PEAK_BP: 102/58
PEAK_RPE: 13
PEAK_METS: 2.2

## 2024-02-07 ENCOUNTER — ANESTHESIA EVENT (OUTPATIENT)
Dept: OPERATING ROOM | Age: 64
End: 2024-02-07
Payer: COMMERCIAL

## 2024-02-08 ENCOUNTER — ANESTHESIA (OUTPATIENT)
Dept: OPERATING ROOM | Age: 64
End: 2024-02-08
Payer: COMMERCIAL

## 2024-02-08 ENCOUNTER — HOSPITAL ENCOUNTER (OUTPATIENT)
Age: 64
Setting detail: OUTPATIENT SURGERY
Discharge: HOME | End: 2024-02-08
Attending: SURGERY | Admitting: SURGERY
Payer: COMMERCIAL

## 2024-02-08 ENCOUNTER — APPOINTMENT (OUTPATIENT)
Dept: CARDIAC REHAB | Age: 64
End: 2024-02-08
Payer: COMMERCIAL

## 2024-02-08 VITALS
RESPIRATION RATE: 19 BRPM | TEMPERATURE: 96.4 F | HEART RATE: 61 BPM | DIASTOLIC BLOOD PRESSURE: 77 MMHG | OXYGEN SATURATION: 99 % | SYSTOLIC BLOOD PRESSURE: 113 MMHG

## 2024-02-08 DIAGNOSIS — T82.590A DIALYSIS AV FISTULA MALFUNCTION (HCC): Primary | ICD-10-CM

## 2024-02-08 LAB
BUN BLD-MCNC: 42 MG/DL (ref 8–26)
CHLORIDE BLD-SCNC: 98 MMOL/L (ref 98–107)
EGFR, POC: 11 ML/MIN/1.73M2
GLUCOSE BLD-MCNC: 177 MG/DL (ref 74–100)
GLUCOSE BLD-MCNC: 91 MG/DL (ref 65–105)
HCT VFR BLD AUTO: 37 % (ref 36–46)
POC CREATININE: 4.4 MG/DL (ref 0.51–1.19)
POC HEMOGLOBIN (CALC): 12.5 G/DL (ref 12–16)
POTASSIUM BLD-SCNC: 4.3 MMOL/L (ref 3.5–4.5)
SODIUM BLD-SCNC: 139 MMOL/L (ref 138–146)

## 2024-02-08 PROCEDURE — C1894 INTRO/SHEATH, NON-LASER: HCPCS | Performed by: SURGERY

## 2024-02-08 PROCEDURE — 3700000001 HC ADD 15 MINUTES (ANESTHESIA): Performed by: SURGERY

## 2024-02-08 PROCEDURE — 2500000003 HC RX 250 WO HCPCS: Performed by: ANESTHESIOLOGY

## 2024-02-08 PROCEDURE — 6360000004 HC RX CONTRAST MEDICATION: Performed by: SURGERY

## 2024-02-08 PROCEDURE — 7100000011 HC PHASE II RECOVERY - ADDTL 15 MIN: Performed by: SURGERY

## 2024-02-08 PROCEDURE — 36901 INTRO CATH DIALYSIS CIRCUIT: CPT | Performed by: SURGERY

## 2024-02-08 PROCEDURE — 6360000002 HC RX W HCPCS: Performed by: SURGERY

## 2024-02-08 PROCEDURE — 84132 ASSAY OF SERUM POTASSIUM: CPT

## 2024-02-08 PROCEDURE — 82565 ASSAY OF CREATININE: CPT

## 2024-02-08 PROCEDURE — 3700000000 HC ANESTHESIA ATTENDED CARE: Performed by: SURGERY

## 2024-02-08 PROCEDURE — 36217 PLACE CATHETER IN ARTERY: CPT | Performed by: SURGERY

## 2024-02-08 PROCEDURE — C1887 CATHETER, GUIDING: HCPCS | Performed by: SURGERY

## 2024-02-08 PROCEDURE — 6360000002 HC RX W HCPCS: Performed by: NURSE ANESTHETIST, CERTIFIED REGISTERED

## 2024-02-08 PROCEDURE — 3600000007 HC SURGERY HYBRID BASE: Performed by: SURGERY

## 2024-02-08 PROCEDURE — C1769 GUIDE WIRE: HCPCS | Performed by: SURGERY

## 2024-02-08 PROCEDURE — 2580000003 HC RX 258: Performed by: SURGERY

## 2024-02-08 PROCEDURE — A4217 STERILE WATER/SALINE, 500 ML: HCPCS | Performed by: SURGERY

## 2024-02-08 PROCEDURE — 85014 HEMATOCRIT: CPT

## 2024-02-08 PROCEDURE — 82435 ASSAY OF BLOOD CHLORIDE: CPT

## 2024-02-08 PROCEDURE — 3600000017 HC SURGERY HYBRID ADDL 15MIN: Performed by: SURGERY

## 2024-02-08 PROCEDURE — 82947 ASSAY GLUCOSE BLOOD QUANT: CPT

## 2024-02-08 PROCEDURE — 75710 ARTERY X-RAYS ARM/LEG: CPT | Performed by: SURGERY

## 2024-02-08 PROCEDURE — 2709999900 HC NON-CHARGEABLE SUPPLY: Performed by: SURGERY

## 2024-02-08 PROCEDURE — 7100000010 HC PHASE II RECOVERY - FIRST 15 MIN: Performed by: SURGERY

## 2024-02-08 PROCEDURE — 84295 ASSAY OF SERUM SODIUM: CPT

## 2024-02-08 PROCEDURE — 2500000003 HC RX 250 WO HCPCS: Performed by: SURGERY

## 2024-02-08 PROCEDURE — 84520 ASSAY OF UREA NITROGEN: CPT

## 2024-02-08 RX ORDER — SODIUM CHLORIDE 0.9 % (FLUSH) 0.9 %
5-40 SYRINGE (ML) INJECTION PRN
Status: DISCONTINUED | OUTPATIENT
Start: 2024-02-08 | End: 2024-02-08 | Stop reason: HOSPADM

## 2024-02-08 RX ORDER — DEXTROSE MONOHYDRATE 25 G/50ML
12.5 INJECTION, SOLUTION INTRAVENOUS ONCE
Status: COMPLETED | OUTPATIENT
Start: 2024-02-08 | End: 2024-02-08

## 2024-02-08 RX ORDER — SODIUM CHLORIDE 9 MG/ML
INJECTION, SOLUTION INTRAVENOUS PRN
Status: DISCONTINUED | OUTPATIENT
Start: 2024-02-08 | End: 2024-02-08 | Stop reason: HOSPADM

## 2024-02-08 RX ORDER — LIDOCAINE HYDROCHLORIDE 10 MG/ML
INJECTION, SOLUTION EPIDURAL; INFILTRATION; INTRACAUDAL; PERINEURAL PRN
Status: DISCONTINUED | OUTPATIENT
Start: 2024-02-08 | End: 2024-02-08 | Stop reason: ALTCHOICE

## 2024-02-08 RX ORDER — MIDAZOLAM HYDROCHLORIDE 1 MG/ML
INJECTION INTRAMUSCULAR; INTRAVENOUS PRN
Status: DISCONTINUED | OUTPATIENT
Start: 2024-02-08 | End: 2024-02-08 | Stop reason: SDUPTHER

## 2024-02-08 RX ORDER — ONDANSETRON 2 MG/ML
4 INJECTION INTRAMUSCULAR; INTRAVENOUS
Status: DISCONTINUED | OUTPATIENT
Start: 2024-02-08 | End: 2024-02-08 | Stop reason: HOSPADM

## 2024-02-08 RX ORDER — IODIXANOL 320 MG/ML
INJECTION, SOLUTION INTRAVASCULAR PRN
Status: DISCONTINUED | OUTPATIENT
Start: 2024-02-08 | End: 2024-02-08 | Stop reason: ALTCHOICE

## 2024-02-08 RX ORDER — IODIXANOL 320 MG/ML
INJECTION, SOLUTION INTRAVASCULAR
Status: DISCONTINUED
Start: 2024-02-08 | End: 2024-02-08 | Stop reason: HOSPADM

## 2024-02-08 RX ORDER — PROPOFOL 10 MG/ML
INJECTION, EMULSION INTRAVENOUS CONTINUOUS PRN
Status: DISCONTINUED | OUTPATIENT
Start: 2024-02-08 | End: 2024-02-08 | Stop reason: SDUPTHER

## 2024-02-08 RX ORDER — SODIUM CHLORIDE 0.9 % (FLUSH) 0.9 %
5-40 SYRINGE (ML) INJECTION EVERY 12 HOURS SCHEDULED
Status: DISCONTINUED | OUTPATIENT
Start: 2024-02-08 | End: 2024-02-08 | Stop reason: HOSPADM

## 2024-02-08 RX ORDER — FENTANYL CITRATE 50 UG/ML
INJECTION, SOLUTION INTRAMUSCULAR; INTRAVENOUS PRN
Status: DISCONTINUED | OUTPATIENT
Start: 2024-02-08 | End: 2024-02-08 | Stop reason: SDUPTHER

## 2024-02-08 RX ORDER — SODIUM CHLORIDE 9 MG/ML
INJECTION, SOLUTION INTRAVENOUS CONTINUOUS
Status: DISCONTINUED | OUTPATIENT
Start: 2024-02-08 | End: 2024-02-08 | Stop reason: HOSPADM

## 2024-02-08 RX ADMIN — FENTANYL CITRATE 50 MCG: 50 INJECTION, SOLUTION INTRAMUSCULAR; INTRAVENOUS at 13:32

## 2024-02-08 RX ADMIN — PROPOFOL 50 MCG/KG/MIN: 10 INJECTION, EMULSION INTRAVENOUS at 13:33

## 2024-02-08 RX ADMIN — MIDAZOLAM 2 MG: 1 INJECTION INTRAMUSCULAR; INTRAVENOUS at 13:32

## 2024-02-08 RX ADMIN — SODIUM CHLORIDE: 9 INJECTION, SOLUTION INTRAVENOUS at 13:25

## 2024-02-08 RX ADMIN — DEXTROSE MONOHYDRATE 12.5 G: 25 INJECTION, SOLUTION INTRAVENOUS at 12:32

## 2024-02-08 RX ADMIN — DEXTROSE MONOHYDRATE 12.5 G: 25 INJECTION, SOLUTION INTRAVENOUS at 14:59

## 2024-02-08 NOTE — PROGRESS NOTES
All discharge instructions reviewed, questions answered. Patient discharged with all belongings. Pulses obtained & unchanged. Pt ambulatory. Site clean dry and intact. No bleeding, hematoma, or bruising noted. Patient discharged via personal wheelchair. .

## 2024-02-08 NOTE — ANESTHESIA PRE PROCEDURE
Other reaction(s): rash       Problem List:    Patient Active Problem List   Diagnosis Code   • Atherosclerosis of coronary artery bypass graft of native heart with angina pectoris ([facility]) I25.709   • Acute kidney injury superimposed on CKD ([facility]) N17.9, N18.9   • Acute on chronic diastolic heart failure ([facility]) I50.33   • Diabetic polyneuropathy associated with type 2 diabetes mellitus ([facility]) E11.42   • History of coronary artery bypass graft Z95.1   • Iron deficiency anemia D50.9   • Spinal stenosis of lumbar region with neurogenic claudication M48.062   • Mixed hyperlipidemia E78.2   • CKD (chronic kidney disease) stage 4, GFR 15-29 ml/min ([facility]) N18.4   • Type 2 diabetes 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, with long-term current use of insulin ([facility]) E11.22, N18.6, Z99.2, Z79.4   • Obesity, Class II, BMI 35-39.9 E66.9   • Thyroid nodule greater than or equal to 1 cm in diameter incidentally noted on imaging study E04.1   • Primary hypertension I10   • Anemia of chronic disease D63.8   • Chronic ischemic heart disease I25.9   • Ischemic stroke of frontal lobe ([facility]) I63.9   • Morbid obesity with BMI of 40.0-44.9, adult ([facility]) E66.01, Z68.41   • Disequilibrium syndrome E87.8   • Anxiety F41.9   • Chronic midline low back pain with bilateral sciatica M54.41, M54.42, G89.29   • Acute thoracic back pain M54.6   • COVID U07.1   • Delirium due to another medical condition F05   • Cerebral hypoperfusion I67.9   • Immature arteriovenous fistula ([facility]) I77.0   • History of fusion of cervical spine Z98.1   • Depression with anxiety F41.8   • Vancomycin resistant Enterococcus UTI A49.1, Z16.21   • ESRD on hemodialysis ([facility]) N18.6, Z99.2   • Dialysis disequilibrium syndrome E87.8   • Acute cystitis without hematuria N30.00   • VRE infection (vancomycin resistant Enterococcus) A49.1, Z16.21   • Infection due to ESBL-producing Klebsiella pneumoniae A49.8, Z16.12   • Class 2 severe obesity due to excess calories with

## 2024-02-08 NOTE — PROGRESS NOTES
Received post LUE Arteriogram procedure to [facility] room 7. Assessment obtained. Restrictions reviewed with patient. Post procedure pathway initiated.  Rt. Fem  site soft , dressing dry and intact.  No hematoma noted.  Family at side.  Patient without complaints. Head of bed flat with Rt. leg straight.

## 2024-02-08 NOTE — DISCHARGE INSTRUCTIONS
DISCHARGE INSTRUCTIONS::    Home Care :  Ok to shower in 24 hours.   Discontinue band aid in AM.  Do not apply further band aids. Keep clean, dry and open to air.  No powder or lotion.  Do not soak in a pool or Hot Tub or bath tub and for one week after the test.   If there is any bleeding at the catheter site, apply firm pressure with your hands until the bleeding stops. If bleeding continues after 3 minutes call 911.   If there is any swelling or firm areas at your puncture site, this could be bleeding under the skin (hematoma), and if you have any concerns seek help immediately.   Drink plenty of fluids after the test. This will flush the x-ray dye from your system.   Return to your normal diet.    The sedative will make you sleepy. Rest until the effects have worn off.   Ask your doctor when you will be able to return to work.   Avoid lifting objects heavier than 10 pounds (gallon of Milk) for 5-7 days.  Avoid Physically demanding activities, and sexual activity for 5-7 days.   Try to change positions frequently to prevent blood clots.      Medications :      If advised by your doctor, resume taking your normal medicines. Use acetaminophen (Tylenol) for pain relief.   DO NOT STOP TAKING PLAVIX/EFFIENT UNLESS TOLD TO BY YOUR CARDIOLOGIST  Do not take metformin (Glucophage) or glyburide and metformin (Glucovance) for 48 hours after the test.        Call Your Doctor If Any of the Following Occurs :  Signs of infection, including fever and chills   Redness, swelling, increasing pain, excessive bleeding, or any discharge from the insertion site     CALL 911 if you have symptoms including :  Drooping facial muscles, Changes in vision or speech   Difficulty walking or using your limbs   Change in sensation to affected leg, including numbness, feeling cold, or change in color   Extreme sweating, nausea or vomiting   Dizziness or lightheadedness. Weakness or fainting  Rapid, irregular heartbeat, Palpitations, Chest  prevent PAD. If you need help quitting, talk to your doctor about stop-smoking programs and medicines. These can increase your chances of quitting for good.  Stay at a healthy weight.  Manage other health problems, including diabetes, high blood pressure, and high cholesterol.  Be physically active. Get at least 30 minutes of exercise on most days of the week. Walking is a good choice. You also may want to do other activities, such as running, swimming, cycling, or playing tennis or team sports.  Eat a variety of heart-healthy foods.  Eat fruits, vegetables, whole grains (like oatmeal), dried beans and peas, nuts and seeds, soy products (like tofu), and fat-free or low-fat dairy products.  Replace butter, margarine, and hydrogenated or partially hydrogenated oils with olive and canola oils. (Canola oil margarine without trans fat is fine.)  Replace red meat with fish, poultry, and soy protein (like tofu).  Limit processed and packaged foods like chips, crackers, and cookies.  How is PAD treated?  Your doctor may suggest ways to relieve symptoms and lower your risk of heart attack and stroke. These may include:  Quitting smoking. It's one of the most important things you can do. If you need help quitting, talk to your doctor about stop-smoking programs and medicines. These can increase your chances of quitting for good.  Eating heart-healthy foods.  Staying at a healthy weight. Lose weight if you need to.  Regular exercise (if your doctor says it's safe). Try walking, swimming, or biking for at least 30 minutes on most, if not all, days of the week. If you have leg symptoms when you exercise, your doctor might recommend a specialized exercise program that may relieve symptoms. The goal is to be able to walk farther without pain.  Medicines that help manage other problems such as high blood pressure and high cholesterol.  Medicine, such as aspirin, that prevents blood clots which could cause a heart attack or

## 2024-02-08 NOTE — INTERVAL H&P NOTE
Update History & Physical    The patient's History and Physical of January 29,  was reviewed with the patient and I examined the patient. There was no change. The surgical site was confirmed by the patient and me.     Plan: The risks, benefits, expected outcome, and alternative to the recommended procedure have been discussed with the patient. Patient understands and wants to proceed with the procedure.     Electronically signed by Bib Marinelli MD on 2/8/2024 at 1:20 PM

## 2024-02-08 NOTE — OP NOTE
identified as Estefany Garcia for left upper extremity fistulography and arteriography through the right common femoral approach.  She was given monitored anesthesia care.  Lidocaine without epinephrine was infiltrated into the skin and subcutaneous tissue overlying the right common femoral artery.  The right common femoral artery was then accessed under ultrasound guidance with an 18-gauge needle through which modified Seldinger technique was used to establish a 4 Indonesian sheath.  A wire was then placed into the aorta and passed all the way to the left subclavian artery.  It was passed out into the brachial artery.  A 150 cm angled Jj cross catheter was then placed to the distal brachial artery.  Distal brachial arteriography and fistulography in its entirety was obtained.  Images were saved.  The catheter was placed to the terminal brachial artery and ulnar and radial arteriography as well as hand arteriography was performed.  The catheter was then pulled back to the origin of the subclavian artery and subclavian arteriography as well as axillary arteriography was obtained.  Images were saved.  Fistulography was performed during these maneuvers by placing a catheter within the fistula and performing fistulography throughout the entirety of the fistula.  The catheter was removed.  The 4 Indonesian sheath was removed.  Pressure was held on the right common femoral artery for no less than 20 minutes.  There was no bleeding or hematoma at the conclusion the case.  She tolerated the procedure well.  There were no complications.    At this point I am still somewhat uncertain as to whether or not she truly has a steal.  If she continues to have symptoms then I would be compelled to go ahead with some type of revision or even a drill procedure.  I would like to give her more time to see what happens with the hand.  Clinically she has been warm and seemingly well-perfused with good capillary refill.  I cannot palpate a distal

## 2024-02-08 NOTE — PROGRESS NOTES
Patient admitted, consent signed and questions answered. Patient ready for procedure. Call light to reach with side rails up 2 of 2. B/L Groin clipped with writer and Annette RN present.  Family at bedside with patient.  History and physical need completed.

## 2024-02-09 NOTE — ANESTHESIA POSTPROCEDURE EVALUATION
Department of Anesthesiology  Postprocedure Note    Patient: Estefany Garcia  MRN: [id number]  YOB: 1960  Date of evaluation: 2/9/2024    Procedure Summary     Date: 02/08/24 Room / Location: [place] [address] / [facility]    Anesthesia Start: 1325 Anesthesia Stop: 1441    Procedure: LEFT UPPER EXTREMITY ARTERIOGRAM VIA RIGHT GROIN (Left: Groin) Diagnosis:       Steel syndrome      End stage renal disease (HCC)      (Steel syndrome [Q87.89, Q65.2, Q79.8, Q68.8, R62.52])      (End stage renal disease (HCC) [N18.6])    Surgeons: Bib Marinelli MD Responsible Provider: Jenifer Lambert MD    Anesthesia Type: MAC ASA Status: 3          Anesthesia Type: No value filed.    Rambo Phase I: Rambo Score: 8    Rambo Phase II:      Anesthesia Post Evaluation    Patient location during evaluation: PACU  Patient participation: complete - patient participated  Level of consciousness: awake and alert  Pain score: 0  Airway patency: patent  Nausea & Vomiting: no nausea and no vomiting  Cardiovascular status: hemodynamically stable  Respiratory status: acceptable  Hydration status: stable  Pain management: adequate        No notable events documented.

## 2024-02-12 RX ORDER — POTASSIUM CHLORIDE 750 MG/1
10 TABLET, EXTENDED RELEASE ORAL DAILY
Qty: 30 TABLET | Refills: 0 | OUTPATIENT
Start: 2024-02-12

## 2024-02-13 ENCOUNTER — CARE COORDINATION (OUTPATIENT)
Dept: CASE MANAGEMENT | Age: 64
End: 2024-02-13

## 2024-02-13 ENCOUNTER — HOSPITAL ENCOUNTER (OUTPATIENT)
Dept: CARDIAC REHAB | Age: 64
Setting detail: THERAPIES SERIES
Discharge: HOME | End: 2024-02-13
Payer: COMMERCIAL

## 2024-02-13 VITALS — WEIGHT: 253 LBS | BODY MASS INDEX: 42.1 KG/M2

## 2024-02-13 PROCEDURE — 93798 PHYS/QHP OP CAR RHAB W/ECG: CPT

## 2024-02-13 NOTE — CARE COORDINATION
CARDI[facility]   3/12/2024 10:00 AM [facility] CARD REHAB RM 01 [facility]   3/14/2024 10:00 AM [facility] CARD REHAB RM 01 [facility]   3/19/2024 10:00 AM [facility] CARD REHAB RM 01 [facility]   3/21/2024 10:00 AM [facility] CARD REHAB RM 01 [facility]   3/26/2024 10:00 AM STC CARD REHAB RM 01 [facility]   3/28/2024 10:00 AM STC CARD REHAB RM 01 [facility]   4/2/2024 10:00 AM STC CARD REHAB RM 01 [facility]   4/4/2024 10:00 AM STC CARD REHAB RM 01 [facility]   4/9/2024 10:00 AM [facility] CARD REHAB RM 01 [facility]   4/11/2024 10:00 AM [facility] CARD REHAB RM 01 [facility]   4/16/2024 10:00 AM [facility] CARD REHAB RM 01 [facility]   4/18/2024 10:00 AM [facility] CARD REHAB RM 01 [facility]   4/23/2024 10:00 AM [facility] CARD REHAB RM 01 [facility]   4/25/2024 10:00 AM [facility] CARD REHAB RM 01 [facility]   4/25/2024  3:00 PM Zulma Payne, APRN - [facility]   4/30/2024 10:00 AM [facility] CARD REHAB RM 01 [facility]   5/2/2024 10:00 AM [facility] CARD REHAB RM 01 [facility]   5/7/2024 10:00 AM [facility] CARD REHAB RM 01 [facility]   5/9/2024 10:00 AM [facility] CARD REHAB RM 01 [facility]   5/14/2024 10:00 AM [facility] CARD REHAB RM 01 [facility]   5/16/2024 10:00 AM [facility] CARD REHAB RM 01 [facility]   5/21/2024 10:00 AM [facility] CARD REHAB RM 01 [facility]   5/23/2024 10:00 AM [facility] CARD REHAB RM 01 [facility]   5/28/2024  9:00 AM [facility] CARD REHAB RM 01 [facility]   5/30/2024 10:00 AM [facility] CARD REHAB RM 01 [facility]     External follow up appointment(s): no    Care Transition Nurse reviewed medical action plan with patient and discussed any barriers to care and/or understanding of plan of care after discharge. Discussed appropriate site of care based on symptoms and resources available to patient

## 2024-02-15 ENCOUNTER — HOSPITAL ENCOUNTER (OUTPATIENT)
Dept: CARDIAC REHAB | Age: 64
Setting detail: THERAPIES SERIES
Discharge: HOME | End: 2024-02-15
Payer: COMMERCIAL

## 2024-02-15 VITALS — BODY MASS INDEX: 42.1 KG/M2 | WEIGHT: 253 LBS

## 2024-02-15 PROCEDURE — 93798 PHYS/QHP OP CAR RHAB W/ECG: CPT

## 2024-02-20 ENCOUNTER — HOSPITAL ENCOUNTER (OUTPATIENT)
Dept: CARDIAC REHAB | Age: 64
Setting detail: THERAPIES SERIES
Discharge: HOME | End: 2024-02-20
Payer: COMMERCIAL

## 2024-02-20 ENCOUNTER — CARE COORDINATION (OUTPATIENT)
Dept: CASE MANAGEMENT | Age: 64
End: 2024-02-20

## 2024-02-20 NOTE — CARE COORDINATION
Care Transitions Follow Up Call    Patient Current Location:  Home: [address] [facility] [address]    Care Transition Nurse contacted the patient by telephone to follow up after admission on 24.  Verified name and  with patient as identifiers.    Patient: Estefany Garcia  Patient : 1960   MRN: [id number]  Reason for Admission: Chest pain  Discharge Date: 24 RARS: Readmission Risk Score: 33.7      Needs to be reviewed by the provider   Additional needs identified to be addressed with provider: No  none             Method of communication with provider: none.    Was able to contact Estefany for transitional outreach. She stated that she was doing well.  She denied any chest pain, shortness of breath or lower extremity swelling.  She said that she did not go to rehab today, because they are moving their equipment and will resume next scheduled visit.  She said that her insurance only approved until the end of Feb, so she does not think she will be able to continue until May.  She will have home care resume after rehab is done.  She said that her fistula is working, but her hand continues with the numbness.  She will be calling the vascular surgeon and updating him.    She had no further questions or concerns.    Addressed changes since last contact:  none  Discussed follow-up appointments. If no appointment was previously scheduled, appointment scheduling offered: Yes.   Is follow up appointment scheduled within 7 days of discharge? No.    Follow Up  Future Appointments   Date Time Provider Department Center   2024  4:00 PM Rustam Katz, DO [facility]   2024 10:00 AM [facility] REHAB RM 01 [facility]   2024 10:00 AM [facility]ARD REHAB RM 01 [facility]   2024 10:00 AM [facility]ARD REHAB RM 01 [facility]   3/5/2024 10:00 AM [facility] CARD REHAB RM 01 [facility]   3/7/2024 10:00 AM ST[facility] CARD REHAB RM 01 [facility]

## 2024-02-22 ENCOUNTER — HOSPITAL ENCOUNTER (OUTPATIENT)
Dept: CARDIAC REHAB | Age: 64
Setting detail: THERAPIES SERIES
Discharge: HOME | End: 2024-02-22
Payer: COMMERCIAL

## 2024-02-22 VITALS — BODY MASS INDEX: 42 KG/M2 | WEIGHT: 252.4 LBS

## 2024-02-22 PROCEDURE — 93798 PHYS/QHP OP CAR RHAB W/ECG: CPT

## 2024-02-22 ASSESSMENT — EXERCISE STRESS TEST
PEAK_RPE: 14
PEAK_BP: 130/82
PEAK_BP: 130/82
PEAK_HR: 101
PEAK_METS: 2.4

## 2024-02-22 ASSESSMENT — EJECTION FRACTION: EF_VALUE: 50

## 2024-02-22 NOTE — FLOWSHEET NOTE
02/22/24 0955   Treatment Diagnosis   Treatment Diagnosis 1 PCI   PCI Date 11/15/23   Treatment Diagnosis 2 PCI   PCI Date 11/03/23   Referral Date 11/16/23   Significant Cardiovascular History Previous CABG;Previous myocardial infarction;History of heart failure  (HTN, HLD)   Co-morbidities Diabetes;Renal disease;Previous MI;Cerebrovascular disease;Pulmonary disease  (ERICK)   Oxygen Saturation / Titration    Stages of Change  Maintenance   Oxygen Intervention   Oxygen Use No   Individual Treatment Plan   ITP Visit Type Re-assessment   1st Date of Exercise  01/25/24   ITP Next Review Date 03/21/24   Visit #/Total Visits 8/36   EF% 50 %  (10/4/2023 ECHO)   Risk Stratification Moderate   ITP Exercise;Psychosocial;Tobacco;Nutrition;Education   Exercise    Stages of Change Action   Assisted Devices Walker;Wheelchair   Exercise Prescription   Mode Stepper;Ergometer;Treadmill;Other (comment)  (FREE WEIGHTS)   Frequency per week 2   Duration Per Session 37 MIN   Intensity METS       2.4   RPE 14   Progression INCREASED EXERCISE TIME FROM 31 MIN TO 37 MIN INCREASED MET LEVEL FROM 2.1 TO 2.4   Resistance Training Yes   Exercise Blood Pressures   Resting /62   Peak /82   Is BP WDL Yes   Exercise Activity at Home   Type WALKS AND ROM EXERCISES   Frequency DAILY   Duration WALKS 5 MIN 4X/DAY,ROM EXERCISES DAILY WHILE WATCHING TV   Resistance Training No   Exercise Education   Education Self pulse;Exercise safety;Signs/symptoms to report;RPE scale;Equipment orientation;Warm up/cool down;Physically active   Exercise Target Goal   Target Goal(s) Individual exercise RX;BP < 140/90 or < 130/80, if DM or CKD;Aerobic activity 30 + minutes/day  5 days/week   Patient Stated Exercise Goals INCREASE STRENGTH AND ENDURANCE, DECREASE NEED FOR WHEELCHAIR AND WALKER   Psychosocial   Stages of Change Maintenance   Psychosocial Intervention   Interventions No intervention indicated   Currently Taking Psychotropic Meds Yes

## 2024-02-23 ENCOUNTER — HOSPITAL ENCOUNTER (INPATIENT)
Age: 64
LOS: 1 days | Discharge: ANOTHER HOSPITAL | DRG: 313 | End: 2024-02-28
Attending: EMERGENCY MEDICINE | Admitting: FAMILY MEDICINE
Payer: COMMERCIAL

## 2024-02-23 ENCOUNTER — APPOINTMENT (OUTPATIENT)
Dept: GENERAL RADIOLOGY | Age: 64
DRG: 313 | End: 2024-02-23
Payer: COMMERCIAL

## 2024-02-23 DIAGNOSIS — Z95.820 S/P ANGIOPLASTY WITH STENT: ICD-10-CM

## 2024-02-23 DIAGNOSIS — R94.39 ABNORMAL STRESS TEST: ICD-10-CM

## 2024-02-23 DIAGNOSIS — R60.0 BILATERAL EDEMA OF LOWER EXTREMITY: ICD-10-CM

## 2024-02-23 DIAGNOSIS — R79.89 ELEVATED TROPONIN: Primary | ICD-10-CM

## 2024-02-23 DIAGNOSIS — I20.89 ANGINA AT REST: ICD-10-CM

## 2024-02-23 LAB
ALBUMIN SERPL-MCNC: 4.4 G/DL (ref 3.5–5.2)
ALP SERPL-CCNC: 141 U/L (ref 35–104)
ALT SERPL-CCNC: 23 U/L (ref 5–33)
ANION GAP SERPL CALCULATED.3IONS-SCNC: 16 MMOL/L (ref 9–17)
AST SERPL-CCNC: 29 U/L
BASOPHILS # BLD: 0 K/UL (ref 0–0.2)
BASOPHILS NFR BLD: 1 % (ref 0–2)
BILIRUB DIRECT SERPL-MCNC: 0.2 MG/DL
BILIRUB INDIRECT SERPL-MCNC: 0.5 MG/DL (ref 0–1)
BILIRUB SERPL-MCNC: 0.7 MG/DL (ref 0.3–1.2)
BNP SERPL-MCNC: 1214 PG/ML
BUN SERPL-MCNC: 37 MG/DL (ref 8–23)
CALCIUM SERPL-MCNC: 10.9 MG/DL (ref 8.6–10.4)
CHLORIDE SERPL-SCNC: 93 MMOL/L (ref 98–107)
CO2 SERPL-SCNC: 29 MMOL/L (ref 20–31)
CREAT SERPL-MCNC: 3.2 MG/DL (ref 0.5–0.9)
EOSINOPHIL # BLD: 0.2 K/UL (ref 0–0.4)
EOSINOPHILS RELATIVE PERCENT: 3 % (ref 0–4)
ERYTHROCYTE [DISTWIDTH] IN BLOOD BY AUTOMATED COUNT: 19.1 % (ref 11.5–14.9)
GFR SERPL CREATININE-BSD FRML MDRD: 15 ML/MIN/1.73M2
GLUCOSE SERPL-MCNC: 206 MG/DL (ref 70–99)
HCT VFR BLD AUTO: 36.2 % (ref 36–46)
HGB BLD-MCNC: 12.2 G/DL (ref 12–16)
LYMPHOCYTES NFR BLD: 1.3 K/UL (ref 1–4.8)
LYMPHOCYTES RELATIVE PERCENT: 19 % (ref 24–44)
MCH RBC QN AUTO: 36.1 PG (ref 26–34)
MCHC RBC AUTO-ENTMCNC: 33.8 G/DL (ref 31–37)
MCV RBC AUTO: 106.7 FL (ref 80–100)
MONOCYTES NFR BLD: 0.5 K/UL (ref 0.1–1.3)
MONOCYTES NFR BLD: 7 % (ref 1–7)
NEUTROPHILS NFR BLD: 70 % (ref 36–66)
NEUTS SEG NFR BLD: 5.1 K/UL (ref 1.3–9.1)
PLATELET # BLD AUTO: 177 K/UL (ref 150–450)
PMV BLD AUTO: 7.9 FL (ref 6–12)
POTASSIUM SERPL-SCNC: 3.5 MMOL/L (ref 3.7–5.3)
PROT SERPL-MCNC: 7.7 G/DL (ref 6.4–8.3)
RBC # BLD AUTO: 3.39 M/UL (ref 4–5.2)
SODIUM SERPL-SCNC: 138 MMOL/L (ref 135–144)
TROPONIN I SERPL HS-MCNC: 123 NG/L (ref 0–14)
WBC OTHER # BLD: 7.2 K/UL (ref 3.5–11)

## 2024-02-23 PROCEDURE — 80048 BASIC METABOLIC PNL TOTAL CA: CPT

## 2024-02-23 PROCEDURE — 71046 X-RAY EXAM CHEST 2 VIEWS: CPT

## 2024-02-23 PROCEDURE — 93005 ELECTROCARDIOGRAM TRACING: CPT

## 2024-02-23 PROCEDURE — 83880 ASSAY OF NATRIURETIC PEPTIDE: CPT

## 2024-02-23 PROCEDURE — 80076 HEPATIC FUNCTION PANEL: CPT

## 2024-02-23 PROCEDURE — 36415 COLL VENOUS BLD VENIPUNCTURE: CPT

## 2024-02-23 PROCEDURE — 6370000000 HC RX 637 (ALT 250 FOR IP)

## 2024-02-23 PROCEDURE — 99285 EMERGENCY DEPT VISIT HI MDM: CPT

## 2024-02-23 PROCEDURE — 84484 ASSAY OF TROPONIN QUANT: CPT

## 2024-02-23 PROCEDURE — 85025 COMPLETE CBC W/AUTO DIFF WBC: CPT

## 2024-02-23 RX ORDER — MIDODRINE HYDROCHLORIDE 5 MG/1
5 TABLET ORAL ONCE
Status: COMPLETED | OUTPATIENT
Start: 2024-02-23 | End: 2024-02-23

## 2024-02-23 RX ORDER — MIDODRINE HYDROCHLORIDE 5 MG/1
10 TABLET ORAL ONCE
Status: DISCONTINUED | OUTPATIENT
Start: 2024-02-23 | End: 2024-02-23

## 2024-02-23 RX ADMIN — MIDODRINE HYDROCHLORIDE 5 MG: 5 TABLET ORAL at 23:45

## 2024-02-23 ASSESSMENT — PAIN - FUNCTIONAL ASSESSMENT: PAIN_FUNCTIONAL_ASSESSMENT: 0-10

## 2024-02-23 ASSESSMENT — PAIN SCALES - GENERAL: PAINLEVEL_OUTOF10: 6

## 2024-02-23 ASSESSMENT — HEART SCORE: ECG: 1

## 2024-02-23 ASSESSMENT — PAIN DESCRIPTION - LOCATION: LOCATION: CHEST

## 2024-02-24 ENCOUNTER — APPOINTMENT (OUTPATIENT)
Dept: GENERAL RADIOLOGY | Age: 64
DRG: 313 | End: 2024-02-24
Payer: COMMERCIAL

## 2024-02-24 ENCOUNTER — APPOINTMENT (OUTPATIENT)
Dept: ULTRASOUND IMAGING | Age: 64
DRG: 313 | End: 2024-02-24
Payer: COMMERCIAL

## 2024-02-24 PROBLEM — R14.0 ABDOMINAL DISTENSION: Status: ACTIVE | Noted: 2024-02-24

## 2024-02-24 LAB
ANION GAP SERPL CALCULATED.3IONS-SCNC: 14 MMOL/L (ref 9–17)
BUN SERPL-MCNC: 40 MG/DL (ref 8–23)
CALCIUM SERPL-MCNC: 10.1 MG/DL (ref 8.6–10.4)
CHLORIDE SERPL-SCNC: 96 MMOL/L (ref 98–107)
CO2 SERPL-SCNC: 28 MMOL/L (ref 20–31)
CREAT SERPL-MCNC: 3.6 MG/DL (ref 0.5–0.9)
EST. AVERAGE GLUCOSE BLD GHB EST-MCNC: 123 MG/DL
GFR SERPL CREATININE-BSD FRML MDRD: 13 ML/MIN/1.73M2
GLUCOSE BLD-MCNC: 214 MG/DL (ref 65–105)
GLUCOSE SERPL-MCNC: 149 MG/DL (ref 70–99)
HBA1C MFR BLD: 5.9 % (ref 4–6)
POTASSIUM SERPL-SCNC: 3.7 MMOL/L (ref 3.7–5.3)
SODIUM SERPL-SCNC: 138 MMOL/L (ref 135–144)
TROPONIN I SERPL HS-MCNC: 119 NG/L (ref 0–14)
TROPONIN I SERPL HS-MCNC: 120 NG/L (ref 0–14)
TROPONIN I SERPL HS-MCNC: 131 NG/L (ref 0–14)

## 2024-02-24 PROCEDURE — 6370000000 HC RX 637 (ALT 250 FOR IP): Performed by: FAMILY MEDICINE

## 2024-02-24 PROCEDURE — 90935 HEMODIALYSIS ONE EVALUATION: CPT

## 2024-02-24 PROCEDURE — G0378 HOSPITAL OBSERVATION PER HR: HCPCS

## 2024-02-24 PROCEDURE — 74018 RADEX ABDOMEN 1 VIEW: CPT

## 2024-02-24 PROCEDURE — 6370000000 HC RX 637 (ALT 250 FOR IP): Performed by: EMERGENCY MEDICINE

## 2024-02-24 PROCEDURE — 80048 BASIC METABOLIC PNL TOTAL CA: CPT

## 2024-02-24 PROCEDURE — 5A1D70Z PERFORMANCE OF URINARY FILTRATION, INTERMITTENT, LESS THAN 6 HOURS PER DAY: ICD-10-PCS | Performed by: INTERNAL MEDICINE

## 2024-02-24 PROCEDURE — 82947 ASSAY GLUCOSE BLOOD QUANT: CPT

## 2024-02-24 PROCEDURE — 76705 ECHO EXAM OF ABDOMEN: CPT

## 2024-02-24 PROCEDURE — 6360000002 HC RX W HCPCS: Performed by: FAMILY MEDICINE

## 2024-02-24 PROCEDURE — 83036 HEMOGLOBIN GLYCOSYLATED A1C: CPT

## 2024-02-24 PROCEDURE — 2580000003 HC RX 258: Performed by: FAMILY MEDICINE

## 2024-02-24 PROCEDURE — 99223 1ST HOSP IP/OBS HIGH 75: CPT | Performed by: FAMILY MEDICINE

## 2024-02-24 PROCEDURE — 96372 THER/PROPH/DIAG INJ SC/IM: CPT

## 2024-02-24 RX ORDER — ACETAMINOPHEN 650 MG/1
650 SUPPOSITORY RECTAL EVERY 6 HOURS PRN
Status: DISCONTINUED | OUTPATIENT
Start: 2024-02-24 | End: 2024-02-28 | Stop reason: HOSPADM

## 2024-02-24 RX ORDER — SODIUM CHLORIDE 0.9 % (FLUSH) 0.9 %
5-40 SYRINGE (ML) INJECTION PRN
Status: DISCONTINUED | OUTPATIENT
Start: 2024-02-24 | End: 2024-02-28 | Stop reason: HOSPADM

## 2024-02-24 RX ORDER — LANOLIN ALCOHOL/MO/W.PET/CERES
400 CREAM (GRAM) TOPICAL DAILY
Status: DISCONTINUED | OUTPATIENT
Start: 2024-02-24 | End: 2024-02-28 | Stop reason: HOSPADM

## 2024-02-24 RX ORDER — INSULIN GLARGINE 100 [IU]/ML
42 INJECTION, SOLUTION SUBCUTANEOUS NIGHTLY
Status: DISCONTINUED | OUTPATIENT
Start: 2024-02-24 | End: 2024-02-28 | Stop reason: HOSPADM

## 2024-02-24 RX ORDER — ACETAMINOPHEN 325 MG/1
650 TABLET ORAL EVERY 6 HOURS PRN
Status: DISCONTINUED | OUTPATIENT
Start: 2024-02-24 | End: 2024-02-28 | Stop reason: HOSPADM

## 2024-02-24 RX ORDER — ASPIRIN 81 MG/1
81 TABLET, CHEWABLE ORAL DAILY
Status: DISCONTINUED | OUTPATIENT
Start: 2024-02-24 | End: 2024-02-28 | Stop reason: HOSPADM

## 2024-02-24 RX ORDER — INSULIN LISPRO 100 [IU]/ML
0-4 INJECTION, SOLUTION INTRAVENOUS; SUBCUTANEOUS NIGHTLY
Status: DISCONTINUED | OUTPATIENT
Start: 2024-02-24 | End: 2024-02-28 | Stop reason: HOSPADM

## 2024-02-24 RX ORDER — BUSPIRONE HYDROCHLORIDE 10 MG/1
10 TABLET ORAL 3 TIMES DAILY
Status: DISCONTINUED | OUTPATIENT
Start: 2024-02-24 | End: 2024-02-28 | Stop reason: HOSPADM

## 2024-02-24 RX ORDER — CALCIUM POLYCARBOPHIL 625 MG 625 MG/1
1250 TABLET ORAL DAILY
Status: DISCONTINUED | OUTPATIENT
Start: 2024-02-24 | End: 2024-02-28 | Stop reason: HOSPADM

## 2024-02-24 RX ORDER — ONDANSETRON 2 MG/ML
4 INJECTION INTRAMUSCULAR; INTRAVENOUS EVERY 6 HOURS PRN
Status: DISCONTINUED | OUTPATIENT
Start: 2024-02-24 | End: 2024-02-28 | Stop reason: HOSPADM

## 2024-02-24 RX ORDER — MIDODRINE HYDROCHLORIDE 5 MG/1
5 TABLET ORAL PRN
Status: DISCONTINUED | OUTPATIENT
Start: 2024-02-24 | End: 2024-02-28 | Stop reason: HOSPADM

## 2024-02-24 RX ORDER — GABAPENTIN 300 MG/1
300 CAPSULE ORAL 2 TIMES DAILY
Status: DISCONTINUED | OUTPATIENT
Start: 2024-02-24 | End: 2024-02-28 | Stop reason: HOSPADM

## 2024-02-24 RX ORDER — POTASSIUM CHLORIDE 750 MG/1
10 CAPSULE, EXTENDED RELEASE ORAL 2 TIMES DAILY
Status: DISCONTINUED | OUTPATIENT
Start: 2024-02-24 | End: 2024-02-24 | Stop reason: CLARIF

## 2024-02-24 RX ORDER — VENLAFAXINE HYDROCHLORIDE 75 MG/1
75 CAPSULE, EXTENDED RELEASE ORAL
Status: DISCONTINUED | OUTPATIENT
Start: 2024-02-25 | End: 2024-02-28 | Stop reason: HOSPADM

## 2024-02-24 RX ORDER — LANOLIN ALCOHOL/MO/W.PET/CERES
10 CREAM (GRAM) TOPICAL NIGHTLY PRN
Status: DISCONTINUED | OUTPATIENT
Start: 2024-02-24 | End: 2024-02-28 | Stop reason: HOSPADM

## 2024-02-24 RX ORDER — INSULIN LISPRO 100 [IU]/ML
0-16 INJECTION, SOLUTION INTRAVENOUS; SUBCUTANEOUS
Status: DISCONTINUED | OUTPATIENT
Start: 2024-02-24 | End: 2024-02-28 | Stop reason: HOSPADM

## 2024-02-24 RX ORDER — ATORVASTATIN CALCIUM 80 MG/1
80 TABLET, FILM COATED ORAL NIGHTLY
Status: DISCONTINUED | OUTPATIENT
Start: 2024-02-24 | End: 2024-02-28 | Stop reason: HOSPADM

## 2024-02-24 RX ORDER — SODIUM BICARBONATE 650 MG/1
1300 TABLET ORAL 3 TIMES DAILY
Status: DISCONTINUED | OUTPATIENT
Start: 2024-02-24 | End: 2024-02-28 | Stop reason: HOSPADM

## 2024-02-24 RX ORDER — NITROGLYCERIN 0.4 MG/1
0.4 TABLET SUBLINGUAL EVERY 5 MIN PRN
Status: DISCONTINUED | OUTPATIENT
Start: 2024-02-24 | End: 2024-02-28 | Stop reason: HOSPADM

## 2024-02-24 RX ORDER — CARVEDILOL 3.12 MG/1
3.12 TABLET ORAL 2 TIMES DAILY
Status: DISCONTINUED | OUTPATIENT
Start: 2024-02-24 | End: 2024-02-28 | Stop reason: HOSPADM

## 2024-02-24 RX ORDER — DEXTROSE MONOHYDRATE 100 MG/ML
INJECTION, SOLUTION INTRAVENOUS CONTINUOUS PRN
Status: DISCONTINUED | OUTPATIENT
Start: 2024-02-24 | End: 2024-02-28 | Stop reason: HOSPADM

## 2024-02-24 RX ORDER — LANOLIN ALCOHOL/MO/W.PET/CERES
10 CREAM (GRAM) TOPICAL ONCE
Status: COMPLETED | OUTPATIENT
Start: 2024-02-24 | End: 2024-02-24

## 2024-02-24 RX ORDER — PRASUGREL 10 MG/1
10 TABLET, FILM COATED ORAL DAILY
Status: DISCONTINUED | OUTPATIENT
Start: 2024-02-24 | End: 2024-02-28 | Stop reason: HOSPADM

## 2024-02-24 RX ORDER — DOCUSATE SODIUM 100 MG/1
100 CAPSULE, LIQUID FILLED ORAL 2 TIMES DAILY
Status: DISCONTINUED | OUTPATIENT
Start: 2024-02-24 | End: 2024-02-28 | Stop reason: HOSPADM

## 2024-02-24 RX ORDER — SODIUM CHLORIDE 0.9 % (FLUSH) 0.9 %
5-40 SYRINGE (ML) INJECTION EVERY 12 HOURS SCHEDULED
Status: DISCONTINUED | OUTPATIENT
Start: 2024-02-24 | End: 2024-02-28 | Stop reason: HOSPADM

## 2024-02-24 RX ORDER — SODIUM CHLORIDE 9 MG/ML
INJECTION, SOLUTION INTRAVENOUS PRN
Status: DISCONTINUED | OUTPATIENT
Start: 2024-02-24 | End: 2024-02-28 | Stop reason: HOSPADM

## 2024-02-24 RX ORDER — INSULIN GLARGINE 100 [IU]/ML
72 INJECTION, SOLUTION SUBCUTANEOUS EVERY MORNING
Status: DISCONTINUED | OUTPATIENT
Start: 2024-02-24 | End: 2024-02-28 | Stop reason: HOSPADM

## 2024-02-24 RX ORDER — SEVELAMER CARBONATE 800 MG/1
1600 TABLET, FILM COATED ORAL
Status: DISCONTINUED | OUTPATIENT
Start: 2024-02-24 | End: 2024-02-28 | Stop reason: HOSPADM

## 2024-02-24 RX ORDER — POLYETHYLENE GLYCOL 3350 17 G/17G
17 POWDER, FOR SOLUTION ORAL DAILY PRN
Status: DISCONTINUED | OUTPATIENT
Start: 2024-02-24 | End: 2024-02-28 | Stop reason: HOSPADM

## 2024-02-24 RX ORDER — ALLOPURINOL 100 MG/1
100 TABLET ORAL DAILY
Status: DISCONTINUED | OUTPATIENT
Start: 2024-02-24 | End: 2024-02-28 | Stop reason: HOSPADM

## 2024-02-24 RX ORDER — INSULIN LISPRO 100 [IU]/ML
20 INJECTION, SOLUTION INTRAVENOUS; SUBCUTANEOUS
Status: DISCONTINUED | OUTPATIENT
Start: 2024-02-24 | End: 2024-02-28 | Stop reason: HOSPADM

## 2024-02-24 RX ORDER — ONDANSETRON 4 MG/1
4 TABLET, ORALLY DISINTEGRATING ORAL EVERY 8 HOURS PRN
Status: DISCONTINUED | OUTPATIENT
Start: 2024-02-24 | End: 2024-02-28 | Stop reason: HOSPADM

## 2024-02-24 RX ORDER — CYCLOBENZAPRINE HCL 10 MG
5 TABLET ORAL 3 TIMES DAILY PRN
Status: DISCONTINUED | OUTPATIENT
Start: 2024-02-24 | End: 2024-02-28 | Stop reason: HOSPADM

## 2024-02-24 RX ORDER — LIDOCAINE AND PRILOCAINE 25; 25 MG/G; MG/G
CREAM TOPICAL PRN
Status: DISCONTINUED | OUTPATIENT
Start: 2024-02-24 | End: 2024-02-28 | Stop reason: HOSPADM

## 2024-02-24 RX ORDER — LIDOCAINE 4 G/G
1 PATCH TOPICAL DAILY
Status: DISCONTINUED | OUTPATIENT
Start: 2024-02-24 | End: 2024-02-28 | Stop reason: HOSPADM

## 2024-02-24 RX ORDER — HEPARIN SODIUM 5000 [USP'U]/ML
5000 INJECTION, SOLUTION INTRAVENOUS; SUBCUTANEOUS EVERY 8 HOURS SCHEDULED
Status: DISCONTINUED | OUTPATIENT
Start: 2024-02-24 | End: 2024-02-28 | Stop reason: HOSPADM

## 2024-02-24 RX ORDER — BUMETANIDE 1 MG/1
3 TABLET ORAL 2 TIMES DAILY
Status: DISCONTINUED | OUTPATIENT
Start: 2024-02-24 | End: 2024-02-28 | Stop reason: HOSPADM

## 2024-02-24 RX ADMIN — CYCLOBENZAPRINE 5 MG: 10 TABLET, FILM COATED ORAL at 09:30

## 2024-02-24 RX ADMIN — NITROGLYCERIN 0.4 MG: 0.4 TABLET, ORALLY DISINTEGRATING SUBLINGUAL at 13:42

## 2024-02-24 RX ADMIN — INSULIN LISPRO 4 UNITS: 100 INJECTION, SOLUTION INTRAVENOUS; SUBCUTANEOUS at 09:43

## 2024-02-24 RX ADMIN — INSULIN LISPRO 20 UNITS: 100 INJECTION, SOLUTION INTRAVENOUS; SUBCUTANEOUS at 18:55

## 2024-02-24 RX ADMIN — DOCUSATE SODIUM 100 MG: 100 CAPSULE, LIQUID FILLED ORAL at 21:46

## 2024-02-24 RX ADMIN — INSULIN LISPRO 20 UNITS: 100 INJECTION, SOLUTION INTRAVENOUS; SUBCUTANEOUS at 13:50

## 2024-02-24 RX ADMIN — CALCIUM POLYCARBOPHIL 1250 MG: 625 TABLET ORAL at 13:50

## 2024-02-24 RX ADMIN — SEVELAMER CARBONATE 1600 MG: 800 TABLET, FILM COATED ORAL at 13:49

## 2024-02-24 RX ADMIN — SODIUM CHLORIDE, PRESERVATIVE FREE 10 ML: 5 INJECTION INTRAVENOUS at 21:48

## 2024-02-24 RX ADMIN — SODIUM BICARBONATE 1300 MG: 650 TABLET ORAL at 18:51

## 2024-02-24 RX ADMIN — HEPARIN SODIUM 5000 UNITS: 5000 INJECTION INTRAVENOUS; SUBCUTANEOUS at 21:46

## 2024-02-24 RX ADMIN — INSULIN LISPRO 16 UNITS: 100 INJECTION, SOLUTION INTRAVENOUS; SUBCUTANEOUS at 12:48

## 2024-02-24 RX ADMIN — GABAPENTIN 300 MG: 300 CAPSULE ORAL at 21:46

## 2024-02-24 RX ADMIN — BUSPIRONE HYDROCHLORIDE 10 MG: 10 TABLET ORAL at 21:45

## 2024-02-24 RX ADMIN — BUMETANIDE 3 MG: 1 TABLET ORAL at 09:31

## 2024-02-24 RX ADMIN — ASPIRIN 81 MG: 81 TABLET, CHEWABLE ORAL at 09:30

## 2024-02-24 RX ADMIN — CARVEDILOL 3.12 MG: 3.12 TABLET, FILM COATED ORAL at 21:45

## 2024-02-24 RX ADMIN — Medication 400 MG: at 21:46

## 2024-02-24 RX ADMIN — CARVEDILOL 3.12 MG: 3.12 TABLET, FILM COATED ORAL at 09:30

## 2024-02-24 RX ADMIN — HEPARIN SODIUM 5000 UNITS: 5000 INJECTION INTRAVENOUS; SUBCUTANEOUS at 13:50

## 2024-02-24 RX ADMIN — DOCUSATE SODIUM 100 MG: 100 CAPSULE, LIQUID FILLED ORAL at 13:50

## 2024-02-24 RX ADMIN — MIDODRINE HYDROCHLORIDE 5 MG: 5 TABLET ORAL at 16:55

## 2024-02-24 RX ADMIN — SODIUM BICARBONATE 1300 MG: 650 TABLET ORAL at 21:46

## 2024-02-24 RX ADMIN — ALLOPURINOL 100 MG: 100 TABLET ORAL at 09:30

## 2024-02-24 RX ADMIN — SEVELAMER CARBONATE 1600 MG: 800 TABLET, FILM COATED ORAL at 18:51

## 2024-02-24 RX ADMIN — BUSPIRONE HYDROCHLORIDE 10 MG: 10 TABLET ORAL at 13:50

## 2024-02-24 RX ADMIN — INSULIN GLARGINE 42 UNITS: 100 INJECTION, SOLUTION SUBCUTANEOUS at 21:47

## 2024-02-24 RX ADMIN — PRASUGREL 10 MG: 10 TABLET, FILM COATED ORAL at 13:50

## 2024-02-24 RX ADMIN — MIDODRINE HYDROCHLORIDE 5 MG: 5 TABLET ORAL at 09:30

## 2024-02-24 RX ADMIN — ATORVASTATIN CALCIUM 80 MG: 80 TABLET, FILM COATED ORAL at 21:45

## 2024-02-24 RX ADMIN — Medication 10.5 MG: at 03:02

## 2024-02-24 RX ADMIN — BUMETANIDE 3 MG: 1 TABLET ORAL at 21:45

## 2024-02-24 RX ADMIN — ACETAMINOPHEN 650 MG: 325 TABLET ORAL at 09:31

## 2024-02-24 RX ADMIN — CYCLOBENZAPRINE 5 MG: 10 TABLET, FILM COATED ORAL at 21:46

## 2024-02-24 RX ADMIN — Medication 10.5 MG: at 21:46

## 2024-02-24 RX ADMIN — GABAPENTIN 300 MG: 300 CAPSULE ORAL at 09:30

## 2024-02-24 RX ADMIN — BUSPIRONE HYDROCHLORIDE 10 MG: 10 TABLET ORAL at 09:31

## 2024-02-24 RX ADMIN — INSULIN GLARGINE 72 UNITS: 100 INJECTION, SOLUTION SUBCUTANEOUS at 09:40

## 2024-02-24 ASSESSMENT — PAIN DESCRIPTION - ORIENTATION
ORIENTATION: RIGHT;LEFT;OUTER
ORIENTATION: RIGHT;LEFT
ORIENTATION: RIGHT;LEFT;OUTER

## 2024-02-24 ASSESSMENT — PAIN SCALES - GENERAL
PAINLEVEL_OUTOF10: 5
PAINLEVEL_OUTOF10: 10
PAINLEVEL_OUTOF10: 5
PAINLEVEL_OUTOF10: 4

## 2024-02-24 ASSESSMENT — PAIN DESCRIPTION - LOCATION
LOCATION: BACK;HIP
LOCATION: ABDOMEN
LOCATION: BACK
LOCATION: ABDOMEN

## 2024-02-24 ASSESSMENT — PAIN - FUNCTIONAL ASSESSMENT: PAIN_FUNCTIONAL_ASSESSMENT: 0-10

## 2024-02-24 NOTE — CONSULTS
NEPHROLOGY CONSULT     Patient :  Estefany Garcia; 63 y.o. MRN# [id number]  Location:  2101/2101-01  Attending:  Niesha Sunshine MD  Admit Date:  2/23/2024   Hospital Day: 0      Reason for Consult: ESRD/hemodialysis    Chief Complaint: Leg swelling with fluid retention  History Obtained From:  patient    History of Present Illness:    This is a 63 y.o. female with a significant past medical history of systemic hypertension, osteoarthritis,  coronary artery disease s/p CABG with subsequent graft stent,  s/p cerebrovascular accident, type 2 diabetes mellitus and end-stage renal disease secondary to hypertensive and diabetic nephropathy [on routine hemodialysis Monday/Wednesday/Friday at [facility] dialysis unit [facility] urinary care of Dr. Graham using left arm AV fistula.  Patient presented to the hospital with complaints of fluid retention peripheral edema and abdominal distention and chest pressure   Patient having intradialytic weight gains and was advised to have 4 days a week dialysis with additional dialysis on Saturday.  Patient started liters of 112 kg  Chest x-ray showed no acute cardiopulmonary process stable mild cardiomegaly      Past Medical History:        Diagnosis Date    Arthritis     Backache, unspecified     CAD (coronary artery disease)     Cerebral artery occlusion with cerebral infarction (HCC)     CHF (congestive heart failure) (HCC)     Chronic kidney disease     Coronary atherosclerosis of artery bypass graft     COVID 01/31/2022    Cramp of limb     Epistaxis 03/05/2023    Gallstones     Hemodialysis patient (HCC)     MONDAY WEDNESDAY AND FRIDAYS  /  [facility] [place]    Hyperlipidemia     Hypertension     Insomnia     Neuromuscular disorder (HCC)     Pneumonia     Psychiatric problem     Thyroid disease     Type II or unspecified type diabetes mellitus with renal manifestations, not stated as uncontrolled(250.40)     Type II or unspecified type diabetes mellitus without mention of  complication, not stated as uncontrolled     Unspecified vitamin D deficiency        Past Surgical History:        Procedure Laterality Date    ANKLE FRACTURE SURGERY      AV FISTULA CREATION  12/14/2021    BACK SURGERY      BREAST SURGERY      CARDIAC CATHETERIZATION  2005    MVD  /  CT CONSULT    CARDIAC PROCEDURE N/A 11/03/2023    ron / Coronary angiography / op scmh performed by Jairo Ceja MD at [facility] CARDIAC CATH LAB    CARDIAC PROCEDURE N/A 11/15/2023    ron / Percutaneous coronary intervention performed by Jairo Ceja MD at [facility] CARDIAC CATH LAB    CARDIAC SURGERY      CARPAL TUNNEL RELEASE      CHOLECYSTECTOMY, OPEN N/A     COLONOSCOPY      CORONARY ANGIOPLASTY WITH STENT PLACEMENT  02/14/2023    PTCA / FADIA RCA    CORONARY ANGIOPLASTY WITH STENT PLACEMENT  2019    STENT X1 AT [facility]    CORONARY ANGIOPLASTY WITH STENT PLACEMENT  11/03/2023    DR CEJA  /  FADIA SVG-DIAG  /  NEEDS RCA DONE    CORONARY ARTERY BYPASS GRAFT  02/2005    X3 [facility]    DIALYSIS CATHETER INSERTION Right 04/13/2023    CVC CATHETER REPLACEMENT right chest performed by Bib Shin MD at [facility] CVOR    DIALYSIS FISTULA CREATION Left 12/14/2021    LEFT AV FISTULA CREATION UPPER EXTREMITY performed by Jairo Singh MD at [facility] CVOR    DIALYSIS FISTULA CREATION Left 04/19/2023    LEFT AV FISTULA REVISION WITH SUPERFICIALIZATION performed by Bib Shin MD at [facility] CVOR    EYE SURGERY      FRACTURE SURGERY      HAND SURGERY      IR TUNNELED CATHETER PLACEMENT GREATER THAN 5 YEARS  08/18/2021    IR TUNNELED CATHETER PLACEMENT GREATER THAN 5 YEARS 8/18/2021 [facility]    IR TUNNELED CATHETER PLACEMENT GREATER THAN 5 YEARS  03/03/2023    IR TUNNELED CATHETER PLACEMENT GREATER THAN 5 YEARS 3/3/2023 [facility] PROCED[facility]    IR TUNNELED CATHETER PLACEMENT GREATER THAN 5 YEARS Right 04/13/2023    DR. SHIN    IR TUNNELED CATHETER PLACEMENT GREATER THAN 5 YEARS  06/07/2023    IR

## 2024-02-24 NOTE — ED TRIAGE NOTES
Mode of arrival (squad #, walk in, police, etc) : Medic 44        Chief complaint(s): Leg swelling, bloated, Chest pain        Arrival Note (brief scenario, treatment PTA, etc).: Pt reports increased bloated abdomen and leg swelling since 6pm today. She reports that she began having CP after calling an ambulance. Pt is A&OX4.        C= \"Have you ever felt that you should Cut down on your drinking?\"  No  A= \"Have people Annoyed you by criticizing your drinking?\"  No  G= \"Have you ever felt bad or Guilty about your drinking?\"  No  E= \"Have you ever had a drink as an Eye-opener first thing in the morning to steady your nerves or to help a hangover?\"  No      Deferred []      Reason for deferring: N/A    *If yes to two or more: probable alcohol abuse.*

## 2024-02-24 NOTE — ED NOTES
Report given to DEDE Garcia from U.   Report method by phone   The following was reviewed with receiving RN:   Current vital signs:  BP (!) 114/56   Pulse 82   Temp 98.7 °F (37.1 °C) (Oral)   Resp 11   Ht 1.651 m (5' 5\")   Wt 114.3 kg (252 lb)   SpO2 98%   BMI 41.93 kg/m²                MEWS Score: 1     Any medication or safety alerts were reviewed. Any pending diagnostics and notifications were also reviewed, as well as any safety concerns or issues, abnormal labs, abnormal imaging, and abnormal assessment findings. Questions were answered.

## 2024-02-24 NOTE — ED NOTES
Report given to DEDE Call from ED.   Report method in person   The following was reviewed with receiving RN:   Current vital signs:  BP (!) 121/37   Pulse 79   Temp 99 °F (37.2 °C) (Oral)   Resp 15   Ht 1.651 m (5' 5\")   Wt 114.3 kg (252 lb)   SpO2 94%   BMI 41.93 kg/m²                MEWS Score: 2     Any medication or safety alerts were reviewed. Any pending diagnostics and notifications were also reviewed, as well as any safety concerns or issues, abnormal labs, abnormal imaging, and abnormal assessment findings. Questions were answered.

## 2024-02-24 NOTE — PROGRESS NOTES
HEMODIALYSIS PRE-TREATMENT NOTE    Patient Identifiers prior to treatment: name  mrn    Isolation Required: yes                       Isolation Type: contact       (please document if patient is being managed as a PUI/COVID-19 patient)        Hepatitis status:                           Date Drawn                             Result  Hepatitis B Surface Antigen 23     neg                     Hepatitis B Surface Antibody 3/27/23  >1000        Hepatitis B Core Antibody 3/27/23 nonreactive          How was Hepatitis Status verified: epic     Was a copy of the labs you documented provided to facility for the patient's chart: yes    Hemodialysis orders verified: yes    Access Within normal limits ( I.e. s/s of infection,...): yes     Pre-Assessment completed: yes    Pre-dialysis report received from: Jose Garcias                      Time: 6705

## 2024-02-24 NOTE — PROGRESS NOTES
The potassium sliding scale has been automatically discontinued per Pharmacy and Therapeutic Committee approved policy because the patient has decreased renal function (CrCl<30 ml/min).  The patient's current K/Mag levels are currently:    Recent Labs     02/23/24  2240 02/24/24  0710   K 3.5* 3.7       Estimated Creatinine Clearance: 20 mL/min (A) (based on SCr of 3.6 mg/dL (H)).  For patients with decreased renal function (below 30ml/min) needing potassium/magnesium supplementation, please order individual bolus doses with appropriate monitoring.      Please contact the inpatient pharmacy at [phone] with any concerns.  Thank you.    Tawana Dickinson [facility]  2/24/2024  12:58 PM

## 2024-02-24 NOTE — ED PROVIDER NOTES
[facility] ED  Emergency Department Encounter  Emergency Medicine Resident     Pt Name:Estefany Garcia  MRN: [id number]  Birthdate 1960  Date of evaluation: 2/23/24  PCP:  Zulma Payne APRN - CNP  Note Started: 10:25 PM EST      CHIEF COMPLAINT       Chief Complaint   Patient presents with    Leg Swelling    Bloated       HISTORY OF PRESENT ILLNESS  (Location/Symptom, Timing/Onset, Context/Setting, Quality, Duration, Modifying Factors, Severity.)      Estefany Garcia is a 63 y.o. female who presents with multiple complaints.  Her chief complaint is bilateral lower limb swelling as well as abdominal distention.  Patient is also complaining of chest tightness.  Patient states that she did go for dialysis today, states that she had a complete run of dialysis.  States that the lower limb swelling only started after dialysis [?],  Also complains of worsening abdominal distention since after the dialysis.  Patient does have significant cardiac history, status post CABG in 2005, status post PCI in November 2023.  Patient denying hemoptysis, denying palpitations denying shortness of breath.    Patient does receive dialysis via left AV fistula.  She denies any difficulty with accessing the fistula today.    Patient is on multiple medications including Bumex, Coreg, BuSpar, 81 mg ASA, insulin, midodrine, Effexor, and gabapentin.  Patient endorses good compliance with all these medications.    PAST MEDICAL / SURGICAL / SOCIAL / FAMILY HISTORY      has a past medical history of Arthritis, Backache, unspecified, CAD (coronary artery disease), Cerebral artery occlusion with cerebral infarction ([facility]), CHF (congestive heart failure) ([facility]), Chronic kidney disease, Coronary atherosclerosis of artery bypass graft, COVID, Cramp of limb, Epistaxis, Gallstones, Hemodialysis patient ([facility]), Hyperlipidemia, Hypertension, Insomnia, Neuromuscular disorder ([facility]), Pneumonia, Psychiatric problem, Thyroid disease, Type II or

## 2024-02-24 NOTE — H&P
Family Medicine Admit Note    PCP: Zulma Payne APRN - CNP    Date of Admission: 2/23/2024    Date of Service: Pt seen/examined on 2/24/2024 and Placed in Observation.    Chief Complaint:  abdominal bloating, leg swelling, chest tightness      History Of Present Illness:   The patient is a 63 y.o. female who presents to [facility] with above complaints. She did receive dialysis on the day of presentation.     On exam, she states her abdomen is more bloated and feels like her legs are more swollen. Last BM \"ten minutes ago.\"    Past Medical History:        Diagnosis Date    Arthritis     Backache, unspecified     CAD (coronary artery disease)     Cerebral artery occlusion with cerebral infarction (HCC)     CHF (congestive heart failure) ([facility])     Chronic kidney disease     Coronary atherosclerosis of artery bypass graft     COVID 01/31/2022    Cramp of limb     Epistaxis 03/05/2023    Gallstones     Hemodialysis patient ([facility])     MONDAY WEDNESDAY AND FRIDAYS  /  [facility] [place]    Hyperlipidemia     Hypertension     Insomnia     Neuromuscular disorder ([facility])     Pneumonia     Psychiatric problem     Thyroid disease     Type II or unspecified type diabetes mellitus with renal manifestations, not stated as uncontrolled(250.40)     Type II or unspecified type diabetes mellitus without mention of complication, not stated as uncontrolled     Unspecified vitamin D deficiency        Past Surgical History:        Procedure Laterality Date    ANKLE FRACTURE SURGERY      AV FISTULA CREATION  12/14/2021    BACK SURGERY      BREAST SURGERY      CARDIAC CATHETERIZATION  2005    MVD  /  CT CONSULT    CARDIAC PROCEDURE N/A 11/03/2023    ron / Coronary angiography / op [facility] performed by Jairo Hurley MD at [facility] CARDIAC CATH LAB    CARDIAC PROCEDURE N/A 11/15/2023    ron / Percutaneous coronary intervention performed by Jairo Hurley MD at [facility] CARDIAC CATH LAB    CARDIAC SURGERY      CARPAL TUNNEL RELEASE    Abdominal distension [R14.0] 02/24/2024    Chest pain with high risk for cardiac etiology [R07.9] 10/03/2023    ESRD (end stage renal disease) (HCC) [N18.6] 04/18/2023    Chronic midline low back pain with bilateral sciatica [M54.41, M54.42, G89.29] 09/30/2021    Chronic ischemic heart disease [I25.9] 07/23/2021    Type 2 diabetes 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, with long-term current use of insulin (HCC) [E11.22, N18.6, Z99.2, Z79.4] 09/20/2018         ASSESSMENT/PLAN:    Chest pain - cardiology consult from     ESRD - nephrology consult from     Abdominal distension - US abdomen and KUB        DVT Prophylaxis: heparin  Diet: ADULT DIET; Regular; 4 carb choices (60 gm/meal); Low Fat/Low Chol/High Fiber/JESUS MANUEL; No Caffeine  Code Status: Full Code      Dispo - admitted      Madonna Aviles MD, FAAFP  2/24/2024, 12:51 PM

## 2024-02-25 LAB
ANION GAP SERPL CALCULATED.3IONS-SCNC: 11 MMOL/L (ref 9–17)
BUN SERPL-MCNC: 32 MG/DL (ref 8–23)
CALCIUM SERPL-MCNC: 9.9 MG/DL (ref 8.6–10.4)
CHLORIDE SERPL-SCNC: 96 MMOL/L (ref 98–107)
CO2 SERPL-SCNC: 26 MMOL/L (ref 20–31)
CREAT SERPL-MCNC: 3.5 MG/DL (ref 0.5–0.9)
GFR SERPL CREATININE-BSD FRML MDRD: 14 ML/MIN/1.73M2
GLUCOSE BLD-MCNC: 247 MG/DL (ref 65–105)
GLUCOSE BLD-MCNC: 410 MG/DL (ref 65–105)
GLUCOSE SERPL-MCNC: 273 MG/DL (ref 70–99)
POTASSIUM SERPL-SCNC: 4.4 MMOL/L (ref 3.7–5.3)
SODIUM SERPL-SCNC: 133 MMOL/L (ref 135–144)

## 2024-02-25 PROCEDURE — 6370000000 HC RX 637 (ALT 250 FOR IP): Performed by: FAMILY MEDICINE

## 2024-02-25 PROCEDURE — 99232 SBSQ HOSP IP/OBS MODERATE 35: CPT | Performed by: FAMILY MEDICINE

## 2024-02-25 PROCEDURE — 6360000002 HC RX W HCPCS: Performed by: FAMILY MEDICINE

## 2024-02-25 PROCEDURE — 82947 ASSAY GLUCOSE BLOOD QUANT: CPT

## 2024-02-25 PROCEDURE — G0378 HOSPITAL OBSERVATION PER HR: HCPCS

## 2024-02-25 PROCEDURE — 97530 THERAPEUTIC ACTIVITIES: CPT

## 2024-02-25 PROCEDURE — 96372 THER/PROPH/DIAG INJ SC/IM: CPT

## 2024-02-25 PROCEDURE — 97162 PT EVAL MOD COMPLEX 30 MIN: CPT

## 2024-02-25 PROCEDURE — 36415 COLL VENOUS BLD VENIPUNCTURE: CPT

## 2024-02-25 PROCEDURE — 2580000003 HC RX 258: Performed by: FAMILY MEDICINE

## 2024-02-25 PROCEDURE — 80048 BASIC METABOLIC PNL TOTAL CA: CPT

## 2024-02-25 RX ADMIN — SEVELAMER CARBONATE 1600 MG: 800 TABLET, FILM COATED ORAL at 08:26

## 2024-02-25 RX ADMIN — DOCUSATE SODIUM 100 MG: 100 CAPSULE, LIQUID FILLED ORAL at 08:26

## 2024-02-25 RX ADMIN — CARVEDILOL 3.12 MG: 3.12 TABLET, FILM COATED ORAL at 21:08

## 2024-02-25 RX ADMIN — SODIUM BICARBONATE 1300 MG: 650 TABLET ORAL at 14:38

## 2024-02-25 RX ADMIN — ALLOPURINOL 100 MG: 100 TABLET ORAL at 08:27

## 2024-02-25 RX ADMIN — BUMETANIDE 3 MG: 1 TABLET ORAL at 08:27

## 2024-02-25 RX ADMIN — CYCLOBENZAPRINE 5 MG: 10 TABLET, FILM COATED ORAL at 08:38

## 2024-02-25 RX ADMIN — SODIUM CHLORIDE, PRESERVATIVE FREE 10 ML: 5 INJECTION INTRAVENOUS at 08:27

## 2024-02-25 RX ADMIN — INSULIN GLARGINE 42 UNITS: 100 INJECTION, SOLUTION SUBCUTANEOUS at 21:12

## 2024-02-25 RX ADMIN — BUSPIRONE HYDROCHLORIDE 10 MG: 10 TABLET ORAL at 21:08

## 2024-02-25 RX ADMIN — GABAPENTIN 300 MG: 300 CAPSULE ORAL at 08:26

## 2024-02-25 RX ADMIN — HEPARIN SODIUM 5000 UNITS: 5000 INJECTION INTRAVENOUS; SUBCUTANEOUS at 05:32

## 2024-02-25 RX ADMIN — BUSPIRONE HYDROCHLORIDE 10 MG: 10 TABLET ORAL at 08:27

## 2024-02-25 RX ADMIN — DOCUSATE SODIUM 100 MG: 100 CAPSULE, LIQUID FILLED ORAL at 21:08

## 2024-02-25 RX ADMIN — VENLAFAXINE HYDROCHLORIDE 75 MG: 75 CAPSULE, EXTENDED RELEASE ORAL at 08:26

## 2024-02-25 RX ADMIN — INSULIN LISPRO 20 UNITS: 100 INJECTION, SOLUTION INTRAVENOUS; SUBCUTANEOUS at 12:15

## 2024-02-25 RX ADMIN — HEPARIN SODIUM 5000 UNITS: 5000 INJECTION INTRAVENOUS; SUBCUTANEOUS at 21:08

## 2024-02-25 RX ADMIN — SODIUM BICARBONATE 1300 MG: 650 TABLET ORAL at 21:08

## 2024-02-25 RX ADMIN — BUSPIRONE HYDROCHLORIDE 10 MG: 10 TABLET ORAL at 14:38

## 2024-02-25 RX ADMIN — INSULIN LISPRO 4 UNITS: 100 INJECTION, SOLUTION INTRAVENOUS; SUBCUTANEOUS at 17:07

## 2024-02-25 RX ADMIN — Medication 400 MG: at 21:08

## 2024-02-25 RX ADMIN — INSULIN LISPRO 12 UNITS: 100 INJECTION, SOLUTION INTRAVENOUS; SUBCUTANEOUS at 08:23

## 2024-02-25 RX ADMIN — HEPARIN SODIUM 5000 UNITS: 5000 INJECTION INTRAVENOUS; SUBCUTANEOUS at 14:38

## 2024-02-25 RX ADMIN — SEVELAMER CARBONATE 1600 MG: 800 TABLET, FILM COATED ORAL at 12:14

## 2024-02-25 RX ADMIN — INSULIN LISPRO 20 UNITS: 100 INJECTION, SOLUTION INTRAVENOUS; SUBCUTANEOUS at 17:08

## 2024-02-25 RX ADMIN — ATORVASTATIN CALCIUM 80 MG: 80 TABLET, FILM COATED ORAL at 21:08

## 2024-02-25 RX ADMIN — INSULIN LISPRO 12 UNITS: 100 INJECTION, SOLUTION INTRAVENOUS; SUBCUTANEOUS at 12:14

## 2024-02-25 RX ADMIN — ACETAMINOPHEN 650 MG: 325 TABLET ORAL at 08:37

## 2024-02-25 RX ADMIN — SODIUM BICARBONATE 1300 MG: 650 TABLET ORAL at 08:26

## 2024-02-25 RX ADMIN — SEVELAMER CARBONATE 1600 MG: 800 TABLET, FILM COATED ORAL at 17:09

## 2024-02-25 RX ADMIN — SODIUM CHLORIDE, PRESERVATIVE FREE 10 ML: 5 INJECTION INTRAVENOUS at 21:12

## 2024-02-25 RX ADMIN — CARVEDILOL 3.12 MG: 3.12 TABLET, FILM COATED ORAL at 08:27

## 2024-02-25 RX ADMIN — ASPIRIN 81 MG: 81 TABLET, CHEWABLE ORAL at 08:26

## 2024-02-25 RX ADMIN — CALCIUM POLYCARBOPHIL 1250 MG: 625 TABLET ORAL at 08:31

## 2024-02-25 RX ADMIN — GABAPENTIN 300 MG: 300 CAPSULE ORAL at 21:08

## 2024-02-25 RX ADMIN — BUMETANIDE 3 MG: 1 TABLET ORAL at 21:08

## 2024-02-25 RX ADMIN — INSULIN LISPRO 20 UNITS: 100 INJECTION, SOLUTION INTRAVENOUS; SUBCUTANEOUS at 08:24

## 2024-02-25 RX ADMIN — PRASUGREL 10 MG: 10 TABLET, FILM COATED ORAL at 08:30

## 2024-02-25 RX ADMIN — INSULIN GLARGINE 72 UNITS: 100 INJECTION, SOLUTION SUBCUTANEOUS at 08:25

## 2024-02-25 ASSESSMENT — PAIN SCALES - GENERAL
PAINLEVEL_OUTOF10: 0
PAINLEVEL_OUTOF10: 6

## 2024-02-25 ASSESSMENT — PAIN DESCRIPTION - ORIENTATION: ORIENTATION: RIGHT;LEFT;LOWER

## 2024-02-25 ASSESSMENT — PAIN DESCRIPTION - LOCATION: LOCATION: LEG

## 2024-02-25 ASSESSMENT — PAIN DESCRIPTION - DESCRIPTORS: DESCRIPTORS: ACHING

## 2024-02-25 NOTE — PROGRESS NOTES
Progress Note  Date:2024       Room:[id number]-  Patient Name:Estefany Garcia     YOB: 1960     Age:63 y.o.        Subjective    Subjective:  Symptoms:  (Unchanged symptoms since dialysis yesterday - continues to complain of abdominal swelling and chest pressure. ).    Activity level: Impaired due to weakness.    Pain:  She complains of pain that is moderate.       Review of Systems  Objective         Vitals Last 24 Hours:  TEMPERATURE:  Temp  Av.1 °F (36.7 °C)  Min: 97.3 °F (36.3 °C)  Max: 98.9 °F (37.2 °C)  RESPIRATIONS RANGE: Resp  Av.1  Min: 11  Max: 18  PULSE OXIMETRY RANGE: SpO2  Av.6 %  Min: 91 %  Max: 100 %  PULSE RANGE: Pulse  Av.5  Min: 58  Max: 82  BLOOD PRESSURE RANGE: Systolic (24hrs), Av , Min:95 , Max:126   ; Diastolic (24hrs), Av, Min:37, Max:58    I/O (24Hr):    Intake/Output Summary (Last 24 hours) at 2024 0801  Last data filed at 2024 0535  Gross per 24 hour   Intake 1000 ml   Output 1889 ml   Net -889 ml     Objective:  General Appearance:  Comfortable.    Vital signs: (most recent): Blood pressure (!) 112/58, pulse 73, temperature 98.3 °F (36.8 °C), temperature source Oral, resp. rate 18, height 1.651 m (5' 5\"), weight 113.7 kg (250 lb 10.6 oz), SpO2 92 %.  Vital signs are normal.    Lungs:  Normal effort and normal respiratory rate.  Breath sounds clear to auscultation.    Heart: Normal rate.  Regular rhythm.  S1 normal and S2 normal.    Abdomen: Abdomen is soft.      Labs/Imaging/Diagnostics    Labs:  CBC:  Recent Labs     24  2240   WBC 7.2   RBC 3.39*   HGB 12.2   HCT 36.2   .7*   RDW 19.1*        CHEMISTRIES:  Recent Labs     24  2240 24  0710 24  0543    138 133*   K 3.5* 3.7 4.4   CL 93* 96* 96*   CO2 29 28 26   BUN 37* 40* 32*   CREATININE 3.2* 3.6* 3.5*   GLUCOSE 206* 149* 273*     PT/INR:No results for input(s): \"PROTIME\", \"INR\" in the last 72 hours.  APTT:No results for input(s):

## 2024-02-25 NOTE — PLAN OF CARE
Problem: Discharge Planning  Goal: Discharge to home or other facility with appropriate resources  Outcome: Progressing     Problem: Pain  Goal: Verbalizes/displays adequate comfort level or baseline comfort level  2/25/2024 1659 by Juan Asif RN  Outcome: Progressing     Problem: Safety - Adult  Goal: Free from fall injury  2/25/2024 1659 by Juan Asif, RN  Outcome: Progressing     Problem: ABCDS Injury Assessment  Goal: Absence of physical injury  Outcome: Progressing     Problem: Chronic Conditions and Co-morbidities  Goal: Patient's chronic conditions and co-morbidity symptoms are monitored and maintained or improved  Outcome: Progressing

## 2024-02-25 NOTE — CONSULTS
masses palpable. Normal oral mucosa  Respiratory:  Normal excursion and expansion without use of accessory muscles  Resp Auscultation: Good respiratory effort. No for increased work of breathing. On auscultation: clear to auscultation bilaterally  Cardiovascular:  The apical impulse is not displaced  Heart tones are crisp and normal. regular S1 and S2. Murmurs: None  Jugular venous pulsation Normal  The carotid upstroke is normal in amplitude and contour without delay or bruit  Peripheral pulses are symmetrical and full   Abdomen:  No masses or tenderness  Bowel sounds present  Extremities:   No Cyanosis or Clubbing   Lower extremity edema: Yes   Skin: Warm and dry  Neurological:  Alert and oriented.  Moves all extremities well  No abnormalities of mood, affect, memory, mentation, or behavior are noted    DATA:    Diagnostics:      EKG: Sinus rhythm; non-specific ST and T wave abnormality    2D ECHO ( 2/2/24)     Left Ventricle Normal left ventricular systolic function with a visually estimated EF of 55 - 60%. Left ventricle size is normal. Mild septal thickening. Normal wall motion. Abnormal diastolic function. Average E/e' ratio is 17.97.   Left Atrium Left atrium size is normal.   Interatrial Septum No interatrial shunt visualized with color Doppler.   Right Ventricle Right ventricle size is normal. Normal systolic function.   Right Atrium Right atrium size is normal.   Aortic Valve Trileaflet valve. No regurgitation. No stenosis.   Mitral Valve Valve structure is normal. No regurgitation. No stenosis noted.   Tricuspid Valve Valve structure is normal. Trace regurgitation. Normal RVSP.   Pulmonic Valve Valve structure is normal. No regurgitation.   Pulmonary Artery Pulmonary artery was not assessed.   Aorta Normal sized ascending aorta. Dilated aortic root. Ao root diameter is 2.9 cm.   IVC/Hepatic Veins IVC was not assessed.   Pericardium No pericardial effusion.          NM STRESS TEST WITH MYOCARDIAL PERFUSION

## 2024-02-25 NOTE — PROGRESS NOTES
Physical Therapy  Facility/Department: [facility] PROGRESSIVE CARE  Physical Therapy Initial Assessment    Name: Estefany Garcia  : 1960  MRN: [id number]  Date of Service: 2024    Discharge Recommendations:  Patient would benefit from continued therapy after discharge   PT Equipment Recommendations  Equipment Needed:  (TBD)      Patient Diagnosis(es): The primary encounter diagnosis was Elevated troponin. A diagnosis of Bilateral edema of lower extremity was also pertinent to this visit.  Past Medical History:  has a past medical history of Arthritis, Backache, unspecified, CAD (coronary artery disease), Cerebral artery occlusion with cerebral infarction (HCC), CHF (congestive heart failure) ([facility]), Chronic kidney disease, Coronary atherosclerosis of artery bypass graft, COVID, Cramp of limb, Epistaxis, Gallstones, Hemodialysis patient (HCC), Hyperlipidemia, Hypertension, Insomnia, Neuromuscular disorder (HCC), Pneumonia, Psychiatric problem, Thyroid disease, Type II or unspecified type diabetes mellitus with renal manifestations, not stated as uncontrolled(250.40), Type II or unspecified type diabetes mellitus without mention of complication, not stated as uncontrolled, and Unspecified vitamin D deficiency.  Past Surgical History:  has a past surgical history that includes Coronary artery bypass graft (2005); Knee arthroscopy; Carpal tunnel release; Breast surgery; Tonsillectomy; Hand surgery; Ankle fracture surgery; Cholecystectomy, open (N/A); IR TUNNELED CVC PLACE WO SQ PORT/PUMP > 5 YEARS (2021); AV fistula creation (2021); Dialysis fistula creation (Left, 2021); other surgical history (2022); back surgery; Colonoscopy; eye surgery; fracture surgery; Cardiac surgery; IR TUNNELED CVC PLACE WO SQ PORT/PUMP > 5 YEARS (2023); IR TUNNELED CVC PLACE WO SQ PORT/PUMP > 5 YEARS (Right, 2023); Dialysis Catheter Insertion (Right, 2023); other surgical history (Left,  Total  AM-PAC Inpatient Mobility Raw Score : 12  AM-PAC Inpatient T-Scale Score : 35.33  Mobility Inpatient CMS 0-100% Score: 68.66  Mobility Inpatient CMS G-Code Modifier : CL         Tinneti Score       Goals  Short Term Goals  Time Frame for Short Term Goals: 14 days  Short Term Goal 1: Pt will increase Bilat LE strength by 1/2 MMG to maximize mobility  Short Term Goal 2: Ptw ill improve standing balance with RW to good to increase safety  Short Term Goal 3: Pt will improve endurance to allow pt to ambulate household distances  Short Term Goal 4: Ptwill perform bed mobility MOD IND with HOB elevated as preferred  Short Term Goal 5: Pt will transfer with RW MOD IND  Additional Goals?: Yes  Short Term Goal 6: Pt will ambulate with RW 50 ft MOD IND       Education  Patient Education  Education Given To: Patient  Education Provided: Role of Therapy;Plan of Care;Precautions  Education Method: Verbal  Barriers to Learning: None  Education Outcome: Continued education needed      Therapy Time   Individual Concurrent Group Co-treatment   Time In 1135         Time Out 1205         Minutes 30                 Allie Tamayo, PT

## 2024-02-25 NOTE — FLOWSHEET NOTE
02/25/24 0954   Treatment Team Notification   Reason for Communication Evaluate   Type of Critical Result POC test   Critical Lab Information blood glucose   Name of Team Member Notified Dr. Aviles   Treatment Team Role Attending Provider   Method of Communication Secure Message   Response No new orders   Notification Time 0955     32 units Humalog and 72 units Lantus given at 0825    at 0917  blood glucose 410  dexcom 400    No new orders at this time.

## 2024-02-25 NOTE — PLAN OF CARE
Problem: Pain  Goal: Verbalizes/displays adequate comfort level or baseline comfort level  Outcome: Progressing  Note: Pt medicated with pain medication prn.  Assessed all pain characteristics including level, type, location, frequency, and onset.  Non-pharmacologic interventions offered to pt as well.  Pt states pain is tolerable at this time.        Problem: Safety - Adult  Goal: Free from fall injury  Outcome: Progressing  Note: Pt assessed as a fall risk this shift. Remains free from falls and accidental injury at this time. Fall precautions in place, including falling star sign and fall risk band on pt. Floor free from obstacles, and bed is locked and in lowest position. Adequate lighting provided.  Pt encouraged to call before getting OOB for any need.  Bed alarm activated. Will continue to monitor needs during hourly rounding, and reinforce education on use of call light.

## 2024-02-25 NOTE — CARE COORDINATION
Case Management Assessment  Initial Evaluation    Date/Time of Evaluation: 2/25/2024 1:24 PM  Assessment Completed by: iD Mendosa RN    If patient is discharged prior to next notation, then this note serves as note for discharge by case management.    Patient Name: Estefany Garcia                   YOB: 1960  Diagnosis: Chest pain [R07.9]  Elevated troponin [R79.89]  Bilateral edema of lower extremity [R60.0]                   Date / Time: 2/23/2024 10:10 PM    Patient Admission Status: Observation   Readmission Risk (Low < 19, Mod (19-27), High > 27): Readmission Risk Score: 33.7    Current PCP: Zulma Payne APRN - CNP  PCP verified by CM? Yes     Chart Reviewed: Yes      History Provided by: Patient  Patient Orientation: Alert and Oriented    Patient Cognition: Alert     Hospitalization in the last 30 days (Readmission):  Yes    If yes, Readmission Assessment in CM Navigator will be completed.     Advance Directives:       Code Status: Full Code   Patient's Primary Decision Maker is: Legal Next of Kin    Primary Decision Maker: Hailey Guido - Brother/Sister - [phone]    Secondary Decision Maker: Nancy Perdomo - Brother/Sister - [phone]     Discharge Planning:     Patient lives with: Alone Type of Home: House  Primary Care Giver: Self  Patient Support Systems include: Family Members (Sisters)   Current Financial resources: Medicare, Medicaid  Current community resources: ECF/Home Care  Current services prior to admission: Durable Medical Equipment, Home Care, Other (Comment) (Dialysis, [facility], M/W/F at 10:15. Dr Darnell)            Current DME: Walker, Shower Chair, Glucometer, Cane, Wheelchair, Other (Comment) (Dex Com)            Type of Home Care services:  PT, OT, Skilled Therapy ([facility])     ADLS  Prior functional level: Independent in ADLs/IADLs, Assistance with the following:, Shopping, Other (see comment) (Driving)  Current functional level: Shopping, Other (see

## 2024-02-25 NOTE — PROGRESS NOTES
HEMODIALYSIS POST TREATMENT NOTE    Treatment time ordered: 3 hours    Actual treatment time: 3 hours    UltraFiltration Goal: 2L as tolerated   UltraFiltration Removed: 1889      Pre Treatment weight: 116.6kg  Post Treatment weight: 114kg  Estimated Dry Weight: 112.5kg    Access used:     Central Venous Catheter: n/a     Internal Access: left upper arm fistula       Access Flow: good     Sign and symptoms of infection: no       If YES: n/a    Medications or blood products given: midodrine given    Regular outpatient schedule: MWF Sat    Summary of response to treatment: Patient tolerated treatment, midodrine given and decreased goal for lower blood pressure.    Explain if orders NOT met, was physician notified:no      ACES flowsheet faxed to patient unit/ placed in patient chart: yes    Post assessment completed: yes    Report given to: Jose Garcias      * Intra-treatment documented Safety Checks include the followin) Access and face visible at all times.     2) All connections and blood lines are secure with no kinks.     3) NVL alarm engaged.     4) Hemosafe device applied (if applicable).     5) No collapse of Arterial or Venous blood chambers.     6) All blood lines / pump segments in the air detectors.

## 2024-02-25 NOTE — PROGRESS NOTES
NEPHROLOGY CONSULT     Patient :  Estefany Garcia; 63 y.o. MRN# [id number]  Location:  2101/2101-01  Attending:  Niesha Sunshine MD  Admit Date:  2/23/2024   Hospital Day: 0      Reason for Consult: ESRD/hemodialysis    Chief Complaint: Leg swelling with fluid retention    Subjective/interval history.  Patient seen and examined complains of chest tightness and some shortness of breath  Patient had dialysis yesterday tolerated well.  Patient requesting to go to nursing home as she cannot take care of herself    History of Present Illness:    This is a 63 y.o. female with a significant past medical history of systemic hypertension, osteoarthritis,  coronary artery disease s/p CABG with subsequent graft stent,  s/p cerebrovascular accident, type 2 diabetes mellitus and end-stage renal disease secondary to hypertensive and diabetic nephropathy [on routine hemodialysis Monday/Wednesday/Friday at [facility] dialysis unit [facility] urinary care of Dr. Graham using left arm AV fistula.  Patient presented to the hospital with complaints of fluid retention peripheral edema and abdominal distention and chest pressure   Patient having intradialytic weight gains and was advised to have 4 days a week dialysis with additional dialysis on Saturday.  Patient started liters of 112 kg  Chest x-ray showed no acute cardiopulmonary process stable mild cardiomegaly      Past Medical History:        Diagnosis Date    Arthritis     Backache, unspecified     CAD (coronary artery disease)     Cerebral artery occlusion with cerebral infarction ([facility])     CHF (congestive heart failure) (HCC)     Chronic kidney disease     Coronary atherosclerosis of artery bypass graft     COVID 01/31/2022    Cramp of limb     Epistaxis 03/05/2023    Gallstones     Hemodialysis patient (HCC)     MONDAY WEDNESDAY AND FRIDAYS  /  [facility] IN [place]    Hyperlipidemia     Hypertension     Insomnia     Neuromuscular disorder ([facility])     Pneumonia     Psychiatric problem      °C)  MAXIMUM TEMPERATURE OVER 24HRS:  Temp (24hrs), Av.2 °F (36.8 °C), Min:97.7 °F (36.5 °C), Max:98.9 °F (37.2 °C)    CURRENT RESPIRATORY RATE:  Respirations: 18  CURRENT PULSE:  Pulse: 80  CURRENT BLOOD PRESSURE:  BP: 114/70  24HR BLOOD PRESSURE RANGE:  Systolic (24hrs), Av , Min:95 , Max:118   ; Diastolic (24hrs), Av, Min:39, Max:70    24HR INTAKE/OUTPUT:    Intake/Output Summary (Last 24 hours) at 2024 1637  Last data filed at 2024 0535  Gross per 24 hour   Intake 1000 ml   Output 1889 ml   Net -889 ml     Patient Vitals for the past 96 hrs (Last 3 readings):   Weight   24 0521 113.7 kg (250 lb 10.6 oz)   24 1822 114 kg (251 lb 5.2 oz)   24 1345 116.6 kg (257 lb 0.9 oz)       Physical Exam:  GENERAL APPEARANCE: Alert and cooperative, and appears to be in no acute distress.  HEAD: normocephalic  EYES: EOMI. Not pale, anicteric   NOSE:  No nasal discharge.    THROAT:  Oral cavity and pharynx normal. Moist  CARDIAC: Normal S1 and S2. No S3, S4 or murmurs. Rhythm is regular.  LUNGS: Clear to auscultation  without rales, rhonchi, wheezing or diminished breath sounds.  NECK: Neck supple, non-tender   GI soft distended abdomen  MUSKULOSKELETAL: Adequately aligned spine. No joint erythema or tenderness.   EXTREMITIES: 1-2+ edema.   NEURO: Nonfocal      Labs:   CBC:  Recent Labs     24  2240   WBC 7.2   RBC 3.39*   HGB 12.2   HCT 36.2   .7*   MCH 36.1*   MCHC 33.8   RDW 19.1*      MPV 7.9      BMP:   Recent Labs     24  2240 24  0710 24  0543    138 133*   K 3.5* 3.7 4.4   CL 93* 96* 96*   CO2 29 28 26   BUN 37* 40* 32*   CREATININE 3.2* 3.6* 3.5*   GLUCOSE 206* 149* 273*   CALCIUM 10.9* 10.1 9.9      Phosphorus:  No results for input(s): \"PHOS\" in the last 72 hours.  Magnesium: No results for input(s): \"MG\" in the last 72 hours.  Albumin:   Recent Labs     24  2240   LABALBU 4.4       IRON:  No results for input(s): \"IRON\" in the

## 2024-02-26 LAB
ANION GAP SERPL CALCULATED.3IONS-SCNC: 18 MMOL/L (ref 9–17)
BUN SERPL-MCNC: 49 MG/DL (ref 8–23)
CALCIUM SERPL-MCNC: 9.6 MG/DL (ref 8.6–10.4)
CHLORIDE SERPL-SCNC: 96 MMOL/L (ref 98–107)
CO2 SERPL-SCNC: 22 MMOL/L (ref 20–31)
CREAT SERPL-MCNC: 4.5 MG/DL (ref 0.5–0.9)
GFR SERPL CREATININE-BSD FRML MDRD: 10 ML/MIN/1.73M2
GLUCOSE BLD-MCNC: 185 MG/DL (ref 65–105)
GLUCOSE SERPL-MCNC: 196 MG/DL (ref 70–99)
HCT VFR BLD AUTO: 39.3 % (ref 36–46)
HGB BLD-MCNC: 12.8 G/DL (ref 12–16)
POTASSIUM SERPL-SCNC: 4.5 MMOL/L (ref 3.7–5.3)
SODIUM SERPL-SCNC: 136 MMOL/L (ref 135–144)

## 2024-02-26 PROCEDURE — 6370000000 HC RX 637 (ALT 250 FOR IP): Performed by: FAMILY MEDICINE

## 2024-02-26 PROCEDURE — 85018 HEMOGLOBIN: CPT

## 2024-02-26 PROCEDURE — 85014 HEMATOCRIT: CPT

## 2024-02-26 PROCEDURE — 80048 BASIC METABOLIC PNL TOTAL CA: CPT

## 2024-02-26 PROCEDURE — 82947 ASSAY GLUCOSE BLOOD QUANT: CPT

## 2024-02-26 PROCEDURE — 99232 SBSQ HOSP IP/OBS MODERATE 35: CPT | Performed by: FAMILY MEDICINE

## 2024-02-26 PROCEDURE — G0378 HOSPITAL OBSERVATION PER HR: HCPCS

## 2024-02-26 PROCEDURE — 96372 THER/PROPH/DIAG INJ SC/IM: CPT

## 2024-02-26 PROCEDURE — 6360000002 HC RX W HCPCS: Performed by: FAMILY MEDICINE

## 2024-02-26 PROCEDURE — 97535 SELF CARE MNGMENT TRAINING: CPT

## 2024-02-26 PROCEDURE — 36415 COLL VENOUS BLD VENIPUNCTURE: CPT

## 2024-02-26 PROCEDURE — 2580000003 HC RX 258: Performed by: FAMILY MEDICINE

## 2024-02-26 PROCEDURE — 97166 OT EVAL MOD COMPLEX 45 MIN: CPT

## 2024-02-26 PROCEDURE — 90935 HEMODIALYSIS ONE EVALUATION: CPT

## 2024-02-26 RX ORDER — METOPROLOL TARTRATE 1 MG/ML
5 INJECTION, SOLUTION INTRAVENOUS EVERY 5 MIN PRN
Status: CANCELLED | OUTPATIENT
Start: 2024-02-26 | End: 2024-02-26

## 2024-02-26 RX ORDER — ATROPINE SULFATE 0.1 MG/ML
0.5 INJECTION INTRAVENOUS EVERY 5 MIN PRN
Status: CANCELLED | OUTPATIENT
Start: 2024-02-26 | End: 2024-02-26

## 2024-02-26 RX ORDER — AMINOPHYLLINE 25 MG/ML
50 INJECTION, SOLUTION INTRAVENOUS PRN
Status: CANCELLED | OUTPATIENT
Start: 2024-02-26 | End: 2024-02-26

## 2024-02-26 RX ORDER — SODIUM CHLORIDE 9 MG/ML
500 INJECTION, SOLUTION INTRAVENOUS CONTINUOUS PRN
Status: CANCELLED | OUTPATIENT
Start: 2024-02-26 | End: 2024-02-26

## 2024-02-26 RX ORDER — SODIUM CHLORIDE 0.9 % (FLUSH) 0.9 %
5-40 SYRINGE (ML) INJECTION PRN
Status: CANCELLED | OUTPATIENT
Start: 2024-02-26 | End: 2024-02-26

## 2024-02-26 RX ORDER — ALBUTEROL SULFATE 90 UG/1
2 AEROSOL, METERED RESPIRATORY (INHALATION) PRN
Status: CANCELLED | OUTPATIENT
Start: 2024-02-26 | End: 2024-02-26

## 2024-02-26 RX ORDER — REGADENOSON 0.08 MG/ML
0.4 INJECTION, SOLUTION INTRAVENOUS
Status: CANCELLED | OUTPATIENT
Start: 2024-02-26

## 2024-02-26 RX ORDER — NITROGLYCERIN 0.4 MG/1
0.4 TABLET SUBLINGUAL EVERY 5 MIN PRN
Status: CANCELLED | OUTPATIENT
Start: 2024-02-26 | End: 2024-02-26

## 2024-02-26 RX ADMIN — ALLOPURINOL 100 MG: 100 TABLET ORAL at 14:33

## 2024-02-26 RX ADMIN — GABAPENTIN 300 MG: 300 CAPSULE ORAL at 14:33

## 2024-02-26 RX ADMIN — HEPARIN SODIUM 5000 UNITS: 5000 INJECTION INTRAVENOUS; SUBCUTANEOUS at 21:01

## 2024-02-26 RX ADMIN — HEPARIN SODIUM 5000 UNITS: 5000 INJECTION INTRAVENOUS; SUBCUTANEOUS at 14:34

## 2024-02-26 RX ADMIN — ACETAMINOPHEN 650 MG: 325 TABLET ORAL at 20:59

## 2024-02-26 RX ADMIN — CYCLOBENZAPRINE 5 MG: 10 TABLET, FILM COATED ORAL at 21:00

## 2024-02-26 RX ADMIN — BUSPIRONE HYDROCHLORIDE 10 MG: 10 TABLET ORAL at 20:59

## 2024-02-26 RX ADMIN — INSULIN GLARGINE 72 UNITS: 100 INJECTION, SOLUTION SUBCUTANEOUS at 14:50

## 2024-02-26 RX ADMIN — INSULIN GLARGINE 42 UNITS: 100 INJECTION, SOLUTION SUBCUTANEOUS at 20:59

## 2024-02-26 RX ADMIN — SODIUM CHLORIDE, PRESERVATIVE FREE 10 ML: 5 INJECTION INTRAVENOUS at 21:00

## 2024-02-26 RX ADMIN — ATORVASTATIN CALCIUM 80 MG: 80 TABLET, FILM COATED ORAL at 20:59

## 2024-02-26 RX ADMIN — CALCIUM POLYCARBOPHIL 1250 MG: 625 TABLET ORAL at 14:41

## 2024-02-26 RX ADMIN — CARVEDILOL 3.12 MG: 3.12 TABLET, FILM COATED ORAL at 20:59

## 2024-02-26 RX ADMIN — SODIUM BICARBONATE 1300 MG: 650 TABLET ORAL at 14:33

## 2024-02-26 RX ADMIN — PRASUGREL 10 MG: 10 TABLET, FILM COATED ORAL at 14:41

## 2024-02-26 RX ADMIN — INSULIN LISPRO 4 UNITS: 100 INJECTION, SOLUTION INTRAVENOUS; SUBCUTANEOUS at 17:24

## 2024-02-26 RX ADMIN — SEVELAMER CARBONATE 1600 MG: 800 TABLET, FILM COATED ORAL at 17:24

## 2024-02-26 RX ADMIN — SODIUM CHLORIDE, PRESERVATIVE FREE 10 ML: 5 INJECTION INTRAVENOUS at 09:00

## 2024-02-26 RX ADMIN — ACETAMINOPHEN 650 MG: 325 TABLET ORAL at 14:57

## 2024-02-26 RX ADMIN — LIDOCAINE AND PRILOCAINE: 25; 25 CREAM TOPICAL at 08:23

## 2024-02-26 RX ADMIN — DOCUSATE SODIUM 100 MG: 100 CAPSULE, LIQUID FILLED ORAL at 14:33

## 2024-02-26 RX ADMIN — GABAPENTIN 300 MG: 300 CAPSULE ORAL at 21:00

## 2024-02-26 RX ADMIN — ASPIRIN 81 MG: 81 TABLET, CHEWABLE ORAL at 14:33

## 2024-02-26 RX ADMIN — HEPARIN SODIUM 5000 UNITS: 5000 INJECTION INTRAVENOUS; SUBCUTANEOUS at 05:24

## 2024-02-26 RX ADMIN — SEVELAMER CARBONATE 1600 MG: 800 TABLET, FILM COATED ORAL at 14:33

## 2024-02-26 RX ADMIN — INSULIN LISPRO 20 UNITS: 100 INJECTION, SOLUTION INTRAVENOUS; SUBCUTANEOUS at 14:35

## 2024-02-26 RX ADMIN — INSULIN LISPRO 20 UNITS: 100 INJECTION, SOLUTION INTRAVENOUS; SUBCUTANEOUS at 17:25

## 2024-02-26 RX ADMIN — DOCUSATE SODIUM 100 MG: 100 CAPSULE, LIQUID FILLED ORAL at 20:59

## 2024-02-26 RX ADMIN — Medication 400 MG: at 21:00

## 2024-02-26 RX ADMIN — SODIUM BICARBONATE 1300 MG: 650 TABLET ORAL at 20:59

## 2024-02-26 RX ADMIN — BUMETANIDE 3 MG: 1 TABLET ORAL at 21:00

## 2024-02-26 RX ADMIN — BUMETANIDE 3 MG: 1 TABLET ORAL at 14:32

## 2024-02-26 RX ADMIN — BUSPIRONE HYDROCHLORIDE 10 MG: 10 TABLET ORAL at 14:33

## 2024-02-26 ASSESSMENT — PAIN SCALES - GENERAL
PAINLEVEL_OUTOF10: 7
PAINLEVEL_OUTOF10: 10
PAINLEVEL_OUTOF10: 0
PAINLEVEL_OUTOF10: 10

## 2024-02-26 ASSESSMENT — PAIN DESCRIPTION - DESCRIPTORS
DESCRIPTORS: ACHING

## 2024-02-26 ASSESSMENT — PAIN DESCRIPTION - PAIN TYPE
TYPE: CHRONIC PAIN
TYPE: CHRONIC PAIN

## 2024-02-26 ASSESSMENT — PAIN DESCRIPTION - ORIENTATION
ORIENTATION: RIGHT;LEFT
ORIENTATION: RIGHT;LEFT

## 2024-02-26 ASSESSMENT — PAIN DESCRIPTION - LOCATION
LOCATION: BACK;LEG

## 2024-02-26 NOTE — CARE COORDINATION
Writer is following for potential discharge placement.  Writer placed call to Karol with [facility] regarding referral.  No answer, voicemail left for return call.  Electronically signed by AZ Saez on 2/26/2024 at 11:21 AM

## 2024-02-26 NOTE — PROGRESS NOTES
4/5  LUE Strength Comment: Overall 4/5    RUE AROM (degrees)  RUE AROM : WFL  Right Hand AROM (degrees)  Right Hand AROM: WFL  Tone RUE  RUE Tone: Normotonic  RUE Strength  R Hand General: 4/5  RUE Strength Comment: Overall 4/5         Fine Motor Skills/Coordination  Coordination  Movements Are Fluid And Coordinated: Yes              Bed Mobility  Bed mobility  Supine to Sit: Moderate assistance  Sit to Supine: Unable to assess  Scooting: Minimal assistance (Assist to scoot R hip towards EOB)  Bed Mobility Comments: HOB elevated to approx. 75 degrees, severe pain with movement. Increased time to complete. Pt up in chair at end of session    Balance  Balance  Sitting Balance: Minimal assistance (Assist due to pain)  Standing Balance: Contact guard assistance  Standing Balance  Time: 1-2 minutes, 2-3 minutes  Activity: self care, functional transfers/mobility  Comment: BUE support on RW    Transfers  Transfers  Sit to stand: Contact guard assistance  Stand to sit: Contact guard assistance  Transfer Comments: Good hand placement noted  Toilet Transfers  Toilet - Technique: Ambulating  Equipment Used: Grab bars  Toilet Transfer: Contact guard assistance    Functional Mobility  Functional - Mobility Device: Rolling Walker  Activity: To/from bathroom, Other (Throughout patient's room)  Assist Level: Contact guard assistance  Functional Mobility Comments: NoLOB noted, however slow pace due to pain. Moderate cues for RW mgmt to improve safety and balance    Assessment  Assessment  Performance deficits / Impairments: Decreased functional mobility , Decreased ADL status, Decreased strength, Decreased safe awareness, Decreased endurance, Decreased balance, Decreased high-level IADLs  Treatment Diagnosis: Impaired self-care status  Prognosis: Good  Decision Making: Medium Complexity  Discharge Recommendations: Patient would benefit from continued therapy after discharge    Activity Tolerance  Activity Tolerance: Patient limited  by pain, Patient Tolerated treatment well    Safety Devices  Type of Devices: All fall risk precautions in place, Call light within reach, Gait belt, Patient at risk for falls, Left in chair, Nurse notified    Patient Education  Patient Education  Education Given To: Patient  Education Provided: Role of Therapy, Plan of Care, Precautions, Transfer Training  Education Method: Verbal  Barriers to Learning: Other (Comment) (Pain)  Education Outcome: Continued education needed, Verbalized understanding, Demonstrated understanding      Functional Outcome Measures  AM-PAC Daily Activity - Inpatient   How much help is needed for putting on and taking off regular lower body clothing?: A Lot  How much help is needed for bathing (which includes washing, rinsing, drying)?: A Lot  How much help is needed for toileting (which includes using toilet, bedpan, or urinal)?: A Lot  How much help is needed for putting on and taking off regular upper body clothing?: A Little  How much help is needed for taking care of personal grooming?: A Little  How much help for eating meals?: A Little  [facility] Daily [facility] Raw Score: 15  AM-PAC Inpatient ADL T-Scale Score : 34.69  ADL Inpatient CMS 0-100% Score: 56.46  ADL Inpatient CMS G-Code Modifier : CK       Goals     Short Term Goals  Time Frame for Short Term Goals: By discharge, pt will  Short Term Goal 1: perform UB ADLs mod I  Short Term Goal 2: perform LB ADLs with supervision, using adaptive techniques/equipment as needed  Short Term Goal 3: perform functional transfers/mobility with supervision, using least restrictive device and Good safety  Short Term Goal 4: V/D at least two nonpharmaceutical pain mgmt techniques to improve balance and ability to participate in ADLs  Short Term Goal 5: actively participate in 10-15+ minutes of therapeutic exercise/functional activity to promote safety and independence with self care and mobility    Plan  Occupational Therapy Plan  Times

## 2024-02-26 NOTE — PROGRESS NOTES
(HUMALOG) injection vial 20 Units, TID WC  insulin glargine (LANTUS) injection vial 72 Units, QAM  insulin glargine (LANTUS) injection vial 42 Units, Nightly  lidocaine 4 % external patch 1 patch, Daily  lidocaine-prilocaine (EMLA) cream, PRN  magnesium oxide (MAG-OX) tablet 400 mg, Daily  melatonin tablet 10.5 mg, Nightly PRN  midodrine (PROAMATINE) tablet 5 mg, PRN  nitroGLYCERIN (NITROSTAT) SL tablet 0.4 mg, Q5 Min PRN  Polyvinyl Alcohol-Povidone PF (REFRESH) 1.4-0.6 % ophthalmic solution 1 drop, PRN  prasugrel (EFFIENT) tablet 10 mg, Daily  sevelamer (RENVELA) tablet 1,600 mg, TID WC  sodium bicarbonate tablet 1,300 mg, TID  sodium chloride (OCEAN, BABY AYR) 0.65 % nasal spray 1 spray, Q4H PRN  venlafaxine (EFFEXOR XR) extended release capsule 75 mg, Daily with breakfast  sodium chloride flush 0.9 % injection 5-40 mL, 2 times per day  sodium chloride flush 0.9 % injection 5-40 mL, PRN  0.9 % sodium chloride infusion, PRN  ondansetron (ZOFRAN-ODT) disintegrating tablet 4 mg, Q8H PRN   Or  ondansetron (ZOFRAN) injection 4 mg, Q6H PRN  acetaminophen (TYLENOL) tablet 650 mg, Q6H PRN   Or  acetaminophen (TYLENOL) suppository 650 mg, Q6H PRN  polyethylene glycol (GLYCOLAX) packet 17 g, Daily PRN  heparin (porcine) injection 5,000 Units, 3 times per day  glucose chewable tablet 16 g, PRN  dextrose bolus 10% 125 mL, PRN   Or  dextrose bolus 10% 250 mL, PRN  glucagon injection 1 mg, PRN  dextrose 10 % infusion, Continuous PRN  insulin lispro (HUMALOG) injection vial 0-16 Units, TID WC  insulin lispro (HUMALOG) injection vial 0-4 Units, Nightly      Objective:  CURRENT TEMPERATURE:  Temp: 98.1 °F (36.7 °C)  MAXIMUM TEMPERATURE OVER 24HRS:  Temp (24hrs), Av.3 °F (36.8 °C), Min:97.8 °F (36.6 °C), Max:98.8 °F (37.1 °C)    CURRENT RESPIRATORY RATE:  Respirations: 16  CURRENT PULSE:  Pulse: 77  CURRENT BLOOD PRESSURE:  BP: 127/77  24HR BLOOD PRESSURE RANGE:  Systolic (24hrs), Av , Min:114 , Max:147   ; Diastolic  (24hrs), Av, Min:40, Max:81    24HR INTAKE/OUTPUT:    Intake/Output Summary (Last 24 hours) at 2024 1551  Last data filed at 2024 1331  Gross per 24 hour   Intake 500 ml   Output 2500 ml   Net -2000 ml       Patient Vitals for the past 96 hrs (Last 3 readings):   Weight   24 1331 112.3 kg (247 lb 9.2 oz)   24 0947 114.3 kg (251 lb 15.8 oz)   24 0404 116.1 kg (255 lb 15.3 oz)       Physical Exam:  GENERAL APPEARANCE: Alert and cooperative, and appears to be in no acute distress.  HEAD: normocephalic  EYES: EOMI. Not pale, anicteric   NOSE:  No nasal discharge.    THROAT:  Oral cavity and pharynx normal. Moist  NECK:   Supple with no JVD  CARDIAC: Normal S1 and S2. No S3, S4 or murmurs. Rhythm is regular.  LUNGS: Clear to auscultation  without rales, rhonchi, wheezing or diminished breath sounds.  ABDOMEN:  soft; normal bowel sounds.  MUSKULOSKELETAL: Adequately aligned spine. No joint erythema or tenderness.   EXTREMITIES: 1-2+ edema.   NEURO: Nonfocal    Labs:   CBC:  Recent Labs     24  1517   WBC 7.2  --    RBC 3.39*  --    HGB 12.2 12.8   HCT 36.2 39.3   .7*  --    MCH 36.1*  --    MCHC 33.8  --    RDW 19.1*  --      --    MPV 7.9  --         BMP:   Recent Labs     24  0710 24  0543 24  0531    133* 136   K 3.7 4.4 4.5   CL 96* 96* 96*   CO2 28  22   BUN 40* 32* 49*   CREATININE 3.6* 3.5* 4.5*   GLUCOSE 149* 273* 196*   CALCIUM 10.1 9.9 9.6        Recent Labs     24  2240   LABALBU 4.4       Recent Labs     24   PROT 7.7     Assessment/plan:    1.  End-stage renal disease - Maintain MWF hemodialysis schedule.  Renal diet,i.e 2-gram sodium,2-gram potassium,1500 ml fluid restriction,1-gram phosphorus, 1800 KCal and 1.2 gram/kg protein per day.     2.  Systemic hypertension - blood pressure control is adequate.    3.  Recurrent episodes of chest pain - for cardiac stress test tomorrow.    Prognosis is

## 2024-02-26 NOTE — PROGRESS NOTES
Physical Therapy  DATE: 2024    NAME: Estefany Garcia  MRN: [id number]   : 1960    Patient not seen this date for Physical Therapy due to:      [] Cancel by RN or physician due to:    [x] Hemodialysis; checked on pt @ 8054-2899.Pt stating sh was in pain this date. Writer asked RN if pt was due for any pain medication when RN informed writer pt is going to dialysis. Will continue to follow.     [] Critical Lab Value Level     [] Blood transfusion in progress    [] Acute or unstable cardiovascular status   _MAP < 55 or more than >115  _HR < 40 or > 130    [] Acute or unstable pulmonary status   -FiO2 > 60%   _RR < 5 or >40    _O2 sats < 85%    [] Strict Bedrest    [] Off Unit for surgery or procedure    [] Off Unit for testing       [] Pending imaging to R/O fracture    [] Refusal by Patient      [] Other      [] PT being discontinued at this time. Patient independent. No further needs.     [] PT being discontinued at this time as the patient has been transferred to hospice care. No further needs.      Electronically signed by Wendy Bonilla PTA on 24 at 10:17 AM EST

## 2024-02-26 NOTE — PROGRESS NOTES
HEMODIALYSIS POST TREATMENT NOTE    Treatment time ordered: 210    Actual treatment time: 210    UltraFiltration Goal: 2500   UltraFiltration Removed: 2500      Pre Treatment weight: 114.3  Post Treatment weight: 112.3  Estimated Dry Weight: 112.5    Access used:     Central Venous Catheter:          Tunneled or Non-tunneled: na           Site: na          Access Flow: na      Internal Access:       AV Fistula or AV Graft: AVF         Site: ROBIN       Access Flow: 400       Sign and symptoms of infection: na       If YES: na    Medications or blood products given: na    Chronic outpatient schedule: [facility]    Chronic outpatient unit: [facility]    Summary of response to treatment: Pt tolerated well, tx d/cd , all blood rerturned, both needles pulled, and both sites held 5 min each 2000 Ml removed    Explain if orders NOT met, was physician notified:trinh RODRIGUEZ flowsheet faxed to patient unit/ placed in patient chart: yes    Post assessment completed: Layne Greer Rn    Report given to: Margaret Nuno      * Intra-treatment documented Safety Checks include the followin) Access and face visible at all times.     2) All connections and blood lines are secure with no kinks.     3) NVL alarm engaged.     4) Hemosafe device applied (if applicable).     5) No collapse of Arterial or Venous blood chambers.     6) All blood lines / pump segments in the air detectors.

## 2024-02-26 NOTE — PROGRESS NOTES
Progress Note  Date:2024       Room:[address] [place] [address]-  Patient Name:Estefany Garcia     YOB: 1960     Age:63 y.o.        Subjective    Subjective:  Symptoms:  Stable.  (Continues to complain of midsternal chest pressure. Feels that abdomen is less distended. ).    Diet:  Adequate intake.    Activity level: Returning to normal.    Pain:  She reports no pain.       Review of Systems  Objective         Vitals Last 24 Hours:  TEMPERATURE:  Temp  Av.4 °F (36.9 °C)  Min: 97.8 °F (36.6 °C)  Max: 98.8 °F (37.1 °C)  RESPIRATIONS RANGE: Resp  Av.2  Min: 16  Max: 18  PULSE OXIMETRY RANGE: SpO2  Av.6 %  Min: 92 %  Max: 98 %  PULSE RANGE: Pulse  Av.8  Min: 66  Max: 80  BLOOD PRESSURE RANGE: Systolic (24hrs), Av , Min:108 , Max:123   ; Diastolic (24hrs), Av, Min:40, Max:70    I/O (24Hr):  No intake or output data in the 24 hours ending 24 0809  Objective:  General Appearance:  Comfortable.    Vital signs: (most recent): Blood pressure (!) 123/40, pulse 70, temperature 98.4 °F (36.9 °C), temperature source Axillary, resp. rate 16, height 1.651 m (5' 5\"), weight 116.1 kg (255 lb 15.3 oz), SpO2 92 %.  Vital signs are normal.    Heart: Normal rate.  Regular rhythm.  S1 normal and S2 normal.      Labs/Imaging/Diagnostics    Labs:  CBC:  Recent Labs     24  2240   WBC 7.2   RBC 3.39*   HGB 12.2   HCT 36.2   .7*   RDW 19.1*        CHEMISTRIES:  Recent Labs     24  0710 24  0543 24  0531    133* 136   K 3.7 4.4 4.5   CL 96* 96* 96*   CO2 28 26 22   BUN 40* 32* 49*   CREATININE 3.6* 3.5* 4.5*   GLUCOSE 149* 273* 196*     PT/INR:No results for input(s): \"PROTIME\", \"INR\" in the last 72 hours.  APTT:No results for input(s): \"APTT\" in the last 72 hours.  LIVER PROFILE:  Recent Labs     24  2240   AST 29   ALT 23   BILIDIR 0.2   BILITOT 0.7   ALKPHOS 141*       Imaging Last 24 Hours:  US ABDOMEN LIMITED    Result Date: 2024  EXAMINATION:

## 2024-02-26 NOTE — PLAN OF CARE
Problem: Discharge Planning  Goal: Discharge to home or other facility with appropriate resources  2/25/2024 1845 by Juan Asif RN  Outcome: Progressing  2/25/2024 1659 by Juan Asif RN  Outcome: Progressing     Problem: Pain  Goal: Verbalizes/displays adequate comfort level or baseline comfort level  2/25/2024 2228 by Tierra Lind RN  Outcome: Progressing  2/25/2024 1845 by Juan Asif RN  Outcome: Progressing  2/25/2024 1659 by Juan Asif RN  Outcome: Progressing     Problem: Safety - Adult  Goal: Free from fall injury  2/25/2024 2228 by Tierra Lind RN  Outcome: Progressing  2/25/2024 1845 by Juan Asif RN  Outcome: Progressing  2/25/2024 1659 by Juan Asif RN  Outcome: Progressing     Problem: ABCDS Injury Assessment  Goal: Absence of physical injury  2/25/2024 1845 by Juan Asif RN  Outcome: Progressing  2/25/2024 1659 by Juan Asif RN  Outcome: Progressing     Problem: Chronic Conditions and Co-morbidities  Goal: Patient's chronic conditions and co-morbidity symptoms are monitored and maintained or improved  2/25/2024 1845 by Juan Asif RN  Outcome: Progressing  2/25/2024 1659 by Juan Asif RN  Outcome: Progressing

## 2024-02-26 NOTE — PROGRESS NOTES
02/25/24 1300   Encounter Summary   Encounter Overview/Reason  Volunteer Encounter   Service Provided For: Patient   Referral/Consult From: Nhi   Last Encounter  02/25/24   Complexity of Encounter Low   Begin Time 1000   End Time  1015   Total Time Calculated 15 min   Spiritual/Emotional needs   Type Spiritual Support   Rituals, Rites and Sacraments   Type Baptism Communion

## 2024-02-26 NOTE — DISCHARGE INSTR - COC
NO:}  Bladder: {YES / NO:}  Urinary Catheter: {Urinary Catheter:410063320}   Colostomy/Ileostomy/Ileal Conduit: {YES / NO:}       Date of Last BM: ***  No intake or output data in the 24 hours ending 24 0853  I/O last 3 completed shifts:  In: 500 [P.O.:500]  Out: -     Safety Concerns:     { ANTHONY Safety Concerns:013049199}    Impairments/Disabilities:      { ANTHONY Impairments/Disabilities:360775075}    Nutrition Therapy:  Current Nutrition Therapy:   { ANTHONY Diet List:076318533}    Routes of Feeding: {[facility] Other Feedings:930336042}  Liquids: {Slp liquid thickness:87943}  Daily Fluid Restriction: {[facility] DME Yes amt example:507465339}  Last Modified Barium Swallow with Video (Video Swallowing Test): {Done Not Done Date:}    Treatments at the Time of Hospital Discharge:   Respiratory Treatments: ***  Oxygen Therapy:  {Therapy; copd oxygen:19573}  Ventilator:    { CC Vent List:658296621}    Rehab Therapies: {THERAPEUTIC INTERVENTION:9315475474}  Weight Bearing Status/Restrictions: {[facility] Weight Bearin}  Other Medical Equipment (for information only, NOT a DME order):  {EQUIPMENT:987744351}  Other Treatments: ***    Patient's personal belongings (please select all that are sent with patient):  {[facility] DME Belongings:523879718}    RN SIGNATURE:  {Esignature:708138096}    CASE MANAGEMENT/SOCIAL WORK SECTION    Inpatient Status Date: ***    Readmission Risk Assessment Score:  Readmission Risk              Risk of Unplanned Readmission:  0           Discharging to Facility/ Agency   Name:   Address:  Phone:  Fax:    Dialysis Facility (if applicable)   Name:  Address:  Dialysis Schedule:  Phone:  Fax:    / signature: {Esignature:960099527}    PHYSICIAN SECTION    Prognosis: {Prognosis:7102408660}    Condition at Discharge: { Patient Condition:975760829}    Rehab Potential (if transferring to Rehab): {Prognosis:7711823185}    Recommended Labs or Other

## 2024-02-26 NOTE — PROGRESS NOTES
[facility]   OCCUPATIONAL THERAPY MISSED TREATMENT NOTE   INPATIENT   Date: 24  Patient Name: Estefany Garcia       Room:   MRN: [id number]   Account #: [id number]    : 1960  (63 y.o.)  Gender: female   Referring Practitioner: Madonna Aviles MD             REASON FOR MISSED TREATMENT:  Patient unable to participate   -    OT began asking social/functional history questions when writer was informed that patient was being taken to dialysis. OT will continue to follow and attempt as able.  [id number]        Electronically signed by ZAYDA Wells on 24 at 9:59 AM EST

## 2024-02-26 NOTE — PROGRESS NOTES
HEMODIALYSIS PRE-TREATMENT NOTE    Patient Identifiers prior to treatment: Name  MRN    Isolation Required: Yes                      Isolation Type: Contact       (please document if patient is being managed as a PUI/COVID-19 patient)        Hepatitis status:                           Date Drawn                             Result  Hepatitis B Surface Antigen 2023     NEG                     Hepatitis B Surface Antibody 2023 POS     >1000   Hepatitis B Core Antibody 2023 NEG          How was Hepatitis Status verified: Epic Chart     Was a copy of the labs you documented provided to facility for the patient's chart: Yes    Hemodialysis orders verified: Yes by Layne Greer    Access Within normal limits ( I.e. s/s of infection,...): WNL     Pre-Assessment completed: Yes by Layne Greer    Pre-dialysis report received from: Christina                      Time: 0900

## 2024-02-27 ENCOUNTER — APPOINTMENT (OUTPATIENT)
Dept: NUCLEAR MEDICINE | Age: 64
DRG: 313 | End: 2024-02-27
Payer: COMMERCIAL

## 2024-02-27 ENCOUNTER — HOSPITAL ENCOUNTER (OUTPATIENT)
Age: 64
Setting detail: OBSERVATION
Discharge: HOME | DRG: 313 | End: 2024-02-29
Payer: COMMERCIAL

## 2024-02-27 ENCOUNTER — HOSPITAL ENCOUNTER (OUTPATIENT)
Dept: CARDIAC REHAB | Age: 64
Setting detail: THERAPIES SERIES
Discharge: HOME | End: 2024-02-27
Payer: COMMERCIAL

## 2024-02-27 LAB
ECHO BSA: 2.29 M2
EKG ATRIAL RATE: 72 BPM
EKG P AXIS: 71 DEGREES
EKG P-R INTERVAL: 174 MS
EKG Q-T INTERVAL: 422 MS
EKG QRS DURATION: 96 MS
EKG QTC CALCULATION (BAZETT): 462 MS
EKG R AXIS: 79 DEGREES
EKG T AXIS: 29 DEGREES
EKG VENTRICULAR RATE: 72 BPM
STRESS BASELINE HR: 71 BPM
STRESS ESTIMATED WORKLOAD: 1 METS
STRESS PEAK DIAS BP: 48 MMHG
STRESS PEAK SYS BP: 130 MMHG
STRESS PERCENT HR ACHIEVED: 55 %
STRESS POST PEAK HR: 86 BPM
STRESS RATE PRESSURE PRODUCT: NORMAL BPM*MMHG
STRESS STAGE RECOVERY 1 BP: NORMAL MMHG
STRESS STAGE RECOVERY 1 DURATION: 1 MIN:SEC
STRESS STAGE RECOVERY 1 HR: 85 BPM
STRESS STAGE RECOVERY 2 BP: NORMAL MMHG
STRESS STAGE RECOVERY 2 DURATION: 5 MIN:SEC
STRESS STAGE RECOVERY 2 HR: 76 BPM
STRESS TARGET HR: 155 BPM

## 2024-02-27 PROCEDURE — 97530 THERAPEUTIC ACTIVITIES: CPT

## 2024-02-27 PROCEDURE — 93016 CV STRESS TEST SUPVJ ONLY: CPT | Performed by: RADIOLOGY

## 2024-02-27 PROCEDURE — 6360000002 HC RX W HCPCS: Performed by: FAMILY MEDICINE

## 2024-02-27 PROCEDURE — 96372 THER/PROPH/DIAG INJ SC/IM: CPT

## 2024-02-27 PROCEDURE — G0378 HOSPITAL OBSERVATION PER HR: HCPCS

## 2024-02-27 PROCEDURE — 97116 GAIT TRAINING THERAPY: CPT

## 2024-02-27 PROCEDURE — 3430000000 HC RX DIAGNOSTIC RADIOPHARMACEUTICAL: Performed by: INTERNAL MEDICINE

## 2024-02-27 PROCEDURE — 78452 HT MUSCLE IMAGE SPECT MULT: CPT

## 2024-02-27 PROCEDURE — 93017 CV STRESS TEST TRACING ONLY: CPT

## 2024-02-27 PROCEDURE — 97110 THERAPEUTIC EXERCISES: CPT

## 2024-02-27 PROCEDURE — 82947 ASSAY GLUCOSE BLOOD QUANT: CPT

## 2024-02-27 PROCEDURE — 6360000002 HC RX W HCPCS: Performed by: INTERNAL MEDICINE

## 2024-02-27 PROCEDURE — 99233 SBSQ HOSP IP/OBS HIGH 50: CPT | Performed by: NURSE PRACTITIONER

## 2024-02-27 PROCEDURE — A9500 TC99M SESTAMIBI: HCPCS | Performed by: INTERNAL MEDICINE

## 2024-02-27 PROCEDURE — 2580000003 HC RX 258: Performed by: FAMILY MEDICINE

## 2024-02-27 PROCEDURE — 6370000000 HC RX 637 (ALT 250 FOR IP): Performed by: FAMILY MEDICINE

## 2024-02-27 RX ORDER — SODIUM CHLORIDE 0.9 % (FLUSH) 0.9 %
10 SYRINGE (ML) INJECTION PRN
Status: DISCONTINUED | OUTPATIENT
Start: 2024-02-27 | End: 2024-02-28 | Stop reason: HOSPADM

## 2024-02-27 RX ORDER — AMINOPHYLLINE 25 MG/ML
50 INJECTION, SOLUTION INTRAVENOUS PRN
Status: ACTIVE | OUTPATIENT
Start: 2024-02-27 | End: 2024-02-27

## 2024-02-27 RX ORDER — NITROGLYCERIN 0.4 MG/1
0.4 TABLET SUBLINGUAL EVERY 5 MIN PRN
Status: ACTIVE | OUTPATIENT
Start: 2024-02-27 | End: 2024-02-27

## 2024-02-27 RX ORDER — SODIUM CHLORIDE 0.9 % (FLUSH) 0.9 %
5-40 SYRINGE (ML) INJECTION PRN
Status: ACTIVE | OUTPATIENT
Start: 2024-02-27 | End: 2024-02-27

## 2024-02-27 RX ORDER — SODIUM CHLORIDE 9 MG/ML
500 INJECTION, SOLUTION INTRAVENOUS CONTINUOUS PRN
Status: ACTIVE | OUTPATIENT
Start: 2024-02-27 | End: 2024-02-27

## 2024-02-27 RX ORDER — REGADENOSON 0.08 MG/ML
0.4 INJECTION, SOLUTION INTRAVENOUS
Status: COMPLETED | OUTPATIENT
Start: 2024-02-27 | End: 2024-02-27

## 2024-02-27 RX ORDER — ALBUTEROL SULFATE 90 UG/1
2 AEROSOL, METERED RESPIRATORY (INHALATION) PRN
Status: ACTIVE | OUTPATIENT
Start: 2024-02-27 | End: 2024-02-27

## 2024-02-27 RX ORDER — TETRAKIS(2-METHOXYISOBUTYLISOCYANIDE)COPPER(I) TETRAFLUOROBORATE 1 MG/ML
30 INJECTION, POWDER, LYOPHILIZED, FOR SOLUTION INTRAVENOUS
Status: COMPLETED | OUTPATIENT
Start: 2024-02-27 | End: 2024-02-27

## 2024-02-27 RX ORDER — ATROPINE SULFATE 0.1 MG/ML
0.5 INJECTION INTRAVENOUS EVERY 5 MIN PRN
Status: ACTIVE | OUTPATIENT
Start: 2024-02-27 | End: 2024-02-27

## 2024-02-27 RX ORDER — METOPROLOL TARTRATE 1 MG/ML
5 INJECTION, SOLUTION INTRAVENOUS EVERY 5 MIN PRN
Status: ACTIVE | OUTPATIENT
Start: 2024-02-27 | End: 2024-02-27

## 2024-02-27 RX ORDER — TETRAKIS(2-METHOXYISOBUTYLISOCYANIDE)COPPER(I) TETRAFLUOROBORATE 1 MG/ML
10 INJECTION, POWDER, LYOPHILIZED, FOR SOLUTION INTRAVENOUS
Status: COMPLETED | OUTPATIENT
Start: 2024-02-27 | End: 2024-02-27

## 2024-02-27 RX ADMIN — DOCUSATE SODIUM 100 MG: 100 CAPSULE, LIQUID FILLED ORAL at 23:33

## 2024-02-27 RX ADMIN — INSULIN LISPRO 20 UNITS: 100 INJECTION, SOLUTION INTRAVENOUS; SUBCUTANEOUS at 08:20

## 2024-02-27 RX ADMIN — HEPARIN SODIUM 5000 UNITS: 5000 INJECTION INTRAVENOUS; SUBCUTANEOUS at 23:32

## 2024-02-27 RX ADMIN — INSULIN GLARGINE 72 UNITS: 100 INJECTION, SOLUTION SUBCUTANEOUS at 08:20

## 2024-02-27 RX ADMIN — HEPARIN SODIUM 5000 UNITS: 5000 INJECTION INTRAVENOUS; SUBCUTANEOUS at 06:11

## 2024-02-27 RX ADMIN — SEVELAMER CARBONATE 1600 MG: 800 TABLET, FILM COATED ORAL at 12:02

## 2024-02-27 RX ADMIN — Medication 38.6 MILLICURIE: at 14:37

## 2024-02-27 RX ADMIN — INSULIN LISPRO 4 UNITS: 100 INJECTION, SOLUTION INTRAVENOUS; SUBCUTANEOUS at 17:23

## 2024-02-27 RX ADMIN — INSULIN LISPRO 12 UNITS: 100 INJECTION, SOLUTION INTRAVENOUS; SUBCUTANEOUS at 12:03

## 2024-02-27 RX ADMIN — BUMETANIDE 3 MG: 1 TABLET ORAL at 12:02

## 2024-02-27 RX ADMIN — SODIUM BICARBONATE 1300 MG: 650 TABLET ORAL at 23:32

## 2024-02-27 RX ADMIN — SODIUM CHLORIDE, PRESERVATIVE FREE 10 ML: 5 INJECTION INTRAVENOUS at 12:05

## 2024-02-27 RX ADMIN — SODIUM CHLORIDE, PRESERVATIVE FREE 10 ML: 5 INJECTION INTRAVENOUS at 22:00

## 2024-02-27 RX ADMIN — PRASUGREL 10 MG: 10 TABLET, FILM COATED ORAL at 12:14

## 2024-02-27 RX ADMIN — Medication 400 MG: at 23:33

## 2024-02-27 RX ADMIN — HEPARIN SODIUM 5000 UNITS: 5000 INJECTION INTRAVENOUS; SUBCUTANEOUS at 14:58

## 2024-02-27 RX ADMIN — ASPIRIN 81 MG: 81 TABLET, CHEWABLE ORAL at 12:03

## 2024-02-27 RX ADMIN — ATORVASTATIN CALCIUM 80 MG: 80 TABLET, FILM COATED ORAL at 23:33

## 2024-02-27 RX ADMIN — INSULIN LISPRO 4 UNITS: 100 INJECTION, SOLUTION INTRAVENOUS; SUBCUTANEOUS at 08:22

## 2024-02-27 RX ADMIN — Medication 15.5 MILLICURIE: at 10:18

## 2024-02-27 RX ADMIN — INSULIN GLARGINE 42 UNITS: 100 INJECTION, SOLUTION SUBCUTANEOUS at 23:31

## 2024-02-27 RX ADMIN — INSULIN LISPRO 20 UNITS: 100 INJECTION, SOLUTION INTRAVENOUS; SUBCUTANEOUS at 12:04

## 2024-02-27 RX ADMIN — GABAPENTIN 300 MG: 300 CAPSULE ORAL at 12:01

## 2024-02-27 RX ADMIN — SEVELAMER CARBONATE 1600 MG: 800 TABLET, FILM COATED ORAL at 17:23

## 2024-02-27 RX ADMIN — BUMETANIDE 3 MG: 1 TABLET ORAL at 23:33

## 2024-02-27 RX ADMIN — CARVEDILOL 3.12 MG: 3.12 TABLET, FILM COATED ORAL at 23:33

## 2024-02-27 RX ADMIN — CALCIUM POLYCARBOPHIL 1250 MG: 625 TABLET ORAL at 12:14

## 2024-02-27 RX ADMIN — DOCUSATE SODIUM 100 MG: 100 CAPSULE, LIQUID FILLED ORAL at 12:01

## 2024-02-27 RX ADMIN — SODIUM BICARBONATE 1300 MG: 650 TABLET ORAL at 12:02

## 2024-02-27 RX ADMIN — BUSPIRONE HYDROCHLORIDE 10 MG: 10 TABLET ORAL at 12:03

## 2024-02-27 RX ADMIN — ACETAMINOPHEN 650 MG: 325 TABLET ORAL at 17:22

## 2024-02-27 RX ADMIN — INSULIN LISPRO 20 UNITS: 100 INJECTION, SOLUTION INTRAVENOUS; SUBCUTANEOUS at 17:22

## 2024-02-27 RX ADMIN — GABAPENTIN 300 MG: 300 CAPSULE ORAL at 23:32

## 2024-02-27 RX ADMIN — VENLAFAXINE HYDROCHLORIDE 75 MG: 75 CAPSULE, EXTENDED RELEASE ORAL at 12:03

## 2024-02-27 RX ADMIN — CARVEDILOL 3.12 MG: 3.12 TABLET, FILM COATED ORAL at 12:03

## 2024-02-27 RX ADMIN — REGADENOSON 0.4 MG: 0.08 INJECTION, SOLUTION INTRAVENOUS at 10:16

## 2024-02-27 RX ADMIN — ALLOPURINOL 100 MG: 100 TABLET ORAL at 12:03

## 2024-02-27 RX ADMIN — BUSPIRONE HYDROCHLORIDE 10 MG: 10 TABLET ORAL at 23:33

## 2024-02-27 ASSESSMENT — PAIN - FUNCTIONAL ASSESSMENT: PAIN_FUNCTIONAL_ASSESSMENT: PREVENTS OR INTERFERES SOME ACTIVE ACTIVITIES AND ADLS

## 2024-02-27 ASSESSMENT — PAIN SCALES - GENERAL
PAINLEVEL_OUTOF10: 7
PAINLEVEL_OUTOF10: 8
PAINLEVEL_OUTOF10: 6

## 2024-02-27 ASSESSMENT — PAIN DESCRIPTION - ORIENTATION
ORIENTATION: RIGHT
ORIENTATION: RIGHT;LOWER

## 2024-02-27 ASSESSMENT — PAIN DESCRIPTION - LOCATION
LOCATION: BACK
LOCATION: HIP

## 2024-02-27 ASSESSMENT — PAIN DESCRIPTION - DESCRIPTORS: DESCRIPTORS: SHOOTING

## 2024-02-27 NOTE — PROGRESS NOTES
[facility]   OCCUPATIONAL THERAPY MISSED TREATMENT NOTE   INPATIENT   Date: 24  Patient Name: Estefany Garcia       Room:   MRN: [id number]   Account #: [id number]    : 1960  (63 y.o.)  Gender: female   Referring Practitioner: Madonna Aviles MD  Diagnosis: Chest pain             REASON FOR MISSED TREATMENT:  Patient unable to participate   -    attempt, pt being taken down for procedure at this time. Will attempt later this afternoon if possible.         Electronically signed by DAYANA Romero on 24 at 8:27 AM EST

## 2024-02-27 NOTE — PROGRESS NOTES
NEPHROLOGY PROGRESS NOTE    Patient :  Estefany Garcia; 63 y.o. MRN# [id number]  Location:  2101/2101-01  Attending:  Niesha Sunshine MD  Admit Date:  2/23/2024   Hospital Day: 0    Reason for consultation: Management of hemodialysis dependent end-stage renal disease.    Consulting physician: Niesha Sunshine MD.    Interval history:   Patient does not have any complaints today.  She is scheduled for cardiac stress test later this morning.  She tolerated hemodialysis well yesterday with removal of 2.5 kg of fluid.    History of Present Illness:  This is a 63 y.o. female with a significant past medical history of systemic hypertension, osteoarthritis,  coronary artery disease s/p CABG with subsequent graft stent,  s/p cerebrovascular accident, type 2 diabetes mellitus and end-stage renal disease secondary to hypertensive and diabetic nephropathy [on routine hemodialysis Monday/Wednesday/Friday at [facility] dialysis unit [facility] urinary care of Dr. Graham using left arm AV fistula.  Patient presented to the hospital with complaints of fluid retention peripheral edema and abdominal distention and chest pressure   Patient having intradialytic weight gains and was advised to have 4 days a week dialysis with additional dialysis on Saturday.  Patient started liters of 112 kg  Chest x-ray showed no acute cardiopulmonary process stable mild cardiomegaly    Current Medications:    sodium chloride flush 0.9 % injection 10 mL, PRN  acetaminophen (TYLENOL) tablet 650 mg, Q6H PRN  allopurinol (ZYLOPRIM) tablet 100 mg, Daily  aspirin chewable tablet 81 mg, Daily  atorvastatin (LIPITOR) tablet 80 mg, Nightly  bumetanide (BUMEX) tablet 3 mg, BID  busPIRone (BUSPAR) tablet 10 mg, TID  polycarbophil (FIBERCON) tablet 1,250 mg, Daily  carvedilol (COREG) tablet 3.125 mg, BID  cyclobenzaprine (FLEXERIL) tablet 5 mg, TID PRN  docusate sodium (COLACE) capsule 100 mg, BID  epoetin raphael-epbx (RETACRIT) injection 3,000 Units, Once per day on

## 2024-02-27 NOTE — PLAN OF CARE
Problem: Discharge Planning  Goal: Discharge to home or other facility with appropriate resources  2/26/2024 1650 by Lala Rincon RN  Outcome: Progressing     Problem: Pain  Goal: Verbalizes/displays adequate comfort level or baseline comfort level  2/26/2024 1933 by Tierra Lind RN  Outcome: Progressing  Flowsheets (Taken 2/26/2024 1933)  Verbalizes/displays adequate comfort level or baseline comfort level:   Encourage patient to monitor pain and request assistance   Assess pain using appropriate pain scale  2/26/2024 1650 by Lala Rincon RN  Outcome: Progressing     Problem: Safety - Adult  Goal: Free from fall injury  2/26/2024 1650 by Lala Rincon RN  Outcome: Progressing     Problem: ABCDS Injury Assessment  Goal: Absence of physical injury  2/26/2024 1650 by Lala Rincon RN  Outcome: Progressing     Problem: Chronic Conditions and Co-morbidities  Goal: Patient's chronic conditions and co-morbidity symptoms are monitored and maintained or improved  2/26/2024 1650 by Lala Rincon RN  Outcome: Progressing

## 2024-02-27 NOTE — PROGRESS NOTES
Physical Therapy  [facility]    Date: 24  Patient Name: Estefany Garcia       Room: -  MRN: [id number]   Account: [id number]   : 1960  (63 y.o.) Gender: female     Referring Practitioner: Madonna Aviles MD  Diagnosis: Chest pain  Past Medical History:  has a past medical history of Arthritis, Backache, unspecified, CAD (coronary artery disease), Cerebral artery occlusion with cerebral infarction (HCC), CHF (congestive heart failure) ([facility]), Chronic kidney disease, Coronary atherosclerosis of artery bypass graft, COVID, Cramp of limb, Epistaxis, Gallstones, Hemodialysis patient ([facility]), Hyperlipidemia, Hypertension, Insomnia, Neuromuscular disorder ([facility]), Pneumonia, Psychiatric problem, Thyroid disease, Type II or unspecified type diabetes mellitus with renal manifestations, not stated as uncontrolled(250.40), Type II or unspecified type diabetes mellitus without mention of complication, not stated as uncontrolled, and Unspecified vitamin D deficiency.   Past Surgical History:   has a past surgical history that includes Coronary artery bypass graft (2005); Knee arthroscopy; Carpal tunnel release; Breast surgery; Tonsillectomy; Hand surgery; Ankle fracture surgery; Cholecystectomy, open (N/A); IR TUNNELED CVC PLACE WO SQ PORT/PUMP > 5 YEARS (2021); AV fistula creation (2021); Dialysis fistula creation (Left, 2021); other surgical history (2022); back surgery; Colonoscopy; eye surgery; fracture surgery; Cardiac surgery; IR TUNNELED CVC PLACE WO SQ PORT/PUMP > 5 YEARS (2023); IR TUNNELED CVC PLACE WO SQ PORT/PUMP > 5 YEARS (Right, 2023); Dialysis Catheter Insertion (Right, 2023); other surgical history (Left, 2023); Coronary angioplasty with stent (2023); Cardiac catheterization (); Coronary angioplasty with stent (); Dialysis fistula creation (Left, 2023); IR TUNNELED CVC PLACE WO SQ PORT/PUMP >  54.16  Mobility Inpatient CMS G-Code Modifier : CK   Goals  Short Term Goals  Time Frame for Short Term Goals: 14 days  Short Term Goal 1: Pt will increase Bilat LE strength by 1/2 MMG to maximize mobility  Short Term Goal 2: Ptw ill improve standing balance with RW to good to increase safety  Short Term Goal 3: Pt will improve endurance to allow pt to ambulate household distances  Short Term Goal 4: Ptwill perform bed mobility MOD IND with HOB elevated as preferred  Short Term Goal 5: Pt will transfer with RW MOD IND  Additional Goals?: Yes  Short Term Goal 6: Pt will ambulate with RW 50 ft MOD IND       02/27/24 1512   PT Individual Minutes   Time In 1310   Time Out 1350   Minutes 40         Electronically signed by Wendy Bonilla PTA on 2/27/24 at 3:13 PM EST

## 2024-02-27 NOTE — PROGRESS NOTES
[facility]  Progress Note                   Date:   2/27/2024  Patient name: Estefany Garcia  Date of admission:  2/23/2024 10:10 PM  MRN:   [id number]  YOB: 1960  PCP: Zulma Payne APRN - CNP    Reason for Admission: Chest pain [R07.9]  Elevated troponin [R79.89]  Bilateral edema of lower extremity [R60.0]    Subjective:       Clinical Changes /Abnormalities: Seen & examined in stress lab. No acute CV issues/concerns overnight. Labs, vitals, & tele reviewed.     Review of Systems    Medications:   Scheduled Meds:   allopurinol  100 mg Oral Daily    aspirin  81 mg Oral Daily    atorvastatin  80 mg Oral Nightly    bumetanide  3 mg Oral BID    busPIRone  10 mg Oral TID    polycarbophil  1,250 mg Oral Daily    carvedilol  3.125 mg Oral BID    docusate sodium  100 mg Oral BID    epoetin raphael-epbx  3,000 Units SubCUTAneous Once per day on Mon Wed Fri    gabapentin  300 mg Oral BID    insulin lispro  20 Units SubCUTAneous TID WC    insulin glargine  72 Units SubCUTAneous QAM    insulin glargine  42 Units SubCUTAneous Nightly    lidocaine  1 patch TransDERmal Daily    magnesium oxide  400 mg Oral Daily    prasugrel  10 mg Oral Daily    sevelamer  1,600 mg Oral TID WC    sodium bicarbonate  1,300 mg Oral TID    venlafaxine  75 mg Oral Daily with breakfast    sodium chloride flush  5-40 mL IntraVENous 2 times per day    heparin (porcine)  5,000 Units SubCUTAneous 3 times per day    insulin lispro  0-16 Units SubCUTAneous TID WC    insulin lispro  0-4 Units SubCUTAneous Nightly     Continuous Infusions:   sodium chloride      dextrose       CBC:   Recent Labs     02/26/24  1517   HGB 12.8     BMP:    Recent Labs     02/25/24  0543 02/26/24  0531   * 136   K 4.4 4.5   CL 96* 96*   CO2 26 22   BUN 32* 49*   CREATININE 3.5* 4.5*   GLUCOSE 273* 196*     Hepatic:No results for input(s): \"AST\", \"ALT\", \"ALB\", \"BILITOT\", \"ALKPHOS\" in the last 72 hours.  Troponin:   Recent Labs      equal to 1 cm in diameter incidentally noted on imaging study     Primary hypertension     Anemia of chronic disease     Chronic ischemic heart disease     Ischemic stroke of frontal lobe ([facility])     Morbid obesity with BMI of 40.0-44.9, adult ([facility])     Disequilibrium syndrome     Anxiety     Chronic midline low back pain with bilateral sciatica     Acute thoracic back pain     COVID     Delirium due to another medical condition     Cerebral hypoperfusion     Immature arteriovenous fistula ([facility])     History of fusion of cervical spine     Depression with anxiety     Vancomycin resistant Enterococcus UTI     ESRD on hemodialysis ([facility])     Dialysis disequilibrium syndrome     Acute cystitis without hematuria     VRE infection (vancomycin resistant Enterococcus)     Infection due to ESBL-producing Klebsiella pneumoniae     Class 2 severe obesity due to excess calories with serious comorbidity and body mass index (BMI) of 35.0 to 35.9 in adult ([facility])     Type 2 diabetes mellitus with hyperglycemia, with long-term current use of insulin ([facility])     Right hemiparesis ([facility])     Ulcer of left foot, limited to breakdown of skin ([facility])     Secondary hyperparathyroidism ([facility])     Hypertensive urgency     Hypoxia     Congestive heart failure ([facility])     Nephrotic range proteinuria     Acute respiratory failure with hypoxia ([facility])     Syncope and collapse     Subacute cough     Acute on chronic heart failure, unspecified heart failure type ([facility])     Diarrhea of presumed infectious origin     Epistaxis     Chronic respiratory failure with hypoxia (HCC)     Chest pain     Hemodialysis catheter dysfunction (HCC)     Dialysis AV fistula malfunction (HCC)     ESRD (end stage renal disease) (HCC)     ESRD (end stage renal disease) on dialysis ([facility])     Hypokalemia     ERICK (obstructive sleep apnea)     AMS (altered mental status)     Unstable angina ([facility])     Acute electrocardiogram changes     Renovascular hypertension     Episodic confusion

## 2024-02-27 NOTE — CARE COORDINATION
Writer is following for potential discharge plans.   [facility] denies this pt. Writer spoke to Karol and insurance has still not paid for pt last stay at facility.    Writer met with pt regarding this. Pt would like referral sent to Reena at [facility] and if they will not accept her she will return home with VNS.  Electronically signed by AZ Saez on 2/27/2024 at 1:20 PM

## 2024-02-27 NOTE — PLAN OF CARE
Problem: Discharge Planning  Goal: Discharge to home or other facility with appropriate resources  Outcome: Progressing  Note: PMR consult for ARU, waiting for eval.     Problem: Pain  Goal: Verbalizes/displays adequate comfort level or baseline comfort level  Outcome: Progressing  Note: Assessing pain, tylenol as needed.

## 2024-02-27 NOTE — PROGRESS NOTES
training, Functional mobility training, Safety education & training, Patient/Caregiver education & training, Equipment evaluation, education, & procurement, Pain management, Positioning, Self-Care / ADL    Assessment  Activity Tolerance  Activity Tolerance: Patient limited by pain  Assessment  Performance deficits / Impairments: Decreased functional mobility , Decreased ADL status, Decreased strength, Decreased safe awareness, Decreased endurance, Decreased balance, Decreased high-level IADLs  Treatment Diagnosis: Impaired self-care status  Prognosis: Good  Decision Making: Medium Complexity  Discharge Recommendations: Patient would benefit from continued therapy after discharge  OT Equipment Recommendations  Other: TBD  Safety Devices  Type of Devices: Left in chair, Call light within reach, Nurse notified, Chair alarm in place    AM-PAC Daily Activities Inpatient  AM-PAC Daily Activity - Inpatient   How much help is needed for putting on and taking off regular lower body clothing?: A Lot  How much help is needed for bathing (which includes washing, rinsing, drying)?: A Lot  How much help is needed for toileting (which includes using toilet, bedpan, or urinal)?: A Little  How much help is needed for putting on and taking off regular upper body clothing?: A Little  How much help is needed for taking care of personal grooming?: A Little  How much help for eating meals?: A Little  AM-PAC Inpatient Daily Activity Raw Score: 16  AM-PAC Inpatient ADL T-Scale Score : 35.96  ADL Inpatient CMS 0-100% Score: 53.32  ADL Inpatient CMS G-Code Modifier : CK       02/27/24 1310   OT Individual Minutes   Time In 1310   Time Out 1350   Minutes 40         Electronically signed by DAYANA Romero on 2/27/24 at 3:19 PM EST

## 2024-02-27 NOTE — PROGRESS NOTES
Dr. Katz notified regarding stress results via perfect serve.  Orders received for NPO @ midnight.

## 2024-02-28 ENCOUNTER — HOSPITAL ENCOUNTER (INPATIENT)
Age: 64
LOS: 8 days | Discharge: NURSING, REHAB OR LONG-TERM CARE | DRG: 250 | End: 2024-03-07
Attending: INTERNAL MEDICINE | Admitting: INTERNAL MEDICINE
Payer: COMMERCIAL

## 2024-02-28 VITALS
BODY MASS INDEX: 41.65 KG/M2 | WEIGHT: 250 LBS | HEIGHT: 65 IN | OXYGEN SATURATION: 98 % | DIASTOLIC BLOOD PRESSURE: 60 MMHG | TEMPERATURE: 97.6 F | RESPIRATION RATE: 16 BRPM | HEART RATE: 69 BPM | SYSTOLIC BLOOD PRESSURE: 115 MMHG

## 2024-02-28 DIAGNOSIS — R94.39 ABNORMAL STRESS TEST: ICD-10-CM

## 2024-02-28 PROBLEM — Z98.890 STATUS POST CARDIAC CATHETERIZATION: Status: ACTIVE | Noted: 2024-02-28

## 2024-02-28 LAB
GLUCOSE BLD-MCNC: 184 MG/DL (ref 65–105)
GLUCOSE BLD-MCNC: 303 MG/DL (ref 65–105)

## 2024-02-28 PROCEDURE — 7100000011 HC PHASE II RECOVERY - ADDTL 15 MIN: Performed by: INTERNAL MEDICINE

## 2024-02-28 PROCEDURE — G0378 HOSPITAL OBSERVATION PER HR: HCPCS

## 2024-02-28 PROCEDURE — 99239 HOSP IP/OBS DSCHRG MGMT >30: CPT | Performed by: FAMILY MEDICINE

## 2024-02-28 PROCEDURE — 7100000010 HC PHASE II RECOVERY - FIRST 15 MIN: Performed by: INTERNAL MEDICINE

## 2024-02-28 PROCEDURE — 4A023N7 MEASUREMENT OF CARDIAC SAMPLING AND PRESSURE, LEFT HEART, PERCUTANEOUS APPROACH: ICD-10-PCS | Performed by: INTERNAL MEDICINE

## 2024-02-28 PROCEDURE — 2060000000 HC ICU INTERMEDIATE R&B

## 2024-02-28 PROCEDURE — 6360000002 HC RX W HCPCS: Performed by: FAMILY MEDICINE

## 2024-02-28 PROCEDURE — 6360000002 HC RX W HCPCS: Performed by: INTERNAL MEDICINE

## 2024-02-28 PROCEDURE — 6370000000 HC RX 637 (ALT 250 FOR IP): Performed by: FAMILY MEDICINE

## 2024-02-28 PROCEDURE — 99152 MOD SED SAME PHYS/QHP 5/>YRS: CPT | Performed by: INTERNAL MEDICINE

## 2024-02-28 PROCEDURE — 6370000000 HC RX 637 (ALT 250 FOR IP): Performed by: INTERNAL MEDICINE

## 2024-02-28 PROCEDURE — 97116 GAIT TRAINING THERAPY: CPT

## 2024-02-28 PROCEDURE — 2500000003 HC RX 250 WO HCPCS: Performed by: INTERNAL MEDICINE

## 2024-02-28 PROCEDURE — 2580000003 HC RX 258: Performed by: INTERNAL MEDICINE

## 2024-02-28 PROCEDURE — 92920 PRQ TRLUML C ANGIOP 1ART&/BR: CPT | Performed by: INTERNAL MEDICINE

## 2024-02-28 PROCEDURE — 5A1D70Z PERFORMANCE OF URINARY FILTRATION, INTERMITTENT, LESS THAN 6 HOURS PER DAY: ICD-10-PCS | Performed by: INTERNAL MEDICINE

## 2024-02-28 PROCEDURE — 2580000003 HC RX 258: Performed by: STUDENT IN AN ORGANIZED HEALTH CARE EDUCATION/TRAINING PROGRAM

## 2024-02-28 PROCEDURE — 96372 THER/PROPH/DIAG INJ SC/IM: CPT

## 2024-02-28 PROCEDURE — 02713ZZ DILATION OF CORONARY ARTERY, TWO ARTERIES, PERCUTANEOUS APPROACH: ICD-10-PCS | Performed by: INTERNAL MEDICINE

## 2024-02-28 PROCEDURE — 6360000004 HC RX CONTRAST MEDICATION: Performed by: INTERNAL MEDICINE

## 2024-02-28 PROCEDURE — C1894 INTRO/SHEATH, NON-LASER: HCPCS | Performed by: INTERNAL MEDICINE

## 2024-02-28 PROCEDURE — 97530 THERAPEUTIC ACTIVITIES: CPT

## 2024-02-28 PROCEDURE — 82947 ASSAY GLUCOSE BLOOD QUANT: CPT

## 2024-02-28 PROCEDURE — 99233 SBSQ HOSP IP/OBS HIGH 50: CPT | Performed by: NURSE PRACTITIONER

## 2024-02-28 PROCEDURE — 93455 CORONARY ART/GRFT ANGIO S&I: CPT | Performed by: INTERNAL MEDICINE

## 2024-02-28 PROCEDURE — 99153 MOD SED SAME PHYS/QHP EA: CPT | Performed by: INTERNAL MEDICINE

## 2024-02-28 PROCEDURE — C1725 CATH, TRANSLUMIN NON-LASER: HCPCS | Performed by: INTERNAL MEDICINE

## 2024-02-28 PROCEDURE — B2111ZZ FLUOROSCOPY OF MULTIPLE CORONARY ARTERIES USING LOW OSMOLAR CONTRAST: ICD-10-PCS | Performed by: INTERNAL MEDICINE

## 2024-02-28 PROCEDURE — 6370000000 HC RX 637 (ALT 250 FOR IP): Performed by: STUDENT IN AN ORGANIZED HEALTH CARE EDUCATION/TRAINING PROGRAM

## 2024-02-28 PROCEDURE — 92921 HC PRQ CARDIAC ANGIO ADDL ART: CPT | Performed by: INTERNAL MEDICINE

## 2024-02-28 PROCEDURE — 1200000000 HC SEMI PRIVATE

## 2024-02-28 PROCEDURE — 2709999900 HC NON-CHARGEABLE SUPPLY: Performed by: INTERNAL MEDICINE

## 2024-02-28 PROCEDURE — 2580000003 HC RX 258: Performed by: FAMILY MEDICINE

## 2024-02-28 RX ORDER — INSULIN GLARGINE 100 [IU]/ML
36 INJECTION, SOLUTION SUBCUTANEOUS ONCE
Status: COMPLETED | OUTPATIENT
Start: 2024-02-28 | End: 2024-02-28

## 2024-02-28 RX ORDER — GABAPENTIN 300 MG/1
300 CAPSULE ORAL 2 TIMES DAILY
Status: DISCONTINUED | OUTPATIENT
Start: 2024-02-28 | End: 2024-03-07 | Stop reason: HOSPADM

## 2024-02-28 RX ORDER — SEVELAMER CARBONATE 800 MG/1
1600 TABLET, FILM COATED ORAL
Status: CANCELLED | OUTPATIENT
Start: 2024-02-28

## 2024-02-28 RX ORDER — INSULIN LISPRO 100 [IU]/ML
0-16 INJECTION, SOLUTION INTRAVENOUS; SUBCUTANEOUS
Status: DISCONTINUED | OUTPATIENT
Start: 2024-02-28 | End: 2024-03-07 | Stop reason: HOSPADM

## 2024-02-28 RX ORDER — ATORVASTATIN CALCIUM 80 MG/1
80 TABLET, FILM COATED ORAL NIGHTLY
Status: CANCELLED | OUTPATIENT
Start: 2024-02-28

## 2024-02-28 RX ORDER — SEVELAMER CARBONATE 800 MG/1
1600 TABLET, FILM COATED ORAL
Status: DISCONTINUED | OUTPATIENT
Start: 2024-02-28 | End: 2024-03-07 | Stop reason: HOSPADM

## 2024-02-28 RX ORDER — ALLOPURINOL 100 MG/1
100 TABLET ORAL DAILY
Status: DISCONTINUED | OUTPATIENT
Start: 2024-02-28 | End: 2024-03-07 | Stop reason: HOSPADM

## 2024-02-28 RX ORDER — CYCLOBENZAPRINE HCL 10 MG
5 TABLET ORAL 3 TIMES DAILY PRN
Status: CANCELLED | OUTPATIENT
Start: 2024-02-28

## 2024-02-28 RX ORDER — PHENOL 1.4 %
10 AEROSOL, SPRAY (ML) MUCOUS MEMBRANE NIGHTLY PRN
Status: CANCELLED | OUTPATIENT
Start: 2024-02-28

## 2024-02-28 RX ORDER — CARVEDILOL 3.12 MG/1
3.12 TABLET ORAL 2 TIMES DAILY
Status: DISCONTINUED | OUTPATIENT
Start: 2024-02-28 | End: 2024-03-07 | Stop reason: HOSPADM

## 2024-02-28 RX ORDER — POLYVINYL ALCOHOL 14 MG/ML
1 SOLUTION/ DROPS OPHTHALMIC PRN
Status: CANCELLED | OUTPATIENT
Start: 2024-02-28

## 2024-02-28 RX ORDER — SODIUM CHLORIDE 0.9 % (FLUSH) 0.9 %
10 SYRINGE (ML) INJECTION EVERY 12 HOURS SCHEDULED
Status: DISCONTINUED | OUTPATIENT
Start: 2024-02-28 | End: 2024-03-07 | Stop reason: HOSPADM

## 2024-02-28 RX ORDER — POTASSIUM CHLORIDE 750 MG/1
10 CAPSULE, EXTENDED RELEASE ORAL 2 TIMES DAILY
Status: DISCONTINUED | OUTPATIENT
Start: 2024-02-28 | End: 2024-03-07 | Stop reason: HOSPADM

## 2024-02-28 RX ORDER — PRASUGREL 10 MG/1
10 TABLET, FILM COATED ORAL DAILY
Status: CANCELLED | OUTPATIENT
Start: 2024-02-28

## 2024-02-28 RX ORDER — CYCLOBENZAPRINE HCL 5 MG
5 TABLET ORAL 3 TIMES DAILY PRN
Status: DISCONTINUED | OUTPATIENT
Start: 2024-02-28 | End: 2024-03-07 | Stop reason: HOSPADM

## 2024-02-28 RX ORDER — ALLOPURINOL 100 MG/1
100 TABLET ORAL DAILY
Status: CANCELLED | OUTPATIENT
Start: 2024-02-28

## 2024-02-28 RX ORDER — NITROGLYCERIN 0.4 MG/1
0.4 TABLET SUBLINGUAL EVERY 5 MIN PRN
Status: DISCONTINUED | OUTPATIENT
Start: 2024-02-28 | End: 2024-03-07 | Stop reason: HOSPADM

## 2024-02-28 RX ORDER — NITROGLYCERIN 0.4 MG/1
0.4 TABLET SUBLINGUAL EVERY 5 MIN PRN
Status: CANCELLED | OUTPATIENT
Start: 2024-02-28

## 2024-02-28 RX ORDER — DOCUSATE SODIUM 100 MG/1
100 CAPSULE, LIQUID FILLED ORAL 2 TIMES DAILY
Status: CANCELLED | OUTPATIENT
Start: 2024-02-28

## 2024-02-28 RX ORDER — ACETAMINOPHEN 325 MG/1
650 TABLET ORAL EVERY 4 HOURS PRN
Status: DISCONTINUED | OUTPATIENT
Start: 2024-02-28 | End: 2024-02-28 | Stop reason: SDUPTHER

## 2024-02-28 RX ORDER — PRASUGREL 10 MG/1
10 TABLET, FILM COATED ORAL DAILY
Status: DISCONTINUED | OUTPATIENT
Start: 2024-02-28 | End: 2024-02-28

## 2024-02-28 RX ORDER — LIDOCAINE 4 G/G
1 PATCH TOPICAL DAILY
Status: CANCELLED | OUTPATIENT
Start: 2024-02-28

## 2024-02-28 RX ORDER — SODIUM BICARBONATE 650 MG/1
1300 TABLET ORAL 3 TIMES DAILY
Status: CANCELLED | OUTPATIENT
Start: 2024-02-28

## 2024-02-28 RX ORDER — ASPIRIN 81 MG/1
81 TABLET, CHEWABLE ORAL DAILY
Status: DISCONTINUED | OUTPATIENT
Start: 2024-02-28 | End: 2024-02-28

## 2024-02-28 RX ORDER — GABAPENTIN 300 MG/1
300 CAPSULE ORAL 2 TIMES DAILY
Status: CANCELLED | OUTPATIENT
Start: 2024-02-28

## 2024-02-28 RX ORDER — DOCUSATE SODIUM 100 MG/1
100 CAPSULE, LIQUID FILLED ORAL 2 TIMES DAILY
Status: DISCONTINUED | OUTPATIENT
Start: 2024-02-28 | End: 2024-03-07 | Stop reason: HOSPADM

## 2024-02-28 RX ORDER — MIDODRINE HYDROCHLORIDE 5 MG/1
5 TABLET ORAL PRN
Status: CANCELLED | OUTPATIENT
Start: 2024-02-28

## 2024-02-28 RX ORDER — LIDOCAINE 4 G/G
1 PATCH TOPICAL DAILY
Status: DISCONTINUED | OUTPATIENT
Start: 2024-02-28 | End: 2024-03-07 | Stop reason: HOSPADM

## 2024-02-28 RX ORDER — SODIUM CHLORIDE 9 MG/ML
INJECTION, SOLUTION INTRAVENOUS PRN
Status: DISCONTINUED | OUTPATIENT
Start: 2024-02-28 | End: 2024-03-07 | Stop reason: HOSPADM

## 2024-02-28 RX ORDER — POLYETHYLENE GLYCOL 3350 17 G/17G
17 POWDER, FOR SOLUTION ORAL DAILY PRN
Status: DISCONTINUED | OUTPATIENT
Start: 2024-02-28 | End: 2024-03-07 | Stop reason: HOSPADM

## 2024-02-28 RX ORDER — PRASUGREL 10 MG/1
10 TABLET, FILM COATED ORAL DAILY
Status: DISCONTINUED | OUTPATIENT
Start: 2024-02-29 | End: 2024-03-07 | Stop reason: HOSPADM

## 2024-02-28 RX ORDER — VENLAFAXINE HYDROCHLORIDE 75 MG/1
75 CAPSULE, EXTENDED RELEASE ORAL
Status: DISCONTINUED | OUTPATIENT
Start: 2024-02-29 | End: 2024-03-07 | Stop reason: HOSPADM

## 2024-02-28 RX ORDER — CARBOXYMETHYLCELLULOSE SODIUM 10 MG/ML
1 GEL OPHTHALMIC PRN
Status: DISCONTINUED | OUTPATIENT
Start: 2024-02-28 | End: 2024-03-07 | Stop reason: HOSPADM

## 2024-02-28 RX ORDER — SODIUM BICARBONATE 650 MG/1
1300 TABLET ORAL 3 TIMES DAILY
Status: DISCONTINUED | OUTPATIENT
Start: 2024-02-28 | End: 2024-03-07 | Stop reason: HOSPADM

## 2024-02-28 RX ORDER — SODIUM CHLORIDE 0.9 % (FLUSH) 0.9 %
10 SYRINGE (ML) INJECTION PRN
Status: DISCONTINUED | OUTPATIENT
Start: 2024-02-28 | End: 2024-03-07 | Stop reason: HOSPADM

## 2024-02-28 RX ORDER — BIVALIRUDIN 250 MG/5ML
INJECTION, POWDER, LYOPHILIZED, FOR SOLUTION INTRAVENOUS PRN
Status: DISCONTINUED | OUTPATIENT
Start: 2024-02-28 | End: 2024-02-28 | Stop reason: HOSPADM

## 2024-02-28 RX ORDER — ACETAMINOPHEN 650 MG/1
650 SUPPOSITORY RECTAL EVERY 6 HOURS PRN
Status: DISCONTINUED | OUTPATIENT
Start: 2024-02-28 | End: 2024-03-07 | Stop reason: HOSPADM

## 2024-02-28 RX ORDER — POTASSIUM CHLORIDE 750 MG/1
10 CAPSULE, EXTENDED RELEASE ORAL 2 TIMES DAILY
Status: CANCELLED | OUTPATIENT
Start: 2024-02-28

## 2024-02-28 RX ORDER — ACETAMINOPHEN 325 MG/1
650 TABLET ORAL EVERY 6 HOURS PRN
Status: CANCELLED | OUTPATIENT
Start: 2024-02-28

## 2024-02-28 RX ORDER — INSULIN GLARGINE 100 [IU]/ML
42 INJECTION, SOLUTION SUBCUTANEOUS NIGHTLY
Status: CANCELLED | OUTPATIENT
Start: 2024-02-28

## 2024-02-28 RX ORDER — CARVEDILOL 3.12 MG/1
3.12 TABLET ORAL 2 TIMES DAILY
Status: CANCELLED | OUTPATIENT
Start: 2024-02-28

## 2024-02-28 RX ORDER — INSULIN LISPRO 100 [IU]/ML
0-4 INJECTION, SOLUTION INTRAVENOUS; SUBCUTANEOUS NIGHTLY
Status: DISCONTINUED | OUTPATIENT
Start: 2024-02-28 | End: 2024-03-07 | Stop reason: HOSPADM

## 2024-02-28 RX ORDER — CHOLECALCIFEROL (VITAMIN D3) 125 MCG
10 CAPSULE ORAL NIGHTLY PRN
Status: DISCONTINUED | OUTPATIENT
Start: 2024-02-28 | End: 2024-03-07 | Stop reason: HOSPADM

## 2024-02-28 RX ORDER — ASPIRIN 81 MG/1
81 TABLET, CHEWABLE ORAL DAILY
Status: DISCONTINUED | OUTPATIENT
Start: 2024-02-29 | End: 2024-03-07 | Stop reason: HOSPADM

## 2024-02-28 RX ORDER — ONDANSETRON 2 MG/ML
4 INJECTION INTRAMUSCULAR; INTRAVENOUS EVERY 6 HOURS PRN
Status: DISCONTINUED | OUTPATIENT
Start: 2024-02-28 | End: 2024-03-07 | Stop reason: HOSPADM

## 2024-02-28 RX ORDER — ECHINACEA PURPUREA EXTRACT 125 MG
1 TABLET ORAL EVERY 4 HOURS PRN
Status: DISCONTINUED | OUTPATIENT
Start: 2024-02-28 | End: 2024-03-07 | Stop reason: HOSPADM

## 2024-02-28 RX ORDER — SODIUM CHLORIDE 0.9 % (FLUSH) 0.9 %
5-40 SYRINGE (ML) INJECTION EVERY 12 HOURS SCHEDULED
Status: DISCONTINUED | OUTPATIENT
Start: 2024-02-28 | End: 2024-03-07 | Stop reason: HOSPADM

## 2024-02-28 RX ORDER — MIDAZOLAM HYDROCHLORIDE 1 MG/ML
INJECTION INTRAMUSCULAR; INTRAVENOUS PRN
Status: DISCONTINUED | OUTPATIENT
Start: 2024-02-28 | End: 2024-02-28 | Stop reason: HOSPADM

## 2024-02-28 RX ORDER — BUMETANIDE 1 MG/1
3 TABLET ORAL 2 TIMES DAILY
Status: DISCONTINUED | OUTPATIENT
Start: 2024-02-28 | End: 2024-03-07 | Stop reason: HOSPADM

## 2024-02-28 RX ORDER — INSULIN LISPRO 100 [IU]/ML
10 INJECTION, SOLUTION INTRAVENOUS; SUBCUTANEOUS ONCE
Status: COMPLETED | OUTPATIENT
Start: 2024-02-28 | End: 2024-02-28

## 2024-02-28 RX ORDER — ACETAMINOPHEN 325 MG/1
650 TABLET ORAL EVERY 6 HOURS PRN
Status: DISCONTINUED | OUTPATIENT
Start: 2024-02-28 | End: 2024-02-28

## 2024-02-28 RX ORDER — ACETAMINOPHEN 325 MG/1
650 TABLET ORAL EVERY 6 HOURS PRN
Status: DISCONTINUED | OUTPATIENT
Start: 2024-02-28 | End: 2024-03-07 | Stop reason: HOSPADM

## 2024-02-28 RX ORDER — ATORVASTATIN CALCIUM 80 MG/1
80 TABLET, FILM COATED ORAL NIGHTLY
Status: DISCONTINUED | OUTPATIENT
Start: 2024-02-28 | End: 2024-03-07 | Stop reason: HOSPADM

## 2024-02-28 RX ORDER — INSULIN LISPRO 100 [IU]/ML
20 INJECTION, SOLUTION INTRAVENOUS; SUBCUTANEOUS
Status: CANCELLED | OUTPATIENT
Start: 2024-02-28

## 2024-02-28 RX ORDER — BUSPIRONE HYDROCHLORIDE 10 MG/1
10 TABLET ORAL 3 TIMES DAILY
Status: CANCELLED | OUTPATIENT
Start: 2024-02-28

## 2024-02-28 RX ORDER — VENLAFAXINE HYDROCHLORIDE 75 MG/1
75 CAPSULE, EXTENDED RELEASE ORAL
Status: CANCELLED | OUTPATIENT
Start: 2024-02-29

## 2024-02-28 RX ORDER — BUSPIRONE HYDROCHLORIDE 10 MG/1
10 TABLET ORAL 3 TIMES DAILY
Status: DISCONTINUED | OUTPATIENT
Start: 2024-02-28 | End: 2024-03-07 | Stop reason: HOSPADM

## 2024-02-28 RX ORDER — PRASUGREL 10 MG/1
TABLET, FILM COATED ORAL PRN
Status: DISCONTINUED | OUTPATIENT
Start: 2024-02-28 | End: 2024-02-28 | Stop reason: HOSPADM

## 2024-02-28 RX ORDER — LIDOCAINE 40 MG/G
CREAM TOPICAL PRN
Status: DISCONTINUED | OUTPATIENT
Start: 2024-02-28 | End: 2024-03-07 | Stop reason: HOSPADM

## 2024-02-28 RX ORDER — SODIUM CHLORIDE 0.9 % (FLUSH) 0.9 %
5-40 SYRINGE (ML) INJECTION PRN
Status: DISCONTINUED | OUTPATIENT
Start: 2024-02-28 | End: 2024-03-07 | Stop reason: HOSPADM

## 2024-02-28 RX ORDER — HEPARIN SODIUM 5000 [USP'U]/ML
5000 INJECTION, SOLUTION INTRAVENOUS; SUBCUTANEOUS EVERY 8 HOURS SCHEDULED
Status: DISCONTINUED | OUTPATIENT
Start: 2024-02-29 | End: 2024-03-07 | Stop reason: HOSPADM

## 2024-02-28 RX ORDER — BUMETANIDE 1 MG/1
3 TABLET ORAL 2 TIMES DAILY
Status: CANCELLED | OUTPATIENT
Start: 2024-02-28

## 2024-02-28 RX ORDER — ONDANSETRON 4 MG/1
4 TABLET, ORALLY DISINTEGRATING ORAL EVERY 8 HOURS PRN
Status: DISCONTINUED | OUTPATIENT
Start: 2024-02-28 | End: 2024-03-07 | Stop reason: HOSPADM

## 2024-02-28 RX ORDER — ASPIRIN 81 MG/1
81 TABLET, CHEWABLE ORAL DAILY
Status: CANCELLED | OUTPATIENT
Start: 2024-02-28

## 2024-02-28 RX ORDER — INSULIN GLARGINE 100 [IU]/ML
30 INJECTION, SOLUTION SUBCUTANEOUS 2 TIMES DAILY
Status: DISCONTINUED | OUTPATIENT
Start: 2024-02-28 | End: 2024-03-01

## 2024-02-28 RX ORDER — INSULIN GLARGINE 100 [IU]/ML
42 INJECTION, SOLUTION SUBCUTANEOUS NIGHTLY
Status: DISCONTINUED | OUTPATIENT
Start: 2024-02-28 | End: 2024-02-28

## 2024-02-28 RX ORDER — FENTANYL CITRATE 50 UG/ML
INJECTION, SOLUTION INTRAMUSCULAR; INTRAVENOUS PRN
Status: DISCONTINUED | OUTPATIENT
Start: 2024-02-28 | End: 2024-02-28 | Stop reason: HOSPADM

## 2024-02-28 RX ORDER — LIDOCAINE AND PRILOCAINE 25; 25 MG/G; MG/G
CREAM TOPICAL PRN
Status: CANCELLED | OUTPATIENT
Start: 2024-02-28

## 2024-02-28 RX ORDER — ASPIRIN 325 MG
TABLET ORAL PRN
Status: DISCONTINUED | OUTPATIENT
Start: 2024-02-28 | End: 2024-02-28 | Stop reason: HOSPADM

## 2024-02-28 RX ADMIN — SODIUM BICARBONATE 1300 MG: 648 TABLET ORAL at 23:09

## 2024-02-28 RX ADMIN — SODIUM CHLORIDE, PRESERVATIVE FREE 10 ML: 5 INJECTION INTRAVENOUS at 11:27

## 2024-02-28 RX ADMIN — DOCUSATE SODIUM 100 MG: 100 CAPSULE, LIQUID FILLED ORAL at 23:17

## 2024-02-28 RX ADMIN — BUSPIRONE HYDROCHLORIDE 10 MG: 10 TABLET ORAL at 23:08

## 2024-02-28 RX ADMIN — SEVELAMER CARBONATE 1600 MG: 800 TABLET, FILM COATED ORAL at 23:17

## 2024-02-28 RX ADMIN — POTASSIUM CHLORIDE 10 MEQ: 10 CAPSULE, COATED, EXTENDED RELEASE ORAL at 23:08

## 2024-02-28 RX ADMIN — ALLOPURINOL 100 MG: 100 TABLET ORAL at 23:17

## 2024-02-28 RX ADMIN — Medication 10.5 MG: at 01:07

## 2024-02-28 RX ADMIN — SODIUM CHLORIDE, PRESERVATIVE FREE 10 ML: 5 INJECTION INTRAVENOUS at 23:18

## 2024-02-28 RX ADMIN — GABAPENTIN 300 MG: 300 CAPSULE ORAL at 23:09

## 2024-02-28 RX ADMIN — INSULIN LISPRO 10 UNITS: 100 INJECTION, SOLUTION INTRAVENOUS; SUBCUTANEOUS at 13:05

## 2024-02-28 RX ADMIN — INSULIN GLARGINE 36 UNITS: 100 INJECTION, SOLUTION SUBCUTANEOUS at 13:13

## 2024-02-28 RX ADMIN — BUMETANIDE 3 MG: 1 TABLET ORAL at 23:08

## 2024-02-28 RX ADMIN — INSULIN GLARGINE 30 UNITS: 100 INJECTION, SOLUTION SUBCUTANEOUS at 23:10

## 2024-02-28 RX ADMIN — Medication 10 MG: at 23:21

## 2024-02-28 RX ADMIN — ATORVASTATIN CALCIUM 80 MG: 80 TABLET, FILM COATED ORAL at 23:09

## 2024-02-28 RX ADMIN — CARVEDILOL 3.12 MG: 3.12 TABLET, FILM COATED ORAL at 23:08

## 2024-02-28 RX ADMIN — HEPARIN SODIUM 5000 UNITS: 5000 INJECTION INTRAVENOUS; SUBCUTANEOUS at 06:26

## 2024-02-28 ASSESSMENT — PAIN DESCRIPTION - PAIN TYPE: TYPE: CHRONIC PAIN

## 2024-02-28 ASSESSMENT — PAIN DESCRIPTION - DESCRIPTORS: DESCRIPTORS: DISCOMFORT

## 2024-02-28 ASSESSMENT — PAIN - FUNCTIONAL ASSESSMENT: PAIN_FUNCTIONAL_ASSESSMENT: PREVENTS OR INTERFERES SOME ACTIVE ACTIVITIES AND ADLS

## 2024-02-28 ASSESSMENT — PAIN DESCRIPTION - ORIENTATION: ORIENTATION: RIGHT;LOWER

## 2024-02-28 ASSESSMENT — PAIN SCALES - GENERAL: PAINLEVEL_OUTOF10: 6

## 2024-02-28 ASSESSMENT — PAIN DESCRIPTION - LOCATION: LOCATION: BACK

## 2024-02-28 NOTE — H&P
[facility]  Procedure History and Physical Update          Patient Name: Estefany Garcia  MRN:    [id number]  YOB: 1960  Date of evaluation:  2/28/2024    Procedure:    Cardiac cath +/- PCI    Indication for procedure:  Abnormal stress test      Please refer to the office note completed by Kayli Durant CNP on 02/28/2024 in the medical record and note that:    [x] I have examined the patient and reviewed the H&P/Consult and there are no changes to be made to the assessment or plan.    [] I have examined the patient and reviewed the H&P/Consult and have noted the following changes:    Past Medical History:   Diagnosis Date    Arthritis     Backache, unspecified     CAD (coronary artery disease)     Cerebral artery occlusion with cerebral infarction (HCC)     CHF (congestive heart failure) (HCC)     Chronic kidney disease     Coronary atherosclerosis of artery bypass graft     COVID 01/31/2022    Cramp of limb     Epistaxis 03/05/2023    Gallstones     Hemodialysis patient (HCC)     MONDAY WEDNESDAY AND FRIDAYS  /  [facility] [place]    Hyperlipidemia     Hypertension     Insomnia     Neuromuscular disorder (HCC)     Pneumonia     Psychiatric problem     Thyroid disease     Type II or unspecified type diabetes mellitus with renal manifestations, not stated as uncontrolled(250.40)     Type II or unspecified type diabetes mellitus without mention of complication, not stated as uncontrolled     Unspecified vitamin D deficiency        Past Surgical History:   Procedure Laterality Date    ANKLE FRACTURE SURGERY      AV FISTULA CREATION  12/14/2021    BACK SURGERY      BREAST SURGERY      CARDIAC CATHETERIZATION  2005    MVD  /  CT CONSULT    CARDIAC PROCEDURE N/A 11/03/2023    ron / Coronary angiography / op scmh performed by Jairo Hurley MD at [facility] CARDIAC CATH LAB    CARDIAC PROCEDURE N/A 11/15/2023    ron / Percutaneous coronary intervention performed by Jairo Hurley MD at [facility]

## 2024-02-28 NOTE — CARE COORDINATION
Writer received message from Karol with the [facility] that facility can accept this pt.    Writer me with pt to discuss this. Pt states she is still interested in ARU. Writer informed bedside RN Rneuka 2/27 that she would have to reach out for the order for a PM&R consult.    Writer placed call to bedside DEDE Syed regarding this. Yahaira states she will reach out for this order.  Electronically signed by AZ Saez on 2/28/2024 at 9:16 AM

## 2024-02-28 NOTE — PROGRESS NOTES
[facility]  Progress Note                   Date:   2/28/2024  Patient name: Estefany Garcia  Date of admission:  2/23/2024 10:10 PM  MRN:   [id number]  YOB: 1960  PCP: Zulma Payne APRN - CNP    Reason for Admission: Chest pain [R07.9]  Elevated troponin [R79.89]  Bilateral edema of lower extremity [R60.0]    Subjective:       Clinical Changes /Abnormalities: Seen & examined in room up in bedside chair. Denies any CP at present but states she did have some more pressure overnight, resolved on its own. No acute CV issues/concerns overnight. Labs, vitals, & tele reviewed. NPO.     Review of Systems    Medications:   Scheduled Meds:   allopurinol  100 mg Oral Daily    aspirin  81 mg Oral Daily    atorvastatin  80 mg Oral Nightly    bumetanide  3 mg Oral BID    busPIRone  10 mg Oral TID    polycarbophil  1,250 mg Oral Daily    carvedilol  3.125 mg Oral BID    docusate sodium  100 mg Oral BID    epoetin raphael-epbx  3,000 Units SubCUTAneous Once per day on Mon Wed Fri    gabapentin  300 mg Oral BID    insulin lispro  20 Units SubCUTAneous TID WC    insulin glargine  72 Units SubCUTAneous QAM    insulin glargine  42 Units SubCUTAneous Nightly    lidocaine  1 patch TransDERmal Daily    magnesium oxide  400 mg Oral Daily    prasugrel  10 mg Oral Daily    sevelamer  1,600 mg Oral TID WC    sodium bicarbonate  1,300 mg Oral TID    venlafaxine  75 mg Oral Daily with breakfast    sodium chloride flush  5-40 mL IntraVENous 2 times per day    heparin (porcine)  5,000 Units SubCUTAneous 3 times per day    insulin lispro  0-16 Units SubCUTAneous TID WC    insulin lispro  0-4 Units SubCUTAneous Nightly     Continuous Infusions:   sodium chloride      dextrose       CBC:   Recent Labs     02/26/24  1517   HGB 12.8       BMP:    Recent Labs     02/26/24  0531      K 4.5   CL 96*   CO2 22   BUN 49*   CREATININE 4.5*   GLUCOSE 196*       Hepatic:No results for input(s): \"AST\", \"ALT\", \"ALB\",

## 2024-02-28 NOTE — PROGRESS NOTES
Physical Therapy  [facility]  Date: 24  Patient Name: Estefany Garcia       Room: -  MRN: [id number]   Account: [id number]   : 1960  (63 y.o.) Gender: female     Referring Practitioner: Madonna Aviles MD  Diagnosis: Chest pain  Past Medical History:  has a past medical history of Arthritis, Backache, unspecified, CAD (coronary artery disease), Cerebral artery occlusion with cerebral infarction (HCC), CHF (congestive heart failure) ([facility]), Chronic kidney disease, Coronary atherosclerosis of artery bypass graft, COVID, Cramp of limb, Epistaxis, Gallstones, Hemodialysis patient ([facility]), Hyperlipidemia, Hypertension, Insomnia, Neuromuscular disorder ([facility]), Pneumonia, Psychiatric problem, Thyroid disease, Type II or unspecified type diabetes mellitus with renal manifestations, not stated as uncontrolled(250.40), Type II or unspecified type diabetes mellitus without mention of complication, not stated as uncontrolled, and Unspecified vitamin D deficiency.   Past Surgical History:   has a past surgical history that includes Coronary artery bypass graft (2005); Knee arthroscopy; Carpal tunnel release; Breast surgery; Tonsillectomy; Hand surgery; Ankle fracture surgery; Cholecystectomy, open (N/A); IR TUNNELED CVC PLACE WO SQ PORT/PUMP > 5 YEARS (2021); AV fistula creation (2021); Dialysis fistula creation (Left, 2021); other surgical history (2022); back surgery; Colonoscopy; eye surgery; fracture surgery; Cardiac surgery; IR TUNNELED CVC PLACE WO SQ PORT/PUMP > 5 YEARS (2023); IR TUNNELED CVC PLACE WO SQ PORT/PUMP > 5 YEARS (Right, 2023); Dialysis Catheter Insertion (Right, 2023); other surgical history (Left, 2023); Coronary angioplasty with stent (2023); Cardiac catheterization (); Coronary angioplasty with stent (); Dialysis fistula creation (Left, 2023); IR TUNNELED CVC PLACE WO SQ PORT/PUMP > 5

## 2024-02-28 NOTE — PROGRESS NOTES
injection 3,000 Units, Once per day on   gabapentin (NEURONTIN) capsule 300 mg, BID  insulin lispro (HUMALOG) injection vial 20 Units, TID WC  insulin glargine (LANTUS) injection vial 72 Units, QAM  insulin glargine (LANTUS) injection vial 42 Units, Nightly  lidocaine 4 % external patch 1 patch, Daily  lidocaine-prilocaine (EMLA) cream, PRN  magnesium oxide (MAG-OX) tablet 400 mg, Daily  melatonin tablet 10.5 mg, Nightly PRN  midodrine (PROAMATINE) tablet 5 mg, PRN  nitroGLYCERIN (NITROSTAT) SL tablet 0.4 mg, Q5 Min PRN  Polyvinyl Alcohol-Povidone PF (REFRESH) 1.4-0.6 % ophthalmic solution 1 drop, PRN  prasugrel (EFFIENT) tablet 10 mg, Daily  sevelamer (RENVELA) tablet 1,600 mg, TID WC  sodium bicarbonate tablet 1,300 mg, TID  sodium chloride (OCEAN, BABY AYR) 0.65 % nasal spray 1 spray, Q4H PRN  venlafaxine (EFFEXOR XR) extended release capsule 75 mg, Daily with breakfast  sodium chloride flush 0.9 % injection 5-40 mL, 2 times per day  sodium chloride flush 0.9 % injection 5-40 mL, PRN  0.9 % sodium chloride infusion, PRN  ondansetron (ZOFRAN-ODT) disintegrating tablet 4 mg, Q8H PRN   Or  ondansetron (ZOFRAN) injection 4 mg, Q6H PRN  acetaminophen (TYLENOL) tablet 650 mg, Q6H PRN   Or  acetaminophen (TYLENOL) suppository 650 mg, Q6H PRN  polyethylene glycol (GLYCOLAX) packet 17 g, Daily PRN  heparin (porcine) injection 5,000 Units, 3 times per day  glucose chewable tablet 16 g, PRN  dextrose bolus 10% 125 mL, PRN   Or  dextrose bolus 10% 250 mL, PRN  glucagon injection 1 mg, PRN  dextrose 10 % infusion, Continuous PRN  insulin lispro (HUMALOG) injection vial 0-16 Units, TID WC  insulin lispro (HUMALOG) injection vial 0-4 Units, Nightly      Objective:  CURRENT TEMPERATURE:  Temp: 97.6 °F (36.4 °C)  MAXIMUM TEMPERATURE OVER 24HRS:  Temp (24hrs), Av.1 °F (36.7 °C), Min:97.3 °F (36.3 °C), Max:98.7 °F (37.1 °C)    CURRENT RESPIRATORY RATE:  Respirations: 16  CURRENT PULSE:  Pulse: 69  CURRENT BLOOD  catheterization today.    Prognosis is guarded.     Electronically signed by Cherri Landis MD on 2/28/2024 at 1:33 PM

## 2024-02-28 NOTE — PLAN OF CARE
Problem: Discharge Planning  Goal: Discharge to home or other facility with appropriate resources  2/28/2024 0445 by Dana Hammond, RN  Outcome: Progressing   Patient actively participates in ADL's and decision making regarding plan of care  Problem: Pain  Goal: Verbalizes/displays adequate comfort level or baseline comfort level  2/28/2024 0445 by Dana Hammond, RN  Outcome: Progressing   No new signs/symptoms of pain noted, pain rating < 3 on scale 0-10, pain controlled with medication/repositioning  Problem: Safety - Adult  Goal: Free from fall injury  Outcome: Progressing   No falls/injuries this shift, bed in lowest position, brakes on, bed alarm on, call light in reach, side rails up x2  Problem: ABCDS Injury Assessment  Goal: Absence of physical injury  Outcome: Progressing   No falls/injuries this shift, bed in lowest position, brakes on, bed alarm on, call light in reach, side rails up x2

## 2024-02-28 NOTE — PROGRESS NOTES
02/28/24 1115   Encounter Summary   Encounter Overview/Reason  Volunteer Encounter   Service Provided For: Patient   Referral/Consult From: [facility]   Last Encounter  02/28/24   Complexity of Encounter Low   Spiritual/Emotional needs   Type Spiritual Support   Rituals, Rites and Sacraments   Type Alevism Communion   Assessment/Intervention/Outcome   Intervention Prayer (assurance of)/[place]

## 2024-02-28 NOTE — DISCHARGE SUMMARY
Family Medicine Discharge Summary    Estefany Garcia  :  1960  MRN:  [id number]    Admit date:  2024  Discharge date:  2024    Admitting Physician:  Madonna Aviles MD    Discharge Diagnoses:    Patient Active Problem List   Diagnosis    Atherosclerosis of coronary artery bypass graft of native heart with angina pectoris ([facility])    Acute kidney injury superimposed on CKD ([facility])    Acute on chronic diastolic heart failure ([facility])    Diabetic polyneuropathy associated with type 2 diabetes mellitus ([facility])    History of coronary artery bypass graft    Iron deficiency anemia    Spinal stenosis of lumbar region with neurogenic claudication    Mixed hyperlipidemia    CKD (chronic kidney disease) stage 4, GFR 15-29 ml/min ([facility])    Type 2 diabetes 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, with long-term current use of insulin ([facility])    Obesity, Class II, BMI 35-39.9    Thyroid nodule greater than or equal to 1 cm in diameter incidentally noted on imaging study    Primary hypertension    Anemia of chronic disease    Chronic ischemic heart disease    Ischemic stroke of frontal lobe ([facility])    Morbid obesity with BMI of 40.0-44.9, adult ([facility])    Disequilibrium syndrome    Anxiety    Chronic midline low back pain with bilateral sciatica    Acute thoracic back pain    COVID    Delirium due to another medical condition    Cerebral hypoperfusion    Immature arteriovenous fistula ([facility])    History of fusion of cervical spine    Depression with anxiety    Vancomycin resistant Enterococcus UTI    ESRD on hemodialysis ([facility])    Dialysis disequilibrium syndrome    Acute cystitis without hematuria    VRE infection (vancomycin resistant Enterococcus)    Infection due to ESBL-producing Klebsiella pneumoniae    Class 2 severe obesity due to excess calories with serious comorbidity and body mass index (BMI) of 35.0 to 35.9 in adult ([facility])    Type 2 diabetes mellitus with hyperglycemia, with long-term current use of  the skin 3 times daily, before meals.  Additionally sliding scale coverage as needed 3 times a day: for blood sugar  no additional insulin.  Sugar 140-199 give 3 units.  Sugar 200-249 give 6 units.  Sugar 250-299 give 9 units.  Sugar 300-3 49 give 12 units.  Sugar 3  give 15 units.  Sugar over 400 give 18 units.    Maximum allowable amount 103 units daily     polyvinyl alcohol 1.4 % ophthalmic solution  Commonly known as: LIQUIFILM TEARS     * potassium chloride 10 MEQ extended release capsule  Commonly known as: MICRO-K     * potassium chloride 20 MEQ packet  Commonly known as: KLOR-CON  Take 20 mEq by mouth daily     prasugrel 10 MG Tabs  Commonly known as: EFFIENT  Take 1 tablet by mouth daily     sevelamer 800 MG tablet  Commonly known as: Renvela  Take 2 tablets by mouth 3 times daily (with meals)     sodium bicarbonate 650 MG tablet  Take 2 tablets by mouth twice daily     sodium chloride 0.65 % nasal spray  Commonly known as: OCEAN, BABY AYR  1 spray by Nasal route every 4 hours as needed for Congestion     venlafaxine 75 MG extended release capsule  Commonly known as: EFFEXOR XR  TAKE 1 CAPSULE BY MOUTH ONCE DAILY WITH BREAKFAST           * This list has 2 medication(s) that are the same as other medications prescribed for you. Read the directions carefully, and ask your doctor or other care provider to review them with you.                  Consults:  nephrology, cardiology    Significant Diagnostic Studies:  labs, radiology, stress      Treatments:   dialysis, supportive treatments    Disposition:   transfer to [facility]      Signed:  Madonna Aviles MD, FAAFP  2/28/2024, 12:08 PM

## 2024-02-28 NOTE — PROGRESS NOTES
Patient known from previous admissions.  SC visit with patient and her two sisters.  Listening presence, support and words of affirmation provided as patient talked about her medical testing and medical issues.       02/27/24 2008   Encounter Summary   Encounter Overview/Reason  Spiritual/Emotional Needs   Service Provided For: Patient and family together   Referral/Consult From: [facility]   Support System Family members   Last Encounter  02/27/24   Complexity of Encounter Moderate   Begin Time 1900   End Time  1935   Total Time Calculated 35 min   Spiritual/Emotional needs   Type Spiritual Support   Grief, Loss, and Adjustments   Type Adjustment to illness   Assessment/Intervention/Outcome   Assessment Coping;Hopeful;Powerlessness   Intervention Active listening;Discussed illness injury and it’s impact;Explored/Affirmed feelings, thoughts, concerns;Sustaining Presence/Ministry of presence   Outcome Comfort;Coping;Engaged in conversation;Expressed feelings, needs, and concerns;Expressed Gratitude;Receptive

## 2024-02-28 NOTE — PROGRESS NOTES
RN called report over to Fabiola at [facility]'s cath lab. Patient being picked up by transport right now.

## 2024-02-28 NOTE — CARE COORDINATION
Writer received call from Noy with [address].  Can accept this pt if pt is discharging home.    Writer informed Noy that pt wants to go to SNF or ARU at this time.  Electronically signed by AZ Saez on 2/28/2024 at 12:44 PM

## 2024-02-28 NOTE — PROGRESS NOTES
RN messaged , \"Patient is scheduled for a heart cath today. Do you want me to hold her insulin? Her dexacom says blood glucose is 240.     Also patient is interested in ARU. Are you able to put in a PMR consult?\"  said she would round on patient shortly.     said to give her 36 units of lantus (half dose) and to give her 10 units of her scheduled humalog insulin.

## 2024-02-28 NOTE — PROGRESS NOTES
Transport was set up with [facility] for the patient to go to [facility] for a heart cath. ETA is 1:30-2pm.

## 2024-02-29 ENCOUNTER — HOSPITAL ENCOUNTER (OUTPATIENT)
Dept: CARDIAC REHAB | Age: 64
Setting detail: THERAPIES SERIES
Discharge: HOME | End: 2024-02-29
Payer: COMMERCIAL

## 2024-02-29 PROBLEM — Z86.39 HX OF TYPE 2 DIABETES MELLITUS: Status: ACTIVE | Noted: 2024-02-29

## 2024-02-29 PROBLEM — R60.0 BILATERAL EDEMA OF LOWER EXTREMITY: Status: ACTIVE | Noted: 2024-02-29

## 2024-02-29 LAB
ANION GAP SERPL CALCULATED.3IONS-SCNC: 15 MMOL/L (ref 9–17)
BASOPHILS # BLD: 0.05 K/UL (ref 0–0.2)
BASOPHILS NFR BLD: 1 % (ref 0–2)
BUN SERPL-MCNC: 59 MG/DL (ref 8–23)
CALCIUM SERPL-MCNC: 9 MG/DL (ref 8.6–10.4)
CHLORIDE SERPL-SCNC: 95 MMOL/L (ref 98–107)
CO2 SERPL-SCNC: 22 MMOL/L (ref 20–31)
CREAT SERPL-MCNC: 4.9 MG/DL (ref 0.5–0.9)
EKG ATRIAL RATE: 60 BPM
EKG P AXIS: 41 DEGREES
EKG P-R INTERVAL: 164 MS
EKG Q-T INTERVAL: 462 MS
EKG QRS DURATION: 100 MS
EKG QTC CALCULATION (BAZETT): 462 MS
EKG R AXIS: 39 DEGREES
EKG T AXIS: -9 DEGREES
EKG VENTRICULAR RATE: 60 BPM
EOSINOPHIL # BLD: 0.24 K/UL (ref 0–0.44)
EOSINOPHILS RELATIVE PERCENT: 4 % (ref 1–4)
ERYTHROCYTE [DISTWIDTH] IN BLOOD BY AUTOMATED COUNT: 16.8 % (ref 11.8–14.4)
GFR SERPL CREATININE-BSD FRML MDRD: 9 ML/MIN/1.73M2
GLUCOSE BLD-MCNC: 203 MG/DL (ref 65–105)
GLUCOSE BLD-MCNC: 275 MG/DL (ref 65–105)
GLUCOSE BLD-MCNC: 345 MG/DL (ref 65–105)
GLUCOSE SERPL-MCNC: 220 MG/DL (ref 70–99)
HCT VFR BLD AUTO: 35.2 % (ref 36.3–47.1)
HGB BLD-MCNC: 12.1 G/DL (ref 11.9–15.1)
IMM GRANULOCYTES # BLD AUTO: 0.03 K/UL (ref 0–0.3)
IMM GRANULOCYTES NFR BLD: 1 %
LYMPHOCYTES NFR BLD: 1.02 K/UL (ref 1.1–3.7)
LYMPHOCYTES RELATIVE PERCENT: 16 % (ref 24–43)
MCH RBC QN AUTO: 35.9 PG (ref 25.2–33.5)
MCHC RBC AUTO-ENTMCNC: 34.4 G/DL (ref 28.4–34.8)
MCV RBC AUTO: 104.5 FL (ref 82.6–102.9)
MONOCYTES NFR BLD: 0.57 K/UL (ref 0.1–1.2)
MONOCYTES NFR BLD: 9 % (ref 3–12)
NEUTROPHILS NFR BLD: 69 % (ref 36–65)
NEUTS SEG NFR BLD: 4.52 K/UL (ref 1.5–8.1)
NRBC BLD-RTO: 0 PER 100 WBC
PLATELET # BLD AUTO: 125 K/UL (ref 138–453)
PMV BLD AUTO: 10.5 FL (ref 8.1–13.5)
POTASSIUM SERPL-SCNC: 4.6 MMOL/L (ref 3.7–5.3)
RBC # BLD AUTO: 3.37 M/UL (ref 3.95–5.11)
RBC # BLD: ABNORMAL 10*6/UL
RBC # BLD: ABNORMAL 10*6/UL
SODIUM SERPL-SCNC: 132 MMOL/L (ref 135–144)
WBC OTHER # BLD: 6.4 K/UL (ref 3.5–11.3)

## 2024-02-29 PROCEDURE — 97116 GAIT TRAINING THERAPY: CPT

## 2024-02-29 PROCEDURE — 97110 THERAPEUTIC EXERCISES: CPT

## 2024-02-29 PROCEDURE — 93005 ELECTROCARDIOGRAM TRACING: CPT | Performed by: INTERNAL MEDICINE

## 2024-02-29 PROCEDURE — 97535 SELF CARE MNGMENT TRAINING: CPT

## 2024-02-29 PROCEDURE — 93005 ELECTROCARDIOGRAM TRACING: CPT | Performed by: STUDENT IN AN ORGANIZED HEALTH CARE EDUCATION/TRAINING PROGRAM

## 2024-02-29 PROCEDURE — 82947 ASSAY GLUCOSE BLOOD QUANT: CPT

## 2024-02-29 PROCEDURE — 80048 BASIC METABOLIC PNL TOTAL CA: CPT

## 2024-02-29 PROCEDURE — 90935 HEMODIALYSIS ONE EVALUATION: CPT

## 2024-02-29 PROCEDURE — 99233 SBSQ HOSP IP/OBS HIGH 50: CPT | Performed by: INTERNAL MEDICINE

## 2024-02-29 PROCEDURE — 6360000002 HC RX W HCPCS: Performed by: STUDENT IN AN ORGANIZED HEALTH CARE EDUCATION/TRAINING PROGRAM

## 2024-02-29 PROCEDURE — 97162 PT EVAL MOD COMPLEX 30 MIN: CPT

## 2024-02-29 PROCEDURE — 6370000000 HC RX 637 (ALT 250 FOR IP): Performed by: FAMILY MEDICINE

## 2024-02-29 PROCEDURE — 2580000003 HC RX 258: Performed by: STUDENT IN AN ORGANIZED HEALTH CARE EDUCATION/TRAINING PROGRAM

## 2024-02-29 PROCEDURE — 6370000000 HC RX 637 (ALT 250 FOR IP): Performed by: STUDENT IN AN ORGANIZED HEALTH CARE EDUCATION/TRAINING PROGRAM

## 2024-02-29 PROCEDURE — 85025 COMPLETE CBC W/AUTO DIFF WBC: CPT

## 2024-02-29 PROCEDURE — 2060000000 HC ICU INTERMEDIATE R&B

## 2024-02-29 PROCEDURE — 97166 OT EVAL MOD COMPLEX 45 MIN: CPT

## 2024-02-29 PROCEDURE — 36415 COLL VENOUS BLD VENIPUNCTURE: CPT

## 2024-02-29 PROCEDURE — 97530 THERAPEUTIC ACTIVITIES: CPT

## 2024-02-29 PROCEDURE — 93010 ELECTROCARDIOGRAM REPORT: CPT | Performed by: INTERNAL MEDICINE

## 2024-02-29 RX ADMIN — BUMETANIDE 3 MG: 1 TABLET ORAL at 20:55

## 2024-02-29 RX ADMIN — SODIUM BICARBONATE 1300 MG: 648 TABLET ORAL at 17:15

## 2024-02-29 RX ADMIN — INSULIN GLARGINE 30 UNITS: 100 INJECTION, SOLUTION SUBCUTANEOUS at 09:00

## 2024-02-29 RX ADMIN — SODIUM BICARBONATE 1300 MG: 648 TABLET ORAL at 20:55

## 2024-02-29 RX ADMIN — VENLAFAXINE HYDROCHLORIDE 75 MG: 75 CAPSULE, EXTENDED RELEASE ORAL at 08:16

## 2024-02-29 RX ADMIN — BUSPIRONE HYDROCHLORIDE 10 MG: 10 TABLET ORAL at 20:55

## 2024-02-29 RX ADMIN — GABAPENTIN 300 MG: 300 CAPSULE ORAL at 20:55

## 2024-02-29 RX ADMIN — SEVELAMER CARBONATE 1600 MG: 800 TABLET, FILM COATED ORAL at 08:15

## 2024-02-29 RX ADMIN — DOCUSATE SODIUM 100 MG: 100 CAPSULE, LIQUID FILLED ORAL at 20:55

## 2024-02-29 RX ADMIN — ATORVASTATIN CALCIUM 80 MG: 80 TABLET, FILM COATED ORAL at 20:56

## 2024-02-29 RX ADMIN — HEPARIN SODIUM 5000 UNITS: 5000 INJECTION INTRAVENOUS; SUBCUTANEOUS at 07:20

## 2024-02-29 RX ADMIN — HEPARIN SODIUM 5000 UNITS: 5000 INJECTION INTRAVENOUS; SUBCUTANEOUS at 17:16

## 2024-02-29 RX ADMIN — SODIUM CHLORIDE, PRESERVATIVE FREE 10 ML: 5 INJECTION INTRAVENOUS at 21:06

## 2024-02-29 RX ADMIN — ACETAMINOPHEN 650 MG: 325 TABLET ORAL at 21:47

## 2024-02-29 RX ADMIN — HEPARIN SODIUM 5000 UNITS: 5000 INJECTION INTRAVENOUS; SUBCUTANEOUS at 20:56

## 2024-02-29 RX ADMIN — BUSPIRONE HYDROCHLORIDE 10 MG: 10 TABLET ORAL at 17:15

## 2024-02-29 RX ADMIN — ALLOPURINOL 100 MG: 100 TABLET ORAL at 08:16

## 2024-02-29 RX ADMIN — SEVELAMER CARBONATE 1600 MG: 800 TABLET, FILM COATED ORAL at 17:16

## 2024-02-29 RX ADMIN — ASPIRIN 81 MG: 81 TABLET, CHEWABLE ORAL at 09:00

## 2024-02-29 RX ADMIN — INSULIN LISPRO 4 UNITS: 100 INJECTION, SOLUTION INTRAVENOUS; SUBCUTANEOUS at 20:58

## 2024-02-29 RX ADMIN — POTASSIUM CHLORIDE 10 MEQ: 10 CAPSULE, COATED, EXTENDED RELEASE ORAL at 08:16

## 2024-02-29 RX ADMIN — GABAPENTIN 300 MG: 300 CAPSULE ORAL at 09:43

## 2024-02-29 RX ADMIN — INSULIN GLARGINE 30 UNITS: 100 INJECTION, SOLUTION SUBCUTANEOUS at 20:56

## 2024-02-29 RX ADMIN — INSULIN LISPRO 4 UNITS: 100 INJECTION, SOLUTION INTRAVENOUS; SUBCUTANEOUS at 17:16

## 2024-02-29 RX ADMIN — SODIUM CHLORIDE, PRESERVATIVE FREE 10 ML: 5 INJECTION INTRAVENOUS at 09:45

## 2024-02-29 RX ADMIN — DOCUSATE SODIUM 100 MG: 100 CAPSULE, LIQUID FILLED ORAL at 08:16

## 2024-02-29 RX ADMIN — SODIUM CHLORIDE, PRESERVATIVE FREE 10 ML: 5 INJECTION INTRAVENOUS at 08:18

## 2024-02-29 RX ADMIN — PRASUGREL 10 MG: 10 TABLET, FILM COATED ORAL at 10:09

## 2024-02-29 RX ADMIN — CARVEDILOL 3.12 MG: 3.12 TABLET, FILM COATED ORAL at 08:16

## 2024-02-29 RX ADMIN — BUSPIRONE HYDROCHLORIDE 10 MG: 10 TABLET ORAL at 08:16

## 2024-02-29 RX ADMIN — SODIUM CHLORIDE, PRESERVATIVE FREE 10 ML: 5 INJECTION INTRAVENOUS at 21:03

## 2024-02-29 RX ADMIN — INSULIN LISPRO 8 UNITS: 100 INJECTION, SOLUTION INTRAVENOUS; SUBCUTANEOUS at 09:07

## 2024-02-29 RX ADMIN — Medication 10 MG: at 21:35

## 2024-02-29 RX ADMIN — CARVEDILOL 3.12 MG: 3.12 TABLET, FILM COATED ORAL at 21:43

## 2024-02-29 RX ADMIN — SODIUM BICARBONATE 1300 MG: 648 TABLET ORAL at 08:16

## 2024-02-29 RX ADMIN — BUMETANIDE 3 MG: 1 TABLET ORAL at 08:16

## 2024-02-29 RX ADMIN — POTASSIUM CHLORIDE 10 MEQ: 10 CAPSULE, COATED, EXTENDED RELEASE ORAL at 20:55

## 2024-02-29 ASSESSMENT — PAIN DESCRIPTION - LOCATION: LOCATION: GROIN

## 2024-02-29 ASSESSMENT — PAIN SCALES - GENERAL: PAINLEVEL_OUTOF10: 8

## 2024-02-29 NOTE — CARE COORDINATION
Case Management Assessment  Initial Evaluation    Date/Time of Evaluation: 2/29/2024 5:05 PM  Assessment Completed by: Neelam Thompson RN    If patient is discharged prior to next notation, then this note serves as note for discharge by case management.    Patient Name: Estefany Garcia                   YOB: 1960  Diagnosis: Abnormal stress test [R94.39]  Status post cardiac catheterization [Z98.890]                   Date / Time: 2/28/2024  2:11 PM    Patient Admission Status: Inpatient   Readmission Risk (Low < 19, Mod (19-27), High > 27): Readmission Risk Score: 34.8    Current PCP: Zulma Payne APRN - CNP  PCP verified by CM? (P) Yes (YAYO Steward)    Chart Reviewed: Yes      History Provided by: (P) Patient  Patient Orientation: (P) Alert and Oriented, Person, Place, Situation, Self    Patient Cognition: (P) Alert    Hospitalization in the last 30 days (Readmission):  Yes    If yes, Readmission Assessment in  Navigator will be completed.    Advance Directives:      Code Status: Full Code   Patient's Primary Decision Maker is: (P) Legal Next of Kin    Primary Decision Maker: Hailey Guido - Brother/Sister - [phone]    Secondary Decision Maker: Nancy Perdomo - Brother/Sister - [phone]    Discharge Planning:    Patient lives with: (P) Alone Type of Home: (P) Other (Comment) (Condo)  Primary Care Giver: (P) Self  Patient Support Systems include: (P) Family Members   Current Financial resources: (P) Medicare, Medicaid  Current community resources: (P) Other (Comment) ([facility] [place] MWF @10:15am)  Current services prior to admission: (P) Durable Medical Equipment, Home Care            Current DME: (P) Shower Chair, Walker, Wheelchair, Glucometer, Other (Comment) (Dexcom, grab bars shower and toilet)            Type of Home Care services:  (P) Aide Services, Nursing Services, OT, PT    ADLS  Prior functional level: (P) Independent in ADLs/IADLs  Current functional

## 2024-02-29 NOTE — CARE COORDINATION
02/29/24 1705   Readmission Assessment   Number of Days since last admission? 8-30 days   Previous Disposition Home Alone   Who is being Interviewed Patient   What was the patient's/caregiver's perception as to why they think they needed to return back to the hospital? Other (Comment)  (Abn Stress, Cardiac Cath)   Did you visit your Primary Care Physician after you left the hospital, before you returned this time? No   Why weren't you able to visit your PCP? Did not have an appointment   Did you see a specialist, such as Cardiac, Pulmonary, Orthopedic Physician, etc. after you left the hospital? Yes   Who advised the patient to return to the hospital? Physician   Does the patient report anything that got in the way of taking their medications? No   In our efforts to provide the best possible care to you and others like you, can you think of anything that we could have done to help you after you left the hospital the first time, so that you might not have needed to return so soon? Other (Comment)  (Nothing)

## 2024-02-29 NOTE — CARE COORDINATION
Attempt to see patient for initial assessment. Patient off floor for dialysis. Will try again later as time allows.    8722 Attempt to see patient for initial assessment. Patient off floor for dialysis. Will try again later as time allows.

## 2024-03-01 LAB
ANION GAP SERPL CALCULATED.3IONS-SCNC: 15 MMOL/L (ref 9–17)
BUN SERPL-MCNC: 30 MG/DL (ref 8–23)
CALCIUM SERPL-MCNC: 8.4 MG/DL (ref 8.6–10.4)
CHLORIDE SERPL-SCNC: 93 MMOL/L (ref 98–107)
CO2 SERPL-SCNC: 24 MMOL/L (ref 20–31)
CREAT SERPL-MCNC: 3.6 MG/DL (ref 0.5–0.9)
GFR SERPL CREATININE-BSD FRML MDRD: 13 ML/MIN/1.73M2
GLUCOSE BLD-MCNC: 217 MG/DL (ref 65–105)
GLUCOSE BLD-MCNC: 246 MG/DL (ref 65–105)
GLUCOSE BLD-MCNC: 278 MG/DL (ref 65–105)
GLUCOSE BLD-MCNC: 316 MG/DL (ref 65–105)
GLUCOSE SERPL-MCNC: 356 MG/DL (ref 70–99)
POTASSIUM SERPL-SCNC: 4.2 MMOL/L (ref 3.7–5.3)
SODIUM SERPL-SCNC: 132 MMOL/L (ref 135–144)

## 2024-03-01 PROCEDURE — 2580000003 HC RX 258: Performed by: STUDENT IN AN ORGANIZED HEALTH CARE EDUCATION/TRAINING PROGRAM

## 2024-03-01 PROCEDURE — 36415 COLL VENOUS BLD VENIPUNCTURE: CPT

## 2024-03-01 PROCEDURE — 90935 HEMODIALYSIS ONE EVALUATION: CPT | Performed by: INTERNAL MEDICINE

## 2024-03-01 PROCEDURE — 6370000000 HC RX 637 (ALT 250 FOR IP): Performed by: NURSE PRACTITIONER

## 2024-03-01 PROCEDURE — 90935 HEMODIALYSIS ONE EVALUATION: CPT

## 2024-03-01 PROCEDURE — 99233 SBSQ HOSP IP/OBS HIGH 50: CPT | Performed by: NURSE PRACTITIONER

## 2024-03-01 PROCEDURE — 6360000002 HC RX W HCPCS: Performed by: STUDENT IN AN ORGANIZED HEALTH CARE EDUCATION/TRAINING PROGRAM

## 2024-03-01 PROCEDURE — 82947 ASSAY GLUCOSE BLOOD QUANT: CPT

## 2024-03-01 PROCEDURE — 6370000000 HC RX 637 (ALT 250 FOR IP): Performed by: FAMILY MEDICINE

## 2024-03-01 PROCEDURE — 99222 1ST HOSP IP/OBS MODERATE 55: CPT | Performed by: PHYSICAL MEDICINE & REHABILITATION

## 2024-03-01 PROCEDURE — 2060000000 HC ICU INTERMEDIATE R&B

## 2024-03-01 PROCEDURE — 6370000000 HC RX 637 (ALT 250 FOR IP): Performed by: STUDENT IN AN ORGANIZED HEALTH CARE EDUCATION/TRAINING PROGRAM

## 2024-03-01 PROCEDURE — 80048 BASIC METABOLIC PNL TOTAL CA: CPT

## 2024-03-01 RX ORDER — INSULIN GLARGINE 100 [IU]/ML
42 INJECTION, SOLUTION SUBCUTANEOUS NIGHTLY
Status: DISCONTINUED | OUTPATIENT
Start: 2024-03-01 | End: 2024-03-07 | Stop reason: HOSPADM

## 2024-03-01 RX ORDER — INSULIN LISPRO 100 [IU]/ML
20 INJECTION, SOLUTION INTRAVENOUS; SUBCUTANEOUS
Status: DISCONTINUED | OUTPATIENT
Start: 2024-03-01 | End: 2024-03-07 | Stop reason: HOSPADM

## 2024-03-01 RX ORDER — INSULIN GLARGINE 100 [IU]/ML
72 INJECTION, SOLUTION SUBCUTANEOUS DAILY
Status: DISCONTINUED | OUTPATIENT
Start: 2024-03-02 | End: 2024-03-07 | Stop reason: HOSPADM

## 2024-03-01 RX ADMIN — HEPARIN SODIUM 5000 UNITS: 5000 INJECTION INTRAVENOUS; SUBCUTANEOUS at 15:22

## 2024-03-01 RX ADMIN — BUSPIRONE HYDROCHLORIDE 10 MG: 10 TABLET ORAL at 08:08

## 2024-03-01 RX ADMIN — GABAPENTIN 300 MG: 300 CAPSULE ORAL at 21:10

## 2024-03-01 RX ADMIN — SODIUM CHLORIDE, PRESERVATIVE FREE 10 ML: 5 INJECTION INTRAVENOUS at 08:15

## 2024-03-01 RX ADMIN — SEVELAMER CARBONATE 1600 MG: 800 TABLET, FILM COATED ORAL at 08:07

## 2024-03-01 RX ADMIN — Medication 10 MG: at 21:30

## 2024-03-01 RX ADMIN — SEVELAMER CARBONATE 1600 MG: 800 TABLET, FILM COATED ORAL at 19:44

## 2024-03-01 RX ADMIN — SODIUM CHLORIDE, PRESERVATIVE FREE 10 ML: 5 INJECTION INTRAVENOUS at 21:13

## 2024-03-01 RX ADMIN — INSULIN LISPRO 4 UNITS: 100 INJECTION, SOLUTION INTRAVENOUS; SUBCUTANEOUS at 08:00

## 2024-03-01 RX ADMIN — DOCUSATE SODIUM 100 MG: 100 CAPSULE, LIQUID FILLED ORAL at 08:07

## 2024-03-01 RX ADMIN — CARVEDILOL 3.12 MG: 3.12 TABLET, FILM COATED ORAL at 08:07

## 2024-03-01 RX ADMIN — INSULIN LISPRO 8 UNITS: 100 INJECTION, SOLUTION INTRAVENOUS; SUBCUTANEOUS at 15:18

## 2024-03-01 RX ADMIN — GABAPENTIN 300 MG: 300 CAPSULE ORAL at 08:08

## 2024-03-01 RX ADMIN — PRASUGREL 10 MG: 10 TABLET, FILM COATED ORAL at 08:08

## 2024-03-01 RX ADMIN — SODIUM BICARBONATE 1300 MG: 648 TABLET ORAL at 21:10

## 2024-03-01 RX ADMIN — SODIUM BICARBONATE 1300 MG: 648 TABLET ORAL at 15:24

## 2024-03-01 RX ADMIN — SODIUM BICARBONATE 1300 MG: 648 TABLET ORAL at 08:06

## 2024-03-01 RX ADMIN — POTASSIUM CHLORIDE 10 MEQ: 10 CAPSULE, COATED, EXTENDED RELEASE ORAL at 21:10

## 2024-03-01 RX ADMIN — VENLAFAXINE HYDROCHLORIDE 75 MG: 75 CAPSULE, EXTENDED RELEASE ORAL at 08:07

## 2024-03-01 RX ADMIN — BUSPIRONE HYDROCHLORIDE 10 MG: 10 TABLET ORAL at 15:25

## 2024-03-01 RX ADMIN — ASPIRIN 81 MG: 81 TABLET, CHEWABLE ORAL at 08:09

## 2024-03-01 RX ADMIN — POTASSIUM CHLORIDE 10 MEQ: 10 CAPSULE, COATED, EXTENDED RELEASE ORAL at 08:09

## 2024-03-01 RX ADMIN — ATORVASTATIN CALCIUM 80 MG: 80 TABLET, FILM COATED ORAL at 21:11

## 2024-03-01 RX ADMIN — BUMETANIDE 3 MG: 1 TABLET ORAL at 08:07

## 2024-03-01 RX ADMIN — BUSPIRONE HYDROCHLORIDE 10 MG: 10 TABLET ORAL at 21:11

## 2024-03-01 RX ADMIN — BUMETANIDE 3 MG: 1 TABLET ORAL at 21:11

## 2024-03-01 RX ADMIN — DOCUSATE SODIUM 100 MG: 100 CAPSULE, LIQUID FILLED ORAL at 21:11

## 2024-03-01 RX ADMIN — HEPARIN SODIUM 5000 UNITS: 5000 INJECTION INTRAVENOUS; SUBCUTANEOUS at 21:12

## 2024-03-01 RX ADMIN — ALLOPURINOL 100 MG: 100 TABLET ORAL at 08:09

## 2024-03-01 RX ADMIN — HEPARIN SODIUM 5000 UNITS: 5000 INJECTION INTRAVENOUS; SUBCUTANEOUS at 06:14

## 2024-03-01 RX ADMIN — INSULIN GLARGINE 30 UNITS: 100 INJECTION, SOLUTION SUBCUTANEOUS at 08:17

## 2024-03-01 RX ADMIN — INSULIN LISPRO 4 UNITS: 100 INJECTION, SOLUTION INTRAVENOUS; SUBCUTANEOUS at 21:12

## 2024-03-01 RX ADMIN — CARVEDILOL 3.12 MG: 3.12 TABLET, FILM COATED ORAL at 21:11

## 2024-03-01 RX ADMIN — INSULIN GLARGINE 42 UNITS: 100 INJECTION, SOLUTION SUBCUTANEOUS at 21:13

## 2024-03-01 NOTE — CARE COORDINATION
Transitional planning: Plan is ARU. Await acceptance at [facility].    1405 VM from [facility] with [facility]. They are declining due to no debility beds available.    1440 Spoke to patient at bedside to notify of the above. Offered referral to the other ARUs in the area. Patient declined. States will just go home. Also not interested in SNF. Referral to [facility].    1448 PS message to KATELYN Harper NP requesting [facility] order.    1505 Spoke to patient at bedside per patient request. Patient asking about going to [facility]. Writer also discussed [facility] as being another acute inpatient rehab. After googling the facility, patient is requesting a referral to [facility]. Referral sent.    1511 Left HIPAA complaint message for Elsa with [facility] to notify of the referral.    1550 DEDE Stewart notified writer patient now wanting [facility].    1559 Phone call from Elsa with [facility]. They can accept.    1600 Notified patient of [facility] accepting. Patient concerned about getting to dialysis.     1603 Phone call to Elsa with [facility] to ask about dialysis. Informed they have in house dialysis.    1605 Notified patient of in house dialysis. Patient agrees to [facility].    1608 Phone call to Elsa patient accepting and to start precert.

## 2024-03-01 NOTE — CONSULTS
bicarbonate 650 MG tablet, Take 2 tablets by mouth twice daily (Patient taking differently: Take 2 tablets by mouth in the morning, at noon, and at bedtime)  bumetanide (BUMEX) 1 MG tablet, Take 3 tablets by mouth 2 times daily  allopurinol (ZYLOPRIM) 100 MG tablet, Take 1 tablet by mouth daily  potassium chloride (KLOR-CON) 20 MEQ packet, Take 20 mEq by mouth daily (Patient not taking: Reported on 2/21/2024)  docusate sodium (COLACE) 100 MG capsule, Take 1 capsule by mouth 2 times daily  insulin aspart (NOVOLOG FLEXPEN) 100 UNIT/ML injection pen, Inject 20 Units into the skin 3 times daily (before meals) Inject 20 units into the skin 3 times daily, before meals.  Additionally sliding scale coverage as needed 3 times a day: for blood sugar  no additional insulin.  Sugar 140-199 give 3 units.  Sugar 200-249 give 6 units.  Sugar 250-299 give 9 units.  Sugar 300-3 49 give 12 units.  Sugar 3  give 15 units.  Sugar over 400 give 18 units.  Maximum allowable amount 103 units daily  sevelamer (RENVELA) 800 MG tablet, Take 2 tablets by mouth 3 times daily (with meals)  Magnesium 400 MG CAPS, Take 1 capsule by mouth daily  Cranberry 600 MG TABS, Take 2 tablets by mouth at bedtime  insulin glargine (BASAGLAR KWIKPEN) 100 UNIT/ML injection pen, INJECT 72 UNITS SUBCUTANEOUSLY IN THE MORNING AND 42 AT BEDTIME  Continuous Blood Gluc Sensor (DEXCOM G6 SENSOR) MISC, USE TO CHECK BLOOD SUGAR FOUR TIMES DAILY . CHANGE EVERY 10 DAYS  cyclobenzaprine (FLEXERIL) 5 MG tablet, Take 1 tablet by mouth three times daily as needed for muscle spasm  lidocaine 4 % external patch, Place 1 patch onto the skin daily  venlafaxine (EFFEXOR XR) 75 MG extended release capsule, TAKE 1 CAPSULE BY MOUTH ONCE DAILY WITH BREAKFAST  atorvastatin (LIPITOR) 80 MG tablet, Take 1 tablet by mouth nightly  lidocaine-prilocaine (EMLA) 2.5-2.5 % cream, APPLY 1 HOURS PRIOR TO ACCESS AS DIRECTED  gabapentin (NEURONTIN) 300 MG capsule, Take 1 capsule by 
**OR** ondansetron (ZOFRAN) injection 4 mg, 4 mg, IntraVENous, Q6H PRN  polyethylene glycol (GLYCOLAX) packet 17 g, 17 g, Oral, Daily PRN  acetaminophen (TYLENOL) tablet 650 mg, 650 mg, Oral, Q6H PRN **OR** acetaminophen (TYLENOL) suppository 650 mg, 650 mg, Rectal, Q6H PRN  heparin (porcine) injection 5,000 Units, 5,000 Units, SubCUTAneous, 3 times per day  insulin lispro (HUMALOG) injection vial 0-16 Units, 0-16 Units, SubCUTAneous, TID WC  insulin lispro (HUMALOG) injection vial 0-4 Units, 0-4 Units, SubCUTAneous, Nightly  aspirin chewable tablet 81 mg, 81 mg, Oral, Daily  prasugrel (EFFIENT) tablet 10 mg, 10 mg, Oral, Daily  allopurinol (ZYLOPRIM) tablet 100 mg, 100 mg, Oral, Daily  atorvastatin (LIPITOR) tablet 80 mg, 80 mg, Oral, Nightly  bumetanide (BUMEX) tablet 3 mg, 3 mg, Oral, BID  busPIRone (BUSPAR) tablet 10 mg, 10 mg, Oral, TID  carvedilol (COREG) tablet 3.125 mg, 3.125 mg, Oral, BID  cyclobenzaprine (FLEXERIL) tablet 5 mg, 5 mg, Oral, TID PRN  docusate sodium (COLACE) capsule 100 mg, 100 mg, Oral, BID  gabapentin (NEURONTIN) capsule 300 mg, 300 mg, Oral, BID  lidocaine 4 % external patch 1 patch, 1 patch, TransDERmal, Daily  lidocaine (LMX) 4 % cream, , Topical, PRN  melatonin tablet 10 mg, 10 mg, Oral, Nightly PRN  nitroGLYCERIN (NITROSTAT) SL tablet 0.4 mg, 0.4 mg, SubLINGual, Q5 Min PRN  carboxymethylcellulose PF (THERATEARS/REFRESH) 1 % ophthalmic gel 1 drop, 1 drop, Both Eyes, PRN  potassium chloride (MICRO-K) extended release capsule 10 mEq, 10 mEq, Oral, BID  sevelamer (RENVELA) tablet 1,600 mg, 1,600 mg, Oral, TID WC  sodium bicarbonate tablet 1,300 mg, 1,300 mg, Oral, TID  sodium chloride (OCEAN) 0.65 % nasal spray 1 spray, 1 spray, Nasal, Q4H PRN  venlafaxine (EFFEXOR XR) extended release capsule 75 mg, 75 mg, Oral, Daily with breakfast  sodium chloride flush 0.9 % injection 5-40 mL, 5-40 mL, IntraVENous, 2 times per day  sodium chloride flush 0.9 % injection 5-40 mL, 5-40 mL, IntraVENous,

## 2024-03-01 NOTE — DISCHARGE SUMMARY
[facility]  Discharge Note                 Name:  Estefany Garcia  YOB: 1960  Social Security Number:  [id number]  Medical Record Number:  [id number]    Date of Admission:  2/28/2024  Date of Discharge:  3/1/2024    Admitting physician: Jairo Hurley MD    Discharge Attending: AFSANEH HILTON CNP CNP  Primary Care Physician: Zulma Payne APRN - CNP  Consultants: Cardiology  Discharge to Home  Stable Condition   HOSPITAL ADMISSION PROBLEM LIST:  Patient Active Problem List   Diagnosis    Atherosclerosis of coronary artery bypass graft of native heart with angina pectoris ([facility])    Acute kidney injury superimposed on CKD ([facility])    Acute on chronic diastolic heart failure ([facility])    Diabetic polyneuropathy associated with type 2 diabetes mellitus ([facility])    History of coronary artery bypass graft    Iron deficiency anemia    Spinal stenosis of lumbar region with neurogenic claudication    Mixed hyperlipidemia    CKD (chronic kidney disease) stage 4, GFR 15-29 ml/min ([facility])    Type 2 diabetes 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, with long-term current use of insulin ([facility])    Obesity, Class II, BMI 35-39.9    Thyroid nodule greater than or equal to 1 cm in diameter incidentally noted on imaging study    Essential hypertension    Anemia of chronic disease    Chronic ischemic heart disease    Ischemic stroke of frontal lobe ([facility])    Morbid obesity with BMI of 40.0-44.9, adult ([facility])    Disequilibrium syndrome    Anxiety    Chronic midline low back pain with bilateral sciatica    Acute thoracic back pain    COVID    Delirium due to another medical condition    Cerebral hypoperfusion    Immature arteriovenous fistula ([facility])    History of fusion of cervical spine    Depression with anxiety    Vancomycin resistant Enterococcus UTI    ESRD on hemodialysis ([facility])    Dialysis disequilibrium syndrome    Acute cystitis without hematuria    VRE infection (vancomycin

## 2024-03-01 NOTE — DISCHARGE INSTR - COC
Continuity of Care Form    Patient Name: Estefany Garcia   :  1960  MRN:  [id number]    Admit date:  2024  Discharge date:  3/7/2024    Code Status Order: Full Code   Advance Directives:     Admitting Physician:  Jairo Ceja MD  PCP: Zulma Payne, APRN - CNP    Discharging Nurse: YARELY  Discharging Hospital Unit/Room#: 0541/0541-01  Discharging Unit Phone Number: [phone]    Emergency Contact:   Extended Emergency Contact Information  Primary Emergency Contact: Hailey Guido  Home Phone: [phone]  Relation: Brother/Sister  Secondary Emergency Contact: Nancy Perdomo  Home Phone: [phone]  Relation: Brother/Sister    Past Surgical History:  Past Surgical History:   Procedure Laterality Date    ANKLE FRACTURE SURGERY      AV FISTULA CREATION  2021    BACK SURGERY      BREAST SURGERY      CARDIAC CATHETERIZATION      MVD  /  CT CONSULT    CARDIAC PROCEDURE N/A 2023    ron / Coronary angiography / op scmh performed by Jairo Ceja MD at [facility] CARDIAC CATH LAB    CARDIAC PROCEDURE N/A 11/15/2023    ron / Percutaneous coronary intervention performed by Jairo Ceja MD at [facility] CARDIAC CATH LAB    CARDIAC PROCEDURE N/A 2024    ron / Left heart cath / op scmh performed by Jairo Ceja MD at [facility] CARDIAC CATH LAB    CARDIAC PROCEDURE N/A 2024    Percutaneous coronary intervention performed by Jairo Ceja MD at [facility] CARDIAC CATH LAB    CARDIAC SURGERY      CARPAL TUNNEL RELEASE      CHOLECYSTECTOMY, OPEN N/A     COLONOSCOPY      CORONARY ANGIOPLASTY WITH STENT PLACEMENT  2023    PTCA / FADIA RCA    CORONARY ANGIOPLASTY WITH STENT PLACEMENT  2019    STENT X1 AT [facility]    CORONARY ANGIOPLASTY WITH STENT PLACEMENT  2023    DR CEJA  /  FADIA SVG-DIAG  /  NEEDS RCA DONE    CORONARY ARTERY BYPASS GRAFT  02/2005    X3 [facility]    DIALYSIS CATHETER INSERTION Right 2023    CVC CATHETER REPLACEMENT right chest performed by Bib Ross

## 2024-03-02 LAB
EKG ATRIAL RATE: 56 BPM
EKG P AXIS: 57 DEGREES
EKG P-R INTERVAL: 176 MS
EKG Q-T INTERVAL: 488 MS
EKG QRS DURATION: 100 MS
EKG QTC CALCULATION (BAZETT): 470 MS
EKG R AXIS: 33 DEGREES
EKG T AXIS: 12 DEGREES
EKG VENTRICULAR RATE: 56 BPM
GLUCOSE BLD-MCNC: 123 MG/DL (ref 65–105)
GLUCOSE BLD-MCNC: 299 MG/DL (ref 65–105)
GLUCOSE BLD-MCNC: 369 MG/DL (ref 65–105)

## 2024-03-02 PROCEDURE — 6360000002 HC RX W HCPCS: Performed by: STUDENT IN AN ORGANIZED HEALTH CARE EDUCATION/TRAINING PROGRAM

## 2024-03-02 PROCEDURE — 99232 SBSQ HOSP IP/OBS MODERATE 35: CPT | Performed by: INTERNAL MEDICINE

## 2024-03-02 PROCEDURE — 2060000000 HC ICU INTERMEDIATE R&B

## 2024-03-02 PROCEDURE — 6370000000 HC RX 637 (ALT 250 FOR IP): Performed by: NURSE PRACTITIONER

## 2024-03-02 PROCEDURE — 6370000000 HC RX 637 (ALT 250 FOR IP): Performed by: STUDENT IN AN ORGANIZED HEALTH CARE EDUCATION/TRAINING PROGRAM

## 2024-03-02 PROCEDURE — 99239 HOSP IP/OBS DSCHRG MGMT >30: CPT | Performed by: NURSE PRACTITIONER

## 2024-03-02 PROCEDURE — 2580000003 HC RX 258: Performed by: STUDENT IN AN ORGANIZED HEALTH CARE EDUCATION/TRAINING PROGRAM

## 2024-03-02 PROCEDURE — 6370000000 HC RX 637 (ALT 250 FOR IP): Performed by: FAMILY MEDICINE

## 2024-03-02 PROCEDURE — 82947 ASSAY GLUCOSE BLOOD QUANT: CPT

## 2024-03-02 PROCEDURE — 93010 ELECTROCARDIOGRAM REPORT: CPT | Performed by: INTERNAL MEDICINE

## 2024-03-02 RX ADMIN — POTASSIUM CHLORIDE 10 MEQ: 10 CAPSULE, COATED, EXTENDED RELEASE ORAL at 21:16

## 2024-03-02 RX ADMIN — VENLAFAXINE HYDROCHLORIDE 75 MG: 75 CAPSULE, EXTENDED RELEASE ORAL at 08:55

## 2024-03-02 RX ADMIN — GABAPENTIN 300 MG: 300 CAPSULE ORAL at 21:17

## 2024-03-02 RX ADMIN — SODIUM BICARBONATE 1300 MG: 648 TABLET ORAL at 21:17

## 2024-03-02 RX ADMIN — BUSPIRONE HYDROCHLORIDE 10 MG: 10 TABLET ORAL at 21:19

## 2024-03-02 RX ADMIN — SODIUM CHLORIDE, PRESERVATIVE FREE 10 ML: 5 INJECTION INTRAVENOUS at 21:20

## 2024-03-02 RX ADMIN — BUMETANIDE 3 MG: 1 TABLET ORAL at 21:17

## 2024-03-02 RX ADMIN — INSULIN LISPRO 20 UNITS: 100 INJECTION, SOLUTION INTRAVENOUS; SUBCUTANEOUS at 09:35

## 2024-03-02 RX ADMIN — SEVELAMER CARBONATE 1600 MG: 800 TABLET, FILM COATED ORAL at 12:49

## 2024-03-02 RX ADMIN — PRASUGREL 10 MG: 10 TABLET, FILM COATED ORAL at 08:55

## 2024-03-02 RX ADMIN — INSULIN LISPRO 16 UNITS: 100 INJECTION, SOLUTION INTRAVENOUS; SUBCUTANEOUS at 12:47

## 2024-03-02 RX ADMIN — SEVELAMER CARBONATE 1600 MG: 800 TABLET, FILM COATED ORAL at 16:32

## 2024-03-02 RX ADMIN — SODIUM CHLORIDE, PRESERVATIVE FREE 10 ML: 5 INJECTION INTRAVENOUS at 09:00

## 2024-03-02 RX ADMIN — ASPIRIN 81 MG: 81 TABLET, CHEWABLE ORAL at 08:54

## 2024-03-02 RX ADMIN — CARVEDILOL 3.12 MG: 3.12 TABLET, FILM COATED ORAL at 08:54

## 2024-03-02 RX ADMIN — BUSPIRONE HYDROCHLORIDE 10 MG: 10 TABLET ORAL at 16:31

## 2024-03-02 RX ADMIN — INSULIN LISPRO 20 UNITS: 100 INJECTION, SOLUTION INTRAVENOUS; SUBCUTANEOUS at 18:26

## 2024-03-02 RX ADMIN — SODIUM BICARBONATE 1300 MG: 648 TABLET ORAL at 08:55

## 2024-03-02 RX ADMIN — ATORVASTATIN CALCIUM 80 MG: 80 TABLET, FILM COATED ORAL at 21:19

## 2024-03-02 RX ADMIN — INSULIN GLARGINE 42 UNITS: 100 INJECTION, SOLUTION SUBCUTANEOUS at 21:19

## 2024-03-02 RX ADMIN — DOCUSATE SODIUM 100 MG: 100 CAPSULE, LIQUID FILLED ORAL at 21:17

## 2024-03-02 RX ADMIN — ALLOPURINOL 100 MG: 100 TABLET ORAL at 08:54

## 2024-03-02 RX ADMIN — CARVEDILOL 3.12 MG: 3.12 TABLET, FILM COATED ORAL at 21:19

## 2024-03-02 RX ADMIN — INSULIN LISPRO 20 UNITS: 100 INJECTION, SOLUTION INTRAVENOUS; SUBCUTANEOUS at 12:48

## 2024-03-02 RX ADMIN — SODIUM CHLORIDE, PRESERVATIVE FREE 10 ML: 5 INJECTION INTRAVENOUS at 08:59

## 2024-03-02 RX ADMIN — HEPARIN SODIUM 5000 UNITS: 5000 INJECTION INTRAVENOUS; SUBCUTANEOUS at 21:19

## 2024-03-02 RX ADMIN — BUSPIRONE HYDROCHLORIDE 10 MG: 10 TABLET ORAL at 08:54

## 2024-03-02 RX ADMIN — GABAPENTIN 300 MG: 300 CAPSULE ORAL at 08:54

## 2024-03-02 RX ADMIN — HEPARIN SODIUM 5000 UNITS: 5000 INJECTION INTRAVENOUS; SUBCUTANEOUS at 06:31

## 2024-03-02 RX ADMIN — INSULIN GLARGINE 72 UNITS: 100 INJECTION, SOLUTION SUBCUTANEOUS at 09:09

## 2024-03-02 RX ADMIN — HEPARIN SODIUM 5000 UNITS: 5000 INJECTION INTRAVENOUS; SUBCUTANEOUS at 16:30

## 2024-03-02 RX ADMIN — SODIUM BICARBONATE 1300 MG: 648 TABLET ORAL at 16:33

## 2024-03-02 RX ADMIN — INSULIN LISPRO 8 UNITS: 100 INJECTION, SOLUTION INTRAVENOUS; SUBCUTANEOUS at 09:07

## 2024-03-02 RX ADMIN — BUMETANIDE 3 MG: 1 TABLET ORAL at 08:54

## 2024-03-02 RX ADMIN — POTASSIUM CHLORIDE 10 MEQ: 10 CAPSULE, COATED, EXTENDED RELEASE ORAL at 08:54

## 2024-03-02 RX ADMIN — Medication 10 MG: at 21:19

## 2024-03-02 RX ADMIN — DOCUSATE SODIUM 100 MG: 100 CAPSULE, LIQUID FILLED ORAL at 08:54

## 2024-03-02 RX ADMIN — SEVELAMER CARBONATE 1600 MG: 800 TABLET, FILM COATED ORAL at 08:55

## 2024-03-02 NOTE — DISCHARGE SUMMARY
[facility]  Discharge Note                 Name:  Estefany Garcia  YOB: 1960  Social Security Number:  [id number]  Medical Record Number:  [id number]    Date of Admission:  2/28/2024  Date of Discharge:  3/2/2024    Admitting physician: Jaior Hurley MD    Discharge Attending: AFSANEH Aguilera CNP, CNP  Primary Care Physician: Zulma Payne APRN - CNP  Consultants: Cardiology  Discharge to Home  Stable Condition   HOSPITAL ADMISSION PROBLEM LIST:  Patient Active Problem List   Diagnosis    Atherosclerosis of coronary artery bypass graft of native heart with angina pectoris ([facility])    Acute kidney injury superimposed on CKD ([facility])    Acute on chronic diastolic heart failure ([facility])    Diabetic polyneuropathy associated with type 2 diabetes mellitus ([facility])    History of coronary artery bypass graft    Iron deficiency anemia    Spinal stenosis of lumbar region with neurogenic claudication    Mixed hyperlipidemia    CKD (chronic kidney disease) stage 4, GFR 15-29 ml/min ([facility])    Type 2 diabetes 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, with long-term current use of insulin ([facility])    Obesity, Class II, BMI 35-39.9    Thyroid nodule greater than or equal to 1 cm in diameter incidentally noted on imaging study    Essential hypertension    Anemia of chronic disease    Chronic ischemic heart disease    Ischemic stroke of frontal lobe ([facility])    Morbid obesity with BMI of 40.0-44.9, adult ([facility])    Disequilibrium syndrome    Anxiety    Chronic midline low back pain with bilateral sciatica    Acute thoracic back pain    COVID    Delirium due to another medical condition    Cerebral hypoperfusion    Immature arteriovenous fistula ([facility])    History of fusion of cervical spine    Depression with anxiety    Vancomycin resistant Enterococcus UTI    ESRD on hemodialysis ([facility])    Dialysis disequilibrium syndrome    Acute cystitis without hematuria    VRE infection (vancomycin

## 2024-03-03 LAB
GLUCOSE BLD-MCNC: 161 MG/DL (ref 65–105)
GLUCOSE BLD-MCNC: 186 MG/DL (ref 65–105)
GLUCOSE BLD-MCNC: 188 MG/DL (ref 65–105)
GLUCOSE BLD-MCNC: 205 MG/DL (ref 65–105)
GLUCOSE BLD-MCNC: 236 MG/DL (ref 65–105)
GLUCOSE BLD-MCNC: 250 MG/DL (ref 65–105)

## 2024-03-03 PROCEDURE — 6360000002 HC RX W HCPCS: Performed by: STUDENT IN AN ORGANIZED HEALTH CARE EDUCATION/TRAINING PROGRAM

## 2024-03-03 PROCEDURE — 6370000000 HC RX 637 (ALT 250 FOR IP): Performed by: STUDENT IN AN ORGANIZED HEALTH CARE EDUCATION/TRAINING PROGRAM

## 2024-03-03 PROCEDURE — 99231 SBSQ HOSP IP/OBS SF/LOW 25: CPT | Performed by: INTERNAL MEDICINE

## 2024-03-03 PROCEDURE — 97110 THERAPEUTIC EXERCISES: CPT

## 2024-03-03 PROCEDURE — 82947 ASSAY GLUCOSE BLOOD QUANT: CPT

## 2024-03-03 PROCEDURE — 6370000000 HC RX 637 (ALT 250 FOR IP): Performed by: NURSE PRACTITIONER

## 2024-03-03 PROCEDURE — 99233 SBSQ HOSP IP/OBS HIGH 50: CPT | Performed by: NURSE PRACTITIONER

## 2024-03-03 PROCEDURE — 97116 GAIT TRAINING THERAPY: CPT

## 2024-03-03 PROCEDURE — 6370000000 HC RX 637 (ALT 250 FOR IP): Performed by: FAMILY MEDICINE

## 2024-03-03 PROCEDURE — 2580000003 HC RX 258: Performed by: STUDENT IN AN ORGANIZED HEALTH CARE EDUCATION/TRAINING PROGRAM

## 2024-03-03 PROCEDURE — 2060000000 HC ICU INTERMEDIATE R&B

## 2024-03-03 RX ADMIN — SEVELAMER CARBONATE 1600 MG: 800 TABLET, FILM COATED ORAL at 12:46

## 2024-03-03 RX ADMIN — SODIUM CHLORIDE, PRESERVATIVE FREE 10 ML: 5 INJECTION INTRAVENOUS at 09:05

## 2024-03-03 RX ADMIN — INSULIN LISPRO 20 UNITS: 100 INJECTION, SOLUTION INTRAVENOUS; SUBCUTANEOUS at 13:21

## 2024-03-03 RX ADMIN — BUSPIRONE HYDROCHLORIDE 10 MG: 10 TABLET ORAL at 08:46

## 2024-03-03 RX ADMIN — BUSPIRONE HYDROCHLORIDE 10 MG: 10 TABLET ORAL at 20:42

## 2024-03-03 RX ADMIN — SODIUM BICARBONATE 1300 MG: 648 TABLET ORAL at 15:40

## 2024-03-03 RX ADMIN — ALLOPURINOL 100 MG: 100 TABLET ORAL at 08:46

## 2024-03-03 RX ADMIN — SODIUM BICARBONATE 1300 MG: 648 TABLET ORAL at 08:46

## 2024-03-03 RX ADMIN — BUMETANIDE 3 MG: 1 TABLET ORAL at 08:46

## 2024-03-03 RX ADMIN — BUSPIRONE HYDROCHLORIDE 10 MG: 10 TABLET ORAL at 15:40

## 2024-03-03 RX ADMIN — VENLAFAXINE HYDROCHLORIDE 75 MG: 75 CAPSULE, EXTENDED RELEASE ORAL at 08:46

## 2024-03-03 RX ADMIN — SEVELAMER CARBONATE 1600 MG: 800 TABLET, FILM COATED ORAL at 08:46

## 2024-03-03 RX ADMIN — SODIUM CHLORIDE, PRESERVATIVE FREE 10 ML: 5 INJECTION INTRAVENOUS at 20:43

## 2024-03-03 RX ADMIN — INSULIN GLARGINE 42 UNITS: 100 INJECTION, SOLUTION SUBCUTANEOUS at 20:42

## 2024-03-03 RX ADMIN — POTASSIUM CHLORIDE 10 MEQ: 10 CAPSULE, COATED, EXTENDED RELEASE ORAL at 20:42

## 2024-03-03 RX ADMIN — Medication 10 MG: at 21:22

## 2024-03-03 RX ADMIN — CARVEDILOL 3.12 MG: 3.12 TABLET, FILM COATED ORAL at 08:46

## 2024-03-03 RX ADMIN — POTASSIUM CHLORIDE 10 MEQ: 10 CAPSULE, COATED, EXTENDED RELEASE ORAL at 08:46

## 2024-03-03 RX ADMIN — INSULIN LISPRO 4 UNITS: 100 INJECTION, SOLUTION INTRAVENOUS; SUBCUTANEOUS at 08:57

## 2024-03-03 RX ADMIN — INSULIN LISPRO 20 UNITS: 100 INJECTION, SOLUTION INTRAVENOUS; SUBCUTANEOUS at 08:57

## 2024-03-03 RX ADMIN — HEPARIN SODIUM 5000 UNITS: 5000 INJECTION INTRAVENOUS; SUBCUTANEOUS at 21:22

## 2024-03-03 RX ADMIN — CARVEDILOL 3.12 MG: 3.12 TABLET, FILM COATED ORAL at 20:42

## 2024-03-03 RX ADMIN — SODIUM BICARBONATE 1300 MG: 648 TABLET ORAL at 20:42

## 2024-03-03 RX ADMIN — ATORVASTATIN CALCIUM 80 MG: 80 TABLET, FILM COATED ORAL at 20:42

## 2024-03-03 RX ADMIN — ASPIRIN 81 MG: 81 TABLET, CHEWABLE ORAL at 08:46

## 2024-03-03 RX ADMIN — INSULIN LISPRO 20 UNITS: 100 INJECTION, SOLUTION INTRAVENOUS; SUBCUTANEOUS at 17:56

## 2024-03-03 RX ADMIN — GABAPENTIN 300 MG: 300 CAPSULE ORAL at 08:46

## 2024-03-03 RX ADMIN — HEPARIN SODIUM 5000 UNITS: 5000 INJECTION INTRAVENOUS; SUBCUTANEOUS at 05:15

## 2024-03-03 RX ADMIN — PRASUGREL 10 MG: 10 TABLET, FILM COATED ORAL at 08:46

## 2024-03-03 RX ADMIN — GABAPENTIN 300 MG: 300 CAPSULE ORAL at 20:42

## 2024-03-03 RX ADMIN — BUMETANIDE 3 MG: 1 TABLET ORAL at 20:42

## 2024-03-03 RX ADMIN — HEPARIN SODIUM 5000 UNITS: 5000 INJECTION INTRAVENOUS; SUBCUTANEOUS at 15:40

## 2024-03-03 RX ADMIN — SEVELAMER CARBONATE 1600 MG: 800 TABLET, FILM COATED ORAL at 17:24

## 2024-03-03 RX ADMIN — DOCUSATE SODIUM 100 MG: 100 CAPSULE, LIQUID FILLED ORAL at 08:46

## 2024-03-03 RX ADMIN — INSULIN LISPRO 4 UNITS: 100 INJECTION, SOLUTION INTRAVENOUS; SUBCUTANEOUS at 12:46

## 2024-03-03 RX ADMIN — INSULIN GLARGINE 72 UNITS: 100 INJECTION, SOLUTION SUBCUTANEOUS at 08:47

## 2024-03-03 RX ADMIN — DOCUSATE SODIUM 100 MG: 100 CAPSULE, LIQUID FILLED ORAL at 20:42

## 2024-03-04 ENCOUNTER — APPOINTMENT (OUTPATIENT)
Dept: DIALYSIS | Age: 64
DRG: 250 | End: 2024-03-04
Attending: INTERNAL MEDICINE
Payer: COMMERCIAL

## 2024-03-04 LAB
ANION GAP SERPL CALCULATED.3IONS-SCNC: 15 MMOL/L (ref 9–17)
BUN SERPL-MCNC: 52 MG/DL (ref 8–23)
CALCIUM SERPL-MCNC: 9.3 MG/DL (ref 8.6–10.4)
CHLORIDE SERPL-SCNC: 94 MMOL/L (ref 98–107)
CO2 SERPL-SCNC: 24 MMOL/L (ref 20–31)
CREAT SERPL-MCNC: 5.2 MG/DL (ref 0.5–0.9)
GFR SERPL CREATININE-BSD FRML MDRD: 9 ML/MIN/1.73M2
GLUCOSE BLD-MCNC: 171 MG/DL (ref 65–105)
GLUCOSE BLD-MCNC: 186 MG/DL (ref 65–105)
GLUCOSE BLD-MCNC: 187 MG/DL (ref 65–105)
GLUCOSE BLD-MCNC: 232 MG/DL (ref 65–105)
GLUCOSE SERPL-MCNC: 164 MG/DL (ref 70–99)
POTASSIUM SERPL-SCNC: 4.5 MMOL/L (ref 3.7–5.3)
SODIUM SERPL-SCNC: 133 MMOL/L (ref 135–144)

## 2024-03-04 PROCEDURE — 90935 HEMODIALYSIS ONE EVALUATION: CPT

## 2024-03-04 PROCEDURE — 6360000002 HC RX W HCPCS: Performed by: STUDENT IN AN ORGANIZED HEALTH CARE EDUCATION/TRAINING PROGRAM

## 2024-03-04 PROCEDURE — 2580000003 HC RX 258: Performed by: STUDENT IN AN ORGANIZED HEALTH CARE EDUCATION/TRAINING PROGRAM

## 2024-03-04 PROCEDURE — 2060000000 HC ICU INTERMEDIATE R&B

## 2024-03-04 PROCEDURE — 82947 ASSAY GLUCOSE BLOOD QUANT: CPT

## 2024-03-04 PROCEDURE — 80048 BASIC METABOLIC PNL TOTAL CA: CPT

## 2024-03-04 PROCEDURE — 6370000000 HC RX 637 (ALT 250 FOR IP): Performed by: NURSE PRACTITIONER

## 2024-03-04 PROCEDURE — 97535 SELF CARE MNGMENT TRAINING: CPT

## 2024-03-04 PROCEDURE — 6370000000 HC RX 637 (ALT 250 FOR IP): Performed by: FAMILY MEDICINE

## 2024-03-04 PROCEDURE — 6370000000 HC RX 637 (ALT 250 FOR IP): Performed by: STUDENT IN AN ORGANIZED HEALTH CARE EDUCATION/TRAINING PROGRAM

## 2024-03-04 PROCEDURE — 36415 COLL VENOUS BLD VENIPUNCTURE: CPT

## 2024-03-04 PROCEDURE — 90935 HEMODIALYSIS ONE EVALUATION: CPT | Performed by: INTERNAL MEDICINE

## 2024-03-04 RX ADMIN — INSULIN GLARGINE 72 UNITS: 100 INJECTION, SOLUTION SUBCUTANEOUS at 09:28

## 2024-03-04 RX ADMIN — ATORVASTATIN CALCIUM 80 MG: 80 TABLET, FILM COATED ORAL at 21:16

## 2024-03-04 RX ADMIN — SODIUM BICARBONATE 1300 MG: 648 TABLET ORAL at 08:30

## 2024-03-04 RX ADMIN — CARVEDILOL 3.12 MG: 3.12 TABLET, FILM COATED ORAL at 21:15

## 2024-03-04 RX ADMIN — SODIUM CHLORIDE, PRESERVATIVE FREE 10 ML: 5 INJECTION INTRAVENOUS at 08:32

## 2024-03-04 RX ADMIN — CARVEDILOL 3.12 MG: 3.12 TABLET, FILM COATED ORAL at 08:31

## 2024-03-04 RX ADMIN — INSULIN GLARGINE 42 UNITS: 100 INJECTION, SOLUTION SUBCUTANEOUS at 21:33

## 2024-03-04 RX ADMIN — POTASSIUM CHLORIDE 10 MEQ: 10 CAPSULE, COATED, EXTENDED RELEASE ORAL at 08:31

## 2024-03-04 RX ADMIN — POTASSIUM CHLORIDE 10 MEQ: 10 CAPSULE, COATED, EXTENDED RELEASE ORAL at 21:16

## 2024-03-04 RX ADMIN — GABAPENTIN 300 MG: 300 CAPSULE ORAL at 08:31

## 2024-03-04 RX ADMIN — BUMETANIDE 3 MG: 1 TABLET ORAL at 08:31

## 2024-03-04 RX ADMIN — HEPARIN SODIUM 5000 UNITS: 5000 INJECTION INTRAVENOUS; SUBCUTANEOUS at 16:02

## 2024-03-04 RX ADMIN — DOCUSATE SODIUM 100 MG: 100 CAPSULE, LIQUID FILLED ORAL at 08:31

## 2024-03-04 RX ADMIN — SEVELAMER CARBONATE 1600 MG: 800 TABLET, FILM COATED ORAL at 08:30

## 2024-03-04 RX ADMIN — DOCUSATE SODIUM 100 MG: 100 CAPSULE, LIQUID FILLED ORAL at 21:16

## 2024-03-04 RX ADMIN — SODIUM CHLORIDE, PRESERVATIVE FREE 10 ML: 5 INJECTION INTRAVENOUS at 20:56

## 2024-03-04 RX ADMIN — HEPARIN SODIUM 5000 UNITS: 5000 INJECTION INTRAVENOUS; SUBCUTANEOUS at 05:37

## 2024-03-04 RX ADMIN — INSULIN LISPRO 20 UNITS: 100 INJECTION, SOLUTION INTRAVENOUS; SUBCUTANEOUS at 18:00

## 2024-03-04 RX ADMIN — INSULIN LISPRO 20 UNITS: 100 INJECTION, SOLUTION INTRAVENOUS; SUBCUTANEOUS at 09:28

## 2024-03-04 RX ADMIN — Medication 10 MG: at 21:14

## 2024-03-04 RX ADMIN — PRASUGREL 10 MG: 10 TABLET, FILM COATED ORAL at 08:31

## 2024-03-04 RX ADMIN — ACETAMINOPHEN 650 MG: 325 TABLET ORAL at 21:14

## 2024-03-04 RX ADMIN — VENLAFAXINE HYDROCHLORIDE 75 MG: 75 CAPSULE, EXTENDED RELEASE ORAL at 08:30

## 2024-03-04 RX ADMIN — BUSPIRONE HYDROCHLORIDE 10 MG: 10 TABLET ORAL at 08:31

## 2024-03-04 RX ADMIN — BUSPIRONE HYDROCHLORIDE 10 MG: 10 TABLET ORAL at 21:15

## 2024-03-04 RX ADMIN — SODIUM BICARBONATE 1300 MG: 648 TABLET ORAL at 21:15

## 2024-03-04 RX ADMIN — BUMETANIDE 3 MG: 1 TABLET ORAL at 21:16

## 2024-03-04 RX ADMIN — BUSPIRONE HYDROCHLORIDE 10 MG: 10 TABLET ORAL at 16:02

## 2024-03-04 RX ADMIN — HEPARIN SODIUM 5000 UNITS: 5000 INJECTION INTRAVENOUS; SUBCUTANEOUS at 21:15

## 2024-03-04 RX ADMIN — SEVELAMER CARBONATE 1600 MG: 800 TABLET, FILM COATED ORAL at 16:02

## 2024-03-04 RX ADMIN — ACETAMINOPHEN 650 MG: 325 TABLET ORAL at 08:31

## 2024-03-04 RX ADMIN — ASPIRIN 81 MG: 81 TABLET, CHEWABLE ORAL at 09:29

## 2024-03-04 RX ADMIN — GABAPENTIN 300 MG: 300 CAPSULE ORAL at 21:16

## 2024-03-04 RX ADMIN — SODIUM BICARBONATE 1300 MG: 648 TABLET ORAL at 16:02

## 2024-03-04 RX ADMIN — ALLOPURINOL 100 MG: 100 TABLET ORAL at 08:31

## 2024-03-04 ASSESSMENT — PAIN DESCRIPTION - LOCATION
LOCATION: HIP
LOCATION: HAND;FOOT
LOCATION: HIP
LOCATION: HIP

## 2024-03-04 ASSESSMENT — PAIN SCALES - GENERAL
PAINLEVEL_OUTOF10: 5
PAINLEVEL_OUTOF10: 3
PAINLEVEL_OUTOF10: 0
PAINLEVEL_OUTOF10: 5
PAINLEVEL_OUTOF10: 5

## 2024-03-04 ASSESSMENT — PAIN DESCRIPTION - FREQUENCY
FREQUENCY: INTERMITTENT
FREQUENCY: INTERMITTENT

## 2024-03-04 ASSESSMENT — PAIN DESCRIPTION - ONSET
ONSET: ON-GOING
ONSET: ON-GOING

## 2024-03-04 ASSESSMENT — PAIN DESCRIPTION - ORIENTATION
ORIENTATION: LEFT;RIGHT
ORIENTATION: RIGHT
ORIENTATION: RIGHT;LEFT

## 2024-03-04 ASSESSMENT — PAIN DESCRIPTION - DESCRIPTORS
DESCRIPTORS: ACHING;DISCOMFORT
DESCRIPTORS: ACHING
DESCRIPTORS: ACHING

## 2024-03-04 ASSESSMENT — PAIN DESCRIPTION - PAIN TYPE
TYPE: CHRONIC PAIN
TYPE: CHRONIC PAIN

## 2024-03-04 NOTE — PROGRESS NOTES
Physician Progress Note      PATIENT:               SANDRA LAUREANO  CSN #:                  [id number]  :                       1960  ADMIT DATE:       2024 10:10 PM  DISCH DATE:        2024 1:41 PM  RESPONDING  PROVIDER #:        Madonna Aviles MD          QUERY TEXT:    Pt admitted with atypical chest pain.  Pt noted to have abnormal stress test   and a history of CAD. If possible, please document in progress notes and   discharge summary if you are evaluating and/or treating any of the following:    The medical record reflects the following:  Risk Factors: atypical CP  Clinical Indicators: presented with atypical CP, stress test was + for   ischemia and needed a heart catheterization; troponin trend 120, 123, 131,   119; EKG changes  Treatment: Labs, imaging, monitoring, Cardiology consult, transfer for heart   catheterization  Options provided:  -- Chest pain due to CAD with unstable angina  -- Chest pain due to NSTEMI  -- Chest pain due to GERD  -- Chest pain due to costochondritis  -- Chest pain due to other cause, Please document cause.  -- Other - I will add my own diagnosis  -- Disagree - Not applicable / Not valid  -- Disagree - Clinically unable to determine / Unknown  -- Refer to Clinical Documentation Reviewer    PROVIDER RESPONSE TEXT:    Provider is clinically unable to determine a response to this query.  I do not know what the chest pain was caused by - she was transferred to [facility] for cardiac catheterization and I have not seen the report nor heard   from the cardiologist what was found.    Query created by: Caro Herron on 3/1/2024 12:29 PM      Electronically signed by:  Madonna Aviles MD 3/4/2024 7:33 AM

## 2024-03-04 NOTE — CARE COORDINATION
Transitional planning: Phone call to Elsa to inquire about status of precert. Remains pending at this time.

## 2024-03-05 LAB
GLUCOSE BLD-MCNC: 151 MG/DL (ref 65–105)
GLUCOSE BLD-MCNC: 180 MG/DL (ref 65–105)
GLUCOSE BLD-MCNC: 185 MG/DL (ref 65–105)
GLUCOSE BLD-MCNC: 275 MG/DL (ref 65–105)

## 2024-03-05 PROCEDURE — 6370000000 HC RX 637 (ALT 250 FOR IP): Performed by: FAMILY MEDICINE

## 2024-03-05 PROCEDURE — 99233 SBSQ HOSP IP/OBS HIGH 50: CPT

## 2024-03-05 PROCEDURE — 6360000002 HC RX W HCPCS: Performed by: STUDENT IN AN ORGANIZED HEALTH CARE EDUCATION/TRAINING PROGRAM

## 2024-03-05 PROCEDURE — 2580000003 HC RX 258: Performed by: STUDENT IN AN ORGANIZED HEALTH CARE EDUCATION/TRAINING PROGRAM

## 2024-03-05 PROCEDURE — 97116 GAIT TRAINING THERAPY: CPT

## 2024-03-05 PROCEDURE — 99232 SBSQ HOSP IP/OBS MODERATE 35: CPT | Performed by: INTERNAL MEDICINE

## 2024-03-05 PROCEDURE — 82947 ASSAY GLUCOSE BLOOD QUANT: CPT

## 2024-03-05 PROCEDURE — 2060000000 HC ICU INTERMEDIATE R&B

## 2024-03-05 PROCEDURE — 6370000000 HC RX 637 (ALT 250 FOR IP): Performed by: NURSE PRACTITIONER

## 2024-03-05 PROCEDURE — 97110 THERAPEUTIC EXERCISES: CPT

## 2024-03-05 PROCEDURE — 97535 SELF CARE MNGMENT TRAINING: CPT

## 2024-03-05 PROCEDURE — 6370000000 HC RX 637 (ALT 250 FOR IP): Performed by: STUDENT IN AN ORGANIZED HEALTH CARE EDUCATION/TRAINING PROGRAM

## 2024-03-05 RX ADMIN — BUMETANIDE 3 MG: 1 TABLET ORAL at 08:42

## 2024-03-05 RX ADMIN — BUMETANIDE 3 MG: 1 TABLET ORAL at 20:34

## 2024-03-05 RX ADMIN — DOCUSATE SODIUM 100 MG: 100 CAPSULE, LIQUID FILLED ORAL at 08:42

## 2024-03-05 RX ADMIN — POTASSIUM CHLORIDE 10 MEQ: 10 CAPSULE, COATED, EXTENDED RELEASE ORAL at 08:42

## 2024-03-05 RX ADMIN — INSULIN LISPRO 20 UNITS: 100 INJECTION, SOLUTION INTRAVENOUS; SUBCUTANEOUS at 12:00

## 2024-03-05 RX ADMIN — ASPIRIN 81 MG: 81 TABLET, CHEWABLE ORAL at 08:42

## 2024-03-05 RX ADMIN — VENLAFAXINE HYDROCHLORIDE 75 MG: 75 CAPSULE, EXTENDED RELEASE ORAL at 08:42

## 2024-03-05 RX ADMIN — BUSPIRONE HYDROCHLORIDE 10 MG: 10 TABLET ORAL at 15:37

## 2024-03-05 RX ADMIN — INSULIN GLARGINE 72 UNITS: 100 INJECTION, SOLUTION SUBCUTANEOUS at 08:43

## 2024-03-05 RX ADMIN — GABAPENTIN 300 MG: 300 CAPSULE ORAL at 08:42

## 2024-03-05 RX ADMIN — CARVEDILOL 3.12 MG: 3.12 TABLET, FILM COATED ORAL at 08:42

## 2024-03-05 RX ADMIN — SEVELAMER CARBONATE 1600 MG: 800 TABLET, FILM COATED ORAL at 11:58

## 2024-03-05 RX ADMIN — INSULIN LISPRO 8 UNITS: 100 INJECTION, SOLUTION INTRAVENOUS; SUBCUTANEOUS at 11:58

## 2024-03-05 RX ADMIN — SODIUM BICARBONATE 1300 MG: 648 TABLET ORAL at 20:34

## 2024-03-05 RX ADMIN — ALLOPURINOL 100 MG: 100 TABLET ORAL at 08:42

## 2024-03-05 RX ADMIN — SEVELAMER CARBONATE 1600 MG: 800 TABLET, FILM COATED ORAL at 18:00

## 2024-03-05 RX ADMIN — SODIUM CHLORIDE, PRESERVATIVE FREE 10 ML: 5 INJECTION INTRAVENOUS at 19:39

## 2024-03-05 RX ADMIN — BUSPIRONE HYDROCHLORIDE 10 MG: 10 TABLET ORAL at 20:35

## 2024-03-05 RX ADMIN — INSULIN LISPRO 20 UNITS: 100 INJECTION, SOLUTION INTRAVENOUS; SUBCUTANEOUS at 17:59

## 2024-03-05 RX ADMIN — CARVEDILOL 3.12 MG: 3.12 TABLET, FILM COATED ORAL at 20:32

## 2024-03-05 RX ADMIN — SODIUM CHLORIDE, PRESERVATIVE FREE 10 ML: 5 INJECTION INTRAVENOUS at 08:44

## 2024-03-05 RX ADMIN — Medication 10 MG: at 20:32

## 2024-03-05 RX ADMIN — HEPARIN SODIUM 5000 UNITS: 5000 INJECTION INTRAVENOUS; SUBCUTANEOUS at 20:35

## 2024-03-05 RX ADMIN — HEPARIN SODIUM 5000 UNITS: 5000 INJECTION INTRAVENOUS; SUBCUTANEOUS at 06:46

## 2024-03-05 RX ADMIN — SODIUM BICARBONATE 1300 MG: 648 TABLET ORAL at 08:43

## 2024-03-05 RX ADMIN — ATORVASTATIN CALCIUM 80 MG: 80 TABLET, FILM COATED ORAL at 20:34

## 2024-03-05 RX ADMIN — SODIUM BICARBONATE 1300 MG: 648 TABLET ORAL at 15:37

## 2024-03-05 RX ADMIN — BUSPIRONE HYDROCHLORIDE 10 MG: 10 TABLET ORAL at 08:42

## 2024-03-05 RX ADMIN — INSULIN LISPRO 20 UNITS: 100 INJECTION, SOLUTION INTRAVENOUS; SUBCUTANEOUS at 08:44

## 2024-03-05 RX ADMIN — POLYETHYLENE GLYCOL 3350 17 G: 17 POWDER, FOR SOLUTION ORAL at 09:14

## 2024-03-05 RX ADMIN — SEVELAMER CARBONATE 1600 MG: 800 TABLET, FILM COATED ORAL at 08:42

## 2024-03-05 RX ADMIN — PRASUGREL 10 MG: 10 TABLET, FILM COATED ORAL at 08:42

## 2024-03-05 RX ADMIN — ACETAMINOPHEN 650 MG: 325 TABLET ORAL at 08:43

## 2024-03-05 RX ADMIN — ACETAMINOPHEN 650 MG: 325 TABLET ORAL at 15:37

## 2024-03-05 RX ADMIN — GABAPENTIN 300 MG: 300 CAPSULE ORAL at 20:34

## 2024-03-05 RX ADMIN — DOCUSATE SODIUM 100 MG: 100 CAPSULE, LIQUID FILLED ORAL at 20:35

## 2024-03-05 RX ADMIN — POTASSIUM CHLORIDE 10 MEQ: 10 CAPSULE, COATED, EXTENDED RELEASE ORAL at 20:35

## 2024-03-05 RX ADMIN — HEPARIN SODIUM 5000 UNITS: 5000 INJECTION INTRAVENOUS; SUBCUTANEOUS at 15:37

## 2024-03-05 ASSESSMENT — PAIN DESCRIPTION - LOCATION
LOCATION: HIP

## 2024-03-05 ASSESSMENT — PAIN DESCRIPTION - ORIENTATION
ORIENTATION: RIGHT

## 2024-03-05 ASSESSMENT — PAIN SCALES - GENERAL
PAINLEVEL_OUTOF10: 2

## 2024-03-06 LAB
ANION GAP SERPL CALCULATED.3IONS-SCNC: 17 MMOL/L (ref 9–16)
BUN SERPL-MCNC: 43 MG/DL (ref 8–23)
CALCIUM SERPL-MCNC: 8.3 MG/DL (ref 8.6–10.4)
CHLORIDE SERPL-SCNC: 88 MMOL/L (ref 98–107)
CO2 SERPL-SCNC: 24 MMOL/L (ref 20–31)
CREAT SERPL-MCNC: 4.2 MG/DL (ref 0.5–0.9)
ERYTHROCYTE [DISTWIDTH] IN BLOOD BY AUTOMATED COUNT: 15.3 % (ref 11.8–14.4)
GFR SERPL CREATININE-BSD FRML MDRD: 11 ML/MIN/1.73M2
GLUCOSE BLD-MCNC: 192 MG/DL (ref 65–105)
GLUCOSE BLD-MCNC: 197 MG/DL (ref 65–105)
GLUCOSE BLD-MCNC: 247 MG/DL (ref 65–105)
GLUCOSE SERPL-MCNC: 352 MG/DL (ref 74–99)
HCT VFR BLD AUTO: 27.8 % (ref 36.3–47.1)
HGB BLD-MCNC: 9.7 G/DL (ref 11.9–15.1)
MCH RBC QN AUTO: 35.5 PG (ref 25.2–33.5)
MCHC RBC AUTO-ENTMCNC: 34.9 G/DL (ref 28.4–34.8)
MCV RBC AUTO: 101.8 FL (ref 82.6–102.9)
NRBC BLD-RTO: 0 PER 100 WBC
PLATELET # BLD AUTO: 171 K/UL (ref 138–453)
PMV BLD AUTO: 10.6 FL (ref 8.1–13.5)
POTASSIUM SERPL-SCNC: 4.1 MMOL/L (ref 3.7–5.3)
RBC # BLD AUTO: 2.73 M/UL (ref 3.95–5.11)
SODIUM SERPL-SCNC: 129 MMOL/L (ref 136–145)
WBC OTHER # BLD: 5 K/UL (ref 3.5–11.3)

## 2024-03-06 PROCEDURE — 80048 BASIC METABOLIC PNL TOTAL CA: CPT

## 2024-03-06 PROCEDURE — 6370000000 HC RX 637 (ALT 250 FOR IP): Performed by: STUDENT IN AN ORGANIZED HEALTH CARE EDUCATION/TRAINING PROGRAM

## 2024-03-06 PROCEDURE — 6370000000 HC RX 637 (ALT 250 FOR IP): Performed by: FAMILY MEDICINE

## 2024-03-06 PROCEDURE — 90935 HEMODIALYSIS ONE EVALUATION: CPT

## 2024-03-06 PROCEDURE — 6360000002 HC RX W HCPCS: Performed by: STUDENT IN AN ORGANIZED HEALTH CARE EDUCATION/TRAINING PROGRAM

## 2024-03-06 PROCEDURE — 82947 ASSAY GLUCOSE BLOOD QUANT: CPT

## 2024-03-06 PROCEDURE — 2060000000 HC ICU INTERMEDIATE R&B

## 2024-03-06 PROCEDURE — 85027 COMPLETE CBC AUTOMATED: CPT

## 2024-03-06 PROCEDURE — 6370000000 HC RX 637 (ALT 250 FOR IP): Performed by: NURSE PRACTITIONER

## 2024-03-06 PROCEDURE — 2580000003 HC RX 258: Performed by: STUDENT IN AN ORGANIZED HEALTH CARE EDUCATION/TRAINING PROGRAM

## 2024-03-06 RX ADMIN — SODIUM BICARBONATE 1300 MG: 648 TABLET ORAL at 08:31

## 2024-03-06 RX ADMIN — SODIUM BICARBONATE 1300 MG: 648 TABLET ORAL at 21:41

## 2024-03-06 RX ADMIN — SODIUM BICARBONATE 1300 MG: 648 TABLET ORAL at 15:34

## 2024-03-06 RX ADMIN — DOCUSATE SODIUM 100 MG: 100 CAPSULE, LIQUID FILLED ORAL at 21:42

## 2024-03-06 RX ADMIN — POTASSIUM CHLORIDE 10 MEQ: 10 CAPSULE, COATED, EXTENDED RELEASE ORAL at 08:33

## 2024-03-06 RX ADMIN — ATORVASTATIN CALCIUM 80 MG: 80 TABLET, FILM COATED ORAL at 21:42

## 2024-03-06 RX ADMIN — INSULIN LISPRO 4 UNITS: 100 INJECTION, SOLUTION INTRAVENOUS; SUBCUTANEOUS at 08:03

## 2024-03-06 RX ADMIN — HEPARIN SODIUM 5000 UNITS: 5000 INJECTION INTRAVENOUS; SUBCUTANEOUS at 15:23

## 2024-03-06 RX ADMIN — ACETAMINOPHEN 650 MG: 325 TABLET ORAL at 21:40

## 2024-03-06 RX ADMIN — SODIUM CHLORIDE, PRESERVATIVE FREE 10 ML: 5 INJECTION INTRAVENOUS at 21:26

## 2024-03-06 RX ADMIN — HEPARIN SODIUM 5000 UNITS: 5000 INJECTION INTRAVENOUS; SUBCUTANEOUS at 06:53

## 2024-03-06 RX ADMIN — BUSPIRONE HYDROCHLORIDE 10 MG: 10 TABLET ORAL at 21:42

## 2024-03-06 RX ADMIN — CARVEDILOL 3.12 MG: 3.12 TABLET, FILM COATED ORAL at 21:42

## 2024-03-06 RX ADMIN — BUSPIRONE HYDROCHLORIDE 10 MG: 10 TABLET ORAL at 08:31

## 2024-03-06 RX ADMIN — SEVELAMER CARBONATE 1600 MG: 800 TABLET, FILM COATED ORAL at 17:37

## 2024-03-06 RX ADMIN — ASPIRIN 81 MG: 81 TABLET, CHEWABLE ORAL at 08:31

## 2024-03-06 RX ADMIN — SODIUM CHLORIDE, PRESERVATIVE FREE 10 ML: 5 INJECTION INTRAVENOUS at 08:34

## 2024-03-06 RX ADMIN — INSULIN LISPRO 20 UNITS: 100 INJECTION, SOLUTION INTRAVENOUS; SUBCUTANEOUS at 08:05

## 2024-03-06 RX ADMIN — Medication 10 MG: at 21:40

## 2024-03-06 RX ADMIN — HEPARIN SODIUM 5000 UNITS: 5000 INJECTION INTRAVENOUS; SUBCUTANEOUS at 21:40

## 2024-03-06 RX ADMIN — ALLOPURINOL 100 MG: 100 TABLET ORAL at 08:31

## 2024-03-06 RX ADMIN — INSULIN LISPRO 20 UNITS: 100 INJECTION, SOLUTION INTRAVENOUS; SUBCUTANEOUS at 15:32

## 2024-03-06 RX ADMIN — GABAPENTIN 300 MG: 300 CAPSULE ORAL at 21:42

## 2024-03-06 RX ADMIN — VENLAFAXINE HYDROCHLORIDE 75 MG: 75 CAPSULE, EXTENDED RELEASE ORAL at 08:32

## 2024-03-06 RX ADMIN — POTASSIUM CHLORIDE 10 MEQ: 10 CAPSULE, COATED, EXTENDED RELEASE ORAL at 21:42

## 2024-03-06 RX ADMIN — BUSPIRONE HYDROCHLORIDE 10 MG: 10 TABLET ORAL at 15:23

## 2024-03-06 RX ADMIN — DOCUSATE SODIUM 100 MG: 100 CAPSULE, LIQUID FILLED ORAL at 08:31

## 2024-03-06 RX ADMIN — SEVELAMER CARBONATE 1600 MG: 800 TABLET, FILM COATED ORAL at 08:31

## 2024-03-06 RX ADMIN — BUMETANIDE 3 MG: 1 TABLET ORAL at 21:41

## 2024-03-06 RX ADMIN — BUMETANIDE 3 MG: 1 TABLET ORAL at 08:31

## 2024-03-06 RX ADMIN — INSULIN GLARGINE 72 UNITS: 100 INJECTION, SOLUTION SUBCUTANEOUS at 08:04

## 2024-03-06 RX ADMIN — INSULIN GLARGINE 42 UNITS: 100 INJECTION, SOLUTION SUBCUTANEOUS at 21:40

## 2024-03-06 RX ADMIN — GABAPENTIN 300 MG: 300 CAPSULE ORAL at 08:32

## 2024-03-06 RX ADMIN — SODIUM CHLORIDE, PRESERVATIVE FREE 10 ML: 5 INJECTION INTRAVENOUS at 08:28

## 2024-03-06 RX ADMIN — PRASUGREL 10 MG: 10 TABLET, FILM COATED ORAL at 08:32

## 2024-03-06 RX ADMIN — INSULIN LISPRO 20 UNITS: 100 INJECTION, SOLUTION INTRAVENOUS; SUBCUTANEOUS at 17:43

## 2024-03-06 RX ADMIN — CARVEDILOL 3.12 MG: 3.12 TABLET, FILM COATED ORAL at 08:32

## 2024-03-06 ASSESSMENT — PAIN SCALES - GENERAL: PAINLEVEL_OUTOF10: 7

## 2024-03-06 ASSESSMENT — PAIN DESCRIPTION - DESCRIPTORS: DESCRIPTORS: ACHING

## 2024-03-06 ASSESSMENT — PAIN DESCRIPTION - LOCATION: LOCATION: FOOT

## 2024-03-06 ASSESSMENT — PAIN DESCRIPTION - ORIENTATION: ORIENTATION: RIGHT;LEFT

## 2024-03-06 NOTE — CARE COORDINATION
Transitional planning:Phone call to Elsa with [facility] to inquire about precert status. It is still pending.

## 2024-03-07 VITALS
SYSTOLIC BLOOD PRESSURE: 137 MMHG | RESPIRATION RATE: 19 BRPM | WEIGHT: 251.32 LBS | DIASTOLIC BLOOD PRESSURE: 60 MMHG | HEART RATE: 71 BPM | BODY MASS INDEX: 41.87 KG/M2 | TEMPERATURE: 97.9 F | OXYGEN SATURATION: 99 % | HEIGHT: 65 IN

## 2024-03-07 LAB
GLUCOSE BLD-MCNC: 174 MG/DL (ref 65–105)
GLUCOSE BLD-MCNC: 227 MG/DL (ref 65–105)
GLUCOSE BLD-MCNC: 285 MG/DL (ref 65–105)

## 2024-03-07 PROCEDURE — 6370000000 HC RX 637 (ALT 250 FOR IP): Performed by: NURSE PRACTITIONER

## 2024-03-07 PROCEDURE — 6360000002 HC RX W HCPCS: Performed by: STUDENT IN AN ORGANIZED HEALTH CARE EDUCATION/TRAINING PROGRAM

## 2024-03-07 PROCEDURE — 94761 N-INVAS EAR/PLS OXIMETRY MLT: CPT

## 2024-03-07 PROCEDURE — 2580000003 HC RX 258: Performed by: STUDENT IN AN ORGANIZED HEALTH CARE EDUCATION/TRAINING PROGRAM

## 2024-03-07 PROCEDURE — 6370000000 HC RX 637 (ALT 250 FOR IP): Performed by: FAMILY MEDICINE

## 2024-03-07 PROCEDURE — 82947 ASSAY GLUCOSE BLOOD QUANT: CPT

## 2024-03-07 PROCEDURE — 6370000000 HC RX 637 (ALT 250 FOR IP): Performed by: STUDENT IN AN ORGANIZED HEALTH CARE EDUCATION/TRAINING PROGRAM

## 2024-03-07 PROCEDURE — 93005 ELECTROCARDIOGRAM TRACING: CPT | Performed by: INTERNAL MEDICINE

## 2024-03-07 PROCEDURE — 97535 SELF CARE MNGMENT TRAINING: CPT

## 2024-03-07 PROCEDURE — 97530 THERAPEUTIC ACTIVITIES: CPT

## 2024-03-07 RX ORDER — AMLODIPINE BESYLATE 2.5 MG/1
2.5 TABLET ORAL DAILY
Status: DISCONTINUED | OUTPATIENT
Start: 2024-03-07 | End: 2024-03-07 | Stop reason: HOSPADM

## 2024-03-07 RX ORDER — AMLODIPINE BESYLATE 2.5 MG/1
2.5 TABLET ORAL DAILY
Qty: 30 TABLET | Refills: 3 | Status: SHIPPED | OUTPATIENT
Start: 2024-03-07

## 2024-03-07 RX ADMIN — SODIUM CHLORIDE, PRESERVATIVE FREE 10 ML: 5 INJECTION INTRAVENOUS at 08:40

## 2024-03-07 RX ADMIN — GABAPENTIN 300 MG: 300 CAPSULE ORAL at 08:27

## 2024-03-07 RX ADMIN — BUSPIRONE HYDROCHLORIDE 10 MG: 10 TABLET ORAL at 14:45

## 2024-03-07 RX ADMIN — POTASSIUM CHLORIDE 10 MEQ: 10 CAPSULE, COATED, EXTENDED RELEASE ORAL at 08:29

## 2024-03-07 RX ADMIN — BUSPIRONE HYDROCHLORIDE 10 MG: 10 TABLET ORAL at 08:29

## 2024-03-07 RX ADMIN — ACETAMINOPHEN 650 MG: 325 TABLET ORAL at 14:46

## 2024-03-07 RX ADMIN — BUMETANIDE 3 MG: 1 TABLET ORAL at 08:25

## 2024-03-07 RX ADMIN — ALLOPURINOL 100 MG: 100 TABLET ORAL at 08:29

## 2024-03-07 RX ADMIN — AMLODIPINE BESYLATE 2.5 MG: 2.5 TABLET ORAL at 16:37

## 2024-03-07 RX ADMIN — HEPARIN SODIUM 5000 UNITS: 5000 INJECTION INTRAVENOUS; SUBCUTANEOUS at 06:13

## 2024-03-07 RX ADMIN — INSULIN GLARGINE 72 UNITS: 100 INJECTION, SOLUTION SUBCUTANEOUS at 08:37

## 2024-03-07 RX ADMIN — ACETAMINOPHEN 650 MG: 325 TABLET ORAL at 05:10

## 2024-03-07 RX ADMIN — INSULIN LISPRO 8 UNITS: 100 INJECTION, SOLUTION INTRAVENOUS; SUBCUTANEOUS at 12:46

## 2024-03-07 RX ADMIN — CARVEDILOL 3.12 MG: 3.12 TABLET, FILM COATED ORAL at 08:30

## 2024-03-07 RX ADMIN — ASPIRIN 81 MG: 81 TABLET, CHEWABLE ORAL at 08:29

## 2024-03-07 RX ADMIN — HEPARIN SODIUM 5000 UNITS: 5000 INJECTION INTRAVENOUS; SUBCUTANEOUS at 14:45

## 2024-03-07 RX ADMIN — CARBOXYMETHYLCELLULOSE SODIUM 1 DROP: 10 GEL OPHTHALMIC at 09:07

## 2024-03-07 RX ADMIN — INSULIN LISPRO 4 UNITS: 100 INJECTION, SOLUTION INTRAVENOUS; SUBCUTANEOUS at 08:38

## 2024-03-07 RX ADMIN — SEVELAMER CARBONATE 1600 MG: 800 TABLET, FILM COATED ORAL at 08:27

## 2024-03-07 RX ADMIN — DOCUSATE SODIUM 100 MG: 100 CAPSULE, LIQUID FILLED ORAL at 08:27

## 2024-03-07 RX ADMIN — PRASUGREL 10 MG: 10 TABLET, FILM COATED ORAL at 08:27

## 2024-03-07 RX ADMIN — INSULIN LISPRO 20 UNITS: 100 INJECTION, SOLUTION INTRAVENOUS; SUBCUTANEOUS at 08:39

## 2024-03-07 RX ADMIN — SEVELAMER CARBONATE 1600 MG: 800 TABLET, FILM COATED ORAL at 12:46

## 2024-03-07 RX ADMIN — SODIUM BICARBONATE 1300 MG: 648 TABLET ORAL at 14:46

## 2024-03-07 RX ADMIN — VENLAFAXINE HYDROCHLORIDE 75 MG: 75 CAPSULE, EXTENDED RELEASE ORAL at 08:27

## 2024-03-07 RX ADMIN — SODIUM BICARBONATE 1300 MG: 648 TABLET ORAL at 08:29

## 2024-03-07 RX ADMIN — INSULIN LISPRO 20 UNITS: 100 INJECTION, SOLUTION INTRAVENOUS; SUBCUTANEOUS at 12:47

## 2024-03-07 ASSESSMENT — PAIN DESCRIPTION - LOCATION
LOCATION: BACK;LEG
LOCATION: FOOT

## 2024-03-07 ASSESSMENT — PAIN DESCRIPTION - DESCRIPTORS
DESCRIPTORS: ACHING
DESCRIPTORS: ACHING;DULL

## 2024-03-07 ASSESSMENT — PAIN SCALES - GENERAL
PAINLEVEL_OUTOF10: 8
PAINLEVEL_OUTOF10: 4

## 2024-03-07 ASSESSMENT — PAIN DESCRIPTION - ORIENTATION: ORIENTATION: RIGHT;LEFT

## 2024-03-07 NOTE — DISCHARGE SUMMARY
[facility]  Discharge Note                 Name:  Estefany Garcia  YOB: 1960  Social Security Number:  [id number]  Medical Record Number:  [id number]    Date of Admission:  2/28/2024  Date of Discharge:  3/7/2024    Admitting physician: Jairo Hurley MD    Discharge Attending: AFSANEH HILTON CNP CNP  Primary Care Physician: Zulma Payne APRN - CNP  Consultants: Cardiology  Discharge to Home  Stable Condition   HOSPITAL ADMISSION PROBLEM LIST:  Patient Active Problem List   Diagnosis    Atherosclerosis of coronary artery bypass graft of native heart with angina pectoris ([facility])    Acute kidney injury superimposed on CKD ([facility])    Acute on chronic diastolic heart failure ([facility])    Diabetic polyneuropathy associated with type 2 diabetes mellitus ([facility])    History of coronary artery bypass graft    Iron deficiency anemia    Spinal stenosis of lumbar region with neurogenic claudication    Mixed hyperlipidemia    CKD (chronic kidney disease) stage 4, GFR 15-29 ml/min ([facility])    Type 2 diabetes 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, with long-term current use of insulin ([facility])    Obesity, Class II, BMI 35-39.9    Thyroid nodule greater than or equal to 1 cm in diameter incidentally noted on imaging study    Essential hypertension    Anemia of chronic disease    Chronic ischemic heart disease    Ischemic stroke of frontal lobe ([facility])    Morbid obesity with BMI of 40.0-44.9, adult ([facility])    Disequilibrium syndrome    Anxiety    Chronic midline low back pain with bilateral sciatica    Acute thoracic back pain    COVID    Delirium due to another medical condition    Cerebral hypoperfusion    Immature arteriovenous fistula ([facility])    History of fusion of cervical spine    Depression with anxiety    Vancomycin resistant Enterococcus UTI    ESRD on hemodialysis ([facility])    Dialysis disequilibrium syndrome    Acute cystitis without hematuria    VRE infection (vancomycin

## 2024-03-07 NOTE — CARE COORDINATION
Transitional planning: Phone call from [facility] with [facility]. They have received authorization and can take patient today.    1448 Notified patient and DEDE Cannon of the above.    1510 Transport request faxed to [facility] with 5 pm requested transport time.    1542 Phone call from [facility] with [facility]. Transport confirmed for 5 pm. DEDE Duggan notified.    1545 Phone call to [facility] with [facility] to notify of transport time and obtain report #[phone] and fax #[phone].    1553 ANTHONY/AVS faxed to [facility]. Blue transport packet placed with soft chart.    Discharge Report    [facility]  Clinical Case Management Department  Written by: Neelam Thompson RN    Patient Name: Estefany Garcia  Attending Provider: Jairo Hurley MD  Admit Date: 2024  2:11 PM  MRN: [id number]  Account: [id number]                     : 1960  Discharge Date: 3/7/2024      Disposition: Acute Rehab-[facility]    Neelam Thompson RN

## 2024-03-07 NOTE — PROGRESS NOTES
[facility]  Progress Note                   Date:   3/5/2024  Patient name: Estefany Garcia  Date of admission:  2/28/2024  2:11 PM  MRN:   [id number]  YOB: 1960  PCP: Zulma Payne APRN - CNP    Reason for Admission: Abnormal stress test [R94.39]  Status post cardiac catheterization [Z98.890]    Subjective:       Clinical Changes /Abnormalities: Patient seen and examined in room. Denies CP or SOB. No acute CV events overnight. Labs, vitals, and tele reviewed. Awaiting precert for SNP      Review of Systems    Medications:   Scheduled Meds:   insulin glargine  72 Units SubCUTAneous Daily    insulin glargine  42 Units SubCUTAneous Nightly    insulin lispro  20 Units SubCUTAneous TID WC    sodium chloride flush  10 mL IntraVENous 2 times per day    heparin (porcine)  5,000 Units SubCUTAneous 3 times per day    insulin lispro  0-16 Units SubCUTAneous TID WC    insulin lispro  0-4 Units SubCUTAneous Nightly    aspirin  81 mg Oral Daily    prasugrel  10 mg Oral Daily    allopurinol  100 mg Oral Daily    atorvastatin  80 mg Oral Nightly    bumetanide  3 mg Oral BID    busPIRone  10 mg Oral TID    carvedilol  3.125 mg Oral BID    docusate sodium  100 mg Oral BID    gabapentin  300 mg Oral BID    lidocaine  1 patch TransDERmal Daily    potassium chloride  10 mEq Oral BID    sevelamer  1,600 mg Oral TID WC    sodium bicarbonate  1,300 mg Oral TID    venlafaxine  75 mg Oral Daily with breakfast    sodium chloride flush  5-40 mL IntraVENous 2 times per day    [Held by provider] epoetin  3,000 Units SubCUTAneous Once per day on Mon Wed Fri     Continuous Infusions:   sodium chloride      sodium chloride       CBC:   No results for input(s): \"WBC\", \"HGB\", \"PLT\" in the last 72 hours.    BMP:    Recent Labs     03/04/24  0645   *   K 4.5   CL 94*   CO2 24   BUN 52*   CREATININE 5.2*   GLUCOSE 164*       Hepatic:No results for input(s): \"AST\", \"ALT\", \"ALB\", \"BILITOT\", \"ALKPHOS\" in the last 
Dialysis Post Treatment Note  Vitals:    02/29/24 1512   BP: (!) 150/65   Pulse: 72   Resp: 16   Temp: 97.9 °F (36.6 °C)   SpO2:      Pre-Weight = 114 kg  Post-weight = Weight - Scale: 111.5 kg (245 lb 13 oz)  Total Liters Processed = Blood Volume Processed (Liters): 73.18 L  Rinseback Volume (mL) = Rinseback Volume (ml): 300 ml  Net Removal (mL) = 2500 ML   Patient's dry weight=  Type of access used=  Left AVF  Length of treatment=3.5 Hours    Pt tolerated tx well, no s/s of distress noted, 2.5 Liters removed, AVF worked well, post tx hemostasis achieved, gauze, bandaids and tape applied to sites.Report given to Primary RN Tommie KHOURY    
Dialysis Post Treatment Note  Vitals:    03/01/24 1248   BP: (!) 124/57   Pulse: 64   Resp: 17   Temp: 98 °F (36.7 °C)   SpO2:      Pre-Weight = 111.9kg  Post-weight = Weight - Scale: 110.2 kg (242 lb 15.2 oz)  Total Liters Processed = Blood Volume Processed (Liters): 44.76 L  Rinseback Volume (mL) = Rinseback Volume (ml): 270 ml  Net Removal (mL) =  1500  Type of access used=AVF  Length of treatment=210    Patient tolerated treatment well with no complaints.    
Dialysis Post Treatment Note  Vitals:    03/04/24 1514   BP: (!) 127/57   Pulse: 63   Resp: 12   Temp: 98.2 °F (36.8 °C)   SpO2:      Pre-Weight = 112.5kg  Post-weight = Weight - Scale: 110.8 kg (244 lb 4.3 oz)  Total Liters Processed = Blood Volume Processed (Liters): 80.36 L  Rinseback Volume (mL) = Rinseback Volume (ml): 260 ml  Net Removal (mL) =  2040  Patient's dry weight= 112kg  Type of access used= AVF Left  Length of treatment=212      Tx completed  & tolerated well with 2L removed. Vitals stable during tx. Post needle removal, hemostasis achieved 15 mins with arterial fistula. No other complication. Report called to primary nurse Christiano SPRINGER RN.   
Dialysis Time Out  To be done by RN and chhaya or 2 RNs  Staff Names Augustin SCHAFFER RN and Kayli Aguillon    [x]  Identity of the patient using 2 patient identifiers  [x]  Consent for treatment  [x]  Equipment-proper machine and dialyzer  [x]  B-Hep B status  [x]  Orders- to include bath, blood flow, dialyzer, time and fluid removal  [x]  Access-Correct site and in working order  [x]  Time for patient to ask questions.    
Dialysis Time Out  To be done by RN and tech or 2 RNs  Staff Names Augustin SCHAFFER RN and Kayli JUÁREZ RN    [x]  Identity of the patient using 2 patient identifiers  [x]  Consent for treatment  [x]  Equipment-proper machine and dialyzer  [x]  B-Hep B status  [x]  Orders- to include bath, blood flow, dialyzer, time and fluid removal  [x]  Access-Correct site and in working order  [x]  Time for patient to ask questions.    
Dialysis Time Out  To be done by RN and tech or 2 RNs  Staff Names Augustin SCHAFFER RN and Quinton MOSQUEDA RN    [x]  Identity of the patient using 2 patient identifiers  [x]  Consent for treatment  [x]  Equipment-proper machine and dialyzer  [x]  B-Hep B status  [x]  Orders- to include bath, blood flow, dialyzer, time and fluid removal  [x]  Access-Correct site and in working order  [x]  Time for patient to ask questions.    
Dialysis Time Out  To be done by RN and tech or 2 RNs  Staff Names Mark METZ RN & Sabra BOX RN    [x]  Identity of the patient using 2 patient identifiers  [x]  Consent for treatment  [x]  Equipment-proper machine and dialyzer  [x]  B-Hep B status  [x]  Orders- to include bath, blood flow, dialyzer, time and fluid removal  [x]  Access-Correct site and in working order  [x]  Time for patient to ask questions.   
Nephrology Progress Note        Subjective: Patient is seen and examined on hemodialysis.  Patient is stable on dialysis.  She is on room air.  Her weight is about 111.9 kg today.  Goal fluid removal about 1.5 kg.  Outpatient estimated dry weight listed at 112.5 kg.  Dialysis access is working reasonably well.  Awaiting rehab spot at [facility].  Outpatient nephrologist is Dr. Jurado.  No chest pain or shortness of breath or nausea or vomiting.  She is ambulating with the use of a walker.  She will be having a physical medicine assessment later today, hopefully she would like to go to [facility] inpatient rehab.      Objective:  CURRENT TEMPERATURE:  Temp: 98.4 °F (36.9 °C)  MAXIMUM TEMPERATURE OVER 24HRS:  Temp (24hrs), Av °F (36.7 °C), Min:97.5 °F (36.4 °C), Max:98.4 °F (36.9 °C)    CURRENT RESPIRATORY RATE:  Respirations: 16  CURRENT PULSE:  Pulse: 66  CURRENT BLOOD PRESSURE:  BP: (!) 130/52  24HR BLOOD PRESSURE RANGE:  Systolic (24hrs), Av , Min:115 , Max:161   ; Diastolic (24hrs), Av, Min:42, Max:113    24HR INTAKE/OUTPUT:    Intake/Output Summary (Last 24 hours) at 3/1/2024 1006  Last data filed at 2024 1512  Gross per 24 hour   Intake 2800 ml   Output 300 ml   Net 2500 ml     Patient Vitals for the past 96 hrs (Last 3 readings):   Weight   24 0900 111.9 kg (246 lb 11.1 oz)   24 0600 111.5 kg (245 lb 13 oz)   24 1512 111.5 kg (245 lb 13 oz)         Physical Exam:  General appearance:Awake, alert, in no acute distress  Skin: warm and dry, no rash or erythema  Eyes: conjunctivae normal and sclera anicteric  ENT:no thrush, moist mucous membranes  Neck:  No JVD, midline trachea, no accessory muscle use  Pulmonary: Good bilateral air entry and clear to auscultation bilaterally and symmetrically without wheezes or rales or rhonchi   Cardiovascular: Regular rate and rhythm with positive S1 and S2, no rubs  Abdomen: soft non-tender, bowel sounds present, no ascites   
Nutrition Assessment     Type and Reason for Visit: Initial, RD Nutrition Re-Screen/LOS    Nutrition Recommendations/Plan:   Liberalize diet as able.  Encourage/monitor intakes as tolerated.  Monitor plan of care.     Malnutrition Assessment:  Malnutrition Status: No malnutrition    Nutrition Assessment:  Chart reviewed/pt seen for length of stay.  Admitted s/p abnormal stress test.  PMH: ESRD on HD, h/o CVA, CAD, DM, HTN.  Pt with a good appetite and consuming % of meals.  Weight fluctuations per chart review.  Labs reviewed: Na 129 mmol/L, Cl 88 mmol/L, Glucose 247-352 mg/dL, BUN 43 mg/dL, CR 4.2 mg/dL.  Meds reviewed.    Nutrition Related Findings:   labs/Meds reviewed. Wound Type: None    Current Nutrition Therapies:    ADULT DIET; Regular; 3 carb choices (45 gm/meal); Low Fat/Low Chol/High Fiber/JESUS MANUEL    Anthropometric Measures:  Height: 165.1 cm (5' 5\")  Current Body Wt: 114 kg (251 lb 5.2 oz)   BMI: 41.8    Nutrition Diagnosis:   No nutrition diagnosis at this time    Nutrition Interventions:   Food and/or Nutrient Delivery: Modify Current Diet  Nutrition Education/Counseling: No recommendation at this time  Coordination of Nutrition Care: Continue to monitor while inpatient  Plan of Care discussed with: Patient    Nutrition Monitoring and Evaluation:   Behavioral-Environmental Outcomes: None Identified  Food/Nutrient Intake Outcomes: Food and Nutrient Intake  Physical Signs/Symptoms Outcomes: Biochemical Data, GI Status, Fluid Status or Edema, Weight, Skin    Discharge Planning:    No discharge needs at this time     Shy Sanchez RD, LD  Contact: [phone]  
Occupational Therapy    [facility]  Occupational Therapy Not Seen Note    DATE: 3/6/2024    NAME: Estefany Garcia  MRN: [id number]   : 1960      Patient not seen this date for Occupational Therapy due to: HD    Next Scheduled Treatment: 3/7/24    Electronically signed by DAYANA WILDER on 3/6/2024 at 10:26 AM    
Occupational Therapy    [facility]  Occupational Therapy Not Seen Note    DATE: 3/1/2024    NAME: Estefany Garcia  MRN: [id number]   : 1960      Patient not seen this date for Occupational Therapy due to:    Hemodialysis:    Next Scheduled Treatment: 3/2    Electronically signed by DAYANA Matthews on 3/1/2024 at 9:32 AM   
Occupational Therapy    [facility]  Occupational Therapy Not Seen Note    DATE: 3/4/2024    NAME: Estefany Garcia  MRN: [id number]   : 1960      Patient not seen this date for Occupational Therapy due to: HD    Next Scheduled Treatment: 3/5/24    Electronically signed by DAYANA WILDER on 3/4/2024 at 2:43 PM    
Occupational Therapy  Facility/Department: [address] STEPDOWN  Occupational Therapy Daily Treatment Note    Name: Estefany Garcia  : 1960  MRN: [id number]  Date of Service: 3/7/2024    Discharge Recommendations:  Patient would benefit from continued therapy after discharge  OT Equipment Recommendations  Other: continue to assess    Patient Diagnosis(es): The encounter diagnosis was Abnormal stress test.  Past Medical History:  has a past medical history of Arthritis, Backache, unspecified, CAD (coronary artery disease), Cerebral artery occlusion with cerebral infarction (HCC), CHF (congestive heart failure) (HCC), Chronic kidney disease, Coronary atherosclerosis of artery bypass graft, COVID, Cramp of limb, Epistaxis, Gallstones, Hemodialysis patient (HCC), Hyperlipidemia, Hypertension, Insomnia, Neuromuscular disorder (HCC), Pneumonia, Psychiatric problem, Thyroid disease, Type II or unspecified type diabetes mellitus with renal manifestations, not stated as uncontrolled(250.40), Type II or unspecified type diabetes mellitus without mention of complication, not stated as uncontrolled, and Unspecified vitamin D deficiency.  Past Surgical History:  has a past surgical history that includes Coronary artery bypass graft (2005); Knee arthroscopy; Carpal tunnel release; Breast surgery; Tonsillectomy; Hand surgery; Ankle fracture surgery; Cholecystectomy, open (N/A); IR TUNNELED CVC PLACE WO SQ PORT/PUMP > 5 YEARS (2021); AV fistula creation (2021); Dialysis fistula creation (Left, 2021); other surgical history (2022); back surgery; Colonoscopy; eye surgery; fracture surgery; Cardiac surgery; IR TUNNELED CVC PLACE WO SQ PORT/PUMP > 5 YEARS (2023); IR TUNNELED CVC PLACE WO SQ PORT/PUMP > 5 YEARS (Right, 2023); Dialysis Catheter Insertion (Right, 2023); other surgical history (Left, 2023); Coronary angioplasty with stent (2023); Cardiac catheterization (); 
Occupational Therapy  Facility/Department: [address] STEPDOWN  Occupational Therapy Daily Treatment Note    Name: Estefany Garcia  : 1960  MRN: [id number]  Date of Service: 3/4/2024    Discharge Recommendations:  Patient would benefit from continued therapy after discharge          Patient Diagnosis(es): The encounter diagnosis was Abnormal stress test.  Past Medical History:  has a past medical history of Arthritis, Backache, unspecified, CAD (coronary artery disease), Cerebral artery occlusion with cerebral infarction (HCC), CHF (congestive heart failure) (HCC), Chronic kidney disease, Coronary atherosclerosis of artery bypass graft, COVID, Cramp of limb, Epistaxis, Gallstones, Hemodialysis patient (HCC), Hyperlipidemia, Hypertension, Insomnia, Neuromuscular disorder (HCC), Pneumonia, Psychiatric problem, Thyroid disease, Type II or unspecified type diabetes mellitus with renal manifestations, not stated as uncontrolled(250.40), Type II or unspecified type diabetes mellitus without mention of complication, not stated as uncontrolled, and Unspecified vitamin D deficiency.  Past Surgical History:  has a past surgical history that includes Coronary artery bypass graft (2005); Knee arthroscopy; Carpal tunnel release; Breast surgery; Tonsillectomy; Hand surgery; Ankle fracture surgery; Cholecystectomy, open (N/A); IR TUNNELED CVC PLACE WO SQ PORT/PUMP > 5 YEARS (2021); AV fistula creation (2021); Dialysis fistula creation (Left, 2021); other surgical history (2022); back surgery; Colonoscopy; eye surgery; fracture surgery; Cardiac surgery; IR TUNNELED CVC PLACE WO SQ PORT/PUMP > 5 YEARS (2023); IR TUNNELED CVC PLACE WO SQ PORT/PUMP > 5 YEARS (Right, 2023); Dialysis Catheter Insertion (Right, 2023); other surgical history (Left, 2023); Coronary angioplasty with stent (2023); Cardiac catheterization (); Coronary angioplasty with stent (); Dialysis 
Patent transferred to room 431 per cart and writer. Patient assesment unchanged.  
Patient admitted,arrived per stretcher per [facility].consent signed and questions answered. Patient ready for procedure. Call light to reach with side rails up 2 of 2.   
Patient eating meal.  
Patient returned to room. Post cath recovery initiated. Patient awake and alert, denies any complaints. Right groin with dressing intact. No hematoma or drainage noted.    Side rails up times 2, call light with in reach.    Patient closely monitored.  
Patient sitting  up in bed, watching TV and drinking soda. Right groin remains without hematoma or drainage.  
Physical Therapy        Physical Therapy Cancel Note      DATE: 3/1/2024    NAME: Estefany Garcia  MRN: [id number]   : 1960      Patient not seen this date for Physical Therapy due to:    Patient unavailable for treatment. At dialysis.      Electronically signed by Cayla Kimble PT on 3/1/2024 at 8:50 AM      
Physical Therapy        Physical Therapy Cancel Note      DATE: 3/4/2024    NAME: Estefany Garcia  MRN: [id number]   : 1960      Patient not seen this date for Physical Therapy due to:    Hemodialysis: Ck 3/5      Electronically signed by Olivia Dorman PT on 3/4/2024 at 2:21 PM      
Physical Therapy        Physical Therapy Cancel Note      DATE: 3/6/2024    NAME: Estefany Garcia  MRN: [id number]   : 1960      Patient not seen this date for Physical Therapy due to:    Hemodialysis:Will check back 3/7.      Electronically signed by Kera Alcocer PTA on 3/6/2024 at 9:35 AM     
Physical Therapy  Facility/Department: [address] STEPDOWN  Physical Therapy Treatment Note    Name: Estefany Garcia  : 1960  MRN: [id number]  Date of Service: 3/5/2024    Discharge Recommendations:  Further therapy recommended at discharge.The patient should be able to tolerate at least 3 hours of therapy per day over 5 days or 15 hours over 7 days.   This patient may benefit from a Physical Medicine and Rehab consult.    PT Equipment Recommendations  Equipment Needed: No  Other: Reports using  4 wheeled walker at home.      Patient Diagnosis(es): The encounter diagnosis was Abnormal stress test.  Past Medical History:  has a past medical history of Arthritis, Backache, unspecified, CAD (coronary artery disease), Cerebral artery occlusion with cerebral infarction (HCC), CHF (congestive heart failure) (HCC), Chronic kidney disease, Coronary atherosclerosis of artery bypass graft, COVID, Cramp of limb, Epistaxis, Gallstones, Hemodialysis patient (HCC), Hyperlipidemia, Hypertension, Insomnia, Neuromuscular disorder (HCC), Pneumonia, Psychiatric problem, Thyroid disease, Type II or unspecified type diabetes mellitus with renal manifestations, not stated as uncontrolled(250.40), Type II or unspecified type diabetes mellitus without mention of complication, not stated as uncontrolled, and Unspecified vitamin D deficiency.  Past Surgical History:  has a past surgical history that includes Coronary artery bypass graft (2005); Knee arthroscopy; Carpal tunnel release; Breast surgery; Tonsillectomy; Hand surgery; Ankle fracture surgery; Cholecystectomy, open (N/A); IR TUNNELED CVC PLACE WO SQ PORT/PUMP > 5 YEARS (2021); AV fistula creation (2021); Dialysis fistula creation (Left, 2021); other surgical history (2022); back surgery; Colonoscopy; eye surgery; fracture surgery; Cardiac surgery; IR TUNNELED CVC PLACE WO SQ PORT/PUMP > 5 YEARS (2023); IR TUNNELED CVC PLACE WO SQ PORT/PUMP > 5 
Physical Therapy  Facility/Department: [address] STEPDOWN  Physical Therapy Initial Assessment    Name: Estefany Garcia  : 1960  MRN: [id number]  Date of Service: 2024    Discharge Recommendations:   Further therapy recommended at discharge.The patient should be able to tolerate at least 3 hours of therapy per day over 5 days or 15 hours over 7 days.   This patient may benefit from a Physical Medicine and Rehab consult.     PT Equipment Recommendations  Equipment Needed: No    Patient with past medical history of systemic hypertension, osteoarthritis, CAD-s/p CABG with subsequent graft stent, s/p cardiovascular accident, type 2 diabetes mellitus and end-stage renal disease secondary to hypertension and diabetes-on regular dialysis  at [facility] dialysis unit [facility] urinary care of Dr. Graham using left arm AV fistula.   Patient initially presented to the [facility] hospital with complaints of fluid retention, peripheral edema and abdominal distention and chest pressure.  Was having intradialytic weight gain and was advised to have 4 days a week dialysis with additional dialysis on Saturday.  Patient underwent a stress test which was abnormal and was transferred to [facility] for cardiac cath.  No report on the chart however patient stated she underwent a cardiac cath last evening with 2 balloon angioplasty without stent placement done.      Patient Diagnosis(es): The encounter diagnosis was Abnormal stress test.  Past Medical History:  has a past medical history of Arthritis, Backache, unspecified, CAD (coronary artery disease), Cerebral artery occlusion with cerebral infarction ([facility]), CHF (congestive heart failure) ([facility]), Chronic kidney disease, Coronary atherosclerosis of artery bypass graft, COVID, Cramp of limb, Epistaxis, Gallstones, Hemodialysis patient ([facility]), Hyperlipidemia, Hypertension, Insomnia, Neuromuscular disorder ([facility]), Pneumonia, Psychiatric problem, Thyroid disease, 
Renal Progress Note    Patient :  Estefany Garcia; 63 y.o. MRN# [id number]  Location:  0541/0541-01  Attending:  Jairo Hurley MD  Admit Date:  2/28/2024   Hospital Day: 5    Patient seen in dialysis  Subjective:       Patient seen and examined, sitting up in chair in room.   Denies CP/SOB.   Labs reviewed.     Patient had a dialysis Friday with 1500 mL removed  Patient remained hemodynamically stable  Saturating well on room air   Patient has no acute complaints this morning  Has bilateral leg swelling +1    Patient wants to go for acute rehab. Plan for [facility]  Labs today showed a sodium 133 potassium 4.5 chloride 94 bicarb 24 BUN 52 creatinine 5.2  History of Present Illness:     Estefany Garcia; 63 y.o. female with past medical history as mentioned below.      Patient with past medical history of systemic hypertension, osteoarthritis, CAD-s/p CABG with subsequent graft stent, s/p cardiovascular accident, type 2 diabetes mellitus and end-stage renal disease secondary to hypertension and diabetes-on regular dialysis Monday Wednesday Friday at [facility] dialysis unit [facility] urinary care of Dr. Graham using left arm AV fistula.   Patient initially presented to the [facility] hospital with complaints of fluid retention, peripheral edema and abdominal distention and chest pressure.  Was having intradialytic weight gain and was advised to have 4 days a week dialysis with additional dialysis on Saturday.  Patient underwent a stress test which was abnormal and was transferred to [facility] for cardiac cath.  No report on the chart however patient stated she underwent a cardiac cath last evening- Patent LIMA-LAD. Patent SVG-Diagonal with patent stent. RCA and RPDA reduced to 0% with balloon PTCA       Patient has a history of end-stage renal disease.  She follows outpatient Dr. Graham.  She does have AV fistula in the left upper extremity.  Patient has history of essential hypertension, history of diabetes mellitus, 
Renal Progress Note    Patient :  Estefany Garcia; 63 y.o. MRN# [id number]  Location:  0541/0541-01  Attending:  Jairo Hurley MD  Admit Date:  2/28/2024   Hospital Day: 4    Subjective:       Patient seen and examined, sitting up in chair in room.   Denies CP/SOB.   Labs reviewed.   No BMP today.     Patient had a dialysis Friday with 1500 mL removed  Patient remained hemodynamically stable  Saturating well on room air   Patient has no acute complaints this morning  Has bilateral leg swelling +1    Patient wants to go for acute rehab. Plan for [facility]    History of Present Illness:     Estefany Garcia; 63 y.o. female with past medical history as mentioned below.      Patient with past medical history of systemic hypertension, osteoarthritis, CAD-s/p CABG with subsequent graft stent, s/p cardiovascular accident, type 2 diabetes mellitus and end-stage renal disease secondary to hypertension and diabetes-on regular dialysis Monday Wednesday Friday at [facility] dialysis unit [facility] urinary care of Dr. Graham using left arm AV fistula.   Patient initially presented to the [facility] hospital with complaints of fluid retention, peripheral edema and abdominal distention and chest pressure.  Was having intradialytic weight gain and was advised to have 4 days a week dialysis with additional dialysis on Saturday.  Patient underwent a stress test which was abnormal and was transferred to [facility] for cardiac cath.  No report on the chart however patient stated she underwent a cardiac cath last evening- Patent LIMA-LAD. Patent SVG-Diagonal with patent stent. RCA and RPDA reduced to 0% with balloon PTCA       Patient has a history of end-stage renal disease.  She follows outpatient Dr. Graham.  She does have AV fistula in the left upper extremity.  Patient has history of essential hypertension, history of diabetes mellitus, history of hypothyroidism, lately she has had some issues with volume overload and possible fluid 
Renal Progress Note    Patient :  Estefany Garcia; 63 y.o. MRN# [id number]  Location:  0541/0541-01  Attending:  Emma Meza MD  Admit Date:  2/28/2024   Hospital Day: 2    Subjective:       Patient had a dialysis yesterday with 2500 mL removed  Patient remained hemodynamically stable  Saturating well on room air   Patient has no acute complaints this morning  Has bilateral leg swelling +1  Will go for dialysis today.  Patient wants to go for acute rehab.  PM&R evaluation is pending    History of Present Illness:     Estefany Garcia; 63 y.o. female with past medical history as mentioned below.      Patient with past medical history of systemic hypertension, osteoarthritis, CAD-s/p CABG with subsequent graft stent, s/p cardiovascular accident, type 2 diabetes mellitus and end-stage renal disease secondary to hypertension and diabetes-on regular dialysis Monday Wednesday Friday at [facility] dialysis unit [facility] urinary care of Dr. Graham using left arm AV fistula.   Patient initially presented to the [facility] with complaints of fluid retention, peripheral edema and abdominal distention and chest pressure.  Was having intradialytic weight gain and was advised to have 4 days a week dialysis with additional dialysis on Saturday.  Patient underwent a stress test which was abnormal and was transferred to [facility] for cardiac cath.  No report on the chart however patient stated she underwent a cardiac cath last evening- Patent LIMA-LAD. Patent SVG-Diagonal with patent stent. RCA and RPDA reduced to 0% with balloon PTCA       Patient has a history of end-stage renal disease.  She follows outpatient Dr. Graham.  She does have AV fistula in the left upper extremity.  Patient has history of essential hypertension, history of diabetes mellitus, history of hypothyroidism, lately she has had some issues with volume overload and possible fluid abuse.  She does have fairly significant lower extremity bilateral 
Renal Progress Note    Patient :  Estefany Garcia; 63 y.o. MRN# [id number]  Location:  0541/0541-01  Attending:  Jairo Hurley MD  Admit Date:  2/28/2024   Hospital Day: 6    Patient seen in dialysis  Subjective:       Patient seen and examined, sitting up in chair in room.   Denies CP/SOB.  Patient underwent cardiac catheterization and angioplasty was performed.  Her dialysis was uneventful yesterday.  Patient voiced no complaint    Patient had a dialysis yesterday with 2 L removal.  Patient remained hemodynamically stable  Saturating well on room air   Patient has no acute complaints this morning  Has bilateral leg swelling +1    Patient wants to go for acute rehab. Plan for [facility]  Latest labs showed a sodium 133 potassium 4.5 chloride 94 bicarb 24 BUN 52 creatinine 5.2  History of Present Illness:     Estefany Garcia; 63 y.o. female with past medical history as mentioned below.      Patient with past medical history of systemic hypertension, osteoarthritis, CAD-s/p CABG with subsequent graft stent, s/p cardiovascular accident, type 2 diabetes mellitus and end-stage renal disease secondary to hypertension and diabetes-on regular dialysis Monday Wednesday Friday at [facility] dialysis unit [facility] urinary care of Dr. Graham using left arm AV fistula.   Patient initially presented to the [facility] with complaints of fluid retention, peripheral edema and abdominal distention and chest pressure.  Was having intradialytic weight gain and was advised to have 4 days a week dialysis with additional dialysis on Saturday.  Patient underwent a stress test which was abnormal and was transferred to [facility] for cardiac cath.  No report on the chart however patient stated she underwent a cardiac cath last evening- Patent LIMA-LAD. Patent SVG-Diagonal with patent stent. RCA and RPDA reduced to 0% with balloon PTCA       Patient has a history of end-stage renal disease.  She follows outpatient Dr. Graham.  She 
Report called to RN per writer.  
Acute on chronic diastolic heart failure ([facility])     Diabetic polyneuropathy associated with type 2 diabetes mellitus ([facility])     History of coronary artery bypass graft     Iron deficiency anemia     Spinal stenosis of lumbar region with neurogenic claudication     Mixed hyperlipidemia     CKD (chronic kidney disease) stage 4, GFR 15-29 ml/min ([facility])     Type 2 diabetes 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, with long-term current use of insulin ([facility])     Obesity, Class II, BMI 35-39.9     Thyroid nodule greater than or equal to 1 cm in diameter incidentally noted on imaging study     Essential hypertension     Anemia of chronic disease     Chronic ischemic heart disease     Ischemic stroke of frontal lobe ([facility])     Morbid obesity with BMI of 40.0-44.9, adult ([facility])     Disequilibrium syndrome     Anxiety     Chronic midline low back pain with bilateral sciatica     Acute thoracic back pain     COVID     Delirium due to another medical condition     Cerebral hypoperfusion     Immature arteriovenous fistula ([facility])     History of fusion of cervical spine     Depression with anxiety     Vancomycin resistant Enterococcus UTI     ESRD on hemodialysis ([facility])     Dialysis disequilibrium syndrome     Acute cystitis without hematuria     VRE infection (vancomycin resistant Enterococcus)     Infection due to ESBL-producing Klebsiella pneumoniae     Class 2 severe obesity due to excess calories with serious comorbidity and body mass index (BMI) of 35.0 to 35.9 in adult ([facility])     Type 2 diabetes mellitus with hyperglycemia, with long-term current use of insulin ([facility])     Right hemiparesis ([facility])     Ulcer of left foot, limited to breakdown of skin ([facility])     Secondary hyperparathyroidism ([facility])     Hypertensive urgency     Hypoxia     Congestive heart failure ([facility])     Nephrotic range proteinuria     Acute respiratory failure with hypoxia ([facility])     Syncope and collapse     Subacute cough     Acute on chronic heart 
diabetes mellitus ([facility])     History of coronary artery bypass graft     Iron deficiency anemia     Spinal stenosis of lumbar region with neurogenic claudication     Mixed hyperlipidemia     CKD (chronic kidney disease) stage 4, GFR 15-29 ml/min ([facility])     Type 2 diabetes 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, with long-term current use of insulin ([facility])     Obesity, Class II, BMI 35-39.9     Thyroid nodule greater than or equal to 1 cm in diameter incidentally noted on imaging study     Essential hypertension     Anemia of chronic disease     Chronic ischemic heart disease     Ischemic stroke of frontal lobe ([facility])     Morbid obesity with BMI of 40.0-44.9, adult ([facility])     Disequilibrium syndrome     Anxiety     Chronic midline low back pain with bilateral sciatica     Acute thoracic back pain     COVID     Delirium due to another medical condition     Cerebral hypoperfusion     Immature arteriovenous fistula ([facility])     History of fusion of cervical spine     Depression with anxiety     Vancomycin resistant Enterococcus UTI     ESRD on hemodialysis ([facility])     Dialysis disequilibrium syndrome     Acute cystitis without hematuria     VRE infection (vancomycin resistant Enterococcus)     Infection due to ESBL-producing Klebsiella pneumoniae     Class 2 severe obesity due to excess calories with serious comorbidity and body mass index (BMI) of 35.0 to 35.9 in adult ([facility])     Type 2 diabetes mellitus with hyperglycemia, with long-term current use of insulin ([facility])     Right hemiparesis ([facility])     Ulcer of left foot, limited to breakdown of skin ([facility])     Secondary hyperparathyroidism ([facility])     Hypertensive urgency     Hypoxia     Congestive heart failure ([facility])     Nephrotic range proteinuria     Acute respiratory failure with hypoxia ([facility])     Syncope and collapse     Subacute cough     Acute on chronic heart failure, unspecified heart failure type ([facility])     Diarrhea of presumed infectious origin     
CK      Goals  Short Term Goals  Time Frame for Short Term Goals: 14 visits  Short Term Goal 1: Pt indep with bed mobility  Short Term Goal 2: Pt indep with transfers with proper safety awareness  Short Term Goal 3: Pt amb 150 ft with least restrictive device independently, no loss of balance  Short Term Goal 4: Pt negotiate 4 stairs with SUP and rails, proper safety  Short Term Goal 5: Pt tolerate 25 minutes ther ex and activity to improve balance and strength for functional mobility  Patient Goals   Patient Goals : To return home       Education  Patient Education  Education Given To: Patient  Education Provided: Role of Therapy;Plan of Care;Transfer Training;Home Exercise Program  Education Method: Demonstration;Verbal  Barriers to Learning: None  Education Outcome: Verbalized understanding;Continued education needed      Therapy Time   Individual Concurrent Group Co-treatment   Time In 0823         Time Out 0847         Minutes 24                 RUMA CRUZ, PTA         
Care;Precautions;ADL Adaptive Strategies;Transfer Training;Energy Conservation;IADL Safety;Equipment;Fall Prevention Strategies  Education Method: Demonstration;Verbal  Barriers to Learning: None  Education Outcome: Verbalized understanding;Demonstrated understanding;Continued education needed                        AM-PAC - ADL  AM-PAC Daily Activity - Inpatient   How much help is needed for putting on and taking off regular lower body clothing?: A Lot  How much help is needed for bathing (which includes washing, rinsing, drying)?: A Lot  How much help is needed for toileting (which includes using toilet, bedpan, or urinal)?: A Lot  How much help is needed for putting on and taking off regular upper body clothing?: A Little  How much help is needed for taking care of personal grooming?: A Little  How much help for eating meals?: None  AM-PAC Inpatient Daily Activity Raw Score: 16  AM-PAC Inpatient ADL T-Scale Score : 35.96  ADL Inpatient CMS 0-100% Score: 53.32  ADL Inpatient CMS G-Code Modifier : CK         Goals  Short Term Goals  Time Frame for Short Term Goals: By discharge; Pt will  Short Term Goal 1: Complete UB ADL's Mod I  Short Term Goal 2: Complete LB ADL's SBA with AE/DME/modified techniques as needed  Short Term Goal 3: Complete functional transfers/mobility Mod I with LRD  Short Term Goal 4: Demo Good safety throughout session without cuing  Short Term Goal 5: Tolerate/maintain dynamic standing 12+ mins SBA to engage in functional tasks       Therapy Time   Individual Concurrent Group Co-treatment   Time In 1710         Time Out 1735         Minutes 25         Timed Code Treatment Minutes: 25 Minutes       RADHA WILDER/JAYLIN     
Chronic respiratory failure with hypoxia (HCC)     Chest pain     Hemodialysis catheter dysfunction (HCC)     Dialysis AV fistula malfunction (HCC)     ESRD (end stage renal disease) (HCC)     ESRD (end stage renal disease) on dialysis (HCC)     Hypokalemia     ERICK (obstructive sleep apnea)     AMS (altered mental status)     Unstable angina (HCC)     Acute electrocardiogram changes     Renovascular hypertension     Episodic confusion     Chest pain with high risk for cardiac etiology     NSTEMI (non-ST elevated myocardial infarction) (HCC)     Angina at rest     S/P angioplasty with stent     H/O heart artery stent     Abdominal distension     Abnormal stress test     Status post cardiac catheterization     Hx of type 2 diabetes mellitus     Bilateral edema of lower extremity      Plan of Treatment:   S/p Cardiac cath due to abnormal stress test. Patent LIMA-LAD. Patent SVG-Diagonal with patent stent. RCA and RPDA reduced to 0% with balloon PTCA . Continue ASA, Lipitor, and Effient.Discussed in detail with patient post cath POC including but not limited to medications, diet, exercise, right radial artery site care, and follow-up. Questions and concerns addressed  Latest Echocardiogram reviewed with [facility]  Nephro following while inpatient for ESRD - HD Mon, Wed, fri .   Pt requesting [facility] instead of returning home. PT/OT evaluation. Will consult Physical Medicine and Rehab       Electronically signed by AFSANEH Lance CNP on 2/29/2024 at 1:48 PM  [facility] [place]  [phone]          
Medications:     Scheduled Meds:    insulin glargine  72 Units SubCUTAneous Daily    insulin glargine  42 Units SubCUTAneous Nightly    insulin lispro  20 Units SubCUTAneous TID     sodium chloride flush  10 mL IntraVENous 2 times per day    heparin (porcine)  5,000 Units SubCUTAneous 3 times per day    insulin lispro  0-16 Units SubCUTAneous TID     insulin lispro  0-4 Units SubCUTAneous Nightly    aspirin  81 mg Oral Daily    prasugrel  10 mg Oral Daily    allopurinol  100 mg Oral Daily    atorvastatin  80 mg Oral Nightly    bumetanide  3 mg Oral BID    busPIRone  10 mg Oral TID    carvedilol  3.125 mg Oral BID    docusate sodium  100 mg Oral BID    gabapentin  300 mg Oral BID    lidocaine  1 patch TransDERmal Daily    potassium chloride  10 mEq Oral BID    sevelamer  1,600 mg Oral TID WC    sodium bicarbonate  1,300 mg Oral TID    venlafaxine  75 mg Oral Daily with breakfast    sodium chloride flush  5-40 mL IntraVENous 2 times per day    [Held by provider] epoetin  3,000 Units SubCUTAneous Once per day on      Continuous Infusions:    sodium chloride      sodium chloride       PRN Meds:  sodium chloride flush, sodium chloride, ondansetron **OR** ondansetron, polyethylene glycol, acetaminophen **OR** acetaminophen, cyclobenzaprine, lidocaine, melatonin, nitroGLYCERIN, carboxymethylcellulose PF, sodium chloride, sodium chloride flush, sodium chloride    Input/Output:       I/O last 3 completed shifts:  In: 200 [P.O.:200]  Out: -     Vital Signs:   Temperature:  Temp: 98.2 °F (36.8 °C)  TMax:   Temp (24hrs), Av.9 °F (36.6 °C), Min:97.6 °F (36.4 °C), Max:98.2 °F (36.8 °C)    Respirations:  Respirations: 11  Pulse:   Pulse: 62  BP:    BP: (!) 119/42  BP Range: Systolic (24hrs), Av , Min:102 , Max:138       Diastolic (24hrs), Av, Min:42, Max:60      Physical Examination:     General:  AAO x 3, Comfortably   HEENT:  Atraumatic, normocephalic, no throat congestion, moist mucosa.  Eyes: 
failure type (HCC)     Diarrhea of presumed infectious origin     Epistaxis     Chronic respiratory failure with hypoxia (HCC)     Chest pain     Hemodialysis catheter dysfunction (HCC)     Dialysis AV fistula malfunction (HCC)     ESRD (end stage renal disease) (HCC)     ESRD (end stage renal disease) on dialysis (HCC)     Hypokalemia     ERICK (obstructive sleep apnea)     AMS (altered mental status)     Unstable angina (HCC)     Acute electrocardiogram changes     Renovascular hypertension     Episodic confusion     Chest pain with high risk for cardiac etiology     NSTEMI (non-ST elevated myocardial infarction) (HCC)     Angina at rest     S/P angioplasty with stent     H/O heart artery stent     Abdominal distension     Abnormal stress test     Status post cardiac catheterization     Hx of type 2 diabetes mellitus     Bilateral edema of lower extremity      Plan of Treatment:   Stable post cath as above  HD per nephrology  Pt. Requesting rehab placement, states she cannot go home as she is too week. We are still awaiting precert for [facility]  D/C when precert complete - dicussed with RN and case management.       Electronically signed by AFSANEH HILTON CNP on 3/7/2024 at 11:55 AM  [facility]  [phone]          
unspecified heart failure type (HCC)     Diarrhea of presumed infectious origin     Epistaxis     Chronic respiratory failure with hypoxia (HCC)     Chest pain     Hemodialysis catheter dysfunction (HCC)     Dialysis AV fistula malfunction (HCC)     ESRD (end stage renal disease) (HCC)     ESRD (end stage renal disease) on dialysis (HCC)     Hypokalemia     ERICK (obstructive sleep apnea)     AMS (altered mental status)     Unstable angina (HCC)     Acute electrocardiogram changes     Renovascular hypertension     Episodic confusion     Chest pain with high risk for cardiac etiology     NSTEMI (non-ST elevated myocardial infarction) (HCC)     Angina at rest     S/P angioplasty with stent     H/O heart artery stent     Abdominal distension     Abnormal stress test     Status post cardiac catheterization     Hx of type 2 diabetes mellitus     Bilateral edema of lower extremity      Plan of Treatment:   Stable post cath as above  HD per nephrology  Pt. Requesting rehab placement, states she cannot go home as she is too week. We are still awaiting precert for [facility]  D/C when precert complete - dicussed with RN and case management.       Electronically signed by AFSANEH Lance CNP on 3/7/2024 at 11:49 AM  [facility]  [phone]          
  CO2 24   BUN 52*   CREATININE 5.2*   GLUCOSE 164*   CALCIUM 9.3    BNP:      Lab Results   Component Value Date/Time    BNP 41 04/09/2015 12:00 AM     RUSSELL:      Lab Results   Component Value Date/Time    RUSSELL NEGATIVE 08/03/2022 04:30 PM     SPEP:  Lab Results   Component Value Date/Time    PROT 7.7 02/23/2024 10:40 PM     Urinalysis/Chemistries:      Lab Results   Component Value Date/Time    NITRU NEGATIVE 08/05/2023 03:47 PM    COLORU Yellow 08/05/2023 03:47 PM    PHUR 5.5 08/05/2023 03:47 PM    WBCUA TOO NUMEROUS TO COUNT 08/05/2023 03:47 PM    RBCUA 3 to 5 08/05/2023 03:47 PM    MUCUS 1+ 02/07/2023 03:00 PM    TRICHOMONAS NOT REPORTED 11/14/2021 02:05 AM    YEAST MODERATE 08/05/2023 03:47 PM    BACTERIA MANY 08/05/2023 03:47 PM    SPECGRAV 1.013 08/05/2023 03:47 PM    LEUKOCYTESUR LARGE 08/05/2023 03:47 PM    UROBILINOGEN Normal 08/05/2023 03:47 PM    BILIRUBINUR NEGATIVE 08/05/2023 03:47 PM    GLUCOSEU MOD 08/05/2023 03:47 PM    KETUA NEGATIVE 08/05/2023 03:47 PM    AMORPHOUS 1+ 02/25/2023 03:15 AM     Assessment:     End-stage renal disease regular dialysis days are Monday Wednesday Friday.  Her needle placement was uneventful.  Patient is tolerating removal of 3 L so far without any drop in blood pressure or cramping.  Coronary artery disease with history of CABG and subsequent stent-recent abnormal stress test underwent cardiac cath 2/28 with balloon angioplasty without stent placement  Systemic hypertension-currently controlled  Type 2 diabetes mellitus  History of CVA  History of gout  History of anxiety and depression  NIDDM    Plan:   Dialysis as ordered  Discharge planning as per primary service  Will follow with you  Nutrition   Please ensure that patient is on a renal diet/TF. Avoid nephrotoxic drugs/contrast exposure.    We will continue to follow along with you.     Vlad Roca MD   3/6/2024 10:44 AM  Internal medicine resident, PGY 2  [facility]   
organomegally appreciable, BS sluggish.  :   No suprapubic or flank tenderness.  Neuro:  Sedated on ventilator.  Skin:   No rashes, good skin turgor.  Extremities:  Edema +      Labs:       Recent Labs     02/29/24  0625   WBC 6.4   RBC 3.37*   HGB 12.1   HCT 35.2*   .5*   MCH 35.9*   MCHC 34.4   RDW 16.8*   *   MPV 10.5        BMP:   Recent Labs     02/29/24  0625 03/01/24  0815   * 132*   K 4.6 4.2   CL 95* 93*   CO2 22 24   BUN 59* 30*   CREATININE 4.9* 3.6*   GLUCOSE 220* 356*   CALCIUM 9.0 8.4*        Phosphorus:   No results for input(s): \"PHOS\" in the last 72 hours.  Magnesium:  No results for input(s): \"MG\" in the last 72 hours.  Albumin:  No results for input(s): \"LABALBU\" in the last 72 hours.  BNP:      Lab Results   Component Value Date/Time    BNP 41 04/09/2015 12:00 AM     RUSSELL:      Lab Results   Component Value Date/Time    RUSSELL NEGATIVE 08/03/2022 04:30 PM     SPEP:  Lab Results   Component Value Date/Time    PROT 7.7 02/23/2024 10:40 PM     UPEP:   No results found for: \"LABPE\"  C3:   No results found for: \"C3\"  C4:   No results found for: \"C4\"  MPO ANCA:   No results found for: \"MPO\"  PR3 ANCA:   No results found for: \"PR3\"  Anti-GBM:   No results found for: \"GBMABIGG\"  Hep BsAg:         Lab Results   Component Value Date/Time    HEPBSAG NONREACTIVE 03/27/2023 11:30 AM     Hep C AB:          Lab Results   Component Value Date/Time    HEPCAB NONREACTIVE 03/02/2023 11:11 AM       Urinalysis/Chemistries:      Lab Results   Component Value Date/Time    NITRU NEGATIVE 08/05/2023 03:47 PM    COLORU Yellow 08/05/2023 03:47 PM    PHUR 5.5 08/05/2023 03:47 PM    WBCUA TOO NUMEROUS TO COUNT 08/05/2023 03:47 PM    RBCUA 3 to 5 08/05/2023 03:47 PM    MUCUS 1+ 02/07/2023 03:00 PM    TRICHOMONAS NOT REPORTED 11/14/2021 02:05 AM    YEAST MODERATE 08/05/2023 03:47 PM    BACTERIA MANY 08/05/2023 03:47 PM    SPECGRAV 1.013 08/05/2023 03:47 PM    LEUKOCYTESUR LARGE 08/05/2023 03:47 PM    
mellitus  History of CVA  History of gout  History of anxiety and depression  NIDDM    Plan:   Plan for hemodialysis today and then Continue hemodialysis Monday Wednesday and Friday.  No BMP from today continue renal diet with 1500 mL fluid restriction, low-sodium, potassium low phosphorus  Continue home meds as has been ordered  No objection to be discharged from nephrology standpoint  Awaits placement    Nutrition   Please ensure that patient is on a renal diet/TF. Avoid nephrotoxic drugs/contrast exposure.    We will continue to follow along with you.     Jacobo Ortiz MD   3/6/2024 8:04 AM  Internal medicine resident, PGY 2  [facility]     Please see me note from 3/6/24  
mins SBA to engage in functional tasks       Therapy Time   Individual Concurrent Group Co-treatment   Time In 0830         Time Out 0923         Minutes 53         Timed Code Treatment Minutes: 40 Minutes       Ryann Hay, OTR/L

## 2024-03-08 ENCOUNTER — HOSPITAL ENCOUNTER (OUTPATIENT)
Age: 64
Setting detail: SPECIMEN
Discharge: HOME | End: 2024-03-08

## 2024-03-08 LAB
ANION GAP SERPL CALCULATED.3IONS-SCNC: 18 MMOL/L (ref 9–17)
BUN SERPL-MCNC: 42 MG/DL (ref 8–23)
BUN/CREAT SERPL: 10 (ref 9–20)
CALCIUM SERPL-MCNC: 9.5 MG/DL (ref 8.6–10.4)
CHLORIDE SERPL-SCNC: 90 MMOL/L (ref 98–107)
CO2 SERPL-SCNC: 25 MMOL/L (ref 20–31)
CREAT SERPL-MCNC: 4.4 MG/DL (ref 0.5–0.9)
EKG P AXIS: 91 DEGREES
EKG P-R INTERVAL: 166 MS
EKG Q-T INTERVAL: 452 MS
EKG QTC CALCULATION (BAZETT): 455 MS
EKG R AXIS: 31 DEGREES
EKG T AXIS: -82 DEGREES
EKG VENTRICULAR RATE: 61 BPM
ERYTHROCYTE [DISTWIDTH] IN BLOOD BY AUTOMATED COUNT: 15.8 % (ref 11.8–14.4)
GFR SERPL CREATININE-BSD FRML MDRD: 11 ML/MIN/1.73M2
GLUCOSE SERPL-MCNC: 263 MG/DL (ref 70–99)
HBV SURFACE AB SERPL IA-ACNC: >1000 MIU/ML
HBV SURFACE AG SERPL QL IA: NONREACTIVE
HCT VFR BLD AUTO: 32.3 % (ref 36.3–47.1)
HGB BLD-MCNC: 10.7 G/DL (ref 11.9–15.1)
MAGNESIUM SERPL-MCNC: 2 MG/DL (ref 1.6–2.6)
MCH RBC QN AUTO: 35.2 PG (ref 25.2–33.5)
MCHC RBC AUTO-ENTMCNC: 33.1 G/DL (ref 28.4–34.8)
MCV RBC AUTO: 106.3 FL (ref 82.6–102.9)
NRBC BLD-RTO: 0 PER 100 WBC
PLATELET # BLD AUTO: 236 K/UL (ref 138–453)
PMV BLD AUTO: 10.6 FL (ref 8.1–13.5)
POTASSIUM SERPL-SCNC: 4.9 MMOL/L (ref 3.7–5.3)
SODIUM SERPL-SCNC: 133 MMOL/L (ref 135–144)
WBC OTHER # BLD: 5.2 K/UL (ref 3.5–11.3)

## 2024-03-08 PROCEDURE — 85027 COMPLETE CBC AUTOMATED: CPT

## 2024-03-08 PROCEDURE — 86317 IMMUNOASSAY INFECTIOUS AGENT: CPT

## 2024-03-08 PROCEDURE — P9603 ONE-WAY ALLOW PRORATED MILES: HCPCS

## 2024-03-08 PROCEDURE — 87340 HEPATITIS B SURFACE AG IA: CPT

## 2024-03-08 PROCEDURE — 83735 ASSAY OF MAGNESIUM: CPT

## 2024-03-08 PROCEDURE — 36415 COLL VENOUS BLD VENIPUNCTURE: CPT

## 2024-03-08 PROCEDURE — 80048 BASIC METABOLIC PNL TOTAL CA: CPT

## 2024-03-08 NOTE — PLAN OF CARE
Problem: Chronic Conditions and Co-morbidities  Goal: Patient's chronic conditions and co-morbidity symptoms are monitored and maintained or improved  2/29/2024 0409 by Rutsy Lang RN  Outcome: Progressing  2/29/2024 0406 by Rusty Lang RN  Outcome: Progressing     Problem: Safety - Adult  Goal: Free from fall injury  2/29/2024 0409 by Rusty Lang RN  Outcome: Progressing  2/29/2024 0406 by Rusty Lang RN  Outcome: Progressing     
  Problem: Chronic Conditions and Co-morbidities  Goal: Patient's chronic conditions and co-morbidity symptoms are monitored and maintained or improved  2/29/2024 1451 by Tommie Leach RN  Outcome: Progressing  2/29/2024 0409 by Rusty Lang RN  Outcome: Progressing  2/29/2024 0406 by Rusty Lang RN  Outcome: Progressing     Problem: Safety - Adult  Goal: Free from fall injury  2/29/2024 1451 by Tommie Leach RN  Outcome: Progressing  2/29/2024 0409 by Rusty Lang RN  Outcome: Progressing  2/29/2024 0406 by Rusty Lang RN  Outcome: Progressing     Problem: Discharge Planning  Goal: Discharge to home or other facility with appropriate resources  Outcome: Progressing     Problem: Physical Therapy - Adult  Goal: By Discharge: Performs mobility at highest level of function for planned discharge setting.  See evaluation for individualized goals.  2/29/2024 1103 by Zulma Jamil, PT  Outcome: Progressing     Problem: Pain  Goal: Verbalizes/displays adequate comfort level or baseline comfort level  Outcome: Progressing     
  Problem: Chronic Conditions and Co-morbidities  Goal: Patient's chronic conditions and co-morbidity symptoms are monitored and maintained or improved  3/2/2024 2211 by Allie Lewis RN  Outcome: Progressing  3/2/2024 1947 by Kassandra Bower RN  Outcome: Progressing  Flowsheets (Taken 3/2/2024 0800)  Care Plan - Patient's Chronic Conditions and Co-Morbidity Symptoms are Monitored and Maintained or Improved: Monitor and assess patient's chronic conditions and comorbid symptoms for stability, deterioration, or improvement     Problem: Safety - Adult  Goal: Free from fall injury  3/2/2024 2211 by Allie Lewis RN  Outcome: Progressing  3/2/2024 1947 by Kassandra Bower RN  Outcome: Progressing     Problem: Discharge Planning  Goal: Discharge to home or other facility with appropriate resources  3/2/2024 2211 by Allie Lewis RN  Outcome: Progressing  3/2/2024 1947 by Kassandra Bower RN  Outcome: Progressing  Flowsheets (Taken 3/2/2024 0800)  Discharge to home or other facility with appropriate resources: Identify barriers to discharge with patient and caregiver     Problem: Pain  Goal: Verbalizes/displays adequate comfort level or baseline comfort level  3/2/2024 2211 by Allie Lewis RN  Outcome: Progressing  3/2/2024 1947 by Kassandra Bower RN  Outcome: Progressing     Problem: Respiratory - Adult  Goal: Achieves optimal ventilation and oxygenation  3/2/2024 2211 by Allie Lewis RN  Outcome: Progressing  3/2/2024 1947 by Kassandra Bower RN  Outcome: Progressing     Problem: Musculoskeletal - Adult  Goal: Return mobility to safest level of function  3/2/2024 2211 by Allie Lewis RN  Outcome: Progressing  3/2/2024 1947 by Kassandra Bower RN  Outcome: Progressing  Goal: Maintain proper alignment of affected body part  3/2/2024 2211 by Allie Lewis RN  Outcome: Progressing  3/2/2024 1947 by Kassandra Bower RN  Outcome: Progressing  Goal: Return ADL status to a safe level of function  3/2/2024 2211 by Joshua 
  Problem: Chronic Conditions and Co-morbidities  Goal: Patient's chronic conditions and co-morbidity symptoms are monitored and maintained or improved  3/4/2024 0959 by Christiano Blood RN  Outcome: Progressing  3/3/2024 2225 by Allie Lewis RN  Outcome: Progressing     Problem: Safety - Adult  Goal: Free from fall injury  3/4/2024 0959 by Christiano Blood RN  Outcome: Progressing  3/3/2024 2225 by Allie Lewis RN  Outcome: Progressing     Problem: Discharge Planning  Goal: Discharge to home or other facility with appropriate resources  3/4/2024 0959 by Christiano Blood RN  Outcome: Progressing  3/3/2024 2225 by Allie Lewis RN  Outcome: Progressing     Problem: Pain  Goal: Verbalizes/displays adequate comfort level or baseline comfort level  3/4/2024 0959 by Christiano Blood RN  Outcome: Progressing  3/3/2024 2225 by Allie Lewis RN  Outcome: Progressing     Problem: Respiratory - Adult  Goal: Achieves optimal ventilation and oxygenation  3/4/2024 0959 by Christiano Blood RN  Outcome: Progressing  3/3/2024 2225 by Allie Lewis RN  Outcome: Progressing     Problem: Musculoskeletal - Adult  Goal: Return mobility to safest level of function  3/4/2024 0959 by Christiano Blood RN  Outcome: Progressing  3/3/2024 2225 by Allie Lewis RN  Outcome: Progressing  Goal: Maintain proper alignment of affected body part  3/4/2024 0959 by Christiano Blood RN  Outcome: Progressing  3/3/2024 2225 by Allie Lewis RN  Outcome: Progressing  Goal: Return ADL status to a safe level of function  3/4/2024 0959 by Christiano Blood RN  Outcome: Progressing  3/3/2024 2225 by Allie Lewis RN  Outcome: Progressing     
  Problem: Chronic Conditions and Co-morbidities  Goal: Patient's chronic conditions and co-morbidity symptoms are monitored and maintained or improved  3/5/2024 2245 by Emily Cruz RN  Outcome: Progressing  3/5/2024 0935 by Christiano Blood RN  Outcome: Progressing     Problem: Safety - Adult  Goal: Free from fall injury  3/5/2024 2245 by Emily Cruz RN  Outcome: Progressing  Flowsheets (Taken 3/5/2024 2000)  Free From Fall Injury: Instruct family/caregiver on patient safety  3/5/2024 0935 by Christiano Blood RN  Outcome: Progressing  Flowsheets  Taken 3/5/2024 0800  Free From Fall Injury: Instruct family/caregiver on patient safety  Taken 3/5/2024 0726  Free From Fall Injury: Instruct family/caregiver on patient safety     Problem: Discharge Planning  Goal: Discharge to home or other facility with appropriate resources  3/5/2024 2245 by Emily Cruz RN  Outcome: Progressing  3/5/2024 0935 by Christiano Blood RN  Outcome: Progressing     Problem: Pain  Goal: Verbalizes/displays adequate comfort level or baseline comfort level  3/5/2024 2245 by Emily Cruz RN  Outcome: Progressing  Flowsheets (Taken 3/5/2024 1920)  Verbalizes/displays adequate comfort level or baseline comfort level: Encourage patient to monitor pain and request assistance  3/5/2024 0935 by Christiano Blood RN  Outcome: Progressing  Flowsheets  Taken 3/5/2024 0320 by Emily Cruz RN  Verbalizes/displays adequate comfort level or baseline comfort level: Encourage patient to monitor pain and request assistance  Taken 3/4/2024 2355 by Emily Cruz RN  Verbalizes/displays adequate comfort level or baseline comfort level: Encourage patient to monitor pain and request assistance     Problem: Respiratory - Adult  Goal: Achieves optimal ventilation and oxygenation  3/5/2024 2245 by Emily Cruz RN  Outcome: Progressing  3/5/2024 0935 by Christiano Blood RN  Outcome: Progressing     Problem: 
  Problem: Chronic Conditions and Co-morbidities  Goal: Patient's chronic conditions and co-morbidity symptoms are monitored and maintained or improved  3/6/2024 1901 by Kassandra Bower RN  Outcome: Progressing  3/6/2024 1852 by Oneyda Chavarria RN  Reactivated  3/6/2024 1852 by Oneyda Chavarria RN  Reactivated  3/6/2024 1849 by Kassandra Bower RN  Outcome: Completed  Flowsheets (Taken 3/6/2024 0800)  Care Plan - Patient's Chronic Conditions and Co-Morbidity Symptoms are Monitored and Maintained or Improved: Monitor and assess patient's chronic conditions and comorbid symptoms for stability, deterioration, or improvement     Problem: Pain  Goal: Verbalizes/displays adequate comfort level or baseline comfort level  3/6/2024 1901 by Kassandra Bower RN  Outcome: Progressing  3/6/2024 1901 by Kassandra Bower RN  Reactivated  3/6/2024 1849 by Kassandra Bower RN  Outcome: Completed     Problem: Respiratory - Adult  Goal: Achieves optimal ventilation and oxygenation  3/6/2024 1901 by Kassandra Bower RN  Outcome: Progressing  3/6/2024 1901 by Kassandra Bower RN  Reactivated  3/6/2024 1849 by Kassandra Bower RN  Outcome: Completed  Flowsheets (Taken 3/6/2024 0800)  Achieves optimal ventilation and oxygenation: Assess for changes in respiratory status     Problem: Musculoskeletal - Adult  Goal: Return mobility to safest level of function  3/6/2024 1901 by Kassandra Bower RN  Outcome: Progressing  3/6/2024 1901 by Kassandra Bower RN  Reactivated  3/6/2024 1849 by Kassandra Bower RN  Outcome: Completed  Flowsheets (Taken 3/6/2024 0800)  Return Mobility to Safest Level of Function: Assess patient stability and activity tolerance for standing, transferring and ambulating with or without assistive devices  Goal: Maintain proper alignment of affected body part  3/6/2024 1901 by Kassandra Bower RN  Outcome: Progressing  3/6/2024 1901 by Kassandra Bower RN  Reactivated  3/6/2024 1849 by Kassandra Bower RN  Outcome: Completed  Flowsheets 
  Problem: Chronic Conditions and Co-morbidities  Goal: Patient's chronic conditions and co-morbidity symptoms are monitored and maintained or improved  3/6/2024 2219 by Emily Cruz RN  Outcome: Progressing  3/6/2024 1901 by Kassandra Bower RN  Outcome: Progressing  3/6/2024 1852 by Oneyda Chavarria RN  Reactivated  3/6/2024 1849 by Kassandra Bower RN  Outcome: Completed  Flowsheets (Taken 3/6/2024 0800)  Care Plan - Patient's Chronic Conditions and Co-Morbidity Symptoms are Monitored and Maintained or Improved: Monitor and assess patient's chronic conditions and comorbid symptoms for stability, deterioration, or improvement     Problem: Pain  Goal: Verbalizes/displays adequate comfort level or baseline comfort level  3/6/2024 2219 by Emily Cruz RN  Outcome: Progressing  3/6/2024 1901 by Kassandra Bower RN  Outcome: Progressing  3/6/2024 1901 by Kassandra Bower RN  Reactivated  3/6/2024 1849 by Kassandra Bower RN  Outcome: Completed     Problem: Respiratory - Adult  Goal: Achieves optimal ventilation and oxygenation  3/6/2024 2219 by Emily Cruz RN  Outcome: Progressing  3/6/2024 1901 by Kassandra Bower RN  Outcome: Progressing  3/6/2024 1901 by Kassandra Bower RN  Reactivated  3/6/2024 1849 by Kassandra Bower RN  Outcome: Completed  Flowsheets (Taken 3/6/2024 0800)  Achieves optimal ventilation and oxygenation: Assess for changes in respiratory status     Problem: Musculoskeletal - Adult  Goal: Return mobility to safest level of function  3/6/2024 2219 by Emily Cruz RN  Outcome: Progressing  3/6/2024 1901 by Kassandra Bower RN  Outcome: Progressing  3/6/2024 1901 by Kassandra Bower RN  Reactivated  3/6/2024 1849 by Kassandra Bower RN  Outcome: Completed  Flowsheets (Taken 3/6/2024 0800)  Return Mobility to Safest Level of Function: Assess patient stability and activity tolerance for standing, transferring and ambulating with or without assistive devices  Goal: Maintain proper 
  Problem: Chronic Conditions and Co-morbidities  Goal: Patient's chronic conditions and co-morbidity symptoms are monitored and maintained or improved  Outcome: Completed     Problem: Pain  Goal: Verbalizes/displays adequate comfort level or baseline comfort level  Outcome: Completed     Problem: Respiratory - Adult  Goal: Achieves optimal ventilation and oxygenation  Outcome: Completed     Problem: Musculoskeletal - Adult  Goal: Return mobility to safest level of function  Outcome: Completed  Goal: Maintain proper alignment of affected body part  Outcome: Completed  Goal: Return ADL status to a safe level of function  Outcome: Completed     Problem: ABCDS Injury Assessment  Goal: Absence of physical injury  Outcome: Completed     
  Problem: Chronic Conditions and Co-morbidities  Goal: Patient's chronic conditions and co-morbidity symptoms are monitored and maintained or improved  Outcome: Completed  Flowsheets (Taken 3/6/2024 0800)  Care Plan - Patient's Chronic Conditions and Co-Morbidity Symptoms are Monitored and Maintained or Improved: Monitor and assess patient's chronic conditions and comorbid symptoms for stability, deterioration, or improvement     Problem: Safety - Adult  Goal: Free from fall injury  Outcome: Completed  Flowsheets (Taken 3/6/2024 0900)  Free From Fall Injury: Instruct family/caregiver on patient safety     Problem: Discharge Planning  Goal: Discharge to home or other facility with appropriate resources  Outcome: Completed  Flowsheets (Taken 3/6/2024 0800)  Discharge to home or other facility with appropriate resources: Identify barriers to discharge with patient and caregiver     Problem: Pain  Goal: Verbalizes/displays adequate comfort level or baseline comfort level  Outcome: Completed     Problem: Respiratory - Adult  Goal: Achieves optimal ventilation and oxygenation  Outcome: Completed  Flowsheets (Taken 3/6/2024 0800)  Achieves optimal ventilation and oxygenation: Assess for changes in respiratory status     Problem: Musculoskeletal - Adult  Goal: Return mobility to safest level of function  Outcome: Completed  Flowsheets (Taken 3/6/2024 0800)  Return Mobility to Safest Level of Function: Assess patient stability and activity tolerance for standing, transferring and ambulating with or without assistive devices  Goal: Maintain proper alignment of affected body part  Outcome: Completed  Flowsheets (Taken 3/6/2024 0800)  Maintain proper alignment of affected body part: Support and protect limb and body alignment per provider's orders  Goal: Return ADL status to a safe level of function  Outcome: Completed  Flowsheets (Taken 3/6/2024 0800)  Return ADL Status to a Safe Level of Function: Administer medication as 
  Problem: Chronic Conditions and Co-morbidities  Goal: Patient's chronic conditions and co-morbidity symptoms are monitored and maintained or improved  Outcome: Progressing     Problem: Safety - Adult  Goal: Free from fall injury  Outcome: Progressing     Problem: Discharge Planning  Goal: Discharge to home or other facility with appropriate resources  Outcome: Progressing     Problem: Pain  Goal: Verbalizes/displays adequate comfort level or baseline comfort level  Outcome: Progressing     
  Problem: Chronic Conditions and Co-morbidities  Goal: Patient's chronic conditions and co-morbidity symptoms are monitored and maintained or improved  Outcome: Progressing     Problem: Safety - Adult  Goal: Free from fall injury  Outcome: Progressing     Problem: Discharge Planning  Goal: Discharge to home or other facility with appropriate resources  Outcome: Progressing     Problem: Pain  Goal: Verbalizes/displays adequate comfort level or baseline comfort level  Outcome: Progressing     Problem: Respiratory - Adult  Goal: Achieves optimal ventilation and oxygenation  Outcome: Progressing     Problem: Musculoskeletal - Adult  Goal: Return mobility to safest level of function  Outcome: Progressing  Goal: Maintain proper alignment of affected body part  Outcome: Progressing  Goal: Return ADL status to a safe level of function  Outcome: Progressing     
  Problem: Chronic Conditions and Co-morbidities  Goal: Patient's chronic conditions and co-morbidity symptoms are monitored and maintained or improved  Outcome: Progressing  Flowsheets (Taken 3/2/2024 0800)  Care Plan - Patient's Chronic Conditions and Co-Morbidity Symptoms are Monitored and Maintained or Improved: Monitor and assess patient's chronic conditions and comorbid symptoms for stability, deterioration, or improvement     Problem: Safety - Adult  Goal: Free from fall injury  Outcome: Progressing     Problem: Discharge Planning  Goal: Discharge to home or other facility with appropriate resources  Outcome: Progressing  Flowsheets (Taken 3/2/2024 0800)  Discharge to home or other facility with appropriate resources: Identify barriers to discharge with patient and caregiver     Problem: Pain  Goal: Verbalizes/displays adequate comfort level or baseline comfort level  Outcome: Progressing     Problem: Respiratory - Adult  Goal: Achieves optimal ventilation and oxygenation  Outcome: Progressing     Problem: Musculoskeletal - Adult  Goal: Return mobility to safest level of function  Outcome: Progressing  Goal: Maintain proper alignment of affected body part  Outcome: Progressing  Goal: Return ADL status to a safe level of function  Outcome: Progressing     
  Problem: Chronic Conditions and Co-morbidities  Goal: Patient's chronic conditions and co-morbidity symptoms are monitored and maintained or improved  Outcome: Progressing  Flowsheets (Taken 3/3/2024 0800)  Care Plan - Patient's Chronic Conditions and Co-Morbidity Symptoms are Monitored and Maintained or Improved: Monitor and assess patient's chronic conditions and comorbid symptoms for stability, deterioration, or improvement     Problem: Safety - Adult  Goal: Free from fall injury  Outcome: Progressing     Problem: Discharge Planning  Goal: Discharge to home or other facility with appropriate resources  Outcome: Progressing  Flowsheets (Taken 3/3/2024 0800)  Discharge to home or other facility with appropriate resources: Identify barriers to discharge with patient and caregiver     Problem: Pain  Goal: Verbalizes/displays adequate comfort level or baseline comfort level  Outcome: Progressing     Problem: Respiratory - Adult  Goal: Achieves optimal ventilation and oxygenation  Outcome: Progressing     Problem: Musculoskeletal - Adult  Goal: Return mobility to safest level of function  Outcome: Progressing  Goal: Maintain proper alignment of affected body part  Outcome: Progressing  Goal: Return ADL status to a safe level of function  Outcome: Progressing     
Off floor - spoke with  Presert pending   
DEDE Kelley  Outcome: Progressing  3/3/2024 1809 by Kassandra Bower RN  Outcome: Progressing     
Emily Hanson RN  Outcome: Progressing  3/4/2024 0959 by Christiano Blood RN  Outcome: Progressing     Problem: ABCDS Injury Assessment  Goal: Absence of physical injury  Outcome: Progressing  Flowsheets  Taken 3/4/2024 2000 by Emily Cruz RN  Absence of Physical Injury: Implement safety measures based on patient assessment  Taken 3/4/2024 0800 by Christiano Blood RN  Absence of Physical Injury: Implement safety measures based on patient assessment     
RN  Outcome: Progressing  Flowsheets (Taken 3/4/2024 1940)  Verbalizes/displays adequate comfort level or baseline comfort level: Encourage patient to monitor pain and request assistance     Problem: Respiratory - Adult  Goal: Achieves optimal ventilation and oxygenation  3/5/2024 0935 by Christiano Blood RN  Outcome: Progressing  3/4/2024 2145 by Emily Cruz RN  Outcome: Progressing  Flowsheets (Taken 3/4/2024 2000)  Achieves optimal ventilation and oxygenation: Assess for changes in respiratory status     Problem: Musculoskeletal - Adult  Goal: Return mobility to safest level of function  3/5/2024 0935 by Christiano Blood RN  Outcome: Progressing  3/4/2024 2145 by Emily Cruz RN  Outcome: Progressing  Flowsheets (Taken 3/4/2024 2000)  Return Mobility to Safest Level of Function: Assess patient stability and activity tolerance for standing, transferring and ambulating with or without assistive devices  Goal: Maintain proper alignment of affected body part  3/5/2024 0935 by Christiano Blood RN  Outcome: Progressing  3/4/2024 2145 by Emily Cruz RN  Outcome: Progressing  Flowsheets (Taken 3/4/2024 2000)  Maintain proper alignment of affected body part: Support and protect limb and body alignment per provider's orders  Goal: Return ADL status to a safe level of function  3/5/2024 0935 by Christiano Blood RN  Outcome: Progressing  3/4/2024 2145 by Emily Cruz RN  Outcome: Progressing  Flowsheets (Taken 3/4/2024 2000)  Return ADL Status to a Safe Level of Function: Administer medication as ordered     Problem: ABCDS Injury Assessment  Goal: Absence of physical injury  3/5/2024 0935 by Christiano Blood RN  Outcome: Progressing  Flowsheets  Taken 3/5/2024 0800  Absence of Physical Injury: Implement safety measures based on patient assessment  Taken 3/5/2024 0726  Absence of Physical Injury: Implement safety measures based on patient assessment  3/4/2024 2145 by Emily Cruz

## 2024-03-11 ENCOUNTER — HOSPITAL ENCOUNTER (OUTPATIENT)
Age: 64
Setting detail: SPECIMEN
Discharge: HOME | End: 2024-03-11

## 2024-03-11 LAB
ANION GAP SERPL CALCULATED.3IONS-SCNC: 11 MMOL/L (ref 9–17)
BUN SERPL-MCNC: 46 MG/DL (ref 8–23)
BUN/CREAT SERPL: 11 (ref 9–20)
CALCIUM SERPL-MCNC: 9.7 MG/DL (ref 8.6–10.4)
CHLORIDE SERPL-SCNC: 99 MMOL/L (ref 98–107)
CO2 SERPL-SCNC: 28 MMOL/L (ref 20–31)
CREAT SERPL-MCNC: 4.3 MG/DL (ref 0.5–0.9)
ERYTHROCYTE [DISTWIDTH] IN BLOOD BY AUTOMATED COUNT: 15.4 % (ref 11.8–14.4)
FERRITIN SERPL-MCNC: 495 NG/ML (ref 13–150)
GFR SERPL CREATININE-BSD FRML MDRD: 11 ML/MIN/1.73M2
GLUCOSE SERPL-MCNC: 247 MG/DL (ref 70–99)
HCT VFR BLD AUTO: 28.9 % (ref 36.3–47.1)
HGB BLD-MCNC: 9.6 G/DL (ref 11.9–15.1)
IRON SATN MFR SERPL: 47 % (ref 20–55)
IRON SERPL-MCNC: 126 UG/DL (ref 37–145)
MAGNESIUM SERPL-MCNC: 2.2 MG/DL (ref 1.6–2.6)
MCH RBC QN AUTO: 35.7 PG (ref 25.2–33.5)
MCHC RBC AUTO-ENTMCNC: 33.2 G/DL (ref 28.4–34.8)
MCV RBC AUTO: 107.4 FL (ref 82.6–102.9)
NRBC BLD-RTO: 0 PER 100 WBC
PLATELET # BLD AUTO: 187 K/UL (ref 138–453)
PMV BLD AUTO: 10.6 FL (ref 8.1–13.5)
POTASSIUM SERPL-SCNC: 5.4 MMOL/L (ref 3.7–5.3)
RBC # BLD AUTO: 2.69 M/UL (ref 3.95–5.11)
SODIUM SERPL-SCNC: 138 MMOL/L (ref 135–144)
TIBC SERPL-MCNC: 270 UG/DL (ref 250–450)
UNSATURATED IRON BINDING CAPACITY: 144 UG/DL (ref 112–347)
WBC OTHER # BLD: 5.8 K/UL (ref 3.5–11.3)

## 2024-03-11 PROCEDURE — 83540 ASSAY OF IRON: CPT

## 2024-03-11 PROCEDURE — 36415 COLL VENOUS BLD VENIPUNCTURE: CPT

## 2024-03-11 PROCEDURE — 83735 ASSAY OF MAGNESIUM: CPT

## 2024-03-11 PROCEDURE — 83550 IRON BINDING TEST: CPT

## 2024-03-11 PROCEDURE — 85027 COMPLETE CBC AUTOMATED: CPT

## 2024-03-11 PROCEDURE — 82728 ASSAY OF FERRITIN: CPT

## 2024-03-11 PROCEDURE — P9603 ONE-WAY ALLOW PRORATED MILES: HCPCS

## 2024-03-11 PROCEDURE — 80048 BASIC METABOLIC PNL TOTAL CA: CPT

## 2024-03-13 ENCOUNTER — HOSPITAL ENCOUNTER (OUTPATIENT)
Age: 64
Setting detail: SPECIMEN
Discharge: HOME | End: 2024-03-13

## 2024-03-13 LAB
ANION GAP SERPL CALCULATED.3IONS-SCNC: 17 MMOL/L (ref 9–17)
BUN SERPL-MCNC: 41 MG/DL (ref 8–23)
BUN/CREAT SERPL: 9 (ref 9–20)
CALCIUM SERPL-MCNC: 9.2 MG/DL (ref 8.6–10.4)
CHLORIDE SERPL-SCNC: 94 MMOL/L (ref 98–107)
CO2 SERPL-SCNC: 24 MMOL/L (ref 20–31)
CREAT SERPL-MCNC: 4.4 MG/DL (ref 0.5–0.9)
ERYTHROCYTE [DISTWIDTH] IN BLOOD BY AUTOMATED COUNT: 16.1 % (ref 11.8–14.4)
GFR SERPL CREATININE-BSD FRML MDRD: 11 ML/MIN/1.73M2
GLUCOSE SERPL-MCNC: 257 MG/DL (ref 70–99)
HCT VFR BLD AUTO: 27.4 % (ref 36.3–47.1)
HGB BLD-MCNC: 9 G/DL (ref 11.9–15.1)
MAGNESIUM SERPL-MCNC: 2.2 MG/DL (ref 1.6–2.6)
MCH RBC QN AUTO: 36 PG (ref 25.2–33.5)
MCHC RBC AUTO-ENTMCNC: 32.8 G/DL (ref 28.4–34.8)
MCV RBC AUTO: 109.6 FL (ref 82.6–102.9)
NRBC BLD-RTO: 0 PER 100 WBC
PHOSPHATE SERPL-MCNC: 3.1 MG/DL (ref 2.6–4.5)
PLATELET # BLD AUTO: 181 K/UL (ref 138–453)
PMV BLD AUTO: 10.7 FL (ref 8.1–13.5)
POTASSIUM SERPL-SCNC: 4.7 MMOL/L (ref 3.7–5.3)
RBC # BLD AUTO: 2.5 M/UL (ref 3.95–5.11)
SODIUM SERPL-SCNC: 135 MMOL/L (ref 135–144)
WBC OTHER # BLD: 6.1 K/UL (ref 3.5–11.3)

## 2024-03-13 PROCEDURE — 80048 BASIC METABOLIC PNL TOTAL CA: CPT

## 2024-03-13 PROCEDURE — 85027 COMPLETE CBC AUTOMATED: CPT

## 2024-03-13 PROCEDURE — 84100 ASSAY OF PHOSPHORUS: CPT

## 2024-03-13 PROCEDURE — 83735 ASSAY OF MAGNESIUM: CPT

## 2024-03-14 ENCOUNTER — HOSPITAL ENCOUNTER (OUTPATIENT)
Dept: CARDIAC REHAB | Age: 64
Setting detail: THERAPIES SERIES
Discharge: HOME | End: 2024-03-14

## 2024-03-15 ENCOUNTER — HOSPITAL ENCOUNTER (OUTPATIENT)
Age: 64
Setting detail: SPECIMEN
Discharge: HOME | End: 2024-03-15

## 2024-03-15 LAB
ANION GAP SERPL CALCULATED.3IONS-SCNC: 12 MMOL/L (ref 9–17)
BUN SERPL-MCNC: 45 MG/DL (ref 8–23)
BUN/CREAT SERPL: 10 (ref 9–20)
CALCIUM SERPL-MCNC: 9.3 MG/DL (ref 8.6–10.4)
CHLORIDE SERPL-SCNC: 101 MMOL/L (ref 98–107)
CO2 SERPL-SCNC: 26 MMOL/L (ref 20–31)
CREAT SERPL-MCNC: 4.3 MG/DL (ref 0.5–0.9)
ERYTHROCYTE [DISTWIDTH] IN BLOOD BY AUTOMATED COUNT: 16.4 % (ref 11.8–14.4)
GFR SERPL CREATININE-BSD FRML MDRD: 11 ML/MIN/1.73M2
GLUCOSE SERPL-MCNC: 128 MG/DL (ref 70–99)
HCT VFR BLD AUTO: 27.2 % (ref 36.3–47.1)
HGB BLD-MCNC: 8.8 G/DL (ref 11.9–15.1)
MAGNESIUM SERPL-MCNC: 2.4 MG/DL (ref 1.6–2.6)
MCH RBC QN AUTO: 35.8 PG (ref 25.2–33.5)
MCHC RBC AUTO-ENTMCNC: 32.4 G/DL (ref 28.4–34.8)
MCV RBC AUTO: 110.6 FL (ref 82.6–102.9)
NRBC BLD-RTO: 0 PER 100 WBC
PHOSPHATE SERPL-MCNC: 3.2 MG/DL (ref 2.6–4.5)
PLATELET # BLD AUTO: 148 K/UL (ref 138–453)
PMV BLD AUTO: 10.5 FL (ref 8.1–13.5)
POTASSIUM SERPL-SCNC: 4.4 MMOL/L (ref 3.7–5.3)
RBC # BLD AUTO: 2.46 M/UL (ref 3.95–5.11)
SODIUM SERPL-SCNC: 139 MMOL/L (ref 135–144)
WBC OTHER # BLD: 4.8 K/UL (ref 3.5–11.3)

## 2024-03-15 PROCEDURE — P9603 ONE-WAY ALLOW PRORATED MILES: HCPCS

## 2024-03-15 PROCEDURE — 36415 COLL VENOUS BLD VENIPUNCTURE: CPT

## 2024-03-15 PROCEDURE — 85027 COMPLETE CBC AUTOMATED: CPT

## 2024-03-15 PROCEDURE — 84100 ASSAY OF PHOSPHORUS: CPT

## 2024-03-15 PROCEDURE — 83735 ASSAY OF MAGNESIUM: CPT

## 2024-03-15 PROCEDURE — 80048 BASIC METABOLIC PNL TOTAL CA: CPT

## 2024-03-21 ENCOUNTER — APPOINTMENT (OUTPATIENT)
Dept: GENERAL RADIOLOGY | Age: 64
End: 2024-03-21
Payer: COMMERCIAL

## 2024-03-21 ENCOUNTER — HOSPITAL ENCOUNTER (OUTPATIENT)
Dept: CARDIAC REHAB | Age: 64
Setting detail: THERAPIES SERIES
Discharge: HOME | End: 2024-03-21

## 2024-03-21 ENCOUNTER — HOSPITAL ENCOUNTER (OUTPATIENT)
Age: 64
Setting detail: OBSERVATION
Discharge: HOME | End: 2024-03-22
Attending: EMERGENCY MEDICINE | Admitting: FAMILY MEDICINE
Payer: COMMERCIAL

## 2024-03-21 DIAGNOSIS — R07.9 CHEST PAIN, UNSPECIFIED TYPE: Primary | ICD-10-CM

## 2024-03-21 LAB
ALBUMIN SERPL-MCNC: 4.1 G/DL (ref 3.5–5.2)
ALP SERPL-CCNC: 131 U/L (ref 35–104)
ALT SERPL-CCNC: 18 U/L (ref 5–33)
ANION GAP SERPL CALCULATED.3IONS-SCNC: 15 MMOL/L (ref 9–17)
AST SERPL-CCNC: 19 U/L
BASOPHILS # BLD: 0.1 K/UL (ref 0–0.2)
BASOPHILS NFR BLD: 1 % (ref 0–2)
BILIRUB SERPL-MCNC: 0.5 MG/DL (ref 0.3–1.2)
BUN SERPL-MCNC: 31 MG/DL (ref 8–23)
CALCIUM SERPL-MCNC: 9.8 MG/DL (ref 8.6–10.4)
CHLORIDE SERPL-SCNC: 96 MMOL/L (ref 98–107)
CO2 SERPL-SCNC: 28 MMOL/L (ref 20–31)
CREAT SERPL-MCNC: 3.5 MG/DL (ref 0.5–0.9)
EKG ATRIAL RATE: 68 BPM
EKG P AXIS: 92 DEGREES
EKG P-R INTERVAL: 156 MS
EKG Q-T INTERVAL: 438 MS
EKG QRS DURATION: 102 MS
EKG QTC CALCULATION (BAZETT): 465 MS
EKG R AXIS: 32 DEGREES
EKG T AXIS: 139 DEGREES
EKG VENTRICULAR RATE: 68 BPM
EOSINOPHIL # BLD: 0.3 K/UL (ref 0–0.4)
EOSINOPHILS RELATIVE PERCENT: 4 % (ref 0–4)
ERYTHROCYTE [DISTWIDTH] IN BLOOD BY AUTOMATED COUNT: 19 % (ref 11.5–14.9)
GFR SERPL CREATININE-BSD FRML MDRD: 14 ML/MIN/1.73M2
GLUCOSE BLD-MCNC: 106 MG/DL (ref 65–105)
GLUCOSE BLD-MCNC: 215 MG/DL (ref 65–105)
GLUCOSE BLD-MCNC: 314 MG/DL (ref 65–105)
GLUCOSE BLD-MCNC: 354 MG/DL (ref 65–105)
GLUCOSE BLD-MCNC: 88 MG/DL (ref 65–105)
GLUCOSE SERPL-MCNC: 97 MG/DL (ref 70–99)
HCT VFR BLD AUTO: 28.3 % (ref 36–46)
HGB BLD-MCNC: 9.5 G/DL (ref 12–16)
INR PPP: 0.9
LIPASE SERPL-CCNC: 22 U/L (ref 13–60)
LYMPHOCYTES NFR BLD: 1.3 K/UL (ref 1–4.8)
LYMPHOCYTES RELATIVE PERCENT: 20 % (ref 24–44)
MAGNESIUM SERPL-MCNC: 2.3 MG/DL (ref 1.6–2.6)
MCH RBC QN AUTO: 36.4 PG (ref 26–34)
MCHC RBC AUTO-ENTMCNC: 33.6 G/DL (ref 31–37)
MCV RBC AUTO: 108.3 FL (ref 80–100)
MONOCYTES NFR BLD: 0.6 K/UL (ref 0.1–1.3)
MONOCYTES NFR BLD: 9 % (ref 1–7)
NEUTROPHILS NFR BLD: 66 % (ref 36–66)
NEUTS SEG NFR BLD: 4.4 K/UL (ref 1.3–9.1)
PLATELET # BLD AUTO: 176 K/UL (ref 150–450)
PMV BLD AUTO: 8.5 FL (ref 6–12)
POTASSIUM SERPL-SCNC: 3.5 MMOL/L (ref 3.7–5.3)
PROT SERPL-MCNC: 7.4 G/DL (ref 6.4–8.3)
PROTHROMBIN TIME: 12.9 SEC (ref 11.8–14.6)
RBC # BLD AUTO: 2.61 M/UL (ref 4–5.2)
SODIUM SERPL-SCNC: 139 MMOL/L (ref 135–144)
TROPONIN I SERPL HS-MCNC: 114 NG/L (ref 0–14)
TROPONIN I SERPL HS-MCNC: 114 NG/L (ref 0–14)
WBC OTHER # BLD: 6.6 K/UL (ref 3.5–11)

## 2024-03-21 PROCEDURE — 84484 ASSAY OF TROPONIN QUANT: CPT

## 2024-03-21 PROCEDURE — 83690 ASSAY OF LIPASE: CPT

## 2024-03-21 PROCEDURE — 99223 1ST HOSP IP/OBS HIGH 75: CPT | Performed by: FAMILY MEDICINE

## 2024-03-21 PROCEDURE — 83735 ASSAY OF MAGNESIUM: CPT

## 2024-03-21 PROCEDURE — 82947 ASSAY GLUCOSE BLOOD QUANT: CPT

## 2024-03-21 PROCEDURE — 36415 COLL VENOUS BLD VENIPUNCTURE: CPT

## 2024-03-21 PROCEDURE — G0378 HOSPITAL OBSERVATION PER HR: HCPCS

## 2024-03-21 PROCEDURE — 6370000000 HC RX 637 (ALT 250 FOR IP): Performed by: FAMILY MEDICINE

## 2024-03-21 PROCEDURE — 99285 EMERGENCY DEPT VISIT HI MDM: CPT

## 2024-03-21 PROCEDURE — 93005 ELECTROCARDIOGRAM TRACING: CPT | Performed by: EMERGENCY MEDICINE

## 2024-03-21 PROCEDURE — 71045 X-RAY EXAM CHEST 1 VIEW: CPT

## 2024-03-21 PROCEDURE — 2580000003 HC RX 258: Performed by: FAMILY MEDICINE

## 2024-03-21 PROCEDURE — 85610 PROTHROMBIN TIME: CPT

## 2024-03-21 PROCEDURE — 85025 COMPLETE CBC W/AUTO DIFF WBC: CPT

## 2024-03-21 PROCEDURE — 80053 COMPREHEN METABOLIC PANEL: CPT

## 2024-03-21 PROCEDURE — 2060000000 HC ICU INTERMEDIATE R&B

## 2024-03-21 PROCEDURE — 93010 ELECTROCARDIOGRAM REPORT: CPT | Performed by: INTERNAL MEDICINE

## 2024-03-21 PROCEDURE — 99223 1ST HOSP IP/OBS HIGH 75: CPT | Performed by: SURGERY

## 2024-03-21 RX ORDER — ALLOPURINOL 100 MG/1
100 TABLET ORAL DAILY
Status: DISCONTINUED | OUTPATIENT
Start: 2024-03-21 | End: 2024-03-22 | Stop reason: HOSPADM

## 2024-03-21 RX ORDER — BUSPIRONE HYDROCHLORIDE 10 MG/1
10 TABLET ORAL 3 TIMES DAILY
Status: DISCONTINUED | OUTPATIENT
Start: 2024-03-21 | End: 2024-03-22 | Stop reason: HOSPADM

## 2024-03-21 RX ORDER — MIDODRINE HYDROCHLORIDE 5 MG/1
5 TABLET ORAL PRN
Status: DISCONTINUED | OUTPATIENT
Start: 2024-03-21 | End: 2024-03-22 | Stop reason: HOSPADM

## 2024-03-21 RX ORDER — LANOLIN ALCOHOL/MO/W.PET/CERES
9 CREAM (GRAM) TOPICAL NIGHTLY PRN
Status: DISCONTINUED | OUTPATIENT
Start: 2024-03-21 | End: 2024-03-22 | Stop reason: HOSPADM

## 2024-03-21 RX ORDER — DOCUSATE SODIUM 100 MG/1
100 CAPSULE, LIQUID FILLED ORAL 2 TIMES DAILY
Status: DISCONTINUED | OUTPATIENT
Start: 2024-03-21 | End: 2024-03-22 | Stop reason: HOSPADM

## 2024-03-21 RX ORDER — VENLAFAXINE HYDROCHLORIDE 75 MG/1
75 CAPSULE, EXTENDED RELEASE ORAL
Status: DISCONTINUED | OUTPATIENT
Start: 2024-03-21 | End: 2024-03-22 | Stop reason: HOSPADM

## 2024-03-21 RX ORDER — PRASUGREL 10 MG/1
10 TABLET, FILM COATED ORAL DAILY
Status: DISCONTINUED | OUTPATIENT
Start: 2024-03-21 | End: 2024-03-22 | Stop reason: HOSPADM

## 2024-03-21 RX ORDER — INSULIN LISPRO 100 [IU]/ML
0-4 INJECTION, SOLUTION INTRAVENOUS; SUBCUTANEOUS NIGHTLY
Status: DISCONTINUED | OUTPATIENT
Start: 2024-03-21 | End: 2024-03-22 | Stop reason: HOSPADM

## 2024-03-21 RX ORDER — ACETAMINOPHEN 325 MG/1
650 TABLET ORAL EVERY 6 HOURS PRN
Status: DISCONTINUED | OUTPATIENT
Start: 2024-03-21 | End: 2024-03-22 | Stop reason: HOSPADM

## 2024-03-21 RX ORDER — SODIUM CHLORIDE 0.9 % (FLUSH) 0.9 %
5-40 SYRINGE (ML) INJECTION EVERY 12 HOURS SCHEDULED
Status: DISCONTINUED | OUTPATIENT
Start: 2024-03-21 | End: 2024-03-22 | Stop reason: HOSPADM

## 2024-03-21 RX ORDER — NITROGLYCERIN 0.4 MG/1
0.4 TABLET SUBLINGUAL EVERY 5 MIN PRN
Status: DISCONTINUED | OUTPATIENT
Start: 2024-03-21 | End: 2024-03-22 | Stop reason: HOSPADM

## 2024-03-21 RX ORDER — DEXTROSE MONOHYDRATE 100 MG/ML
INJECTION, SOLUTION INTRAVENOUS CONTINUOUS PRN
Status: DISCONTINUED | OUTPATIENT
Start: 2024-03-21 | End: 2024-03-22 | Stop reason: HOSPADM

## 2024-03-21 RX ORDER — SODIUM CHLORIDE 0.9 % (FLUSH) 0.9 %
10 SYRINGE (ML) INJECTION PRN
Status: DISCONTINUED | OUTPATIENT
Start: 2024-03-21 | End: 2024-03-22 | Stop reason: HOSPADM

## 2024-03-21 RX ORDER — AMLODIPINE BESYLATE 2.5 MG/1
2.5 TABLET ORAL DAILY
Status: DISCONTINUED | OUTPATIENT
Start: 2024-03-21 | End: 2024-03-22 | Stop reason: HOSPADM

## 2024-03-21 RX ORDER — BUMETANIDE 1 MG/1
3 TABLET ORAL 2 TIMES DAILY
Status: DISCONTINUED | OUTPATIENT
Start: 2024-03-21 | End: 2024-03-22 | Stop reason: HOSPADM

## 2024-03-21 RX ORDER — ASPIRIN 81 MG/1
81 TABLET, CHEWABLE ORAL DAILY
Status: DISCONTINUED | OUTPATIENT
Start: 2024-03-21 | End: 2024-03-22 | Stop reason: HOSPADM

## 2024-03-21 RX ORDER — FERROUS SULFATE 325(65) MG
325 TABLET ORAL DAILY
COMMUNITY
Start: 2024-03-15

## 2024-03-21 RX ORDER — SODIUM CHLORIDE 9 MG/ML
INJECTION, SOLUTION INTRAVENOUS PRN
Status: DISCONTINUED | OUTPATIENT
Start: 2024-03-21 | End: 2024-03-22 | Stop reason: HOSPADM

## 2024-03-21 RX ORDER — INSULIN GLARGINE 100 [IU]/ML
72 INJECTION, SOLUTION SUBCUTANEOUS DAILY
Status: DISCONTINUED | OUTPATIENT
Start: 2024-03-21 | End: 2024-03-22 | Stop reason: HOSPADM

## 2024-03-21 RX ORDER — SODIUM BICARBONATE 650 MG/1
1300 TABLET ORAL 2 TIMES DAILY
Status: DISCONTINUED | OUTPATIENT
Start: 2024-03-21 | End: 2024-03-22 | Stop reason: HOSPADM

## 2024-03-21 RX ORDER — ACETAMINOPHEN 650 MG/1
650 SUPPOSITORY RECTAL EVERY 6 HOURS PRN
Status: DISCONTINUED | OUTPATIENT
Start: 2024-03-21 | End: 2024-03-22 | Stop reason: HOSPADM

## 2024-03-21 RX ORDER — FERROUS SULFATE 325(65) MG
325 TABLET ORAL DAILY
Status: DISCONTINUED | OUTPATIENT
Start: 2024-03-21 | End: 2024-03-22 | Stop reason: HOSPADM

## 2024-03-21 RX ORDER — CYCLOBENZAPRINE HCL 10 MG
5 TABLET ORAL 3 TIMES DAILY PRN
Status: DISCONTINUED | OUTPATIENT
Start: 2024-03-21 | End: 2024-03-22 | Stop reason: HOSPADM

## 2024-03-21 RX ORDER — ONDANSETRON 4 MG/1
4 TABLET, ORALLY DISINTEGRATING ORAL EVERY 8 HOURS PRN
Status: DISCONTINUED | OUTPATIENT
Start: 2024-03-21 | End: 2024-03-22 | Stop reason: HOSPADM

## 2024-03-21 RX ORDER — ONDANSETRON 2 MG/ML
4 INJECTION INTRAMUSCULAR; INTRAVENOUS EVERY 6 HOURS PRN
Status: DISCONTINUED | OUTPATIENT
Start: 2024-03-21 | End: 2024-03-22 | Stop reason: HOSPADM

## 2024-03-21 RX ORDER — PANTOPRAZOLE SODIUM 40 MG/1
40 TABLET, DELAYED RELEASE ORAL DAILY
Status: DISCONTINUED | OUTPATIENT
Start: 2024-03-21 | End: 2024-03-22 | Stop reason: HOSPADM

## 2024-03-21 RX ORDER — ACETAMINOPHEN 325 MG/1
650 TABLET ORAL EVERY 6 HOURS PRN
Status: DISCONTINUED | OUTPATIENT
Start: 2024-03-21 | End: 2024-03-21 | Stop reason: SDUPTHER

## 2024-03-21 RX ORDER — LIDOCAINE AND PRILOCAINE 25; 25 MG/G; MG/G
CREAM TOPICAL PRN
Status: DISCONTINUED | OUTPATIENT
Start: 2024-03-21 | End: 2024-03-22 | Stop reason: HOSPADM

## 2024-03-21 RX ORDER — SODIUM CHLORIDE 9 MG/ML
INJECTION, SOLUTION INTRAVENOUS CONTINUOUS
Status: DISCONTINUED | OUTPATIENT
Start: 2024-03-21 | End: 2024-03-21

## 2024-03-21 RX ORDER — LIDOCAINE 4 G/G
1 PATCH TOPICAL DAILY
Status: DISCONTINUED | OUTPATIENT
Start: 2024-03-21 | End: 2024-03-22 | Stop reason: HOSPADM

## 2024-03-21 RX ORDER — PANTOPRAZOLE SODIUM 40 MG/1
40 TABLET, DELAYED RELEASE ORAL DAILY
COMMUNITY
Start: 2024-03-15

## 2024-03-21 RX ORDER — TRAMADOL HYDROCHLORIDE 50 MG/1
50 TABLET ORAL EVERY 6 HOURS PRN
Status: DISCONTINUED | OUTPATIENT
Start: 2024-03-21 | End: 2024-03-22 | Stop reason: HOSPADM

## 2024-03-21 RX ORDER — GABAPENTIN 300 MG/1
300 CAPSULE ORAL 2 TIMES DAILY
Status: DISCONTINUED | OUTPATIENT
Start: 2024-03-21 | End: 2024-03-22 | Stop reason: HOSPADM

## 2024-03-21 RX ORDER — INSULIN LISPRO 100 [IU]/ML
0-8 INJECTION, SOLUTION INTRAVENOUS; SUBCUTANEOUS
Status: DISCONTINUED | OUTPATIENT
Start: 2024-03-21 | End: 2024-03-22 | Stop reason: HOSPADM

## 2024-03-21 RX ORDER — POTASSIUM CHLORIDE 750 MG/1
10 CAPSULE, EXTENDED RELEASE ORAL 2 TIMES DAILY
Status: DISCONTINUED | OUTPATIENT
Start: 2024-03-21 | End: 2024-03-22 | Stop reason: HOSPADM

## 2024-03-21 RX ORDER — INSULIN GLARGINE 100 [IU]/ML
42 INJECTION, SOLUTION SUBCUTANEOUS NIGHTLY
Status: DISCONTINUED | OUTPATIENT
Start: 2024-03-21 | End: 2024-03-22 | Stop reason: HOSPADM

## 2024-03-21 RX ORDER — ATORVASTATIN CALCIUM 80 MG/1
80 TABLET, FILM COATED ORAL NIGHTLY
Status: DISCONTINUED | OUTPATIENT
Start: 2024-03-21 | End: 2024-03-22 | Stop reason: HOSPADM

## 2024-03-21 RX ORDER — CARVEDILOL 3.12 MG/1
3.12 TABLET ORAL 2 TIMES DAILY
Status: DISCONTINUED | OUTPATIENT
Start: 2024-03-21 | End: 2024-03-22 | Stop reason: HOSPADM

## 2024-03-21 RX ADMIN — BUMETANIDE 3 MG: 1 TABLET ORAL at 21:55

## 2024-03-21 RX ADMIN — NITROGLYCERIN 0.4 MG: 0.4 TABLET, ORALLY DISINTEGRATING SUBLINGUAL at 11:50

## 2024-03-21 RX ADMIN — SODIUM CHLORIDE, PRESERVATIVE FREE 10 ML: 5 INJECTION INTRAVENOUS at 21:56

## 2024-03-21 RX ADMIN — ACETAMINOPHEN 650 MG: 325 TABLET ORAL at 21:55

## 2024-03-21 RX ADMIN — SODIUM BICARBONATE 1300 MG: 650 TABLET ORAL at 21:55

## 2024-03-21 RX ADMIN — INSULIN LISPRO 4 UNITS: 100 INJECTION, SOLUTION INTRAVENOUS; SUBCUTANEOUS at 21:55

## 2024-03-21 RX ADMIN — SODIUM CHLORIDE, PRESERVATIVE FREE 10 ML: 5 INJECTION INTRAVENOUS at 11:23

## 2024-03-21 RX ADMIN — INSULIN LISPRO 2 UNITS: 100 INJECTION, SOLUTION INTRAVENOUS; SUBCUTANEOUS at 12:49

## 2024-03-21 RX ADMIN — BUMETANIDE 3 MG: 1 TABLET ORAL at 14:47

## 2024-03-21 RX ADMIN — SODIUM BICARBONATE 1300 MG: 650 TABLET ORAL at 14:48

## 2024-03-21 RX ADMIN — INSULIN GLARGINE 42 UNITS: 100 INJECTION, SOLUTION SUBCUTANEOUS at 21:56

## 2024-03-21 RX ADMIN — ASPIRIN 81 MG: 81 TABLET, CHEWABLE ORAL at 14:46

## 2024-03-21 RX ADMIN — AMLODIPINE BESYLATE 2.5 MG: 2.5 TABLET ORAL at 14:45

## 2024-03-21 RX ADMIN — INSULIN LISPRO 6 UNITS: 100 INJECTION, SOLUTION INTRAVENOUS; SUBCUTANEOUS at 17:35

## 2024-03-21 RX ADMIN — ALLOPURINOL 100 MG: 100 TABLET ORAL at 14:49

## 2024-03-21 RX ADMIN — CYCLOBENZAPRINE 5 MG: 10 TABLET, FILM COATED ORAL at 21:55

## 2024-03-21 RX ADMIN — POTASSIUM CHLORIDE 10 MEQ: 10 CAPSULE, COATED, EXTENDED RELEASE ORAL at 14:48

## 2024-03-21 RX ADMIN — POTASSIUM CHLORIDE 10 MEQ: 10 CAPSULE, COATED, EXTENDED RELEASE ORAL at 21:55

## 2024-03-21 RX ADMIN — ATORVASTATIN CALCIUM 80 MG: 80 TABLET, FILM COATED ORAL at 21:55

## 2024-03-21 RX ADMIN — INSULIN GLARGINE 72 UNITS: 100 INJECTION, SOLUTION SUBCUTANEOUS at 09:09

## 2024-03-21 RX ADMIN — NITROGLYCERIN 0.4 MG: 0.4 TABLET, ORALLY DISINTEGRATING SUBLINGUAL at 11:40

## 2024-03-21 RX ADMIN — PANTOPRAZOLE SODIUM 40 MG: 40 TABLET, DELAYED RELEASE ORAL at 14:47

## 2024-03-21 ASSESSMENT — PAIN DESCRIPTION - LOCATION
LOCATION: CHEST
LOCATION: CHEST
LOCATION: FOOT

## 2024-03-21 ASSESSMENT — HEART SCORE: ECG: NON-SPECIFC REPOLARIZATION DISTURBANCE/LBTB/PM

## 2024-03-21 ASSESSMENT — PAIN SCALES - GENERAL
PAINLEVEL_OUTOF10: 6
PAINLEVEL_OUTOF10: 2
PAINLEVEL_OUTOF10: 4
PAINLEVEL_OUTOF10: 10

## 2024-03-21 ASSESSMENT — PAIN DESCRIPTION - DESCRIPTORS: DESCRIPTORS: ACHING

## 2024-03-21 ASSESSMENT — ENCOUNTER SYMPTOMS
SHORTNESS OF BREATH: 0
ABDOMINAL PAIN: 0
EYE PAIN: 0
BACK PAIN: 0
COLOR CHANGE: 0

## 2024-03-21 ASSESSMENT — PAIN DESCRIPTION - PAIN TYPE: TYPE: ACUTE PAIN

## 2024-03-21 ASSESSMENT — PAIN - FUNCTIONAL ASSESSMENT: PAIN_FUNCTIONAL_ASSESSMENT: 0-10

## 2024-03-21 ASSESSMENT — PAIN DESCRIPTION - FREQUENCY: FREQUENCY: CONTINUOUS

## 2024-03-21 NOTE — ED NOTES
Report given to DEDE De Jesus from [facility]   Report method by phone   The following was reviewed with receiving RN:   Current vital signs:  BP (!) 119/52   Pulse 74   Temp 98 °F (36.7 °C) (Oral)   Resp 15   Ht 1.651 m (5' 5\")   Wt 99.3 kg (219 lb)   SpO2 92%   BMI 36.44 kg/m²                MEWS Score: 1     Any medication or safety alerts were reviewed. Any pending diagnostics and notifications were also reviewed, as well as any safety concerns or issues, abnormal labs, abnormal imaging, and abnormal assessment findings. Questions were answered.

## 2024-03-21 NOTE — ED PROVIDER NOTES
EMERGENCY DEPARTMENT ENCOUNTER    Pt Name: Estefany Garcia  MRN: [id number]  Birthdate 1960  Date of evaluation: 3/21/24  CHIEF COMPLAINT       Chief Complaint   Patient presents with    Chest Pain    Hypoglycemia    Dizziness    Nausea     Chest pain and  dizziness started one hour ago      HISTORY OF PRESENT ILLNESS   64-year-old female presents for complaints of chest pain.  Patient reports pain started approximately 1 hour prior to arrival she was making something to eat.  Located in the center of her chest describes as sharp.  She denies anything making it significantly better or worse.  Admits associated shortness of breath and some nausea.  She denies radiation of pain, she does report history of CAD had recent stents placed.  Had dialysis today had full session.    The history is provided by the patient.           REVIEW OF SYSTEMS     Review of Systems   Constitutional:  Negative for fever.   HENT:  Negative for congestion and ear pain.    Eyes:  Negative for pain.   Respiratory:  Negative for shortness of breath.    Cardiovascular:  Positive for chest pain. Negative for palpitations and leg swelling.   Gastrointestinal:  Negative for abdominal pain.   Genitourinary:  Negative for dysuria and flank pain.   Musculoskeletal:  Negative for back pain.   Skin:  Negative for color change.   Neurological:  Negative for numbness and headaches.   Psychiatric/Behavioral:  Negative for confusion.    All other systems reviewed and are negative.    PASTMEDICAL HISTORY     Past Medical History:   Diagnosis Date    Arthritis     Backache, unspecified     CAD (coronary artery disease)     Cerebral artery occlusion with cerebral infarction (HCC)     CHF (congestive heart failure) (HCC)     Chronic kidney disease     Coronary atherosclerosis of artery bypass graft     COVID 01/31/2022    Cramp of limb     Epistaxis 03/05/2023    Gallstones     Hemodialysis patient (HCC)     MONDAY WEDNESDAY AND FRIDAYS  /  [facility] IN  following components:    Troponin, High Sensitivity 114 (*)     All other components within normal limits   TROPONIN - Abnormal; Notable for the following components:    Troponin, High Sensitivity 114 (*)     All other components within normal limits   LIPASE   MAGNESIUM   PROTIME-INR   POC GLUCOSE FINGERSTICK       Vitals Reviewed:    Vitals:    03/21/24 0115 03/21/24 0130 03/21/24 0245 03/21/24 0255   BP: (!) 120/50 (!) 126/54 (!) 119/52 (!) 119/52   Pulse: 68 68 73 74   Resp: 15 16 12 15   Temp:    98 °F (36.7 °C)   TempSrc:    Oral   SpO2: 92% 96% 91% 92%   Weight:       Height:         MEDICATIONS GIVEN TO PATIENT THIS ENCOUNTER:  No orders of the defined types were placed in this encounter.    DISCHARGE PRESCRIPTIONS:  New Prescriptions    No medications on file     PHYSICIAN CONSULTS ORDERED THIS ENCOUNTER:  IP CONSULT TO FAMILY MEDICINE  IP CONSULT TO CARDIOLOGY  FINAL IMPRESSION      1. Chest pain, unspecified type          DISPOSITION/PLAN   DISPOSITION Admitted 03/21/2024 02:27:42 AM      OUTPATIENT FOLLOW UP THE PATIENT:  No follow-up provider specified.    DO Rickie Porter Nathan R, DO  03/21/24 0315

## 2024-03-21 NOTE — PROGRESS NOTES
RN spoke with Merlin Leach regarding pt severe chest pain 2 nitro were given, pt stated some relief. And asked if pt needed to remain NPO. Ok to start cardiac diet.

## 2024-03-21 NOTE — PROGRESS NOTES
Dr. Katz notified of consult and updated on patient labs and results and history.    Orders to keep patient NPO.

## 2024-03-21 NOTE — PROGRESS NOTES
Patient admit to room 2113. Patient transferred via wheelchair. Patient educated on call light and fall precautions. Bed alarm on, call light in reach. Patient denies any further chest pain. Nurse reviewed current plan with patient.

## 2024-03-21 NOTE — PROGRESS NOTES
03/21/24 1409   Encounter Summary   Encounter Overview/Reason  Volunteer Encounter   Service Provided For: Patient   Referral/Consult From: Nhi   Last Encounter  03/21/24   Complexity of Encounter Low   Spiritual/Emotional needs   Type Spiritual Support   Rituals, Rites and Sacraments   Type Religious Communion

## 2024-03-21 NOTE — CONSULTS
115.2 kg (253 lb 15.5 oz)   SpO2 92%   BMI 42.26 kg/m²   TEMPERATURE:  Current - Temp: 98.2 °F (36.8 °C); Max - Temp  Av.2 °F (36.8 °C)  Min: 98 °F (36.7 °C)  Max: 98.6 °F (37 °C)  RESPIRATIONS RANGE: Resp  Av  Min: 12  Max: 21  PULSE RANGE: Pulse  Av.9  Min: 63  Max: 98  BLOOD PRESSURE RANGE:  Systolic (24hrs), Av , Min:102 , Max:137  ; Diastolic (24hrs), Av, Min:40, Max:76   PULSE OXIMETRY RANGE: SpO2  Av.7 %  Min: 91 %  Max: 97 %  24HR INTAKE/OUTPUT:  No intake or output data in the 24 hours ending 24 1525    Constitutional: alert, appears stated age, and cooperative    Skin: Skin color, texture, turgor normal. No rashes or lesions    Head: Normocephalic, without obvious abnormality, atraumatic     Neck:  supple with no JVD.    Cardiovascular/Edema: regular rate and rhythm, S1, S2 normal, no murmur, click, rub or gallop    Respiratory: Lungs: clear to auscultation bilaterally    Abdomen: soft, non-tender; bowel sounds normal; no masses,  no organomegaly    Back: symmetric, no curvature. ROM normal. No CVA tenderness.    Extremities: extremities normal, atraumatic, no cyanosis or edema and Left arm AV fistula with good thrill and bruit.    Neuro:  Grossly normal    CBC:   Recent Labs     24  0026   WBC 6.6   HGB 9.5*        BMP:    Recent Labs     24  0026      K 3.5*   CL 96*   CO2 28   BUN 31*   CREATININE 3.5*   GLUCOSE 97     Lab Results   Component Value Date/Time    NITRU NEGATIVE 2023 03:47 PM    COLORU Yellow 2023 03:47 PM    PHUR 5.5 2023 03:47 PM    WBCUA TOO NUMEROUS TO COUNT 2023 03:47 PM    RBCUA 3 to 5 2023 03:47 PM    MUCUS 1+ 2023 03:00 PM    TRICHOMONAS NOT REPORTED 2021 02:05 AM    YEAST MODERATE 2023 03:47 PM    BACTERIA MANY 2023 03:47 PM    SPECGRAV 1.013 2023 03:47 PM    LEUKOCYTESUR LARGE 2023 03:47 PM    UROBILINOGEN Normal 2023 03:47 PM    BILIRUBINUR  NEGATIVE 08/05/2023 03:47 PM    GLUCOSEU MOD 08/05/2023 03:47 PM    KETUA NEGATIVE 08/05/2023 03:47 PM    AMORPHOUS 1+ 02/25/2023 03:15 AM     Urine Sodium:     Lab Results   Component Value Date/Time    JASON 47 11/14/2021 02:04 AM     Urine Chloride:    Lab Results   Component Value Date/Time    CLUR 26 11/14/2021 02:04 AM     Urine Protein:     Lab Results   Component Value Date/Time    TPU 20 11/12/2021 10:30 AM     Urine Creatinine:     Lab Results   Component Value Date/Time    LABCREA 65.4 01/26/2023 01:10 PM     IMPRESSION/RECOMMENDATIONS:      1.  End-stage renal disease - we will schedule for hemodialysis tomorrow morning per [facility] schedule.    2.  Chest pain - will defer to cardiology.    3.  Systemic hypertension - blood pressure control is adequate.    Prognosis is guarded.    Thank you very much for the courtesy and confidence of this consultation.      Cherri Landis MD FACP  Attending Nephrologist  3/21/2024 3:19 PM

## 2024-03-21 NOTE — ACP (ADVANCE CARE PLANNING)
Advance Care Planning     Advance Care Planning Activator (Inpatient)  Conversation Note      Date of ACP Conversation: 3/21/2024     Conversation Conducted with: Patient with Decision Making Capacity    ACP Activator: Di Mendosa RN        Health Care Decision Maker:     Current Designated Health Care Decision Maker:     Primary Decision Maker: HayHailey - Brother/Sister - [phone]    Secondary Decision Maker: Nancy Perdomo - Brother/Sister - [phone]  Click here to complete Healthcare Decision Makers including section of the Healthcare Decision Maker Relationship (ie \"Primary\")      Care Preferences    Ventilation:  \"If you were in your present state of health and suddenly became very ill and were unable to breathe on your own, what would your preference be about the use of a ventilator (breathing machine) if it were available to you?\"      Would the patient desire the use of ventilator (breathing machine)?: yes    \"If your health worsens and it becomes clear that your chance of recovery is unlikely, what would your preference be about the use of a ventilator (breathing machine) if it were available to you?\"     Would the patient desire the use of ventilator (breathing machine)?: Yes      Resuscitation  \"CPR works best to restart the heart when there is a sudden event, like a heart attack, in someone who is otherwise healthy. Unfortunately, CPR does not typically restart the heart for people who have serious health conditions or who are very sick.\"    \"In the event your heart stopped as a result of an underlying serious health condition, would you want attempts to be made to restart your heart (answer \"yes\" for attempt to resuscitate) or would you prefer a natural death (answer \"no\" for do not attempt to resuscitate)?\" yes       [] Yes   [] No   Educated Patient / Decision Maker regarding differences between Advance Directives and portable DNR orders.    Length of ACP Conversation in minutes:

## 2024-03-21 NOTE — CONSULTS
[facility]    Consult                        Today's Date: 3/21/2024  Patient Name: Estefany Garcia  Date of admission: 3/21/2024 12:16 AM  Patient's age: 64 y.o., 1960  Admission Dx: Chest pain [R07.9]  Chest pain, unspecified type [R07.9]    Reason for Consult:  Cardiac evaluation    Requesting Physician: Niesha Sunshine MD    CHIEF COMPLAINT: Chest pain    History Obtained From:  patient, electronic medical record    HISTORY OF PRESENT ILLNESS:      The patient is a 64 y.o.  female   with h/o DM, HTN, ESRD, CAD, CABG and PCI, who is admitted to the hospital for recurrent midsternal sharp nonradiating chest pain. troponins chronically elevated . Stated had an episode for low blood sugar where it went to 40's prior to her chest pain stated she hasn't eaten that date , still co of midsternal chest wall discomfort tender to touch , SR on tele     Past Medical History:   has a past medical history of Arthritis, Backache, unspecified, CAD (coronary artery disease), Cerebral artery occlusion with cerebral infarction (HCC), CHF (congestive heart failure) ([facility]), Chronic kidney disease, Coronary atherosclerosis of artery bypass graft, COVID, Cramp of limb, Epistaxis, Gallstones, Hemodialysis patient ([facility]), Hyperlipidemia, Hypertension, Insomnia, Neuromuscular disorder (HCC), Pneumonia, Psychiatric problem, Thyroid disease, Type II or unspecified type diabetes mellitus with renal manifestations, not stated as uncontrolled(250.40), Type II or unspecified type diabetes mellitus without mention of complication, not stated as uncontrolled, and Unspecified vitamin D deficiency.    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 Has had multiple MRI's and HCT's that are unremarkable.    RECOMMENDATIONS:  Atypical chest pain tender to touch could musculoskeletal in nature versus GI -Had a recent cardiac workup -continue beta blocker dual antiplatelets, allergies to antianginal medication causing angioedema  Volume management per dialysis  No further cardiac workup needed , please teofilo us back if needed      Discussed with patient and Nurse    FASANEH Spivey - NP    [facility]           [phone]

## 2024-03-21 NOTE — PROGRESS NOTES
Consult received and completed. Patient in bed upon entry, report being able to rest better today.  Patient asked about  Leydi, ask if I would let her know that she is here.  Patient engaged in conversation, very present person.  Patient report having family members for support.  Spiritual care will remain available as needed.     03/21/24 1050   Encounter Summary   Encounter Overview/Reason  Initial Encounter;Spiritual/Emotional Needs   Service Provided For: Patient   Referral/Consult From: Nurse   Support System Family members   Last Encounter  03/21/24   Complexity of Encounter Moderate   Begin Time 1050   End Time  1105   Total Time Calculated 15 min   Spiritual/Emotional needs   Type Spiritual Support   Assessment/Intervention/Outcome   Assessment Calm;Coping   Intervention Active listening;Discussed illness injury and it’s impact;Explored/Affirmed feelings, thoughts, concerns;Nurtured Hope;Prayer (assurance of)/Kyle;Read/Provided Scripture;Sustaining Presence/Ministry of presence   Outcome Acceptance;Engaged in conversation;Expressed feelings, needs, and concerns;Expressed Gratitude;Receptive

## 2024-03-21 NOTE — ED TRIAGE NOTES
Mode of arrival (squad #, walk in, police, etc) : Squad    Chief complaint(s): CP    Arrival Note (brief scenario, treatment PTA, etc).: Chest pain with Lightheadedness  and shortness of breathing started at 11pm yesterday. Patient was  preparing some english   muffin for herself  when she started these symptoms. Patient is diabetic and  her  Dexcom   read blood Sugar of 55 , Patient took her own nitro when she started chest pain and  given 324 mg ASA by squad , Patient is CKD  with Dialysis  three time per week , Went to her dialysis today for 3.5 hrs . Fistula  on left upper arm . Pt is A&O X 3.     C= \"Have you ever felt that you should Cut down on your drinking?\"  No  A= \"Have people Annoyed you by criticizing your drinking?\"  No  G= \"Have you ever felt bad or Guilty about your drinking?\"  No  E= \"Have you ever had a drink as an Eye-opener first thing in the morning to steady your nerves or to help a hangover?\"  No      Deferred []      Reason for deferring: N/A    *If yes to two or more: probable alcohol abuse.*

## 2024-03-21 NOTE — CARE COORDINATION
Case Management Assessment  Initial Evaluation    Date/Time of Evaluation: 3/21/2024 2:13 PM  Assessment Completed by: Di Mendosa RN    If patient is discharged prior to next notation, then this note serves as note for discharge by case management.    Patient Name: Estefany Garcia                   YOB: 1960  Diagnosis: Chest pain [R07.9]  Chest pain, unspecified type [R07.9]                   Date / Time: 3/21/2024 12:16 AM    Patient Admission Status: Inpatient   Readmission Risk (Low < 19, Mod (19-27), High > 27): Readmission Risk Score: 38.4      Current PCP: Zulma Payne APRN - CNP  PCP verified by CM? Yes     Chart Reviewed: Yes      History Provided by: Patient  Patient Orientation: Alert and Oriented    Patient Cognition: Alert     Hospitalization in the last 30 days (Readmission):  Yes    If yes, Readmission Assessment in CM Navigator will be completed.     Advance Directives:       Code Status: Full Code   Patient's Primary Decision Maker is: Legal Next of Kin    Primary Decision Maker: Hailey Guido - Brother/Sister - [phone]    Secondary Decision Maker: Nancy Perdomo - Brother/Sister - [phone]     Discharge Planning:     Patient lives with: Alone Type of Home: House  Primary Care Giver: Self  Patient Support Systems include: Family Members (Sisters)   Current Financial resources: Medicare, Medicaid  Current community resources: ECF/Home Care  Current services prior to admission: Durable Medical Equipment, Home Care, Other (Comment) (Dialysis, [facility], M/W/F at 10:15. Dr Darnell)            Current DME: Walker, Shower Chair, Glucometer, Cane, Wheelchair, Other (Comment) (Dex Com)            Type of Home Care services:  PT, OT, Skilled Therapy ([facility])     ADLS  Prior functional level: Independent in ADLs/IADLs, Assistance with the following:, Shopping, Other (see comment) (Driving)  Current functional level: Shopping, Other (see comment) (Driving)     PT AM-PAC:    /24  OT AM-PAC:   /24     Family can provide assistance at DC: Yes  Would you like Case Management to discuss the discharge plan with any other family members/significant others, and if so, who? No  Plans to Return to Present Housing: Yes  Other Identified Issues/Barriers to RETURNING to current housing: None  Potential Assistance needed at discharge: N/A            Potential DME:    Patient expects to discharge to: House  Plan for transportation at discharge: Other (see comment) (Sisters)     Financial     Payor: [employer] DUAL BENEFITS / Plan: [employer] DUAL / Product Type: *No Product type* /      Does insurance require precert for SNF: Yes     Potential assistance Purchasing Medications: No  Meds-to-Beds request:          [facility] [address] [phone] - F [phone]  [address] [place] [address]  Phone: [phone] Fax: [phone]     [facility] [address] [place] [address] [phone] -  [phone]  [facility] [address]  Phone: [phone] Fax: [phone]        Notes:     Factors facilitating achievement of predicted outcomes: Family support, Cooperative, Pleasant, and Has needed Durable Medical Equipment at home     Barriers to discharge: None     Additional Case Management Notes: 3/21/24 [employer] Dual Pt. Lives in 1 story Condo alone. Sisters help w/ all needs. DME- Shahriar Jj, WC, Glucometer, Dex Com, SC, Current w/ [facility], [facility]- M/W/F @ 10:15, Dr Darnell, will need signed/completed Dialysis today //tv       The Plan for Transition of Care is related to the following treatment goals of Chest pain [R07.9]  Chest pain, unspecified type [R07.9]    IF APPLICABLE: The Patient and/or patient representative Estefany and her family were provided with a choice of provider and agrees with the discharge plan. Freedom of choice list with basic dialogue that supports the patient's individualized

## 2024-03-21 NOTE — PLAN OF CARE
Problem: Discharge Planning  Goal: Discharge to home or other facility with appropriate resources  Outcome: Progressing  Flowsheets (Taken 3/21/2024 1649)  Discharge to home or other facility with appropriate resources:   Identify barriers to discharge with patient and caregiver   Identify discharge learning needs (meds, wound care, etc)   Arrange for interpreters to assist at discharge as needed     Problem: Pain  Goal: Verbalizes/displays adequate comfort level or baseline comfort level  Outcome: Progressing  Flowsheets (Taken 3/21/2024 1649)  Verbalizes/displays adequate comfort level or baseline comfort level:   Encourage patient to monitor pain and request assistance   Assess pain using appropriate pain scale     Problem: Safety - Adult  Goal: Free from fall injury  Outcome: Progressing  Flowsheets (Taken 3/21/2024 1649)  Free From Fall Injury: Based on caregiver fall risk screen, instruct family/caregiver to ask for assistance with transferring infant if caregiver noted to have fall risk factors     Problem: ABCDS Injury Assessment  Goal: Absence of physical injury  Outcome: Progressing  Flowsheets (Taken 3/21/2024 1649)  Absence of Physical Injury: Implement safety measures based on patient assessment

## 2024-03-21 NOTE — H&P
LFT'S  Recent Labs     03/21/24  0026   AST 19   ALT 18   BILITOT 0.5   ALKPHOS 131*     COAG  Recent Labs     03/21/24  0026   INR 0.9     CARDIAC ENZYMES  No results for input(s): \"CKTOTAL\", \"CKMB\", \"CKMBINDEX\", \"TROPONINI\" in the last 72 hours.    U/A:    Lab Results   Component Value Date/Time    COLORU Yellow 08/05/2023 03:47 PM    WBCUA TOO NUMEROUS TO COUNT 08/05/2023 03:47 PM    RBCUA 3 to 5 08/05/2023 03:47 PM    MUCUS 1+ 02/07/2023 03:00 PM    BACTERIA MANY 08/05/2023 03:47 PM    SPECGRAV 1.013 08/05/2023 03:47 PM    LEUKOCYTESUR LARGE 08/05/2023 03:47 PM    GLUCOSEU MOD 08/05/2023 03:47 PM    AMORPHOUS 1+ 02/25/2023 03:15 AM       ABG    Lab Results   Component Value Date/Time    ZUA0NZJ 30.4 04/24/2022 01:42 PM    P7VPDQSF 82.8 04/24/2022 01:42 PM    PHART 7.429 04/24/2022 01:42 PM    BOW1DLI 45.9 04/24/2022 01:42 PM    PO2ART 47.6 04/24/2022 01:42 PM           Active Hospital Problems    Diagnosis Date Noted    Renovascular hypertension [I15.0] 08/03/2023     Priority: High    Chest pain [R07.9] 03/22/2023     Priority: Medium    ESRD on hemodialysis ([facility]) [N18.6, Z99.2] 08/03/2022     Priority: Medium    Morbid obesity with BMI of 40.0-44.9, adult ([facility]) [E66.01, Z68.41] 09/14/2021    Essential hypertension [I10] 05/03/2021    Anemia of chronic disease [D63.8] 05/03/2021    Type 2 diabetes 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, with long-term current use of insulin ([facility]) [E11.22, N18.6, Z99.2, Z79.4] 09/20/2018         ASSESSMENT/PLAN:  Patient admitted with Chest Pain r/o ACS. Co-morbidities as above.    -Cardiology consulted - further investigations per their service.  -ESRD on dialysis - Nephrology consulted.  -DM2 - stable om insulin, SS coverage & carb control diet.  -Home medications reviewed & reconciled.  -Complete orders per chart.    DVT Prophylaxis:   Diet: Diet NPO  Code Status: Full Code    Time spent in pre-visit planning, interview, exam, /education and  coordination of care: 78 minutes.    Dispo - admitted to [facility]       @[facility]@

## 2024-03-22 VITALS
WEIGHT: 246.25 LBS | BODY MASS INDEX: 41.03 KG/M2 | HEART RATE: 80 BPM | DIASTOLIC BLOOD PRESSURE: 52 MMHG | RESPIRATION RATE: 18 BRPM | TEMPERATURE: 97.8 F | SYSTOLIC BLOOD PRESSURE: 135 MMHG | HEIGHT: 65 IN | OXYGEN SATURATION: 97 %

## 2024-03-22 LAB
ALBUMIN SERPL-MCNC: 3.5 G/DL (ref 3.5–5.2)
ALP SERPL-CCNC: 118 U/L (ref 35–104)
ALT SERPL-CCNC: 15 U/L (ref 5–33)
ANION GAP SERPL CALCULATED.3IONS-SCNC: 13 MMOL/L (ref 9–17)
AST SERPL-CCNC: 19 U/L
BILIRUB SERPL-MCNC: 0.5 MG/DL (ref 0.3–1.2)
BUN SERPL-MCNC: 45 MG/DL (ref 8–23)
CALCIUM SERPL-MCNC: 9.2 MG/DL (ref 8.6–10.4)
CHLORIDE SERPL-SCNC: 97 MMOL/L (ref 98–107)
CHOLEST SERPL-MCNC: 114 MG/DL
CHOLESTEROL/HDL RATIO: 2.4
CO2 SERPL-SCNC: 26 MMOL/L (ref 20–31)
CREAT SERPL-MCNC: 4.7 MG/DL (ref 0.5–0.9)
ERYTHROCYTE [DISTWIDTH] IN BLOOD BY AUTOMATED COUNT: 18.8 % (ref 11.5–14.9)
EST. AVERAGE GLUCOSE BLD GHB EST-MCNC: 134 MG/DL
GFR SERPL CREATININE-BSD FRML MDRD: 10 ML/MIN/1.73M2
GLUCOSE BLD-MCNC: 260 MG/DL (ref 65–105)
GLUCOSE BLD-MCNC: 269 MG/DL (ref 65–105)
GLUCOSE BLD-MCNC: 414 MG/DL (ref 65–105)
GLUCOSE BLD-MCNC: 462 MG/DL (ref 65–105)
GLUCOSE SERPL-MCNC: 287 MG/DL (ref 70–99)
HAPTOGLOB SERPL-MCNC: 86 MG/DL (ref 30–200)
HBA1C MFR BLD: 6.3 % (ref 4–6)
HCT VFR BLD AUTO: 25.7 % (ref 36–46)
HDLC SERPL-MCNC: 47 MG/DL
HGB BLD-MCNC: 8.6 G/DL (ref 12–16)
LDLC SERPL CALC-MCNC: 30 MG/DL (ref 0–130)
MAGNESIUM SERPL-MCNC: 2.4 MG/DL (ref 1.6–2.6)
MCH RBC QN AUTO: 37 PG (ref 26–34)
MCHC RBC AUTO-ENTMCNC: 33.3 G/DL (ref 31–37)
MCV RBC AUTO: 111.2 FL (ref 80–100)
PLATELET # BLD AUTO: 143 K/UL (ref 150–450)
PMV BLD AUTO: 8.1 FL (ref 6–12)
POTASSIUM SERPL-SCNC: 4.2 MMOL/L (ref 3.7–5.3)
PROT SERPL-MCNC: 6.7 G/DL (ref 6.4–8.3)
RBC # BLD AUTO: 2.32 M/UL (ref 4–5.2)
SODIUM SERPL-SCNC: 136 MMOL/L (ref 135–144)
TRIGL SERPL-MCNC: 184 MG/DL
WBC OTHER # BLD: 4.5 K/UL (ref 3.5–11)

## 2024-03-22 PROCEDURE — 2580000003 HC RX 258: Performed by: FAMILY MEDICINE

## 2024-03-22 PROCEDURE — 82947 ASSAY GLUCOSE BLOOD QUANT: CPT

## 2024-03-22 PROCEDURE — 83735 ASSAY OF MAGNESIUM: CPT

## 2024-03-22 PROCEDURE — 80053 COMPREHEN METABOLIC PANEL: CPT

## 2024-03-22 PROCEDURE — 80061 LIPID PANEL: CPT

## 2024-03-22 PROCEDURE — 90935 HEMODIALYSIS ONE EVALUATION: CPT

## 2024-03-22 PROCEDURE — 83010 ASSAY OF HAPTOGLOBIN QUANT: CPT

## 2024-03-22 PROCEDURE — G0378 HOSPITAL OBSERVATION PER HR: HCPCS

## 2024-03-22 PROCEDURE — 6370000000 HC RX 637 (ALT 250 FOR IP): Performed by: FAMILY MEDICINE

## 2024-03-22 PROCEDURE — 86022 PLATELET ANTIBODIES: CPT

## 2024-03-22 PROCEDURE — 36415 COLL VENOUS BLD VENIPUNCTURE: CPT

## 2024-03-22 PROCEDURE — 99239 HOSP IP/OBS DSCHRG MGMT >30: CPT | Performed by: FAMILY MEDICINE

## 2024-03-22 PROCEDURE — 83036 HEMOGLOBIN GLYCOSYLATED A1C: CPT

## 2024-03-22 PROCEDURE — 85027 COMPLETE CBC AUTOMATED: CPT

## 2024-03-22 RX ADMIN — POTASSIUM CHLORIDE 10 MEQ: 10 CAPSULE, COATED, EXTENDED RELEASE ORAL at 13:37

## 2024-03-22 RX ADMIN — DOCUSATE SODIUM 100 MG: 100 CAPSULE, LIQUID FILLED ORAL at 13:37

## 2024-03-22 RX ADMIN — GABAPENTIN 300 MG: 300 CAPSULE ORAL at 13:37

## 2024-03-22 RX ADMIN — FERROUS SULFATE TAB 325 MG (65 MG ELEMENTAL FE) 325 MG: 325 (65 FE) TAB at 13:37

## 2024-03-22 RX ADMIN — VENLAFAXINE HYDROCHLORIDE 75 MG: 75 CAPSULE, EXTENDED RELEASE ORAL at 13:37

## 2024-03-22 RX ADMIN — AMLODIPINE BESYLATE 2.5 MG: 2.5 TABLET ORAL at 13:36

## 2024-03-22 RX ADMIN — PANTOPRAZOLE SODIUM 40 MG: 40 TABLET, DELAYED RELEASE ORAL at 13:37

## 2024-03-22 RX ADMIN — SODIUM BICARBONATE 1300 MG: 650 TABLET ORAL at 13:37

## 2024-03-22 RX ADMIN — ASPIRIN 81 MG: 81 TABLET, CHEWABLE ORAL at 13:36

## 2024-03-22 RX ADMIN — CARVEDILOL 3.12 MG: 3.12 TABLET, FILM COATED ORAL at 13:37

## 2024-03-22 RX ADMIN — BUSPIRONE HYDROCHLORIDE 10 MG: 10 TABLET ORAL at 13:37

## 2024-03-22 RX ADMIN — PRASUGREL 10 MG: 10 TABLET, FILM COATED ORAL at 13:37

## 2024-03-22 RX ADMIN — INSULIN LISPRO 8 UNITS: 100 INJECTION, SOLUTION INTRAVENOUS; SUBCUTANEOUS at 13:50

## 2024-03-22 RX ADMIN — SODIUM CHLORIDE, PRESERVATIVE FREE 10 ML: 5 INJECTION INTRAVENOUS at 13:47

## 2024-03-22 RX ADMIN — ALLOPURINOL 100 MG: 100 TABLET ORAL at 13:36

## 2024-03-22 RX ADMIN — INSULIN GLARGINE 72 UNITS: 100 INJECTION, SOLUTION SUBCUTANEOUS at 13:37

## 2024-03-22 RX ADMIN — BUMETANIDE 3 MG: 1 TABLET ORAL at 13:37

## 2024-03-22 ASSESSMENT — PAIN SCALES - GENERAL
PAINLEVEL_OUTOF10: 8
PAINLEVEL_OUTOF10: 10

## 2024-03-22 ASSESSMENT — PAIN DESCRIPTION - DESCRIPTORS
DESCRIPTORS: SORE
DESCRIPTORS: ACHING;SORE

## 2024-03-22 ASSESSMENT — PAIN DESCRIPTION - LOCATION
LOCATION: BUTTOCKS
LOCATION: BUTTOCKS;LEG

## 2024-03-22 ASSESSMENT — PAIN DESCRIPTION - PAIN TYPE
TYPE: ACUTE PAIN
TYPE: ACUTE PAIN;CHRONIC PAIN

## 2024-03-22 ASSESSMENT — PAIN DESCRIPTION - ORIENTATION: ORIENTATION: RIGHT;LEFT

## 2024-03-22 NOTE — DISCHARGE SUMMARY
Family Medicine Discharge Summary    Estefany Garcia  :  1960  MRN:  [id number]    Admit date:  3/21/2024  Discharge date:  3/22/2024    Admitting Physician:  Niesha Sunshine MD    Discharge Diagnoses:    Patient Active Problem List   Diagnosis    Atherosclerosis of coronary artery bypass graft of native heart with angina pectoris ([facility])    Acute kidney injury superimposed on CKD ([facility])    Acute on chronic diastolic heart failure ([facility])    Diabetic polyneuropathy associated with type 2 diabetes mellitus ([facility])    History of coronary artery bypass graft    Iron deficiency anemia    Spinal stenosis of lumbar region with neurogenic claudication    Mixed hyperlipidemia    CKD (chronic kidney disease) stage 4, GFR 15-29 ml/min ([facility])    Type 2 diabetes 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, with long-term current use of insulin ([facility])    Obesity, Class II, BMI 35-39.9    Thyroid nodule greater than or equal to 1 cm in diameter incidentally noted on imaging study    Essential hypertension    Anemia of chronic disease    Chronic ischemic heart disease    Ischemic stroke of frontal lobe ([facility])    Morbid obesity with BMI of 40.0-44.9, adult ([facility])    Disequilibrium syndrome    Anxiety    Chronic midline low back pain with bilateral sciatica    Acute thoracic back pain    COVID    Delirium due to another medical condition    Cerebral hypoperfusion    Immature arteriovenous fistula ([facility])    History of fusion of cervical spine    Depression with anxiety    Vancomycin resistant Enterococcus UTI    ESRD on hemodialysis ([facility])    Dialysis disequilibrium syndrome    Acute cystitis without hematuria    VRE infection (vancomycin resistant Enterococcus)    Infection due to ESBL-producing Klebsiella pneumoniae    Class 2 severe obesity due to excess calories with serious comorbidity and body mass index (BMI) of 35.0 to 35.9 in adult ([facility])    Type 2 diabetes mellitus with hyperglycemia, with long-term current use of  skin 3 times daily (before meals) Inject 20 units into the skin 3 times daily, before meals.  Additionally sliding scale coverage as needed 3 times a day: for blood sugar  no additional insulin.  Sugar 140-199 give 3 units.  Sugar 200-249 give 6 units.  Sugar 250-299 give 9 units.  Sugar 300-3 49 give 12 units.  Sugar 3  give 15 units.  Sugar over 400 give 18 units.    Maximum allowable amount 103 units daily     pantoprazole 40 MG tablet  Commonly known as: PROTONIX     polyvinyl alcohol 1.4 % ophthalmic solution  Commonly known as: LIQUIFILM TEARS     potassium chloride 10 MEQ extended release capsule  Commonly known as: MICRO-K     prasugrel 10 MG Tabs  Commonly known as: EFFIENT  Take 1 tablet by mouth daily     sevelamer 800 MG tablet  Commonly known as: Renvela  Take 2 tablets by mouth 3 times daily (with meals)     sodium bicarbonate 650 MG tablet  Take 2 tablets by mouth twice daily     venlafaxine 75 MG extended release capsule  Commonly known as: EFFEXOR XR  TAKE 1 CAPSULE BY MOUTH ONCE DAILY WITH BREAKFAST            STOP taking these medications      sodium chloride 0.65 % nasal spray  Commonly known as: OCEAN, BABY AYR              Consults:  cardiology    Significant Diagnostic Studies:  labs, radiology    Treatments:   supportive therapies    Disposition:   home  Follow up with Zulma Payne APRN - CNP in 1-2 weeks.    Signed:  Madonna Aviles MD, FAAFP  3/22/2024, 3:55 PM

## 2024-03-22 NOTE — DISCHARGE INSTR - COC
Continuity of Care Form    Patient Name: Estefany Garcia   :  1960  MRN:  [id number]    Admit date:  3/21/2024  Discharge date:  ***    Code Status Order: Full Code   Advance Directives:     Admitting Physician:  Niesha Sunshine MD  PCP: Zulma Payne, APRN - CNP    Discharging Nurse: ***  Discharging Hospital Unit/Room#: 3/3-01  Discharging Unit Phone Number: ***    Emergency Contact:   Extended Emergency Contact Information  Primary Emergency Contact: Hailey Guido  Home Phone: [phone]  Relation: Brother/Sister  Secondary Emergency Contact: Nancy Perdomo  Home Phone: [phone]  Relation: Brother/Sister    Past Surgical History:  Past Surgical History:   Procedure Laterality Date    ANKLE FRACTURE SURGERY      AV FISTULA CREATION  2021    BACK SURGERY      BREAST SURGERY      CARDIAC CATHETERIZATION      MVD  /  CT CONSULT    CARDIAC PROCEDURE N/A 2023    taleb / Coronary angiography / op scmh performed by Jairo Ceja MD at [facility] CARDIAC CATH LAB    CARDIAC PROCEDURE N/A 11/15/2023    talgenie / Percutaneous coronary intervention performed by Jairo Ceja MD at [facility] CARDIAC CATH LAB    CARDIAC PROCEDURE N/A 2024    taleb / Left heart cath / op scmh performed by Jairo Ceja MD at [facility] CARDIAC CATH LAB    CARDIAC PROCEDURE N/A 2024    Percutaneous coronary intervention performed by Jairo Ceja MD at [facility] CARDIAC CATH LAB    CARDIAC SURGERY      CARPAL TUNNEL RELEASE      CHOLECYSTECTOMY, OPEN N/A     COLONOSCOPY      CORONARY ANGIOPLASTY WITH STENT PLACEMENT  2023    PTCA / FADIA RCA    CORONARY ANGIOPLASTY WITH STENT PLACEMENT  2019    STENT X1 AT [facility]    CORONARY ANGIOPLASTY WITH STENT PLACEMENT  2023    DR CEJA  /  FADIA SVG-DIAG  /  NEEDS RCA DONE    CORONARY ARTERY BYPASS GRAFT  02/2005    X3 [facility]    DIALYSIS CATHETER INSERTION Right 2023    CVC CATHETER REPLACEMENT right chest performed by Bib Marinelli MD  Schedule:  Phone:  Fax:    / signature: Electronically signed by Di Mendosa RN on 3/22/24 at 11:29 AM EDT    PHYSICIAN SECTION    Prognosis: Fair    Condition at Discharge: Stable    Rehab Potential (if transferring to Rehab): Fair    Recommended Labs or Other Treatments After Discharge:     Physician Certification: I certify the above information and transfer of Estefany Garcia  is necessary for the continuing treatment of the diagnosis listed and that she requires Home Care for greater 30 days.     Update Admission H&P: No change in H&P    PHYSICIAN SIGNATURE:  Electronically signed by Madonna Aviles MD on 3/22/24 at 8:23 AM EDT

## 2024-03-22 NOTE — PROGRESS NOTES
Progress Note  Date:3/22/2024       Room:[address] [place] [address]  Patient Name:Estefany Garcia     YOB: 1960     Age:64 y.o.        Subjective    Subjective:  Symptoms:  (Patient did not awaken for exam despite repeated verbal efforts. ).       Review of Systems  Objective         Vitals Last 24 Hours:  TEMPERATURE:  Temp  Av.6 °F (37 °C)  Min: 98.2 °F (36.8 °C)  Max: 99.2 °F (37.3 °C)  RESPIRATIONS RANGE: Resp  Av  Min: 18  Max: 18  PULSE OXIMETRY RANGE: SpO2  Av %  Min: 90 %  Max: 100 %  PULSE RANGE: Pulse  Av.6  Min: 73  Max: 85  BLOOD PRESSURE RANGE: Systolic (24hrs), Av , Min:108 , Max:148   ; Diastolic (24hrs), Av, Min:40, Max:76    I/O (24Hr):  No intake or output data in the 24 hours ending 24 0643  Objective:  General Appearance:  Comfortable.    Vital signs: (most recent): Blood pressure (!) 142/59, pulse 77, temperature 98.5 °F (36.9 °C), temperature source Oral, resp. rate 16, height 1.651 m (5' 5\"), weight 115.2 kg (253 lb 15.5 oz), SpO2 90 %.  Vital signs are normal.    Lungs:  Normal effort and normal respiratory rate.  Breath sounds clear to auscultation.    Heart: Normal rate.  Regular rhythm.  S1 normal and S2 normal.      Labs/Imaging/Diagnostics    Labs:  CBC:  Recent Labs     24  05   WBC 6.6 4.5   RBC 2.61* 2.32*   HGB 9.5* 8.6*   HCT 28.3* 25.7*   .3* 111.2*   RDW 19.0* 18.8*    143*     CHEMISTRIES:  Recent Labs     24  05    136   K 3.5* 4.2   CL 96* 97*   CO2 28 26   BUN 31* 45*   CREATININE 3.5* 4.7*   GLUCOSE 97 287*   MG 2.3 2.4     PT/INR:  Recent Labs     03/21/24  0026   PROTIME 12.9   INR 0.9     APTT:No results for input(s): \"APTT\" in the last 72 hours.  LIVER PROFILE:  Recent Labs     24  0026 24  0522   AST 19 19   ALT 18 15   BILITOT 0.5 0.5   ALKPHOS 131* 118*       Imaging Last 24 Hours:  XR CHEST PORTABLE    Result Date: 3/21/2024  EXAMINATION: ONE XRAY VIEW OF

## 2024-03-22 NOTE — PLAN OF CARE
Problem: Pain  Goal: Verbalizes/displays adequate comfort level or baseline comfort level  Outcome: Adequate for Discharge     Problem: Discharge Planning  Goal: Discharge to home or other facility with appropriate resources  Outcome: Adequate for Discharge     Problem: ABCDS Injury Assessment  Goal: Absence of physical injury  Outcome: Adequate for Discharge     Problem: Chronic Conditions and Co-morbidities  Goal: Patient's chronic conditions and co-morbidity symptoms are monitored and maintained or improved  Outcome: Adequate for Discharge

## 2024-03-22 NOTE — FLOWSHEET NOTE
03/22/24 1447   Treatment Team Notification   Reason for Communication Review case   Name of Team Member Notified Dr. Landis   Treatment Team Role Consulting Provider   Method of Communication Secure Message   Response No new orders     Okay to discharge per nephro stand-point

## 2024-03-22 NOTE — CARE COORDINATION
ONGOING DISCHARGE PLAN:    Patient is alert and oriented x4.    Spoke with patient regarding discharge plan and patient confirms that plan is still to home with med 1 care    Cardiology consult    Hemodialysis today     Rule out thrombotic microangiopathy check heparin antibodies     Will continue to follow for additional discharge needs.    If patient is discharged prior to next notation, then this note serves as note for discharge by case management.    Electronically signed by Di Mendosa RN on 3/22/2024 at 11:24 AM

## 2024-03-22 NOTE — PLAN OF CARE
Problem: Discharge Planning  Goal: Discharge to home or other facility with appropriate resources  3/22/2024 0046 by Chela Hoskins RN  Outcome: Progressing  Flowsheets (Taken 3/21/2024 2000)  Discharge to home or other facility with appropriate resources:   Identify barriers to discharge with patient and caregiver   Arrange for needed discharge resources and transportation as appropriate   Identify discharge learning needs (meds, wound care, etc)   Refer to discharge planning if patient needs post-hospital services based on physician order or complex needs related to functional status, cognitive ability or social support system     Problem: Pain  Goal: Verbalizes/displays adequate comfort level or baseline comfort level  3/22/2024 0046 by Chela Hoskins RN  Outcome: Progressing     Problem: Safety - Adult  Goal: Free from fall injury  3/22/2024 0046 by Chela Hoskins RN  Outcome: Progressing     Problem: ABCDS Injury Assessment  Goal: Absence of physical injury  3/22/2024 0046 by Chela Hoskins RN  Outcome: Progressing     Problem: Chronic Conditions and Co-morbidities  Goal: Patient's chronic conditions and co-morbidity symptoms are monitored and maintained or improved  Outcome: Progressing  Flowsheets (Taken 3/21/2024 2000)  Care Plan - Patient's Chronic Conditions and Co-Morbidity Symptoms are Monitored and Maintained or Improved:   Monitor and assess patient's chronic conditions and comorbid symptoms for stability, deterioration, or improvement   Collaborate with multidisciplinary team to address chronic and comorbid conditions and prevent exacerbation or deterioration   Update acute care plan with appropriate goals if chronic or comorbid symptoms are exacerbated and prevent overall improvement and discharge

## 2024-03-22 NOTE — PROGRESS NOTES
HEMODIALYSIS PRE-TREATMENT NOTE    Patient Identifiers prior to treatment: NAME  MRN    Isolation Required: yes                      Isolation Type: contact       (please document if patient is being managed as a PUI/COVID-19 patient)        Hepatitis status:                           Date Drawn                             Result  Hepatitis B Surface Antigen 3/8/24     neg                     Hepatitis B Surface Antibody 3/8/24 >1000        Hepatitis B Core Antibody 3/27/23 Non-reactive          How was Hepatitis Status verified: Epic/[facility]     Was a copy of the labs you documented provided to facility for the patient's chart: yes    Hemodialysis orders verified: Yes    Access Within normal limits ( I.e. s/s of infection,...): WNL     Pre-Assessment completed: Yes , Layne Greer Rn    Pre-dialysis report received from: Mariela Youssef Rn                      Time: 849

## 2024-03-22 NOTE — PROGRESS NOTES
HEMODIALYSIS POST TREATMENT NOTE    Treatment time ordered: 180     Actual treatment time: 163    UltraFiltration Goal: 2500  UltraFiltration Removed: 2130      Pre Treatment weight: 113.4  Post Treatment weight: 111.7  Estimated Dry Weight: 112.5    Access used:     Central Venous Catheter:          Tunneled or Non-tunneled: na           Site: na          Access Flow: na      Internal Access:       AV Fistula or AV Graft: AVF         Site: [place]       Access Flow: 450       Sign and symptoms of infection: na       If YES: na    Medications or blood products given: na    Chronic outpatient schedule:     Chronic outpatient unit :    Summary of response to treatment: Pt upset, crying, says shes in pain in her legs, and feet and just wants to be done with tx today, Dr notified of shortened tx of. Blood returned, dialyzer clear, needles pulled, and held 5 minutes each, no prolonged bleeding.     Explain if orders NOT met, was physician notified:yes      ACES flowsheet faxed to patient unit/ placed in patient chart:yes    Post assessment completed: Yes, Percy Tillman Rn.    Report given to: Mariela Youssef Rn      * Intra-treatment documented Safety Checks include the followin) Access and face visible at all times.     2) All connections and blood lines are secure with no kinks.     3) NVL alarm engaged.     4) Hemosafe device applied (if applicable).     5) No collapse of Arterial or Venous blood chambers.     6) All blood lines / pump segments in the air detectors. na

## 2024-03-22 NOTE — PROGRESS NOTES
RN writer spoke with Dr. Landis regarding patient coming off the dialysis machine 28 minutes early due to pain and restlessness. Dr. Landis states that is fine.

## 2024-03-22 NOTE — PROGRESS NOTES
Department of Internal Medicine  Nephrology Cherri Landis MD Progress Note    Reason for consultation: Management of hemodialysis dependent end-stage renal disease.    Consulting physician: Niesha Sunshine MD.      Interval history: Patient was seen and examined today and she is currently chest pain-free.  She denies shortness of breath and does not have nausea or vomiting.    History of present illness: This is a 64 y.o. female with a significant past medical history of systemic hypertension, osteoarthritis,  coronary artery disease s/p CABG with subsequent graft stent,  s/p cerebrovascular accident, type 2 diabetes mellitus and end-stage renal disease secondary to hypertensive and diabetic nephropathy [on routine hemodialysis Monday/Wednesday/Friday at [facility] dialysis unit [facility] urinary care of Dr. Graham using left arm AV fistula], who presented to the emergency department at [facility] for evaluation of chest pain.  There is a recurrent phenomenon with the patient.   Vital signs were stable at presentation.    Scheduled Meds:   epoetin raphael-epbx  3,000 Units SubCUTAneous Once per day on Mon Wed Fri    lidocaine  1 patch TransDERmal Daily    carvedilol  3.125 mg Oral BID    aspirin  81 mg Oral Daily    busPIRone  10 mg Oral TID    gabapentin  300 mg Oral BID    prasugrel  10 mg Oral Daily    atorvastatin  80 mg Oral Nightly    venlafaxine  75 mg Oral Daily with breakfast    docusate sodium  100 mg Oral BID    bumetanide  3 mg Oral BID    allopurinol  100 mg Oral Daily    sodium bicarbonate  1,300 mg Oral BID    potassium chloride  10 mEq Oral BID    amLODIPine  2.5 mg Oral Daily    ferrous sulfate  325 mg Oral Daily    pantoprazole  40 mg Oral Daily    sodium chloride flush  5-40 mL IntraVENous 2 times per day    insulin lispro  0-8 Units SubCUTAneous TID WC    insulin lispro  0-4 Units SubCUTAneous Nightly    insulin glargine  72 Units SubCUTAneous Daily    insulin glargine  42 Units

## 2024-03-22 NOTE — PROGRESS NOTES
03/22/24 1339   Encounter Summary   Encounter Overview/Reason  Volunteer Encounter   Service Provided For: Patient   Referral/Consult From: Nhi   Last Encounter  03/22/24   Complexity of Encounter Low   Rituals, Rites and Sacraments   Type Spiritism Communion   Assessment/Intervention/Outcome   Intervention Prayer (assurance of)/[place]

## 2024-03-24 LAB
MISCELLANEOUS LAB TEST RESULT: NORMAL
TEST NAME: NORMAL

## 2024-03-25 ENCOUNTER — CARE COORDINATION (OUTPATIENT)
Dept: CASE MANAGEMENT | Age: 64
End: 2024-03-25

## 2024-03-25 NOTE — CARE COORDINATION
Care Transitions Outreach Attempt    Call within 2 business days of discharge: Yes   Attempted to reach patient for transitions of care follow up. Unable to reach patient.    Patient: Estefany Garcia Patient : 1960 MRN: [id number]    Last Discharge Facility       Date Complaint Diagnosis Description Type Department Provider    3/21/24 Chest Pain; Hypoglycemia; Dizziness; Nausea Chest pain, unspecified type ED to Hosp-Admission (Discharged) (ADMITTED) Madonna Gamez MD; Tobi...          # 1 attempt-Attempted initial 24 hour hospital follow up call. Left a Hipaa compliant message with name and call back information. Requested return call to [phone].     Was this an external facility discharge? No Discharge Facility Name: [facility]    Noted following upcoming appointments from discharge chart review:   BS follow up appointment(s):   Future Appointments   Date Time Provider Department [place]   2024  7:30 PM MOE SLEEP RM 5 [facility]   2024  3:00 PM [facility]     Non-BS  follow up appointment(s):

## 2024-03-26 ENCOUNTER — CARE COORDINATION (OUTPATIENT)
Dept: CASE MANAGEMENT | Age: 64
End: 2024-03-26

## 2024-03-26 NOTE — CARE COORDINATION
Care Transitions Initial Follow Up Call    Call within 2 business days of discharge: Yes    Patient Current Location:  Home: [address] [place] [id number]    Care Transition Nurse contacted the patient by telephone to perform post hospital discharge assessment. Verified name and  with patient as identifiers. Provided introduction to self, and explanation of the Care Transition Nurse role.     Patient: Estefany Garcia Patient : 1960   MRN: [id number]  Reason for Admission:   Discharge Date: 3/22/24 RARS: Readmission Risk Score: 39.4      Last Discharge Facility       Date Complaint Diagnosis Description Type Department Provider    3/21/24 Chest Pain; Hypoglycemia; Dizziness; Nausea Chest pain, unspecified type ED to Hosp-Admission (Discharged) (ADMITTED) Madonna Gamez MD; Tobi...            Was this an external facility discharge? No Discharge Facility: [facility]    Challenges to be reviewed by the provider   Additional needs identified to be addressed with provider: No  none               Method of communication with provider: none.    Writer spoke to patient, reviewed discharge instructions and medications, 1111F order completed, patient stated she is doing ok, denied any c/o fever, chills, n/v/d, sob or chest pain, still has bilateral feet pain, some dizziness and weakness, patient has vns with [id number], goes to dialysis on , patient hasn't had to much appetite, has been voiding, explained role of cTN, provided contact information, declined RPM, will continue to follow//JU    Care Transition Nurse reviewed discharge instructions, medical action plan, and red flags with patient who verbalized understanding. The patient was given an opportunity to ask questions and does not have any further questions or concerns at this time. Were discharge instructions available to patient? Yes. Reviewed appropriate site of care based on symptoms and resources available to patient including:

## 2024-03-27 LAB
MISCELLANEOUS LAB TEST RESULT: NORMAL
TEST NAME: NORMAL

## 2024-04-03 ENCOUNTER — CARE COORDINATION (OUTPATIENT)
Dept: CASE MANAGEMENT | Age: 64
End: 2024-04-03

## 2024-04-03 NOTE — CARE COORDINATION
Care Transitions Follow Up Call    Patient Current Location:  Home: [address] [place] [id number]    Care Transition Nurse contacted the patient by telephone to follow up after admission on 4/3/24.  Verified name and  with patient as identifiers.    Patient: Estefany Garcia  Patient : 1960   MRN: [id number]  Reason for Admission:   Discharge Date: 3/22/24 RARS: Readmission Risk Score: 39.4      Needs to be reviewed by the provider   Additional needs identified to be addressed with provider: No  none             Method of communication with provider: none.    Writer spoke to patient, she stated she was doing ok, just very tired today, had dialysis today, denied any c/o fever, chills, n/v/d, sob or chest pain at time of call, no new needs at this time, will continue to follow//JU    Addressed changes since last contact:  none  Discussed follow-up appointments. If no appointment was previously scheduled, appointment scheduling offered: Yes.   Is follow up appointment scheduled within 7 days of discharge? .    Follow Up  Future Appointments   Date Time Provider Department Center   2024  3:30 PM STC DIGITAL RM [facility]   2024 10:30 AM Hoda Domínguez APRN - CNP [facility]   2024  7:30 PM ST[facility] SLEEP RM 5 [facility] [place]   2024  2:45 PM [facility]     Care Transitions Subsequent and Final Call    Subsequent and Final Calls  Do you have any ongoing symptoms?: Yes  Onset of Patient-reported symptoms: In the past 7 days  Patient-reported symptoms: Fatigue  Do you currently have any active services?: Yes  Are you currently active with any services?: Outpatient/Community Services  Care Transitions Interventions     Other Therapy Services: Completed     Physical Therapy: Completed Other Services: Completed     Registered Dietician: Completed DME Assistance: Completed    Occupational Therapy: Completed     Disease Association:

## 2024-04-11 ENCOUNTER — CARE COORDINATION (OUTPATIENT)
Dept: CASE MANAGEMENT | Age: 64
End: 2024-04-11

## 2024-04-11 NOTE — CARE COORDINATION
Care Transitions Note    Follow Up Call      Patient Current Location:  Home: [address]  [place] [address]    Care Transition Nurse contacted the patient by telephone. Verified name and  as identifiers.    Additional needs identified to be addressed with provider   No needs identified                 Method of communication with provider: none.    Care Summary Note: Writer spoke to patient, she was doing ok, just got back from dialysis so feeling pretty wiped out, is having extra dialysis this week d/t fluid, denied any c/o fever, chills, n/v/d, sob or chest pain at time of call, reviewed up coming appointments will follow//JU    Addressed changes since last contact:  Community Resources: patient having extra dialysis treatments this week d/t fluid       Advance Care Planning:   Does patient have an Advance Directive: reviewed and current.    Medication Review:  No changes since last call.     Remote Patient Monitoring:  Offered patient enrollment in the Remote Patient Monitoring (RPM) program for in-home monitoring: Patient declined.    Assessments:  Care Transitions ED Follow Up    Care Transitions Interventions     Other Therapy Services: Completed     Physical Therapy: Completed Other Services: Completed     Registered Dietician: Completed DME Assistance: Completed    Occupational Therapy: Completed     Disease Association: Completed  Specialty Service Referral: Completed    Schedule Follow Up Appointment with Physician: Completed  Do you have all of your prescriptions and are they filled?: Yes   Were you discharged with any Home Care or Post Acute Services or do you currently have any active services?: Yes   Post Acute Services: Outpatient/Community Services (Comment: dialysis center)         Patient Home Equipment: Other (Comment: pulse ox)             Follow Up Appointment:   Reviewed upcoming appointment(s).  Future Appointments         Provider Specialty Dept Phone    2024 3:30 PM [facility] [employer]

## 2024-04-16 ENCOUNTER — HOSPITAL ENCOUNTER (OUTPATIENT)
Dept: WOMENS IMAGING | Age: 64
Discharge: HOME | End: 2024-04-18
Payer: COMMERCIAL

## 2024-04-16 DIAGNOSIS — Z12.31 SCREENING MAMMOGRAM FOR HIGH-RISK PATIENT: ICD-10-CM

## 2024-04-16 PROCEDURE — 77063 BREAST TOMOSYNTHESIS BI: CPT

## 2024-04-18 ENCOUNTER — OFFICE VISIT (OUTPATIENT)
Age: 64
End: 2024-04-18
Payer: COMMERCIAL

## 2024-04-18 ENCOUNTER — TELEPHONE (OUTPATIENT)
Age: 64
End: 2024-04-18

## 2024-04-18 VITALS
HEIGHT: 65 IN | WEIGHT: 244 LBS | SYSTOLIC BLOOD PRESSURE: 131 MMHG | OXYGEN SATURATION: 92 % | TEMPERATURE: 97.7 F | HEART RATE: 84 BPM | DIASTOLIC BLOOD PRESSURE: 72 MMHG | BODY MASS INDEX: 40.65 KG/M2

## 2024-04-18 DIAGNOSIS — N18.6 ESRD ON HEMODIALYSIS (HCC): ICD-10-CM

## 2024-04-18 DIAGNOSIS — Z99.2 ESRD ON HEMODIALYSIS (HCC): ICD-10-CM

## 2024-04-18 DIAGNOSIS — Z12.11 SCREENING FOR COLON CANCER: ICD-10-CM

## 2024-04-18 DIAGNOSIS — I63.9 ISCHEMIC STROKE OF FRONTAL LOBE (HCC): ICD-10-CM

## 2024-04-18 DIAGNOSIS — R19.8 IRREGULAR BOWEL HABITS: Primary | ICD-10-CM

## 2024-04-18 DIAGNOSIS — Z95.5 H/O HEART ARTERY STENT: ICD-10-CM

## 2024-04-18 DIAGNOSIS — I50.32 CHRONIC DIASTOLIC CONGESTIVE HEART FAILURE (HCC): ICD-10-CM

## 2024-04-18 DIAGNOSIS — Z79.4 TYPE 2 DIABETES MELLITUS WITH HYPERGLYCEMIA, WITH LONG-TERM CURRENT USE OF INSULIN (HCC): ICD-10-CM

## 2024-04-18 DIAGNOSIS — I50.9 ACUTE ON CHRONIC HEART FAILURE, UNSPECIFIED HEART FAILURE TYPE (HCC): ICD-10-CM

## 2024-04-18 DIAGNOSIS — E11.65 TYPE 2 DIABETES MELLITUS WITH HYPERGLYCEMIA, WITH LONG-TERM CURRENT USE OF INSULIN (HCC): ICD-10-CM

## 2024-04-18 PROCEDURE — G8400 PT W/DXA NO RESULTS DOC: HCPCS | Performed by: NURSE PRACTITIONER

## 2024-04-18 PROCEDURE — 2022F DILAT RTA XM EVC RTNOPTHY: CPT | Performed by: NURSE PRACTITIONER

## 2024-04-18 PROCEDURE — 3017F COLORECTAL CA SCREEN DOC REV: CPT | Performed by: NURSE PRACTITIONER

## 2024-04-18 PROCEDURE — 1090F PRES/ABSN URINE INCON ASSESS: CPT | Performed by: NURSE PRACTITIONER

## 2024-04-18 PROCEDURE — 3075F SYST BP GE 130 - 139MM HG: CPT | Performed by: NURSE PRACTITIONER

## 2024-04-18 PROCEDURE — 1036F TOBACCO NON-USER: CPT | Performed by: NURSE PRACTITIONER

## 2024-04-18 PROCEDURE — 3078F DIAST BP <80 MM HG: CPT | Performed by: NURSE PRACTITIONER

## 2024-04-18 PROCEDURE — 99203 OFFICE O/P NEW LOW 30 MIN: CPT | Performed by: NURSE PRACTITIONER

## 2024-04-18 PROCEDURE — G8427 DOCREV CUR MEDS BY ELIG CLIN: HCPCS | Performed by: NURSE PRACTITIONER

## 2024-04-18 PROCEDURE — G8417 CALC BMI ABV UP PARAM F/U: HCPCS | Performed by: NURSE PRACTITIONER

## 2024-04-18 PROCEDURE — 1123F ACP DISCUSS/DSCN MKR DOCD: CPT | Performed by: NURSE PRACTITIONER

## 2024-04-18 PROCEDURE — 3044F HG A1C LEVEL LT 7.0%: CPT | Performed by: NURSE PRACTITIONER

## 2024-04-18 RX ORDER — BISACODYL 5 MG/1
TABLET, DELAYED RELEASE ORAL
Qty: 4 TABLET | Refills: 0 | Status: SHIPPED | OUTPATIENT
Start: 2024-04-18

## 2024-04-18 RX ORDER — POLYETHYLENE GLYCOL 3350 17 G/17G
POWDER, FOR SOLUTION ORAL
Qty: 238 G | Refills: 0 | Status: SHIPPED | OUTPATIENT
Start: 2024-04-18

## 2024-04-18 ASSESSMENT — ENCOUNTER SYMPTOMS
SORE THROAT: 0
SHORTNESS OF BREATH: 0
RHINORRHEA: 0
COUGH: 0

## 2024-04-18 NOTE — PROGRESS NOTES
anterior or middle cerebral arteries.  No aneurysm.    POSTERIOR CIRCULATION: No significant stenosis of the vertebral, basilar, or  posterior cerebral arteries. No aneurysm.    OTHER: No dural venous sinus thrombosis on this non-dedicated study.    BRAIN: No mass effect or midline shift. No extra-axial fluid collection. The  gray-white differentiation is maintained.    Impression  No large vessel occlusion in the head or neck.      ASSESSMENT   64 y.o. female to be scheduled for colonoscopy, undergoing workup for kidney transplant in the future  Prior colonoscopy, denies any history of polyps  Some irregular bowel habits  Dialysis patient Monday Wednesday Friday  Significant cardiac hx last stent placement about 2 months ago on aspirin and Effient  Cardiologist is Dr. Katz  Per patient and patient's sister who she presents with states that blood sugars have been quite erratic lately, currently over 400 in the office but patient is feeling well without any symptoms, states that PCP who manages diabetes is aware  Major medical hx: Coronary artery disease status postcardiac stents around 6 total stents-last stent placed about 2 months ago, status post CABG x3 2005, stroke, CHF, NSTEMI, HTN, CKD, on dialysis, diabetes, stroke.  Prior ABD/pelvic surgeries: Cholecystectomy, open.  Blood thinning medications: Aspirin, Effient    PLAN  Patient to be scheduled for colonoscopy as detailed below:  I did discuss with both patient and her sister that she would need medical and cardiac clearance prior to colonoscopy which they are agreeable to  I asked her to reach out to her PCP who manages her diabetes to give recommendations related to insulin and colonoscopy prep/day of procedure  I advised patient that she will need to receive dialysis the day before colonoscopy, ensure that nephrologist is aware that she will be undergoing colonoscopy prep and procedure  Prep instructions discussed with patient, copy of instructions

## 2024-04-18 NOTE — TELEPHONE ENCOUNTER
Called patient to schedule a procedure.    TM/SC/  at 10:45 am / ADELINA / ADDIE    MEDICAL CLEARANCE SENT TO DR. WILLINGHAM AND CARDIAC TO DR. MOLINA ON   2024    PATIENT INSTRUCTED TO STOP ASPIRIN X-7 DAYS PRIOR TO PROCEDURE   AND  PLAVIX X-5 DAYS PRIOR TO PROCEDURE    ORDERS PENDING     PATIENT FULLY INSTRUCTED/ MAILED 2024      [facility]   MD Hoda Amezcua, NP  [address]  [address]  [place] [address]  Office:  [phone]  Fax:  [phone]  Miralax (Polyethylene Glycol)  STOP Aspirin 7 Days prior to Procedure  STOP all other Blood Thinners 5 Days prior to Procedure    **DO NOT EAT ANY SOLID FOOD THE DAY BEFORE YOUR PROCEDURE**  **YOU MUST BE ON A CLEAR LIQUID DIET ONLY**    Approved Clear Liquids:  Any flavor of soda except Red or Purple  Fruit Juice without pulp  Coffee or tea without dairy products  Jell-O without fruit or toppings, No Red or Purple  Pop-irma or Italian Ice, No Red or Purple  Chicken or Beef broth and bouillon      DRINK PLENTY OF WATER THROUGHOUT THE DAY    At 10:00 am the day before your procedure, take all 4 Dulcolax Tablets.  At 6:00 pm the day before your procedure, mix the ENTIRE bottle of Miralax (238 gram or Close to it) with 64 ounces of Gatorade (NOT RED or PURPLE).  You must consume the entire 64 ounces by 8:00 pm.  Continue clear liquid diet up until midnight.    NOTHING TO EAT, DRINK, SMOKE, OR CHEW AFTER MIDNIGHT    You may brush your teeth, rinse, gargle, and spit.  Heart or Blood pressure medications ONLY with a small sip of water, unless otherwise directed.  Shower with regular soap and water.    YOU MUST HAVE AN ADULT EITHER DRIVE YOU RO RIDE IN A CAB WITH YOU    MY EXAM IS SCHEDULED FOR:  Date of Procedure: 2024  Time: 10:45 am  Arrival Time:  8:45 am   Location:  [facility]  Your scripts have been sent to:  [facility]  Pre-Testin2024 AT 1:15 PM, a NURSE FROM

## 2024-04-18 NOTE — PATIENT INSTRUCTIONS
[facility]   MD Hoda Amezcua, NP  [address]  [address]  [place] [address]  Office:  [phone]  Fax:  [phone]  Miralax (Polyethylene Glycol)  STOP Aspirin 7 Days prior to Procedure  STOP all other Blood Thinners 5 Days prior to Procedure      **DO NOT EAT ANY SOLID FOOD THE DAY BEFORE YOUR PROCEDURE**  **YOU MUST BE ON A CLEAR LIQUID DIET ONLY**    Approved Clear Liquids:  Any flavor of soda except Red or Purple  Fruit Juice without pulp  Coffee or tea without dairy products  Jell-O without fruit or toppings, No Red or Purple  Pop-irma or Italian Ice, No Red or Purple  Chicken or Beef broth and bouillon      DRINK PLENTY OF WATER THROUGHOUT THE DAY    At 10:00 am the day before your procedure, take all 4 Dulcolax Tablets.  At 6:00 pm the day before your procedure, mix the ENTIRE bottle of Miralax (238 gram or Close to it) with 64 ounces of Gatorade (NOT RED or PURPLE).  You must consume the entire 64 ounces by 8:00 pm.  Continue clear liquid diet up until midnight.    NOTHING TO EAT, DRINK, SMOKE, OR CHEW AFTER MIDNIGHT    You may brush your teeth, rinse, gargle, and spit.  Heart or Blood pressure medications ONLY with a small sip of water, unless otherwise directed.  Shower with regular soap and water.    YOU MUST HAVE AN ADULT EITHER DRIVE YOU RO RIDE IN A CAB WITH YOU

## 2024-04-19 ENCOUNTER — CARE COORDINATION (OUTPATIENT)
Dept: CASE MANAGEMENT | Age: 64
End: 2024-04-19

## 2024-04-19 NOTE — CARE COORDINATION
Care Transitions Outreach Attempt    Call within 2 business days of discharge: Yes   Attempted to reach patient for transitions of care follow up. Unable to reach patient.    Patient: Estefany Garcia Patient : 1960 MRN: [id number]    Last Discharge Facility       Date Complaint Diagnosis Description Type Department Provider    3/21/24 Chest Pain; Hypoglycemia; Dizziness; Nausea Chest pain, unspecified type ED to Hosp-Admission (Discharged) (ADMITTED) Madonna Gamez MD; Tobi...          # 1 attempt-Attempted to reach patient for subsequent call. Left Hipaa appropriate message with contact information requesting return call to [phone]     Was this an external facility discharge? No Discharge Facility Name: [facility]    Noted following upcoming appointments from discharge chart review:   [facility] follow up appointment(s):   Future Appointments   Date Time Provider Department Center   2024  7:30 PM [facility] SLEEP RM 5 [facility] [place]   2024  3:00 PM Bib Marinelli MD [facility] HEART/VAS [facility]   2024  2:45 PM Zulma Payne APRN - CNP [facility]   2024  1:15 PM [facility] PAT RM 4 [facility] PAT [facility]   2024 11:00 AM Hoda Domínguez APRN - CNP [facility]     Non-BS  follow up appointment(s):

## 2024-04-20 ENCOUNTER — HOSPITAL ENCOUNTER (OUTPATIENT)
Dept: SLEEP CENTER | Age: 64
Discharge: HOME | End: 2024-04-22
Payer: COMMERCIAL

## 2024-04-20 VITALS
WEIGHT: 244 LBS | OXYGEN SATURATION: 94 % | HEART RATE: 74 BPM | RESPIRATION RATE: 14 BRPM | BODY MASS INDEX: 40.65 KG/M2 | HEIGHT: 65 IN

## 2024-04-20 PROCEDURE — 95811 POLYSOM 6/>YRS CPAP 4/> PARM: CPT

## 2024-04-20 ASSESSMENT — SLEEP AND FATIGUE QUESTIONNAIRES
HOW LIKELY ARE YOU TO NOD OFF OR FALL ASLEEP IN A CAR, WHILE STOPPED FOR A FEW MINUTES IN TRAFFIC: WOULD NEVER DOZE
HOW LIKELY ARE YOU TO NOD OFF OR FALL ASLEEP WHILE WATCHING TV: SLIGHT CHANCE OF DOZING
HOW LIKELY ARE YOU TO NOD OFF OR FALL ASLEEP WHILE LYING DOWN TO REST IN THE AFTERNOON WHEN CIRCUMSTANCES PERMIT: MODERATE CHANCE OF DOZING
HOW LIKELY ARE YOU TO NOD OFF OR FALL ASLEEP WHILE SITTING QUIETLY AFTER LUNCH WITHOUT ALCOHOL: WOULD NEVER DOZE
HOW LIKELY ARE YOU TO NOD OFF OR FALL ASLEEP WHILE SITTING INACTIVE IN A PUBLIC PLACE: WOULD NEVER DOZE
HOW LIKELY ARE YOU TO NOD OFF OR FALL ASLEEP WHILE SITTING AND READING: SLIGHT CHANCE OF DOZING
ESS TOTAL SCORE: 5
HOW LIKELY ARE YOU TO NOD OFF OR FALL ASLEEP WHILE SITTING AND TALKING TO SOMEONE: WOULD NEVER DOZE
HOW LIKELY ARE YOU TO NOD OFF OR FALL ASLEEP WHEN YOU ARE A PASSENGER IN A CAR FOR AN HOUR WITHOUT A BREAK: SLIGHT CHANCE OF DOZING

## 2024-04-21 NOTE — DISCHARGE INSTRUCTIONS
DME:  [employer]  Mask:  Dreamwear, nasal cushion  Size: Small cushion/ Small frame  EPR: Off

## 2024-04-22 ENCOUNTER — OFFICE VISIT (OUTPATIENT)
Dept: VASCULAR SURGERY | Age: 64
End: 2024-04-22
Payer: COMMERCIAL

## 2024-04-22 ENCOUNTER — TELEPHONE (OUTPATIENT)
Dept: VASCULAR SURGERY | Age: 64
End: 2024-04-22

## 2024-04-22 VITALS
HEART RATE: 70 BPM | OXYGEN SATURATION: 96 % | BODY MASS INDEX: 40.65 KG/M2 | SYSTOLIC BLOOD PRESSURE: 126 MMHG | RESPIRATION RATE: 16 BRPM | DIASTOLIC BLOOD PRESSURE: 71 MMHG | HEIGHT: 65 IN | WEIGHT: 244 LBS

## 2024-04-22 DIAGNOSIS — T82.590A MECHANICAL COMPLICATION OF ARTERIOVENOUS FISTULA SURGICALLY CREATED, INITIAL ENCOUNTER (HCC): Primary | ICD-10-CM

## 2024-04-22 PROCEDURE — 1090F PRES/ABSN URINE INCON ASSESS: CPT | Performed by: SURGERY

## 2024-04-22 PROCEDURE — 1036F TOBACCO NON-USER: CPT | Performed by: SURGERY

## 2024-04-22 PROCEDURE — 99214 OFFICE O/P EST MOD 30 MIN: CPT | Performed by: SURGERY

## 2024-04-22 PROCEDURE — G8400 PT W/DXA NO RESULTS DOC: HCPCS | Performed by: SURGERY

## 2024-04-22 PROCEDURE — G8427 DOCREV CUR MEDS BY ELIG CLIN: HCPCS | Performed by: SURGERY

## 2024-04-22 PROCEDURE — 1123F ACP DISCUSS/DSCN MKR DOCD: CPT | Performed by: SURGERY

## 2024-04-22 PROCEDURE — 3017F COLORECTAL CA SCREEN DOC REV: CPT | Performed by: SURGERY

## 2024-04-22 PROCEDURE — G8417 CALC BMI ABV UP PARAM F/U: HCPCS | Performed by: SURGERY

## 2024-04-22 PROCEDURE — 3078F DIAST BP <80 MM HG: CPT | Performed by: SURGERY

## 2024-04-22 PROCEDURE — 3074F SYST BP LT 130 MM HG: CPT | Performed by: SURGERY

## 2024-04-22 NOTE — PROGRESS NOTES
[facility] HEART AND VASCULAR  [address], [facility] [address]  Dept: [phone]     Patient: Estefany Garcia  : 1960  MRN: [id number]  DOS: 2024    Referring provider:  No ref. provider found         HPI:  Estefany Garcia is a 64 y.o. female who returns to the office for her left upper extremity fistula.  This was a hard foot fistula with the need for superficialization of the cephalic vein in the brachiocephalic fistula setting.  She is dialyzing through the fistula without a significant problem but has some lumps that they were worried about in the upper portion of the brachium.  She is here for evaluation of her fistula.  Her last procedure was arteriography from the groin to evaluate for steal syndrome.  She no longer has hand pain.  Prior to that she had superficialization almost exactly 1 year ago.  Past Medical History:   Diagnosis Date    Arthritis     Backache, unspecified     CAD (coronary artery disease)     Cerebral artery occlusion with cerebral infarction (HCC)     CHF (congestive heart failure) (HCC)     Chronic kidney disease     Coronary atherosclerosis of artery bypass graft     COVID 2022    Cramp of limb     Epistaxis 2023    Gallstones     Hemodialysis patient (HCC)      AND   /   IN [place]    Hyperlipidemia     Hypertension     Insomnia     Neuromuscular disorder (HCC)     Pneumonia     Psychiatric problem     Thyroid disease     Type II or unspecified type diabetes mellitus with renal manifestations, not stated as uncontrolled(250.40)     Type II or unspecified type diabetes mellitus without mention of complication, not stated as uncontrolled     Unspecified vitamin D deficiency      Family History   Problem Relation Age of Onset    Diabetes Father     Heart Failure Father       Social History     Socioeconomic History    Marital status: Single     Spouse name:

## 2024-04-22 NOTE — TELEPHONE ENCOUNTER
Schedule for left AV fistulogram on Thursday to follow my other cases.  30 min local.  [facility]

## 2024-04-22 NOTE — H&P (VIEW-ONLY)
[facility] HEART AND VASCULAR  [address], [facility] [address]  Dept: [phone]     Patient: Estefany Garcia  : 1960  MRN: [id number]  DOS: 2024    Referring provider:  No ref. provider found         HPI:  Estefany Garcia is a 64 y.o. female who returns to the office for her left upper extremity fistula.  This was a hard foot fistula with the need for superficialization of the cephalic vein in the brachiocephalic fistula setting.  She is dialyzing through the fistula without a significant problem but has some lumps that they were worried about in the upper portion of the brachium.  She is here for evaluation of her fistula.  Her last procedure was arteriography from the groin to evaluate for steal syndrome.  She no longer has hand pain.  Prior to that she had superficialization almost exactly 1 year ago.  Past Medical History:   Diagnosis Date    Arthritis     Backache, unspecified     CAD (coronary artery disease)     Cerebral artery occlusion with cerebral infarction (HCC)     CHF (congestive heart failure) (HCC)     Chronic kidney disease     Coronary atherosclerosis of artery bypass graft     COVID 2022    Cramp of limb     Epistaxis 2023    Gallstones     Hemodialysis patient (HCC)      AND   /   IN [place]    Hyperlipidemia     Hypertension     Insomnia     Neuromuscular disorder (HCC)     Pneumonia     Psychiatric problem     Thyroid disease     Type II or unspecified type diabetes mellitus with renal manifestations, not stated as uncontrolled(250.40)     Type II or unspecified type diabetes mellitus without mention of complication, not stated as uncontrolled     Unspecified vitamin D deficiency      Family History   Problem Relation Age of Onset    Diabetes Father     Heart Failure Father       Social History     Socioeconomic History    Marital status: Single     Spouse name:  SURGERY      IR TUNNELED CATHETER PLACEMENT GREATER THAN 5 YEARS  08/18/2021    IR TUNNELED CATHETER PLACEMENT GREATER THAN 5 YEARS 8/18/2021 [facility]    IR TUNNELED CATHETER PLACEMENT GREATER THAN 5 YEARS  03/03/2023    IR TUNNELED CATHETER PLACEMENT GREATER THAN 5 YEARS 3/3/2023 [facility] PROC[facility]    IR TUNNELED CATHETER PLACEMENT GREATER THAN 5 YEARS Right 04/13/2023    DR. SHIN    IR TUNNELED CATHETER PLACEMENT GREATER THAN 5 YEARS  06/07/2023    IR TUNNELED CATHETER PLACEMENT GREATER THAN 5 YEARS 6/7/2023 [facility] PROCED[facility]    IR TUNNELED CATHETER PLACEMENT GREATER THAN 5 YEARS  06/08/2023    IR TUNNELED CATHETER PLACEMENT GREATER THAN 5 YEARS 6/8/2023 [facility] SPECIAL PROCEDURES    KNEE ARTHROSCOPY      right    OTHER SURGICAL HISTORY  04/06/2022    cvc exchange    OTHER SURGICAL HISTORY Left 04/18/2023    AVF REVISION                           DR. SHIN    PTCA  11/15/2023    4 FADIA : 2 FADIA to PDA, 1 FADIA to RPL, 1 FADIA to mid RCA.    TONSILLECTOMY      VASCULAR SURGERY Left 02/08/2024    LUE ARTERIOGRAM                  DR. SHIN    VASCULAR SURGERY Left 02/08/2024    LEFT UPPER EXTREMITY ARTERIOGRAM VIA RIGHT GROIN performed by Bib Shin MD at [facility]      Review of Systems    Vitals:    04/22/24 1507   BP: 126/71   Site: Left Upper Arm   Position: Sitting   Cuff Size: Medium Adult   Pulse: 70   Resp: 16   SpO2: 96%   Weight: 110.7 kg (244 lb)   Height: 1.651 m (5' 5\")          Physical Exam  On examination she has a strongly pulsatile fistula throughout the brachium up into the shoulder.  She has a staccato type bruit at the arterial anastomosis and a pan cardiac cycle bruit at the very proximal cephalic vein as it jumps into the subclavian system.  I still cannot palpate a distal radial pulse.  Her hand is warm and adequately perfused.  Assessment:  1. Mechanical complication of arteriovenous fistula surgically created, initial encounter ([facility])          Plan:  At this

## 2024-04-23 ENCOUNTER — CARE COORDINATION (OUTPATIENT)
Dept: CASE MANAGEMENT | Age: 64
End: 2024-04-23

## 2024-04-23 NOTE — CARE COORDINATION
discharge instructions, medical action plan, and red flags with patient and discussed any barriers to care and/or understanding of plan of care after discharge. Discussed appropriate site of care based on symptoms and resources available to patient including: PCP  Specialist  Urgent care clinics  When to call 911  [employer]. The patient agrees to contact the PCP office for questions related to their healthcare.     Advance Care Planning:   not on file.       Interventions to address risk factors: Scheduled appointment with PCP-4/25/24  and Obtained and reviewed discharge summary and/or continuity of care documents    Offered patient enrollment in the Remote Patient Monitoring (RPM) program for in-home monitoring: Patient declined.     Care Transitions Subsequent and Final Call    Subsequent and Final Calls  Do you have any ongoing symptoms?: No  Have your medications changed?: No  Do you have any questions related to your medications?: No  Do you currently have any active services?: Yes  Are you currently active with any services?: Outpatient/Community Services  Do you have any needs or concerns that I can assist you with?: No  Identified Barriers: None  Care Transitions Interventions     Other Therapy Services: Completed     Physical Therapy: Completed Other Services: Completed     Registered Dietician: Completed DME Assistance: Completed    Occupational Therapy: Completed     Disease Association: Completed  Specialty Service Referral: Completed    Other Interventions:             LPN Care Coordinator provided contact information for future needs. No further follow-up call indicated based on severity of symptoms and risk factors.        Ivonne Clark LPN

## 2024-04-25 ENCOUNTER — HOSPITAL ENCOUNTER (OUTPATIENT)
Age: 64
Setting detail: OUTPATIENT SURGERY
Discharge: HOME | End: 2024-04-25
Attending: SURGERY | Admitting: SURGERY
Payer: COMMERCIAL

## 2024-04-25 VITALS
BODY MASS INDEX: 40.65 KG/M2 | DIASTOLIC BLOOD PRESSURE: 53 MMHG | HEART RATE: 71 BPM | SYSTOLIC BLOOD PRESSURE: 141 MMHG | WEIGHT: 244 LBS | HEIGHT: 65 IN | TEMPERATURE: 98 F | OXYGEN SATURATION: 100 %

## 2024-04-25 PROCEDURE — 6360000004 HC RX CONTRAST MEDICATION: Performed by: SURGERY

## 2024-04-25 PROCEDURE — C1725 CATH, TRANSLUMIN NON-LASER: HCPCS | Performed by: SURGERY

## 2024-04-25 PROCEDURE — C1769 GUIDE WIRE: HCPCS | Performed by: SURGERY

## 2024-04-25 PROCEDURE — 7100000010 HC PHASE II RECOVERY - FIRST 15 MIN: Performed by: SURGERY

## 2024-04-25 PROCEDURE — C1892 INTRO/SHEATH,FIXED,PEEL-AWAY: HCPCS | Performed by: SURGERY

## 2024-04-25 PROCEDURE — 2709999900 HC NON-CHARGEABLE SUPPLY: Performed by: SURGERY

## 2024-04-25 PROCEDURE — 3600000002 HC SURGERY LEVEL 2 BASE: Performed by: SURGERY

## 2024-04-25 PROCEDURE — A4217 STERILE WATER/SALINE, 500 ML: HCPCS | Performed by: SURGERY

## 2024-04-25 PROCEDURE — C1894 INTRO/SHEATH, NON-LASER: HCPCS | Performed by: SURGERY

## 2024-04-25 PROCEDURE — 7100000011 HC PHASE II RECOVERY - ADDTL 15 MIN: Performed by: SURGERY

## 2024-04-25 PROCEDURE — 2580000003 HC RX 258: Performed by: SURGERY

## 2024-04-25 PROCEDURE — 3600000012 HC SURGERY LEVEL 2 ADDTL 15MIN: Performed by: SURGERY

## 2024-04-25 PROCEDURE — 6360000002 HC RX W HCPCS: Performed by: SURGERY

## 2024-04-25 PROCEDURE — 2500000003 HC RX 250 WO HCPCS: Performed by: SURGERY

## 2024-04-25 PROCEDURE — 36902 INTRO CATH DIALYSIS CIRCUIT: CPT | Performed by: SURGERY

## 2024-04-25 RX ORDER — IODIXANOL 320 MG/ML
INJECTION, SOLUTION INTRAVASCULAR
Status: DISCONTINUED
Start: 2024-04-25 | End: 2024-04-25 | Stop reason: HOSPADM

## 2024-04-25 RX ORDER — IODIXANOL 320 MG/ML
INJECTION, SOLUTION INTRAVASCULAR PRN
Status: DISCONTINUED | OUTPATIENT
Start: 2024-04-25 | End: 2024-04-25 | Stop reason: ALTCHOICE

## 2024-04-25 RX ORDER — LIDOCAINE HYDROCHLORIDE 10 MG/ML
INJECTION, SOLUTION EPIDURAL; INFILTRATION; INTRACAUDAL; PERINEURAL PRN
Status: DISCONTINUED | OUTPATIENT
Start: 2024-04-25 | End: 2024-04-25 | Stop reason: ALTCHOICE

## 2024-04-25 RX ORDER — HEPARIN SODIUM 1000 [USP'U]/ML
INJECTION, SOLUTION INTRAVENOUS; SUBCUTANEOUS PRN
Status: DISCONTINUED | OUTPATIENT
Start: 2024-04-25 | End: 2024-04-25 | Stop reason: ALTCHOICE

## 2024-04-25 NOTE — INTERVAL H&P NOTE
Update History & Physical    The patient's History and Physical of April 22,  was reviewed with the patient and I examined the patient. There was no change. The surgical site was confirmed by the patient and me.     Plan: The risks, benefits, expected outcome, and alternative to the recommended procedure have been discussed with the patient. Patient understands and wants to proceed with the procedure.     Electronically signed by Bib Marinelli MD on 4/25/2024 at 4:11 PM

## 2024-04-25 NOTE — PROGRESS NOTES
Admitted to pcc room 14.  Assessment and vitals as charted.  All procedures explained prior to implementation.  Left arm fistula site cleansed wit chlorahexadine wipes in preparation for procedure. Sister in attendance.  Ready for procedure

## 2024-04-30 LAB — STATUS: NORMAL

## 2024-05-01 NOTE — PROGRESS NOTES
Report called to sarah 64 y/o male, Cantonese speaking, POOR HISTORIAN, w/ PMHx HTN, HLD, type 2 DM, CAD (s/p CABG x 2, LIMA-LAD and SVG-RPDA @ [place] in 08/2017 and PCI or PTCA 07/2016 of LM and LAD w/ residual  RCA), hx NSTEMI stage IV CKD (baseline Cr 2.8 from prior outpatient labs), bilateral carotid artery stenosis (known bilateral ICA chronic total occlusion), PAD (s/p multiple prior peripheral caths w/ interventions most recently 5/2022; on Xarleto 2.5 mg BID), ischemic cardiomyopathy (EF 40% by TTE 03/2024), prior CVA (w/ residual left sided weakness, MRI 5/2023 chorinic R frontal loba, R corona radiata and L caudate infarcts), TIA (multiple episodes), CVD (occluded R MCA and suprclinoid L ICA mod R PCA), renal artery stenosis, and ? HIV/Hepatitis (previously on Viread, patient unable to confirm or deny, and has not refilled medication recently) who presented to outpatient cardiologist, Dr. Mario, with c/o chest pain. Pt describes the pain as located in his substernal area, without radiation, pressure-like, gradually worsening, intermittent for the past few weeks, exertional, relieved with rest and precipitated by moderate physical exertion. In light of pts risk factors, CCS class III anginal symptoms and abnormal cath, pt now presents to [facility] for recommended cardiac catheterization with possible intervention if clinically indicated.     -Pt H+H, platelets stable, Pt without any reports of BRBPR, hematuria, prior ICH, melena and no recent or previous GI bleed.   -Loaded with: [ ]81mg ASA, [ ]75mg Plavix,  [x ] ASA 325mg [x ] Brilinta 180mg, [ ] No Load [ ]. full load given as pt poor historian and unsure of meds he takes   -Pt Cr. 3,49 (elevated from Cr 2's in 2022)  - and LVEF 40%, pre cath fluids ordered with additional hydration [x] 250ml IV bolus x 2 over 30 min and 1 hr [ ] 75cc x2hrs, [x ] 50cc x2hrs, Patient euvolemic on exam. ----> Pt Cr. 3,49 (elevated from Cr 2's in 2022) - Fellow aware, advised additional bolus for precath hydration  -Malampatti II  -ASA III    Pt is a Candidate for Moderate Sedation, yes     was used for language Cantonese ID [id number]    Risks & benefits of procedure and alternative therapy have been explained to the patient including but not limited to: allergic reaction, bleeding w/possible need for blood transfusion, infection, renal and vascular compromise, limb damage, arrhythmia, stroke, vessel dissection/perforation, Myocardial infarction, emergent CABG. Informed consent obtained and in chart

## 2024-05-02 ENCOUNTER — HOSPITAL ENCOUNTER (OUTPATIENT)
Dept: PREADMISSION TESTING | Age: 64
Discharge: HOME | End: 2024-05-02

## 2024-05-02 ENCOUNTER — TELEPHONE (OUTPATIENT)
Age: 64
End: 2024-05-02

## 2024-05-02 NOTE — TELEPHONE ENCOUNTER
Called patient and informed she is not her she is not cleared by cardiology for her cscope. Patient is going to call cardiology to ask when she will be able to have the cscope.

## 2024-05-02 NOTE — TELEPHONE ENCOUNTER
Called an canceled patient's procedure. Patient had stent placement in 2/2024 and has to wait 6 months before patient is able to hold any blood thinners, per Dr. Rustam Katz.  Dr. Anaya informed.

## 2024-05-13 ENCOUNTER — OFFICE VISIT (OUTPATIENT)
Dept: VASCULAR SURGERY | Age: 64
End: 2024-05-13
Payer: COMMERCIAL

## 2024-05-13 VITALS
RESPIRATION RATE: 20 BRPM | SYSTOLIC BLOOD PRESSURE: 113 MMHG | BODY MASS INDEX: 40.6 KG/M2 | WEIGHT: 244 LBS | OXYGEN SATURATION: 90 % | TEMPERATURE: 97.9 F | DIASTOLIC BLOOD PRESSURE: 72 MMHG | HEART RATE: 93 BPM

## 2024-05-13 DIAGNOSIS — T82.590D MECHANICAL COMPLICATION OF ARTERIOVENOUS FISTULA SURGICALLY CREATED, SUBSEQUENT ENCOUNTER: Primary | ICD-10-CM

## 2024-05-13 PROCEDURE — G8427 DOCREV CUR MEDS BY ELIG CLIN: HCPCS | Performed by: SURGERY

## 2024-05-13 PROCEDURE — 1123F ACP DISCUSS/DSCN MKR DOCD: CPT | Performed by: SURGERY

## 2024-05-13 PROCEDURE — G8417 CALC BMI ABV UP PARAM F/U: HCPCS | Performed by: SURGERY

## 2024-05-13 PROCEDURE — 1036F TOBACCO NON-USER: CPT | Performed by: SURGERY

## 2024-05-13 PROCEDURE — 3074F SYST BP LT 130 MM HG: CPT | Performed by: SURGERY

## 2024-05-13 PROCEDURE — G8400 PT W/DXA NO RESULTS DOC: HCPCS | Performed by: SURGERY

## 2024-05-13 PROCEDURE — 1090F PRES/ABSN URINE INCON ASSESS: CPT | Performed by: SURGERY

## 2024-05-13 PROCEDURE — 3078F DIAST BP <80 MM HG: CPT | Performed by: SURGERY

## 2024-05-13 PROCEDURE — 3017F COLORECTAL CA SCREEN DOC REV: CPT | Performed by: SURGERY

## 2024-05-13 PROCEDURE — 99213 OFFICE O/P EST LOW 20 MIN: CPT | Performed by: SURGERY

## 2024-05-13 NOTE — PROGRESS NOTES
[facility], [facility] HEART AND VASCULAR  [address] [facility] [facility] [address]  Dept: [phone]     Patient: Estefany Garcia  : 1960  MRN: [id number]  DOS: 2024            HPI:  Estefany Garcia is a 64 y.o. female who returns to the office regarding returns to the office today in follow-up for her fistulogram.  She had a fistulogram in late April for low flow rates within her fistula.  Recall that we had evaluated her for steal syndrome previously as well.  She has patent but small arteries to the level of the hand with an incomplete palmar arch.  She has hand numbness now for many months which is why I evaluated the fistula for steal syndrome.  The flow rates are better according to dialysis now but her hand remains numb and it was quite significant this morning when she woke up.    Review of Systems    Vitals:    24 1503   BP: 113/72   Pulse: 93   Resp: 20   Temp: 97.9 °F (36.6 °C)   SpO2: 90%   Weight: 110.7 kg (244 lb)          Physical Exam  On examination I still feel a thrill however it is weaker than what I remember.  She has no pulsatility within the fistula.  She had a very proximal cephalic vein stenosis as it dumped into the subclavian system.  This was repaired well with balloon angioplasty.  Assessment:  1. Mechanical complication of arteriovenous fistula surgically created, subsequent encounter          Plan:  At this point I am not certain how well the fistula is working and I would like to see her back to the office in another 2 weeks to investigate whether or not the fistula needs further work or replacement.  I would like also to know how her hand is doing in terms of a possible steal syndrome.  She understands and agrees with these plans and we will see her back to the office in 2 weeks    Electronically signed by:  Bib Marinelli MD

## 2024-05-15 NOTE — PROGRESS NOTES
Nephrology Progress Note    Reason for consultation: Management of hemodialysis dependent end-stage renal disease. Consulting physician: Brett Mathews MD    Interval history: Patient continues to have intermittent episodes of confusion but is not in acute distress. She was started on IV cefepime 4/27/2022. She has had episodes of hypotension with blood pressure this morning 92/49 mmHg. She is scheduled for acute hemodialysis today. History of Present Illness: This is a 57 y.o. female with hypertension, type 2 diabetes mellitus, end-stage renal disease on hemodialysis   who was recently taken off dialysis about 6 weeks ago due to regaining residual kidney function. Over the course of the last 2 weeks patient  was restarted on hemodialysis due to worsening fluid overload. Initially had problems with her  tunneled catheter which was replaced last week- patient was dialyzed on Saturday, 4/9/2021 and yesterday-4/13/22  at the outpatient dialysis unit. Patient presented with complaints of shortness of breath over the last 2 to 3 days progressively worsening. Patient had associated  Nausea, chest pain but no vomiting. She is on 3 L nasal cannula chronically at home. Patient was found to be in acute DKA with blood sugars greater than 500 was started on the modified DKA protocol. Blood pressures on admission were elevated above 200s and her chest x-ray showed evidence of pulmonary vascular congestion. Labs showed a potassium of 3.3 with BUN/creatinine of 18 and 1.78 mg/dL, bicarbonate of 18 and troponin of 78. Beta hydroxybutyrate was 7.57.     Objective/     Vitals:    04/28/22 0856 04/28/22 0900 04/28/22 0930 04/28/22 1000   BP: (!) 92/49 (!) 124/48 (!) 116/52 130/68   Pulse: 74 66 67 69   Resp:       Temp:       TempSrc:       SpO2:       Weight:       Height:         24HR INTAKE/OUTPUT:      Intake/Output Summary (Last 24 hours) at 4/28/2022 1017  Last data filed at 4/28/2022 0658  Gross per 24 hour Patient has adamantly denied drinking \"a single drop of alcohol\" today. This nurse is familiar with patient. When asked if he would have any alcohol in his system if we tested his blood, patient states that he may have had one beer today but that is absolutely it. Patient then remembered that his walker was in his car and that he went outside and put it out there.    Intake 450 ml   Output --   Net 450 ml     Patient Vitals for the past 96 hrs (Last 3 readings):   Weight   04/28/22 0300 261 lb 3.9 oz (118.5 kg)   04/26/22 1408 248 lb 0.3 oz (112.5 kg)   04/26/22 1052 251 lb 5.2 oz (114 kg)     Constitutional: Alert and oriented x3  Cardiovascular:  S1, S2 without pericardial rub or gallop. Respiratory: Diminished breath sounds at lung bases but no rhonchi. Abdomen: Full, soft with normal bowel sounds. Extremities: 1+ LE edema    Data/  Recent Labs     04/26/22  0548 04/27/22  0553 04/28/22  0544   WBC 6.3 3.5 4.4   HGB 10.2* 8.6* 8.7*   HCT 31.0* 25.5* 25.9*   MCV 93.7 91.8 92.0    149* 143*     Recent Labs     04/26/22  0548 04/27/22  0553 04/28/22  0544    134* 133*   K 4.3 3.8 3.7   CL 96* 97* 94*   CO2 27 26 27   GLUCOSE 126* 127* 117*   BUN 31* 22 35*   CREATININE 3.40* 2.78* 4.07*   LABGLOM 14* 17* 11*   GFRAA 16* 21* 13*     Assessment/Plan:     1. End-stage renal disease - on hemodialysis by way of a right-sided tunneled catheter, transiently taken off dialysis 6 weeks ago and re- started due to worsening fluid overload 1 week ago. She still has some residual renal function [500 mL urine per day]. We will maintain TTS hemodialysis schedule. Patient will receive acute hemodialysis today  Renal diet,i.e 2-gram sodium,2-gram potassium,1500 ml fluid restriction,1-gram phosphorus, 1800 KCal and 1.2 gram protein per day.     2. Systemic hypertension - She has episodes of hypotension and we will hold carvedilol for systolic blood pressure less than 100 mmHg. We will also decrease furosemide to 40 mg p.o. daily and use midodrine as needed for intradialytic episodes of hypotension.     3. Normocytic anemia of chronic kidney disease - Hemoglobin is 8.7 g/dL today. Continue Retacrit 3000 units subcutaneously 3 times a week.     4.  Mineral and bone disease profile - Serum phosphorus 4.2 mg/dL [4/25/2022] is within target range    Prognosis is guarded.     Sandi Rodriguez FACP  Attending Clinical Nephrologist

## 2024-05-24 ENCOUNTER — OFFICE VISIT (OUTPATIENT)
Dept: VASCULAR SURGERY | Age: 64
End: 2024-05-24
Payer: COMMERCIAL

## 2024-05-24 VITALS
HEIGHT: 65 IN | DIASTOLIC BLOOD PRESSURE: 75 MMHG | RESPIRATION RATE: 18 BRPM | TEMPERATURE: 98 F | SYSTOLIC BLOOD PRESSURE: 123 MMHG | BODY MASS INDEX: 40.65 KG/M2 | WEIGHT: 244 LBS | OXYGEN SATURATION: 90 % | HEART RATE: 82 BPM

## 2024-05-24 DIAGNOSIS — T82.590D MECHANICAL COMPLICATION OF ARTERIOVENOUS FISTULA SURGICALLY CREATED, SUBSEQUENT ENCOUNTER: Primary | ICD-10-CM

## 2024-05-24 PROCEDURE — 3074F SYST BP LT 130 MM HG: CPT | Performed by: SURGERY

## 2024-05-24 PROCEDURE — 1090F PRES/ABSN URINE INCON ASSESS: CPT | Performed by: SURGERY

## 2024-05-24 PROCEDURE — 1123F ACP DISCUSS/DSCN MKR DOCD: CPT | Performed by: SURGERY

## 2024-05-24 PROCEDURE — 3017F COLORECTAL CA SCREEN DOC REV: CPT | Performed by: SURGERY

## 2024-05-24 PROCEDURE — 99213 OFFICE O/P EST LOW 20 MIN: CPT | Performed by: SURGERY

## 2024-05-24 PROCEDURE — 1036F TOBACCO NON-USER: CPT | Performed by: SURGERY

## 2024-05-24 PROCEDURE — 3078F DIAST BP <80 MM HG: CPT | Performed by: SURGERY

## 2024-05-24 PROCEDURE — G8400 PT W/DXA NO RESULTS DOC: HCPCS | Performed by: SURGERY

## 2024-05-24 PROCEDURE — G8427 DOCREV CUR MEDS BY ELIG CLIN: HCPCS | Performed by: SURGERY

## 2024-05-24 PROCEDURE — G8417 CALC BMI ABV UP PARAM F/U: HCPCS | Performed by: SURGERY

## 2024-05-24 NOTE — PROGRESS NOTES
[facility], [facility] HEART AND VASCULAR INSTITUTE  [address]  [place] [address]  Dept: [phone]     Patient: Estefany Garcia  : 1960  MRN: [id number]  DOS: 2024            HPI:  Estefany Garcia is a 64 y.o. female who returns to the office regarding her left upper extremity fistula.  Recall that we have had problems with this fistula from the start.  It is flowing at a rate of 350 on her last dialysis.  She is dialyzing well at this point.  Although the flow rate is slightly low I am willing to accept that.    Review of Systems    Vitals:    24 1523   BP: 123/75   Site: Right Upper Arm   Position: Sitting   Cuff Size: Large Adult   Pulse: 82   Resp: 18   Temp: 98 °F (36.7 °C)   TempSrc: Temporal   SpO2: 90%   Weight: 110.7 kg (244 lb)   Height: 1.651 m (5' 5\")          Physical Exam  On examination she continues to have a palpable pulse in the fistula but still has a good thrill.  Assessment:  1. Mechanical complication of arteriovenous fistula surgically created, subsequent encounter          Plan:  At this point I am willing to watch and wait.  We have gone back and forth to the operating room for fistulogram after fistulogram with no significant improvement.  I think we have to except the fact that she will have a lower flow right and hope that will improve over time.  We will see her back to the office in 1 month to see how things are going.    Electronically signed by:  Bib Marinelli MD

## 2024-05-28 ENCOUNTER — HOSPITAL ENCOUNTER (OUTPATIENT)
Age: 64
Setting detail: SPECIMEN
Discharge: HOME | End: 2024-05-28

## 2024-05-31 LAB
HPV I/H RISK 4 DNA CVX QL NAA+PROBE: NOT DETECTED
HPV SAMPLE: NORMAL
HPV, INTERPRETATION: NORMAL
HPV16 DNA CVX QL NAA+PROBE: NOT DETECTED
HPV18 DNA CVX QL NAA+PROBE: NOT DETECTED
SPECIMEN DESCRIPTION: NORMAL

## 2024-06-09 ENCOUNTER — HOSPITAL ENCOUNTER (INPATIENT)
Age: 64
LOS: 8 days | Discharge: NURSING, REHAB OR LONG-TERM CARE | DRG: 542 | End: 2024-06-17
Attending: EMERGENCY MEDICINE | Admitting: ORTHOPAEDIC SURGERY
Payer: COMMERCIAL

## 2024-06-09 ENCOUNTER — APPOINTMENT (OUTPATIENT)
Dept: GENERAL RADIOLOGY | Age: 64
DRG: 542 | End: 2024-06-09
Payer: COMMERCIAL

## 2024-06-09 ENCOUNTER — APPOINTMENT (OUTPATIENT)
Dept: CT IMAGING | Age: 64
DRG: 542 | End: 2024-06-09
Payer: COMMERCIAL

## 2024-06-09 DIAGNOSIS — S62.101A CLOSED FRACTURE OF RIGHT WRIST, INITIAL ENCOUNTER: Primary | ICD-10-CM

## 2024-06-09 DIAGNOSIS — S52.601A CLOSED FRACTURE OF RIGHT DISTAL RADIUS AND ULNA, INITIAL ENCOUNTER: ICD-10-CM
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
## 2024-06-09 DIAGNOSIS — S52.501A CLOSED FRACTURE OF RIGHT DISTAL RADIUS AND ULNA, INITIAL ENCOUNTER: ICD-10-CM

## 2024-06-09 LAB
ANION GAP SERPL CALCULATED.3IONS-SCNC: 18 MMOL/L (ref 9–17)
BASOPHILS # BLD: 0.08 K/UL (ref 0–0.2)
BASOPHILS NFR BLD: 1 % (ref 0–2)
BUN SERPL-MCNC: 37 MG/DL (ref 8–23)
CALCIUM SERPL-MCNC: 9.4 MG/DL (ref 8.6–10.4)
CHLORIDE SERPL-SCNC: 96 MMOL/L (ref 98–107)
CO2 SERPL-SCNC: 22 MMOL/L (ref 20–31)
CREAT SERPL-MCNC: 4.7 MG/DL (ref 0.5–0.9)
EOSINOPHIL # BLD: 0.24 K/UL (ref 0–0.4)
EOSINOPHILS RELATIVE PERCENT: 3 % (ref 0–4)
ERYTHROCYTE [DISTWIDTH] IN BLOOD BY AUTOMATED COUNT: 15.5 % (ref 11.5–14.9)
GFR, ESTIMATED: 10 ML/MIN/1.73M2
GLUCOSE BLD-MCNC: 143 MG/DL (ref 65–105)
GLUCOSE BLD-MCNC: 227 MG/DL (ref 65–105)
GLUCOSE SERPL-MCNC: 177 MG/DL (ref 70–99)
HCT VFR BLD AUTO: 38.5 % (ref 36–46)
HGB BLD-MCNC: 12.1 G/DL (ref 12–16)
LYMPHOCYTES NFR BLD: 1.19 K/UL (ref 1–4.8)
LYMPHOCYTES RELATIVE PERCENT: 15 % (ref 24–44)
MCH RBC QN AUTO: 36.1 PG (ref 26–34)
MCHC RBC AUTO-ENTMCNC: 31.6 G/DL (ref 31–37)
MCV RBC AUTO: 114.2 FL (ref 80–100)
MONOCYTES NFR BLD: 0.55 K/UL (ref 0.1–1.3)
MONOCYTES NFR BLD: 7 % (ref 1–7)
MORPHOLOGY: ABNORMAL
NEUTROPHILS NFR BLD: 74 % (ref 36–66)
NEUTS SEG NFR BLD: 5.84 K/UL (ref 1.3–9.1)
PLATELET # BLD AUTO: 197 K/UL (ref 150–450)
PMV BLD AUTO: 7.7 FL (ref 6–12)
POTASSIUM SERPL-SCNC: 4.6 MMOL/L (ref 3.7–5.3)
RBC # BLD AUTO: 3.37 M/UL (ref 4–5.2)
SODIUM SERPL-SCNC: 136 MMOL/L (ref 135–144)
WBC OTHER # BLD: 7.9 K/UL (ref 3.5–11)

## 2024-06-09 PROCEDURE — 85025 COMPLETE CBC W/AUTO DIFF WBC: CPT

## 2024-06-09 PROCEDURE — 2580000003 HC RX 258: Performed by: ORTHOPAEDIC SURGERY

## 2024-06-09 PROCEDURE — 80048 BASIC METABOLIC PNL TOTAL CA: CPT

## 2024-06-09 PROCEDURE — 99285 EMERGENCY DEPT VISIT HI MDM: CPT

## 2024-06-09 PROCEDURE — 6370000000 HC RX 637 (ALT 250 FOR IP): Performed by: ORTHOPAEDIC SURGERY

## 2024-06-09 PROCEDURE — 70450 CT HEAD/BRAIN W/O DYE: CPT

## 2024-06-09 PROCEDURE — 73100 X-RAY EXAM OF WRIST: CPT

## 2024-06-09 PROCEDURE — 90715 TDAP VACCINE 7 YRS/> IM: CPT | Performed by: EMERGENCY MEDICINE

## 2024-06-09 PROCEDURE — 1200000000 HC SEMI PRIVATE

## 2024-06-09 PROCEDURE — 73130 X-RAY EXAM OF HAND: CPT

## 2024-06-09 PROCEDURE — 6370000000 HC RX 637 (ALT 250 FOR IP): Performed by: FAMILY MEDICINE

## 2024-06-09 PROCEDURE — 82947 ASSAY GLUCOSE BLOOD QUANT: CPT

## 2024-06-09 PROCEDURE — 73502 X-RAY EXAM HIP UNI 2-3 VIEWS: CPT

## 2024-06-09 PROCEDURE — 36415 COLL VENOUS BLD VENIPUNCTURE: CPT

## 2024-06-09 PROCEDURE — 73090 X-RAY EXAM OF FOREARM: CPT

## 2024-06-09 PROCEDURE — 72125 CT NECK SPINE W/O DYE: CPT

## 2024-06-09 PROCEDURE — 99221 1ST HOSP IP/OBS SF/LOW 40: CPT | Performed by: ORTHOPAEDIC SURGERY

## 2024-06-09 PROCEDURE — 90471 IMMUNIZATION ADMIN: CPT | Performed by: EMERGENCY MEDICINE

## 2024-06-09 PROCEDURE — 6360000002 HC RX W HCPCS: Performed by: EMERGENCY MEDICINE

## 2024-06-09 PROCEDURE — 71045 X-RAY EXAM CHEST 1 VIEW: CPT

## 2024-06-09 PROCEDURE — 96372 THER/PROPH/DIAG INJ SC/IM: CPT

## 2024-06-09 RX ORDER — ASPIRIN 81 MG/1
81 TABLET, CHEWABLE ORAL DAILY
Status: DISCONTINUED | OUTPATIENT
Start: 2024-06-09 | End: 2024-06-17 | Stop reason: HOSPADM

## 2024-06-09 RX ORDER — LANOLIN ALCOHOL/MO/W.PET/CERES
10 CREAM (GRAM) TOPICAL NIGHTLY PRN
Status: DISCONTINUED | OUTPATIENT
Start: 2024-06-09 | End: 2024-06-17 | Stop reason: HOSPADM

## 2024-06-09 RX ORDER — NITROGLYCERIN 0.4 MG/1
0.4 TABLET SUBLINGUAL EVERY 5 MIN PRN
Status: DISCONTINUED | OUTPATIENT
Start: 2024-06-09 | End: 2024-06-17 | Stop reason: HOSPADM

## 2024-06-09 RX ORDER — BUMETANIDE 1 MG/1
3 TABLET ORAL 2 TIMES DAILY
Status: DISCONTINUED | OUTPATIENT
Start: 2024-06-09 | End: 2024-06-17 | Stop reason: HOSPADM

## 2024-06-09 RX ORDER — MORPHINE SULFATE 2 MG/ML
2 INJECTION, SOLUTION INTRAMUSCULAR; INTRAVENOUS ONCE
Status: DISCONTINUED | OUTPATIENT
Start: 2024-06-09 | End: 2024-06-09

## 2024-06-09 RX ORDER — SODIUM CHLORIDE 0.9 % (FLUSH) 0.9 %
5-40 SYRINGE (ML) INJECTION EVERY 12 HOURS SCHEDULED
Status: DISCONTINUED | OUTPATIENT
Start: 2024-06-09 | End: 2024-06-17 | Stop reason: HOSPADM

## 2024-06-09 RX ORDER — VENLAFAXINE HYDROCHLORIDE 75 MG/1
75 CAPSULE, EXTENDED RELEASE ORAL
Status: DISCONTINUED | OUTPATIENT
Start: 2024-06-10 | End: 2024-06-17 | Stop reason: HOSPADM

## 2024-06-09 RX ORDER — SODIUM CHLORIDE 9 MG/ML
INJECTION, SOLUTION INTRAVENOUS PRN
Status: DISCONTINUED | OUTPATIENT
Start: 2024-06-09 | End: 2024-06-17 | Stop reason: HOSPADM

## 2024-06-09 RX ORDER — INSULIN LISPRO 100 [IU]/ML
0-4 INJECTION, SOLUTION INTRAVENOUS; SUBCUTANEOUS NIGHTLY
Status: DISCONTINUED | OUTPATIENT
Start: 2024-06-09 | End: 2024-06-17 | Stop reason: HOSPADM

## 2024-06-09 RX ORDER — INSULIN GLARGINE 100 [IU]/ML
42 INJECTION, SOLUTION SUBCUTANEOUS NIGHTLY
Status: DISCONTINUED | OUTPATIENT
Start: 2024-06-09 | End: 2024-06-17 | Stop reason: HOSPADM

## 2024-06-09 RX ORDER — CYCLOBENZAPRINE HCL 10 MG
5 TABLET ORAL 3 TIMES DAILY PRN
Status: DISCONTINUED | OUTPATIENT
Start: 2024-06-09 | End: 2024-06-17 | Stop reason: HOSPADM

## 2024-06-09 RX ORDER — MIDODRINE HYDROCHLORIDE 5 MG/1
5 TABLET ORAL PRN
Status: DISCONTINUED | OUTPATIENT
Start: 2024-06-09 | End: 2024-06-17 | Stop reason: HOSPADM

## 2024-06-09 RX ORDER — SODIUM CHLORIDE 0.9 % (FLUSH) 0.9 %
5-40 SYRINGE (ML) INJECTION PRN
Status: DISCONTINUED | OUTPATIENT
Start: 2024-06-09 | End: 2024-06-17 | Stop reason: HOSPADM

## 2024-06-09 RX ORDER — CARVEDILOL 3.12 MG/1
3.12 TABLET ORAL 2 TIMES DAILY
Status: DISCONTINUED | OUTPATIENT
Start: 2024-06-09 | End: 2024-06-17 | Stop reason: HOSPADM

## 2024-06-09 RX ORDER — SODIUM BICARBONATE 650 MG/1
1300 TABLET ORAL 2 TIMES DAILY
Status: DISCONTINUED | OUTPATIENT
Start: 2024-06-09 | End: 2024-06-17 | Stop reason: HOSPADM

## 2024-06-09 RX ORDER — FERROUS SULFATE 325(65) MG
325 TABLET ORAL DAILY
Status: DISCONTINUED | OUTPATIENT
Start: 2024-06-09 | End: 2024-06-17 | Stop reason: HOSPADM

## 2024-06-09 RX ORDER — POLYETHYLENE GLYCOL 3350 17 G/17G
17 POWDER, FOR SOLUTION ORAL DAILY PRN
Status: DISCONTINUED | OUTPATIENT
Start: 2024-06-09 | End: 2024-06-17 | Stop reason: HOSPADM

## 2024-06-09 RX ORDER — ALLOPURINOL 100 MG/1
100 TABLET ORAL DAILY
Status: DISCONTINUED | OUTPATIENT
Start: 2024-06-09 | End: 2024-06-17 | Stop reason: HOSPADM

## 2024-06-09 RX ORDER — ATORVASTATIN CALCIUM 80 MG/1
80 TABLET, FILM COATED ORAL NIGHTLY
Status: DISCONTINUED | OUTPATIENT
Start: 2024-06-09 | End: 2024-06-17 | Stop reason: HOSPADM

## 2024-06-09 RX ORDER — INSULIN LISPRO 100 [IU]/ML
0-8 INJECTION, SOLUTION INTRAVENOUS; SUBCUTANEOUS
Status: DISCONTINUED | OUTPATIENT
Start: 2024-06-09 | End: 2024-06-17 | Stop reason: HOSPADM

## 2024-06-09 RX ORDER — HYDROCODONE BITARTRATE AND ACETAMINOPHEN 5; 325 MG/1; MG/1
1 TABLET ORAL EVERY 4 HOURS PRN
Status: DISCONTINUED | OUTPATIENT
Start: 2024-06-09 | End: 2024-06-17 | Stop reason: HOSPADM

## 2024-06-09 RX ORDER — DOCUSATE SODIUM 100 MG/1
100 CAPSULE, LIQUID FILLED ORAL 2 TIMES DAILY
Status: DISCONTINUED | OUTPATIENT
Start: 2024-06-09 | End: 2024-06-17 | Stop reason: HOSPADM

## 2024-06-09 RX ORDER — INSULIN GLARGINE 100 [IU]/ML
72 INJECTION, SOLUTION SUBCUTANEOUS EVERY MORNING
Status: DISCONTINUED | OUTPATIENT
Start: 2024-06-10 | End: 2024-06-17 | Stop reason: HOSPADM

## 2024-06-09 RX ORDER — ACETAMINOPHEN 325 MG/1
650 TABLET ORAL EVERY 6 HOURS PRN
Status: DISCONTINUED | OUTPATIENT
Start: 2024-06-09 | End: 2024-06-17 | Stop reason: HOSPADM

## 2024-06-09 RX ORDER — MORPHINE SULFATE 2 MG/ML
2 INJECTION, SOLUTION INTRAMUSCULAR; INTRAVENOUS ONCE
Status: COMPLETED | OUTPATIENT
Start: 2024-06-09 | End: 2024-06-09

## 2024-06-09 RX ORDER — INSULIN LISPRO 100 [IU]/ML
20 INJECTION, SOLUTION INTRAVENOUS; SUBCUTANEOUS
Status: DISCONTINUED | OUTPATIENT
Start: 2024-06-09 | End: 2024-06-17 | Stop reason: HOSPADM

## 2024-06-09 RX ORDER — SEVELAMER CARBONATE 800 MG/1
1600 TABLET, FILM COATED ORAL
Status: DISCONTINUED | OUTPATIENT
Start: 2024-06-09 | End: 2024-06-17 | Stop reason: HOSPADM

## 2024-06-09 RX ORDER — LANOLIN ALCOHOL/MO/W.PET/CERES
400 CREAM (GRAM) TOPICAL DAILY
Status: DISCONTINUED | OUTPATIENT
Start: 2024-06-09 | End: 2024-06-17 | Stop reason: HOSPADM

## 2024-06-09 RX ORDER — ONDANSETRON 2 MG/ML
4 INJECTION INTRAMUSCULAR; INTRAVENOUS EVERY 6 HOURS PRN
Status: DISCONTINUED | OUTPATIENT
Start: 2024-06-09 | End: 2024-06-17 | Stop reason: HOSPADM

## 2024-06-09 RX ORDER — POTASSIUM CHLORIDE 750 MG/1
10 CAPSULE, EXTENDED RELEASE ORAL 2 TIMES DAILY
Status: DISCONTINUED | OUTPATIENT
Start: 2024-06-09 | End: 2024-06-17 | Stop reason: HOSPADM

## 2024-06-09 RX ORDER — BUSPIRONE HYDROCHLORIDE 10 MG/1
10 TABLET ORAL 3 TIMES DAILY
Status: DISCONTINUED | OUTPATIENT
Start: 2024-06-09 | End: 2024-06-17 | Stop reason: HOSPADM

## 2024-06-09 RX ORDER — CALCIUM POLYCARBOPHIL 625 MG 625 MG/1
1250 TABLET ORAL 2 TIMES DAILY
Status: DISCONTINUED | OUTPATIENT
Start: 2024-06-09 | End: 2024-06-17 | Stop reason: HOSPADM

## 2024-06-09 RX ORDER — POLYVINYL ALCOHOL 14 MG/ML
1 SOLUTION/ DROPS OPHTHALMIC PRN
Status: DISCONTINUED | OUTPATIENT
Start: 2024-06-09 | End: 2024-06-17 | Stop reason: HOSPADM

## 2024-06-09 RX ORDER — ONDANSETRON 4 MG/1
4 TABLET, ORALLY DISINTEGRATING ORAL EVERY 8 HOURS PRN
Status: DISCONTINUED | OUTPATIENT
Start: 2024-06-09 | End: 2024-06-17 | Stop reason: HOSPADM

## 2024-06-09 RX ORDER — PRASUGREL 10 MG/1
10 TABLET, FILM COATED ORAL DAILY
Status: DISCONTINUED | OUTPATIENT
Start: 2024-06-09 | End: 2024-06-17 | Stop reason: HOSPADM

## 2024-06-09 RX ADMIN — ALLOPURINOL 100 MG: 100 TABLET ORAL at 16:15

## 2024-06-09 RX ADMIN — HYDROCODONE BITARTRATE AND ACETAMINOPHEN 1 TABLET: 5; 325 TABLET ORAL at 22:45

## 2024-06-09 RX ADMIN — INSULIN LISPRO 20 UNITS: 100 INJECTION, SOLUTION INTRAVENOUS; SUBCUTANEOUS at 17:05

## 2024-06-09 RX ADMIN — DOCUSATE SODIUM 100 MG: 100 CAPSULE, LIQUID FILLED ORAL at 21:08

## 2024-06-09 RX ADMIN — POTASSIUM CHLORIDE 10 MEQ: 750 CAPSULE, EXTENDED RELEASE ORAL at 16:14

## 2024-06-09 RX ADMIN — INSULIN GLARGINE 42 UNITS: 100 INJECTION, SOLUTION SUBCUTANEOUS at 21:09

## 2024-06-09 RX ADMIN — CALCIUM POLYCARBOPHIL 1250 MG: 625 TABLET ORAL at 16:37

## 2024-06-09 RX ADMIN — ATORVASTATIN CALCIUM 80 MG: 80 TABLET, FILM COATED ORAL at 21:08

## 2024-06-09 RX ADMIN — MORPHINE SULFATE 2 MG: 2 INJECTION, SOLUTION INTRAMUSCULAR; INTRAVENOUS at 11:20

## 2024-06-09 RX ADMIN — ACETAMINOPHEN 650 MG: 325 TABLET ORAL at 15:07

## 2024-06-09 RX ADMIN — BUMETANIDE 3 MG: 1 TABLET ORAL at 16:14

## 2024-06-09 RX ADMIN — SEVELAMER CARBONATE 1600 MG: 800 TABLET, FILM COATED ORAL at 16:14

## 2024-06-09 RX ADMIN — ACETAMINOPHEN 650 MG: 325 TABLET ORAL at 21:07

## 2024-06-09 RX ADMIN — Medication 400 MG: at 21:08

## 2024-06-09 RX ADMIN — SODIUM CHLORIDE, PRESERVATIVE FREE 10 ML: 5 INJECTION INTRAVENOUS at 21:15

## 2024-06-09 RX ADMIN — BUSPIRONE HYDROCHLORIDE 10 MG: 10 TABLET ORAL at 21:08

## 2024-06-09 RX ADMIN — DOCUSATE SODIUM 100 MG: 100 CAPSULE, LIQUID FILLED ORAL at 16:14

## 2024-06-09 RX ADMIN — SODIUM BICARBONATE 1300 MG: 650 TABLET ORAL at 21:08

## 2024-06-09 RX ADMIN — ASPIRIN 81 MG 81 MG: 81 TABLET ORAL at 16:14

## 2024-06-09 RX ADMIN — BUMETANIDE 3 MG: 1 TABLET ORAL at 21:08

## 2024-06-09 RX ADMIN — TETANUS TOXOID, REDUCED DIPHTHERIA TOXOID AND ACELLULAR PERTUSSIS VACCINE, ADSORBED 0.5 ML: 5; 2.5; 8; 8; 2.5 SUSPENSION INTRAMUSCULAR at 08:53

## 2024-06-09 RX ADMIN — CARVEDILOL 3.12 MG: 3.12 TABLET, FILM COATED ORAL at 16:14

## 2024-06-09 RX ADMIN — BUSPIRONE HYDROCHLORIDE 10 MG: 10 TABLET ORAL at 16:14

## 2024-06-09 RX ADMIN — CALCIUM POLYCARBOPHIL 1250 MG: 625 TABLET ORAL at 21:11

## 2024-06-09 RX ADMIN — Medication 10.5 MG: at 22:38

## 2024-06-09 RX ADMIN — POTASSIUM CHLORIDE 10 MEQ: 750 CAPSULE, EXTENDED RELEASE ORAL at 21:08

## 2024-06-09 RX ADMIN — SODIUM BICARBONATE 1300 MG: 650 TABLET ORAL at 16:14

## 2024-06-09 RX ADMIN — CARVEDILOL 3.12 MG: 3.12 TABLET, FILM COATED ORAL at 21:08

## 2024-06-09 RX ADMIN — MORPHINE SULFATE 2 MG: 2 INJECTION, SOLUTION INTRAMUSCULAR; INTRAVENOUS at 08:53

## 2024-06-09 RX ADMIN — PRASUGREL 10 MG: 10 TABLET, FILM COATED ORAL at 16:37

## 2024-06-09 ASSESSMENT — PAIN DESCRIPTION - DESCRIPTORS
DESCRIPTORS: ACHING;CRAMPING
DESCRIPTORS: THROBBING
DESCRIPTORS: STABBING
DESCRIPTORS: ACHING;DISCOMFORT
DESCRIPTORS: THROBBING
DESCRIPTORS: THROBBING

## 2024-06-09 ASSESSMENT — PAIN DESCRIPTION - LOCATION
LOCATION: HAND
LOCATION: ARM;WRIST
LOCATION: WRIST
LOCATION: ARM;FINGER (COMMENT WHICH ONE)
LOCATION: ARM;WRIST
LOCATION: HAND
LOCATION: ARM;WRIST
LOCATION: WRIST

## 2024-06-09 ASSESSMENT — PAIN DESCRIPTION - PAIN TYPE
TYPE: ACUTE PAIN

## 2024-06-09 ASSESSMENT — PAIN SCALES - GENERAL
PAINLEVEL_OUTOF10: 9
PAINLEVEL_OUTOF10: 9
PAINLEVEL_OUTOF10: 8
PAINLEVEL_OUTOF10: 9
PAINLEVEL_OUTOF10: 8
PAINLEVEL_OUTOF10: 8
PAINLEVEL_OUTOF10: 0
PAINLEVEL_OUTOF10: 8
PAINLEVEL_OUTOF10: 10
PAINLEVEL_OUTOF10: 9

## 2024-06-09 ASSESSMENT — PAIN DESCRIPTION - ORIENTATION
ORIENTATION: RIGHT

## 2024-06-09 ASSESSMENT — PAIN - FUNCTIONAL ASSESSMENT
PAIN_FUNCTIONAL_ASSESSMENT: ACTIVITIES ARE NOT PREVENTED
PAIN_FUNCTIONAL_ASSESSMENT: 0-10
PAIN_FUNCTIONAL_ASSESSMENT: PREVENTS OR INTERFERES WITH ALL ACTIVE AND SOME PASSIVE ACTIVITIES
PAIN_FUNCTIONAL_ASSESSMENT: 0-10

## 2024-06-09 NOTE — H&P
ORTHOPEDIC PATIENT EVALUATION      HPI / Chief Complaint  Estefany Garcia is a 64 y.o. right hand dominant female who presented to the ED this AM (6/9/24) after she fell out of bed hurting her right wrist. She was unable to get up after the fall and was brought in by EMS. Pain is primarily localized to her right wrist, shoulder and her left arm fistula. She was diagnosed with a buckle fracture of her right wrist and is being admitted due to her inability to care for herself. She lives alone, ambulates with a walker.     Past Medical History  Estefany  has a past medical history of Arthritis, Backache, unspecified, CAD (coronary artery disease), Cerebral artery occlusion with cerebral infarction (HCC), CHF (congestive heart failure) ([facility]), Chronic kidney disease, Coronary atherosclerosis of artery bypass graft, COVID, Cramp of limb, Epistaxis, Gallstones, Hemodialysis patient (HCC), Hyperlipidemia, Hypertension, Insomnia, Neuromuscular disorder (HCC), Pneumonia, Psychiatric problem, Thyroid disease, Type II or unspecified type diabetes mellitus with renal manifestations, not stated as uncontrolled(250.40), Type II or unspecified type diabetes mellitus without mention of complication, not stated as uncontrolled, and Unspecified vitamin D deficiency.    Past Surgical History  Estefany  has a past surgical history that includes Coronary artery bypass graft (02/2005); Knee arthroscopy; Carpal tunnel release; Breast surgery; Tonsillectomy; Hand surgery; Ankle fracture surgery; Cholecystectomy, open (N/A); IR TUNNELED CVC PLACE WO SQ PORT/PUMP > 5 YEARS (08/18/2021); AV fistula creation (12/14/2021); Dialysis fistula creation (Left, 12/14/2021); other surgical history (04/06/2022); back surgery; Colonoscopy; eye surgery; fracture surgery; Cardiac surgery; IR TUNNELED CVC PLACE WO SQ PORT/PUMP > 5 YEARS (03/03/2023); IR TUNNELED CVC PLACE WO SQ PORT/PUMP > 5 YEARS (Right, 04/13/2023); Dialysis Catheter Insertion (Right,

## 2024-06-09 NOTE — CARE COORDINATION
Pt arrived to med c room 2038. A/o x4. Call light and table within reach. Bed alarm on. VSS. Pt resting comfortably in bed.

## 2024-06-09 NOTE — ED PROVIDER NOTES
EMERGENCY DEPARTMENT ENCOUNTER    Pt Name: Estefany Garcia  MRN: [id number]  Birthdate 1960  Date of evaluation: 6/9/24  CHIEF COMPLAINT       Chief Complaint   Patient presents with    Fall    Wrist Pain     Right      HISTORY OF PRESENT ILLNESS   64-year-old female presenting to the ER after a fall.  Patient was reaching over from her bed when she accidentally Going and landed on her right side.  Patient injured her right upper extremity as well as her right hip in the process.  Patient does admit to the use of blood thinners.    The history is provided by the patient.   Fall  The accident occurred Less than 1 hour ago. The fall occurred from a bed. Pertinent negatives include no abdominal pain, no nausea, no vomiting and no headaches.           REVIEW OF SYSTEMS     Review of Systems   Constitutional:  Negative for activity change, appetite change and fatigue.   HENT:  Negative for facial swelling, trouble swallowing and voice change.    Eyes:  Negative for photophobia and pain.   Respiratory:  Negative for chest tightness and shortness of breath.    Cardiovascular:  Negative for chest pain and palpitations.   Gastrointestinal:  Negative for abdominal pain, nausea and vomiting.   Genitourinary:  Negative for dysuria and urgency.   Musculoskeletal:  Positive for arthralgias (R hip and RUE). Negative for back pain.   Skin:  Negative for color change and rash.   Neurological:  Negative for dizziness, syncope and headaches.   Psychiatric/Behavioral:  Negative for behavioral problems and hallucinations.      PASTMEDICAL HISTORY     Past Medical History:   Diagnosis Date    Arthritis     Backache, unspecified     CAD (coronary artery disease)     Cerebral artery occlusion with cerebral infarction (HCC)     CHF (congestive heart failure) (HCC)     Chronic kidney disease     Coronary atherosclerosis of artery bypass graft     COVID 01/31/2022    Cramp of limb     Epistaxis 03/05/2023    Gallstones     Hemodialysis

## 2024-06-10 LAB
ALBUMIN: 3.5 G/DL (ref 3.5–5.2)
ANION GAP SERPL CALCULATED.3IONS-SCNC: 15 MMOL/L (ref 9–17)
BUN SERPL-MCNC: 50 MG/DL (ref 8–23)
CALCIUM SERPL-MCNC: 9.7 MG/DL (ref 8.6–10.4)
CHLORIDE SERPL-SCNC: 94 MMOL/L (ref 98–107)
CO2 SERPL-SCNC: 27 MMOL/L (ref 20–31)
CREAT SERPL-MCNC: 5.4 MG/DL (ref 0.5–0.9)
CYTOLOGY REPORT: NORMAL
GFR, ESTIMATED: 8 ML/MIN/1.73M2
GLUCOSE BLD-MCNC: 122 MG/DL (ref 65–105)
GLUCOSE BLD-MCNC: 154 MG/DL (ref 65–105)
GLUCOSE BLD-MCNC: 155 MG/DL (ref 65–105)
GLUCOSE BLD-MCNC: 228 MG/DL (ref 65–105)
GLUCOSE SERPL-MCNC: 347 MG/DL (ref 70–99)
HCT VFR BLD AUTO: 32.1 % (ref 36–46)
HGB BLD-MCNC: 10.6 G/DL (ref 12–16)
PHOSPHATE SERPL-MCNC: 3.9 MG/DL (ref 2.6–4.5)
POTASSIUM SERPL-SCNC: 4.3 MMOL/L (ref 3.7–5.3)
SODIUM SERPL-SCNC: 136 MMOL/L (ref 135–144)

## 2024-06-10 PROCEDURE — 2580000003 HC RX 258: Performed by: ORTHOPAEDIC SURGERY

## 2024-06-10 PROCEDURE — 1200000000 HC SEMI PRIVATE

## 2024-06-10 PROCEDURE — 97166 OT EVAL MOD COMPLEX 45 MIN: CPT

## 2024-06-10 PROCEDURE — 97530 THERAPEUTIC ACTIVITIES: CPT

## 2024-06-10 PROCEDURE — 6370000000 HC RX 637 (ALT 250 FOR IP): Performed by: FAMILY MEDICINE

## 2024-06-10 PROCEDURE — 85018 HEMOGLOBIN: CPT

## 2024-06-10 PROCEDURE — 99222 1ST HOSP IP/OBS MODERATE 55: CPT | Performed by: FAMILY MEDICINE

## 2024-06-10 PROCEDURE — 36415 COLL VENOUS BLD VENIPUNCTURE: CPT

## 2024-06-10 PROCEDURE — 82947 ASSAY GLUCOSE BLOOD QUANT: CPT

## 2024-06-10 PROCEDURE — 80069 RENAL FUNCTION PANEL: CPT

## 2024-06-10 PROCEDURE — 6360000002 HC RX W HCPCS: Performed by: FAMILY MEDICINE

## 2024-06-10 PROCEDURE — 5A1D70Z PERFORMANCE OF URINARY FILTRATION, INTERMITTENT, LESS THAN 6 HOURS PER DAY: ICD-10-PCS | Performed by: ORTHOPAEDIC SURGERY

## 2024-06-10 PROCEDURE — 90935 HEMODIALYSIS ONE EVALUATION: CPT

## 2024-06-10 PROCEDURE — 85014 HEMATOCRIT: CPT

## 2024-06-10 PROCEDURE — 6370000000 HC RX 637 (ALT 250 FOR IP): Performed by: ORTHOPAEDIC SURGERY

## 2024-06-10 PROCEDURE — 97162 PT EVAL MOD COMPLEX 30 MIN: CPT

## 2024-06-10 RX ORDER — HEPARIN SODIUM 5000 [USP'U]/ML
5000 INJECTION, SOLUTION INTRAVENOUS; SUBCUTANEOUS EVERY 8 HOURS SCHEDULED
Status: DISCONTINUED | OUTPATIENT
Start: 2024-06-10 | End: 2024-06-17 | Stop reason: HOSPADM

## 2024-06-10 RX ORDER — HYDROCODONE BITARTRATE AND ACETAMINOPHEN 5; 325 MG/1; MG/1
1 TABLET ORAL EVERY 4 HOURS PRN
Qty: 28 TABLET | Refills: 0 | Status: CANCELLED | OUTPATIENT
Start: 2024-06-10 | End: 2024-06-17

## 2024-06-10 RX ADMIN — POTASSIUM CHLORIDE 10 MEQ: 750 CAPSULE, EXTENDED RELEASE ORAL at 09:13

## 2024-06-10 RX ADMIN — CALCIUM POLYCARBOPHIL 1250 MG: 625 TABLET ORAL at 09:15

## 2024-06-10 RX ADMIN — INSULIN LISPRO 20 UNITS: 100 INJECTION, SOLUTION INTRAVENOUS; SUBCUTANEOUS at 14:09

## 2024-06-10 RX ADMIN — BUSPIRONE HYDROCHLORIDE 10 MG: 10 TABLET ORAL at 21:25

## 2024-06-10 RX ADMIN — POTASSIUM CHLORIDE 10 MEQ: 750 CAPSULE, EXTENDED RELEASE ORAL at 21:25

## 2024-06-10 RX ADMIN — ATORVASTATIN CALCIUM 80 MG: 80 TABLET, FILM COATED ORAL at 21:25

## 2024-06-10 RX ADMIN — BUSPIRONE HYDROCHLORIDE 10 MG: 10 TABLET ORAL at 09:13

## 2024-06-10 RX ADMIN — HYDROCODONE BITARTRATE AND ACETAMINOPHEN 1 TABLET: 5; 325 TABLET ORAL at 14:58

## 2024-06-10 RX ADMIN — SEVELAMER CARBONATE 1600 MG: 800 TABLET, FILM COATED ORAL at 14:09

## 2024-06-10 RX ADMIN — ASPIRIN 81 MG 81 MG: 81 TABLET ORAL at 09:14

## 2024-06-10 RX ADMIN — DOCUSATE SODIUM 100 MG: 100 CAPSULE, LIQUID FILLED ORAL at 09:14

## 2024-06-10 RX ADMIN — ALLOPURINOL 100 MG: 100 TABLET ORAL at 09:13

## 2024-06-10 RX ADMIN — HEPARIN SODIUM 5000 UNITS: 5000 INJECTION INTRAVENOUS; SUBCUTANEOUS at 14:09

## 2024-06-10 RX ADMIN — INSULIN GLARGINE 42 UNITS: 100 INJECTION, SOLUTION SUBCUTANEOUS at 21:24

## 2024-06-10 RX ADMIN — SODIUM BICARBONATE 1300 MG: 650 TABLET ORAL at 21:25

## 2024-06-10 RX ADMIN — Medication 400 MG: at 21:25

## 2024-06-10 RX ADMIN — INSULIN LISPRO 20 UNITS: 100 INJECTION, SOLUTION INTRAVENOUS; SUBCUTANEOUS at 09:14

## 2024-06-10 RX ADMIN — INSULIN LISPRO 20 UNITS: 100 INJECTION, SOLUTION INTRAVENOUS; SUBCUTANEOUS at 17:02

## 2024-06-10 RX ADMIN — HEPARIN SODIUM 5000 UNITS: 5000 INJECTION INTRAVENOUS; SUBCUTANEOUS at 21:24

## 2024-06-10 RX ADMIN — CALCIUM POLYCARBOPHIL 1250 MG: 625 TABLET ORAL at 21:28

## 2024-06-10 RX ADMIN — SEVELAMER CARBONATE 1600 MG: 800 TABLET, FILM COATED ORAL at 17:09

## 2024-06-10 RX ADMIN — PRASUGREL 10 MG: 10 TABLET, FILM COATED ORAL at 09:15

## 2024-06-10 RX ADMIN — INSULIN GLARGINE 72 UNITS: 100 INJECTION, SOLUTION SUBCUTANEOUS at 09:14

## 2024-06-10 RX ADMIN — SODIUM BICARBONATE 1300 MG: 650 TABLET ORAL at 09:13

## 2024-06-10 RX ADMIN — CARVEDILOL 3.12 MG: 3.12 TABLET, FILM COATED ORAL at 21:25

## 2024-06-10 RX ADMIN — BUMETANIDE 3 MG: 1 TABLET ORAL at 21:25

## 2024-06-10 RX ADMIN — BUSPIRONE HYDROCHLORIDE 10 MG: 10 TABLET ORAL at 14:09

## 2024-06-10 RX ADMIN — INSULIN LISPRO 2 UNITS: 100 INJECTION, SOLUTION INTRAVENOUS; SUBCUTANEOUS at 09:15

## 2024-06-10 RX ADMIN — HEPARIN SODIUM 5000 UNITS: 5000 INJECTION INTRAVENOUS; SUBCUTANEOUS at 06:15

## 2024-06-10 RX ADMIN — CYCLOBENZAPRINE 5 MG: 10 TABLET, FILM COATED ORAL at 17:09

## 2024-06-10 RX ADMIN — SEVELAMER CARBONATE 1600 MG: 800 TABLET, FILM COATED ORAL at 09:13

## 2024-06-10 RX ADMIN — VENLAFAXINE HYDROCHLORIDE 75 MG: 75 CAPSULE, EXTENDED RELEASE ORAL at 09:13

## 2024-06-10 RX ADMIN — DOCUSATE SODIUM 100 MG: 100 CAPSULE, LIQUID FILLED ORAL at 21:29

## 2024-06-10 RX ADMIN — HYDROCODONE BITARTRATE AND ACETAMINOPHEN 1 TABLET: 5; 325 TABLET ORAL at 21:24

## 2024-06-10 RX ADMIN — SODIUM CHLORIDE, PRESERVATIVE FREE 10 ML: 5 INJECTION INTRAVENOUS at 21:28

## 2024-06-10 RX ADMIN — SODIUM CHLORIDE, PRESERVATIVE FREE 10 ML: 5 INJECTION INTRAVENOUS at 09:16

## 2024-06-10 ASSESSMENT — PAIN DESCRIPTION - LOCATION
LOCATION: WRIST
LOCATION: HAND

## 2024-06-10 ASSESSMENT — PAIN DESCRIPTION - ORIENTATION
ORIENTATION: RIGHT
ORIENTATION: LEFT

## 2024-06-10 ASSESSMENT — PAIN SCALES - GENERAL
PAINLEVEL_OUTOF10: 10
PAINLEVEL_OUTOF10: 4
PAINLEVEL_OUTOF10: 5
PAINLEVEL_OUTOF10: 6
PAINLEVEL_OUTOF10: 4

## 2024-06-10 ASSESSMENT — PAIN - FUNCTIONAL ASSESSMENT
PAIN_FUNCTIONAL_ASSESSMENT: ACTIVITIES ARE NOT PREVENTED
PAIN_FUNCTIONAL_ASSESSMENT: PREVENTS OR INTERFERES SOME ACTIVE ACTIVITIES AND ADLS
PAIN_FUNCTIONAL_ASSESSMENT: PREVENTS OR INTERFERES SOME ACTIVE ACTIVITIES AND ADLS

## 2024-06-10 ASSESSMENT — PAIN DESCRIPTION - DESCRIPTORS
DESCRIPTORS: DISCOMFORT;THROBBING
DESCRIPTORS: ACHING
DESCRIPTORS: DISCOMFORT
DESCRIPTORS: DISCOMFORT;THROBBING
DESCRIPTORS: DISCOMFORT;THROBBING

## 2024-06-10 NOTE — CONSULTS
Family Medicine Consult Note    PCP: Zulma Payne, APRN - CNP    Date of Admission: 6/9/2024    Date of Service: Pt seen/examined on 6/10/2024 and Admitted to Inpatient     Chief Complaint:  fall with right wrist pain      History Of Present Illness:   The patient is a 64 y.o. female who presents to [facility] with history of fall onto her right side while reaching over her bed.     This morning, patient states she is in significant pain. Concerned that she cannot manage at home with fracture since she is right handed.     Past Medical History:        Diagnosis Date    Arthritis     Backache, unspecified     CAD (coronary artery disease)     Cerebral artery occlusion with cerebral infarction ([facility])     CHF (congestive heart failure) ([facility])     Chronic kidney disease     Coronary atherosclerosis of artery bypass graft     COVID 01/31/2022    Cramp of limb     Epistaxis 03/05/2023    Gallstones     Hemodialysis patient (HCC)     MONDAY WEDNESDAY AND FRIDAYS  /  [facility] IN [place]    Hyperlipidemia     Hypertension     Insomnia     Neuromuscular disorder ([facility])     Pneumonia     Psychiatric problem     Thyroid disease     Type II or unspecified type diabetes mellitus with renal manifestations, not stated as uncontrolled(250.40)     Type II or unspecified type diabetes mellitus without mention of complication, not stated as uncontrolled     Unspecified vitamin D deficiency        Past Surgical History:        Procedure Laterality Date    ANKLE FRACTURE SURGERY      AV FISTULA CREATION  12/14/2021    BACK SURGERY      BREAST REDUCTION SURGERY      BREAST SURGERY      CARDIAC CATHETERIZATION  2005    MVD  /  CT CONSULT    CARDIAC PROCEDURE N/A 11/03/2023    ron / Coronary angiography / op [facility] performed by Jairo Hurley MD at [facility] CARDIAC CATH LAB    CARDIAC PROCEDURE N/A 11/15/2023    ron / Percutaneous coronary intervention performed by Jairo Hurley MD at [facility] CARDIAC CATH LAB    CARDIAC PROCEDURE 
    Department of Internal Medicine  Nephrology Cherri Landis MD   Consult Note    Reason for consultation: Management of hemodialysis dependent end-stage renal disease.    Consulting physician: Nima Lynn MD.    History of present illness: This is a 64 y.o. female with a significant past medical history of Systemic hypertension, osteoarthritis,  coronary artery disease [s/p CABG with subsequent graft stent], s/p cerebrovascular accident, type 2 diabetes mellitus and End-stage renal disease secondary to hypertensive and diabetic nephropathy [on routine hemodialysis Monday/Wednesday/Friday at [facility] dialysis unit [facility] urinary care of Dr. Graham using left arm AV fistula], who presented to the emergency department at [facility] for evaluation  after sustaining a fall from a bed landing on her right side. She was complaining of right upper extremity and right hip pain.  Vital signs were stable with blood pressure 118/68 mmHg.    CT scan of the head was negative.  Right wrist x-ray showed nondisplaced and mildly dorsally impacted distal radius fracture with faint linear lucency and focal complexity of the cortex posteriorly suggesting mild buckling of the cortex. Right hip x-ray showed no fracture    Adhesive tape, Imdur [isosorbide nitrate], Isosorbide dinitrate, Ranexa [ranolazine], Metformin and related, Ace inhibitors, Iv dye [iodides], Nsaids, Metformin and related, and Silicone    Past Medical History:   Diagnosis Date    Arthritis     Backache, unspecified     CAD (coronary artery disease)     Cerebral artery occlusion with cerebral infarction (HCC)     CHF (congestive heart failure) (HCC)     Chronic kidney disease     Coronary atherosclerosis of artery bypass graft     COVID 01/31/2022    Cramp of limb     Epistaxis 03/05/2023    Gallstones     Hemodialysis patient (HCC)     MONDAY WEDNESDAY AND FRIDAYS  /  [facility] IN [place]    Hyperlipidemia     Hypertension     Insomnia     
155

## 2024-06-10 NOTE — CARE COORDINATION
Case Management Assessment  Initial Evaluation    Date/Time of Evaluation: 6/10/2024 3:06 PM  Assessment Completed by: Deborah Washington RN    If patient is discharged prior to next notation, then this note serves as note for discharge by case management.    Patient Name: Estefany Garcia                   YOB: 1960  Diagnosis: Fall, initial encounter [W19.XXXA]  Closed fracture of right distal radius and ulna, initial encounter [S52.501A, S52.601A]  Wrist fracture, closed, right, initial encounter [S62.101A]  Closed fracture of right wrist, initial encounter [S62.101A]                   Date / Time: 6/9/2024  8:33 AM    Patient Admission Status: Inpatient   Readmission Risk (Low < 19, Mod (19-27), High > 27): Readmission Risk Score: 30.6    Current PCP: Zulma Payne APRN - CNP  PCP verified by CM? Yes    Chart Reviewed: Yes      History Provided by: Patient  Patient Orientation: Alert and Oriented    Patient Cognition: Alert    Hospitalization in the last 30 days (Readmission):  No    If yes, Readmission Assessment in CM Navigator will be completed.    Advance Directives:      Code Status: Full Code   Patient's Primary Decision Maker is: Legal Next of Kin    Primary Decision Maker: Hailey Guido - Brother/Sister - [phone]    Secondary Decision Maker: Nancy Perdomo - Brother/Sister - [phone]    Discharge Planning:    Patient lives with: Alone Type of Home: House  Primary Care Giver: Self  Patient Support Systems include: Family Members   Current Financial resources: Medicare, Medicaid  Current community resources: None  Current services prior to admission: Durable Medical Equipment            Current DME: Walker, Shower Chair, Wheelchair, Glucometer, Hospital Bed, Cane            Type of Home Care services:  PT, OT, Nursing Services    ADLS  Prior functional level: Independent in ADLs/IADLs  Current functional level: Assistance with the following:, Mobility, Bathing, Dressing, Toileting,

## 2024-06-10 NOTE — ACP (ADVANCE CARE PLANNING)
Advance Care Planning     Advance Care Planning Activator (Inpatient)  Conversation Note      Date of ACP Conversation: 6/10/2024     Conversation Conducted with: Patient with Decision Making Capacity    ACP Activator: Deborah Washington RN    Health Care Decision Maker:     Current Designated Health Care Decision Maker:     Primary Decision Maker: HayHailey - Brother/Sister - [phone]    Secondary Decision Maker: Nancy Perdomo - Brother/Sister - [phone]  Click here to complete Healthcare Decision Makers including section of the Healthcare Decision Maker Relationship (ie \"Primary\")  Today we documented Decision Maker(s) consistent with Legal Next of Kin hierarchy.    Care Preferences    Ventilation:  \"If you were in your present state of health and suddenly became very ill and were unable to breathe on your own, what would your preference be about the use of a ventilator (breathing machine) if it were available to you?\"      Would the patient desire the use of ventilator (breathing machine)?: yes    \"If your health worsens and it becomes clear that your chance of recovery is unlikely, what would your preference be about the use of a ventilator (breathing machine) if it were available to you?\"     Would the patient desire the use of ventilator (breathing machine)?: No      Resuscitation  \"CPR works best to restart the heart when there is a sudden event, like a heart attack, in someone who is otherwise healthy. Unfortunately, CPR does not typically restart the heart for people who have serious health conditions or who are very sick.\"    \"In the event your heart stopped as a result of an underlying serious health condition, would you want attempts to be made to restart your heart (answer \"yes\" for attempt to resuscitate) or would you prefer a natural death (answer \"no\" for do not attempt to resuscitate)?\" yes       [] Yes   [x] No   Educated Patient / Decision Maker regarding differences between Advance

## 2024-06-10 NOTE — PLAN OF CARE
Problem: Discharge Planning  Goal: Discharge to home or other facility with appropriate resources  6/10/2024 0346 by Torri Plaza RN  Outcome: Progressing  6/9/2024 1736 by Ivonne Altamirano RN  Outcome: Progressing     Problem: Pain  Goal: Verbalizes/displays adequate comfort level or baseline comfort level  6/10/2024 0346 by Torri Plaza RN  Outcome: Progressing  6/9/2024 1736 by Ivonne Altamirano RN  Outcome: Progressing     Problem: Safety - Adult  Goal: Free from fall injury  6/10/2024 0346 by Torri Plaza RN  Outcome: Progressing  6/9/2024 1736 by Ivonne Altamirano RN  Outcome: Progressing     Problem: Skin/Tissue Integrity  Goal: Absence of new skin breakdown  Description: 1.  Monitor for areas of redness and/or skin breakdown  2.  Assess vascular access sites hourly  3.  Every 4-6 hours minimum:  Change oxygen saturation probe site  4.  Every 4-6 hours:  If on nasal continuous positive airway pressure, respiratory therapy assess nares and determine need for appliance change or resting period.  Outcome: Progressing

## 2024-06-10 NOTE — DISCHARGE INSTR - COC
Continuity of Care Form    Patient Name: Estefany Garcia   :  1960  MRN:  [id number]    Admit date:  2024  Discharge date:  24    Code Status Order: Full Code   Advance Directives:     Admitting Physician:  Nima Lynn MD  PCP: Zulma Payne, APRN - CNP    Discharging Nurse: DEDE Ross  Discharging Hospital Unit/Room#: 8/-01  Discharging Unit Phone Number: [phone]    Emergency Contact:   Extended Emergency Contact Information  Primary Emergency Contact: Hailey Guido  Home Phone: [phone]  Relation: Brother/Sister  Secondary Emergency Contact: Nancy Perdomo  Home Phone: [phone]  Relation: Brother/Sister    Past Surgical History:  Past Surgical History:   Procedure Laterality Date    ANKLE FRACTURE SURGERY      AV FISTULA CREATION  2021    BACK SURGERY      BREAST REDUCTION SURGERY      BREAST SURGERY      CARDIAC CATHETERIZATION      MVD  /  CT CONSULT    CARDIAC PROCEDURE N/A 2023    ron / Coronary angiography / op [facility] performed by Jairo Ceja MD at [facility] CARDIAC CATH LAB    CARDIAC PROCEDURE N/A 11/15/2023    ron / Percutaneous coronary intervention performed by Jairo Ceja MD at [facility] CARDIAC CATH LAB    CARDIAC PROCEDURE N/A 2024    ron / Left heart cath / op [facility] performed by Jairo Ceja MD at [facility] CARDIAC CATH LAB    CARDIAC PROCEDURE N/A 2024    Percutaneous coronary intervention performed by Jairo Ceja MD at [facility] CARDIAC CATH LAB    CARDIAC SURGERY      CARPAL TUNNEL RELEASE      CHOLECYSTECTOMY, OPEN N/A     COLONOSCOPY      CORONARY ANGIOPLASTY WITH STENT PLACEMENT  2023    PTCA / FADIA RCA    CORONARY ANGIOPLASTY WITH STENT PLACEMENT  2019    STENT X1 AT [facility]    CORONARY ANGIOPLASTY WITH STENT PLACEMENT  2023    DR CEJA  /  FADIA SVG-DIAG  /  NEEDS RCA DONE    CORONARY ARTERY BYPASS GRAFT  02/2005    X3 [facility]    DIALYSIS CATHETER INSERTION Right 2023    [facility] CATHETER

## 2024-06-10 NOTE — PLAN OF CARE
Problem: Discharge Planning  Goal: Discharge to home or other facility with appropriate resources  6/10/2024 1025 by Isi Montalvo, RN  Outcome: Progressing  / following.  Problem: Pain  Goal: Verbalizes/displays adequate comfort level or baseline comfort level  6/10/2024 1025 by Isi Montalvo, RN  Outcome: Progressing    Problem: Safety - Adult  Goal: Free from fall injury  6/10/2024 1025 by Isi Montalvo, RN  Outcome: Progressing  Pt fall risk, fall band present, falling star, safety alarm activated and in use as needed. Hourly rounding performed. Pt encouraged to use call light. See Yves fall risk assessment.

## 2024-06-11 LAB
BACTERIA URNS QL MICRO: ABNORMAL
BILIRUB UR QL STRIP: NEGATIVE
CASTS #/AREA URNS LPF: ABNORMAL /LPF
CASTS #/AREA URNS LPF: ABNORMAL /LPF
CLARITY UR: ABNORMAL
COLOR UR: YELLOW
EPI CELLS #/AREA URNS HPF: ABNORMAL /HPF
GLUCOSE BLD-MCNC: 131 MG/DL (ref 65–105)
GLUCOSE BLD-MCNC: 245 MG/DL (ref 65–105)
GLUCOSE BLD-MCNC: 313 MG/DL (ref 65–105)
GLUCOSE BLD-MCNC: 93 MG/DL (ref 65–105)
GLUCOSE UR STRIP-MCNC: NEGATIVE MG/DL
HGB UR QL STRIP.AUTO: ABNORMAL
KETONES UR STRIP-MCNC: ABNORMAL MG/DL
LEUKOCYTE ESTERASE UR QL STRIP: ABNORMAL
NITRITE UR QL STRIP: NEGATIVE
PH UR STRIP: 5 [PH] (ref 5–8)
PROT UR STRIP-MCNC: ABNORMAL MG/DL
RBC #/AREA URNS HPF: ABNORMAL /HPF
SP GR UR STRIP: 1.02 (ref 1–1.03)
UROBILINOGEN UR STRIP-ACNC: NORMAL EU/DL (ref 0–1)
WBC #/AREA URNS HPF: ABNORMAL /HPF

## 2024-06-11 PROCEDURE — 99232 SBSQ HOSP IP/OBS MODERATE 35: CPT | Performed by: FAMILY MEDICINE

## 2024-06-11 PROCEDURE — 82947 ASSAY GLUCOSE BLOOD QUANT: CPT

## 2024-06-11 PROCEDURE — 87086 URINE CULTURE/COLONY COUNT: CPT

## 2024-06-11 PROCEDURE — 97110 THERAPEUTIC EXERCISES: CPT

## 2024-06-11 PROCEDURE — 6360000002 HC RX W HCPCS: Performed by: FAMILY MEDICINE

## 2024-06-11 PROCEDURE — 1200000000 HC SEMI PRIVATE

## 2024-06-11 PROCEDURE — 81001 URINALYSIS AUTO W/SCOPE: CPT

## 2024-06-11 PROCEDURE — 97116 GAIT TRAINING THERAPY: CPT

## 2024-06-11 PROCEDURE — 6370000000 HC RX 637 (ALT 250 FOR IP): Performed by: FAMILY MEDICINE

## 2024-06-11 PROCEDURE — 6370000000 HC RX 637 (ALT 250 FOR IP): Performed by: ORTHOPAEDIC SURGERY

## 2024-06-11 PROCEDURE — 2580000003 HC RX 258: Performed by: ORTHOPAEDIC SURGERY

## 2024-06-11 RX ADMIN — HYDROCODONE BITARTRATE AND ACETAMINOPHEN 1 TABLET: 5; 325 TABLET ORAL at 08:52

## 2024-06-11 RX ADMIN — BUSPIRONE HYDROCHLORIDE 10 MG: 10 TABLET ORAL at 08:34

## 2024-06-11 RX ADMIN — VENLAFAXINE HYDROCHLORIDE 75 MG: 75 CAPSULE, EXTENDED RELEASE ORAL at 08:33

## 2024-06-11 RX ADMIN — DOCUSATE SODIUM 100 MG: 100 CAPSULE, LIQUID FILLED ORAL at 21:50

## 2024-06-11 RX ADMIN — ACETAMINOPHEN 650 MG: 325 TABLET ORAL at 21:49

## 2024-06-11 RX ADMIN — BUMETANIDE 3 MG: 1 TABLET ORAL at 21:49

## 2024-06-11 RX ADMIN — Medication 10.5 MG: at 22:00

## 2024-06-11 RX ADMIN — HEPARIN SODIUM 5000 UNITS: 5000 INJECTION INTRAVENOUS; SUBCUTANEOUS at 06:02

## 2024-06-11 RX ADMIN — INSULIN LISPRO 20 UNITS: 100 INJECTION, SOLUTION INTRAVENOUS; SUBCUTANEOUS at 12:02

## 2024-06-11 RX ADMIN — HEPARIN SODIUM 5000 UNITS: 5000 INJECTION INTRAVENOUS; SUBCUTANEOUS at 13:30

## 2024-06-11 RX ADMIN — INSULIN GLARGINE 72 UNITS: 100 INJECTION, SOLUTION SUBCUTANEOUS at 08:35

## 2024-06-11 RX ADMIN — CALCIUM POLYCARBOPHIL 1250 MG: 625 TABLET ORAL at 21:53

## 2024-06-11 RX ADMIN — SEVELAMER CARBONATE 1600 MG: 800 TABLET, FILM COATED ORAL at 08:33

## 2024-06-11 RX ADMIN — CARVEDILOL 3.12 MG: 3.12 TABLET, FILM COATED ORAL at 08:33

## 2024-06-11 RX ADMIN — BUMETANIDE 3 MG: 1 TABLET ORAL at 08:33

## 2024-06-11 RX ADMIN — INSULIN LISPRO 20 UNITS: 100 INJECTION, SOLUTION INTRAVENOUS; SUBCUTANEOUS at 08:35

## 2024-06-11 RX ADMIN — BUSPIRONE HYDROCHLORIDE 10 MG: 10 TABLET ORAL at 13:30

## 2024-06-11 RX ADMIN — ALLOPURINOL 100 MG: 100 TABLET ORAL at 08:34

## 2024-06-11 RX ADMIN — CYCLOBENZAPRINE 5 MG: 10 TABLET, FILM COATED ORAL at 03:21

## 2024-06-11 RX ADMIN — INSULIN LISPRO 2 UNITS: 100 INJECTION, SOLUTION INTRAVENOUS; SUBCUTANEOUS at 08:35

## 2024-06-11 RX ADMIN — POTASSIUM CHLORIDE 10 MEQ: 750 CAPSULE, EXTENDED RELEASE ORAL at 21:50

## 2024-06-11 RX ADMIN — POTASSIUM CHLORIDE 10 MEQ: 750 CAPSULE, EXTENDED RELEASE ORAL at 08:33

## 2024-06-11 RX ADMIN — SODIUM BICARBONATE 1300 MG: 650 TABLET ORAL at 08:34

## 2024-06-11 RX ADMIN — CARVEDILOL 3.12 MG: 3.12 TABLET, FILM COATED ORAL at 21:50

## 2024-06-11 RX ADMIN — SEVELAMER CARBONATE 1600 MG: 800 TABLET, FILM COATED ORAL at 12:02

## 2024-06-11 RX ADMIN — Medication 400 MG: at 21:50

## 2024-06-11 RX ADMIN — ATORVASTATIN CALCIUM 80 MG: 80 TABLET, FILM COATED ORAL at 21:50

## 2024-06-11 RX ADMIN — SODIUM CHLORIDE, PRESERVATIVE FREE 10 ML: 5 INJECTION INTRAVENOUS at 21:53

## 2024-06-11 RX ADMIN — SEVELAMER CARBONATE 1600 MG: 800 TABLET, FILM COATED ORAL at 17:03

## 2024-06-11 RX ADMIN — SODIUM BICARBONATE 1300 MG: 650 TABLET ORAL at 21:50

## 2024-06-11 RX ADMIN — BUSPIRONE HYDROCHLORIDE 10 MG: 10 TABLET ORAL at 21:50

## 2024-06-11 RX ADMIN — HEPARIN SODIUM 5000 UNITS: 5000 INJECTION INTRAVENOUS; SUBCUTANEOUS at 21:50

## 2024-06-11 RX ADMIN — CALCIUM POLYCARBOPHIL 1250 MG: 625 TABLET ORAL at 08:36

## 2024-06-11 RX ADMIN — ASPIRIN 81 MG 81 MG: 81 TABLET ORAL at 08:34

## 2024-06-11 RX ADMIN — INSULIN LISPRO 6 UNITS: 100 INJECTION, SOLUTION INTRAVENOUS; SUBCUTANEOUS at 12:03

## 2024-06-11 RX ADMIN — SODIUM CHLORIDE, PRESERVATIVE FREE 10 ML: 5 INJECTION INTRAVENOUS at 08:36

## 2024-06-11 RX ADMIN — INSULIN LISPRO 20 UNITS: 100 INJECTION, SOLUTION INTRAVENOUS; SUBCUTANEOUS at 17:03

## 2024-06-11 RX ADMIN — PRASUGREL 10 MG: 10 TABLET, FILM COATED ORAL at 08:36

## 2024-06-11 RX ADMIN — DOCUSATE SODIUM 100 MG: 100 CAPSULE, LIQUID FILLED ORAL at 08:33

## 2024-06-11 ASSESSMENT — PAIN DESCRIPTION - LOCATION
LOCATION: WRIST
LOCATION: WRIST
LOCATION: HAND
LOCATION: HAND;WRIST

## 2024-06-11 ASSESSMENT — PAIN SCALES - GENERAL
PAINLEVEL_OUTOF10: 6
PAINLEVEL_OUTOF10: 5
PAINLEVEL_OUTOF10: 8
PAINLEVEL_OUTOF10: 9

## 2024-06-11 ASSESSMENT — PAIN DESCRIPTION - DESCRIPTORS
DESCRIPTORS: DISCOMFORT;THROBBING;ACHING
DESCRIPTORS: ACHING
DESCRIPTORS: DISCOMFORT;THROBBING;ACHING

## 2024-06-11 ASSESSMENT — PAIN DESCRIPTION - ORIENTATION
ORIENTATION: RIGHT

## 2024-06-11 NOTE — CARE COORDINATION
Writer is following for potential discharge placement.  Writer placed message to Karol with [facility] regarding referral. Facility has no beds at this time.    Writer placed call to Reena at [place] [facility] to confirm facility of choice.  No answer, voicemail left for return call.  Electronically signed by AZ Saez on 6/11/2024 at 10:20 AM    Writer received call from Yodit with [facility] at [place]. Facility will start authorization for this pt.  Electronically signed by AZ Saez on 6/11/2024 at 4:27 PM

## 2024-06-11 NOTE — PLAN OF CARE
Problem: Discharge Planning  Goal: Discharge to home or other facility with appropriate resources  Outcome: Progressing  / following.  Problem: Pain  Goal: Verbalizes/displays adequate comfort level or baseline comfort level  Outcome: Progressing   Medicated as ordered. Repositioned for comfort. Education on non pharmacological comfort measures as well.  Problem: Safety - Adult  Goal: Free from fall injury  Outcome: Progressing   Pt fall risk, fall band present, falling star, safety alarm activated and in use as needed. Hourly rounding performed. Pt encouraged to use call light. See Yves fall risk assessment.   Problem: Skin/Tissue Integrity  Goal: Absence of new skin breakdown  Description: 1.  Monitor for areas of redness and/or skin breakdown  2.  Assess vascular access sites hourly  3.  Every 4-6 hours minimum:  Change oxygen saturation probe site  4.  Every 4-6 hours:  If on nasal continuous positive airway pressure, respiratory therapy assess nares and determine need for appliance change or resting period.  Outcome: Progressing

## 2024-06-12 LAB
ANION GAP SERPL CALCULATED.3IONS-SCNC: 13 MMOL/L (ref 9–17)
BUN SERPL-MCNC: 34 MG/DL (ref 8–23)
CALCIUM SERPL-MCNC: 9.3 MG/DL (ref 8.6–10.4)
CHLORIDE SERPL-SCNC: 95 MMOL/L (ref 98–107)
CO2 SERPL-SCNC: 26 MMOL/L (ref 20–31)
CREAT SERPL-MCNC: 3.8 MG/DL (ref 0.5–0.9)
GFR, ESTIMATED: 13 ML/MIN/1.73M2
GLUCOSE BLD-MCNC: 175 MG/DL (ref 65–105)
GLUCOSE BLD-MCNC: 176 MG/DL (ref 65–105)
GLUCOSE BLD-MCNC: 217 MG/DL (ref 65–105)
GLUCOSE BLD-MCNC: 245 MG/DL (ref 65–105)
GLUCOSE SERPL-MCNC: 286 MG/DL (ref 70–99)
HCT VFR BLD AUTO: 33.3 % (ref 36–46)
HGB BLD-MCNC: 10.8 G/DL (ref 12–16)
MICROORGANISM SPEC CULT: NORMAL
POTASSIUM SERPL-SCNC: 3.7 MMOL/L (ref 3.7–5.3)
SODIUM SERPL-SCNC: 134 MMOL/L (ref 135–144)
SPECIMEN DESCRIPTION: NORMAL

## 2024-06-12 PROCEDURE — 85018 HEMOGLOBIN: CPT

## 2024-06-12 PROCEDURE — 85014 HEMATOCRIT: CPT

## 2024-06-12 PROCEDURE — 1200000000 HC SEMI PRIVATE

## 2024-06-12 PROCEDURE — 6360000002 HC RX W HCPCS: Performed by: FAMILY MEDICINE

## 2024-06-12 PROCEDURE — 82947 ASSAY GLUCOSE BLOOD QUANT: CPT

## 2024-06-12 PROCEDURE — 90935 HEMODIALYSIS ONE EVALUATION: CPT

## 2024-06-12 PROCEDURE — 2580000003 HC RX 258: Performed by: ORTHOPAEDIC SURGERY

## 2024-06-12 PROCEDURE — 36415 COLL VENOUS BLD VENIPUNCTURE: CPT

## 2024-06-12 PROCEDURE — 6370000000 HC RX 637 (ALT 250 FOR IP): Performed by: FAMILY MEDICINE

## 2024-06-12 PROCEDURE — 6370000000 HC RX 637 (ALT 250 FOR IP): Performed by: ORTHOPAEDIC SURGERY

## 2024-06-12 PROCEDURE — 80048 BASIC METABOLIC PNL TOTAL CA: CPT

## 2024-06-12 RX ORDER — HYDROCODONE BITARTRATE AND ACETAMINOPHEN 5; 325 MG/1; MG/1
1 TABLET ORAL EVERY 4 HOURS PRN
Qty: 20 TABLET | Refills: 0 | Status: SHIPPED | OUTPATIENT
Start: 2024-06-12 | End: 2024-06-19

## 2024-06-12 RX ADMIN — BUSPIRONE HYDROCHLORIDE 10 MG: 10 TABLET ORAL at 20:55

## 2024-06-12 RX ADMIN — SEVELAMER CARBONATE 1600 MG: 800 TABLET, FILM COATED ORAL at 17:04

## 2024-06-12 RX ADMIN — ATORVASTATIN CALCIUM 80 MG: 80 TABLET, FILM COATED ORAL at 20:55

## 2024-06-12 RX ADMIN — PRASUGREL 10 MG: 10 TABLET, FILM COATED ORAL at 14:32

## 2024-06-12 RX ADMIN — INSULIN GLARGINE 72 UNITS: 100 INJECTION, SOLUTION SUBCUTANEOUS at 08:52

## 2024-06-12 RX ADMIN — DOCUSATE SODIUM 100 MG: 100 CAPSULE, LIQUID FILLED ORAL at 20:54

## 2024-06-12 RX ADMIN — HYDROCODONE BITARTRATE AND ACETAMINOPHEN 1 TABLET: 5; 325 TABLET ORAL at 12:43

## 2024-06-12 RX ADMIN — CARVEDILOL 3.12 MG: 3.12 TABLET, FILM COATED ORAL at 20:53

## 2024-06-12 RX ADMIN — INSULIN LISPRO 2 UNITS: 100 INJECTION, SOLUTION INTRAVENOUS; SUBCUTANEOUS at 17:04

## 2024-06-12 RX ADMIN — INSULIN GLARGINE 42 UNITS: 100 INJECTION, SOLUTION SUBCUTANEOUS at 21:01

## 2024-06-12 RX ADMIN — INSULIN LISPRO 20 UNITS: 100 INJECTION, SOLUTION INTRAVENOUS; SUBCUTANEOUS at 08:54

## 2024-06-12 RX ADMIN — ALLOPURINOL 100 MG: 100 TABLET ORAL at 14:32

## 2024-06-12 RX ADMIN — ASPIRIN 81 MG 81 MG: 81 TABLET ORAL at 14:32

## 2024-06-12 RX ADMIN — INSULIN LISPRO 20 UNITS: 100 INJECTION, SOLUTION INTRAVENOUS; SUBCUTANEOUS at 17:04

## 2024-06-12 RX ADMIN — SODIUM BICARBONATE 1300 MG: 650 TABLET ORAL at 20:57

## 2024-06-12 RX ADMIN — HYDROCODONE BITARTRATE AND ACETAMINOPHEN 1 TABLET: 5; 325 TABLET ORAL at 20:59

## 2024-06-12 RX ADMIN — BUSPIRONE HYDROCHLORIDE 10 MG: 10 TABLET ORAL at 14:32

## 2024-06-12 RX ADMIN — INSULIN LISPRO 2 UNITS: 100 INJECTION, SOLUTION INTRAVENOUS; SUBCUTANEOUS at 08:55

## 2024-06-12 RX ADMIN — HEPARIN SODIUM 5000 UNITS: 5000 INJECTION INTRAVENOUS; SUBCUTANEOUS at 06:14

## 2024-06-12 RX ADMIN — HYDROCODONE BITARTRATE AND ACETAMINOPHEN 1 TABLET: 5; 325 TABLET ORAL at 04:26

## 2024-06-12 RX ADMIN — CALCIUM POLYCARBOPHIL 1250 MG: 625 TABLET ORAL at 21:06

## 2024-06-12 RX ADMIN — HEPARIN SODIUM 5000 UNITS: 5000 INJECTION INTRAVENOUS; SUBCUTANEOUS at 21:00

## 2024-06-12 RX ADMIN — BUMETANIDE 3 MG: 1 TABLET ORAL at 20:56

## 2024-06-12 RX ADMIN — Medication 400 MG: at 20:56

## 2024-06-12 RX ADMIN — SODIUM CHLORIDE, PRESERVATIVE FREE 10 ML: 5 INJECTION INTRAVENOUS at 14:37

## 2024-06-12 RX ADMIN — SODIUM CHLORIDE, PRESERVATIVE FREE 10 ML: 5 INJECTION INTRAVENOUS at 21:07

## 2024-06-12 RX ADMIN — HEPARIN SODIUM 5000 UNITS: 5000 INJECTION INTRAVENOUS; SUBCUTANEOUS at 14:32

## 2024-06-12 RX ADMIN — POTASSIUM CHLORIDE 10 MEQ: 750 CAPSULE, EXTENDED RELEASE ORAL at 20:54

## 2024-06-12 ASSESSMENT — PAIN SCALES - GENERAL
PAINLEVEL_OUTOF10: 9
PAINLEVEL_OUTOF10: 5
PAINLEVEL_OUTOF10: 10
PAINLEVEL_OUTOF10: 4
PAINLEVEL_OUTOF10: 8

## 2024-06-12 ASSESSMENT — PAIN - FUNCTIONAL ASSESSMENT: PAIN_FUNCTIONAL_ASSESSMENT: ACTIVITIES ARE NOT PREVENTED

## 2024-06-12 ASSESSMENT — PAIN SCALES - WONG BAKER: WONGBAKER_NUMERICALRESPONSE: HURTS A LITTLE BIT

## 2024-06-12 ASSESSMENT — PAIN DESCRIPTION - DESCRIPTORS
DESCRIPTORS: ACHING;THROBBING
DESCRIPTORS: ACHING
DESCRIPTORS: ACHING

## 2024-06-12 ASSESSMENT — PAIN DESCRIPTION - ORIENTATION
ORIENTATION: RIGHT

## 2024-06-12 ASSESSMENT — PAIN DESCRIPTION - LOCATION
LOCATION: ARM
LOCATION: SHOULDER;WRIST
LOCATION: WRIST

## 2024-06-12 NOTE — CARE COORDINATION
Writer is following potential discharge to [facility].  Writer spoke to Yodit with [place] at [place]. Authorization is pending at this time.  Electronically signed by AZ Saez on 6/12/2024 at 12:44 PM

## 2024-06-13 LAB
GLUCOSE BLD-MCNC: 173 MG/DL (ref 65–105)
GLUCOSE BLD-MCNC: 209 MG/DL (ref 65–105)
GLUCOSE BLD-MCNC: 213 MG/DL (ref 65–105)
GLUCOSE BLD-MCNC: 317 MG/DL (ref 65–105)

## 2024-06-13 PROCEDURE — 99239 HOSP IP/OBS DSCHRG MGMT >30: CPT | Performed by: FAMILY MEDICINE

## 2024-06-13 PROCEDURE — 2580000003 HC RX 258: Performed by: ORTHOPAEDIC SURGERY

## 2024-06-13 PROCEDURE — 97110 THERAPEUTIC EXERCISES: CPT

## 2024-06-13 PROCEDURE — 6370000000 HC RX 637 (ALT 250 FOR IP): Performed by: FAMILY MEDICINE

## 2024-06-13 PROCEDURE — 6360000002 HC RX W HCPCS: Performed by: FAMILY MEDICINE

## 2024-06-13 PROCEDURE — 82947 ASSAY GLUCOSE BLOOD QUANT: CPT

## 2024-06-13 PROCEDURE — 1200000000 HC SEMI PRIVATE

## 2024-06-13 PROCEDURE — 97116 GAIT TRAINING THERAPY: CPT

## 2024-06-13 PROCEDURE — 6370000000 HC RX 637 (ALT 250 FOR IP): Performed by: ORTHOPAEDIC SURGERY

## 2024-06-13 RX ORDER — DEXTROSE MONOHYDRATE 100 MG/ML
INJECTION, SOLUTION INTRAVENOUS CONTINUOUS PRN
Status: DISCONTINUED | OUTPATIENT
Start: 2024-06-13 | End: 2024-06-17 | Stop reason: HOSPADM

## 2024-06-13 RX ADMIN — CARVEDILOL 3.12 MG: 3.12 TABLET, FILM COATED ORAL at 08:52

## 2024-06-13 RX ADMIN — POTASSIUM CHLORIDE 10 MEQ: 750 CAPSULE, EXTENDED RELEASE ORAL at 21:13

## 2024-06-13 RX ADMIN — HEPARIN SODIUM 5000 UNITS: 5000 INJECTION INTRAVENOUS; SUBCUTANEOUS at 21:16

## 2024-06-13 RX ADMIN — INSULIN LISPRO 20 UNITS: 100 INJECTION, SOLUTION INTRAVENOUS; SUBCUTANEOUS at 12:29

## 2024-06-13 RX ADMIN — HYDROCODONE BITARTRATE AND ACETAMINOPHEN 1 TABLET: 5; 325 TABLET ORAL at 21:22

## 2024-06-13 RX ADMIN — SEVELAMER CARBONATE 1600 MG: 800 TABLET, FILM COATED ORAL at 08:52

## 2024-06-13 RX ADMIN — INSULIN LISPRO 2 UNITS: 100 INJECTION, SOLUTION INTRAVENOUS; SUBCUTANEOUS at 16:20

## 2024-06-13 RX ADMIN — HEPARIN SODIUM 5000 UNITS: 5000 INJECTION INTRAVENOUS; SUBCUTANEOUS at 13:31

## 2024-06-13 RX ADMIN — DOCUSATE SODIUM 100 MG: 100 CAPSULE, LIQUID FILLED ORAL at 21:12

## 2024-06-13 RX ADMIN — SODIUM BICARBONATE 1300 MG: 650 TABLET ORAL at 08:51

## 2024-06-13 RX ADMIN — INSULIN LISPRO 6 UNITS: 100 INJECTION, SOLUTION INTRAVENOUS; SUBCUTANEOUS at 12:30

## 2024-06-13 RX ADMIN — INSULIN GLARGINE 42 UNITS: 100 INJECTION, SOLUTION SUBCUTANEOUS at 21:23

## 2024-06-13 RX ADMIN — CALCIUM POLYCARBOPHIL 1250 MG: 625 TABLET ORAL at 08:58

## 2024-06-13 RX ADMIN — SEVELAMER CARBONATE 1600 MG: 800 TABLET, FILM COATED ORAL at 16:22

## 2024-06-13 RX ADMIN — POTASSIUM CHLORIDE 10 MEQ: 750 CAPSULE, EXTENDED RELEASE ORAL at 08:51

## 2024-06-13 RX ADMIN — INSULIN LISPRO 20 UNITS: 100 INJECTION, SOLUTION INTRAVENOUS; SUBCUTANEOUS at 08:48

## 2024-06-13 RX ADMIN — BUSPIRONE HYDROCHLORIDE 10 MG: 10 TABLET ORAL at 21:13

## 2024-06-13 RX ADMIN — SODIUM BICARBONATE 1300 MG: 650 TABLET ORAL at 21:11

## 2024-06-13 RX ADMIN — SEVELAMER CARBONATE 1600 MG: 800 TABLET, FILM COATED ORAL at 12:37

## 2024-06-13 RX ADMIN — INSULIN GLARGINE 72 UNITS: 100 INJECTION, SOLUTION SUBCUTANEOUS at 08:43

## 2024-06-13 RX ADMIN — BUSPIRONE HYDROCHLORIDE 10 MG: 10 TABLET ORAL at 13:31

## 2024-06-13 RX ADMIN — INSULIN LISPRO 2 UNITS: 100 INJECTION, SOLUTION INTRAVENOUS; SUBCUTANEOUS at 08:46

## 2024-06-13 RX ADMIN — DOCUSATE SODIUM 100 MG: 100 CAPSULE, LIQUID FILLED ORAL at 08:51

## 2024-06-13 RX ADMIN — HEPARIN SODIUM 5000 UNITS: 5000 INJECTION INTRAVENOUS; SUBCUTANEOUS at 06:13

## 2024-06-13 RX ADMIN — FERROUS SULFATE TAB 325 MG (65 MG ELEMENTAL FE) 325 MG: 325 (65 FE) TAB at 08:52

## 2024-06-13 RX ADMIN — CALCIUM POLYCARBOPHIL 1250 MG: 625 TABLET ORAL at 21:24

## 2024-06-13 RX ADMIN — PRASUGREL 10 MG: 10 TABLET, FILM COATED ORAL at 08:58

## 2024-06-13 RX ADMIN — ALLOPURINOL 100 MG: 100 TABLET ORAL at 08:51

## 2024-06-13 RX ADMIN — SODIUM CHLORIDE, PRESERVATIVE FREE 10 ML: 5 INJECTION INTRAVENOUS at 21:23

## 2024-06-13 RX ADMIN — VENLAFAXINE HYDROCHLORIDE 75 MG: 75 CAPSULE, EXTENDED RELEASE ORAL at 08:51

## 2024-06-13 RX ADMIN — BUMETANIDE 3 MG: 1 TABLET ORAL at 08:52

## 2024-06-13 RX ADMIN — SODIUM CHLORIDE, PRESERVATIVE FREE 10 ML: 5 INJECTION INTRAVENOUS at 09:00

## 2024-06-13 RX ADMIN — BUSPIRONE HYDROCHLORIDE 10 MG: 10 TABLET ORAL at 08:51

## 2024-06-13 RX ADMIN — HYDROCODONE BITARTRATE AND ACETAMINOPHEN 1 TABLET: 5; 325 TABLET ORAL at 09:30

## 2024-06-13 RX ADMIN — INSULIN LISPRO 20 UNITS: 100 INJECTION, SOLUTION INTRAVENOUS; SUBCUTANEOUS at 16:21

## 2024-06-13 RX ADMIN — Medication 10.5 MG: at 21:19

## 2024-06-13 RX ADMIN — CARVEDILOL 3.12 MG: 3.12 TABLET, FILM COATED ORAL at 21:15

## 2024-06-13 RX ADMIN — ASPIRIN 81 MG 81 MG: 81 TABLET ORAL at 08:51

## 2024-06-13 RX ADMIN — Medication 400 MG: at 21:11

## 2024-06-13 RX ADMIN — ATORVASTATIN CALCIUM 80 MG: 80 TABLET, FILM COATED ORAL at 21:13

## 2024-06-13 RX ADMIN — BUMETANIDE 3 MG: 1 TABLET ORAL at 21:14

## 2024-06-13 ASSESSMENT — PAIN DESCRIPTION - LOCATION
LOCATION: ARM
LOCATION: HAND;WRIST
LOCATION: ARM

## 2024-06-13 ASSESSMENT — PAIN DESCRIPTION - ORIENTATION
ORIENTATION: RIGHT
ORIENTATION: RIGHT

## 2024-06-13 ASSESSMENT — PAIN SCALES - WONG BAKER: WONGBAKER_NUMERICALRESPONSE: HURTS A LITTLE BIT

## 2024-06-13 ASSESSMENT — PAIN DESCRIPTION - DESCRIPTORS
DESCRIPTORS: ACHING
DESCRIPTORS: DISCOMFORT;TENDER
DESCRIPTORS: ACHING

## 2024-06-13 ASSESSMENT — PAIN SCALES - GENERAL
PAINLEVEL_OUTOF10: 5
PAINLEVEL_OUTOF10: 9
PAINLEVEL_OUTOF10: 8
PAINLEVEL_OUTOF10: 5
PAINLEVEL_OUTOF10: 5

## 2024-06-13 NOTE — CARE COORDINATION
ONGOING DISCHARGE PLAN:    This writer contacted Karol with [place] at [place] to see where we were on insurance authorization. Per Karol this is still pending, however they have submitted updated notes today.     Patient is alert and oriented x4.    Spoke with patient regarding discharge plan and patient confirms that plan is still to discharge to [facility]. Updated the patient that we are awaiting insurance authorization.     IMM letter provided to patient.  Patient offered four hours to make informed decision regarding appeal process; patient agreeable to discharge.     Will continue to follow for additional discharge needs.    If patient is discharged prior to next notation, then this note serves as note for discharge by case management.    Electronically signed by Deborah Washington RN on 6/13/2024 at 9:43 AM

## 2024-06-13 NOTE — PLAN OF CARE
Problem: Discharge Planning  Goal: Discharge to home or other facility with appropriate resources  6/12/2024 2322 by Sophie Carrillo, RN  Outcome: Progressing  Flowsheets (Taken 6/12/2024 2110)  Discharge to home or other facility with appropriate resources:   Identify barriers to discharge with patient and caregiver   Arrange for needed discharge resources and transportation as appropriate   Identify discharge learning needs (meds, wound care, etc)   Arrange for interpreters to assist at discharge as needed   Refer to discharge planning if patient needs post-hospital services based on physician order or complex needs related to functional status, cognitive ability or social support system       Problem: Pain  Goal: Verbalizes/displays adequate comfort level or baseline comfort level  6/12/2024 2322 by Sophie Carrillo, RN  Outcome: Progressing, Patient expresses relief following administration of prn pain medication.      Problem: Safety - Adult  Goal: Free from fall injury  6/12/2024 2322 by Sophie Carrillo, RN  Outcome: Progressing, Patient remains free of incidence/ injury. Bed remains in low position. Side rails up x2.        Problem: Skin/Tissue Integrity  Goal: Absence of new skin breakdown  Description: 1.  Monitor for areas of redness and/or skin breakdown  2.  Assess vascular access sites hourly  3.  Every 4-6 hours minimum:  Change oxygen saturation probe site  4.  Every 4-6 hours:  If on nasal continuous positive airway pressure, respiratory therapy assess nares and determine need for appliance change or resting period.  6/12/2024 2322 by Sophie Carrillo, RN  Outcome: Progressing, No new occurrence of skin breakdown noted during this shift.

## 2024-06-13 NOTE — CARE COORDINATION
DISCHARGE PLANNING NOTE:    This writer reached out to Karol with [facility] at [place] a second time regarding insurance authorization. At this time it is still pending. Per Karol she will contact us as soon as she has any updates.    Electronically signed by Deborah Washington RN on 6/13/2024 at 2:53 PM

## 2024-06-14 LAB
ANION GAP SERPL CALCULATED.3IONS-SCNC: 18 MMOL/L (ref 9–17)
BUN SERPL-MCNC: 44 MG/DL (ref 8–23)
CALCIUM SERPL-MCNC: 9.5 MG/DL (ref 8.6–10.4)
CHLORIDE SERPL-SCNC: 97 MMOL/L (ref 98–107)
CO2 SERPL-SCNC: 21 MMOL/L (ref 20–31)
CREAT SERPL-MCNC: 4.4 MG/DL (ref 0.5–0.9)
GFR, ESTIMATED: 10 ML/MIN/1.73M2
GLUCOSE BLD-MCNC: 189 MG/DL (ref 65–105)
GLUCOSE BLD-MCNC: 195 MG/DL (ref 65–105)
GLUCOSE BLD-MCNC: 255 MG/DL (ref 65–105)
GLUCOSE BLD-MCNC: 294 MG/DL (ref 65–105)
GLUCOSE SERPL-MCNC: 286 MG/DL (ref 70–99)
HCT VFR BLD AUTO: 35.1 % (ref 36–46)
HGB BLD-MCNC: 11.8 G/DL (ref 12–16)
POTASSIUM SERPL-SCNC: 4.3 MMOL/L (ref 3.7–5.3)
SODIUM SERPL-SCNC: 136 MMOL/L (ref 135–144)

## 2024-06-14 PROCEDURE — 85014 HEMATOCRIT: CPT

## 2024-06-14 PROCEDURE — 97110 THERAPEUTIC EXERCISES: CPT

## 2024-06-14 PROCEDURE — 36415 COLL VENOUS BLD VENIPUNCTURE: CPT

## 2024-06-14 PROCEDURE — 97116 GAIT TRAINING THERAPY: CPT

## 2024-06-14 PROCEDURE — 6360000002 HC RX W HCPCS: Performed by: FAMILY MEDICINE

## 2024-06-14 PROCEDURE — 90935 HEMODIALYSIS ONE EVALUATION: CPT

## 2024-06-14 PROCEDURE — 6370000000 HC RX 637 (ALT 250 FOR IP): Performed by: FAMILY MEDICINE

## 2024-06-14 PROCEDURE — 1200000000 HC SEMI PRIVATE

## 2024-06-14 PROCEDURE — 85018 HEMOGLOBIN: CPT

## 2024-06-14 PROCEDURE — 6370000000 HC RX 637 (ALT 250 FOR IP): Performed by: ORTHOPAEDIC SURGERY

## 2024-06-14 PROCEDURE — 80048 BASIC METABOLIC PNL TOTAL CA: CPT

## 2024-06-14 PROCEDURE — 2580000003 HC RX 258: Performed by: ORTHOPAEDIC SURGERY

## 2024-06-14 PROCEDURE — 82947 ASSAY GLUCOSE BLOOD QUANT: CPT

## 2024-06-14 RX ADMIN — HEPARIN SODIUM 5000 UNITS: 5000 INJECTION INTRAVENOUS; SUBCUTANEOUS at 20:44

## 2024-06-14 RX ADMIN — CYCLOBENZAPRINE 5 MG: 10 TABLET, FILM COATED ORAL at 20:44

## 2024-06-14 RX ADMIN — ATORVASTATIN CALCIUM 80 MG: 80 TABLET, FILM COATED ORAL at 20:44

## 2024-06-14 RX ADMIN — VENLAFAXINE HYDROCHLORIDE 75 MG: 75 CAPSULE, EXTENDED RELEASE ORAL at 14:40

## 2024-06-14 RX ADMIN — HYDROCODONE BITARTRATE AND ACETAMINOPHEN 1 TABLET: 5; 325 TABLET ORAL at 22:15

## 2024-06-14 RX ADMIN — Medication 10.5 MG: at 22:15

## 2024-06-14 RX ADMIN — PRASUGREL 10 MG: 10 TABLET, FILM COATED ORAL at 16:28

## 2024-06-14 RX ADMIN — HYDROCODONE BITARTRATE AND ACETAMINOPHEN 1 TABLET: 5; 325 TABLET ORAL at 14:41

## 2024-06-14 RX ADMIN — SODIUM CHLORIDE, PRESERVATIVE FREE 10 ML: 5 INJECTION INTRAVENOUS at 08:34

## 2024-06-14 RX ADMIN — INSULIN LISPRO 20 UNITS: 100 INJECTION, SOLUTION INTRAVENOUS; SUBCUTANEOUS at 08:22

## 2024-06-14 RX ADMIN — SODIUM BICARBONATE 1300 MG: 650 TABLET ORAL at 20:43

## 2024-06-14 RX ADMIN — ASPIRIN 81 MG 81 MG: 81 TABLET ORAL at 14:40

## 2024-06-14 RX ADMIN — SEVELAMER CARBONATE 1600 MG: 800 TABLET, FILM COATED ORAL at 14:48

## 2024-06-14 RX ADMIN — POTASSIUM CHLORIDE 10 MEQ: 750 CAPSULE, EXTENDED RELEASE ORAL at 20:44

## 2024-06-14 RX ADMIN — CALCIUM POLYCARBOPHIL 1250 MG: 625 TABLET ORAL at 20:43

## 2024-06-14 RX ADMIN — BUMETANIDE 3 MG: 1 TABLET ORAL at 20:44

## 2024-06-14 RX ADMIN — INSULIN GLARGINE 72 UNITS: 100 INJECTION, SOLUTION SUBCUTANEOUS at 08:22

## 2024-06-14 RX ADMIN — DOCUSATE SODIUM 100 MG: 100 CAPSULE, LIQUID FILLED ORAL at 20:44

## 2024-06-14 RX ADMIN — Medication 400 MG: at 20:44

## 2024-06-14 RX ADMIN — FERROUS SULFATE TAB 325 MG (65 MG ELEMENTAL FE) 325 MG: 325 (65 FE) TAB at 14:40

## 2024-06-14 RX ADMIN — INSULIN GLARGINE 42 UNITS: 100 INJECTION, SOLUTION SUBCUTANEOUS at 20:43

## 2024-06-14 RX ADMIN — INSULIN LISPRO 20 UNITS: 100 INJECTION, SOLUTION INTRAVENOUS; SUBCUTANEOUS at 16:27

## 2024-06-14 RX ADMIN — BUSPIRONE HYDROCHLORIDE 10 MG: 10 TABLET ORAL at 20:44

## 2024-06-14 RX ADMIN — BUSPIRONE HYDROCHLORIDE 10 MG: 10 TABLET ORAL at 14:40

## 2024-06-14 RX ADMIN — SODIUM CHLORIDE, PRESERVATIVE FREE 10 ML: 5 INJECTION INTRAVENOUS at 20:44

## 2024-06-14 RX ADMIN — ALLOPURINOL 100 MG: 100 TABLET ORAL at 14:40

## 2024-06-14 RX ADMIN — HEPARIN SODIUM 5000 UNITS: 5000 INJECTION INTRAVENOUS; SUBCUTANEOUS at 05:56

## 2024-06-14 ASSESSMENT — PAIN SCALES - GENERAL
PAINLEVEL_OUTOF10: 3
PAINLEVEL_OUTOF10: 2
PAINLEVEL_OUTOF10: 10
PAINLEVEL_OUTOF10: 6
PAINLEVEL_OUTOF10: 6
PAINLEVEL_OUTOF10: 0
PAINLEVEL_OUTOF10: 8

## 2024-06-14 ASSESSMENT — PAIN DESCRIPTION - PAIN TYPE
TYPE: ACUTE PAIN

## 2024-06-14 ASSESSMENT — PAIN DESCRIPTION - DESCRIPTORS
DESCRIPTORS: ACHING

## 2024-06-14 ASSESSMENT — PAIN DESCRIPTION - ORIENTATION
ORIENTATION: RIGHT

## 2024-06-14 ASSESSMENT — PAIN - FUNCTIONAL ASSESSMENT
PAIN_FUNCTIONAL_ASSESSMENT: PREVENTS OR INTERFERES WITH MANY ACTIVE NOT PASSIVE ACTIVITIES
PAIN_FUNCTIONAL_ASSESSMENT: PREVENTS OR INTERFERES WITH MANY ACTIVE NOT PASSIVE ACTIVITIES

## 2024-06-14 ASSESSMENT — PAIN DESCRIPTION - LOCATION
LOCATION: WRIST
LOCATION: ARM
LOCATION: ARM

## 2024-06-14 NOTE — PLAN OF CARE
Problem: Discharge Planning  Goal: Discharge to home or other facility with appropriate resources  Outcome: Progressing  Flowsheets (Taken 6/13/2024 2115)  Discharge to home or other facility with appropriate resources:   Identify barriers to discharge with patient and caregiver   Arrange for needed discharge resources and transportation as appropriate   Identify discharge learning needs (meds, wound care, etc)   Arrange for interpreters to assist at discharge as needed   Refer to discharge planning if patient needs post-hospital services based on physician order or complex needs related to functional status, cognitive ability or social support system       Problem: Pain  Goal: Verbalizes/displays adequate comfort level or baseline comfort level  Outcome: Progressing, Patient expresses relief following administration of prn pain medication.      Problem: Safety - Adult  Goal: Free from fall injury  Outcome: Progressing, Patient remains free of incidence/ injury. Bed remains in low position. Side rails up x2.        Problem: Skin/Tissue Integrity  Goal: Absence of new skin breakdown  Description: 1.  Monitor for areas of redness and/or skin breakdown  2.  Assess vascular access sites hourly  3.  Every 4-6 hours minimum:  Change oxygen saturation probe site  4.  Every 4-6 hours:  If on nasal continuous positive airway pressure, respiratory therapy assess nares and determine need for appliance change or resting period.  Outcome: Progressing

## 2024-06-14 NOTE — CARE COORDINATION
Call placed to Yodit at [facility] is still pending at this time. Updated clinicals were submitted yesterday.

## 2024-06-15 PROBLEM — S62.101A CLOSED FRACTURE OF RIGHT WRIST: Status: ACTIVE | Noted: 2024-06-15
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
## 2024-06-15 LAB
GLUCOSE BLD-MCNC: 155 MG/DL (ref 65–105)
GLUCOSE BLD-MCNC: 174 MG/DL (ref 65–105)
GLUCOSE BLD-MCNC: 175 MG/DL (ref 65–105)
GLUCOSE BLD-MCNC: 476 MG/DL (ref 65–105)

## 2024-06-15 PROCEDURE — 6370000000 HC RX 637 (ALT 250 FOR IP): Performed by: ORTHOPAEDIC SURGERY

## 2024-06-15 PROCEDURE — 99232 SBSQ HOSP IP/OBS MODERATE 35: CPT | Performed by: FAMILY MEDICINE

## 2024-06-15 PROCEDURE — 2580000003 HC RX 258: Performed by: ORTHOPAEDIC SURGERY

## 2024-06-15 PROCEDURE — 1200000000 HC SEMI PRIVATE

## 2024-06-15 PROCEDURE — 6360000002 HC RX W HCPCS: Performed by: FAMILY MEDICINE

## 2024-06-15 PROCEDURE — 6370000000 HC RX 637 (ALT 250 FOR IP): Performed by: FAMILY MEDICINE

## 2024-06-15 PROCEDURE — 82947 ASSAY GLUCOSE BLOOD QUANT: CPT

## 2024-06-15 RX ADMIN — INSULIN LISPRO 20 UNITS: 100 INJECTION, SOLUTION INTRAVENOUS; SUBCUTANEOUS at 11:44

## 2024-06-15 RX ADMIN — PRASUGREL 10 MG: 10 TABLET, FILM COATED ORAL at 08:59

## 2024-06-15 RX ADMIN — HEPARIN SODIUM 5000 UNITS: 5000 INJECTION INTRAVENOUS; SUBCUTANEOUS at 15:00

## 2024-06-15 RX ADMIN — BUSPIRONE HYDROCHLORIDE 10 MG: 10 TABLET ORAL at 08:58

## 2024-06-15 RX ADMIN — DOCUSATE SODIUM 100 MG: 100 CAPSULE, LIQUID FILLED ORAL at 08:59

## 2024-06-15 RX ADMIN — BUSPIRONE HYDROCHLORIDE 10 MG: 10 TABLET ORAL at 15:00

## 2024-06-15 RX ADMIN — SODIUM CHLORIDE, PRESERVATIVE FREE 10 ML: 5 INJECTION INTRAVENOUS at 09:17

## 2024-06-15 RX ADMIN — ASPIRIN 81 MG 81 MG: 81 TABLET ORAL at 08:59

## 2024-06-15 RX ADMIN — POTASSIUM CHLORIDE 10 MEQ: 750 CAPSULE, EXTENDED RELEASE ORAL at 20:45

## 2024-06-15 RX ADMIN — INSULIN LISPRO 8 UNITS: 100 INJECTION, SOLUTION INTRAVENOUS; SUBCUTANEOUS at 11:45

## 2024-06-15 RX ADMIN — SEVELAMER CARBONATE 1600 MG: 800 TABLET, FILM COATED ORAL at 08:58

## 2024-06-15 RX ADMIN — DOCUSATE SODIUM 100 MG: 100 CAPSULE, LIQUID FILLED ORAL at 20:46

## 2024-06-15 RX ADMIN — FERROUS SULFATE TAB 325 MG (65 MG ELEMENTAL FE) 325 MG: 325 (65 FE) TAB at 08:58

## 2024-06-15 RX ADMIN — CARVEDILOL 3.12 MG: 3.12 TABLET, FILM COATED ORAL at 08:59

## 2024-06-15 RX ADMIN — BUMETANIDE 3 MG: 1 TABLET ORAL at 20:46

## 2024-06-15 RX ADMIN — BUMETANIDE 3 MG: 1 TABLET ORAL at 08:58

## 2024-06-15 RX ADMIN — SODIUM CHLORIDE, PRESERVATIVE FREE 10 ML: 5 INJECTION INTRAVENOUS at 20:47

## 2024-06-15 RX ADMIN — HYDROCODONE BITARTRATE AND ACETAMINOPHEN 1 TABLET: 5; 325 TABLET ORAL at 21:08

## 2024-06-15 RX ADMIN — SEVELAMER CARBONATE 1600 MG: 800 TABLET, FILM COATED ORAL at 11:44

## 2024-06-15 RX ADMIN — INSULIN GLARGINE 42 UNITS: 100 INJECTION, SOLUTION SUBCUTANEOUS at 20:50

## 2024-06-15 RX ADMIN — ALLOPURINOL 100 MG: 100 TABLET ORAL at 08:58

## 2024-06-15 RX ADMIN — VENLAFAXINE HYDROCHLORIDE 75 MG: 75 CAPSULE, EXTENDED RELEASE ORAL at 08:59

## 2024-06-15 RX ADMIN — Medication 400 MG: at 20:46

## 2024-06-15 RX ADMIN — BUSPIRONE HYDROCHLORIDE 10 MG: 10 TABLET ORAL at 20:45

## 2024-06-15 RX ADMIN — SODIUM BICARBONATE 1300 MG: 650 TABLET ORAL at 08:59

## 2024-06-15 RX ADMIN — SEVELAMER CARBONATE 1600 MG: 800 TABLET, FILM COATED ORAL at 16:57

## 2024-06-15 RX ADMIN — HEPARIN SODIUM 5000 UNITS: 5000 INJECTION INTRAVENOUS; SUBCUTANEOUS at 20:46

## 2024-06-15 RX ADMIN — Medication 10.5 MG: at 21:09

## 2024-06-15 RX ADMIN — POTASSIUM CHLORIDE 10 MEQ: 750 CAPSULE, EXTENDED RELEASE ORAL at 08:58

## 2024-06-15 RX ADMIN — CALCIUM POLYCARBOPHIL 1250 MG: 625 TABLET ORAL at 20:45

## 2024-06-15 RX ADMIN — INSULIN LISPRO 20 UNITS: 100 INJECTION, SOLUTION INTRAVENOUS; SUBCUTANEOUS at 16:57

## 2024-06-15 RX ADMIN — CARVEDILOL 3.12 MG: 3.12 TABLET, FILM COATED ORAL at 20:46

## 2024-06-15 RX ADMIN — CALCIUM POLYCARBOPHIL 1250 MG: 625 TABLET ORAL at 09:00

## 2024-06-15 RX ADMIN — INSULIN LISPRO 20 UNITS: 100 INJECTION, SOLUTION INTRAVENOUS; SUBCUTANEOUS at 09:13

## 2024-06-15 RX ADMIN — HEPARIN SODIUM 5000 UNITS: 5000 INJECTION INTRAVENOUS; SUBCUTANEOUS at 06:01

## 2024-06-15 RX ADMIN — SODIUM BICARBONATE 1300 MG: 650 TABLET ORAL at 20:45

## 2024-06-15 RX ADMIN — ATORVASTATIN CALCIUM 80 MG: 80 TABLET, FILM COATED ORAL at 20:46

## 2024-06-15 RX ADMIN — INSULIN GLARGINE 72 UNITS: 100 INJECTION, SOLUTION SUBCUTANEOUS at 08:55

## 2024-06-15 ASSESSMENT — PAIN DESCRIPTION - DESCRIPTORS
DESCRIPTORS: ACHING
DESCRIPTORS: ACHING;THROBBING
DESCRIPTORS: ACHING

## 2024-06-15 ASSESSMENT — PAIN SCALES - GENERAL
PAINLEVEL_OUTOF10: 4
PAINLEVEL_OUTOF10: 0
PAINLEVEL_OUTOF10: 3
PAINLEVEL_OUTOF10: 8

## 2024-06-15 ASSESSMENT — PAIN DESCRIPTION - ORIENTATION
ORIENTATION: RIGHT

## 2024-06-15 ASSESSMENT — PAIN DESCRIPTION - PAIN TYPE
TYPE: ACUTE PAIN
TYPE: ACUTE PAIN

## 2024-06-15 ASSESSMENT — PAIN DESCRIPTION - LOCATION
LOCATION: WRIST

## 2024-06-15 NOTE — PLAN OF CARE
Problem: Discharge Planning  Goal: Discharge to home or other facility with appropriate resources  6/14/2024 2358 by Marti Chaudhry RN  Outcome: Progressing  Flowsheets (Taken 6/13/2024 2115 by Sophie Carrillo, RN)  Discharge to home or other facility with appropriate resources:   Identify barriers to discharge with patient and caregiver   Arrange for needed discharge resources and transportation as appropriate   Identify discharge learning needs (meds, wound care, etc)   Arrange for interpreters to assist at discharge as needed   Refer to discharge planning if patient needs post-hospital services based on physician order or complex needs related to functional status, cognitive ability or social support system  6/14/2024 1703 by Elsa Pruitt RN  Outcome: Progressing     Problem: Pain  Goal: Verbalizes/displays adequate comfort level or baseline comfort level  6/14/2024 2358 by Marti Chaudhry RN  Outcome: Progressing  Flowsheets (Taken 6/14/2024 2358)  Verbalizes/displays adequate comfort level or baseline comfort level:   Encourage patient to monitor pain and request assistance   Administer analgesics based on type and severity of pain and evaluate response   Consider cultural and social influences on pain and pain management   Assess pain using appropriate pain scale   Implement non-pharmacological measures as appropriate and evaluate response   Notify Licensed Independent Practitioner if interventions unsuccessful or patient reports new pain  6/14/2024 1703 by Elsa Pruitt RN  Outcome: Progressing     Problem: Safety - Adult  Goal: Free from fall injury  6/14/2024 2358 by Marti Chaudhry RN  Outcome: Progressing  Flowsheets (Taken 6/14/2024 2358)  Free From Fall Injury: Instruct family/caregiver on patient safety  6/14/2024 1703 by Elsa Pruitt RN  Outcome: Progressing     Problem: Skin/Tissue Integrity  Goal: Absence of new skin breakdown  Description: 1.  Monitor for areas of redness

## 2024-06-15 NOTE — PLAN OF CARE

## 2024-06-16 LAB
GLUCOSE BLD-MCNC: 155 MG/DL (ref 65–105)
GLUCOSE BLD-MCNC: 166 MG/DL (ref 65–105)
GLUCOSE BLD-MCNC: 173 MG/DL (ref 65–105)
GLUCOSE BLD-MCNC: 304 MG/DL (ref 65–105)

## 2024-06-16 PROCEDURE — 1200000000 HC SEMI PRIVATE

## 2024-06-16 PROCEDURE — 2580000003 HC RX 258: Performed by: ORTHOPAEDIC SURGERY

## 2024-06-16 PROCEDURE — 82947 ASSAY GLUCOSE BLOOD QUANT: CPT

## 2024-06-16 PROCEDURE — 6370000000 HC RX 637 (ALT 250 FOR IP): Performed by: FAMILY MEDICINE

## 2024-06-16 PROCEDURE — 99232 SBSQ HOSP IP/OBS MODERATE 35: CPT | Performed by: FAMILY MEDICINE

## 2024-06-16 PROCEDURE — 6370000000 HC RX 637 (ALT 250 FOR IP): Performed by: ORTHOPAEDIC SURGERY

## 2024-06-16 PROCEDURE — 6360000002 HC RX W HCPCS: Performed by: FAMILY MEDICINE

## 2024-06-16 RX ADMIN — BUSPIRONE HYDROCHLORIDE 10 MG: 10 TABLET ORAL at 20:52

## 2024-06-16 RX ADMIN — BUSPIRONE HYDROCHLORIDE 10 MG: 10 TABLET ORAL at 13:17

## 2024-06-16 RX ADMIN — CALCIUM POLYCARBOPHIL 1250 MG: 625 TABLET ORAL at 21:00

## 2024-06-16 RX ADMIN — POTASSIUM CHLORIDE 10 MEQ: 750 CAPSULE, EXTENDED RELEASE ORAL at 09:30

## 2024-06-16 RX ADMIN — HEPARIN SODIUM 5000 UNITS: 5000 INJECTION INTRAVENOUS; SUBCUTANEOUS at 13:18

## 2024-06-16 RX ADMIN — FERROUS SULFATE TAB 325 MG (65 MG ELEMENTAL FE) 325 MG: 325 (65 FE) TAB at 09:30

## 2024-06-16 RX ADMIN — ATORVASTATIN CALCIUM 80 MG: 80 TABLET, FILM COATED ORAL at 20:53

## 2024-06-16 RX ADMIN — SODIUM CHLORIDE, PRESERVATIVE FREE 10 ML: 5 INJECTION INTRAVENOUS at 20:54

## 2024-06-16 RX ADMIN — Medication 400 MG: at 20:53

## 2024-06-16 RX ADMIN — CARVEDILOL 3.12 MG: 3.12 TABLET, FILM COATED ORAL at 09:30

## 2024-06-16 RX ADMIN — ASPIRIN 81 MG 81 MG: 81 TABLET ORAL at 09:30

## 2024-06-16 RX ADMIN — HEPARIN SODIUM 5000 UNITS: 5000 INJECTION INTRAVENOUS; SUBCUTANEOUS at 20:53

## 2024-06-16 RX ADMIN — SODIUM BICARBONATE 1300 MG: 650 TABLET ORAL at 09:29

## 2024-06-16 RX ADMIN — INSULIN GLARGINE 72 UNITS: 100 INJECTION, SOLUTION SUBCUTANEOUS at 09:31

## 2024-06-16 RX ADMIN — POTASSIUM CHLORIDE 10 MEQ: 750 CAPSULE, EXTENDED RELEASE ORAL at 20:52

## 2024-06-16 RX ADMIN — SEVELAMER CARBONATE 1600 MG: 800 TABLET, FILM COATED ORAL at 11:35

## 2024-06-16 RX ADMIN — BUMETANIDE 3 MG: 1 TABLET ORAL at 20:52

## 2024-06-16 RX ADMIN — CALCIUM POLYCARBOPHIL 1250 MG: 625 TABLET ORAL at 09:29

## 2024-06-16 RX ADMIN — HYDROCODONE BITARTRATE AND ACETAMINOPHEN 1 TABLET: 5; 325 TABLET ORAL at 20:52

## 2024-06-16 RX ADMIN — DOCUSATE SODIUM 100 MG: 100 CAPSULE, LIQUID FILLED ORAL at 20:52

## 2024-06-16 RX ADMIN — INSULIN LISPRO 20 UNITS: 100 INJECTION, SOLUTION INTRAVENOUS; SUBCUTANEOUS at 09:30

## 2024-06-16 RX ADMIN — BUSPIRONE HYDROCHLORIDE 10 MG: 10 TABLET ORAL at 09:30

## 2024-06-16 RX ADMIN — INSULIN GLARGINE 42 UNITS: 100 INJECTION, SOLUTION SUBCUTANEOUS at 20:51

## 2024-06-16 RX ADMIN — DOCUSATE SODIUM 100 MG: 100 CAPSULE, LIQUID FILLED ORAL at 09:30

## 2024-06-16 RX ADMIN — CARVEDILOL 3.12 MG: 3.12 TABLET, FILM COATED ORAL at 20:53

## 2024-06-16 RX ADMIN — Medication 10.5 MG: at 20:51

## 2024-06-16 RX ADMIN — INSULIN LISPRO 6 UNITS: 100 INJECTION, SOLUTION INTRAVENOUS; SUBCUTANEOUS at 11:35

## 2024-06-16 RX ADMIN — SODIUM BICARBONATE 1300 MG: 650 TABLET ORAL at 20:52

## 2024-06-16 RX ADMIN — INSULIN LISPRO 20 UNITS: 100 INJECTION, SOLUTION INTRAVENOUS; SUBCUTANEOUS at 18:28

## 2024-06-16 RX ADMIN — INSULIN LISPRO 20 UNITS: 100 INJECTION, SOLUTION INTRAVENOUS; SUBCUTANEOUS at 11:34

## 2024-06-16 RX ADMIN — HEPARIN SODIUM 5000 UNITS: 5000 INJECTION INTRAVENOUS; SUBCUTANEOUS at 06:04

## 2024-06-16 RX ADMIN — VENLAFAXINE HYDROCHLORIDE 75 MG: 75 CAPSULE, EXTENDED RELEASE ORAL at 09:30

## 2024-06-16 RX ADMIN — SEVELAMER CARBONATE 1600 MG: 800 TABLET, FILM COATED ORAL at 09:30

## 2024-06-16 RX ADMIN — ALLOPURINOL 100 MG: 100 TABLET ORAL at 09:30

## 2024-06-16 RX ADMIN — SODIUM CHLORIDE, PRESERVATIVE FREE 10 ML: 5 INJECTION INTRAVENOUS at 09:31

## 2024-06-16 RX ADMIN — SEVELAMER CARBONATE 1600 MG: 800 TABLET, FILM COATED ORAL at 18:28

## 2024-06-16 RX ADMIN — PRASUGREL 10 MG: 10 TABLET, FILM COATED ORAL at 11:35

## 2024-06-16 RX ADMIN — BUMETANIDE 3 MG: 1 TABLET ORAL at 09:29

## 2024-06-16 ASSESSMENT — PAIN DESCRIPTION - DESCRIPTORS: DESCRIPTORS: ACHING

## 2024-06-16 ASSESSMENT — PAIN SCALES - GENERAL
PAINLEVEL_OUTOF10: 0
PAINLEVEL_OUTOF10: 8

## 2024-06-16 ASSESSMENT — PAIN DESCRIPTION - LOCATION: LOCATION: HAND

## 2024-06-16 ASSESSMENT — PAIN DESCRIPTION - ORIENTATION: ORIENTATION: MID;RIGHT

## 2024-06-16 NOTE — PLAN OF CARE
Problem: Discharge Planning  Goal: Discharge to home or other facility with appropriate resources  6/16/2024 0506 by Elsa Pruitt, RN  Outcome: Progressing     Problem: Pain  Goal: Verbalizes/displays adequate comfort level or baseline comfort level  6/16/2024 0506 by Elsa Pruitt, RN  Outcome: Progressing     Problem: Safety - Adult  Goal: Free from fall injury  6/16/2024 0506 by Elsa Pruitt, RN  Outcome: Progressing

## 2024-06-16 NOTE — CARE COORDINATION
ONGOING DISCHARGE PLAN:    Per LSW notes auth was started on 6/11 w/ updated clinicals sent on 6/13. Insurance approval still pending for [place] [facility] [place].    Ortho consult-right distal radius/ulnar fracture  Splint for 2 weeks then brace/short arm cast for 4 weeks.    ESRD    PT/OT    Will continue to follow for additional discharge needs.    If patient is discharged prior to next notation, then this note serves as note for discharge by case management.    Electronically signed by Jasmin Pfeiffer RN on 6/16/2024 at 7:47 AM

## 2024-06-16 NOTE — PLAN OF CARE
Problem: Discharge Planning  Goal: Discharge to home or other facility with appropriate resources  6/16/2024 1433 by Azul Ospina RN  Outcome: Progressing  6/16/2024 0506 by Elsa Pruitt RN  Outcome: Progressing     Problem: Pain  Goal: Verbalizes/displays adequate comfort level or baseline comfort level  6/16/2024 1433 by Azul Ospina RN  Outcome: Progressing  6/16/2024 0506 by Elsa Pruitt RN  Outcome: Progressing     Problem: Safety - Adult  Goal: Free from fall injury  6/16/2024 1433 by Azul Ospina RN  Outcome: Progressing  6/16/2024 0506 by Elsa Pruitt RN  Outcome: Progressing     Problem: Skin/Tissue Integrity  Goal: Absence of new skin breakdown  Description: 1.  Monitor for areas of redness and/or skin breakdown  2.  Assess vascular access sites hourly  3.  Every 4-6 hours minimum:  Change oxygen saturation probe site  4.  Every 4-6 hours:  If on nasal continuous positive airway pressure, respiratory therapy assess nares and determine need for appliance change or resting period.  Outcome: Progressing     Problem: Chronic Conditions and Co-morbidities  Goal: Patient's chronic conditions and co-morbidity symptoms are monitored and maintained or improved  Outcome: Progressing     Problem: ABCDS Injury Assessment  Goal: Absence of physical injury  Outcome: Progressing

## 2024-06-17 VITALS
WEIGHT: 243.61 LBS | DIASTOLIC BLOOD PRESSURE: 49 MMHG | RESPIRATION RATE: 16 BRPM | BODY MASS INDEX: 40.59 KG/M2 | HEIGHT: 65 IN | OXYGEN SATURATION: 95 % | TEMPERATURE: 98.2 F | SYSTOLIC BLOOD PRESSURE: 125 MMHG | HEART RATE: 59 BPM

## 2024-06-17 LAB
ANION GAP SERPL CALCULATED.3IONS-SCNC: 16 MMOL/L (ref 9–17)
BUN SERPL-MCNC: 54 MG/DL (ref 8–23)
CALCIUM SERPL-MCNC: 9 MG/DL (ref 8.6–10.4)
CHLORIDE SERPL-SCNC: 92 MMOL/L (ref 98–107)
CO2 SERPL-SCNC: 23 MMOL/L (ref 20–31)
CREAT SERPL-MCNC: 4.6 MG/DL (ref 0.5–0.9)
GFR, ESTIMATED: 10 ML/MIN/1.73M2
GLUCOSE BLD-MCNC: 110 MG/DL (ref 65–105)
GLUCOSE BLD-MCNC: 126 MG/DL (ref 65–105)
GLUCOSE BLD-MCNC: 160 MG/DL (ref 65–105)
GLUCOSE BLD-MCNC: 70 MG/DL (ref 65–105)
GLUCOSE BLD-MCNC: 74 MG/DL (ref 65–105)
GLUCOSE SERPL-MCNC: 331 MG/DL (ref 70–99)
HCT VFR BLD AUTO: 29.2 % (ref 36–46)
HGB BLD-MCNC: 10.2 G/DL (ref 12–16)
POTASSIUM SERPL-SCNC: 4 MMOL/L (ref 3.7–5.3)
SODIUM SERPL-SCNC: 131 MMOL/L (ref 135–144)

## 2024-06-17 PROCEDURE — 82947 ASSAY GLUCOSE BLOOD QUANT: CPT

## 2024-06-17 PROCEDURE — 6370000000 HC RX 637 (ALT 250 FOR IP): Performed by: FAMILY MEDICINE

## 2024-06-17 PROCEDURE — 99232 SBSQ HOSP IP/OBS MODERATE 35: CPT | Performed by: FAMILY MEDICINE

## 2024-06-17 PROCEDURE — 85018 HEMOGLOBIN: CPT

## 2024-06-17 PROCEDURE — 36415 COLL VENOUS BLD VENIPUNCTURE: CPT

## 2024-06-17 PROCEDURE — 80048 BASIC METABOLIC PNL TOTAL CA: CPT

## 2024-06-17 PROCEDURE — 90935 HEMODIALYSIS ONE EVALUATION: CPT

## 2024-06-17 PROCEDURE — 2580000003 HC RX 258: Performed by: ORTHOPAEDIC SURGERY

## 2024-06-17 PROCEDURE — 85014 HEMATOCRIT: CPT

## 2024-06-17 PROCEDURE — 97535 SELF CARE MNGMENT TRAINING: CPT

## 2024-06-17 PROCEDURE — 6360000002 HC RX W HCPCS: Performed by: FAMILY MEDICINE

## 2024-06-17 RX ORDER — ERYTHROPOIETIN 3000 [IU]/ML
3000 INJECTION, SOLUTION INTRAVENOUS; SUBCUTANEOUS
Qty: 3000 EACH | Refills: 0
Start: 2024-06-17

## 2024-06-17 RX ADMIN — FERROUS SULFATE TAB 325 MG (65 MG ELEMENTAL FE) 325 MG: 325 (65 FE) TAB at 08:58

## 2024-06-17 RX ADMIN — ASPIRIN 81 MG 81 MG: 81 TABLET ORAL at 08:59

## 2024-06-17 RX ADMIN — INSULIN LISPRO 20 UNITS: 100 INJECTION, SOLUTION INTRAVENOUS; SUBCUTANEOUS at 15:55

## 2024-06-17 RX ADMIN — VENLAFAXINE HYDROCHLORIDE 75 MG: 75 CAPSULE, EXTENDED RELEASE ORAL at 08:58

## 2024-06-17 RX ADMIN — SEVELAMER CARBONATE 1600 MG: 800 TABLET, FILM COATED ORAL at 15:55

## 2024-06-17 RX ADMIN — DOCUSATE SODIUM 100 MG: 100 CAPSULE, LIQUID FILLED ORAL at 08:58

## 2024-06-17 RX ADMIN — INSULIN GLARGINE 72 UNITS: 100 INJECTION, SOLUTION SUBCUTANEOUS at 08:59

## 2024-06-17 RX ADMIN — PRASUGREL 10 MG: 10 TABLET, FILM COATED ORAL at 09:01

## 2024-06-17 RX ADMIN — HEPARIN SODIUM 5000 UNITS: 5000 INJECTION INTRAVENOUS; SUBCUTANEOUS at 06:02

## 2024-06-17 RX ADMIN — BUSPIRONE HYDROCHLORIDE 10 MG: 10 TABLET ORAL at 15:55

## 2024-06-17 RX ADMIN — SODIUM BICARBONATE 1300 MG: 650 TABLET ORAL at 08:58

## 2024-06-17 RX ADMIN — ACETAMINOPHEN 650 MG: 325 TABLET ORAL at 16:03

## 2024-06-17 RX ADMIN — BUMETANIDE 3 MG: 1 TABLET ORAL at 08:57

## 2024-06-17 RX ADMIN — SEVELAMER CARBONATE 1600 MG: 800 TABLET, FILM COATED ORAL at 08:58

## 2024-06-17 RX ADMIN — ALLOPURINOL 100 MG: 100 TABLET ORAL at 08:58

## 2024-06-17 RX ADMIN — SODIUM CHLORIDE, PRESERVATIVE FREE 10 ML: 5 INJECTION INTRAVENOUS at 09:10

## 2024-06-17 RX ADMIN — POTASSIUM CHLORIDE 10 MEQ: 750 CAPSULE, EXTENDED RELEASE ORAL at 08:58

## 2024-06-17 RX ADMIN — CALCIUM POLYCARBOPHIL 1250 MG: 625 TABLET ORAL at 09:01

## 2024-06-17 RX ADMIN — CARVEDILOL 3.12 MG: 3.12 TABLET, FILM COATED ORAL at 08:58

## 2024-06-17 RX ADMIN — BUSPIRONE HYDROCHLORIDE 10 MG: 10 TABLET ORAL at 08:58

## 2024-06-17 RX ADMIN — INSULIN LISPRO 20 UNITS: 100 INJECTION, SOLUTION INTRAVENOUS; SUBCUTANEOUS at 09:00

## 2024-06-17 ASSESSMENT — PAIN SCALES - GENERAL
PAINLEVEL_OUTOF10: 8
PAINLEVEL_OUTOF10: 8

## 2024-06-17 ASSESSMENT — PAIN DESCRIPTION - LOCATION
LOCATION: ARM
LOCATION: ARM

## 2024-06-17 ASSESSMENT — PAIN DESCRIPTION - ORIENTATION
ORIENTATION: RIGHT
ORIENTATION: RIGHT

## 2024-06-17 ASSESSMENT — PAIN DESCRIPTION - PAIN TYPE
TYPE: ACUTE PAIN
TYPE: ACUTE PAIN

## 2024-06-17 NOTE — CARE COORDINATION
VM left for Yodit in admissions at [facility] to follow up on status of pre cert

## 2024-06-17 NOTE — PLAN OF CARE
Problem: Discharge Planning  Goal: Discharge to home or other facility with appropriate resources  6/16/2024 2022 by Elsa Pruitt, RN  Outcome: Progressing     Problem: Pain  Goal: Verbalizes/displays adequate comfort level or baseline comfort level  6/16/2024 2022 by Elsa Pruitt, RN  Outcome: Progressing     Problem: Safety - Adult  Goal: Free from fall injury  6/16/2024 2022 by Elsa Pruitt, RN  Outcome: Progressing

## 2024-06-17 NOTE — CARE COORDINATION
Call from Karla at facility, insurance authorization approved. Good through 6/21    Trans[facility] informed. Bedside nurse informed.     Number for Report: [phone]

## 2024-06-17 NOTE — PLAN OF CARE
Problem: Discharge Planning  Goal: Discharge to home or other facility with appropriate resources  6/17/2024 1736 by Cody Whitney RN  Outcome: Adequate for Discharge  6/17/2024 0425 by Elsa Pruitt RN  Outcome: Progressing     Problem: Pain  Goal: Verbalizes/displays adequate comfort level or baseline comfort level  6/17/2024 1736 by Cody Whitney RN  Outcome: Adequate for Discharge  6/17/2024 1735 by Cody Whitney RN  Outcome: Progressing  Note: .ppp  6/17/2024 0425 by Elsa Pruitt RN  Outcome: Progressing     Problem: Safety - Adult  Goal: Free from fall injury  6/17/2024 1736 by Cody Whitney RN  Outcome: Adequate for Discharge  6/17/2024 0425 by Elsa Pruitt RN  Outcome: Progressing     Problem: Skin/Tissue Integrity  Goal: Absence of new skin breakdown  Description: 1.  Monitor for areas of redness and/or skin breakdown  2.  Assess vascular access sites hourly  3.  Every 4-6 hours minimum:  Change oxygen saturation probe site  4.  Every 4-6 hours:  If on nasal continuous positive airway pressure, respiratory therapy assess nares and determine need for appliance change or resting period.  Outcome: Adequate for Discharge     Problem: Chronic Conditions and Co-morbidities  Goal: Patient's chronic conditions and co-morbidity symptoms are monitored and maintained or improved  Outcome: Adequate for Discharge     Problem: ABCDS Injury Assessment  Goal: Absence of physical injury  Outcome: Adequate for Discharge

## 2024-06-17 NOTE — PROGRESS NOTES
Nutrition Note    Pt well known to this service from previous admits. She eats well and has no complaints. Elevated POC Glu noted 476. Will add 5 carbohydrate choice restriction to diet. Pt is awaiting placement.     Electronically signed by Cary Ziegler RD, LD on 6/16/24 at 2:50 PM EDT    Contact: [phone]    
    Department of Internal Medicine  Nephrology Aneudy Jurado MD  Progress Note    Reason for consultation: Management of hemodialysis dependent end-stage renal disease.    Consulting physician: Nima Lynn MD.    Interval history:   Patient was seen and examined today during dialysis, she does not have any acute complaints.  Pain control is adequate.        History of present illness: This is a 64 y.o. female with a significant past medical history of Systemic hypertension, osteoarthritis,  coronary artery disease [s/p CABG with subsequent graft stent], s/p cerebrovascular accident, type 2 diabetes mellitus and End-stage renal disease secondary to hypertensive and diabetic nephropathy [on routine hemodialysis Monday/Wednesday/Friday at [facility] dialysis unit [facility] urinary care of Dr. Graham using left arm AV fistula], who presented to the emergency department at [facility] for evaluation  after sustaining a fall from a bed landing on her right side. She was complaining of right upper extremity and right hip pain.  Vital signs were stable with blood pressure 118/68 mmHg.    CT scan of the head was negative.  Right wrist x-ray showed nondisplaced and mildly dorsally impacted distal radius fracture with faint linear lucency and focal complexity of the cortex posteriorly suggesting mild buckling of the cortex. Right hip x-ray showed no fracture    Scheduled Meds:   epoetin raphael  3,000 Units SubCUTAneous Once per day on Mon Wed Fri    heparin (porcine)  5,000 Units SubCUTAneous 3 times per day    sodium chloride flush  5-40 mL IntraVENous 2 times per day    allopurinol  100 mg Oral Daily    aspirin  81 mg Oral Daily    atorvastatin  80 mg Oral Nightly    bumetanide  3 mg Oral BID    busPIRone  10 mg Oral TID    polycarbophil  1,250 mg Oral BID    carvedilol  3.125 mg Oral BID    docusate sodium  100 mg Oral BID    ferrous sulfate  325 mg Oral Daily    insulin lispro  20 Units SubCUTAneous TID WC 
    Department of Internal Medicine  Nephrology Aneudy Jurado MD  Progress Note    Reason for consultation: Management of hemodialysis dependent end-stage renal disease.    Consulting physician: Nima Lynn MD.    Interval history: Patient was seen and examined today during dialysis and she does not have any acute complaints.  Pain control is adequate.        History of present illness: This is a 64 y.o. female with a significant past medical history of Systemic hypertension, osteoarthritis,  coronary artery disease [s/p CABG with subsequent graft stent], s/p cerebrovascular accident, type 2 diabetes mellitus and End-stage renal disease secondary to hypertensive and diabetic nephropathy [on routine hemodialysis Monday/Wednesday/Friday at [facility] dialysis unit [facility] care of Dr. Graham using left arm AV fistula], who presented to the emergency department at [facility] for evaluation  after sustaining a fall from a bed landing on her right side. She was complaining of right upper extremity and right hip pain.  Vital signs were stable with blood pressure 118/68 mmHg.    CT scan of the head was negative.  Right wrist x-ray showed nondisplaced and mildly dorsally impacted distal radius fracture with faint linear lucency and focal complexity of the cortex posteriorly suggesting mild buckling of the cortex. Right hip x-ray showed no fracture    Scheduled Meds:   epoetin raphael-epbx  3,000 Units SubCUTAneous Once per day on Mon Wed Fri    heparin (porcine)  5,000 Units SubCUTAneous 3 times per day    sodium chloride flush  5-40 mL IntraVENous 2 times per day    allopurinol  100 mg Oral Daily    aspirin  81 mg Oral Daily    atorvastatin  80 mg Oral Nightly    bumetanide  3 mg Oral BID    busPIRone  10 mg Oral TID    polycarbophil  1,250 mg Oral BID    carvedilol  3.125 mg Oral BID    docusate sodium  100 mg Oral BID    ferrous sulfate  325 mg Oral Daily    insulin lispro  20 Units SubCUTAneous 
    Department of Internal Medicine  Nephrology Cherri Landis MD  Progress Note    Reason for consultation: Management of hemodialysis dependent end-stage renal disease.    Consulting physician: Nima Lynn MD.    Interval history: Patient was seen and examined today and pain control is fair.  She does not have shortness of breath or chest pain.  She tolerated hemodialysis well yesterday.    History of present illness: This is a 64 y.o. female with a significant past medical history of Systemic hypertension, osteoarthritis,  coronary artery disease [s/p CABG with subsequent graft stent], s/p cerebrovascular accident, type 2 diabetes mellitus and End-stage renal disease secondary to hypertensive and diabetic nephropathy [on routine hemodialysis Monday/Wednesday/Friday at [facility] dialysis unit [facility] care of Dr. Graham using left arm AV fistula], who presented to the emergency department at [facility] for evaluation  after sustaining a fall from a bed landing on her right side. She was complaining of right upper extremity and right hip pain.  Vital signs were stable with blood pressure 118/68 mmHg.    CT scan of the head was negative.  Right wrist x-ray showed nondisplaced and mildly dorsally impacted distal radius fracture with faint linear lucency and focal complexity of the cortex posteriorly suggesting mild buckling of the cortex. Right hip x-ray showed no fracture    Scheduled Meds:   heparin (porcine)  5,000 Units SubCUTAneous 3 times per day    sodium chloride flush  5-40 mL IntraVENous 2 times per day    allopurinol  100 mg Oral Daily    aspirin  81 mg Oral Daily    atorvastatin  80 mg Oral Nightly    bumetanide  3 mg Oral BID    busPIRone  10 mg Oral TID    polycarbophil  1,250 mg Oral BID    carvedilol  3.125 mg Oral BID    docusate sodium  100 mg Oral BID    epoetin raphael-epbx  3,000 Units SubCUTAneous Once per day on Mon Wed Fri    ferrous sulfate  325 mg Oral Daily    insulin 
   06/12/24 1253   Encounter Summary   Encounter Overview/Reason Volunteer Encounter   Service Provided For Patient   Referral/Consult From Rounding   Last Encounter  06/12/24   Complexity of Encounter Low   Spiritual/Emotional needs   Type Spiritual Support   Rituals, Rites and Sacraments   Type Latter day Communion   Assessment/Intervention/Outcome   Intervention Prayer (assurance of)/[place]       
   06/12/24 1518   Encounter Summary   Encounter Overview/Reason  Encounter   Service Provided For Patient   Last Encounter  06/12/24   Rituals, Rites and Sacraments   Type Sacrament of Sick       
   06/13/24 1337   Encounter Summary   Encounter Overview/Reason Volunteer Encounter   Service Provided For Patient   Last Encounter  06/13/24   Rituals, Rites and Sacraments   Type Yazidi Communion       
   06/14/24 1713   Encounter Summary   Encounter Overview/Reason Volunteer Encounter   Service Provided For Patient   Last Encounter  06/14/24   Rituals, Rites and Sacraments   Type Buddhist Communion   Assessment/Intervention/Outcome   Intervention Prayer (assurance of)/[place]       
  HEMODIALYSIS PRE-TREATMENT NOTE    Patient Identifiers prior to treatment:  MRN     Isolation Required: Multiple                      Isolation Type: Contact       (please document if patient is being managed as a PUI/COVID-19 patient)        Hepatitis status:                           Date Drawn                             Result  Hepatitis B Surface Antigen 3/8/24     neg                     Hepatitis B Surface Antibody 3/8/24 Pos     Greater than 10   Hepatitis B Core Antibody - -          How was Hepatitis Status verified: Epic     Was a copy of the labs you documented provided to facility for the patient's chart: yes    Hemodialysis orders verified: yes    Access Within normal limits ( I.e. s/s of infection,...): WNL     Pre-Assessment completed: Yes, Tia Mcguire Rn    Pre-dialysis report received from: Isi Montalvo Rn                      Time: 0900    
  HEMODIALYSIS PRE-TREATMENT NOTE    Patient Identifiers prior to treatment: NAME  MRN    Isolation Required: yes                       Isolation Type: contact       (please document if patient is being managed as a PUI/COVID-19 patient)        Hepatitis status:                           Date Drawn                             Result  Hepatitis B Surface Antigen 3/8/24     neg                     Hepatitis B Surface Antibody 3/8/24 Pos     1000   Hepatitis B Core Antibody - -          How was Hepatitis Status verified: Epic     Was a copy of the labs you documented provided to facility for the patient's chart: yes    Hemodialysis orders verified: yes    Access Within normal limits ( I.e. s/s of infection,...): WNL     Pre-Assessment completed: Yes, Yesenia Pinon Rn    Pre-dialysis report received from: Edmond Hunter RN                      Time: 7295    
  HEMODIALYSIS PRE-TREATMENT NOTE    Patient Identifiers prior to treatment: Name  MRN    Isolation Required: Yes                      Isolation Type: Contact       (please document if patient is being managed as a PUI/COVID-19 patient)        Hepatitis status:                           Date Drawn                             Result  Hepatitis B Surface Antigen 2024     NEG                     Hepatitis B Surface Antibody 2024 POS     1000   Hepatitis B Core Antibody N/A N/A          How was Hepatitis Status verified: Epic Chart     Was a copy of the labs you documented provided to facility for the patient's chart: Yes    Hemodialysis orders verified: Yes by Clara Mcguire    Access Within normal limits ( I.e. s/s of infection,...): WNL     Pre-Assessment completed: yes by Clara Mcguire    Pre-dialysis report received from: Madie Schmidt                      Time: 830    
  HEMODIALYSIS PRE-TREATMENT NOTE    Patient Identifiers prior to treatment: Name, , MRN    Isolation Required: Yes                      Isolation Type: Contact for ESBL, MDRO       (please document if patient is being managed as a PUI/COVID-19 patient)        Hepatitis status:                           Date Drawn                             Result  Hepatitis B Surface Antigen 24     Neg                     Hepatitis B Surface Antibody 24 1000        Hepatitis B Core Antibody N/A N/A          How was Hepatitis Status verified: epic     Was a copy of the labs you documented provided to facility for the patient's chart: Yes    Hemodialysis orders verified: Yes    Access Within normal limits ( I.e. s/s of infection,...): No redness, or edema. Patient easily accessed with 15 gauge needles.     Pre-Assessment completed: Yes    Pre-dialysis report received from: Dana                      Time: 1001    
  Physician Progress Note      PATIENT:               SANDRA LAUREANO  CSN #:                  [id number]  :                       1960  ADMIT DATE:       2024 8:33 AM  DISCH DATE:  RESPONDING  PROVIDER #:        Madonna Painter MD          QUERY TEXT:      Pt admitted with right distal radius fracture. Per report fell from bed and   hurt right wrist.   Per 2019 DEXA: osteopenia  If possible, please document in progress notes and discharge summary if you   are evaluating and/or treating any of the following:    The medical record reflects the following:  Risk Factors: 63 y/o female, DM  Clinical Indicators:    Per report fell from bed and hurt right wrist,  2019   DEXA: Osteopenia  Treatment: non-surgical tx of fracture - spint -> plan for  brace or cast in 2   weeks    Ania Andrew, MSN, RN, CCDS, CRCR  Clinical   .  Options provided:  -- Pathological right distal radius fracture due to osteopenia following fall   which would not usually break a normal, healthy bone  -- Traumatic right distal radius  fracture  -- Other - I will add my own diagnosis  -- Disagree - Not applicable / Not valid  -- Disagree - Clinically unable to determine / Unknown  -- Refer to Clinical Documentation Reviewer    PROVIDER RESPONSE TEXT:    This patient has a pathological right distal radius fracture due to osteopenia   following fall which would not usually break a normal, healthy bone.    Query created by: Ania Andrew on 2024 2:39 PM      Electronically signed by:  Madonna Painter MD 2024 1:23 PM          
Covering for Dr. Lynn    Patient is status post right distal radius and ulnar fracture.    Patient is tolerating splint well.  She is neurovascularly intact.  Good capillary refill to her fingers.  No complaints with the splint.    Patient had dialysis yesterday    Patient is awaiting a peer to peer which most likely will occur on Monday with for discharge to [facility]  
DATE: 2024    NAME: Estefany Garcia  MRN: [id number]   : 1960    Patient not seen this date for Physical Therapy due to:      [] Cancel by RN or physician due to:    [x] Hemodialysis    [] Critical Lab Value Level     [] Blood transfusion in progress    [] Acute or unstable cardiovascular status   _MAP < 55 or more than >115  _HR < 40 or > 130    [] Acute or unstable pulmonary status   -FiO2 > 60%   _RR < 5 or >40    _O2 sats < 85%    [] Strict Bedrest    [] Off Unit for surgery or procedure    [] Off Unit for testing       [] Pending imaging to R/O fracture    [] Refusal by Patient      [] Other      [] PT being discontinued at this time. Patient independent. No further needs.     [] PT being discontinued at this time as the patient has been transferred to hospice care. No further needs.      Marion Durand, PTA      
HEMODIALYSIS POST TREATMENT NOTE    Treatment time ordered: 210     Actual treatment time: 180     UltraFiltration Goal: 2500   UltraFiltration Removed: 2300      Pre Treatment weight: 113.4  Post Treatment weight: 111.4  Estimated Dry Weight: 112.5    Access used:     Central Venous Catheter:          Tunneled or Non-tunneled: na           Site: na          Access Flow: na      Internal Access:       AV Fistula or AV Graft: AVF         Site: ROBIN       Access Flow: 400       Sign and symptoms of infection: na       If YES: na    Medications or blood products given: See MAR    Chronic outpatient schedule: [facility]    Chronic outpatient unit: [facility]    Summary of response to treatment: Pt tx completed, blood returned, dialyzer streaked, 2000 removed, needles pulled and both sites held 5 minutes    Explain if orders NOT met, was physician notified:trinh      ACES flowsheet faxed to patient unit/ placed in patient chart: yes    Post assessment completed: Yes, Tia McguireRn    Report given to: Isi Kendrick Rn      * Intra-treatment documented Safety Checks include the followin) Access and face visible at all times.     2) All connections and blood lines are secure with no kinks.     3) NVL alarm engaged.     4) Hemosafe device applied (if applicable).     5) No collapse of Arterial or Venous blood chambers.     6) All blood lines / pump segments in the air detectors.   
HEMODIALYSIS POST TREATMENT NOTE    Treatment time ordered: 3.5     Actual treatment time: 3    UltraFiltration Goal: 2500  UltraFiltration Removed: 2500      Pre Treatment weight: 114.2  Post Treatment weight: 112  Estimated Dry Weight: 112.5    Access used:     Central Venous Catheter:          Tunneled or Non-tunneled: na           Site: na          Access Flow: na      Internal Access:       AV Fistula or AV Graft: AVF         Site: ROBIN       Access Flow: 400       Sign and symptoms of infection: na       If YES: na    Medications or blood products given: na    Chronic outpatient schedule: [facility]    Chronic outpatient unit: [facility]    Summary of response to treatment: Pt tx d/c all blood returned, dialyzer streaked, 2000 removed, needles pulled and sites both held 5 minutes each     Explain if orders NOT met, was physician notified:Md Notified      ACES flowsheet faxed to patient unit/ placed in patient chart: yes    Post assessment completed: yes, Yesenia Pinon Rn    Report given to: Elsa Nguyen Rn      * Intra-treatment documented Safety Checks include the followin) Access and face visible at all times.     2) All connections and blood lines are secure with no kinks.     3) NVL alarm engaged.     4) Hemosafe device applied (if applicable).     5) No collapse of Arterial or Venous blood chambers.     6) All blood lines / pump segments in the air detectors.   
HEMODIALYSIS POST TREATMENT NOTE    Treatment time ordered: 3.5 hours    Actual treatment time: 3.5 hours    UltraFiltration Goal: 2 Kg  UltraFiltration Removed: 2 Kg      Pre Treatment weight: 112.5Kg  Post Treatment weight: 110.5  Estimated Dry Weight: 112.5    Access used:     Central Venous Catheter:          Tunneled or Non-tunneled: N/A           Site: N/a          Access Flow: N/A      Internal Access:       AV Fistula or AV Graft: Left upper arm         Site: Left upper arm       Access Flow: 450       Sign and symptoms of infection: None       If YES: N/A    Medications or blood products given: None    Chronic outpatient schedule: [facility] outpatient unit: [facility]    Summary of response to treatment: Pt tolerated treatment well. No complaints. Removed 2 Kg UF. BP stable.    Explain if orders NOT met, was physician notified:Orders met      ACES flowsheet faxed to patient unit/ placed in patient chart: Yes    Post assessment completed: Yes    Report given to: Dana Sandoval Intra-treatment documented Safety Checks include the followin) Access and face visible at all times.     2) All connections and blood lines are secure with no kinks.     3) NVL alarm engaged.     4) Hemosafe device applied (if applicable).     5) No collapse of Arterial or Venous blood chambers.     6) All blood lines / pump segments in the air detectors.   
HEMODIALYSIS POST TREATMENT NOTE    Treatment time ordered: 3.5hours    Actual treatment time: 3.5hours    UltraFiltration Goal: 2.0kgs  UltraFiltration Removed: 2.0kgs      Pre Treatment weight: 116.2kgs  Post Treatment weight: 114.2kgs  Estimated Dry Weight: 112.5kgs    Access used:     Central Venous Catheter:          Tunneled or Non-tunneled: Tunneled           Site: Right Chest          Access Flow: Good       Internal Access:       AV Fistula or AV Graft: N/A         Site: N/A       Access Flow: N/A       Sign and symptoms of infection: No       If YES: N/A    Medications or blood products given: See MAR    Chronic outpatient schedule: [facility]    Chronic outpatient unit: [facility]    Summary of response to treatment: Patient tolerated treatment good.  2.0Kgs of fluid removed without difficulty.  Needles pulled and sites clamped for 5 min per site.  Bruit and thrill are still present.    Explain if orders NOT met, was physician notified:N/A      ACES flowsheet faxed to patient unit/ placed in patient chart: Yes    Post assessment completed: Clara Mcguire    Report given to: Madie Schmidt      * Intra-treatment documented Safety Checks include the followin) Access and face visible at all times.     2) All connections and blood lines are secure with no kinks.     3) NVL alarm engaged.     4) Hemosafe device applied (if applicable).     5) No collapse of Arterial or Venous blood chambers.     6) All blood lines / pump segments in the air detectors.   
Is it ok to dispense Retacrit today?  NO    Ensure that the Hgb is NOT greater than or equal to 10.  Only dispense if the Hgb is < 10 g/dL.  Otherwise it needs to be held/discontinued per [facility] policy.  Contact the physician if this is the case.  Recent Labs     06/17/24  1107   HGB 10.2*     Vignesh Cabrera PharmD. [facility]  6/17/2024  12:49 PM    
Is it ok to dispense Retacrit today?  NO    Ensure that the Hgb is NOT greater than or equal to 10.  Only dispense if the Hgb is < 10 g/dL.  Otherwise it needs to be held/discontinued per [facility] policy.  Contact the physician if this is the case.  Recent Labs     06/10/24  1010 06/12/24  0935   HGB 10.6* 10.8*      Per [facility] ([facility]) protocol, LOLITA will be held for the next scheduled dose due to patient's hemoglobin greater than or equal to 10 g/dL, and/or seizures, uncontrolled hypertension (SBP greater than 180, DBP greater than 120), active bleeding or blood transfusion in previous two weeks or active blood transfusion order.  For exceptions, including patients who can not receive transfusions (eg. Patients awaiting transplant, Episcopal and one time doses given pre-surgery to reduce allogenic blood exposure), LOLITA will be held for hemoglobin greater than 11 g/dL.    If two LOLITA doses are held in a row, pharmacist to contact provider to discuss reduction or possible discontinuation of LOLITA.     Vignesh Cabrera, PharmD. [facility]  6/12/2024  2:06 PM   
[facility]   OCCUPATIONAL THERAPY MISSED TREATMENT NOTE   INPATIENT   Date: 24  Patient Name: Estefany Garcia       Room:   MRN: [id number]   Account #: [id number]    : 1960  (64 y.o.)  Gender: female   Referring Practitioner: Nima Lynn MD  Diagnosis: Closed fracture of R distal radius/ulna             REASON FOR MISSED TREATMENT:  Patient at testing and/or off the floor   -    Patient was out of room for dialysis. OT will continue to follow.  1315        Electronically signed by ZAYDA Wells on 24 at 4:39 PM EDT    
[facility]   Occupational Therapy Evaluation  Date: 6/10/24  Patient Name: Estefany Garcia       Room: -01  MRN: [id number]  Account: [id number]   : 1960  (64 y.o.) Gender: female     Discharge Recommendations:  Further Occupational Therapy is recommended upon facility discharge.    OT Equipment Recommendations  Other: TBD    Referring Practitioner: Nima Lynn MD  Diagnosis: Closed fracture of R distal radius/ulna   Additional Pertinent Hx: Per H&P Note:64 y.o. right hand dominant female who presented to the ED this AM (24) after she fell out of bed hurting her right wrist. She was unable to get up after the fall and was brought in by EMS. Pain is primarily localized to her right wrist, shoulder and her left arm fistula. She was diagnosed with a buckle fracture of her right wrist and is being admitted due to her inability to care for herself.    Treatment Diagnosis: Impaired self-care status    Past Medical History:  has a past medical history of Arthritis, Backache, unspecified, CAD (coronary artery disease), Cerebral artery occlusion with cerebral infarction ([facility]), CHF (congestive heart failure) ([facility]), Chronic kidney disease, Coronary atherosclerosis of artery bypass graft, COVID, Cramp of limb, Epistaxis, Gallstones, Hemodialysis patient ([facility]), Hyperlipidemia, Hypertension, Insomnia, Neuromuscular disorder ([facility]), Pneumonia, Psychiatric problem, Thyroid disease, Type II or unspecified type diabetes mellitus with renal manifestations, not stated as uncontrolled(250.40), Type II or unspecified type diabetes mellitus without mention of complication, not stated as uncontrolled, and Unspecified vitamin D deficiency.    Past Surgical History:   has a past surgical history that includes Coronary artery bypass graft (2005); Knee arthroscopy; Carpal tunnel release; Breast surgery; Tonsillectomy; Hand surgery; Ankle fracture surgery; Cholecystectomy, open (N/A); IR 
Patient in dialysis at time of rounds - not seen.     Discharge to SNF when precert obtained and bed available.     Madonna Aviles MD, FAAFP   6/12/2024 6769  
Patient known from previous admissions; patient provided medical update and talked about the circumstances surrounding her hospitalization; patient also discussed her need for rehab and the hardships of her physical limitations; listening presence and support; welcomed prayer;      06/11/24 7510   Encounter Summary   Encounter Overview/Reason Spiritual/Emotional Needs   Service Provided For Patient   Referral/Consult From Rounding   Last Encounter  06/11/24   Complexity of Encounter Moderate   Spiritual/Emotional needs   Type Spiritual Support   Assessment/Intervention/Outcome   Assessment Coping;Hopeful;Impaired resilience;Powerlessness   Intervention Active listening;Discussed illness injury and it’s impact;Explored/Affirmed feelings, thoughts, concerns;Prayer (assurance of)/Holmes;Sustaining Presence/Ministry of presence   Outcome Coping;Engaged in conversation;Expressed feelings, needs, and concerns;Expressed Gratitude;Receptive       
Patient off unit for Dialysis.  
Patient provided medical update; patient talked about her physical issues and being uncomfortable; listening presence and support; welcomed prayer     06/12/24 1535   Encounter Summary   Encounter Overview/Reason Spiritual/Emotional Needs   Service Provided For Patient   Referral/Consult From [facility]   Support System Family members   Last Encounter  06/12/24   Complexity of Encounter Moderate   Begin Time 1505   End Time  1535   Total Time Calculated 30 min   Spiritual/Emotional needs   Type Spiritual Support   Assessment/Intervention/Outcome   Assessment Coping;Hopeful;Impaired resilience;Powerlessness   Intervention Active listening;Discussed illness injury and it’s impact;Explored/Affirmed feelings, thoughts, concerns;Prayer (assurance of)/Leslie;Sustaining Presence/Ministry of presence   Outcome Comfort;Coping;Engaged in conversation;Expressed feelings, needs, and concerns;Expressed Gratitude;Receptive       
Per [facility] (P&T) protocol, LOLITA will be held for the next scheduled dose due to patient's hemoglobin greater than or equal to 10 g/dL, and/or seizures, uncontrolled hypertension (SBP greater than 180, DBP greater than 120), active bleeding or blood transfusion in previous two weeks or active blood transfusion order.  For exceptions, including patients who can not receive transfusions (eg. Patients awaiting transplant, Synagogue and one time doses given pre-surgery to reduce allogenic blood exposure), LOLITA will be held for hemoglobin greater than 11 g/dL.    If two LOLITA doses are held in a row, pharmacist to contact provider to discuss reduction or possible discontinuation of LOLITA.    Vignesh Cabrera, PharmD. [facility]  6/17/2024  12:50 PM     
Physical Therapy        Physical Therapy Cancel Note      DATE: 2024    NAME: Estefany Garcia  MRN: [id number]   : 1960      Patient not seen this date for Physical Therapy due to:    Hemodialysis:Cx; patient currently in dialysis. Writer plans to reproach if time permitting. Will continue to follow for therapy updates.       Electronically signed by Sharifa Sethi PTA on 2024 at 11:48 AM      
Physical Therapy  Facility/Department: [facility] MED SURG  Daily Treatment Note  NAME: Estefany Garcia  : 1960  MRN: [id number]    Date of Service: 2024    Discharge Recommendations:  Patient would benefit from continued therapy after discharge, Therapy recommended at discharge        Patient Diagnosis(es): The primary encounter diagnosis was Closed fracture of right wrist, initial encounter. Diagnoses of Fall, initial encounter and Closed fracture of right distal radius and ulna, initial encounter were also pertinent to this visit.    Assessment   Activity Tolerance: Patient tolerated treatment well     Plan    Physical Therapy Plan  General Plan:  (daily x 1 week)  Specific Instructions for Next Treatment: advance gait distance using right platform wheeled walker, instruct in exercise program; Co-treat w/ OT  Current Treatment Recommendations: Strengthening;Balance training;Functional mobility training;Transfer training;Endurance training;Gait training;Safety education & training;Home exercise program;Patient/Caregiver education & training;Equipment evaluation, education, & procurement;Therapeutic activities;Co-Treatment     Restrictions  Restrictions/Precautions  Restrictions/Precautions: Isolation, Fall Risk, Up Ad Kathleen  Required Braces or Orthoses?: Yes (posterior splint secured w/ ace wrap right wrist)  Implants present? : Metal implants (cardiac stent)  Upper Extremity Weight Bearing Restrictions  Right Upper Extremity Weight Bearing: Platform (WBAT with platform walker)  Position Activity Restriction  Other position/activity restrictions: Per Dr. Lynn: Remain in the splint for 2 week and then she'll be switched to a fracture brace or short arm cast for an additional 4 weeks. She can be mobilized WBAT as tolerated using a platform walker on the right side     Subjective    Subjective  Subjective: Pt is resting in bed on writers arrival, just finishing breakfast. Pt is pleasant and cooperative with 
Physical Therapy  Facility/Department: [facility] MED SURG  Daily Treatment Note  NAME: Estefany Garcia  : 1960  MRN: [id number]    Date of Service: 2024    Discharge Recommendations:  Patient would benefit from continued therapy after discharge, Therapy recommended at discharge        Patient Diagnosis(es): The primary encounter diagnosis was Closed fracture of right wrist, initial encounter. A diagnosis of Fall, initial encounter was also pertinent to this visit.      Activity Tolerance: Patient limited by pain;Patient limited by fatigue;Patient limited by endurance     Plan    Physical Therapy Plan    Current Treatment Recommendations: Strengthening;Balance training;Functional mobility training;Transfer training;Endurance training;Gait training;Safety education & training;Home exercise program;Patient/Caregiver education & training;Equipment evaluation, education, & procurement;Therapeutic activities;Co-Treatment     Restrictions  Restrictions/Precautions  Restrictions/Precautions: Isolation, Fall Risk, Up Ad Kathleen  Required Braces or Orthoses?: Yes (posterior splint secured w/ ace wrap right wrist)  Implants present? : Metal implants (cardiac stent)  Upper Extremity Weight Bearing Restrictions  Right Upper Extremity Weight Bearing: Platform (WBAT with platform walker)  Position Activity Restriction  Other position/activity restrictions: Per Dr. Lynn: Remain in the splint for 2 week and then she'll be switched to a fracture brace or short arm cast for an additional 4 weeks. She can be mobilized WBAT as tolerated using a platform walker on the right side     Subjective    Pt pleasant, sitting up in a chair  Pain: pain not rated by patient     Objective     Bed Mobility Training: No    Transfer Training  Sit to Stand: Minimum assistance (assist for placement of R UE on platform, education for L UE hand placement.)  Stand to Sit: Minimum assistance (education for hand placement)    Gait Training    Right Side 
Physical Therapy  Facility/Department: [facility] MED SURG  Daily Treatment Note  NAME: Estefany Garcia  : 1960  MRN: [id number]    Date of Service: 2024    Discharge Recommendations:  Patient would benefit from continued therapy after discharge, Therapy recommended at discharge        Patient Diagnosis(es): The primary encounter diagnosis was Closed fracture of right wrist, initial encounter. Diagnoses of Fall, initial encounter and Closed fracture of right distal radius and ulna, initial encounter were also pertinent to this visit.      Activity Tolerance: Patient tolerated treatment well     Plan    Physical Therapy Plan  Specific Instructions for Next Treatment: advance gait distance using right platform wheeled walker, instruct in exercise program; Co-treat w/ OT  Current Treatment Recommendations: Strengthening;Balance training;Functional mobility training;Transfer training;Endurance training;Gait training;Safety education & training;Home exercise program;Patient/Caregiver education & training;Equipment evaluation, education, & procurement;Therapeutic activities;Co-Treatment     Restrictions  Restrictions/Precautions  Restrictions/Precautions: Isolation, Fall Risk, Up Ad Kathleen  Required Braces or Orthoses?: Yes (posterior splint secured w/ ace wrap right wrist)  Implants present? : Metal implants (cardiac stent)  Upper Extremity Weight Bearing Restrictions  Right Upper Extremity Weight Bearing: Platform (WBAT with platform walker)  Position Activity Restriction  Other position/activity restrictions: Per Dr. Lynn: Remain in the splint for 2 week and then she'll be switched to a fracture brace or short arm cast for an additional 4 weeks. She can be mobilized WBAT as tolerated using a platform walker on the right side     Subjective    Pt sitting up in a chair upon entering room, very tearful about her situation.  Pain: pain not rated by patient     Objective     Bed Mobility Training  Rolling: Minimum 
Progress Note  Date:2024       Room:-  Patient Name:Estefany Garcia     YOB: 1960     Age:64 y.o.        Subjective    Subjective:  Symptoms:  (Some confusion noted by sister yesterday - UA ordered but patient has not urinated.).    Diet:  Adequate intake.    Activity level: Activity impairment: impaired d/t right writst fracture.    Pain:  She complains of pain that is moderate.       Review of Systems  Objective         Vitals Last 24 Hours:  TEMPERATURE:  Temp  Av.3 °F (36.8 °C)  Min: 98.1 °F (36.7 °C)  Max: 98.4 °F (36.9 °C)  RESPIRATIONS RANGE: Resp  Av  Min: 16  Max: 20  PULSE OXIMETRY RANGE: SpO2  Av.3 %  Min: 92 %  Max: 96 %  PULSE RANGE: Pulse  Av.4  Min: 63  Max: 99  BLOOD PRESSURE RANGE: Systolic (24hrs), Av , Min:108 , Max:136   ; Diastolic (24hrs), Av, Min:46, Max:64    I/O (24Hr):    Intake/Output Summary (Last 24 hours) at 2024 0217  Last data filed at 6/10/2024 1334  Gross per 24 hour   Intake 300 ml   Output 2302 ml   Net -2002 ml     Objective:  General Appearance:  Comfortable.    Vital signs: (most recent): Blood pressure (!) 120/54, pulse 66, temperature 97.9 °F (36.6 °C), resp. rate 16, height 1.651 m (5' 5\"), weight 111.4 kg (245 lb 9.5 oz), SpO2 93 %.  Vital signs are normal.      Labs/Imaging/Diagnostics    Labs:  CBC:  Recent Labs     24  1100 06/10/24  1010   WBC 7.9  --    RBC 3.37*  --    HGB 12.1 10.6*   HCT 38.5 32.1*   .2*  --    RDW 15.5*  --      --      CHEMISTRIES:  Recent Labs     24  1100 06/10/24  1010    136   K 4.6 4.3   CL 96* 94*   CO2 22 27   BUN 37* 50*   CREATININE 4.7* 5.4*   GLUCOSE 177* 347*   PHOS  --  3.9     PT/INR:No results for input(s): \"PROTIME\", \"INR\" in the last 72 hours.  APTT:No results for input(s): \"APTT\" in the last 72 hours.  LIVER PROFILE:No results for input(s): \"AST\", \"ALT\", \"BILIDIR\", \"BILITOT\", \"ALKPHOS\" in the last 72 hours.    Imaging Last 24 
Progress Note  Date:2024       Room:-  Patient Name:Estefany Garcia     YOB: 1960     Age:64 y.o.        Subjective    Subjective:  Symptoms:  (Wrist pain is not as bad today. ).    Diet:  Adequate intake.    Activity level: Activity impairment: impaired by fracture.    Pain:  She complains of pain that is moderate.       Review of Systems  Objective         Vitals Last 24 Hours:  TEMPERATURE:  Temp  Av.1 °F (36.7 °C)  Min: 97.5 °F (36.4 °C)  Max: 98.7 °F (37.1 °C)  RESPIRATIONS RANGE: Resp  Av.4  Min: 14  Max: 18  PULSE OXIMETRY RANGE: SpO2  Av.3 %  Min: 93 %  Max: 100 %  PULSE RANGE: Pulse  Av.2  Min: 56  Max: 71  BLOOD PRESSURE RANGE: Systolic (24hrs), Av , Min:105 , Max:137   ; Diastolic (24hrs), Av, Min:38, Max:59    I/O (24Hr):    Intake/Output Summary (Last 24 hours) at 2024 0232  Last data filed at 2024 1808  Gross per 24 hour   Intake 560 ml   Output 2500 ml   Net -1940 ml     Objective:  General Appearance:  Comfortable.    Vital signs: (most recent): Blood pressure (!) 137/58, pulse 70, temperature 97.4 °F (36.3 °C), temperature source Oral, resp. rate 14, height 1.651 m (5' 5\"), weight 114.2 kg (251 lb 12.3 oz), SpO2 95 %.  Vital signs are normal.      Labs/Imaging/Diagnostics    Labs:  CBC:  Recent Labs     06/10/24  1010 24  0935   HGB 10.6* 10.8*   HCT 32.1* 33.3*     CHEMISTRIES:  Recent Labs     06/10/24  1010 24  0935    134*   K 4.3 3.7   CL 94* 95*   CO2 27 26   BUN 50* 34*   CREATININE 5.4* 3.8*   GLUCOSE 347* 286*   PHOS 3.9  --      PT/INR:No results for input(s): \"PROTIME\", \"INR\" in the last 72 hours.  APTT:No results for input(s): \"APTT\" in the last 72 hours.  LIVER PROFILE:No results for input(s): \"AST\", \"ALT\", \"BILIDIR\", \"BILITOT\", \"ALKPHOS\" in the last 72 hours.    Imaging Last 24 Hours:  No results found.  Assessment//Plan           Hospital Problems             Last Modified POA    * (Principal) 
Progress Note  Date:2024       Room:-  Patient Name:Estefany Garcia     YOB: 1960     Age:64 y.o.        Subjective    Subjective:  Symptoms:  Stable.  (Patient sleeping and not awakened this am. ).    Diet:  Adequate intake.       Review of Systems  Objective         Vitals Last 24 Hours:  TEMPERATURE:  Temp  Av °F (36.7 °C)  Min: 97.9 °F (36.6 °C)  Max: 98.1 °F (36.7 °C)  RESPIRATIONS RANGE: Resp  Av  Min: 18  Max: 18  PULSE OXIMETRY RANGE: SpO2  Av %  Min: 92 %  Max: 92 %  PULSE RANGE: Pulse  Av  Min: 51  Max: 81  BLOOD PRESSURE RANGE: Systolic (24hrs), Av , Min:139 , Max:144   ; Diastolic (24hrs), Av, Min:64, Max:72    I/O (24Hr):    Intake/Output Summary (Last 24 hours) at 2024 06  Last data filed at 2024  Gross per 24 hour   Intake 10 ml   Output --   Net 10 ml       Objective:  Vital signs: (most recent): Blood pressure 139/64, pulse 51, temperature 98.1 °F (36.7 °C), resp. rate 18, height 1.651 m (5' 5\"), weight 111.9 kg (246 lb 11.1 oz), SpO2 92 %.      Labs/Imaging/Diagnostics    Labs:  CBC:  Recent Labs     24  1024   HGB 11.8*   HCT 35.1*       CHEMISTRIES:  Recent Labs     24  1024      K 4.3   CL 97*   CO2 21   BUN 44*   CREATININE 4.4*   GLUCOSE 286*       PT/INR:No results for input(s): \"PROTIME\", \"INR\" in the last 72 hours.  APTT:No results for input(s): \"APTT\" in the last 72 hours.  LIVER PROFILE:No results for input(s): \"AST\", \"ALT\", \"BILIDIR\", \"BILITOT\", \"ALKPHOS\" in the last 72 hours.    Imaging Last 24 Hours:  No results found.  Assessment//Plan           Hospital Problems             Last Modified POA    * (Principal) Closed fracture of right distal radius and ulna, initial encounter 6/10/2024 Yes    ESRD on hemodialysis ([facility]) 6/10/2024 Yes    Dialysis disequilibrium syndrome 6/10/2024 Yes    Right hemiparesis ([facility]) 6/10/2024 Yes    Congestive heart failure ([facility]) 6/10/2024 Yes    Diabetic 
Progress Note  Date:2024       Room:-01  Patient Name:Estefany Garcia     YOB: 1960     Age:64 y.o.        Subjective    Subjective:  Symptoms:  (Patient sleeping - not examined. ).       Review of Systems  Objective         Vitals Last 24 Hours:  TEMPERATURE:  Temp  Av.6 °F (36.4 °C)  Min: 97.4 °F (36.3 °C)  Max: 97.7 °F (36.5 °C)  RESPIRATIONS RANGE: Resp  Avg: 15.5  Min: 14  Max: 16  PULSE OXIMETRY RANGE: SpO2  Av %  Min: 95 %  Max: 95 %  PULSE RANGE: Pulse  Av.8  Min: 69  Max: 79  BLOOD PRESSURE RANGE: Systolic (24hrs), Av , Min:131 , Max:137   ; Diastolic (24hrs), Av, Min:57, Max:67    I/O (24Hr):  No intake or output data in the 24 hours ending 24 0518  Objective  Labs/Imaging/Diagnostics    Labs:  CBC:  Recent Labs     24  0935   HGB 10.8*   HCT 33.3*     CHEMISTRIES:  Recent Labs     24  0935   *   K 3.7   CL 95*   CO2 26   BUN 34*   CREATININE 3.8*   GLUCOSE 286*     PT/INR:No results for input(s): \"PROTIME\", \"INR\" in the last 72 hours.  APTT:No results for input(s): \"APTT\" in the last 72 hours.  LIVER PROFILE:No results for input(s): \"AST\", \"ALT\", \"BILIDIR\", \"BILITOT\", \"ALKPHOS\" in the last 72 hours.    Imaging Last 24 Hours:  No results found.  Assessment//Plan           Hospital Problems             Last Modified POA    * (Principal) Closed fracture of right distal radius and ulna, initial encounter 6/10/2024 Yes    ESRD on hemodialysis ([facility]) 6/10/2024 Yes    Dialysis disequilibrium syndrome 6/10/2024 Yes    Right hemiparesis (HCC) 6/10/2024 Yes    Congestive heart failure (HCC) 6/10/2024 Yes    Diabetic polyneuropathy associated with type 2 diabetes mellitus (HCC) 6/10/2024 Yes    Mixed hyperlipidemia 6/10/2024 Yes    Type 2 diabetes 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, with long-term current use of insulin (HCC) 6/10/2024 Yes    Essential hypertension 6/10/2024 Yes    Hx of type 2 diabetes mellitus 
Pt to dialysis.   
Writer calls report to Reena fernandez [place] at this time, all questions answered.  
Writer contacted  due to patients family complains of patient being disoriented and confused, stating this is not normal for the patient. Orders received for a clean catch urine next time patient urinates.   
20 Units SubCUTAneous TID     insulin lispro  0-8 Units SubCUTAneous TID     insulin lispro  0-4 Units SubCUTAneous Nightly    insulin glargine  72 Units SubCUTAneous QAM    insulin glargine  42 Units SubCUTAneous Nightly    magnesium oxide  400 mg Oral Daily    potassium chloride  10 mEq Oral BID    prasugrel  10 mg Oral Daily    sevelamer  1,600 mg Oral TID     sodium bicarbonate  1,300 mg Oral BID    venlafaxine  75 mg Oral Daily with breakfast     Continuous Infusions:   dextrose      sodium chloride       PRN Meds:.glucose, dextrose bolus **OR** dextrose bolus, glucagon (rDNA), dextrose, sodium chloride flush, sodium chloride, ondansetron **OR** ondansetron, polyethylene glycol, HYDROcodone-acetaminophen, acetaminophen, cyclobenzaprine, melatonin, midodrine, nitroGLYCERIN, polyvinyl alcohol    Physical Exam:    VITALS:  /60   Pulse 79   Temp 97.7 °F (36.5 °C) (Oral)   Resp 16   Ht 1.651 m (5' 5\")   Wt 112 kg (246 lb 14.6 oz)   SpO2 90%   BMI 41.09 kg/m²   TEMPERATURE:  Current - Temp: 97.7 °F (36.5 °C); Max - Temp  Av.7 °F (36.5 °C)  Min: 97.7 °F (36.5 °C)  Max: 97.7 °F (36.5 °C)  RESPIRATIONS RANGE: Resp  Av  Min: 16  Max: 16  PULSE RANGE: Pulse  Av  Min: 79  Max: 79  BLOOD PRESSURE RANGE:  Systolic (24hrs), Av , Min:102 , Max:128   ; Diastolic (24hrs), Av, Min:55, Max:60  PULSE OXIMETRY RANGE: SpO2  Av.5 %  Min: 90 %  Max: 95 %  24HR INTAKE/OUTPUT:    Intake/Output Summary (Last 24 hours) at 6/15/2024 1403  Last data filed at 2024 2230  Gross per 24 hour   Intake 240 ml   Output --   Net 240 ml       Constitutional: alert, appears stated age, and cooperative    Skin: Skin color, texture, turgor normal. No rashes or lesions    Head: Normocephalic, without obvious abnormality, atraumatic     ENT:  no epistaxis or rhinorrhea.    Neck:   Supple with no JVD.    Cardiovascular/Edema: regular rate and rhythm, S1, S2 normal, no murmur, click, rub or 
6/10/2024 Yes    Congestive heart failure (HCC) 6/10/2024 Yes    Diabetic polyneuropathy associated with type 2 diabetes mellitus (HCC) 6/10/2024 Yes    Mixed hyperlipidemia 6/10/2024 Yes    Type 2 diabetes 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, with long-term current use of insulin (HCC) 6/10/2024 Yes    Essential hypertension 6/10/2024 Yes    Hx of type 2 diabetes mellitus 6/10/2024 Yes    Assessment & Plan    Right wrist fracture - no need for surgical intervention per Ortho. Patient feels she cannot care for self at home with the injury - SNF placement short term.      ESRD on dialysis - consulted nephrology     Diabetes - glucose control at baseline, on long acting insulin, meal time coverage, SS coverage & carb control diet.      Chronic diastolic CHF - controlled     D/c when arrangements for placement made.    Time spent in pre-visit planning, interview, exam, /education and coordination of care: 37 minutes.    Electronically signed by Niesha Sunshine MD on 6/15/2024 at 8:27 AM      
SubCUTAneous TID WC    insulin lispro  0-8 Units SubCUTAneous TID WC    insulin lispro  0-4 Units SubCUTAneous Nightly    insulin glargine  72 Units SubCUTAneous QAM    insulin glargine  42 Units SubCUTAneous Nightly    magnesium oxide  400 mg Oral Daily    potassium chloride  10 mEq Oral BID    prasugrel  10 mg Oral Daily    sevelamer  1,600 mg Oral TID WC    sodium bicarbonate  1,300 mg Oral BID    venlafaxine  75 mg Oral Daily with breakfast     Continuous Infusions:   sodium chloride       PRN Meds:.sodium chloride flush, sodium chloride, ondansetron **OR** ondansetron, polyethylene glycol, HYDROcodone-acetaminophen, acetaminophen, cyclobenzaprine, melatonin, midodrine, nitroGLYCERIN, polyvinyl alcohol    Physical Exam:    VITALS:  BP (!) 115/38   Pulse 64   Temp 98.3 °F (36.8 °C)   Resp 16   Ht 1.651 m (5' 5\")   Wt 116.2 kg (256 lb 2.8 oz)   SpO2 94%   BMI 42.63 kg/m²   TEMPERATURE:  Current - Temp: 98.3 °F (36.8 °C); Max - Temp  Av.1 °F (36.7 °C)  Min: 98 °F (36.7 °C)  Max: 98.3 °F (36.8 °C)  RESPIRATIONS RANGE: Resp  Av  Min: 16  Max: 16  PULSE RANGE: Pulse  Av.9  Min: 58  Max: 71  BLOOD PRESSURE RANGE:  Systolic (24hrs), Av , Min:105 , Max:137   ; Diastolic (24hrs), Av, Min:38, Max:59  PULSE OXIMETRY RANGE: SpO2  Av %  Min: 94 %  Max: 98 %  24HR INTAKE/OUTPUT:    Intake/Output Summary (Last 24 hours) at 2024 1121  Last data filed at 2024 1232  Gross per 24 hour   Intake --   Output 400 ml   Net -400 ml       Constitutional: alert, appears stated age, and cooperative    Skin: Skin color, texture, turgor normal. No rashes or lesions    Head: Normocephalic, without obvious abnormality, atraumatic     ENT:  no epistaxis or rhinorrhea.    Neck:   Supple with no JVD.    Cardiovascular/Edema: regular rate and rhythm, S1, S2 normal, no murmur, click, rub or gallop    Respiratory: Lungs: clear to auscultation bilaterally    Abdomen: soft, non-tender; bowel sounds normal; 
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, with long-term current use of insulin (HCC) 6/10/2024 Yes    Essential hypertension 6/10/2024 Yes    Hx of type 2 diabetes mellitus 6/10/2024 Yes    Closed fracture of right wrist 6/15/2024 Yes    Fall 6/15/2024 Yes    Assessment & Plan    Right wrist fracture - no need for surgical intervention per Ortho. Patient feels she cannot care for self at home with the injury - SNF placement short term.      ESRD on dialysis - consulted nephrology     Diabetes - glucose control at baseline, on long acting insulin, meal time coverage, SS coverage & carb control diet.      Chronic diastolic CHF - controlled     D/c when arrangements for placement made.    Time spent in pre-visit planning, interview, exam, /education and coordination of care: 37 minutes.    Electronically signed by Niesha Sunshine MD on 6/16/2024 at 7:27 AM      
2-5 and elbow/shoulder ROM as tolerated- however to avoid full ROM of digits due to fracture placement. Pain/edema management using ice and elevation  Education Method: Verbal, Demonstration  Barriers to Learning: None  Education Outcome: Demonstrated understanding, Verbalized understanding, Continued education needed    Goals  Short Term Goals  Time Frame for Short Term Goals: by discharge, pt will  Short Term Goal 1: perform feeding/grooming mod I  Short Term Goal 2: perform UB ADLs with Gigi  Short Term Goal 3: perform LB ADLs with modA  Short Term Goal 4: verbalize/demonstrate and explore use of adaptive techniques/equipment/DME to increase IND during self care tasks  Short Term Goal 5: perform functional transfers/mobility with CGA using least restrictive device  Short Term Goal 6: tolerate standing for 4+ minutes during functional activity of choice, to improve strength/overall endurance during ADLs/IADLs  Short Term Goal 7: actively participate in 15+ minutes of therapeutic exercise/functional activity to promote safety and independence with self care and mobility  Occupational Therapy Plan  Times Per Week: 5-7  Current Treatment Recommendations: Strengthening, Balance training, Functional mobility training, Endurance training, Safety education & training, Pain management, Patient/Caregiver education & training, Equipment evaluation, education, & procurement, Positioning, Self-Care / ADL, Home management training, Co-Treatment    Assessment  Activity Tolerance  Activity Tolerance: Patient limited by pain, Patient Tolerated treatment well  Assessment  Performance deficits / Impairments: Decreased functional mobility , Decreased ADL status, Decreased ROM, Decreased strength, Decreased endurance, Decreased balance, Decreased high-level IADLs, Decreased fine motor control  Treatment Diagnosis: Impaired self-care status  Prognosis: Good  Decision Making: Medium Complexity  Discharge Recommendations: Patient would 
rub or gallop    Respiratory: Lungs: clear to auscultation bilaterally    Abdomen: soft, non-tender; bowel sounds normal; no masses,  no organomegaly    Back: symmetric, no curvature. ROM normal. No CVA tenderness.    Extremities: edema Bilateral ankle and right wrist in splint; left arm AV fistula with good thrill and bruit.    Neuro:  Grossly normal    CBC:   Recent Labs     06/12/24  0935   HGB 10.8*     BMP:    Recent Labs     06/12/24  0935   *   K 3.7   CL 95*   CO2 26   BUN 34*   CREATININE 3.8*   GLUCOSE 286*     Lab Results   Component Value Date/Time    NITRU NEGATIVE 06/11/2024 12:00 PM    COLORU Yellow 06/11/2024 12:00 PM    PHUR 5.0 06/11/2024 12:00 PM    PHUR 5.5 08/05/2023 03:47 PM    WBCUA TOO NUMEROUS TO COUNT 06/11/2024 12:00 PM    RBCUA 0 TO 2 06/11/2024 12:00 PM    MUCUS 1+ 02/07/2023 03:00 PM    TRICHOMONAS NOT REPORTED 11/14/2021 02:05 AM    YEAST MODERATE 08/05/2023 03:47 PM    BACTERIA MANY 06/11/2024 12:00 PM    LEUKOCYTESUR LARGE 06/11/2024 12:00 PM    UROBILINOGEN Normal 06/11/2024 12:00 PM    BILIRUBINUR NEGATIVE 06/11/2024 12:00 PM    GLUCOSEU NEGATIVE 06/11/2024 12:00 PM    KETUA TRACE 06/11/2024 12:00 PM    AMORPHOUS 1+ 02/25/2023 03:15 AM     Urine Chloride:    Lab Results   Component Value Date/Time    CLUR 26 11/14/2021 02:04 AM     Urine Protein:     Lab Results   Component Value Date/Time    TPU 20 11/12/2021 10:30 AM     IMPRESSION/RECOMMENDATIONS:      1.  End-stage renal disease - we will maintain MWF hemodialysis schedule. AV fistula has good thrill and bruit.  Renal diet,i.e 2-gram sodium,2-gram potassium,1500 ml fluid restriction,1-gram phosphorus, 1800 KCal and 1.2 gram/kg protein per day.    2.  Systemic hypertension - blood pressure control is adequate.    3.  Mineral bone disease profile - serum phosphorus level 3.9 mg/dL is within target range.    4.  Anemia of chronic kidney disease - Hemoglobin 10.6 g/dL is within target range.    5.  Leukocyturia and 
Comments: Pt reported that she has sisters that are able to assist as needed but both work  Vision/Hearing  Vision  Vision: Impaired  Vision Exceptions: Wears glasses for reading  Hearing  Hearing: Within functional limits    Cognition   Orientation  Overall Orientation Status: Within Functional Limits  Orientation Level: Oriented to place;Oriented to situation;Oriented to person;Oriented to time     Objective   O2 Device: None (Room air)       Observation/Palpation  Observation: ESBL, MDRO, VRE and MRSA; AV graft left arm, IV right shoulder; posterior splint secured w/ ace wrap right wrist  Gross Assessment  Sensation: Impaired (C/O tingling and numbness bilateral feet)     AROM RLE (degrees)  RLE AROM: WFL  AROM LLE (degrees)  LLE AROM : WFL  AROM RUE (degrees)  RUE General AROM: see OT for UE assessment; posterior splint secured w/ ace wrap right wrist; right hand dominant  AROM LUE (degrees)  LUE General AROM: see OT for UE assessment  Strength RLE  Comment: grossly 4-/5 to 4/5  Strength LLE  Comment: grossly 4-/5 to 4/5  Strength RUE  Comment: see OT for UE assessment; posterior splint secured w/ ace wrap right wrist; right hand dominant  Strength LUE  Comment: see OT for UE assessment           Bed mobility  Supine to Sit: Minimal assistance  Scooting: Minimal assistance  Bed Mobility Comments: pt dangled at the EOB w/ SBA x 1  Transfers  Sit to Stand: Minimal Assistance;2 Person Assistance  Stand to Sit: Minimal Assistance;2 Person Assistance  Stand Pivot Transfers: Minimal Assistance;2 Person Assistance (used right platdorm wheeled walker)  Ambulation  WB Status: WBAT right UE on right platdorm wheeled walker  Ambulation  Surface: Level tile  Device: Platform Walker right  Assistance: Minimal assistance;2 Person assistance  Gait Deviations: Slow Annie;Decreased step length;Decreased step height  Distance: 4 mini side steps towards the bedside chair  Comments: very fearful of falling, unsteady  More

## 2024-06-19 ENCOUNTER — HOSPITAL ENCOUNTER (OUTPATIENT)
Age: 64
Setting detail: SPECIMEN
Discharge: HOME | End: 2024-06-19

## 2024-06-19 LAB
ALBUMIN SERPL-MCNC: 3.7 G/DL (ref 3.5–5.2)
ALP SERPL-CCNC: 145 U/L (ref 35–104)
ALT SERPL-CCNC: 27 U/L (ref 5–33)
ANION GAP SERPL CALCULATED.3IONS-SCNC: 18 MMOL/L (ref 9–17)
AST SERPL-CCNC: 50 U/L
BILIRUB SERPL-MCNC: 0.7 MG/DL (ref 0.3–1.2)
BUN SERPL-MCNC: 41 MG/DL (ref 8–23)
BUN/CREAT SERPL: 9 (ref 9–20)
CALCIUM SERPL-MCNC: 9.6 MG/DL (ref 8.6–10.4)
CHLORIDE SERPL-SCNC: 100 MMOL/L (ref 98–107)
CO2 SERPL-SCNC: 23 MMOL/L (ref 20–31)
CREAT SERPL-MCNC: 4.5 MG/DL (ref 0.5–0.9)
ERYTHROCYTE [DISTWIDTH] IN BLOOD BY AUTOMATED COUNT: 15.4 % (ref 11.8–14.4)
GFR, ESTIMATED: 10 ML/MIN/1.73M2
GLUCOSE SERPL-MCNC: 190 MG/DL (ref 70–99)
HCT VFR BLD AUTO: 32.7 % (ref 36.3–47.1)
HGB BLD-MCNC: 10.3 G/DL (ref 11.9–15.1)
MCH RBC QN AUTO: 35.9 PG (ref 25.2–33.5)
MCHC RBC AUTO-ENTMCNC: 31.5 G/DL (ref 28.4–34.8)
MCV RBC AUTO: 113.9 FL (ref 82.6–102.9)
NRBC BLD-RTO: 0 PER 100 WBC
PLATELET # BLD AUTO: 155 K/UL (ref 138–453)
PMV BLD AUTO: 10.7 FL (ref 8.1–13.5)
POTASSIUM SERPL-SCNC: 3.9 MMOL/L (ref 3.7–5.3)
PROT SERPL-MCNC: 7 G/DL (ref 6.4–8.3)
RBC # BLD AUTO: 2.87 M/UL (ref 3.95–5.11)
SODIUM SERPL-SCNC: 141 MMOL/L (ref 135–144)
WBC OTHER # BLD: 5.4 K/UL (ref 3.5–11.3)

## 2024-06-19 PROCEDURE — 80053 COMPREHEN METABOLIC PANEL: CPT

## 2024-06-19 PROCEDURE — P9603 ONE-WAY ALLOW PRORATED MILES: HCPCS

## 2024-06-19 PROCEDURE — 85027 COMPLETE CBC AUTOMATED: CPT

## 2024-06-19 PROCEDURE — 36415 COLL VENOUS BLD VENIPUNCTURE: CPT

## 2024-06-19 NOTE — DISCHARGE SUMMARY
Family Medicine Discharge Summary    Estefany Garcia  :  1960  MRN:  [id number]    Admit date:  2024  Discharge date:  2024    Admitting Physician:  Nima Lynn MD    Discharge Diagnoses:    Patient Active Problem List   Diagnosis    Atherosclerosis of coronary artery bypass graft of native heart with angina pectoris ([facility])    Acute kidney injury superimposed on CKD ([facility])    Acute on chronic diastolic heart failure ([facility])    Diabetic polyneuropathy associated with type 2 diabetes mellitus ([facility])    History of coronary artery bypass graft    Iron deficiency anemia    Spinal stenosis of lumbar region with neurogenic claudication    Mixed hyperlipidemia    CKD (chronic kidney disease) stage 4, GFR 15-29 ml/min ([facility])    Type 2 diabetes 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, with long-term current use of insulin ([facility])    Obesity, Class II, BMI 35-39.9    Thyroid nodule greater than or equal to 1 cm in diameter incidentally noted on imaging study    Essential hypertension    Anemia of chronic disease    Chronic ischemic heart disease    Ischemic stroke of frontal lobe ([facility])    Morbid obesity with BMI of 40.0-44.9, adult ([facility])    Disequilibrium syndrome    Anxiety    Chronic midline low back pain with bilateral sciatica    Acute thoracic back pain    COVID    Delirium due to another medical condition    Cerebral hypoperfusion    Immature arteriovenous fistula ([facility])    History of fusion of cervical spine    Depression with anxiety    Vancomycin resistant Enterococcus UTI    ESRD on hemodialysis ([facility])    Dialysis disequilibrium syndrome    Acute cystitis without hematuria    VRE infection (vancomycin resistant Enterococcus)    Infection due to ESBL-producing Klebsiella pneumoniae    Class 2 severe obesity due to excess calories with serious comorbidity and body mass index (BMI) of 35.0 to 35.9 in adult ([facility])    Type 2 diabetes mellitus with hyperglycemia, with long-term current use of

## 2024-06-24 ENCOUNTER — OFFICE VISIT (OUTPATIENT)
Dept: VASCULAR SURGERY | Age: 64
End: 2024-06-24
Payer: COMMERCIAL

## 2024-06-24 VITALS
BODY MASS INDEX: 40.48 KG/M2 | HEART RATE: 73 BPM | DIASTOLIC BLOOD PRESSURE: 71 MMHG | RESPIRATION RATE: 16 BRPM | SYSTOLIC BLOOD PRESSURE: 112 MMHG | HEIGHT: 65 IN | WEIGHT: 243 LBS | OXYGEN SATURATION: 90 %

## 2024-06-24 DIAGNOSIS — N18.6 END STAGE RENAL DISEASE (HCC): Primary | ICD-10-CM

## 2024-06-24 DIAGNOSIS — T82.590D MECHANICAL COMPLICATION OF ARTERIOVENOUS FISTULA SURGICALLY CREATED, SUBSEQUENT ENCOUNTER: ICD-10-CM

## 2024-06-24 PROCEDURE — 1090F PRES/ABSN URINE INCON ASSESS: CPT | Performed by: SURGERY

## 2024-06-24 PROCEDURE — 1111F DSCHRG MED/CURRENT MED MERGE: CPT | Performed by: SURGERY

## 2024-06-24 PROCEDURE — G8427 DOCREV CUR MEDS BY ELIG CLIN: HCPCS | Performed by: SURGERY

## 2024-06-24 PROCEDURE — 1036F TOBACCO NON-USER: CPT | Performed by: SURGERY

## 2024-06-24 PROCEDURE — G8400 PT W/DXA NO RESULTS DOC: HCPCS | Performed by: SURGERY

## 2024-06-24 PROCEDURE — 1123F ACP DISCUSS/DSCN MKR DOCD: CPT | Performed by: SURGERY

## 2024-06-24 PROCEDURE — 3078F DIAST BP <80 MM HG: CPT | Performed by: SURGERY

## 2024-06-24 PROCEDURE — 3017F COLORECTAL CA SCREEN DOC REV: CPT | Performed by: SURGERY

## 2024-06-24 PROCEDURE — 99214 OFFICE O/P EST MOD 30 MIN: CPT | Performed by: SURGERY

## 2024-06-24 PROCEDURE — 3074F SYST BP LT 130 MM HG: CPT | Performed by: SURGERY

## 2024-06-24 PROCEDURE — G8417 CALC BMI ABV UP PARAM F/U: HCPCS | Performed by: SURGERY

## 2024-06-24 NOTE — H&P (VIEW-ONLY)
[facility] HEART AND VASCULAR  [address] [facility] [facility] [address]  Dept: [phone]     Patient: Estefany Garcia  : 1960  MRN: [id number]  DOS: 2024    Referring provider:  No ref. provider found         HPI:  Estefany Garcia is a 64 y.o. female who returns to the office for her left upper extremity fistula.  Recall that we have been working very diligently regarding this fistula.  Her last procedure on the fistula was angioplasty of the very proximal portion of the cephalic vein as it dumped into the subclavian.  I think this is problematic once again as she has a significant pulsatility in the fistula and prolonged bleeding.  Past Medical History:   Diagnosis Date    Arthritis     Backache, unspecified     CAD (coronary artery disease)     Cerebral artery occlusion with cerebral infarction (HCC)     CHF (congestive heart failure) (HCC)     Chronic kidney disease     Coronary atherosclerosis of artery bypass graft     COVID 2022    Cramp of limb     Epistaxis 2023    Gallstones     Hemodialysis patient (HCC)      AND   /   IN [place]    Hyperlipidemia     Hypertension     Insomnia     Neuromuscular disorder (HCC)     Pneumonia     Psychiatric problem     Thyroid disease     Type II or unspecified type diabetes mellitus with renal manifestations, not stated as uncontrolled(250.40)     Type II or unspecified type diabetes mellitus without mention of complication, not stated as uncontrolled     Unspecified vitamin D deficiency      Family History   Problem Relation Age of Onset    Diabetes Father     Heart Failure Father       Social History     Socioeconomic History    Marital status: Single     Spouse name: Not on file    Number of children: Not on file    Years of education: Not on file    Highest education level: Not on file   Occupational History    Not on file   Tobacco Use    Smoking  PLACEMENT GREATER THAN 5 YEARS 3/3/2023 [facility] PROC[facility]    IR TUNNELED CATHETER PLACEMENT GREATER THAN 5 YEARS Right 04/13/2023    DR. SHIN    IR TUNNELED CATHETER PLACEMENT GREATER THAN 5 YEARS  06/07/2023    IR TUNNELED CATHETER PLACEMENT GREATER THAN 5 YEARS 6/7/2023 [facility]    IR TUNNELED CATHETER PLACEMENT GREATER THAN 5 YEARS  06/08/2023    IR TUNNELED CATHETER PLACEMENT GREATER THAN 5 YEARS 6/8/2023 [facility] PROC[facility]    KNEE ARTHROSCOPY      right    OTHER SURGICAL HISTORY  04/06/2022    cvc exchange    OTHER SURGICAL HISTORY Left 04/18/2023    AVF REVISION                           DR. SHIN    PTCA  11/15/2023    4 FADIA : 2 FADIA to PDA, 1 FADIA to RPL, 1 FADIA to mid RCA.    TONSILLECTOMY      VASCULAR SURGERY Left 02/08/2024    LUE ARTERIOGRAM                  DR. SHIN    VASCULAR SURGERY Left 02/08/2024    LEFT UPPER EXTREMITY ARTERIOGRAM VIA RIGHT GROIN performed by Bib Shin MD at [facility]      Review of Systems    Vitals:    06/24/24 1517   BP: 112/71   Site: Left Upper Arm   Position: Sitting   Cuff Size: Large Adult   Pulse: 73   Resp: 16   SpO2: 90%   Weight: 110.2 kg (243 lb)   Height: 1.651 m (5' 5\")          Physical Exam  On examination there is robust pulsatility in the entirety of the fistula.  She said that she had prolonged bleeding today after dialysis.  There is a week and thrill which is early during the cardiac cycle and disappears immediately after systole.  Assessment:  1. End stage renal disease (HCC)    2. Mechanical complication of arteriovenous fistula surgically created, subsequent encounter          Plan:  At this point I would recommend going back to the operating room for fistulography and possible intervention once again.  She understands and agrees with these plans and we will be seeing her tomorrow in the operating room for fistulogram.    Electronically signed by:  Bib Shin MD

## 2024-06-24 NOTE — PROGRESS NOTES
[facility] HEART AND VASCULAR  [address] [address]  Dept: [phone]     Patient: Estefany Garcia  : 1960  MRN: [id number]  DOS: 2024    Referring provider:  No ref. provider found         HPI:  Estefany Garcia is a 64 y.o. female who returns to the office for her left upper extremity fistula.  Recall that we have been working very diligently regarding this fistula.  Her last procedure on the fistula was angioplasty of the very proximal portion of the cephalic vein as it dumped into the subclavian.  I think this is problematic once again as she has a significant pulsatility in the fistula and prolonged bleeding.  Past Medical History:   Diagnosis Date    Arthritis     Backache, unspecified     CAD (coronary artery disease)     Cerebral artery occlusion with cerebral infarction (HCC)     CHF (congestive heart failure) (HCC)     Chronic kidney disease     Coronary atherosclerosis of artery bypass graft     COVID 2022    Cramp of limb     Epistaxis 2023    Gallstones     Hemodialysis patient (HCC)      AND   /   IN [place]    Hyperlipidemia     Hypertension     Insomnia     Neuromuscular disorder (HCC)     Pneumonia     Psychiatric problem     Thyroid disease     Type II or unspecified type diabetes mellitus with renal manifestations, not stated as uncontrolled(250.40)     Type II or unspecified type diabetes mellitus without mention of complication, not stated as uncontrolled     Unspecified vitamin D deficiency      Family History   Problem Relation Age of Onset    Diabetes Father     Heart Failure Father       Social History     Socioeconomic History    Marital status: Single     Spouse name: Not on file    Number of children: Not on file    Years of education: Not on file    Highest education level: Not on file   Occupational History    Not on file   Tobacco Use    Smoking

## 2024-06-25 ENCOUNTER — OFFICE VISIT (OUTPATIENT)
Dept: ORTHOPEDIC SURGERY | Age: 64
End: 2024-06-25

## 2024-06-25 VITALS — BODY MASS INDEX: 40.48 KG/M2 | HEIGHT: 65 IN | WEIGHT: 243 LBS

## 2024-06-25 DIAGNOSIS — S62.101A CLOSED FRACTURE OF RIGHT WRIST, INITIAL ENCOUNTER: Primary | ICD-10-CM

## 2024-06-25 NOTE — PROGRESS NOTES
We will get her transition to a fast form short arm cast that she should wear for the next 4 weeks.  She should not weight-bear through the cast.  She make work on range of motion at the elbow, shoulder and at the fingers.  Patient may continue to work with physical therapy at the facility on ambulation and range of motion of the upper extremities with exception to the right wrist which will be immobilized and she should not be weightbearing through that arm at this time.  Patient is amenable to the plan and the fast form short arm cast is appropriately fitted in office today. Patient to move all fingers and states that the fast form is comfortably sitting after application.  We will see the patient back for reevaluation in 4 weeks but she was encouraged to contact the office with any question concerns she may have prior to that follow-up.    This note is created with the assistance of a speech recognition program.  While intending to generate adocument that actually reflects the content of the visit, the document can still have some errors including those of syntax and sound a like substitutions which may escape proof reading.  It such instances, actual meaningcan be extrapolated by contextual diversion.

## 2024-06-26 ENCOUNTER — HOSPITAL ENCOUNTER (OUTPATIENT)
Dept: PREADMISSION TESTING | Age: 64
Discharge: HOME | End: 2024-06-30
Attending: SURGERY

## 2024-06-26 NOTE — PROGRESS NOTES
Spoke with caregiver from [place] at [facility]. Instructed her that dialysis will need to be done on Sunday, day before procedure and that Effient does not need to be held per Dr. Marinelli.     She verbalized understanding of all pre-op instructions and will make the necessary arrangements.

## 2024-06-26 NOTE — PROGRESS NOTES
Pre-op Instructions For Out-Patient Surgery    Medication Instructions:  Please stop herbs and any supplements now (includes vitamins and minerals).    Please contact your surgeon and prescribing physician for pre-op instructions for any blood thinners. Per Dr. Marinelli may continue Effient    If you have inhalers/aerosol treatments at home, please use them the morning of your surgery and bring the inhalers with you to the hospital.    Please take the following medications the morning of your surgery with a sip of water:    Carvedilol      Surgery Instructions:  After midnight before surgery:  Do not eat or drink anything, including water, mints, gum, and hard candy.  You may brush your teeth without swallowing.  No smoking, chewing tobacco, or street drugs.    Please shower or bathe before surgery.  If you were given Surgical Scrub Chlorhexidine Gluconate Liquid (CHG), please shower the night before and the morning of your surgery following the detailed instructions you received during your pre-admission visit.     Please do not wear any cologne, lotion, powder, deodorant, jewelry, piercings, perfume, makeup, nail polish, hair accessories, or hair spray on the day of surgery.  Wear loose comfortable clothing.    Leave your valuables at home but bring a payment source for any after-surgery prescriptions you plan to fill at [facility].  Bring a storage case for any glasses/contacts.    An adult who is responsible for you MUST drive you home and should be with you for the first 24 hours after surgery.     If having out-patient knee and foot surgeries, please arrange for planned crutches, walker, or wheelchair before arriving to the hospital.    The Day of Surgery:  Arrive at [facility] Surgery Entrance at the time directed by your surgeon and check in at the desk.     If you have a living will or healthcare power of , please bring a copy.    You will be taken

## 2024-06-26 NOTE — PERIOP NOTE
Spoke with Dr. Marinelli's OR  and she was notified that procedure cannot be performed day of dialysis and need to clarify Effient.     She stated that patient is aware dialysis needs to be moved and she will double check with Dr. Marinelli in regards to Effient instructions for day of surgery and call writer back.

## 2024-06-27 ENCOUNTER — TELEPHONE (OUTPATIENT)
Dept: ORTHOPEDIC SURGERY | Age: 64
End: 2024-06-27

## 2024-06-27 NOTE — TELEPHONE ENCOUNTER
Contacted patient to inquire about appointment scheduled for 7/1/24 as patient was seen in office on 6/25/24 and patient is reporting discomfort with fast form. Patient denies any n/t at fingers, fingers are not blue/palor, nor cold to touch. Patient is able to move fingers but states she is experiencing thumb pain at site of fracture. Patient reports that she can no longer make appointment Monday as she does not have transportation. Patient invited to come into office tomorrow, 6/28/24 but states she is unable to find transportation. Patient inquiring if Taj can see her at [place] on Monday as she will be there for dialysis. Patient informed a message would be sent to Taj inquiring best plan of action. Patient voices understanding.

## 2024-06-28 NOTE — PRE-PROCEDURE INSTRUCTIONS
Spoke with Jordan Rn, at [facility], Patient scheduled for procedure with Dr. Marinelli on Monday July 1,  Jordan confirmed that transportation has been scheduled for  at 12:15 with arrival time at 1:00,  unaware on transport agency and if will be able to do return transport since most of PACU nurse say most transport agency stop transport at 5 or 6, has instuctions to hold all meds the morning of except for Coreg with a sip of water, patient  having dialysis today, advised Jordan patient to have dialysis Sunday since can't have dialysis on procedure day , advised to consult physician on amount of pm insulin to given since NPO after midnight, Patient alert and oriented , has fracture wrist, able to use walker

## 2024-06-28 NOTE — TELEPHONE ENCOUNTER
Spoke with patient regarding her conversation with another clinical staff member yesterday. Patient states that she is doing much better today and believes that she \"jumped the gun\" when she scheduled the earlier than expected appointment on 7/1/24. Patient requested the 7/1 appointment to be canceled. Appointment was canceled and patient was instructed to be evaluated at local emergency department if she experiences any numbness/tingling in her finger or is unable to move fingers. Patient voiced understanding.

## 2024-07-01 ENCOUNTER — HOSPITAL ENCOUNTER (OUTPATIENT)
Age: 64
Setting detail: OUTPATIENT SURGERY
Discharge: NURSING, REHAB OR LONG-TERM CARE | End: 2024-07-01
Attending: SURGERY | Admitting: SURGERY
Payer: COMMERCIAL

## 2024-07-01 ENCOUNTER — APPOINTMENT (OUTPATIENT)
Dept: GENERAL RADIOLOGY | Age: 64
End: 2024-07-01
Attending: SURGERY
Payer: COMMERCIAL

## 2024-07-01 VITALS
WEIGHT: 243 LBS | TEMPERATURE: 97 F | RESPIRATION RATE: 20 BRPM | DIASTOLIC BLOOD PRESSURE: 77 MMHG | HEIGHT: 65 IN | HEART RATE: 73 BPM | OXYGEN SATURATION: 100 % | BODY MASS INDEX: 40.48 KG/M2 | SYSTOLIC BLOOD PRESSURE: 146 MMHG

## 2024-07-01 LAB — GLUCOSE BLD-MCNC: 170 MG/DL (ref 65–105)

## 2024-07-01 PROCEDURE — C1894 INTRO/SHEATH, NON-LASER: HCPCS | Performed by: SURGERY

## 2024-07-01 PROCEDURE — 82947 ASSAY GLUCOSE BLOOD QUANT: CPT

## 2024-07-01 PROCEDURE — 36902 INTRO CATH DIALYSIS CIRCUIT: CPT | Performed by: SURGERY

## 2024-07-01 PROCEDURE — 2500000003 HC RX 250 WO HCPCS: Performed by: SURGERY

## 2024-07-01 PROCEDURE — 6360000002 HC RX W HCPCS: Performed by: SURGERY

## 2024-07-01 PROCEDURE — C2623 CATH, TRANSLUMIN, DRUG-COAT: HCPCS | Performed by: SURGERY

## 2024-07-01 PROCEDURE — 3600000012 HC SURGERY LEVEL 2 ADDTL 15MIN: Performed by: SURGERY

## 2024-07-01 PROCEDURE — 6360000004 HC RX CONTRAST MEDICATION: Performed by: SURGERY

## 2024-07-01 PROCEDURE — 7100000010 HC PHASE II RECOVERY - FIRST 15 MIN: Performed by: SURGERY

## 2024-07-01 PROCEDURE — 2709999900 HC NON-CHARGEABLE SUPPLY: Performed by: SURGERY

## 2024-07-01 PROCEDURE — C1769 GUIDE WIRE: HCPCS | Performed by: SURGERY

## 2024-07-01 PROCEDURE — 3600000002 HC SURGERY LEVEL 2 BASE: Performed by: SURGERY

## 2024-07-01 PROCEDURE — 7100000011 HC PHASE II RECOVERY - ADDTL 15 MIN: Performed by: SURGERY

## 2024-07-01 RX ORDER — HEPARIN SODIUM 5000 [USP'U]/ML
INJECTION, SOLUTION INTRAVENOUS; SUBCUTANEOUS PRN
Status: DISCONTINUED | OUTPATIENT
Start: 2024-07-01 | End: 2024-07-01 | Stop reason: ALTCHOICE

## 2024-07-01 RX ORDER — LIDOCAINE HYDROCHLORIDE 10 MG/ML
INJECTION, SOLUTION EPIDURAL; INFILTRATION; INTRACAUDAL; PERINEURAL PRN
Status: DISCONTINUED | OUTPATIENT
Start: 2024-07-01 | End: 2024-07-01 | Stop reason: ALTCHOICE

## 2024-07-01 RX ORDER — HYDROCODONE BITARTRATE AND ACETAMINOPHEN 5; 325 MG/1; MG/1
1 TABLET ORAL EVERY 4 HOURS PRN
Status: ON HOLD | COMMUNITY
End: 2024-07-09

## 2024-07-01 ASSESSMENT — PAIN - FUNCTIONAL ASSESSMENT
PAIN_FUNCTIONAL_ASSESSMENT: 0-10
PAIN_FUNCTIONAL_ASSESSMENT: NONE - DENIES PAIN

## 2024-07-01 NOTE — DISCHARGE INSTRUCTIONS
Okay to use fistula during next dialysis.  No need to follow-up in the office.  Dialysis can remove the suture in the fistula.  Follow-up as needed.

## 2024-07-01 NOTE — OP NOTE
Operative Note      Patient: Estefany Garcia  YOB: 1960  MRN: [id number]    Date of Procedure: 7/1/2024    Pre-Op Diagnosis Codes:     * Malfunction of arteriovenous dialysis fistula, initial encounter ([facility]) [T82.590A]    Post-Op Diagnosis: Same       Procedure(s):  FISTULAGRAM UPPER EXTREMITY LEFT    Surgeon(s):  Bib Marinelli MD    Assistant:   * No surgical staff found *    Anesthesia: Local    Estimated Blood Loss (mL): Minimal    Complications: None    Specimens:   * No specimens in log *    Implants:  * No implants in log *      Drains: * No LDAs found *    Findings:  Infection Present At Time Of Surgery (PATOS) (choose all levels that have infection present):  No infection present  Other Findings: There is a stenosis of approximately 80 to 90% about 5 cm prior to the cephalic vein dumping into the subclavian system.  This was treated well with a 6 mm drug-coated balloon for 3 minutes.  There was no residual stenosis after treatment.  She tolerated all of this quite well.  There were no complications.    Detailed Description of Procedure:   The patient was brought to the operating room and identified appropriately.  The left upper extremity was prepped and draped in a sterile fashion.  Timeout was held before lidocaine without epinephrine was infiltrated into the skin and subcutaneous tissue overlying the arterial side of the access.  The access was then entered with a micropuncture needle through which modified Seldinger technique was used to establish a 4 Singaporean micropuncture sheath.  This sheath was placed to an extension tubing through which fistulography was obtained including the arterial anastomosis, the entirety of the fistula and all of the outflow veins including the innominate vein and superior vena cava.  The 4 Singaporean sheath was removed and replaced with a 6 Singaporean sheath.  5000 units of heparin were administered through the sheath.  A Glidewire advantage was used to exchange  the sheath.  The advantage was left in place and a 6 x 60 Admiral Impact AV drug-coated balloon was used to perform angioplasty of the lesion for 3 minutes at 12 to 14 jayne.  This was then brought down and removed and completion fistulography revealed a very good result.  There was now a much better thrill through the fistula and not as much pulsatility.  The wire was removed.  The sheath was removed after a U-stitch was placed around the sheath at the level of the skin for hemostasis.  Pressure was held on that area for no less than 5 minutes following the sheath removal.  There was no bleeding or hematoma.  She tolerated all of this quite well.  There were no complications.  She can follow-up on a as needed basis as an outpatient.    Electronically signed by Bib Marinelli MD on 7/1/2024 at 4:03 PM

## 2024-07-01 NOTE — INTERVAL H&P NOTE
Update History & Physical    The patient's History and Physical of June 24,  was reviewed with the patient and I examined the patient. There was no change. The surgical site was confirmed by the patient and me.     Plan: The risks, benefits, expected outcome, and alternative to the recommended procedure have been discussed with the patient. Patient understands and wants to proceed with the procedure.     Electronically signed by Bib Marinelli MD on 7/1/2024 at 2:56 PM

## 2024-07-07 ENCOUNTER — APPOINTMENT (OUTPATIENT)
Dept: GENERAL RADIOLOGY | Age: 64
End: 2024-07-07
Payer: COMMERCIAL

## 2024-07-07 ENCOUNTER — HOSPITAL ENCOUNTER (INPATIENT)
Age: 64
LOS: 5 days | Discharge: NURSING, REHAB OR LONG-TERM CARE | End: 2024-07-12
Attending: STUDENT IN AN ORGANIZED HEALTH CARE EDUCATION/TRAINING PROGRAM | Admitting: INTERNAL MEDICINE
Payer: COMMERCIAL

## 2024-07-07 ENCOUNTER — HOSPITAL ENCOUNTER (EMERGENCY)
Age: 64
Discharge: ANOTHER HOSPITAL | End: 2024-07-07
Attending: EMERGENCY MEDICINE
Payer: COMMERCIAL

## 2024-07-07 VITALS
OXYGEN SATURATION: 97 % | RESPIRATION RATE: 26 BRPM | HEART RATE: 83 BPM | DIASTOLIC BLOOD PRESSURE: 79 MMHG | TEMPERATURE: 98.2 F | SYSTOLIC BLOOD PRESSURE: 101 MMHG

## 2024-07-07 DIAGNOSIS — I21.4 NSTEMI (NON-ST ELEVATED MYOCARDIAL INFARCTION) (HCC): Primary | ICD-10-CM

## 2024-07-07 DIAGNOSIS — S62.101A CLOSED FRACTURE OF RIGHT WRIST, INITIAL ENCOUNTER: Primary | ICD-10-CM

## 2024-07-07 DIAGNOSIS — N18.6 ESRD (END STAGE RENAL DISEASE) (HCC): ICD-10-CM

## 2024-07-07 LAB
ANION GAP SERPL CALCULATED.3IONS-SCNC: 17 MMOL/L (ref 9–17)
ANION GAP SERPL CALCULATED.3IONS-SCNC: 18 MMOL/L (ref 9–17)
ANTI-XA UNFRAC HEPARIN: 0.11 IU/L (ref 0.3–0.7)
ANTI-XA UNFRAC HEPARIN: 0.15 IU/L (ref 0.3–0.7)
BASOPHILS # BLD: 0.1 K/UL (ref 0–0.2)
BASOPHILS NFR BLD: 1 % (ref 0–2)
BUN SERPL-MCNC: 34 MG/DL (ref 8–23)
BUN SERPL-MCNC: 35 MG/DL (ref 8–23)
BUN/CREAT SERPL: 7 (ref 9–20)
CALCIUM SERPL-MCNC: 9.8 MG/DL (ref 8.6–10.4)
CALCIUM SERPL-MCNC: 9.9 MG/DL (ref 8.6–10.4)
CHLORIDE SERPL-SCNC: 98 MMOL/L (ref 98–107)
CHLORIDE SERPL-SCNC: 99 MMOL/L (ref 98–107)
CHOLEST SERPL-MCNC: 108 MG/DL (ref 0–199)
CHOLESTEROL/HDL RATIO: 2
CO2 SERPL-SCNC: 23 MMOL/L (ref 20–31)
CO2 SERPL-SCNC: 24 MMOL/L (ref 20–31)
CREAT SERPL-MCNC: 4.8 MG/DL (ref 0.5–0.9)
CREAT SERPL-MCNC: 4.9 MG/DL (ref 0.5–0.9)
EOSINOPHIL # BLD: 0.1 K/UL (ref 0–0.4)
EOSINOPHILS RELATIVE PERCENT: 2 % (ref 1–4)
ERYTHROCYTE [DISTWIDTH] IN BLOOD BY AUTOMATED COUNT: 16.6 % (ref 12.5–15.4)
EST. AVERAGE GLUCOSE BLD GHB EST-MCNC: 146 MG/DL
GFR, ESTIMATED: 9 ML/MIN/1.73M2
GFR, ESTIMATED: 9 ML/MIN/1.73M2
GLUCOSE BLD-MCNC: 256 MG/DL (ref 65–105)
GLUCOSE BLD-MCNC: 283 MG/DL (ref 65–105)
GLUCOSE SERPL-MCNC: 315 MG/DL (ref 70–99)
GLUCOSE SERPL-MCNC: 363 MG/DL (ref 70–99)
HBA1C MFR BLD: 6.7 % (ref 4–6)
HCT VFR BLD AUTO: 29.1 % (ref 36–46)
HDLC SERPL-MCNC: 44 MG/DL
HGB BLD-MCNC: 10.1 G/DL (ref 12–16)
INR PPP: 0.9
LDLC SERPL CALC-MCNC: 19 MG/DL (ref 0–100)
LYMPHOCYTES NFR BLD: 1.3 K/UL (ref 1–4.8)
LYMPHOCYTES RELATIVE PERCENT: 24 % (ref 24–44)
MAGNESIUM SERPL-MCNC: 2.3 MG/DL (ref 1.6–2.6)
MCH RBC QN AUTO: 36.9 PG (ref 26–34)
MCHC RBC AUTO-ENTMCNC: 34.7 G/DL (ref 31–37)
MCV RBC AUTO: 106.2 FL (ref 80–100)
MONOCYTES NFR BLD: 0.4 K/UL (ref 0.1–1.2)
MONOCYTES NFR BLD: 8 % (ref 2–11)
NEUTROPHILS NFR BLD: 65 % (ref 36–66)
NEUTS SEG NFR BLD: 3.6 K/UL (ref 1.8–7.7)
PARTIAL THROMBOPLASTIN TIME: 47.1 SEC (ref 21.3–31.3)
PLATELET # BLD AUTO: 154 K/UL (ref 140–450)
PMV BLD AUTO: 9.1 FL (ref 6–12)
POTASSIUM SERPL-SCNC: 4.3 MMOL/L (ref 3.7–5.3)
POTASSIUM SERPL-SCNC: 4.5 MMOL/L (ref 3.7–5.3)
PROTHROMBIN TIME: 9.9 SEC (ref 9.4–12.6)
RBC # BLD AUTO: 2.74 M/UL (ref 4–5.2)
SARS-COV-2 RDRP RESP QL NAA+PROBE: NOT DETECTED
SODIUM SERPL-SCNC: 139 MMOL/L (ref 135–144)
SODIUM SERPL-SCNC: 140 MMOL/L (ref 135–144)
SPECIMEN DESCRIPTION: NORMAL
TRIGL SERPL-MCNC: 227 MG/DL
TROPONIN I SERPL HS-MCNC: 374 NG/L (ref 0–14)
TROPONIN I SERPL HS-MCNC: 394 NG/L (ref 0–14)
TROPONIN I SERPL HS-MCNC: 421 NG/L (ref 0–14)
VLDLC SERPL CALC-MCNC: 45 MG/DL
WBC OTHER # BLD: 5.5 K/UL (ref 3.5–11)

## 2024-07-07 PROCEDURE — 6370000000 HC RX 637 (ALT 250 FOR IP): Performed by: NURSE PRACTITIONER

## 2024-07-07 PROCEDURE — 71045 X-RAY EXAM CHEST 1 VIEW: CPT

## 2024-07-07 PROCEDURE — 84484 ASSAY OF TROPONIN QUANT: CPT

## 2024-07-07 PROCEDURE — 93005 ELECTROCARDIOGRAM TRACING: CPT

## 2024-07-07 PROCEDURE — 2700000000 HC OXYGEN THERAPY PER DAY

## 2024-07-07 PROCEDURE — 99223 1ST HOSP IP/OBS HIGH 75: CPT | Performed by: NURSE PRACTITIONER

## 2024-07-07 PROCEDURE — 82947 ASSAY GLUCOSE BLOOD QUANT: CPT

## 2024-07-07 PROCEDURE — 80061 LIPID PANEL: CPT

## 2024-07-07 PROCEDURE — 85610 PROTHROMBIN TIME: CPT

## 2024-07-07 PROCEDURE — 85025 COMPLETE CBC W/AUTO DIFF WBC: CPT

## 2024-07-07 PROCEDURE — 83735 ASSAY OF MAGNESIUM: CPT

## 2024-07-07 PROCEDURE — 85520 HEPARIN ASSAY: CPT

## 2024-07-07 PROCEDURE — 94761 N-INVAS EAR/PLS OXIMETRY MLT: CPT

## 2024-07-07 PROCEDURE — 6360000002 HC RX W HCPCS: Performed by: NURSE PRACTITIONER

## 2024-07-07 PROCEDURE — 80048 BASIC METABOLIC PNL TOTAL CA: CPT

## 2024-07-07 PROCEDURE — 85730 THROMBOPLASTIN TIME PARTIAL: CPT

## 2024-07-07 PROCEDURE — 96374 THER/PROPH/DIAG INJ IV PUSH: CPT

## 2024-07-07 PROCEDURE — 99285 EMERGENCY DEPT VISIT HI MDM: CPT

## 2024-07-07 PROCEDURE — 87635 SARS-COV-2 COVID-19 AMP PRB: CPT

## 2024-07-07 PROCEDURE — 2060000000 HC ICU INTERMEDIATE R&B

## 2024-07-07 PROCEDURE — 6360000002 HC RX W HCPCS: Performed by: EMERGENCY MEDICINE

## 2024-07-07 PROCEDURE — 83036 HEMOGLOBIN GLYCOSYLATED A1C: CPT

## 2024-07-07 PROCEDURE — 36415 COLL VENOUS BLD VENIPUNCTURE: CPT

## 2024-07-07 RX ORDER — HEPARIN SODIUM 1000 [USP'U]/ML
4000 INJECTION, SOLUTION INTRAVENOUS; SUBCUTANEOUS ONCE
Status: COMPLETED | OUTPATIENT
Start: 2024-07-07 | End: 2024-07-07

## 2024-07-07 RX ORDER — VENLAFAXINE HYDROCHLORIDE 75 MG/1
75 CAPSULE, EXTENDED RELEASE ORAL
Status: DISCONTINUED | OUTPATIENT
Start: 2024-07-07 | End: 2024-07-12 | Stop reason: HOSPADM

## 2024-07-07 RX ORDER — BUSPIRONE HYDROCHLORIDE 10 MG/1
10 TABLET ORAL 3 TIMES DAILY
Status: DISCONTINUED | OUTPATIENT
Start: 2024-07-07 | End: 2024-07-12 | Stop reason: HOSPADM

## 2024-07-07 RX ORDER — INSULIN LISPRO 100 [IU]/ML
0-4 INJECTION, SOLUTION INTRAVENOUS; SUBCUTANEOUS NIGHTLY
Status: DISCONTINUED | OUTPATIENT
Start: 2024-07-07 | End: 2024-07-12 | Stop reason: HOSPADM

## 2024-07-07 RX ORDER — ACETAMINOPHEN 650 MG/1
650 SUPPOSITORY RECTAL EVERY 6 HOURS PRN
Status: DISCONTINUED | OUTPATIENT
Start: 2024-07-07 | End: 2024-07-11

## 2024-07-07 RX ORDER — HEPARIN SODIUM 10000 [USP'U]/100ML
5-30 INJECTION, SOLUTION INTRAVENOUS CONTINUOUS
Status: DISCONTINUED | OUTPATIENT
Start: 2024-07-07 | End: 2024-07-09

## 2024-07-07 RX ORDER — HEPARIN SODIUM 10000 [USP'U]/100ML
5-30 INJECTION, SOLUTION INTRAVENOUS CONTINUOUS
Status: DISCONTINUED | OUTPATIENT
Start: 2024-07-07 | End: 2024-07-07 | Stop reason: HOSPADM

## 2024-07-07 RX ORDER — INSULIN GLARGINE 100 [IU]/ML
42 INJECTION, SOLUTION SUBCUTANEOUS NIGHTLY
Status: DISCONTINUED | OUTPATIENT
Start: 2024-07-08 | End: 2024-07-12 | Stop reason: HOSPADM

## 2024-07-07 RX ORDER — ONDANSETRON 2 MG/ML
4 INJECTION INTRAMUSCULAR; INTRAVENOUS EVERY 6 HOURS PRN
Status: DISCONTINUED | OUTPATIENT
Start: 2024-07-07 | End: 2024-07-12 | Stop reason: HOSPADM

## 2024-07-07 RX ORDER — CYCLOBENZAPRINE HCL 5 MG
5 TABLET ORAL 3 TIMES DAILY PRN
Status: DISCONTINUED | OUTPATIENT
Start: 2024-07-07 | End: 2024-07-12 | Stop reason: HOSPADM

## 2024-07-07 RX ORDER — MIDODRINE HYDROCHLORIDE 5 MG/1
5 TABLET ORAL PRN
Status: DISCONTINUED | OUTPATIENT
Start: 2024-07-07 | End: 2024-07-12 | Stop reason: HOSPADM

## 2024-07-07 RX ORDER — DEXTROSE MONOHYDRATE 100 MG/ML
INJECTION, SOLUTION INTRAVENOUS CONTINUOUS PRN
Status: DISCONTINUED | OUTPATIENT
Start: 2024-07-07 | End: 2024-07-12 | Stop reason: HOSPADM

## 2024-07-07 RX ORDER — ATORVASTATIN CALCIUM 80 MG/1
80 TABLET, FILM COATED ORAL NIGHTLY
Status: DISCONTINUED | OUTPATIENT
Start: 2024-07-07 | End: 2024-07-12 | Stop reason: HOSPADM

## 2024-07-07 RX ORDER — POLYETHYLENE GLYCOL 3350 17 G/17G
17 POWDER, FOR SOLUTION ORAL DAILY PRN
Status: DISCONTINUED | OUTPATIENT
Start: 2024-07-07 | End: 2024-07-12 | Stop reason: HOSPADM

## 2024-07-07 RX ORDER — SEVELAMER CARBONATE 800 MG/1
1600 TABLET, FILM COATED ORAL
Status: DISCONTINUED | OUTPATIENT
Start: 2024-07-07 | End: 2024-07-12 | Stop reason: HOSPADM

## 2024-07-07 RX ORDER — PRASUGREL 10 MG/1
10 TABLET, FILM COATED ORAL DAILY
Status: DISCONTINUED | OUTPATIENT
Start: 2024-07-07 | End: 2024-07-12 | Stop reason: HOSPADM

## 2024-07-07 RX ORDER — LANOLIN ALCOHOL/MO/W.PET/CERES
9 CREAM (GRAM) TOPICAL NIGHTLY PRN
Status: DISCONTINUED | OUTPATIENT
Start: 2024-07-07 | End: 2024-07-12 | Stop reason: HOSPADM

## 2024-07-07 RX ORDER — ONDANSETRON 4 MG/1
4 TABLET, ORALLY DISINTEGRATING ORAL EVERY 8 HOURS PRN
Status: DISCONTINUED | OUTPATIENT
Start: 2024-07-07 | End: 2024-07-12 | Stop reason: HOSPADM

## 2024-07-07 RX ORDER — ASPIRIN 81 MG/1
81 TABLET, CHEWABLE ORAL DAILY
Status: DISCONTINUED | OUTPATIENT
Start: 2024-07-08 | End: 2024-07-12 | Stop reason: HOSPADM

## 2024-07-07 RX ORDER — FERROUS SULFATE 325(65) MG
325 TABLET, DELAYED RELEASE (ENTERIC COATED) ORAL DAILY
Status: DISCONTINUED | OUTPATIENT
Start: 2024-07-07 | End: 2024-07-12 | Stop reason: HOSPADM

## 2024-07-07 RX ORDER — HEPARIN SODIUM 1000 [USP'U]/ML
2000 INJECTION, SOLUTION INTRAVENOUS; SUBCUTANEOUS PRN
Status: DISCONTINUED | OUTPATIENT
Start: 2024-07-07 | End: 2024-07-07 | Stop reason: HOSPADM

## 2024-07-07 RX ORDER — BUMETANIDE 1 MG/1
3 TABLET ORAL 2 TIMES DAILY
Status: DISCONTINUED | OUTPATIENT
Start: 2024-07-07 | End: 2024-07-12 | Stop reason: HOSPADM

## 2024-07-07 RX ORDER — POLYVINYL ALCOHOL 14 MG/ML
1 SOLUTION/ DROPS OPHTHALMIC PRN
Status: DISCONTINUED | OUTPATIENT
Start: 2024-07-07 | End: 2024-07-09 | Stop reason: CLARIF

## 2024-07-07 RX ORDER — HEPARIN SODIUM 1000 [USP'U]/ML
4000 INJECTION, SOLUTION INTRAVENOUS; SUBCUTANEOUS PRN
Status: DISCONTINUED | OUTPATIENT
Start: 2024-07-07 | End: 2024-07-07 | Stop reason: HOSPADM

## 2024-07-07 RX ORDER — INSULIN LISPRO 100 [IU]/ML
0-8 INJECTION, SOLUTION INTRAVENOUS; SUBCUTANEOUS
Status: DISCONTINUED | OUTPATIENT
Start: 2024-07-07 | End: 2024-07-12 | Stop reason: HOSPADM

## 2024-07-07 RX ORDER — ACETAMINOPHEN 325 MG/1
650 TABLET ORAL EVERY 6 HOURS PRN
Status: DISCONTINUED | OUTPATIENT
Start: 2024-07-07 | End: 2024-07-11

## 2024-07-07 RX ORDER — SODIUM CHLORIDE 0.9 % (FLUSH) 0.9 %
5-40 SYRINGE (ML) INJECTION EVERY 12 HOURS SCHEDULED
Status: DISCONTINUED | OUTPATIENT
Start: 2024-07-07 | End: 2024-07-12 | Stop reason: HOSPADM

## 2024-07-07 RX ORDER — SODIUM CHLORIDE 9 MG/ML
INJECTION, SOLUTION INTRAVENOUS PRN
Status: DISCONTINUED | OUTPATIENT
Start: 2024-07-07 | End: 2024-07-12 | Stop reason: HOSPADM

## 2024-07-07 RX ORDER — DOCUSATE SODIUM 100 MG/1
100 CAPSULE, LIQUID FILLED ORAL 2 TIMES DAILY
Status: DISCONTINUED | OUTPATIENT
Start: 2024-07-07 | End: 2024-07-12 | Stop reason: HOSPADM

## 2024-07-07 RX ORDER — INSULIN GLARGINE 100 [IU]/ML
72 INJECTION, SOLUTION SUBCUTANEOUS EVERY MORNING
Status: DISCONTINUED | OUTPATIENT
Start: 2024-07-07 | End: 2024-07-12 | Stop reason: HOSPADM

## 2024-07-07 RX ORDER — CARVEDILOL 3.12 MG/1
3.12 TABLET ORAL 2 TIMES DAILY
Status: DISCONTINUED | OUTPATIENT
Start: 2024-07-07 | End: 2024-07-12 | Stop reason: HOSPADM

## 2024-07-07 RX ORDER — HEPARIN SODIUM 1000 [USP'U]/ML
2000 INJECTION, SOLUTION INTRAVENOUS; SUBCUTANEOUS PRN
Status: DISCONTINUED | OUTPATIENT
Start: 2024-07-07 | End: 2024-07-09

## 2024-07-07 RX ORDER — SODIUM BICARBONATE 650 MG/1
1300 TABLET ORAL 2 TIMES DAILY
Status: DISCONTINUED | OUTPATIENT
Start: 2024-07-07 | End: 2024-07-12 | Stop reason: HOSPADM

## 2024-07-07 RX ORDER — HEPARIN SODIUM 1000 [USP'U]/ML
4000 INJECTION, SOLUTION INTRAVENOUS; SUBCUTANEOUS PRN
Status: DISCONTINUED | OUTPATIENT
Start: 2024-07-07 | End: 2024-07-09

## 2024-07-07 RX ORDER — ALLOPURINOL 100 MG/1
100 TABLET ORAL DAILY
Status: DISCONTINUED | OUTPATIENT
Start: 2024-07-07 | End: 2024-07-12 | Stop reason: HOSPADM

## 2024-07-07 RX ORDER — LANOLIN ALCOHOL/MO/W.PET/CERES
400 CREAM (GRAM) TOPICAL DAILY
Status: DISCONTINUED | OUTPATIENT
Start: 2024-07-08 | End: 2024-07-12 | Stop reason: HOSPADM

## 2024-07-07 RX ORDER — SODIUM CHLORIDE 0.9 % (FLUSH) 0.9 %
10 SYRINGE (ML) INJECTION PRN
Status: DISCONTINUED | OUTPATIENT
Start: 2024-07-07 | End: 2024-07-12 | Stop reason: HOSPADM

## 2024-07-07 RX ADMIN — BUMETANIDE 3 MG: 1 TABLET ORAL at 13:15

## 2024-07-07 RX ADMIN — INSULIN GLARGINE 72 UNITS: 100 INJECTION, SOLUTION SUBCUTANEOUS at 13:14

## 2024-07-07 RX ADMIN — DOCUSATE SODIUM 100 MG: 100 CAPSULE, LIQUID FILLED ORAL at 13:15

## 2024-07-07 RX ADMIN — BUMETANIDE 3 MG: 1 TABLET ORAL at 20:40

## 2024-07-07 RX ADMIN — INSULIN LISPRO 4 UNITS: 100 INJECTION, SOLUTION INTRAVENOUS; SUBCUTANEOUS at 17:12

## 2024-07-07 RX ADMIN — SEVELAMER CARBONATE 1600 MG: 800 TABLET, FILM COATED ORAL at 17:12

## 2024-07-07 RX ADMIN — VENLAFAXINE HYDROCHLORIDE 75 MG: 75 CAPSULE, EXTENDED RELEASE ORAL at 13:15

## 2024-07-07 RX ADMIN — HEPARIN SODIUM 4000 UNITS: 1000 INJECTION INTRAVENOUS; SUBCUTANEOUS at 06:09

## 2024-07-07 RX ADMIN — SODIUM BICARBONATE 1300 MG: 650 TABLET ORAL at 13:15

## 2024-07-07 RX ADMIN — HEPARIN SODIUM 2000 UNITS: 1000 INJECTION INTRAVENOUS; SUBCUTANEOUS at 17:17

## 2024-07-07 RX ADMIN — HEPARIN SODIUM 12 UNITS/KG/HR: 10000 INJECTION, SOLUTION INTRAVENOUS at 06:13

## 2024-07-07 RX ADMIN — CYCLOBENZAPRINE HYDROCHLORIDE 5 MG: 5 TABLET, FILM COATED ORAL at 22:06

## 2024-07-07 RX ADMIN — HEPARIN SODIUM 14 UNITS/KG/HR: 10000 INJECTION, SOLUTION INTRAVENOUS at 23:49

## 2024-07-07 RX ADMIN — PRASUGREL 10 MG: 10 TABLET, FILM COATED ORAL at 13:15

## 2024-07-07 RX ADMIN — HEPARIN SODIUM 12 UNITS/KG/HR: 10000 INJECTION, SOLUTION INTRAVENOUS at 11:30

## 2024-07-07 RX ADMIN — CARVEDILOL 3.12 MG: 3.12 TABLET, FILM COATED ORAL at 20:41

## 2024-07-07 RX ADMIN — DOCUSATE SODIUM 100 MG: 100 CAPSULE, LIQUID FILLED ORAL at 20:45

## 2024-07-07 RX ADMIN — CYCLOBENZAPRINE HYDROCHLORIDE 5 MG: 5 TABLET, FILM COATED ORAL at 13:24

## 2024-07-07 RX ADMIN — SODIUM BICARBONATE 1300 MG: 650 TABLET ORAL at 20:40

## 2024-07-07 RX ADMIN — ALLOPURINOL 100 MG: 100 TABLET ORAL at 13:16

## 2024-07-07 RX ADMIN — BUSPIRONE HYDROCHLORIDE 10 MG: 10 TABLET ORAL at 20:40

## 2024-07-07 RX ADMIN — ATORVASTATIN CALCIUM 80 MG: 80 TABLET, FILM COATED ORAL at 20:40

## 2024-07-07 RX ADMIN — INSULIN LISPRO 6 UNITS: 100 INJECTION, SOLUTION INTRAVENOUS; SUBCUTANEOUS at 13:14

## 2024-07-07 RX ADMIN — SEVELAMER CARBONATE 1600 MG: 800 TABLET, FILM COATED ORAL at 13:15

## 2024-07-07 RX ADMIN — BUSPIRONE HYDROCHLORIDE 10 MG: 10 TABLET ORAL at 13:15

## 2024-07-07 ASSESSMENT — PAIN - FUNCTIONAL ASSESSMENT: PAIN_FUNCTIONAL_ASSESSMENT: 0-10

## 2024-07-07 ASSESSMENT — PAIN DESCRIPTION - ORIENTATION: ORIENTATION: MID

## 2024-07-07 ASSESSMENT — PAIN DESCRIPTION - DESCRIPTORS: DESCRIPTORS: ACHING;CRAMPING

## 2024-07-07 ASSESSMENT — PAIN DESCRIPTION - LOCATION: LOCATION: BACK

## 2024-07-07 ASSESSMENT — PAIN SCALES - GENERAL
PAINLEVEL_OUTOF10: 6
PAINLEVEL_OUTOF10: 3
PAINLEVEL_OUTOF10: 0
PAINLEVEL_OUTOF10: 0

## 2024-07-07 NOTE — CARE COORDINATION
Discharge planning    Patient admitted from [facility]. She came from [facility] as skilled.     Left VM with SS to follow up     /Patient recently at [facility] 6/9-6/17 for fracture of right distal radius and discharged to [facility] for skilled rehab.     Per assessment on 6/10 patient lives alone. Has rw, sc, wc, glucometer, hospital bed and cane. Goes to HD on MWF at [facility]. Sister drives her.     VM left with SS to follow up in am.

## 2024-07-07 NOTE — CONSULTS
Renal Consult Note    Patient :  Estefany Garcia; 64 y.o. MRN# [id number]  Location:  2042/2042-01  Attending:  Madhu Rowland DO  Admit Date:  7/7/2024   Hospital Day: 0    Reason for Consult:     Asked by Madhu Molina DO to see for AURELIA/Elevated Creatinine.    History Obtained From:     patient, electronic medical record    HD Access:     previous  Left AV fistula    HD Unit:     [facility]    Nephrologist:     Dr. Graham    Dry Weight:     113.5kg    Admission Weight:     117.2kg    History of Present Illness:     Estefany Garcia; 64 y.o. female with past medical history CAD, CHF, CHF, EF and ESRD.  She presented to hospital with the chief complaint of chest pain and shortness of breath.  Originally she was at F facility which called EMS for cardiac arrest however the patient was alert and oriented when EMS arrived.  Patient originally presented to [place] ED and was transferred to [facility] for Cath Lab and hemodialysis.  Her troponin today was 374.  Chest x-ray shows no acute cardiopulmonary process    Nephrology was consulted for dialysis management.  She receives hemodialysis at [facility] under the care of Dr. Graham using left AV fistula with a dry weight of 113.5 kg.  Her weight this morning is 117.2 kg.  She reports her breathing has improved from this morning.  She is no longer having chest pain.  Her potassium is 4.5 and bicarb is 24.    Past History/Allergies?Social History:     Past Medical History:   Diagnosis Date    Arthritis     Backache, unspecified     CAD (coronary artery disease)     Cerebral artery occlusion with cerebral infarction (HCC)     CHF (congestive heart failure) (HCC)     Chronic kidney disease     Coronary atherosclerosis of artery bypass graft     COVID 01/31/2022    Cramp of limb     Epistaxis 03/05/2023    Gallstones     Hemodialysis patient (HCC)     MONDAY WEDNESDAY AND FRIDAYS  /  [facility] IN [place]    Hyperlipidemia     Hypertension      TRICHOMONAS NOT REPORTED 11/14/2021 02:05 AM    YEAST MODERATE 08/05/2023 03:47 PM    BACTERIA MANY 06/11/2024 12:00 PM    LEUKOCYTESUR LARGE 06/11/2024 12:00 PM    UROBILINOGEN Normal 06/11/2024 12:00 PM    BILIRUBINUR NEGATIVE 06/11/2024 12:00 PM    GLUCOSEU NEGATIVE 06/11/2024 12:00 PM    KETUA TRACE 06/11/2024 12:00 PM    AMORPHOUS 1+ 02/25/2023 03:15 AM       Radiology:     CXR: ONE XRAY VIEW OF THE CHEST     7/7/2024 4:21 am     COMPARISON:  06/09/2024     HISTORY:  ORDERING SYSTEM PROVIDED HISTORY: sob  TECHNOLOGIST PROVIDED HISTORY:  sob  Reason for Exam: SOB and chest pain     FINDINGS:  Mild cardiomegaly and normal pulmonary vasculature.  The patient is status  post median sternotomy for CABG.  The lungs are clear and normally expanded.  No pneumothorax or pleural effusion.  Surrounding osseous and soft tissue  structures are noted for C5 through C7 ACDF.     IMPRESSION:  1. No acute cardiopulmonary process.  2. Mild cardiomegaly.    Assessment:     1. ESRD on Hemodialysis. Her regular HD days are MWF at [facility] hemodialysis facility using left AV fistula under Dr. Graham.  Her dry weight is 113.5kg. Admitted with 117.2kg  2. Anemia of chronic disease  3. Secondary hyperparathyroidism  4. Hypertension    Plan:   1.  Will discuss need for hemodialysis with Dr. Valverde  2. Strict Input and Output, Daily weigh and document in the chart.  3. Low Potassium, Low phosphorus and low salt diet. Fluids to be restricted to 1500ml/day.  4. IV Aranesp/Epogen for anemia of chronic disease with HD weekly.  5. IV Zemplar per protocol for secondary hyperparathyroidism with HD thrice a week.  6.  Will follow     Nutrition   Please ensure that patient is on a renal diet/TF.    Thank you for the consultation. Please do not hesitate to contact us for any further questions/concerns. We will continue to follow along with you.     Electronically signed by AFSANEH Olivares CNP on 7/7/2024 at 10:40 AM    Attending

## 2024-07-07 NOTE — ED PROVIDER NOTES
[facility] Emergency Department  [address]  [place] [address]  Phone: [phone]  Fax: [phone]      Pt Name: Estefany Garcia  MRN:[id number]  Birthdate 1960  Date of evaluation: 7/7/2024      CHIEF COMPLAINT       Chief Complaint   Patient presents with    Shortness of Breath    Chest Pain       HISTORY OF PRESENT ILLNESS   Is a 64-year-old female who woke up while at the nursing home feeling short of breath and then developed some chest pain.  EMS was called by the nursing staff stating that she was in \"cardiac arrest\".  Patient was never in cardiac arrest.  Nurse facility gave her 2 nitro's.  EMS found her awake alert and looking well.  They gave her aspirin.  Symptoms resolved on arrival.  She has a history of sleep apnea and admits that she fell asleep without her CPAP.  She does have a history of coronary disease, CHF as well as end-stage renal disease on dialysis.  Does dialysis Monday Wednesday Friday, no missed sessions.  Follows with Dr. ISAURO Katz.  patient points to 1 single spot on her chest where she felt some reproducible pain at that time which has resolved.  She feels fine at this time but very anxious.    REVIEW OF SYSTEMS     Review of Systems   Respiratory:  Positive for shortness of breath.    Cardiovascular:  Positive for chest pain.   Gastrointestinal: Negative.    All other systems reviewed and are negative.        PAST MEDICAL HISTORY    has a past medical history of Arthritis, Backache, unspecified, CAD (coronary artery disease), Cerebral artery occlusion with cerebral infarction ([facility]), CHF (congestive heart failure) ([facility]), Chronic kidney disease, Coronary atherosclerosis of artery bypass graft, COVID, Cramp of limb, Epistaxis, Gallstones, Hemodialysis patient ([facility]), Hyperlipidemia, Hypertension, Insomnia, Neuromuscular disorder ([facility]), Pneumonia, Psychiatric problem, Thyroid disease, Type II or unspecified type diabetes mellitus with renal

## 2024-07-07 NOTE — PROGRESS NOTES
End Of Shift Note  [facility]  Summary of shift: Patient arrived from  ED at 0930. No c/o chest pain since in the field. Trops cont to trend up. Now 421. Possible heart cath tomorrow. Extensive cardiac history including 6 stents over the last 5 years with the most recent being placed in 2/24. Plan pending cardiac recommendation.     Vitals:    Vitals:    07/07/24 0930 07/07/24 1000 07/07/24 1200 07/07/24 1600   BP: (!) 99/54  132/61 (!) 140/53   Pulse: 81  81 92   Resp: 25      Temp: 97.3 °F (36.3 °C)  97.1 °F (36.2 °C) 97.1 °F (36.2 °C)   TempSrc: Temporal  Temporal Temporal   SpO2: 92%  92% 99%   Weight:  117.2 kg (258 lb 4.8 oz)     Height:  1.651 m (5' 5\")          I&O: No intake or output data in the 24 hours ending 07/07/24 1841    Resp Status: 2L NC, cpap at night    Ventilator Settings:     / / /     Critical Care IV infusions:   sodium chloride      heparin (PORCINE) Infusion 14 Units/kg/hr (07/07/24 1716)    dextrose          LDA:   Peripheral IV 07/07/24 Right Antecubital (Active)   Number of days: 0       External Urinary Catheter (Active)   Number of days: 0       Hemodialysis Fistula/Graft Left Forearm (Active)   Number of days:        Hemodialysis Fistula/Graft Left Arm (Active)   Number of days: 761       Hemodialysis Fistula/Graft Arteriovenous fistula Left Forearm (Active)   Number of days:        Wound 06/09/24 Buttocks Left;Right (Active)   Number of days: 28       Wound 07/07/24 Radial Right (Active)   Number of days: 0

## 2024-07-07 NOTE — PROGRESS NOTES
Trop continuing to trend up. Now 421. Orders for trop and EKG in AM. Possible heart cath paddy pending cardiology recommendation and plan. Pt is denying any chest pain.

## 2024-07-07 NOTE — H&P
[facility]  Office: [phone]  Laith Shirley DO, Eric Nicholson DO, Madhu Rowland DO, Sergio Gill DO, Dinh Graham MD, Mirela Knowles MD, Wilmer Francois MD, Nancy Bullock MD,  Shaw Correa MD, Heladio Sanchez MD, Genaro Hoyt MD,  Gini Humphrey DO, Frnaklin Anthony MD, Ang Jorgensen MD, Van Shirley DO, Richa Godinez MD,  Rakesh Curtis DO, Quiana Soares MD, Liset Reyez MD, Irlanda Knight MD, Anders Call MD,  Anirudh Dias MD, Charissa Driver MD, John Bell MD, Donnie Mancilla MD, Yinka Caputo MD, Ehsan Patel MD, Marcelino De La Vega DO, Benny Estrada DO, Jenny Hawley MD,  Bao Torres MD, Shirley Waterhouse, CNP,  Christine Boss CNP, Adrián Smyth, CNP,  Estefany Stallings, ARIEL, Viji Velazquez, CNP, Carla Rae, CNP, Oneyda Marsh CNP, Danielle Gamble, CNP, Katherin Brewer, PA-C, Sarita Prakash PA-C, Renuka Alba, CNP, Regine Snow, CNP, Mesha Singh, CNP, Megan Carpio, CNP, Libby Massey, CNP, Pat Lombardo, CNS, Karla Gutiérrez, CNP, Ami Laguerre CNP, Tracy Schwab, CNP         [facility]   IN-PATIENT SERVICE   [facility]    HISTORY AND PHYSICAL EXAMINATION            Date:   7/7/2024  Patient name:  Estefany Garcia  Date of admission:  7/7/2024  9:25 AM  MRN:   [id number]  Account:  [id number]  YOB: 1960  PCP:    Zulma Payne APRN - CNP  Room:   2042/2042-01  Code Status:    Full Code    Chief Complaint:     Chest pain and shortness of breath     History Obtained From:     patient    History of Present Illness:     Estefany Garcia is a 64 y.o.  female with a history of type 2 diabetes, ESRD on HD, CAD s/p CABG and PCI(6 stents), CHF, ERICK who initially presented to [place] ED from nursing facility after waking up with shortness of breath and chest pain and is admitted to the hospital for the management of NSTEMI (non-ST elevated myocardial infarction) (HCC).  Nursing staff gave her 2 sublingual

## 2024-07-07 NOTE — PROGRESS NOTES
Patient transported to room via [facility] team on stretcher. Patient placed in bed and put on cardiac monitor. Heparin drip infusing. Vital signs obtained. Pt denies chest pain and SOB. Patient has been oriented to room, educated on how to use call light, and to call for assistance prior to getting up. Pt updated on plan of care and verbalized understanding. Patient is currently resting in bed comfortably. No distress noted. Bed in lowest and locked position. 2 siderails up for safety. Call light within reach.

## 2024-07-07 NOTE — PROGRESS NOTES
4 Eyes Skin Assessment     NAME:  Estefany Garcia  YOB: 1960  MEDICAL RECORD NUMBER:  [id number]    The patient is being assessed for  Admission    I agree that at least one RN has performed a thorough Head to Toe Skin Assessment on the patient. ALL assessment sites listed below have been assessed.      Areas assessed by both nurses:    Head, Face, Ears, Shoulders, Back, Chest, Arms, Elbows, Hands, Sacrum. Buttock, Coccyx, Ischium, and Legs. Feet and Heels        Does the Patient have a Wound? Stage 1 on coccyx, surgical repair ERNESTINE Kiser Prevention initiated by RN: No  Wound Care Orders initiated by RN: No    Pressure Injury (Stage 3,4, Unstageable, DTI, NWPT, and Complex wounds) if present, place Wound referral order by RN under : No    New Ostomies, if present place, Ostomy referral order under : No     Nurse 1 eSignature: Electronically signed by Debra Elaine RN on 7/7/24 at 1:45 PM EDT    **SHARE this note so that the co-signing nurse can place an eSignature**    Nurse 2 eSignature: Electronically signed by Oneyda Harden RN on 7/7/24 at 3:15 PM EDT

## 2024-07-08 LAB
ANION GAP SERPL CALCULATED.3IONS-SCNC: 14 MMOL/L (ref 9–17)
ANTI-XA UNFRAC HEPARIN: 0.38 IU/L (ref 0.3–0.7)
ANTI-XA UNFRAC HEPARIN: 0.44 IU/L (ref 0.3–0.7)
ANTI-XA UNFRAC HEPARIN: 0.46 IU/L (ref 0.3–0.7)
BUN SERPL-MCNC: 39 MG/DL (ref 8–23)
BUN/CREAT SERPL: 7 (ref 9–20)
CALCIUM SERPL-MCNC: 9.4 MG/DL (ref 8.6–10.4)
CHLORIDE SERPL-SCNC: 102 MMOL/L (ref 98–107)
CO2 SERPL-SCNC: 26 MMOL/L (ref 20–31)
CREAT SERPL-MCNC: 5.3 MG/DL (ref 0.5–0.9)
ERYTHROCYTE [DISTWIDTH] IN BLOOD BY AUTOMATED COUNT: 15.6 % (ref 11.8–14.4)
GFR, ESTIMATED: 8 ML/MIN/1.73M2
GLUCOSE BLD-MCNC: 181 MG/DL (ref 65–105)
GLUCOSE BLD-MCNC: 184 MG/DL (ref 65–105)
GLUCOSE BLD-MCNC: 247 MG/DL (ref 65–105)
GLUCOSE BLD-MCNC: 279 MG/DL (ref 65–105)
GLUCOSE SERPL-MCNC: 171 MG/DL (ref 70–99)
HCT VFR BLD AUTO: 29.7 % (ref 36.3–47.1)
HGB BLD-MCNC: 9.9 G/DL (ref 11.9–15.1)
MAGNESIUM SERPL-MCNC: 2.2 MG/DL (ref 1.6–2.6)
MCH RBC QN AUTO: 36.3 PG (ref 25.2–33.5)
MCHC RBC AUTO-ENTMCNC: 33.3 G/DL (ref 28.4–34.8)
MCV RBC AUTO: 108.8 FL (ref 82.6–102.9)
NRBC BLD-RTO: 0.5 PER 100 WBC
PLATELET # BLD AUTO: 163 K/UL (ref 138–453)
PMV BLD AUTO: 10.8 FL (ref 8.1–13.5)
POTASSIUM SERPL-SCNC: 4.5 MMOL/L (ref 3.7–5.3)
RBC # BLD AUTO: 2.73 M/UL (ref 3.95–5.11)
SODIUM SERPL-SCNC: 142 MMOL/L (ref 135–144)
TROPONIN I SERPL HS-MCNC: 471 NG/L (ref 0–14)
TROPONIN I SERPL HS-MCNC: 485 NG/L (ref 0–14)
WBC OTHER # BLD: 6 K/UL (ref 3.5–11.3)

## 2024-07-08 PROCEDURE — 85027 COMPLETE CBC AUTOMATED: CPT

## 2024-07-08 PROCEDURE — 6370000000 HC RX 637 (ALT 250 FOR IP): Performed by: NURSE PRACTITIONER

## 2024-07-08 PROCEDURE — 82947 ASSAY GLUCOSE BLOOD QUANT: CPT

## 2024-07-08 PROCEDURE — 6360000002 HC RX W HCPCS: Performed by: NURSE PRACTITIONER

## 2024-07-08 PROCEDURE — 80048 BASIC METABOLIC PNL TOTAL CA: CPT

## 2024-07-08 PROCEDURE — 2060000000 HC ICU INTERMEDIATE R&B

## 2024-07-08 PROCEDURE — 94761 N-INVAS EAR/PLS OXIMETRY MLT: CPT

## 2024-07-08 PROCEDURE — 36415 COLL VENOUS BLD VENIPUNCTURE: CPT

## 2024-07-08 PROCEDURE — 90935 HEMODIALYSIS ONE EVALUATION: CPT

## 2024-07-08 PROCEDURE — 85520 HEPARIN ASSAY: CPT

## 2024-07-08 PROCEDURE — 83735 ASSAY OF MAGNESIUM: CPT

## 2024-07-08 PROCEDURE — 84484 ASSAY OF TROPONIN QUANT: CPT

## 2024-07-08 PROCEDURE — 2700000000 HC OXYGEN THERAPY PER DAY

## 2024-07-08 PROCEDURE — 5A1D70Z PERFORMANCE OF URINARY FILTRATION, INTERMITTENT, LESS THAN 6 HOURS PER DAY: ICD-10-PCS | Performed by: INTERNAL MEDICINE

## 2024-07-08 PROCEDURE — 99232 SBSQ HOSP IP/OBS MODERATE 35: CPT | Performed by: INTERNAL MEDICINE

## 2024-07-08 RX ADMIN — BUSPIRONE HYDROCHLORIDE 10 MG: 10 TABLET ORAL at 14:44

## 2024-07-08 RX ADMIN — HEPARIN SODIUM 14 UNITS/KG/HR: 10000 INJECTION, SOLUTION INTRAVENOUS at 17:25

## 2024-07-08 RX ADMIN — ASPIRIN 81 MG CHEWABLE TABLET 81 MG: 81 TABLET CHEWABLE at 10:42

## 2024-07-08 RX ADMIN — DOCUSATE SODIUM 100 MG: 100 CAPSULE, LIQUID FILLED ORAL at 20:28

## 2024-07-08 RX ADMIN — BUMETANIDE 3 MG: 1 TABLET ORAL at 14:17

## 2024-07-08 RX ADMIN — PRASUGREL 10 MG: 10 TABLET, FILM COATED ORAL at 10:42

## 2024-07-08 RX ADMIN — SEVELAMER CARBONATE 1600 MG: 800 TABLET, FILM COATED ORAL at 14:11

## 2024-07-08 RX ADMIN — EPOETIN ALFA-EPBX 3000 UNITS: 3000 INJECTION, SOLUTION INTRAVENOUS; SUBCUTANEOUS at 17:18

## 2024-07-08 RX ADMIN — SODIUM BICARBONATE 1300 MG: 650 TABLET ORAL at 10:42

## 2024-07-08 RX ADMIN — MIDODRINE HYDROCHLORIDE 5 MG: 5 TABLET ORAL at 12:27

## 2024-07-08 RX ADMIN — SODIUM BICARBONATE 1300 MG: 650 TABLET ORAL at 20:28

## 2024-07-08 RX ADMIN — Medication 400 MG: at 20:28

## 2024-07-08 RX ADMIN — DOCUSATE SODIUM 100 MG: 100 CAPSULE, LIQUID FILLED ORAL at 10:42

## 2024-07-08 RX ADMIN — SEVELAMER CARBONATE 1600 MG: 800 TABLET, FILM COATED ORAL at 17:55

## 2024-07-08 RX ADMIN — CYCLOBENZAPRINE HYDROCHLORIDE 5 MG: 5 TABLET, FILM COATED ORAL at 10:42

## 2024-07-08 RX ADMIN — Medication 9 MG: at 20:29

## 2024-07-08 RX ADMIN — CYCLOBENZAPRINE HYDROCHLORIDE 5 MG: 5 TABLET, FILM COATED ORAL at 17:28

## 2024-07-08 RX ADMIN — BUSPIRONE HYDROCHLORIDE 10 MG: 10 TABLET ORAL at 20:28

## 2024-07-08 RX ADMIN — BUSPIRONE HYDROCHLORIDE 10 MG: 10 TABLET ORAL at 10:42

## 2024-07-08 RX ADMIN — INSULIN GLARGINE 72 UNITS: 100 INJECTION, SOLUTION SUBCUTANEOUS at 10:42

## 2024-07-08 RX ADMIN — ACETAMINOPHEN 650 MG: 325 TABLET ORAL at 20:28

## 2024-07-08 RX ADMIN — ALLOPURINOL 100 MG: 100 TABLET ORAL at 10:42

## 2024-07-08 RX ADMIN — ATORVASTATIN CALCIUM 80 MG: 80 TABLET, FILM COATED ORAL at 20:28

## 2024-07-08 RX ADMIN — CARVEDILOL 3.12 MG: 3.12 TABLET, FILM COATED ORAL at 14:17

## 2024-07-08 RX ADMIN — MIDODRINE HYDROCHLORIDE 5 MG: 5 TABLET ORAL at 10:06

## 2024-07-08 RX ADMIN — VENLAFAXINE HYDROCHLORIDE 75 MG: 75 CAPSULE, EXTENDED RELEASE ORAL at 10:45

## 2024-07-08 RX ADMIN — INSULIN GLARGINE 42 UNITS: 100 INJECTION, SOLUTION SUBCUTANEOUS at 20:28

## 2024-07-08 RX ADMIN — INSULIN LISPRO 4 UNITS: 100 INJECTION, SOLUTION INTRAVENOUS; SUBCUTANEOUS at 17:17

## 2024-07-08 ASSESSMENT — PAIN DESCRIPTION - LOCATION
LOCATION: HAND;WRIST
LOCATION: HAND;WRIST

## 2024-07-08 ASSESSMENT — PAIN DESCRIPTION - ORIENTATION
ORIENTATION: RIGHT
ORIENTATION: RIGHT

## 2024-07-08 ASSESSMENT — PAIN SCALES - GENERAL
PAINLEVEL_OUTOF10: 0
PAINLEVEL_OUTOF10: 6
PAINLEVEL_OUTOF10: 0
PAINLEVEL_OUTOF10: 6

## 2024-07-08 ASSESSMENT — PAIN DESCRIPTION - DESCRIPTORS
DESCRIPTORS: ACHING;SORE
DESCRIPTORS: ACHING;SORE

## 2024-07-08 NOTE — CARE COORDINATION
Social Work-Recived message from Reena fernandez Pbcarlos. Pt will need new precert to return. Deja

## 2024-07-08 NOTE — PROGRESS NOTES
End Of Shift Note  [facility]  Summary of shift: Pt had an uneventful night, no episodes of chest pain. Pt has been having cramping, flexeril does help with the cramps.  Pt has rested throughout the night, vitals stable. Pt plan is to possibly get a heart cath.     Vitals:    Vitals:    07/08/24 0000 07/08/24 0333 07/08/24 0400 07/08/24 0442   BP: (!) 141/44  (!) 101/54    Pulse: 80  74 79   Resp: 16  17    Temp: 98.3 °F (36.8 °C)  97 °F (36.1 °C)    TempSrc: Temporal  Temporal    SpO2: 97%  98% 98%   Weight:  118.1 kg (260 lb 4.8 oz)     Height:            I&O:   Intake/Output Summary (Last 24 hours) at 7/8/2024 0606  Last data filed at 7/8/2024 0315  Gross per 24 hour   Intake --   Output 1100 ml   Net -1100 ml       Resp Status: 2L NC    Ventilator Settings:     / / /     Critical Care IV infusions:   sodium chloride      heparin (PORCINE) Infusion 14 Units/kg/hr (07/07/24 0589)    dextrose          LDA:   Peripheral IV 07/07/24 Right Antecubital (Active)   Number of days: 0       External Urinary Catheter (Active)   Number of days: 0       Hemodialysis Fistula/Graft Left Forearm (Active)   Number of days:        Hemodialysis Fistula/Graft Left Arm (Active)   Number of days: 762       Hemodialysis Fistula/Graft Arteriovenous fistula Left Forearm (Active)   Number of days:        Wound 06/09/24 Buttocks Left;Right (Active)   Number of days: 28       Wound 07/07/24 Radial Right (Active)   Number of days: 0

## 2024-07-08 NOTE — PROGRESS NOTES
HEMODIALYSIS POST TREATMENT NOTE    Treatment time ordered: 210    Actual treatment time: 210    UltraFiltration Goal: 2300  UltraFiltration Removed: 2300      Pre Treatment weight: 115.5  Post Treatment weight: 113.5  Estimated Dry Weight: 115.5    Access used:     Central Venous Catheter:          Tunneled or Non-tunneled: na           Site: na          Access Flow: na      Internal Access:       AV Fistula or AV Graft: AVF         Site: ROBIN       Access Flow: 400        Sign and symptoms of infection: na       If YES: na    Medications or blood products given: see MAR    Chronic outpatient schedule: [facility]    Chronic outpatient unit: [facility]    Summary of response to treatment: Tx d/c all blood returned, dialyzer streaked, 2000 removed, needles pulled, sites held 5 minutes each     Explain if orders NOT met, was physician notified:trinh      ACES flowsheet faxed to patient unit/ placed in patient chart: na    Post assessment completed: Clara Mcguire Rn    Report given to: Reji Hoyt Rn      * Intra-treatment documented Safety Checks include the followin) Access and face visible at all times.     2) All connections and blood lines are secure with no kinks.     3) NVL alarm engaged.     4) Hemosafe device applied (if applicable).     5) No collapse of Arterial or Venous blood chambers.     6) All blood lines / pump segments in the air detectors.

## 2024-07-08 NOTE — PROGRESS NOTES
Subjective: patient evaluated on dialysis this afternoon.  No Access problems reported . No new issues overnite. Stable hemodynamics.   She denies any orthopnea paroxysmal dyspnea or any chest pain at this time.  Overnight no acute issues.  Blood pressures were stable although in the high 90s.  We were tentatively setting her up to remove 1.8 to 2.0 L off on dialysis.  Cardiology note reviewed.  Apparently no procedure planned per their note    Objective:  CURRENT TEMPERATURE:  Temp: 97.4 °F (36.3 °C)  MAXIMUM TEMPERATURE OVER 24HRS:  Temp (24hrs), Av.5 °F (36.4 °C), Min:97 °F (36.1 °C), Max:98.3 °F (36.8 °C)    CURRENT RESPIRATORY RATE:  Respirations: 17  CURRENT PULSE:  Pulse: 86  CURRENT BLOOD PRESSURE:  BP: 96/62  24HR BLOOD PRESSURE RANGE:  Systolic (24hrs), Av , Min:96 , Max:141   ; Diastolic (24hrs), Av, Min:34, Max:62    24HR INTAKE/OUTPUT:    Intake/Output Summary (Last 24 hours) at 2024 1253  Last data filed at 2024 0315  Gross per 24 hour   Intake --   Output 1100 ml   Net -1100 ml     Patient Vitals for the past 96 hrs (Last 3 readings):   Weight   24 1000 115.5 kg (254 lb 10.1 oz)   24 0333 118.1 kg (260 lb 4.8 oz)   24 1000 117.2 kg (258 lb 4.8 oz)         Physical Exam:  General appearance:Awake, alert, in no acute distress  Skin: warm and dry, no rash or erythema  Eyes: conjunctivae normal and sclera anicteric  ENT:no thrush no pharyngeal congestion   Neck:  No JVD, No Thyromegaly  Pulmonary: clear to auscultation and no wheezing or rhonchi   Cardiovascular: normal S1 and S2. NO rubs and NO murmur. No S3 OR S4   Abdomen: soft nontender, bowel sounds present, no organomegaly,  no ascites   Extremities: no cyanosis, clubbing or edema     Access:  previous  Left AV fistula    Current Medications:    sodium chloride flush 0.9 % injection 5-40 mL, 2 times per day  sodium chloride flush 0.9 % injection 10 mL, PRN  0.9 % sodium chloride infusion, PRN  ondansetron

## 2024-07-08 NOTE — CARE COORDINATION
Case Management Assessment  Initial Evaluation    Date/Time of Evaluation: 7/8/2024 2:56 PM  Assessment Completed by: MAUDE GONZALEZ RN    If patient is discharged prior to next notation, then this note serves as note for discharge by case management.    Patient Name: Estefany Garcia                   YOB: 1960  Diagnosis: NSTEMI (non-ST elevated myocardial infarction) (HCC) [I21.4]                   Date / Time: 7/7/2024  9:25 AM    Patient Admission Status: Inpatient   Readmission Risk (Low < 19, Mod (19-27), High > 27): Readmission Risk Score: 36.5    Current PCP: Zulma Payne APRN - CNP  PCP verified by CM? Yes    Chart Reviewed: Yes      History Provided by: Patient  Patient Orientation: Alert and Oriented, Person, Place, Situation, Self    Patient Cognition: Alert    Hospitalization in the last 30 days (Readmission):  Yes    If yes, Readmission Assessment in CM Navigator will be completed.    Advance Directives:      Code Status: Full Code   Patient's Primary Decision Maker is: Legal Next of Kin    Primary Decision Maker: Hailey Guido - Brother/Sister - [phone]    Secondary Decision Maker: Nancy Perdomo - Brother/Sister - [phone]    Discharge Planning:    Patient lives with: Family Members Type of Home: Skilled Nursing Facility  Primary Care Giver: Other (Comment) (From SNF.)  Patient Support Systems include: Family Members   Current Financial resources: None  Current community resources: None  Current services prior to admission: Durable Medical Equipment            Current DME: Wheelchair, Walker, Shower Chair, Cane            Type of Home Care services:  None    ADLS  Prior functional level: Assistance with the following:, Bathing, Toileting, Shopping, Housework, Cooking, Mobility, Dressing  Current functional level: Bathing, Toileting, Dressing, Cooking, Housework, Shopping, Mobility    PT AM-PAC:   /24  OT AM-PAC:   /24    Family can provide assistance at DC: No  Would

## 2024-07-08 NOTE — PROGRESS NOTES
[facility]  Office: [phone]  Laith Shirley, DO, Eric Nicholson, DO, Madhu Rowland DO, Sergio Gill, DO, Dinh Graham MD, Mirela Knowles MD, Wilmer Francois MD, Nancy Bullock MD,  Shaw Correa MD, Heladio Sanchez MD, Genaro Hoyt MD,  Gini Humphrey DO, rFanklin Anthony MD, Ang Jorgensen MD, Van Shirley DO, Richa Godinez MD,  Rakesh Curtis DO, Quiana Soares MD, Liset Reyez MD, Irlanda Knight MD, Anders Call MD,  Anirudh Dias MD, Charissa Driver MD, John Bell MD, Donnie Mancilla MD, Yinka Caputo MD, Ehsan Patel MD, Marcelino De La Vega DO, Benny Estrada DO, Jenny Hawley MD,  Bao Torres MD, Shirley Waterhouse, CNP,  Christine Boss CNP, Adrián Smyth, CNP,  Estefany Stallings, DNP, Viji Velazquez, CNP, Carla Rae, CNP, Oneyda Marsh CNP, Danielle Gamble, CNP, Katherin Brewer, PA-C, Sarita Prakash PA-C, Renuka Alba, CNP, Regine Snow, CNP, Mesha Singh, CNP, Megan Carpio, CNP, Libby Massey, CNP, Pat Lombardo, CNS, Karla Gutiérrez, CNP, Ami Laguerre CNP, Tracy Schwab, CNP         [facility]   IN-PATIENT SERVICE   [facility]    Progress Note    7/8/2024    7:59 AM    Name:   Estefany Garcia  MRN:     [id number]     Acct:      [id number]   Room:   2042/2042-01   Day:  1  Admit Date:  7/7/2024  9:25 AM    PCP:   Zulma Payne APRN - CNP  Code Status:  Full Code    Subjective:     C/C: Chest pain, shortness of breath    Interval History Status: improved.     Patient is resting in bed, denies any further chest pain.  Denies any nausea vomiting, fevers or chills, cough or sputum production.  Intermittent shortness of breath but comfortable at rest    Brief History:     This is a 64-year-old female with a history of end-stage renal disease, coronary disease and congestive heart failure that presents to [facility] as a transfer from [facility] with a complaint of chest pain and shortness of breath and findings of  place and time and normal affect  Lungs:  clear to auscultation bilaterally, normal effort  Heart:  regular rate and rhythm, no murmur  Abdomen:  soft, nontender, nondistended, normal bowel sounds, no masses, hepatomegaly, splenomegaly  Extremities:  no edema, redness, tenderness in the calves  Skin:  no gross lesions, rashes, induration    Assessment:        Hospital Problems             Last Modified POA    * (Principal) NSTEMI (non-ST elevated myocardial infarction) ([facility]) 7/7/2024 Yes    Chronic respiratory failure with hypoxia ([facility]) 7/7/2024 Yes    Mixed hyperlipidemia 7/7/2024 Yes    Type 2 diabetes 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, with long-term current use of insulin ([facility]) 7/7/2024 Yes    Essential hypertension 7/7/2024 Yes    Anemia of chronic disease 7/7/2024 Yes    Chronic ischemic heart disease 7/7/2024 Yes    Morbid obesity with BMI of 40.0-44.9, adult ([facility]) 7/7/2024 Yes    Anxiety 7/7/2024 Yes    ESRD (end stage renal disease) on dialysis ([facility]) 7/7/2024 Yes    ERICK (obstructive sleep apnea) 7/7/2024 Yes       Plan:        Trend troponin  Dialysis per nephrology  Insulin scale for glycemic control  Monitor and control blood pressure  Continue home CPAP  PT and OT  GI and DVT prophylaxis  Discharge planning 24 hours if troponin elevation remains flat and respiration was improved with dialysis treatments    Madhu Rowland DO  7/8/2024  7:59 AM

## 2024-07-08 NOTE — PROGRESS NOTES
HEMODIALYSIS PRE-TREATMENT NOTE    Patient Identifiers prior to treatment: Name  MRN    Isolation Required: yes                      Isolation Type: contact       (please document if patient is being managed as a PUI/COVID-19 patient)        Hepatitis status:                           Date Drawn                             Result  Hepatitis B Surface Antigen 3/8/24     neg                     Hepatitis B Surface Antibody 3/8/24 pos     100   Hepatitis B Core Antibody            How was Hepatitis Status verified: Db Kohli     Was a copy of the labs you documented provided to facility for the patient's chart: yes    Hemodialysis orders verified: yes    Access Within normal limits ( I.e. s/s of infection,...): WNL     Pre-Assessment completed: yes, Clara Mcguire Rn    Pre-dialysis report received from: Reji Hoyt Rn                      Time: 945

## 2024-07-08 NOTE — PROGRESS NOTES
SPIRITUAL CARE DEPARTMENT [facility]  PROGRESS NOTE    Room # 2042/2042-01   Name: Estefany Garcia            Buddhist: Shinto     Reason for visit:  Social Isolation    I visited the patient.    Admit Date & Time: 7/7/2024  9:25 AM    Assessment:  Estefany Garcia is a 64 y.o. female in the hospital because Elevated Myocardial Infarction and Broken Hand/Forearm . Upon entering the room Pt. Was Alert and Conversant.  Pt. Spoke with Writer about grand-nephews.      Intervention:  I introduced myself and my title as  I offered space for Pt.  to express feelings, needs, and concerns and provided a ministry presence. Writer gave Pt. Prayer Card and Prayed with Pt.    Outcome:  Pt. Expressed thanks for Visit.    Plan:  Chaplains will remain available to offer spiritual and emotional support as needed.    Electronically signed by Chaplain Ermelinda, on 7/8/2024 at 7:31 PM.  Spiritual Care Department  [facility]       07/08/24 1928   Encounter Summary   Encounter Overview/Reason Spiritual/Emotional Needs;Loneliness/Social Isolation   Service Provided For Patient   Referral/Consult From Other (comment)  (Social Isolation)   Support System Family members   Last Encounter  07/08/24   Complexity of Encounter Low   Begin Time 1915   End Time  1925   Total Time Calculated 10 min   Spiritual/Emotional needs   Type Spiritual Support   Assessment/Intervention/Outcome   Assessment Calm;Coping;Hopeful;Peaceful   Intervention Active listening;Prayer (assurance of)/[place];Sustaining Presence/Ministry of presence   Outcome Engaged in conversation;Expressed Gratitude   Plan and Referrals   Plan/Referrals No future visits requested

## 2024-07-08 NOTE — CONSULTS
[facility]  Inpatient Cardiology Consult             Date:   7/7/24  Patient name: Estefany Garcia  Date of admission:  7/7/2024  9:25 AM  MRN:   [id number]  YOB: 1960      Reason for consultation:  Chest pain; elevated troponin    CHIEF COMPLAINT:  Atypical chest pain     History Obtained From:  Patient and medical record    HISTORY OF PRESENT ILLNESS:      The patient is a 64 y.o woman  with a history of type 2 diabetes, ESRD on HD, CAD s/p CABG and PCI(6 stents), CHF, ERICK who initially presented to [place] ED from nursing facility after waking up with shortness of breath and chest pain and is admitted to the hospital for the management of NSTEMI (non-ST elevated myocardial infarction) ([facility]).  Nursing staff gave her 2 sublingual nitros and symptoms had resolved by the time she had arrived to the ED.  Patient reports her family had signed her out for a \"day pass\" the day before for a holiday gathering where she spent most of the day in the heat.  She also reports she fell asleep without her CPAP last night.     She has had no recent angina or SOB. Serial troponins are stable at 374 and 394 ng/L and EKG shows sinus rhythm with no acute changes.  Pt had cardiac catheterization in Feb 2024 showing patent bypass grafts with in-stent restenosis in the mid RCA and RPDA successfully treated with angioplasty.      Past Medical History:   has a past medical history of Arthritis, Backache, unspecified, CAD (coronary artery disease), Cerebral artery occlusion with cerebral infarction ([facility]), CHF (congestive heart failure) ([facility]), Chronic kidney disease, Coronary atherosclerosis of artery bypass graft, COVID, Cramp of limb, Epistaxis, Gallstones, Hemodialysis patient ([facility]), Hyperlipidemia, Hypertension, Insomnia, Neuromuscular disorder ([facility]), Pneumonia, Psychiatric problem, Thyroid disease, Type II or unspecified type diabetes mellitus with renal manifestations, not stated as

## 2024-07-08 NOTE — CARE COORDINATION
Social Work-Patient was admitted from [facility]. Her plan is to return there at ID. Faxed referral to [facility]. Deja

## 2024-07-09 LAB
ANION GAP SERPL CALCULATED.3IONS-SCNC: 13 MMOL/L (ref 9–17)
ANTI-XA UNFRAC HEPARIN: 0.39 IU/L (ref 0.3–0.7)
BUN SERPL-MCNC: 25 MG/DL (ref 8–23)
BUN/CREAT SERPL: 7 (ref 9–20)
CALCIUM SERPL-MCNC: 9.3 MG/DL (ref 8.6–10.4)
CHLORIDE SERPL-SCNC: 102 MMOL/L (ref 98–107)
CO2 SERPL-SCNC: 22 MMOL/L (ref 20–31)
CREAT SERPL-MCNC: 3.7 MG/DL (ref 0.5–0.9)
ERYTHROCYTE [DISTWIDTH] IN BLOOD BY AUTOMATED COUNT: 16.3 % (ref 11.8–14.4)
GFR, ESTIMATED: 13 ML/MIN/1.73M2
GLUCOSE BLD-MCNC: 211 MG/DL (ref 65–105)
GLUCOSE BLD-MCNC: 235 MG/DL (ref 65–105)
GLUCOSE BLD-MCNC: 311 MG/DL (ref 65–105)
GLUCOSE SERPL-MCNC: 242 MG/DL (ref 70–99)
HCT VFR BLD AUTO: 29.7 % (ref 36.3–47.1)
HGB BLD-MCNC: 9.8 G/DL (ref 11.9–15.1)
MAGNESIUM SERPL-MCNC: 2.2 MG/DL (ref 1.6–2.6)
MCH RBC QN AUTO: 36.8 PG (ref 25.2–33.5)
MCHC RBC AUTO-ENTMCNC: 33 G/DL (ref 28.4–34.8)
MCV RBC AUTO: 111.7 FL (ref 82.6–102.9)
NRBC BLD-RTO: 0.7 PER 100 WBC
PLATELET # BLD AUTO: 139 K/UL (ref 138–453)
PMV BLD AUTO: 10.9 FL (ref 8.1–13.5)
POTASSIUM SERPL-SCNC: 4.4 MMOL/L (ref 3.7–5.3)
RBC # BLD AUTO: 2.66 M/UL (ref 3.95–5.11)
SODIUM SERPL-SCNC: 137 MMOL/L (ref 135–144)
TROPONIN I SERPL HS-MCNC: 436 NG/L (ref 0–14)
WBC OTHER # BLD: 5.9 K/UL (ref 3.5–11.3)

## 2024-07-09 PROCEDURE — 2700000000 HC OXYGEN THERAPY PER DAY

## 2024-07-09 PROCEDURE — 97162 PT EVAL MOD COMPLEX 30 MIN: CPT

## 2024-07-09 PROCEDURE — 6370000000 HC RX 637 (ALT 250 FOR IP): Performed by: NURSE PRACTITIONER

## 2024-07-09 PROCEDURE — 83735 ASSAY OF MAGNESIUM: CPT

## 2024-07-09 PROCEDURE — 2060000000 HC ICU INTERMEDIATE R&B

## 2024-07-09 PROCEDURE — 36415 COLL VENOUS BLD VENIPUNCTURE: CPT

## 2024-07-09 PROCEDURE — 99232 SBSQ HOSP IP/OBS MODERATE 35: CPT | Performed by: INTERNAL MEDICINE

## 2024-07-09 PROCEDURE — 80048 BASIC METABOLIC PNL TOTAL CA: CPT

## 2024-07-09 PROCEDURE — 6370000000 HC RX 637 (ALT 250 FOR IP): Performed by: INTERNAL MEDICINE

## 2024-07-09 PROCEDURE — 84484 ASSAY OF TROPONIN QUANT: CPT

## 2024-07-09 PROCEDURE — 85520 HEPARIN ASSAY: CPT

## 2024-07-09 PROCEDURE — 97530 THERAPEUTIC ACTIVITIES: CPT

## 2024-07-09 PROCEDURE — 94761 N-INVAS EAR/PLS OXIMETRY MLT: CPT

## 2024-07-09 PROCEDURE — 99233 SBSQ HOSP IP/OBS HIGH 50: CPT

## 2024-07-09 PROCEDURE — 85027 COMPLETE CBC AUTOMATED: CPT

## 2024-07-09 PROCEDURE — 82947 ASSAY GLUCOSE BLOOD QUANT: CPT

## 2024-07-09 PROCEDURE — 2580000003 HC RX 258: Performed by: NURSE PRACTITIONER

## 2024-07-09 RX ORDER — CARVEDILOL 3.12 MG/1
3.12 TABLET ORAL 2 TIMES DAILY
Qty: 60 TABLET | Refills: 3 | DISCHARGE
Start: 2024-07-09

## 2024-07-09 RX ORDER — CALCIUM CARBONATE 500 MG/1
1000 TABLET, CHEWABLE ORAL 3 TIMES DAILY PRN
Status: DISCONTINUED | OUTPATIENT
Start: 2024-07-09 | End: 2024-07-12 | Stop reason: HOSPADM

## 2024-07-09 RX ORDER — HYDROCODONE BITARTRATE AND ACETAMINOPHEN 5; 325 MG/1; MG/1
1 TABLET ORAL EVERY 4 HOURS PRN
Qty: 10 TABLET | Refills: 0 | Status: SHIPPED | OUTPATIENT
Start: 2024-07-09 | End: 2024-07-16

## 2024-07-09 RX ADMIN — SODIUM BICARBONATE 1300 MG: 650 TABLET ORAL at 09:55

## 2024-07-09 RX ADMIN — BUSPIRONE HYDROCHLORIDE 10 MG: 10 TABLET ORAL at 09:54

## 2024-07-09 RX ADMIN — VENLAFAXINE HYDROCHLORIDE 75 MG: 75 CAPSULE, EXTENDED RELEASE ORAL at 09:53

## 2024-07-09 RX ADMIN — ACETAMINOPHEN 650 MG: 325 TABLET ORAL at 21:50

## 2024-07-09 RX ADMIN — PRASUGREL 10 MG: 10 TABLET, FILM COATED ORAL at 09:55

## 2024-07-09 RX ADMIN — SEVELAMER CARBONATE 1600 MG: 800 TABLET, FILM COATED ORAL at 18:45

## 2024-07-09 RX ADMIN — ASPIRIN 81 MG CHEWABLE TABLET 81 MG: 81 TABLET CHEWABLE at 09:55

## 2024-07-09 RX ADMIN — ALLOPURINOL 100 MG: 100 TABLET ORAL at 09:55

## 2024-07-09 RX ADMIN — POLYETHYLENE GLYCOL 400 AND PROPYLENE GLYCOL 1 DROP: 4; 3 SOLUTION/ DROPS OPHTHALMIC at 22:05

## 2024-07-09 RX ADMIN — Medication 400 MG: at 22:04

## 2024-07-09 RX ADMIN — Medication 9 MG: at 21:51

## 2024-07-09 RX ADMIN — CYCLOBENZAPRINE HYDROCHLORIDE 5 MG: 5 TABLET, FILM COATED ORAL at 15:58

## 2024-07-09 RX ADMIN — INSULIN GLARGINE 72 UNITS: 100 INJECTION, SOLUTION SUBCUTANEOUS at 09:52

## 2024-07-09 RX ADMIN — CYCLOBENZAPRINE HYDROCHLORIDE 5 MG: 5 TABLET, FILM COATED ORAL at 21:52

## 2024-07-09 RX ADMIN — CYCLOBENZAPRINE HYDROCHLORIDE 5 MG: 5 TABLET, FILM COATED ORAL at 05:58

## 2024-07-09 RX ADMIN — INSULIN LISPRO 6 UNITS: 100 INJECTION, SOLUTION INTRAVENOUS; SUBCUTANEOUS at 18:45

## 2024-07-09 RX ADMIN — SEVELAMER CARBONATE 1600 MG: 800 TABLET, FILM COATED ORAL at 12:48

## 2024-07-09 RX ADMIN — DOCUSATE SODIUM 100 MG: 100 CAPSULE, LIQUID FILLED ORAL at 21:52

## 2024-07-09 RX ADMIN — Medication 1000 MG: at 22:03

## 2024-07-09 RX ADMIN — CARVEDILOL 3.12 MG: 3.12 TABLET, FILM COATED ORAL at 09:53

## 2024-07-09 RX ADMIN — BUSPIRONE HYDROCHLORIDE 10 MG: 10 TABLET ORAL at 21:44

## 2024-07-09 RX ADMIN — SODIUM CHLORIDE, PRESERVATIVE FREE 10 ML: 5 INJECTION INTRAVENOUS at 10:02

## 2024-07-09 RX ADMIN — BUSPIRONE HYDROCHLORIDE 10 MG: 10 TABLET ORAL at 12:48

## 2024-07-09 RX ADMIN — ATORVASTATIN CALCIUM 80 MG: 80 TABLET, FILM COATED ORAL at 21:44

## 2024-07-09 RX ADMIN — SEVELAMER CARBONATE 1600 MG: 800 TABLET, FILM COATED ORAL at 09:53

## 2024-07-09 RX ADMIN — INSULIN LISPRO 4 UNITS: 100 INJECTION, SOLUTION INTRAVENOUS; SUBCUTANEOUS at 21:44

## 2024-07-09 RX ADMIN — INSULIN GLARGINE 42 UNITS: 100 INJECTION, SOLUTION SUBCUTANEOUS at 22:03

## 2024-07-09 RX ADMIN — CARVEDILOL 3.12 MG: 3.12 TABLET, FILM COATED ORAL at 21:51

## 2024-07-09 RX ADMIN — DOCUSATE SODIUM 100 MG: 100 CAPSULE, LIQUID FILLED ORAL at 09:55

## 2024-07-09 RX ADMIN — BUMETANIDE 3 MG: 1 TABLET ORAL at 09:55

## 2024-07-09 RX ADMIN — SODIUM BICARBONATE 1300 MG: 650 TABLET ORAL at 21:52

## 2024-07-09 RX ADMIN — BUMETANIDE 3 MG: 1 TABLET ORAL at 21:45

## 2024-07-09 RX ADMIN — INSULIN LISPRO 2 UNITS: 100 INJECTION, SOLUTION INTRAVENOUS; SUBCUTANEOUS at 09:52

## 2024-07-09 RX ADMIN — POLYETHYLENE GLYCOL 3350 17 G: 17 POWDER, FOR SOLUTION ORAL at 21:44

## 2024-07-09 RX ADMIN — SODIUM CHLORIDE, PRESERVATIVE FREE 10 ML: 5 INJECTION INTRAVENOUS at 21:52

## 2024-07-09 ASSESSMENT — PAIN DESCRIPTION - ONSET: ONSET: ON-GOING

## 2024-07-09 ASSESSMENT — PAIN DESCRIPTION - ORIENTATION
ORIENTATION: MID
ORIENTATION: RIGHT

## 2024-07-09 ASSESSMENT — PAIN SCALES - GENERAL
PAINLEVEL_OUTOF10: 8
PAINLEVEL_OUTOF10: 3
PAINLEVEL_OUTOF10: 0
PAINLEVEL_OUTOF10: 6
PAINLEVEL_OUTOF10: 8

## 2024-07-09 ASSESSMENT — PAIN DESCRIPTION - LOCATION
LOCATION: ARM;WRIST
LOCATION: BACK
LOCATION: HAND
LOCATION: ARM;WRIST

## 2024-07-09 ASSESSMENT — PAIN DESCRIPTION - DESCRIPTORS
DESCRIPTORS: ACHING
DESCRIPTORS: SPASM
DESCRIPTORS: ACHING;SORE;THROBBING
DESCRIPTORS: ACHING

## 2024-07-09 ASSESSMENT — PAIN - FUNCTIONAL ASSESSMENT: PAIN_FUNCTIONAL_ASSESSMENT: PREVENTS OR INTERFERES SOME ACTIVE ACTIVITIES AND ADLS

## 2024-07-09 ASSESSMENT — PAIN DESCRIPTION - FREQUENCY: FREQUENCY: CONTINUOUS

## 2024-07-09 ASSESSMENT — PAIN DESCRIPTION - PAIN TYPE: TYPE: ACUTE PAIN

## 2024-07-09 NOTE — PROGRESS NOTES
End Of Shift Note  [facility]  Summary of shift: Pt had an uneventful night, heparin at 14 units. Rested well throughout the night. Blood pressure low, held coreg and Bumex. Trending trops.   Vitals:    Vitals:    07/09/24 0000 07/09/24 0004 07/09/24 0400 07/09/24 0412   BP: (!) 100/56 (!) 100/56 (!) 97/57 (!) 97/57   Pulse: 77 77 71 73   Resp: 18  18    Temp: 96.8 °F (36 °C)      TempSrc: Temporal  Temporal    SpO2: 96% 95% 95% 96%   Weight:   116.9 kg (257 lb 11.5 oz)    Height:            I&O:   Intake/Output Summary (Last 24 hours) at 7/9/2024 0619  Last data filed at 7/8/2024 1338  Gross per 24 hour   Intake 300 ml   Output 2300 ml   Net -2000 ml       Resp Status: RA    Ventilator Settings:     / / /     Critical Care IV infusions:   sodium chloride      heparin (PORCINE) Infusion 14 Units/kg/hr (07/08/24 1725)    dextrose          LDA:   Peripheral IV 07/07/24 Right Antecubital (Active)   Number of days: 1       External Urinary Catheter (Active)   Number of days: 1       Hemodialysis Fistula/Graft Left Forearm (Active)   Number of days:        Hemodialysis Fistula/Graft Left Arm (Active)   Number of days: 763       Hemodialysis Fistula/Graft Arteriovenous fistula Left Forearm (Active)   Number of days:        Wound 06/09/24 Buttocks Left;Right (Active)   Number of days: 29       Wound 07/07/24 Radial Right (Active)   Number of days: 1          Monitor BP  Labs

## 2024-07-09 NOTE — PROGRESS NOTES
NEPHROLOGY PROGRESS NOTE      ASSESSMENT     1 ESRD:dialysis [facility].  Dialysis orders were reviewed with nurse at bedside.  2.admitted with chest pain, likely atypical, cardiology following.  No interventions from their standpoint reading through their note.  3 history of essential hypertension, blood pressures are okay:  4 history of coronary artery disease, had a cardiac cath in February of this year with couple stents.  WEEMS to LAD was patent and SVG to diagonal was patent as well.  5.  Diabetes mellitus type 2    PLAN     Continue hemodialysis as scheduled  Okay to discharge from nephrology standpoint      SUBJECTIVE   Underwent hemodialysis yesterday.  Uneventful.  Denies chest pain shortness of breath no acute hemodynamic issues overnight.  Blood pressure stable.  Plans for discharge today noted.    OBJECTIVE     Vitals:    07/09/24 0749 07/09/24 0800 07/09/24 1204 07/09/24 1300   BP: (!) 104/41 (!) 104/43 (!) 95/55    Pulse: 75 76 79    Resp: 18  18    Temp: 97.1 °F (36.2 °C)  97 °F (36.1 °C)    TempSrc: Temporal  Temporal    SpO2: 97%  96% 91%   Weight:       Height:         24HR INTAKE/OUTPUT:  No intake or output data in the 24 hours ending 07/09/24 1408    General appearance:Awake, alert, in no acute distress  HEENT: PERRLA  Respiratory::vesicular breath sounds,no wheeze/crackles  Cardiovascular:S1 S2 normal,no gallop or organic murmur.  Abdomen:Non tender/non distended.Bowel sounds present  Extremities: No Cyanosis or Clubbing,Lower extremity edema  Neurological:Alert and oriented.No abnormalities of mood, affect, memory, mentation, or behavior are noted      MEDICATIONS     Scheduled Meds:    sodium chloride flush  5-40 mL IntraVENous 2 times per day    atorvastatin  80 mg Oral Nightly    aspirin  81 mg Oral Daily    carvedilol  3.125 mg Oral BID    bumetanide  3 mg Oral BID    epoetin raphael-epbx  3,000 Units SubCUTAneous Once per day on Mon Wed Fri    insulin glargine  72 Units  Reported on 7/1/2024)  FEROSUL 325 (65 Fe) MG tablet, Take 1 tablet by mouth daily  sodium bicarbonate 650 MG tablet, Take 2 tablets by mouth twice daily  bumetanide (BUMEX) 1 MG tablet, Take 3 tablets by mouth 2 times daily  allopurinol (ZYLOPRIM) 100 MG tablet, Take 1 tablet by mouth daily  docusate sodium (COLACE) 100 MG capsule, Take 1 capsule by mouth 2 times daily  insulin aspart (NOVOLOG FLEXPEN) 100 UNIT/ML injection pen, Inject 20 Units into the skin 3 times daily (before meals) Inject 20 units into the skin 3 times daily, before meals.  Additionally sliding scale coverage as needed 3 times a day: for blood sugar  no additional insulin.  Sugar 140-199 give 3 units.  Sugar 200-249 give 6 units.  Sugar 250-299 give 9 units.  Sugar 300-3 49 give 12 units.  Sugar 3  give 15 units.  Sugar over 400 give 18 units.  Maximum allowable amount 103 units daily  sevelamer (RENVELA) 800 MG tablet, Take 2 tablets by mouth 3 times daily (with meals)  Magnesium 400 MG CAPS, Take 1 capsule by mouth daily  [DISCONTINUED] Continuous Blood Gluc Sensor (DEXCOM G6 SENSOR) MISC, USE TO CHECK BLOOD SUGAR FOUR TIMES DAILY . CHANGE EVERY 10 DAYS  venlafaxine (EFFEXOR XR) 75 MG extended release capsule, TAKE 1 CAPSULE BY MOUTH ONCE DAILY WITH BREAKFAST  prasugrel (EFFIENT) 10 MG TABS, Take 1 tablet by mouth daily  polyvinyl alcohol (LIQUIFILM TEARS) 1.4 % ophthalmic solution, Place 1 drop into both eyes as needed for Dry Eyes  Insulin Pen Needle (EASY TOUCH PEN NEEDLES) 29G X 12MM MISC, 5 each by Does not apply route daily  nitroGLYCERIN (NITROSTAT) 0.4 MG SL tablet, Place 1 tablet under the tongue every 5 minutes as needed for Chest pain up to max of 3 total doses. If no relief after 1 dose, call 911.  Continuous Blood Gluc  (DEXCOM G6 ) KODY, 1 each by Does not apply route 4 times daily  aspirin 81 MG chewable tablet, Take 1 tablet by mouth daily  acetaminophen (TYLENOL) 325 MG tablet, Take 2 tablets by  mouth every 6 hours as needed for Pain  busPIRone (BUSPAR) 10 MG tablet, Take 1 tablet by mouth 3 times daily  [DISCONTINUED] carvedilol (COREG) 3.125 MG tablet, Take 1 tablet by mouth 2 times daily Hold for SBP < 120  midodrine (PROAMATINE) 5 MG tablet, Take 1 tablet by mouth as needed (up to 2 tablets as needed for SBP <100 during dialysis)  Melatonin 10 MG TABS, Take 10 mg by mouth nightly as needed (for sleep) Indications: Trouble Sleeping  Calcium Polycarbophil (FIBER-CAPS PO), Take 2 capsules by mouth in the morning and at bedtime  Lancets MISC, 1 each by Does not apply route daily    INVESTIGATIONS     Last 3 CMP:    Recent Labs     07/07/24  0951 07/08/24  0607 07/09/24  0608    142 137   K 4.3 4.5 4.4   CL 98 102 102   CO2 23 26 22   BUN 35* 39* 25*   CREATININE 4.9* 5.3* 3.7*   CALCIUM 9.9 9.4 9.3       Last 3 CBC:  Recent Labs     07/07/24  0447 07/08/24  0658 07/09/24  0608   WBC 5.5 6.0 5.9   RBC 2.74* 2.73* 2.66*   HGB 10.1* 9.9* 9.8*   HCT 29.1* 29.7* 29.7*   .2* 108.8* 111.7*   MCH 36.9* 36.3* 36.8*   MCHC 34.7 33.3 33.0   RDW 16.6* 15.6* 16.3*    163 139   MPV 9.1 10.8 10.9       Please do not hesitate to call with questions    This note is created with the assistance of a speech-recognition program. While intending to generate a document that actually reflects the content of the visit, no guarantees can be provided that every mistake has been identified and corrected by editing    Jono Valverde MD MD, MRCP ([place]), FACP   7/9/2024 2:08 PM  [facility]

## 2024-07-09 NOTE — PROGRESS NOTES
HGB 10.1* 9.9* 9.8*   HCT 29.1* 29.7* 29.7*   .2* 108.8* 111.7*   MCH 36.9* 36.3* 36.8*   MCHC 34.7 33.3 33.0   RDW 16.6* 15.6* 16.3*    163 139   MPV 9.1 10.8 10.9   INR 0.9  --   --      Chemistry:  Recent Labs     07/07/24  0951 07/07/24  1642 07/08/24  0607 07/08/24  0658 07/09/24  0608     --  142  --  137   K 4.3  --  4.5  --  4.4   CL 98  --  102  --  102   CO2 23  --  26  --  22   GLUCOSE 315*  --  171*  --  242*   BUN 35*  --  39*  --  25*   CREATININE 4.9*  --  5.3*  --  3.7*   MG 2.3  --  2.2  --  2.2   ANIONGAP 18*  --  14  --  13   LABGLOM 9*  --  8*  --  13*   CALCIUM 9.9  --  9.4  --  9.3   TROPHS 394*   < > 485* 471* 436*    < > = values in this interval not displayed.     Recent Labs     07/07/24  0951 07/07/24  0957 07/07/24  1611 07/08/24  0750 07/08/24  1223 07/08/24  1649 07/08/24 2007 07/09/24  0008 07/09/24  0806   LABA1C  --  6.7*  --   --   --   --   --   --   --    CHOL 108  --   --   --   --   --   --   --   --    HDL 44  --   --   --   --   --   --   --   --    CHOLHDLRATIO 2.0  --   --   --   --   --   --   --   --    TRIG 227*  --   --   --   --   --   --   --   --    VLDL 45  --   --   --   --   --   --   --   --    POCGLU  --   --    < > 181* 184* 279* 247* 211* 235*    < > = values in this interval not displayed.     ABG:  Lab Results   Component Value Date/Time    PHART 7.429 04/24/2022 01:42 PM    MDN3ZJF 45.9 04/24/2022 01:42 PM    PO2ART 47.6 04/24/2022 01:42 PM    UHO4UZQ 30.4 04/24/2022 01:42 PM    PBEA 6.1 04/24/2022 01:42 PM    N5KADBDR 82.8 04/24/2022 01:42 PM    FIO2 INFORMATION NOT PROVIDED 01/25/2023 10:59 AM     Lab Results   Component Value Date/Time    SPECIAL [id number] 02/25/2023 03:15 AM     Lab Results   Component Value Date/Time    CULTURE NO SIGNIFICANT GROWTH 06/11/2024 12:00 PM       Radiology:  XR CHEST PORTABLE    Result Date: 7/7/2024  1. No acute cardiopulmonary process. 2. Mild cardiomegaly.       Physical Examination:        General  appearance:  alert, cooperative and no distress  Mental Status:  oriented to person, place and time and normal affect  Lungs:  clear to auscultation bilaterally, normal effort  Heart:  regular rate and rhythm, no murmur  Abdomen:  soft, nontender, nondistended, normal bowel sounds, no masses, hepatomegaly, splenomegaly  Extremities:  no edema, redness, tenderness in the calves  Skin:  no gross lesions, rashes, induration    Assessment:        Hospital Problems             Last Modified POA    * (Principal) NSTEMI (non-ST elevated myocardial infarction) ([facility]) 7/7/2024 Yes    Chronic respiratory failure with hypoxia ([facility]) 7/7/2024 Yes    Mixed hyperlipidemia 7/7/2024 Yes    Type 2 diabetes 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, with long-term current use of insulin ([facility]) 7/7/2024 Yes    Essential hypertension 7/7/2024 Yes    Anemia of chronic disease 7/7/2024 Yes    Chronic ischemic heart disease 7/7/2024 Yes    Morbid obesity with BMI of 40.0-44.9, adult ([facility]) 7/7/2024 Yes    Anxiety 7/7/2024 Yes    ESRD (end stage renal disease) on dialysis ([facility]) 7/7/2024 Yes    ERICK (obstructive sleep apnea) 7/7/2024 Yes       Plan:        Am treating as MI as no other definitive cause of elevated troponins above her baseline.  Already on statin, aspirin and effient.  Beta blocker not given when sbp<120 due to issues with hypotension on dialysis.  Will adjust this to be given on non-dialysis days unless very low bp.  This way she will get more beta blockade  D/w sister in detail  D/w macarena Gill, DO  7/9/2024  12:28 PM

## 2024-07-09 NOTE — PLAN OF CARE
Problem: Discharge Planning  Goal: Discharge to home or other facility with appropriate resources  Outcome: Progressing     Problem: Chronic Conditions and Co-morbidities  Goal: Patient's chronic conditions and co-morbidity symptoms are monitored and maintained or improved  Outcome: Progressing     Problem: Safety - Adult  Goal: Free from fall injury  Outcome: Progressing     Problem: Skin/Tissue Integrity  Goal: Absence of new skin breakdown  Description: 1.  Monitor for areas of redness and/or skin breakdown  2.  Assess vascular access sites hourly  3.  Every 4-6 hours minimum:  Change oxygen saturation probe site  4.  Every 4-6 hours:  If on nasal continuous positive airway pressure, respiratory therapy assess nares and determine need for appliance change or resting period.  Outcome: Progressing     Problem: ABCDS Injury Assessment  Goal: Absence of physical injury  Outcome: Progressing     Problem: Pain  Goal: Verbalizes/displays adequate comfort level or baseline comfort level  Outcome: Progressing     Problem: Cardiovascular - Adult  Goal: Maintains optimal cardiac output and hemodynamic stability  Outcome: Progressing  Goal: Absence of cardiac dysrhythmias or at baseline  Outcome: Progressing

## 2024-07-09 NOTE — CARE COORDINATION
Social Work-Recievd another call from [facility]. They are unsure if patient can return prior to auth. They are checking. Deja

## 2024-07-09 NOTE — PROGRESS NOTES
End Of Shift Note  [facility]  Summary of shift: pt still on heparin drip. 2 L of fluid off for HD. Cardiology to recheck troponin in the AM. Pt still on heparin drip. Pt going back to [facility] but needs new Presert.     Vitals:    Vitals:    07/08/24 1330 07/08/24 1335 07/08/24 1338 07/08/24 1600   BP: 110/67 (!) 109/49 (!) 111/51 (!) 112/91   Pulse: 81 67 68 81   Resp:       Temp:   98.1 °F (36.7 °C) 97.5 °F (36.4 °C)   TempSrc:    Temporal   SpO2:    94%   Weight:   113.5 kg (250 lb 3.6 oz)    Height:            I&O:   Intake/Output Summary (Last 24 hours) at 7/8/2024 2019  Last data filed at 7/8/2024 1338  Gross per 24 hour   Intake 300 ml   Output 3400 ml   Net -3100 ml       Resp Status: 4 L NC    Ventilator Settings:     / / /     Critical Care IV infusions:   sodium chloride      heparin (PORCINE) Infusion 14 Units/kg/hr (07/08/24 1725)    dextrose          LDA:   Peripheral IV 07/07/24 Right Antecubital (Active)   Number of days: 1       External Urinary Catheter (Active)   Number of days: 1       Hemodialysis Fistula/Graft Left Forearm (Active)   Number of days:        Hemodialysis Fistula/Graft Left Arm (Active)   Number of days: 763       Hemodialysis Fistula/Graft Arteriovenous fistula Left Forearm (Active)   Number of days:        Wound 06/09/24 Buttocks Left;Right (Active)   Number of days: 29       Wound 07/07/24 Radial Right (Active)   Number of days: 1

## 2024-07-09 NOTE — CARE COORDINATION
Social Work-Phone call from [facility]. Pt can not return without a precert.Cancel transport with [facility]. Deja

## 2024-07-09 NOTE — DISCHARGE SUMMARY
adult (HCC)    Anxiety    ESRD (end stage renal disease) on dialysis (HCC)    ERICK (obstructive sleep apnea)  Resolved Problems:    * No resolved hospital problems. *      Admission Condition:  fair     Discharged Condition: stable    Hospital Stay:     Hospital Course:  Estefany Garcia is a 64 y.o. female who was admitted for the management of  NSTEMI (non-ST elevated myocardial infarction) ([facility]) , presented to ER with No chief complaint on file.    This 64 yof presents with cp and elevated troponins.  She does have h/o elevated tropoins as she is a dialysis patient, but this was higher than baseline. She was seen by cardiology and aspirin, effient and lipitor continued.  Coreg parameters for holding were changed to just on dialysis days to allow her to get more beta blockade.    Significant therapeutic interventions: see above    Significant Diagnostic Studies:   Labs / Micro:  CBC:   Lab Results   Component Value Date/Time    WBC 5.9 07/09/2024 06:08 AM    RBC 2.66 07/09/2024 06:08 AM    HGB 9.9 07/12/2024 06:09 AM    HCT 29.6 07/12/2024 06:09 AM    .7 07/09/2024 06:08 AM    MCH 36.8 07/09/2024 06:08 AM    MCHC 33.0 07/09/2024 06:08 AM    RDW 16.3 07/09/2024 06:08 AM     07/09/2024 06:08 AM     CMP:    Lab Results   Component Value Date/Time    GLUCOSE 164 07/12/2024 06:09 AM     07/12/2024 06:09 AM    K 3.9 07/12/2024 06:09 AM     07/12/2024 06:09 AM    CO2 24 07/12/2024 06:09 AM    BUN 30 07/12/2024 06:09 AM    CREATININE 4.0 07/12/2024 06:09 AM    ANIONGAP 12 07/12/2024 06:09 AM    ALKPHOS 145 06/19/2024 08:05 AM    ALT 27 06/19/2024 08:05 AM    AST 50 06/19/2024 08:05 AM    BILITOT 0.7 06/19/2024 08:05 AM    ALBUMIN 3.7 06/19/2024 08:05 AM    LABGLOM 12 07/12/2024 06:09 AM    LABGLOM 10 03/22/2024 05:22 AM    GFRAA 27 09/06/2022 04:40 PM    GFR      09/06/2022 04:40 PM    CALCIUM 9.3 07/12/2024 06:09 AM        Radiology:  XR CHEST PORTABLE    Result Date: 7/7/2024  1. No acute  cardiopulmonary process. 2. Mild cardiomegaly.       Consultations:    Consults:     Final Specialist Recommendations/Findings:   IP CONSULT TO CARDIOLOGY  IP CONSULT TO NEPHROLOGY      The patient was seen and examined on day of discharge and this discharge summary is in conjunction with any daily progress note from day of discharge.    Discharge plan:     Disposition: snf    Physician Follow Up:     Zulma Payne, APRN - CNP  [address]  [address]  [place] [address]  [phone]    Follow up in 4 week(s)      Rustam Katz DO  [address]  [address]  [place] [address]  [phone]    Follow up in 3 week(s)         Requiring Further Evaluation/Follow Up POST HOSPITALIZATION/Incidental Findings: chest pain    Consider Imdur but has tendency towards hypotension with dialysis, so this was not started    Diet: cardiac diet, diabetic diet, and renal diet    Activity: As tolerated    Instructions to Patient: take medications as prescribed      Discharge Medications:      Medication List        CHANGE how you take these medications      carvedilol 3.125 MG tablet  Commonly known as: Coreg  Take 1 tablet by mouth 2 times daily  What changed: additional instructions     Dexcom G6 Sensor Misc  USE TO CHECK BLOOD SUGAR 4 TIMES DAILY, CHANGE EVERY 10 DAYS  What changed: See the new instructions.            CONTINUE taking these medications      acetaminophen 325 MG tablet  Commonly known as: TYLENOL  Take 2 tablets by mouth every 6 hours as needed for Pain     allopurinol 100 MG tablet  Commonly known as: ZYLOPRIM  Take 1 tablet by mouth daily     aspirin 81 MG chewable tablet  Take 1 tablet by mouth daily     atorvastatin 80 MG tablet  Commonly known as: LIPITOR  Take 1 tablet by mouth nightly     Basaglar KwikPen 100 UNIT/ML injection pen  Generic drug: insulin glargine  INJECT 72 UNITS SUBCUTANEOUSLY IN THE MORNING AND 42 AT BEDTIME     bumetanide 1 MG tablet  Commonly known as: BUMEX  Take 3 tablets by mouth 2  times daily     busPIRone 10 MG tablet  Commonly known as: BUSPAR  Take 1 tablet by mouth 3 times daily     cyclobenzaprine 5 MG tablet  Commonly known as: FLEXERIL  Take 1 tablet by mouth three times daily as needed for muscle spasm     Dexcom G6  Lily  1 each by Does not apply route 4 times daily     docusate sodium 100 MG capsule  Commonly known as: COLACE  Take 1 capsule by mouth 2 times daily     Easy Touch Pen Needles 29G X 12MM Misc  Generic drug: Insulin Pen Needle  5 each by Does not apply route daily     FeroSul 325 (65 Fe) MG tablet  Generic drug: ferrous sulfate     FIBER-CAPS PO     HYDROcodone-acetaminophen 5-325 MG per tablet  Commonly known as: NORCO  Take 1 tablet by mouth every 4 hours as needed for Pain for up to 7 days. Max Daily Amount: 6 tablets     Lancets Misc  1 each by Does not apply route daily     Magnesium 400 MG Caps  Take 1 capsule by mouth daily     Melatonin 10 MG Tabs     midodrine 5 MG tablet  Commonly known as: PROAMATINE     nitroGLYCERIN 0.4 MG SL tablet  Commonly known as: NITROSTAT  Place 1 tablet under the tongue every 5 minutes as needed for Chest pain up to max of 3 total doses. If no relief after 1 dose, call 911.     NovoLOG FlexPen 100 UNIT/ML injection pen  Generic drug: insulin aspart  Inject 20 Units into the skin 3 times daily (before meals) Inject 20 units into the skin 3 times daily, before meals.  Additionally sliding scale coverage as needed 3 times a day: for blood sugar  no additional insulin.  Sugar 140-199 give 3 units.  Sugar 200-249 give 6 units.  Sugar 250-299 give 9 units.  Sugar 300-3 49 give 12 units.  Sugar 3  give 15 units.  Sugar over 400 give 18 units.    Maximum allowable amount 103 units daily     polyvinyl alcohol 1.4 % ophthalmic solution  Commonly known as: LIQUIFILM TEARS     prasugrel 10 MG Tabs  Commonly known as: EFFIENT  Take 1 tablet by mouth daily     Procrit 3000 UNIT/ML injection  Generic drug: epoetin raphael  Inject

## 2024-07-09 NOTE — PROGRESS NOTES
Physical Therapy  Facility/Department: [facility]  Physical Therapy Initial Assessment    Name: Estefany Garcia  : 1960  MRN: [id number]  Date of Service: 2024    Per H&P:64 y.o.  female with a history of type 2 diabetes, ESRD on HD, CAD s/p CABG and PCI(6 stents), CHF, ERICK who initially presented to [place] ED from nursing facility after waking up with shortness of breath and chest pain and is admitted to the hospital for the management of NSTEMI (non-ST elevated myocardial infarction) (Formerly [facility]).  Nursing staff gave her 2 sublingual nitros and symptoms had resolved by the time she had arrived to the ED.  Patient reports her family had signed her out for a \"day pass\" the day before for a holiday gathering where she spent most of the day in the heat.  She also reports she fell asleep without her CPAP last night.  ER physician reported no ischemic or acute changes on EKG  Initial troponin 374  Chest x-ray unremarkable for acute process.        DEDE Power reports patient is medically stable for therapy treatment this date.    Chart reviewed prior to treatment and patient is agreeable for therapy.  All lines intact and patient positioned comfortably at end of treatment.  All patient needs addressed prior to ending therapy session.     Discharge Recommendations:  Patient would benefit from continued therapy after discharge   Pt currently functioning below baseline.  Recommend daily inpatient skilled therapy at time of discharge to maximize long term outcomes and prevent re-admission. Please refer to AM-PAC score for current level of function.          Patient Diagnosis(es): The encounter diagnosis was Closed fracture of right wrist, initial encounter.  Past Medical History:  has a past medical history of Arthritis, Backache, unspecified, CAD (coronary artery disease), Cerebral artery occlusion with cerebral infarction (HCC), CHF (congestive heart failure) (Formerly [facility]), Chronic kidney disease, Coronary  atherosclerosis of artery bypass graft, COVID, Cramp of limb, Epistaxis, Gallstones, Hemodialysis patient (HCC), Hyperlipidemia, Hypertension, Insomnia, Neuromuscular disorder (HCC), Pneumonia, Psychiatric problem, Thyroid disease, Type II or unspecified type diabetes mellitus with renal manifestations, not stated as uncontrolled(250.40), Type II or unspecified type diabetes mellitus without mention of complication, not stated as uncontrolled, and Unspecified vitamin D deficiency.  Past Surgical History:  has a past surgical history that includes Coronary artery bypass graft (02/2005); Knee arthroscopy; Carpal tunnel release; Breast surgery; Tonsillectomy; Hand surgery; Ankle fracture surgery; Cholecystectomy, open (N/A); IR TUNNELED CVC PLACE WO SQ PORT/PUMP > 5 YEARS (08/18/2021); AV fistula creation (12/14/2021); Dialysis fistula creation (Left, 12/14/2021); other surgical history (04/06/2022); back surgery; Colonoscopy; eye surgery; fracture surgery; Cardiac surgery; IR TUNNELED CVC PLACE WO SQ PORT/PUMP > 5 YEARS (03/03/2023); IR TUNNELED CVC PLACE WO SQ PORT/PUMP > 5 YEARS (Right, 04/13/2023); Dialysis Catheter Insertion (Right, 04/13/2023); other surgical history (Left, 04/18/2023); Coronary angioplasty with stent (02/14/2023); Cardiac catheterization (2005); Coronary angioplasty with stent (2019); Dialysis fistula creation (Left, 04/19/2023); IR TUNNELED CVC PLACE WO SQ PORT/PUMP > 5 YEARS (06/07/2023); IR TUNNELED CVC PLACE WO SQ PORT/PUMP > 5 YEARS (06/08/2023); Coronary angioplasty with stent (11/03/2023); Cardiac procedure (N/A, 11/03/2023); Percutaneous Transluminal Coronary Angio (11/15/2023); Cardiac procedure (N/A, 11/15/2023); vascular surgery (Left, 02/08/2024); vascular surgery (Left, 02/08/2024); Cardiac procedure (N/A, 02/28/2024); Cardiac procedure (N/A, 02/28/2024); Breast reduction surgery; Fistulagram (Left, 4/25/2024); and Fistulagram (Left, 7/1/2024).    Assessment   Body Structures,  Education  Education Given To: Patient  Education Provided: Role of Therapy;Plan of Care;Precautions  Verbal edu provided regarding fall prevention in the areas of community safety, transportation, proper footwear and clothing, reducing risk of falls, environmental modifications, importance of exercise, consequences of falling, plan if a fall occurs (\"rest and wait\" vs \"up and about\").   Barriers to Learning: None  Education Outcome: Verbalized understanding      Therapy Time   Individual Concurrent Group Co-treatment   Time In 1440         Time Out 1530         Minutes 50 + 10 = 60 minutes          Additional 10 minutes for chart review.  Treatment Time: 38 minutes       Mary Ellen Nobles, PT

## 2024-07-09 NOTE — PROGRESS NOTES
End Of Shift Note  [facility]  Summary of shift: pt had uneventful day, was going to discharge today to [facility] but need precert first. Oliguric output. Pt will desat on RA occasionally to 88% but then increase to 94%, 2lpm NC applied currently    Vitals:    Vitals:    07/09/24 1525 07/09/24 1541 07/09/24 1558 07/09/24 1600   BP: 104/63 104/63 112/60 112/60   Pulse: 71 67 76 76   Resp:  20     Temp:  97.3 °F (36.3 °C)     TempSrc:  Temporal     SpO2:  99%     Weight:       Height:            I&O:   Intake/Output Summary (Last 24 hours) at 7/9/2024 1848  Last data filed at 7/9/2024 1204  Gross per 24 hour   Intake --   Output 200 ml   Net -200 ml       Resp Status: RA 2lpm NC night    Ventilator Settings:     / / /     Critical Care IV infusions:   sodium chloride      dextrose          LDA:   Peripheral IV 07/07/24 Right Antecubital (Active)   Number of days: 2       External Urinary Catheter (Active)   Number of days: 2       Hemodialysis Fistula/Graft Left Forearm (Active)   Number of days:        Hemodialysis Fistula/Graft Left Arm (Active)   Number of days: 763       Hemodialysis Fistula/Graft Arteriovenous fistula Left Forearm (Active)   Number of days:        Wound 06/09/24 Buttocks Left;Right (Active)   Number of days: 30       Wound 07/07/24 Radial Right (Active)   Number of days: 2

## 2024-07-09 NOTE — PROGRESS NOTES
[facility]  Progress Note                   Date:   7/9/2024  Patient name: Estefany Garcia  Date of admission:  7/7/2024  9:25 AM  MRN:   [id number]  YOB: 1960  PCP: Zulma Payne APRN - CNP    Reason for Admission: NSTEMI (non-ST elevated myocardial infarction) (HCC) [I21.4]    Subjective:       Clinical Changes /Abnormalities:Patient seen and examined in room. Denies CP or SOB. No acute CV events overnight. Labs, vitals, and tele reviewed. On HD per Nephro.     Review of Systems    Medications:   Scheduled Meds:   sodium chloride flush  5-40 mL IntraVENous 2 times per day    atorvastatin  80 mg Oral Nightly    aspirin  81 mg Oral Daily    carvedilol  3.125 mg Oral BID    bumetanide  3 mg Oral BID    epoetin raphael-epbx  3,000 Units SubCUTAneous Once per day on Mon Wed Fri    insulin glargine  72 Units SubCUTAneous QAM    sodium bicarbonate  1,300 mg Oral BID    insulin lispro  0-8 Units SubCUTAneous TID WC    insulin lispro  0-4 Units SubCUTAneous Nightly    allopurinol  100 mg Oral Daily    docusate sodium  100 mg Oral BID    busPIRone  10 mg Oral TID    sevelamer  1,600 mg Oral TID WC    venlafaxine  75 mg Oral Daily with breakfast    prasugrel  10 mg Oral Daily    magnesium oxide  400 mg Oral Daily    ferrous sulfate  325 mg Oral Daily    insulin glargine  42 Units SubCUTAneous Nightly     Continuous Infusions:   sodium chloride      heparin (PORCINE) Infusion 14 Units/kg/hr (07/08/24 1725)    dextrose       CBC:   Recent Labs     07/07/24  0447 07/08/24  0658 07/09/24  0608   WBC 5.5 6.0 5.9   HGB 10.1* 9.9* 9.8*    163 139     BMP:    Recent Labs     07/07/24  0951 07/08/24  0607 07/09/24  0608    142 137   K 4.3 4.5 4.4   CL 98 102 102   CO2 23 26 22   BUN 35* 39* 25*   CREATININE 4.9* 5.3* 3.7*   GLUCOSE 315* 171* 242*     Hepatic:No results for input(s): \"AST\", \"ALT\", \"BILITOT\", \"ALKPHOS\" in the last 72 hours.    Invalid input(s): \"ALB\"  Troponin:   Recent  diameter incidentally noted on imaging study     Essential hypertension     Anemia of chronic disease     Chronic ischemic heart disease     Ischemic stroke of frontal lobe (HCC)     Morbid obesity with BMI of 40.0-44.9, adult ([facility])     Disequilibrium syndrome     Anxiety     Chronic midline low back pain with bilateral sciatica     Acute thoracic back pain     COVID     Delirium due to another medical condition     Cerebral hypoperfusion     Immature arteriovenous fistula ([facility])     History of fusion of cervical spine     Depression with anxiety     Vancomycin resistant Enterococcus UTI     ESRD on hemodialysis ([facility])     Dialysis disequilibrium syndrome     Acute cystitis without hematuria     VRE infection (vancomycin resistant Enterococcus)     Infection due to ESBL-producing Klebsiella pneumoniae     Class 2 severe obesity due to excess calories with serious comorbidity and body mass index (BMI) of 35.0 to 35.9 in adult ([facility])     Type 2 diabetes mellitus with hyperglycemia, with long-term current use of insulin ([facility])     Right hemiparesis ([facility])     Ulcer of left foot, limited to breakdown of skin ([facility])     Secondary hyperparathyroidism ([facility])     Hypertensive urgency     Hypoxia     Congestive heart failure ([facility])     Nephrotic range proteinuria     Acute respiratory failure with hypoxia ([facility])     Syncope and collapse     Subacute cough     Acute on chronic heart failure, unspecified heart failure type ([facility])     Diarrhea of presumed infectious origin     Epistaxis     Chronic respiratory failure with hypoxia (HCC)     Chest pain     Hemodialysis catheter dysfunction (HCC)     Dialysis AV fistula malfunction (HCC)     ESRD (end stage renal disease) (HCC)     ESRD (end stage renal disease) on dialysis ([facility])     Hypokalemia     ERICK (obstructive sleep apnea)     AMS (altered mental status)     Unstable angina ([facility])     Acute electrocardiogram changes     Renovascular hypertension     Episodic confusion     Chest pain

## 2024-07-09 NOTE — DISCHARGE INSTR - COC
Continuity of Care Form    Patient Name: Estefany Garcia   :  1960  MRN:  [id number]    Admit date:  2024  Discharge date:  24    Code Status Order: Full Code   Advance Directives:     Admitting Physician:  Madhu Rowland DO  PCP: Zulma Payne, APRN - CNP    Discharging Nurse: Tono AGUAYO  Discharging Hospital Unit/Room#: -  Discharging Unit Phone Number: [phone]    Emergency Contact:   Extended Emergency Contact Information  Primary Emergency Contact: Hailey Guido  Home Phone: [phone]  Relation: Brother/Sister  Secondary Emergency Contact: Nanyc Perdomo  Home Phone: [phone]  Relation: Brother/Sister    Past Surgical History:  Past Surgical History:   Procedure Laterality Date    ANKLE FRACTURE SURGERY      AV FISTULA CREATION  2021    BACK SURGERY      BREAST REDUCTION SURGERY      BREAST SURGERY      CARDIAC CATHETERIZATION      MVD  /  CT CONSULT    CARDIAC PROCEDURE N/A 2023    ron / Coronary angiography / op scmh performed by Jairo Ceja MD at [facility] CARDIAC CATH LAB    CARDIAC PROCEDURE N/A 11/15/2023    ron / Percutaneous coronary intervention performed by Jairo Ceja MD at [facility] CARDIAC CATH LAB    CARDIAC PROCEDURE N/A 2024    ron / Left heart cath / op scmh performed by Jairo Ceja MD at [facility] CARDIAC CATH LAB    CARDIAC PROCEDURE N/A 2024    Percutaneous coronary intervention performed by Jairo Ceja MD at [facility] CARDIAC CATH LAB    CARDIAC SURGERY      CARPAL TUNNEL RELEASE      CHOLECYSTECTOMY, OPEN N/A     COLONOSCOPY      CORONARY ANGIOPLASTY WITH STENT PLACEMENT  2023    PTCA / FADIA RCA    CORONARY ANGIOPLASTY WITH STENT PLACEMENT  2019    STENT X1 AT [facility]    CORONARY ANGIOPLASTY WITH STENT PLACEMENT  2023    DR CEJA  /  FADAI SVG-DIAG  /  NEEDS RCA DONE    CORONARY ARTERY BYPASS GRAFT  02/2005    X3 [facility]    DIALYSIS CATHETER INSERTION Right 2023    CVC CATHETER REPLACEMENT  Assisted  Dressing  Assisted  Toileting  Assisted  Feeding  Independent  Med Admin  Assisted  Med Delivery   none    Wound Care Documentation and Therapy:  Wound 06/09/24 Buttocks Left;Right (Active)   Wound Etiology Pressure Stage 1 07/08/24 1600   Dressing/Treatment Open to air 07/08/24 1600   Wound Assessment Pink/red 07/08/24 1600   Drainage Amount None (dry) 07/08/24 1600   Odor None 07/08/24 1600   Number of days: 29       Wound 07/07/24 Radial Right (Active)   Wound Etiology Traumatic 07/09/24 0400   Dressing Status Clean;Dry;Intact 07/09/24 0400   Wound Cleansed Not Cleansed 07/09/24 0400   Dressing/Treatment Splint 07/09/24 0400   Wound Assessment Other (Comment) 07/09/24 0400   Number of days: 1        Elimination:  Continence:   Bowel: Yes  Bladder: Yes  Urinary Catheter: None   Colostomy/Ileostomy/Ileal Conduit: No       Date of Last BM: 7/7/24    Intake/Output Summary (Last 24 hours) at 7/9/2024 0908  Last data filed at 7/8/2024 1338  Gross per 24 hour   Intake 300 ml   Output 2300 ml   Net -2000 ml     I/O last 3 completed shifts:  In: 300   Out: 3400 [Urine:1100]    Safety Concerns:     At Risk for Falls History of falls    Impairments/Disabilities:      None    Nutrition Therapy:  Current Nutrition Therapy:   - Oral Diet:  General    Routes of Feeding: Oral  Liquids: No Restrictions  Daily Fluid Restriction: no  Last Modified Barium Swallow with Video (Video Swallowing Test): not done    Treatments at the Time of Hospital Discharge:   Respiratory Treatments: As ordered  Oxygen Therapy:  is not on home oxygen therapy.  Ventilator:    - No ventilator support    Rehab Therapies: Physical Therapy and Occupational Therapy  Weight Bearing Status/Restrictions: No weight bearing restrictions  Other Medical Equipment (for information only, NOT a DME order):  wheelchair and walker  Other Treatments: N/A    Patient's personal belongings (please select all that are sent with patient):  Clothes, all belongings w

## 2024-07-10 LAB
ANION GAP SERPL CALCULATED.3IONS-SCNC: 14 MMOL/L (ref 9–17)
BUN SERPL-MCNC: 39 MG/DL (ref 8–23)
BUN/CREAT SERPL: 8 (ref 9–20)
CALCIUM SERPL-MCNC: 9.7 MG/DL (ref 8.6–10.4)
CHLORIDE SERPL-SCNC: 98 MMOL/L (ref 98–107)
CO2 SERPL-SCNC: 23 MMOL/L (ref 20–31)
CREAT SERPL-MCNC: 4.7 MG/DL (ref 0.5–0.9)
GFR, ESTIMATED: 10 ML/MIN/1.73M2
GLUCOSE BLD-MCNC: 157 MG/DL (ref 65–105)
GLUCOSE BLD-MCNC: 249 MG/DL (ref 65–105)
GLUCOSE BLD-MCNC: 278 MG/DL (ref 65–105)
GLUCOSE SERPL-MCNC: 319 MG/DL (ref 70–99)
HCT VFR BLD AUTO: 30 % (ref 36.3–47.1)
HGB BLD-MCNC: 10 G/DL (ref 11.9–15.1)
POTASSIUM SERPL-SCNC: 4.4 MMOL/L (ref 3.7–5.3)
SODIUM SERPL-SCNC: 135 MMOL/L (ref 135–144)

## 2024-07-10 PROCEDURE — 2700000000 HC OXYGEN THERAPY PER DAY

## 2024-07-10 PROCEDURE — 36415 COLL VENOUS BLD VENIPUNCTURE: CPT

## 2024-07-10 PROCEDURE — 97166 OT EVAL MOD COMPLEX 45 MIN: CPT

## 2024-07-10 PROCEDURE — 97535 SELF CARE MNGMENT TRAINING: CPT

## 2024-07-10 PROCEDURE — 6370000000 HC RX 637 (ALT 250 FOR IP): Performed by: INTERNAL MEDICINE

## 2024-07-10 PROCEDURE — 97164 PT RE-EVAL EST PLAN CARE: CPT

## 2024-07-10 PROCEDURE — 90935 HEMODIALYSIS ONE EVALUATION: CPT

## 2024-07-10 PROCEDURE — 99233 SBSQ HOSP IP/OBS HIGH 50: CPT | Performed by: NURSE PRACTITIONER

## 2024-07-10 PROCEDURE — 6370000000 HC RX 637 (ALT 250 FOR IP): Performed by: NURSE PRACTITIONER

## 2024-07-10 PROCEDURE — 85018 HEMOGLOBIN: CPT

## 2024-07-10 PROCEDURE — 2060000000 HC ICU INTERMEDIATE R&B

## 2024-07-10 PROCEDURE — 2580000003 HC RX 258: Performed by: NURSE PRACTITIONER

## 2024-07-10 PROCEDURE — 97530 THERAPEUTIC ACTIVITIES: CPT

## 2024-07-10 PROCEDURE — 82947 ASSAY GLUCOSE BLOOD QUANT: CPT

## 2024-07-10 PROCEDURE — 80048 BASIC METABOLIC PNL TOTAL CA: CPT

## 2024-07-10 PROCEDURE — 85014 HEMATOCRIT: CPT

## 2024-07-10 PROCEDURE — 94761 N-INVAS EAR/PLS OXIMETRY MLT: CPT

## 2024-07-10 PROCEDURE — 99231 SBSQ HOSP IP/OBS SF/LOW 25: CPT | Performed by: INTERNAL MEDICINE

## 2024-07-10 RX ORDER — TRAMADOL HYDROCHLORIDE 50 MG/1
50 TABLET ORAL EVERY 4 HOURS PRN
Status: DISCONTINUED | OUTPATIENT
Start: 2024-07-10 | End: 2024-07-11

## 2024-07-10 RX ADMIN — FERROUS SULFATE TAB EC 325 MG (65 MG FE EQUIVALENT) 325 MG: 325 (65 FE) TABLET DELAYED RESPONSE at 13:08

## 2024-07-10 RX ADMIN — BUSPIRONE HYDROCHLORIDE 10 MG: 10 TABLET ORAL at 21:20

## 2024-07-10 RX ADMIN — INSULIN GLARGINE 72 UNITS: 100 INJECTION, SOLUTION SUBCUTANEOUS at 08:29

## 2024-07-10 RX ADMIN — BUMETANIDE 3 MG: 1 TABLET ORAL at 13:06

## 2024-07-10 RX ADMIN — DOCUSATE SODIUM 100 MG: 100 CAPSULE, LIQUID FILLED ORAL at 21:17

## 2024-07-10 RX ADMIN — SEVELAMER CARBONATE 1600 MG: 800 TABLET, FILM COATED ORAL at 13:01

## 2024-07-10 RX ADMIN — Medication 9 MG: at 21:34

## 2024-07-10 RX ADMIN — ATORVASTATIN CALCIUM 80 MG: 80 TABLET, FILM COATED ORAL at 21:17

## 2024-07-10 RX ADMIN — VENLAFAXINE HYDROCHLORIDE 75 MG: 75 CAPSULE, EXTENDED RELEASE ORAL at 13:08

## 2024-07-10 RX ADMIN — SODIUM BICARBONATE 1300 MG: 650 TABLET ORAL at 21:16

## 2024-07-10 RX ADMIN — ALLOPURINOL 100 MG: 100 TABLET ORAL at 13:07

## 2024-07-10 RX ADMIN — Medication 16 G: at 13:01

## 2024-07-10 RX ADMIN — SEVELAMER CARBONATE 1600 MG: 800 TABLET, FILM COATED ORAL at 17:48

## 2024-07-10 RX ADMIN — CARVEDILOL 3.12 MG: 3.12 TABLET, FILM COATED ORAL at 21:19

## 2024-07-10 RX ADMIN — SODIUM CHLORIDE, PRESERVATIVE FREE 10 ML: 5 INJECTION INTRAVENOUS at 21:20

## 2024-07-10 RX ADMIN — POLYETHYLENE GLYCOL 3350 17 G: 17 POWDER, FOR SOLUTION ORAL at 21:20

## 2024-07-10 RX ADMIN — MIDODRINE HYDROCHLORIDE 5 MG: 5 TABLET ORAL at 12:54

## 2024-07-10 RX ADMIN — SODIUM BICARBONATE 1300 MG: 650 TABLET ORAL at 13:06

## 2024-07-10 RX ADMIN — CYCLOBENZAPRINE HYDROCHLORIDE 5 MG: 5 TABLET, FILM COATED ORAL at 13:08

## 2024-07-10 RX ADMIN — CYCLOBENZAPRINE HYDROCHLORIDE 5 MG: 5 TABLET, FILM COATED ORAL at 20:24

## 2024-07-10 RX ADMIN — INSULIN LISPRO 2 UNITS: 100 INJECTION, SOLUTION INTRAVENOUS; SUBCUTANEOUS at 08:29

## 2024-07-10 RX ADMIN — INSULIN GLARGINE 42 UNITS: 100 INJECTION, SOLUTION SUBCUTANEOUS at 21:16

## 2024-07-10 RX ADMIN — POLYETHYLENE GLYCOL 400 AND PROPYLENE GLYCOL 1 DROP: 4; 3 SOLUTION/ DROPS OPHTHALMIC at 21:17

## 2024-07-10 RX ADMIN — ACETAMINOPHEN 650 MG: 325 TABLET ORAL at 21:20

## 2024-07-10 RX ADMIN — SODIUM CHLORIDE, PRESERVATIVE FREE 10 ML: 5 INJECTION INTRAVENOUS at 08:32

## 2024-07-10 RX ADMIN — DOCUSATE SODIUM 100 MG: 100 CAPSULE, LIQUID FILLED ORAL at 13:08

## 2024-07-10 RX ADMIN — INSULIN LISPRO 4 UNITS: 100 INJECTION, SOLUTION INTRAVENOUS; SUBCUTANEOUS at 21:15

## 2024-07-10 RX ADMIN — INSULIN LISPRO 4 UNITS: 100 INJECTION, SOLUTION INTRAVENOUS; SUBCUTANEOUS at 17:48

## 2024-07-10 RX ADMIN — PRASUGREL 10 MG: 10 TABLET, FILM COATED ORAL at 13:08

## 2024-07-10 RX ADMIN — BUSPIRONE HYDROCHLORIDE 10 MG: 10 TABLET ORAL at 13:01

## 2024-07-10 RX ADMIN — ASPIRIN 81 MG CHEWABLE TABLET 81 MG: 81 TABLET CHEWABLE at 13:08

## 2024-07-10 RX ADMIN — Medication 400 MG: at 21:20

## 2024-07-10 RX ADMIN — BUMETANIDE 3 MG: 1 TABLET ORAL at 21:18

## 2024-07-10 ASSESSMENT — PAIN DESCRIPTION - ORIENTATION
ORIENTATION: RIGHT
ORIENTATION: RIGHT

## 2024-07-10 ASSESSMENT — PAIN SCALES - GENERAL
PAINLEVEL_OUTOF10: 0
PAINLEVEL_OUTOF10: 6
PAINLEVEL_OUTOF10: 0

## 2024-07-10 ASSESSMENT — PAIN DESCRIPTION - LOCATION
LOCATION: ARM;WRIST
LOCATION: ARM;WRIST

## 2024-07-10 ASSESSMENT — PAIN DESCRIPTION - DESCRIPTORS
DESCRIPTORS: ACHING
DESCRIPTORS: ACHING

## 2024-07-10 NOTE — PROGRESS NOTES
End Of Shift Note  [facility]  Summary of shift: Patient resting in bed. She got up to the chair for several hours today and did well. She had a bowel movement today via bed side commode. She had dialysis today and needed two doses of midodrine. The plan is still [facility] [place] once pre cert is received.     Vitals:    Vitals:    07/10/24 1230 07/10/24 1248 07/10/24 1250 07/10/24 1600   BP: (!) 99/53 (!) 85/50 (!) 95/44 (!) 94/47   Pulse: 70 71 75 73   Resp:   18    Temp:   97.3 °F (36.3 °C) 97.5 °F (36.4 °C)   TempSrc:    Temporal   SpO2:    92%   Weight:   112.9 kg (248 lb 14.4 oz)    Height:            I&O:   Intake/Output Summary (Last 24 hours) at 7/10/2024 1838  Last data filed at 7/10/2024 1250  Gross per 24 hour   Intake 500 ml   Output 1800 ml   Net -1300 ml       Resp Status: 3 liters NC    Ventilator Settings:     / / /     Critical Care IV infusions:   sodium chloride      dextrose          LDA:   Peripheral IV 07/07/24 Right Antecubital (Active)   Number of days: 3       External Urinary Catheter (Active)   Number of days: 3       Hemodialysis Fistula/Graft Left Forearm (Active)   Number of days:        Hemodialysis Fistula/Graft Left Arm (Active)   Number of days: 764       Hemodialysis Fistula/Graft Arteriovenous fistula Left Forearm (Active)   Number of days:        Wound 06/09/24 Buttocks Left;Right (Active)   Number of days: 31       Wound 07/07/24 Radial Right (Active)   Number of days: 3

## 2024-07-10 NOTE — PROGRESS NOTES
[facility]  Office: [phone]  Laith Shirley DO, Eric Nicholson, DO, Madhu Rowland DO, Sergio Gill, DO, Dinh Graham MD, Mirela Knowles MD, Wilmer Francois MD, Nancy Bullock MD,  Shaw Correa MD, Heladio Sanchez MD, Genaro Hoyt MD,  Gini Humphrey DO, Franklin Anthony MD, Ang Jorgensen MD, Van Shirley DO, Richa Godinez MD,  Rakesh Curtis DO, Quiana Soares MD, Liset Reyez MD, Irlanda Knight MD, Anders Call MD,  Anirudh Dias MD, Charissa Driver MD, John Bell MD, Donnie Mancilla MD, Yinka Caputo MD, Ehsan Patel MD, Marcelino De La Vega DO, Benny Estrada DO, Jenny Hawley MD,  Bao Torres MD, Shirley Waterhouse, CNP,  Christine Boss CNP, Adrián Smyth, CNP,  Estefany Stallings, DNP, Viji Velazquez, CNP, Carla Rae, CNP, Oneyda Marsh CNP, Danielle Gamble, CNP, Katherin Brewer, PA-C, Sarita Prakash PA-C, Renuka Alba, CNP, Regine Snow, CNP, Mesha Singh, CNP, Megan Carpio, CNP, Libby Massey CNP, Pat Lombardo, CNS, Karla Gutiérrez, CNP, Ami Laguerre CNP, Tracy Schwab, CNP         [facility]   IN-PATIENT SERVICE   [facility]    Progress Note    7/10/2024    8:13 AM    Name:   Estefany Garcia  MRN:     [id number]     Acct:      [id number]   Room:   2042/2042-01  IP Day:  3  Admit Date:  7/7/2024  9:25 AM    PCP:   Zulma Payne APRN - CNP  Code Status:  Full Code    Subjective:     C/C: cp  Interval History Status: improved.     No further cp  No sob/n/v    Lightheaded with therapy yesterday    Brief History:     Per my partner:  \"This is a 64-year-old female with a history of end-stage renal disease, coronary disease and congestive heart failure that presents to [facility] as a transfer from [facility] with a complaint of chest pain and shortness of breath and findings of elevated troponins. Discomfort non-STEMI and she has been started on a heparin drip and admitted for further investigation. Troponin elevation has  process. 2. Mild cardiomegaly.       Physical Examination:        General appearance:  alert, cooperative and no distress  Mental Status:  oriented to person, place and time and normal affect  Lungs:  clear to auscultation bilaterally, normal effort  Heart:  regular rate and rhythm, no murmur  Abdomen:  soft, nontender, nondistended, normal bowel sounds, no masses, hepatomegaly, splenomegaly  Extremities:  no edema, redness, tenderness in the calves  Skin:  no gross lesions, rashes, induration    Assessment:        Hospital Problems             Last Modified POA    * (Principal) NSTEMI (non-ST elevated myocardial infarction) ([facility]) 7/7/2024 Yes    Chronic respiratory failure with hypoxia ([facility]) 7/7/2024 Yes    Mixed hyperlipidemia 7/7/2024 Yes    Type 2 diabetes 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, with long-term current use of insulin ([facility]) 7/7/2024 Yes    Essential hypertension 7/7/2024 Yes    Anemia of chronic disease 7/7/2024 Yes    Chronic ischemic heart disease 7/7/2024 Yes    Morbid obesity with BMI of 40.0-44.9, adult ([facility]) 7/7/2024 Yes    Anxiety 7/7/2024 Yes    ESRD (end stage renal disease) on dialysis ([facility]) 7/7/2024 Yes    ERICK (obstructive sleep apnea) 7/7/2024 Yes       Plan:        Am treating as MI as no other definitive cause of elevated troponins above her baseline.  Already on statin, aspirin and effient.  Beta blocker not given when sbp<120 due to issues with hypotension on dialysis.  Will adjust this to be given on non-dialysis days unless very low bp.  This way she will get more beta blockade  Dc to snf; awaiting hanna Gill DO  7/10/2024  8:14 AM

## 2024-07-10 NOTE — PROGRESS NOTES
Occupational Therapy  Facility/Department: [facility]  Occupational Therapy Initial Assessment    Name: Estefany Garcia  : 1960  MRN: [id number]  Date of Service: 7/10/2024    DEDE Stinson/Yodit reports patient is medically stable for therapy treatment this date.    Chart reviewed prior to treatment and patient is agreeable for therapy.  All lines intact and patient positioned comfortably at end of treatment.  All patient needs addressed prior to ending therapy session.      Discharge Recommendations:  Patient would benefit from continued therapy after discharge  Pt currently functioning below baseline.  Recommend daily inpatient skilled therapy at time of discharge to maximize long term outcomes and prevent re-admission. Please refer to AM-PAC score for current level of function.  OT Equipment Recommendations  Equipment Needed: Yes  Mobility Devices: ADL Assistive Devices  ADL Assistive Devices: Emergency Alert System;Long-handled Shoe Horn;Long-handled Sponge;Reacher       Patient Diagnosis(es): The encounter diagnosis was Closed fracture of right wrist, initial encounter.  Past Medical History:  has a past medical history of Arthritis, Backache, unspecified, CAD (coronary artery disease), Cerebral artery occlusion with cerebral infarction (HCC), CHF (congestive heart failure) ([facility]), Chronic kidney disease, Coronary atherosclerosis of artery bypass graft, COVID, Cramp of limb, Epistaxis, Gallstones, Hemodialysis patient (HCC), Hyperlipidemia, Hypertension, Insomnia, Neuromuscular disorder (HCC), Pneumonia, Psychiatric problem, Thyroid disease, Type II or unspecified type diabetes mellitus with renal manifestations, not stated as uncontrolled(250.40), Type II or unspecified type diabetes mellitus without mention of complication, not stated as uncontrolled, and Unspecified vitamin D deficiency.  Past Surgical History:  has a past surgical history that includes Coronary artery bypass graft (2005); Knee  Setup;Moderate assistance  LE Bathing: Setup;Maximum assistance  UE Dressing: Setup;Moderate assistance  LE Dressing: Dependent/Total (for donning B socks while seated on EOB)  Toileting: Dependent/Total (ext cath)  Functional Mobility: Moderate assistance (x2 assistance)  Functional Mobility Skilled Clinical Factors: Pt completed functional mobility from bed to bedside chair with R sided platform walker. Pt given Mod verbal cues for pacing self, upright posture, initiation, pursed lip breathing, line mgmt, and RW safety all to increase safety and reduce risk of falls.  Additional Comments: Pts ADL performace is limited by decreased activity tolerance, R UE NWB precuation and body habitus.  Skin Care: N/A       Activity Tolerance  Activity Tolerance: Patient limited by endurance;Patient limited by fatigue  Activity Tolerance Comments: Mild dizziness/lightheadedness w/ position changes that would subside w/ time.  BPs all WNL.  RN notified.      Bed mobility  Supine to Sit: Minimal assistance  Sit to Supine:  (Not assessed this date.  Pt retired in bedside chair at end of session.)  Scooting: Minimal assistance  Bed Mobility Comments: Pt completed bed mobility with mild difficulites this date. Pt required increased time/effort to complete. Pt reporting mild lightheadedness once seated on EOB which subsided quickly.      Transfers  Sit to stand: Moderate assistance;2 Person assistance  Stand to sit: Moderate assistance;2 Person assistance  Transfer Comments: Pt completed bed mobility with difficulites this date. Pt required Mod verbal cues for pacing self, walker safety, pushing up from bed with L UE vs pulling on RW, reaching back to surface prior to sitting and controlled stand to sit all to increase safety.      Vision  Vision: Impaired  Vision Exceptions: Wears glasses for reading  Hearing  Hearing: Within functional limits      Cognition  Overall Cognitive Status: Exceptions  Following Commands: Follows multistep  commands with increased time  Attention Span: Attends with cues to redirect  Safety Judgement: Decreased awareness of need for safety;Decreased awareness of need for assistance  Problem Solving: Assistance required to correct errors made;Assistance required to identify errors made;Assistance required to implement solutions;Decreased awareness of errors;Assistance required to generate solutions  Insights: Decreased awareness of deficits  Initiation: Requires cues for some  Sequencing: Requires cues for some  Orientation  Overall Orientation Status: Within Functional Limits                  Education Given To: Patient  Education Provided: Role of Therapy;Transfer Training;Equipment;Energy Conservation;Plan of Care;Fall Prevention Strategies;Mobility Training;ADL Adaptive Strategies  Education Provided Comments: safety in function, fall prevention/call light use, recommendations for discharge/AE, benefits of being OOB, pursed lip breathing tech, role of OT in acute care, OT POC, platform walker use/safety  Education Method: Verbal  Barriers to Learning: None  Education Outcome: Verbalized understanding;Demonstrated understanding                          AM-PAC - ADL  AM-PAC Daily Activity - Inpatient   How much help is needed for putting on and taking off regular lower body clothing?: Total  How much help is needed for bathing (which includes washing, rinsing, drying)?: A Lot  How much help is needed for toileting (which includes using toilet, bedpan, or urinal)?: Total  How much help is needed for putting on and taking off regular upper body clothing?: A Little  How much help is needed for taking care of personal grooming?: A Little  How much help for eating meals?: None  AM-[facility] Inpatient Daily Activity Raw Score: 14  AM-PAC Inpatient ADL T-Scale Score : 33.39  ADL Inpatient CMS 0-100% Score: 59.67  ADL Inpatient CMS G-Code Modifier : CK         Goals  Short Term Goals  Time Frame for Short Term Goals: By discharge,

## 2024-07-10 NOTE — PROGRESS NOTES
HEMODIALYSIS POST TREATMENT NOTE    Treatment time ordered: 3.5hours    Actual treatment time: 3.5hiours    UltraFiltration Goal: 1500   UltraFiltration Removed: 1300      Pre Treatment weight: 114.2kgs   Post Treatment weight: 112.9kgs  Estimated Dry Weight: 113.5kgs    Access used:     Central Venous Catheter:          Tunneled or Non-tunneled: N/A           Site: N/A          Access Flow: N/A      Internal Access:       AV Fistula or AV Graft: AVF         Site: Left Upper Arm       Access Flow: Good       Sign and symptoms of infection: No       If YES: N/A    Medications or blood products given: See MAR    Chronic outpatient schedule: [facility]    Chronic outpatient unit: [facility]    Summary of response to treatment: Patient tolerated treatment fair. Patient was hypotensive throughout treatment and asymptomatic. Midodrine was administed. 1.3kgs of fluid removed. Needles pulled and sited clamped for 10 min per site. Bruit and thrill are still present.     Explain if orders NOT met, was physician notified:Yes      ACES flowsheet faxed to patient unit/ placed in patient chart: Yes    Post assessment completed: Ángel Delvalle    Report given to: Shantell AGUAYO      * Intra-treatment documented Safety Checks include the followin) Access and face visible at all times.     2) All connections and blood lines are secure with no kinks.     3) NVL alarm engaged.     4) Hemosafe device applied (if applicable).     5) No collapse of Arterial or Venous blood chambers.     6) All blood lines / pump segments in the air detectors.

## 2024-07-10 NOTE — PROGRESS NOTES
Physical Therapy  Facility/Department: [facility]  Physical Therapy Re-Assessment    Name: Estefany Garcia  : 1960  MRN: [id number]  Date of Service: 7/10/2024  DEDE Monsivais reports patient is medically stable for therapy treatment this date.    Chart reviewed prior to treatment and patient is agreeable for therapy.  All lines intact and patient positioned comfortably at end of treatment.  All patient needs addressed prior to ending therapy session.      Discharge Recommendations:  Patient would benefit from continued therapy after discharge   Pt currently functioning below baseline.  Recommend daily inpatient skilled therapy at time of discharge to maximize long term outcomes and prevent re-admission. Please refer to AM-PAC score for current level of function.    Per H&P: \"Estefany Garcia is a 64 y.o.  female with a history of type 2 diabetes, ESRD on HD, CAD s/p CABG and PCI(6 stents), CHF, ERICK who initially presented to [facility] ED from nursing facility after waking up with shortness of breath and chest pain and is admitted to the hospital for the management of NSTEMI (non-ST elevated myocardial infarction) (HCC).  Nursing staff gave her 2 sublingual nitros and symptoms had resolved by the time she had arrived to the ED.  Patient reports her family had signed her out for a \"day pass\" the day before for a holiday gathering where she spent most of the day in the heat.  She also reports she fell asleep without her CPAP last night.  ER physician reported no ischemic or acute changes on EKG  Initial troponin 374  Chest x-ray unremarkable for acute process.   Patient was started on a heparin gtt, cardiology and nephrology were consulted from [place] ED\"       Patient Diagnosis(es): The encounter diagnosis was Closed fracture of right wrist, initial encounter.  Past Medical History:  has a past medical history of Arthritis, Backache, unspecified, CAD (coronary artery disease), Cerebral artery  reporting mild lightheadedness throughout position changes which began to improve quickly.    Transfers  Sit to Stand: Moderate Assistance  Stand to Sit: Moderate Assistance  Bed to Chair: Moderate assistance;2 Person Assistance (poor safety w/ platform walker; shuffling feet; assist w/ lines)  Comment: Pt performed multiple STS transfers throughout session demonstrating fair steadiness throughout.  Pt initially reporting lightheadedness upon standing and requiring seated rest break.  BP seated at EOB WNL.  After ~4-5 minutes, agreeable to attempt second transfer.  Mod verbal cueing required for proper hand placement throughout transfers w/ platform walker w/ good return demo.  Fair eccentric quad control noted throughout stand>sit transfers w/ max verbal cueing.    Ambulation  Surface: Level tile  Device: Platform Walker right  Assistance: Minimal assistance;Moderate assistance;2 Person assistance  Quality of Gait: fair- steadiness, shuffling steps, forward flexed trunk  Gait Deviations: Slow Annie;Decreased step height;Decreased step length;Shuffles  Distance: 5ft  Comments: Pt demonstrating fair- steadiness throughout minimal ambulation this date.  Max verbal and tactile cueing required to maintain ELISABET within RW w/ poor return demo.  Assist required to navigate RW.  Shuffling steps noted throughout.  Assist required for all line mgmt this date.  More Ambulation?: No  Stairs/Curb  Stairs?: No       Balance  Posture: Poor (standing)  Sitting - Static: Good;-  Sitting - Dynamic: Fair;+  Standing - Static: Fair;-  Standing - Dynamic: Poor;+  Single Leg Stance R Le  Single Leg Stance L Le  Comments: Standing balance assessed w/ RW  Exercise Treatment: ankle pumps, LAQ, marches, posture correction, pursed lip breathing        AM-PAC - Mobility  AM-PAC Basic Mobility - Inpatient   AM-PAC Inpatient Mobility Raw Score : 11  AM-PAC Inpatient T-Scale Score : 33.86  Mobility Inpatient CMS 0-100% Score:

## 2024-07-10 NOTE — PLAN OF CARE
Problem: Discharge Planning  Goal: Discharge to home or other facility with appropriate resources  7/10/2024 0225 by Terri Saez RN  Outcome: Progressing  Flowsheets (Taken 7/10/2024 0225)  Discharge to home or other facility with appropriate resources:   Identify barriers to discharge with patient and caregiver   Arrange for needed discharge resources and transportation as appropriate   Identify discharge learning needs (meds, wound care, etc)   Refer to discharge planning if patient needs post-hospital services based on physician order or complex needs related to functional status, cognitive ability or social support system  7/9/2024 1850 by Tono Philip, RN  Outcome: Progressing     Problem: Chronic Conditions and Co-morbidities  Goal: Patient's chronic conditions and co-morbidity symptoms are monitored and maintained or improved  7/10/2024 0225 by Terri Saez RN  Outcome: Progressing  Flowsheets (Taken 7/10/2024 0225)  Care Plan - Patient's Chronic Conditions and Co-Morbidity Symptoms are Monitored and Maintained or Improved:   Monitor and assess patient's chronic conditions and comorbid symptoms for stability, deterioration, or improvement   Collaborate with multidisciplinary team to address chronic and comorbid conditions and prevent exacerbation or deterioration   Update acute care plan with appropriate goals if chronic or comorbid symptoms are exacerbated and prevent overall improvement and discharge  7/9/2024 1850 by Tono Philip RN  Outcome: Progressing     Problem: Safety - Adult  Goal: Free from fall injury  7/10/2024 0225 by Terri Saez, RN  Outcome: Progressing  Flowsheets (Taken 7/10/2024 0225)  Free From Fall Injury:   Instruct family/caregiver on patient safety   Based on caregiver fall risk screen, instruct family/caregiver to ask for assistance with transferring infant if caregiver noted to have fall risk factors  7/9/2024 1850 by Tono Philip, RN  Outcome:  Progressing     Problem: Skin/Tissue Integrity  Goal: Absence of new skin breakdown  Description: 1.  Monitor for areas of redness and/or skin breakdown  2.  Assess vascular access sites hourly  3.  Every 4-6 hours minimum:  Change oxygen saturation probe site  4.  Every 4-6 hours:  If on nasal continuous positive airway pressure, respiratory therapy assess nares and determine need for appliance change or resting period.  7/10/2024 0225 by Terri Saez RN  Outcome: Progressing  7/9/2024 1850 by Tono Philip RN  Outcome: Progressing     Problem: ABCDS Injury Assessment  Goal: Absence of physical injury  7/10/2024 0225 by Terri Saez RN  Outcome: Progressing  Flowsheets (Taken 7/10/2024 0225)  Absence of Physical Injury: Implement safety measures based on patient assessment  7/9/2024 1850 by Tono Philip RN  Outcome: Progressing     Problem: Pain  Goal: Verbalizes/displays adequate comfort level or baseline comfort level  7/10/2024 0225 by Terri Saez RN  Outcome: Progressing  Flowsheets (Taken 7/10/2024 0225)  Verbalizes/displays adequate comfort level or baseline comfort level:   Encourage patient to monitor pain and request assistance   Assess pain using appropriate pain scale   Administer analgesics based on type and severity of pain and evaluate response   Implement non-pharmacological measures as appropriate and evaluate response   Consider cultural and social influences on pain and pain management   Notify Licensed Independent Practitioner if interventions unsuccessful or patient reports new pain  7/9/2024 1850 by Tono Philip RN  Outcome: Progressing     Problem: Cardiovascular - Adult  Goal: Maintains optimal cardiac output and hemodynamic stability  7/10/2024 0225 by Terri Saez RN  Outcome: Progressing  Flowsheets (Taken 7/10/2024 0225)  Maintains optimal cardiac output and hemodynamic stability:   Monitor blood pressure and heart rate   Monitor urine output and notify

## 2024-07-10 NOTE — PROGRESS NOTES
Erythropoietin Stimulating Agents (LOLITA) Hold/Discontinue Protocol    The patient's hemoglobin was 10 on 7/10/24.    Pharmacy is only to dispense for Hgb < 10 g/dL, so the dose was held x1 per [facility] approved protocol.      The Retacrit order was discontinued and reentered to resume at the next scheduled dose. The pharmacist will review the Hbg again at that time.      If the next consecutive Hgb is also > 10 the pharmacist will contact the physician to discuss dose reduction/discontinuation  (this will not be held or discontinued automatically prior to prescriber contact).       Recent Labs     07/08/24  0658 07/09/24  0608 07/10/24  0741   HGB 9.9* 9.8* 10.0*       Sabra Valverde [facility]  7/10/2024  11:36 AM

## 2024-07-10 NOTE — PROGRESS NOTES
Nephrology Progress Note        Subjective: patient evaluated on dialysis. Pt is stable on dialysis. Access cannulated without problems. No new issues overnite. Stable hemodynamics.   Patient denies any nausea, vomiting or fever.  She does not give any history of chest pain at this time.      Objective:  CURRENT TEMPERATURE:  Temp: 97.6 °F (36.4 °C)  MAXIMUM TEMPERATURE OVER 24HRS:  Temp (24hrs), Av.4 °F (36.3 °C), Min:97.3 °F (36.3 °C), Max:97.6 °F (36.4 °C)    CURRENT RESPIRATORY RATE:  Respirations: 18  CURRENT PULSE:  Pulse: 74  CURRENT BLOOD PRESSURE:  BP: (!) 98/49  24HR BLOOD PRESSURE RANGE:  Systolic (24hrs), Av , Min:84 , Max:117   ; Diastolic (24hrs), Av, Min:44, Max:77    24HR INTAKE/OUTPUT:  No intake or output data in the 24 hours ending 07/10/24 1225  Patient Vitals for the past 96 hrs (Last 3 readings):   Weight   07/10/24 0857 117 kg (257 lb 15 oz)   07/10/24 0600 116.5 kg (256 lb 12.8 oz)   24 0400 116.9 kg (257 lb 11.5 oz)         Physical Exam:  General appearance:Awake, alert, in no acute distress  Skin: warm and dry, no rash or erythema  Eyes: conjunctivae normal and sclera anicteric  ENT:no thrush no pharyngeal congestion   Neck:  No JVD, No Thyromegaly  Pulmonary: clear to auscultation and no wheezing or rhonchi   Cardiovascular: normal S1 and S2. NO rubs and NO murmur. No S3 OR S4   Abdomen: soft nontender, bowel sounds present, no organomegaly,  no ascites   Extremities: She has a slight lower extremity edema     Access:  previous  Left AV fistula    Current Medications:    [START ON 2024] epoetin raphael-epbx (RETACRIT) injection 3,000 Units, Once per day on   polyethyl glycol-propyl glycol 0.4-0.3 % (SYSTANE) ophthalmic solution 1 drop, PRN  calcium carbonate (TUMS) chewable tablet 1,000 mg, TID PRN  sodium chloride flush 0.9 % injection 5-40 mL, 2 times per day  sodium chloride flush 0.9 % injection 10 mL, PRN  0.9 % sodium chloride infusion,  PRN  ondansetron (ZOFRAN-ODT) disintegrating tablet 4 mg, Q8H PRN   Or  ondansetron (ZOFRAN) injection 4 mg, Q6H PRN  acetaminophen (TYLENOL) tablet 650 mg, Q6H PRN   Or  acetaminophen (TYLENOL) suppository 650 mg, Q6H PRN  atorvastatin (LIPITOR) tablet 80 mg, Nightly  polyethylene glycol (GLYCOLAX) packet 17 g, Daily PRN  aspirin chewable tablet 81 mg, Daily  carvedilol (COREG) tablet 3.125 mg, BID  melatonin tablet 9 mg, Nightly PRN  midodrine (PROAMATINE) tablet 5 mg, PRN  bumetanide (BUMEX) tablet 3 mg, BID  insulin glargine (LANTUS) injection vial 72 Units, QAM  glucose chewable tablet 16 g, PRN  dextrose bolus 10% 125 mL, PRN   Or  dextrose bolus 10% 250 mL, PRN  glucagon injection 1 mg, PRN  dextrose 10 % infusion, Continuous PRN  sodium bicarbonate tablet 1,300 mg, BID  insulin lispro (HUMALOG,ADMELOG) injection vial 0-8 Units, TID WC  insulin lispro (HUMALOG,ADMELOG) injection vial 0-4 Units, Nightly  allopurinol (ZYLOPRIM) tablet 100 mg, Daily  docusate sodium (COLACE) capsule 100 mg, BID  busPIRone (BUSPAR) tablet 10 mg, TID  sevelamer (RENVELA) tablet 1,600 mg, TID WC  venlafaxine (EFFEXOR XR) extended release capsule 75 mg, Daily with breakfast  prasugrel (EFFIENT) tablet 10 mg, Daily  magnesium oxide (MAG-OX) tablet 400 mg, Daily  ferrous sulfate (FE TABS 325) EC tablet 325 mg, Daily  cyclobenzaprine (FLEXERIL) tablet 5 mg, TID PRN  insulin glargine (LANTUS) injection vial 42 Units, Nightly      Labs:   CBC:   Recent Labs     07/08/24  0658 07/09/24  0608 07/10/24  0741   WBC 6.0 5.9  --    RBC 2.73* 2.66*  --    HGB 9.9* 9.8* 10.0*   HCT 29.7* 29.7* 30.0*    139  --    MPV 10.8 10.9  --       BMP:   Recent Labs     07/08/24  0607 07/09/24  0608 07/10/24  0741    137 135   K 4.5 4.4 4.4    102 98   CO2 26 22 23   BUN 39* 25* 39*   CREATININE 5.3* 3.7* 4.7*   GLUCOSE 171* 242* 319*   CALCIUM 9.4 9.3 9.7        Magnesium:   Recent Labs     07/08/24  0607 07/09/24  0608   MG 2.2 2.2

## 2024-07-10 NOTE — PROGRESS NOTES
[facility]  Progress Note                   Date:   7/10/2024  Patient name: Estefany Garcia  Date of admission:  7/7/2024  9:25 AM  MRN:   [id number]  YOB: 1960  PCP: Zulma Payne APRN - CNP    Reason for Admission: NSTEMI (non-ST elevated myocardial infarction) (HCC) [I21.4]    Subjective:       Clinical Changes /Abnormalities: Stable overnight. No acute CV issues/concerns.  Denies CP or SOB. Had HD today without complications. States when she got back from HD and was getting OOB into chair she had some lightheadedness so she sat back down and rested before getting back up. Now resolved.     Review of Systems    Medications:   Scheduled Meds:   [START ON 7/12/2024] epoetin raphael-epbx  3,000 Units SubCUTAneous Once per day on Mon Wed Fri    sodium chloride flush  5-40 mL IntraVENous 2 times per day    atorvastatin  80 mg Oral Nightly    aspirin  81 mg Oral Daily    carvedilol  3.125 mg Oral BID    bumetanide  3 mg Oral BID    insulin glargine  72 Units SubCUTAneous QAM    sodium bicarbonate  1,300 mg Oral BID    insulin lispro  0-8 Units SubCUTAneous TID WC    insulin lispro  0-4 Units SubCUTAneous Nightly    allopurinol  100 mg Oral Daily    docusate sodium  100 mg Oral BID    busPIRone  10 mg Oral TID    sevelamer  1,600 mg Oral TID WC    venlafaxine  75 mg Oral Daily with breakfast    prasugrel  10 mg Oral Daily    magnesium oxide  400 mg Oral Daily    ferrous sulfate  325 mg Oral Daily    insulin glargine  42 Units SubCUTAneous Nightly     Continuous Infusions:   sodium chloride      dextrose       CBC:   Recent Labs     07/08/24  0658 07/09/24  0608 07/10/24  0741   WBC 6.0 5.9  --    HGB 9.9* 9.8* 10.0*    139  --        BMP:    Recent Labs     07/08/24  0607 07/09/24  0608 07/10/24  0741    137 135   K 4.5 4.4 4.4    102 98   CO2 26 22 23   BUN 39* 25* 39*   CREATININE 5.3* 3.7* 4.7*   GLUCOSE 171* 242* 319*       Hepatic:No results for input(s): \"AST\",  \"ALT\", \"BILITOT\", \"ALKPHOS\" in the last 72 hours.    Invalid input(s): \"ALB\"  Troponin:   Recent Labs     07/08/24  0607 07/08/24  0658 07/09/24  0608   TROPHS 485* 471* 436*       BNP: No results for input(s): \"BNP\" in the last 72 hours.  Lipids:   No results for input(s): \"CHOL\", \"HDL\" in the last 72 hours.    Invalid input(s): \"LDLCALCU\"    INR:   No results for input(s): \"INR\" in the last 72 hours.      Objective:   Vitals: BP (!) 95/44   Pulse 75   Temp 97.3 °F (36.3 °C)   Resp 18   Ht 1.651 m (5' 5\")   Wt 112.9 kg (248 lb 14.4 oz)   SpO2 91%   BMI 41.42 kg/m²   General appearance: alert and cooperative with exam  HEENT: Head: Normocephalic, no lesions, without obvious abnormality.  Neck:no JVD, trachea midline, no adenopathy  Lungs: Clear to auscultation, dim throughout. No distress on NC  Heart: Regular rate and rhythm, s1/s2 auscultated, no murmurs  Abdomen: soft, non-tender, bowel sounds active  Extremities: no edema  Neurologic: not done    Stress test 2/26/24  IMPRESSION:     [Large infarct with stalin-infarct ischemia in the apex, lateral and inferior wall]     Normal LVEF      Risk stratification: [Low risk.]     Author: Adrián Jaquez MD      Cardiac cath 2/28/24  Severe native vessel CAD.   Patent LIMA-LAD. Patent SVG-Diagonal with patent stent.   RCA 70% mid ISR reduced to 0% using high pressure 3 mm NC balloon PTCA. RPDA has 95% ISR reduced to 0% using high pressure 2.25 NC balloon PTCA.      Recommendations     DAPT and risk factor modifications.        ECHO 2/2/24    Left Ventricle: Normal left ventricular systolic function with a visually estimated EF of 55 - 60%. Left ventricle size is normal. Mild septal thickening. Normal wall motion. Abnormal diastolic function. Average E/e' ratio is 17.97.    Aorta: Normal sized ascending aorta. Dilated aortic root. Ao root diameter is 2.9 cm.    Image quality is fair.    Assessment / Acute Cardiac Problems:   Brief episode of focal, atypical chest  discomfort after awakening SOB off CPAP; no evidence of acute coronary syndrome  Stable CAD, h/o CABG with catheterization Feb 2024 showing patent bypass grafts with mid RCA and RPDA in-stent restenosis successfully treated with angioplasty  Essential HTN with preserved LVEF; no CHF on exam  Type II DM  ESRD on HD  ERICK, using CPAP    Patient Active Problem List:     Atherosclerosis of coronary artery bypass graft of native heart with angina pectoris ([facility]ly [facility])     Acute kidney injury superimposed on CKD ([facility])     Chronic diastolic heart failure ([facility])     Diabetic polyneuropathy associated with type 2 diabetes mellitus ([facility])     History of coronary artery bypass graft     Iron deficiency anemia     Spinal stenosis of lumbar region with neurogenic claudication     Mixed hyperlipidemia     CKD (chronic kidney disease) stage 4, GFR 15-29 ml/min ([facility])     Type 2 diabetes 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, with long-term current use of insulin ([facility]ly [facility])     Obesity, Class II, BMI 35-39.9     Thyroid nodule greater than or equal to 1 cm in diameter incidentally noted on imaging study     Essential hypertension     Anemia of chronic disease     Chronic ischemic heart disease     Ischemic stroke of frontal lobe ([facility])     Morbid obesity with BMI of 40.0-44.9, adult ([facility]ly [facility])     Disequilibrium syndrome     Anxiety     Chronic midline low back pain with bilateral sciatica     Acute thoracic back pain     COVID     Delirium due to another medical condition     Cerebral hypoperfusion     Immature arteriovenous fistula ([facility])     History of fusion of cervical spine     Depression with anxiety     Vancomycin resistant Enterococcus UTI     ESRD on hemodialysis ([facility])     Dialysis disequilibrium syndrome     Acute cystitis without hematuria     VRE infection (vancomycin resistant Enterococcus)     Infection due to ESBL-producing Klebsiella pneumoniae     Class 2 severe obesity due to excess calories with serious comorbidity

## 2024-07-10 NOTE — Clinical Note
PLEASE ADHERE TO CATH RESTRICTIONS AS LISTED    CATH RECOVERY RESTRICTION SUMMARY:    NO LIFTING ANYTHING OVER 10 LBS. X 2 WEEKS.    NO SOAKING IN ANY SWIMMING POOLS, HOT TUBS OR BATHTUBS X 2 WEEKS.    NO DRIVING FOR 3 DAYS.    PLEASE DISCUSS WITH MD ANY ISSUES OR REQUIRED WORK NOTES OR RESTRICTIONS AS WELL AND FOLLOWUP AS ADVISED BY MD.     PLEASE KEEP AREA CLEAN AND DRY.    OK TO REMOVE DRESSING 24 HRS AFTER CATH.    WHEN SHOWERING, PAT AREA DRY - DO NOT RUB!!!    PLEASE WATCH FOR S/S OF INFECTION. (WARM, RED, SWOLLEN, YOU DEVELOP A FEVER) IF ANY ISSUES PLEASE CALL MD OR COME TO LOCAL ER.       Discharge Instructions Following Heart Catheterization or Intervention    After the Procedure  The effects of the medication or sedation that were used before and/or during your procedure can last for up to 24 hours. Due to the medication or sedation in your system you should not:  Drive a car, operate machinery or power tools  Drink alcoholic beverages  Make important decisions that might be affected by your judgment    Following your procedure, rest at home for the remainder of the day. Do not exercise and do not lift heavy objects greater than 10 pounds.    Days after the Procedure  Resume your low-fat, low-cholesterol diet upon discharge from the hospital.  Instructions for activities are as follows:    FEMORAL ACCESS (CATH PERFORMED THROUGH THE GROIN):   (RN to check procedure that applies)   _____ Cardiac cath PROCEDURE ONLY:  Do not drive for three days  Do not bed, push, or pull more than 10lbs for one week  Do not get your heart rate up via exercise for at least one week or as indicated by your physician  Do not return to work for three days or as indicated by your physician  _____ Interventional Procedure (Cath with stent or angioplasty):  Do not drive for two weeks  Do not bed, push, or pull more than 10lbs for two weeks  Do not get heart rate up via exercise for at least 2 weeks or as indicated by your  This test is functionally performing a very high quality stress test for a short segment of the artery.         Commonly Asked Questions:    Are these procedures considered to be surgical procedures?  No. Cardiac catheterization and interventional procedures are not considered to be surgical procedures because there is no large incision used to open the chest, and the recovery time from catheterization is much shorter than that of surgery.    Will I be awake during the procedure?  Yes. You will be given a mild sedative to relax you, but you will be awake and conscious during the entire procedure. The doctor will use a local anesthetic to numb the catheter insertion site.    Where are the procedures performed?  The catheterization and interventional procedures are performed in the hospital and in the cardiac catheterization lab at [facility].    Who performs the procedures?  Cardiac catheterization and interventional procedures are performed by specially-trained cardiovascular invasive physician and a cardiovascular team of cardiology fellows, nurses, and technicians.    How long do the procedures take to perform?  The cardiac catheterization procedure itself generally takes 30 minutes, but the preparation and recovery time add several hours to your appointment time (5-9 hours or longer). Please plan on staying at the hospital all day for the procedure.    An interventional procedure usually takes 90 to 120 minutes, but the preparation and recovery time add several hours. If you had previous coronary artery bypass graft (CABG) surgery, you can expect your interventional procedure to last longer. Please plan on staying at the hospital all day for the procedure and remaining in the hospital overnight.     What are the possible risks of the procedures?  If you need to have a cardiac catheterization or an interventional procedure, your cardiologist will discuss the specific risks and potential benefits of the  recommended procedure with you.    Some of the possible risks of cardiac catheterization and interventional procedures include:  Allergic reaction to the medication or contrast material used during the procedure  Irregular heart rhythm  Infection  Bleeding at the catheter site  Chest pain or angina  Heart attack, blood clots, stroke, or death  Acute closure of coronary artery  Emergency coronary artery bypass graft (CABG) surgery

## 2024-07-10 NOTE — PROGRESS NOTES
End Of Shift Note  [facility]  Summary of shift: Ordered Tums for reflux, administered pain medication for right wrist/arm. Requested glycolax. Bathed. No other complaints overnight.     Vitals:    Vitals:    07/10/24 0000 07/10/24 0250 07/10/24 0400 07/10/24 0600   BP: (!) 117/47  104/66    Pulse: 78 87 84    Resp: 16      Temp: 97.5 °F (36.4 °C)      TempSrc: Temporal      SpO2: 95% 91%     Weight:    116.5 kg (256 lb 12.8 oz)   Height:            I&O:   Intake/Output Summary (Last 24 hours) at 7/10/2024 0649  Last data filed at 7/9/2024 1204  Gross per 24 hour   Intake --   Output 200 ml   Net -200 ml       Resp Status: 2LNC    Ventilator Settings:     / / /     Critical Care IV infusions:   sodium chloride      dextrose          LDA:   Peripheral IV 07/07/24 Right Antecubital (Active)   Number of days: 2       External Urinary Catheter (Active)   Number of days: 2       Hemodialysis Fistula/Graft Left Forearm (Active)   Number of days:        Hemodialysis Fistula/Graft Left Arm (Active)   Number of days: 764       Hemodialysis Fistula/Graft Arteriovenous fistula Left Forearm (Active)   Number of days:        Wound 06/09/24 Buttocks Left;Right (Active)   Number of days: 30       Wound 07/07/24 Radial Right (Active)   Number of days: 2

## 2024-07-10 NOTE — PROGRESS NOTES
HEMODIALYSIS PRE-TREATMENT NOTE    Patient Identifiers prior to treatment: Name  MRN    Isolation Required: Yes                      Isolation Type: Contact       (please document if patient is being managed as a PUI/COVID-19 patient)        Hepatitis status:                           Date Drawn                             Result  Hepatitis B Surface Antigen 2024     NEG                     Hepatitis B Surface Antibody 2024 POS     1000   Hepatitis B Core Antibody 2024 NEG          How was Hepatitis Status verified: [facility] Chart     Was a copy of the labs you documented provided to facility for the patient's chart: Yes    Hemodialysis orders verified: Yes by Ángel Delvalle    Access Within normal limits ( I.e. s/s of infection,...): WNL     Pre-Assessment completed: Yes by Ángel Delvalle    Pre-dialysis report received from: Shantell AGUAYO                      Time: 830

## 2024-07-11 LAB
EKG ATRIAL RATE: 86 BPM
EKG P AXIS: 62 DEGREES
EKG P-R INTERVAL: 168 MS
EKG Q-T INTERVAL: 406 MS
EKG QRS DURATION: 100 MS
EKG QTC CALCULATION (BAZETT): 485 MS
EKG R AXIS: 42 DEGREES
EKG T AXIS: -159 DEGREES
EKG VENTRICULAR RATE: 86 BPM

## 2024-07-11 PROCEDURE — 6370000000 HC RX 637 (ALT 250 FOR IP): Performed by: NURSE PRACTITIONER

## 2024-07-11 PROCEDURE — 6370000000 HC RX 637 (ALT 250 FOR IP): Performed by: INTERNAL MEDICINE

## 2024-07-11 PROCEDURE — 97530 THERAPEUTIC ACTIVITIES: CPT

## 2024-07-11 PROCEDURE — 99231 SBSQ HOSP IP/OBS SF/LOW 25: CPT | Performed by: INTERNAL MEDICINE

## 2024-07-11 PROCEDURE — 2700000000 HC OXYGEN THERAPY PER DAY

## 2024-07-11 PROCEDURE — 94761 N-INVAS EAR/PLS OXIMETRY MLT: CPT

## 2024-07-11 PROCEDURE — 97535 SELF CARE MNGMENT TRAINING: CPT

## 2024-07-11 PROCEDURE — 97116 GAIT TRAINING THERAPY: CPT

## 2024-07-11 PROCEDURE — 99233 SBSQ HOSP IP/OBS HIGH 50: CPT | Performed by: NURSE PRACTITIONER

## 2024-07-11 PROCEDURE — 97110 THERAPEUTIC EXERCISES: CPT

## 2024-07-11 PROCEDURE — 2060000000 HC ICU INTERMEDIATE R&B

## 2024-07-11 RX ORDER — ACETAMINOPHEN 650 MG/1
650 SUPPOSITORY RECTAL EVERY 6 HOURS PRN
Status: DISCONTINUED | OUTPATIENT
Start: 2024-07-11 | End: 2024-07-12 | Stop reason: HOSPADM

## 2024-07-11 RX ORDER — TRAMADOL HYDROCHLORIDE 50 MG/1
50 TABLET ORAL EVERY 4 HOURS PRN
Status: DISCONTINUED | OUTPATIENT
Start: 2024-07-11 | End: 2024-07-12 | Stop reason: HOSPADM

## 2024-07-11 RX ORDER — ACETAMINOPHEN 325 MG/1
650 TABLET ORAL EVERY 6 HOURS PRN
Status: DISCONTINUED | OUTPATIENT
Start: 2024-07-11 | End: 2024-07-12 | Stop reason: HOSPADM

## 2024-07-11 RX ADMIN — CARVEDILOL 3.12 MG: 3.12 TABLET, FILM COATED ORAL at 22:15

## 2024-07-11 RX ADMIN — BUMETANIDE 3 MG: 1 TABLET ORAL at 22:15

## 2024-07-11 RX ADMIN — VENLAFAXINE HYDROCHLORIDE 75 MG: 75 CAPSULE, EXTENDED RELEASE ORAL at 09:08

## 2024-07-11 RX ADMIN — PRASUGREL 10 MG: 10 TABLET, FILM COATED ORAL at 09:08

## 2024-07-11 RX ADMIN — SODIUM BICARBONATE 1300 MG: 650 TABLET ORAL at 22:15

## 2024-07-11 RX ADMIN — FERROUS SULFATE TAB EC 325 MG (65 MG FE EQUIVALENT) 325 MG: 325 (65 FE) TABLET DELAYED RESPONSE at 09:08

## 2024-07-11 RX ADMIN — INSULIN GLARGINE 72 UNITS: 100 INJECTION, SOLUTION SUBCUTANEOUS at 09:07

## 2024-07-11 RX ADMIN — DOCUSATE SODIUM 100 MG: 100 CAPSULE, LIQUID FILLED ORAL at 22:15

## 2024-07-11 RX ADMIN — DOCUSATE SODIUM 100 MG: 100 CAPSULE, LIQUID FILLED ORAL at 09:07

## 2024-07-11 RX ADMIN — SEVELAMER CARBONATE 1600 MG: 800 TABLET, FILM COATED ORAL at 12:46

## 2024-07-11 RX ADMIN — ATORVASTATIN CALCIUM 80 MG: 80 TABLET, FILM COATED ORAL at 22:15

## 2024-07-11 RX ADMIN — BUSPIRONE HYDROCHLORIDE 10 MG: 10 TABLET ORAL at 22:15

## 2024-07-11 RX ADMIN — INSULIN GLARGINE 42 UNITS: 100 INJECTION, SOLUTION SUBCUTANEOUS at 21:57

## 2024-07-11 RX ADMIN — INSULIN LISPRO 2 UNITS: 100 INJECTION, SOLUTION INTRAVENOUS; SUBCUTANEOUS at 17:42

## 2024-07-11 RX ADMIN — SEVELAMER CARBONATE 1600 MG: 800 TABLET, FILM COATED ORAL at 09:09

## 2024-07-11 RX ADMIN — INSULIN LISPRO 2 UNITS: 100 INJECTION, SOLUTION INTRAVENOUS; SUBCUTANEOUS at 09:06

## 2024-07-11 RX ADMIN — Medication 9 MG: at 22:15

## 2024-07-11 RX ADMIN — INSULIN LISPRO 4 UNITS: 100 INJECTION, SOLUTION INTRAVENOUS; SUBCUTANEOUS at 12:46

## 2024-07-11 RX ADMIN — CYCLOBENZAPRINE HYDROCHLORIDE 5 MG: 5 TABLET, FILM COATED ORAL at 21:55

## 2024-07-11 RX ADMIN — SEVELAMER CARBONATE 1600 MG: 800 TABLET, FILM COATED ORAL at 17:41

## 2024-07-11 RX ADMIN — BUSPIRONE HYDROCHLORIDE 10 MG: 10 TABLET ORAL at 09:08

## 2024-07-11 RX ADMIN — BUMETANIDE 3 MG: 1 TABLET ORAL at 09:09

## 2024-07-11 RX ADMIN — ALLOPURINOL 100 MG: 100 TABLET ORAL at 09:09

## 2024-07-11 RX ADMIN — ASPIRIN 81 MG CHEWABLE TABLET 81 MG: 81 TABLET CHEWABLE at 09:07

## 2024-07-11 RX ADMIN — BUSPIRONE HYDROCHLORIDE 10 MG: 10 TABLET ORAL at 17:42

## 2024-07-11 RX ADMIN — Medication 400 MG: at 22:15

## 2024-07-11 RX ADMIN — SODIUM BICARBONATE 1300 MG: 650 TABLET ORAL at 09:08

## 2024-07-11 RX ADMIN — CARVEDILOL 3.12 MG: 3.12 TABLET, FILM COATED ORAL at 09:08

## 2024-07-11 NOTE — PROGRESS NOTES
Occupational Therapy  Facility/Department: [facility]  Rehabilitation Occupational Therapy Daily Treatment Note    Date: 24  Patient Name: Estefany Garcia       Room:   MRN: [id number]  Account: [id number]   : 1960  (64 y.o.) Gender: female   Past Medical History:  has a past medical history of Arthritis, Backache, unspecified, CAD (coronary artery disease), Cerebral artery occlusion with cerebral infarction (HCC), CHF (congestive heart failure) ([facility]), Chronic kidney disease, Coronary atherosclerosis of artery bypass graft, COVID, Cramp of limb, Epistaxis, Gallstones, Hemodialysis patient ([facility]), Hyperlipidemia, Hypertension, Insomnia, Neuromuscular disorder ([facility]), Pneumonia, Psychiatric problem, Thyroid disease, Type II or unspecified type diabetes mellitus with renal manifestations, not stated as uncontrolled(250.40), Type II or unspecified type diabetes mellitus without mention of complication, not stated as uncontrolled, and Unspecified vitamin D deficiency.  Past Surgical History:   has a past surgical history that includes Coronary artery bypass graft (2005); Knee arthroscopy; Carpal tunnel release; Breast surgery; Tonsillectomy; Hand surgery; Ankle fracture surgery; Cholecystectomy, open (N/A); IR TUNNELED CVC PLACE WO SQ PORT/PUMP > 5 YEARS (2021); AV fistula creation (2021); Dialysis fistula creation (Left, 2021); other surgical history (2022); back surgery; Colonoscopy; eye surgery; fracture surgery; Cardiac surgery; IR TUNNELED CVC PLACE WO SQ PORT/PUMP > 5 YEARS (2023); IR TUNNELED CVC PLACE WO SQ PORT/PUMP > 5 YEARS (Right, 2023); Dialysis Catheter Insertion (Right, 2023); other surgical history (Left, 2023); Coronary angioplasty with stent (2023); Cardiac catheterization (); Coronary angioplasty with stent (); Dialysis fistula creation (Left, 2023); IR TUNNELED CVC PLACE WO SQ PORT/PUMP > 5 YEARS

## 2024-07-11 NOTE — PROGRESS NOTES
[facility]  Office: [phone]  Laith Shirley DO, Eric Nicholson, DO, Madhu Rowland DO, Sergio Gill, DO, Dinh Graham MD, Mirela Knowles MD, Wilmer Francois MD, Nancy Bullock MD,  Shaw Correa MD, Heladio Sanchez MD, Genaro Hoyt MD,  Gini Humphrey DO, Franklin Anthony MD, Ang Jorgensen MD, Van Shirley DO, Richa Godinez MD,  Rakesh Curtis DO, Quiana Soares MD, Liset Reyez MD, Irlanda Knight MD, Anders Call MD,  Anirudh Dias MD, Charissa Driver MD, John Bell MD, Donnie Mancilla MD, Yinka Caputo MD, Ehsan Patel MD, Marcelino De La Vega DO, Benny Estrada DO, Jenny Hawley MD,  Bao Torres MD, Shirley Waterhouse, CNP,  Christine Boss CNP, Adrián Smyth, CNP,  Estefany tSallings, ARIEL, Viji Velazquez, CNP, Carla Rae, CNP, Oneyda Marsh CNP, Danielle Gamble, CNP, Katherin Brewer, PA-C, Sarita Prakash PA-C, Renuka Alba, CNP, Regine Snow, CNP, Mesha Singh, CNP, Megan Carpio, CNP, Libby Massey CNP, Pat Lombardo, CNS, Karla Gutiérrez, CNP, Ami Laguerre CNP, Tracy Schwab, CNP         [facility]   IN-PATIENT SERVICE   [facility]    Progress Note    7/11/2024    11:36 AM    Name:   Estefany Garcia  MRN:     [id number]     Acct:      [id number]   Room:   2042/2042-01  IP Day:  4  Admit Date:  7/7/2024  9:25 AM    PCP:   Zulma Payne APRN - CNP  Code Status:  Full Code    Subjective:     C/C: cp  Interval History Status: improved.     No further cp  No sob/n/v    Had a BM yesterday    Was able to butter her toast today for the 1st time since injuring her arm, but this was still quite difficult    Brief History:     Per my partner:  \"This is a 64-year-old female with a history of end-stage renal disease, coronary disease and congestive heart failure that presents to [facility] as a transfer from [facility] with a complaint of chest pain and shortness of breath and findings of elevated troponins. Discomfort non-STEMI and    Intake 500 ml   Output 2150 ml   Net -1650 ml       Labs:  Hematology:  Recent Labs     07/09/24  0608 07/10/24  0741   WBC 5.9  --    RBC 2.66*  --    HGB 9.8* 10.0*   HCT 29.7* 30.0*   .7*  --    MCH 36.8*  --    MCHC 33.0  --    RDW 16.3*  --      --    MPV 10.9  --      Chemistry:  Recent Labs     07/09/24  0608 07/10/24  0741    135   K 4.4 4.4    98   CO2 22 23   GLUCOSE 242* 319*   BUN 25* 39*   CREATININE 3.7* 4.7*   MG 2.2  --    ANIONGAP 13 14   LABGLOM 13* 10*   CALCIUM 9.3 9.7   TROPHS 436*  --      Recent Labs     07/08/24 2007 07/09/24  0008 07/09/24  0806 07/09/24  1937 07/10/24  0757 07/10/24  1208   POCGLU 247* 211* 235* 311* 249* 157*     ABG:  Lab Results   Component Value Date/Time    PHART 7.429 04/24/2022 01:42 PM    RYY6ULO 45.9 04/24/2022 01:42 PM    PO2ART 47.6 04/24/2022 01:42 PM    XAN9NIG 30.4 04/24/2022 01:42 PM    PBEA 6.1 04/24/2022 01:42 PM    I1PLOLEL 82.8 04/24/2022 01:42 PM    FIO2 INFORMATION NOT PROVIDED 01/25/2023 10:59 AM     Lab Results   Component Value Date/Time    SPECIAL UDN011 02/25/2023 03:15 AM     Lab Results   Component Value Date/Time    CULTURE NO SIGNIFICANT GROWTH 06/11/2024 12:00 PM       Radiology:  XR CHEST PORTABLE    Result Date: 7/7/2024  1. No acute cardiopulmonary process. 2. Mild cardiomegaly.       Physical Examination:        General appearance:  alert, cooperative and no distress  Mental Status:  oriented to person, place and time and normal affect  Lungs:  clear to auscultation bilaterally, normal effort  Heart:  regular rate and rhythm, no murmur  Abdomen:  soft, nontender, nondistended, normal bowel sounds, no masses, hepatomegaly, splenomegaly; obese  Extremities:  no edema, redness, tenderness in the calves  Skin:  no gross lesions, rashes, induration    Assessment:        Hospital Problems             Last Modified POA    * (Principal) NSTEMI (non-ST elevated myocardial infarction) ([facility]) 7/7/2024 Yes    Chronic

## 2024-07-11 NOTE — PROGRESS NOTES
Subjective:   patient evaluated . Pt received dialysis yesterday .  No Access problems reported . No new issues overnite. Stable hemodynamics.   Patient is awaiting placement.  She is without any acute symptoms at this time.  Patient denied nausea, vomiting, fever, shortness of breath.  Hemodialysis yesterday was relatively uneventful.    Objective:  CURRENT TEMPERATURE:  Temp: 97.7 °F (36.5 °C)  MAXIMUM TEMPERATURE OVER 24HRS:  Temp (24hrs), Av.5 °F (36.4 °C), Min:97.3 °F (36.3 °C), Max:97.7 °F (36.5 °C)    CURRENT RESPIRATORY RATE:  Respirations: 18  CURRENT PULSE:  Pulse: 78  CURRENT BLOOD PRESSURE:  BP: 114/71  24HR BLOOD PRESSURE RANGE:  Systolic (24hrs), Av , Min:84 , Max:120   ; Diastolic (24hrs), Av, Min:36, Max:77    24HR INTAKE/OUTPUT:    Intake/Output Summary (Last 24 hours) at 2024 0951  Last data filed at 2024 0400  Gross per 24 hour   Intake 500 ml   Output 2150 ml   Net -1650 ml     Patient Vitals for the past 96 hrs (Last 3 readings):   Weight   24 0400 111 kg (244 lb 11.4 oz)   07/10/24 1250 112.9 kg (248 lb 14.4 oz)   07/10/24 0857 114.2 kg (251 lb 12.3 oz)         Physical Exam:  General appearance:Awake, alert, in no acute distress  Skin: warm and dry, no rash or erythema  Eyes: conjunctivae normal and sclera anicteric  ENT:no thrush no pharyngeal congestion   Neck:  No JVD, No Thyromegaly  Pulmonary: clear to auscultation and no wheezing or rhonchi   Cardiovascular: normal S1 and S2. NO rubs and NO murmur. No S3 OR S4   Abdomen: soft nontender, bowel sounds present, no organomegaly,  no ascites   Extremities: + Slight edema     Access:  previous  Left AV fistula    Current Medications:    [START ON 2024] epoetin raphael-epbx (RETACRIT) injection 3,000 Units, Once per day on   traMADol (ULTRAM) tablet 50 mg, Q4H PRN  polyethyl glycol-propyl glycol 0.4-0.3 % (SYSTANE) ophthalmic solution 1 drop, PRN  calcium carbonate (TUMS) chewable tablet 1,000 mg, TID  PRN  sodium chloride flush 0.9 % injection 5-40 mL, 2 times per day  sodium chloride flush 0.9 % injection 10 mL, PRN  0.9 % sodium chloride infusion, PRN  ondansetron (ZOFRAN-ODT) disintegrating tablet 4 mg, Q8H PRN   Or  ondansetron (ZOFRAN) injection 4 mg, Q6H PRN  acetaminophen (TYLENOL) tablet 650 mg, Q6H PRN   Or  acetaminophen (TYLENOL) suppository 650 mg, Q6H PRN  atorvastatin (LIPITOR) tablet 80 mg, Nightly  polyethylene glycol (GLYCOLAX) packet 17 g, Daily PRN  aspirin chewable tablet 81 mg, Daily  carvedilol (COREG) tablet 3.125 mg, BID  melatonin tablet 9 mg, Nightly PRN  midodrine (PROAMATINE) tablet 5 mg, PRN  bumetanide (BUMEX) tablet 3 mg, BID  insulin glargine (LANTUS) injection vial 72 Units, QAM  glucose chewable tablet 16 g, PRN  dextrose bolus 10% 125 mL, PRN   Or  dextrose bolus 10% 250 mL, PRN  glucagon injection 1 mg, PRN  dextrose 10 % infusion, Continuous PRN  sodium bicarbonate tablet 1,300 mg, BID  insulin lispro (HUMALOG,ADMELOG) injection vial 0-8 Units, TID WC  insulin lispro (HUMALOG,ADMELOG) injection vial 0-4 Units, Nightly  allopurinol (ZYLOPRIM) tablet 100 mg, Daily  docusate sodium (COLACE) capsule 100 mg, BID  busPIRone (BUSPAR) tablet 10 mg, TID  sevelamer (RENVELA) tablet 1,600 mg, TID WC  venlafaxine (EFFEXOR XR) extended release capsule 75 mg, Daily with breakfast  prasugrel (EFFIENT) tablet 10 mg, Daily  magnesium oxide (MAG-OX) tablet 400 mg, Daily  ferrous sulfate (FE TABS 325) EC tablet 325 mg, Daily  cyclobenzaprine (FLEXERIL) tablet 5 mg, TID PRN  insulin glargine (LANTUS) injection vial 42 Units, Nightly      Labs:   CBC:   Recent Labs     07/09/24  0608 07/10/24  0741   WBC 5.9  --    RBC 2.66*  --    HGB 9.8* 10.0*   HCT 29.7* 30.0*     --    MPV 10.9  --       BMP:   Recent Labs     07/09/24  0608 07/10/24  0741    135   K 4.4 4.4    98   CO2 22 23   BUN 25* 39*   CREATININE 3.7* 4.7*   GLUCOSE 242* 319*   CALCIUM 9.3 9.7         Magnesium:   Recent Labs     07/09/24  0608   MG 2.2       Assessment:    1 ESRD:dialysis [facility].  Patient received hemodialysis yesterday.  2.1 L removed.  2.admitted with chest pain, likely atypical, cardiology following.  No interventions from their standpoint reading through their note.  3 history of essential hypertension, blood pressures are okay:  4 history of coronary artery disease, had a cardiac cath in February of this year with couple stents.  WEEMS to LAD was patent and SVG to diagonal was patent as well.  5.  Diabetes mellitus type 2    Plan:  1.  Continue current meds   2.  Okay from my side for discharge anytime  3.  If she is still hospitalized, will need dialysis.   4. Following       Please do not hesitate to call with questions.    Electronically signed by Dionte Estrada MD on 7/11/2024 at 9:51 AM

## 2024-07-11 NOTE — ADT AUTH CERT
Progress Notes by Libby Massey APRN - CNP at 7/11/2024 10:16 AM    Author: Libby Massey APRN - CNP Service: Cardiology Author Type: Nurse Practitioner   Filed: 7/11/2024 10:17 AM Date of Service: 7/11/2024 10:16 AM Status: Signed   : Libby Massey APRN - CNP (Nurse Practitioner)   Expand All Collapse All    [facility]  Progress Note                     Date:                            7/11/2024  Patient name:  Estefany Garcia  Date of admission:      7/7/2024  9:25 AM  MRN:                           [id number]  YOB: 1960  PCP: Zulma Payne APRN - CNP     Reason for Admission: NSTEMI (non-ST elevated myocardial infarction) ([facility]) [I21.4]     Subjective:                             Clinical Changes /Abnormalities: Pt seen and examined in the room.  Patient resting in bed. Pt denies any CP or sob.  Labs, vitals and tele reviewed     Medications:   Scheduled Meds:  Scheduled Medications    [START ON 7/12/2024] epoetin raphael-epbx  3,000 Units SubCUTAneous Once per day on Mon Wed Fri    sodium chloride flush  5-40 mL IntraVENous 2 times per day    atorvastatin  80 mg Oral Nightly    aspirin  81 mg Oral Daily    carvedilol  3.125 mg Oral BID    bumetanide  3 mg Oral BID    insulin glargine  72 Units SubCUTAneous QAM    sodium bicarbonate  1,300 mg Oral BID    insulin lispro  0-8 Units SubCUTAneous TID WC    insulin lispro  0-4 Units SubCUTAneous Nightly    allopurinol  100 mg Oral Daily    docusate sodium  100 mg Oral BID    busPIRone  10 mg Oral TID    sevelamer  1,600 mg Oral TID WC    venlafaxine  75 mg Oral Daily with breakfast    prasugrel  10 mg Oral Daily    magnesium oxide  400 mg Oral Daily    ferrous sulfate  325 mg Oral Daily    insulin glargine  42 Units SubCUTAneous Nightly         Continuous Infusions:  Infusions Meds    sodium chloride      dextrose           CBC:        Recent Labs     07/09/24  0608 07/10/24  0741   WBC 5.9  --    HGB  capsule 75 mg, Daily with breakfast  prasugrel (EFFIENT) tablet 10 mg, Daily  magnesium oxide (MAG-OX) tablet 400 mg, Daily  ferrous sulfate (FE TABS 325) EC tablet 325 mg, Daily  cyclobenzaprine (FLEXERIL) tablet 5 mg, TID PRN  insulin glargine (LANTUS) injection vial 42 Units, Nightly         Labs:   CBC:        Recent Labs     07/09/24  0608 07/10/24  0741   WBC 5.9  --    RBC 2.66*  --    HGB 9.8* 10.0*   HCT 29.7* 30.0*     --    MPV 10.9  --       BMP:        Recent Labs     07/09/24  0608 07/10/24  0741    135   K 4.4 4.4    98   CO2 22 23   BUN 25* 39*   CREATININE 3.7* 4.7*   GLUCOSE 242* 319*   CALCIUM 9.3 9.7         Magnesium:          Recent Labs     07/09/24  0608   MG 2.2         Assessment:     1 ESRD:dialysis [facility].  Patient received hemodialysis yesterday.  2.1 L removed.  2.admitted with chest pain, likely atypical, cardiology following.  No interventions from their standpoint reading through their note.  3 history of essential hypertension, blood pressures are okay:  4 history of coronary artery disease, had a cardiac cath in February of this year with couple stents.  WEEMS to LAD was patent and SVG to diagonal was patent as well.  5.  Diabetes mellitus type 2     Plan:  1.  Continue current meds        2.  Okay from my side for discharge anytime  3.  If she is still hospitalized, will need dialysis.   4. Following         Please do not hesitate to call with questions.     Electronically signed by Dionte Estrada MD on 7/11/2024 at 9:51 AM                      Electronically signed by Dionte Estrada MD on 7/11/2024  9:55 AM

## 2024-07-11 NOTE — PROGRESS NOTES
SPIRITUAL CARE DEPARTMENT [facility]  PROGRESS NOTE    Room # 2042/2042-01   Name: Estefany Garcia            Jain: Bahai     Reason for visit:  Socially Isolated    I visited the patient.    Admit Date & Time: 7/7/2024  9:25 AM    Assessment:  Estefany Garcia is a 64 y.o. female in the hospital because elevated Myocardial Infarction and Broken Arm.. Upon entering the room Pt. Was sitting up, alert, and conversant.      Intervention:  I introduced myself and my title as  I offered space for Pt.  to express feelings, needs, and concerns and provided a ministry presence.  P t. And Writer engaged in Prayer together.    Outcome:  Pt. Thanked Writer for Visit.    Plan:  Chaplains will remain available to offer spiritual and emotional support as needed.    Electronically signed by Chaplain Ermelinda, on 7/11/2024 at 7:10 PM.  Spiritual Care Department  [facility]       07/11/24 1907   Encounter Summary   Encounter Overview/Reason Spiritual/Emotional Needs;Loneliness/Social Isolation   Service Provided For Patient   Referral/Consult From Other (comment)  (Socially Isolated)   Support System Family members   Last Encounter  07/11/24   Complexity of Encounter Low   Begin Time 1855   End Time  1905   Total Time Calculated 10 min   Spiritual/Emotional needs   Type Spiritual Support   Assessment/Intervention/Outcome   Assessment Calm;Coping;Hopeful;Peaceful   Intervention Active listening;Sustaining Presence/Ministry of presence;Prayer (assurance of)/[place]   Outcome Connection/Belonging;Engaged in conversation;Expressed Gratitude   Plan and Referrals   Plan/Referrals No future visits requested

## 2024-07-11 NOTE — PLAN OF CARE
Problem: Discharge Planning  Goal: Discharge to home or other facility with appropriate resources  7/11/2024 1219 by Edwin Hoskins RN  Outcome: Progressing  7/11/2024 0121 by Terri Saez RN  Outcome: Progressing     Problem: Chronic Conditions and Co-morbidities  Goal: Patient's chronic conditions and co-morbidity symptoms are monitored and maintained or improved  7/11/2024 1219 by Edwin Hoskins RN  Outcome: Progressing  7/11/2024 0121 by Terri Saez RN  Outcome: Progressing     Problem: Safety - Adult  Goal: Free from fall injury  7/11/2024 1219 by Edwin Hoskins RN  Outcome: Progressing  7/11/2024 0121 by Terri Saez RN  Outcome: Progressing     Problem: Skin/Tissue Integrity  Goal: Absence of new skin breakdown  Description: 1.  Monitor for areas of redness and/or skin breakdown  2.  Assess vascular access sites hourly  3.  Every 4-6 hours minimum:  Change oxygen saturation probe site  4.  Every 4-6 hours:  If on nasal continuous positive airway pressure, respiratory therapy assess nares and determine need for appliance change or resting period.  7/11/2024 1219 by Edwin Hoskins RN  Outcome: Progressing  7/11/2024 0121 by Terri Saez RN  Outcome: Progressing     Problem: ABCDS Injury Assessment  Goal: Absence of physical injury  7/11/2024 1219 by Edwin Hoskins RN  Outcome: Progressing  7/11/2024 0121 by Terri Saez RN  Outcome: Progressing     Problem: Pain  Goal: Verbalizes/displays adequate comfort level or baseline comfort level  7/11/2024 1219 by Edwin Hoskins RN  Outcome: Progressing  7/11/2024 0121 by Terri Saez RN  Outcome: Progressing     Problem: Cardiovascular - Adult  Goal: Maintains optimal cardiac output and hemodynamic stability  7/11/2024 1219 by Edwin Hoskins RN  Outcome: Progressing  7/11/2024 0121 by Terri Saez RN  Outcome: Progressing  Goal: Absence of cardiac dysrhythmias or at baseline  7/11/2024 1219 by Edwin Hoskins RN  Outcome:

## 2024-07-11 NOTE — PROGRESS NOTES
[facility]  Progress Note                   Date:   7/11/2024  Patient name: Estefany Garcia  Date of admission:  7/7/2024  9:25 AM  MRN:   [id number]  YOB: 1960  PCP: Zulma Payne APRN - CNP    Reason for Admission: NSTEMI (non-ST elevated myocardial infarction) (H[facility]) [I21.4]    Subjective:       Clinical Changes /Abnormalities: Pt seen and examined in the room.  Patient resting in bed. Pt denies any CP or sob.  Labs, vitals and tele reviewed    Medications:   Scheduled Meds:   [START ON 7/12/2024] epoetin raphael-epbx  3,000 Units SubCUTAneous Once per day on Mon Wed Fri    sodium chloride flush  5-40 mL IntraVENous 2 times per day    atorvastatin  80 mg Oral Nightly    aspirin  81 mg Oral Daily    carvedilol  3.125 mg Oral BID    bumetanide  3 mg Oral BID    insulin glargine  72 Units SubCUTAneous QAM    sodium bicarbonate  1,300 mg Oral BID    insulin lispro  0-8 Units SubCUTAneous TID WC    insulin lispro  0-4 Units SubCUTAneous Nightly    allopurinol  100 mg Oral Daily    docusate sodium  100 mg Oral BID    busPIRone  10 mg Oral TID    sevelamer  1,600 mg Oral TID WC    venlafaxine  75 mg Oral Daily with breakfast    prasugrel  10 mg Oral Daily    magnesium oxide  400 mg Oral Daily    ferrous sulfate  325 mg Oral Daily    insulin glargine  42 Units SubCUTAneous Nightly     Continuous Infusions:   sodium chloride      dextrose       CBC:   Recent Labs     07/09/24  0608 07/10/24  0741   WBC 5.9  --    HGB 9.8* 10.0*     --        BMP:    Recent Labs     07/09/24  0608 07/10/24  0741    135   K 4.4 4.4    98   CO2 22 23   BUN 25* 39*   CREATININE 3.7* 4.7*   GLUCOSE 242* 319*       Hepatic:No results for input(s): \"AST\", \"ALT\", \"BILITOT\", \"ALKPHOS\" in the last 72 hours.    Invalid input(s): \"ALB\"  Troponin:   Recent Labs     07/09/24  0608   TROPHS 436*       BNP: No results for input(s): \"BNP\" in the last 72 hours.  Lipids:   No results for input(s): \"CHOL\",  on exam  Type II DM  ESRD on HD  ERICK, using CPAP    Patient Active Problem List:     Atherosclerosis of coronary artery bypass graft of native heart with angina pectoris ([facility])     Acute kidney injury superimposed on CKD ([facility])     Chronic diastolic heart failure ([facility])     Diabetic polyneuropathy associated with type 2 diabetes mellitus ([facility])     History of coronary artery bypass graft     Iron deficiency anemia     Spinal stenosis of lumbar region with neurogenic claudication     Mixed hyperlipidemia     CKD (chronic kidney disease) stage 4, GFR 15-29 ml/min ([facility])     Type 2 diabetes 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, with long-term current use of insulin ([facility])     Obesity, Class II, BMI 35-39.9     Thyroid nodule greater than or equal to 1 cm in diameter incidentally noted on imaging study     Essential hypertension     Anemia of chronic disease     Chronic ischemic heart disease     Ischemic stroke of frontal lobe ([facility])     Morbid obesity with BMI of 40.0-44.9, adult ([facility])     Disequilibrium syndrome     Anxiety     Chronic midline low back pain with bilateral sciatica     Acute thoracic back pain     COVID     Delirium due to another medical condition     Cerebral hypoperfusion     Immature arteriovenous fistula ([facility])     History of fusion of cervical spine     Depression with anxiety     Vancomycin resistant Enterococcus UTI     ESRD on hemodialysis ([facility])     Dialysis disequilibrium syndrome     Acute cystitis without hematuria     VRE infection (vancomycin resistant Enterococcus)     Infection due to ESBL-producing Klebsiella pneumoniae     Class 2 severe obesity due to excess calories with serious comorbidity and body mass index (BMI) of 35.0 to 35.9 in adult ([facility])     Type 2 diabetes mellitus with hyperglycemia, with long-term current use of insulin ([facility])     Right hemiparesis ([facility])     Ulcer of left foot, limited to breakdown of skin ([facility])     Secondary hyperparathyroidism ([facility])

## 2024-07-11 NOTE — PROGRESS NOTES
Physical Therapy  Facility/Department: [facility]  Daily Treatment Note  NAME: Estefany Garcia  : 1960  MRN: [id number]    Date of Service: 2024    Discharge Recommendations:  Patient would benefit from continued therapy after discharge   Patient Diagnosis(es): The encounter diagnosis was Closed fracture of right wrist, initial encounter.  Assessment   Assessment: Pt is slowly progressing towards goals however continues with deficits in strength, endurance and balance requiring min to mod A for mobility at this time. Pt also c/o dizziness with ambulation limiting distance as she requires seated rest breaks. Pt would benefit from continued skilled therapy to increase activity tolerance, safety and independence with all ADLs.   Activity Tolerance: Patient limited by endurance;Patient limited by fatigue (Pt limited by dizziness)   Plan    Physical Therapy Plan  General Plan: 5-7 times per week  Specific Instructions for Next Treatment: RW with R UE Platform  Current Treatment Recommendations: Strengthening;Balance training;Functional mobility training;Transfer training;Home exercise program;Safety education & training;Patient/Caregiver education & training;Therapeutic activities;Endurance training;Gait training;Neuromuscular re-education;Equipment evaluation, education, & procurement;Pain management;Positioning   Restrictions  Restrictions/Precautions  Restrictions/Precautions: General Precautions, Contact Precautions, Up as Tolerated, Weight Bearing  Required Braces or Orthoses?: Yes (R UE)  Upper Extremity Weight Bearing Restrictions  Right Upper Extremity Weight Bearing: Non Weight Bearing  Other: Per [facility] notes; okay for WBAT with platform walker  Position Activity Restriction  Other position/activity restrictions: R UE IV;  NWBing R UE;  No BP L UE or L LE; Ext Cath; Contact Precautions (ESBL; MDRO; MRSA; VRE)   Subjective    Subjective  Subjective: Pt agreeable to PT session (Nurse gives  Individual Concurrent Group Co-treatment   Time In       1410   Time Out       1448   Minutes       38     Co-treatment with OT warranted secondary to decreased safety and independence requiring 2 skilled therapy professionals to address individual discipline's goals. PT addressing   , pre gait trunk strengthening, weight shifting prior to transfers, transfer training, and postural control in sitting.   Winsome Wright, PTA

## 2024-07-11 NOTE — PROGRESS NOTES
End Of Shift Note  [facility]  Summary of shift: Blood pressures taken on right leg remain low but Patient is alert and oriented, asymptomatic.Tramadol q4h prn ordered for pain since flexeril + tylenol has become less effective. However, patient fell asleep prior to tramadol administration and has not called out for pain. At 8pm assessment, patient's IV was observed to be dislodged and removed. Oxygen titrated to 1LNC and maintaining 93% while sleeping supine. Internal medicine notified of loss of IV access, but she has no IV medications and discharge order placed awaiting placement.     **Paper script for Norco in physical chart.     Vitals:    Vitals:    07/11/24 0000 07/11/24 0034 07/11/24 0200 07/11/24 0400   BP: (!) 111/48  (!) 91/40 (!) 106/50   Pulse: 73 77 69 65   Resp: 20   16   Temp: 97.6 °F (36.4 °C)   97.5 °F (36.4 °C)   TempSrc: Temporal   Temporal   SpO2:  99%     Weight:    111 kg (244 lb 11.4 oz)   Height:            I&O:   Intake/Output Summary (Last 24 hours) at 7/11/2024 0546  Last data filed at 7/11/2024 0400  Gross per 24 hour   Intake 500 ml   Output 2150 ml   Net -1650 ml       Resp Status: 2LNC    Ventilator Settings:     / / /     Critical Care IV infusions:   sodium chloride      dextrose          LDA:   External Urinary Catheter (Active)   Number of days: 3       Hemodialysis Fistula/Graft Left Forearm (Active)   Number of days:        Hemodialysis Fistula/Graft Left Arm (Active)   Number of days: 765       Hemodialysis Fistula/Graft Arteriovenous fistula Left Forearm (Active)   Number of days:        Wound 06/09/24 Buttocks Left;Right (Active)   Number of days: 31       Wound 07/07/24 Radial Right (Active)   Number of days: 3

## 2024-07-11 NOTE — CARE COORDINATION
Social Work-Spoke with Reena fernandez [place]. Auth is still pending. They will need the precert because patient was there under her Medicare skilled benefit. Deja

## 2024-07-12 VITALS
DIASTOLIC BLOOD PRESSURE: 62 MMHG | OXYGEN SATURATION: 95 % | RESPIRATION RATE: 16 BRPM | SYSTOLIC BLOOD PRESSURE: 133 MMHG | BODY MASS INDEX: 40.7 KG/M2 | TEMPERATURE: 97.1 F | HEIGHT: 65 IN | WEIGHT: 244.27 LBS | HEART RATE: 76 BPM

## 2024-07-12 LAB
ANION GAP SERPL CALCULATED.3IONS-SCNC: 12 MMOL/L (ref 9–17)
BUN SERPL-MCNC: 30 MG/DL (ref 8–23)
BUN/CREAT SERPL: 8 (ref 9–20)
CALCIUM SERPL-MCNC: 9.3 MG/DL (ref 8.6–10.4)
CHLORIDE SERPL-SCNC: 101 MMOL/L (ref 98–107)
CO2 SERPL-SCNC: 24 MMOL/L (ref 20–31)
CREAT SERPL-MCNC: 4 MG/DL (ref 0.5–0.9)
GFR, ESTIMATED: 12 ML/MIN/1.73M2
GLUCOSE SERPL-MCNC: 164 MG/DL (ref 70–99)
HCT VFR BLD AUTO: 29.6 % (ref 36.3–47.1)
HGB BLD-MCNC: 9.9 G/DL (ref 11.9–15.1)
POTASSIUM SERPL-SCNC: 3.9 MMOL/L (ref 3.7–5.3)
SODIUM SERPL-SCNC: 137 MMOL/L (ref 135–144)

## 2024-07-12 PROCEDURE — 36415 COLL VENOUS BLD VENIPUNCTURE: CPT

## 2024-07-12 PROCEDURE — 99238 HOSP IP/OBS DSCHRG MGMT 30/<: CPT | Performed by: INTERNAL MEDICINE

## 2024-07-12 PROCEDURE — 99233 SBSQ HOSP IP/OBS HIGH 50: CPT | Performed by: NURSE PRACTITIONER

## 2024-07-12 PROCEDURE — 2700000000 HC OXYGEN THERAPY PER DAY

## 2024-07-12 PROCEDURE — 6370000000 HC RX 637 (ALT 250 FOR IP): Performed by: NURSE PRACTITIONER

## 2024-07-12 PROCEDURE — 90935 HEMODIALYSIS ONE EVALUATION: CPT

## 2024-07-12 PROCEDURE — 80048 BASIC METABOLIC PNL TOTAL CA: CPT

## 2024-07-12 PROCEDURE — 85014 HEMATOCRIT: CPT

## 2024-07-12 PROCEDURE — 85018 HEMOGLOBIN: CPT

## 2024-07-12 RX ADMIN — BUMETANIDE 3 MG: 1 TABLET ORAL at 14:57

## 2024-07-12 RX ADMIN — BUSPIRONE HYDROCHLORIDE 10 MG: 10 TABLET ORAL at 14:57

## 2024-07-12 RX ADMIN — FERROUS SULFATE TAB EC 325 MG (65 MG FE EQUIVALENT) 325 MG: 325 (65 FE) TABLET DELAYED RESPONSE at 14:57

## 2024-07-12 RX ADMIN — VENLAFAXINE HYDROCHLORIDE 75 MG: 75 CAPSULE, EXTENDED RELEASE ORAL at 14:57

## 2024-07-12 RX ADMIN — ASPIRIN 81 MG CHEWABLE TABLET 81 MG: 81 TABLET CHEWABLE at 14:57

## 2024-07-12 RX ADMIN — INSULIN GLARGINE 72 UNITS: 100 INJECTION, SOLUTION SUBCUTANEOUS at 11:12

## 2024-07-12 RX ADMIN — SEVELAMER CARBONATE 1600 MG: 800 TABLET, FILM COATED ORAL at 14:56

## 2024-07-12 RX ADMIN — ALLOPURINOL 100 MG: 100 TABLET ORAL at 14:57

## 2024-07-12 RX ADMIN — PRASUGREL 10 MG: 10 TABLET, FILM COATED ORAL at 14:57

## 2024-07-12 RX ADMIN — DOCUSATE SODIUM 100 MG: 100 CAPSULE, LIQUID FILLED ORAL at 14:57

## 2024-07-12 RX ADMIN — SODIUM BICARBONATE 1300 MG: 650 TABLET ORAL at 14:57

## 2024-07-12 NOTE — PROGRESS NOTES
[facility]  Progress Note                   Date:   7/12/2024  Patient name: Estefany Garcia  Date of admission:  7/7/2024  9:25 AM  MRN:   [id number]  YOB: 1960  PCP: Zulma Payne APRN - CNP    Reason for Admission: NSTEMI (non-ST elevated myocardial infarction) ([facility]) [I21.4]    Subjective:       Clinical Changes /Abnormalities: Pt seen and examined in the room.  Patient resting up in bed. Pt denies any CP or sob.  Labs, vitals and tele reviewed SR    Medications:   Scheduled Meds:   epoetin raphael-epbx  3,000 Units SubCUTAneous Once per day on Mon Wed Fri    sodium chloride flush  5-40 mL IntraVENous 2 times per day    atorvastatin  80 mg Oral Nightly    aspirin  81 mg Oral Daily    carvedilol  3.125 mg Oral BID    bumetanide  3 mg Oral BID    insulin glargine  72 Units SubCUTAneous QAM    sodium bicarbonate  1,300 mg Oral BID    insulin lispro  0-8 Units SubCUTAneous TID WC    insulin lispro  0-4 Units SubCUTAneous Nightly    allopurinol  100 mg Oral Daily    docusate sodium  100 mg Oral BID    busPIRone  10 mg Oral TID    sevelamer  1,600 mg Oral TID WC    venlafaxine  75 mg Oral Daily with breakfast    prasugrel  10 mg Oral Daily    magnesium oxide  400 mg Oral Daily    ferrous sulfate  325 mg Oral Daily    insulin glargine  42 Units SubCUTAneous Nightly     Continuous Infusions:   sodium chloride      dextrose       CBC:   Recent Labs     07/10/24  0741 07/12/24  0609   HGB 10.0* 9.9*       BMP:    Recent Labs     07/10/24  0741 07/12/24  0609    137   K 4.4 3.9   CL 98 101   CO2 23 24   BUN 39* 30*   CREATININE 4.7* 4.0*   GLUCOSE 319* 164*       Hepatic:No results for input(s): \"AST\", \"ALT\", \"BILITOT\", \"ALKPHOS\" in the last 72 hours.    Invalid input(s): \"ALB\"  Troponin:   No results for input(s): \"TROPHS\" in the last 72 hours.    BNP: No results for input(s): \"BNP\" in the last 72 hours.  Lipids:   No results for input(s): \"CHOL\", \"HDL\" in the last 72  hours.    Invalid input(s): \"LDLCALCU\"    INR:   No results for input(s): \"INR\" in the last 72 hours.      Objective:   Vitals: /88   Pulse 80   Temp 97.9 °F (36.6 °C) (Temporal)   Resp 16   Ht 1.651 m (5' 5\")   Wt 111.5 kg (245 lb 13 oz)   SpO2 (!) 86%   BMI 40.91 kg/m²     General appearance: alert and cooperative with exam  HEENT: Head: Normocephalic, no lesions, without obvious abnormality.  Neck:no JVD, trachea midline, no adenopathy  Lungs: Clear to auscultation, dim throughout. No distress on NC  Heart: Regular rate and rhythm, s1/s2 auscultated, no murmurs  Abdomen: soft, non-tender, bowel sounds active  Extremities: no edema  Neurologic: not done    Stress test 2/26/24  IMPRESSION:     [Large infarct with stalin-infarct ischemia in the apex, lateral and inferior wall]     Normal LVEF      Risk stratification: [Low risk.]     Author: Adrián Jaquez MD      Cardiac cath 2/28/24  Severe native vessel CAD.   Patent LIMA-LAD. Patent SVG-Diagonal with patent stent.   RCA 70% mid ISR reduced to 0% using high pressure 3 mm NC balloon PTCA. RPDA has 95% ISR reduced to 0% using high pressure 2.25 NC balloon PTCA.      Recommendations     DAPT and risk factor modifications.        ECHO 2/2/24    Left Ventricle: Normal left ventricular systolic function with a visually estimated EF of 55 - 60%. Left ventricle size is normal. Mild septal thickening. Normal wall motion. Abnormal diastolic function. Average E/e' ratio is 17.97.    Aorta: Normal sized ascending aorta. Dilated aortic root. Ao root diameter is 2.9 cm.    Image quality is fair.    Assessment / Acute Cardiac Problems:   Brief episode of focal, atypical chest discomfort after awakening SOB off CPAP; no evidence of acute coronary syndrome  Stable CAD, h/o CABG with catheterization Feb 2024 showing patent bypass grafts with mid RCA and RPDA in-stent restenosis successfully treated with angioplasty  Essential HTN with preserved LVEF; no CHF on

## 2024-07-12 NOTE — PROGRESS NOTES
injection 3,000 Units, Once per day on Mon Wed Fri  polyethyl glycol-propyl glycol 0.4-0.3 % (SYSTANE) ophthalmic solution 1 drop, PRN  calcium carbonate (TUMS) chewable tablet 1,000 mg, TID PRN  sodium chloride flush 0.9 % injection 5-40 mL, 2 times per day  sodium chloride flush 0.9 % injection 10 mL, PRN  0.9 % sodium chloride infusion, PRN  ondansetron (ZOFRAN-ODT) disintegrating tablet 4 mg, Q8H PRN   Or  ondansetron (ZOFRAN) injection 4 mg, Q6H PRN  atorvastatin (LIPITOR) tablet 80 mg, Nightly  polyethylene glycol (GLYCOLAX) packet 17 g, Daily PRN  aspirin chewable tablet 81 mg, Daily  carvedilol (COREG) tablet 3.125 mg, BID  melatonin tablet 9 mg, Nightly PRN  midodrine (PROAMATINE) tablet 5 mg, PRN  bumetanide (BUMEX) tablet 3 mg, BID  insulin glargine (LANTUS) injection vial 72 Units, QAM  glucose chewable tablet 16 g, PRN  dextrose bolus 10% 125 mL, PRN   Or  dextrose bolus 10% 250 mL, PRN  glucagon injection 1 mg, PRN  dextrose 10 % infusion, Continuous PRN  sodium bicarbonate tablet 1,300 mg, BID  insulin lispro (HUMALOG,ADMELOG) injection vial 0-8 Units, TID WC  insulin lispro (HUMALOG,ADMELOG) injection vial 0-4 Units, Nightly  allopurinol (ZYLOPRIM) tablet 100 mg, Daily  docusate sodium (COLACE) capsule 100 mg, BID  busPIRone (BUSPAR) tablet 10 mg, TID  sevelamer (RENVELA) tablet 1,600 mg, TID WC  venlafaxine (EFFEXOR XR) extended release capsule 75 mg, Daily with breakfast  prasugrel (EFFIENT) tablet 10 mg, Daily  magnesium oxide (MAG-OX) tablet 400 mg, Daily  ferrous sulfate (FE TABS 325) EC tablet 325 mg, Daily  cyclobenzaprine (FLEXERIL) tablet 5 mg, TID PRN  insulin glargine (LANTUS) injection vial 42 Units, Nightly      Labs:   CBC:   Recent Labs     07/10/24  0741 07/12/24  0609   HGB 10.0* 9.9*   HCT 30.0* 29.6*      BMP:   Recent Labs     07/10/24  0741 07/12/24  0609    137   K 4.4 3.9   CL 98 101   CO2 23 24   BUN 39* 30*   CREATININE 4.7* 4.0*   GLUCOSE 319* 164*   CALCIUM 9.7 9.3         Dialysis bath: Dialysis Bath  K+ (Potassium): 3  Ca+ (Calcium): 2.5  Na+ (Sodium): 138  HCO3 (Bicarb): 30    Radiology:  Reviewed as available.    Assessment:    1 ESRD:dialysis [facility].  Dialysis orders were reviewed with nurse at bedside.  New dry weight is 111 kg.  2.admitted with chest pain, likely atypical, cardiology following.  No interventions from their standpoint reading through their note.  3 history of essential hypertension, blood pressures are okay:  4 history of coronary artery disease, had a cardiac cath in February of this year with couple stents.  WEEMS to LAD was patent and SVG to diagonal was patent as well.  5.  Diabetes mellitus type 2    Plan:  1. Wt removal and dialysis orders reviewed.    2.  Okay for discharge from renal standpoint after dialysis anytime.  3. Cont pt on aranesp and zemplar per protocol.   4. Following     Please do not hesitate to call with questions.    Electronically signed by Dionte Estrada MD on 7/12/2024 at 11:15 AM

## 2024-07-12 NOTE — PROGRESS NOTES
Physical Therapy  DATE: 2024    NAME: Estefany Garcia  MRN: [id number]   : 1960    Patient not seen this date for Physical Therapy due to:      [] Cancel by RN or physician due to:    [x] Hemodialysis    [] Critical Lab Value Level     [] Blood transfusion in progress    [] Acute or unstable cardiovascular status   _MAP < 55 or more than >115  _HR < 40 or > 130    [] Acute or unstable pulmonary status   -FiO2 > 60%   _RR < 5 or >40    _O2 sats < 85%    [] Strict Bedrest    [] Off Unit for surgery or procedure    [] Off Unit for testing       [] Pending imaging to R/O fracture    [] Refusal by Patient      [] Other      [] PT being discontinued at this time. Patient independent. No further needs.     [] PT being discontinued at this time as the patient has been transferred to hospice care. No further needs.      Noy Lane, PT

## 2024-07-12 NOTE — PROGRESS NOTES
Transport arrived at this time. Pt assisted to stand/pivot to wheelchair. Provided belongings. Sister & KIRAN Hart updated with plan and time of transport. Attending aware as well. Discharge packet provided. Will call report.     Patient wheeled down to ambulette via transporter at this time.

## 2024-07-12 NOTE — PROGRESS NOTES
HEMODIALYSIS PRE-TREATMENT NOTE     Patient Identifiers prior to treatment: Name  MRN     Isolation Required: Yes                      Isolation Type: Contact        (please document if patient is being managed as a PUI/COVID-19 patient)           Hepatitis status:                                                                     Date Drawn                             Result  Hepatitis B Surface Antigen 2024     NEG                        Hepatitis B Surface Antibody 2024 POS       1000   Hepatitis B Core Antibody 2024 NEG              [facility]     Was a copy of the labs you documented provided to facility for the patient's chart: Yes     Hemodialysis orders verified: Yes      Access Within normal limits ( I.e. s/s of infection,...): WNL      Pre-Assessment completed: Yes by Ángel Delvalle Rn     Pre-dialysis report received from: Debra AGUAYO                      Time: 930

## 2024-07-12 NOTE — PROGRESS NOTES
Occupational Therapy  DATE: 2024    NAME: Estefany Garcia  MRN: [id number]   : 1960    Patient not seen this date for Occupational Therapy due to:      [] Cancel by RN or physician due to:    [x] Hemodialysis: Pt. Checked on for treatment at 10:00 a.m. and in dialysis. OT will cont to follow.    [] Critical Lab Value Level     [] Blood transfusion in progress    [] Acute or unstable cardiovascular status   _MAP < 55 or more than >115  _HR < 40 or > 130    [] Acute or unstable pulmonary status   -FiO2 > 60%   _RR < 5 or >40    _O2 sats < 85%    [] Strict Bedrest    [] Off Unit for surgery or procedure    [] Off Unit for testing       [] Pending imaging to R/O fracture    [] Refusal by Patient      [] Other      [] OT being discontinued at this time. Patient independent. No further needs.     [] OT being discontinued at this time as the patient has been transferred to hospice care. No further needs.      YULIA PUGH, SYBIL

## 2024-07-12 NOTE — PLAN OF CARE
probe site  4.  Every 4-6 hours:  If on nasal continuous positive airway pressure, respiratory therapy assess nares and determine need for appliance change or resting period.  Outcome: Progressing     Problem: ABCDS Injury Assessment  Goal: Absence of physical injury  Outcome: Progressing     Problem: Pain  Goal: Verbalizes/displays adequate comfort level or baseline comfort level  Outcome: Progressing     Problem: Cardiovascular - Adult  Goal: Maintains optimal cardiac output and hemodynamic stability  Outcome: Progressing  Flowsheets (Taken 7/11/2024 2000)  Maintains optimal cardiac output and hemodynamic stability:   Monitor blood pressure and heart rate   Monitor urine output and notify Licensed Independent Practitioner for values outside of normal range   Assess for signs of decreased cardiac output   Administer fluid and/or volume expanders as ordered   Administer vasoactive medications as ordered  Goal: Absence of cardiac dysrhythmias or at baseline  Outcome: Progressing     Problem: Cardiovascular - Adult  Goal: Absence of cardiac dysrhythmias or at baseline  Outcome: Progressing

## 2024-07-12 NOTE — CARE COORDINATION
Social work: Patient to dc to [facility] [place] via [facility] at 4:00PM.  # for RN report: [phone]. Completed ANTHONY faxed to [phone].  Informed RN, pt, and facility of dc time, agreeable to plan.

## 2024-07-12 NOTE — PROGRESS NOTES
HEMODIALYSIS POST TREATMENT NOTE    Treatment time ordered: 210    Actual treatment time: 210    UltraFiltration Goal: 2500  UltraFiltration Removed: 2500      Pre Treatment weight: 113.3  Post Treatment weight: 110.8  Estimated Dry Weight: 111    Access used:     Central Venous Catheter:          Tunneled or Non-tunneled: na           Site: na          Access Flow: na      Internal Access:       AV Fistula or AV Graft: AVF         Site: ROBIN       Access Flow: 350       Sign and symptoms of infection: na       If YES: na    Medications or blood products given: see MAR    Chronic outpatient schedule: [facility]    Chronic outpatient unit: [facility]    Summary of response to treatment: Pt tx d/c , all blood returned dialyzer streaked, both needles pulled, held 5 minutes each, w/o issue, 2500 removed from pt today.     Explain if orders NOT met, was physician notified:trinh      ACES flowsheet faxed to patient unit/ placed in patient chart: Yes    Post assessment completed: YES, Ángel Delvalle Rn    Report given to: Debra Sandoval Intra-treatment documented Safety Checks include the followin) Access and face visible at all times.     2) All connections and blood lines are secure with no kinks.     3) NVL alarm engaged.     4) Hemosafe device applied (if applicable).     5) No collapse of Arterial or Venous blood chambers.     6) All blood lines / pump segments in the air detectors.

## 2024-07-12 NOTE — PROGRESS NOTES
40.0-44.9, adult ([facility]) 7/7/2024 Yes    Anxiety 7/7/2024 Yes    ESRD (end stage renal disease) on dialysis ([facility]) 7/7/2024 Yes    ERICK (obstructive sleep apnea) 7/7/2024 Yes       Plan:        Am treating as MI as no other definitive cause of elevated troponins above her baseline.  Already on statin, aspirin and effient.  Beta blocker not given when sbp<120 due to issues with hypotension on dialysis.  adjusted this to be given on non-dialysis days unless very low bp.  This way she will get more beta blockade  Dc to snf; awaiting hanna Gill,   7/12/2024  8:16 AM
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
## 2024-07-19 ENCOUNTER — HOSPITAL ENCOUNTER (EMERGENCY)
Age: 64
Discharge: ANOTHER HOSPITAL | End: 2024-07-19
Attending: EMERGENCY MEDICINE
Payer: COMMERCIAL

## 2024-07-19 ENCOUNTER — APPOINTMENT (OUTPATIENT)
Dept: GENERAL RADIOLOGY | Age: 64
End: 2024-07-19
Payer: COMMERCIAL

## 2024-07-19 ENCOUNTER — HOSPITAL ENCOUNTER (OUTPATIENT)
Age: 64
Setting detail: OBSERVATION
Discharge: NURSING, REHAB OR LONG-TERM CARE | End: 2024-07-26
Attending: INTERNAL MEDICINE | Admitting: FAMILY MEDICINE
Payer: COMMERCIAL

## 2024-07-19 VITALS
DIASTOLIC BLOOD PRESSURE: 42 MMHG | WEIGHT: 250 LBS | HEIGHT: 65 IN | BODY MASS INDEX: 41.65 KG/M2 | OXYGEN SATURATION: 94 % | SYSTOLIC BLOOD PRESSURE: 111 MMHG | HEART RATE: 79 BPM | TEMPERATURE: 98.2 F | RESPIRATION RATE: 21 BRPM

## 2024-07-19 DIAGNOSIS — R79.89 ELEVATED TROPONIN: Primary | ICD-10-CM

## 2024-07-19 LAB
ALBUMIN SERPL-MCNC: 3.6 G/DL (ref 3.5–5.2)
ALBUMIN/GLOB SERPL: 1.2 {RATIO} (ref 1–2.5)
ALP SERPL-CCNC: 121 U/L (ref 35–104)
ALT SERPL-CCNC: 12 U/L (ref 5–33)
ANION GAP SERPL CALCULATED.3IONS-SCNC: 11 MMOL/L (ref 9–17)
AST SERPL-CCNC: 15 U/L
BASOPHILS # BLD: 0.1 K/UL (ref 0–0.2)
BASOPHILS NFR BLD: 1 % (ref 0–2)
BILIRUB SERPL-MCNC: 0.7 MG/DL (ref 0.3–1.2)
BNP SERPL-MCNC: 4150 PG/ML
BUN SERPL-MCNC: 30 MG/DL (ref 8–23)
CALCIUM SERPL-MCNC: 9.6 MG/DL (ref 8.6–10.4)
CHLORIDE SERPL-SCNC: 97 MMOL/L (ref 98–107)
CO2 SERPL-SCNC: 30 MMOL/L (ref 20–31)
CREAT SERPL-MCNC: 4.5 MG/DL (ref 0.5–0.9)
EOSINOPHIL # BLD: 0.1 K/UL (ref 0–0.4)
EOSINOPHILS RELATIVE PERCENT: 2 % (ref 1–4)
ERYTHROCYTE [DISTWIDTH] IN BLOOD BY AUTOMATED COUNT: 18.9 % (ref 12.5–15.4)
GFR, ESTIMATED: 10 ML/MIN/1.73M2
GLUCOSE SERPL-MCNC: 232 MG/DL (ref 70–99)
HCT VFR BLD AUTO: 26.9 % (ref 36–46)
HGB BLD-MCNC: 9.3 G/DL (ref 12–16)
INR PPP: 0.9
LIPASE SERPL-CCNC: 18 U/L (ref 13–60)
LYMPHOCYTES NFR BLD: 1.2 K/UL (ref 1–4.8)
LYMPHOCYTES RELATIVE PERCENT: 21 % (ref 24–44)
MAGNESIUM SERPL-MCNC: 2.7 MG/DL (ref 1.6–2.6)
MCH RBC QN AUTO: 37.8 PG (ref 26–34)
MCHC RBC AUTO-ENTMCNC: 34.7 G/DL (ref 31–37)
MCV RBC AUTO: 108.8 FL (ref 80–100)
MONOCYTES NFR BLD: 0.4 K/UL (ref 0.1–1.2)
MONOCYTES NFR BLD: 6 % (ref 2–11)
NEUTROPHILS NFR BLD: 70 % (ref 36–66)
NEUTS SEG NFR BLD: 3.9 K/UL (ref 1.8–7.7)
PARTIAL THROMBOPLASTIN TIME: 20.2 SEC (ref 21.3–31.3)
PLATELET # BLD AUTO: 177 K/UL (ref 140–450)
PMV BLD AUTO: 8.5 FL (ref 6–12)
POTASSIUM SERPL-SCNC: 4.1 MMOL/L (ref 3.7–5.3)
PROT SERPL-MCNC: 6.7 G/DL (ref 6.4–8.3)
PROTHROMBIN TIME: 9.9 SEC (ref 9.4–12.6)
RBC # BLD AUTO: 2.47 M/UL (ref 4–5.2)
SODIUM SERPL-SCNC: 138 MMOL/L (ref 135–144)
TROPONIN I SERPL HS-MCNC: 186 NG/L (ref 0–14)
TROPONIN I SERPL HS-MCNC: 194 NG/L (ref 0–14)
TROPONIN I SERPL HS-MCNC: 203 NG/L (ref 0–14)
WBC OTHER # BLD: 5.7 K/UL (ref 3.5–11)

## 2024-07-19 PROCEDURE — 83735 ASSAY OF MAGNESIUM: CPT

## 2024-07-19 PROCEDURE — G0378 HOSPITAL OBSERVATION PER HR: HCPCS

## 2024-07-19 PROCEDURE — 84484 ASSAY OF TROPONIN QUANT: CPT

## 2024-07-19 PROCEDURE — 2060000000 HC ICU INTERMEDIATE R&B

## 2024-07-19 PROCEDURE — 83880 ASSAY OF NATRIURETIC PEPTIDE: CPT

## 2024-07-19 PROCEDURE — G0379 DIRECT REFER HOSPITAL OBSERV: HCPCS

## 2024-07-19 PROCEDURE — 36415 COLL VENOUS BLD VENIPUNCTURE: CPT

## 2024-07-19 PROCEDURE — 90935 HEMODIALYSIS ONE EVALUATION: CPT

## 2024-07-19 PROCEDURE — 93005 ELECTROCARDIOGRAM TRACING: CPT | Performed by: INTERNAL MEDICINE

## 2024-07-19 PROCEDURE — 85610 PROTHROMBIN TIME: CPT

## 2024-07-19 PROCEDURE — 96372 THER/PROPH/DIAG INJ SC/IM: CPT

## 2024-07-19 PROCEDURE — 93005 ELECTROCARDIOGRAM TRACING: CPT | Performed by: EMERGENCY MEDICINE

## 2024-07-19 PROCEDURE — 6360000002 HC RX W HCPCS: Performed by: INTERNAL MEDICINE

## 2024-07-19 PROCEDURE — 6370000000 HC RX 637 (ALT 250 FOR IP): Performed by: INTERNAL MEDICINE

## 2024-07-19 PROCEDURE — 6360000002 HC RX W HCPCS: Performed by: NURSE PRACTITIONER

## 2024-07-19 PROCEDURE — P9047 ALBUMIN (HUMAN), 25%, 50ML: HCPCS | Performed by: INTERNAL MEDICINE

## 2024-07-19 PROCEDURE — 99223 1ST HOSP IP/OBS HIGH 75: CPT

## 2024-07-19 PROCEDURE — 2580000003 HC RX 258: Performed by: NURSE PRACTITIONER

## 2024-07-19 PROCEDURE — 85730 THROMBOPLASTIN TIME PARTIAL: CPT

## 2024-07-19 PROCEDURE — 85025 COMPLETE CBC W/AUTO DIFF WBC: CPT

## 2024-07-19 PROCEDURE — 99285 EMERGENCY DEPT VISIT HI MDM: CPT

## 2024-07-19 PROCEDURE — 71045 X-RAY EXAM CHEST 1 VIEW: CPT

## 2024-07-19 PROCEDURE — 80053 COMPREHEN METABOLIC PANEL: CPT

## 2024-07-19 PROCEDURE — 83690 ASSAY OF LIPASE: CPT

## 2024-07-19 PROCEDURE — 6370000000 HC RX 637 (ALT 250 FOR IP)

## 2024-07-19 RX ORDER — ALBUMIN (HUMAN) 12.5 G/50ML
25 SOLUTION INTRAVENOUS PRN
Status: DISCONTINUED | OUTPATIENT
Start: 2024-07-19 | End: 2024-07-26 | Stop reason: HOSPADM

## 2024-07-19 RX ORDER — SODIUM CHLORIDE 0.9 % (FLUSH) 0.9 %
5-40 SYRINGE (ML) INJECTION EVERY 12 HOURS SCHEDULED
Status: DISCONTINUED | OUTPATIENT
Start: 2024-07-19 | End: 2024-07-26 | Stop reason: HOSPADM

## 2024-07-19 RX ORDER — NITROGLYCERIN 0.1MG/HR
1 PATCH, TRANSDERMAL 24 HOURS TRANSDERMAL DAILY
Status: DISCONTINUED | OUTPATIENT
Start: 2024-07-19 | End: 2024-07-19

## 2024-07-19 RX ORDER — ACETAMINOPHEN 325 MG/1
650 TABLET ORAL EVERY 6 HOURS PRN
Status: DISCONTINUED | OUTPATIENT
Start: 2024-07-19 | End: 2024-07-26 | Stop reason: HOSPADM

## 2024-07-19 RX ORDER — ACETAMINOPHEN 650 MG/1
650 SUPPOSITORY RECTAL EVERY 6 HOURS PRN
Status: DISCONTINUED | OUTPATIENT
Start: 2024-07-19 | End: 2024-07-26 | Stop reason: HOSPADM

## 2024-07-19 RX ORDER — NITROGLYCERIN 0.1MG/HR
1 PATCH, TRANSDERMAL 24 HOURS TRANSDERMAL DAILY
Status: DISCONTINUED | OUTPATIENT
Start: 2024-07-19 | End: 2024-07-20

## 2024-07-19 RX ORDER — MIDODRINE HYDROCHLORIDE 10 MG/1
10 TABLET ORAL PRN
Status: DISCONTINUED | OUTPATIENT
Start: 2024-07-19 | End: 2024-07-26 | Stop reason: HOSPADM

## 2024-07-19 RX ORDER — HYDROCODONE BITARTRATE AND ACETAMINOPHEN 5; 325 MG/1; MG/1
1 TABLET ORAL EVERY 6 HOURS PRN
Status: ON HOLD | COMMUNITY
End: 2024-07-20 | Stop reason: HOSPADM

## 2024-07-19 RX ORDER — INSULIN LISPRO 100 [IU]/ML
0-4 INJECTION, SOLUTION INTRAVENOUS; SUBCUTANEOUS NIGHTLY
Status: DISCONTINUED | OUTPATIENT
Start: 2024-07-19 | End: 2024-07-21

## 2024-07-19 RX ORDER — POTASSIUM CHLORIDE 750 MG/1
10 CAPSULE, EXTENDED RELEASE ORAL 2 TIMES DAILY
COMMUNITY

## 2024-07-19 RX ORDER — LIDOCAINE 40 MG/G
CREAM TOPICAL PRN
Status: DISCONTINUED | OUTPATIENT
Start: 2024-07-19 | End: 2024-07-26 | Stop reason: HOSPADM

## 2024-07-19 RX ORDER — ONDANSETRON 2 MG/ML
4 INJECTION INTRAMUSCULAR; INTRAVENOUS EVERY 6 HOURS PRN
Status: DISCONTINUED | OUTPATIENT
Start: 2024-07-19 | End: 2024-07-20

## 2024-07-19 RX ORDER — DEXTROSE MONOHYDRATE 100 MG/ML
INJECTION, SOLUTION INTRAVENOUS CONTINUOUS PRN
Status: DISCONTINUED | OUTPATIENT
Start: 2024-07-19 | End: 2024-07-26 | Stop reason: HOSPADM

## 2024-07-19 RX ORDER — SODIUM CHLORIDE 9 MG/ML
INJECTION, SOLUTION INTRAVENOUS PRN
Status: DISCONTINUED | OUTPATIENT
Start: 2024-07-19 | End: 2024-07-26 | Stop reason: HOSPADM

## 2024-07-19 RX ORDER — SODIUM CHLORIDE 0.9 % (FLUSH) 0.9 %
10 SYRINGE (ML) INJECTION PRN
Status: DISCONTINUED | OUTPATIENT
Start: 2024-07-19 | End: 2024-07-26 | Stop reason: HOSPADM

## 2024-07-19 RX ORDER — POLYETHYLENE GLYCOL 3350 17 G/17G
17 POWDER, FOR SOLUTION ORAL DAILY PRN
Status: DISCONTINUED | OUTPATIENT
Start: 2024-07-19 | End: 2024-07-26 | Stop reason: HOSPADM

## 2024-07-19 RX ORDER — HEPARIN SODIUM 5000 [USP'U]/ML
5000 INJECTION, SOLUTION INTRAVENOUS; SUBCUTANEOUS EVERY 8 HOURS SCHEDULED
Status: DISCONTINUED | OUTPATIENT
Start: 2024-07-19 | End: 2024-07-26 | Stop reason: HOSPADM

## 2024-07-19 RX ORDER — ONDANSETRON 4 MG/1
4 TABLET, ORALLY DISINTEGRATING ORAL EVERY 8 HOURS PRN
Status: DISCONTINUED | OUTPATIENT
Start: 2024-07-19 | End: 2024-07-20

## 2024-07-19 RX ORDER — LANOLIN ALCOHOL/MO/W.PET/CERES
10 CREAM (GRAM) TOPICAL NIGHTLY PRN
Status: DISCONTINUED | OUTPATIENT
Start: 2024-07-19 | End: 2024-07-26 | Stop reason: HOSPADM

## 2024-07-19 RX ORDER — INSULIN LISPRO 100 [IU]/ML
0-4 INJECTION, SOLUTION INTRAVENOUS; SUBCUTANEOUS
Status: DISCONTINUED | OUTPATIENT
Start: 2024-07-19 | End: 2024-07-21

## 2024-07-19 RX ORDER — LIDOCAINE AND PRILOCAINE 25; 25 MG/G; MG/G
CREAM TOPICAL PRN
Status: DISCONTINUED | OUTPATIENT
Start: 2024-07-19 | End: 2024-07-19

## 2024-07-19 RX ADMIN — HEPARIN SODIUM 5000 UNITS: 5000 INJECTION INTRAVENOUS; SUBCUTANEOUS at 23:47

## 2024-07-19 RX ADMIN — SODIUM CHLORIDE, PRESERVATIVE FREE 10 ML: 5 INJECTION INTRAVENOUS at 21:35

## 2024-07-19 RX ADMIN — MIDODRINE HYDROCHLORIDE 10 MG: 10 TABLET ORAL at 21:58

## 2024-07-19 ASSESSMENT — PAIN DESCRIPTION - PAIN TYPE: TYPE: ACUTE PAIN;CHRONIC PAIN

## 2024-07-19 ASSESSMENT — ENCOUNTER SYMPTOMS
NAUSEA: 0
SORE THROAT: 0
VOMITING: 0
BACK PAIN: 0
ABDOMINAL PAIN: 0
EYE PAIN: 0
COUGH: 0
DIARRHEA: 0
SHORTNESS OF BREATH: 1
RHINORRHEA: 0

## 2024-07-19 ASSESSMENT — PAIN DESCRIPTION - DESCRIPTORS: DESCRIPTORS: PRESSURE

## 2024-07-19 ASSESSMENT — PAIN SCALES - GENERAL: PAINLEVEL_OUTOF10: 5

## 2024-07-19 ASSESSMENT — PAIN - FUNCTIONAL ASSESSMENT: PAIN_FUNCTIONAL_ASSESSMENT: 0-10

## 2024-07-19 ASSESSMENT — PAIN DESCRIPTION - LOCATION: LOCATION: CHEST

## 2024-07-19 NOTE — ED NOTES
[facility] and spoke with DEDE Horan about initiating transfer to [facility] - will call back when hospitalist is on the line

## 2024-07-19 NOTE — H&P
[facility]  Office: [phone]  Laith Shirley DO, Eric Nicholson DO, Madhu Rowland DO, Sergio Gill DO, Dinh Graham MD, Mirela Knowles MD, Wilmer Francois MD, Nancy Bullock MD,  Shaw Correa MD, Heladio Sanchez MD, Genaro Hoyt MD,  Gini Humphrey DO, Franklin Anthony MD, Ang Jorgensen MD, Van Shirley DO, Richa Godinez MD,  Rakesh Curtis DO, Quiana Soares MD, Liset Reyez MD, Irlanda Knight MD, Anders Call MD,  Anirudh Dias MD, Charissa Driver MD, John Bell MD, Donnie Mancilla MD, Yinka Caputo MD, Ehsan Patel MD, Marcelino De La Vega DO, Benny Estrada DO, Jenny Hawley MD,  Bao Torres MD, Shirley Waterhouse, CNP,  Christine Boss CNP, Adrián Smyth, CNP,  Estefany Stallings, ARIEL, Viji Velazqeuz, CNP, Carla Rae, CNP, Oneyda Marsh CNP, Danielle Gamble, CNP, Katherin Brewer, PA-C, Sarita Prakash PA-C, Renuka Alba, CNP, Regine Snow, CNP, Mesha Singh, CNP, Megan Carpio, CNP, Libby Massey, CNP, Pat Lombardo, CNS, Karla Gutiérrez, CNP, Ami Laguerre CNP, Tracy Schwab, CNP         [facility]   IN-PATIENT SERVICE   [facility]    HISTORY AND PHYSICAL EXAMINATION            Date:   7/20/2024  Patient name:  Estefany Garcia  Date of admission:  7/19/2024  6:28 PM  MRN:   [id number]  Account:  [id number]  YOB: 1960  PCP:    Zulma Payne APRN - CNP  Room:   2032/2032-01  Code Status:    Full Code    Chief Complaint:     No chief complaint on file.  Chest Pain, Shortness of breath     History Obtained From:     patient, electronic medical record    History of Present Illness:     Estefany Garcia is a 64 y.o. Non- / non  female who presents with No chief complaint on file.    Patient has an extensive cardiac, diabetic, ESRD past medical history.    Patient is transferred from [facility] due to ongoing chest pain and recent discharge from [facility] for NSTEMI.  Patient presented to ED with chest

## 2024-07-19 NOTE — ED PROVIDER NOTES
[facility] Emergency Department  [address]  [place] [address]  Phone: [phone]  Fax: [phone]    EMERGENCY DEPARTMENT ENCOUNTER          Pt Name: Estefany Garcia  MRN: [id number]  Birthdate 1960  Date of evaluation: 7/19/2024      CHIEF COMPLAINT       Chief Complaint   Patient presents with    Shortness of Breath    Chest Pain     Pt c/o shortness of breath that began around 0300am and also c/o chest pressure began around 0900.  Pt states she was at dialysis and began having chest pressure.  Pt is a M, W, F dialysis pt.  Per ems report, if workup negative, Db ok with pt returning and doing dialysis treatment.  Pt given baby asa x 3 en route by ems.       HISTORY OF PRESENT ILLNESS       Estefany Garcia is a 64 y.o. female who presents with chest pain that began this morning at 3am.  She is needing oxygen also at this time.  No acute distress.  Has renal failure.  Had cardiac stents placed in Feb of this year.  Had a recent NSTEMI also.  She is due for dialysis today.  Denies other symptoms currently.  She is awake and conversive.      REVIEW OF SYSTEMS       Review of Systems   Constitutional:  Negative for chills, fatigue and fever.   HENT:  Negative for rhinorrhea and sore throat.    Eyes:  Negative for pain.   Respiratory:  Positive for shortness of breath. Negative for cough.    Cardiovascular:  Positive for chest pain.   Gastrointestinal:  Negative for abdominal pain, diarrhea, nausea and vomiting.   Genitourinary:  Negative for difficulty urinating.   Musculoskeletal:  Negative for back pain and neck pain.   Skin:  Negative for rash.   Neurological:  Negative for weakness and headaches.        PAST MEDICAL HISTORY    has a past medical history of Arthritis, Backache, unspecified, CAD (coronary artery disease), Cerebral artery occlusion with cerebral infarction (HCC), CHF (congestive heart failure) (HCC), Chronic kidney disease, Coronary

## 2024-07-20 LAB
ANION GAP SERPL CALCULATED.3IONS-SCNC: 11 MMOL/L (ref 9–17)
BASOPHILS # BLD: 0.07 K/UL (ref 0–0.2)
BASOPHILS NFR BLD: 1 % (ref 0–2)
BNP SERPL-MCNC: 4600 PG/ML
BUN SERPL-MCNC: 19 MG/DL (ref 8–23)
BUN/CREAT SERPL: 6 (ref 9–20)
CALCIUM SERPL-MCNC: 9.2 MG/DL (ref 8.6–10.4)
CHLORIDE SERPL-SCNC: 101 MMOL/L (ref 98–107)
CO2 SERPL-SCNC: 26 MMOL/L (ref 20–31)
CREAT SERPL-MCNC: 3.1 MG/DL (ref 0.5–0.9)
EKG ATRIAL RATE: 78 BPM
EKG P AXIS: 69 DEGREES
EKG P-R INTERVAL: 176 MS
EKG Q-T INTERVAL: 424 MS
EKG QRS DURATION: 100 MS
EKG QTC CALCULATION (BAZETT): 483 MS
EKG R AXIS: 88 DEGREES
EKG T AXIS: 157 DEGREES
EKG VENTRICULAR RATE: 78 BPM
EOSINOPHIL # BLD: 0.2 K/UL (ref 0–0.44)
EOSINOPHILS RELATIVE PERCENT: 3 % (ref 1–4)
ERYTHROCYTE [DISTWIDTH] IN BLOOD BY AUTOMATED COUNT: 18.5 % (ref 11.8–14.4)
GFR, ESTIMATED: 16 ML/MIN/1.73M2
GLUCOSE BLD-MCNC: 316 MG/DL (ref 65–105)
GLUCOSE BLD-MCNC: 403 MG/DL (ref 65–105)
GLUCOSE BLD-MCNC: 419 MG/DL (ref 65–105)
GLUCOSE BLD-MCNC: 436 MG/DL (ref 65–105)
GLUCOSE SERPL-MCNC: 279 MG/DL (ref 70–99)
HCT VFR BLD AUTO: 29.8 % (ref 36.3–47.1)
HGB BLD-MCNC: 9.8 G/DL (ref 11.9–15.1)
IMM GRANULOCYTES # BLD AUTO: 0 K/UL (ref 0–0.3)
IMM GRANULOCYTES NFR BLD: 0 %
INR PPP: 1.1
IRON SATN MFR SERPL: 57 % (ref 20–55)
IRON SERPL-MCNC: 136 UG/DL (ref 37–145)
LYMPHOCYTES NFR BLD: 1.16 K/UL (ref 1.1–3.7)
LYMPHOCYTES RELATIVE PERCENT: 17 % (ref 24–43)
MCH RBC QN AUTO: 37.8 PG (ref 25.2–33.5)
MCHC RBC AUTO-ENTMCNC: 32.9 G/DL (ref 28.4–34.8)
MCV RBC AUTO: 115.1 FL (ref 82.6–102.9)
MONOCYTES NFR BLD: 0.41 K/UL (ref 0.1–1.2)
MONOCYTES NFR BLD: 6 % (ref 3–12)
MORPHOLOGY: ABNORMAL
NEUTROPHILS NFR BLD: 73 % (ref 36–65)
NEUTS SEG NFR BLD: 4.96 K/UL (ref 1.5–8.1)
NRBC BLD-RTO: 0 PER 100 WBC
PHOSPHATE SERPL-MCNC: 3.2 MG/DL (ref 2.6–4.5)
PLATELET # BLD AUTO: 178 K/UL (ref 138–453)
PMV BLD AUTO: 10.5 FL (ref 8.1–13.5)
POTASSIUM SERPL-SCNC: 4.4 MMOL/L (ref 3.7–5.3)
PROTHROMBIN TIME: 13.9 SEC (ref 11.5–14.2)
RBC # BLD AUTO: 2.59 M/UL (ref 3.95–5.11)
SODIUM SERPL-SCNC: 138 MMOL/L (ref 135–144)
TIBC SERPL-MCNC: 237 UG/DL (ref 250–450)
TROPONIN I SERPL HS-MCNC: 212 NG/L (ref 0–14)
UNSATURATED IRON BINDING CAPACITY: 101 UG/DL (ref 112–347)
WBC OTHER # BLD: 6.8 K/UL (ref 3.5–11.3)

## 2024-07-20 PROCEDURE — 36415 COLL VENOUS BLD VENIPUNCTURE: CPT

## 2024-07-20 PROCEDURE — 85025 COMPLETE CBC W/AUTO DIFF WBC: CPT

## 2024-07-20 PROCEDURE — 83550 IRON BINDING TEST: CPT

## 2024-07-20 PROCEDURE — 99223 1ST HOSP IP/OBS HIGH 75: CPT | Performed by: INTERNAL MEDICINE

## 2024-07-20 PROCEDURE — 85610 PROTHROMBIN TIME: CPT

## 2024-07-20 PROCEDURE — 80048 BASIC METABOLIC PNL TOTAL CA: CPT

## 2024-07-20 PROCEDURE — 82947 ASSAY GLUCOSE BLOOD QUANT: CPT

## 2024-07-20 PROCEDURE — 94761 N-INVAS EAR/PLS OXIMETRY MLT: CPT

## 2024-07-20 PROCEDURE — 99232 SBSQ HOSP IP/OBS MODERATE 35: CPT | Performed by: INTERNAL MEDICINE

## 2024-07-20 PROCEDURE — G0378 HOSPITAL OBSERVATION PER HR: HCPCS

## 2024-07-20 PROCEDURE — 6370000000 HC RX 637 (ALT 250 FOR IP): Performed by: NURSE PRACTITIONER

## 2024-07-20 PROCEDURE — 2580000003 HC RX 258: Performed by: NURSE PRACTITIONER

## 2024-07-20 PROCEDURE — 84484 ASSAY OF TROPONIN QUANT: CPT

## 2024-07-20 PROCEDURE — 83540 ASSAY OF IRON: CPT

## 2024-07-20 PROCEDURE — 83880 ASSAY OF NATRIURETIC PEPTIDE: CPT

## 2024-07-20 PROCEDURE — 84100 ASSAY OF PHOSPHORUS: CPT

## 2024-07-20 PROCEDURE — 6370000000 HC RX 637 (ALT 250 FOR IP)

## 2024-07-20 PROCEDURE — 6360000002 HC RX W HCPCS: Performed by: NURSE PRACTITIONER

## 2024-07-20 PROCEDURE — 96372 THER/PROPH/DIAG INJ SC/IM: CPT

## 2024-07-20 PROCEDURE — 2060000000 HC ICU INTERMEDIATE R&B

## 2024-07-20 PROCEDURE — 2700000000 HC OXYGEN THERAPY PER DAY

## 2024-07-20 RX ORDER — NITROGLYCERIN 20 MG/100ML
5-200 INJECTION INTRAVENOUS CONTINUOUS PRN
Status: DISCONTINUED | OUTPATIENT
Start: 2024-07-20 | End: 2024-07-26

## 2024-07-20 RX ORDER — INSULIN LISPRO 100 [IU]/ML
15 INJECTION, SOLUTION INTRAVENOUS; SUBCUTANEOUS ONCE
Status: COMPLETED | OUTPATIENT
Start: 2024-07-20 | End: 2024-07-20

## 2024-07-20 RX ORDER — PRASUGREL 10 MG/1
10 TABLET, FILM COATED ORAL DAILY
Status: DISCONTINUED | OUTPATIENT
Start: 2024-07-20 | End: 2024-07-26 | Stop reason: HOSPADM

## 2024-07-20 RX ORDER — CARVEDILOL 3.12 MG/1
3.12 TABLET ORAL 2 TIMES DAILY
Status: DISCONTINUED | OUTPATIENT
Start: 2024-07-20 | End: 2024-07-26 | Stop reason: HOSPADM

## 2024-07-20 RX ORDER — INSULIN GLARGINE 100 [IU]/ML
42 INJECTION, SOLUTION SUBCUTANEOUS NIGHTLY
Status: DISCONTINUED | OUTPATIENT
Start: 2024-07-20 | End: 2024-07-26 | Stop reason: HOSPADM

## 2024-07-20 RX ORDER — ATORVASTATIN CALCIUM 80 MG/1
80 TABLET, FILM COATED ORAL NIGHTLY
Status: DISCONTINUED | OUTPATIENT
Start: 2024-07-20 | End: 2024-07-26 | Stop reason: HOSPADM

## 2024-07-20 RX ORDER — INSULIN LISPRO 100 [IU]/ML
6 INJECTION, SOLUTION INTRAVENOUS; SUBCUTANEOUS ONCE
Status: COMPLETED | OUTPATIENT
Start: 2024-07-20 | End: 2024-07-20

## 2024-07-20 RX ORDER — NITROGLYCERIN 20 MG/100ML
5-200 INJECTION INTRAVENOUS CONTINUOUS
Status: DISCONTINUED | OUTPATIENT
Start: 2024-07-20 | End: 2024-07-20

## 2024-07-20 RX ORDER — ASPIRIN 81 MG/1
81 TABLET, CHEWABLE ORAL DAILY
Status: DISCONTINUED | OUTPATIENT
Start: 2024-07-20 | End: 2024-07-26 | Stop reason: HOSPADM

## 2024-07-20 RX ADMIN — INSULIN GLARGINE 42 UNITS: 100 INJECTION, SOLUTION SUBCUTANEOUS at 22:20

## 2024-07-20 RX ADMIN — ATORVASTATIN CALCIUM 80 MG: 80 TABLET, FILM COATED ORAL at 20:07

## 2024-07-20 RX ADMIN — CARVEDILOL 3.12 MG: 3.12 TABLET, FILM COATED ORAL at 10:07

## 2024-07-20 RX ADMIN — Medication 10.5 MG: at 22:28

## 2024-07-20 RX ADMIN — INSULIN LISPRO 6 UNITS: 100 INJECTION, SOLUTION INTRAVENOUS; SUBCUTANEOUS at 20:01

## 2024-07-20 RX ADMIN — HEPARIN SODIUM 5000 UNITS: 5000 INJECTION INTRAVENOUS; SUBCUTANEOUS at 20:07

## 2024-07-20 RX ADMIN — SODIUM CHLORIDE, PRESERVATIVE FREE 10 ML: 5 INJECTION INTRAVENOUS at 10:09

## 2024-07-20 RX ADMIN — INSULIN LISPRO 15 UNITS: 100 INJECTION, SOLUTION INTRAVENOUS; SUBCUTANEOUS at 22:19

## 2024-07-20 RX ADMIN — HEPARIN SODIUM 5000 UNITS: 5000 INJECTION INTRAVENOUS; SUBCUTANEOUS at 06:19

## 2024-07-20 RX ADMIN — ASPIRIN 81 MG CHEWABLE TABLET 81 MG: 81 TABLET CHEWABLE at 10:07

## 2024-07-20 RX ADMIN — INSULIN LISPRO 3 UNITS: 100 INJECTION, SOLUTION INTRAVENOUS; SUBCUTANEOUS at 18:07

## 2024-07-20 RX ADMIN — CARVEDILOL 3.12 MG: 3.12 TABLET, FILM COATED ORAL at 21:53

## 2024-07-20 RX ADMIN — INSULIN LISPRO 3 UNITS: 100 INJECTION, SOLUTION INTRAVENOUS; SUBCUTANEOUS at 10:08

## 2024-07-20 RX ADMIN — Medication 10.5 MG: at 01:27

## 2024-07-20 RX ADMIN — PRASUGREL 10 MG: 10 TABLET, FILM COATED ORAL at 10:07

## 2024-07-20 RX ADMIN — INSULIN LISPRO 4 UNITS: 100 INJECTION, SOLUTION INTRAVENOUS; SUBCUTANEOUS at 20:00

## 2024-07-20 RX ADMIN — SODIUM CHLORIDE, PRESERVATIVE FREE 10 ML: 5 INJECTION INTRAVENOUS at 20:05

## 2024-07-20 NOTE — CONSULTS
Nephrology ESRD Consult Note    Reason for Consult:  End stage renal disease, dialysis management, fluid and electrolyte management  Requesting Physician:  Kashif    Chief Complaint: Chest pain  History Obtained From:  patient, electronic medical record    History of Present Illness:              This is a 64 y.o. female with end stage renal disease on hemodialysis [facility] at [facility] dialysis under Dr. Jurado who presents with chest pain.  Patient was brought into the hospital after suffering chest pressure at [facility] that she says lasted about 1 hour.  She did have a recent cardiac catheterization and stents placed in February 2024.  She has a history of myocardial infarction and CABG as well.  She has type 2 diabetes mellitus, hypertension and history of CHF with preserved LVEF.  She does take on Bumex in addition to her dialysis care.  She is currently on contact isolation.  Patient does have chronically low blood pressures and her current systolic blood pressure in the 70s.  The patient awake alert mentating appropriately.     She tolerated dialysis yesterday well.  She does not have any chest pain currently.  She did not have any chest pain with dialysis.  No significant swelling of the extremities.  No issues with AV fistula function.  She does not have any chest pain shortness of breath or nausea or vomiting this morning.  There is consideration for possible discharge.  Troponin was 203 yesterday morning went down to 186 and then back up to 212 today.  proBNP 4600.  I do not see a note from cardiology.     Labs today showed sodium 138 with potassium 4.4 chloride 101 and CO2 26 with glucose 279 calcium 9.2 with phosphorus 3.2.  White blood cells are 6.8 with hemoglobin 9.8 and platelet count of 178.  She does have macrocytosis.     Past medical history and past surgical history reviewed below.     She has been living at [facility] after discharge following a fall 
Echo:  02/01/24    ECHO (TTE) COMPLETE (PRN CONTRAST/BUBBLE/STRAIN/3D) 02/02/2024  9:47 AM (Final)    Interpretation Summary    Left Ventricle: Normal left ventricular systolic function with a visually estimated EF of 55 - 60%. Left ventricle size is normal. Mild septal thickening. Normal wall motion. Abnormal diastolic function. Average E/e' ratio is 17.97.    Aorta: Normal sized ascending aorta. Dilated aortic root. Ao root diameter is 2.9 cm.    Image quality is fair.    Signed by: Rustam Katz DO on 2/2/2024  9:47 AM      Last Cath:  02/28/24    CARDIAC PROCEDURE 02/28/2024  6:13 PM (Final)    Conclusion  Severe native vessel CAD.  Patent LIMA-LAD. Patent SVG-Diagonal with patent stent.  RCA 70% mid ISR reduced to 0% using high pressure 3 mm NC balloon PTCA. RPDA has 95% ISR reduced to 0% using high pressure 2.25 NC balloon PTCA.    Signed by: Jairo Hurley MD on 2/28/2024  6:13 PM      Last Stress:  02/23/24    NM STRESS TEST WITH MYOCARDIAL PERFUSION 02/27/2024 11:02 AM, 02/27/2024  4:14 PM (Final)    Interpretation Summary    ECG: Resting ECG demonstrates normal sinus rhythm.    ECG: The ECG was not diagnostic due to resting ST-T abnormalities.    Stress Test: A pharmacological stress test was performed using lexiscan.    Stress ECG: Arrhythmias during stress: PACs. The ECG was not diagnostic due to resting ST-T abnormalities.    EXAMINATION:  MYOCARDIAL PERFUSION IMAGING    [2/27/2024 11:01 am]    TECHNIQUE:  For the rest study, [38.6] mCi of Tc-99m labeled sestamibi were injected.  SPECT images were acquired.        Under cardiology supervision, 0.4 mg Lexiscan was infused.  After pharmacologic stress, [15.5] mCi of Tc-99m labeled sestamibi were injected.  SPECT images with ECG gating were acquired.    COMPARISON:  [None Available.]    HISTORY:  [ORDERING SYSTEM PROVIDED HISTORY: ACS (< 3mo ago), symptomatic, prior angio]    []    FINDINGS:        [No prone images were acquired.    Supine stress imaging

## 2024-07-20 NOTE — DISCHARGE SUMMARY
bicarbonate 650 MG tablet  Take 2 tablets by mouth twice daily     venlafaxine 75 MG extended release capsule  Commonly known as: EFFEXOR XR  TAKE 1 CAPSULE BY MOUTH ONCE DAILY WITH BREAKFAST            STOP taking these medications      HYDROcodone-acetaminophen 5-325 MG per tablet  Commonly known as: NORCO              No discharge procedures on file.    Time Spent on discharge is 28 min in patient examination, evaluation, counseling as well as medication reconciliation, prescriptions for required medications, discharge plan and follow up.    Electronically signed by   Madhu Rowland DO  7/20/2024  1:07 PM      Thank you Zulma Ely, APRN - CNP for the opportunity to be involved in this patient's care.

## 2024-07-20 NOTE — FLOWSHEET NOTE
07/20/24 0730   Treatment Team Notification   Reason for Communication Evaluate   Name of Team Member Notified Orlop   Treatment Team Role Attending Provider   Method of Communication Face to face   Response In department     MD on unit for rounds, updated by RN  Considering history and improvement in symptoms s/p HD, likely discharge today pending nephrology and cardiology clearance

## 2024-07-20 NOTE — DISCHARGE INSTR - COC
Continuity of Care Form    Patient Name: Estefany Garcia   :  1960  MRN:  [id number]    Admit date:  2024  Discharge date:  24    Code Status Order: Full Code   Advance Directives:     Admitting Physician:  Madhu Rowland DO  PCP: Zulma Payne, APRN - CNP    Discharging Nurse: Reji PARIS RN  Discharging Hospital Unit/Room#:   Discharging Unit Phone Number: [phone]    Emergency Contact:   Extended Emergency Contact Information  Primary Emergency Contact: Hailey Guido  Home Phone: [phone]  Relation: Brother/Sister  Secondary Emergency Contact: Nancy Perdomo  Home Phone: [phone]  Relation: Brother/Sister    Past Surgical History:  Past Surgical History:   Procedure Laterality Date    ANKLE FRACTURE SURGERY      AV FISTULA CREATION  2021    BACK SURGERY      BREAST REDUCTION SURGERY      BREAST SURGERY      CARDIAC CATHETERIZATION      MVD  /  CT CONSULT    CARDIAC PROCEDURE N/A 2023    ron / Coronary angiography / op scmh performed by Jairo Ceja MD at [facility] [place] CARDIAC CATH LAB    CARDIAC PROCEDURE N/A 11/15/2023    ron / Percutaneous coronary intervention performed by Jairo Ceja MD at [facility] [place] CARDIAC CATH LAB    CARDIAC PROCEDURE N/A 2024    ron / Left heart cath / op scmh performed by Jairo Ceja MD at [facility] [place] CARDIAC CATH LAB    CARDIAC PROCEDURE N/A 2024    Percutaneous coronary intervention performed by Jairo Ceja MD at [facility] [place] CARDIAC CATH LAB    CARDIAC SURGERY      CARPAL TUNNEL RELEASE      CHOLECYSTECTOMY, OPEN N/A     COLONOSCOPY      CORONARY ANGIOPLASTY WITH STENT PLACEMENT  2023    PTCA / FADIA RCA    CORONARY ANGIOPLASTY WITH STENT PLACEMENT  2019    STENT X1 AT [facility]    CORONARY ANGIOPLASTY WITH STENT PLACEMENT  2023    DR CEJA  /  FADIA SVG-DIAG  /  NEEDS RCA DONE    CORONARY ARTERY BYPASS GRAFT  02/2005    X3 [facility]    DIALYSIS CATHETER INSERTION Right 2023    [facility] CATHETER

## 2024-07-20 NOTE — FLOWSHEET NOTE
07/20/24 0800   Treatment Team Notification   Reason for Communication Evaluate   Name of Team Member Notified Savage   Treatment Team Role Consulting Provider   Method of Communication Face to face   Response At bedside     MD at bedside for rounds  Hypotension reviewed, no need for intervention as pt is close to baseline  No objection to discharge from renal standpoint

## 2024-07-20 NOTE — FLOWSHEET NOTE
07/20/24 1845   Treatment Team Notification   Reason for Communication Medication concern   Name of Team Member Notified Orlop   Treatment Team Role Attending Provider   Method of Communication Secure Message     Notified of need for prior auth-   Currently missing multiple home medications as patient was discharged early in the day  BG >300 throughout shift, not ordered home Lantus  On low dose coverage

## 2024-07-21 LAB
ANION GAP SERPL CALCULATED.3IONS-SCNC: 13 MMOL/L (ref 9–17)
BASOPHILS # BLD: 0.04 K/UL (ref 0–0.2)
BASOPHILS NFR BLD: 1 % (ref 0–2)
BUN SERPL-MCNC: 33 MG/DL (ref 8–23)
BUN/CREAT SERPL: 8 (ref 9–20)
CALCIUM SERPL-MCNC: 9 MG/DL (ref 8.6–10.4)
CHLORIDE SERPL-SCNC: 96 MMOL/L (ref 98–107)
CO2 SERPL-SCNC: 24 MMOL/L (ref 20–31)
CREAT SERPL-MCNC: 4.2 MG/DL (ref 0.5–0.9)
EOSINOPHIL # BLD: 0.22 K/UL (ref 0–0.44)
EOSINOPHILS RELATIVE PERCENT: 4 % (ref 1–4)
ERYTHROCYTE [DISTWIDTH] IN BLOOD BY AUTOMATED COUNT: 17.8 % (ref 11.8–14.4)
GFR, ESTIMATED: 11 ML/MIN/1.73M2
GLUCOSE BLD-MCNC: 248 MG/DL (ref 65–105)
GLUCOSE BLD-MCNC: 262 MG/DL (ref 65–105)
GLUCOSE BLD-MCNC: 296 MG/DL (ref 65–105)
GLUCOSE SERPL-MCNC: 252 MG/DL (ref 70–99)
HCT VFR BLD AUTO: 28.1 % (ref 36.3–47.1)
HGB BLD-MCNC: 9.1 G/DL (ref 11.9–15.1)
IMM GRANULOCYTES # BLD AUTO: 0.03 K/UL (ref 0–0.3)
IMM GRANULOCYTES NFR BLD: 1 %
LYMPHOCYTES NFR BLD: 1.52 K/UL (ref 1.1–3.7)
LYMPHOCYTES RELATIVE PERCENT: 26 % (ref 24–43)
MCH RBC QN AUTO: 38.4 PG (ref 25.2–33.5)
MCHC RBC AUTO-ENTMCNC: 32.4 G/DL (ref 28.4–34.8)
MCV RBC AUTO: 118.6 FL (ref 82.6–102.9)
MONOCYTES NFR BLD: 0.45 K/UL (ref 0.1–1.2)
MONOCYTES NFR BLD: 8 % (ref 3–12)
NEUTROPHILS NFR BLD: 62 % (ref 36–65)
NEUTS SEG NFR BLD: 3.68 K/UL (ref 1.5–8.1)
NRBC BLD-RTO: 0.3 PER 100 WBC
PLATELET # BLD AUTO: 155 K/UL (ref 138–453)
PMV BLD AUTO: 10.7 FL (ref 8.1–13.5)
POTASSIUM SERPL-SCNC: 4.4 MMOL/L (ref 3.7–5.3)
RBC # BLD AUTO: 2.37 M/UL (ref 3.95–5.11)
RBC # BLD: ABNORMAL 10*6/UL
RBC # BLD: ABNORMAL 10*6/UL
SODIUM SERPL-SCNC: 133 MMOL/L (ref 135–144)
WBC OTHER # BLD: 5.9 K/UL (ref 3.5–11.3)

## 2024-07-21 PROCEDURE — 85025 COMPLETE CBC W/AUTO DIFF WBC: CPT

## 2024-07-21 PROCEDURE — 93005 ELECTROCARDIOGRAM TRACING: CPT | Performed by: INTERNAL MEDICINE

## 2024-07-21 PROCEDURE — 97530 THERAPEUTIC ACTIVITIES: CPT

## 2024-07-21 PROCEDURE — 97162 PT EVAL MOD COMPLEX 30 MIN: CPT

## 2024-07-21 PROCEDURE — 6370000000 HC RX 637 (ALT 250 FOR IP): Performed by: NURSE PRACTITIONER

## 2024-07-21 PROCEDURE — 97166 OT EVAL MOD COMPLEX 45 MIN: CPT

## 2024-07-21 PROCEDURE — G0378 HOSPITAL OBSERVATION PER HR: HCPCS

## 2024-07-21 PROCEDURE — 6370000000 HC RX 637 (ALT 250 FOR IP)

## 2024-07-21 PROCEDURE — 2580000003 HC RX 258: Performed by: NURSE PRACTITIONER

## 2024-07-21 PROCEDURE — 2060000000 HC ICU INTERMEDIATE R&B

## 2024-07-21 PROCEDURE — 97535 SELF CARE MNGMENT TRAINING: CPT

## 2024-07-21 PROCEDURE — 82947 ASSAY GLUCOSE BLOOD QUANT: CPT

## 2024-07-21 PROCEDURE — 99232 SBSQ HOSP IP/OBS MODERATE 35: CPT | Performed by: INTERNAL MEDICINE

## 2024-07-21 PROCEDURE — 80048 BASIC METABOLIC PNL TOTAL CA: CPT

## 2024-07-21 PROCEDURE — 6370000000 HC RX 637 (ALT 250 FOR IP): Performed by: INTERNAL MEDICINE

## 2024-07-21 PROCEDURE — 6360000002 HC RX W HCPCS: Performed by: NURSE PRACTITIONER

## 2024-07-21 PROCEDURE — 96372 THER/PROPH/DIAG INJ SC/IM: CPT

## 2024-07-21 PROCEDURE — 36415 COLL VENOUS BLD VENIPUNCTURE: CPT

## 2024-07-21 PROCEDURE — 94761 N-INVAS EAR/PLS OXIMETRY MLT: CPT

## 2024-07-21 PROCEDURE — 2700000000 HC OXYGEN THERAPY PER DAY

## 2024-07-21 RX ORDER — BUSPIRONE HYDROCHLORIDE 10 MG/1
10 TABLET ORAL 3 TIMES DAILY
Status: DISCONTINUED | OUTPATIENT
Start: 2024-07-21 | End: 2024-07-26 | Stop reason: HOSPADM

## 2024-07-21 RX ORDER — INSULIN GLARGINE 100 [IU]/ML
72 INJECTION, SOLUTION SUBCUTANEOUS DAILY
Status: DISCONTINUED | OUTPATIENT
Start: 2024-07-21 | End: 2024-07-26 | Stop reason: HOSPADM

## 2024-07-21 RX ORDER — POTASSIUM CHLORIDE 750 MG/1
10 CAPSULE, EXTENDED RELEASE ORAL 2 TIMES DAILY
Status: DISCONTINUED | OUTPATIENT
Start: 2024-07-21 | End: 2024-07-26 | Stop reason: HOSPADM

## 2024-07-21 RX ORDER — FERROUS SULFATE 325(65) MG
325 TABLET, DELAYED RELEASE (ENTERIC COATED) ORAL DAILY
Status: DISCONTINUED | OUTPATIENT
Start: 2024-07-21 | End: 2024-07-26 | Stop reason: HOSPADM

## 2024-07-21 RX ORDER — SODIUM BICARBONATE 650 MG/1
1300 TABLET ORAL 2 TIMES DAILY
Status: DISCONTINUED | OUTPATIENT
Start: 2024-07-21 | End: 2024-07-26 | Stop reason: HOSPADM

## 2024-07-21 RX ORDER — MIDODRINE HYDROCHLORIDE 5 MG/1
5 TABLET ORAL ONCE
Status: COMPLETED | OUTPATIENT
Start: 2024-07-21 | End: 2024-07-21

## 2024-07-21 RX ORDER — INSULIN LISPRO 100 [IU]/ML
0-4 INJECTION, SOLUTION INTRAVENOUS; SUBCUTANEOUS NIGHTLY
Status: DISCONTINUED | OUTPATIENT
Start: 2024-07-21 | End: 2024-07-26 | Stop reason: HOSPADM

## 2024-07-21 RX ORDER — INSULIN LISPRO 100 [IU]/ML
20 INJECTION, SOLUTION INTRAVENOUS; SUBCUTANEOUS
Status: DISCONTINUED | OUTPATIENT
Start: 2024-07-21 | End: 2024-07-26 | Stop reason: HOSPADM

## 2024-07-21 RX ORDER — INSULIN LISPRO 100 [IU]/ML
0-8 INJECTION, SOLUTION INTRAVENOUS; SUBCUTANEOUS
Status: DISCONTINUED | OUTPATIENT
Start: 2024-07-21 | End: 2024-07-26 | Stop reason: HOSPADM

## 2024-07-21 RX ORDER — BUMETANIDE 1 MG/1
3 TABLET ORAL 2 TIMES DAILY
Status: DISCONTINUED | OUTPATIENT
Start: 2024-07-21 | End: 2024-07-26

## 2024-07-21 RX ORDER — CYCLOBENZAPRINE HCL 5 MG
5 TABLET ORAL 3 TIMES DAILY PRN
Status: DISCONTINUED | OUTPATIENT
Start: 2024-07-21 | End: 2024-07-26 | Stop reason: HOSPADM

## 2024-07-21 RX ORDER — DOCUSATE SODIUM 100 MG/1
100 CAPSULE, LIQUID FILLED ORAL 2 TIMES DAILY
Status: DISCONTINUED | OUTPATIENT
Start: 2024-07-21 | End: 2024-07-26 | Stop reason: HOSPADM

## 2024-07-21 RX ORDER — ALLOPURINOL 100 MG/1
100 TABLET ORAL DAILY
Status: DISCONTINUED | OUTPATIENT
Start: 2024-07-21 | End: 2024-07-26 | Stop reason: HOSPADM

## 2024-07-21 RX ORDER — VENLAFAXINE HYDROCHLORIDE 75 MG/1
75 CAPSULE, EXTENDED RELEASE ORAL
Status: DISCONTINUED | OUTPATIENT
Start: 2024-07-21 | End: 2024-07-26 | Stop reason: HOSPADM

## 2024-07-21 RX ADMIN — BUSPIRONE HYDROCHLORIDE 10 MG: 10 TABLET ORAL at 21:32

## 2024-07-21 RX ADMIN — INSULIN LISPRO 8 UNITS: 100 INJECTION, SOLUTION INTRAVENOUS; SUBCUTANEOUS at 13:18

## 2024-07-21 RX ADMIN — INSULIN LISPRO 20 UNITS: 100 INJECTION, SOLUTION INTRAVENOUS; SUBCUTANEOUS at 13:17

## 2024-07-21 RX ADMIN — ATORVASTATIN CALCIUM 80 MG: 80 TABLET, FILM COATED ORAL at 21:30

## 2024-07-21 RX ADMIN — DOCUSATE SODIUM 100 MG: 100 CAPSULE, LIQUID FILLED ORAL at 09:51

## 2024-07-21 RX ADMIN — ASPIRIN 81 MG CHEWABLE TABLET 81 MG: 81 TABLET CHEWABLE at 08:33

## 2024-07-21 RX ADMIN — BUSPIRONE HYDROCHLORIDE 10 MG: 10 TABLET ORAL at 09:51

## 2024-07-21 RX ADMIN — PRASUGREL 10 MG: 10 TABLET, FILM COATED ORAL at 09:55

## 2024-07-21 RX ADMIN — SODIUM BICARBONATE 1300 MG: 650 TABLET ORAL at 09:55

## 2024-07-21 RX ADMIN — SODIUM BICARBONATE 1300 MG: 650 TABLET ORAL at 21:29

## 2024-07-21 RX ADMIN — POTASSIUM CHLORIDE 10 MEQ: 750 CAPSULE, EXTENDED RELEASE ORAL at 09:52

## 2024-07-21 RX ADMIN — BUMETANIDE 3 MG: 1 TABLET ORAL at 09:51

## 2024-07-21 RX ADMIN — POLYETHYLENE GLYCOL 3350 17 G: 17 POWDER, FOR SOLUTION ORAL at 19:04

## 2024-07-21 RX ADMIN — ALLOPURINOL 100 MG: 100 TABLET ORAL at 09:52

## 2024-07-21 RX ADMIN — Medication 10.5 MG: at 21:27

## 2024-07-21 RX ADMIN — CARVEDILOL 3.12 MG: 3.12 TABLET, FILM COATED ORAL at 21:31

## 2024-07-21 RX ADMIN — CARVEDILOL 3.12 MG: 3.12 TABLET, FILM COATED ORAL at 08:33

## 2024-07-21 RX ADMIN — SODIUM CHLORIDE, PRESERVATIVE FREE 10 ML: 5 INJECTION INTRAVENOUS at 21:41

## 2024-07-21 RX ADMIN — INSULIN GLARGINE 42 UNITS: 100 INJECTION, SOLUTION SUBCUTANEOUS at 21:32

## 2024-07-21 RX ADMIN — HEPARIN SODIUM 5000 UNITS: 5000 INJECTION INTRAVENOUS; SUBCUTANEOUS at 13:16

## 2024-07-21 RX ADMIN — INSULIN LISPRO 8 UNITS: 100 INJECTION, SOLUTION INTRAVENOUS; SUBCUTANEOUS at 17:47

## 2024-07-21 RX ADMIN — INSULIN GLARGINE 72 UNITS: 100 INJECTION, SOLUTION SUBCUTANEOUS at 11:31

## 2024-07-21 RX ADMIN — POTASSIUM CHLORIDE 10 MEQ: 750 CAPSULE, EXTENDED RELEASE ORAL at 21:30

## 2024-07-21 RX ADMIN — HEPARIN SODIUM 5000 UNITS: 5000 INJECTION INTRAVENOUS; SUBCUTANEOUS at 06:06

## 2024-07-21 RX ADMIN — VENLAFAXINE HYDROCHLORIDE 75 MG: 75 CAPSULE, EXTENDED RELEASE ORAL at 09:55

## 2024-07-21 RX ADMIN — INSULIN LISPRO 20 UNITS: 100 INJECTION, SOLUTION INTRAVENOUS; SUBCUTANEOUS at 17:48

## 2024-07-21 RX ADMIN — DOCUSATE SODIUM 100 MG: 100 CAPSULE, LIQUID FILLED ORAL at 21:29

## 2024-07-21 RX ADMIN — INSULIN LISPRO 6 UNITS: 100 INJECTION, SOLUTION INTRAVENOUS; SUBCUTANEOUS at 08:33

## 2024-07-21 RX ADMIN — FERROUS SULFATE TAB EC 325 MG (65 MG FE EQUIVALENT) 325 MG: 325 (65 FE) TABLET DELAYED RESPONSE at 09:51

## 2024-07-21 RX ADMIN — BUMETANIDE 3 MG: 1 TABLET ORAL at 21:30

## 2024-07-21 RX ADMIN — BUSPIRONE HYDROCHLORIDE 10 MG: 10 TABLET ORAL at 13:16

## 2024-07-21 RX ADMIN — HEPARIN SODIUM 5000 UNITS: 5000 INJECTION INTRAVENOUS; SUBCUTANEOUS at 21:33

## 2024-07-21 RX ADMIN — SODIUM CHLORIDE, PRESERVATIVE FREE 10 ML: 5 INJECTION INTRAVENOUS at 09:52

## 2024-07-21 RX ADMIN — MIDODRINE HYDROCHLORIDE 5 MG: 5 TABLET ORAL at 11:30

## 2024-07-21 NOTE — FLOWSHEET NOTE
07/20/24 1930 07/20/24 1947   Treatment Team Notification   Reason for Communication Evaluate;Patient/Family request Evaluate;Patient/Family request   Type of Critical Result POC test POC test   Critical Lab Information     Name of Team Member Notified Danielle Gamble NP DelGrosso, Malissa, NP   Treatment Team Role Advanced Practice Nurse Advanced Practice Nurse   Method of Communication Secure Message  (Patients BG is 436. Sliding scale orders call for 4 units and to notify physican. Patient stating she \"should have at least 20 units plus coverage\") Secure Message  (Give 4 units SS and 6 units 1x dose = 10 units)   Response Waiting for response See orders   Notification Time 1930 1947      per patient dexcom, writer verified with glucometer, gave 4u sliding scale humalog, plus one time dose 6u humalog = 10 units given at 2001.    Recheck at approx 2030,  per patient dexcom and writer verified with glucometer.

## 2024-07-21 NOTE — FLOWSHEET NOTE
07/21/24 0950   Treatment Team Notification   Reason for Communication Change in status   Name of Team Member Notified Orlop   Treatment Team Role Attending Provider   Method of Communication Secure Message   Response See orders     Pt c/o chest pressure while working with PT  Cardiology signed off  No nitro/pain mgmt ordered  Orders received for 12lead

## 2024-07-21 NOTE — FLOWSHEET NOTE
07/20/24 2143 07/20/24 2144   Treatment Team Notification   Reason for Communication Evaluate;Patient/Family request Evaluate;Patient/Family request   Type of Critical Result POC test POC test   Critical Lab Information     Name of Team Member Notified Danielle Gamble NP DelGrosso, Malissa, NP   Treatment Team Role Advanced Practice Nurse Advanced Practice Nurse   Method of Communication Secure Message  (Patient . Patient dose at home/requesting now- \"42 humalog and 20 lantus\") Secure Message   Response Waiting for response See orders  (Home dose lantus 42u and humalog 15u ordered)   Notification Time 2143 2144       Recheck approx 1 hr later (from 2030/previous note),  per patient dexcom, writer verified with glucometer.     Patient requesting \"home dose of insulin- lantus 42u and humalog 15u\" New orders acknowledged and medication given.

## 2024-07-21 NOTE — FLOWSHEET NOTE
07/21/24 1045   Treatment Team Notification   Reason for Communication Review case   Name of Team Member Notified Orlop   Treatment Team Role Attending Provider   Method of Communication Secure Message   Response See orders     Pt continues to c/o chest pressure, now dizzy, peaked  Hypotension noted  BG >350, home dose lantus not ordered    One time dose midodrine, lantus entered

## 2024-07-21 NOTE — FLOWSHEET NOTE
07/21/24 1215   Treatment Team Notification   Reason for Communication Review case   Name of Team Member Notified orlop   Treatment Team Role Attending Provider   Method of Communication Secure Message   Response See orders     BG still > 350  Updated on home meds  Home humalog regimen ordered

## 2024-07-22 LAB
ANION GAP SERPL CALCULATED.3IONS-SCNC: 12 MMOL/L (ref 9–17)
BASOPHILS # BLD: 0.06 K/UL (ref 0–0.2)
BASOPHILS NFR BLD: 1 % (ref 0–2)
BUN SERPL-MCNC: 45 MG/DL (ref 8–23)
BUN/CREAT SERPL: 9 (ref 9–20)
CALCIUM SERPL-MCNC: 8.8 MG/DL (ref 8.6–10.4)
CHLORIDE SERPL-SCNC: 98 MMOL/L (ref 98–107)
CO2 SERPL-SCNC: 24 MMOL/L (ref 20–31)
CREAT SERPL-MCNC: 4.8 MG/DL (ref 0.5–0.9)
EOSINOPHIL # BLD: 0.23 K/UL (ref 0–0.44)
EOSINOPHILS RELATIVE PERCENT: 4 % (ref 1–4)
ERYTHROCYTE [DISTWIDTH] IN BLOOD BY AUTOMATED COUNT: 17.5 % (ref 11.8–14.4)
GFR, ESTIMATED: 9 ML/MIN/1.73M2
GLUCOSE BLD-MCNC: 255 MG/DL (ref 65–105)
GLUCOSE SERPL-MCNC: 203 MG/DL (ref 70–99)
HCT VFR BLD AUTO: 27.7 % (ref 36.3–47.1)
HGB BLD-MCNC: 9.2 G/DL (ref 11.9–15.1)
IMM GRANULOCYTES # BLD AUTO: 0.06 K/UL (ref 0–0.3)
IMM GRANULOCYTES NFR BLD: 1 %
LYMPHOCYTES NFR BLD: 1.22 K/UL (ref 1.1–3.7)
LYMPHOCYTES RELATIVE PERCENT: 21 % (ref 24–43)
MCH RBC QN AUTO: 37.7 PG (ref 25.2–33.5)
MCHC RBC AUTO-ENTMCNC: 33.2 G/DL (ref 28.4–34.8)
MCV RBC AUTO: 113.5 FL (ref 82.6–102.9)
MONOCYTES NFR BLD: 0.41 K/UL (ref 0.1–1.2)
MONOCYTES NFR BLD: 7 % (ref 3–12)
MORPHOLOGY: ABNORMAL
NEUTROPHILS NFR BLD: 66 % (ref 36–65)
NEUTS SEG NFR BLD: 3.82 K/UL (ref 1.5–8.1)
NRBC BLD-RTO: 0 PER 100 WBC
PLATELET # BLD AUTO: 167 K/UL (ref 138–453)
PMV BLD AUTO: 10.7 FL (ref 8.1–13.5)
POTASSIUM SERPL-SCNC: 4.7 MMOL/L (ref 3.7–5.3)
RBC # BLD AUTO: 2.44 M/UL (ref 3.95–5.11)
SODIUM SERPL-SCNC: 134 MMOL/L (ref 135–144)
WBC OTHER # BLD: 5.8 K/UL (ref 3.5–11.3)

## 2024-07-22 PROCEDURE — 2060000000 HC ICU INTERMEDIATE R&B

## 2024-07-22 PROCEDURE — 6370000000 HC RX 637 (ALT 250 FOR IP): Performed by: NURSE PRACTITIONER

## 2024-07-22 PROCEDURE — 6360000002 HC RX W HCPCS: Performed by: NURSE PRACTITIONER

## 2024-07-22 PROCEDURE — 94761 N-INVAS EAR/PLS OXIMETRY MLT: CPT

## 2024-07-22 PROCEDURE — 90935 HEMODIALYSIS ONE EVALUATION: CPT

## 2024-07-22 PROCEDURE — G0378 HOSPITAL OBSERVATION PER HR: HCPCS

## 2024-07-22 PROCEDURE — 36415 COLL VENOUS BLD VENIPUNCTURE: CPT

## 2024-07-22 PROCEDURE — 97110 THERAPEUTIC EXERCISES: CPT

## 2024-07-22 PROCEDURE — 6370000000 HC RX 637 (ALT 250 FOR IP)

## 2024-07-22 PROCEDURE — 2700000000 HC OXYGEN THERAPY PER DAY

## 2024-07-22 PROCEDURE — 6370000000 HC RX 637 (ALT 250 FOR IP): Performed by: INTERNAL MEDICINE

## 2024-07-22 PROCEDURE — 2580000003 HC RX 258: Performed by: NURSE PRACTITIONER

## 2024-07-22 PROCEDURE — 97530 THERAPEUTIC ACTIVITIES: CPT

## 2024-07-22 PROCEDURE — 80048 BASIC METABOLIC PNL TOTAL CA: CPT

## 2024-07-22 PROCEDURE — 97116 GAIT TRAINING THERAPY: CPT

## 2024-07-22 PROCEDURE — 85025 COMPLETE CBC W/AUTO DIFF WBC: CPT

## 2024-07-22 PROCEDURE — 82947 ASSAY GLUCOSE BLOOD QUANT: CPT

## 2024-07-22 PROCEDURE — 90935 HEMODIALYSIS ONE EVALUATION: CPT | Performed by: INTERNAL MEDICINE

## 2024-07-22 PROCEDURE — 96372 THER/PROPH/DIAG INJ SC/IM: CPT

## 2024-07-22 PROCEDURE — 99232 SBSQ HOSP IP/OBS MODERATE 35: CPT | Performed by: NURSE PRACTITIONER

## 2024-07-22 RX ADMIN — ATORVASTATIN CALCIUM 80 MG: 80 TABLET, FILM COATED ORAL at 20:54

## 2024-07-22 RX ADMIN — INSULIN LISPRO 20 UNITS: 100 INJECTION, SOLUTION INTRAVENOUS; SUBCUTANEOUS at 09:10

## 2024-07-22 RX ADMIN — INSULIN LISPRO 4 UNITS: 100 INJECTION, SOLUTION INTRAVENOUS; SUBCUTANEOUS at 09:08

## 2024-07-22 RX ADMIN — POTASSIUM CHLORIDE 10 MEQ: 750 CAPSULE, EXTENDED RELEASE ORAL at 09:09

## 2024-07-22 RX ADMIN — DOCUSATE SODIUM 100 MG: 100 CAPSULE, LIQUID FILLED ORAL at 20:54

## 2024-07-22 RX ADMIN — DOCUSATE SODIUM 100 MG: 100 CAPSULE, LIQUID FILLED ORAL at 09:09

## 2024-07-22 RX ADMIN — PRASUGREL 10 MG: 10 TABLET, FILM COATED ORAL at 09:10

## 2024-07-22 RX ADMIN — BUSPIRONE HYDROCHLORIDE 10 MG: 10 TABLET ORAL at 14:32

## 2024-07-22 RX ADMIN — VENLAFAXINE HYDROCHLORIDE 75 MG: 75 CAPSULE, EXTENDED RELEASE ORAL at 09:10

## 2024-07-22 RX ADMIN — HEPARIN SODIUM 5000 UNITS: 5000 INJECTION INTRAVENOUS; SUBCUTANEOUS at 14:32

## 2024-07-22 RX ADMIN — SALINE NASAL SPRAY 1 SPRAY: 1.5 SOLUTION NASAL at 20:53

## 2024-07-22 RX ADMIN — HEPARIN SODIUM 5000 UNITS: 5000 INJECTION INTRAVENOUS; SUBCUTANEOUS at 05:40

## 2024-07-22 RX ADMIN — SODIUM CHLORIDE, PRESERVATIVE FREE 10 ML: 5 INJECTION INTRAVENOUS at 20:55

## 2024-07-22 RX ADMIN — ALLOPURINOL 100 MG: 100 TABLET ORAL at 09:09

## 2024-07-22 RX ADMIN — POTASSIUM CHLORIDE 10 MEQ: 750 CAPSULE, EXTENDED RELEASE ORAL at 20:54

## 2024-07-22 RX ADMIN — Medication 10.5 MG: at 21:51

## 2024-07-22 RX ADMIN — BUSPIRONE HYDROCHLORIDE 10 MG: 10 TABLET ORAL at 20:54

## 2024-07-22 RX ADMIN — ACETAMINOPHEN 650 MG: 325 TABLET ORAL at 01:17

## 2024-07-22 RX ADMIN — MIDODRINE HYDROCHLORIDE 10 MG: 10 TABLET ORAL at 12:05

## 2024-07-22 RX ADMIN — SODIUM BICARBONATE 1300 MG: 650 TABLET ORAL at 09:09

## 2024-07-22 RX ADMIN — INSULIN GLARGINE 72 UNITS: 100 INJECTION, SOLUTION SUBCUTANEOUS at 09:09

## 2024-07-22 RX ADMIN — MIDODRINE HYDROCHLORIDE 10 MG: 10 TABLET ORAL at 10:01

## 2024-07-22 RX ADMIN — ASPIRIN 81 MG CHEWABLE TABLET 81 MG: 81 TABLET CHEWABLE at 09:09

## 2024-07-22 RX ADMIN — INSULIN GLARGINE 42 UNITS: 100 INJECTION, SOLUTION SUBCUTANEOUS at 20:53

## 2024-07-22 RX ADMIN — BUSPIRONE HYDROCHLORIDE 10 MG: 10 TABLET ORAL at 09:09

## 2024-07-22 RX ADMIN — FERROUS SULFATE TAB EC 325 MG (65 MG FE EQUIVALENT) 325 MG: 325 (65 FE) TABLET DELAYED RESPONSE at 09:09

## 2024-07-22 RX ADMIN — SODIUM BICARBONATE 1300 MG: 650 TABLET ORAL at 20:54

## 2024-07-23 PROBLEM — Z99.2 END-STAGE RENAL DISEASE NEEDING DIALYSIS (HCC): Status: ACTIVE | Noted: 2024-07-23

## 2024-07-23 PROBLEM — N18.6 END-STAGE RENAL DISEASE NEEDING DIALYSIS (HCC): Status: ACTIVE | Noted: 2024-07-23

## 2024-07-23 LAB
EKG ATRIAL RATE: 85 BPM
EKG P AXIS: 68 DEGREES
EKG P-R INTERVAL: 186 MS
EKG Q-T INTERVAL: 430 MS
EKG QRS DURATION: 106 MS
EKG QTC CALCULATION (BAZETT): 512 MS
EKG R AXIS: 80 DEGREES
EKG T AXIS: 143 DEGREES
EKG VENTRICULAR RATE: 85 BPM

## 2024-07-23 PROCEDURE — 6370000000 HC RX 637 (ALT 250 FOR IP): Performed by: INTERNAL MEDICINE

## 2024-07-23 PROCEDURE — 6370000000 HC RX 637 (ALT 250 FOR IP)

## 2024-07-23 PROCEDURE — 2700000000 HC OXYGEN THERAPY PER DAY

## 2024-07-23 PROCEDURE — 97530 THERAPEUTIC ACTIVITIES: CPT

## 2024-07-23 PROCEDURE — 82947 ASSAY GLUCOSE BLOOD QUANT: CPT

## 2024-07-23 PROCEDURE — 97116 GAIT TRAINING THERAPY: CPT

## 2024-07-23 PROCEDURE — G0378 HOSPITAL OBSERVATION PER HR: HCPCS

## 2024-07-23 PROCEDURE — 99232 SBSQ HOSP IP/OBS MODERATE 35: CPT | Performed by: NURSE PRACTITIONER

## 2024-07-23 PROCEDURE — 2580000003 HC RX 258: Performed by: NURSE PRACTITIONER

## 2024-07-23 PROCEDURE — 6370000000 HC RX 637 (ALT 250 FOR IP): Performed by: NURSE PRACTITIONER

## 2024-07-23 PROCEDURE — 97110 THERAPEUTIC EXERCISES: CPT

## 2024-07-23 PROCEDURE — 94761 N-INVAS EAR/PLS OXIMETRY MLT: CPT

## 2024-07-23 PROCEDURE — 96372 THER/PROPH/DIAG INJ SC/IM: CPT

## 2024-07-23 PROCEDURE — 6360000002 HC RX W HCPCS: Performed by: NURSE PRACTITIONER

## 2024-07-23 RX ADMIN — ASPIRIN 81 MG CHEWABLE TABLET 81 MG: 81 TABLET CHEWABLE at 08:31

## 2024-07-23 RX ADMIN — Medication 10.5 MG: at 21:26

## 2024-07-23 RX ADMIN — INSULIN LISPRO 20 UNITS: 100 INJECTION, SOLUTION INTRAVENOUS; SUBCUTANEOUS at 12:58

## 2024-07-23 RX ADMIN — ALLOPURINOL 100 MG: 100 TABLET ORAL at 08:32

## 2024-07-23 RX ADMIN — DOCUSATE SODIUM 100 MG: 100 CAPSULE, LIQUID FILLED ORAL at 08:31

## 2024-07-23 RX ADMIN — CARVEDILOL 3.12 MG: 3.12 TABLET, FILM COATED ORAL at 08:31

## 2024-07-23 RX ADMIN — VENLAFAXINE HYDROCHLORIDE 75 MG: 75 CAPSULE, EXTENDED RELEASE ORAL at 08:31

## 2024-07-23 RX ADMIN — POTASSIUM CHLORIDE 10 MEQ: 750 CAPSULE, EXTENDED RELEASE ORAL at 08:32

## 2024-07-23 RX ADMIN — SODIUM CHLORIDE, PRESERVATIVE FREE 10 ML: 5 INJECTION INTRAVENOUS at 08:34

## 2024-07-23 RX ADMIN — HEPARIN SODIUM 5000 UNITS: 5000 INJECTION INTRAVENOUS; SUBCUTANEOUS at 14:51

## 2024-07-23 RX ADMIN — POTASSIUM CHLORIDE 10 MEQ: 750 CAPSULE, EXTENDED RELEASE ORAL at 21:21

## 2024-07-23 RX ADMIN — ACETAMINOPHEN 650 MG: 325 TABLET ORAL at 04:47

## 2024-07-23 RX ADMIN — ATORVASTATIN CALCIUM 80 MG: 80 TABLET, FILM COATED ORAL at 21:20

## 2024-07-23 RX ADMIN — DOCUSATE SODIUM 100 MG: 100 CAPSULE, LIQUID FILLED ORAL at 21:20

## 2024-07-23 RX ADMIN — SODIUM BICARBONATE 1300 MG: 650 TABLET ORAL at 21:20

## 2024-07-23 RX ADMIN — PRASUGREL 10 MG: 10 TABLET, FILM COATED ORAL at 08:32

## 2024-07-23 RX ADMIN — BUMETANIDE 3 MG: 1 TABLET ORAL at 21:21

## 2024-07-23 RX ADMIN — INSULIN LISPRO 4 UNITS: 100 INJECTION, SOLUTION INTRAVENOUS; SUBCUTANEOUS at 08:32

## 2024-07-23 RX ADMIN — BUSPIRONE HYDROCHLORIDE 10 MG: 10 TABLET ORAL at 14:51

## 2024-07-23 RX ADMIN — BUSPIRONE HYDROCHLORIDE 10 MG: 10 TABLET ORAL at 21:21

## 2024-07-23 RX ADMIN — BUSPIRONE HYDROCHLORIDE 10 MG: 10 TABLET ORAL at 08:31

## 2024-07-23 RX ADMIN — INSULIN GLARGINE 72 UNITS: 100 INJECTION, SOLUTION SUBCUTANEOUS at 08:32

## 2024-07-23 RX ADMIN — CARVEDILOL 3.12 MG: 3.12 TABLET, FILM COATED ORAL at 21:20

## 2024-07-23 RX ADMIN — SODIUM BICARBONATE 1300 MG: 650 TABLET ORAL at 08:32

## 2024-07-23 RX ADMIN — SODIUM CHLORIDE, PRESERVATIVE FREE 10 ML: 5 INJECTION INTRAVENOUS at 21:32

## 2024-07-23 RX ADMIN — INSULIN LISPRO 20 UNITS: 100 INJECTION, SOLUTION INTRAVENOUS; SUBCUTANEOUS at 08:40

## 2024-07-23 RX ADMIN — HEPARIN SODIUM 5000 UNITS: 5000 INJECTION INTRAVENOUS; SUBCUTANEOUS at 21:20

## 2024-07-23 RX ADMIN — BUMETANIDE 3 MG: 1 TABLET ORAL at 08:32

## 2024-07-23 RX ADMIN — INSULIN LISPRO 20 UNITS: 100 INJECTION, SOLUTION INTRAVENOUS; SUBCUTANEOUS at 17:08

## 2024-07-23 RX ADMIN — CYCLOBENZAPRINE HYDROCHLORIDE 5 MG: 5 TABLET, FILM COATED ORAL at 08:31

## 2024-07-23 RX ADMIN — FERROUS SULFATE TAB EC 325 MG (65 MG FE EQUIVALENT) 325 MG: 325 (65 FE) TABLET DELAYED RESPONSE at 08:32

## 2024-07-23 NOTE — FLOWSHEET NOTE
07/22/24 2006   Treatment Team Notification   Reason for Communication Evaluate;Review case  (nosebleed)   Name of Team Member Notified Ami Laguerre   Treatment Team Role Advanced Practice Nurse   Method of Communication Secure Message   Response See orders       Informed patient admitted for angina at rest. Patient has a nose bleed. Patient requesting a nasal spray to help stop the nose bleed.     Ami Laguerre ordered \" Sodium chloride (ocean, Baby Ayr) 0.65% nasal spray 1 each nostril every 2 hours prn for congestion.\"

## 2024-07-24 ENCOUNTER — TELEPHONE (OUTPATIENT)
Dept: ORTHOPEDIC SURGERY | Age: 64
End: 2024-07-24

## 2024-07-24 LAB
ANION GAP SERPL CALCULATED.3IONS-SCNC: 14 MMOL/L (ref 9–17)
BASOPHILS # BLD: 0.06 K/UL (ref 0–0.2)
BASOPHILS NFR BLD: 1 %
BUN SERPL-MCNC: 35 MG/DL (ref 8–23)
BUN/CREAT SERPL: 9 (ref 9–20)
CALCIUM SERPL-MCNC: 8.7 MG/DL (ref 8.6–10.4)
CHLORIDE SERPL-SCNC: 98 MMOL/L (ref 98–107)
CO2 SERPL-SCNC: 25 MMOL/L (ref 20–31)
CREAT SERPL-MCNC: 4 MG/DL (ref 0.5–0.9)
EKG ATRIAL RATE: 70 BPM
EKG P AXIS: 62 DEGREES
EKG P-R INTERVAL: 194 MS
EKG Q-T INTERVAL: 424 MS
EKG QRS DURATION: 102 MS
EKG QTC CALCULATION (BAZETT): 457 MS
EKG R AXIS: 75 DEGREES
EKG T AXIS: 173 DEGREES
EKG VENTRICULAR RATE: 70 BPM
EOSINOPHIL # BLD: 0.18 K/UL (ref 0–0.4)
EOSINOPHILS RELATIVE PERCENT: 3 % (ref 1–4)
ERYTHROCYTE [DISTWIDTH] IN BLOOD BY AUTOMATED COUNT: 18.2 % (ref 11.8–14.4)
GFR, ESTIMATED: 12 ML/MIN/1.73M2
GLUCOSE SERPL-MCNC: 113 MG/DL (ref 70–99)
HCT VFR BLD AUTO: 28.1 % (ref 36.3–47.1)
HGB BLD-MCNC: 9.2 G/DL (ref 11.9–15.1)
IMM GRANULOCYTES # BLD AUTO: 0.06 K/UL (ref 0–0.3)
IMM GRANULOCYTES NFR BLD: 1 %
LYMPHOCYTES NFR BLD: 1.34 K/UL (ref 1–4.8)
LYMPHOCYTES RELATIVE PERCENT: 22 % (ref 24–44)
MCH RBC QN AUTO: 38.2 PG (ref 25.2–33.5)
MCHC RBC AUTO-ENTMCNC: 32.7 G/DL (ref 28.4–34.8)
MCV RBC AUTO: 116.6 FL (ref 82.6–102.9)
MONOCYTES NFR BLD: 0.43 K/UL (ref 0.2–0.8)
MONOCYTES NFR BLD: 7 % (ref 1–7)
MORPHOLOGY: ABNORMAL
NEUTROPHILS NFR BLD: 66 % (ref 36–66)
NEUTS SEG NFR BLD: 4.03 K/UL (ref 1.8–7.7)
NRBC BLD-RTO: 0 PER 100 WBC
PHOSPHATE SERPL-MCNC: 5.2 MG/DL (ref 2.6–4.5)
PLATELET # BLD AUTO: 168 K/UL (ref 138–453)
PMV BLD AUTO: 10.7 FL (ref 8.1–13.5)
POTASSIUM SERPL-SCNC: 4.1 MMOL/L (ref 3.7–5.3)
RBC # BLD AUTO: 2.41 M/UL (ref 3.95–5.11)
SODIUM SERPL-SCNC: 137 MMOL/L (ref 135–144)
WBC OTHER # BLD: 6.1 K/UL (ref 3.5–11.3)

## 2024-07-24 PROCEDURE — 99232 SBSQ HOSP IP/OBS MODERATE 35: CPT | Performed by: FAMILY MEDICINE

## 2024-07-24 PROCEDURE — 6370000000 HC RX 637 (ALT 250 FOR IP): Performed by: NURSE PRACTITIONER

## 2024-07-24 PROCEDURE — 94761 N-INVAS EAR/PLS OXIMETRY MLT: CPT

## 2024-07-24 PROCEDURE — 90935 HEMODIALYSIS ONE EVALUATION: CPT

## 2024-07-24 PROCEDURE — G0378 HOSPITAL OBSERVATION PER HR: HCPCS

## 2024-07-24 PROCEDURE — 2580000003 HC RX 258: Performed by: NURSE PRACTITIONER

## 2024-07-24 PROCEDURE — 36415 COLL VENOUS BLD VENIPUNCTURE: CPT

## 2024-07-24 PROCEDURE — 6370000000 HC RX 637 (ALT 250 FOR IP)

## 2024-07-24 PROCEDURE — 6370000000 HC RX 637 (ALT 250 FOR IP): Performed by: INTERNAL MEDICINE

## 2024-07-24 PROCEDURE — 80048 BASIC METABOLIC PNL TOTAL CA: CPT

## 2024-07-24 PROCEDURE — 6360000002 HC RX W HCPCS: Performed by: NURSE PRACTITIONER

## 2024-07-24 PROCEDURE — 85025 COMPLETE CBC W/AUTO DIFF WBC: CPT

## 2024-07-24 PROCEDURE — 2700000000 HC OXYGEN THERAPY PER DAY

## 2024-07-24 PROCEDURE — 96372 THER/PROPH/DIAG INJ SC/IM: CPT

## 2024-07-24 PROCEDURE — 84100 ASSAY OF PHOSPHORUS: CPT

## 2024-07-24 RX ADMIN — SODIUM BICARBONATE 1300 MG: 650 TABLET ORAL at 21:19

## 2024-07-24 RX ADMIN — SODIUM CHLORIDE, PRESERVATIVE FREE 10 ML: 5 INJECTION INTRAVENOUS at 13:00

## 2024-07-24 RX ADMIN — ATORVASTATIN CALCIUM 80 MG: 80 TABLET, FILM COATED ORAL at 21:20

## 2024-07-24 RX ADMIN — Medication 10.5 MG: at 21:31

## 2024-07-24 RX ADMIN — INSULIN GLARGINE 72 UNITS: 100 INJECTION, SOLUTION SUBCUTANEOUS at 09:00

## 2024-07-24 RX ADMIN — INSULIN LISPRO 20 UNITS: 100 INJECTION, SOLUTION INTRAVENOUS; SUBCUTANEOUS at 18:10

## 2024-07-24 RX ADMIN — HEPARIN SODIUM 5000 UNITS: 5000 INJECTION INTRAVENOUS; SUBCUTANEOUS at 14:36

## 2024-07-24 RX ADMIN — PRASUGREL 10 MG: 10 TABLET, FILM COATED ORAL at 14:40

## 2024-07-24 RX ADMIN — DOCUSATE SODIUM 100 MG: 100 CAPSULE, LIQUID FILLED ORAL at 14:38

## 2024-07-24 RX ADMIN — INSULIN GLARGINE 42 UNITS: 100 INJECTION, SOLUTION SUBCUTANEOUS at 21:21

## 2024-07-24 RX ADMIN — ALLOPURINOL 100 MG: 100 TABLET ORAL at 14:39

## 2024-07-24 RX ADMIN — BUMETANIDE 3 MG: 1 TABLET ORAL at 14:37

## 2024-07-24 RX ADMIN — BUMETANIDE 3 MG: 1 TABLET ORAL at 21:19

## 2024-07-24 RX ADMIN — POTASSIUM CHLORIDE 10 MEQ: 750 CAPSULE, EXTENDED RELEASE ORAL at 14:39

## 2024-07-24 RX ADMIN — HEPARIN SODIUM 5000 UNITS: 5000 INJECTION INTRAVENOUS; SUBCUTANEOUS at 21:20

## 2024-07-24 RX ADMIN — POTASSIUM CHLORIDE 10 MEQ: 750 CAPSULE, EXTENDED RELEASE ORAL at 21:19

## 2024-07-24 RX ADMIN — CARVEDILOL 3.12 MG: 3.12 TABLET, FILM COATED ORAL at 14:39

## 2024-07-24 RX ADMIN — BUSPIRONE HYDROCHLORIDE 10 MG: 10 TABLET ORAL at 21:19

## 2024-07-24 RX ADMIN — FERROUS SULFATE TAB EC 325 MG (65 MG FE EQUIVALENT) 325 MG: 325 (65 FE) TABLET DELAYED RESPONSE at 14:38

## 2024-07-24 RX ADMIN — HEPARIN SODIUM 5000 UNITS: 5000 INJECTION INTRAVENOUS; SUBCUTANEOUS at 05:55

## 2024-07-24 RX ADMIN — INSULIN LISPRO 20 UNITS: 100 INJECTION, SOLUTION INTRAVENOUS; SUBCUTANEOUS at 09:00

## 2024-07-24 RX ADMIN — BUSPIRONE HYDROCHLORIDE 10 MG: 10 TABLET ORAL at 14:36

## 2024-07-24 RX ADMIN — SODIUM BICARBONATE 1300 MG: 650 TABLET ORAL at 14:38

## 2024-07-24 RX ADMIN — SODIUM CHLORIDE, PRESERVATIVE FREE 10 ML: 5 INJECTION INTRAVENOUS at 21:20

## 2024-07-24 RX ADMIN — DOCUSATE SODIUM 100 MG: 100 CAPSULE, LIQUID FILLED ORAL at 21:20

## 2024-07-24 RX ADMIN — ASPIRIN 81 MG CHEWABLE TABLET 81 MG: 81 TABLET CHEWABLE at 14:39

## 2024-07-24 ASSESSMENT — ENCOUNTER SYMPTOMS
CHEST TIGHTNESS: 0
BLOOD IN STOOL: 0
DIARRHEA: 0
WHEEZING: 0
SHORTNESS OF BREATH: 0
RHINORRHEA: 0
NAUSEA: 0
VOMITING: 0
ABDOMINAL PAIN: 0
CONSTIPATION: 0
COUGH: 0

## 2024-07-24 NOTE — TELEPHONE ENCOUNTER
spoke with Hailey in regards to seeing Estefany once they are no longer at the hospital. Hailey voiced uderstanding.

## 2024-07-24 NOTE — TELEPHONE ENCOUNTER
Hailey sister, patient is currently in hospital and waiting on insurance to approval for her to release back to rehab.  Would like to know if Taj Avalos can come to hospital to see patient, patient had appointment in office to get her cast off and would like to know if he can come to hospital to remove the cast     Phone [phone]

## 2024-07-25 LAB — GLUCOSE BLD-MCNC: 96 MG/DL (ref 65–105)

## 2024-07-25 PROCEDURE — 6370000000 HC RX 637 (ALT 250 FOR IP): Performed by: NURSE PRACTITIONER

## 2024-07-25 PROCEDURE — 99232 SBSQ HOSP IP/OBS MODERATE 35: CPT | Performed by: FAMILY MEDICINE

## 2024-07-25 PROCEDURE — 6370000000 HC RX 637 (ALT 250 FOR IP)

## 2024-07-25 PROCEDURE — 97116 GAIT TRAINING THERAPY: CPT

## 2024-07-25 PROCEDURE — G0378 HOSPITAL OBSERVATION PER HR: HCPCS

## 2024-07-25 PROCEDURE — 97530 THERAPEUTIC ACTIVITIES: CPT

## 2024-07-25 PROCEDURE — 2700000000 HC OXYGEN THERAPY PER DAY

## 2024-07-25 PROCEDURE — 96372 THER/PROPH/DIAG INJ SC/IM: CPT

## 2024-07-25 PROCEDURE — 6360000002 HC RX W HCPCS: Performed by: NURSE PRACTITIONER

## 2024-07-25 PROCEDURE — 94761 N-INVAS EAR/PLS OXIMETRY MLT: CPT

## 2024-07-25 PROCEDURE — 6370000000 HC RX 637 (ALT 250 FOR IP): Performed by: INTERNAL MEDICINE

## 2024-07-25 PROCEDURE — 2580000003 HC RX 258: Performed by: NURSE PRACTITIONER

## 2024-07-25 PROCEDURE — 97535 SELF CARE MNGMENT TRAINING: CPT

## 2024-07-25 RX ORDER — INSULIN LISPRO 100 [IU]/ML
4 INJECTION, SOLUTION INTRAVENOUS; SUBCUTANEOUS ONCE
Status: COMPLETED | OUTPATIENT
Start: 2024-07-26 | End: 2024-07-25

## 2024-07-25 RX ADMIN — BUMETANIDE 3 MG: 1 TABLET ORAL at 08:36

## 2024-07-25 RX ADMIN — SODIUM CHLORIDE, PRESERVATIVE FREE 10 ML: 5 INJECTION INTRAVENOUS at 21:07

## 2024-07-25 RX ADMIN — PRASUGREL 10 MG: 10 TABLET, FILM COATED ORAL at 08:37

## 2024-07-25 RX ADMIN — ATORVASTATIN CALCIUM 80 MG: 80 TABLET, FILM COATED ORAL at 21:05

## 2024-07-25 RX ADMIN — BUSPIRONE HYDROCHLORIDE 10 MG: 10 TABLET ORAL at 08:37

## 2024-07-25 RX ADMIN — BUMETANIDE 2 MG: 1 TABLET ORAL at 21:06

## 2024-07-25 RX ADMIN — BUSPIRONE HYDROCHLORIDE 10 MG: 10 TABLET ORAL at 21:05

## 2024-07-25 RX ADMIN — SODIUM BICARBONATE 1300 MG: 650 TABLET ORAL at 08:36

## 2024-07-25 RX ADMIN — FERROUS SULFATE TAB EC 325 MG (65 MG FE EQUIVALENT) 325 MG: 325 (65 FE) TABLET DELAYED RESPONSE at 08:38

## 2024-07-25 RX ADMIN — HEPARIN SODIUM 5000 UNITS: 5000 INJECTION INTRAVENOUS; SUBCUTANEOUS at 15:52

## 2024-07-25 RX ADMIN — SODIUM CHLORIDE, PRESERVATIVE FREE 10 ML: 5 INJECTION INTRAVENOUS at 08:38

## 2024-07-25 RX ADMIN — INSULIN LISPRO 20 UNITS: 100 INJECTION, SOLUTION INTRAVENOUS; SUBCUTANEOUS at 08:36

## 2024-07-25 RX ADMIN — HEPARIN SODIUM 5000 UNITS: 5000 INJECTION INTRAVENOUS; SUBCUTANEOUS at 06:00

## 2024-07-25 RX ADMIN — VENLAFAXINE HYDROCHLORIDE 75 MG: 75 CAPSULE, EXTENDED RELEASE ORAL at 08:36

## 2024-07-25 RX ADMIN — INSULIN LISPRO 4 UNITS: 100 INJECTION, SOLUTION INTRAVENOUS; SUBCUTANEOUS at 23:59

## 2024-07-25 RX ADMIN — DOCUSATE SODIUM 100 MG: 100 CAPSULE, LIQUID FILLED ORAL at 08:37

## 2024-07-25 RX ADMIN — BUSPIRONE HYDROCHLORIDE 10 MG: 10 TABLET ORAL at 15:52

## 2024-07-25 RX ADMIN — CARVEDILOL 3.12 MG: 3.12 TABLET, FILM COATED ORAL at 21:05

## 2024-07-25 RX ADMIN — INSULIN LISPRO 20 UNITS: 100 INJECTION, SOLUTION INTRAVENOUS; SUBCUTANEOUS at 12:34

## 2024-07-25 RX ADMIN — ASPIRIN 81 MG CHEWABLE TABLET 81 MG: 81 TABLET CHEWABLE at 08:37

## 2024-07-25 RX ADMIN — INSULIN GLARGINE 72 UNITS: 100 INJECTION, SOLUTION SUBCUTANEOUS at 08:49

## 2024-07-25 RX ADMIN — Medication 10.5 MG: at 21:14

## 2024-07-25 RX ADMIN — POTASSIUM CHLORIDE 10 MEQ: 750 CAPSULE, EXTENDED RELEASE ORAL at 08:37

## 2024-07-25 RX ADMIN — HEPARIN SODIUM 5000 UNITS: 5000 INJECTION INTRAVENOUS; SUBCUTANEOUS at 21:07

## 2024-07-25 RX ADMIN — POTASSIUM CHLORIDE 10 MEQ: 750 CAPSULE, EXTENDED RELEASE ORAL at 21:05

## 2024-07-25 RX ADMIN — INSULIN LISPRO 20 UNITS: 100 INJECTION, SOLUTION INTRAVENOUS; SUBCUTANEOUS at 17:31

## 2024-07-25 RX ADMIN — ALLOPURINOL 100 MG: 100 TABLET ORAL at 08:37

## 2024-07-25 RX ADMIN — SODIUM BICARBONATE 1300 MG: 650 TABLET ORAL at 21:05

## 2024-07-25 ASSESSMENT — ENCOUNTER SYMPTOMS
BLOOD IN STOOL: 0
VOMITING: 0
DIARRHEA: 0
COUGH: 0
ABDOMINAL PAIN: 0
CHEST TIGHTNESS: 0
CONSTIPATION: 0
SHORTNESS OF BREATH: 0
NAUSEA: 0
RHINORRHEA: 0
WHEEZING: 0

## 2024-07-26 VITALS
HEIGHT: 65 IN | HEART RATE: 74 BPM | BODY MASS INDEX: 41.69 KG/M2 | OXYGEN SATURATION: 95 % | DIASTOLIC BLOOD PRESSURE: 58 MMHG | WEIGHT: 250.22 LBS | TEMPERATURE: 97.5 F | SYSTOLIC BLOOD PRESSURE: 108 MMHG | RESPIRATION RATE: 12 BRPM

## 2024-07-26 PROCEDURE — G0378 HOSPITAL OBSERVATION PER HR: HCPCS

## 2024-07-26 PROCEDURE — 6370000000 HC RX 637 (ALT 250 FOR IP): Performed by: INTERNAL MEDICINE

## 2024-07-26 PROCEDURE — 6370000000 HC RX 637 (ALT 250 FOR IP)

## 2024-07-26 PROCEDURE — 2580000003 HC RX 258: Performed by: NURSE PRACTITIONER

## 2024-07-26 PROCEDURE — 96372 THER/PROPH/DIAG INJ SC/IM: CPT

## 2024-07-26 PROCEDURE — 99232 SBSQ HOSP IP/OBS MODERATE 35: CPT | Performed by: FAMILY MEDICINE

## 2024-07-26 PROCEDURE — 6360000002 HC RX W HCPCS: Performed by: NURSE PRACTITIONER

## 2024-07-26 PROCEDURE — 90935 HEMODIALYSIS ONE EVALUATION: CPT

## 2024-07-26 PROCEDURE — 36415 COLL VENOUS BLD VENIPUNCTURE: CPT

## 2024-07-26 PROCEDURE — 94761 N-INVAS EAR/PLS OXIMETRY MLT: CPT

## 2024-07-26 PROCEDURE — 2700000000 HC OXYGEN THERAPY PER DAY

## 2024-07-26 RX ORDER — BUMETANIDE 1 MG/1
3 TABLET ORAL
Status: DISCONTINUED | OUTPATIENT
Start: 2024-07-27 | End: 2024-07-26 | Stop reason: HOSPADM

## 2024-07-26 RX ORDER — MIDODRINE HYDROCHLORIDE 10 MG/1
10 TABLET ORAL
Status: COMPLETED | OUTPATIENT
Start: 2024-07-26 | End: 2024-07-26

## 2024-07-26 RX ORDER — BUMETANIDE 1 MG/1
3 TABLET ORAL
Qty: 30 TABLET | Refills: 3 | DISCHARGE
Start: 2024-07-27

## 2024-07-26 RX ADMIN — MIDODRINE HYDROCHLORIDE 10 MG: 10 TABLET ORAL at 10:21

## 2024-07-26 RX ADMIN — HEPARIN SODIUM 5000 UNITS: 5000 INJECTION INTRAVENOUS; SUBCUTANEOUS at 05:43

## 2024-07-26 RX ADMIN — SODIUM BICARBONATE 1300 MG: 650 TABLET ORAL at 09:05

## 2024-07-26 RX ADMIN — INSULIN LISPRO 2 UNITS: 100 INJECTION, SOLUTION INTRAVENOUS; SUBCUTANEOUS at 12:19

## 2024-07-26 RX ADMIN — ALLOPURINOL 100 MG: 100 TABLET ORAL at 09:06

## 2024-07-26 RX ADMIN — DOCUSATE SODIUM 100 MG: 100 CAPSULE, LIQUID FILLED ORAL at 09:06

## 2024-07-26 RX ADMIN — ASPIRIN 81 MG CHEWABLE TABLET 81 MG: 81 TABLET CHEWABLE at 09:05

## 2024-07-26 RX ADMIN — BUSPIRONE HYDROCHLORIDE 10 MG: 10 TABLET ORAL at 09:06

## 2024-07-26 RX ADMIN — FERROUS SULFATE TAB EC 325 MG (65 MG FE EQUIVALENT) 325 MG: 325 (65 FE) TABLET DELAYED RESPONSE at 09:06

## 2024-07-26 RX ADMIN — MIDODRINE HYDROCHLORIDE 10 MG: 10 TABLET ORAL at 12:21

## 2024-07-26 RX ADMIN — BUSPIRONE HYDROCHLORIDE 10 MG: 10 TABLET ORAL at 14:37

## 2024-07-26 RX ADMIN — HEPARIN SODIUM 5000 UNITS: 5000 INJECTION INTRAVENOUS; SUBCUTANEOUS at 14:37

## 2024-07-26 RX ADMIN — INSULIN LISPRO 20 UNITS: 100 INJECTION, SOLUTION INTRAVENOUS; SUBCUTANEOUS at 09:07

## 2024-07-26 RX ADMIN — VENLAFAXINE HYDROCHLORIDE 75 MG: 75 CAPSULE, EXTENDED RELEASE ORAL at 09:06

## 2024-07-26 RX ADMIN — PRASUGREL 10 MG: 10 TABLET, FILM COATED ORAL at 09:05

## 2024-07-26 RX ADMIN — INSULIN GLARGINE 72 UNITS: 100 INJECTION, SOLUTION SUBCUTANEOUS at 09:04

## 2024-07-26 RX ADMIN — SODIUM CHLORIDE, PRESERVATIVE FREE 10 ML: 5 INJECTION INTRAVENOUS at 09:18

## 2024-07-26 RX ADMIN — POTASSIUM CHLORIDE 10 MEQ: 750 CAPSULE, EXTENDED RELEASE ORAL at 09:06

## 2024-07-26 RX ADMIN — INSULIN LISPRO 20 UNITS: 100 INJECTION, SOLUTION INTRAVENOUS; SUBCUTANEOUS at 12:20

## 2024-07-26 RX ADMIN — INSULIN LISPRO 4 UNITS: 100 INJECTION, SOLUTION INTRAVENOUS; SUBCUTANEOUS at 09:03

## 2024-07-26 RX ADMIN — INSULIN LISPRO 20 UNITS: 100 INJECTION, SOLUTION INTRAVENOUS; SUBCUTANEOUS at 17:06

## 2024-07-26 RX ADMIN — INSULIN LISPRO 4 UNITS: 100 INJECTION, SOLUTION INTRAVENOUS; SUBCUTANEOUS at 17:05

## 2024-07-26 ASSESSMENT — ENCOUNTER SYMPTOMS
CONSTIPATION: 0
CHEST TIGHTNESS: 0
RHINORRHEA: 0
VOMITING: 0
WHEEZING: 0
NAUSEA: 0
COUGH: 0
ABDOMINAL PAIN: 0
SHORTNESS OF BREATH: 0
BLOOD IN STOOL: 0
DIARRHEA: 0

## 2024-07-26 NOTE — CARE COORDINATION
DC Planning    Spoke with LSW precert still pending for [place] [facility] [employer].   
Social  Work-COntacted Reena of [facility]. They have not received auth. Deja  
Social Work- Left message for Reena of [facility]. Deja  
Social Work-Checked with Reena Lewis. No precert. Deja  
Social Work-[place] [facility] has not received auth. Deja  
Social Work-Left message for Reena of [facility]. Deja  
Social Work-Reena Lewis has not received auth. Deja  
Social Work-Spoke with Reena fernandez [facility]. They have not received auth.Deja  
Social Work-Spoke with Reena. They confirmed that patient will need a new auth. Deja  
Social work: Patient to dc to [facility] via [employer] at 5:30PM.  # for RN report: [phone]. Completed ANTHONY faxed to [phone].  Informed RN, pt, and facility of dc time, agreeable to plan.    
Social work: Spoke to Katelynn/Sue , who stated precert is still pending.   Call to [facility] to see if they have any updates on precert, vm left.  
Social work: spoke to mine fernandez [place] they state pt needs new precert to return. Faxed updates today to help with precert in the morning. Will need poli and Rx at discharge. Once precert is obtained. Estefany anderson  
Social work; faxed referral for return to [facility]. [place] did confer with family and that is the plan at discharge. Pt goes to [facility]. Estefany anderson  
discharge plan with any other family members/significant others, and if so, who? Yes (family)  Plans to Return to Present Housing: Yes  Other Identified Issues/Barriers to RETURNING to current housing: medical condition  Potential Assistance needed at discharge: N/A            Potential DME:    Patient expects to discharge to: Skilled nursing facility  Plan for transportation at discharge: Self    Financial    Payor: [employer] / Plan: [employer] / Product Type: *No Product type* /     Does insurance require precert for SNF: Yes    Potential assistance Purchasing Medications: No  Meds-to-Beds request:        [facility] [address] [facility] [address] [facility] [phone] - F [phone]  [address] [facility] [address]  Phone: [phone] Fax: [phone]    [facility] [address] [phone] - F [phone]  [address] [facility] [address]  Phone: [phone] Fax: [phone]      Notes:    Factors facilitating achievement of predicted outcomes: Cooperative and Pleasant    Barriers to discharge: Pain, Limited family support, Decreased endurance, and Medical complications    Additional Case Management Notes: ESRD. HD pt. At [facility] ([facility] From [facility]ena [facility]. Plans to return there. SW to make referral. Has najma, cane, w/c and sc. Cardiology and nephrology consulted. PT/OT to eval.     The Plan for Transition of Care is related to the following treatment goals of ESRD (end stage renal disease) (HCC) [N18.6]    IF APPLICABLE: The Patient and/or patient representative Estefany and her family were provided with a choice of provider and agrees with the discharge plan. Freedom of choice list with basic dialogue that supports the patient's individualized plan of care/goals and shares the quality data associated with the providers was provided to: Patient   Patient Representative Name:       The Patient and/or Patient Representative

## 2024-07-26 NOTE — PROGRESS NOTES
Allie Beatty       2024  Dialysis     Progress Notes     Signed     Date of Service: 2024     Signed         HEMODIALYSIS PRE-TREATMENT NOTE     Patient Identifiers prior to treatment: Name, , MRN     Isolation Required: yes                      Isolation Type: contact        (please document if patient is being managed as a PUI/COVID-19 patient)        Hepatitis status:        Date Drawn    Result      Hepatitis B Surface Antigen           negative      Hepatitis B Surface Antibody       3/18/2024    Pos > 1000      Hepatitis B Core Antibody       2024    negative            How was Hepatitis Status verified: results in EPIC     Was a copy of the labs you documented provided to facility for the patient's chart: yes     Hemodialysis orders verified: yes     Access Within normal limits ( I.e. s/s of infection,...): yes      Pre-Assessment completed: yes     Pre-dialysis report received from: Reji                      Time:945                 
   Jamarcus Hackett, RN  Registered Nurse  Dialysis     Progress Notes     Addendum   948          HEMODIALYSIS PRE-TREATMENT NOTE     Patient Identifiers prior to treatment: Name, , Wrist Band     Isolation Required: yes                      Isolation Type: contact        (please document if patient is being managed as a PUI/COVID-19 patient)        Hepatitis status:        Date Drawn    Result      Hepatitis B Surface Antigen           negative      Hepatitis B Surface Antibody       3/18/2024    Pos > 1000      Hepatitis B Core Antibody       2024    negative            How was Hepatitis Status verified: results in EPIC     Was a copy of the labs you documented provided to facility for the patient's chart: yes     Hemodialysis orders verified: yes     Access Within normal limits ( I.e. s/s of infection,...): yes      Pre-Assessment completed: yes     Pre-dialysis report received from: Stephenie Hernandez RN                      Time: 930           Revision History       
  HEMODIALYSIS PRE-TREATMENT NOTE    Patient Identifiers prior to treatment: Name, , Wrist Band    Isolation Required: yes                      Isolation Type: contact       (please document if patient is being managed as a PUI/COVID-19 patient)      Hepatitis status:      Date Drawn   Result     Hepatitis B Surface Antigen        negative     Hepatitis B Surface Antibody     3/18/2024   Pos > 1000     Hepatitis B Core Antibody     2024   negative         How was Hepatitis Status verified: results in EPIC     Was a copy of the labs you documented provided to facility for the patient's chart: yes    Hemodialysis orders verified: yes    Access Within normal limits ( I.e. s/s of infection,...): yes     Pre-Assessment completed: yes    Pre-dialysis report received from: Stephenie Hernandez RN                      Time: 930     
  Physician Progress Note      PATIENT:               SANDRA LAUREANO  [facility] #:                  [id number]  :                       1960  ADMIT DATE:       2024 6:28 PM  DISCH DATE:  RESPONDING  PROVIDER #:        Madhu Vázquez DO          QUERY TEXT:    Patient admitted with chest pain.  Patient noted to have Angina at rest and   NSTEMI documented. If possible, please document in progress notes and   discharge summary if you are evaluating and/or treating any of the following:    The medical record reflects the following:  Risk Factors: Hx MI, ESRD, CAD, CHF, Coronary atherosclerosis of artery bypass   graft  Clinical Indicators: EKGs, negative acute ischemic event. Elevated troponins,   chest pain at rest. Cardio note states \"Type 2 MI due to ESRD and other   medical issues- no chest pain.\"  Nephro consult states \"Ultimately she was   transferred from dialysis to the emergency room where she was found to have   elevated troponins, elevation is chronic and below her baseline.\"  Treatment: labs, imaging, cardio and nephro consults, telemetry.    Thank you!  Sanam Das RN, CRCR  Clinical   Options provided:  -- Chest pain due to CAD with unstable angina  -- Chest pain due to NSTEMI  -- Chest pain due to please specify, Please document evidence  -- Other - I will add my own diagnosis  -- Disagree - Not applicable / Not valid  -- Disagree - Clinically unable to determine / Unknown  -- Refer to Clinical Documentation Reviewer    PROVIDER RESPONSE TEXT:    Angina at rest    Query created by: Giovanna Das on 2024 4:15 PM      Electronically signed by:  Madhu Vázquez DO 2024 4:23 PM          
  Physician Progress Note      PATIENT:               SANDRA LAUREANO  [facility] #:                  [id number]  :                       1960  ADMIT DATE:       2024 6:28 PM  DISCH DATE:  RESPONDING  PROVIDER #:        Madhu Vázquez DO          QUERY TEXT:    Pt admitted with chest pain and shortness of breath.  H&P states \"Patient   normally does not require supplemental oxygen and is 88% room air.\" If   possible, please document in the progress notes and discharge summary if you   are evaluating and/or treating any of the following:    The medical record reflects the following:  Risk Factors: Chest pain and shortness of breath, CAD, ESRD on dialysis  Clinical Indicators: HR 70's-80's, RR 12-28,  Non productive cough,   excessively tired. 88% on room air, continues on 1-3L/O2 throughout stay, SpO2   ranges 88%-96  Treatment: O2, labs, imaging, telemetry, Bumex    Thank you!  Sanam Das RN, CRCR  Clinical   Options provided:  -- Acute respiratory failure with hypoxia  -- Chronic respiratory failure with hypoxia  -- Acute on chronic respiratory failure with hypoxia  -- Other - I will add my own diagnosis  -- Disagree - Not applicable / Not valid  -- Disagree - Clinically unable to determine / Unknown  -- Refer to Clinical Documentation Reviewer    PROVIDER RESPONSE TEXT:    This patient is in acute respiratory failure with hypoxia.    Query created by: Giovanna Das on 2024 4:30 PM      Electronically signed by:  Madhu Vázquez DO 2024 4:33 PM          
Discharge Note:      All discharge instructions given at this time as well as all patient belongings returned to patient. Pt denies any further questions regarding discharge at this time. Pt given discharge packet with unit letter, discharge instructions/restrictions and medication handouts regarding all discharge medications and side effects. Pt denies any further issues at this time.    Pt wheeled out to front discharge doors withy [employer] transport at this time.     Pt left premises without any issues in [employer] van at this time. Pt leaving to [facility]    
End Of Shift Note  [facility]    Summary of shift: Patient arrived shortly before shift change, HD to bedside, 2 L off. No c/o of chest pain since HD, SOB improving. Soft pressures throughout the rest of the night, asymptomatic.     Vitals:    Vitals:    07/20/24 0057 07/20/24 0130 07/20/24 0227 07/20/24 0400   BP: (!) 80/45   (!) 104/42   Pulse: 81  81 81   Resp: 23  18 16   Temp:  97.7 °F (36.5 °C)  97.3 °F (36.3 °C)   TempSrc:    Temporal   SpO2: 96%  96% 97%   Weight: 116.4 kg (256 lb 11.2 oz)   114.5 kg (252 lb 8 oz)        I&O:   Intake/Output Summary (Last 24 hours) at 7/20/2024 0534  Last data filed at 7/20/2024 0057  Gross per 24 hour   Intake 790 ml   Output 2500 ml   Net -1710 ml       Resp Status: 3 L NC    Ventilator Settings:     / / /     Critical Care IV infusions:   nitroGLYCERIN      sodium chloride      dextrose          LDA:   Peripheral IV 07/19/24 Right;Upper Arm (Active)   Number of days: 0       External Urinary Catheter (Active)   Number of days: 0       Hemodialysis Fistula/Graft Left Forearm (Active)   Number of days:        Hemodialysis Fistula/Graft Left Arm (Active)   Number of days: 774       Hemodialysis Fistula/Graft Arteriovenous fistula Left Forearm (Active)   Number of days:        Wound 06/09/24 Buttocks Left;Right (Active)   Number of days: 40       Wound 07/07/24 Radial Right (Active)   Number of days: 12          
End Of Shift Note  [facility]  Summary of shift: Episode of chest pressure with sharp midsternal pain with PT this AM. EKG unremarkable. Period of symptomatic hypotension concurrent with chest pressure. Unable to tolerate Tberg; one time dose midodrine administered and was effective. Symptoms resolved, no issues for the duration of shift. Able to tolerate activity later in the day, up to chair late afternoon. BG levels remain quite elevated - Home regimen of Lantus, humalog, and sliding scale ordered, with some improvement noted by end of shift. Compliant with fluid restriction.  Will need precert started in order to return to [facility].     Vitals:    Vitals:    07/21/24 1200 07/21/24 1600 07/21/24 1615 07/21/24 1730   BP: (!) 100/56  135/76    Pulse: 74  79    Resp: 21 22    Temp: 97.3 °F (36.3 °C)  97.7 °F (36.5 °C)    TempSrc: Temporal  Temporal    SpO2: 94% 96% 92% 98%   Weight:       Height:            I&O:   Intake/Output Summary (Last 24 hours) at 7/21/2024 1847  Last data filed at 7/21/2024 1700  Gross per 24 hour   Intake 1815 ml   Output 250 ml   Net 1565 ml       Resp Status: Still requiring supplemental O2, down to 1L at shift change. Periodic dry, nonproductive cough and fine bilat basilar crackles noted. Supplied IS, patient agreeable to use and shows good quality efforts    Ventilator Settings:     / / /     Critical Care IV infusions:   nitroGLYCERIN      sodium chloride      dextrose          LDA:   Peripheral IV 07/19/24 Right;Upper Arm (Active)   Number of days: 2       External Urinary Catheter (Active)   Number of days: 2       Hemodialysis Fistula/Graft Left Forearm (Active)   Number of days:        Hemodialysis Fistula/Graft Left Arm (Active)   Number of days: 775       Hemodialysis Fistula/Graft Arteriovenous fistula Left Forearm (Active)   Number of days:        Wound 07/07/24 Radial Right (Active)   Number of days: 14          
End Of Shift Note  [facility]  Summary of shift: Patient had a very uneventful shift. Plan today for dialysis treatment. Waiting for precert for [facility] for discharge.    Vitals:    Vitals:    07/21/24 2000 07/22/24 0000 07/22/24 0400 07/22/24 0419   BP: (!) 135/53 (!) 125/48 (!) 126/48    Pulse: 76 73 71 73   Resp: 24 18 14 15   Temp: 97.2 °F (36.2 °C) 97.9 °F (36.6 °C) 97 °F (36.1 °C)    TempSrc: Temporal Temporal Temporal    SpO2: 95% 94% 98% 98%   Weight:   115.9 kg (255 lb 8 oz)    Height:            I&O:   Intake/Output Summary (Last 24 hours) at 7/22/2024 0729  Last data filed at 7/22/2024 0500  Gross per 24 hour   Intake 605 ml   Output 300 ml   Net 305 ml       Resp Status: 1L NC. Fine crackles noted to bases    Ventilator Settings:     / / /     Critical Care IV infusions:   nitroGLYCERIN      sodium chloride      dextrose          LDA:   Peripheral IV 07/19/24 Right;Upper Arm (Active)   Number of days: 2       External Urinary Catheter (Active)   Number of days: 2       Hemodialysis Fistula/Graft Left Forearm (Active)   Number of days:        Hemodialysis Fistula/Graft Left Arm (Active)   Number of days: 776       Hemodialysis Fistula/Graft Arteriovenous fistula Left Forearm (Active)   Number of days:        Wound 07/07/24 Radial Right (Active)   Number of days: 14         
End Of Shift Note  [facility]  Summary of shift: Patient had uneventful night.  BP continuing to be soft, but stable.  Patient asymptomatic.  Awaiting pre-cert to [facility] for discharge.    Vitals:    Vitals:    07/25/24 0200 07/25/24 0400 07/25/24 0500 07/25/24 0600   BP: (!) 106/41 (!) 81/53  95/65   Pulse: 74 71 76 77   Resp: 15 18 13 16   Temp:  97.5 °F (36.4 °C)     TempSrc:  Temporal     SpO2:  99%     Weight:  113.4 kg (250 lb)     Height:            I&O:   Intake/Output Summary (Last 24 hours) at 7/25/2024 0611  Last data filed at 7/24/2024 1846  Gross per 24 hour   Intake 300 ml   Output 2700 ml   Net -2400 ml       Resp Status: 0.5L nasal canula    Ventilator Settings:     / / /     Critical Care IV infusions:   nitroGLYCERIN      sodium chloride      dextrose          LDA:   Peripheral IV 07/19/24 Right;Upper Arm (Active)   Number of days: 5       External Urinary Catheter (Active)   Number of days: 5       Hemodialysis Fistula/Graft Left Arm (Active)   Number of days: 779       Wound 07/07/24 Radial Right (Active)   Number of days: 17          
End Of Shift Note  [facility]  Summary of shift: Patient had uneventful night. Patient was up to chair until bedtime. BP continuing to be soft, but stable. Patient asymptomatic. Due for dialysis today. Still awaiting pre-cert to [facility] for discharge.     Vitals:    Vitals:    07/25/24 2200 07/26/24 0000 07/26/24 0200 07/26/24 0400   BP: 122/78 93/61 (!) 87/45 (!) 95/54   Pulse: 79 72 72 77   Resp: 21 16 14 12   Temp:  97.9 °F (36.6 °C)     TempSrc:  Temporal     SpO2:  97%     Weight:       Height:            I&O:   Intake/Output Summary (Last 24 hours) at 7/26/2024 0549  Last data filed at 7/25/2024 2216  Gross per 24 hour   Intake --   Output 400 ml   Net -400 ml       Resp Status: 1L nasal canula    Ventilator Settings:     / / /     Critical Care IV infusions:   nitroGLYCERIN      sodium chloride      dextrose          LDA:   Peripheral IV 07/19/24 Right;Upper Arm (Active)   Number of days: 6       External Urinary Catheter (Active)   Number of days: 6       Hemodialysis Fistula/Graft Left Arm (Active)   Number of days: 780       Wound 07/07/24 Radial Right (Active)   Number of days: 18          
End Of Shift Note  [facility]  Summary of shift: Patient seen by cardiology and nephrology - cleared by both services.   PATIENT IS DISCHARGED.  Hemodynamically stable all all shift, though BG remained in 300s. Noted not to have home Lantus doses ordered and patient is currently on low dose coverage. MD updated.   When patient was updated on holding status of discharge/transportation, she offered to call facility- states she will need prior authorization in order to return.     Vitals:    Vitals:    07/20/24 0900 07/20/24 1007 07/20/24 1200 07/20/24 1600   BP: (!) 123/56 (!) 109/35 94/76 (!) 109/34   Pulse: 88 84 81 71   Resp: 12  14 14   Temp:   97.3 °F (36.3 °C) 96.9 °F (36.1 °C)   TempSrc:   Temporal Temporal   SpO2:   98% 98%   Weight:            I&O:   Intake/Output Summary (Last 24 hours) at 7/20/2024 1855  Last data filed at 7/20/2024 1852  Gross per 24 hour   Intake 1040 ml   Output 2720 ml   Net -1680 ml       Resp Status: 2L NC    Ventilator Settings:     / / /     Critical Care IV infusions:   nitroGLYCERIN      sodium chloride      dextrose          LDA:   Peripheral IV 07/19/24 Right;Upper Arm (Active)   Number of days: 1       External Urinary Catheter (Active)   Number of days: 1       Hemodialysis Fistula/Graft Left Forearm (Active)   Number of days:        Hemodialysis Fistula/Graft Left Arm (Active)   Number of days: 774       Hemodialysis Fistula/Graft Arteriovenous fistula Left Forearm (Active)   Number of days:        Wound 07/07/24 Radial Right (Active)   Number of days: 13          
End Of Shift Note  [facility]  Summary of shift: Pt had an uneventful shift, pt rested well. Pt BP soft. Waiting on precert to [facility].     Vitals:    Vitals:    07/23/24 2120 07/24/24 0000 07/24/24 0343 07/24/24 0353   BP: (!) 136/51 (!) 105/39  (!) 111/47   Pulse: 66 69 71 71   Resp:  13 23 15   Temp:  97.5 °F (36.4 °C)  97.3 °F (36.3 °C)   TempSrc:  Temporal  Temporal   SpO2:  100% 92% 98%   Weight:       Height:            I&O: No intake or output data in the 24 hours ending 07/24/24 0607    Resp Status: 0.5 NC    Ventilator Settings:     / / /     Critical Care IV infusions:   nitroGLYCERIN      sodium chloride      dextrose          LDA:   Peripheral IV 07/19/24 Right;Upper Arm (Active)   Number of days: 4       External Urinary Catheter (Active)   Number of days: 4       Hemodialysis Fistula/Graft Left Arm (Active)   Number of days: 778       Wound 07/07/24 Radial Right (Active)   Number of days: 16         
HD complete, Jamarcus, RN reported 2 L off. Patient resting in bed comfortably with no c/o of chest pain at ths time, VSS.  
HEMODIALYSIS POST TREATMENT NOTE     Treatment time ordered: 180 mins  Actual treatment time: 180 mins      UltraFiltration Goal: 2000 ml  UltraFiltration Removed: 2000 ml       Pre Treatment weight: 117.3 kgs  Post Treatment weight: 116.4 kgs  Estimated Dry Weight: 113 kgs     Access used:     Central Venous Catheter:           Tunneled or Non-tunneled: na           Site: na           Access Flow: na       Internal Access:       AV Fistula or AV Graft: AVF          Site: ROBIN        Access Flow: 350        Sign and symptoms of infection: na       If YES: na     Medications or blood products given: Midodrine 10 mg at start of dialysis, albumin ordered but not given SBP was over 90.      Chronic outpatient schedule: [facility]     Chronic outpatient unit: [facility]     Summary of response to treatment: Pt tx d/c , all blood returned dialyzer streaked, both needles pulled, held 5 minutes each, w/o issue, 2000 removed from pt today.      Explain if orders NOT met, was physician notified:trinh RODRIGUEZ flowsheet faxed to patient unit/ placed in patient chart: Yes     Post assessment completed: Yes     Report given to: Stephenie Hernandez RN          * Intra-treatment documented Safety Checks include the followin) Access and face visible at all times.     2) All connections and blood lines are secure with no kinks.     3) NVL alarm engaged.     4) Hemosafe device applied (if applicable).     5) No collapse of Arterial or Venous blood chambers.     6) All blood lines / pump segments in the air detectors.   
HEMODIALYSIS POST TREATMENT NOTE     Treatment time ordered: 210    Actual treatment time: 210     UltraFiltration Goal: 2300  UltraFiltration Removed: 2300        Pre Treatment weight: 115.8kg     From 7/22...   Not able to obtain weight as bed was zeroed while patient in bed.      Post Treatment weight: 113.5kg   From 7/22...   Not able to obtain weight as bed was zeroed while patient in bed.    Estimated Dry Weight: 113.5        Internal Access:       AV Fistula or AV Graft: AVF          Site: ROBIN        Access Flow: 400        Sign and symptoms of infection: na       If YES: na     Medications or blood products given: see MAR     Chronic outpatient schedule: [facility]     Chronic outpatient unit: [facility]     Summary of response to treatment: Pt tx d/c all blood returned, Dialyzer streaked, 2300 removed, needles pulled and both sites held 5 min each     Explain if orders NOT met, was physician notified:trinh RODRIGUEZ flowsheet faxed to patient unit/ placed in patient chart: yes     Post assessment completed: yes     Report given to: Edwin Nava RN  
HEMODIALYSIS POST TREATMENT NOTE    Treatment time ordered: 210    Actual treatment time: 210    UltraFiltration Goal: 2300  UltraFiltration Removed: 2300      Pre Treatment weight: 115.8  Post Treatment weight: 113.5  Estimated Dry Weight: 113.5    Access used:     Central Venous Catheter:          Tunneled or Non-tunneled: na           Site: na          Access Flow: na      Internal Access:       AV Fistula or AV Graft: AVF         Site: ROBIN       Access Flow: 400       Sign and symptoms of infection: na       If YES: na    Medications or blood products given: see MAR    Chronic outpatient schedule: [facility]    Chronic outpatient unit: [facility]    Summary of response to treatment: Pt tx d/c all blood returned, Dialyzer streaked, 2300 removed, needles pulled and both sites held 5 min each    Explain if orders NOT met, was physician notified:trinh RODRIGUEZ flowsheet faxed to patient unit/ placed in patient chart: yes    Post assessment completed: yes    Report given to: Rsoie Aguila Rn      * Intra-treatment documented Safety Checks include the followin) Access and face visible at all times.     2) All connections and blood lines are secure with no kinks.     3) NVL alarm engaged.     4) Hemosafe device applied (if applicable).     5) No collapse of Arterial or Venous blood chambers.     6) All blood lines / pump segments in the air detectors.   
HEMODIALYSIS POST TREATMENT NOTE    Treatment time ordered: 210    Actual treatment time: 210    UltraFiltration Goal: 2400  UltraFiltration Removed: 2400      Pre Treatment weight: 115.6  Post Treatment weight: 113.5  Estimated Dry Weight: 113.5    Access used:     Central Venous Catheter:          Tunneled or Non-tunneled: na           Site: na          Access Flow: na      Internal Access:       AV Fistula or AV Graft: AVF         Site: ROBIN       Access Flow: 400       Sign and symptoms of infection: na       If YES: na    Medications or blood products given: see MAR    Chronic outpatient schedule: [facility]    Chronic outpatient unit: [facility]    Summary of response to treatment: Pt tx d/c all blood returned, dialyzer streaked, 2100 removed, and pt reached DW. Both needles pulled, and sites held 5 minutes each. Pt stable, feels good.     Explain if orders NOT met, was physician notified:trinh RODRIGUEZ flowsheet faxed to patient unit/ placed in patient chart: yes    Post assessment completed: yes    Report given to: Reji Deutsch Rn      * Intra-treatment documented Safety Checks include the followin) Access and face visible at all times.     2) All connections and blood lines are secure with no kinks.     3) NVL alarm engaged.     4) Hemosafe device applied (if applicable).     5) No collapse of Arterial or Venous blood chambers.     6) All blood lines / pump segments in the air detectors.   
HEMODIALYSIS PRE-TREATMENT NOTE     Patient Identifiers prior to treatment: Name, , Wrist Band     Isolation Required: yes                      Isolation Type: contact        (please document if patient is being managed as a PUI/COVID-19 patient)        Hepatitis status:        Date Drawn    Result      Hepatitis B Surface Antigen           negative      Hepatitis B Surface Antibody       3/18/2024    Pos > 1000      Hepatitis B Core Antibody       2024    negative            How was Hepatitis Status verified: results in EPIC     Was a copy of the labs you documented provided to facility for the patient's chart: yes     Hemodialysis orders verified: yes     Access Within normal limits ( I.e. s/s of infection,...): yes      Pre-Assessment completed: yes     Pre-dialysis report received from: Edwin Hoskins RN                      Time: 930  
Nephrology ESRD Note     History of Present Illness:              This is a 64 y.o. female with end stage renal disease on hemodialysis Epnvtr-Idzkmrpny-Gbryce at [facility] dialysis under Dr. Jurado who presented with chest pain.  Patient was brought into the hospital after suffering chest pressure at [facility] that she says lasted about 1 hour.  She did have a recent cardiac catheterization and stents placed in February 2024.  She has a history of myocardial infarction and CABG as well.  She has type 2 diabetes mellitus, hypertension and history of CHF with preserved LVEF.  She does take on Bumex in addition to her dialysis care.  She is currently on contact isolation.  Patient does have chronically low blood pressures.  The patient awake alert mentating appropriately.     She tolerated dialysis on 7/19/2024 quite well.  She does not have any chest pain currently.  She did not have any chest pain with dialysis.  No significant swelling of the extremities.  No issues with AV fistula function left UE.  She does not have any chest pain shortness of breath or nausea or vomiting this morning.  There is consideration for discharge early this coming week.  Troponin was 203 initially and then went down to 186 and then back up to 212 today.  proBNP 4600.  I do not see a note from cardiology.     Labs today showed sodium 133 with potassium 4.4 chloride 96 and CO2 24 with glucose 252 calcium 9. White blood cells are 5.9 with hemoglobin 9.1 and platelet count of 155.  She does have macrocytosis.     Past medical history and past surgical history reviewed below.     She has been living at [facility] after discharge following a fall with fracture of right hand/wrist.  She is in a cast.     She is a never smoker.  She does not use alcohol or illicit drugs.      Medications are reviewed.  Allergies are numerous and are reviewed.    Past Medical History:        Diagnosis Date    Arthritis     Backache, 
Nephrology Progress Note        Subjective: Patient is seen and examined on hemodialysis.  Dialysis Access cannulated without significant issue.  Vital signs are stable.  She is on oxygen by nasal cannula with oxygen saturation is 90 to 92%.  I did speak with the patient about asking to use oxygen when she is in her home dialysis unit.  No chest pain or pressure today.  No swelling of the extremities.  Her weight is about 2.5 kg above her outpatient dry weight.  I personally reviewed the labs with dialysis personnel at the bedside.        Objective:  CURRENT TEMPERATURE:  Temp: 96.9 °F (36.1 °C)  MAXIMUM TEMPERATURE OVER 24HRS:  Temp (24hrs), Av.3 °F (36.3 °C), Min:96.9 °F (36.1 °C), Max:97.9 °F (36.6 °C)    CURRENT RESPIRATORY RATE:  Respirations: 24  CURRENT PULSE:  Pulse: 78  CURRENT BLOOD PRESSURE:  BP: 108/61  24HR BLOOD PRESSURE RANGE:  Systolic (24hrs), Av , Min:93 , Max:135   ; Diastolic (24hrs), Av, Min:41, Max:76    24HR INTAKE/OUTPUT:    Intake/Output Summary (Last 24 hours) at 2024 1046  Last data filed at 2024 0500  Gross per 24 hour   Intake 485 ml   Output 300 ml   Net 185 ml     Patient Vitals for the past 96 hrs (Last 3 readings):   Weight   24 0400 115.9 kg (255 lb 8 oz)   24 0130 114.5 kg (252 lb 8 oz)   24 0400 114.5 kg (252 lb 8 oz)         Physical Exam:  General appearance:Awake, alert, in no acute distress  Skin: warm and dry, no rash or erythema  Eyes: conjunctivae pale and sclera anicteric  ENT:no thrush, moist mucous membranes  Neck: No JVD, midline trachea no accessory muscle use  Pulmonary: clear to auscultation and no wheezing or rhonchi   Cardiovascular: Regular rate and rhythm with positive S1 and S2 and no rubs  Abdomen: soft nontender, bowel sounds present, no ascites   Extremities: no cyanosis, clubbing or edema with 2+ DP pulses bilaterally    Access:  previous  Left AV fistula    Current Medications:    insulin lispro (HUMALOG,ADMELOG) 
Nephrology Progress Note        Subjective: Patient is seen and examined.  She had hemodialysis yesterday with post weight consistent with her dry weight.  She is on 1 liter of oxygen by nasal cannula with O2 sat of 94%.  Dialysis Access cannulated without significant issue.   I did speak with the patient about asking to use oxygen when she is in her home dialysis unit.  No chest pain or pressure today.  No swelling of the extremities.   Labs ordered for tomorrow prior to her dialysis.        Objective:  CURRENT TEMPERATURE:  Temp: 98.1 °F (36.7 °C)  MAXIMUM TEMPERATURE OVER 24HRS:  Temp (24hrs), Av.2 °F (36.2 °C), Min:96.8 °F (36 °C), Max:98.1 °F (36.7 °C)    CURRENT RESPIRATORY RATE:  Respirations: 15  CURRENT PULSE:  Pulse: 71  CURRENT BLOOD PRESSURE:  BP: (!) 116/34  24HR BLOOD PRESSURE RANGE:  Systolic (24hrs), Av , Min:97 , Max:130   ; Diastolic (24hrs), Av, Min:30, Max:63    24HR INTAKE/OUTPUT:    Intake/Output Summary (Last 24 hours) at 2024 0934  Last data filed at 2024 0014  Gross per 24 hour   Intake 660 ml   Output 2700 ml   Net -2040 ml     Patient Vitals for the past 96 hrs (Last 3 readings):   Weight   24 0400 112.5 kg (248 lb)   24 1323 113.5 kg (250 lb 3.6 oz)   24 0948 115.8 kg (255 lb 4.7 oz)         Physical Exam:  General appearance:Awake, alert, in no acute distress  Skin: warm and dry, no rash or erythema  Eyes: conjunctivae pale and sclera anicteric  ENT:no thrush, moist mucous membranes  Neck: No JVD, midline trachea no accessory muscle use  Pulmonary: clear to auscultation bilaterally and no wheezing or rhonchi   Cardiovascular: Regular rate and rhythm with positive S1 and S2 and no rubs  Abdomen: soft nontender, bowel sounds present, no ascites   Extremities: no cyanosis or edema with 2+ DP pulses bilaterally    Access:  previous  Left AV fistula    Current Medications:    sodium chloride (OCEAN, BABY AYR) 0.65 % nasal spray 1 spray, Q2H 
Nephrology Progress Note        Subjective: The patient is seen and examined on hemodialysis.  With goal fluid removal to get the patient to about the dry weight of 113.5 kg.  There was hypotension this morning.  The patient is on carvedilol which was held.  The patient is also on Bumex 3 mg oral twice daily which was held.  I instructed the patient not to take Bumex on dialysis days and to decrease the Bumex on nondialysis days to 3 mg oral daily.  We will discuss with her new nephrologist, Dr. Graham, my partner.  He sees her at [facility] dialysis.  Patient is waiting for pre-CERT to get back to the her ECF.     Labs were drawn earlier today but they were clotted.  Unfortunately, the patient was put on dialysis before new labs could be drawn.  Thus, I instructed not to have labs drawn today secondary to their obvious inaccuracy if drawn during dialysis.  No chest pain or shortness of breath or nausea or vomiting today.  No belly pain.  No significant edema.     Dialysis bath reviewed.  I personally added to the dialysis orders in the patient record.       Objective:  CURRENT TEMPERATURE:  Temp: 97.4 °F (36.3 °C)  MAXIMUM TEMPERATURE OVER 24HRS:  Temp (24hrs), Av.5 °F (36.4 °C), Min:97.1 °F (36.2 °C), Max:98.1 °F (36.7 °C)    CURRENT RESPIRATORY RATE:  Respirations: 16  CURRENT PULSE:  Pulse: 79  CURRENT BLOOD PRESSURE:  BP: (!) 115/43  24HR BLOOD PRESSURE RANGE:  Systolic (24hrs), Av , Min:87 , Max:129   ; Diastolic (24hrs), Av, Min:30, Max:101    24HR INTAKE/OUTPUT:    Intake/Output Summary (Last 24 hours) at 2024 1146  Last data filed at 2024 2216  Gross per 24 hour   Intake --   Output 400 ml   Net -400 ml     Patient Vitals for the past 96 hrs (Last 3 readings):   Weight   24 0400 113.4 kg (250 lb)   24 0400 112.5 kg (248 lb)   24 1323 113.5 kg (250 lb 3.6 oz)         Physical Exam:  General appearance:Awake, alert, in no acute distress  Skin: warm and dry, no 
Nutrition Assessment     Type and Reason for Visit: RD Nutrition Re-Screen/LOS (Length of stay)    Nutrition Recommendations/Plan:   ADULT DIET; Regular; 4 carb choices (60 gm/meal); Low Fat/Low Chol/High Fiber/JESUS MANUEL; Low Potassium (Less than 3000 mg/day); Low Phosphorus (Less than 1000 mg); 1500 ml   Ensure Clear 2x/ay  Monitor p.o intakes and labs     Malnutrition Assessment:  Malnutrition Status: At risk for malnutrition (Comment)    Nutrition Assessment:  Patient assessed for length of stay. Patient admitted for ESRD and need for dialysis. AT time of visit patient was receiving hemodialysis. Patient reports a good appetite and is eating well at meals. Patient reports eating most of her food at most meals. This morning patient ate 100% of breakfast. Patient has increased nutrition needs dueto hemodialysis and right radial injury (arm in brace). Continue current diet. Will stat Ensure Clear 2x/day. Monitor p.o intakes and labs.    Estimated Daily Nutrient Needs:  Energy (kcal):  1700 kcal (15 kcal/kg) Weight Used for Energy Requirements: Current     Protein (g):  74-86 gm of protein (1.3-1.5 gm/kg) Weight Used for Protein Requirements: Ideal        Fluid (ml/day):    Method Used for Fluid Requirements: 1 ml/kcal    Nutrition Related Findings:   Edema: +1 BLE. Right radial traumatic, splint- in brace. LBS 7/25. Labs: Phos: 6.2 (H), BUN/Cr: 35/4.0 (H) (7/26)      Current Nutrition Therapies:    ADULT DIET; Regular; 4 carb choices (60 gm/meal); Low Fat/Low Chol/High Fiber/JESUS MANUEL; Low Potassium (Less than 3000 mg/day); Low Phosphorus (Less than 1000 mg); 1500 ml    Anthropometric Measures:  Height: 165.1 cm (5' 5\")  Current Body Wt: 113.4 kg (250 lb)   BMI: 41.6    Nutrition Diagnosis:   Increased nutrient needs related to increase demand for energy/nutrients as evidenced by dialysis (radial injury)    Nutrition Interventions:   Food and/or Nutrient Delivery: Continue Current Diet, Start Oral Nutrition 
Occupational Therapy  DATE: 2024    NAME: Estefany Garcia  MRN: [id number]   : 1960    Patient not seen this date for Occupational  Therapy due to:      [] Cancel by RN or physician due to:    [x] Hemodialysis    [] Critical Lab Value Level     [] Blood transfusion in progress    [] Acute or unstable cardiovascular status   _MAP < 55 or more than >115  _HR < 40 or > 130    [] Acute or unstable pulmonary status   -FiO2 > 60%   _RR < 5 or >40    _O2 sats < 85%    [] Strict Bedrest    [] Off Unit for surgery or procedure    [] Off Unit for testing       [] Pending imaging to R/O fracture    [] Refusal by Patient      [] Other      [] OT being discontinued at this time. Patient independent. No further needs.     [] OT being discontinued at this time as the patient has been transferred to hospice care. No further needs.      YULIA PUGH, SYBIL   
Occupational Therapy  DATE: 2024    NAME: Estefany Garcia  MRN: [id number]   : 1960    Patient not seen this date for Occupational Therapy due to:      [] Cancel by RN or physician due to:    [x] Hemodialysis    [] Critical Lab Value Level     [] Blood transfusion in progress    [] Acute or unstable cardiovascular status   _MAP < 55 or more than >115  _HR < 40 or > 130    [] Acute or unstable pulmonary status   -FiO2 > 60%   _RR < 5 or >40    _O2 sats < 85%    [] Strict Bedrest    [] Off Unit for surgery or procedure    [] Off Unit for testing       [] Pending imaging to R/O fracture    [] Refusal by Patient      [] Other      [] OT being discontinued at this time. Patient independent. No further needs.     [] OT being discontinued at this time as the patient has been transferred to hospice care. No further needs.      YULIA PUGH SYBIL   
Occupational Therapy  DATE: 2024    NAME: Estefany Garcia  MRN: [id number]   : 1960    Patient not seen this date for Occupational Therapy due to:      [] Cancel by RN or physician due to:    [x] Hemodialysis    [] Critical Lab Value Level     [] Blood transfusion in progress    [] Acute or unstable cardiovascular status   _MAP < 55 or more than >115  _HR < 40 or > 130    [] Acute or unstable pulmonary status   -FiO2 > 60%   _RR < 5 or >40    _O2 sats < 85%    [] Strict Bedrest    [] Off Unit for surgery or procedure    [] Off Unit for testing       [] Pending imaging to R/O fracture    [] Refusal by Patient      [] Other      [] OT being discontinued at this time. Patient independent. No further needs.     [] OT being discontinued at this time as the patient has been transferred to hospice care. No further needs.      YULIA PUGH SYBIL   
Occupational Therapy  Facility/Department: [facility]  Occupational Therapy Initial Assessment    Name: Estefany Garcia  : 1960  MRN: [id number]  Date of Service: 2024    RN reports patient is medically stable for therapy treatment this date.    Chart reviewed prior to treatment and patient is agreeable for therapy.  All lines intact and patient positioned comfortably at end of treatment.  All patient needs addressed prior to ending therapy session.      Discharge Recommendations:  Patient would benefit from continued therapy after discharge  Pt currently functioning below baseline.  Recommend daily inpatient skilled therapy at time of discharge to maximize long term outcomes and prevent re-admission. Please refer to AM-PAC score for current level of function.    OT Equipment Recommendations  Equipment Needed: Yes  Mobility Devices: ADL Assistive Devices  ADL Assistive Devices: Emergency Alert System;Long-handled Shoe Horn;Long-handled Sponge;Reacher;Sock-Aid Hard       Patient Diagnosis(es): There were no encounter diagnoses.  Past Medical History:  has a past medical history of Arthritis, Backache, unspecified, CAD (coronary artery disease), Cerebral artery occlusion with cerebral infarction ([facility]), CHF (congestive heart failure) ([facility]), Chronic kidney disease, Coronary atherosclerosis of artery bypass graft, COVID, Cramp of limb, Epistaxis, Gallstones, Hemodialysis patient ([facility]), Hyperlipidemia, Hypertension, Insomnia, Neuromuscular disorder (H[facility]), Pneumonia, Psychiatric problem, Thyroid disease, Type II or unspecified type diabetes mellitus with renal manifestations, not stated as uncontrolled(250.40), Type II or unspecified type diabetes mellitus without mention of complication, not stated as uncontrolled, and Unspecified vitamin D deficiency.  Past Surgical History:  has a past surgical history that includes Coronary artery bypass graft (2005); Knee arthroscopy; Carpal tunnel release; Breast 
Occupational Therapy  [facility]  Occupational Therapy Not Seen    DATE: 2024    NAME: Estefany Garcia  MRN: [id number]   : 1960    Patient not seen this date for Occupational Therapy due to:      [] Cancel by RN or physician due to:    [] Hemodialysis    [] Critical Lab Value Level     [] Blood transfusion in progress    [] Acute or unstable cardiovascular status   _MAP < 55 or more than >115  _HR < 40 or > 130    [] Acute or unstable pulmonary status   -FiO2 > 60%   _RR < 5 or >40    _O2 sats < 85%    [] Strict Bedrest    [] Off Unit for surgery or procedure    [] Off Unit for testing       [] Pending imaging to R/O fracture    [] Refusal by Patient      [x] Other (Pt currently working with PT).       [] OT being discontinued at this time. Patient independent. No further needs.     [] OT being discontinued at this time as the patient has been transferred to hospice care. No further needs.      BIANCA RICKS SYBIL   
Patient arrived from [place] [facility]. Vitals obtained and patient is on 3 liters NC.   
Physical Therapy  DATE: 2024    NAME: Estefany Garcia  MRN: [id number]   : 1960    Patient not seen this date for Physical Therapy due to:      [] Cancel by RN or physician due to:    [x] Hemodialysis     [] Critical Lab Value Level     [] Blood transfusion in progress    [] Acute or unstable cardiovascular status   _MAP < 55 or more than >115  _HR < 40 or > 130    [] Acute or unstable pulmonary status   -FiO2 > 60%   _RR < 5 or >40    _O2 sats < 85%    [] Strict Bedrest    [] Off Unit for surgery or procedure    [] Off Unit for testing       [] Pending imaging to R/O fracture    [] Refusal by Patient      [] Other      [] PT being discontinued at this time. Patient independent. No further needs.     [] PT being discontinued at this time as the patient has been transferred to hospice care. No further needs.      Edna Galicia, PTA     
Physical Therapy  Facility/Department: [facility]  Rehabilitation Physical Therapy Treatment Note    NAME: Estefany Garcia  : 1960 (64 y.o.)  MRN: [id number]  CODE STATUS: Full Code    Date of Service: 24       Restrictions:  Restrictions/Precautions: General Precautions, Fall Risk, Contact Precautions, Weight Bearing  Upper Extremity Weight Bearing Restrictions  Right Upper Extremity Weight Bearing: Non Weight Bearing  Required Braces or Orthoses  Right Upper Extremity Brace/Splint:  (Splint)  Position Activity Restriction  Other position/activity restrictions: Up w/ assist, O2 NC, ext cath     SUBJECTIVE  Subjective  Subjective: Pt agreeable to PT session in bed               OBJECTIVE  Cognition  Overall Cognitive Status: Exceptions  Following Commands: Follows multistep commands with repitition  Problem Solving: Decreased awareness of errors;Assistance required to correct errors made;Assistance required to identify errors made  Insights: Decreased awareness of deficits  Initiation: Requires cues for some  Sequencing: Requires cues for some  Cognition Comment: Pt. friendly and cooperative with treatment.  Orientation  Overall Orientation Status: Within Functional Limits    Functional Mobility  Bed Mobility  Overall Assistance Level: Minimal Assistance  Roll Left  Assistance Level: Minimal assistance  Roll Right  Assistance Level: Minimal assistance  Sit to Supine  Assistance Level: Minimal assistance  Supine to Sit  Assistance Level: Minimal assistance  Scooting  Assistance Level: Minimal assistance  Transfers  Surface: From bed;To chair with arms  Additional Factors: Hand placement cues;Verbal cues  Sit to Stand  Assistance Level: Minimal assistance  Stand to Sit  Assistance Level: Minimal assistance  Bed To/From Chair  Technique: Stand step;Stand pivot  Assistance Level: Minimal assistance  Stand Pivot  Assistance Level: Minimal assistance      Environmental Mobility  Ambulation  Surface: Level 
Physical Therapy  Facility/Department: [facility]  Daily Treatment Note  NAME: Estefany Garcia  : 1960  MRN: [id number]    Date of Service: 2024    Discharge Recommendations:  Patient would benefit from continued therapy after discharge   Patient Diagnosis(es): There were no encounter diagnoses.  Assessment   Assessment: Pt with fatigue from dialysis and  deficits in strength, endurance and balance requiring min A  for mobilty this session. Pt required rest breaks with deep breathing througout  PT session to maintain SpO2 @90% and above with mobility. Pt is currently functioning below baseline as she is not ambulating household distances yet. Pt would benefit from continued skilled therapy to maximize returnt to PLOF.  Activity Tolerance: Patient tolerated treatment well   Plan    Physical Therapy Plan  General Plan: 5-7 times per week  Current Treatment Recommendations: Strengthening;ROM;Balance training;Functional mobility training;Transfer training;Gait training;Neuromuscular re-education;Home exercise program;Endurance training;Pain management;Safety education & training;Patient/Caregiver education & training;Equipment evaluation, education, & procurement;Therapeutic activities   Restrictions  Restrictions/Precautions  Restrictions/Precautions: General Precautions, Fall Risk, Contact Precautions, Weight Bearing  Required Braces or Orthoses?: Yes  Upper Extremity Weight Bearing Restrictions  Right Upper Extremity Weight Bearing: Non Weight Bearing  Required Braces or Orthoses  Right Upper Extremity Brace/Splint:  (Splint)  Position Activity Restriction  Other position/activity restrictions: Up w/ assist, O2 NC, ext cath   Subjective    Subjective  Subjective: Pt  is agreeable to PT session (Nurse gives approval for PT session)  Orientation  Overall Orientation Status: Within Functional Limits     Objective   Vitals  Pulse: 67  Heart Rate Source: Monitor  BP: (!) 108/42  BP Location: Left leg  BP 
Physical Therapy  [facility]  Daily [facility]  NAME: Estefany Garcia  : 1960  MRN: [id number]    Date of Service: 2024    Discharge Recommendations:  Patient would benefit from continued therapy after discharge   Patient Diagnosis(es): There were no encounter diagnoses.  Assessment   Assessment: Pt progressing towards goals however continues to require min to mod A for mobility. Pt also with decreased endurance and  activity tolerance in standing limiting gait distance. Pt is a high fall risk and  would benefit from continued skilled therapy to increase strength,endurance , and balance needed to safely complete ADLs  Activity Tolerance: Patient limited by endurance;Patient tolerated treatment well   Plan    Physical Therapy Plan  General Plan: 5-7 times per week  Current Treatment Recommendations: Strengthening;ROM;Balance training;Functional mobility training;Transfer training;Gait training;Neuromuscular re-education;Home exercise program;Endurance training;Pain management;Safety education & training;Patient/Caregiver education & training;Equipment evaluation, education, & procurement;Therapeutic activities   Restrictions  Restrictions/Precautions  Restrictions/Precautions: General Precautions, Fall Risk, Contact Precautions, Weight Bearing  Required Braces or Orthoses?: Yes  Upper Extremity Weight Bearing Restrictions  Right Upper Extremity Weight Bearing: Non Weight Bearing  Required Braces or Orthoses  Right Upper Extremity Brace/Splint:  (Splint)  Position Activity Restriction  Other position/activity restrictions: Up w/ assist, O2 NC, ext cath   Subjective    Subjective  Subjective: Pt agreeable to PT session (Nurse gives approval for PT session)  Orientation  Overall Orientation Status: Within Functional Limits   Objective   Vitals  114/51 (MAP 66)  SpO2 95%    Bed Mobility Training  Bed Mobility Training: Yes  Overall Level of Assistance: Minimum assistance;Moderate 
[facility]  Office: [phone]  Laith Shirley, DO, Eric Nicholson, DO, Madhu Rowland DO, Sergio Gill, DO, Dinh Graham MD, Mirela Knowles MD, Wilmer Francois MD, Nancy Bullock MD,  Shaw Correa MD, Heladio Sanchez MD, Genaro Hoyt MD,  Gini Humphrey DO, Franklin Anthony MD, Ang Jorgensen MD, Van Shirley DO, Richa Godinez MD,  Rakesh Curtis DO, Quiana Soares MD, Liset Reyez MD, Irlanda Knight MD, Anders Call MD,  Anirudh Dias MD, Charissa Driver MD, John Bell MD, Donnie Mancilla MD, Yinka Caputo MD, Ehsan Patel MD, Marcelino De La Vega DO, Benny Estrada DO, Jenny Hawley MD,  Bao Torres MD, Shirley Waterhouse, CNP,  Christine Boss CNP, Adrián Smyth, CNP,  Estefany Stallings, ARIEL, Viji Velazquez, CNP, Carla Rae, CNP, Oneyda Marsh CNP, Danielle Gamble, CNP, Katherin Brewer, PA-C, Sarita Prakash PA-C, Renuka Alba, CNP, Regine Snow, CNP, Mesha Singh, CNP, Megan Carpio, CNP, Libby Massey CNP, Pat Lombardo, CNS, Karla Gutiérrez, CNP, Ami Laguerre CNP, Tracy Schwab, CNP         [facility]   IN-PATIENT SERVICE   [facility]    Progress Note    7/21/2024    7:33 AM    Name:   Estefany Garcia  MRN:     [id number]     Acct:      [id number]   Room:   2032/2032-01  IP Day:  2  Admit Date:  7/19/2024  6:28 PM    PCP:   Zulma Payne APRN - CNP  Code Status:  Full Code    Subjective:     C/C: Chest pressure    Interval History Status: improved.     Patient is resting in bed and denies any complaints of chest pain, shortness of breath, nausea vomiting, fevers chills or acute complaints.    Brief History:     This is a 64-year-old female with known history of coronary disease and end-stage renal disease that developed chest pressure on her way to her dialysis treatment.  Ultimately she was transferred from dialysis to the emergency room where she was found to have elevated troponins, elevation is chronic and below her baseline.  There are 
Pt is not able to have her home unit brought in..  
Report called to DON at [facility].   
SPIRITUAL CARE DEPARTMENT [facility]  PROGRESS NOTE    Room # 2032/2032-01   Name: Estefany Garcia            Mormonism: Orthodoxy     Reason for visit:  Social Isolation    I visited the patient.    Admit Date & Time: 7/19/2024  6:28 PM    Assessment:  Estefany Garcia is a 64 y.o. female in the hospital because [facility]. Upon entering the room Pt. Was tired from dialysis.  Writer deemed short visit the best for Pt. At that moment.      Intervention:  I introduced myself and my title as  I offered space for Pt.  to express feelings, needs, and concerns and provided a ministry presence.  Writer engaged in Prayer with Pt.    Outcome:  Pt. Tanked Writer for Visit.    Plan:  Chaplains will remain available to offer spiritual and emotional support as needed.    Electronically signed by Chaplain Ermelinda, on 7/22/2024 at 7:46 PM.  Spiritual Care Department  [facility]       07/22/24 1943   Encounter Summary   Encounter Overview/Reason Spiritual/Emotional Needs;Loneliness/Social Isolation   Service Provided For Patient   Referral/Consult From Other (comment)  (Social Isolation)   Support System Unknown   Last Encounter  07/22/24   Complexity of Encounter Low   Begin Time 1935   End Time  1940   Total Time Calculated 5 min   Spiritual/Emotional needs   Type Spiritual Support   Assessment/Intervention/Outcome   Assessment Calm;Coping;Hopeful;Passive   Intervention Active listening;Prayer (assurance of)/[place];Sustaining Presence/Ministry of presence   Outcome Connection/Belonging;Expressed Gratitude;Engaged in conversation   Plan and Referrals   Plan/Referrals No future visits requested       
[facility] [facility]  Office: [phone]  Laith Shirley DO, Eric Nicholson DO, Madhu Rowland DO, Sergio Gill DO, Dinh Graham MD, Mirela Knowles MD, Wilmer Francois MD, Nancy Bullock MD,  Shaw Correa MD, Heladio Sanchez MD, Genaro Hoyt MD,  Gini Humphrey DO, Franklin Anthony MD, Ang Jorgensen MD, Van Shirley DO, Richa Godinez MD,  Rakesh Curtis DO, Quiana Soares MD, Liset Reyez MD, Irlanda Knight MD, Anders Call MD,  Anirudh Dias MD, Charissa Driver MD, John Bell MD, Donnie Mancilla MD, Yinka Caputo MD, Ehsan Patel MD, Marcelino De La Vega DO, Benny Estrada DO, Jenny Hawlye MD,  Bao Torres MD, Shirley Waterhouse, CNP,  Christine Boss CNP, Adrián Smyth, CNP,  Estefany Stallings, ARIEL, Viji Velazquez, CNP, Carla Rae, CNP, Oneyda Marsh CNP, Danielle Gamble, CNP, Katherin Brewer, PA-C, Sarita Prakash PA-C, Renuka Alba, CNP, Regine Snow, CNP, Mesha Singh, CNP, Megan Carpio, CNP, Libby Massey CNP, Pat Lombardo, CNS, Karla Gutiérrez, CNP, Ami Laguerre CNP, Tracy Schwab, CNP       [facility]      Daily Progress Note     Admit Date: 7/19/2024  Bed/Room No.  2032/2032-01  Admitting Physician : Wilmer Francois MD  Code Status :Full Code  Hospital Day:  LOS: 0 days   Chief Complaint:     Chest pain      Principal Problem:    Angina at rest ([facility])  Active Problems:    Renovascular hypertension    S/P angioplasty with stent    Depression with anxiety    ESRD on hemodialysis (HCC)    Class 2 severe obesity due to excess calories with serious comorbidity and body mass index (BMI) of 35.0 to 35.9 in adult (HCC)    Type 2 diabetes mellitus with hyperglycemia, with long-term current use of insulin (HCC)    Secondary hyperparathyroidism (HCC)    Congestive heart failure (HCC)    History of coronary artery bypass graft    Iron deficiency anemia    Mixed hyperlipidemia    Ischemic stroke of frontal lobe (HCC)    Disequilibrium syndrome    ERICK 
[facility]  Office: [phone]  Laith Shirley, DO, Eric Nicholson, DO, Madhu Rowland DO, Sergio Gill, DO, Dinh Graham MD, Mirela Knowles MD, Wilmer Francois MD, Nancy Bullock MD,  Shaw Correa MD, Heladio Sanchez MD, Genaro Hoyt MD,  Gini Humphrey DO, Franklin Anthony MD, Ang Jorgensen MD, Van Shirley DO, Richa Godinez MD,  Rakesh Curtis DO, Quiana Soares MD, Liset Reyez MD, Irlanda Knight MD, Anders Call MD,  Anirudh Dias MD, Charissa Driver MD, John Bell MD, Donnie Mancilla MD, Yinka Caputo MD, Ehsan Patel MD, Marcelino De La Vega DO, Benny Estrada DO, Jenny Hawley MD,  Bao Torres MD, Shirley Waterhouse, CNP,  Christine Boss CNP, Adrián Smyth, CNP,  Estefany Stallings, DNP, Viji Velazquez, CNP, Carla Rae, CNP, Oneyda Marsh CNP, Danielle Gamble, CNP, Katherin Brewer, PA-C, Sarita Prakash PA-C, Renuka Alba, CNP, Regine Snow, CNP, Mesha Singh, CNP, Megan Carpio, CNP, Libby Massey CNP, Pat Lombardo, CNS, Karla Gutiérrez, CNP, Ami Laguerre CNP, Tracy Schwab, CNP         [facility]   IN-PATIENT SERVICE   [facility]    Progress Note    7/20/2024    7:07 AM    Name:   Estefany Garcia  MRN:     [id number]     Acct:      [id number]   Room:   2032/2032-01   Day:  1  Admit Date:  7/19/2024  6:28 PM    PCP:   Zulma Payne APRN - CNP  Code Status:  Full Code    Subjective:     C/C: Chest pressure    Interval History Status: improved.     Patient is resting in bed denies any complaints of chest pain, shortness of breath, nausea vomiting, fevers chills or acute complaints.    Brief History:     This is a 64-year-old female with known history of coronary disease and end-stage renal disease that developed chest pressure on her way to her dialysis treatment.  Ultimately she was transferred from dialysis to the emergency room where she was found to have elevated troponins, elevation is chronic and below her baseline.  There are no 
Spoke with patient about bringing her home CPAP unit in from home. Will follow up today  
Spoke with pharmacy regarding imdur/nitro allergy and patient has ordered nitro patch. Writer verified with patient and patient stated she \"does not have allergy to imdur, nitro, or any derivatives, and takes nitro at home\"     Allergies to be removed from chart and nitro patch ok to give.  
[facility]  Office: [phone]  Laith Shirley DO, Eric Nicholson DO, Madhu Rowland DO, Sergio Gill DO, Dinh Graham MD, Mirela Knowles MD, Wilmer Francois MD, Nancy Bullock MD,  Shaw Crorea MD, Heladio Sanchez MD, Genaro Hoyt MD,  Gini Humphrey DO, Franklin Anthony MD, Ang Jorgensen MD, Van Shirley DO, Richa Godinez MD,  Rakesh Curtis DO, Quiana Soares MD, Liset Reyez MD, Irlanda Knight MD, Anders Call MD,  Anirudh Dias MD, Charissa Driver MD, John Bell MD, Donnie Mancilla MD, Yinka Caputo MD, Ehsan Patel MD, Marcelino De La Vega DO, Benny Estrada DO, Jenny Hawley MD,  Bao Torres MD, Shirley Waterhouse, CNP,  Christine Boss CNP, Adrián Smyth, CNP,  Estefany Stallings, ARIEL, Viji Velazquez, CNP, Carla Rae, CNP, Oneyda Marsh CNP, Danielle Gamble, CNP, Katherin Brewer, PA-C, Sarita Prakash PA-C, Renuka Alba, CNP, Regine Snow, CNP, Mesha Singh, CNP, Megan Carpio, CNP, Libby Massey CNP, Pat Lombardo, CNS, Karla Gutiérrez, CNP, Ami Laguerre CNP, Tracy Schwab, CNP       [facility]      Daily Progress Note     Admit Date: 7/19/2024  Bed/Room No.  2032/2032-01  Admitting Physician : Wilmer Francois MD  Code Status :Full Code  Hospital Day:  LOS: 0 days   Chief Complaint:     Chest pain      Principal Problem:    Angina at rest ([facility])  Active Problems:    Renovascular hypertension    S/P angioplasty with stent    Depression with anxiety    ESRD on hemodialysis (HCC)    Class 2 severe obesity due to excess calories with serious comorbidity and body mass index (BMI) of 35.0 to 35.9 in adult (HCC)    Type 2 diabetes mellitus with hyperglycemia, with long-term current use of insulin (HCC)    Secondary hyperparathyroidism (HCC)    Congestive heart failure (HCC)    History of coronary artery bypass graft    Iron deficiency anemia    Mixed hyperlipidemia    Ischemic stroke of frontal lobe (HCC)    Disequilibrium syndrome    ERICK 
[facility]  Office: [phone]  Laith Shirley, DO, Eric Nicholson, DO, Madhu Rowland DO, Sergio Gill, DO, Dinh Graham MD, Mirela Knowles MD, Wilmer Francois MD, Nancy Bullock MD,  Shaw Correa MD, Heladio Sanchez MD, Genaro Hoyt MD,  Gini Humphrey DO, Franklin Anthony MD, Ang Jorgensen MD, Van Shirley DO, Richa Godinez MD,  Rakesh Curtis DO, Quiana Soares MD, Liset Reyez MD, Irlanda Knight MD, Anders Call MD,  Anirudh Dias MD, Charissa Driver MD, John Bell MD, Donnie Mancilla MD, Yinka Caputo MD, Ehsan Patel MD, Marcelino De La Vega DO, Benny Estrada DO, Jenny Hawley MD,  Bao Torres MD, Shirley Waterhouse, CNP,  Christine Boss CNP, Adrián Smyth, CNP,  Estefany Stallings, ARIEL, Viji Velazquez, CNP, Carla Rae, CNP, Oneyda Marsh CNP, Danielle Gamble, CNP, Katherin Brewer, PA-C, Sarita Prakash PA-C, Renuka Alba, CNP, Regine Snow, CNP, Mesha iSngh, CNP, Megan Carpio, CNP, Libby Massey CNP, Pat Lombardo, CNS, Karla Gutiérrez, CNP, Ami Laguerre CNP, Tracy Schwab, CNP         [facility]   IN-PATIENT SERVICE   [facility]    Progress Note    7/22/2024    12:30 PM    Name:   Estefany Gacria  MRN:     [id number]     Acct:      [id number]   Room:   2032/2032-01   Day:  3  Admit Date:  7/19/2024  6:28 PM    PCP:   Zulma Payne APRN - CNP  Code Status:  Full Code    Subjective:     C/C: Chest pressure    Interval History Status: improved.     Patient is resting in bed and denies any complaints of chest pain, shortness of breath, nausea vomiting, fevers chills or acute complaints.she is currently undergoing dialysis   VSS   Brief History:     This is a 64-year-old female with known history of coronary disease and end-stage renal disease that developed chest pressure on her way to her dialysis treatment.  Ultimately she was transferred from dialysis to the emergency room where she was found to have elevated troponins, elevation is 
100 MG tablet, Take 1 tablet by mouth daily  docusate sodium (COLACE) 100 MG capsule, Take 1 capsule by mouth 2 times daily  insulin aspart (NOVOLOG FLEXPEN) 100 UNIT/ML injection pen, Inject 20 Units into the skin 3 times daily (before meals) Inject 20 units into the skin 3 times daily, before meals.  Additionally sliding scale coverage as needed 3 times a day: for blood sugar  no additional insulin.  Sugar 140-199 give 3 units.  Sugar 200-249 give 6 units.  Sugar 250-299 give 9 units.  Sugar 300-3 49 give 12 units.  Sugar 3  give 15 units.  Sugar over 400 give 18 units.  Maximum allowable amount 103 units daily  sevelamer (RENVELA) 800 MG tablet, Take 2 tablets by mouth 3 times daily (with meals)  Magnesium 400 MG CAPS, Take 1 capsule by mouth daily  venlafaxine (EFFEXOR XR) 75 MG extended release capsule, TAKE 1 CAPSULE BY MOUTH ONCE DAILY WITH BREAKFAST  prasugrel (EFFIENT) 10 MG TABS, Take 1 tablet by mouth daily  polyvinyl alcohol (LIQUIFILM TEARS) 1.4 % ophthalmic solution, Place 1 drop into both eyes as needed for Dry Eyes  Insulin Pen Needle (EASY TOUCH PEN NEEDLES) 29G X 12MM MISC, 5 each by Does not apply route daily  nitroGLYCERIN (NITROSTAT) 0.4 MG SL tablet, Place 1 tablet under the tongue every 5 minutes as needed for Chest pain up to max of 3 total doses. If no relief after 1 dose, call 911.  Continuous Blood Gluc  (DEXCOM G6 ) KODY, 1 each by Does not apply route 4 times daily  aspirin 81 MG chewable tablet, Take 1 tablet by mouth daily  acetaminophen (TYLENOL) 325 MG tablet, Take 2 tablets by mouth every 6 hours as needed for Pain  busPIRone (BUSPAR) 10 MG tablet, Take 1 tablet by mouth 3 times daily  midodrine (PROAMATINE) 5 MG tablet, Take 1 tablet by mouth as needed (up to 2 tablets as needed for SBP <100 during dialysis)  Melatonin 10 MG TABS, Take 10 mg by mouth nightly as needed (for sleep) Indications: Trouble Sleeping  Calcium Polycarbophil (FIBER-CAPS PO), Take 
Pt. seen at bedside d/t just returning from dialysis. Pt. completed functional ROM to both UE and completed various exercises with success. Skilled OT warranted to promote I/safety to return pt. to prior living arrangement with assist as needed.  Activity Tolerance: Patient tolerated treatment well  Discharge Recommendations: Patient would benefit from continued therapy after discharge  OT Equipment Recommendations  ADL Assistive Devices: Emergency Alert System;Long-handled Shoe Horn;Long-handled Sponge;Reacher;Sock-Aid Hard  Safety Devices  Safety Devices in place: Yes  Type of devices: Bed alarm in place;Call light within reach;Nurse notified;Left in bed  Restraints  Initially in place: No    Patient Education  Education  Education Given To: Patient  Education Provided: Role of Therapy;Plan of Care;Safety;Mobility Training;Transfer Training;Precautions  Education Provided Comments: Pt. educated on the importance of following Dr. Simpson to promote proper healing of R UE. Pt. very attentive and receptive to all education.  Education Method: Demonstration;Verbal  Barriers to Learning: None  Education Outcome: Verbalized understanding;Continued education needed    Plan  Occupational Therapy Plan  Times Per Week: 4-5x/wk 1x/day as ilana  Current Treatment Recommendations: Strengthening;Balance training;Functional mobility training;Endurance training;Safety education & training;Equipment evaluation, education, & procurement;Self-Care / ADL;Home management training;Pain management  Additional Comments: Cont with stated POC    Goals  Patient Goals   Patient goals : To go home!  Short Term Goals  Time Frame for Short Term Goals: By discharge, pt to demo  Short Term Goal 1: ADL transfers and functional mobility to SBA with use of AD as needed.  Short Term Goal 2: bed mobility to SBA with use of bedrails as needed.  Short Term Goal 3: increased B UE strength by 1/2 grade to assist with functional tasks/I with B UE HEP 
chair with arms;To chair with arms  Additional Factors: Hand placement cues;Verbal cues;Set-up;Increased time to complete;With handrails  Device: Walker  Sit to Stand  Assistance Level: Minimal assistance  Skilled Clinical Factors: Min assist to stand upright from bedside chair with verbal cues on pushing up from sitting surface.  Stand to Sit  Assistance Level: Minimal assistance  Skilled Clinical Factors: Min assist to slowly reach back and control sit.         Assessment  Assessment  Assessment: Pt. completed multiple sit to stands from bedside chair with min assist. Pt. fatigued easily tolerating 3-4 min of standing with platform walker. Pt. c/o feeling dizzy and required to sit down to rest. Pt. remained motivated. Second stand completed with decreased dizziness and increased time. Skilled OT warranted to promote I/safety to return pt to prior living arrangement with assist as needed.  Activity Tolerance: Patient limited by endurance;Patient tolerated treatment well  Discharge Recommendations: Patient would benefit from continued therapy after discharge  OT Equipment Recommendations  Equipment Needed: Yes  Mobility Devices: Walker  Walker: Platform Right  ADL Assistive Devices: Emergency Alert System;Long-handled Shoe Horn;Long-handled Sponge;Reacher;Sock-Aid Hard  Safety Devices  Safety Devices in place: Yes  Type of devices: All fall risk precautions in place;Call light within reach;Gait belt;Nurse notified;Left in chair  Restraints  Initially in place: No    Patient Education  Education  Education Given To: Patient  Education Provided: Role of Therapy;Plan of Care;Safety;Mobility Training;Transfer Training;Precautions  Education Provided Comments: Pt. educated on importance of changing positions to promote functional endurance. Pt. very cooperative with treatment and receptive to information.  Education Method: Demonstration;Verbal  Barriers to Learning: None  Education Outcome: Verbalized 
meals.  Additionally sliding scale coverage as needed 3 times a day: for blood sugar  no additional insulin.  Sugar 140-199 give 3 units.  Sugar 200-249 give 6 units.  Sugar 250-299 give 9 units.  Sugar 300-3 49 give 12 units.  Sugar 3  give 15 units.  Sugar over 400 give 18 units.  Maximum allowable amount 103 units daily  sevelamer (RENVELA) 800 MG tablet, Take 2 tablets by mouth 3 times daily (with meals)  Magnesium 400 MG CAPS, Take 1 capsule by mouth daily  venlafaxine (EFFEXOR XR) 75 MG extended release capsule, TAKE 1 CAPSULE BY MOUTH ONCE DAILY WITH BREAKFAST  prasugrel (EFFIENT) 10 MG TABS, Take 1 tablet by mouth daily  polyvinyl alcohol (LIQUIFILM TEARS) 1.4 % ophthalmic solution, Place 1 drop into both eyes as needed for Dry Eyes  Insulin Pen Needle (EASY TOUCH PEN NEEDLES) 29G X 12MM MISC, 5 each by Does not apply route daily  nitroGLYCERIN (NITROSTAT) 0.4 MG SL tablet, Place 1 tablet under the tongue every 5 minutes as needed for Chest pain up to max of 3 total doses. If no relief after 1 dose, call 911.  Continuous Blood Gluc  (DEXCOM G6 ) KODY, 1 each by Does not apply route 4 times daily  aspirin 81 MG chewable tablet, Take 1 tablet by mouth daily  acetaminophen (TYLENOL) 325 MG tablet, Take 2 tablets by mouth every 6 hours as needed for Pain  busPIRone (BUSPAR) 10 MG tablet, Take 1 tablet by mouth 3 times daily  midodrine (PROAMATINE) 5 MG tablet, Take 1 tablet by mouth as needed (up to 2 tablets as needed for SBP <100 during dialysis)  Melatonin 10 MG TABS, Take 10 mg by mouth nightly as needed (for sleep) Indications: Trouble Sleeping  Calcium Polycarbophil (FIBER-CAPS PO), Take 2 capsules by mouth in the morning and at bedtime  Lancets MISC, 1 each by Does not apply route daily    EXAMINATION     Vitals /60   Pulse 73   Temp 96.9 °F (36.1 °C) (Temporal)   Resp 13   Ht 1.651 m (5' 5\")   Wt 112.5 kg (248 lb)   SpO2 99%   BMI 41.27 kg/m²   LE edema  Absent, 
2.37* 2.44*   HGB 9.1* 9.2*   HCT 28.1* 27.7*   .6* 113.5*   MCH 38.4* 37.7*   MCHC 32.4 33.2   RDW 17.8* 17.5*    167   MPV 10.7 10.7       Chemistry:  Recent Labs     07/21/24  0356 07/22/24  0509   * 134*   K 4.4 4.7   CL 96* 98   CO2 24 24   GLUCOSE 252* 203*   BUN 33* 45*   CREATININE 4.2* 4.8*   ANIONGAP 13 12   LABGLOM 11* 9*   CALCIUM 9.0 8.8       Recent Labs     07/20/24  2142 07/20/24  2348 07/21/24  0234 07/21/24  0500 07/21/24 2023 07/22/24  0738   POCGLU 403* 316* 262* 248* 296* 255*       ABG:  Lab Results   Component Value Date/Time    PHART 7.429 04/24/2022 01:42 PM    ZNJ0SXY 45.9 04/24/2022 01:42 PM    PO2ART 47.6 04/24/2022 01:42 PM    GBN7KUN 30.4 04/24/2022 01:42 PM    PBEA 6.1 04/24/2022 01:42 PM    H6ENGEHM 82.8 04/24/2022 01:42 PM    FIO2 INFORMATION NOT PROVIDED 01/25/2023 10:59 AM     Lab Results   Component Value Date/Time    SPECIAL DJU425 02/25/2023 03:15 AM     Lab Results   Component Value Date/Time    CULTURE NO SIGNIFICANT GROWTH 06/11/2024 12:00 PM       Radiology:  XR CHEST PORTABLE    Result Date: 7/19/2024  Mild bibasilar opacities most likely represent atelectasis.  Pneumonia is also possible.       Physical Examination:        General appearance:  alert, cooperative and no distress  Mental Status:  oriented to person, place and time and normal affect  Lungs:  clear to auscultation bilaterally, normal effort  Heart:  regular rate and rhythm, no murmur  Abdomen:  soft, nontender, nondistended, normal bowel sounds, no masses, hepatomegaly, splenomegaly  Extremities:  no edema, redness, tenderness in the calves  Skin:  no gross lesions, rashes, induration    Assessment:        Hospital Problems             Last Modified POA    * (Principal) Angina at rest (HCC) 7/20/2024 Yes    Renovascular hypertension 7/20/2024 Yes    S/P angioplasty with stent 7/20/2024 Yes    Depression with anxiety 7/20/2024 Yes    ESRD on hemodialysis ([facility]) 7/20/2024 Yes    Class 2 
oropharyngeal exudate.   Eyes:      General: No scleral icterus.     Conjunctiva/sclera: Conjunctivae normal.      Pupils: Pupils are equal, round, and reactive to light.   Neck:      Thyroid: No thyromegaly.      Vascular: No JVD.   Cardiovascular:      Rate and Rhythm: Normal rate and regular rhythm.      Pulses:           Dorsalis pedis pulses are 2+ on the right side and 2+ on the left side.      Heart sounds: Normal heart sounds. No murmur heard.  Pulmonary:      Effort: Pulmonary effort is normal.      Breath sounds: Normal breath sounds. No wheezing or rales.   Chest:      Comments: Sternotomy scar   Abdominal:      Palpations: Abdomen is soft. There is no mass.      Tenderness: There is no abdominal tenderness.   Musculoskeletal:      Cervical back: Full passive range of motion without pain and neck supple.   Lymphadenopathy:      Head:      Right side of head: No submandibular adenopathy.      Left side of head: No submandibular adenopathy.      Cervical: No cervical adenopathy.   Skin:     General: Skin is warm.   Neurological:      Mental Status: She is alert and oriented to person, place, and time.      Motor: No tremor.   Psychiatric:         Behavior: Behavior is cooperative.           Laboratory findings:    Recent Labs     07/24/24  0343   WBC 6.1   HGB 9.2*   HCT 28.1*          Recent Labs     07/24/24  0343      K 4.1   CL 98   CO2 25   GLUCOSE 113*   BUN 35*   CREATININE 4.0*   CALCIUM 8.7   PHOS 5.2*       No results for input(s): \"LABALBU\", \"LABA1C\", \"D7LNOVZ\", \"FT4\", \"TSH\", \"AST\", \"ALT\", \"LDH\", \"GGT\", \"ALKPHOS\", \"BILITOT\", \"BILIDIR\", \"AMMONIA\", \"AMYLASE\", \"LIPASE\", \"LACTATE\", \"CHOL\", \"HDL\", \"CHOLHDLRATIO\", \"TRIG\", \"VLDL\", \"BNP\", \"TROPONINI\", \"CKTOTAL\", \"CKMB\", \"CKMBINDEX\", \"RF\", \"RUSSELL\" in the last 72 hours.    Invalid input(s): \"PROT\", \"H9DPKWB\", \"LDLCHOLESTEROL\"       Protein, UA   Date Value Ref Range Status   06/11/2024 2+ (A) NEGATIVE mg/dL Final     RBC, UA   Date Value Ref 
Role of Therapy;Plan of Care;Transfer Training;Energy Conservation;Fall Prevention Strategies  Education Provided Comments: Pt educated on: purpose of acute PT eval, importance of continued mobility throughout admission, general safety awareness, fall risk prevention, pursed lip breathing, safe transfers w/ platform RW, and PT POC. Pt w/ fair return demo. Pt would benefit from continued reinforcement of education.  Education Method: Verbal;Demonstration  Education Outcome: Continued education needed      Therapy Time   Individual Concurrent Group Co-treatment   Time In 0841         Time Out 0915         Minutes 34+10 = 44 total          Additional 10 minutes added for chart review and RN communication   Treatment time: 24 minutes       Lyric Moreno, PT

## 2024-07-26 NOTE — FLOWSHEET NOTE
07/25/24 2341   Treatment Team Notification   Reason for Communication Review case  (blood sugar 269)   Name of Team Member Notified Danielle Mavis   Treatment Team Role Advanced Practice Nurse   Method of Communication Secure Message   Response See orders  (Humalog 4 units once)   Notification Time [id number]

## 2024-07-26 NOTE — PLAN OF CARE
Problem: Chronic Conditions and Co-morbidities  Goal: Patient's chronic conditions and co-morbidity symptoms are monitored and maintained or improved  7/21/2024 2249 by Alexandro Hill RN  Outcome: Progressing  7/21/2024 1847 by Natalie Houston RN  Outcome: Progressing     Problem: Discharge Planning  Goal: Discharge to home or other facility with appropriate resources  7/21/2024 2249 by Alexandro Hill RN  Outcome: Progressing  7/21/2024 1847 by Natalie Houston RN  Outcome: Progressing     Problem: Safety - Adult  Goal: Free from fall injury  7/21/2024 2249 by Alexandro Hill RN  Outcome: Progressing  7/21/2024 1847 by Natalie Houston RN  Outcome: Progressing     Problem: Pain  Goal: Verbalizes/displays adequate comfort level or baseline comfort level  7/21/2024 2249 by Alexandro Hill RN  Outcome: Progressing  7/21/2024 1847 by Natalie Houston RN  Outcome: Progressing     Problem: Skin/Tissue Integrity  Goal: Absence of new skin breakdown  Description: 1.  Monitor for areas of redness and/or skin breakdown  2.  Assess vascular access sites hourly  3.  Every 4-6 hours minimum:  Change oxygen saturation probe site  4.  Every 4-6 hours:  If on nasal continuous positive airway pressure, respiratory therapy assess nares and determine need for appliance change or resting period.  7/21/2024 2249 by Alexandro Hill RN  Outcome: Progressing  7/21/2024 1847 by Natalie Houston RN  Outcome: Progressing     Problem: ABCDS Injury Assessment  Goal: Absence of physical injury  7/21/2024 2249 by Alexandro Hill RN  Outcome: Progressing  7/21/2024 1847 by Natalie Houston RN  Outcome: Progressing     
  Problem: Chronic Conditions and Co-morbidities  Goal: Patient's chronic conditions and co-morbidity symptoms are monitored and maintained or improved  7/24/2024 1108 by Edwin Hoskins RN  Outcome: Progressing  7/24/2024 0605 by Oneyda Alvarez RN  Outcome: Progressing     Problem: Discharge Planning  Goal: Discharge to home or other facility with appropriate resources  7/24/2024 1108 by Edwin Hoskins RN  Outcome: Progressing  7/24/2024 0605 by Oneyda Alvarez RN  Outcome: Progressing     Problem: Safety - Adult  Goal: Free from fall injury  7/24/2024 1108 by Edwin Hoskins RN  Outcome: Progressing  7/24/2024 0605 by Oneyda Alvarez RN  Outcome: Progressing  Flowsheets (Taken 7/23/2024 2000)  Free From Fall Injury:   Instruct family/caregiver on patient safety   Based on caregiver fall risk screen, instruct family/caregiver to ask for assistance with transferring infant if caregiver noted to have fall risk factors     Problem: Pain  Goal: Verbalizes/displays adequate comfort level or baseline comfort level  7/24/2024 1108 by Edwin Hoskins RN  Outcome: Progressing  7/24/2024 0605 by Oneyda Alvarez RN  Outcome: Progressing  Flowsheets (Taken 7/23/2024 2000)  Verbalizes/displays adequate comfort level or baseline comfort level:   Encourage patient to monitor pain and request assistance   Administer analgesics based on type and severity of pain and evaluate response   Assess pain using appropriate pain scale     Problem: Skin/Tissue Integrity  Goal: Absence of new skin breakdown  Description: 1.  Monitor for areas of redness and/or skin breakdown  2.  Assess vascular access sites hourly  3.  Every 4-6 hours minimum:  Change oxygen saturation probe site  4.  Every 4-6 hours:  If on nasal continuous positive airway pressure, respiratory therapy assess nares and determine need for appliance change or resting period.  7/24/2024 1108 by Edwin Hoskins RN  Outcome: Progressing  7/24/2024 0605 by Oneyda Alvarez RN  Outcome: 
  Problem: Chronic Conditions and Co-morbidities  Goal: Patient's chronic conditions and co-morbidity symptoms are monitored and maintained or improved  7/25/2024 0954 by Edwin Hoskins RN  Outcome: Progressing  7/25/2024 0432 by Shy Inman RN  Outcome: Progressing  Flowsheets (Taken 7/24/2024 2000)  Care Plan - Patient's Chronic Conditions and Co-Morbidity Symptoms are Monitored and Maintained or Improved:   Monitor and assess patient's chronic conditions and comorbid symptoms for stability, deterioration, or improvement   Collaborate with multidisciplinary team to address chronic and comorbid conditions and prevent exacerbation or deterioration   Update acute care plan with appropriate goals if chronic or comorbid symptoms are exacerbated and prevent overall improvement and discharge     Problem: Discharge Planning  Goal: Discharge to home or other facility with appropriate resources  7/25/2024 0954 by Edwin Hoskins RN  Outcome: Progressing  7/25/2024 0432 by Shy Inman RN  Outcome: Progressing  Flowsheets (Taken 7/24/2024 2000)  Discharge to home or other facility with appropriate resources:   Identify barriers to discharge with patient and caregiver   Arrange for needed discharge resources and transportation as appropriate   Identify discharge learning needs (meds, wound care, etc)   Refer to discharge planning if patient needs post-hospital services based on physician order or complex needs related to functional status, cognitive ability or social support system     Problem: Safety - Adult  Goal: Free from fall injury  7/25/2024 0954 by Edwin Hoskins RN  Outcome: Progressing  7/25/2024 0432 by Shy Inman RN  Outcome: Progressing     Problem: Pain  Goal: Verbalizes/displays adequate comfort level or baseline comfort level  7/25/2024 0954 by Edwin Hoskins RN  Outcome: Progressing  7/25/2024 0432 by Shy Inman RN  Outcome: Progressing  Flowsheets (Taken 7/24/2024 
  Problem: Chronic Conditions and Co-morbidities  Goal: Patient's chronic conditions and co-morbidity symptoms are monitored and maintained or improved  Outcome: Progressing     Problem: Discharge Planning  Goal: Discharge to home or other facility with appropriate resources  Outcome: Progressing     Problem: Safety - Adult  Goal: Free from fall injury  Outcome: Progressing     Problem: Pain  Goal: Verbalizes/displays adequate comfort level or baseline comfort level  Outcome: Progressing     Problem: Skin/Tissue Integrity  Goal: Absence of new skin breakdown  Description: 1.  Monitor for areas of redness and/or skin breakdown  2.  Assess vascular access sites hourly  3.  Every 4-6 hours minimum:  Change oxygen saturation probe site  4.  Every 4-6 hours:  If on nasal continuous positive airway pressure, respiratory therapy assess nares and determine need for appliance change or resting period.  Outcome: Progressing     Problem: ABCDS Injury Assessment  Goal: Absence of physical injury  Outcome: Progressing     
  Problem: Chronic Conditions and Co-morbidities  Goal: Patient's chronic conditions and co-morbidity symptoms are monitored and maintained or improved  Outcome: Progressing     Problem: Discharge Planning  Goal: Discharge to home or other facility with appropriate resources  Outcome: Progressing     Problem: Safety - Adult  Goal: Free from fall injury  Outcome: Progressing     Problem: Pain  Goal: Verbalizes/displays adequate comfort level or baseline comfort level  Outcome: Progressing     Problem: Skin/Tissue Integrity  Goal: Absence of new skin breakdown  Description: 1.  Monitor for areas of redness and/or skin breakdown  2.  Assess vascular access sites hourly  3.  Every 4-6 hours minimum:  Change oxygen saturation probe site  4.  Every 4-6 hours:  If on nasal continuous positive airway pressure, respiratory therapy assess nares and determine need for appliance change or resting period.  Outcome: Progressing     Problem: ABCDS Injury Assessment  Goal: Absence of physical injury  Outcome: Progressing     
  Problem: Chronic Conditions and Co-morbidities  Goal: Patient's chronic conditions and co-morbidity symptoms are monitored and maintained or improved  Outcome: Progressing     Problem: Discharge Planning  Goal: Discharge to home or other facility with appropriate resources  Outcome: Progressing     Problem: Safety - Adult  Goal: Free from fall injury  Outcome: Progressing     Problem: Pain  Goal: Verbalizes/displays adequate comfort level or baseline comfort level  Outcome: Progressing     Problem: Skin/Tissue Integrity  Goal: Absence of new skin breakdown  Description: 1.  Monitor for areas of redness and/or skin breakdown  2.  Assess vascular access sites hourly  3.  Every 4-6 hours minimum:  Change oxygen saturation probe site  4.  Every 4-6 hours:  If on nasal continuous positive airway pressure, respiratory therapy assess nares and determine need for appliance change or resting period.  Outcome: Progressing     Problem: ABCDS Injury Assessment  Goal: Absence of physical injury  Outcome: Progressing     Problem: Respiratory - Adult  Goal: Achieves optimal ventilation and oxygenation  Outcome: Progressing  Flowsheets (Taken 7/26/2024 0800)  Achieves optimal ventilation and oxygenation: Assess for changes in respiratory status     Problem: Cardiovascular - Adult  Goal: Maintains optimal cardiac output and hemodynamic stability  Outcome: Progressing  Flowsheets (Taken 7/26/2024 0800)  Maintains optimal cardiac output and hemodynamic stability: Monitor blood pressure and heart rate  Goal: Absence of cardiac dysrhythmias or at baseline  Outcome: Progressing  Flowsheets (Taken 7/26/2024 0800)  Absence of cardiac dysrhythmias or at baseline: Monitor cardiac rate and rhythm     Problem: Skin/Tissue Integrity - Adult  Goal: Skin integrity remains intact  Outcome: Progressing     Problem: Metabolic/Fluid and Electrolytes - Adult  Goal: Hemodynamic stability and optimal renal function maintained  Outcome: 
  Problem: Chronic Conditions and Co-morbidities  Goal: Patient's chronic conditions and co-morbidity symptoms are monitored and maintained or improved  Outcome: Progressing  Flowsheets   Care Plan - Patient's Chronic Conditions and Co-Morbidity Symptoms are Monitored and Maintained or Improved:   Collaborate with multidisciplinary team to address chronic and comorbid conditions and prevent exacerbation or deterioration   Monitor and assess patient's chronic conditions and comorbid symptoms for stability, deterioration, or improvement   Update acute care plan with appropriate goals if chronic or comorbid symptoms are exacerbated and prevent overall improvement and discharge    Problem: Discharge Planning  Goal: Discharge to home or other facility with appropriate resources  Outcome: Progressing  Flowsheets   Discharge to home or other facility with appropriate resources:   Identify barriers to discharge with patient and caregiver   Arrange for needed discharge resources and transportation as appropriate   Identify discharge learning needs (meds, wound care, etc)      Problem: Safety - Adult  Goal: Free from fall injury  Outcome: Progressing  Flowsheets   Free From Fall Injury: Instruct family/caregiver on patient safety      Problem: Pain  Goal: Verbalizes/displays adequate comfort level or baseline comfort level  Outcome: Progressing  Flowsheets   Verbalizes/displays adequate comfort level or baseline comfort level:   Encourage patient to monitor pain and request assistance   Assess pain using appropriate pain scale   Administer analgesics based on type and severity of pain and evaluate response   Implement non-pharmacological measures as appropriate and evaluate response   Consider cultural and social influences on pain and pain management   Notify Licensed Independent Practitioner if interventions unsuccessful or patient reports new pain      Problem: Skin/Tissue Integrity  Goal: Absence of new skin 
  Problem: Chronic Conditions and Co-morbidities  Goal: Patient's chronic conditions and co-morbidity symptoms are monitored and maintained or improved  Outcome: Progressing  Flowsheets (Taken 7/19/2024 2000)  Care Plan - Patient's Chronic Conditions and Co-Morbidity Symptoms are Monitored and Maintained or Improved:   Monitor and assess patient's chronic conditions and comorbid symptoms for stability, deterioration, or improvement   Collaborate with multidisciplinary team to address chronic and comorbid conditions and prevent exacerbation or deterioration   Update acute care plan with appropriate goals if chronic or comorbid symptoms are exacerbated and prevent overall improvement and discharge     Problem: Discharge Planning  Goal: Discharge to home or other facility with appropriate resources  Outcome: Progressing  Flowsheets (Taken 7/19/2024 2000)  Discharge to home or other facility with appropriate resources:   Identify barriers to discharge with patient and caregiver   Arrange for needed discharge resources and transportation as appropriate   Identify discharge learning needs (meds, wound care, etc)   Arrange for interpreters to assist at discharge as needed   Refer to discharge planning if patient needs post-hospital services based on physician order or complex needs related to functional status, cognitive ability or social support system     Problem: Safety - Adult  Goal: Free from fall injury  Outcome: Progressing  Flowsheets (Taken 7/19/2024 2000)  Free From Fall Injury:   Based on caregiver fall risk screen, instruct family/caregiver to ask for assistance with transferring infant if caregiver noted to have fall risk factors   Instruct family/caregiver on patient safety     Problem: Pain  Goal: Verbalizes/displays adequate comfort level or baseline comfort level  Outcome: Progressing  Flowsheets (Taken 7/19/2024 2000)  Verbalizes/displays adequate comfort level or baseline comfort level:   Encourage 
  Problem: Chronic Conditions and Co-morbidities  Goal: Patient's chronic conditions and co-morbidity symptoms are monitored and maintained or improved  Outcome: Progressing  Flowsheets (Taken 7/20/2024 2000)  Care Plan - Patient's Chronic Conditions and Co-Morbidity Symptoms are Monitored and Maintained or Improved:   Monitor and assess patient's chronic conditions and comorbid symptoms for stability, deterioration, or improvement   Collaborate with multidisciplinary team to address chronic and comorbid conditions and prevent exacerbation or deterioration   Update acute care plan with appropriate goals if chronic or comorbid symptoms are exacerbated and prevent overall improvement and discharge     Problem: Discharge Planning  Goal: Discharge to home or other facility with appropriate resources  Outcome: Progressing  Flowsheets (Taken 7/20/2024 2000)  Discharge to home or other facility with appropriate resources:   Identify barriers to discharge with patient and caregiver   Arrange for needed discharge resources and transportation as appropriate   Identify discharge learning needs (meds, wound care, etc)   Arrange for interpreters to assist at discharge as needed   Refer to discharge planning if patient needs post-hospital services based on physician order or complex needs related to functional status, cognitive ability or social support system     Problem: Safety - Adult  Goal: Free from fall injury  Outcome: Progressing  Flowsheets (Taken 7/20/2024 2000)  Free From Fall Injury:   Based on caregiver fall risk screen, instruct family/caregiver to ask for assistance with transferring infant if caregiver noted to have fall risk factors   Instruct family/caregiver on patient safety     Problem: Pain  Goal: Verbalizes/displays adequate comfort level or baseline comfort level  Outcome: Progressing     Problem: Skin/Tissue Integrity  Goal: Absence of new skin breakdown  Description: 1.  Monitor for areas of redness 
  Problem: Chronic Conditions and Co-morbidities  Goal: Patient's chronic conditions and co-morbidity symptoms are monitored and maintained or improved  Outcome: Progressing  Flowsheets (Taken 7/24/2024 2000)  Care Plan - Patient's Chronic Conditions and Co-Morbidity Symptoms are Monitored and Maintained or Improved:   Monitor and assess patient's chronic conditions and comorbid symptoms for stability, deterioration, or improvement   Collaborate with multidisciplinary team to address chronic and comorbid conditions and prevent exacerbation or deterioration   Update acute care plan with appropriate goals if chronic or comorbid symptoms are exacerbated and prevent overall improvement and discharge     Problem: Discharge Planning  Goal: Discharge to home or other facility with appropriate resources  Outcome: Progressing  Flowsheets (Taken 7/24/2024 2000)  Discharge to home or other facility with appropriate resources:   Identify barriers to discharge with patient and caregiver   Arrange for needed discharge resources and transportation as appropriate   Identify discharge learning needs (meds, wound care, etc)   Refer to discharge planning if patient needs post-hospital services based on physician order or complex needs related to functional status, cognitive ability or social support system     Problem: Safety - Adult  Goal: Free from fall injury  Outcome: Progressing     Problem: Pain  Goal: Verbalizes/displays adequate comfort level or baseline comfort level  Outcome: Progressing  Flowsheets (Taken 7/24/2024 2000)  Verbalizes/displays adequate comfort level or baseline comfort level:   Encourage patient to monitor pain and request assistance   Assess pain using appropriate pain scale   Administer analgesics based on type and severity of pain and evaluate response   Implement non-pharmacological measures as appropriate and evaluate response   Notify Licensed Independent Practitioner if interventions unsuccessful or 
  Problem: Chronic Conditions and Co-morbidities  Goal: Patient's chronic conditions and co-morbidity symptoms are monitored and maintained or improved  Outcome: Progressing  Flowsheets (Taken 7/25/2024 2000)  Care Plan - Patient's Chronic Conditions and Co-Morbidity Symptoms are Monitored and Maintained or Improved:   Monitor and assess patient's chronic conditions and comorbid symptoms for stability, deterioration, or improvement   Collaborate with multidisciplinary team to address chronic and comorbid conditions and prevent exacerbation or deterioration   Update acute care plan with appropriate goals if chronic or comorbid symptoms are exacerbated and prevent overall improvement and discharge     Problem: Discharge Planning  Goal: Discharge to home or other facility with appropriate resources  Outcome: Progressing  Flowsheets (Taken 7/25/2024 2000)  Discharge to home or other facility with appropriate resources:   Identify barriers to discharge with patient and caregiver   Arrange for needed discharge resources and transportation as appropriate   Identify discharge learning needs (meds, wound care, etc)   Refer to discharge planning if patient needs post-hospital services based on physician order or complex needs related to functional status, cognitive ability or social support system     Problem: Safety - Adult  Goal: Free from fall injury  Outcome: Progressing     Problem: Pain  Goal: Verbalizes/displays adequate comfort level or baseline comfort level  Outcome: Progressing     Problem: Skin/Tissue Integrity  Goal: Absence of new skin breakdown  Description: 1.  Monitor for areas of redness and/or skin breakdown  2.  Assess vascular access sites hourly  3.  Every 4-6 hours minimum:  Change oxygen saturation probe site  4.  Every 4-6 hours:  If on nasal continuous positive airway pressure, respiratory therapy assess nares and determine need for appliance change or resting period.  Outcome: Progressing   
mentation and behavior   Oxygen supplementation based on oxygen saturation or arterial blood gases   Position to facilitate oxygenation and minimize respiratory effort   Initiate smoking cessation protocol as indicated   Assess the need for suctioning and aspirate as needed     Problem: Cardiovascular - Adult  Goal: Maintains optimal cardiac output and hemodynamic stability  Outcome: Progressing  Flowsheets (Taken 7/23/2024 2000)  Maintains optimal cardiac output and hemodynamic stability:   Monitor blood pressure and heart rate   Monitor urine output and notify Licensed Independent Practitioner for values outside of normal range   Assess for signs of decreased cardiac output   Administer fluid and/or volume expanders as ordered   Administer vasoactive medications as ordered  Goal: Absence of cardiac dysrhythmias or at baseline  Outcome: Progressing  Flowsheets (Taken 7/23/2024 2000)  Absence of cardiac dysrhythmias or at baseline:   Monitor cardiac rate and rhythm   Assess for signs of decreased cardiac output   Administer antiarrhythmia medication and electrolyte replacement as ordered     Problem: Skin/Tissue Integrity - Adult  Goal: Skin integrity remains intact  Outcome: Progressing  Flowsheets (Taken 7/23/2024 2000)  Skin Integrity Remains Intact:   Assess vascular access sites hourly   Monitor for areas of redness and/or skin breakdown     Problem: Metabolic/Fluid and Electrolytes - Adult  Goal: Hemodynamic stability and optimal renal function maintained  Outcome: Progressing  Goal: Glucose maintained within prescribed range  Outcome: Progressing

## 2024-07-29 LAB — GLUCOSE BLD-MCNC: 278 MG/DL (ref 65–105)

## 2024-07-30 ENCOUNTER — OFFICE VISIT (OUTPATIENT)
Dept: ORTHOPEDIC SURGERY | Age: 64
End: 2024-07-30
Payer: COMMERCIAL

## 2024-07-30 VITALS — BODY MASS INDEX: 41.65 KG/M2 | WEIGHT: 250 LBS | HEIGHT: 65 IN

## 2024-07-30 DIAGNOSIS — S52.591D OTHER CLOSED FRACTURE OF DISTAL END OF RIGHT RADIUS WITH ROUTINE HEALING, SUBSEQUENT ENCOUNTER: Primary | ICD-10-CM

## 2024-07-30 PROCEDURE — G8400 PT W/DXA NO RESULTS DOC: HCPCS

## 2024-07-30 PROCEDURE — 3017F COLORECTAL CA SCREEN DOC REV: CPT

## 2024-07-30 PROCEDURE — 1123F ACP DISCUSS/DSCN MKR DOCD: CPT

## 2024-07-30 PROCEDURE — 1111F DSCHRG MED/CURRENT MED MERGE: CPT

## 2024-07-30 PROCEDURE — 1036F TOBACCO NON-USER: CPT

## 2024-07-30 PROCEDURE — 1090F PRES/ABSN URINE INCON ASSESS: CPT

## 2024-07-30 PROCEDURE — G8417 CALC BMI ABV UP PARAM F/U: HCPCS

## 2024-07-30 PROCEDURE — G8427 DOCREV CUR MEDS BY ELIG CLIN: HCPCS

## 2024-07-30 PROCEDURE — 99212 OFFICE O/P EST SF 10 MIN: CPT

## 2024-07-30 NOTE — PROGRESS NOTES
to being immobilized for a long period of time.  At this time would like to transition her into a removable wrist brace that she should wear while she is active.  She should begin working on gentle range of motion with flexion, extension, radial and ulnar deviations.  She should also work on  strength and gradually introduce strengthening of the hand and wrist.  She may continue with Tylenol for pain control.  I would like to follow-up with the patient in 6 weeks for new radiographs as well as reevaluation of her at that time.  Patient is amenable to plan at this time and she was encouraged to contact the office any question concerns she may have prior to that follow-up appointment.

## 2024-07-31 ENCOUNTER — TELEPHONE (OUTPATIENT)
Dept: ORTHOPEDIC SURGERY | Age: 64
End: 2024-07-31

## 2024-07-31 NOTE — TELEPHONE ENCOUNTER
Received call from Wendy (nurse @ [facility]) requesting to know what pts restrictions are for wearing her brace. How long is she supposed to have it on? How long should it be taken off?     Please advise.    Call back [phone]

## 2024-08-02 ENCOUNTER — HOSPITAL ENCOUNTER (OUTPATIENT)
Age: 64
Setting detail: SPECIMEN
Discharge: HOME | End: 2024-08-02

## 2024-08-06 ENCOUNTER — HOSPITAL ENCOUNTER (OUTPATIENT)
Age: 64
Setting detail: SPECIMEN
Discharge: HOME | End: 2024-08-06

## 2024-08-06 LAB
ANION GAP SERPL CALCULATED.3IONS-SCNC: 12 MMOL/L (ref 9–17)
BUN SERPL-MCNC: 19 MG/DL (ref 8–23)
BUN/CREAT SERPL: 5 (ref 9–20)
CALCIUM SERPL-MCNC: 8.9 MG/DL (ref 8.6–10.4)
CHLORIDE SERPL-SCNC: 91 MMOL/L (ref 98–107)
CO2 SERPL-SCNC: 32 MMOL/L (ref 20–31)
CREAT SERPL-MCNC: 4.1 MG/DL (ref 0.5–0.9)
GFR, ESTIMATED: 11 ML/MIN/1.73M2
GLUCOSE SERPL-MCNC: 102 MG/DL (ref 70–99)
POTASSIUM SERPL-SCNC: 3.9 MMOL/L (ref 3.7–5.3)
SODIUM SERPL-SCNC: 135 MMOL/L (ref 135–144)

## 2024-08-06 PROCEDURE — 36415 COLL VENOUS BLD VENIPUNCTURE: CPT

## 2024-08-06 PROCEDURE — 80048 BASIC METABOLIC PNL TOTAL CA: CPT

## 2024-08-06 PROCEDURE — P9603 ONE-WAY ALLOW PRORATED MILES: HCPCS

## 2024-08-14 ENCOUNTER — HOSPITAL ENCOUNTER (INPATIENT)
Age: 64
LOS: 6 days | Discharge: ANOTHER HOSPITAL | DRG: 280 | End: 2024-08-20
Attending: EMERGENCY MEDICINE | Admitting: FAMILY MEDICINE
Payer: COMMERCIAL

## 2024-08-14 ENCOUNTER — APPOINTMENT (OUTPATIENT)
Dept: GENERAL RADIOLOGY | Age: 64
DRG: 280 | End: 2024-08-14
Attending: EMERGENCY MEDICINE
Payer: COMMERCIAL

## 2024-08-14 DIAGNOSIS — Z99.2 ESRD (END STAGE RENAL DISEASE) ON DIALYSIS (HCC): ICD-10-CM

## 2024-08-14 DIAGNOSIS — I24.9 ACUTE CORONARY SYNDROME (HCC): ICD-10-CM

## 2024-08-14 DIAGNOSIS — E87.70 HYPERVOLEMIA, UNSPECIFIED HYPERVOLEMIA TYPE: Primary | ICD-10-CM

## 2024-08-14 DIAGNOSIS — G89.29 ACUTE EXACERBATION OF CHRONIC LOW BACK PAIN: ICD-10-CM

## 2024-08-14 DIAGNOSIS — M54.50 ACUTE EXACERBATION OF CHRONIC LOW BACK PAIN: ICD-10-CM

## 2024-08-14 DIAGNOSIS — N18.6 ESRD (END STAGE RENAL DISEASE) ON DIALYSIS (HCC): ICD-10-CM

## 2024-08-14 LAB
ALBUMIN SERPL-MCNC: 3.8 G/DL (ref 3.5–5.2)
ALP SERPL-CCNC: 187 U/L (ref 35–104)
ALT SERPL-CCNC: 24 U/L (ref 5–33)
ANION GAP SERPL CALCULATED.3IONS-SCNC: 18 MMOL/L (ref 9–17)
AST SERPL-CCNC: 42 U/L
BASOPHILS # BLD: 0.09 K/UL (ref 0–0.2)
BASOPHILS NFR BLD: 1 % (ref 0–2)
BILIRUB SERPL-MCNC: 0.8 MG/DL (ref 0.3–1.2)
BUN SERPL-MCNC: 37 MG/DL (ref 8–23)
CALCIUM SERPL-MCNC: 9.5 MG/DL (ref 8.6–10.4)
CHLORIDE SERPL-SCNC: 95 MMOL/L (ref 98–107)
CO2 SERPL-SCNC: 25 MMOL/L (ref 20–31)
CREAT SERPL-MCNC: 5.8 MG/DL (ref 0.5–0.9)
EOSINOPHIL # BLD: 0 K/UL (ref 0–0.4)
EOSINOPHILS RELATIVE PERCENT: 0 % (ref 0–4)
ERYTHROCYTE [DISTWIDTH] IN BLOOD BY AUTOMATED COUNT: 20 % (ref 11.5–14.9)
GFR, ESTIMATED: 8 ML/MIN/1.73M2
GLUCOSE BLD-MCNC: 98 MG/DL (ref 65–105)
GLUCOSE SERPL-MCNC: 179 MG/DL (ref 70–99)
HCT VFR BLD AUTO: 29.5 % (ref 36–46)
HGB BLD-MCNC: 9.5 G/DL (ref 12–16)
INR PPP: 1.3
LYMPHOCYTES NFR BLD: 0.65 K/UL (ref 1–4.8)
LYMPHOCYTES RELATIVE PERCENT: 7 % (ref 24–44)
MAGNESIUM SERPL-MCNC: 2.8 MG/DL (ref 1.6–2.6)
MCH RBC QN AUTO: 38.1 PG (ref 26–34)
MCHC RBC AUTO-ENTMCNC: 32.2 G/DL (ref 31–37)
MCV RBC AUTO: 118.3 FL (ref 80–100)
MONOCYTES NFR BLD: 0.93 K/UL (ref 0.1–1.3)
MONOCYTES NFR BLD: 10 % (ref 1–7)
MORPHOLOGY: ABNORMAL
MORPHOLOGY: ABNORMAL
NEUTROPHILS NFR BLD: 82 % (ref 36–66)
NEUTS SEG NFR BLD: 7.63 K/UL (ref 1.3–9.1)
PLATELET # BLD AUTO: 172 K/UL (ref 150–450)
PMV BLD AUTO: 9 FL (ref 6–12)
POTASSIUM SERPL-SCNC: 4.5 MMOL/L (ref 3.7–5.3)
PROT SERPL-MCNC: 7.6 G/DL (ref 6.4–8.3)
PROTHROMBIN TIME: 16.3 SEC (ref 11.8–14.6)
RBC # BLD AUTO: 2.5 M/UL (ref 4–5.2)
RETICS # AUTO: 0.11 M/UL (ref 0.02–0.1)
RETICS/RBC NFR AUTO: 4.5 % (ref 0.5–2)
SODIUM SERPL-SCNC: 138 MMOL/L (ref 135–144)
TROPONIN I SERPL HS-MCNC: 209 NG/L (ref 0–14)
WBC OTHER # BLD: 9.3 K/UL (ref 3.5–11)

## 2024-08-14 PROCEDURE — 1200000000 HC SEMI PRIVATE

## 2024-08-14 PROCEDURE — 85610 PROTHROMBIN TIME: CPT

## 2024-08-14 PROCEDURE — 93005 ELECTROCARDIOGRAM TRACING: CPT | Performed by: EMERGENCY MEDICINE

## 2024-08-14 PROCEDURE — 6370000000 HC RX 637 (ALT 250 FOR IP): Performed by: EMERGENCY MEDICINE

## 2024-08-14 PROCEDURE — 5A1D70Z PERFORMANCE OF URINARY FILTRATION, INTERMITTENT, LESS THAN 6 HOURS PER DAY: ICD-10-PCS | Performed by: INTERNAL MEDICINE

## 2024-08-14 PROCEDURE — 6360000002 HC RX W HCPCS: Performed by: FAMILY MEDICINE

## 2024-08-14 PROCEDURE — 6370000000 HC RX 637 (ALT 250 FOR IP): Performed by: FAMILY MEDICINE

## 2024-08-14 PROCEDURE — 2580000003 HC RX 258: Performed by: FAMILY MEDICINE

## 2024-08-14 PROCEDURE — 85045 AUTOMATED RETICULOCYTE COUNT: CPT

## 2024-08-14 PROCEDURE — 90935 HEMODIALYSIS ONE EVALUATION: CPT

## 2024-08-14 PROCEDURE — 71045 X-RAY EXAM CHEST 1 VIEW: CPT

## 2024-08-14 PROCEDURE — 6360000002 HC RX W HCPCS: Performed by: INTERNAL MEDICINE

## 2024-08-14 PROCEDURE — 82947 ASSAY GLUCOSE BLOOD QUANT: CPT

## 2024-08-14 PROCEDURE — P9047 ALBUMIN (HUMAN), 25%, 50ML: HCPCS | Performed by: INTERNAL MEDICINE

## 2024-08-14 PROCEDURE — 6370000000 HC RX 637 (ALT 250 FOR IP): Performed by: INTERNAL MEDICINE

## 2024-08-14 PROCEDURE — 84484 ASSAY OF TROPONIN QUANT: CPT

## 2024-08-14 PROCEDURE — 80053 COMPREHEN METABOLIC PANEL: CPT

## 2024-08-14 PROCEDURE — 36415 COLL VENOUS BLD VENIPUNCTURE: CPT

## 2024-08-14 PROCEDURE — 99285 EMERGENCY DEPT VISIT HI MDM: CPT

## 2024-08-14 PROCEDURE — 85025 COMPLETE CBC W/AUTO DIFF WBC: CPT

## 2024-08-14 PROCEDURE — 83036 HEMOGLOBIN GLYCOSYLATED A1C: CPT

## 2024-08-14 PROCEDURE — 83735 ASSAY OF MAGNESIUM: CPT

## 2024-08-14 RX ORDER — NITROGLYCERIN 0.4 MG/1
0.4 TABLET SUBLINGUAL EVERY 5 MIN PRN
Status: DISCONTINUED | OUTPATIENT
Start: 2024-08-14 | End: 2024-08-20 | Stop reason: HOSPADM

## 2024-08-14 RX ORDER — POLYETHYLENE GLYCOL 3350 17 G/17G
17 POWDER, FOR SOLUTION ORAL DAILY PRN
Status: DISCONTINUED | OUTPATIENT
Start: 2024-08-14 | End: 2024-08-20 | Stop reason: HOSPADM

## 2024-08-14 RX ORDER — ALLOPURINOL 100 MG/1
100 TABLET ORAL DAILY
Status: DISCONTINUED | OUTPATIENT
Start: 2024-08-15 | End: 2024-08-18

## 2024-08-14 RX ORDER — FERROUS SULFATE 325(65) MG
325 TABLET ORAL DAILY
Status: DISCONTINUED | OUTPATIENT
Start: 2024-08-15 | End: 2024-08-20 | Stop reason: HOSPADM

## 2024-08-14 RX ORDER — INSULIN LISPRO 100 [IU]/ML
0-4 INJECTION, SOLUTION INTRAVENOUS; SUBCUTANEOUS NIGHTLY
Status: DISCONTINUED | OUTPATIENT
Start: 2024-08-14 | End: 2024-08-20 | Stop reason: HOSPADM

## 2024-08-14 RX ORDER — LANOLIN ALCOHOL/MO/W.PET/CERES
9 CREAM (GRAM) TOPICAL NIGHTLY PRN
Status: DISCONTINUED | OUTPATIENT
Start: 2024-08-14 | End: 2024-08-20 | Stop reason: HOSPADM

## 2024-08-14 RX ORDER — MIDODRINE HYDROCHLORIDE 5 MG/1
5 TABLET ORAL ONCE
Status: COMPLETED | OUTPATIENT
Start: 2024-08-14 | End: 2024-08-14

## 2024-08-14 RX ORDER — INSULIN GLARGINE 100 [IU]/ML
42 INJECTION, SOLUTION SUBCUTANEOUS NIGHTLY
Status: DISCONTINUED | OUTPATIENT
Start: 2024-08-14 | End: 2024-08-20 | Stop reason: HOSPADM

## 2024-08-14 RX ORDER — CYCLOBENZAPRINE HCL 10 MG
5 TABLET ORAL 3 TIMES DAILY PRN
Status: DISCONTINUED | OUTPATIENT
Start: 2024-08-14 | End: 2024-08-20 | Stop reason: HOSPADM

## 2024-08-14 RX ORDER — LIDOCAINE 50 MG/G
1 PATCH TOPICAL DAILY PRN
Status: ON HOLD | COMMUNITY

## 2024-08-14 RX ORDER — ALBUMIN (HUMAN) 12.5 G/50ML
25 SOLUTION INTRAVENOUS PRN
Status: COMPLETED | OUTPATIENT
Start: 2024-08-14 | End: 2024-08-14

## 2024-08-14 RX ORDER — BUSPIRONE HYDROCHLORIDE 10 MG/1
10 TABLET ORAL 3 TIMES DAILY
Status: DISCONTINUED | OUTPATIENT
Start: 2024-08-14 | End: 2024-08-20 | Stop reason: HOSPADM

## 2024-08-14 RX ORDER — BUMETANIDE 1 MG/1
3 TABLET ORAL
Status: DISCONTINUED | OUTPATIENT
Start: 2024-08-15 | End: 2024-08-20 | Stop reason: HOSPADM

## 2024-08-14 RX ORDER — DEXTROSE MONOHYDRATE 100 MG/ML
INJECTION, SOLUTION INTRAVENOUS CONTINUOUS PRN
Status: DISCONTINUED | OUTPATIENT
Start: 2024-08-14 | End: 2024-08-20 | Stop reason: HOSPADM

## 2024-08-14 RX ORDER — ONDANSETRON 2 MG/ML
4 INJECTION INTRAMUSCULAR; INTRAVENOUS EVERY 6 HOURS PRN
Status: DISCONTINUED | OUTPATIENT
Start: 2024-08-14 | End: 2024-08-20 | Stop reason: HOSPADM

## 2024-08-14 RX ORDER — ACETAMINOPHEN 650 MG/1
650 SUPPOSITORY RECTAL EVERY 6 HOURS PRN
Status: DISCONTINUED | OUTPATIENT
Start: 2024-08-14 | End: 2024-08-19

## 2024-08-14 RX ORDER — CARVEDILOL 3.12 MG/1
3.12 TABLET ORAL 2 TIMES DAILY
Status: DISCONTINUED | OUTPATIENT
Start: 2024-08-14 | End: 2024-08-20 | Stop reason: HOSPADM

## 2024-08-14 RX ORDER — HYDROCODONE BITARTRATE AND ACETAMINOPHEN 5; 325 MG/1; MG/1
1 TABLET ORAL EVERY 4 HOURS PRN
Status: ON HOLD | COMMUNITY
End: 2024-08-16

## 2024-08-14 RX ORDER — MIDODRINE HYDROCHLORIDE 5 MG/1
5 TABLET ORAL PRN
Status: COMPLETED | OUTPATIENT
Start: 2024-08-14 | End: 2024-08-14

## 2024-08-14 RX ORDER — MIDODRINE HYDROCHLORIDE 5 MG/1
5 TABLET ORAL DAILY PRN
Status: DISCONTINUED | OUTPATIENT
Start: 2024-08-14 | End: 2024-08-20 | Stop reason: HOSPADM

## 2024-08-14 RX ORDER — SODIUM CHLORIDE 0.9 % (FLUSH) 0.9 %
5-40 SYRINGE (ML) INJECTION PRN
Status: DISCONTINUED | OUTPATIENT
Start: 2024-08-14 | End: 2024-08-20 | Stop reason: HOSPADM

## 2024-08-14 RX ORDER — FENTANYL CITRATE 0.05 MG/ML
25 INJECTION, SOLUTION INTRAMUSCULAR; INTRAVENOUS
Status: DISCONTINUED | OUTPATIENT
Start: 2024-08-14 | End: 2024-08-16

## 2024-08-14 RX ORDER — VENLAFAXINE HYDROCHLORIDE 75 MG/1
75 CAPSULE, EXTENDED RELEASE ORAL
Status: DISCONTINUED | OUTPATIENT
Start: 2024-08-15 | End: 2024-08-20 | Stop reason: HOSPADM

## 2024-08-14 RX ORDER — ACETAMINOPHEN 500 MG
1000 TABLET ORAL ONCE
Status: COMPLETED | OUTPATIENT
Start: 2024-08-14 | End: 2024-08-14

## 2024-08-14 RX ORDER — ONDANSETRON 4 MG/1
4 TABLET, ORALLY DISINTEGRATING ORAL EVERY 8 HOURS PRN
Status: DISCONTINUED | OUTPATIENT
Start: 2024-08-14 | End: 2024-08-20 | Stop reason: HOSPADM

## 2024-08-14 RX ORDER — HEPARIN SODIUM 5000 [USP'U]/ML
5000 INJECTION, SOLUTION INTRAVENOUS; SUBCUTANEOUS EVERY 8 HOURS SCHEDULED
Status: DISCONTINUED | OUTPATIENT
Start: 2024-08-14 | End: 2024-08-17

## 2024-08-14 RX ORDER — SODIUM CHLORIDE 0.9 % (FLUSH) 0.9 %
5-40 SYRINGE (ML) INJECTION EVERY 12 HOURS SCHEDULED
Status: DISCONTINUED | OUTPATIENT
Start: 2024-08-14 | End: 2024-08-20 | Stop reason: HOSPADM

## 2024-08-14 RX ORDER — PRASUGREL 10 MG/1
10 TABLET, FILM COATED ORAL DAILY
Status: DISCONTINUED | OUTPATIENT
Start: 2024-08-15 | End: 2024-08-20 | Stop reason: HOSPADM

## 2024-08-14 RX ORDER — SODIUM BICARBONATE 650 MG/1
1300 TABLET ORAL 2 TIMES DAILY
Status: DISCONTINUED | OUTPATIENT
Start: 2024-08-14 | End: 2024-08-20 | Stop reason: HOSPADM

## 2024-08-14 RX ORDER — ACETAMINOPHEN 325 MG/1
650 TABLET ORAL EVERY 6 HOURS PRN
Status: DISCONTINUED | OUTPATIENT
Start: 2024-08-14 | End: 2024-08-19

## 2024-08-14 RX ORDER — LANOLIN ALCOHOL/MO/W.PET/CERES
400 CREAM (GRAM) TOPICAL DAILY
Status: DISCONTINUED | OUTPATIENT
Start: 2024-08-14 | End: 2024-08-20 | Stop reason: HOSPADM

## 2024-08-14 RX ORDER — ASPIRIN 81 MG/1
81 TABLET, CHEWABLE ORAL DAILY
Status: DISCONTINUED | OUTPATIENT
Start: 2024-08-15 | End: 2024-08-20 | Stop reason: HOSPADM

## 2024-08-14 RX ORDER — SEVELAMER CARBONATE 800 MG/1
1600 TABLET, FILM COATED ORAL
Status: DISCONTINUED | OUTPATIENT
Start: 2024-08-15 | End: 2024-08-20 | Stop reason: HOSPADM

## 2024-08-14 RX ORDER — SODIUM CHLORIDE 9 MG/ML
INJECTION, SOLUTION INTRAVENOUS PRN
Status: DISCONTINUED | OUTPATIENT
Start: 2024-08-14 | End: 2024-08-20 | Stop reason: HOSPADM

## 2024-08-14 RX ORDER — INSULIN LISPRO 100 [IU]/ML
20 INJECTION, SOLUTION INTRAVENOUS; SUBCUTANEOUS
Status: DISCONTINUED | OUTPATIENT
Start: 2024-08-15 | End: 2024-08-20 | Stop reason: HOSPADM

## 2024-08-14 RX ORDER — LIDOCAINE/PRILOCAINE 2.5 %-2.5%
CREAM (GRAM) TOPICAL PRN
Status: DISCONTINUED | OUTPATIENT
Start: 2024-08-14 | End: 2024-08-20 | Stop reason: HOSPADM

## 2024-08-14 RX ORDER — INSULIN LISPRO 100 [IU]/ML
0-4 INJECTION, SOLUTION INTRAVENOUS; SUBCUTANEOUS
Status: DISCONTINUED | OUTPATIENT
Start: 2024-08-15 | End: 2024-08-20 | Stop reason: HOSPADM

## 2024-08-14 RX ORDER — HYDROCODONE BITARTRATE AND ACETAMINOPHEN 5; 325 MG/1; MG/1
1 TABLET ORAL ONCE
Status: DISCONTINUED | OUTPATIENT
Start: 2024-08-14 | End: 2024-08-14

## 2024-08-14 RX ORDER — ATORVASTATIN CALCIUM 80 MG/1
80 TABLET, FILM COATED ORAL NIGHTLY
Status: DISCONTINUED | OUTPATIENT
Start: 2024-08-14 | End: 2024-08-20 | Stop reason: HOSPADM

## 2024-08-14 RX ORDER — POTASSIUM CHLORIDE 750 MG/1
10 CAPSULE, EXTENDED RELEASE ORAL 2 TIMES DAILY
Status: DISCONTINUED | OUTPATIENT
Start: 2024-08-14 | End: 2024-08-20 | Stop reason: HOSPADM

## 2024-08-14 RX ORDER — DOCUSATE SODIUM 100 MG/1
100 CAPSULE, LIQUID FILLED ORAL 2 TIMES DAILY
Status: DISCONTINUED | OUTPATIENT
Start: 2024-08-14 | End: 2024-08-20 | Stop reason: HOSPADM

## 2024-08-14 RX ORDER — INSULIN GLARGINE 100 [IU]/ML
72 INJECTION, SOLUTION SUBCUTANEOUS EVERY MORNING
Status: DISCONTINUED | OUTPATIENT
Start: 2024-08-15 | End: 2024-08-20 | Stop reason: HOSPADM

## 2024-08-14 RX ORDER — POLYVINYL ALCOHOL 14 MG/ML
1 SOLUTION/ DROPS OPHTHALMIC PRN
Status: DISCONTINUED | OUTPATIENT
Start: 2024-08-14 | End: 2024-08-20 | Stop reason: HOSPADM

## 2024-08-14 RX ORDER — CALCIUM POLYCARBOPHIL 625 MG 625 MG/1
1250 TABLET ORAL 2 TIMES DAILY
Status: DISCONTINUED | OUTPATIENT
Start: 2024-08-14 | End: 2024-08-20 | Stop reason: HOSPADM

## 2024-08-14 RX ADMIN — SODIUM BICARBONATE 1300 MG: 650 TABLET ORAL at 22:37

## 2024-08-14 RX ADMIN — CYCLOBENZAPRINE 5 MG: 10 TABLET, FILM COATED ORAL at 22:38

## 2024-08-14 RX ADMIN — ALBUMIN (HUMAN) 25 G: 0.25 INJECTION, SOLUTION INTRAVENOUS at 18:11

## 2024-08-14 RX ADMIN — Medication 9 MG: at 22:44

## 2024-08-14 RX ADMIN — MIDODRINE HYDROCHLORIDE 5 MG: 5 TABLET ORAL at 13:39

## 2024-08-14 RX ADMIN — MIDODRINE HYDROCHLORIDE 5 MG: 5 TABLET ORAL at 11:15

## 2024-08-14 RX ADMIN — Medication 400 MG: at 22:37

## 2024-08-14 RX ADMIN — BUSPIRONE HYDROCHLORIDE 10 MG: 10 TABLET ORAL at 22:37

## 2024-08-14 RX ADMIN — HEPARIN SODIUM 5000 UNITS: 5000 INJECTION INTRAVENOUS; SUBCUTANEOUS at 22:37

## 2024-08-14 RX ADMIN — DOCUSATE SODIUM 100 MG: 100 CAPSULE, LIQUID FILLED ORAL at 22:37

## 2024-08-14 RX ADMIN — MIDODRINE HYDROCHLORIDE 5 MG: 5 TABLET ORAL at 18:10

## 2024-08-14 RX ADMIN — POTASSIUM CHLORIDE 10 MEQ: 750 CAPSULE, EXTENDED RELEASE ORAL at 22:37

## 2024-08-14 RX ADMIN — ATORVASTATIN CALCIUM 80 MG: 80 TABLET, FILM COATED ORAL at 22:38

## 2024-08-14 RX ADMIN — SODIUM CHLORIDE, PRESERVATIVE FREE 10 ML: 5 INJECTION INTRAVENOUS at 22:38

## 2024-08-14 RX ADMIN — MIDODRINE HYDROCHLORIDE 5 MG: 5 TABLET ORAL at 19:44

## 2024-08-14 RX ADMIN — INSULIN GLARGINE 42 UNITS: 100 INJECTION, SOLUTION SUBCUTANEOUS at 22:37

## 2024-08-14 RX ADMIN — CALCIUM POLYCARBOPHIL 1250 MG: 625 TABLET ORAL at 23:00

## 2024-08-14 RX ADMIN — ACETAMINOPHEN 1000 MG: 500 TABLET ORAL at 11:17

## 2024-08-14 RX ADMIN — ALBUMIN (HUMAN) 25 G: 0.25 INJECTION, SOLUTION INTRAVENOUS at 19:44

## 2024-08-14 ASSESSMENT — PAIN DESCRIPTION - DESCRIPTORS
DESCRIPTORS: SPASM
DESCRIPTORS: SPASM

## 2024-08-14 ASSESSMENT — PAIN SCALES - GENERAL
PAINLEVEL_OUTOF10: 8
PAINLEVEL_OUTOF10: 8

## 2024-08-14 ASSESSMENT — PAIN SCALES - WONG BAKER: WONGBAKER_NUMERICALRESPONSE: HURTS A LITTLE BIT

## 2024-08-14 ASSESSMENT — PAIN - FUNCTIONAL ASSESSMENT: PAIN_FUNCTIONAL_ASSESSMENT: ACTIVITIES ARE NOT PREVENTED

## 2024-08-14 ASSESSMENT — PAIN DESCRIPTION - LOCATION
LOCATION: BACK
LOCATION: BACK

## 2024-08-14 NOTE — ED TRIAGE NOTES
Mode of arrival (squad #, walk in, police, etc) : squad        Chief complaint(s): Back spasms        Arrival Note (brief scenario, treatment PTA, etc).: Pt was about to leave for dialysis when she started having severe back spasms on the right side. Pt has hx of back surgery in 2001 and was seen yesterday for same sx at [facility] where they found loose screws in back. Pt did not get dialysis today.        C= \"Have you ever felt that you should Cut down on your drinking?\"  No  A= \"Have people Annoyed you by criticizing your drinking?\"  No  G= \"Have you ever felt bad or Guilty about your drinking?\"  No  E= \"Have you ever had a drink as an Eye-opener first thing in the morning to steady your nerves or to help a hangover?\"  No      Deferred []      Reason for deferring: N/A    *If yes to two or more: probable alcohol abuse.*

## 2024-08-14 NOTE — PROGRESS NOTES
Pharmacy Medication History Note      List of current medications patient is taking is complete.    Source of information: Reena flores Early order summary     Changes made to medication list:  Medications removed (include reason, ex. therapy complete or physician discontinued, noncompliance):  None     Medications flagged for provider review:  None     Medications added/doses adjusted:  Glycerin-hypromellose- 0.2-0.2-1% solution aplace 1 drop in both eyes twice daily as needed  Norco 5-325 mg every 4 hours as needed  Lidocaine 5% patches place 1 patch daily as needed for back pain    Other notes (ex. Recent course of antibiotics, Coumadin dosing):  N/A       Current Home Medication List at Time of Admission:  Prior to Admission medications    Medication Sig   glycerin-hypromellose- 0.2-0.2-1 % SOLN opthalmic solution Place 1 drop into both eyes 2 times daily as needed   lidocaine (LIDODERM) 5 % Place 1 patch onto the skin daily as needed for Pain (back) 12 hours on, 12 hours off.   HYDROcodone-acetaminophen (NORCO) 5-325 MG per tablet Take 1 tablet by mouth every 4 hours as needed for Pain. Max Daily Amount: 6 tablets   bumetanide (BUMEX) 1 MG tablet Take 3 tablets by mouth four times a week On non dialysis days  Patient taking differently: Take 3 tablets by mouth four times a week On non dialysis days Tues, Thurs, Sat, Sun   potassium chloride (MICRO-K) 10 MEQ extended release capsule Take 1 capsule by mouth 2 times daily   lidocaine-prilocaine (EMLA) 2.5-2.5 % cream Apply topically as needed for Pain Apply topically as needed.  Patient taking differently: Apply topically as needed for Pain (to fistula for dialysis) Apply topically as needed.   carvedilol (COREG) 3.125 MG tablet Take 1 tablet by mouth 2 times daily   BASAGLAR KWIKPEN 100 UNIT/ML injection pen INJECT 72 UNITS SUBCUTANEOUSLY IN THE MORNING AND 42 AT BEDTIME   epoetin raphael (PROCRIT) 3000 UNIT/ML injection Inject 1 mL into the skin  <100 during dialysis)   Melatonin 10 MG TABS Take 10 mg by mouth nightly as needed (for sleep)    Calcium Polycarbophil (FIBER-CAPS PO) Take 2 capsules by mouth in the morning and at bedtime         Please let me know if you have any questions about this encounter. Thank you!    Electronically signed by Maddie Velez RPH on 8/14/2024 at 4:25 PM

## 2024-08-14 NOTE — ED NOTES
Report given to DEDE Sevilla from [facility].   Report method by phone   The following was reviewed with receiving RN:   Current vital signs:  BP (!) 93/55   Pulse 75   Temp 98.8 °F (37.1 °C) (Oral)   Resp 21   Ht 1.651 m (5' 5\")   Wt 108.9 kg (240 lb)   SpO2 95%   BMI 39.94 kg/m²                MEWS Score: 1     Any medication or safety alerts were reviewed. Any pending diagnostics and notifications were also reviewed, as well as any safety concerns or issues, abnormal labs, abnormal imaging, and abnormal assessment findings. Questions were answered.

## 2024-08-14 NOTE — ED PROVIDER NOTES
EMERGENCY DEPARTMENT ENCOUNTER    Pt Name: Estefany Garcia  MRN: [id number]  Birthdate 1960  Date of evaluation: 8/14/24  CHIEF COMPLAINT       Chief Complaint   Patient presents with    Spasms    Back Pain     HISTORY OF PRESENT ILLNESS   HPI  Lumbar back pain, on right side, 4 days    Dr Barrios was spine surgeon, 2001, lumbar surgery, has appointment tomorrow with him    Staying at [facility], past 6 weeks  Last dialysis Monday    Was on her way to dialysis this morning when she developed some increasing pain.  She has had pain over the past 4 days.  She went to an ER yesterday at [facility], had CT thoracic and lumbar spine was told there is possible small hardware fracture.  She has an appointment tomorrow with her spine physician.  She gets dialysis Monday Wednesday Friday.  Last dialysis was Monday.  Left arm AV fistula      REVIEW OF SYSTEMS     Review of Systems   All other systems reviewed and are negative.    PASTMEDICAL HISTORY     Past Medical History:   Diagnosis Date    Arthritis     Backache, unspecified     CAD (coronary artery disease)     Cerebral artery occlusion with cerebral infarction (HCC)     CHF (congestive heart failure) (HCC)     Chronic kidney disease     Coronary atherosclerosis of artery bypass graft     COVID 01/31/2022    Cramp of limb     Epistaxis 03/05/2023    Gallstones     Hemodialysis patient (HCC)     MONDAY WEDNESDAY AND FRIDAYS  /  [facility] [place]    Hyperlipidemia     Hypertension     Insomnia     Neuromuscular disorder ([facility])     Pneumonia     Psychiatric problem     Thyroid disease     Type II or unspecified type diabetes mellitus with renal manifestations, not stated as uncontrolled(250.40)     Type II or unspecified type diabetes mellitus without mention of complication, not stated as uncontrolled     Unspecified vitamin D deficiency      Past Problem List  Patient Active Problem List   Diagnosis Code    Atherosclerosis of coronary artery bypass graft of  PROCEDURE N/A 11/03/2023    taleb / Coronary angiography / op scmh performed by Jairo Hurley MD at [facility] CARDIAC CATH LAB    CARDIAC PROCEDURE N/A 11/15/2023    ron / Percutaneous coronary intervention performed by Jairo Hurley MD at [facility] CARDIAC CATH LAB    CARDIAC PROCEDURE N/A 02/28/2024    taleb / Left heart cath / op scmh performed by Jairo Hurley MD at [facility] CARDIAC CATH LAB    CARDIAC PROCEDURE N/A 02/28/2024    Percutaneous coronary intervention performed by Jairo Hurley MD at [facility] CARDIAC CATH LAB    CARDIAC SURGERY      CARPAL TUNNEL RELEASE      CHOLECYSTECTOMY, OPEN N/A     COLONOSCOPY      CORONARY ANGIOPLASTY WITH STENT PLACEMENT  02/14/2023    PTCA / FADIA RCA    CORONARY ANGIOPLASTY WITH STENT PLACEMENT  2019    STENT X1 AT [facility]    CORONARY ANGIOPLASTY WITH STENT PLACEMENT  11/03/2023    DR HURLEY  /  FADIA SVG-DIAG  /  NEEDS RCA DONE    CORONARY ARTERY BYPASS GRAFT  02/2005    X3 [facility]    DIALYSIS CATHETER INSERTION Right 04/13/2023    CVC CATHETER REPLACEMENT right chest performed by Bib Marinelli MD at [facility] CVOR    DIALYSIS FISTULA CREATION Left 12/14/2021    LEFT AV FISTULA CREATION UPPER EXTREMITY performed by Jairo Singh MD at [facility] CVOR    DIALYSIS FISTULA CREATION Left 04/19/2023    LEFT AV FISTULA REVISION WITH SUPERFICIALIZATION performed by [facility]elli MD at [facility]    EYE SURGERY      FISTULAGRAM Left 4/25/2024    bren / UPPER EXTREMITY FISTULAGRAM performed by Bib Marinelli MD at [facility] CVOR    FISTULAGRAM Left 7/1/2024    FISTULAGRAM UPPER EXTREMITY LEFT performed by Bib Marinelli MD at [facility] OR    FRACTURE SURGERY      HAND SURGERY      IR TUNNELED CATHETER PLACEMENT GREATER THAN 5 YEARS  08/18/2021    IR TUNNELED CATHETER PLACEMENT GREATER THAN 5 YEARS 8/18/2021 [facility] SPECIAL PROCEDURES    IR TUNNELED CATHETER PLACEMENT GREATER THAN 5 YEARS  03/03/2023    IR TUNNELED CATHETER PLACEMENT GREATER THAN 5 YEARS

## 2024-08-14 NOTE — PROGRESS NOTES
Contacted ER RN to get an idea of time and location of dialysis if its ordered today.  Order for portable stat Chest Xray placed at 1315 today

## 2024-08-15 PROBLEM — U07.1 COVID: Status: RESOLVED | Noted: 2022-01-31 | Resolved: 2024-08-15

## 2024-08-15 PROBLEM — R09.89 PULMONARY CONGESTION: Status: ACTIVE | Noted: 2024-08-15

## 2024-08-15 PROBLEM — E87.70 FLUID OVERLOAD: Status: RESOLVED | Noted: 2024-08-14 | Resolved: 2024-08-15

## 2024-08-15 LAB
ANION GAP SERPL CALCULATED.3IONS-SCNC: 17 MMOL/L (ref 9–17)
BASOPHILS # BLD: 0.07 K/UL (ref 0–0.2)
BASOPHILS NFR BLD: 1 % (ref 0–2)
BUN SERPL-MCNC: 24 MG/DL (ref 8–23)
CALCIUM SERPL-MCNC: 9.2 MG/DL (ref 8.6–10.4)
CHLORIDE SERPL-SCNC: 94 MMOL/L (ref 98–107)
CO2 SERPL-SCNC: 26 MMOL/L (ref 20–31)
CREAT SERPL-MCNC: 4.1 MG/DL (ref 0.5–0.9)
EKG ATRIAL RATE: 80 BPM
EKG P AXIS: 62 DEGREES
EKG P-R INTERVAL: 188 MS
EKG Q-T INTERVAL: 370 MS
EKG QRS DURATION: 100 MS
EKG QTC CALCULATION (BAZETT): 426 MS
EKG R AXIS: 30 DEGREES
EKG T AXIS: 151 DEGREES
EKG VENTRICULAR RATE: 80 BPM
EOSINOPHIL # BLD: 0.21 K/UL (ref 0–0.4)
EOSINOPHILS RELATIVE PERCENT: 3 % (ref 0–4)
ERYTHROCYTE [DISTWIDTH] IN BLOOD BY AUTOMATED COUNT: 19.7 % (ref 11.5–14.9)
EST. AVERAGE GLUCOSE BLD GHB EST-MCNC: 111 MG/DL
GFR, ESTIMATED: 11 ML/MIN/1.73M2
GLUCOSE BLD-MCNC: 107 MG/DL (ref 65–105)
GLUCOSE BLD-MCNC: 117 MG/DL (ref 65–105)
GLUCOSE BLD-MCNC: 117 MG/DL (ref 65–105)
GLUCOSE BLD-MCNC: 142 MG/DL (ref 65–105)
GLUCOSE BLD-MCNC: 179 MG/DL (ref 65–105)
GLUCOSE SERPL-MCNC: 116 MG/DL (ref 70–99)
HBA1C MFR BLD: 5.5 % (ref 4–6)
HCT VFR BLD AUTO: 29.4 % (ref 36–46)
HGB BLD-MCNC: 9.5 G/DL (ref 12–16)
LYMPHOCYTES NFR BLD: 1.19 K/UL (ref 1–4.8)
LYMPHOCYTES RELATIVE PERCENT: 17 % (ref 24–44)
MCH RBC QN AUTO: 38.9 PG (ref 26–34)
MCHC RBC AUTO-ENTMCNC: 32.3 G/DL (ref 31–37)
MCV RBC AUTO: 120.5 FL (ref 80–100)
MONOCYTES NFR BLD: 0.56 K/UL (ref 0.1–1.3)
MONOCYTES NFR BLD: 8 % (ref 1–7)
MORPHOLOGY: ABNORMAL
NEUTROPHILS NFR BLD: 71 % (ref 36–66)
NEUTS SEG NFR BLD: 4.97 K/UL (ref 1.3–9.1)
PATH REV BLD -IMP: NORMAL
PLATELET # BLD AUTO: 162 K/UL (ref 150–450)
PMV BLD AUTO: 8.6 FL (ref 6–12)
POTASSIUM SERPL-SCNC: 4.5 MMOL/L (ref 3.7–5.3)
RBC # BLD AUTO: 2.44 M/UL (ref 4–5.2)
SODIUM SERPL-SCNC: 137 MMOL/L (ref 135–144)
SURGICAL PATHOLOGY REPORT: NORMAL
WBC OTHER # BLD: 7 K/UL (ref 3.5–11)

## 2024-08-15 PROCEDURE — 80048 BASIC METABOLIC PNL TOTAL CA: CPT

## 2024-08-15 PROCEDURE — 6360000002 HC RX W HCPCS: Performed by: FAMILY MEDICINE

## 2024-08-15 PROCEDURE — 1200000000 HC SEMI PRIVATE

## 2024-08-15 PROCEDURE — 85025 COMPLETE CBC W/AUTO DIFF WBC: CPT

## 2024-08-15 PROCEDURE — 99223 1ST HOSP IP/OBS HIGH 75: CPT | Performed by: FAMILY MEDICINE

## 2024-08-15 PROCEDURE — 82947 ASSAY GLUCOSE BLOOD QUANT: CPT

## 2024-08-15 PROCEDURE — 36415 COLL VENOUS BLD VENIPUNCTURE: CPT

## 2024-08-15 PROCEDURE — 99222 1ST HOSP IP/OBS MODERATE 55: CPT | Performed by: INTERNAL MEDICINE

## 2024-08-15 PROCEDURE — 6370000000 HC RX 637 (ALT 250 FOR IP): Performed by: FAMILY MEDICINE

## 2024-08-15 PROCEDURE — 2580000003 HC RX 258: Performed by: FAMILY MEDICINE

## 2024-08-15 RX ORDER — MORPHINE SULFATE 4 MG/ML
4 INJECTION, SOLUTION INTRAMUSCULAR; INTRAVENOUS
Status: DISCONTINUED | OUTPATIENT
Start: 2024-08-15 | End: 2024-08-15 | Stop reason: SDUPTHER

## 2024-08-15 RX ORDER — HYDROCODONE BITARTRATE AND ACETAMINOPHEN 5; 325 MG/1; MG/1
1 TABLET ORAL EVERY 6 HOURS PRN
Status: DISCONTINUED | OUTPATIENT
Start: 2024-08-15 | End: 2024-08-18

## 2024-08-15 RX ORDER — MORPHINE SULFATE 2 MG/ML
2 INJECTION, SOLUTION INTRAMUSCULAR; INTRAVENOUS
Status: DISCONTINUED | OUTPATIENT
Start: 2024-08-15 | End: 2024-08-15 | Stop reason: SDUPTHER

## 2024-08-15 RX ADMIN — BUSPIRONE HYDROCHLORIDE 10 MG: 10 TABLET ORAL at 09:21

## 2024-08-15 RX ADMIN — ACETAMINOPHEN 650 MG: 325 TABLET ORAL at 09:04

## 2024-08-15 RX ADMIN — Medication 400 MG: at 21:08

## 2024-08-15 RX ADMIN — SODIUM BICARBONATE 1300 MG: 650 TABLET ORAL at 09:05

## 2024-08-15 RX ADMIN — ALLOPURINOL 100 MG: 100 TABLET ORAL at 09:05

## 2024-08-15 RX ADMIN — SODIUM BICARBONATE 1300 MG: 650 TABLET ORAL at 21:08

## 2024-08-15 RX ADMIN — HYDROCODONE BITARTRATE AND ACETAMINOPHEN 1 TABLET: 5; 325 TABLET ORAL at 11:15

## 2024-08-15 RX ADMIN — HEPARIN SODIUM 5000 UNITS: 5000 INJECTION INTRAVENOUS; SUBCUTANEOUS at 21:09

## 2024-08-15 RX ADMIN — SEVELAMER CARBONATE 1600 MG: 800 TABLET, FILM COATED ORAL at 17:28

## 2024-08-15 RX ADMIN — ASPIRIN 81 MG 81 MG: 81 TABLET ORAL at 09:05

## 2024-08-15 RX ADMIN — CALCIUM POLYCARBOPHIL 1250 MG: 625 TABLET ORAL at 21:10

## 2024-08-15 RX ADMIN — BUSPIRONE HYDROCHLORIDE 10 MG: 10 TABLET ORAL at 17:27

## 2024-08-15 RX ADMIN — SODIUM CHLORIDE, PRESERVATIVE FREE 10 ML: 5 INJECTION INTRAVENOUS at 09:03

## 2024-08-15 RX ADMIN — SODIUM CHLORIDE, PRESERVATIVE FREE 10 ML: 5 INJECTION INTRAVENOUS at 21:10

## 2024-08-15 RX ADMIN — HEPARIN SODIUM 5000 UNITS: 5000 INJECTION INTRAVENOUS; SUBCUTANEOUS at 05:59

## 2024-08-15 RX ADMIN — CARVEDILOL 3.12 MG: 3.12 TABLET, FILM COATED ORAL at 21:08

## 2024-08-15 RX ADMIN — INSULIN LISPRO 20 UNITS: 100 INJECTION, SOLUTION INTRAVENOUS; SUBCUTANEOUS at 09:21

## 2024-08-15 RX ADMIN — BUMETANIDE 3 MG: 1 TABLET ORAL at 09:04

## 2024-08-15 RX ADMIN — INSULIN GLARGINE 42 UNITS: 100 INJECTION, SOLUTION SUBCUTANEOUS at 21:08

## 2024-08-15 RX ADMIN — POTASSIUM CHLORIDE 10 MEQ: 750 CAPSULE, EXTENDED RELEASE ORAL at 09:05

## 2024-08-15 RX ADMIN — CYCLOBENZAPRINE 5 MG: 10 TABLET, FILM COATED ORAL at 09:05

## 2024-08-15 RX ADMIN — CARVEDILOL 3.12 MG: 3.12 TABLET, FILM COATED ORAL at 09:01

## 2024-08-15 RX ADMIN — PRASUGREL 10 MG: 10 TABLET, FILM COATED ORAL at 09:01

## 2024-08-15 RX ADMIN — SEVELAMER CARBONATE 1600 MG: 800 TABLET, FILM COATED ORAL at 09:04

## 2024-08-15 RX ADMIN — VENLAFAXINE HYDROCHLORIDE 75 MG: 75 CAPSULE, EXTENDED RELEASE ORAL at 09:04

## 2024-08-15 RX ADMIN — FENTANYL CITRATE 25 MCG: 0.05 INJECTION, SOLUTION INTRAMUSCULAR; INTRAVENOUS at 12:31

## 2024-08-15 RX ADMIN — FENTANYL CITRATE 25 MCG: 0.05 INJECTION, SOLUTION INTRAMUSCULAR; INTRAVENOUS at 21:09

## 2024-08-15 RX ADMIN — CALCIUM POLYCARBOPHIL 1250 MG: 625 TABLET ORAL at 09:03

## 2024-08-15 RX ADMIN — FENTANYL CITRATE 25 MCG: 0.05 INJECTION, SOLUTION INTRAMUSCULAR; INTRAVENOUS at 18:25

## 2024-08-15 RX ADMIN — FENTANYL CITRATE 25 MCG: 0.05 INJECTION, SOLUTION INTRAMUSCULAR; INTRAVENOUS at 14:49

## 2024-08-15 RX ADMIN — CYCLOBENZAPRINE 5 MG: 10 TABLET, FILM COATED ORAL at 17:27

## 2024-08-15 RX ADMIN — DOCUSATE SODIUM 100 MG: 100 CAPSULE, LIQUID FILLED ORAL at 21:08

## 2024-08-15 RX ADMIN — Medication 9 MG: at 21:31

## 2024-08-15 RX ADMIN — ATORVASTATIN CALCIUM 80 MG: 80 TABLET, FILM COATED ORAL at 21:08

## 2024-08-15 RX ADMIN — INSULIN GLARGINE 72 UNITS: 100 INJECTION, SOLUTION SUBCUTANEOUS at 09:00

## 2024-08-15 RX ADMIN — BUSPIRONE HYDROCHLORIDE 10 MG: 10 TABLET ORAL at 21:08

## 2024-08-15 RX ADMIN — POTASSIUM CHLORIDE 10 MEQ: 750 CAPSULE, EXTENDED RELEASE ORAL at 21:08

## 2024-08-15 RX ADMIN — HYDROCODONE BITARTRATE AND ACETAMINOPHEN 1 TABLET: 5; 325 TABLET ORAL at 17:26

## 2024-08-15 RX ADMIN — DOCUSATE SODIUM 100 MG: 100 CAPSULE, LIQUID FILLED ORAL at 09:03

## 2024-08-15 RX ADMIN — FENTANYL CITRATE 25 MCG: 0.05 INJECTION, SOLUTION INTRAMUSCULAR; INTRAVENOUS at 10:07

## 2024-08-15 ASSESSMENT — PAIN DESCRIPTION - LOCATION
LOCATION: BACK

## 2024-08-15 ASSESSMENT — PAIN SCALES - GENERAL
PAINLEVEL_OUTOF10: 8
PAINLEVEL_OUTOF10: 10
PAINLEVEL_OUTOF10: 8
PAINLEVEL_OUTOF10: 10
PAINLEVEL_OUTOF10: 10
PAINLEVEL_OUTOF10: 3
PAINLEVEL_OUTOF10: 10
PAINLEVEL_OUTOF10: 0
PAINLEVEL_OUTOF10: 8
PAINLEVEL_OUTOF10: 10

## 2024-08-15 ASSESSMENT — PAIN DESCRIPTION - ORIENTATION
ORIENTATION: MID;LOWER
ORIENTATION: LOWER
ORIENTATION: LOWER;RIGHT
ORIENTATION: RIGHT;MID;LOWER

## 2024-08-15 ASSESSMENT — PAIN - FUNCTIONAL ASSESSMENT: PAIN_FUNCTIONAL_ASSESSMENT: PREVENTS OR INTERFERES SOME ACTIVE ACTIVITIES AND ADLS

## 2024-08-15 ASSESSMENT — PAIN DESCRIPTION - DESCRIPTORS
DESCRIPTORS: SHARP
DESCRIPTORS: SPASM;STABBING
DESCRIPTORS: STABBING
DESCRIPTORS: SPASM
DESCRIPTORS: SHARP;STABBING

## 2024-08-15 NOTE — PROGRESS NOTES
08/15/24 1554   Encounter Summary   Encounter Overview/Reason  Encounter   Service Provided For Patient   Last Encounter  08/15/24   Rituals, Rites and Sacraments   Type Sacrament of Sick;Anointing   Assessment/Intervention/Outcome   Intervention Prayer (assurance of)/[place]

## 2024-08-15 NOTE — PROGRESS NOTES
Occupational Therapy  DATE: 8/15/2024    NAME: Estefany Garcia  MRN: [id number]   : 1960    Patient not seen this date for Occupational Therapy due to:      [] Cancel by RN or physician due to:    [] Hemodialysis    [] Critical Lab Value Level     [] Blood transfusion in progress    [] Acute or unstable cardiovascular status   _MAP < 55 or more than >115  _HR < 40 or > 130    [] Acute or unstable pulmonary status   -FiO2 > 60%   _RR < 5 or >40    _O2 sats < 85%    [] Strict Bedrest    [] Off Unit for surgery or procedure    [] Off Unit for testing       [] Pending imaging to R/O fracture    [] Refusal by Patient      [] Intubated    [x] Other   8- at 954:  HOLD OT evaluation.  CT scan of the lumbar region shows IMPRESSION:   1. Posterior spinal fixation changes with questionable loosening of the right L4 transpedicular screw, though findings may be artifactual due to technique. Correlation with clinical presentation and continued attention on follow-up, as clinically warranted.     2.  Multilevel degenerative changes with at least moderate bilateral foraminal stenosis at L3-L4 and to a lesser extent L2-L3, progressed from CT of 2021. Of note, MRI is more sensitive for the evaluation of spinal canal or foraminal stenosis.      Will await to see if ortho is consulted and clears patient. Case discussed w/ nurse Kaely and charge nurse Edin.      [] OT being discontinued at this time. Patient independent. No further needs.     [] OT being discontinued at this time as the patient has been transferred to hospice care. No further needs.      Rosie Ferreira, TIERRA, OTR/L

## 2024-08-15 NOTE — CONSULTS
[facility]    Consult                        Today's Date: 8/15/2024  Patient Name: Estefany Garica  Date of admission: 8/14/2024 10:46 AM  Patient's age: 64 y.o., 1960  Admission Dx: ESRD (end stage renal disease) on dialysis (HCC) [N18.6, Z99.2]  Fluid overload [E87.70]  Acute exacerbation of chronic low back pain [M54.50, G89.29]  Hypervolemia, unspecified hypervolemia type [E87.70]    Reason for Consult:  Cardiac evaluation    Requesting Physician: Madonna Aviles MD    CHIEF COMPLAINT:  Back spasm/back pain.     History Obtained From:  patient, electronic medical record    HISTORY OF PRESENT ILLNESS:      The patient is a 64 y.o.  female who is admitted to the hospital for right-sided back pain.  Patient also reported having shortness of breath.  Cardiology is consulted for shortness of breath.  Patient denies of any chest pain.  Patient has history of coronary artery disease/coronary artery bypass grafting and had a cardiac catheterization 2/2024 at that time PCI of RCA and RPDA was performed.    Patient is on dialysis    Past Medical History:   has a past medical history of Arthritis, Backache, unspecified, CAD (coronary artery disease), Cerebral artery occlusion with cerebral infarction ([facility]), CHF (congestive heart failure) ([facility]), Chronic kidney disease, Coronary atherosclerosis of artery bypass graft, COVID, Cramp of limb, Epistaxis, Gallstones, Hemodialysis patient ([facility]), Hyperlipidemia, Hypertension, Insomnia, Neuromuscular disorder ([facility]), Pneumonia, Psychiatric problem, Thyroid disease, Type II or unspecified type diabetes mellitus with renal manifestations, not stated as uncontrolled(250.40), Type II or unspecified type diabetes mellitus without mention of complication, not stated as uncontrolled, and Unspecified vitamin D deficiency.    Past Surgical History:   has a past surgical history that includes Coronary artery bypass graft (02/2005); Knee arthroscopy; Carpal  heart with angina pectoris (HCC)    Acute kidney injury superimposed on CKD (HCC)    Chronic diastolic heart failure (HCC)    Diabetic polyneuropathy associated with type 2 diabetes mellitus ([facility])    History of coronary artery bypass graft    Iron deficiency anemia    Spinal stenosis of lumbar region with neurogenic claudication    Mixed hyperlipidemia    CKD (chronic kidney disease) stage 4, GFR 15-29 ml/min ([facility])    Type 2 diabetes 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, with long-term current use of insulin ([facility])    Obesity, Class II, BMI 35-39.9    Thyroid nodule greater than or equal to 1 cm in diameter incidentally noted on imaging study    Essential hypertension    Anemia of chronic disease    Chronic ischemic heart disease    Ischemic stroke of frontal lobe ([facility])    Morbid obesity with BMI of 40.0-44.9, adult ([facility])    Disequilibrium syndrome    Anxiety    Chronic midline low back pain with bilateral sciatica    Acute thoracic back pain    Delirium due to another medical condition    Cerebral hypoperfusion    Immature arteriovenous fistula ([facility])    History of fusion of cervical spine    Depression with anxiety    Vancomycin resistant Enterococcus UTI    ESRD on hemodialysis ([facility])    Dialysis disequilibrium syndrome    Acute cystitis without hematuria    VRE infection (vancomycin resistant Enterococcus)    Infection due to ESBL-producing Klebsiella pneumoniae    Class 2 severe obesity due to excess calories with serious comorbidity and body mass index (BMI) of 35.0 to 35.9 in adult ([facility])    Type 2 diabetes mellitus with hyperglycemia, with long-term current use of insulin ([facility])    Right hemiparesis ([facility])    Ulcer of left foot, limited to breakdown of skin ([facility])    Secondary hyperparathyroidism ([facility])    Hypertensive urgency    Hypoxia    Congestive heart failure ([facility])    Nephrotic range proteinuria    Acute respiratory failure with hypoxia ([facility])    Syncope and collapse    Subacute cough     Acute on chronic heart failure, unspecified heart failure type (HCC)    Diarrhea of presumed infectious origin    Epistaxis    Chronic respiratory failure with hypoxia (HCC)    Chest pain    Hemodialysis catheter dysfunction (HCC)    Dialysis AV fistula malfunction (HCC)    ESRD (end stage renal disease) (HCC)    ESRD (end stage renal disease) on dialysis (HCC)    Hypokalemia    ERICK (obstructive sleep apnea)    AMS (altered mental status)    Unstable angina (HCC)    Acute electrocardiogram changes    Renovascular hypertension    Episodic confusion    Chest pain with high risk for cardiac etiology    NSTEMI (non-ST elevated myocardial infarction) ([facility])    Angina at rest ([facility])    S/P angioplasty with stent    H/O heart artery stent    Abdominal distension    Abnormal stress test    Status post cardiac catheterization    Hx of type 2 diabetes mellitus    Bilateral edema of lower extremity    Closed fracture of right distal radius and ulna, initial encounter    Closed fracture of right wrist    End-stage renal disease needing dialysis ([facility])    Pulmonary congestion     Acute on chronic diastolic CHF  ESRD- on HD  CAD/CABG stable  Anemia    RECOMMENDATIONS:  Continue ASA, Effient and statin  Continue coreg  Volume and electrolytes management per nephrology      Discussed with patient and Nurse.    Vlad Beard MD, MD [facility] Consult           [phone]

## 2024-08-15 NOTE — DISCHARGE INSTR - COC
Continuity of Care Form    Patient Name: Etsefany Garcia   :  1960  MRN:  [id number]    Admit date:  2024  Discharge date:  ***    Code Status Order: Full Code   Advance Directives:   Advance Care Flowsheet Documentation             Admitting Physician:  Madonna Aviles MD  PCP: Zulma Payne, APRN - CNP    Discharging Nurse: ***  Discharging Hospital Unit/Room#: 2048/2048-01  Discharging Unit Phone Number: ***    Emergency Contact:   Extended Emergency Contact Information  Primary Emergency Contact: Hailey Guido  Home Phone: [phone]  Relation: Brother/Sister  Secondary Emergency Contact: Nancy Perdomo  Home Phone: [phone]  Relation: Brother/Sister    Past Surgical History:  Past Surgical History:   Procedure Laterality Date    ANKLE FRACTURE SURGERY      AV FISTULA CREATION  2021    BACK SURGERY      BREAST REDUCTION SURGERY      BREAST SURGERY      CARDIAC CATHETERIZATION      MVD  /  CT CONSULT    CARDIAC PROCEDURE N/A 2023    ron / Coronary angiography / op scmh performed by Jairo Ceja MD at [facility] CARDIAC CATH LAB    CARDIAC PROCEDURE N/A 11/15/2023    ron / Percutaneous coronary intervention performed by Jairo Ceja MD at [facility] CARDIAC CATH LAB    CARDIAC PROCEDURE N/A 2024    ron / Left heart cath / op scmh performed by Jairo Ceja MD at [facility] CARDIAC CATH LAB    CARDIAC PROCEDURE N/A 2024    Percutaneous coronary intervention performed by Jairo Ceja MD at [facility] CARDIAC CATH LAB    CARDIAC SURGERY      CARPAL TUNNEL RELEASE      CHOLECYSTECTOMY, OPEN N/A     COLONOSCOPY      CORONARY ANGIOPLASTY WITH STENT PLACEMENT  2023    PTCA / FADIA RCA    CORONARY ANGIOPLASTY WITH STENT PLACEMENT  2019    STENT X1 AT [facility]    CORONARY ANGIOPLASTY WITH STENT PLACEMENT  2023    DR CEJA  /  FADIA SVG-DIAG  /  NEEDS RCA DONE    CORONARY ARTERY BYPASS GRAFT  02/2005    X3 [facility]    DIALYSIS CATHETER INSERTION Right    Administered Date(s) Administered    COVID-19, MODERNA Booster BLUE border, (age 18y+), IM, 50mcg/0.25mL 05/12/2022    COVID-19, PFIZER PURPLE top, DILUTE for use, (age 12 y+), 30mcg/0.3mL 03/19/2021, 04/23/2021    Influenza Virus Vaccine 10/18/2018, 09/10/2019, 11/03/2020    Influenza, AFLURIA (age 3 yrs+), FLUZONE, (age 6 mo+), MDV, 0.5mL 10/16/2017    Influenza, FLUARIX, FLULAVAL, FLUZONE (age 6 mo+) and AFLURIA, (age 3 y+), Quadv PF, 0.5mL 10/18/2018, 09/10/2019, 09/20/2019, 11/03/2020    Influenza, FLUCELVAX, (age 6 mo+), MDCK, Quadv PF, 0.5mL 09/30/2021    Pneumococcal, PCV-13, PREVNAR 13, (age 6w+), IM, 0.5mL 08/06/2019    Pneumococcal, PPSV23, PNEUMOVAX 23, (age 2y+), SC/IM, 0.5mL 10/30/2014    TDaP, ADACEL (age 10y-64y), BOOSTRIX (age 10y+), IM, 0.5mL 11/18/2017, 06/09/2024    Zoster Recombinant (Shingrix) 09/23/2022, 01/10/2023       Active Problems:  Patient Active Problem List   Diagnosis Code    Atherosclerosis of coronary artery bypass graft of native heart with angina pectoris ([facility]) I25.709    Acute kidney injury superimposed on CKD ([facility]) N17.9, N18.9    Chronic diastolic heart failure ([facility]) I50.32    Diabetic polyneuropathy associated with type 2 diabetes mellitus ([facility]) E11.42    History of coronary artery bypass graft Z95.1    Iron deficiency anemia D50.9    Spinal stenosis of lumbar region with neurogenic claudication M48.062    Mixed hyperlipidemia E78.2    CKD (chronic kidney disease) stage 4, GFR 15-29 ml/min ([facility]) N18.4    Type 2 diabetes 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, with long-term current use of insulin (HCC) E11.22, N18.6, Z99.2, Z79.4    Obesity, Class II, BMI 35-39.9 E66.9    Thyroid nodule greater than or equal to 1 cm in diameter incidentally noted on imaging study E04.1    Essential hypertension I10    Anemia of chronic disease D63.8    Chronic ischemic heart disease I25.9    Ischemic stroke of frontal lobe (HCC) I63.9    Morbid obesity with BMI of  40.0-44.9, adult ([facility]) E66.01, Z68.41    Disequilibrium syndrome E87.8    Anxiety F41.9    Chronic midline low back pain with bilateral sciatica M54.41, M54.42, G89.29    Acute thoracic back pain M54.6    Delirium due to another medical condition F05    Cerebral hypoperfusion I67.9    Immature arteriovenous fistula ([facility]) I77.0    History of fusion of cervical spine Z98.1    Depression with anxiety F41.8    Vancomycin resistant Enterococcus UTI A49.1, Z16.21    ESRD on hemodialysis ([facility]) N18.6, Z99.2    Dialysis disequilibrium syndrome E87.8    Acute cystitis without hematuria N30.00    VRE infection (vancomycin resistant Enterococcus) A49.1, Z16.21    Infection due to ESBL-producing Klebsiella pneumoniae A49.8, Z16.12    Class 2 severe obesity due to excess calories with serious comorbidity and body mass index (BMI) of 35.0 to 35.9 in adult ([facility]) E66.01, Z68.35    Type 2 diabetes mellitus with hyperglycemia, with long-term current use of insulin ([facility]) E11.65, Z79.4    Right hemiparesis ([facility]) G81.91    Ulcer of left foot, limited to breakdown of skin ([facility]) L97.521    Secondary hyperparathyroidism ([facility]) N25.81    Hypertensive urgency I16.0    Hypoxia R09.02    Congestive heart failure ([facility]) I50.9    Nephrotic range proteinuria R80.9    Acute respiratory failure with hypoxia ([facility]) J96.01    Syncope and collapse R55    Subacute cough R05.2    Acute on chronic heart failure, unspecified heart failure type ([facility]) I50.9    Diarrhea of presumed infectious origin R19.7    Epistaxis R04.0    Chronic respiratory failure with hypoxia ([facility]) J96.11    Chest pain R07.9    Hemodialysis catheter dysfunction ([facility]) T82.41XA    Dialysis AV fistula malfunction ([facility]) T82.590A    ESRD (end stage renal disease) ([facility]) N18.6    ESRD (end stage renal disease) on dialysis ([facility]) N18.6, Z99.2    Hypokalemia E87.6    ERICK (obstructive sleep apnea) G47.33    AMS (altered mental status) R41.82    Unstable angina ([facility]) I20.0    Acute electrocardiogram

## 2024-08-15 NOTE — PLAN OF CARE
Problem: Discharge Planning  Goal: Discharge to home or other facility with appropriate resources  Outcome: Progressing     Problem: Safety - Adult  Goal: Free from fall injury  Outcome: Progressing  Flowsheets (Taken 8/14/2024 2223)  Free From Fall Injury: Instruct family/caregiver on patient safety     Problem: Skin/Tissue Integrity  Goal: Absence of new skin breakdown  Description: 1.  Monitor for areas of redness and/or skin breakdown  2.  Assess vascular access sites hourly  3.  Every 4-6 hours minimum:  Change oxygen saturation probe site  4.  Every 4-6 hours:  If on nasal continuous positive airway pressure, respiratory therapy assess nares and determine need for appliance change or resting period.  Outcome: Progressing     Problem: ABCDS Injury Assessment  Goal: Absence of physical injury  Outcome: Progressing      Not Applicable

## 2024-08-15 NOTE — PROGRESS NOTES
HEMODIALYSIS POST TREATMENT NOTE     Treatment time ordered: 210    Actual treatment time: 210     UltraFiltration Goal: 3500  UltraFiltration Removed: 3500        Pre Treatment weight:   Not able to get weight patient on ER Stecher   Post Treatment weight: Not able to get weight patient on ER Stecher   Estimated Dry Weight: TBD     Access used:     Central Venous Catheter:           Tunneled or Non-tunneled: na           Site: na           Access Flow: na       Internal Access:       AV Fistula or AV Graft: AVF          Site: ROBIN        Access Flow: 400        Sign and symptoms of infection: na       If YES: na     Medications or blood products given: see MAR     Chronic outpatient schedule: [facility]     Chronic outpatient unit: [facility]     Summary of response to treatment: Pt tx d/c all blood returned, dialyzer streaked, 3500 removed. Both needles pulled, and sites held 5 minutes each. BP soft during most of treatment Albumin 25 g and Midodrine 5 mg given for Blood pressure support.      Explain if orders NOT met, was physician notified:na        ACES flowsheet faxed to patient unit/ placed in patient chart: yes     Post assessment completed: yes     Report given to: Giovanna Flores RN        * Intra-treatment documented Safety Checks include the followin) Access and face visible at all times.     2) All connections and blood lines are secure with no kinks.     3) NVL alarm engaged.     4) Hemosafe device applied (if applicable).     5) No collapse of Arterial or Venous blood chambers.     6) All blood lines / pump segments in the air detectors.

## 2024-08-15 NOTE — PROGRESS NOTES
Patient admission questions completed, vital signs completed, standard safety measures in place, call light and patient belongings in reach.

## 2024-08-15 NOTE — PROGRESS NOTES
HEMODIALYSIS PRE-TREATMENT NOTE     Patient Identifiers prior to treatment: Name, , MRN     Isolation Required: yes                      Isolation Type: contact        (please document if patient is being managed as a PUI/COVID-19 patient)        Hepatitis status:        Date Drawn    Result      Hepatitis B Surface Antigen           negative      Hepatitis B Surface Antibody       3/18/2024    Pos > 1000      Hepatitis B Core Antibody       2024    negative            How was Hepatitis Status verified: results in EPIC     Was a copy of the labs you documented provided to facility for the patient's chart: yes     Hemodialysis orders verified: yes     Access Within normal limits ( I.e. s/s of infection,...): yes      Pre-Assessment completed: yes     Pre-dialysis report received from: Yahaira Sevilla RN Time: 1700

## 2024-08-15 NOTE — CARE COORDINATION
Case Management Assessment  Initial Evaluation    Date/Time of Evaluation: 8/15/2024 3:42 PM  Assessment Completed by: Jasmin Pfeiffer RN    If patient is discharged prior to next notation, then this note serves as note for discharge by case management.    Patient Name: Estefany Garcia                   YOB: 1960  Diagnosis: ESRD (end stage renal disease) on dialysis (HCC) [N18.6, Z99.2]  Fluid overload [E87.70]  Acute exacerbation of chronic low back pain [M54.50, G89.29]  Hypervolemia, unspecified hypervolemia type [E87.70]                   Date / Time: 8/14/2024 10:46 AM    Patient Admission Status: Inpatient   Readmission Risk (Low < 19, Mod (19-27), High > 27): Readmission Risk Score: 39.4    Current PCP: Zulma Payne APRN - CNP  PCP verified by CM? Yes    Chart Reviewed: Yes      History Provided by: Patient, Medical Record  Patient Orientation: Alert and Oriented    Patient Cognition: Alert    Hospitalization in the last 30 days (Readmission):  Yes    If yes, Readmission Assessment in CM Navigator will be completed.      Readmission Assessment  Number of Days since last admission?: 8-30 days  Previous Disposition: SNF  Who is being Interviewed: Patient  What was the patient's/caregiver's perception as to why they think they needed to return back to the hospital?: (S) Other (Comment) (severe back spasms/pain and missed HD)  Did you visit your Primary Care Physician after you left the hospital, before you returned this time?: No  Why weren't you able to visit your PCP?: (S) Did not have an appointment (unable to see outside PCP while admitted to SNF)  Did you see a specialist, such as Cardiac, Pulmonary, Orthopedic Physician, etc. after you left the hospital?: No  Who advised the patient to return to the hospital?: (S) Self-referral, Other (Comment) (Nursing staff)  Does the patient report anything that got in the way of taking their medications?: No  In our efforts to provide the best  possible care to you and others like you, can you think of anything that we could have done to help you after you left the hospital the first time, so that you might not have needed to return so soon?: Other (Comment) (no)   Advance Directives:      Code Status: Full Code   Patient's Primary Decision Maker is: Legal Next of Kin    Primary Decision Maker: HayHailey - Brother/Sister - [phone]    Secondary Decision Maker: Nancy Perdomo - Brother/Sister - [phone]    Discharge Planning:    Patient lives with: Alone Type of Home: Skilled Nursing Facility  Primary Care Giver: Self  Patient Support Systems include: Family Members   Current Financial resources: Medicare, Medicaid  Current community resources: (S) ECF/Home Care, Other (Comment) [facility]  Current services prior to admission: (S) Durable Medical Equipment, Other (Comment) (Db [facility] on MWF @ 10:15 AM)            Current DME: Cane, Shower Chair, Walker, Wheelchair            Type of Home Care services:  None    ADLS  Prior functional level: Independent in ADLs/IADLs, Assistance with the following:, Shopping, Housework (prior to admit to SNF)  Current functional level: Other (see comment) (WILL FOLLOW THERAPY EVAL)    PT AM-PAC:   /24  OT AM-PAC:   /24    Family can provide assistance at DC: No  Would you like Case Management to discuss the discharge plan with any other family members/significant others, and if so, who? No  Plans to Return to Present Housing: Yes  Other Identified Issues/Barriers to RETURNING to current housing: back pain   Potential Assistance needed at discharge: Skilled Nursing Facility, Transportation            Potential DME:  none  Patient expects to discharge to: Skilled nursing facility  Plan for transportation at discharge:  wheelchair van    Financial    Payor: [employer] DUAL BENEFITS / Plan: [employer] DUAL / Product Type: *No Product type* /     Does insurance  require precert for SNF: Yes    Potential assistance Purchasing Medications: No  Meds-to-Beds request: Yes      [facility] - [place], [place] - [address] -  [phone] - F [phone]  [address] [place] [address]  Phone: [phone] Fax: [phone]    [facility] - [place], [place] - [address] -  [phone] - F [phone]  [address] [address]  Phone: [phone] Fax: [phone]      Notes:    Factors facilitating achievement of predicted outcomes: Family support, Good insight into deficits, Has needed Durable Medical Equipment at home, and Knowledge about rehab    Barriers to discharge: Pain, Long standing deficits, and Medication managment    Additional Case Management Notes: 8/15/2024 (READMIT) from [facility]  wants to return (home alone prior); DME WC, SC, walker, cane, hospital bed, glucometer; VNS [id number] in past; Db [facility] on MWF @ 10:15AM; admit for back pain, had appt today, but missed HD yesterday; PT/OT; ANTHONY needs signed/completed    The Plan for Transition of Care is related to the following treatment goals of ESRD (end stage renal disease) on dialysis (HCC) [N18.6, Z99.2]  Fluid overload [E87.70]  Acute exacerbation of chronic low back pain [M54.50, G89.29]  Hypervolemia, unspecified hypervolemia type [E87.70]    IF APPLICABLE: The Patient and/or patient representative Estefany and her family were provided with a choice of provider and agrees with the discharge plan. Freedom of choice list with basic dialogue that supports the patient's individualized plan of care/goals and shares the quality data associated with the providers was provided to: Patient    The Patient and/or Patient Representative Agree with the Discharge Plan? Yes    Jasmin Pfeiffer RN  Case Management Department  Ph: [phone] Fax: [phone]

## 2024-08-15 NOTE — PROGRESS NOTES
08/15/24 1253   Encounter Summary   Encounter Overview/Reason Volunteer Encounter   Service Provided For Patient   Last Encounter  08/15/24   Rituals, Rites and Sacraments   Type Hinduism Communion   Assessment/Intervention/Outcome   Intervention Prayer (assurance of)/[place]

## 2024-08-15 NOTE — CONSULTS
Department of Internal Medicine  Nephrology Cherri Landis MD   Consult Note    Reason for consultation: Management of hemodialysis dependent End-stage renal disease.    Consulting physician: Madonna Aviles MD.    History of present illness: This is a 64 y.o. female with a significant past medical history of Systemic hypertension, osteoarthritis,  coronary artery disease [s/p CABG with subsequent graft stent], s/p cerebrovascular accident, type 2 diabetes mellitus and End-stage renal disease secondary to hypertensive and diabetic nephropathy [on routine hemodialysis Monday/Wednesday/Friday at [facility] dialysis unit [facility] urinary care of Dr. Graham using left arm AV fistula], who presented to the emergency department at [facility]  8/024 with complaints of right-sided mid back pain.  She has a history of spine surgery in 2001 and had been to the [facility] ED on 8/13/2024 with CT scan of the thoracic and lumbar spine suggesting possible small hardware fracture and she was scheduled to follow-up with her spine surgeon, Dr. Pederson on 8/15/2024    Blood pressure at presentation was 90/61 mmHg. Laboratory studies at presentation remarkable for BUN/creatinine 37/5.8 mg/dL.  She did receive acute hemodialysis yesterday evening after admission.  She feels better today.  Laboratory studies today were reviewed.    Adhesive tape, Metformin and related, Ace inhibitors, Iv dye [iodides], Nsaids, Metformin and related, and Silicone    Past Medical History:   Diagnosis Date    Arthritis     Backache, unspecified     CAD (coronary artery disease)     Cerebral artery occlusion with cerebral infarction (HCC)     CHF (congestive heart failure) (HCC)     Chronic kidney disease     Coronary atherosclerosis of artery bypass graft     COVID 01/31/2022    Cramp of limb     Epistaxis 03/05/2023    Gallstones     Hemodialysis patient (HCC)     MONDAY WEDNESDAY AND FRIDAYS  /  [facility] IN [place]     Relation Age of Onset    Diabetes Father     Heart Failure Father      Social History     Socioeconomic History    Marital status: Single     Spouse name: None    Number of children: None    Years of education: None    Highest education level: None   Tobacco Use    Smoking status: Never    Smokeless tobacco: Never   Vaping Use    Vaping status: Never Used   Substance and Sexual Activity    Alcohol use: No    Drug use: No    Sexual activity: Not Currently     Social Determinants of Health     Financial Resource Strain: Low Risk  (5/25/2023)    Overall Financial Resource Strain (CARDIA)     Difficulty of Paying Living Expenses: Not hard at all   Food Insecurity: No Food Insecurity (8/14/2024)    Hunger Vital Sign     Worried About Running Out of Food in the Last Year: Never true     Ran Out of Food in the Last Year: Never true   Transportation Needs: No Transportation Needs (8/14/2024)    PRAPARE - Transportation     Lack of Transportation (Medical): No     Lack of Transportation (Non-Medical): No   Physical Activity: Insufficiently Active (1/19/2023)    Exercise Vital Sign     Days of Exercise per Week: 1 day     Minutes of Exercise per Session: 20 min   Stress: Stress Concern Present (2/21/2022)    [employer] [place] [employer] - Occupational Stress Questionnaire     Feeling of Stress : To some extent   Social Connections: Socially Isolated (2/21/2022)    Social Connection and Isolation Panel [NHANES]     Frequency of Communication with Friends and Family: More than three times a week     Frequency of Social Gatherings with Friends and Family: More than three times a week     Attends Pentecostal Services: Never     Active Member of Clubs or Organizations: No     Attends Club or Organization Meetings: Never     Marital Status: Never     Received from [facility]    [place] Safety & Environment   Housing Stability: Low Risk  (8/14/2024)    Housing Stability Vital Sign

## 2024-08-15 NOTE — PROGRESS NOTES
Writer reached out to Dr. Aviles regarding severe back pain to see if consulting ortho would be beneficial.  Dr. Aviles stated that  patient was supposed to see Dr. Ramirez today and patient's sister was rescheduling appointment to see Dr. Ramirez.

## 2024-08-15 NOTE — H&P
Family Medicine Admit Note    PCP: Zulma Payne APRN - CNP    Date of Admission: 8/14/2024    Date of Service: Pt seen/examined on 8/15/2024 and Admitted to Inpatient     Chief Complaint:  back pain/spasm      History Of Present Illness:   The patient is a 64 y.o. female who presents to [facility] with increased right sided back pain for 4 days; she has a history of back surgery and had appointment to see Dr. Chu today about a possible hardware fracture.     This morning, she states she has some shortness of breath as well as the back pain that comes and goes. Her sister cancelled her appointment with Dr. Chu.    Past Medical History:        Diagnosis Date    Arthritis     Backache, unspecified     CAD (coronary artery disease)     Cerebral artery occlusion with cerebral infarction (HCC)     CHF (congestive heart failure) ([facility])     Chronic kidney disease     Coronary atherosclerosis of artery bypass graft     COVID 01/31/2022    Cramp of limb     Epistaxis 03/05/2023    Gallstones     Hemodialysis patient ([facility])     MONDAY WEDNESDAY AND FRIDAYS  /  [facility] IN [place]    Hyperlipidemia     Hypertension     Insomnia     Neuromuscular disorder ([facility])     Pneumonia     Psychiatric problem     Thyroid disease     Type II or unspecified type diabetes mellitus with renal manifestations, not stated as uncontrolled(250.40)     Type II or unspecified type diabetes mellitus without mention of complication, not stated as uncontrolled     Unspecified vitamin D deficiency        Past Surgical History:        Procedure Laterality Date    ANKLE FRACTURE SURGERY      AV FISTULA CREATION  12/14/2021    BACK SURGERY      BREAST REDUCTION SURGERY      BREAST SURGERY      CARDIAC CATHETERIZATION  2005    MVD  /  CT CONSULT    CARDIAC PROCEDURE N/A 11/03/2023    ron / Coronary angiography / op [facility] performed by Jairo Hurley MD at [facility] CARDIAC CATH LAB    CARDIAC PROCEDURE N/A 11/15/2023    taleb / Percutaneous  patient currently lives at [facility]    TOBACCO:   reports that she has never smoked. She has never used smokeless tobacco.  ETOH:   reports no history of alcohol use.      Family History:          Problem Relation Age of Onset    Diabetes Father     Heart Failure Father      ROS: sob, back pain    PHYSICAL EXAM:    BP (!) 101/45   Pulse 81   Temp 97.9 °F (36.6 °C)   Resp 17   Ht 1.651 m (5' 5\")   Wt 108.9 kg (240 lb)   SpO2 96%   BMI 39.94 kg/m²     General appearance: No apparent distress appears stated age and cooperative.  HEENT Normal cephalic, atraumatic without obvious deformity.    Neck: Supple, No jugular venous distention/bruits.    Lungs: Clear to auscultation, bilaterally without rales/wheezes/rhonchi with good respiratory effort.  Heart: Regular rate and rhythm with Normal S1/S2 without murmurs, rubs or gallops  Mental status: Alert, oriented, thought content appropriate.    CXR:  I have reviewed the CXR with the following interpretation: Cardiomegaly with pulmonary vascular congestion   EKG:  I have reviewed the EKG with the following interpretation: NSR    CBC   Recent Labs     08/14/24  1148   WBC 9.3   HGB 9.5*   HCT 29.5*         RENAL  Recent Labs     08/14/24  1148      K 4.5   CL 95*   CO2 25   BUN 37*   CREATININE 5.8*     LFT'S  Recent Labs     08/14/24  1148   AST 42*   ALT 24   BILITOT 0.8   ALKPHOS 187*     COAG  Recent Labs     08/14/24  1148   INR 1.3     CARDIAC ENZYMES  No results for input(s): \"CKTOTAL\", \"CKMB\", \"CKMBINDEX\", \"TROPONINI\" in the last 72 hours.    U/A:    Lab Results   Component Value Date/Time    COLORU Yellow 06/11/2024 12:00 PM    WBCUA TOO NUMEROUS TO COUNT 06/11/2024 12:00 PM    RBCUA 0 TO 2 06/11/2024 12:00 PM    MUCUS 1+ 02/07/2023 03:00 PM    BACTERIA MANY 06/11/2024 12:00 PM    LEUKOCYTESUR LARGE 06/11/2024 12:00 PM    GLUCOSEU NEGATIVE 06/11/2024 12:00 PM    AMORPHOUS 1+ 02/25/2023 03:15 AM       ABG    Lab Results   Component Value Date/Time

## 2024-08-15 NOTE — PROGRESS NOTES
Physical Therapy        Physical Therapy Cancel Note      DATE: 8/15/2024    NAME: Estefany Garcia  MRN: [id number]   : 1960      Patient not seen this date for Physical Therapy due to:    8- at 954:  HOLD PT evaluation.  CT scan of the lumbar region shows IMPRESSION:   1. Posterior spinal fixation changes with questionable loosening of the right L4 transpedicular screw, though findings may be artifactual due to technique. Correlation with clinical presentation and continued attention on follow-up, as clinically warranted.     2.  Multilevel degenerative changes with at least moderate bilateral foraminal stenosis at L3-L4 and to a lesser extent L2-L3, progressed from CT of 2021. Of note, MRI is more sensitive for the evaluation of spinal canal or foraminal stenosis.     Will await to see if ortho is consulted and clears patient. Case discussed w/ nurse Kaley and charge nurse Edin.      Electronically signed by Karla Rodriguez, PT on 8/15/2024 at 9:54 AM

## 2024-08-16 LAB
ANION GAP SERPL CALCULATED.3IONS-SCNC: 16 MMOL/L (ref 9–17)
BASOPHILS # BLD: 0.08 K/UL (ref 0–0.2)
BASOPHILS NFR BLD: 1 % (ref 0–2)
BUN SERPL-MCNC: 35 MG/DL (ref 8–23)
CALCIUM SERPL-MCNC: 9.1 MG/DL (ref 8.6–10.4)
CHLORIDE SERPL-SCNC: 94 MMOL/L (ref 98–107)
CO2 SERPL-SCNC: 26 MMOL/L (ref 20–31)
CREAT SERPL-MCNC: 5.4 MG/DL (ref 0.5–0.9)
EOSINOPHIL # BLD: 0.16 K/UL (ref 0–0.4)
EOSINOPHILS RELATIVE PERCENT: 2 % (ref 0–4)
ERYTHROCYTE [DISTWIDTH] IN BLOOD BY AUTOMATED COUNT: 19.3 % (ref 11.5–14.9)
GFR, ESTIMATED: 8 ML/MIN/1.73M2
GLUCOSE BLD-MCNC: 145 MG/DL (ref 65–105)
GLUCOSE BLD-MCNC: 151 MG/DL (ref 65–105)
GLUCOSE BLD-MCNC: 159 MG/DL (ref 65–105)
GLUCOSE BLD-MCNC: 64 MG/DL (ref 65–105)
GLUCOSE BLD-MCNC: 64 MG/DL (ref 65–105)
GLUCOSE BLD-MCNC: 99 MG/DL (ref 65–105)
GLUCOSE SERPL-MCNC: 61 MG/DL (ref 70–99)
HCT VFR BLD AUTO: 29.2 % (ref 36–46)
HGB BLD-MCNC: 9.4 G/DL (ref 12–16)
LYMPHOCYTES NFR BLD: 0.87 K/UL (ref 1–4.8)
LYMPHOCYTES RELATIVE PERCENT: 11 % (ref 24–44)
MCH RBC QN AUTO: 38.5 PG (ref 26–34)
MCHC RBC AUTO-ENTMCNC: 32.1 G/DL (ref 31–37)
MCV RBC AUTO: 120.1 FL (ref 80–100)
MONOCYTES NFR BLD: 0.63 K/UL (ref 0.1–1.3)
MONOCYTES NFR BLD: 8 % (ref 1–7)
MORPHOLOGY: ABNORMAL
NEUTROPHILS NFR BLD: 78 % (ref 36–66)
NEUTS SEG NFR BLD: 6.16 K/UL (ref 1.3–9.1)
PLATELET # BLD AUTO: 170 K/UL (ref 150–450)
PMV BLD AUTO: 8.8 FL (ref 6–12)
POTASSIUM SERPL-SCNC: 4.5 MMOL/L (ref 3.7–5.3)
RBC # BLD AUTO: 2.43 M/UL (ref 4–5.2)
SODIUM SERPL-SCNC: 136 MMOL/L (ref 135–144)
WBC OTHER # BLD: 7.9 K/UL (ref 3.5–11)

## 2024-08-16 PROCEDURE — 99239 HOSP IP/OBS DSCHRG MGMT >30: CPT | Performed by: FAMILY MEDICINE

## 2024-08-16 PROCEDURE — 97530 THERAPEUTIC ACTIVITIES: CPT

## 2024-08-16 PROCEDURE — 1200000000 HC SEMI PRIVATE

## 2024-08-16 PROCEDURE — 6360000002 HC RX W HCPCS: Performed by: FAMILY MEDICINE

## 2024-08-16 PROCEDURE — 97162 PT EVAL MOD COMPLEX 30 MIN: CPT

## 2024-08-16 PROCEDURE — 6370000000 HC RX 637 (ALT 250 FOR IP): Performed by: FAMILY MEDICINE

## 2024-08-16 PROCEDURE — 2580000003 HC RX 258: Performed by: FAMILY MEDICINE

## 2024-08-16 PROCEDURE — 85025 COMPLETE CBC W/AUTO DIFF WBC: CPT

## 2024-08-16 PROCEDURE — 90935 HEMODIALYSIS ONE EVALUATION: CPT

## 2024-08-16 PROCEDURE — 97535 SELF CARE MNGMENT TRAINING: CPT

## 2024-08-16 PROCEDURE — 99233 SBSQ HOSP IP/OBS HIGH 50: CPT | Performed by: SURGERY

## 2024-08-16 PROCEDURE — 80048 BASIC METABOLIC PNL TOTAL CA: CPT

## 2024-08-16 PROCEDURE — 36415 COLL VENOUS BLD VENIPUNCTURE: CPT

## 2024-08-16 PROCEDURE — 97166 OT EVAL MOD COMPLEX 45 MIN: CPT

## 2024-08-16 PROCEDURE — 82947 ASSAY GLUCOSE BLOOD QUANT: CPT

## 2024-08-16 RX ORDER — HYDROCODONE BITARTRATE AND ACETAMINOPHEN 5; 325 MG/1; MG/1
1 TABLET ORAL EVERY 4 HOURS PRN
Qty: 42 TABLET | Refills: 0 | Status: SHIPPED | OUTPATIENT
Start: 2024-08-16 | End: 2024-08-16

## 2024-08-16 RX ORDER — LIDOCAINE 4 G/G
1 PATCH TOPICAL DAILY
Qty: 7 EACH | Refills: 0 | Status: ON HOLD
Start: 2024-08-16 | End: 2024-08-29 | Stop reason: HOSPADM

## 2024-08-16 RX ORDER — FENTANYL CITRATE 0.05 MG/ML
25 INJECTION, SOLUTION INTRAMUSCULAR; INTRAVENOUS
Status: DISCONTINUED | OUTPATIENT
Start: 2024-08-16 | End: 2024-08-20 | Stop reason: HOSPADM

## 2024-08-16 RX ORDER — LIDOCAINE 4 G/G
1 PATCH TOPICAL DAILY
Status: DISCONTINUED | OUTPATIENT
Start: 2024-08-16 | End: 2024-08-20 | Stop reason: HOSPADM

## 2024-08-16 RX ORDER — HYDROCODONE BITARTRATE AND ACETAMINOPHEN 5; 325 MG/1; MG/1
1 TABLET ORAL EVERY 4 HOURS PRN
Qty: 42 TABLET | Refills: 0 | Status: ON HOLD | OUTPATIENT
Start: 2024-08-16 | End: 2024-08-23 | Stop reason: HOSPADM

## 2024-08-16 RX ADMIN — FENTANYL CITRATE 25 MCG: 0.05 INJECTION, SOLUTION INTRAMUSCULAR; INTRAVENOUS at 06:50

## 2024-08-16 RX ADMIN — SODIUM CHLORIDE, PRESERVATIVE FREE 10 ML: 5 INJECTION INTRAVENOUS at 08:46

## 2024-08-16 RX ADMIN — SEVELAMER CARBONATE 1600 MG: 800 TABLET, FILM COATED ORAL at 13:52

## 2024-08-16 RX ADMIN — HEPARIN SODIUM 5000 UNITS: 5000 INJECTION INTRAVENOUS; SUBCUTANEOUS at 18:01

## 2024-08-16 RX ADMIN — SODIUM CHLORIDE, PRESERVATIVE FREE 10 ML: 5 INJECTION INTRAVENOUS at 22:36

## 2024-08-16 RX ADMIN — HYDROCODONE BITARTRATE AND ACETAMINOPHEN 1 TABLET: 5; 325 TABLET ORAL at 23:43

## 2024-08-16 RX ADMIN — INSULIN GLARGINE 42 UNITS: 100 INJECTION, SOLUTION SUBCUTANEOUS at 22:27

## 2024-08-16 RX ADMIN — SODIUM BICARBONATE 1300 MG: 650 TABLET ORAL at 08:31

## 2024-08-16 RX ADMIN — POTASSIUM CHLORIDE 10 MEQ: 750 CAPSULE, EXTENDED RELEASE ORAL at 22:21

## 2024-08-16 RX ADMIN — ATORVASTATIN CALCIUM 80 MG: 80 TABLET, FILM COATED ORAL at 22:21

## 2024-08-16 RX ADMIN — BUSPIRONE HYDROCHLORIDE 10 MG: 10 TABLET ORAL at 22:22

## 2024-08-16 RX ADMIN — PRASUGREL 10 MG: 10 TABLET, FILM COATED ORAL at 08:31

## 2024-08-16 RX ADMIN — INSULIN GLARGINE 72 UNITS: 100 INJECTION, SOLUTION SUBCUTANEOUS at 08:46

## 2024-08-16 RX ADMIN — CALCIUM POLYCARBOPHIL 1250 MG: 625 TABLET ORAL at 22:35

## 2024-08-16 RX ADMIN — VENLAFAXINE HYDROCHLORIDE 75 MG: 75 CAPSULE, EXTENDED RELEASE ORAL at 08:31

## 2024-08-16 RX ADMIN — POTASSIUM CHLORIDE 10 MEQ: 750 CAPSULE, EXTENDED RELEASE ORAL at 08:31

## 2024-08-16 RX ADMIN — DOCUSATE SODIUM 100 MG: 100 CAPSULE, LIQUID FILLED ORAL at 08:31

## 2024-08-16 RX ADMIN — ALLOPURINOL 100 MG: 100 TABLET ORAL at 08:31

## 2024-08-16 RX ADMIN — INSULIN LISPRO 20 UNITS: 100 INJECTION, SOLUTION INTRAVENOUS; SUBCUTANEOUS at 08:45

## 2024-08-16 RX ADMIN — FERROUS SULFATE TAB 325 MG (65 MG ELEMENTAL FE) 325 MG: 325 (65 FE) TAB at 09:13

## 2024-08-16 RX ADMIN — HEPARIN SODIUM 5000 UNITS: 5000 INJECTION INTRAVENOUS; SUBCUTANEOUS at 22:22

## 2024-08-16 RX ADMIN — BUSPIRONE HYDROCHLORIDE 10 MG: 10 TABLET ORAL at 13:52

## 2024-08-16 RX ADMIN — Medication 9 MG: at 23:44

## 2024-08-16 RX ADMIN — SEVELAMER CARBONATE 1600 MG: 800 TABLET, FILM COATED ORAL at 08:31

## 2024-08-16 RX ADMIN — FENTANYL CITRATE 25 MCG: 0.05 INJECTION, SOLUTION INTRAMUSCULAR; INTRAVENOUS at 08:44

## 2024-08-16 RX ADMIN — Medication 1 MG: at 06:50

## 2024-08-16 RX ADMIN — FENTANYL CITRATE 25 MCG: 0.05 INJECTION, SOLUTION INTRAMUSCULAR; INTRAVENOUS at 12:04

## 2024-08-16 RX ADMIN — HYDROCODONE BITARTRATE AND ACETAMINOPHEN 1 TABLET: 5; 325 TABLET ORAL at 13:52

## 2024-08-16 RX ADMIN — SEVELAMER CARBONATE 1600 MG: 800 TABLET, FILM COATED ORAL at 18:02

## 2024-08-16 RX ADMIN — ASPIRIN 81 MG 81 MG: 81 TABLET ORAL at 08:32

## 2024-08-16 RX ADMIN — HEPARIN SODIUM 5000 UNITS: 5000 INJECTION INTRAVENOUS; SUBCUTANEOUS at 06:17

## 2024-08-16 RX ADMIN — BUSPIRONE HYDROCHLORIDE 10 MG: 10 TABLET ORAL at 08:32

## 2024-08-16 RX ADMIN — DOCUSATE SODIUM 100 MG: 100 CAPSULE, LIQUID FILLED ORAL at 22:21

## 2024-08-16 RX ADMIN — SODIUM BICARBONATE 1300 MG: 650 TABLET ORAL at 22:21

## 2024-08-16 RX ADMIN — CALCIUM POLYCARBOPHIL 1250 MG: 625 TABLET ORAL at 08:31

## 2024-08-16 RX ADMIN — Medication 400 MG: at 22:21

## 2024-08-16 ASSESSMENT — PAIN SCALES - GENERAL
PAINLEVEL_OUTOF10: 8
PAINLEVEL_OUTOF10: 10
PAINLEVEL_OUTOF10: 8
PAINLEVEL_OUTOF10: 8
PAINLEVEL_OUTOF10: 10
PAINLEVEL_OUTOF10: 8

## 2024-08-16 ASSESSMENT — PAIN DESCRIPTION - DESCRIPTORS
DESCRIPTORS: ACHING
DESCRIPTORS: STABBING
DESCRIPTORS: ACHING
DESCRIPTORS: THROBBING
DESCRIPTORS: ACHING;STABBING
DESCRIPTORS: TINGLING;NUMBNESS;ACHING

## 2024-08-16 ASSESSMENT — PAIN DESCRIPTION - LOCATION
LOCATION: BACK
LOCATION: FOOT;HAND;LEG
LOCATION: BACK
LOCATION: BACK

## 2024-08-16 ASSESSMENT — PAIN DESCRIPTION - ORIENTATION: ORIENTATION: RIGHT;MID

## 2024-08-16 NOTE — PROGRESS NOTES
Spoke with dialysis staff. Staff says after 1352 norco administration and lidocaine patch placement, patient has had no c/o pain. Dialysis staff states patient slept through the rest of dialysis\ treatment.

## 2024-08-16 NOTE — CARE COORDINATION
Writer is following for potential discharge placement. Referral sent to [facility] at [facility]. Per RN JENNY Castellanos, PT/OT has been requested as STAT evaluation for this pt.  Electronically signed by AZ Saez on 8/16/2024 at 9:44 AM

## 2024-08-16 NOTE — PROGRESS NOTES
Department of Internal Medicine  Nephrology Cherri Landis MD Progress Note    Reason for consultation: Management of hemodialysis dependent End-stage renal disease.    Consulting physician: Madonna Aviles MD.      Interval history: Patient was seen and examined today and she is feeling better with improvement in back pain.  She is scheduled to receive acute hemodialysis today.    History of present illness: This is a 64 y.o. female with a significant past medical history of Systemic hypertension, osteoarthritis,  coronary artery disease [s/p CABG with subsequent graft stent], s/p cerebrovascular accident, type 2 diabetes mellitus and End-stage renal disease secondary to hypertensive and diabetic nephropathy [on routine hemodialysis Monday/Wednesday/Friday at [facility] dialysis unit [facility] urinary care of Dr. Graham using left arm AV fistula], who presented to the emergency department at [facility]  8/024 with complaints of right-sided mid back pain.  She has a history of spine surgery in 2001 and had been to the [facility] ED on 8/13/2024 with CT scan of the thoracic and lumbar spine suggesting possible small hardware fracture and she was scheduled to follow-up with her spine surgeon, Dr. Pederson on 8/15/2024    Blood pressure at presentation was 90/61 mmHg. Laboratory studies at presentation remarkable for BUN/creatinine 37/5.8 mg/dL.  She did receive acute hemodialysis yesterday evening after admission.  She feels better today.  Laboratory studies today were reviewed.    Scheduled Meds:   allopurinol  100 mg Oral Daily    aspirin  81 mg Oral Daily    atorvastatin  80 mg Oral Nightly    insulin glargine  72 Units SubCUTAneous QAM    insulin glargine  42 Units SubCUTAneous Nightly    bumetanide  3 mg Oral Once per day on Sunday Tuesday Thursday Saturday    busPIRone  10 mg Oral TID    polycarbophil  1,250 mg Oral BID    carvedilol  3.125 mg Oral BID    docusate sodium  100 mg Oral BID

## 2024-08-16 NOTE — PROGRESS NOTES
08/16/24 1336   Encounter Summary   Encounter Overview/Reason Volunteer Encounter   Service Provided For Patient   Last Encounter  08/16/24   Rituals, Rites and Sacraments   Type Congregational Communion   Assessment/Intervention/Outcome   Intervention Prayer (assurance of)/[place]

## 2024-08-16 NOTE — PLAN OF CARE
Problem: Discharge Planning  Goal: Discharge to home or other facility with appropriate resources  Outcome: Progressing     Problem: Safety - Adult  Goal: Free from fall injury  Outcome: Progressing  Flowsheets (Taken 8/16/2024 1205)  Free From Fall Injury: Based on caregiver fall risk screen, instruct family/caregiver to ask for assistance with transferring infant if caregiver noted to have fall risk factors     Problem: Skin/Tissue Integrity  Goal: Absence of new skin breakdown  Description: 1.  Monitor for areas of redness and/or skin breakdown  2.  Assess vascular access sites hourly  3.  Every 4-6 hours minimum:  Change oxygen saturation probe site  4.  Every 4-6 hours:  If on nasal continuous positive airway pressure, respiratory therapy assess nares and determine need for appliance change or resting period.  Outcome: Progressing     Problem: ABCDS Injury Assessment  Goal: Absence of physical injury  Outcome: Progressing  Flowsheets (Taken 8/16/2024 1205)  Absence of Physical Injury: Implement safety measures based on patient assessment     Problem: Chronic Conditions and Co-morbidities  Goal: Patient's chronic conditions and co-morbidity symptoms are monitored and maintained or improved  Outcome: Progressing

## 2024-08-16 NOTE — PROGRESS NOTES
[facility]  Progress Note                   Date:   8/16/2024  Patient name: Estefany Garcia  Date of admission:  8/14/2024 10:46 AM  MRN:   [id number]  YOB: 1960  PCP: Zulma Payne APRN - CNP    Reason for Admission: ESRD (end stage renal disease) on dialysis (HCC) [N18.6, Z99.2]  Fluid overload [E87.70]  Acute exacerbation of chronic low back pain [M54.50, G89.29]  Hypervolemia, unspecified hypervolemia type [E87.70]    Subjective:       Clinical Changes /Abnormalities:Patient seen and examined. Feeling better , no sob , on RA , currently at rehab at [facility] Denies chest pain . Tele/vitals/labs reviewed .   Review of Systems    Medications:   Scheduled Meds:   allopurinol  100 mg Oral Daily    aspirin  81 mg Oral Daily    atorvastatin  80 mg Oral Nightly    insulin glargine  72 Units SubCUTAneous QAM    insulin glargine  42 Units SubCUTAneous Nightly    bumetanide  3 mg Oral Once per day on Sunday Tuesday Thursday Saturday    busPIRone  10 mg Oral TID    polycarbophil  1,250 mg Oral BID    carvedilol  3.125 mg Oral BID    docusate sodium  100 mg Oral BID    epoetin raphael-epbx  3,000 Units SubCUTAneous Once per day on Monday Wednesday Friday    ferrous sulfate  325 mg Oral Daily    insulin lispro  20 Units SubCUTAneous TID WC    magnesium oxide  400 mg Oral Daily    potassium chloride  10 mEq Oral BID    prasugrel  10 mg Oral Daily    sevelamer  1,600 mg Oral TID WC    sodium bicarbonate  1,300 mg Oral BID    venlafaxine  75 mg Oral Daily with breakfast    sodium chloride flush  5-40 mL IntraVENous 2 times per day    heparin (porcine)  5,000 Units SubCUTAneous 3 times per day    insulin lispro  0-4 Units SubCUTAneous TID WC    insulin lispro  0-4 Units SubCUTAneous Nightly     Continuous Infusions:   dextrose      sodium chloride       CBC:   Recent Labs     08/14/24  1148 08/15/24  0609 08/16/24  0753   WBC 9.3 7.0 7.9   HGB 9.5* 9.5* 9.4*    162 170     BMP:   abnormality. Prolonged QT    Stress ECG: The Stress ECG was negative for ischemia.    Stress Test: Blood pressure demonstrated a normal response and heart rate demonstrated a normal response to stress.     FINDINGS:     [Fixed perfusion defects involve the inferior apical and mid segments, inferolateral and anterolateral walls at stress, 24% myocardium at stress.]  Perfusion scores are visually adjusted to account for artifact.     Summed stress score: [19]  Summed rest score: [19]  Summed reversibility score: [0]     Function:  End diastolic volume: [82]mL  Left ventricular ejection fraction: [66]%  Wall motion abnormalities: [Hypokinesis in the lateral]  TID score: [1.35] (scores greater than 1.39 are considered elevated for Lexiscan stress with Tc99m)     Impression  Perfusion: [Fixed perfusion defects in the lateral segments most compatible with infarct. No significant stalin-infarct ischemia.]  Function: [Hypokinesis lateral wall]  Risk stratification: [High]     CATH 11/3/2023   Severe native vessel CAD.   Patent mid RCA stents with 70% stenosis after the stents.  Patent LIMA-LAD.   Patent SVG-OM with diffuse severe disease in OM and not amenable to PCI, similar to before.   SVG-Diagonal 99% mid stenosis, very fibrotic lesion. Multiple high pressure NC balloons used, guide line and body wire, reduced to 0% using 2.5x15 mm Resolute Oynx, PD using 3 mm NC balloon  Mildly reduced LV systolic function.   LVEDP 7 mm Hg. No gradient across the aortic valve.      Recommendations     DAPT and risk factor modifications.   Staged PCI of RCA in 1-2 wks       CATH Conclusion 2/2024     Severe native vessel CAD.   Patent LIMA-LAD. Patent SVG-Diagonal with patent stent.   RCA 70% mid ISR reduced to 0% using high pressure 3 mm NC balloon PTCA. RPDA has 95% ISR reduced to 0% using high pressure 2.25 NC balloon PTCA.      Recommendations    DAPT and risk factor modifications.     Complications    Complications documented before  study signed (2/28/2024  6:13 PM)     No complications were associated with this study.   Documented by Jairo Hurley MD - 2/28/2024  6:13 PM             Objective:   Vitals: BP (!) 103/49   Pulse 74   Temp 97.7 °F (36.5 °C)   Resp 16   Ht 1.651 m (5' 5\")   Wt 108.9 kg (240 lb)   SpO2 94%   BMI 39.94 kg/m²   General appearance: alert and cooperative with exam  HEENT: Head: Normocephalic, no lesions, without obvious abnormality.  Neck:no JVD, trachea midline, no adenopathy  Lungs: Clear to auscultation  Heart: Regular rate and rhythm, s1/s2 auscultated, no murmurs  Abdomen: soft, non-tender, bowel sounds active  Extremities: no edema  Neurologic: not done        Assessment / Acute Cardiac Problems:   Acute on chronic diastolic CHF  ESRD- on HD  MV CAD s/p CABG- Cath 2/23-patent LIMA to LAD, SVG to diagonal, SVG to OM, safe use disease post anastomosis at the SVG to OM which is known and not amenable to PCI.  Patent proximal RCA stent with new mid severe disease, RPDA had diffuse severe disease that was too small for PCI, she did undergo a PCI with drug-eluting stent to the mid RCA  Anemia    Patient Active Problem List:     Atherosclerosis of coronary artery bypass graft of native heart with angina pectoris (HCC)     Acute kidney injury superimposed on CKD ([facility])     Chronic diastolic heart failure (HCC)     Diabetic polyneuropathy associated with type 2 diabetes mellitus ([facility])     History of coronary artery bypass graft     Iron deficiency anemia     Spinal stenosis of lumbar region with neurogenic claudication     Mixed hyperlipidemia     CKD (chronic kidney disease) stage 4, GFR 15-29 ml/min ([facility])     Type 2 diabetes 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, with long-term current use of insulin ([facility])     Obesity, Class II, BMI 35-39.9     Thyroid nodule greater than or equal to 1 cm in diameter incidentally noted on imaging study     Essential hypertension     Anemia of chronic disease

## 2024-08-16 NOTE — PROGRESS NOTES
HEMODIALYSIS POST TREATMENT NOTE    Treatment time ordered: 210    Actual treatment time: 210    UltraFiltration Goal: 3000  UltraFiltration Removed: 3500      Pre Treatment weight: 120  Post Treatment weight: 117  Estimated Dry Weight: 116    Access used:     Central Venous Catheter:          Tunneled or Non-tunneled: na           Site: na          Access Flow: na      Internal Access:       AV Fistula or AV Graft: AVF          Site: ROBIN       Access Flow: 350       Sign and symptoms of infection: na       If YES: na    Medications or blood products given: see MAR    Chronic outpatient schedule: [facility]    Chronic outpatient unit: [facility]    Summary of response to treatment: Pt tx d/c all blood returned, dilayzer streaked, 3500 removed, .3000 after prime/flush     Explain if orders NOT met, was physician notified:trinh      ACES flowsheet faxed to patient unit/ placed in patient chart: yes    Post assessment completed: yes    Report given to: Tonny Gilmore Rn      * Intra-treatment documented Safety Checks include the followin) Access and face visible at all times.     2) All connections and blood lines are secure with no kinks.     3) NVL alarm engaged.     4) Hemosafe device applied (if applicable).     5) No collapse of Arterial or Venous blood chambers.     6) All blood lines / pump segments in the air detectors.

## 2024-08-16 NOTE — PROGRESS NOTES
Facility/Department: [facility] SURG  Physical Therapy Initial Assessment    Name: Estefany Garcia  : 1960  MRN: [id number]  Date of Service: 2024    Discharge Recommendations:  Patient would benefit from continued therapy after discharge, Therapy recommended at discharge   PT Equipment Recommendations  Equipment Needed: No      Patient Diagnosis(es): The primary encounter diagnosis was Hypervolemia, unspecified hypervolemia type. Diagnoses of ESRD (end stage renal disease) on dialysis ([facility]) and Acute exacerbation of chronic low back pain were also pertinent to this visit.  Past Medical History:  has a past medical history of Arthritis, Backache, unspecified, CAD (coronary artery disease), Cerebral artery occlusion with cerebral infarction ([facility]), CHF (congestive heart failure) ([facility]), Chronic kidney disease, Coronary atherosclerosis of artery bypass graft, COVID, Cramp of limb, Epistaxis, Gallstones, Hemodialysis patient ([facility]), Hyperlipidemia, Hypertension, Insomnia, Neuromuscular disorder ([facility]), Pneumonia, Psychiatric problem, Thyroid disease, Type II or unspecified type diabetes mellitus with renal manifestations, not stated as uncontrolled(250.40), Type II or unspecified type diabetes mellitus without mention of complication, not stated as uncontrolled, and Unspecified vitamin D deficiency.  Past Surgical History:  has a past surgical history that includes Coronary artery bypass graft (2005); Knee arthroscopy; Carpal tunnel release; Breast surgery; Tonsillectomy; Hand surgery; Ankle fracture surgery; Cholecystectomy, open (N/A); IR TUNNELED CVC PLACE WO SQ PORT/PUMP > 5 YEARS (2021); AV fistula creation (2021); Dialysis fistula creation (Left, 2021); other surgical history (2022); back surgery; Colonoscopy; eye surgery; fracture surgery; Cardiac surgery; IR TUNNELED CVC PLACE WO SQ PORT/PUMP > 5 YEARS (2023); IR TUNNELED CVC PLACE WO SQ PORT/PUMP > 5 YEARS (Right, 2023);  Dialysis Catheter Insertion (Right, 04/13/2023); other surgical history (Left, 04/18/2023); Coronary angioplasty with stent (02/14/2023); Cardiac catheterization (2005); Coronary angioplasty with stent (2019); Dialysis fistula creation (Left, 04/19/2023); IR TUNNELED CVC PLACE WO SQ PORT/PUMP > 5 YEARS (06/07/2023); IR TUNNELED CVC PLACE WO SQ PORT/PUMP > 5 YEARS (06/08/2023); Coronary angioplasty with stent (11/03/2023); Cardiac procedure (N/A, 11/03/2023); Percutaneous Transluminal Coronary Angio (11/15/2023); Cardiac procedure (N/A, 11/15/2023); vascular surgery (Left, 02/08/2024); vascular surgery (Left, 02/08/2024); Cardiac procedure (N/A, 02/28/2024); Cardiac procedure (N/A, 02/28/2024); Breast reduction surgery; Fistulagram (Left, 4/25/2024); and Fistulagram (Left, 7/1/2024).    Assessment   Body Structures, Functions, Activity Limitations Requiring Skilled Therapeutic Intervention: Decreased functional mobility ;Decreased endurance;Decreased balance  Assessment: Impaired mobility due to decreased tolerance to activity and safety concerns of decreased balance  Decision Making: Medium Complexity  History: Pulmonary Congestion  Exam: decreased balance, endurance, mobility  Clinical Presentation: evolving  Requires PT Follow-Up: Yes  Activity Tolerance  Activity Tolerance: Patient tolerated evaluation without incident;Patient limited by endurance  Activity Tolerance Comments: Significant fatigue after standing for several minutes for pericare     Plan   Physical Therapy Plan  General Plan: 5-7 times per week  Current Treatment Recommendations: Balance training, Gait training, Functional mobility training, Transfer training, Endurance training, Therapeutic activities  Safety Devices  Type of Devices: Nurse notified, Call light within reach, Patient at risk for falls, Left in chair     Restrictions  Restrictions/Precautions  Restrictions/Precautions: Contact Precautions, Fall Risk, General Precautions  Required

## 2024-08-16 NOTE — PROGRESS NOTES
Progress Note  Date:2024       Room:/-01  Patient Name:Estefany Garcia     YOB: 1960     Age:64 y.o.        Subjective    Subjective:  Symptoms:  Resolved.  (No shortness of breath, but states \"I need my shot - how am I going to get my shot?\" In reference to the IV pain medication she is getting for her back. ).    Diet:  Adequate intake.    Activity level: Returning to normal.    Pain:  She complains of pain that is severe.       Review of Systems  Objective         Vitals Last 24 Hours:  TEMPERATURE:  Temp  Av.8 °F (36.6 °C)  Min: 97.2 °F (36.2 °C)  Max: 98.3 °F (36.8 °C)  RESPIRATIONS RANGE: Resp  Av.9  Min: 16  Max: 19  PULSE OXIMETRY RANGE: SpO2  Av.7 %  Min: 96 %  Max: 98 %  PULSE RANGE: Pulse  Av.8  Min: 72  Max: 85  BLOOD PRESSURE RANGE: Systolic (24hrs), Av , Min:100 , Max:130   ; Diastolic (24hrs), Av, Min:48, Max:59    I/O (24Hr):  No intake or output data in the 24 hours ending 24 0519  Objective:  General Appearance:  Comfortable.    Vital signs: (most recent): Blood pressure (!) 103/49, pulse 74, temperature 97.7 °F (36.5 °C), resp. rate 16, height 1.651 m (5' 5\"), weight 108.9 kg (240 lb), SpO2 94%.  Vital signs are normal.      Labs/Imaging/Diagnostics    Labs:  CBC:  Recent Labs     24  1148 08/15/24  0609   WBC 9.3 7.0   RBC 2.50* 2.44*   HGB 9.5* 9.5*   HCT 29.5* 29.4*   .3* 120.5*   RDW 20.0* 19.7*    162     CHEMISTRIES:  Recent Labs     24  1148 08/15/24  0609    137   K 4.5 4.5   CL 95* 94*   CO2 25 26   BUN 37* 24*   CREATININE 5.8* 4.1*   GLUCOSE 179* 116*   MG 2.8*  --      PT/INR:  Recent Labs     24  1148   PROTIME 16.3*   INR 1.3     APTT:No results for input(s): \"APTT\" in the last 72 hours.  LIVER PROFILE:  Recent Labs     24  1148   AST 42*   ALT 24   BILITOT 0.8   ALKPHOS 187*       Imaging Last 24 Hours:  XR CHEST PORTABLE    Result Date: 2024  EXAMINATION: ONE XRAY VIEW OF

## 2024-08-16 NOTE — PROGRESS NOTES
[facility]   Occupational Therapy Evaluation  Date: 24  Patient Name: Estefany Garcia       Room: -  MRN: [id number]  Account: [id number]   : 1960  (64 y.o.) Gender: female     Discharge Recommendations:  Further Occupational Therapy is recommended upon facility discharge.    OT Equipment Recommendations  Other: TBD    Referring Practitioner: Dr. Aviles  Diagnosis: ESRD; Fluid overload; Acute exacerbation of chronis LBP     Treatment Diagnosis: Impaired self-care status    Past Medical History:  has a past medical history of Arthritis, Backache, unspecified, CAD (coronary artery disease), Cerebral artery occlusion with cerebral infarction ([facility]), CHF (congestive heart failure) ([facility]), Chronic kidney disease, Coronary atherosclerosis of artery bypass graft, COVID, Cramp of limb, Epistaxis, Gallstones, Hemodialysis patient ([facility]), Hyperlipidemia, Hypertension, Insomnia, Neuromuscular disorder ([facility]), Pneumonia, Psychiatric problem, Thyroid disease, Type II or unspecified type diabetes mellitus with renal manifestations, not stated as uncontrolled(250.40), Type II or unspecified type diabetes mellitus without mention of complication, not stated as uncontrolled, and Unspecified vitamin D deficiency.    Past Surgical History:   has a past surgical history that includes Coronary artery bypass graft (2005); Knee arthroscopy; Carpal tunnel release; Breast surgery; Tonsillectomy; Hand surgery; Ankle fracture surgery; Cholecystectomy, open (N/A); IR TUNNELED CVC PLACE WO SQ PORT/PUMP > 5 YEARS (2021); AV fistula creation (2021); Dialysis fistula creation (Left, 2021); other surgical history (2022); back surgery; Colonoscopy; eye surgery; fracture surgery; Cardiac surgery; IR TUNNELED CVC PLACE WO SQ PORT/PUMP > 5 YEARS (2023); IR TUNNELED CVC PLACE WO SQ PORT/PUMP > 5 YEARS (Right, 2023); Dialysis Catheter Insertion (Right, 2023); other

## 2024-08-16 NOTE — PROGRESS NOTES
HEMODIALYSIS PRE-TREATMENT NOTE    Patient Identifiers prior to treatment:  MRN NAME    Isolation Required: yes                      Isolation Type: contact       (please document if patient is being managed as a PUI/COVID-19 patient)        Hepatitis status:                           Date Drawn                             Result  Hepatitis B Surface Antigen 24 neg        Hepatitis B Surface Antibody 3/18/24 1000        Hepatitis B Core Antibody 3/18/24 negf          How was Hepatitis Status verified: epic     Was a copy of the labs you documented provided to facility for the patient's chart: yes    Hemodialysis orders verified: yes    Access Within normal limits ( I.e. s/s of infection,...): yes     Pre-Assessment completed: yes    Pre-dialysis report received from: Tonny Gilmore Rn                 Time: 9530

## 2024-08-16 NOTE — CARE COORDINATION
Writer is following for potential discharge placement to [facility]. Notation is available to start authorization for this pt.    Writer placed call to [facility] centralized admissions to request authorization started. No answer, voicemail left for return call.    Writer met with pt for update, spoke to pt sister Hailey via speakerphone, all questions answered at this time.   Electronically signed by AZ Saez on 8/16/2024 at 11:57 AM    Dar spoke to Yodit with [facility]. Facility will start authorization for this pt.  Electronically signed by AZ Saez on 8/16/2024 at 5:05 PM

## 2024-08-16 NOTE — PROGRESS NOTES
Physician Progress Note      PATIENT:               SANDRA LAUREANO  CSN #:                  [id number]  :                       1960  ADMIT DATE:       2024 10:46 AM  DISCH DATE:  RESPONDING  PROVIDER #:        Madonna Aviles MD          QUERY TEXT:    Pt admitted with pulmonary congestion and has CHF documented. 8/15 H&P states   \"Pulmonary congestion likely related to chronic diastolic CHF and ESRD\", \"some   shortness of breath\", and CXR Cardiomegaly with pulmonary vascular   congestion. 8/15 cardiology note states \"Fluid overload\" and \"Acute on chronic   diastolic CHF\". If possible, please document in progress notes and discharge   summary further specificity regarding the type and acuity of CHF:    The medical record reflects the following:  Risk Factors: 65yo, CHF, ESRD, diabetes  Clinical Indicators: 8/15 H&P states \"Pulmonary congestion likely related to   chronic diastolic CHF and ESRD\", \"some shortness of breath\", and CXR   Cardiomegaly with pulmonary vascular congestion. 8/15 cardiology note states   \"Fluid overload\" and \"Acute on chronic diastolic CHF\".  Treatment: labs, imaging, PO bumex, dialysis, cardiology consult, nephrology   consult, I&O, daily weights, tele    Thank you,  Oneyda Avalos (Klaus), RN BSN  Clinical   Options provided:  -- Acute on Chronic Diastolic CHF/HFpEF  -- Chronic Diastolic CHF/HFpEF  -- Other - I will add my own diagnosis  -- Disagree - Not applicable / Not valid  -- Disagree - Clinically unable to determine / Unknown  -- Refer to Clinical Documentation Reviewer    PROVIDER RESPONSE TEXT:    This patient is in acute on chronic diastolic CHF/HFpEF.    Query created by: Oneyda Mccord on 8/15/2024 1:37 PM      Electronically signed by:  Madonna Aviles MD 2024 8:33 AM

## 2024-08-16 NOTE — PROGRESS NOTES
Patient screaming and hollering stating that she's in agonizing pain and that she needs IV pain medication. Writer educated on alternating oral and IV medication as well as pain relief and non pharmacological interventions to relieve her pain and she still insisted IV medication. Attending notified. Lidocaine 4% patch ordered and PM&R consult complete.

## 2024-08-17 LAB
ANION GAP SERPL CALCULATED.3IONS-SCNC: 13 MMOL/L (ref 9–17)
ANTI-XA UNFRAC HEPARIN: 0.16 IU/L (ref 0.3–0.7)
ANTI-XA UNFRAC HEPARIN: <0.1 IU/L (ref 0.3–0.7)
BASOPHILS # BLD: 0.07 K/UL (ref 0–0.2)
BASOPHILS NFR BLD: 1 % (ref 0–2)
BUN SERPL-MCNC: 22 MG/DL (ref 8–23)
CALCIUM SERPL-MCNC: 9.5 MG/DL (ref 8.6–10.4)
CHLORIDE SERPL-SCNC: 94 MMOL/L (ref 98–107)
CO2 SERPL-SCNC: 28 MMOL/L (ref 20–31)
CREAT SERPL-MCNC: 4 MG/DL (ref 0.5–0.9)
EOSINOPHIL # BLD: 0.13 K/UL (ref 0–0.4)
EOSINOPHILS RELATIVE PERCENT: 2 % (ref 0–4)
ERYTHROCYTE [DISTWIDTH] IN BLOOD BY AUTOMATED COUNT: 19.5 % (ref 11.5–14.9)
ERYTHROCYTE [DISTWIDTH] IN BLOOD BY AUTOMATED COUNT: 19.6 % (ref 11.5–14.9)
GFR, ESTIMATED: 12 ML/MIN/1.73M2
GLUCOSE BLD-MCNC: 125 MG/DL (ref 65–105)
GLUCOSE BLD-MCNC: 133 MG/DL (ref 65–105)
GLUCOSE BLD-MCNC: 139 MG/DL (ref 65–105)
GLUCOSE BLD-MCNC: 170 MG/DL (ref 65–105)
GLUCOSE BLD-MCNC: 213 MG/DL (ref 65–105)
GLUCOSE BLD-MCNC: 54 MG/DL (ref 65–105)
GLUCOSE BLD-MCNC: 57 MG/DL (ref 65–105)
GLUCOSE BLD-MCNC: 76 MG/DL (ref 65–105)
GLUCOSE SERPL-MCNC: 136 MG/DL (ref 70–99)
HCT VFR BLD AUTO: 30 % (ref 36–46)
HCT VFR BLD AUTO: 30.8 % (ref 36–46)
HGB BLD-MCNC: 10.1 G/DL (ref 12–16)
HGB BLD-MCNC: 9.6 G/DL (ref 12–16)
INR PPP: 1.2
LYMPHOCYTES NFR BLD: 1.06 K/UL (ref 1–4.8)
LYMPHOCYTES RELATIVE PERCENT: 16 % (ref 24–44)
MCH RBC QN AUTO: 38 PG (ref 26–34)
MCH RBC QN AUTO: 38.7 PG (ref 26–34)
MCHC RBC AUTO-ENTMCNC: 32.1 G/DL (ref 31–37)
MCHC RBC AUTO-ENTMCNC: 32.9 G/DL (ref 31–37)
MCV RBC AUTO: 117.6 FL (ref 80–100)
MCV RBC AUTO: 118.3 FL (ref 80–100)
MONOCYTES NFR BLD: 0.59 K/UL (ref 0.1–1.3)
MONOCYTES NFR BLD: 9 % (ref 1–7)
MORPHOLOGY: ABNORMAL
NEUTROPHILS NFR BLD: 72 % (ref 36–66)
NEUTS SEG NFR BLD: 4.75 K/UL (ref 1.3–9.1)
PARTIAL THROMBOPLASTIN TIME: 27.1 SEC (ref 24–36)
PLATELET # BLD AUTO: 203 K/UL (ref 150–450)
PLATELET # BLD AUTO: 211 K/UL (ref 150–450)
PMV BLD AUTO: 8.7 FL (ref 6–12)
PMV BLD AUTO: 8.9 FL (ref 6–12)
POTASSIUM SERPL-SCNC: 4 MMOL/L (ref 3.7–5.3)
PROTHROMBIN TIME: 15.2 SEC (ref 11.8–14.6)
RBC # BLD AUTO: 2.53 M/UL (ref 4–5.2)
RBC # BLD AUTO: 2.62 M/UL (ref 4–5.2)
SODIUM SERPL-SCNC: 135 MMOL/L (ref 135–144)
TROPONIN I SERPL HS-MCNC: 303 NG/L (ref 0–14)
TROPONIN I SERPL HS-MCNC: 305 NG/L (ref 0–14)
WBC OTHER # BLD: 6.3 K/UL (ref 3.5–11)
WBC OTHER # BLD: 6.6 K/UL (ref 3.5–11)

## 2024-08-17 PROCEDURE — 85610 PROTHROMBIN TIME: CPT

## 2024-08-17 PROCEDURE — 84484 ASSAY OF TROPONIN QUANT: CPT

## 2024-08-17 PROCEDURE — 93005 ELECTROCARDIOGRAM TRACING: CPT | Performed by: INTERNAL MEDICINE

## 2024-08-17 PROCEDURE — 6360000002 HC RX W HCPCS: Performed by: INTERNAL MEDICINE

## 2024-08-17 PROCEDURE — 6370000000 HC RX 637 (ALT 250 FOR IP): Performed by: SURGERY

## 2024-08-17 PROCEDURE — 85730 THROMBOPLASTIN TIME PARTIAL: CPT

## 2024-08-17 PROCEDURE — 85025 COMPLETE CBC W/AUTO DIFF WBC: CPT

## 2024-08-17 PROCEDURE — 2580000003 HC RX 258: Performed by: FAMILY MEDICINE

## 2024-08-17 PROCEDURE — 36415 COLL VENOUS BLD VENIPUNCTURE: CPT

## 2024-08-17 PROCEDURE — 6370000000 HC RX 637 (ALT 250 FOR IP): Performed by: FAMILY MEDICINE

## 2024-08-17 PROCEDURE — 99233 SBSQ HOSP IP/OBS HIGH 50: CPT | Performed by: INTERNAL MEDICINE

## 2024-08-17 PROCEDURE — 80048 BASIC METABOLIC PNL TOTAL CA: CPT

## 2024-08-17 PROCEDURE — 6360000002 HC RX W HCPCS: Performed by: FAMILY MEDICINE

## 2024-08-17 PROCEDURE — 85520 HEPARIN ASSAY: CPT

## 2024-08-17 PROCEDURE — 2060000000 HC ICU INTERMEDIATE R&B

## 2024-08-17 PROCEDURE — 85027 COMPLETE CBC AUTOMATED: CPT

## 2024-08-17 PROCEDURE — 99233 SBSQ HOSP IP/OBS HIGH 50: CPT | Performed by: FAMILY MEDICINE

## 2024-08-17 RX ORDER — HEPARIN SODIUM 1000 [USP'U]/ML
4000 INJECTION, SOLUTION INTRAVENOUS; SUBCUTANEOUS ONCE
Status: COMPLETED | OUTPATIENT
Start: 2024-08-17 | End: 2024-08-17

## 2024-08-17 RX ORDER — HEPARIN SODIUM 1000 [USP'U]/ML
4000 INJECTION, SOLUTION INTRAVENOUS; SUBCUTANEOUS PRN
Status: DISCONTINUED | OUTPATIENT
Start: 2024-08-17 | End: 2024-08-19

## 2024-08-17 RX ORDER — HEPARIN SODIUM 10000 [USP'U]/100ML
5-30 INJECTION, SOLUTION INTRAVENOUS CONTINUOUS
Status: DISCONTINUED | OUTPATIENT
Start: 2024-08-17 | End: 2024-08-19

## 2024-08-17 RX ORDER — HEPARIN SODIUM 1000 [USP'U]/ML
2000 INJECTION, SOLUTION INTRAVENOUS; SUBCUTANEOUS PRN
Status: DISCONTINUED | OUTPATIENT
Start: 2024-08-17 | End: 2024-08-19

## 2024-08-17 RX ADMIN — NITROGLYCERIN 0.4 MG: 0.4 TABLET, ORALLY DISINTEGRATING SUBLINGUAL at 07:46

## 2024-08-17 RX ADMIN — BUSPIRONE HYDROCHLORIDE 10 MG: 10 TABLET ORAL at 07:48

## 2024-08-17 RX ADMIN — FENTANYL CITRATE 25 MCG: 0.05 INJECTION, SOLUTION INTRAMUSCULAR; INTRAVENOUS at 14:13

## 2024-08-17 RX ADMIN — SODIUM BICARBONATE 1300 MG: 650 TABLET ORAL at 07:48

## 2024-08-17 RX ADMIN — BUSPIRONE HYDROCHLORIDE 10 MG: 10 TABLET ORAL at 21:27

## 2024-08-17 RX ADMIN — POTASSIUM CHLORIDE 10 MEQ: 750 CAPSULE, EXTENDED RELEASE ORAL at 07:48

## 2024-08-17 RX ADMIN — Medication 9 MG: at 23:06

## 2024-08-17 RX ADMIN — SODIUM CHLORIDE, PRESERVATIVE FREE 10 ML: 5 INJECTION INTRAVENOUS at 08:00

## 2024-08-17 RX ADMIN — INSULIN GLARGINE 72 UNITS: 100 INJECTION, SOLUTION SUBCUTANEOUS at 07:49

## 2024-08-17 RX ADMIN — SODIUM BICARBONATE 1300 MG: 650 TABLET ORAL at 21:27

## 2024-08-17 RX ADMIN — INSULIN LISPRO 20 UNITS: 100 INJECTION, SOLUTION INTRAVENOUS; SUBCUTANEOUS at 17:23

## 2024-08-17 RX ADMIN — PRASUGREL 10 MG: 10 TABLET, FILM COATED ORAL at 07:56

## 2024-08-17 RX ADMIN — CALCIUM POLYCARBOPHIL 1250 MG: 625 TABLET ORAL at 07:56

## 2024-08-17 RX ADMIN — NITROGLYCERIN 0.4 MG: 0.4 TABLET, ORALLY DISINTEGRATING SUBLINGUAL at 08:02

## 2024-08-17 RX ADMIN — HEPARIN SODIUM 5000 UNITS: 5000 INJECTION INTRAVENOUS; SUBCUTANEOUS at 06:22

## 2024-08-17 RX ADMIN — DOCUSATE SODIUM 100 MG: 100 CAPSULE, LIQUID FILLED ORAL at 07:48

## 2024-08-17 RX ADMIN — HEPARIN SODIUM 4000 UNITS: 1000 INJECTION INTRAVENOUS; SUBCUTANEOUS at 12:51

## 2024-08-17 RX ADMIN — CARVEDILOL 3.12 MG: 3.12 TABLET, FILM COATED ORAL at 21:27

## 2024-08-17 RX ADMIN — CARVEDILOL 3.12 MG: 3.12 TABLET, FILM COATED ORAL at 07:48

## 2024-08-17 RX ADMIN — VENLAFAXINE HYDROCHLORIDE 75 MG: 75 CAPSULE, EXTENDED RELEASE ORAL at 07:48

## 2024-08-17 RX ADMIN — BUMETANIDE 3 MG: 1 TABLET ORAL at 07:48

## 2024-08-17 RX ADMIN — POTASSIUM CHLORIDE 10 MEQ: 750 CAPSULE, EXTENDED RELEASE ORAL at 21:27

## 2024-08-17 RX ADMIN — HEPARIN SODIUM 2000 UNITS: 1000 INJECTION INTRAVENOUS; SUBCUTANEOUS at 19:17

## 2024-08-17 RX ADMIN — HYDROCODONE BITARTRATE AND ACETAMINOPHEN 1 TABLET: 5; 325 TABLET ORAL at 07:48

## 2024-08-17 RX ADMIN — FENTANYL CITRATE 25 MCG: 0.05 INJECTION, SOLUTION INTRAMUSCULAR; INTRAVENOUS at 21:26

## 2024-08-17 RX ADMIN — ALLOPURINOL 100 MG: 100 TABLET ORAL at 07:48

## 2024-08-17 RX ADMIN — HYDROCODONE BITARTRATE AND ACETAMINOPHEN 1 TABLET: 5; 325 TABLET ORAL at 13:17

## 2024-08-17 RX ADMIN — INSULIN LISPRO 20 UNITS: 100 INJECTION, SOLUTION INTRAVENOUS; SUBCUTANEOUS at 10:27

## 2024-08-17 RX ADMIN — SEVELAMER CARBONATE 1600 MG: 800 TABLET, FILM COATED ORAL at 07:48

## 2024-08-17 RX ADMIN — SEVELAMER CARBONATE 1600 MG: 800 TABLET, FILM COATED ORAL at 17:10

## 2024-08-17 RX ADMIN — FENTANYL CITRATE 25 MCG: 0.05 INJECTION, SOLUTION INTRAMUSCULAR; INTRAVENOUS at 17:32

## 2024-08-17 RX ADMIN — BUSPIRONE HYDROCHLORIDE 10 MG: 10 TABLET ORAL at 13:32

## 2024-08-17 RX ADMIN — FENTANYL CITRATE 25 MCG: 0.05 INJECTION, SOLUTION INTRAMUSCULAR; INTRAVENOUS at 02:41

## 2024-08-17 RX ADMIN — SEVELAMER CARBONATE 1600 MG: 800 TABLET, FILM COATED ORAL at 13:32

## 2024-08-17 RX ADMIN — NITROGLYCERIN 0.4 MG: 0.4 TABLET, ORALLY DISINTEGRATING SUBLINGUAL at 08:21

## 2024-08-17 RX ADMIN — FERROUS SULFATE TAB 325 MG (65 MG ELEMENTAL FE) 325 MG: 325 (65 FE) TAB at 07:48

## 2024-08-17 RX ADMIN — HEPARIN SODIUM 8 UNITS/KG/HR: 10000 INJECTION, SOLUTION INTRAVENOUS at 12:41

## 2024-08-17 RX ADMIN — ASPIRIN 81 MG 81 MG: 81 TABLET ORAL at 07:48

## 2024-08-17 RX ADMIN — CYCLOBENZAPRINE 5 MG: 10 TABLET, FILM COATED ORAL at 16:24

## 2024-08-17 RX ADMIN — ATORVASTATIN CALCIUM 80 MG: 80 TABLET, FILM COATED ORAL at 21:27

## 2024-08-17 RX ADMIN — HYDROCODONE BITARTRATE AND ACETAMINOPHEN 1 TABLET: 5; 325 TABLET ORAL at 23:11

## 2024-08-17 ASSESSMENT — PAIN DESCRIPTION - LOCATION
LOCATION: BACK
LOCATION: CHEST
LOCATION: BACK
LOCATION: CHEST
LOCATION: BACK
LOCATION: CHEST
LOCATION: BACK

## 2024-08-17 ASSESSMENT — PAIN SCALES - GENERAL
PAINLEVEL_OUTOF10: 6
PAINLEVEL_OUTOF10: 9
PAINLEVEL_OUTOF10: 10
PAINLEVEL_OUTOF10: 8
PAINLEVEL_OUTOF10: 10
PAINLEVEL_OUTOF10: 7
PAINLEVEL_OUTOF10: 1
PAINLEVEL_OUTOF10: 7
PAINLEVEL_OUTOF10: 8
PAINLEVEL_OUTOF10: 8
PAINLEVEL_OUTOF10: 9
PAINLEVEL_OUTOF10: 10
PAINLEVEL_OUTOF10: 2

## 2024-08-17 ASSESSMENT — PAIN DESCRIPTION - ORIENTATION
ORIENTATION: RIGHT
ORIENTATION: MID
ORIENTATION: RIGHT
ORIENTATION: RIGHT
ORIENTATION: MID
ORIENTATION: MID
ORIENTATION: RIGHT;LOWER
ORIENTATION: LOWER
ORIENTATION: MID

## 2024-08-17 ASSESSMENT — PAIN DESCRIPTION - DESCRIPTORS
DESCRIPTORS: ACHING;SHARP
DESCRIPTORS: ACHING;SHOOTING
DESCRIPTORS: STABBING
DESCRIPTORS: SHOOTING;ACHING
DESCRIPTORS: SHARP
DESCRIPTORS: SHARP
DESCRIPTORS: ACHING;SHARP
DESCRIPTORS: SHARP
DESCRIPTORS: SHARP;SPASM
DESCRIPTORS: SHARP

## 2024-08-17 ASSESSMENT — PAIN - FUNCTIONAL ASSESSMENT
PAIN_FUNCTIONAL_ASSESSMENT: PREVENTS OR INTERFERES SOME ACTIVE ACTIVITIES AND ADLS
PAIN_FUNCTIONAL_ASSESSMENT: PREVENTS OR INTERFERES SOME ACTIVE ACTIVITIES AND ADLS
PAIN_FUNCTIONAL_ASSESSMENT: PREVENTS OR INTERFERES WITH ALL ACTIVE AND SOME PASSIVE ACTIVITIES

## 2024-08-17 NOTE — PROGRESS NOTES
Progress Note  Date:2024       Room:-01  Patient Name:Estefany Garcia     YOB: 1960     Age:64 y.o.        Subjective    Subjective:  Symptoms:  Resolved.  (No shortness of breath, she did complain of chest pain earlier this am that had improved by the time I rounded with 3 NTG tablets. Cardiology ordered troponins that came back 303 & 305, up from 200's. Patient stating that she wants to be transferred to [facility] because she wants her back fixed and her pain is uncontrolled on Fentanyl & Norco.).    Diet:  Adequate intake.    Activity level: Returning to normal (worse with twisting or bending).    Pain:  She complains of pain that is severe.       Review of Systems  Objective         Vitals Last 24 Hours:  TEMPERATURE:  Temp  Av °F (36.7 °C)  Min: 97.5 °F (36.4 °C)  Max: 98.2 °F (36.8 °C)  RESPIRATIONS RANGE: Resp  Av.5  Min: 16  Max: 18  PULSE OXIMETRY RANGE: SpO2  Av %  Min: 90 %  Max: 98 %  PULSE RANGE: Pulse  Av.9  Min: 67  Max: 80  BLOOD PRESSURE RANGE: Systolic (24hrs), Av , Min:91 , Max:150   ; Diastolic (24hrs), Av, Min:32, Max:71    I/O (24Hr):    Intake/Output Summary (Last 24 hours) at 2024 0712  Last data filed at 2024 1623  Gross per 24 hour   Intake 300 ml   Output 3500 ml   Net -3200 ml     Objective:  General Appearance:  Comfortable.    Vital signs: (most recent): Blood pressure (!) 128/52, pulse 76, temperature 98.2 °F (36.8 °C), resp. rate 17, height 1.651 m (5' 5\"), weight 117 kg (257 lb 15 oz), SpO2 97%.  Vital signs are normal.      Labs/Imaging/Diagnostics    Labs:  CBC:  Recent Labs     08/15/24  0609 24  0753 24  0632   WBC 7.0 7.9 6.6   RBC 2.44* 2.43* 2.53*   HGB 9.5* 9.4* 9.6*   HCT 29.4* 29.2* 30.0*   .5* 120.1* 118.3*   RDW 19.7* 19.3* 19.6*    170 203     CHEMISTRIES:  Recent Labs     24  1148 08/15/24  0609 24  0633 24  0632    137 136 135   K 4.5 4.5 4.5 4.0  Transfer denied as Dr. Barrios does not round at [facility], she is not a candidate for urgent surgery and [facility] is on an extended wait currently. Will discharge on the same pain medication she was admitted on hopefully back to [place] - awaiting pre-cert; f/u with surgery as was planned before admission to evaluate potentially damaged back hardware.    Time spent in pre-visit planning, interview, exam, /education and coordination of care: 60 minutes.      Electronically signed by Niesha Sunshine MD on 8/17/2024 at 7:12 AM

## 2024-08-17 NOTE — PROGRESS NOTES
Attempted to get patient transferred to [facility] per patient request for assessment of loose screws from previous surgery with consent from Dr. ISAURO Katz and Dr. YRN Sunshine.  Dr. Sunshine called back with update from Dr. Garcia who is covering for Dr. Nichols.  Patient will not be accepted for transfer due to back issue not being emergent at this time.

## 2024-08-17 NOTE — PROGRESS NOTES
RN called and initiated transfer to [facility] for continuity of care so that the patient can see her Neurosurgeon and her back can be evaluated.

## 2024-08-17 NOTE — PROGRESS NOTES
[facility]  Progress Note                   Date:   8/17/2024  Patient name: Estefany Garcia  Date of admission:  8/14/2024 10:46 AM  MRN:   [id number]  YOB: 1960  PCP: Zulma Payne APRN - CNP    Reason for Admission: ESRD (end stage renal disease) on dialysis (HCC) [N18.6, Z99.2]  Fluid overload [E87.70]  Acute exacerbation of chronic low back pain [M54.50, G89.29]  Hypervolemia, unspecified hypervolemia type [E87.70]    Subjective:       Clinical Changes /Abnormalities:  Seen and examined  Complaining of lots of back pain.  Denies any chest pains, shortness of breath orthopnea, PND, lower extremity edema.        Medications:   Scheduled Meds:   lidocaine  1 patch TransDERmal Daily    allopurinol  100 mg Oral Daily    aspirin  81 mg Oral Daily    atorvastatin  80 mg Oral Nightly    insulin glargine  72 Units SubCUTAneous QAM    insulin glargine  42 Units SubCUTAneous Nightly    bumetanide  3 mg Oral Once per day on Sunday Tuesday Thursday Saturday    busPIRone  10 mg Oral TID    polycarbophil  1,250 mg Oral BID    carvedilol  3.125 mg Oral BID    docusate sodium  100 mg Oral BID    epoetin raphael-epbx  3,000 Units SubCUTAneous Once per day on Monday Wednesday Friday    ferrous sulfate  325 mg Oral Daily    insulin lispro  20 Units SubCUTAneous TID WC    magnesium oxide  400 mg Oral Daily    potassium chloride  10 mEq Oral BID    prasugrel  10 mg Oral Daily    sevelamer  1,600 mg Oral TID WC    sodium bicarbonate  1,300 mg Oral BID    venlafaxine  75 mg Oral Daily with breakfast    sodium chloride flush  5-40 mL IntraVENous 2 times per day    heparin (porcine)  5,000 Units SubCUTAneous 3 times per day    insulin lispro  0-4 Units SubCUTAneous TID WC    insulin lispro  0-4 Units SubCUTAneous Nightly     Continuous Infusions:   dextrose      sodium chloride       CBC:   Recent Labs     08/15/24  0609 08/16/24  0753 08/17/24  0632   WBC 7.0 7.9 6.6   HGB 9.5* 9.4* 9.6*    170 203

## 2024-08-17 NOTE — PROGRESS NOTES
Patient c/o chest pain. Administered nitro x3. Patient states pain went from a 10 to a 5 and is currently in bed, resting, eyes closed, On call cardiologist ordered trop and EKG. EKG complete. Troponin 300. MD notified.

## 2024-08-17 NOTE — CARE COORDINATION
ONGOING DISCHARGE PLANNING NOTE:    Writer reviewed LSW notes, and discharge plan is to DC to [facility].     Per Notes, Auth started 8/16/24. Still waiting.     Will continue to follow for additional discharge needs.    Electronically signed by Kaley Dee RN on 8/17/2024 at 10:51 AM

## 2024-08-17 NOTE — PROGRESS NOTES
Department of Internal Medicine  Nephrology Cherri Landis MD Progress Note    Reason for consultation: Management of hemodialysis dependent End-stage renal disease.    Consulting physician: Madonna Aviles MD.      Interval history: Patient was seen and examined today and she was moaning due to acute exacerbation of back pain.  She was also complaining of chest pain which improved with nitroglycerin.    History of present illness: This is a 64 y.o. female with a significant past medical history of Systemic hypertension, osteoarthritis,  coronary artery disease [s/p CABG with subsequent graft stent], s/p cerebrovascular accident, type 2 diabetes mellitus and End-stage renal disease secondary to hypertensive and diabetic nephropathy [on routine hemodialysis Monday/Wednesday/Friday at [facility] dialysis unit [facility] urinary care of Dr. Graham using left arm AV fistula], who presented to the emergency department at [facility]  8/024 with complaints of right-sided mid back pain.  She has a history of spine surgery in 2001 and had been to the [facility] ED on 8/13/2024 with CT scan of the thoracic and lumbar spine suggesting possible small hardware fracture and she was scheduled to follow-up with her spine surgeon, Dr. Pederson on 8/15/2024    Blood pressure at presentation was 90/61 mmHg. Laboratory studies at presentation remarkable for BUN/creatinine 37/5.8 mg/dL.  She did receive acute hemodialysis yesterday evening after admission.  She feels better today.  Laboratory studies today were reviewed.    Scheduled Meds:   lidocaine  1 patch TransDERmal Daily    allopurinol  100 mg Oral Daily    aspirin  81 mg Oral Daily    atorvastatin  80 mg Oral Nightly    insulin glargine  72 Units SubCUTAneous QAM    insulin glargine  42 Units SubCUTAneous Nightly    bumetanide  3 mg Oral Once per day on Sunday Tuesday Thursday Saturday    busPIRone  10 mg Oral TID    polycarbophil  1,250 mg Oral BID

## 2024-08-17 NOTE — PROGRESS NOTES
Patient and sister requesting transfer to [facility].  Complaining of pain that is not being managed and no ortho doc seeing her.

## 2024-08-17 NOTE — PROGRESS NOTES
Writer attempted to call patient's sister Hailey as requested by the patient. No answer, left voicemail.

## 2024-08-17 NOTE — PLAN OF CARE
Problem: Discharge Planning  Goal: Discharge to home or other facility with appropriate resources  Outcome: Progressing  Flowsheets (Taken 8/17/2024 0354)  Discharge to home or other facility with appropriate resources:   Identify barriers to discharge with patient and caregiver   Arrange for needed discharge resources and transportation as appropriate   Identify discharge learning needs (meds, wound care, etc)   Arrange for interpreters to assist at discharge as needed   Refer to discharge planning if patient needs post-hospital services based on physician order or complex needs related to functional status, cognitive ability or social support system     Problem: Safety - Adult  Goal: Free from fall injury  Outcome: Progressing  Flowsheets (Taken 8/17/2024 0354)  Free From Fall Injury: Instruct family/caregiver on patient safety     Problem: Skin/Tissue Integrity  Goal: Absence of new skin breakdown  Description: 1.  Monitor for areas of redness and/or skin breakdown  2.  Assess vascular access sites hourly  3.  Every 4-6 hours minimum:  Change oxygen saturation probe site  4.  Every 4-6 hours:  If on nasal continuous positive airway pressure, respiratory therapy assess nares and determine need for appliance change or resting period.  Outcome: Progressing  Note: Monitoring skin     Problem: ABCDS Injury Assessment  Goal: Absence of physical injury  Outcome: Progressing  Flowsheets (Taken 8/17/2024 0354)  Absence of Physical Injury: Implement safety measures based on patient assessment     Problem: Chronic Conditions and Co-morbidities  Goal: Patient's chronic conditions and co-morbidity symptoms are monitored and maintained or improved  Outcome: Progressing  Flowsheets (Taken 8/17/2024 0354)  Care Plan - Patient's Chronic Conditions and Co-Morbidity Symptoms are Monitored and Maintained or Improved:   Monitor and assess patient's chronic conditions and comorbid symptoms for stability, deterioration, or  improvement   Collaborate with multidisciplinary team to address chronic and comorbid conditions and prevent exacerbation or deterioration   Update acute care plan with appropriate goals if chronic or comorbid symptoms are exacerbated and prevent overall improvement and discharge     Problem: Pain  Goal: Verbalizes/displays adequate comfort level or baseline comfort level  Outcome: Progressing  Flowsheets (Taken 8/17/2024 2411)  Verbalizes/displays adequate comfort level or baseline comfort level:   Encourage patient to monitor pain and request assistance   Assess pain using appropriate pain scale   Administer analgesics based on type and severity of pain and evaluate response   Implement non-pharmacological measures as appropriate and evaluate response   Consider cultural and social influences on pain and pain management   Notify Licensed Independent Practitioner if interventions unsuccessful or patient reports new pain

## 2024-08-17 NOTE — PROGRESS NOTES
Pharmacy monitoring of Heparin infusion  Heparin Infusion New Order    Patient on heparin for:  MI    Was patient on previous oral anticoagulant/dose?:  no , Confirmed with RN/Pt/Pts family/Surescripts?: yes    Factor Xa inhibitor/LMWH use within the past 72 hours? NO      Recent Labs     08/14/24  1148 08/15/24  0609 08/16/24  0753 08/17/24  0632   HGB 9.5* 9.5* 9.4* 9.6*   INR 1.3  --   --   --     162 170 203       Heparin to be monitored using anti-Xa for duration of therapy.(aPTT should be used for patients who have received a DOAC in the past 72 hours)?      Have admin instructions been updated to reflect appropriate protocol? YES    Have appropriate labs been ordered for corresponding protocol? YES   *Discontinue anti-Xa lab monitoring if using aPTT protocol    Is the starting rate/last rate adjustment appropriate? yes    Concurrent anticoagulants: n/a  (Note it is not appropriate to be on most anticoagulants while on a heparin drip)    Review platelets from the past few days.  Any concerns?: No    Staica Boyce PharmD, [facility] 8/17/2024 11:23 AM

## 2024-08-17 NOTE — PROGRESS NOTES
Report given to Kyrie in progressive care. All questions answered and acknowledged. Patient will be transported to PCU by wheelchair..

## 2024-08-18 LAB
ANION GAP SERPL CALCULATED.3IONS-SCNC: 14 MMOL/L (ref 9–17)
ANTI-XA UNFRAC HEPARIN: 0.25 IU/L (ref 0.3–0.7)
ANTI-XA UNFRAC HEPARIN: 0.48 IU/L (ref 0.3–0.7)
ANTI-XA UNFRAC HEPARIN: 0.52 IU/L (ref 0.3–0.7)
BASOPHILS # BLD: 0 K/UL (ref 0–0.2)
BASOPHILS NFR BLD: 0 % (ref 0–2)
BUN SERPL-MCNC: 32 MG/DL (ref 8–23)
CALCIUM SERPL-MCNC: 8.9 MG/DL (ref 8.6–10.4)
CHLORIDE SERPL-SCNC: 90 MMOL/L (ref 98–107)
CO2 SERPL-SCNC: 25 MMOL/L (ref 20–31)
CREAT SERPL-MCNC: 5.1 MG/DL (ref 0.5–0.9)
EOSINOPHIL # BLD: 0.26 K/UL (ref 0–0.4)
EOSINOPHILS RELATIVE PERCENT: 4 % (ref 0–4)
ERYTHROCYTE [DISTWIDTH] IN BLOOD BY AUTOMATED COUNT: 19.6 % (ref 11.5–14.9)
GFR, ESTIMATED: 9 ML/MIN/1.73M2
GLUCOSE BLD-MCNC: 126 MG/DL (ref 65–105)
GLUCOSE BLD-MCNC: 237 MG/DL (ref 65–105)
GLUCOSE BLD-MCNC: 311 MG/DL (ref 65–105)
GLUCOSE BLD-MCNC: 338 MG/DL (ref 65–105)
GLUCOSE BLD-MCNC: 95 MG/DL (ref 65–105)
GLUCOSE SERPL-MCNC: 135 MG/DL (ref 70–99)
HCT VFR BLD AUTO: 30 % (ref 36–46)
HGB BLD-MCNC: 9.6 G/DL (ref 12–16)
LYMPHOCYTES NFR BLD: 1.09 K/UL (ref 1–4.8)
LYMPHOCYTES RELATIVE PERCENT: 17 % (ref 24–44)
MCH RBC QN AUTO: 38.3 PG (ref 26–34)
MCHC RBC AUTO-ENTMCNC: 32 G/DL (ref 31–37)
MCV RBC AUTO: 119.7 FL (ref 80–100)
MONOCYTES NFR BLD: 0.13 K/UL (ref 0.1–1.3)
MONOCYTES NFR BLD: 2 % (ref 1–7)
MORPHOLOGY: ABNORMAL
NEUTROPHILS NFR BLD: 77 % (ref 36–66)
NEUTS SEG NFR BLD: 4.96 K/UL (ref 1.3–9.1)
NUCLEATED RED BLOOD CELLS: 1 PER 100 WBC
PLATELET # BLD AUTO: 178 K/UL (ref 150–450)
PMV BLD AUTO: 8.8 FL (ref 6–12)
POTASSIUM SERPL-SCNC: 4 MMOL/L (ref 3.7–5.3)
RBC # BLD AUTO: 2.51 M/UL (ref 4–5.2)
SODIUM SERPL-SCNC: 129 MMOL/L (ref 135–144)
TROPONIN I SERPL HS-MCNC: 267 NG/L (ref 0–14)
WBC OTHER # BLD: 6.4 K/UL (ref 3.5–11)

## 2024-08-18 PROCEDURE — 6370000000 HC RX 637 (ALT 250 FOR IP): Performed by: SURGERY

## 2024-08-18 PROCEDURE — 2060000000 HC ICU INTERMEDIATE R&B

## 2024-08-18 PROCEDURE — 82947 ASSAY GLUCOSE BLOOD QUANT: CPT

## 2024-08-18 PROCEDURE — 84484 ASSAY OF TROPONIN QUANT: CPT

## 2024-08-18 PROCEDURE — 6370000000 HC RX 637 (ALT 250 FOR IP): Performed by: FAMILY MEDICINE

## 2024-08-18 PROCEDURE — 6360000002 HC RX W HCPCS: Performed by: INTERNAL MEDICINE

## 2024-08-18 PROCEDURE — 80048 BASIC METABOLIC PNL TOTAL CA: CPT

## 2024-08-18 PROCEDURE — 36415 COLL VENOUS BLD VENIPUNCTURE: CPT

## 2024-08-18 PROCEDURE — 6360000002 HC RX W HCPCS: Performed by: FAMILY MEDICINE

## 2024-08-18 PROCEDURE — 99233 SBSQ HOSP IP/OBS HIGH 50: CPT | Performed by: INTERNAL MEDICINE

## 2024-08-18 PROCEDURE — 99232 SBSQ HOSP IP/OBS MODERATE 35: CPT | Performed by: FAMILY MEDICINE

## 2024-08-18 PROCEDURE — 85520 HEPARIN ASSAY: CPT

## 2024-08-18 PROCEDURE — 85025 COMPLETE CBC W/AUTO DIFF WBC: CPT

## 2024-08-18 RX ORDER — ALLOPURINOL 100 MG/1
100 TABLET ORAL
Status: DISCONTINUED | OUTPATIENT
Start: 2024-08-19 | End: 2024-08-20 | Stop reason: HOSPADM

## 2024-08-18 RX ORDER — LOPERAMIDE HCL 2 MG
2 CAPSULE ORAL ONCE
Status: COMPLETED | OUTPATIENT
Start: 2024-08-18 | End: 2024-08-18

## 2024-08-18 RX ORDER — HYDROCODONE BITARTRATE AND ACETAMINOPHEN 5; 325 MG/1; MG/1
1 TABLET ORAL EVERY 4 HOURS PRN
Status: DISCONTINUED | OUTPATIENT
Start: 2024-08-18 | End: 2024-08-20 | Stop reason: HOSPADM

## 2024-08-18 RX ADMIN — BUSPIRONE HYDROCHLORIDE 10 MG: 10 TABLET ORAL at 20:21

## 2024-08-18 RX ADMIN — FENTANYL CITRATE 25 MCG: 0.05 INJECTION, SOLUTION INTRAMUSCULAR; INTRAVENOUS at 18:42

## 2024-08-18 RX ADMIN — SEVELAMER CARBONATE 1600 MG: 800 TABLET, FILM COATED ORAL at 12:01

## 2024-08-18 RX ADMIN — FENTANYL CITRATE 25 MCG: 0.05 INJECTION, SOLUTION INTRAMUSCULAR; INTRAVENOUS at 09:08

## 2024-08-18 RX ADMIN — INSULIN GLARGINE 72 UNITS: 100 INJECTION, SOLUTION SUBCUTANEOUS at 09:48

## 2024-08-18 RX ADMIN — SEVELAMER CARBONATE 1600 MG: 800 TABLET, FILM COATED ORAL at 18:33

## 2024-08-18 RX ADMIN — POTASSIUM CHLORIDE 10 MEQ: 750 CAPSULE, EXTENDED RELEASE ORAL at 09:47

## 2024-08-18 RX ADMIN — ALLOPURINOL 100 MG: 100 TABLET ORAL at 09:47

## 2024-08-18 RX ADMIN — PRASUGREL 10 MG: 10 TABLET, FILM COATED ORAL at 12:01

## 2024-08-18 RX ADMIN — CYCLOBENZAPRINE 5 MG: 10 TABLET, FILM COATED ORAL at 14:14

## 2024-08-18 RX ADMIN — SODIUM BICARBONATE 1300 MG: 650 TABLET ORAL at 20:21

## 2024-08-18 RX ADMIN — SEVELAMER CARBONATE 1600 MG: 800 TABLET, FILM COATED ORAL at 09:47

## 2024-08-18 RX ADMIN — INSULIN LISPRO 20 UNITS: 100 INJECTION, SOLUTION INTRAVENOUS; SUBCUTANEOUS at 12:01

## 2024-08-18 RX ADMIN — HEPARIN SODIUM 12 UNITS/KG/HR: 10000 INJECTION, SOLUTION INTRAVENOUS at 01:38

## 2024-08-18 RX ADMIN — BUMETANIDE 3 MG: 1 TABLET ORAL at 09:47

## 2024-08-18 RX ADMIN — POTASSIUM CHLORIDE 10 MEQ: 750 CAPSULE, EXTENDED RELEASE ORAL at 20:42

## 2024-08-18 RX ADMIN — INSULIN LISPRO 3 UNITS: 100 INJECTION, SOLUTION INTRAVENOUS; SUBCUTANEOUS at 12:01

## 2024-08-18 RX ADMIN — INSULIN LISPRO 20 UNITS: 100 INJECTION, SOLUTION INTRAVENOUS; SUBCUTANEOUS at 18:32

## 2024-08-18 RX ADMIN — INSULIN GLARGINE 42 UNITS: 100 INJECTION, SOLUTION SUBCUTANEOUS at 20:24

## 2024-08-18 RX ADMIN — HYDROCODONE BITARTRATE AND ACETAMINOPHEN 1 TABLET: 5; 325 TABLET ORAL at 10:09

## 2024-08-18 RX ADMIN — BUSPIRONE HYDROCHLORIDE 10 MG: 10 TABLET ORAL at 09:47

## 2024-08-18 RX ADMIN — HYDROCODONE BITARTRATE AND ACETAMINOPHEN 1 TABLET: 5; 325 TABLET ORAL at 14:14

## 2024-08-18 RX ADMIN — INSULIN LISPRO 20 UNITS: 100 INJECTION, SOLUTION INTRAVENOUS; SUBCUTANEOUS at 09:48

## 2024-08-18 RX ADMIN — CYCLOBENZAPRINE 5 MG: 10 TABLET, FILM COATED ORAL at 05:46

## 2024-08-18 RX ADMIN — ATORVASTATIN CALCIUM 80 MG: 80 TABLET, FILM COATED ORAL at 20:21

## 2024-08-18 RX ADMIN — INSULIN LISPRO 3 UNITS: 100 INJECTION, SOLUTION INTRAVENOUS; SUBCUTANEOUS at 18:34

## 2024-08-18 RX ADMIN — HEPARIN SODIUM 12 UNITS/KG/HR: 10000 INJECTION, SOLUTION INTRAVENOUS at 07:32

## 2024-08-18 RX ADMIN — LOPERAMIDE HYDROCHLORIDE 2 MG: 2 CAPSULE ORAL at 06:31

## 2024-08-18 RX ADMIN — ASPIRIN 81 MG 81 MG: 81 TABLET ORAL at 09:47

## 2024-08-18 RX ADMIN — CARVEDILOL 3.12 MG: 3.12 TABLET, FILM COATED ORAL at 09:47

## 2024-08-18 RX ADMIN — CALCIUM POLYCARBOPHIL 1250 MG: 625 TABLET ORAL at 12:01

## 2024-08-18 RX ADMIN — FERROUS SULFATE TAB 325 MG (65 MG ELEMENTAL FE) 325 MG: 325 (65 FE) TAB at 09:47

## 2024-08-18 RX ADMIN — FENTANYL CITRATE 25 MCG: 0.05 INJECTION, SOLUTION INTRAMUSCULAR; INTRAVENOUS at 02:35

## 2024-08-18 RX ADMIN — DOCUSATE SODIUM 100 MG: 100 CAPSULE, LIQUID FILLED ORAL at 09:47

## 2024-08-18 RX ADMIN — HEPARIN SODIUM 2000 UNITS: 1000 INJECTION INTRAVENOUS; SUBCUTANEOUS at 01:38

## 2024-08-18 RX ADMIN — Medication 400 MG: at 20:21

## 2024-08-18 RX ADMIN — FENTANYL CITRATE 25 MCG: 0.05 INJECTION, SOLUTION INTRAMUSCULAR; INTRAVENOUS at 12:35

## 2024-08-18 RX ADMIN — HYDROCODONE BITARTRATE AND ACETAMINOPHEN 1 TABLET: 5; 325 TABLET ORAL at 05:48

## 2024-08-18 RX ADMIN — FENTANYL CITRATE 25 MCG: 0.05 INJECTION, SOLUTION INTRAMUSCULAR; INTRAVENOUS at 06:03

## 2024-08-18 RX ADMIN — BUSPIRONE HYDROCHLORIDE 10 MG: 10 TABLET ORAL at 14:14

## 2024-08-18 RX ADMIN — VENLAFAXINE HYDROCHLORIDE 75 MG: 75 CAPSULE, EXTENDED RELEASE ORAL at 09:47

## 2024-08-18 RX ADMIN — SODIUM BICARBONATE 1300 MG: 650 TABLET ORAL at 09:47

## 2024-08-18 RX ADMIN — CALCIUM POLYCARBOPHIL 1250 MG: 625 TABLET ORAL at 20:28

## 2024-08-18 ASSESSMENT — PAIN SCALES - GENERAL
PAINLEVEL_OUTOF10: 10
PAINLEVEL_OUTOF10: 8
PAINLEVEL_OUTOF10: 1
PAINLEVEL_OUTOF10: 10
PAINLEVEL_OUTOF10: 8
PAINLEVEL_OUTOF10: 10
PAINLEVEL_OUTOF10: 1
PAINLEVEL_OUTOF10: 0
PAINLEVEL_OUTOF10: 1
PAINLEVEL_OUTOF10: 8
PAINLEVEL_OUTOF10: 10
PAINLEVEL_OUTOF10: 8
PAINLEVEL_OUTOF10: 8
PAINLEVEL_OUTOF10: 0

## 2024-08-18 ASSESSMENT — PAIN DESCRIPTION - LOCATION
LOCATION: BACK

## 2024-08-18 ASSESSMENT — PAIN DESCRIPTION - DESCRIPTORS
DESCRIPTORS: STABBING
DESCRIPTORS: ACHING

## 2024-08-18 ASSESSMENT — PAIN DESCRIPTION - ORIENTATION: ORIENTATION: MID

## 2024-08-18 NOTE — PROGRESS NOTES
Department of Internal Medicine  Nephrology Cherri Landis MD Progress Note    Reason for consultation: Management of hemodialysis dependent End-stage renal disease.    Consulting physician: Madonna Aviles MD.      Interval history: Patient was seen and examined today and she was moaning due to acute exacerbation of back pain. Pain comes and spasm and patient appears to be in severe agony.  Troponins' were checked yesterday and have trended up slightly and patient has been transferred to progressive care unit and started on heparin drip.    History of present illness: This is a 64 y.o. female with a significant past medical history of Systemic hypertension, osteoarthritis,  coronary artery disease [s/p CABG with subsequent graft stent], s/p cerebrovascular accident, type 2 diabetes mellitus and End-stage renal disease secondary to hypertensive and diabetic nephropathy [on routine hemodialysis Monday/Wednesday/Friday at [facility] dialysis unit [facility] urinary care of Dr. Graham using left arm AV fistula], who presented to the emergency department at [facility]  8/024 with complaints of right-sided mid back pain.  She has a history of spine surgery in 2001 and had been to the [facility] ED on 8/13/2024 with CT scan of the thoracic and lumbar spine suggesting possible small hardware fracture and she was scheduled to follow-up with her spine surgeon, Dr. Pederson on 8/15/2024    Blood pressure at presentation was 90/61 mmHg. Laboratory studies at presentation remarkable for BUN/creatinine 37/5.8 mg/dL.  She did receive acute hemodialysis yesterday evening after admission.  She feels better today.  Laboratory studies today were reviewed.    Scheduled Meds:   lidocaine  1 patch TransDERmal Daily    allopurinol  100 mg Oral Daily    aspirin  81 mg Oral Daily    atorvastatin  80 mg Oral Nightly    insulin glargine  72 Units SubCUTAneous QAM    insulin glargine  42 Units SubCUTAneous Nightly

## 2024-08-18 NOTE — PROGRESS NOTES
[facility]  Progress Note                   Date:   8/18/2024  Patient name: Estefany Garcia  Date of admission:  8/14/2024 10:46 AM  MRN:   [id number]  YOB: 1960  PCP: Zulma Payne APRN - CNP    Reason for Admission: ESRD (end stage renal disease) on dialysis (HCC) [N18.6, Z99.2]  Fluid overload [E87.70]  Acute exacerbation of chronic low back pain [M54.50, G89.29]  Hypervolemia, unspecified hypervolemia type [E87.70]    Subjective:       Clinical Changes /Abnormalities:  Seen and examined  Complaining of lots of back pain.  Yesterday had CP  Trops higher than baseline  Today no cp.   On heparin drip    Medications:   Scheduled Meds:   lidocaine  1 patch TransDERmal Daily    allopurinol  100 mg Oral Daily    aspirin  81 mg Oral Daily    atorvastatin  80 mg Oral Nightly    insulin glargine  72 Units SubCUTAneous QAM    insulin glargine  42 Units SubCUTAneous Nightly    bumetanide  3 mg Oral Once per day on Sunday Tuesday Thursday Saturday    busPIRone  10 mg Oral TID    polycarbophil  1,250 mg Oral BID    carvedilol  3.125 mg Oral BID    docusate sodium  100 mg Oral BID    epoetin raphael-epbx  3,000 Units SubCUTAneous Once per day on Monday Wednesday Friday    ferrous sulfate  325 mg Oral Daily    insulin lispro  20 Units SubCUTAneous TID WC    magnesium oxide  400 mg Oral Daily    potassium chloride  10 mEq Oral BID    prasugrel  10 mg Oral Daily    sevelamer  1,600 mg Oral TID WC    sodium bicarbonate  1,300 mg Oral BID    venlafaxine  75 mg Oral Daily with breakfast    sodium chloride flush  5-40 mL IntraVENous 2 times per day    insulin lispro  0-4 Units SubCUTAneous TID WC    insulin lispro  0-4 Units SubCUTAneous Nightly     Continuous Infusions:   heparin (PORCINE) Infusion 12 Units/kg/hr (08/18/24 0732)    dextrose      sodium chloride       CBC:   Recent Labs     08/16/24  0753 08/17/24  0632 08/17/24  1138   WBC 7.9 6.6 6.3   HGB 9.4* 9.6* 10.1*    203 211     BMP:     Recent Labs     08/16/24  0633 08/17/24  0632    135   K 4.5 4.0   CL 94* 94*   CO2 26 28   BUN 35* 22   CREATININE 5.4* 4.0*   GLUCOSE 61* 136*     Hepatic:  No results for input(s): \"AST\", \"ALT\", \"BILITOT\", \"ALKPHOS\" in the last 72 hours.    Invalid input(s): \"ALB\"    Troponin:   Recent Labs     08/17/24  0800 08/17/24  0925   TROPHS 303* 305*     BNP: No results for input(s): \"BNP\" in the last 72 hours.  Lipids: No results for input(s): \"CHOL\", \"HDL\" in the last 72 hours.    Invalid input(s): \"LDLCALCU\"  INR:   Recent Labs     08/17/24  1138   INR 1.2       DATA:    Diagnostics:    EKG: NSR, low voltage QRS, ST-T wave abnormalities- lateral ischemia     TTE 02/02/24   Left Ventricle: Normal left ventricular systolic function with a visually estimated EF of 55 - 60%. Left ventricle size is normal. Mild septal thickening. Normal wall motion. Abnormal diastolic function. Average E/e' ratio is 17.97.    Aorta: Normal sized ascending aorta. Dilated aortic root. Ao root diameter is 2.9 cm.    Image quality is fair.    Echo 1/27/23:  Summary  Global left ventricular systolic function is normal.  Estimated LVEF 50-55%.  Mild concentric left ventricular hypertrophy.  Normal right ventricular size and function.  No significant valvular regurgitation or stenosis seen.  No pericardial effusion seen.        Echo 10/4/23  Result status: Final result       Left Ventricle: Normal left ventricular systolic function with a visually estimated EF of 50 - 55%. Left ventricle size is normal. Normal wall thickness. Normal wall motion. Normal diastolic function.    Tricuspid Valve: The estimated RVSP is 11 mmHg.     NM STRESS TEST WITH MYOCARDIAL PERFUSION 08/04/2023 11:16 AM, 08/04/2023  3:43 PM (Final)     Interpretation Summary    Stress Combined Conclusion: The study is most consistent with myocardial infarction. Findings suggest a high risk of cardiac events.    ECG: Resting ECG demonstrates normal sinus rhythm.

## 2024-08-18 NOTE — PROGRESS NOTES
Progress Note  Date:2024       Room:[facility]2124[facility]01  Patient Name:Estefany Garcia     YOB: 1960     Age:64 y.o.        Subjective    Subjective:  Symptoms:  Resolved.  (No shortness of breath, she did complain of chest pain earlier this am that had improved by the time I rounded with 3 NTG tablets. Cardiology ordered troponins that came back 303 & 305, up from 200's. Patient stating that she wants to be transferred to [facility] because she wants her back fixed and her pain is uncontrolled on Fentanyl & Anderson. Transfer not accepted due to Dr. Barrios does not round at [facility].).    Diet:  Adequate intake.    Activity level: Returning to normal (worse with twisting or bending).    Pain:  She complains of pain that is severe.       Review of Systems  Objective         Vitals Last 24 Hours:  TEMPERATURE:  Temp  Av.5 °F (36.4 °C)  Min: 96.3 °F (35.7 °C)  Max: 98.2 °F (36.8 °C)  RESPIRATIONS RANGE: Resp  Av.5  Min: 14  Max: 22  PULSE OXIMETRY RANGE: SpO2  Av %  Min: 90 %  Max: 97 %  PULSE RANGE: Pulse  Av.2  Min: 69  Max: 77  BLOOD PRESSURE RANGE: Systolic (24hrs), Av , Min:96 , Max:120   ; Diastolic (24hrs), Av, Min:31, Max:87    I/O (24Hr):  No intake or output data in the 24 hours ending 24 07    Objective:  General Appearance:  Comfortable.    Vital signs: (most recent): Blood pressure (!) 96/41, pulse 70, temperature 97.4 °F (36.3 °C), temperature source Oral, resp. rate 16, height 1.651 m (5' 5\"), weight 120.6 kg (265 lb 14 oz), SpO2 91%.  Vital signs are normal.      Labs/Imaging/Diagnostics    Labs:  CBC:  Recent Labs     24  0753 24  0632 24  1138   WBC 7.9 6.6 6.3   RBC 2.43* 2.53* 2.62*   HGB 9.4* 9.6* 10.1*   HCT 29.2* 30.0* 30.8*   .1* 118.3* 117.6*   RDW 19.3* 19.6* 19.5*    203 211     CHEMISTRIES:  Recent Labs     24  0633 24  0632    135   K 4.5 4.0   CL 94* 94*   CO2 26 28   BUN 35* 22  hospital, she is not a candidate for urgent surgery and [facility] is on an extended wait currently. I changed her frequency of Norco to Q4hrs and she has a pain management consult pending. Will discharge to her SNF on the same pain medication she was admitted on hopefully back to [place] - awaiting pre-cert; f/u with surgery as was planned before admission to evaluate potentially damaged back hardware.    Time spent in pre-visit planning, interview, exam, /education and coordination of care: 45 minutes.      Electronically signed by Niesha Sunshine MD on 8/18/2024 at 7:28 AM

## 2024-08-18 NOTE — PROGRESS NOTES
08/18/24 1439   Encounter Summary   Encounter Overview/Reason Volunteer Encounter   Service Provided For Patient   Last Encounter  08/18/24   Rituals, Rites and Sacraments   Type Advent Communion   Assessment/Intervention/Outcome   Intervention Prayer (assurance of)/[place]

## 2024-08-19 ENCOUNTER — APPOINTMENT (OUTPATIENT)
Age: 64
DRG: 280 | End: 2024-08-19
Attending: INTERNAL MEDICINE
Payer: COMMERCIAL

## 2024-08-19 PROBLEM — I24.9 ACUTE CORONARY SYNDROME (HCC): Status: ACTIVE | Noted: 2024-08-19

## 2024-08-19 LAB
ANION GAP SERPL CALCULATED.3IONS-SCNC: 15 MMOL/L (ref 9–17)
ANTI-XA UNFRAC HEPARIN: 0.2 IU/L (ref 0.3–0.7)
ANTI-XA UNFRAC HEPARIN: 0.36 IU/L (ref 0.3–0.7)
BASOPHILS # BLD: 0 K/UL (ref 0–0.2)
BASOPHILS NFR BLD: 0 % (ref 0–2)
BUN SERPL-MCNC: 38 MG/DL (ref 8–23)
CALCIUM SERPL-MCNC: 8.9 MG/DL (ref 8.6–10.4)
CHLORIDE SERPL-SCNC: 90 MMOL/L (ref 98–107)
CO2 SERPL-SCNC: 24 MMOL/L (ref 20–31)
CREAT SERPL-MCNC: 5.4 MG/DL (ref 0.5–0.9)
ECHO AO ROOT DIAM: 2.6 CM
ECHO AO ROOT INDEX: 1.17 CM/M2
ECHO AV AREA PEAK VELOCITY: 0.8 CM2
ECHO AV AREA VTI: 0.8 CM2
ECHO AV AREA/BSA PEAK VELOCITY: 0.4 CM2/M2
ECHO AV AREA/BSA VTI: 0.4 CM2/M2
ECHO AV MEAN GRADIENT: 4 MMHG
ECHO AV MEAN VELOCITY: 1 M/S
ECHO AV PEAK GRADIENT: 6 MMHG
ECHO AV PEAK VELOCITY: 1.3 M/S
ECHO AV VELOCITY RATIO: 0.46
ECHO AV VTI: 28.7 CM
ECHO BSA: 2.23 M2
ECHO EST RA PRESSURE: 8 MMHG
ECHO LA AREA 2C: 18.5 CM2
ECHO LA AREA 4C: 19.7 CM2
ECHO LA DIAMETER INDEX: 1.84 CM/M2
ECHO LA DIAMETER: 4.1 CM
ECHO LA MAJOR AXIS: 5.9 CM
ECHO LA MINOR AXIS: 5.2 CM
ECHO LA TO AORTIC ROOT RATIO: 1.58
ECHO LA VOL BP: 56 ML (ref 22–52)
ECHO LA VOL MOD A2C: 54 ML (ref 22–52)
ECHO LA VOL MOD A4C: 52 ML (ref 22–52)
ECHO LA VOL/BSA BIPLANE: 25 ML/M2 (ref 16–34)
ECHO LA VOLUME INDEX MOD A2C: 24 ML/M2 (ref 16–34)
ECHO LA VOLUME INDEX MOD A4C: 23 ML/M2 (ref 16–34)
ECHO LV E' LATERAL VELOCITY: 6 CM/S
ECHO LV E' SEPTAL VELOCITY: 4 CM/S
ECHO LV FRACTIONAL SHORTENING: 25 % (ref 28–44)
ECHO LV INTERNAL DIMENSION DIASTOLE INDEX: 1.79 CM/M2
ECHO LV INTERNAL DIMENSION DIASTOLIC: 4 CM (ref 3.9–5.3)
ECHO LV INTERNAL DIMENSION SYSTOLIC INDEX: 1.35 CM/M2
ECHO LV INTERNAL DIMENSION SYSTOLIC: 3 CM
ECHO LV IVSD: 1.1 CM (ref 0.6–0.9)
ECHO LV MASS 2D: 145.6 G (ref 67–162)
ECHO LV MASS INDEX 2D: 65.3 G/M2 (ref 43–95)
ECHO LV POSTERIOR WALL DIASTOLIC: 1.1 CM (ref 0.6–0.9)
ECHO LV RELATIVE WALL THICKNESS RATIO: 0.55
ECHO LVOT AREA: 1.8 CM2
ECHO LVOT AV VTI INDEX: 0.44
ECHO LVOT DIAM: 1.5 CM
ECHO LVOT MEAN GRADIENT: 1 MMHG
ECHO LVOT PEAK GRADIENT: 1 MMHG
ECHO LVOT PEAK VELOCITY: 0.6 M/S
ECHO LVOT STROKE VOLUME INDEX: 10 ML/M2
ECHO LVOT SV: 22.3 ML
ECHO LVOT VTI: 12.6 CM
ECHO MV A VELOCITY: 0.76 M/S
ECHO MV AREA VTI: 0.6 CM2
ECHO MV E DECELERATION TIME (DT): 187 MS
ECHO MV E VELOCITY: 0.97 M/S
ECHO MV E/A RATIO: 1.28
ECHO MV E/E' LATERAL: 16.17
ECHO MV E/E' RATIO (AVERAGED): 20.21
ECHO MV E/E' SEPTAL: 24.25
ECHO MV LVOT VTI INDEX: 3.19
ECHO MV MAX VELOCITY: 1.1 M/S
ECHO MV MEAN GRADIENT: 2 MMHG
ECHO MV MEAN VELOCITY: 0.7 M/S
ECHO MV PEAK GRADIENT: 5 MMHG
ECHO MV VTI: 40.2 CM
ECHO RIGHT VENTRICULAR SYSTOLIC PRESSURE (RVSP): 39 MMHG
ECHO RV TAPSE: 0.9 CM (ref 1.7–?)
ECHO TV REGURGITANT MAX VELOCITY: 2.79 M/S
ECHO TV REGURGITANT PEAK GRADIENT: 31 MMHG
EKG ATRIAL RATE: 70 BPM
EKG P AXIS: 46 DEGREES
EKG P-R INTERVAL: 162 MS
EKG Q-T INTERVAL: 426 MS
EKG QRS DURATION: 98 MS
EKG QTC CALCULATION (BAZETT): 460 MS
EKG R AXIS: 44 DEGREES
EKG T AXIS: 175 DEGREES
EKG VENTRICULAR RATE: 70 BPM
EOSINOPHIL # BLD: 0.2 K/UL (ref 0–0.4)
EOSINOPHILS RELATIVE PERCENT: 2 % (ref 0–4)
ERYTHROCYTE [DISTWIDTH] IN BLOOD BY AUTOMATED COUNT: 18.7 % (ref 11.5–14.9)
GFR, ESTIMATED: 8 ML/MIN/1.73M2
GLUCOSE BLD-MCNC: 100 MG/DL (ref 65–105)
GLUCOSE BLD-MCNC: 102 MG/DL (ref 65–105)
GLUCOSE BLD-MCNC: 69 MG/DL (ref 65–105)
GLUCOSE BLD-MCNC: 97 MG/DL (ref 65–105)
GLUCOSE SERPL-MCNC: 103 MG/DL (ref 70–99)
HCT VFR BLD AUTO: 28.3 % (ref 36–46)
HGB BLD-MCNC: 9.3 G/DL (ref 12–16)
LYMPHOCYTES NFR BLD: 0.9 K/UL (ref 1–4.8)
LYMPHOCYTES RELATIVE PERCENT: 11 % (ref 24–44)
MCH RBC QN AUTO: 38.2 PG (ref 26–34)
MCHC RBC AUTO-ENTMCNC: 32.9 G/DL (ref 31–37)
MCV RBC AUTO: 116.3 FL (ref 80–100)
MONOCYTES NFR BLD: 0.4 K/UL (ref 0.1–1.3)
MONOCYTES NFR BLD: 5 % (ref 1–7)
NEUTROPHILS NFR BLD: 82 % (ref 36–66)
NEUTS SEG NFR BLD: 6.3 K/UL (ref 1.3–9.1)
PLATELET # BLD AUTO: 171 K/UL (ref 150–450)
PMV BLD AUTO: 9 FL (ref 6–12)
POTASSIUM SERPL-SCNC: 4.4 MMOL/L (ref 3.7–5.3)
RBC # BLD AUTO: 2.43 M/UL (ref 4–5.2)
SODIUM SERPL-SCNC: 129 MMOL/L (ref 135–144)
WBC OTHER # BLD: 7.7 K/UL (ref 3.5–11)

## 2024-08-19 PROCEDURE — 36415 COLL VENOUS BLD VENIPUNCTURE: CPT

## 2024-08-19 PROCEDURE — 99232 SBSQ HOSP IP/OBS MODERATE 35: CPT | Performed by: FAMILY MEDICINE

## 2024-08-19 PROCEDURE — 90935 HEMODIALYSIS ONE EVALUATION: CPT

## 2024-08-19 PROCEDURE — 2580000003 HC RX 258: Performed by: FAMILY MEDICINE

## 2024-08-19 PROCEDURE — 99233 SBSQ HOSP IP/OBS HIGH 50: CPT | Performed by: NURSE PRACTITIONER

## 2024-08-19 PROCEDURE — 6360000002 HC RX W HCPCS: Performed by: FAMILY MEDICINE

## 2024-08-19 PROCEDURE — 93306 TTE W/DOPPLER COMPLETE: CPT

## 2024-08-19 PROCEDURE — 6370000000 HC RX 637 (ALT 250 FOR IP): Performed by: SURGERY

## 2024-08-19 PROCEDURE — 6360000002 HC RX W HCPCS: Performed by: INTERNAL MEDICINE

## 2024-08-19 PROCEDURE — 80048 BASIC METABOLIC PNL TOTAL CA: CPT

## 2024-08-19 PROCEDURE — 6370000000 HC RX 637 (ALT 250 FOR IP): Performed by: FAMILY MEDICINE

## 2024-08-19 PROCEDURE — 82947 ASSAY GLUCOSE BLOOD QUANT: CPT

## 2024-08-19 PROCEDURE — 93306 TTE W/DOPPLER COMPLETE: CPT | Performed by: INTERNAL MEDICINE

## 2024-08-19 PROCEDURE — 85025 COMPLETE CBC W/AUTO DIFF WBC: CPT

## 2024-08-19 PROCEDURE — 85520 HEPARIN ASSAY: CPT

## 2024-08-19 PROCEDURE — 2060000000 HC ICU INTERMEDIATE R&B

## 2024-08-19 RX ORDER — HEPARIN SODIUM 5000 [USP'U]/ML
5000 INJECTION, SOLUTION INTRAVENOUS; SUBCUTANEOUS EVERY 8 HOURS SCHEDULED
Status: DISCONTINUED | OUTPATIENT
Start: 2024-08-19 | End: 2024-08-20 | Stop reason: HOSPADM

## 2024-08-19 RX ORDER — ACETAMINOPHEN 325 MG/1
650 TABLET ORAL EVERY 6 HOURS PRN
Status: DISCONTINUED | OUTPATIENT
Start: 2024-08-19 | End: 2024-08-20 | Stop reason: HOSPADM

## 2024-08-19 RX ORDER — ACETAMINOPHEN 650 MG/1
650 SUPPOSITORY RECTAL EVERY 6 HOURS PRN
Status: DISCONTINUED | OUTPATIENT
Start: 2024-08-19 | End: 2024-08-20 | Stop reason: HOSPADM

## 2024-08-19 RX ADMIN — Medication 400 MG: at 21:09

## 2024-08-19 RX ADMIN — HEPARIN SODIUM 14 UNITS/KG/HR: 10000 INJECTION, SOLUTION INTRAVENOUS at 17:16

## 2024-08-19 RX ADMIN — SODIUM BICARBONATE 1300 MG: 650 TABLET ORAL at 21:08

## 2024-08-19 RX ADMIN — FENTANYL CITRATE 25 MCG: 0.05 INJECTION, SOLUTION INTRAMUSCULAR; INTRAVENOUS at 21:07

## 2024-08-19 RX ADMIN — HYDROCODONE BITARTRATE AND ACETAMINOPHEN 1 TABLET: 5; 325 TABLET ORAL at 17:10

## 2024-08-19 RX ADMIN — Medication 9 MG: at 21:08

## 2024-08-19 RX ADMIN — SEVELAMER CARBONATE 1600 MG: 800 TABLET, FILM COATED ORAL at 17:11

## 2024-08-19 RX ADMIN — DOCUSATE SODIUM 100 MG: 100 CAPSULE, LIQUID FILLED ORAL at 08:50

## 2024-08-19 RX ADMIN — VENLAFAXINE HYDROCHLORIDE 75 MG: 75 CAPSULE, EXTENDED RELEASE ORAL at 08:43

## 2024-08-19 RX ADMIN — FENTANYL CITRATE 25 MCG: 0.05 INJECTION, SOLUTION INTRAMUSCULAR; INTRAVENOUS at 00:18

## 2024-08-19 RX ADMIN — BUSPIRONE HYDROCHLORIDE 10 MG: 10 TABLET ORAL at 12:47

## 2024-08-19 RX ADMIN — CYCLOBENZAPRINE 5 MG: 10 TABLET, FILM COATED ORAL at 21:09

## 2024-08-19 RX ADMIN — HYDROCODONE BITARTRATE AND ACETAMINOPHEN 1 TABLET: 5; 325 TABLET ORAL at 10:32

## 2024-08-19 RX ADMIN — ASPIRIN 81 MG 81 MG: 81 TABLET ORAL at 08:48

## 2024-08-19 RX ADMIN — FENTANYL CITRATE 25 MCG: 0.05 INJECTION, SOLUTION INTRAMUSCULAR; INTRAVENOUS at 15:13

## 2024-08-19 RX ADMIN — PRASUGREL 10 MG: 10 TABLET, FILM COATED ORAL at 08:44

## 2024-08-19 RX ADMIN — CARVEDILOL 3.12 MG: 3.12 TABLET, FILM COATED ORAL at 21:09

## 2024-08-19 RX ADMIN — HEPARIN SODIUM 12 UNITS/KG/HR: 10000 INJECTION, SOLUTION INTRAVENOUS at 01:35

## 2024-08-19 RX ADMIN — SEVELAMER CARBONATE 1600 MG: 800 TABLET, FILM COATED ORAL at 08:44

## 2024-08-19 RX ADMIN — HEPARIN SODIUM 2000 UNITS: 1000 INJECTION INTRAVENOUS; SUBCUTANEOUS at 07:10

## 2024-08-19 RX ADMIN — CYCLOBENZAPRINE 5 MG: 10 TABLET, FILM COATED ORAL at 00:19

## 2024-08-19 RX ADMIN — CYCLOBENZAPRINE 5 MG: 10 TABLET, FILM COATED ORAL at 12:47

## 2024-08-19 RX ADMIN — CALCIUM POLYCARBOPHIL 1250 MG: 625 TABLET ORAL at 21:13

## 2024-08-19 RX ADMIN — CALCIUM POLYCARBOPHIL 1250 MG: 625 TABLET ORAL at 08:53

## 2024-08-19 RX ADMIN — FENTANYL CITRATE 25 MCG: 0.05 INJECTION, SOLUTION INTRAMUSCULAR; INTRAVENOUS at 18:07

## 2024-08-19 RX ADMIN — POTASSIUM CHLORIDE 10 MEQ: 750 CAPSULE, EXTENDED RELEASE ORAL at 21:09

## 2024-08-19 RX ADMIN — FENTANYL CITRATE 25 MCG: 0.05 INJECTION, SOLUTION INTRAMUSCULAR; INTRAVENOUS at 11:29

## 2024-08-19 RX ADMIN — SEVELAMER CARBONATE 1600 MG: 800 TABLET, FILM COATED ORAL at 12:46

## 2024-08-19 RX ADMIN — BUSPIRONE HYDROCHLORIDE 10 MG: 10 TABLET ORAL at 08:43

## 2024-08-19 RX ADMIN — HEPARIN SODIUM 5000 UNITS: 5000 INJECTION INTRAVENOUS; SUBCUTANEOUS at 21:08

## 2024-08-19 RX ADMIN — FERROUS SULFATE TAB 325 MG (65 MG ELEMENTAL FE) 325 MG: 325 (65 FE) TAB at 08:43

## 2024-08-19 RX ADMIN — ATORVASTATIN CALCIUM 80 MG: 80 TABLET, FILM COATED ORAL at 21:09

## 2024-08-19 RX ADMIN — CARVEDILOL 3.12 MG: 3.12 TABLET, FILM COATED ORAL at 08:43

## 2024-08-19 RX ADMIN — POTASSIUM CHLORIDE 10 MEQ: 750 CAPSULE, EXTENDED RELEASE ORAL at 08:43

## 2024-08-19 RX ADMIN — EPOETIN ALFA-EPBX 3000 UNITS: 3000 INJECTION, SOLUTION INTRAVENOUS; SUBCUTANEOUS at 17:12

## 2024-08-19 RX ADMIN — SODIUM CHLORIDE, PRESERVATIVE FREE 10 ML: 5 INJECTION INTRAVENOUS at 21:09

## 2024-08-19 RX ADMIN — FENTANYL CITRATE 25 MCG: 0.05 INJECTION, SOLUTION INTRAMUSCULAR; INTRAVENOUS at 07:24

## 2024-08-19 RX ADMIN — INSULIN GLARGINE 42 UNITS: 100 INJECTION, SOLUTION SUBCUTANEOUS at 21:08

## 2024-08-19 RX ADMIN — SODIUM BICARBONATE 1300 MG: 650 TABLET ORAL at 08:43

## 2024-08-19 RX ADMIN — ALLOPURINOL 100 MG: 100 TABLET ORAL at 17:10

## 2024-08-19 RX ADMIN — BUSPIRONE HYDROCHLORIDE 10 MG: 10 TABLET ORAL at 21:08

## 2024-08-19 ASSESSMENT — PAIN DESCRIPTION - LOCATION
LOCATION: BACK

## 2024-08-19 ASSESSMENT — PAIN SCALES - GENERAL
PAINLEVEL_OUTOF10: 3
PAINLEVEL_OUTOF10: 10
PAINLEVEL_OUTOF10: 0
PAINLEVEL_OUTOF10: 8
PAINLEVEL_OUTOF10: 0
PAINLEVEL_OUTOF10: 9
PAINLEVEL_OUTOF10: 10
PAINLEVEL_OUTOF10: 7
PAINLEVEL_OUTOF10: 3
PAINLEVEL_OUTOF10: 0
PAINLEVEL_OUTOF10: 9
PAINLEVEL_OUTOF10: 8
PAINLEVEL_OUTOF10: 0
PAINLEVEL_OUTOF10: 9
PAINLEVEL_OUTOF10: 10
PAINLEVEL_OUTOF10: 10
PAINLEVEL_OUTOF10: 9

## 2024-08-19 ASSESSMENT — PAIN DESCRIPTION - ORIENTATION
ORIENTATION: MID;LOWER
ORIENTATION: LOWER
ORIENTATION: MID;LOWER

## 2024-08-19 ASSESSMENT — PAIN DESCRIPTION - DESCRIPTORS
DESCRIPTORS: ACHING;STABBING
DESCRIPTORS: STABBING

## 2024-08-19 NOTE — PROGRESS NOTES
HEMODIALYSIS POST TREATMENT NOTE    Treatment time ordered: 210Mins    Actual treatment time: 40Mins    UltraFiltration Goal: 3000  UltraFiltration Removed: 500Mls      Pre Treatment weight: 120.8  Post Treatment weight: 120.3  Estimated Dry Weight: 116    Access used:     Central Venous Catheter:          Tunneled or Non-tunneled: na           Site: na          Access Flow: na      Internal Access:       AV Fistula or AV Graft: AVF          Site: ROBIN       Access Flow: 350       Sign and symptoms of infection: na       If YES: na    Medications or blood products given: see MAR    Chronic outpatient schedule: [facility]    Chronic outpatient unit: [facility]    Summary of response to treatment: Trx was ended after 40mins due to patient refusal and patient safety. Patient was encouraged to stay on trx and pain meds given by Richard Napier RN but patient continued to yell out in pain and attempting to get out of bed. Dr. Jurado notified and order was modified by Yesenia Pinon RN.    Explain if orders NOT met, was physician notified:Yes      ACES flowsheet faxed to patient unit/ placed in patient chart: yes    Post assessment completed: yes by Yesenia Pinon RN    Report given to: Juan Napier Rn      * Intra-treatment documented Safety Checks include the followin) Access and face visible at all times.     2) All connections and blood lines are secure with no kinks.     3) NVL alarm engaged.     4) Hemosafe device applied (if applicable).     5) No collapse of Arterial or Venous blood chambers.     6) All blood lines / pump segments in the air detectors.

## 2024-08-19 NOTE — FLOWSHEET NOTE
08/19/24 1824   Treatment Team Notification   Reason for Communication Evaluate   Name of Team Member Notified Dr. Sunshine   Treatment Team Role Attending Provider   Method of Communication Secure Message   Response Waiting for response   Notification Time 1824     patient is running a heparin drip at 14 units/kg/hr. last anti XA was 0.36  she's due a 1800 lab draw but the phleb tried 3x unsuccessfully.   and she's the only phleb in the hospital until 0500  cardiology updated as well.       1844 - noted, no new orders.

## 2024-08-19 NOTE — PROGRESS NOTES
Department of Internal Medicine  Nephrology Aneudy Jurado MD Progress Note    Reason for consultation: Management of hemodialysis dependent End-stage renal disease.    Consulting physician: Madonna Aviles MD.      Interval history:   Patient was seen and examined today at dialysis unit, Patient is having sharp back pain, patient is screaming with pain and wanted to come off of dialysis, Patient was taken off of dialysis after 45 mins of treatment  Pain comes and spasm and patient appears to be in severe agony.    Patient is also scheduled for cardiac cath.    History of present illness: This is a 64 y.o. female with a significant past medical history of Systemic hypertension, osteoarthritis,  coronary artery disease [s/p CABG with subsequent graft stent], s/p cerebrovascular accident, type 2 diabetes mellitus and End-stage renal disease secondary to hypertensive and diabetic nephropathy [on routine hemodialysis Monday/Wednesday/Friday at [facility] dialysis unit [facility] urinary care of Dr. Graham using left arm AV fistula], who presented to the emergency department at [facility]  8/024 with complaints of right-sided mid back pain.  She has a history of spine surgery in 2001 and had been to the [facility] ED on 8/13/2024 with CT scan of the thoracic and lumbar spine suggesting possible small hardware fracture and she was scheduled to follow-up with her spine surgeon, Dr. Pederson on 8/15/2024    Blood pressure at presentation was 90/61 mmHg. Laboratory studies at presentation remarkable for BUN/creatinine 37/5.8 mg/dL.  She did receive acute hemodialysis yesterday evening after admission.  She feels better today.  Laboratory studies today were reviewed.    Scheduled Meds:   allopurinol  100 mg Oral Once per day on Monday Wednesday Friday    lidocaine  1 patch TransDERmal Daily    aspirin  81 mg Oral Daily    atorvastatin  80 mg Oral Nightly    insulin glargine  72 Units SubCUTAneous QAM     insulin glargine  42 Units SubCUTAneous Nightly    bumetanide  3 mg Oral Once per day on     busPIRone  10 mg Oral TID    polycarbophil  1,250 mg Oral BID    carvedilol  3.125 mg Oral BID    docusate sodium  100 mg Oral BID    epoetin raphael-epbx  3,000 Units SubCUTAneous Once per day on     ferrous sulfate  325 mg Oral Daily    insulin lispro  20 Units SubCUTAneous TID WC    magnesium oxide  400 mg Oral Daily    potassium chloride  10 mEq Oral BID    prasugrel  10 mg Oral Daily    sevelamer  1,600 mg Oral TID WC    sodium bicarbonate  1,300 mg Oral BID    venlafaxine  75 mg Oral Daily with breakfast    sodium chloride flush  5-40 mL IntraVENous 2 times per day    insulin lispro  0-4 Units SubCUTAneous TID WC    insulin lispro  0-4 Units SubCUTAneous Nightly     Continuous Infusions:   heparin (PORCINE) Infusion 14 Units/kg/hr (24 0708)    dextrose      sodium chloride       PRN Meds:.HYDROcodone-acetaminophen, heparin (porcine), heparin (porcine), fentanNYL, acetaminophen, cyclobenzaprine, lidocaine-prilocaine, melatonin, midodrine, nitroGLYCERIN, polyvinyl alcohol, glucose, dextrose bolus **OR** dextrose bolus, glucagon (rDNA), dextrose, sodium chloride flush, sodium chloride, ondansetron **OR** ondansetron, polyethylene glycol, acetaminophen **OR** acetaminophen    Physical Exam:    VITALS:  BP (!) 137/58   Pulse 84   Temp 98.5 °F (36.9 °C) (Axillary)   Resp 21   Ht 1.651 m (5' 5\")   Wt 120.6 kg (265 lb 14 oz)   SpO2 94%   BMI 44.24 kg/m²   TEMPERATURE:  Current - Temp: 98.5 °F (36.9 °C); Max - Temp  Av.7 °F (36.5 °C)  Min: 97.3 °F (36.3 °C)  Max: 98.5 °F (36.9 °C)  RESPIRATIONS RANGE: Resp  Av  Min: 14  Max: 21  PULSE RANGE: Pulse  Av.1  Min: 70  Max: 84  BLOOD PRESSURE RANGE:  Systolic (24hrs), Av , Min:99 , Max:137   ; Diastolic (24hrs), Av, Min:44, Max:71    PULSE OXIMETRY RANGE: SpO2  Av.3 %  Min: 88 %  Max: 97  %  24HR INTAKE/OUTPUT:    Intake/Output Summary (Last 24 hours) at 8/19/2024 1148  Last data filed at 8/19/2024 0135  Gross per 24 hour   Intake 468.93 ml   Output --   Net 468.93 ml     Constitutional: alert, appears stated age, and cooperative    Skin: Skin color, texture, turgor normal. No rashes or lesions    Head: Normocephalic, without obvious abnormality, atraumatic     ENT:  no epistaxis or rhinorrhea    Neck:  supple with no jugular venous distention.     Cardiovascular/Edema: regular rate and rhythm, S1, S2 normal, no murmur, click, rub or gallop    Respiratory: Lungs: clear to auscultation bilaterally    Abdomen: soft, non-tender; bowel sounds normal; no masses,  no organomegaly    Back: symmetric, no curvature. ROM normal. No CVA tenderness.    Extremities: extremities normal, atraumatic, no cyanosis or edema +left arm AV fistula with good thrill and bruit    Neuro:  Grossly normal    CBC:   Recent Labs     08/17/24  1138 08/18/24  0735 08/19/24  0550   WBC 6.3 6.4 7.7   HGB 10.1* 9.6* 9.3*    178 171     BMP:    Recent Labs     08/17/24  0632 08/18/24  0735 08/19/24  0550    129* 129*   K 4.0 4.0 4.4   CL 94* 90* 90*   CO2 28 25 24   BUN 22 32* 38*   CREATININE 4.0* 5.1* 5.4*   GLUCOSE 136* 135* 103*     Lab Results   Component Value Date/Time    NITRU NEGATIVE 06/11/2024 12:00 PM    COLORU Yellow 06/11/2024 12:00 PM    PHUR 5.0 06/11/2024 12:00 PM    PHUR 5.5 08/05/2023 03:47 PM    WBCUA TOO NUMEROUS TO COUNT 06/11/2024 12:00 PM    RBCUA 0 TO 2 06/11/2024 12:00 PM    MUCUS 1+ 02/07/2023 03:00 PM    TRICHOMONAS NOT REPORTED 11/14/2021 02:05 AM    YEAST MODERATE 08/05/2023 03:47 PM    BACTERIA MANY 06/11/2024 12:00 PM    LEUKOCYTESUR LARGE 06/11/2024 12:00 PM    UROBILINOGEN Normal 06/11/2024 12:00 PM    BILIRUBINUR NEGATIVE 06/11/2024 12:00 PM    GLUCOSEU NEGATIVE 06/11/2024 12:00 PM    KETUA TRACE 06/11/2024 12:00 PM    AMORPHOUS 1+ 02/25/2023 03:15 AM     Urine Chloride:    Lab Results

## 2024-08-19 NOTE — PROGRESS NOTES
Occupational Therapy  [facility]   OCCUPATIONAL THERAPY MISSED TREATMENT NOTE   INPATIENT   Date: 24  Patient Name: Estefany Garcia       Room:   MRN: [id number]   Account #: [id number]    : 1960  (64 y.o.)  Gender: female   Referring Practitioner: Dr. Aviles  Diagnosis: ESRD; Fluid overload; Acute exacerbation of chronis LBP             REASON FOR MISSED TREATMENT:  Patient at testing and/or off the floor   -   Hemodialysis         Electronically signed by DAYANA Arce on 24 at 12:17 PM EDT

## 2024-08-19 NOTE — PROGRESS NOTES
[facility]   Progress Note                   Date:   8/19/2024  Patient name: Estefany Garcia  Date of admission:  8/14/2024 10:46 AM  MRN:   [id number]  YOB: 1960  PCP: Zulma Payne APRN - CNP    Reason for Admission: ESRD (end stage renal disease) on dialysis (HCC) [N18.6, Z99.2]  Fluid overload [E87.70]  Acute exacerbation of chronic low back pain [M54.50, G89.29]  Hypervolemia, unspecified hypervolemia type [E87.70]    Subjective:       Clinical Changes / Abnormalities: Pt seen and examined in the room.  Patient resting in chair. Pt denies any CP or sob.  Labs, vitals and tele reviewed. SR 80    Medications:   Scheduled Meds:   allopurinol  100 mg Oral Once per day on Monday Wednesday Friday    lidocaine  1 patch TransDERmal Daily    aspirin  81 mg Oral Daily    atorvastatin  80 mg Oral Nightly    insulin glargine  72 Units SubCUTAneous QAM    insulin glargine  42 Units SubCUTAneous Nightly    bumetanide  3 mg Oral Once per day on Sunday Tuesday Thursday Saturday    busPIRone  10 mg Oral TID    polycarbophil  1,250 mg Oral BID    carvedilol  3.125 mg Oral BID    docusate sodium  100 mg Oral BID    epoetin raphael-epbx  3,000 Units SubCUTAneous Once per day on Monday Wednesday Friday    ferrous sulfate  325 mg Oral Daily    insulin lispro  20 Units SubCUTAneous TID WC    magnesium oxide  400 mg Oral Daily    potassium chloride  10 mEq Oral BID    prasugrel  10 mg Oral Daily    sevelamer  1,600 mg Oral TID WC    sodium bicarbonate  1,300 mg Oral BID    venlafaxine  75 mg Oral Daily with breakfast    sodium chloride flush  5-40 mL IntraVENous 2 times per day    insulin lispro  0-4 Units SubCUTAneous TID WC    insulin lispro  0-4 Units SubCUTAneous Nightly     Continuous Infusions:   heparin (PORCINE) Infusion 14 Units/kg/hr (08/19/24 0708)    dextrose      sodium chloride       CBC:   Recent Labs     08/17/24  1138 08/18/24  0735 08/19/24  0550   WBC 6.3 6.4 7.7   HGB 10.1* 9.6* 9.3*

## 2024-08-19 NOTE — FLOWSHEET NOTE
08/19/24 1823   Treatment Team Notification   Reason for Communication Evaluate   Name of Team Member Notified Dr. Tamayo   Treatment Team Role Consulting Provider   Method of Communication Secure Message   Response Waiting for response   Notification Time 1823     patient is running a heparin drip at 14 units/kg/hr. last anti XA was 0.36  she's due a 1800 lab draw but the phleb tried 3x unsuccessfully.   and she's the only phleb in the hospital until 0500    1845 - per Dr. Tamayo - discontinue heparin drip and begin Heparin subq 5K units Q8 first dose at 2000.

## 2024-08-19 NOTE — PROGRESS NOTES
Progress Note  Date:2024       Room:2124/2124-01  Patient Name:Estefany Garcia     YOB: 1960     Age:64 y.o.        Subjective    Subjective:  Symptoms:  Resolved.  (No shortness of breath, continues to complain of back pain. This am she was preoccupied on the phone updating a family member.).    Diet:  Adequate intake.    Activity level: Returning to normal (worse with twisting or bending).    Pain:  She complains of pain that is severe.       Review of Systems  Objective         Vitals Last 24 Hours:  TEMPERATURE:  Temp  Av.6 °F (36.4 °C)  Min: 97.3 °F (36.3 °C)  Max: 97.8 °F (36.6 °C)  RESPIRATIONS RANGE: Resp  Avg: 15.6  Min: 12  Max: 20  PULSE OXIMETRY RANGE: SpO2  Av.2 %  Min: 88 %  Max: 97 %  PULSE RANGE: Pulse  Av.1  Min: 70  Max: 83  BLOOD PRESSURE RANGE: Systolic (24hrs), Av , Min:94 , Max:134   ; Diastolic (24hrs), Av, Min:44, Max:62    I/O (24Hr):    Intake/Output Summary (Last 24 hours) at 2024 0622  Last data filed at 2024 0135  Gross per 24 hour   Intake 808.93 ml   Output --   Net 808.93 ml       Objective:  General Appearance:  Comfortable.    Vital signs: (most recent): Blood pressure (!) 119/58, pulse 83, temperature 97.8 °F (36.6 °C), temperature source Axillary, resp. rate 16, height 1.651 m (5' 5\"), weight 120.6 kg (265 lb 14 oz), SpO2 92%.  Vital signs are normal.      Labs/Imaging/Diagnostics    Labs:  CBC:  Recent Labs     24  0632 24  1138 24  0735   WBC 6.6 6.3 6.4   RBC 2.53* 2.62* 2.51*   HGB 9.6* 10.1* 9.6*   HCT 30.0* 30.8* 30.0*   .3* 117.6* 119.7*   RDW 19.6* 19.5* 19.6*    211 178     CHEMISTRIES:  Recent Labs     24  0633 24  0632 24  0735    135 129*   K 4.5 4.0 4.0   CL 94* 94* 90*   CO2 26 28 25   BUN 35* 22 32*   CREATININE 5.4* 4.0* 5.1*   GLUCOSE 61* 136* 135*     PT/INR:  Recent Labs     24  1138   PROTIME 15.2*   INR 1.2     APTT:  Recent Labs     24  1138

## 2024-08-19 NOTE — PROGRESS NOTES
HEMODIALYSIS PRE-TREATMENT NOTE    Patient Identifiers prior to treatment:  MRN NAME    Isolation Required: yes                      Isolation Type: contact       (please document if patient is being managed as a PUI/COVID-19 patient)        Hepatitis status:                           Date Drawn                             Result  Hepatitis B Surface Antigen 24 neg        Hepatitis B Surface Antibody 3/18/24 1000        Hepatitis B Core Antibody 3/18/24 negf          How was Hepatitis Status verified: epic     Was a copy of the labs you documented provided to facility for the patient's chart: yes    Hemodialysis orders verified: yes    Access Within normal limits ( I.e. s/s of infection,...): yes     Pre-Assessment completed: yes by Yesenia Pinon RN    Pre-dialysis report received from: Juan contreras Rn                 Time:

## 2024-08-19 NOTE — CARE COORDINATION
Writer is following for potential discharge to [place] [facility].  Writer placed call to Yodit with [facility] at [facility]. Authorization is pending at this time.  Electronically signed by AZ Saez on 8/19/2024 at 12:14 PM    Writer met with pt for update, requests call placed to sister Hailey. Writer placed call to Hailey for update on authorization. Hailey has more medical questions for bedside RN. Writer transferred call to [facility] nurses station.  Electronically signed by AZ Saez on 8/19/2024 at 1:35 PM

## 2024-08-19 NOTE — PROGRESS NOTES
Writer spoke with Chelsi at [facility]. Transport was set up for a 7:15am pickup to transport patient to [facility] cath lab.

## 2024-08-20 ENCOUNTER — HOSPITAL ENCOUNTER (INPATIENT)
Age: 64
LOS: 4 days | Discharge: NURSING, REHAB OR LONG-TERM CARE | DRG: 280 | End: 2024-08-24
Attending: FAMILY MEDICINE | Admitting: FAMILY MEDICINE
Payer: COMMERCIAL

## 2024-08-20 ENCOUNTER — HOSPITAL ENCOUNTER (OUTPATIENT)
Age: 64
Discharge: NURSING, REHAB OR LONG-TERM CARE | DRG: 287 | End: 2024-08-20
Attending: INTERNAL MEDICINE | Admitting: INTERNAL MEDICINE
Payer: COMMERCIAL

## 2024-08-20 VITALS
SYSTOLIC BLOOD PRESSURE: 109 MMHG | HEART RATE: 81 BPM | DIASTOLIC BLOOD PRESSURE: 48 MMHG | HEIGHT: 65 IN | TEMPERATURE: 98.2 F | OXYGEN SATURATION: 93 % | RESPIRATION RATE: 14 BRPM | BODY MASS INDEX: 44.19 KG/M2 | WEIGHT: 265.21 LBS

## 2024-08-20 VITALS
DIASTOLIC BLOOD PRESSURE: 55 MMHG | SYSTOLIC BLOOD PRESSURE: 105 MMHG | TEMPERATURE: 98.1 F | RESPIRATION RATE: 9 BRPM | OXYGEN SATURATION: 100 % | HEART RATE: 70 BPM

## 2024-08-20 DIAGNOSIS — I20.9 ANGINA PECTORIS (HCC): ICD-10-CM

## 2024-08-20 DIAGNOSIS — M48.062 SPINAL STENOSIS OF LUMBAR REGION WITH NEUROGENIC CLAUDICATION: Primary | ICD-10-CM

## 2024-08-20 DIAGNOSIS — G89.29 CHRONIC BILATERAL LOW BACK PAIN WITHOUT SCIATICA: ICD-10-CM

## 2024-08-20 DIAGNOSIS — M54.50 CHRONIC BILATERAL LOW BACK PAIN WITHOUT SCIATICA: ICD-10-CM

## 2024-08-20 DIAGNOSIS — I21.4 NSTEMI (NON-ST ELEVATED MYOCARDIAL INFARCTION) (HCC): Primary | ICD-10-CM

## 2024-08-20 LAB
GLUCOSE BLD-MCNC: 116 MG/DL (ref 65–105)
GLUCOSE BLD-MCNC: 53 MG/DL (ref 65–105)

## 2024-08-20 PROCEDURE — B2151ZZ FLUOROSCOPY OF LEFT HEART USING LOW OSMOLAR CONTRAST: ICD-10-PCS | Performed by: INTERNAL MEDICINE

## 2024-08-20 PROCEDURE — 6370000000 HC RX 637 (ALT 250 FOR IP): Performed by: INTERNAL MEDICINE

## 2024-08-20 PROCEDURE — 92921 HC PRQ CARDIAC ANGIO ADDL ART: CPT | Performed by: INTERNAL MEDICINE

## 2024-08-20 PROCEDURE — 2709999900 HC NON-CHARGEABLE SUPPLY: Performed by: INTERNAL MEDICINE

## 2024-08-20 PROCEDURE — 7100000011 HC PHASE II RECOVERY - ADDTL 15 MIN: Performed by: INTERNAL MEDICINE

## 2024-08-20 PROCEDURE — B2111ZZ FLUOROSCOPY OF MULTIPLE CORONARY ARTERIES USING LOW OSMOLAR CONTRAST: ICD-10-PCS | Performed by: INTERNAL MEDICINE

## 2024-08-20 PROCEDURE — 6360000002 HC RX W HCPCS: Performed by: FAMILY MEDICINE

## 2024-08-20 PROCEDURE — 82947 ASSAY GLUCOSE BLOOD QUANT: CPT

## 2024-08-20 PROCEDURE — 93459 L HRT ART/GRFT ANGIO: CPT | Performed by: INTERNAL MEDICINE

## 2024-08-20 PROCEDURE — 92920 PRQ TRLUML C ANGIOP 1ART&/BR: CPT | Performed by: INTERNAL MEDICINE

## 2024-08-20 PROCEDURE — C1887 CATHETER, GUIDING: HCPCS | Performed by: INTERNAL MEDICINE

## 2024-08-20 PROCEDURE — 4A023N7 MEASUREMENT OF CARDIAC SAMPLING AND PRESSURE, LEFT HEART, PERCUTANEOUS APPROACH: ICD-10-PCS | Performed by: INTERNAL MEDICINE

## 2024-08-20 PROCEDURE — 2060000000 HC ICU INTERMEDIATE R&B

## 2024-08-20 PROCEDURE — 2580000003 HC RX 258: Performed by: FAMILY MEDICINE

## 2024-08-20 PROCEDURE — 2580000003 HC RX 258: Performed by: INTERNAL MEDICINE

## 2024-08-20 PROCEDURE — 99152 MOD SED SAME PHYS/QHP 5/>YRS: CPT | Performed by: INTERNAL MEDICINE

## 2024-08-20 PROCEDURE — 93455 CORONARY ART/GRFT ANGIO S&I: CPT | Performed by: INTERNAL MEDICINE

## 2024-08-20 PROCEDURE — 99239 HOSP IP/OBS DSCHRG MGMT >30: CPT | Performed by: FAMILY MEDICINE

## 2024-08-20 PROCEDURE — 1200000000 HC SEMI PRIVATE

## 2024-08-20 PROCEDURE — 6360000004 HC RX CONTRAST MEDICATION: Performed by: INTERNAL MEDICINE

## 2024-08-20 PROCEDURE — 2500000003 HC RX 250 WO HCPCS: Performed by: INTERNAL MEDICINE

## 2024-08-20 PROCEDURE — C1894 INTRO/SHEATH, NON-LASER: HCPCS | Performed by: INTERNAL MEDICINE

## 2024-08-20 PROCEDURE — C1725 CATH, TRANSLUMIN NON-LASER: HCPCS | Performed by: INTERNAL MEDICINE

## 2024-08-20 PROCEDURE — 99153 MOD SED SAME PHYS/QHP EA: CPT | Performed by: INTERNAL MEDICINE

## 2024-08-20 PROCEDURE — 6360000002 HC RX W HCPCS: Performed by: STUDENT IN AN ORGANIZED HEALTH CARE EDUCATION/TRAINING PROGRAM

## 2024-08-20 PROCEDURE — 6360000002 HC RX W HCPCS: Performed by: INTERNAL MEDICINE

## 2024-08-20 PROCEDURE — 6370000000 HC RX 637 (ALT 250 FOR IP): Performed by: FAMILY MEDICINE

## 2024-08-20 PROCEDURE — C1769 GUIDE WIRE: HCPCS | Performed by: INTERNAL MEDICINE

## 2024-08-20 PROCEDURE — 7100000010 HC PHASE II RECOVERY - FIRST 15 MIN: Performed by: INTERNAL MEDICINE

## 2024-08-20 RX ORDER — NITROGLYCERIN 20 MG/100ML
INJECTION INTRAVENOUS PRN
Status: DISCONTINUED | OUTPATIENT
Start: 2024-08-20 | End: 2024-08-20 | Stop reason: HOSPADM

## 2024-08-20 RX ORDER — INSULIN GLARGINE 100 [IU]/ML
72 INJECTION, SOLUTION SUBCUTANEOUS EVERY MORNING
Status: DISCONTINUED | OUTPATIENT
Start: 2024-08-21 | End: 2024-08-24 | Stop reason: HOSPADM

## 2024-08-20 RX ORDER — ACETAMINOPHEN 325 MG/1
650 TABLET ORAL EVERY 6 HOURS PRN
Status: DISCONTINUED | OUTPATIENT
Start: 2024-08-20 | End: 2024-08-24 | Stop reason: HOSPADM

## 2024-08-20 RX ORDER — FENTANYL CITRATE 0.05 MG/ML
25 INJECTION, SOLUTION INTRAMUSCULAR; INTRAVENOUS EVERY 4 HOURS PRN
Status: DISCONTINUED | OUTPATIENT
Start: 2024-08-20 | End: 2024-08-24 | Stop reason: HOSPADM

## 2024-08-20 RX ORDER — MIDAZOLAM HYDROCHLORIDE 1 MG/ML
INJECTION INTRAMUSCULAR; INTRAVENOUS PRN
Status: DISCONTINUED | OUTPATIENT
Start: 2024-08-20 | End: 2024-08-20 | Stop reason: HOSPADM

## 2024-08-20 RX ORDER — LIDOCAINE/PRILOCAINE 2.5 %-2.5%
CREAM (GRAM) TOPICAL DAILY PRN
Status: DISCONTINUED | OUTPATIENT
Start: 2024-08-20 | End: 2024-08-24 | Stop reason: HOSPADM

## 2024-08-20 RX ORDER — ALLOPURINOL 100 MG/1
100 TABLET ORAL DAILY
Status: DISCONTINUED | OUTPATIENT
Start: 2024-08-20 | End: 2024-08-24 | Stop reason: HOSPADM

## 2024-08-20 RX ORDER — CYCLOBENZAPRINE HCL 10 MG
5 TABLET ORAL 3 TIMES DAILY PRN
Status: DISCONTINUED | OUTPATIENT
Start: 2024-08-20 | End: 2024-08-24 | Stop reason: HOSPADM

## 2024-08-20 RX ORDER — BIVALIRUDIN 250 MG/5ML
INJECTION, POWDER, LYOPHILIZED, FOR SOLUTION INTRAVENOUS PRN
Status: DISCONTINUED | OUTPATIENT
Start: 2024-08-20 | End: 2024-08-20 | Stop reason: HOSPADM

## 2024-08-20 RX ORDER — ATORVASTATIN CALCIUM 80 MG/1
80 TABLET, FILM COATED ORAL NIGHTLY
Status: DISCONTINUED | OUTPATIENT
Start: 2024-08-20 | End: 2024-08-24 | Stop reason: HOSPADM

## 2024-08-20 RX ORDER — SODIUM BICARBONATE 650 MG/1
1300 TABLET ORAL 2 TIMES DAILY
Status: DISCONTINUED | OUTPATIENT
Start: 2024-08-20 | End: 2024-08-24 | Stop reason: HOSPADM

## 2024-08-20 RX ORDER — POTASSIUM CHLORIDE 750 MG/1
10 CAPSULE, EXTENDED RELEASE ORAL 2 TIMES DAILY
Status: DISCONTINUED | OUTPATIENT
Start: 2024-08-20 | End: 2024-08-24 | Stop reason: HOSPADM

## 2024-08-20 RX ORDER — CARVEDILOL 3.12 MG/1
3.12 TABLET ORAL 2 TIMES DAILY
Status: DISCONTINUED | OUTPATIENT
Start: 2024-08-20 | End: 2024-08-24 | Stop reason: HOSPADM

## 2024-08-20 RX ORDER — BUMETANIDE 1 MG/1
3 TABLET ORAL
Status: DISCONTINUED | OUTPATIENT
Start: 2024-08-20 | End: 2024-08-24 | Stop reason: HOSPADM

## 2024-08-20 RX ORDER — DEXTROSE MONOHYDRATE 100 MG/ML
INJECTION, SOLUTION INTRAVENOUS CONTINUOUS PRN
Status: DISCONTINUED | OUTPATIENT
Start: 2024-08-20 | End: 2024-08-24 | Stop reason: HOSPADM

## 2024-08-20 RX ORDER — SODIUM CHLORIDE 9 MG/ML
INJECTION, SOLUTION INTRAVENOUS CONTINUOUS
Status: DISCONTINUED | OUTPATIENT
Start: 2024-08-20 | End: 2024-08-20 | Stop reason: HOSPADM

## 2024-08-20 RX ORDER — MIDODRINE HYDROCHLORIDE 5 MG/1
5 TABLET ORAL 2 TIMES DAILY PRN
Status: DISCONTINUED | OUTPATIENT
Start: 2024-08-20 | End: 2024-08-24 | Stop reason: HOSPADM

## 2024-08-20 RX ORDER — BUSPIRONE HYDROCHLORIDE 10 MG/1
10 TABLET ORAL 3 TIMES DAILY
Status: DISCONTINUED | OUTPATIENT
Start: 2024-08-20 | End: 2024-08-24 | Stop reason: HOSPADM

## 2024-08-20 RX ORDER — SODIUM CHLORIDE 9 MG/ML
INJECTION, SOLUTION INTRAVENOUS CONTINUOUS
Status: DISCONTINUED | OUTPATIENT
Start: 2024-08-20 | End: 2024-08-24 | Stop reason: HOSPADM

## 2024-08-20 RX ORDER — POLYVINYL ALCOHOL 14 MG/ML
1 SOLUTION/ DROPS OPHTHALMIC PRN
Status: DISCONTINUED | OUTPATIENT
Start: 2024-08-20 | End: 2024-08-24 | Stop reason: HOSPADM

## 2024-08-20 RX ORDER — ASPIRIN 81 MG/1
TABLET, CHEWABLE ORAL PRN
Status: DISCONTINUED | OUTPATIENT
Start: 2024-08-20 | End: 2024-08-20 | Stop reason: HOSPADM

## 2024-08-20 RX ORDER — ASPIRIN 81 MG/1
81 TABLET, CHEWABLE ORAL DAILY
Status: DISCONTINUED | OUTPATIENT
Start: 2024-08-20 | End: 2024-08-24 | Stop reason: HOSPADM

## 2024-08-20 RX ORDER — VENLAFAXINE HYDROCHLORIDE 75 MG/1
75 CAPSULE, EXTENDED RELEASE ORAL
Status: DISCONTINUED | OUTPATIENT
Start: 2024-08-21 | End: 2024-08-24 | Stop reason: HOSPADM

## 2024-08-20 RX ORDER — LIDOCAINE 4 G/G
1 PATCH TOPICAL DAILY
Status: DISCONTINUED | OUTPATIENT
Start: 2024-08-20 | End: 2024-08-20 | Stop reason: SDUPTHER

## 2024-08-20 RX ORDER — DOCUSATE SODIUM 100 MG/1
100 CAPSULE, LIQUID FILLED ORAL 2 TIMES DAILY
Status: DISCONTINUED | OUTPATIENT
Start: 2024-08-20 | End: 2024-08-24 | Stop reason: HOSPADM

## 2024-08-20 RX ORDER — HYDROCODONE BITARTRATE AND ACETAMINOPHEN 5; 325 MG/1; MG/1
1 TABLET ORAL EVERY 4 HOURS PRN
Status: DISCONTINUED | OUTPATIENT
Start: 2024-08-20 | End: 2024-08-24 | Stop reason: HOSPADM

## 2024-08-20 RX ORDER — FERROUS SULFATE 325(65) MG
325 TABLET ORAL DAILY
Status: DISCONTINUED | OUTPATIENT
Start: 2024-08-20 | End: 2024-08-24 | Stop reason: HOSPADM

## 2024-08-20 RX ORDER — INSULIN GLARGINE 100 [IU]/ML
42 INJECTION, SOLUTION SUBCUTANEOUS NIGHTLY
Status: DISCONTINUED | OUTPATIENT
Start: 2024-08-20 | End: 2024-08-24 | Stop reason: HOSPADM

## 2024-08-20 RX ORDER — PRASUGREL 10 MG/1
TABLET, FILM COATED ORAL PRN
Status: DISCONTINUED | OUTPATIENT
Start: 2024-08-20 | End: 2024-08-20 | Stop reason: HOSPADM

## 2024-08-20 RX ORDER — INSULIN LISPRO 100 [IU]/ML
0-4 INJECTION, SOLUTION INTRAVENOUS; SUBCUTANEOUS NIGHTLY
Status: DISCONTINUED | OUTPATIENT
Start: 2024-08-20 | End: 2024-08-24 | Stop reason: HOSPADM

## 2024-08-20 RX ORDER — NITROGLYCERIN 0.4 MG/1
0.4 TABLET SUBLINGUAL EVERY 5 MIN PRN
Status: DISCONTINUED | OUTPATIENT
Start: 2024-08-20 | End: 2024-08-24 | Stop reason: HOSPADM

## 2024-08-20 RX ORDER — PRASUGREL 10 MG/1
10 TABLET, FILM COATED ORAL DAILY
Status: DISCONTINUED | OUTPATIENT
Start: 2024-08-20 | End: 2024-08-24 | Stop reason: HOSPADM

## 2024-08-20 RX ORDER — LANOLIN ALCOHOL/MO/W.PET/CERES
9 CREAM (GRAM) TOPICAL NIGHTLY PRN
Status: DISCONTINUED | OUTPATIENT
Start: 2024-08-20 | End: 2024-08-24 | Stop reason: HOSPADM

## 2024-08-20 RX ORDER — FENTANYL CITRATE 50 UG/ML
INJECTION, SOLUTION INTRAMUSCULAR; INTRAVENOUS PRN
Status: DISCONTINUED | OUTPATIENT
Start: 2024-08-20 | End: 2024-08-20 | Stop reason: HOSPADM

## 2024-08-20 RX ORDER — LIDOCAINE 4 G/G
1 PATCH TOPICAL DAILY
Status: DISCONTINUED | OUTPATIENT
Start: 2024-08-20 | End: 2024-08-24 | Stop reason: HOSPADM

## 2024-08-20 RX ORDER — INSULIN LISPRO 100 [IU]/ML
0-8 INJECTION, SOLUTION INTRAVENOUS; SUBCUTANEOUS
Status: DISCONTINUED | OUTPATIENT
Start: 2024-08-21 | End: 2024-08-24 | Stop reason: HOSPADM

## 2024-08-20 RX ORDER — MIDODRINE HYDROCHLORIDE 5 MG/1
5 TABLET ORAL PRN
Status: DISCONTINUED | OUTPATIENT
Start: 2024-08-20 | End: 2024-08-20

## 2024-08-20 RX ADMIN — FERROUS SULFATE TAB 325 MG (65 MG ELEMENTAL FE) 325 MG: 325 (65 FE) TAB at 21:23

## 2024-08-20 RX ADMIN — FENTANYL CITRATE 50 MCG: 50 INJECTION, SOLUTION INTRAMUSCULAR; INTRAVENOUS at 14:25

## 2024-08-20 RX ADMIN — BUSPIRONE HYDROCHLORIDE 10 MG: 10 TABLET ORAL at 21:21

## 2024-08-20 RX ADMIN — SODIUM BICARBONATE 1300 MG: 650 TABLET ORAL at 21:21

## 2024-08-20 RX ADMIN — CYCLOBENZAPRINE 5 MG: 10 TABLET, FILM COATED ORAL at 21:21

## 2024-08-20 RX ADMIN — SODIUM CHLORIDE: 9 INJECTION, SOLUTION INTRAVENOUS at 21:33

## 2024-08-20 RX ADMIN — DOCUSATE SODIUM 100 MG: 100 CAPSULE, LIQUID FILLED ORAL at 21:22

## 2024-08-20 RX ADMIN — POTASSIUM CHLORIDE 10 MEQ: 750 CAPSULE, EXTENDED RELEASE ORAL at 21:23

## 2024-08-20 RX ADMIN — ASPIRIN 81 MG: 81 TABLET, CHEWABLE ORAL at 21:22

## 2024-08-20 RX ADMIN — SODIUM CHLORIDE: 900 INJECTION INTRAVENOUS at 14:29

## 2024-08-20 RX ADMIN — ALLOPURINOL 100 MG: 100 TABLET ORAL at 21:22

## 2024-08-20 RX ADMIN — CARVEDILOL 3.12 MG: 3.12 TABLET, FILM COATED ORAL at 21:22

## 2024-08-20 RX ADMIN — INSULIN GLARGINE 42 UNITS: 100 INJECTION, SOLUTION SUBCUTANEOUS at 21:20

## 2024-08-20 RX ADMIN — PRASUGREL 10 MG: 10 TABLET, FILM COATED ORAL at 22:37

## 2024-08-20 RX ADMIN — DEXTROSE MONOHYDRATE 125 ML: 100 INJECTION, SOLUTION INTRAVENOUS at 07:11

## 2024-08-20 RX ADMIN — HYDROCODONE BITARTRATE AND ACETAMINOPHEN 1 TABLET: 5; 325 TABLET ORAL at 23:40

## 2024-08-20 RX ADMIN — FENTANYL CITRATE 25 MCG: 0.05 INJECTION, SOLUTION INTRAMUSCULAR; INTRAVENOUS at 07:25

## 2024-08-20 RX ADMIN — FENTANYL CITRATE 25 MCG: 0.05 INJECTION, SOLUTION INTRAMUSCULAR; INTRAVENOUS at 21:20

## 2024-08-20 RX ADMIN — ATORVASTATIN CALCIUM 80 MG: 80 TABLET, FILM COATED ORAL at 21:22

## 2024-08-20 ASSESSMENT — PAIN DESCRIPTION - DESCRIPTORS
DESCRIPTORS: ACHING
DESCRIPTORS: ACHING;STABBING

## 2024-08-20 ASSESSMENT — PAIN SCALES - GENERAL
PAINLEVEL_OUTOF10: 10
PAINLEVEL_OUTOF10: 0
PAINLEVEL_OUTOF10: 9
PAINLEVEL_OUTOF10: 10
PAINLEVEL_OUTOF10: 0
PAINLEVEL_OUTOF10: 10

## 2024-08-20 ASSESSMENT — PAIN DESCRIPTION - LOCATION
LOCATION: BACK
LOCATION: BACK

## 2024-08-20 ASSESSMENT — PAIN DESCRIPTION - ORIENTATION
ORIENTATION: MID
ORIENTATION: MID

## 2024-08-20 NOTE — PROGRESS NOTES
Progress Note  Date:2024       Room:2124/2124-01  Patient Name:Estefany Garcia     YOB: 1960     Age:64 y.o.        Subjective    Subjective:  Symptoms:  Resolved.  (No shortness of breath, continues to complain of back pain. This am she was transferred to [facility] for a cardiac cath.).    Diet:  Adequate intake.    Activity level: Returning to normal (worse with twisting or bending).    Pain:  She complains of pain that is severe.       Review of Systems  Objective         Vitals Last 24 Hours:  TEMPERATURE:  Temp  Av.1 °F (36.7 °C)  Min: 97.9 °F (36.6 °C)  Max: 98.5 °F (36.9 °C)  RESPIRATIONS RANGE: Resp  Av.2  Min: 12  Max: 21  PULSE OXIMETRY RANGE: SpO2  Av.6 %  Min: 93 %  Max: 96 %  PULSE RANGE: Pulse  Av.9  Min: 43  Max: 122  BLOOD PRESSURE RANGE: Systolic (24hrs), Av , Min:93 , Max:152   ; Diastolic (24hrs), Av, Min:41, Max:71    I/O (24Hr):    Intake/Output Summary (Last 24 hours) at 2024 0559  Last data filed at 2024 1108  Gross per 24 hour   Intake 500 ml   Output 1000 ml   Net -500 ml       Objective:  General Appearance:  Comfortable.    Vital signs: (most recent): Blood pressure (!) 109/48, pulse 81, temperature 98.2 °F (36.8 °C), temperature source Oral, resp. rate 14, height 1.651 m (5' 5\"), weight 120.3 kg (265 lb 3.4 oz), SpO2 93%.  Vital signs are normal.      Labs/Imaging/Diagnostics    Labs:  CBC:  Recent Labs     24  1138 24  0735 24  0550   WBC 6.3 6.4 7.7   RBC 2.62* 2.51* 2.43*   HGB 10.1* 9.6* 9.3*   HCT 30.8* 30.0* 28.3*   .6* 119.7* 116.3*   RDW 19.5* 19.6* 18.7*    178 171     CHEMISTRIES:  Recent Labs     24  0632 24  0735 24  0550    129* 129*   K 4.0 4.0 4.4   CL 94* 90* 90*   CO2 28 25 24   BUN 22 32* 38*   CREATININE 4.0* 5.1* 5.4*   GLUCOSE 136* 135* 103*     PT/INR:  Recent Labs     24  1138   PROTIME 15.2*   INR 1.2     APTT:  Recent Labs     24  1138   APTT  27.1     LIVER PROFILE:  No results for input(s): \"AST\", \"ALT\", \"BILIDIR\", \"BILITOT\", \"ALKPHOS\" in the last 72 hours.      Imaging Last 24 Hours:  XR CHEST PORTABLE    Result Date: 8/14/2024  EXAMINATION: ONE XRAY VIEW OF THE CHEST 8/14/2024 1:09 pm COMPARISON: 07/19/2024 HISTORY: ORDERING SYSTEM PROVIDED HISTORY: fluid overload TECHNOLOGIST PROVIDED HISTORY: fluid overload Reason for Exam: Pt c/o fluid overload  x 1 week FINDINGS: Status post median sternotomy.  Cardiomegaly.  Pulmonary vascular congestion. No focal airspace disease.  No pneumothorax.  No pleural effusion.     Cardiomegaly with pulmonary vascular congestion     Assessment//Plan           Hospital Problems             Last Modified POA    * (Principal) Pulmonary congestion 8/15/2024 Yes    Acute coronary syndrome (HCC) 8/19/2024 Yes    Chronic respiratory failure with hypoxia (HCC) 8/15/2024 Yes    Chronic diastolic heart failure (HCC) 8/15/2024 Yes    Type 2 diabetes 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, with long-term current use of insulin (HCC) 8/15/2024 Yes    Essential hypertension 8/15/2024 Yes    Chronic ischemic heart disease 8/15/2024 Yes    ERICK (obstructive sleep apnea) 8/15/2024 Yes    End-stage renal disease needing dialysis ([facility]) 8/15/2024 Yes     Assessment & Plan    Acute on chronic diastolic CHF - improved, management per cardiology and nephrology    NSTEMI - patient placed on a heparin drip and transferred to [facility], Cardiology managing, having a heart cath today, transferred to [facility].     Diabetes - home basal/bolus insulin regimen with additional bolus insulin as needed     ESRD on dialysis - Nephrology managing, dialysis schedule is M,W,F, had dialysis yesterday     Chronic respiratory failure with hypoxia -stable    Uncontrolled back pain - I initiated a transfer request per patient wishes. Transfer denied as Dr. Barrios does not round at [facility], she is not a candidate for urgent surgery and

## 2024-08-20 NOTE — PROGRESS NOTES
Patient returned to room. Post cath recovery initiated. . Patient appears comfortable . Appear to be sleeping.    Arterial line intact to right groin ,good wave from noted.     No drainage or hematoma or drainage noted to right groin.   Patient resting supine in bed.   Side rails up times 2, call light with in reach. John closely moniitored

## 2024-08-20 NOTE — H&P
[facility]  Procedure History and Physical Update          Patient Name: Estefany Garcia  MRN:    [id number]  YOB: 1960  Date of evaluation:  8/20/2024    Procedure:    Cardiac cath +/- PCI    Indication for procedure:  NSTEMI      Please refer to the office note completed by Libby  on 8/19 in the medical record and note that:    [x] I have examined the patient and reviewed the H&P/Consult and there are no changes to be made to the assessment or plan.    [] I have examined the patient and reviewed the H&P/Consult and have noted the following changes:    Past Medical History:   Diagnosis Date    Arthritis     Backache, unspecified     CAD (coronary artery disease)     Cerebral artery occlusion with cerebral infarction (HCC)     CHF (congestive heart failure) (HCC)     Chronic kidney disease     Coronary atherosclerosis of artery bypass graft     COVID 01/31/2022    Cramp of limb     Epistaxis 03/05/2023    Gallstones     Hemodialysis patient (HCC)     MONDAY WEDNESDAY AND FRIDAYS  /  [facility] [place]    Hyperlipidemia     Hypertension     Insomnia     Neuromuscular disorder (HCC)     Pneumonia     Psychiatric problem     Thyroid disease     Type II or unspecified type diabetes mellitus with renal manifestations, not stated as uncontrolled(250.40)     Type II or unspecified type diabetes mellitus without mention of complication, not stated as uncontrolled     Unspecified vitamin D deficiency        Past Surgical History:   Procedure Laterality Date    ANKLE FRACTURE SURGERY      AV FISTULA CREATION  12/14/2021    BACK SURGERY      BREAST REDUCTION SURGERY      BREAST SURGERY      CARDIAC CATHETERIZATION  2005    MVD  /  CT CONSULT    CARDIAC PROCEDURE N/A 11/03/2023    ron / Coronary angiography / op scmh performed by Jairo Hurley MD at [facility] CARDIAC CATH LAB    CARDIAC PROCEDURE N/A 11/15/2023    ron / Percutaneous coronary intervention performed by Jairo Hurley MD at [facility]  2 capsules by mouth in the morning and at bedtime    Provider, Historical, MD   Lancets MISC 1 each by Does not apply route daily 11/30/21   Zulma Payne, APRN - CNP         Vitals:    08/20/24 0845   BP: (!) 121/42   Pulse: 75   Resp: 16   Temp: 98.1 °F (36.7 °C)   SpO2: 100%       Constitutional and General Appearance:   alert, cooperative, no distress and appears stated age  HEENT:  PERRL, EOMI  Respiratory:  Normal excursion and expansion without use of accessory muscles  Resp Auscultation:  Good respiratory effort. No for increased work of breathing.On auscultation: clear to auscultation bilaterally  Cardiovascular:  Regular rate and rhythm.  S1/S2  No murmurs.  The apical impulse is not displaced  Abdomen:  Soft  Bowel sounds present  Non-tender to palpation  Extremities:  No cyanosis or clubbing  Lower extremity edema: No.  Skin:  Warm and dry  Neurological:  Alert and oriented.  Moves all extremities well    Assessment / Acute Cardiac Problems:   -NSTEMI- trops higher than baseline, with some chest pain  -Acute on chronic diastolic CHF  -ESRD- on HD  -MV CAD s/p CABG- Cath 2/23-patent LIMA to LAD, SVG to diagonal, SVG to OM, - s/p PCI SVG-DX on 11/23. Then PTCA only to RCA ISR on 2/24  -Anemia  -Chronic pain- now in back pain- with lose hardware in back, turned down by [facility]    Plan:  Proceed with planned procedure.  Further orders to follow.      Risks, benefits, and alternatives of cardiac catheterization were discussed, in detail, with patient. Risks include, but not limited to, bleeding, requiring blood transfusion, vascular complication requiring surgery, renal failure with need of dialysis, CVA, MI, death and anesthesia complications including intubation were discussed. Patient verbalized understanding and agreed to proceed with the procedure understanding the above risks and alternatives to the procedure.      Pre Procedure Conscious Sedation Data:     ASA Class:                  [] I [] II

## 2024-08-20 NOTE — PLAN OF CARE
Problem: Discharge Planning  Goal: Discharge to home or other facility with appropriate resources  8/19/2024 2221 by Shraddha Chowdhury RN  Outcome: Progressing  Flowsheets (Taken 8/19/2024 2000)  Discharge to home or other facility with appropriate resources:   Identify barriers to discharge with patient and caregiver   Arrange for needed discharge resources and transportation as appropriate     Problem: Safety - Adult  Goal: Free from fall injury  8/19/2024 2221 by Shraddha Chowdhury RN  Outcome: Progressing     Problem: Skin/Tissue Integrity  Goal: Absence of new skin breakdown  Description: 1.  Monitor for areas of redness and/or skin breakdown  2.  Assess vascular access sites hourly  3.  Every 4-6 hours minimum:  Change oxygen saturation probe site  4.  Every 4-6 hours:  If on nasal continuous positive airway pressure, respiratory therapy assess nares and determine need for appliance change or resting period.  8/19/2024 2221 by Shraddha Chowdhury RN  Outcome: Progressing     Problem: ABCDS Injury Assessment  Goal: Absence of physical injury  8/19/2024 2221 by Shraddha Chowdhury RN  Outcome: Progressing     Problem: Chronic Conditions and Co-morbidities  Goal: Patient's chronic conditions and co-morbidity symptoms are monitored and maintained or improved  8/19/2024 2221 by Shraddha Chowdhury RN  Outcome: Progressing  Flowsheets (Taken 8/19/2024 2000)  Care Plan - Patient's Chronic Conditions and Co-Morbidity Symptoms are Monitored and Maintained or Improved:   Monitor and assess patient's chronic conditions and comorbid symptoms for stability, deterioration, or improvement   Collaborate with multidisciplinary team to address chronic and comorbid conditions and prevent exacerbation or deterioration   Update acute care plan with appropriate goals if chronic or comorbid symptoms are exacerbated and prevent overall improvement and discharge     Problem: Pain  Goal: Verbalizes/displays adequate comfort level or baseline

## 2024-08-20 NOTE — PROGRESS NOTES
Patient admitted, consent signed and questions answered. Patient ready for procedure. Call light to reach with side rails up 2 of 2. Bilateral groins clipped with writer and trsvy present.  No family at bedside with patient.  History and physical updated  Patient transferred from [facility] . Chest pain free on admission, but complains of back pain..

## 2024-08-21 LAB
GLUCOSE BLD-MCNC: 101 MG/DL (ref 65–105)
GLUCOSE BLD-MCNC: 109 MG/DL (ref 65–105)
GLUCOSE BLD-MCNC: 151 MG/DL (ref 65–105)
GLUCOSE BLD-MCNC: 197 MG/DL (ref 65–105)
GLUCOSE BLD-MCNC: 271 MG/DL (ref 65–105)
GLUCOSE BLD-MCNC: 98 MG/DL (ref 65–105)

## 2024-08-21 PROCEDURE — 2060000000 HC ICU INTERMEDIATE R&B

## 2024-08-21 PROCEDURE — 97535 SELF CARE MNGMENT TRAINING: CPT

## 2024-08-21 PROCEDURE — 99222 1ST HOSP IP/OBS MODERATE 55: CPT | Performed by: FAMILY MEDICINE

## 2024-08-21 PROCEDURE — 97168 OT RE-EVAL EST PLAN CARE: CPT

## 2024-08-21 PROCEDURE — 99233 SBSQ HOSP IP/OBS HIGH 50: CPT | Performed by: NURSE PRACTITIONER

## 2024-08-21 PROCEDURE — 97165 OT EVAL LOW COMPLEX 30 MIN: CPT

## 2024-08-21 PROCEDURE — 90935 HEMODIALYSIS ONE EVALUATION: CPT

## 2024-08-21 PROCEDURE — 6370000000 HC RX 637 (ALT 250 FOR IP): Performed by: FAMILY MEDICINE

## 2024-08-21 PROCEDURE — 6360000002 HC RX W HCPCS: Performed by: FAMILY MEDICINE

## 2024-08-21 RX ADMIN — INSULIN LISPRO 4 UNITS: 100 INJECTION, SOLUTION INTRAVENOUS; SUBCUTANEOUS at 13:11

## 2024-08-21 RX ADMIN — POTASSIUM CHLORIDE 10 MEQ: 750 CAPSULE, EXTENDED RELEASE ORAL at 10:08

## 2024-08-21 RX ADMIN — BUSPIRONE HYDROCHLORIDE 10 MG: 10 TABLET ORAL at 22:21

## 2024-08-21 RX ADMIN — CYCLOBENZAPRINE 5 MG: 10 TABLET, FILM COATED ORAL at 05:42

## 2024-08-21 RX ADMIN — LIDOCAINE AND PRILOCAINE: 25; 25 CREAM TOPICAL at 10:06

## 2024-08-21 RX ADMIN — CARVEDILOL 3.12 MG: 3.12 TABLET, FILM COATED ORAL at 22:22

## 2024-08-21 RX ADMIN — FENTANYL CITRATE 25 MCG: 0.05 INJECTION, SOLUTION INTRAMUSCULAR; INTRAVENOUS at 10:26

## 2024-08-21 RX ADMIN — MIDODRINE HYDROCHLORIDE 5 MG: 5 TABLET ORAL at 10:29

## 2024-08-21 RX ADMIN — ALLOPURINOL 100 MG: 100 TABLET ORAL at 10:08

## 2024-08-21 RX ADMIN — POTASSIUM CHLORIDE 10 MEQ: 750 CAPSULE, EXTENDED RELEASE ORAL at 22:22

## 2024-08-21 RX ADMIN — SODIUM BICARBONATE 1300 MG: 650 TABLET ORAL at 10:07

## 2024-08-21 RX ADMIN — Medication 9 MG: at 22:22

## 2024-08-21 RX ADMIN — FENTANYL CITRATE 25 MCG: 0.05 INJECTION, SOLUTION INTRAMUSCULAR; INTRAVENOUS at 21:30

## 2024-08-21 RX ADMIN — DOCUSATE SODIUM 100 MG: 100 CAPSULE, LIQUID FILLED ORAL at 22:22

## 2024-08-21 RX ADMIN — BUSPIRONE HYDROCHLORIDE 10 MG: 10 TABLET ORAL at 10:05

## 2024-08-21 RX ADMIN — VENLAFAXINE HYDROCHLORIDE 75 MG: 75 CAPSULE, EXTENDED RELEASE ORAL at 10:08

## 2024-08-21 RX ADMIN — PRASUGREL 10 MG: 10 TABLET, FILM COATED ORAL at 10:08

## 2024-08-21 RX ADMIN — ATORVASTATIN CALCIUM 80 MG: 80 TABLET, FILM COATED ORAL at 22:22

## 2024-08-21 RX ADMIN — HYDROCODONE BITARTRATE AND ACETAMINOPHEN 1 TABLET: 5; 325 TABLET ORAL at 18:17

## 2024-08-21 RX ADMIN — HYDROCODONE BITARTRATE AND ACETAMINOPHEN 1 TABLET: 5; 325 TABLET ORAL at 13:11

## 2024-08-21 RX ADMIN — DOCUSATE SODIUM 100 MG: 100 CAPSULE, LIQUID FILLED ORAL at 10:05

## 2024-08-21 RX ADMIN — EPOETIN ALFA-EPBX 3000 UNITS: 3000 INJECTION, SOLUTION INTRAVENOUS; SUBCUTANEOUS at 22:21

## 2024-08-21 RX ADMIN — INSULIN GLARGINE 72 UNITS: 100 INJECTION, SOLUTION SUBCUTANEOUS at 10:04

## 2024-08-21 RX ADMIN — ASPIRIN 81 MG: 81 TABLET, CHEWABLE ORAL at 10:08

## 2024-08-21 RX ADMIN — SODIUM BICARBONATE 1300 MG: 650 TABLET ORAL at 22:22

## 2024-08-21 RX ADMIN — FENTANYL CITRATE 25 MCG: 0.05 INJECTION, SOLUTION INTRAMUSCULAR; INTRAVENOUS at 05:42

## 2024-08-21 RX ADMIN — FERROUS SULFATE TAB 325 MG (65 MG ELEMENTAL FE) 325 MG: 325 (65 FE) TAB at 10:08

## 2024-08-21 RX ADMIN — FENTANYL CITRATE 25 MCG: 0.05 INJECTION, SOLUTION INTRAMUSCULAR; INTRAVENOUS at 16:59

## 2024-08-21 RX ADMIN — INSULIN GLARGINE 42 UNITS: 100 INJECTION, SOLUTION SUBCUTANEOUS at 22:21

## 2024-08-21 RX ADMIN — CYCLOBENZAPRINE 5 MG: 10 TABLET, FILM COATED ORAL at 17:04

## 2024-08-21 ASSESSMENT — PAIN DESCRIPTION - LOCATION
LOCATION: BACK

## 2024-08-21 ASSESSMENT — PAIN DESCRIPTION - ORIENTATION
ORIENTATION: RIGHT
ORIENTATION: MID
ORIENTATION: MID;LOWER
ORIENTATION: MID

## 2024-08-21 ASSESSMENT — PAIN SCALES - GENERAL
PAINLEVEL_OUTOF10: 10
PAINLEVEL_OUTOF10: 4
PAINLEVEL_OUTOF10: 10
PAINLEVEL_OUTOF10: 0
PAINLEVEL_OUTOF10: 0
PAINLEVEL_OUTOF10: 3
PAINLEVEL_OUTOF10: 9
PAINLEVEL_OUTOF10: 10
PAINLEVEL_OUTOF10: 10
PAINLEVEL_OUTOF10: 0

## 2024-08-21 ASSESSMENT — PAIN DESCRIPTION - FREQUENCY
FREQUENCY: INTERMITTENT
FREQUENCY: INTERMITTENT

## 2024-08-21 ASSESSMENT — PAIN - FUNCTIONAL ASSESSMENT
PAIN_FUNCTIONAL_ASSESSMENT: PREVENTS OR INTERFERES SOME ACTIVE ACTIVITIES AND ADLS
PAIN_FUNCTIONAL_ASSESSMENT: PREVENTS OR INTERFERES WITH MANY ACTIVE NOT PASSIVE ACTIVITIES
PAIN_FUNCTIONAL_ASSESSMENT: PREVENTS OR INTERFERES WITH MANY ACTIVE NOT PASSIVE ACTIVITIES

## 2024-08-21 ASSESSMENT — PAIN DESCRIPTION - DESCRIPTORS
DESCRIPTORS: STABBING
DESCRIPTORS: SPASM
DESCRIPTORS: SPASM;ACHING
DESCRIPTORS: STABBING
DESCRIPTORS: ACHING
DESCRIPTORS: ACHING

## 2024-08-21 NOTE — PROGRESS NOTES
HEMODIALYSIS POST TREATMENT NOTE    Treatment time ordered: 210Mins    Actual treatment time: 210min    UltraFiltration Goal: 3000  UltraFiltration Removed: 3014      Pre Treatment weight: 120.3  Post Treatment weight: 117.8  Estimated Dry Weight: 116    Access used:     Central Venous Catheter:          Tunneled or Non-tunneled: na           Site: na          Access Flow: na      Internal Access:       AV Fistula or AV Graft: AVF          Site: ROBIN       Access Flow: good       Sign and symptoms of infection: na       If YES: na    Medications or blood products given: see MAR    Chronic outpatient schedule: [facility]    Chronic outpatient unit: [facility]    Summary of response to treatment: Patient treatment ended, blood returned lines clamped. over 10 minutes to clot. bruit and thrill present. 500ml saline given 3014 ml total fluid removed. VSS saftey checks complete.         ACES flowsheet faxed to patient unit/ placed in patient chart: yes    Post assessment completed: yes by ANG Mccloud    Report given to: Ang Tarango      * Intra-treatment documented Safety Checks include the followin) Access and face visible at all times.     2) All connections and blood lines are secure with no kinks.     3) NVL alarm engaged.     4) Hemosafe device applied (if applicable).     5) No collapse of Arterial or Venous blood chambers.     6) All blood lines / pump segments in the air detectors.

## 2024-08-21 NOTE — DISCHARGE INSTR - COC
Continuity of Care Form    Patient Name: Estefany Garcia   :  1960  MRN:  [id number]    Admit date:  2024  Discharge date:  2024    Code Status Order: Full Code   Advance Directives:   Advance Care Flowsheet Documentation             Admitting Physician:  Niesha Sunshine MD  PCP: Zulma Payne, APRN - CNP    Discharging Nurse: katie  [facility] Unit/Room#: 2124/2124-01  Discharging Unit Phone Number: [phone]    Emergency Contact:   Extended Emergency Contact Information  Primary Emergency Contact: Hailey Guido  Home Phone: [phone]  Relation: Brother/Sister  Secondary Emergency Contact: Nancy Perdomo  Home Phone: [phone]  Relation: Brother/Sister    Past Surgical History:  Past Surgical History:   Procedure Laterality Date    ANKLE FRACTURE SURGERY      AV FISTULA CREATION  2021    BACK SURGERY      BREAST REDUCTION SURGERY      BREAST SURGERY      CARDIAC CATHETERIZATION      MVD  /  CT CONSULT    CARDIAC PROCEDURE N/A 2023    ron / Coronary angiography / op scmh performed by Jairo Ceja MD at [facility] CARDIAC CATH LAB    CARDIAC PROCEDURE N/A 11/15/2023    ron / Percutaneous coronary intervention performed by Jairo Ceja MD at [facility] CARDIAC CATH LAB    CARDIAC PROCEDURE N/A 2024    ron / Left heart cath / op scmh performed by Jairo Ceja MD at [facility] CARDIAC CATH LAB    CARDIAC PROCEDURE N/A 2024    Percutaneous coronary intervention performed by Jairo Ceja MD at [facility] CARDIAC CATH LAB    CARDIAC SURGERY      CARPAL TUNNEL RELEASE      CHOLECYSTECTOMY, OPEN N/A     COLONOSCOPY      CORONARY ANGIOPLASTY WITH STENT PLACEMENT  2023    PTCA / FADIA RCA    CORONARY ANGIOPLASTY WITH STENT PLACEMENT  2019    STENT X1 AT [facility]    CORONARY ANGIOPLASTY WITH STENT PLACEMENT  2023    DR CEJA  /  FADIA SVG-DIAG  /  NEEDS RCA DONE    CORONARY ARTERY BYPASS GRAFT  02/2005    X3 [facility]    DIALYSIS CATHETER INSERTION    Administered Date(s) Administered    COVID-19, MODERNA Booster BLUE border, (age 18y+), IM, 50mcg/0.25mL 05/12/2022    COVID-19, PFIZER PURPLE top, DILUTE for use, (age 12 y+), 30mcg/0.3mL 03/19/2021, 04/23/2021    Influenza Virus Vaccine 10/18/2018, 09/10/2019, 11/03/2020    Influenza, AFLURIA (age 3 y+), FLUZONE, (age 6 mo+), Quadv MDV, 0.5mL 10/16/2017    Influenza, FLUARIX, FLULAVAL, FLUZONE (age 6 mo+) and AFLURIA, (age 3 y+), Quadv PF, 0.5mL 10/18/2018, 09/10/2019, 09/20/2019, 11/03/2020    Influenza, FLUCELVAX, (age 6 mo+), MDCK, Quadv PF, 0.5mL 09/30/2021    Pneumococcal, PCV-13, PREVNAR 13, (age 6w+), IM, 0.5mL 08/06/2019    Pneumococcal, PPSV23, PNEUMOVAX 23, (age 2y+), SC/IM, 0.5mL 10/30/2014    TDaP, ADACEL (age 10y-64y), BOOSTRIX (age 10y+), IM, 0.5mL 11/18/2017, 06/09/2024    Zoster Recombinant (Shingrix) 09/23/2022, 01/10/2023       Active Problems:  Patient Active Problem List   Diagnosis Code    Atherosclerosis of coronary artery bypass graft of native heart with angina pectoris ([facility]) I25.709    Acute kidney injury superimposed on CKD ([facility]) N17.9, N18.9    Chronic diastolic heart failure ([facility]) I50.32    Diabetic polyneuropathy associated with type 2 diabetes mellitus ([facility]) E11.42    History of coronary artery bypass graft Z95.1    Iron deficiency anemia D50.9    Spinal stenosis of lumbar region with neurogenic claudication M48.062    Mixed hyperlipidemia E78.2    CKD (chronic kidney disease) stage 4, GFR 15-29 ml/min ([facility]) N18.4    Type 2 diabetes 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, with long-term current use of insulin (HCC) E11.22, N18.6, Z99.2, Z79.4    Obesity, Class II, BMI 35-39.9 E66.9    Thyroid nodule greater than or equal to 1 cm in diameter incidentally noted on imaging study E04.1    Essential hypertension I10    Anemia of chronic disease D63.8    Chronic ischemic heart disease I25.9    Ischemic stroke of frontal lobe (HCC) I63.9    Morbid obesity with BMI of

## 2024-08-21 NOTE — PROGRESS NOTES
Physical Therapy        Physical Therapy Cancel Note      DATE: 2024    NAME: Estefany Garcia  MRN: [id number]   : 1960      Patient not seen this date for Physical Therapy due to:    Hemodialysis:     2 attempts made, @ 13.20P:M and at 15:00P:M. Pt still has not returned to her room form dialysis. Will attempt tomorrow.   DEDE Tarango notified.   Electronically signed by Demian Rios PT on 2024 at 3:06 PM

## 2024-08-21 NOTE — H&P
Family Medicine Admit Note    PCP: Zulma Payne APRN - CNP    Date of Admission: 8/20/2024    Date of Service: Pt seen/examined on 8/21/2024 and Admitted to Inpatient     Chief Complaint:  back pain/spasm      History Of Present Illness:   The patient is a 64 y.o. female who presents to [facility] with increased right sided back pain for 4 days; she has a history of back surgery and had appointment to see Dr. Chu today about a possible hardware fracture.     This morning, she states she has some shortness of breath as well as the back pain that comes and goes. Her sister cancelled her appointment with Dr. Chu.    Interval HPI:  The patient had a NSTEMI on 8/18/24. She was transferred to [facility] for a cardiac cath. She had angioplasty with 1 stent placed. She will need to be on Effient for 6 weeks uninterrupted before any corrective surgery can be considered. Patient sleeping while receiving dialysis.     Past Medical History:        Diagnosis Date    Arthritis     Backache, unspecified     CAD (coronary artery disease)     Cerebral artery occlusion with cerebral infarction (HCC)     CHF (congestive heart failure) (HCC)     Chronic kidney disease     Coronary atherosclerosis of artery bypass graft     COVID 01/31/2022    Cramp of limb     Epistaxis 03/05/2023    Gallstones     Hemodialysis patient ([facility])     MONDAY WEDNESDAY AND FRIDAYS  /  [facility] IN [place]    Hyperlipidemia     Hypertension     Insomnia     Neuromuscular disorder (HCC)     Pneumonia     Psychiatric problem     Thyroid disease     Type II or unspecified type diabetes mellitus with renal manifestations, not stated as uncontrolled(250.40)     Type II or unspecified type diabetes mellitus without mention of complication, not stated as uncontrolled     Unspecified vitamin D deficiency        Past Surgical History:        Procedure Laterality Date    ANKLE FRACTURE SURGERY      AV FISTULA CREATION  12/14/2021    BACK SURGERY       11/30/21   Zulma Payne, APRN - CNP       Allergies:  Adhesive tape, Metformin and related, Ace inhibitors, Iv dye [iodides], Nsaids, Metformin and related, and Silicone    Social History:  The patient currently lives at [facility]    TOBACCO:   reports that she has never smoked. She has never used smokeless tobacco.  ETOH:   reports no history of alcohol use.      Family History:          Problem Relation Age of Onset    Diabetes Father     Heart Failure Father      ROS: sob, back pain    PHYSICAL EXAM:    BP (!) 107/50   Pulse 85   Temp 97.5 °F (36.4 °C)   Resp 20   Ht 1.651 m (5' 5\")   Wt 120.3 kg (265 lb 3.4 oz)   SpO2 99%   BMI 44.13 kg/m²     General appearance: No apparent distress appears stated age and cooperative.  HEENT Normal cephalic, atraumatic without obvious deformity.    Neck: Supple, No jugular venous distention/bruits.    Lungs: Clear to auscultation, bilaterally without rales/wheezes/rhonchi with good respiratory effort.  Heart: Regular rate and rhythm with Normal S1/S2 without murmurs, rubs or gallops  Mental status: Alert, oriented, thought content appropriate.    CXR:  I have reviewed the CXR with the following interpretation: Cardiomegaly with pulmonary vascular congestion   EKG:  I have reviewed the EKG with the following interpretation: NSR    CBC   Recent Labs     08/18/24  0735 08/19/24  0550   WBC 6.4 7.7   HGB 9.6* 9.3*   HCT 30.0* 28.3*    171      RENAL  Recent Labs     08/18/24  0735 08/19/24  0550   * 129*   K 4.0 4.4   CL 90* 90*   CO2 25 24   BUN 32* 38*   CREATININE 5.1* 5.4*     LFT'S  No results for input(s): \"AST\", \"ALT\", \"BILIDIR\", \"BILITOT\", \"ALKPHOS\" in the last 72 hours.    Invalid input(s): \"ALB\"    COAG  No results for input(s): \"INR\" in the last 72 hours.    CARDIAC ENZYMES  No results for input(s): \"CKTOTAL\", \"CKMB\", \"CKMBINDEX\", \"TROPONINI\" in the last 72 hours.    U/A:    Lab Results   Component Value Date/Time    COLORU Yellow 06/11/2024 12:00 PM     WBCUA TOO NUMEROUS TO COUNT 06/11/2024 12:00 PM    RBCUA 0 TO 2 06/11/2024 12:00 PM    MUCUS 1+ 02/07/2023 03:00 PM    BACTERIA MANY 06/11/2024 12:00 PM    LEUKOCYTESUR LARGE 06/11/2024 12:00 PM    GLUCOSEU NEGATIVE 06/11/2024 12:00 PM    AMORPHOUS 1+ 02/25/2023 03:15 AM       ABG    Lab Results   Component Value Date/Time    RES6GRI 30.4 04/24/2022 01:42 PM    I8FFOGSJ 82.8 04/24/2022 01:42 PM    PHART 7.429 04/24/2022 01:42 PM    JCB6JZY 45.9 04/24/2022 01:42 PM    PO2ART 47.6 04/24/2022 01:42 PM           Active Hospital Problems    Diagnosis Date Noted    NSTEMI (non-ST elevated myocardial infarction) (HCC) [I21.4] 10/30/2023         ASSESSMENT/PLAN:    Pulmonary congestion likely related to chronic diastolic CHF and ESRD - cardiology and nephrology following    Diabetes - home basal/bolus insulin regimen with additional bolus insulin as needed    ESRD on HD    Chronic respiratory failure with hypoxia -stable    DVT Prophylaxis: heparin subcutaneous  Diet: ADULT DIET; Regular; 4 carb choices (60 gm/meal)  Code Status: Full Code    Time spent in pre-visit planning, interview, exam, /education and coordination of care: 56 minutes.    Dispo - admitted      Electronically signed by Niesha Sunshine MD on 8/21/2024 at 6:03 AM

## 2024-08-21 NOTE — PROGRESS NOTES
Department of Internal Medicine  Nephrology Aneudy Jurado MD Progress Note    Reason for consultation: Management of hemodialysis dependent End-stage renal disease.    Consulting physician: Madonna Aviles MD.      Interval history:   Patient was seen and examined today at dialysis unit, patient went to [facility] for cardiac catheterization which showed patent lima to LAD, SVG to D1 proximal body 70% stenosis unchanged from last cath and patent SVG to IMI with post anastomotic severe mismatch with vessel being 1 mm in caliber with 82% 90% stenosis.  Known occluded ostial LAD and proximal left circumflex.  Severe distal RCA in-stent stenosis along with severe mid RPDA in-stent stent stent restenosis, successful balloon angioplasty only of RPDA/RCA  Patient had dialysis this morning tolerated well    History of present illness: This is a 64 y.o. female with a significant past medical history of Systemic hypertension, osteoarthritis,  coronary artery disease [s/p CABG with subsequent graft stent], s/p cerebrovascular accident, type 2 diabetes mellitus and End-stage renal disease secondary to hypertensive and diabetic nephropathy [on routine hemodialysis Monday/Wednesday/Friday at [facility] dialysis unit [facility] urinary care of Dr. Graham using left arm AV fistula], who presented to the emergency department at [facility]  8/024 with complaints of right-sided mid back pain.  She has a history of spine surgery in 2001 and had been to the [facility] ED on 8/13/2024 with CT scan of the thoracic and lumbar spine suggesting possible small hardware fracture and she was scheduled to follow-up with her spine surgeon, Dr. Pederson on 8/15/2024    Blood pressure at presentation was 90/61 mmHg. Laboratory studies at presentation remarkable for BUN/creatinine 37/5.8 mg/dL.  She did receive acute hemodialysis yesterday evening after admission.  She feels better today.  Laboratory studies today  potassium,1500 ml fluid restriction,1-gram phosphorus, 1800 KCal and 1.2 gram/kg protein per day.    2.  Back pain - follow-up with spine surgeon. Continue pain management.   Patient is required to stand transfer to [facility] but Dr. Pederson, her spine surgeon does not go to [place]    3.  Systemic hypertension - blood pressure is adequately controlled.    4.  Anemia of chronic kidney disease - hemoglobin today is stable at 9.6 g/dL.  LOLITA per protocol.     5.  Hyponatremia - likely secondary to pain induced SIADH.  Will continue fluid restriction 1500 mL/day and optimize pain control.    6.  Coronary artery disease status post left heart catheterization status post PCI of RPDA/RCA yesterday on 8/20/2024    Plan  HD MWF, had dialysis this morning  Continue current treatment    Prognosis is guarded.      Aneudy Jurado MD   Attending Nephrologist  8/21/2024 3:39 PM

## 2024-08-21 NOTE — PROGRESS NOTES
[facility]    Date: 2024  Patient Name: Estefany Garcia        : 1960       [] Pt Refusal           [x] Pt Unavailable due to:     Pt is in dialysis, will attempt eval later this pm when pt returns to room.       Melia Carbone, OT,   Date: 2024

## 2024-08-21 NOTE — CARE COORDINATION
ONGOING DISCHARGE PLAN:    Patient is alert and oriented x4.    Spoke with patient regarding discharge plan and patient confirms that plan is still to return to [facility].     Writer spoke to Yodit at [place], as of this time. Auth is still pending. Per Previous [facility] notes, Auth was started on 8/16/24.     Pt. Had Cardiac Cath Yesterday at [facility].     Pt. Follows at [facility] M/W/F at 10:15 AM. Nephro on board.  Will get Dialysis today.     PT/OT on board.     Will continue to follow for additional discharge needs.    If patient is discharged prior to next notation, then this note serves as note for discharge by case management.    Electronically signed by Kaley Dee RN on 8/21/2024 at 12:03 PM

## 2024-08-21 NOTE — PROGRESS NOTES
[facility]   Occupational Therapy Evaluation  Date: 24  Patient Name: Estefany Garcia       Room: 44-01  MRN: [id number]  Account: [id number]   : 1960  (64 y.o.) Gender: female     Discharge Recommendations:  Further Occupational Therapy is recommended upon facility discharge.    OT Equipment Recommendations  Other: TBD       Diagnosis: NSTEMI, cardiac cath with PCI 24.  ESRD; Fluid overload; Acute exacerbation of chronic back pain.   Additional Pertinent Hx: NSTEMI- trops higher than baseline, with some chest pain  -Acute on chronic diastolic CHF  -ESRD- on HD  -MV CAD s/p CABG- Cath -patent LIMA to LAD, SVG to diagonal, SVG to OM, - s/p PCI SVG-DX on . Then PTCA only to RCA ISR on . Now post PCI 24: only to RPDA/RCA. Acute exacerbation of chronic back pain. per chart pt fell when at home 2024.         Past Medical History:  has a past medical history of Arthritis, Backache, unspecified, CAD (coronary artery disease), Cerebral artery occlusion with cerebral infarction ([facility]), CHF (congestive heart failure) ([facility]), Chronic kidney disease, Coronary atherosclerosis of artery bypass graft, COVID, Cramp of limb, Epistaxis, Gallstones, Hemodialysis patient ([facility]), Hyperlipidemia, Hypertension, Insomnia, Neuromuscular disorder ([facility]), Pneumonia, Psychiatric problem, Thyroid disease, Type II or unspecified type diabetes mellitus with renal manifestations, not stated as uncontrolled(250.40), Type II or unspecified type diabetes mellitus without mention of complication, not stated as uncontrolled, and Unspecified vitamin D deficiency.    Past Surgical History:   has a past surgical history that includes Coronary artery bypass graft (2005); Knee arthroscopy; Carpal tunnel release; Breast surgery; Tonsillectomy; Hand surgery; Ankle fracture surgery; Cholecystectomy, open (N/A); IR TUNNELED CVC PLACE WO SQ PORT/PUMP > 5 YEARS (2021); AV fistula creation  Safety education & training, Patient/Caregiver education & training, Equipment evaluation, education, & procurement, Self-Care / ADL, Home management training      OT Individual Minutes  OT Individual Minutes  Time In: 1524  Time Out: 1556  Minutes: 32           Electronically signed by Melia Carbone OT on 8/21/24 at 4:42 PM EDT

## 2024-08-21 NOTE — CONSULTS
[facility]    Consult                        Today's Date: 8/21/2024  Patient Name: Estefany Garcia  Date of admission: 8/20/2024  7:27 PM  Patient's age: 64 y.o., 1960  Admission Dx: NSTEMI (non-ST elevated myocardial infarction) (HCC) [I21.4]    Reason for Consult:  Cardiac evaluation    Requesting Physician: Niesha Sunshine MD    CHIEF COMPLAINT:  CAD. Returned from PCI at [facility]    History Obtained From:  patient, electronic medical record    HISTORY OF PRESENT ILLNESS:      The patient is a 64 y.o.  female who is admitted to the hospital for CAD.    Initially presented to [facility] 8/15 with CHF exacerbation. Trops elevated and had chest pain. Called NSTEMI and tx for [facility] 8/20/24 as below    Past Medical History:   has a past medical history of Arthritis, Backache, unspecified, CAD (coronary artery disease), Cerebral artery occlusion with cerebral infarction (HCC), CHF (congestive heart failure) (HCC), Chronic kidney disease, Coronary atherosclerosis of artery bypass graft, COVID, Cramp of limb, Epistaxis, Gallstones, Hemodialysis patient (HCC), Hyperlipidemia, Hypertension, Insomnia, Neuromuscular disorder (HCC), Pneumonia, Psychiatric problem, Thyroid disease, Type II or unspecified type diabetes mellitus with renal manifestations, not stated as uncontrolled(250.40), Type II or unspecified type diabetes mellitus without mention of complication, not stated as uncontrolled, and Unspecified vitamin D deficiency.    Past Surgical History:   has a past surgical history that includes Coronary artery bypass graft (02/2005); Knee arthroscopy; Carpal tunnel release; Breast surgery; Tonsillectomy; Hand surgery; Ankle fracture surgery; Cholecystectomy, open (N/A); IR TUNNELED CVC PLACE WO SQ PORT/PUMP > 5 YEARS (08/18/2021); AV fistula creation (12/14/2021); Dialysis fistula creation (Left, 12/14/2021); other surgical history (04/06/2022); back surgery; Colonoscopy; eye surgery; fracture    potassium chloride (MICRO-K) 10 MEQ extended release capsule Take 1 capsule by mouth 2 times daily    Danay Sun MD   lidocaine-prilocaine (EMLA) 2.5-2.5 % cream Apply topically as needed for Pain Apply topically as needed. 7/16/24   Zulma Payne APRN - CNP   carvedilol (COREG) 3.125 MG tablet Take 1 tablet by mouth 2 times daily 7/9/24   Sergio Gill DO   BASAGLAR KWIKPEN 100 UNIT/ML injection pen INJECT 72 UNITS SUBCUTANEOUSLY IN THE MORNING AND 42 AT BEDTIME 6/20/24   Zulma Payne APRN - CNP   epoetin raphael (PROCRIT) 3000 UNIT/ML injection Inject 1 mL into the skin Every Monday, Wednesday, and Friday 6/17/24   Niesha Sunshine MD   atorvastatin (LIPITOR) 80 MG tablet Take 1 tablet by mouth nightly 5/1/24   Vlad Beard MD   cyclobenzaprine (FLEXERIL) 5 MG tablet Take 1 tablet by mouth three times daily as needed for muscle spasm 4/22/24   Zulma Payne APRN - CNP   FEROSUL 325 (65 Fe) MG tablet Take 1 tablet by mouth daily 3/15/24   ProviderDanay MD   sodium bicarbonate 650 MG tablet Take 2 tablets by mouth twice daily 2/20/24   Jair Darnell MD   allopurinol (ZYLOPRIM) 100 MG tablet Take 1 tablet by mouth daily 2/9/24   Zulma Payne APRN - CNP   docusate sodium (COLACE) 100 MG capsule Take 1 capsule by mouth 2 times daily 2/6/24 1/31/25  Zulma Payne APRN - CNP   insulin aspart (NOVOLOG FLEXPEN) 100 UNIT/ML injection pen Inject 20 Units into the skin 3 times daily (before meals) Inject 20 units into the skin 3 times daily, before meals.  Additionally sliding scale coverage as needed 3 times a day: for blood sugar  no additional insulin.  Sugar 140-199 give 3 units.  Sugar 200-249 give 6 units.  Sugar 250-299 give 9 units.  Sugar 300-3 49 give 12 units.  Sugar 3  give 15 units.  Sugar over 400 give 18 units.    Maximum allowable amount 103 units daily 1/25/24 7/1/24  Zulma Payne, APRN - CNP   sevelamer (RENVELA) 800 MG tablet Take 2 tablets by mouth 3 times  daily (with meals) 1/25/24 7/1/24  Zulma Payne APRN - CNP   Magnesium 400 MG CAPS Take 1 capsule by mouth daily 1/25/24 7/1/24  Zulma Payne APRN - CNP   venlafaxine (EFFEXOR XR) 75 MG extended release capsule TAKE 1 CAPSULE BY MOUTH ONCE DAILY WITH BREAKFAST 11/28/23   Zulma Payne APRN - CNP   prasugrel (EFFIENT) 10 MG TABS Take 1 tablet by mouth daily 11/4/23   Erik Dey MD   polyvinyl alcohol (LIQUIFILM TEARS) 1.4 % ophthalmic solution Place 1 drop into both eyes as needed for Dry Eyes    Danay Sun MD   Insulin Pen Needle (EASY TOUCH PEN NEEDLES) 29G X 12MM MISC 5 each by Does not apply route daily 10/20/23 11/19/23  Zulma Payne APRN - CNP   nitroGLYCERIN (NITROSTAT) 0.4 MG SL tablet Place 1 tablet under the tongue every 5 minutes as needed for Chest pain up to max of 3 total doses. If no relief after 1 dose, call 911. 10/12/23   Zulma Payne APRN - CNP   Continuous Blood Gluc  (DEXCOM G6 ) KODY 1 each by Does not apply route 4 times daily 9/15/23   Zulma Payne APRN - CNP   aspirin 81 MG chewable tablet Take 1 tablet by mouth daily 9/15/23   Zulma Payne APRN - CNP   acetaminophen (TYLENOL) 325 MG tablet Take 2 tablets by mouth every 6 hours as needed for Pain 9/15/23   Zulma Payne APRN - CNP   busPIRone (BUSPAR) 10 MG tablet Take 1 tablet by mouth 3 times daily 9/15/23   Zulma Payne APRN - CNP   midodrine (PROAMATINE) 5 MG tablet Take 1 tablet by mouth as needed (up to 2 tablets as needed for SBP <100 during dialysis)    Danay Sun MD   Melatonin 10 MG TABS Take 10 mg by mouth nightly as needed (for sleep) Indications: Trouble Sleeping    Danay Sun MD   Calcium Polycarbophil (FIBER-CAPS PO) Take 2 capsules by mouth in the morning and at bedtime    Danay Sun MD   Lancets MISC 1 each by Does not apply route daily 11/30/21   Zulma Payne, APRN - CNP       Allergies:  Adhesive tape, Metformin and related, Ace inhibitors,  heart failure, unspecified heart failure type ([facility])    Diarrhea of presumed infectious origin    Epistaxis    Chronic respiratory failure with hypoxia ([facility])    Chest pain    Hemodialysis catheter dysfunction ([facility])    Dialysis AV fistula malfunction ([facility])    ESRD (end stage renal disease) (HCC)    ESRD (end stage renal disease) on dialysis ([facility])    Hypokalemia    ERICK (obstructive sleep apnea)    AMS (altered mental status)    Unstable angina ([facility])    Acute electrocardiogram changes    Renovascular hypertension    Episodic confusion    Chest pain with high risk for cardiac etiology    NSTEMI (non-ST elevated myocardial infarction) ([facility])    Angina at rest ([facility])    S/P angioplasty with stent    H/O heart artery stent    Abdominal distension    Abnormal stress test    Status post cardiac catheterization    Hx of type 2 diabetes mellitus    Bilateral edema of lower extremity    Closed fracture of right distal radius and ulna, initial encounter    Closed fracture of right wrist    End-stage renal disease needing dialysis ([facility])    Pulmonary congestion    Acute coronary syndrome ([facility])     -NSTEMI- trops higher than baseline, with some chest pain  -Acute on chronic diastolic CHF  -ESRD- on HD  -MV CAD s/p CABG- Cath 2/23-patent LIMA to LAD, SVG to diagonal, SVG to OM, - s/p PCI SVG-DX on 11/23. Then PTCA only to RCA ISR on 2/24. Now post PCI only to RPDA/RCA  -Anemia  -Chronic pain- now in back pain- with lose hardware in back, turned down by [facility]    RECOMMENDATIONS:  Stable post cath as above. Continue PO ASA, statin, BB, & Effient. Will need at least 6 weeks of uninterrupted Effient prior to any surgical interventions for her back pain.   CHF stable. Continue PO Bumex 3mg on non-HD days   Appreciate nephrology assistance with volume management  Management of back pain per primary/f/u OP with surgeon  Keep K >4 and Mg >2  Discharge planning per primary team      Discussed with patient and Nurse    Kera IRVIN

## 2024-08-21 NOTE — PROGRESS NOTES
HEMODIALYSIS PRE-TREATMENT NOTE    Patient Identifiers prior to treatment:  MRN NAME    Isolation Required: yes                      Isolation Type: contact       (please document if patient is being managed as a PUI/COVID-19 patient)        Hepatitis status:                           Date Drawn                             Result  Hepatitis B Surface Antigen 24 neg        Hepatitis B Surface Antibody 3/18/24 1000        Hepatitis B Core Antibody 3/18/24 negf          How was Hepatitis Status verified: epic     Was a copy of the labs you documented provided to facility for the patient's chart: yes    Hemodialysis orders verified: yes    Access Within normal limits ( I.e. s/s of infection,...): yes     Pre-Assessment completed: yes by ANG Mae    Pre-dialysis report received from: Ang Tarango                 Time: 1000

## 2024-08-21 NOTE — ACP (ADVANCE CARE PLANNING)
Advance Care Planning     Advance Care Planning Activator (Inpatient)  Conversation Note      Date of ACP Conversation: 8/21/2024     Conversation Conducted with: Patient with Decision Making Capacity    ACP Activator: Kaley Dee RN        Health Care Decision Maker:     Current Designated Health Care Decision Maker:     Primary Decision Maker: HayHailey - Brother/Sister - [phone]    Secondary Decision Maker: Nancy Perdomo - Brother/Sister - [phone]        Care Preferences    Ventilation:  \"If you were in your present state of health and suddenly became very ill and were unable to breathe on your own, what would your preference be about the use of a ventilator (breathing machine) if it were available to you?\"      Would the patient desire the use of ventilator (breathing machine)?: yes    \"If your health worsens and it becomes clear that your chance of recovery is unlikely, what would your preference be about the use of a ventilator (breathing machine) if it were available to you?\"     Would the patient desire the use of ventilator (breathing machine)?: Yes      Resuscitation  \"CPR works best to restart the heart when there is a sudden event, like a heart attack, in someone who is otherwise healthy. Unfortunately, CPR does not typically restart the heart for people who have serious health conditions or who are very sick.\"    \"In the event your heart stopped as a result of an underlying serious health condition, would you want attempts to be made to restart your heart (answer \"yes\" for attempt to resuscitate) or would you prefer a natural death (answer \"no\" for do not attempt to resuscitate)?\" yes       [] Yes   [] No   Educated Patient / Decision Maker regarding differences between Advance Directives and portable DNR orders.    Length of ACP Conversation in minutes:      Conversation Outcomes:  ACP discussion completed    Follow-up plan:    [] Schedule follow-up conversation to continue  planning  [] Referred individual to Provider for additional questions/concerns   [] Advised patient/agent/surrogate to review completed ACP document and update if needed with changes in condition, patient preferences or care setting    [] This note routed to one or more involved healthcare providers

## 2024-08-21 NOTE — PLAN OF CARE
Problem: Safety - Adult  Goal: Free from fall injury  Outcome: Progressing     Problem: Discharge Planning  Goal: Discharge to home or other facility with appropriate resources  Outcome: Progressing  Flowsheets (Taken 8/20/2024 2057)  Discharge to home or other facility with appropriate resources:   Identify barriers to discharge with patient and caregiver   Arrange for needed discharge resources and transportation as appropriate   Identify discharge learning needs (meds, wound care, etc)     Problem: Pain  Goal: Verbalizes/displays adequate comfort level or baseline comfort level  Outcome: Progressing     Problem: Skin/Tissue Integrity  Goal: Absence of new skin breakdown  Description: 1.  Monitor for areas of redness and/or skin breakdown  2.  Assess vascular access sites hourly  3.  Every 4-6 hours minimum:  Change oxygen saturation probe site  4.  Every 4-6 hours:  If on nasal continuous positive airway pressure, respiratory therapy assess nares and determine need for appliance change or resting period.  Outcome: Progressing

## 2024-08-22 PROBLEM — Z86.79 HX OF CORONARY ARTERY DISEASE: Status: ACTIVE | Noted: 2024-08-22

## 2024-08-22 LAB
ALBUMIN SERPL-MCNC: 3.8 G/DL (ref 3.5–5.2)
ALP SERPL-CCNC: 180 U/L (ref 35–104)
ALT SERPL-CCNC: 17 U/L (ref 5–33)
ANION GAP SERPL CALCULATED.3IONS-SCNC: 15 MMOL/L (ref 9–17)
AST SERPL-CCNC: 22 U/L
BILIRUB SERPL-MCNC: 0.6 MG/DL (ref 0.3–1.2)
BUN SERPL-MCNC: 27 MG/DL (ref 8–23)
CALCIUM SERPL-MCNC: 8.8 MG/DL (ref 8.6–10.4)
CHLORIDE SERPL-SCNC: 95 MMOL/L (ref 98–107)
CO2 SERPL-SCNC: 24 MMOL/L (ref 20–31)
CREAT SERPL-MCNC: 4.7 MG/DL (ref 0.5–0.9)
ERYTHROCYTE [DISTWIDTH] IN BLOOD BY AUTOMATED COUNT: 19.5 % (ref 11.5–14.9)
GFR, ESTIMATED: 10 ML/MIN/1.73M2
GLUCOSE BLD-MCNC: 144 MG/DL (ref 65–105)
GLUCOSE BLD-MCNC: 155 MG/DL (ref 65–105)
GLUCOSE BLD-MCNC: 195 MG/DL (ref 65–105)
GLUCOSE BLD-MCNC: 204 MG/DL (ref 65–105)
GLUCOSE BLD-MCNC: 238 MG/DL (ref 65–105)
GLUCOSE SERPL-MCNC: 153 MG/DL (ref 70–99)
HCT VFR BLD AUTO: 29.2 % (ref 36–46)
HGB BLD-MCNC: 9.2 G/DL (ref 12–16)
MCH RBC QN AUTO: 38.1 PG (ref 26–34)
MCHC RBC AUTO-ENTMCNC: 31.4 G/DL (ref 31–37)
MCV RBC AUTO: 121.2 FL (ref 80–100)
PLATELET # BLD AUTO: 131 K/UL (ref 150–450)
PMV BLD AUTO: 9 FL (ref 6–12)
POTASSIUM SERPL-SCNC: 4.1 MMOL/L (ref 3.7–5.3)
PROT SERPL-MCNC: 7 G/DL (ref 6.4–8.3)
RBC # BLD AUTO: 2.41 M/UL (ref 4–5.2)
SODIUM SERPL-SCNC: 134 MMOL/L (ref 135–144)
WBC OTHER # BLD: 5 K/UL (ref 3.5–11)

## 2024-08-22 PROCEDURE — 85027 COMPLETE CBC AUTOMATED: CPT

## 2024-08-22 PROCEDURE — 97530 THERAPEUTIC ACTIVITIES: CPT

## 2024-08-22 PROCEDURE — 99232 SBSQ HOSP IP/OBS MODERATE 35: CPT | Performed by: FAMILY MEDICINE

## 2024-08-22 PROCEDURE — 6370000000 HC RX 637 (ALT 250 FOR IP): Performed by: FAMILY MEDICINE

## 2024-08-22 PROCEDURE — 36415 COLL VENOUS BLD VENIPUNCTURE: CPT

## 2024-08-22 PROCEDURE — 97162 PT EVAL MOD COMPLEX 30 MIN: CPT

## 2024-08-22 PROCEDURE — 82947 ASSAY GLUCOSE BLOOD QUANT: CPT

## 2024-08-22 PROCEDURE — 2580000003 HC RX 258: Performed by: FAMILY MEDICINE

## 2024-08-22 PROCEDURE — 99233 SBSQ HOSP IP/OBS HIGH 50: CPT | Performed by: STUDENT IN AN ORGANIZED HEALTH CARE EDUCATION/TRAINING PROGRAM

## 2024-08-22 PROCEDURE — 1200000000 HC SEMI PRIVATE

## 2024-08-22 PROCEDURE — 80053 COMPREHEN METABOLIC PANEL: CPT

## 2024-08-22 PROCEDURE — 6360000002 HC RX W HCPCS: Performed by: FAMILY MEDICINE

## 2024-08-22 RX ADMIN — HYDROCODONE BITARTRATE AND ACETAMINOPHEN 1 TABLET: 5; 325 TABLET ORAL at 23:25

## 2024-08-22 RX ADMIN — CYCLOBENZAPRINE 5 MG: 10 TABLET, FILM COATED ORAL at 00:02

## 2024-08-22 RX ADMIN — FENTANYL CITRATE 25 MCG: 0.05 INJECTION, SOLUTION INTRAMUSCULAR; INTRAVENOUS at 15:38

## 2024-08-22 RX ADMIN — FENTANYL CITRATE 25 MCG: 0.05 INJECTION, SOLUTION INTRAMUSCULAR; INTRAVENOUS at 20:53

## 2024-08-22 RX ADMIN — HYDROCODONE BITARTRATE AND ACETAMINOPHEN 1 TABLET: 5; 325 TABLET ORAL at 13:49

## 2024-08-22 RX ADMIN — SODIUM BICARBONATE 1300 MG: 650 TABLET ORAL at 20:54

## 2024-08-22 RX ADMIN — INSULIN LISPRO 2 UNITS: 100 INJECTION, SOLUTION INTRAVENOUS; SUBCUTANEOUS at 16:17

## 2024-08-22 RX ADMIN — VENLAFAXINE HYDROCHLORIDE 75 MG: 75 CAPSULE, EXTENDED RELEASE ORAL at 08:03

## 2024-08-22 RX ADMIN — INSULIN GLARGINE 72 UNITS: 100 INJECTION, SOLUTION SUBCUTANEOUS at 08:03

## 2024-08-22 RX ADMIN — ATORVASTATIN CALCIUM 80 MG: 80 TABLET, FILM COATED ORAL at 20:54

## 2024-08-22 RX ADMIN — CYCLOBENZAPRINE 5 MG: 10 TABLET, FILM COATED ORAL at 15:07

## 2024-08-22 RX ADMIN — BUSPIRONE HYDROCHLORIDE 10 MG: 10 TABLET ORAL at 08:03

## 2024-08-22 RX ADMIN — SODIUM CHLORIDE: 9 INJECTION, SOLUTION INTRAVENOUS at 03:06

## 2024-08-22 RX ADMIN — MIDODRINE HYDROCHLORIDE 5 MG: 5 TABLET ORAL at 06:39

## 2024-08-22 RX ADMIN — POTASSIUM CHLORIDE 10 MEQ: 750 CAPSULE, EXTENDED RELEASE ORAL at 20:53

## 2024-08-22 RX ADMIN — INSULIN GLARGINE 42 UNITS: 100 INJECTION, SOLUTION SUBCUTANEOUS at 20:54

## 2024-08-22 RX ADMIN — ASPIRIN 81 MG: 81 TABLET, CHEWABLE ORAL at 08:03

## 2024-08-22 RX ADMIN — DOCUSATE SODIUM 100 MG: 100 CAPSULE, LIQUID FILLED ORAL at 08:02

## 2024-08-22 RX ADMIN — SODIUM BICARBONATE 1300 MG: 650 TABLET ORAL at 08:03

## 2024-08-22 RX ADMIN — DOCUSATE SODIUM 100 MG: 100 CAPSULE, LIQUID FILLED ORAL at 20:54

## 2024-08-22 RX ADMIN — FENTANYL CITRATE 25 MCG: 0.05 INJECTION, SOLUTION INTRAMUSCULAR; INTRAVENOUS at 11:24

## 2024-08-22 RX ADMIN — CARVEDILOL 3.12 MG: 3.12 TABLET, FILM COATED ORAL at 08:03

## 2024-08-22 RX ADMIN — BUSPIRONE HYDROCHLORIDE 10 MG: 10 TABLET ORAL at 20:54

## 2024-08-22 RX ADMIN — BUSPIRONE HYDROCHLORIDE 10 MG: 10 TABLET ORAL at 13:47

## 2024-08-22 RX ADMIN — BUMETANIDE 3 MG: 1 TABLET ORAL at 08:03

## 2024-08-22 RX ADMIN — HYDROCODONE BITARTRATE AND ACETAMINOPHEN 1 TABLET: 5; 325 TABLET ORAL at 18:02

## 2024-08-22 RX ADMIN — ALLOPURINOL 100 MG: 100 TABLET ORAL at 08:03

## 2024-08-22 RX ADMIN — HYDROCODONE BITARTRATE AND ACETAMINOPHEN 1 TABLET: 5; 325 TABLET ORAL at 03:00

## 2024-08-22 RX ADMIN — FERROUS SULFATE TAB 325 MG (65 MG ELEMENTAL FE) 325 MG: 325 (65 FE) TAB at 08:03

## 2024-08-22 RX ADMIN — PRASUGREL 10 MG: 10 TABLET, FILM COATED ORAL at 11:18

## 2024-08-22 RX ADMIN — INSULIN LISPRO 2 UNITS: 100 INJECTION, SOLUTION INTRAVENOUS; SUBCUTANEOUS at 11:18

## 2024-08-22 RX ADMIN — FENTANYL CITRATE 25 MCG: 0.05 INJECTION, SOLUTION INTRAMUSCULAR; INTRAVENOUS at 07:00

## 2024-08-22 RX ADMIN — POTASSIUM CHLORIDE 10 MEQ: 750 CAPSULE, EXTENDED RELEASE ORAL at 08:03

## 2024-08-22 ASSESSMENT — PAIN SCALES - GENERAL
PAINLEVEL_OUTOF10: 7
PAINLEVEL_OUTOF10: 8
PAINLEVEL_OUTOF10: 8
PAINLEVEL_OUTOF10: 10
PAINLEVEL_OUTOF10: 7
PAINLEVEL_OUTOF10: 8
PAINLEVEL_OUTOF10: 9
PAINLEVEL_OUTOF10: 6
PAINLEVEL_OUTOF10: 10
PAINLEVEL_OUTOF10: 9

## 2024-08-22 ASSESSMENT — PAIN DESCRIPTION - ORIENTATION
ORIENTATION: RIGHT
ORIENTATION: LOWER
ORIENTATION: LOWER
ORIENTATION: RIGHT
ORIENTATION: LOWER

## 2024-08-22 ASSESSMENT — PAIN DESCRIPTION - LOCATION
LOCATION: BACK

## 2024-08-22 ASSESSMENT — PAIN DESCRIPTION - DESCRIPTORS
DESCRIPTORS: ACHING;SPASM
DESCRIPTORS: SHOOTING
DESCRIPTORS: SHARP
DESCRIPTORS: ACHING

## 2024-08-22 ASSESSMENT — PAIN - FUNCTIONAL ASSESSMENT
PAIN_FUNCTIONAL_ASSESSMENT: ACTIVITIES ARE NOT PREVENTED

## 2024-08-22 NOTE — PROGRESS NOTES
Progress Note  Date:2024       Room:[facility]2124-01  Patient Name:Estefany Garcia     YOB: 1960     Age:64 y.o.        Subjective    Subjective:  Symptoms:  Resolved.  (No shortness of breath, continues to complain of back pain. She continue to request increased doses of pain medication which I continue to refuse.).    Diet:  Adequate intake.    Activity level: Returning to normal (worse with twisting or bending).    Pain:  She complains of pain that is severe.       Review of Systems  Objective         Vitals Last 24 Hours:  TEMPERATURE:  Temp  Av.2 °F (36.8 °C)  Min: 97.7 °F (36.5 °C)  Max: 98.6 °F (37 °C)  RESPIRATIONS RANGE: Resp  Av.8  Min: 14  Max: 20  PULSE OXIMETRY RANGE: SpO2  Av.8 %  Min: 90 %  Max: 100 %  PULSE RANGE: Pulse  Av.7  Min: 66  Max: 80  BLOOD PRESSURE RANGE: Systolic (24hrs), Av , Min:83 , Max:125   ; Diastolic (24hrs), Av, Min:32, Max:80    I/O (24Hr):    Intake/Output Summary (Last 24 hours) at 2024 0624  Last data filed at 2024 0512  Gross per 24 hour   Intake 1529.67 ml   Output 3014 ml   Net -1484.33 ml       Objective:  General Appearance:  Comfortable.    Vital signs: (most recent): Blood pressure 107/80, pulse 75, temperature 98.6 °F (37 °C), temperature source Oral, resp. rate 16, height 1.651 m (5' 5\"), weight 117.8 kg (259 lb 11.2 oz), SpO2 92%.  Vital signs are normal.      Labs/Imaging/Diagnostics    Labs:  CBC:  Recent Labs     24  0526   WBC 5.0   RBC 2.41*   HGB 9.2*   HCT 29.2*   .2*   RDW 19.5*   *     CHEMISTRIES:  Recent Labs     24  0526   *   K 4.1   CL 95*   CO2 24   BUN 27*   CREATININE 4.7*   GLUCOSE 153*     PT/INR:  No results for input(s): \"PROTIME\", \"INR\" in the last 72 hours.    APTT:  No results for input(s): \"APTT\" in the last 72 hours.    LIVER PROFILE:  Recent Labs     24  0526   AST 22   ALT 17   BILITOT 0.6   ALKPHOS 180*         Imaging Last 24 Hours:  XR CHEST

## 2024-08-22 NOTE — PROGRESS NOTES
0615 Report called to [facility] RN, Abigail    0674 Patient transported to room 2064 via wheelchair. Safety precautions in place. DEDE Hayes at bedside. All belongings transported with patient. Patient showing no signs of distress at this time.    [General Appearance - Alert] : alert [Sclera] : the sclera and conjunctiva were normal [General Appearance - Well Developed] : well developed [General Appearance - Well Nourished] : well nourished [Oropharynx] : the oropharynx was normal [Respiration, Rhythm And Depth] : normal respiratory rhythm and effort [Neck Appearance] : the appearance of the neck was normal [Auscultation Breath Sounds / Voice Sounds] : lungs were clear to auscultation bilaterally [Full Pulse] : the pedal pulses are present [Heart Sounds] : normal S1 and S2 [Abdomen Tenderness] : non-tender [Edema] : there was no peripheral edema [Cervical Lymph Nodes Enlarged Anterior Bilaterally] : anterior cervical [Supraclavicular Lymph Nodes Enlarged Bilaterally] : supraclavicular [Abnormal Walk] : normal gait [No Spinal Tenderness] : no spinal tenderness [Nail Clubbing] : no clubbing  or cyanosis of the fingernails [Motor Tone] : muscle strength and tone were normal [Musculoskeletal - Swelling] : no joint swelling seen [FreeTextEntry1] : FROM neck, shoulders, elbows, wrists, hands, hips, knees, ankles and feet, including the small joints of the hands and feet without any evidence of inflammatory arthritis  [] : no rash [Deep Tendon Reflexes (DTR)] : deep tendon reflexes were 2+ and symmetric [Motor Exam] : the motor exam was normal [Impaired Insight] : insight and judgment were intact [Oriented To Time, Place, And Person] : oriented to person, place, and time [Affect] : the affect was normal

## 2024-08-22 NOTE — PLAN OF CARE
Problem: Safety - Adult  Goal: Free from fall injury  8/22/2024 1609 by Zeenat Schmidt RN  Outcome: Progressing     Problem: Discharge Planning  Goal: Discharge to home or other facility with appropriate resources  8/22/2024 1609 by Zeenat Schmidt RN  Outcome: Progressing     Problem: Pain  Goal: Verbalizes/displays adequate comfort level or baseline comfort level  8/22/2024 1609 by Zeenat Schmidt RN  Outcome: Progressing     Problem: Skin/Tissue Integrity  Goal: Absence of new skin breakdown  Description: 1.  Monitor for areas of redness and/or skin breakdown  2.  Assess vascular access sites hourly  3.  Every 4-6 hours minimum:  Change oxygen saturation probe site  4.  Every 4-6 hours:  If on nasal continuous positive airway pressure, respiratory therapy assess nares and determine need for appliance change or resting period.  8/22/2024 1609 by Zeenat Schmidt RN  Outcome: Progressing     Problem: Chronic Conditions and Co-morbidities  Goal: Patient's chronic conditions and co-morbidity symptoms are monitored and maintained or improved  8/22/2024 1609 by Zeenat Schmidt RN  Outcome: Progressing     Problem: Respiratory - Adult  Goal: Achieves optimal ventilation and oxygenation  8/22/2024 1609 by Zeenat Schmidt RN  Outcome: Progressing     Problem: Cardiovascular - Adult  Goal: Maintains optimal cardiac output and hemodynamic stability  8/22/2024 1609 by Zeenat Schmidt RN  Outcome: Progressing     Problem: Cardiovascular - Adult  Goal: Absence of cardiac dysrhythmias or at baseline  8/22/2024 1609 by Zeenat Schmidt RN  Outcome: Progressing     Problem: Skin/Tissue Integrity - Adult  Goal: Skin integrity remains intact  8/22/2024 1609 by Zeenat Schmidt RN  Outcome: Progressing  Flowsheets (Taken 8/22/2024 0728)  Skin Integrity Remains Intact: Monitor for areas of redness and/or skin breakdown     Problem: Skin/Tissue Integrity - Adult  Goal: Oral mucous membranes remain intact  8/22/2024 1609 by Zeenat Schmidt RN  Outcome:  Progressing     Problem: Musculoskeletal - Adult  Goal: Return mobility to safest level of function  8/22/2024 1609 by Zeenat Schmidt RN  Outcome: Progressing     Problem: Musculoskeletal - Adult  Goal: Maintain proper alignment of affected body part  8/22/2024 1609 by Zeenat Schmidt RN  Outcome: Progressing     Problem: Musculoskeletal - Adult  Goal: Return ADL status to a safe level of function  8/22/2024 1609 by Zeenat Schmidt RN  Outcome: Progressing     Problem: Gastrointestinal - Adult  Goal: Maintains adequate nutritional intake  8/22/2024 1609 by Zeenat Schmidt RN  Outcome: Progressing     Problem: Genitourinary - Adult  Goal: Absence of urinary retention  8/22/2024 1609 by Zeenat Schmidt RN  Outcome: Progressing     Problem: Infection - Adult  Goal: Absence of infection at discharge  8/22/2024 1609 by Zeenat Schmidt RN  Outcome: Progressing     Problem: Metabolic/Fluid and Electrolytes - Adult  Goal: Electrolytes maintained within normal limits  8/22/2024 1609 by Zeenat Schmidt RN  Outcome: Progressing     Problem: Metabolic/Fluid and Electrolytes - Adult  Goal: Hemodynamic stability and optimal renal function maintained  8/22/2024 1609 by Zeenat Schmidt RN  Outcome: Progressing     Problem: Metabolic/Fluid and Electrolytes - Adult  Goal: Glucose maintained within prescribed range  8/22/2024 1609 by Zeenat Schmidt RN  Outcome: Progressing     Problem: Hematologic - Adult  Goal: Maintains hematologic stability  8/22/2024 1609 by Zeenat Schmidt RN  Outcome: Progressing

## 2024-08-22 NOTE — PROGRESS NOTES
Department of Internal Medicine  Nephrology Aneudy Jurado MD Progress Note    Reason for consultation: Management of hemodialysis dependent End-stage renal disease.    Consulting physician: Madonna Aviles MD.      Interval history:   Patient was seen and examined today, patient had dialysis yesterday tolerated well.  Patient denies any chest pain shortness of breath  Blood pressure stable    status post cardiac catheterization 8/20/2024 which showed patent lima to LAD, SVG to D1 proximal body 70% stenosis unchanged from last cath and patent SVG to IMI with post anastomotic severe mismatch with vessel being 1 mm in caliber with 82% 90% stenosis.  Known occluded ostial LAD and proximal left circumflex.  Severe distal RCA in-stent stenosis along with severe mid RPDA in-stent stent stent restenosis, successful balloon angioplasty only of RPDA/RCA      History of present illness: This is a 64 y.o. female with a significant past medical history of Systemic hypertension, osteoarthritis,  coronary artery disease [s/p CABG with subsequent graft stent], s/p cerebrovascular accident, type 2 diabetes mellitus and End-stage renal disease secondary to hypertensive and diabetic nephropathy [on routine hemodialysis Monday/Wednesday/Friday at [facility] dialysis unit [facility] care of Dr. Graham using left arm AV fistula], who presented to the emergency department at [facility]  8/024 with complaints of right-sided mid back pain.  She has a history of spine surgery in 2001 and had been to the [facility] ED on 8/13/2024 with CT scan of the thoracic and lumbar spine suggesting possible small hardware fracture and she was scheduled to follow-up with her spine surgeon, Dr. Pederson on 8/15/2024    Blood pressure at presentation was 90/61 mmHg. Laboratory studies at presentation remarkable for BUN/creatinine 37/5.8 mg/dL.  She did receive acute hemodialysis yesterday evening after admission.  She feels

## 2024-08-22 NOTE — PROGRESS NOTES
Spiritual Health Assessment/Progress Note  [facility]    (P) Loneliness/Social Isolation,  ,  ,      Name: Estefany Garcia MRN: [id number]    Age: 64 y.o.     Sex: female   Language: English   Catholic: Orthodoxy   NSTEMI (non-ST elevated myocardial infarction) (HCC)     Date: 8/22/2024                           Spiritual Assessment began in [facility] MED SURG        Referral/Consult From: (P) Nurse   Encounter Overview/Reason: (P) Loneliness/Social Isolation  Service Provided For: (P) Patient not available (Sleeping)    Mary, Belief, Meaning:   Patient unable to communicate at this time  Family/Friends No family/friends present      Importance and Influence:  Patient unable to communicate at this time  Family/Friends no family/friends present    Community:  Patient Other: Sleeping  Family/Friends Other: No family not present.    Assessment and Plan of Care:     Patient Interventions include: Other: Prayer  Family/Friends Interventions include: Other: No family present    Patient Plan of Care: Spiritual Care available upon further referral  Family/Friends Plan of Care: Other: No family present.    Electronically signed by Chaplain Barak on 8/22/2024 at 1:09 PM

## 2024-08-22 NOTE — PLAN OF CARE
Problem: Safety - Adult  Goal: Free from fall injury  8/22/2024 0605 by Rosie Gardiner RN  Outcome: Progressing     Problem: Discharge Planning  Goal: Discharge to home or other facility with appropriate resources  8/22/2024 0605 by Rosie Gardiner RN  Outcome: Progressing     Problem: Pain  Goal: Verbalizes/displays adequate comfort level or baseline comfort level  8/22/2024 0605 by Rosie Gardiner RN  Outcome: Progressing     Problem: Skin/Tissue Integrity  Goal: Absence of new skin breakdown  Description: 1.  Monitor for areas of redness and/or skin breakdown  2.  Assess vascular access sites hourly  3.  Every 4-6 hours minimum:  Change oxygen saturation probe site  4.  Every 4-6 hours:  If on nasal continuous positive airway pressure, respiratory therapy assess nares and determine need for appliance change or resting period.  8/22/2024 0605 by Rosie Gardiner RN  Outcome: Progressing     Problem: Chronic Conditions and Co-morbidities  Goal: Patient's chronic conditions and co-morbidity symptoms are monitored and maintained or improved  8/22/2024 0605 by Rosie Gardiner RN  Outcome: Progressing     Problem: Respiratory - Adult  Goal: Achieves optimal ventilation and oxygenation  Outcome: Progressing  Flowsheets (Taken 8/21/2024 2000)  Achieves optimal ventilation and oxygenation:   Assess for changes in respiratory status   Oxygen supplementation based on oxygen saturation or arterial blood gases   Assess for changes in mentation and behavior   Position to facilitate oxygenation and minimize respiratory effort     Problem: Cardiovascular - Adult  Goal: Maintains optimal cardiac output and hemodynamic stability  Outcome: Progressing  Flowsheets (Taken 8/21/2024 2000)  Maintains optimal cardiac output and hemodynamic stability:   Monitor blood pressure and heart rate   Monitor urine output and notify Licensed Independent Practitioner for values outside of normal range   Assess for signs of  needed   Obtain physical therapy/occupational therapy consults as needed   Assist and instruct patient to increase activity and self care as tolerated     Problem: Gastrointestinal - Adult  Goal: Maintains adequate nutritional intake  Outcome: Progressing  Flowsheets (Taken 8/21/2024 2000)  Maintains adequate nutritional intake:   Monitor percentage of each meal consumed   Monitor intake and output, weight and lab values     Problem: Genitourinary - Adult  Goal: Absence of urinary retention  Outcome: Progressing  Flowsheets (Taken 8/21/2024 2000)  Absence of urinary retention:   Assess patient’s ability to void and empty bladder   Monitor intake/output and perform bladder scan as needed     Problem: Infection - Adult  Goal: Absence of infection at discharge  Outcome: Progressing  Flowsheets (Taken 8/21/2024 2000)  Absence of infection at discharge:   Assess and monitor for signs and symptoms of infection   Monitor lab/diagnostic results   Monitor all insertion sites i.e., indwelling lines, tubes and drains     Problem: Metabolic/Fluid and Electrolytes - Adult  Goal: Electrolytes maintained within normal limits  Outcome: Progressing  Flowsheets (Taken 8/21/2024 2000)  Electrolytes maintained within normal limits:   Monitor labs and assess patient for signs and symptoms of electrolyte imbalances   Administer electrolyte replacement as ordered   Monitor response to electrolyte replacements, including repeat lab results as appropriate   Fluid restriction as ordered   Instruct patient on fluid and nutrition restrictions as appropriate     Problem: Metabolic/Fluid and Electrolytes - Adult  Goal: Hemodynamic stability and optimal renal function maintained  Outcome: Progressing  Flowsheets (Taken 8/21/2024 2000)  Hemodynamic stability and optimal renal function maintained:   Monitor labs and assess for signs and symptoms of volume excess or deficit   Monitor intake, output and patient weight     Problem: Metabolic/Fluid and

## 2024-08-22 NOTE — PROGRESS NOTES
Physical Therapy  Facility/Department: [facility] SURG  Physical Therapy Initial Assessment    Name: Estefany Garcia  : 1960  MRN: [id number]  Date of Service: 2024    Discharge Recommendations:  Patient would benefit from continued therapy after discharge, Therapy recommended at discharge   PT Equipment Recommendations  Equipment Needed:  (TBD)      Past Medical History:  has a past medical history of Arthritis, Backache, unspecified, CAD (coronary artery disease), Cerebral artery occlusion with cerebral infarction (HCC), CHF (congestive heart failure) (HCC), Chronic kidney disease, Coronary atherosclerosis of artery bypass graft, COVID, Cramp of limb, Epistaxis, Gallstones, Hemodialysis patient (HCC), Hyperlipidemia, Hypertension, Insomnia, Neuromuscular disorder (HCC), Pneumonia, Psychiatric problem, Thyroid disease, Type II or unspecified type diabetes mellitus with renal manifestations, not stated as uncontrolled(250.40), Type II or unspecified type diabetes mellitus without mention of complication, not stated as uncontrolled, and Unspecified vitamin D deficiency.  Past Surgical History:  has a past surgical history that includes Coronary artery bypass graft (2005); Knee arthroscopy; Carpal tunnel release; Breast surgery; Tonsillectomy; Hand surgery; Ankle fracture surgery; Cholecystectomy, open (N/A); IR TUNNELED CVC PLACE WO SQ PORT/PUMP > 5 YEARS (2021); AV fistula creation (2021); Dialysis fistula creation (Left, 2021); other surgical history (2022); back surgery; Colonoscopy; eye surgery; fracture surgery; Cardiac surgery; IR TUNNELED CVC PLACE WO SQ PORT/PUMP > 5 YEARS (2023); IR TUNNELED CVC PLACE WO SQ PORT/PUMP > 5 YEARS (Right, 2023); Dialysis Catheter Insertion (Right, 2023); other surgical history (Left, 2023); Coronary angioplasty with stent (2023); Cardiac catheterization (); Coronary angioplasty with stent (); Dialysis fistula  creation (Left, 04/19/2023); IR TUNNELED CVC PLACE WO SQ PORT/PUMP > 5 YEARS (06/07/2023); IR TUNNELED CVC PLACE WO SQ PORT/PUMP > 5 YEARS (06/08/2023); Coronary angioplasty with stent (11/03/2023); Cardiac procedure (N/A, 11/03/2023); Percutaneous Transluminal Coronary Angio (11/15/2023); Cardiac procedure (N/A, 11/15/2023); vascular surgery (Left, 02/08/2024); vascular surgery (Left, 02/08/2024); Cardiac procedure (N/A, 02/28/2024); Cardiac procedure (N/A, 02/28/2024); Breast reduction surgery; Fistulagram (Left, 4/25/2024); and Fistulagram (Left, 7/1/2024).    Assessment   Body Structures, Functions, Activity Limitations Requiring Skilled Therapeutic Intervention: Decreased functional mobility ;Decreased endurance;Decreased balance  Assessment: Impaired functional mobility due to decreased tolerance to activity and safety concerns of decreased balance  Treatment Diagnosis: Impaired functional mobility and strength secondary to current management of NSTEMI complications  Specific Instructions for Next Treatment: Imcrease independence with bed mobilitly and transfer activities, increase dynamic standing and sitting balance, increase ambulation distance with use of RW, introduce and educate pt in exercise program.  Therapy Prognosis: Good  Decision Making: Medium Complexity  History: NSTEMI  Exam: MMT, ROM, functional mobilitly, transfers, gait, balance  Clinical Presentation: Pt demonstrates CGA for bed mobilitly, transfers, and ambulation activities with use of RW.  Barriers to Learning: None  Requires PT Follow-Up: Yes  Activity Tolerance  Activity Tolerance: Patient limited by endurance;Patient limited by fatigue;Patient limited by pain     Plan   Physical Therapy Plan  General Plan:  (5-7x per week)  Specific Instructions for Next Treatment: Imcrease independence with bed mobilitly and transfer activities, increase dynamic standing and sitting balance, increase ambulation distance with use of RW, introduce and  Practitioner: Niesha Sunshine R MD  Referral Date : 08/21/24  Diagnosis: NSTEMI  Follows Commands: Within Functional Limits  Other (Comment): Okay per nurse Zeenat for PT evaluation  General Comment  Comments: Chest X-Ray shows IMPRESSION:  Cardiomegaly with pulmonary vascular congestion. CT of lumbar spine shows IMPRESSION:   1. Posterior spinal fixation changes with questionable loosening of the right L4 transpedicular screw, though findings may be artifactual due to technique. Correlation with clinical presentation and continued attention on follow-up, as clinically warranted.     2.  Multilevel degenerative changes with at least moderate bilateral foraminal stenosis at L3-L4 and to a lesser extent L2-L3, progressed from CT of 2/25/2021. Of note, MRI is more sensitive for the evaluation of spinal canal or foraminal stenosis. CT of thoracic spine shows IMPRESSION:   1. Normal alignment of the thoracic spine without acute osseous abnormality seen. L heart cath with cardiac stent placement 8/20/2024 at [facility].  Subjective  Subjective: Pt seated in chair upon arrival. Pt states she was tired due to not sleeping well, she states she has been unable to find a comfortable position in bed and seated in chair.         Social/Functional History  Social/Functional History  Lives With: Alone  Type of Home: Facility ([facility])  Home Layout: One level  Home Access: Level entry  Bathroom Shower/Tub: Shower chair with back, Curtain, Walk-in shower  Bathroom Toilet: Handicap height  Bathroom Equipment: Grab bars in shower, Grab bars around toilet, Hand-held shower  Bathroom Accessibility: Accessible  Home Equipment: Cane, Wheelchair - Manual, Adjustable bed, Reacher, Walker - 4-Wheeled  Has the patient had two or more falls in the past year or any fall with injury in the past year?: Yes (Pt reports she was in kitchen doing tasks when she fell, reports dizziness and light-headedness before falls)  Receives Help From:

## 2024-08-22 NOTE — PROGRESS NOTES
[facility]   Progress Note                   Date:   8/22/2024  Patient name: Estefany Garcia  Date of admission:  8/20/2024  7:27 PM  MRN:   [id number]  YOB: 1960  PCP: Zulma Payne APRN - CNP    Reason for Admission: NSTEMI (non-ST elevated myocardial infarction) ([facility]) [I21.4]    Subjective:       Clinical Changes / Abnormalities:sitting in chair, eating lunch, denies any chest pain  She continues to have back pain, discussed her condition with the patient and her family (Hailey) over the phone  We discussed the importance of dual antiplatelets in light of recent PCI which unfortunately delays any surgical intervention on her back pain. She expressed understanding and agreement with our recommendations.      Medications:   Scheduled Meds:   aspirin  81 mg Oral Daily    busPIRone  10 mg Oral TID    prasugrel  10 mg Oral Daily    venlafaxine  75 mg Oral Daily with breakfast    docusate sodium  100 mg Oral BID    allopurinol  100 mg Oral Daily    sodium bicarbonate  1,300 mg Oral BID    ferrous sulfate  325 mg Oral Daily    atorvastatin  80 mg Oral Nightly    insulin glargine  72 Units SubCUTAneous QAM    carvedilol  3.125 mg Oral BID    potassium chloride  10 mEq Oral BID    bumetanide  3 mg Oral Once per day on Sunday Tuesday Thursday Saturday    lidocaine  1 patch TransDERmal Daily    epoetin raphael-epbx  3,000 Units SubCUTAneous Once per day on Monday Wednesday Friday    insulin lispro  0-8 Units SubCUTAneous TID WC    insulin lispro  0-4 Units SubCUTAneous Nightly    insulin glargine  42 Units SubCUTAneous Nightly     Continuous Infusions:   sodium chloride 25 mL/hr at 08/22/24 0306    dextrose       CBC:   Recent Labs     08/22/24  0526   WBC 5.0   HGB 9.2*   *     BMP:    Recent Labs     08/22/24  0526   *   K 4.1   CL 95*   CO2 24   BUN 27*   CREATININE 4.7*   GLUCOSE 153*     Hepatic:   Recent Labs     08/22/24  0526   AST 22   ALT 17   BILITOT 0.6   ALKPHOS 180*  right wrist     End-stage renal disease needing dialysis (HCC)     Pulmonary congestion     Acute coronary syndrome (HCC)     Hx of coronary artery disease      Plan of Treatment:   No further cardiac testing or intervention required at this point.  She continues to have back pain, discussed her condition with the patient and her family (Hailey) over the phone  We discussed the importance of dual antiplatelets in light of recent PCI which unfortunately delays any surgical intervention on her back pain. She expressed understanding and agreement with our recommendations.    Cardiology will sign off, please reach out with any questions    Jeremie Munguia MD  [facility]  [phone]

## 2024-08-23 PROBLEM — Z98.61 STATUS POST CORONARY ANGIOPLASTY: Status: ACTIVE | Noted: 2024-08-23

## 2024-08-23 PROBLEM — M54.50 CHRONIC BILATERAL LOW BACK PAIN WITHOUT SCIATICA: Status: ACTIVE | Noted: 2021-09-30

## 2024-08-23 LAB
ANION GAP SERPL CALCULATED.3IONS-SCNC: 15 MMOL/L (ref 9–17)
BUN SERPL-MCNC: 36 MG/DL (ref 8–23)
CALCIUM SERPL-MCNC: 8.3 MG/DL (ref 8.6–10.4)
CHLORIDE SERPL-SCNC: 94 MMOL/L (ref 98–107)
CO2 SERPL-SCNC: 24 MMOL/L (ref 20–31)
CREAT SERPL-MCNC: 5.6 MG/DL (ref 0.5–0.9)
GFR, ESTIMATED: 8 ML/MIN/1.73M2
GLUCOSE BLD-MCNC: 157 MG/DL (ref 65–105)
GLUCOSE BLD-MCNC: 169 MG/DL (ref 65–105)
GLUCOSE BLD-MCNC: 220 MG/DL (ref 65–105)
GLUCOSE SERPL-MCNC: 151 MG/DL (ref 70–99)
HGB BLD-MCNC: 9.3 G/DL (ref 12–16)
POTASSIUM SERPL-SCNC: 4.4 MMOL/L (ref 3.7–5.3)
SODIUM SERPL-SCNC: 133 MMOL/L (ref 135–144)

## 2024-08-23 PROCEDURE — 2580000003 HC RX 258: Performed by: FAMILY MEDICINE

## 2024-08-23 PROCEDURE — 6360000002 HC RX W HCPCS: Performed by: FAMILY MEDICINE

## 2024-08-23 PROCEDURE — 85018 HEMOGLOBIN: CPT

## 2024-08-23 PROCEDURE — 1200000000 HC SEMI PRIVATE

## 2024-08-23 PROCEDURE — 82947 ASSAY GLUCOSE BLOOD QUANT: CPT

## 2024-08-23 PROCEDURE — 97116 GAIT TRAINING THERAPY: CPT

## 2024-08-23 PROCEDURE — 99233 SBSQ HOSP IP/OBS HIGH 50: CPT | Performed by: NURSE PRACTITIONER

## 2024-08-23 PROCEDURE — 36415 COLL VENOUS BLD VENIPUNCTURE: CPT

## 2024-08-23 PROCEDURE — 99232 SBSQ HOSP IP/OBS MODERATE 35: CPT | Performed by: FAMILY MEDICINE

## 2024-08-23 PROCEDURE — 90935 HEMODIALYSIS ONE EVALUATION: CPT

## 2024-08-23 PROCEDURE — 6370000000 HC RX 637 (ALT 250 FOR IP): Performed by: FAMILY MEDICINE

## 2024-08-23 PROCEDURE — 97530 THERAPEUTIC ACTIVITIES: CPT

## 2024-08-23 PROCEDURE — 80048 BASIC METABOLIC PNL TOTAL CA: CPT

## 2024-08-23 RX ORDER — HYDROCODONE BITARTRATE AND ACETAMINOPHEN 5; 325 MG/1; MG/1
1 TABLET ORAL EVERY 4 HOURS PRN
Qty: 18 TABLET | Refills: 0 | Status: SHIPPED | OUTPATIENT
Start: 2024-08-23 | End: 2024-08-26

## 2024-08-23 RX ADMIN — BUSPIRONE HYDROCHLORIDE 10 MG: 10 TABLET ORAL at 09:07

## 2024-08-23 RX ADMIN — FENTANYL CITRATE 25 MCG: 0.05 INJECTION, SOLUTION INTRAMUSCULAR; INTRAVENOUS at 20:39

## 2024-08-23 RX ADMIN — INSULIN GLARGINE 42 UNITS: 100 INJECTION, SOLUTION SUBCUTANEOUS at 20:39

## 2024-08-23 RX ADMIN — POTASSIUM CHLORIDE 10 MEQ: 750 CAPSULE, EXTENDED RELEASE ORAL at 09:07

## 2024-08-23 RX ADMIN — SODIUM BICARBONATE 1300 MG: 650 TABLET ORAL at 09:07

## 2024-08-23 RX ADMIN — HYDROCODONE BITARTRATE AND ACETAMINOPHEN 1 TABLET: 5; 325 TABLET ORAL at 22:18

## 2024-08-23 RX ADMIN — BUSPIRONE HYDROCHLORIDE 10 MG: 10 TABLET ORAL at 20:39

## 2024-08-23 RX ADMIN — PRASUGREL 10 MG: 10 TABLET, FILM COATED ORAL at 15:46

## 2024-08-23 RX ADMIN — ASPIRIN 81 MG: 81 TABLET, CHEWABLE ORAL at 09:07

## 2024-08-23 RX ADMIN — CARVEDILOL 3.12 MG: 3.12 TABLET, FILM COATED ORAL at 20:38

## 2024-08-23 RX ADMIN — SODIUM BICARBONATE 1300 MG: 650 TABLET ORAL at 20:38

## 2024-08-23 RX ADMIN — HYDROCODONE BITARTRATE AND ACETAMINOPHEN 1 TABLET: 5; 325 TABLET ORAL at 09:07

## 2024-08-23 RX ADMIN — ALLOPURINOL 100 MG: 100 TABLET ORAL at 09:08

## 2024-08-23 RX ADMIN — ATORVASTATIN CALCIUM 80 MG: 80 TABLET, FILM COATED ORAL at 20:39

## 2024-08-23 RX ADMIN — EPOETIN ALFA-EPBX 3000 UNITS: 3000 INJECTION, SOLUTION INTRAVENOUS; SUBCUTANEOUS at 17:08

## 2024-08-23 RX ADMIN — FENTANYL CITRATE 25 MCG: 0.05 INJECTION, SOLUTION INTRAMUSCULAR; INTRAVENOUS at 07:12

## 2024-08-23 RX ADMIN — DOCUSATE SODIUM 100 MG: 100 CAPSULE, LIQUID FILLED ORAL at 09:08

## 2024-08-23 RX ADMIN — BUSPIRONE HYDROCHLORIDE 10 MG: 10 TABLET ORAL at 15:14

## 2024-08-23 RX ADMIN — VENLAFAXINE HYDROCHLORIDE 75 MG: 75 CAPSULE, EXTENDED RELEASE ORAL at 09:07

## 2024-08-23 RX ADMIN — SODIUM CHLORIDE: 9 INJECTION, SOLUTION INTRAVENOUS at 20:49

## 2024-08-23 RX ADMIN — HYDROCODONE BITARTRATE AND ACETAMINOPHEN 1 TABLET: 5; 325 TABLET ORAL at 17:28

## 2024-08-23 RX ADMIN — CYCLOBENZAPRINE 5 MG: 10 TABLET, FILM COATED ORAL at 09:39

## 2024-08-23 RX ADMIN — DOCUSATE SODIUM 100 MG: 100 CAPSULE, LIQUID FILLED ORAL at 20:38

## 2024-08-23 RX ADMIN — INSULIN GLARGINE 72 UNITS: 100 INJECTION, SOLUTION SUBCUTANEOUS at 09:08

## 2024-08-23 RX ADMIN — Medication 9 MG: at 22:16

## 2024-08-23 RX ADMIN — FENTANYL CITRATE 25 MCG: 0.05 INJECTION, SOLUTION INTRAMUSCULAR; INTRAVENOUS at 15:23

## 2024-08-23 RX ADMIN — FERROUS SULFATE TAB 325 MG (65 MG ELEMENTAL FE) 325 MG: 325 (65 FE) TAB at 09:08

## 2024-08-23 RX ADMIN — CYCLOBENZAPRINE 5 MG: 10 TABLET, FILM COATED ORAL at 00:43

## 2024-08-23 RX ADMIN — POTASSIUM CHLORIDE 10 MEQ: 750 CAPSULE, EXTENDED RELEASE ORAL at 20:38

## 2024-08-23 ASSESSMENT — PAIN DESCRIPTION - LOCATION
LOCATION: BACK

## 2024-08-23 ASSESSMENT — PAIN SCALES - GENERAL
PAINLEVEL_OUTOF10: 9
PAINLEVEL_OUTOF10: 10
PAINLEVEL_OUTOF10: 10
PAINLEVEL_OUTOF10: 7
PAINLEVEL_OUTOF10: 5
PAINLEVEL_OUTOF10: 8
PAINLEVEL_OUTOF10: 8

## 2024-08-23 ASSESSMENT — PAIN - FUNCTIONAL ASSESSMENT
PAIN_FUNCTIONAL_ASSESSMENT: ACTIVITIES ARE NOT PREVENTED
PAIN_FUNCTIONAL_ASSESSMENT: ACTIVITIES ARE NOT PREVENTED
PAIN_FUNCTIONAL_ASSESSMENT: PREVENTS OR INTERFERES SOME ACTIVE ACTIVITIES AND ADLS
PAIN_FUNCTIONAL_ASSESSMENT: PREVENTS OR INTERFERES SOME ACTIVE ACTIVITIES AND ADLS

## 2024-08-23 ASSESSMENT — PAIN DESCRIPTION - DESCRIPTORS
DESCRIPTORS: SORE
DESCRIPTORS: ACHING
DESCRIPTORS: SPASM
DESCRIPTORS: STABBING

## 2024-08-23 ASSESSMENT — PAIN DESCRIPTION - ORIENTATION
ORIENTATION: LOWER
ORIENTATION: LOWER

## 2024-08-23 NOTE — PLAN OF CARE
Problem: Safety - Adult  Goal: Free from fall injury  8/23/2024 0347 by Ruthie Gann RN  Outcome: Progressing     Problem: Discharge Planning  Goal: Discharge to home or other facility with appropriate resources  8/23/2024 0347 by Ruthie Gann RN  Outcome: Progressing     Problem: Pain  Goal: Verbalizes/displays adequate comfort level or baseline comfort level  8/23/2024 0347 by Ruthie Gann RN  Outcome: Progressing     Problem: Cardiovascular - Adult  Goal: Maintains optimal cardiac output and hemodynamic stability  8/23/2024 0347 by Ruthie Gann RN  Outcome: Progressing     Problem: Musculoskeletal - Adult  Goal: Return mobility to safest level of function  8/23/2024 0347 by Ruthie Gann RN  Outcome: Progressing     Problem: Hematologic - Adult  Goal: Maintains hematologic stability  8/23/2024 0347 by Ruthie Gann RN  Outcome: Progressing

## 2024-08-23 NOTE — PROGRESS NOTES
[facility]   Progress Note                   Date:   8/23/2024  Patient name: Estefany Garcia  Date of admission:  8/20/2024  7:27 PM  MRN:   [id number]  YOB: 1960  PCP: Zulma Payne, AFSANEH - CNP    Reason for Admission: NSTEMI (non-ST elevated myocardial infarction) ([facility]) [I21.4]    Subjective:       Clinical Changes / Abnormalities: Stable from CV standpoint. Denies any chest pain/pressure. Continues to have back pain. Labs and vitals reviewed.       Medications:   Scheduled Meds:   aspirin  81 mg Oral Daily    busPIRone  10 mg Oral TID    prasugrel  10 mg Oral Daily    venlafaxine  75 mg Oral Daily with breakfast    docusate sodium  100 mg Oral BID    allopurinol  100 mg Oral Daily    sodium bicarbonate  1,300 mg Oral BID    ferrous sulfate  325 mg Oral Daily    atorvastatin  80 mg Oral Nightly    insulin glargine  72 Units SubCUTAneous QAM    carvedilol  3.125 mg Oral BID    potassium chloride  10 mEq Oral BID    bumetanide  3 mg Oral Once per day on Sunday Tuesday Thursday Saturday    lidocaine  1 patch TransDERmal Daily    epoetin raphael-epbx  3,000 Units SubCUTAneous Once per day on Monday Wednesday Friday    insulin lispro  0-8 Units SubCUTAneous TID WC    insulin lispro  0-4 Units SubCUTAneous Nightly    insulin glargine  42 Units SubCUTAneous Nightly     Continuous Infusions:   sodium chloride 25 mL/hr at 08/22/24 0306    dextrose       CBC:   Recent Labs     08/22/24  0526 08/23/24  0714   WBC 5.0  --    HGB 9.2* 9.3*   *  --      BMP:    Recent Labs     08/22/24  0526 08/23/24  0714   * 133*   K 4.1 4.4   CL 95* 94*   CO2 24 24   BUN 27* 36*   CREATININE 4.7* 5.6*   GLUCOSE 153* 151*     Hepatic:   Recent Labs     08/22/24  0526   AST 22   ALT 17   BILITOT 0.6   ALKPHOS 180*     Troponin: No results for input(s): \"TROPONINI\" in the last 72 hours.  BNP: No results for input(s): \"BNP\" in the last 72 hours.  Lipids: No results for input(s): \"CHOL\", \"HDL\" in the last  72 hours.    Invalid input(s): \"LDLCALCU\"  INR: No results for input(s): \"INR\" in the last 72 hours.    Objective:   Vitals: BP (!) 104/45   Pulse 77   Temp 98.6 °F (37 °C)   Resp 16   Ht 1.651 m (5' 5\")   Wt 117.8 kg (259 lb 11.2 oz)   SpO2 91%   BMI 43.22 kg/m²   General appearance: alert and cooperative with exam  HEENT: Head: Normocephalic, no lesions, without obvious abnormality.  Neck: no JVD  Lungs: CTAB  Heart: RRR s1+s2, no murmurs  Abdomen: soft, non-tender  Extremities: no edema  Neurologic: not done        Assessment / Acute Cardiac Problems:   Chest pain and troponin elevation    Patient Active Problem List:     Atherosclerosis of coronary artery bypass graft of native heart with angina pectoris ([facility])     Acute kidney injury superimposed on CKD ([facility])     Chronic diastolic heart failure ([facility])     Diabetic polyneuropathy associated with type 2 diabetes mellitus ([facility])     Hx of CABG     Iron deficiency anemia     Spinal stenosis of lumbar region with neurogenic claudication     Mixed hyperlipidemia     CKD (chronic kidney disease) stage 4, GFR 15-29 ml/min ([facility])     Type 2 diabetes 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, with long-term current use of insulin ([facility])     Obesity, Class II, BMI 35-39.9     Thyroid nodule greater than or equal to 1 cm in diameter incidentally noted on imaging study     Essential hypertension     Anemia of chronic disease     Chronic ischemic heart disease     Ischemic stroke of frontal lobe ([facility])     Morbid obesity with BMI of 40.0-44.9, adult ([facility])     Disequilibrium syndrome     Anxiety     Chronic midline low back pain with bilateral sciatica     Acute thoracic back pain     Delirium due to another medical condition     Cerebral hypoperfusion     Immature arteriovenous fistula ([facility])     History of fusion of cervical spine     Depression with anxiety     Vancomycin resistant Enterococcus UTI     ESRD on hemodialysis ([facility])     Dialysis disequilibrium  cardiac testing or intervention required at this point. Post cath w/ POBA RCA/RPDA as above.   She continues to have back pain - defer to primary/ortho. She follows with MD as OP for this also. It has been discussed with patient/family mult times the importance of DAPT which unfortunately delays any surgical intervention on her back pain. She expressed understanding and agreement with our recommendations.  Stable for d/c from CV standpoint. With OP f/u in clinic as scheduled      Kera Durant, APRN - CNP  [facility]  [phone]

## 2024-08-23 NOTE — PROGRESS NOTES
Department of Internal Medicine  Nephrology Aneudy Jurado MD Progress Note    Reason for consultation: Management of hemodialysis dependent End-stage renal disease.    Consulting physician: Madonna Aviles MD.      Interval history:   Patient was seen and examined today during dialysis,   Patient c/o back pain.  No chest pain  Blood pressure stable    status post cardiac catheterization 8/20/2024 which showed patent lima to LAD, SVG to D1 proximal body 70% stenosis unchanged from last cath and patent SVG to IMI with post anastomotic severe mismatch with vessel being 1 mm in caliber with 82% 90% stenosis.  Known occluded ostial LAD and proximal left circumflex.  Severe distal RCA in-stent stenosis along with severe mid RPDA in-stent stent stent restenosis, successful balloon angioplasty only of RPDA/RCA      History of present illness: This is a 64 y.o. female with a significant past medical history of Systemic hypertension, osteoarthritis,  coronary artery disease [s/p CABG with subsequent graft stent], s/p cerebrovascular accident, type 2 diabetes mellitus and End-stage renal disease secondary to hypertensive and diabetic nephropathy [on routine hemodialysis Monday/Wednesday/Friday at [facility] dialysis unit [facility] urinary care of Dr. Graham using left arm AV fistula], who presented to the emergency department at [facility]  8/024 with complaints of right-sided mid back pain.  She has a history of spine surgery in 2001 and had been to the [facility] ED on 8/13/2024 with CT scan of the thoracic and lumbar spine suggesting possible small hardware fracture and she was scheduled to follow-up with her spine surgeon, Dr. Pederson on 8/15/2024    Blood pressure at presentation was 90/61 mmHg. Laboratory studies at presentation remarkable for BUN/creatinine 37/5.8 mg/dL.  She did receive acute hemodialysis yesterday evening after admission.  She feels better today.  Laboratory studies today  were reviewed.    Scheduled Meds:   aspirin  81 mg Oral Daily    busPIRone  10 mg Oral TID    prasugrel  10 mg Oral Daily    venlafaxine  75 mg Oral Daily with breakfast    docusate sodium  100 mg Oral BID    allopurinol  100 mg Oral Daily    sodium bicarbonate  1,300 mg Oral BID    ferrous sulfate  325 mg Oral Daily    atorvastatin  80 mg Oral Nightly    insulin glargine  72 Units SubCUTAneous QAM    carvedilol  3.125 mg Oral BID    potassium chloride  10 mEq Oral BID    bumetanide  3 mg Oral Once per day on     lidocaine  1 patch TransDERmal Daily    epoetin raphael-epbx  3,000 Units SubCUTAneous Once per day on     insulin lispro  0-8 Units SubCUTAneous TID     insulin lispro  0-4 Units SubCUTAneous Nightly    insulin glargine  42 Units SubCUTAneous Nightly     Continuous Infusions:   sodium chloride 25 mL/hr at 24 0306    dextrose       PRN Meds:.melatonin, acetaminophen, nitroGLYCERIN, polyvinyl alcohol, cyclobenzaprine, lidocaine-prilocaine, HYDROcodone-acetaminophen, glucose, dextrose bolus **OR** dextrose bolus, glucagon (rDNA), dextrose, fentanNYL, midodrine    Physical Exam:    VITALS:  BP (!) 116/39   Pulse 85   Temp 98.6 °F (37 °C)   Resp 18   Ht 1.651 m (5' 5\")   Wt 124.2 kg (273 lb 13 oz)   SpO2 91%   BMI 45.56 kg/m²   TEMPERATURE:  Current - Temp: 98.6 °F (37 °C); Max - Temp  Av °F (36.7 °C)  Min: 97.3 °F (36.3 °C)  Max: 98.6 °F (37 °C)  RESPIRATIONS RANGE: Resp  Av.9  Min: 16  Max: 20  PULSE RANGE: Pulse  Av  Min: 73  Max: 102  BLOOD PRESSURE RANGE:  Systolic (24hrs), Av , Min:100 , Max:144   ; Diastolic (24hrs), Av, Min:39, Max:80    PULSE OXIMETRY RANGE: SpO2  Av.5 %  Min: 91 %  Max: 94 %  24HR INTAKE/OUTPUT:  No intake or output data in the 24 hours ending 24 1249    Constitutional: alert, appears stated age, and cooperative    Skin: Skin color, texture, turgor normal. No rashes or

## 2024-08-23 NOTE — PROGRESS NOTES
[facility]   OCCUPATIONAL THERAPY MISSED TREATMENT NOTE   INPATIENT   Date: 24  Patient Name: Estefany Garcia       Room:   MRN: [id number]   Account #: [id number]    : 1960  (64 y.o.)  Gender: female   Diagnosis: NSTEMI, cardiac cath with PCI 24.  ESRD; Fluid overload; Acute exacerbation of chronic back pain.             REASON FOR MISSED TREATMENT:  Patient at testing and/or off the floor   -   Hemodialysis Pt OOR at this time, will continue to follow for OT POC. 1307PM    Electronically signed by DAYANA Romero on 24 at 1:58 PM EDT

## 2024-08-23 NOTE — PROGRESS NOTES
Physical Therapy  Facility/Department: [facility] MED SURG  Daily Treatment Note  NAME: Estefany Garcia  : 1960  MRN: [id number]    Date of Service: 2024    Discharge Recommendations:  Patient would benefit from continued therapy after discharge, Therapy recommended at discharge   PT Equipment Recommendations  Equipment Needed:  (TBD)      Assessment  Activity Tolerance: Patient limited by pain  Equipment Needed:  (TBD)    Plan  Physical Therapy Plan  General Plan:  (5-7x per week)  Specific Instructions for Next Treatment: Increase independence with bed mobilitly and transfer activities, increase dynamic standing and sitting balance, increase ambulation distance with use of RW, introduce and educate pt in exercise program.  Current Treatment Recommendations: Strengthening;Balance training;Functional mobility training;Transfer training;ADL/Self-care training;Gait training;Endurance training;Pain management;Home exercise program;Modalities;Equipment evaluation, education, & procurement;Patient/Caregiver education & training;Safety education & training;Therapeutic activities  PT Plan of Care:  (5-7x per week)    Restrictions  Restrictions/Precautions  Restrictions/Precautions: Contact Precautions, Fall Risk, General Precautions, Bed Alarm, Up as Tolerated (Hemodialysis fistula L arm, R cephalic IV, No BP, IV sticks, venipuncture in L arm, Nasal [place] 2L, MRSA Contact precautions)  Required Braces or Orthoses?: No (denies)  Implants present? : Metal implants (cardiac stent)  Position Activity Restriction  Other position/activity restrictions: 2L O2; Recent hx of fall with R distal radius fracture, No BP, IV sticks, venipuncture in L arm, use lift equipment, MRSA Contact precautions     Subjective   Subjective  Subjective: Pt is pleasant and cooperative with therapy.  Pain: Reports 10/10 pain in her mid-back with frequent muscle spasms throughout session  Orientation  Overall Orientation Status: Within Functional

## 2024-08-23 NOTE — PROGRESS NOTES
[facility]   Progress Note                   Date:   8/23/2024  Patient name: Estefany Garcia  Date of admission:  8/20/2024  7:27 PM  MRN:   [id number]  YOB: 1960  PCP: Zulma Payne, AFSANEH - CNP    Reason for Admission: NSTEMI (non-ST elevated myocardial infarction) (HCC) [I21.4]    Subjective:       Clinical Changes / Abnormalities: Pt seen and examined in the room with RN and family member.  Patient lying in bed on her right side and in significant back pain. Pt denies any CP or sob.  Labs, vitals and tele reviewed. Remains SR on tele. No post cath site issues/concerns.     Medications:   Scheduled Meds:   aspirin  81 mg Oral Daily    busPIRone  10 mg Oral TID    prasugrel  10 mg Oral Daily    venlafaxine  75 mg Oral Daily with breakfast    docusate sodium  100 mg Oral BID    allopurinol  100 mg Oral Daily    sodium bicarbonate  1,300 mg Oral BID    ferrous sulfate  325 mg Oral Daily    atorvastatin  80 mg Oral Nightly    insulin glargine  72 Units SubCUTAneous QAM    carvedilol  3.125 mg Oral BID    potassium chloride  10 mEq Oral BID    bumetanide  3 mg Oral Once per day on Sunday Tuesday Thursday Saturday    lidocaine  1 patch TransDERmal Daily    epoetin raphael-epbx  3,000 Units SubCUTAneous Once per day on Monday Wednesday Friday    insulin lispro  0-8 Units SubCUTAneous TID     insulin lispro  0-4 Units SubCUTAneous Nightly    insulin glargine  42 Units SubCUTAneous Nightly     Continuous Infusions:   sodium chloride 25 mL/hr at 08/22/24 0306    dextrose       CBC:   Recent Labs     08/22/24  0526 08/23/24  0714   WBC 5.0  --    HGB 9.2* 9.3*   *  --      BMP:    Recent Labs     08/22/24  0526 08/23/24  0714   * 133*   K 4.1 4.4   CL 95* 94*   CO2 24 24   BUN 27* 36*   CREATININE 4.7* 5.6*   GLUCOSE 153* 151*     Hepatic:   Recent Labs     08/22/24  0526   AST 22   ALT 17   BILITOT 0.6   ALKPHOS 180*     Troponin:   No results for input(s): \"TROPHS\" in the last 72  myocardial infarction. Findings suggest a high risk of cardiac events.    ECG: Resting ECG demonstrates normal sinus rhythm. Non-specific ST abnormality. Prolonged QT    Stress ECG: The Stress ECG was negative for ischemia.    Stress Test: Blood pressure demonstrated a normal response and heart rate demonstrated a normal response to stress.     FINDINGS:     [Fixed perfusion defects involve the inferior apical and mid segments, inferolateral and anterolateral walls at stress, 24% myocardium at stress.]  Perfusion scores are visually adjusted to account for artifact.     Summed stress score: [19]  Summed rest score: [19]  Summed reversibility score: [0]     Function:  End diastolic volume: [82]mL  Left ventricular ejection fraction: [66]%  Wall motion abnormalities: [Hypokinesis in the lateral]  TID score: [1.35] (scores greater than 1.39 are considered elevated for Lexiscan stress with Tc99m)     Impression  Perfusion: [Fixed perfusion defects in the lateral segments most compatible with infarct. No significant stalin-infarct ischemia.]  Function: [Hypokinesis lateral wall]  Risk stratification: [High]     CATH 11/3/2023   Severe native vessel CAD.   Patent mid RCA stents with 70% stenosis after the stents.  Patent LIMA-LAD.   Patent SVG-OM with diffuse severe disease in OM and not amenable to PCI, similar to before.   SVG-Diagonal 99% mid stenosis, very fibrotic lesion. Multiple high pressure NC balloons used, guide line and body wire, reduced to 0% using 2.5x15 mm Resolute Oynx, PD using 3 mm NC balloon  Mildly reduced LV systolic function.   LVEDP 7 mm Hg. No gradient across the aortic valve.      Recommendations     DAPT and risk factor modifications.   Staged PCI of RCA in 1-2 wks         CATH Conclusion 2/2024      Severe native vessel CAD.   Patent LIMA-LAD. Patent SVG-Diagonal with patent stent.   RCA 70% mid ISR reduced to 0% using high pressure 3 mm NC balloon PTCA. RPDA has 95% ISR reduced to 0% using high  (HCC)     Angina at rest (HCC)     S/P angioplasty with stent     H/O heart artery stent     Abdominal distension     Abnormal stress test     Status post cardiac catheterization     Hx of type 2 diabetes mellitus     Bilateral edema of lower extremity     Closed fracture of right distal radius and ulna, initial encounter     Closed fracture of right wrist     End-stage renal disease needing dialysis (HCC)     Pulmonary congestion      Plan of Treatment:   Post cath as above. EXTENSIVE conversation with pt. And family member regarding need for compliance with DAPT and unable to proceed with any back surgery at this time. Support provided.   Keep K >4 and Mg >2   Continue ASA, BB, Effient and statin  Discharge planning per primary. Plan to f/u in clinic as OP    Kera Durant, APRN - CNP   [facility].  [phone]

## 2024-08-23 NOTE — PROGRESS NOTES
Progress Note  Date:2024       Room:-01  Patient Name:Estefany Garcia     YOB: 1960     Age:64 y.o.        Subjective    Subjective:  Symptoms:  Resolved.  (No shortness of breath, continues to complain of back pain. She continue to request increased doses of pain medication which I continue to refuse. She was seen in dialysis this am.).    Diet:  Adequate intake.    Activity level: Returning to normal (worse with twisting or bending).    Pain:  She complains of pain that is severe.       Review of Systems  Objective         Vitals Last 24 Hours:  TEMPERATURE:  Temp  Av.3 °F (36.8 °C)  Min: 97.3 °F (36.3 °C)  Max: 98.9 °F (37.2 °C)  RESPIRATIONS RANGE: Resp  Av.8  Min: 16  Max: 20  PULSE OXIMETRY RANGE: SpO2  Av.3 %  Min: 91 %  Max: 100 %  PULSE RANGE: Pulse  Av.2  Min: 74  Max: 83  BLOOD PRESSURE RANGE: Systolic (24hrs), Av , Min:100 , Max:122   ; Diastolic (24hrs), Av, Min:45, Max:70    I/O (24Hr):  No intake or output data in the 24 hours ending 24 0658      Objective:  General Appearance:  Comfortable.    Vital signs: (most recent): Blood pressure (!) 104/45, pulse 77, temperature 98.6 °F (37 °C), resp. rate 20, height 1.651 m (5' 5\"), weight 117.8 kg (259 lb 11.2 oz), SpO2 91%.  Vital signs are normal.      Labs/Imaging/Diagnostics    Labs:  CBC:  Recent Labs     24  0526   WBC 5.0   RBC 2.41*   HGB 9.2*   HCT 29.2*   .2*   RDW 19.5*   *     CHEMISTRIES:  Recent Labs     24  0526   *   K 4.1   CL 95*   CO2 24   BUN 27*   CREATININE 4.7*   GLUCOSE 153*     PT/INR:  No results for input(s): \"PROTIME\", \"INR\" in the last 72 hours.    APTT:  No results for input(s): \"APTT\" in the last 72 hours.    LIVER PROFILE:  Recent Labs     24  0526   AST 22   ALT 17   BILITOT 0.6   ALKPHOS 180*         Imaging Last 24 Hours:  XR CHEST PORTABLE    Result Date: 2024  EXAMINATION: ONE XRAY VIEW OF THE CHEST 2024 1:09 pm  COMPARISON: 07/19/2024 HISTORY: ORDERING SYSTEM PROVIDED HISTORY: fluid overload TECHNOLOGIST PROVIDED HISTORY: fluid overload Reason for Exam: Pt c/o fluid overload  x 1 week FINDINGS: Status post median sternotomy.  Cardiomegaly.  Pulmonary vascular congestion. No focal airspace disease.  No pneumothorax.  No pleural effusion.     Cardiomegaly with pulmonary vascular congestion     Assessment//Plan           Hospital Problems             Last Modified POA    * (Principal) NSTEMI (non-ST elevated myocardial infarction) ([facility]) 8/20/2024 Yes    Elevated troponin 8/22/2024 Yes    Hx of coronary artery disease 8/22/2024 Yes    ESRD on hemodialysis ([facility]) 8/23/2024 Yes    Type 2 diabetes mellitus with hyperglycemia, with long-term current use of insulin ([facility]) 8/23/2024 Yes    Hx of CABG 8/22/2024 Yes    Adult BMI 40.0-44.9 kg/sq m ([facility]) 8/23/2024 Yes    Status post coronary angioplasty 8/23/2024 Yes     Assessment & Plan    Acute on chronic diastolic CHF - improved, management per cardiology and nephrology    NSTEMI with CAD - patient s/p heart cath with stent placement, Cardiology managing, on Effient.     Diabetes - home basal/bolus insulin regimen with additional bolus insulin as needed     ESRD on dialysis - Nephrology managing, dialysis schedule is M,W,F     Chronic respiratory failure with hypoxia -stable    Uncontrolled back pain - I changed her frequency of Norco to Q4hrs, she has Fentanyl Q4hrs ordered. Pain management no longer does inpatient consults. Will discharge to her SNF on the same pain medication she was admitted on and she can see pain management as an outpatient; f/u with surgery as was planned before admission to evaluate potentially damaged back hardware, she cannot have any procedure for at least 6 weeks due to being on Effient.    Time spent in pre-visit planning, interview, exam, /education and coordination of care: 45 minutes.      Electronically signed by Niesha Sunshine MD on 8/23/2024

## 2024-08-23 NOTE — PROGRESS NOTES
HEMODIALYSIS POST TREATMENT NOTE    Treatment time ordered: 3.5h    Actual treatment time: 3.5h    UltraFiltration Goal: 3.5l  UltraFiltration Removed: 3.7l      Pre Treatment weight: 124.2kg  Post Treatment weight: 120.5kg  Estimated Dry Weight: 116kg    Access used:     Central Venous Catheter:          Tunneled or Non-tunneled: na           Site: na          Access Flow: na      Internal Access:       AV Fistula or AV Graft: avf         Site: left upper arm       Access Flow: good 400       Sign and symptoms of infection: ilana well       If YES: na    Medications or blood products given: na    Chronic outpatient schedule: [facility]    Chronic outpatient unit: [facility]    Summary of response to treatment: ilana well    Explain if orders NOT met, was physician notified:trinh      ACES flowsheet faxed to patient unit/ placed in patient chart: yes    Post assessment completed: yes    Report given to: katie Whitney rn      * Intra-treatment documented Safety Checks include the followin) Access and face visible at all times.     2) All connections and blood lines are secure with no kinks.     3) NVL alarm engaged.     4) Hemosafe device applied (if applicable).     5) No collapse of Arterial or Venous blood chambers.     6) All blood lines / pump segments in the air detectors.

## 2024-08-23 NOTE — CARE COORDINATION
Writer called Sue [facility] at [phone]  and was advised that an auth was already approved for [facility] [id number].  They are advising patient just needs Discharge instructions and discharge order faxed to them at [phone] and patient can return to [facility].  Tried calling [employer]rol at [place] at [phone] and it goes to Voice mail , Left a message for her to call writer back.     Electronically signed by Anastasiia Martínez RN on 8/23/2024 at 3:54 PM     Writer received a call back from Karol who advised they are calling the insurance company to see what is going on.  Karol advised , that every time they get a pre cert request for this patient  it takes along time and they don't know why.   Karol said she would call back.    Electronically signed by Anastasiia Martínez RN on 8/23/2024 at 4:04 PM

## 2024-08-23 NOTE — PROGRESS NOTES
HEMODIALYSIS PRE-TREATMENT NOTE    Patient Identifiers prior to treatment: name  mrn    Isolation Required: contact                      Isolation Type: esbl       (please document if patient is being managed as a PUI/COVID-19 patient)        Hepatitis status:                           Date Drawn                             Result  Hepatitis B Surface Antigen 3/8/24     neg                     Hepatitis B Surface Antibody 3/8/24 1000 pos        Hepatitis B Core Antibody na na          How was Hepatitis Status verified: epic     Was a copy of the labs you documented provided to facility for the patient's chart: yes    Hemodialysis orders verified: yes    Access Within normal limits ( I.e. s/s of infection,...): yes     Pre-Assessment completed: yes    Pre-dialysis report received from: [id number]                      Time: Cody Whitney rn

## 2024-08-23 NOTE — PROGRESS NOTES
08/23/24 1429   Encounter Summary   Encounter Overview/Reason Volunteer Encounter   Service Provided For Patient   Last Encounter  08/23/24   Assessment/Intervention/Outcome   Intervention Prayer (assurance of)/[place]

## 2024-08-23 NOTE — PROGRESS NOTES
Spiritual Health Assessment/Progress Note  [facility]    Loneliness/Social Isolation,  ,  ,      Name: Estefany Garcia MRN: [id number]    Age: 64 y.o.     Sex: female   Language: English   Shinto: Voodoo   NSTEMI (non-ST elevated myocardial infarction) (HCC)     Date: 8/23/2024            Total Time Calculated: 15 min              Spiritual Assessment began in [facility]        Referral/Consult From: Rounding   Encounter Overview/Reason: Loneliness/Social Isolation  Service Provided For: Patient    Pt is known to writer from previous admissions. Pt shared her day and its challenges and noted how frustrating it is to be chronically sick; yet pt is also pleasant and optimistic in her discussions.     Mary, Belief, Meaning:   Patient identifies as spiritual, is connected with a mary tradition or spiritual practice, and has beliefs or practices that help with coping during difficult times  Family/Friends No family/friends present      Importance and Influence:  Patient has spiritual/personal beliefs that influence decisions regarding their health  Family/Friends no family/friends present    Community:  Patient is connected with a spiritual community and feels well-supported. Support system includes: Extended family  Family/Friends Other: no family/friends present    Assessment and Plan of Care:     Patient Interventions include: Facilitated expression of thoughts and feelings, Explored spiritual coping/struggle/distress and theological reflection, Affirmed coping skills/support systems, and Provided sacramental/Amish ritual  Family/Friends Interventions include: Other: no family/friends present    Patient Plan of Care: Spiritual Care available upon further referral  Family/Friends Plan of Care: Other: no family/friends present    Electronically signed by Chaplain Kishan on 8/23/2024 at 5:49 PM

## 2024-08-24 VITALS
HEIGHT: 65 IN | BODY MASS INDEX: 44.26 KG/M2 | SYSTOLIC BLOOD PRESSURE: 123 MMHG | RESPIRATION RATE: 17 BRPM | OXYGEN SATURATION: 94 % | WEIGHT: 265.65 LBS | HEART RATE: 75 BPM | TEMPERATURE: 98.4 F | DIASTOLIC BLOOD PRESSURE: 53 MMHG

## 2024-08-24 LAB
GLUCOSE BLD-MCNC: 193 MG/DL (ref 65–105)
GLUCOSE BLD-MCNC: 72 MG/DL (ref 65–105)

## 2024-08-24 PROCEDURE — 6360000002 HC RX W HCPCS: Performed by: FAMILY MEDICINE

## 2024-08-24 PROCEDURE — 82947 ASSAY GLUCOSE BLOOD QUANT: CPT

## 2024-08-24 PROCEDURE — 6370000000 HC RX 637 (ALT 250 FOR IP): Performed by: FAMILY MEDICINE

## 2024-08-24 RX ADMIN — VENLAFAXINE HYDROCHLORIDE 75 MG: 75 CAPSULE, EXTENDED RELEASE ORAL at 08:54

## 2024-08-24 RX ADMIN — BUSPIRONE HYDROCHLORIDE 10 MG: 10 TABLET ORAL at 08:54

## 2024-08-24 RX ADMIN — INSULIN GLARGINE 72 UNITS: 100 INJECTION, SOLUTION SUBCUTANEOUS at 08:55

## 2024-08-24 RX ADMIN — BUMETANIDE 3 MG: 1 TABLET ORAL at 08:53

## 2024-08-24 RX ADMIN — PRASUGREL 10 MG: 10 TABLET, FILM COATED ORAL at 11:05

## 2024-08-24 RX ADMIN — FERROUS SULFATE TAB 325 MG (65 MG ELEMENTAL FE) 325 MG: 325 (65 FE) TAB at 08:54

## 2024-08-24 RX ADMIN — HYDROCODONE BITARTRATE AND ACETAMINOPHEN 1 TABLET: 5; 325 TABLET ORAL at 09:53

## 2024-08-24 RX ADMIN — FENTANYL CITRATE 25 MCG: 0.05 INJECTION, SOLUTION INTRAMUSCULAR; INTRAVENOUS at 06:24

## 2024-08-24 RX ADMIN — ALLOPURINOL 100 MG: 100 TABLET ORAL at 08:54

## 2024-08-24 RX ADMIN — SODIUM BICARBONATE 1300 MG: 650 TABLET ORAL at 08:53

## 2024-08-24 RX ADMIN — CARVEDILOL 3.12 MG: 3.12 TABLET, FILM COATED ORAL at 08:54

## 2024-08-24 RX ADMIN — DOCUSATE SODIUM 100 MG: 100 CAPSULE, LIQUID FILLED ORAL at 08:54

## 2024-08-24 RX ADMIN — CYCLOBENZAPRINE 5 MG: 10 TABLET, FILM COATED ORAL at 08:54

## 2024-08-24 RX ADMIN — POTASSIUM CHLORIDE 10 MEQ: 750 CAPSULE, EXTENDED RELEASE ORAL at 08:53

## 2024-08-24 RX ADMIN — ASPIRIN 81 MG: 81 TABLET, CHEWABLE ORAL at 08:54

## 2024-08-24 RX ADMIN — FENTANYL CITRATE 25 MCG: 0.05 INJECTION, SOLUTION INTRAMUSCULAR; INTRAVENOUS at 11:05

## 2024-08-24 ASSESSMENT — PAIN SCALES - GENERAL
PAINLEVEL_OUTOF10: 8

## 2024-08-24 ASSESSMENT — PAIN DESCRIPTION - LOCATION: LOCATION: ABDOMEN

## 2024-08-24 ASSESSMENT — PAIN - FUNCTIONAL ASSESSMENT: PAIN_FUNCTIONAL_ASSESSMENT: PREVENTS OR INTERFERES SOME ACTIVE ACTIVITIES AND ADLS

## 2024-08-24 ASSESSMENT — PAIN DESCRIPTION - DESCRIPTORS: DESCRIPTORS: STABBING

## 2024-08-24 NOTE — CARE COORDINATION
ONGOING DISCHARGE PLAN:    Writer called central intake [phone] and left a message to see if auth was approved. Writer then attempted to call phone number listed by Cayla AGUAYO, lead case manager [phone] and goes to voice mail with no identifiers. Message was not left on that number.     Cannot discharge until confirmation is approved.     Will continue to follow for additional discharge needs.    If patient is discharged prior to next notation, then this note serves as note for discharge by case management.    Electronically signed by Jasmin Pfeiffer RN on 8/24/2024 at 10:45 AM

## 2024-08-24 NOTE — CARE COORDINATION
Writer updated patient about discharge today. She has wheelchair in the room.     Transport request via wheelchair faxed to [facility]. Time of 1 pm requested.     IMM letter provided to patient.  Patient offered four hours to make informed decision regarding appeal process; patient agreeable to discharge.    Electronically signed by Jasmin Pfeiffer RN on 8/24/2024 at 11:08 AM

## 2024-08-24 NOTE — CARE COORDINATION
[facility] Date/Time: 2024    Hospital Account: [id number]    MRN: [id number]    Patient: Sandra Garcia    Contact Serial #: [id number]      ENCOUNTER          Patient Class: I Private Enc?  No Unit RM BD: [facility]    Hospital Service: MED   Encounter DX: NSTEMI (non-ST elevated *   ADM Provider: Niesha Sunshine MD   Procedure:     ATT Provider: Niesha Sunshine MD   REF Provider:        Admission DX: NSTEMI (non-ST elevated myocardial infarction) (HCC) and DX codes: I21.4      PATIENT                 Name: Sandra Garcia : 1960 (64 yrs)   Address: [facility] [address] Sex: Female   City: [place] [address]         Marital Status: Single   Employer: [employer]         Mu-ism: Alevism   Primary Care Provider: Zulma Payne APRN - C*         Primary Phone: [phone]   EMERGENCY CONTACT   Contact Name Legal Guardian? Relationship to Patient Home Phone Work Phone   1. Hailey Guido  2. Nancy Perdomo  No Brother/Sister  Brother/Sister [phone]              GUARANTOR            Guarantor: Sandra Garcia     : 1960   Address: [facility] [address] Sex: [place] [address]     Relation to Patient: Self       Home Phone: [phone]   Guarantor ID: [id number]       Work Phone:     Guarantor Employer: [employer]         Status: RETIRED      COVERAGE        PRIMARY INSURANCE   Payor: DALTON SWAIN DUAL* Plan: DALTON SWAIN DUAL   Payor Address: [place] [address]       Group Number: [id number] Insurance Type: INDEMNITY   Subscriber Name: SANDRA GARCIA Subscriber : 1960   Subscriber ID: [id number] Pat. Rel. to Sub: Self   SECONDARY INSURANCE   Payor:   Plan:     Payor Address:  ,           Group Number:   Insurance Type:     Subscriber Name:   Subscriber :     Subscriber ID:   Pat. Rel. to Sub:          CSN: [id number]   Medication List    CONTINUE taking these medications    CONTINUE taking these  tablets by mouth twice daily   venlafaxine 75 MG extended release capsule  Commonly known as: EFFEXOR XR  TAKE 1 CAPSULE BY MOUTH ONCE DAILY WITH BREAKFAST    very important  * This list has 2 medication(s) that are the same as other medications prescribed for you. Read the directions carefully, and ask your doctor or other care provider to review them with you.     Where to Get Your Medications      These medications were sent to [facility] [address] [place] [address] -  [phone] - F [phone]  [address] [place] [id number]  Phone: [phone]       lidocaine-prilocaine 2.5-2.5 % cream      You can get these medications from any pharmacy    Bring a paper prescription for each of these medications      HYDROcodone-acetaminophen 5-325 MG per tablet      Printing Report    Report Name Print   Discharge Meds Print   Continuity of Care Form    Patient Name: Estefany Garcia   :  1960  MRN:  [id number]    Admit date:  2024  Discharge date:  2024    Code Status Order: Full Code   Advance Directives:   Advance Care Flowsheet Documentation             Admitting Physician:  Niesha Sunshine MD  PCP: Zulma Payne APRN - CNP    Discharging Nurse: [facility] Unit/Room#: 2124/2124-01  Discharging Unit Phone Number: [phone]    Emergency Contact:   Extended Emergency Contact Information  Primary Emergency Contact: Hailey Guido  Home Phone: [phone]  Relation: Brother/Sister  Secondary Emergency Contact: Nancy Perdomo  Home Phone: [phone]  Relation: Brother/Sister    Past Surgical History:  Past Surgical History:   Procedure Laterality Date    ANKLE FRACTURE SURGERY      AV FISTULA CREATION  2021    BACK SURGERY      BREAST REDUCTION SURGERY      BREAST SURGERY      CARDIAC CATHETERIZATION      MVD  /  CT CONSULT    CARDIAC PROCEDURE N/A 2023    ron / Coronary angiography / op [facility] performed by Jairo Hurley MD at [facility] CARDIAC CATH  22 Culture, Urine        VRE 22 Culture, Urine        Urine-       ESBL (Extended Spectrum Beta Lactamase) 22 Culture, Urine        MDRO (multi-drug resistant organism) 22 Culture, Urine        VRE 22 Culture, Urine        MRSA 20  Thao Harris        Added from external infection.    Resolved    COVID-19 22 COVID-19   22 Infection                        Nurse Assessment:  Last Vital Signs: BP 91/63   Pulse 77   Temp 98.1 °F (36.7 °C) (Oral)   Resp 16   Ht 1.651 m (5' 5\")   Wt 120.3 kg (265 lb 3.4 oz)   SpO2 92%   BMI 44.13 kg/m²     Last documented pain score (0-10 scale): Pain Level: 10  Last Weight:   Wt Readings from Last 1 Encounters:   24 120.3 kg (265 lb 3.4 oz)     Mental Status:  oriented, alert, coherent, and logical    IV Access:  - None    Nursing Mobility/ADLs:  Walking   Independent  Transfer  Independent  Bathing  Assisted  Dressing  Assisted  Toileting  Independent  Feeding  Independent  Med Admin  Independent  Med Delivery   whole    Wound Care Documentation and Therapy:  Wound 24 Radial Right (Active)   Wound Etiology Traumatic 24 1600   Dressing Status Clean;Dry;Intact 24 1600   Dressing/Treatment Splint 24 1600   Wound Assessment Other (Comment) 24 1600   Drainage Amount None (dry) 24 1600   Odor None 24 1600   Elena-wound Assessment Other (Comment) 24 1600   Margins Other (Comment) 24 0800   Number of days: 45        Elimination:  Continence:   Bowel: Yes  Bladder: Yes  Urinary Catheter: None   Colostomy/Ileostomy/Ileal Conduit: No       Date of Last BM: 2024    Intake/Output Summary (Last 24 hours) at 2024 1321  Last data filed at 2024 0831  Gross per 24 hour   Intake 240 ml   Output --   Net 240 ml     No intake/output data recorded.    Safety  and that she requires Skilled Nursing Facility for greater 30 days.     Update Admission H&P: No change in H&P    PHYSICIAN SIGNATURE:  Electronically signed by Niesha Sunshine MD on 8/23/24 at 10:06 AM EDT

## 2024-08-24 NOTE — PLAN OF CARE
Problem: Safety - Adult  Goal: Free from fall injury  Outcome: Progressing     Problem: Discharge Planning  Goal: Discharge to home or other facility with appropriate resources  Outcome: Progressing     Problem: Pain  Goal: Verbalizes/displays adequate comfort level or baseline comfort level  8/24/2024 0541 by Sarmad Del Real RN  Outcome: Progressing

## 2024-08-24 NOTE — CARE COORDINATION
Writer received a call from [employer] of [place]. She states patient is approved thru 8/27.    CALL REPORT [phone]    AVS FAXED [phone].    Electronically signed by Jasmin Pfeiffer RN on 8/24/2024 at 10:55 AM

## 2024-08-24 NOTE — PLAN OF CARE
Problem: Safety - Adult  Goal: Free from fall injury  8/24/2024 1336 by Cody Whitney RN  Outcome: Adequate for Discharge  8/24/2024 1336 by Cody Whitney RN  Outcome: Progressing  8/24/2024 0541 by Sarmad Del Real RN  Outcome: Progressing     Problem: Discharge Planning  Goal: Discharge to home or other facility with appropriate resources  8/24/2024 1336 by Cody Whitney RN  Outcome: Adequate for Discharge  8/24/2024 0541 by Sarmad Del Real RN  Outcome: Progressing     Problem: Pain  Goal: Verbalizes/displays adequate comfort level or baseline comfort level  8/24/2024 1336 by Cody Whitney RN  Outcome: Adequate for Discharge  8/24/2024 0541 by Sarmad Del Real RN  Outcome: Progressing     Problem: Skin/Tissue Integrity  Goal: Absence of new skin breakdown  Description: 1.  Monitor for areas of redness and/or skin breakdown  2.  Assess vascular access sites hourly  3.  Every 4-6 hours minimum:  Change oxygen saturation probe site  4.  Every 4-6 hours:  If on nasal continuous positive airway pressure, respiratory therapy assess nares and determine need for appliance change or resting period.  Outcome: Adequate for Discharge     Problem: Chronic Conditions and Co-morbidities  Goal: Patient's chronic conditions and co-morbidity symptoms are monitored and maintained or improved  Outcome: Adequate for Discharge     Problem: Respiratory - Adult  Goal: Achieves optimal ventilation and oxygenation  Outcome: Adequate for Discharge     Problem: Cardiovascular - Adult  Goal: Maintains optimal cardiac output and hemodynamic stability  Outcome: Adequate for Discharge  Goal: Absence of cardiac dysrhythmias or at baseline  Outcome: Adequate for Discharge     Problem: Skin/Tissue Integrity - Adult  Goal: Skin integrity remains intact  Outcome: Adequate for Discharge  Goal: Oral mucous membranes remain intact  Outcome: Adequate for Discharge     Problem: Musculoskeletal - Adult  Goal: Return mobility to safest level of function  Outcome:

## 2024-08-28 ENCOUNTER — APPOINTMENT (OUTPATIENT)
Dept: GENERAL RADIOLOGY | Age: 64
End: 2024-08-28
Attending: EMERGENCY MEDICINE
Payer: COMMERCIAL

## 2024-08-28 ENCOUNTER — HOSPITAL ENCOUNTER (OUTPATIENT)
Age: 64
Setting detail: OBSERVATION
Discharge: HOME HEALTH | End: 2024-09-11
Attending: EMERGENCY MEDICINE | Admitting: STUDENT IN AN ORGANIZED HEALTH CARE EDUCATION/TRAINING PROGRAM
Payer: COMMERCIAL

## 2024-08-28 DIAGNOSIS — M48.062 SPINAL STENOSIS OF LUMBAR REGION WITH NEUROGENIC CLAUDICATION: ICD-10-CM

## 2024-08-28 DIAGNOSIS — N18.6 ESRD (END STAGE RENAL DISEASE) ON DIALYSIS (HCC): ICD-10-CM

## 2024-08-28 DIAGNOSIS — E87.70 HYPERVOLEMIA, UNSPECIFIED HYPERVOLEMIA TYPE: ICD-10-CM

## 2024-08-28 DIAGNOSIS — M79.661 RIGHT CALF PAIN: ICD-10-CM

## 2024-08-28 DIAGNOSIS — R09.02 HYPOXEMIA: ICD-10-CM

## 2024-08-28 DIAGNOSIS — Z99.2 ESRD (END STAGE RENAL DISEASE) ON DIALYSIS (HCC): ICD-10-CM

## 2024-08-28 DIAGNOSIS — R07.9 CHEST PAIN, UNSPECIFIED TYPE: Primary | ICD-10-CM

## 2024-08-28 LAB
ALBUMIN SERPL-MCNC: 3.7 G/DL (ref 3.5–5.2)
ALP SERPL-CCNC: 146 U/L (ref 35–104)
ALT SERPL-CCNC: 12 U/L (ref 5–33)
ANION GAP SERPL CALCULATED.3IONS-SCNC: 15 MMOL/L (ref 9–17)
AST SERPL-CCNC: 14 U/L
BASOPHILS # BLD: 0.1 K/UL (ref 0–0.2)
BASOPHILS NFR BLD: 1 % (ref 0–2)
BILIRUB SERPL-MCNC: 0.6 MG/DL (ref 0.3–1.2)
BUN SERPL-MCNC: 35 MG/DL (ref 8–23)
CALCIUM SERPL-MCNC: 9 MG/DL (ref 8.6–10.4)
CHLORIDE SERPL-SCNC: 92 MMOL/L (ref 98–107)
CO2 SERPL-SCNC: 25 MMOL/L (ref 20–31)
CREAT SERPL-MCNC: 4.3 MG/DL (ref 0.5–0.9)
EOSINOPHIL # BLD: 0.1 K/UL (ref 0–0.4)
EOSINOPHILS RELATIVE PERCENT: 2 % (ref 0–4)
ERYTHROCYTE [DISTWIDTH] IN BLOOD BY AUTOMATED COUNT: 18 % (ref 11.5–14.9)
GFR, ESTIMATED: 11 ML/MIN/1.73M2
GLUCOSE BLD-MCNC: 151 MG/DL (ref 65–105)
GLUCOSE BLD-MCNC: 173 MG/DL (ref 65–105)
GLUCOSE SERPL-MCNC: 215 MG/DL (ref 70–99)
HCT VFR BLD AUTO: 30.2 % (ref 36–46)
HGB BLD-MCNC: 9.9 G/DL (ref 12–16)
INR PPP: 1.1
LYMPHOCYTES NFR BLD: 0.6 K/UL (ref 1–4.8)
LYMPHOCYTES RELATIVE PERCENT: 10 % (ref 24–44)
MAGNESIUM SERPL-MCNC: 2.4 MG/DL (ref 1.6–2.6)
MCH RBC QN AUTO: 37 PG (ref 26–34)
MCHC RBC AUTO-ENTMCNC: 32.6 G/DL (ref 31–37)
MCV RBC AUTO: 113.4 FL (ref 80–100)
MONOCYTES NFR BLD: 0.4 K/UL (ref 0.1–1.3)
MONOCYTES NFR BLD: 8 % (ref 1–7)
NEUTROPHILS NFR BLD: 79 % (ref 36–66)
NEUTS SEG NFR BLD: 4.4 K/UL (ref 1.3–9.1)
PLATELET # BLD AUTO: 175 K/UL (ref 150–450)
PMV BLD AUTO: 8 FL (ref 6–12)
POTASSIUM SERPL-SCNC: 4.1 MMOL/L (ref 3.7–5.3)
PROT SERPL-MCNC: 6.9 G/DL (ref 6.4–8.3)
PROTHROMBIN TIME: 14.7 SEC (ref 11.8–14.6)
RBC # BLD AUTO: 2.66 M/UL (ref 4–5.2)
SODIUM SERPL-SCNC: 132 MMOL/L (ref 135–144)
TROPONIN I SERPL HS-MCNC: 116 NG/L (ref 0–14)
TROPONIN I SERPL HS-MCNC: 122 NG/L (ref 0–14)
WBC OTHER # BLD: 5.5 K/UL (ref 3.5–11)

## 2024-08-28 PROCEDURE — 93005 ELECTROCARDIOGRAM TRACING: CPT | Performed by: EMERGENCY MEDICINE

## 2024-08-28 PROCEDURE — 84484 ASSAY OF TROPONIN QUANT: CPT

## 2024-08-28 PROCEDURE — 96372 THER/PROPH/DIAG INJ SC/IM: CPT

## 2024-08-28 PROCEDURE — G0378 HOSPITAL OBSERVATION PER HR: HCPCS

## 2024-08-28 PROCEDURE — 90935 HEMODIALYSIS ONE EVALUATION: CPT

## 2024-08-28 PROCEDURE — 6370000000 HC RX 637 (ALT 250 FOR IP): Performed by: STUDENT IN AN ORGANIZED HEALTH CARE EDUCATION/TRAINING PROGRAM

## 2024-08-28 PROCEDURE — 99223 1ST HOSP IP/OBS HIGH 75: CPT | Performed by: STUDENT IN AN ORGANIZED HEALTH CARE EDUCATION/TRAINING PROGRAM

## 2024-08-28 PROCEDURE — 83735 ASSAY OF MAGNESIUM: CPT

## 2024-08-28 PROCEDURE — 6360000002 HC RX W HCPCS: Performed by: STUDENT IN AN ORGANIZED HEALTH CARE EDUCATION/TRAINING PROGRAM

## 2024-08-28 PROCEDURE — 6360000002 HC RX W HCPCS: Performed by: EMERGENCY MEDICINE

## 2024-08-28 PROCEDURE — 99285 EMERGENCY DEPT VISIT HI MDM: CPT

## 2024-08-28 PROCEDURE — 85025 COMPLETE CBC W/AUTO DIFF WBC: CPT

## 2024-08-28 PROCEDURE — 96374 THER/PROPH/DIAG INJ IV PUSH: CPT

## 2024-08-28 PROCEDURE — 71045 X-RAY EXAM CHEST 1 VIEW: CPT

## 2024-08-28 PROCEDURE — 2580000003 HC RX 258: Performed by: STUDENT IN AN ORGANIZED HEALTH CARE EDUCATION/TRAINING PROGRAM

## 2024-08-28 PROCEDURE — 82947 ASSAY GLUCOSE BLOOD QUANT: CPT

## 2024-08-28 PROCEDURE — 6370000000 HC RX 637 (ALT 250 FOR IP): Performed by: EMERGENCY MEDICINE

## 2024-08-28 PROCEDURE — 99222 1ST HOSP IP/OBS MODERATE 55: CPT | Performed by: STUDENT IN AN ORGANIZED HEALTH CARE EDUCATION/TRAINING PROGRAM

## 2024-08-28 PROCEDURE — 80053 COMPREHEN METABOLIC PANEL: CPT

## 2024-08-28 PROCEDURE — 85610 PROTHROMBIN TIME: CPT

## 2024-08-28 PROCEDURE — 36415 COLL VENOUS BLD VENIPUNCTURE: CPT

## 2024-08-28 RX ORDER — ACETAMINOPHEN 325 MG/1
650 TABLET ORAL EVERY 6 HOURS PRN
Status: DISCONTINUED | OUTPATIENT
Start: 2024-08-28 | End: 2024-09-11 | Stop reason: HOSPADM

## 2024-08-28 RX ORDER — SODIUM BICARBONATE 650 MG/1
1300 TABLET ORAL 2 TIMES DAILY
Status: DISCONTINUED | OUTPATIENT
Start: 2024-08-28 | End: 2024-09-11 | Stop reason: HOSPADM

## 2024-08-28 RX ORDER — HEPARIN SODIUM 5000 [USP'U]/ML
5000 INJECTION, SOLUTION INTRAVENOUS; SUBCUTANEOUS EVERY 8 HOURS SCHEDULED
Status: DISCONTINUED | OUTPATIENT
Start: 2024-08-28 | End: 2024-09-11 | Stop reason: HOSPADM

## 2024-08-28 RX ORDER — ACETAMINOPHEN 650 MG/1
650 SUPPOSITORY RECTAL EVERY 6 HOURS PRN
Status: DISCONTINUED | OUTPATIENT
Start: 2024-08-28 | End: 2024-09-11 | Stop reason: HOSPADM

## 2024-08-28 RX ORDER — HYDROCODONE BITARTRATE AND ACETAMINOPHEN 5; 325 MG/1; MG/1
1 TABLET ORAL
Status: DISCONTINUED | OUTPATIENT
Start: 2024-08-28 | End: 2024-08-29

## 2024-08-28 RX ORDER — INSULIN GLARGINE 100 [IU]/ML
42 INJECTION, SOLUTION SUBCUTANEOUS NIGHTLY
Status: DISCONTINUED | OUTPATIENT
Start: 2024-08-28 | End: 2024-09-11 | Stop reason: HOSPADM

## 2024-08-28 RX ORDER — ASPIRIN 81 MG/1
81 TABLET, CHEWABLE ORAL DAILY
Status: DISCONTINUED | OUTPATIENT
Start: 2024-08-28 | End: 2024-09-11 | Stop reason: HOSPADM

## 2024-08-28 RX ORDER — CYCLOBENZAPRINE HCL 10 MG
5 TABLET ORAL 3 TIMES DAILY PRN
Status: DISCONTINUED | OUTPATIENT
Start: 2024-08-28 | End: 2024-09-11 | Stop reason: HOSPADM

## 2024-08-28 RX ORDER — ALLOPURINOL 100 MG/1
100 TABLET ORAL DAILY
Status: DISCONTINUED | OUTPATIENT
Start: 2024-08-28 | End: 2024-09-11 | Stop reason: HOSPADM

## 2024-08-28 RX ORDER — BUMETANIDE 1 MG/1
3 TABLET ORAL
Status: DISCONTINUED | OUTPATIENT
Start: 2024-08-29 | End: 2024-09-11 | Stop reason: HOSPADM

## 2024-08-28 RX ORDER — PROMETHAZINE HYDROCHLORIDE 25 MG/1
12.5 TABLET ORAL EVERY 6 HOURS PRN
Status: DISCONTINUED | OUTPATIENT
Start: 2024-08-28 | End: 2024-09-11 | Stop reason: HOSPADM

## 2024-08-28 RX ORDER — SODIUM CHLORIDE 9 MG/ML
INJECTION, SOLUTION INTRAVENOUS PRN
Status: DISCONTINUED | OUTPATIENT
Start: 2024-08-28 | End: 2024-09-11 | Stop reason: HOSPADM

## 2024-08-28 RX ORDER — MIDODRINE HYDROCHLORIDE 5 MG/1
5 TABLET ORAL PRN
Status: DISCONTINUED | OUTPATIENT
Start: 2024-08-28 | End: 2024-09-11 | Stop reason: HOSPADM

## 2024-08-28 RX ORDER — LANOLIN ALCOHOL/MO/W.PET/CERES
3 CREAM (GRAM) TOPICAL NIGHTLY PRN
Status: DISCONTINUED | OUTPATIENT
Start: 2024-08-28 | End: 2024-09-05

## 2024-08-28 RX ORDER — CARVEDILOL 3.12 MG/1
3.12 TABLET ORAL 2 TIMES DAILY
Status: DISCONTINUED | OUTPATIENT
Start: 2024-08-28 | End: 2024-09-11 | Stop reason: HOSPADM

## 2024-08-28 RX ORDER — DOCUSATE SODIUM 100 MG/1
100 CAPSULE, LIQUID FILLED ORAL 2 TIMES DAILY
Status: DISCONTINUED | OUTPATIENT
Start: 2024-08-28 | End: 2024-09-11 | Stop reason: HOSPADM

## 2024-08-28 RX ORDER — SODIUM CHLORIDE 0.9 % (FLUSH) 0.9 %
5-40 SYRINGE (ML) INJECTION PRN
Status: DISCONTINUED | OUTPATIENT
Start: 2024-08-28 | End: 2024-09-11 | Stop reason: HOSPADM

## 2024-08-28 RX ORDER — INSULIN LISPRO 100 [IU]/ML
0-4 INJECTION, SOLUTION INTRAVENOUS; SUBCUTANEOUS NIGHTLY
Status: DISCONTINUED | OUTPATIENT
Start: 2024-08-28 | End: 2024-09-11 | Stop reason: HOSPADM

## 2024-08-28 RX ORDER — BUSPIRONE HYDROCHLORIDE 10 MG/1
10 TABLET ORAL 3 TIMES DAILY
Status: DISCONTINUED | OUTPATIENT
Start: 2024-08-28 | End: 2024-09-11 | Stop reason: HOSPADM

## 2024-08-28 RX ORDER — INSULIN LISPRO 100 [IU]/ML
15 INJECTION, SOLUTION INTRAVENOUS; SUBCUTANEOUS
Status: DISCONTINUED | OUTPATIENT
Start: 2024-08-28 | End: 2024-09-11 | Stop reason: HOSPADM

## 2024-08-28 RX ORDER — ATORVASTATIN CALCIUM 80 MG/1
80 TABLET, FILM COATED ORAL NIGHTLY
Status: DISCONTINUED | OUTPATIENT
Start: 2024-08-28 | End: 2024-09-11 | Stop reason: HOSPADM

## 2024-08-28 RX ORDER — VENLAFAXINE HYDROCHLORIDE 75 MG/1
75 CAPSULE, EXTENDED RELEASE ORAL
Status: DISCONTINUED | OUTPATIENT
Start: 2024-08-29 | End: 2024-09-11 | Stop reason: HOSPADM

## 2024-08-28 RX ORDER — SEVELAMER CARBONATE 800 MG/1
1600 TABLET, FILM COATED ORAL
Status: DISCONTINUED | OUTPATIENT
Start: 2024-08-28 | End: 2024-09-11 | Stop reason: HOSPADM

## 2024-08-28 RX ORDER — BISACODYL 5 MG/1
5 TABLET, DELAYED RELEASE ORAL DAILY PRN
Status: DISCONTINUED | OUTPATIENT
Start: 2024-08-28 | End: 2024-09-11 | Stop reason: HOSPADM

## 2024-08-28 RX ORDER — ASPIRIN 81 MG/1
243 TABLET, CHEWABLE ORAL ONCE
Status: COMPLETED | OUTPATIENT
Start: 2024-08-28 | End: 2024-08-28

## 2024-08-28 RX ORDER — DEXTROSE MONOHYDRATE 100 MG/ML
INJECTION, SOLUTION INTRAVENOUS CONTINUOUS PRN
Status: DISCONTINUED | OUTPATIENT
Start: 2024-08-28 | End: 2024-09-11 | Stop reason: HOSPADM

## 2024-08-28 RX ORDER — FERROUS SULFATE 325(65) MG
325 TABLET ORAL DAILY
Status: DISCONTINUED | OUTPATIENT
Start: 2024-08-28 | End: 2024-09-11 | Stop reason: HOSPADM

## 2024-08-28 RX ORDER — HYDROCODONE BITARTRATE AND ACETAMINOPHEN 5; 325 MG/1; MG/1
1 TABLET ORAL EVERY 4 HOURS PRN
COMMUNITY

## 2024-08-28 RX ORDER — INSULIN LISPRO 100 [IU]/ML
0-16 INJECTION, SOLUTION INTRAVENOUS; SUBCUTANEOUS
Status: DISCONTINUED | OUTPATIENT
Start: 2024-08-28 | End: 2024-09-11 | Stop reason: HOSPADM

## 2024-08-28 RX ORDER — INSULIN GLARGINE 100 [IU]/ML
72 INJECTION, SOLUTION SUBCUTANEOUS EVERY MORNING
Status: DISCONTINUED | OUTPATIENT
Start: 2024-08-29 | End: 2024-09-11 | Stop reason: HOSPADM

## 2024-08-28 RX ORDER — ONDANSETRON 2 MG/ML
4 INJECTION INTRAMUSCULAR; INTRAVENOUS EVERY 6 HOURS PRN
Status: DISCONTINUED | OUTPATIENT
Start: 2024-08-28 | End: 2024-09-11 | Stop reason: HOSPADM

## 2024-08-28 RX ORDER — MORPHINE SULFATE 4 MG/ML
4 INJECTION, SOLUTION INTRAMUSCULAR; INTRAVENOUS ONCE
Status: COMPLETED | OUTPATIENT
Start: 2024-08-28 | End: 2024-08-28

## 2024-08-28 RX ORDER — SODIUM CHLORIDE 0.9 % (FLUSH) 0.9 %
5-40 SYRINGE (ML) INJECTION EVERY 12 HOURS SCHEDULED
Status: DISCONTINUED | OUTPATIENT
Start: 2024-08-28 | End: 2024-09-11 | Stop reason: HOSPADM

## 2024-08-28 RX ORDER — PRASUGREL 10 MG/1
10 TABLET, FILM COATED ORAL DAILY
Status: DISCONTINUED | OUTPATIENT
Start: 2024-08-28 | End: 2024-09-11 | Stop reason: HOSPADM

## 2024-08-28 RX ADMIN — ATORVASTATIN CALCIUM 80 MG: 80 TABLET, FILM COATED ORAL at 22:11

## 2024-08-28 RX ADMIN — ACETAMINOPHEN 650 MG: 325 TABLET ORAL at 17:42

## 2024-08-28 RX ADMIN — Medication 3 MG: at 22:12

## 2024-08-28 RX ADMIN — SEVELAMER CARBONATE 1600 MG: 800 TABLET, FILM COATED ORAL at 17:25

## 2024-08-28 RX ADMIN — HEPARIN SODIUM 5000 UNITS: 5000 INJECTION INTRAVENOUS; SUBCUTANEOUS at 22:10

## 2024-08-28 RX ADMIN — INSULIN LISPRO 15 UNITS: 100 INJECTION, SOLUTION INTRAVENOUS; SUBCUTANEOUS at 17:24

## 2024-08-28 RX ADMIN — CARVEDILOL 3.12 MG: 3.12 TABLET, FILM COATED ORAL at 22:12

## 2024-08-28 RX ADMIN — DOCUSATE SODIUM 100 MG: 100 CAPSULE, LIQUID FILLED ORAL at 22:11

## 2024-08-28 RX ADMIN — MORPHINE SULFATE 4 MG: 4 INJECTION, SOLUTION INTRAMUSCULAR; INTRAVENOUS at 11:33

## 2024-08-28 RX ADMIN — HYDROCODONE BITARTRATE AND ACETAMINOPHEN 1 TABLET: 5; 325 TABLET ORAL at 22:12

## 2024-08-28 RX ADMIN — ASPIRIN 81 MG 243 MG: 81 TABLET ORAL at 11:33

## 2024-08-28 RX ADMIN — INSULIN GLARGINE 42 UNITS: 100 INJECTION, SOLUTION SUBCUTANEOUS at 22:10

## 2024-08-28 RX ADMIN — SODIUM BICARBONATE 1300 MG: 650 TABLET ORAL at 22:13

## 2024-08-28 RX ADMIN — BUSPIRONE HYDROCHLORIDE 10 MG: 10 TABLET ORAL at 22:12

## 2024-08-28 RX ADMIN — SODIUM CHLORIDE, PRESERVATIVE FREE 10 ML: 5 INJECTION INTRAVENOUS at 22:14

## 2024-08-28 ASSESSMENT — PAIN DESCRIPTION - ORIENTATION: ORIENTATION: RIGHT;LEFT

## 2024-08-28 ASSESSMENT — PAIN SCALES - GENERAL
PAINLEVEL_OUTOF10: 10
PAINLEVEL_OUTOF10: 3
PAINLEVEL_OUTOF10: 8
PAINLEVEL_OUTOF10: 5
PAINLEVEL_OUTOF10: 1
PAINLEVEL_OUTOF10: 3

## 2024-08-28 ASSESSMENT — PAIN DESCRIPTION - LOCATION
LOCATION: BACK;CHEST
LOCATION: BACK;FOOT
LOCATION: BACK

## 2024-08-28 ASSESSMENT — PAIN DESCRIPTION - DESCRIPTORS
DESCRIPTORS: ACHING
DESCRIPTORS: DISCOMFORT

## 2024-08-28 NOTE — DISCHARGE SUMMARY
[facility] Discharge Summary      Patient ID: Estefany Garcia    MRN: [id number]     Acct:  [id number]       Patient's PCP: Zulma Payne APRN - CNP    Admit Date: 8/14/2024     Discharge Date: 8/20/2024      Admitting Physician: Madonna Aviles MD    Discharge Physician: Niesha Sunshine MD     Discharge Diagnoses:    Primary Problem  Pulmonary congestion    Active Hospital Problems    Diagnosis Date Noted    Acute coronary syndrome (HCC) [I24.9] 08/19/2024     Priority: High    Chronic respiratory failure with hypoxia (HCC) [J96.11] 03/06/2023     Priority: Medium    Pulmonary congestion [R09.89] 08/15/2024    End-stage renal disease needing dialysis (HCC) [N18.6, Z99.2] 07/23/2024    ERICK (obstructive sleep apnea) [G47.33] 05/25/2023    Chronic ischemic heart disease [I25.9] 07/23/2021    Essential hypertension [I10] 05/03/2021    Chronic diastolic heart failure (HCC) [I50.32] 09/20/2018    Type 2 diabetes 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, with long-term current use of insulin (HCC) [E11.22, N18.6, Z99.2, Z79.4] 09/20/2018     Past Medical History:   Diagnosis Date    Arthritis     Backache, unspecified     CAD (coronary artery disease)     Cerebral artery occlusion with cerebral infarction (HCC)     CHF (congestive heart failure) (HCC)     Chronic kidney disease     Coronary atherosclerosis of artery bypass graft     COVID 01/31/2022    Cramp of limb     Epistaxis 03/05/2023    Gallstones     Hemodialysis patient (HCC)     MONDAY WEDNESDAY AND FRIDAYS  /  [facility] [place]    Hyperlipidemia     Hypertension     Insomnia     Neuromuscular disorder (HCC)     Pneumonia     Psychiatric problem     Thyroid disease     Type II or unspecified type diabetes mellitus with renal manifestations, not stated as uncontrolled(250.40)     Type II or unspecified type diabetes mellitus without mention of complication, not stated as uncontrolled     Unspecified vitamin D deficiency      The  scale coverage as needed 3 times a day: for blood sugar  no additional insulin.  Sugar 140-199 give 3 units.  Sugar 200-249 give 6 units.  Sugar 250-299 give 9 units.  Sugar 300-3 49 give 12 units.  Sugar 3  give 15 units.  Sugar over 400 give 18 units.    Maximum allowable amount 103 units daily     polyvinyl alcohol 1.4 % ophthalmic solution  Commonly known as: LIQUIFILM TEARS     potassium chloride 10 MEQ extended release capsule  Commonly known as: MICRO-K     prasugrel 10 MG Tabs  Commonly known as: EFFIENT  Take 1 tablet by mouth daily     Procrit 3000 UNIT/ML injection  Generic drug: epoetin raphael  Inject 1 mL into the skin Every Monday, Wednesday, and Friday     sevelamer 800 MG tablet  Commonly known as: Renvela  Take 2 tablets by mouth 3 times daily (with meals)     sodium bicarbonate 650 MG tablet  Take 2 tablets by mouth twice daily     venlafaxine 75 MG extended release capsule  Commonly known as: EFFEXOR XR  TAKE 1 CAPSULE BY MOUTH ONCE DAILY WITH BREAKFAST            STOP taking these medications      HYDROcodone-acetaminophen 5-325 MG per tablet  Commonly known as: NORCO     lidocaine-prilocaine 2.5-2.5 % cream  Commonly known as: EMLA               Where to Get Your Medications        These medications were sent to [address] -  [phone] - F [phone]  [address] [place], [place] [address]      Phone: [phone]   Dexcom G6 Sensor Misc       Information about where to get these medications is not yet available    Ask your nurse or doctor about these medications  lidocaine 4 % external patch          Activity: activity as tolerated    Diet: diabetic diet and renal diet    Time Spent on discharge is more than  40 minutes  in the examination, evaluation, counseling and review of medications and discharge plan.      Electronically signed by Niesha Sunshine MD on 8/27/2024 at 10:10 PM

## 2024-08-29 PROBLEM — E87.70 HYPERVOLEMIA: Status: ACTIVE | Noted: 2024-08-29

## 2024-08-29 LAB
ANION GAP SERPL CALCULATED.3IONS-SCNC: 12 MMOL/L (ref 9–17)
BASOPHILS # BLD: 0 K/UL (ref 0–0.2)
BASOPHILS NFR BLD: 1 % (ref 0–2)
BUN SERPL-MCNC: 27 MG/DL (ref 8–23)
CALCIUM SERPL-MCNC: 8.9 MG/DL (ref 8.6–10.4)
CHLORIDE SERPL-SCNC: 97 MMOL/L (ref 98–107)
CO2 SERPL-SCNC: 26 MMOL/L (ref 20–31)
CREAT SERPL-MCNC: 3.6 MG/DL (ref 0.5–0.9)
EKG Q-T INTERVAL: 450 MS
EKG QRS DURATION: 104 MS
EKG QTC CALCULATION (BAZETT): 495 MS
EKG R AXIS: 46 DEGREES
EKG T AXIS: -68 DEGREES
EKG VENTRICULAR RATE: 73 BPM
EOSINOPHIL # BLD: 0.2 K/UL (ref 0–0.4)
EOSINOPHILS RELATIVE PERCENT: 3 % (ref 0–4)
ERYTHROCYTE [DISTWIDTH] IN BLOOD BY AUTOMATED COUNT: 18.1 % (ref 11.5–14.9)
GFR, ESTIMATED: 13 ML/MIN/1.73M2
GLUCOSE BLD-MCNC: 114 MG/DL (ref 65–105)
GLUCOSE BLD-MCNC: 155 MG/DL (ref 65–105)
GLUCOSE BLD-MCNC: 167 MG/DL (ref 65–105)
GLUCOSE BLD-MCNC: 96 MG/DL (ref 65–105)
GLUCOSE SERPL-MCNC: 112 MG/DL (ref 70–99)
HCT VFR BLD AUTO: 30.8 % (ref 36–46)
HGB BLD-MCNC: 9.9 G/DL (ref 12–16)
LYMPHOCYTES NFR BLD: 1 K/UL (ref 1–4.8)
LYMPHOCYTES RELATIVE PERCENT: 18 % (ref 24–44)
MCH RBC QN AUTO: 37 PG (ref 26–34)
MCHC RBC AUTO-ENTMCNC: 32.3 G/DL (ref 31–37)
MCV RBC AUTO: 114.7 FL (ref 80–100)
MONOCYTES NFR BLD: 0.5 K/UL (ref 0.1–1.3)
MONOCYTES NFR BLD: 9 % (ref 1–7)
NEUTROPHILS NFR BLD: 69 % (ref 36–66)
NEUTS SEG NFR BLD: 3.7 K/UL (ref 1.3–9.1)
PLATELET # BLD AUTO: 188 K/UL (ref 150–450)
PMV BLD AUTO: 8 FL (ref 6–12)
POTASSIUM SERPL-SCNC: 3.8 MMOL/L (ref 3.7–5.3)
RBC # BLD AUTO: 2.69 M/UL (ref 4–5.2)
SODIUM SERPL-SCNC: 135 MMOL/L (ref 135–144)
WBC OTHER # BLD: 5.4 K/UL (ref 3.5–11)

## 2024-08-29 PROCEDURE — 2580000003 HC RX 258: Performed by: STUDENT IN AN ORGANIZED HEALTH CARE EDUCATION/TRAINING PROGRAM

## 2024-08-29 PROCEDURE — 82947 ASSAY GLUCOSE BLOOD QUANT: CPT

## 2024-08-29 PROCEDURE — G0378 HOSPITAL OBSERVATION PER HR: HCPCS

## 2024-08-29 PROCEDURE — 36415 COLL VENOUS BLD VENIPUNCTURE: CPT

## 2024-08-29 PROCEDURE — 99232 SBSQ HOSP IP/OBS MODERATE 35: CPT | Performed by: STUDENT IN AN ORGANIZED HEALTH CARE EDUCATION/TRAINING PROGRAM

## 2024-08-29 PROCEDURE — 6360000002 HC RX W HCPCS: Performed by: STUDENT IN AN ORGANIZED HEALTH CARE EDUCATION/TRAINING PROGRAM

## 2024-08-29 PROCEDURE — 97166 OT EVAL MOD COMPLEX 45 MIN: CPT

## 2024-08-29 PROCEDURE — 96372 THER/PROPH/DIAG INJ SC/IM: CPT

## 2024-08-29 PROCEDURE — 97530 THERAPEUTIC ACTIVITIES: CPT

## 2024-08-29 PROCEDURE — 97162 PT EVAL MOD COMPLEX 30 MIN: CPT

## 2024-08-29 PROCEDURE — 85025 COMPLETE CBC W/AUTO DIFF WBC: CPT

## 2024-08-29 PROCEDURE — 80048 BASIC METABOLIC PNL TOTAL CA: CPT

## 2024-08-29 PROCEDURE — 6370000000 HC RX 637 (ALT 250 FOR IP): Performed by: STUDENT IN AN ORGANIZED HEALTH CARE EDUCATION/TRAINING PROGRAM

## 2024-08-29 RX ORDER — HYDROCODONE BITARTRATE AND ACETAMINOPHEN 5; 325 MG/1; MG/1
1 TABLET ORAL NIGHTLY PRN
Status: DISCONTINUED | OUTPATIENT
Start: 2024-08-29 | End: 2024-09-11 | Stop reason: HOSPADM

## 2024-08-29 RX ADMIN — CYCLOBENZAPRINE 5 MG: 10 TABLET, FILM COATED ORAL at 17:18

## 2024-08-29 RX ADMIN — ASPIRIN 81 MG 81 MG: 81 TABLET ORAL at 08:07

## 2024-08-29 RX ADMIN — FERROUS SULFATE TAB 325 MG (65 MG ELEMENTAL FE) 325 MG: 325 (65 FE) TAB at 08:07

## 2024-08-29 RX ADMIN — BUSPIRONE HYDROCHLORIDE 10 MG: 10 TABLET ORAL at 21:13

## 2024-08-29 RX ADMIN — INSULIN LISPRO 15 UNITS: 100 INJECTION, SOLUTION INTRAVENOUS; SUBCUTANEOUS at 08:09

## 2024-08-29 RX ADMIN — CARVEDILOL 3.12 MG: 3.12 TABLET, FILM COATED ORAL at 21:15

## 2024-08-29 RX ADMIN — INSULIN GLARGINE 72 UNITS: 100 INJECTION, SOLUTION SUBCUTANEOUS at 08:08

## 2024-08-29 RX ADMIN — Medication 3 MG: at 21:15

## 2024-08-29 RX ADMIN — CYCLOBENZAPRINE 5 MG: 10 TABLET, FILM COATED ORAL at 08:09

## 2024-08-29 RX ADMIN — HEPARIN SODIUM 5000 UNITS: 5000 INJECTION INTRAVENOUS; SUBCUTANEOUS at 05:38

## 2024-08-29 RX ADMIN — DOCUSATE SODIUM 100 MG: 100 CAPSULE, LIQUID FILLED ORAL at 21:14

## 2024-08-29 RX ADMIN — DOCUSATE SODIUM 100 MG: 100 CAPSULE, LIQUID FILLED ORAL at 08:07

## 2024-08-29 RX ADMIN — BUSPIRONE HYDROCHLORIDE 10 MG: 10 TABLET ORAL at 08:08

## 2024-08-29 RX ADMIN — SODIUM CHLORIDE, PRESERVATIVE FREE 10 ML: 5 INJECTION INTRAVENOUS at 08:10

## 2024-08-29 RX ADMIN — HYDROCODONE BITARTRATE AND ACETAMINOPHEN 1 TABLET: 5; 325 TABLET ORAL at 22:19

## 2024-08-29 RX ADMIN — BUSPIRONE HYDROCHLORIDE 10 MG: 10 TABLET ORAL at 14:09

## 2024-08-29 RX ADMIN — INSULIN LISPRO 15 UNITS: 100 INJECTION, SOLUTION INTRAVENOUS; SUBCUTANEOUS at 12:08

## 2024-08-29 RX ADMIN — ALLOPURINOL 100 MG: 100 TABLET ORAL at 08:08

## 2024-08-29 RX ADMIN — SEVELAMER CARBONATE 1600 MG: 800 TABLET, FILM COATED ORAL at 12:08

## 2024-08-29 RX ADMIN — SEVELAMER CARBONATE 1600 MG: 800 TABLET, FILM COATED ORAL at 08:07

## 2024-08-29 RX ADMIN — SODIUM BICARBONATE 1300 MG: 650 TABLET ORAL at 08:08

## 2024-08-29 RX ADMIN — SEVELAMER CARBONATE 1600 MG: 800 TABLET, FILM COATED ORAL at 17:16

## 2024-08-29 RX ADMIN — PRASUGREL 10 MG: 10 TABLET, FILM COATED ORAL at 09:12

## 2024-08-29 RX ADMIN — VENLAFAXINE HYDROCHLORIDE 75 MG: 75 CAPSULE, EXTENDED RELEASE ORAL at 08:07

## 2024-08-29 RX ADMIN — BUMETANIDE 3 MG: 1 TABLET ORAL at 08:07

## 2024-08-29 RX ADMIN — INSULIN GLARGINE 42 UNITS: 100 INJECTION, SOLUTION SUBCUTANEOUS at 21:13

## 2024-08-29 RX ADMIN — SODIUM BICARBONATE 1300 MG: 650 TABLET ORAL at 21:13

## 2024-08-29 RX ADMIN — ACETAMINOPHEN 650 MG: 325 TABLET ORAL at 17:18

## 2024-08-29 RX ADMIN — CYCLOBENZAPRINE 5 MG: 10 TABLET, FILM COATED ORAL at 21:14

## 2024-08-29 RX ADMIN — SODIUM CHLORIDE, PRESERVATIVE FREE 10 ML: 5 INJECTION INTRAVENOUS at 21:15

## 2024-08-29 RX ADMIN — ATORVASTATIN CALCIUM 80 MG: 80 TABLET, FILM COATED ORAL at 21:13

## 2024-08-29 RX ADMIN — CARVEDILOL 3.12 MG: 3.12 TABLET, FILM COATED ORAL at 08:08

## 2024-08-29 ASSESSMENT — PAIN - FUNCTIONAL ASSESSMENT
PAIN_FUNCTIONAL_ASSESSMENT: PREVENTS OR INTERFERES SOME ACTIVE ACTIVITIES AND ADLS
PAIN_FUNCTIONAL_ASSESSMENT: ACTIVITIES ARE NOT PREVENTED
PAIN_FUNCTIONAL_ASSESSMENT: PREVENTS OR INTERFERES SOME ACTIVE ACTIVITIES AND ADLS

## 2024-08-29 ASSESSMENT — PAIN DESCRIPTION - LOCATION
LOCATION: BACK

## 2024-08-29 ASSESSMENT — PAIN DESCRIPTION - ORIENTATION
ORIENTATION: MID
ORIENTATION: MID;LOWER

## 2024-08-29 ASSESSMENT — PAIN SCALES - GENERAL
PAINLEVEL_OUTOF10: 10
PAINLEVEL_OUTOF10: 7

## 2024-08-29 ASSESSMENT — PAIN DESCRIPTION - DESCRIPTORS
DESCRIPTORS: STABBING
DESCRIPTORS: SHARP;STABBING
DESCRIPTORS: STABBING

## 2024-08-30 ENCOUNTER — APPOINTMENT (OUTPATIENT)
Dept: VASCULAR LAB | Age: 64
End: 2024-08-30
Attending: STUDENT IN AN ORGANIZED HEALTH CARE EDUCATION/TRAINING PROGRAM
Payer: COMMERCIAL

## 2024-08-30 LAB
ANION GAP SERPL CALCULATED.3IONS-SCNC: 14 MMOL/L (ref 9–17)
BASOPHILS # BLD: 0.05 K/UL (ref 0–0.2)
BASOPHILS NFR BLD: 1 % (ref 0–2)
BUN SERPL-MCNC: 35 MG/DL (ref 8–23)
CALCIUM SERPL-MCNC: 9 MG/DL (ref 8.6–10.4)
CHLORIDE SERPL-SCNC: 97 MMOL/L (ref 98–107)
CO2 SERPL-SCNC: 25 MMOL/L (ref 20–31)
CREAT SERPL-MCNC: 4 MG/DL (ref 0.5–0.9)
ECHO BSA: 2.23 M2
EOSINOPHIL # BLD: 0.16 K/UL (ref 0–0.4)
EOSINOPHILS RELATIVE PERCENT: 3 % (ref 0–4)
ERYTHROCYTE [DISTWIDTH] IN BLOOD BY AUTOMATED COUNT: 18 % (ref 11.5–14.9)
GFR, ESTIMATED: 12 ML/MIN/1.73M2
GLUCOSE BLD-MCNC: 181 MG/DL (ref 65–105)
GLUCOSE SERPL-MCNC: 175 MG/DL (ref 70–99)
HCT VFR BLD AUTO: 31.5 % (ref 36–46)
HGB BLD-MCNC: 10.1 G/DL (ref 12–16)
LYMPHOCYTES NFR BLD: 0.88 K/UL (ref 1–4.8)
LYMPHOCYTES RELATIVE PERCENT: 17 % (ref 24–44)
MAGNESIUM SERPL-MCNC: 2.5 MG/DL (ref 1.6–2.6)
MCH RBC QN AUTO: 36.7 PG (ref 26–34)
MCHC RBC AUTO-ENTMCNC: 32.1 G/DL (ref 31–37)
MCV RBC AUTO: 114.3 FL (ref 80–100)
MONOCYTES NFR BLD: 0.47 K/UL (ref 0.1–1.3)
MONOCYTES NFR BLD: 9 % (ref 1–7)
MORPHOLOGY: ABNORMAL
NEUTROPHILS NFR BLD: 70 % (ref 36–66)
NEUTS SEG NFR BLD: 3.64 K/UL (ref 1.3–9.1)
PHOSPHATE SERPL-MCNC: 3.7 MG/DL (ref 2.6–4.5)
PLATELET # BLD AUTO: 198 K/UL (ref 150–450)
PMV BLD AUTO: 7.8 FL (ref 6–12)
POTASSIUM SERPL-SCNC: 3.5 MMOL/L (ref 3.7–5.3)
RBC # BLD AUTO: 2.76 M/UL (ref 4–5.2)
SODIUM SERPL-SCNC: 136 MMOL/L (ref 135–144)
WBC OTHER # BLD: 5.2 K/UL (ref 3.5–11)

## 2024-08-30 PROCEDURE — 83735 ASSAY OF MAGNESIUM: CPT

## 2024-08-30 PROCEDURE — 82947 ASSAY GLUCOSE BLOOD QUANT: CPT

## 2024-08-30 PROCEDURE — 99233 SBSQ HOSP IP/OBS HIGH 50: CPT | Performed by: INTERNAL MEDICINE

## 2024-08-30 PROCEDURE — 96372 THER/PROPH/DIAG INJ SC/IM: CPT

## 2024-08-30 PROCEDURE — G0378 HOSPITAL OBSERVATION PER HR: HCPCS

## 2024-08-30 PROCEDURE — 84100 ASSAY OF PHOSPHORUS: CPT

## 2024-08-30 PROCEDURE — 93971 EXTREMITY STUDY: CPT | Performed by: SURGERY

## 2024-08-30 PROCEDURE — 97530 THERAPEUTIC ACTIVITIES: CPT

## 2024-08-30 PROCEDURE — 80048 BASIC METABOLIC PNL TOTAL CA: CPT

## 2024-08-30 PROCEDURE — 2580000003 HC RX 258: Performed by: STUDENT IN AN ORGANIZED HEALTH CARE EDUCATION/TRAINING PROGRAM

## 2024-08-30 PROCEDURE — 85025 COMPLETE CBC W/AUTO DIFF WBC: CPT

## 2024-08-30 PROCEDURE — 6360000002 HC RX W HCPCS: Performed by: STUDENT IN AN ORGANIZED HEALTH CARE EDUCATION/TRAINING PROGRAM

## 2024-08-30 PROCEDURE — 99232 SBSQ HOSP IP/OBS MODERATE 35: CPT | Performed by: STUDENT IN AN ORGANIZED HEALTH CARE EDUCATION/TRAINING PROGRAM

## 2024-08-30 PROCEDURE — 36415 COLL VENOUS BLD VENIPUNCTURE: CPT

## 2024-08-30 PROCEDURE — 97116 GAIT TRAINING THERAPY: CPT

## 2024-08-30 PROCEDURE — 93971 EXTREMITY STUDY: CPT

## 2024-08-30 PROCEDURE — 6370000000 HC RX 637 (ALT 250 FOR IP): Performed by: INTERNAL MEDICINE

## 2024-08-30 PROCEDURE — 6370000000 HC RX 637 (ALT 250 FOR IP): Performed by: STUDENT IN AN ORGANIZED HEALTH CARE EDUCATION/TRAINING PROGRAM

## 2024-08-30 PROCEDURE — 90935 HEMODIALYSIS ONE EVALUATION: CPT

## 2024-08-30 RX ORDER — DIPHENHYDRAMINE HYDROCHLORIDE 50 MG/ML
25 INJECTION INTRAMUSCULAR; INTRAVENOUS ONCE AS NEEDED
Status: DISCONTINUED | OUTPATIENT
Start: 2024-08-30 | End: 2024-09-11 | Stop reason: HOSPADM

## 2024-08-30 RX ADMIN — BUSPIRONE HYDROCHLORIDE 10 MG: 10 TABLET ORAL at 14:47

## 2024-08-30 RX ADMIN — ATORVASTATIN CALCIUM 80 MG: 80 TABLET, FILM COATED ORAL at 19:50

## 2024-08-30 RX ADMIN — SEVELAMER CARBONATE 1600 MG: 800 TABLET, FILM COATED ORAL at 07:54

## 2024-08-30 RX ADMIN — ASPIRIN 81 MG 81 MG: 81 TABLET ORAL at 07:55

## 2024-08-30 RX ADMIN — BUSPIRONE HYDROCHLORIDE 10 MG: 10 TABLET ORAL at 07:54

## 2024-08-30 RX ADMIN — SODIUM CHLORIDE, PRESERVATIVE FREE 10 ML: 5 INJECTION INTRAVENOUS at 19:51

## 2024-08-30 RX ADMIN — SEVELAMER CARBONATE 1600 MG: 800 TABLET, FILM COATED ORAL at 16:56

## 2024-08-30 RX ADMIN — VENLAFAXINE HYDROCHLORIDE 75 MG: 75 CAPSULE, EXTENDED RELEASE ORAL at 07:54

## 2024-08-30 RX ADMIN — CYCLOBENZAPRINE 5 MG: 10 TABLET, FILM COATED ORAL at 04:46

## 2024-08-30 RX ADMIN — CARVEDILOL 3.12 MG: 3.12 TABLET, FILM COATED ORAL at 07:54

## 2024-08-30 RX ADMIN — SODIUM BICARBONATE 1300 MG: 650 TABLET ORAL at 07:54

## 2024-08-30 RX ADMIN — BUSPIRONE HYDROCHLORIDE 10 MG: 10 TABLET ORAL at 19:50

## 2024-08-30 RX ADMIN — INSULIN LISPRO 15 UNITS: 100 INJECTION, SOLUTION INTRAVENOUS; SUBCUTANEOUS at 07:55

## 2024-08-30 RX ADMIN — ALLOPURINOL 100 MG: 100 TABLET ORAL at 07:54

## 2024-08-30 RX ADMIN — FERROUS SULFATE TAB 325 MG (65 MG ELEMENTAL FE) 325 MG: 325 (65 FE) TAB at 07:54

## 2024-08-30 RX ADMIN — HEPARIN SODIUM 5000 UNITS: 5000 INJECTION INTRAVENOUS; SUBCUTANEOUS at 14:41

## 2024-08-30 RX ADMIN — SODIUM CHLORIDE, PRESERVATIVE FREE 10 ML: 5 INJECTION INTRAVENOUS at 07:56

## 2024-08-30 RX ADMIN — DOCUSATE SODIUM 100 MG: 100 CAPSULE, LIQUID FILLED ORAL at 07:55

## 2024-08-30 RX ADMIN — PRASUGREL 10 MG: 10 TABLET, FILM COATED ORAL at 07:55

## 2024-08-30 RX ADMIN — INSULIN GLARGINE 42 UNITS: 100 INJECTION, SOLUTION SUBCUTANEOUS at 21:21

## 2024-08-30 RX ADMIN — CARVEDILOL 3.12 MG: 3.12 TABLET, FILM COATED ORAL at 19:50

## 2024-08-30 RX ADMIN — DOCUSATE SODIUM 100 MG: 100 CAPSULE, LIQUID FILLED ORAL at 19:50

## 2024-08-30 RX ADMIN — Medication 3 MG: at 21:19

## 2024-08-30 RX ADMIN — SODIUM BICARBONATE 1300 MG: 650 TABLET ORAL at 19:50

## 2024-08-30 RX ADMIN — INSULIN GLARGINE 72 UNITS: 100 INJECTION, SOLUTION SUBCUTANEOUS at 07:55

## 2024-08-30 ASSESSMENT — PAIN DESCRIPTION - PAIN TYPE: TYPE: CHRONIC PAIN

## 2024-08-30 ASSESSMENT — PAIN DESCRIPTION - DESCRIPTORS: DESCRIPTORS: SPASM

## 2024-08-30 ASSESSMENT — PAIN DESCRIPTION - FREQUENCY: FREQUENCY: INTERMITTENT

## 2024-08-30 ASSESSMENT — PAIN SCALES - GENERAL
PAINLEVEL_OUTOF10: 7
PAINLEVEL_OUTOF10: 0
PAINLEVEL_OUTOF10: 5

## 2024-08-30 ASSESSMENT — PAIN DESCRIPTION - LOCATION: LOCATION: BACK

## 2024-08-30 ASSESSMENT — PAIN - FUNCTIONAL ASSESSMENT: PAIN_FUNCTIONAL_ASSESSMENT: ACTIVITIES ARE NOT PREVENTED

## 2024-08-30 ASSESSMENT — PAIN DESCRIPTION - ORIENTATION: ORIENTATION: MID

## 2024-08-31 LAB
ANION GAP SERPL CALCULATED.3IONS-SCNC: 13 MMOL/L (ref 9–17)
BASOPHILS # BLD: 0.06 K/UL (ref 0–0.2)
BASOPHILS NFR BLD: 1 % (ref 0–2)
BUN SERPL-MCNC: 21 MG/DL (ref 8–23)
CALCIUM SERPL-MCNC: 9.2 MG/DL (ref 8.6–10.4)
CHLORIDE SERPL-SCNC: 95 MMOL/L (ref 98–107)
CO2 SERPL-SCNC: 28 MMOL/L (ref 20–31)
CREAT SERPL-MCNC: 3.2 MG/DL (ref 0.5–0.9)
EOSINOPHIL # BLD: 0.19 K/UL (ref 0–0.4)
EOSINOPHILS RELATIVE PERCENT: 3 % (ref 0–4)
ERYTHROCYTE [DISTWIDTH] IN BLOOD BY AUTOMATED COUNT: 17.7 % (ref 11.5–14.9)
GFR, ESTIMATED: 15 ML/MIN/1.73M2
GLUCOSE BLD-MCNC: 106 MG/DL (ref 65–105)
GLUCOSE BLD-MCNC: 110 MG/DL (ref 65–105)
GLUCOSE BLD-MCNC: 139 MG/DL (ref 65–105)
GLUCOSE BLD-MCNC: 221 MG/DL (ref 65–105)
GLUCOSE BLD-MCNC: 222 MG/DL (ref 65–105)
GLUCOSE BLD-MCNC: 67 MG/DL (ref 65–105)
GLUCOSE BLD-MCNC: 72 MG/DL (ref 65–105)
GLUCOSE BLD-MCNC: 86 MG/DL (ref 65–105)
GLUCOSE SERPL-MCNC: 108 MG/DL (ref 70–99)
HCT VFR BLD AUTO: 31.7 % (ref 36–46)
HGB BLD-MCNC: 10.2 G/DL (ref 12–16)
LYMPHOCYTES NFR BLD: 0.96 K/UL (ref 1–4.8)
LYMPHOCYTES RELATIVE PERCENT: 15 % (ref 24–44)
MCH RBC QN AUTO: 36.3 PG (ref 26–34)
MCHC RBC AUTO-ENTMCNC: 32.1 G/DL (ref 31–37)
MCV RBC AUTO: 113.2 FL (ref 80–100)
MONOCYTES NFR BLD: 0.7 K/UL (ref 0.1–1.3)
MONOCYTES NFR BLD: 11 % (ref 1–7)
MORPHOLOGY: ABNORMAL
NEUTROPHILS NFR BLD: 70 % (ref 36–66)
NEUTS SEG NFR BLD: 4.49 K/UL (ref 1.3–9.1)
PLATELET # BLD AUTO: 187 K/UL (ref 150–450)
PMV BLD AUTO: 7.6 FL (ref 6–12)
POTASSIUM SERPL-SCNC: 3.6 MMOL/L (ref 3.7–5.3)
RBC # BLD AUTO: 2.81 M/UL (ref 4–5.2)
SODIUM SERPL-SCNC: 136 MMOL/L (ref 135–144)
WBC OTHER # BLD: 6.4 K/UL (ref 3.5–11)

## 2024-08-31 PROCEDURE — 99233 SBSQ HOSP IP/OBS HIGH 50: CPT | Performed by: INTERNAL MEDICINE

## 2024-08-31 PROCEDURE — G0378 HOSPITAL OBSERVATION PER HR: HCPCS

## 2024-08-31 PROCEDURE — 80048 BASIC METABOLIC PNL TOTAL CA: CPT

## 2024-08-31 PROCEDURE — 36415 COLL VENOUS BLD VENIPUNCTURE: CPT

## 2024-08-31 PROCEDURE — 2580000003 HC RX 258: Performed by: STUDENT IN AN ORGANIZED HEALTH CARE EDUCATION/TRAINING PROGRAM

## 2024-08-31 PROCEDURE — 6370000000 HC RX 637 (ALT 250 FOR IP): Performed by: INTERNAL MEDICINE

## 2024-08-31 PROCEDURE — 6370000000 HC RX 637 (ALT 250 FOR IP): Performed by: FAMILY MEDICINE

## 2024-08-31 PROCEDURE — 85025 COMPLETE CBC W/AUTO DIFF WBC: CPT

## 2024-08-31 PROCEDURE — 6370000000 HC RX 637 (ALT 250 FOR IP): Performed by: STUDENT IN AN ORGANIZED HEALTH CARE EDUCATION/TRAINING PROGRAM

## 2024-08-31 PROCEDURE — 97140 MANUAL THERAPY 1/> REGIONS: CPT

## 2024-08-31 PROCEDURE — 99231 SBSQ HOSP IP/OBS SF/LOW 25: CPT | Performed by: FAMILY MEDICINE

## 2024-08-31 RX ADMIN — INSULIN LISPRO 15 UNITS: 100 INJECTION, SOLUTION INTRAVENOUS; SUBCUTANEOUS at 16:50

## 2024-08-31 RX ADMIN — BUSPIRONE HYDROCHLORIDE 10 MG: 10 TABLET ORAL at 15:04

## 2024-08-31 RX ADMIN — CARVEDILOL 3.12 MG: 3.12 TABLET, FILM COATED ORAL at 08:19

## 2024-08-31 RX ADMIN — ASPIRIN 81 MG 81 MG: 81 TABLET ORAL at 08:19

## 2024-08-31 RX ADMIN — SEVELAMER CARBONATE 1600 MG: 800 TABLET, FILM COATED ORAL at 08:19

## 2024-08-31 RX ADMIN — SODIUM BICARBONATE 1300 MG: 650 TABLET ORAL at 21:38

## 2024-08-31 RX ADMIN — SODIUM BICARBONATE 1300 MG: 650 TABLET ORAL at 08:19

## 2024-08-31 RX ADMIN — DOCUSATE SODIUM 100 MG: 100 CAPSULE, LIQUID FILLED ORAL at 08:20

## 2024-08-31 RX ADMIN — DOCUSATE SODIUM 100 MG: 100 CAPSULE, LIQUID FILLED ORAL at 21:37

## 2024-08-31 RX ADMIN — ATORVASTATIN CALCIUM 80 MG: 80 TABLET, FILM COATED ORAL at 21:37

## 2024-08-31 RX ADMIN — Medication 3 MG: at 21:37

## 2024-08-31 RX ADMIN — INSULIN LISPRO 12 UNITS: 100 INJECTION, SOLUTION INTRAVENOUS; SUBCUTANEOUS at 16:50

## 2024-08-31 RX ADMIN — INSULIN GLARGINE 42 UNITS: 100 INJECTION, SOLUTION SUBCUTANEOUS at 21:38

## 2024-08-31 RX ADMIN — SEVELAMER CARBONATE 1600 MG: 800 TABLET, FILM COATED ORAL at 16:51

## 2024-08-31 RX ADMIN — FERROUS SULFATE TAB 325 MG (65 MG ELEMENTAL FE) 325 MG: 325 (65 FE) TAB at 08:19

## 2024-08-31 RX ADMIN — BUSPIRONE HYDROCHLORIDE 10 MG: 10 TABLET ORAL at 08:19

## 2024-08-31 RX ADMIN — SODIUM CHLORIDE, PRESERVATIVE FREE 10 ML: 5 INJECTION INTRAVENOUS at 08:19

## 2024-08-31 RX ADMIN — VENLAFAXINE HYDROCHLORIDE 75 MG: 75 CAPSULE, EXTENDED RELEASE ORAL at 08:19

## 2024-08-31 RX ADMIN — HYDROCODONE BITARTRATE AND ACETAMINOPHEN 1 TABLET: 5; 325 TABLET ORAL at 21:37

## 2024-08-31 RX ADMIN — PRASUGREL 10 MG: 10 TABLET, FILM COATED ORAL at 08:18

## 2024-08-31 RX ADMIN — BUSPIRONE HYDROCHLORIDE 10 MG: 10 TABLET ORAL at 21:37

## 2024-08-31 RX ADMIN — CYCLOBENZAPRINE 5 MG: 10 TABLET, FILM COATED ORAL at 16:50

## 2024-08-31 RX ADMIN — ALLOPURINOL 100 MG: 100 TABLET ORAL at 08:19

## 2024-08-31 RX ADMIN — DICLOFENAC SODIUM 4 G: 10 GEL TOPICAL at 21:43

## 2024-08-31 RX ADMIN — CARVEDILOL 3.12 MG: 3.12 TABLET, FILM COATED ORAL at 21:37

## 2024-08-31 RX ADMIN — SEVELAMER CARBONATE 1600 MG: 800 TABLET, FILM COATED ORAL at 12:24

## 2024-08-31 ASSESSMENT — PAIN SCALES - WONG BAKER
WONGBAKER_NUMERICALRESPONSE: HURTS A LITTLE BIT
WONGBAKER_NUMERICALRESPONSE: HURTS A LITTLE BIT

## 2024-08-31 ASSESSMENT — PAIN DESCRIPTION - LOCATION: LOCATION: BACK

## 2024-08-31 ASSESSMENT — PAIN - FUNCTIONAL ASSESSMENT: PAIN_FUNCTIONAL_ASSESSMENT: ACTIVITIES ARE NOT PREVENTED

## 2024-08-31 ASSESSMENT — PAIN DESCRIPTION - DESCRIPTORS: DESCRIPTORS: ACHING

## 2024-08-31 ASSESSMENT — PAIN SCALES - GENERAL: PAINLEVEL_OUTOF10: 9

## 2024-09-01 LAB
ANION GAP SERPL CALCULATED.3IONS-SCNC: 11 MMOL/L (ref 9–17)
BASOPHILS # BLD: 0.06 K/UL (ref 0–0.2)
BASOPHILS NFR BLD: 1 % (ref 0–2)
BUN SERPL-MCNC: 29 MG/DL (ref 8–23)
CALCIUM SERPL-MCNC: 9.1 MG/DL (ref 8.6–10.4)
CHLORIDE SERPL-SCNC: 98 MMOL/L (ref 98–107)
CO2 SERPL-SCNC: 26 MMOL/L (ref 20–31)
CREAT SERPL-MCNC: 3.5 MG/DL (ref 0.5–0.9)
EOSINOPHIL # BLD: 0.18 K/UL (ref 0–0.4)
EOSINOPHILS RELATIVE PERCENT: 3 % (ref 0–4)
ERYTHROCYTE [DISTWIDTH] IN BLOOD BY AUTOMATED COUNT: 17.5 % (ref 11.5–14.9)
GFR, ESTIMATED: 14 ML/MIN/1.73M2
GLUCOSE SERPL-MCNC: 152 MG/DL (ref 70–99)
HCT VFR BLD AUTO: 32 % (ref 36–46)
HGB BLD-MCNC: 10.3 G/DL (ref 12–16)
LYMPHOCYTES NFR BLD: 0.96 K/UL (ref 1–4.8)
LYMPHOCYTES RELATIVE PERCENT: 16 % (ref 24–44)
MAGNESIUM SERPL-MCNC: 2.3 MG/DL (ref 1.6–2.6)
MCH RBC QN AUTO: 35.8 PG (ref 26–34)
MCHC RBC AUTO-ENTMCNC: 32.1 G/DL (ref 31–37)
MCV RBC AUTO: 111.4 FL (ref 80–100)
MONOCYTES NFR BLD: 0.48 K/UL (ref 0.1–1.3)
MONOCYTES NFR BLD: 8 % (ref 1–7)
MORPHOLOGY: ABNORMAL
NEUTROPHILS NFR BLD: 72 % (ref 36–66)
NEUTS SEG NFR BLD: 4.32 K/UL (ref 1.3–9.1)
PLATELET # BLD AUTO: 197 K/UL (ref 150–450)
PMV BLD AUTO: 7.7 FL (ref 6–12)
POTASSIUM SERPL-SCNC: 3.3 MMOL/L (ref 3.7–5.3)
RBC # BLD AUTO: 2.87 M/UL (ref 4–5.2)
SODIUM SERPL-SCNC: 135 MMOL/L (ref 135–144)
WBC OTHER # BLD: 6 K/UL (ref 3.5–11)

## 2024-09-01 PROCEDURE — 6370000000 HC RX 637 (ALT 250 FOR IP): Performed by: INTERNAL MEDICINE

## 2024-09-01 PROCEDURE — 83735 ASSAY OF MAGNESIUM: CPT

## 2024-09-01 PROCEDURE — 80048 BASIC METABOLIC PNL TOTAL CA: CPT

## 2024-09-01 PROCEDURE — G0378 HOSPITAL OBSERVATION PER HR: HCPCS

## 2024-09-01 PROCEDURE — 6370000000 HC RX 637 (ALT 250 FOR IP): Performed by: STUDENT IN AN ORGANIZED HEALTH CARE EDUCATION/TRAINING PROGRAM

## 2024-09-01 PROCEDURE — 85025 COMPLETE CBC W/AUTO DIFF WBC: CPT

## 2024-09-01 PROCEDURE — 99232 SBSQ HOSP IP/OBS MODERATE 35: CPT | Performed by: FAMILY MEDICINE

## 2024-09-01 PROCEDURE — 36415 COLL VENOUS BLD VENIPUNCTURE: CPT

## 2024-09-01 RX ORDER — POTASSIUM CHLORIDE 1500 MG/1
20 TABLET, EXTENDED RELEASE ORAL ONCE
Status: COMPLETED | OUTPATIENT
Start: 2024-09-01 | End: 2024-09-01

## 2024-09-01 RX ADMIN — BUSPIRONE HYDROCHLORIDE 10 MG: 10 TABLET ORAL at 09:37

## 2024-09-01 RX ADMIN — ALLOPURINOL 100 MG: 100 TABLET ORAL at 09:37

## 2024-09-01 RX ADMIN — SEVELAMER CARBONATE 1600 MG: 800 TABLET, FILM COATED ORAL at 12:16

## 2024-09-01 RX ADMIN — INSULIN LISPRO 15 UNITS: 100 INJECTION, SOLUTION INTRAVENOUS; SUBCUTANEOUS at 16:47

## 2024-09-01 RX ADMIN — SEVELAMER CARBONATE 1600 MG: 800 TABLET, FILM COATED ORAL at 09:37

## 2024-09-01 RX ADMIN — CARVEDILOL 3.12 MG: 3.12 TABLET, FILM COATED ORAL at 21:12

## 2024-09-01 RX ADMIN — SEVELAMER CARBONATE 1600 MG: 800 TABLET, FILM COATED ORAL at 16:47

## 2024-09-01 RX ADMIN — ATORVASTATIN CALCIUM 80 MG: 80 TABLET, FILM COATED ORAL at 21:12

## 2024-09-01 RX ADMIN — SODIUM BICARBONATE 1300 MG: 650 TABLET ORAL at 21:12

## 2024-09-01 RX ADMIN — BUSPIRONE HYDROCHLORIDE 10 MG: 10 TABLET ORAL at 13:52

## 2024-09-01 RX ADMIN — SODIUM BICARBONATE 1300 MG: 650 TABLET ORAL at 09:37

## 2024-09-01 RX ADMIN — PRASUGREL 10 MG: 10 TABLET, FILM COATED ORAL at 09:44

## 2024-09-01 RX ADMIN — CARVEDILOL 3.12 MG: 3.12 TABLET, FILM COATED ORAL at 09:37

## 2024-09-01 RX ADMIN — DICLOFENAC SODIUM 4 G: 10 GEL TOPICAL at 09:52

## 2024-09-01 RX ADMIN — INSULIN GLARGINE 42 UNITS: 100 INJECTION, SOLUTION SUBCUTANEOUS at 21:12

## 2024-09-01 RX ADMIN — VENLAFAXINE HYDROCHLORIDE 75 MG: 75 CAPSULE, EXTENDED RELEASE ORAL at 09:37

## 2024-09-01 RX ADMIN — INSULIN GLARGINE 72 UNITS: 100 INJECTION, SOLUTION SUBCUTANEOUS at 09:42

## 2024-09-01 RX ADMIN — HYDROCODONE BITARTRATE AND ACETAMINOPHEN 1 TABLET: 5; 325 TABLET ORAL at 21:57

## 2024-09-01 RX ADMIN — BUSPIRONE HYDROCHLORIDE 10 MG: 10 TABLET ORAL at 21:12

## 2024-09-01 RX ADMIN — CYCLOBENZAPRINE 5 MG: 10 TABLET, FILM COATED ORAL at 19:24

## 2024-09-01 RX ADMIN — INSULIN LISPRO 4 UNITS: 100 INJECTION, SOLUTION INTRAVENOUS; SUBCUTANEOUS at 16:48

## 2024-09-01 RX ADMIN — Medication 3 MG: at 21:12

## 2024-09-01 RX ADMIN — POTASSIUM CHLORIDE 20 MEQ: 1500 TABLET, EXTENDED RELEASE ORAL at 16:47

## 2024-09-01 RX ADMIN — DICLOFENAC SODIUM 4 G: 10 GEL TOPICAL at 19:01

## 2024-09-01 RX ADMIN — ASPIRIN 81 MG 81 MG: 81 TABLET ORAL at 09:37

## 2024-09-01 RX ADMIN — DOCUSATE SODIUM 100 MG: 100 CAPSULE, LIQUID FILLED ORAL at 21:12

## 2024-09-01 RX ADMIN — FERROUS SULFATE TAB 325 MG (65 MG ELEMENTAL FE) 325 MG: 325 (65 FE) TAB at 09:37

## 2024-09-01 RX ADMIN — CYCLOBENZAPRINE 5 MG: 10 TABLET, FILM COATED ORAL at 01:50

## 2024-09-01 RX ADMIN — DOCUSATE SODIUM 100 MG: 100 CAPSULE, LIQUID FILLED ORAL at 09:37

## 2024-09-01 ASSESSMENT — PAIN DESCRIPTION - ORIENTATION
ORIENTATION: MID;UPPER
ORIENTATION: MID

## 2024-09-01 ASSESSMENT — PAIN SCALES - WONG BAKER
WONGBAKER_NUMERICALRESPONSE: HURTS A LITTLE BIT
WONGBAKER_NUMERICALRESPONSE: NO HURT
WONGBAKER_NUMERICALRESPONSE: NO HURT

## 2024-09-01 ASSESSMENT — PAIN DESCRIPTION - LOCATION
LOCATION: BACK

## 2024-09-01 ASSESSMENT — PAIN SCALES - GENERAL
PAINLEVEL_OUTOF10: 7
PAINLEVEL_OUTOF10: 6
PAINLEVEL_OUTOF10: 8
PAINLEVEL_OUTOF10: 7
PAINLEVEL_OUTOF10: 8
PAINLEVEL_OUTOF10: 3

## 2024-09-01 ASSESSMENT — PAIN DESCRIPTION - DESCRIPTORS
DESCRIPTORS: THROBBING
DESCRIPTORS: ACHING;SPASM
DESCRIPTORS: ACHING
DESCRIPTORS: ACHING;SPASM

## 2024-09-01 ASSESSMENT — PAIN - FUNCTIONAL ASSESSMENT: PAIN_FUNCTIONAL_ASSESSMENT: ACTIVITIES ARE NOT PREVENTED

## 2024-09-02 LAB
GLUCOSE BLD-MCNC: 164 MG/DL (ref 65–105)
GLUCOSE BLD-MCNC: 189 MG/DL (ref 65–105)
GLUCOSE BLD-MCNC: 190 MG/DL (ref 65–105)
GLUCOSE BLD-MCNC: 202 MG/DL (ref 65–105)
GLUCOSE BLD-MCNC: 277 MG/DL (ref 65–105)
GLUCOSE BLD-MCNC: 305 MG/DL (ref 65–105)

## 2024-09-02 PROCEDURE — G0378 HOSPITAL OBSERVATION PER HR: HCPCS

## 2024-09-02 PROCEDURE — 82947 ASSAY GLUCOSE BLOOD QUANT: CPT

## 2024-09-02 PROCEDURE — 6370000000 HC RX 637 (ALT 250 FOR IP): Performed by: STUDENT IN AN ORGANIZED HEALTH CARE EDUCATION/TRAINING PROGRAM

## 2024-09-02 PROCEDURE — 6370000000 HC RX 637 (ALT 250 FOR IP): Performed by: INTERNAL MEDICINE

## 2024-09-02 PROCEDURE — 99232 SBSQ HOSP IP/OBS MODERATE 35: CPT | Performed by: FAMILY MEDICINE

## 2024-09-02 PROCEDURE — 90935 HEMODIALYSIS ONE EVALUATION: CPT

## 2024-09-02 RX ADMIN — SEVELAMER CARBONATE 1600 MG: 800 TABLET, FILM COATED ORAL at 17:26

## 2024-09-02 RX ADMIN — BUSPIRONE HYDROCHLORIDE 10 MG: 10 TABLET ORAL at 21:03

## 2024-09-02 RX ADMIN — DICLOFENAC SODIUM 4 G: 10 GEL TOPICAL at 21:07

## 2024-09-02 RX ADMIN — ATORVASTATIN CALCIUM 80 MG: 80 TABLET, FILM COATED ORAL at 21:03

## 2024-09-02 RX ADMIN — INSULIN GLARGINE 42 UNITS: 100 INJECTION, SOLUTION SUBCUTANEOUS at 21:02

## 2024-09-02 RX ADMIN — SODIUM BICARBONATE 1300 MG: 650 TABLET ORAL at 21:03

## 2024-09-02 RX ADMIN — DOCUSATE SODIUM 100 MG: 100 CAPSULE, LIQUID FILLED ORAL at 21:03

## 2024-09-02 RX ADMIN — ALLOPURINOL 100 MG: 100 TABLET ORAL at 17:35

## 2024-09-02 RX ADMIN — FERROUS SULFATE TAB 325 MG (65 MG ELEMENTAL FE) 325 MG: 325 (65 FE) TAB at 17:35

## 2024-09-02 RX ADMIN — INSULIN LISPRO 16 UNITS: 100 INJECTION, SOLUTION INTRAVENOUS; SUBCUTANEOUS at 11:37

## 2024-09-02 RX ADMIN — INSULIN LISPRO 15 UNITS: 100 INJECTION, SOLUTION INTRAVENOUS; SUBCUTANEOUS at 08:59

## 2024-09-02 RX ADMIN — INSULIN GLARGINE 72 UNITS: 100 INJECTION, SOLUTION SUBCUTANEOUS at 08:59

## 2024-09-02 RX ADMIN — ASPIRIN 81 MG 81 MG: 81 TABLET ORAL at 17:26

## 2024-09-02 RX ADMIN — SEVELAMER CARBONATE 1600 MG: 800 TABLET, FILM COATED ORAL at 08:57

## 2024-09-02 RX ADMIN — HYDROCODONE BITARTRATE AND ACETAMINOPHEN 1 TABLET: 5; 325 TABLET ORAL at 21:03

## 2024-09-02 RX ADMIN — DICLOFENAC SODIUM 4 G: 10 GEL TOPICAL at 08:58

## 2024-09-02 RX ADMIN — ACETAMINOPHEN 650 MG: 325 TABLET ORAL at 08:57

## 2024-09-02 RX ADMIN — SODIUM BICARBONATE 1300 MG: 650 TABLET ORAL at 08:57

## 2024-09-02 RX ADMIN — INSULIN LISPRO 4 UNITS: 100 INJECTION, SOLUTION INTRAVENOUS; SUBCUTANEOUS at 09:00

## 2024-09-02 RX ADMIN — Medication 3 MG: at 21:03

## 2024-09-02 RX ADMIN — PRASUGREL 10 MG: 10 TABLET, FILM COATED ORAL at 17:35

## 2024-09-02 RX ADMIN — CYCLOBENZAPRINE 5 MG: 10 TABLET, FILM COATED ORAL at 21:03

## 2024-09-02 RX ADMIN — ACETAMINOPHEN 650 MG: 325 TABLET ORAL at 17:26

## 2024-09-02 RX ADMIN — BUSPIRONE HYDROCHLORIDE 10 MG: 10 TABLET ORAL at 08:57

## 2024-09-02 RX ADMIN — INSULIN LISPRO 15 UNITS: 100 INJECTION, SOLUTION INTRAVENOUS; SUBCUTANEOUS at 17:28

## 2024-09-02 RX ADMIN — VENLAFAXINE HYDROCHLORIDE 75 MG: 75 CAPSULE, EXTENDED RELEASE ORAL at 17:26

## 2024-09-02 RX ADMIN — CARVEDILOL 3.12 MG: 3.12 TABLET, FILM COATED ORAL at 21:04

## 2024-09-02 RX ADMIN — DICLOFENAC SODIUM 4 G: 10 GEL TOPICAL at 17:30

## 2024-09-02 RX ADMIN — INSULIN LISPRO 15 UNITS: 100 INJECTION, SOLUTION INTRAVENOUS; SUBCUTANEOUS at 11:37

## 2024-09-02 RX ADMIN — SEVELAMER CARBONATE 1600 MG: 800 TABLET, FILM COATED ORAL at 11:44

## 2024-09-02 ASSESSMENT — PAIN DESCRIPTION - ORIENTATION
ORIENTATION: LOWER;RIGHT;LEFT
ORIENTATION: RIGHT;LEFT;LOWER;UPPER;MID
ORIENTATION: MID
ORIENTATION: LOWER;RIGHT;LEFT;MID;UPPER

## 2024-09-02 ASSESSMENT — PAIN DESCRIPTION - LOCATION
LOCATION: BACK

## 2024-09-02 ASSESSMENT — PAIN SCALES - GENERAL
PAINLEVEL_OUTOF10: 6
PAINLEVEL_OUTOF10: 3
PAINLEVEL_OUTOF10: 8
PAINLEVEL_OUTOF10: 3
PAINLEVEL_OUTOF10: 5
PAINLEVEL_OUTOF10: 5

## 2024-09-02 ASSESSMENT — PAIN - FUNCTIONAL ASSESSMENT
PAIN_FUNCTIONAL_ASSESSMENT: PREVENTS OR INTERFERES SOME ACTIVE ACTIVITIES AND ADLS

## 2024-09-02 ASSESSMENT — PAIN DESCRIPTION - DESCRIPTORS
DESCRIPTORS: ACHING;DISCOMFORT
DESCRIPTORS: ACHING;THROBBING

## 2024-09-02 ASSESSMENT — PAIN SCALES - WONG BAKER
WONGBAKER_NUMERICALRESPONSE: NO HURT
WONGBAKER_NUMERICALRESPONSE: NO HURT

## 2024-09-03 LAB
GLUCOSE BLD-MCNC: 127 MG/DL (ref 65–105)
GLUCOSE BLD-MCNC: 149 MG/DL (ref 65–105)
GLUCOSE BLD-MCNC: 199 MG/DL (ref 65–105)
GLUCOSE BLD-MCNC: 256 MG/DL (ref 65–105)
GLUCOSE BLD-MCNC: 278 MG/DL (ref 65–105)
GLUCOSE BLD-MCNC: 373 MG/DL (ref 65–105)

## 2024-09-03 PROCEDURE — 82947 ASSAY GLUCOSE BLOOD QUANT: CPT

## 2024-09-03 PROCEDURE — 6370000000 HC RX 637 (ALT 250 FOR IP): Performed by: STUDENT IN AN ORGANIZED HEALTH CARE EDUCATION/TRAINING PROGRAM

## 2024-09-03 PROCEDURE — G0378 HOSPITAL OBSERVATION PER HR: HCPCS

## 2024-09-03 PROCEDURE — 97116 GAIT TRAINING THERAPY: CPT

## 2024-09-03 PROCEDURE — 6370000000 HC RX 637 (ALT 250 FOR IP): Performed by: INTERNAL MEDICINE

## 2024-09-03 PROCEDURE — 97530 THERAPEUTIC ACTIVITIES: CPT

## 2024-09-03 PROCEDURE — 97110 THERAPEUTIC EXERCISES: CPT

## 2024-09-03 RX ADMIN — SEVELAMER CARBONATE 1600 MG: 800 TABLET, FILM COATED ORAL at 11:43

## 2024-09-03 RX ADMIN — CARVEDILOL 3.12 MG: 3.12 TABLET, FILM COATED ORAL at 21:21

## 2024-09-03 RX ADMIN — BUSPIRONE HYDROCHLORIDE 10 MG: 10 TABLET ORAL at 08:43

## 2024-09-03 RX ADMIN — FERROUS SULFATE TAB 325 MG (65 MG ELEMENTAL FE) 325 MG: 325 (65 FE) TAB at 08:43

## 2024-09-03 RX ADMIN — ASPIRIN 81 MG 81 MG: 81 TABLET ORAL at 08:43

## 2024-09-03 RX ADMIN — BUSPIRONE HYDROCHLORIDE 10 MG: 10 TABLET ORAL at 21:21

## 2024-09-03 RX ADMIN — INSULIN LISPRO 8 UNITS: 100 INJECTION, SOLUTION INTRAVENOUS; SUBCUTANEOUS at 16:51

## 2024-09-03 RX ADMIN — INSULIN GLARGINE 72 UNITS: 100 INJECTION, SOLUTION SUBCUTANEOUS at 08:43

## 2024-09-03 RX ADMIN — ATORVASTATIN CALCIUM 80 MG: 80 TABLET, FILM COATED ORAL at 21:21

## 2024-09-03 RX ADMIN — INSULIN LISPRO 15 UNITS: 100 INJECTION, SOLUTION INTRAVENOUS; SUBCUTANEOUS at 16:51

## 2024-09-03 RX ADMIN — CYCLOBENZAPRINE 5 MG: 10 TABLET, FILM COATED ORAL at 17:37

## 2024-09-03 RX ADMIN — ACETAMINOPHEN 650 MG: 325 TABLET ORAL at 17:37

## 2024-09-03 RX ADMIN — ALLOPURINOL 100 MG: 100 TABLET ORAL at 08:43

## 2024-09-03 RX ADMIN — Medication 3 MG: at 21:24

## 2024-09-03 RX ADMIN — INSULIN LISPRO 8 UNITS: 100 INJECTION, SOLUTION INTRAVENOUS; SUBCUTANEOUS at 11:43

## 2024-09-03 RX ADMIN — VENLAFAXINE HYDROCHLORIDE 75 MG: 75 CAPSULE, EXTENDED RELEASE ORAL at 08:43

## 2024-09-03 RX ADMIN — BUSPIRONE HYDROCHLORIDE 10 MG: 10 TABLET ORAL at 13:48

## 2024-09-03 RX ADMIN — HYDROCODONE BITARTRATE AND ACETAMINOPHEN 1 TABLET: 5; 325 TABLET ORAL at 21:23

## 2024-09-03 RX ADMIN — SEVELAMER CARBONATE 1600 MG: 800 TABLET, FILM COATED ORAL at 16:52

## 2024-09-03 RX ADMIN — INSULIN LISPRO 15 UNITS: 100 INJECTION, SOLUTION INTRAVENOUS; SUBCUTANEOUS at 11:43

## 2024-09-03 RX ADMIN — PRASUGREL 10 MG: 10 TABLET, FILM COATED ORAL at 08:45

## 2024-09-03 RX ADMIN — SODIUM BICARBONATE 1300 MG: 650 TABLET ORAL at 08:43

## 2024-09-03 RX ADMIN — DICLOFENAC SODIUM 4 G: 10 GEL TOPICAL at 22:07

## 2024-09-03 RX ADMIN — CARVEDILOL 3.12 MG: 3.12 TABLET, FILM COATED ORAL at 08:43

## 2024-09-03 RX ADMIN — DICLOFENAC SODIUM 4 G: 10 GEL TOPICAL at 08:45

## 2024-09-03 RX ADMIN — INSULIN GLARGINE 42 UNITS: 100 INJECTION, SOLUTION SUBCUTANEOUS at 21:21

## 2024-09-03 RX ADMIN — SEVELAMER CARBONATE 1600 MG: 800 TABLET, FILM COATED ORAL at 08:43

## 2024-09-03 RX ADMIN — SODIUM BICARBONATE 1300 MG: 650 TABLET ORAL at 21:21

## 2024-09-03 RX ADMIN — CYCLOBENZAPRINE 5 MG: 10 TABLET, FILM COATED ORAL at 08:43

## 2024-09-03 RX ADMIN — ACETAMINOPHEN 650 MG: 325 TABLET ORAL at 08:43

## 2024-09-03 ASSESSMENT — PAIN DESCRIPTION - LOCATION
LOCATION: BACK

## 2024-09-03 ASSESSMENT — PAIN SCALES - GENERAL
PAINLEVEL_OUTOF10: 9
PAINLEVEL_OUTOF10: 8
PAINLEVEL_OUTOF10: 10

## 2024-09-03 ASSESSMENT — PAIN DESCRIPTION - DESCRIPTORS
DESCRIPTORS: ACHING
DESCRIPTORS: STABBING

## 2024-09-03 ASSESSMENT — PAIN DESCRIPTION - ORIENTATION
ORIENTATION: MID
ORIENTATION: MID

## 2024-09-04 LAB
ALBUMIN SERPL-MCNC: 3.5 G/DL (ref 3.5–5.2)
ALP SERPL-CCNC: 134 U/L (ref 35–104)
ALT SERPL-CCNC: 12 U/L (ref 5–33)
ANION GAP SERPL CALCULATED.3IONS-SCNC: 13 MMOL/L (ref 9–17)
AST SERPL-CCNC: 20 U/L
BILIRUB SERPL-MCNC: 0.4 MG/DL (ref 0.3–1.2)
BUN SERPL-MCNC: 44 MG/DL (ref 8–23)
CALCIUM SERPL-MCNC: 8.9 MG/DL (ref 8.6–10.4)
CHLORIDE SERPL-SCNC: 101 MMOL/L (ref 98–107)
CO2 SERPL-SCNC: 26 MMOL/L (ref 20–31)
CREAT SERPL-MCNC: 4.6 MG/DL (ref 0.5–0.9)
ERYTHROCYTE [DISTWIDTH] IN BLOOD BY AUTOMATED COUNT: 17.2 % (ref 11.5–14.9)
GFR, ESTIMATED: 10 ML/MIN/1.73M2
GLUCOSE BLD-MCNC: 103 MG/DL (ref 65–105)
GLUCOSE BLD-MCNC: 119 MG/DL (ref 65–105)
GLUCOSE BLD-MCNC: 277 MG/DL (ref 65–105)
GLUCOSE BLD-MCNC: 278 MG/DL (ref 65–105)
GLUCOSE SERPL-MCNC: 185 MG/DL (ref 70–99)
HCT VFR BLD AUTO: 29 % (ref 36–46)
HGB BLD-MCNC: 9.4 G/DL (ref 12–16)
MCH RBC QN AUTO: 35.2 PG (ref 26–34)
MCHC RBC AUTO-ENTMCNC: 32.4 G/DL (ref 31–37)
MCV RBC AUTO: 108.6 FL (ref 80–100)
PLATELET # BLD AUTO: 154 K/UL (ref 150–450)
PMV BLD AUTO: 7.5 FL (ref 6–12)
POTASSIUM SERPL-SCNC: 3.5 MMOL/L (ref 3.7–5.3)
PROT SERPL-MCNC: 6.7 G/DL (ref 6.4–8.3)
RBC # BLD AUTO: 2.68 M/UL (ref 4–5.2)
SODIUM SERPL-SCNC: 140 MMOL/L (ref 135–144)
WBC OTHER # BLD: 5.1 K/UL (ref 3.5–11)

## 2024-09-04 PROCEDURE — 6370000000 HC RX 637 (ALT 250 FOR IP): Performed by: STUDENT IN AN ORGANIZED HEALTH CARE EDUCATION/TRAINING PROGRAM

## 2024-09-04 PROCEDURE — 97535 SELF CARE MNGMENT TRAINING: CPT

## 2024-09-04 PROCEDURE — 97110 THERAPEUTIC EXERCISES: CPT

## 2024-09-04 PROCEDURE — G0378 HOSPITAL OBSERVATION PER HR: HCPCS

## 2024-09-04 PROCEDURE — 97530 THERAPEUTIC ACTIVITIES: CPT

## 2024-09-04 PROCEDURE — 99232 SBSQ HOSP IP/OBS MODERATE 35: CPT | Performed by: FAMILY MEDICINE

## 2024-09-04 PROCEDURE — 36415 COLL VENOUS BLD VENIPUNCTURE: CPT

## 2024-09-04 PROCEDURE — 90935 HEMODIALYSIS ONE EVALUATION: CPT

## 2024-09-04 PROCEDURE — 85027 COMPLETE CBC AUTOMATED: CPT

## 2024-09-04 PROCEDURE — 80053 COMPREHEN METABOLIC PANEL: CPT

## 2024-09-04 PROCEDURE — 6370000000 HC RX 637 (ALT 250 FOR IP): Performed by: INTERNAL MEDICINE

## 2024-09-04 PROCEDURE — 6370000000 HC RX 637 (ALT 250 FOR IP): Performed by: FAMILY MEDICINE

## 2024-09-04 RX ORDER — CALCIUM CARBONATE 500 MG/1
500 TABLET, CHEWABLE ORAL
Status: DISCONTINUED | OUTPATIENT
Start: 2024-09-04 | End: 2024-09-11 | Stop reason: HOSPADM

## 2024-09-04 RX ADMIN — CARVEDILOL 3.12 MG: 3.12 TABLET, FILM COATED ORAL at 20:33

## 2024-09-04 RX ADMIN — DICLOFENAC SODIUM 4 G: 10 GEL TOPICAL at 08:30

## 2024-09-04 RX ADMIN — PRASUGREL 10 MG: 10 TABLET, FILM COATED ORAL at 08:27

## 2024-09-04 RX ADMIN — ASPIRIN 81 MG 81 MG: 81 TABLET ORAL at 08:31

## 2024-09-04 RX ADMIN — FERROUS SULFATE TAB 325 MG (65 MG ELEMENTAL FE) 325 MG: 325 (65 FE) TAB at 08:27

## 2024-09-04 RX ADMIN — ACETAMINOPHEN 650 MG: 325 TABLET ORAL at 13:56

## 2024-09-04 RX ADMIN — INSULIN LISPRO 4 UNITS: 100 INJECTION, SOLUTION INTRAVENOUS; SUBCUTANEOUS at 16:35

## 2024-09-04 RX ADMIN — HYDROCODONE BITARTRATE AND ACETAMINOPHEN 1 TABLET: 5; 325 TABLET ORAL at 20:33

## 2024-09-04 RX ADMIN — INSULIN LISPRO 15 UNITS: 100 INJECTION, SOLUTION INTRAVENOUS; SUBCUTANEOUS at 08:29

## 2024-09-04 RX ADMIN — Medication 3 MG: at 20:45

## 2024-09-04 RX ADMIN — ALLOPURINOL 100 MG: 100 TABLET ORAL at 08:27

## 2024-09-04 RX ADMIN — ACETAMINOPHEN 650 MG: 325 TABLET ORAL at 08:27

## 2024-09-04 RX ADMIN — VENLAFAXINE HYDROCHLORIDE 75 MG: 75 CAPSULE, EXTENDED RELEASE ORAL at 08:27

## 2024-09-04 RX ADMIN — BUSPIRONE HYDROCHLORIDE 10 MG: 10 TABLET ORAL at 08:27

## 2024-09-04 RX ADMIN — SEVELAMER CARBONATE 1600 MG: 800 TABLET, FILM COATED ORAL at 08:30

## 2024-09-04 RX ADMIN — SODIUM BICARBONATE 1300 MG: 650 TABLET ORAL at 08:27

## 2024-09-04 RX ADMIN — INSULIN LISPRO 15 UNITS: 100 INJECTION, SOLUTION INTRAVENOUS; SUBCUTANEOUS at 16:35

## 2024-09-04 RX ADMIN — DOCUSATE SODIUM 100 MG: 100 CAPSULE, LIQUID FILLED ORAL at 20:44

## 2024-09-04 RX ADMIN — DICLOFENAC SODIUM 4 G: 10 GEL TOPICAL at 16:31

## 2024-09-04 RX ADMIN — BUSPIRONE HYDROCHLORIDE 10 MG: 10 TABLET ORAL at 13:56

## 2024-09-04 RX ADMIN — ATORVASTATIN CALCIUM 80 MG: 80 TABLET, FILM COATED ORAL at 20:33

## 2024-09-04 RX ADMIN — CYCLOBENZAPRINE 5 MG: 10 TABLET, FILM COATED ORAL at 08:27

## 2024-09-04 RX ADMIN — SEVELAMER CARBONATE 1600 MG: 800 TABLET, FILM COATED ORAL at 16:31

## 2024-09-04 RX ADMIN — INSULIN GLARGINE 72 UNITS: 100 INJECTION, SOLUTION SUBCUTANEOUS at 08:28

## 2024-09-04 RX ADMIN — ANTACID TABLETS 500 MG: 500 TABLET, CHEWABLE ORAL at 18:43

## 2024-09-04 RX ADMIN — CYCLOBENZAPRINE 5 MG: 10 TABLET, FILM COATED ORAL at 16:30

## 2024-09-04 RX ADMIN — SODIUM BICARBONATE 1300 MG: 650 TABLET ORAL at 20:33

## 2024-09-04 RX ADMIN — INSULIN GLARGINE 42 UNITS: 100 INJECTION, SOLUTION SUBCUTANEOUS at 20:33

## 2024-09-04 RX ADMIN — CARVEDILOL 3.12 MG: 3.12 TABLET, FILM COATED ORAL at 08:27

## 2024-09-04 RX ADMIN — BUSPIRONE HYDROCHLORIDE 10 MG: 10 TABLET ORAL at 20:33

## 2024-09-04 ASSESSMENT — PAIN DESCRIPTION - ORIENTATION
ORIENTATION: MID
ORIENTATION: MID

## 2024-09-04 ASSESSMENT — PAIN DESCRIPTION - DESCRIPTORS
DESCRIPTORS: ACHING
DESCRIPTORS: ACHING

## 2024-09-04 ASSESSMENT — PAIN SCALES - GENERAL
PAINLEVEL_OUTOF10: 10
PAINLEVEL_OUTOF10: 5
PAINLEVEL_OUTOF10: 9

## 2024-09-04 ASSESSMENT — PAIN DESCRIPTION - LOCATION
LOCATION: BACK
LOCATION: GENERALIZED;BACK

## 2024-09-05 LAB
GLUCOSE BLD-MCNC: 124 MG/DL (ref 65–105)
GLUCOSE BLD-MCNC: 201 MG/DL (ref 65–105)
GLUCOSE BLD-MCNC: 297 MG/DL (ref 65–105)
GLUCOSE BLD-MCNC: 320 MG/DL (ref 65–105)
GLUCOSE BLD-MCNC: 76 MG/DL (ref 65–105)

## 2024-09-05 PROCEDURE — 97116 GAIT TRAINING THERAPY: CPT

## 2024-09-05 PROCEDURE — 6370000000 HC RX 637 (ALT 250 FOR IP): Performed by: STUDENT IN AN ORGANIZED HEALTH CARE EDUCATION/TRAINING PROGRAM

## 2024-09-05 PROCEDURE — 97530 THERAPEUTIC ACTIVITIES: CPT

## 2024-09-05 PROCEDURE — 6370000000 HC RX 637 (ALT 250 FOR IP): Performed by: INTERNAL MEDICINE

## 2024-09-05 PROCEDURE — 6370000000 HC RX 637 (ALT 250 FOR IP): Performed by: FAMILY MEDICINE

## 2024-09-05 PROCEDURE — 97110 THERAPEUTIC EXERCISES: CPT

## 2024-09-05 PROCEDURE — 6360000002 HC RX W HCPCS: Performed by: STUDENT IN AN ORGANIZED HEALTH CARE EDUCATION/TRAINING PROGRAM

## 2024-09-05 PROCEDURE — G0378 HOSPITAL OBSERVATION PER HR: HCPCS

## 2024-09-05 PROCEDURE — 82947 ASSAY GLUCOSE BLOOD QUANT: CPT

## 2024-09-05 PROCEDURE — 99239 HOSP IP/OBS DSCHRG MGMT >30: CPT | Performed by: FAMILY MEDICINE

## 2024-09-05 RX ORDER — LANOLIN ALCOHOL/MO/W.PET/CERES
10 CREAM (GRAM) TOPICAL NIGHTLY PRN
Status: DISCONTINUED | OUTPATIENT
Start: 2024-09-05 | End: 2024-09-11 | Stop reason: HOSPADM

## 2024-09-05 RX ADMIN — DICLOFENAC SODIUM 4 G: 10 GEL TOPICAL at 22:24

## 2024-09-05 RX ADMIN — CARVEDILOL 3.12 MG: 3.12 TABLET, FILM COATED ORAL at 09:17

## 2024-09-05 RX ADMIN — INSULIN LISPRO 12 UNITS: 100 INJECTION, SOLUTION INTRAVENOUS; SUBCUTANEOUS at 17:25

## 2024-09-05 RX ADMIN — ALLOPURINOL 100 MG: 100 TABLET ORAL at 09:17

## 2024-09-05 RX ADMIN — BUSPIRONE HYDROCHLORIDE 10 MG: 10 TABLET ORAL at 09:16

## 2024-09-05 RX ADMIN — SODIUM BICARBONATE 1300 MG: 650 TABLET ORAL at 21:05

## 2024-09-05 RX ADMIN — PRASUGREL 10 MG: 10 TABLET, FILM COATED ORAL at 09:20

## 2024-09-05 RX ADMIN — SEVELAMER CARBONATE 1600 MG: 800 TABLET, FILM COATED ORAL at 17:23

## 2024-09-05 RX ADMIN — INSULIN LISPRO 15 UNITS: 100 INJECTION, SOLUTION INTRAVENOUS; SUBCUTANEOUS at 11:36

## 2024-09-05 RX ADMIN — DOCUSATE SODIUM 100 MG: 100 CAPSULE, LIQUID FILLED ORAL at 21:05

## 2024-09-05 RX ADMIN — SODIUM BICARBONATE 1300 MG: 650 TABLET ORAL at 09:16

## 2024-09-05 RX ADMIN — INSULIN GLARGINE 72 UNITS: 100 INJECTION, SOLUTION SUBCUTANEOUS at 09:17

## 2024-09-05 RX ADMIN — SEVELAMER CARBONATE 1600 MG: 800 TABLET, FILM COATED ORAL at 11:31

## 2024-09-05 RX ADMIN — ANTACID TABLETS 500 MG: 500 TABLET, CHEWABLE ORAL at 17:24

## 2024-09-05 RX ADMIN — BUSPIRONE HYDROCHLORIDE 10 MG: 10 TABLET ORAL at 14:37

## 2024-09-05 RX ADMIN — INSULIN LISPRO 12 UNITS: 100 INJECTION, SOLUTION INTRAVENOUS; SUBCUTANEOUS at 11:35

## 2024-09-05 RX ADMIN — INSULIN GLARGINE 42 UNITS: 100 INJECTION, SOLUTION SUBCUTANEOUS at 21:05

## 2024-09-05 RX ADMIN — INSULIN LISPRO 15 UNITS: 100 INJECTION, SOLUTION INTRAVENOUS; SUBCUTANEOUS at 09:18

## 2024-09-05 RX ADMIN — SEVELAMER CARBONATE 1600 MG: 800 TABLET, FILM COATED ORAL at 09:16

## 2024-09-05 RX ADMIN — ASPIRIN 81 MG 81 MG: 81 TABLET ORAL at 09:17

## 2024-09-05 RX ADMIN — BUSPIRONE HYDROCHLORIDE 10 MG: 10 TABLET ORAL at 21:05

## 2024-09-05 RX ADMIN — ATORVASTATIN CALCIUM 80 MG: 80 TABLET, FILM COATED ORAL at 21:05

## 2024-09-05 RX ADMIN — DOCUSATE SODIUM 100 MG: 100 CAPSULE, LIQUID FILLED ORAL at 09:16

## 2024-09-05 RX ADMIN — Medication 10.5 MG: at 21:16

## 2024-09-05 RX ADMIN — CARVEDILOL 3.12 MG: 3.12 TABLET, FILM COATED ORAL at 21:05

## 2024-09-05 RX ADMIN — VENLAFAXINE HYDROCHLORIDE 75 MG: 75 CAPSULE, EXTENDED RELEASE ORAL at 09:16

## 2024-09-05 RX ADMIN — FERROUS SULFATE TAB 325 MG (65 MG ELEMENTAL FE) 325 MG: 325 (65 FE) TAB at 09:16

## 2024-09-05 RX ADMIN — INSULIN LISPRO 15 UNITS: 100 INJECTION, SOLUTION INTRAVENOUS; SUBCUTANEOUS at 17:25

## 2024-09-05 ASSESSMENT — PAIN DESCRIPTION - LOCATION
LOCATION: BACK
LOCATION: BACK

## 2024-09-05 ASSESSMENT — PAIN SCALES - GENERAL
PAINLEVEL_OUTOF10: 10
PAINLEVEL_OUTOF10: 8

## 2024-09-06 LAB
ANION GAP SERPL CALCULATED.3IONS-SCNC: 13 MMOL/L (ref 9–17)
BUN SERPL-MCNC: 44 MG/DL (ref 8–23)
CALCIUM SERPL-MCNC: 9.3 MG/DL (ref 8.6–10.4)
CHLORIDE SERPL-SCNC: 100 MMOL/L (ref 98–107)
CO2 SERPL-SCNC: 26 MMOL/L (ref 20–31)
CREAT SERPL-MCNC: 4.4 MG/DL (ref 0.5–0.9)
GFR, ESTIMATED: 10 ML/MIN/1.73M2
GLUCOSE BLD-MCNC: 119 MG/DL (ref 65–105)
GLUCOSE BLD-MCNC: 280 MG/DL (ref 65–105)
GLUCOSE BLD-MCNC: 353 MG/DL (ref 65–105)
GLUCOSE BLD-MCNC: 97 MG/DL (ref 65–105)
GLUCOSE SERPL-MCNC: 222 MG/DL (ref 70–99)
POTASSIUM SERPL-SCNC: 3.8 MMOL/L (ref 3.7–5.3)
SODIUM SERPL-SCNC: 139 MMOL/L (ref 135–144)

## 2024-09-06 PROCEDURE — G0378 HOSPITAL OBSERVATION PER HR: HCPCS

## 2024-09-06 PROCEDURE — 6370000000 HC RX 637 (ALT 250 FOR IP): Performed by: FAMILY MEDICINE

## 2024-09-06 PROCEDURE — 82947 ASSAY GLUCOSE BLOOD QUANT: CPT

## 2024-09-06 PROCEDURE — 97116 GAIT TRAINING THERAPY: CPT

## 2024-09-06 PROCEDURE — 97110 THERAPEUTIC EXERCISES: CPT

## 2024-09-06 PROCEDURE — 6370000000 HC RX 637 (ALT 250 FOR IP): Performed by: STUDENT IN AN ORGANIZED HEALTH CARE EDUCATION/TRAINING PROGRAM

## 2024-09-06 PROCEDURE — 80048 BASIC METABOLIC PNL TOTAL CA: CPT

## 2024-09-06 PROCEDURE — 6370000000 HC RX 637 (ALT 250 FOR IP): Performed by: INTERNAL MEDICINE

## 2024-09-06 PROCEDURE — 90935 HEMODIALYSIS ONE EVALUATION: CPT

## 2024-09-06 RX ORDER — MIDODRINE HYDROCHLORIDE 10 MG/1
10 TABLET ORAL 2 TIMES DAILY PRN
Status: DISCONTINUED | OUTPATIENT
Start: 2024-09-06 | End: 2024-09-11 | Stop reason: HOSPADM

## 2024-09-06 RX ORDER — ALBUMIN (HUMAN) 12.5 G/50ML
25 SOLUTION INTRAVENOUS 2 TIMES DAILY PRN
Status: DISCONTINUED | OUTPATIENT
Start: 2024-09-06 | End: 2024-09-11 | Stop reason: HOSPADM

## 2024-09-06 RX ADMIN — Medication 10.5 MG: at 20:30

## 2024-09-06 RX ADMIN — BUSPIRONE HYDROCHLORIDE 10 MG: 10 TABLET ORAL at 20:30

## 2024-09-06 RX ADMIN — SEVELAMER CARBONATE 1600 MG: 800 TABLET, FILM COATED ORAL at 17:12

## 2024-09-06 RX ADMIN — VENLAFAXINE HYDROCHLORIDE 75 MG: 75 CAPSULE, EXTENDED RELEASE ORAL at 08:08

## 2024-09-06 RX ADMIN — ASPIRIN 81 MG 81 MG: 81 TABLET ORAL at 08:08

## 2024-09-06 RX ADMIN — SODIUM BICARBONATE 1300 MG: 650 TABLET ORAL at 20:30

## 2024-09-06 RX ADMIN — INSULIN LISPRO 4 UNITS: 100 INJECTION, SOLUTION INTRAVENOUS; SUBCUTANEOUS at 20:30

## 2024-09-06 RX ADMIN — BUSPIRONE HYDROCHLORIDE 10 MG: 10 TABLET ORAL at 08:08

## 2024-09-06 RX ADMIN — PRASUGREL 10 MG: 10 TABLET, FILM COATED ORAL at 08:17

## 2024-09-06 RX ADMIN — INSULIN LISPRO 15 UNITS: 100 INJECTION, SOLUTION INTRAVENOUS; SUBCUTANEOUS at 08:15

## 2024-09-06 RX ADMIN — INSULIN GLARGINE 72 UNITS: 100 INJECTION, SOLUTION SUBCUTANEOUS at 08:09

## 2024-09-06 RX ADMIN — INSULIN LISPRO 15 UNITS: 100 INJECTION, SOLUTION INTRAVENOUS; SUBCUTANEOUS at 17:12

## 2024-09-06 RX ADMIN — FERROUS SULFATE TAB 325 MG (65 MG ELEMENTAL FE) 325 MG: 325 (65 FE) TAB at 08:08

## 2024-09-06 RX ADMIN — SODIUM BICARBONATE 1300 MG: 650 TABLET ORAL at 08:08

## 2024-09-06 RX ADMIN — DOCUSATE SODIUM 100 MG: 100 CAPSULE, LIQUID FILLED ORAL at 20:30

## 2024-09-06 RX ADMIN — CYCLOBENZAPRINE 5 MG: 10 TABLET, FILM COATED ORAL at 19:04

## 2024-09-06 RX ADMIN — ATORVASTATIN CALCIUM 80 MG: 80 TABLET, FILM COATED ORAL at 20:30

## 2024-09-06 RX ADMIN — INSULIN GLARGINE 42 UNITS: 100 INJECTION, SOLUTION SUBCUTANEOUS at 20:30

## 2024-09-06 RX ADMIN — CARVEDILOL 3.12 MG: 3.12 TABLET, FILM COATED ORAL at 20:30

## 2024-09-06 RX ADMIN — DOCUSATE SODIUM 100 MG: 100 CAPSULE, LIQUID FILLED ORAL at 08:08

## 2024-09-06 RX ADMIN — ALLOPURINOL 100 MG: 100 TABLET ORAL at 08:08

## 2024-09-06 RX ADMIN — SEVELAMER CARBONATE 1600 MG: 800 TABLET, FILM COATED ORAL at 08:08

## 2024-09-06 RX ADMIN — HYDROCODONE BITARTRATE AND ACETAMINOPHEN 1 TABLET: 5; 325 TABLET ORAL at 20:30

## 2024-09-06 RX ADMIN — CARVEDILOL 3.12 MG: 3.12 TABLET, FILM COATED ORAL at 08:08

## 2024-09-06 RX ADMIN — DICLOFENAC SODIUM 4 G: 10 GEL TOPICAL at 20:30

## 2024-09-06 RX ADMIN — DICLOFENAC SODIUM 4 G: 10 GEL TOPICAL at 08:15

## 2024-09-06 ASSESSMENT — PAIN DESCRIPTION - LOCATION
LOCATION: BACK

## 2024-09-06 ASSESSMENT — PAIN SCALES - GENERAL
PAINLEVEL_OUTOF10: 6
PAINLEVEL_OUTOF10: 10
PAINLEVEL_OUTOF10: 4

## 2024-09-06 ASSESSMENT — PAIN DESCRIPTION - DESCRIPTORS
DESCRIPTORS: ACHING;CRUSHING
DESCRIPTORS: PRESSURE;CRAMPING
DESCRIPTORS: CRAMPING;SPASM

## 2024-09-06 ASSESSMENT — PAIN DESCRIPTION - ORIENTATION
ORIENTATION: MID;RIGHT
ORIENTATION: MID
ORIENTATION: RIGHT;LOWER

## 2024-09-07 LAB
GLUCOSE BLD-MCNC: 160 MG/DL (ref 65–105)
GLUCOSE BLD-MCNC: 173 MG/DL (ref 65–105)
GLUCOSE BLD-MCNC: 190 MG/DL (ref 65–105)
GLUCOSE BLD-MCNC: 297 MG/DL (ref 65–105)
GLUCOSE BLD-MCNC: 310 MG/DL (ref 65–105)

## 2024-09-07 PROCEDURE — G0378 HOSPITAL OBSERVATION PER HR: HCPCS

## 2024-09-07 PROCEDURE — 82947 ASSAY GLUCOSE BLOOD QUANT: CPT

## 2024-09-07 PROCEDURE — 6370000000 HC RX 637 (ALT 250 FOR IP): Performed by: STUDENT IN AN ORGANIZED HEALTH CARE EDUCATION/TRAINING PROGRAM

## 2024-09-07 PROCEDURE — 6370000000 HC RX 637 (ALT 250 FOR IP): Performed by: INTERNAL MEDICINE

## 2024-09-07 PROCEDURE — 6370000000 HC RX 637 (ALT 250 FOR IP): Performed by: FAMILY MEDICINE

## 2024-09-07 PROCEDURE — 99232 SBSQ HOSP IP/OBS MODERATE 35: CPT | Performed by: FAMILY MEDICINE

## 2024-09-07 RX ADMIN — BUSPIRONE HYDROCHLORIDE 10 MG: 10 TABLET ORAL at 14:57

## 2024-09-07 RX ADMIN — PRASUGREL 10 MG: 10 TABLET, FILM COATED ORAL at 09:00

## 2024-09-07 RX ADMIN — BUSPIRONE HYDROCHLORIDE 10 MG: 10 TABLET ORAL at 08:58

## 2024-09-07 RX ADMIN — SEVELAMER CARBONATE 1600 MG: 800 TABLET, FILM COATED ORAL at 17:18

## 2024-09-07 RX ADMIN — HYDROCODONE BITARTRATE AND ACETAMINOPHEN 1 TABLET: 5; 325 TABLET ORAL at 21:09

## 2024-09-07 RX ADMIN — INSULIN LISPRO 15 UNITS: 100 INJECTION, SOLUTION INTRAVENOUS; SUBCUTANEOUS at 11:59

## 2024-09-07 RX ADMIN — ATORVASTATIN CALCIUM 80 MG: 80 TABLET, FILM COATED ORAL at 21:09

## 2024-09-07 RX ADMIN — INSULIN LISPRO 15 UNITS: 100 INJECTION, SOLUTION INTRAVENOUS; SUBCUTANEOUS at 17:18

## 2024-09-07 RX ADMIN — INSULIN LISPRO 8 UNITS: 100 INJECTION, SOLUTION INTRAVENOUS; SUBCUTANEOUS at 11:59

## 2024-09-07 RX ADMIN — ALLOPURINOL 100 MG: 100 TABLET ORAL at 08:58

## 2024-09-07 RX ADMIN — INSULIN LISPRO 15 UNITS: 100 INJECTION, SOLUTION INTRAVENOUS; SUBCUTANEOUS at 08:58

## 2024-09-07 RX ADMIN — ASPIRIN 81 MG 81 MG: 81 TABLET ORAL at 08:58

## 2024-09-07 RX ADMIN — INSULIN GLARGINE 42 UNITS: 100 INJECTION, SOLUTION SUBCUTANEOUS at 21:12

## 2024-09-07 RX ADMIN — FERROUS SULFATE TAB 325 MG (65 MG ELEMENTAL FE) 325 MG: 325 (65 FE) TAB at 08:58

## 2024-09-07 RX ADMIN — INSULIN GLARGINE 72 UNITS: 100 INJECTION, SOLUTION SUBCUTANEOUS at 08:58

## 2024-09-07 RX ADMIN — DICLOFENAC SODIUM 4 G: 10 GEL TOPICAL at 09:33

## 2024-09-07 RX ADMIN — SEVELAMER CARBONATE 1600 MG: 800 TABLET, FILM COATED ORAL at 12:00

## 2024-09-07 RX ADMIN — DOCUSATE SODIUM 100 MG: 100 CAPSULE, LIQUID FILLED ORAL at 08:58

## 2024-09-07 RX ADMIN — BUSPIRONE HYDROCHLORIDE 10 MG: 10 TABLET ORAL at 21:09

## 2024-09-07 RX ADMIN — CARVEDILOL 3.12 MG: 3.12 TABLET, FILM COATED ORAL at 08:58

## 2024-09-07 RX ADMIN — DOCUSATE SODIUM 100 MG: 100 CAPSULE, LIQUID FILLED ORAL at 21:09

## 2024-09-07 RX ADMIN — DICLOFENAC SODIUM 4 G: 10 GEL TOPICAL at 21:10

## 2024-09-07 RX ADMIN — VENLAFAXINE HYDROCHLORIDE 75 MG: 75 CAPSULE, EXTENDED RELEASE ORAL at 08:58

## 2024-09-07 RX ADMIN — SODIUM BICARBONATE 1300 MG: 650 TABLET ORAL at 08:58

## 2024-09-07 RX ADMIN — INSULIN LISPRO 4 UNITS: 100 INJECTION, SOLUTION INTRAVENOUS; SUBCUTANEOUS at 17:18

## 2024-09-07 RX ADMIN — SEVELAMER CARBONATE 1600 MG: 800 TABLET, FILM COATED ORAL at 08:58

## 2024-09-07 RX ADMIN — SODIUM BICARBONATE 1300 MG: 650 TABLET ORAL at 21:09

## 2024-09-07 RX ADMIN — Medication 10.5 MG: at 21:09

## 2024-09-07 RX ADMIN — CARVEDILOL 3.12 MG: 3.12 TABLET, FILM COATED ORAL at 21:09

## 2024-09-07 RX ADMIN — DICLOFENAC SODIUM 4 G: 10 GEL TOPICAL at 15:21

## 2024-09-07 ASSESSMENT — PAIN SCALES - GENERAL
PAINLEVEL_OUTOF10: 4
PAINLEVEL_OUTOF10: 5
PAINLEVEL_OUTOF10: 2
PAINLEVEL_OUTOF10: 4

## 2024-09-07 ASSESSMENT — PAIN DESCRIPTION - ORIENTATION: ORIENTATION: MID;UPPER;LOWER;LEFT;RIGHT

## 2024-09-07 ASSESSMENT — PAIN DESCRIPTION - LOCATION
LOCATION: BACK

## 2024-09-08 LAB
GLUCOSE BLD-MCNC: 203 MG/DL (ref 65–105)
GLUCOSE BLD-MCNC: 203 MG/DL (ref 65–105)
GLUCOSE BLD-MCNC: 252 MG/DL (ref 65–105)
GLUCOSE BLD-MCNC: 299 MG/DL (ref 65–105)

## 2024-09-08 PROCEDURE — 99232 SBSQ HOSP IP/OBS MODERATE 35: CPT | Performed by: FAMILY MEDICINE

## 2024-09-08 PROCEDURE — 6370000000 HC RX 637 (ALT 250 FOR IP): Performed by: FAMILY MEDICINE

## 2024-09-08 PROCEDURE — 6370000000 HC RX 637 (ALT 250 FOR IP): Performed by: STUDENT IN AN ORGANIZED HEALTH CARE EDUCATION/TRAINING PROGRAM

## 2024-09-08 PROCEDURE — 6370000000 HC RX 637 (ALT 250 FOR IP): Performed by: INTERNAL MEDICINE

## 2024-09-08 PROCEDURE — G0378 HOSPITAL OBSERVATION PER HR: HCPCS

## 2024-09-08 RX ADMIN — INSULIN LISPRO 15 UNITS: 100 INJECTION, SOLUTION INTRAVENOUS; SUBCUTANEOUS at 17:06

## 2024-09-08 RX ADMIN — SODIUM BICARBONATE 1300 MG: 650 TABLET ORAL at 21:19

## 2024-09-08 RX ADMIN — INSULIN LISPRO 4 UNITS: 100 INJECTION, SOLUTION INTRAVENOUS; SUBCUTANEOUS at 09:08

## 2024-09-08 RX ADMIN — CARVEDILOL 3.12 MG: 3.12 TABLET, FILM COATED ORAL at 10:40

## 2024-09-08 RX ADMIN — SODIUM BICARBONATE 1300 MG: 650 TABLET ORAL at 10:40

## 2024-09-08 RX ADMIN — INSULIN LISPRO 15 UNITS: 100 INJECTION, SOLUTION INTRAVENOUS; SUBCUTANEOUS at 11:51

## 2024-09-08 RX ADMIN — SEVELAMER CARBONATE 1600 MG: 800 TABLET, FILM COATED ORAL at 10:39

## 2024-09-08 RX ADMIN — INSULIN GLARGINE 72 UNITS: 100 INJECTION, SOLUTION SUBCUTANEOUS at 09:07

## 2024-09-08 RX ADMIN — BUSPIRONE HYDROCHLORIDE 10 MG: 10 TABLET ORAL at 10:40

## 2024-09-08 RX ADMIN — ALLOPURINOL 100 MG: 100 TABLET ORAL at 10:40

## 2024-09-08 RX ADMIN — DOCUSATE SODIUM 100 MG: 100 CAPSULE, LIQUID FILLED ORAL at 10:40

## 2024-09-08 RX ADMIN — SEVELAMER CARBONATE 1600 MG: 800 TABLET, FILM COATED ORAL at 17:06

## 2024-09-08 RX ADMIN — CARVEDILOL 3.12 MG: 3.12 TABLET, FILM COATED ORAL at 21:20

## 2024-09-08 RX ADMIN — DICLOFENAC SODIUM 4 G: 10 GEL TOPICAL at 21:20

## 2024-09-08 RX ADMIN — INSULIN LISPRO 15 UNITS: 100 INJECTION, SOLUTION INTRAVENOUS; SUBCUTANEOUS at 09:08

## 2024-09-08 RX ADMIN — INSULIN LISPRO 4 UNITS: 100 INJECTION, SOLUTION INTRAVENOUS; SUBCUTANEOUS at 17:06

## 2024-09-08 RX ADMIN — BUSPIRONE HYDROCHLORIDE 10 MG: 10 TABLET ORAL at 21:19

## 2024-09-08 RX ADMIN — Medication 10.5 MG: at 21:19

## 2024-09-08 RX ADMIN — INSULIN GLARGINE 42 UNITS: 100 INJECTION, SOLUTION SUBCUTANEOUS at 21:19

## 2024-09-08 RX ADMIN — SEVELAMER CARBONATE 1600 MG: 800 TABLET, FILM COATED ORAL at 11:54

## 2024-09-08 RX ADMIN — CYCLOBENZAPRINE 5 MG: 10 TABLET, FILM COATED ORAL at 21:19

## 2024-09-08 RX ADMIN — DOCUSATE SODIUM 100 MG: 100 CAPSULE, LIQUID FILLED ORAL at 21:20

## 2024-09-08 RX ADMIN — INSULIN LISPRO 8 UNITS: 100 INJECTION, SOLUTION INTRAVENOUS; SUBCUTANEOUS at 11:52

## 2024-09-08 RX ADMIN — HYDROCODONE BITARTRATE AND ACETAMINOPHEN 1 TABLET: 5; 325 TABLET ORAL at 21:20

## 2024-09-08 RX ADMIN — BUSPIRONE HYDROCHLORIDE 10 MG: 10 TABLET ORAL at 14:43

## 2024-09-08 RX ADMIN — VENLAFAXINE HYDROCHLORIDE 75 MG: 75 CAPSULE, EXTENDED RELEASE ORAL at 10:42

## 2024-09-08 RX ADMIN — PRASUGREL 10 MG: 10 TABLET, FILM COATED ORAL at 09:07

## 2024-09-08 RX ADMIN — FERROUS SULFATE TAB 325 MG (65 MG ELEMENTAL FE) 325 MG: 325 (65 FE) TAB at 10:40

## 2024-09-08 RX ADMIN — DICLOFENAC SODIUM 4 G: 10 GEL TOPICAL at 08:44

## 2024-09-08 RX ADMIN — ATORVASTATIN CALCIUM 80 MG: 80 TABLET, FILM COATED ORAL at 21:19

## 2024-09-08 RX ADMIN — ASPIRIN 81 MG 81 MG: 81 TABLET ORAL at 10:40

## 2024-09-08 ASSESSMENT — PAIN SCALES - GENERAL
PAINLEVEL_OUTOF10: 8
PAINLEVEL_OUTOF10: 4

## 2024-09-08 ASSESSMENT — PAIN DESCRIPTION - LOCATION
LOCATION: BACK
LOCATION: BACK

## 2024-09-08 ASSESSMENT — PAIN DESCRIPTION - ORIENTATION
ORIENTATION: LOWER
ORIENTATION: LOWER;MID;RIGHT;LEFT

## 2024-09-08 ASSESSMENT — PAIN DESCRIPTION - DESCRIPTORS: DESCRIPTORS: ACHING;SPASM

## 2024-09-09 ENCOUNTER — TELEPHONE (OUTPATIENT)
Dept: ORTHOPEDIC SURGERY | Age: 64
End: 2024-09-09

## 2024-09-09 LAB
ANION GAP SERPL CALCULATED.3IONS-SCNC: 14 MMOL/L (ref 9–17)
BUN SERPL-MCNC: 51 MG/DL (ref 8–23)
CALCIUM SERPL-MCNC: 9 MG/DL (ref 8.6–10.4)
CHLORIDE SERPL-SCNC: 95 MMOL/L (ref 98–107)
CO2 SERPL-SCNC: 25 MMOL/L (ref 20–31)
CREAT SERPL-MCNC: 5.2 MG/DL (ref 0.5–0.9)
GFR, ESTIMATED: 9 ML/MIN/1.73M2
GLUCOSE SERPL-MCNC: 316 MG/DL (ref 70–99)
POTASSIUM SERPL-SCNC: 4.5 MMOL/L (ref 3.7–5.3)
SODIUM SERPL-SCNC: 134 MMOL/L (ref 135–144)

## 2024-09-09 PROCEDURE — 2580000003 HC RX 258: Performed by: STUDENT IN AN ORGANIZED HEALTH CARE EDUCATION/TRAINING PROGRAM

## 2024-09-09 PROCEDURE — 80048 BASIC METABOLIC PNL TOTAL CA: CPT

## 2024-09-09 PROCEDURE — 99232 SBSQ HOSP IP/OBS MODERATE 35: CPT | Performed by: FAMILY MEDICINE

## 2024-09-09 PROCEDURE — 6370000000 HC RX 637 (ALT 250 FOR IP): Performed by: FAMILY MEDICINE

## 2024-09-09 PROCEDURE — 90935 HEMODIALYSIS ONE EVALUATION: CPT

## 2024-09-09 PROCEDURE — 6370000000 HC RX 637 (ALT 250 FOR IP): Performed by: INTERNAL MEDICINE

## 2024-09-09 PROCEDURE — G0378 HOSPITAL OBSERVATION PER HR: HCPCS

## 2024-09-09 PROCEDURE — 6360000002 HC RX W HCPCS: Performed by: STUDENT IN AN ORGANIZED HEALTH CARE EDUCATION/TRAINING PROGRAM

## 2024-09-09 PROCEDURE — 6370000000 HC RX 637 (ALT 250 FOR IP): Performed by: STUDENT IN AN ORGANIZED HEALTH CARE EDUCATION/TRAINING PROGRAM

## 2024-09-09 RX ORDER — LIDOCAINE/PRILOCAINE 2.5 %-2.5%
CREAM (GRAM) TOPICAL
Qty: 60 G | Refills: 1 | Status: SHIPPED | OUTPATIENT
Start: 2024-09-09

## 2024-09-09 RX ADMIN — FERROUS SULFATE TAB 325 MG (65 MG ELEMENTAL FE) 325 MG: 325 (65 FE) TAB at 15:21

## 2024-09-09 RX ADMIN — SEVELAMER CARBONATE 1600 MG: 800 TABLET, FILM COATED ORAL at 17:15

## 2024-09-09 RX ADMIN — SODIUM BICARBONATE 1300 MG: 650 TABLET ORAL at 22:14

## 2024-09-09 RX ADMIN — INSULIN GLARGINE 42 UNITS: 100 INJECTION, SOLUTION SUBCUTANEOUS at 22:15

## 2024-09-09 RX ADMIN — ATORVASTATIN CALCIUM 80 MG: 80 TABLET, FILM COATED ORAL at 22:14

## 2024-09-09 RX ADMIN — INSULIN LISPRO 4 UNITS: 100 INJECTION, SOLUTION INTRAVENOUS; SUBCUTANEOUS at 17:14

## 2024-09-09 RX ADMIN — DOCUSATE SODIUM 100 MG: 100 CAPSULE, LIQUID FILLED ORAL at 22:15

## 2024-09-09 RX ADMIN — CARVEDILOL 3.12 MG: 3.12 TABLET, FILM COATED ORAL at 22:14

## 2024-09-09 RX ADMIN — INSULIN LISPRO 15 UNITS: 100 INJECTION, SOLUTION INTRAVENOUS; SUBCUTANEOUS at 17:15

## 2024-09-09 RX ADMIN — INSULIN LISPRO 4 UNITS: 100 INJECTION, SOLUTION INTRAVENOUS; SUBCUTANEOUS at 22:15

## 2024-09-09 RX ADMIN — ALLOPURINOL 100 MG: 100 TABLET ORAL at 15:21

## 2024-09-09 RX ADMIN — BUSPIRONE HYDROCHLORIDE 10 MG: 10 TABLET ORAL at 15:21

## 2024-09-09 RX ADMIN — VENLAFAXINE HYDROCHLORIDE 75 MG: 75 CAPSULE, EXTENDED RELEASE ORAL at 15:22

## 2024-09-09 RX ADMIN — DICLOFENAC SODIUM 4 G: 10 GEL TOPICAL at 22:38

## 2024-09-09 RX ADMIN — ASPIRIN 81 MG 81 MG: 81 TABLET ORAL at 15:21

## 2024-09-09 RX ADMIN — HYDROCODONE BITARTRATE AND ACETAMINOPHEN 1 TABLET: 5; 325 TABLET ORAL at 22:34

## 2024-09-09 RX ADMIN — BUSPIRONE HYDROCHLORIDE 10 MG: 10 TABLET ORAL at 22:15

## 2024-09-09 RX ADMIN — PRASUGREL 10 MG: 10 TABLET, FILM COATED ORAL at 15:22

## 2024-09-09 RX ADMIN — CYCLOBENZAPRINE 5 MG: 10 TABLET, FILM COATED ORAL at 22:34

## 2024-09-09 RX ADMIN — Medication 10.5 MG: at 22:34

## 2024-09-09 RX ADMIN — SODIUM CHLORIDE, PRESERVATIVE FREE 10 ML: 5 INJECTION INTRAVENOUS at 22:16

## 2024-09-09 ASSESSMENT — PAIN DESCRIPTION - LOCATION: LOCATION: BACK

## 2024-09-09 ASSESSMENT — PAIN SCALES - GENERAL
PAINLEVEL_OUTOF10: 8
PAINLEVEL_OUTOF10: 10

## 2024-09-10 PROCEDURE — 6370000000 HC RX 637 (ALT 250 FOR IP): Performed by: INTERNAL MEDICINE

## 2024-09-10 PROCEDURE — 6370000000 HC RX 637 (ALT 250 FOR IP): Performed by: STUDENT IN AN ORGANIZED HEALTH CARE EDUCATION/TRAINING PROGRAM

## 2024-09-10 PROCEDURE — G0378 HOSPITAL OBSERVATION PER HR: HCPCS

## 2024-09-10 PROCEDURE — 82947 ASSAY GLUCOSE BLOOD QUANT: CPT

## 2024-09-10 PROCEDURE — 97110 THERAPEUTIC EXERCISES: CPT

## 2024-09-10 PROCEDURE — 6370000000 HC RX 637 (ALT 250 FOR IP): Performed by: FAMILY MEDICINE

## 2024-09-10 PROCEDURE — 99232 SBSQ HOSP IP/OBS MODERATE 35: CPT | Performed by: FAMILY MEDICINE

## 2024-09-10 PROCEDURE — 97116 GAIT TRAINING THERAPY: CPT

## 2024-09-10 RX ADMIN — HYDROCODONE BITARTRATE AND ACETAMINOPHEN 1 TABLET: 5; 325 TABLET ORAL at 21:46

## 2024-09-10 RX ADMIN — SEVELAMER CARBONATE 1600 MG: 800 TABLET, FILM COATED ORAL at 09:30

## 2024-09-10 RX ADMIN — BUSPIRONE HYDROCHLORIDE 10 MG: 10 TABLET ORAL at 09:31

## 2024-09-10 RX ADMIN — Medication 10.5 MG: at 21:46

## 2024-09-10 RX ADMIN — SODIUM BICARBONATE 1300 MG: 650 TABLET ORAL at 09:30

## 2024-09-10 RX ADMIN — PRASUGREL 10 MG: 10 TABLET, FILM COATED ORAL at 09:37

## 2024-09-10 RX ADMIN — BUSPIRONE HYDROCHLORIDE 10 MG: 10 TABLET ORAL at 13:45

## 2024-09-10 RX ADMIN — INSULIN LISPRO 15 UNITS: 100 INJECTION, SOLUTION INTRAVENOUS; SUBCUTANEOUS at 11:46

## 2024-09-10 RX ADMIN — ASPIRIN 81 MG 81 MG: 81 TABLET ORAL at 09:31

## 2024-09-10 RX ADMIN — BUSPIRONE HYDROCHLORIDE 10 MG: 10 TABLET ORAL at 21:30

## 2024-09-10 RX ADMIN — VENLAFAXINE HYDROCHLORIDE 75 MG: 75 CAPSULE, EXTENDED RELEASE ORAL at 09:30

## 2024-09-10 RX ADMIN — INSULIN GLARGINE 42 UNITS: 100 INJECTION, SOLUTION SUBCUTANEOUS at 21:31

## 2024-09-10 RX ADMIN — FERROUS SULFATE TAB 325 MG (65 MG ELEMENTAL FE) 325 MG: 325 (65 FE) TAB at 09:31

## 2024-09-10 RX ADMIN — INSULIN LISPRO 4 UNITS: 100 INJECTION, SOLUTION INTRAVENOUS; SUBCUTANEOUS at 16:45

## 2024-09-10 RX ADMIN — INSULIN GLARGINE 72 UNITS: 100 INJECTION, SOLUTION SUBCUTANEOUS at 09:35

## 2024-09-10 RX ADMIN — INSULIN LISPRO 4 UNITS: 100 INJECTION, SOLUTION INTRAVENOUS; SUBCUTANEOUS at 09:44

## 2024-09-10 RX ADMIN — INSULIN LISPRO 15 UNITS: 100 INJECTION, SOLUTION INTRAVENOUS; SUBCUTANEOUS at 16:46

## 2024-09-10 RX ADMIN — SEVELAMER CARBONATE 1600 MG: 800 TABLET, FILM COATED ORAL at 16:44

## 2024-09-10 RX ADMIN — DOCUSATE SODIUM 100 MG: 100 CAPSULE, LIQUID FILLED ORAL at 09:30

## 2024-09-10 RX ADMIN — CARVEDILOL 3.12 MG: 3.12 TABLET, FILM COATED ORAL at 09:36

## 2024-09-10 RX ADMIN — DICLOFENAC SODIUM 4 G: 10 GEL TOPICAL at 17:46

## 2024-09-10 RX ADMIN — DICLOFENAC SODIUM 4 G: 10 GEL TOPICAL at 21:47

## 2024-09-10 RX ADMIN — DOCUSATE SODIUM 100 MG: 100 CAPSULE, LIQUID FILLED ORAL at 21:30

## 2024-09-10 RX ADMIN — ATORVASTATIN CALCIUM 80 MG: 80 TABLET, FILM COATED ORAL at 21:31

## 2024-09-10 RX ADMIN — ALLOPURINOL 100 MG: 100 TABLET ORAL at 09:31

## 2024-09-10 RX ADMIN — DICLOFENAC SODIUM 4 G: 10 GEL TOPICAL at 09:47

## 2024-09-10 RX ADMIN — SEVELAMER CARBONATE 1600 MG: 800 TABLET, FILM COATED ORAL at 11:46

## 2024-09-10 RX ADMIN — INSULIN LISPRO 8 UNITS: 100 INJECTION, SOLUTION INTRAVENOUS; SUBCUTANEOUS at 11:46

## 2024-09-10 RX ADMIN — SODIUM BICARBONATE 1300 MG: 650 TABLET ORAL at 21:31

## 2024-09-10 RX ADMIN — CYCLOBENZAPRINE 5 MG: 10 TABLET, FILM COATED ORAL at 21:46

## 2024-09-10 RX ADMIN — CARVEDILOL 3.12 MG: 3.12 TABLET, FILM COATED ORAL at 21:30

## 2024-09-10 RX ADMIN — INSULIN LISPRO 15 UNITS: 100 INJECTION, SOLUTION INTRAVENOUS; SUBCUTANEOUS at 09:45

## 2024-09-10 ASSESSMENT — PAIN SCALES - GENERAL
PAINLEVEL_OUTOF10: 6
PAINLEVEL_OUTOF10: 5
PAINLEVEL_OUTOF10: 7
PAINLEVEL_OUTOF10: 8
PAINLEVEL_OUTOF10: 5
PAINLEVEL_OUTOF10: 5

## 2024-09-10 ASSESSMENT — PAIN DESCRIPTION - LOCATION
LOCATION: BACK
LOCATION: BACK;NECK
LOCATION: BACK

## 2024-09-10 ASSESSMENT — PAIN DESCRIPTION - ORIENTATION
ORIENTATION: LOWER;MID
ORIENTATION: LOWER;MID
ORIENTATION: LEFT;RIGHT

## 2024-09-10 ASSESSMENT — PAIN SCALES - WONG BAKER
WONGBAKER_NUMERICALRESPONSE: NO HURT
WONGBAKER_NUMERICALRESPONSE: NO HURT

## 2024-09-10 ASSESSMENT — PAIN DESCRIPTION - DESCRIPTORS
DESCRIPTORS: STABBING
DESCRIPTORS: ACHING
DESCRIPTORS: ACHING

## 2024-09-11 VITALS
TEMPERATURE: 97.9 F | BODY MASS INDEX: 41.95 KG/M2 | WEIGHT: 251.77 LBS | DIASTOLIC BLOOD PRESSURE: 58 MMHG | HEIGHT: 65 IN | RESPIRATION RATE: 16 BRPM | SYSTOLIC BLOOD PRESSURE: 145 MMHG | OXYGEN SATURATION: 94 % | HEART RATE: 67 BPM

## 2024-09-11 LAB
ANION GAP SERPL CALCULATED.3IONS-SCNC: 13 MMOL/L (ref 9–17)
BUN SERPL-MCNC: 48 MG/DL (ref 8–23)
CALCIUM SERPL-MCNC: 9.1 MG/DL (ref 8.6–10.4)
CHLORIDE SERPL-SCNC: 98 MMOL/L (ref 98–107)
CO2 SERPL-SCNC: 27 MMOL/L (ref 20–31)
CREAT SERPL-MCNC: 4.6 MG/DL (ref 0.5–0.9)
GFR, ESTIMATED: 10 ML/MIN/1.73M2
GLUCOSE BLD-MCNC: 108 MG/DL (ref 65–105)
GLUCOSE BLD-MCNC: 205 MG/DL (ref 65–105)
GLUCOSE BLD-MCNC: 220 MG/DL (ref 65–105)
GLUCOSE BLD-MCNC: 245 MG/DL (ref 65–105)
GLUCOSE BLD-MCNC: 257 MG/DL (ref 65–105)
GLUCOSE BLD-MCNC: 286 MG/DL (ref 65–105)
GLUCOSE SERPL-MCNC: 122 MG/DL (ref 70–99)
POTASSIUM SERPL-SCNC: 3.9 MMOL/L (ref 3.7–5.3)
SODIUM SERPL-SCNC: 138 MMOL/L (ref 135–144)

## 2024-09-11 PROCEDURE — 80048 BASIC METABOLIC PNL TOTAL CA: CPT

## 2024-09-11 PROCEDURE — G0378 HOSPITAL OBSERVATION PER HR: HCPCS

## 2024-09-11 PROCEDURE — 90935 HEMODIALYSIS ONE EVALUATION: CPT

## 2024-09-11 PROCEDURE — 6370000000 HC RX 637 (ALT 250 FOR IP): Performed by: STUDENT IN AN ORGANIZED HEALTH CARE EDUCATION/TRAINING PROGRAM

## 2024-09-11 PROCEDURE — 36415 COLL VENOUS BLD VENIPUNCTURE: CPT

## 2024-09-11 RX ADMIN — FERROUS SULFATE TAB 325 MG (65 MG ELEMENTAL FE) 325 MG: 325 (65 FE) TAB at 09:47

## 2024-09-11 RX ADMIN — DICLOFENAC SODIUM 4 G: 10 GEL TOPICAL at 16:37

## 2024-09-11 RX ADMIN — BUSPIRONE HYDROCHLORIDE 10 MG: 10 TABLET ORAL at 09:46

## 2024-09-11 RX ADMIN — ASPIRIN 81 MG 81 MG: 81 TABLET ORAL at 09:45

## 2024-09-11 RX ADMIN — INSULIN GLARGINE 72 UNITS: 100 INJECTION, SOLUTION SUBCUTANEOUS at 09:44

## 2024-09-11 RX ADMIN — INSULIN LISPRO 15 UNITS: 100 INJECTION, SOLUTION INTRAVENOUS; SUBCUTANEOUS at 16:36

## 2024-09-11 RX ADMIN — SEVELAMER CARBONATE 1600 MG: 800 TABLET, FILM COATED ORAL at 09:47

## 2024-09-11 RX ADMIN — ALLOPURINOL 100 MG: 100 TABLET ORAL at 09:46

## 2024-09-11 RX ADMIN — INSULIN LISPRO 15 UNITS: 100 INJECTION, SOLUTION INTRAVENOUS; SUBCUTANEOUS at 10:45

## 2024-09-11 RX ADMIN — PRASUGREL 10 MG: 10 TABLET, FILM COATED ORAL at 09:49

## 2024-09-11 RX ADMIN — SODIUM BICARBONATE 1300 MG: 650 TABLET ORAL at 09:47

## 2024-09-11 RX ADMIN — DICLOFENAC SODIUM 4 G: 10 GEL TOPICAL at 09:49

## 2024-09-11 RX ADMIN — INSULIN LISPRO 4 UNITS: 100 INJECTION, SOLUTION INTRAVENOUS; SUBCUTANEOUS at 16:36

## 2024-09-11 RX ADMIN — SEVELAMER CARBONATE 1600 MG: 800 TABLET, FILM COATED ORAL at 15:10

## 2024-09-11 RX ADMIN — DOCUSATE SODIUM 100 MG: 100 CAPSULE, LIQUID FILLED ORAL at 09:46

## 2024-09-11 RX ADMIN — INSULIN LISPRO 4 UNITS: 100 INJECTION, SOLUTION INTRAVENOUS; SUBCUTANEOUS at 11:55

## 2024-09-11 RX ADMIN — BUSPIRONE HYDROCHLORIDE 10 MG: 10 TABLET ORAL at 15:11

## 2024-09-11 RX ADMIN — VENLAFAXINE HYDROCHLORIDE 75 MG: 75 CAPSULE, EXTENDED RELEASE ORAL at 09:46

## 2024-09-11 ASSESSMENT — PAIN DESCRIPTION - DESCRIPTORS
DESCRIPTORS: ACHING
DESCRIPTORS: ACHING

## 2024-09-11 ASSESSMENT — PAIN DESCRIPTION - PAIN TYPE
TYPE: CHRONIC PAIN
TYPE: CHRONIC PAIN

## 2024-09-11 ASSESSMENT — PAIN DESCRIPTION - LOCATION
LOCATION: BACK

## 2024-09-11 ASSESSMENT — PAIN DESCRIPTION - ORIENTATION
ORIENTATION: LOWER
ORIENTATION: LOWER
ORIENTATION: LOWER;MID

## 2024-09-11 ASSESSMENT — PAIN SCALES - GENERAL
PAINLEVEL_OUTOF10: 7
PAINLEVEL_OUTOF10: 7
PAINLEVEL_OUTOF10: 6
PAINLEVEL_OUTOF10: 6

## 2024-09-12 ENCOUNTER — CARE COORDINATION (OUTPATIENT)
Dept: CARE COORDINATION | Age: 64
End: 2024-09-12

## 2024-09-13 ENCOUNTER — CARE COORDINATION (OUTPATIENT)
Dept: CARE COORDINATION | Age: 64
End: 2024-09-13

## 2024-09-21 PROBLEM — R79.89 ELEVATED TROPONIN: Status: RESOLVED | Noted: 2023-08-03 | Resolved: 2024-09-21

## 2024-09-23 ENCOUNTER — OFFICE VISIT (OUTPATIENT)
Dept: ORTHOPEDIC SURGERY | Age: 64
End: 2024-09-23
Payer: COMMERCIAL

## 2024-09-23 VITALS — HEIGHT: 65 IN | RESPIRATION RATE: 15 BRPM | WEIGHT: 242 LBS | BODY MASS INDEX: 40.32 KG/M2

## 2024-09-23 DIAGNOSIS — S52.591D OTHER CLOSED FRACTURE OF DISTAL END OF RIGHT RADIUS WITH ROUTINE HEALING, SUBSEQUENT ENCOUNTER: Primary | ICD-10-CM

## 2024-09-23 PROCEDURE — 99213 OFFICE O/P EST LOW 20 MIN: CPT

## 2024-09-23 PROCEDURE — 1090F PRES/ABSN URINE INCON ASSESS: CPT

## 2024-09-23 PROCEDURE — G8428 CUR MEDS NOT DOCUMENT: HCPCS

## 2024-09-23 PROCEDURE — G8417 CALC BMI ABV UP PARAM F/U: HCPCS

## 2024-09-23 PROCEDURE — 1123F ACP DISCUSS/DSCN MKR DOCD: CPT

## 2024-09-23 PROCEDURE — 1036F TOBACCO NON-USER: CPT

## 2024-09-23 PROCEDURE — 3017F COLORECTAL CA SCREEN DOC REV: CPT

## 2024-09-23 PROCEDURE — 1111F DSCHRG MED/CURRENT MED MERGE: CPT

## 2024-09-23 PROCEDURE — G8400 PT W/DXA NO RESULTS DOC: HCPCS

## 2024-09-26 ENCOUNTER — HOSPITAL ENCOUNTER (OUTPATIENT)
Dept: PHYSICAL THERAPY | Age: 64
Setting detail: THERAPIES SERIES
Discharge: HOME | End: 2024-09-26
Payer: COMMERCIAL

## 2024-09-26 PROCEDURE — 97162 PT EVAL MOD COMPLEX 30 MIN: CPT

## 2024-10-01 ENCOUNTER — HOSPITAL ENCOUNTER (OUTPATIENT)
Dept: OCCUPATIONAL THERAPY | Age: 64
Setting detail: THERAPIES SERIES
Discharge: HOME | End: 2024-10-01
Payer: COMMERCIAL

## 2024-10-01 PROCEDURE — 97166 OT EVAL MOD COMPLEX 45 MIN: CPT

## 2024-10-01 ASSESSMENT — PAIN DESCRIPTION - DESCRIPTORS: DESCRIPTORS: ACHING;SORE;THROBBING

## 2024-10-01 ASSESSMENT — PAIN DESCRIPTION - LOCATION: LOCATION: WRIST

## 2024-10-01 ASSESSMENT — 9 HOLE PEG TEST
TESTTIME_SECONDS: 32
TESTTIME_SECONDS: 34
TEST_RESULT: IMPAIRED
TEST_RESULT: IMPAIRED

## 2024-10-01 ASSESSMENT — PAIN SCALES - GENERAL: PAINLEVEL_OUTOF10: 3

## 2024-10-01 ASSESSMENT — PAIN DESCRIPTION - PAIN TYPE: TYPE: ACUTE PAIN

## 2024-10-01 ASSESSMENT — PAIN DESCRIPTION - ORIENTATION: ORIENTATION: RIGHT

## 2024-10-01 NOTE — THERAPY EVALUATION
laces on ADL cloth in OT.)  Sleeping:: No difficulty (No difficulty because of rt hand. Pt has difficulty sleeping d/t her back and sometimes she can't sleep.)  Laundering clothes (eg washing, ironing, folding):: Moderate difficulty (Pt puts clothes in dryer & on  (back pain) - moderate difficulty with rt hand.)  Opening a jar:: Moderate difficulty (peanut butter, miracle whip jar)  Throwing a ball:: A little bit of difficulty (Sore rt radial wrist when tossing ball in OT.)  Carrying a small suitcase with your affected limb:: Moderate difficulty (Pt held onto her purse in rt hand using rollator for few steps in  OT.)  UEFI Total Score: 38  UEFI Total Percentage: 47.5 %     Total Score (out of 80) - if applicable: 38   Current Percentage of Function: 47.5 % % (Date: 10/1/2024)    Interpretation of Score: The final UEFI score ranges between 0 and 80 points. Scores closer to 0 indicate severe limitation whilst scores closer to 80 indicate very little to no limitation. The significant change (MDC) between two subsequent evaluations is set at 9 points. Higher Score indicates less disability, more function.        ASSESSMENT     Impression: Assessment: Pt presents with rt wrist pain. Pt has stiffness in rt wrist & rt hand (index,middle finger) with AROM & PROM limitations. Pt reports pain when PROM  rt ( forearm supination,wrist flexion, wrist RD, rt index & middle finger) was tested.Pt's rt hand  ,rt pinch (lateral,willson, tip), & rt  hand 9 hole peg test is below the 10th percentile for norms. Pt has some swelling rt hand distal palm & rt wrist.  Pt reports that she has arthritis in her knuckles. Pt reports neuropathy in both hands & feet  & sometimes she get burning sensation in her hands. Pt's UEFI total socre is 38 out of possible 80 points which indicates that pt has difficulty using rt hand/wrist for ADL/IADLs.Pt reports difficulty starting to  zip up her coat,reaching with rt hand using knife  to get

## 2024-10-19 ENCOUNTER — HOSPITAL ENCOUNTER (EMERGENCY)
Age: 64
Discharge: HOME | End: 2024-10-20
Attending: STUDENT IN AN ORGANIZED HEALTH CARE EDUCATION/TRAINING PROGRAM
Payer: COMMERCIAL

## 2024-10-19 VITALS
TEMPERATURE: 98.6 F | RESPIRATION RATE: 22 BRPM | SYSTOLIC BLOOD PRESSURE: 160 MMHG | DIASTOLIC BLOOD PRESSURE: 69 MMHG | HEART RATE: 97 BPM | OXYGEN SATURATION: 93 %

## 2024-10-19 DIAGNOSIS — M62.838 SPASM OF MUSCLE: Primary | ICD-10-CM

## 2024-10-19 PROCEDURE — 99284 EMERGENCY DEPT VISIT MOD MDM: CPT

## 2024-10-19 PROCEDURE — 6370000000 HC RX 637 (ALT 250 FOR IP)

## 2024-10-19 PROCEDURE — 36415 COLL VENOUS BLD VENIPUNCTURE: CPT

## 2024-10-19 PROCEDURE — 80048 BASIC METABOLIC PNL TOTAL CA: CPT

## 2024-10-19 PROCEDURE — 6360000002 HC RX W HCPCS

## 2024-10-19 PROCEDURE — 96372 THER/PROPH/DIAG INJ SC/IM: CPT

## 2024-10-19 PROCEDURE — 83735 ASSAY OF MAGNESIUM: CPT

## 2024-10-19 RX ORDER — GABAPENTIN 300 MG/1
300 CAPSULE ORAL ONCE
Status: COMPLETED | OUTPATIENT
Start: 2024-10-19 | End: 2024-10-19

## 2024-10-19 RX ORDER — OXYCODONE HYDROCHLORIDE 5 MG/1
5 TABLET ORAL ONCE
Status: COMPLETED | OUTPATIENT
Start: 2024-10-20 | End: 2024-10-19

## 2024-10-19 RX ORDER — LIDOCAINE 4 G/G
1 PATCH TOPICAL ONCE
Status: DISCONTINUED | OUTPATIENT
Start: 2024-10-19 | End: 2024-10-20 | Stop reason: HOSPADM

## 2024-10-19 RX ORDER — ORPHENADRINE CITRATE 30 MG/ML
60 INJECTION INTRAMUSCULAR; INTRAVENOUS ONCE
Status: COMPLETED | OUTPATIENT
Start: 2024-10-19 | End: 2024-10-19

## 2024-10-19 RX ADMIN — GABAPENTIN 300 MG: 300 CAPSULE ORAL at 21:37

## 2024-10-19 RX ADMIN — OXYCODONE HYDROCHLORIDE 5 MG: 5 TABLET ORAL at 23:50

## 2024-10-19 RX ADMIN — ORPHENADRINE CITRATE 60 MG: 60 INJECTION INTRAMUSCULAR; INTRAVENOUS at 21:37

## 2024-10-19 ASSESSMENT — PAIN SCALES - GENERAL
PAINLEVEL_OUTOF10: 10
PAINLEVEL_OUTOF10: 10

## 2024-10-19 ASSESSMENT — LIFESTYLE VARIABLES: HOW OFTEN DO YOU HAVE A DRINK CONTAINING ALCOHOL: NEVER

## 2024-10-20 LAB
ANION GAP SERPL CALCULATED.3IONS-SCNC: 16 MMOL/L (ref 9–16)
BUN SERPL-MCNC: 55 MG/DL (ref 8–23)
CALCIUM SERPL-MCNC: 10.5 MG/DL (ref 8.6–10.4)
CHLORIDE SERPL-SCNC: 94 MMOL/L (ref 98–107)
CO2 SERPL-SCNC: 27 MMOL/L (ref 20–31)
CREAT SERPL-MCNC: 5 MG/DL (ref 0.7–1.2)
GFR, ESTIMATED: 9 ML/MIN/1.73M2
GLUCOSE SERPL-MCNC: 126 MG/DL (ref 74–99)
MAGNESIUM SERPL-MCNC: 3 MG/DL (ref 1.6–2.4)
POTASSIUM SERPL-SCNC: 4.1 MMOL/L (ref 3.7–5.3)
SODIUM SERPL-SCNC: 137 MMOL/L (ref 136–145)

## 2024-10-20 PROCEDURE — 6370000000 HC RX 637 (ALT 250 FOR IP)

## 2024-10-20 RX ORDER — ACETAMINOPHEN 500 MG
1000 TABLET ORAL ONCE
Status: COMPLETED | OUTPATIENT
Start: 2024-10-20 | End: 2024-10-20

## 2024-10-20 RX ORDER — ORPHENADRINE CITRATE 100 MG/1
100 TABLET ORAL 2 TIMES DAILY
Qty: 20 TABLET | Refills: 0 | Status: SHIPPED | OUTPATIENT
Start: 2024-10-20 | End: 2024-10-30

## 2024-10-20 RX ORDER — LIDOCAINE 50 MG/G
1 PATCH TOPICAL DAILY
Qty: 10 PATCH | Refills: 0 | Status: SHIPPED | OUTPATIENT
Start: 2024-10-20 | End: 2024-10-30

## 2024-10-20 RX ADMIN — ACETAMINOPHEN 1000 MG: 500 TABLET ORAL at 00:52

## 2024-10-20 ASSESSMENT — PAIN SCALES - GENERAL: PAINLEVEL_OUTOF10: 10

## 2024-10-20 NOTE — ED PROVIDER NOTES
Stability: Low Risk  (8/28/2024)    Housing Stability Vital Sign     Unable to Pay for Housing in the Last Year: No     Number of Times Moved in the Last Year: 1     Homeless in the Last Year: No       Family History   Problem Relation Age of Onset    Diabetes Father     Heart Failure Father        Allergies:  Adhesive tape, Metformin and related, Ace inhibitors, Iv dye [iodides], Nsaids, Metformin and related, and Silicone    Home Medications:  Prior to Admission medications    Medication Sig Start Date End Date Taking? Authorizing Provider   orphenadrine (NORFLEX) 100 MG extended release tablet Take 1 tablet by mouth 2 times daily for 10 days 10/20/24 10/30/24 Yes Polina Kee MD   lidocaine (LIDODERM) 5 % Place 1 patch onto the skin daily for 10 days 12 hours on, 12 hours off. 10/20/24 10/30/24 Yes Polina Kee MD   insulin glargine (BASAGLAR KWIKPEN) 100 UNIT/ML injection pen INJECT 72 UNITS SUBCUTANEOUSLY IN THE MORNING AND 42 UNITS AT BEDTIME 10/14/24   Zulma Payne APRN - CNP   busPIRone (BUSPAR) 10 MG tablet TAKE 1 TABLET BY MOUTH THREE TIMES DAILY 9/27/24   Zulma Payne APRN - CNP   HYDROcodone-acetaminophen (NORCO) 5-325 MG per tablet Take 1 tablet by mouth every 8 hours as needed for Pain for up to 30 days. Max Daily Amount: 3 tablets 9/25/24 10/25/24  Zulma Payne APRN - CNP   aspirin (EQ ASPIRIN LOW DOSE) 81 MG chewable tablet CHEW AND SWALLOW 1 TABLET BY MOUTH ONCE DAILY 9/23/24   Zulma Payne APRN - CNP   sodium bicarbonate 650 MG tablet TAKE 2 TABLETS BY MOUTH THREE TIMES DAILY 9/18/24   Zulma Payne APRN - CNP   Continuous Glucose  (DEXCOM G6 ) KODY USE TO TEST BLOOD SUGAR 4 TIMES DAILY 9/14/24   Zulma Payne APRN - CNP   diclofenac sodium (VOLTAREN) 1 % GEL Apply 4 g topically 4 times daily 9/10/24 10/10/24  Zulma Payne APRN - CNP   Misc. Devices (HEAT THERAPY) MISC T/Pump Heat therapy pump for 15-20 minutes every 2 hours 9/10/24   Zulma Payne

## 2024-10-20 NOTE — DISCHARGE INSTRUCTIONS
You were seen in the emergency room for back spasms.  Blood work showed no significant abnormalities at this time.  You were treated with Tylenol, gabapentin, lidocaine patch muscle relaxers and Roxicodone while in the emergency room.  You are being discharged with more lidocaine patches and Norflex which is a muscle relaxer to be taken as needed for your muscle spasm.  Please follow-up with your primary care physician for reevaluation and further recommendations to help manage or prevent your muscle spasms from occurring.  Please return to the emergency if you experience any chest pain, shortness of breath any nausea or vomiting or any loss of bowel or bladder control or any new symptoms of concern concern

## 2024-10-22 ENCOUNTER — APPOINTMENT (OUTPATIENT)
Dept: OCCUPATIONAL THERAPY | Age: 64
End: 2024-10-22
Payer: COMMERCIAL

## 2024-10-22 ENCOUNTER — APPOINTMENT (OUTPATIENT)
Dept: PHYSICAL THERAPY | Age: 64
End: 2024-10-22
Payer: COMMERCIAL

## 2024-10-24 ENCOUNTER — APPOINTMENT (OUTPATIENT)
Dept: OCCUPATIONAL THERAPY | Age: 64
End: 2024-10-24
Payer: COMMERCIAL

## 2024-10-24 ENCOUNTER — APPOINTMENT (OUTPATIENT)
Dept: PHYSICAL THERAPY | Age: 64
End: 2024-10-24
Payer: COMMERCIAL

## 2024-10-24 ENCOUNTER — HOSPITAL ENCOUNTER (EMERGENCY)
Age: 64
Discharge: HOME | End: 2024-10-24
Attending: EMERGENCY MEDICINE
Payer: COMMERCIAL

## 2024-10-24 ENCOUNTER — HOSPITAL ENCOUNTER (OUTPATIENT)
Dept: PHYSICAL THERAPY | Age: 64
Setting detail: THERAPIES SERIES
Discharge: HOME | End: 2024-10-24
Payer: COMMERCIAL

## 2024-10-24 ENCOUNTER — HOSPITAL ENCOUNTER (OUTPATIENT)
Dept: OCCUPATIONAL THERAPY | Age: 64
Setting detail: THERAPIES SERIES
Discharge: HOME | End: 2024-10-24
Payer: COMMERCIAL

## 2024-10-24 ENCOUNTER — APPOINTMENT (OUTPATIENT)
Dept: CT IMAGING | Age: 64
End: 2024-10-24
Payer: COMMERCIAL

## 2024-10-24 VITALS
WEIGHT: 242 LBS | SYSTOLIC BLOOD PRESSURE: 119 MMHG | HEART RATE: 74 BPM | RESPIRATION RATE: 18 BRPM | BODY MASS INDEX: 40.32 KG/M2 | DIASTOLIC BLOOD PRESSURE: 96 MMHG | TEMPERATURE: 97.9 F | HEIGHT: 65 IN | OXYGEN SATURATION: 90 %

## 2024-10-24 DIAGNOSIS — M62.830 BACK SPASM: Primary | ICD-10-CM

## 2024-10-24 DIAGNOSIS — Z76.5 MALINGERING: ICD-10-CM

## 2024-10-24 PROCEDURE — 97110 THERAPEUTIC EXERCISES: CPT

## 2024-10-24 PROCEDURE — 97140 MANUAL THERAPY 1/> REGIONS: CPT

## 2024-10-24 PROCEDURE — 72128 CT CHEST SPINE W/O DYE: CPT

## 2024-10-24 PROCEDURE — 93005 ELECTROCARDIOGRAM TRACING: CPT | Performed by: EMERGENCY MEDICINE

## 2024-10-24 PROCEDURE — 72125 CT NECK SPINE W/O DYE: CPT

## 2024-10-24 PROCEDURE — 6360000002 HC RX W HCPCS

## 2024-10-24 PROCEDURE — 70450 CT HEAD/BRAIN W/O DYE: CPT

## 2024-10-24 PROCEDURE — 71250 CT THORAX DX C-: CPT

## 2024-10-24 PROCEDURE — 96372 THER/PROPH/DIAG INJ SC/IM: CPT

## 2024-10-24 PROCEDURE — 6370000000 HC RX 637 (ALT 250 FOR IP): Performed by: EMERGENCY MEDICINE

## 2024-10-24 PROCEDURE — 6370000000 HC RX 637 (ALT 250 FOR IP)

## 2024-10-24 PROCEDURE — 72131 CT LUMBAR SPINE W/O DYE: CPT

## 2024-10-24 PROCEDURE — 99284 EMERGENCY DEPT VISIT MOD MDM: CPT

## 2024-10-24 RX ORDER — LIDOCAINE 4 G/G
1 PATCH TOPICAL ONCE
Status: DISCONTINUED | OUTPATIENT
Start: 2024-10-24 | End: 2024-10-24 | Stop reason: HOSPADM

## 2024-10-24 RX ORDER — ORPHENADRINE CITRATE 30 MG/ML
60 INJECTION INTRAMUSCULAR; INTRAVENOUS ONCE
Status: COMPLETED | OUTPATIENT
Start: 2024-10-24 | End: 2024-10-24

## 2024-10-24 RX ORDER — DIAZEPAM 5 MG/1
5 TABLET ORAL ONCE
Status: COMPLETED | OUTPATIENT
Start: 2024-10-24 | End: 2024-10-24

## 2024-10-24 RX ADMIN — ORPHENADRINE CITRATE 60 MG: 60 INJECTION INTRAMUSCULAR; INTRAVENOUS at 16:47

## 2024-10-24 RX ADMIN — DICLOFENAC SODIUM 4 G: 10 GEL TOPICAL at 17:35

## 2024-10-24 RX ADMIN — DIAZEPAM 5 MG: 5 TABLET ORAL at 16:47

## 2024-10-24 ASSESSMENT — PAIN DESCRIPTION - DESCRIPTORS
DESCRIPTORS: SHARP;SHOOTING
DESCRIPTORS: NUMBNESS

## 2024-10-24 ASSESSMENT — PAIN DESCRIPTION - LOCATION
LOCATION: HAND
LOCATION: BACK

## 2024-10-24 ASSESSMENT — PAIN DESCRIPTION - ORIENTATION
ORIENTATION: RIGHT;UPPER
ORIENTATION: LEFT;RIGHT

## 2024-10-24 ASSESSMENT — PAIN SCALES - GENERAL: PAINLEVEL_OUTOF10: 10

## 2024-10-24 ASSESSMENT — PAIN - FUNCTIONAL ASSESSMENT: PAIN_FUNCTIONAL_ASSESSMENT: 0-10

## 2024-10-24 NOTE — DISCHARGE INSTRUCTIONS
You were seen in the ER today for concern of back spasm.  We gave you multiple medications including muscle relaxers to help your pain.  Your pain was improved.  Unfortunately you had a fall out of bed while you are in the ER.  We obtained scans.  These were negative, it did show air in your left eye which is consistent with your recent macular surgery.  Please return to the ER for any worsening symptoms or concerns.

## 2024-10-24 NOTE — ED NOTES
Pt screaming so loud can be heard throughout department, pt thrashing around in bed. Pt asking for IV pain medication

## 2024-10-24 NOTE — FLOWSHEET NOTE
[facility] Outpatient Rehabilitation and Therapy  Occupational Therapy  [address]    [place], [place] [address]  Phone: [phone]  Fax: [phone]     Occupational Therapy Daily Treatment note     Occupational Therapy: Daily Note   Patient: Estefany Garcia (64 y.o. female)   Examination Date: 10/24/2024  No data recorded  Progress Note Counter: OT Eval & treat , to work on rt wrist.Modalities as needed. 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 2-3x/wk for 4-6 weeks  .     :  1960 # of Visits since SOC:   2   MRN: [id number]  CSN: [id number] Start of Care Date: 10/01/2024   Insurance: Payor: [employer] / Plan: [employer] / Product Type: *No Product type* /   Insurance ID: [id number] - (Medicare Managed) Secondary Insurance (if applicable):    Insurance Information: [employer] .Authorization required after OT evaluation. Pt approved 20 visits OT from 10/1/2024-2024 (therapeutic exercise 98486, manual therapy 27421, therapeutic activities  [id number].   Referring Physician: Taj Avalos PA-C Scott, Logan J, PA-C   PCP: Zulma Payne APRN - CNP Visits to Date/Visits Approved:     No Show/Cancelled Appts:   /       Medical Diagnosis: Other closed fracture of distal end of right radius with routine healing, subsequent encounter [S52.591D] Diagnosis  S52.591D (ICD-10-CM) - Other closed fracture of distal end of right radius with routine healing, subsequent encounter  Treatment Diagnosis: Rt hand/wrist  stiffness, weakness, impaired coordination, rt wrist pain ,swelling rt wrist/MCP area rt hand: ICD-10 code ( M25.631,M25.641, M62.81, R27.9, M25.531,M25.431, M25.441)        SUBJECTIVE EXAMINATION   Pain Level: Pain Screening  Patient Currently in Pain: No  Pain Assessment: Correa-Florse FACES  Post Treatment Pain Level: 10 (back pain spasm during therapy.)  Pain Location: Hand  Pain Orientation: Left, Right  Pain Descriptors: Numbness    Patient

## 2024-10-24 NOTE — ED NOTES
Writer attempted to call and update patient's sister, got voicemail, told sister Hailey to call us back.

## 2024-10-24 NOTE — ED PROVIDER NOTES
[facility]  [place]gency Department Encounter  Emergency Medicine Resident     Pt Name:Estefany Garcia  MRN: [id number]  Birthdate 1960  Date of evaluation: 10/24/24  PCP:  Zulma Payne APRN - CNP  Note Started: 4:27 PM EDT      CHIEF COMPLAINT       Chief Complaint   Patient presents with    Back Pain     Pt states she was at therapy and started experiencing upper right back spasms.  Pt states she does dialysis Monday, Wednesday, and Friday.  Pt states she is a diabetic.       HISTORY OF PRESENT ILLNESS  (Location/Symptom, Timing/Onset, Context/Setting, Quality, Duration, Modifying Factors, Severity.)      Estefany Garcia is a 64 y.o. female who presents with concern for back spasm.  Patient states that it is in the center of her back.  She states that it feels like a tightening muscle sensation.  She states that it started at therapy today.  Patient states that she has had this before.  She states that she was seen here in the ER recently for similar complaint.  Of note, patient is on dialysis Monday Wednesday Friday.  She had a full session yesterday which was uneventful.  She currently denies any chest pain or shortness of breath.  Denies any abdominal pain, nausea or vomiting.  On arrival to the ER, patient is yelling out from her room, screaming \"help me\" and asking staff members to massage her back.    PAST MEDICAL / SURGICAL / SOCIAL / FAMILY HISTORY      has a past medical history of Arthritis, Backache, unspecified, CAD (coronary artery disease), Cerebral artery occlusion with cerebral infarction ([facility]), CHF (congestive heart failure) ([facility]), Chronic kidney disease, Coronary atherosclerosis of artery bypass graft, COVID, Cramp of limb, Epistaxis, Gallstones, Hemodialysis patient ([facility]), Hyperlipidemia, Hypertension, Insomnia, Neuromuscular disorder ([facility]), Pneumonia, Psychiatric problem, Thyroid disease, Type II or unspecified type diabetes mellitus with renal manifestations, not stated as

## 2024-10-24 NOTE — ED NOTES
Pt found on floor with both side rails up of stretcher up, pt states she fell out of bed after scooting herself to the end, pt AOX4, pt able to stand with assistance after c-collar placed. Pt immediately asking for more pain medication after getting back into bed. Pt continuing to ask why she cannot receive morphine.

## 2024-10-24 NOTE — FLOWSHEET NOTE
[facility]   Outpatient Rehabilitation & Therapy  [address]  P: [phone]   F: [phone]    Physical Therapy Daily Treatment Note      Date:  10/24/2024  Patient Name:  Estefany Garcia    :  1960  MRN: [id number]  Physician:      Andre Chu MD                            Insurance: [facility] 10/3-12/3/24 10 visits approved  Medical Diagnosis: M54.50 (ICD-10-CM) - Low back pain, unspecified       Rehab Codes: R25 pain , M25.60 stiffness, R53.1 weakness   Onset Date: 24               Next 's appt: 10/24/24- Ortho  Visit# / total visits:  (1/10 approved)  Cancels/No Shows: 0/0    Precautions: HX of MI, Lumbar fusion, polyneuropathy, Dialysis (M,W,F)     Subjective:  Patient reports she feels like her neuropathy is worsening in her hands.  Patient reporting she feels more tired than usual.  Patient reporting she had retinal surgery 2wks ago Tuesday.  Patient reporting she was in the ED on  for back spasms and went home around midnight after getting pain and meds and mm relaxer.  Patient reports she tries to walk with her 4WW.      Pain:  [x] Yes  [] No   Feet and hands 5/10  LBP denies/10  Neck 8/10  Pain altered Tx:  [] No  [] Yes  Action:  Comments:    Objective:  Modalities:   Precautions:  Exercises:  INTERVENTIONS  Reps/ Time Weight/ Level Completed  Today Comments               MODALITIES                                    MANUAL             MFR to B UT 4'/4'   x                 EXERCISES             UT stretch 3x 30\"   x      Levator Stretch 3x30\"   x      abdominal bracing 10x 5\"   x     Scapular retraction + abdominal bracing 10x2 3\" x    Scapular retraction + Retro shoulder rolls 10x 2  x    Thoracic extension over towel roll 10x 5\"  x       Patent Education/Home program:   -Discussed POC and goals for PT.     Specific Instructions for next treatment:   -STM to cervical musculature  -LE strengthening  - core stabilization   -Standing

## 2024-10-24 NOTE — ED NOTES
Pt still screaming loudly, can be heard throughout department, Voltaren topical applied to pt back, pt continuing to roll around bed, asking for IV pain medication

## 2024-10-25 LAB
EKG ATRIAL RATE: 87 BPM
EKG P AXIS: 54 DEGREES
EKG P-R INTERVAL: 158 MS
EKG Q-T INTERVAL: 410 MS
EKG QRS DURATION: 104 MS
EKG QTC CALCULATION (BAZETT): 493 MS
EKG R AXIS: 30 DEGREES
EKG T AXIS: -55 DEGREES
EKG VENTRICULAR RATE: 87 BPM

## 2024-10-25 NOTE — PROGRESS NOTES
Pt. Had glucose monitoring device placed on abdomen. Due to device placed in location unable to shield tech asked pt. if she is able to remove the device for exam due to radiation potentially causing false readings within the device. Pt. Stated she is able to remove the device and took glucose monitor off of abdomen, glucose monitor placed in sharps container.  AE

## 2024-10-25 NOTE — ED NOTES
Writer was informed by Raquel AGUAYO that pt reports she is missing her dexcom monitor. She reports it was removed during CT and she never got it back. Writer reached out to CT inquiring about the monitor. It was reported that pt removed it herself telling CT staff that she didn't need it. It was then disposed of in sharps container.     Writer went in to speak with family. When pt and family were notified that it was removed by the pt, pt denied stating that CT removed it and she informed them that she needed it. Pt sisters report that they will contact insurance. No other needs expressed at this time.

## 2024-10-29 ENCOUNTER — HOSPITAL ENCOUNTER (OUTPATIENT)
Dept: PHYSICAL THERAPY | Age: 64
Setting detail: THERAPIES SERIES
Discharge: HOME | End: 2024-10-29
Payer: COMMERCIAL

## 2024-10-29 ENCOUNTER — APPOINTMENT (OUTPATIENT)
Dept: PHYSICAL THERAPY | Age: 64
End: 2024-10-29
Payer: COMMERCIAL

## 2024-10-29 ENCOUNTER — APPOINTMENT (OUTPATIENT)
Dept: OCCUPATIONAL THERAPY | Age: 64
End: 2024-10-29
Payer: COMMERCIAL

## 2024-10-29 ENCOUNTER — HOSPITAL ENCOUNTER (OUTPATIENT)
Dept: OCCUPATIONAL THERAPY | Age: 64
Setting detail: THERAPIES SERIES
Discharge: HOME | End: 2024-10-29
Payer: COMMERCIAL

## 2024-10-29 PROCEDURE — 97110 THERAPEUTIC EXERCISES: CPT

## 2024-10-29 PROCEDURE — 97140 MANUAL THERAPY 1/> REGIONS: CPT

## 2024-10-29 ASSESSMENT — PAIN DESCRIPTION - PAIN TYPE: TYPE: ACUTE PAIN

## 2024-10-29 ASSESSMENT — PAIN DESCRIPTION - ORIENTATION: ORIENTATION: RIGHT

## 2024-10-29 ASSESSMENT — PAIN SCALES - GENERAL: PAINLEVEL_OUTOF10: 9

## 2024-10-29 ASSESSMENT — PAIN DESCRIPTION - LOCATION: LOCATION: BACK

## 2024-10-29 ASSESSMENT — PAIN DESCRIPTION - DESCRIPTORS: DESCRIPTORS: ACHING

## 2024-10-29 NOTE — FLOWSHEET NOTE
[facility] Outpatient Rehabilitation and Therapy  Occupational Therapy  [address]    [place], [place] [address]  Phone: [phone]  Fax: [phone]     Occupational Therapy Daily Treatment note     Occupational Therapy: Daily Note   Patient: Estefany Garcia (64 y.o. female)   Examination Date: 10/29/2024  No data recorded  Progress Note Counter: OT Eval & treat , to work on rt wrist.Modalities as needed. 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 2-3x/wk for 4-6 weeks  .     :  1960 # of Visits since SOC:   3   MRN: [id number]  CSN: [id number] Start of Care Date: 10/01/2024   Insurance: Payor: [employer]EF[employer] / Plan: [employer] D[employer] / Product Type: *No Product type* /   Insurance ID: [id number] - (Medicare Managed) Secondary Insurance (if applicable):    Insurance Information: [employer] .Authorization required after OT evaluation. Pt approved 20 visits OT from 10/1/2024-2024 (therapeutic exercise 54887, manual therapy 44882, therapeutic activities  74410.   Referring Physician: Taj Avalos PA-C Scott, Logan J, PA-C   PCP: Zulma Payne APRN - CNP Visits to Date/Visits Approved: 3 / 21    No Show/Cancelled Appts:   /       Medical Diagnosis: Other closed fracture of distal end of right radius with routine healing, subsequent encounter [S52.591D] Diagnosis  S52.591D (ICD-10-CM) - Other closed fracture of distal end of right radius with routine healing, subsequent encounter  Treatment Diagnosis: Rt hand/wrist  stiffness, weakness, impaired coordination, rt wrist pain ,swelling rt wrist/MCP area rt hand: ICD-10 code ( M25.631,M25.641, M62.81, R27.9, M25.531,M25.431, M25.441)        SUBJECTIVE EXAMINATION   Pain Level: Pain Screening  Patient Currently in Pain: Yes  Pain Assessment: 0-10  Pain Level: 9  Post Treatment Pain Level: 9  Pain Type: Acute pain  Pain Location: Back (rt shoulder blade)  Pain Orientation: Right  Pain Descriptors:

## 2024-10-29 NOTE — FLOWSHEET NOTE
-Standing balance  -Assess LE muscle length.      Assessment: [] Progressing toward goals.    [x] No change.  Patient is very slow moving and reporting high pain this session limiting progressions with exercises.  Patient verbalizes understanding of HEP but continues to require cues to correct posture and technique throughout the session.  Educated the patient on the importance of consistency with her HEP to be able to improve her tolerance to mobility to be able to progress in therapy.  Patient reporting she has been compliant with pain medication however she seems more lethargic during session presenting with eyes closed ~75-80% of the session.  Added physioball roll outs to improve lumbar and thoracic mobility.  Pt requested a massage 2x during session to which she was educated on strengthening to improve dorsal strength.       [] Other:     [x] Patient would continue to benefit from skilled physical therapy services in order to: decrease pain, improve strength, and increase function with mobility.     STG: (to be met in 8 treatments)  Pt will self report worst pain no greater than 5/10 in order to better tolerate ADLs/work activities with minimal dysfunction  Pt will self report increased standing tolerance for greater than 10 minutes in order to improve ease of ADLs  Pt will be able to walk 100ft with rolator and CGA without needing a seated rest break in order to improve mobility in the home.     LTG: (to be met in 16 treatments)  Pt will demonstrate improved B LE strength to 4+ or greater in order to demonstrate improved stability/strength necessary for unrestricted ADLs/work activities  Pt will decrease Mod JP to at least 18% in order to demonstrate improved functional tolerances at PLOF with minimal restriction/dysfunction  Pt will decrease TUG time to </=14 seconds to no longer be classified as a fall risk for safer ambulation around the home  Pt will increase Tinetti score to at least 27 to no longer

## 2024-10-31 ENCOUNTER — HOSPITAL ENCOUNTER (OUTPATIENT)
Dept: PHYSICAL THERAPY | Age: 64
Setting detail: THERAPIES SERIES
Discharge: HOME | End: 2024-10-31
Payer: COMMERCIAL

## 2024-10-31 ENCOUNTER — APPOINTMENT (OUTPATIENT)
Dept: PHYSICAL THERAPY | Age: 64
End: 2024-10-31
Payer: COMMERCIAL

## 2024-10-31 ENCOUNTER — APPOINTMENT (OUTPATIENT)
Dept: OCCUPATIONAL THERAPY | Age: 64
End: 2024-10-31
Payer: COMMERCIAL

## 2024-10-31 ENCOUNTER — HOSPITAL ENCOUNTER (OUTPATIENT)
Dept: OCCUPATIONAL THERAPY | Age: 64
Setting detail: THERAPIES SERIES
Discharge: HOME | End: 2024-10-31
Payer: COMMERCIAL

## 2024-10-31 PROCEDURE — 97140 MANUAL THERAPY 1/> REGIONS: CPT

## 2024-10-31 PROCEDURE — 97110 THERAPEUTIC EXERCISES: CPT

## 2024-10-31 ASSESSMENT — PAIN DESCRIPTION - DESCRIPTORS: DESCRIPTORS: ACHING;SPASM

## 2024-10-31 ASSESSMENT — PAIN DESCRIPTION - PAIN TYPE: TYPE: ACUTE PAIN

## 2024-10-31 ASSESSMENT — PAIN DESCRIPTION - LOCATION: LOCATION: BACK

## 2024-10-31 ASSESSMENT — PAIN DESCRIPTION - ORIENTATION: ORIENTATION: LEFT;RIGHT

## 2024-10-31 ASSESSMENT — PAIN SCALES - GENERAL: PAINLEVEL_OUTOF10: 9

## 2024-10-31 NOTE — FLOWSHEET NOTE
[facility]  Occupational Therapy  [address]    [place], [place] [address]  Phone: [phone]  Fax: [phone]     Occupational Therapy Daily Treatment note     Occupational Therapy: Daily Note   Patient: Estefany Garcia (64 y.o. female)   Examination Date: 10/31/2024  No data recorded  Progress Note Counter: OT Eval & treat , to work on rt wrist.Modalities as needed. 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 2-3x/wk for 4-6 weeks  .     :  1960 # of Visits since SOC:   4   MRN: [id number]  CSN: [id number] Start of Care Date: 10/1/2024   Insurance: Payor: [employer] / Plan: [employer] / Product Type: *No Product type* /   Insurance ID: [id number] - (Medicare Managed) Secondary Insurance (if applicable):    Insurance Information: [employer] .Authorization required after OT evaluation. Pt approved 20 visits OT from 10/1/2024-2024 (therapeutic exercise 61678, manual therapy 61130, therapeutic activities  40306.   Referring Physician: Taj Avalos PA-C Scott, Logan J, PA-C   PCP: Zulma Payne APRN - CNP Visits to Date/Visits Approved:     No Show/Cancelled Appts:   /       Medical Diagnosis: Other closed fracture of distal end of right radius with routine healing, subsequent encounter [S52.591D] Diagnosis  S52.591D (ICD-10-CM) - Other closed fracture of distal end of right radius with routine healing, subsequent encounter  Treatment Diagnosis: Rt hand/wrist  stiffness, weakness, impaired coordination, rt wrist pain ,swelling rt wrist/MCP area rt hand: ICD-10 code ( M25.631,M25.641, M62.81, R27.9, M25.531,M25.431, M25.441)        SUBJECTIVE EXAMINATION   Pain Level: Pain Screening  Patient Currently in Pain: Yes  Pain Assessment: 0-10  Pain Level: 9 (pain 8-9)  Post Treatment Pain Level: 9 (pain 8-9)  Pain Type: Acute pain  Pain Location: Back  Pain Orientation: Left, Right  Pain Descriptors: Aching,

## 2024-10-31 NOTE — FLOWSHEET NOTE
community.  Pt will self report no falls for 8 weeks in order to remain safe in the home.  Pt will demonstrate independence with a long term HEP for continued progress/maintenance after completion of PT    Pt. Education:  [x] Yes  [] No  [] Reviewed Prior HEP/Ed  Method of Education: [x] Verbal  [x] Demo  [] Written  Access Code: [email or web address]  URL: [email or web address]  Date: 10/24/2024  Prepared by: Isi Cobos    Exercises  - Seated Transversus Abdominis Bracing  - 2-3 x daily - 7 x weekly - 2-3 sets - 10 reps - 3-5sec hold  - Seated Scapular Retraction  - 2-3 x daily - 7 x weekly - 2-3 sets - 10 reps - 3-5sec hold  - Seated Upper Trapezius Stretch  - 2-3 x daily - 7 x weekly - 3-5 reps - 15-30sec hold  - Gentle Levator Scapulae Stretch  - 2-3 x daily - 7 x weekly - 3-5 reps - 15-30sec hold  - Shoulder Rolls in Sitting  - 2-3 x daily - 7 x weekly - 3 sets - 10 reps    Comprehension of Education:  [x] Verbalizes understanding.  [x] Demonstrates understanding.  [] Needs review.  [] Demonstrates/verbalizes HEP/Ed previously given.     Plan: [x] Continue per plan of care.   [] Other:      Treatment Charges: Mins Units Time in/Out   []  Modalities      [x]  Ther Exercise 38 3 1:47-2:30 PM   []  Manual Therapy      []  Ther Activities      []  Aquatics      []  Neuromuscular      [] Vasocompression      [] Gait Training      [] Dry needling        [] 1 or 2 muscles        [] 3 or more muscles      []  Other      Total Billable time 38 3    -5' un-timed for rest breaks   -Education billed under there ex     Time In: 1:47 PM            Time Out: 2:30 PM    Electronically signed by:  CARMELA MATAMOROS PTA

## 2024-11-05 ENCOUNTER — HOSPITAL ENCOUNTER (OUTPATIENT)
Dept: OCCUPATIONAL THERAPY | Age: 64
Setting detail: THERAPIES SERIES
Discharge: HOME | End: 2024-11-05
Payer: COMMERCIAL

## 2024-11-05 ENCOUNTER — APPOINTMENT (OUTPATIENT)
Dept: PHYSICAL THERAPY | Age: 64
End: 2024-11-05
Payer: COMMERCIAL

## 2024-11-05 ENCOUNTER — HOSPITAL ENCOUNTER (OUTPATIENT)
Dept: PHYSICAL THERAPY | Age: 64
Setting detail: THERAPIES SERIES
Discharge: HOME | End: 2024-11-05
Payer: COMMERCIAL

## 2024-11-05 ENCOUNTER — APPOINTMENT (OUTPATIENT)
Dept: OCCUPATIONAL THERAPY | Age: 64
End: 2024-11-05
Payer: COMMERCIAL

## 2024-11-05 PROCEDURE — 97110 THERAPEUTIC EXERCISES: CPT

## 2024-11-05 PROCEDURE — 97140 MANUAL THERAPY 1/> REGIONS: CPT

## 2024-11-05 ASSESSMENT — PAIN SCALES - GENERAL: PAINLEVEL_OUTOF10: 0

## 2024-11-05 NOTE — FLOWSHEET NOTE
home.     LTG: (to be met in 16 treatments)  Pt will demonstrate improved B LE strength to 4+ or greater in order to demonstrate improved stability/strength necessary for unrestricted ADLs/work activities  Pt will decrease Mod JP to at least 18% in order to demonstrate improved functional tolerances at PLOF with minimal restriction/dysfunction  Pt will decrease TUG time to </=14 seconds to no longer be classified as a fall risk for safer ambulation around the home  Pt will increase Tinetti score to at least 27 to no longer be classified as a fall risk for safer mobility around the home and community.  Pt will self report no falls for 8 weeks in order to remain safe in the home.  Pt will demonstrate independence with a long term HEP for continued progress/maintenance after completion of PT    Pt. Education:  [x] Yes  [] No  [] Reviewed Prior HEP/Ed  Method of Education: [x] Verbal  [x] Demo  [] Written  Access Code: [email or web address]  URL: [email or web address]  Date: 10/24/2024  Prepared by: Isi Cobos    Exercises  - Seated Transversus Abdominis Bracing  - 2-3 x daily - 7 x weekly - 2-3 sets - 10 reps - 3-5sec hold  - Seated Scapular Retraction  - 2-3 x daily - 7 x weekly - 2-3 sets - 10 reps - 3-5sec hold  - Seated Upper Trapezius Stretch  - 2-3 x daily - 7 x weekly - 3-5 reps - 15-30sec hold  - Gentle Levator Scapulae Stretch  - 2-3 x daily - 7 x weekly - 3-5 reps - 15-30sec hold  - Shoulder Rolls in Sitting  - 2-3 x daily - 7 x weekly - 3 sets - 10 reps    11/5/24  - Seated Hamstring Stretch with Chair  - 2-3 x daily - 7 x weekly - 3-5 reps - 15-30sec hold  - Seated Calf Stretch with Towel  - 2-3 x daily - 7 x weekly - 3-5 reps - 15-30sec hold  - Seated Heel Toe Raises  - 2-3 x daily - 7 x weekly - 1-3 sets - 10 reps  - SLOW Marching  - 2-3 x daily - 7 x weekly - 1-3 sets - 10 reps  - Standing Knee Flexion AROM with Chair Support  - 2-3 x daily - 7 x weekly - 1-3 sets - 10 reps  - Mini Squat w/ Counter

## 2024-11-05 NOTE — FLOWSHEET NOTE
clothespins for rt hand strengthening.                     Patient Education:   OT educated pt on correct techniques for using rt hand for juxaciser, BAPs, & graded clothespin exercises. Pt correctly demo exercises.       ASSESSMENT     Assessment: Assessment: Pt making progress with no rt hand/wrist pain. No pain behaviors or back pain or spasms during tx. OT tx focus on rt wrist/hand PROM, AROM, coordination, & strengthening exercises.Edema massage to decrease swelling in pt's rt hand.Pt reports pulling radial side rt wrist when doing AROM wrist RD/UD.Upgraded pt's therapy program to include juxaciser, BAPs for rt wrist/hand rom to simulate uncrewing /screwing lids, & graded clothespin manipulation (grasp/pinch ) for rt hand strengthening.See OT exercises for detail. Pt will benefit from skilled Occupational Therapy to provide manual therapy (52150), therapeutic exercises (03317) & therapeutic activities (74451),modailities as needed to decrease swelling rt hand/wrist, increase rt hand/wrist/forear AROM, coordination, rt hand/wrist strength,decrease pain to promote increase independence & less difficulty with pt using rt hand /wrist for ADL/light IADLs.  Performance deficits / Impairments: Decreased high-level IADLs, Decreased strength, Decreased sensation, Decreased fine motor control, Decreased ROM    Post-Treatment Pain Level: 0    Prognosis: Good    Activity Tolerance: Patient tolerated treatment well             GOALS   Patient Goals   Patient goals : To be able to use rt hand to lift things, push down to get out of chair, to retrieve items from the fridge,    Short Term Goals Completed by 10 visits Current Status Goal Status   Pt will demo & report independence with rt hand/wrist HEP       2. Pt will report decrease rt wrist pain to 2 on 0-10 scale when doing exercises & home  activities       3. Increase rt hand strength ( by 5#, pinch (lateral ,willson, tip) by 2#  to improve strength opening

## 2024-11-07 ENCOUNTER — HOSPITAL ENCOUNTER (OUTPATIENT)
Dept: OCCUPATIONAL THERAPY | Age: 64
Setting detail: THERAPIES SERIES
Discharge: HOME | End: 2024-11-07
Payer: COMMERCIAL

## 2024-11-07 ENCOUNTER — APPOINTMENT (OUTPATIENT)
Dept: PHYSICAL THERAPY | Age: 64
End: 2024-11-07
Payer: COMMERCIAL

## 2024-11-07 ENCOUNTER — APPOINTMENT (OUTPATIENT)
Dept: OCCUPATIONAL THERAPY | Age: 64
End: 2024-11-07
Payer: COMMERCIAL

## 2024-11-07 ENCOUNTER — HOSPITAL ENCOUNTER (OUTPATIENT)
Dept: PHYSICAL THERAPY | Age: 64
Setting detail: THERAPIES SERIES
Discharge: HOME | End: 2024-11-07
Payer: COMMERCIAL

## 2024-11-07 PROCEDURE — 97110 THERAPEUTIC EXERCISES: CPT

## 2024-11-07 PROCEDURE — 97140 MANUAL THERAPY 1/> REGIONS: CPT

## 2024-11-07 ASSESSMENT — PAIN DESCRIPTION - ORIENTATION: ORIENTATION: MID

## 2024-11-07 ASSESSMENT — PAIN SCALES - GENERAL: PAINLEVEL_OUTOF10: 8

## 2024-11-07 ASSESSMENT — PAIN DESCRIPTION - DESCRIPTORS: DESCRIPTORS: ACHING

## 2024-11-07 ASSESSMENT — PAIN DESCRIPTION - LOCATION: LOCATION: BACK

## 2024-11-07 ASSESSMENT — PAIN DESCRIPTION - PAIN TYPE: TYPE: ACUTE PAIN

## 2024-11-07 NOTE — FLOWSHEET NOTE
Heel toe raises 20x   x    LAQ 10x 2   x    Hip flexion  10x ea  x    Hip abd 10x ea  x                  Standing       Marching + abdominal bracing 10x2   Spasms noted in legs but states they do not hurt   Heel toe raises    Trialled but had difficulty and regressed to sitting.    Hip abd/ extension 10x      HSC 10x      Mini squats 10x   Made it to 9x before \"tremors\" began      Patent Education/Home program:      Specific Instructions for next treatment:   -LE strengthening  - core stabilization   -Standing balance  -Assess LE muscle length.    Assessment: [] Progressing toward goals.      [x] No change. Treatment began with addressing near fall, collecting vitals and stabilizing patient. After pt was better stabilized, continued with seated exercises focused on LE strengthening. While completing 4 seated exercises, pt had multiple episodes of back spasms, requiring prolonged rest breaks. During pain episodes, pt was heavily educated on deep breathing relaxation techniques. Pt reported that exercise further provoke episodes. Opted to hold remaninig PT treatment due to pain significantly limiting therapeutic benefit. Wheeled PT out to clipkit in  due to pt still reporting dizziness. Waited with pt in [facility] until family member arrived to pick her up to ensue safe transfer into car and monitor symptoms.      [] Other:     [] Patient would continue to benefit from skilled physical therapy services in order to: decrease pain, improve strength, and increase function with mobility.     STG: (to be met in 8 treatments)  Pt will self report worst pain no greater than 5/10 in order to better tolerate ADLs/work activities with minimal dysfunction  Pt will self report increased standing tolerance for greater than 10 minutes in order to improve ease of ADLs  Pt will be able to walk 100ft with rolator and CGA without needing a seated rest break in order to improve mobility in the home.     LTG: (to be met in 16

## 2024-11-07 NOTE — FLOWSHEET NOTE
in total score by 9 points and less difficulty using rt UE for ADLs/light IADLs.               7. Increase AROM rt :forearm supination by 10, wirst (flexion, extension, UD) by 10 & rt hand index & middle finger flexion to DPC by 1 cm  enable pt to complete hair grooming with less difficulty               8. Increase PROM rt :forearm supination by 10, wrist (flexion,extension,UD )by 10                               Long Term Goals Completed by 20 visits Current Status Goal Status   Pt will report decrease rt wrist pain to 1 on 0-10 scale when doing exercises & home activities       2. Compared to eval:Increase rt hand strength ( by 10#, pinch (lateral ,willson, tip) by 4# to improve strength opening jars/containers & cutting food.       3. Pt will report improved rt hand coordination during her daily activities & complete 9 hole peg test 8 seconds quicker than eval using rt hand.       4. Using the UEFI pt will report increase in total score by 18 points and less difficulty using rt UE for ADLs/light IADLs.       5. Decrease swelling girth rt metacarpals by .5cm and decrease swelling girth rt wrist by .5 cm.         6. Compared to eval: Increase AROM rt :forearm supination by 20, wirst (flexion, extension, UD) by 15-20 & rt hand index & middle finger flexion to DPC by 2 cm(make composite fist) enable pt to complete hair grooming with less difficulty                7. Compared to eval:Increase PROM rt wrist (flexion,UD )by 20                                  TREATMENT PLAN   REQUIRES OT FOLLOW-UP: Yes  Type: Outpatient  Plan  Plan Frequency: 2x/week  Plan Weeks: 20 visits  Current Treatment Recommendations: Strengthening, ROM, Coordination training, Patient/Caregiver education & training, Safety education & training  Additional Comments: continue OT       Therapy Time  Individual Time In: 1300  Individual Time Out: 1345  Minutes: 45  Timed Code Treatment Minutes: 45 Minutes       Electronically signed by Irina CHAVES

## 2024-11-12 ENCOUNTER — APPOINTMENT (OUTPATIENT)
Dept: PHYSICAL THERAPY | Age: 64
End: 2024-11-12
Payer: COMMERCIAL

## 2024-11-12 ENCOUNTER — HOSPITAL ENCOUNTER (OUTPATIENT)
Dept: OCCUPATIONAL THERAPY | Age: 64
Setting detail: THERAPIES SERIES
Discharge: HOME | End: 2024-11-12
Payer: COMMERCIAL

## 2024-11-14 ENCOUNTER — APPOINTMENT (OUTPATIENT)
Dept: OCCUPATIONAL THERAPY | Age: 64
End: 2024-11-14
Payer: COMMERCIAL

## 2024-11-14 ENCOUNTER — APPOINTMENT (OUTPATIENT)
Dept: PHYSICAL THERAPY | Age: 64
End: 2024-11-14
Payer: COMMERCIAL

## 2024-11-19 ENCOUNTER — APPOINTMENT (OUTPATIENT)
Dept: PHYSICAL THERAPY | Age: 64
End: 2024-11-19
Payer: COMMERCIAL

## 2024-11-19 ENCOUNTER — APPOINTMENT (OUTPATIENT)
Dept: OCCUPATIONAL THERAPY | Age: 64
End: 2024-11-19
Payer: COMMERCIAL

## 2024-11-21 ENCOUNTER — APPOINTMENT (OUTPATIENT)
Dept: PHYSICAL THERAPY | Age: 64
End: 2024-11-21
Payer: COMMERCIAL

## 2024-11-21 ENCOUNTER — APPOINTMENT (OUTPATIENT)
Dept: OCCUPATIONAL THERAPY | Age: 64
End: 2024-11-21
Payer: COMMERCIAL

## 2024-11-26 ENCOUNTER — APPOINTMENT (OUTPATIENT)
Dept: PHYSICAL THERAPY | Age: 64
End: 2024-11-26
Payer: COMMERCIAL

## 2024-11-26 ENCOUNTER — APPOINTMENT (OUTPATIENT)
Dept: OCCUPATIONAL THERAPY | Age: 64
End: 2024-11-26
Payer: COMMERCIAL

## 2024-12-15 NOTE — PROGRESS NOTES
Hospitalist rounding and patient noted to have 1 tablet and 1 capsule stuck to gown and partially dissolved. No identifying marks on medications and unable to determine what medications are. Care ongoing.    IV removed. Patient was given discharge instructions and verbalized understanding. Patient left with all personal belongings. Patient was taken off the floor by wheelchair.

## (undated) DEVICE — KIT MICRO INTRO 4FR STIFF 40CM NIGHTENALL TUNGSTEN 7SMT

## (undated) DEVICE — SUTURE PERMA-HAND SZ 2-0 L30IN NONABSORBABLE BLK L26MM SH K833H

## (undated) DEVICE — STRAP,POSITIONING,KNEE/BODY,FOAM,4X60": Brand: MEDLINE

## (undated) DEVICE — SHEET, T, LAPAROTOMY, STERILE: Brand: MEDLINE

## (undated) DEVICE — APPLICATOR MEDICATED 10.5 CC SOLUTION HI LT ORNG CHLORAPREP

## (undated) DEVICE — GLOVE SURG SZ 6 CRM LTX FREE POLYISOPRENE POLYMER BEAD ANTI

## (undated) DEVICE — ANGIOGRAPHIC CATHETER: Brand: EXPO™

## (undated) DEVICE — STRYKER PERFORMANCE SERIES SAGITTAL BLADE: Brand: STRYKER PERFORMANCE SERIES

## (undated) DEVICE — TOWEL,OR,DSP,ST,NATURAL,DLX,4/PK,20PK/CS: Brand: MEDLINE

## (undated) DEVICE — SUTURE VCRL + SZ 2-0 L27IN ABSRB CLR CT-1 1/2 CIR TAPERCUT VCP259H

## (undated) DEVICE — CATH BLLN ANGIO 2X12MM SC EUPHORA RX

## (undated) DEVICE — PINNACLE INTRODUCER SHEATH: Brand: PINNACLE

## (undated) DEVICE — CATH BLLN ANGIO 2X15MM NC EUPHORIA RX

## (undated) DEVICE — Device

## (undated) DEVICE — GAUZE,SPONGE,FLUFF,6"X6.75",STRL,5/TRAY: Brand: MEDLINE

## (undated) DEVICE — SUTURE PERMAHAND SZ 4-0 L18IN NONABSORBABLE BLK SILK BRAID A183H

## (undated) DEVICE — GLOVE SURG SZ 7.5 L11.73IN FNGR THK9.8MIL STRW LTX POLYMER

## (undated) DEVICE — CATHETER ANGIOPLSTY OTW 035 6 FRX80 CM 6X60 MM IN.PACT AV

## (undated) DEVICE — RADIFOCUS GLIDEWIRE ADVANTAGE GUIDEWIRE: Brand: GLIDEWIRE ADVANTAGE

## (undated) DEVICE — SOLUTION IRRIG 1000ML STRL H2O USP PLAS POUR BTL

## (undated) DEVICE — APPLICATOR MEDICATED 26 CC SOLUTION HI LT ORNG CHLORAPREP

## (undated) DEVICE — GLOVE SURG SZ 65 CRM LTX FREE POLYISOPRENE POLYMER BEAD ANTI

## (undated) DEVICE — CONTAINER,SPECIMEN,4OZ,OR STRL: Brand: MEDLINE

## (undated) DEVICE — NEEDLE HYPO 25GA L1.5IN BLU POLYPR HUB S STL REG BVL STR

## (undated) DEVICE — TUBING AMB

## (undated) DEVICE — CATH BLLN ANGIO 2.50X15MM SC EUPHORA RX

## (undated) DEVICE — BANDAGE,GAUZE,BULKEE II,4.5"X4.1YD,STRL: Brand: MEDLINE

## (undated) DEVICE — GUIDEWIRE 35/260/FC/PTFE/3J: Brand: GUIDEWIRE

## (undated) DEVICE — MICROPUNCTURE INTRODUCER SET SILHOUETTE TRANSITIONLESS WITH STAINLESS STEEL WIRE GUIDE: Brand: MICROPUNCTURE

## (undated) DEVICE — SET PEN AND LBL AND L BWL LBL PAL PEN AND LBLS PAL700]

## (undated) DEVICE — CATHETER GUID 6FR L100CM GRN PTFE JR4 TRUELUMEN W/ 2 SIDE H

## (undated) DEVICE — Device: Brand: PROWATERFLEX

## (undated) DEVICE — CATHETER GUID 6FR L150CM RAP EXCHG L25CM TIP 5.1FR PUSHROD

## (undated) DEVICE — CONTAINER SPEC 33OZ URIN COLL BIODEGRADABLE PAPER DISP

## (undated) DEVICE — CATH BLLN ANGIO 1.50X15MM SC EUPHORA RX

## (undated) DEVICE — DRESSING TRNSPAR W2XL2.75IN FLM SHT SEMIPERMEABLE WIND

## (undated) DEVICE — SUTURE NONABSORBABLE MONOFILAMENT 7-0 BV-1 1X24 IN PROLENE 8702H

## (undated) DEVICE — GLOVE SURG SZ 7 L12IN FNGR THK79MIL GRN LTX FREE

## (undated) DEVICE — THE STERILE LIGHT HANDLE COVER IS USED WITH STERIS SURGICAL LIGHTING AND VISUALIZATION SYSTEMS.

## (undated) DEVICE — DEVICE INFL 20ML 30ATM DGT FLD DISPNS SYR W ACCESSPLUS BLU

## (undated) DEVICE — INTRODUCER SHTH 6FR L11CM 0.038IN STD SIDEPRT EXTN 3 W

## (undated) DEVICE — IV EXT SET,60",2.0ML, M/F LUERLOCK,CLAMP: Brand: MEDLINE

## (undated) DEVICE — SURGICAL PROCEDURE TRAY CRD CATH SVMMC

## (undated) DEVICE — GLOVE SURG SZ 8 L12IN FNGR THK79MIL GRN LTX FREE

## (undated) DEVICE — SYRINGE 20ML LL S/C 50

## (undated) DEVICE — GLOVE SURG SZ 7 CRM LTX FREE POLYISOPRENE POLYMER BEAD ANTI

## (undated) DEVICE — GOWN,AURORA,NONREINFORCED,LARGE: Brand: MEDLINE

## (undated) DEVICE — COVER,LIGHT HANDLE,FLX,2/PK: Brand: MEDLINE INDUSTRIES, INC.

## (undated) DEVICE — EVERGRIP INSERT SET: Brand: FOGARTY EVERGRIP

## (undated) DEVICE — SNAP KAP: Brand: UNBRANDED

## (undated) DEVICE — MERCY HEALTH ST CHARLES: Brand: MEDLINE INDUSTRIES, INC.

## (undated) DEVICE — PROTECTOR ULN NRV PUR FOAM HK LOOP STRP ANATOMICALLY

## (undated) DEVICE — CATH BLLN ANGIO 2.50X15MM NC EUPHORIA RX

## (undated) DEVICE — SUTURE NONABSORBABLE MONOFILAMENT 2-0 FS 18 IN ETHILON 664H

## (undated) DEVICE — PROVE COVER: Brand: UNBRANDED

## (undated) DEVICE — CATHETER ANGIO 5FR L65CM 0.038IN KMP SEL SFT RADPQ TIP HI

## (undated) DEVICE — STRIP,CLOSURE,WOUND,MEDI-STRIP,1/2X4: Brand: MEDLINE

## (undated) DEVICE — DEVICE SURGICAL SWITCH FLOW HIGH PRESSURE M/F LL

## (undated) DEVICE — SUTURE VCRL + SZ 4-0 L27IN ABSRB UD L26MM SH 1/2 CIR VCP415H

## (undated) DEVICE — 3M™ IOBAN™ 2 ANTIMICROBIAL INCISE DRAPE 6650EZ: Brand: IOBAN™ 2

## (undated) DEVICE — INTRODUCER SHTH 0.018 IN 4 FRX40 CM KT SFT TIP NIT VSI 7266V

## (undated) DEVICE — DECANTER BAG 9": Brand: MEDLINE INDUSTRIES, INC.

## (undated) DEVICE — CATH BLLN ANGIO 2X15MM SC EUPHORA RX

## (undated) DEVICE — SYRINGE MED 10ML LUERLOCK TIP W/O SFTY DISP

## (undated) DEVICE — BANDAGE COMPR W6INXL12FT SMOOTH FOR LIMB EXSANG ESMARCH

## (undated) DEVICE — SET EXTN IV L30IN TBNG DIA0.1IN PRIMING 4ML MACBOR FEM ADPT

## (undated) DEVICE — RUNTHROUGH NS EXTENSION WIRE: Brand: RUNTHROUGH

## (undated) DEVICE — BLADE ES ELASTOMERIC COAT INSUL DURABLE BEND UPTO 90DEG

## (undated) DEVICE — GLOVE SURG SZ 85 CRM LTX FREE POLYISOPRENE POLYMER BEAD ANTI

## (undated) DEVICE — Device: Brand: GRAND SLAM

## (undated) DEVICE — GLOVE SURG SZ 75 L12IN FNGR THK79MIL GRN LTX FREE

## (undated) DEVICE — SUTURE NONABSORBABLE MONOFILAMENT 6-0 C-1 1X30 IN PROLENE 8706H

## (undated) DEVICE — GOWN,SIRUS,NONRNF,SETINSLV,XL,20/CS: Brand: MEDLINE

## (undated) DEVICE — CATH BLLN ANGIO 3X15MM NC EUPHORIA RX

## (undated) DEVICE — AGENT HEMSTAT W2XL14IN OXIDIZED REGENERATED CELOS ABSRB FOR

## (undated) DEVICE — DRAPE,UTILITY,XL,4/PK,STERILE: Brand: MEDLINE

## (undated) DEVICE — MARKER,SKIN,WI/RULER AND LABELS: Brand: MEDLINE

## (undated) DEVICE — GLOVE SURG SZ 8 CRM LTX FREE POLYISOPRENE POLYMER BEAD ANTI

## (undated) DEVICE — SOLUTION IV 1000ML 0.9% SOD CHL PH 5 INJ USP VIAFLX PLAS

## (undated) DEVICE — CATHETER PTCA L135CM BLLN L60MM DIA7MM INTRO SHTH 6FR 7ATM

## (undated) DEVICE — GAUZE,SPONGE,4"X4",16PLY,XRAY,STRL,LF: Brand: MEDLINE

## (undated) DEVICE — SUTURE ETHILON SZ 4-0 L18IN NONABSORBABLE BLK L24MM FS-1 3/8 1629H

## (undated) DEVICE — DRESSING TRNSPAR W5XL4.5IN FLM SHT SEMIPERMEABLE WIND

## (undated) DEVICE — ANGIO-SEAL VIP VASCULAR CLOSURE DEVICE: Brand: ANGIO-SEAL

## (undated) DEVICE — CATHETER ANGIO 6FR L100CM DIA0.056IN FR4 CRV VASC ACCS EXPO

## (undated) DEVICE — 6FR JR4 CORDIS  DIAG CATHETER 100CM

## (undated) DEVICE — CATH BLLN ANGIO 2.25X12MM NC EUPHORIA RX

## (undated) DEVICE — GUIDEWIRE VASC L75CM DIA0.035IN TIP L7CM PTFE COAT S STL

## (undated) DEVICE — SUCTION CARDIAC FRAZIER TIP 6FR STERILE 3 14INL

## (undated) DEVICE — NAVICROSS SUPPORT CATHETER: Brand: NAVICROSS

## (undated) DEVICE — SUTURE PROL SZ 6-0 L24IN NONABSORBABLE BLU L9.3MM BV-1 3/8 8805H

## (undated) DEVICE — CATH BLLN ANGIO 2.50X12MM NC EUPHORIA RX

## (undated) DEVICE — INFLATION DEVICE: Brand: ENCORE 26

## (undated) DEVICE — SUTURE PROL SZ 5-0 L36IN NONABSORBABLE BLU L13MM C-1 3/8 8720H

## (undated) DEVICE — TOWEL,OR,DSP,ST,BLUE,DLX,XR,4/PK,20PK/CS: Brand: MEDLINE

## (undated) DEVICE — CATH BLLN ANGIO 3X8MM NC EUPHORIA RX

## (undated) DEVICE — SYRINGE ONLY,20ML LUER LOCK: Brand: MEDLINE INDUSTRIES, INC.

## (undated) DEVICE — RUNTHROUGH NS EXTRA FLOPPY PTCA GUIDEWIRE: Brand: RUNTHROUGH

## (undated) DEVICE — C-ARM: Brand: UNBRANDED

## (undated) DEVICE — PERCUTANEOUS ENTRY THINWALL NEEDLE  ONE-PART: Brand: COOK

## (undated) DEVICE — CATH BLLN ANGIO 3.50X15MM NC EUPHORA RX

## (undated) DEVICE — STRAP ARMBRD W1.5XL32IN FOAM STR YET SFT W/ HK AND LOOP

## (undated) DEVICE — CATH BLLN ANGIO 2X12MM NC EUPHORIA RX

## (undated) DEVICE — GLOVE SURG SZ 65 L12IN FNGR THK79MIL GRN LTX FREE

## (undated) DEVICE — 3M™ STERI-STRIP™ COMPOUND BENZOIN TINCTURE 40 BAGS/CARTON 4 CARTONS/CASE C1544: Brand: 3M™ STERI-STRIP™

## (undated) DEVICE — INTENDED FOR TISSUE SEPARATION, AND OTHER PROCEDURES THAT REQUIRE A SHARP SURGICAL BLADE TO PUNCTURE OR CUT.: Brand: BARD-PARKER ® CARBON RIB-BACK BLADES

## (undated) DEVICE — GLOVE SURG SZ 6 L12IN FNGR THK79MIL GRN LTX FREE

## (undated) DEVICE — CATHETER ANGIO IM 0.056 INX6 FRX100 CM EXPO

## (undated) DEVICE — LUER-LOK 360°: Brand: CONNECTA, LUER-LOK

## (undated) DEVICE — TUBING SUCT 12FR MAL ALUM SHFT FN CAP VENT UNIV CONN W/ OBT

## (undated) DEVICE — COVER LT HNDL BLU PLAS

## (undated) DEVICE — SUTURE N ABSRB L 18 IN SZ 4-0 NDL L 19 MM NYL MONOFILAMENT

## (undated) DEVICE — MERCY HEALTH TOLEDO ANGIO PACK: Brand: MEDLINE INDUSTRIES, INC.

## (undated) DEVICE — INTRODUCER SHTH 7FR CANN L11CM 0.038IN STD ORNG W/ MINI

## (undated) DEVICE — CATHETER GUID 6FR L100CM GRN PTFE JR4 TRUELUMEN HYBRID

## (undated) DEVICE — DRAPE,REIN 53X77,STERILE: Brand: MEDLINE

## (undated) DEVICE — BLADE,CARBON-STEEL,11,STRL,DISPOSABLE,TB: Brand: MEDLINE

## (undated) DEVICE — CATH BLLN ANGIO 2.75X15MM NC EUPHORIA RX

## (undated) DEVICE — SUTURE MCRYL SZ 5-0 L18IN ABSRB VLT P-3 L13MM 3/8 CIR REV Y463G

## (undated) DEVICE — CATHETER IV 18GA L1.16IN OD1.27-1.3462MM ID0.9398-1.016MM

## (undated) DEVICE — BLUNTFILL: Brand: MONOJECT

## (undated) DEVICE — SUTURE ETHLN SZ 4-0 L18IN NONABSORBABLE BLK L19MM PS-2 3/8 1667H

## (undated) DEVICE — CATHETER ANGIO 4FR L65CM 0.035IN VIS OMNI FLUSH-0 SFT TIP

## (undated) DEVICE — IV START KIT: Brand: MEDLINE